# Patient Record
Sex: MALE | Race: WHITE | NOT HISPANIC OR LATINO | Employment: OTHER | URBAN - METROPOLITAN AREA
[De-identification: names, ages, dates, MRNs, and addresses within clinical notes are randomized per-mention and may not be internally consistent; named-entity substitution may affect disease eponyms.]

---

## 2022-02-11 ENCOUNTER — TRANSFERRED RECORDS (OUTPATIENT)
Dept: HEALTH INFORMATION MANAGEMENT | Facility: CLINIC | Age: 62
End: 2022-02-11

## 2022-03-16 ENCOUNTER — TRANSFERRED RECORDS (OUTPATIENT)
Dept: HEALTH INFORMATION MANAGEMENT | Facility: CLINIC | Age: 62
End: 2022-03-16

## 2022-04-25 ENCOUNTER — TRANSFERRED RECORDS (OUTPATIENT)
Dept: HEALTH INFORMATION MANAGEMENT | Facility: CLINIC | Age: 62
End: 2022-04-25

## 2022-05-09 ENCOUNTER — TRANSFERRED RECORDS (OUTPATIENT)
Dept: HEALTH INFORMATION MANAGEMENT | Facility: CLINIC | Age: 62
End: 2022-05-09

## 2022-05-09 LAB
EJECTION FRACTION: NORMAL %
INR (EXTERNAL): 1.2

## 2022-05-10 ENCOUNTER — TRANSFERRED RECORDS (OUTPATIENT)
Dept: HEALTH INFORMATION MANAGEMENT | Facility: CLINIC | Age: 62
End: 2022-05-10

## 2022-05-11 LAB
CREATININE (EXTERNAL): 1 MG/DL (ref 0.7–1.3)
GFR ESTIMATED (EXTERNAL): >=60 ML/MIN (ref 60–130)
GLUCOSE (EXTERNAL): 94 MG/DL (ref 70–99)
POTASSIUM (EXTERNAL): 3.9 MMOL/L (ref 3.5–5.1)

## 2022-05-16 ENCOUNTER — APPOINTMENT (OUTPATIENT)
Dept: CT IMAGING | Facility: CLINIC | Age: 62
DRG: 246 | End: 2022-05-16
Attending: PHYSICIAN ASSISTANT
Payer: COMMERCIAL

## 2022-05-16 ENCOUNTER — APPOINTMENT (OUTPATIENT)
Dept: GENERAL RADIOLOGY | Facility: CLINIC | Age: 62
DRG: 246 | End: 2022-05-16
Attending: PHYSICIAN ASSISTANT
Payer: COMMERCIAL

## 2022-05-16 ENCOUNTER — APPOINTMENT (OUTPATIENT)
Dept: ULTRASOUND IMAGING | Facility: CLINIC | Age: 62
DRG: 246 | End: 2022-05-16
Attending: PHYSICIAN ASSISTANT
Payer: COMMERCIAL

## 2022-05-16 ENCOUNTER — HOSPITAL ENCOUNTER (INPATIENT)
Facility: CLINIC | Age: 62
LOS: 10 days | Discharge: HOME | DRG: 246 | End: 2022-05-26
Attending: INTERNAL MEDICINE | Admitting: INTERNAL MEDICINE
Payer: COMMERCIAL

## 2022-05-16 DIAGNOSIS — D68.61 ANTIPHOSPHOLIPID ANTIBODY SYNDROME (H): ICD-10-CM

## 2022-05-16 DIAGNOSIS — F32.9 REACTIVE DEPRESSION: ICD-10-CM

## 2022-05-16 DIAGNOSIS — Z12.11 SPECIAL SCREENING FOR MALIGNANT NEOPLASMS, COLON: ICD-10-CM

## 2022-05-16 DIAGNOSIS — I50.9 DECOMPENSATED HEART FAILURE (H): Primary | ICD-10-CM

## 2022-05-16 DIAGNOSIS — I50.23 ACUTE ON CHRONIC SYSTOLIC HEART FAILURE (H): ICD-10-CM

## 2022-05-16 LAB
ABO/RH(D): NORMAL
ALBUMIN SERPL-MCNC: 3.5 G/DL (ref 3.4–5)
ALBUMIN SERPL-MCNC: 3.5 G/DL (ref 3.4–5)
ALBUMIN UR-MCNC: 20 MG/DL
ALP SERPL-CCNC: 90 U/L (ref 40–150)
ALP SERPL-CCNC: 90 U/L (ref 40–150)
ALT SERPL W P-5'-P-CCNC: 56 U/L (ref 0–70)
ALT SERPL W P-5'-P-CCNC: 56 U/L (ref 0–70)
ANION GAP SERPL CALCULATED.3IONS-SCNC: 9 MMOL/L (ref 3–14)
ANTIBODY SCREEN: NEGATIVE
APPEARANCE UR: CLEAR
APTT PPP: 53 SECONDS (ref 22–38)
AST SERPL W P-5'-P-CCNC: 44 U/L (ref 0–45)
AST SERPL W P-5'-P-CCNC: 44 U/L (ref 0–45)
BASOPHILS # BLD AUTO: 0 10E3/UL (ref 0–0.2)
BASOPHILS NFR BLD AUTO: 0 %
BILIRUB DIRECT SERPL-MCNC: 0.2 MG/DL (ref 0–0.2)
BILIRUB SERPL-MCNC: 0.4 MG/DL (ref 0.2–1.3)
BILIRUB SERPL-MCNC: 0.4 MG/DL (ref 0.2–1.3)
BILIRUB UR QL STRIP: NEGATIVE
BUN SERPL-MCNC: 26 MG/DL (ref 7–30)
CALCIUM SERPL-MCNC: 9.6 MG/DL (ref 8.5–10.1)
CHLORIDE BLD-SCNC: 103 MMOL/L (ref 94–109)
CHOLEST SERPL-MCNC: 98 MG/DL
CO2 SERPL-SCNC: 23 MMOL/L (ref 20–32)
COLOR UR AUTO: ABNORMAL
CREAT SERPL-MCNC: 0.96 MG/DL (ref 0.66–1.25)
EOSINOPHIL # BLD AUTO: 0 10E3/UL (ref 0–0.7)
EOSINOPHIL NFR BLD AUTO: 0 %
ERYTHROCYTE [DISTWIDTH] IN BLOOD BY AUTOMATED COUNT: 18.2 % (ref 10–15)
GFR SERPL CREATININE-BSD FRML MDRD: 90 ML/MIN/1.73M2
GLUCOSE BLD-MCNC: 131 MG/DL (ref 70–99)
GLUCOSE UR STRIP-MCNC: NEGATIVE MG/DL
HCT VFR BLD AUTO: 28.4 % (ref 40–53)
HDLC SERPL-MCNC: 34 MG/DL
HGB BLD-MCNC: 8.8 G/DL (ref 13.3–17.7)
HGB UR QL STRIP: NEGATIVE
IMM GRANULOCYTES # BLD: 0.1 10E3/UL
IMM GRANULOCYTES NFR BLD: 1 %
INR PPP: 1.23 (ref 0.85–1.15)
KETONES UR STRIP-MCNC: NEGATIVE MG/DL
LDLC SERPL CALC-MCNC: 45 MG/DL
LEUKOCYTE ESTERASE UR QL STRIP: NEGATIVE
LYMPHOCYTES # BLD AUTO: 0.9 10E3/UL (ref 0.8–5.3)
LYMPHOCYTES NFR BLD AUTO: 7 %
MAGNESIUM SERPL-MCNC: 2.2 MG/DL (ref 1.6–2.3)
MCH RBC QN AUTO: 28.6 PG (ref 26.5–33)
MCHC RBC AUTO-ENTMCNC: 31 G/DL (ref 31.5–36.5)
MCV RBC AUTO: 92 FL (ref 78–100)
MONOCYTES # BLD AUTO: 1 10E3/UL (ref 0–1.3)
MONOCYTES NFR BLD AUTO: 8 %
MUCOUS THREADS #/AREA URNS LPF: PRESENT /LPF
NEUTROPHILS # BLD AUTO: 10.7 10E3/UL (ref 1.6–8.3)
NEUTROPHILS NFR BLD AUTO: 84 %
NITRATE UR QL: NEGATIVE
NONHDLC SERPL-MCNC: 64 MG/DL
NRBC # BLD AUTO: 0 10E3/UL
NRBC BLD AUTO-RTO: 0 /100
NT-PROBNP SERPL-MCNC: 5955 PG/ML (ref 0–900)
PH UR STRIP: 5.5 [PH] (ref 5–7)
PLATELET # BLD AUTO: 344 10E3/UL (ref 150–450)
POTASSIUM BLD-SCNC: 4.7 MMOL/L (ref 3.4–5.3)
PROT SERPL-MCNC: 7.4 G/DL (ref 6.8–8.8)
PROT SERPL-MCNC: 7.4 G/DL (ref 6.8–8.8)
RBC # BLD AUTO: 3.08 10E6/UL (ref 4.4–5.9)
RBC URINE: 0 /HPF
SARS-COV-2 RNA RESP QL NAA+PROBE: NEGATIVE
SODIUM SERPL-SCNC: 135 MMOL/L (ref 133–144)
SP GR UR STRIP: 1.03 (ref 1–1.03)
TRIGL SERPL-MCNC: 96 MG/DL
TSH SERPL DL<=0.005 MIU/L-ACNC: 1.02 MU/L (ref 0.4–4)
UROBILINOGEN UR STRIP-MCNC: NORMAL MG/DL
WBC # BLD AUTO: 12.6 10E3/UL (ref 4–11)
WBC URINE: <1 /HPF

## 2022-05-16 PROCEDURE — 93880 EXTRACRANIAL BILAT STUDY: CPT

## 2022-05-16 PROCEDURE — 93970 EXTREMITY STUDY: CPT | Mod: 26 | Performed by: RADIOLOGY

## 2022-05-16 PROCEDURE — 85025 COMPLETE CBC W/AUTO DIFF WBC: CPT

## 2022-05-16 PROCEDURE — 86901 BLOOD TYPING SEROLOGIC RH(D): CPT | Performed by: PHYSICIAN ASSISTANT

## 2022-05-16 PROCEDURE — 36415 COLL VENOUS BLD VENIPUNCTURE: CPT

## 2022-05-16 PROCEDURE — 82248 BILIRUBIN DIRECT: CPT

## 2022-05-16 PROCEDURE — 71046 X-RAY EXAM CHEST 2 VIEWS: CPT

## 2022-05-16 PROCEDURE — 85610 PROTHROMBIN TIME: CPT

## 2022-05-16 PROCEDURE — 83735 ASSAY OF MAGNESIUM: CPT

## 2022-05-16 PROCEDURE — 83718 ASSAY OF LIPOPROTEIN: CPT

## 2022-05-16 PROCEDURE — 250N000012 HC RX MED GY IP 250 OP 636 PS 637

## 2022-05-16 PROCEDURE — 93970 EXTREMITY STUDY: CPT

## 2022-05-16 PROCEDURE — 93880 EXTRACRANIAL BILAT STUDY: CPT | Mod: 26 | Performed by: RADIOLOGY

## 2022-05-16 PROCEDURE — 84450 TRANSFERASE (AST) (SGOT): CPT

## 2022-05-16 PROCEDURE — 93005 ELECTROCARDIOGRAM TRACING: CPT

## 2022-05-16 PROCEDURE — 250N000011 HC RX IP 250 OP 636

## 2022-05-16 PROCEDURE — 214N000001 HC R&B CCU UMMC

## 2022-05-16 PROCEDURE — 81001 URINALYSIS AUTO W/SCOPE: CPT | Performed by: PHYSICIAN ASSISTANT

## 2022-05-16 PROCEDURE — 80321 ALCOHOLS BIOMARKERS 1OR 2: CPT

## 2022-05-16 PROCEDURE — 93010 ELECTROCARDIOGRAM REPORT: CPT | Performed by: INTERNAL MEDICINE

## 2022-05-16 PROCEDURE — 71250 CT THORAX DX C-: CPT | Mod: 26 | Performed by: RADIOLOGY

## 2022-05-16 PROCEDURE — 85730 THROMBOPLASTIN TIME PARTIAL: CPT | Performed by: PHYSICIAN ASSISTANT

## 2022-05-16 PROCEDURE — 71046 X-RAY EXAM CHEST 2 VIEWS: CPT | Mod: 26 | Performed by: RADIOLOGY

## 2022-05-16 PROCEDURE — U0005 INFEC AGEN DETEC AMPLI PROBE: HCPCS

## 2022-05-16 PROCEDURE — 83880 ASSAY OF NATRIURETIC PEPTIDE: CPT

## 2022-05-16 PROCEDURE — 99223 1ST HOSP IP/OBS HIGH 75: CPT | Mod: AI | Performed by: INTERNAL MEDICINE

## 2022-05-16 PROCEDURE — 84443 ASSAY THYROID STIM HORMONE: CPT

## 2022-05-16 PROCEDURE — 84156 ASSAY OF PROTEIN URINE: CPT | Performed by: STUDENT IN AN ORGANIZED HEALTH CARE EDUCATION/TRAINING PROGRAM

## 2022-05-16 PROCEDURE — 36415 COLL VENOUS BLD VENIPUNCTURE: CPT | Performed by: PHYSICIAN ASSISTANT

## 2022-05-16 PROCEDURE — 99222 1ST HOSP IP/OBS MODERATE 55: CPT | Performed by: PHYSICIAN ASSISTANT

## 2022-05-16 PROCEDURE — 86850 RBC ANTIBODY SCREEN: CPT | Performed by: PHYSICIAN ASSISTANT

## 2022-05-16 PROCEDURE — 84134 ASSAY OF PREALBUMIN: CPT

## 2022-05-16 PROCEDURE — 80323 ALKALOIDS NOS: CPT

## 2022-05-16 PROCEDURE — 250N000013 HC RX MED GY IP 250 OP 250 PS 637

## 2022-05-16 PROCEDURE — 71250 CT THORAX DX C-: CPT

## 2022-05-16 RX ORDER — HYDROXYCHLOROQUINE SULFATE 200 MG/1
200 TABLET, FILM COATED ORAL 2 TIMES DAILY
COMMUNITY
End: 2022-09-15

## 2022-05-16 RX ORDER — MYCOPHENOLATE MOFETIL 500 MG/1
1500 TABLET ORAL 2 TIMES DAILY
Status: ON HOLD | COMMUNITY
End: 2022-09-01

## 2022-05-16 RX ORDER — ZOLPIDEM TARTRATE 5 MG/1
5 TABLET ORAL
COMMUNITY
End: 2022-10-07

## 2022-05-16 RX ORDER — LORAZEPAM 0.5 MG/1
0.5 TABLET ORAL 2 TIMES DAILY PRN
Status: DISCONTINUED | OUTPATIENT
Start: 2022-05-16 | End: 2022-05-16

## 2022-05-16 RX ORDER — MYCOPHENOLATE MOFETIL 250 MG/1
1500 CAPSULE ORAL 2 TIMES DAILY
Status: DISCONTINUED | OUTPATIENT
Start: 2022-05-16 | End: 2022-05-17

## 2022-05-16 RX ORDER — POTASSIUM CHLORIDE 1500 MG/1
20 TABLET, EXTENDED RELEASE ORAL DAILY
Status: ON HOLD | COMMUNITY
End: 2022-05-25

## 2022-05-16 RX ORDER — HYDROXYZINE HYDROCHLORIDE 50 MG/1
50 TABLET, FILM COATED ORAL EVERY 6 HOURS PRN
Status: DISCONTINUED | OUTPATIENT
Start: 2022-05-16 | End: 2022-05-26 | Stop reason: HOSPADM

## 2022-05-16 RX ORDER — TAMSULOSIN HYDROCHLORIDE 0.4 MG/1
0.4 CAPSULE ORAL DAILY
COMMUNITY
End: 2022-09-04

## 2022-05-16 RX ORDER — LANOLIN ALCOHOL/MO/W.PET/CERES
3-6 CREAM (GRAM) TOPICAL
Status: DISCONTINUED | OUTPATIENT
Start: 2022-05-16 | End: 2022-05-17

## 2022-05-16 RX ORDER — TAMSULOSIN HYDROCHLORIDE 0.4 MG/1
0.4 CAPSULE ORAL DAILY
Status: DISCONTINUED | OUTPATIENT
Start: 2022-05-17 | End: 2022-05-26 | Stop reason: HOSPADM

## 2022-05-16 RX ORDER — ASPIRIN 81 MG/1
81 TABLET, CHEWABLE ORAL DAILY
Status: ON HOLD | COMMUNITY
End: 2022-05-25

## 2022-05-16 RX ORDER — FUROSEMIDE 40 MG
40 TABLET ORAL
Status: DISCONTINUED | OUTPATIENT
Start: 2022-05-16 | End: 2022-05-17

## 2022-05-16 RX ORDER — FUROSEMIDE 40 MG
40 TABLET ORAL 2 TIMES DAILY
Status: ON HOLD | COMMUNITY
End: 2022-05-30

## 2022-05-16 RX ORDER — LANOLIN ALCOHOL/MO/W.PET/CERES
6 CREAM (GRAM) TOPICAL
Status: ON HOLD | COMMUNITY
End: 2022-09-14

## 2022-05-16 RX ORDER — ENOXAPARIN SODIUM 100 MG/ML
1 INJECTION SUBCUTANEOUS EVERY 12 HOURS
Status: DISCONTINUED | OUTPATIENT
Start: 2022-05-16 | End: 2022-05-26 | Stop reason: HOSPADM

## 2022-05-16 RX ORDER — ACETAMINOPHEN 325 MG/1
650 TABLET ORAL EVERY 4 HOURS PRN
Status: DISCONTINUED | OUTPATIENT
Start: 2022-05-16 | End: 2022-05-26 | Stop reason: HOSPADM

## 2022-05-16 RX ORDER — ENOXAPARIN SODIUM 100 MG/ML
70 INJECTION SUBCUTANEOUS EVERY 12 HOURS
Status: ON HOLD | COMMUNITY
End: 2022-05-26

## 2022-05-16 RX ORDER — FERROUS SULFATE 325(65) MG
325 TABLET ORAL
Status: DISCONTINUED | OUTPATIENT
Start: 2022-05-17 | End: 2022-05-26 | Stop reason: HOSPADM

## 2022-05-16 RX ORDER — ASPIRIN 81 MG/1
81 TABLET ORAL DAILY
Status: DISCONTINUED | OUTPATIENT
Start: 2022-05-17 | End: 2022-05-24

## 2022-05-16 RX ORDER — LORAZEPAM 0.5 MG/1
0.5 TABLET ORAL DAILY PRN
Status: DISCONTINUED | OUTPATIENT
Start: 2022-05-16 | End: 2022-05-17

## 2022-05-16 RX ORDER — FAMOTIDINE 20 MG/1
20 TABLET, FILM COATED ORAL 2 TIMES DAILY
Status: DISCONTINUED | OUTPATIENT
Start: 2022-05-16 | End: 2022-05-26 | Stop reason: HOSPADM

## 2022-05-16 RX ORDER — HYDROXYCHLOROQUINE SULFATE 200 MG/1
200 TABLET, FILM COATED ORAL 2 TIMES DAILY
Status: DISCONTINUED | OUTPATIENT
Start: 2022-05-16 | End: 2022-05-26 | Stop reason: HOSPADM

## 2022-05-16 RX ORDER — ATORVASTATIN CALCIUM 40 MG/1
40 TABLET, FILM COATED ORAL DAILY
Status: DISCONTINUED | OUTPATIENT
Start: 2022-05-17 | End: 2022-05-26 | Stop reason: HOSPADM

## 2022-05-16 RX ORDER — HYDROXYZINE HYDROCHLORIDE 25 MG/1
25 TABLET, FILM COATED ORAL EVERY 6 HOURS PRN
Status: DISCONTINUED | OUTPATIENT
Start: 2022-05-16 | End: 2022-05-26 | Stop reason: HOSPADM

## 2022-05-16 RX ORDER — LORAZEPAM 0.5 MG/1
.5-1 TABLET ORAL EVERY 4 HOURS PRN
Status: ON HOLD | COMMUNITY
End: 2022-09-01

## 2022-05-16 RX ORDER — FAMOTIDINE 20 MG/1
20 TABLET, FILM COATED ORAL 2 TIMES DAILY
Status: ON HOLD | COMMUNITY
End: 2022-08-21

## 2022-05-16 RX ORDER — ATORVASTATIN CALCIUM 40 MG/1
40 TABLET, FILM COATED ORAL DAILY
Status: ON HOLD | COMMUNITY
End: 2022-09-01

## 2022-05-16 RX ORDER — FERROUS SULFATE 325(65) MG
325 TABLET, DELAYED RELEASE (ENTERIC COATED) ORAL
COMMUNITY
Start: 2022-09-04 | End: 2022-09-04

## 2022-05-16 RX ADMIN — ACETAMINOPHEN 650 MG: 325 TABLET ORAL at 18:25

## 2022-05-16 RX ADMIN — Medication 3 MG: at 23:05

## 2022-05-16 RX ADMIN — FAMOTIDINE 20 MG: 20 TABLET ORAL at 20:00

## 2022-05-16 RX ADMIN — HYDROXYCHLOROQUINE SULFATE 200 MG: 200 TABLET, FILM COATED ORAL at 20:00

## 2022-05-16 RX ADMIN — MYCOPHENOLATE MOFETIL 1500 MG: 250 CAPSULE ORAL at 20:00

## 2022-05-16 RX ADMIN — Medication 12.5 MG: at 20:00

## 2022-05-16 RX ADMIN — ENOXAPARIN SODIUM 70 MG: 80 INJECTION SUBCUTANEOUS at 20:01

## 2022-05-16 RX ADMIN — HYDROXYZINE HYDROCHLORIDE 25 MG: 25 TABLET, FILM COATED ORAL at 18:24

## 2022-05-16 RX ADMIN — LORAZEPAM 0.5 MG: 0.5 TABLET ORAL at 23:05

## 2022-05-16 ASSESSMENT — ACTIVITIES OF DAILY LIVING (ADL)
WEAR_GLASSES_OR_BLIND: NO
DIFFICULTY_EATING/SWALLOWING: NO
ADLS_ACUITY_SCORE: 17
DRESS: 0-->INDEPENDENT
ADLS_ACUITY_SCORE: 31
DRESSING/BATHING_DIFFICULTY: NO
ADLS_ACUITY_SCORE: 17
ADLS_ACUITY_SCORE: 17
DOING_ERRANDS_INDEPENDENTLY_DIFFICULTY: NO
BATHING: 0-->INDEPENDENT
WALKING_OR_CLIMBING_STAIRS_DIFFICULTY: NO
TOILETING_ISSUES: NO
ADLS_ACUITY_SCORE: 17
DRESS: 0-->ASSISTANCE NEEDED (DEVELOPMENTALLY APPROPRIATE)
ADLS_ACUITY_SCORE: 17
CONCENTRATING,_REMEMBERING_OR_MAKING_DECISIONS_DIFFICULTY: NO
FALL_HISTORY_WITHIN_LAST_SIX_MONTHS: NO
CHANGE_IN_FUNCTIONAL_STATUS_SINCE_ONSET_OF_CURRENT_ILLNESS/INJURY: YES

## 2022-05-16 NOTE — Clinical Note
Single balloon inflation. The first balloon was inserted into the left anterior descending and proximal left anterior descending.Max pressure = 6 bijan. Total duration = 12 seconds.     Shockwave.

## 2022-05-16 NOTE — PROGRESS NOTES
"ADMIT    Patient arrived with EMS via stretcher about 1205. On room air, appears comfortable. Able to walk from stretcher to bed. No complaints of pain, current SOB, dizziness, or anything else.     Chief complaint is increased shortness of breath with prolonged activity.     /71 (BP Location: Right arm)   Pulse 99   Ht 1.715 m (5' 7.5\")   Wt 66.8 kg (147 lb 4.3 oz)   SpO2 98%   BMI 22.72 kg/m      Admission/Transfer from: [place]  2 RN skin assessment completed. Yes  Significant findings include: blanchable redness to coccyx  [facility] Nurse Consult Ordered? No      "

## 2022-05-16 NOTE — Clinical Note
The first balloon was inserted into the left anterior descending and ostium left anterior descending.Max pressure = 6 bijan. Total duration = 30 seconds.     Shockwave .

## 2022-05-16 NOTE — CONSULTS
CARDIOTHORACIC SURGERY CONSULT NOTE  5/16/2022      Reason for Consult: CABG/MVR      ASSESSMENT/PLAN: Patient is a 61 year old male with a history of CAD s/p remote PCI to LAD in 2017, ICM LVEF 30% s/p CRT-D, severe MR, APL with hx of DVT/PE on chronic anticoagulation with coumadin, hx of COVID-19, HTN, and HLD who presented to [facility] as a transfer from [facility] in [place] for evaluation of multivessel coronary artery disease and moderate-severe MR. LHC at outside hospital showed severe ostial LAD disease with in-stent restenosis of mid LAD at diag bifurcation, severe proximal RCA disease, mild circ disease. Echo on 5/9 showed LVEF 30-35% with moderate to severe functional MR. Patient has since been diuresed and appears euvolemic. CVTS was consulted for consideration of CABG/possible CABG.    STS Risk Calculator (CABG alone):   Risk of Mortality: 1.483%  Renal Failure: 1.560%  Permanent Stroke: 0.738%  Prolonged Ventilation: 11.972%  DSW Infection: 0.230%  Reoperation: 3.051%  Morbidity or Mortality: 16.484%  Short Length of Stay: 40.912%  Long Length of Stay: 4.922%    STS Risk Calculator (MVR/CABG):  Risk of Mortality: 5.392%  Renal Failure: 4.906%  Permanent Stroke: 1.739%  Prolonged Ventilation: 36.221%  DSW Infection: 0.409%  Reoperation: 7.609%  Morbidity or Mortality: 43.622%  Short Length of Stay: 9.572%  Long Length of Stay: 19.310%      - Recommend repeat echocardiogram to reassess LVEF and MR now that he is euvolemic.   - Will need pre-operative cardiac MRI to assess viability  - Will obtain vein mapping, bilateral carotid duplex US, CT chest w/o contrast  - Has a history of lupus, on immunosuppression with mycophenolate 1500 mg BID, please consult rheumatology to assess whether he can hold or reduce doses of immunosuppression.   - Consult dentistry for pre-op clearance prior to possible valve replacement  - Other pre-op labs ordered  - Plan for CABG, possible MVR this admission, pending  the above work-up  - Other cares per primary team  - Thank you for the opportunity to participate in the care of this patient.    Patient and plan discussed with attending, Dr. Darius Zamora.      Grant Gomez PA-C  Cardiothoracic Surgery  May 16, 2022 3:02 PM   p: [phone]       ________________________________________________________________________________________________    HPI: Patient is a 61 year old male with a history of  CAD s/p remote PCI to LAD in 2017, ICM LVEF 30% s/p CRT-D, severe MR, APL with hx of DVT/PE on chronic anticoagulation with coumadin, hx of COVID-19, HTN, and HLD who presented to [facility] as a transfer from [facility] in [place] for evaluation of multivessel coronary artery disease and moderate-severe MR.     Patient has a history of STEMI in 2017 and underwent LAD stenting at that time. He had ischemic cardiomyopathy and had a CRT-D placed for LBBB and persistently low LVEF.     He was then recently admitted for respiratory distress and COVID-19 infection versus decompensated heart failure with progression of his MR. His medical therapy was discontinued due to hypotension. Once recovered, he was discharged for mitraclip evaluation.     He was then readmitted to [facility] in [place] for SOB and heart failure exacerbation. He underwent LHC at outside hospital showed severe ostial LAD disease with in-stent restenosis of mid LAD at diag bifurcation, severe proximal RCA disease, mild circ disease. Echo on 5/9 showed LVEF 30-35% with moderate to severe functional MR. Patient has since been diuresed and transferred to [facility] for consideration of CABG/possible MVR with ECMO/VAD back-up.    Patient reports his SOB is much improved after his diuresis. He does still have some CASEY. He denies any chest pain, lightheadedness, dizziness, palpitations, LE edema.     PMH:  No past medical history on file.    PSH:  No past surgical history on file.    FH:  No family history  on file.    SH:  Social History     Socioeconomic History     Marital status: Single       Home Meds:  Medications Prior to Admission   Medication Sig Dispense Refill Last Dose     aspirin (ASA) 81 MG chewable tablet Take 81 mg by mouth daily        Allergies:  No Known Allergies  ROS:    ROS: 10 point ROS neg other than the symptoms noted above in the HPI.    Physical Exam:  Temp:  [98.7  F (37.1  C)] 98.7  F (37.1  C)  Pulse:  [99] 99  BP: (100)/(71) 100/71  SpO2:  [98 %] 98 %  Gen: NAD, resting comfortably in bed, conversational  HEENT: normocephalic, atraumatic cranium, EOMI, sclerae anicteric. Oral mucosa pink and moist, no tonsillar edema or erythema, midline trachea, nonpalpable thyroid  Lungs: CTA in all fields, no wheezing or rhonchi  CV: RRR, S1S2 normal, +Systolic murmur. Radial pulses and DP pulses symmetric. No dependent edema.   Abd: Positive normal pitched bowel sounds, overall soft and non distended, nontender, no hepatosplenomegaly, no masses/guarding/rebound tenderness.     Labs:  ABG No lab results found in last 7 days.  CBC  Recent Labs   Lab 05/16/22  1442   WBC 12.6*   HGB 8.8*        BMPNo lab results found in last 7 days.  LFTNo lab results found in last 7 days.  PancreasNo lab results found in last 7 days.    Imaging:  No results found for this or any previous visit (from the past 24 hour(s)).

## 2022-05-16 NOTE — Clinical Note
Single balloon inflation. The first balloon was inserted into the left anterior descending and proximal left anterior descending.Max pressure = 12 bijan. Total duration = 2 seconds.

## 2022-05-16 NOTE — H&P
[facility]    Cardiology History and Physical - Cardiology 2         Date of Admission:  5/16/2022    Assessment & Plan: HVSL    Dandy Sands is a 61 year old male with history of CAD s/p remote PCI to LAD, ICM LVEF 30% s/p CRT-D, severe MR, APL with hx of DVT/PE on chronic anticoagulation with warfarin, hx of COVID-19 1/2022 (was hospitalized, not intubated), HTN, and HLD who was admitted to [facility] 5/16/2022 as a transfer from [facility] in [place] for evaluation of multivessel coronary artery disease and moderate-severe functional mitral regurgitation.    #ICM (5/9/2022 LVEF 30-35%)  #CAD s/p PCI to LAD (2005)  #Severe ostial LAD disease with in-stent restenosis of mid LAD at diag bifurcation, severe proximal RCA disease, mild circ disease  #Hx of MI (2007  #S/p CRT-D (Medtronic 2006, gen change 2012)  #Moderate to severe functional MR  Transferred from [facility] in [place] 5/16/2022 for evaluation of CABG + MVR at a center with ECMO/VAD as a back up. CVTS involved   - CVTS consulted, appreciate recs   - TTE to reassess LVEF and MR now that he is euvolemic   - Cardiac MRI to assess viability   - Vein mapping   - Bilateral carotid duplex US   - CT chest w/o contrast   - Rheumatology consult to assess whether he can hold or reduce doses of immunosuppression   - Dental consult for pre-op clearance prior to possible valve replacement   - Plan for CABG, possible MVR this admission pending workup  - ACEI/ARB/ARNI- no  - BB- continue pta metoprolol succinate 12.5mg qhs  - Aldosterone receptor antagonist- no  - SGLT2i- no  - Antiplatelet- continue pta aspirin 81mg daily, holding pta Plavix 75 mg daily  - Statin- continue pta atorvastatin 40mg daily  - Diuretic- continue pta furosemide 40mg bid  - OT consult  - Nutrition consult  - Strict I&Os  - Daily weights  - Telemetry    #SLE  #APL  #Hx of DVT and PE  PTA on warfarin (he took 6.5mg daily).  Transitioned to Lovenox at OSH in anticipation of surgery. He reports that his main symptom of lupus is diffuse joint pains that is tolerable with Tylenol.   - Rheumatology consulted, as above   - Lovenox 70mg q12h  - continue pta hydroxychloroquine 200mg bid   - continue pta mycophenolic acid 1500mg q12h   - Tylenol prn for pain    #Anxiety  Reports feeling anxious.   - continue pta lorazepam 0.5mg prn  - hydroxyzine prn for anxiety  - melatonin prn qhs  _________________  Chronic/stable:    #Anemia, chronic  - continue pta ferrous sulfate 325mg daily     #GERD  - continue pta famotidine 20mg bid    #BPH  - continue pta tamsulosin 0.4mg daily    #Hx of COVID-19  Had COVID ~1/2022, was hospitalized for ~6 days, was on BiPap for a little, was not intubated.       Diet: 2g Na diet, 2L fluid restriction   DVT Prophylaxis: Enoxaparin (Lovenox) SQ  Fitzgerald Catheter: Not present  Code Status: full code  Fluids: n/a  Lines: PIV       Disposition Plan   Expected discharge: 4 - 7 days, recommended to prior living arrangement once evaluation for surgical candidacy for CABG/MVR.    Entered: Austin Can MD 05/16/2022, 6:19 PM     Will be formally staffed in the AM.    Austin Can MD  [facility]    ______________________________________________________________________    Chief Complaint   Multivessel CAD and mitral regurgitation    History is obtained from the patient    History of Present Illness   Dandy Sands is a 61 year old male with history of CAD s/p remote PCI to LAD, ICM LVEF 30% s/p CRT-D, severe MR, APL with hx of DVT/PE on chronic anticoagulation with warfarin, hx of COVID-19 1/2022 (was hospitalized, not intubated), HTN, and HLD who presented to [facility] as a transfer from [facility] in [place] for evaluation of multivessel coronary artery disease and moderate-severe functional mitral regurgitation.    Regarding his cardiac history, underwent LAD  stenting first in 2005, STEMI in 2007.  This was complicated by ischemic cardiomyopathy and eventually had a CRT-D (Medtronic 2006, gen change 2012) placed for a left bundle branch block and persistently low LVEF despite medical therapy. He has had various degrees of mitral regurgitation which has been managed medically for quite some time. Recently he was admitted after having COVID-19 for respiratory distress. It was unclear whether this initially was  related to pneumonia or decompensated heart failure. After a prolonged hospitalization and extensive testing it was felt that this was related to decompensated heart failure with progression of his MR. Due to persistent hypotension he was taken off most of his GDMT. He was seen HF who felt that the MR was the primary  of his decompensation.  He was eventually discharged with referral for mitraclip eval.      Per report he had evaluation on 5/9/22.  LHC showed severe ostial LAD disease with in-stent restenosis of the mid LAD involving a diagonal bifurcation, mild LCx disease, and severe proximal RCA disease. LVEDP was 25.  TAVARES with anesthesia showed LVEF 30-35%, severely dilated LA, moderate to severe functional MR with multiple jets and apically tethered leaflets (he was hypotensive during the procedure SBP 70s). Due to his age, low LVEF, and multivessel CAD he was admitted that day for surgical evaluation.  CTS at [place] felt he would benefit from CABG + MVR but that he should be at a center with ECMO/VAD back up.      Otherwise at the OSH, he was diursed about 3kg and transitioned to to oral lasix.  He was taken off warfarin and started on Lovenox as a bridge, and also Plavix was held in anticipation of surgery.  Hospital course was otherwise complicated by epistaxis requiring cautery by ENT, and a headache ([facility] 5/14 negative per report, neuro felt to be migraines).    Meds at OSH  Aspirin 81mg daily  lipitor 40  lovenox 80mg q12h  pepcid 20mg bid  Iron ec  "325mg daily   Lasix 40mg bid  Plaquenil 200mg bid  toprol 12.5mg at bedtime  cellcept 1500mg q12h  [id number] every day  flomax 0.4mg daily  Tylenol [id number]    Talking with him today, he says that he was hospitalized in Jan 2022 for ~6 days with COVID, was on BiPAP, was not intubated. He has been hospitalized multiple times since then. Mitral regurgitation and multivessel CAD found eventually as noted above. He reports no fevers, chills, headaches, chest pain, difficulty breathing, abdominal pain, nausea, vomiting, dysuria. Has 1 loose stool per day. Has joint pains diffusely (main symptom of his lupus). Tylenol helps with his joint pains. No history of liver disease or stroke. No melena or hematochezia. In January he weighed about 185 lbs. 147 lbs today.    Social hx:   - Tobacco- He is a former smoker 2 ppd for 40 years quit on 09/09/09  - EtOH- quit 5 years ago  - Illicit drug use- no  - Medication compliance- takes his meds \"98% of the time\"  - Job- works as a  in [place], [place]  - Living- lives alone with his cat (Rubens) in [place], [place]. Lives in a house.  - Functional status- he does not have problems walking up a staircase. Is able to take care of himself.  - Daughter Asha Sands ([phone])- surrogate decision maker, Living Will Durable POA for Healthcare  - Son Amos Sands ([phone])  - full code. reports that he would not want to live in a nursing home    Review of Systems    The 10 point Review of Systems is negative other than noted in the HPI or here.    Past Medical History    I have reviewed this patient's medical history and updated it with pertinent information if needed.   No past medical history on file.    Past Surgical History   I have reviewed this patient's surgical history and updated it with pertinent information if needed.  - Had surgery for a hernia years ago.    Social History   I have reviewed this patient's social history and updated it with pertinent " information if needed.     Family History   I have reviewed this patient's family history and updated it with pertinent information if needed.   - Family history- mom and dad both had lung cancer (both smokers)    Prior to Admission Medications   Prior to Admission Medications   Prescriptions Last Dose Informant Patient Reported? Taking?   LORazepam (ATIVAN) 0.5 MG tablet 5/15/2022 at 2300  Yes Yes   Sig: Take 0.5-1 mg by mouth every 4 hours as needed for anxiety   aspirin (ASA) 81 MG chewable tablet 5/16/2022 at 0700  Yes Yes   Sig: Take 81 mg by mouth daily   ferrous sulfate (FE TABS) 325 (65 Fe) MG EC tablet 5/15/2022 at 1400  Yes Yes   Sig: Take 325 mg by mouth daily (with lunch)   mycophenolate (GENERIC EQUIVALENT) 500 MG tablet 5/16/2022 at 0700  Yes Yes   Sig: Take 1,500 mg by mouth 2 times daily   zolpidem (AMBIEN) 5 MG tablet 5/15/2022 at mn  Yes Yes   Sig: Take 5 mg by mouth nightly as needed for sleep      Facility-Administered Medications: None     Allergies   No Known Allergies    Physical Exam   Vital Signs: Temp: (!) 96.2  F (35.7  C) Temp src: Axillary BP: 93/71 Pulse: 101   Resp: 14 SpO2: 98 %      Weight: 147 lbs 4.28 oz  General: sitting up in bed, NAD, appears comfortable  HEENT: no scleral icterus or conjunctival injection, oropharynx clear  Resp: clear to auscultation bilaterally, no crackles or wheezes  Cardiac: regular rate and rhythm, systolic murmur at apex. JVD to mid neck when lying at 30 degrees.  Abdomen: soft, non-tender, non-distended. No rebound or guarding.  Extremities: warm, no lower extremity edema  MSK: some reduced muscle bulk  Neuro: alert, oriented x4. No focal neuro deficits.  Psych: appropriate mood and affect    Data   Data reviewed today: I reviewed all medications, new labs and imaging results over the last 24 hours. I personally reviewed Cardiology specific data review: the EKG tracing showing sinus rhythm, left axis deviation

## 2022-05-16 NOTE — Clinical Note
The first balloon was inserted into the left anterior descending and proximal left anterior descending.Max pressure = 14 bijan. Total duration = 4 seconds.     Max pressure = 14 bijan. Total duration = 5 seconds.   3.5 NC .  Balloon reinflated a second time: Max pressure = 14 bijan. Total duration = 5 seconds.

## 2022-05-16 NOTE — Clinical Note
Stent deployed in the proximal right coronary artery. Max pressure = 12 bijan. Total duration = 3 seconds.

## 2022-05-16 NOTE — Clinical Note
Single balloon inflation. The first balloon was inserted into the right coronary artery and proximal right coronary artery.Max pressure = 12 bijan. Total duration = 5 seconds.     Max pressure = 14 bijan. Total duration = 5 seconds.    Balloon reinflated a second time: Max pressure = 14 bijan. Total duration = 5 seconds.

## 2022-05-16 NOTE — PROGRESS NOTES
Admit: 5/16/2022 12:05 PM Decompensated heart failure     Neuro: AAOx4. Denies any pain    Vitals: Temp: (!) 96.2  F (35.7  C) Temp src: Axillary BP: 93/71 Pulse: 101   Resp: 14 SpO2: 98 %    Sinus tachycardia    GI/: 2G NA and low fat diet with 2L fluid restriction. Last bm today per patient. Voiding per urinal.     Running: SL    Mobility: Independent in room.     Changes: CVTS Consulted. CT, EKG, chest xray, UA, echo done. covid test negative.    Plan: Continue workup for possible CABG and valve replacement.

## 2022-05-16 NOTE — Clinical Note
Single balloon inflation. The first balloon was inserted into the left anterior descending and proximal left anterior descending.Max pressure = 6 bijan. Total duration = 30 seconds.

## 2022-05-16 NOTE — Clinical Note
The first balloon was inserted into the left anterior descending and proximal left anterior descending.Max pressure = 10 bijan. Total duration = 4 seconds.     Max pressure = 10 bijan. Total duration = 3 seconds.    Balloon reinflated a second time: Max pressure = 10 bijan. Total duration = 3 seconds.  Balloon reinflated a third time: Max pressure = 12 bijan. Total duration = 2 seconds.

## 2022-05-16 NOTE — Clinical Note
The first balloon was inserted into the left anterior descending and proximal left anterior descending.Max pressure = 12 bijan. Total duration = 2 seconds.     Max pressure = 12 bijan. Total duration = 2 seconds.    Balloon reinflated a second time: Max pressure = 12 bijan. Total duration = 2 seconds.

## 2022-05-16 NOTE — PHARMACY-ADMISSION MEDICATION HISTORY
Admission Medication History Completed by Pharmacy    See [facility] Admission Navigator for allergy information, preferred outpatient pharmacy, prior to admission medications and immunization status.     Medication History Sources:     MAR from previous hospital ([facility], [place]; pt admitted there 5/9/22 and transferred to [facility] 5/16/22)    Changes made to PTA medication list (reason):    Added:   o Zolpidem  o Lorazepam  o Mycophenolate  o Ferrous sulfate  o Tamsulosin  o Potassium chloride  o Furosemide  o Atorvastatin  o Enoxaparin  o Melatonin  o Famotidine  o Hydroxychloroquine  o Metoprolol succinate    Deleted: None    Changed: None    Additional Information:    Pt not involved in this med rec interview. This med list reflects what pt was receiving during inpatient stay prior to transfer to [facility]; this does not reflect pt's med regimen prior to presenting to OSH on 5/9/22.    Transfer documentation indicates pt was previously on warfarin and clopidogrel that were both stopped (warfarin replaced by enoxaparin). Unfortunately the MAR from OSH did not have historical info dating back to when these meds were stopped/last given, as it only stated the last dose of each medication.    OSH records indicated NKDA.    Prior to Admission medications    Medication Sig Last Dose Taking? Auth Provider   aspirin (ASA) 81 MG chewable tablet Take 81 mg by mouth daily 5/16/2022 at 0700 Yes Reported, Patient   atorvastatin (LIPITOR) 40 MG tablet Take 40 mg by mouth daily 5/16/2022 at 0700 Yes Unknown, Entered By History   enoxaparin ANTICOAGULANT (LOVENOX) 80 MG/0.8ML syringe Inject 70 mg Subcutaneous every 12 hours 5/16/2022 at 0700 Yes Unknown, Entered By History   famotidine (PEPCID) 20 MG tablet Take 20 mg by mouth 2 times daily 5/16/2022 at 0700 Yes Unknown, Entered By History   ferrous sulfate (FE TABS) 325 (65 Fe) MG EC tablet Take 325 mg by mouth daily (with lunch) 5/15/2022 at 1400 Yes  Unknown, Entered By History   furosemide (LASIX) 40 MG tablet Take 40 mg by mouth 2 times daily 5/15/2022 at 1400 Yes Unknown, Entered By History   hydroxychloroquine (PLAQUENIL) 200 MG tablet Take 200 mg by mouth 2 times daily 5/16/2022 at 0700 Yes Unknown, Entered By History   LORazepam (ATIVAN) 0.5 MG tablet Take 0.5-1 mg by mouth every 4 hours as needed for anxiety 5/15/2022 at 2300 Yes Unknown, Entered By History   melatonin 3 MG tablet Take 6 mg by mouth nightly as needed for sleep 5/15/2022 at 2200 Yes Unknown, Entered By History   METOPROLOL SUCCINATE ER PO Take 12.5 mg by mouth At Bedtime 5/15/2022 at 2000 Yes Unknown, Entered By History   mycophenolate (GENERIC EQUIVALENT) 500 MG tablet Take 1,500 mg by mouth 2 times daily 5/16/2022 at 0700 Yes Unknown, Entered By History   potassium chloride ER (K-TAB) 20 MEQ CR tablet Take 20 mEq by mouth daily 5/16/2022 at 0700 Yes Unknown, Entered By History   tamsulosin (FLOMAX) 0.4 MG capsule Take 0.4 mg by mouth daily 5/16/2022 at 0700 Yes Unknown, Entered By History   zolpidem (AMBIEN) 5 MG tablet Take 5 mg by mouth nightly as needed for sleep 5/15/2022 at mn Yes Unknown, Entered By History        Date completed: 05/16/22    Medication history completed by:  Nigel Stern, PharmD, BCPS

## 2022-05-16 NOTE — Clinical Note
The first balloon was inserted into the right coronary artery and proximal right coronary artery.Max pressure = 6 bijan. Total duration = 30 seconds.     Shockwave .

## 2022-05-16 NOTE — Clinical Note
Single balloon inflation. The first balloon was inserted into the left anterior descending and proximal left anterior descending.Max pressure = 12 bijan. Total duration = 4 seconds.

## 2022-05-16 NOTE — PROGRESS NOTES
"SPIRITUAL HEALTH SERVICES  [facility] 6C  ON-CALL VISIT     REFERRAL SOURCE: With admission     Pt was cheerful, stated \"I'm fine, they haven't done anything yet. Today, I want a prayer before they do something. And tomorrow, I'll want a prayer after my surgery that I'll be ok.\" Pt stated his daughter would be coming to visit him tonight and then tomorrow his son and wife would be visiting    [facility] spiritual health services and provided prayer.     PLAN: Unit  follow-up.     Rev. Genevieve Zamora MDiv, [facility]  Staff    Pager [phone]  * [facility] remains available 24/7 for emergent requests/referrals, either by having the switchboard page the on-call  or by entering an ASAP/STAT consult in Epic (this will also page the on-call ).*      "

## 2022-05-16 NOTE — Clinical Note
Ultrasound catheter inserted for high resolution imaging into right coronary artery and proximal right coronary artery.

## 2022-05-16 NOTE — Clinical Note
The first balloon was inserted into the left anterior descending and ostium left anterior descending.Max pressure = 20 bijan. Total duration = 2 seconds.     Max pressure = 20 bijan. Total duration = 8 seconds.    Balloon reinflated a second time: Max pressure = 20 bijan. Total duration = 8 seconds.

## 2022-05-16 NOTE — Clinical Note
Single balloon inflation. The first balloon was inserted into the left anterior descending.Max pressure = 12 bijan. Total duration = 3 seconds.

## 2022-05-16 NOTE — Clinical Note
Ultrasound catheter inserted for high resolution imaging into left anterior descending and ostium left anterior descending.

## 2022-05-16 NOTE — Clinical Note
Stent deployed in the proximal left anterior descending. Max pressure = 12 bijan. Total duration = 5 seconds.

## 2022-05-16 NOTE — Clinical Note
The first balloon was inserted into the left anterior descending and proximal left anterior descending.Max pressure = 13 bijan. Total duration = 3 seconds.

## 2022-05-16 NOTE — Clinical Note
Single balloon inflation. The first balloon was inserted into the left anterior descending and ostium left anterior descending.Max pressure = 12 bijan. Total duration = 10 seconds.

## 2022-05-16 NOTE — Clinical Note
Stent deployed in the proximal left anterior descending. Max pressure = 12 bijan. Total duration = 2 seconds.

## 2022-05-17 ENCOUNTER — APPOINTMENT (OUTPATIENT)
Dept: CARDIOLOGY | Facility: CLINIC | Age: 62
DRG: 246 | End: 2022-05-17
Attending: INTERNAL MEDICINE
Payer: COMMERCIAL

## 2022-05-17 ENCOUNTER — APPOINTMENT (OUTPATIENT)
Dept: OCCUPATIONAL THERAPY | Facility: CLINIC | Age: 62
DRG: 246 | End: 2022-05-17
Attending: INTERNAL MEDICINE
Payer: COMMERCIAL

## 2022-05-17 ENCOUNTER — TELEPHONE (OUTPATIENT)
Dept: CARDIOLOGY | Facility: CLINIC | Age: 62
End: 2022-05-17

## 2022-05-17 ENCOUNTER — APPOINTMENT (OUTPATIENT)
Dept: CT IMAGING | Facility: CLINIC | Age: 62
DRG: 246 | End: 2022-05-17
Attending: INTERNAL MEDICINE
Payer: COMMERCIAL

## 2022-05-17 ENCOUNTER — APPOINTMENT (OUTPATIENT)
Dept: GENERAL RADIOLOGY | Facility: CLINIC | Age: 62
DRG: 246 | End: 2022-05-17
Attending: INTERNAL MEDICINE
Payer: COMMERCIAL

## 2022-05-17 LAB
ALBUMIN SERPL-MCNC: 3.2 G/DL (ref 3.4–5)
ALBUMIN SERPL-MCNC: 3.4 G/DL (ref 3.4–5)
ALP SERPL-CCNC: 85 U/L (ref 40–150)
ALP SERPL-CCNC: 88 U/L (ref 40–150)
ALT SERPL W P-5'-P-CCNC: 137 U/L (ref 0–70)
ALT SERPL W P-5'-P-CCNC: 145 U/L (ref 0–70)
ANION GAP SERPL CALCULATED.3IONS-SCNC: 11 MMOL/L (ref 3–14)
ANION GAP SERPL CALCULATED.3IONS-SCNC: 11 MMOL/L (ref 3–14)
AST SERPL W P-5'-P-CCNC: 104 U/L (ref 0–45)
AST SERPL W P-5'-P-CCNC: 80 U/L (ref 0–45)
ATRIAL RATE - MUSE: 100 BPM
BASOPHILS # BLD AUTO: 0 10E3/UL (ref 0–0.2)
BASOPHILS NFR BLD AUTO: 0 %
BILIRUB SERPL-MCNC: 0.4 MG/DL (ref 0.2–1.3)
BILIRUB SERPL-MCNC: 0.4 MG/DL (ref 0.2–1.3)
BUN SERPL-MCNC: 28 MG/DL (ref 7–30)
BUN SERPL-MCNC: 29 MG/DL (ref 7–30)
CALCIUM SERPL-MCNC: 8.8 MG/DL (ref 8.5–10.1)
CALCIUM SERPL-MCNC: 9.3 MG/DL (ref 8.5–10.1)
CHLORIDE BLD-SCNC: 104 MMOL/L (ref 94–109)
CHLORIDE BLD-SCNC: 106 MMOL/L (ref 94–109)
CO2 SERPL-SCNC: 21 MMOL/L (ref 20–32)
CO2 SERPL-SCNC: 24 MMOL/L (ref 20–32)
CREAT SERPL-MCNC: 0.97 MG/DL (ref 0.66–1.25)
CREAT SERPL-MCNC: 1.07 MG/DL (ref 0.66–1.25)
DIASTOLIC BLOOD PRESSURE - MUSE: NORMAL MMHG
EOSINOPHIL # BLD AUTO: 0 10E3/UL (ref 0–0.7)
EOSINOPHIL NFR BLD AUTO: 0 %
ERYTHROCYTE [DISTWIDTH] IN BLOOD BY AUTOMATED COUNT: 18.2 % (ref 10–15)
GFR SERPL CREATININE-BSD FRML MDRD: 79 ML/MIN/1.73M2
GFR SERPL CREATININE-BSD FRML MDRD: 89 ML/MIN/1.73M2
GLUCOSE BLD-MCNC: 114 MG/DL (ref 70–99)
GLUCOSE BLD-MCNC: 120 MG/DL (ref 70–99)
HCT VFR BLD AUTO: 27.9 % (ref 40–53)
HGB BLD-MCNC: 8.5 G/DL (ref 13.3–17.7)
IMM GRANULOCYTES # BLD: 0.1 10E3/UL
IMM GRANULOCYTES NFR BLD: 1 %
INR PPP: 1.3 (ref 0.85–1.15)
INTERPRETATION ECG - MUSE: NORMAL
LVEF ECHO: NORMAL
LYMPHOCYTES # BLD AUTO: 1.7 10E3/UL (ref 0.8–5.3)
LYMPHOCYTES NFR BLD AUTO: 15 %
MAGNESIUM SERPL-MCNC: 2.3 MG/DL (ref 1.6–2.3)
MCH RBC QN AUTO: 28.1 PG (ref 26.5–33)
MCHC RBC AUTO-ENTMCNC: 30.5 G/DL (ref 31.5–36.5)
MCV RBC AUTO: 92 FL (ref 78–100)
MONOCYTES # BLD AUTO: 0.8 10E3/UL (ref 0–1.3)
MONOCYTES NFR BLD AUTO: 8 %
NEUTROPHILS # BLD AUTO: 8.3 10E3/UL (ref 1.6–8.3)
NEUTROPHILS NFR BLD AUTO: 76 %
NRBC # BLD AUTO: 0 10E3/UL
NRBC BLD AUTO-RTO: 0 /100
P AXIS - MUSE: 69 DEGREES
PLATELET # BLD AUTO: 364 10E3/UL (ref 150–450)
POTASSIUM BLD-SCNC: 3.4 MMOL/L (ref 3.4–5.3)
POTASSIUM BLD-SCNC: 3.6 MMOL/L (ref 3.4–5.3)
POTASSIUM BLD-SCNC: 3.9 MMOL/L (ref 3.4–5.3)
PR INTERVAL - MUSE: 208 MS
PREALB SERPL IA-MCNC: 22 MG/DL (ref 15–45)
PROT SERPL-MCNC: 6.9 G/DL (ref 6.8–8.8)
PROT SERPL-MCNC: 7 G/DL (ref 6.8–8.8)
QRS DURATION - MUSE: 148 MS
QT - MUSE: 368 MS
QTC - MUSE: 474 MS
R AXIS - MUSE: -41 DEGREES
RBC # BLD AUTO: 3.02 10E6/UL (ref 4.4–5.9)
SODIUM SERPL-SCNC: 138 MMOL/L (ref 133–144)
SODIUM SERPL-SCNC: 139 MMOL/L (ref 133–144)
SYSTOLIC BLOOD PRESSURE - MUSE: NORMAL MMHG
T AXIS - MUSE: 95 DEGREES
VENTRICULAR RATE- MUSE: 100 BPM
WBC # BLD AUTO: 10.9 10E3/UL (ref 4–11)

## 2022-05-17 PROCEDURE — 999N000208 ECHOCARDIOGRAM COMPLETE

## 2022-05-17 PROCEDURE — 85610 PROTHROMBIN TIME: CPT

## 2022-05-17 PROCEDURE — 36415 COLL VENOUS BLD VENIPUNCTURE: CPT

## 2022-05-17 PROCEDURE — 70486 CT MAXILLOFACIAL W/O DYE: CPT | Mod: 26 | Performed by: RADIOLOGY

## 2022-05-17 PROCEDURE — 255N000002 HC RX 255 OP 636: Performed by: INTERNAL MEDICINE

## 2022-05-17 PROCEDURE — 83735 ASSAY OF MAGNESIUM: CPT

## 2022-05-17 PROCEDURE — 70486 CT MAXILLOFACIAL W/O DYE: CPT

## 2022-05-17 PROCEDURE — 250N000013 HC RX MED GY IP 250 OP 250 PS 637: Performed by: STUDENT IN AN ORGANIZED HEALTH CARE EDUCATION/TRAINING PROGRAM

## 2022-05-17 PROCEDURE — 85025 COMPLETE CBC W/AUTO DIFF WBC: CPT

## 2022-05-17 PROCEDURE — 82040 ASSAY OF SERUM ALBUMIN: CPT | Performed by: STUDENT IN AN ORGANIZED HEALTH CARE EDUCATION/TRAINING PROGRAM

## 2022-05-17 PROCEDURE — 250N000013 HC RX MED GY IP 250 OP 250 PS 637

## 2022-05-17 PROCEDURE — 36415 COLL VENOUS BLD VENIPUNCTURE: CPT | Performed by: STUDENT IN AN ORGANIZED HEALTH CARE EDUCATION/TRAINING PROGRAM

## 2022-05-17 PROCEDURE — 250N000013 HC RX MED GY IP 250 OP 250 PS 637: Performed by: INTERNAL MEDICINE

## 2022-05-17 PROCEDURE — 93306 TTE W/DOPPLER COMPLETE: CPT | Mod: 26 | Performed by: INTERNAL MEDICINE

## 2022-05-17 PROCEDURE — 214N000001 HC R&B CCU UMMC

## 2022-05-17 PROCEDURE — 97530 THERAPEUTIC ACTIVITIES: CPT | Mod: GO

## 2022-05-17 PROCEDURE — 97535 SELF CARE MNGMENT TRAINING: CPT | Mod: GO

## 2022-05-17 PROCEDURE — 71045 X-RAY EXAM CHEST 1 VIEW: CPT

## 2022-05-17 PROCEDURE — 250N000011 HC RX IP 250 OP 636: Performed by: STUDENT IN AN ORGANIZED HEALTH CARE EDUCATION/TRAINING PROGRAM

## 2022-05-17 PROCEDURE — 99223 1ST HOSP IP/OBS HIGH 75: CPT | Mod: GC | Performed by: INTERNAL MEDICINE

## 2022-05-17 PROCEDURE — 250N000012 HC RX MED GY IP 250 OP 636 PS 637

## 2022-05-17 PROCEDURE — 84132 ASSAY OF SERUM POTASSIUM: CPT | Performed by: STUDENT IN AN ORGANIZED HEALTH CARE EDUCATION/TRAINING PROGRAM

## 2022-05-17 PROCEDURE — 71045 X-RAY EXAM CHEST 1 VIEW: CPT | Mod: 26 | Performed by: RADIOLOGY

## 2022-05-17 PROCEDURE — 99233 SBSQ HOSP IP/OBS HIGH 50: CPT | Mod: 25 | Performed by: INTERNAL MEDICINE

## 2022-05-17 PROCEDURE — 250N000011 HC RX IP 250 OP 636

## 2022-05-17 PROCEDURE — 97166 OT EVAL MOD COMPLEX 45 MIN: CPT | Mod: GO

## 2022-05-17 PROCEDURE — 80053 COMPREHEN METABOLIC PANEL: CPT

## 2022-05-17 RX ORDER — FUROSEMIDE 10 MG/ML
40 INJECTION INTRAMUSCULAR; INTRAVENOUS
Status: DISCONTINUED | OUTPATIENT
Start: 2022-05-18 | End: 2022-05-18

## 2022-05-17 RX ORDER — POTASSIUM CHLORIDE 750 MG/1
20 TABLET, EXTENDED RELEASE ORAL ONCE
Status: COMPLETED | OUTPATIENT
Start: 2022-05-17 | End: 2022-05-17

## 2022-05-17 RX ORDER — POTASSIUM CHLORIDE 750 MG/1
40 TABLET, EXTENDED RELEASE ORAL ONCE
Status: COMPLETED | OUTPATIENT
Start: 2022-05-17 | End: 2022-05-17

## 2022-05-17 RX ORDER — FUROSEMIDE 10 MG/ML
80 INJECTION INTRAMUSCULAR; INTRAVENOUS ONCE
Status: COMPLETED | OUTPATIENT
Start: 2022-05-17 | End: 2022-05-17

## 2022-05-17 RX ORDER — MULTIPLE VITAMINS W/ MINERALS TAB 9MG-400MCG
1 TAB ORAL
Status: DISCONTINUED | OUTPATIENT
Start: 2022-05-17 | End: 2022-05-26 | Stop reason: HOSPADM

## 2022-05-17 RX ORDER — LORAZEPAM 0.5 MG/1
.5-1 TABLET ORAL EVERY 6 HOURS PRN
Status: DISCONTINUED | OUTPATIENT
Start: 2022-05-17 | End: 2022-05-26 | Stop reason: HOSPADM

## 2022-05-17 RX ORDER — LORAZEPAM 1 MG/1
2 TABLET ORAL
Status: COMPLETED | OUTPATIENT
Start: 2022-05-17 | End: 2022-05-18

## 2022-05-17 RX ORDER — MAGNESIUM HYDROXIDE/ALUMINUM HYDROXICE/SIMETHICONE 120; 1200; 1200 MG/30ML; MG/30ML; MG/30ML
30 SUSPENSION ORAL EVERY 4 HOURS PRN
Status: DISCONTINUED | OUTPATIENT
Start: 2022-05-17 | End: 2022-05-26 | Stop reason: HOSPADM

## 2022-05-17 RX ORDER — ZOLPIDEM TARTRATE 5 MG/1
5 TABLET ORAL
Status: DISCONTINUED | OUTPATIENT
Start: 2022-05-17 | End: 2022-05-26 | Stop reason: HOSPADM

## 2022-05-17 RX ORDER — POTASSIUM CHLORIDE 750 MG/1
40 TABLET, EXTENDED RELEASE ORAL ONCE
Status: DISCONTINUED | OUTPATIENT
Start: 2022-05-17 | End: 2022-05-17

## 2022-05-17 RX ADMIN — HYDROXYCHLOROQUINE SULFATE 200 MG: 200 TABLET, FILM COATED ORAL at 07:52

## 2022-05-17 RX ADMIN — Medication 1 TABLET: at 14:36

## 2022-05-17 RX ADMIN — FAMOTIDINE 20 MG: 20 TABLET ORAL at 07:51

## 2022-05-17 RX ADMIN — FUROSEMIDE 80 MG: 10 INJECTION, SOLUTION INTRAVENOUS at 11:54

## 2022-05-17 RX ADMIN — FUROSEMIDE 80 MG: 10 INJECTION, SOLUTION INTRAVENOUS at 20:09

## 2022-05-17 RX ADMIN — ATORVASTATIN CALCIUM 40 MG: 40 TABLET, FILM COATED ORAL at 07:51

## 2022-05-17 RX ADMIN — FUROSEMIDE 40 MG: 40 TABLET ORAL at 07:52

## 2022-05-17 RX ADMIN — HYDROXYZINE HYDROCHLORIDE 50 MG: 50 TABLET ORAL at 02:46

## 2022-05-17 RX ADMIN — FERROUS SULFATE TAB 325 MG (65 MG ELEMENTAL FE) 325 MG: 325 (65 FE) TAB at 11:59

## 2022-05-17 RX ADMIN — HYDROXYZINE HYDROCHLORIDE 50 MG: 50 TABLET ORAL at 20:06

## 2022-05-17 RX ADMIN — HYDROXYCHLOROQUINE SULFATE 200 MG: 200 TABLET, FILM COATED ORAL at 20:06

## 2022-05-17 RX ADMIN — Medication 3 MG: at 01:22

## 2022-05-17 RX ADMIN — HYDROXYZINE HYDROCHLORIDE 50 MG: 50 TABLET ORAL at 07:47

## 2022-05-17 RX ADMIN — LORAZEPAM 0.5 MG: 0.5 TABLET ORAL at 23:07

## 2022-05-17 RX ADMIN — HUMAN ALBUMIN MICROSPHERES AND PERFLUTREN 9 ML: 10; .22 INJECTION, SOLUTION INTRAVENOUS at 11:02

## 2022-05-17 RX ADMIN — POTASSIUM CHLORIDE 40 MEQ: 750 TABLET, EXTENDED RELEASE ORAL at 20:05

## 2022-05-17 RX ADMIN — ENOXAPARIN SODIUM 70 MG: 80 INJECTION SUBCUTANEOUS at 20:16

## 2022-05-17 RX ADMIN — LORAZEPAM 0.5 MG: 0.5 TABLET ORAL at 20:06

## 2022-05-17 RX ADMIN — ASPIRIN 81 MG: 81 TABLET, COATED ORAL at 07:51

## 2022-05-17 RX ADMIN — Medication 12.5 MG: at 20:06

## 2022-05-17 RX ADMIN — MYCOPHENOLATE MOFETIL 1500 MG: 250 CAPSULE ORAL at 07:51

## 2022-05-17 RX ADMIN — ENOXAPARIN SODIUM 70 MG: 80 INJECTION SUBCUTANEOUS at 07:55

## 2022-05-17 RX ADMIN — FAMOTIDINE 20 MG: 20 TABLET ORAL at 20:06

## 2022-05-17 RX ADMIN — TAMSULOSIN HYDROCHLORIDE 0.4 MG: 0.4 CAPSULE ORAL at 07:52

## 2022-05-17 RX ADMIN — ACETAMINOPHEN 650 MG: 325 TABLET ORAL at 23:54

## 2022-05-17 RX ADMIN — LORAZEPAM 0.5 MG: 0.5 TABLET ORAL at 08:52

## 2022-05-17 RX ADMIN — Medication 5 MG: at 21:08

## 2022-05-17 RX ADMIN — ALUMINUM HYDROXIDE, MAGNESIUM HYDROXIDE, AND SIMETHICONE 30 ML: 200; 200; 20 SUSPENSION ORAL at 02:40

## 2022-05-17 RX ADMIN — LORAZEPAM 0.5 MG: 0.5 TABLET ORAL at 14:39

## 2022-05-17 RX ADMIN — POTASSIUM CHLORIDE 20 MEQ: 750 TABLET, EXTENDED RELEASE ORAL at 13:14

## 2022-05-17 RX ADMIN — HYDROXYZINE HYDROCHLORIDE 50 MG: 50 TABLET ORAL at 14:58

## 2022-05-17 RX ADMIN — ZOLPIDEM TARTRATE 5 MG: 5 TABLET, FILM COATED ORAL at 23:54

## 2022-05-17 ASSESSMENT — ACTIVITIES OF DAILY LIVING (ADL)
ADLS_ACUITY_SCORE: 19
ADLS_ACUITY_SCORE: 17
ADLS_ACUITY_SCORE: 19
ADLS_ACUITY_SCORE: 17
ADLS_ACUITY_SCORE: 19
ADLS_ACUITY_SCORE: 19
ADLS_ACUITY_SCORE: 17
PREVIOUS_RESPONSIBILITIES: MEAL PREP;HOUSEKEEPING;LAUNDRY;SHOPPING;YARDWORK;MEDICATION MANAGEMENT;FINANCES;DRIVING

## 2022-05-17 NOTE — PROGRESS NOTES
05/17/22 1413   Quick Adds   Type of Visit Initial Occupational Therapy Evaluation   Living Environment   People in Home alone   Current Living Arrangements house   Home Accessibility stairs to enter home   Number of Stairs, Main Entrance 3   Stair Railings, Main Entrance none   Transportation Anticipated car, drives self;family or friend will provide   Living Environment Comments Pt lives alone with a cat in single level home, 3 THANG - no hand rails. Pt has a tub shower, no grab bars/shower chair.   Self-Care   Usual Activity Tolerance fair   Current Activity Tolerance fair   Regular Exercise No   Equipment Currently Used at Home none   Fall history within last six months no   Activity/Exercise/Self-Care Comment Pt reports being IND w/ ADL completion PTA, no AD use   Instrumental Activities of Daily Living (IADL)   Previous Responsibilities meal prep;housekeeping;laundry;shopping;yardwork;medication management;finances;driving   IADL Comments Pt reports being IND w/ IADL completion at baseline, states family/friends live within 20 miles and are able to assist if needed   General Information   Onset of Illness/Injury or Date of Surgery 05/16/22   Referring Physician Keri Escobar MD   Patient/Family Therapy Goal Statement (OT) Return home when able and increase activity tolerance   Additional Occupational Profile Info/Pertinent History of Current Problem Dandy Sands is a 61 year old male with history of CAD s/p remote PCI to LAD, ICM LVEF 30% s/p CRT-D, severe MR, 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   Existing Precautions/Restrictions cardiac   General Observations and Info Activity: Up ad dianna   Cognitive Status  Examination   Orientation Status orientation to person, place and time   Cognitive Status Comments No acute deficits identified, appears WFL overall. Pt reports some anxiety related to current health concerns. Will continue to monitor   Visual Perception   Visual Impairment/Limitations corrective lenses for reading   Impact of Vision Impairment on Function (Vision) No acute deficits   Sensory   Sensory Quick Adds No deficits were identified   Pain Assessment   Patient Currently in Pain No   Integumentary/Edema   Integumentary/Edema Comments Mild generalized edema per pt report, will continue to monitor, currently improving w/ diuretics   Posture   Posture forward head position   Range of Motion Comprehensive   General Range of Motion bilateral upper extremity ROM WFL   Strength Comprehensive (MMT)   General Manual Muscle Testing (MMT) Assessment no strength deficits identified   Muscle Tone Assessment   Muscle Tone Quick Adds No deficits were identified   Coordination   Upper Extremity Coordination No deficits were identified   Transfers   Transfer Comments IND   Activities of Daily Living   BADL Assessment/Intervention   (IND w/ ADL completion currently, will continue to monitor)   Clinical Impression   Criteria for Skilled Therapeutic Interventions Met (OT) Yes, treatment indicated   OT Diagnosis Decreased activity tolerance impacting IND w/ ADLs/IADls   OT Problem List-Impairments impacting ADL problems related to;activity tolerance impaired;strength   Assessment of Occupational Performance 1-3 Performance Deficits   Identified Performance Deficits Functional mobility/transfers, home mgmt, heavy IADLs  (Will re-assess following surgery if performed during this admission)   Planned Therapy Interventions (OT) IADL retraining;strengthening;home program guidelines;progressive activity/exercise;risk factor education   Clinical Decision Making Complexity (OT) moderate complexity   Anticipated Equipment Needs Upon  Discharge (OT) shower chair;other (see comments)  (TBD)   Risk & Benefits of therapy have been explained evaluation/treatment results reviewed;care plan/treatment goals reviewed;risks/benefits reviewed;current/potential barriers reviewed;participants voiced agreement with care plan;participants included;patient   Clinical Impression Comments Pt will benefit from skilled OT services to progress ADL/IADL I and support safe discharge plan   OT Discharge Planning   OT Discharge Recommendation (DC Rec) home with assist;home with outpatient cardiac rehab   OT Rationale for DC Rec Pt currently below baseline, limited by deconditioning and low activity tolerance. Currently, pt would be safe to d/c home however pt undergoing work up for CABG/MVR, will re-assess status following surgery and update recs as indicated. Recommend OP CR   OT Brief overview of current status IND, please encourage ambulation   Total Evaluation Time (Minutes)   Total Evaluation Time (Minutes) 5   OT Goals   Therapy Frequency (OT) 3 times/wk   OT Predicted Duration/Target Date for Goal Attainment 05/31/22   OT Goals Cardiac Phase 1;Home Management   OT: Home Management Modified independent;with moderate demand household tasks   OT: Understanding of cardiac education to maximize quality of life, condition management, and health outcomes Patient   OT: Perform aerobic activity with stable cardiovascular response continuous;10 minutes;ambulation;NuStep   OT: Functional/aerobic ambulation tolerance with stable cardiovascular response in order to return to home and community environment Modified independent;Greater than 300 feet   OT: Navigation of stairs simulating home set up with stable cardiovascular response in order to return to home and community environment Modified independent;3 stairs

## 2022-05-17 NOTE — PLAN OF CARE
Neuro: A/Ox4. Anxious. Had Melatonin, Ativan and Hydroxyzine for sleep and still woke up frequently.   Cardiac: ST with . VSS. BP soft 90/70   Respiratory: O2 sats stable on RA 95%  GI/:  Voiding in urinal, had large incontinent BM after c/o stomach ache overnight. Stated he had one emesis and felt better after. Stated he thinks he felt bad from the pizza he ate yesterday.   Diet/appetite: 2 gm NA, 2000 FR  Activity:  UAL  Pain: Denies pain  Skin: Blanchable redness on coccyx  LDA's: PIV sl'd    Plan: Continue with POC. Notify primary team with changes.

## 2022-05-17 NOTE — CONSULTS
[facility] Inpatient Consult Rheumatology Note    Impression/Plan:  61 year old male with history of CAD s/p remote PCI to LAD, ICM LVEF 30% s/p CRT-D, severe MR, SLE and APL with hx of DVT/PE on chronic anticoagulation with warfarin, hx of COVID-19 1/2022 (was hospitalized, not intubated), HTN, and HLD who was admitted to [facility] 5/16/2022 as a transfer from [facility] in [place] for evaluation of multivessel coronary artery disease and moderate-severe functional mitral regurgitation. Rheumatology consulted for evaluation of his current immunosuppressive regimen in setting of anticipated CABG.     #. Previously diagnosed APL w/ prior PE and DVT on chronic AC   #. Previously diagnosed SLE (LASHANDA+, dsDNA+, anti-Smith+, anti-RNP ab+, anti-histone ab+)  Overall lupus symptoms are well controlled on current regimen of plaquenil and MMF. In general for patients with compensated lupus undergoing elective surgery we recommend holding MMF one week prior to surgery and re-starting 3-5 days after surgery in the absence of surgical site infection, poor wound healing or infection elsewhere. This is based on consensus recommendations for patients undergoing elective orthopedic VEL or TKA and the level of evidence is considered low ( et al in the Journal of Arthritis and Rheumatology, 2017). For this patient undergoing evaluation for CABG, we recommend stopping MMF now in the event his surgery is imminent. Postponement of a necessary surgery just for holding his MMF is not indicated in his case. However, if his surgery is going to be delayed for other causes, then ideally his MMF would be held for 1 week prior.   - Ok to continue hydroxychloroquine 200mg BID throughout franklin-operative period   - Recommend holding mycophenolate mofetil for now prior to surgery if surgery is imminent; can re-start 7 days after surgery if wound healing progressing as expected and no concern for infection. If  "surgery will not be for some time, then would recommend stopping 1 week prior to planned surgery.     - Deirdre-operative anticoagulation plan per primary team; patient on chronic AC in setting of APL.Recommend transition back to warfarin when able.     Thank you for the rheumatology consultation. Please do not hesitate to contact the team  for any additional questions or concerns. We will sign off at this time.      Patient seen and evaluated with attending physician, Dr. Sterling.     Stacy Muniz MD/MPH  Internal Medicine/Dermatology  PGY-5    I saw this patient with the medical resident. I agree with the stated findings and recommendations which reflect our joint impressions and plan. Systemic lupus with musculoskeletal, cutaneous, and remote nephritis features is well-controlled on mycophenolate and hydroxychloroquine. I recommend holding MMF for up to 7 days prior to planned open heart surgery, and for 7 days afterwards.    James Sterling M.D.  Staff Rheumatologist, [facility]  Pager [phone]        Date of Admission: May 10, 2022   Encounter Date: 05/17/2022    Reason for Consultation:   \"Has a history of lupus, on immunosuppression with mycophenolate 1500 mg BID, consulting to assess whether he can hold or reduce doses of immunosuppression as we consider CABG/MVR\"    History of Present Illness:  61 year old male with history of CAD s/p remote PCI to LAD, ICM LVEF 30% s/p CRT-D, severe MR, SLE and APL with hx of DVT/PE on chronic anticoagulation with warfarin, hx of COVID-19 1/2022 (was hospitalized, not intubated), HTN, and HLD who was admitted to [facility] 5/16/2022 as a transfer from [facility] in [place] for evaluation of multivessel coronary artery disease and moderate-severe functional mitral regurgitation. Rheumatology consulted for evaluation of his current immunosuppressive regimen in setting of anticipated CABG.     Rheumatology history:   Patient reports being diagnosed with lupus about " 15-20 years ago when he presented with numerous blood clots, rash and swelling all over his body. He reports a history of extreme sun-sensitivity as well. He gets occasional episodes of posterior scleritis. His main symptom currently is joint pain but it is relatively well controlled. He has no history of nephritis.     He has been on plaquenil for many years. He was previously on azathioprine until summer of 2020 when he had some increased arthritis, anemia and mild proteinuria. He was switched to cellcept at that time. He has also been on chronic anticoagulation with warfarin.     Primary rheumatologist is Dr. Stanley Garcia in [place], [place]. Dr. Garcia was contacted for collateral information on his rheumatology history this admission.     Lupus labs 7/2011 available in [facility]:   - dsDNA 1:160  - LASHANDA 1:320 nucleolar   - C3/C3 wnl     Otherwise other serologies that have been positive included: anti-Smith ab, anti-RNP ab, anti-histone ab. SSA/SSB have been normal in the past.     Past Medical History:   Patient Active Problem List   Diagnosis     Decompensated heart failure (H)     No past medical history on file.  No past surgical history on file.    Social History:  Former smoker.     Family History:  No family history of SLE or other autoimmune conditions.     Medications:  Current Facility-Administered Medications   Medication     acetaminophen (TYLENOL) tablet 650 mg     alum & mag hydroxide-simethicone (MAALOX) suspension 30 mL     aspirin EC tablet 81 mg     atorvastatin (LIPITOR) tablet 40 mg     enoxaparin ANTICOAGULANT (LOVENOX) injection 70 mg     famotidine (PEPCID) tablet 20 mg     ferrous sulfate (FEROSUL) tablet 325 mg     furosemide (LASIX) tablet 40 mg     HOLD nitroGLYcerin IF     hydroxychloroquine (PLAQUENIL) tablet 200 mg     hydrOXYzine (ATARAX) tablet 25 mg    Or     hydrOXYzine (ATARAX) tablet 50 mg     LORazepam (ATIVAN) tablet 0.5 mg     melatonin tablet 3-6 mg      "metoprolol succinate ER (TOPROL-XL) 24 hr half-tab 12.5 mg     mycophenolate (GENERIC EQUIVALENT) capsule 1,500 mg     Patient is already receiving anticoagulation with heparin, enoxaparin (LOVENOX), warfarin (COUMADIN)  or other anticoagulant medication     Reason ACE/ARB/ARNI order not selected     Reason beta blocker not prescribed     tamsulosin (FLOMAX) capsule 0.4 mg     No Known Allergies    Physical exam:  Vitals: BP 96/72   Pulse 92   Temp 97.9  F (36.6  C) (Axillary)   Resp 16   Ht 1.715 m (5' 7.5\")   Wt 71.5 kg (157 lb 11.2 oz)   SpO2 99%   BMI 24.33 kg/m    GEN: Resting comfortably in bed, NAD.    HEENT: NC/AT, EOMI, MMM. No oral ulcers.   Cardiac: RRR, systolic murmur at apex.    Resp: Breathing comfortably on RA, no use of accessory muscles. No crackles or wheezes.   Abd: Soft, non-tender and non-distended.   MSK: No synovitis of the bilateral hands. No tenderness to palpation of hand joints.   Skin: Warm and well perfused, no rashes. No distinctive nailfold capillary changes.   Neuro: A&Ox3, CNII-XII grossly intact.     Laboratory:  Results for orders placed or performed during the hospital encounter of 05/16/22 (from the past 24 hour(s))   EKG 12 lead   Result Value Ref Range    Systolic Blood Pressure  mmHg    Diastolic Blood Pressure  mmHg    Ventricular Rate 100 BPM    Atrial Rate 100 BPM    ME Interval 208 ms    QRS Duration 148 ms     ms    QTc 474 ms    P Axis 69 degrees    R AXIS -41 degrees    T Axis 95 degrees    Interpretation ECG       Sinus rhythm  Left axis deviation  Non-specific intra-ventricular conduction block  Abnormal ECG  No previous ECGs available     Comprehensive metabolic panel   Result Value Ref Range    Sodium 135 133 - 144 mmol/L    Potassium 4.7 3.4 - 5.3 mmol/L    Chloride 103 94 - 109 mmol/L    Carbon Dioxide (CO2) 23 20 - 32 mmol/L    Anion Gap 9 3 - 14 mmol/L    Urea Nitrogen 26 7 - 30 mg/dL    Creatinine 0.96 0.66 - 1.25 mg/dL    Calcium 9.6 8.5 - 10.1 " mg/dL    Glucose 131 (H) 70 - 99 mg/dL    Alkaline Phosphatase 90 40 - 150 U/L    AST 44 0 - 45 U/L    ALT 56 0 - 70 U/L    Protein Total 7.4 6.8 - 8.8 g/dL    Albumin 3.5 3.4 - 5.0 g/dL    Bilirubin Total 0.4 0.2 - 1.3 mg/dL    GFR Estimate 90 >60 mL/min/1.73m2   Magnesium   Result Value Ref Range    Magnesium 2.2 1.6 - 2.3 mg/dL   CBC with Differential    Narrative    The following orders were created for panel order CBC with Differential.  Procedure                               Abnormality         Status                     ---------                               -----------         ------                     CBC with platelets and d...[[id number]]  Abnormal            Final result                 Please view results for these tests on the individual orders.   N - Terminal Pro BNP Inpatient   Result Value Ref Range    N terminal Pro BNP Inpatient 5,955 (H) 0 - 900 pg/mL   INR   Result Value Ref Range    INR 1.23 (H) 0.85 - 1.15   Lipid panel   Result Value Ref Range    Cholesterol 98 <200 mg/dL    Triglycerides 96 <150 mg/dL    Direct Measure HDL 34 (L) >=40 mg/dL    LDL Cholesterol Calculated 45 <=100 mg/dL    Non HDL Cholesterol 64 <130 mg/dL    Narrative    Cholesterol  Desirable:  <200 mg/dL    Triglycerides  Normal:  Less than 150 mg/dL  Borderline High:  150-199 mg/dL  High:  200-499 mg/dL  Very High:  Greater than or equal to 500 mg/dL    Direct Measure HDL  Female:  Greater than or equal to 50 mg/dL   Male:  Greater than or equal to 40 mg/dL    LDL Cholesterol  Desirable:  <100mg/dL  Above Desirable:  100-129 mg/dL   Borderline High:  130-159 mg/dL   High:  160-189 mg/dL   Very High:  >= 190 mg/dL    Non HDL Cholesterol  Desirable:  130 mg/dL  Above Desirable:  130-159 mg/dL  Borderline High:  160-189 mg/dL  High:  190-219 mg/dL  Very High:  Greater than or equal to 220 mg/dL   TSH with free T4 reflex   Result Value Ref Range    TSH 1.02 0.40 - 4.00 mU/L   CBC with platelets and differential   Result  Value Ref Range    WBC Count 12.6 (H) 4.0 - 11.0 10e3/uL    RBC Count 3.08 (L) 4.40 - 5.90 10e6/uL    Hemoglobin 8.8 (L) 13.3 - 17.7 g/dL    Hematocrit 28.4 (L) 40.0 - 53.0 %    MCV 92 78 - 100 fL    MCH 28.6 26.5 - 33.0 pg    MCHC 31.0 (L) 31.5 - 36.5 g/dL    RDW 18.2 (H) 10.0 - 15.0 %    Platelet Count 344 150 - 450 10e3/uL    % Neutrophils 84 %    % Lymphocytes 7 %    % Monocytes 8 %    % Eosinophils 0 %    % Basophils 0 %    % Immature Granulocytes 1 %    NRBCs per 100 WBC 0 <1 /100    Absolute Neutrophils 10.7 (H) 1.6 - 8.3 10e3/uL    Absolute Lymphocytes 0.9 0.8 - 5.3 10e3/uL    Absolute Monocytes 1.0 0.0 - 1.3 10e3/uL    Absolute Eosinophils 0.0 0.0 - 0.7 10e3/uL    Absolute Basophils 0.0 0.0 - 0.2 10e3/uL    Absolute Immature Granulocytes 0.1 <=0.4 10e3/uL    Absolute NRBCs 0.0 10e3/uL   Hepatic function panel   Result Value Ref Range    Bilirubin Total 0.4 0.2 - 1.3 mg/dL    Bilirubin Direct 0.2 0.0 - 0.2 mg/dL    Protein Total 7.4 6.8 - 8.8 g/dL    Albumin 3.5 3.4 - 5.0 g/dL    Alkaline Phosphatase 90 40 - 150 U/L    AST 44 0 - 45 U/L    ALT 56 0 - 70 U/L   Partial thromboplastin time   Result Value Ref Range    aPTT 53 (H) 22 - 38 Seconds   ABO/Rh type and screen    Narrative    The following orders were created for panel order ABO/Rh type and screen.  Procedure                               Abnormality         Status                     ---------                               -----------         ------                     Adult Type and Screen[[id number]]                            Final result                 Please view results for these tests on the individual orders.   Adult Type and Screen   Result Value Ref Range    ABO/RH(D) O POS     Antibody Screen Negative    CT Chest w/o Contrast    Narrative    CT chest without contrast    INDICATION: Preoperative CABG    COMPARISON: None    FINDINGS: The included thyroid appears normal. Implantable cardiac  defibrillator from left subclavian transvenous  approach. Multivessel  coronary artery calcium. Calcifications in the left ventricle wall.  Left atrial enlargement. No pleural or pericardial effusion. IVC  filter. Abdominal aortic calcifications. No adenopathy in the chest.  Bullous disease in the lung apices with mild to moderate emphysematous  changes in the lungs. No dominant pulmonary nodule. Bony structures  appear grossly unremarkable. There are degenerative changes in the  thoracic spine and lower cervical spine.      Impression    IMPRESSION: Implantable cardiac defibrillator. Coronary artery  calcium. Aortic calcifications. Left atrial mild enlargement. Mild to  moderate emphysema. Calcification in the left ventricle wall.    CONSTANTINO OWEN MD         SYSTEM ID:  [id number]   UA reflex to Microscopic and Culture    Specimen: Urine, Clean Catch   Result Value Ref Range    Color Urine Light Yellow Colorless, Straw, Light Yellow, Yellow    Appearance Urine Clear Clear    Glucose Urine Negative Negative mg/dL    Bilirubin Urine Negative Negative    Ketones Urine Negative Negative mg/dL    Specific Gravity Urine 1.026 1.003 - 1.035    Blood Urine Negative Negative    pH Urine 5.5 5.0 - 7.0    Protein Albumin Urine 20  (A) Negative mg/dL    Urobilinogen Urine Normal Normal, 2.0 mg/dL    Nitrite Urine Negative Negative    Leukocyte Esterase Urine Negative Negative    Mucus Urine Present (A) None Seen /LPF    RBC Urine 0 <=2 /HPF    WBC Urine <1 <=5 /HPF    Narrative    Urine Culture not indicated   Asymptomatic COVID-19 Virus (Coronavirus) by PCR Nasopharyngeal    Specimen: Nasopharyngeal; Swab   Result Value Ref Range    SARS CoV2 PCR Negative Negative, Testing sent to reference lab. Results will be returned via unsolicited result    Narrative    Testing was performed using the Xpert Xpress SARS-CoV-2 Assay on the  Cepheid Gene-Xpert Instrument Systems. Additional information about  this Emergency Use Authorization (EUA) assay can be found via the  Lab  Guide. This test should be ordered for the detection of SARS-CoV-2 in  individuals who meet SARS-CoV-2 clinical and/or epidemiological  criteria. Test performance is unknown in asymptomatic patients. This  test is for in vitro diagnostic use under the FDA EUA for  laboratories certified under CLIA to perform high complexity testing.  This test has not been FDA cleared or approved. A negative result  does not rule out the presence of PCR inhibitors in the specimen or  target RNA in concentration below the limit of detection for the  assay. The possibility of a false negative should be considered if  the patient's recent exposure or clinical presentation suggests  COVID-19. This test was validated by the [facility]. This laboratory is certified under  the Clinical Laboratory Improvement Amendments of 1988 (CLIA-88) as  qualified to perform high complexity laboratory testing.     XR Chest 2 Views    Narrative    Exam: XR CHEST 2 VW, 5/16/2022 6:17 PM    Indication: pre-op cabg    Comparison: Same-day CT    Findings:   Left chest wall implantable cardiac defibrillator leads project over  the right ventricular apex, right atrial appendage, and coronary  sinus. Enlarged cardiac silhouette. Coronary artery stents. The  pulmonary vasculature is within normal limits. Bilateral peripheral  reticular opacities and emphysematous changes are better seen on  comparison CT. No pleural effusion or pneumothorax. Partially  visualized IVC filter.      Impression    Impression: No acute cardiopulmonary abnormality. Please refer to same  day chest CT for further details.    YESENIA KEITH DO         SYSTEM ID:  [id number]   CBC with Platelets & Differential    Narrative    The following orders were created for panel order CBC with Platelets & Differential.  Procedure                               Abnormality         Status                     ---------                                -----------         ------                     CBC with platelets and d...[[id number]]  Abnormal            Final result                 Please view results for these tests on the individual orders.   Magnesium   Result Value Ref Range    Magnesium 2.3 1.6 - 2.3 mg/dL   INR   Result Value Ref Range    INR 1.30 (H) 0.85 - 1.15   Comprehensive metabolic panel   Result Value Ref Range    Sodium 138 133 - 144 mmol/L    Potassium 3.9 3.4 - 5.3 mmol/L    Chloride 106 94 - 109 mmol/L    Carbon Dioxide (CO2) 21 20 - 32 mmol/L    Anion Gap 11 3 - 14 mmol/L    Urea Nitrogen 29 7 - 30 mg/dL    Creatinine 0.97 0.66 - 1.25 mg/dL    Calcium 9.3 8.5 - 10.1 mg/dL    Glucose 114 (H) 70 - 99 mg/dL    Alkaline Phosphatase 88 40 - 150 U/L     (H) 0 - 45 U/L     (H) 0 - 70 U/L    Protein Total 6.9 6.8 - 8.8 g/dL    Albumin 3.2 (L) 3.4 - 5.0 g/dL    Bilirubin Total 0.4 0.2 - 1.3 mg/dL    GFR Estimate 89 >60 mL/min/1.73m2   CBC with platelets and differential   Result Value Ref Range    WBC Count 10.9 4.0 - 11.0 10e3/uL    RBC Count 3.02 (L) 4.40 - 5.90 10e6/uL    Hemoglobin 8.5 (L) 13.3 - 17.7 g/dL    Hematocrit 27.9 (L) 40.0 - 53.0 %    MCV 92 78 - 100 fL    MCH 28.1 26.5 - 33.0 pg    MCHC 30.5 (L) 31.5 - 36.5 g/dL    RDW 18.2 (H) 10.0 - 15.0 %    Platelet Count 364 150 - 450 10e3/uL    % Neutrophils 76 %    % Lymphocytes 15 %    % Monocytes 8 %    % Eosinophils 0 %    % Basophils 0 %    % Immature Granulocytes 1 %    NRBCs per 100 WBC 0 <1 /100    Absolute Neutrophils 8.3 1.6 - 8.3 10e3/uL    Absolute Lymphocytes 1.7 0.8 - 5.3 10e3/uL    Absolute Monocytes 0.8 0.0 - 1.3 10e3/uL    Absolute Eosinophils 0.0 0.0 - 0.7 10e3/uL    Absolute Basophils 0.0 0.0 - 0.2 10e3/uL    Absolute Immature Granulocytes 0.1 <=0.4 10e3/uL    Absolute NRBCs 0.0 10e3/uL

## 2022-05-17 NOTE — PROGRESS NOTES
CLINICAL NUTRITION SERVICES - ASSESSMENT NOTE     Nutrition Prescription    RECOMMENDATIONS FOR MDs/PROVIDERS TO ORDER:  1. Liberalize diet order to a 3 g sodium diet if inadequate nutrition intake this admission.   2. Biotene prn.  3. Check phos. Monitor lytes (Phos, Mg++, and K+) for refeeding syndrome. If lytes trend low, aggressively replace.      Malnutrition Status:    Severe malnutrition in the context of chronic illness    Recommendations already ordered by Registered Dietitian (RD):  Oral supplements  Multivitamin with minerals    Future/Additional Recommendations:  1. Continue fluid restriction as per team.   2. Monitor BG. BG was 114 on 5/17.   3. Consider thiamine if warranted.   4. If TF becomes nutrition plan of care, would rec initiate Osmolite 1.5 (concentrated, maintenance TF formula) and order Prosource TF modular (1 pkt TID). Initiate TFs at 15 mL/hr, advancing rate by 10 mL Q 8 hrs (or per provider discretion, pending hemodynamic stability) to goal rate of 60 mL/hr. Osmolite 1.5 Virgilio @ goal of  60ml/hr  (1440ml/day) + 3 pkts Prosource will provide: 2280 kcals (32 kcals/kg), 123 g PRO (1.7 g protein/kg), 1097 ml free H20, 293 g CHO, and 0 g fiber daily.         If begin tube feeds:    - Flush FT with 30 mL water Q 4 hrs for patency. Rec provider adjust free water flushes as needed, pending fluid status.   - Ensure K+/Mg++/Phos labs are ordered daily until TFs advance to goal infusion to evaluate for sx of refeeding with nutrition received. If lytes trend low, aggressively replace lytes. Do not adv TF greater than 30 mL/hr unless K+ >3, Mg++ > 1.5, and Phos > 1.9.   - If not already ordered, order a multivitamin/mineral (certavite or liquid multivitamin/minerals 15 mL/day via FT) to help ensure micronutrient needs being met with suspected hypermetabolic demands and potential interruptions to TF infusions.   - Monitor TF and possible need to adjust nutrition support regimen if necessary, pending  "medical course and nutrition status.       - Monitor need to check a metabolic cart study.    - Send a nutrition consult for \"Registered Dietitian to Order TF per Medical Nutrition Therapy Guidelines\" if desire RD to order TFs.      REASON FOR ASSESSMENT  Dandy Sands is a/an 61 year old male assessed by the dietitian for Admission Nutrition Risk Screen for malnutrition score of two or greater and Provider Order - Nutrition Education - \"Congestive Heart Failure (CHF) - Dietitian to instruct patient on 2 gram sodium diet.\"    NUTRITION/ADDITIONAL HISTORY  Pt not known to this service PTA.   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 Lives alone with his cat (Rubens) in [place], [place]. Lives in a house.\" Pt was ordered to take, noting, pepcid, iron, lasix, ativan, and potassium PTA.   Per discussion with pt and daughter who was at bedside, he has traditionally had a good appetite and ate heartily. Likes protein options. In January, he developed a lack of appetite started when he contracted COVID-19 and had some taste changes (which improved some over time). He is eating less than 50% of his usual oral intake, especially over the last two and a half months. Eating less due to lack of appetite and changes he has made with following a low-sodium diet (has been following for ~2.5 months and is now eating differently). States limiting his sodium is easy but limiting his fluid is difficult. Has a dry mouth and likes to drink water. His daughter purchased chocolate oral supplements for him.      CURRENT NUTRITION ORDERS  Diet: 2 g Sodium and 2000 mL Fluid " "Restriction. Was previously on a cardiac diet order 5/16.  Intake/Tolerance: Tolerating diet. Per nursing flowsheets (% intake), pt consuming 75% on 5/16. Per RN 5/17, Stated he had one emesis and felt better after. Stated he thinks he felt bad from the pizza he ate yesterday. At bedside, he has a cup which holds ~600 mL fluid.     LABS  Labs reviewed    MEDICATIONS  Medications reviewed    ANTHROPOMETRICS  Height: 171.5 cm (5' 7.5\")  Most Recent Weight: 71.5 kg (157 lb 11.2 oz)    IBW: 68.6 kg. At 96% of IBW.   BMI: Normal BMI. Body mass index is 24.33 kg/m .   Weight History: 81.4 kg (3/17/2014) - Per discussion with pt today (5/17/2022), usual body wt of 185# (84.1 kg) until January 2022 when he became ill with COVID-19. Pt has lost 15% of body wt over the last four months.   Dosing Weight: 72 kg (based on lowest wt so far this admission of 71.5 kg on 5/17)    ASSESSED NUTRITION NEEDS (for inpatient hospital stay)   Estimated Energy Needs: 9573-4414 kcals/day (25 - 30 kcals/kg)  Justification: Maintenance needs  Estimated Protein Needs:  grams protein/day (1.1 - 1.4 grams of pro/kg)  Justification: Increased needs with cardiac status. If/when post-op, estimated needs increase to ~1.2-2 g protein/kg ( g protein/day)  Estimated Fluid Needs: 2000 mL/day   Justification: On a fluid restriction.     PHYSICAL FINDINGS/OTHER FINDINGS  See malnutrition section below.  GI: Last stool on 5/17. Having zero to one stool daily. Stools are loose and brown.   Skin: Red coccyx per chart (no WOC consult at this time)    MALNUTRITION  % Intake: </= 50% for >/= 5 days (severe)  % Weight Loss: > 10% in less than 6 months (severe)  Subcutaneous Fat Loss: Facial region: Severe; Arms: Mild  Muscle Loss: Temporal:  Moderate, Thoracic region (clavicle, acromium bone, deltoid, trapezius, pectoral):  mild and Posterior calf:  Moderate  Fluid Accumulation/Edema: None noted  Malnutrition Diagnosis: Severe malnutrition in the " context of chronic illness    NUTRITION DIAGNOSIS  Inadequate oral intake related to decreased appetite, taste changes, and low-sodium diet order as evidenced by pt consuming less than 50% of his usual oral intake for greater than two months.       INTERVENTIONS  Implementation  Nutrition Education: Offered 2 g sodium nutrition education. Pt declined need as he received nutrition education in the past. Discussed fluid restriction. Gave tips for coping with dry mouth. Gave handout, Managing Your Fluid Intake. Provided room service sodium menu.   Medical food supplement therapy: Discussed oral supplements. Ordered chocolate Ensure Enlive at 10:00 and HS (send chocolate Glucerna or chocolate Ensure MAX if out).  Multivitamin/mineral supplement therapy: Ordered a multivitamin with minerals to help meet micronutrient needs    Goals  Patient to consume % of nutritionally adequate meal trays TID, or the equivalent with supplements/snacks.     Monitoring/Evaluation  Progress toward goals will be monitored and evaluated per protocol.       Nutrition will continue to follow.      Bridget Wade, MS, RD, LD, [facility]   6C Pgr: [phone]

## 2022-05-17 NOTE — CONSULTS
Dental Service Consultation/Clearance Letter     Dandy Sands MRN# [id number]  YOB: 1960 Age: 61 year old  Date of Admission: 5/16/2022    Reason for consult: I was asked by Dr. Can to evaluate this patient for a dental evaluation prior to a possible valve replacement surgery.    Chief Complaint: Pt reports no pain or discomfort intraorally. Pt denies any signs of oral infection and fever.     Assessment and Plan:  Assessment:   Radiographic exam: Dental CT reveals partially edentulous adult dentition. Condyles seated in fossa bilaterally, maxillary sinuses clear bilaterally. Coronal radiolucencies consistent with decay on teeth #6. No Periapical radiolucencies present radiographically. No osseous pathology seen.      Extraoral exam: NC/AT, EOMI, PERRL, No submandibular lymphadenopathy, no induration or erythema, no abnormal skin lesions, inferior border of mandible is palpable bilaterally, ARPAN ~40mm. No TMJ discomfort bilaterally. FOM soft and non tender. Bilateral clicking of TMJ on opening and lateral movements.     Intraoral exam: Reveals decayed and poor dentition. Patient asymptomatic to palpation and percussion. Mallampati II. ARPAN ~40mm.      Plan:  - Pt is cleared dentally and does not require any dental extractions prior to possible valve replacement surgery.   - Pt was recommended to follow up with his dentist when he is discharged and get a dental exam/cleaning/fillings completed.   - Pt's nurse was given an update on exam.      Clinic information:   [facility]  [address] [place], [place] [address] [place], [place] [address] [phone]    History is obtained from the patient.     History of Present Illness:  61 year old male with history of CAD s/p remote PCI to LAD, ICM LVEF 30% s/p CRT-D, severe MR, SLE and APL with hx of DVT/PE on chronic anticoagulation with warfarin, hx of COVID-19 1/2022 (was hospitalized, not intubated), HTN, and HLD who was admitted to  [facility] 5/16/2022 as a transfer from [facility] in [place] for evaluation of multivessel coronary artery disease and moderate-severe functional mitral regurgitation. Rheumatology consulted for evaluation of his current immunosuppressive regimen in setting of anticipated CABG.        Past Medical History:  No past medical history on file.    Past Surgical History:  No past surgical history on file.    Social History:  Social History     Tobacco Use     Smoking status: Not on file     Smokeless tobacco: Not on file   Substance Use Topics     Alcohol use: Not on file       Family History:  No family history on file.    Immunizations:    There is no immunization history on file for this patient.    Allergies:  No Known Allergies    Medications:  Current Facility-Administered Medications Ordered in Epic   Medication Dose Route Frequency Last Rate Last Admin     acetaminophen (TYLENOL) tablet 650 mg  650 mg Oral Q4H PRN   650 mg at 05/16/22 1825     alum & mag hydroxide-simethicone (MAALOX) suspension 30 mL  30 mL Oral Q4H PRN   30 mL at 05/17/22 0240     aspirin EC tablet 81 mg  81 mg Oral Daily   81 mg at 05/17/22 0751     [Held by provider] atorvastatin (LIPITOR) tablet 40 mg  40 mg Oral Daily   40 mg at 05/17/22 0751     enoxaparin ANTICOAGULANT (LOVENOX) injection 70 mg  1 mg/kg Subcutaneous Q12H   70 mg at 05/17/22 0755     famotidine (PEPCID) tablet 20 mg  20 mg Oral BID   20 mg at 05/17/22 0751     ferrous sulfate (FEROSUL) tablet 325 mg  325 mg Oral Daily with lunch   325 mg at 05/17/22 1159     [START ON 5/18/2022] furosemide (LASIX) injection 40 mg  40 mg Intravenous BID         HOLD nitroGLYcerin IF   Does not apply HOLD         hydroxychloroquine (PLAQUENIL) tablet 200 mg  200 mg Oral BID   200 mg at 05/17/22 0752     hydrOXYzine (ATARAX) tablet 25 mg  25 mg Oral Q6H PRN   25 mg at 05/16/22 1824    Or     hydrOXYzine (ATARAX) tablet 50 mg  50 mg Oral Q6H PRN   50 mg at 05/17/22 1458     LORazepam  (ATIVAN) tablet 0.5-1 mg  0.5-1 mg Oral Q6H PRN   0.5 mg at 05/17/22 1439     LORazepam (ATIVAN) tablet 2 mg  2 mg Oral Once PRN         melatonin tablet 5 mg  5 mg Oral At Bedtime         metoprolol succinate ER (TOPROL-XL) 24 hr half-tab 12.5 mg  12.5 mg Oral QPM   12.5 mg at 05/16/22 2000     multivitamin w/minerals (THERA-VIT-M) tablet 1 tablet  1 tablet Oral Daily with lunch   1 tablet at 05/17/22 1436     mycophenolate (GENERIC EQUIVALENT) capsule 1,500 mg  1,500 mg Oral BID   1,500 mg at 05/17/22 0751     Patient is already receiving anticoagulation with heparin, enoxaparin (LOVENOX), warfarin (COUMADIN)  or other anticoagulant medication   Does not apply Continuous PRN         Reason ACE/ARB/ARNI order not selected   Other DOES NOT GO TO MAR         Reason beta blocker not prescribed   Does not apply DOES NOT GO TO MAR         tamsulosin (FLOMAX) capsule 0.4 mg  0.4 mg Oral Daily   0.4 mg at 05/17/22 0752     zolpidem (AMBIEN) tablet 5 mg  5 mg Oral At Bedtime PRN         No current [facility]-ordered outpatient medications on file.       Review of Systems:  The 10 point Review of Systems is negative other than noted in the HPI    Physical Exam:  Vitals were reviewed  Temp: 97.7  F (36.5  C) Temp src: Axillary BP: 99/81 Pulse: 87   Resp: 16 SpO2: 95 %          Head and neck exam:  HEENT: NC/AT, EOMI, PERRL, No submandibular lymphadenopathy, no induration or erythema, no abnormal skin lesions, inferior border of mandible is palpable bilaterally, ARPAN ~40mm. No TMJ discomfort bilaterally, although pt has bilateral clicking and popping upon opening on TMJ. FOM soft and non tender.     Data:  Radiographic interpretation: Dental CT taken on 5/17/22.   Osseous pathology: None noted   Pulpal Pathology: None noted   Periodontal Pathology: None noted   Caries: Noted on tooth #6 distal (upper right canine)   Odontogenic pathology: None noted       The patient was discussed with: Dr. Charles.     Quincy Lawrence, TOM    Dental PGY1  Pager: [phone]

## 2022-05-17 NOTE — CONSULTS
Health Psychology                                                                                                                          Shante Anne, Ph.D., L.P [phone]  Rocio Conner, Ph.D., L.P. [phone]  Faye Myers, Ph.D, L..P. [phone]  Chantal Monsalve, Ph.D., L.P. [phone]  Delbert Helton, Ph.D., A.B.P.P., L.P. [phone]         Symone Zeng, Ph.D., L.P. [phone]       Radha Torres, Ph.D., A.B.P.P., LP [phone]           [facility], 3rd Floor  [address] [place] [address]    Inpatient Health Psychology Consultation    Date of Service:  5/17/22    Health Psychology consult received for anxiety management. Chart reviewed, no mental health history documented. Will meet with patient tomorrow, 5/18.    Faye Myers, PhD,   Clinical Health Psychologist  Phone: [phone]  Pager: [phone]

## 2022-05-17 NOTE — TELEPHONE ENCOUNTER
----- Message from YAO Gar CNP sent at 5/17/2022  3:27 PM CDT -----  Regarding: RE: Inpatient Cardiac MRI  Reviewed and OK to proceed as long as device check is good prior.   ----- Message -----  From: Arely Connor  Sent: 5/17/2022   3:07 PM CDT  To: YAO Gar CNP, #  Subject: Inpatient Cardiac MRI                            Patient needs cardiac MRI. His device is non-conditional. Can we have this approved for a noncon protocol

## 2022-05-17 NOTE — TELEPHONE ENCOUNTER
Request received for patient to have a MRI. Patient has a MRI non-conditional [employer] Evera ICD with abandoned LV lead. MARIA ESTHER Mckay has approved the MRI utilizing the Magnetic resonance nonconditional cardiac Implantable electronic device (CIED) Protocol. MRI to be scheduled.

## 2022-05-17 NOTE — PLAN OF CARE
D/I/A: Pt here w/ 3v disease in need of CABG and MVR. Tests ongoing. CXR, dental CT and Echo completed. Pt continued to complain of anxiety. Tried atarax 50mg, this was ineffective. Next tried 0.5mg ativan. This provided relief and MD changed order to q6hr PRN. MRI checklist sent to MRI department along with copy of patient's ICD card. SR, VSS, RA, independent.  P: Continue to monitor. Cardiac MRI on Wednesday.

## 2022-05-17 NOTE — PROGRESS NOTES
[facility]    Cardiology Progress Note- Cards 2      Date of Admission:  5/16/2022     Assessment & Plan: HVSL   Dandy EDUARD Sands is a 61 year old male with history of CAD s/p remote PCI to LAD, ICM LVEF 30% s/p CRT-D, severe MR, APL with hx of DVT/PE on chronic anticoagulation with warfarin, hx of COVID-19 1/2022 (was hospitalized, not intubated), HTN, and HLD who was admitted to [facility] 5/16/2022 as a transfer from [facility] in [place] for evaluation of multivessel coronary artery disease and moderate-severe functional mitral regurgitation.    Today:   - Increase PRN ativan  - Increase diuresis, goal net negative 1 L  - Health psych consult  - Will need PRN for MRI tomorrow: ordered one time dose 2mg ativan     # ICM (5/9/2022 LVEF 30-35%)  # CAD s/p PCI to LAD (2005)  # Severe ostial LAD disease with in-stent restenosis of mid LAD at diag bifurcation, severe proximal RCA disease, mild circ disease  # Hx of MI (2007  # S/p CRT-D ([employer] 2006, gen change 2012)  # Severe MR  Transferred from [facility] in [place] 5/16/2022 for evaluation of CABG + MVR at a center with ECMO/VAD as a back up. CVTS involved.   - CVTS consulted, appreciate recs              - TTE to reassess LVEF and MR now that he is euvolemic: MR is severe per TTE parameters              - Cardiac MRI to assess viability (scheduled 5/18 12:30)              - Vein mapping              - Bilateral carotid duplex US              - CT chest w/o contrast: mild/mod emphesema              - Rheumatology consult to assess whether he can hold or reduce doses of immunosuppression              - Dental consult for pre-op clearance prior to possible valve replacement; CT with suspected detal carries, no periapical abscesses              - Plan for CABG, possible MVR this admission pending workup  - ACEI/ARB/ARNI- no  - BB- continue pta metoprolol succinate 12.5mg qhs  - Aldosterone receptor  antagonist- no  - SGLT2i- no  - Antiplatelet- continue pta aspirin 81mg daily, holding pta Plavix 75 mg daily  - Statin- continue pta atorvastatin 40mg daily  - Volume status: hypervolemic  - Diuretic- 80mg IV lasix once, target net negative 1L or more  - OT consult  - Nutrition consult  - Strict I&Os  - Daily weights  - Telemetry    # Liver injury, mixed hepatocellular & cholestatic, acute  Likely in setting of hepatic congestion. Diuresis as above.   - Follow CMP q12h     # SLE  # APL  # Hx of DVT and PE  PTA on warfarin (he took 6.5mg daily). Transitioned to Lovenox at OSH in anticipation of surgery. He reports that his main symptom of lupus is diffuse joint pains that is tolerable with Tylenol.   - Rheumatology consulted, as above   - Lovenox 70mg q12h  - Continue pta hydroxychloroquine 200mg bid   - Continue pta mycophenolic acid 1500mg q12h   - Tylenol prn for pain     # Anxiety  # Insomnia  Continues to report severe anxiety.  - PTA lorazepam 0.5-1 mg q6h PRN  - Hydroxyzine prn for anxiety  - Melatonin scheduled  - Zolpidem PRN  - Psych consult  _________________  Chronic/stable:     #Anemia, chronic  - Continue pta ferrous sulfate 325mg daily      #GERD  - Continue pta famotidine 20mg bid     #BPH  - Continue pta tamsulosin 0.4mg daily     #Hx of COVID-19  Had COVID ~1/2022, was hospitalized for ~6 days, was on BiPap for a little, was not intubated.       Diet:   2g Na diet  DVT Prophylaxis: Lovenox  Fitzgerald Catheter: Not present  Code Status: Full      Disposition Plan   Expected discharge: 4 - 7 days, recommended to TBD once euvolemic, MR/CAD managed or plan established.    Entered: Cindy Hall MD 05/17/2022, 7:13 AM       The patient's care was discussed with the Attending Physician, Dr. Lora.    Cindy Hall MD  [facility]    ______________________________________________________________________    Interval History   Patient had a rough  night. Reports severe anxiety, worsening dyspnea and orthopnea. Feels like he can't calm down. He can't eat or sleep. Reports getting sleeping pills at the other hospital that helped. His cat usually helps with his anxiety. He reports no change in his urine output. Has chills and chest discomfort when he takes deep breaths but denies abdominal pain, light-headedness. His anxiety has not acutely changed, has been this way over the past several weeks.     Data reviewed today: I reviewed all medications, new labs and imaging results over the last 24 hours.     Physical Exam   Vital Signs: Temp: 97.4  F (36.3  C) Temp src: Oral BP: 99/73 Pulse: 94   Resp: 16 SpO2: 98 %      Weight: 158 lbs 1.6 oz  General: sitting up in chair, leaning over his bed, in moderate distress  HEENT: no scleral icterus or conjunctival injection, oropharynx clear  Resp: slight basilar rales in left base; otherwise clear to auscultation  Cardiac: regular rate and rhythm, 3/6 systolic murmur at apex. JVD to mid neck when sitting at 90 degrees  Abdomen: soft, non-tender, non-distended. No rebound or guarding.  Extremities: warm, no lower extremity edema  MSK: some reduced muscle bulk  Neuro: alert, oriented x4. No focal neuro deficits.  Psych: appropriate mood and affect     Data   Recent Labs   Lab 05/17/22  1147 05/17/22  0556 05/16/22  1442   WBC  --  10.9 12.6*   HGB  --  8.5* 8.8*   MCV  --  92 92   PLT  --  364 344   INR  --  1.30* 1.23*   NA  --  138 135   POTASSIUM 3.6 3.9 4.7   CHLORIDE  --  106 103   CO2  --  21 23   BUN  --  29 26   CR  --  0.97 0.96   ANIONGAP  --  11 9   ELDA  --  9.3 9.6   GLC  --  114* 131*   ALBUMIN  --  3.2* 3.5  3.5   PROTTOTAL  --  6.9 7.4  7.4   BILITOTAL  --  0.4 0.4  0.4   ALKPHOS  --  88 90  90   ALT  --  137* 56  56   AST  --  104* 44  44     Recent Results (from the past 24 hour(s))   CT Chest w/o Contrast    Narrative    CT chest without contrast    INDICATION: Preoperative CABG    COMPARISON:  None    FINDINGS: The included thyroid appears normal. Implantable cardiac  defibrillator from left subclavian transvenous approach. Multivessel  coronary artery calcium. Calcifications in the left ventricle wall.  Left atrial enlargement. No pleural or pericardial effusion. IVC  filter. Abdominal aortic calcifications. No adenopathy in the chest.  Bullous disease in the lung apices with mild to moderate emphysematous  changes in the lungs. No dominant pulmonary nodule. Bony structures  appear grossly unremarkable. There are degenerative changes in the  thoracic spine and lower cervical spine.      Impression    IMPRESSION: Implantable cardiac defibrillator. Coronary artery  calcium. Aortic calcifications. Left atrial mild enlargement. Mild to  moderate emphysema. Calcification in the left ventricle wall.    CONSTANTINO OWEN MD         SYSTEM ID:  [id number]   XR Chest 2 Views    Narrative    Exam: XR CHEST 2 VW, 5/16/2022 6:17 PM    Indication: pre-op cabg    Comparison: Same-day CT    Findings:   Left chest wall implantable cardiac defibrillator leads project over  the right ventricular apex, right atrial appendage, and coronary  sinus. Enlarged cardiac silhouette. Coronary artery stents. The  pulmonary vasculature is within normal limits. Bilateral peripheral  reticular opacities and emphysematous changes are better seen on  comparison CT. No pleural effusion or pneumothorax. Partially  visualized IVC filter.      Impression    Impression: No acute cardiopulmonary abnormality. Please refer to same  day chest CT for further details.    YESENIA KEITH DO         SYSTEM ID:  [id number]   XR Chest Port 1 View    Narrative    EXAM: XR CHEST PORT 1 VIEW  5/17/2022 8:32 AM     HISTORY:  heart failure       COMPARISON:  CT chest and Chest radiograph 5/16/2022    FINDINGS:     Portable upright view of the chest. Left chest wall cardiac pacemaker  with leads in stable position. Stable cardiomegaly. Unchanged upper  lobe  predominant reticular opacities and bibasilar opacities. No acute  airspace disease.    No acute osseous abnormality. Visualized upper abdomen is  unremarkable.        Impression    IMPRESSION:   1. No acute airspace disease. Interstitial/reticular changes again  noted.  2. Cardiomegaly.    I have personally reviewed the examination and initial interpretation  and I agree with the findings.    GABRIEL COURTNEY MD         SYSTEM ID:  [id number]   Echo Complete   Result Value    LVEF  10-15% (severely reduced)    Narrative    [id number]  [id number]  [id number]  [id number]^PILY^MARINA     [facility],[place]  Echocardiography Laboratory  [address] [place] [id number]     Name: OLEG KAUR  MRN: [id number]  : 1960  Study Date: 2022 10:12 AM  Age: 61 yrs  Gender: Male  [place] [facility] [place]  Reason For Study: Mitral Regurgitation  Ordering Physician: MARINA NASCIMENTO  Performed By: Nacho Alcazar RDCS     BSA: 1.8 m2  Height: 67 in  Weight: 147 lb  BP: 96/72 mmHg  ______________________________________________________________________________  Procedure  Echocardiogram with two-dimensional, color and spectral Doppler performed.  Contrast Optison. Patient was given 9 ml mixture of 3 ml Optison and 6 ml  saline.  ______________________________________________________________________________  Interpretation Summary  Severe left ventricular dilation is present. Left ventricular function is  decreased. The ejection fraction is 10-15% (severely reduced). Severe diffuse  hypokinesis is present.  Mild right ventricular dilation is present. Global right ventricular function  is mildly reduced.  Severe functional mitral insufficiency is present.  Pulmonary hypertension is present. Estimated pulmonary artery systolic  pressure is 44 mmHg plus right atrial pressure.  Dilation of the inferior vena cava is present with abnormal respiratory  variation in diameter. Mean RA pressure estiamted at  15 mmHg.  No pericardial effusion is present.  ______________________________________________________________________________  Left Ventricle  Severe left ventricular dilation is present. Left ventricular function is  decreased. The ejection fraction is 10-15% (severely reduced). Left  ventricular diastolic function is abnormal. Severe diffuse hypokinesis is  present. There is no thrombus.     Right Ventricle  Mild right ventricular dilation is present. Global right ventricular function  is mildly reduced. A pacemaker lead is noted in the right ventricle.     Atria  The right atria appears normal. Severe left atrial enlargement is present.     Mitral Valve  Severe mitral insufficiency is present. The cause of the mitral insufficiency  appears to be altered left ventricular size and geometry. Several features of  severe MR present - E velocity 1.6 m/s, systolic flow reversal in the LLPV,  EROA = 0.57 cm2, MR volume 58 mL.     Aortic Valve  The aortic valve is tricuspid. Trace aortic insufficiency is present.     Tricuspid Valve  Mild tricuspid insufficiency is present. Estimated pulmonary artery systolic  pressure is 44 mmHg plus right atrial pressure. Pulmonary hypertension is  present.     Pulmonic Valve  Mild pulmonic insufficiency is present.     Vessels  The aorta root is normal. Dilation of the inferior vena cava is present with  abnormal respiratory variation in diameter. IVC diameter >2.1 cm collapsing  <50% with sniff suggests a high RA pressure estimated at 15 mmHg or greater.     Pericardium  No pericardial effusion is present.     Compared to Previous Study  There is no prior study for direct comparison.  ______________________________________________________________________________  MMode/2D Measurements & Calculations  IVSd: 0.72 cm  LVIDd: 6.1 cm  LVIDs: 5.4 cm  LVPWd: 1.0 cm  FS: 10.9 %  LV mass(C)d: 211.9 grams  LV mass(C)dI: 119.5 grams/m2  LA dimension: 5.4 cm  asc Aorta Diam: 3.1 cm  LVOT diam:  2.1 cm  LVOT area: 3.5 cm2  RWT: 0.34     Doppler Measurements & Calculations  MV E max abhilash: 149.7 cm/sec  MV dec slope: 1120 cm/sec2  MV dec time: 0.14 sec  Ao V2 max: 101.0 cm/sec  Ao max P.0 mmHg  Ao V2 mean: 76.0 cm/sec  Ao mean PG: 3.0 mmHg  Ao V2 VTI: 16.2 cm  OCTAVIA(I,D): 3.1 cm2  OCTAVIA(V,D): 2.4 cm2  LV V1 max P.0 mmHg  LV V1 max: 70.9 cm/sec  LV V1 VTI: 14.4 cm  SV(LVOT): 49.9 ml  SI(LVOT): 28.1 ml/m2     PA V2 max: 73.3 cm/sec  PA max P.1 mmHg  PI end-d abhilash: 149.0 cm/sec  TR max abhilash: 330.0 cm/sec  TR max P.6 mmHg  AV Abhilash Ratio (DI): 0.70  OCTAVIA Index (cm2/m2): 1.7  E/E' av.1  Lateral E/e': 16.9  Medial E/e': 45.2     ______________________________________________________________________________  Report approved by: Nakia Gaines 2022 11:46 AM         CT Dental wo Contrast    Narrative    CT DENTAL WO CONTRAST 2022 11:38 AM    History:  IP Consult for pre-LVAD dental evaluation    Comparison:  None      TECHNIQUE: 3-D reconstruction by the technologists, with curved  multiplanar reformat of thin section imaging through the mandible and  maxilla obtained without intravenous contrast.    FINDINGS:  No significant soft tissue swelling or mass. Normal facial bone  alignment. No bony erosion.     Visualized portions of the paranasal sinuses demonstrate polypoid  mucosal thickening of the left maxillary sinus.. Minimal to mild  mucosal thickening in the ethmoid air cells, frontal sinus, and right  maxillary sinus. Normal temporomandibular joints. Nasal septal  perforation.      Impression    IMPRESSION:   1. Suspect dental caries along the distal surface of the right  mandibular canine, although this could be artifactual. No periapical  abscess.  2. Paranasal sinus mucosal thickening without air-fluid levels to  suggest acute sinusitis.    I have personally reviewed the examination and initial interpretation  and I agree with the findings.    SHAUN OCHOA MD         SYSTEM ID:   [id number]

## 2022-05-18 ENCOUNTER — APPOINTMENT (OUTPATIENT)
Dept: MRI IMAGING | Facility: CLINIC | Age: 62
DRG: 246 | End: 2022-05-18
Attending: INTERNAL MEDICINE
Payer: COMMERCIAL

## 2022-05-18 ENCOUNTER — ANCILLARY PROCEDURE (OUTPATIENT)
Dept: CARDIOLOGY | Facility: CLINIC | Age: 62
DRG: 246 | End: 2022-05-18
Attending: INTERNAL MEDICINE
Payer: COMMERCIAL

## 2022-05-18 ENCOUNTER — APPOINTMENT (OUTPATIENT)
Dept: GENERAL RADIOLOGY | Facility: CLINIC | Age: 62
DRG: 246 | End: 2022-05-18
Attending: STUDENT IN AN ORGANIZED HEALTH CARE EDUCATION/TRAINING PROGRAM
Payer: COMMERCIAL

## 2022-05-18 DIAGNOSIS — Z95.810 ICD (IMPLANTABLE CARDIOVERTER-DEFIBRILLATOR) IN PLACE: ICD-10-CM

## 2022-05-18 DIAGNOSIS — I50.9 DECOMPENSATED HEART FAILURE (H): ICD-10-CM

## 2022-05-18 DIAGNOSIS — I50.9 DECOMPENSATED HEART FAILURE (H): Primary | ICD-10-CM

## 2022-05-18 LAB
ALBUMIN SERPL-MCNC: 3.3 G/DL (ref 3.4–5)
ALBUMIN SERPL-MCNC: 3.5 G/DL (ref 3.4–5)
ALP SERPL-CCNC: 78 U/L (ref 40–150)
ALP SERPL-CCNC: 86 U/L (ref 40–150)
ALT SERPL W P-5'-P-CCNC: 158 U/L (ref 0–70)
ALT SERPL W P-5'-P-CCNC: 185 U/L (ref 0–70)
ANION GAP SERPL CALCULATED.3IONS-SCNC: 12 MMOL/L (ref 3–14)
ANION GAP SERPL CALCULATED.3IONS-SCNC: 9 MMOL/L (ref 3–14)
AST SERPL W P-5'-P-CCNC: 74 U/L (ref 0–45)
AST SERPL W P-5'-P-CCNC: 85 U/L (ref 0–45)
BASE EXCESS BLDV CALC-SCNC: -1.4 MMOL/L (ref -7.7–1.9)
BASE EXCESS BLDV CALC-SCNC: 0.4 MMOL/L (ref -7.7–1.9)
BASOPHILS # BLD AUTO: 0 10E3/UL (ref 0–0.2)
BASOPHILS NFR BLD AUTO: 0 %
BILIRUB SERPL-MCNC: 0.6 MG/DL (ref 0.2–1.3)
BILIRUB SERPL-MCNC: 0.7 MG/DL (ref 0.2–1.3)
BUN SERPL-MCNC: 24 MG/DL (ref 7–30)
BUN SERPL-MCNC: 28 MG/DL (ref 7–30)
CALCIUM SERPL-MCNC: 8.8 MG/DL (ref 8.5–10.1)
CALCIUM SERPL-MCNC: 9.2 MG/DL (ref 8.5–10.1)
CHLORIDE BLD-SCNC: 104 MMOL/L (ref 94–109)
CHLORIDE BLD-SCNC: 104 MMOL/L (ref 94–109)
CO2 SERPL-SCNC: 22 MMOL/L (ref 20–32)
CO2 SERPL-SCNC: 23 MMOL/L (ref 20–32)
CREAT SERPL-MCNC: 1.06 MG/DL (ref 0.66–1.25)
CREAT SERPL-MCNC: 1.2 MG/DL (ref 0.66–1.25)
CREAT UR-MCNC: 139 MG/DL
EOSINOPHIL # BLD AUTO: 0 10E3/UL (ref 0–0.7)
EOSINOPHIL NFR BLD AUTO: 0 %
ERYTHROCYTE [DISTWIDTH] IN BLOOD BY AUTOMATED COUNT: 18.3 % (ref 10–15)
GFR SERPL CREATININE-BSD FRML MDRD: 69 ML/MIN/1.73M2
GFR SERPL CREATININE-BSD FRML MDRD: 80 ML/MIN/1.73M2
GLUCOSE BLD-MCNC: 124 MG/DL (ref 70–99)
GLUCOSE BLD-MCNC: 98 MG/DL (ref 70–99)
GLUCOSE BLDC GLUCOMTR-MCNC: 104 MG/DL (ref 70–99)
GLUCOSE BLDC GLUCOMTR-MCNC: 98 MG/DL (ref 70–99)
HCO3 BLDV-SCNC: 23 MMOL/L (ref 21–28)
HCO3 BLDV-SCNC: 25 MMOL/L (ref 21–28)
HCT VFR BLD AUTO: 28.2 % (ref 40–53)
HGB BLD-MCNC: 6.9 G/DL (ref 13.3–17.7)
HGB BLD-MCNC: 8.4 G/DL (ref 13.3–17.7)
HGB BLD-MCNC: 8.7 G/DL (ref 13.3–17.7)
HGB BLD-MCNC: 8.9 G/DL (ref 13.3–17.7)
HOLD SPECIMEN: NORMAL
IMM GRANULOCYTES # BLD: 0.1 10E3/UL
IMM GRANULOCYTES NFR BLD: 1 %
INR PPP: 1.36 (ref 0.85–1.15)
LYMPHOCYTES # BLD AUTO: 1.1 10E3/UL (ref 0.8–5.3)
LYMPHOCYTES NFR BLD AUTO: 11 %
MAGNESIUM SERPL-MCNC: 2.2 MG/DL (ref 1.6–2.3)
MCH RBC QN AUTO: 28.3 PG (ref 26.5–33)
MCHC RBC AUTO-ENTMCNC: 30.9 G/DL (ref 31.5–36.5)
MCV RBC AUTO: 92 FL (ref 78–100)
MONOCYTES # BLD AUTO: 0.8 10E3/UL (ref 0–1.3)
MONOCYTES NFR BLD AUTO: 8 %
NEUTROPHILS # BLD AUTO: 7.8 10E3/UL (ref 1.6–8.3)
NEUTROPHILS NFR BLD AUTO: 80 %
NRBC # BLD AUTO: 0 10E3/UL
NRBC BLD AUTO-RTO: 0 /100
O2/TOTAL GAS SETTING VFR VENT: 21 %
O2/TOTAL GAS SETTING VFR VENT: 3 %
OXYHGB MFR BLDV: 33 % (ref 92–100)
OXYHGB MFR BLDV: 40 % (ref 70–75)
OXYHGB MFR BLDV: 55 % (ref 70–75)
OXYHGB MFR BLDV: 55 % (ref 92–100)
OXYHGB MFR BLDV: 94 % (ref 92–100)
PCO2 BLDV: 34 MM HG (ref 40–50)
PCO2 BLDV: 37 MM HG (ref 40–50)
PH BLDV: 7.43 [PH] (ref 7.32–7.43)
PH BLDV: 7.43 [PH] (ref 7.32–7.43)
PHOSPHATE SERPL-MCNC: 3.4 MG/DL (ref 2.5–4.5)
PLATELET # BLD AUTO: 358 10E3/UL (ref 150–450)
PLPETH BLD-MCNC: <10 NG/ML
PO2 BLDV: 27 MM HG (ref 25–47)
PO2 BLDV: 34 MM HG (ref 25–47)
POPETH BLD-MCNC: <10 NG/ML
POTASSIUM BLD-SCNC: 3.4 MMOL/L (ref 3.4–5.3)
POTASSIUM BLD-SCNC: 3.6 MMOL/L (ref 3.4–5.3)
PROT SERPL-MCNC: 7 G/DL (ref 6.8–8.8)
PROT SERPL-MCNC: 7.5 G/DL (ref 6.8–8.8)
PROT UR-MCNC: 0.31 G/L
PROT/CREAT 24H UR: 0.22 G/G CR (ref 0–0.2)
RBC # BLD AUTO: 3.07 10E6/UL (ref 4.4–5.9)
SODIUM SERPL-SCNC: 136 MMOL/L (ref 133–144)
SODIUM SERPL-SCNC: 138 MMOL/L (ref 133–144)
WBC # BLD AUTO: 9.7 10E3/UL (ref 4–11)

## 2022-05-18 PROCEDURE — 200N000002 HC R&B ICU UMMC

## 2022-05-18 PROCEDURE — 82810 BLOOD GASES O2 SAT ONLY: CPT

## 2022-05-18 PROCEDURE — 250N000009 HC RX 250: Performed by: STUDENT IN AN ORGANIZED HEALTH CARE EDUCATION/TRAINING PROGRAM

## 2022-05-18 PROCEDURE — 255N000002 HC RX 255 OP 636: Performed by: INTERNAL MEDICINE

## 2022-05-18 PROCEDURE — 93287 PERI-PX DEVICE EVAL & PRGR: CPT

## 2022-05-18 PROCEDURE — 02HQ32Z INSERTION OF MONITORING DEVICE INTO RIGHT PULMONARY ARTERY, PERCUTANEOUS APPROACH: ICD-10-PCS | Performed by: INTERNAL MEDICINE

## 2022-05-18 PROCEDURE — 4A023N6 MEASUREMENT OF CARDIAC SAMPLING AND PRESSURE, RIGHT HEART, PERCUTANEOUS APPROACH: ICD-10-PCS | Performed by: INTERNAL MEDICINE

## 2022-05-18 PROCEDURE — 4A1239Z MONITORING OF CARDIAC OUTPUT, PERCUTANEOUS APPROACH: ICD-10-PCS | Performed by: INTERNAL MEDICINE

## 2022-05-18 PROCEDURE — 84450 TRANSFERASE (AST) (SGOT): CPT | Performed by: STUDENT IN AN ORGANIZED HEALTH CARE EDUCATION/TRAINING PROGRAM

## 2022-05-18 PROCEDURE — 93287 PERI-PX DEVICE EVAL & PRGR: CPT | Mod: 26 | Performed by: INTERNAL MEDICINE

## 2022-05-18 PROCEDURE — 93456 R HRT CORONARY ARTERY ANGIO: CPT | Performed by: INTERNAL MEDICINE

## 2022-05-18 PROCEDURE — 4A133B3 MONITORING OF ARTERIAL PRESSURE, PULMONARY, PERCUTANEOUS APPROACH: ICD-10-PCS | Performed by: INTERNAL MEDICINE

## 2022-05-18 PROCEDURE — C1894 INTRO/SHEATH, NON-LASER: HCPCS | Performed by: INTERNAL MEDICINE

## 2022-05-18 PROCEDURE — 93010 ELECTROCARDIOGRAM REPORT: CPT | Performed by: INTERNAL MEDICINE

## 2022-05-18 PROCEDURE — 83735 ASSAY OF MAGNESIUM: CPT

## 2022-05-18 PROCEDURE — 93451 RIGHT HEART CATH: CPT | Mod: 26 | Performed by: INTERNAL MEDICINE

## 2022-05-18 PROCEDURE — 250N000011 HC RX IP 250 OP 636

## 2022-05-18 PROCEDURE — 75561 CARDIAC MRI FOR MORPH W/DYE: CPT | Mod: 26 | Performed by: INTERNAL MEDICINE

## 2022-05-18 PROCEDURE — 250N000013 HC RX MED GY IP 250 OP 250 PS 637

## 2022-05-18 PROCEDURE — 82805 BLOOD GASES W/O2 SATURATION: CPT | Performed by: STUDENT IN AN ORGANIZED HEALTH CARE EDUCATION/TRAINING PROGRAM

## 2022-05-18 PROCEDURE — 80069 RENAL FUNCTION PANEL: CPT | Performed by: STUDENT IN AN ORGANIZED HEALTH CARE EDUCATION/TRAINING PROGRAM

## 2022-05-18 PROCEDURE — 250N000011 HC RX IP 250 OP 636: Performed by: STUDENT IN AN ORGANIZED HEALTH CARE EDUCATION/TRAINING PROGRAM

## 2022-05-18 PROCEDURE — 75561 CARDIAC MRI FOR MORPH W/DYE: CPT

## 2022-05-18 PROCEDURE — 85610 PROTHROMBIN TIME: CPT

## 2022-05-18 PROCEDURE — 93451 RIGHT HEART CATH: CPT | Performed by: INTERNAL MEDICINE

## 2022-05-18 PROCEDURE — 85018 HEMOGLOBIN: CPT

## 2022-05-18 PROCEDURE — 250N000013 HC RX MED GY IP 250 OP 250 PS 637: Performed by: STUDENT IN AN ORGANIZED HEALTH CARE EDUCATION/TRAINING PROGRAM

## 2022-05-18 PROCEDURE — 85025 COMPLETE CBC W/AUTO DIFF WBC: CPT

## 2022-05-18 PROCEDURE — 258N000003 HC RX IP 258 OP 636: Performed by: INTERNAL MEDICINE

## 2022-05-18 PROCEDURE — 93463 DRUG ADMIN & HEMODYNMIC MEAS: CPT | Performed by: INTERNAL MEDICINE

## 2022-05-18 PROCEDURE — 258N000003 HC RX IP 258 OP 636: Performed by: STUDENT IN AN ORGANIZED HEALTH CARE EDUCATION/TRAINING PROGRAM

## 2022-05-18 PROCEDURE — 84155 ASSAY OF PROTEIN SERUM: CPT | Performed by: STUDENT IN AN ORGANIZED HEALTH CARE EDUCATION/TRAINING PROGRAM

## 2022-05-18 PROCEDURE — 80053 COMPREHEN METABOLIC PANEL: CPT | Performed by: STUDENT IN AN ORGANIZED HEALTH CARE EDUCATION/TRAINING PROGRAM

## 2022-05-18 PROCEDURE — 999N000065 XR CHEST PORT 1 VIEW

## 2022-05-18 PROCEDURE — 90791 PSYCH DIAGNOSTIC EVALUATION: CPT | Performed by: PSYCHOLOGIST

## 2022-05-18 PROCEDURE — 71045 X-RAY EXAM CHEST 1 VIEW: CPT | Mod: 26 | Performed by: RADIOLOGY

## 2022-05-18 PROCEDURE — A9585 GADOBUTROL INJECTION: HCPCS | Performed by: INTERNAL MEDICINE

## 2022-05-18 PROCEDURE — 93005 ELECTROCARDIOGRAM TRACING: CPT

## 2022-05-18 PROCEDURE — 84100 ASSAY OF PHOSPHORUS: CPT

## 2022-05-18 PROCEDURE — 36415 COLL VENOUS BLD VENIPUNCTURE: CPT

## 2022-05-18 PROCEDURE — 250N000009 HC RX 250: Performed by: INTERNAL MEDICINE

## 2022-05-18 PROCEDURE — 99233 SBSQ HOSP IP/OBS HIGH 50: CPT | Mod: 25 | Performed by: INTERNAL MEDICINE

## 2022-05-18 PROCEDURE — 250N000011 HC RX IP 250 OP 636: Performed by: INTERNAL MEDICINE

## 2022-05-18 PROCEDURE — 272N000001 HC OR GENERAL SUPPLY STERILE: Performed by: INTERNAL MEDICINE

## 2022-05-18 RX ORDER — POTASSIUM CHLORIDE 750 MG/1
20 TABLET, EXTENDED RELEASE ORAL 2 TIMES DAILY
Status: DISCONTINUED | OUTPATIENT
Start: 2022-05-19 | End: 2022-05-26 | Stop reason: HOSPADM

## 2022-05-18 RX ORDER — POTASSIUM CHLORIDE 750 MG/1
20 TABLET, EXTENDED RELEASE ORAL ONCE
Status: DISCONTINUED | OUTPATIENT
Start: 2022-05-18 | End: 2022-05-18

## 2022-05-18 RX ORDER — FUROSEMIDE 10 MG/ML
80 INJECTION INTRAMUSCULAR; INTRAVENOUS 2 TIMES DAILY
Status: DISCONTINUED | OUTPATIENT
Start: 2022-05-19 | End: 2022-05-18

## 2022-05-18 RX ORDER — POTASSIUM CHLORIDE 750 MG/1
40 TABLET, EXTENDED RELEASE ORAL ONCE
Status: COMPLETED | OUTPATIENT
Start: 2022-05-18 | End: 2022-05-18

## 2022-05-18 RX ORDER — GADOBUTROL 604.72 MG/ML
10 INJECTION INTRAVENOUS ONCE
Status: COMPLETED | OUTPATIENT
Start: 2022-05-18 | End: 2022-05-18

## 2022-05-18 RX ORDER — LORAZEPAM 0.5 MG/1
0.25 TABLET ORAL ONCE
Status: COMPLETED | OUTPATIENT
Start: 2022-05-18 | End: 2022-05-18

## 2022-05-18 RX ORDER — FUROSEMIDE 10 MG/ML
80 INJECTION INTRAMUSCULAR; INTRAVENOUS 2 TIMES DAILY
Status: DISCONTINUED | OUTPATIENT
Start: 2022-05-19 | End: 2022-05-19

## 2022-05-18 RX ORDER — FUROSEMIDE 20 MG
40 TABLET ORAL
Status: DISCONTINUED | OUTPATIENT
Start: 2022-05-18 | End: 2022-05-19

## 2022-05-18 RX ORDER — POTASSIUM CHLORIDE 750 MG/1
20 TABLET, EXTENDED RELEASE ORAL ONCE
Status: COMPLETED | OUTPATIENT
Start: 2022-05-18 | End: 2022-05-18

## 2022-05-18 RX ORDER — POTASSIUM CHLORIDE 29.8 MG/ML
20 INJECTION INTRAVENOUS
Status: COMPLETED | OUTPATIENT
Start: 2022-05-18 | End: 2022-05-18

## 2022-05-18 RX ORDER — FUROSEMIDE 10 MG/ML
40 INJECTION INTRAMUSCULAR; INTRAVENOUS ONCE
Status: COMPLETED | OUTPATIENT
Start: 2022-05-18 | End: 2022-05-18

## 2022-05-18 RX ORDER — FUROSEMIDE 10 MG/ML
80 INJECTION INTRAMUSCULAR; INTRAVENOUS ONCE
Status: DISCONTINUED | OUTPATIENT
Start: 2022-05-18 | End: 2022-05-18

## 2022-05-18 RX ADMIN — GADOBUTROL 10 ML: 604.72 INJECTION INTRAVENOUS at 12:48

## 2022-05-18 RX ADMIN — Medication 3.12 MG: at 20:00

## 2022-05-18 RX ADMIN — ENOXAPARIN SODIUM 70 MG: 80 INJECTION SUBCUTANEOUS at 07:58

## 2022-05-18 RX ADMIN — LORAZEPAM 2 MG: 1 TABLET ORAL at 11:40

## 2022-05-18 RX ADMIN — HYDROXYZINE HYDROCHLORIDE 50 MG: 50 TABLET ORAL at 05:07

## 2022-05-18 RX ADMIN — HYDROXYZINE HYDROCHLORIDE 50 MG: 50 TABLET ORAL at 16:39

## 2022-05-18 RX ADMIN — HYDROXYCHLOROQUINE SULFATE 200 MG: 200 TABLET, FILM COATED ORAL at 19:45

## 2022-05-18 RX ADMIN — SODIUM NITROPRUSSIDE 0.25 MCG/KG/MIN: 25 INJECTION, SOLUTION, CONCENTRATE INTRAVENOUS at 18:18

## 2022-05-18 RX ADMIN — HYDROXYCHLOROQUINE SULFATE 200 MG: 200 TABLET, FILM COATED ORAL at 07:57

## 2022-05-18 RX ADMIN — FUROSEMIDE 40 MG: 10 INJECTION, SOLUTION INTRAVENOUS at 07:58

## 2022-05-18 RX ADMIN — FUROSEMIDE 40 MG: 20 TABLET ORAL at 17:29

## 2022-05-18 RX ADMIN — POTASSIUM CHLORIDE 40 MEQ: 750 TABLET, EXTENDED RELEASE ORAL at 07:57

## 2022-05-18 RX ADMIN — TAMSULOSIN HYDROCHLORIDE 0.4 MG: 0.4 CAPSULE ORAL at 08:11

## 2022-05-18 RX ADMIN — ENOXAPARIN SODIUM 70 MG: 80 INJECTION SUBCUTANEOUS at 21:59

## 2022-05-18 RX ADMIN — SODIUM NITROPRUSSIDE 0.5 MCG/KG/MIN: 25 INJECTION, SOLUTION, CONCENTRATE INTRAVENOUS at 15:16

## 2022-05-18 RX ADMIN — FUROSEMIDE 40 MG: 10 INJECTION, SOLUTION INTRAVENOUS at 09:55

## 2022-05-18 RX ADMIN — POTASSIUM CHLORIDE 20 MEQ: 29.8 INJECTION, SOLUTION INTRAVENOUS at 19:45

## 2022-05-18 RX ADMIN — LORAZEPAM 0.5 MG: 0.5 TABLET ORAL at 08:28

## 2022-05-18 RX ADMIN — FERROUS SULFATE TAB 325 MG (65 MG ELEMENTAL FE) 325 MG: 325 (65 FE) TAB at 13:29

## 2022-05-18 RX ADMIN — Medication 0.25 MG: at 09:46

## 2022-05-18 RX ADMIN — ASPIRIN 81 MG: 81 TABLET, COATED ORAL at 08:10

## 2022-05-18 RX ADMIN — FAMOTIDINE 20 MG: 20 TABLET ORAL at 19:45

## 2022-05-18 RX ADMIN — LORAZEPAM 1 MG: 0.5 TABLET ORAL at 16:40

## 2022-05-18 RX ADMIN — POTASSIUM CHLORIDE 20 MEQ: 29.8 INJECTION, SOLUTION INTRAVENOUS at 21:16

## 2022-05-18 RX ADMIN — ACETAMINOPHEN 650 MG: 325 TABLET ORAL at 19:45

## 2022-05-18 RX ADMIN — HYDROXYZINE HYDROCHLORIDE 50 MG: 50 TABLET ORAL at 11:40

## 2022-05-18 RX ADMIN — FAMOTIDINE 20 MG: 20 TABLET ORAL at 08:10

## 2022-05-18 RX ADMIN — Medication 5 MG: at 21:17

## 2022-05-18 RX ADMIN — POTASSIUM CHLORIDE 20 MEQ: 750 TABLET, EXTENDED RELEASE ORAL at 17:30

## 2022-05-18 RX ADMIN — Medication 1 TABLET: at 13:29

## 2022-05-18 RX ADMIN — ACETAMINOPHEN 650 MG: 325 TABLET ORAL at 03:38

## 2022-05-18 ASSESSMENT — ACTIVITIES OF DAILY LIVING (ADL)
ADLS_ACUITY_SCORE: 19
ADLS_ACUITY_SCORE: 21
ADLS_ACUITY_SCORE: 19
ADLS_ACUITY_SCORE: 19
ADLS_ACUITY_SCORE: 21
ADLS_ACUITY_SCORE: 21
ADLS_ACUITY_SCORE: 19
ADLS_ACUITY_SCORE: 19
ADLS_ACUITY_SCORE: 21

## 2022-05-18 NOTE — PLAN OF CARE
Admitted/transferred from: cath lab/6C  Reason for admission/transfer: cardiogenic shock  2 RN skin assessment: completed by Елена WESTON and Erik olmstead  Result of skin assessment and interventions/actions: Abrasion and blanchable redness to coccyx and buttocks.  Height, weight, drug calc weight: Done  Patient belongings (see Flowsheet)  MDRO education added to care planYes        Major Shift Events: Transferred from cath lab this evening post RHC and swan placement. Alert and oriented, forgetful, anxious. SR/ST, claudia monitoring q6hr, MAP goal 65-75, niprided started. Uses urinal at bedside. 2gram Na diet with 2L FR. K+3.4, need to be replaced.  Plan: continue to monitor hemodynamics and notify MD with changes.  For vital signs and complete assessments, please see documentation flowsheets.

## 2022-05-18 NOTE — PLAN OF CARE
D: Admitted to [facility] 5/16/2022 as a transfer from [facility] in [place] for evaluation of multivessel coronary artery disease and moderate-severe functional mitral regurgitation.  Hx: CAD s/p remote PCI to LAD, ICM LVEF 30% s/p CRT-D, severe MR, APL with hx of DVT/PE on chronic anticoagulation with warfarin, hx of COVID-19 1/2022 (was hospitalized, not intubated), HTN, and HLD     I: Monitored vitals and assessed pt status.   PRN: Ativan 0.5mg for anxiety/nausea  Atarax for anxiety    A: A0x4, forgetful and anxious. Speech is garbled. Pt reported not sleeping for several nights. VSS, on RA. SOB when anxious. SR/ST with PACs. Afebrile. LBM 5/17. Voiding adequately via urinal. NPO for RHC (previous 2g Na+ with 2L FR). PIV SL. Up ad dianna.    Cardiac MRI (completed) and RHC today.    P: Continue to monitor Pt status and report changes to Cards 2 team.

## 2022-05-18 NOTE — CONSULTS
Health Psychology                      Shante Anne, Ph.D., L.P [phone]  Rocio Conner, Ph.D., L.P. [phone]  Faye Myers, Ph.D. [phone]  Chantal Monsalve, Ph.D., L.P. [phone]  Delbert Helton, Ph.D., A.B.P.P., L.P. [phone]         Symone Zeng, Ph.D., L.P. [phone]     [facility], 3rd Floor  [address] [place] [address]    Confidential Summary of Inpatient Health Psychology Consultation*    Date of Service:  05/18/22    Referring Provider:  Medhat Hall MD    Reason for Consultation:  Evaluation and management of anxiety    Sources of Information:  Information was obtained from a clinical interview with the patient and review of medical record.      History of Present Illness:  Per H&P: Dandy Sands is a 61 year old male with history of CAD s/p remote PCI to LAD, ICM LVEF 30% s/p CRT-D, severe MR, APL with hx of DVT/PE on chronic anticoagulation with warfarin, hx of COVID-19 1/2022 (was hospitalized, not intubated), HTN, and HLD who presented to [facility] as a transfer from [facility] in [place] for evaluation of multivessel coronary artery disease and moderate-severe functional mitral regurgitation.    He states that he feels very anxious and has felt this way since his hospitalization.  He states that anxiety primarily manifest in physical sensations such as an internal feeling of restlessness and the need to fidget and move.  Briefly discussed with nursing staff about his presentation of anxiety, which they state they observed fidgeting and him often walking the hallways when he would like to be napping.  He reports no concerns with worry, but does acknowledge that there is stress related to if he can continue working.  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  He also states his daughter is helping him regarding his place of  residence as he feels ready to sell his home and move into an apartment for an easier place to live.  Has Atarax and Ativan that he takes which she reports helps somewhat with his anxiety management.  Otherwise there does not appear to be other helpful coping strategies outside of walking for him.  He describes his children as his support system, although he acknowledges they are adults with their own lives.  For example his daughter recently gave birth to a stillborn baby. Reports his anxiety is not severe outside of the hospital.  Started following his COVID-19 diagnosis in the winter.  States that his parents both  in hospital settings, associating hospitalization with death and likely contributing to fear in this treatment setting.  However, he states he is not afraid to die, citing his [employer] beliefs as a source of support with severe illness.  He describes uncertainty regarding how he feels about surgical management of his disease.       Medical History:  See below lists for past medical history, past surgical history, and current medications    No past medical history on file.    No past surgical history on file.    Current Facility-Administered Medications   Medication     acetaminophen (TYLENOL) tablet 650 mg     alum & mag hydroxide-simethicone (MAALOX) suspension 30 mL     aspirin EC tablet 81 mg     [Held by provider] atorvastatin (LIPITOR) tablet 40 mg     enoxaparin ANTICOAGULANT (LOVENOX) injection 70 mg     famotidine (PEPCID) tablet 20 mg     ferrous sulfate (FEROSUL) tablet 325 mg     furosemide (LASIX) injection 80 mg     [START ON 2022] furosemide (LASIX) injection 80 mg     HOLD nitroGLYcerin IF     hydroxychloroquine (PLAQUENIL) tablet 200 mg     hydrOXYzine (ATARAX) tablet 25 mg    Or     hydrOXYzine (ATARAX) tablet 50 mg     LORazepam (ATIVAN) tablet 0.5-1 mg     melatonin tablet 5 mg     metoprolol succinate ER (TOPROL-XL) 24 hr half-tab 12.5 mg     multivitamin w/minerals  "(THERA-VIT-M) tablet 1 tablet     Patient is already receiving anticoagulation with heparin, enoxaparin (LOVENOX), warfarin (COUMADIN)  or other anticoagulant medication     [START ON 5/19/2022] potassium chloride ER (KLOR-CON M) CR tablet 20 mEq     potassium chloride ER (KLOR-CON M) CR tablet 20 mEq     Reason ACE/ARB/ARNI order not selected     Reason beta blocker not prescribed     tamsulosin (FLOMAX) capsule 0.4 mg     zolpidem (AMBIEN) tablet 5 mg         Psychiatric History:  He reports no past history of anxiety disorder diagnosis or depressive disorders.  He currently reports mood is not depressed, primarily feeling anxious.  He reports no history of psychotherapy or regular psychotropic medication use.  No family history of psychiatric illness endorsed    Social History:  Is currently employed as a  on medical leave from work.  Uncertain about his ability to return to work although he acknowledges it would be okay if he were to retire financially.  Has 2 adult children who provide support from him lives at home alone with his cat, Rubens in [place].    Mental Status/Interview:  He was sitting partially reclined in his hospital bed and was transitioned to sitting upright after difficulty with coughing.  Was alert.  Orientation was not assessed fully, when asked his reason for hospitalization, he stated \"staying alive\".  I did not observe any psychomotor agitation at this session.  He appeared to respond honestly to questions about psychosocial functioning.  Cognition and memory was not formally assessed yet there did appear to be difficulties articulating plan of care and understanding of medical status.  However, his speech was garbled and difficult to understand at times and this may represent a speech issue rather than a cognition issue.  Mood was described to be very anxious and affect was mood congruent.  He reported no suicidal ideation, plan or intent.      Impression/Plan:  . " Shavon is a 61-year-old male currently hospitalized for evaluation of multivessel coronary artery disease and moderate-severe functional mitral regurgitation.  He endorses anxiety in the context of hospitalization, with a history of associating hospitals with death of both parents.  As a result, hospitals may be a stressful environment for him, resulting in an anxiety response that primarily manifests with physical sensations.  He does benefit from a psychotropic medications for management.  Additional recommendations are as follows:    1.  Provided supportive and brief cognitive behavioral interventions today.  Provided psychoeducation about physiologic manifestations of anxiety response with instruction related to diaphragmatic breathing to improve ability to cope with anxiety.  Demonstrated this strategy in front of patient, and upon his practicing he appeared to breathe using short, fast respirations akin to hyperventilation.  Instructed on diaphragmatic breathing, but staff encouraging this approach may benefit from reminding him to use this approach correctly.      Diagnosis:  Anxiety due to medical condition      Chantal Monsalve, PhD,   Clinical Health Psychologist      *In accordance with the Rules of the [place] [employer] of [employer], it is noted that psychological descriptions and scientific procedures underlying psychological evaluations have limitations.  Absolute predictions cannot be made based on information in this report.    This note was completed using Dragon voice recognition software.  Although reviewed after completion, some word and grammatical errors may occur.

## 2022-05-18 NOTE — PROCEDURES
PROCEDURE:   Ultrasound guided L Radial artery line placement attempt    INDICATION: Blood pressure monitoring, shock    PROCEDURE :   Keri Lamb    SUPERVISOR:  Keri Escobar    CONSENT: Obtained and in the paper chart    UNIVERSAL PROTOCOL: Patient Identification was verified, time out was performed, site marking was done. Imaging data reviewed. Full Barrier precaution done: Hands washed, mask, gloves, gown, cap and eye protection all used.     PROCEDURE SUMMARY:   The patient was prepped and draped in the usual sterile manner using chlorhexidine scrub. 1% lidocaine was used to numb the region. The L radial artery was palpated and visualized on ultrasound.  The artery was successfully cannulated on the third pass. Pulsatile, arterial blood was visualized and the artery was then attempt to thread the guide wire using the Seldinger technique. Resistance was met and flow ceased. 3 additional attempts were made. Ultrasound revealed arterial spasm. Discussed this with the patient. He declined trial of his right upper extremity, was amenable to trial of his left arm tomorrow.     Dr Escobar was present for the key portions of the procedure.    ESTIMATED BLOOD LOSS: 5mL    Cindy Hall MD, MPH   Internal Medicine, PGY-2   Pager: [phone]

## 2022-05-18 NOTE — PLAN OF CARE
Goal Outcome Evaluation:        D: Admitted from OSH for work-up for CABG and MVR.  I/A: Patient c/o of lack of sleep for two days, prior to the shift he received all night time meds, presumed to give patient ambien and tylenol.  Patient was able to sleep 0000 to 0200 without interruption, woke up at 0200, restless and ambulated in the hallway was given atarax po.  Patient is up ad dianna, voiding via bedside urinal.  Patient had episode of SVT/afib provider was notified and was instructed to monitor closely and to follow with 12-lead EKG with the next occurrence.  He is on 2G sodium and 2L FR  P: Will continue to monitor and notify MD of status changes.

## 2022-05-18 NOTE — CONSULTS
Patient seen in the [facility], on patient care unit 6C for psychosocial assessment. 60 minutes spent with patient. 100% of visit consisted of counseling related to issues surrounding LVAD and Heart Transplant.    Psychosocial Assessment   Name: Dandy Sands     MRN:  [id number]        Patient Name / Age / Race: Dandy Sands 61 year old    Source of Information: Patient   : JAMARCUS Richardson   Interview Date: May 18, 2022   Reason for Referral: Heart Transplant and Mechanical Circulatory Support     Current Living Situation   Location (City/State): [facility] [address]  [place] [address]   With Whom: Living arrangements - the patient lives alone.   Type of Home: single family one level home with 3 steps to enter   Working Phone? Yes    [employer] Prong[employer] Outlet? Yes    Distance from Hospital: 4.5 hours   Need to Relocate Post Surgery? Yes    Discussed? Yes      Vocational/Employment/Financial   Employment: Full time   Occupation:    Education: Dropped out in 11th grade, but got GED   [place]? No   Income: salary/wages   Insurance: Private Insurance   Name of Private Insurance: [employer]   Medication Coverage: Covered by [facility]. Highest copay for him has been $99    In current situation, pt. can afford medication and supply costs:  Yes    Other Financial Concerns/Issues: No financial struggles, but worried if unable to go back to work. Thinking about applying for SSDI, but this won't be immediate     Family/Social Support   Marital Status: -35 years ago   Partner Name: Not in relationship currently   Length of Time : N/A   Partner s Employment: N/A   Relationship: other: N/A   Children: 2 Adult Children; Asha-40 (just had a baby in July) works full-time, but can work remotely. SonThomas is 37 and owns a glass blowing business.   Parents:    Siblings: 1 Sister in [place]. Not that close. 2 other Sisters have passed away   Other Family or  Friends Close by: [place]   Support System: available, helpful   Primary Support Person: Would be his Dtr and Son   Issues: None identified right now, but have not talked with family about caregiving responsibilities     Activities/Functional Ability   Current Level: ambulatory and independent with ADL's   Daily Activities: Was working and able to manage all ADLs and in-home chores/maintenance. However, has been unable to do any of the outside work due to heart failure   Transportation: self      Medical Status   Patient s understanding of need for surgical intervention: Good   Advanced Directive? Still need to address with the patient    Details: His adult children would most likely be appropriate decision-makers   Adherence History: No reported issues with non-compliance. Reports he follows regularly with his Cardiologist, PCP, and Rheumatologist. Takes meds as prescribed    Ability to Adhere to Complex Medical Regime: Yes     Behavioral   Chemical Dependency Issues: No-reports occasional beer or whiskey. Reports Dad was alcoholic. In his late teens/early 20's, reports Mom asked him to cut back on alcohol intake and went to treatment when he was 19. States he will drink 1-2 drinks while playing pool. Denies all illicit drug use.   Smoker? No-quit in 2009   Psychiatric impairment: No-but reports feeling anxious the last 3 weeks during this hospitalization ([place] and now [facility])   Coping Style/Strategies: Reports biggest life stressor has been these last 3 weeks. Verbally reports feeling very anxious and asking for Ativan, but states he has only started taking it during this hospitalization   Recent Legal History: No-DWI at age [age]   Teaching Completed During Assessment Related To Transplant and Mechanical Circulatory Support: 1. Housing and relocation needs post surgery.  2. Caregiver needs post surgery.  3. Financial issues related to surgery.  4. Risks of alcohol use post surgery.  5. Common psychosocial  stressors pre/post surgery.  6. Support group availability.   Psychosocial Risks Reviewed Related To Transplant and Mechanical Circulatory Support: 1. Increased stress related to your emotional, family, social, employment, or financial situation.  2. Affect on work and/or disability benefits.  3. Affect on future health and life insurance.  4. Outcome expectations may not be met.  5. Mental Health risks: anxiety, depression, PTSD, guilt, grief and chronic fatigue.     Observed Behavior   Well informed? Yes  Angry? No   Process info well? Yes  Hostile? No   Evasive? No Oriented X3? Yes    Cautious/Suspicious? No Motivated? Yes    Appropriate Behavior? Yes  Depressed? No   Appropriate Affect? Yes  Anxious? No   Interview Observations: Very engaged in the conversation and seemed able to process information well. Good eye contact. Cooperative and friendly     Selection Criteria Met   Plan for Support We still need to talk about caregiver support with his family   Chemical Dependence Yes    Smoking Yes    Mental Health Yes    Adequate Finances Yes      Risk Issues:    None identified upon today's interview    Final Evaluation/Assessment:     Dandy was sitting in his chair in the room, but asked to get back into bed upon writer's arrival. He seemed friendly and forthcoming with information. He lives alone and reports having a good relationship with his Dtr-Asha and Son-Amos, who both live within 20 miles of him. We are just talking about VAD/TX for the first time, so probably needs a little bit more time to think about this situation and talk with his Children. Writer would also need to speak with family about caregiving duties.    Dandy does not seem to have a history of mental health issues. He admits to feeling anxiety currently and states he has been highly anxious for the last three weeks during his hospitalization in [place] and now here at the [facility]. Reports this as the most stressful situation he has gone  through. Although he reports anxiety, he is cooperative, affect appropriate, and able to ask good questions. Therefore, writer doesn't have concerns about his mental health stability at this time.    Dandy denies any problems with substance use. Reports in his late teens/early 20's drank heavily. He had a DWI when he was [age] and entered CD treatment at that time. Since then, Dandy reports only a drink or two when he plays pool and denies any problematic behavior since his late Teens. He denies use of cannabis or any other illegal substance.    Dandy currently has appropriate medical insurance to proceed with VAD or transplant, but would not be able to work as a  for several months, or even not return at all. Finances would get tight. He will need to apply for social security disability, which he reports Dtr has started the application process online for SSDI. He will also need reach out to his place of employment to find out if he has short and/or long-term disability.     Have not finished the eval completely, so continue to meet with Dandy over the next day or so to finish some of the important psychosocial conversations needed when facing potential VAD. Dandy wants writer to hold off on talking to his family about caregiving until the team has decided what he needs in terms of surgical options    Patient understands risks and benefits of Heart Transplant and Mechanical Circulatory Support: Not completely yet    Recommendation:    At this point, Dandy seems like a reasonable candidate for either VAD or transplant, but still need to explore a caregiver plan and have other psychosocial discussions about VAD or Transplant    Signature: Yas Ennis, JAMARCUS     Title: Licensed Independent Clinical                Interview Date: May 18, 2022

## 2022-05-18 NOTE — PLAN OF CARE
Shift:   VS: Temp: 98  F (36.7  C) Temp src: Axillary BP: 91/69 Pulse: 92   Resp: 16 SpO2: 93 %      Pain: Denies  Neuro: A&OX4, calls appropriately  Cardiac:   SR, denies chest pain/palpitations  Respiratory: RA, denies SOB  GI/Diet/Appetite: Good oral intake, denies N/V  :  Adequate UO, using urinal at bedside  LDA's: PIV SL  Skin: No new deficit noted  Activity: Up ad dianna  Tests/Procedures:   Pertinent Labs/Lab Collection: Potassium replaced     Plan: Continue with cares and update team with any changes.

## 2022-05-18 NOTE — PROGRESS NOTES
[facility]    Cardiology Progress Note- Cards 2      Date of Admission:  5/16/2022     Assessment & Plan: HVSL   Dandy EDUARD Sands is a 61 year old male with history of CAD s/p remote PCI to LAD, ICM LVEF 30% s/p CRT-D, severe MR, APL with hx of DVT/PE on chronic anticoagulation with warfarin, hx of COVID-19 1/2022 (was hospitalized, not intubated), HTN, and HLD who was admitted to [facility] 5/16/2022 as a transfer from [facility] in [place] for evaluation of multivessel coronary artery disease and moderate-severe functional mitral regurgitation.    Today 5/18:   - cardiac MRI today  - RHC case request placed  - scheduled furosemide 80mg IV bid  - scheduled potassium 20mEq bid  - potassium 40mEq once this AM for K of 3.6  - Health psych consult (will see him today)  - assessing for coverage of advanced heart failure workup     # ICM (5/17/2022 LVEF 10-15%)  # CAD s/p PCI to LAD (2005)  # Severe ostial LAD disease with in-stent restenosis of mid LAD at diag bifurcation, severe proximal RCA disease, mild circ disease  # Hx of MI (2007)  # S/p CRT-D (Medtronic 2006, gen change 2012)  # Severe MR  Transferred from [facility] in [place] 5/16/2022 for evaluation of CABG + MVR at a center with ECMO/VAD as a back up. CVTS involved. Hypervolemic so diuresing. Main symptom is orthopnea. His EF has been reduced for years. At the OSH, his EF was reported as 30-35%, however, his EF on TTE here at [facility] is 10-15%. Given this degree of LV dysfunction, cardiac surgery would be higher risk and will therefore proceed by assessing his eligibility for advanced heart failure therapies while he is here while also hearing back opinions from our surgery colleagues regarding his surgical candidacy.   - CVTS consulted, appreciate recs              - TTE to reassess LVEF and MR: severe LV dilation, LVEF 10-15%, severe diffuse hypokinesis, severe mitral insufficiency                - Cardiac MRI to assess viability (scheduled 5/18 12:30)              - Vein mapping: Bilateral great saphenous veins are patent              - Bilateral carotid duplex US: right <50% stenosis, left normal              - CT chest w/o contrast: mild/mod emphesema              - Rheumatology consult to assess whether he can hold or reduce doses of immunosuppression (holding MMF per Rheum recs)              - Dental consult for pre-op clearance prior to possible valve replacement; cleared dentally for surgery              - Plan for CABG, possible MVR this admission pending workup  - ACEI/ARB/ARNI- no  - BB- continue pta metoprolol succinate 12.5mg qhs  - Aldosterone receptor antagonist- no  - SGLT2i- no  - Antiplatelet- continue pta aspirin 81mg daily, holding pta Plavix 75 mg daily  - Statin- continue pta atorvastatin 40mg daily (holding in the setting of liver injury)  - Volume status: hypervolemic  - Diuretic- furosemide 80mg IV bid (pta on furosemide po 40mg bid)   - scheduled potassium 20mEq bid  - SCD- S/p CRT-D (Medtronic 2006, gen change 2012)  - OT consult  - Nutrition consult  - Strict I&Os  - Daily weights  - Telemetry  - RHC case request placed (NPO for possible RHC)  - assessing for coverage of advanced heart failure workup    # Liver injury, mixed hepatocellular & cholestatic, acute  Noted 5/17. Likely in setting of hepatic congestion. Diuresis as above.   - Follow CMP q12h  - holding statin as above     # SLE  # APL  # Hx of DVT and PE  PTA on warfarin (he took 6.5mg daily). Transitioned to Lovenox at OSH in anticipation of surgery. He reports that his main symptom of lupus is diffuse joint pains that is tolerable with Tylenol.   - Rheumatology consulted, as above   - Lovenox 70mg q12h  - Continue pta hydroxychloroquine 200mg bid   - Holding pta mycophenolic acid 1500mg q12h per Rheumatology recs in the event he has surgery (In general for patients with compensated lupus undergoing elective surgery we  recommend holding MMF one week prior to surgery and re-starting 3-5 days after surgery in the absence of surgical site infection, poor wound healing or infection elsewhere.)  - Tylenol prn for pain     # Anxiety  # Insomnia  Continues to report severe anxiety.  - PTA lorazepam 0.5-1 mg q6h PRN (pta q4h prn)  - PTA zolpidem 5mg at bedtime prn  - Hydroxyzine prn for anxiety  - Melatonin scheduled  - Psych consult  _________________  Chronic/stable:     #Anemia, chronic  - Continue pta ferrous sulfate 325mg daily      #GERD  - Continue pta famotidine 20mg bid     #BPH  - Continue pta tamsulosin 0.4mg daily     #Hx of COVID-19  Had COVID ~1/2022, was hospitalized for ~6 days, was on BiPap for a little, was not intubated.       Diet:   2g Na diet, 2L fluid restriction (NPO now for possible RHC)  DVT Prophylaxis: Lovenox  Fitzgerald Catheter: Not present  Code Status: Full      Disposition Plan   Expected discharge: 4 - 7 days, recommended to TBD once euvolemic, MR/CAD managed or plan established.    Entered: Austin Can MD 05/18/2022, 8:22 AM       The patient's care was discussed with the Attending Physician, Dr. Lora.    Austin Can MD  [facility]    ______________________________________________________________________    Interval History   Overnight had a brief 9 second run of what was called SVT vs. Afib. Had some difficulty sleeping. Has some breathing difficulty that is worse with lying down. No fevers, chills, headache, chest pain, abdominal pain, nausea, vomiting.     Data reviewed today: I reviewed all medications, new labs and imaging results over the last 24 hours.     Physical Exam   Vital Signs: Temp: 97.4  F (36.3  C) Temp src: Axillary BP: 98/60 Pulse: 91   Resp: 16 SpO2: 99 %      Weight: 157 lbs 11.2 oz  General: sitting at side of bed, NAD, appears anxious, has some more labored breathing when lying down in bed  HEENT: no scleral icterus or  conjunctival injection, oropharynx clear  Resp: CTAB, no wheezing  Cardiac: regular rate and rhythm, 3/6 systolic murmur at apex. JVD to mid neck when lying at 30 degrees  Abdomen: soft, non-tender, non-distended. No rebound or guarding.  Extremities: warm, no lower extremity edema  MSK: some reduced muscle bulk  Neuro: alert, oriented x4. No focal neuro deficits.  Psych: appropriate mood and affect     Data   Recent Labs   Lab 05/18/22  0636 05/17/22  1838 05/17/22  1147 05/17/22  0556 05/16/22  1442   WBC  --   --   --  10.9 12.6*   HGB  --   --   --  8.5* 8.8*   MCV  --   --   --  92 92   PLT  --   --   --  364 344   INR 1.36*  --   --  1.30* 1.23*    139  --  138 135   POTASSIUM 3.6 3.4 3.6 3.9 4.7   CHLORIDE 104 104  --  106 103   CO2 22 24  --  21 23   BUN 28 28  --  29 26   CR 1.20 1.07  --  0.97 0.96   ANIONGAP 12 11  --  11 9   ELDA 9.2 8.8  --  9.3 9.6   * 120*  --  114* 131*   ALBUMIN 3.5 3.4  --  3.2* 3.5  3.5   PROTTOTAL 7.5 7.0  --  6.9 7.4  7.4   BILITOTAL 0.7 0.4  --  0.4 0.4  0.4   ALKPHOS 86 85  --  88 90  90   * 145*  --  137* 56  56   AST 74* 80*  --  104* 44  44     Recent Results (from the past 24 hour(s))   XR Chest Port 1 View    Narrative    EXAM: XR CHEST PORT 1 VIEW  5/17/2022 8:32 AM     HISTORY:  heart failure       COMPARISON:  CT chest and Chest radiograph 5/16/2022    FINDINGS:     Portable upright view of the chest. Left chest wall cardiac pacemaker  with leads in stable position. Stable cardiomegaly. Unchanged upper  lobe predominant reticular opacities and bibasilar opacities. No acute  airspace disease.    No acute osseous abnormality. Visualized upper abdomen is  unremarkable.        Impression    IMPRESSION:   1. No acute airspace disease. Interstitial/reticular changes again  noted.  2. Cardiomegaly.    I have personally reviewed the examination and initial interpretation  and I agree with the findings.    GABRIEL COURTNEY MD         SYSTEM ID:  [id number]    Echo Complete   Result Value    LVEF  10-15% (severely reduced)    Narrative    [id number]  [id number]  [id number]  [id number]^PILY^MARINA     [facility]  Echocardiography Laboratory  [address] [place] [id number]     Name: OLEG KAUR  MRN: [id number]  : 1960  Study Date: 2022 10:12 AM  Age: 61 yrs  Gender: Male  [place] [facility]  Reason For Study: Mitral Regurgitation  Ordering Physician: MARINA NASCIMENTO  Performed By: Nacho Alcazar RDCS     BSA: 1.8 m2  Height: 67 in  Weight: 147 lb  BP: 96/72 mmHg  ______________________________________________________________________________  Procedure  Echocardiogram with two-dimensional, color and spectral Doppler performed.  Contrast Optison. Patient was given 9 ml mixture of 3 ml Optison and 6 ml  saline.  ______________________________________________________________________________  Interpretation Summary  Severe left ventricular dilation is present. Left ventricular function is  decreased. The ejection fraction is 10-15% (severely reduced). Severe diffuse  hypokinesis is present.  Mild right ventricular dilation is present. Global right ventricular function  is mildly reduced.  Severe functional mitral insufficiency is present.  Pulmonary hypertension is present. Estimated pulmonary artery systolic  pressure is 44 mmHg plus right atrial pressure.  Dilation of the inferior vena cava is present with abnormal respiratory  variation in diameter. Mean RA pressure estiamted at 15 mmHg.  No pericardial effusion is present.  ______________________________________________________________________________  Left Ventricle  Severe left ventricular dilation is present. Left ventricular function is  decreased. The ejection fraction is 10-15% (severely reduced). Left  ventricular diastolic function is abnormal. Severe diffuse hypokinesis is  present. There is no thrombus.     Right Ventricle  Mild right ventricular  dilation is present. Global right ventricular function  is mildly reduced. A pacemaker lead is noted in the right ventricle.     Atria  The right atria appears normal. Severe left atrial enlargement is present.     Mitral Valve  Severe mitral insufficiency is present. The cause of the mitral insufficiency  appears to be altered left ventricular size and geometry. Several features of  severe MR present - E velocity 1.6 m/s, systolic flow reversal in the LLPV,  EROA = 0.57 cm2, MR volume 58 mL.     Aortic Valve  The aortic valve is tricuspid. Trace aortic insufficiency is present.     Tricuspid Valve  Mild tricuspid insufficiency is present. Estimated pulmonary artery systolic  pressure is 44 mmHg plus right atrial pressure. Pulmonary hypertension is  present.     Pulmonic Valve  Mild pulmonic insufficiency is present.     Vessels  The aorta root is normal. Dilation of the inferior vena cava is present with  abnormal respiratory variation in diameter. IVC diameter >2.1 cm collapsing  <50% with sniff suggests a high RA pressure estimated at 15 mmHg or greater.     Pericardium  No pericardial effusion is present.     Compared to Previous Study  There is no prior study for direct comparison.  ______________________________________________________________________________  MMode/2D Measurements & Calculations  IVSd: 0.72 cm  LVIDd: 6.1 cm  LVIDs: 5.4 cm  LVPWd: 1.0 cm  FS: 10.9 %  LV mass(C)d: 211.9 grams  LV mass(C)dI: 119.5 grams/m2  LA dimension: 5.4 cm  asc Aorta Diam: 3.1 cm  LVOT diam: 2.1 cm  LVOT area: 3.5 cm2  RWT: 0.34     Doppler Measurements & Calculations  MV E max chacorta: 149.7 cm/sec  MV dec slope: 1120 cm/sec2  MV dec time: 0.14 sec  Ao V2 max: 101.0 cm/sec  Ao max P.0 mmHg  Ao V2 mean: 76.0 cm/sec  Ao mean PG: 3.0 mmHg  Ao V2 VTI: 16.2 cm  OCTAVIA(I,D): 3.1 cm2  OCTAVIA(V,D): 2.4 cm2  LV V1 max P.0 mmHg  LV V1 max: 70.9 cm/sec  LV V1 VTI: 14.4 cm  SV(LVOT): 49.9 ml  SI(LVOT): 28.1 ml/m2     PA V2 max: 73.3  cm/sec  PA max P.1 mmHg  PI end-d abhilash: 149.0 cm/sec  TR max abhilash: 330.0 cm/sec  TR max P.6 mmHg  AV Abhilash Ratio (DI): 0.70  OCTAVIA Index (cm2/m2): 1.7  E/E' av.1  Lateral E/e': 16.9  Medial E/e': 45.2     ______________________________________________________________________________  Report approved by: Nakia Gaines 2022 11:46 AM         CT Dental wo Contrast    Narrative    CT DENTAL WO CONTRAST 2022 11:38 AM    History:  IP Consult for pre-LVAD dental evaluation    Comparison:  None      TECHNIQUE: 3-D reconstruction by the technologists, with curved  multiplanar reformat of thin section imaging through the mandible and  maxilla obtained without intravenous contrast.    FINDINGS:  No significant soft tissue swelling or mass. Normal facial bone  alignment. No bony erosion.     Visualized portions of the paranasal sinuses demonstrate polypoid  mucosal thickening of the left maxillary sinus.. Minimal to mild  mucosal thickening in the ethmoid air cells, frontal sinus, and right  maxillary sinus. Normal temporomandibular joints. Nasal septal  perforation.      Impression    IMPRESSION:   1. Suspect dental caries along the distal surface of the right  mandibular canine, although this could be artifactual. No periapical  abscess.  2. Paranasal sinus mucosal thickening without air-fluid levels to  suggest acute sinusitis.    I have personally reviewed the examination and initial interpretation  and I agree with the findings.    SHAUN OCHOA MD         SYSTEM ID:  [id number]

## 2022-05-18 NOTE — PROGRESS NOTES
Vitals: VSS. Afebrile.   Neuro:  Pt is oriented to self and place. Pt has a tendency to be forgetful. Respiratory: No cough. Slight SOB with activity or when pt becomes anxious.   GI/:  Pt's last BM was 05/17/2022. No pain with BM. Pt has been urinating freely with no pain.  Diet/appetite: Fair appetite. Fluid restrictions currently.   Activity:  Pt is able to walk independently.   Pain:  Denied pain this shift.  Skin:  WNL.  LDA's: Left PIV x 1.- CDI and patent.   Plan:  Call light within reach and will continue to monitor.

## 2022-05-19 ENCOUNTER — APPOINTMENT (OUTPATIENT)
Dept: GENERAL RADIOLOGY | Facility: CLINIC | Age: 62
DRG: 246 | End: 2022-05-19
Attending: INTERNAL MEDICINE
Payer: COMMERCIAL

## 2022-05-19 ENCOUNTER — REFERRAL (OUTPATIENT)
Dept: TRANSPLANT | Facility: CLINIC | Age: 62
End: 2022-05-19

## 2022-05-19 ENCOUNTER — APPOINTMENT (OUTPATIENT)
Dept: CT IMAGING | Facility: CLINIC | Age: 62
DRG: 246 | End: 2022-05-19
Attending: INTERNAL MEDICINE
Payer: COMMERCIAL

## 2022-05-19 ENCOUNTER — TELEPHONE (OUTPATIENT)
Dept: NEUROPSYCHOLOGY | Facility: CLINIC | Age: 62
End: 2022-05-19

## 2022-05-19 ENCOUNTER — APPOINTMENT (OUTPATIENT)
Dept: CARDIOLOGY | Facility: CLINIC | Age: 62
DRG: 246 | End: 2022-05-19
Attending: INTERNAL MEDICINE
Payer: COMMERCIAL

## 2022-05-19 LAB
ABO/RH(D): NORMAL
ALBUMIN SERPL-MCNC: 3.2 G/DL (ref 3.4–5)
ALBUMIN SERPL-MCNC: 3.2 G/DL (ref 3.4–5)
ALBUMIN UR-MCNC: 10 MG/DL
ALP SERPL-CCNC: 74 U/L (ref 40–150)
ALP SERPL-CCNC: 74 U/L (ref 40–150)
ALT SERPL W P-5'-P-CCNC: 198 U/L (ref 0–70)
ALT SERPL W P-5'-P-CCNC: 213 U/L (ref 0–70)
ANION GAP SERPL CALCULATED.3IONS-SCNC: 10 MMOL/L (ref 3–14)
ANION GAP SERPL CALCULATED.3IONS-SCNC: 9 MMOL/L (ref 3–14)
ANTIBODY SCREEN: NEGATIVE
APPEARANCE UR: CLEAR
AST SERPL W P-5'-P-CCNC: 74 U/L (ref 0–45)
AST SERPL W P-5'-P-CCNC: 94 U/L (ref 0–45)
ATRIAL RATE - MUSE: 91 BPM
BASE EXCESS BLDV CALC-SCNC: -0.3 MMOL/L (ref -7.7–1.9)
BASE EXCESS BLDV CALC-SCNC: -1.1 MMOL/L (ref -7.7–1.9)
BASE EXCESS BLDV CALC-SCNC: -1.6 MMOL/L (ref -7.7–1.9)
BASE EXCESS BLDV CALC-SCNC: 0.4 MMOL/L (ref -7.7–1.9)
BASE EXCESS BLDV CALC-SCNC: 1.6 MMOL/L (ref -7.7–1.9)
BASOPHILS # BLD AUTO: 0 10E3/UL (ref 0–0.2)
BASOPHILS NFR BLD AUTO: 0 %
BILIRUB SERPL-MCNC: 0.6 MG/DL (ref 0.2–1.3)
BILIRUB SERPL-MCNC: 0.7 MG/DL (ref 0.2–1.3)
BILIRUB UR QL STRIP: NEGATIVE
BUN SERPL-MCNC: 19 MG/DL (ref 7–30)
BUN SERPL-MCNC: 19 MG/DL (ref 7–30)
CALCIUM SERPL-MCNC: 8.7 MG/DL (ref 8.5–10.1)
CALCIUM SERPL-MCNC: 8.7 MG/DL (ref 8.5–10.1)
CHLORIDE BLD-SCNC: 104 MMOL/L (ref 94–109)
CHLORIDE BLD-SCNC: 106 MMOL/L (ref 94–109)
CO2 SERPL-SCNC: 23 MMOL/L (ref 20–32)
CO2 SERPL-SCNC: 25 MMOL/L (ref 20–32)
COLOR UR AUTO: YELLOW
COTININE SERPL-MCNC: <2 NG/ML
CREAT SERPL-MCNC: 0.94 MG/DL (ref 0.66–1.25)
CREAT SERPL-MCNC: 0.96 MG/DL (ref 0.66–1.25)
DIASTOLIC BLOOD PRESSURE - MUSE: NORMAL MMHG
EOSINOPHIL # BLD AUTO: 0.1 10E3/UL (ref 0–0.7)
EOSINOPHIL NFR BLD AUTO: 1 %
ERYTHROCYTE [DISTWIDTH] IN BLOOD BY AUTOMATED COUNT: 18.4 % (ref 10–15)
GFR SERPL CREATININE-BSD FRML MDRD: 90 ML/MIN/1.73M2
GFR SERPL CREATININE-BSD FRML MDRD: >90 ML/MIN/1.73M2
GLUCOSE BLD-MCNC: 81 MG/DL (ref 70–99)
GLUCOSE BLD-MCNC: 95 MG/DL (ref 70–99)
GLUCOSE UR STRIP-MCNC: NEGATIVE MG/DL
HCO3 BLDV-SCNC: 22 MMOL/L (ref 21–28)
HCO3 BLDV-SCNC: 23 MMOL/L (ref 21–28)
HCO3 BLDV-SCNC: 24 MMOL/L (ref 21–28)
HCO3 BLDV-SCNC: 24 MMOL/L (ref 21–28)
HCO3 BLDV-SCNC: 25 MMOL/L (ref 21–28)
HCT VFR BLD AUTO: 27.3 % (ref 40–53)
HEMOCCULT STL QL: NEGATIVE
HGB BLD-MCNC: 8.5 G/DL (ref 13.3–17.7)
HGB UR QL STRIP: NEGATIVE
IMM GRANULOCYTES # BLD: 0 10E3/UL
IMM GRANULOCYTES NFR BLD: 1 %
INR PPP: 1.29 (ref 0.85–1.15)
INTERPRETATION ECG - MUSE: NORMAL
KETONES UR STRIP-MCNC: 10 MG/DL
LEUKOCYTE ESTERASE UR QL STRIP: NEGATIVE
LYMPHOCYTES # BLD AUTO: 0.8 10E3/UL (ref 0.8–5.3)
LYMPHOCYTES NFR BLD AUTO: 13 %
MAGNESIUM SERPL-MCNC: 2.1 MG/DL (ref 1.6–2.3)
MCH RBC QN AUTO: 28.6 PG (ref 26.5–33)
MCHC RBC AUTO-ENTMCNC: 31.1 G/DL (ref 31.5–36.5)
MCV RBC AUTO: 92 FL (ref 78–100)
MONOCYTES # BLD AUTO: 0.5 10E3/UL (ref 0–1.3)
MONOCYTES NFR BLD AUTO: 9 %
NEUTROPHILS # BLD AUTO: 4.3 10E3/UL (ref 1.6–8.3)
NEUTROPHILS NFR BLD AUTO: 76 %
NICOTINE SERPL-MCNC: <2 NG/ML
NITRATE UR QL: NEGATIVE
NRBC # BLD AUTO: 0 10E3/UL
NRBC BLD AUTO-RTO: 0 /100
O2/TOTAL GAS SETTING VFR VENT: 0 %
O2/TOTAL GAS SETTING VFR VENT: 21 %
OXYHGB MFR BLDV: 40 % (ref 70–75)
OXYHGB MFR BLDV: 42 % (ref 70–75)
OXYHGB MFR BLDV: 47 % (ref 70–75)
OXYHGB MFR BLDV: 55 % (ref 70–75)
OXYHGB MFR BLDV: 62 % (ref 70–75)
P AXIS - MUSE: 66 DEGREES
PCO2 BLDV: 33 MM HG (ref 40–50)
PCO2 BLDV: 34 MM HG (ref 40–50)
PCO2 BLDV: 35 MM HG (ref 40–50)
PCO2 BLDV: 35 MM HG (ref 40–50)
PCO2 BLDV: 36 MM HG (ref 40–50)
PH BLDV: 7.43 [PH] (ref 7.32–7.43)
PH BLDV: 7.44 [PH] (ref 7.32–7.43)
PH BLDV: 7.44 [PH] (ref 7.32–7.43)
PH BLDV: 7.45 [PH] (ref 7.32–7.43)
PH BLDV: 7.46 [PH] (ref 7.32–7.43)
PH UR STRIP: 6 [PH] (ref 5–7)
PLATELET # BLD AUTO: 274 10E3/UL (ref 150–450)
PO2 BLDV: 26 MM HG (ref 25–47)
PO2 BLDV: 27 MM HG (ref 25–47)
PO2 BLDV: 29 MM HG (ref 25–47)
PO2 BLDV: 32 MM HG (ref 25–47)
PO2 BLDV: 37 MM HG (ref 25–47)
POTASSIUM BLD-SCNC: 3.6 MMOL/L (ref 3.4–5.3)
POTASSIUM BLD-SCNC: 3.7 MMOL/L (ref 3.4–5.3)
POTASSIUM BLD-SCNC: 3.7 MMOL/L (ref 3.4–5.3)
POTASSIUM BLD-SCNC: 4.2 MMOL/L (ref 3.4–5.3)
PR INTERVAL - MUSE: 198 MS
PROT SERPL-MCNC: 6.6 G/DL (ref 6.8–8.8)
PROT SERPL-MCNC: 6.8 G/DL (ref 6.8–8.8)
QRS DURATION - MUSE: 156 MS
QT - MUSE: 420 MS
QTC - MUSE: 516 MS
R AXIS - MUSE: -19 DEGREES
RBC # BLD AUTO: 2.97 10E6/UL (ref 4.4–5.9)
RBC URINE: <1 /HPF
SODIUM SERPL-SCNC: 138 MMOL/L (ref 133–144)
SODIUM SERPL-SCNC: 139 MMOL/L (ref 133–144)
SP GR UR STRIP: 1.02 (ref 1–1.03)
SPECIMEN EXPIRATION DATE: NORMAL
SYSTOLIC BLOOD PRESSURE - MUSE: NORMAL MMHG
T AXIS - MUSE: 128 DEGREES
TRANS-3-OH-COTININE SERPL-MCNC: <2 NG/ML
UROBILINOGEN UR STRIP-MCNC: NORMAL MG/DL
VENTRICULAR RATE- MUSE: 91 BPM
WBC # BLD AUTO: 5.7 10E3/UL (ref 4–11)
WBC URINE: 0 /HPF

## 2022-05-19 PROCEDURE — 85610 PROTHROMBIN TIME: CPT

## 2022-05-19 PROCEDURE — 70450 CT HEAD/BRAIN W/O DYE: CPT | Mod: 26 | Performed by: RADIOLOGY

## 2022-05-19 PROCEDURE — 86850 RBC ANTIBODY SCREEN: CPT | Performed by: INTERNAL MEDICINE

## 2022-05-19 PROCEDURE — 250N000013 HC RX MED GY IP 250 OP 250 PS 637

## 2022-05-19 PROCEDURE — 250N000011 HC RX IP 250 OP 636: Performed by: INTERNAL MEDICINE

## 2022-05-19 PROCEDURE — 80307 DRUG TEST PRSMV CHEM ANLYZR: CPT | Performed by: STUDENT IN AN ORGANIZED HEALTH CARE EDUCATION/TRAINING PROGRAM

## 2022-05-19 PROCEDURE — 250N000013 HC RX MED GY IP 250 OP 250 PS 637: Performed by: STUDENT IN AN ORGANIZED HEALTH CARE EDUCATION/TRAINING PROGRAM

## 2022-05-19 PROCEDURE — 71045 X-RAY EXAM CHEST 1 VIEW: CPT | Mod: 26 | Performed by: RADIOLOGY

## 2022-05-19 PROCEDURE — 83735 ASSAY OF MAGNESIUM: CPT

## 2022-05-19 PROCEDURE — 71045 X-RAY EXAM CHEST 1 VIEW: CPT

## 2022-05-19 PROCEDURE — 70450 CT HEAD/BRAIN W/O DYE: CPT

## 2022-05-19 PROCEDURE — 82805 BLOOD GASES W/O2 SATURATION: CPT | Performed by: STUDENT IN AN ORGANIZED HEALTH CARE EDUCATION/TRAINING PROGRAM

## 2022-05-19 PROCEDURE — 84155 ASSAY OF PROTEIN SERUM: CPT | Performed by: STUDENT IN AN ORGANIZED HEALTH CARE EDUCATION/TRAINING PROGRAM

## 2022-05-19 PROCEDURE — 71046 X-RAY EXAM CHEST 2 VIEWS: CPT

## 2022-05-19 PROCEDURE — 87086 URINE CULTURE/COLONY COUNT: CPT | Performed by: STUDENT IN AN ORGANIZED HEALTH CARE EDUCATION/TRAINING PROGRAM

## 2022-05-19 PROCEDURE — 200N000002 HC R&B ICU UMMC

## 2022-05-19 PROCEDURE — 71250 CT THORAX DX C-: CPT

## 2022-05-19 PROCEDURE — 250N000011 HC RX IP 250 OP 636: Performed by: STUDENT IN AN ORGANIZED HEALTH CARE EDUCATION/TRAINING PROGRAM

## 2022-05-19 PROCEDURE — 80323 ALKALOIDS NOS: CPT | Performed by: STUDENT IN AN ORGANIZED HEALTH CARE EDUCATION/TRAINING PROGRAM

## 2022-05-19 PROCEDURE — 71250 CT THORAX DX C-: CPT | Mod: 26 | Performed by: RADIOLOGY

## 2022-05-19 PROCEDURE — 84132 ASSAY OF SERUM POTASSIUM: CPT | Performed by: STUDENT IN AN ORGANIZED HEALTH CARE EDUCATION/TRAINING PROGRAM

## 2022-05-19 PROCEDURE — 81001 URINALYSIS AUTO W/SCOPE: CPT | Performed by: STUDENT IN AN ORGANIZED HEALTH CARE EDUCATION/TRAINING PROGRAM

## 2022-05-19 PROCEDURE — 86225 DNA ANTIBODY NATIVE: CPT | Performed by: STUDENT IN AN ORGANIZED HEALTH CARE EDUCATION/TRAINING PROGRAM

## 2022-05-19 PROCEDURE — 86901 BLOOD TYPING SEROLOGIC RH(D): CPT | Performed by: INTERNAL MEDICINE

## 2022-05-19 PROCEDURE — 86160 COMPLEMENT ANTIGEN: CPT | Performed by: STUDENT IN AN ORGANIZED HEALTH CARE EDUCATION/TRAINING PROGRAM

## 2022-05-19 PROCEDURE — 250N000011 HC RX IP 250 OP 636

## 2022-05-19 PROCEDURE — 85025 COMPLETE CBC W/AUTO DIFF WBC: CPT

## 2022-05-19 PROCEDURE — 99291 CRITICAL CARE FIRST HOUR: CPT | Mod: GC | Performed by: INTERNAL MEDICINE

## 2022-05-19 PROCEDURE — 80053 COMPREHEN METABOLIC PANEL: CPT | Performed by: INTERNAL MEDICINE

## 2022-05-19 PROCEDURE — 74176 CT ABD & PELVIS W/O CONTRAST: CPT | Mod: 26 | Performed by: RADIOLOGY

## 2022-05-19 PROCEDURE — 250N000013 HC RX MED GY IP 250 OP 250 PS 637: Performed by: INTERNAL MEDICINE

## 2022-05-19 PROCEDURE — 71046 X-RAY EXAM CHEST 2 VIEWS: CPT | Mod: 26 | Performed by: RADIOLOGY

## 2022-05-19 PROCEDURE — 82272 OCCULT BLD FECES 1-3 TESTS: CPT | Performed by: STUDENT IN AN ORGANIZED HEALTH CARE EDUCATION/TRAINING PROGRAM

## 2022-05-19 RX ORDER — POTASSIUM CHLORIDE 29.8 MG/ML
20 INJECTION INTRAVENOUS ONCE
Status: COMPLETED | OUTPATIENT
Start: 2022-05-19 | End: 2022-05-19

## 2022-05-19 RX ORDER — POTASSIUM CHLORIDE 1.5 G/1.58G
20 POWDER, FOR SOLUTION ORAL ONCE
Status: COMPLETED | OUTPATIENT
Start: 2022-05-19 | End: 2022-05-19

## 2022-05-19 RX ORDER — FUROSEMIDE 10 MG/ML
40 INJECTION INTRAMUSCULAR; INTRAVENOUS ONCE
Status: COMPLETED | OUTPATIENT
Start: 2022-05-19 | End: 2022-05-19

## 2022-05-19 RX ADMIN — FAMOTIDINE 20 MG: 20 TABLET ORAL at 19:42

## 2022-05-19 RX ADMIN — FAMOTIDINE 20 MG: 20 TABLET ORAL at 07:33

## 2022-05-19 RX ADMIN — HYDROXYCHLOROQUINE SULFATE 200 MG: 200 TABLET, FILM COATED ORAL at 19:42

## 2022-05-19 RX ADMIN — FERROUS SULFATE TAB 325 MG (65 MG ELEMENTAL FE) 325 MG: 325 (65 FE) TAB at 11:38

## 2022-05-19 RX ADMIN — Medication 5 MG: at 21:14

## 2022-05-19 RX ADMIN — Medication 3.12 MG: at 03:34

## 2022-05-19 RX ADMIN — FUROSEMIDE 40 MG: 10 INJECTION, SOLUTION INTRAVENOUS at 09:39

## 2022-05-19 RX ADMIN — HYDROXYZINE HYDROCHLORIDE 50 MG: 50 TABLET ORAL at 03:03

## 2022-05-19 RX ADMIN — ENOXAPARIN SODIUM 70 MG: 80 INJECTION SUBCUTANEOUS at 19:42

## 2022-05-19 RX ADMIN — HYDROXYZINE HYDROCHLORIDE 50 MG: 50 TABLET ORAL at 19:42

## 2022-05-19 RX ADMIN — POTASSIUM CHLORIDE 20 MEQ: 1.5 POWDER, FOR SOLUTION ORAL at 15:57

## 2022-05-19 RX ADMIN — TAMSULOSIN HYDROCHLORIDE 0.4 MG: 0.4 CAPSULE ORAL at 07:33

## 2022-05-19 RX ADMIN — Medication 6.25 MG: at 19:42

## 2022-05-19 RX ADMIN — POTASSIUM CHLORIDE 20 MEQ: 750 TABLET, EXTENDED RELEASE ORAL at 19:43

## 2022-05-19 RX ADMIN — Medication 1 TABLET: at 11:38

## 2022-05-19 RX ADMIN — POTASSIUM CHLORIDE 20 MEQ: 29.8 INJECTION, SOLUTION INTRAVENOUS at 04:31

## 2022-05-19 RX ADMIN — HYDROXYZINE HYDROCHLORIDE 50 MG: 50 TABLET ORAL at 10:28

## 2022-05-19 RX ADMIN — POTASSIUM CHLORIDE 20 MEQ: 750 TABLET, EXTENDED RELEASE ORAL at 07:34

## 2022-05-19 RX ADMIN — LORAZEPAM 1 MG: 0.5 TABLET ORAL at 14:39

## 2022-05-19 RX ADMIN — LORAZEPAM 1 MG: 0.5 TABLET ORAL at 21:15

## 2022-05-19 RX ADMIN — HYDROXYCHLOROQUINE SULFATE 200 MG: 200 TABLET, FILM COATED ORAL at 07:34

## 2022-05-19 RX ADMIN — ENOXAPARIN SODIUM 70 MG: 80 INJECTION SUBCUTANEOUS at 07:35

## 2022-05-19 RX ADMIN — ZOLPIDEM TARTRATE 5 MG: 5 TABLET, FILM COATED ORAL at 04:31

## 2022-05-19 RX ADMIN — ASPIRIN 81 MG: 81 TABLET, COATED ORAL at 07:33

## 2022-05-19 RX ADMIN — Medication 6.25 MG: at 11:38

## 2022-05-19 RX ADMIN — LORAZEPAM 1 MG: 0.5 TABLET ORAL at 07:33

## 2022-05-19 ASSESSMENT — ACTIVITIES OF DAILY LIVING (ADL)
ADLS_ACUITY_SCORE: 21

## 2022-05-19 NOTE — PROGRESS NOTES
[facility]    Cardiology Progress Note- Cards 2      Date of Admission:  5/16/2022     Assessment & Plan: HVSL   Dandy EDURAD Sands is a 61 year old male with history of CAD s/p remote PCI to LAD, ICM LVEF 30% s/p CRT-D, severe MR, APL with hx of DVT/PE on chronic anticoagulation with warfarin, hx of COVID-19 1/2022 (was hospitalized, not intubated), HTN, and HLD who was admitted to [facility] 5/16/2022 as a transfer from [facility] in [place] for evaluation of multivessel coronary artery disease and moderate-severe functional mitral regurgitation. Found to have dilated LV with LVEF 10-20% making him high-risk surgical candidate. Currently undergoing LVAD/Transplant work up.    Today 5/18:   -Start transplant/LVAD evaluation work up  -Diuresis with Lasix 40 IV x 1  -Increase captopril to 6.25mg TID. Attempting to go up on afterload reduction with captopril in attempt to wean off nitroprusside     Neuro  # Anxiety  # Insomnia  Continues to report severe anxiety.  - PTA lorazepam 0.5-1 mg q6h PRN (pta q4h prn)  - PTA zolpidem 5mg at bedtime prn  - Hydroxyzine prn for anxiety  - Melatonin scheduled  - Psych consulted    Cardiac  # Longstanding HFrEF Secondary to ICM (LVEF 10-20%) NYHA III; ACC/AHA Stage D; SCAI Stage C  # CAD s/p PCI to LAD (2005)  # Hx of MI (2007)  # Severe ostial LAD disease with in-stent restenosis of mid LAD at diag bifurcation, severe proximal RCA disease, mild circ disease  # S/p CRT-D (Medtronic 2006, gen change 2012)  # Severe Functional MR  # Dilated LV (LVIDd 6.1 cm)  Transferred from [facility] in [place] 5/16/2022 for evaluation for CABG + MVR at a center with ECMO/VAD as a back up. However at the OSH, his EF was reported 30-35%, and EF here is 10-20% (on TTE and CMRI) with severely dilated LV. CMRI showing infarcted myocardium in LAD territory. Given the degree of LV dysfunction/dilation, moderate to severe functional MR and  lack of viable myocardium, cardiac surgery would be high risk. Will proceed with LVAD/Transplant work up. Hopefully will have a decision on treatment plan by sometime next week  - ACEI/ARB/ARNI- Captopril 6.25mg TID for afterload reduction  - BB- Hold pta metoprolol succinate 12.5mg at bedtime in setting of cardiogenic shock  - Aldosterone receptor antagonist- Hold off  - SGLT2i- Hold off  - Antiplatelet- continue pta aspirin 81mg daily, holding pta Plavix 75 mg daily  - Statin- continue pta atorvastatin 40mg daily (holding in the setting of liver injury)  - Volume status: hypervolemic but reaching euvolemia  - Diuretic- Lasix 40 IV x 1  - SCD- S/p CRT-D (Medtronic 2006, gen change 2012)  - OT consult  - Nutrition consult  - Strict I&Os  - Daily weights  - Telemetry    LVAD/Transplant Evaluation Checklist  [] labs (CBC, CMP, PT/INR, cystatin C, prealbumin, UA + micro)  [] Infectious (Hep A/B/C, HIV, treponema, HSV 1/2 IgG, CMV IgG, Toxo IgG, EBV IgG, varicella IgG, Quant gold, COVID vaccine/PCR)  [] Utox/nicotine and cotinine/PeTH   [] Immunocompatibility (last transfusion, ABO, HLA tissue typing, PRA)  [x] ECG, Echo  [] CPX  [] 6MWT  [x] RHC  [x] CVTS consult  [x] Social work consult  [] Palliative care consult  [x] Neuropsych consult  [] Nutrition consult  [x] CT Dental   [x] Dental consult  [] Abd US + doppler  [] Extremity US and ABIs  [x] Carotid US (if DM or ICM or >49yo)  [] PFTs  [] Dexa  [] CT head non-contrast  [] CT CAP non-contrast  [] colonoscopy (>51 yo)  [] PSA/HCG    Resp  #Mild-Moderate Emphysema  #Hx of COVID-19  Had COVID ~1/2022, was hospitalized for ~6 days, was on BiPap for a little, was not intubated.  Currently no issues. Former smoker 2 ppd for 40 years quit on 09/09/09.   -On minimal oxygen requirements (1-3 L nasal cannula)    GI  # Liver injury, mixed hepatocellular & cholestatic, acute  #GERD  Noted 5/17. Likely in setting of hepatic congestion. Diuresis as above.   - Follow CMP q12h  -  holding statin as above  - Obtaining US abdomen as part of transplant work up  - Continue pta famotidine 20mg bid     Renal  #BPH  - Continue pta tamsulosin 0.4mg daily    Rheum  # SLE  # APL  # Hx of DVT and PE  PTA on warfarin (he took 6.5mg daily). Transitioned to Lovenox at OSH in anticipation of surgery. He reports that his main symptom of lupus is diffuse joint pains that is tolerable with Tylenol.   - Rheumatology consulted, as above   - Lovenox 70mg q12h  - Continue pta hydroxychloroquine 200mg bid   - Holding pta mycophenolic acid 1500mg q12h per Rheumatology recs in the event he has surgery (In general for patients with compensated lupus undergoing elective surgery we recommend holding MMF one week prior to surgery and re-starting 3-5 days after surgery in the absence of surgical site infection, poor wound healing or infection elsewhere.)  - Tylenol prn for pain     Heme  #Anemia, chronic  - Continue pta ferrous sulfate 325mg daily     ID  No active signs of infection         Diet:   2g Na diet, 2L fluid restriction  DVT Prophylaxis: Lovenox  Fitzgerald Catheter: Not present  Code Status: Full      Disposition Plan       Entered: Chris Lawrence MD 05/19/2022, 4:07 PM       The patient's care was discussed with the Attending, Dr Stewart.    Chris Lawrence MD  Cardiology Fellow    ______________________________________________________________________    Interval History   Transferred to ICU yesterday after RHC with trip and on nipride. Doing well overnight and in good spirits in AM.    Data reviewed today: I reviewed all medications, new labs and imaging results over the last 24 hours.     Physical Exam   Vital Signs: Temp: 97.6  F (36.4  C) Temp src: Axillary BP: (!) 89/66 Pulse: 89   Resp: (!) 35 SpO2: 97 %   Oxygen Delivery: 1 LPM  Weight: 158 lbs 1.12 oz  General: sitting at side of bed, NAD  HEENT: no scleral icterus or conjunctival injection, oropharynx clear  Resp: CTAB, no wheezing  Cardiac: regular rate and  rhythm, 3/6 systolic murmur at apex. [place] in R IJ  Abdomen: soft, non-tender, non-distended. No rebound or guarding.  Extremities: warm, no lower extremity edema  MSK: some reduced muscle bulk  Neuro: alert, oriented x4. No focal neuro deficits.  Psych: appropriate mood and affect     Data   Recent Labs   Lab 05/19/22  1248 05/19/22  0342 05/19/22  0012 05/18/22  1749 05/18/22  1748 05/18/22  1626 05/18/22  1526 05/18/22  1503 05/18/22  1502 05/18/22  0804 05/18/22  0636 05/17/22  1147 05/17/22  0556   WBC  --  5.7  --   --   --   --   --   --   --  9.7  --   --  10.9   HGB  --  8.5*  --   --   --   --  8.4* 8.9*   < > 8.7*  --   --  8.5*   MCV  --  92  --   --   --   --   --   --   --  92  --   --  92   PLT  --  274  --   --   --   --   --   --   --  358  --   --  364   INR  --  1.29*  --   --   --   --   --   --   --   --  1.36*  --  1.30*   NA  --  139  --  136  --   --   --   --   --   --  138   < > 138   POTASSIUM 3.7 3.6 4.2 3.4  --   --   --   --   --   --  3.6   < > 3.9   CHLORIDE  --  106  --  104  --   --   --   --   --   --  104   < > 106   CO2  --  23  --  23  --   --   --   --   --   --  22   < > 21   BUN  --  19  --  24  --   --   --   --   --   --  28   < > 29   CR  --  0.96  --  1.06  --   --   --   --   --   --  1.20   < > 0.97   ANIONGAP  --  10  --  9  --   --   --   --   --   --  12   < > 11   ELDA  --  8.7  --  8.8  --   --   --   --   --   --  9.2   < > 9.3   GLC  --  81  --  98 98   < >  --   --   --   --  124*   < > 114*   ALBUMIN  --  3.2*  --  3.3*  --   --   --   --   --   --  3.5   < > 3.2*   PROTTOTAL  --  6.6*  --  7.0  --   --   --   --   --   --  7.5   < > 6.9   BILITOTAL  --  0.7  --  0.6  --   --   --   --   --   --  0.7   < > 0.4   ALKPHOS  --  74  --  78  --   --   --   --   --   --  86   < > 88   ALT  --  213*  --  185*  --   --   --   --   --   --  158*   < > 137*   AST  --  94*  --  85*  --   --   --   --   --   --  74*   < > 104*    < > = values in this interval not displayed.      Recent Results (from the past 24 hour(s))   XR Chest Port 1 View    Narrative    EXAM: XR CHEST PORT 1 VIEW  5/18/2022 4:24 PM     HISTORY:  [place] placement       COMPARISON:  CT chest and Chest radiograph 5/16/2022    FINDINGS:   Portable upright view of the chest. Interval placement of [place]-Janae  catheter with tip terminating over the right main pulmonary artery.  Otherwise, stable exam. Implantable cardiac defibrillator.      Impression    IMPRESSION: [place]-Janae catheter terminating over the right main  pulmonary artery.    I have personally reviewed the examination and initial interpretation  and I agree with the findings.    CONSTANTINO OWEN MD         SYSTEM ID:  [id number]   XR Chest Port 1 View    Narrative    EXAMINATION:  XR CHEST PORT 1 VIEW 5/19/2022 12:47 AM.    COMPARISON: 5/18/2022    HISTORY:  [place] Placement    FINDINGS: Frontal view. [place]-Janae catheter tip projects over the  central right pulmonary artery. Unchanged pacemaker. Cardiac  silhouette unchanged. No large pleural effusion or pneumothorax.  Decreased patchy bilateral pulmonary opacities.      Impression    IMPRESSION: [place]-Janae catheter tip projects over the central right  pulmonary artery.    I have personally reviewed the examination and initial interpretation  and I agree with the findings.    RAHEEM MEAD MD         SYSTEM ID:  [id number]

## 2022-05-19 NOTE — PLAN OF CARE
Shift Summary [id number]: No acute events this shift. Pt starting the workup for potential procedure, Xray, CT, and multiple labs completed. [place] still in place, AZUL q6h. VSS on RA. Nipride gtt running at 0.5 for MAP 65-75. Prns given for anxiety (see MAR). No other concerns.

## 2022-05-19 NOTE — PROGRESS NOTES
Dandy Shavon had a dental consultation along with imaging completed on 5/17/22, after which he was cleared dentally for any future medical procedures, including possible valve replacement, heart transplant, or LVAD evaluation.     Quincy Lawrence DMD   Dental, PGY-1   Pager: [phone]

## 2022-05-19 NOTE — PLAN OF CARE
Shift Summary [id number]    Major Shift Events:    No acute changes during shift. Pt remains A/Ox4 but anxious. RA tolerating well. NSR. Nipride on/off t/o night for BP management. AZUL #'s see flowsheets. Voids to urinal. No BM. No new skin issues present during shift    Potassium replaced per protocol x2    Plan: continue current    For vital signs and complete assessments, please see documentation flowsheets.

## 2022-05-20 ENCOUNTER — APPOINTMENT (OUTPATIENT)
Dept: GENERAL RADIOLOGY | Facility: CLINIC | Age: 62
DRG: 246 | End: 2022-05-20
Attending: INTERNAL MEDICINE
Payer: COMMERCIAL

## 2022-05-20 ENCOUNTER — APPOINTMENT (OUTPATIENT)
Dept: ULTRASOUND IMAGING | Facility: CLINIC | Age: 62
DRG: 246 | End: 2022-05-20
Attending: INTERNAL MEDICINE
Payer: COMMERCIAL

## 2022-05-20 LAB
ALBUMIN SERPL-MCNC: 3.1 G/DL (ref 3.4–5)
ALBUMIN SERPL-MCNC: 3.2 G/DL (ref 3.4–5)
ALP SERPL-CCNC: 71 U/L (ref 40–150)
ALP SERPL-CCNC: 71 U/L (ref 40–150)
ALT SERPL W P-5'-P-CCNC: 130 U/L (ref 0–70)
ALT SERPL W P-5'-P-CCNC: 151 U/L (ref 0–70)
ANION GAP SERPL CALCULATED.3IONS-SCNC: 9 MMOL/L (ref 3–14)
ANION GAP SERPL CALCULATED.3IONS-SCNC: 9 MMOL/L (ref 3–14)
AST SERPL W P-5'-P-CCNC: 26 U/L (ref 0–45)
AST SERPL W P-5'-P-CCNC: 40 U/L (ref 0–45)
BASE EXCESS BLDV CALC-SCNC: -0.4 MMOL/L (ref -7.7–1.9)
BASE EXCESS BLDV CALC-SCNC: -1.5 MMOL/L (ref -7.7–1.9)
BASE EXCESS BLDV CALC-SCNC: 0.2 MMOL/L (ref -7.7–1.9)
BASE EXCESS BLDV CALC-SCNC: 0.2 MMOL/L (ref -7.7–1.9)
BASOPHILS # BLD AUTO: 0 10E3/UL (ref 0–0.2)
BASOPHILS NFR BLD AUTO: 0 %
BILIRUB SERPL-MCNC: 0.5 MG/DL (ref 0.2–1.3)
BILIRUB SERPL-MCNC: 0.5 MG/DL (ref 0.2–1.3)
BUN SERPL-MCNC: 13 MG/DL (ref 7–30)
BUN SERPL-MCNC: 16 MG/DL (ref 7–30)
CALCIUM SERPL-MCNC: 8.4 MG/DL (ref 8.5–10.1)
CALCIUM SERPL-MCNC: 8.9 MG/DL (ref 8.5–10.1)
CHLORIDE BLD-SCNC: 105 MMOL/L (ref 94–109)
CHLORIDE BLD-SCNC: 105 MMOL/L (ref 94–109)
CHOLEST SERPL-MCNC: 74 MG/DL
CMV IGG SERPL IA-ACNC: >10 U/ML
CMV IGG SERPL IA-ACNC: ABNORMAL
CO2 SERPL-SCNC: 22 MMOL/L (ref 20–32)
CO2 SERPL-SCNC: 24 MMOL/L (ref 20–32)
CREAT SERPL-MCNC: 0.87 MG/DL (ref 0.66–1.25)
CREAT SERPL-MCNC: 0.92 MG/DL (ref 0.66–1.25)
DRVVT CONFIRM NORMALIZED RATIO: 1.45
DRVVT SCREEN RATIO: 1.52
DSDNA AB SER-ACNC: 22 IU/ML
EBV VCA IGG SER IA-ACNC: 62.2 U/ML
EBV VCA IGG SER IA-ACNC: POSITIVE
EOSINOPHIL # BLD AUTO: 0.1 10E3/UL (ref 0–0.7)
EOSINOPHIL NFR BLD AUTO: 1 %
ERYTHROCYTE [DISTWIDTH] IN BLOOD BY AUTOMATED COUNT: 18.8 % (ref 10–15)
FERRITIN SERPL-MCNC: 406 NG/ML (ref 26–388)
GFR SERPL CREATININE-BSD FRML MDRD: >90 ML/MIN/1.73M2
GFR SERPL CREATININE-BSD FRML MDRD: >90 ML/MIN/1.73M2
GLUCOSE BLD-MCNC: 87 MG/DL (ref 70–99)
GLUCOSE BLD-MCNC: 96 MG/DL (ref 70–99)
HAV IGG SER QL IA: NONREACTIVE
HBA1C MFR BLD: 5.5 % (ref 0–5.6)
HBV CORE AB SERPL QL IA: NONREACTIVE
HBV SURFACE AB SERPL IA-ACNC: 0 M[IU]/ML
HBV SURFACE AG SERPL QL IA: NONREACTIVE
HCO3 BLDV-SCNC: 22 MMOL/L (ref 21–28)
HCO3 BLDV-SCNC: 23 MMOL/L (ref 21–28)
HCO3 BLDV-SCNC: 24 MMOL/L (ref 21–28)
HCO3 BLDV-SCNC: 24 MMOL/L (ref 21–28)
HCT VFR BLD AUTO: 27.4 % (ref 40–53)
HCV AB SERPL QL IA: NONREACTIVE
HDLC SERPL-MCNC: 30 MG/DL
HGB BLD-MCNC: 8.6 G/DL (ref 13.3–17.7)
HGB FREE PLAS-MCNC: <30 MG/DL
HIV 1+2 AB+HIV1 P24 AG SERPL QL IA: NONREACTIVE
HSV1 IGG SERPL QL IA: 0.21 INDEX
HSV1 IGG SERPL QL IA: NORMAL
HSV2 IGG SERPL QL IA: 0.25 INDEX
HSV2 IGG SERPL QL IA: NORMAL
IMM GRANULOCYTES # BLD: 0.1 10E3/UL
IMM GRANULOCYTES NFR BLD: 1 %
INR PPP: 1.24 (ref 0.85–1.15)
IRON SATN MFR SERPL: 15 % (ref 15–46)
IRON SERPL-MCNC: 37 UG/DL (ref 35–180)
LA PPP-IMP: POSITIVE
LDLC SERPL CALC-MCNC: 27 MG/DL
LUPUS INTERPRETATION: ABNORMAL
LYMPHOCYTES # BLD AUTO: 0.7 10E3/UL (ref 0.8–5.3)
LYMPHOCYTES NFR BLD AUTO: 13 %
MAGNESIUM SERPL-MCNC: 2.1 MG/DL (ref 1.6–2.3)
MCH RBC QN AUTO: 28.8 PG (ref 26.5–33)
MCHC RBC AUTO-ENTMCNC: 31.4 G/DL (ref 31.5–36.5)
MCV RBC AUTO: 92 FL (ref 78–100)
MONOCYTES # BLD AUTO: 0.6 10E3/UL (ref 0–1.3)
MONOCYTES NFR BLD AUTO: 10 %
NEUTROPHILS # BLD AUTO: 4.1 10E3/UL (ref 1.6–8.3)
NEUTROPHILS NFR BLD AUTO: 75 %
NONHDLC SERPL-MCNC: 44 MG/DL
NRBC # BLD AUTO: 0 10E3/UL
NRBC BLD AUTO-RTO: 0 /100
NT-PROBNP SERPL-MCNC: 2482 PG/ML (ref 0–900)
O2/TOTAL GAS SETTING VFR VENT: 0 %
O2/TOTAL GAS SETTING VFR VENT: 21 %
OXYHGB MFR BLDV: 43 % (ref 70–75)
OXYHGB MFR BLDV: 45 % (ref 70–75)
OXYHGB MFR BLDV: 48 % (ref 70–75)
OXYHGB MFR BLDV: 53 % (ref 70–75)
PCO2 BLDV: 33 MM HG (ref 40–50)
PCO2 BLDV: 34 MM HG (ref 40–50)
PCO2 BLDV: 34 MM HG (ref 40–50)
PCO2 BLDV: 35 MM HG (ref 40–50)
PH BLDV: 7.44 [PH] (ref 7.32–7.43)
PH BLDV: 7.45 [PH] (ref 7.32–7.43)
PLATELET # BLD AUTO: 268 10E3/UL (ref 150–450)
PLATELET NEUTRALIZATION: -1 SECONDS
PO2 BLDV: 27 MM HG (ref 25–47)
PO2 BLDV: 28 MM HG (ref 25–47)
PO2 BLDV: 29 MM HG (ref 25–47)
PO2 BLDV: 31 MM HG (ref 25–47)
POTASSIUM BLD-SCNC: 4.2 MMOL/L (ref 3.4–5.3)
POTASSIUM BLD-SCNC: 4.3 MMOL/L (ref 3.4–5.3)
PROT SERPL-MCNC: 6 G/DL (ref 6.8–8.8)
PROT SERPL-MCNC: 6.6 G/DL (ref 6.8–8.8)
PSA SERPL-MCNC: 0.88 UG/L (ref 0–4)
PTT RATIO: 2.3
RBC # BLD AUTO: 2.99 10E6/UL (ref 4.4–5.9)
SODIUM SERPL-SCNC: 136 MMOL/L (ref 133–144)
SODIUM SERPL-SCNC: 138 MMOL/L (ref 133–144)
T GONDII IGG SER QL: <3 IU/ML (ref 0–7.1)
T PALLIDUM AB SER QL: NONREACTIVE
THROMBIN TIME: 18.3 SECONDS (ref 13–19)
TIBC SERPL-MCNC: 246 UG/DL (ref 240–430)
TRANSFERRIN SERPL-MCNC: 191 MG/DL (ref 210–360)
TRIGL SERPL-MCNC: 84 MG/DL
TSH SERPL DL<=0.005 MIU/L-ACNC: 1.65 MU/L (ref 0.4–4)
VZV IGG SER QL IA: >4000 INDEX
VZV IGG SER QL IA: POSITIVE
WBC # BLD AUTO: 5.5 10E3/UL (ref 4–11)

## 2022-05-20 PROCEDURE — 93970 EXTREMITY STUDY: CPT | Mod: 26 | Performed by: RADIOLOGY

## 2022-05-20 PROCEDURE — 250N000013 HC RX MED GY IP 250 OP 250 PS 637: Performed by: STUDENT IN AN ORGANIZED HEALTH CARE EDUCATION/TRAINING PROGRAM

## 2022-05-20 PROCEDURE — 999N000065 XR CHEST PORT 1 VIEW

## 2022-05-20 PROCEDURE — 83880 ASSAY OF NATRIURETIC PEPTIDE: CPT | Performed by: STUDENT IN AN ORGANIZED HEALTH CARE EDUCATION/TRAINING PROGRAM

## 2022-05-20 PROCEDURE — 93925 LOWER EXTREMITY STUDY: CPT | Mod: 26 | Performed by: RADIOLOGY

## 2022-05-20 PROCEDURE — 82805 BLOOD GASES W/O2 SATURATION: CPT | Performed by: STUDENT IN AN ORGANIZED HEALTH CARE EDUCATION/TRAINING PROGRAM

## 2022-05-20 PROCEDURE — 80061 LIPID PANEL: CPT | Performed by: STUDENT IN AN ORGANIZED HEALTH CARE EDUCATION/TRAINING PROGRAM

## 2022-05-20 PROCEDURE — 86481 TB AG RESPONSE T-CELL SUSP: CPT | Performed by: STUDENT IN AN ORGANIZED HEALTH CARE EDUCATION/TRAINING PROGRAM

## 2022-05-20 PROCEDURE — 93930 UPPER EXTREMITY STUDY: CPT | Mod: 26 | Performed by: RADIOLOGY

## 2022-05-20 PROCEDURE — 86777 TOXOPLASMA ANTIBODY: CPT | Performed by: STUDENT IN AN ORGANIZED HEALTH CARE EDUCATION/TRAINING PROGRAM

## 2022-05-20 PROCEDURE — 86704 HEP B CORE ANTIBODY TOTAL: CPT | Performed by: STUDENT IN AN ORGANIZED HEALTH CARE EDUCATION/TRAINING PROGRAM

## 2022-05-20 PROCEDURE — 85610 PROTHROMBIN TIME: CPT

## 2022-05-20 PROCEDURE — 93922 UPR/L XTREMITY ART 2 LEVELS: CPT | Mod: 26 | Performed by: RADIOLOGY

## 2022-05-20 PROCEDURE — 71045 X-RAY EXAM CHEST 1 VIEW: CPT | Mod: 26 | Performed by: RADIOLOGY

## 2022-05-20 PROCEDURE — 86803 HEPATITIS C AB TEST: CPT | Performed by: STUDENT IN AN ORGANIZED HEALTH CARE EDUCATION/TRAINING PROGRAM

## 2022-05-20 PROCEDURE — 999N000248 HC STATISTIC IV INSERT WITH US BY RN

## 2022-05-20 PROCEDURE — 93930 UPPER EXTREMITY STUDY: CPT

## 2022-05-20 PROCEDURE — 86825 HLA X-MATH NON-CYTOTOXIC: CPT | Performed by: STUDENT IN AN ORGANIZED HEALTH CARE EDUCATION/TRAINING PROGRAM

## 2022-05-20 PROCEDURE — 76700 US EXAM ABDOM COMPLETE: CPT

## 2022-05-20 PROCEDURE — 85025 COMPLETE CBC W/AUTO DIFF WBC: CPT

## 2022-05-20 PROCEDURE — 250N000013 HC RX MED GY IP 250 OP 250 PS 637

## 2022-05-20 PROCEDURE — 83036 HEMOGLOBIN GLYCOSYLATED A1C: CPT | Performed by: STUDENT IN AN ORGANIZED HEALTH CARE EDUCATION/TRAINING PROGRAM

## 2022-05-20 PROCEDURE — 76700 US EXAM ABDOM COMPLETE: CPT | Mod: 26 | Performed by: RADIOLOGY

## 2022-05-20 PROCEDURE — 71045 X-RAY EXAM CHEST 1 VIEW: CPT | Mod: 26 | Performed by: STUDENT IN AN ORGANIZED HEALTH CARE EDUCATION/TRAINING PROGRAM

## 2022-05-20 PROCEDURE — 82728 ASSAY OF FERRITIN: CPT | Performed by: STUDENT IN AN ORGANIZED HEALTH CARE EDUCATION/TRAINING PROGRAM

## 2022-05-20 PROCEDURE — 93925 LOWER EXTREMITY STUDY: CPT

## 2022-05-20 PROCEDURE — 80321 ALCOHOLS BIOMARKERS 1OR 2: CPT | Performed by: STUDENT IN AN ORGANIZED HEALTH CARE EDUCATION/TRAINING PROGRAM

## 2022-05-20 PROCEDURE — 84466 ASSAY OF TRANSFERRIN: CPT | Performed by: STUDENT IN AN ORGANIZED HEALTH CARE EDUCATION/TRAINING PROGRAM

## 2022-05-20 PROCEDURE — 250N000011 HC RX IP 250 OP 636

## 2022-05-20 PROCEDURE — 86696 HERPES SIMPLEX TYPE 2 TEST: CPT | Performed by: STUDENT IN AN ORGANIZED HEALTH CARE EDUCATION/TRAINING PROGRAM

## 2022-05-20 PROCEDURE — 80053 COMPREHEN METABOLIC PANEL: CPT | Performed by: STUDENT IN AN ORGANIZED HEALTH CARE EDUCATION/TRAINING PROGRAM

## 2022-05-20 PROCEDURE — G0103 PSA SCREENING: HCPCS | Performed by: STUDENT IN AN ORGANIZED HEALTH CARE EDUCATION/TRAINING PROGRAM

## 2022-05-20 PROCEDURE — 84443 ASSAY THYROID STIM HORMONE: CPT | Performed by: STUDENT IN AN ORGANIZED HEALTH CARE EDUCATION/TRAINING PROGRAM

## 2022-05-20 PROCEDURE — 258N000003 HC RX IP 258 OP 636: Performed by: STUDENT IN AN ORGANIZED HEALTH CARE EDUCATION/TRAINING PROGRAM

## 2022-05-20 PROCEDURE — 200N000002 HC R&B ICU UMMC

## 2022-05-20 PROCEDURE — 86780 TREPONEMA PALLIDUM: CPT | Performed by: STUDENT IN AN ORGANIZED HEALTH CARE EDUCATION/TRAINING PROGRAM

## 2022-05-20 PROCEDURE — 93922 UPR/L XTREMITY ART 2 LEVELS: CPT

## 2022-05-20 PROCEDURE — 250N000009 HC RX 250: Performed by: STUDENT IN AN ORGANIZED HEALTH CARE EDUCATION/TRAINING PROGRAM

## 2022-05-20 PROCEDURE — 86665 EPSTEIN-BARR CAPSID VCA: CPT | Performed by: STUDENT IN AN ORGANIZED HEALTH CARE EDUCATION/TRAINING PROGRAM

## 2022-05-20 PROCEDURE — 93970 EXTREMITY STUDY: CPT

## 2022-05-20 PROCEDURE — 87340 HEPATITIS B SURFACE AG IA: CPT | Performed by: STUDENT IN AN ORGANIZED HEALTH CARE EDUCATION/TRAINING PROGRAM

## 2022-05-20 PROCEDURE — 83051 HEMOGLOBIN PLASMA: CPT | Performed by: STUDENT IN AN ORGANIZED HEALTH CARE EDUCATION/TRAINING PROGRAM

## 2022-05-20 PROCEDURE — 83550 IRON BINDING TEST: CPT | Performed by: STUDENT IN AN ORGANIZED HEALTH CARE EDUCATION/TRAINING PROGRAM

## 2022-05-20 PROCEDURE — 86708 HEPATITIS A ANTIBODY: CPT | Performed by: STUDENT IN AN ORGANIZED HEALTH CARE EDUCATION/TRAINING PROGRAM

## 2022-05-20 PROCEDURE — 80307 DRUG TEST PRSMV CHEM ANLYZR: CPT | Performed by: STUDENT IN AN ORGANIZED HEALTH CARE EDUCATION/TRAINING PROGRAM

## 2022-05-20 PROCEDURE — 83735 ASSAY OF MAGNESIUM: CPT

## 2022-05-20 PROCEDURE — 999N000147 HC STATISTIC PT IP EVAL DEFER

## 2022-05-20 PROCEDURE — 86644 CMV ANTIBODY: CPT | Performed by: STUDENT IN AN ORGANIZED HEALTH CARE EDUCATION/TRAINING PROGRAM

## 2022-05-20 PROCEDURE — 81382 HLA II TYPING 1 LOC HR: CPT | Performed by: STUDENT IN AN ORGANIZED HEALTH CARE EDUCATION/TRAINING PROGRAM

## 2022-05-20 PROCEDURE — 86832 HLA CLASS I HIGH DEFIN QUAL: CPT | Performed by: STUDENT IN AN ORGANIZED HEALTH CARE EDUCATION/TRAINING PROGRAM

## 2022-05-20 PROCEDURE — 86706 HEP B SURFACE ANTIBODY: CPT | Performed by: STUDENT IN AN ORGANIZED HEALTH CARE EDUCATION/TRAINING PROGRAM

## 2022-05-20 PROCEDURE — 86787 VARICELLA-ZOSTER ANTIBODY: CPT | Performed by: STUDENT IN AN ORGANIZED HEALTH CARE EDUCATION/TRAINING PROGRAM

## 2022-05-20 PROCEDURE — 82610 CYSTATIN C: CPT | Performed by: STUDENT IN AN ORGANIZED HEALTH CARE EDUCATION/TRAINING PROGRAM

## 2022-05-20 PROCEDURE — 71045 X-RAY EXAM CHEST 1 VIEW: CPT

## 2022-05-20 PROCEDURE — 85390 FIBRINOLYSINS SCREEN I&R: CPT | Mod: 26 | Performed by: PATHOLOGY

## 2022-05-20 PROCEDURE — 99291 CRITICAL CARE FIRST HOUR: CPT | Mod: GC | Performed by: INTERNAL MEDICINE

## 2022-05-20 PROCEDURE — 85613 RUSSELL VIPER VENOM DILUTED: CPT | Performed by: STUDENT IN AN ORGANIZED HEALTH CARE EDUCATION/TRAINING PROGRAM

## 2022-05-20 PROCEDURE — 86833 HLA CLASS II HIGH DEFIN QUAL: CPT | Performed by: STUDENT IN AN ORGANIZED HEALTH CARE EDUCATION/TRAINING PROGRAM

## 2022-05-20 RX ORDER — CAPTOPRIL 12.5 MG/1
12.5 TABLET ORAL EVERY 8 HOURS
Status: DISCONTINUED | OUTPATIENT
Start: 2022-05-20 | End: 2022-05-22

## 2022-05-20 RX ADMIN — Medication 1 TABLET: at 11:40

## 2022-05-20 RX ADMIN — FERROUS SULFATE TAB 325 MG (65 MG ELEMENTAL FE) 325 MG: 325 (65 FE) TAB at 11:40

## 2022-05-20 RX ADMIN — ENOXAPARIN SODIUM 70 MG: 80 INJECTION SUBCUTANEOUS at 09:24

## 2022-05-20 RX ADMIN — Medication 5 MG: at 20:07

## 2022-05-20 RX ADMIN — Medication 12.5 MG: at 11:40

## 2022-05-20 RX ADMIN — HYDROXYCHLOROQUINE SULFATE 200 MG: 200 TABLET, FILM COATED ORAL at 20:10

## 2022-05-20 RX ADMIN — THIAMINE HCL TAB 100 MG 100 MG: 100 TAB at 11:40

## 2022-05-20 RX ADMIN — FAMOTIDINE 20 MG: 20 TABLET ORAL at 20:10

## 2022-05-20 RX ADMIN — ACETAMINOPHEN 650 MG: 325 TABLET ORAL at 11:40

## 2022-05-20 RX ADMIN — HYDROXYZINE HYDROCHLORIDE 50 MG: 50 TABLET ORAL at 09:23

## 2022-05-20 RX ADMIN — FAMOTIDINE 20 MG: 20 TABLET ORAL at 09:13

## 2022-05-20 RX ADMIN — HYDROXYCHLOROQUINE SULFATE 200 MG: 200 TABLET, FILM COATED ORAL at 09:13

## 2022-05-20 RX ADMIN — Medication 6.25 MG: at 04:34

## 2022-05-20 RX ADMIN — ASPIRIN 81 MG: 81 TABLET, COATED ORAL at 09:13

## 2022-05-20 RX ADMIN — SODIUM NITROPRUSSIDE 0.5 MCG/KG/MIN: 25 INJECTION, SOLUTION, CONCENTRATE INTRAVENOUS at 04:34

## 2022-05-20 RX ADMIN — Medication 12.5 MG: at 20:08

## 2022-05-20 RX ADMIN — ZOLPIDEM TARTRATE 5 MG: 5 TABLET, FILM COATED ORAL at 00:11

## 2022-05-20 RX ADMIN — LORAZEPAM 1 MG: 0.5 TABLET ORAL at 04:34

## 2022-05-20 RX ADMIN — POTASSIUM CHLORIDE 20 MEQ: 750 TABLET, EXTENDED RELEASE ORAL at 09:13

## 2022-05-20 RX ADMIN — TAMSULOSIN HYDROCHLORIDE 0.4 MG: 0.4 CAPSULE ORAL at 09:13

## 2022-05-20 RX ADMIN — HYDROXYZINE HYDROCHLORIDE 50 MG: 50 TABLET ORAL at 01:56

## 2022-05-20 RX ADMIN — HYDROXYZINE HYDROCHLORIDE 50 MG: 50 TABLET ORAL at 20:08

## 2022-05-20 RX ADMIN — ENOXAPARIN SODIUM 70 MG: 80 INJECTION SUBCUTANEOUS at 20:16

## 2022-05-20 RX ADMIN — LORAZEPAM 1 MG: 0.5 TABLET ORAL at 14:31

## 2022-05-20 RX ADMIN — POTASSIUM CHLORIDE 20 MEQ: 750 TABLET, EXTENDED RELEASE ORAL at 20:10

## 2022-05-20 ASSESSMENT — ACTIVITIES OF DAILY LIVING (ADL)
ADLS_ACUITY_SCORE: 25

## 2022-05-20 NOTE — PROGRESS NOTES
CLINICAL NUTRITION SERVICES - BRIEF NOTE   (See RD note on 5/17 for full assessment)     Reason for RD note: Provider order for pre-VAD/pre-heart transplant education    New Findings/Chart Review:  Per chart review, pt was eating well throughout the last few days, but he reports that he is not hungry today. Pt reports that Ensure is giving him diarrhea. He is willing to try Gelatein+. Spoke with RN and she reports that he was upset that it took over 1 hour for meal tray to get up to his room and this is why he wouldn't eat breakfast.     Interventions:  --Ordered 100 mg thiamine daily for EF <30%  --Ordered Gelatein+ TID and discontinued Ensure  --Provided pt with a brief post-VAD diet education - small/frequent meals, need for supplements, increased protein needs, etc.   --Briefly discussed post-transplant nutrition education.   --Discussed likely need for temporary feeding tube after VAD/transplant.     Future/Additional Recommendations:  If TF becomes nutrition POC post-VAD or post-transplant, recommend: Osmolite 1.5 Virgilio @ goal of  55ml/hr (1320ml/day) + 1 pkt Prosource TID will provide: 2100 kcals (29 kcal/kg), 116 g PRO (1.6 g/kg), 1005 ml free H20, 268 g CHO, and 0 g fiber daily.    Nutrition will continue to follow per protocol.    Kelsey Prado, MS, RD, LD  4E (CVICU) RD pager: [phone]  Ascom: [phone]  Weekend/Holiday RD pager: [phone]

## 2022-05-20 NOTE — PROGRESS NOTES
RT performed bedside PFT. Values recorded are:    FVC Pred 2.75     FVC Pre 1.38   50%  FEV1 Pred 2.06   FVC Pre 1.35   66%  PEF Pred 5.08     PEF Pre 4.62   91%    All other values not recorded due to limited measuring on bedside device.     Aniceto Michael, RT on 5/19/2022 at 10:58 PM

## 2022-05-20 NOTE — PLAN OF CARE
Problem: Plan of Care - These are the overarching goals to be used throughout the patient stay.    Goal: Plan of Care Review/Shift Note  Description: The Plan of Care Review/Shift note should be completed every shift.  The Outcome Evaluation is a brief statement about your assessment that the patient is improving, declining, or no change.  This information will be displayed automatically on your shift note.  Outcome: Ongoing, Progressing   Goal Outcome Evaluation:        Major Shift Events:  Alert and oriented x4. Follows commands. Pt gets very anxious at times. Room air. SR/ST. MAPS 65-75. Nipride. Urinal. Adequate urine output. BM x1. AZUL Q6.   Plan: Continue to monitor. Notify team of any changes or concerns.   For vital signs and complete assessments, please see documentation flowsheets.

## 2022-05-20 NOTE — PROGRESS NOTES
Cardiology Progress Note       Changes Today:   - Increase captopril to 12.5mg Q8H (next dose at noon)  - Turn off nipride at 1PM, recheck numbers   - Possibly remove numbers based on Jackson  - I/O goal: net even  - Resume statin  - If nipride is off and Jackson is out, can consider transfer to floor    Physical Exam   Temp: 97.4  F (36.3  C) Temp src: Axillary BP: 99/72 Pulse: 91   Resp: 26 SpO2: 96 % O2 Device: None (Room air)      Vital Signs with Ranges  Temp:  [97.4  F (36.3  C)-97.8  F (36.6  C)] 97.4  F (36.3  C)  Pulse:  [] 91  Resp:  [11-39] 26  BP: ()/(50-77) 99/72  SpO2:  [90 %-98 %] 96 %  MAP: 62-87    DATE MAP CVP PAP PCWP Marley CO Marley CI PVR SVR MVo2 Therapies   5/20 76 4 35/19/24 15 3.6 2.0 2.5 1600 43 Nipride 0.25      MVO2: 43     Intake/Output    Intake/Output Summary (Last 24 hours) at 5/20/2022 0728  Last data filed at 5/20/2022 0700  Gross per 24 hour   Intake 1618.06 ml   Output 1925 ml   Net -306.94 ml       Weight  Vitals:    05/16/22 1207 05/17/22 0237 05/17/22 0759 05/19/22 0445   Weight: 66.8 kg (147 lb 4.3 oz) 71.7 kg (158 lb 1.6 oz) 71.5 kg (157 lb 11.2 oz) 71.7 kg (158 lb 1.1 oz)    05/20/22 0400   Weight: 71.4 kg (157 lb 6.5 oz)       Resp: 26        nitroPRUsside 0.5 mcg/kg/min (05/20/22 0955)     - MEDICATION INSTRUCTIONS -       ACE/ARB/ARNI NOT PRESCRIBED       BETA BLOCKER NOT PRESCRIBED           aspirin  81 mg Oral Daily     [Held by provider] atorvastatin  40 mg Oral Daily     captopril  6.25 mg Oral Q8H     enoxaparin ANTICOAGULANT  1 mg/kg Subcutaneous Q12H     famotidine  20 mg Oral BID     ferrous sulfate  325 mg Oral Daily with lunch     hydroxychloroquine  200 mg Oral BID     melatonin  5 mg Oral At Bedtime     multivitamin w/minerals  1 tablet Oral Daily with lunch     potassium chloride  20 mEq Oral BID     tamsulosin  0.4 mg Oral Daily        , Blood pressure 99/72, pulse 91, temperature 97.4  F (36.3  C), temperature source Axillary, resp. rate 26, height 1.715  "m (5' 7.5\"), weight 71.4 kg (157 lb 6.5 oz), SpO2 96 %.  General: sitting at side of bed, NAD  HEENT: no scleral icterus or conjunctival injection, oropharynx clear  Resp: CTAB, no wheezing  Cardiac: regular rate and rhythm, 3/6 systolic murmur at apex. [place] in R IJ  Abdomen: soft, non-tender, non-distended. No rebound or guarding.  Extremities: warm, no lower extremity edema  MSK: some reduced muscle bulk  Neuro: alert, oriented x4. No focal neuro deficits.  Psych: appropriate mood and affect    Data   Recent Labs   Lab Test 05/20/22  0516 05/19/22  1548    138   POTASSIUM 4.2 3.7   CHLORIDE 105 104   CO2 24 25   ANIONGAP 9 9   GLC 87 95   BUN 16 19   CR 0.92 0.94   ELDA 8.4* 8.7       Lab Results   Component Value Date    WBC 5.5 05/20/2022     Lab Results   Component Value Date    RBC 2.99 05/20/2022     Lab Results   Component Value Date    HGB 8.6 05/20/2022     Lab Results   Component Value Date    HCT 27.4 05/20/2022     No components found for: MCT  Lab Results   Component Value Date    MCV 92 05/20/2022     Lab Results   Component Value Date    MCH 28.8 05/20/2022     Lab Results   Component Value Date    MCHC 31.4 05/20/2022     Lab Results   Component Value Date    RDW 18.8 05/20/2022     Lab Results   Component Value Date     05/20/2022        Liver Function Studies -   Recent Labs   Lab Test 05/20/22  0516   PROTTOTAL 6.0*   ALBUMIN 3.1*   BILITOTAL 0.5   ALKPHOS 71   AST 40   *       Venous Blood Gas  Recent Labs   Lab 05/20/22  0908 05/20/22  0209 05/19/22 2016 05/19/22  1548   PHV 7.44* 7.45* 7.45* 7.46*   PCO2V 35* 34* 35* 36*   PO2V 27 31 27 29   HCO3V 24 24 24 25   RINA 0.2 0.2 0.4 1.6   O2PER 21 0 0 21       Arterial Blood Gas  Recent Labs   Lab 05/20/22  0908 05/20/22  0209 05/19/22 2016 05/19/22  1548   O2PER 21 0 0 21          Recent Results (from the past 24 hour(s))   X-ray Chest 2 vws*    Narrative    Chest 2 views    INDICATIONS: Heart transplant candidate " evaluation    COMPARISON: Single portable view earlier today    FINDINGS: Heart size appears normal. Pacer/implantable cardiac  defibrillator from left subclavian transvenous approach. Right IJ  [place]-Janae catheter tip in the right main branch pulmonary artery.  Patchy opacities in the lungs may indicate mild atelectasis subtle  edema. Bony structures appear grossly unremarkable.      Impression    IMPRESSION: Pacer/implantable cardiac defibrillator. [place]-Janae  catheter tip in right main branch pulmonary artery. Mild atelectasis  or edema in the lungs.    CONSTANTINO OWEN MD         SYSTEM ID:  [id number]   CT Chest Abdomen Pelvis w/o Contrast    Narrative    EXAMINATION: CT CHEST ABDOMEN PELVIS W/O CONTRAST, 5/19/2022 6:27 PM    TECHNIQUE: Helical CT images from the thoracic inlet through the  symphysis pubis were obtained without intravenous contrast. Contrast  dose:    COMPARISON: Chest x-ray 5/19/2022, CT chest 5/16/2022    HISTORY: Heart Transplant Candidate Evaluation    FINDINGS:  Support devices: Left chest wall cardiac defibrillator leads in the  right ET tube, right ventricle, and coronary sinus. Right IJ central  [place]-Janae catheter with tip in the proximal right pulmonary artery.    Chest: The central tracheobronchial tree is patent. Similar biapical  paraseptal emphysematous changes. No suspicious pulmonary nodules. No  focal consolidation or pleural effusion. No pneumothorax. Main  pulmonary artery mildly enlarged at 3.8 cm. No discrete right heart  enlargement.    The partially visualized thyroid is unremarkable. No abnormally  enlarged axillary, mediastinal, or hilar lymph nodes by size criteria.  The esophagus is unremarkable.    Abdomen and pelvis: Unremarkable noncontrast appearance of the liver,  gallbladder, pancreas, spleen, adrenal glands, and kidneys. Small  calcified focus within the superior aspect of the spleen, presumed  granulomatous. The urinary bladder is incompletely distended.  The  prostate is enlarged.    No abnormally dilated loops of small and large bowel. The appendix is  surgically absent. No intra-abdominal free air or free fluid. No  lymphadenopathy by size criteria.    The patency of the intra-abdominal vasculature cannot be assessed on  this noncontrast exam. Infrarenal IVC filter noted.    Bones and soft tissues: No suspicious osseous lesions.      Impression    IMPRESSION:    No acute or suspicious findings in exam. Pulmonary artery enlargement  suggesting pulmonary artery hypertension. Chronic interstitial  subpleural fibrotic disease with paraseptal emphysematous change.    I have personally reviewed the examination and initial interpretation  and I agree with the findings.    CONSTANTINO OWEN MD         SYSTEM ID:  [id number]   CT Head w/o Contrast    Narrative    CT HEAD W/O CONTRAST 5/19/2022 6:27 PM    History: Heart Transplant Candidate Evaluation   ICD-10:    Comparison: None    Technique: Using multidetector thin collimation helical acquisition  technique, axial, coronal and sagittal CT images from the skull base  to the vertex were obtained without intravenous contrast.   (topogram) image(s) also obtained and reviewed.    Findings: Moderate generalized cerebral atrophy. There is no  intracranial hemorrhage, mass effect, or midline shift. Gray/white  matter differentiation in both cerebral hemispheres is preserved.  Ventricles are proportionate to the cerebral sulci. The basal cisterns  are clear.    The bony calvaria and the bones of the skull base are normal. Mucosal  thickening of the left maxillary sinus. The remainder of the  visualized portions of the paranasal sinuses and mastoid air cells are  mostly clear.      Impression    Impression:  No acute intracranial pathology. Moderate cerebral atrophy.    I have personally reviewed the examination and initial interpretation  and I agree with the findings.    PHAM ALVAREZ MD         SYSTEM ID:  [id number]   XR  Chest Port 1 View    Narrative    EXAMINATION:  XR CHEST PORT 1 VIEW 5/20/2022 1:10 AM.    COMPARISON: 5/19/2022    HISTORY:  [place]-Janae Catheter Positioning    FINDINGS: [place]-J[place]e catheter tip projects over the main pulmonary  artery. Pacemaker unchanged. Cardiac silhouette unchanged. No large  pleural effusion. No pneumothorax. No new focal pulmonary opacity.      Impression    IMPRESSION: [place]-Janae catheter tip projects over the main pulmonary  artery.    I have personally reviewed the examination and initial interpretation  and I agree with the findings.    CONSTANTINO OWEN MD         SYSTEM ID:  [id number]       Blood culture:  No results found for this or any previous visit.   Urine culture:  No results found for this or any previous visit.    Assessment & Plan      Dandy Sands is a 61 year old male with history of CAD s/p remote PCI to LAD, ICM LVEF 30% s/p CRT-D, severe MR, APL with hx of DVT/PE on chronic anticoagulation with warfarin, hx of COVID-19 1/2022 (was hospitalized, not intubated), HTN, and HLD who was admitted to [facility]16/20[facility] as a transfer from [facility] in [place] for evaluation of multivessel coronary artery disease and moderate-severe functional mitral regurgitation. Found to have dilated LV with LVEF 10-20% making him high-risk surgical candidate. Currently undergoing LVAD/Transplant work up.     Neuro  # Anxiety  # Insomnia  - PTA lorazepam 0.5-1 mg q6h PRN (pta q4h prn)  - PTA zolpidem 5mg at bedtime prn  - Hydroxyzine prn for anxiety  - Melatonin scheduled     Cardiac  # Longstanding HFrEF Secondary to ICM (LVEF 10-20%) NYHA III; ACC/AHA Stage D; SCAI Stage C  # CAD s/p PCI to LAD (2005)  # Hx of MI (2007)  # Severe ostial LAD disease with in-stent restenosis of mid LAD at diag bifurcation, severe proximal RCA disease, mild circ disease  # S/p CRT-D (Medtronic 2006, gen change 2012)  # Severe Functional MR  # Dilated LV (LVIDd 6.1 cm)  Transferred from [facility] [place] 5/16/2022 for evaluation for CABG + MVR at a center with ECMO/VAD as a back up. However at the OSH, his EF was reported 30-35%, and EF here is 10-20% (on TTE and CMRI) with severely dilated LV. CMRI showing infarcted myocardium in LAD territory. Given the degree of LV dysfunction/dilation, moderate to severe functional MR and lack of viable myocardium, cardiac surgery would be high risk. Will proceed with LVAD/Transplant work up. Hopefully will have a decision on treatment plan by sometime next week  - ACEI/ARB/ARNI- Captopril 12.5mg Q8H for afterload reduction  - BB- Hold pta metoprolol succinate 12.5mg at bedtime in setting of cardiogenic shock  - Aldosterone receptor antagonist- Hold off  - SGLT2i- Hold off  - Afterload reduction- Plan to hold nipride and recheck numbers in afternoon. If OK, will plan to discontinue Nipride and remove swan  - Antiplatelet- continue pta aspirin 81mg daily, holding pta Plavix 75 mg daily  - Statin- continue pta atorvastatin 40mg daily (holding in the setting of liver injury)  - Volume status: Seems to be euvolemic. Plan to keep I=O (may even need to be slightly net positive)  - Diuretic- None today  - SCD- S/p CRT-D (Medtronic 2006, gen change 2012)  - OT consult  - Nutrition consult  - Strict I&Os  - Daily weights  - Telemetry     LVAD/Transplant Evaluation Checklist  []? labs (CBC, CMP, PT/INR, cystatin C, prealbumin, UA + micro)  []? Infectious (Hep A/B/C, HIV, treponema, HSV 1/2 IgG, CMV IgG, Toxo IgG, EBV IgG, varicella IgG, Quant gold, COVID vaccine/PCR)  []? Utox/nicotine and cotinine/PeTH   []? Immunocompatibility (last transfusion, ABO, HLA tissue typing, PRA)  [x]? ECG, Echo  []? CPX  []? 6MWT  [x]? RHC  [x]? CVTS consult  [x]? Social work consult  []? Palliative care consult  [x]? Neuropsych consult  []? Nutrition consult  [x]? CT Dental   [x]? Dental consult  []? Abd US + doppler  []? Extremity US and ABIs  [x]? Carotid US (if DM or ICM or >51yo)  []?  PFTs  []? Dexa  []? CT head non-contrast  []? CT CAP non-contrast  []? colonoscopy (>49 yo)  []? PSA/HCG     Resp  #Mild-Moderate Emphysema  #Hx of COVID-19  Had COVID ~1/2022, was hospitalized for ~6 days, was on BiPap for a little, was not intubated.  Currently no issues. Former smoker 2 ppd for 40 years quit on 09/09/09.   -On minimal oxygen requirements (1-3 L nasal cannula)     GI  # Liver injury, mixed hepatocellular & cholestatic, acute  #GERD  Noted 5/17. Likely in setting of hepatic congestion. Diuresis as above.   - Follow CMP q12h  - holding statin as above  - Obtaining US abdomen as part of transplant work up  - Continue pta famotidine 20mg bid     Renal  #BPH  - Continue pta tamsulosin 0.4mg daily     Rheum  # SLE  # APL  # Hx of DVT and PE  PTA on warfarin (he took 6.5mg daily). Transitioned to Lovenox at OSH in anticipation of surgery. He reports that his main symptom of lupus is diffuse joint pains that is tolerable with Tylenol.   - Rheumatology consulted, as above   - Lovenox 70mg q12h  - Continue pta hydroxychloroquine 200mg bid   - Holding pta mycophenolic acid 1500mg q12h per Rheumatology recs in the event he has surgery (In general for patients with compensated lupus undergoing elective surgery we recommend holding MMF one week prior to surgery and re-starting 3-5 days after surgery in the absence of surgical site infection, poor wound healing or infection elsewhere.)  - Tylenol prn for pain     Heme  #Anemia, chronic  - Continue pta ferrous sulfate 325mg daily      ID  No active signs of infection           Diet:   2g Na diet, 2L fluid restriction  DVT Prophylaxis: Lovenox  Fitzgerald Catheter: Not present  Code Status: Full          This patient was seen and discussed with Dr. Mendez who agrees with the above assessment and plan.     Chris Lawrence MD  Cardiology Fellow

## 2022-05-20 NOTE — PLAN OF CARE
Major Shift Events: discontinue Nitroprusside, increase Captopril, pull [place], Continue Workup for Txp/LVAD. Encourage therapies and Eating. Notified MD pt treats anxiety at home with sleeping meds.    Plan: 6C Transfer Orders.     For vital signs and complete assessments, please see documentation flowsheets.

## 2022-05-21 LAB
ALBUMIN SERPL-MCNC: 3.2 G/DL (ref 3.4–5)
ALBUMIN SERPL-MCNC: 3.4 G/DL (ref 3.4–5)
ALP SERPL-CCNC: 71 U/L (ref 40–150)
ALP SERPL-CCNC: 78 U/L (ref 40–150)
ALT SERPL W P-5'-P-CCNC: 110 U/L (ref 0–70)
ALT SERPL W P-5'-P-CCNC: 99 U/L (ref 0–70)
ANION GAP SERPL CALCULATED.3IONS-SCNC: 10 MMOL/L (ref 3–14)
ANION GAP SERPL CALCULATED.3IONS-SCNC: 8 MMOL/L (ref 3–14)
AST SERPL W P-5'-P-CCNC: 21 U/L (ref 0–45)
AST SERPL W P-5'-P-CCNC: 22 U/L (ref 0–45)
BACTERIA UR CULT: NO GROWTH
BILIRUB SERPL-MCNC: 0.5 MG/DL (ref 0.2–1.3)
BILIRUB SERPL-MCNC: 0.6 MG/DL (ref 0.2–1.3)
BUN SERPL-MCNC: 15 MG/DL (ref 7–30)
BUN SERPL-MCNC: 18 MG/DL (ref 7–30)
CALCIUM SERPL-MCNC: 8.7 MG/DL (ref 8.5–10.1)
CALCIUM SERPL-MCNC: 9.2 MG/DL (ref 8.5–10.1)
CHLORIDE BLD-SCNC: 105 MMOL/L (ref 94–109)
CHLORIDE BLD-SCNC: 108 MMOL/L (ref 94–109)
CO2 SERPL-SCNC: 20 MMOL/L (ref 20–32)
CO2 SERPL-SCNC: 23 MMOL/L (ref 20–32)
CREAT SERPL-MCNC: 0.72 MG/DL (ref 0.66–1.25)
CREAT SERPL-MCNC: 0.87 MG/DL (ref 0.66–1.25)
CYSTATIN C SERPL-MCNC: 1.4 MG/L
GAMMA INTERFERON BACKGROUND BLD IA-ACNC: 0.09 IU/ML
GFR SERPL CREATININE-BSD FRML MDRD: >90 ML/MIN/1.73M2
GFR SERPL CREATININE-BSD FRML MDRD: >90 ML/MIN/1.73M2
GLUCOSE BLD-MCNC: 82 MG/DL (ref 70–99)
GLUCOSE BLD-MCNC: 93 MG/DL (ref 70–99)
INR PPP: 1.17 (ref 0.85–1.15)
M TB IFN-G BLD-IMP: NEGATIVE
M TB IFN-G CD4+ BCKGRND COR BLD-ACNC: 9.91 IU/ML
MAGNESIUM SERPL-MCNC: 2.4 MG/DL (ref 1.6–2.3)
MITOGEN IGNF BCKGRD COR BLD-ACNC: -0.02 IU/ML
MITOGEN IGNF BCKGRD COR BLD-ACNC: 0 IU/ML
POTASSIUM BLD-SCNC: 3.8 MMOL/L (ref 3.4–5.3)
POTASSIUM BLD-SCNC: 4.6 MMOL/L (ref 3.4–5.3)
PROT SERPL-MCNC: 6.7 G/DL (ref 6.8–8.8)
PROT SERPL-MCNC: 7.4 G/DL (ref 6.8–8.8)
QUANTIFERON MITOGEN: 10 IU/ML
QUANTIFERON NIL TUBE: 0.09 IU/ML
QUANTIFERON TB1 TUBE: 0.07 IU/ML
QUANTIFERON TB2 TUBE: 0.09
SODIUM SERPL-SCNC: 136 MMOL/L (ref 133–144)
SODIUM SERPL-SCNC: 138 MMOL/L (ref 133–144)

## 2022-05-21 PROCEDURE — 214N000001 HC R&B CCU UMMC

## 2022-05-21 PROCEDURE — 36415 COLL VENOUS BLD VENIPUNCTURE: CPT

## 2022-05-21 PROCEDURE — 36415 COLL VENOUS BLD VENIPUNCTURE: CPT | Performed by: STUDENT IN AN ORGANIZED HEALTH CARE EDUCATION/TRAINING PROGRAM

## 2022-05-21 PROCEDURE — 83735 ASSAY OF MAGNESIUM: CPT | Performed by: INTERNAL MEDICINE

## 2022-05-21 PROCEDURE — 250N000013 HC RX MED GY IP 250 OP 250 PS 637

## 2022-05-21 PROCEDURE — 250N000013 HC RX MED GY IP 250 OP 250 PS 637: Performed by: INTERNAL MEDICINE

## 2022-05-21 PROCEDURE — 84155 ASSAY OF PROTEIN SERUM: CPT | Performed by: STUDENT IN AN ORGANIZED HEALTH CARE EDUCATION/TRAINING PROGRAM

## 2022-05-21 PROCEDURE — 99232 SBSQ HOSP IP/OBS MODERATE 35: CPT | Mod: GC | Performed by: INTERNAL MEDICINE

## 2022-05-21 PROCEDURE — 250N000013 HC RX MED GY IP 250 OP 250 PS 637: Performed by: STUDENT IN AN ORGANIZED HEALTH CARE EDUCATION/TRAINING PROGRAM

## 2022-05-21 PROCEDURE — 80053 COMPREHEN METABOLIC PANEL: CPT | Performed by: STUDENT IN AN ORGANIZED HEALTH CARE EDUCATION/TRAINING PROGRAM

## 2022-05-21 PROCEDURE — 85610 PROTHROMBIN TIME: CPT

## 2022-05-21 PROCEDURE — 250N000011 HC RX IP 250 OP 636

## 2022-05-21 RX ORDER — POTASSIUM CHLORIDE 750 MG/1
20 TABLET, EXTENDED RELEASE ORAL ONCE
Status: COMPLETED | OUTPATIENT
Start: 2022-05-21 | End: 2022-05-21

## 2022-05-21 RX ORDER — FUROSEMIDE 40 MG
40 TABLET ORAL
Status: DISCONTINUED | OUTPATIENT
Start: 2022-05-21 | End: 2022-05-22

## 2022-05-21 RX ADMIN — ZOLPIDEM TARTRATE 5 MG: 5 TABLET, FILM COATED ORAL at 22:23

## 2022-05-21 RX ADMIN — Medication 12.5 MG: at 16:56

## 2022-05-21 RX ADMIN — POTASSIUM CHLORIDE 20 MEQ: 750 TABLET, EXTENDED RELEASE ORAL at 07:27

## 2022-05-21 RX ADMIN — FAMOTIDINE 20 MG: 20 TABLET ORAL at 07:27

## 2022-05-21 RX ADMIN — ASPIRIN 81 MG: 81 TABLET, COATED ORAL at 07:27

## 2022-05-21 RX ADMIN — POTASSIUM CHLORIDE 20 MEQ: 750 TABLET, EXTENDED RELEASE ORAL at 20:12

## 2022-05-21 RX ADMIN — HYDROXYZINE HYDROCHLORIDE 50 MG: 50 TABLET ORAL at 20:21

## 2022-05-21 RX ADMIN — HYDROXYCHLOROQUINE SULFATE 200 MG: 200 TABLET, FILM COATED ORAL at 20:12

## 2022-05-21 RX ADMIN — ZOLPIDEM TARTRATE 5 MG: 5 TABLET, FILM COATED ORAL at 01:15

## 2022-05-21 RX ADMIN — TAMSULOSIN HYDROCHLORIDE 0.4 MG: 0.4 CAPSULE ORAL at 07:27

## 2022-05-21 RX ADMIN — FERROUS SULFATE TAB 325 MG (65 MG ELEMENTAL FE) 325 MG: 325 (65 FE) TAB at 18:32

## 2022-05-21 RX ADMIN — LORAZEPAM 1 MG: 0.5 TABLET ORAL at 22:23

## 2022-05-21 RX ADMIN — FUROSEMIDE 40 MG: 40 TABLET ORAL at 10:31

## 2022-05-21 RX ADMIN — FAMOTIDINE 20 MG: 20 TABLET ORAL at 20:12

## 2022-05-21 RX ADMIN — HYDROXYCHLOROQUINE SULFATE 200 MG: 200 TABLET, FILM COATED ORAL at 07:27

## 2022-05-21 RX ADMIN — ENOXAPARIN SODIUM 70 MG: 80 INJECTION SUBCUTANEOUS at 20:14

## 2022-05-21 RX ADMIN — Medication 1 TABLET: at 18:32

## 2022-05-21 RX ADMIN — Medication 5 MG: at 20:13

## 2022-05-21 RX ADMIN — THIAMINE HCL TAB 100 MG 100 MG: 100 TAB at 07:26

## 2022-05-21 RX ADMIN — ENOXAPARIN SODIUM 70 MG: 80 INJECTION SUBCUTANEOUS at 07:26

## 2022-05-21 RX ADMIN — FUROSEMIDE 40 MG: 40 TABLET ORAL at 16:54

## 2022-05-21 RX ADMIN — ATORVASTATIN CALCIUM 40 MG: 40 TABLET, FILM COATED ORAL at 07:28

## 2022-05-21 RX ADMIN — Medication 12.5 MG: at 07:27

## 2022-05-21 ASSESSMENT — ACTIVITIES OF DAILY LIVING (ADL)
ADLS_ACUITY_SCORE: 25
ADLS_ACUITY_SCORE: 25
ADLS_ACUITY_SCORE: 23
ADLS_ACUITY_SCORE: 25
ADLS_ACUITY_SCORE: 23
ADLS_ACUITY_SCORE: 25
ADLS_ACUITY_SCORE: 25
ADLS_ACUITY_SCORE: 23

## 2022-05-21 NOTE — PLAN OF CARE
See flowsheets for vital signs and detailed assessment.    Major Shift Updates/Changes: restless when awake, often times walking around the room. Anxious, PRN atarax given. Ambien given @ 0100 for sleep. Plan to transfer to floor when bed avaible    Verna Wiley  Shift cared for: 3340 - 0730

## 2022-05-21 NOTE — PROGRESS NOTES
[facility]    Cardiology Progress Note- Cards 2        Date of Admission:  5/16/2022     Assessment & Plan: NICOLASJUAN Dorman EDUARD Sands is a 61 year old male with history of CAD s/p remote PCI to LAD, ICM LVEF 30% s/p CRT-D, severe MR, APL with hx of DVT/PE on chronic anticoagulation with warfarin, hx of COVID-19 1/2022 (was hospitalized, not intubated), HTN, and HLD who was admitted to [facility] 5/16/2022 as a transfer from [facility] in [place] for evaluation of multivessel coronary artery disease and moderate-severe functional mitral regurgitation. Found to have dilated LV with LVEF 10-20% making him high-risk surgical candidate. Currently undergoing LVAD/Transplant work up.    Changes Today:   - Transfer to floor  - Consider RCA/LAD PCI, will discuss with interventionalists  - Resume PTA lasix 40mg BID, PTA potassium supplementation  - Discuss colonoscopy with GI: plan for Monday  - Discuss with VAD coordinators for pre-VAD/txpt edu     # Longstanding HFrEF Secondary to ICM (LVEF 10-20%) NYHA III; ACC/AHA Stage D; SCAI Stage C  # CAD s/p PCI to LAD (2005)  # Hx of MI (2007)  # Severe ostial LAD disease with in-stent restenosis of mid LAD at diag bifurcation, severe proximal RCA disease, mild circ disease  # S/p CRT-D (Medtronic 2006, gen change 2012)  # Severe Functional MR  # Dilated LV (LVIDd 6.1 cm)  Transferred from [facility] in [place] 5/16/2022 for evaluation for CABG + MVR at a center with ECMO/VAD as a back up. However at the OSH, his EF was reported 30-35%, and EF here is 10-20% (on TTE and CMRI) with severely dilated LV. CMRI showing infarcted myocardium in LAD territory. Given the degree of LV dysfunction/dilation, moderate to severe functional MR and lack of viable myocardium, cardiac surgery would be high risk. Will proceed with LVAD/Transplant work up. Hopefully will have a decision on treatment plan by sometime next week  -  ACEI/ARB/ARNI- Captopril 12.5mg Q8H for afterload reduction  - BB- Hold pta metoprolol succinate 12.5mg at bedtime in setting of cardiogenic shock  - Aldosterone receptor antagonist- Hold off  - SGLT2i- Hold off   - Afterload reduction- ACEi as above  - Antiplatelet- continue pta aspirin 81mg daily, holding pta Plavix 75 mg daily  - Statin- continue pta atorvastatin 40mg daily   - Volume status: Seems to be euvolemic. Plan to keep I=O   - Diuretic- PTA lasix 40mg BID  - SCD- S/p CRT-D (Medtronic 2006, gen change 2012)  - OT consult  - Nutrition consult  - Strict I&Os  - Daily weights  - Telemetry  - Discuss possibility of RCA/LAD PCI with interventional cardiology      LVAD/Transplant Evaluation Checklist  [x] labs (CBC, CMP, PT/INR, cystatin C, prealbumin, UA + micro)  [x] Infectious (Hep A/B/C, HIV, treponema, HSV 1/2 IgG, CMV IgG, Toxo IgG, EBV IgG, varicella IgG, Quant gold, COVID vaccine/PCR)  [] Utox/nicotine and cotinine/PeTH   - utox ordered   [] Immunocompatibility (last transfusion, ABO, HLA tissue typing, PRA)  [x] ECG, Echo  [] CPX   [] 6MWT   - Ordered, to be completed Monday 5/23  [x] RHC  [x] CVTS consult  [x] Social work consult  []  Palliative care consult: order placed, consult pending  [] Neuropsych consult: order placed, consult pending  [x] Nutrition consult  [x] CT Dental   [x] Dental consult  [x] Abd US + doppler  - Abd US complete done, not with doppler; OK given unremarkable CT CAP  [x] Extremity US and ABIs  [x] Carotid US (if DM or ICM or >51yo)  [] PFTs:   [] Dexa: outpatient  [x] CT head non-contrast  [x] CT CAP non-contrast  [] Colonoscopy (>51 yo): planned for Monday  -- Low residue/full liquid diet today (5/21/22)  -- Clear liquid diet to start tomorrow (5/22/22) (no reds, blues, purples)  -- Start 4 L of GoLytely Prep at 14:00 tomorrow, 8-12 oz every 15 minutes until gone  -- GI will contact team regarding final lovenox recommendations tomorrow   -- Plan for colonoscopy with  anesthesia on Monday, 5/23/22  [x] PSA/HCG     # Anxiety  # Insomnia  - PTA lorazepam 0.5-1 mg q6h PRN (pta q4h prn)  - PTA zolpidem 5mg at bedtime prn  - Hydroxyzine prn for anxiety  - Melatonin scheduled   - Psych consult in the AM    # Mild-Moderate Emphysema  # Hx of COVID-19  Had COVID ~1/2022, was hospitalized for ~6 days, was on BiPap for a little, was not intubated. Currently no issues. Former smoker 2 ppd for 40 years quit on 09/09/09.      # Liver injury, mixed hepatocellular & cholestatic, acute  Noted 5/17. Likely in setting of hepatic congestion. Diuresis as above.   - Follow CMP q12h     # GERD  - Continue pta famotidine 20mg bid      # BPH  - Continue pta tamsulosin 0.4mg daily     # SLE  # APL  # Hx of DVT and PE  PTA on warfarin (he took 6.5mg daily). Transitioned to Lovenox at OSH in anticipation of surgery. He reports that his main symptom of lupus is diffuse joint pains that is tolerable with Tylenol.   - Rheumatology consulted, as above   - Lovenox 70mg q12h  - Continue pta hydroxychloroquine 200mg bid   - Holding pta mycophenolic acid 1500mg q12h per Rheumatology recs in the event he has surgery (In general for patients with compensated lupus undergoing elective surgery we recommend holding MMF one week prior to surgery and re-starting 3-5 days after surgery in the absence of surgical site infection, poor wound healing or infection elsewhere.)   - Discuss MMF with rheum Monday given no acute surgical plan  - Tylenol prn for pain     # Anemia, chronic  - Continue pta ferrous sulfate 325mg daily     # Severe malnutrition in the context of chronic illness  Supplements to help maintain nutrition.      Diet:  2g Na diet, 2L fluid restriction  DVT Prophylaxis: Enoxaparin (Lovenox) SQ  Fitzgerald Catheter: Not present  Code Status:   FULL      Disposition Plan   Expected discharge: 4 - 7 days, recommended to prior living arrangement once advanced heart failure work-up completed.    Entered: Cindy Rea  MD Rafael 05/21/2022, 7:17 AM       The patient's care was discussed with the Attending Physician, Dr. Mendez. .    Cindy Hall MD  [facility]    ______________________________________________________________________    Interval History   NAEO. Patient slept very well. Wondering why he 'just feels the same'. Still has fatigue, shortness of breath with exertion. Denies light-headedness, chest pain, headache, edema.     Today he was asking whether we could do surgery 'just to fix things 50% at least'. Discussed that surgery would not be an option for him based on his myocardial ischemia in the LAD territory. RCA stenting, however, may be something we could consider. He was surprised, thinking his surgical risk was 4%, but we relayed the thought that this was an underestimate. Additionally, outlined that if his heart function does not change, potential mortality is very high in the coming months (the patient was thinking he would 'probably be fine for the next 5 years or so').     Data reviewed today: I reviewed all medications, new labs and imaging results over the last 24 hours. I personally reviewed no imaging or EKG's to review for today.     Physical Exam   Vital Signs: Temp: 97.8  F (36.6  C) Temp src: Oral BP: (!) 86/58 Pulse: 83   Resp: 18 SpO2: 96 % O2 Device: None (Room air)    Weight: 157 lbs 13.59 oz  General Appearance: Sitting up in chair, NAD  Respiratory: Normal work of breathing, no wheezes/rales/rhonchi appreciated  Cardiovascular: RRR, s3 appreciated; JVP est 10cm; no BLE edema  GI: Abdomen soft, nontender; bowel sounds present  Skin: No rashes on visible skin  Other: Pleasant mood, full affect     Data   Recent Labs   Lab 05/21/22  0433 05/20/22  1715 05/20/22  0516 05/19/22  1248 05/19/22  0342 05/18/22  1626 05/18/22  1526 05/18/22  1502 05/18/22  0804   WBC  --   --  5.5  --  5.7  --   --   --  9.7   HGB  --   --  8.6*  --  8.5*  --  8.4*   <  > 8.7*   MCV  --   --  92  --  92  --   --   --  92   PLT  --   --  268  --  274  --   --   --  358   INR 1.17*  --  1.24*  --  1.29*  --   --   --   --     136 138   < > 139   < >  --   --   --    POTASSIUM 4.6 4.3 4.2   < > 3.6   < >  --   --   --    CHLORIDE 108 105 105   < > 106   < >  --   --   --    CO2 20 22 24   < > 23   < >  --   --   --    BUN 15 13 16   < > 19   < >  --   --   --    CR 0.72 0.87 0.92   < > 0.96   < >  --   --   --    ANIONGAP 8 9 9   < > 10   < >  --   --   --    ELDA 9.2 8.9 8.4*   < > 8.7   < >  --   --   --    GLC 82 96 87   < > 81   < >  --   --   --    ALBUMIN 3.2* 3.2* 3.1*   < > 3.2*   < >  --   --   --    PROTTOTAL 6.7* 6.6* 6.0*   < > 6.6*   < >  --   --   --    BILITOTAL 0.6 0.5 0.5   < > 0.7   < >  --   --   --    ALKPHOS 71 71 71   < > 74   < >  --   --   --    * 130* 151*   < > 213*   < >  --   --   --    AST 21 26 40   < > 94*   < >  --   --   --     < > = values in this interval not displayed.     Recent Results (from the past 24 hour(s))   XR Chest Port 1 View    Narrative    EXAMINATION: XR CHEST PORT 1 VIEW, 5/20/2022 5:20 PM    INDICATION: [place] placement due to repositioning after abnormal  waveform    COMPARISON: Chest x-ray 5/20/2022    FINDINGS: 2 AP radiographs of the chest. Interval signs of right IJ  [place]-Janae catheter with tip terminating over the distal left pulmonary  artery versus central left lower lobe are pulmonary artery. Left chest  generator with ICD and pacer wires projecting over the right atrium,  right ventricle, and coronary sinus.    Trachea is midline. The cardiac mediastinal silhouette is stable. No  pleural effusion. No pneumothorax. Unchanged mixed interstitial and  airspace opacities.      Impression    IMPRESSION:  Interval placement of [place]-Janae catheter with tip terminating of the  distal left pulmonary artery versus central left lower lobar pulmonary  artery. Remainder of the exam is stable.    I have personally reviewed the  examination and initial interpretation  and I agree with the findings.    YESENIA KEITH,          SYSTEM ID:  [id number]

## 2022-05-21 NOTE — PLAN OF CARE
Transferred to: Unit 6C at 10:40  Belongings:   Fitzgerald removed? NA  Central line removed? NA  Chart and medications sent with patient Yes  Family notified: No, Pt will notify.    Pt sent in wheelchair with RN and NST. Belongings sent with patient.

## 2022-05-21 NOTE — PLAN OF CARE
Patient here for advanced heart failure workup. Diuresing - PO lasix.   Hx of CAD, STEMI, ICD/PPM, DVT/PE on warfarin, Lupus.    Vitals: Remain stable on RA - runs of paroxysmal atrial tachycardia 130's - longers run 46 beats, cards cross cover notified and they will review tele hx.   Neuro: A/Ox4, severe anxiety noted in chart, patient pleasant and cooperative throughout this shift, PRN anxiety meds available.   Activity: Up independently on unit. Calls appropriately.  I/O: Voids in urinal, no BM this shift, tolerates PO intake and pills.   Skin: 2 person skin assessment completed on transfer to  from  with this writer and RN circulator KL. Minor erythema on chest from tele patches, ecchymotic area on left forearm from prior IV attempt (per pt). PIV x 2 with no drips running.    Plan: Continuing advanced therapies workup, possible PCI/stent TBD by interventional team prior to discharge. Full liquid diet tonight, clear liquid diet starting 5/22 for colonoscopy prep to start 1400 on 5/22. No red/purple foods.

## 2022-05-21 NOTE — PROGRESS NOTES
Brief GI Procedural Consult Note:    05/21/22    Our service was contacted regarding Mr. Sands and his need for a pre-cardiac transplant evaluation lower endoscopy given his age (>50) and lack of previous exam.     Recommendations:  -- Low residue/full liquid diet today (5/21/22)  -- Clear liquid diet to start tomorrow (5/22/22) (no reds, blues, purples)  -- start 4 L of GoLytely Prep at 14:00 tomorrow, 8-12 oz every 15 minutes until gone  -- we will contact to your team regarding final lovenox recommendations tomorrow  -- plan for colonoscopy with anesthesia on Monday, 5/23/22  -- please do not hesitate to reach out with any questions or concerns in the interim    Terrence Sharif MD, PGY4  Gastroenterology Fellow  [facility]  See [facility]/Anastacia for GI on-call information    Case discussed with GI staff Dr. Meneses.

## 2022-05-21 NOTE — PLAN OF CARE
Transferred from: 4E, report received from Shaun LONDON  Via: wheelchair with 4E RN  Belongings: accounted for and with patient (, iphone, ipad, glasses x2, and 2 green bags of clothing from home - put in closet).   Tele box placed on patient.  Rhythm: SR/ST  VS:Stable on RA.  Oriented to unit.  Teaching provided on call don't fall, call light system, meal times, visiting hours, unit orientation. I/O monitoring.  Importance of safety stressed.  Patient verbalized understanding of expectations.

## 2022-05-21 NOTE — PLAN OF CARE
Physical Therapy: Orders received. Chart reviewed and discussed with care team.? Physical Therapy not indicated due to PT order received for 6MWT, OT following pt for CR, pt does nto have needs for 2 therapy disciplines. PT will defer, OT will complete 6MWT as needed..? Defer discharge recommendations to OT.? Will complete orders.

## 2022-05-22 ENCOUNTER — ANESTHESIA EVENT (OUTPATIENT)
Dept: SURGERY | Facility: CLINIC | Age: 62
DRG: 246 | End: 2022-05-22
Payer: COMMERCIAL

## 2022-05-22 LAB
ALBUMIN SERPL-MCNC: 3.4 G/DL (ref 3.4–5)
ALBUMIN SERPL-MCNC: 3.8 G/DL (ref 3.4–5)
ALP SERPL-CCNC: 72 U/L (ref 40–150)
ALP SERPL-CCNC: 82 U/L (ref 40–150)
ALT SERPL W P-5'-P-CCNC: 86 U/L (ref 0–70)
ALT SERPL W P-5'-P-CCNC: 88 U/L (ref 0–70)
AMPHETAMINES SERPL QL SCN: NEGATIVE NG/ML
AMPHETAMINES UR QL SCN: NORMAL
ANION GAP SERPL CALCULATED.3IONS-SCNC: 11 MMOL/L (ref 3–14)
ANION GAP SERPL CALCULATED.3IONS-SCNC: 8 MMOL/L (ref 3–14)
ANNOTATION COMMENT IMP: NORMAL
AST SERPL W P-5'-P-CCNC: 21 U/L (ref 0–45)
AST SERPL W P-5'-P-CCNC: 26 U/L (ref 0–45)
BARBITURATES SERPL QL SCN: NEGATIVE NG/ML
BARBITURATES UR QL: NORMAL
BASOPHILS # BLD AUTO: 0 10E3/UL (ref 0–0.2)
BASOPHILS NFR BLD AUTO: 1 %
BENZODIAZ SERPL QL SCN: NEGATIVE NG/ML
BENZODIAZ UR QL: NORMAL
BILIRUB SERPL-MCNC: 0.5 MG/DL (ref 0.2–1.3)
BILIRUB SERPL-MCNC: 0.6 MG/DL (ref 0.2–1.3)
BUN SERPL-MCNC: 15 MG/DL (ref 7–30)
BUN SERPL-MCNC: 16 MG/DL (ref 7–30)
BUPRENORPHINE SERPL-MCNC: NEGATIVE NG/ML
CALCIUM SERPL-MCNC: 9.2 MG/DL (ref 8.5–10.1)
CALCIUM SERPL-MCNC: 9.5 MG/DL (ref 8.5–10.1)
CANNABINOIDS SERPL QL SCN: NEGATIVE NG/ML
CANNABINOIDS UR QL SCN: NORMAL
CHLORIDE BLD-SCNC: 101 MMOL/L (ref 94–109)
CHLORIDE BLD-SCNC: 105 MMOL/L (ref 94–109)
CO2 SERPL-SCNC: 19 MMOL/L (ref 20–32)
CO2 SERPL-SCNC: 25 MMOL/L (ref 20–32)
COCAINE SERPL QL SCN: NEGATIVE NG/ML
COCAINE UR QL: NORMAL
CREAT SERPL-MCNC: 0.94 MG/DL (ref 0.66–1.25)
CREAT SERPL-MCNC: 1.01 MG/DL (ref 0.66–1.25)
EOSINOPHIL # BLD AUTO: 0.1 10E3/UL (ref 0–0.7)
EOSINOPHIL NFR BLD AUTO: 2 %
ERYTHROCYTE [DISTWIDTH] IN BLOOD BY AUTOMATED COUNT: 18.7 % (ref 10–15)
GFR SERPL CREATININE-BSD FRML MDRD: 85 ML/MIN/1.73M2
GFR SERPL CREATININE-BSD FRML MDRD: >90 ML/MIN/1.73M2
GFR/BSA.PRED SERPLBLD CYS-BASED-ARV: 50 ML/MIN/BSA
GLUCOSE BLD-MCNC: 147 MG/DL (ref 70–99)
GLUCOSE BLD-MCNC: 82 MG/DL (ref 70–99)
HCT VFR BLD AUTO: 32.7 % (ref 40–53)
HGB BLD-MCNC: 9.8 G/DL (ref 13.3–17.7)
IMM GRANULOCYTES # BLD: 0.1 10E3/UL
IMM GRANULOCYTES NFR BLD: 1 %
INR PPP: 1.13 (ref 0.85–1.15)
LYMPHOCYTES # BLD AUTO: 0.7 10E3/UL (ref 0.8–5.3)
LYMPHOCYTES NFR BLD AUTO: 13 %
MAGNESIUM SERPL-MCNC: 2.3 MG/DL (ref 1.6–2.3)
MCH RBC QN AUTO: 28.2 PG (ref 26.5–33)
MCHC RBC AUTO-ENTMCNC: 30 G/DL (ref 31.5–36.5)
MCV RBC AUTO: 94 FL (ref 78–100)
METHADONE SERPL QL SCN: NEGATIVE NG/ML
METHAMPHET SERPL QL: NEGATIVE NG/ML
MONOCYTES # BLD AUTO: 0.8 10E3/UL (ref 0–1.3)
MONOCYTES NFR BLD AUTO: 15 %
NEUTROPHILS # BLD AUTO: 3.9 10E3/UL (ref 1.6–8.3)
NEUTROPHILS NFR BLD AUTO: 68 %
NRBC # BLD AUTO: 0 10E3/UL
NRBC BLD AUTO-RTO: 0 /100
OPIATES SERPL QL SCN: NEGATIVE NG/ML
OPIATES UR QL SCN: NORMAL
OXYCODONE SERPL QL: NEGATIVE NG/ML
PCP SERPL QL SCN: NEGATIVE NG/ML
PCP UR QL SCN: NORMAL
PLATELET # BLD AUTO: 274 10E3/UL (ref 150–450)
PLPETH BLD-MCNC: <10 NG/ML
POPETH BLD-MCNC: <10 NG/ML
POTASSIUM BLD-SCNC: 4 MMOL/L (ref 3.4–5.3)
POTASSIUM BLD-SCNC: 4.4 MMOL/L (ref 3.4–5.3)
PROT SERPL-MCNC: 7.3 G/DL (ref 6.8–8.8)
PROT SERPL-MCNC: 8 G/DL (ref 6.8–8.8)
RBC # BLD AUTO: 3.47 10E6/UL (ref 4.4–5.9)
SODIUM SERPL-SCNC: 134 MMOL/L (ref 133–144)
SODIUM SERPL-SCNC: 135 MMOL/L (ref 133–144)
WBC # BLD AUTO: 5.7 10E3/UL (ref 4–11)

## 2022-05-22 PROCEDURE — 250N000011 HC RX IP 250 OP 636

## 2022-05-22 PROCEDURE — 250N000013 HC RX MED GY IP 250 OP 250 PS 637

## 2022-05-22 PROCEDURE — 85025 COMPLETE CBC W/AUTO DIFF WBC: CPT

## 2022-05-22 PROCEDURE — 36415 COLL VENOUS BLD VENIPUNCTURE: CPT

## 2022-05-22 PROCEDURE — 83735 ASSAY OF MAGNESIUM: CPT

## 2022-05-22 PROCEDURE — 85610 PROTHROMBIN TIME: CPT

## 2022-05-22 PROCEDURE — 214N000001 HC R&B CCU UMMC

## 2022-05-22 PROCEDURE — 250N000013 HC RX MED GY IP 250 OP 250 PS 637: Performed by: INTERNAL MEDICINE

## 2022-05-22 PROCEDURE — 84155 ASSAY OF PROTEIN SERUM: CPT | Performed by: STUDENT IN AN ORGANIZED HEALTH CARE EDUCATION/TRAINING PROGRAM

## 2022-05-22 PROCEDURE — 250N000013 HC RX MED GY IP 250 OP 250 PS 637: Performed by: STUDENT IN AN ORGANIZED HEALTH CARE EDUCATION/TRAINING PROGRAM

## 2022-05-22 PROCEDURE — 99233 SBSQ HOSP IP/OBS HIGH 50: CPT

## 2022-05-22 PROCEDURE — 99232 SBSQ HOSP IP/OBS MODERATE 35: CPT | Mod: GC | Performed by: INTERNAL MEDICINE

## 2022-05-22 PROCEDURE — 80053 COMPREHEN METABOLIC PANEL: CPT | Performed by: STUDENT IN AN ORGANIZED HEALTH CARE EDUCATION/TRAINING PROGRAM

## 2022-05-22 PROCEDURE — 36415 COLL VENOUS BLD VENIPUNCTURE: CPT | Performed by: STUDENT IN AN ORGANIZED HEALTH CARE EDUCATION/TRAINING PROGRAM

## 2022-05-22 RX ORDER — DULOXETIN HYDROCHLORIDE 30 MG/1
30 CAPSULE, DELAYED RELEASE ORAL DAILY
Status: DISCONTINUED | OUTPATIENT
Start: 2022-05-23 | End: 2022-05-26 | Stop reason: HOSPADM

## 2022-05-22 RX ORDER — CAPTOPRIL 12.5 MG/1
12.5 TABLET ORAL 3 TIMES DAILY
Status: DISCONTINUED | OUTPATIENT
Start: 2022-05-22 | End: 2022-05-25

## 2022-05-22 RX ADMIN — FERROUS SULFATE TAB 325 MG (65 MG ELEMENTAL FE) 325 MG: 325 (65 FE) TAB at 14:31

## 2022-05-22 RX ADMIN — FAMOTIDINE 20 MG: 20 TABLET ORAL at 19:44

## 2022-05-22 RX ADMIN — CAPTOPRIL 12.5 MG: 12.5 TABLET ORAL at 14:31

## 2022-05-22 RX ADMIN — FAMOTIDINE 20 MG: 20 TABLET ORAL at 08:54

## 2022-05-22 RX ADMIN — POLYETHYLENE GLYCOL 3350, SODIUM SULFATE ANHYDROUS, SODIUM BICARBONATE, SODIUM CHLORIDE, POTASSIUM CHLORIDE 4000 ML: 236; 22.74; 6.74; 5.86; 2.97 POWDER, FOR SOLUTION ORAL at 14:26

## 2022-05-22 RX ADMIN — ASPIRIN 81 MG: 81 TABLET, COATED ORAL at 08:55

## 2022-05-22 RX ADMIN — ENOXAPARIN SODIUM 70 MG: 80 INJECTION SUBCUTANEOUS at 08:58

## 2022-05-22 RX ADMIN — FUROSEMIDE 40 MG: 40 TABLET ORAL at 10:14

## 2022-05-22 RX ADMIN — POTASSIUM CHLORIDE 20 MEQ: 750 TABLET, EXTENDED RELEASE ORAL at 10:13

## 2022-05-22 RX ADMIN — Medication 1 TABLET: at 14:32

## 2022-05-22 RX ADMIN — HYDROXYCHLOROQUINE SULFATE 200 MG: 200 TABLET, FILM COATED ORAL at 08:53

## 2022-05-22 RX ADMIN — CAPTOPRIL 12.5 MG: 12.5 TABLET ORAL at 19:44

## 2022-05-22 RX ADMIN — TAMSULOSIN HYDROCHLORIDE 0.4 MG: 0.4 CAPSULE ORAL at 08:54

## 2022-05-22 RX ADMIN — Medication 5 MG: at 21:59

## 2022-05-22 RX ADMIN — THIAMINE HCL TAB 100 MG 100 MG: 100 TAB at 08:54

## 2022-05-22 RX ADMIN — ATORVASTATIN CALCIUM 40 MG: 40 TABLET, FILM COATED ORAL at 08:55

## 2022-05-22 RX ADMIN — ZOLPIDEM TARTRATE 5 MG: 5 TABLET, FILM COATED ORAL at 21:59

## 2022-05-22 RX ADMIN — HYDROXYZINE HYDROCHLORIDE 50 MG: 50 TABLET ORAL at 21:59

## 2022-05-22 RX ADMIN — HYDROXYCHLOROQUINE SULFATE 200 MG: 200 TABLET, FILM COATED ORAL at 19:44

## 2022-05-22 ASSESSMENT — ACTIVITIES OF DAILY LIVING (ADL)
ADLS_ACUITY_SCORE: 23

## 2022-05-22 ASSESSMENT — NEW YORK HEART ASSOCIATION (NYHA) CLASSIFICATION: NYHA FUNCTIONAL CLASS: III

## 2022-05-22 ASSESSMENT — COPD QUESTIONNAIRES: COPD: 1

## 2022-05-22 ASSESSMENT — ENCOUNTER SYMPTOMS: DYSRHYTHMIAS: 1

## 2022-05-22 NOTE — PLAN OF CARE
Patient here for advanced heart failure workup. Diuresing - PO lasix.   Hx of CAD, STEMI, ICD/PPM, DVT/PE on warfarin, Lupus.    Updates: Patient has completed 75% of Golytely prep by end of this shift. Reports nearly clear/yellow liquid stools.    Vitals: Remain stable on RA - r18 seconds on A-Flutter in 150's reported by monitor tech, cards 2 notified and no new orders, will continue to monitor and report any additional occurences/changes.   Neuro: A/Ox4, severe anxiety noted in chart, patient pleasant and cooperative throughout this shift, PRN anxiety meds available.   Activity: Up independently on unit. Calls appropriately.  I/O: Voids in urinal, tolerates PO intake and pills.      Plan: Colonoscopy tomorrow 5/23 in OR. Continuing advanced therapies workup.

## 2022-05-22 NOTE — PROGRESS NOTES
[facility]    Cardiology Progress Note- Cards 2        Date of Admission:  5/16/2022     Assessment & Plan: NICOLASJUAN Dorman EDUARD Sands is a 61 year old male with history of CAD s/p remote PCI to LAD, ICM LVEF 30% s/p CRT-D, severe MR, APL with hx of DVT/PE on chronic anticoagulation with warfarin, hx of COVID-19 1/2022 (was hospitalized, not intubated), HTN, and HLD who was admitted to [facility] 5/16/2022 as a transfer from [facility] in [place] for evaluation of multivessel coronary artery disease and moderate-severe functional mitral regurgitation. Found to have dilated LV with LVEF 10-20% making him high-risk surgical candidate. Currently undergoing LVAD/Transplant work up.    Changes Today:   - Discuss RCA/LAD PCI with interventionalists tomorrow  - Hold lasic/KCl  - GoLytely prep today  - Discuss with VAD coordinators for pre-VAD/txpt edu tomorrow  - Psych consult    # Longstanding HFrEF Secondary to ICM (LVEF 10-20%) NYHA III; ACC/AHA Stage D; SCAI Stage C  # CAD s/p PCI to LAD (2005)  # Hx of MI (2007)  # Severe ostial LAD disease with in-stent restenosis of mid LAD at diag bifurcation, severe proximal RCA disease, mild circ disease  # S/p CRT-D (Medtronic 2006, gen change 2012)  # Severe Functional MR  # Dilated LV (LVIDd 6.1 cm)  Transferred from [facility] in [place] 5/16/2022 for evaluation for CABG + MVR at a center with ECMO/VAD as a back up. However at the OSH, his EF was reported 30-35%, and EF here is 10-20% (on TTE and CMRI) with severely dilated LV. CMRI showing infarcted myocardium in LAD territory. Given the degree of LV dysfunction/dilation, moderate to severe functional MR and lack of viable myocardium, cardiac surgery would be high risk. Will proceed with LVAD/Transplant work up. Hopefully will have a decision on treatment plan by sometime next week  - ACEI/ARB/ARNI- Captopril 12.5mg Q8H for afterload reduction  - BB- Hold pta  metoprolol succinate 12.5mg at bedtime in setting of cardiogenic shock  - Aldosterone receptor antagonist- Hold off  - SGLT2i- Hold off   - Afterload reduction- ACEi as above  - Antiplatelet- continue pta aspirin 81mg daily, holding pta Plavix 75 mg daily  - Statin- continue pta atorvastatin 40mg daily   - Volume status: Seems to be euvolemic. Plan to keep I=O   - Diuretic- PTA lasix 40mg today; holding PM dose for c-scope in AM  - SCD- S/p CRT-D (Medtronic 2006, gen change 2012)  - OT consult  - Nutrition consult  - Strict I&Os  - Daily weights  - Telemetry  - Discuss possibility of RCA/LAD PCI with interventional cardiology      LVAD/Transplant Evaluation Checklist  [x] labs (CBC, CMP, PT/INR, cystatin C, prealbumin, UA + micro)  [x] Infectious (Hep A/B/C, HIV, treponema, HSV 1/2 IgG, CMV IgG, Toxo IgG, EBV IgG, varicella IgG, Quant gold, COVID vaccine/PCR)  [] Utox/nicotine and cotinine/PeTH   - utox ordered   [] Immunocompatibility (last transfusion, ABO, HLA tissue typing, PRA)  [x] ECG, Echo  [] CPX   [] 6MWT   - Ordered, to be completed Monday 5/23  [x] RHC  [x] CVTS consult  [x] Social work consult  []  Palliative care consult: order placed, consult pending  [] Neuropsych consult: order placed, consult pending  [x] Nutrition consult  [x] CT Dental   [x] Dental consult  [x] Abd US + doppler  - Abd US complete done, not with doppler; OK given unremarkable CT CAP  [x] Extremity US and ABIs  [x] Carotid US (if DM or ICM or >51yo)  [] PFTs:   [] Dexa: outpatient  [x] CT head non-contrast  [x] CT CAP non-contrast  [] Colonoscopy (>51 yo): planned for Monday  -- Low residue/full liquid diet today (5/21/22)  -- Clear liquid diet to start tomorrow (5/22/22) (no reds, blues, purples)  -- Start 4 L of GoLytely Prep at 14:00, 8-12 oz every 15 minutes until gone   -- Plan for colonoscopy with anesthesia on Monday, 5/23/22  [x] PSA/HCG     # Anxiety  # Insomnia  - PTA lorazepam 0.5-1 mg q6h PRN (pta q4h prn)  - PTA  zolpidem 5mg at bedtime prn  - Hydroxyzine prn for anxiety  - Melatonin scheduled   - Psych consult    # Mild-Moderate Emphysema  # Hx of COVID-19  Had COVID ~1/2022, was hospitalized for ~6 days, was on BiPap for a little, was not intubated. Currently no issues. Former smoker 2 ppd for 40 years quit on 09/09/09.      # Liver injury, mixed hepatocellular & cholestatic, acute  Noted 5/17. Likely in setting of hepatic congestion. Diuresis as above.   - Follow CMP q12h     # GERD  - Continue pta famotidine 20mg bid      # BPH  - Continue pta tamsulosin 0.4mg daily     # SLE  # APL  # Hx of DVT and PE  PTA on warfarin (he took 6.5mg daily). Transitioned to Lovenox at OSH in anticipation of surgery. He reports that his main symptom of lupus is diffuse joint pains that is tolerable with Tylenol.   - Rheumatology consulted, as above   - Lovenox 70mg q12h (hold for c-scope)  - Continue pta hydroxychloroquine 200mg bid   - Holding pta mycophenolic acid 1500mg q12h per Rheumatology recs in the event he has surgery (In general for patients with compensated lupus undergoing elective surgery we recommend holding MMF one week prior to surgery and re-starting 3-5 days after surgery in the absence of surgical site infection, poor wound healing or infection elsewhere.)   - Discuss MMF with rheum Monday given no acute surgical plan  - Tylenol prn for pain     # SVT  # Nonsustained VT  SVT noted 5/21 with rates up to 165bpm self terminating after up to 40 secs, 3 episodes at 4pm, hemodynamically stable. ~8AM 5/22 noted to have 22bts nonsustained VT.  Asymptomatic.  - Telemetry  - Monitor lytes    # Anemia, chronic  - Continue pta ferrous sulfate 325mg daily     # Severe malnutrition in the context of chronic illness  Supplements to help maintain nutrition.      Diet:  2g Na diet, 2L fluid restriction  DVT Prophylaxis: Enoxaparin (Lovenox) subcutaneous   Fitzgerald Catheter: Not present  Code Status:   FULL      Disposition Plan   Expected  discharge: 4 - 7 days, recommended to prior living arrangement once advanced heart failure work-up completed.    Entered: Cindy Hall MD 05/22/2022, 6:58 AM       The patient's care was discussed with the Attending Physician, Dr. Mendez. .    Cindy Hall MD  [facility]    ______________________________________________________________________    Interval History   NAEO. Denies shortness of breath, light-headedness, chest pain, fevers, chills, abdominal pain. Quietly resigned to the 'torture' that is the colonoscopy prep.     Data reviewed today: I reviewed all medications, new labs and imaging results over the last 24 hours. I personally reviewed no imaging or EKG's to review for today.     Physical Exam   Vital Signs: Temp: 97.6  F (36.4  C) Temp src: Oral BP: 95/66 Pulse: 88   Resp: 16 SpO2: 98 % O2 Device: None (Room air)    Weight: 146 lbs 0 oz  General Appearance: Sitting up in chair, NAD  Respiratory: Normal work of breathing, no wheezes/rales/rhonchi appreciated  Cardiovascular: RRR, s3 appreciated; JVP est 8cm; no BLE edema  GI: Abdomen soft, nontender; bowel sounds present  Skin: No rashes on visible skin  Other: Pleasant mood, full affect     Data   Recent Labs   Lab 05/21/22  1833 05/21/22  0433 05/20/22  1715 05/20/22  0516 05/19/22  1248 05/19/22  0342 05/18/22  1626 05/18/22  1526 05/18/22  1502 05/18/22  0804   WBC  --   --   --  5.5  --  5.7  --   --   --  9.7   HGB  --   --   --  8.6*  --  8.5*  --  8.4*   < > 8.7*   MCV  --   --   --  92  --  92  --   --   --  92   PLT  --   --   --  268  --  274  --   --   --  358   INR  --  1.17*  --  1.24*  --  1.29*  --   --   --   --     136 136 138   < > 139   < >  --   --   --    POTASSIUM 3.8 4.6 4.3 4.2   < > 3.6   < >  --   --   --    CHLORIDE 105 108 105 105   < > 106   < >  --   --   --    CO2 23 20 22 24   < > 23   < >  --   --   --    BUN 18 15 13 16   < > 19   < >  --   --   --     CR 0.87 0.72 0.87 0.92   < > 0.96   < >  --   --   --    ANIONGAP 10 8 9 9   < > 10   < >  --   --   --    ELDA 8.7 9.2 8.9 8.4*   < > 8.7   < >  --   --   --    GLC 93 82 96 87   < > 81   < >  --   --   --    ALBUMIN 3.4 3.2* 3.2* 3.1*   < > 3.2*   < >  --   --   --    PROTTOTAL 7.4 6.7* 6.6* 6.0*   < > 6.6*   < >  --   --   --    BILITOTAL 0.5 0.6 0.5 0.5   < > 0.7   < >  --   --   --    ALKPHOS 78 71 71 71   < > 74   < >  --   --   --    ALT 99* 110* 130* 151*   < > 213*   < >  --   --   --    AST 22 21 26 40   < > 94*   < >  --   --   --     < > = values in this interval not displayed.     No results found for this or any previous visit (from the past 24 hour(s)).

## 2022-05-22 NOTE — PLAN OF CARE
D:  admitted to [facility] 5/16/2022 as a transfer from OSH for evaluation of multivessel CAD and moderate-severe functional mitral regurgitation. Found to have dilated LV with LVEF 10-20% making him high-risk surgical candidate. Currently undergoing LVAD/Transplant work up.  PMH of CAD s/p remote PCI to LAD, ICM LVEF 30% s/p CRT-D, severe MR, APL with hx of DVT/PE on chronic anticoagulation with warfarin, hx of COVID-19 1/2022 (was hospitalized, not intubated), HTN, and HLD     I: Monitored vitals and assessed pt status. MD notified regarding soft BP. Ordered parameters for captopril-MN dose held.  Changed: Clear liquid diet today for colonoscopy prep  Running: PIV SL'd  PRN: Atarax, Ativan, Ambien    A: A0x4. VSS except soft BP, on RA. Monitor SR  w/up to 10 PAC/min earlier, now 0-2 PAC/min, 0-1 PAC. Afebrile. Denies c/o pain. Good UO. Appeared to sleep well after ativan and ambien.    I/O this shift:  In: -   Out: 975 [Urine:975]    Temp:  [97.4  F (36.3  C)-98  F (36.7  C)] 97.6  F (36.4  C)  Pulse:  [83-97] 88  Resp:  [16-18] 16  BP: (79-96)/(51-71) 95/66  SpO2:  [96 %-99 %] 98 %      P: Continue to monitor Pt status and report changes to treatment team. Pt to start golytely prep this afternoon.

## 2022-05-22 NOTE — CONSULTS
"      Initial Psychiatric Consult   Consult date: May 22, 2022         Reason for Consult, requesting source:    Hx of anxiety treated PTA with PRN Benzos, pt has been seen by Health Psychology  for evaluation and management of anxiety     Requesting source: [facility]    Labs and imaging reviewed. Patient seen and evaluated by YAO Cuevas CNP          HPI:   Per H&P: Dandy Sands is a 61 year old male with history of CAD s/p remote PCI to LAD, ICM LVEF 30% s/p CRT-D, severe MR, APL with hx of DVT/PE on chronic anticoagulation with warfarin, hx of COVID-19 2022 (was hospitalized, not intubated), HTN, and HLD who presented to [facility] as a transfer from [facility] in [place] for evaluation of multivessel coronary artery disease and moderate-severe functional mitral regurgitation. Pt has had severe anxiety here in the hospital and has been getting PRN Atarax and Ativan. Received a total of Ativan 1mg and Atarax 50mg yesterday and has been working with health psychology.     Upon psychiatric assessment, pt was sitting at the end of the freitas with his daughter and her wife. Pt was pleasant and oriented. Denied any symptoms of depression. Endorses feeling \"extra anxious\" this hospitalization including feeling tense, keyed up, and worried as his parents  during hospitalization. Pt endorses having a heightened level of anxiety prior to hospitalization as well, however this hospitalization and being far from home has exacerbated his anxiety. Pt also states he does not sleep well, roommate here has been snoring, and at home he wakes up about every 3 hours. Daughter endorses heavy snoring, pt was planning on having a sleep study at home but it was postponed due to this hospitalization. Denies SI/HI/AVH.         Past Psychiatric History:   He reports no past history of anxiety disorder diagnosis or depressive disorders.  He currently reports mood is not depressed, primarily feeling anxious.  " He reports no history of psychotherapy or regular psychotropic medication use.          Substance Use and History:   Denies recent drug and alcohol use.     No history of chemical dependency treatments.         Past Medical History:   PAST MEDICAL HISTORY: No past medical history on file.    PAST SURGICAL HISTORY:   Past Surgical History:   Procedure Laterality Date     CV RIGHT HEART CATH MEASUREMENTS RECORDED N/A 5/18/2022    Procedure: Right Heart Catheterization;  Surgeon: Justo Stewart MD;  Location:  HEART CARDIAC CATH LAB     CV RIGHT HEART EXERCISE STRESS STUDY N/A 5/18/2022    Procedure: Stress Drug Study;  Surgeon: Justo Stewart MD;  Location:  HEART CARDIAC CATH LAB             Family History:   FAMILY HISTORY: No family history on file.    Family Psychiatric History: denies        Social History:   SOCIAL HISTORY:   Social History     Tobacco Use     Smoking status: Not on file     Smokeless tobacco: Not on file   Substance Use Topics     Alcohol use: Not on file       Is currently employed as a  on medical leave from work.  Uncertain about his ability to return to work although he acknowledges it would be okay if he were to retire financially.  Has 2 adult children who provide support from him lives at home alone with his catRubens in [place]         Physical ROS:   The 10 point Review of Systems is negative other than noted in the HPI or here.           Medications:       aspirin  81 mg Oral Daily     atorvastatin  40 mg Oral Daily     captopril  12.5 mg Oral TID     enoxaparin ANTICOAGULANT  1 mg/kg Subcutaneous Q12H     famotidine  20 mg Oral BID     ferrous sulfate  325 mg Oral Daily with lunch     hydroxychloroquine  200 mg Oral BID     melatonin  5 mg Oral QPM     multivitamin w/minerals  1 tablet Oral Daily with lunch     polyethylene glycol  4,000 mL Oral Once     [Held by provider] potassium chloride  20 mEq Oral BID     tamsulosin  0.4 mg Oral Daily     thiamine  100  mg Oral Daily              Allergies:   No Known Allergies       Labs:     Recent Results (from the past 48 hour(s))   Blood gas venous with oxyhemoglobin    Collection Time: 05/20/22  1:39 PM   Result Value Ref Range    pH Venous 7.44 (H) 7.32 - 7.43    pCO2 Venous 34 (L) 40 - 50 mm Hg    pO2 Venous 28 25 - 47 mm Hg    Bicarbonate Venous 23 21 - 28 mmol/L    FIO2 21     Oxyhemoglobin Venous 45 (L) 70 - 75 %    Base Excess/Deficit (+/-) -0.4 -7.7 - 1.9 mmol/L   Blood gas venous with oxyhemoglobin    Collection Time: 05/20/22  4:42 PM   Result Value Ref Range    pH Venous 7.44 (H) 7.32 - 7.43    pCO2 Venous 33 (L) 40 - 50 mm Hg    pO2 Venous 29 25 - 47 mm Hg    Bicarbonate Venous 22 21 - 28 mmol/L    FIO2 21     Oxyhemoglobin Venous 48 (L) 70 - 75 %    Base Excess/Deficit (+/-) -1.5 -7.7 - 1.9 mmol/L   Comprehensive metabolic panel    Collection Time: 05/20/22  5:15 PM   Result Value Ref Range    Sodium 136 133 - 144 mmol/L    Potassium 4.3 3.4 - 5.3 mmol/L    Chloride 105 94 - 109 mmol/L    Carbon Dioxide (CO2) 22 20 - 32 mmol/L    Anion Gap 9 3 - 14 mmol/L    Urea Nitrogen 13 7 - 30 mg/dL    Creatinine 0.87 0.66 - 1.25 mg/dL    Calcium 8.9 8.5 - 10.1 mg/dL    Glucose 96 70 - 99 mg/dL    Alkaline Phosphatase 71 40 - 150 U/L    AST 26 0 - 45 U/L     (H) 0 - 70 U/L    Protein Total 6.6 (L) 6.8 - 8.8 g/dL    Albumin 3.2 (L) 3.4 - 5.0 g/dL    Bilirubin Total 0.5 0.2 - 1.3 mg/dL    GFR Estimate >90 >60 mL/min/1.73m2   Magnesium    Collection Time: 05/21/22  4:33 AM   Result Value Ref Range    Magnesium 2.4 (H) 1.6 - 2.3 mg/dL   INR    Collection Time: 05/21/22  4:33 AM   Result Value Ref Range    INR 1.17 (H) 0.85 - 1.15   Comprehensive metabolic panel    Collection Time: 05/21/22  4:33 AM   Result Value Ref Range    Sodium 136 133 - 144 mmol/L    Potassium 4.6 3.4 - 5.3 mmol/L    Chloride 108 94 - 109 mmol/L    Carbon Dioxide (CO2) 20 20 - 32 mmol/L    Anion Gap 8 3 - 14 mmol/L    Urea Nitrogen 15 7 - 30 mg/dL     Creatinine 0.72 0.66 - 1.25 mg/dL    Calcium 9.2 8.5 - 10.1 mg/dL    Glucose 82 70 - 99 mg/dL    Alkaline Phosphatase 71 40 - 150 U/L    AST 21 0 - 45 U/L     (H) 0 - 70 U/L    Protein Total 6.7 (L) 6.8 - 8.8 g/dL    Albumin 3.2 (L) 3.4 - 5.0 g/dL    Bilirubin Total 0.6 0.2 - 1.3 mg/dL    GFR Estimate >90 >60 mL/min/1.73m2   Comprehensive metabolic panel    Collection Time: 05/21/22  6:33 PM   Result Value Ref Range    Sodium 138 133 - 144 mmol/L    Potassium 3.8 3.4 - 5.3 mmol/L    Chloride 105 94 - 109 mmol/L    Carbon Dioxide (CO2) 23 20 - 32 mmol/L    Anion Gap 10 3 - 14 mmol/L    Urea Nitrogen 18 7 - 30 mg/dL    Creatinine 0.87 0.66 - 1.25 mg/dL    Calcium 8.7 8.5 - 10.1 mg/dL    Glucose 93 70 - 99 mg/dL    Alkaline Phosphatase 78 40 - 150 U/L    AST 22 0 - 45 U/L    ALT 99 (H) 0 - 70 U/L    Protein Total 7.4 6.8 - 8.8 g/dL    Albumin 3.4 3.4 - 5.0 g/dL    Bilirubin Total 0.5 0.2 - 1.3 mg/dL    GFR Estimate >90 >60 mL/min/1.73m2   Magnesium    Collection Time: 05/22/22  7:12 AM   Result Value Ref Range    Magnesium 2.3 1.6 - 2.3 mg/dL   INR    Collection Time: 05/22/22  7:12 AM   Result Value Ref Range    INR 1.13 0.85 - 1.15   Comprehensive metabolic panel    Collection Time: 05/22/22  7:12 AM   Result Value Ref Range    Sodium 135 133 - 144 mmol/L    Potassium 4.4 3.4 - 5.3 mmol/L    Chloride 105 94 - 109 mmol/L    Carbon Dioxide (CO2) 19 (L) 20 - 32 mmol/L    Anion Gap 11 3 - 14 mmol/L    Urea Nitrogen 16 7 - 30 mg/dL    Creatinine 0.94 0.66 - 1.25 mg/dL    Calcium 9.5 8.5 - 10.1 mg/dL    Glucose 82 70 - 99 mg/dL    Alkaline Phosphatase 72 40 - 150 U/L    AST 26 0 - 45 U/L    ALT 86 (H) 0 - 70 U/L    Protein Total 7.3 6.8 - 8.8 g/dL    Albumin 3.4 3.4 - 5.0 g/dL    Bilirubin Total 0.5 0.2 - 1.3 mg/dL    GFR Estimate >90 >60 mL/min/1.73m2   CBC with platelets and differential    Collection Time: 05/22/22  7:12 AM   Result Value Ref Range    WBC Count 5.7 4.0 - 11.0 10e3/uL    RBC Count 3.47 (L) 4.40 -  "5.90 10e6/uL    Hemoglobin 9.8 (L) 13.3 - 17.7 g/dL    Hematocrit 32.7 (L) 40.0 - 53.0 %    MCV 94 78 - 100 fL    MCH 28.2 26.5 - 33.0 pg    MCHC 30.0 (L) 31.5 - 36.5 g/dL    RDW 18.7 (H) 10.0 - 15.0 %    Platelet Count 274 150 - 450 10e3/uL    % Neutrophils 68 %    % Lymphocytes 13 %    % Monocytes 15 %    % Eosinophils 2 %    % Basophils 1 %    % Immature Granulocytes 1 %    NRBCs per 100 WBC 0 <1 /100    Absolute Neutrophils 3.9 1.6 - 8.3 10e3/uL    Absolute Lymphocytes 0.7 (L) 0.8 - 5.3 10e3/uL    Absolute Monocytes 0.8 0.0 - 1.3 10e3/uL    Absolute Eosinophils 0.1 0.0 - 0.7 10e3/uL    Absolute Basophils 0.0 0.0 - 0.2 10e3/uL    Absolute Immature Granulocytes 0.1 <=0.4 10e3/uL    Absolute NRBCs 0.0 10e3/uL          Physical and Psychiatric Examination:     BP 96/72 (BP Location: Right arm)   Pulse 97   Temp 97.4  F (36.3  C) (Oral)   Resp 16   Ht 1.715 m (5' 7.5\")   Wt 66.2 kg (146 lb)   SpO2 99%   BMI 22.53 kg/m    Weight is 146 lbs 0 oz  Body mass index is 22.53 kg/m .    Physical Exam:  I have reviewed the physical exam as documented by by the medical team and agree with findings and assessment and have no additional findings to add at this time.    Mental Status Exam:    Appearance: awake, alert, adequately groomed and dressed in hospital scrubs  Attitude:  cooperative  Eye Contact:  good  Mood:  anxious  Affect:  appropriate and in normal range and mood congruent  Speech:  clear, coherent  Language: Fluent in english   Psychomotor Behavior:  no evidence of tardive dyskinesia, dystonia, or tics  Thought Process:  logical, linear and goal oriented  Associations:  no loose associations  Thought Content:  no evidence of suicidal ideation or homicidal ideation and no evidence of psychotic thought  Insight:  good  Judgement:  intact  Oriented to:  time, person, and place  Attention Span and Concentration:  intact  Recent and Remote Memory:  intact  Fund of Knowledge: Appropriate   Gait and Station: intact, " steady                DSM-5 Diagnosis:   1. Anxiety due to medical condition           Assessment:   Pt is a 61 year old male who presents from [place] for further cardiac work-up and has had significant anxiety in the context of his medical conditions and hospitalizations. Pt has not been on an antidepressant to treat anxiety, engaged in discussion regarding daily medication to treat anxiety and agrees to a trial of Cymbalta. QTc 516 on 5/18/22, research shows Cymbalta to be neutral on QTc. May continue anxiety prn's short term while in the hospital. Will not add any sleep medication at this time as it appears likely that pts insomnia is biologically based (possibly sleep apnea as daughter reports heavy snoring) and he recently missed his sleep study appointment for this hospitalization.           Summary of Recommendations:     1. Started Cymbalta 30mg once daily.     2. May continue Anxiety PRN's while here in the hospital.         Stephanie Garcia, PMERIKP-BC  Consult/Liaison Psychiatry   [facility]

## 2022-05-23 ENCOUNTER — ANESTHESIA (OUTPATIENT)
Dept: SURGERY | Facility: CLINIC | Age: 62
DRG: 246 | End: 2022-05-23
Payer: COMMERCIAL

## 2022-05-23 LAB
ABO/RH(D): NORMAL
ALBUMIN SERPL-MCNC: 3.1 G/DL (ref 3.4–5)
ALBUMIN SERPL-MCNC: 3.1 G/DL (ref 3.4–5)
ALP SERPL-CCNC: 67 U/L (ref 40–150)
ALP SERPL-CCNC: 69 U/L (ref 40–150)
ALT SERPL W P-5'-P-CCNC: 59 U/L (ref 0–70)
ALT SERPL W P-5'-P-CCNC: 63 U/L (ref 0–70)
ANION GAP SERPL CALCULATED.3IONS-SCNC: 8 MMOL/L (ref 3–14)
ANION GAP SERPL CALCULATED.3IONS-SCNC: 9 MMOL/L (ref 3–14)
ANTIBODY SCREEN: NEGATIVE
AST SERPL W P-5'-P-CCNC: 15 U/L (ref 0–45)
AST SERPL W P-5'-P-CCNC: 18 U/L (ref 0–45)
BASOPHILS # BLD AUTO: 0 10E3/UL (ref 0–0.2)
BASOPHILS NFR BLD AUTO: 1 %
BILIRUB SERPL-MCNC: 0.6 MG/DL (ref 0.2–1.3)
BILIRUB SERPL-MCNC: 0.6 MG/DL (ref 0.2–1.3)
BUN SERPL-MCNC: 11 MG/DL (ref 7–30)
BUN SERPL-MCNC: 11 MG/DL (ref 7–30)
C3 SERPL-MCNC: 95 MG/DL (ref 81–157)
C4 SERPL-MCNC: 21 MG/DL (ref 13–39)
CALCIUM SERPL-MCNC: 8.6 MG/DL (ref 8.5–10.1)
CALCIUM SERPL-MCNC: 8.8 MG/DL (ref 8.5–10.1)
CELL TYPE AUTO: NORMAL
CHANNELSHIFTAUTOB1: -33
CHANNELSHIFTAUTOT1: -20
CHLORIDE BLD-SCNC: 103 MMOL/L (ref 94–109)
CHLORIDE BLD-SCNC: 106 MMOL/L (ref 94–109)
CO2 SERPL-SCNC: 23 MMOL/L (ref 20–32)
CO2 SERPL-SCNC: 23 MMOL/L (ref 20–32)
COLONOSCOPY: NORMAL
COTININE SERPL-MCNC: <2 NG/ML
CREAT SERPL-MCNC: 0.95 MG/DL (ref 0.66–1.25)
CREAT SERPL-MCNC: 0.97 MG/DL (ref 0.66–1.25)
CROSSMATCHDATEAUTO: NORMAL
DONOR AUTO: NORMAL
DONORCELLDATE AUTO: NORMAL
EOSINOPHIL # BLD AUTO: 0.1 10E3/UL (ref 0–0.7)
EOSINOPHIL NFR BLD AUTO: 1 %
ERYTHROCYTE [DISTWIDTH] IN BLOOD BY AUTOMATED COUNT: 18 % (ref 10–15)
GFR SERPL CREATININE-BSD FRML MDRD: 89 ML/MIN/1.73M2
GFR SERPL CREATININE-BSD FRML MDRD: >90 ML/MIN/1.73M2
GLUCOSE BLD-MCNC: 85 MG/DL (ref 70–99)
GLUCOSE BLD-MCNC: 88 MG/DL (ref 70–99)
HCT VFR BLD AUTO: 27.7 % (ref 40–53)
HGB BLD-MCNC: 8.7 G/DL (ref 13.3–17.7)
IMM GRANULOCYTES # BLD: 0.1 10E3/UL
IMM GRANULOCYTES NFR BLD: 1 %
INR PPP: 1.19 (ref 0.85–1.15)
LYMPHOCYTES # BLD AUTO: 0.4 10E3/UL (ref 0.8–5.3)
LYMPHOCYTES NFR BLD AUTO: 11 %
MAGNESIUM SERPL-MCNC: 2 MG/DL (ref 1.6–2.3)
MCH RBC QN AUTO: 28.6 PG (ref 26.5–33)
MCHC RBC AUTO-ENTMCNC: 31.4 G/DL (ref 31.5–36.5)
MCV RBC AUTO: 91 FL (ref 78–100)
MONOCYTES # BLD AUTO: 0.5 10E3/UL (ref 0–1.3)
MONOCYTES NFR BLD AUTO: 13 %
NEUTROPHILS # BLD AUTO: 3 10E3/UL (ref 1.6–8.3)
NEUTROPHILS NFR BLD AUTO: 73 %
NICOTINE SERPL-MCNC: <2 NG/ML
NRBC # BLD AUTO: 0 10E3/UL
NRBC BLD AUTO-RTO: 0 /100
PLATELET # BLD AUTO: 251 10E3/UL (ref 150–450)
POS CUT OFF AUTO B: >93
POS CUT OFF AUTO T: >79
POTASSIUM BLD-SCNC: 3.4 MMOL/L (ref 3.4–5.3)
POTASSIUM BLD-SCNC: 4 MMOL/L (ref 3.4–5.3)
PROT SERPL-MCNC: 6.5 G/DL (ref 6.8–8.8)
PROT SERPL-MCNC: 6.6 G/DL (ref 6.8–8.8)
RBC # BLD AUTO: 3.04 10E6/UL (ref 4.4–5.9)
RESULT AUTO B1: NORMAL
RESULT AUTO T1: NORMAL
SERUM DATE AUTO B1: NORMAL
SERUM DATE AUTO T1: NORMAL
SODIUM SERPL-SCNC: 134 MMOL/L (ref 133–144)
SODIUM SERPL-SCNC: 138 MMOL/L (ref 133–144)
SPECIMEN EXPIRATION DATE: NORMAL
TESTMETHODAUTO: NORMAL
TRANS-3-OH-COTININE SERPL-MCNC: <2 NG/ML
TREATMENT AUTO B1: NORMAL
TREATMENT AUTO T1: NORMAL
WBC # BLD AUTO: 4.1 10E3/UL (ref 4–11)

## 2022-05-23 PROCEDURE — 250N000009 HC RX 250: Performed by: INTERNAL MEDICINE

## 2022-05-23 PROCEDURE — 86850 RBC ANTIBODY SCREEN: CPT | Performed by: PHYSICIAN ASSISTANT

## 2022-05-23 PROCEDURE — 999N000141 HC STATISTIC PRE-PROCEDURE NURSING ASSESSMENT: Performed by: INTERNAL MEDICINE

## 2022-05-23 PROCEDURE — 272N000001 HC OR GENERAL SUPPLY STERILE: Performed by: INTERNAL MEDICINE

## 2022-05-23 PROCEDURE — 85025 COMPLETE CBC W/AUTO DIFF WBC: CPT

## 2022-05-23 PROCEDURE — 250N000009 HC RX 250: Performed by: NURSE ANESTHETIST, CERTIFIED REGISTERED

## 2022-05-23 PROCEDURE — 370N000017 HC ANESTHESIA TECHNICAL FEE, PER MIN: Performed by: INTERNAL MEDICINE

## 2022-05-23 PROCEDURE — 99232 SBSQ HOSP IP/OBS MODERATE 35: CPT | Mod: GC | Performed by: INTERNAL MEDICINE

## 2022-05-23 PROCEDURE — 80053 COMPREHEN METABOLIC PANEL: CPT | Performed by: STUDENT IN AN ORGANIZED HEALTH CARE EDUCATION/TRAINING PROGRAM

## 2022-05-23 PROCEDURE — 250N000013 HC RX MED GY IP 250 OP 250 PS 637

## 2022-05-23 PROCEDURE — 250N000013 HC RX MED GY IP 250 OP 250 PS 637: Performed by: INTERNAL MEDICINE

## 2022-05-23 PROCEDURE — 84450 TRANSFERASE (AST) (SGOT): CPT | Performed by: STUDENT IN AN ORGANIZED HEALTH CARE EDUCATION/TRAINING PROGRAM

## 2022-05-23 PROCEDURE — 250N000011 HC RX IP 250 OP 636: Performed by: NURSE ANESTHETIST, CERTIFIED REGISTERED

## 2022-05-23 PROCEDURE — 85610 PROTHROMBIN TIME: CPT

## 2022-05-23 PROCEDURE — 250N000012 HC RX MED GY IP 250 OP 636 PS 637: Performed by: STUDENT IN AN ORGANIZED HEALTH CARE EDUCATION/TRAINING PROGRAM

## 2022-05-23 PROCEDURE — 250N000013 HC RX MED GY IP 250 OP 250 PS 637: Performed by: STUDENT IN AN ORGANIZED HEALTH CARE EDUCATION/TRAINING PROGRAM

## 2022-05-23 PROCEDURE — 258N000003 HC RX IP 258 OP 636: Performed by: NURSE ANESTHETIST, CERTIFIED REGISTERED

## 2022-05-23 PROCEDURE — 83735 ASSAY OF MAGNESIUM: CPT

## 2022-05-23 PROCEDURE — 36415 COLL VENOUS BLD VENIPUNCTURE: CPT | Performed by: STUDENT IN AN ORGANIZED HEALTH CARE EDUCATION/TRAINING PROGRAM

## 2022-05-23 PROCEDURE — 214N000001 HC R&B CCU UMMC

## 2022-05-23 PROCEDURE — 250N000011 HC RX IP 250 OP 636: Performed by: INTERNAL MEDICINE

## 2022-05-23 PROCEDURE — 84155 ASSAY OF PROTEIN SERUM: CPT | Performed by: STUDENT IN AN ORGANIZED HEALTH CARE EDUCATION/TRAINING PROGRAM

## 2022-05-23 PROCEDURE — 360N000076 HC SURGERY LEVEL 3, PER MIN: Performed by: INTERNAL MEDICINE

## 2022-05-23 PROCEDURE — 250N000011 HC RX IP 250 OP 636

## 2022-05-23 PROCEDURE — 0DJD8ZZ INSPECTION OF LOWER INTESTINAL TRACT, VIA NATURAL OR ARTIFICIAL OPENING ENDOSCOPIC: ICD-10-PCS | Performed by: INTERNAL MEDICINE

## 2022-05-23 PROCEDURE — 86901 BLOOD TYPING SEROLOGIC RH(D): CPT | Performed by: PHYSICIAN ASSISTANT

## 2022-05-23 RX ORDER — PHENYLEPHRINE HCL IN 0.9% NACL 50MG/250ML
.1-6 PLASTIC BAG, INJECTION (ML) INTRAVENOUS CONTINUOUS
Status: CANCELLED | OUTPATIENT
Start: 2022-05-23

## 2022-05-23 RX ORDER — NALOXONE HYDROCHLORIDE 0.4 MG/ML
0.4 INJECTION, SOLUTION INTRAMUSCULAR; INTRAVENOUS; SUBCUTANEOUS
Status: DISCONTINUED | OUTPATIENT
Start: 2022-05-23 | End: 2022-05-26 | Stop reason: HOSPADM

## 2022-05-23 RX ORDER — POTASSIUM CHLORIDE 7.45 MG/ML
10 INJECTION INTRAVENOUS
Status: CANCELLED | OUTPATIENT
Start: 2022-05-23 | End: 2022-05-23

## 2022-05-23 RX ORDER — PROPOFOL 10 MG/ML
INJECTION, EMULSION INTRAVENOUS CONTINUOUS PRN
Status: DISCONTINUED | OUTPATIENT
Start: 2022-05-23 | End: 2022-05-23

## 2022-05-23 RX ORDER — SODIUM CHLORIDE, SODIUM LACTATE, POTASSIUM CHLORIDE, CALCIUM CHLORIDE 600; 310; 30; 20 MG/100ML; MG/100ML; MG/100ML; MG/100ML
INJECTION, SOLUTION INTRAVENOUS CONTINUOUS
Status: CANCELLED | OUTPATIENT
Start: 2022-05-23

## 2022-05-23 RX ORDER — MEPERIDINE HYDROCHLORIDE 25 MG/ML
12.5 INJECTION INTRAMUSCULAR; INTRAVENOUS; SUBCUTANEOUS EVERY 5 MIN PRN
Status: CANCELLED | OUTPATIENT
Start: 2022-05-23

## 2022-05-23 RX ORDER — LIDOCAINE 40 MG/G
CREAM TOPICAL
Status: DISCONTINUED | OUTPATIENT
Start: 2022-05-23 | End: 2022-05-23 | Stop reason: HOSPADM

## 2022-05-23 RX ORDER — DIAZEPAM 10 MG/2ML
2.5 INJECTION, SOLUTION INTRAMUSCULAR; INTRAVENOUS
Status: CANCELLED | OUTPATIENT
Start: 2022-05-23

## 2022-05-23 RX ORDER — ONDANSETRON 2 MG/ML
4 INJECTION INTRAMUSCULAR; INTRAVENOUS EVERY 30 MIN PRN
Status: CANCELLED | OUTPATIENT
Start: 2022-05-23

## 2022-05-23 RX ORDER — VANCOMYCIN HYDROCHLORIDE 1 G/200ML
1000 INJECTION, SOLUTION INTRAVENOUS
Status: CANCELLED | OUTPATIENT
Start: 2022-05-23

## 2022-05-23 RX ORDER — LABETALOL HYDROCHLORIDE 5 MG/ML
10 INJECTION, SOLUTION INTRAVENOUS
Status: CANCELLED | OUTPATIENT
Start: 2022-05-23

## 2022-05-23 RX ORDER — SODIUM CHLORIDE, SODIUM GLUCONATE, SODIUM ACETATE, POTASSIUM CHLORIDE AND MAGNESIUM CHLORIDE 526; 502; 368; 37; 30 MG/100ML; MG/100ML; MG/100ML; MG/100ML; MG/100ML
INJECTION, SOLUTION INTRAVENOUS CONTINUOUS PRN
Status: DISCONTINUED | OUTPATIENT
Start: 2022-05-23 | End: 2022-05-23

## 2022-05-23 RX ORDER — ONDANSETRON 2 MG/ML
4 INJECTION INTRAMUSCULAR; INTRAVENOUS
Status: COMPLETED | OUTPATIENT
Start: 2022-05-23 | End: 2022-05-23

## 2022-05-23 RX ORDER — LIDOCAINE 40 MG/G
CREAM TOPICAL
Status: CANCELLED | OUTPATIENT
Start: 2022-05-23

## 2022-05-23 RX ORDER — ONDANSETRON 4 MG/1
4 TABLET, ORALLY DISINTEGRATING ORAL EVERY 30 MIN PRN
Status: CANCELLED | OUTPATIENT
Start: 2022-05-23

## 2022-05-23 RX ORDER — CEFAZOLIN SODIUM 2 G/100ML
2 INJECTION, SOLUTION INTRAVENOUS
Status: CANCELLED | OUTPATIENT
Start: 2022-05-23

## 2022-05-23 RX ORDER — NALOXONE HYDROCHLORIDE 0.4 MG/ML
0.2 INJECTION, SOLUTION INTRAMUSCULAR; INTRAVENOUS; SUBCUTANEOUS
Status: DISCONTINUED | OUTPATIENT
Start: 2022-05-23 | End: 2022-05-26 | Stop reason: HOSPADM

## 2022-05-23 RX ORDER — CHLORHEXIDINE GLUCONATE ORAL RINSE 1.2 MG/ML
10 SOLUTION DENTAL ONCE
Status: CANCELLED | OUTPATIENT
Start: 2022-05-23 | End: 2022-05-23

## 2022-05-23 RX ORDER — DIPHENHYDRAMINE HYDROCHLORIDE 50 MG/ML
25 INJECTION INTRAMUSCULAR; INTRAVENOUS EVERY 6 HOURS PRN
Status: CANCELLED | OUTPATIENT
Start: 2022-05-23

## 2022-05-23 RX ORDER — FENTANYL CITRATE 50 UG/ML
25 INJECTION, SOLUTION INTRAMUSCULAR; INTRAVENOUS EVERY 5 MIN PRN
Status: CANCELLED | OUTPATIENT
Start: 2022-05-23

## 2022-05-23 RX ORDER — ACETAMINOPHEN 325 MG/1
975 TABLET ORAL ONCE
Status: CANCELLED | OUTPATIENT
Start: 2022-05-23 | End: 2022-05-23

## 2022-05-23 RX ORDER — HYDRALAZINE HYDROCHLORIDE 20 MG/ML
2.5-5 INJECTION INTRAMUSCULAR; INTRAVENOUS EVERY 10 MIN PRN
Status: CANCELLED | OUTPATIENT
Start: 2022-05-23

## 2022-05-23 RX ORDER — MYCOPHENOLATE MOFETIL 500 MG/1
1500 TABLET ORAL 2 TIMES DAILY
Status: DISCONTINUED | OUTPATIENT
Start: 2022-05-23 | End: 2022-05-26 | Stop reason: HOSPADM

## 2022-05-23 RX ORDER — ASPIRIN 81 MG/1
81 TABLET, CHEWABLE ORAL
Status: CANCELLED | OUTPATIENT
Start: 2022-05-23

## 2022-05-23 RX ORDER — CEFAZOLIN SODIUM 2 G/100ML
2 INJECTION, SOLUTION INTRAVENOUS SEE ADMIN INSTRUCTIONS
Status: CANCELLED | OUTPATIENT
Start: 2022-05-23

## 2022-05-23 RX ORDER — ASPIRIN 81 MG/1
162 TABLET, CHEWABLE ORAL
Status: CANCELLED | OUTPATIENT
Start: 2022-05-23

## 2022-05-23 RX ORDER — PHENYLEPHRINE HCL IN 0.9% NACL 50MG/250ML
.1-1 PLASTIC BAG, INJECTION (ML) INTRAVENOUS CONTINUOUS
Status: DISCONTINUED | OUTPATIENT
Start: 2022-05-23 | End: 2022-05-23 | Stop reason: HOSPADM

## 2022-05-23 RX ORDER — DIPHENHYDRAMINE HCL 25 MG
25 CAPSULE ORAL EVERY 6 HOURS PRN
Status: CANCELLED | OUTPATIENT
Start: 2022-05-23

## 2022-05-23 RX ORDER — HYDROMORPHONE HCL IN WATER/PF 6 MG/30 ML
0.2 PATIENT CONTROLLED ANALGESIA SYRINGE INTRAVENOUS EVERY 5 MIN PRN
Status: CANCELLED | OUTPATIENT
Start: 2022-05-23

## 2022-05-23 RX ORDER — FUROSEMIDE 40 MG
40 TABLET ORAL
Status: DISCONTINUED | OUTPATIENT
Start: 2022-05-23 | End: 2022-05-25

## 2022-05-23 RX ORDER — FAMOTIDINE 20 MG/1
20 TABLET, FILM COATED ORAL
Status: CANCELLED | OUTPATIENT
Start: 2022-05-23

## 2022-05-23 RX ORDER — POTASSIUM CHLORIDE 1500 MG/1
40 TABLET, EXTENDED RELEASE ORAL ONCE
Status: COMPLETED | OUTPATIENT
Start: 2022-05-23 | End: 2022-05-23

## 2022-05-23 RX ORDER — HALOPERIDOL 5 MG/ML
1 INJECTION INTRAMUSCULAR
Status: CANCELLED | OUTPATIENT
Start: 2022-05-23

## 2022-05-23 RX ORDER — DEXMEDETOMIDINE HYDROCHLORIDE 4 UG/ML
.1-1.2 INJECTION, SOLUTION INTRAVENOUS CONTINUOUS
Status: CANCELLED | OUTPATIENT
Start: 2022-05-23

## 2022-05-23 RX ORDER — SODIUM CHLORIDE, SODIUM LACTATE, POTASSIUM CHLORIDE, CALCIUM CHLORIDE 600; 310; 30; 20 MG/100ML; MG/100ML; MG/100ML; MG/100ML
INJECTION, SOLUTION INTRAVENOUS CONTINUOUS
Status: DISCONTINUED | OUTPATIENT
Start: 2022-05-23 | End: 2022-05-23 | Stop reason: SINTOL

## 2022-05-23 RX ORDER — GABAPENTIN 300 MG/1
300 CAPSULE ORAL
Status: CANCELLED | OUTPATIENT
Start: 2022-05-23

## 2022-05-23 RX ORDER — OXYCODONE HYDROCHLORIDE 5 MG/1
5 TABLET ORAL EVERY 4 HOURS PRN
Status: DISCONTINUED | OUTPATIENT
Start: 2022-05-23 | End: 2022-05-24 | Stop reason: HOSPADM

## 2022-05-23 RX ORDER — LIDOCAINE HYDROCHLORIDE 20 MG/ML
INJECTION, SOLUTION INFILTRATION; PERINEURAL PRN
Status: DISCONTINUED | OUTPATIENT
Start: 2022-05-23 | End: 2022-05-23

## 2022-05-23 RX ADMIN — PROPOFOL 50 MCG/KG/MIN: 10 INJECTION, EMULSION INTRAVENOUS at 08:10

## 2022-05-23 RX ADMIN — LORAZEPAM 1 MG: 0.5 TABLET ORAL at 01:06

## 2022-05-23 RX ADMIN — HYDROXYZINE HYDROCHLORIDE 50 MG: 50 TABLET ORAL at 20:12

## 2022-05-23 RX ADMIN — POTASSIUM CHLORIDE 20 MEQ: 750 TABLET, EXTENDED RELEASE ORAL at 20:11

## 2022-05-23 RX ADMIN — Medication 5 MG: at 20:11

## 2022-05-23 RX ADMIN — PHENYLEPHRINE HYDROCHLORIDE 100 MCG: 10 INJECTION INTRAVENOUS at 08:05

## 2022-05-23 RX ADMIN — LIDOCAINE HYDROCHLORIDE 100 MG: 20 INJECTION, SOLUTION INFILTRATION; PERINEURAL at 08:06

## 2022-05-23 RX ADMIN — POTASSIUM CHLORIDE 40 MEQ: 1500 TABLET, EXTENDED RELEASE ORAL at 13:46

## 2022-05-23 RX ADMIN — ZOLPIDEM TARTRATE 5 MG: 5 TABLET, FILM COATED ORAL at 22:17

## 2022-05-23 RX ADMIN — FUROSEMIDE 40 MG: 40 TABLET ORAL at 16:24

## 2022-05-23 RX ADMIN — MYCOPHENOLATE MOFETIL 1500 MG: 500 TABLET, FILM COATED ORAL at 12:23

## 2022-05-23 RX ADMIN — Medication 1 TABLET: at 12:23

## 2022-05-23 RX ADMIN — Medication 0.5 MCG/KG/MIN: at 08:04

## 2022-05-23 RX ADMIN — SODIUM CHLORIDE, SODIUM GLUCONATE, SODIUM ACETATE, POTASSIUM CHLORIDE AND MAGNESIUM CHLORIDE: 526; 502; 368; 37; 30 INJECTION, SOLUTION INTRAVENOUS at 08:04

## 2022-05-23 RX ADMIN — ONDANSETRON 4 MG: 2 INJECTION INTRAMUSCULAR; INTRAVENOUS at 08:30

## 2022-05-23 RX ADMIN — CAPTOPRIL 12.5 MG: 12.5 TABLET ORAL at 13:46

## 2022-05-23 RX ADMIN — FERROUS SULFATE TAB 325 MG (65 MG ELEMENTAL FE) 325 MG: 325 (65 FE) TAB at 12:23

## 2022-05-23 RX ADMIN — MIDAZOLAM 2 MG: 1 INJECTION INTRAMUSCULAR; INTRAVENOUS at 08:05

## 2022-05-23 RX ADMIN — ENOXAPARIN SODIUM 70 MG: 80 INJECTION SUBCUTANEOUS at 12:26

## 2022-05-23 RX ADMIN — MYCOPHENOLATE MOFETIL 1500 MG: 500 TABLET, FILM COATED ORAL at 20:11

## 2022-05-23 RX ADMIN — CAPTOPRIL 12.5 MG: 12.5 TABLET ORAL at 20:11

## 2022-05-23 RX ADMIN — LORAZEPAM 1 MG: 0.5 TABLET ORAL at 22:16

## 2022-05-23 RX ADMIN — ENOXAPARIN SODIUM 70 MG: 80 INJECTION SUBCUTANEOUS at 22:17

## 2022-05-23 RX ADMIN — PHENYLEPHRINE HYDROCHLORIDE 200 MCG: 10 INJECTION INTRAVENOUS at 08:22

## 2022-05-23 RX ADMIN — FAMOTIDINE 20 MG: 20 TABLET ORAL at 20:11

## 2022-05-23 RX ADMIN — HYDROXYCHLOROQUINE SULFATE 200 MG: 200 TABLET, FILM COATED ORAL at 20:11

## 2022-05-23 ASSESSMENT — ACTIVITIES OF DAILY LIVING (ADL)
ADLS_ACUITY_SCORE: 23

## 2022-05-23 NOTE — PLAN OF CARE
"D: pt admitted on 5/16 from OSH for evaluation of multiple vessel CAD and moderate to severe functional mitral regurgitation. Found to have dilated left ventrical with LVEF 10-20% making him high risk surgical candidate. Now having LVAD/transplant workup.  PMH of CAD s/p remote PCI to LAD, ICM LVEF 30% s/p CRT-D, severe mitral regurgitation, APL with history of DVT/PE on chronic anticoagulation with warfarin, hx of COVID-19 (1/22) (was hospitalized, not intubated), HTN, HLD     I: Monitored vitals and assessed pt status.   Changes during this shift: Had colonoscopy, NPO at midnight for left heart cath tomorrow.    Running:   PRN Med:      Vitals: Blood pressure 91/61, pulse 84, temperature 97.8  F (36.6  C), temperature source Oral, resp. rate 16, height 1.715 m (5' 7.5\"), weight 67 kg (147 lb 11.2 oz), SpO2 95 %.    A:   Neuro: Ax4 Denies Headache, dizziness,  Lightheadedness, numbness and tingling. calls appropriately   Cardiac: SR, BBB HR 80s  Afebrile, VSS.  Denies chest pain. Had period of low maps and Bps in afternoon, was asymptomatic, team was notified and patient was closely monitored, came back up within an hour.  Respiratory: sating >95 ON RA. denies SOB. LS clear bilaterally.   Diet/appetite: 2gNa diet. NPO at midnight  GI/:  Last BM this morning. Denies abdominal pain. Good urine output.   Activity:  Moves independently.  Pain: Denies  Skin: RPIV SL.  Plan: Continue to monitor and follow plan of care. Notify Cards 2 team of any change in patient status.  "

## 2022-05-23 NOTE — PROGRESS NOTES
[facility]    Cardiology Progress Note- Cards 2        Date of Admission:  5/16/2022     Assessment & Plan: HVSL    Dandy EDUARD Sands is a 61 year old male with history of CAD s/p remote PCI to LAD, ICM LVEF 30% s/p CRT-D, severe MR, APL with hx of DVT/PE on chronic anticoagulation with warfarin, hx of COVID-19 1/2022 (was hospitalized, not intubated), HTN, and HLD who was admitted to [facility] 5/16/2022 as a transfer from [facility] in [place] for evaluation of multivessel coronary artery disease and moderate-severe functional mitral regurgitation. Found to have dilated LV with LVEF 10-20% making him high-risk surgical candidate. Currently undergoing LVAD/Transplant work up.    Changes Today:   - Colonoscopy today  - Resume lasix/K  - Touch base with coordinator re LVAD/txpt teaching  - Plan for PCI tomorrow, NPO at [facility]  - Resume MMF    # Longstanding HFrEF Secondary to ICM (LVEF 10-20%) NYHA III; ACC/AHA Stage D; SCAI Stage C  # CAD s/p PCI to LAD (2005)  # Hx of MI (2007)  # Severe ostial LAD disease with in-stent restenosis of mid LAD at diag bifurcation, severe proximal RCA disease, mild circ disease  # S/p CRT-D (Medtronic 2006, gen change 2012)  # Severe Functional MR  # Dilated LV (LVIDd 6.1 cm)  Transferred from [facility] in [place] 5/16/2022 for evaluation for CABG + MVR at a center with ECMO/VAD as a back up. However at the OSH, his EF was reported 30-35%, and EF here is 10-20% (on TTE and CMRI) with severely dilated LV. CMRI showing infarcted myocardium in LAD territory. Given the degree of LV dysfunction/dilation, moderate to severe functional MR and lack of viable myocardium, cardiac surgery would be high risk. Will proceed with LVAD/Transplant work up. Hopefully will have a decision on treatment plan by sometime next week  - ACEI/ARB/ARNI- Captopril 12.5mg Q8H for afterload reduction  - BB- Hold pta metoprolol succinate 12.5mg at bedtime  in setting of cardiogenic shock  - Aldosterone receptor antagonist- Hold off  - SGLT2i- Hold off   - Afterload reduction- ACEi as above  - Antiplatelet- continue pta aspirin 81mg daily, will need to be loaded with PGY12 for stenting  - Statin- continue pta atorvastatin 40mg daily   - Volume status: Euvolemic. Plan to keep I=O   - Diuretic- PTA lasix 40mg BID + KCl 20 BID  - SCD- S/p CRT-D (Medtronic 2006, gen change 2012)  - OT consult  - Nutrition consult  - Strict I&Os   - Daily weights  - Telemetry  - RCA/LAD PCI planned for 5/24  - VAD SW: will reach out to try and coordinate edu     LVAD/Transplant Evaluation Checklist  [x] labs (CBC, CMP, PT/INR, cystatin C, prealbumin, UA + micro)  [x] Infectious (Hep A/B/C, HIV, treponema, HSV 1/2 IgG, CMV IgG, Toxo IgG, EBV IgG, varicella IgG, Quant gold, COVID vaccine/PCR)  [x] Utox/nicotine and cotinine/PeTH   [] Immunocompatibility (last transfusion, ABO, HLA tissue typing, PRA): in process  [x] ECG, Echo  [] CPX   [] 6MWT   - Ordered, to be completed Monday 5/23  [x] RHC  [x] CVTS consult  [x] Social work consult  []  Palliative care consult: order placed, consult pending  [] Neuropsych consult: order placed, consult pending  [x] Nutrition consult  [x] CT Dental   [x] Dental consult  [x] Abd US + doppler  - Abd US complete done, not with doppler; OK given unremarkable CT CAP  [x] Extremity US and ABIs  [x] Carotid US (if DM or ICM or >49yo)  [] PFTs:   [] Dexa: outpatient  [x] CT head non-contrast  [x] CT CAP non-contrast  [x] Colonoscopy (>49 yo)  [x] PSA/HCG     # Anxiety due to medical condition  # Insomnia  - PTA lorazepam 0.5-1 mg q6h PRN (pta q4h prn)  - PTA zolpidem 5mg at bedtime prn  - Hydroxyzine prn for anxiety  - Melatonin scheduled   - Psych consulted: cymbalta 30mg; continue anxiety PRNs while in the hospital    # Mild-Moderate Emphysema  # Hx of COVID-19  Had COVID ~1/2022, was hospitalized for ~6 days, was on BiPap for a little, was not intubated.  Currently no issues. Former smoker 2 ppd for 40 years quit on 09/09/09.      # Liver injury, mixed hepatocellular & cholestatic, acute  Noted 5/17. Likely in setting of hepatic congestion. Diuresis as above.   - Follow CMP q12h     # GERD  - Continue pta famotidine 20mg bid      # BPH  - Continue pta tamsulosin 0.4mg daily     # SLE  # APL  # Hx of DVT and PE  PTA on warfarin (he took 6.5mg daily). Transitioned to Lovenox at OSH in anticipation of surgery. He reports that his main symptom of lupus is diffuse joint pains that is tolerable with Tylenol.   - Rheumatology consulted, as above   - Lovenox 70mg q12h (hold for c-scope)  - Continue pta hydroxychloroquine 200mg bid   - PTA mycophenolic acid 1500mg q12h  -- In future, would plan to hold MMF one week prior to surgery and re-starting 5-7 days after surgery in the absence of surgical site infection, poor wound healing or infection elsewhere.  - Tylenol prn for pain     # SVT  # Nonsustained VT  SVT noted 5/21 with rates up to 165bpm self terminating after up to 40 secs, 3 episodes at 4pm, hemodynamically stable. ~8AM 5/22 noted to have 22bts nonsustained VT.  Asymptomatic.  - Telemetry  - Monitor lytes    # Anemia, chronic  - Continue pta ferrous sulfate 325mg daily     # High risk for SUHAIL  Pt had plan for outpatient sleep study, missed due to this hospitalization  - Will need to reschedule outpatient    # Severe malnutrition in the context of chronic illness  Supplements to help maintain nutrition.      Diet:  2g Na diet, 2L fluid restriction  DVT Prophylaxis: Enoxaparin (Lovenox) subcutaneous   Fitzgerald Catheter: Not present  Code Status:   FULL      Disposition Plan   Expected discharge: 4 - 7 days, recommended to prior living arrangement once advanced heart failure work-up completed.    Entered: Cindy Hall MD 05/23/2022, 7:14 AM       The patient's care was discussed with the Attending Physician, Dr. Mendez. .    Cindy Hall MD  [facility]  [facility]    ______________________________________________________________________    Interval History   NAEO. Completed golytely prep. Feeling well. Denies light-headedness, chest pain, shortness of breath, fevers/chills.     Data reviewed today: I reviewed all medications, new labs and imaging results over the last 24 hours. I personally reviewed no imaging or EKG's to review for today.     Physical Exam   Vital Signs: Temp: 98.8  F (37.1  C) Temp src: Oral BP: 96/66 Pulse: 83   Resp: 16 SpO2: 99 % O2 Device: None (Room air)    Weight: 147 lbs 11.2 oz  General Appearance: Sitting up in chair, NAD  Respiratory: Normal work of breathing, no wheezes/rales/rhonchi appreciated  Cardiovascular: RRR, s3 appreciated; JVP est 8cm; no BLE edema  GI: Abdomen soft, nontender; bowel sounds present  Skin: No rashes on visible skin  Other: Pleasant mood, full affect     Data   Recent Labs   Lab 05/23/22  0614 05/22/22  1743 05/22/22  0712 05/21/22  1833 05/21/22  0433 05/20/22  1715 05/20/22  0516   WBC 4.1  --  5.7  --   --   --  5.5   HGB 8.7*  --  9.8*  --   --   --  8.6*   MCV 91  --  94  --   --   --  92     --  274  --   --   --  268   INR 1.19*  --  1.13  --  1.17*  --  1.24*    134 135   < > 136   < > 138   POTASSIUM 3.4 4.0 4.4   < > 4.6   < > 4.2   CHLORIDE 106 101 105   < > 108   < > 105   CO2 23 25 19*   < > 20   < > 24   BUN 11 15 16   < > 15   < > 16   CR 0.95 1.01 0.94   < > 0.72   < > 0.92   ANIONGAP 9 8 11   < > 8   < > 9   ELDA 8.8 9.2 9.5   < > 9.2   < > 8.4*   GLC 85 147* 82   < > 82   < > 87   ALBUMIN 3.1* 3.8 3.4   < > 3.2*   < > 3.1*   PROTTOTAL 6.5* 8.0 7.3   < > 6.7*   < > 6.0*   BILITOTAL 0.6 0.6 0.5   < > 0.6   < > 0.5   ALKPHOS 67 82 72   < > 71   < > 71   ALT 63 88* 86*   < > 110*   < > 151*   AST 15 21 26   < > 21   < > 40    < > = values in this interval not displayed.     No results found for this or any previous visit (from the past 24  hour(s)).

## 2022-05-23 NOTE — ANESTHESIA PREPROCEDURE EVALUATION
Anesthesia Pre-Procedure Evaluation    Patient: Dandy Sands   MRN: [id number] : 1960        Procedure : Procedure(s):  COLONOSCOPY          No past medical history on file.   Past Surgical History:   Procedure Laterality Date     CV RIGHT HEART CATH MEASUREMENTS RECORDED N/A 2022    Procedure: Right Heart Catheterization;  Surgeon: Justo Stewart MD;  Location:  HE[facility] C[facility] CATH LAB     CV RIGHT HEART EXERCISE STRESS STUDY N/A 2022    Procedure: Stress Drug Study;  Surgeon: Justo Stewart MD;  Location:  HE[facility] CATH LAB      No Known Allergies   Social History     Tobacco Use     Smoking status: Not on file     Smokeless tobacco: Not on file   Substance Use Topics     Alcohol use: Not on file      Wt Readings from Last 1 Encounters:   22 66.2 kg (146 lb)        Anesthesia Evaluation   Pt has had prior anesthetic. Type: General and MAC.    No history of anesthetic complications       ROS/MED HX  ENT/Pulmonary: Comment: COVID 2022    (+) COPD (mild-moderate Emphysema ),     Neurologic:  - neg neurologic ROS     Cardiovascular: Comment: LVAD/Transplant evaluation    Severe ostial LAD disease with in-stent restenosis of mid LAD at diag bifurcation, severe proximal RCA disease, mild circ disease    Dilated LV (LVIDd 6.1 cm)    (+) --CAD -past MI () -stent ()-CHF ( ACC/AHA Stage D; SCAI Stage C) etiology: ICM Last EF: 10-15% NYHA classification: III. ICD ( (Medtronic , gen change ))  type;CRT-D dysrhythmias (SVT/ VT), valvular problems/murmurs type: MR severe. Previous cardiac testing   Echo: Date: 22 Results:  Interpretation Summary  Severe left ventricular dilation is present. Left ventricular function is  decreased. The ejection fraction is 10-15% (severely reduced). Severe diffuse  hypokinesis is present.  Mild right ventricular dilation is present. Global right ventricular function  is mildly reduced.  Severe functional mitral insufficiency is  present.  Pulmonary hypertension is present. Estimated pulmonary artery systolic  pressure is 44 mmHg plus right atrial pressure.  Dilation of the inferior vena cava is present with abnormal respiratory  variation in diameter. Mean RA pressure estiamted at 15 mmHg.  No pericardial effusion is present.  Stress Test: Date: Results:    ECG Reviewed: Date: 5/18/22 Results:  Sinus rhythm with occasional PACs   Non-specific intra-ventricular conduction block   Cath:  Date: 5/18/22 Results:   Right sided filling pressures are normal.   Moderately elevated pulmonary artery hypertension.   Left sided filling pressures are moderately elevated.   Reduced cardiac output level.     Ambulatory cardiogenic shock. Elevated wedge pressure with high V waves in the setting of known MR.   Good response to nipride with improvement in cardiac index yet patient is with borderline symptomatic hypotension at the time   Pulmonary hypertension: 5/17/22.   METS/Exercise Tolerance:     Hematologic: Comments: H/o APL  SLE    (+) History of blood clots, anemia,     Musculoskeletal:       GI/Hepatic: Comment: Acute Liver injury, mixed hepatocellular & cholestatic, likely 2/2 hepatic congestion    (+) GERD,     Renal/Genitourinary:     (+) BPH,     Endo:  - neg endo ROS     Psychiatric/Substance Use:     (+) psychiatric history anxiety (insomnia)     Infectious Disease:  - neg infectious disease ROS     Malignancy:  - neg malignancy ROS     Other:            Physical Exam    Airway        Mallampati: II   TM distance: > 3 FB   Neck ROM: full   Mouth opening: > 3 cm    Respiratory Devices and Support         Dental         B=Bridge, C=Chipped, L=Loose, M=Missing    Cardiovascular   cardiovascular exam normal          Pulmonary   pulmonary exam normal                OUTSIDE LABS:  CBC:   Lab Results   Component Value Date    WBC 5.7 05/22/2022    WBC 5.5 05/20/2022    HGB 9.8 (L) 05/22/2022    HGB 8.6 (L) 05/20/2022    HCT 32.7 (L) 05/22/2022    HCT  27.4 (L) 05/20/2022     05/22/2022     05/20/2022     BMP:   Lab Results   Component Value Date     05/22/2022     05/22/2022    POTASSIUM 4.0 05/22/2022    POTASSIUM 4.4 05/22/2022    CHLORIDE 101 05/22/2022    CHLORIDE 105 05/22/2022    CO2 25 05/22/2022    CO2 19 (L) 05/22/2022    BUN 15 05/22/2022    BUN 16 05/22/2022    CR 1.01 05/22/2022    CR 0.94 05/22/2022     (H) 05/22/2022    GLC 82 05/22/2022     COAGS:   Lab Results   Component Value Date    PTT 53 (H) 05/16/2022    INR 1.13 05/22/2022     POC: No results found for: BGM, HCG, HCGS  HEPATIC:   Lab Results   Component Value Date    ALBUMIN 3.8 05/22/2022    PROTTOTAL 8.0 05/22/2022    ALT 88 (H) 05/22/2022    AST 21 05/22/2022    ALKPHOS 82 05/22/2022    BILITOTAL 0.6 05/22/2022     OTHER:   Lab Results   Component Value Date    A1C 5.5 05/20/2022    ELDA 9.2 05/22/2022    PHOS 3.4 05/18/2022    MAG 2.3 05/22/2022    TSH 1.65 05/20/2022       Anesthesia Plan    ASA Status:  4   NPO Status:  NPO Appropriate    Anesthesia Type: MAC.     - Reason for MAC: chronic cardiopulmonary disease, straight local not clinically adequate   Induction: Intravenous, Propofol.   Maintenance: TIVA.   Techniques and Equipment:     - Lines/Monitors: 2nd IV     - Drips/Meds: Phenylephrine     Consents    Anesthesia Plan(s) and associated risks, benefits, and realistic alternatives discussed. Questions answered and patient/representative(s) expressed understanding.    - Discussed:     - Discussed with:  Patient      - Extended Intubation/Ventilatory Support Discussed: No.      - Patient is DNR/DNI Status: No    Use of blood products discussed: No .     Postoperative Care       PONV prophylaxis: Ondansetron (or other 5HT-3)     Comments:                Myranda Sanabria MD

## 2022-05-23 NOTE — PLAN OF CARE
D:  admitted to [facility] 5/16/2022 as a transfer from OSH for evaluation of multivessel CAD and moderate-severe functional mitral regurgitation. Found to have dilated LV with LVEF 10-20% making him high-risk surgical candidate. Currently undergoing LVAD/Transplant work up.  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 MD notified regarding soft BP. Ordered parameters for captopril-MN dose held.  Changed: NPO for colonoscopy   Running: PIV SL'd  PRN: Atarax, Ativan, Ambien     A: A0x4. VSS , on RA. Monitor SR/ST  w/1AVB and BBB w/0-5 PAC, occ PVC . Denies c/o pain. Difficulty sleeping despite Melatonin,  Atarax and Ambien. Ativan given-appeared to sleep after.  Good UO. Stool clear yellow after completing golytely       I/O this shift:  In: -   Out: 475 [Urine:475]    Temp:  [97.4  F (36.3  C)-98.6  F (37  C)] 98.6  F (37  C)  Pulse:  [] 83  Resp:  [16] 16  BP: (87-98)/(60-72) 88/60  SpO2:  [96 %-100 %] 96 %      P: Continue to monitor Pt status and report changes to treatment team. Plan for colonoscopy today.

## 2022-05-23 NOTE — ANESTHESIA CARE TRANSFER NOTE
Patient: Dandy Sands    Procedure: Procedure(s):  COLONOSCOPY       Diagnosis: Decompensated heart failure (H) [I50.9]  Special screening for malignant neoplasms, colon [Z12.11]  Diagnosis Additional Information: No value filed.    Anesthesia Type:   MAC     Note:    Oropharynx: spontaneously breathing  Level of Consciousness: awake  Oxygen Supplementation: room air    Independent Airway: airway patency satisfactory and stable  Dentition: dentition unchanged  Vital Signs Stable: post-procedure vital signs reviewed and stable  Report to RN Given: handoff report given  Patient transferred to: PACU (driveby in pacu)  Comments: Report called to 6C.   Handoff Report: Identifed the Patient, Identified the Reponsible Provider, Reviewed the pertinent medical history, Discussed the surgical course, Reviewed Intra-OP anesthesia mangement and issues during anesthesia, Set expectations for post-procedure period and Allowed opportunity for questions and acknowledgement of understanding      Vitals:  Vitals Value Taken Time   BP 95/65    Temp     Pulse 87    Resp     SpO2 98        Electronically Signed By: YAO Miner CRNA  May 23, 2022  8:57 AM

## 2022-05-23 NOTE — PROGRESS NOTES
BRIEF PROGRESS NOTE    Called giovanni Barry to update her. She would prefer to be involved in any LVAD/transplant education tomorrow and can be reached via phone. Info below:    - Daughter Asha Sands ([phone])- surrogate decision maker, Living Will Durable POA for Healthcare  - Son Amos Sands ([phone]), who will be visiting the hospital tomorrow

## 2022-05-24 LAB
ACT BLD: 156 SECONDS (ref 74–150)
ACT BLD: 194 SECONDS (ref 74–150)
ACT BLD: 250 SECONDS (ref 74–150)
ACT BLD: 262 SECONDS (ref 74–150)
ACT BLD: 288 SECONDS (ref 74–150)
ACT BLD: 309 SECONDS (ref 74–150)
ACT BLD: 313 SECONDS (ref 74–150)
ALBUMIN SERPL-MCNC: 3.2 G/DL (ref 3.4–5)
ALBUMIN SERPL-MCNC: 3.5 G/DL (ref 3.4–5)
ALP SERPL-CCNC: 74 U/L (ref 40–150)
ALP SERPL-CCNC: 83 U/L (ref 40–150)
ALT SERPL W P-5'-P-CCNC: 51 U/L (ref 0–70)
ALT SERPL W P-5'-P-CCNC: 56 U/L (ref 0–70)
ANION GAP SERPL CALCULATED.3IONS-SCNC: 11 MMOL/L (ref 3–14)
ANION GAP SERPL CALCULATED.3IONS-SCNC: 8 MMOL/L (ref 3–14)
AST SERPL W P-5'-P-CCNC: 16 U/L (ref 0–45)
AST SERPL W P-5'-P-CCNC: 31 U/L (ref 0–45)
BASOPHILS # BLD AUTO: 0 10E3/UL (ref 0–0.2)
BASOPHILS NFR BLD AUTO: 1 %
BILIRUB SERPL-MCNC: 0.7 MG/DL (ref 0.2–1.3)
BILIRUB SERPL-MCNC: 1.8 MG/DL (ref 0.2–1.3)
BUN SERPL-MCNC: 11 MG/DL (ref 7–30)
BUN SERPL-MCNC: 13 MG/DL (ref 7–30)
CALCIUM SERPL-MCNC: 8.6 MG/DL (ref 8.5–10.1)
CALCIUM SERPL-MCNC: 8.9 MG/DL (ref 8.5–10.1)
CHLORIDE BLD-SCNC: 103 MMOL/L (ref 94–109)
CHLORIDE BLD-SCNC: 107 MMOL/L (ref 94–109)
CO2 SERPL-SCNC: 21 MMOL/L (ref 20–32)
CO2 SERPL-SCNC: 24 MMOL/L (ref 20–32)
CREAT SERPL-MCNC: 0.89 MG/DL (ref 0.66–1.25)
CREAT SERPL-MCNC: 0.91 MG/DL (ref 0.66–1.25)
EOSINOPHIL # BLD AUTO: 0.1 10E3/UL (ref 0–0.7)
EOSINOPHIL NFR BLD AUTO: 1 %
ERYTHROCYTE [DISTWIDTH] IN BLOOD BY AUTOMATED COUNT: 18.3 % (ref 10–15)
GFR SERPL CREATININE-BSD FRML MDRD: >90 ML/MIN/1.73M2
GFR SERPL CREATININE-BSD FRML MDRD: >90 ML/MIN/1.73M2
GLUCOSE BLD-MCNC: 83 MG/DL (ref 70–99)
GLUCOSE BLD-MCNC: 87 MG/DL (ref 70–99)
HCT VFR BLD AUTO: 28.6 % (ref 40–53)
HGB BLD-MCNC: 8.8 G/DL (ref 13.3–17.7)
HGB BLD-MCNC: 8.9 G/DL (ref 13.3–17.7)
HGB BLD-MCNC: 9.2 G/DL (ref 13.3–17.7)
IMM GRANULOCYTES # BLD: 0 10E3/UL
IMM GRANULOCYTES NFR BLD: 1 %
INR PPP: 1.23 (ref 0.85–1.15)
LYMPHOCYTES # BLD AUTO: 0.6 10E3/UL (ref 0.8–5.3)
LYMPHOCYTES NFR BLD AUTO: 15 %
MAGNESIUM SERPL-MCNC: 2.2 MG/DL (ref 1.6–2.3)
MCH RBC QN AUTO: 28.8 PG (ref 26.5–33)
MCHC RBC AUTO-ENTMCNC: 30.8 G/DL (ref 31.5–36.5)
MCV RBC AUTO: 94 FL (ref 78–100)
MONOCYTES # BLD AUTO: 0.6 10E3/UL (ref 0–1.3)
MONOCYTES NFR BLD AUTO: 14 %
NEUTROPHILS # BLD AUTO: 2.8 10E3/UL (ref 1.6–8.3)
NEUTROPHILS NFR BLD AUTO: 68 %
NRBC # BLD AUTO: 0 10E3/UL
NRBC BLD AUTO-RTO: 0 /100
OXYHGB MFR BLDV: 39 % (ref 92–100)
OXYHGB MFR BLDV: 87 % (ref 92–100)
PLATELET # BLD AUTO: 252 10E3/UL (ref 150–450)
POTASSIUM BLD-SCNC: 4.2 MMOL/L (ref 3.4–5.3)
POTASSIUM BLD-SCNC: 4.3 MMOL/L (ref 3.4–5.3)
PROT SERPL-MCNC: 6.7 G/DL (ref 6.8–8.8)
PROT SERPL-MCNC: 7.4 G/DL (ref 6.8–8.8)
RBC # BLD AUTO: 3.06 10E6/UL (ref 4.4–5.9)
SARS-COV-2 RNA RESP QL NAA+PROBE: NEGATIVE
SODIUM SERPL-SCNC: 135 MMOL/L (ref 133–144)
SODIUM SERPL-SCNC: 139 MMOL/L (ref 133–144)
WBC # BLD AUTO: 4.1 10E3/UL (ref 4–11)

## 2022-05-24 PROCEDURE — 250N000013 HC RX MED GY IP 250 OP 250 PS 637

## 2022-05-24 PROCEDURE — 93460 R&L HRT ART/VENTRICLE ANGIO: CPT | Mod: 26 | Performed by: INTERNAL MEDICINE

## 2022-05-24 PROCEDURE — 99152 MOD SED SAME PHYS/QHP 5/>YRS: CPT | Mod: GC | Performed by: INTERNAL MEDICINE

## 2022-05-24 PROCEDURE — 250N000013 HC RX MED GY IP 250 OP 250 PS 637: Performed by: STUDENT IN AN ORGANIZED HEALTH CARE EDUCATION/TRAINING PROGRAM

## 2022-05-24 PROCEDURE — 92978 ENDOLUMINL IVUS OCT C 1ST: CPT | Mod: 26 | Performed by: INTERNAL MEDICINE

## 2022-05-24 PROCEDURE — 93005 ELECTROCARDIOGRAM TRACING: CPT

## 2022-05-24 PROCEDURE — 250N000009 HC RX 250: Performed by: INTERNAL MEDICINE

## 2022-05-24 PROCEDURE — 250N000012 HC RX MED GY IP 250 OP 636 PS 637: Performed by: STUDENT IN AN ORGANIZED HEALTH CARE EDUCATION/TRAINING PROGRAM

## 2022-05-24 PROCEDURE — 93453 R&L HRT CATH W/VENTRICLGRPHY: CPT | Performed by: INTERNAL MEDICINE

## 2022-05-24 PROCEDURE — C9602 PERC D-E COR STENT ATHER S: HCPCS | Performed by: INTERNAL MEDICINE

## 2022-05-24 PROCEDURE — 92979 ENDOLUMINL IVUS OCT C EA: CPT | Mod: 26 | Performed by: INTERNAL MEDICINE

## 2022-05-24 PROCEDURE — 93460 R&L HRT ART/VENTRICLE ANGIO: CPT | Performed by: INTERNAL MEDICINE

## 2022-05-24 PROCEDURE — 02F13ZZ FRAGMENTATION IN CORONARY ARTERY, TWO ARTERIES, PERCUTANEOUS APPROACH: ICD-10-PCS | Performed by: INTERNAL MEDICINE

## 2022-05-24 PROCEDURE — C1887 CATHETER, GUIDING: HCPCS | Performed by: INTERNAL MEDICINE

## 2022-05-24 PROCEDURE — C1725 CATH, TRANSLUMIN NON-LASER: HCPCS | Performed by: INTERNAL MEDICINE

## 2022-05-24 PROCEDURE — 99152 MOD SED SAME PHYS/QHP 5/>YRS: CPT | Performed by: INTERNAL MEDICINE

## 2022-05-24 PROCEDURE — 82810 BLOOD GASES O2 SAT ONLY: CPT

## 2022-05-24 PROCEDURE — 84155 ASSAY OF PROTEIN SERUM: CPT | Performed by: STUDENT IN AN ORGANIZED HEALTH CARE EDUCATION/TRAINING PROGRAM

## 2022-05-24 PROCEDURE — 85025 COMPLETE CBC W/AUTO DIFF WBC: CPT

## 2022-05-24 PROCEDURE — 85018 HEMOGLOBIN: CPT

## 2022-05-24 PROCEDURE — 250N000011 HC RX IP 250 OP 636: Performed by: INTERNAL MEDICINE

## 2022-05-24 PROCEDURE — C1894 INTRO/SHEATH, NON-LASER: HCPCS | Performed by: INTERNAL MEDICINE

## 2022-05-24 PROCEDURE — B241ZZ3 ULTRASONOGRAPHY OF MULTIPLE CORONARY ARTERIES, INTRAVASCULAR: ICD-10-PCS | Performed by: INTERNAL MEDICINE

## 2022-05-24 PROCEDURE — B2111ZZ FLUOROSCOPY OF MULTIPLE CORONARY ARTERIES USING LOW OSMOLAR CONTRAST: ICD-10-PCS | Performed by: INTERNAL MEDICINE

## 2022-05-24 PROCEDURE — 83735 ASSAY OF MAGNESIUM: CPT

## 2022-05-24 PROCEDURE — 99153 MOD SED SAME PHYS/QHP EA: CPT | Performed by: INTERNAL MEDICINE

## 2022-05-24 PROCEDURE — 250N000013 HC RX MED GY IP 250 OP 250 PS 637: Performed by: INTERNAL MEDICINE

## 2022-05-24 PROCEDURE — 92978 ENDOLUMINL IVUS OCT C 1ST: CPT | Performed by: INTERNAL MEDICINE

## 2022-05-24 PROCEDURE — C1761 HC OR CATH, TRANS INTRA LITHO/CORONARY: HCPCS | Performed by: INTERNAL MEDICINE

## 2022-05-24 PROCEDURE — 214N000001 HC R&B CCU UMMC

## 2022-05-24 PROCEDURE — 92979 ENDOLUMINL IVUS OCT C EA: CPT | Performed by: INTERNAL MEDICINE

## 2022-05-24 PROCEDURE — 36415 COLL VENOUS BLD VENIPUNCTURE: CPT | Performed by: STUDENT IN AN ORGANIZED HEALTH CARE EDUCATION/TRAINING PROGRAM

## 2022-05-24 PROCEDURE — C1760 CLOSURE DEV, VASC: HCPCS | Performed by: INTERNAL MEDICINE

## 2022-05-24 PROCEDURE — 272N000001 HC OR GENERAL SUPPLY STERILE: Performed by: INTERNAL MEDICINE

## 2022-05-24 PROCEDURE — 4A023N8 MEASUREMENT OF CARDIAC SAMPLING AND PRESSURE, BILATERAL, PERCUTANEOUS APPROACH: ICD-10-PCS | Performed by: INTERNAL MEDICINE

## 2022-05-24 PROCEDURE — 250N000013 HC RX MED GY IP 250 OP 250 PS 637: Performed by: PHYSICIAN ASSISTANT

## 2022-05-24 PROCEDURE — 36415 COLL VENOUS BLD VENIPUNCTURE: CPT

## 2022-05-24 PROCEDURE — C1769 GUIDE WIRE: HCPCS | Performed by: INTERNAL MEDICINE

## 2022-05-24 PROCEDURE — C1874 STENT, COATED/COV W/DEL SYS: HCPCS | Performed by: INTERNAL MEDICINE

## 2022-05-24 PROCEDURE — 80053 COMPREHEN METABOLIC PANEL: CPT | Performed by: STUDENT IN AN ORGANIZED HEALTH CARE EDUCATION/TRAINING PROGRAM

## 2022-05-24 PROCEDURE — 85347 COAGULATION TIME ACTIVATED: CPT

## 2022-05-24 PROCEDURE — 027135Z DILATION OF CORONARY ARTERY, TWO ARTERIES WITH TWO DRUG-ELUTING INTRALUMINAL DEVICES, PERCUTANEOUS APPROACH: ICD-10-PCS | Performed by: INTERNAL MEDICINE

## 2022-05-24 PROCEDURE — 85610 PROTHROMBIN TIME: CPT

## 2022-05-24 PROCEDURE — 85347 COAGULATION TIME ACTIVATED: CPT | Performed by: INTERNAL MEDICINE

## 2022-05-24 PROCEDURE — C9600 PERC DRUG-EL COR STENT SING: HCPCS | Mod: RC | Performed by: INTERNAL MEDICINE

## 2022-05-24 PROCEDURE — C1753 CATH, INTRAVAS ULTRASOUND: HCPCS | Performed by: INTERNAL MEDICINE

## 2022-05-24 PROCEDURE — 92928 PRQ TCAT PLMT NTRAC ST 1 LES: CPT | Mod: 76 | Performed by: INTERNAL MEDICINE

## 2022-05-24 PROCEDURE — U0005 INFEC AGEN DETEC AMPLI PROBE: HCPCS | Performed by: STUDENT IN AN ORGANIZED HEALTH CARE EDUCATION/TRAINING PROGRAM

## 2022-05-24 PROCEDURE — 93010 ELECTROCARDIOGRAM REPORT: CPT | Mod: 76 | Performed by: INTERNAL MEDICINE

## 2022-05-24 DEVICE — CLOSURE ANGIOSEAL 6FR 610130: Type: IMPLANTABLE DEVICE | Status: FUNCTIONAL

## 2022-05-24 DEVICE — STENT CORONARY DES SYNERGY XD MR US 3.00X38MM H7493941838300: Type: IMPLANTABLE DEVICE | Status: FUNCTIONAL

## 2022-05-24 DEVICE — STENT CORONARY DES SYNERGY XD MR US 3.00X32MM H7493941832300: Type: IMPLANTABLE DEVICE | Status: FUNCTIONAL

## 2022-05-24 RX ORDER — FENTANYL CITRATE 50 UG/ML
25 INJECTION, SOLUTION INTRAMUSCULAR; INTRAVENOUS
Status: DISCONTINUED | OUTPATIENT
Start: 2022-05-24 | End: 2022-05-24

## 2022-05-24 RX ORDER — ASPIRIN 325 MG
TABLET ORAL
Status: DISCONTINUED | OUTPATIENT
Start: 2022-05-24 | End: 2022-05-24 | Stop reason: HOSPADM

## 2022-05-24 RX ORDER — METOPROLOL TARTRATE 1 MG/ML
5 INJECTION, SOLUTION INTRAVENOUS
Status: DISCONTINUED | OUTPATIENT
Start: 2022-05-24 | End: 2022-05-25

## 2022-05-24 RX ORDER — NITROGLYCERIN 0.4 MG/1
0.4 TABLET SUBLINGUAL EVERY 5 MIN PRN
Status: DISCONTINUED | OUTPATIENT
Start: 2022-05-24 | End: 2022-05-24

## 2022-05-24 RX ORDER — OXYCODONE HYDROCHLORIDE 10 MG/1
10 TABLET ORAL EVERY 4 HOURS PRN
Status: DISCONTINUED | OUTPATIENT
Start: 2022-05-24 | End: 2022-05-25

## 2022-05-24 RX ORDER — FENTANYL CITRATE 50 UG/ML
25 INJECTION, SOLUTION INTRAMUSCULAR; INTRAVENOUS
Status: DISCONTINUED | OUTPATIENT
Start: 2022-05-24 | End: 2022-05-25

## 2022-05-24 RX ORDER — HYDRALAZINE HYDROCHLORIDE 20 MG/ML
10 INJECTION INTRAMUSCULAR; INTRAVENOUS EVERY 4 HOURS PRN
Status: DISCONTINUED | OUTPATIENT
Start: 2022-05-24 | End: 2022-05-24

## 2022-05-24 RX ORDER — WARFARIN SODIUM 5 MG/1
5 TABLET ORAL
Status: DISCONTINUED | OUTPATIENT
Start: 2022-05-24 | End: 2022-05-24

## 2022-05-24 RX ORDER — HYDRALAZINE HYDROCHLORIDE 20 MG/ML
10 INJECTION INTRAMUSCULAR; INTRAVENOUS EVERY 4 HOURS PRN
Status: DISCONTINUED | OUTPATIENT
Start: 2022-05-24 | End: 2022-05-25

## 2022-05-24 RX ORDER — ASPIRIN 81 MG/1
81 TABLET ORAL DAILY
Status: DISCONTINUED | OUTPATIENT
Start: 2022-05-25 | End: 2022-05-24

## 2022-05-24 RX ORDER — NALOXONE HYDROCHLORIDE 0.4 MG/ML
0.4 INJECTION, SOLUTION INTRAMUSCULAR; INTRAVENOUS; SUBCUTANEOUS
Status: DISCONTINUED | OUTPATIENT
Start: 2022-05-24 | End: 2022-05-24

## 2022-05-24 RX ORDER — OXYCODONE HYDROCHLORIDE 5 MG/1
5 TABLET ORAL EVERY 4 HOURS PRN
Status: DISCONTINUED | OUTPATIENT
Start: 2022-05-24 | End: 2022-05-25

## 2022-05-24 RX ORDER — FENTANYL CITRATE 50 UG/ML
INJECTION, SOLUTION INTRAMUSCULAR; INTRAVENOUS
Status: DISCONTINUED | OUTPATIENT
Start: 2022-05-24 | End: 2022-05-24 | Stop reason: HOSPADM

## 2022-05-24 RX ORDER — SODIUM CHLORIDE 9 MG/ML
INJECTION, SOLUTION INTRAVENOUS CONTINUOUS
Status: DISCONTINUED | OUTPATIENT
Start: 2022-05-24 | End: 2022-05-24

## 2022-05-24 RX ORDER — ONDANSETRON 4 MG/1
4 TABLET, ORALLY DISINTEGRATING ORAL EVERY 6 HOURS PRN
Status: DISCONTINUED | OUTPATIENT
Start: 2022-05-24 | End: 2022-05-25

## 2022-05-24 RX ORDER — NALOXONE HYDROCHLORIDE 0.4 MG/ML
0.2 INJECTION, SOLUTION INTRAMUSCULAR; INTRAVENOUS; SUBCUTANEOUS
Status: DISCONTINUED | OUTPATIENT
Start: 2022-05-24 | End: 2022-05-24

## 2022-05-24 RX ORDER — METOPROLOL TARTRATE 1 MG/ML
5 INJECTION, SOLUTION INTRAVENOUS
Status: DISCONTINUED | OUTPATIENT
Start: 2022-05-24 | End: 2022-05-24

## 2022-05-24 RX ORDER — NOREPINEPHRINE BITARTRATE 0.06 MG/ML
INJECTION, SOLUTION INTRAVENOUS CONTINUOUS PRN
Status: COMPLETED | OUTPATIENT
Start: 2022-05-24 | End: 2022-05-24

## 2022-05-24 RX ORDER — HEPARIN SODIUM 1000 [USP'U]/ML
INJECTION, SOLUTION INTRAVENOUS; SUBCUTANEOUS
Status: DISCONTINUED | OUTPATIENT
Start: 2022-05-24 | End: 2022-05-24 | Stop reason: HOSPADM

## 2022-05-24 RX ORDER — ASPIRIN 81 MG/1
81 TABLET, CHEWABLE ORAL ONCE
Status: DISCONTINUED | OUTPATIENT
Start: 2022-05-24 | End: 2022-05-26 | Stop reason: HOSPADM

## 2022-05-24 RX ORDER — NITROGLYCERIN 0.4 MG/1
0.4 TABLET SUBLINGUAL EVERY 5 MIN PRN
Status: DISCONTINUED | OUTPATIENT
Start: 2022-05-24 | End: 2022-05-26 | Stop reason: HOSPADM

## 2022-05-24 RX ORDER — FLUMAZENIL 0.1 MG/ML
0.2 INJECTION, SOLUTION INTRAVENOUS
Status: DISCONTINUED | OUTPATIENT
Start: 2022-05-24 | End: 2022-05-24

## 2022-05-24 RX ORDER — SODIUM CHLORIDE 9 MG/ML
INJECTION, SOLUTION INTRAVENOUS CONTINUOUS
Status: ACTIVE | OUTPATIENT
Start: 2022-05-24 | End: 2022-05-24

## 2022-05-24 RX ORDER — ONDANSETRON 4 MG/1
4 TABLET, ORALLY DISINTEGRATING ORAL EVERY 6 HOURS PRN
Status: DISCONTINUED | OUTPATIENT
Start: 2022-05-24 | End: 2022-05-24

## 2022-05-24 RX ORDER — IOPAMIDOL 755 MG/ML
INJECTION, SOLUTION INTRAVASCULAR
Status: DISCONTINUED | OUTPATIENT
Start: 2022-05-24 | End: 2022-05-24 | Stop reason: HOSPADM

## 2022-05-24 RX ORDER — FENTANYL CITRATE-0.9 % NACL/PF 10 MCG/ML
PLASTIC BAG, INJECTION (ML) INTRAVENOUS
Status: DISCONTINUED | OUTPATIENT
Start: 2022-05-24 | End: 2022-05-24 | Stop reason: HOSPADM

## 2022-05-24 RX ORDER — ATROPINE SULFATE 0.1 MG/ML
0.5 INJECTION INTRAVENOUS
Status: DISCONTINUED | OUTPATIENT
Start: 2022-05-24 | End: 2022-05-24

## 2022-05-24 RX ORDER — ONDANSETRON 2 MG/ML
4 INJECTION INTRAMUSCULAR; INTRAVENOUS EVERY 6 HOURS PRN
Status: DISCONTINUED | OUTPATIENT
Start: 2022-05-24 | End: 2022-05-25

## 2022-05-24 RX ORDER — WARFARIN SODIUM 5 MG/1
5 TABLET ORAL ONCE
Status: COMPLETED | OUTPATIENT
Start: 2022-05-24 | End: 2022-05-24

## 2022-05-24 RX ORDER — FLUMAZENIL 0.1 MG/ML
0.2 INJECTION, SOLUTION INTRAVENOUS
Status: DISCONTINUED | OUTPATIENT
Start: 2022-05-24 | End: 2022-05-25

## 2022-05-24 RX ORDER — ATROPINE SULFATE 0.1 MG/ML
0.5 INJECTION INTRAVENOUS
Status: DISCONTINUED | OUTPATIENT
Start: 2022-05-24 | End: 2022-05-25

## 2022-05-24 RX ORDER — ONDANSETRON 2 MG/ML
4 INJECTION INTRAMUSCULAR; INTRAVENOUS EVERY 6 HOURS PRN
Status: DISCONTINUED | OUTPATIENT
Start: 2022-05-24 | End: 2022-05-24

## 2022-05-24 RX ORDER — FUROSEMIDE 10 MG/ML
INJECTION INTRAMUSCULAR; INTRAVENOUS
Status: DISCONTINUED | OUTPATIENT
Start: 2022-05-24 | End: 2022-05-24 | Stop reason: HOSPADM

## 2022-05-24 RX ADMIN — MYCOPHENOLATE MOFETIL 1500 MG: 500 TABLET, FILM COATED ORAL at 20:11

## 2022-05-24 RX ADMIN — ZOLPIDEM TARTRATE 5 MG: 5 TABLET, FILM COATED ORAL at 23:03

## 2022-05-24 RX ADMIN — TAMSULOSIN HYDROCHLORIDE 0.4 MG: 0.4 CAPSULE ORAL at 18:15

## 2022-05-24 RX ADMIN — Medication 1 TABLET: at 18:15

## 2022-05-24 RX ADMIN — Medication 5 MG: at 20:13

## 2022-05-24 RX ADMIN — THIAMINE HCL TAB 100 MG 100 MG: 100 TAB at 18:16

## 2022-05-24 RX ADMIN — CAPTOPRIL 12.5 MG: 12.5 TABLET ORAL at 20:12

## 2022-05-24 RX ADMIN — CAPTOPRIL 12.5 MG: 12.5 TABLET ORAL at 07:42

## 2022-05-24 RX ADMIN — WARFARIN SODIUM 5 MG: 5 TABLET ORAL at 20:11

## 2022-05-24 RX ADMIN — TICAGRELOR 90 MG: 90 TABLET ORAL at 20:13

## 2022-05-24 RX ADMIN — LORAZEPAM 1 MG: 0.5 TABLET ORAL at 23:03

## 2022-05-24 RX ADMIN — HYDROXYZINE HYDROCHLORIDE 50 MG: 50 TABLET ORAL at 20:17

## 2022-05-24 RX ADMIN — ASPIRIN 81 MG: 81 TABLET, COATED ORAL at 07:41

## 2022-05-24 RX ADMIN — DULOXETINE 30 MG: 30 CAPSULE, DELAYED RELEASE ORAL at 23:03

## 2022-05-24 RX ADMIN — HYDROXYCHLOROQUINE SULFATE 200 MG: 200 TABLET, FILM COATED ORAL at 07:43

## 2022-05-24 RX ADMIN — FAMOTIDINE 20 MG: 20 TABLET ORAL at 20:11

## 2022-05-24 RX ADMIN — MYCOPHENOLATE MOFETIL 1500 MG: 500 TABLET, FILM COATED ORAL at 07:41

## 2022-05-24 RX ADMIN — HYDROXYCHLOROQUINE SULFATE 200 MG: 200 TABLET, FILM COATED ORAL at 20:11

## 2022-05-24 RX ADMIN — POTASSIUM CHLORIDE 20 MEQ: 750 TABLET, EXTENDED RELEASE ORAL at 20:11

## 2022-05-24 RX ADMIN — ATORVASTATIN CALCIUM 40 MG: 40 TABLET, FILM COATED ORAL at 18:16

## 2022-05-24 ASSESSMENT — ACTIVITIES OF DAILY LIVING (ADL)
ADLS_ACUITY_SCORE: 23
ADLS_ACUITY_SCORE: 19
ADLS_ACUITY_SCORE: 23
ADLS_ACUITY_SCORE: 19
ADLS_ACUITY_SCORE: 23
ADLS_ACUITY_SCORE: 19

## 2022-05-24 NOTE — PROGRESS NOTES
[facility]   Cardiology II Service / Advanced Heart Failure  Daily Progress Note      Patient: Dandy Sands  MRN: [id number]  Admission Date: 5/16/2022  Hospital Day # 8    Assessment and Plan: Dandy Sands is a 61 year old male with history of CAD s/p remote PCI to LAD, ICM LVEF 30% s/p CRT-D, severe MR, APL with hx of DVT/PE on chronic anticoagulation with warfarin, hx of COVID-19 1/2022 (was hospitalized, not intubated), HTN, and HLD who was admitted to [facility] 5/16/2022 as a transfer from [facility] in [place] for evaluation of multivessel coronary artery disease and moderate-severe functional mitral regurgitation. Found to have dilated LV with LVEF 10-20% making him high-risk surgical candidate. Currently undergoing LVAD/Transplant work up.    Today's Plan:  - Coronary angiogram with planned PCI  - Hold lovenox prior to procedure, then will need to resume  - Start coumadin tonight  - Will stop captopril after tomorrow AM and start lisinopril 2.5 mg BID    # Acute on chronic systolic heart failure/HFrEF secondary to ICM (LVEF 10-20%)    Stage C. NYHA Class III.   # Severe Functional MR  # Dilated LV (LVIDd 6.1 cm)  Transferred from [facility] in [place] 5/16/2022 for evaluation for CABG + MVR at a center with ECMO/VAD as a back up. However at the OSH, his EF was reported 30-35%, and EF here is 10-20% (on TTE and CMRI) with severely dilated LV. CMRI showing infarcted myocardium in LAD territory. Given the degree of LV dysfunction/dilation, moderate to severe functional MR and lack of viable myocardium, cardiac surgery would be high risk. Have opted instread for RCA and LAD stenting and medical management, while also evaluating for advanced therapies in the event that he is not thriving on the above plan.    Fluid status: near euvolemic, continue lasix 40 mg PO BID  ACEi/ARB/ARNI/afterload reduction: Will stop captopril after tomorrow AM and start lisinopril 2.5 mg BID  BB:  Holding in the setting of cardiogenic shock  Aldosterone antagonist: deferred while other medical therapy is prioritized  SGLT2i: deferred while other medical therapy is prioritized  SCD prophylaxis: CRT-D    LVAD/Transplant Evaluation Checklist  [x]??? labs (CBC, CMP, PT/INR, cystatin C, prealbumin, UA + micro)  [x]??? Infectious (Hep A/B/C, HIV, treponema, HSV 1/2 IgG, CMV IgG, Toxo IgG, EBV IgG, varicella IgG, Quant gold, COVID vaccine/PCR)  [x]??? Utox/nicotine and cotinine/PeTH   []??? Immunocompatibility (last transfusion, ABO, HLA tissue typing, PRA): in process  [x]??? ECG, Echo  []??? CPX   []??? 6MWT   - Ordered, to be completed Monday 5/23  [x]??? RHC  [x]??? CVTS consult  [x]??? Social work consult  []???  Palliative care consult: order placed, consult pending  []??? Neuropsych consult: order placed, consult pending  [x]??? Nutrition consult  [x]??? CT Dental   [x]??? Dental consult  [x]??? Abd US + doppler  - Abd US complete done, not with doppler; OK given unremarkable CT CAP  [x]??? Extremity US and ABIs  [x]??? Carotid US (if DM or ICM or >51yo)  []??? PFTs:   []??? Dexa: outpatient  [x]??? CT head non-contrast  [x]??? CT CAP non-contrast  [x]??? Colonoscopy (>49 yo)  [x]??? PSA/HCG     # CAD s/p PCI to LAD (2005)  # Hx of MI (2007)  # Severe ostial LAD disease with in-stent restenosis of mid LAD at diag bifurcation, severe proximal RCA disease, mild circ disease  # S/p CRT-D (Medtronic 2006, gen change 2012)  - Plan for PCI of the RCA and ostial LAD today  - Antiplatelet plan is pending  - NPO for procedure    # SLE  # APL  # Hx of DVT and PE  PTA on warfarin (he took 6.5mg daily). Transitioned to Lovenox at OSH in anticipation of surgery. He reports that his main symptom of lupus is diffuse joint pains that is tolerable with Tylenol.   - Rheumatology consulted, as above   - Lovenox 70mg q12h (hold for cath then resume after)  - Continue pta hydroxychloroquine 200mg bid   - PTA mycophenolic acid  1500mg q12h  -- In future, would plan to hold MMF one week prior to surgery and re-starting 5-7 days after surgery in the absence of surgical site infection, poor wound healing or infection elsewhere.  - Tylenol prn for pain     # SVT  # Nonsustained VT  SVT noted 5/21 with rates up to 165bpm self terminating after up to 40 secs, 3 episodes at 4pm, hemodynamically stable. Has continued to have some NSVT since.  Asymptomatic.  - Telemetry  - Monitor lytes     # Anemia, chronic  - Continue pta ferrous sulfate 325mg daily      # High risk for SUHAIL  Pt had plan for outpatient sleep study, missed due to this hospitalization  - Will need to reschedule outpatient     # Severe malnutrition in the context of chronic illness  Supplements to help maintain nutrition.     # Anxiety due to medical condition  # Insomnia  - PTA lorazepam 0.5-1 mg q6h PRN (pta q4h prn)  - PTA zolpidem 5mg at bedtime prn  - Hydroxyzine prn for anxiety  - Melatonin scheduled   - Psych consulted: cymbalta 30mg; continue anxiety PRNs while in the hospital     # Mild-Moderate Emphysema  # Hx of COVID-19  Had COVID ~1/2022, was hospitalized for ~6 days, was on BiPap for a little, was not intubated. Currently no issues. Former smoker 2 ppd for 40 years quit on 09/09/09.      # Liver injury, mixed hepatocellular & cholestatic, acute  Noted 5/17. Likely in setting of hepatic congestion. Resolved.  - Diuresis as above     # GERD  - Continue pta famotidine 20mg bid      # BPH  - Continue pta tamsulosin 0.4mg daily       Diet:  2g Na diet, 2L fluid restriction  DVT Prophylaxis: Enoxaparin (Lovenox) subcutaneous   Fitzgerald Catheter: Not present  Code Status:   FULL        Laila España PA-C  Advanced Heart Failure/Cardiology II Service  Pager [phone] [facility] [phone]    ================================================================    Subjective/24-Hr Events:   Last 24 hr care team notes reviewed. Feeling well today. Has been out walking in the hallways. No  "chest pain. No fevers or chills. No abd pain. No nausea. Tolerating PO intake. No lightheadedness. No dizziness. No swellin in his legs or stomach.     ROS:  4 point ROS including respiratory, CV, GI and  (other than that noted in the HPI) is negative.     Medications: Reviewed in EPIC.     Physical Exam:   BP 92/62 (BP Location: Right arm)   Pulse 86   Temp 98.3  F (36.8  C) (Oral)   Resp 21   Ht 1.715 m (5' 7.5\")   Wt 67.2 kg (148 lb 3.2 oz)   SpO2 96%   BMI 22.87 kg/m      GENERAL: Appears comfortable, in no distress.  HEENT: Eye symmetrical, no discharge or icterus bilaterally. Mucous membranes moist and without lesions.  NECK: Supple, JVD ~8.   CV: RRR, +S1S2, systolic murmur, rub, or gallop.   RESPIRATORY: Respirations regular, even, and unlabored. Lungs CTA throughout.    GI: Soft and non distended with normoactive bowel sounds present in all quadrants. No tenderness, rebound, guarding.   EXTREMITIES: No peripheral edema. All extremities are warm and well perfused.  NEUROLOGIC: Alert and interacting approprietly. . No focal deficits.   MUSCULOSKELETAL: No joint swelling or tenderness.   SKIN: No jaundice. No rashes or lesions.     Labs:  CMP  Recent Labs   Lab 05/24/22  0810 05/23/22  1759 05/23/22  0614 05/22/22  1743 05/22/22  0712 05/21/22  1833 05/21/22  0433 05/18/22  1626 05/18/22  0636    134 138 134 135   < > 136   < > 138   POTASSIUM 4.2 4.0 3.4 4.0 4.4   < > 4.6   < > 3.6   CHLORIDE 107 103 106 101 105   < > 108   < > 104   CO2 24 23 23 25 19*   < > 20   < > 22   ANIONGAP 8 8 9 8 11   < > 8   < > 12   GLC 83 88 85 147* 82   < > 82   < > 124*   BUN 11 11 11 15 16   < > 15   < > 28   CR 0.91 0.97 0.95 1.01 0.94   < > 0.72   < > 1.20   GFRESTIMATED >90 89 >90 85 >90   < > >90   < > 69   ELDA 8.6 8.6 8.8 9.2 9.5   < > 9.2   < > 9.2   MAG 2.2  --  2.0  --  2.3  --  2.4*   < > 2.2   PHOS  --   --   --   --   --   --   --   --  3.4   PROTTOTAL 6.7* 6.6* 6.5* 8.0 7.3   < > 6.7*   < > 7.5 "   ALBUMIN 3.2* 3.1* 3.1* 3.8 3.4   < > 3.2*   < > 3.5   BILITOTAL 0.7 0.6 0.6 0.6 0.5   < > 0.6   < > 0.7   ALKPHOS 74 69 67 82 72   < > 71   < > 86   AST 16 18 15 21 26   < > 21   < > 74*   ALT 51 59 63 88* 86*   < > 110*   < > 158*    < > = values in this interval not displayed.       CBC  Recent Labs   Lab 05/24/22  0810 05/23/22  0614 05/22/22  0712 05/20/22  0516   WBC 4.1 4.1 5.7 5.5   RBC 3.06* 3.04* 3.47* 2.99*   HGB 8.8* 8.7* 9.8* 8.6*   HCT 28.6* 27.7* 32.7* 27.4*   MCV 94 91 94 92   MCH 28.8 28.6 28.2 28.8   MCHC 30.8* 31.4* 30.0* 31.4*   RDW 18.3* 18.0* 18.7* 18.8*    251 274 268       INR  Recent Labs   Lab 05/24/22  0810 05/23/22  0614 05/22/22  0712 05/21/22  0433   INR 1.23* 1.19* 1.13 1.17*       Time/Communication  I personally spent a total of 40 minutes. Of that >25 minutes was counseling/coordination of patient's care. Plan of care discussed with patient. See my note above for details.    Patient discussed with Dr. Coóln.

## 2022-05-24 NOTE — PROGRESS NOTES
"CLINICAL NUTRITION SERVICES - REASSESSMENT NOTE     Nutrition Prescription    RECOMMENDATIONS FOR MDs/PROVIDERS TO ORDER:  Recommend liberalize diet order to a 3 g sodium diet if inadequate nutrition intake this admission.     Malnutrition Status:    Severe malnutrition in the context of chronic illness.    Recommendations already ordered by Registered Dietitian (RD):  None at this time    Future/Additional Recommendations:  Monitor labs, intakes, and weight trends.  Supplement tolerance  If EN becomes POC:  --Pending AXR confirmation of feeding tube position  --GOAL: Osmolite 1.5 Virgilio @ goal of  60ml/hr  (1440ml/day)  will provide: 2160 kcals, 90 g PRO, 1097 ml free H20, 293 g CHO, and 0 g fiber daily.        If begin tube feeds:               - Flush FT with 30 mL water Q 4 hrs for patency. Rec provider adjust free water flushes as needed, pending fluid status.              - Ensure K+/Mg++/Phos labs are ordered daily until TFs advance to goal infusion to evaluate for sx of refeeding with nutrition received. If lytes trend low, aggressively replace lytes. Do not adv TF greater than 30 mL/hr unless K+ >3, Mg++ > 1.5, and Phos > 1.9.              - If not already ordered, order a multivitamin/mineral (certavite or liquid multivitamin/minerals 15 mL/day via FT) to help ensure micronutrient needs being met with suspected hypermetabolic demands and potential interruptions to TF infusions.              - Monitor TF and possible need to adjust nutrition support regimen if necessary, pending medical course and nutrition status.                  - Monitor need to check a metabolic cart study.               - Send a nutrition consult for \"Registered Dietitian to Order TF per Medical Nutrition Therapy Guidelines\" if desire RD to order TFs.      EVALUATION OF THE PROGRESS TOWARD GOALS   Diet: NPO for angiogram today, previously 2 g Sodium, 2000 mL Fluid Restriction   Snacks/supplements: Gelatein plus with meals  Intake: 0-75% " of documented meals - limited documentation     NEW FINDINGS/REVIEW OF SYSTEMS    PMH: CAD s/p remote PCI to LAD, ICM LVEF 30% s/p CRT-D, severe MR, APL with hx of DVT/PE on chronic anticoagulation with warfarin, hx of COVID-19 1/2022 (was hospitalized, not intubated), HTN, and HLD who was admitted to [facility] 5/16/2022 as a transfer from [facility] in [place] for evaluation of multivessel coronary artery disease and moderate-severe functional mitral regurgitation. Found to have dilated LV with LVEF 10-20% making him high-risk surgical candidate. Currently undergoing LVAD/Transplant work up.    Nutrition/GI: Patient out of room during attempts to visit. Colonoscopy yesterday, no abnormal findings.     Weights: Pt with 11 lb (7%) weight loss over 1 week during discharge, patient has been on lasix. Pt stated usual body weight of 185 lb per RD note 5/17. Pt with 20% weight loss over last 4-5 months.   05/24/22 0641 67.2 kg (148 lb 3.2 oz) Standing scale   05/23/22 0201 67 kg (147 lb 11.2 oz) Standing scale   05/22/22 0420 66.2 kg (146 lb) Standing scale   05/21/22 0000 71.6 kg (157 lb 13.6 oz) Bed scale   05/20/22 0400 71.4 kg (157 lb 6.5 oz) Bed scale   05/19/22 0445 71.7 kg (158 lb 1.1 oz) Bed scale   05/17/22 0759 71.5 kg (157 lb 11.2 oz) Standing scale   05/17/22 0237 71.7 kg (158 lb 1.6 oz) Standing scale   05/16/22 1207 66.8 kg (147 lb 4.3 oz) --      Labs reviewed     Medications: pepcid, iron, lasix, multivitamin, potassium chloride, thiamine    Updated dosing weight: 67 kg - lowest weight this admission    UPDATED ASSESSED NUTRITION NEEDS  Estimated Energy Needs: 6400-6308 kcals/day (25 - 30 kcals/kg)  Justification: Maintenance  Estimated Protein Needs: 74-94 grams protein/day (1.1 - 1.4 grams of pro/kg)  Justification: HF guidelines  Estimated Fluid Needs: 2000 mL/day   Justification: On a fluid restriction.       MALNUTRITION  % Intake: Unable to assess  % Weight Loss: > 7.5% in 3 months (severe)    Subcutaneous Fat Loss: Facial region: Severe; Arms: Mild - per previous RD note  Muscle Loss: Temporal:  Moderate, Thoracic region (clavicle, acromium bone, deltoid, trapezius, pectoral):  mild and Posterior calf:  Moderate - per previous RD note  Fluid Accumulation/Edema: None noted  Malnutrition Diagnosis: Severe malnutrition in the context of chronic illness.    Previous Goals   Patient to consume % of nutritionally adequate meal trays TID, or the equivalent with supplements/snacks.  Evaluation: Not met    Previous Nutrition Diagnosis  Inadequate oral intake related to decreased appetite, taste changes, and low-sodium diet order as evidenced by pt consuming less than 50% of his usual oral intake for greater than two months.  Evaluation: No change    CURRENT NUTRITION DIAGNOSIS  Inadequate oral intake related to decreased appetite, taste changes, and low-sodium diet order as evidenced by pt consuming less than 50% of his usual oral intake for greater than two months.     INTERVENTIONS  Implementation  None at this time    Goals  Patient to consume % of nutritionally adequate meal trays TID, or the equivalent with supplements/snacks.    Monitoring/Evaluation  Progress toward goals will be monitored and evaluated per protocol.    Ann Freeman MS, RDN, LDN  6C RD pager: [phone]  Weekend/Holiday RD pager: [phone]

## 2022-05-24 NOTE — PLAN OF CARE
D:  admitted to [facility] 5/16/2022 as a transfer from OSH for evaluation of multivessel CAD and moderate-severe functional mitral regurgitation. Found to have dilated LV with LVEF 10-20% making him high-risk surgical candidate. Currently undergoing LVAD/Transplant work up.  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 MD notified regarding soft BP. Ordered parameters for captopril-MN dose held.  Changed: NPO for angiogram  Running: PIV SL'd  PRN: Atarax, Ativan, Ambien     A: A0x4. VSS , on RA. Monitor SR/ST  w/0-11 PAC with cplts. Denies c/o pain. Appeared to sleep well between cares. Good UO. No issues or complaints overnight.    I/O this shift:  In: -   Out: 800 [Urine:800]    Temp:  [97.7  F (36.5  C)-98.8  F (37.1  C)] 98.2  F (36.8  C)  Pulse:  [78-96] 82  Resp:  [16] 16  BP: (73-96)/(51-66) 91/61  SpO2:  [95 %-99 %] 95 %      P: Continue to monitor Pt status and report changes to treatment team. Plan for angiogram today.

## 2022-05-24 NOTE — PLAN OF CARE
"D: pt admitted on 5/16 from OSH for evaluation of multiple vessel CAD and moderate to severe functional mitral regurgitation. Found to have dilated left ventrical with LVEF 10-20% making him high risk surgical candidate. Now having LVAD/transplant workup.  PMH of CAD s/p remote PCI to LAD, ICM LVEF 30% s/p CRT-D, severe mitral regurgitation, APL with history of DVT/PE on chronic anticoagulation with warfarin, hx of COVID-19 (1/22) (was hospitalized, not intubated), HTN, HLD     I: Monitored vitals and assessed pt status.   Changes during this shift: RHC with placement of 2 PCI.  Running:   PRN Med:      Vitals: Blood pressure 117/77, pulse 99, temperature 98.3  F (36.8  C), temperature source Oral, resp. rate 16, height 1.715 m (5' 7.5\"), weight 67.2 kg (148 lb 3.2 oz), SpO2 97 %.     A:   Neuro: Ax4 Denies Headache, dizziness,  Lightheadedness, numbness and tingling. calls appropriately   Cardiac: SR, BBB HR 80s-high 90s  Afebrile, VSS.  Denies chest pain.   Respiratory: sating >95 ON RA. denies SOB. LS clear bilaterally.   Diet/appetite: 2gNa diet.   GI/:  Last BM 5/24. Denies abdominal pain. Good urine output to bedside urinal.  Activity:  Moves independently.  Pain: Denies  Skin: RPIV SL.RIJ with sheath SL, to be removed when ACT <150. Next ACT draw at 1900. Right groin site WNL.  Plan: Continue to monitor and follow plan of care. Notify Cards 2 team of any change in patient status. Neuro consult tomorrow scheduled for 9am. LVAD teaching scheduled tomorrow at 1345.    "

## 2022-05-24 NOTE — PROGRESS NOTES
Brief rheumatology update:    Patient now back on MMF given no surgery is planned at this time, and patient now undergoing LVAD evaluation. Rheumatology will sign-off at this time. Please re-engage our team for any questions or concerns that may arise.    Ngoc Tinsley MD  Rheumatology Fellow  5/24/2022

## 2022-05-24 NOTE — PROGRESS NOTES
CV Surgery    Patient initally presented for evaluation for CABG/MVR. Repeat echo showed LVEF 10%. Decision was made to pursue LVAD/transplant work-up after under going PCI which is planned for today. Will continue to discuss patient at weekly LVAD/transplant conference. CVTS will sign off at this time, please do not hesitate to call with questions or concerns.     Grant Gomez PA-C  Cardiothoracic Surgery  p: [phone]

## 2022-05-24 NOTE — DISCHARGE INSTRUCTIONS
Call [facility] to establish care [phone]---They will call you with appointment day/time_____    We will call you about an appointment with an [facility] doctor who comes to [place] once a month    [facility] your [facility]---pt will call to arrange    Dandy, you are to continue going to your [facility] clinic for 1-2 months and then transfer to [place] [facility]: New primary Care MD      To transfer to [place] for Primary Care MD/Internal medicine  please call:  Ph; [phone]  Fax: [phone]  And they will help you find a new doctor/and to manage your warfarin and INR labs  This clinic is at [address]  Days: Monday-Friday  Your discharge orders and summary have been fax'd to them on 5/27/22      ______________________________________________________________________________________________

## 2022-05-24 NOTE — PHARMACY-ANTICOAGULATION SERVICE
Clinical Pharmacy - Warfarin Dosing Consult     Pharmacy has been consulted to manage this patient s warfarin therapy.  Indication: DVT/ PE Treatment  Therapy Goal: INR 2-3  Warfarin Prior to Admission: Yes  Warfarin PTA Regimen: 6.5 mg daily  Dose Comments: aspirin, ticagrelor, bridging with lovenox (all increase bleeding risk)    INR   Date Value Ref Range Status   05/24/2022 1.23 (H) 0.85 - 1.15 Final   05/23/2022 1.19 (H) 0.85 - 1.15 Final       Recommend warfarin 5 mg today.  This dose is smaller than his home dose but patient has been hospitalized for >2 weeks and nutrition status may not be the same as at home. Patient also admits to requiring dose decreases very consistently over the last month. Pharmacy will monitor Dandy Sands daily and order warfarin doses to achieve specified goal.      Please contact pharmacy as soon as possible if the warfarin needs to be held for a procedure or if the warfarin goals change.      Marcelina Rouse, PharmD

## 2022-05-24 NOTE — PROGRESS NOTES
Care Management Follow Up    Length of Stay (days): 8    Expected Discharge Date: 05/26/2022     Concerns to be Addressed: Discharge planning, cardiology follow up, cardiac rehab  Patient plan of care discussed at interdisciplinary rounds: Yes    Anticipated Discharge Disposition: Home     Anticipated Discharge Services: OP CR  Anticipated Discharge DME: N/A    Referrals Placed by CM/SW:  Cardiology Referral, OP CR  Private pay costs discussed: Not applicable    Additional Information:  This writer received an update from the NP that pt will likely be medically ready to discharge in the next 1-2 days and will need close cardiology follow up in [place], [place]. Will be seen by [facility] Cardiologist when they are next in [place] but will need to be seen locally in the meantime. Per NP, she spoke with pt and he prefers to be in the [facility] system but does not have a provider preference. NP placed referral and this writer faxed referral for cardiology follow (requesting 1-2 weeks) and OP CR to the [facility] Referral line (Fax: [phone]) and requested a call back.    CC will continue to monitor patient's medical condition and progress towards discharge.  Taylor Schmitz RN BSN  6C Unit Care Coordinator  Phone number: [phone]  Pager: [phone]    Addendum 5/25 at 1430: Received update from NP that Dr. Mendez [facility] and requested they call pt to schedule follow up.

## 2022-05-24 NOTE — CONSULTS
"61 year old male presenting with dyspnea. CORE consult requested.     Dandy was provided with a CHF book.  I reviewed the importance of daily weights, 2 gm sodium diet, 2L fluid restriction and compliance with medications upon hospital discharge.  CORE contact phone numbers provided and patient is encouraged to call with any questions or concerns, including any weight gain or loss of 2 or more pounds in 24 hours or 5 or more pounds in 1 week.      Dandy will follow up with [facility] heart team in [place] and Dr Stewart when he is in [place].  Thank you for the consult.    Peg Prado RN BSN  Cardiology Care Coordinator - Heart Failure/ [facility]  [facility]   Questions and [phone]   First press #1 for the [facility] and then press #4 for \"Send a message to your care team\"         "

## 2022-05-25 LAB
A*: NORMAL
A*LOCUS SEROLOGIC EQUIVALENT: 1
A*LOCUS: NORMAL
A*SEROLOGIC EQUIVALENT: 3
ABTEST METHOD: NORMAL
ALBUMIN SERPL-MCNC: 3.3 G/DL (ref 3.4–5)
ALBUMIN SERPL-MCNC: 3.5 G/DL (ref 3.4–5)
ALP SERPL-CCNC: 74 U/L (ref 40–150)
ALP SERPL-CCNC: 82 U/L (ref 40–150)
ALT SERPL W P-5'-P-CCNC: 46 U/L (ref 0–70)
ALT SERPL W P-5'-P-CCNC: 47 U/L (ref 0–70)
ANION GAP SERPL CALCULATED.3IONS-SCNC: 10 MMOL/L (ref 3–14)
ANION GAP SERPL CALCULATED.3IONS-SCNC: 8 MMOL/L (ref 3–14)
AST SERPL W P-5'-P-CCNC: 34 U/L (ref 0–45)
AST SERPL W P-5'-P-CCNC: 36 U/L (ref 0–45)
ATRIAL RATE - MUSE: 77 BPM
ATRIAL RATE - MUSE: 94 BPM
B*: NORMAL
B*LOCUS SEROLOGIC EQUIVALENT: 35
B*LOCUS: NORMAL
B*SEROLOGIC EQUIVALENT: 57
BASOPHILS # BLD AUTO: 0 10E3/UL (ref 0–0.2)
BASOPHILS NFR BLD AUTO: 0 %
BILIRUB SERPL-MCNC: 0.6 MG/DL (ref 0.2–1.3)
BILIRUB SERPL-MCNC: 0.7 MG/DL (ref 0.2–1.3)
BUN SERPL-MCNC: 14 MG/DL (ref 7–30)
BUN SERPL-MCNC: 15 MG/DL (ref 7–30)
BW-1: NORMAL
BW-2: NORMAL
C*: NORMAL
C*LOCUS SEROLOGIC EQUIVALENT: 4
C*LOCUS: NORMAL
C*SEROLOGIC EQUIVALENT: 6
CALCIUM SERPL-MCNC: 8.8 MG/DL (ref 8.5–10.1)
CALCIUM SERPL-MCNC: 9.1 MG/DL (ref 8.5–10.1)
CHLORIDE BLD-SCNC: 100 MMOL/L (ref 94–109)
CHLORIDE BLD-SCNC: 101 MMOL/L (ref 94–109)
CO2 SERPL-SCNC: 22 MMOL/L (ref 20–32)
CO2 SERPL-SCNC: 26 MMOL/L (ref 20–32)
CREAT SERPL-MCNC: 0.97 MG/DL (ref 0.66–1.25)
CREAT SERPL-MCNC: 1.09 MG/DL (ref 0.66–1.25)
DIASTOLIC BLOOD PRESSURE - MUSE: NORMAL MMHG
DIASTOLIC BLOOD PRESSURE - MUSE: NORMAL MMHG
DPA1*: NORMAL
DPB1*: NORMAL
DPB1*LOCUS NMDP: NORMAL
DPB1*LOCUS: NORMAL
DPB1*NMDP: NORMAL
DQA1*LOCUS: NORMAL
DQB1*LOCUS NMDP: NORMAL
DQB1*LOCUS SEROLOGIC EQUIVALENT: 7
DQB1*LOCUS: NORMAL
DRB1*: NORMAL
DRB1*LOCUS SEROLOGIC EQUIVALENT: 11
DRB1*LOCUS: NORMAL
DRB1*SEROLOGIC EQUIVALENT: 13
DRB3*: NORMAL
DRB3*LOCUS NMDP: NORMAL
DRB3*LOCUS SEROLOGIC EQUIVALENT: 52
DRB3*LOCUS: NORMAL
DRB3*NMDP: NORMAL
DRB3*SEROLOGIC EQUIVALENT: 52
DRSSO TEST METHOD: NORMAL
EOSINOPHIL # BLD AUTO: 0.1 10E3/UL (ref 0–0.7)
EOSINOPHIL NFR BLD AUTO: 1 %
ERYTHROCYTE [DISTWIDTH] IN BLOOD BY AUTOMATED COUNT: 17.8 % (ref 10–15)
GFR SERPL CREATININE-BSD FRML MDRD: 78 ML/MIN/1.73M2
GFR SERPL CREATININE-BSD FRML MDRD: 89 ML/MIN/1.73M2
GLUCOSE BLD-MCNC: 120 MG/DL (ref 70–99)
GLUCOSE BLD-MCNC: 84 MG/DL (ref 70–99)
HCT VFR BLD AUTO: 30 % (ref 40–53)
HGB BLD-MCNC: 9.5 G/DL (ref 13.3–17.7)
IMM GRANULOCYTES # BLD: 0 10E3/UL
IMM GRANULOCYTES NFR BLD: 1 %
INR PPP: 1.21 (ref 0.85–1.15)
INTERPRETATION ECG - MUSE: NORMAL
INTERPRETATION ECG - MUSE: NORMAL
LYMPHOCYTES # BLD AUTO: 0.7 10E3/UL (ref 0.8–5.3)
LYMPHOCYTES NFR BLD AUTO: 11 %
MAGNESIUM SERPL-MCNC: 2.2 MG/DL (ref 1.6–2.3)
MCH RBC QN AUTO: 29.1 PG (ref 26.5–33)
MCHC RBC AUTO-ENTMCNC: 31.7 G/DL (ref 31.5–36.5)
MCV RBC AUTO: 92 FL (ref 78–100)
MONOCYTES # BLD AUTO: 0.6 10E3/UL (ref 0–1.3)
MONOCYTES NFR BLD AUTO: 10 %
NEUTROPHILS # BLD AUTO: 4.6 10E3/UL (ref 1.6–8.3)
NEUTROPHILS NFR BLD AUTO: 77 %
NRBC # BLD AUTO: 0 10E3/UL
NRBC BLD AUTO-RTO: 0 /100
P AXIS - MUSE: 42 DEGREES
P AXIS - MUSE: 77 DEGREES
PLATELET # BLD AUTO: 250 10E3/UL (ref 150–450)
POTASSIUM BLD-SCNC: 3.8 MMOL/L (ref 3.4–5.3)
POTASSIUM BLD-SCNC: 4.2 MMOL/L (ref 3.4–5.3)
PR INTERVAL - MUSE: 220 MS
PR INTERVAL - MUSE: 224 MS
PROT SERPL-MCNC: 7 G/DL (ref 6.8–8.8)
PROT SERPL-MCNC: 7.4 G/DL (ref 6.8–8.8)
QRS DURATION - MUSE: 156 MS
QRS DURATION - MUSE: 156 MS
QT - MUSE: 416 MS
QT - MUSE: 468 MS
QTC - MUSE: 520 MS
QTC - MUSE: 529 MS
R AXIS - MUSE: -42 DEGREES
R AXIS - MUSE: -49 DEGREES
RBC # BLD AUTO: 3.27 10E6/UL (ref 4.4–5.9)
SODIUM SERPL-SCNC: 133 MMOL/L (ref 133–144)
SODIUM SERPL-SCNC: 134 MMOL/L (ref 133–144)
SYSTOLIC BLOOD PRESSURE - MUSE: NORMAL MMHG
SYSTOLIC BLOOD PRESSURE - MUSE: NORMAL MMHG
T AXIS - MUSE: 102 DEGREES
T AXIS - MUSE: 67 DEGREES
VENTRICULAR RATE- MUSE: 77 BPM
VENTRICULAR RATE- MUSE: 94 BPM
WBC # BLD AUTO: 5.9 10E3/UL (ref 4–11)

## 2022-05-25 PROCEDURE — 90791 PSYCH DIAGNOSTIC EVALUATION: CPT | Mod: 95 | Performed by: CLINICAL NEUROPSYCHOLOGIST

## 2022-05-25 PROCEDURE — 250N000012 HC RX MED GY IP 250 OP 636 PS 637: Performed by: STUDENT IN AN ORGANIZED HEALTH CARE EDUCATION/TRAINING PROGRAM

## 2022-05-25 PROCEDURE — 85610 PROTHROMBIN TIME: CPT

## 2022-05-25 PROCEDURE — 999N000157 HC STATISTIC RCP TIME EA 10 MIN

## 2022-05-25 PROCEDURE — 83735 ASSAY OF MAGNESIUM: CPT

## 2022-05-25 PROCEDURE — 250N000013 HC RX MED GY IP 250 OP 250 PS 637

## 2022-05-25 PROCEDURE — 250N000013 HC RX MED GY IP 250 OP 250 PS 637: Performed by: STUDENT IN AN ORGANIZED HEALTH CARE EDUCATION/TRAINING PROGRAM

## 2022-05-25 PROCEDURE — 250N000013 HC RX MED GY IP 250 OP 250 PS 637: Performed by: INTERNAL MEDICINE

## 2022-05-25 PROCEDURE — 36415 COLL VENOUS BLD VENIPUNCTURE: CPT | Performed by: STUDENT IN AN ORGANIZED HEALTH CARE EDUCATION/TRAINING PROGRAM

## 2022-05-25 PROCEDURE — 80053 COMPREHEN METABOLIC PANEL: CPT | Performed by: STUDENT IN AN ORGANIZED HEALTH CARE EDUCATION/TRAINING PROGRAM

## 2022-05-25 PROCEDURE — 214N000001 HC R&B CCU UMMC

## 2022-05-25 PROCEDURE — 99207 PR SC NO CHARGE VISIT: CPT | Performed by: NURSE PRACTITIONER

## 2022-05-25 PROCEDURE — 99232 SBSQ HOSP IP/OBS MODERATE 35: CPT | Mod: FS | Performed by: INTERNAL MEDICINE

## 2022-05-25 PROCEDURE — 99207 PR NO BILLABLE SERVICE THIS VISIT: CPT | Mod: FS | Performed by: INTERNAL MEDICINE

## 2022-05-25 PROCEDURE — 84450 TRANSFERASE (AST) (SGOT): CPT | Performed by: STUDENT IN AN ORGANIZED HEALTH CARE EDUCATION/TRAINING PROGRAM

## 2022-05-25 PROCEDURE — 99233 SBSQ HOSP IP/OBS HIGH 50: CPT | Performed by: NURSE PRACTITIONER

## 2022-05-25 PROCEDURE — 96132 NRPSYC TST EVAL PHYS/QHP 1ST: CPT | Mod: 95 | Performed by: CLINICAL NEUROPSYCHOLOGIST

## 2022-05-25 PROCEDURE — 96133 NRPSYC TST EVAL PHYS/QHP EA: CPT | Mod: 95 | Performed by: CLINICAL NEUROPSYCHOLOGIST

## 2022-05-25 PROCEDURE — 93005 ELECTROCARDIOGRAM TRACING: CPT

## 2022-05-25 PROCEDURE — 250N000011 HC RX IP 250 OP 636: Performed by: PHYSICIAN ASSISTANT

## 2022-05-25 PROCEDURE — 250N000013 HC RX MED GY IP 250 OP 250 PS 637: Performed by: PHYSICIAN ASSISTANT

## 2022-05-25 PROCEDURE — 85025 COMPLETE CBC W/AUTO DIFF WBC: CPT

## 2022-05-25 RX ORDER — POTASSIUM CHLORIDE 1500 MG/1
20 TABLET, EXTENDED RELEASE ORAL 2 TIMES DAILY
Qty: 60 TABLET | Refills: 1 | Status: ON HOLD | OUTPATIENT
Start: 2022-05-25 | End: 2022-08-21

## 2022-05-25 RX ORDER — POTASSIUM CHLORIDE 750 MG/1
20 TABLET, EXTENDED RELEASE ORAL ONCE
Status: COMPLETED | OUTPATIENT
Start: 2022-05-25 | End: 2022-05-25

## 2022-05-25 RX ORDER — LISINOPRIL 2.5 MG/1
2.5 TABLET ORAL 2 TIMES DAILY
Qty: 60 TABLET | Refills: 1 | Status: ON HOLD | OUTPATIENT
Start: 2022-05-25 | End: 2022-05-30

## 2022-05-25 RX ORDER — LISINOPRIL 2.5 MG/1
2.5 TABLET ORAL 2 TIMES DAILY
Status: DISCONTINUED | OUTPATIENT
Start: 2022-05-25 | End: 2022-05-26 | Stop reason: HOSPADM

## 2022-05-25 RX ORDER — WARFARIN SODIUM 5 MG/1
5 TABLET ORAL
Status: COMPLETED | OUTPATIENT
Start: 2022-05-25 | End: 2022-05-25

## 2022-05-25 RX ORDER — DULOXETIN HYDROCHLORIDE 30 MG/1
30 CAPSULE, DELAYED RELEASE ORAL DAILY
Qty: 30 CAPSULE | Refills: 0 | Status: SHIPPED | OUTPATIENT
Start: 2022-05-26 | End: 2022-08-21

## 2022-05-25 RX ORDER — LANOLIN ALCOHOL/MO/W.PET/CERES
100 CREAM (GRAM) TOPICAL DAILY
Qty: 30 TABLET | Refills: 0 | Status: SHIPPED | OUTPATIENT
Start: 2022-05-26 | End: 2022-09-15

## 2022-05-25 RX ORDER — FUROSEMIDE 40 MG
40 TABLET ORAL
Status: DISCONTINUED | OUTPATIENT
Start: 2022-05-25 | End: 2022-05-26 | Stop reason: HOSPADM

## 2022-05-25 RX ADMIN — DULOXETINE 30 MG: 30 CAPSULE, DELAYED RELEASE ORAL at 08:15

## 2022-05-25 RX ADMIN — FAMOTIDINE 20 MG: 20 TABLET ORAL at 08:17

## 2022-05-25 RX ADMIN — POTASSIUM CHLORIDE 20 MEQ: 750 TABLET, EXTENDED RELEASE ORAL at 12:10

## 2022-05-25 RX ADMIN — ZOLPIDEM TARTRATE 5 MG: 5 TABLET, FILM COATED ORAL at 23:22

## 2022-05-25 RX ADMIN — HYDROXYCHLOROQUINE SULFATE 200 MG: 200 TABLET, FILM COATED ORAL at 20:22

## 2022-05-25 RX ADMIN — POTASSIUM CHLORIDE 20 MEQ: 750 TABLET, EXTENDED RELEASE ORAL at 20:22

## 2022-05-25 RX ADMIN — ATORVASTATIN CALCIUM 40 MG: 40 TABLET, FILM COATED ORAL at 08:17

## 2022-05-25 RX ADMIN — POTASSIUM CHLORIDE 20 MEQ: 750 TABLET, EXTENDED RELEASE ORAL at 08:17

## 2022-05-25 RX ADMIN — LISINOPRIL 2.5 MG: 2.5 TABLET ORAL at 20:21

## 2022-05-25 RX ADMIN — MYCOPHENOLATE MOFETIL 1500 MG: 500 TABLET, FILM COATED ORAL at 08:17

## 2022-05-25 RX ADMIN — TICAGRELOR 90 MG: 90 TABLET ORAL at 20:22

## 2022-05-25 RX ADMIN — FERROUS SULFATE TAB 325 MG (65 MG ELEMENTAL FE) 325 MG: 325 (65 FE) TAB at 12:09

## 2022-05-25 RX ADMIN — FUROSEMIDE 40 MG: 40 TABLET ORAL at 12:09

## 2022-05-25 RX ADMIN — THIAMINE HCL TAB 100 MG 100 MG: 100 TAB at 08:17

## 2022-05-25 RX ADMIN — FAMOTIDINE 20 MG: 20 TABLET ORAL at 20:22

## 2022-05-25 RX ADMIN — Medication 1 TABLET: at 12:09

## 2022-05-25 RX ADMIN — ENOXAPARIN SODIUM 70 MG: 80 INJECTION SUBCUTANEOUS at 23:22

## 2022-05-25 RX ADMIN — ENOXAPARIN SODIUM 70 MG: 80 INJECTION SUBCUTANEOUS at 12:10

## 2022-05-25 RX ADMIN — TICAGRELOR 90 MG: 90 TABLET ORAL at 08:16

## 2022-05-25 RX ADMIN — HYDROXYCHLOROQUINE SULFATE 200 MG: 200 TABLET, FILM COATED ORAL at 08:17

## 2022-05-25 RX ADMIN — MYCOPHENOLATE MOFETIL 1500 MG: 500 TABLET, FILM COATED ORAL at 20:21

## 2022-05-25 RX ADMIN — WARFARIN SODIUM 5 MG: 5 TABLET ORAL at 18:11

## 2022-05-25 RX ADMIN — FUROSEMIDE 40 MG: 40 TABLET ORAL at 16:48

## 2022-05-25 RX ADMIN — HYDROXYZINE HYDROCHLORIDE 50 MG: 50 TABLET ORAL at 01:59

## 2022-05-25 RX ADMIN — TAMSULOSIN HYDROCHLORIDE 0.4 MG: 0.4 CAPSULE ORAL at 08:15

## 2022-05-25 RX ADMIN — HYDROXYZINE HYDROCHLORIDE 50 MG: 50 TABLET ORAL at 20:21

## 2022-05-25 RX ADMIN — Medication 5 MG: at 20:22

## 2022-05-25 RX ADMIN — LISINOPRIL 2.5 MG: 2.5 TABLET ORAL at 12:09

## 2022-05-25 ASSESSMENT — ACTIVITIES OF DAILY LIVING (ADL)
ADLS_ACUITY_SCORE: 21
ADLS_ACUITY_SCORE: 21
ADLS_ACUITY_SCORE: 19
ADLS_ACUITY_SCORE: 19
ADLS_ACUITY_SCORE: 21
ADLS_ACUITY_SCORE: 21
ADLS_ACUITY_SCORE: 19
ADLS_ACUITY_SCORE: 19
ADLS_ACUITY_SCORE: 21
ADLS_ACUITY_SCORE: 21
ADLS_ACUITY_SCORE: 19
ADLS_ACUITY_SCORE: 21

## 2022-05-25 NOTE — PROGRESS NOTES
Nutrition Services Brief Note    Consult received for general heart healthy diet    Patient has been educated on LVAD/transplant diet and 2 gram Na diet this admission.     Unit RD will continue to monitor per protocol  Pepper Howe, MS/RD/JENARO  6C RD pager: [phone]  Weekend/Holiday RD pager: [phone]

## 2022-05-25 NOTE — PROGRESS NOTES
[facility]   Cardiology II Service / Advanced Heart Failure  Daily Progress Note      Patient: Dandy Sands  MRN: [id number]  Admission Date: 5/16/2022  Hospital Day # 9    Assessment and Plan: Dandy Sands is a 61 year old male with history of CAD s/p remote PCI to LAD, ICM LVEF 30% s/p CRT-D, severe MR, APL with hx of DVT/PE on chronic anticoagulation with warfarin, hx of COVID-19 1/2022 (was hospitalized, not intubated), HTN, and HLD who was admitted to [facility] 5/16/2022 as a transfer from [facility] in [place] for evaluation of multivessel coronary artery disease and moderate-severe functional mitral regurgitation. Found to have dilated LV with LVEF 10-20% making him high-risk surgical candidate. Currently undergoing LVAD/Transplant work up.    Today's Plan:  - Neuropsych testing  - LVAD teaching  - Resume lovenox as bridge to theraputic INR, continue coumadin  - Brilinta 90 mg BID, no ASA given Coumadin/Lovenox  - Stopped captopril, started lisinopril 2.5 mg BID  - CVTS consult (LVAD/Transplant work-up)  - Palliative care consult after LVAD Patient educatin  - Arranging for outpatient follow-up with [facility] per patient preference)      # Acute on chronic systolic heart failure/HFrEF secondary to ICM (LVEF 10-20%)    Stage C. NYHA Class III.   # Severe Functional MR  # Dilated LV (LVIDd 6.1 cm)  Transferred from [facility] in [place] 5/16/2022 for evaluation for CABG + MVR at a center with ECMO/VAD as a back up. However at the OSH, his EF was reported 30-35%, and EF here is 10-20% (on TTE and CMRI) with severely dilated LV. CMRI showing infarcted myocardium in LAD territory. Given the degree of LV dysfunction/dilation, moderate to severe functional MR and lack of viable myocardium, cardiac surgery would be high risk. Have opted instread for RCA and LAD stenting and medical management, while also evaluating for advanced therapies in the event that he is not thriving  on the above plan.    Fluid status: near euvolemic, continue lasix 40 mg PO BID  ACEi/ARB/ARNI/afterload reduction: Started lisinopril 2.5 mg BID  BB: Holding in the setting of cardiogenic shock  Aldosterone antagonist: deferred while other medical therapy is prioritized  SGLT2i: deferred while other medical therapy is prioritized  SCD prophylaxis: CRT-D  Other: Will need repeat RHC in 2-3 months to assess elevated PVR    LVAD/Transplant Evaluation Checklist  [x]??? labs (CBC, CMP, PT/INR, cystatin C, prealbumin, UA + micro)  [x]??? Infectious (Hep A/B/C, HIV, treponema, HSV 1/2 IgG, CMV IgG, Toxo IgG, EBV IgG, varicella IgG, Quant gold, COVID vaccine/PCR)  [x]??? Utox/nicotine and cotinine/PeTH   []??? Immunocompatibility (last transfusion, ABO, HLA tissue typing, PRA): in process  [x]??? ECG, Echo  []??? CPX - can be oupatient  []??? 6MWT   - Ordered, to be completed Monday 5/23  [x]??? RHC, completed but needs repeat in 2-3 months to assess PVR  [x]??? CVTS consult  [x]??? Social work consult  []???  Palliative care consult: order placed, consult pending  []??? Neuropsych consult: order placed, consult pending  [x]??? Nutrition consult  [x]??? CT Dental   [x]??? Dental consult  [x]??? Abd US + doppler  - Abd US complete done, not with doppler; OK given unremarkable CT CAP  [x]??? Extremity US and ABIs  [x]??? Carotid US (if DM or ICM or >49yo)  []??? PFTs: outpatient  []??? Dexa: outpatient  [x]??? CT head non-contrast  [x]??? CT CAP non-contrast  [x]??? Colonoscopy (>51 yo)  [x]??? PSA/HCG     # CAD s/p PCI to LAD (2005)  # Hx of MI (2007)  # Severe ostial LAD disease with in-stent restenosis of mid LAD at diag bifurcation, severe proximal RCA disease, mild circ disease now s/p ostial LAD and proximal RCA lithotripsy and stenting on 5/24/22  # S/p CRT-D ([employer] 2006, gen change 2012)  - Brilinta and Coumadin (Lovenox bridge to therapeutic INR)  - Not on BB d/t low output     # SLE  # APL  # Hx of DVT and  PE  PTA on warfarin. Transitioned to Lovenox at OSH in anticipation of surgery. He reports that his main symptom of lupus is diffuse joint pains that is tolerable with Tylenol.   - Rheumatology consulted, as above   - Lovenox 70mg q12h (resumed this AM))  - Continue pta hydroxychloroquine 200mg bid   - PTA mycophenolic acid 1500mg q12h  -- In future, would plan to hold MMF one week prior to surgery and re-starting 5-7 days after surgery in the absence of surgical site infection, poor wound healing or infection elsewhere.  - Tylenol prn for pain  - PCP will continue to manage his coumadin     # SVT  # Nonsustained VT  SVT noted 5/21 with rates up to 165bpm self terminating after up to 40 secs, 3 episodes at 4pm, hemodynamically stable. Has continued to have some NSVT since.  Asymptomatic.  - Telemetry  - Monitor lytes     # Anemia, chronic  - Continue pta ferrous sulfate 325mg daily      # High risk for SUHAIL  Pt had plan for outpatient sleep study, missed due to this hospitalization  - Will need to reschedule outpatient     # Severe malnutrition in the context of chronic illness  Supplements to help maintain nutrition.     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 Currently no issues. Former smoker 2 ppd for 40 years quit on 09/09/09.      # Liver injury, mixed hepatocellular & cholestatic, acute  Noted 5/17. Likely in setting of hepatic congestion. Resolved.  - Diuresis as above     # GERD  - Continue pta famotidine 20mg bid      # BPH  - Continue pta tamsulosin 0.4mg daily       Diet:  2g Na diet, 2L fluid restriction  DVT Prophylaxis: Enoxaparin  (Lovenox) subcutaneous   Fitzgerald Catheter: Not present  Code Status:   FULL        Laila España PA-C  Advanced Heart Failure/Cardiology II Service  Pager [phone] [phone]      I have seen and examined the patient as part of a shared visit with RUI Oropeza.  I agree with the note above. I reviewed today's vital signs and medications. I have reviewed and discussed with the advanced practice provider their physical examination, assessment, and plan   Briefly, patent with severe ischemic cardiomyopathy with LAD and RCA disease but minimal viable in LAD distribution thus decision made to pursue PCI and evaluate for advanced therapies if heart failure does not improve after revascularization  My key history/exam findings are: hemodynamically stable. Grossly euvolemic on exam.  Reports feeling well  The key management decisions made by me: agree with lisinopril 2.5mg bid.  To undergo pre-LVAD education and neuropsych testing today.  Given patient's elevated PVR on RHC yesterday will need CPX and repeat RHC in 2-3 months to reassess advanced therapies candidacy but hopefully hemodynamics will improve given revascularization and ongoing heart failure medical therapy.  If remains stable overnight, plan for discharge tomorrow.    Rosaline Mendez MD  Section Head - Advanced Heart Failure, Transplantation and Mechanical Circulatory Support  Director - Adult Congenital and Cardiovascular Genetics Center  Associate Professor of Medicine, [facility]    I spent a total of 20 minutes today in chart review as well as personally reviewing recent cardiac testing and/or laboratory results, today's history and examination, and discussion and counseling with the patient/family and documentation    ================================================================    Subjective/24-Hr Events:   Last 24 hr care team notes reviewed. Feeling well today. A bit of lightheadedness with quick position changes. Has  "been out walking in the hallways. No chest pain. No fevers or chills. No abd pain. No nausea. Tolerating PO intake. No swelling in his legs or stomach.     ROS:  4 point ROS including respiratory, CV, GI and  (other than that noted in the HPI) is negative.     Medications: Reviewed in EPIC.     Physical Exam:   /74 (BP Location: Right arm)   Pulse 96   Temp 97.6  F (36.4  C)   Resp 18   Ht 1.715 m (5' 7.5\")   Wt 65.5 kg (144 lb 4.8 oz)   SpO2 98%   BMI 22.27 kg/m      GENERAL: Appears comfortable, in no distress.  HEENT: Eye symmetrical, no discharge or icterus bilaterally. Mucous membranes moist and without lesions.  NECK: Supple, JVD ~7.   CV: RRR, +S1S2, systolic murmur, rub, or gallop.   RESPIRATORY: Respirations regular, even, and unlabored. Lungs CTA throughout.    GI: Soft and non distended with normoactive bowel sounds present in all quadrants. No tenderness, rebound, guarding.   EXTREMITIES: No peripheral edema. All extremities are warm and well perfused.  NEUROLOGIC: Alert and interacting approprietly.  No focal deficits.   MUSCULOSKELETAL: No joint swelling or tenderness.   SKIN: No jaundice. No rashes or lesions.     Labs:  CMP  Recent Labs   Lab 05/25/22  0637 05/24/22  1724 05/24/22  0810 05/23/22  1759 05/23/22  0614 05/22/22  1743 05/22/22  0712    135 139 134 138   < > 135   POTASSIUM 3.8 4.3 4.2 4.0 3.4   < > 4.4   CHLORIDE 101 103 107 103 106   < > 105   CO2 22 21 24 23 23   < > 19*   ANIONGAP 10 11 8 8 9   < > 11   GLC 84 87 83 88 85   < > 82   BUN 15 13 11 11 11   < > 16   CR 0.97 0.89 0.91 0.97 0.95   < > 0.94   GFRESTIMATED 89 >90 >90 89 >90   < > >90   ELDA 8.8 8.9 8.6 8.6 8.8   < > 9.5   MAG 2.2  --  2.2  --  2.0  --  2.3   PROTTOTAL 7.0 7.4 6.7* 6.6* 6.5*   < > 7.3   ALBUMIN 3.3* 3.5 3.2* 3.1* 3.1*   < > 3.4   BILITOTAL 0.6 1.8* 0.7 0.6 0.6   < > 0.5   ALKPHOS 74 83 74 69 67   < > 72   AST 34 31 16 18 15   < > 26   ALT 47 56 51 59 63   < > 86*    < > = values in this " interval not displayed.       CBC  Recent Labs   Lab 05/25/22  0637 05/24/22  1415 05/24/22  1412 05/24/22  0810 05/23/22  0614 05/22/22  0712   WBC 5.9  --   --  4.1 4.1 5.7   RBC 3.27*  --   --  3.06* 3.04* 3.47*   HGB 9.5* 9.2* 8.9* 8.8* 8.7* 9.8*   HCT 30.0*  --   --  28.6* 27.7* 32.7*   MCV 92  --   --  94 91 94   MCH 29.1  --   --  28.8 28.6 28.2   MCHC 31.7  --   --  30.8* 31.4* 30.0*   RDW 17.8*  --   --  18.3* 18.0* 18.7*     --   --  252 251 274       INR  Recent Labs   Lab 05/25/22  0637 05/24/22  0810 05/23/22  0614 05/22/22  0712   INR 1.21* 1.23* 1.19* 1.13       Time/Communication  I personally spent a total of 30 minutes. Of that >25 minutes was counseling/coordination of patient's care. Plan of care discussed with patient. See my note above for details.    Patient discussed with Dr. Mendez.

## 2022-05-25 NOTE — PROGRESS NOTES
Pre-VAD/Tx Social Work Services Progress Note      Date of Initial Social Work Evaluation: 5/18/2022  Collaborated with: Chauncey Dorman and DtrNoris virtually    Data: Writer saw patient last week for psychosocial assessment as part of VAD/Tx workup. Was unable to talk with Son and Dtr last week. They came over the weekend and were able to talk with the Doctors about their Dad's condition and surgical options. He had a stent placed yesterday and is preparing to discharge home tomorrow. Chauncey is here in person and DtrNoris is at home, but available virtually. Neuropsych eval done this morning and VAD show and tell is currently being done right now.  Intervention: Was able to meet with patient, hCauncey, and Dtr on video right before VAD show and Tell. Introduced writer and SW role on the Team. Provided written information on local lodging and accommodations options and provided education on required caregiver support (local for 30 days 24/7) post-discharge. Also provided information on support groups.  Assessment: Patient verbalized understanding, as did his son and dtr. They report that they should be able to make a caregiver plan work between the two of them, but report their biggest worry is over their Dad's health insurance situation. Apparently, he does not have eligibility for STD or LTD and is only eligible for FMLA. After FMLA, will lose his job, as well as his health insurance unless they pay for COBRA. Family wondering about health insurance options.  Call placed to Cindy Ramos with transplant finance. Asked to provide the family with information on [employer] health plans and SHIP (application process to apply for state health insurance plans) Unsure if patient would qualify for State Medicaid (most likely wouldn't after approval for SSDI)  Education provided by SW: SW role on VAD/TX team, need for caregiver support plan post-discharge, accommodations options, availability of support groups, and  insurance options.  Plan:    Discharge Plans in Progress: Home tomorrow if medically stable    Barriers to d/c plan: None    Follow up Plan: SW will remain available to consult on VAD or Transplant issues/questions as part of workup.

## 2022-05-25 NOTE — DISCHARGE SUMMARY
Former[facility]   Cardiology II Service / Advanced Heart Failure  Discharge Summary     Dandy Sands MRN# [id number]   YOB: 1960 Age: 61 year old     DATE OF ADMISSION: 5/16/2022  DATE OF DISCHARGE: 5/25/2022  ADMITTING PROVIDER: Kelly Lora MD  DISCHARGE PROVIDER: Dr. Rosaline Mendez and KERA OropezaC   PRIMARY PROVIDER:  No Ref-Primary, Physician    ADMIT DIAGNOSES:   # Acute on chronic systolic heart failure/HFrEF secondary to ICM (LVEF 10-20%)  Stage C. NYHA Class III.   # CAD s/p PCI to LAD (2005) Severe ostial LAD disease with in-stent restenosis of mid LAD at diag bifurcation, severe proximal RCA disease  # Severe Functional MR  # Dilated LV (LVIDd 6.1 cm)  # Hx of MI (2007)  # S/p CRT-D (Medtronic 2006, gen change 2012)  # SVT  # Nonsustained VT  # SLE  # APL  # Hx of DVT and PE  # Anemia, chronic  # High risk for SUHAIL  # Severe malnutrition in the context of chronic illness  # Anxiety due to medical condition  # Insomnia  # Mild-Moderate Emphysema  # Hx of COVID-19  # Liver injury, mixed hepatocellular & cholestatic, acute  # GERD  # BPH    DISCHARGE DIAGNOSES:   # Acute on chronic systolic heart failure/HFrEF secondary to ICM (LVEF 10-20%)  Stage C. NYHA Class III.   # CAD s/p PCI to LAD (2005) Severe ostial LAD disease with in-stent restenosis of mid LAD at diag bifurcation, severe proximal RCA disease, mild circ disease now s/p ostial LAD and proximal RCA lithotripsy and stenting on 5/24/22  # Severe Functional MR  # Dilated LV (LVIDd 6.1 cm)  # Hx of MI (2007)  # S/p CRT-D (Medtronic 2006, gen change 2012)  # SVT  # Nonsustained VT  # SLE  # APL  # Hx of DVT and PE  # Anemia, chronic  # High risk for SUHAIL  # Severe malnutrition in the context of chronic illness  # Anxiety due to medical condition  # Insomnia  # Mild-Moderate Emphysema  # Hx of COVID-19  # Liver injury, mixed hepatocellular & cholestatic, acute  # GERD  # BPH    FOLLOW-UP:  [] INR tomorrow  [] PCP  in 1-2 weeks  [] [facility] in about 2 weeks (they will arrange)  [] Dr. Stewart on June 16th in [place]  [] PFTs as outpatient  [] Needs repeat RHC in 2-3 months to assess PVR (elevated at 5.4)    PENDING RESULTS:   [] Palliative care note- ordered as advanced therapy evaluation, no acute concerns.  [] Immunocompatibility (last transfusion, ABO, HLA tissue typing, PRA): in process    HPI: Please see the detailed H & P by Tony Lora and Dr. Can from 5/16/2022. Briefly, Dandy Sands is a 61 year old male with history of CAD s/p remote PCI to LAD, ICM LVEF 30% s/p CRT-D, severe MR, APL with hx of DVT/PE on chronic anticoagulation with warfarin, hx of COVID-19 1/2022 (was hospitalized, not intubated), HTN, and HLD who presented to [facility] as a transfer from [facility] in [place] for evaluation of multivessel coronary artery disease and moderate-severe functional mitral regurgitation.     Regarding his cardiac history, underwent LAD stenting first in 2005, STEMI in 2007.  This was complicated by ischemic cardiomyopathy and eventually had a CRT-D (Medtronic 2006, gen change 2012) placed for a left bundle branch block and persistently low LVEF despite medical therapy. He has had various degrees of mitral regurgitation which has been managed medically for quite some time. Recently he was admitted after having COVID-19 for respiratory distress. It was unclear whether this initially was  related to pneumonia or decompensated heart failure. After a prolonged hospitalization and extensive testing it was felt that this was related to decompensated heart failure with progression of his MR. Due to persistent hypotension he was taken off most of his GDMT. He was seen HF who felt that the MR was the primary  of his decompensation.  He was eventually discharged with referral for mitraclip eval.       Per report he had evaluation on 5/9/22.  C showed severe ostial LAD disease with in-stent  restenosis of the mid LAD involving a diagonal bifurcation, mild LCx disease, and severe proximal RCA disease. LVEDP was 25.  TAVARES with anesthesia showed LVEF 30-35%, severely dilated LA, moderate to severe functional MR with multiple jets and apically tethered leaflets (he was hypotensive during the procedure SBP 70s). Due to his age, low LVEF, and multivessel CAD he was admitted that day for surgical evaluation.  CTS at [facility] felt he would benefit from CABG + MVR but that he should be at a center with ECMO/VAD back up.       Otherwise at the OSH, he was diursed about 3kg and transitioned to to oral lasix.  He was taken off warfarin and started on Lovenox as a bridge, and also Plavix was held in anticipation of surgery.  Hospital course was otherwise complicated by epistaxis requiring cautery by ENT, and a headache (CTH 5/14 negative per report, neuro felt to be migraines).    HOSPITAL COURSE:      # Acute on chronic systolic heart failure/HFrEF secondary to ICM (LVEF 10-20%)    Stage D. NYHA Class III.   # Severe Functional MR  # Dilated LV (LVIDd 6.1 cm)  Transferred from [facility] [place] 5/16/2022 for evaluation for CABG + MVR at a center with ECMO/VAD as a back up. However at the OSH, his EF was reported 30-35%, and EF here is 10-20% (on TTE and CMRI) with severely dilated LV. CMRI showing infarcted myocardium in LAD territory. Given the degree of LV dysfunction/dilation, moderate to severe functional MR and lack of viable myocardium, cardiac surgery would be high risk. Opted instead for RCA and LAD stenting and medical management (see below). Medical therapy was optimized as outlined below prior to discharge and plan is to monitor any improvement after revascularization with hopes of increasing medical therapy as outpatient. However, given his advanced disease, there is a possibility that he will need  Advanced therapies. This evaluation was mostly completed while inpatient, but he has not yet been  presented, nor approved.    On discharge he had systolic blood pressures in the 90s with very rare values in the 80s with recovery to the 90s without intervention. CASEY after about 200 feet, overall stable/improving. No lightheadedness. Tolerating PO intake. Brief 10 seconds of nausea, otherwise no nausea or vomiting. Extensive education provided regarding low-flow concerns. He will establish with [facility] in the next 1-2 weeks for local monitoring (patient not willing to return to [place] at this time) and establish outpatient care with Dr. Stewart in June.     Fluid status: euvolemic, continue lasix 40 mg PO BID  ACEi/ARB/ARNI/afterload reduction:lisinopril 2.5 mg BID  BB: Holding in the setting of cardiogenic shock  Aldosterone antagonist: deferred while other medical therapy is prioritized  SGLT2i: deferred while other medical therapy is prioritized  SCD prophylaxis: CRT-D  Other: Will need repeat RHC in 2-3 months to assess elevated PVR     LVAD/Transplant Evaluation Checklist  [x]???? labs (CBC, CMP, PT/INR, cystatin C, prealbumin, UA + micro)  [x]???? Infectious (Hep A/B/C, HIV, treponema, HSV 1/2 IgG, CMV IgG, Toxo IgG, EBV IgG, varicella IgG, Quant gold, COVID vaccine/PCR)  [x]???? Utox/nicotine and cotinine/PeTH   []???? Immunocompatibility (last transfusion, ABO, HLA tissue typing, PRA): in process  [x]???? ECG, Echo  []???? CPX - can be oupatient  []???? 6MWT - can be outpatient  [x]???? RHC, completed but needs repeat in 2-3 months to assess PVR  [x]???? CVTS consult  [x]???? Social work consult  [x]????  Palliative care consult: final note pending  [x]???? Neuropsych consult: order placed: final note pending  [x]???? Nutrition consult  [x]???? CT Dental   [x]???? Dental consult  [x]???? Abd US + doppler  - Abd US complete done, not with doppler; OK given unremarkable CT CAP  [x]???? Extremity US and ABIs  [x]???? Carotid US (if DM or ICM or >51yo)  []???? PFTs: outpatient  []???? Dexa:  outpatient  [x]???? CT head non-contrast  [x]???? CT CAP non-contrast  [x]???? Colonoscopy (>51 yo)  [x]???? PSA/HCG     # CAD s/p PCI to LAD (2005)  # Hx of MI (2007)  # Severe ostial LAD disease with in-stent restenosis of mid LAD at diag bifurcation, severe proximal RCA disease, mild circ disease now s/p ostial LAD and proximal RCA lithotripsy and stenting on 5/24/22  # S/p CRT-D (Medtronic 2006, gen change 2012)  As above, patient was transferred for consideration of CABG. CMRI showing infarcted myocardium in LAD territory. Given the degree of LV dysfunction/dilation, moderate to severe functional MR and lack of viable myocardium, cardiac surgery would be high risk. Now s/p ostial LAD and proximal RCA lithotripsy and stenting on 5/24/22.   - Brilinta and Coumadin (Lovenox bridge to therapeutic INR). No ASA given his need for Coumadin.  - Not on BB d/t low output      # SLE  # APL  # Hx of DVT and PE  He reports that his main symptom of lupus is diffuse joint pains that is tolerable with Tylenol. Coumadin was held for procedures, he was bridged with lovenox. On discharge, he will continue his lovenox bridge until his INR is therapeutic. PCP will continue to manage his coumadin until   - Continue pta hydroxychloroquine 200mg bid   - PTA mycophenolic acid 1500mg q12h  -- Would plan to hold MMF one week prior to surgery and re-starting 5-7 days after surgery in the absence of surgical site infection, poor wound healing or infection elsewhere.     # SVT  # Nonsustained VT  SVT noted 5/21 with rates up to 165bpm self terminating after up to 40 secs, 3 episodes at 4pm, hemodynamically stable. Has continued to have very rare NSVT, but overall minimal.      # Anemia, chronic  - Continue pta ferrous sulfate 325mg daily      # High risk for SUHAIL  Pt had plan for outpatient sleep study, missed due to this hospitalization     # Severe malnutrition in the context of chronic illness  Supplements to help maintain nutrition.  "    # Anxiety due to medical condition  # Insomnia  - PTA lorazepam 0.5-1 mg q6h PRN (pta q4h prn)  - PTA zolpidem 5mg at bedtime prn  - Hydroxyzine prn for anxiety  - Melatonin scheduled   - Psych consulted: cymbalta 30mg; continue anxiety PRNs while in the hospital     # Mild-Moderate Emphysema  # Hx of COVID-19  Had COVID ~1/2022, was hospitalized for ~6 days, was on BiPap intermittently, was not intubated. Currently no issues. Former smoker 2 ppd for 40 years quit on 09/09/09.      # Liver injury, mixed hepatocellular & cholestatic, acute  Noted 5/17. Likely in setting of hepatic congestion. Resolved.  - Diuresis as above     # GERD  - Continue pta famotidine 20mg bid      # BPH  - Continue pta tamsulosin 0.4mg daily     Laila España Pa-C  Advanced Heart Failure/Cardiology 2 Nurse Practitioner  5/26/2022  Pager: [phone]    PHYSICAL EXAM:  Blood pressure 108/74, pulse 96, temperature 97.6  F (36.4  C), resp. rate 18, height 1.715 m (5' 7.5\"), weight 65.5 kg (144 lb 4.8 oz), SpO2 98 %.    GENERAL: Appears comfortable, in no distress. Good energy. Moving easily around the room  HEENT: Eye symmetrical and without discharge or icterus bilaterally. Mucous membranes moist and without lesions.  NECK: Supple, JVD just above the clavical, ~6-7.   CV: RRR, +S1S2, no murmur, rub, or gallop.   RESPIRATORY: Respirations regular, even, and unlabored. Lungs CTA throughout.   GI: Soft and non distended with normoactive bowel sounds present in all quadrants. No tenderness, rebound, guarding.   EXTREMITIES: No peripheral edema. All extremities are warm and well perfused  NEUROLOGIC: Alert and interacting appropriatly. No focal deficits.   MUSCULOSKELETAL: No joint swelling or tenderness.   SKIN: No jaundice. No rashes or lesions.     LABS:   Last CBC:   Recent Labs   Lab Test 05/25/22  0637   WBC 5.9   RBC 3.27*   HGB 9.5*   HCT 30.0*   MCV 92   MCH 29.1   MCHC 31.7   RDW 17.8*          Last CMP:  Recent Labs "   Lab Test 05/25/22  0637      POTASSIUM 3.8   CHLORIDE 101   ELDA 8.8   CO2 22   BUN 15   CR 0.97   GLC 84   AST 34   ALT 47   BILITOTAL 0.6   ALBUMIN 3.3*   PROTTOTAL 7.0   ALKPHOS 74       IMAGING:  Results for orders placed or performed during the hospital encounter of 05/16/22   XR Chest Port 1 View    Narrative    EXAM: XR CHEST PORT 1 VIEW  5/17/2022 8:32 AM     HISTORY:  heart failure       COMPARISON:  CT chest and Chest radiograph 5/16/2022    FINDINGS:     Portable upright view of the chest. Left chest wall cardiac pacemaker  with leads in stable position. Stable cardiomegaly. Unchanged upper  lobe predominant reticular opacities and bibasilar opacities. No acute  airspace disease.    No acute osseous abnormality. Visualized upper abdomen is  unremarkable.        Impression    IMPRESSION:   1. No acute airspace disease. Interstitial/reticular changes again  noted.  2. Cardiomegaly.    I have personally reviewed the examination and initial interpretation  and I agree with the findings.    GABRIEL COURTNEY MD         SYSTEM ID:  [id number]   US Carotid Bilateral    Narrative    Exam: Bilateral carotid duplex Doppler ultrasound dated 5/16/2022 7:15  PM    Clinical history: pre-op cabg    Comparison Study: None    Ordering provider: Grant Gomez    Technique: Grayscale (B-mode) and duplex and spectral Doppler  ultrasound of the extracranial internal carotid, external carotid,  vertebral artery origins, right brachiocephalic/subclavian and left  subclavian arteries. Velocity measurements obtained with angle  correction at or less than 60 degrees.    Findings:    Right side:     Plaque Morphology: Predominantly echogenic, Smooth       Proximal CCA: 67/20 cm/sec     Mid CCA: 56/15 cm/sec     Distal CCA: 55/18 cm/sec     Carotid bifurcation: 70/23 cm/sec     External CA: 68/13 cm/sec       Proximal ICA: 50/18 cm/sec     Mid ICA: 51/26 cm/sec     Distal ICA: 65/22 cm/sec       Vertebral artery: 38/12  cm/sec, antegrade       ICA/CCA ratio: 1.2    Left side:     Plaque Morphology: None       Proximal CCA: 64/18 cm/sec     Mid CCA: 83/27 cm/sec     Distal CCA: 63/21 cm/sec     Carotid bifurcation: 75/23 cm/sec     External CA: 87/16 cm/sec       Proximal ICA: 61/22 cm/sec     Mid ICA: 62/27 cm/sec     Distal ICA: 43/15 cm/sec       Vertebral artery: 43/22 cm/sec , antegrade       ICA/CCA ratio: 1.0      Impression    Impression:    1. Right side:        Degree of stenosis of the internal carotid artery: Less than 50 %.  .    2. Left side:         Degree of stenosis of the internal carotid artery: Normal. .    [employer] Accreditation Commission Updated Recommendations for  Carotid Stenosis Interpretation Criteria - October 2021.  https://[email or web address].org/wp-content/[email or web address]/10/IAC-Vascular-Test  [email or web address]-Ryyzzrmmfcbangy-for-Carotid-Stenosis-Interpre  ation-Criteria.[email or web address]         Normal            ICA PSV: < 180 cm/s            Plaque Estimate: None            ICA/CCA PSV Ratio: < 2.0            ICA EDV: < 40 cm/s         < 50%            ICA PSV: < 180 cm/s            Plaque Estimate: < 50%            ICA/CCA PSV Ratio: < 2.0            ICA EDV: < 40 cm/s         50 - 69%            ICA PSV: < 180 - 230 cm/s            Plaque Estimate: > 50%            ICA/CCA PSV Ratio: 2.0 - 4.0            ICA EDV: 40 - 100 cm/s         > 70% but less than near occlusion            ICA PSV: > 230 cm/s            Plaque Estimate: > 50%            ICA/CCA PSV Ratio: > 4.0            ICA EDV: > 100 cm/s         Total occlusion            ICA PSV: Undetectable            Plaque Estimate: Visible, no detectable lumen            ICA/CCA PSV Ratio: N/A            ICA EDV: N/A    I have personally reviewed the examination and initial interpretation  and I agree with the findings.    ARSEN ALEJO MD         SYSTEM ID:  [id number]   CT Chest w/o Contrast    Narrative    CT chest without contrast    INDICATION:  Preoperative CABG    COMPARISON: None    FINDINGS: The included thyroid appears normal. Implantable cardiac  defibrillator from left subclavian transvenous approach. Multivessel  coronary artery calcium. Calcifications in the left ventricle wall.  Left atrial enlargement. No pleural or pericardial effusion. IVC  filter. Abdominal aortic calcifications. No adenopathy in the chest.  Bullous disease in the lung apices with mild to moderate emphysematous  changes in the lungs. No dominant pulmonary nodule. Bony structures  appear grossly unremarkable. There are degenerative changes in the  thoracic spine and lower cervical spine.      Impression    IMPRESSION: Implantable cardiac defibrillator. Coronary artery  calcium. Aortic calcifications. Left atrial mild enlargement. Mild to  moderate emphysema. Calcification in the left ventricle wall.    CONSTANTINO OWEN MD         SYSTEM ID:  [id number]   XR Chest 2 Views    Narrative    Exam: XR CHEST 2 VW, 5/16/2022 6:17 PM    Indication: pre-op cabg    Comparison: Same-day CT    Findings:   Left chest wall implantable cardiac defibrillator leads project over  the right ventricular apex, right atrial appendage, and coronary  sinus. Enlarged cardiac silhouette. Coronary artery stents. The  pulmonary vasculature is within normal limits. Bilateral peripheral  reticular opacities and emphysematous changes are better seen on  comparison CT. No pleural effusion or pneumothorax. Partially  visualized IVC filter.      Impression    Impression: No acute cardiopulmonary abnormality. Please refer to same  day chest CT for further details.    YESENIA KEITH DO         SYSTEM ID:  [id number]    Lower Extremity Venous Mapping Bilateral    Narrative    Exam: Ultrasound Doppler vein mapping of bilateral lower extremities  dated  5/16/2022 7:18 PM    Comparison study: None    Clinical history: Preop CABG    Ordering clinician: Grant Gomez    Technique: Grayscale (B-mode) with duplex Doppler  ultrasound, along  with compression and augmentation, of the bilateral great saphenous  veins.    RIGHT LEG:    CFV: Thrombus: No    GSV:  Great saphenous and adjacent common femoral vein are patent with  antegrade phasic flow.  Proximal thigh: Thrombus: No, Wall thickness: No, 4.1 mm  Mid thigh: Thrombus: No, Wall thickness: No, 2.5 mm  Distal thigh: Thrombus: No, Wall thickness: No, 2.3 mm  Knee: Thrombus: No, Wall thickness: No, 2.6 mm  Superior calf: Thrombus: No, Wall thickness: No, 2.3 mm  Mid calf: Thrombus: No, Wall thickness: No, 2.2 mm  Distal calf: Thrombus: No, Wall thickness: No, 2.1 mm    LEFT LEG:    CFV: Thrombus: No    GSV:  Great saphenous and adjacent common femoral vein are patent with  antegrade phasic flow.  Proximal thigh: Thrombus: No, Wall thickness: No, 7.2 mm at  saphenofemoral junction, 4.8 mm at groin/proximal thigh  Mid thigh: Thrombus: No, Wall thickness: No, 3.0 mm  Distal thigh: Thrombus: No, Wall thickness: No, 2.4 mm  Knee: Thrombus: No, Wall thickness: No, 3.3 mm  Superior calf: Thrombus: No, Wall thickness: No, 2.4 mm  Mid calf: Thrombus: No, Wall thickness: No, 1.7 mm  Distal calf: Thrombus: No, Wall thickness: No, 1.7 mm        Impression    Impression:   Bilateral great saphenous veins are patent with measurements as above.    I have personally reviewed the examination and initial interpretation  and I agree with the findings.    ARSEN ALEJO MD         SYSTEM ID:  [id number]   CT Dental wo Contrast    Narrative    CT DENTAL WO CONTRAST 5/17/2022 11:38 AM    History:  IP Consult for pre-LVAD dental evaluation    Comparison:  None      TECHNIQUE: 3-D reconstruction by the technologists, with curved  multiplanar reformat of thin section imaging through the mandible and  maxilla obtained without intravenous contrast.    FINDINGS:  No significant soft tissue swelling or mass. Normal facial bone  alignment. No bony erosion.     Visualized portions of the paranasal sinuses  demonstrate polypoid  mucosal thickening of the left maxillary sinus.. Minimal to mild  mucosal thickening in the ethmoid air cells, frontal sinus, and right  maxillary sinus. Normal temporomandibular joints. Nasal septal  perforation.      Impression    IMPRESSION:   1. Suspect dental caries along the distal surface of the right  mandibular canine, although this could be artifactual. No periapical  abscess.  2. Paranasal sinus mucosal thickening without air-fluid levels to  suggest acute sinusitis.    I have personally reviewed the examination and initial interpretation  and I agree with the findings.    SHAUN OCHOA MD         SYSTEM ID:  [id number]   XR Chest Port 1 View    Narrative    EXAM: XR CHEST PORT 1 VIEW  5/18/2022 4:24 PM     HISTORY:  Rosemont placement       COMPARISON:  CT chest and Chest radiograph 5/16/2022    FINDINGS:   Portable upright view of the chest. Interval placement of Rosemont-Janae  catheter with tip terminating over the right main pulmonary artery.  Otherwise, stable exam. Implantable cardiac defibrillator.      Impression    IMPRESSION: Rosemont-Janae catheter terminating over the right main  pulmonary artery.    I have personally reviewed the examination and initial interpretation  and I agree with the findings.    CONSTANTINO OWEN MD         SYSTEM ID:  [id number]   XR Chest Port 1 View    Narrative    EXAMINATION:  XR CHEST PORT 1 VIEW 5/19/2022 12:47 AM.    COMPARISON: 5/18/2022    HISTORY:  Rosemont Placement    FINDINGS: Frontal view. Rosemont-Janae catheter tip projects over the  central right pulmonary artery. Unchanged pacemaker. Cardiac  silhouette unchanged. No large pleural effusion or pneumothorax.  Decreased patchy bilateral pulmonary opacities.      Impression    IMPRESSION: Rosemont-Janae catheter tip projects over the central right  pulmonary artery.    I have personally reviewed the examination and initial interpretation  and I agree with the findings.    RAHEEM MEAD MD         SYSTEM ID:   [id number]    Lower Extremity Arterial Duplex Bilateral    Narrative    Exam: Duplex ultrasound of bilateral lower extremity arteries dated  5/20/2022 9:03 AM     Clinical information: Heart Transplant Candidate Evaluation. Assess  vasculopathy     Comparison: None    Technique: Grayscale (B-mode), color Doppler, and duplex spectral  Doppler ultrasound of the lower extremity arteries. Velocity  measurements obtained with angle correction of 60 degrees or less.    Ordering provider: Chris Lawrence MD    Findings:     Right lower extremity:     Common femoral artery: Velocity: 111 cm/sec. Waveforms: Triphasic  Profunda femoral artery: Velocity: 110 cm/sec. Waveforms: Triphasic  Proximal SFA: Velocity: 103 cm/sec. Waveforms: Triphasic  Mid SFA:Velocity:  75 cm/sec. Waveforms: Triphasic  Distal SFA: Velocity: 48 cm/sec. Waveforms: Triphasic    Popliteal artery: Velocity: 47 cm/sec. Waveforms: Triphasic    PTA ankle: Velocity: 102 cm/sec. Waveforms: Triphasic  ASHLEY ankle: Velocity: 58 cm/sec. Waveforms: Triphasic    Left lower extremity:    Common femoral artery: Velocity: 86 cm/sec. Waveforms: Triphasic  Deep femoral artery: Velocity: 94 cm/sec. Waveforms: Triphasic  Proximal SFA: Velocity: 75 cm/sec. Waveforms: Triphasic  Mid SFA: Velocity: 89 cm/sec. Waveforms: Triphasic  Distal SFA: Velocity: 45 cm/sec. Waveforms: Triphasic    Popliteal artery: Velocity: 45 cm/sec. Waveforms: Triphasic    PTA ankle: Velocity: 77 cm/sec. Waveforms: Triphasic  ASHLEY ankle: Velocity: 147 cm/sec. Waveforms: Triphasic      Impression    Impression:     1. Right leg: Patent right lower extremity arterial vasculature  without focal hemodynamically significant stenosis.    2. Left leg: Patent left lower extremity arterial vasculature.   Potential stenosis in the left ASHLEY with elevated velocity ratio  greater than 3 (compared to the popliteal artery).    Guidelines:    [facility] duplex criteria for lower limb arterial  occlusive  disease    Percent stenosis:     Normal (1-19%): Peak systolic velocity (cm/s): <150, End-diastolic  velocity (cm/s): <40, Velocity ratio (Vr): <1.5, Distal arterial  waveform: Triphasic    20-49%: Peak systolic velocity (cm/s): 150-200, End-diastolic velocity  (cm/s): <40, Velocity ratio (Vr): 1.5-2.0, Distal arterial waveform:  Triphasic    50-75%: Peak systolic velocity (cm/s): 200-300, End-diastolic velocity  (cm/s): <90, Velocity ratio (Vr): 2.0-3.9, Distal arterial waveform:  Poststenotic turbulence distal to stenosis, monophasic distal waveform    >75%: Peak systolic velocity (cm/s): >300, End-diastolic velocity  (cm/s): <90, Velocity ratio (Vr): >4.0, Distal arterial waveform:  Dampened distal waveform and low PSV/EDV* in the stenosis    Occlusion: Absent flow by color Doppler/pulsed Doppler spectral  analysis; length of occlusion estimated from distance between exit and  reentry collateral arteries    *PSV = peak systolic velocity, EDV = end-diastolic velocity  http://link.galindo.com/chapter/10.1007/[email or web address]_23/fulltext  html    I have personally reviewed the examination and initial interpretation  and I agree with the findings.    ASAF NAZARIO MD         SYSTEM ID:  [id number]   X-ray Chest 2 vws*    Narrative    Chest 2 views    INDICATIONS: Heart transplant candidate evaluation    COMPARISON: Single portable view earlier today    FINDINGS: Heart size appears normal. Pacer/implantable cardiac  defibrillator from left subclavian transvenous approach. Right IJ  [place]-Janae catheter tip in the right main branch pulmonary artery.  Patchy opacities in the lungs may indicate mild atelectasis subtle  edema. Bony structures appear grossly unremarkable.      Impression    IMPRESSION: Pacer/implantable cardiac defibrillator. [place]-Janae  catheter tip in right main branch pulmonary artery. Mild atelectasis  or edema in the lungs.    CONSTANTINO OWEN MD         SYSTEM ID:  [id number]   US JOE Doppler No  Exercise    Narrative    Resting JOE 5/20/2022 12:48 PM    CLINICAL HISTORY: Heart transplant evaluation and/or Ventricular  Assist Device (VAD) planning.    COMPARISONS: Axial duplex 5/20/2022    REFERRING PROVIDER: Chris Lawrence    TECHNIQUE: Bilateral JOE and VPR obtained.    FINDINGS:  RIGHT:       Brachial: 91 mmHg       Ankle (PT): 119 mmHg       Ankle (DP): 126 mmHg         JOE: 1.27    LEFT:       Brachial: 99 mmHg       Ankle (PT): 124 mmHg       Ankle (DP): 125 mmHg         JOE: 1.26      Impression    IMPRESSION:  1. RIGHT:       A. Resting JOE is 1.27, normal.    2. LEFT:       A. Resting JOE is 1.26, normal.    Guidelines:    JOE Diagnostic Criteria (Based on criteria published in Circulation  2011; 124: 1921-7749):    > 1.4: Non compressible    1.00 - 1.40: Normal    0.91 - 0.99: Borderline    At or below 0.90: Abnormal    JOE Diagnostic Criteria (Based on ACC/AHA guideline 2008):    >/=1.3 - non compressible vessels    1.00  -1.29 - Normal    0.91 - 0.99 - Borderline    0.41 - 0.90 - Mild to moderate PAD    0.00 - 0.40 - Severe PAD    I have personally reviewed the examination and initial interpretation  and I agree with the findings.    ASAF NAZARIO MD         SYSTEM ID:  [id number]   CT Chest Abdomen Pelvis w/o Contrast    Narrative    EXAMINATION: CT CHEST ABDOMEN PELVIS W/O CONTRAST, 5/19/2022 6:27 PM    TECHNIQUE: Helical CT images from the thoracic inlet through the  symphysis pubis were obtained without intravenous contrast. Contrast  dose:    COMPARISON: Chest x-ray 5/19/2022, CT chest 5/16/2022    HISTORY: Heart Transplant Candidate Evaluation    FINDINGS:  Support devices: Left chest wall cardiac defibrillator leads in the  right ET tube, right ventricle, and coronary sinus. Right IJ central  Transfer-Janae catheter with tip in the proximal right pulmonary artery.    Chest: The central tracheobronchial tree is patent. Similar biapical  paraseptal emphysematous changes. No suspicious  pulmonary nodules. No  focal consolidation or pleural effusion. No pneumothorax. Main  pulmonary artery mildly enlarged at 3.8 cm. No discrete right heart  enlargement.    The partially visualized thyroid is unremarkable. No abnormally  enlarged axillary, mediastinal, or hilar lymph nodes by size criteria.  The esophagus is unremarkable.    Abdomen and pelvis: Unremarkable noncontrast appearance of the liver,  gallbladder, pancreas, spleen, adrenal glands, and kidneys. Small  calcified focus within the superior aspect of the spleen, presumed  granulomatous. The urinary bladder is incompletely distended. The  prostate is enlarged.    No abnormally dilated loops of small and large bowel. The appendix is  surgically absent. No intra-abdominal free air or free fluid. No  lymphadenopathy by size criteria.    The patency of the intra-abdominal vasculature cannot be assessed on  this noncontrast exam. Infrarenal IVC filter noted.    Bones and soft tissues: No suspicious osseous lesions.      Impression    IMPRESSION:    No acute or suspicious findings in exam. Pulmonary artery enlargement  suggesting pulmonary artery hypertension. Chronic interstitial  subpleural fibrotic disease with paraseptal emphysematous change.    I have personally reviewed the examination and initial interpretation  and I agree with the findings.    CONSTANTINO OWEN MD         SYSTEM ID:  [id number]   US Upper Ext Arterial Duplex Bilateral    Narrative    Duplex Doppler ultrasound assessment of the upper extremities arteries  bilaterally 5/20/2022 9:03 AM    Clinical information: Preop evaluation prior to possible heart  transplant.    Ordering provider: Chris Lawrence    Technique: B-mode (grayscale) and duplex Doppler ultrasound of the  upper extremity arteries. Velocity measurements obtained with angle  correction at or less than 60 degrees.    Findings:     Multiphasic waveforms throughout. Alternating peak systolic velocity  amplitudes  consistent with known history of heart failure.    Peak systolic velocities:    Right Upper Extremity Arteries:     Subclavian proximal: 77 cm/sec, diameter is 0.7 cm  Subclavian mid: 61 cm/sec, diameter is 0.7 cm    Axillary artery: 87 cm/sec, diameter is 0.6 cm    Left Upper Extremity Arteries:    Subclavian proximal: 110 cm/sec, diameter is 0.7 cm  Subclavian mid: 111 cm/sec, diameter is 0.7 cm    Axillary artery: 78 cm/sec, diameter is 0.7 cm      Impression    Impression:    Patent bilateral upper extremity central arteries.    CANDIDA MOON         SYSTEM ID:  [id number]    Upper Extremity Venous Duplex Bilat    Narrative    EXAMINATION: DOPPLER VENOUS ULTRASOUND OF BILATERAL UPPER EXTREMITIES,  5/20/2022 9:04 AM     COMPARISON: None    HISTORY: Heart transplant evaluation and/or Ventricular Assist Device  (VAD) planning. Assess vascular access.     TECHNIQUE:  Gray-scale evaluation with compression, spectral flow, and  color Doppler assessment of the deep venous system of both upper  extremities.    FINDINGS:  Normal blood flow and waveforms are demonstrated in the internal  jugular, innominate, subclavian, and axillary veins bilaterally.      Impression    IMPRESSION:  No evidence of deep venous thrombosis in either upper extremity  centrally.    I have personally reviewed the examination and initial interpretation  and I agree with the findings.    YESENIA KEITH DO         SYSTEM ID:  [id number]   CT Head w/o Contrast    Narrative    CT HEAD W/O CONTRAST 5/19/2022 6:27 PM    History: Heart Transplant Candidate Evaluation   ICD-10:    Comparison: None    Technique: Using multidetector thin collimation helical acquisition  technique, axial, coronal and sagittal CT images from the skull base  to the vertex were obtained without intravenous contrast.   (topogram) image(s) also obtained and reviewed.    Findings: Moderate generalized cerebral atrophy. There is no  intracranial hemorrhage, mass effect,  or midline shift. Gray/white  matter differentiation in both cerebral hemispheres is preserved.  Ventricles are proportionate to the cerebral sulci. The basal cisterns  are clear.    The bony calvaria and the bones of the skull base are normal. Mucosal  thickening of the left maxillary sinus. The remainder of the  visualized portions of the paranasal sinuses and mastoid air cells are  mostly clear.      Impression    Impression:  No acute intracranial pathology. Moderate cerebral atrophy.    I have personally reviewed the examination and initial interpretation  and I agree with the findings.    PHAM ALVAREZ MD         SYSTEM ID:  [id number]   XR Chest Port 1 View    Narrative    EXAMINATION:  XR CHEST PORT 1 VIEW 5/20/2022 1:10 AM.    COMPARISON: 5/19/2022    HISTORY:  [place]-Janae Catheter Positioning    FINDINGS: [place]-Janae catheter tip projects over the main pulmonary  artery. Pacemaker unchanged. Cardiac silhouette unchanged. No large  pleural effusion. No pneumothorax. No new focal pulmonary opacity.      Impression    IMPRESSION: [place]-Janae catheter tip projects over the main pulmonary  artery.    I have personally reviewed the examination and initial interpretation  and I agree with the findings.    CONSTANTINO OWEN MD         SYSTEM ID:  [id number]   US Abdomen Complete Portable    Narrative    EXAMINATION: US ABDOMEN COMPLETE,  5/20/2022 9:03 AM     COMPARISON: CT 5/19/2022    HISTORY: Heart transplant evaluation and/or Ventricular Assist Device  (VAD) planning     TECHNIQUE: The abdomen was scanned in standard fashion with  specialized ultrasound transducer(s) using both gray-scale and limited  color Doppler techniques.    FINDINGS:  Liver: The liver demonstrates normal homogeneous echotexture,  measuring 17.7 cm in craniocaudal dimension. No evidence of a focal  hepatic mass. The main portal vein is patent with antegrade flow,  measuring 1.1 cm in diameter.    Gallbladder:  There is no wall thickening,  pericholecystic fluid,  positive sonographic Prater's sign or evidence for cholelithiasis.    Bile Ducts: Both the intra- and extrahepatic biliary system are of  normal caliber.  The common bile duct measures 4 in diameter.    Pancreas: Visualized portions of the head and body of the pancreas are  unremarkable.     Kidneys: Both kidneys are of normal echotexture, without mass or  hydronephrosis. 7 mm anechoic focus in the lower pole of the right  kidney, compatible with a benign cyst. The craniocaudal dimensions  are: right- 10.5 cm, left- 12.5 cm.    Spleen: The spleen is mildly enlarged, measuring 13.9 in sagittal  dimension.    Aorta and IVC: The visualized portions of the aorta and IVC are  unremarkable. The mid aorta measures 1.9 cm and the distal aorta  measures 1.8 cm. The right common iliac artery measures 1 cm in the  left common iliac artery measures 0.9 cm. The proximal aorta measures  2.2 cm in diameter and the IVC measures 2.4 cm in diameter.    Fluid: Small right pleural effusion. No ascites.      Impression    IMPRESSION:   1. Mild hepatosplenomegaly.  2. Small right pleural effusion.    I have personally reviewed the examination and initial interpretation  and I agree with the findings.    YESEINA KEITH DO         SYSTEM ID:  [id number]   XR Chest Port 1 View    Narrative    EXAM:  XR CHEST PORT 1 VIEW    INDICATION: Inappropriate waveform on swan. Need to assess [place]  position    COMPARISON:  5/20/2022    FINDINGS:    Portable AP view the chest. [place]-Janae catheter terminating over the  right main pulmonary artery. Left upper chest ICD with stable lead  placement. Sharp angulation concerning for possible kinking involving  the [place]-Janae catheter    Stable cardiomediastinal silhouette. Bilateral perihilar opacities,  stable. No pneumothorax. No pleural effusion. Unremarkable upper  abdomen. No acute bony lesions.      Impression    IMPRESSION: No acute airspace opacity. [place]-Janae catheter  terminating  over the right main pulmonary artery. Sharp angulation concerning for  possible kinking involving the [place]-Janae catheter .    Finding discussed with Dr. Lawrence by Dr. Rios at 4:30 PM 2022     I have personally reviewed the examination and initial interpretation  and I agree with the findings.    JILL RIOS MD         SYSTEM ID:  [id number]   XR Chest Port 1 View    Narrative    EXAMINATION: XR CHEST PORT 1 VIEW, 2022 5:20 PM    INDICATION: [place] placement due to repositioning after abnormal  waveform    COMPARISON: Chest x-ray 2022    FINDINGS: 2 AP radiographs of the chest. Interval signs of right IJ  [place]-Janae catheter with tip terminating over the distal left pulmonary  artery versus central left lower lobe are pulmonary artery. Left chest  generator with ICD and pacer wires projecting over the right atrium,  right ventricle, and coronary sinus.    Trachea is midline. The cardiac mediastinal silhouette is stable. No  pleural effusion. No pneumothorax. Unchanged mixed interstitial and  airspace opacities.      Impression    IMPRESSION:  Interval placement of [place]-Janae catheter with tip terminating of the  distal left pulmonary artery versus central left lower lobar pulmonary  artery. Remainder of the exam is stable.    I have personally reviewed the examination and initial interpretation  and I agree with the findings.    YESENIA KEITH DO         SYSTEM ID:  [id number]   Echo Complete     Value    LVEF  10-15% (severely reduced)    Narrative    [id number]  [id number]  [id number]  [id number]^PILY^MARINA     [facility],[place]  Echocardiography Laboratory  [address] [place] [id number]     Name: OLEG KAUR  MRN: [id number]  : 1960  Study Date: 2022 10:12 AM  Age: 61 yrs  Gender: Male  Patient Location: [facility]  Reason For Study: Mitral Regurgitation  Ordering Physician: MARINA NASCIMENTO  Performed By: Nacho Alcazar RDCS     BSA: 1.8  m2  Height: 67 in  Weight: 147 lb  BP: 96/72 mmHg  ______________________________________________________________________________  Procedure  Echocardiogram with two-dimensional, color and spectral Doppler performed.  Contrast Optison. Patient was given 9 ml mixture of 3 ml Optison and 6 ml  saline.  ______________________________________________________________________________  Interpretation Summary  Severe left ventricular dilation is present. Left ventricular function is  decreased. The ejection fraction is 10-15% (severely reduced). Severe diffuse  hypokinesis is present.  Mild right ventricular dilation is present. Global right ventricular function  is mildly reduced.  Severe functional mitral insufficiency is present.  Pulmonary hypertension is present. Estimated pulmonary artery systolic  pressure is 44 mmHg plus right atrial pressure.  Dilation of the inferior vena cava is present with abnormal respiratory  variation in diameter. Mean RA pressure estiamted at 15 mmHg.  No pericardial effusion is present.  ______________________________________________________________________________  Left Ventricle  Severe left ventricular dilation is present. Left ventricular function is  decreased. The ejection fraction is 10-15% (severely reduced). Left  ventricular diastolic function is abnormal. Severe diffuse hypokinesis is  present. There is no thrombus.     Right Ventricle  Mild right ventricular dilation is present. Global right ventricular function  is mildly reduced. A pacemaker lead is noted in the right ventricle.     Atria  The right atria appears normal. Severe left atrial enlargement is present.     Mitral Valve  Severe mitral insufficiency is present. The cause of the mitral insufficiency  appears to be altered left ventricular size and geometry. Several features of  severe MR present - E velocity 1.6 m/s, systolic flow reversal in the LLPV,  EROA = 0.57 cm2, MR volume 58 mL.     Aortic Valve  The aortic  valve is tricuspid. Trace aortic insufficiency is present.     Tricuspid Valve  Mild tricuspid insufficiency is present. Estimated pulmonary artery systolic  pressure is 44 mmHg plus right atrial pressure. Pulmonary hypertension is  present.     Pulmonic Valve  Mild pulmonic insufficiency is present.     Vessels  The aorta root is normal. Dilation of the inferior vena cava is present with  abnormal respiratory variation in diameter. IVC diameter >2.1 cm collapsing  <50% with sniff suggests a high RA pressure estimated at 15 mmHg or greater.     Pericardium  No pericardial effusion is present.     Compared to Previous Study  There is no prior study for direct comparison.  ______________________________________________________________________________  MMode/2D Measurements & Calculations  IVSd: 0.72 cm  LVIDd: 6.1 cm  LVIDs: 5.4 cm  LVPWd: 1.0 cm  FS: 10.9 %  LV mass(C)d: 211.9 grams  LV mass(C)dI: 119.5 grams/m2  LA dimension: 5.4 cm  asc Aorta Diam: 3.1 cm  LVOT diam: 2.1 cm  LVOT area: 3.5 cm2  RWT: 0.34     Doppler Measurements & Calculations  MV E max abhilash: 149.7 cm/sec  MV dec slope: 1120 cm/sec2  MV dec time: 0.14 sec  Ao V2 max: 101.0 cm/sec  Ao max P.0 mmHg  Ao V2 mean: 76.0 cm/sec  Ao mean PG: 3.0 mmHg  Ao V2 VTI: 16.2 cm  OCTAVIA(I,D): 3.1 cm2  OCTAVIA(V,D): 2.4 cm2  LV V1 max P.0 mmHg  LV V1 max: 70.9 cm/sec  LV V1 VTI: 14.4 cm  SV(LVOT): 49.9 ml  SI(LVOT): 28.1 ml/m2     PA V2 max: 73.3 cm/sec  PA max P.1 mmHg  PI end-d abhilash: 149.0 cm/sec  TR max abhilash: 330.0 cm/sec  TR max P.6 mmHg  AV Abhilash Ratio (DI): 0.70  OCTAVIA Index (cm2/m2): 1.7  E/E' av.1  Lateral E/e': 16.9  Medial E/e': 45.2     ______________________________________________________________________________  Report approved by: Nakia aGines 2022 11:46 AM         Cardiac Catheterization    Narrative      Right sided filling pressures are normal.    Moderately elevated pulmonary artery hypertension.    Left sided filling pressures  are moderately elevated.    Reduced cardiac output level.     Ambulatory cardiogenic shock. Elevated wedge pressure with high V waves in   the setting of known MR.   Good response to nipride with improvement in cardiac index yet patient is   with borderline symptomatic hypotension at the time. Transfer to CCU with   leave in [place] for hemodynamic optimization.     Cardiac Catheterization    Narrative      Right sided filling pressures are mildly elevated.    Moderately elevated pulmonary artery hypertension.    Left sided filling pressures are moderately elevated.    Left ventricular filling pressures are severely elevated.    Normal cardiac output level.    Hemodynamic data has been modified in Epic per physician review.    Dist LAD lesion is 80% stenosed.    Prox LAD to Mid LAD lesion is 70% stenosed.    Ost LAD lesion is 90% stenosed.    Mid LM to Dist LM lesion is 40% stenosed.    Prox RCA lesion is 70% stenosed.     Two vessel coronary artery disease (90 ostial LAD, 80% in-stent restenosis   of pLAD to mLAD stent, 80% long, calcified stenosis of pRCA)  Successful PCI of ostial LAD to mLAD with a Synergy 3.00x32 mm TISHA.   Successful PCI of proximal RCA with a Synergy 3.00x38 mm TISHA.   Imaging with IVUS of LM, proximal LAD, proximal RCA.  Shockwave lithotripsy of pLAD (80 pulses) and pRCA (40 pulses).  Hemostasis of R femoral artery with Angioseal.       MRI Cardiac w/contrast    Narrative                                                     Critical access hospital                                                     CMR Report      MRN:                  [id number]                                  Name:             OLEG KAUR                                  :                  1960                                  Scan Date:   2022 12:02:46                                  Electronically signed by Madyson Galvin 2022-May-18 15:19:32    SUMMARY    ==========================================================================================================    Clinical history: 61 year old man with acute on chronic heart failure, known ischemic cardiomyopathy, non  conditional ICD with an abandoned LV lead.     Comparison CMR: none    Technically difficult study due to artifact from ICD and breathing    1. The left ventricle is severely dilated with apical wall thinning and akinesis. There is severe global  hypokinesis. The global systolic function is severely reduced. The LVEF is 20%.    2. The right ventricle is normal in cavity size. The global systolic function is moderately reduced. The  RVEF is 34%. ICD device seen in the right heart.     3. The right atrium is normal in size and the left atrium is severely enlarged.    4. There is atleast moderate functional mitral regurgitation. There is mild aortic, tricuspid and pulmonic  regurgitation.     5. There is transmural late gadolinium enhancement involving the mid anterior, mid anteroseptal, all apical  segments including the true apex consistent with prior myocardial infarction in the left anterior  descending artery territory.      6. There is no pericardial effusion.    7. There is no apical LV thrombus.     8. There are small bilateral pleural effusions.     CONCLUSIONS:   Ischemic cardiomyopathy with prior anteroseptal infarction.  Severely dilated left ventricle with severely reduced systolic function, LVEF 20%   Normal right ventricular size with moderately reduced systolic function, RVEF 34%.  Functional mitral regurgitation of atleast moderate severity.     CORE EXAM   ==========================================================================================================    MEASUREMENTS   ----------------------------------------------------------------------------------------      VOLUMETRIC ANALYSIS        ----------------------------------------------  .---------------------------------------------------------.                   LV    Reference   RV    Reference    +-----+-----------+------+------------+------+------------+   EDV  ml          313   (109-191)   181   (105-205)         ml/m^2      170   (60-95)      98   ()     ESV  ml          250   (27-72)     120   (20-80)           ml/m^2      136   (14-36)      65   (11-40)      CO   L/min      5.61              5.35                     L/min/m^2  3.05              2.91                SV   ml           64   ()     61   ()          ml/m^2       35   (40-64)      33   (37-69)      EF   %            20   (58-76)      34   (55-81)     '-----+-----------+------+------------+------+------------'            CARDIAC OUTPUT HR:  88 BPM      SCAN INFO   ==========================================================================================================    GENERAL   ----------------------------------------------------------------------------------------      CONTRAST AGENT       ----------------------------------------------          TYPE:  Gadavist          GD CONCENTRATION:  0.5 M          VOLUME ADMINISTERED:  10 ml          DOSAGE:  0.07 mmol/kg        VITALS       ----------------------------------------------          HEIGHT:  67.00 in          HEIGHT:  170.18 cm          WEIGHT:  159.99 lbs          WEIGHT:  72.57 kgs          BSA:  1.84 m^2        PULSE SEQUENCES       ----------------------------------------------          SSFP cine, 2D LGE segmented, 2D LGE single-shot, 3D LGE         SETUP       ----------------------------------------------          ATTENDING PHYSICIAN:  ADRIÁN CHAUDHARY   ----------------------------------------------------------------------------------------      CPT Codes      ICD10 Codes      Report generated by [employer], a product of  [employer]       PROCEDURES:  5/24/22 Coronary Angiogram  ostial LAD and proximal RCA lithotripsy and stenting on 5/24/22 5/24 [facility]  5/18 [facility]    CONSULTATIONS:   Palliative Care  Health Psych  Cardiothoracic surgery   Neuro Psychology  Social Work  Dental  Rheumatology    DISCHARGE MEDICATIONS:  Current Discharge Medication List      START taking these medications    Details   DULoxetine (CYMBALTA) 30 MG capsule Take 1 capsule (30 mg) by mouth daily  Qty: 30 capsule, Refills: 0    Associated Diagnoses: Reactive depression      lisinopril (ZESTRIL) 2.5 MG tablet Take 1 tablet (2.5 mg) by mouth 2 times daily  Qty: 60 tablet, Refills: 1    Associated Diagnoses: Acute on chronic systolic heart failure (H)      potassium chloride ER (KLOR-CON M) 20 MEQ CR tablet Take 1 tablet (20 mEq) by mouth 2 times daily  Qty: 60 tablet, Refills: 1    Associated Diagnoses: Acute on chronic systolic heart failure (H)      thiamine (B-1) 100 MG tablet Take 1 tablet (100 mg) by mouth daily  Qty: 30 tablet, Refills: 0    Associated Diagnoses: Acute on chronic systolic heart failure (H)      ticagrelor (BRILINTA) 90 MG tablet Take 1 tablet (90 mg) by mouth 2 times daily  Qty: 180 tablet, Refills: 3    Associated Diagnoses: Decompensated heart failure (H)         CONTINUE these medications which have NOT CHANGED    Details   atorvastatin (LIPITOR) 40 MG tablet Take 40 mg by mouth daily      enoxaparin ANTICOAGULANT (LOVENOX) 80 MG/0.8ML syringe Inject 70 mg Subcutaneous every 12 hours      famotidine (PEPCID) 20 MG tablet Take 20 mg by mouth 2 times daily      ferrous sulfate (FE TABS) 325 (65 Fe) MG EC tablet Take 325 mg by mouth daily (with lunch)      furosemide (LASIX) 40 MG tablet Take 40 mg by mouth 2 times daily      hydroxychloroquine (PLAQUENIL) 200 MG tablet Take 200 mg by mouth 2 times daily      LORazepam (ATIVAN) 0.5 MG tablet Take 0.5-1 mg by mouth every 4 hours as needed for anxiety      melatonin 3 MG tablet  Take 6 mg by mouth nightly as needed for sleep      mycophenolate (GENERIC EQUIVALENT) 500 MG tablet Take 1,500 mg by mouth 2 times daily      tamsulosin (FLOMAX) 0.4 MG capsule Take 0.4 mg by mouth daily      zolpidem (AMBIEN) 5 MG tablet Take 5 mg by mouth nightly as needed for sleep         STOP taking these medications       aspirin (ASA) 81 MG chewable tablet Comments:   Reason for Stopping:         METOPROLOL SUCCINATE ER PO Comments:   Reason for Stopping:         potassium chloride ER (K-TAB) 20 MEQ CR tablet Comments:   Reason for Stopping:               DISCHARGE DISPOSITION: Dandy Sands will discharge to home in stable condition.     DISCHARGE INSTRUCTIONS:  Discharge Procedure Orders   Adult Cardiology Eval  Referral   Standing Status: Future   Referral Priority: Routine: Next available opening Referral Type: CV Cardio consult   Requested Specialty: Cardiovascular Disease   Number of Visits Requested: 1     Cardiac Rehab Referral   Standing Status: Future   Referral Priority: Routine: Next available opening Referral Type: Rehab Therapy Cardiac Therapy   Number of Visits Requested: 1     Follow-Up with Cardiology LAURA   Standing Status: Future   Referral Priority: Routine: Next available opening Referral Type: Consultation   Requested Specialty: Cardiovascular Disease   Number of Visits Requested: 1     CARDIAC REHAB REFERRAL   Standing Status: Future   Referral Priority: Routine: Next available opening Referral Type: Rehab Therapy Cardiac Therapy   Number of Visits Requested: 1     CARDIAC REHAB REFERRAL   Standing Status: Future   Referral Priority: Routine: Next available opening Referral Type: Rehab Therapy Cardiac Therapy   Number of Visits Requested: 1     Brief Discharge Instructions   Order Comments: Do NOT stop your aspirin or platelet inhibitor unless directed by your Cardiologist.  These medications help to prevent platelets in your blood from sticking together and forming a clot.   Examples of these medications are:  Ticagrelor (Brilinta), Clopidigrel (Plavix), Prasugrel (Effient)     When to call - Contact the Heart Clinic   Order Comments: You may experience symptoms that require follow-up before your scheduled appointment. Contact the Heart Clinic if you develop: Fever over 100.4o Fahrenheit, that lasts more than one day; Redness, heat, or pus at the puncture site; Change in color or temperature in your groin or leg.     When to call - Reasons to Call 911   Order Comments: If your groin starts to bleed or begins to swell suddenly after leaving the hospital, lie flat and apply firm pressure just above the puncture site for 15 minutes.  If bleeding continues, call 9-1-1.     Precautions - Lifting   Order Comments: NO lifting of more than 10 pounds for at least 3 days.  If you usually lift 50 pounds or more daily, talk with your Cardiologist.     Precautions - Household Activities   Order Comments: Avoid any hard work or tiring activities.  NO physical activity such as mowing the lawn, raking, vacuuming, changing sheets on your bed, snow shoveling, or using a .     Precautions - Active Sports Activities   Order Comments: Avoid any tiring sports activities.  This includes, yard work, jogging, biking, bowling, swimming, tennis or golf, and sexual activity.     Precautions - Elective Dental Work   Order Comments: NO elective dental work for 6 weeks after receiving a stent.     Comfort and Pain Management - Pain after Surgery   Order Comments: Pain after surgery is normal and expected.  Your leg may be sore or stiff for a few days, and your pain will improve with time. You may take Tylenol or a pain medicine recommended by your Cardiologist.     Comfort and Pain Management - Bruising after Surgery   Order Comments: Bruising around the groin area is normal.  It may take 2-3 weeks for this to go away.  It is normal for the bruised area to turn green and/or yellow as it is healing.  A  small lump may also be present and may last 2-3 months.     Activity - Daily Walking   Order Comments: During the day get up and walk around every 2 hours.     Activity - Light Household Activities   Order Comments: Light household activities are ok.     Activity - Elevate Legs   Order Comments: Elevate legs in between all activities.     Activity - Cardiac Rehab   Order Comments: You are encouraged to enroll in an Outpatient Cardiac Rehab program after discharge from the hospital.  Our Cardiac Rehab staff may visit briefly with you while you're in the hospital.  If they miss you, someone will contact you after you are home.     Return to Driving   Order Comments: Driving is NOT permitted for 24 hours after surgery     Return to work   Order Comments: You may return to work after 72 hours if you are feeling well and your job does not involve heavy lifting.     Dressing Removal   Order Comments: You may take off the dressing on your groin the day after your procedure.     Incision Care   Order Comments: Keep the incision area dry and clean.  You do not need to use a bandage on your incision.     Shower / Bathing   Order Comments: It is ok to shower with regular soap. Pat dry, do not rub. No tub bath for 3 days. No swimming in a pool or hot tub immersion for 1 week     Reason aspirin not prescribed from this order set   Order Comments: admitted     Order Specific Question Answer Comments   Reason aspirin not prescribed from this order set Other (see comments)      Reason Lipid Lowering Medications not prescribed from this order set   Order Comments: Defer to inpatient team     Order Specific Question Answer Comments   Reason not prescribed Other (see comments)      Brief Discharge Instructions   Order Comments: Do NOT stop your aspirin or platelet inhibitor unless directed by your Cardiologist.  These medications help to prevent platelets in your blood from sticking together and forming a clot.  Examples of these  medications are:  Ticagrelor (Brilinta), Clopidigrel (Plavix), Prasugrel (Effient)     When to call - Contact the Heart Clinic   Order Comments: You may experience symptoms that require follow-up before your scheduled appointment. Contact the Heart Clinic if you develop: Fever over 100.4o Fahrenheit, that lasts more than one day; Redness, heat, or pus at the puncture site; Change in color or temperature in your groin or leg.     When to call - Reasons to Call 911   Order Comments: If your groin starts to bleed or begins to swell suddenly after leaving the hospital, lie flat and apply firm pressure just above the puncture site for 15 minutes.  If bleeding continues, call 9-1-1.     Precautions - Lifting   Order Comments: NO lifting of more than 10 pounds for at least 3 days.  If you usually lift 50 pounds or more daily, talk with your Cardiologist.     Precautions - Household Activities   Order Comments: Avoid any hard work or tiring activities.  NO physical activity such as mowing the lawn, raking, vacuuming, changing sheets on your bed, snow shoveling, or using a .     Precautions - Active Sports Activities   Order Comments: Avoid any tiring sports activities.  This includes, yard work, jogging, biking, bowling, swimming, tennis or golf, and sexual activity.     Precautions - Elective Dental Work   Order Comments: NO elective dental work for 6 weeks after receiving a stent.     Comfort and Pain Management - Pain after Surgery   Order Comments: Pain after surgery is normal and expected.  Your leg may be sore or stiff for a few days, and your pain will improve with time. You may take Tylenol or a pain medicine recommended by your Cardiologist.     Comfort and Pain Management - Bruising after Surgery   Order Comments: Bruising around the groin area is normal.  It may take 2-3 weeks for this to go away.  It is normal for the bruised area to turn green and/or yellow as it is healing.  A small lump may also be  present and may last 2-3 months.     Activity - Daily Walking   Order Comments: During the day get up and walk around every 2 hours.     Activity - Light Household Activities   Order Comments: Light household activities are ok.     Activity - Elevate Legs   Order Comments: Elevate legs in between all activities.     Activity - Cardiac Rehab   Order Comments: You are encouraged to enroll in an Outpatient Cardiac Rehab program after discharge from the hospital.  Our Cardiac Rehab staff may visit briefly with you while you're in the hospital.  If they miss you, someone will contact you after you are home.     Return to Driving   Order Comments: Driving is NOT permitted for 24 hours after surgery     Return to work   Order Comments: You may return to work after 72 hours if you are feeling well and your job does not involve heavy lifting.     Dressing Removal   Order Comments: You may take off the dressing on your groin the day after your procedure.     Incision Care   Order Comments: Keep the incision area dry and clean.  You do not need to use a bandage on your incision.     Shower / Bathing   Order Comments: It is ok to shower with regular soap. Pat dry, do not rub. No tub bath for 3 days. No swimming in a pool or hot tub immersion for 1 week     Reason Lipid Lowering Medications not prescribed from this order set     Order Specific Question Answer Comments   Reason not prescribed Other (see comments)        55 minutes spent in discharge, including >50% in counseling and coordination of care, medication review and plan of care recommended on follow up. Questions were answered, patient agrees to plan.        I have seen and examined the patient as part of a shared visit with RUI Oropeza  I agree with the note above. I reviewed today's vital signs and medications. I have reviewed and discussed with the advanced practice provider their physical examination, assessment, and plan   Briefly, admitted with acute on  chronic systolic heart failure  My key history/exam findings are: hemodynamically stable.  Grossly euvolemic on exam this afternoon.  The key management decisions made by me:stable for discharge     Roasline Mendez MD  Section Head - Advanced Heart Failure, Transplantation and Mechanical Circulatory Support  Director - Adult [facility]  Associate Professor of Medicine, [facility]    I spent 30 minutes in care of the patient today including obtaining his recent medical history, personally reviewing recent cardiac testing and/or lab results, today's examination, discussion of testing results and care recommendations with patient

## 2022-05-25 NOTE — PROGRESS NOTES
"Met with patient, Amos, son and Asha daughter to present the HM3 as a possible treatment option. Asha was included virtually.     Discussed patient and caregiver responsibilities before and after VAD implant. Clarified that, r/t COVID-19 visitor restrictions, only 2 caregivers may be trained. Clarified the need for a caregiver to be present for education and to assist patient for at least first 30 days after a patient returns home. Patient's designated caregiver is unknown at this time.     Discussed basic overview of VAD equipment, on going management, need for anticoagulation, regular dressing change, grounded three-pronged outlet and functioning telephone. Also discussed frequency of follow-up clinic appointments and the need to stay locally for at least 2-4 weeks.  Reviewed restrictions of having a VAD such as no swimming, bathing, boats, MRI's, or arc welding.     Provided and discussed the VAD educational brochure, information regarding the VAD and transplant support groups, and \"VAD What You Should Know\" with additional details. Patient and Amos signed \"VAD What You Should Know\" document (or agreed to have VAD Coordinator sign on their behalf). VAD coordinator contact information provided.  Encouraged patient, son, and daughter to call with questions. Learners verbalized understanding of the above information.      "

## 2022-05-25 NOTE — PROGRESS NOTES
internal jugular removal  D/ACT done and line removed at request of bedside RN.   I/flako blood back, dressing removed, cleansed with betadine, dapped dry, stitch removed, and placed supine and held breath during sheath removal, pressure held x10 minutes and the vein has bruising for about 1/3 inch near insertion site, site is flat and soft and no bleeding, dressing applied  P/bedside RN present and is updated.

## 2022-05-25 NOTE — CONSULTS
"    Inpatient Health Psychology Consultation    SUBJECTIVE:  Per H&P: Dandy Sands is a 61 year old male with history of CAD s/p remote PCI to LAD, ICM LVEF 30% s/p CRT-D, severe MR, APL with hx of DVT/PE on chronic anticoagulation with warfarin, hx of COVID-19 1/2022 (was hospitalized, not intubated), HTN, and HLD who presented to [facility] as a transfer from [facility] in [place] for evaluation of multivessel coronary artery disease and moderate-severe functional mitral regurgitation.    Patient seen today for follow-up regarding psychological interventions for anxiety management.  Today he reported that his anxiety was \"better\".  He described himself as feeling more calm and stated that the physiologic manifestation of anxiety was less than last week.  He attributed this to feeling more comfortable in the hospital environment, which has allowed his body to calm down.  He described no major worries at this time.  Briefly assessed his perspective regarding work-up for heart transplant, and he stated he would do \"what ever it takes\" to improve his health.  Has benefited from ongoing visits from his daughter.  Continues to take Ativan and Atarax as needed at nighttime, which he finds helpful.  Added Cymbalta per psychiatry.  Described getting a good night sleep last night, sleeping from approximately 1030 to 5 AM.  No current concerns noted, feeling as if medications and adjusting to the hospital environment have helped him adequately address anxiety.    OBJECTIVE:  He was sitting upright in his hospital bed upon my arrival.  Was alert.  Orientation not assessed.  Patient appeared much more coherent and conversive in conversation compared to last week.      ASSESSMENT:  Patient much more calm and coherent on interview as compared to last week.  Appears to be benefiting from psychotropic interventions for anxiety.      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  Health psychology will sign off as a result.  Per medical record, it appears that both neuropsychology and palliative care have been consulted to aid in preparation and work-up for consideration of advanced therapies including LVAD and heart transplant in the event he fails medical management. I will defer efforts related to this to those teams.  If patient is in need of further psychosocial care as he progresses towards surgical interventions, please feel free to reconsult our team.        Chantal Monsalve, PhD,   Clinical Health Psychologist      Start: 12:00 PM  Stop: 12:07 PM

## 2022-05-25 NOTE — CONSULTS
CARDIOTHORACIC SURGERY CONSULT NOTE  5/25/2022      Reason for Consult: LVAD and heart transplant evaluation      ASSESSMENT/PLAN:  Patient is a 61 year old male with a history of CAD s/p remote PCI to LAD in 2017, ICM LVEF 30% s/p CRT-D, severe MR, APL with hx of DVT/PE on chronic anticoagulation with coumadin, hx of COVID-19, HTN, and HLD who presented to [facility] as a transfer from [facility] in [place] for evaluation of multivessel coronary artery disease and moderate-severe MR. LHC at outside hospital showed severe ostial LAD disease with in-stent restenosis of mid LAD at diag bifurcation, severe proximal RCA disease, mild circ disease. Echo on 5/9 showed LVEF 30-35% with moderate to severe functional MR. Patient has since been diuresed and appears euvolemic. CVTS was initially consulted for consideration of CABG/possible MVR. The plan however has changed and patient will now undergo RCA and LAD stenting with medical management. His echocardiogram demonstrates dilated LV with LVEF 10-20%.  CVTS was re-consulted for consideration of advanced therapies including LVAD and heart transplant in the event he fails medical management.    - Agree with LVAD and transplant work-up, will discuss case at weekly heart failure conference  - Dental on board  - As prior: Has a history of lupus, on immunosuppression with mycophenolate 1500 mg BID, please consult rheumatology to assess whether he can hold or reduce doses of immunosuppression.  - Other cares per primary team  - Thank you for the opportunity to participate in the care of this patient.    Patient and plan discussed with attending, Dr. Zamora.    YAO Smalls, ACN-AG, CCRN  Nurse Practitioner  Cardiothoracic Surgery  Pager: [phone]      PMH:  No past medical history on file.    PSH:  Past Surgical History:   Procedure Laterality Date     COLONOSCOPY N/A 5/23/2022    Procedure: COLONOSCOPY;  Surgeon: Parth Meneses MD;  Location:  OR      CV RIGHT HEART CATH MEASUREMENTS RECORDED N/A 5/18/2022    Procedure: Right Heart Catheterization;  Surgeon: Justo Stewart MD;  Location:  HEART CARDIAC CATH LAB     CV RIGHT HEART EXERCISE STRESS STUDY N/A 5/18/2022    Procedure: Stress Drug Study;  Surgeon: Justo Stewart MD;  Location:  HEART CARDIAC CATH LAB       FH:  History reviewed. No pertinent family history.    SH:  Social History     Socioeconomic History     Marital status: Single     Spouse name: None     Number of children: None     Years of education: None     Highest education level: None       Home Meds:  Medications Prior to Admission   Medication Sig Dispense Refill Last Dose     aspirin (ASA) 81 MG chewable tablet Take 81 mg by mouth daily   5/16/2022 at 0700     atorvastatin (LIPITOR) 40 MG tablet Take 40 mg by mouth daily   5/16/2022 at 0700     enoxaparin ANTICOAGULANT (LOVENOX) 80 MG/0.8ML syringe Inject 70 mg Subcutaneous every 12 hours   5/16/2022 at 0700     famotidine (PEPCID) 20 MG tablet Take 20 mg by mouth 2 times daily   5/16/2022 at 0700     ferrous sulfate (FE TABS) 325 (65 Fe) MG EC tablet Take 325 mg by mouth daily (with lunch)   5/15/2022 at 1400     furosemide (LASIX) 40 MG tablet Take 40 mg by mouth 2 times daily   5/15/2022 at 1400     hydroxychloroquine (PLAQUENIL) 200 MG tablet Take 200 mg by mouth 2 times daily   5/16/2022 at 0700     LORazepam (ATIVAN) 0.5 MG tablet Take 0.5-1 mg by mouth every 4 hours as needed for anxiety   5/15/2022 at 2300     melatonin 3 MG tablet Take 6 mg by mouth nightly as needed for sleep   5/15/2022 at 2200     METOPROLOL SUCCINATE ER PO Take 12.5 mg by mouth At Bedtime   5/15/2022 at 2000     mycophenolate (GENERIC EQUIVALENT) 500 MG tablet Take 1,500 mg by mouth 2 times daily   5/16/2022 at 0700     potassium chloride ER (K-TAB) 20 MEQ CR tablet Take 20 mEq by mouth daily   5/16/2022 at 0700     tamsulosin (FLOMAX) 0.4 MG capsule Take 0.4 mg by mouth daily   5/16/2022 at 0700      zolpidem (AMBIEN) 5 MG tablet Take 5 mg by mouth nightly as needed for sleep   5/15/2022 at mn     Allergies:  No Known Allergies      Labs:  ABG No lab results found in last 7 days.  CBC  Recent Labs   Lab 05/25/22  0637 05/24/22  1415 05/24/22  1412 05/24/22  0810 05/23/22  0614 05/22/22  0712   WBC 5.9  --   --  4.1 4.1 5.7   HGB 9.5* 9.2* 8.9* 8.8* 8.7* 9.8*     --   --  252 251 274     BMP  Recent Labs   Lab 05/25/22  0637 05/24/22  1724 05/24/22  0810 05/23/22  1759    135 139 134   POTASSIUM 3.8 4.3 4.2 4.0   CHLORIDE 101 103 107 103   CO2 22 21 24 23   BUN 15 13 11 11   CR 0.97 0.89 0.91 0.97   GLC 84 87 83 88     LFT  Recent Labs   Lab 05/25/22  0637 05/24/22  1724 05/24/22  0810 05/23/22  1759 05/23/22  0614 05/22/22  1743 05/22/22  0712   AST 34 31 16 18 15   < > 26   ALT 47 56 51 59 63   < > 86*   ALKPHOS 74 83 74 69 67   < > 72   BILITOTAL 0.6 1.8* 0.7 0.6 0.6   < > 0.5   ALBUMIN 3.3* 3.5 3.2* 3.1* 3.1*   < > 3.4   INR 1.21*  --  1.23*  --  1.19*  --  1.13    < > = values in this interval not displayed.     PancreasNo lab results found in last 7 days.    Imaging:  Recent Results (from the past 24 hour(s))   Cardiac Catheterization    Narrative      Right sided filling pressures are mildly elevated.    Moderately elevated pulmonary artery hypertension.    Left sided filling pressures are moderately elevated.    Left ventricular filling pressures are severely elevated.    Normal cardiac output level.    Hemodynamic data has been modified in Epic per physician review.    Dist LAD lesion is 80% stenosed.    Prox LAD to Mid LAD lesion is 70% stenosed.    Ost LAD lesion is 90% stenosed.    Mid LM to Dist LM lesion is 40% stenosed.    Prox RCA lesion is 70% stenosed.     Two vessel coronary artery disease (90 ostial LAD, 80% in-stent restenosis   of pLAD to mLAD stent, 80% long, calcified stenosis of pRCA)  Successful PCI of ostial LAD to mLAD with a Synergy 3.00x32 mm TISHA.   Successful PCI of  proximal RCA with a Synergy 3.00x38 mm TISHA.   Imaging with IVUS of LM, proximal LAD, proximal RCA.  Shockwave lithotripsy of pLAD (80 pulses) and pRCA (40 pulses).  Hemostasis of R femoral artery with Angioseal.

## 2022-05-25 NOTE — PLAN OF CARE
"D: pt admitted on 5/16 from OSH for evaluation of multiple vessel CAD and moderate to severe functional mitral regurgitation. Found to have dilated left ventrical with LVEF 10-20% making him high risk surgical candidate. Now having LVAD/transplant workup.  PMH of CAD s/p remote PCI to LAD, ICM LVEF 30% s/p CRT-D, severe mitral regurgitation, APL with history of DVT/PE on chronic anticoagulation with warfarin, hx of COVID-19 (1/22) (was hospitalized, not intubated), HTN, HLD     I: Monitored vitals and assessed pt status.   Changes during this shift: Neuropsych eval, LVAD education. Writer paged palliative team to alert them that LVAD education had been given.  Running:   PRN Med:      Vitals: Blood pressure (!) 89/59, pulse 93, temperature 97.4  F (36.3  C), temperature source Oral, resp. rate 18, height 1.715 m (5' 7.5\"), weight 65.5 kg (144 lb 4.8 oz), SpO2 97 %.     A:   Neuro: Ax4 Denies Headache, dizziness,  Lightheadedness, numbness and tingling. calls appropriately.  Cardiac: SR, BBB HR 80s-high 90s  Afebrile, VSS.  Denies chest pain.   Respiratory: sating >95 ON RA. denies SOB. LS clear bilaterally.   Diet/appetite: 2gNa diet.   GI/:  Last BM 5/24. Denies abdominal pain. Good urine output to bedside urinal.  Activity:  Moves independently.  Pain: Slight tenderness at groin site.  Skin: RPIV SL.RIJ and R groin site WNL.  Plan: Continue to monitor and follow plan of care. Notify Cards 2 team of any change in patient status. Potential discharge to home tomorrow.  "

## 2022-05-25 NOTE — CONSULTS
Warfarin class not needed as patient has been on this at home. RN will check with patient to see if he has any question and/or concerns.    Etta Turk RN  [facility]  [phone]

## 2022-05-25 NOTE — PROGRESS NOTES
RT went to place pt on BIPAP. Upon talking with pt he stated that he does not wear one, however, he needs a study done to see if he does need one. Pt also stated he prefers to wait until study is done to wear one. Therefore, RT will discontinue order.    Oliver Gates, RRT.

## 2022-05-25 NOTE — PLAN OF CARE
D:  admitted to [facility] 5/16/2022 as a transfer from OSH for evaluation of multivessel CAD and moderate-severe functional mitral regurgitation. Found to have dilated LV with LVEF 10-20% making him high-risk surgical candidate. Currently undergoing LVAD/Transplant work up.  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 MD notified regarding soft BP. Ordered parameters for captopril-MN dose held.  Changed: R groin dressing  Running: PIV SL'd  PRN: Atarax, Ativan, Ambien     A: A0x4. VSS , on RA. Monitor SR/ST  w/0-5 PVC . (R)IJ sheath site flat, no bleeding or hematoma after sheath removal. (R)groin sheath site tender to touch. Small ooze noted on PMs, drsg changed and monitored. At 0200 small amt bloody drng noted on new drsg, at 0400 drsg noted to be saturated. Manual pressure applied to groin site x10 min- no ooze noted,  new dressing applied  Appeared to sleep well between cares. Good UO-has not been saving overnight.. No complaints voiced.    No intake/output data recorded. (not saved)    Temp:  [97.2  F (36.2  C)-98.3  F (36.8  C)] 97.2  F (36.2  C)  Pulse:  [83-99] 84  Resp:  [10-28] 16  BP: ()/(60-82) 93/65  SpO2:  [94 %-99 %] 97 %      P: Continue to monitor Pt status and report changes to treatment team. Monitor groin sheath site for bleeding /hematoma

## 2022-05-26 VITALS
OXYGEN SATURATION: 98 % | RESPIRATION RATE: 16 BRPM | HEIGHT: 68 IN | TEMPERATURE: 97.6 F | HEART RATE: 95 BPM | BODY MASS INDEX: 21.6 KG/M2 | DIASTOLIC BLOOD PRESSURE: 67 MMHG | WEIGHT: 142.5 LBS | SYSTOLIC BLOOD PRESSURE: 94 MMHG

## 2022-05-26 LAB
ALBUMIN SERPL-MCNC: 3.5 G/DL (ref 3.4–5)
ALP SERPL-CCNC: 79 U/L (ref 40–150)
ALT SERPL W P-5'-P-CCNC: 42 U/L (ref 0–70)
ANION GAP SERPL CALCULATED.3IONS-SCNC: 9 MMOL/L (ref 3–14)
AST SERPL W P-5'-P-CCNC: 29 U/L (ref 0–45)
BILIRUB SERPL-MCNC: 0.8 MG/DL (ref 0.2–1.3)
BUN SERPL-MCNC: 14 MG/DL (ref 7–30)
CALCIUM SERPL-MCNC: 9 MG/DL (ref 8.5–10.1)
CHLORIDE BLD-SCNC: 100 MMOL/L (ref 94–109)
CO2 SERPL-SCNC: 24 MMOL/L (ref 20–32)
CREAT SERPL-MCNC: 0.94 MG/DL (ref 0.66–1.25)
ERYTHROCYTE [DISTWIDTH] IN BLOOD BY AUTOMATED COUNT: 17.7 % (ref 10–15)
GFR SERPL CREATININE-BSD FRML MDRD: >90 ML/MIN/1.73M2
GLUCOSE BLD-MCNC: 83 MG/DL (ref 70–99)
HCT VFR BLD AUTO: 30.2 % (ref 40–53)
HGB BLD-MCNC: 9.8 G/DL (ref 13.3–17.7)
INR PPP: 1.24 (ref 0.85–1.15)
MAGNESIUM SERPL-MCNC: 2.2 MG/DL (ref 1.6–2.3)
MCH RBC QN AUTO: 28.7 PG (ref 26.5–33)
MCHC RBC AUTO-ENTMCNC: 32.5 G/DL (ref 31.5–36.5)
MCV RBC AUTO: 88 FL (ref 78–100)
PLATELET # BLD AUTO: 296 10E3/UL (ref 150–450)
POTASSIUM BLD-SCNC: 4 MMOL/L (ref 3.4–5.3)
PROT SERPL-MCNC: 7.5 G/DL (ref 6.8–8.8)
RBC # BLD AUTO: 3.42 10E6/UL (ref 4.4–5.9)
SODIUM SERPL-SCNC: 133 MMOL/L (ref 133–144)
WBC # BLD AUTO: 8.3 10E3/UL (ref 4–11)

## 2022-05-26 PROCEDURE — 250N000012 HC RX MED GY IP 250 OP 636 PS 637: Performed by: STUDENT IN AN ORGANIZED HEALTH CARE EDUCATION/TRAINING PROGRAM

## 2022-05-26 PROCEDURE — 85027 COMPLETE CBC AUTOMATED: CPT | Performed by: PHYSICIAN ASSISTANT

## 2022-05-26 PROCEDURE — 83735 ASSAY OF MAGNESIUM: CPT

## 2022-05-26 PROCEDURE — 250N000013 HC RX MED GY IP 250 OP 250 PS 637

## 2022-05-26 PROCEDURE — 85610 PROTHROMBIN TIME: CPT

## 2022-05-26 PROCEDURE — 250N000013 HC RX MED GY IP 250 OP 250 PS 637: Performed by: STUDENT IN AN ORGANIZED HEALTH CARE EDUCATION/TRAINING PROGRAM

## 2022-05-26 PROCEDURE — 250N000013 HC RX MED GY IP 250 OP 250 PS 637: Performed by: PHYSICIAN ASSISTANT

## 2022-05-26 PROCEDURE — 250N000011 HC RX IP 250 OP 636: Performed by: PHYSICIAN ASSISTANT

## 2022-05-26 PROCEDURE — 36415 COLL VENOUS BLD VENIPUNCTURE: CPT | Performed by: PHYSICIAN ASSISTANT

## 2022-05-26 PROCEDURE — 36415 COLL VENOUS BLD VENIPUNCTURE: CPT

## 2022-05-26 PROCEDURE — 99253 IP/OBS CNSLTJ NEW/EST LOW 45: CPT | Performed by: FAMILY MEDICINE

## 2022-05-26 PROCEDURE — 80053 COMPREHEN METABOLIC PANEL: CPT | Performed by: STUDENT IN AN ORGANIZED HEALTH CARE EDUCATION/TRAINING PROGRAM

## 2022-05-26 RX ORDER — ENOXAPARIN SODIUM 100 MG/ML
70 INJECTION SUBCUTANEOUS EVERY 12 HOURS
Qty: 12 ML | Refills: 0 | Status: ON HOLD | OUTPATIENT
Start: 2022-05-26 | End: 2022-05-31

## 2022-05-26 RX ORDER — WARFARIN SODIUM 5 MG/1
5 TABLET ORAL
Status: DISCONTINUED | OUTPATIENT
Start: 2022-05-26 | End: 2022-05-26 | Stop reason: HOSPADM

## 2022-05-26 RX ORDER — WARFARIN SODIUM 5 MG/1
5 TABLET ORAL DAILY
Refills: 0 | Status: ON HOLD | COMMUNITY
Start: 2022-05-26 | End: 2022-05-31

## 2022-05-26 RX ADMIN — Medication 1 TABLET: at 11:06

## 2022-05-26 RX ADMIN — FAMOTIDINE 20 MG: 20 TABLET ORAL at 08:48

## 2022-05-26 RX ADMIN — HYDROXYCHLOROQUINE SULFATE 200 MG: 200 TABLET, FILM COATED ORAL at 08:49

## 2022-05-26 RX ADMIN — LISINOPRIL 2.5 MG: 2.5 TABLET ORAL at 08:47

## 2022-05-26 RX ADMIN — DULOXETINE 30 MG: 30 CAPSULE, DELAYED RELEASE ORAL at 08:48

## 2022-05-26 RX ADMIN — POTASSIUM CHLORIDE 20 MEQ: 750 TABLET, EXTENDED RELEASE ORAL at 08:48

## 2022-05-26 RX ADMIN — ATORVASTATIN CALCIUM 40 MG: 40 TABLET, FILM COATED ORAL at 08:49

## 2022-05-26 RX ADMIN — ENOXAPARIN SODIUM 70 MG: 80 INJECTION SUBCUTANEOUS at 11:05

## 2022-05-26 RX ADMIN — FERROUS SULFATE TAB 325 MG (65 MG ELEMENTAL FE) 325 MG: 325 (65 FE) TAB at 11:06

## 2022-05-26 RX ADMIN — TICAGRELOR 90 MG: 90 TABLET ORAL at 08:48

## 2022-05-26 RX ADMIN — MYCOPHENOLATE MOFETIL 1500 MG: 500 TABLET, FILM COATED ORAL at 08:47

## 2022-05-26 RX ADMIN — THIAMINE HCL TAB 100 MG 100 MG: 100 TAB at 08:48

## 2022-05-26 RX ADMIN — FUROSEMIDE 40 MG: 40 TABLET ORAL at 08:47

## 2022-05-26 RX ADMIN — TAMSULOSIN HYDROCHLORIDE 0.4 MG: 0.4 CAPSULE ORAL at 08:47

## 2022-05-26 ASSESSMENT — ACTIVITIES OF DAILY LIVING (ADL)
ADLS_ACUITY_SCORE: 21

## 2022-05-26 NOTE — ANESTHESIA POSTPROCEDURE EVALUATION
Patient: Dandy Sands    Procedure: Procedure(s):  COLONOSCOPY       Anesthesia Type:  MAC    Note:  Disposition: Inpatient   Postop Pain Control: Uneventful            Sign Out: Well controlled pain   PONV: No   Neuro/Psych: Uneventful            Sign Out: Acceptable/Baseline neuro status   Airway/Respiratory: Uneventful            Sign Out: Acceptable/Baseline resp. status   CV/Hemodynamics: Uneventful            Sign Out: Acceptable CV status; No obvious hypovolemia; No obvious fluid overload   Other NRE: NONE   DID A NON-ROUTINE EVENT OCCUR? No           Last vitals:  Vitals:    05/25/22 1649 05/25/22 1909 05/25/22 2000   BP: (!) 89/59 97/67    Pulse: 93 90 84   Resp: 18 18 16   Temp: 36.3  C (97.4  F) 36.4  C (97.5  F)    SpO2: 97% 96%        Electronically Signed By: Claude Bowens MD  May 26, 2022  12:02 AM

## 2022-05-26 NOTE — PLAN OF CARE
Occupational Therapy Discharge Summary    Reason for therapy discharge:    Discharged to home with outpatient therapy.    Progress towards therapy goal(s). See goals on Care Plan in [facility] electronic health record for goal details.  Goals partially met.  Barriers to achieving goals:   discharge from facility.    Therapy recommendation(s):    Continued therapy is recommended.  Rationale/Recommendations:  out patient CR.

## 2022-05-26 NOTE — PROGRESS NOTES
Pt's recheck BP is 94/67. Cards 2 PA Valerie notified and aware. Pt is ok to discharge. Pt denies any chest pain/palpitations, dizziness, nausea, or chills. Informed and educated pt about symptoms when at home and to call appropriate number listed on discharge paperwork. Pt and family aware and understood instructions.

## 2022-05-26 NOTE — CONSULTS
[facility]  Palliative Care Consultation Note    Patient: Dandy Sands  Date of Admission:  5/16/2022    Requesting Clinician / Team:  CSI  Reason for consult: LVAD evaluation    Recommendations:      Patient's legally designated health care agent: daughter Asha is primary and son Nicholas is alternate      Patient's description of how they make decisions (by themselves, in consultation with certain close loved ones, based on advice from medical professionals, etc):  By himself      Patient's thoughts about what constitutes a 'good' quality of life/ what makes life worth living: being able to breathe comfortably; have energy to stay awake all day; do IADLs w/o getting tired.  Dandy is OK with a short term nursing home stay (3-4 months) as long as he is making progress toward discharge.      What are the activities/abilities that make your life worth living? Shooting pool 2x/week; knowing his kids and being able to converse with them; being able to go camping in his RV.      Patient's personal goals/hopes for receiving the LVAD and how they anticipate future with LVAD to be like: Dandy hopes the LVAD will be a bridge to transplant; he knows he'll need to be careful (no jumping into a river or pool) and keep the batteries charged up but he doesn't worry about it.      Patient's worries/concerns when considering receiving the LVAD: none really        Patient's thoughts about what health conditions would be unacceptable (situations the patient would want her/his doctors and loved ones to know that she/he would not want life prolonged)? If he didn't know his kids or was able to communicate with them; also if he was facing prolonged or permanent time in a nursing home      There are risks for kidney failure in patients with advancing heart disease who need LVAD support.  The places/locations that offer dialysis and accept patients with LVADs may be limited in your community.   What do you know about dialysis? Would you be open to doing dialysis and if so what quality of life concerns would you have? Dandy knows what dialysis is, he is open to doing it and figures he would relocate if no local dialysis centers could accommodate him. He doesn't see it as a burden.      An LVAD carries a risk of stroke which, for some people, may limit their ability to communicate their wishes to others.  After a stroke, what sort of outcomes would be unacceptable to you (ie needing to live in nursing facility, loss of independence, etc.) needing to live in a nursing facility long term would be difficult and unacceptable      The need to be on a ventilator/breathing machine through a tracheostomy (a tube in your neck) is a risk with LVAD. What are with circumstances you would want your medical providers and family to keep you alive with long term mechanical ventilation? Dandy thinks he would need to see how mechanical ventilation felt and what impact it had on his life before deciding this.  He would be OK with it for 3-4 months and then want the chance to reassess how it is going and what it feels like.      LVAD's are sometimes placed with the possibility of later pursuing heart transplant. Some of our patients are determined not to be heart transplant candidates, or choose to forego heart transplant surgery for personal reasons.  If you had an LVAD placed inside of you for the remainder of your life, what would be your concerns? That it would wear out at some point or that my body might 'be dying' and the LVAD would keep pumping.      What circumstances or events would lead you to decide or ask your health care agent to decide that you would want the LVAD turned off and be allowed to die a natural death? Dandy is open to almost any intervention for 3-4 months including trache, PEG, nursing home care, etc.  If any of those went on longer than that he would like a chance to reconsider his goals of care.  Also  if he was cognitively impaired and didn't know his kids or could communicate and had little promise of recovery he would not want to continue living like that.    These recommendations have been discussed with Laila from Cards 2 team.      Thank you for the opportunity to participate in the care of this patient and family. Our team: does not plan on following further, however do not hesitate to call or re-consult if we can be of further assistance to the patient/family.     During regular M-F work hours -- if you are not sure who specifically to contact -- please contact us by sending a text page to our team consult pager at [phone].    After regular work hours and on weekends/holidays, you can call our answering service at [phone]. Also, who's on call for us is available in [employer].      Anoop Martin MD MS FAAFP  [facility]  Office [phone]  Fax [phone]       Total time spent was 60 minutes spent in reviewing record, patient examination and family counseling with daughter, discussion with primary team and documentation.  >50% of time was spent counseling and/or coordination of care regarding goals of care as they pertain to LVAD placement.      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 Found to have dilated LV with LVEF 10-20% making him high-risk surgical candidate. Currently undergoing LVAD/Transplant work up.    Today, the patient was seen for: palliative care LVAD evaluation.    Prognosis, Goals, & Planning:       Functional Status just prior to hospitalization: 3 (Capable of only limited self-care; needs help with ADLs; in bed/chair >50% of waking hours)      Prognosis, Goals, and/or Advance Care Planning were addressed today: Yes   Dandy understands his prognosis is guarded without an LVAD/transplant          Patient has decision-making capacity today for complex decisions: Yes            I have concerns about the patient/family's health literacy today: No           Patient has a completed Health Care Directive: No.       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 Found to have dilated LV with LVEF 10-20% making him high-risk surgical candidate. Currently undergoing LVAD/Transplant work up.    ROS:  Besides above, a complete 10+ ROS was reviewed and is unremarkable       Past Medical History:  No past medical history on file.     Past Surgical History:  Past Surgical History:   Procedure Laterality Date     COLONOSCOPY N/A 5/23/2022    Procedure: COLONOSCOPY;  Surgeon: Parth Meneses MD;  Location: [facility] OR     CV RIGHT HEART CATH  MEASUREMENTS RECORDED N/A 5/18/2022    Procedure: Right Heart Catheterization;  Surgeon: Justo Stewart MD;  Location:  HEART CARDIAC CATH LAB     CV RIGHT HEART EXERCISE STRESS STUDY N/A 5/18/2022    Procedure: Stress Drug Study;  Surgeon: Justo Stewart MD;  Location:  HEART CARDIAC CATH LAB         Family History:  History reviewed. No pertinent family history.      Allergies:  No Known Allergies     Medications:  I have reviewed this patient's medication profile and medications from this hospitalization.     Physical Exam:  Vital Signs: Temp: 97.7  F (36.5  C) Temp src: Oral BP: 96/65 Pulse: 93   Resp: 16 SpO2: 97 % O2 Device: None (Room air)    Weight: 142 lbs 8 oz  Gen: awake, alert very interactive and conversant  Eyes: Conjunctiva clear. Sclera anicteric.  HENT: NCAT; mucous membranes moist  CV: RRR to palpation of right radial pulse and by telemetry monitoring  Resp: no increased work of breathing, regular respirations on RA  Msk: no gross deformity, modest sarcopenia  Skin:  no jaundice  Ext: warm, well perfused.    Neuro:  A&O x 3;  CN II-XII grossly intact; FINCH  Mental status/Psych:   speech- coherent , normal rate and volume; able to articulate logical thoughts, able to abstract reason, no tangential thoughts, no hallucinations or delusions, mentation appears normal, Mood is euthymic. Affect is appropriate for this mood state and bright. Thought content is free of suicidal ideation, hallucinations, and delusions.  Eye contact is good during conversation.       Data reviewed:  Reviewed recent labs and pertinent imaging    Cr 0.94; albumin 3.5; WBC 8.3; Hgb 9.8; INR 1.24

## 2022-05-26 NOTE — CONSULTS
Dandy Sands is a 61-year-old patient who is being evaluated via a billable video visit. Telemedicine services are necessary because of the COVID-19 pandemic.   Patient has given verbal consent for Video visit? Yes   Will anyone else be joining your video visit? No  Interview:   Start Time: 9:00 AM   End Time: 9:30 AM  Formal Testing:   Start Time: 9:33 AM   End Time: 11:43 AM  Originating Location (pt. Location): [address]  Distant Location (provider location): [facility] NEUROPSYCHOLOGY   Platform used for Video Visit: i2i [employer]    NAME: Dandy Sands  MRN: [id number]  : 1960  CASEY: 2022  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 He is being considered for advanced therapies for heart failure, including LVAD and transplant. Dr. Chris Lawrence ordered this neuropsychological evaluation of brain functioning as part of the standard pre-procedure protocol, to better understand cognitive and psychosocial factors that affect LVAD/transplant candidacy.    In today s interview, Mr. Sands provides a mixed understanding of his medical history and current concerns. For example, he knew he just underwent a surgery during this hospitalization, but he could not recall what the surgery was for (the nurse at bedside reminded him he just got a stent put in). He tells me that heart failure was first diagnosed when he had his  heart attack around age 46. If he goes through with advanced therapies, he hopes to be able to live longer and to be able to breathe without much effort. He has not had LVAD/transplant education sessions yet, and he says he does not really have any idea of what the presurgical work-up entails. He says he has not been told anything about potential risks with the treatments. I introduced some of the major risks and lifestyle limitations to him.    He is aware of the need to relocate to the [place] area for 30 days after his surgery. He says that his daughter, who lives in [place], [place], will be able to come here with him because she is able to work from home. He does not know what kind of work she does. He also has a son who lives in [place]. He currently lives in [place], [place], but he plans to sell his home and move into an apartment in [place].    He has received consultations with Health Psychology during this admission, to help him manage anxiety. He is also being treated with duloxetine, hydroxyzine, and lorazepam. He takes zolpidem and melatonin for sleep as well. He says that he gets restless easily and that anxiety has been significant while in the hospital. He denies any issues with anxiety prior to this hospitalization. He says he has never participated in psychotherapy before or taken medications for anxiety prior to this hospital admission (our H&P indicates lorazepam, hydroxyzine, and melatonin were existing prescriptions). He reports no history of psychiatric hospitalizations, hallucinatory experiences, or suicidal ideation.    There is a history of alcohol abuse in his late teens to early 20s. He says he went through a chemical dependency treatment program at age 19 or 20. Since then, he says he has had limited or minimal alcohol use, especially in recent years because the medications he takes have bad interactions with alcohol. He says he has not had any alcohol in the last 5 months. He  reports no history of illicit drug use. He used to smoke tobacco but quit in 2009. He does not use vaping products.    He reports a history of DWI in his late teens, but no legal trouble since then. He reports no problems with gambling or any other addictive/compulsive style behaviors.    Regarding early cognitive history, he reports that math was always a difficult subject for him in school, but he was never failed classes or was held back in any grades. He completed 10th grade and part of 11th, then he left school. This was in the time of problems with alcohol abuse. He later got a GED in the [employer]. He spent 4 years in the [employer] working as a . His primary career has been as a . He last worked about 2 months ago. He says he is not going back to work and now plans on retiring.    He feels like he has some bad days where he is more forgetful than usual, but he still remains independent in daily living. He has to write everything down in order to keep track of his finances and bills. He feels like he has a workable system for tracking his daily medications. He has not changed his driving habits in any way.    Regarding neurological history, he reports no instances of unilateral weakness or numbness aside from his first heart attack, which was followed by a return to normal functioning. He does not have tremors. He does not have balance or coordination troubles. There is no history of stroke, seizure, or TBI. He does have migraines. The last one was about 2 weeks ago. He manages them with Tylenol and darkening the room. His senses of smell and taste were affected when he had COVID in January 2022, but they have since returned to normal. His hearing is lower with age, but he says he does not need hearing aids. Visual acuity is also changed with age, and he now uses reading glasses.    Reported family history includes alcohol abuse problems for his father and daughter. His daughter has been sober for  a decade.    BEHAVIORAL OBSERVATIONS    Mr. Sands was polite and cooperative with the evaluation. His speech was slightly dysarthric. As noted, his understanding of treatments received during this admission was spotty, but he had a generally reasonable understanding of the main points of his cardiac history and current concerns. Thought processes were linear and goal-directed. Language comprehension was normal. He was alert and attentive, despite numerous interruptions from staff and his roommate. His effort and persistence were appropriate. The test results may provide reasonable reflections of his cognitive abilities. However, the conditions of the assessment are not standard as the visit was conducted by videoconference instead of in person, which may render inaccurate any comparisons to standard normative data.    MEASURES ADMINISTERED    The following measures were administered by a trained psychometrist, under my supervision:    Orientation: Time, Place, Basic Personal Information, Recent US Presidents; Wide Range Achievement Test 5: Word Reading; Wechsler Adult Intelligence Scales-IV: Vocabulary, Similarities, Matrix Reasoning, Digit Span; [place] Naming Test; Controlled Oral Word Association Test; Animal Naming Test; Complex Ideational Material; Brad-Osterrieth Complex Figure Test; Clock Drawing; Oral Trail-Making Test; Test of Sustained Attention & Tracking; Repeatable Battery for the Assessment of Neuropsychological Status: Immediate and Delayed Stories, Judgment of Line Orientation; Kent Verbal Learning Test-Revised; ILS Health and Safety Questionnaire; PHQ-9; MGIUEL-7; [place] Multiphasic Personality Inventory-3.    RESULTS AND INTERPRETATION    Orientation to time was mildly below normal expectations. Orientation was normal for place, basic personal information, and basic cultural information. Performance on a reading and pronunciation test that is validated for estimating premorbid intelligence was  low average. Abstract verbal analogical reasoning was low average. Demonstrating vocabulary knowledge was low average. Practical reasoning for a variety of health and safety scenarios was slightly low. Visual reasoning through pattern identification was below average. Clock drawing was abnormal. Visual perceptual matching and discrimination of variably oriented lines was average. Copying a complex geometric figure was exceptionally low. Immediate auditory attention and working memory were below average for repeating and rearranging digit strings. Performances were exceptionally low across a variety of brief verbal tasks requiring executive management of attention and concentration. Confrontation naming was below average. Letter-based verbal fluency was average. Category-based verbal fluency was exceptionally low. Oral comprehension and making inferences from sentences and brief paragraphs was exceptionally low. Immediate verbal memory for short stories was exceptionally low, and delayed story recall was exceptionally low. Learning a word list over repeated readings was exceptionally low. Delayed free recall of the list was exceptionally low, and delayed recognition of the list was exceptionally low.     On brief self-report inventories, he endorsed mildly elevated symptoms related to depression (PHQ-9 = 9) and anxiety (MIGUEL-7 = 6).     His response set to a longer questionnaire for objective assessment of personality functioning and emotional coping patterns (MMPI-3) was valid and interpretable, with no abnormal findings on the embedded response validity scales. There were no abnormal results on the core clinical scales, suggesting generally good adjustment and absence of personality pathology. Among the various supplemental scales, he endorsed severe physical malaise that is likely attributable to his current illness requiring hospitalization. He also endorsed cognitive dysfunction such as confusion, poor memory,  inattention, and disorganization. He reported periods of heightened activation and energy levels, potentially indicating a history of hypomanic or manic episodes, or perhaps hyperactivity/impulsiveness issues. Diagnostic considerations could include bipolar mood disorders, cognitive dysfunction associated with medical illness, and an ADHD-like syndrome. The MMPI-3 does not independently render psychiatric diagnoses. It raises clinical hypotheses for further exploration in the context of mental health treatment.    IMPRESSIONS AND RECOMMENDATIONS    In the context of nonstandard and limited assessment via video conference instead of in person (as necessitated by the current COVID-19 situation), the cognitive test results are abnormal. Mr. Sands demonstrates diffuse deficits in the cognitive domains assessed today, with only a few performances reaching the lower end of normal/average while most are below average. There are no observed fluctuations in alertness and cognizance to suggest delirium. I think his cognitive capacity is suppressed by encephalopathy from his current ill state. I cannot rule out lingering effects from sedation in his surgery yesterday afternoon or sleep aids/anxiolytics administered last night. The cognitive data are consistent with troubles seen in the interview, where he is a bit confused and slow to recall some information. His cognitive state will likely improve as his health stabilizes.     For now, I would advise having his family involved in all decision making of consequence.     Today s VAD/transplant education visits will likely need to be repeated, as his capacity to take the information in right now is very low.     Prior to discharge, it would be prudent to have Occupational Therapy assess for any care and oversight needed in ADLs    As a long-term plan, I agree that it is a good idea for him to move near to his children.     In the coming months when he returns for other  appointments, I would like to see Mr. Sands for a brief outpatient visit to assess his cognitive state when he is in more stable physical health.     There are no apparent issues with substance use or behavioral problems. He needs continued attention to mood and anxiety symptoms, but this is not a barrier to LVAD/transplant candidacy. He appears to have good support from family, but there seems to be a fair bit to work out on his postsurgical caregiving plan.       Miah Villafuerte, PhD, LP, ABPP-CN  Board Certified in Clinical Neuropsychology  Licensed Psychologist [id number]      Time spent: One unit psychiatric evaluation including records review, interview, and clinical assessment licensed and board-certified neuropsychologist (CPT 83286). 93 minutes neuropsychological testing evaluation by licensed and board-certified neuropsychologist, including integration of patient data, interpretation of standardized test results and clinical data, clinical decision-making, treatment planning, report, and interactive feedback to the patient (CPT 60525, 62754). 140 minutes of psychological and neuropsychological test administration and scoring by technician (CPT 02124, 00616). Diagnoses: F09, I50.22, Z01.818

## 2022-05-26 NOTE — PROGRESS NOTES
"Name  Dandy Sands  CASEY 22       MRN  [id number]  Provider ANDREA         [birth year]   [facility]       Age  61   [address] 6412-01       Sex  Male   Visit Type Video       Education  10   Platform Keyon       Handedness Right                        ORIENTATION     CLOCK DRAWING      Time  -4    Command  BORDERLINE     Personal Information    Copy  NORMAL     Place   /          Presidents     ORAL TRAILS              Raw z Errors   WRAT-4 READING     Trails A  6 0.5 0   Raw  55    Trails B  DC @ 33\" \"too hard.\" 0 0   SS  87           %ile  19    TSAT       Grade Equivalence 10.2      Raw z          Total Time  222 -3.88718    WAIS-IV      Total Errors  19 -6.68      Raw SS RDS         Digit Span  16 5 8  RBANS       Vocabulary  23 6     Raw z SS/%ile   Matrix Reasoning 6 5   Line Orientation 16 -0.21 26-50   Similarities  19 7   Story Immediate 10 -2.4 4   Est. VCI   81   Story Delay  4 -2.52 4                [place] NAMING TEST     HVLT       Raw 47       Raw T    SS 6 %ile 6-10   Trial 1  2     Total Stim. Correct 0    Trial 2  3     Total Phon. Correct 4    Trial 3  3           Learning  1     COWAT      Total Recall  8 <20    Form CFL     Delayed Recall 2 <20    Raw 30     Percent Retention 67 32    SS 9     True Positives 9     %ile 29-40 z 0   False Positives 4           Discrimination Index 5 <20    ANIMAL FLUENCY            Raw 4     ILS HEALTH & SAFETY QUESTIONNAIRE     SS 0 z 0   Total  32     T 12     Classification MODERATE                  COMPLEX IDEATIONAL MATERIAL    PHQ-9       Raw  5    Total  9     SS  2    Interpretation MILD     T  15                 MIGUEL-7       HAYLEE-O COMPLEX FIGURE    Total  6       Raw %ile   Interpretation MILD     Copy  20.5 <1          Time to Copy 192 >16   MMPI-3             See report for interpretation         "

## 2022-05-26 NOTE — PLAN OF CARE
Temp: 97.6  F (36.4  C) Temp src: Oral BP: (!) 89/61 Pulse: 88   Resp: 18 SpO2: 98 % O2 Device: None (Room air)       D: Transferred from OSH for CAD tx, not a candidate for CABG. Now s/p PCI 5/24. Undergoing advanced HF work-up. Hx CAD, ICM (LVEF 10-20%) s/p CRT-D, severe MR, APL with DVT/PE, COVID-19 1/2022, HTN, HLD    I/A: Dandy is A&O x4. Tele in place, SR. VSS on RA. PIV in place, SL. Up independently. Appeared to sleep between cares overnight    P: Continue to monitor and follow POC. Plan for discharge 5/26. Notify Cards 2 with changes      Goal Outcome Evaluation:    Plan of Care Reviewed With: patient   Overall Patient Progress: improving  Outcome Evaluation: advanced HF work-up nearly complete

## 2022-05-26 NOTE — PROGRESS NOTES
Pre-VAD/Tx Social Work Services Progress Note      Date of Initial Social Work Evaluation: 5/18/2022  Collaborated with: Maricel    Data: Concerns have been brought up by Dandy and his family about the patient's health insurance situation and options available to him. 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 He has about 4 weeks left of FMLA and after this expires, will need to go onto COBRA.  Intervention: Talked with Transplant finance and placed call to Asha to talk through some options. Provided her information on SHIP (State organization to help with identifying health insurance plans). Encouraged them to make an appointment with an  to discuss the State of [place]'s options for individual health insurance plans.  Assessment: Asha verbalized understanding and has been helping him explore options. She is also considering adding him to her insurance plan as a dependent, but needs to find out how to qualify him for this. She will explore talking with insurance agents and call [employer] for assistance with the patient's health insurance situation.  Education provided by FAVIOLA: Insurance options  Plan:    Discharge Plans in Progress: Home today    Barriers to d/c plan: None    Follow up Plan: FAVIOLA provided contact information for further discussion about health insurance options, community resources, and caregiver support as it pertains to proceeding with VAD or transplant.

## 2022-05-26 NOTE — CONSULTS
[facility]  Palliative Care Consultation Note    Patient: Dandy Sands  Date of Admission:  5/16/2022     This consult note was opened and while the chart was being reviewed the consult was canceled so no palliative care consult was performed.    Anoop Martin MD MS FAAFP  [facility] Palliative Care Service  Office [phone]  Fax [phone]

## 2022-05-26 NOTE — PROGRESS NOTES
Care Management Discharge Note    Discharge Date: 05/26/2022       Discharge Disposition: Home    Discharge Services:  Outpatient Cardiac rehab: [facility]  Ph: [phone]  Fax; [phone] I fax'd AVS/H&P/d/c summary and a/c dosing sheet.  I called Rakesh and explained pt's having a PCC @ [facility] for 1-2 more months, but follows with cardiology at [place]--Rakesh will call the pt to schedule.    Pt to see a [place] [facility] LAURA in 1-2 weeks and Dr Stewart in June--[facility]  is working on this.    Pt to follow with current [facility] PCP for 1-2 months/is on warfarin --to have INR tomorrow.  After 1-2 months(after seeing cardiology at [place])--pt is to change his PCP to the [place] system,  I called [place] Physician finder: Genevieve mario; [phone]/Fax; [phone] and pt will call this number when he is ready to transfer and they will assist him. I have fax'd [facility] [facility] his H&P/MD notes/MD discharge summary /anticoagulation dosing sheet.  [facility]  [address]   [facility]  Days: Mon-Fri 8am=5pm    INR RN @ [place] is: [phone]    Discharge DME:  none    Discharge Transportation: car, drives self, family or friend will provide    Private pay costs discussed: he may have copays for clinic services    PAS Confirmation Code:    Patient/family educated on Medicare website which has current facility and service quality ratings:  n/a    Education Provided on the Discharge Plan:  Yes with daughter Asha and pt  Persons Notified of Discharge Plans: Asha and pt    Patient/Family in Agreement with the Plan:      Handoff Referral Completed: Yes    Additional Information:  I fax'd to [facility] and [facility] [facility] system--see above.        Leann Falcon RN

## 2022-05-27 ENCOUNTER — HOSPITAL ENCOUNTER (INPATIENT)
Facility: CLINIC | Age: 62
LOS: 3 days | Discharge: HOME | DRG: 286 | End: 2022-05-31
Attending: INTERNAL MEDICINE | Admitting: INTERNAL MEDICINE
Payer: COMMERCIAL

## 2022-05-27 ENCOUNTER — MEDICAL CORRESPONDENCE (OUTPATIENT)
Dept: HEALTH INFORMATION MANAGEMENT | Facility: CLINIC | Age: 62
End: 2022-05-27

## 2022-05-27 ENCOUNTER — NURSE TRIAGE (OUTPATIENT)
Dept: CARDIOLOGY | Facility: CLINIC | Age: 62
End: 2022-05-27

## 2022-05-27 DIAGNOSIS — I50.9 DECOMPENSATED HEART FAILURE (H): ICD-10-CM

## 2022-05-27 DIAGNOSIS — F41.9 ANXIETY: ICD-10-CM

## 2022-05-27 DIAGNOSIS — R57.0 CARDIOGENIC SHOCK (H): Primary | ICD-10-CM

## 2022-05-27 DIAGNOSIS — R11.0 NAUSEA: ICD-10-CM

## 2022-05-27 DIAGNOSIS — M32.19 OTHER SYSTEMIC LUPUS ERYTHEMATOSUS WITH OTHER ORGAN INVOLVEMENT (H): ICD-10-CM

## 2022-05-27 LAB
ALBUMIN SERPL-MCNC: 3.2 G/DL (ref 3.4–5)
ALP SERPL-CCNC: 69 U/L (ref 40–150)
ALT SERPL W P-5'-P-CCNC: 33 U/L (ref 0–70)
ANION GAP SERPL CALCULATED.3IONS-SCNC: 8 MMOL/L (ref 3–14)
AST SERPL W P-5'-P-CCNC: 18 U/L (ref 0–45)
BILIRUB SERPL-MCNC: 0.6 MG/DL (ref 0.2–1.3)
BUN SERPL-MCNC: 12 MG/DL (ref 7–30)
CALCIUM SERPL-MCNC: 8.9 MG/DL (ref 8.5–10.1)
CHLORIDE BLD-SCNC: 102 MMOL/L (ref 94–109)
CO2 SERPL-SCNC: 24 MMOL/L (ref 20–32)
CREAT SERPL-MCNC: 0.85 MG/DL (ref 0.66–1.25)
ERYTHROCYTE [DISTWIDTH] IN BLOOD BY AUTOMATED COUNT: 17.5 % (ref 10–15)
GFR SERPL CREATININE-BSD FRML MDRD: >90 ML/MIN/1.73M2
GLUCOSE BLD-MCNC: 97 MG/DL (ref 70–99)
HCT VFR BLD AUTO: 28.1 % (ref 40–53)
HGB BLD-MCNC: 9 G/DL (ref 13.3–17.7)
LACTATE SERPL-SCNC: 0.7 MMOL/L (ref 0.7–2)
MAGNESIUM SERPL-MCNC: 2.1 MG/DL (ref 1.6–2.3)
MCH RBC QN AUTO: 28.5 PG (ref 26.5–33)
MCHC RBC AUTO-ENTMCNC: 32 G/DL (ref 31.5–36.5)
MCV RBC AUTO: 89 FL (ref 78–100)
NT-PROBNP SERPL-MCNC: 2006 PG/ML (ref 0–900)
PLATELET # BLD AUTO: 300 10E3/UL (ref 150–450)
POTASSIUM BLD-SCNC: 3.6 MMOL/L (ref 3.4–5.3)
PROT SERPL-MCNC: 6.9 G/DL (ref 6.8–8.8)
RBC # BLD AUTO: 3.16 10E6/UL (ref 4.4–5.9)
SA 1 CELL: NORMAL
SA 1 TEST METHOD: NORMAL
SA 2 CELL: NORMAL
SA 2 TEST METHOD: NORMAL
SA1 HI RISK ABY: NORMAL
SA1 MOD RISK ABY: NORMAL
SA2 HI RISK ABY: NORMAL
SA2 MOD RISK ABY: NORMAL
SODIUM SERPL-SCNC: 134 MMOL/L (ref 133–144)
UNACCEPTABLE ANTIGENS: NORMAL
UNOS CPRA: 69
WBC # BLD AUTO: 5.4 10E3/UL (ref 4–11)
ZZZSA 1  COMMENTS: NORMAL
ZZZSA 2 COMMENTS: NORMAL

## 2022-05-27 PROCEDURE — 83735 ASSAY OF MAGNESIUM: CPT | Performed by: STUDENT IN AN ORGANIZED HEALTH CARE EDUCATION/TRAINING PROGRAM

## 2022-05-27 PROCEDURE — 83880 ASSAY OF NATRIURETIC PEPTIDE: CPT | Performed by: STUDENT IN AN ORGANIZED HEALTH CARE EDUCATION/TRAINING PROGRAM

## 2022-05-27 PROCEDURE — 99223 1ST HOSP IP/OBS HIGH 75: CPT | Mod: AI | Performed by: INTERNAL MEDICINE

## 2022-05-27 PROCEDURE — 85027 COMPLETE CBC AUTOMATED: CPT | Performed by: STUDENT IN AN ORGANIZED HEALTH CARE EDUCATION/TRAINING PROGRAM

## 2022-05-27 PROCEDURE — 36415 COLL VENOUS BLD VENIPUNCTURE: CPT | Performed by: STUDENT IN AN ORGANIZED HEALTH CARE EDUCATION/TRAINING PROGRAM

## 2022-05-27 PROCEDURE — 80053 COMPREHEN METABOLIC PANEL: CPT | Performed by: STUDENT IN AN ORGANIZED HEALTH CARE EDUCATION/TRAINING PROGRAM

## 2022-05-27 PROCEDURE — 83605 ASSAY OF LACTIC ACID: CPT | Performed by: STUDENT IN AN ORGANIZED HEALTH CARE EDUCATION/TRAINING PROGRAM

## 2022-05-27 PROCEDURE — 85610 PROTHROMBIN TIME: CPT | Performed by: STUDENT IN AN ORGANIZED HEALTH CARE EDUCATION/TRAINING PROGRAM

## 2022-05-27 RX ORDER — LANOLIN ALCOHOL/MO/W.PET/CERES
6 CREAM (GRAM) TOPICAL
Status: DISCONTINUED | OUTPATIENT
Start: 2022-05-27 | End: 2022-05-31 | Stop reason: HOSPADM

## 2022-05-27 RX ORDER — ATORVASTATIN CALCIUM 40 MG/1
40 TABLET, FILM COATED ORAL DAILY
Status: DISCONTINUED | OUTPATIENT
Start: 2022-05-28 | End: 2022-05-30

## 2022-05-27 RX ORDER — TAMSULOSIN HYDROCHLORIDE 0.4 MG/1
0.4 CAPSULE ORAL DAILY
Status: DISCONTINUED | OUTPATIENT
Start: 2022-05-28 | End: 2022-05-30

## 2022-05-27 RX ORDER — ENOXAPARIN SODIUM 100 MG/ML
70 INJECTION SUBCUTANEOUS EVERY 12 HOURS
Status: DISCONTINUED | OUTPATIENT
Start: 2022-05-27 | End: 2022-05-31

## 2022-05-27 RX ORDER — HYDROXYCHLOROQUINE SULFATE 200 MG/1
200 TABLET, FILM COATED ORAL 2 TIMES DAILY
Status: DISCONTINUED | OUTPATIENT
Start: 2022-05-27 | End: 2022-05-31 | Stop reason: HOSPADM

## 2022-05-27 RX ORDER — FAMOTIDINE 20 MG/1
20 TABLET, FILM COATED ORAL 2 TIMES DAILY
Status: DISCONTINUED | OUTPATIENT
Start: 2022-05-27 | End: 2022-05-31 | Stop reason: HOSPADM

## 2022-05-27 RX ORDER — DULOXETIN HYDROCHLORIDE 30 MG/1
30 CAPSULE, DELAYED RELEASE ORAL DAILY
Status: DISCONTINUED | OUTPATIENT
Start: 2022-05-28 | End: 2022-05-30

## 2022-05-27 NOTE — PLAN OF CARE
DISCHARGE   Discharged to: Home  Via: Automobile  Accompanied by: Family  Discharge Instructions: diet, activity, medications, follow up appointments, when to call the MD, and what to watchout for (i.e. s/s of infection, increasing SOB, palpitations, chest pain)  Prescriptions: To be filled by discharge pharmacy per pt's request; medication list reviewed & sent with pt  Follow Up Appointments: arranged; information given  Belongings: All sent with pt  IV: out  Telemetry: off  Pt exhibits understanding of above discharge instructions; all questions answered.    Reshma Escobedo RN

## 2022-05-27 NOTE — TELEPHONE ENCOUNTER
Reviewed with Valerie FABIAN who cared for patient in hospital. She will touch base with [facility] regarding this patient.

## 2022-05-27 NOTE — TELEPHONE ENCOUNTER
"    Answer Assessment - Initial Assessment Questions  1. REASON FOR CALL or QUESTION: \"What is your reason for calling today?\" or \"How can I best help you?\" or \"What question do you have that I can help answer?\"      Patient's daughter Asha called from [place]. Her Dad was discharged from the [facility] yesterday and they drove him home (4 1/2 hr) . They got all his meds laid out for him to take this morning but he is too weak to take them or eat anything. He is on fluid restriction so he can't drink much.  He was awake all night. Just had an angiogram 5/24. He has severe CHF and they have been told if he isn't doing ok on current medications he needs to go back to the hospital. She said her brother will take him to [facility] ER because they are transferring his care there and he has an appointment with Dr. Stewart in June. I told her that is fine, if he can't eat and take his meds he needs to go to the hospital.   *Daughter and son are NOT listed as having permission to speak with us regarding her Dad per our Epic but Asha said everyone has been communicating with her for the past 3 weeks. Since she was not with her Dad I did talk with her to advise her on what to do for her Dad. *    Protocols used: NO PROTOCOL AVAILABLE - INFORMATION ONLY-A-OH      "

## 2022-05-28 ENCOUNTER — APPOINTMENT (OUTPATIENT)
Dept: OCCUPATIONAL THERAPY | Facility: CLINIC | Age: 62
DRG: 286 | End: 2022-05-28
Attending: INTERNAL MEDICINE
Payer: COMMERCIAL

## 2022-05-28 PROBLEM — R57.0 CARDIOGENIC SHOCK (H): Status: ACTIVE | Noted: 2022-05-28

## 2022-05-28 LAB
BASE EXCESS BLDV CALC-SCNC: -0.5 MMOL/L (ref -7.7–1.9)
BASE EXCESS BLDV CALC-SCNC: -1 MMOL/L (ref -7.7–1.9)
HCO3 BLDV-SCNC: 23 MMOL/L (ref 21–28)
HCO3 BLDV-SCNC: 24 MMOL/L (ref 21–28)
INR PPP: 1.24 (ref 0.85–1.15)
INR PPP: 1.25 (ref 0.85–1.15)
O2/TOTAL GAS SETTING VFR VENT: 99 %
O2/TOTAL GAS SETTING VFR VENT: 99 %
OXYHGB MFR BLDV: 51 % (ref 70–75)
OXYHGB MFR BLDV: 62 % (ref 70–75)
PCO2 BLDV: 35 MM HG (ref 40–50)
PCO2 BLDV: 37 MM HG (ref 40–50)
PH BLDV: 7.42 [PH] (ref 7.32–7.43)
PH BLDV: 7.43 [PH] (ref 7.32–7.43)
PO2 BLDV: 29 MM HG (ref 25–47)
PO2 BLDV: 32 MM HG (ref 25–47)
SARS-COV-2 RNA RESP QL NAA+PROBE: NEGATIVE
TROPONIN I SERPL HS-MCNC: 5272 NG/L
TROPONIN I SERPL HS-MCNC: 5633 NG/L

## 2022-05-28 PROCEDURE — 97165 OT EVAL LOW COMPLEX 30 MIN: CPT | Mod: GO

## 2022-05-28 PROCEDURE — 250N000012 HC RX MED GY IP 250 OP 636 PS 637: Performed by: STUDENT IN AN ORGANIZED HEALTH CARE EDUCATION/TRAINING PROGRAM

## 2022-05-28 PROCEDURE — 97530 THERAPEUTIC ACTIVITIES: CPT | Mod: GO

## 2022-05-28 PROCEDURE — 36415 COLL VENOUS BLD VENIPUNCTURE: CPT | Performed by: STUDENT IN AN ORGANIZED HEALTH CARE EDUCATION/TRAINING PROGRAM

## 2022-05-28 PROCEDURE — 272N000278 HC DEVICE 5FR SECURACATH

## 2022-05-28 PROCEDURE — 120N000003 HC R&B IMCU UMMC

## 2022-05-28 PROCEDURE — 272N000103 HC INTRODUCER MICRO SET

## 2022-05-28 PROCEDURE — 272N000201 ZZ HC ADHESIVE SKIN CLOSURE, DERMABOND

## 2022-05-28 PROCEDURE — 85610 PROTHROMBIN TIME: CPT | Performed by: STUDENT IN AN ORGANIZED HEALTH CARE EDUCATION/TRAINING PROGRAM

## 2022-05-28 PROCEDURE — 36569 INSJ PICC 5 YR+ W/O IMAGING: CPT

## 2022-05-28 PROCEDURE — 84484 ASSAY OF TROPONIN QUANT: CPT | Performed by: INTERNAL MEDICINE

## 2022-05-28 PROCEDURE — 272N000019 HC KIT OPEN ENDED DOUBLE LUMEN

## 2022-05-28 PROCEDURE — 272N000451 HC KIT SHRLOCK 5FR POWER PICC DOUBLE LUMEN

## 2022-05-28 PROCEDURE — 99233 SBSQ HOSP IP/OBS HIGH 50: CPT | Mod: GC | Performed by: INTERNAL MEDICINE

## 2022-05-28 PROCEDURE — 250N000013 HC RX MED GY IP 250 OP 250 PS 637

## 2022-05-28 PROCEDURE — 250N000013 HC RX MED GY IP 250 OP 250 PS 637: Performed by: STUDENT IN AN ORGANIZED HEALTH CARE EDUCATION/TRAINING PROGRAM

## 2022-05-28 PROCEDURE — 250N000011 HC RX IP 250 OP 636: Performed by: STUDENT IN AN ORGANIZED HEALTH CARE EDUCATION/TRAINING PROGRAM

## 2022-05-28 PROCEDURE — 36415 COLL VENOUS BLD VENIPUNCTURE: CPT | Performed by: INTERNAL MEDICINE

## 2022-05-28 PROCEDURE — 82805 BLOOD GASES W/O2 SATURATION: CPT | Performed by: INTERNAL MEDICINE

## 2022-05-28 PROCEDURE — 36592 COLLECT BLOOD FROM PICC: CPT | Performed by: STUDENT IN AN ORGANIZED HEALTH CARE EDUCATION/TRAINING PROGRAM

## 2022-05-28 PROCEDURE — 93010 ELECTROCARDIOGRAM REPORT: CPT | Mod: 76 | Performed by: INTERNAL MEDICINE

## 2022-05-28 PROCEDURE — U0005 INFEC AGEN DETEC AMPLI PROBE: HCPCS | Performed by: STUDENT IN AN ORGANIZED HEALTH CARE EDUCATION/TRAINING PROGRAM

## 2022-05-28 PROCEDURE — 93005 ELECTROCARDIOGRAM TRACING: CPT

## 2022-05-28 PROCEDURE — 36592 COLLECT BLOOD FROM PICC: CPT | Performed by: INTERNAL MEDICINE

## 2022-05-28 PROCEDURE — 250N000013 HC RX MED GY IP 250 OP 250 PS 637: Performed by: INTERNAL MEDICINE

## 2022-05-28 RX ORDER — LORAZEPAM 0.5 MG/1
.5-1 TABLET ORAL EVERY 4 HOURS PRN
Status: DISCONTINUED | OUTPATIENT
Start: 2022-05-28 | End: 2022-05-30

## 2022-05-28 RX ORDER — WARFARIN SODIUM 7.5 MG/1
7.5 TABLET ORAL
Status: COMPLETED | OUTPATIENT
Start: 2022-05-28 | End: 2022-05-28

## 2022-05-28 RX ORDER — WARFARIN SODIUM 7.5 MG/1
7.5 TABLET ORAL ONCE
Status: COMPLETED | OUTPATIENT
Start: 2022-05-28 | End: 2022-05-28

## 2022-05-28 RX ORDER — MYCOPHENOLATE MOFETIL 500 MG/1
1500 TABLET ORAL 2 TIMES DAILY
Status: DISCONTINUED | OUTPATIENT
Start: 2022-05-28 | End: 2022-05-31 | Stop reason: HOSPADM

## 2022-05-28 RX ORDER — ZOLPIDEM TARTRATE 5 MG/1
5 TABLET ORAL
Status: DISCONTINUED | OUTPATIENT
Start: 2022-05-28 | End: 2022-05-31 | Stop reason: HOSPADM

## 2022-05-28 RX ORDER — HYDROXYZINE HYDROCHLORIDE 25 MG/1
25 TABLET, FILM COATED ORAL EVERY 6 HOURS PRN
Status: DISCONTINUED | OUTPATIENT
Start: 2022-05-28 | End: 2022-05-30

## 2022-05-28 RX ORDER — LIDOCAINE 40 MG/G
CREAM TOPICAL
Status: DISCONTINUED | OUTPATIENT
Start: 2022-05-28 | End: 2022-05-30

## 2022-05-28 RX ADMIN — FAMOTIDINE 20 MG: 20 TABLET ORAL at 00:29

## 2022-05-28 RX ADMIN — HYDROXYCHLOROQUINE SULFATE 200 MG: 200 TABLET, FILM COATED ORAL at 19:53

## 2022-05-28 RX ADMIN — HYDROXYCHLOROQUINE SULFATE 200 MG: 200 TABLET, FILM COATED ORAL at 00:29

## 2022-05-28 RX ADMIN — HYDROXYCHLOROQUINE SULFATE 200 MG: 200 TABLET, FILM COATED ORAL at 09:39

## 2022-05-28 RX ADMIN — THIAMINE HCL TAB 100 MG 100 MG: 100 TAB at 09:39

## 2022-05-28 RX ADMIN — WARFARIN SODIUM 7.5 MG: 7.5 TABLET ORAL at 01:35

## 2022-05-28 RX ADMIN — TAMSULOSIN HYDROCHLORIDE 0.4 MG: 0.4 CAPSULE ORAL at 09:39

## 2022-05-28 RX ADMIN — TICAGRELOR 90 MG: 90 TABLET ORAL at 19:53

## 2022-05-28 RX ADMIN — Medication 5 MG: at 21:24

## 2022-05-28 RX ADMIN — FAMOTIDINE 20 MG: 20 TABLET ORAL at 19:53

## 2022-05-28 RX ADMIN — ENOXAPARIN SODIUM 70 MG: 80 INJECTION SUBCUTANEOUS at 00:29

## 2022-05-28 RX ADMIN — MYCOPHENOLATE MOFETIL 1500 MG: 500 TABLET, FILM COATED ORAL at 09:39

## 2022-05-28 RX ADMIN — ENOXAPARIN SODIUM 70 MG: 80 INJECTION SUBCUTANEOUS at 23:23

## 2022-05-28 RX ADMIN — ENOXAPARIN SODIUM 70 MG: 80 INJECTION SUBCUTANEOUS at 10:57

## 2022-05-28 RX ADMIN — FAMOTIDINE 20 MG: 20 TABLET ORAL at 09:39

## 2022-05-28 RX ADMIN — ATORVASTATIN CALCIUM 40 MG: 40 TABLET, FILM COATED ORAL at 09:39

## 2022-05-28 RX ADMIN — WARFARIN SODIUM 7.5 MG: 7.5 TABLET ORAL at 18:56

## 2022-05-28 RX ADMIN — TICAGRELOR 90 MG: 90 TABLET ORAL at 00:29

## 2022-05-28 RX ADMIN — MYCOPHENOLATE MOFETIL 1500 MG: 500 TABLET, FILM COATED ORAL at 19:53

## 2022-05-28 RX ADMIN — LORAZEPAM 0.5 MG: 0.5 TABLET ORAL at 23:35

## 2022-05-28 RX ADMIN — TICAGRELOR 90 MG: 90 TABLET ORAL at 09:39

## 2022-05-28 RX ADMIN — ZOLPIDEM TARTRATE 5 MG: 5 TABLET ORAL at 21:23

## 2022-05-28 RX ADMIN — DULOXETINE 30 MG: 30 CAPSULE, DELAYED RELEASE ORAL at 09:39

## 2022-05-28 ASSESSMENT — ACTIVITIES OF DAILY LIVING (ADL)
ADLS_ACUITY_SCORE: 35
WALKING_OR_CLIMBING_STAIRS_DIFFICULTY: NO
ADLS_ACUITY_SCORE: 35
ADLS_ACUITY_SCORE: 35
CHANGE_IN_FUNCTIONAL_STATUS_SINCE_ONSET_OF_CURRENT_ILLNESS/INJURY: NO
ADLS_ACUITY_SCORE: 35
DOING_ERRANDS_INDEPENDENTLY_DIFFICULTY: NO
TOILETING_ISSUES: NO
DIFFICULTY_COMMUNICATING: NO
HEARING_DIFFICULTY_OR_DEAF: NO
CONCENTRATING,_REMEMBERING_OR_MAKING_DECISIONS_DIFFICULTY: NO
ADLS_ACUITY_SCORE: 35
FALL_HISTORY_WITHIN_LAST_SIX_MONTHS: NO
DRESSING/BATHING_DIFFICULTY: NO
WEAR_GLASSES_OR_BLIND: NO
DIFFICULTY_EATING/SWALLOWING: NO
PREVIOUS_RESPONSIBILITIES: MEAL PREP;HOUSEKEEPING;LAUNDRY;SHOPPING;YARDWORK;MEDICATION MANAGEMENT;FINANCES;DRIVING

## 2022-05-28 NOTE — PROVIDER NOTIFICATION
Cards 2 notified of pt's BP's consistently in the 80's/50's with MAPs in the 60's. MD ok with this as long as MAPS remain in the 60's. RN to notify with any changes.

## 2022-05-28 NOTE — PROGRESS NOTES
Admitted/transferred from: direct admit   2 RN full   skin assessment completed by Markus Feliz, RN and Shania WINSTON RN from 6B.  Skin assessment finding: issues found R groin bruise from procedure and dressing in place, blanchable redness on his coccyx/buttocks, bruise on R ac from iv, scabbing on hi neck.    Interventions/actions: skin interventions encouraged to be turned in bed q 2 hrs.      Will continue to monitor.

## 2022-05-28 NOTE — H&P
[facility]    Cardiology History and Physical - Cardiology         Date of Admission:  5/27/2022    Assessment & Plan: NICOLASJUAN Dorman EDUARD Sands is a 61 year old male admitted on 5/27/2022. His PMH is significant for CAD s/p PCI to LAD, ICM, LVEF 30% s/p CRT-D, severe MR, APL with hx of DVT/PE on chronic anticoagulation, HTN, HLD, recent hospitalized [facility] (5/16-5/26) s/p RCA and LAD stenting who returned for inability to eat and was found to be hypotensive at OSH ED.     # CAD s/p PCI to LAD (2005), RCA and LAD (5/24/2022)  # S/p CRT-D (medtronic 2006, gen changed 2012)  # Chronic HFrEF 2/2 ICM with recent worsening EF ( LVEF 10-15%)  # Severe functional MR   # C/f FTT 2/2 advanced HF    # Hypotension   Patient was recently hospitalized [facility] (5/16-5/26) CABG + MVR evaluation, on 5/17 TTE noted with worsening EF (10-15%) with sever dilated LV, surgery was considered high risk and s/p RCA and LAD stenting and medical management recommended. Upon discharge patient was having N/V and decreased oral intake, per son this has gotten worse, pt was not able to eat due to adominal discomfort and vomiting. Abd Xray OSH with nonobstructive bowel gas. Concerning for end stage HF, given his advanced disease there is a possibility he will need advanced therapy. Was placed on ticagrelor and warfarin with Lovenox bridge. Current INR 1.25. Given his hypotension he received ~ 1L IVF at OSH ED c/f volume depletion. Pro BNP 2,000 (down from last), Cr. 0.85, LA 0.7. Euvolemic/hypovolemic on exam, warm to touch. SBP 60-80's OSH, improved s/p IVF. Currently SBP upper 80's, with MAP 64>, Sp02 in higher 90's on RA.    - Hold diuretics for now (Pta lasix 40mg po bid)  - Hold pta Lisinopril due to hypotension   - Continue pta ticagrelor and warfarin with Lovenox bridge (pharmacy to dose Warfarin)  - Continue to hold BB in a setting of low EF  - Consider further optimization of HF with  Aldosterone antagonist and SGLT2I    - Continue pta Thiamine   - Obtain EKG, CXR  - I/O, daily weights  - Monitory daily renal function and electrolytes   - Goal K >4, Mg >2    - NPO for now, consider Cardiac diet with 2g/day sodium restriction  - Consult case management, OT, Nutrition for CHF discharge planning     # Anxiety  #Insomnia  S/p multiple dose of Ativan on route was lethargic at first, easily aroused. Will hold of further sedative medication  -  Hold pta lorazepam, zolpidem  - Continue melatonin, duloxetine   - PRN hydroxyzine     Chronic issues:   # HX of SLE, APL and DVT/PE  - Continue AC as above  - Continue pta hydroxychloroquine 200mg BID  - Will hold MMF (per previous discharge plan), however likely this plan was if surgery were to happen, as CABG was not perform could consider resuming.     # Chronic Anemia  - Hgb on admission 9, (last 8-9) will continue to monitor   - Hold pta Ferrous sulfate for now,   - CBC in AM     # High risk for SUHAIL  # Hx of COVID-19  # Mild to moderate Emphysema  Noted with heavy snoring, was planned for OP sleep study. Former smoker 2ppd for 40 years. On RA currently  - Consider sleep study consult at discharge  - Oxygen supplement as needed    # GERD  - Continue pta famotidine 20mg bid      # BPH  - Continue pta tamsulosin 0.4mg daily    # HLD  - continue pta statin     Diet:     DVT Prophylaxis: Enoxaparin (Lovenox) SQ and Warfarin  Fitzgerald Catheter: Not present  Code Status:   FULL code  Fluids: None  Lines: PIV x1          Disposition Plan   Expected discharge: 2 - 3 days, recommended to transitional care unit once advanced heart failure work-up completed.    Entered: Christine Devlin MD 05/27/2022, 11:15 PM     Patient will be staffed in AM     Christine Devlin MD  [facility]    ______________________________________________________________________    Chief Complaint   Decreased oral intake     History is obtained from the  patient and son       History of Present Illness   Dandy Sands is a 61 year old male admitted on 5/27/2022. His PMH is significant for CAD s/p PCI to LAD, ICM, LVEF 30% s/p CRT-D, severe MR, APL with hx of DVT/PE on chronic anticoagulation, HTN, HLD, recent hospitalized [facility] (5/16-5/26) s/p RCA and LAD stenting who returned for inability to eat and was found to be hypotensive at OSH ED.   Per son, patient was having decreased tolerance eating with nausea and vomiting before discharge yesterday, then worsened today. He was only able to take his evening medication on the day of discharge and patient called son today to let him know he was vomiting. Per patient it was NBNB and reports he has decreased appetite. He reports no abdominal pain at this time, denies abd distention, diarrhea or constipation. He reports not able to sleep since his discharge and currently feeling tired after his benzo medication given during in-route from OSH ED to [facility] ED. Son reports that he has only urinated once today. Patient lives by himself and children 15mins away. He has chronic SOB with exertion but denies any recent worsening dyspnea, PND, orthopnea or peripheral edema. He denies any chest pain, fever or chills.     Review of Systems    The 10 point Review of Systems is negative other than noted in the HPI or here.     Past Medical History    I have reviewed this patient's medical history and updated it with pertinent information if needed.   No past medical history on file.    Past Surgical History   I have reviewed this patient's surgical history and updated it with pertinent information if needed.  Past Surgical History:   Procedure Laterality Date     COLONOSCOPY N/A 5/23/2022    Procedure: COLONOSCOPY;  Surgeon: Parth Meneses MD;  Location: [facility] OR     CV RIGHT HEART CATH MEASUREMENTS RECORDED N/A 5/18/2022    Procedure: Right Heart Catheterization;  Surgeon: Justo Stewart MD;  Location:  HEART CARDIAC CATH LAB      CV RIGHT HEART EXERCISE STRESS STUDY N/A 5/18/2022    Procedure: Stress Drug Study;  Surgeon: Justo Stewart MD;  Location:  HEART CARDIAC CATH LAB       Social History   I have reviewed this patient's social history and updated it with pertinent information if needed.     Family History   I have reviewed this patient's family history and updated it with pertinent information if needed.     No significant family history, including no history of:     Prior to Admission Medications   Prior to Admission Medications   Prescriptions Last Dose Informant Patient Reported? Taking?   DULoxetine (CYMBALTA) 30 MG capsule   No No   Sig: Take 1 capsule (30 mg) by mouth daily   LORazepam (ATIVAN) 0.5 MG tablet   Yes No   Sig: Take 0.5-1 mg by mouth every 4 hours as needed for anxiety   atorvastatin (LIPITOR) 40 MG tablet   Yes No   Sig: Take 40 mg by mouth daily   enoxaparin ANTICOAGULANT (LOVENOX) 80 MG/0.8ML syringe   No No   Sig: Inject 0.7 mLs (70 mg) Subcutaneous every 12 hours Use until your INR is therpautic and your coumadin clnic tells you to stop.   famotidine (PEPCID) 20 MG tablet   Yes No   Sig: Take 20 mg by mouth 2 times daily   ferrous sulfate (FE TABS) 325 (65 Fe) MG EC tablet   Yes No   Sig: Take 325 mg by mouth daily (with lunch)   furosemide (LASIX) 40 MG tablet   Yes No   Sig: Take 40 mg by mouth 2 times daily   hydroxychloroquine (PLAQUENIL) 200 MG tablet   Yes No   Sig: Take 200 mg by mouth 2 times daily   lisinopril (ZESTRIL) 2.5 MG tablet   No No   Sig: Take 1 tablet (2.5 mg) by mouth 2 times daily   melatonin 3 MG tablet   Yes No   Sig: Take 6 mg by mouth nightly as needed for sleep   mycophenolate (GENERIC EQUIVALENT) 500 MG tablet   Yes No   Sig: Take 1,500 mg by mouth 2 times daily   potassium chloride ER (KLOR-CON M) 20 MEQ CR tablet   No No   Sig: Take 1 tablet (20 mEq) by mouth 2 times daily   tamsulosin (FLOMAX) 0.4 MG capsule   Yes No   Sig: Take 0.4 mg by mouth daily   thiamine (B-1) 100 MG  tablet   No No   Sig: Take 1 tablet (100 mg) by mouth daily   ticagrelor (BRILINTA) 90 MG tablet   No No   Sig: Take 1 tablet (90 mg) by mouth 2 times daily   warfarin ANTICOAGULANT (COUMADIN) 5 MG tablet   Yes No   Sig: Take 1 tablet (5 mg) by mouth daily Get an INR on Friday 5/27 and then follow instructions by your coumadin clinic   zolpidem (AMBIEN) 5 MG tablet   Yes No   Sig: Take 5 mg by mouth nightly as needed for sleep      Facility-Administered Medications: None     Allergies   No Known Allergies    Physical Exam   Vital Signs: Temp: 98  F (36.7  C) Temp src: Oral BP: (!) 88/56 Pulse: 85   Resp: 16 SpO2: 98 % O2 Device: None (Room air)    Weight: 0 lbs 0 oz    General: sleepy but easily arousable, lying in bed, NAD  HEENT: Normocephalic. Pupils equal bilaterally. Dry mucous membrane   Lung: Clear to auscultation bilaterally.   CV:  Regular rate and rhythm, no murmurs or rubs, normal S1 and S2.    Abdomen: Bowel sounds present, non-tender to palpation, no distention  Extremities: Radial and DP pulses intact, no edema.   Skin: No jaundice, no rashes or lesions  Neurological: Drowsy but orientated x3 , has intermittent forgetfulness with detailed question, normal speech and mentation, No focal motor or sensory deficits.       Data   Data reviewed today: I reviewed all medications, new labs and imaging results over the last 24 hours. I personally reviewed Cardiology specific data review: no imaging or EKG's to review for today.     Recent Labs   Lab 05/27/22  2215 05/26/22  0840 05/26/22  0501 05/25/22  1733 05/25/22  0637   WBC 5.4 8.3  --   --  5.9   HGB 9.0* 9.8*  --   --  9.5*   MCV 89 88  --   --  92    296  --   --  250   INR 1.25*  --  1.24*  --  1.21*     --  133 134 133   POTASSIUM 3.6  --  4.0 4.2 3.8   CHLORIDE 102  --  100 100 101   CO2 24  --  24 26 22   BUN 12  --  14 14 15   CR 0.85  --  0.94 1.09 0.97   ANIONGAP 8  --  9 8 10   ELDA 8.9  --  9.0 9.1 8.8   GLC 97  --  83 120* 84    ALBUMIN 3.2*  --  3.5 3.5 3.3*   PROTTOTAL 6.9  --  7.5 7.4 7.0   BILITOTAL 0.6  --  0.8 0.7 0.6   ALKPHOS 69  --  79 82 74   ALT 33  --  42 46 47   AST 18  --  29 36 34

## 2022-05-28 NOTE — PLAN OF CARE
Neuro/Pain:  denied pain, A&Ox4.   Activity: SBA/independent  Cardiac/vs: SR w/ AVB and BBB; BP/MAP stable and afebrile.  CVP monitoring with VS.  Zero'd with setup, initial reading @ 5.  Respiratory: RA  GI/: active bowel sound. Last bm today this shift. Voiding spontaneously without difficult.   Diet: Denied nausea/vomiting; tolerated PO pills without issue, no emesis.   Skin: pale, ecchymotic to right groin site from previous LFA angiogram, abdominal bruising from Lovenox.  LDAs: New PICC: double lumen.  X1 setup for CVP monitoring, 2nd site SL.  Labs/Imaging: reviewed.   Change this shift: PICC placed to draw mixed venous Oxyhemoglobin to assess cardiac output - see results.  Awaiting MD plan.    Problem: Cardiac Output Decreased (Heart Failure)  Goal: Optimal Cardiac Output  Outcome: Ongoing, Progressing     Problem: Fluid Imbalance (Heart Failure)  Goal: Fluid Balance  Outcome: Ongoing, Progressing

## 2022-05-28 NOTE — PHARMACY-ANTICOAGULATION SERVICE
Clinical Pharmacy - Warfarin Dosing Consult     Pharmacy has been consulted to manage this patient s warfarin therapy.  Indication: Other - specify in comments (Hx of DVT, SLE, APL, CAD s/p PCI,)  Therapy Goal: INR 2-3  Warfarin Prior to Admission: Yes  Warfarin PTA Regimen: 5mg daily per med rec andc last refill history. 6.5mg daily per Rx anti-caog note from 5/24. Unable to assess with patient.  Dose Comments: Bridging with enoxaparin.    INR   Date Value Ref Range Status   05/27/2022 1.25 (H) 0.85 - 1.15 Final   05/26/2022 1.24 (H) 0.85 - 1.15 Final       Recommend warfarin 7.5 mg today (Late dose for 5/27).  Pharmacy will monitor Dandy Sands daily and order warfarin doses to achieve specified goal.      Please contact pharmacy as soon as possible if the warfarin needs to be held for a procedure or if the warfarin goals change.

## 2022-05-28 NOTE — PROGRESS NOTES
CLINICAL NUTRITION SERVICES    Reason for Assessment:  Low-sodium (2 g/day) nutrition education    Diet History:  Pt reports following a 2 gm Na+ diet at home as able. Patient is interested in an overview of low salt diet.     Nutrition Diagnosis:  Food- and nutrition-related knowledge deficit r/t no recent knowledge of low-sodium diet AEB pt report of no previous formal low-sodium nutrition education.    Nutrition Prescription/Recs:  Continue low-sodium diet.      Interventions:  Nutrition Education  1. Provided verbal instruction on low-sodium meal planning.  2. Provided the following handouts: How to Read Nutrition Labels, Low-Sodium Foods and Drinks, Tips for a Low-Sodium Diet, Seasoning Your Foods Without Adding Salt and low salt recipe booklet.     Goals:    Pt will verbalize at least five high sodium foods and the importance of avoiding added salt to foods for cooking or seasoning foods.     Follow-up:   Patient to ask any further nutrition-related questions before discharge. In addition, pt may request outpatient RD appointment.    Poli Eaton RD/JENARO  Pager [phone]

## 2022-05-28 NOTE — PROCEDURES
[facility]    Double Lumen PICC Placement    Date/Time: 5/28/2022 3:38 PM  Performed by: Cuba Cuenca RN  Authorized by: Justo Stewart MD   Indications: vascular access      UNIVERSAL PROTOCOL   Site Marked: Yes  Prior Images Obtained and Reviewed:  Yes  Required items: Required blood products, implants, devices and special equipment available    Patient identity confirmed:  Verbally with patient and arm band  NA - No sedation, light sedation, or local anesthesia  Confirmation Checklist:  Patient's identity using two indicators, relevant allergies, procedure was appropriate and matched the consent or emergent situation and correct equipment/implants were available  Time out: Immediately prior to the procedure a time out was called    Universal Protocol: the Joint Commission Universal Protocol was followed    Preparation: Patient was prepped and draped in usual sterile fashion       ANESTHESIA    Anesthesia: Local infiltration  Local Anesthetic:  Lidocaine 1% without epinephrine  Anesthetic Total (mL):  5      SEDATION    Patient Sedated: No        Preparation: skin prepped with ChloraPrep  Skin prep agent: skin prep agent completely dried prior to procedure  Sterile barriers: maximum sterile barriers were used: cap, mask, sterile gown, sterile gloves, and large sterile sheet  Hand hygiene: hand hygiene performed prior to central venous catheter insertion  Type of line used: PICC and Power PICC  Catheter type: double lumen  Lumen type: non-valved  Catheter size: 5 Fr  Brand: Bard  Lot number: [id number]  Placement method: venipuncture, MST, ultrasound and tip navigation system  Number of attempts: 1  Difficulty threading catheter: no  Successful placement: yes  Orientation: right    Location: basilic vein (vd 0.63 cm)  Arm circumference: adults 10 cm  Extremity circumference: 27  Visible catheter length: 3  Total catheter length: 40  Dressing and  securement: adhesive securement device, alcohol impregnated caps, blood cleaned with CHG, chlorhexidine disc applied, dressing applied, glue, line secured, securement device, site cleansed, sterile dressing applied and transparent dressing  Post procedure assessment: blood return through all ports, free fluid flow and placement verified by 3CG technology  PROCEDURE   Patient Tolerance:  Patient tolerated the procedure well with no immediate complicationsDescribe Procedure: PICC ok to use

## 2022-05-28 NOTE — PLAN OF CARE
BP 90/66 (BP Location: Right arm)   Pulse 91   Temp 98.2  F (36.8  C) (Oral)   Resp 14   Wt 65.8 kg (145 lb 1 oz)   SpO2 100%   BMI 22.38 kg/m      BP's soft with systolic's 80's-90's. MAPS 60's-70's. See previous MD notification regarding this. Heart rhythm SR with first degree AV block, BBB, and intermittently A, V, and AV paced. Afebrile. Room air. Alert and oriented x 4. Lethargic throughout night. Arouses from sleep easily. Denies pain. NPO for possible procedures today. Denies n/v. No BM overnight. Adequate UOP per urinal. Repositioning self in bed. Up with SBA. Left PIV SL'd. Continue to monitor.

## 2022-05-28 NOTE — PROGRESS NOTES
[facility]    Cardiology Progress Note- Cards 2        Date of Admission:  5/27/2022     Assessment & Plan: MARISOL Dorman EDUARD Sands is a 61 year old male with history of CAD s/p PCI to RCA, LAD 5/24/22, ICM LVEF 20% s/p CRT-D, severe MR, APL with hx of DVT/PE on chronic anticoagulation with warfarin, hx of COVID-19 1/2022 (was hospitalized, not intubated), HTN, and HLD, moderate-severe functional mitral regurgitation admitted 5/27 for hypotension, nausea and vomiting.     # Nausea/vomiting  # Hypotension   Some nausea on day of discharge which worsened after he left. Pt reports taking all AM meds together 5/27 which seemed to make things worse, new onset abdominal pain as well. Dry heaving between meals and nonbloody emesis after any PO intake. Hypotensive with MAPS 50s at OSH. S/p 1L IV fluids. Abd Xray OSH with nonobstructive bowel gas. Concerning for cardiogenic shock vs in-stent thrombosis vs iatrogenic hypotension vs other.   - PICC with VBG to look at cardiac index (last was 2.0)  - Hold lasix, lisinopril for now  - Symptomatic treatment PRN    # Acute on chronic systolic heart failure/HFrEF secondary to ICM (LVEF 10-20%)    Stage D. NYHA Class III.   # Severe Functional MR  # Dilated LV (LVIDd 6.1 cm)  Transferred from [facility] in [place] 5/16/2022 for evaluation for CABG + MVR at a center with ECMO/VAD as a back up. However at the OSH, his EF was reported 30-35%, and EF here is 10-20% (on TTE and CMRI) with severely dilated LV. CMRI showing infarcted myocardium in LAD territory. Given the degree of LV dysfunction/dilation, moderate to severe functional MR and lack of viable myocardium, cardiac surgery would be high risk. Opted instead for RCA and LAD stenting and medical management (see below). Medical therapy was optimized as outlined below prior to discharge and plan is to monitor any improvement after revascularization with hopes of increasing  medical therapy as outpatient. However, given his advanced disease, there is a possibility that he will need advanced therapies. This evaluation was mostly completed while inpatient last admission, but he has not yet been presented, nor approved.     On discharge he had systolic blood pressures in the 90s with very rare values in the 80s with recovery to the 90s without intervention. CASEY after about 200 feet, overall stable/improving. No lightheadedness. Tolerating PO intake. Did have some nausea, which worsened after discharged. Also with hypotension at the outside hospital.     - Fluid status: euvolemic, lasix TBD pending diet/intake  - ACEi/ARB/ARNI/afterload reduction:Hold PTA lisinopril 2.5 mg BID for hypotension  - BB: Holding in the setting of cardiogenic shock  - Aldosterone antagonist: deferred while other medical therapy is prioritized  - SGLT2i: deferred while other medical therapy is prioritized  - SCD prophylaxis: CRT-D  -- Appears that his device is NOT set to pace for the prior 2 years  - Plan to repeat RHC in 2-3 months to assess elevated PVR (last was 5.2 KNOWLES)  - Prior plan was to establish care with [facility] in next 1-2 weeks for local monitoring then establish outpatient care with Dr Stewart in June     LVAD/Transplant Evaluation Checklist  [x]????? labs (CBC, CMP, PT/INR, cystatin C, prealbumin, UA + micro)  [x]????? Infectious (Hep A/B/C, HIV, treponema, HSV 1/2 IgG, CMV IgG, Toxo IgG, EBV IgG, varicella IgG, Quant gold, COVID vaccine/PCR)  [x]????? Utox/nicotine and cotinine/PeTH   []????? Immunocompatibility (last transfusion, ABO, HLA tissue typing, PRA)  [x]????? ECG, Echo  []????? CPX - can be oupatient  []????? 6MWT - can be outpatient  [x]????? RHC, completed but needs repeat in 2-3 months to assess PVR  [x]????? CVTS consult  [x]????? Social work consult  [x]?????  Palliative care consult: final note pending  [x]????? Neuropsych consult: order placed: final note  pending  [x]????? Nutrition consult  [x]????? CT Dental   [x]????? Dental consult  [x]????? Abd US + doppler  - Abd US complete done, not with doppler; OK given unremarkable CT CAP  [x]????? Extremity US and ABIs  [x]????? Carotid US (if DM or ICM or >51yo)  []????? PFTs: outpatient  []????? Dexa: outpatient  [x]????? CT head non-contrast  [x]????? CT CAP non-contrast  [x]????? Colonoscopy (>51 yo)  [x]????? PSA/HCG    # Troponin elevation  # Myocardial injury  Troponin 5k on transfer, repeat downtrending. Likely myocardial injury in setting of hypotension, recent PCI, possible cardiogenic shock.  - Investigate as above     # CAD s/p PCI to LAD (2005)  # Hx of MI (2007)  # Severe ostial LAD disease with in-stent restenosis of mid LAD at diag bifurcation, severe proximal RCA disease, mild circ disease now s/p ostial LAD and proximal RCA lithotripsy and stenting on 5/24/22  # S/p CRT-D ([employer] 2006, gen change 2012)  As above, patient was transferred last admission for consideration of CABG. CMRI showing infarcted myocardium in LAD territory. Given the degree of LV dysfunction/dilation, moderate to severe functional MR and lack of viable myocardium, cardiac surgery would be high risk. Now s/p ostial LAD and proximal RCA lithotripsy and stenting on 5/24/22.   - Brilinta and Coumadin (Lovenox bridge to therapeutic INR)  - No ASA given his need for Coumadin  - Not on BB d/t low output      # SLE  # APL  # Hx of DVT and PE  He reports that his main symptom of lupus is diffuse joint pain,  tolerable with Tylenol. Coumadin was held for procedures last admission, currently bridging with lovenox. If anticipate surgery in future, would plan to hold MMF 1 week prior to surgery, re-starting 5-7 days after surgery in absence of surgical site infection, poor wound healing or infection elsewhere.   - Continue lovenox bridge to therapeutic INR with warfarin  - Continue pta hydroxychloroquine 200mg bid   - PTA mycophenolic acid  1500mg q12h     # SVT  # Nonsustained VT  SVT noted 5/21 with rates up to 165bpm self terminating after up to 40 secs, 3 episodes at 4pm, hemodynamically stable. Has continued to have very rare NSVT, but overall minimal.      # Anemia, chronic  - Continue pta ferrous sulfate 325mg daily      # High risk for SUHAIL  Pt had plan for outpatient sleep study, missed due to this hospitalization. Will consider as outpatient.      # Severe malnutrition in the context of chronic illness  Supplements to help maintain nutrition.      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 Will hold of further sedative medication  - PTA lorazepam 0.5-1 mg q4h PRN  - PTA zolpidem 5mg at bedtime prn  - Hydroxyzine prn for anxiety  - Melatonin scheduled   - Duloxetine 30mg     # Mild-Moderate Emphysema  # Hx of COVID-19  Had COVID ~1/2022, was hospitalized for ~6 days, was on BiPap intermittently, was not intubated. Currently no issues. Former smoker 2 ppd for 40 years quit on 09/09/09.       # GERD  - Continue pta famotidine 20mg bid      # BPH  - Continue pta tamsulosin 0.4mg daily     Diet:  NPO at this time  DVT Prophylaxis: lovenox bridging to warfarin  Fitzgerald Catheter: Not present  Code Status:   FULL      Disposition Plan   Expected discharge: 2 - 3 days, recommended to prior living arrangement once investigation for shock completed, volume status stable on PO regimen.    Entered: Cindy Hall MD 05/28/2022, 6:41 AM     The patient's care was discussed with the Attending Physician, Dr. Stewart.    Cindy Hall MD  [facility]    ______________________________________________________________________    Interval History   NAEO. Tolerated food well without nausea, vomiting last night. Feeling better today, but still with some nausea. Denies current abdominal pain, headache, fevers/chills, chest pain. No  dizziness currently but does still have light-headedness with position changes occasionally. 2 watery bowel movements yesterday, nonbloody.     Data reviewed today: I reviewed all medications, new labs and imaging results over the last 24 hours. I personally reviewed the EKG tracing showing first degree AV block, interventricular conduction delay, similar to prior; no new ST/T changes     Physical Exam   Vital Signs: Temp: 98.2  F (36.8  C) Temp src: Oral BP: 90/66 Pulse: 91   Resp: 14 SpO2: 100 % O2 Device: None (Room air)    Weight: 145 lbs 1 oz  General Appearance: Ill-appearing male, no acute distress  Respiratory: Mildly increased work of breathing;   Cardiovascular: Borderline tachycardic; 3/6 systolic murmur heard best at apex; JVP est 10. No lower extremity edema  GI: Abdomen soft, nontender; bowel sounds present  Skin: Generalized pallor; no rashes appreciated on exposed skin  Other: Alert, oriented, interactive     Data   Recent Labs   Lab 05/28/22  0540 05/27/22  2215 05/26/22  0840 05/26/22  0501 05/25/22  1733 05/25/22  0637   WBC  --  5.4 8.3  --   --  5.9   HGB  --  9.0* 9.8*  --   --  9.5*   MCV  --  89 88  --   --  92   PLT  --  300 296  --   --  250   INR 1.24* 1.25*  --  1.24*  --  1.21*   NA  --  134  --  133 134 133   POTASSIUM  --  3.6  --  4.0 4.2 3.8   CHLORIDE  --  102  --  100 100 101   CO2  --  24  --  24 26 22   BUN  --  12  --  14 14 15   CR  --  0.85  --  0.94 1.09 0.97   ANIONGAP  --  8  --  9 8 10   ELDA  --  8.9  --  9.0 9.1 8.8   GLC  --  97  --  83 120* 84   ALBUMIN  --  3.2*  --  3.5 3.5 3.3*   PROTTOTAL  --  6.9  --  7.5 7.4 7.0   BILITOTAL  --  0.6  --  0.8 0.7 0.6   ALKPHOS  --  69  --  79 82 74   ALT  --  33  --  42 46 47   AST  --  18  --  29 36 34     No results found for this or any previous visit (from the past 24 hour(s)).

## 2022-05-28 NOTE — PROGRESS NOTES
"   05/28/22 0934   Quick Adds   Type of Visit Initial Occupational Therapy Evaluation       Present no   Living Environment   People in Home alone   Current Living Arrangements house   Home Accessibility stairs to enter home   Number of Stairs, Main Entrance 3   Stair Railings, Main Entrance none   Transportation Anticipated car, drives self;family or friend will provide   Living Environment Comments Pt lives alone in single level home, ~3 THANG, tub/shower unit, no grab bars/shower chair, standard height toilet   Self-Care   Usual Activity Tolerance fair   Current Activity Tolerance fair   Regular Exercise Yes   Activity/Exercise Type biking   Exercise Amount/Frequency 2 times/wk;other (see comments)  (enjoys bike riding; 2/2 recent decline med status has not been able to bike.)   Equipment Currently Used at Home none   Fall history within last six months no   Activity/Exercise/Self-Care Comment At baseline, pt reports ADL IND, does not utilize AD for functional mobility; Pt expresses 2/2 recent decline in medical status, daily self-cares and functional mobility \"have felt more exerting.\"   Instrumental Activities of Daily Living (IADL)   Previous Responsibilities meal prep;housekeeping;laundry;shopping;yardwork;medication management;finances;driving   IADL Comments Pt reports being IND w/ IADL completion at baseline, states family/friends live within 20 miles and are able to assist if needed   General Information   Onset of Illness/Injury or Date of Surgery 05/27/22   Referring Physician Christine Devlin MD   Patient/Family Therapy Goal Statement (OT) Return home   Additional Occupational Profile Info/Pertinent History of Current Problem Per EMR, \" Dandy Sands is a 61 year old male admitted on 5/27/2022. His PMH is significant for CAD s/p PCI to LAD, ICM, LVEF 30% s/p CRT-D, severe MR, APL with hx of DVT/PE on chronic anticoagulation, HTN, HLD, recent hospitalized [facility] (5/16-5/26) s/p RCA " "and LAD stenting who returned for inability to eat and was found to be hypotensive at OSH ED.   Per son, patient was having decreased tolerance eating with nausea and vomiting before discharge yesterday, then worsened today. He was only able to take his evening medication on the day of discharge and patient called son today to let him know he was vomiting. Per patient it was NBNB and reports he has decreased appetite. He reports no abdominal pain at this time, denies abd distention, diarrhea or constipation. He reports not able to sleep since his discharge and currently feeling tired after his benzo medication given during in-route from OSH ED to [facility] ED. Son reports that he has only urinated once today. Patient lives by himself and children 15mins away. He has chronic SOB with exertion but denies any recent worsening dyspnea, PND, orthopnea or peripheral edema. He denies any chest pain, fever or chills.\"   Existing Precautions/Restrictions cardiac   Limitations/Impairments safety/cognitive   Left Upper Extremity (Weight-bearing Status) full weight-bearing (FWB)   Right Upper Extremity (Weight-bearing Status) full weight-bearing (FWB)   Left Lower Extremity (Weight-bearing Status) full weight-bearing (FWB)   Right Lower Extremity (Weight-bearing Status) full weight-bearing (FWB)   General Observations and Info OT Consult: Heart failure (acute or chronic)   Cognitive Status Examination   Orientation Status orientation to person, place and time   Cognitive Status Comments Pt presents Ox4, slightly forgetful and presents with bed alarm on; son at bedside to assist in answering medical questions when RN at bedside   Visual Perception   Visual Impairment/Limitations WFL;corrective lenses for reading   Impact of Vision Impairment on Function (Vision) Denies acute vision changes   Sensory   Sensory Quick Adds No deficits were identified   Pain Assessment   Patient Currently in Pain No   Integumentary/Edema "   Integumentary/Edema no deficits were identifed   Posture   Posture forward head position   Posture Comments while seated at EOB, able to self-correct with min verbal cues   Range of Motion Comprehensive   General Range of Motion bilateral upper extremity ROM WFL   Comment, General Range of Motion B UE ROM WFL during ADL tasks   Strength Comprehensive (MMT)   General Manual Muscle Testing (MMT) Assessment no strength deficits identified   Comment, General Manual Muscle Testing (MMT) Assessment B UE strength WFL during functional transfers, formal MMT not assessed   Muscle Tone Assessment   Muscle Tone Quick Adds No deficits were identified   Coordination   Upper Extremity Coordination No deficits were identified   Bed Mobility   Bed Mobility supine-sit   Supine-Sit [place] (Bed Mobility) supervision   Assistive Device (Bed Mobility) bed rails   Comment (Bed Mobility) supervision supine<>seated EOB, HOB slightly elevated   Transfers   Transfers sit-stand transfer;toilet transfer   Sit-Stand Transfer   Sit/Stand Transfer Comments SBA STS from EOB, no AD   Toilet Transfer   Type (Toilet Transfer) sit-stand   Toilet Transfer Comments SBA STS from toilet with unilateral UE support on grab bar   Balance   Balance Assessment standing balance: dynamic   Activities of Daily Living   BADL Assessment/Intervention lower body dressing;upper body dressing;grooming   Upper Body Dressing Assessment/Training   Position (Upper Body Dressing) edge of bed sitting   Comment, (Upper Body Dressing) SBA and set-up UB dressing while seated at EOB   [place] Level (Upper Body Dressing) set up   Lower Body Dressing Assessment/Training   Position (Lower Body Dressing) edge of bed sitting   Comment, (Lower Body Dressing) SBA and set-up to don bilateral shoes while seated at EOB   [place] Level (Lower Body Dressing) set up   Grooming Assessment/Training   [place] Level (Grooming) set up   Comment, (Grooming) SBA and set-up to  complete standing g/h tasks sink side   Clinical Impression   Criteria for Skilled Therapeutic Interventions Met (OT) Yes, treatment indicated   OT Diagnosis Decreased functional endurance and activity tolerance impacting ADL/IADL I/safety   OT Problem List-Impairments impacting ADL problems related to;activity tolerance impaired;cognition;mobility;range of motion (ROM);strength   Assessment of Occupational Performance 3-5 Performance Deficits   Identified Performance Deficits FB dressing, FB bathing, functional mobility, endurance, activity tolerance, home mgmt, IADL tasks, leisure/exercise   Planned Therapy Interventions (OT) ADL retraining;IADL retraining;cognition;ROM;strengthening;transfer training;home program guidelines;progressive activity/exercise   Clinical Decision Making Complexity (OT) low complexity   Anticipated Equipment Needs Upon Discharge (OT) shower chair;other (see comments)  (TBD)   Risk & Benefits of therapy have been explained evaluation/treatment results reviewed;care plan/treatment goals reviewed;participants included;patient;son   Clinical Impression Comments Pt mostly limited by decreased functional endurance and activity tolerance impacting ADL/IADL tasks; Pt will benefit from skilled OT services during IP stay to progress ADL/IADL I and support safe discharge plan   OT Discharge Planning   OT Discharge Recommendation (DC Rec) home with assist;home with outpatient cardiac rehab   OT Rationale for DC Rec Pt currently below baseline, limited by deconditioning and decreased functional endurance. Pt undergoing work up for for LVAD/transplant, timeline unknown. Currently, recommending home with assist from family prn and OP CR to progress cardiopulmonary function and ADL/IADL I/safety. Will continue to monitor and assess status/POC if surgical intervention is indicated. Recommend OP CR   OT Brief overview of current status A x 1, no AD   Total Evaluation Time (Minutes)   Total Evaluation Time  (Minutes) 4   OT Goals   Therapy Frequency (OT) 3 times/wk   OT Predicted Duration/Target Date for Goal Attainment 06/05/22   OT Goals Home Management;Cognition;Cardiac Phase 1;OT Goal 1   OT: Home Management Modified independent;with moderate demand household tasks;ambulatory level   OT: Cognitive Patient/caregiver will verbalize understanding of cognitive assessment results/recommendations as needed for safe discharge planning   OT: Understanding of cardiac education to maximize quality of life, condition management, and health outcomes Patient;Verbalize;Demonstrate   OT: Perform aerobic activity with stable cardiovascular response continuous;10 minutes;ambulation;NuStep   OT: Functional/aerobic ambulation tolerance with stable cardiovascular response in order to return to home and community environment Modified independent;Greater than 300 feet   OT: Navigation of stairs simulating home set up with stable cardiovascular response in order to return to home and community environment Modified independent;3 stairs   OT: Goal 1 Pt will be able to verbalize ~3 EC/WS strategies to utilize in the home environment prior to discharge.

## 2022-05-28 NOTE — PLAN OF CARE
Goal Outcome Evaluation:    BP (!) 88/56 (BP Location: Right arm, Patient Position: Supine, Cuff Size: Adult Regular)   Pulse 85   Temp 98  F (36.7  C) (Oral)   Resp 16   SpO2 98%     [id number]    Admitted at around 2000 to  room 31-2. Oriented to unit, room, call light, tv and light control, dietary hours and men. Insisted safety measures. Bed alarm turned on.    Direct admit from [place] by flight. Patient was discharged yesterday from . He is on LVAD/transplant work up. Lethargic, rambling sometimes for speech; he was given Ativan 3 mg total on his way. Patient's son at bedside reported that pt was having hallucination. Oriented x 4, slow to respond. Bed alarm turned on. PIV came with saline locked. 2g Na+ diet. Will be NPO after midnight.

## 2022-05-29 ENCOUNTER — HEALTH MAINTENANCE LETTER (OUTPATIENT)
Age: 62
End: 2022-05-29

## 2022-05-29 LAB
ALBUMIN SERPL-MCNC: 3 G/DL (ref 3.4–5)
ALP SERPL-CCNC: 71 U/L (ref 40–150)
ALT SERPL W P-5'-P-CCNC: 30 U/L (ref 0–70)
ANION GAP SERPL CALCULATED.3IONS-SCNC: 10 MMOL/L (ref 3–14)
AST SERPL W P-5'-P-CCNC: 16 U/L (ref 0–45)
BASE EXCESS BLDV CALC-SCNC: -1.4 MMOL/L (ref -7.7–1.9)
BASE EXCESS BLDV CALC-SCNC: 0 MMOL/L (ref -7.7–1.9)
BILIRUB SERPL-MCNC: 0.7 MG/DL (ref 0.2–1.3)
BUN SERPL-MCNC: 11 MG/DL (ref 7–30)
CALCIUM SERPL-MCNC: 9 MG/DL (ref 8.5–10.1)
CHLORIDE BLD-SCNC: 102 MMOL/L (ref 94–109)
CO2 SERPL-SCNC: 22 MMOL/L (ref 20–32)
CREAT SERPL-MCNC: 0.86 MG/DL (ref 0.66–1.25)
ERYTHROCYTE [DISTWIDTH] IN BLOOD BY AUTOMATED COUNT: 17.1 % (ref 10–15)
GFR SERPL CREATININE-BSD FRML MDRD: >90 ML/MIN/1.73M2
GLUCOSE BLD-MCNC: 103 MG/DL (ref 70–99)
HCO3 BLDV-SCNC: 23 MMOL/L (ref 21–28)
HCO3 BLDV-SCNC: 24 MMOL/L (ref 21–28)
HCT VFR BLD AUTO: 28.7 % (ref 40–53)
HGB BLD-MCNC: 8.9 G/DL (ref 13.3–17.7)
HGB BLD-MCNC: 9 G/DL (ref 13.3–17.7)
HGB BLD-MCNC: 9.2 G/DL (ref 13.3–17.7)
INR PPP: 1.59 (ref 0.85–1.15)
MCH RBC QN AUTO: 28.1 PG (ref 26.5–33)
MCHC RBC AUTO-ENTMCNC: 31.4 G/DL (ref 31.5–36.5)
MCV RBC AUTO: 90 FL (ref 78–100)
O2/TOTAL GAS SETTING VFR VENT: 0 %
O2/TOTAL GAS SETTING VFR VENT: 0 %
OXYHGB MFR BLDA: 99 % (ref 92–100)
OXYHGB MFR BLDV: 40 % (ref 70–75)
OXYHGB MFR BLDV: 41 % (ref 70–75)
OXYHGB MFR BLDV: 50 % (ref 92–100)
PCO2 BLDV: 35 MM HG (ref 40–50)
PCO2 BLDV: 37 MM HG (ref 40–50)
PH BLDV: 7.42 [PH] (ref 7.32–7.43)
PH BLDV: 7.42 [PH] (ref 7.32–7.43)
PLATELET # BLD AUTO: 323 10E3/UL (ref 150–450)
PO2 BLDV: 25 MM HG (ref 25–47)
PO2 BLDV: 26 MM HG (ref 25–47)
POTASSIUM BLD-SCNC: 3.5 MMOL/L (ref 3.4–5.3)
PROT SERPL-MCNC: 6.6 G/DL (ref 6.8–8.8)
RBC # BLD AUTO: 3.2 10E6/UL (ref 4.4–5.9)
SODIUM SERPL-SCNC: 134 MMOL/L (ref 133–144)
TROPONIN I SERPL HS-MCNC: 5170 NG/L
WBC # BLD AUTO: 4.7 10E3/UL (ref 4–11)

## 2022-05-29 PROCEDURE — 85610 PROTHROMBIN TIME: CPT | Performed by: STUDENT IN AN ORGANIZED HEALTH CARE EDUCATION/TRAINING PROGRAM

## 2022-05-29 PROCEDURE — 82810 BLOOD GASES O2 SAT ONLY: CPT

## 2022-05-29 PROCEDURE — 272N000001 HC OR GENERAL SUPPLY STERILE: Performed by: INTERNAL MEDICINE

## 2022-05-29 PROCEDURE — 93456 R HRT CORONARY ARTERY ANGIO: CPT | Mod: 26 | Performed by: INTERNAL MEDICINE

## 2022-05-29 PROCEDURE — 80053 COMPREHEN METABOLIC PANEL: CPT | Performed by: STUDENT IN AN ORGANIZED HEALTH CARE EDUCATION/TRAINING PROGRAM

## 2022-05-29 PROCEDURE — C1894 INTRO/SHEATH, NON-LASER: HCPCS | Performed by: INTERNAL MEDICINE

## 2022-05-29 PROCEDURE — 250N000009 HC RX 250: Performed by: INTERNAL MEDICINE

## 2022-05-29 PROCEDURE — 250N000011 HC RX IP 250 OP 636: Performed by: INTERNAL MEDICINE

## 2022-05-29 PROCEDURE — 82805 BLOOD GASES W/O2 SATURATION: CPT | Performed by: STUDENT IN AN ORGANIZED HEALTH CARE EDUCATION/TRAINING PROGRAM

## 2022-05-29 PROCEDURE — 250N000012 HC RX MED GY IP 250 OP 636 PS 637: Performed by: STUDENT IN AN ORGANIZED HEALTH CARE EDUCATION/TRAINING PROGRAM

## 2022-05-29 PROCEDURE — 85027 COMPLETE CBC AUTOMATED: CPT | Performed by: STUDENT IN AN ORGANIZED HEALTH CARE EDUCATION/TRAINING PROGRAM

## 2022-05-29 PROCEDURE — 4A023N6 MEASUREMENT OF CARDIAC SAMPLING AND PRESSURE, RIGHT HEART, PERCUTANEOUS APPROACH: ICD-10-PCS | Performed by: INTERNAL MEDICINE

## 2022-05-29 PROCEDURE — 250N000013 HC RX MED GY IP 250 OP 250 PS 637: Performed by: STUDENT IN AN ORGANIZED HEALTH CARE EDUCATION/TRAINING PROGRAM

## 2022-05-29 PROCEDURE — C1769 GUIDE WIRE: HCPCS | Performed by: INTERNAL MEDICINE

## 2022-05-29 PROCEDURE — 99233 SBSQ HOSP IP/OBS HIGH 50: CPT | Mod: 25 | Performed by: INTERNAL MEDICINE

## 2022-05-29 PROCEDURE — 36592 COLLECT BLOOD FROM PICC: CPT | Performed by: INTERNAL MEDICINE

## 2022-05-29 PROCEDURE — 250N000011 HC RX IP 250 OP 636: Performed by: STUDENT IN AN ORGANIZED HEALTH CARE EDUCATION/TRAINING PROGRAM

## 2022-05-29 PROCEDURE — 99153 MOD SED SAME PHYS/QHP EA: CPT | Performed by: INTERNAL MEDICINE

## 2022-05-29 PROCEDURE — 250N000013 HC RX MED GY IP 250 OP 250 PS 637: Performed by: INTERNAL MEDICINE

## 2022-05-29 PROCEDURE — 93456 R HRT CORONARY ARTERY ANGIO: CPT | Performed by: INTERNAL MEDICINE

## 2022-05-29 PROCEDURE — B2111ZZ FLUOROSCOPY OF MULTIPLE CORONARY ARTERIES USING LOW OSMOLAR CONTRAST: ICD-10-PCS | Performed by: INTERNAL MEDICINE

## 2022-05-29 PROCEDURE — 120N000003 HC R&B IMCU UMMC

## 2022-05-29 PROCEDURE — 36592 COLLECT BLOOD FROM PICC: CPT | Performed by: STUDENT IN AN ORGANIZED HEALTH CARE EDUCATION/TRAINING PROGRAM

## 2022-05-29 PROCEDURE — 93451 RIGHT HEART CATH: CPT | Mod: 26 | Performed by: INTERNAL MEDICINE

## 2022-05-29 PROCEDURE — 99152 MOD SED SAME PHYS/QHP 5/>YRS: CPT | Performed by: INTERNAL MEDICINE

## 2022-05-29 PROCEDURE — 84484 ASSAY OF TROPONIN QUANT: CPT | Performed by: INTERNAL MEDICINE

## 2022-05-29 PROCEDURE — 85018 HEMOGLOBIN: CPT

## 2022-05-29 PROCEDURE — 82805 BLOOD GASES W/O2 SATURATION: CPT | Performed by: INTERNAL MEDICINE

## 2022-05-29 RX ORDER — POTASSIUM CHLORIDE 750 MG/1
40 TABLET, EXTENDED RELEASE ORAL ONCE
Status: COMPLETED | OUTPATIENT
Start: 2022-05-29 | End: 2022-05-29

## 2022-05-29 RX ORDER — FENTANYL CITRATE 50 UG/ML
INJECTION, SOLUTION INTRAMUSCULAR; INTRAVENOUS
Status: DISCONTINUED | OUTPATIENT
Start: 2022-05-29 | End: 2022-05-29 | Stop reason: HOSPADM

## 2022-05-29 RX ORDER — CLOPIDOGREL BISULFATE 75 MG/1
300 TABLET ORAL ONCE
Status: COMPLETED | OUTPATIENT
Start: 2022-05-29 | End: 2022-05-29

## 2022-05-29 RX ORDER — WARFARIN SODIUM 7.5 MG/1
7.5 TABLET ORAL
Status: COMPLETED | OUTPATIENT
Start: 2022-05-29 | End: 2022-05-29

## 2022-05-29 RX ORDER — FUROSEMIDE 40 MG
40 TABLET ORAL DAILY
Status: DISCONTINUED | OUTPATIENT
Start: 2022-05-29 | End: 2022-05-31 | Stop reason: HOSPADM

## 2022-05-29 RX ORDER — HEPARIN SODIUM 1000 [USP'U]/ML
INJECTION, SOLUTION INTRAVENOUS; SUBCUTANEOUS
Status: DISCONTINUED | OUTPATIENT
Start: 2022-05-29 | End: 2022-05-29 | Stop reason: HOSPADM

## 2022-05-29 RX ORDER — IOPAMIDOL 755 MG/ML
INJECTION, SOLUTION INTRAVASCULAR
Status: DISCONTINUED | OUTPATIENT
Start: 2022-05-29 | End: 2022-05-29 | Stop reason: HOSPADM

## 2022-05-29 RX ORDER — NICARDIPINE HYDROCHLORIDE 2.5 MG/ML
INJECTION INTRAVENOUS
Status: DISCONTINUED | OUTPATIENT
Start: 2022-05-29 | End: 2022-05-29 | Stop reason: HOSPADM

## 2022-05-29 RX ORDER — CLOPIDOGREL BISULFATE 75 MG/1
75 TABLET ORAL DAILY
Status: DISCONTINUED | OUTPATIENT
Start: 2022-05-30 | End: 2022-05-31 | Stop reason: HOSPADM

## 2022-05-29 RX ORDER — NITROGLYCERIN 5 MG/ML
VIAL (ML) INTRAVENOUS
Status: DISCONTINUED | OUTPATIENT
Start: 2022-05-29 | End: 2022-05-29 | Stop reason: HOSPADM

## 2022-05-29 RX ADMIN — THIAMINE HCL TAB 100 MG 100 MG: 100 TAB at 09:53

## 2022-05-29 RX ADMIN — Medication 5 MG: at 22:14

## 2022-05-29 RX ADMIN — Medication 6 MG: at 22:13

## 2022-05-29 RX ADMIN — TAMSULOSIN HYDROCHLORIDE 0.4 MG: 0.4 CAPSULE ORAL at 09:54

## 2022-05-29 RX ADMIN — HYDROXYCHLOROQUINE SULFATE 200 MG: 200 TABLET, FILM COATED ORAL at 09:53

## 2022-05-29 RX ADMIN — CLOPIDOGREL BISULFATE 300 MG: 75 TABLET ORAL at 18:37

## 2022-05-29 RX ADMIN — DULOXETINE 30 MG: 30 CAPSULE, DELAYED RELEASE ORAL at 09:53

## 2022-05-29 RX ADMIN — HYDROXYCHLOROQUINE SULFATE 200 MG: 200 TABLET, FILM COATED ORAL at 20:48

## 2022-05-29 RX ADMIN — TICAGRELOR 90 MG: 90 TABLET ORAL at 09:53

## 2022-05-29 RX ADMIN — LORAZEPAM 0.5 MG: 0.5 TABLET ORAL at 23:20

## 2022-05-29 RX ADMIN — ATORVASTATIN CALCIUM 40 MG: 40 TABLET, FILM COATED ORAL at 09:53

## 2022-05-29 RX ADMIN — WARFARIN SODIUM 7.5 MG: 7.5 TABLET ORAL at 18:37

## 2022-05-29 RX ADMIN — POTASSIUM CHLORIDE 40 MEQ: 750 TABLET, EXTENDED RELEASE ORAL at 11:47

## 2022-05-29 RX ADMIN — ENOXAPARIN SODIUM 70 MG: 80 INJECTION SUBCUTANEOUS at 22:17

## 2022-05-29 RX ADMIN — HYDROXYZINE HYDROCHLORIDE 25 MG: 25 TABLET, FILM COATED ORAL at 23:20

## 2022-05-29 RX ADMIN — HYDROXYZINE HYDROCHLORIDE 25 MG: 25 TABLET, FILM COATED ORAL at 03:44

## 2022-05-29 RX ADMIN — MYCOPHENOLATE MOFETIL 1500 MG: 500 TABLET, FILM COATED ORAL at 09:51

## 2022-05-29 RX ADMIN — FAMOTIDINE 20 MG: 20 TABLET ORAL at 09:54

## 2022-05-29 RX ADMIN — FUROSEMIDE 40 MG: 40 TABLET ORAL at 18:48

## 2022-05-29 RX ADMIN — FAMOTIDINE 20 MG: 20 TABLET ORAL at 20:48

## 2022-05-29 RX ADMIN — MYCOPHENOLATE MOFETIL 1500 MG: 500 TABLET, FILM COATED ORAL at 20:47

## 2022-05-29 RX ADMIN — ENOXAPARIN SODIUM 70 MG: 80 INJECTION SUBCUTANEOUS at 11:47

## 2022-05-29 ASSESSMENT — ACTIVITIES OF DAILY LIVING (ADL)
ADLS_ACUITY_SCORE: 22
ADLS_ACUITY_SCORE: 26
ADLS_ACUITY_SCORE: 22
ADLS_ACUITY_SCORE: 26
ADLS_ACUITY_SCORE: 22
ADLS_ACUITY_SCORE: 26
ADLS_ACUITY_SCORE: 22
ADLS_ACUITY_SCORE: 26
ADLS_ACUITY_SCORE: 35
ADLS_ACUITY_SCORE: 22

## 2022-05-29 NOTE — PROVIDER NOTIFICATION
Time of notification: 10:33 AM  Provider notified: Dr. Cindy Hall  Reason for notification: Resulted Oxyhemoglobin 41. Family also at bedside with questions regarding plan & Oxyhemoglobin levels, ETA for rounds?  Temp:  [97.1  F (36.2  C)-98.1  F (36.7  C)] 97.1  F (36.2  C)  Pulse:  [24-92] (P) 24  Resp:  [16-18] 18  BP: (82-98)/(55-71) 92/63  SpO2:  [90 %-99 %] 90 %  Orders received: Orders placed for re-draw Oxyhemoglobin for 12pm, ETA unknown at this time.

## 2022-05-29 NOTE — PLAN OF CARE
Neuro: A&Ox4.   Cardiac: SR w/ 1st degree AV block & RBBB. VSS.   Respiratory: Sats stable on RA.  GI/: Adequate urine output. No BM this shift.   Diet/appetite: Tolerating low NA diet.   Activity:  SBA up in room.   Pain: Denies.   Skin: No new deficits noted.  LDA's: R PICC. L PIV.     Plan: Continue with POC. Notify primary team with changes.

## 2022-05-29 NOTE — PROGRESS NOTES
[facility]    Cardiology Progress Note- Cards 2        Date of Admission:  5/27/2022     Assessment & Plan: SL   Dandy EDUARD Sands is a 61 year old male with history of CAD s/p PCI to RCA, LAD 5/24/22, ICM LVEF 20% s/p CRT-D, severe MR, APL with hx of DVT/PE on chronic anticoagulation with warfarin, hx of COVID-19 1/2022 (was hospitalized, not intubated), HTN, and HLD, moderate-severe functional mitral regurgitation admitted 5/27 for hypotension, nausea and vomiting.     Today:  - 40mEq KCl  - RHC + coronary angiogram: no new obstructive disease, index of 2 (reassuring); plan to discharge tomorrow  - Continue to trend CVP  - Brilinta->plavix (nausea as possible side effect of former)  - Start 40mg lasix once daily     # Nausea/vomiting, multifactorial  # Hypotension   Some nausea on day of discharge which worsened after he left. Pt reports taking all AM meds together 5/27 which seemed to make things worse, new onset abdominal pain as well. Dry heaving between meals and nonbloody emesis after any PO intake. Hypotensive with MAPS 50s at OSH. S/p 1L IV fluids. Abd Xray OSH with nonobstructive bowel gas. Concerning for cardiogenic shock vs in-stent thrombosis vs iatrogenic hypotension vs other. PICC placed 5/28, initial cardiac index per PICC MvO2 was 2.3 5/28 but overnight declined to 1.7. Troponin elevated. Blood pressure off all therapies remains low. 5/29 RHC + coronary angiogram to better characterize cardiac state; RA of 6, PA 55/24/32, PCWP of 20. Index of 2 (technically still cardiogenic shock). Angiogram without evidence of significant obstructive disease.   - Treat shock as below  - Switch ticagrelor->clopidogrel (in case nausea was side effect)  - Continue to monitor  - Hold lisinopril    # Ambulatory cardiogenic shock  # Acute on chronic systolic heart failure/HFrEF secondary to ICM (LVEF 10-20%)    Stage D. NYHA Class III.   # Severe Functional MR  #  Dilated LV (LVIDd 6.1 cm)  Transferred from [facility] [place] 5/16/2022 for evaluation for CABG + MVR at a center with ECMO/VAD as a back up. However at the OSH, his EF was reported 30-35%, and EF here is 10-20% (on TTE and CMRI) with severely dilated LV. CMRI showing infarcted myocardium in LAD territory. Given the degree of LV dysfunction/dilation, moderate to severe functional MR and lack of viable myocardium, cardiac surgery would be high risk. Opted instead for RCA and LAD stenting and medical management (see below). Medical therapy was optimized as outlined below prior to discharge and plan is to monitor any improvement after revascularization with hopes of increasing medical therapy as outpatient. However, given his advanced disease, there is a possibility that he will need advanced therapies. This evaluation was mostly completed while inpatient last admission, but he has not yet been presented, nor approved.     On discharge he had systolic blood pressures in the 90s with very rare values in the 80s with recovery to the 90s without intervention. CASEY after about 200 feet, overall stable/improving. No lightheadedness. Tolerating PO intake. Did have some nausea, which worsened after discharged. Also with hypotension at the outside hospital. 5/29 RHC + coronary angiogram to better characterize cardiac state; RA of 6, PA 55/24/32, PCWP of 20. Index of 2 (technically still cardiogenic shock). Angiogram without evidence of significant obstructive disease.     - Fluid status: euvolemic, start lasix 40mg daily (PTA was on 40mg BID but given normal RA pressure + wedge of 20, believe he would benefit from diuresis)  - ACEi/ARB/ARNI/afterload reduction:Hold PTA lisinopril 2.5 mg BID for hypotension  - BB: Holding in the setting of cardiogenic shock  - Aldosterone antagonist: deferred while other medical therapy is prioritized  - SGLT2i: deferred while other medical therapy is prioritized  - SCD prophylaxis:  CRT-D  -- Appears that his device is NOT set to pace for the prior 2 years  - Plan to repeat RHC in 2-3 months to assess elevated PVR (last was 5.2 KNOWLES)  - Prior plan was to establish care with [facility] in next 1-2 weeks for local monitoring then establish outpatient care with Dr Stewart in June     LVAD/Transplant Evaluation Checklist  [x]????? labs (CBC, CMP, PT/INR, cystatin C, prealbumin, UA + micro)  [x]????? Infectious (Hep A/B/C, HIV, treponema, HSV 1/2 IgG, CMV IgG, Toxo IgG, EBV IgG, varicella IgG, Quant gold, COVID vaccine/PCR)  [x]????? Utox/nicotine and cotinine/PeTH   []????? Immunocompatibility (last transfusion, ABO, HLA tissue typing, PRA)  [x]????? ECG, Echo  []????? CPX - can be oupatient  []????? 6MWT - can be outpatient  [x]????? RHC, completed but needs repeat in 2-3 months to assess PVR  [x]????? CVTS consult  [x]????? Social work consult  [x]?????  Palliative care consult: final note pending  [x]????? Neuropsych consult: order placed: final note pending  [x]????? Nutrition consult  [x]????? CT Dental   [x]????? Dental consult  [x]????? Abd US + doppler  - Abd US complete done, not with doppler; OK given unremarkable CT CAP  [x]????? Extremity US and ABIs  [x]????? Carotid US (if DM or ICM or >49yo)  []????? PFTs: outpatient  []????? Dexa: outpatient  [x]????? CT head non-contrast  [x]????? CT CAP non-contrast  [x]????? Colonoscopy (>51 yo)  [x]????? PSA/HCG    # Troponin elevation  # Myocardial injury  Troponin 5k on transfer, repeat 2 hours following stable. Continues to be elevated >5k 20 hours after first draw. Ddx includes stent thrombosis (unable to keep meds including brillinta down for 24 hours), new obstructive lesion, myocardial injury in setting of hypotension, worsening cardiac function (see above). Given the patient's history of nausea with myocardial infarction as well as progressively worsening MvO2 on PICC, will proceed with the following:  - RHC + coronary angiogram  today: no significant obstructive disease     # CAD s/p PCI to LAD (2005)  # Hx of MI (2007)  # Severe ostial LAD disease with in-stent restenosis of mid LAD at diag bifurcation, severe proximal RCA disease, mild circ disease now s/p ostial LAD and proximal RCA lithotripsy and stenting on 5/24/22  # S/p CRT-D ([employer] 2006, gen change 2012)  As above, patient was transferred last admission for consideration of CABG. CMRI showing infarcted myocardium in LAD territory. Given the degree of LV dysfunction/dilation, moderate to severe functional MR and lack of viable myocardium, cardiac surgery would be high risk. Now s/p ostial LAD and proximal RCA lithotripsy and stenting on 5/24/22.   - Brilinta and Coumadin (Lovenox bridge to therapeutic INR)  - No ASA given his need for Coumadin  - Not on BB d/t low output      # SLE  # APL  # Hx of DVT and PE  He reports that his main symptom of lupus is diffuse joint pain,  tolerable with Tylenol. Coumadin was held for procedures last admission, currently bridging with lovenox. If anticipate surgery in future, would plan to hold MMF 1 week prior to surgery, re-starting 5-7 days after surgery in absence of surgical site infection, poor wound healing or infection elsewhere.   - Continue lovenox bridge to therapeutic INR with warfarin  - Continue pta hydroxychloroquine 200mg bid   - PTA mycophenolic acid 1500mg q12h     # SVT  # Nonsustained VT  SVT noted 5/21 with rates up to 165bpm self terminating after up to 40 secs, 3 episodes at 4pm, hemodynamically stable. Has continued to have very rare NSVT, but overall minimal.      # Anemia, chronic  - Continue pta ferrous sulfate 325mg daily      # High risk for SUHAIL  Pt had plan for outpatient sleep study, missed due to this hospitalization. Will consider as outpatient.      # Severe malnutrition in the context of chronic illness  Supplements to help maintain nutrition.      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 Will hold of further sedative medication  - PTA lorazepam 0.5-1 mg q4h PRN  - PTA zolpidem 5mg at bedtime prn  - Hydroxyzine prn for anxiety  - Melatonin scheduled   - Duloxetine 30mg     # Mild-Moderate Emphysema  # Hx of COVID-19  Had COVID ~1/2022, was hospitalized for ~6 days, was on BiPap intermittently, was not intubated. Currently no issues. Former smoker 2 ppd for 40 years quit on 09/09/09.       # GERD  - Continue pta famotidine 20mg bid      # BPH  - Continue pta tamsulosin 0.4mg daily     Diet:  2g Na diet, 2L fluid restriction  DVT Prophylaxis: lovenox bridging to warfarin  Fitzgerald Catheter: Not present  Code Status:   FULL      Disposition Plan   Expected discharge: 2 - 3 days, recommended to prior living arrangement once investigation for shock completed, volume status stable on PO regimen.    Entered: Cindy Hall MD 05/29/2022, 3:58 PM     The patient's care was discussed with the Attending Physician, Dr. Stewart.    Cindy Hall MD  [facility]    ______________________________________________________________________    Interval History   NAEO. Less nausea today. Denies fevers, chills, chest pain, shortness of breath, abdominal pain. Continues to have some light-headedness only with quick position changes.     Data reviewed today: I reviewed all medications, new labs and imaging results over the last 24 hours. I personally reviewed the EKG tracing showing first degree AV block, interventricular conduction delay, similar to prior; no new ST/T changes     Physical Exam   Vital Signs: Temp: 98.1  F (36.7  C) Temp src: Oral BP: 97/69 Pulse: 94   Resp: 20 SpO2: 98 % (Simultaneous filing. User may be unaware of other data.) O2 Device: None (Room air)    Weight: 143 lbs 4.78 oz  General Appearance: Ill-appearing male, no acute distress  Respiratory: Mildly increased work of breathing;    Cardiovascular: Borderline tachycardic; 3/6 systolic murmur heard best at apex; JVP est 9. No lower extremity edema  GI: Abdomen soft, nontender; bowel sounds present  Skin: Generalized pallor; no rashes appreciated on exposed skin  Other: Alert, oriented, interactive     Data   Recent Labs   Lab 05/29/22  0704 05/28/22  0540 05/27/22  2215 05/26/22  0840 05/26/22  0501   WBC 4.7  --  5.4 8.3  --    HGB 9.0*  --  9.0* 9.8*  --    MCV 90  --  89 88  --      --  300 296  --    INR 1.59* 1.24* 1.25*  --  1.24*     --  134  --  133   POTASSIUM 3.5  --  3.6  --  4.0   CHLORIDE 102  --  102  --  100   CO2 22  --  24  --  24   BUN 11  --  12  --  14   CR 0.86  --  0.85  --  0.94   ANIONGAP 10  --  8  --  9   ELDA 9.0  --  8.9  --  9.0   *  --  97  --  83   ALBUMIN 3.0*  --  3.2*  --  3.5   PROTTOTAL 6.6*  --  6.9  --  7.5   BILITOTAL 0.7  --  0.6  --  0.8   ALKPHOS 71  --  69  --  79   ALT 30  --  33  --  42   AST 16  --  18  --  29     No results found for this or any previous visit (from the past 24 hour(s)).

## 2022-05-30 LAB
ALBUMIN SERPL-MCNC: 3 G/DL (ref 3.4–5)
ALP SERPL-CCNC: 70 U/L (ref 40–150)
ALT SERPL W P-5'-P-CCNC: 28 U/L (ref 0–70)
ANION GAP SERPL CALCULATED.3IONS-SCNC: 9 MMOL/L (ref 3–14)
AST SERPL W P-5'-P-CCNC: 14 U/L (ref 0–45)
BASE EXCESS BLDV CALC-SCNC: -2.6 MMOL/L (ref -7.7–1.9)
BILIRUB SERPL-MCNC: 0.6 MG/DL (ref 0.2–1.3)
BUN SERPL-MCNC: 12 MG/DL (ref 7–30)
CALCIUM SERPL-MCNC: 8.7 MG/DL (ref 8.5–10.1)
CHLORIDE BLD-SCNC: 105 MMOL/L (ref 94–109)
CO2 SERPL-SCNC: 21 MMOL/L (ref 20–32)
CREAT SERPL-MCNC: 0.86 MG/DL (ref 0.66–1.25)
ERYTHROCYTE [DISTWIDTH] IN BLOOD BY AUTOMATED COUNT: 17.2 % (ref 10–15)
GFR SERPL CREATININE-BSD FRML MDRD: >90 ML/MIN/1.73M2
GLUCOSE BLD-MCNC: 93 MG/DL (ref 70–99)
HCO3 BLDV-SCNC: 22 MMOL/L (ref 21–28)
HCT VFR BLD AUTO: 27.1 % (ref 40–53)
HGB BLD-MCNC: 8.7 G/DL (ref 13.3–17.7)
INR PPP: 1.78 (ref 0.85–1.15)
MCH RBC QN AUTO: 28.7 PG (ref 26.5–33)
MCHC RBC AUTO-ENTMCNC: 32.1 G/DL (ref 31.5–36.5)
MCV RBC AUTO: 89 FL (ref 78–100)
O2/TOTAL GAS SETTING VFR VENT: 21 %
OXYHGB MFR BLDV: 45 % (ref 70–75)
PCO2 BLDV: 35 MM HG (ref 40–50)
PH BLDV: 7.4 [PH] (ref 7.32–7.43)
PLATELET # BLD AUTO: 284 10E3/UL (ref 150–450)
PO2 BLDV: 27 MM HG (ref 25–47)
POTASSIUM BLD-SCNC: 3.6 MMOL/L (ref 3.4–5.3)
PROT SERPL-MCNC: 6.6 G/DL (ref 6.8–8.8)
RBC # BLD AUTO: 3.03 10E6/UL (ref 4.4–5.9)
SODIUM SERPL-SCNC: 135 MMOL/L (ref 133–144)
WBC # BLD AUTO: 4.5 10E3/UL (ref 4–11)

## 2022-05-30 PROCEDURE — 250N000013 HC RX MED GY IP 250 OP 250 PS 637: Performed by: STUDENT IN AN ORGANIZED HEALTH CARE EDUCATION/TRAINING PROGRAM

## 2022-05-30 PROCEDURE — 85027 COMPLETE CBC AUTOMATED: CPT | Performed by: STUDENT IN AN ORGANIZED HEALTH CARE EDUCATION/TRAINING PROGRAM

## 2022-05-30 PROCEDURE — 99233 SBSQ HOSP IP/OBS HIGH 50: CPT | Mod: 25 | Performed by: INTERNAL MEDICINE

## 2022-05-30 PROCEDURE — 85610 PROTHROMBIN TIME: CPT | Performed by: STUDENT IN AN ORGANIZED HEALTH CARE EDUCATION/TRAINING PROGRAM

## 2022-05-30 PROCEDURE — 999N000007 HC SITE CHECK

## 2022-05-30 PROCEDURE — 80053 COMPREHEN METABOLIC PANEL: CPT | Performed by: STUDENT IN AN ORGANIZED HEALTH CARE EDUCATION/TRAINING PROGRAM

## 2022-05-30 PROCEDURE — 250N000011 HC RX IP 250 OP 636: Performed by: STUDENT IN AN ORGANIZED HEALTH CARE EDUCATION/TRAINING PROGRAM

## 2022-05-30 PROCEDURE — 250N000013 HC RX MED GY IP 250 OP 250 PS 637: Performed by: INTERNAL MEDICINE

## 2022-05-30 PROCEDURE — 250N000012 HC RX MED GY IP 250 OP 636 PS 637: Performed by: STUDENT IN AN ORGANIZED HEALTH CARE EDUCATION/TRAINING PROGRAM

## 2022-05-30 PROCEDURE — 120N000003 HC R&B IMCU UMMC

## 2022-05-30 PROCEDURE — 82805 BLOOD GASES W/O2 SATURATION: CPT | Performed by: STUDENT IN AN ORGANIZED HEALTH CARE EDUCATION/TRAINING PROGRAM

## 2022-05-30 PROCEDURE — 36592 COLLECT BLOOD FROM PICC: CPT | Performed by: STUDENT IN AN ORGANIZED HEALTH CARE EDUCATION/TRAINING PROGRAM

## 2022-05-30 RX ORDER — CLOPIDOGREL BISULFATE 75 MG/1
75 TABLET ORAL DAILY
Status: ON HOLD | COMMUNITY
End: 2022-05-30

## 2022-05-30 RX ORDER — ATORVASTATIN CALCIUM 40 MG/1
40 TABLET, FILM COATED ORAL AT BEDTIME
Status: DISCONTINUED | OUTPATIENT
Start: 2022-05-31 | End: 2022-05-31 | Stop reason: HOSPADM

## 2022-05-30 RX ORDER — PROMETHAZINE HYDROCHLORIDE 12.5 MG/1
12.5 TABLET ORAL EVERY 6 HOURS PRN
Status: DISCONTINUED | OUTPATIENT
Start: 2022-05-30 | End: 2022-05-31 | Stop reason: HOSPADM

## 2022-05-30 RX ORDER — FUROSEMIDE 40 MG
40 TABLET ORAL DAILY
Qty: 60 TABLET | Refills: 3 | Status: SHIPPED | OUTPATIENT
Start: 2022-05-31 | End: 2022-08-21

## 2022-05-30 RX ORDER — HYDROXYZINE HYDROCHLORIDE 25 MG/1
25 TABLET, FILM COATED ORAL EVERY 6 HOURS PRN
Status: DISCONTINUED | OUTPATIENT
Start: 2022-05-30 | End: 2022-05-31 | Stop reason: HOSPADM

## 2022-05-30 RX ORDER — CLOPIDOGREL BISULFATE 75 MG/1
75 TABLET ORAL DAILY
Qty: 30 TABLET | Refills: 3 | Status: SHIPPED | OUTPATIENT
Start: 2022-05-31 | End: 2022-08-21

## 2022-05-30 RX ORDER — TAMSULOSIN HYDROCHLORIDE 0.4 MG/1
0.4 CAPSULE ORAL EVERY EVENING
Status: DISCONTINUED | OUTPATIENT
Start: 2022-05-31 | End: 2022-05-31 | Stop reason: HOSPADM

## 2022-05-30 RX ORDER — PROMETHAZINE HYDROCHLORIDE 12.5 MG/1
12.5 TABLET ORAL EVERY 6 HOURS PRN
Qty: 30 TABLET | Refills: 1 | Status: SHIPPED | OUTPATIENT
Start: 2022-05-30 | End: 2022-09-04

## 2022-05-30 RX ORDER — WARFARIN SODIUM 7.5 MG/1
7.5 TABLET ORAL
Status: COMPLETED | OUTPATIENT
Start: 2022-05-30 | End: 2022-05-30

## 2022-05-30 RX ORDER — DULOXETIN HYDROCHLORIDE 30 MG/1
30 CAPSULE, DELAYED RELEASE ORAL AT BEDTIME
Status: DISCONTINUED | OUTPATIENT
Start: 2022-05-31 | End: 2022-05-31 | Stop reason: HOSPADM

## 2022-05-30 RX ORDER — HYDRALAZINE HYDROCHLORIDE 25 MG/1
25 TABLET, FILM COATED ORAL ONCE
Status: DISCONTINUED | OUTPATIENT
Start: 2022-05-30 | End: 2022-05-30

## 2022-05-30 RX ORDER — HYDROXYZINE HYDROCHLORIDE 25 MG/1
25 TABLET, FILM COATED ORAL EVERY 6 HOURS PRN
Qty: 60 TABLET | Refills: 3 | Status: SHIPPED | OUTPATIENT
Start: 2022-05-30 | End: 2022-09-04

## 2022-05-30 RX ADMIN — MYCOPHENOLATE MOFETIL 1500 MG: 500 TABLET, FILM COATED ORAL at 20:03

## 2022-05-30 RX ADMIN — HYDROXYCHLOROQUINE SULFATE 200 MG: 200 TABLET, FILM COATED ORAL at 07:59

## 2022-05-30 RX ADMIN — ZOLPIDEM TARTRATE 5 MG: 5 TABLET ORAL at 21:09

## 2022-05-30 RX ADMIN — ATORVASTATIN CALCIUM 40 MG: 40 TABLET, FILM COATED ORAL at 07:59

## 2022-05-30 RX ADMIN — MYCOPHENOLATE MOFETIL 1500 MG: 500 TABLET, FILM COATED ORAL at 08:00

## 2022-05-30 RX ADMIN — FAMOTIDINE 20 MG: 20 TABLET ORAL at 07:59

## 2022-05-30 RX ADMIN — ENOXAPARIN SODIUM 70 MG: 80 INJECTION SUBCUTANEOUS at 11:08

## 2022-05-30 RX ADMIN — THIAMINE HCL TAB 100 MG 100 MG: 100 TAB at 07:59

## 2022-05-30 RX ADMIN — HYDROXYCHLOROQUINE SULFATE 200 MG: 200 TABLET, FILM COATED ORAL at 20:03

## 2022-05-30 RX ADMIN — ENOXAPARIN SODIUM 70 MG: 80 INJECTION SUBCUTANEOUS at 23:10

## 2022-05-30 RX ADMIN — Medication 5 MG: at 21:05

## 2022-05-30 RX ADMIN — TAMSULOSIN HYDROCHLORIDE 0.4 MG: 0.4 CAPSULE ORAL at 07:59

## 2022-05-30 RX ADMIN — Medication 6 MG: at 21:05

## 2022-05-30 RX ADMIN — LORAZEPAM 0.5 MG: 0.5 TABLET ORAL at 10:41

## 2022-05-30 RX ADMIN — DULOXETINE 30 MG: 30 CAPSULE, DELAYED RELEASE ORAL at 07:59

## 2022-05-30 RX ADMIN — HYDROXYZINE HYDROCHLORIDE 25 MG: 25 TABLET, FILM COATED ORAL at 23:09

## 2022-05-30 RX ADMIN — FUROSEMIDE 40 MG: 40 TABLET ORAL at 07:59

## 2022-05-30 RX ADMIN — FAMOTIDINE 20 MG: 20 TABLET ORAL at 20:03

## 2022-05-30 RX ADMIN — CLOPIDOGREL BISULFATE 75 MG: 75 TABLET ORAL at 07:59

## 2022-05-30 RX ADMIN — WARFARIN SODIUM 7.5 MG: 7.5 TABLET ORAL at 17:20

## 2022-05-30 ASSESSMENT — ACTIVITIES OF DAILY LIVING (ADL)
ADLS_ACUITY_SCORE: 22
DEPENDENT_IADLS:: INDEPENDENT
ADLS_ACUITY_SCORE: 22
ADLS_ACUITY_SCORE: 26
ADLS_ACUITY_SCORE: 22
ADLS_ACUITY_SCORE: 26
ADLS_ACUITY_SCORE: 22
ADLS_ACUITY_SCORE: 20
ADLS_ACUITY_SCORE: 22
ADLS_ACUITY_SCORE: 22
ADLS_ACUITY_SCORE: 20

## 2022-05-30 NOTE — PROGRESS NOTES
Pt. Had some nausea today after taking his meds, PRN ativan given. Team put in discharge orders but pt.s family does not feel discharge is safe. Spoke with team and discharge was cancelled.  No other issues during the shift.

## 2022-05-30 NOTE — PHARMACY
Pharmacist consulted to review medications that can be crushed for easier administration.    The following medications can be crushed for administration:  Atorvastatin  Clopidogrel  Famotidine  Furosemide  Hydroxychloroquine (Plaquenel)  Hydroxyzine  Lorazepam  Melatonin  Mycophenolate (Ok to crush at home if usomg caution handling. Do not crush in hospital due to hazardous med. If unable to take PO tablet, could do oral soution)   Thiamine  Warfarin (better if not crushed to get full tablet)  Zolpidem    Do NOT crush, but can open capsules and put in apple sauce:  Tamsulosin (Flomax)  Duloxetine (Cymbalta)    Ok to adjust times on once daily medications to space out. Would recommend switching Atorvastatin, tamsulosin, and duloxetine to bedtime administration.      Caitlin Forrester, PharmD, BCPS

## 2022-05-30 NOTE — CONSULTS
Care Management Initial Consult     General Information  Assessment completed with: Patient, Children, Patient (Dandy), Daughter (Asha)  Type of CM/SW Visit: Initial Assessment     Primary Care Provider verified and updated as needed: Yes   Readmission within the last 30 days: previous discharge plan unsuccessful   Return Category: Exacerbation of disease  Reason for Consult: discharge planning  Advance Care Planning: Advance Care Planning Reviewed: no concerns identified           Communication Assessment  Patient's communication style: spoken language (English or Bilingual)    Hearing Difficulty or Deaf: no   Wear Glasses or Blind: no      Cognitive  Cognitive/Neuro/Behavioral: WDL except Speech: Quiet, Raspy     Living Environment:   People in home: alone     Current living Arrangements: house      Able to return to prior arrangements: yes        Family/Social Support:  Care provided by: self  Provides care for: no one  Marital Status: Single  Support System: Children - adult son and daughter live about 15 minutes away in [facility].     Description of Support System: Supportive, Involved    Support Assessment: Adequate family and caregiver support     Current Resources:   Patient receiving home care services: No     Community Resources:   Outpatient Cardiac Rehab  [facility] [phone]  (In process of scheduling first appt)                                             Anticoagulation Management   [facility]  [phone]  Warfarin indication: CAD  Warfarin goal range: Standard 2-3 (not indicated otherwise)                                                    Equipment currently used at home: none  Supplies currently used at home: None     Employment/Financial:  Employment Status: Retired  *Per SW assessment from 5/18/22, patient reported he was employed. Today, 12 days later, reports he is retired.    Previous employment:   Financial  "Concerns:   None reported      Functional Status:  Prior to admission patient needed assistance:   Dependent ADLs:: Ambulation-no assistive device, Toileting, Bathing, Dressing (SBA per OT)  Dependent IADLs:: Independent  Assesssment of Functional Status: Not at baseline with mobility (due to decreased activity tolerance - likely because of illness)     Mental Health Status:  Mental Health Status: No Current Concerns        Chemical Dependency Status:  Chemical Dependency Status: No Current Concerns              Values/Beliefs:  Spiritual, Cultural Beliefs, Spiritism Practices, Values that affect care: No                Additional Information:  No discharge needs anticipated at this time. Patient with advancing heart failure, readmitted with \"ambulatory cardiogenic shock.\" Undergoing work-up for LVAD/transplant begun during last hospital stay 5/16-5/25/2022. Lives in [place] and gets care through [facility]. F/u plan after last admission was to establish care with Cardiology at [facility] (appt set for 6/2/22 with Dr. Chris Morrison [phone]) - he was not interested in going back to [facility] for cardiology - AND with Dr. Stewart here (not yet scheduled). Patient reported today plan is for discharge tomorrow. Patient's daughter and son are staying in a hotel locally and will provide transportation home.      Patricia Gonsalez, RN, PHN  RN Care Coordinator   [address] [address]   [email or web address]   Casual Employee - Weekend Coverage only  To contact weekend RNCC, dial [phone] and enter pager number [phone] at prompt OR use [place] to text page.  This pager can not be contacted by outside line.     "

## 2022-05-30 NOTE — DISCHARGE INSTRUCTIONS
Please use this website to check your MAP ( mean arterial pressure). It is a better indicator of your blood pressure given your top number or systolic BP will be in the 80s-90s because of your weak heart. If you notice the MAP to be </= 65, please come back to the Emergency Room.     [email or web address]     If you gain > 3 lbs in 1 day or 5 lbs in 1 week please call the cardiologist or come back into the hospital.   For your nausea, this is likely secondary to multiple things: your weak heart and taking too many medications at once, as well as taking potassium pills.     I have prescribed you two new medications for your nausea: Promethazine ( phenergen) and Hydroxyzine ( Atarax). The Atarax will also help you with your anxiety.     The following medications can be crushed for administration:  Atorvastatin  Clopidogrel  Famotidine  Furosemide  Hydroxychloroquine (Plaquenel)  Hydroxyzine  Lorazepam  Melatonin  Mycophenolate (use caution handling)   Thiamine  Warfarin (better if not crushed to get full tablet)  Zolpidem     Do NOT crush, but can open capsules and put in apple sauce:  Tamsulosin (Flomax)  Duloxetine (Cymbalta)     Ok to adjust times on once daily medications to space out. Would recommend switching Atorvastatin, tamsulosin, and duloxetine to bedtime administration.     Lupus:  Continue Lovenox for 2 more days from 5/31- 6/2.    Things that need to be done outside of the hospital:    [] INR tomorrow 6/1 at anticoagulation/warfarin clinic  [] PCP in 1-2 weeks  [] [facility] in about 2 weeks (they will arrange)  [] Dr. Stewart on June 16th in [place]  [] PFTs as outpatient (ordered)  [] Needs repeat RHC in 2-3 months to assess PVR (elevated at 5.4). Will be scheduled for you

## 2022-05-30 NOTE — PLAN OF CARE
Neuro: A&Ox4. Pleasant.   Cardiac: SR, HR 80s-90s. VSS. CVPs 5-6. 1st degree AV block and R BBB.   Respiratory: Sating >95% on RA. No SOB.   GI/: Adequate urine output. No BM overnight.   Diet/appetite: Tolerating 2g Na diet. Eating well. Calorie counts.  Activity:  Assist of SBA, not out of bed overnight.  Pain: At acceptable level on current regimen. Pt denied overnight.  Skin: No new deficits noted. Ecchymotic R groin site, remained unchanged. Some bruising at L radial access site, remained unchanged throughout the night.   LDA's: R PICC, purple lumen transduced for CVPs.     Removal of TR band started at 8pm. At site there is ecchymosis that remained unchanged. CMS WDL. Radial pulse normal. Reverse barbeau normal.    Plan: Continue with POC. Notify primary team with changes.

## 2022-05-30 NOTE — DISCHARGE SUMMARY
[facility]   Cardiology II Service / Advanced Heart Failure  Discharge Summary     Dandy Sands MRN# [id number]   YOB: 1960 Age: 61 year old     DATE OF ADMISSION: 5/27/2022  DATE OF DISCHARGE: 5/30/2022  ADMITTING PROVIDER: Dr. Justo Stewart  DISCHARGE PROVIDER: Dr.Tamas Stewart   PRIMARY PROVIDER:  No Ref-Primary, Physician    DISCHARGE DIAGNOSES:   # Acute on chronic systolic heart failure/HFrEF secondary to ICM (LVEF 10-20%)  Stage C. NYHA Class III.   # CAD s/p PCI to LAD (2005) Severe ostial LAD disease with in-stent restenosis of mid LAD at diag bifurcation, severe proximal RCA disease, mild circ disease now s/p ostial LAD and proximal RCA lithotripsy and stenting on 5/24/22  # Severe Functional MR  # Dilated LV (LVIDd 6.1 cm)  # Hx of MI (2007)  # S/p CRT-D (Medtronic 2006, gen change 2012)  # SVT  # Nonsustained VT  # SLE  # APL  # Hx of DVT and PE  # Anemia, chronic  # High risk for SUHAIL  # Severe malnutrition in the context of chronic illness  # Anxiety due to medical condition  # Insomnia  # Mild-Moderate Emphysema  # Hx of COVID-19  # Liver injury, mixed hepatocellular & cholestatic, acute  # GERD  # BPH    FOLLOW-UP:  [] INR 6/1  [] PCP in 1-2 weeks  [] [facility] in about 2 weeks (they will arrange)  [] Dr. Stewart on June 8 @ 9 am [facility] [place]  [] Cardiology [facility] follow up on 6/8  [] Sleep study as outpatient ( ordered during last discharge)  [] Needs repeat RHC in 2-3 months to assess PVR (elevated at 5.4) ( ordered)    PENDING RESULTS:   [] Immunocompatibility (last transfusion, ABO, HLA tissue typing, PRA): in process    HPI: Please see the detailed H & P by Tony Lora and Dr. Can from 5/16/2022. Briefly, Dandy Sands is a 61 year old male with history of CAD s/p remote PCI to LAD, ICM LVEF 30% s/p CRT-D, severe MR, APL with hx of DVT/PE on chronic anticoagulation with warfarin, hx of COVID-19 1/2022 (was hospitalized, not intubated), HTN, and HLD who  presented to [facility] as a transfer from [facility] in [place] for evaluation of multivessel coronary artery disease and moderate-severe functional mitral regurgitation.     Regarding his cardiac history, underwent LAD stenting first in 2005, STEMI in 2007.  This was complicated by ischemic cardiomyopathy and eventually had a CRT-D (Medtronic 2006, gen change 2012) placed for a left bundle branch block and persistently low LVEF despite medical therapy. He has had various degrees of mitral regurgitation which has been managed medically for quite some time. Recently he was admitted after having COVID-19 for respiratory distress. It was unclear whether this initially was related to pneumonia or decompensated heart failure. After a prolonged hospitalization and extensive testing it was felt that this was related to decompensated heart failure with progression of his MR. Due to persistent hypotension he was taken off most of his GDMT. He was seen HF who felt that the MR was the primary  of his decompensation.  He was eventually discharged with referral for mitraclip eval.       Per report he had evaluation on 5/9/22.  LHC showed severe ostial LAD disease with in-stent restenosis of the mid LAD involving a diagonal bifurcation, mild LCx disease, and severe proximal RCA disease. LVEDP was 25.  TAVARES with anesthesia showed LVEF 30-35%, severely dilated LA, moderate to severe functional MR with multiple jets and apically tethered leaflets (he was hypotensive during the procedure SBP 70s). Due to his age, low LVEF, and multivessel CAD he was admitted that day for surgical evaluation.  CTS at [facility] felt he would benefit from CABG + MVR but that he should be at a center with ECMO/VAD back up.       Otherwise at the OSH, he was diursed about 3kg and transitioned to to oral lasix.  He was taken off warfarin and started on Lovenox as a bridge, and also Plavix was held in anticipation of surgery.  Hospital course was  otherwise complicated by epistaxis requiring cautery by ENT, and a headache (CTH 5/14 negative per report, neuro felt to be migraines). Discharged 5/25.    Readmitted 5/27 for nausea and vomiting. nability to eat and was found to be hypotensive at OSH ED. Per son, patient was having decreased tolerance eating with nausea and vomiting before discharge yesterday, then worsened today. He was only able to take his evening medication on the day of discharge and patient called son today to let him know he was vomiting. Per patient it was NBNB and reports he has decreased appetite. He reports no abdominal pain at this time, denies abd distention, diarrhea or constipation. He reports not able to sleep since his discharge and currently feeling tired after his benzo medication given during in-route from OSH ED to [facility] ED. Son reports that he has only urinated once today. Patient lives by himself and children 15mins away. He has chronic SOB with exertion but denies any recent worsening dyspnea, PND, orthopnea or peripheral edema. He denies any chest pain, fever or chills.       HOSPITAL COURSE:        # Nausea likely multifactorial  Noted to have nausea on day of discharge during last hospitalization which worsened after he left. Pt reports taking all AM meds together 5/27 and, 5/30 which seemed to make things worse, new onset abdominal pain as well. Dry heaving between meals and nonbloody emesis after taking medications. Also worst with potassium supplements. Per patient, this is only notable during eating followed by medications but not notable during only meals without medications.  Abd Xray OSH with nonobstructive bowel gas. Initially Concerning for cardiogenic shock vs in-stent thrombosis vs iatrogenic hypotension vs other. PICC placed 5/28, initial cardiac index per PICC MvO2 was 2.3 5/28 but overnight declined to 1.7. Troponin elevated. Blood pressure off all therapies remains low. 5/29 RHC + coronary angiogram to  better characterize cardiac state; RA of 6, PA 55/24/32, PCWP of 20. Index of 2 (technically still cardiogenic shock). Angiogram without evidence of significant obstructive disease.     - Discontinued Brillianta for c/f nausea as SE. Started plavix.  - Prescribed Atarax for nausea and anxiety.  - Prescribed Promethazine for nausea.    The following medications can be crushed for administration:  Atorvastatin  Clopidogrel  Famotidine  Furosemide  Hydroxychloroquine (Plaquenel)  Hydroxyzine  Lorazepam  Melatonin  Mycophenolate (use caution handling. Please wear gloves)   Thiamine  Warfarin (better if not crushed to get full tablet)  Zolpidem     Do NOT crush, but can open capsules and put in apple sauce:  Tamsulosin (Flomax)  Duloxetine (Cymbalta)     Ok to adjust times on once daily medications to space out. Would recommend switching Atorvastatin, tamsulosin, and duloxetine to bedtime administration.     # Acute on chronic systolic heart failure/HFrEF secondary to ICM (LVEF 10-20%)    Stage D. NYHA Class III.   # Severe Functional MR  # Dilated LV (LVIDd 6.1 cm)  Transferred from [facility] in [place] 5/16/2022 for evaluation for CABG + MVR at a center with ECMO/VAD as a back up. However at the OSH, his EF was reported 30-35%, and EF here is 10-20% (on TTE and CMRI) with severely dilated LV. CMRI showing infarcted myocardium in LAD territory. Given the degree of LV dysfunction/dilation, moderate to severe functional MR and lack of viable myocardium, cardiac surgery would be high risk. Opted instead for RCA and LAD stenting and medical management (see below). Medical therapy was optimized as outlined below prior to discharge and plan is to monitor any improvement after revascularization with hopes of increasing medical therapy as outpatient. However, given his advanced disease, there is a possibility that he will need advanced therapies. This evaluation was mostly completed while inpatient last admission, but he  has not yet been presented, nor approved.     On discharge he had systolic blood pressures in the 90s with very rare values in the 80s with recovery to the 90s without intervention. CASEY after about 200 feet, overall stable/improving. No lightheadedness. Tolerating PO intake. Did have some nausea, which worsened after discharged. Also with hypotension at the outside hospital. 5/29 RHC + coronary angiogram to better characterize cardiac state; RA of 6, PA 55/24/32, PCWP of 20. Index of 2 (technically still cardiogenic shock). Angiogram without evidence of significant obstructive disease.        - Fluid status: euvolemic, start lasix 40mg daily (PTA was on 40mg BID but given normal RA pressure + wedge of 20, believe he would benefit from diuresis)  - ACEi/ARB/ARNI/afterload reduction:Hold PTA lisinopril 2.5 mg BID for hypotension while inpatient  - BB: Holding given hypotension. Follow up with Cardiology in 1 week  - Aldosterone antagonist: deferred while other medical therapy is prioritized. Address OP   - SGLT2i: deferred while other medical therapy is prioritized  - SCD prophylaxis: CRT-D  -- Appears that his device is NOT set to pace for the prior 2 years  - Plan to repeat RHC in 2-3 months to assess elevated PVR (last was 5.2 KNOWLES)  - Prior plan was to establish care with [facility] in next 1-2 weeks for local monitoring  - Plan to follow up with Dr. Stewart in 1 week following discharge     LVAD/Transplant Evaluation Checklist  [x]???? labs (CBC, CMP, PT/INR, cystatin C, prealbumin, UA + micro)  [x]???? Infectious (Hep A/B/C, HIV, treponema, HSV 1/2 IgG, CMV IgG, Toxo IgG, EBV IgG, varicella IgG, Quant gold, COVID vaccine/PCR)  [x]???? Utox/nicotine and cotinine/PeTH   []???? Immunocompatibility (last transfusion, ABO, HLA tissue typing, PRA): in process  [x]???? ECG, Echo  []???? CPX - can be oupatient  []???? 6MWT - can be outpatient  [x]???? RHC, completed but needs repeat in 2-3 months to assess  PVR  [x]???? CVTS consult  [x]???? Social work consult  [x]????  Palliative care consult: final note pending  [x]???? Neuropsych consult: order placed: final note pending  [x]???? Nutrition consult  [x]???? CT Dental   [x]???? Dental consult  [x]???? Abd US + doppler  - Abd US complete done, not with doppler; OK given unremarkable CT CAP  [x]???? Extremity US and ABIs  [x]???? Carotid US (if DM or ICM or >49yo)  []???? PFTs: outpatient  []???? Dexa: outpatient  [x]???? CT head non-contrast  [x]???? CT CAP non-contrast  [x]???? Colonoscopy (>51 yo)  [x]???? PSA/HCG     # CAD s/p PCI to LAD (2005)  # Hx of MI (2007)  # Severe ostial LAD disease with in-stent restenosis of mid LAD at diag bifurcation, severe proximal RCA disease, mild circ disease now s/p ostial LAD and proximal RCA lithotripsy and stenting on 5/24/22  # S/p CRT-D (Medtronic 2006, gen change 2012)  As above, patient was transferred for consideration of CABG. CMRI showing infarcted myocardium in LAD territory. Given the degree of LV dysfunction/dilation, moderate to severe functional MR and lack of viable myocardium, cardiac surgery would be high risk. Now s/p ostial LAD and proximal RCA lithotripsy and stenting on 5/24/22.   - Plavix and Coumadin (Lovenox bridge to therapeutic INR until 6/2). INR on discharge is 2.22. No ASA given his need for Coumadin.  - Not on BB d/t low output      # SLE  # APL  # Hx of DVT and PE  He reports that his main symptom of lupus is diffuse joint pains that is tolerable with Tylenol. Coumadin was held for procedures, he was bridged with lovenox. On discharge, he will continue his lovenox bridge until his INR is therapeutic. PCP will continue to manage his coumadin.  - Continue pta hydroxychloroquine 200mg bid   - PTA mycophenolic acid 1500mg q12h  -- Would plan to hold MMF one week prior to surgery and re-starting 5-7 days after surgery in the absence of surgical site infection, poor wound healing or infection  elsewhere.     # SVT  # Nonsustained VT  SVT noted 5/21 with rates up to 165bpm self terminating after up to 40 secs, 3 episodes at 4pm, hemodynamically stable. Has continued to have very rare NSVT, but overall minimal.      # Anemia, chronic  - Continue pta ferrous sulfate 325mg daily      # High risk for SUHAIL  Pt had plan for outpatient sleep study, missed due to this hospitalization     # Severe malnutrition in the context of chronic illness  Supplements to help maintain nutrition.     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 Currently no issues. Former smoker 2 ppd for 40 years quit on 09/09/09.      # Liver injury, mixed hepatocellular & cholestatic, acute  Noted 5/17. Likely in setting of hepatic congestion. Resolved.  - Diuresis as above     # GERD  - Continue pta famotidine 20mg bid      # BPH  - Continue pta tamsulosin 0.4mg daily     Laila España Pa-C  Advanced Heart Failure/Cardiology 2 Nurse Practitioner  5/26/2022  Pager: [phone]    PHYSICAL EXAM:  BP 93/74 (BP Location: Left arm)   Pulse 97   Temp 97.1  F (36.2  C) (Axillary)   Resp 18   Wt 65 kg (143 lb 4.8 oz)   SpO2 100%   BMI 22.11 kg/m      GENERAL: Appears comfortable, in no distress. Good energy. Moving easily around the room  HEENT:EOMI, anicteric  NECK: Supple, No JVD  CV: RRR, No r/g, 3/6 systolic murmur at the apex  RESPIRATORY: Respirations regular, even, and unlabored. Lungs CTA throughout.   GI: Soft and non distended with normoactive bowel sounds present in all quadrants. No tenderness, rebound, guarding.   EXTREMITIES: No peripheral edema. All extremities are  warm and well perfused  NEUROLOGIC: Alert and interacting appropriatly. No focal deficits.   MUSCULOSKELETAL: No joint swelling or tenderness. No effusion  SKIN: No jaundice. No rashes or lesions.     LABS:   Last CBC:   CBC RESULTS: Recent Labs   Lab Test 05/31/22  0521   WBC 4.8   RBC 3.10*   HGB 8.7*   HCT 27.7*   MCV 89   MCH 28.1   MCHC 31.4*   RDW 17.1*            Last CMP:  Last Comprehensive Metabolic Panel:  Sodium   Date Value Ref Range Status   05/31/2022 135 133 - 144 mmol/L Final     Potassium   Date Value Ref Range Status   05/31/2022 3.5 3.4 - 5.3 mmol/L Final     Chloride   Date Value Ref Range Status   05/31/2022 104 94 - 109 mmol/L Final     Carbon Dioxide (CO2)   Date Value Ref Range Status   05/31/2022 21 20 - 32 mmol/L Final     Anion Gap   Date Value Ref Range Status   05/31/2022 10 3 - 14 mmol/L Final     Glucose   Date Value Ref Range Status   05/31/2022 95 70 - 99 mg/dL Final     Urea Nitrogen   Date Value Ref Range Status   05/31/2022 11 7 - 30 mg/dL Final     Creatinine   Date Value Ref Range Status   05/31/2022 0.87 0.66 - 1.25 mg/dL Final     GFR Estimate   Date Value Ref Range Status   05/31/2022 >90 >60 mL/min/1.73m2 Final     Comment:     Effective December 21, 2021 eGFRcr in adults is calculated using the 2021 CKD-EPI creatinine equation which includes age and gender (Mar et al., NEJ, DOI: 10.1056/CBKCdg1602331)     Calcium   Date Value Ref Range Status   05/31/2022 9.2 8.5 - 10.1 mg/dL Final     Bilirubin Total   Date Value Ref Range Status   05/31/2022 0.5 0.2 - 1.3 mg/dL Final     Alkaline Phosphatase   Date Value Ref Range Status   05/31/2022 67 40 - 150 U/L Final     ALT   Date Value Ref Range Status   05/31/2022 26 0 - 70 U/L Final     AST   Date Value Ref Range Status   05/31/2022 14 0 - 45 U/L Final               IMAGING:  Results for orders placed or performed during the hospital encounter of 05/16/22   XR Chest Port 1 View    Narrative    EXAM: XR CHEST PORT 1  VIEW  5/17/2022 8:32 AM     HISTORY:  heart failure       COMPARISON:  CT chest and Chest radiograph 5/16/2022    FINDINGS:     Portable upright view of the chest. Left chest wall cardiac pacemaker  with leads in stable position. Stable cardiomegaly. Unchanged upper  lobe predominant reticular opacities and bibasilar opacities. No acute  airspace disease.    No acute osseous abnormality. Visualized upper abdomen is  unremarkable.        Impression    IMPRESSION:   1. No acute airspace disease. Interstitial/reticular changes again  noted.  2. Cardiomegaly.    I have personally reviewed the examination and initial interpretation  and I agree with the findings.    GABRIEL COURTNEY MD         SYSTEM ID:  [id number]   US Carotid Bilateral    Narrative    Exam: Bilateral carotid duplex Doppler ultrasound dated 5/16/2022 7:15  PM    Clinical history: pre-op cabg    Comparison Study: None    Ordering provider: Grant Gomez    Technique: Grayscale (B-mode) and duplex and spectral Doppler  ultrasound of the extracranial internal carotid, external carotid,  vertebral artery origins, right brachiocephalic/subclavian and left  subclavian arteries. Velocity measurements obtained with angle  correction at or less than 60 degrees.    Findings:    Right side:     Plaque Morphology: Predominantly echogenic, Smooth       Proximal CCA: 67/20 cm/sec     Mid CCA: 56/15 cm/sec     Distal CCA: 55/18 cm/sec     Carotid bifurcation: 70/23 cm/sec     External CA: 68/13 cm/sec       Proximal ICA: 50/18 cm/sec     Mid ICA: 51/26 cm/sec     Distal ICA: 65/22 cm/sec       Vertebral artery: 38/12 cm/sec, antegrade       ICA/CCA ratio: 1.2    Left side:     Plaque Morphology: None       Proximal CCA: 64/18 cm/sec     Mid CCA: 83/27 cm/sec     Distal CCA: 63/21 cm/sec     Carotid bifurcation: 75/23 cm/sec     External CA: 87/16 cm/sec       Proximal ICA: 61/22 cm/sec     Mid ICA: 62/27 cm/sec     Distal ICA: 43/15 cm/sec       Vertebral artery:  43/22 cm/sec , antegrade       ICA/CCA ratio: 1.0      Impression    Impression:    1. Right side:        Degree of stenosis of the internal carotid artery: Less than 50 %.  .    2. Left side:         Degree of stenosis of the internal carotid artery: Normal. .    [facility] Accreditation Commission Updated Recommendations for  Carotid Stenosis Interpretation Criteria - October 2021.  [email or web address]://[email or web address]-content/[email or web address]-Vascular-Test  [email or web address]-Uvqycecjfjqwkuw-for-Carotid-Stenosis-Interpre  ation-Criteria.pdf         Normal            ICA PSV: < 180 cm/s            Plaque Estimate: None            ICA/CCA PSV Ratio: < 2.0            ICA EDV: < 40 cm/s         < 50%            ICA PSV: < 180 cm/s            Plaque Estimate: < 50%            ICA/CCA PSV Ratio: < 2.0            ICA EDV: < 40 cm/s         50 - 69%            ICA PSV: < 180 - 230 cm/s            Plaque Estimate: > 50%            ICA/CCA PSV Ratio: 2.0 - 4.0            ICA EDV: 40 - 100 cm/s         > 70% but less than near occlusion            ICA PSV: > 230 cm/s            Plaque Estimate: > 50%            ICA/CCA PSV Ratio: > 4.0            ICA EDV: > 100 cm/s         Total occlusion            ICA PSV: Undetectable            Plaque Estimate: Visible, no detectable lumen            ICA/CCA PSV Ratio: N/A            ICA EDV: N/A    I have personally reviewed the examination and initial interpretation  and I agree with the findings.    ARSEN ALEJO MD         SYSTEM ID:  [id number]   CT Chest w/o Contrast    Narrative    CT chest without contrast    INDICATION: Preoperative CABG    COMPARISON: None    FINDINGS: The included thyroid appears normal. Implantable cardiac  defibrillator from left subclavian transvenous approach. Multivessel  coronary artery calcium. Calcifications in the left ventricle wall.  Left atrial enlargement. No pleural or pericardial effusion. IVC  filter. Abdominal aortic calcifications. No  adenopathy in the chest.  Bullous disease in the lung apices with mild to moderate emphysematous  changes in the lungs. No dominant pulmonary nodule. Bony structures  appear grossly unremarkable. There are degenerative changes in the  thoracic spine and lower cervical spine.      Impression    IMPRESSION: Implantable cardiac defibrillator. Coronary artery  calcium. Aortic calcifications. Left atrial mild enlargement. Mild to  moderate emphysema. Calcification in the left ventricle wall.    CONSTANTINO OWEN MD         SYSTEM ID:  [id number]   XR Chest 2 Views    Narrative    Exam: XR CHEST 2 VW, 5/16/2022 6:17 PM    Indication: pre-op cabg    Comparison: Same-day CT    Findings:   Left chest wall implantable cardiac defibrillator leads project over  the right ventricular apex, right atrial appendage, and coronary  sinus. Enlarged cardiac silhouette. Coronary artery stents. The  pulmonary vasculature is within normal limits. Bilateral peripheral  reticular opacities and emphysematous changes are better seen on  comparison CT. No pleural effusion or pneumothorax. Partially  visualized IVC filter.      Impression    Impression: No acute cardiopulmonary abnormality. Please refer to same  day chest CT for further details.    YESENIA KEITH DO         SYSTEM ID:  [id number]   US Lower Extremity Venous Mapping Bilateral    Narrative    Exam: Ultrasound Doppler vein mapping of bilateral lower extremities  dated  5/16/2022 7:18 PM    Comparison study: None    Clinical history: Preop CABG    Ordering clinician: Grant Gomez    Technique: Grayscale (B-mode) with duplex Doppler ultrasound, along  with compression and augmentation, of the bilateral great saphenous  veins.    RIGHT LEG:    CFV: Thrombus: No    GSV:  Great saphenous and adjacent common femoral vein are patent with  antegrade phasic flow.  Proximal thigh: Thrombus: No, Wall thickness: No, 4.1 mm  Mid thigh: Thrombus: No, Wall thickness: No, 2.5 mm  Distal thigh:  Thrombus: No, Wall thickness: No, 2.3 mm  Knee: Thrombus: No, Wall thickness: No, 2.6 mm  Superior calf: Thrombus: No, Wall thickness: No, 2.3 mm  Mid calf: Thrombus: No, Wall thickness: No, 2.2 mm  Distal calf: Thrombus: No, Wall thickness: No, 2.1 mm    LEFT LEG:    CFV: Thrombus: No    GSV:  Great saphenous and adjacent common femoral vein are patent with  antegrade phasic flow.  Proximal thigh: Thrombus: No, Wall thickness: No, 7.2 mm at  saphenofemoral junction, 4.8 mm at groin/proximal thigh  Mid thigh: Thrombus: No, Wall thickness: No, 3.0 mm  Distal thigh: Thrombus: No, Wall thickness: No, 2.4 mm  Knee: Thrombus: No, Wall thickness: No, 3.3 mm  Superior calf: Thrombus: No, Wall thickness: No, 2.4 mm  Mid calf: Thrombus: No, Wall thickness: No, 1.7 mm  Distal calf: Thrombus: No, Wall thickness: No, 1.7 mm        Impression    Impression:   Bilateral great saphenous veins are patent with measurements as above.    I have personally reviewed the examination and initial interpretation  and I agree with the findings.    ARSEN ALEJO MD         SYSTEM ID:  [id number]   CT Dental wo Contrast    Narrative    CT DENTAL WO CONTRAST 5/17/2022 11:38 AM    History:  IP Consult for pre-LVAD dental evaluation    Comparison:  None      TECHNIQUE: 3-D reconstruction by the technologists, with curved  multiplanar reformat of thin section imaging through the mandible and  maxilla obtained without intravenous contrast.    FINDINGS:  No significant soft tissue swelling or mass. Normal facial bone  alignment. No bony erosion.     Visualized portions of the paranasal sinuses demonstrate polypoid  mucosal thickening of the left maxillary sinus.. Minimal to mild  mucosal thickening in the ethmoid air cells, frontal sinus, and right  maxillary sinus. Normal temporomandibular joints. Nasal septal  perforation.      Impression    IMPRESSION:   1. Suspect dental caries along the distal surface of the right  mandibular canine, although  this could be artifactual. No periapical  abscess.  2. Paranasal sinus mucosal thickening without air-fluid levels to  suggest acute sinusitis.    I have personally reviewed the examination and initial interpretation  and I agree with the findings.    SHAUN OCHOA MD         SYSTEM ID:  [id number]   XR Chest Port 1 View    Narrative    EXAM: XR CHEST PORT 1 VIEW  5/18/2022 4:24 PM     HISTORY:  [place] placement       COMPARISON:  CT chest and Chest radiograph 5/16/2022    FINDINGS:   Portable upright view of the chest. Interval placement of [place]-Janae  catheter with tip terminating over the right main pulmonary artery.  Otherwise, stable exam. Implantable cardiac defibrillator.      Impression    IMPRESSION: [place]-Ajnae catheter terminating over the right main  pulmonary artery.    I have personally reviewed the examination and initial interpretation  and I agree with the findings.    CONSTANTINO OWEN MD         SYSTEM ID:  [id number]   XR Chest Port 1 View    Narrative    EXAMINATION:  XR CHEST PORT 1 VIEW 5/19/2022 12:47 AM.    COMPARISON: 5/18/2022    HISTORY:  [place] Placement    FINDINGS: Frontal view. [place]-Janae catheter tip projects over the  central right pulmonary artery. Unchanged pacemaker. Cardiac  silhouette unchanged. No large pleural effusion or pneumothorax.  Decreased patchy bilateral pulmonary opacities.      Impression    IMPRESSION: [place]-Janae catheter tip projects over the central right  pulmonary artery.    I have personally reviewed the examination and initial interpretation  and I agree with the findings.    RAHEEM MEAD MD         SYSTEM ID:  [id number]   US Lower Extremity Arterial Duplex Bilateral    Narrative    Exam: Duplex ultrasound of bilateral lower extremity arteries dated  5/20/2022 9:03 AM     Clinical information: Heart Transplant Candidate Evaluation. Assess  vasculopathy     Comparison: None    Technique: Grayscale (B-mode), color Doppler, and duplex spectral  Doppler ultrasound of the  lower extremity arteries. Velocity  measurements obtained with angle correction of 60 degrees or less.    Ordering provider: Chris Lawrence MD    Findings:     Right lower extremity:     Common femoral artery: Velocity: 111 cm/sec. Waveforms: Triphasic  Profunda femoral artery: Velocity: 110 cm/sec. Waveforms: Triphasic  Proximal SFA: Velocity: 103 cm/sec. Waveforms: Triphasic  Mid SFA:Velocity:  75 cm/sec. Waveforms: Triphasic  Distal SFA: Velocity: 48 cm/sec. Waveforms: Triphasic    Popliteal artery: Velocity: 47 cm/sec. Waveforms: Triphasic    PTA ankle: Velocity: 102 cm/sec. Waveforms: Triphasic  ASHLEY ankle: Velocity: 58 cm/sec. Waveforms: Triphasic    Left lower extremity:    Common femoral artery: Velocity: 86 cm/sec. Waveforms: Triphasic  Deep femoral artery: Velocity: 94 cm/sec. Waveforms: Triphasic  Proximal SFA: Velocity: 75 cm/sec. Waveforms: Triphasic  Mid SFA: Velocity: 89 cm/sec. Waveforms: Triphasic  Distal SFA: Velocity: 45 cm/sec. Waveforms: Triphasic    Popliteal artery: Velocity: 45 cm/sec. Waveforms: Triphasic    PTA ankle: Velocity: 77 cm/sec. Waveforms: Triphasic  ASHLEY ankle: Velocity: 147 cm/sec. Waveforms: Triphasic      Impression    Impression:     1. Right leg: Patent right lower extremity arterial vasculature  without focal hemodynamically significant stenosis.    2. Left leg: Patent left lower extremity arterial vasculature.   Potential stenosis in the left ASHLEY with elevated velocity ratio  greater than 3 (compared to the popliteal artery).    Guidelines:    [facility] duplex criteria for lower limb arterial  occlusive disease    Percent stenosis:     Normal (1-19%): Peak systolic velocity (cm/s): <150, End-diastolic  velocity (cm/s): <40, Velocity ratio (Vr): <1.5, Distal arterial  waveform: Triphasic    20-49%: Peak systolic velocity (cm/s): 150-200, End-diastolic velocity  (cm/s): <40, Velocity ratio (Vr): 1.5-2.0, Distal arterial waveform:  Triphasic    50-75%: Peak  systolic velocity (cm/s): 200-300, End-diastolic velocity  (cm/s): <90, Velocity ratio (Vr): 2.0-3.9, Distal arterial waveform:  Poststenotic turbulence distal to stenosis, monophasic distal waveform    >75%: Peak systolic velocity (cm/s): >300, End-diastolic velocity  (cm/s): <90, Velocity ratio (Vr): >4.0, Distal arterial waveform:  Dampened distal waveform and low PSV/EDV* in the stenosis    Occlusion: Absent flow by color Doppler/pulsed Doppler spectral  analysis; length of occlusion estimated from distance between exit and  reentry collateral arteries    *PSV = peak systolic velocity, EDV = end-diastolic velocity  http://link.galindo.com/chapter/10.1007/[email or web address]_23/fulltext  html    I have personally reviewed the examination and initial interpretation  and I agree with the findings.    ASAF NAZARIO MD         SYSTEM ID:  [id number]   X-ray Chest 2 vws*    Narrative    Chest 2 views    INDICATIONS: Heart transplant candidate evaluation    COMPARISON: Single portable view earlier today    FINDINGS: Heart size appears normal. Pacer/implantable cardiac  defibrillator from left subclavian transvenous approach. Right IJ  [place]-Janae catheter tip in the right main branch pulmonary artery.  Patchy opacities in the lungs may indicate mild atelectasis subtle  edema. Bony structures appear grossly unremarkable.      Impression    IMPRESSION: Pacer/implantable cardiac defibrillator. [place]-Janae  catheter tip in right main branch pulmonary artery. Mild atelectasis  or edema in the lungs.    CONSTANTINO OWEN MD         SYSTEM ID:  [id number]   US JOE Doppler No Exercise    Narrative    Resting JOE 5/20/2022 12:48 PM    CLINICAL HISTORY: Heart transplant evaluation and/or Ventricular  Assist Device (VAD) planning.    COMPARISONS: Axial duplex 5/20/2022    REFERRING PROVIDER: Chris Lawrence    TECHNIQUE: Bilateral JOE and VPR obtained.    FINDINGS:  RIGHT:       Brachial: 91 mmHg       Ankle (PT): 119 mmHg        Ankle (DP): 126 mmHg         JOE: 1.27    LEFT:       Brachial: 99 mmHg       Ankle (PT): 124 mmHg       Ankle (DP): 125 mmHg         JOE: 1.26      Impression    IMPRESSION:  1. RIGHT:       A. Resting JOE is 1.27, normal.    2. LEFT:       A. Resting JOE is 1.26, normal.    Guidelines:    JOE Diagnostic Criteria (Based on criteria published in Circulation  2011; 124: 5646-3676):    > 1.4: Non compressible    1.00 - 1.40: Normal    0.91 - 0.99: Borderline    At or below 0.90: Abnormal    JOE Diagnostic Criteria (Based on ACC/AHA guideline 2008):    >/=1.3 - non compressible vessels    1.00  -1.29 - Normal    0.91 - 0.99 - Borderline    0.41 - 0.90 - Mild to moderate PAD    0.00 - 0.40 - Severe PAD    I have personally reviewed the examination and initial interpretation  and I agree with the findings.    ASAF NAZARIO MD         SYSTEM ID:  [id number]   CT Chest Abdomen Pelvis w/o Contrast    Narrative    EXAMINATION: CT CHEST ABDOMEN PELVIS W/O CONTRAST, 5/19/2022 6:27 PM    TECHNIQUE: Helical CT images from the thoracic inlet through the  symphysis pubis were obtained without intravenous contrast. Contrast  dose:    COMPARISON: Chest x-ray 5/19/2022, CT chest 5/16/2022    HISTORY: Heart Transplant Candidate Evaluation    FINDINGS:  Support devices: Left chest wall cardiac defibrillator leads in the  right ET tube, right ventricle, and coronary sinus. Right IJ central  [place]-Janae catheter with tip in the proximal right pulmonary artery.    Chest: The central tracheobronchial tree is patent. Similar biapical  paraseptal emphysematous changes. No suspicious pulmonary nodules. No  focal consolidation or pleural effusion. No pneumothorax. Main  pulmonary artery mildly enlarged at 3.8 cm. No discrete right heart  enlargement.    The partially visualized thyroid is unremarkable. No abnormally  enlarged axillary, mediastinal, or hilar lymph nodes by size criteria.  The esophagus is unremarkable.    Abdomen and pelvis:  Unremarkable noncontrast appearance of the liver,  gallbladder, pancreas, spleen, adrenal glands, and kidneys. Small  calcified focus within the superior aspect of the spleen, presumed  granulomatous. The urinary bladder is incompletely distended. The  prostate is enlarged.    No abnormally dilated loops of small and large bowel. The appendix is  surgically absent. No intra-abdominal free air or free fluid. No  lymphadenopathy by size criteria.    The patency of the intra-abdominal vasculature cannot be assessed on  this noncontrast exam. Infrarenal IVC filter noted.    Bones and soft tissues: No suspicious osseous lesions.      Impression    IMPRESSION:    No acute or suspicious findings in exam. Pulmonary artery enlargement  suggesting pulmonary artery hypertension. Chronic interstitial  subpleural fibrotic disease with paraseptal emphysematous change.    I have personally reviewed the examination and initial interpretation  and I agree with the findings.    CONSTANTINO OWEN MD         SYSTEM ID:  [id number]   US Upper Ext Arterial Duplex Bilateral    Narrative    Duplex Doppler ultrasound assessment of the upper extremities arteries  bilaterally 5/20/2022 9:03 AM    Clinical information: Preop evaluation prior to possible heart  transplant.    Ordering provider: Chris Lawrence    Technique: B-mode (grayscale) and duplex Doppler ultrasound of the  upper extremity arteries. Velocity measurements obtained with angle  correction at or less than 60 degrees.    Findings:     Multiphasic waveforms throughout. Alternating peak systolic velocity  amplitudes consistent with known history of heart failure.    Peak systolic velocities:    Right Upper Extremity Arteries:     Subclavian proximal: 77 cm/sec, diameter is 0.7 cm  Subclavian mid: 61 cm/sec, diameter is 0.7 cm    Axillary artery: 87 cm/sec, diameter is 0.6 cm    Left Upper Extremity Arteries:    Subclavian proximal: 110 cm/sec, diameter is 0.7 cm  Subclavian mid:  111 cm/sec, diameter is 0.7 cm    Axillary artery: 78 cm/sec, diameter is 0.7 cm      Impression    Impression:    Patent bilateral upper extremity central arteries.    CANDIDA MOON         SYSTEM ID:  [id number]    Upper Extremity Venous Duplex Bilat    Narrative    EXAMINATION: DOPPLER VENOUS ULTRASOUND OF BILATERAL UPPER EXTREMITIES,  5/20/2022 9:04 AM     COMPARISON: None    HISTORY: Heart transplant evaluation and/or Ventricular Assist Device  (VAD) planning. Assess vascular access.     TECHNIQUE:  Gray-scale evaluation with compression, spectral flow, and  color Doppler assessment of the deep venous system of both upper  extremities.    FINDINGS:  Normal blood flow and waveforms are demonstrated in the internal  jugular, innominate, subclavian, and axillary veins bilaterally.      Impression    IMPRESSION:  No evidence of deep venous thrombosis in either upper extremity  centrally.    I have personally reviewed the examination and initial interpretation  and I agree with the findings.    YESENIA KEITH DO         SYSTEM ID:  [id number]   CT Head w/o Contrast    Narrative    CT HEAD W/O CONTRAST 5/19/2022 6:27 PM    History: Heart Transplant Candidate Evaluation   ICD-10:    Comparison: None    Technique: Using multidetector thin collimation helical acquisition  technique, axial, coronal and sagittal CT images from the skull base  to the vertex were obtained without intravenous contrast.   (topogram) image(s) also obtained and reviewed.    Findings: Moderate generalized cerebral atrophy. There is no  intracranial hemorrhage, mass effect, or midline shift. Gray/white  matter differentiation in both cerebral hemispheres is preserved.  Ventricles are proportionate to the cerebral sulci. The basal cisterns  are clear.    The bony calvaria and the bones of the skull base are normal. Mucosal  thickening of the left maxillary sinus. The remainder of the  visualized portions of the paranasal sinuses and  mastoid air cells are  mostly clear.      Impression    Impression:  No acute intracranial pathology. Moderate cerebral atrophy.    I have personally reviewed the examination and initial interpretation  and I agree with the findings.    PHAM ALVAREZ MD         SYSTEM ID:  [id number]   XR Chest Port 1 View    Narrative    EXAMINATION:  XR CHEST PORT 1 VIEW 5/20/2022 1:10 AM.    COMPARISON: 5/19/2022    HISTORY:  [place]-Janae Catheter Positioning    FINDINGS: [place]-Janae catheter tip projects over the main pulmonary  artery. Pacemaker unchanged. Cardiac silhouette unchanged. No large  pleural effusion. No pneumothorax. No new focal pulmonary opacity.      Impression    IMPRESSION: [place]-Janae catheter tip projects over the main pulmonary  artery.    I have personally reviewed the examination and initial interpretation  and I agree with the findings.    CONSTANTINO OWEN MD         SYSTEM ID:  [id number]   US Abdomen Complete Portable    Narrative    EXAMINATION: US ABDOMEN COMPLETE,  5/20/2022 9:03 AM     COMPARISON: CT 5/19/2022    HISTORY: Heart transplant evaluation and/or Ventricular Assist Device  (VAD) planning     TECHNIQUE: The abdomen was scanned in standard fashion with  specialized ultrasound transducer(s) using both gray-scale and limited  color Doppler techniques.    FINDINGS:  Liver: The liver demonstrates normal homogeneous echotexture,  measuring 17.7 cm in craniocaudal dimension. No evidence of a focal  hepatic mass. The main portal vein is patent with antegrade flow,  measuring 1.1 cm in diameter.    Gallbladder:  There is no wall thickening, pericholecystic fluid,  positive sonographic Prater's sign or evidence for cholelithiasis.    Bile Ducts: Both the intra- and extrahepatic biliary system are of  normal caliber.  The common bile duct measures 4 in diameter.    Pancreas: Visualized portions of the head and body of the pancreas are  unremarkable.     Kidneys: Both kidneys are of normal echotexture,  without mass or  hydronephrosis. 7 mm anechoic focus in the lower pole of the right  kidney, compatible with a benign cyst. The craniocaudal dimensions  are: right- 10.5 cm, left- 12.5 cm.    Spleen: The spleen is mildly enlarged, measuring 13.9 in sagittal  dimension.    Aorta and IVC: The visualized portions of the aorta and IVC are  unremarkable. The mid aorta measures 1.9 cm and the distal aorta  measures 1.8 cm. The right common iliac artery measures 1 cm in the  left common iliac artery measures 0.9 cm. The proximal aorta measures  2.2 cm in diameter and the IVC measures 2.4 cm in diameter.    Fluid: Small right pleural effusion. No ascites.      Impression    IMPRESSION:   1. Mild hepatosplenomegaly.  2. Small right pleural effusion.    I have personally reviewed the examination and initial interpretation  and I agree with the findings.    YESENIA KEITH DO         SYSTEM ID:  [id number]   XR Chest Port 1 View    Narrative    EXAM:  XR CHEST PORT 1 VIEW    INDICATION: Inappropriate waveform on swan. Need to assess [place]  position    COMPARISON:  5/20/2022    FINDINGS:    Portable AP view the chest. [place]-Janae catheter terminating over the  right main pulmonary artery. Left upper chest ICD with stable lead  placement. Sharp angulation concerning for possible kinking involving  the [place]-Janae catheter    Stable cardiomediastinal silhouette. Bilateral perihilar opacities,  stable. No pneumothorax. No pleural effusion. Unremarkable upper  abdomen. No acute bony lesions.      Impression    IMPRESSION: No acute airspace opacity. [place]-Janae catheter terminating  over the right main pulmonary artery. Sharp angulation concerning for  possible kinking involving the [place]-Janae catheter .    Finding discussed with Dr. Lawrence by Dr. Carranza at 4:30 PM 5/20/2022     I have personally reviewed the examination and initial interpretation  and I agree with the findings.    JILL CARRANZA MD         SYSTEM ID:  [id number]   XR Chest  Port 1 View    Narrative    EXAMINATION: XR CHEST PORT 1 VIEW, 2022 5:20 PM    INDICATION: [place] placement due to repositioning after abnormal  waveform    COMPARISON: Chest x-ray 2022    FINDINGS: 2 AP radiographs of the chest. Interval signs of right IJ  [place]-Janae catheter with tip terminating over the distal left pulmonary  artery versus central left lower lobe are pulmonary artery. Left chest  generator with ICD and pacer wires projecting over the right atrium,  right ventricle, and coronary sinus.    Trachea is midline. The cardiac mediastinal silhouette is stable. No  pleural effusion. No pneumothorax. Unchanged mixed interstitial and  airspace opacities.      Impression    IMPRESSION:  Interval placement of [place]-Janae catheter with tip terminating of the  distal left pulmonary artery versus central left lower lobar pulmonary  artery. Remainder of the exam is stable.    I have personally reviewed the examination and initial interpretation  and I agree with the findings.    YESENIA KEITH DO         SYSTEM ID:  [id number]   Echo Complete     Value    LVEF  10-15% (severely reduced)    Narrative    [id number]  [id number]  [id number]  [id number]^PILY^MARINA     [facility]  Echocardiography Laboratory  [address] [place] [id number]     Name: OLEG KAUR  MRN: [id number]  : 1960  Study Date: 2022 10:12 AM  Age: 61 yrs  Gender: Male  [place] [facility] [place] Study: Mitral Regurgitation  Ordering Physician: MARINA NASCIMENTO  Performed By: Nacho Alcazar RDCS     BSA: 1.8 m2  Height: 67 in  Weight: 147 lb  BP: 96/72 mmHg  ______________________________________________________________________________  Procedure  Echocardiogram with two-dimensional, color and spectral Doppler performed.  Contrast Optison. Patient was given 9 ml mixture of 3 ml Optison and 6  ml  saline.  ______________________________________________________________________________  Interpretation Summary  Severe left ventricular dilation is present. Left ventricular function is  decreased. The ejection fraction is 10-15% (severely reduced). Severe diffuse  hypokinesis is present.  Mild right ventricular dilation is present. Global right ventricular function  is mildly reduced.  Severe functional mitral insufficiency is present.  Pulmonary hypertension is present. Estimated pulmonary artery systolic  pressure is 44 mmHg plus right atrial pressure.  Dilation of the inferior vena cava is present with abnormal respiratory  variation in diameter. Mean RA pressure estiamted at 15 mmHg.  No pericardial effusion is present.  ______________________________________________________________________________  Left Ventricle  Severe left ventricular dilation is present. Left ventricular function is  decreased. The ejection fraction is 10-15% (severely reduced). Left  ventricular diastolic function is abnormal. Severe diffuse hypokinesis is  present. There is no thrombus.     Right Ventricle  Mild right ventricular dilation is present. Global right ventricular function  is mildly reduced. A pacemaker lead is noted in the right ventricle.     Atria  The right atria appears normal. Severe left atrial enlargement is present.     Mitral Valve  Severe mitral insufficiency is present. The cause of the mitral insufficiency  appears to be altered left ventricular size and geometry. Several features of  severe MR present - E velocity 1.6 m/s, systolic flow reversal in the LLPV,  EROA = 0.57 cm2, MR volume 58 mL.     Aortic Valve  The aortic valve is tricuspid. Trace aortic insufficiency is present.     Tricuspid Valve  Mild tricuspid insufficiency is present. Estimated pulmonary artery systolic  pressure is 44 mmHg plus right atrial pressure. Pulmonary hypertension is  present.     Pulmonic Valve  Mild pulmonic insufficiency  is present.     Vessels  The aorta root is normal. Dilation of the inferior vena cava is present with  abnormal respiratory variation in diameter. IVC diameter >2.1 cm collapsing  <50% with sniff suggests a high RA pressure estimated at 15 mmHg or greater.     Pericardium  No pericardial effusion is present.     Compared to Previous Study  There is no prior study for direct comparison.  ______________________________________________________________________________  MMode/2D Measurements & Calculations  IVSd: 0.72 cm  LVIDd: 6.1 cm  LVIDs: 5.4 cm  LVPWd: 1.0 cm  FS: 10.9 %  LV mass(C)d: 211.9 grams  LV mass(C)dI: 119.5 grams/m2  LA dimension: 5.4 cm  asc Aorta Diam: 3.1 cm  LVOT diam: 2.1 cm  LVOT area: 3.5 cm2  RWT: 0.34     Doppler Measurements & Calculations  MV E max abhilash: 149.7 cm/sec  MV dec slope: 1120 cm/sec2  MV dec time: 0.14 sec  Ao V2 max: 101.0 cm/sec  Ao max P.0 mmHg  Ao V2 mean: 76.0 cm/sec  Ao mean PG: 3.0 mmHg  Ao V2 VTI: 16.2 cm  OCTAVIA(I,D): 3.1 cm2  OCTAVIA(V,D): 2.4 cm2  LV V1 max P.0 mmHg  LV V1 max: 70.9 cm/sec  LV V1 VTI: 14.4 cm  SV(LVOT): 49.9 ml  SI(LVOT): 28.1 ml/m2     PA V2 max: 73.3 cm/sec  PA max P.1 mmHg  PI end-d abhilash: 149.0 cm/sec  TR max abhilash: 330.0 cm/sec  TR max P.6 mmHg  AV Abhilash Ratio (DI): 0.70  OCTAVIA Index (cm2/m2): 1.7  E/E' av.1  Lateral E/e': 16.9  Medial E/e': 45.2     ______________________________________________________________________________  Report approved by: Nakia Gaines 2022 11:46 AM         Cardiac Catheterization    Narrative      Right sided filling pressures are normal.    Moderately elevated pulmonary artery hypertension.    Left sided filling pressures are moderately elevated.    Reduced cardiac output level.     Ambulatory cardiogenic shock. Elevated wedge pressure with high V waves in   the setting of known MR.   Good response to nipride with improvement in cardiac index yet patient is   with borderline symptomatic hypotension at the  time. Transfer to CCU with   leave in [place] for hemodynamic optimization.     Cardiac Catheterization    Narrative      Right sided filling pressures are mildly elevated.    Moderately elevated pulmonary artery hypertension.    Left sided filling pressures are moderately elevated.    Left ventricular filling pressures are severely elevated.    Normal cardiac output level.    Hemodynamic data has been modified in Epic per physician review.    Dist LAD lesion is 80% stenosed.    Prox LAD to Mid LAD lesion is 70% stenosed.    Ost LAD lesion is 90% stenosed.    Mid LM to Dist LM lesion is 40% stenosed.    Prox RCA lesion is 70% stenosed.     Two vessel coronary artery disease (90 ostial LAD, 80% in-stent restenosis   of pLAD to mLAD stent, 80% long, calcified stenosis of pRCA)  Successful PCI of ostial LAD to mLAD with a Synergy 3.00x32 mm TISHA.   Successful PCI of proximal RCA with a Synergy 3.00x38 mm TISHA.   Imaging with IVUS of LM, proximal LAD, proximal RCA.  Shockwave lithotripsy of pLAD (80 pulses) and pRCA (40 pulses).  Hemostasis of R femoral artery with Angioseal.       MRI Cardiac w/contrast    [facility]                                                     CMR Report      MRN:                  [id number]                                  Name:             OLEG KAUR                                  :                  1960                                  Scan Date:   2022 12:02:46                                  Electronically signed by Madyson Galvin 2022-May-18 15:19:32    SUMMARY   ==========================================================================================================    Clinical history: 61 year old man with acute on chronic heart failure, known ischemic cardiomyopathy, non  conditional ICD with an abandoned LV lead.     Comparison CMR: none    Technically difficult study due to artifact from ICD and  breathing    1. The left ventricle is severely dilated with apical wall thinning and akinesis. There is severe global  hypokinesis. The global systolic function is severely reduced. The LVEF is 20%.    2. The right ventricle is normal in cavity size. The global systolic function is moderately reduced. The  RVEF is 34%. ICD device seen in the right heart.     3. The right atrium is normal in size and the left atrium is severely enlarged.    4. There is atleast moderate functional mitral regurgitation. There is mild aortic, tricuspid and pulmonic  regurgitation.     5. There is transmural late gadolinium enhancement involving the mid anterior, mid anteroseptal, all apical  segments including the true apex consistent with prior myocardial infarction in the left anterior  descending artery territory.      6. There is no pericardial effusion.    7. There is no apical LV thrombus.     8. There are small bilateral pleural effusions.     CONCLUSIONS:   Ischemic cardiomyopathy with prior anteroseptal infarction.  Severely dilated left ventricle with severely reduced systolic function, LVEF 20%   Normal right ventricular size with moderately reduced systolic function, RVEF 34%.  Functional mitral regurgitation of atleast moderate severity.     CORE EXAM   ==========================================================================================================    MEASUREMENTS   ----------------------------------------------------------------------------------------      VOLUMETRIC ANALYSIS       ----------------------------------------------  .---------------------------------------------------------.                   LV    Reference   RV    Reference    +-----+-----------+------+------------+------+------------+   EDV  ml          313   (109-191)   181   (105-205)         ml/m^2      170   (60-95)      98   ()     ESV  ml          250   (27-72)     120   (20-80)           ml/m^2      136    (14-36)      65   (11-40)      CO   L/min      5.61              5.35                     L/min/m^2  3.05              2.91                SV   ml           64   ()     61   ()          ml/m^2       35   (40-64)      33   (37-69)      EF   %            20   (58-76)      34   (55-81)     '-----+-----------+------+------------+------+------------'            CARDIAC OUTPUT HR:  88 BPM      SCAN INFO   ==========================================================================================================    GENERAL   ----------------------------------------------------------------------------------------      CONTRAST AGENT       ----------------------------------------------          TYPE:  Gadavist          GD CONCENTRATION:  0.5 M          VOLUME ADMINISTERED:  10 ml          DOSAGE:  0.07 mmol/kg        VITALS       ----------------------------------------------          HEIGHT:  67.00 in          HEIGHT:  170.18 cm          WEIGHT:  159.99 lbs          WEIGHT:  72.57 kgs          BSA:  1.84 m^2        PULSE SEQUENCES       ----------------------------------------------          SSFP cine, 2D LGE segmented, 2D LGE single-shot, 3D LGE         SETUP       ----------------------------------------------          ATTENDING PHYSICIAN:  ADRIÁN CHAUDHARY   ----------------------------------------------------------------------------------------      CPT Codes      ICD10 Codes      Report generated by [employer], a product of [employer]       PROCEDURES:  5/24/22 Coronary Angiogram  ostial LAD and proximal RCA lithotripsy and stenting on 5/24/22 5/24 [facility]  5/18 [facility]      DISCHARGE MEDICATIONS:  Current Discharge Medication List      START taking these medications    Details   !! clopidogrel (PLAVIX) 75 MG tablet Take 1 tablet (75 mg) by mouth daily  Qty: 30 tablet, Refills: 3    Associated Diagnoses: Cardiogenic shock (H)       !! - Potential  duplicate medications found. Please discuss with provider.      CONTINUE these medications which have CHANGED    Details   furosemide (LASIX) 40 MG tablet Take 1 tablet (40 mg) by mouth daily  Qty: 60 tablet, Refills: 3    Associated Diagnoses: Decompensated heart failure (H)         CONTINUE these medications which have NOT CHANGED    Details   !! clopidogrel (PLAVIX) 75 MG tablet Take 75 mg by mouth daily      mycophenolate (GENERIC EQUIVALENT) 500 MG tablet Take 1,500 mg by mouth 2 times daily      atorvastatin (LIPITOR) 40 MG tablet Take 40 mg by mouth daily      DULoxetine (CYMBALTA) 30 MG capsule Take 1 capsule (30 mg) by mouth daily  Qty: 30 capsule, Refills: 0    Associated Diagnoses: Reactive depression      enoxaparin ANTICOAGULANT (LOVENOX) 80 MG/0.8ML syringe Inject 0.7 mLs (70 mg) Subcutaneous every 12 hours Use until your INR is therpautic and your coumadin clnic tells you to stop.  Qty: 12 mL, Refills: 0    Associated Diagnoses: Antiphospholipid antibody syndrome (H)      famotidine (PEPCID) 20 MG tablet Take 20 mg by mouth 2 times daily      ferrous sulfate (FE TABS) 325 (65 Fe) MG EC tablet Take 325 mg by mouth daily (with lunch)      hydroxychloroquine (PLAQUENIL) 200 MG tablet Take 200 mg by mouth 2 times daily      LORazepam (ATIVAN) 0.5 MG tablet Take 0.5-1 mg by mouth every 4 hours as needed for anxiety      melatonin 3 MG tablet Take 6 mg by mouth nightly as needed for sleep      potassium chloride ER (KLOR-CON M) 20 MEQ CR tablet Take 1 tablet (20 mEq) by mouth 2 times daily  Qty: 60 tablet, Refills: 1    Associated Diagnoses: Acute on chronic systolic heart failure (H)      tamsulosin (FLOMAX) 0.4 MG capsule Take 0.4 mg by mouth daily      thiamine (B-1) 100 MG tablet Take 1 tablet (100 mg) by mouth daily  Qty: 30 tablet, Refills: 0    Associated Diagnoses: Acute on chronic systolic heart failure (H)      warfarin ANTICOAGULANT (COUMADIN) 5 MG tablet Take 1 tablet (5 mg) by mouth daily Get  an INR on Friday 5/27 and then follow instructions by your coumadin clinic  Refills: 0      zolpidem (AMBIEN) 5 MG tablet Take 5 mg by mouth nightly as needed for sleep       !! - Potential duplicate medications found. Please discuss with provider.      STOP taking these medications       lisinopril (ZESTRIL) 2.5 MG tablet Comments:   Reason for Stopping:         ticagrelor (BRILINTA) 90 MG tablet Comments:   Reason for Stopping:               DISCHARGE DISPOSITION: Dandy Sands will discharge to home in stable condition.     DISCHARGE INSTRUCTIONS:  Discharge Procedure Orders   Reason for your hospital stay   Order Comments: You came in with heart failure and underwent an angiogram and a right heart catheter to evaluate your heart pressures. It shows a high left sided filling pressure and high systemic vascular resistance. Your blood pressure was low so we could not start you on medication to reduce the resistance but will have you follow up with Dr. Stewart in 1 week to address this in clinic.     Activity   Order Comments: Your activity upon discharge: activity as tolerated     Order Specific Question Answer Comments   Is discharge order? Yes      Adult [facility] Follow-up and recommended labs and tests   Order Comments: Follow up with primary care provider, Scar Jeffrey, within 7 days to evaluate medication change.  The following labs/tests are recommended: BMP, magnesium.      Appointments on [place] and/or [facility] (with [facility] or [facility] provider or service). Call [phone] if you haven't heard regarding these appointments within 7 days of discharge.    Follow up with Dr. Stewart in Cardiology clinic in 1 week.     Diet   Order Comments: Follow this diet upon discharge: Orders Placed This Encounter          Fluid restriction 2000 ML FLUID      2 Gram Sodium Diet     Order Specific Question Answer Comments   Is discharge order? Yes        55 minutes spent in discharge, including >50% in counseling and  coordination of care, medication review and plan of care recommended on follow up. Questions were answered, patient agrees to plan.

## 2022-05-30 NOTE — PROGRESS NOTES
[facility]    Cardiology Progress Note- Cards 2        Date of Admission:  5/27/2022     Assessment & Plan: HVSL   Dandy EDUARD Sands is a 61 year old male with history of CAD s/p PCI to RCA, LAD 5/24/22, ICM LVEF 20% s/p CRT-D, severe MR, APL with hx of DVT/PE on chronic anticoagulation with warfarin, hx of COVID-19 1/2022 (was hospitalized, not intubated), HTN, and HLD, moderate-severe functional mitral regurgitation admitted 5/27 for hypotension, nausea and vomiting.     Today:  - Discontinued Ativan  - Plan to discharge this afternoon w f/u with Dr. Stewart in 1 week.  - Keep overnight for monitoring given nausea     # Nausea/vomiting, multifactorial  # Hypotension   Some nausea on day of discharge which worsened after he left. Pt reports taking all AM meds together 5/27, 5/30 which seemed to make things worse, new onset abdominal pain as well. Dry heaving between meals and nonbloody emesis after taking medications. Also worst with potassium supplements.  Abd Xray OSH with nonobstructive bowel gas. Initially Concerning for cardiogenic shock vs in-stent thrombosis vs iatrogenic hypotension vs other. PICC placed 5/28, initial cardiac index per PICC MvO2 was 2.3 5/28 but overnight declined to 1.7. Troponin elevated. Blood pressure off all therapies remains low. 5/29 RHC + coronary angiogram to better characterize cardiac state; RA of 6, PA 55/24/32, PCWP of 20. Index of 2 (technically still cardiogenic shock). Angiogram without evidence of significant obstructive disease.   - Treat shock as below  - Switch ticagrelor->clopidogrel (in case nausea was side effect)  - Continue to monitor  - Hold lisinopril as below  - Discussed with pharmacy regarding which medications spacing and crush ability as patient would like to put medications into smoothies.  Will be attached to discharge summary      # Ambulatory cardiogenic shock  # Acute on chronic systolic heart failure/HFrEF  secondary to ICM (LVEF 10-20%)    Stage D. NYHA Class III.   # Severe Functional MR  # Dilated LV (LVIDd 6.1 cm)  Transferred from [facility] in [place] 5/16/2022 for evaluation for CABG + MVR at a center with ECMO/VAD as a back up. However at the OSH, his EF was reported 30-35%, and EF here is 10-20% (on TTE and CMRI) with severely dilated LV. CMRI showing infarcted myocardium in LAD territory. Given the degree of LV dysfunction/dilation, moderate to severe functional MR and lack of viable myocardium, cardiac surgery would be high risk. Opted instead for RCA and LAD stenting and medical management (see below). Medical therapy was optimized as outlined below prior to discharge and plan is to monitor any improvement after revascularization with hopes of increasing medical therapy as outpatient. However, given his advanced disease, there is a possibility that he will need advanced therapies. This evaluation was mostly completed while inpatient last admission, but he has not yet been presented, nor approved.     On discharge he had systolic blood pressures in the 90s with very rare values in the 80s with recovery to the 90s without intervention. CASEY after about 200 feet, overall stable/improving. No lightheadedness. Tolerating PO intake. Did have some nausea, which worsened after discharged. Also with hypotension at the outside hospital. 5/29 RHC + coronary angiogram to better characterize cardiac state; RA of 6, PA 55/24/32, PCWP of 20. Index of 2 (technically still cardiogenic shock). Angiogram without evidence of significant obstructive disease.     - Fluid status: euvolemic, start lasix 40mg daily (PTA was on 40mg BID but given normal RA pressure + wedge of 20, believe he would benefit from diuresis)  - ACEi/ARB/ARNI/afterload reduction:Hold PTA lisinopril 2.5 mg BID for hypotension while inpatient  - BB: Holding given hypotension. Follow up with Cardiology in 1 week  - Aldosterone antagonist: deferred while  other medical therapy is prioritized. Address OP   - SGLT2i: deferred while other medical therapy is prioritized  - SCD prophylaxis: CRT-D  -- Appears that his device is NOT set to pace for the prior 2 years  - Plan to repeat RHC in 2-3 months to assess elevated PVR (last was 5.2 KNOWLES)  - Prior plan was to establish care with [facility] in next 1-2 weeks for local monitoring  - Plan to follow up with Dr. Stewart in 1 week following discharge     LVAD/Transplant Evaluation Checklist  [x]????? labs (CBC, CMP, PT/INR, cystatin C, prealbumin, UA + micro)  [x]????? Infectious (Hep A/B/C, HIV, treponema, HSV 1/2 IgG, CMV IgG, Toxo IgG, EBV IgG, varicella IgG, Quant gold, COVID vaccine/PCR)  [x]????? Utox/nicotine and cotinine/PeTH   []????? Immunocompatibility (last transfusion, ABO, HLA tissue typing, PRA)  [x]????? ECG, Echo  []????? CPX - can be oupatient  []????? 6MWT - can be outpatient  [x]????? RHC, completed but needs repeat in 2-3 months to assess PVR  [x]????? CVTS consult  [x]????? Social work consult  [x]?????  Palliative care consult: final note pending  [x]????? Neuropsych consult: order placed: final note pending  [x]????? Nutrition consult  [x]????? CT Dental   [x]????? Dental consult  [x]????? Abd US + doppler  - Abd US complete done, not with doppler; OK given unremarkable CT CAP  [x]????? Extremity US and ABIs  [x]????? Carotid US (if DM or ICM or >49yo)  []????? PFTs: outpatient  []????? Dexa: outpatient  [x]????? CT head non-contrast  [x]????? CT CAP non-contrast  [x]????? Colonoscopy (>49 yo)  [x]????? PSA/HCG    # Troponin elevation  # Myocardial injury  Troponin 5k on transfer, repeat 2 hours following stable. Continues to be elevated >5k 20 hours after first draw. Ddx includes stent thrombosis (unable to keep meds including brillinta down for 24 hours), new obstructive lesion, myocardial injury in setting of hypotension, worsening cardiac function (see above). Given the patient's history of  nausea with myocardial infarction as well as progressively worsening MvO2 on PICC, will proceed with the following:  - RHC + coronary angiogram 5/29: no significant obstructive disease     # CAD s/p PCI to LAD (2005)  # Hx of MI (2007)  # Severe ostial LAD disease with in-stent restenosis of mid LAD at diag bifurcation, severe proximal RCA disease, mild circ disease now s/p ostial LAD and proximal RCA lithotripsy and stenting on 5/24/22  # S/p CRT-D ([employer] 2006, gen change 2012)  As above, patient was transferred last admission for consideration of CABG. CMRI showing infarcted myocardium in LAD territory. Given the degree of LV dysfunction/dilation, moderate to severe functional MR and lack of viable myocardium, cardiac surgery would be high risk. Now s/p ostial LAD and proximal RCA lithotripsy and stenting on 5/24/22.   - Brilinta and Coumadin (Lovenox bridge to therapeutic INR)  - No ASA given his need for Coumadin  - Not on BB d/t low output      # SLE  # APL  # Hx of DVT and PE  He reports that his main symptom of lupus is diffuse joint pain,  tolerable with Tylenol. Coumadin was held for procedures last admission, currently bridging with lovenox. If anticipate surgery in future, would plan to hold MMF 1 week prior to surgery, re-starting 5-7 days after surgery in absence of surgical site infection, poor wound healing or infection elsewhere.   - Continue lovenox bridge to therapeutic INR with warfarin  - Continue pta hydroxychloroquine 200mg bid   - PTA mycophenolic acid 1500mg q12h     # SVT  # Nonsustained VT  SVT noted 5/21 with rates up to 165bpm self terminating after up to 40 secs, 3 episodes at 4pm, hemodynamically stable. Has continued to have very rare NSVT, but overall minimal.      # Anemia, chronic  - Continue pta ferrous sulfate 325mg daily      # High risk for SUHAIL  Pt had plan for outpatient sleep study, missed due to this hospitalization. Will consider as outpatient.      # Severe malnutrition  in the context of chronic illness  Supplements to help maintain nutrition.      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 Will hold of further sedative medication  - PTA lorazepam 0.5-1 mg q4h PRN  - PTA zolpidem 5mg at bedtime prn  - Hydroxyzine prn for anxiety  - Melatonin scheduled   - Duloxetine 30mg     # Mild-Moderate Emphysema  # Hx of COVID-19  Had COVID ~1/2022, was hospitalized for ~6 days, was on BiPap intermittently, was not intubated. Currently no issues. Former smoker 2 ppd for 40 years quit on 09/09/09.       # GERD  - Continue pta famotidine 20mg bid      # BPH  - Continue pta tamsulosin 0.4mg daily     Diet:  2g Na diet, 2L fluid restriction  DVT Prophylaxis: lovenox bridging to warfarin  Fitzgerald Catheter: Not present  Code Status:   FULL      Disposition Plan   Expected discharge: 2 - 3 days, recommended to prior living arrangement once investigation for shock completed, volume status stable on PO regimen.    Entered: Duncan Luciano MD 05/30/2022, 9:06 AM     The patient's care was discussed with the Attending Physician, Dr. Stewart.    Duncan Luciano MD  [facility]    ______________________________________________________________________    Interval History   NAEON, patient denies SOB, CP, abdominal pain.     Data reviewed today: I reviewed all medications, new labs and imaging results over the last 24 hours.    Physical Exam   Vital Signs: Temp: 97.5  F (36.4  C) Temp src: Oral BP: 101/71 Pulse: 89   Resp: 18 SpO2: 100 % O2 Device: None (Room air)    Weight: 143 lbs 4.78 oz  General Appearance: Ill-appearing male, no acute distress  Respiratory: Mildly increased work of breathing;   Cardiovascular: 3/6 systolic murmur heard best at apex  GI: Abdomen soft, nontender; bowel sounds present  Skin: Generalized pallor; no rashes appreciated on exposed skin  Other: Alert,  oriented, interactive     Data   Recent Labs   Lab 05/30/22  0702 05/29/22  1712 05/29/22  1709 05/29/22  0704 05/28/22  0540 05/27/22  2215   WBC 4.5  --   --  4.7  --  5.4   HGB 8.7* 9.2* 8.9* 9.0*  --  9.0*   MCV 89  --   --  90  --  89     --   --  323  --  300   INR 1.78*  --   --  1.59* 1.24* 1.25*     --   --  134  --  134   POTASSIUM 3.6  --   --  3.5  --  3.6   CHLORIDE 105  --   --  102  --  102   CO2 21  --   --  22  --  24   BUN 12  --   --  11  --  12   CR 0.86  --   --  0.86  --  0.85   ANIONGAP 9  --   --  10  --  8   ELDA 8.7  --   --  9.0  --  8.9   GLC 93  --   --  103*  --  97   ALBUMIN 3.0*  --   --  3.0*  --  3.2*   PROTTOTAL 6.6*  --   --  6.6*  --  6.9   BILITOTAL 0.6  --   --  0.7  --  0.6   ALKPHOS 70  --   --  71  --  69   ALT 28  --   --  30  --  33   AST 14  --   --  16  --  18     Recent Results (from the past 24 hour(s))   Cardiac Catheterization    Narrative      Right sided filling pressures are normal.    Mild elevated pulmonary hypertension.    Left sided filling pressures are mildly elevated.    Left ventricular filling pressures are mildly elevated.    Reduced cardiac output level.    Hemodynamic data has been modified in Epic per physician review.    Mid LM to Dist LM lesion is 40% stenosed.    Dist LAD lesion is 80% stenosed.    Ost LAD to Mid LAD lesion is 20% stenosed.

## 2022-05-30 NOTE — PROGRESS NOTES
Care Management Initial Consult    General Information  Assessment completed with: Patient, Children, Patient (Dandy), Daughter (Asha)  Type of CM/SW Visit: Initial Assessment    Primary Care Provider verified and updated as needed: Yes   Readmission within the last 30 days: previous discharge plan unsuccessful   Return Category: Exacerbation of disease  Reason for Consult: discharge planning  Advance Care Planning: Advance Care Planning Reviewed: no concerns identified          Communication Assessment  Patient's communication style: spoken language (English or Bilingual)    Hearing Difficulty or Deaf: no   Wear Glasses or Blind: no     Cognitive  Cognitive/Neuro/Behavioral: WDL except Speech: Quiet, Raspy    Living Environment:   People in home: alone     Current living Arrangements: house      Able to return to prior arrangements: yes       Family/Social Support:  Care provided by: self  Provides care for: no one  Marital Status: Single  Support System: Children - adult son and daughter live about 15 minutes away in [facility].     Description of Support System: Supportive, Involved    Support Assessment: Adequate family and caregiver support    Current Resources:   Patient receiving home care services: No     Community Resources:   Outpatient Cardiac Rehab  [facility] [phone]  (In process of scheduling first appt)            Anticoagulation Management   [facility]  [phone]  Warfarin indication: CAD  Warfarin goal range: Standard 2-3 (not indicated otherwise)        Equipment currently used at home: none  Supplies currently used at home: None    Employment/Financial:  Employment Status: Retired  *Per SW assessment from 5/18/22, patient reported he was employed. Today, 12 days later, reports he is retired.    Previous employment:   Financial Concerns:   None reported     Functional Status:  Prior to admission patient needed  "assistance:   Dependent ADLs:: Ambulation-no assistive device, Toileting, Bathing, Dressing (SBA per OT)  Dependent IADLs:: Independent  Assesssment of Functional Status: Not at baseline with mobility (due to decreased activity tolerance - likely because of illness)    Mental Health Status:  Mental Health Status: No Current Concerns       Chemical Dependency Status:  Chemical Dependency Status: No Current Concerns             Values/Beliefs:  Spiritual, Cultural Beliefs, Religion Practices, Values that affect care: No               Additional Information:  No discharge needs anticipated at this time. Patient with advancing heart failure, readmitted with \"ambulatory cardiogenic shock.\" Undergoing work-up for LVAD/transplant begun during last hospital stay 5/16-5/25/2022. Lives in [place] and gets care through [facility] in [facility]. F/u plan after last admission was to establish care with Cardiology at [facility] (appt set for 6/2/22 with Dr. Chris Morrison [phone]) - he was not interested in going back to [facility] for cardiology - AND with Dr. Stewart here (not yet scheduled). Patient reported today plan is for discharge tomorrow. Patient's daughter and son are staying in a hotel locally and will provide transportation home.     Patricia Gonsalez, RN, PHN  RN Care Coordinator   [address] [address]   [email or web address]   Casual Employee - Weekend Coverage only  To contact weekend RNCC, dial [phone] and enter pager number [phone] at prompt OR use [employer] to text page.  This pager can not be contacted by outside line.        "

## 2022-05-31 ENCOUNTER — APPOINTMENT (OUTPATIENT)
Dept: OCCUPATIONAL THERAPY | Facility: CLINIC | Age: 62
DRG: 286 | End: 2022-05-31
Attending: INTERNAL MEDICINE
Payer: COMMERCIAL

## 2022-05-31 VITALS
OXYGEN SATURATION: 100 % | BODY MASS INDEX: 22.11 KG/M2 | RESPIRATION RATE: 18 BRPM | TEMPERATURE: 97.1 F | DIASTOLIC BLOOD PRESSURE: 74 MMHG | WEIGHT: 143.3 LBS | HEART RATE: 97 BPM | SYSTOLIC BLOOD PRESSURE: 93 MMHG

## 2022-05-31 LAB
ALBUMIN SERPL-MCNC: 3 G/DL (ref 3.4–5)
ALP SERPL-CCNC: 67 U/L (ref 40–150)
ALT SERPL W P-5'-P-CCNC: 26 U/L (ref 0–70)
ANION GAP SERPL CALCULATED.3IONS-SCNC: 10 MMOL/L (ref 3–14)
AST SERPL W P-5'-P-CCNC: 14 U/L (ref 0–45)
ATRIAL RATE - MUSE: 82 BPM
ATRIAL RATE - MUSE: 94 BPM
BASE EXCESS BLDV CALC-SCNC: -0.8 MMOL/L (ref -7.7–1.9)
BILIRUB SERPL-MCNC: 0.5 MG/DL (ref 0.2–1.3)
BUN SERPL-MCNC: 11 MG/DL (ref 7–30)
CALCIUM SERPL-MCNC: 9.2 MG/DL (ref 8.5–10.1)
CHLORIDE BLD-SCNC: 104 MMOL/L (ref 94–109)
CO2 SERPL-SCNC: 21 MMOL/L (ref 20–32)
CREAT SERPL-MCNC: 0.87 MG/DL (ref 0.66–1.25)
DIASTOLIC BLOOD PRESSURE - MUSE: NORMAL MMHG
DIASTOLIC BLOOD PRESSURE - MUSE: NORMAL MMHG
ERYTHROCYTE [DISTWIDTH] IN BLOOD BY AUTOMATED COUNT: 17.1 % (ref 10–15)
GFR SERPL CREATININE-BSD FRML MDRD: >90 ML/MIN/1.73M2
GLUCOSE BLD-MCNC: 95 MG/DL (ref 70–99)
HCO3 BLDV-SCNC: 23 MMOL/L (ref 21–28)
HCT VFR BLD AUTO: 27.7 % (ref 40–53)
HGB BLD-MCNC: 8.7 G/DL (ref 13.3–17.7)
INR PPP: 2.22 (ref 0.85–1.15)
INTERPRETATION ECG - MUSE: NORMAL
INTERPRETATION ECG - MUSE: NORMAL
MCH RBC QN AUTO: 28.1 PG (ref 26.5–33)
MCHC RBC AUTO-ENTMCNC: 31.4 G/DL (ref 31.5–36.5)
MCV RBC AUTO: 89 FL (ref 78–100)
MDC_IDC_EPISODE_DTM: NORMAL
MDC_IDC_EPISODE_DURATION: 1 S
MDC_IDC_EPISODE_DURATION: 16 S
MDC_IDC_EPISODE_DURATION: 2 S
MDC_IDC_EPISODE_DURATION: 2 S
MDC_IDC_EPISODE_DURATION: 20 S
MDC_IDC_EPISODE_DURATION: 22 S
MDC_IDC_EPISODE_ID: 17
MDC_IDC_EPISODE_ID: 18
MDC_IDC_EPISODE_ID: 19
MDC_IDC_EPISODE_ID: 20
MDC_IDC_EPISODE_ID: 21
MDC_IDC_EPISODE_ID: 22
MDC_IDC_EPISODE_ID: 23
MDC_IDC_EPISODE_ID: 24
MDC_IDC_EPISODE_TYPE: NORMAL
MDC_IDC_LEAD_IMPLANT_DT: NORMAL
MDC_IDC_LEAD_LOCATION: NORMAL
MDC_IDC_LEAD_LOCATION_DETAIL_1: NORMAL
MDC_IDC_LEAD_MFG: NORMAL
MDC_IDC_LEAD_MODEL: NORMAL
MDC_IDC_LEAD_POLARITY_TYPE: NORMAL
MDC_IDC_LEAD_SERIAL: NORMAL
MDC_IDC_MSMT_BATTERY_DTM: NORMAL
MDC_IDC_MSMT_BATTERY_DTM: NORMAL
MDC_IDC_MSMT_BATTERY_REMAINING_LONGEVITY: 72 MO
MDC_IDC_MSMT_BATTERY_REMAINING_LONGEVITY: 72 MO
MDC_IDC_MSMT_BATTERY_RRT_TRIGGER: 2.73
MDC_IDC_MSMT_BATTERY_RRT_TRIGGER: 2.73
MDC_IDC_MSMT_BATTERY_STATUS: NORMAL
MDC_IDC_MSMT_BATTERY_STATUS: NORMAL
MDC_IDC_MSMT_BATTERY_VOLTAGE: 2.96 V
MDC_IDC_MSMT_BATTERY_VOLTAGE: 2.98 V
MDC_IDC_MSMT_CAP_CHARGE_DTM: NORMAL
MDC_IDC_MSMT_CAP_CHARGE_DTM: NORMAL
MDC_IDC_MSMT_CAP_CHARGE_ENERGY: 18 J
MDC_IDC_MSMT_CAP_CHARGE_ENERGY: 18 J
MDC_IDC_MSMT_CAP_CHARGE_TIME: 3.89
MDC_IDC_MSMT_CAP_CHARGE_TIME: 3.89
MDC_IDC_MSMT_CAP_CHARGE_TYPE: NORMAL
MDC_IDC_MSMT_CAP_CHARGE_TYPE: NORMAL
MDC_IDC_MSMT_LEADCHNL_RA_IMPEDANCE_VALUE: 342 OHM
MDC_IDC_MSMT_LEADCHNL_RA_IMPEDANCE_VALUE: 342 OHM
MDC_IDC_MSMT_LEADCHNL_RA_PACING_THRESHOLD_AMPLITUDE: 0.62 V
MDC_IDC_MSMT_LEADCHNL_RA_PACING_THRESHOLD_AMPLITUDE: 0.62 V
MDC_IDC_MSMT_LEADCHNL_RA_PACING_THRESHOLD_AMPLITUDE: 0.75 V
MDC_IDC_MSMT_LEADCHNL_RA_PACING_THRESHOLD_AMPLITUDE: 0.75 V
MDC_IDC_MSMT_LEADCHNL_RA_PACING_THRESHOLD_PULSEWIDTH: 0.4 MS
MDC_IDC_MSMT_LEADCHNL_RA_SENSING_INTR_AMPL: 0.62 MV
MDC_IDC_MSMT_LEADCHNL_RA_SENSING_INTR_AMPL: 0.62 MV
MDC_IDC_MSMT_LEADCHNL_RA_SENSING_INTR_AMPL: 0.75 MV
MDC_IDC_MSMT_LEADCHNL_RA_SENSING_INTR_AMPL: 1 MV
MDC_IDC_MSMT_LEADCHNL_RV_IMPEDANCE_VALUE: 342 OHM
MDC_IDC_MSMT_LEADCHNL_RV_IMPEDANCE_VALUE: 342 OHM
MDC_IDC_MSMT_LEADCHNL_RV_IMPEDANCE_VALUE: 456 OHM
MDC_IDC_MSMT_LEADCHNL_RV_IMPEDANCE_VALUE: 456 OHM
MDC_IDC_MSMT_LEADCHNL_RV_PACING_THRESHOLD_AMPLITUDE: 1 V
MDC_IDC_MSMT_LEADCHNL_RV_PACING_THRESHOLD_PULSEWIDTH: 0.4 MS
MDC_IDC_MSMT_LEADCHNL_RV_SENSING_INTR_AMPL: 11.5 MV
MDC_IDC_MSMT_LEADCHNL_RV_SENSING_INTR_AMPL: 11.5 MV
MDC_IDC_MSMT_LEADCHNL_RV_SENSING_INTR_AMPL: 13 MV
MDC_IDC_MSMT_LEADCHNL_RV_SENSING_INTR_AMPL: 13.1 MV
MDC_IDC_PG_IMPLANT_DTM: NORMAL
MDC_IDC_PG_IMPLANT_DTM: NORMAL
MDC_IDC_PG_MFG: NORMAL
MDC_IDC_PG_MFG: NORMAL
MDC_IDC_PG_MODEL: NORMAL
MDC_IDC_PG_MODEL: NORMAL
MDC_IDC_PG_SERIAL: NORMAL
MDC_IDC_PG_SERIAL: NORMAL
MDC_IDC_PG_TYPE: NORMAL
MDC_IDC_PG_TYPE: NORMAL
MDC_IDC_SESS_CLINIC_NAME: NORMAL
MDC_IDC_SESS_CLINIC_NAME: NORMAL
MDC_IDC_SESS_DTM: NORMAL
MDC_IDC_SESS_DTM: NORMAL
MDC_IDC_SESS_TYPE: NORMAL
MDC_IDC_SESS_TYPE: NORMAL
MDC_IDC_SET_BRADY_AT_MODE_SWITCH_RATE: 171 {BEATS}/MIN
MDC_IDC_SET_BRADY_HYSTRATE: NORMAL
MDC_IDC_SET_BRADY_HYSTRATE: NORMAL
MDC_IDC_SET_BRADY_LOWRATE: 50 {BEATS}/MIN
MDC_IDC_SET_BRADY_MAX_SENSOR_RATE: 115 {BEATS}/MIN
MDC_IDC_SET_BRADY_MAX_TRACKING_RATE: 130 {BEATS}/MIN
MDC_IDC_SET_BRADY_MODE: NORMAL
MDC_IDC_SET_BRADY_MODE: NORMAL
MDC_IDC_SET_BRADY_PAV_DELAY_LOW: 350 MS
MDC_IDC_SET_BRADY_SAV_DELAY_LOW: 350 MS
MDC_IDC_SET_LEADCHNL_RA_PACING_AMPLITUDE: 1.5 V
MDC_IDC_SET_LEADCHNL_RA_PACING_ANODE_ELECTRODE_1: NORMAL
MDC_IDC_SET_LEADCHNL_RA_PACING_ANODE_ELECTRODE_1: NORMAL
MDC_IDC_SET_LEADCHNL_RA_PACING_ANODE_LOCATION_1: NORMAL
MDC_IDC_SET_LEADCHNL_RA_PACING_ANODE_LOCATION_1: NORMAL
MDC_IDC_SET_LEADCHNL_RA_PACING_CAPTURE_MODE: NORMAL
MDC_IDC_SET_LEADCHNL_RA_PACING_CATHODE_ELECTRODE_1: NORMAL
MDC_IDC_SET_LEADCHNL_RA_PACING_CATHODE_ELECTRODE_1: NORMAL
MDC_IDC_SET_LEADCHNL_RA_PACING_CATHODE_LOCATION_1: NORMAL
MDC_IDC_SET_LEADCHNL_RA_PACING_CATHODE_LOCATION_1: NORMAL
MDC_IDC_SET_LEADCHNL_RA_PACING_POLARITY: NORMAL
MDC_IDC_SET_LEADCHNL_RA_PACING_POLARITY: NORMAL
MDC_IDC_SET_LEADCHNL_RA_PACING_PULSEWIDTH: 0.4 MS
MDC_IDC_SET_LEADCHNL_RA_SENSING_ANODE_ELECTRODE_1: NORMAL
MDC_IDC_SET_LEADCHNL_RA_SENSING_ANODE_ELECTRODE_1: NORMAL
MDC_IDC_SET_LEADCHNL_RA_SENSING_ANODE_LOCATION_1: NORMAL
MDC_IDC_SET_LEADCHNL_RA_SENSING_ANODE_LOCATION_1: NORMAL
MDC_IDC_SET_LEADCHNL_RA_SENSING_CATHODE_ELECTRODE_1: NORMAL
MDC_IDC_SET_LEADCHNL_RA_SENSING_CATHODE_ELECTRODE_1: NORMAL
MDC_IDC_SET_LEADCHNL_RA_SENSING_CATHODE_LOCATION_1: NORMAL
MDC_IDC_SET_LEADCHNL_RA_SENSING_CATHODE_LOCATION_1: NORMAL
MDC_IDC_SET_LEADCHNL_RA_SENSING_POLARITY: NORMAL
MDC_IDC_SET_LEADCHNL_RA_SENSING_POLARITY: NORMAL
MDC_IDC_SET_LEADCHNL_RA_SENSING_SENSITIVITY: 0.3 MV
MDC_IDC_SET_LEADCHNL_RA_SENSING_SENSITIVITY: 0.3 MV
MDC_IDC_SET_LEADCHNL_RV_PACING_AMPLITUDE: 2.25 V
MDC_IDC_SET_LEADCHNL_RV_PACING_ANODE_ELECTRODE_1: NORMAL
MDC_IDC_SET_LEADCHNL_RV_PACING_ANODE_ELECTRODE_1: NORMAL
MDC_IDC_SET_LEADCHNL_RV_PACING_ANODE_LOCATION_1: NORMAL
MDC_IDC_SET_LEADCHNL_RV_PACING_ANODE_LOCATION_1: NORMAL
MDC_IDC_SET_LEADCHNL_RV_PACING_CAPTURE_MODE: NORMAL
MDC_IDC_SET_LEADCHNL_RV_PACING_CATHODE_ELECTRODE_1: NORMAL
MDC_IDC_SET_LEADCHNL_RV_PACING_CATHODE_ELECTRODE_1: NORMAL
MDC_IDC_SET_LEADCHNL_RV_PACING_CATHODE_LOCATION_1: NORMAL
MDC_IDC_SET_LEADCHNL_RV_PACING_CATHODE_LOCATION_1: NORMAL
MDC_IDC_SET_LEADCHNL_RV_PACING_POLARITY: NORMAL
MDC_IDC_SET_LEADCHNL_RV_PACING_POLARITY: NORMAL
MDC_IDC_SET_LEADCHNL_RV_PACING_PULSEWIDTH: 0.4 MS
MDC_IDC_SET_LEADCHNL_RV_SENSING_ANODE_ELECTRODE_1: NORMAL
MDC_IDC_SET_LEADCHNL_RV_SENSING_ANODE_ELECTRODE_1: NORMAL
MDC_IDC_SET_LEADCHNL_RV_SENSING_ANODE_LOCATION_1: NORMAL
MDC_IDC_SET_LEADCHNL_RV_SENSING_ANODE_LOCATION_1: NORMAL
MDC_IDC_SET_LEADCHNL_RV_SENSING_CATHODE_ELECTRODE_1: NORMAL
MDC_IDC_SET_LEADCHNL_RV_SENSING_CATHODE_ELECTRODE_1: NORMAL
MDC_IDC_SET_LEADCHNL_RV_SENSING_CATHODE_LOCATION_1: NORMAL
MDC_IDC_SET_LEADCHNL_RV_SENSING_CATHODE_LOCATION_1: NORMAL
MDC_IDC_SET_LEADCHNL_RV_SENSING_POLARITY: NORMAL
MDC_IDC_SET_LEADCHNL_RV_SENSING_POLARITY: NORMAL
MDC_IDC_SET_LEADCHNL_RV_SENSING_SENSITIVITY: 0.3 MV
MDC_IDC_SET_LEADCHNL_RV_SENSING_SENSITIVITY: 0.3 MV
MDC_IDC_SET_ZONE_DETECTION_BEATS_DENOMINATOR: 40 {BEATS}
MDC_IDC_SET_ZONE_DETECTION_BEATS_DENOMINATOR: 40 {BEATS}
MDC_IDC_SET_ZONE_DETECTION_BEATS_NUMERATOR: 30 {BEATS}
MDC_IDC_SET_ZONE_DETECTION_BEATS_NUMERATOR: 30 {BEATS}
MDC_IDC_SET_ZONE_DETECTION_INTERVAL: 280 MS
MDC_IDC_SET_ZONE_DETECTION_INTERVAL: 280 MS
MDC_IDC_SET_ZONE_DETECTION_INTERVAL: 320 MS
MDC_IDC_SET_ZONE_DETECTION_INTERVAL: 320 MS
MDC_IDC_SET_ZONE_DETECTION_INTERVAL: 350 MS
MDC_IDC_SET_ZONE_DETECTION_INTERVAL: 400 MS
MDC_IDC_SET_ZONE_DETECTION_INTERVAL: 400 MS
MDC_IDC_SET_ZONE_TYPE: NORMAL
MDC_IDC_STAT_AT_BURDEN_PERCENT: 0 %
MDC_IDC_STAT_AT_BURDEN_PERCENT: 0 %
MDC_IDC_STAT_AT_DTM_END: NORMAL
MDC_IDC_STAT_AT_DTM_END: NORMAL
MDC_IDC_STAT_AT_DTM_START: NORMAL
MDC_IDC_STAT_AT_DTM_START: NORMAL
MDC_IDC_STAT_BRADY_AP_VP_PERCENT: 0 %
MDC_IDC_STAT_BRADY_AP_VP_PERCENT: 0 %
MDC_IDC_STAT_BRADY_AP_VS_PERCENT: 0.05 %
MDC_IDC_STAT_BRADY_AP_VS_PERCENT: 0.05 %
MDC_IDC_STAT_BRADY_AS_VP_PERCENT: 0.08 %
MDC_IDC_STAT_BRADY_AS_VP_PERCENT: 0.08 %
MDC_IDC_STAT_BRADY_AS_VS_PERCENT: 99.86 %
MDC_IDC_STAT_BRADY_AS_VS_PERCENT: 99.86 %
MDC_IDC_STAT_BRADY_DTM_END: NORMAL
MDC_IDC_STAT_BRADY_DTM_END: NORMAL
MDC_IDC_STAT_BRADY_DTM_START: NORMAL
MDC_IDC_STAT_BRADY_DTM_START: NORMAL
MDC_IDC_STAT_BRADY_RA_PERCENT_PACED: 0.06 %
MDC_IDC_STAT_BRADY_RA_PERCENT_PACED: 0.06 %
MDC_IDC_STAT_BRADY_RV_PERCENT_PACED: 0.09 %
MDC_IDC_STAT_BRADY_RV_PERCENT_PACED: 0.09 %
MDC_IDC_STAT_EPISODE_RECENT_COUNT: 0
MDC_IDC_STAT_EPISODE_RECENT_COUNT: 5
MDC_IDC_STAT_EPISODE_RECENT_COUNT: 5
MDC_IDC_STAT_EPISODE_RECENT_COUNT_DTM_END: NORMAL
MDC_IDC_STAT_EPISODE_RECENT_COUNT_DTM_START: NORMAL
MDC_IDC_STAT_EPISODE_TOTAL_COUNT: 0
MDC_IDC_STAT_EPISODE_TOTAL_COUNT: 1
MDC_IDC_STAT_EPISODE_TOTAL_COUNT: 2
MDC_IDC_STAT_EPISODE_TOTAL_COUNT: 2
MDC_IDC_STAT_EPISODE_TOTAL_COUNT: 8
MDC_IDC_STAT_EPISODE_TOTAL_COUNT: 8
MDC_IDC_STAT_EPISODE_TOTAL_COUNT_DTM_END: NORMAL
MDC_IDC_STAT_EPISODE_TOTAL_COUNT_DTM_START: NORMAL
MDC_IDC_STAT_EPISODE_TYPE: NORMAL
MDC_IDC_STAT_TACHYTHERAPY_ATP_DELIVERED_RECENT: 0
MDC_IDC_STAT_TACHYTHERAPY_ATP_DELIVERED_RECENT: 0
MDC_IDC_STAT_TACHYTHERAPY_ATP_DELIVERED_TOTAL: 0
MDC_IDC_STAT_TACHYTHERAPY_ATP_DELIVERED_TOTAL: 0
MDC_IDC_STAT_TACHYTHERAPY_RECENT_DTM_END: NORMAL
MDC_IDC_STAT_TACHYTHERAPY_RECENT_DTM_END: NORMAL
MDC_IDC_STAT_TACHYTHERAPY_RECENT_DTM_START: NORMAL
MDC_IDC_STAT_TACHYTHERAPY_RECENT_DTM_START: NORMAL
MDC_IDC_STAT_TACHYTHERAPY_SHOCKS_ABORTED_RECENT: 0
MDC_IDC_STAT_TACHYTHERAPY_SHOCKS_ABORTED_RECENT: 0
MDC_IDC_STAT_TACHYTHERAPY_SHOCKS_ABORTED_TOTAL: 0
MDC_IDC_STAT_TACHYTHERAPY_SHOCKS_ABORTED_TOTAL: 0
MDC_IDC_STAT_TACHYTHERAPY_SHOCKS_DELIVERED_RECENT: 0
MDC_IDC_STAT_TACHYTHERAPY_SHOCKS_DELIVERED_RECENT: 0
MDC_IDC_STAT_TACHYTHERAPY_SHOCKS_DELIVERED_TOTAL: 1
MDC_IDC_STAT_TACHYTHERAPY_SHOCKS_DELIVERED_TOTAL: 1
MDC_IDC_STAT_TACHYTHERAPY_TOTAL_DTM_END: NORMAL
MDC_IDC_STAT_TACHYTHERAPY_TOTAL_DTM_END: NORMAL
MDC_IDC_STAT_TACHYTHERAPY_TOTAL_DTM_START: NORMAL
MDC_IDC_STAT_TACHYTHERAPY_TOTAL_DTM_START: NORMAL
O2/TOTAL GAS SETTING VFR VENT: 21 %
OXYHGB MFR BLDV: 43 % (ref 70–75)
P AXIS - MUSE: 45 DEGREES
P AXIS - MUSE: 60 DEGREES
PCO2 BLDV: 35 MM HG (ref 40–50)
PH BLDV: 7.43 [PH] (ref 7.32–7.43)
PLATELET # BLD AUTO: 293 10E3/UL (ref 150–450)
PO2 BLDV: 26 MM HG (ref 25–47)
POTASSIUM BLD-SCNC: 3.5 MMOL/L (ref 3.4–5.3)
PR INTERVAL - MUSE: 220 MS
PR INTERVAL - MUSE: 228 MS
PROT SERPL-MCNC: 6.6 G/DL (ref 6.8–8.8)
QRS DURATION - MUSE: 154 MS
QRS DURATION - MUSE: 162 MS
QT - MUSE: 364 MS
QT - MUSE: 438 MS
QTC - MUSE: 455 MS
QTC - MUSE: 511 MS
R AXIS - MUSE: -42 DEGREES
R AXIS - MUSE: -45 DEGREES
RBC # BLD AUTO: 3.1 10E6/UL (ref 4.4–5.9)
SODIUM SERPL-SCNC: 135 MMOL/L (ref 133–144)
SYSTOLIC BLOOD PRESSURE - MUSE: NORMAL MMHG
SYSTOLIC BLOOD PRESSURE - MUSE: NORMAL MMHG
T AXIS - MUSE: 104 DEGREES
T AXIS - MUSE: 92 DEGREES
VENTRICULAR RATE- MUSE: 82 BPM
VENTRICULAR RATE- MUSE: 94 BPM
WBC # BLD AUTO: 4.8 10E3/UL (ref 4–11)

## 2022-05-31 PROCEDURE — 250N000013 HC RX MED GY IP 250 OP 250 PS 637: Performed by: STUDENT IN AN ORGANIZED HEALTH CARE EDUCATION/TRAINING PROGRAM

## 2022-05-31 PROCEDURE — 80053 COMPREHEN METABOLIC PANEL: CPT | Performed by: STUDENT IN AN ORGANIZED HEALTH CARE EDUCATION/TRAINING PROGRAM

## 2022-05-31 PROCEDURE — 250N000012 HC RX MED GY IP 250 OP 636 PS 637: Performed by: STUDENT IN AN ORGANIZED HEALTH CARE EDUCATION/TRAINING PROGRAM

## 2022-05-31 PROCEDURE — 82805 BLOOD GASES W/O2 SATURATION: CPT | Performed by: STUDENT IN AN ORGANIZED HEALTH CARE EDUCATION/TRAINING PROGRAM

## 2022-05-31 PROCEDURE — 85027 COMPLETE CBC AUTOMATED: CPT | Performed by: STUDENT IN AN ORGANIZED HEALTH CARE EDUCATION/TRAINING PROGRAM

## 2022-05-31 PROCEDURE — 36592 COLLECT BLOOD FROM PICC: CPT | Performed by: STUDENT IN AN ORGANIZED HEALTH CARE EDUCATION/TRAINING PROGRAM

## 2022-05-31 PROCEDURE — 85610 PROTHROMBIN TIME: CPT | Performed by: STUDENT IN AN ORGANIZED HEALTH CARE EDUCATION/TRAINING PROGRAM

## 2022-05-31 PROCEDURE — 97535 SELF CARE MNGMENT TRAINING: CPT | Mod: GO | Performed by: OCCUPATIONAL THERAPIST

## 2022-05-31 PROCEDURE — 99239 HOSP IP/OBS DSCHRG MGMT >30: CPT | Mod: GC | Performed by: INTERNAL MEDICINE

## 2022-05-31 RX ORDER — ENOXAPARIN SODIUM 100 MG/ML
70 INJECTION SUBCUTANEOUS 2 TIMES DAILY
Qty: 3 ML | Refills: 0 | Status: ON HOLD | COMMUNITY
Start: 2022-05-31 | End: 2022-06-17

## 2022-05-31 RX ORDER — POTASSIUM CHLORIDE 20MEQ/15ML
40 LIQUID (ML) ORAL ONCE
Status: COMPLETED | OUTPATIENT
Start: 2022-05-31 | End: 2022-05-31

## 2022-05-31 RX ORDER — WARFARIN SODIUM 5 MG/1
5 TABLET ORAL
Status: DISCONTINUED | OUTPATIENT
Start: 2022-05-31 | End: 2022-05-31 | Stop reason: HOSPADM

## 2022-05-31 RX ORDER — WARFARIN SODIUM 5 MG/1
5 TABLET ORAL DAILY
Qty: 30 TABLET | Refills: 3 | Status: ON HOLD | OUTPATIENT
Start: 2022-05-31 | End: 2022-09-01

## 2022-05-31 RX ADMIN — THIAMINE HCL TAB 100 MG 100 MG: 100 TAB at 07:52

## 2022-05-31 RX ADMIN — CLOPIDOGREL BISULFATE 75 MG: 75 TABLET ORAL at 07:52

## 2022-05-31 RX ADMIN — FUROSEMIDE 40 MG: 40 TABLET ORAL at 07:52

## 2022-05-31 RX ADMIN — HYDROXYCHLOROQUINE SULFATE 200 MG: 200 TABLET, FILM COATED ORAL at 07:52

## 2022-05-31 RX ADMIN — MYCOPHENOLATE MOFETIL 1500 MG: 500 TABLET, FILM COATED ORAL at 07:52

## 2022-05-31 RX ADMIN — POTASSIUM CHLORIDE 40 MEQ: 40 SOLUTION ORAL at 12:32

## 2022-05-31 RX ADMIN — FAMOTIDINE 20 MG: 20 TABLET ORAL at 07:52

## 2022-05-31 ASSESSMENT — ACTIVITIES OF DAILY LIVING (ADL)
ADLS_ACUITY_SCORE: 20

## 2022-05-31 NOTE — PROGRESS NOTES
1900  Report received from Chandni LONDON  1930  Pt assessed, pt denies pain, SOB or distress.  Pt is A and O x 4, all VSS WNL, afebrile, watching TV.  2000  Meds given, pt would like his Melatonin at 2100  2100  Melatonin with ambien given for sleep  2200  Pt req additional med to help with sleep-atarax given  0000  Sleeping  0200  Sleeping  0300-call from tele monitor-20 sec run of idioventricular rhythm, no change in rate  0315  Cardiology paged  0331  Cardiology called back-no orders  0500  Sleeping  0700  No issues-no nausea-call from treatment team, pt to be dc'd today if no more nausea

## 2022-05-31 NOTE — DISCHARGE SUMMARY
Take your medicines every day, as directed       Monitor Your Weight and Symptoms    Contact us if you:      Gain 2 pounds in one day or 5 pounds in one week    Feel more short of breath    Notice more leg swelling    Feel lightheadeded   Change your lifestyle    Limit Salt or Sodium:    2000 mg  Limit Fluids:    2000 mL or approximately 64 ounces  Eat a Heart Healthy Diet    Low in saturated fats  Stay Active:    Aim to move at least 150 minutes every  week         To Contact us    During Business Hours:  [phone], option # 1      After hours, weekends or holidays:   [phone], Option #4  Ask to speak to the On-Call Cardiologist. Inform them you are a CORE/heart failure patient at the [facility].     Use [employer] allows you to communicate directly with your heart team through secure messaging.    [facility] [facility] can be accessed any time on your phone, computer, or tablet.    If you need assistance, we'd be happy to help!         Keep your Heart Appointments:    6/8/22-9:00 Am appointment with Dr. Stewart. Clinic appointment in [place], [place]

## 2022-05-31 NOTE — CARE PLAN
Occupational Therapy and Cardiac Rehab Discharge Summary    Reason for therapy discharge:    Discharged to home with outpatient therapy.    Progress towards therapy goal(s). See goals on Care Plan in [facility] electronic health record for goal details.  Goals partially met.  Barriers to achieving goals:   discharge from facility.    Therapy recommendation(s):    Continued therapy is recommended.  Rationale/Recommendations:  OP CR to promote cardiovascular health, strength, and endurance for improved ADL/IADL independence and safety..

## 2022-05-31 NOTE — PLAN OF CARE
DISCHARGE 5/31/2022  1:52 PM  ----------------------------------------------------------------------------  Discharged to: Home  Via: private transportation  Accompanied by: Family  Discharge Instructions: 2g Na diet, activity as tolerated, medications, follow up appointments, when to call the MD, aftercare instructions.  Prescriptions: To be filled by discharge pharmacy; medication list reviewed & sent with pt  Follow Up Appointments: arranged; information given  Belongings: All sent with pt  IV: d/c'd  Telemetry: d/c'd  Pt exhibits understanding of above discharge instructions; all questions answered.    Discharge Paperwork: Signed, copied, and sent home with patient.

## 2022-05-31 NOTE — PLAN OF CARE
"  Problem: Plan of Care - These are the overarching goals to be used throughout the patient stay.    Goal: Plan of Care Review/Shift Note  Description: The Plan of Care Review/Shift note should be completed every shift.  The Outcome Evaluation is a brief statement about your assessment that the patient is improving, declining, or no change.  This information will be displayed automatically on your shift note.  Outcome: Ongoing, Progressing  Goal: Patient-Specific Goal (Individualized)  Description: You can add care plan individualizations to a care plan. Examples of Individualization might be:  \"Parent requests to be called daily at 9am for status\", \"I have a hard time hearing out of my right ear\", or \"Do not touch me to wake me up as it startles me\".  Outcome: Ongoing, Progressing  Goal: Absence of Hospital-Acquired Illness or Injury  Outcome: Ongoing, Progressing  Intervention: Prevent Skin Injury  Recent Flowsheet Documentation  Taken 5/30/2022 2000 by Amaury Cool, RN  Body Position: position changed independently  Intervention: Prevent and Manage VTE (Venous Thromboembolism) Risk  Recent Flowsheet Documentation  Taken 5/30/2022 2000 by Amaury Cool, RN  Activity Management: activity adjusted per tolerance  Goal: Optimal Comfort and Wellbeing  Outcome: Ongoing, Progressing  Goal: Readiness for Transition of Care  Outcome: Ongoing, Progressing     Problem: Cardiac Output Decreased (Heart Failure)  Goal: Optimal Cardiac Output  Outcome: Ongoing, Progressing     Problem: Fluid Imbalance (Heart Failure)  Goal: Fluid Balance  Outcome: Ongoing, Progressing     Problem: Hypertension Comorbidity  Goal: Blood Pressure in Desired Range  Outcome: Ongoing, Progressing   Goal Outcome Evaluation:                      "

## 2022-05-31 NOTE — CONSULTS
61 year old male presenting with Cardiogenic Shock. CORE consult requested. Patient  is currently admitted with HF.    Patient was provided with a CHF book.  I reviewed the importance of daily weights, 2 gm sodium diet, 2L fluid restriction and compliance with medications upon hospital discharge.  CORE contact phone numbers provided and patient is encouraged to call with any questions or concerns, including any weight gain or loss of 2 or more pounds in 24 hours or 5 or more pounds in 1 week.      Appointment made with Dr. Stewart on 22. .  I will follow-up with the patient at that time, sooner if needed.  Thank you for the consult.    Woody Charles RN  Cardiology Care Coordinator - [facility]  Questions and [phone]

## 2022-05-31 NOTE — PROGRESS NOTES
Calorie Count  Intake recorded for: 5/30  Total Kcals: 471 Total Protein: 24g  Kcals from Hospital Food: 471   Protein: 24g  Kcals from Outside Food (average):0 Protein: 0g  # Meals Ordered from Kitchen: 2 meals  # Meals Recorded: 2 meals (First - 100% raisin bran, 4oz 1% milk, 4oz orange juice)      (Second - 100% pot roast w/ gravy, mashed potatoes w/ gravy)  # Supplements Recorded: 0

## 2022-06-06 ENCOUNTER — DOCUMENTATION ONLY (OUTPATIENT)
Dept: OTHER | Facility: CLINIC | Age: 62
End: 2022-06-06

## 2022-06-07 NOTE — PROGRESS NOTES
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    Regarding his cardiac history, he underwent LAD stenting first in 2005, STEMI in 2007.  This was complicated by ischemic cardiomyopathy and eventually had a CRT-D ([employer]tr[employer] 2006, gen change 2012) placed for a left bundle branch block and persistently low LVEF despite medical therapy. He has had various degrees of mitral regurgitation which has been managed medically for quite some time. Recently he was admitted after having COVID-19 for respiratory distress. It was unclear whether this initially was related to pneumonia or decompensated heart failure. After a prolonged hospitalization and extensive testing it was felt that this was related to decompensated heart failure with progression of his MR. Due to persistent hypotension he was taken off most of his GDMT. He was seen HF who felt that the MR was the primary  of his decompensation.  He was eventually discharged with referral for mitraclip eval.       He had coronary angiogram on 5/9/22 showing severe ostial LAD disease with in-stent restenosis of the mid LAD involving a diagonal bifurcation, mild LCx disease, and severe proximal RCA disease. LVEDP was 25.  TAVARES with anesthesia showed LVEF 30-35%, severely dilated LA, moderate to severe functional MR with multiple jets and apically tethered leaflets (he was hypotensive during the procedure SBP 70s). Due to his age, low LVEF, and multivessel CAD he was admitted that day for surgical evaluation.  CTS at [place] felt he would benefit from CABG + MVR but  that he should be at a center with ECMO/VAD back up.    Otherwise at the OSH, he was diursed about 3kg and transitioned to to oral lasix.  He was taken off warfarin and started on Lovenox as a bridge, and also Plavix was held in anticipation of surgery.  Hospital course was otherwise complicated by epistaxis requiring cautery by ENT, and a headache (CTH 5/14 negative per report, neuro felt to be migraines). Discharged 5/25.    Readmitted 5/27 for nausea and vomiting, inability to eat and was found to be hypotensive at OSH ED. He was only able to take his evening medication on the day of discharge and patient called son today to let him know he was vomiting. Per patient it was NBNB and reports he has decreased appetite. He reports no abdominal pain at this time, denies abd distention, diarrhea or constipation. He reports not able to sleep since his discharge and currently feeling tired after his benzo medication given during in-route from OSH ED to [facility] ED. Son reports that he has only urinated once today. Patient lives by himself and children 15mins away. He has chronic SOB with exertion but denies any recent worsening dyspnea, PND, orthopnea or peripheral edema. He denies any chest pain, fever or chills.  During this last admission he did undergo repeat coronary angiogram which showed multivessel coronary artery disease however understands no further interventions were made at that time.  He also had a right heart catheterization which showed minimally elevated filling pressures and borderline cardiac index however no clear evidence of cardiogenic shock.  During the admission medications were adjusted, from Brilinta we will switch back to Plavix and ultimately his nausea resolved and was able to tolerate food before discharge.  Today I am seeing him in hospital follow-up.  Notes that since his discharge he has been doing relatively well.  He has been gaining some weight which he thinks is probably fluid weight  because he got more short of breath does not have PND orthopnea a.  No episodes of chest pain or other complaints at this time.  He is appetite is better and he is able to eat more especially chicken however he does struggle a little bit with beef and other meat products.  Simple sugars are fine without any issues.  Takes her medications as prescribed without any side effects.  No other complaints at this time.    SOCIAL HISTORY:  Social History     Socioeconomic History     Marital status: Single     CURRENT MEDICATIONS:  Current Outpatient Medications   Medication     atorvastatin (LIPITOR) 40 MG tablet     clopidogrel (PLAVIX) 75 MG tablet     DULoxetine (CYMBALTA) 30 MG capsule     enoxaparin ANTICOAGULANT (LOVENOX) 80 MG/0.8ML syringe     famotidine (PEPCID) 20 MG tablet     ferrous sulfate (FE TABS) 325 (65 Fe) MG EC tablet     furosemide (LASIX) 40 MG tablet     hydroxychloroquine (PLAQUENIL) 200 MG tablet     hydrOXYzine (ATARAX) 25 MG tablet     LORazepam (ATIVAN) 0.5 MG tablet     melatonin 3 MG tablet     mycophenolate (GENERIC EQUIVALENT) 500 MG tablet     potassium chloride ER (KLOR-CON M) 20 MEQ CR tablet     promethazine (PHENERGAN) 12.5 MG tablet     tamsulosin (FLOMAX) 0.4 MG capsule     thiamine (B-1) 100 MG tablet     warfarin ANTICOAGULANT (COUMADIN) 5 MG tablet     zolpidem (AMBIEN) 5 MG tablet     No current facility-administered medications for this visit.     ROS:   Constitutional: No fever, chills, or sweats. Weight is 0 lbs 0 oz  ENT: No visual disturbance, ear ache, epistaxis, sore throat.   Allergies/Immunologic: Negative.   Respiratory: No cough, hemoptysis.   Cardiovascular: As per HPI.   GI: No nausea, vomiting, hematemesis, melena, or hematochezia.   : No urinary frequency, dysuria, or hematuria.   Integument: Negative.   Psychiatric: Pleasant, no major depression noted  Neuro: No focal neurological deficits noted  Endocrinology: Negative.   Musculoskeletal: As per HPI.       EXAM:  Blood pressure today is 90/62 mmHg, heart rate is 90 bpm weight is 150 pounds at home and 155 pounds here.  Oxygen saturation is 99% on room air and afebrile  General: appears comfortable, alert and oriented  Head: normocephalic, atraumatic  Eyes: anicteric sclera, EOMI , PERRL  Neck: no adenopathy  Orophyarynx: moist mucosa, no lesions noted  Heart: regular, S1/S2, no murmurs, rubs or gallop. Estimated JVP at 5 cmH2O  Lungs: CTAB, No wheezing.   Abdomen: soft, non-tender, bowel sounds present, no hepatosplenomegaly  Extremities: No LE edema today  Skin: no open lesions noted  Neuro: grossly non-focal     Labs:  Lab Results   Component Value Date    WBC 4.8 05/31/2022    HGB 8.7 (L) 05/31/2022    HCT 27.7 (L) 05/31/2022     05/31/2022     05/31/2022    POTASSIUM 3.5 05/31/2022    CHLORIDE 104 05/31/2022    CO2 21 05/31/2022    BUN 11 05/31/2022    CR 0.87 05/31/2022    GLC 95 05/31/2022    NTBNPI 2,006 (H) 05/27/2022    AST 14 05/31/2022    ALT 26 05/31/2022    ALKPHOS 67 05/31/2022    BILITOTAL 0.5 05/31/2022    INR 2.22 (H) 05/31/2022     TTE 5/17/22:  Severe left ventricular dilation is present. Left ventricular function is decreased. The ejection fraction is 10-15% (severely reduced). Severe diffuse hypokinesis is present.  Mild right ventricular dilation is present. Global right ventricular function is mildly reduced.  Severe functional mitral insufficiency is present.  Pulmonary hypertension is present. Estimated pulmonary artery systolic pressure is 44 mmHg plus right atrial pressure.   Dilation of the inferior vena cava is present with abnormal respiratory variation in diameter. Mean RA pressure estiamted at 15 mmHg. No pericardial effusion is present.    CMRI 5/18/22:  1. The left ventricle is severely dilated with apical wall thinning and akinesis. There is severe global hypokinesis. The global systolic function is severely reduced. The LVEF is 20%.  2. The right ventricle is normal  in cavity size. The global systolic function is moderately reduced. The  RVEF is 34%. ICD device seen in the right heart.   3. The right atrium is normal in size and the left atrium is severely enlarged.  4. There is atleast moderate functional mitral regurgitation. There is mild aortic, tricuspid and pulmonic regurgitation.   5. There is transmural late gadolinium enhancement involving the mid anterior, mid anteroseptal, all apical  segments including the true apex consistent with prior myocardial infarction in the left anterior descending artery territory.    6. There is no pericardial effusion.  7. There is no apical LV thrombus.   8. There are small bilateral pleural effusions.   CONCLUSIONS: Ischemic cardiomyopathy with prior anteroseptal infarction. Severely dilated left ventricle with severely reduced systolic function, LVEF 20%. Normal right ventricular size with moderately reduced systolic function, RVEF 34%. Functional mitral regurgitation of at least moderate severity.     [facility] 5/18/22:  MAP: 84 mmHg  RA: 7/10/6mmHg  RV: 55/7 mmHg  PA: 55/30/38 mmHg  W: 23/40/21 mmHg  PA sat: 33.2%  Marley CO/CI: 2.86/1.51  Thermo CO/CI: 2.9/1.6  Nipride 1.5  MAP: 72 mmHg  PA: 40/19/26 mmHg  Wedge: 16/20/16 mmHg  PA Sat: 55%  Marley CO/CI: 4.45/2.4    C/RHC 5/24/22:    Right sided filling pressures are mildly elevated.    Moderately elevated pulmonary artery hypertension.    Left sided filling pressures are moderately elevated.    Left ventricular filling pressures are severely elevated.    Normal cardiac output level.    Hemodynamic data has been modified in [facility] per physician review.    Dist LAD lesion is 80% stenosed.    Prox LAD to Mid LAD lesion is 70% stenosed.    Ost LAD lesion is 90% stenosed.    Mid LM to Dist LM lesion is 40% stenosed.    Prox RCA lesion is 70% stenosed.    C/RHC 5/29/22:  Patent stents, no interventions made  RA 6 mmHg  RV 45/6 mmHg  PA 54/24 mmHg (34)  PCWP 20 mmHg  MAP 81  PA Sat 50%  CI=2.00    MARIBEL CO=3.54  TDCO= 3.67   PVR=3.95 Woods  PBF=3887 dynes      LVAD/Transplant Evaluation Checklist  [x]????? labs (CBC, CMP, PT/INR, cystatin C, prealbumin, UA + micro)  [x]????? Infectious (Hep A/B/C, HIV, treponema, HSV 1/2 IgG, CMV IgG, Toxo IgG, EBV IgG, varicella IgG, Quant gold, COVID vaccine/PCR)  [x]????? Utox/nicotine and cotinine/PeTH   []????? Immunocompatibility (last transfusion, ABO, HLA tissue typing, PRA): in process  [x]????? ECG, Echo  []????? CPX - can be oupatient  []????? 6MWT - can be outpatient  [x]????? RHC, completed but needs repeat in 2-3 months to assess PVR  [x]????? CVTS consult  [x]????? Social work consult  [x]?????  Palliative care consult: final note pending  [x]????? Neuropsych consult: order placed: final note pending  [x]????? Nutrition consult  [x]????? CT Dental   [x]????? Dental consult  [x]????? Abd US + doppler  - Abd US complete done, not with doppler; OK given unremarkable CT CAP  [x]????? Extremity US and ABIs  [x]????? Carotid US (if DM or ICM or >49yo)  []????? PFTs: outpatient  []????? Dexa: outpatient  [x]????? CT head non-contrast  [x]????? CT CAP non-contrast  [x]????? Colonoscopy (>49 yo)  [x]????? PSA/HCG    ASSESSMENT AND PLAN:  In summary, patient is a 61 year old male with a past medical history including ischemic cardiomyopathy status post recent PCI at [facility] with return trip to the [place] for consult for cardiogenic shock.  Repeat right heart catheterization and invasive hemodynamic evaluation showed normal filling pressures and reduced cardiac output however relatively stable from prior with any output around 2 L/min.  As such the decision was made to try to optimize medical therapy at this time and defer advanced therapies although evaluation has been completed for transplantation primarily.  Today he notes that he is relatively well gained some weight which she thinks is food weight and not short of breath.  No further episodes of  chest pain.  Takes her medications and tolerates them well.  He actually looks significantly better than he did in the hospital.  At this time I feel that titrating medical therapy is difficult for him given his low cardiac output and marginal blood pressure which was 90/62 mmHg here today.  Although he was warm.  He was dry and as such he is going to continue the Lasix 40 mg once a day noting that if he feels more short of breath he gains more weight significantly in the next couple of weeks and he is going to update to 40 mg twice daily.  Again I do not know if I can increase medications at this point.  We discussed that SGLT2 inhibitors may be an option however he is heavily immunosuppressed at this point.  MMF 1500 mg twice daily and he is very worried about infection and as such he would like to defer.  This is understandable  No other medication changes today and we will set him up in a month month and a half at the [facility] for repeat right heart catheterization with special focus on PVR, and also we will try to perform PFTs 6-minute walk test and CPX at that time.  Again this we will plan to do at the [facility].     I appreciate the opportunity to participate in the care of Dandy Sands . Please do not hesitate to contact me with any further questions.    Sincerely,   Justo Stewart MD  [facility] Division of Cardiology

## 2022-06-08 ENCOUNTER — OFFICE VISIT (OUTPATIENT)
Dept: CARDIOLOGY | Facility: OTHER | Age: 62
End: 2022-06-08
Payer: COMMERCIAL

## 2022-06-08 DIAGNOSIS — I10 BENIGN ESSENTIAL HYPERTENSION: Primary | ICD-10-CM

## 2022-06-08 DIAGNOSIS — I50.20 HEART FAILURE WITH REDUCED EJECTION FRACTION, NYHA CLASS III (H): ICD-10-CM

## 2022-06-08 DIAGNOSIS — I48.0 PAROXYSMAL ATRIAL FIBRILLATION (H): ICD-10-CM

## 2022-06-08 DIAGNOSIS — I25.10 CORONARY ARTERY DISEASE INVOLVING NATIVE CORONARY ARTERY OF NATIVE HEART WITHOUT ANGINA PECTORIS: ICD-10-CM

## 2022-06-08 DIAGNOSIS — E78.2 MIXED HYPERLIPIDEMIA: ICD-10-CM

## 2022-06-08 DIAGNOSIS — I25.5 ISCHEMIC CARDIOMYOPATHY: ICD-10-CM

## 2022-06-08 PROCEDURE — 99215 OFFICE O/P EST HI 40 MIN: CPT | Performed by: INTERNAL MEDICINE

## 2022-06-08 NOTE — NURSING NOTE
Chief Complaint   Patient presents with     New Patient     Heart Failure      Vitals taken by Clifton LONDON.     Eddie Dumont, JAMES

## 2022-06-08 NOTE — PATIENT INSTRUCTIONS
You were seen today by [facility] Advanced Heart Failure Cardiologist, Dr. Stewart, in partnership with [facility] in [place], [place]       Medication or Plan of Care changes:        Follow up:          Questions:    For concerns or questions regarding your heart care please contact your [facility] Team at [phone]. If there are any questions specifically about this visit please call [employer] at  [phone].    Follow the American Heart Association Diet and Lifestyle recommendations:    Limit saturated fat, trans fat, sodium, red meat, sweets and sugar-sweetened beverages.   If you choose to eat red meat, compare labels and select the leanest cuts available.  Aim for at least 150 minutes of moderate physical activity or 75 minutes of vigorous physical activity - or an equal combination of both - each week.

## 2022-06-08 NOTE — LETTER
6/8/2022      RE: Dandy Sands  [address]  [place] [address]       Dear Colleague,    Thank you for the opportunity to participate in the care of your patient, Dandy Sands, at the [facility] HEART SERVICES [place] at [facility]. Please see a copy of my visit note below.    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    Regarding his cardiac history, he underwent LAD stenting first in 2005, STEMI in 2007.  This was complicated by ischemic cardiomyopathy and eventually had a CRT-D (Medtronic 2006, gen change 2012) placed for a left bundle branch block and persistently low LVEF despite medical therapy. He has had various degrees of mitral regurgitation which has been managed medically for quite some time. Recently he was admitted after having COVID-19 for respiratory distress. It was unclear whether this initially was related to pneumonia or decompensated heart failure. After a prolonged hospitalization and extensive testing it was felt that this was related to decompensated heart failure with progression of his MR. Due to persistent hypotension he was taken off most of his GDMT. He was seen HF who felt that the MR was the primary  of his decompensation.  He was eventually discharged with referral for mitraclip eval.       He had coronary angiogram on 5/9/22 showing severe ostial LAD disease with in-stent restenosis of the mid LAD involving a diagonal bifurcation, mild LCx disease, and severe proximal RCA disease. LVEDP was 25.  TAVARES with  anesthesia showed LVEF 30-35%, severely dilated LA, moderate to severe functional MR with multiple jets and apically tethered leaflets (he was hypotensive during the procedure SBP 70s). Due to his age, low LVEF, and multivessel CAD he was admitted that day for surgical evaluation.  CTS at [facility] felt he would benefit from CABG + MVR but that he should be at a center with ECMO/VAD back up.    Otherwise at the OSH, he was diursed about 3kg and transitioned to to oral lasix.  He was taken off warfarin and started on Lovenox as a bridge, and also Plavix was held in anticipation of surgery.  Hospital course was otherwise complicated by epistaxis requiring cautery by ENT, and a headache (CTH 5/14 negative per report, neuro felt to be migraines). Discharged 5/25.    Readmitted 5/27 for nausea and vomiting, inability to eat and was found to be hypotensive at OSH ED. He was only able to take his evening medication on the day of discharge and patient called son today to let him know he was vomiting. Per patient it was NBNB and reports he has decreased appetite. He reports no abdominal pain at this time, denies abd distention, diarrhea or constipation. He reports not able to sleep since his discharge and currently feeling tired after his benzo medication given during in-route from OSH ED to [facility] ED. Son reports that he has only urinated once today. Patient lives by himself and children 15mins away. He has chronic SOB with exertion but denies any recent worsening dyspnea, PND, orthopnea or peripheral edema. He denies any chest pain, fever or chills.  During this last admission he did undergo repeat coronary angiogram which showed multivessel coronary artery disease however understands no further interventions were made at that time.  He also had a right heart catheterization which showed minimally elevated filling pressures and borderline cardiac index however no clear evidence of cardiogenic shock.  During the admission  medications were adjusted, from Brilinta we will switch back to Plavix and ultimately his nausea resolved and was able to tolerate food before discharge.  Today I am seeing him in hospital follow-up.  Notes that since his discharge he has been doing relatively well.  He has been gaining some weight which he thinks is probably fluid weight because he got more short of breath does not have PND orthopnea a.  No episodes of chest pain or other complaints at this time.  He is appetite is better and he is able to eat more especially chicken however he does struggle a little bit with beef and other meat products.  Simple sugars are fine without any issues.  Takes her medications as prescribed without any side effects.  No other complaints at this time.    SOCIAL HISTORY:  Social History     Socioeconomic History     Marital status: Single     CURRENT MEDICATIONS:  Current Outpatient Medications   Medication     atorvastatin (LIPITOR) 40 MG tablet     clopidogrel (PLAVIX) 75 MG tablet     DULoxetine (CYMBALTA) 30 MG capsule     enoxaparin ANTICOAGULANT (LOVENOX) 80 MG/0.8ML syringe     famotidine (PEPCID) 20 MG tablet     ferrous sulfate (FE TABS) 325 (65 Fe) MG EC tablet     furosemide (LASIX) 40 MG tablet     hydroxychloroquine (PLAQUENIL) 200 MG tablet     hydrOXYzine (ATARAX) 25 MG tablet     LORazepam (ATIVAN) 0.5 MG tablet     melatonin 3 MG tablet     mycophenolate (GENERIC EQUIVALENT) 500 MG tablet     potassium chloride ER (KLOR-CON M) 20 MEQ CR tablet     promethazine (PHENERGAN) 12.5 MG tablet     tamsulosin (FLOMAX) 0.4 MG capsule     thiamine (B-1) 100 MG tablet     warfarin ANTICOAGULANT (COUMADIN) 5 MG tablet     zolpidem (AMBIEN) 5 MG tablet     No current facility-administered medications for this visit.     ROS:   Constitutional: No fever, chills, or sweats. Weight is 0 lbs 0 oz  ENT: No visual disturbance, ear ache, epistaxis, sore throat.   Allergies/Immunologic: Negative.   Respiratory: No cough,  hemoptysis.   Cardiovascular: As per HPI.   GI: No nausea, vomiting, hematemesis, melena, or hematochezia.   : No urinary frequency, dysuria, or hematuria.   Integument: Negative.   Psychiatric: Pleasant, no major depression noted  Neuro: No focal neurological deficits noted  Endocrinology: Negative.   Musculoskeletal: As per HPI.      EXAM:  Blood pressure today is 90/62 mmHg, heart rate is 90 bpm weight is 150 pounds at home and 155 pounds here.  Oxygen saturation is 99% on room air and afebrile  General: appears comfortable, alert and oriented  Head: normocephalic, atraumatic  Eyes: anicteric sclera, EOMI , PERRL  Neck: no adenopathy  Orophyarynx: moist mucosa, no lesions noted  Heart: regular, S1/S2, no murmurs, rubs or gallop. Estimated JVP at 5 cmH2O  Lungs: CTAB, No wheezing.   Abdomen: soft, non-tender, bowel sounds present, no hepatosplenomegaly  Extremities: No LE edema today  Skin: no open lesions noted  Neuro: grossly non-focal     Labs:  Lab Results   Component Value Date    WBC 4.8 05/31/2022    HGB 8.7 (L) 05/31/2022    HCT 27.7 (L) 05/31/2022     05/31/2022     05/31/2022    POTASSIUM 3.5 05/31/2022    CHLORIDE 104 05/31/2022    CO2 21 05/31/2022    BUN 11 05/31/2022    CR 0.87 05/31/2022    GLC 95 05/31/2022    NTBNPI 2,006 (H) 05/27/2022    AST 14 05/31/2022    ALT 26 05/31/2022    ALKPHOS 67 05/31/2022    BILITOTAL 0.5 05/31/2022    INR 2.22 (H) 05/31/2022     TTE 5/17/22:  Severe left ventricular dilation is present. Left ventricular function is decreased. The ejection fraction is 10-15% (severely reduced). Severe diffuse hypokinesis is present.  Mild right ventricular dilation is present. Global right ventricular function is mildly reduced.  Severe functional mitral insufficiency is present.  Pulmonary hypertension is present. Estimated pulmonary artery systolic pressure is 44 mmHg plus right atrial pressure.   Dilation of the inferior vena cava is present with abnormal  respiratory variation in diameter. Mean RA pressure estiamted at 15 mmHg. No pericardial effusion is present.    CMRI 5/18/22:  1. The left ventricle is severely dilated with apical wall thinning and akinesis. There is severe global hypokinesis. The global systolic function is severely reduced. The LVEF is 20%.  2. The right ventricle is normal in cavity size. The global systolic function is moderately reduced. The  RVEF is 34%. ICD device seen in the right heart.   3. The right atrium is normal in size and the left atrium is severely enlarged.  4. There is atleast moderate functional mitral regurgitation. There is mild aortic, tricuspid and pulmonic regurgitation.   5. There is transmural late gadolinium enhancement involving the mid anterior, mid anteroseptal, all apical  segments including the true apex consistent with prior myocardial infarction in the left anterior descending artery territory.    6. There is no pericardial effusion.  7. There is no apical LV thrombus.   8. There are small bilateral pleural effusions.   CONCLUSIONS: Ischemic cardiomyopathy with prior anteroseptal infarction. Severely dilated left ventricle with severely reduced systolic function, LVEF 20%. Normal right ventricular size with moderately reduced systolic function, RVEF 34%. Functional mitral regurgitation of at least moderate severity.     RHC 5/18/22:  MAP: 84 mmHg  RA: 7/10/6mmHg  RV: 55/7 mmHg  PA: 55/30/38 mmHg  W: 23/40/21 mmHg  PA sat: 33.2%  Marley CO/CI: 2.86/1.51  Thermo CO/CI: 2.9/1.6  Nipride 1.5  MAP: 72 mmHg  PA: 40/19/26 mmHg  Wedge: 16/20/16 mmHg  PA Sat: 55%  Marley CO/CI: 4.45/2.4    LHC/RHC 5/24/22:    Right sided filling pressures are mildly elevated.    Moderately elevated pulmonary artery hypertension.    Left sided filling pressures are moderately elevated.    Left ventricular filling pressures are severely elevated.    Normal cardiac output level.    Hemodynamic data has been modified in Epic per physician  review.    Dist LAD lesion is 80% stenosed.    Prox LAD to Mid LAD lesion is 70% stenosed.    Ost LAD lesion is 90% stenosed.    Mid LM to Dist LM lesion is 40% stenosed.    Prox RCA lesion is 70% stenosed.    LHC/RHC 5/29/22:  Patent stents, no interventions made  RA 6 mmHg  RV 45/6 mmHg  PA 54/24 mmHg (34)  PCWP 20 mmHg  MAP 81  PA Sat 50%  CI=2.00   FICKS CO=3.54  TDCO= 3.67   PVR=3.95 Woods  BDL=0495 dynes      LVAD/Transplant Evaluation Checklist  [x]????? labs (CBC, CMP, PT/INR, cystatin C, prealbumin, UA + micro)  [x]????? Infectious (Hep A/B/C, HIV, treponema, HSV 1/2 IgG, CMV IgG, Toxo IgG, EBV IgG, varicella IgG, Quant gold, COVID vaccine/PCR)  [x]????? Utox/nicotine and cotinine/PeTH   []????? Immunocompatibility (last transfusion, ABO, HLA tissue typing, PRA): in process  [x]????? ECG, Echo  []????? CPX - can be oupatient  []????? 6MWT - can be outpatient  [x]????? RHC, completed but needs repeat in 2-3 months to assess PVR  [x]????? CVTS consult  [x]????? Social work consult  [x]?????  Palliative care consult: final note pending  [x]????? Neuropsych consult: order placed: final note pending  [x]????? Nutrition consult  [x]????? CT Dental   [x]????? Dental consult  [x]????? Abd US + doppler  - Abd US complete done, not with doppler; OK given unremarkable CT CAP  [x]????? Extremity US and ABIs  [x]????? Carotid US (if DM or ICM or >49yo)  []????? PFTs: outpatient  []????? Dexa: outpatient  [x]????? CT head non-contrast  [x]????? CT CAP non-contrast  [x]????? Colonoscopy (>51 yo)  [x]????? PSA/HCG    ASSESSMENT AND PLAN:  In summary, patient is a 61 year old male with a past medical history including ischemic cardiomyopathy status post recent PCI at [facility] with return trip to the [place] for consult for cardiogenic shock.  Repeat right heart catheterization and invasive hemodynamic evaluation showed normal filling pressures and reduced cardiac output however relatively stable from prior  with any output around 2 L/min.  As such the decision was made to try to optimize medical therapy at this time and defer advanced therapies although evaluation has been completed for transplantation primarily.  Today he notes that he is relatively well gained some weight which she thinks is food weight and not short of breath.  No further episodes of chest pain.  Takes her medications and tolerates them well.  He actually looks significantly better than he did in the hospital.  At this time I feel that titrating medical therapy is difficult for him given his low cardiac output and marginal blood pressure which was 90/62 mmHg here today.  Although he was warm.  He was dry and as such he is going to continue the Lasix 40 mg once a day noting that if he feels more short of breath he gains more weight significantly in the next couple of weeks and he is going to update to 40 mg twice daily.  Again I do not know if I can increase medications at this point.  We discussed that SGLT2 inhibitors may be an option however he is heavily immunosuppressed at this point.  MMF 1500 mg twice daily and he is very worried about infection and as such he would like to defer.  This is understandable  No other medication changes today and we will set him up in a month month and a half at the [facility] for repeat right heart catheterization with special focus on PVR, and also we will try to perform PFTs 6-minute walk test and CPX at that time.  Again this we will plan to do at the [facility].     I appreciate the opportunity to participate in the care of Dandy Sands . Please do not hesitate to contact me with any further questions.    Sincerely,   Justo Stewart MD  [facility] Division of Cardiology

## 2022-06-14 ENCOUNTER — CARE COORDINATION (OUTPATIENT)
Dept: CARDIOLOGY | Facility: CLINIC | Age: 62
End: 2022-06-14

## 2022-06-14 DIAGNOSIS — I50.20 HEART FAILURE WITH REDUCED EJECTION FRACTION, NYHA CLASS III (H): Primary | ICD-10-CM

## 2022-06-14 DIAGNOSIS — I25.10 CORONARY ARTERY DISEASE INVOLVING NATIVE CORONARY ARTERY OF NATIVE HEART WITHOUT ANGINA PECTORIS: ICD-10-CM

## 2022-06-14 DIAGNOSIS — I51.89 OTHER ILL-DEFINED HEART DISEASES: ICD-10-CM

## 2022-06-14 DIAGNOSIS — I50.9 DECOMPENSATED HEART FAILURE (H): ICD-10-CM

## 2022-06-14 DIAGNOSIS — I25.5 ISCHEMIC CARDIOMYOPATHY: ICD-10-CM

## 2022-06-14 RX ORDER — POTASSIUM CHLORIDE 1500 MG/1
20 TABLET, EXTENDED RELEASE ORAL
Status: CANCELLED | OUTPATIENT
Start: 2022-06-14

## 2022-06-14 RX ORDER — POTASSIUM CHLORIDE 1500 MG/1
40 TABLET, EXTENDED RELEASE ORAL
Status: CANCELLED | OUTPATIENT
Start: 2022-06-14

## 2022-06-14 RX ORDER — ASPIRIN 325 MG
325 TABLET ORAL ONCE
Status: CANCELLED | OUTPATIENT
Start: 2022-06-14 | End: 2022-06-14

## 2022-06-14 RX ORDER — SODIUM CHLORIDE 9 MG/ML
INJECTION, SOLUTION INTRAVENOUS CONTINUOUS
Status: CANCELLED | OUTPATIENT
Start: 2022-06-14

## 2022-06-14 RX ORDER — LIDOCAINE 40 MG/G
CREAM TOPICAL
Status: CANCELLED | OUTPATIENT
Start: 2022-06-14

## 2022-06-14 RX ORDER — ASPIRIN 81 MG/1
243 TABLET, CHEWABLE ORAL ONCE
Status: CANCELLED | OUTPATIENT
Start: 2022-06-14

## 2022-06-14 NOTE — PROGRESS NOTES
Date: 6/14/2022    Time of Call: 7:34 AM     Diagnosis:  Heart Failure     [ VORB ] Ordering provider: Dr. Stewart  Order: Right heart catheterization, PFT's 6 minute walk, Cardiopulmonary stress test.     Order received by: Woody Charles RN     Follow-up/additional notes: Orders placed.

## 2022-06-17 ENCOUNTER — HOSPITAL ENCOUNTER (INPATIENT)
Facility: CLINIC | Age: 62
LOS: 65 days | Discharge: NURSING, REHAB OR LONG-TERM CARE | DRG: 001 | End: 2022-08-21
Attending: INTERNAL MEDICINE | Admitting: INTERNAL MEDICINE
Payer: COMMERCIAL

## 2022-06-17 ENCOUNTER — TEAM CONFERENCE (OUTPATIENT)
Dept: INTERNAL MEDICINE | Facility: CLINIC | Age: 62
End: 2022-06-17

## 2022-06-17 ENCOUNTER — APPOINTMENT (OUTPATIENT)
Dept: GENERAL RADIOLOGY | Facility: CLINIC | Age: 62
DRG: 001 | End: 2022-06-17
Attending: INTERNAL MEDICINE
Payer: COMMERCIAL

## 2022-06-17 DIAGNOSIS — I25.5 ISCHEMIC CARDIOMYOPATHY: ICD-10-CM

## 2022-06-17 DIAGNOSIS — I50.20 HEART FAILURE WITH REDUCED EJECTION FRACTION, NYHA CLASS III (H): Primary | ICD-10-CM

## 2022-06-17 DIAGNOSIS — F41.9 ANXIETY: ICD-10-CM

## 2022-06-17 DIAGNOSIS — I25.10 CORONARY ARTERY DISEASE INVOLVING NATIVE CORONARY ARTERY OF NATIVE HEART WITHOUT ANGINA PECTORIS: ICD-10-CM

## 2022-06-17 DIAGNOSIS — I50.9 DECOMPENSATED HEART FAILURE (H): ICD-10-CM

## 2022-06-17 DIAGNOSIS — R57.0 CARDIOGENIC SHOCK (H): ICD-10-CM

## 2022-06-17 DIAGNOSIS — I51.89 OTHER ILL-DEFINED HEART DISEASES: ICD-10-CM

## 2022-06-17 DIAGNOSIS — Z95.811 LVAD (LEFT VENTRICULAR ASSIST DEVICE) PRESENT (H): ICD-10-CM

## 2022-06-17 LAB
ABO/RH(D): NORMAL
ALBUMIN SERPL-MCNC: 3.2 G/DL (ref 3.4–5)
ALP SERPL-CCNC: 126 U/L (ref 40–150)
ALT SERPL W P-5'-P-CCNC: 1856 U/L (ref 0–70)
ANION GAP SERPL CALCULATED.3IONS-SCNC: 14 MMOL/L (ref 3–14)
ANTIBODY SCREEN: NEGATIVE
AST SERPL W P-5'-P-CCNC: 1511 U/L (ref 0–45)
BASE EXCESS BLDV CALC-SCNC: -0.6 MMOL/L (ref -7.7–1.9)
BASE EXCESS BLDV CALC-SCNC: -2.1 MMOL/L (ref -7.7–1.9)
BASE EXCESS BLDV CALC-SCNC: 0.3 MMOL/L (ref -7.7–1.9)
BASOPHILS # BLD AUTO: 0 10E3/UL (ref 0–0.2)
BASOPHILS NFR BLD AUTO: 0 %
BILIRUB SERPL-MCNC: 1.2 MG/DL (ref 0.2–1.3)
BLD PROD TYP BPU: NORMAL
BLD PROD TYP BPU: NORMAL
BLOOD COMPONENT TYPE: NORMAL
BLOOD COMPONENT TYPE: NORMAL
BUN SERPL-MCNC: 20 MG/DL (ref 7–30)
CALCIUM SERPL-MCNC: 8.6 MG/DL (ref 8.5–10.1)
CHLORIDE BLD-SCNC: 100 MMOL/L (ref 94–109)
CO2 SERPL-SCNC: 19 MMOL/L (ref 20–32)
CODING SYSTEM: NORMAL
CODING SYSTEM: NORMAL
CREAT SERPL-MCNC: 1.23 MG/DL (ref 0.66–1.25)
EOSINOPHIL # BLD AUTO: 0.1 10E3/UL (ref 0–0.7)
EOSINOPHIL NFR BLD AUTO: 1 %
ERYTHROCYTE [DISTWIDTH] IN BLOOD BY AUTOMATED COUNT: 19.2 % (ref 10–15)
ERYTHROCYTE [DISTWIDTH] IN BLOOD BY AUTOMATED COUNT: 19.4 % (ref 10–15)
GFR SERPL CREATININE-BSD FRML MDRD: 67 ML/MIN/1.73M2
GLUCOSE BLD-MCNC: 112 MG/DL (ref 70–99)
GLUCOSE BLDC GLUCOMTR-MCNC: 117 MG/DL (ref 70–99)
GLUCOSE BLDC GLUCOMTR-MCNC: 120 MG/DL (ref 70–99)
GLUCOSE BLDC GLUCOMTR-MCNC: 95 MG/DL (ref 70–99)
HCO3 BLDV-SCNC: 22 MMOL/L (ref 21–28)
HCO3 BLDV-SCNC: 24 MMOL/L (ref 21–28)
HCO3 BLDV-SCNC: 25 MMOL/L (ref 21–28)
HCT VFR BLD AUTO: 27.4 % (ref 40–53)
HCT VFR BLD AUTO: 30 % (ref 40–53)
HGB BLD-MCNC: 8.5 G/DL (ref 13.3–17.7)
HGB BLD-MCNC: 9.2 G/DL (ref 13.3–17.7)
IMM GRANULOCYTES # BLD: 0 10E3/UL
IMM GRANULOCYTES NFR BLD: 1 %
INR PPP: 3.7 (ref 0.85–1.15)
ISSUE DATE AND TIME: NORMAL
ISSUE DATE AND TIME: NORMAL
LACTATE SERPL-SCNC: 1.1 MMOL/L (ref 0.7–2)
LACTATE SERPL-SCNC: 2.3 MMOL/L (ref 0.7–2)
LACTATE SERPL-SCNC: 4.3 MMOL/L (ref 0.7–2)
LYMPHOCYTES # BLD AUTO: 1.2 10E3/UL (ref 0.8–5.3)
LYMPHOCYTES NFR BLD AUTO: 17 %
MAGNESIUM SERPL-MCNC: 2.2 MG/DL (ref 1.6–2.3)
MCH RBC QN AUTO: 29 PG (ref 26.5–33)
MCH RBC QN AUTO: 29.1 PG (ref 26.5–33)
MCHC RBC AUTO-ENTMCNC: 30.7 G/DL (ref 31.5–36.5)
MCHC RBC AUTO-ENTMCNC: 31 G/DL (ref 31.5–36.5)
MCV RBC AUTO: 94 FL (ref 78–100)
MCV RBC AUTO: 95 FL (ref 78–100)
MONOCYTES # BLD AUTO: 0.7 10E3/UL (ref 0–1.3)
MONOCYTES NFR BLD AUTO: 10 %
NEUTROPHILS # BLD AUTO: 4.9 10E3/UL (ref 1.6–8.3)
NEUTROPHILS NFR BLD AUTO: 71 %
NRBC # BLD AUTO: 0 10E3/UL
NRBC BLD AUTO-RTO: 0 /100
NT-PROBNP SERPL-MCNC: 4611 PG/ML (ref 0–900)
O2/TOTAL GAS SETTING VFR VENT: 2 %
O2/TOTAL GAS SETTING VFR VENT: 21 %
O2/TOTAL GAS SETTING VFR VENT: 21 %
OXYHGB MFR BLDV: 35 % (ref 70–75)
OXYHGB MFR BLDV: 35 % (ref 70–75)
OXYHGB MFR BLDV: 46 % (ref 70–75)
PCO2 BLDV: 33 MM HG (ref 40–50)
PCO2 BLDV: 38 MM HG (ref 40–50)
PCO2 BLDV: 38 MM HG (ref 40–50)
PH BLDV: 7.41 [PH] (ref 7.32–7.43)
PH BLDV: 7.42 [PH] (ref 7.32–7.43)
PH BLDV: 7.42 [PH] (ref 7.32–7.43)
PLATELET # BLD AUTO: 207 10E3/UL (ref 150–450)
PLATELET # BLD AUTO: 252 10E3/UL (ref 150–450)
PO2 BLDV: 25 MM HG (ref 25–47)
PO2 BLDV: 25 MM HG (ref 25–47)
PO2 BLDV: 29 MM HG (ref 25–47)
POTASSIUM BLD-SCNC: 3.9 MMOL/L (ref 3.4–5.3)
PROT SERPL-MCNC: 6.5 G/DL (ref 6.8–8.8)
RBC # BLD AUTO: 2.93 10E6/UL (ref 4.4–5.9)
RBC # BLD AUTO: 3.16 10E6/UL (ref 4.4–5.9)
SODIUM SERPL-SCNC: 133 MMOL/L (ref 133–144)
SPECIMEN EXPIRATION DATE: NORMAL
UNIT ABO/RH: NORMAL
UNIT ABO/RH: NORMAL
UNIT NUMBER: NORMAL
UNIT NUMBER: NORMAL
UNIT STATUS: NORMAL
UNIT STATUS: NORMAL
UNIT TYPE ISBT: 5100
UNIT TYPE ISBT: 600
URATE SERPL-MCNC: 10.4 MG/DL (ref 3.5–7.2)
WBC # BLD AUTO: 5.7 10E3/UL (ref 4–11)
WBC # BLD AUTO: 6.9 10E3/UL (ref 4–11)

## 2022-06-17 PROCEDURE — 99152 MOD SED SAME PHYS/QHP 5/>YRS: CPT | Performed by: INTERNAL MEDICINE

## 2022-06-17 PROCEDURE — 86901 BLOOD TYPING SEROLOGIC RH(D): CPT | Performed by: INTERNAL MEDICINE

## 2022-06-17 PROCEDURE — 82805 BLOOD GASES W/O2 SATURATION: CPT | Performed by: INTERNAL MEDICINE

## 2022-06-17 PROCEDURE — 4A1239Z MONITORING OF CARDIAC OUTPUT, PERCUTANEOUS APPROACH: ICD-10-PCS | Performed by: INTERNAL MEDICINE

## 2022-06-17 PROCEDURE — 272N000001 HC OR GENERAL SUPPLY STERILE: Performed by: INTERNAL MEDICINE

## 2022-06-17 PROCEDURE — 93005 ELECTROCARDIOGRAM TRACING: CPT

## 2022-06-17 PROCEDURE — 200N000002 HC R&B ICU UMMC

## 2022-06-17 PROCEDURE — 33967 INSERT I-AORT PERCUT DEVICE: CPT | Performed by: INTERNAL MEDICINE

## 2022-06-17 PROCEDURE — 272N000057 HC CATH BALLOON IABP

## 2022-06-17 PROCEDURE — 99152 MOD SED SAME PHYS/QHP 5/>YRS: CPT | Mod: GC | Performed by: INTERNAL MEDICINE

## 2022-06-17 PROCEDURE — 93503 INSERT/PLACE HEART CATHETER: CPT | Performed by: INTERNAL MEDICINE

## 2022-06-17 PROCEDURE — 83605 ASSAY OF LACTIC ACID: CPT

## 2022-06-17 PROCEDURE — 93010 ELECTROCARDIOGRAM REPORT: CPT | Mod: 76 | Performed by: INTERNAL MEDICINE

## 2022-06-17 PROCEDURE — 99291 CRITICAL CARE FIRST HOUR: CPT | Mod: 25 | Performed by: INTERNAL MEDICINE

## 2022-06-17 PROCEDURE — 83735 ASSAY OF MAGNESIUM: CPT | Performed by: INTERNAL MEDICINE

## 2022-06-17 PROCEDURE — 02HP32Z INSERTION OF MONITORING DEVICE INTO PULMONARY TRUNK, PERCUTANEOUS APPROACH: ICD-10-PCS | Performed by: INTERNAL MEDICINE

## 2022-06-17 PROCEDURE — 80053 COMPREHEN METABOLIC PANEL: CPT | Performed by: INTERNAL MEDICINE

## 2022-06-17 PROCEDURE — 999N000077 HC STATISTIC INSERT IABP

## 2022-06-17 PROCEDURE — 99292 CRITICAL CARE ADDL 30 MIN: CPT | Performed by: INTERNAL MEDICINE

## 2022-06-17 PROCEDURE — 85025 COMPLETE CBC W/AUTO DIFF WBC: CPT | Performed by: INTERNAL MEDICINE

## 2022-06-17 PROCEDURE — 4A133B3 MONITORING OF ARTERIAL PRESSURE, PULMONARY, PERCUTANEOUS APPROACH: ICD-10-PCS | Performed by: INTERNAL MEDICINE

## 2022-06-17 PROCEDURE — 999N000185 HC STATISTIC TRANSPORT TIME EA 15 MIN

## 2022-06-17 PROCEDURE — 250N000013 HC RX MED GY IP 250 OP 250 PS 637

## 2022-06-17 PROCEDURE — 250N000009 HC RX 250: Performed by: INTERNAL MEDICINE

## 2022-06-17 PROCEDURE — 71045 X-RAY EXAM CHEST 1 VIEW: CPT | Mod: 26 | Performed by: STUDENT IN AN ORGANIZED HEALTH CARE EDUCATION/TRAINING PROGRAM

## 2022-06-17 PROCEDURE — 36592 COLLECT BLOOD FROM PICC: CPT | Performed by: STUDENT IN AN ORGANIZED HEALTH CARE EDUCATION/TRAINING PROGRAM

## 2022-06-17 PROCEDURE — 86850 RBC ANTIBODY SCREEN: CPT | Performed by: INTERNAL MEDICINE

## 2022-06-17 PROCEDURE — 36592 COLLECT BLOOD FROM PICC: CPT

## 2022-06-17 PROCEDURE — 85027 COMPLETE CBC AUTOMATED: CPT | Performed by: STUDENT IN AN ORGANIZED HEALTH CARE EDUCATION/TRAINING PROGRAM

## 2022-06-17 PROCEDURE — 999N000075 HC STATISTIC IABP MONITORING

## 2022-06-17 PROCEDURE — 250N000011 HC RX IP 250 OP 636: Performed by: INTERNAL MEDICINE

## 2022-06-17 PROCEDURE — 258N000003 HC RX IP 258 OP 636

## 2022-06-17 PROCEDURE — 250N000011 HC RX IP 250 OP 636: Performed by: STUDENT IN AN ORGANIZED HEALTH CARE EDUCATION/TRAINING PROGRAM

## 2022-06-17 PROCEDURE — 71045 X-RAY EXAM CHEST 1 VIEW: CPT

## 2022-06-17 PROCEDURE — 33967 INSERT I-AORT PERCUT DEVICE: CPT | Mod: GC | Performed by: INTERNAL MEDICINE

## 2022-06-17 PROCEDURE — 84550 ASSAY OF BLOOD/URIC ACID: CPT | Performed by: INTERNAL MEDICINE

## 2022-06-17 PROCEDURE — 82805 BLOOD GASES W/O2 SATURATION: CPT

## 2022-06-17 PROCEDURE — 250N000011 HC RX IP 250 OP 636

## 2022-06-17 PROCEDURE — 86923 COMPATIBILITY TEST ELECTRIC: CPT | Performed by: INTERNAL MEDICINE

## 2022-06-17 PROCEDURE — 83605 ASSAY OF LACTIC ACID: CPT | Performed by: INTERNAL MEDICINE

## 2022-06-17 PROCEDURE — 85610 PROTHROMBIN TIME: CPT | Performed by: INTERNAL MEDICINE

## 2022-06-17 PROCEDURE — 4A023N6 MEASUREMENT OF CARDIAC SAMPLING AND PRESSURE, RIGHT HEART, PERCUTANEOUS APPROACH: ICD-10-PCS | Performed by: INTERNAL MEDICINE

## 2022-06-17 PROCEDURE — P9059 PLASMA, FRZ BETWEEN 8-24HOUR: HCPCS

## 2022-06-17 PROCEDURE — 83880 ASSAY OF NATRIURETIC PEPTIDE: CPT | Performed by: INTERNAL MEDICINE

## 2022-06-17 RX ORDER — TAMSULOSIN HYDROCHLORIDE 0.4 MG/1
0.4 CAPSULE ORAL DAILY
Status: DISCONTINUED | OUTPATIENT
Start: 2022-06-18 | End: 2022-07-08

## 2022-06-17 RX ORDER — ASPIRIN 81 MG/1
81 TABLET, CHEWABLE ORAL DAILY
Status: DISCONTINUED | OUTPATIENT
Start: 2022-06-18 | End: 2022-07-08

## 2022-06-17 RX ORDER — DOBUTAMINE HYDROCHLORIDE 200 MG/100ML
2.5 INJECTION INTRAVENOUS CONTINUOUS
Status: DISCONTINUED | OUTPATIENT
Start: 2022-06-17 | End: 2022-06-22

## 2022-06-17 RX ORDER — LIDOCAINE 40 MG/G
CREAM TOPICAL
Status: DISCONTINUED | OUTPATIENT
Start: 2022-06-17 | End: 2022-06-17 | Stop reason: HOSPADM

## 2022-06-17 RX ORDER — SODIUM CHLORIDE 9 MG/ML
INJECTION, SOLUTION INTRAVENOUS CONTINUOUS
Status: DISCONTINUED | OUTPATIENT
Start: 2022-06-17 | End: 2022-06-17 | Stop reason: HOSPADM

## 2022-06-17 RX ORDER — ASPIRIN 325 MG
325 TABLET ORAL ONCE
Status: DISCONTINUED | OUTPATIENT
Start: 2022-06-17 | End: 2022-06-17 | Stop reason: HOSPADM

## 2022-06-17 RX ORDER — POTASSIUM CHLORIDE 750 MG/1
20 TABLET, EXTENDED RELEASE ORAL
Status: DISCONTINUED | OUTPATIENT
Start: 2022-06-17 | End: 2022-06-17 | Stop reason: HOSPADM

## 2022-06-17 RX ORDER — ASPIRIN 81 MG/1
243 TABLET, CHEWABLE ORAL ONCE
Status: DISCONTINUED | OUTPATIENT
Start: 2022-06-17 | End: 2022-06-17 | Stop reason: HOSPADM

## 2022-06-17 RX ORDER — FENTANYL CITRATE 50 UG/ML
INJECTION, SOLUTION INTRAMUSCULAR; INTRAVENOUS
Status: DISCONTINUED | OUTPATIENT
Start: 2022-06-17 | End: 2022-06-17 | Stop reason: HOSPADM

## 2022-06-17 RX ORDER — DULOXETIN HYDROCHLORIDE 30 MG/1
30 CAPSULE, DELAYED RELEASE ORAL DAILY
Status: DISCONTINUED | OUTPATIENT
Start: 2022-06-18 | End: 2022-07-08

## 2022-06-17 RX ORDER — HEPARIN SODIUM 10000 [USP'U]/100ML
0-5000 INJECTION, SOLUTION INTRAVENOUS CONTINUOUS
Status: DISPENSED | OUTPATIENT
Start: 2022-06-17 | End: 2022-07-08

## 2022-06-17 RX ORDER — ONDANSETRON 2 MG/ML
INJECTION INTRAMUSCULAR; INTRAVENOUS
Status: DISCONTINUED | OUTPATIENT
Start: 2022-06-17 | End: 2022-06-17 | Stop reason: HOSPADM

## 2022-06-17 RX ORDER — FUROSEMIDE 10 MG/ML
80 INJECTION INTRAMUSCULAR; INTRAVENOUS ONCE
Status: COMPLETED | OUTPATIENT
Start: 2022-06-17 | End: 2022-06-17

## 2022-06-17 RX ORDER — POTASSIUM CHLORIDE 750 MG/1
40 TABLET, EXTENDED RELEASE ORAL
Status: DISCONTINUED | OUTPATIENT
Start: 2022-06-17 | End: 2022-06-17 | Stop reason: HOSPADM

## 2022-06-17 RX ORDER — HYDROXYCHLOROQUINE SULFATE 200 MG/1
200 TABLET, FILM COATED ORAL 2 TIMES DAILY
Status: DISCONTINUED | OUTPATIENT
Start: 2022-06-17 | End: 2022-07-08

## 2022-06-17 RX ADMIN — ALTEPLASE 1 MG: 2.2 INJECTION, POWDER, LYOPHILIZED, FOR SOLUTION INTRAVENOUS at 23:13

## 2022-06-17 RX ADMIN — PHYTONADIONE 5 MG: 10 INJECTION, EMULSION INTRAMUSCULAR; INTRAVENOUS; SUBCUTANEOUS at 18:42

## 2022-06-17 RX ADMIN — DOBUTAMINE HYDROCHLORIDE 7.5 MCG/KG/MIN: 200 INJECTION INTRAVENOUS at 18:34

## 2022-06-17 RX ADMIN — HYDROXYCHLOROQUINE SULFATE 200 MG: 200 TABLET, FILM COATED ORAL at 19:43

## 2022-06-17 RX ADMIN — HEPARIN SODIUM 800 UNITS/HR: 10000 INJECTION, SOLUTION INTRAVENOUS at 22:41

## 2022-06-17 RX ADMIN — FUROSEMIDE 80 MG: 10 INJECTION, SOLUTION INTRAVENOUS at 18:38

## 2022-06-17 ASSESSMENT — ACTIVITIES OF DAILY LIVING (ADL)
WEAR_GLASSES_OR_BLIND: YES
DIFFICULTY_EATING/SWALLOWING: YES
TRANSFERRING: 0-->INDEPENDENT
WALKING_OR_CLIMBING_STAIRS_DIFFICULTY: YES
FALL_HISTORY_WITHIN_LAST_SIX_MONTHS: YES
TRANSFERRING: 0-->INDEPENDENT
BATHING: 0-->INDEPENDENT
ADLS_ACUITY_SCORE: 26
SWALLOWING: 2-->DIFFICULTY SWALLOWING FOODS
VISION_MANAGEMENT: READING GLASSES
DRESS: 0-->INDEPENDENT
CONCENTRATING,_REMEMBERING_OR_MAKING_DECISIONS_DIFFICULTY: NO
ADLS_ACUITY_SCORE: 26
ADLS_ACUITY_SCORE: 26
EATING/SWALLOWING: OTHER (SEE COMMENTS)
TOILETING_ISSUES: NO
SWALLOWING: 2-->DIFFICULTY SWALLOWING FOODS
EATING: 0-->INDEPENDENT
DOING_ERRANDS_INDEPENDENTLY_DIFFICULTY: NO
EATING: 0-->INDEPENDENT
DRESSING/BATHING: DRESSING DIFFICULTY, ASSISTANCE 1 PERSON
DRESSING/BATHING_DIFFICULTY: NO
DRESS: 0-->INDEPENDENT
NUMBER_OF_TIMES_PATIENT_HAS_FALLEN_WITHIN_LAST_SIX_MONTHS: 2
CHANGE_IN_FUNCTIONAL_STATUS_SINCE_ONSET_OF_CURRENT_ILLNESS/INJURY: YES

## 2022-06-17 NOTE — Clinical Note
Report given to Alisa LEUNG. All gtts remain the same and pt on 2Lpm O2. All sheaths added to flowsheet IV page.

## 2022-06-17 NOTE — Clinical Note
dry, intact, no bleeding and no hematoma. 9Fr IABP in RFA. Sutured by interventional fellow. Stat locks in place.   7Fr SWAN in RIJ leave in sutured and at 40 locked.   Dressings over both sites.

## 2022-06-17 NOTE — LETTER
Transition Communication Hand-off for Care Transitions to Next Level of Care Provider    Name: Dandy Sands  : 1960  MRN #: [id number]  Primary Care Provider: Scar Jeffrey     Primary Clinic: [facility] [address] [facility] [place] [address]     Reason for Hospitalization:  Cardiogenic shock (H) [R57.0]  Admit Date/Time: 2022  5:35 PM  Discharge Date: -2022  Payor Source: Payor: [facility] NETWORK / Plan: [employer] TRANSPLANT / Product Type: Indemnity /       Plan of Care Goals/Milestone Events:   Patient Concerns: Wants to get back to [place]   Patient Goals:   Short-term-Discharge to inpatient rehab for strengthening so he can go to local apartment with family providing 24-hour caregiver support for 30 days   Long-term-Get back to [place]     Reason for Communication Hand-off Referral: Other Communicate Discharge Plan    Discharge Plan:  Going to [facility] [phone]       Concern for non-adherence with plan of care:   Y/N No  Discharge Needs Assessment:  Needs    Flowsheet Row Most Recent Value   Equipment Currently Used at Home none          Follow-up plan:    Future Appointments   Date Time Provider Department Center   2022  2:00 PM Lashonda Ca, RAYR [facility] O   2022  2:30 PM Terry Pelayo, PT [facility] O   2022  9:15 AM [facility] LAB [facility] [place] [facility]   2022  9:30 AM UUECHR2 [facility] O   2022 10:30 AM UUEKGM [facility] O   2022 12:30 PM [facility]   2022  1:00 PM UC CV DEVICE 1 UCCVCV [facility]   2022  1:30 PM Darius Zamora MD [facility]   2022  7:45 AM UC LAB UCLABR [facility]   2022  8:00 AM Kelly Lora MD [facility]   10/7/2022  1:30 PM UC LAB UCLABR [facility]   10/7/2022  2:00 PM UC PFL 6 MINUTE WALK 1 UCPFT [facility]   10/7/2022  2:30 PM  CV DEVICE 1 UCCVCV [facility]   10/7/2022  3:00 PM Kelly Lora MD [facility]    1/12/2023 12:00 PM UC LAB UCLABR [facility]   1/12/2023 12:30 PM UC PFL 6 MINUTE WALK 1 UCPFT [facility]   1/12/2023  1:00 PM UC CV DEVICE 1 UCCVCV [facility]   1/12/2023  1:30 PM UCECHCR1 UCCVCV [facility]   1/12/2023  2:30 PM Kelly Lora MD [facility]       Any outstanding tests or procedures:        Referrals     Future Labs/Procedures    CARDIAC REHAB REFERRAL     Process Instructions:    Advance to Wellness and Exercise for Life (WEL) Program or to another maintenance exercise program upon completion of phase 2 cardiac rehab.    Weight mgt program is only offered at [facility].    *This therapy referral will be filtered to a centralized scheduling office at [facility] and the patient will receive a call to schedule an appointment at a [place] location most convenient for them. *        Comments:    Please be aware that coverage of these services is subject to the terms and limitations of your health insurance plan.  Call member services at your health plan with any benefit or coverage questions.     Order is sent electronically to central rehab scheduling. Call [phone] if you haven't been contacted regarding these appointments within 2 business days of discharge.  If you have not heard from the scheduling office within 2 business days, please call [phone] for [facility], [phone] for [place] and [phone] for [place].            Key Recommendations:      JAMARCUS Richardson    AVS/Discharge Summary is the source of truth; this is a helpful guide for improved communication of patient story

## 2022-06-17 NOTE — Clinical Note
Potential access sites were evaluated for patency using ultrasound.   The right femoral artery was selected. Access was obtained under Sonosite and Fluoroscopic guidance using a standard 18 gauge needle with direct visualization of needle entry.

## 2022-06-17 NOTE — Clinical Note
Secured with Catheter remains in place at 40cm.    Secured in normal fashion with sutureTegedermby Interventional Fellow.

## 2022-06-17 NOTE — LETTER
Faxed to:  [facility]  Fax Number:  [phone]       [address], [address]  [place] [address]  [phone] Office  [phone] Fax Hospital Name     [facility]        Prescription Information      Patient Information   Dandy Sands                                                                         DT    See facesheet   Address                                          City, [facility]                                ZIP Code  See facesheet                                                    See facesheet   Patient Phone  Other/Cell Phone         Patient Allergies         Authorized Prescriber Information  [facility] Coordinator   Kelly Brasher, Macy Greer, Daphne Villar, Anthony Fonseca, Meredith Long, Verna Cho, Alia Littlejohn   Name    [address] [place] [address]  Name    [address] [place] [address]   [place] [address]  [address]      [place] [phone]                 [phone]  [place] [phone]            [phone]            Phone                                Fax                                               [phone]  Phone                                           Fax                                                                                              [email or web address]  [email or web address]     License #                         NPI#  Email                                                                                  Diagnosis Code  Items Prescribed - Per Month Product # Quantity (Please Select Size/Quantities)   Z95.811      Z48.01  I42.8        Z48.812  Gloves - Non-Sterile  Any Size 1 box    X Gloves -  Sterile Any Size 1 box   Implant Date  Sterile Gauze Sponges 2x2 and/ or 4x4 Box Any Size 6 boxes     7/12/22    Drain Sponges 2x2 and/ or 4x4 Box Any Size 6 boxes     Masks Any 2 boxes   Discharge Date  Betadine Swabs Any 3 swabs/day (30)   Pt has not discharged X Medipore Tape 2  and/ or 4  Rolls  10 rolls     X Barrier Film 3.0ML Each 3345 30 each   Length of need X Centurion anchor device [id number] 6 each    X  [place] daily Each [id number] 30 each      [employer] [place] weekly Kit   30 each   12 months X Adhesive Remover  Any  1 box     Aquaguard Any 3 packages (1 package contains 7)    X Doppler/Cuff/Gel   1                            As the referring provider, I certify that I am initiating the above prescribed order for LVAD accessories and/or stabilization supplies as a medically necessary part of my overall treatment plan for my patient who is identified by name on this form.  In my opinion, these products are reasonable and necessary in reference to the accepted standards of medical practice in the treatment of this patient's condition and are not prescribed as convenience items.  I also certify that I have discussed potential treatment options with my patient.  I have advised my patient that he/she has a right to choose the durable medical equipment (DME) provider that provides LVAD accessories and/or stabilization supplies pursuant to this order.  My patient has consented to be contacted by [facility] [employer] [facility].     PrescriDate __07/12/22________                                      Signature must be hand written. No stamps allowed - Medicare & Medicaid permit prescriber signatures.    Printed Prescriber Name____Kelly Lora_______________NPI # [id number]

## 2022-06-17 NOTE — H&P
Cardiology History and Physical  Dandy Sands MRN: [id number]  Age: 61 year old, : 1960  Primary care provider: Scar Jeffrey            Assessment and Plan:     In summary, 61-year-old man with history of ischemic cardiomyopathy LVEF 15%) NYHA class IV symptoms ACC stage D presenting after multiple admissions for heart failure over the past several months for evaluation of advanced therapies.    A) Cardiovascular    Ambulatory cardiogenic shock  Acute on chronic systolic heart failure  Advanced ischemic cardiomyopathy, Class IV, Stage D     Last TTE on 15: EF %; Normal RV function; LVIDd 6.8 cm.         Plan:  -- Inotropes: Dobutamine 5 mcg --> 7.5  -- MCS: likely will need iABP  -- Fluid status: Hypervolemic. Diuretic: 80mg IV lasix x1, redose as needed  -- Afterload reduction: Continue iABP; likely iso/hydral  -- BB: cardiogenic shock  -- Aldosterone antagonist: cardiogenic shock  -- Anticoagulation: hep gtt once iABP in place  -- Antiplatelet: Continue ASA-81mg, hold plavix  -- Statin: hold statin   -- Device: ICD   -- Hemodynamics QID along with Lactic acid  -- BMP BID, K>4 & Mg>2  -- Strict I/Os  -- Daily weight  -- Low salt diet <2 g/24 hours      B) Neuro  Intermittent somnolence        C) Pulm  No data recorded  No lab results found in last 7 days.        D) GI  Shock liver  - Diet: NPO  - Bowel regimen: NPO  - LFTs: shock liver  - GI prophylaxis: PPI     E) Renal  Creatinine 1.09    No intake or output data in the 24 hours ending 22 1639      F) Heme  DVT Prophylaxis: Hep gtt      G) ID  NA    H) Endo  NA    Fitzgerald Catheter: in place, indication: Strict 1-2 Hour I&O, Strict 1-2 Hour I&O  Code Status:     Lines/tubes: planned for SGC and iABP; right PICC in place       Disposition Plan   Expected discharge: once advanced heart failure work-up completed.       Patient discussed with staff attending, Dr. Lora.    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  He has previously undergone evaluation of multivessel coronary artery disease and moderate-severe functional mitral regurgitation.  However, cardiac MRI viability study showed nonviable myocardium in the LAD territory.  Subsequently underwent revascularization with PCI.  Subsequently has been evaluated for LVAD/transplant.  Much of work-up has been performed previously.    Discharged from [facility] on 6/2.  Follow-up with Dr. Stewart 6/8/2022. Notes that since his discharge he has been doing relatively well.  He has been gaining some weight which he thinks is probably fluid weight because he got more short of breath does not have PND orthopnea a.  No episodes of chest pain or other complaints at this time.  Appetite also noted to be improved.    He presents now as a transfer from [place] where he presented with signs and symptoms concerning for cardiogenic shock.  Per chart review, he presented on 6/13/2022 with nausea vomiting and inability to eat without vomiting.  Symptoms were not relieved by his Phenergan.  Also noted that his neck felt swollen and hurt when he lays down.  Additionally, noted abdominal bloating.  No fevers or chills.     Evaluation at the hospital notable for AST 1857, ALT > 1200, Creatinine 1.09, INR 3.43. Lactic acid 2.1 --> 1.3. VBG notable for SVO2 of 26%. Diuresed 1L during hospitalization. Right triple lumen PICC. Placed on Dobutamine 5 mcg.       LVAD/Transplant Evaluation Checklist  [x]????? labs (CBC, CMP, PT/INR, cystatin C, prealbumin,  UA + micro)  [x]????? Infectious (Hep A/B/C, HIV, treponema, HSV 1/2 IgG, CMV IgG, Toxo IgG, EBV IgG, varicella IgG, Quant gold, COVID vaccine/PCR)  [x]????? Utox/nicotine and cotinine/PeTH   []????? Immunocompatibility (last transfusion, ABO, HLA tissue typing, PRA): in process  [x]????? ECG, Echo  []????? CPX - can be oupatient  []????? 6MWT - can be outpatient  [x]????? RHC, completed but needs repeat in 2-3 months to assess PVR  [x]????? CVTS consult  [x]????? Social work consult  [x]?????  Palliative care consult: final note pending  [x]????? Neuropsych consult: order placed: final note pending  [x]????? Nutrition consult  [x]????? CT Dental   [x]????? Dental consult  [x]????? Abd US + doppler  - Abd US complete done, not with doppler; OK given unremarkable CT CAP  [x]????? Extremity US and ABIs  [x]????? Carotid US (if DM or ICM or >49yo)  []????? PFTs: outpatient  []????? Dexa: outpatient  [x]????? CT head non-contrast  [x]????? CT CAP non-contrast  [x]????? Colonoscopy (>51 yo)  [x]????? PSA/HCG     A 13-point ROS is negative except as mentioned above          Past Medical History:     No past medical history on file.           Past Surgical History:      Past Surgical History:   Procedure Laterality Date     COLONOSCOPY N/A 05/23/2022    Procedure: COLONOSCOPY;  Surgeon: Parth Meneses MD;  Location: [facility] OR     CV CORONARY ANGIOGRAM N/A 5/24/2022    Procedure: Coronary Angiogram;  Surgeon: Mike Pope MD;  Location: [facility] HEART CARDIAC CATH LAB     CV CORONARY ANGIOGRAM N/A 5/29/2022    Procedure: Coronary Angiogram;  Surgeon: Austin Bright MD;  Location:  HEART CARDIAC CATH LAB     CV CORONARY LITHOTRIPSY PCI Bilateral 5/24/2022    Procedure: Percutaneous Coronary Intervention - Lithotripsy;  Surgeon: Mike Pope MD;  [facility]: [facility] CARDIAC CATH LAB     CV INTRAVASULAR ULTRASOUND N/A 5/24/2022    Procedure: Intravascular Ultrasound;  Surgeon: Mike Pope MD;  [facility]     CV PCI ANGIOPLASTY N/A 5/29/2022    Procedure: Percutaneous Transluminal Angioplasty;  Surgeon: Austin Bright MD;  Location:  HEART CARDIAC CATH LAB     CV PCI STENT DRUG ELUTING N/A 5/24/2022    Procedure: Percutaneous Coronary Intervention Stent;  Surgeon: Mike Pope MD;  Location:  HEART CARDIAC CATH LAB     CV RIGHT HEART CATH MEASUREMENTS RECORDED N/A 05/18/2022    Procedure: Right Heart Catheterization;  Surgeon: Justo Stewart MD;  Location:  HEART CARDIAC CATH LAB     CV RIGHT HEART CATH MEASUREMENTS RECORDED N/A 5/24/2022    Procedure: Right Heart Catheterization;  Surgeon: Mike Pope MD;  Location:  HEART CARDIAC CATH LAB     CV RIGHT HEART CATH MEASUREMENTS RECORDED N/A 5/29/2022    Procedure: Right Heart Catheterization;  Surgeon: Austin Bright MD;  Location:  HEART CARDIAC CATH LAB     CV RIGHT HEART EXERCISE STRESS STUDY N/A 05/18/2022    Procedure: Stress Drug Study;  Surgeon: Justo Stewart MD;  Location: [facility] CARDIAC CATH LAB     PICC DOUBLE LUMEN PLACEMENT Right 05/28/2022    right basilic 5 fr dl picc 40 cm              Social History:     Social History     Socioeconomic History     Marital status: Single     Spouse name: Not on file     Number of children: Not on file     Years of education: Not on file     Highest education level: Not on file   Occupational History     Not on file   Tobacco Use     Smoking status: Not on file     Smokeless tobacco: Not on file   Substance and Sexual Activity     Alcohol use: Not on file     Drug use: Not on file     Sexual activity: Not on file   Other Topics Concern     Not on file   Social History Narrative     Not on file     Social Determinants of Health     Financial Resource Strain: Not on file   Food Insecurity: Not on file   Transportation Needs: Not on file   Physical Activity: Not on file   Stress: Not on file   Social Connections: Not on file   Intimate Partner Violence: Not on file    Housing Stability: Not on file              Family History:     No family history on file.  Family history reviewed and updated in EPIC          Allergies:     No Known Allergies           Medications:     No medications prior to admission.                    Physical Exam:     B/P: Data Unavailable, T: Data Unavailable, P: Data Unavailable, R: Data Unavailable    Wt Readings from Last 4 Encounters:   05/29/22 65 kg (143 lb 4.8 oz)   05/26/22 64.6 kg (142 lb 8 oz)       No intake or output data in the 24 hours ending 06/17/22 1639    Gen: No acute distress  HEENT: PERRL, EOM intact, OP without exudates  PULM/THORAX: Rales at bases bilaterally  CV: RRR, normal S1 and S2, no murmurs or rubs. No JVD  ABD: distended; +fluid wave shift  EXT:  No LE edema. Palpable pulses  NEURO: CN II-XII grossly intact. A&Ox3  PSYCH: normal affect and mentation  SKIN: no rashes or lesions    Lines            Data:     Labs Reviewed on Admission  Pertinent for:  Troponin No results found for: TROPI, TROPONIN, TROPR, TROPN  BMP  Recent Labs   Lab 06/17/22  1751 06/17/22  1746   NA  --  133   POTASSIUM  --  3.9   CHLORIDE  --  100   *  --      CBC  Recent Labs   Lab 06/17/22  1746   WBC 6.9   RBC 3.16*   HGB 9.2*   HCT 30.0*   MCV 95   MCH 29.1   MCHC 30.7*   RDW 19.2*        INR  Recent Labs   Lab 06/17/22  1746   INR 3.70*      Hepatic Panel   Lab Results   Component Value Date    AST 14 05/31/2022     Lab Results   Component Value Date    ALT 26 05/31/2022     No results found for: BILICONJ   Lab Results   Component Value Date    BILITOTAL 0.5 05/31/2022     Lab Results   Component Value Date    ALBUMIN 3.0 05/31/2022     Lab Results   Component Value Date    PROTTOTAL 6.6 05/31/2022      Lab Results   Component Value Date    ALKPHOS 67 05/31/2022              BNP 1,141 (H)          Most Recent Imaging:     Stress Test:      Echo:   06/14/2022 3:44 PM CDT       Left Ventricle: The left ventricle is dilated. Severely  reduced left   ventricular systolic function. The EF is visually estimated to be 10-15%.     Right Ventricle: The right ventricle is dilated. Systolic function is   reduced. The RVSP measures 46 mmHg. There is mild-to-moderate pulmonary   hypertension.     Left Atrium: The left atrium is dilated.     Mitral Valve: The mitral valve has normal structure and function. There   is severe regurgitation.     Tricuspid Valve: Tricuspid valve structure is normal. There is   moderate-to-severe regurgitation.     Aortic Valve: The aortic valve is tricuspid.     Aorta: The sinus of Valsalva is normal. The ascending aorta is normal.     Pericardium: There is no pericardial effusion.    Cath:                 CT Abdomen/Pelvis 6/13/22:     IMPRESSION:  1. Moderate volume of free fluid within the pelvis. No well-defined source appreciated. Small volume of perihepatic and perisplenic fluid noted.  2. Prominent left para-aortic ben tissue. Nonspecific. Could be reactive in nature although lymphoproliferative disorder is difficult to exclude. Nonspecific stranding in the central mesentery is also noted.  3. Diffuse thickened appearance of the gallbladder with question small volume of pericholecystic fluid. Findings are nonspecific in the setting of ascites. If further characterization is desired correlation with direct quadrant ultrasound or HIDA scan could be considered.  4. Appendix is poorly characterized on this exam. Densities in the right lower quadrant raises possibility of prior appendectomy.  5. Presumed sequela of bilateral avascular necrosis.  6. Prominence of the liver measuring up to 18.2 cm in length. No definite focal hepatic lesion.  7. Small bilateral pleural effusions, greater on the right.  8. Suggestion of calcifications outlining the left ventricular apical region.  9. Additional chronic/incidental findings as described..      Device Check 5/18/22:  Device: Medtronic [id number] Evermasood TIRADO DR  Normal Device Function.    Intrinsic Rhythm: SR @ 91 bpm  AP= <0.1%  = <0.1%  Arrhythmias/Episodes: 5 VT-NS episodes recorded, lasting 1-2 seconds, at 186-218 bpm. Most recent VT-NS episode recorded on 4/10/22. 3 SVT-ST episodes recorded, lasting 16-22 seconds, at 154-167 seconds. Most recent SVT episode recorded on 2/19/22.  Lead Trends Appear Stable: Yes  Short V-V Intervals Recorded: 0  Pacing Programmed From AAI<=>DDD  bpm to ODO.  ICD Tachy Therapies Programmed OFF  Estimated Battery Longevity to RRT= 6 years. Battery voltage = 2.98 V.  Pt to be monitored by Device RN during MRI scan.

## 2022-06-17 NOTE — PLAN OF CARE
Admitted/transferred from: [facility]  Reason for admission/transfer: Possible LVAD vs heart transplant workup  2 RN skin assessment: completed by Belia SCHAEFER, and Kenroy MENA RN  Result of skin assessment and interventions/actions: preventative mepilex placed on coccyx, skin WNL.   Height, weight, drug calc weight: Done  Patient belongings (see Flowsheet)      Major Shift Events:  Admitted from [place]. On RA, vital signs WNL. Pt slightly drowsy but oriented x4, received 1.5mg ativan en route (per daughter, ativan makes pt confused). Lactic 4.3, 80mg lasix given. 2 FFP ordered for elevated INR. Consented for right heart catheterization and IABP. Dobutamine gtt @ 7.5.  Plan: Right heart cath and IABP placement.  For vital signs and complete assessments, please see documentation flowsheets.     ?

## 2022-06-17 NOTE — Clinical Note
IABP inserted in the right femoral artery. IABP inserted with 40 cc balloon volume Lot number is: [id number]

## 2022-06-17 NOTE — LETTER
Faxed to:  [facility]  Fax Number:  [phone]                                                                                                                                                                                                   [email or web address]   Email Order Form to [email or web address]  Phone: [phone] Fax: [phone]      Patient Name: Dandy Sands Date: 07/11/22   Facility Name: [facility]  Fax Number: [phone]    VAD Coordinator/ :   Chantal Brasher RN Phone: [phone]    Email Address:         [email or web address]   Patient's Primary Insurance Upon Receiving the VAD unit:          Dressing Change Frequency: Daily X Every Other Day   Other (Specify)              Duration of Need:  DT (Lifetime) X BTT (until transplant)   Other (Specify)      NEW ORDERS ONLY: PLEASE SUBMIT PATIENT DEMOGRAPHICS, POST OP NOTES & NOTES FROM MOST RECENT CLINICAL VISIT WITH ORDER FORM.                  Fax: [phone]  Or Email Order Form to   [email or web address]     ck Product Size/ Description Quantity    Kit Options:     X Centurion Driveline Management Tray      Daily Up to 30    Centurion Driveline Management Tray      Weekly Up to 5   X Centurion Orondo [place] [id number] Up to 30    Centurion Fitzgerald [place] [id number] Up to 30    Aquaguard/Shower Shield 7 x 7       9  x 9      10  x 12  Up to 30    Blenderm Surgical Tape 2 inch     Sensitive Skin:     X Uni-Solve Adhesive Remover Wipes  1 box    FreeDerm Adhesive Remover 1 oz. Spray Bottle Up to 8    Betadine Swabs  3/pack Up to 30    Hibiclens 16 oz. Bottle Up to 3    MicroKlenz Wound Cleanser Bottle Up to 3   X Medipore H Tape 2  Roll Up to 5    Micropore Paper Tape 2  Roll Up to 5    CE 3M Blue Silicone Tape  Up to 5    MC Johan Secure  [place]  Up to 8    CathGrip [place] Med/2 Strap    Large/2 Strap Up to 10    Abdominal Binder Sm     Med     Lg       Up to 2    Simpurity Transparent Film 4 x 5 Up to 30    Individual Supplies:      Earloop Surgical Mask 100/box Up to 3    Alcohol Wipe  1 Box    Non Sterile Gloves  100/box        Size: S    M    L   XL Up to 3    Chlorascrub Swabsticks   Each     1  Up to 30   X Sterile Vinyl PF gloves Sizes   6   7   8   9 Up to 30    Sterile Gauze Sponge 4  x 4   (2/pkg) Up to 30    Sterile Drain Sponge 4  x 4   (2/pkg) Up to 30    Biopatch 1  Up to 30    3M No-sting Barrier Wipes 50/box Up to 3    Sorbaview Shield  Up to 30     My signature below certifies the medical necessity of the above approved products and I certify the patient has been trained in the use of all products. The patient is aware that [facility] will contact him/her regarding the shipment of ordered dressing supplies.     Provider Name: Kelly Lora NPI#: [id number]   Provider Signature:         Provider Number: [id number]     [facility] will confirm receipt for all initial orders by sending a fax to the provider listed above.

## 2022-06-17 NOTE — PHARMACY-ADMISSION MEDICATION HISTORY
Admission Medication History Completed by Pharmacy    See [facility] Admission Navigator for allergy information, preferred outpatient pharmacy, prior to admission medications and immunization status.     Medication History Sources:     MAR from [facility]- admitted 6/13/2022    Surescripts    Changes made to PTA medication list (reason):    Added: None    Deleted: treatment enoxaparin order- was used as bridging until INR therapeutic for warfarin (INR is at goal now)    Changed: updated warfarin regimen to most current as of 6/15/2022: 7.5 mg q Mon/Thu/Sat, 5 mg ROW.  Patient received @ OSH the following regimen: 5 mg po x 1 on 6/14, 6/15, and 6/16    Additional Information:    Patient was receiving lasix 40 mv iv q6h @ OSH (home regimen is 40 mg po bid) and Mycophenolate 1500 mg po bid (NEW)       Prior to Admission medications    Medication Sig Last Dose Taking? Auth Provider Long Term End Date   atorvastatin (LIPITOR) 40 MG tablet Take 40 mg by mouth daily Past Week at Unknown time Yes Unknown, Entered By History     clopidogrel (PLAVIX) 75 MG tablet Take 1 tablet (75 mg) by mouth daily 6/17/2022 at Unknown time Yes Duncan Luciano MD Yes    DULoxetine (CYMBALTA) 30 MG capsule Take 1 capsule (30 mg) by mouth daily 6/17/2022 at Unknown time Yes Laila España PA-C Yes    famotidine (PEPCID) 20 MG tablet Take 20 mg by mouth 2 times daily 6/17/2022 at Unknown time Yes Unknown, Entered By History     ferrous sulfate (FE TABS) 325 (65 Fe) MG EC tablet Take 325 mg by mouth daily (with lunch) Past Week at Unknown time Yes Unknown, Entered By History     furosemide (LASIX) 40 MG tablet Take 1 tablet (40 mg) by mouth daily Past Week at Unknown time Yes Duncan Luciano MD Yes    hydroxychloroquine (PLAQUENIL) 200 MG tablet Take 200 mg by mouth 2 times daily 6/17/2022 at 0800 Yes Unknown, Entered By History     hydrOXYzine (ATARAX) 25 MG tablet Take 1 tablet (25 mg) by mouth every 6 hours as needed for  anxiety (ANXIETY) 6/17/2022 at 1245 Yes Duncan Luciano MD     LORazepam (ATIVAN) 0.5 MG tablet Take 0.5-1 mg by mouth every 4 hours as needed for anxiety Past Week at Unknown time Yes Unknown, Entered By History     melatonin 3 MG tablet Take 6 mg by mouth nightly as needed for sleep 6/16/2022 at 2100 Yes Unknown, Entered By History     mycophenolate (GENERIC EQUIVALENT) 500 MG tablet Take 1,500 mg by mouth 2 times daily 6/17/2022 at 0800 Yes Unknown, Entered By History     potassium chloride ER (KLOR-CON M) 20 MEQ CR tablet Take 1 tablet (20 mEq) by mouth 2 times daily Past Week at Unknown time Yes Laila España PA-C     promethazine (PHENERGAN) 12.5 MG tablet Take 1 tablet (12.5 mg) by mouth every 6 hours as needed for nausea or vomiting 6/16/2022 at 1200 Yes Duncan Luciano MD     tamsulosin (FLOMAX) 0.4 MG capsule Take 0.4 mg by mouth daily 6/17/2022 at Unknown time Yes Unknown, Entered By History     thiamine (B-1) 100 MG tablet Take 1 tablet (100 mg) by mouth daily 6/17/2022 at Unknown time Yes Laila España PA-C     warfarin ANTICOAGULANT (COUMADIN) 5 MG tablet Take 1 tablet (5 mg) by mouth daily Get an INR on Friday 5/27 and then follow instructions by your coumadin clinic 6/16/2022 at 1800 Yes Duncan Luciano MD     zolpidem (AMBIEN) 5 MG tablet Take 5 mg by mouth nightly as needed for sleep Unknown at Unknown time Yes Unknown, Entered By History     lisinopril (ZESTRIL) 2.5 MG tablet Take 1 tablet (2.5 mg) by mouth 2 times daily   Laila España PA-C Yes 5/30/22   ticagrelor (BRILINTA) 90 MG tablet Take 1 tablet (90 mg) by mouth 2 times daily   Laila España PA-C Yes 5/30/22       Date completed: 06/17/22    Medication history completed by: Karin Lopez, PharmD

## 2022-06-17 NOTE — LETTER
Dandy Sands  [address]  [place] [address]              2022  Dandy Sands  : 1960  ICD10:  I50    Attn:  Dr. Jeffrey  Fax:  [phone]    Subject: Vaccination Update Request    Dandy Sands is a potential heart transplant recipient currently being followed by the [facility].  We would like to request your assistance in obtaining the following tests and/or procedures in your local community to assist in the care of our mutual patient:    COVID vaccine - required for transplant    Inactivated Influenza Vaccine (IIV)-yearly    Tetanus, diphtheria, Pertussis (Dtap) if none in 10 years and Tdap booster every 5-10 years    Hepatitis B vaccine series (if HBsAb negative, HBcAB and ABsAg negative)-see results below    Pneumococcal vaccine:  -if naive to any pneumonia vaccine:  PCV-13, PPSV-23 vaccine > or = 8 weeks later, subsequent PPSV-23 vaccine > or = 5 years from the prior PPSV-23    -if with prior PPSV-23 x1 in the past:  PCV-13 > or = 1 year from prior PPSV-23  PPSV -23 > or = 5 years from the prior PPSV-23 and > or = 8 weeks from the prior PCV-13    -if with prior PPSV-23 x2 in the past:  PCV-13 > or = 1 year from the last PPSV-23    Shinrix    Please fax results as soon as they are available to:   Thoracic Transplant Department  Fax Number:  [phone]    Thank you  for your continued support and the opportunity to collaborate in the care of this patient.  If you have any questions, please call Jessica Duff or Lo Ochoa, Transplant Coordinators,  at [phone] (option 2) or [phone].    .  Kelly Lora MD   of Medicine  [facility] Division of Cardiology      Jessica Duff RN, BSN  Heart Transplant Coordinator  [facility]    CC:

## 2022-06-18 ENCOUNTER — APPOINTMENT (OUTPATIENT)
Dept: GENERAL RADIOLOGY | Facility: CLINIC | Age: 62
DRG: 001 | End: 2022-06-18
Attending: INTERNAL MEDICINE
Payer: COMMERCIAL

## 2022-06-18 LAB
ALBUMIN SERPL-MCNC: 3 G/DL (ref 3.4–5)
ALBUMIN SERPL-MCNC: 3 G/DL (ref 3.4–5)
ALP SERPL-CCNC: 108 U/L (ref 40–150)
ALP SERPL-CCNC: 110 U/L (ref 40–150)
ALT SERPL W P-5'-P-CCNC: 1628 U/L (ref 0–70)
ALT SERPL W P-5'-P-CCNC: 1725 U/L (ref 0–70)
ANION GAP SERPL CALCULATED.3IONS-SCNC: 11 MMOL/L (ref 3–14)
ANION GAP SERPL CALCULATED.3IONS-SCNC: 6 MMOL/L (ref 3–14)
AST SERPL W P-5'-P-CCNC: 1282 U/L (ref 0–45)
AST SERPL W P-5'-P-CCNC: 888 U/L (ref 0–45)
BASE EXCESS BLDV CALC-SCNC: -1.7 MMOL/L (ref -7.7–1.9)
BASE EXCESS BLDV CALC-SCNC: 0.6 MMOL/L (ref -7.7–1.9)
BASE EXCESS BLDV CALC-SCNC: 1.6 MMOL/L (ref -7.7–1.9)
BASE EXCESS BLDV CALC-SCNC: 2.6 MMOL/L (ref -7.7–1.9)
BASE EXCESS BLDV CALC-SCNC: 2.6 MMOL/L (ref -7.7–1.9)
BILIRUB SERPL-MCNC: 1 MG/DL (ref 0.2–1.3)
BILIRUB SERPL-MCNC: 1.3 MG/DL (ref 0.2–1.3)
BUN SERPL-MCNC: 16 MG/DL (ref 7–30)
BUN SERPL-MCNC: 16 MG/DL (ref 7–30)
CALCIUM SERPL-MCNC: 8 MG/DL (ref 8.5–10.1)
CALCIUM SERPL-MCNC: 8 MG/DL (ref 8.5–10.1)
CHLORIDE BLD-SCNC: 100 MMOL/L (ref 94–109)
CHLORIDE BLD-SCNC: 104 MMOL/L (ref 94–109)
CHOLEST SERPL-MCNC: 79 MG/DL
CO2 SERPL-SCNC: 24 MMOL/L (ref 20–32)
CO2 SERPL-SCNC: 26 MMOL/L (ref 20–32)
CREAT SERPL-MCNC: 0.63 MG/DL (ref 0.66–1.25)
CREAT SERPL-MCNC: 0.87 MG/DL (ref 0.66–1.25)
ERYTHROCYTE [DISTWIDTH] IN BLOOD BY AUTOMATED COUNT: 19.3 % (ref 10–15)
GFR SERPL CREATININE-BSD FRML MDRD: >90 ML/MIN/1.73M2
GFR SERPL CREATININE-BSD FRML MDRD: >90 ML/MIN/1.73M2
GLUCOSE BLD-MCNC: 136 MG/DL (ref 70–99)
GLUCOSE BLD-MCNC: 96 MG/DL (ref 70–99)
GLUCOSE BLDC GLUCOMTR-MCNC: 147 MG/DL (ref 70–99)
GLUCOSE BLDC GLUCOMTR-MCNC: 79 MG/DL (ref 70–99)
HCO3 BLDV-SCNC: 23 MMOL/L (ref 21–28)
HCO3 BLDV-SCNC: 26 MMOL/L (ref 21–28)
HCO3 BLDV-SCNC: 27 MMOL/L (ref 21–28)
HCO3 BLDV-SCNC: 27 MMOL/L (ref 21–28)
HCO3 BLDV-SCNC: 28 MMOL/L (ref 21–28)
HCT VFR BLD AUTO: 26.7 % (ref 40–53)
HDLC SERPL-MCNC: 30 MG/DL
HGB BLD-MCNC: 8.3 G/DL (ref 13.3–17.7)
INR PPP: 1.97 (ref 0.85–1.15)
LACTATE SERPL-SCNC: 0.8 MMOL/L (ref 0.7–2)
LACTATE SERPL-SCNC: 0.8 MMOL/L (ref 0.7–2)
LACTATE SERPL-SCNC: 1 MMOL/L (ref 0.7–2)
LACTATE SERPL-SCNC: 1.2 MMOL/L (ref 0.7–2)
LDLC SERPL CALC-MCNC: 38 MG/DL
MAGNESIUM SERPL-MCNC: 2 MG/DL (ref 1.6–2.3)
MAGNESIUM SERPL-MCNC: 2.2 MG/DL (ref 1.6–2.3)
MCH RBC QN AUTO: 29.3 PG (ref 26.5–33)
MCHC RBC AUTO-ENTMCNC: 31.1 G/DL (ref 31.5–36.5)
MCV RBC AUTO: 94 FL (ref 78–100)
NONHDLC SERPL-MCNC: 49 MG/DL
O2/TOTAL GAS SETTING VFR VENT: 2 %
O2/TOTAL GAS SETTING VFR VENT: 21 %
OXYHGB MFR BLDV: 42 % (ref 70–75)
OXYHGB MFR BLDV: 46 % (ref 70–75)
OXYHGB MFR BLDV: 46 % (ref 70–75)
OXYHGB MFR BLDV: 49 % (ref 70–75)
OXYHGB MFR BLDV: 54 % (ref 70–75)
PCO2 BLDV: 37 MM HG (ref 40–50)
PCO2 BLDV: 42 MM HG (ref 40–50)
PCO2 BLDV: 43 MM HG (ref 40–50)
PCO2 BLDV: 44 MM HG (ref 40–50)
PCO2 BLDV: 44 MM HG (ref 40–50)
PH BLDV: 7.38 [PH] (ref 7.32–7.43)
PH BLDV: 7.4 [PH] (ref 7.32–7.43)
PH BLDV: 7.4 [PH] (ref 7.32–7.43)
PH BLDV: 7.41 [PH] (ref 7.32–7.43)
PH BLDV: 7.42 [PH] (ref 7.32–7.43)
PHOSPHATE SERPL-MCNC: 2.4 MG/DL (ref 2.5–4.5)
PHOSPHATE SERPL-MCNC: 3.4 MG/DL (ref 2.5–4.5)
PLATELET # BLD AUTO: 197 10E3/UL (ref 150–450)
PO2 BLDV: 28 MM HG (ref 25–47)
PO2 BLDV: 30 MM HG (ref 25–47)
PO2 BLDV: 33 MM HG (ref 25–47)
POTASSIUM BLD-SCNC: 2.7 MMOL/L (ref 3.4–5.3)
POTASSIUM BLD-SCNC: 3.2 MMOL/L (ref 3.4–5.3)
POTASSIUM BLD-SCNC: 4.2 MMOL/L (ref 3.4–5.3)
POTASSIUM BLD-SCNC: 6.4 MMOL/L (ref 3.4–5.3)
PROT SERPL-MCNC: 6.1 G/DL (ref 6.8–8.8)
PROT SERPL-MCNC: 6.2 G/DL (ref 6.8–8.8)
RBC # BLD AUTO: 2.83 10E6/UL (ref 4.4–5.9)
SARS-COV-2 RNA RESP QL NAA+PROBE: NEGATIVE
SODIUM SERPL-SCNC: 135 MMOL/L (ref 133–144)
SODIUM SERPL-SCNC: 136 MMOL/L (ref 133–144)
TRIGL SERPL-MCNC: 53 MG/DL
UFH PPP CHRO-ACNC: 0.32 IU/ML
UFH PPP CHRO-ACNC: 0.46 IU/ML
UFH PPP CHRO-ACNC: <0.1 IU/ML
WBC # BLD AUTO: 4.7 10E3/UL (ref 4–11)

## 2022-06-18 PROCEDURE — 85610 PROTHROMBIN TIME: CPT | Performed by: INTERNAL MEDICINE

## 2022-06-18 PROCEDURE — 99231 SBSQ HOSP IP/OBS SF/LOW 25: CPT | Mod: GC | Performed by: THORACIC SURGERY (CARDIOTHORACIC VASCULAR SURGERY)

## 2022-06-18 PROCEDURE — 85027 COMPLETE CBC AUTOMATED: CPT | Performed by: INTERNAL MEDICINE

## 2022-06-18 PROCEDURE — 84132 ASSAY OF SERUM POTASSIUM: CPT | Performed by: INTERNAL MEDICINE

## 2022-06-18 PROCEDURE — 83605 ASSAY OF LACTIC ACID: CPT

## 2022-06-18 PROCEDURE — 85520 HEPARIN ASSAY: CPT | Performed by: INTERNAL MEDICINE

## 2022-06-18 PROCEDURE — 250N000011 HC RX IP 250 OP 636: Performed by: INTERNAL MEDICINE

## 2022-06-18 PROCEDURE — 80061 LIPID PANEL: CPT | Performed by: INTERNAL MEDICINE

## 2022-06-18 PROCEDURE — 250N000011 HC RX IP 250 OP 636

## 2022-06-18 PROCEDURE — U0003 INFECTIOUS AGENT DETECTION BY NUCLEIC ACID (DNA OR RNA); SEVERE ACUTE RESPIRATORY SYNDROME CORONAVIRUS 2 (SARS-COV-2) (CORONAVIRUS DISEASE [COVID-19]), AMPLIFIED PROBE TECHNIQUE, MAKING USE OF HIGH THROUGHPUT TECHNOLOGIES AS DESCRIBED BY CMS-2020-01-R: HCPCS | Performed by: INTERNAL MEDICINE

## 2022-06-18 PROCEDURE — 80053 COMPREHEN METABOLIC PANEL: CPT

## 2022-06-18 PROCEDURE — 85520 HEPARIN ASSAY: CPT | Performed by: STUDENT IN AN ORGANIZED HEALTH CARE EDUCATION/TRAINING PROGRAM

## 2022-06-18 PROCEDURE — 93010 ELECTROCARDIOGRAM REPORT: CPT | Performed by: INTERNAL MEDICINE

## 2022-06-18 PROCEDURE — 200N000002 HC R&B ICU UMMC

## 2022-06-18 PROCEDURE — 3E03317 INTRODUCTION OF OTHER THROMBOLYTIC INTO PERIPHERAL VEIN, PERCUTANEOUS APPROACH: ICD-10-PCS | Performed by: INTERNAL MEDICINE

## 2022-06-18 PROCEDURE — 999N000248 HC STATISTIC IV INSERT WITH US BY RN

## 2022-06-18 PROCEDURE — 82805 BLOOD GASES W/O2 SATURATION: CPT

## 2022-06-18 PROCEDURE — 999N000075 HC STATISTIC IABP MONITORING

## 2022-06-18 PROCEDURE — 250N000011 HC RX IP 250 OP 636: Performed by: STUDENT IN AN ORGANIZED HEALTH CARE EDUCATION/TRAINING PROGRAM

## 2022-06-18 PROCEDURE — 99291 CRITICAL CARE FIRST HOUR: CPT | Mod: GC | Performed by: INTERNAL MEDICINE

## 2022-06-18 PROCEDURE — 71045 X-RAY EXAM CHEST 1 VIEW: CPT

## 2022-06-18 PROCEDURE — 250N000013 HC RX MED GY IP 250 OP 250 PS 637

## 2022-06-18 PROCEDURE — 83735 ASSAY OF MAGNESIUM: CPT | Performed by: INTERNAL MEDICINE

## 2022-06-18 PROCEDURE — 84155 ASSAY OF PROTEIN SERUM: CPT

## 2022-06-18 PROCEDURE — 258N000003 HC RX IP 258 OP 636: Performed by: INTERNAL MEDICINE

## 2022-06-18 PROCEDURE — 84132 ASSAY OF SERUM POTASSIUM: CPT

## 2022-06-18 PROCEDURE — 250N000009 HC RX 250: Performed by: INTERNAL MEDICINE

## 2022-06-18 PROCEDURE — 71045 X-RAY EXAM CHEST 1 VIEW: CPT | Mod: 26 | Performed by: RADIOLOGY

## 2022-06-18 PROCEDURE — 250N000013 HC RX MED GY IP 250 OP 250 PS 637: Performed by: STUDENT IN AN ORGANIZED HEALTH CARE EDUCATION/TRAINING PROGRAM

## 2022-06-18 PROCEDURE — 84100 ASSAY OF PHOSPHORUS: CPT | Performed by: INTERNAL MEDICINE

## 2022-06-18 PROCEDURE — 93005 ELECTROCARDIOGRAM TRACING: CPT

## 2022-06-18 RX ORDER — FUROSEMIDE 10 MG/ML
80 INJECTION INTRAMUSCULAR; INTRAVENOUS ONCE
Status: COMPLETED | OUTPATIENT
Start: 2022-06-18 | End: 2022-06-18

## 2022-06-18 RX ORDER — FUROSEMIDE 10 MG/ML
80 INJECTION INTRAMUSCULAR; INTRAVENOUS
Status: DISCONTINUED | OUTPATIENT
Start: 2022-06-18 | End: 2022-06-18

## 2022-06-18 RX ORDER — ACETAMINOPHEN 325 MG/1
325 TABLET ORAL EVERY 4 HOURS PRN
Status: DISCONTINUED | OUTPATIENT
Start: 2022-06-18 | End: 2022-06-28

## 2022-06-18 RX ORDER — HYDROXYZINE HYDROCHLORIDE 25 MG/1
25 TABLET, FILM COATED ORAL EVERY 6 HOURS PRN
Status: DISCONTINUED | OUTPATIENT
Start: 2022-06-18 | End: 2022-06-18

## 2022-06-18 RX ORDER — HYDRALAZINE HYDROCHLORIDE 25 MG/1
25 TABLET, FILM COATED ORAL EVERY 8 HOURS SCHEDULED
Status: DISCONTINUED | OUTPATIENT
Start: 2022-06-18 | End: 2022-06-19

## 2022-06-18 RX ORDER — POTASSIUM CHLORIDE 1.5 G/1.58G
20 POWDER, FOR SOLUTION ORAL
Status: DISCONTINUED | OUTPATIENT
Start: 2022-06-18 | End: 2022-06-26

## 2022-06-18 RX ORDER — POTASSIUM CHLORIDE 29.8 MG/ML
20 INJECTION INTRAVENOUS
Status: COMPLETED | OUTPATIENT
Start: 2022-06-18 | End: 2022-06-18

## 2022-06-18 RX ORDER — HYDROXYZINE HYDROCHLORIDE 25 MG/1
25 TABLET, FILM COATED ORAL EVERY 6 HOURS PRN
Status: DISCONTINUED | OUTPATIENT
Start: 2022-06-18 | End: 2022-07-08

## 2022-06-18 RX ORDER — POTASSIUM CHLORIDE 29.8 MG/ML
20 INJECTION INTRAVENOUS ONCE
Status: COMPLETED | OUTPATIENT
Start: 2022-06-18 | End: 2022-06-18

## 2022-06-18 RX ORDER — FUROSEMIDE 10 MG/ML
80 INJECTION INTRAMUSCULAR; INTRAVENOUS
Status: DISCONTINUED | OUTPATIENT
Start: 2022-06-19 | End: 2022-06-23

## 2022-06-18 RX ORDER — POTASSIUM CHLORIDE 1.5 G/1.58G
40 POWDER, FOR SOLUTION ORAL ONCE
Status: COMPLETED | OUTPATIENT
Start: 2022-06-18 | End: 2022-06-18

## 2022-06-18 RX ORDER — MAGNESIUM SULFATE HEPTAHYDRATE 40 MG/ML
2 INJECTION, SOLUTION INTRAVENOUS ONCE
Status: COMPLETED | OUTPATIENT
Start: 2022-06-18 | End: 2022-06-18

## 2022-06-18 RX ORDER — FUROSEMIDE 10 MG/ML
80 INJECTION INTRAMUSCULAR; INTRAVENOUS ONCE
Status: DISCONTINUED | OUTPATIENT
Start: 2022-06-18 | End: 2022-06-18

## 2022-06-18 RX ADMIN — SODIUM PHOSPHATE, MONOBASIC, MONOHYDRATE AND SODIUM PHOSPHATE, DIBASIC, ANHYDROUS 9 MMOL: 276; 142 INJECTION, SOLUTION INTRAVENOUS at 23:19

## 2022-06-18 RX ADMIN — DULOXETINE 30 MG: 30 CAPSULE, DELAYED RELEASE ORAL at 08:00

## 2022-06-18 RX ADMIN — FUROSEMIDE 80 MG: 10 INJECTION, SOLUTION INTRAVENOUS at 14:50

## 2022-06-18 RX ADMIN — HYDRALAZINE HYDROCHLORIDE 25 MG: 25 TABLET, FILM COATED ORAL at 14:23

## 2022-06-18 RX ADMIN — HYDRALAZINE HYDROCHLORIDE 25 MG: 25 TABLET, FILM COATED ORAL at 21:22

## 2022-06-18 RX ADMIN — MAGNESIUM SULFATE IN WATER 2 G: 40 INJECTION, SOLUTION INTRAVENOUS at 05:18

## 2022-06-18 RX ADMIN — HYDRALAZINE HYDROCHLORIDE 25 MG: 25 TABLET, FILM COATED ORAL at 08:01

## 2022-06-18 RX ADMIN — ACETAMINOPHEN 325 MG: 325 TABLET, FILM COATED ORAL at 02:29

## 2022-06-18 RX ADMIN — POTASSIUM CHLORIDE 20 MEQ: 29.8 INJECTION INTRAVENOUS at 06:41

## 2022-06-18 RX ADMIN — ACETAMINOPHEN 325 MG: 325 TABLET, FILM COATED ORAL at 12:01

## 2022-06-18 RX ADMIN — POTASSIUM CHLORIDE 20 MEQ: 29.8 INJECTION, SOLUTION INTRAVENOUS at 18:43

## 2022-06-18 RX ADMIN — HEPARIN SODIUM 1100 UNITS/HR: 10000 INJECTION, SOLUTION INTRAVENOUS at 17:29

## 2022-06-18 RX ADMIN — POTASSIUM CHLORIDE 20 MEQ: 29.8 INJECTION, SOLUTION INTRAVENOUS at 09:18

## 2022-06-18 RX ADMIN — POTASSIUM CHLORIDE 20 MEQ: 29.8 INJECTION INTRAVENOUS at 05:18

## 2022-06-18 RX ADMIN — ACETAMINOPHEN 325 MG: 325 TABLET, FILM COATED ORAL at 21:13

## 2022-06-18 RX ADMIN — HYDROXYZINE HYDROCHLORIDE 25 MG: 25 TABLET, FILM COATED ORAL at 21:13

## 2022-06-18 RX ADMIN — POTASSIUM CHLORIDE 20 MEQ: 29.8 INJECTION INTRAVENOUS at 07:50

## 2022-06-18 RX ADMIN — HYDROXYCHLOROQUINE SULFATE 200 MG: 200 TABLET, FILM COATED ORAL at 19:55

## 2022-06-18 RX ADMIN — FUROSEMIDE 80 MG: 10 INJECTION, SOLUTION INTRAVENOUS at 19:55

## 2022-06-18 RX ADMIN — ASPIRIN 81 MG CHEWABLE TABLET 81 MG: 81 TABLET CHEWABLE at 08:00

## 2022-06-18 RX ADMIN — DOBUTAMINE HYDROCHLORIDE 7.5 MCG/KG/MIN: 200 INJECTION INTRAVENOUS at 09:26

## 2022-06-18 RX ADMIN — HYDROXYZINE HYDROCHLORIDE 25 MG: 25 TABLET, FILM COATED ORAL at 14:52

## 2022-06-18 RX ADMIN — POTASSIUM CHLORIDE 20 MEQ: 29.8 INJECTION, SOLUTION INTRAVENOUS at 17:40

## 2022-06-18 RX ADMIN — TAMSULOSIN HYDROCHLORIDE 0.4 MG: 0.4 CAPSULE ORAL at 08:00

## 2022-06-18 RX ADMIN — POTASSIUM CHLORIDE 20 MEQ: 1.5 POWDER, FOR SOLUTION ORAL at 17:39

## 2022-06-18 RX ADMIN — POTASSIUM CHLORIDE 40 MEQ: 1.5 POWDER, FOR SOLUTION ORAL at 09:18

## 2022-06-18 RX ADMIN — HYDROXYCHLOROQUINE SULFATE 200 MG: 200 TABLET, FILM COATED ORAL at 08:02

## 2022-06-18 ASSESSMENT — ACTIVITIES OF DAILY LIVING (ADL)
ADLS_ACUITY_SCORE: 30

## 2022-06-18 NOTE — CONSULTS
Cardiac Surgery   Consult Note     Dandy Sands  Code Status: Full Code  Primary Care Physician: Scar Jeffrey   : 1960   Age: 61 year old     Date of Service: 2022     Subjective     Chief Complaint: Heart Failure    History of present illness:     60 y/o M who has been undergoing workup for LVAD vs transplant as outpatient who was admitted to OSH with increased shortness of breath and worsening heart failure symptoms. He was given an IABP having had already been on increased dose of dobutamine and we are being asked to see the patient for consideration of subclavian IABP placement.    He currently notes he feels better overall, shortness of breath is improved from admission. The rest of his histories are as below but notable for lupus and clotting disorder on coumadin for the same.     Subjective   Review of Systems:   General- denies any fevers, chills, or night sweats  HEENT- denies headache, change in vision or change in hearing, sore throat, or rhinorrhea  Cardiac- denies chest pain or palpitations  Pulmonary- had shortness of breath but no cough  GI- denies any abd pain, nausea, vomiting, constipation, or diarrhea, no hematochezia  - denies any dysuria, hematuria, or urinary changes  Skin- denies any skin rashes or skin changes  MSK- denies any muscle pains  Psych- denies any suicidal or homicidal ideation  Neuro- denies dizziness, syncope, or tremors  Endocrine- denies heat or cold intolerance     Patient Active Problem List   Diagnosis     Decompensated heart failure (H)     Cardiogenic shock (H)     No past medical history on file.  Past Surgical History:   Procedure Laterality Date     COLONOSCOPY N/A 2022    Procedure: COLONOSCOPY;  Surgeon: Parth Meneses MD;  Location: [facility] OR     CV CORONARY ANGIOGRAM N/A 2022    Procedure: Coronary Angiogram;  Surgeon: Mike Pope MD;  Location:  [facility]ARD[facility] CATH LAB     CV CORONARY ANGIOGRAM N/A 2022     Procedure: Coronary Angiogram;  Surgeon: Austin Bright MD;  Location:  [facility]     CV CORONARY LITHOTRIPSY PCI Bilateral 5/24/2022    Procedure: Percutaneous Coronary Intervention - Lithotripsy;  Surgeon: Mike Pope MD;  Location:  [facility]ARD[facility]ATH [facility]     CV INTRAVASULAR ULTRASOUND N/A 5/24/2022    Procedure: Intravascular Ultrasound;  Surgeon: Mike Pope MD;  Location:  [facility]ARD[facility] CATH LAB     CV PCI ANGIOPLASTY N/A 5/29/2022    Procedure: Percutaneous Transluminal Angioplasty;  Surgeon: Austin Bright MD;  [facility]: [facility]     CV PCI STENT DRUG ELUTING N/A 5/24/2022    Procedure: Percutaneous Coronary Intervention Stent;  Surgeon: Mike Pope MD;  Location:  HE[facility] CARDIAC CATH LAB     CV RIGHT HEART CATH MEASUREMENTS RECORDED N/A 05/18/2022    Procedure: Right Heart Catheterization;  Surgeon: Justo Stewart MD;  Location:  HEART CARDIAC CATH LAB     CV RIGHT HEART CATH MEASUREMENTS RECORDED N/A 5/24/2022    Procedure: Right Heart Catheterization;  Surgeon: Mike Pope MD;  Location:  HE[facility] CARDIAC CATH LAB     CV RIGHT HEART CATH MEASUREMENTS RECORDED N/A 5/29/2022    Procedure: Right Heart Catheterization;  Surgeon: Austin Bright MD;  Location:  HE[facility] CARDIAC CATH LAB     CV RIGHT HEART EXERCISE STRESS STUDY N/A 05/18/2022    Procedure: Stress Drug Study;  Surgeon: Justo Stewart MD;  Location:  HEART CARDIAC CATH LAB     PICC DOUBLE LUMEN PLACEMENT Right 05/28/2022    right basilic 5 fr dl picc 40 cm     Social History     Socioeconomic History     Marital status: Single     Spouse name: Not on file     Number of children: Not on file     Years of education: Not on file     Highest education level: Not on file   Occupational History     Not on file   Tobacco Use     Smoking status: Not on file     Smokeless tobacco: Not on file   Substance and Sexual Activity     Alcohol use: Not on file     Drug use: Not on file      Sexual activity: Not on file   Other Topics Concern     Not on file   Social History Narrative     Not on file     Social Determinants of Health     Financial Resource Strain: Not on file   Food Insecurity: Not on file   Transportation Needs: Not on file   Physical Activity: Not on file   Stress: Not on file   Social Connections: Not on file   Intimate Partner Violence: Not on file   Housing Stability: Not on file     No family history on file.  Medications Prior to Admission   Medication Sig Dispense Refill Last Dose     atorvastatin (LIPITOR) 40 MG tablet Take 40 mg by mouth daily   Past Week at Unknown time     clopidogrel (PLAVIX) 75 MG tablet Take 1 tablet (75 mg) by mouth daily 30 tablet 3 6/17/2022 at Unknown time     DULoxetine (CYMBALTA) 30 MG capsule Take 1 capsule (30 mg) by mouth daily 30 capsule 0 6/17/2022 at Unknown time     famotidine (PEPCID) 20 MG tablet Take 20 mg by mouth 2 times daily   6/17/2022 at Unknown time     ferrous sulfate (FE TABS) 325 (65 Fe) MG EC tablet Take 325 mg by mouth daily (with lunch)   Past Week at Unknown time     furosemide (LASIX) 40 MG tablet Take 1 tablet (40 mg) by mouth daily 60 tablet 3 Past Week at Unknown time     hydroxychloroquine (PLAQUENIL) 200 MG tablet Take 200 mg by mouth 2 times daily   6/17/2022 at 0800     hydrOXYzine (ATARAX) 25 MG tablet Take 1 tablet (25 mg) by mouth every 6 hours as needed for anxiety (ANXIETY) 60 tablet 3 6/17/2022 at 1245     LORazepam (ATIVAN) 0.5 MG tablet Take 0.5-1 mg by mouth every 4 hours as needed for anxiety   Past Week at Unknown time     melatonin 3 MG tablet Take 6 mg by mouth nightly as needed for sleep   6/16/2022 at 2100     mycophenolate (GENERIC EQUIVALENT) 500 MG tablet Take 1,500 mg by mouth 2 times daily   6/17/2022 at 0800     potassium chloride ER (KLOR-CON M) 20 MEQ CR tablet Take 1 tablet (20 mEq) by mouth 2 times daily 60 tablet 1 Past Week at Unknown time     promethazine (PHENERGAN) 12.5 MG tablet Take 1  "tablet (12.5 mg) by mouth every 6 hours as needed for nausea or vomiting 30 tablet 1 6/16/2022 at 1200     tamsulosin (FLOMAX) 0.4 MG capsule Take 0.4 mg by mouth daily   6/17/2022 at Unknown time     thiamine (B-1) 100 MG tablet Take 1 tablet (100 mg) by mouth daily 30 tablet 0 6/17/2022 at Unknown time     warfarin ANTICOAGULANT (COUMADIN) 5 MG tablet Take 1 tablet (5 mg) by mouth daily Get an INR on Friday 5/27 and then follow instructions by your coumadin clinic 30 tablet 3 6/16/2022 at 1800     zolpidem (AMBIEN) 5 MG tablet Take 5 mg by mouth nightly as needed for sleep   Unknown at Unknown time     No Known Allergies       Objective   Objective   /82   Pulse 72   Temp 97  F (36.1  C) (Tympanic)   Resp 23   Ht 1.702 m (5' 7\")   Wt 68.5 kg (151 lb 0.2 oz)   SpO2 100%   BMI 23.65 kg/m      Temp  Min: 96.3  F (35.7  C)  Max: 98  F (36.7  C)  BP  Min: 88/66  Max: 158/92     Intake/Output Summary (Last 24 hours) at 6/18/2022 1709  Last data filed at 6/18/2022 1700  Gross per 24 hour   Intake 2416.57 ml   Output 2815 ml   Net -398.43 ml     I/O last 3 completed shifts:  In: 2328.17 [P.O.:730; I.V.:948.17]  Out: 1790 [Urine:1790]     Physical Exam:   Gen- alert and oriented x3, NAD  HEENT- NC/AT, EOMI  Cardiac- RRR  Pulm- normal respiratory effort on NC  Abd- soft, NT/ND, no rebound or guarding  - deferred  Extremities- no peripheral edema  Neuro- gross motor intact  Skin- no obvious rashes, bruises, or cuts     Pertinent Labs: Reviewed.     Arterial Blood Gases   No lab results found in last 7 days.    Complete Blood Count   Recent Labs   Lab 06/18/22  0340 06/17/22  2147 06/17/22  1746   WBC 4.7 5.7 6.9   HGB 8.3* 8.5* 9.2*    207 252       Basic Metabolic Panel  Recent Labs   Lab 06/18/22  1617 06/18/22  1109 06/18/22  0334 06/17/22  1746     --  135 133   POTASSIUM 3.2* 4.2 2.7* 3.9   CHLORIDE 104  --  100 100   CO2  --   --  24 19*   BUN  --   --  16 20   CR  --   --  0.63* 1.23 "       Liver Function Tests  Recent Labs   Lab 06/18/22  0334 06/17/22  1746   AST 1,282* 1,511*   ALT 1,725* 1,856*   ALKPHOS 110 126   BILITOTAL 1.3 1.2   ALBUMIN 3.0* 3.2*   INR 1.97* 3.70*       Coagulation Profile  Recent Labs   Lab 06/18/22  0334 06/17/22  1746   INR 1.97* 3.70*          Pertinent Imaging:  Recent Results (from the past 24 hour(s))   XR Chest Port 1 View    Narrative    EXAM: XR CHEST PORT 1 VIEW  6/17/2022 6:41 PM      HISTORY: dyspnea    COMPARISON: 5/20/2022    FINDINGS: Single view of the chest. Left chest wall cardiac device in  leads in stable position. Right arm PICC tip in the high SVC. Trachea  is midline. Mild cardiomegaly. Mild perihilar and bibasilar  interstitial and airspace opacities bilaterally. No large effusion. No  appreciable pneumothorax.      Impression    IMPRESSION:  Perihilar and bibasilar mixed opacities favored to represent mild  pulmonary edema. Infection not excluded.    I have personally reviewed the examination and initial interpretation  and I agree with the findings.    JILL CARRANZA MD         SYSTEM ID:  [id number]   Cardiac Catheterization    Narrative      Successful [place] janae catheter placement through the right internal   jugular vein and 50 cc intra-aortic balloon pump insertion through the   right common femoral artery.      XR Chest Port 1 View    Narrative    EXAM: XR Chest 1 view 6/18/2022 1:16 AM      HISTORY: dyspnea; device placement monitoring.    COMPARISON: Previous day.     TECHNIQUE: Frontal view of the chest.    FINDINGS: Right IJ [place]-Janae catheter with tip in the main pulmonary  artery. Left chest wall ICD with leads in stable position.  Intra-aortic balloon pump with tip at level of T3-4, at the aortic  arch. Right-sided PICC with tip in the low SVC.  Trachea is midline. Cardiac and mediastinal silhouette is stable.  Decreased hazy pulmonary opacities. No pleural effusions. No  pneumothoraces.      Impression    IMPRESSION:   1. Decreased  hazy pulmonary opacities.  2. Right IJ [place]-Janae catheter with tip in the main pulmonary artery  3. Intra-aortic balloon pump with tip at the level of T3-4, at the  distalmost aortic arch.    I have personally reviewed the examination and initial interpretation  and I agree with the findings.    CONSTANTINO OWEN MD         SYSTEM ID:  [id number]          Current Medications     S  C  H  E  D   aspirin  81 mg Oral Daily     DULoxetine  30 mg Oral Daily     [START ON 6/19/2022] furosemide  80 mg Intravenous BID     hydrALAZINE  25 mg Oral Q8H CAMMY     hydroxychloroquine  200 mg Oral BID     potassium chloride  20 mEq Oral BID     tamsulosin  0.4 mg Oral Daily      G  T  T   DOBUTamine 7.5 mcg/kg/min (06/18/22 1600)     heparin 1,100 Units/hr (06/18/22 1600)      P  R  N acetaminophen, HOLD MEDICATION, hydrOXYzine         Assessment     61 year old male with worsening heart failure undergoing work-up for LVAD vs transplant and requiring femoral IABP for current support     Plan     - seems like a suitable candidate for subclavian IABP  - will discuss timing with staff and OR availability  - will discuss need to hold coumadin for the above vs continuing throughout the case       Signed:    Miguel Iglesias MD 6/18/2022 at 5:09 PM  Cardiothoracic Surgery Fellow  Pager: [phone]

## 2022-06-18 NOTE — PROGRESS NOTES
"Visited Pt Dandy to offer spiritual/emotioinal support as Pt requested upon admission.  Pt shared ways he is coping with the illness, stating \"I am Mand[employer], been in my [employer] for over 35 years\" though none of \"family attends [employer]\" Pt Dandy has been actively involved including through a support group gives Pt hope to trust.  Pt is surround by supportive family, Pt son and daughter will share time visiting with Pt, --- Pt expecting their visit this weekend.  I listened empathetically, offered calm presence and a sense of community. Together we explored hope and the meaning making coping with illness, and focusing on the journey of healing.   Pt shared that he feels the outside is really good today, expressing the beauty of the blue manjit and the cool nature.  I provided spiritual/emotional support, listening presence, affirmations, validations, and shared prayer as Pt had requested.    Pt Dandy will benefit from a follow up by unit  during this hospitalization  "

## 2022-06-18 NOTE — PROGRESS NOTES
[facility]    Cardiology Progress Note- Cardiology        Date of Admission:  6/17/2022     Assessment & Plan: NICOLASL   n summary, 61-year-old man with history of ischemic cardiomyopathy LVEF 15%) NYHA class IV symptoms ACC stage D presenting with multi-organ failure (JOSE; Acute liver injury/shock liver) after multiple admissions for heart failure over the past several months for evaluation of advanced therapies.    Today's Changes:  - PFT's  - Pulmonary consult (emphysematous changes on CT)  - Replete K/Mg, redose diuretics  - CVTS consult re: subclavian impella  - Upper extremity ultrasound (subclavian impella)     A) Cardiovascular     Ambulatory cardiogenic shock  Acute on chronic systolic heart failure  Advanced ischemic cardiomyopathy, Class IV, Stage D     Last TTE on 15: EF %; Normal RV function; LVIDd 6.8 cm         Plan:  -- Inotropes: Dobutamine 5 mcg --> 7.5  -- MCS: likely will need iABP  -- Fluid status: Hypervolemic. Diuretic: 80mg IV lasix x1, redose as needed  -- Afterload reduction: Continue iABP; start hydral 25 mg Q8H, plan to initiate isordil later  -- BB: cardiogenic shock  -- Aldosterone antagonist: cardiogenic shock  -- Anticoagulation: hep gtt once iABP in place  -- Antiplatelet: Continue ASA-81mg, hold plavix  -- Statin: hold statin   -- Device: ICD   -- Hemodynamics QID along with Lactic acid  -- BMP BID, K>4 & Mg>2  -- Strict I/Os  -- Daily weight  -- Low salt diet <2 g/24 hours        B) Neuro  Intermittent somnolence           C) Pulm  No data recorded  No lab results found in last 7 days.           D) GI  Shock liver secondary to low cardiac output  - Diet: Low salt diet <2 g/24 hours  - Bowel regimen: colace  - LFTs: shock liver  - GI prophylaxis: PPI      E) Renal  Acute kidney injury  - Creatinine 1.09      No intake or output data in the 24 hours ending 06/17/22 1639        F) Heme  DVT Prophylaxis: Hep gtt        G) ID  NA     H)  Endo  NA     Fitzgerald Catheter: in place, indication: Strict 1-2 Hour I&O, Strict 1-2 Hour I&O  Code Status: Full    Lines/tubes: SGC and iABP; right PICC in place        Disposition Plan   Expected discharge: once advanced heart failure work-up completed.        Patient discussed with staff attending, Dr. Lora.     Juancho Galan MD, MSc  Cardiovascular Disease Fellow  [facility]  ______________________________________________________________________    Interval History   Received SGC and iABP yesterday  PA-catheter waveform measurements and hemodynamic calculations not performed in cath lab due to urgency of procedure  CVP port found to be thrombosed upon arrival to CCU  TPA flushed CVP port, first set of accurate hemodynamics obtained at 4:00am (please disregard prior)      LVAD/Transplant Evaluation Checklist  [x]?????? labs (CBC, CMP, PT/INR, cystatin C, prealbumin, UA + micro)  [x]?????? Infectious (Hep A/B/C, HIV, treponema, HSV 1/2 IgG, CMV IgG, Toxo IgG, EBV IgG, varicella IgG, Quant gold, COVID vaccine/PCR)  [x]?????? Utox/nicotine and cotinine/PeTH   []?????? Immunocompatibility (last transfusion, ABO, HLA tissue typing, PRA): in process  [x]?????? ECG, Echo  []?????? CPX - can be oupatient  []?????? 6MWT - can be outpatient  [x]?????? RHC, completed but needs repeat in 2-3 months to assess PVR  [x]?????? CVTS consult  [x]?????? Social work consult  [x]??????  Palliative care consult: final note pending  [x]?????? Neuropsych consult: order placed: final note pending  [x]?????? Nutrition consult  [x]?????? CT Dental   [x]?????? Dental consult  [x]?????? Abd US + doppler  - Abd US complete done, not with doppler; OK given unremarkable CT CAP  [x]?????? Extremity US and ABIs  [x]?????? Carotid US (if DM or ICM or >51yo)  []?????? PFTs:   []?????? Dexa:   [x]?????? CT head non-contrast  [x]?????? CT CAP non-contrast  [x]?????? Colonoscopy (>49 yo)  [x]?????? PSA/HCG    Data  reviewed today: I reviewed all medications, new labs and imaging results over the last 24 hours. I personally reviewed the EKG tracing showing sinus tachycardia (tele); PA-C waveforms and calculated hemodynamics     Physical Exam   Vital Signs: Temp: 97.6  F (36.4  C) Temp src: Oral BP: 116/83 Pulse: 64   Resp: 21 SpO2: 100 % O2 Device: Nasal cannula Oxygen Delivery: 3 LPM  Weight: 151 lbs .24 oz    Gen: No acute distress  HEENT: PERRL, EOM intact, OP without exudates  PULM/THORAX: Rales at bases bilaterally  CV: RRR, normal S1 and S2, no murmurs or rubs. No JVD  ABD: distended; +fluid wave shift  EXT:  No LE edema. Palpable pulses  NEURO: CN II-XII grossly intact. A&Ox3  PSYCH: normal affect and mentation  SKIN: no rashes or lesions    Data   Recent Labs   Lab 06/18/22  0340 06/18/22  0338 06/18/22  0334 06/17/22  2215 06/17/22  2147 06/17/22  1751 06/17/22  1746   WBC 4.7  --   --   --  5.7  --  6.9   HGB 8.3*  --   --   --  8.5*  --  9.2*   MCV 94  --   --   --  94  --  95     --   --   --  207  --  252   INR  --   --  1.97*  --   --   --  3.70*   NA  --   --  135  --   --   --  133   POTASSIUM  --   --  2.7*  --   --   --  3.9   CHLORIDE  --   --  100  --   --   --  100   CO2  --   --  24  --   --   --  19*   BUN  --   --  16  --   --   --  20   CR  --   --  0.63*  --   --   --  1.23   ANIONGAP  --   --  11  --   --   --  14   ELDA  --   --  8.0*  --   --   --  8.6   GLC  --  147* 136* 95  --    < > 112*   ALBUMIN  --   --  3.0*  --   --   --  3.2*   PROTTOTAL  --   --  6.1*  --   --   --  6.5*   BILITOTAL  --   --  1.3  --   --   --  1.2   ALKPHOS  --   --  110  --   --   --  126   ALT  --   --  1,725*  --   --   --  1,856*   AST  --   --  1,282*  --   --   --  1,511*    < > = values in this interval not displayed.     Recent Results (from the past 24 hour(s))   XR Chest Port 1 View    Narrative    EXAM: XR CHEST PORT 1 VIEW  6/17/2022 6:41 PM      HISTORY: dyspnea    COMPARISON: 5/20/2022    FINDINGS:  Single view of the chest. Left chest wall cardiac device in  leads in stable position. Right arm PICC tip in the high SVC. Trachea  is midline. Mild cardiomegaly. Mild perihilar and bibasilar  interstitial and airspace opacities bilaterally. No large effusion. No  appreciable pneumothorax.      Impression    IMPRESSION:  Perihilar and bibasilar mixed opacities favored to represent mild  pulmonary edema. Infection not excluded.    I have personally reviewed the examination and initial interpretation  and I agree with the findings.    JILL CARRANZA MD         SYSTEM ID:  [id number]   Cardiac Catheterization    Narrative      Successful [place] janae catheter placement through the right internal   jugular vein and 50 cc intra-aortic balloon pump insertion through the   right common femoral artery.      XR Chest Port 1 View    Narrative    EXAM: XR Chest 1 view 6/18/2022 1:16 AM      HISTORY: dyspnea; device placement monitoring.    COMPARISON: Previous day.     TECHNIQUE: Frontal view of the chest.    FINDINGS: Right IJ [place]-Janae catheter with tip in the main pulmonary  artery. Left chest wall ICD with leads in stable position.  Intra-aortic balloon pump with tip at level of T3-4, at the aortic  arch. Right-sided PICC with tip in the low SVC.  Trachea is midline. Cardiac and mediastinal silhouette is stable.  Decreased hazy pulmonary opacities. No pleural effusions. No  pneumothoraces.      Impression    IMPRESSION:   1. Decreased hazy pulmonary opacities.  2. Right IJ [place]-Janae catheter with tip in the main pulmonary artery  3. Intra-aortic balloon pump with tip at the level of T3-4, at the  distalmost aortic arch.    I have personally reviewed the examination and initial interpretation  and I agree with the findings.    CONSTANTINO OWEN MD         SYSTEM ID:  [id number]     Medications     DOBUTamine 7.5 mcg/kg/min (06/18/22 0700)     heparin 1,100 Units/hr (06/18/22 0700)       aspirin  81 mg Oral Daily     DULoxetine   30 mg Oral Daily     hydrALAZINE  25 mg Oral Q8H CAMMY     hydroxychloroquine  200 mg Oral BID     potassium chloride  20 mEq Intravenous Q1H     tamsulosin  0.4 mg Oral Daily

## 2022-06-18 NOTE — PLAN OF CARE
Major Shift Events:      1) Neurologically intact. Pt slurs/mumbles words. Can be hard to understand. CVTS aware. lethargic throughout the night.   2) Right groin discomfort. Tylenol given with effect.   3) IABP & SWAN placed last night. SWAN numbers completed Q4h. See chart for information. Upon arrival to unit CVP clotted off with no blood return or wave form. Alteplase instilled. Waveform returned. Blood return noted. Line still sluggish.   4) Pt -150 &  MAPs in the 100's. for most of the night. CVTS updated. BP trending now -120 & MAP 70-80  5) 2-3L of O2 via nasal cannula over night. Pt snoring & breathing irregularly while asleep.   6) 2 units of FFP given for high INR prior to cath lab.   7)  potassium replaced per protocol. Mag replaced x1.     Plan: progress care as able  For vital signs and complete assessments, please see documentation flowsheets.

## 2022-06-18 NOTE — PLAN OF CARE
Neuro: intact, AOx4, PEARRLA, FINCH equal bilat  Cardio: SR, IABP 1:1, MAP >65, on dobutamine gtt, afebrile; K replaced, CVP 18-19, PAWP 20  Respiratory: on 2L NC, CLS  GI/: active bowel sounds, 2g Na diet with 2L FR; lasix given voiding adequately      Handoff given to following RN. For VS and complete assessments, please see documentation flowsheets.    Problem: Cardiac Output Decreased  Goal: Effective Cardiac Output  Intervention: Optimize Cardiac Output  Recent Flowsheet Documentation  Taken 6/18/2022 1600 by Layne Medina RN  VTE Prevention/Management: SCDs (sequential compression devices) on  Head of Bed (HOB) Positioning: HOB not elevated due to instrumentation present  Environmental Support: calm environment promoted  Taken 6/18/2022 1200 by Layne Medina RN  VTE Prevention/Management: SCDs (sequential compression devices) on  Head of Bed (HOB) Positioning: HOB not elevated due to instrumentation present  Environmental Support: calm environment promoted  Taken 6/18/2022 0800 by Layne Medina RN  VTE Prevention/Management: SCDs (sequential compression devices) on  Head of Bed (HOB) Positioning: HOB not elevated due to instrumentation present  Environmental Support: calm environment promoted   Goal Outcome Evaluation:

## 2022-06-18 NOTE — PROGRESS NOTES
[facility], Procedure Note          Intra-Aortic Balloon Pump Insertion:       Dandy Sands  MRN# [id number]   June 17, 2022 Indication: Cardiogenic shock           Procedure performed: June 17, 2022, 9:10 PM   Location: Cath Lab   Catheter size: Fiberoptic 50cc Balloon - 8 Fr   Inserted: 6 inch introducer sheath   Catheter placed: Right femoral artery   Complications:: None   Assist initiated: 1:1 ratio   Percent augmentation: 100%   Timing adjusted: Adjusted to achieve optimal assist   Verification of position: Fluoroscopy   Comments: None      IABP placed in CCL, transported back up to  without incident.     Recorded by Lisa Alvarez RT

## 2022-06-19 ENCOUNTER — APPOINTMENT (OUTPATIENT)
Dept: OCCUPATIONAL THERAPY | Facility: CLINIC | Age: 62
DRG: 001 | End: 2022-06-19
Attending: INTERNAL MEDICINE
Payer: COMMERCIAL

## 2022-06-19 ENCOUNTER — APPOINTMENT (OUTPATIENT)
Dept: GENERAL RADIOLOGY | Facility: CLINIC | Age: 62
DRG: 001 | End: 2022-06-19
Attending: INTERNAL MEDICINE
Payer: COMMERCIAL

## 2022-06-19 ENCOUNTER — ANESTHESIA EVENT (OUTPATIENT)
Dept: SURGERY | Facility: CLINIC | Age: 62
DRG: 001 | End: 2022-06-19
Payer: COMMERCIAL

## 2022-06-19 LAB
ALBUMIN SERPL-MCNC: 2.8 G/DL (ref 3.4–5)
ALBUMIN SERPL-MCNC: 2.8 G/DL (ref 3.4–5)
ALBUMIN SERPL-MCNC: 2.9 G/DL (ref 3.4–5)
ALBUMIN SERPL-MCNC: 2.9 G/DL (ref 3.4–5)
ALP SERPL-CCNC: 100 U/L (ref 40–150)
ALP SERPL-CCNC: 102 U/L (ref 40–150)
ALP SERPL-CCNC: 95 U/L (ref 40–150)
ALP SERPL-CCNC: 96 U/L (ref 40–150)
ALT SERPL W P-5'-P-CCNC: 1104 U/L (ref 0–70)
ALT SERPL W P-5'-P-CCNC: 1261 U/L (ref 0–70)
ALT SERPL W P-5'-P-CCNC: 1342 U/L (ref 0–70)
ALT SERPL W P-5'-P-CCNC: 974 U/L (ref 0–70)
ANION GAP SERPL CALCULATED.3IONS-SCNC: 5 MMOL/L (ref 3–14)
ANION GAP SERPL CALCULATED.3IONS-SCNC: 5 MMOL/L (ref 3–14)
ANION GAP SERPL CALCULATED.3IONS-SCNC: 6 MMOL/L (ref 3–14)
ANION GAP SERPL CALCULATED.3IONS-SCNC: 8 MMOL/L (ref 3–14)
AST SERPL W P-5'-P-CCNC: 293 U/L (ref 0–45)
AST SERPL W P-5'-P-CCNC: 368 U/L (ref 0–45)
AST SERPL W P-5'-P-CCNC: 469 U/L (ref 0–45)
AST SERPL W P-5'-P-CCNC: 586 U/L (ref 0–45)
BASE EXCESS BLDV CALC-SCNC: -0.1 MMOL/L (ref -7.7–1.9)
BASE EXCESS BLDV CALC-SCNC: -0.9 MMOL/L (ref -7.7–1.9)
BASE EXCESS BLDV CALC-SCNC: -1.3 MMOL/L (ref -7.7–1.9)
BASE EXCESS BLDV CALC-SCNC: 2.9 MMOL/L (ref -7.7–1.9)
BILIRUB SERPL-MCNC: 0.9 MG/DL (ref 0.2–1.3)
BILIRUB SERPL-MCNC: 1 MG/DL (ref 0.2–1.3)
BILIRUB SERPL-MCNC: 1.1 MG/DL (ref 0.2–1.3)
BILIRUB SERPL-MCNC: 1.4 MG/DL (ref 0.2–1.3)
BUN SERPL-MCNC: 10 MG/DL (ref 7–30)
BUN SERPL-MCNC: 10 MG/DL (ref 7–30)
BUN SERPL-MCNC: 11 MG/DL (ref 7–30)
BUN SERPL-MCNC: 12 MG/DL (ref 7–30)
CALCIUM SERPL-MCNC: 8 MG/DL (ref 8.5–10.1)
CALCIUM SERPL-MCNC: 8.1 MG/DL (ref 8.5–10.1)
CALCIUM SERPL-MCNC: 8.2 MG/DL (ref 8.5–10.1)
CALCIUM SERPL-MCNC: 8.4 MG/DL (ref 8.5–10.1)
CHLORIDE BLD-SCNC: 104 MMOL/L (ref 94–109)
CHLORIDE BLD-SCNC: 106 MMOL/L (ref 94–109)
CHLORIDE BLD-SCNC: 107 MMOL/L (ref 94–109)
CHLORIDE BLD-SCNC: 107 MMOL/L (ref 94–109)
CK SERPL-CCNC: 37 U/L (ref 30–300)
CO2 SERPL-SCNC: 25 MMOL/L (ref 20–32)
CO2 SERPL-SCNC: 26 MMOL/L (ref 20–32)
CO2 SERPL-SCNC: 26 MMOL/L (ref 20–32)
CO2 SERPL-SCNC: 27 MMOL/L (ref 20–32)
CREAT SERPL-MCNC: 0.58 MG/DL (ref 0.66–1.25)
CREAT SERPL-MCNC: 0.75 MG/DL (ref 0.66–1.25)
CREAT SERPL-MCNC: 0.78 MG/DL (ref 0.66–1.25)
CREAT SERPL-MCNC: 0.8 MG/DL (ref 0.66–1.25)
CRP SERPL-MCNC: 28 MG/L (ref 0–8)
ERYTHROCYTE [DISTWIDTH] IN BLOOD BY AUTOMATED COUNT: 19 % (ref 10–15)
ERYTHROCYTE [DISTWIDTH] IN BLOOD BY AUTOMATED COUNT: 19.1 % (ref 10–15)
ERYTHROCYTE [SEDIMENTATION RATE] IN BLOOD BY WESTERGREN METHOD: 39 MM/HR (ref 0–20)
GFR SERPL CREATININE-BSD FRML MDRD: >90 ML/MIN/1.73M2
GLUCOSE BLD-MCNC: 103 MG/DL (ref 70–99)
GLUCOSE BLD-MCNC: 156 MG/DL (ref 70–99)
GLUCOSE BLD-MCNC: 76 MG/DL (ref 70–99)
GLUCOSE BLD-MCNC: 85 MG/DL (ref 70–99)
GLUCOSE BLDC GLUCOMTR-MCNC: 153 MG/DL (ref 70–99)
HCO3 BLDV-SCNC: 23 MMOL/L (ref 21–28)
HCO3 BLDV-SCNC: 23 MMOL/L (ref 21–28)
HCO3 BLDV-SCNC: 24 MMOL/L (ref 21–28)
HCO3 BLDV-SCNC: 28 MMOL/L (ref 21–28)
HCT VFR BLD AUTO: 27.3 % (ref 40–53)
HCT VFR BLD AUTO: 27.6 % (ref 40–53)
HGB BLD-MCNC: 8.4 G/DL (ref 13.3–17.7)
HGB BLD-MCNC: 8.5 G/DL (ref 13.3–17.7)
INR PPP: 1.49 (ref 0.85–1.15)
LACTATE SERPL-SCNC: 0.5 MMOL/L (ref 0.7–2)
LACTATE SERPL-SCNC: 0.8 MMOL/L (ref 0.7–2)
LACTATE SERPL-SCNC: 1 MMOL/L (ref 0.7–2)
LACTATE SERPL-SCNC: 1.1 MMOL/L (ref 0.7–2)
MAGNESIUM SERPL-MCNC: 2.1 MG/DL (ref 1.6–2.3)
MCH RBC QN AUTO: 29.3 PG (ref 26.5–33)
MCH RBC QN AUTO: 29.4 PG (ref 26.5–33)
MCHC RBC AUTO-ENTMCNC: 30.8 G/DL (ref 31.5–36.5)
MCHC RBC AUTO-ENTMCNC: 30.8 G/DL (ref 31.5–36.5)
MCV RBC AUTO: 95 FL (ref 78–100)
MCV RBC AUTO: 96 FL (ref 78–100)
O2/TOTAL GAS SETTING VFR VENT: 21 %
OXYHGB MFR BLDV: 50 % (ref 70–75)
OXYHGB MFR BLDV: 51 % (ref 70–75)
OXYHGB MFR BLDV: 53 % (ref 70–75)
OXYHGB MFR BLDV: 59 % (ref 70–75)
PCO2 BLDV: 35 MM HG (ref 40–50)
PCO2 BLDV: 37 MM HG (ref 40–50)
PCO2 BLDV: 37 MM HG (ref 40–50)
PCO2 BLDV: 42 MM HG (ref 40–50)
PH BLDV: 7.41 [PH] (ref 7.32–7.43)
PH BLDV: 7.43 [PH] (ref 7.32–7.43)
PHOSPHATE SERPL-MCNC: 3.7 MG/DL (ref 2.5–4.5)
PLATELET # BLD AUTO: 192 10E3/UL (ref 150–450)
PLATELET # BLD AUTO: 216 10E3/UL (ref 150–450)
PO2 BLDV: 30 MM HG (ref 25–47)
PO2 BLDV: 30 MM HG (ref 25–47)
PO2 BLDV: 31 MM HG (ref 25–47)
PO2 BLDV: 34 MM HG (ref 25–47)
POTASSIUM BLD-SCNC: 3 MMOL/L (ref 3.4–5.3)
POTASSIUM BLD-SCNC: 3.7 MMOL/L (ref 3.4–5.3)
POTASSIUM BLD-SCNC: 3.8 MMOL/L (ref 3.4–5.3)
POTASSIUM BLD-SCNC: 3.8 MMOL/L (ref 3.4–5.3)
POTASSIUM BLD-SCNC: 4 MMOL/L (ref 3.4–5.3)
POTASSIUM BLD-SCNC: 4 MMOL/L (ref 3.4–5.3)
PROT SERPL-MCNC: 5.9 G/DL (ref 6.8–8.8)
PROT SERPL-MCNC: 6 G/DL (ref 6.8–8.8)
PROT SERPL-MCNC: 6.2 G/DL (ref 6.8–8.8)
PROT SERPL-MCNC: 6.2 G/DL (ref 6.8–8.8)
RBC # BLD AUTO: 2.87 10E6/UL (ref 4.4–5.9)
RBC # BLD AUTO: 2.89 10E6/UL (ref 4.4–5.9)
SODIUM SERPL-SCNC: 138 MMOL/L (ref 133–144)
UFH PPP CHRO-ACNC: 0.34 IU/ML
WBC # BLD AUTO: 5.2 10E3/UL (ref 4–11)
WBC # BLD AUTO: 5.2 10E3/UL (ref 4–11)

## 2022-06-19 PROCEDURE — 999N000155 HC STATISTIC RAPCV CVP MONITORING

## 2022-06-19 PROCEDURE — 86235 NUCLEAR ANTIGEN ANTIBODY: CPT | Performed by: INTERNAL MEDICINE

## 2022-06-19 PROCEDURE — 99222 1ST HOSP IP/OBS MODERATE 55: CPT | Mod: GC | Performed by: STUDENT IN AN ORGANIZED HEALTH CARE EDUCATION/TRAINING PROGRAM

## 2022-06-19 PROCEDURE — 71045 X-RAY EXAM CHEST 1 VIEW: CPT | Mod: 26 | Performed by: RADIOLOGY

## 2022-06-19 PROCEDURE — 999N000045 HC STATISTIC DAILY SWAN MONITORING

## 2022-06-19 PROCEDURE — 83605 ASSAY OF LACTIC ACID: CPT

## 2022-06-19 PROCEDURE — 82805 BLOOD GASES W/O2 SATURATION: CPT

## 2022-06-19 PROCEDURE — 71045 X-RAY EXAM CHEST 1 VIEW: CPT | Mod: 26 | Performed by: STUDENT IN AN ORGANIZED HEALTH CARE EDUCATION/TRAINING PROGRAM

## 2022-06-19 PROCEDURE — 82784 ASSAY IGA/IGD/IGG/IGM EACH: CPT | Performed by: INTERNAL MEDICINE

## 2022-06-19 PROCEDURE — 84100 ASSAY OF PHOSPHORUS: CPT | Performed by: INTERNAL MEDICINE

## 2022-06-19 PROCEDURE — 999N000065 XR CHEST PORT 1 VIEW

## 2022-06-19 PROCEDURE — 97165 OT EVAL LOW COMPLEX 30 MIN: CPT | Mod: GO

## 2022-06-19 PROCEDURE — 250N000011 HC RX IP 250 OP 636: Performed by: STUDENT IN AN ORGANIZED HEALTH CARE EDUCATION/TRAINING PROGRAM

## 2022-06-19 PROCEDURE — 86331 IMMUNODIFFUSION OUCHTERLONY: CPT | Performed by: INTERNAL MEDICINE

## 2022-06-19 PROCEDURE — 999N000015 HC STATISTIC ARTERIAL MONITORING DAILY

## 2022-06-19 PROCEDURE — 86036 ANCA SCREEN EACH ANTIBODY: CPT | Performed by: INTERNAL MEDICINE

## 2022-06-19 PROCEDURE — 250N000013 HC RX MED GY IP 250 OP 250 PS 637

## 2022-06-19 PROCEDURE — 86431 RHEUMATOID FACTOR QUANT: CPT | Performed by: INTERNAL MEDICINE

## 2022-06-19 PROCEDURE — 250N000013 HC RX MED GY IP 250 OP 250 PS 637: Performed by: STUDENT IN AN ORGANIZED HEALTH CARE EDUCATION/TRAINING PROGRAM

## 2022-06-19 PROCEDURE — 85027 COMPLETE CBC AUTOMATED: CPT | Performed by: STUDENT IN AN ORGANIZED HEALTH CARE EDUCATION/TRAINING PROGRAM

## 2022-06-19 PROCEDURE — 250N000013 HC RX MED GY IP 250 OP 250 PS 637: Performed by: INTERNAL MEDICINE

## 2022-06-19 PROCEDURE — 85610 PROTHROMBIN TIME: CPT | Performed by: INTERNAL MEDICINE

## 2022-06-19 PROCEDURE — 85027 COMPLETE CBC AUTOMATED: CPT | Performed by: INTERNAL MEDICINE

## 2022-06-19 PROCEDURE — 85520 HEPARIN ASSAY: CPT | Performed by: INTERNAL MEDICINE

## 2022-06-19 PROCEDURE — 85652 RBC SED RATE AUTOMATED: CPT | Performed by: INTERNAL MEDICINE

## 2022-06-19 PROCEDURE — 82550 ASSAY OF CK (CPK): CPT | Performed by: INTERNAL MEDICINE

## 2022-06-19 PROCEDURE — 250N000011 HC RX IP 250 OP 636

## 2022-06-19 PROCEDURE — 86606 ASPERGILLUS ANTIBODY: CPT | Performed by: INTERNAL MEDICINE

## 2022-06-19 PROCEDURE — 97110 THERAPEUTIC EXERCISES: CPT | Mod: GO

## 2022-06-19 PROCEDURE — 999N000157 HC STATISTIC RCP TIME EA 10 MIN

## 2022-06-19 PROCEDURE — 999N000075 HC STATISTIC IABP MONITORING

## 2022-06-19 PROCEDURE — 86200 CCP ANTIBODY: CPT | Performed by: INTERNAL MEDICINE

## 2022-06-19 PROCEDURE — 82104 ALPHA-1-ANTITRYPSIN PHENO: CPT | Performed by: INTERNAL MEDICINE

## 2022-06-19 PROCEDURE — 99291 CRITICAL CARE FIRST HOUR: CPT | Mod: GC | Performed by: INTERNAL MEDICINE

## 2022-06-19 PROCEDURE — 250N000011 HC RX IP 250 OP 636: Performed by: INTERNAL MEDICINE

## 2022-06-19 PROCEDURE — 84450 TRANSFERASE (AST) (SGOT): CPT | Performed by: STUDENT IN AN ORGANIZED HEALTH CARE EDUCATION/TRAINING PROGRAM

## 2022-06-19 PROCEDURE — 200N000002 HC R&B ICU UMMC

## 2022-06-19 PROCEDURE — 86038 ANTINUCLEAR ANTIBODIES: CPT | Performed by: INTERNAL MEDICINE

## 2022-06-19 PROCEDURE — 71045 X-RAY EXAM CHEST 1 VIEW: CPT

## 2022-06-19 PROCEDURE — 83735 ASSAY OF MAGNESIUM: CPT | Performed by: INTERNAL MEDICINE

## 2022-06-19 PROCEDURE — 82085 ASSAY OF ALDOLASE: CPT | Performed by: INTERNAL MEDICINE

## 2022-06-19 PROCEDURE — 86140 C-REACTIVE PROTEIN: CPT | Performed by: INTERNAL MEDICINE

## 2022-06-19 PROCEDURE — 80053 COMPREHEN METABOLIC PANEL: CPT

## 2022-06-19 RX ORDER — POTASSIUM CHLORIDE 750 MG/1
20 TABLET, EXTENDED RELEASE ORAL ONCE
Status: DISCONTINUED | OUTPATIENT
Start: 2022-06-19 | End: 2022-06-20

## 2022-06-19 RX ORDER — LIDOCAINE 4 G/G
2 PATCH TOPICAL
Status: DISCONTINUED | OUTPATIENT
Start: 2022-06-19 | End: 2022-06-29

## 2022-06-19 RX ORDER — ISOSORBIDE DINITRATE 10 MG/1
20 TABLET ORAL
Status: DISCONTINUED | OUTPATIENT
Start: 2022-06-19 | End: 2022-06-24

## 2022-06-19 RX ORDER — POTASSIUM CHLORIDE 750 MG/1
20 TABLET, EXTENDED RELEASE ORAL ONCE
Status: DISCONTINUED | OUTPATIENT
Start: 2022-06-20 | End: 2022-06-19

## 2022-06-19 RX ORDER — FERROUS SULFATE 325(65) MG
325 TABLET ORAL
Status: DISCONTINUED | OUTPATIENT
Start: 2022-06-19 | End: 2022-06-26

## 2022-06-19 RX ORDER — CEFAZOLIN SODIUM 2 G/100ML
2 INJECTION, SOLUTION INTRAVENOUS
Status: DISCONTINUED | OUTPATIENT
Start: 2022-06-19 | End: 2022-06-22

## 2022-06-19 RX ORDER — POTASSIUM CHLORIDE 29.8 MG/ML
20 INJECTION INTRAVENOUS
Status: COMPLETED | OUTPATIENT
Start: 2022-06-19 | End: 2022-06-19

## 2022-06-19 RX ORDER — ZOLPIDEM TARTRATE 5 MG/1
5 TABLET ORAL
Status: DISCONTINUED | OUTPATIENT
Start: 2022-06-19 | End: 2022-06-19

## 2022-06-19 RX ORDER — HYDRALAZINE HYDROCHLORIDE 50 MG/1
50 TABLET, FILM COATED ORAL EVERY 8 HOURS SCHEDULED
Status: DISCONTINUED | OUTPATIENT
Start: 2022-06-19 | End: 2022-06-24

## 2022-06-19 RX ORDER — POTASSIUM CHLORIDE 29.8 MG/ML
20 INJECTION INTRAVENOUS ONCE
Status: COMPLETED | OUTPATIENT
Start: 2022-06-19 | End: 2022-06-19

## 2022-06-19 RX ORDER — POTASSIUM CHLORIDE 1.5 G/1.58G
20 POWDER, FOR SOLUTION ORAL ONCE
Status: COMPLETED | OUTPATIENT
Start: 2022-06-20 | End: 2022-06-19

## 2022-06-19 RX ORDER — POTASSIUM CHLORIDE 1.5 G/1.58G
20 POWDER, FOR SOLUTION ORAL ONCE
Status: COMPLETED | OUTPATIENT
Start: 2022-06-19 | End: 2022-06-19

## 2022-06-19 RX ORDER — TRAZODONE HYDROCHLORIDE 50 MG/1
50 TABLET, FILM COATED ORAL AT BEDTIME
Status: DISCONTINUED | OUTPATIENT
Start: 2022-06-19 | End: 2022-06-20

## 2022-06-19 RX ADMIN — Medication 325 MG: at 11:36

## 2022-06-19 RX ADMIN — Medication 10 MG: at 21:49

## 2022-06-19 RX ADMIN — HYDROXYCHLOROQUINE SULFATE 200 MG: 200 TABLET, FILM COATED ORAL at 19:56

## 2022-06-19 RX ADMIN — POTASSIUM CHLORIDE 20 MEQ: 29.8 INJECTION, SOLUTION INTRAVENOUS at 01:32

## 2022-06-19 RX ADMIN — HEPARIN SODIUM 1100 UNITS/HR: 10000 INJECTION, SOLUTION INTRAVENOUS at 15:42

## 2022-06-19 RX ADMIN — POTASSIUM CHLORIDE 20 MEQ: 1.5 POWDER, FOR SOLUTION ORAL at 15:10

## 2022-06-19 RX ADMIN — HYDROXYCHLOROQUINE SULFATE 200 MG: 200 TABLET, FILM COATED ORAL at 08:08

## 2022-06-19 RX ADMIN — ISOSORBIDE DINITRATE 20 MG: 10 TABLET ORAL at 18:05

## 2022-06-19 RX ADMIN — TRAZODONE HYDROCHLORIDE 50 MG: 50 TABLET ORAL at 23:49

## 2022-06-19 RX ADMIN — FUROSEMIDE 80 MG: 10 INJECTION, SOLUTION INTRAVENOUS at 08:07

## 2022-06-19 RX ADMIN — DULOXETINE 30 MG: 30 CAPSULE, DELAYED RELEASE ORAL at 08:08

## 2022-06-19 RX ADMIN — HYDRALAZINE HYDROCHLORIDE 25 MG: 25 TABLET, FILM COATED ORAL at 06:06

## 2022-06-19 RX ADMIN — POTASSIUM CHLORIDE 20 MEQ: 1.5 POWDER, FOR SOLUTION ORAL at 11:35

## 2022-06-19 RX ADMIN — DOBUTAMINE HYDROCHLORIDE 7.5 MCG/KG/MIN: 200 INJECTION INTRAVENOUS at 18:16

## 2022-06-19 RX ADMIN — POTASSIUM CHLORIDE 20 MEQ: 29.8 INJECTION, SOLUTION INTRAVENOUS at 08:09

## 2022-06-19 RX ADMIN — POTASSIUM CHLORIDE 20 MEQ: 1.5 POWDER, FOR SOLUTION ORAL at 08:08

## 2022-06-19 RX ADMIN — ISOSORBIDE DINITRATE 20 MG: 10 TABLET ORAL at 08:08

## 2022-06-19 RX ADMIN — TAMSULOSIN HYDROCHLORIDE 0.4 MG: 0.4 CAPSULE ORAL at 08:08

## 2022-06-19 RX ADMIN — POTASSIUM CHLORIDE 20 MEQ: 29.8 INJECTION, SOLUTION INTRAVENOUS at 00:41

## 2022-06-19 RX ADMIN — POTASSIUM CHLORIDE 20 MEQ: 1.5 POWDER, FOR SOLUTION ORAL at 23:58

## 2022-06-19 RX ADMIN — ACETAMINOPHEN 325 MG: 325 TABLET, FILM COATED ORAL at 15:10

## 2022-06-19 RX ADMIN — ACETAMINOPHEN 325 MG: 325 TABLET, FILM COATED ORAL at 01:29

## 2022-06-19 RX ADMIN — DOBUTAMINE HYDROCHLORIDE 7.5 MCG/KG/MIN: 200 INJECTION INTRAVENOUS at 00:58

## 2022-06-19 RX ADMIN — POTASSIUM CHLORIDE 20 MEQ: 29.8 INJECTION, SOLUTION INTRAVENOUS at 02:17

## 2022-06-19 RX ADMIN — LIDOCAINE PATCH 4% 2 PATCH: 40 PATCH TOPICAL at 21:49

## 2022-06-19 RX ADMIN — HYDRALAZINE HYDROCHLORIDE 50 MG: 50 TABLET, FILM COATED ORAL at 15:10

## 2022-06-19 RX ADMIN — ISOSORBIDE DINITRATE 20 MG: 10 TABLET ORAL at 11:35

## 2022-06-19 RX ADMIN — ASPIRIN 81 MG CHEWABLE TABLET 81 MG: 81 TABLET CHEWABLE at 08:08

## 2022-06-19 RX ADMIN — HYDRALAZINE HYDROCHLORIDE 50 MG: 50 TABLET, FILM COATED ORAL at 21:49

## 2022-06-19 RX ADMIN — ACETAMINOPHEN 325 MG: 325 TABLET, FILM COATED ORAL at 19:56

## 2022-06-19 ASSESSMENT — ACTIVITIES OF DAILY LIVING (ADL)
ADLS_ACUITY_SCORE: 30
ADLS_ACUITY_SCORE: 29
ADLS_ACUITY_SCORE: 29
ADLS_ACUITY_SCORE: 30
ADLS_ACUITY_SCORE: 29
ADLS_ACUITY_SCORE: 30
ADLS_ACUITY_SCORE: 29
PREVIOUS_RESPONSIBILITIES: MEAL PREP;HOUSEKEEPING;LAUNDRY;SHOPPING;YARDWORK;MEDICATION MANAGEMENT;FINANCES;DRIVING
ADLS_ACUITY_SCORE: 30
ADLS_ACUITY_SCORE: 29
ADLS_ACUITY_SCORE: 29

## 2022-06-19 NOTE — CONSULTS
PULMONARY CONSULT  Date of service: 2022    Patient: Dandy Sands      : 1960      MRN: [id number]    We were consulted by Dr. Galan for evaluation of moderate emphysema on imaging studies in the context of candidacy for heart transplant.        Impressions/Recommendations:   61 year old male with PMHx most significant for tobacco dependence in remission, coronary artery disease, ischemic cardiomyopathy with heart failure reduced EF, cardiogenic shock, antiphospholipid syndrome with DVT on chronic anticoagulation, lupus, on Plaquenil and mycophenolate.  Pulmonary service was consulted to comment on his CT scan finding showing emphysema in the context of candidacy for heart transplant.  We reviewed the imaging studies from May 2022, no prior imaging studies from before in the PACS, the imaging studies showed mild to moderate emphysematous changes in the apices, interestingly the patient has some reticular and fibrotic changes in the subpleural fields.  Combined with emphysema this could be CPFE, given his history of for autoimmune disease with a lupus and antiphospholipid syndrome this could be also connective tissue disease related ILD.  No imaging studies to compare from before, but this could be also related to his recent COVID infection in 2022.  No prior full PFT, but his bedside spirometry is concerning with significant decrease of his FEV1 down to 34% predicted.      1.  paraseptal emphysema  2.  Chronic subpleural reticular and fibrotic changes   3. history of tobacco dependence  4.  Ischemic cardiomyopathy secondary to CAD  5.  Lupus  6.  Antiphospholipid syndrome on chronic anticoagulation for DVT.   -Would recommend full PFT with DLCO when able   -Would recommend alpha 1 antitrypsin level (ordered for you)   -We recommend ILD panel including autoimmune work-up (ordered for you)   -If the patient is symptomatic and his symptoms are felt to be more likely related to pulmonary findings,  might consider starting the patient on inhaler therapy with LABA/LAMA combination.   -He will need a pulmonary follow-up as an outpatient with serial PFT high-resolution CT scan with inspiration and expiration views.         Patient seen & discussed w/  Dr. Virginia M.D., who is in agreement.     Janak Le MD  Pulmonary & Critical Care Fellow  [phone]          History of Present Illness:   Dandy Sands is a 61 year old male who was transferred to [facility] from [facility] in [facility] on 6/17/2022 for advanced heart failure treatment evaluation.  Patient has a history of CAD SP remote PCI to the LAD, ischemic cardiomyopathy with EF 30% SP CRT-D, severe MR, antiphospholipid on chronic anticoagulation for history of DVT/PE, history of COVID-19 in January 2022 did not require intubation, hypertension and hyperlipidemia.  Patient was transferred from [place] to manage cardiogenic shock.  CT scan of the chest showed emphysematous changes, so pulmonary was consulted in the context of heart transplant evaluation.  Reviewing the chart and care everywhere, I do not see previous PFTs.    Patient reports that he used to smoke 1 to 2 packs a day for 25 years, he stopped 15 years ago after his heart attack, the patient denied significant shortness of breath before he stopped smoking, but he said that he noted significant improvement in his exercise tolerance after he stopped smoking 15 years ago.  Patient denied recurrent bouts of bronchitis or pneumonia in the past.  He denied known lung disease in the past except for his recent COVID infection in January 2022, at that time he was admitted to the hospital but did not require intubation and mechanical ventilation.  The patient is not on any inhaler therapy as an outpatient and he has never been on inhaler therapy.  He has never been told that he has COPD or emphysema.              Review of Symptoms:   10-point ROS reviewed, & found negative w/  exceptions noted in the HPI.          Past Medical History:     No past medical history on file.    Past Surgical History:   Procedure Laterality Date     COLONOSCOPY N/A 05/23/2022    Procedure: COLONOSCOPY;  Surgeon: Parth Meneses MD;  Location: [facility] OR     CV CORONARY ANGIOGRAM N/A 5/24/2022    Procedure: Coronary Angiogram;  Surgeon: Mike Pope MD;  Location: [facility] HEART CARDIAC CATH LAB     CV CORONARY ANGIOGRAM N/A 5/29/2022    Procedure: Coronary Angiogram;  Surgeon: Austin Bright MD;  Location: [facility] HE[facility]ARD[facility]     CV CORONARY LITHOTRIPSY PCI Bilateral 5/24/2022    Procedure: Percutaneous Coronary Intervention - Lithotripsy;  Surgeon: Mike Pope MD;  Location:  HEART CARDIAC CATH LAB     CV INTRAVASULAR ULTRASOUND N/A 5/24/2022    Procedure: Intravascular Ultrasound;  Surgeon: Mike Pope MD;  [facility]     CV PCI ANGIOPLASTY N/A 5/29/2022    Procedure: Percutaneous Transluminal Angioplasty;  Surgeon: Austin Bright MD;  [facility]     CV PCI STENT DRUG ELUTING N/A 5/24/2022    Procedure: Percutaneous Coronary Intervention Stent;  Surgeon: Mike Pope MD;  [facility]     CV RIGHT HEART CATH MEASUREMENTS RECORDED N/A 05/18/2022    Procedure: Right Heart Catheterization;  Surgeon: Justo Stewart MD;  [facility]     CV RIGHT HEART CATH MEASUREMENTS RECORDED N/A 5/24/2022    Procedure: Right Heart Catheterization;  Surgeon: Mike Pope MD;  Location:  HEART CARDIAC CATH LAB     CV RIGHT HEART CATH MEASUREMENTS RECORDED N/A 5/29/2022    Procedure: Right Heart Catheterization;  Surgeon: Austin Bright MD;  Location:  HEART CARDIAC CATH LAB     CV RIGHT HEART EXERCISE STRESS STUDY N/A 05/18/2022    Procedure: Stress Drug Study;  Surgeon: Justo Stewart MD;  Location:  HEART CARDIAC CATH LAB     PICC DOUBLE LUMEN PLACEMENT Right 05/28/2022    right  basilic 5 fr dl picc 40 cm            Allergies:     No Known Allergies          Outpatient Medications:     No current facility-administered medications on file prior to encounter.  atorvastatin (LIPITOR) 40 MG tablet, Take 40 mg by mouth daily  clopidogrel (PLAVIX) 75 MG tablet, Take 1 tablet (75 mg) by mouth daily  DULoxetine (CYMBALTA) 30 MG capsule, Take 1 capsule (30 mg) by mouth daily  famotidine (PEPCID) 20 MG tablet, Take 20 mg by mouth 2 times daily  ferrous sulfate (FE TABS) 325 (65 Fe) MG EC tablet, Take 325 mg by mouth daily (with lunch)  furosemide (LASIX) 40 MG tablet, Take 1 tablet (40 mg) by mouth daily  hydroxychloroquine (PLAQUENIL) 200 MG tablet, Take 200 mg by mouth 2 times daily  hydrOXYzine (ATARAX) 25 MG tablet, Take 1 tablet (25 mg) by mouth every 6 hours as needed for anxiety (ANXIETY)  LORazepam (ATIVAN) 0.5 MG tablet, Take 0.5-1 mg by mouth every 4 hours as needed for anxiety  melatonin 3 MG tablet, Take 6 mg by mouth nightly as needed for sleep  mycophenolate (GENERIC EQUIVALENT) 500 MG tablet, Take 1,500 mg by mouth 2 times daily  potassium chloride ER (KLOR-CON M) 20 MEQ CR tablet, Take 1 tablet (20 mEq) by mouth 2 times daily  promethazine (PHENERGAN) 12.5 MG tablet, Take 1 tablet (12.5 mg) by mouth every 6 hours as needed for nausea or vomiting  tamsulosin (FLOMAX) 0.4 MG capsule, Take 0.4 mg by mouth daily  thiamine (B-1) 100 MG tablet, Take 1 tablet (100 mg) by mouth daily  warfarin ANTICOAGULANT (COUMADIN) 5 MG tablet, Take 1 tablet (5 mg) by mouth daily Get an INR on Friday 5/27 and then follow instructions by your coumadin clinic  zolpidem (AMBIEN) 5 MG tablet, Take 5 mg by mouth nightly as needed for sleep  [DISCONTINUED] lisinopril (ZESTRIL) 2.5 MG tablet, Take 1 tablet (2.5 mg) by mouth 2 times daily  [DISCONTINUED] ticagrelor (BRILINTA) 90 MG tablet, Take 1 tablet (90 mg) by mouth 2 times daily              Family History:   No family history for emphysema or COPD           "Social History:   25-40-pack-year smoking history, quit 15 years ago.  Denied vaping.  No excessive alcohol consumption.  No drugs.  Denied occupational exposure.          Physical Exam:   /85   Pulse 72   Temp 97.3  F (36.3  C) (Tympanic)   Resp 19   Ht 1.702 m (5' 7\")   Wt 68.5 kg (151 lb 0.2 oz)   SpO2 94%   BMI 23.65 kg/m      General: NAD  HEENT: Anicteric sclera, EOMI, OP unobstructed, w/o erythema/discharge  CV: RRR, no m/r/g  Lungs: Clear to auscultation bilaterally, no rales, rhonchi or wheezing.  Abd: Soft, NT, ND  Ext: No LE edema  Skin: No rashes, cyanosis, or jaundice  Neuro: AAOx3, no focal deficits          Data:   Labs (all laboratory studies reviewed by me)    Imaging (all imaging studies reviewed by me):  CT CHEST ABDOMEN PELVIS W/O CONTRAST, 5/19/2022   IMPRESSION:    No acute or suspicious findings in exam. Pulmonary artery enlargement  suggesting pulmonary artery hypertension. Chronic interstitial  subpleural fibrotic disease with paraseptal emphysematous change.    Bedside spirometry  Vital capacity 1500  FEV1 1.41L, predicted 4 L (34% predicted)    Procedures:   None         "

## 2022-06-19 NOTE — PROGRESS NOTES
"Cardiology Progress Note       Changes Today:   PRA back with 69%, therefore will proceed with LVAD instead of transplantation.      Physical Exam   Temp: 97.3  F (36.3  C) Temp src: Tympanic BP: 128/85 Pulse: 72   Resp: 19 SpO2: 94 %   Oxygen Delivery: 2 LPM    Vital Signs with Ranges  Temp:  [96.3  F (35.7  C)-97.9  F (36.6  C)] 97.3  F (36.3  C)  Pulse:  [69-83] 72  Resp:  [11-32] 19  BP: (101-149)/(55-91) 128/85  SpO2:  [92 %-100 %] 94 %    Intake/Output    Intake/Output Summary (Last 24 hours) at 6/19/2022 0722  Last data filed at 6/19/2022 0700  Gross per 24 hour   Intake 2266.8 ml   Output 4475 ml   Net -2208.2 ml       Weight  Vitals:    06/17/22 1736 06/18/22 0400   Weight: 67.4 kg (148 lb 9.4 oz) 68.5 kg (151 lb 0.2 oz)       Resp: 19        DOBUTamine 7.5 mcg/kg/min (06/19/22 0800)     heparin 1,100 Units/hr (06/19/22 0800)     nitroPRUsside           aspirin  81 mg Oral Daily     DULoxetine  30 mg Oral Daily     ferrous sulfate  325 mg Oral Daily with lunch     furosemide  80 mg Intravenous BID     hydrALAZINE  50 mg Oral Q8H CAMMY     hydroxychloroquine  200 mg Oral BID     isosorbide dinitrate  20 mg Oral TID     potassium chloride  20 mEq Oral BID     potassium chloride  20 mEq Oral Once     tamsulosin  0.4 mg Oral Daily     ticagrelor  90 mg Oral BID        , Blood pressure 128/85, pulse 72, temperature 97.3  F (36.3  C), temperature source Tympanic, resp. rate 19, height 1.702 m (5' 7\"), weight 68.5 kg (151 lb 0.2 oz), SpO2 94 %.  GEN:  Alert, oriented x 3, appears comfortable, NAD.  CV:  Regular rate and rhythm   LUNGS:  Clear to auscultation bilaterally   ABD:  Active bowel sounds, soft, non-tender/non-distended.  No rebound/guarding/rigidity.  EXT:  No edema or cyanosis.      Data   Recent Labs   Lab Test 06/19/22  0607 06/19/22  0357 06/18/22  2354 06/18/22 2129 06/18/22 2125 06/18/22  1617   NA  --  138  --   --   --  136   POTASSIUM 3.8 4.0   < >  --    < > 3.2*   CHLORIDE  --  104  --   --   " --  104   CO2  --  26  --   --   --  26   ANIONGAP  --  8  --   --   --  6   GLC  --  156*  --  153*  --  96   BUN  --  11  --   --   --  16   CR  --  0.58*  --   --   --  0.87   ELDA  --  8.0*  --   --   --  8.0*    < > = values in this interval not displayed.       Lab Results   Component Value Date    WBC 5.2 06/19/2022     Lab Results   Component Value Date    RBC 2.87 06/19/2022     Lab Results   Component Value Date    HGB 8.4 06/19/2022     Lab Results   Component Value Date    HCT 27.3 06/19/2022     No components found for: MCT  Lab Results   Component Value Date    MCV 95 06/19/2022     Lab Results   Component Value Date    MCH 29.3 06/19/2022     Lab Results   Component Value Date    MCHC 30.8 06/19/2022     Lab Results   Component Value Date    RDW 19.1 06/19/2022     Lab Results   Component Value Date     06/19/2022        Liver Function Studies -   Recent Labs   Lab Test 06/19/22 0357   PROTTOTAL 6.2*   ALBUMIN 2.9*   BILITOTAL 1.4*   ALKPHOS 102   *   ALT 1,342*       Venous Blood Gas  Recent Labs   Lab 06/19/22  0822 06/19/22  0357 06/18/22 2125 06/18/22  1415   PHV 7.43 7.43 7.42 7.38   PCO2V 37* 42 42 44   PO2V 30 30 30 28   HCO3V 24 28 27 26   RINA -0.1 2.9* 2.6* 0.6   O2PER 21 21 21 2       Arterial Blood Gas  Recent Labs   Lab 06/19/22  0822 06/19/22  0357 06/18/22 2125 06/18/22  1415   O2PER 21 21 21 2          Recent Results (from the past 24 hour(s))   XR Chest Port 1 View    Narrative    EXAM: XR Chest 1 view 6/19/2022 12:56 AM      HISTORY: dyspnea; device placement monitoring.    COMPARISON: 6/18/2022.     TECHNIQUE: Frontal view of the chest.    FINDINGS: Right IJ [place]-Janae catheter with tip in the main pulmonary  artery. Left chest wall ICD with leads in stable position.  Intra-aortic balloon pump in similar position at the distalmost aortic  arch. Right-sided PICC with tip in the low SVC.  Trachea is midline. Cardiac and mediastinal silhouette is stable.  Similar hazy  pulmonary opacities. No pleural effusions. No  pneumothoraces.      Impression    IMPRESSION:   1. Similar hazy pulmonary opacities.  2. Right IJ [place]-Janae catheter with tip in the main pulmonary artery  3. Intra-aortic balloon pump in stable position at the distalmost  aortic arch.    I have personally reviewed the examination and initial interpretation  and I agree with the findings.    CONSTANTINO OWEN MD         SYSTEM ID:  [id number]       Blood culture:  No results found for this or any previous visit.   Urine culture:  Results for orders placed or performed during the hospital encounter of 05/16/22   Urine Culture Aerobic Bacterial    Specimen: Urine, Midstream   Result Value Ref Range    Culture No Growth        Assessment & Plan    61-year-old man with history of ischemic cardiomyopathy LVEF 15%) NYHA class IV symptoms ACC stage D presenting with multi-organ failure (JOSE; Acute liver injury/shock liver) after multiple admissions for heart failure over the past several months for evaluation of advanced therapies.     CV:   #Ambulatory cardiogenic shock SCAI D  #Acute on chronic systolic heart failure  #Advanced ischemic cardiomyopathy, Class IV, Stage D  # SVT  # Nonsustained VT  # CAD s/p PCI to LAD (2005)  # Hx of MI (2007)  # Severe ostial LAD disease with in-stent restenosis of mid LAD at diag bifurcation, severe proximal RCA disease, mild circ disease now s/p ostial LAD and proximal RCA lithotripsy and stenting on 5/24/22  # S/p CRT-D ([employer] 2006, gen change 2012)  CMRI showing infarcted myocardium in LAD territory. Given the degree of LV dysfunction/dilation, moderate to severe functional MR and lack of viable myocardium, cardiac surgery would be high risk. Now s/p ostial LAD and proximal RCA lithotripsy and stenting on 5/24/22.   - ASA and Coumadin (on hold) continue heparin gtt   - ASA 81 mg until 6/22 given stenting on 5/22      Plan:  --Inotropes: Dobutamine 7.5  --Hypervolemic.diurese using  trip as guide goal CVP 8-10  --Continue iABP  --hydral 50 mg Q8H  --isordil 20mg TID   --Anticoagulation: hep gtt once iABP in place  --Antiplatelet: Continue ASA-81mg  -- Statin: hold statin   -- BMP BID, K>4 & Mg>2  -- Strict I/Os  -- Daily weight    Pulmonary  # Mild-Moderate Emphysema  # Hx of COVID-19  Appreciate pulmonary consultation given emphysema  PFTs  Former smoker 2 ppd for 40 years quit on 09/09/09.     Renal:  -Strict I&O while diuresing   -Monitor Electrolytes while actively diuresing K->4 Mg->2    Gastroenterology:  # Severe malnutrition in the context of chronic illness  # Liver injury, in the setting of cardiogenic shock  # GERD  - Diuresis as above  -Supplements to help maintain nutrition.        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  # Insomnia  - PTA lorazepam 0.5-1 mg q6h PRN (pta q4h prn)  - PTA zolpidem 5mg at bedtime prn  - Hydroxyzine prn for anxiety  - Melatonin scheduled   - cymbalta 30mg; continue anxiety PRNs while in the hospital     Endocrine: TSH WNL HBA1C 5.5   -Continue to monitor FS while in ICU, Insulin GTT as needed    Hemathology:  # Hx of DVT and PE  #APL  # Anemia, chronic  - Continue pta ferrous sulfate 325mg daily        ID:  [id number]    Rheumatology  #Lupus  # APL  - Continue pta hydroxychloroquine 200mg bid   - PTA mycophenolic acid 1500mg q12h on hold in anticipation for LVAD  -- Would plan to hold MMF one week prior to surgery and re-starting 5-7 days after surgery in the absence of surgical site infection, poor wound healing or infection elsewhere.      - Continue pta tamsulosin 0.4mg daily    Diet: Low salt diet <2 g/24 hours  DVT Prophylaxis: Heparin GTT  Fitzgerald Catheter: In place  Code Status: Full   Lines: PICC Line    LVAD/Transplant Evaluation Checklist  [x]?????? labs (CBC, CMP, PT/INR, cystatin C, prealbumin, UA + micro)  [x]?????? Infectious (Hep A/B/C, HIV, treponema, HSV 1/2 IgG, CMV IgG, Toxo IgG, EBV IgG, varicella IgG, Quant gold, COVID  vaccine/PCR)  [x]?????? Utox/nicotine and cotinine/PeTH   [x]?????? Immunocompatibility (last transfusion, ABO, HLA tissue typing, PRA)  [x]?????? ECG, Echo  [x]?????? CPX - can be oupatient  [x]?????? 6MWT - can be outpatient  [x]?????? RHC, completed but needs repeat in 2-3 months to assess PVR  [x]?????? CVTS consult  [x]?????? Social work consult  [x]??????  Palliative care consult: final note pending  [x]?????? Neuropsych consult: order placed: final note pending  [x]?????? Nutrition consult  [x]?????? CT Dental   [x]?????? Dental consult  [x]?????? Abd US + doppler  - Abd US complete done, not with doppler; OK given unremarkable CT CAP  [x]?????? Extremity US and ABIs  [x]?????? Carotid US (if DM or ICM or >51yo)  [x]?????? PFTs: outpatient  [x]?????? Dexa: outpatient  [x]?????? CT head non-contrast  [x]?????? CT CAP non-contrast  [x]?????? Colonoscopy (>49 yo)  [x]?????? PSA/HCG      This patient was seen and discussed with Dr. Geno MD who agrees with the above assessment and plan.     Agapito Alexander MD  Cardiology Fellow  [phone]

## 2022-06-19 NOTE — PROGRESS NOTES
IABP audibly loud at start of shift, assessed by ECMO specialist, no observable leak with good waveform and pressure, continue to monitor    LITA Dow

## 2022-06-19 NOTE — PROGRESS NOTES
06/19/22 1400   Quick Adds   Type of Visit Initial Occupational Therapy Evaluation   Living Environment   People in Home alone   Current Living Arrangements house   Home Accessibility stairs to enter home   Number of Stairs, Main Entrance 3   Stair Railings, Main Entrance none   Transportation Anticipated family or friend will provide   Living Environment Comments Pt lives mar[place] single story home w/ 3 THANG. Pt has a tub shower.   Self-Care   Usual Activity Tolerance moderate   Current Activity Tolerance good   Regular Exercise Yes   Activity/Exercise Type biking   Fall history within last six months yes   Number of times patient has fallen within last six months 2  (per chart)   Activity/Exercise/Self-Care Comment Pt reports IND at baseline with ADls and mobility. Pt has one cat he cares for   Instrumental Activities of Daily Living (IADL)   Previous Responsibilities meal prep;housekeeping;laundry;shopping;yardwork;medication management;finances;driving   General Information   Onset of Illness/Injury or Date of Surgery 06/19/22   Referring Physician Genny Frye   Patient/Family Therapy Goal Statement (OT) To return to PLOF   Additional Occupational Profile Info/Pertinent History of Current Problem 61-year-old man with history of ischemic cardiomyopathy LVEF 15%) NYHA class IV symptoms ACC stage D presenting with multi-organ failure (JOSE; Acute liver injury/shock liver) after multiple admissions for heart failure over the past several months for evaluation of advanced therapies.   Existing Precautions/Restrictions cardiac   General Observations and Info Ax limited d/t R femoral IABP   Cognitive Status Examination   Orientation Status orientation to person, place and time   Cognitive Status Comments Cognition appears intact currently. Will monitor throughoutpra hospital stay   Visual Perception   Visual Impairment/Limitations WFL   Sensory   Sensory Quick Adds No deficits were identified   Pain Assessment    Patient Currently in Pain No   Integumentary/Edema   Integumentary/Edema no deficits were identifed   Range of Motion Comprehensive   General Range of Motion bilateral upper extremity ROM WNL   Strength Comprehensive (MMT)   Comment, General Manual Muscle Testing (MMT) Assessment BUE grossly +4/5   Coordination   Upper Extremity Coordination No deficits were identified   Bed Mobility   Supine-Sit [place] (Bed Mobility) unable to assess  (Femoral IABP)   Transfers   Transfer Comments Unable ot assess d/t femoral IABP   Activities of Daily Living   BADL Assessment/Intervention bathing;lower body dressing;upper body dressing;grooming;toileting   Bathing Assessment/Intervention   Comment, (Bathing) Anticipate dep currently d/t IABP   Upper Body Dressing Assessment/Training   Comment, (Upper Body Dressing) Anticipate dep currently d/t IABP   Lower Body Dressing Assessment/Training   Comment, (Lower Body Dressing) dep currently d/t IABP   Grooming Assessment/Training   [place] Level (Grooming) set up   Toileting   Comment, (Toileting) Anticipate dep currently d/t IABP   Clinical Impression   Criteria for Skilled Therapeutic Interventions Met (OT) Yes, treatment indicated   OT Diagnosis Decreased functional mobility and I/ADL IND   OT Problem List-Impairments impacting ADL problems related to;activity tolerance impaired;strength;other (see comments)  (bedbound d/t femoral IABP)   Assessment of Occupational Performance 3-5 Performance Deficits   Identified Performance Deficits dresisng, bathing, toileting, amb, transfers   Planned Therapy Interventions (OT) strengthening;progressive activity/exercise;home program guidelines   Clinical Decision Making Complexity (OT) low complexity   Anticipated Equipment Needs Upon Discharge (OT)   (tbd)   Risk & Benefits of therapy have been explained evaluation/treatment results reviewed;risks/benefits reviewed;care plan/treatment goals reviewed;current/potential barriers  reviewed;participants included;participants voiced agreement with care plan;patient;daughter   Clinical Impression Comments Pt would benefit from OT/CR to A pt w/ maintaining I/ADL IND jamila when in bed d/t femoral IABP.   OT Discharge Planning   OT Discharge Recommendation (DC Rec) home with assist;home with outpatient cardiac rehab   OT Rationale for DC Rec Currently anticipate home w/ A although will continue to assess and reeval pending LVAD vs heart txp   Total Evaluation Time (Minutes)   Total Evaluation Time (Minutes) 3   OT Goals   Therapy Frequency (OT) 3 times/wk   OT Predicted Duration/Target Date for Goal Attainment 06/30/22   OT Goals OT Goal 1;Aerobic Activity   OT: Goal 1 To will be able to IND perform BUE and hand HEP to maintain stregnth during IP stay

## 2022-06-19 NOTE — PLAN OF CARE
ICU End of Shift Summary. See flowsheets for vital signs and detailed assessment.    Changes this shift:     Cardiac: drips unchanged, on dobutamine at 7.5, heparin 1100. Potassium replaced x 2 doses, 20meq each. [place] repositioned x 3 followed by CXR each time. AZUL numbers trended, see flow sheets. Consent obtained for subclavian IABP placement tomorrow. IABP is  audibly loud. Monitored helium volume and assessed by ECMO specialist. No obvious leaks found and with good waveform, correlating pressure and without alarms so OK to monitor.     Respiratory: PFT done bedside by RT, pulm team consulted for new COPD/emphysema diagnosis.     : Lasix 80mg this AM, voiding via urinal. 1600 dose held for CVP of 6 (goal 8-10)    Plan:  AM huddle to address VAD vs subclavian IABP. NPO at midnight with heparin off at 0800    Goal Outcome Evaluation:    Plan of Care Reviewed With: patient, daughter

## 2022-06-19 NOTE — PROVIDER NOTIFICATION
06/19/22 1000   Quick Adds   Quick Adds NIF/VC;Incentive Spirometry;Peak Flow   Vital Signs   Resp 16   Pulse 70   /79   Oxygen Therapy   Device (Oxygen Therapy) none (room air)   Oxygen Therapy   SpO2 98 %   Vital Capacity (VC)   Vital Capacity (mL) (S)  1500  (FEV1: ACTUAL 1.41  PRE: 4.0; PEF: ACTUAL 2.44  PRE 9.1)   Patient Position (VC) HOB elevated   Effort (VC) poor   IABP   Assisted Systolic 79   Assisted Diastolic 53   Pump Mean 81   Augmented Diastolic 112   Ratio 1:1   Trigger EKG   IABP Heart Rate 71   Augmentation % 100 %   Helium Tubing no blood (brown flecks) present in tubing

## 2022-06-20 ENCOUNTER — PREP FOR PROCEDURE (OUTPATIENT)
Dept: CARDIOLOGY | Facility: CLINIC | Age: 62
End: 2022-06-20

## 2022-06-20 ENCOUNTER — COMMITTEE REVIEW (OUTPATIENT)
Dept: TRANSPLANT | Facility: CLINIC | Age: 62
End: 2022-06-20

## 2022-06-20 ENCOUNTER — ANESTHESIA (OUTPATIENT)
Dept: SURGERY | Facility: CLINIC | Age: 62
DRG: 001 | End: 2022-06-20
Payer: COMMERCIAL

## 2022-06-20 ENCOUNTER — CARE COORDINATION (OUTPATIENT)
Dept: CARDIOLOGY | Facility: CLINIC | Age: 62
End: 2022-06-20

## 2022-06-20 ENCOUNTER — APPOINTMENT (OUTPATIENT)
Dept: GENERAL RADIOLOGY | Facility: CLINIC | Age: 62
DRG: 001 | End: 2022-06-20
Attending: INTERNAL MEDICINE
Payer: COMMERCIAL

## 2022-06-20 DIAGNOSIS — R57.0 CARDIOGENIC SHOCK (H): Primary | ICD-10-CM

## 2022-06-20 LAB
ALBUMIN SERPL-MCNC: 2.8 G/DL (ref 3.4–5)
ALBUMIN SERPL-MCNC: 2.8 G/DL (ref 3.4–5)
ALBUMIN SERPL-MCNC: 3 G/DL (ref 3.4–5)
ALBUMIN SERPL-MCNC: 3 G/DL (ref 3.4–5)
ALP SERPL-CCNC: 104 U/L (ref 40–150)
ALP SERPL-CCNC: 112 U/L (ref 40–150)
ALP SERPL-CCNC: 98 U/L (ref 40–150)
ALP SERPL-CCNC: 98 U/L (ref 40–150)
ALT SERPL W P-5'-P-CCNC: 749 U/L (ref 0–70)
ALT SERPL W P-5'-P-CCNC: 864 U/L (ref 0–70)
ALT SERPL W P-5'-P-CCNC: 944 U/L (ref 0–70)
ALT SERPL W P-5'-P-CCNC: 978 U/L (ref 0–70)
ANA PAT SER IF-IMP: ABNORMAL
ANA SER QL IF: POSITIVE
ANA TITR SER IF: ABNORMAL {TITER}
ANCA AB PATTERN SER IF-IMP: NORMAL
ANION GAP SERPL CALCULATED.3IONS-SCNC: 4 MMOL/L (ref 3–14)
ANION GAP SERPL CALCULATED.3IONS-SCNC: 7 MMOL/L (ref 3–14)
AST SERPL W P-5'-P-CCNC: 138 U/L (ref 0–45)
AST SERPL W P-5'-P-CCNC: 168 U/L (ref 0–45)
AST SERPL W P-5'-P-CCNC: 218 U/L (ref 0–45)
AST SERPL W P-5'-P-CCNC: 238 U/L (ref 0–45)
ATRIAL RATE - MUSE: 106 BPM
ATRIAL RATE - MUSE: 75 BPM
ATRIAL RATE - MUSE: 76 BPM
BASE EXCESS BLDV CALC-SCNC: -0.2 MMOL/L (ref -7.7–1.9)
BASE EXCESS BLDV CALC-SCNC: -3.6 MMOL/L (ref -7.7–1.9)
BASE EXCESS BLDV CALC-SCNC: 0.5 MMOL/L (ref -7.7–1.9)
BASE EXCESS BLDV CALC-SCNC: 2 MMOL/L (ref -7.7–1.9)
BASE EXCESS BLDV CALC-SCNC: 2.6 MMOL/L (ref -7.7–1.9)
BILIRUB SERPL-MCNC: 1.2 MG/DL (ref 0.2–1.3)
BILIRUB SERPL-MCNC: 1.3 MG/DL (ref 0.2–1.3)
BILIRUB SERPL-MCNC: 1.8 MG/DL (ref 0.2–1.3)
BILIRUB SERPL-MCNC: 2.1 MG/DL (ref 0.2–1.3)
BLD PROD TYP BPU: NORMAL
BLD PROD TYP BPU: NORMAL
BLOOD COMPONENT TYPE: NORMAL
BLOOD COMPONENT TYPE: NORMAL
BUN SERPL-MCNC: 11 MG/DL (ref 7–30)
BUN SERPL-MCNC: 8 MG/DL (ref 7–30)
BUN SERPL-MCNC: 8 MG/DL (ref 7–30)
BUN SERPL-MCNC: 9 MG/DL (ref 7–30)
C-ANCA TITR SER IF: NORMAL {TITER}
CALCIUM SERPL-MCNC: 8.7 MG/DL (ref 8.5–10.1)
CALCIUM SERPL-MCNC: 8.8 MG/DL (ref 8.5–10.1)
CALCIUM SERPL-MCNC: 8.9 MG/DL (ref 8.5–10.1)
CALCIUM SERPL-MCNC: 9 MG/DL (ref 8.5–10.1)
CHLORIDE BLD-SCNC: 104 MMOL/L (ref 94–109)
CHLORIDE BLD-SCNC: 104 MMOL/L (ref 94–109)
CHLORIDE BLD-SCNC: 105 MMOL/L (ref 94–109)
CHLORIDE BLD-SCNC: 105 MMOL/L (ref 94–109)
CO2 SERPL-SCNC: 24 MMOL/L (ref 20–32)
CO2 SERPL-SCNC: 26 MMOL/L (ref 20–32)
CODING SYSTEM: NORMAL
CODING SYSTEM: NORMAL
CREAT SERPL-MCNC: 0.64 MG/DL (ref 0.66–1.25)
CREAT SERPL-MCNC: 0.65 MG/DL (ref 0.66–1.25)
CREAT SERPL-MCNC: 0.69 MG/DL (ref 0.66–1.25)
CREAT SERPL-MCNC: 0.75 MG/DL (ref 0.66–1.25)
CROSSMATCH: NORMAL
CROSSMATCH: NORMAL
DIASTOLIC BLOOD PRESSURE - MUSE: NORMAL MMHG
ERYTHROCYTE [DISTWIDTH] IN BLOOD BY AUTOMATED COUNT: 18.8 % (ref 10–15)
ERYTHROCYTE [DISTWIDTH] IN BLOOD BY AUTOMATED COUNT: 19.1 % (ref 10–15)
GFR SERPL CREATININE-BSD FRML MDRD: >90 ML/MIN/1.73M2
GLUCOSE BLD-MCNC: 110 MG/DL (ref 70–99)
GLUCOSE BLD-MCNC: 119 MG/DL (ref 70–99)
GLUCOSE BLD-MCNC: 129 MG/DL (ref 70–99)
GLUCOSE BLD-MCNC: 81 MG/DL (ref 70–99)
GLUCOSE BLDC GLUCOMTR-MCNC: 117 MG/DL (ref 70–99)
GLUCOSE BLDC GLUCOMTR-MCNC: 69 MG/DL (ref 70–99)
GLUCOSE BLDC GLUCOMTR-MCNC: 85 MG/DL (ref 70–99)
GLUCOSE BLDC GLUCOMTR-MCNC: 94 MG/DL (ref 70–99)
HCO3 BLDV-SCNC: 21 MMOL/L (ref 21–28)
HCO3 BLDV-SCNC: 24 MMOL/L (ref 21–28)
HCO3 BLDV-SCNC: 25 MMOL/L (ref 21–28)
HCO3 BLDV-SCNC: 26 MMOL/L (ref 21–28)
HCO3 BLDV-SCNC: 27 MMOL/L (ref 21–28)
HCT VFR BLD AUTO: 27.6 % (ref 40–53)
HCT VFR BLD AUTO: 29.7 % (ref 40–53)
HGB BLD-MCNC: 8.4 G/DL (ref 13.3–17.7)
HGB BLD-MCNC: 9 G/DL (ref 13.3–17.7)
IGG SERPL-MCNC: 961 MG/DL (ref 610–1616)
INR PPP: 1.36 (ref 0.85–1.15)
INTERPRETATION ECG - MUSE: NORMAL
LACTATE SERPL-SCNC: 0.6 MMOL/L (ref 0.7–2)
LACTATE SERPL-SCNC: 0.7 MMOL/L (ref 0.7–2)
LACTATE SERPL-SCNC: 0.9 MMOL/L (ref 0.7–2)
LACTATE SERPL-SCNC: 1 MMOL/L (ref 0.7–2)
MAGNESIUM SERPL-MCNC: 1.9 MG/DL (ref 1.6–2.3)
MCH RBC QN AUTO: 28.6 PG (ref 26.5–33)
MCH RBC QN AUTO: 29 PG (ref 26.5–33)
MCHC RBC AUTO-ENTMCNC: 30.3 G/DL (ref 31.5–36.5)
MCHC RBC AUTO-ENTMCNC: 30.4 G/DL (ref 31.5–36.5)
MCV RBC AUTO: 94 FL (ref 78–100)
MCV RBC AUTO: 95 FL (ref 78–100)
O2/TOTAL GAS SETTING VFR VENT: 21 %
OXYHGB MFR BLDV: 59 % (ref 70–75)
OXYHGB MFR BLDV: 61 % (ref 70–75)
OXYHGB MFR BLDV: 63 % (ref 70–75)
P AXIS - MUSE: 51 DEGREES
P AXIS - MUSE: 71 DEGREES
P AXIS - MUSE: NORMAL DEGREES
PCO2 BLDV: 32 MM HG (ref 40–50)
PCO2 BLDV: 36 MM HG (ref 40–50)
PCO2 BLDV: 36 MM HG (ref 40–50)
PCO2 BLDV: 37 MM HG (ref 40–50)
PCO2 BLDV: 40 MM HG (ref 40–50)
PH BLDV: 7.42 [PH] (ref 7.32–7.43)
PH BLDV: 7.43 [PH] (ref 7.32–7.43)
PH BLDV: 7.44 [PH] (ref 7.32–7.43)
PH BLDV: 7.44 [PH] (ref 7.32–7.43)
PH BLDV: 7.46 [PH] (ref 7.32–7.43)
PHOSPHATE SERPL-MCNC: 3.4 MG/DL (ref 2.5–4.5)
PLATELET # BLD AUTO: 208 10E3/UL (ref 150–450)
PLATELET # BLD AUTO: 224 10E3/UL (ref 150–450)
PO2 BLDV: 33 MM HG (ref 25–47)
PO2 BLDV: 34 MM HG (ref 25–47)
PO2 BLDV: 35 MM HG (ref 25–47)
POTASSIUM BLD-SCNC: 3.8 MMOL/L (ref 3.4–5.3)
POTASSIUM BLD-SCNC: 3.8 MMOL/L (ref 3.4–5.3)
POTASSIUM BLD-SCNC: 3.9 MMOL/L (ref 3.4–5.3)
POTASSIUM BLD-SCNC: 5.1 MMOL/L (ref 3.4–5.3)
POTASSIUM BLD-SCNC: 5.5 MMOL/L (ref 3.4–5.3)
PR INTERVAL - MUSE: 202 MS
PR INTERVAL - MUSE: 240 MS
PR INTERVAL - MUSE: 272 MS
PROT SERPL-MCNC: 6 G/DL (ref 6.8–8.8)
PROT SERPL-MCNC: 6 G/DL (ref 6.8–8.8)
PROT SERPL-MCNC: 6.3 G/DL (ref 6.8–8.8)
PROT SERPL-MCNC: 6.7 G/DL (ref 6.8–8.8)
QRS DURATION - MUSE: 144 MS
QRS DURATION - MUSE: 150 MS
QRS DURATION - MUSE: 164 MS
QT - MUSE: 354 MS
QT - MUSE: 466 MS
QT - MUSE: 518 MS
QTC - MUSE: 470 MS
QTC - MUSE: 524 MS
QTC - MUSE: 578 MS
R AXIS - MUSE: -43 DEGREES
R AXIS - MUSE: -46 DEGREES
R AXIS - MUSE: -46 DEGREES
RBC # BLD AUTO: 2.9 10E6/UL (ref 4.4–5.9)
RBC # BLD AUTO: 3.15 10E6/UL (ref 4.4–5.9)
RHEUMATOID FACT SER NEPH-ACNC: <6 IU/ML
SODIUM SERPL-SCNC: 135 MMOL/L (ref 133–144)
SODIUM SERPL-SCNC: 136 MMOL/L (ref 133–144)
SODIUM SERPL-SCNC: 137 MMOL/L (ref 133–144)
SODIUM SERPL-SCNC: 137 MMOL/L (ref 133–144)
SYSTOLIC BLOOD PRESSURE - MUSE: NORMAL MMHG
T AXIS - MUSE: 109 DEGREES
T AXIS - MUSE: 90 DEGREES
T AXIS - MUSE: 95 DEGREES
UFH PPP CHRO-ACNC: 0.31 IU/ML
UNIT ABO/RH: NORMAL
UNIT ABO/RH: NORMAL
UNIT NUMBER: NORMAL
UNIT NUMBER: NORMAL
UNIT STATUS: NORMAL
UNIT STATUS: NORMAL
UNIT TYPE ISBT: 5100
UNIT TYPE ISBT: 5100
VENTRICULAR RATE- MUSE: 106 BPM
VENTRICULAR RATE- MUSE: 75 BPM
VENTRICULAR RATE- MUSE: 76 BPM
WBC # BLD AUTO: 5.3 10E3/UL (ref 4–11)
WBC # BLD AUTO: 6.2 10E3/UL (ref 4–11)

## 2022-06-20 PROCEDURE — 85610 PROTHROMBIN TIME: CPT | Performed by: INTERNAL MEDICINE

## 2022-06-20 PROCEDURE — 71045 X-RAY EXAM CHEST 1 VIEW: CPT | Mod: 26 | Performed by: RADIOLOGY

## 2022-06-20 PROCEDURE — 250N000013 HC RX MED GY IP 250 OP 250 PS 637

## 2022-06-20 PROCEDURE — 250N000013 HC RX MED GY IP 250 OP 250 PS 637: Performed by: INTERNAL MEDICINE

## 2022-06-20 PROCEDURE — 200N000002 HC R&B ICU UMMC

## 2022-06-20 PROCEDURE — 99291 CRITICAL CARE FIRST HOUR: CPT | Mod: GC | Performed by: INTERNAL MEDICINE

## 2022-06-20 PROCEDURE — 83605 ASSAY OF LACTIC ACID: CPT

## 2022-06-20 PROCEDURE — 85027 COMPLETE CBC AUTOMATED: CPT | Performed by: STUDENT IN AN ORGANIZED HEALTH CARE EDUCATION/TRAINING PROGRAM

## 2022-06-20 PROCEDURE — 99233 SBSQ HOSP IP/OBS HIGH 50: CPT | Performed by: INTERNAL MEDICINE

## 2022-06-20 PROCEDURE — 999N000075 HC STATISTIC IABP MONITORING

## 2022-06-20 PROCEDURE — 250N000013 HC RX MED GY IP 250 OP 250 PS 637: Performed by: STUDENT IN AN ORGANIZED HEALTH CARE EDUCATION/TRAINING PROGRAM

## 2022-06-20 PROCEDURE — 84100 ASSAY OF PHOSPHORUS: CPT | Performed by: INTERNAL MEDICINE

## 2022-06-20 PROCEDURE — 84155 ASSAY OF PROTEIN SERUM: CPT | Performed by: STUDENT IN AN ORGANIZED HEALTH CARE EDUCATION/TRAINING PROGRAM

## 2022-06-20 PROCEDURE — 3E043XZ INTRODUCTION OF VASOPRESSOR INTO CENTRAL VEIN, PERCUTANEOUS APPROACH: ICD-10-PCS | Performed by: INTERNAL MEDICINE

## 2022-06-20 PROCEDURE — 83735 ASSAY OF MAGNESIUM: CPT | Performed by: INTERNAL MEDICINE

## 2022-06-20 PROCEDURE — 999N000015 HC STATISTIC ARTERIAL MONITORING DAILY

## 2022-06-20 PROCEDURE — 82805 BLOOD GASES W/O2 SATURATION: CPT

## 2022-06-20 PROCEDURE — 250N000011 HC RX IP 250 OP 636: Performed by: INTERNAL MEDICINE

## 2022-06-20 PROCEDURE — 250N000011 HC RX IP 250 OP 636

## 2022-06-20 PROCEDURE — 999N000155 HC STATISTIC RAPCV CVP MONITORING

## 2022-06-20 PROCEDURE — 85520 HEPARIN ASSAY: CPT | Performed by: INTERNAL MEDICINE

## 2022-06-20 PROCEDURE — 84132 ASSAY OF SERUM POTASSIUM: CPT | Performed by: STUDENT IN AN ORGANIZED HEALTH CARE EDUCATION/TRAINING PROGRAM

## 2022-06-20 PROCEDURE — 71045 X-RAY EXAM CHEST 1 VIEW: CPT

## 2022-06-20 PROCEDURE — 80053 COMPREHEN METABOLIC PANEL: CPT | Performed by: STUDENT IN AN ORGANIZED HEALTH CARE EDUCATION/TRAINING PROGRAM

## 2022-06-20 PROCEDURE — 999N000045 HC STATISTIC DAILY SWAN MONITORING

## 2022-06-20 PROCEDURE — 250N000011 HC RX IP 250 OP 636: Performed by: STUDENT IN AN ORGANIZED HEALTH CARE EDUCATION/TRAINING PROGRAM

## 2022-06-20 PROCEDURE — 999N000157 HC STATISTIC RCP TIME EA 10 MIN

## 2022-06-20 RX ORDER — NALOXONE HYDROCHLORIDE 0.4 MG/ML
0.4 INJECTION, SOLUTION INTRAMUSCULAR; INTRAVENOUS; SUBCUTANEOUS
Status: CANCELLED | OUTPATIENT
Start: 2022-06-20

## 2022-06-20 RX ORDER — MAGNESIUM SULFATE HEPTAHYDRATE 40 MG/ML
2 INJECTION, SOLUTION INTRAVENOUS ONCE
Status: DISCONTINUED | OUTPATIENT
Start: 2022-06-20 | End: 2022-06-25

## 2022-06-20 RX ORDER — POTASSIUM CHLORIDE 29.8 MG/ML
20 INJECTION INTRAVENOUS ONCE
Status: COMPLETED | OUTPATIENT
Start: 2022-06-20 | End: 2022-06-21

## 2022-06-20 RX ORDER — FLUMAZENIL 0.1 MG/ML
0.2 INJECTION, SOLUTION INTRAVENOUS
Status: CANCELLED | OUTPATIENT
Start: 2022-06-20

## 2022-06-20 RX ORDER — NALOXONE HYDROCHLORIDE 0.4 MG/ML
0.2 INJECTION, SOLUTION INTRAMUSCULAR; INTRAVENOUS; SUBCUTANEOUS
Status: CANCELLED | OUTPATIENT
Start: 2022-06-20

## 2022-06-20 RX ORDER — NOREPINEPHRINE BITARTRATE 0.06 MG/ML
.01-.6 INJECTION, SOLUTION INTRAVENOUS CONTINUOUS
Status: DISCONTINUED | OUTPATIENT
Start: 2022-06-20 | End: 2022-06-21

## 2022-06-20 RX ORDER — POTASSIUM CHLORIDE 29.8 MG/ML
20 INJECTION INTRAVENOUS ONCE
Status: DISCONTINUED | OUTPATIENT
Start: 2022-06-20 | End: 2022-06-20 | Stop reason: CLARIF

## 2022-06-20 RX ORDER — FENTANYL CITRATE 50 UG/ML
25-50 INJECTION, SOLUTION INTRAMUSCULAR; INTRAVENOUS
Status: CANCELLED | OUTPATIENT
Start: 2022-06-20

## 2022-06-20 RX ORDER — MAGNESIUM SULFATE HEPTAHYDRATE 40 MG/ML
2 INJECTION, SOLUTION INTRAVENOUS ONCE
Status: COMPLETED | OUTPATIENT
Start: 2022-06-20 | End: 2022-06-20

## 2022-06-20 RX ORDER — TRAZODONE HYDROCHLORIDE 50 MG/1
100 TABLET, FILM COATED ORAL AT BEDTIME
Status: DISCONTINUED | OUTPATIENT
Start: 2022-06-20 | End: 2022-07-08

## 2022-06-20 RX ORDER — POTASSIUM CHLORIDE 29.8 MG/ML
20 INJECTION INTRAVENOUS ONCE
Status: COMPLETED | OUTPATIENT
Start: 2022-06-20 | End: 2022-06-20

## 2022-06-20 RX ADMIN — HYDROXYCHLOROQUINE SULFATE 200 MG: 200 TABLET, FILM COATED ORAL at 08:10

## 2022-06-20 RX ADMIN — POTASSIUM CHLORIDE 20 MEQ: 29.8 INJECTION, SOLUTION INTRAVENOUS at 23:11

## 2022-06-20 RX ADMIN — HEPARIN SODIUM 1100 UNITS/HR: 10000 INJECTION, SOLUTION INTRAVENOUS at 14:01

## 2022-06-20 RX ADMIN — DULOXETINE 30 MG: 30 CAPSULE, DELAYED RELEASE ORAL at 08:10

## 2022-06-20 RX ADMIN — HYDROXYCHLOROQUINE SULFATE 200 MG: 200 TABLET, FILM COATED ORAL at 19:39

## 2022-06-20 RX ADMIN — ISOSORBIDE DINITRATE 20 MG: 10 TABLET ORAL at 08:10

## 2022-06-20 RX ADMIN — FLUTICASONE FUROATE AND VILANTEROL TRIFENATATE 1 PUFF: 100; 25 POWDER RESPIRATORY (INHALATION) at 18:08

## 2022-06-20 RX ADMIN — HYDRALAZINE HYDROCHLORIDE 50 MG: 50 TABLET, FILM COATED ORAL at 21:52

## 2022-06-20 RX ADMIN — ACETAMINOPHEN 325 MG: 325 TABLET, FILM COATED ORAL at 12:39

## 2022-06-20 RX ADMIN — ASPIRIN 81 MG CHEWABLE TABLET 81 MG: 81 TABLET CHEWABLE at 08:10

## 2022-06-20 RX ADMIN — HYDRALAZINE HYDROCHLORIDE 50 MG: 50 TABLET, FILM COATED ORAL at 05:45

## 2022-06-20 RX ADMIN — ISOSORBIDE DINITRATE 20 MG: 10 TABLET ORAL at 12:01

## 2022-06-20 RX ADMIN — POTASSIUM CHLORIDE 20 MEQ: 1.5 POWDER, FOR SOLUTION ORAL at 08:10

## 2022-06-20 RX ADMIN — Medication 325 MG: at 12:01

## 2022-06-20 RX ADMIN — FUROSEMIDE 80 MG: 10 INJECTION, SOLUTION INTRAVENOUS at 08:10

## 2022-06-20 RX ADMIN — LIDOCAINE PATCH 4% 2 PATCH: 40 PATCH TOPICAL at 19:39

## 2022-06-20 RX ADMIN — THIAMINE HCL TAB 100 MG 100 MG: 100 TAB at 12:01

## 2022-06-20 RX ADMIN — ISOSORBIDE DINITRATE 20 MG: 10 TABLET ORAL at 17:57

## 2022-06-20 RX ADMIN — DOBUTAMINE HYDROCHLORIDE 7.5 MCG/KG/MIN: 200 INJECTION INTRAVENOUS at 10:35

## 2022-06-20 RX ADMIN — MAGNESIUM SULFATE IN WATER 2 G: 40 INJECTION, SOLUTION INTRAVENOUS at 05:45

## 2022-06-20 RX ADMIN — ACETAMINOPHEN 325 MG: 325 TABLET, FILM COATED ORAL at 18:48

## 2022-06-20 RX ADMIN — TRAZODONE HYDROCHLORIDE 100 MG: 50 TABLET ORAL at 21:52

## 2022-06-20 RX ADMIN — TAMSULOSIN HYDROCHLORIDE 0.4 MG: 0.4 CAPSULE ORAL at 08:10

## 2022-06-20 RX ADMIN — HYDRALAZINE HYDROCHLORIDE 50 MG: 50 TABLET, FILM COATED ORAL at 14:01

## 2022-06-20 RX ADMIN — POTASSIUM CHLORIDE 20 MEQ: 29.8 INJECTION, SOLUTION INTRAVENOUS at 06:42

## 2022-06-20 ASSESSMENT — ACTIVITIES OF DAILY LIVING (ADL)
ADLS_ACUITY_SCORE: 31

## 2022-06-20 NOTE — PROGRESS NOTES
Status 2 Heart Listing 6/20/22    Dandy Sands was approved for activation on the heart transplant waitlist by the Heart Transplant Committee.      This patient qualifies for Status 2 Listing based on the following criteria:    IABP     Coordinator reviewed listing criteria with Dr. Lora prior to submission of Status 2 form in [facility].     Insurance Approval:  YES (verify prior to listing with PFR)  ABO verification complete:  YES  2nd ABO verified by:  Lo Lloyd    On-call transplant coordinators, transplant LPN, immunology lab and PFR notified of listing.  Patient's primary cardiologist and/or attending physician is in agreement with this plan.      Status 2 Extension due 7/4/2022

## 2022-06-20 NOTE — COMMITTEE REVIEW
Thoracic Committee Review Note     Evaluation Date: 5/18/2022  Committee Review Date: 6/20/2022    Organ being evaluated for: Heart    Transplant Phase: Evaluation  Transplant Status: Active    Transplant Coordinator: Jessica Gross MD: Kelly Lora       Referring Physician:     Primary Diagnosis: Dilated Myopathy: Ischemic  Secondary Diagnosis:     Committee Review Members:  Cardiovascular Disease Camelia Cho RN   Transplant Lo Jones RN, Rosaline Mendez MD, Yoli Askew MD, Hayden Veras MD, Justo Stewart MD, Kelly Lora MD       Transplant Eligibility: Disabling heart disease on maximal medical therapy    Committee Review Decision: Approved    Relative Contraindications: Other, see committee review note.    Absolute Contraindications: None    Committee Chair Rosaline Mendez MD verbally attested to the committee's decision.    Committee Discussion Details: Urgent Huddle.     61-year-old male with heart failure with reduced ejection fraction secondary to ischemic cardiomyopathy, presents as a transfer from [facility] in acute decompensated heart failure and cardiogenic shock.  Patient well-known to us from a prior admission where he presented with new ischemic lesions and mitral insufficiency, we were considering coronary bypass grafting as well as mitral valve replacement.  Ultimately decided that with his severely low ejection fraction and not having good viability, to proceed with medical therapy, PCI of his LAD and RCA territories, and evaluating for LVAD and transplant. He finished most of the evaluation during that admission and was to finish it as an outpatient.  Developed more shortness of breath and nausea consistent with low output heart failure, complicated by liver injury due to low cardiac output; he was hospitalized in [place] before a bed was available here.  He was started on dobutamine. He had a lactic acidosis, LFTs to 1000s,  technically acute kidney injury given prior baseline creatinine of 0.8 and currently 1.2, estimated mixed venous from his PICC line of 35%, which calculates an estimated Marley cardiac index of 1.6.  We have gone up on the dobutamine to 7.5 and he is already tachycardic to the 110s.  Given the endorgan dysfunction despite inotropes we elected proceed with temporary mechanical support and undergo balloon pump placement.  Too sick for CPX.  RA 7, wedge 16 from swan numbers today. EF 10-15%. Pt received IABP before swan. Discussed abnormals. PFTs don't accurately reflect since pt is on a balloon pump.  Pt is on room air. CT chest abnormal. Pulmonary saw pt. Prior smoker. Pt approved for transplant. Will plan for Subclavian IABP on Wed or Thurs. Pt qualifies for status 2. IABP was placed before swan, but pt's AST was >1000.  Should book an OR date in 2 weeks for a VAD if pt doesn't get transplanted. Will do high PRA serum send out. He received a blood transfusion recently at OSH. Still needs transplant teaching. Will coordinate.    Plan to list patient status 2 today.

## 2022-06-20 NOTE — PROGRESS NOTES
PULMONARY CONSULT  Date of service: 2022    Patient: Dandy Sands      : 1960      MRN: [id number]    We were consulted by Dr. Galan for evaluation of moderate emphysema on imaging studies in the context of candidacy for heart transplant.        Impressions/Recommendations:   61 year old male with PMHx most significant for tobacco dependence in remission, coronary artery disease, ischemic cardiomyopathy with heart failure reduced EF, cardiogenic shock, antiphospholipid syndrome with DVT on chronic anticoagulation, lupus, on Plaquenil and mycophenolate.  Pulmonary service was consulted to comment on his CT scan finding showing emphysema in the context of candidacy for heart transplant.    1.  paraseptal emphysema  2.  Chronic subpleural reticular and fibrotic changes   3. history of tobacco dependence  4.  Ischemic cardiomyopathy secondary to CAD  5.  Lupus  6.  Antiphospholipid syndrome on chronic anticoagulation for DVT.    Patient had his PFTs performed on .  He has significant reduced FVC and FEV1.  No significant obstruction was noted.  Patient has had poor functional status in last few months with repeated hospitalization.  And current PFT was obtained while he was on a balloon pump.    - It is hard to comment on the progression of lupus associated ILD.  But I compared his CT scan from  to the one from May.  And no major worsening is noted.  Patient has interstitial infiltrate as well as fluid along the fissure.  Interstitial thickening could be secondary to lupus associated pneumonitis but fluid along the fissure is most likely volume overload.  - He definitely has some reticular and fibrotic changes.  But that could be present there for some time.  As of now patient is on room air.    -It is difficult to say with collection how his disease will progress.  Generally lupus associated interstitial lung disease can be well controlled with the help of immunosuppressive.  Patient is  currently on room air which argues against significant lung disease.    Follow-up with the pulmonary as an outpatient repeat pulmonary function test when he is not in his heart failure exacerbation.    At this point based on the limited information available we should be able to manage his lung disease in future.    -Either for LVAD placement or for heart transplant he will have 7% risk of requiring post operative mechanical ventilation for more than 48 hours as per CLEMENT score.   -Overall post operative pulmonary complication risk which included, infection, atelectasis, aspiration and respiratory failure will be 42% as per ARISCAT score.     -I would definitely recommend for the patient to be started on LABA ICS therapy.  We will place an order.    -He will need a pulmonary follow-up as an outpatient with serial PFT high-resolution CT scan with inspiration and expiration views.  I will send a message to our coordinator to set up an outpatient follow-up visit.     Jerome Minor Md    Image:     5/2022 6/2022            History of Present Illness:   Dandy Sands is a 61 year old male who was transferred to [facility] from [facility] in [facility] on 6/17/2022 for advanced heart failure treatment evaluation.  Patient has a history of CAD SP remote PCI to the LAD, ischemic cardiomyopathy with EF 30% SP CRT-D, severe MR, antiphospholipid on chronic anticoagulation for history of DVT/PE, history of COVID-19 in January 2022 did not require intubation, hypertension and hyperlipidemia.  Patient was transferred from [place] to manage cardiogenic shock.  CT scan of the chest showed emphysematous changes, so pulmonary was consulted in the context of heart transplant evaluation.  Reviewing the chart and care everywhere, I do not see previous PFTs.    Patient reports that he used to smoke 1 to 2 packs a day for 25 years, he stopped 15 years ago after his heart attack, the patient denied significant shortness  of breath before he stopped smoking, but he said that he noted significant improvement in his exercise tolerance after he stopped smoking 15 years ago.  Patient denied recurrent bouts of bronchitis or pneumonia in the past.  He denied known lung disease in the past except for his recent COVID infection in January 2022, at that time he was admitted to the hospital but did not require intubation and mechanical ventilation.  The patient is not on any inhaler therapy as an outpatient and he has never been on inhaler therapy.  He has never been told that he has COPD or emphysema.              Review of Symptoms:   10-point ROS reviewed, & found negative w/ exceptions noted in the HPI.          Past Medical History:     No past medical history on file.    Past Surgical History:   Procedure Laterality Date     COLONOSCOPY N/A 05/23/2022    Procedure: COLONOSCOPY;  Surgeon: Parth Meneses MD;  Location: [facility] OR     CV CORONARY ANGIOGRAM N/A 5/24/2022    Procedure: Coronary Angiogram;  Surgeon: Mike Pope MD;  Location:  HEART CARDIAC CATH LAB     CV CORONARY ANGIOGRAM N/A 5/29/2022    Procedure: Coronary Angiogram;  Surgeon: Austin Bright MD;  Location:  HEART CARDIAC CATH LAB     CV CORONARY LITHOTRIPSY PCI Bilateral 5/24/2022    Procedure: Percutaneous Coronary Intervention - Lithotripsy;  Surgeon: Mike Pope MD;  Location:  HEART CARDIAC CATH LAB     CV INTRAVASULAR ULTRASOUND N/A 5/24/2022    Procedure: Intravascular Ultrasound;  Surgeon: Mike Pope MD;  Location:  HEART CARDIAC CATH LAB     CV PCI ANGIOPLASTY N/A 5/29/2022    Procedure: Percutaneous Transluminal Angioplasty;  Surgeon: Austin Bright MD;  Location:  HEART CARDIAC CATH LAB     CV PCI STENT DRUG ELUTING N/A 5/24/2022    Procedure: Percutaneous Coronary Intervention Stent;  Surgeon: Mike Pope MD;  Location: [facility] CARDIAC CATH LAB     CV RIGHT HEART CATH MEASUREMENTS RECORDED N/A 05/18/2022     Procedure: Right Heart Catheterization;  Surgeon: Justo Stewart MD;  Location:  HEART CARDIAC CATH LAB     CV RIGHT HEART CATH MEASUREMENTS RECORDED N/A 5/24/2022    Procedure: Right Heart Catheterization;  Surgeon: Mike Pope MD;  Location:  HEART CARDIAC CATH LAB     CV RIGHT HEART CATH MEASUREMENTS RECORDED N/A 5/29/2022    Procedure: Right Heart Catheterization;  Surgeon: Austin Bright MD;  Location:  HEART CARDIAC CATH LAB     CV RIGHT HEART EXERCISE STRESS STUDY N/A 05/18/2022    Procedure: Stress Drug Study;  Surgeon: Justo Stewart MD;  Location:  HEART CARDIAC CATH LAB     PICC DOUBLE LUMEN PLACEMENT Right 05/28/2022    right basilic 5 fr dl picc 40 cm            Allergies:     No Known Allergies          Outpatient Medications:     No current facility-administered medications on file prior to encounter.  atorvastatin (LIPITOR) 40 MG tablet, Take 40 mg by mouth daily  clopidogrel (PLAVIX) 75 MG tablet, Take 1 tablet (75 mg) by mouth daily  DULoxetine (CYMBALTA) 30 MG capsule, Take 1 capsule (30 mg) by mouth daily  famotidine (PEPCID) 20 MG tablet, Take 20 mg by mouth 2 times daily  ferrous sulfate (FE TABS) 325 (65 Fe) MG EC tablet, Take 325 mg by mouth daily (with lunch)  furosemide (LASIX) 40 MG tablet, Take 1 tablet (40 mg) by mouth daily  hydroxychloroquine (PLAQUENIL) 200 MG tablet, Take 200 mg by mouth 2 times daily  hydrOXYzine (ATARAX) 25 MG tablet, Take 1 tablet (25 mg) by mouth every 6 hours as needed for anxiety (ANXIETY)  LORazepam (ATIVAN) 0.5 MG tablet, Take 0.5-1 mg by mouth every 4 hours as needed for anxiety  melatonin 3 MG tablet, Take 6 mg by mouth nightly as needed for sleep  mycophenolate (GENERIC EQUIVALENT) 500 MG tablet, Take 1,500 mg by mouth 2 times daily  potassium chloride ER (KLOR-CON M) 20 MEQ CR tablet, Take 1 tablet (20 mEq) by mouth 2 times daily  promethazine (PHENERGAN) 12.5 MG tablet, Take 1 tablet (12.5 mg) by mouth every 6 hours as needed for nausea  "or vomiting  tamsulosin (FLOMAX) 0.4 MG capsule, Take 0.4 mg by mouth daily  thiamine (B-1) 100 MG tablet, Take 1 tablet (100 mg) by mouth daily  warfarin ANTICOAGULANT (COUMADIN) 5 MG tablet, Take 1 tablet (5 mg) by mouth daily Get an INR on Friday 5/27 and then follow instructions by your coumadin clinic  zolpidem (AMBIEN) 5 MG tablet, Take 5 mg by mouth nightly as needed for sleep  [DISCONTINUED] lisinopril (ZESTRIL) 2.5 MG tablet, Take 1 tablet (2.5 mg) by mouth 2 times daily  [DISCONTINUED] ticagrelor (BRILINTA) 90 MG tablet, Take 1 tablet (90 mg) by mouth 2 times daily              Family History:   No family history for emphysema or COPD          Social History:   25-40-pack-year smoking history, quit 15 years ago.  Denied vaping.  No excessive alcohol consumption.  No drugs.  Denied occupational exposure.          Physical Exam:   /77   Pulse 81   Temp 97.5  F (36.4  C) (Oral)   Resp 24   Ht 1.702 m (5' 7\")   Wt 67.9 kg (149 lb 11.1 oz)   SpO2 95%   BMI 23.45 kg/m      General: NAD  HEENT: Anicteric sclera, EOMI, OP unobstructed, w/o erythema/discharge  CV: RRR, no m/r/g  Lungs: Clear to auscultation bilaterally, no rales, rhonchi or wheezing.  Abd: Soft, NT, ND  Ext: No LE edema  Skin: No rashes, cyanosis, or jaundice  Neuro: AAOx3, no focal deficits          Data:   Labs (all laboratory studies reviewed by me)    Imaging (all imaging studies reviewed by me):  CT CHEST ABDOMEN PELVIS W/O CONTRAST, 5/19/2022   IMPRESSION:    No acute or suspicious findings in exam. Pulmonary artery enlargement  suggesting pulmonary artery hypertension. Chronic interstitial  subpleural fibrotic disease with paraseptal emphysematous change.    Bedside spirometry  Vital capacity 1500  FEV1 1.41L, predicted 4 L (34% predicted)    Procedures:   None         "

## 2022-06-20 NOTE — CONSULTS
Transplant/LVAD Admission Psychosocial Assessment    Patient Name: Dandy Sands  : 1960  Age: 61 year old  MRN: [id number]  Date of Initial Social Work Evaluation: 2022    Patient anticipated to need advanced therapies this admission. Pt has been undergoing advanced therapy evaluation prior to this admission.  Met with Pt and spoke to dtrAsha, via phone ([phone]) to update psychosocial assessment and provide ongoing education about SW role while inpatient, and to continue discussions of expectations/requirements, caregiver needs and follow up needs post-transplant or LVAD.     Presenting Information   Living Situation: Pt lives alone in a single family one level home w/ 3 steps to enter. Pt's adult children live close to pt.  If not local, plans for short term stay:  Discussed w/ pt and dtr local accommodation options-they have been placed on the waiting list for an [employer] [address]   Previous Functional Status: Independent w/ ADLs. Has been more challenging to complete IADLs due to progression of illness.   Cultural/Language/Spiritual Considerations: none     Support System  Primary Support Person Pt's dtrAsha, and son, Amos  Other support:  Nijanes   Plan for support in immediate post-LVAD/transplant period: Pt's dtr and son will provide shared caregiver support during temporary relocation     Health Care Directive  Decision Maker: Pt  Alternate Decision Maker: Pt's dtr and son  Health Care Directive: Copy in Chart    Mental Health/Coping:   History of Mental Health: None   History of Chemical Health: No-reports occasional beer or whiskey. Reports Dad was alcoholic. In his late teens/early 20's, reports Mom asked him to cut back on alcohol intake and went to treatment when he was 19. States he will drink 1-2 drinks while playing pool. Denies all illicit drug use.  Current status: No mental health, chemical dependency or smoking concerns.   Coping: Pt appropriately anxious about what  medical plan will be.   Services Needed/Recommended: None currently    Financial   Income: No income-company did not offer STD or LTD and has not been able to work due to illness   Impact of transplant/LVAD on income: Pt will need to draw from his longterm   Insurance and medication coverage: Yes-COBRA   Financial concerns: Not currently   Resources needed: Will need to access mike funds for temporary relocation.    Education provided by SW: Social Work role inpatient setting, transplant and LVAD teaching, temporary relocation, caregiver support     Assessment and recommendations and plan:      Pt had been undergoing LVAD and transplant evaluation. Team met today and plan will be to pursue transplant and after two if not transplanted will go for an LVAD.     There had been some concern in evaluation about pt's insurance. Pt's dtr informed writer that once pt's FMLA expires, which is within the next week or two, he will go on COBRA for insurance through his employer. Pt plans on accessing his longterm to pay for COBRA.     Caregiver plan will be tough but dtr reports pt will have what he needs. Caregiver support will be provided by pt's dtr and son.     From a psychosocial perspective, pt continues to be a good candidate for advanced therapies.

## 2022-06-20 NOTE — PLAN OF CARE
Major Shift Events:  Pt A&Ox4, able to make needs known. SR w/ R femoral IABP 1:1 w/ 100% augmentation. Dobutamine weaned to 5 this afternoon. AZUL Q6, see flowsheets for full numbers. CVP 2 @ 1600, afternoon lasix held and PO intake encouraged, no fluids given per Cards 2. K+ held d/t being 5.1 and lasix being held, currently 3.9. [place] at 46cm. PRN tylenol given x2 for headache.   Plan: Exchange femoral for subclavian IABP on Wednesday. Listed 6/20 as status 2.   For vital signs and complete assessments, please see documentation flowsheets.

## 2022-06-20 NOTE — PROGRESS NOTES
"CLINICAL NUTRITION SERVICES - ASSESSMENT NOTE     Nutrition Prescription    RECOMMENDATIONS FOR MDs/PROVIDERS TO ORDER:  -If EN needed post-VAD placement, please re-consult: \"Registered Dietitian to Assess and Order TF per Medical Nutrition Therapy Protocol\"  -If feeding tube needed post-VAD placement, please consult radiology.     Malnutrition Status:    Severe malnutrition in the context of chronic illness    Recommendations already ordered by Registered Dietitian (RD):  Ordered 100 mg thiamine daily for EF <30%    Future/Additional Recommendations:  If EN needed post-VAD placement, recommend:  Osmolite 1.5 Virgilio @ goal of 50ml/hr (1200ml/day) + 1 pkt Prosource TID will provide: 1920 kcals (29 kcal/kg), 108 g PRO (1.6 g/kg), 914 ml free H20, 244 g CHO, and 0 g fiber daily.  - Initiate @ 20 ml/hr and advance by 10 ml q8hr as tolerated  - Do not start or advance until lytes (Mg++,K+) WNL and phos>2.0  - Recommend 30 ml q4hr fluid flushes for tube patency. Additional fluids and/or adjustments per MD.    - Order multivitamin/mineral (15 ml/day via FT) to help ensure micronutrient needs being met with suspected hypermetabolic demands and potential interruptions to TF infusions.  - Elevated HOB with gastric feeds      REASON FOR ASSESSMENT  Dandy Sands is a/an 61 year old male assessed by the dietitian for Admission Nutrition Risk Screen for positive    NUTRITION HISTORY  RD consulted for 2g Na diet education, however pt received this education on 5/28 and reports that he follows a low sodium diet at home. Did not repeat low sodium education this admission.     Per most recent RD note on 5/17, \"per discussion with pt and daughter who was at bedside, he has traditionally had a good appetite and ate heartily. Likes protein options. In January, he developed a lack of appetite started when he contracted COVID-19 and had some taste changes (which improved some over time). He is eating less than 50% of his usual oral intake, " "especially over the last two and a half months. Eating less due to lack of appetite and changes he has made with following a low-sodium diet (has been following for ~2.5 months and is now eating differently). States limiting his sodium is easy but limiting his fluid is difficult.\"    CURRENT NUTRITION ORDERS  Diet: NPO for procedure/surgery   Intake/Tolerance: Pt eating 50% meals per RN documentation    LABS  Labs reviewed    MEDICATIONS   Medications reviewed    ANTHROPOMETRICS  Height: 170.2 cm (5' 7\")  Most Recent Weight: 67.9 kg (149 lb 11.1 oz)    IBW: 67.3 kg  BMI: Normal BMI  Weight History:   Wt Readings from Last 10 Encounters:   06/20/22 67.9 kg (149 lb 11.1 oz)   05/29/22 65 kg (143 lb 4.8 oz)   05/26/22 64.6 kg (142 lb 8 oz)     Dosing Weight: 67 kg (actual BW)    ASSESSED NUTRITION NEEDS  Estimated Energy Needs: 2916-1641 kcals/day (25 - 30 kcals/kg)  Justification: Maintenance  Estimated Protein Needs: 100-135 grams protein/day (1.5 - 2 grams of pro/kg)  Justification: post-LVAD needs  Estimated Fluid Needs: 2000 mL/day (1 mL/kcal)   Justification: Maintenance    PHYSICAL FINDINGS  See malnutrition section below.  No abnormal nutrition-related physical findings observed.     MALNUTRITION  % Intake: </= 50% for >/= 5 days (severe)  % Weight Loss: None noted  Subcutaneous Fat Loss: Global mild  Muscle Loss: Temporal:  Moderate, Thoracic region (clavicle, acromium bone, deltoid, trapezius, pectoral):  Mild, Upper leg (quadricep, hamstring):  Mild and Posterior calf:  Moderate  Fluid Accumulation/Edema: None noted  Malnutrition Diagnosis: Severe malnutrition in the context of chronic illness    NUTRITION DIAGNOSIS  Predicted inadequate nutrient intake related to intubation as evidenced by NPO status and possible need for EN post-VAD.       INTERVENTIONS  Implementation  Multi-trace element supplement therapy     Goals  Diet adv v nutrition support within 2-3 days OR Patient to consume % of " nutritionally adequate meal trays TID, or the equivalent with supplements/snacks.     Monitoring/Evaluation  Progress toward goals will be monitored and evaluated per protocol.    Kelsey Prado MS, RD, LD  4E ([facility]) RD pager: [phone]  Ascom: [phone]  Weekend/Holiday RD pager: [phone]

## 2022-06-21 ENCOUNTER — APPOINTMENT (OUTPATIENT)
Dept: OCCUPATIONAL THERAPY | Facility: CLINIC | Age: 62
DRG: 001 | End: 2022-06-21
Attending: INTERNAL MEDICINE
Payer: COMMERCIAL

## 2022-06-21 ENCOUNTER — DOCUMENTATION ONLY (OUTPATIENT)
Dept: TRANSPLANT | Facility: CLINIC | Age: 62
End: 2022-06-21

## 2022-06-21 ENCOUNTER — APPOINTMENT (OUTPATIENT)
Dept: GENERAL RADIOLOGY | Facility: CLINIC | Age: 62
DRG: 001 | End: 2022-06-21
Attending: INTERNAL MEDICINE
Payer: COMMERCIAL

## 2022-06-21 LAB
ABO/RH(D): NORMAL
ALBUMIN SERPL BCG-MCNC: 3.1 G/DL (ref 3.5–5.2)
ALBUMIN SERPL-MCNC: 2.8 G/DL (ref 3.4–5)
ALBUMIN SERPL-MCNC: 2.9 G/DL (ref 3.4–5)
ALDOLASE SERPL-CCNC: 19.6 U/L
ALP SERPL-CCNC: 96 U/L (ref 40–150)
ALP SERPL-CCNC: 98 U/L (ref 40–129)
ALP SERPL-CCNC: 98 U/L (ref 40–150)
ALT SERPL W P-5'-P-CCNC: 569 U/L (ref 10–50)
ALT SERPL W P-5'-P-CCNC: 652 U/L (ref 0–70)
ALT SERPL W P-5'-P-CCNC: 719 U/L (ref 0–70)
ANION GAP SERPL CALCULATED.3IONS-SCNC: 6 MMOL/L (ref 3–14)
ANION GAP SERPL CALCULATED.3IONS-SCNC: 6 MMOL/L (ref 3–14)
ANION GAP SERPL CALCULATED.3IONS-SCNC: 8 MMOL/L (ref 7–15)
ANTIBODY SCREEN: NEGATIVE
AST SERPL W P-5'-P-CCNC: 105 U/L (ref 0–45)
AST SERPL W P-5'-P-CCNC: 125 U/L (ref 0–45)
AST SERPL W P-5'-P-CCNC: 99 U/L (ref 10–50)
BASE EXCESS BLDV CALC-SCNC: 0.2 MMOL/L (ref -7.7–1.9)
BASE EXCESS BLDV CALC-SCNC: 1.7 MMOL/L (ref -7.7–1.9)
BASE EXCESS BLDV CALC-SCNC: 1.9 MMOL/L (ref -7.7–1.9)
BASE EXCESS BLDV CALC-SCNC: 2 MMOL/L (ref -7.7–1.9)
BILIRUB SERPL-MCNC: 0.6 MG/DL
BILIRUB SERPL-MCNC: 0.8 MG/DL (ref 0.2–1.3)
BILIRUB SERPL-MCNC: 1.3 MG/DL (ref 0.2–1.3)
BUN SERPL-MCNC: 11.5 MG/DL (ref 8–23)
BUN SERPL-MCNC: 9 MG/DL (ref 7–30)
BUN SERPL-MCNC: 9 MG/DL (ref 7–30)
CALCIUM SERPL-MCNC: 8.8 MG/DL (ref 8.5–10.1)
CALCIUM SERPL-MCNC: 9 MG/DL (ref 8.5–10.1)
CALCIUM SERPL-MCNC: 9 MG/DL (ref 8.8–10.2)
CCP AB SER IA-ACNC: 1.7 U/ML
CHLORIDE BLD-SCNC: 106 MMOL/L (ref 94–109)
CHLORIDE BLD-SCNC: 107 MMOL/L (ref 94–109)
CHLORIDE SERPL-SCNC: 104 MMOL/L (ref 98–107)
CO2 SERPL-SCNC: 25 MMOL/L (ref 20–32)
CO2 SERPL-SCNC: 25 MMOL/L (ref 20–32)
CREAT SERPL-MCNC: 0.61 MG/DL (ref 0.66–1.25)
CREAT SERPL-MCNC: 0.79 MG/DL (ref 0.66–1.25)
CREAT SERPL-MCNC: 0.81 MG/DL (ref 0.67–1.17)
DEPRECATED HCO3 PLAS-SCNC: 24 MMOL/L (ref 22–29)
ENA JO1 AB SER IA-ACNC: <0.5 U/ML
ENA JO1 IGG SER-ACNC: NEGATIVE
ENA SCL70 IGG SER IA-ACNC: <0.6 U/ML
ENA SCL70 IGG SER IA-ACNC: NEGATIVE
ENA SS-A AB SER IA-ACNC: <0.5 U/ML
ENA SS-A AB SER IA-ACNC: NEGATIVE
ENA SS-B IGG SER IA-ACNC: <0.6 U/ML
ENA SS-B IGG SER IA-ACNC: NEGATIVE
ERYTHROCYTE [DISTWIDTH] IN BLOOD BY AUTOMATED COUNT: 18.4 % (ref 10–15)
ERYTHROCYTE [DISTWIDTH] IN BLOOD BY AUTOMATED COUNT: 18.8 % (ref 10–15)
GFR SERPL CREATININE-BSD FRML MDRD: >90 ML/MIN/1.73M2
GLUCOSE BLD-MCNC: 135 MG/DL (ref 70–99)
GLUCOSE BLD-MCNC: 139 MG/DL (ref 70–99)
GLUCOSE BLDC GLUCOMTR-MCNC: 120 MG/DL (ref 70–99)
GLUCOSE BLDC GLUCOMTR-MCNC: 90 MG/DL (ref 70–99)
GLUCOSE SERPL-MCNC: 116 MG/DL (ref 70–99)
HCO3 BLDV-SCNC: 24 MMOL/L (ref 21–28)
HCO3 BLDV-SCNC: 26 MMOL/L (ref 21–28)
HCO3 BLDV-SCNC: 26 MMOL/L (ref 21–28)
HCO3 BLDV-SCNC: 27 MMOL/L (ref 21–28)
HCT VFR BLD AUTO: 28.5 % (ref 40–53)
HCT VFR BLD AUTO: 29 % (ref 40–53)
HGB BLD-MCNC: 8.7 G/DL (ref 13.3–17.7)
HGB BLD-MCNC: 8.8 G/DL (ref 13.3–17.7)
IGG SERPL-MCNC: 967 MG/DL (ref 610–1616)
IGG1 SER-MCNC: 599 MG/DL (ref 382–929)
IGG2 SER-MCNC: 197 MG/DL (ref 242–700)
IGG3 SER-MCNC: 45 MG/DL (ref 22–176)
IGG4 SER-MCNC: 21 MG/DL (ref 4–86)
INR PPP: 1.28 (ref 0.85–1.15)
LACTATE SERPL-SCNC: 0.6 MMOL/L (ref 0.7–2)
LACTATE SERPL-SCNC: 0.7 MMOL/L (ref 0.7–2)
LACTATE SERPL-SCNC: 1.1 MMOL/L (ref 0.7–2)
LACTATE SERPL-SCNC: 1.2 MMOL/L (ref 0.7–2)
MAGNESIUM SERPL-MCNC: 2.2 MG/DL (ref 1.6–2.3)
MCH RBC QN AUTO: 28.9 PG (ref 26.5–33)
MCH RBC QN AUTO: 29.3 PG (ref 26.5–33)
MCHC RBC AUTO-ENTMCNC: 30.3 G/DL (ref 31.5–36.5)
MCHC RBC AUTO-ENTMCNC: 30.5 G/DL (ref 31.5–36.5)
MCV RBC AUTO: 95 FL (ref 78–100)
MCV RBC AUTO: 96 FL (ref 78–100)
O2/TOTAL GAS SETTING VFR VENT: 0 %
O2/TOTAL GAS SETTING VFR VENT: 21 %
O2/TOTAL GAS SETTING VFR VENT: 21 %
O2/TOTAL GAS SETTING VFR VENT: 35 %
OXYHGB MFR BLDV: 54 % (ref 70–75)
OXYHGB MFR BLDV: 54 % (ref 70–75)
OXYHGB MFR BLDV: 63 % (ref 70–75)
OXYHGB MFR BLDV: 66 % (ref 70–75)
PCO2 BLDV: 36 MM HG (ref 40–50)
PCO2 BLDV: 39 MM HG (ref 40–50)
PCO2 BLDV: 40 MM HG (ref 40–50)
PCO2 BLDV: 40 MM HG (ref 40–50)
PH BLDV: 7.43 [PH] (ref 7.32–7.43)
PH BLDV: 7.43 [PH] (ref 7.32–7.43)
PH BLDV: 7.44 [PH] (ref 7.32–7.43)
PH BLDV: 7.44 [PH] (ref 7.32–7.43)
PHOSPHATE SERPL-MCNC: 4.3 MG/DL (ref 2.5–4.5)
PLATELET # BLD AUTO: 209 10E3/UL (ref 150–450)
PLATELET # BLD AUTO: 242 10E3/UL (ref 150–450)
PO2 BLDV: 32 MM HG (ref 25–47)
PO2 BLDV: 32 MM HG (ref 25–47)
PO2 BLDV: 36 MM HG (ref 25–47)
PO2 BLDV: 37 MM HG (ref 25–47)
POTASSIUM BLD-SCNC: 4.1 MMOL/L (ref 3.4–5.3)
POTASSIUM BLD-SCNC: 4.3 MMOL/L (ref 3.4–5.3)
POTASSIUM SERPL-SCNC: 4.4 MMOL/L (ref 3.4–4.5)
PROT SERPL-MCNC: 6 G/DL (ref 6.4–8.3)
PROT SERPL-MCNC: 6.2 G/DL (ref 6.8–8.8)
PROT SERPL-MCNC: 6.2 G/DL (ref 6.8–8.8)
RBC # BLD AUTO: 2.97 10E6/UL (ref 4.4–5.9)
RBC # BLD AUTO: 3.05 10E6/UL (ref 4.4–5.9)
SODIUM SERPL-SCNC: 136 MMOL/L (ref 136–145)
SODIUM SERPL-SCNC: 137 MMOL/L (ref 133–144)
SODIUM SERPL-SCNC: 138 MMOL/L (ref 133–144)
SPECIMEN EXPIRATION DATE: NORMAL
SUBCLASSES, PERCENT: 89 %
UFH PPP CHRO-ACNC: 0.35 IU/ML
WBC # BLD AUTO: 5 10E3/UL (ref 4–11)
WBC # BLD AUTO: 5.3 10E3/UL (ref 4–11)

## 2022-06-21 PROCEDURE — 86832 HLA CLASS I HIGH DEFIN QUAL: CPT | Performed by: INTERNAL MEDICINE

## 2022-06-21 PROCEDURE — 86833 HLA CLASS II HIGH DEFIN QUAL: CPT | Performed by: INTERNAL MEDICINE

## 2022-06-21 PROCEDURE — 71045 X-RAY EXAM CHEST 1 VIEW: CPT | Mod: 26 | Performed by: RADIOLOGY

## 2022-06-21 PROCEDURE — 83735 ASSAY OF MAGNESIUM: CPT | Performed by: INTERNAL MEDICINE

## 2022-06-21 PROCEDURE — 86850 RBC ANTIBODY SCREEN: CPT | Performed by: INTERNAL MEDICINE

## 2022-06-21 PROCEDURE — 97110 THERAPEUTIC EXERCISES: CPT | Mod: GO | Performed by: OCCUPATIONAL THERAPIST

## 2022-06-21 PROCEDURE — 250N000013 HC RX MED GY IP 250 OP 250 PS 637: Performed by: STUDENT IN AN ORGANIZED HEALTH CARE EDUCATION/TRAINING PROGRAM

## 2022-06-21 PROCEDURE — 84155 ASSAY OF PROTEIN SERUM: CPT | Performed by: STUDENT IN AN ORGANIZED HEALTH CARE EDUCATION/TRAINING PROGRAM

## 2022-06-21 PROCEDURE — 85027 COMPLETE CBC AUTOMATED: CPT | Performed by: STUDENT IN AN ORGANIZED HEALTH CARE EDUCATION/TRAINING PROGRAM

## 2022-06-21 PROCEDURE — 85610 PROTHROMBIN TIME: CPT | Performed by: INTERNAL MEDICINE

## 2022-06-21 PROCEDURE — 82805 BLOOD GASES W/O2 SATURATION: CPT

## 2022-06-21 PROCEDURE — 200N000002 HC R&B ICU UMMC

## 2022-06-21 PROCEDURE — 250N000013 HC RX MED GY IP 250 OP 250 PS 637

## 2022-06-21 PROCEDURE — 84100 ASSAY OF PHOSPHORUS: CPT | Performed by: INTERNAL MEDICINE

## 2022-06-21 PROCEDURE — 71045 X-RAY EXAM CHEST 1 VIEW: CPT

## 2022-06-21 PROCEDURE — 83605 ASSAY OF LACTIC ACID: CPT

## 2022-06-21 PROCEDURE — 250N000013 HC RX MED GY IP 250 OP 250 PS 637: Performed by: INTERNAL MEDICINE

## 2022-06-21 PROCEDURE — 99291 CRITICAL CARE FIRST HOUR: CPT | Mod: GC | Performed by: INTERNAL MEDICINE

## 2022-06-21 PROCEDURE — 999N000045 HC STATISTIC DAILY SWAN MONITORING

## 2022-06-21 PROCEDURE — 85520 HEPARIN ASSAY: CPT | Performed by: INTERNAL MEDICINE

## 2022-06-21 PROCEDURE — 999N000075 HC STATISTIC IABP MONITORING

## 2022-06-21 PROCEDURE — 999N000155 HC STATISTIC RAPCV CVP MONITORING

## 2022-06-21 PROCEDURE — 250N000011 HC RX IP 250 OP 636

## 2022-06-21 PROCEDURE — 250N000011 HC RX IP 250 OP 636: Performed by: STUDENT IN AN ORGANIZED HEALTH CARE EDUCATION/TRAINING PROGRAM

## 2022-06-21 PROCEDURE — 80053 COMPREHEN METABOLIC PANEL: CPT | Performed by: STUDENT IN AN ORGANIZED HEALTH CARE EDUCATION/TRAINING PROGRAM

## 2022-06-21 RX ORDER — AMOXICILLIN 250 MG
3 CAPSULE ORAL 2 TIMES DAILY
Status: DISCONTINUED | OUTPATIENT
Start: 2022-06-21 | End: 2022-06-29

## 2022-06-21 RX ORDER — POLYETHYLENE GLYCOL 3350 17 G/17G
17 POWDER, FOR SOLUTION ORAL 3 TIMES DAILY
Status: DISCONTINUED | OUTPATIENT
Start: 2022-06-21 | End: 2022-06-29

## 2022-06-21 RX ADMIN — HEPARIN SODIUM 1100 UNITS/HR: 10000 INJECTION, SOLUTION INTRAVENOUS at 03:48

## 2022-06-21 RX ADMIN — HYDROXYCHLOROQUINE SULFATE 200 MG: 200 TABLET, FILM COATED ORAL at 19:42

## 2022-06-21 RX ADMIN — THIAMINE HCL TAB 100 MG 100 MG: 100 TAB at 07:52

## 2022-06-21 RX ADMIN — ISOSORBIDE DINITRATE 20 MG: 10 TABLET ORAL at 07:52

## 2022-06-21 RX ADMIN — Medication 10 MG: at 22:04

## 2022-06-21 RX ADMIN — ISOSORBIDE DINITRATE 20 MG: 10 TABLET ORAL at 12:14

## 2022-06-21 RX ADMIN — Medication 325 MG: at 12:14

## 2022-06-21 RX ADMIN — ACETAMINOPHEN 325 MG: 325 TABLET, FILM COATED ORAL at 22:04

## 2022-06-21 RX ADMIN — SENNOSIDES AND DOCUSATE SODIUM 1 TABLET: 8.6; 5 TABLET ORAL at 19:42

## 2022-06-21 RX ADMIN — DOBUTAMINE HYDROCHLORIDE 5 MCG/KG/MIN: 200 INJECTION INTRAVENOUS at 03:47

## 2022-06-21 RX ADMIN — ISOSORBIDE DINITRATE 20 MG: 10 TABLET ORAL at 18:17

## 2022-06-21 RX ADMIN — ASPIRIN 81 MG CHEWABLE TABLET 81 MG: 81 TABLET CHEWABLE at 07:52

## 2022-06-21 RX ADMIN — HYDRALAZINE HYDROCHLORIDE 50 MG: 50 TABLET, FILM COATED ORAL at 16:03

## 2022-06-21 RX ADMIN — HYDRALAZINE HYDROCHLORIDE 50 MG: 50 TABLET, FILM COATED ORAL at 07:53

## 2022-06-21 RX ADMIN — ACETAMINOPHEN 325 MG: 325 TABLET, FILM COATED ORAL at 03:47

## 2022-06-21 RX ADMIN — TRAZODONE HYDROCHLORIDE 100 MG: 50 TABLET ORAL at 22:04

## 2022-06-21 RX ADMIN — FLUTICASONE FUROATE AND VILANTEROL TRIFENATATE 1 PUFF: 100; 25 POWDER RESPIRATORY (INHALATION) at 08:04

## 2022-06-21 RX ADMIN — HYDRALAZINE HYDROCHLORIDE 50 MG: 50 TABLET, FILM COATED ORAL at 22:46

## 2022-06-21 RX ADMIN — HYDROXYCHLOROQUINE SULFATE 200 MG: 200 TABLET, FILM COATED ORAL at 07:53

## 2022-06-21 RX ADMIN — LIDOCAINE PATCH 4% 2 PATCH: 40 PATCH TOPICAL at 19:43

## 2022-06-21 RX ADMIN — TAMSULOSIN HYDROCHLORIDE 0.4 MG: 0.4 CAPSULE ORAL at 07:52

## 2022-06-21 RX ADMIN — DULOXETINE 30 MG: 30 CAPSULE, DELAYED RELEASE ORAL at 07:52

## 2022-06-21 ASSESSMENT — ACTIVITIES OF DAILY LIVING (ADL)
ADLS_ACUITY_SCORE: 29

## 2022-06-21 NOTE — PROGRESS NOTES
D: Pt was presented to the committee today with an urgent huddle. Pt approved for heart transplant.  I/A: Called patient to check in. Pt was already aware of decision. Pt denied further questions at this time. Pt still needs to have transplant class.  Pt stated that his daughter is coming to hospital tomorrow and his daughter will be his caregiver. Reviewed transplant forms with patient over phone. Pt signed forms.Called daughter to coordinate class. Will plan to have transplant class tomorrow morning with daughter via telephone since she is coming later in the day. Transplant coordinator available for ongoing education for patient and family.   P: Will list status 2 today.

## 2022-06-21 NOTE — PLAN OF CARE
Major Shift Events: Pt A&Ox4. Flat effect. Acknowledges the plan of care. NSR with permanent pacemaker (rarely paced) Lungs clear in all lung fields. Appropriate Urine Output (350mL). Last BM 6/18/2022. BM Regimen placed. Pt refused BM regimen meds. Dobutamine titrated down to 2.5 from 5.0. Pushing oral fluids. Adequate appetite.   Plan: Planning to have subclavian IABP tomorrow. Encourage oral fluids and nutrition.

## 2022-06-21 NOTE — PROGRESS NOTES
Transplant Teaching 6/21/22    Writer met with patient and daughter Asha via telephone,  to complete heart transplant teaching. We reviewed the candidate selection process, insurance prior authorization, UNOS priority statuses, the waiting period, donor issues, and the pre-, franklin-, and post-op periods. We also reviewed the major risks of transplantation including rejection, infection and transplant vasculopathy. We  discussed patient and caregiver responsibilities before and after transplant including the need for patient to identify a caregiver to be present for education and to assist patient post discharge. The patient was informed of the weight and chemical cessation policy and agrees to these requirements.      Throughout the session, the patient and daughter were attentive, eager to learn, and asked appropriate questions.  Patient/ daughter was given a copy of the UNOS brochure on Multiple Waitlisting, the  Heart Transplant brochure and current program statistics.  Patient was also encouraged to visit the informational transplant education website for further information and video classes ([email or web address]).      Gave patient/daughter the transplant office number and encouraged to call with future questions or concerns.

## 2022-06-21 NOTE — PLAN OF CARE
Major Shift Events:  No major changes overnight. Pt requested more sleeping medication, trazodone increased. K replaced once overnight. No issues with IABP.  No contact with family overnight.   Plan: Continue current POC  For vital signs and complete assessments, please see documentation flowsheets.

## 2022-06-22 ENCOUNTER — APPOINTMENT (OUTPATIENT)
Dept: GENERAL RADIOLOGY | Facility: CLINIC | Age: 62
DRG: 001 | End: 2022-06-22
Attending: INTERNAL MEDICINE
Payer: COMMERCIAL

## 2022-06-22 ENCOUNTER — ANCILLARY PROCEDURE (OUTPATIENT)
Dept: CARDIOLOGY | Facility: CLINIC | Age: 62
DRG: 001 | End: 2022-06-22
Payer: COMMERCIAL

## 2022-06-22 ENCOUNTER — APPOINTMENT (OUTPATIENT)
Dept: ULTRASOUND IMAGING | Facility: CLINIC | Age: 62
DRG: 001 | End: 2022-06-22
Attending: INTERNAL MEDICINE
Payer: COMMERCIAL

## 2022-06-22 LAB
ABO/RH(D): NORMAL
ALBUMIN SERPL BCG-MCNC: 2.9 G/DL (ref 3.5–5.2)
ALBUMIN SERPL BCG-MCNC: 3.1 G/DL (ref 3.5–5.2)
ALBUMIN SERPL BCG-MCNC: 3.2 G/DL (ref 3.5–5.2)
ALP SERPL-CCNC: 101 U/L (ref 40–129)
ALP SERPL-CCNC: 111 U/L (ref 40–129)
ALP SERPL-CCNC: 93 U/L (ref 40–129)
ALT SERPL W P-5'-P-CCNC: 444 U/L (ref 10–50)
ALT SERPL W P-5'-P-CCNC: 460 U/L (ref 10–50)
ALT SERPL W P-5'-P-CCNC: 490 U/L (ref 10–50)
ANION GAP SERPL CALCULATED.3IONS-SCNC: 11 MMOL/L (ref 7–15)
ANION GAP SERPL CALCULATED.3IONS-SCNC: 6 MMOL/L (ref 7–15)
ANION GAP SERPL CALCULATED.3IONS-SCNC: 8 MMOL/L (ref 7–15)
ANTIBODY SCREEN: NEGATIVE
AST SERPL W P-5'-P-CCNC: 105 U/L (ref 10–50)
AST SERPL W P-5'-P-CCNC: 124 U/L (ref 10–50)
AST SERPL W P-5'-P-CCNC: 90 U/L (ref 10–50)
BASE EXCESS BLDV CALC-SCNC: -1.6 MMOL/L (ref -7.7–1.9)
BASE EXCESS BLDV CALC-SCNC: -2.2 MMOL/L (ref -7.7–1.9)
BASE EXCESS BLDV CALC-SCNC: -2.8 MMOL/L (ref -7.7–1.9)
BASE EXCESS BLDV CALC-SCNC: -3.5 MMOL/L (ref -7.7–1.9)
BILIRUB SERPL-MCNC: 0.5 MG/DL
BILIRUB SERPL-MCNC: 0.6 MG/DL
BILIRUB SERPL-MCNC: 0.6 MG/DL
BUN SERPL-MCNC: 10.2 MG/DL (ref 8–23)
BUN SERPL-MCNC: 12.6 MG/DL (ref 8–23)
BUN SERPL-MCNC: 12.7 MG/DL (ref 8–23)
CALCIUM SERPL-MCNC: 8.4 MG/DL (ref 8.8–10.2)
CALCIUM SERPL-MCNC: 8.7 MG/DL (ref 8.8–10.2)
CALCIUM SERPL-MCNC: 9.2 MG/DL (ref 8.8–10.2)
CHLORIDE SERPL-SCNC: 102 MMOL/L (ref 98–107)
CHLORIDE SERPL-SCNC: 105 MMOL/L (ref 98–107)
CHLORIDE SERPL-SCNC: 108 MMOL/L (ref 98–107)
CREAT SERPL-MCNC: 0.71 MG/DL (ref 0.67–1.17)
CREAT SERPL-MCNC: 0.72 MG/DL (ref 0.67–1.17)
CREAT SERPL-MCNC: 0.73 MG/DL (ref 0.67–1.17)
DEPRECATED HCO3 PLAS-SCNC: 21 MMOL/L (ref 22–29)
DEPRECATED HCO3 PLAS-SCNC: 22 MMOL/L (ref 22–29)
DEPRECATED HCO3 PLAS-SCNC: 23 MMOL/L (ref 22–29)
ERYTHROCYTE [DISTWIDTH] IN BLOOD BY AUTOMATED COUNT: 18.3 % (ref 10–15)
ERYTHROCYTE [DISTWIDTH] IN BLOOD BY AUTOMATED COUNT: 18.5 % (ref 10–15)
GFR SERPL CREATININE-BSD FRML MDRD: >90 ML/MIN/1.73M2
GLUCOSE SERPL-MCNC: 114 MG/DL (ref 70–99)
GLUCOSE SERPL-MCNC: 123 MG/DL (ref 70–99)
GLUCOSE SERPL-MCNC: 94 MG/DL (ref 70–99)
HCO3 BLDV-SCNC: 21 MMOL/L (ref 21–28)
HCO3 BLDV-SCNC: 22 MMOL/L (ref 21–28)
HCO3 BLDV-SCNC: 22 MMOL/L (ref 21–28)
HCO3 BLDV-SCNC: 23 MMOL/L (ref 21–28)
HCT VFR BLD AUTO: 29.5 % (ref 40–53)
HCT VFR BLD AUTO: 30.1 % (ref 40–53)
HGB BLD-MCNC: 8.9 G/DL (ref 13.3–17.7)
HGB BLD-MCNC: 9 G/DL (ref 13.3–17.7)
HOLD SPECIMEN: NORMAL
INR PPP: 1.2 (ref 0.85–1.15)
LACTATE SERPL-SCNC: 0.5 MMOL/L (ref 0.7–2)
LACTATE SERPL-SCNC: 0.6 MMOL/L (ref 0.7–2)
LACTATE SERPL-SCNC: 0.8 MMOL/L (ref 0.7–2)
LACTATE SERPL-SCNC: 1 MMOL/L (ref 0.7–2)
MAGNESIUM SERPL-MCNC: 1.9 MG/DL (ref 1.7–2.3)
MAGNESIUM SERPL-MCNC: 2 MG/DL (ref 1.7–2.3)
MCH RBC QN AUTO: 28.7 PG (ref 26.5–33)
MCH RBC QN AUTO: 29.3 PG (ref 26.5–33)
MCHC RBC AUTO-ENTMCNC: 29.9 G/DL (ref 31.5–36.5)
MCHC RBC AUTO-ENTMCNC: 30.2 G/DL (ref 31.5–36.5)
MCV RBC AUTO: 96 FL (ref 78–100)
MCV RBC AUTO: 97 FL (ref 78–100)
O2/TOTAL GAS SETTING VFR VENT: 21 %
OXYHGB MFR BLDV: 45 % (ref 70–75)
OXYHGB MFR BLDV: 49 % (ref 70–75)
OXYHGB MFR BLDV: 52 % (ref 70–75)
OXYHGB MFR BLDV: 62 % (ref 70–75)
PCO2 BLDV: 32 MM HG (ref 40–50)
PCO2 BLDV: 33 MM HG (ref 40–50)
PCO2 BLDV: 35 MM HG (ref 40–50)
PCO2 BLDV: 37 MM HG (ref 40–50)
PH BLDV: 7.39 [PH] (ref 7.32–7.43)
PH BLDV: 7.4 [PH] (ref 7.32–7.43)
PH BLDV: 7.41 [PH] (ref 7.32–7.43)
PH BLDV: 7.45 [PH] (ref 7.32–7.43)
PHOSPHATE SERPL-MCNC: 4.5 MG/DL (ref 2.5–4.5)
PLATELET # BLD AUTO: 219 10E3/UL (ref 150–450)
PLATELET # BLD AUTO: 234 10E3/UL (ref 150–450)
PO2 BLDV: 27 MM HG (ref 25–47)
PO2 BLDV: 30 MM HG (ref 25–47)
PO2 BLDV: 32 MM HG (ref 25–47)
PO2 BLDV: 36 MM HG (ref 25–47)
POTASSIUM SERPL-SCNC: 4 MMOL/L (ref 3.4–4.5)
POTASSIUM SERPL-SCNC: 4.3 MMOL/L (ref 3.4–4.5)
POTASSIUM SERPL-SCNC: 4.3 MMOL/L (ref 3.4–4.5)
PROT SERPL-MCNC: 5.4 G/DL (ref 6.4–8.3)
PROT SERPL-MCNC: 5.8 G/DL (ref 6.4–8.3)
PROT SERPL-MCNC: 6.1 G/DL (ref 6.4–8.3)
RBC # BLD AUTO: 3.04 10E6/UL (ref 4.4–5.9)
RBC # BLD AUTO: 3.14 10E6/UL (ref 4.4–5.9)
SA 1 CELL: NORMAL
SA 1 TEST METHOD: NORMAL
SA 2 CELL: NORMAL
SA 2 TEST METHOD: NORMAL
SA1 HI RISK ABY: NORMAL
SA1 MOD RISK ABY: NORMAL
SA2 HI RISK ABY: NORMAL
SA2 MOD RISK ABY: NORMAL
SODIUM SERPL-SCNC: 131 MMOL/L (ref 136–145)
SODIUM SERPL-SCNC: 137 MMOL/L (ref 136–145)
SODIUM SERPL-SCNC: 138 MMOL/L (ref 136–145)
SPECIMEN EXPIRATION DATE: NORMAL
UFH PPP CHRO-ACNC: 0.26 IU/ML
UNACCEPTABLE ANTIGENS: NORMAL
UNOS CPRA: 30
WBC # BLD AUTO: 5.3 10E3/UL (ref 4–11)
WBC # BLD AUTO: 6.3 10E3/UL (ref 4–11)
ZZZSA 1  COMMENTS: NORMAL
ZZZSA 2 COMMENTS: NORMAL

## 2022-06-22 PROCEDURE — 84155 ASSAY OF PROTEIN SERUM: CPT | Performed by: STUDENT IN AN ORGANIZED HEALTH CARE EDUCATION/TRAINING PROGRAM

## 2022-06-22 PROCEDURE — 272N000004 HC RX 272: Performed by: STUDENT IN AN ORGANIZED HEALTH CARE EDUCATION/TRAINING PROGRAM

## 2022-06-22 PROCEDURE — 999N000075 HC STATISTIC IABP MONITORING

## 2022-06-22 PROCEDURE — 86901 BLOOD TYPING SEROLOGIC RH(D): CPT | Performed by: THORACIC SURGERY (CARDIOTHORACIC VASCULAR SURGERY)

## 2022-06-22 PROCEDURE — 71045 X-RAY EXAM CHEST 1 VIEW: CPT | Mod: 26 | Performed by: RADIOLOGY

## 2022-06-22 PROCEDURE — 250N000013 HC RX MED GY IP 250 OP 250 PS 637: Performed by: STUDENT IN AN ORGANIZED HEALTH CARE EDUCATION/TRAINING PROGRAM

## 2022-06-22 PROCEDURE — 250N000013 HC RX MED GY IP 250 OP 250 PS 637: Performed by: INTERNAL MEDICINE

## 2022-06-22 PROCEDURE — 93930 UPPER EXTREMITY STUDY: CPT | Mod: 26 | Performed by: RADIOLOGY

## 2022-06-22 PROCEDURE — 85027 COMPLETE CBC AUTOMATED: CPT

## 2022-06-22 PROCEDURE — 83735 ASSAY OF MAGNESIUM: CPT | Performed by: INTERNAL MEDICINE

## 2022-06-22 PROCEDURE — 83605 ASSAY OF LACTIC ACID: CPT

## 2022-06-22 PROCEDURE — 82805 BLOOD GASES W/O2 SATURATION: CPT

## 2022-06-22 PROCEDURE — 258N000003 HC RX IP 258 OP 636: Performed by: INTERNAL MEDICINE

## 2022-06-22 PROCEDURE — 250N000011 HC RX IP 250 OP 636: Performed by: INTERNAL MEDICINE

## 2022-06-22 PROCEDURE — 250N000013 HC RX MED GY IP 250 OP 250 PS 637

## 2022-06-22 PROCEDURE — 85610 PROTHROMBIN TIME: CPT | Performed by: INTERNAL MEDICINE

## 2022-06-22 PROCEDURE — 86850 RBC ANTIBODY SCREEN: CPT | Performed by: THORACIC SURGERY (CARDIOTHORACIC VASCULAR SURGERY)

## 2022-06-22 PROCEDURE — 93283 PRGRMG EVAL IMPLANTABLE DFB: CPT | Mod: 26 | Performed by: INTERNAL MEDICINE

## 2022-06-22 PROCEDURE — 93930 UPPER EXTREMITY STUDY: CPT

## 2022-06-22 PROCEDURE — 80053 COMPREHEN METABOLIC PANEL: CPT | Performed by: STUDENT IN AN ORGANIZED HEALTH CARE EDUCATION/TRAINING PROGRAM

## 2022-06-22 PROCEDURE — 99291 CRITICAL CARE FIRST HOUR: CPT | Mod: 25 | Performed by: INTERNAL MEDICINE

## 2022-06-22 PROCEDURE — 84100 ASSAY OF PHOSPHORUS: CPT | Performed by: INTERNAL MEDICINE

## 2022-06-22 PROCEDURE — 250N000009 HC RX 250: Performed by: INTERNAL MEDICINE

## 2022-06-22 PROCEDURE — 200N000002 HC R&B ICU UMMC

## 2022-06-22 PROCEDURE — 85027 COMPLETE CBC AUTOMATED: CPT | Performed by: INTERNAL MEDICINE

## 2022-06-22 PROCEDURE — 85520 HEPARIN ASSAY: CPT | Performed by: INTERNAL MEDICINE

## 2022-06-22 PROCEDURE — 71045 X-RAY EXAM CHEST 1 VIEW: CPT

## 2022-06-22 PROCEDURE — 93283 PRGRMG EVAL IMPLANTABLE DFB: CPT

## 2022-06-22 RX ORDER — OXYMETAZOLINE HYDROCHLORIDE 0.05 G/100ML
2 SPRAY NASAL 2 TIMES DAILY
Status: DISCONTINUED | OUTPATIENT
Start: 2022-06-22 | End: 2022-06-25

## 2022-06-22 RX ORDER — MAGNESIUM SULFATE HEPTAHYDRATE 40 MG/ML
2 INJECTION, SOLUTION INTRAVENOUS ONCE
Status: COMPLETED | OUTPATIENT
Start: 2022-06-22 | End: 2022-06-22

## 2022-06-22 RX ORDER — ONDANSETRON 2 MG/ML
4 INJECTION INTRAMUSCULAR; INTRAVENOUS EVERY 6 HOURS PRN
Status: COMPLETED | OUTPATIENT
Start: 2022-06-22 | End: 2022-06-28

## 2022-06-22 RX ADMIN — SENNOSIDES AND DOCUSATE SODIUM 3 TABLET: 8.6; 5 TABLET ORAL at 08:39

## 2022-06-22 RX ADMIN — THROMBIN HUMAN 1 KIT: 2000 POWDER, FOR SOLUTION TOPICAL at 22:15

## 2022-06-22 RX ADMIN — ONDANSETRON 4 MG: 2 INJECTION INTRAMUSCULAR; INTRAVENOUS at 20:52

## 2022-06-22 RX ADMIN — HYDROXYCHLOROQUINE SULFATE 200 MG: 200 TABLET, FILM COATED ORAL at 20:04

## 2022-06-22 RX ADMIN — ISOSORBIDE DINITRATE 20 MG: 10 TABLET ORAL at 17:43

## 2022-06-22 RX ADMIN — TAMSULOSIN HYDROCHLORIDE 0.4 MG: 0.4 CAPSULE ORAL at 08:39

## 2022-06-22 RX ADMIN — HYDRALAZINE HYDROCHLORIDE 50 MG: 50 TABLET, FILM COATED ORAL at 23:40

## 2022-06-22 RX ADMIN — Medication 325 MG: at 12:18

## 2022-06-22 RX ADMIN — Medication 10 MG: at 23:42

## 2022-06-22 RX ADMIN — THIAMINE HCL TAB 100 MG 100 MG: 100 TAB at 08:39

## 2022-06-22 RX ADMIN — MAGNESIUM SULFATE IN WATER 2 G: 40 INJECTION, SOLUTION INTRAVENOUS at 18:28

## 2022-06-22 RX ADMIN — LIDOCAINE PATCH 4% 2 PATCH: 40 PATCH TOPICAL at 20:05

## 2022-06-22 RX ADMIN — ISOSORBIDE DINITRATE 20 MG: 10 TABLET ORAL at 08:39

## 2022-06-22 RX ADMIN — HYDROXYCHLOROQUINE SULFATE 200 MG: 200 TABLET, FILM COATED ORAL at 08:39

## 2022-06-22 RX ADMIN — ASPIRIN 81 MG CHEWABLE TABLET 81 MG: 81 TABLET CHEWABLE at 08:39

## 2022-06-22 RX ADMIN — SODIUM NITROPRUSSIDE 0.25 MCG/KG/MIN: 25 INJECTION, SOLUTION, CONCENTRATE INTRAVENOUS at 23:28

## 2022-06-22 RX ADMIN — DULOXETINE 30 MG: 30 CAPSULE, DELAYED RELEASE ORAL at 08:39

## 2022-06-22 RX ADMIN — FLUTICASONE FUROATE AND VILANTEROL TRIFENATATE 1 PUFF: 100; 25 POWDER RESPIRATORY (INHALATION) at 08:40

## 2022-06-22 RX ADMIN — PROCHLORPERAZINE EDISYLATE 5 MG: 5 INJECTION INTRAMUSCULAR; INTRAVENOUS at 22:31

## 2022-06-22 RX ADMIN — HYDRALAZINE HYDROCHLORIDE 50 MG: 50 TABLET, FILM COATED ORAL at 08:39

## 2022-06-22 RX ADMIN — HYDRALAZINE HYDROCHLORIDE 50 MG: 50 TABLET, FILM COATED ORAL at 15:39

## 2022-06-22 RX ADMIN — POLYETHYLENE GLYCOL 3350 17 G: 17 POWDER, FOR SOLUTION ORAL at 08:40

## 2022-06-22 RX ADMIN — OXYMETAZOLINE HYDROCHLORIDE 2 SPRAY: 0.05 SPRAY NASAL at 21:50

## 2022-06-22 RX ADMIN — ISOSORBIDE DINITRATE 20 MG: 10 TABLET ORAL at 12:18

## 2022-06-22 RX ADMIN — TRAZODONE HYDROCHLORIDE 100 MG: 50 TABLET ORAL at 23:40

## 2022-06-22 ASSESSMENT — ACTIVITIES OF DAILY LIVING (ADL)
ADLS_ACUITY_SCORE: 32
ADLS_ACUITY_SCORE: 32
ADLS_ACUITY_SCORE: 29
ADLS_ACUITY_SCORE: 32
ADLS_ACUITY_SCORE: 29
ADLS_ACUITY_SCORE: 32

## 2022-06-22 NOTE — PLAN OF CARE
Major Shift Events:  A&Ox4; makes needs known.  Denies pain.  SR with 1st degree AV block and BBB 70s-80s.  20 beat run of SVT around 0800; MD notified.  IABP 1:1 100% with no alarms.  MAPs 70s-80s.  Dobutamine weaned off at 1000; CI now 2.1-2.3, SVR 6038-1459, and SVO2 49-52 - MD notified; continue with dobutamine off.  CVP 5-6, PAP 36-40/20-21, and PAOP 16 when done by MD at 0800.  [place] now at 48 (previously 46) - MD notified; per MD no need for repositioning.  Bowel regimen given this AM for constipation, pt had 3 large watery/loose stools after.  Heparin therapeutic at 1100.    Plan: Subclavian IABP placement tomorrow AM at 0730.  NPO at midnight and stop heparin at 0300.    For vital signs and complete assessments, please see documentation flowsheets.

## 2022-06-22 NOTE — PROGRESS NOTES
"Cardiology Progress Note       24 Hour events:  - Listed for heart transplant yesterday as status 2  - diuresis held due to low CVP yesterday, remained net negative      Physical Exam   Temp: 97.5  F (36.4  C) Temp src: Axillary BP: 115/65 Pulse: 86   Resp: 18 SpO2: 96 % O2 Device: None (Room air)      Vital Signs with Ranges  Temp:  [97.2  F (36.2  C)-97.9  F (36.6  C)] 97.5  F (36.4  C)  Pulse:  [82-94] 86  Resp:  [10-24] 18  BP: ()/(55-83) 115/65  SpO2:  [91 %-97 %] 96 %    Intake/Output        Intake/Output Summary (Last 24 hours) at 6/21/2022 2145  Last data filed at 6/21/2022 2000  Gross per 24 hour   Intake 1455.89 ml   Output 1125 ml   Net 330.89 ml             Weight  Vitals:    06/17/22 1736 06/18/22 0400 06/20/22 0000 06/21/22 0400   Weight: 67.4 kg (148 lb 9.4 oz) 68.5 kg (151 lb 0.2 oz) 67.9 kg (149 lb 11.1 oz) 66.8 kg (147 lb 4.3 oz)       Resp: 18        DOBUTamine 2.5 mcg/kg/min (06/21/22 2000)     heparin 1,100 Units/hr (06/21/22 2000)         aspirin  81 mg Oral Daily     ceFAZolin  2 g Intravenous Pre-Op/Pre-procedure x 1 dose     DULoxetine  30 mg Oral Daily     ferrous sulfate  325 mg Oral Daily with lunch     fluticasone-vilanterol  1 puff Inhalation Daily     [Held by provider] furosemide  80 mg Intravenous BID     hydrALAZINE  50 mg Oral Q8H CAMMY     hydroxychloroquine  200 mg Oral BID     isosorbide dinitrate  20 mg Oral TID     lidocaine  2 patch Transdermal Q24h    And     lidocaine   Transdermal Q8H CAMMY     magnesium sulfate  2 g Intravenous Once     polyethylene glycol  17 g Oral TID     [Held by provider] potassium chloride  20 mEq Oral BID     senna-docusate  3 tablet Oral BID     tamsulosin  0.4 mg Oral Daily     thiamine  100 mg Oral Daily     traZODone  100 mg Oral At Bedtime        , Blood pressure 115/65, pulse 86, temperature 97.5  F (36.4  C), temperature source Axillary, resp. rate 18, height 1.702 m (5' 7\"), weight 66.8 kg (147 lb 4.3 oz), SpO2 96 %.  GEN:  Alert, " oriented x 3, appears comfortable, NAD.  CV:  Regular rate and rhythm   LUNGS:  Clear to auscultation bilaterally   ABD:  Active bowel sounds, soft, non-tender/non-distended.  No rebound/guarding/rigidity.  EXT:  No edema or cyanosis.      Data   Recent Labs   Lab 06/21/22  1624 06/21/22  1619 06/21/22  0952 06/21/22  0828 06/21/22  0349 06/20/22  2153 06/20/22  1551 06/20/22  0406 06/20/22  0355 06/19/22  0358 06/19/22  0357   WBC 5.0  --   --   --  5.3  --  5.3  --  6.2   < >  --    HGB 8.7*  --   --   --  8.8*  --  9.0*  --  8.4*   < >  --    MCV 96  --   --   --  95  --  94  --  95   < >  --      --   --   --  242  --  224  --  208   < >  --    INR  --   --   --   --  1.28*  --   --   --  1.36*  --  1.49*     --  138  --  137   < > 137   < > 136   < > 138   POTASSIUM 4.4  --  4.3  --  4.1   < > 3.9   < > 3.8   < > 4.0   CHLORIDE 104  --  107  --  106   < > 104   < > 105   < > 104   CO2 24  --  25  --  25   < > 26   < > 24   < > 26   BUN 11.5  --  9  --  9   < > 8   < > 9   < > 11   CR 0.81  --  0.79  --  0.61*   < > 0.64*   < > 0.75   < > 0.58*   ANIONGAP 8  --  6  --  6   < > 7   < > 7   < > 8   ELDA 9.0  --  9.0  --  8.8   < > 8.8   < > 8.7   < > 8.0*   * 120* 139*   < > 135*   < > 110*   < > 81   < > 156*   ALBUMIN 3.1*  --  2.8*  --  2.9*   < > 3.0*   < > 2.8*   < > 2.9*   PROTTOTAL 6.0*  --  6.2*  --  6.2*   < > 6.3*   < > 6.0*   < > 6.2*   BILITOTAL 0.6  --  0.8  --  1.3   < > 1.2   < > 1.3   < > 1.4*   ALKPHOS 98  --  96  --  98   < > 104   < > 98   < > 102   *  --  652*  --  719*   < > 864*   < > 944*   < > 1,342*   AST 99*  --  105*  --  125*   < > 168*   < > 238*   < > 586*    < > = values in this interval not displayed.     Recent Results (from the past 24 hour(s))   XR Chest Port 1 View    Narrative    Exam: XR CHEST PORT 1 VIEW, 6/21/2022 5:55 AM    Indication: swan-gilma catheter, femoral IABP    Comparison: 6/20/2022    Findings:   Left chest wall cardiac device with leads  intact and stable position.  Unchanged intraoperative balloon pump marker projecting over the  aortic arch. Right IJ pulmonary artery catheter tip entering the left  pulmonary artery. Coronary stent noted. Unchanged heart size. No  pneumothorax or effusions. Mildly indistinct pulmonary vasculature. No  focal consolidations.      Impression    Impression:   1. Unchanged IABP over the aortic arch and pulmonary artery catheter  tip entering the left pulmonary artery.  2. Mild interstitial edema.    I have personally reviewed the examination and initial interpretation  and I agree with the findings.    GABRIEL COURTNEY MD         SYSTEM ID:  [id number]     Medications     DOBUTamine 2.5 mcg/kg/min (06/21/22 2000)     heparin 1,100 Units/hr (06/21/22 2000)       aspirin  81 mg Oral Daily     ceFAZolin  2 g Intravenous Pre-Op/Pre-procedure x 1 dose     DULoxetine  30 mg Oral Daily     ferrous sulfate  325 mg Oral Daily with lunch     fluticasone-vilanterol  1 puff Inhalation Daily     [Held by provider] furosemide  80 mg Intravenous BID     hydrALAZINE  50 mg Oral Q8H CAMMY     hydroxychloroquine  200 mg Oral BID     isosorbide dinitrate  20 mg Oral TID     lidocaine  2 patch Transdermal Q24h    And     lidocaine   Transdermal Q8H CAMMY     magnesium sulfate  2 g Intravenous Once     polyethylene glycol  17 g Oral TID     [Held by provider] potassium chloride  20 mEq Oral BID     senna-docusate  3 tablet Oral BID     tamsulosin  0.4 mg Oral Daily     thiamine  100 mg Oral Daily     traZODone  100 mg Oral At Bedtime         Venous Blood Gas  Recent Labs   Lab 06/21/22  1624 06/21/22  0952 06/21/22  0349 06/20/22 1951   PHV 7.43 7.43 7.44* 7.46*   PCO2V 40 40 36* 37*   PO2V 32 36 37 34   HCO3V 27 26 24 26   RINA 2.0* 1.7 0.2 2.0*   O2PER 0 35 21 21       Arterial Blood Gas  Recent Labs   Lab 06/21/22  1624 06/21/22  0952 06/21/22  0349 06/20/22 1951   O2PER 0 35 21 21          Recent Results (from the past 24 hour(s))   XR Chest  Port 1 View    Narrative    Exam: XR CHEST PORT 1 VIEW, 6/21/2022 5:55 AM    Indication: swan-gilma catheter, femoral IABP    Comparison: 6/20/2022    Findings:   Left chest wall cardiac device with leads intact and stable position.  Unchanged intraoperative balloon pump marker projecting over the  aortic arch. Right IJ pulmonary artery catheter tip entering the left  pulmonary artery. Coronary stent noted. Unchanged heart size. No  pneumothorax or effusions. Mildly indistinct pulmonary vasculature. No  focal consolidations.      Impression    Impression:   1. Unchanged IABP over the aortic arch and pulmonary artery catheter  tip entering the left pulmonary artery.  2. Mild interstitial edema.    I have personally reviewed the examination and initial interpretation  and I agree with the findings.    GABRIEL COURTNEY MD         SYSTEM ID:  [id number]       Blood culture:  No results found for this or any previous visit.   Urine culture:  Results for orders placed or performed during the hospital encounter of 05/16/22   Urine Culture Aerobic Bacterial    Specimen: Urine, Midstream   Result Value Ref Range    Culture No Growth        Assessment & Plan    61-year-old man with history of ischemic cardiomyopathy LVEF 15%) NYHA class IV symptoms ACC stage D presenting with multi-organ failure (JOSE; Acute liver injury/shock liver) after multiple admissions for heart failure over the past several months for evaluation of advanced therapies.    Changes Today:  - continued to hold diuresis  - decreased dobutamine to 2.5  - plan for subclavian iABP on Thursday 6/23  - upper extremity ultrasounds for iABP sizing      CV:   #Ambulatory cardiogenic shock SCAI D  #Acute on chronic systolic heart failure  #Advanced ischemic cardiomyopathy, Class IV, Stage D  # SVT  # Nonsustained VT  # CAD s/p PCI to LAD (2005)  # Hx of MI (2007)  # Severe ostial LAD disease with in-stent restenosis of mid LAD at diag bifurcation, severe proximal RCA  disease, mild circ disease now s/p ostial LAD and proximal RCA lithotripsy and stenting on 5/24/22  # S/p CRT-D ([employer] 2006, gen change 2012)  CMRI showing infarcted myocardium in LAD territory. Given the degree of LV dysfunction/dilation, moderate to severe functional MR and lack of viable myocardium, cardiac surgery would be high risk. Now s/p ostial LAD and proximal RCA lithotripsy and stenting on 5/24/22.   - ASA and Coumadin (on hold) continue heparin gtt   - ASA 81 mg until 6/22 given stenting on 5/22      Plan:  --Inotropes: Dobutamine 7.5 --> decrease to 5.0 --> 2.5  --Goal CVP 8-10  --Continue iABP  --hydral 50 mg Q8H  --isordil 20mg TID   --Anticoagulation: hep gtt once iABP in place  --Antiplatelet: Continue ASA-81mg  -- Statin: hold statin   -- BMP BID, K>4 & Mg>2  -- Strict I/Os: Goal even to positive for low CVP  -- Daily weight    Pulmonary  # Mild-Moderate Emphysema  # Hx of COVID-19  Appreciate pulmonary consultation given emphysema  PFTs  Former smoker 2 ppd for 40 years quit on 09/09/09.     Renal:  -Strict I&O while diuresing   -Monitor Electrolytes while actively diuresing K->4 Mg->2    Gastroenterology:  # Severe malnutrition in the context of chronic illness  # Liver injury, in the setting of cardiogenic shock  # GERD  - Diuresis as above  -Supplements to help maintain nutrition.        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  # Insomnia  - PTA lorazepam 0.5-1 mg q6h PRN (pta q4h prn)  - PTA zolpidem 5mg at bedtime prn  - Hydroxyzine prn for anxiety  - Melatonin scheduled   - cymbalta 30mg; continue anxiety PRNs while in the hospital     Endocrine: TSH WNL HBA1C 5.5   -Continue to monitor FS while in ICU, Insulin GTT as needed    Hemathology:  # Hx of DVT and PE  #APL  # Anemia, chronic  - Continue pta ferrous sulfate 325mg daily        ID:  [id number]    Rheumatology  #Lupus  # APL  - Continue pta hydroxychloroquine 200mg bid   - PTA mycophenolic acid 1500mg q12h on hold in  anticipation for LVAD  -- Would plan to hold MMF one week prior to surgery and re-starting 5-7 days after surgery in the absence of surgical site infection, poor wound healing or infection elsewhere.      - Continue pta tamsulosin 0.4mg daily    Diet: Low salt diet <2 g/24 hours  DVT Prophylaxis: Heparin GTT  Fitzgerald Catheter: In place  Code Status: Full   Lines: PICC Line    LVAD/Transplant Evaluation Checklist  [x]?????? labs (CBC, CMP, PT/INR, cystatin C, prealbumin, UA + micro)  [x]?????? Infectious (Hep A/B/C, HIV, treponema, HSV 1/2 IgG, CMV IgG, Toxo IgG, EBV IgG, varicella IgG, Quant gold, COVID vaccine/PCR)  [x]?????? Utox/nicotine and cotinine/PeTH   [x]?????? Immunocompatibility (last transfusion, ABO, HLA tissue typing, PRA)  [x]?????? ECG, Echo  [x]?????? CPX - can be oupatient  [x]?????? 6MWT - can be outpatient  [x]?????? RHC, completed but needs repeat in 2-3 months to assess PVR  [x]?????? CVTS consult  [x]?????? Social work consult  [x]??????  Palliative care consult: final note pending  [x]?????? Neuropsych consult: order placed: final note pending  [x]?????? Nutrition consult  [x]?????? CT Dental   [x]?????? Dental consult  [x]?????? Abd US + doppler  - Abd US complete done, not with doppler; OK given unremarkable CT CAP  [x]?????? Extremity US and ABIs  [x]?????? Carotid US (if DM or ICM or >51yo)  [x]?????? PFTs: outpatient  [x]?????? Dexa: outpatient  [x]?????? CT head non-contrast  [x]?????? CT CAP non-contrast  [x]?????? Colonoscopy (>49 yo)  [x]?????? PSA/HCG      This patient was seen and discussed with Dr. Geno MD who agrees with the above assessment and plan.     Juancho Galan MD, MSc  Cardiovascular Disease Fellow  [facility]

## 2022-06-22 NOTE — PROGRESS NOTES
"Cardiology Progress Note       24 Hour events:  - Listed for heart transplant yesterday as status 2  - diuresis held due to low CVP yesterday, remained net negative  - run of NSVT this morning      Physical Exam   Temp: 97.7  F (36.5  C) Temp src: Axillary BP: (!) 120/90 Pulse: 89   Resp: 16 SpO2: 96 % O2 Device: None (Room air)      Vital Signs with Ranges  Temp:  [97.5  F (36.4  C)-98.2  F (36.8  C)] 97.7  F (36.5  C)  Pulse:  [83-98] 89  Resp:  [10-24] 16  BP: ()/(55-90) 120/90  SpO2:  [93 %-97 %] 96 %    Intake/Output      Intake/Output Summary (Last 24 hours) at 6/22/2022 1410  Last data filed at 6/22/2022 1300  Gross per 24 hour   Intake 1353.6 ml   Output 1175 ml   Net 178.6 ml         Weight  Vitals:    06/17/22 1736 06/18/22 0400 06/20/22 0000 06/21/22 0400   Weight: 67.4 kg (148 lb 9.4 oz) 68.5 kg (151 lb 0.2 oz) 67.9 kg (149 lb 11.1 oz) 66.8 kg (147 lb 4.3 oz)       Resp: 16        DOBUTamine 2.5 mcg/kg/min (06/22/22 0700)     heparin 1,100 Units/hr (06/22/22 0700)         aspirin  81 mg Oral Daily     ceFAZolin  2 g Intravenous Pre-Op/Pre-procedure x 1 dose     DULoxetine  30 mg Oral Daily     ferrous sulfate  325 mg Oral Daily with lunch     fluticasone-vilanterol  1 puff Inhalation Daily     [Held by provider] furosemide  80 mg Intravenous BID     hydrALAZINE  50 mg Oral Q8H CAMMY     hydroxychloroquine  200 mg Oral BID     isosorbide dinitrate  20 mg Oral TID     lidocaine  2 patch Transdermal Q24h    And     lidocaine   Transdermal Q8H CAMMY     magnesium sulfate  2 g Intravenous Once     polyethylene glycol  17 g Oral TID     [Held by provider] potassium chloride  20 mEq Oral BID     senna-docusate  3 tablet Oral BID     tamsulosin  0.4 mg Oral Daily     thiamine  100 mg Oral Daily     traZODone  100 mg Oral At Bedtime        , Blood pressure 113/67, pulse 84, temperature 97.6  F (36.4  C), temperature source Axillary, resp. rate 20, height 1.702 m (5' 7\"), weight 66.8 kg (147 lb 4.3 oz), SpO2 96 " %.  GEN:  Alert, oriented x 3, appears comfortable, NAD.  CV:  Regular rate and rhythm   LUNGS:  Clear to auscultation bilaterally   ABD:  Active bowel sounds, soft, non-tender/non-distended.  No rebound/guarding/rigidity.  EXT:  No edema or cyanosis.      Data   Recent Labs   Lab 06/22/22  0520 06/21/22  2205 06/21/22  1624 06/21/22  1619 06/21/22  0952 06/21/22  0828 06/21/22  0349 06/20/22  2153 06/20/22  1551 06/20/22  0406 06/20/22  0355   WBC  --   --  5.0  --   --   --  5.3  --  5.3  --  6.2   HGB  --   --  8.7*  --   --   --  8.8*  --  9.0*  --  8.4*   MCV  --   --  96  --   --   --  95  --  94  --  95   PLT  --   --  209  --   --   --  242  --  224  --  208   INR 1.20*  --   --   --   --   --  1.28*  --   --   --  1.36*   NA  --  131* 136  --  138  --  137   < > 137   < > 136   POTASSIUM  --  4.0 4.4  --  4.3  --  4.1   < > 3.9   < > 3.8   CHLORIDE  --  102 104  --  107  --  106   < > 104   < > 105   CO2  --  23 24  --  25  --  25   < > 26   < > 24   BUN  --  12.7 11.5  --  9  --  9   < > 8   < > 9   CR  --  0.72 0.81  --  0.79  --  0.61*   < > 0.64*   < > 0.75   ANIONGAP  --  6* 8  --  6  --  6   < > 7   < > 7   ELDA  --  8.4* 9.0  --  9.0  --  8.8   < > 8.8   < > 8.7   GLC  --  114* 116* 120* 139*   < > 135*   < > 110*   < > 81   ALBUMIN  --  2.9* 3.1*  --  2.8*  --  2.9*   < > 3.0*   < > 2.8*   PROTTOTAL  --  5.4* 6.0*  --  6.2*  --  6.2*   < > 6.3*   < > 6.0*   BILITOTAL  --  0.5 0.6  --  0.8  --  1.3   < > 1.2   < > 1.3   ALKPHOS  --  93 98  --  96  --  98   < > 104   < > 98   ALT  --  490* 569*  --  652*  --  719*   < > 864*   < > 944*   AST  --  90* 99*  --  105*  --  125*   < > 168*   < > 238*    < > = values in this interval not displayed.     No results found for this or any previous visit (from the past 24 hour(s)).  Medications     DOBUTamine 2.5 mcg/kg/min (06/22/22 0700)     heparin 1,100 Units/hr (06/22/22 0700)       aspirin  81 mg Oral Daily     ceFAZolin  2 g Intravenous  Pre-Op/Pre-procedure x 1 dose     DULoxetine  30 mg Oral Daily     ferrous sulfate  325 mg Oral Daily with lunch     fluticasone-vilanterol  1 puff Inhalation Daily     [Held by provider] furosemide  80 mg Intravenous BID     hydrALAZINE  50 mg Oral Q8H CAMMY     hydroxychloroquine  200 mg Oral BID     isosorbide dinitrate  20 mg Oral TID     lidocaine  2 patch Transdermal Q24h    And     lidocaine   Transdermal Q8H CAMMY     magnesium sulfate  2 g Intravenous Once     polyethylene glycol  17 g Oral TID     [Held by provider] potassium chloride  20 mEq Oral BID     senna-docusate  3 tablet Oral BID     tamsulosin  0.4 mg Oral Daily     thiamine  100 mg Oral Daily     traZODone  100 mg Oral At Bedtime         Venous Blood Gas  Recent Labs   Lab 06/21/22  2205 06/21/22  1624 06/21/22  0952 06/21/22  0349   PHV 7.44* 7.43 7.43 7.44*   PCO2V 39* 40 40 36*   PO2V 32 32 36 37   HCO3V 26 27 26 24   RINA 1.9 2.0* 1.7 0.2   O2PER 21 0 35 21       Arterial Blood Gas  Recent Labs   Lab 06/21/22  2205 06/21/22  1624 06/21/22  0952 06/21/22  0349   O2PER 21 0 35 21          No results found for this or any previous visit (from the past 24 hour(s)).    Blood culture:  No results found for this or any previous visit.   Urine culture:  Results for orders placed or performed during the hospital encounter of 05/16/22   Urine Culture Aerobic Bacterial    Specimen: Urine, Midstream   Result Value Ref Range    Culture No Growth        Assessment & Plan    61-year-old man with history of ischemic cardiomyopathy LVEF 15%) NYHA class IV symptoms ACC stage D presenting with multi-organ failure (JOSE; Acute liver injury/shock liver) after multiple admissions for heart failure over the past several months for evaluation of advanced therapies. Now listed as status 2 for OHT. Femoral iABP in place, will be transitioed to subclavian this Thursday (6/23).    Changes Today:  - continued to hold diuresis; keep euvolemic  - weaned dobutamine off  -  recheck hemodynamics off dobutamine  - plan for subclavian iABP on Thursday 6/23      CV:   #Ambulatory cardiogenic shock SCAI D  #Acute on chronic systolic heart failure  #Advanced ischemic cardiomyopathy, Class IV, Stage D  # SVT  # Nonsustained VT  # CAD s/p PCI to LAD (2005)  # Hx of MI (2007)  # Severe ostial LAD disease with in-stent restenosis of mid LAD at diag bifurcation, severe proximal RCA disease, mild circ disease now s/p ostial LAD and proximal RCA lithotripsy and stenting on 5/24/22  # S/p CRT-D ([employer] 2006, gen change 2012)  CMRI showing infarcted myocardium in LAD territory. Given the degree of LV dysfunction/dilation, moderate to severe functional MR and lack of viable myocardium, cardiac surgery would be high risk. Now s/p ostial LAD and proximal RCA lithotripsy and stenting on 5/24/22.   - ASA and Coumadin (on hold) continue heparin gtt   - ASA 81 mg until 6/22 given stenting on 5/22      Plan:  --Inotropes: Dobutamine 7.5 --> decrease to 5.0 --> 2.5 --> off (6/22/22)  --Goal CVP 8-10  --Continue iABP  --hydral 50 mg Q8H  --isordil 20mg TID   --Anticoagulation: hep gtt once iABP in place  --Antiplatelet: Continue ASA-81mg  -- Statin: hold statin   -- BMP BID, K>4 & Mg>2  -- Strict I/Os: Goal even to positive for low CVP  -- Daily weight    Pulmonary  # Mild-Moderate Emphysema  # Hx of COVID-19  Appreciate pulmonary consultation given emphysema  PFTs  Former smoker 2 ppd for 40 years quit on 09/09/09.     Renal:  -Strict I&O while diuresing   -Monitor Electrolytes while actively diuresing K->4 Mg->2    Gastroenterology:  # Severe malnutrition in the context of chronic illness  # Liver injury, in the setting of cardiogenic shock  # GERD  - Diuresis as above  -Supplements to help maintain nutrition.        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  # Insomnia  - PTA lorazepam 0.5-1 mg q6h PRN (pta q4h prn)  - PTA zolpidem 5mg at bedtime prn  - Hydroxyzine prn for anxiety  -  Melatonin scheduled   - cymbalta 30mg; continue anxiety PRNs while in the hospital     Endocrine: TSH WNL HBA1C 5.5   -Continue to monitor FS while in ICU, Insulin GTT as needed    Hemathology:  # Hx of DVT and PE  #APL  # Anemia, chronic  - Continue pta ferrous sulfate 325mg daily        ID:  [id number]    Rheumatology  #Lupus  # APL  - Continue pta hydroxychloroquine 200mg bid   - PTA mycophenolic acid 1500mg q12h on hold in anticipation for LVAD  -- Would plan to hold MMF one week prior to surgery and re-starting 5-7 days after surgery in the absence of surgical site infection, poor wound healing or infection elsewhere.      - Continue pta tamsulosin 0.4mg daily    Diet: Low salt diet <2 g/24 hours  DVT Prophylaxis: Heparin GTT  Fitzgerald Catheter: In place  Code Status: Full   Lines: PICC Line    LVAD/Transplant Evaluation Checklist  [x]?????? labs (CBC, CMP, PT/INR, cystatin C, prealbumin, UA + micro)  [x]?????? Infectious (Hep A/B/C, HIV, treponema, HSV 1/2 IgG, CMV IgG, Toxo IgG, EBV IgG, varicella IgG, Quant gold, COVID vaccine/PCR)  [x]?????? Utox/nicotine and cotinine/PeTH   [x]?????? Immunocompatibility (last transfusion, ABO, HLA tissue typing, PRA)  [x]?????? ECG, Echo  [x]?????? CPX - can be oupatient  [x]?????? 6MWT - can be outpatient  [x]?????? RHC, completed but needs repeat in 2-3 months to assess PVR  [x]?????? CVTS consult  [x]?????? Social work consult  [x]??????  Palliative care consult: final note pending  [x]?????? Neuropsych consult: order placed: final note pending  [x]?????? Nutrition consult  [x]?????? CT Dental   [x]?????? Dental consult  [x]?????? Abd US + doppler  - Abd US complete done, not with doppler; OK given unremarkable CT CAP  [x]?????? Extremity US and ABIs  [x]?????? Carotid US (if DM or ICM or >49yo)  [x]?????? PFTs: outpatient  [x]?????? Dexa: outpatient  [x]?????? CT head non-contrast  [x]?????? CT CAP non-contrast  [x]?????? Colonoscopy (>49 yo)  [x]?????? PSA/HCG      This  patient was seen and discussed with Dr. Geno MD who agrees with the above assessment and plan.     Juancho Galan MD, MSc  Cardiovascular Disease Fellow  [facility]

## 2022-06-22 NOTE — PROGRESS NOTES
Cardiac Surgery  ----------------------------------  Dandy Sands is a 61 year old year old male with ICM and end stage heart disease in cardiogenic shock. He is currently requiring invasive monitoring in the form of a pulmonary artery catheter. He also is requiring temporary mechanical support in the form of a intra-aortic balloon pump as well as intravenous inotropic agents. He was approved in multi-disciplinary heart failure committee for orthotopic heart transplant and is currently listed status 2. While he is awaiting a suitable donor we recommend moving his femoral IABP to the subclavian position via a chimney graft to allow for ongoing rehabilitation and ambulation. I discussed the steps of the procedure as well as the risks, benefits, and alternatives. Risks include vessel injury, nerve injury, IABP displacement requiring repositioning, graft infection. I also explained the intention is to upon removal leave the chimney graft in place due to the complexity and risk of injury required with removal. This may lead to a 10-20% risk of graft infection at which time we would explant the foreign material. he is in agreement with proceeding with surgery.    Hayden Veras MD  Cardiothoracic Surgery  [phone]

## 2022-06-23 ENCOUNTER — ANCILLARY PROCEDURE (OUTPATIENT)
Dept: CARDIOLOGY | Facility: CLINIC | Age: 62
DRG: 001 | End: 2022-06-23
Attending: THORACIC SURGERY (CARDIOTHORACIC VASCULAR SURGERY)
Payer: COMMERCIAL

## 2022-06-23 ENCOUNTER — ANCILLARY PROCEDURE (OUTPATIENT)
Dept: CARDIOLOGY | Facility: CLINIC | Age: 62
DRG: 001 | End: 2022-06-23
Attending: INTERNAL MEDICINE
Payer: COMMERCIAL

## 2022-06-23 ENCOUNTER — APPOINTMENT (OUTPATIENT)
Dept: GENERAL RADIOLOGY | Facility: CLINIC | Age: 62
DRG: 001 | End: 2022-06-23
Attending: INTERNAL MEDICINE
Payer: COMMERCIAL

## 2022-06-23 ENCOUNTER — APPOINTMENT (OUTPATIENT)
Dept: GENERAL RADIOLOGY | Facility: CLINIC | Age: 62
DRG: 001 | End: 2022-06-23
Attending: THORACIC SURGERY (CARDIOTHORACIC VASCULAR SURGERY)
Payer: COMMERCIAL

## 2022-06-23 DIAGNOSIS — I25.5 ISCHEMIC CARDIOMYOPATHY: Primary | ICD-10-CM

## 2022-06-23 DIAGNOSIS — I25.5 ISCHEMIC CARDIOMYOPATHY: ICD-10-CM

## 2022-06-23 LAB
ALBUMIN SERPL BCG-MCNC: 3.1 G/DL (ref 3.5–5.2)
ALBUMIN SERPL BCG-MCNC: 3.1 G/DL (ref 3.5–5.2)
ALBUMIN SERPL BCG-MCNC: 3.3 G/DL (ref 3.5–5.2)
ALBUMIN SERPL BCG-MCNC: 3.6 G/DL (ref 3.5–5.2)
ALP SERPL-CCNC: 102 U/L (ref 40–129)
ALP SERPL-CCNC: 102 U/L (ref 40–129)
ALP SERPL-CCNC: 104 U/L (ref 40–129)
ALP SERPL-CCNC: 138 U/L (ref 40–129)
ALT SERPL W P-5'-P-CCNC: 333 U/L (ref 10–50)
ALT SERPL W P-5'-P-CCNC: 334 U/L (ref 10–50)
ALT SERPL W P-5'-P-CCNC: 387 U/L (ref 10–50)
ALT SERPL W P-5'-P-CCNC: 402 U/L (ref 10–50)
ANION GAP SERPL CALCULATED.3IONS-SCNC: 11 MMOL/L (ref 7–15)
ANION GAP SERPL CALCULATED.3IONS-SCNC: 11 MMOL/L (ref 7–15)
ANION GAP SERPL CALCULATED.3IONS-SCNC: 12 MMOL/L (ref 7–15)
ANION GAP SERPL CALCULATED.3IONS-SCNC: 8 MMOL/L (ref 7–15)
AST SERPL W P-5'-P-CCNC: 112 U/L (ref 10–50)
AST SERPL W P-5'-P-CCNC: 116 U/L (ref 10–50)
AST SERPL W P-5'-P-CCNC: 120 U/L (ref 10–50)
AST SERPL W P-5'-P-CCNC: 96 U/L (ref 10–50)
BASE EXCESS BLDV CALC-SCNC: -0.1 MMOL/L (ref -7.7–1.9)
BASE EXCESS BLDV CALC-SCNC: -1.2 MMOL/L (ref -7.7–1.9)
BASE EXCESS BLDV CALC-SCNC: -2 MMOL/L (ref -7.7–1.9)
BASE EXCESS BLDV CALC-SCNC: -2.1 MMOL/L (ref -7.7–1.9)
BASE EXCESS BLDV CALC-SCNC: -2.2 MMOL/L (ref -7.7–1.9)
BILIRUB SERPL-MCNC: 0.4 MG/DL
BILIRUB SERPL-MCNC: 0.4 MG/DL
BILIRUB SERPL-MCNC: 0.5 MG/DL
BILIRUB SERPL-MCNC: 0.6 MG/DL
BUN SERPL-MCNC: 18.6 MG/DL (ref 8–23)
BUN SERPL-MCNC: 19.4 MG/DL (ref 8–23)
BUN SERPL-MCNC: 20.5 MG/DL (ref 8–23)
BUN SERPL-MCNC: 21.2 MG/DL (ref 8–23)
CA-I BLD-MCNC: 4.5 MG/DL (ref 4.4–5.2)
CALCIUM SERPL-MCNC: 8.7 MG/DL (ref 8.8–10.2)
CALCIUM SERPL-MCNC: 8.8 MG/DL (ref 8.8–10.2)
CALCIUM SERPL-MCNC: 9.1 MG/DL (ref 8.8–10.2)
CALCIUM SERPL-MCNC: 9.1 MG/DL (ref 8.8–10.2)
CHLORIDE SERPL-SCNC: 101 MMOL/L (ref 98–107)
CHLORIDE SERPL-SCNC: 102 MMOL/L (ref 98–107)
CHLORIDE SERPL-SCNC: 103 MMOL/L (ref 98–107)
CHLORIDE SERPL-SCNC: 104 MMOL/L (ref 98–107)
CREAT SERPL-MCNC: 0.69 MG/DL (ref 0.67–1.17)
CREAT SERPL-MCNC: 0.77 MG/DL (ref 0.67–1.17)
CREAT SERPL-MCNC: 0.86 MG/DL (ref 0.67–1.17)
CREAT SERPL-MCNC: 0.87 MG/DL (ref 0.67–1.17)
DEPRECATED HCO3 PLAS-SCNC: 19 MMOL/L (ref 22–29)
DEPRECATED HCO3 PLAS-SCNC: 22 MMOL/L (ref 22–29)
ERYTHROCYTE [DISTWIDTH] IN BLOOD BY AUTOMATED COUNT: 17.6 % (ref 10–15)
ERYTHROCYTE [DISTWIDTH] IN BLOOD BY AUTOMATED COUNT: 17.7 % (ref 10–15)
ERYTHROCYTE [DISTWIDTH] IN BLOOD BY AUTOMATED COUNT: 18 % (ref 10–15)
GFR SERPL CREATININE-BSD FRML MDRD: >90 ML/MIN/1.73M2
GLUCOSE BLDC GLUCOMTR-MCNC: 117 MG/DL (ref 70–99)
GLUCOSE BLDC GLUCOMTR-MCNC: 122 MG/DL (ref 70–99)
GLUCOSE SERPL-MCNC: 112 MG/DL (ref 70–99)
GLUCOSE SERPL-MCNC: 117 MG/DL (ref 70–99)
GLUCOSE SERPL-MCNC: 154 MG/DL (ref 70–99)
GLUCOSE SERPL-MCNC: 180 MG/DL (ref 70–99)
HCO3 BLDV-SCNC: 22 MMOL/L (ref 21–28)
HCO3 BLDV-SCNC: 22 MMOL/L (ref 21–28)
HCO3 BLDV-SCNC: 23 MMOL/L (ref 21–28)
HCO3 BLDV-SCNC: 23 MMOL/L (ref 21–28)
HCO3 BLDV-SCNC: 24 MMOL/L (ref 21–28)
HCT VFR BLD AUTO: 25.4 % (ref 40–53)
HCT VFR BLD AUTO: 26.3 % (ref 40–53)
HCT VFR BLD AUTO: 26.3 % (ref 40–53)
HGB BLD-MCNC: 7.6 G/DL (ref 13.3–17.7)
HGB BLD-MCNC: 7.9 G/DL (ref 13.3–17.7)
HGB BLD-MCNC: 7.9 G/DL (ref 13.3–17.7)
HGB BLD-MCNC: 8.1 G/DL (ref 13.3–17.7)
INR PPP: 1.24 (ref 0.85–1.15)
LACTATE SERPL-SCNC: 0.5 MMOL/L (ref 0.7–2)
LACTATE SERPL-SCNC: 0.8 MMOL/L (ref 0.7–2)
LACTATE SERPL-SCNC: 0.8 MMOL/L (ref 0.7–2)
LACTATE SERPL-SCNC: 0.9 MMOL/L (ref 0.7–2)
MAGNESIUM SERPL-MCNC: 1.9 MG/DL (ref 1.7–2.3)
MAGNESIUM SERPL-MCNC: 2.4 MG/DL (ref 1.7–2.3)
MCH RBC QN AUTO: 28.3 PG (ref 26.5–33)
MCH RBC QN AUTO: 28.4 PG (ref 26.5–33)
MCH RBC QN AUTO: 28.9 PG (ref 26.5–33)
MCHC RBC AUTO-ENTMCNC: 29.9 G/DL (ref 31.5–36.5)
MCHC RBC AUTO-ENTMCNC: 30 G/DL (ref 31.5–36.5)
MCHC RBC AUTO-ENTMCNC: 30 G/DL (ref 31.5–36.5)
MCV RBC AUTO: 94 FL (ref 78–100)
MCV RBC AUTO: 95 FL (ref 78–100)
MCV RBC AUTO: 96 FL (ref 78–100)
O2/TOTAL GAS SETTING VFR VENT: 1 %
O2/TOTAL GAS SETTING VFR VENT: 21 %
OXYHGB MFR BLDV: 40 % (ref 70–75)
OXYHGB MFR BLDV: 45 % (ref 70–75)
OXYHGB MFR BLDV: 54 % (ref 70–75)
OXYHGB MFR BLDV: 54 % (ref 70–75)
OXYHGB MFR BLDV: 56 % (ref 70–75)
PCO2 BLDV: 35 MM HG (ref 40–50)
PCO2 BLDV: 37 MM HG (ref 40–50)
PCO2 BLDV: 39 MM HG (ref 40–50)
PH BLDV: 7.38 [PH] (ref 7.32–7.43)
PH BLDV: 7.39 [PH] (ref 7.32–7.43)
PH BLDV: 7.41 [PH] (ref 7.32–7.43)
PH BLDV: 7.43 [PH] (ref 7.32–7.43)
PH BLDV: 7.44 [PH] (ref 7.32–7.43)
PHOSPHATE SERPL-MCNC: 3.7 MG/DL (ref 2.5–4.5)
PHOSPHATE SERPL-MCNC: 3.8 MG/DL (ref 2.5–4.5)
PLATELET # BLD AUTO: 189 10E3/UL (ref 150–450)
PLATELET # BLD AUTO: 201 10E3/UL (ref 150–450)
PLATELET # BLD AUTO: 207 10E3/UL (ref 150–450)
PO2 BLDV: 28 MM HG (ref 25–47)
PO2 BLDV: 28 MM HG (ref 25–47)
PO2 BLDV: 32 MM HG (ref 25–47)
PO2 BLDV: 32 MM HG (ref 25–47)
PO2 BLDV: 33 MM HG (ref 25–47)
POTASSIUM SERPL-SCNC: 3.8 MMOL/L (ref 3.4–5.3)
POTASSIUM SERPL-SCNC: 4.4 MMOL/L (ref 3.4–4.5)
POTASSIUM SERPL-SCNC: 4.4 MMOL/L (ref 3.4–5.3)
POTASSIUM SERPL-SCNC: 4.7 MMOL/L (ref 3.4–5.3)
PROT SERPL-MCNC: 5.8 G/DL (ref 6.4–8.3)
PROT SERPL-MCNC: 5.8 G/DL (ref 6.4–8.3)
PROT SERPL-MCNC: 6 G/DL (ref 6.4–8.3)
PROT SERPL-MCNC: 6.7 G/DL (ref 6.4–8.3)
RBC # BLD AUTO: 2.69 10E6/UL (ref 4.4–5.9)
RBC # BLD AUTO: 2.73 10E6/UL (ref 4.4–5.9)
RBC # BLD AUTO: 2.78 10E6/UL (ref 4.4–5.9)
SODIUM SERPL-SCNC: 133 MMOL/L (ref 136–145)
SODIUM SERPL-SCNC: 134 MMOL/L (ref 136–145)
SODIUM SERPL-SCNC: 134 MMOL/L (ref 136–145)
SODIUM SERPL-SCNC: 136 MMOL/L (ref 136–145)
UFH PPP CHRO-ACNC: <0.1 IU/ML
WBC # BLD AUTO: 6.3 10E3/UL (ref 4–11)
WBC # BLD AUTO: 8.7 10E3/UL (ref 4–11)
WBC # BLD AUTO: 8.7 10E3/UL (ref 4–11)

## 2022-06-23 PROCEDURE — 999N000179 XR SURGERY CARM FLUORO LESS THAN 5 MIN W STILLS: Mod: TC

## 2022-06-23 PROCEDURE — 250N000013 HC RX MED GY IP 250 OP 250 PS 637

## 2022-06-23 PROCEDURE — 84100 ASSAY OF PHOSPHORUS: CPT | Performed by: INTERNAL MEDICINE

## 2022-06-23 PROCEDURE — 82805 BLOOD GASES W/O2 SATURATION: CPT

## 2022-06-23 PROCEDURE — 250N000013 HC RX MED GY IP 250 OP 250 PS 637: Performed by: INTERNAL MEDICINE

## 2022-06-23 PROCEDURE — 33968 REMOVE AORTIC ASSIST DEVICE: CPT

## 2022-06-23 PROCEDURE — 82330 ASSAY OF CALCIUM: CPT | Performed by: INTERNAL MEDICINE

## 2022-06-23 PROCEDURE — 93287 PERI-PX DEVICE EVAL & PRGR: CPT

## 2022-06-23 PROCEDURE — 250N000011 HC RX IP 250 OP 636: Performed by: THORACIC SURGERY (CARDIOTHORACIC VASCULAR SURGERY)

## 2022-06-23 PROCEDURE — 250N000009 HC RX 250: Performed by: ANESTHESIOLOGY

## 2022-06-23 PROCEDURE — 250N000013 HC RX MED GY IP 250 OP 250 PS 637: Performed by: STUDENT IN AN ORGANIZED HEALTH CARE EDUCATION/TRAINING PROGRAM

## 2022-06-23 PROCEDURE — 999N000065 XR CHEST PORT 1 VIEW

## 2022-06-23 PROCEDURE — 250N000011 HC RX IP 250 OP 636

## 2022-06-23 PROCEDURE — 93287 PERI-PX DEVICE EVAL & PRGR: CPT | Mod: 26 | Performed by: INTERNAL MEDICINE

## 2022-06-23 PROCEDURE — 71045 X-RAY EXAM CHEST 1 VIEW: CPT | Mod: 26 | Performed by: RADIOLOGY

## 2022-06-23 PROCEDURE — 250N000011 HC RX IP 250 OP 636: Performed by: STUDENT IN AN ORGANIZED HEALTH CARE EDUCATION/TRAINING PROGRAM

## 2022-06-23 PROCEDURE — 71045 X-RAY EXAM CHEST 1 VIEW: CPT | Mod: 26 | Performed by: STUDENT IN AN ORGANIZED HEALTH CARE EDUCATION/TRAINING PROGRAM

## 2022-06-23 PROCEDURE — 71045 X-RAY EXAM CHEST 1 VIEW: CPT | Mod: 77

## 2022-06-23 PROCEDURE — 370N000017 HC ANESTHESIA TECHNICAL FEE, PER MIN: Performed by: THORACIC SURGERY (CARDIOTHORACIC VASCULAR SURGERY)

## 2022-06-23 PROCEDURE — 80053 COMPREHEN METABOLIC PANEL: CPT | Performed by: STUDENT IN AN ORGANIZED HEALTH CARE EDUCATION/TRAINING PROGRAM

## 2022-06-23 PROCEDURE — 84450 TRANSFERASE (AST) (SGOT): CPT | Performed by: STUDENT IN AN ORGANIZED HEALTH CARE EDUCATION/TRAINING PROGRAM

## 2022-06-23 PROCEDURE — 999N000045 HC STATISTIC DAILY SWAN MONITORING

## 2022-06-23 PROCEDURE — 250N000009 HC RX 250

## 2022-06-23 PROCEDURE — 200N000002 HC R&B ICU UMMC

## 2022-06-23 PROCEDURE — 999N000185 HC STATISTIC TRANSPORT TIME EA 15 MIN

## 2022-06-23 PROCEDURE — 999N000076 HC STATISTIC ICP MONITORING

## 2022-06-23 PROCEDURE — 03U30JZ SUPPLEMENT RIGHT SUBCLAVIAN ARTERY WITH SYNTHETIC SUBSTITUTE, OPEN APPROACH: ICD-10-PCS | Performed by: THORACIC SURGERY (CARDIOTHORACIC VASCULAR SURGERY)

## 2022-06-23 PROCEDURE — 360N000085 HC SURGERY LEVEL 5 W/ FLUORO, PER MIN: Performed by: THORACIC SURGERY (CARDIOTHORACIC VASCULAR SURGERY)

## 2022-06-23 PROCEDURE — 85520 HEPARIN ASSAY: CPT | Performed by: INTERNAL MEDICINE

## 2022-06-23 PROCEDURE — 999N000075 HC STATISTIC IABP MONITORING

## 2022-06-23 PROCEDURE — C1769 GUIDE WIRE: HCPCS | Performed by: THORACIC SURGERY (CARDIOTHORACIC VASCULAR SURGERY)

## 2022-06-23 PROCEDURE — 83605 ASSAY OF LACTIC ACID: CPT

## 2022-06-23 PROCEDURE — 999N000063 XR CHEST PORT 1 VIEW

## 2022-06-23 PROCEDURE — 258N000003 HC RX IP 258 OP 636: Performed by: INTERNAL MEDICINE

## 2022-06-23 PROCEDURE — 85610 PROTHROMBIN TIME: CPT | Performed by: INTERNAL MEDICINE

## 2022-06-23 PROCEDURE — 250N000009 HC RX 250: Performed by: THORACIC SURGERY (CARDIOTHORACIC VASCULAR SURGERY)

## 2022-06-23 PROCEDURE — 272N000057 HC CATH BALLOON IABP

## 2022-06-23 PROCEDURE — 71045 X-RAY EXAM CHEST 1 VIEW: CPT

## 2022-06-23 PROCEDURE — 83735 ASSAY OF MAGNESIUM: CPT | Performed by: INTERNAL MEDICINE

## 2022-06-23 PROCEDURE — 99291 CRITICAL CARE FIRST HOUR: CPT | Mod: 25 | Performed by: INTERNAL MEDICINE

## 2022-06-23 PROCEDURE — 999N000157 HC STATISTIC RCP TIME EA 10 MIN

## 2022-06-23 PROCEDURE — 278N000051 HC OR IMPLANT GENERAL: Performed by: THORACIC SURGERY (CARDIOTHORACIC VASCULAR SURGERY)

## 2022-06-23 PROCEDURE — 272N000001 HC OR GENERAL SUPPLY STERILE: Performed by: THORACIC SURGERY (CARDIOTHORACIC VASCULAR SURGERY)

## 2022-06-23 PROCEDURE — 250N000009 HC RX 250: Performed by: INTERNAL MEDICINE

## 2022-06-23 PROCEDURE — 999N000077 HC STATISTIC INSERT IABP

## 2022-06-23 PROCEDURE — 999N000155 HC STATISTIC RAPCV CVP MONITORING

## 2022-06-23 PROCEDURE — 85027 COMPLETE CBC AUTOMATED: CPT | Performed by: STUDENT IN AN ORGANIZED HEALTH CARE EDUCATION/TRAINING PROGRAM

## 2022-06-23 PROCEDURE — 5A02210 ASSISTANCE WITH CARDIAC OUTPUT USING BALLOON PUMP, CONTINUOUS: ICD-10-PCS | Performed by: THORACIC SURGERY (CARDIOTHORACIC VASCULAR SURGERY)

## 2022-06-23 PROCEDURE — 250N000011 HC RX IP 250 OP 636: Performed by: INTERNAL MEDICINE

## 2022-06-23 PROCEDURE — 85018 HEMOGLOBIN: CPT | Performed by: INTERNAL MEDICINE

## 2022-06-23 DEVICE — GELWEAVE GELATIN IMPREGNATED WOVEN VASCULAR PROSTHESIS STRAIGHT
Type: IMPLANTABLE DEVICE | Site: CHEST | Status: FUNCTIONAL
Brand: GELWEAVE™

## 2022-06-23 RX ORDER — ALBUMIN, HUMAN INJ 5% 5 %
250 SOLUTION INTRAVENOUS EVERY 10 MIN PRN
Status: DISCONTINUED | OUTPATIENT
Start: 2022-06-23 | End: 2022-06-25

## 2022-06-23 RX ORDER — SODIUM CHLORIDE, SODIUM GLUCONATE, SODIUM ACETATE, POTASSIUM CHLORIDE AND MAGNESIUM CHLORIDE 526; 502; 368; 37; 30 MG/100ML; MG/100ML; MG/100ML; MG/100ML; MG/100ML
1-3 INJECTION, SOLUTION INTRAVENOUS CONTINUOUS
Status: DISCONTINUED | OUTPATIENT
Start: 2022-06-23 | End: 2022-06-24

## 2022-06-23 RX ORDER — SODIUM CHLORIDE, SODIUM GLUCONATE, SODIUM ACETATE, POTASSIUM CHLORIDE AND MAGNESIUM CHLORIDE 526; 502; 368; 37; 30 MG/100ML; MG/100ML; MG/100ML; MG/100ML; MG/100ML
INJECTION, SOLUTION INTRAVENOUS CONTINUOUS PRN
Status: DISCONTINUED | OUTPATIENT
Start: 2022-06-23 | End: 2022-06-23

## 2022-06-23 RX ORDER — EPINEPHRINE IN SOD CHLOR,ISO 1 MG/10 ML
SYRINGE (ML) INTRAVENOUS
Status: COMPLETED
Start: 2022-06-23 | End: 2022-06-23

## 2022-06-23 RX ORDER — NALOXONE HYDROCHLORIDE 0.4 MG/ML
0.2 INJECTION, SOLUTION INTRAMUSCULAR; INTRAVENOUS; SUBCUTANEOUS
Status: DISCONTINUED | OUTPATIENT
Start: 2022-06-23 | End: 2022-07-08

## 2022-06-23 RX ORDER — NALOXONE HYDROCHLORIDE 0.4 MG/ML
0.4 INJECTION, SOLUTION INTRAMUSCULAR; INTRAVENOUS; SUBCUTANEOUS
Status: DISCONTINUED | OUTPATIENT
Start: 2022-06-23 | End: 2022-07-08

## 2022-06-23 RX ORDER — EPINEPHRINE IN SOD CHLOR,ISO 1 MG/10 ML
2 SYRINGE (ML) INTRAVENOUS ONCE
Status: COMPLETED | OUTPATIENT
Start: 2022-06-23 | End: 2022-06-23

## 2022-06-23 RX ORDER — OXYCODONE HYDROCHLORIDE 5 MG/1
5 TABLET ORAL EVERY 6 HOURS PRN
Status: DISCONTINUED | OUTPATIENT
Start: 2022-06-23 | End: 2022-07-02

## 2022-06-23 RX ORDER — CEFAZOLIN SODIUM/WATER 2 G/20 ML
2 SYRINGE (ML) INTRAVENOUS
Status: DISCONTINUED | OUTPATIENT
Start: 2022-06-23 | End: 2022-06-24

## 2022-06-23 RX ORDER — SODIUM CHLORIDE, SODIUM LACTATE, POTASSIUM CHLORIDE, CALCIUM CHLORIDE 600; 310; 30; 20 MG/100ML; MG/100ML; MG/100ML; MG/100ML
1-3 INJECTION, SOLUTION INTRAVENOUS CONTINUOUS
Status: DISCONTINUED | OUTPATIENT
Start: 2022-06-23 | End: 2022-06-24

## 2022-06-23 RX ORDER — KETAMINE HYDROCHLORIDE 10 MG/ML
INJECTION INTRAMUSCULAR; INTRAVENOUS PRN
Status: DISCONTINUED | OUTPATIENT
Start: 2022-06-23 | End: 2022-06-23

## 2022-06-23 RX ORDER — HEPARIN SODIUM 1000 [USP'U]/ML
INJECTION, SOLUTION INTRAVENOUS; SUBCUTANEOUS PRN
Status: DISCONTINUED | OUTPATIENT
Start: 2022-06-23 | End: 2022-06-23

## 2022-06-23 RX ORDER — POTASSIUM CHLORIDE 29.8 MG/ML
20 INJECTION INTRAVENOUS ONCE
Status: COMPLETED | OUTPATIENT
Start: 2022-06-23 | End: 2022-06-24

## 2022-06-23 RX ORDER — LIDOCAINE HYDROCHLORIDE AND EPINEPHRINE 10; 10 MG/ML; UG/ML
INJECTION, SOLUTION INFILTRATION; PERINEURAL PRN
Status: DISCONTINUED | OUTPATIENT
Start: 2022-06-23 | End: 2022-06-25

## 2022-06-23 RX ORDER — DOBUTAMINE HYDROCHLORIDE 200 MG/100ML
2.5 INJECTION INTRAVENOUS CONTINUOUS
Status: DISCONTINUED | OUTPATIENT
Start: 2022-06-23 | End: 2022-06-28

## 2022-06-23 RX ORDER — BUMETANIDE 0.25 MG/ML
2 INJECTION INTRAMUSCULAR; INTRAVENOUS ONCE
Status: COMPLETED | OUTPATIENT
Start: 2022-06-23 | End: 2022-06-23

## 2022-06-23 RX ORDER — ONDANSETRON 2 MG/ML
4 INJECTION INTRAMUSCULAR; INTRAVENOUS ONCE
Status: COMPLETED | OUTPATIENT
Start: 2022-06-23 | End: 2022-06-23

## 2022-06-23 RX ORDER — CEFAZOLIN SODIUM 2 G/100ML
2 INJECTION, SOLUTION INTRAVENOUS SEE ADMIN INSTRUCTIONS
Status: DISCONTINUED | OUTPATIENT
Start: 2022-06-23 | End: 2022-06-24

## 2022-06-23 RX ORDER — NOREPINEPHRINE BITARTRATE 0.06 MG/ML
.01-.1 INJECTION, SOLUTION INTRAVENOUS CONTINUOUS
Status: DISCONTINUED | OUTPATIENT
Start: 2022-06-23 | End: 2022-06-23

## 2022-06-23 RX ORDER — FENTANYL CITRATE 50 UG/ML
INJECTION, SOLUTION INTRAMUSCULAR; INTRAVENOUS PRN
Status: DISCONTINUED | OUTPATIENT
Start: 2022-06-23 | End: 2022-06-23

## 2022-06-23 RX ORDER — MAGNESIUM SULFATE HEPTAHYDRATE 40 MG/ML
2 INJECTION, SOLUTION INTRAVENOUS ONCE
Status: COMPLETED | OUTPATIENT
Start: 2022-06-23 | End: 2022-06-24

## 2022-06-23 RX ORDER — DEXMEDETOMIDINE HYDROCHLORIDE 4 UG/ML
INJECTION, SOLUTION INTRAVENOUS CONTINUOUS PRN
Status: DISCONTINUED | OUTPATIENT
Start: 2022-06-23 | End: 2022-06-23

## 2022-06-23 RX ORDER — SODIUM CHLORIDE, SODIUM GLUCONATE, SODIUM ACETATE, POTASSIUM CHLORIDE AND MAGNESIUM CHLORIDE 526; 502; 368; 37; 30 MG/100ML; MG/100ML; MG/100ML; MG/100ML; MG/100ML
250 INJECTION, SOLUTION INTRAVENOUS EVERY 10 MIN PRN
Status: DISCONTINUED | OUTPATIENT
Start: 2022-06-23 | End: 2022-06-25

## 2022-06-23 RX ORDER — CHLORHEXIDINE GLUCONATE ORAL RINSE 1.2 MG/ML
10 SOLUTION DENTAL ONCE
Status: DISCONTINUED | OUTPATIENT
Start: 2022-06-23 | End: 2022-06-25

## 2022-06-23 RX ADMIN — EPINEPHRINE 0.2 MG: 0.1 INJECTION INTRACARDIAC; INTRAVENOUS at 20:15

## 2022-06-23 RX ADMIN — OXYMETAZOLINE HYDROCHLORIDE 2 SPRAY: 0.05 SPRAY NASAL at 12:59

## 2022-06-23 RX ADMIN — Medication 2 G: at 07:45

## 2022-06-23 RX ADMIN — Medication 20 MG: at 08:58

## 2022-06-23 RX ADMIN — TRAZODONE HYDROCHLORIDE 100 MG: 50 TABLET ORAL at 22:11

## 2022-06-23 RX ADMIN — SALINE NASAL SPRAY 2 SPRAY: 1.5 SOLUTION NASAL at 22:45

## 2022-06-23 RX ADMIN — Medication 0.7 MCG/KG/HR: at 07:29

## 2022-06-23 RX ADMIN — FENTANYL CITRATE 25 MCG: 50 INJECTION, SOLUTION INTRAMUSCULAR; INTRAVENOUS at 08:45

## 2022-06-23 RX ADMIN — HYDROXYCHLOROQUINE SULFATE 200 MG: 200 TABLET, FILM COATED ORAL at 19:56

## 2022-06-23 RX ADMIN — ASPIRIN 81 MG CHEWABLE TABLET 81 MG: 81 TABLET CHEWABLE at 12:47

## 2022-06-23 RX ADMIN — Medication 20 MG: at 08:08

## 2022-06-23 RX ADMIN — ONDANSETRON 4 MG: 2 INJECTION INTRAMUSCULAR; INTRAVENOUS at 13:05

## 2022-06-23 RX ADMIN — Medication 10 MG: at 08:21

## 2022-06-23 RX ADMIN — POTASSIUM CHLORIDE 20 MEQ: 29.8 INJECTION, SOLUTION INTRAVENOUS at 23:29

## 2022-06-23 RX ADMIN — Medication 20 MG: at 09:20

## 2022-06-23 RX ADMIN — FENTANYL CITRATE 50 MCG: 50 INJECTION, SOLUTION INTRAMUSCULAR; INTRAVENOUS at 07:29

## 2022-06-23 RX ADMIN — ACETAMINOPHEN 325 MG: 325 TABLET, FILM COATED ORAL at 22:11

## 2022-06-23 RX ADMIN — HEPARIN SODIUM 1100 UNITS/HR: 10000 INJECTION, SOLUTION INTRAVENOUS at 23:35

## 2022-06-23 RX ADMIN — Medication 0.2 MG: at 20:15

## 2022-06-23 RX ADMIN — Medication 10 MG: at 22:11

## 2022-06-23 RX ADMIN — Medication 10 MG: at 09:27

## 2022-06-23 RX ADMIN — Medication 10 MG: at 08:13

## 2022-06-23 RX ADMIN — Medication 10 MG: at 08:30

## 2022-06-23 RX ADMIN — Medication 10 MG: at 08:45

## 2022-06-23 RX ADMIN — LIDOCAINE PATCH 4% 2 PATCH: 40 PATCH TOPICAL at 19:56

## 2022-06-23 RX ADMIN — Medication 20 MG: at 09:36

## 2022-06-23 RX ADMIN — Medication 20 MG: at 08:38

## 2022-06-23 RX ADMIN — SODIUM NITROPRUSSIDE 0.1 MCG/KG/MIN: 25 INJECTION, SOLUTION, CONCENTRATE INTRAVENOUS at 17:58

## 2022-06-23 RX ADMIN — FENTANYL CITRATE 25 MCG: 50 INJECTION, SOLUTION INTRAMUSCULAR; INTRAVENOUS at 08:03

## 2022-06-23 RX ADMIN — Medication 325 MG: at 13:05

## 2022-06-23 RX ADMIN — BUMETANIDE 2 MG: 0.25 INJECTION, SOLUTION INTRAMUSCULAR; INTRAVENOUS at 14:39

## 2022-06-23 RX ADMIN — FLUTICASONE FUROATE AND VILANTEROL TRIFENATATE 1 PUFF: 100; 25 POWDER RESPIRATORY (INHALATION) at 12:59

## 2022-06-23 RX ADMIN — DOBUTAMINE HYDROCHLORIDE 2.5 MCG/KG/MIN: 200 INJECTION INTRAVENOUS at 12:41

## 2022-06-23 RX ADMIN — Medication 20 MG: at 08:12

## 2022-06-23 RX ADMIN — ONDANSETRON 4 MG: 2 INJECTION INTRAMUSCULAR; INTRAVENOUS at 04:13

## 2022-06-23 RX ADMIN — DOBUTAMINE HYDROCHLORIDE 2.5 MCG/KG/MIN: 200 INJECTION INTRAVENOUS at 23:57

## 2022-06-23 RX ADMIN — SALINE NASAL SPRAY 2 SPRAY: 1.5 SOLUTION NASAL at 16:19

## 2022-06-23 RX ADMIN — HEPARIN SODIUM 5000 UNITS: 1000 INJECTION INTRAVENOUS; SUBCUTANEOUS at 08:39

## 2022-06-23 RX ADMIN — HYDROXYCHLOROQUINE SULFATE 200 MG: 200 TABLET, FILM COATED ORAL at 12:47

## 2022-06-23 RX ADMIN — MIDAZOLAM 1 MG: 1 INJECTION INTRAMUSCULAR; INTRAVENOUS at 08:12

## 2022-06-23 RX ADMIN — ACETAMINOPHEN 325 MG: 325 TABLET, FILM COATED ORAL at 13:05

## 2022-06-23 RX ADMIN — Medication 10 ML: at 07:26

## 2022-06-23 RX ADMIN — MIDAZOLAM 1 MG: 1 INJECTION INTRAMUSCULAR; INTRAVENOUS at 07:29

## 2022-06-23 RX ADMIN — TAMSULOSIN HYDROCHLORIDE 0.4 MG: 0.4 CAPSULE ORAL at 12:47

## 2022-06-23 RX ADMIN — Medication 50 MG: at 08:14

## 2022-06-23 RX ADMIN — SODIUM CHLORIDE, SODIUM GLUCONATE, SODIUM ACETATE, POTASSIUM CHLORIDE AND MAGNESIUM CHLORIDE: 526; 502; 368; 37; 30 INJECTION, SOLUTION INTRAVENOUS at 07:26

## 2022-06-23 RX ADMIN — DULOXETINE 30 MG: 30 CAPSULE, DELAYED RELEASE ORAL at 12:47

## 2022-06-23 RX ADMIN — THIAMINE HCL TAB 100 MG 100 MG: 100 TAB at 12:47

## 2022-06-23 RX ADMIN — SODIUM CHLORIDE, POTASSIUM CHLORIDE, SODIUM LACTATE AND CALCIUM CHLORIDE 250 ML: 600; 310; 30; 20 INJECTION, SOLUTION INTRAVENOUS at 21:24

## 2022-06-23 RX ADMIN — Medication 0.06 MCG/KG/MIN: at 10:52

## 2022-06-23 RX ADMIN — Medication 30 MG: at 09:11

## 2022-06-23 RX ADMIN — OXYCODONE HYDROCHLORIDE 5 MG: 5 TABLET ORAL at 23:28

## 2022-06-23 ASSESSMENT — ACTIVITIES OF DAILY LIVING (ADL)
ADLS_ACUITY_SCORE: 32
ADLS_ACUITY_SCORE: 32
ADLS_ACUITY_SCORE: 36
ADLS_ACUITY_SCORE: 32
ADLS_ACUITY_SCORE: 36

## 2022-06-23 NOTE — PROGRESS NOTES
Cardiology Progress Note 6/23/22    Assessment & Plan   61-year-old man with history of ischemic cardiomyopathy LVEF 15%) NYHA class IV symptoms ACC stage D, multiple admissions with heart failure over the past several months. Presented with cardiogenic shock c/b multi-organ failure (JOSE; Acute liver injury/shock liver) requiring mechanical hemodynamic support. Now listed as status 2 for OHT. Had subclavian IABP placed instead of femoral IABP today 6/23/22. Continue to optimize his hemodynamics.     Changes Today:  - s/p subclavian IABP 6/23/22, swan&IABP in position  - femoral sheath pulled in the unit ~ 12PM, bedrest until 5PM  - afternoon numbers:   CVP 13, PA 33/20(20), PCWP 20, CO/CI 3.6/2, SVR 1200  - Dobutamine restarted + Bumex 2 mg IV*1 given  - continue to hold of hydralazine, isordil  - consider nitroprusside prn for high SVR   - restart heparin at 6PM 6/23/22   - repeat PRA 6/24 as he received blood transfusion 6/17/22  - ENT consult placed overnight for nose bleed, hgb this AM stable at 7.9 -- see below     CV:   #Ambulatory cardiogenic shock SCAI D  #Acute on chronic systolic heart failure  #Advanced ischemic cardiomyopathy, Class IV, Stage D  # SVT  # Nonsustained VT  # CAD s/p PCI to LAD (2005)  # Hx of MI (2007)  # Severe ostial LAD disease with in-stent restenosis of mid LAD at diag bifurcation, severe proximal RCA disease, mild circ disease now s/p ostial LAD and proximal RCA lithotripsy and stenting on 5/24/22  # S/p CRT-D (Medtronic 2006, gen change 2012)  CMRI showing infarcted myocardium in LAD territory. Given the degree of LV dysfunction/dilation, moderate to severe functional MR and lack of viable myocardium, cardiac surgery would be high risk. Now s/p ostial LAD and proximal RCA lithotripsy and stenting on 5/24/22.   - ASA and Coumadin (on hold) continue heparin gtt   - ASA 81 mg until 6/22 given stenting on 5/22      Plan:   - hemodynamic support:   - Continue iABP 1:1   - Inotropes:                - Dobutamine 7.5 --> decrease to 5.0 --> 2.5 --> off (6/22/22)             - restart dobutamine 2.5 ~ 12 PM 6/23/22  - Afterload reduction:   - hold hydralazine 50 mg, isordil 20 mg    - was on nitroprusside prior to going to the procedure  - Fluid status: Goal CVP 8-10   - Bumex 2 mg IV *1   - Anticoagulation:    - heparin gtt held prior to subclavian IABP placement   - will resume 6/23/22 @ 6PM  - Antiplatelet: Continue ASA-81mg until 6/22/ - Statin: hold statin   - BMP BID, K>4 & Mg>2  - Strict I/Os: Goal even to positive for low CVP  - Daily weight     Pulmonary  # Mild-Moderate Emphysema  # Hx of COVID-19  Former smoker 2 ppd for 40 years quit on 09/09/09.  - Appreciate pulmonary consultation given emphysema   - ILD panel: AAT 1 level, autoimmune work- up   - PFTs with DLCO when feasible      - if symptoms are considered related to pulm findings: inhaler therapy with LABA/LAMA combination   - will need outpatient pulmonary follow up with seiral PFT hs- CT scan with inspiration and expiration views    Renal:  -Strict I&O while diuresing   -Monitor Electrolytes while actively diuresing K->4 Mg->2     Gastroenterology:  # Severe malnutrition in the context of chronic illness  # Liver injury, in the setting of cardiogenic shock  # GERD  - Diuresis as above  - Supplements to help maintain nutrition.     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 Also had one severe epistaxis  that occurred at [place] requiring cauterization  - ENT consult placed overnight  - Left nasal cavity packed with all absorbable packing - surgifoam + surgicel + surgiflo. No antibiotics are indicated due to absorbable packing.  - saline nasal pray q3h  - if begins to  in quantity of bleeding would recommend spraying the nasal cavity copiously with afrin and holding pressure over the soft part of the nose for 20 minutes continuously. Nasal clips are ineffective and should not be used in place of digital pressure. If bleeding continues despite these measures, repeat. If bleeding continues or is severe please contact ENT.      ID:  [id number]     Rheumatology  #Lupus  # APL  - Continue pta hydroxychloroquine 200mg bid   - PTA mycophenolic acid 1500mg q12h on hold in anticipation for LVAD  -- Would plan to hold MMF one week prior to surgery and re-starting 5-7 days after surgery in the absence of surgical site infection, poor wound healing or infection elsewhere.       - Continue pta tamsulosin 0.4mg daily     Diet: Low salt diet <2 g/24 hours  DVT Prophylaxis: Heparin GTT  Fitzgerald Catheter: In place  Code Status: Full   Lines: PICC Line     LVAD/Transplant Evaluation Checklist  [x]??????? labs (CBC, CMP, PT/INR, cystatin C, prealbumin, UA + micro)  [x]??????? Infectious (Hep A/B/C, HIV, treponema, HSV 1/2 IgG, CMV IgG, Toxo IgG, EBV IgG, varicella IgG, Quant gold, COVID vaccine/PCR)  [x]??????? Utox/nicotine and cotinine/PeTH   [x]??????? Immunocompatibility (last transfusion, ABO, HLA tissue typing, PRA)  [x]??????? ECG, Echo  [x]??????? CPX - can be oupatient  [x]??????? 6MWT - can be outpatient  [x]??????? RHC, completed but needs repeat in 2-3 months to assess PVR  [x]??????? CVTS consult  [x]??????? Social work consult  [x]???????  Palliative care consult: final note pending  [x]??????? Neuropsych consult: order placed: final note pending  [x]??????? Nutrition consult  [x]??????? CT Dental  "  [x]??????? Dental consult  [x]??????? Abd US + doppler  - Abd US complete done, not with doppler; OK given unremarkable CT CAP  [x]??????? Extremity US and ABIs  [x]??????? Carotid US (if DM or ICM or >51yo)  [x]??????? PFTs: outpatient  [x]??????? Dexa: outpatient  [x]??????? CT head non-contrast  [x]??????? CT CAP non-contrast  [x]??????? Colonoscopy (>51 yo)  [x]??????? PSA/HCG    Liborio Layton MD   PGY-2 Internal Medicine  ----------------------------------------------------------------------------------------------------------------    Interval History    Had a bleeding overnight from his nose +     Physical Exam   Temp: 97.6  F (36.4  C) Temp src: Oral BP: 102/49 Pulse: 90   Resp: 22 SpO2: 94 % O2 Device: None (Room air)    Vitals:    06/20/22 0000 06/21/22 0400 06/23/22 0600   Weight: 67.9 kg (149 lb 11.1 oz) 66.8 kg (147 lb 4.3 oz) 66.3 kg (146 lb 2.6 oz)     Vital Signs with Ranges  Temp:  [97.4  F (36.3  C)-97.6  F (36.4  C)] 97.6  F (36.4  C)  Pulse:  [] 90  Resp:  [12-30] 22  BP: ()/(45-86) 102/49  SpO2:  [91 %-99 %] 94 %  I/O last 3 completed shifts:  In: 1881.58 [P.O.:1160; I.V.:721.58]  Out: 1225 [Urine:825; Emesis/NG output:400]     , Blood pressure 102/49, pulse 90, temperature 97.6  F (36.4  C), temperature source Oral, resp. rate 22, height 1.702 m (5' 7\"), weight 66.3 kg (146 lb 2.6 oz), SpO2 94 %.  146 lbs 2.64 oz  GEN:  Alert, oriented x 3, appears comfortable, NAD.  CV:  Regular rate and rhythm, no murmur or JVD.  S1 + S2 noted, no S3 or S4.  LUNGS:  Clear to auscultation bilaterally   ABD:  Active bowel sounds, soft, non-tender/non-distended.  No rebound/guarding/rigidity.  EXT:  No edema or cyanosis.      Medications     BETA BLOCKER NOT PRESCRIBED       EPINEPHrine       heparin Stopped (06/22/22 2100)     lactated ringers       nitroPRUsside Stopped (06/23/22 0931)     norepinephrine       Plasma-Lyte A         aspirin  81 mg Oral Daily     ceFAZolin  2 g " Intravenous Pre-Op/Pre-procedure x 1 dose     ceFAZolin  2 g Intravenous See Admin Instructions     chlorhexidine  10 mL Swish & Spit Once     DULoxetine  30 mg Oral Daily     ferrous sulfate  325 mg Oral Daily with lunch     fluticasone-vilanterol  1 puff Inhalation Daily     [Held by provider] furosemide  80 mg Intravenous BID     [Held by provider] hydrALAZINE  50 mg Oral Q8H CAMMY     hydroxychloroquine  200 mg Oral BID     [Held by provider] isosorbide dinitrate  20 mg Oral TID     lidocaine  2 patch Transdermal Q24h    And     lidocaine   Transdermal Q8H CAMMY     magnesium sulfate  2 g Intravenous Once     oxidized cellulose   Topical Once     oxymetazoline  2 spray Both Nostrils BID     polyethylene glycol  17 g Oral TID     [Held by provider] potassium chloride  20 mEq Oral BID     senna-docusate  3 tablet Oral BID     tamsulosin  0.4 mg Oral Daily     thiamine  100 mg Oral Daily     traZODone  100 mg Oral At Bedtime       Data   Recent Labs   Lab 06/23/22  0340 06/23/22  0011 06/22/22  2132 06/22/22  1552 06/22/22  0834 06/22/22  0520 06/21/22  0828 06/21/22  0349   WBC 6.3  --  6.3  --  5.3  --    < > 5.3   HGB 7.9* 8.1* 8.9*  --  9.0*  --    < > 8.8*   MCV 95  --  97  --  96  --    < > 95     --  234  --  219  --    < > 242   INR 1.24*  --   --   --   --  1.20*  --  1.28*     --  137 138  --   --    < > 137   POTASSIUM 4.4  --  4.3 4.3  --   --    < > 4.1   CHLORIDE 103  --  105 108*  --   --    < > 106   CO2 22  --  21* 22  --   --    < > 25   BUN 18.6  --  12.6 10.2  --   --    < > 9   CR 0.69  --  0.73 0.71  --   --    < > 0.61*   ANIONGAP 11  --  11 8  --   --    < > 6   ELDA 8.8  --  9.2 8.7*  --   --    < > 8.8   *  --  123* 94  --   --    < > 135*   ALBUMIN 3.1*  --  3.2* 3.1*  --   --    < > 2.9*   PROTTOTAL 5.8*  --  6.1* 5.8*  --   --    < > 6.2*   BILITOTAL 0.6  --  0.6 0.6  --   --    < > 1.3   ALKPHOS 102  --  111 101  --   --    < > 98   *  --  460* 444*  --   --    <  > 719*   *  --  124* 105*  --   --    < > 125*    < > = values in this interval not displayed.       Recent Results (from the past 24 hour(s))   US Upper Ext Arterial Duplex Bilat Port    Narrative    ULTRASOUND UPPER EXTREMITY ARTERIAL DUPLEX BILATERAL  6/22/2022 11:09  AM    CLINICAL HISTORY: Subclavian balloon pump placement.     COMPARISONS: None available.    REFERRING PROVIDER: TALISHA DANIELLE    TECHNIQUE: Bilateral subclavian and axillary arteries evaluated with  grayscale, color Doppler, and spectral pulsed wave Doppler ultrasound.    FINDINGS: Abnormal waveforms throughout.  RIGHT:       Subclavian artery, proximal: obscured       Subclavian artery, mid: 78 cm/s, 7.0 mm         Axillary artery: 72 cm/s, 5.9 mm    LEFT:       Subclavian artery, proximal: 94 cm/s, 6.6 mm       Subclavian artery, mid: 82 cm/s, 7.1 mm         Axillary artery: 109 cm/s, 6.4 mm      Impression    IMPRESSION: Right subclavian artery proximal segment obscured.  Otherwise patent bilateral subclavian and axillary arteries with  measurements as in the report.    MARINA CROSS MD         SYSTEM ID:  [id number]   Cardiac Device Check - Inpatient   Result Value    Date Time Interrogation Session [id number]    Implantable Pulse Generator  Medtronic    Implantable Pulse Generator Model [id number] Eliseo TIARDO DR    Implantable Pulse Generator Serial Number [id number]    Type Interrogation Session In Clinic    Clinic Name [facility]    Implantable Pulse Generator Type Defibrillator    Implantable Pulse Generator Implant Date 20180412    Implantable Lead  Medtronic    Implantable Lead Model 5076 CapSureFix Novus    Implantable Lead Serial Number [id number]    Implantable Lead Implant Date 20060306    Implantable Lead Polarity Type Bipolar Lead    Implantable Lead Location Detail 1 UNKNOWN    Implantable Lead Location Right Atrium    Implantable Lead  Medtronic    Implantable Lead  Model 6949 Fidel Sal    Implantable Lead Serial Number [id number]    Implantable Lead Implant Date 20060306    Implantable Lead Polarity Type Bipolar Lead    Implantable Lead Location Detail 1 UNKNOWN    Implantable Lead Location Right Ventricle    Marcos Setting Mode (NBG Code) MVP_AAI_DDD    Marcos Setting Lower Rate Limit 50    Marcos Setting Maximum Tracking Rate 130    Marcos Setting Maximum Sensor Rate 115    Marcos Setting Hysterisis Rate DISABLED    Marcos Setting TRISTEN Delay Low 350    Marcos Setting PAV Delay Low 350    Marcos Setting AT Mode Switch Rate 171    Lead Channel Setting Sensing Polarity Bipolar    Lead Channel Setting Sensing Anode Location Right Atrium    Lead Channel Setting Sensing Anode Terminal Ring    Lead Channel Setting Sensing Cathode Location Right Atrium    Lead Channel Setting Sensing Cathode Terminal Tip    Lead Channel Setting Sensing Sensitivity 0.3    Lead Channel Setting Sensing Polarity Bipolar    Lead Channel Setting Sensing Anode Location Right Ventricle    Lead Channel Setting Sensing Anode Terminal Ring    Lead Channel Setting Sensing Cathode Location Right Ventricle    Lead Channel Setting Sensing Cathode Terminal Tip    Lead Channel Setting Sensing Sensitivity 0.3    Lead Channel Setting Pacing Polarity Bipolar    Lead Channel Setting Pacing Anode Location Right Atrium    Lead Channel Setting Pacing Anode Terminal Ring    Lead Channel Setting Sensing Cathode Location Right Atrium    Lead Channel Setting Sensing Cathode Terminal Tip    Lead Channel Setting Pacing Pulse Width 0.4    Lead Channel Setting Pacing Amplitude 1.5    Lead Channel Setting Pacing Capture Mode Adaptive    Lead Channel Setting Pacing Polarity Bipolar    Lead Channel Setting Pacing Anode Location Right Ventricle    Lead Channel Setting Pacing Anode Terminal Ring    Lead Channel Setting Sensing Cathode Location Right Ventricle    Lead Channel Setting Sensing Cathode Terminal Tip    Lead Channel Setting  Pacing Pulse Width 0.4    Lead Channel Setting Pacing Amplitude 2.25    Lead Channel Setting Pacing Capture Mode Adaptive    Zone Setting Type Category VF    Zone Setting Detection Interval 320    Zone Setting Detection Beats Numerator 30    Zone Setting Detection Beats Denominator 40    Zone Setting Type Category VT    Zone Setting Detection Interval 280    Zone Setting Type Category VT    Zone Setting Detection Interval 350    Zone Setting Type Category VT    Zone Setting Detection Interval 400    Zone Setting Type Category ATRIAL_FIBRILLATION    Zone Setting Type Category AT/AF    Zone Setting Detection Interval 350    Lead Channel Impedance Value 342    Lead Channel Sensing Intrinsic Amplitude 0.9    Lead Channel Pacing Threshold Amplitude 0.5    Lead Channel Pacing Threshold Pulse Width 0.4    Lead Channel Impedance Value 399    Lead Channel Impedance Value 304    Lead Channel Sensing Intrinsic Amplitude 11.3    Lead Channel Pacing Threshold Amplitude 1    Lead Channel Pacing Threshold Pulse Width 0.4    Battery Date Time of Measurements 20220622143823    Battery Status OK    Battery RRT Trigger 2.727    Battery Remaining Longevity 65    Battery Voltage 2.98    Capacitor Charge Type Reformation    Capacitor Last Charge Date Time 20220528111827    Capacitor Charge Time 3.973    Capacitor Charge Energy 18    Marcos Statistic Date Time Start 20220518120059    Marcos Statistic Date Time End 20220622143552    Marcos Statistic RA Percent Paced 0.03    Marcos Statistic RV Percent Paced 0.07    Marcos Statistic AP  Percent 0    Marcos Statistic AS  Percent 0.06    Marcos Statistic AP VS Percent 0.03    Marcos Statistic AS VS Percent 99.91    Atrial Tachy Statistic Date Time Start 20220518120059    Atrial Tachy Statistic Date Time End 20220622143552    Atrial Tachy Statistic AT/AF Wall Percent 0    Therapy Statistic Recent Shocks Delivered 0    Therapy Statistic Recent Shocks Aborted 0    Therapy Statistic Recent ATP  Delivered 0    Therapy Statistic Recent Date Time Start 69684877310392    Therapy Statistic Recent Date Time End 14782764285994    Therapy Statistic Total Shocks Delivered 1    Therapy Statistic Total Shocks Aborted 0    Therapy Statistic Total ATP Delivered 0    Therapy Statistic Total  Date Time Start 89674088562411    Therapy Statistic Total  Date Time End 2619619714    Episode Statistic Recent Count 0    Episode Statistic Type Category AT/AF    Episode Statistic Recent Count 0    Episode Statistic Type Category SVT    Episode Statistic Recent Count 1    Episode Statistic Type Category VT    Episode Statistic Recent Count 0    Episode Statistic Type Category VF    Episode Statistic Recent Count 0    Episode Statistic Type Category VT    Episode Statistic Recent Count 0    Episode Statistic Type Category VT    Episode Statistic Recent Count 0    Episode Statistic Type Category VT    Episode Statistic Recent Date Time Start 11388277791794    Episode Statistic Recent Date Time End 21593957320229    Episode Statistic Recent Date Time Start 27945943813298    Episode Statistic Recent Date Time End 38548577570440    Episode Statistic Recent Date Time Start 31217801174080    Episode Statistic Recent Date Time End 39575959614225    Episode Statistic Recent Date Time Start 91693868590871    Episode Statistic Recent Date Time End 83881633150035    Episode Statistic Recent Date Time Start 51556997214883    Episode Statistic Recent Date Time End 24311924537156    Episode Statistic Recent Date Time Start 25726864987464    Episode Statistic Recent Date Time End 65292773942795    Episode Statistic Recent Date Time Start 65388286914822    Episode Statistic Recent Date Time End 33174348246541    Episode Statistic Total Count 2    Episode Statistic Type Category AT/AF    Episode Statistic Total Count 0    Episode Statistic Type Category SVT    Episode Statistic Total Count 9    Episode Statistic Type Category VT    Episode  Statistic Total Count 1    Episode Statistic Type Category VF    Episode Statistic Total Count 0    Episode Statistic Type Category VT    Episode Statistic Total Count 0    Episode Statistic Type Category VT    Episode Statistic Total Count 1    Episode Statistic Type Category VT    Episode Statistic Total Date Time Start 20180412151624    Episode Statistic Total Date Time End 24627264597036    Episode Statistic Total Date Time Start 90747227544467    Episode Statistic Total Date Time End 15952949162879    Episode Statistic Total Date Time Start 92397717665476    Episode Statistic Total Date Time End 47664323853332    Episode Statistic Total Date Time Start 20180412151624    Episode Statistic Total Date Time End 44218672655589    Episode Statistic Total Date Time Start 10661758109606    Episode Statistic Total Date Time End 36764551704718    Episode Statistic Total Date Time Start 20180412151624    Episode Statistic Total Date Time End 20220622143552    Episode Statistic Total Date Time Start 27281833783455    Episode Statistic Total Date Time End 66473610263173    Episode Identifier 25    Episode Type Category VT    Episode Date Time 14711172626275    Episode Duration 1    Narrative    Device: [employer] [id number] Evera XT DR  Normal Device Function.  Mode: AAI<=>DDD  bpm  AP: <0.1%  : <0.1%  Intrinsic rhythm: SR @ 82 bpm  Short V-V intervals: 0  Thoracic Impedance: Below reference line, suggesting possible fluid accumulation.  Lead Trends Appear Stable: Yes  Estimated battery longevity to RRT = 5.4 years. Battery voltage = 2.98 V.  Atrial arrhythmia: Device records 3 AT/AF episodes lasting < 1 min each with a total time in AT/AF = 5 seconds. No EGMs for further review.  AF burden: <0.1%  Anticoagulant: Heparin gtt  Ventricular Arrhythmia: 1 VT-NS episode recorded, lasting 1 second, at 200 bpm. This episode occurred on 6/6/22. No recent episodes correlating with rhythm seen on telemetry today by primary RN.    Setting changes: None    Plan: Continue inpatient evaluation and treatment.  RICKIE Mcpherson RN    Dual lead ICD    I have reviewed and interpreted the device interrogation, settings, programming and nurse's summary. The device is functioning within normal device parameters. I agree with the current findings, assessment and plan.   POC US Guidance Needle Placement    Impression    Superficial cervical plexus, supraclavicular block, pectoralis block    Cardiac Device Check - Inpatient   Result Value    Date Time Interrogation Session [id number]    Implantable Pulse Generator  Medtronic    Implantable Pulse Generator Model [id number] Evera XT DR    Implantable Pulse Generator Serial Number [id number]    Type Interrogation Session In Clinic    Clinic Name [facility]    Implantable Pulse Generator Type Defibrillator    Implantable Pulse Generator Implant Date 20180412    Implantable Lead  Medtronic    Implantable Lead Model 5076 CapSureFix Novus    Implantable Lead Serial Number [id number]    Implantable Lead Implant Date 20060306    Implantable Lead Polarity Type Bipolar Lead    Implantable Lead Location Detail 1 UNKNOWN    Implantable Lead Location Right Atrium    Implantable Lead  Medtronic    Implantable Lead Model 6949 Sprint Convoy    Implantable Lead Serial Number [id number]    Implantable Lead Implant Date 20060306    Implantable Lead Polarity Type Bipolar Lead    Implantable Lead Location Detail 1 UNKNOWN    Implantable Lead Location Right Ventricle    Marcos Setting Mode (NBG Code) DDD    Marcos Setting Lower Rate Limit 80    Marcos Setting Maximum Tracking Rate 130    Marcos Setting Maximum Sensor Rate 115    Marcos Setting Hysterisis Rate DISABLED    Marcos Setting TRISTEN Delay Low 350    Marcos Setting PAV Delay Low 350    Marcos Setting AT Mode Switch Rate 171    Lead Channel Setting Sensing Polarity Bipolar    Lead Channel Setting Sensing Anode Location  Right Atrium    Lead Channel Setting Sensing Anode Terminal Ring    Lead Channel Setting Sensing Cathode Location Right Atrium    Lead Channel Setting Sensing Cathode Terminal Tip    Lead Channel Setting Sensing Sensitivity 0.3    Lead Channel Setting Sensing Polarity Bipolar    Lead Channel Setting Sensing Anode Location Right Ventricle    Lead Channel Setting Sensing Anode Terminal Ring    Lead Channel Setting Sensing Cathode Location Right Ventricle    Lead Channel Setting Sensing Cathode Terminal Tip    Lead Channel Setting Sensing Sensitivity 0.3    Lead Channel Setting Pacing Polarity Bipolar    Lead Channel Setting Pacing Anode Location Right Atrium    Lead Channel Setting Pacing Anode Terminal Ring    Lead Channel Setting Sensing Cathode Location Right Atrium    Lead Channel Setting Sensing Cathode Terminal Tip    Lead Channel Setting Pacing Pulse Width 0.4    Lead Channel Setting Pacing Amplitude 1.5    Lead Channel Setting Pacing Capture Mode Adaptive    Lead Channel Setting Pacing Polarity Bipolar    Lead Channel Setting Pacing Anode Location Right Ventricle    Lead Channel Setting Pacing Anode Terminal Ring    Lead Channel Setting Sensing Cathode Location Right Ventricle    Lead Channel Setting Sensing Cathode Terminal Tip    Lead Channel Setting Pacing Pulse Width 0.4    Lead Channel Setting Pacing Amplitude 2.25    Lead Channel Setting Pacing Capture Mode Adaptive    Zone Setting Type Category VF    Zone Setting Detection Interval 320    Zone Setting Detection Beats Numerator 30    Zone Setting Detection Beats Denominator 40    Zone Setting Type Category VT    Zone Setting Detection Interval 280    Zone Setting Type Category VT    Zone Setting Detection Interval 350    Zone Setting Type Category VT    Zone Setting Detection Interval 400    Zone Setting Type Category ATRIAL_FIBRILLATION    Zone Setting Type Category AT/AF    Zone Setting Detection Interval 350    Lead Channel Impedance Value 304    Lead  Channel Sensing Intrinsic Amplitude 1.4    Lead Channel Pacing Threshold Amplitude 0.5    Lead Channel Pacing Threshold Pulse Width 0.4    Lead Channel Impedance Value 399    Lead Channel Impedance Value 285    Lead Channel Sensing Intrinsic Amplitude 11.1    Lead Channel Pacing Threshold Amplitude 1.00    Lead Channel Pacing Threshold Pulse Width 0.4    Battery Date Time of Measurements 20220623073722    Battery Status OK    Battery RRT Trigger 2.727    Battery Remaining Longevity 61    Battery Voltage 2.97    Capacitor Charge Type Reformation    Capacitor Last Charge Date Time 20220528111827    Capacitor Charge Time 3.973    Capacitor Charge Energy 18    Marcos Statistic Date Time Start 20220622143626    Marcos Statistic Date Time End 20220623073627    Marcos Statistic RA Percent Paced 0.01    Marcos Statistic RV Percent Paced 0.07    Marcos Statistic AP  Percent 0    Marcos Statistic AS  Percent 0.07    Marcos Statistic AP VS Percent 0.01    Marcos Statistic AS VS Percent 99.92    Atrial Tachy Statistic Date Time Start 97735320943934    Atrial Tachy Statistic Date Time End 20220623073627    Atrial Tachy Statistic AT/AF [place] Percent 0    Therapy Statistic Recent Shocks Delivered 0    Therapy Statistic Recent Shocks Aborted 0    Therapy Statistic Recent ATP Delivered 0    Therapy Statistic Recent Date Time Start 65814871345795    Therapy Statistic Recent Date Time End 49098574824210    Therapy Statistic Total Shocks Delivered 1    Therapy Statistic Total Shocks Aborted 0    Therapy Statistic Total ATP Delivered 0    Therapy Statistic Total  Date Time Start 58844504935605    Therapy Statistic Total  Date Time End 36890049343071    Episode Statistic Recent Count 0    Episode Statistic Type Category AT/AF    Episode Statistic Recent Count 0    Episode Statistic Type Category SVT    Episode Statistic Recent Count 0    Episode Statistic Type Category VT    Episode Statistic Recent Count 0    Episode Statistic Type  Category VF    Episode Statistic Recent Count 0    Episode Statistic Type Category VT    Episode Statistic Recent Count 0    Episode Statistic Type Category VT    Episode Statistic Recent Count 0    Episode Statistic Type Category VT    Episode Statistic Recent Date Time Start 22648671925989    Episode Statistic Recent Date Time End 77226018221060    Episode Statistic Recent Date Time Start 77235191579730    Episode Statistic Recent Date Time End 23788983190572    Episode Statistic Recent Date Time Start 80042647629604    Episode Statistic Recent Date Time End 27910136153525    Episode Statistic Recent Date Time Start 64164585850437    Episode Statistic Recent Date Time End 93109173497570    Episode Statistic Recent Date Time Start 97059835311705    Episode Statistic Recent Date Time End 19579988984285    Episode Statistic Recent Date Time Start 93441828282658    Episode Statistic Recent Date Time End 54953082987852    Episode Statistic Recent Date Time Start 56033143187776    Episode Statistic Recent Date Time End 33293662294672    Episode Statistic Total Count 2    Episode Statistic Type Category AT/AF    Episode Statistic Total Count 0    Episode Statistic Type Category SVT    Episode Statistic Total Count 9    Episode Statistic Type Category VT    Episode Statistic Total Count 1    Episode Statistic Type Category VF    Episode Statistic Total Count 0    Episode Statistic Type Category VT    Episode Statistic Total Count 0    Episode Statistic Type Category VT    Episode Statistic Total Count 1    Episode Statistic Type Category VT    Episode Statistic Total Date Time Start 73819309146616    Episode Statistic Total Date Time End 55428737072754    Episode Statistic Total Date Time Start 24487653153262    Episode Statistic Total Date Time End 29548932013584    Episode Statistic Total Date Time Start 98295852189735    Episode Statistic Total Date Time End 22177217686688    Episode Statistic Total Date Time Start  33514381178177    Episode Statistic Total Date Time End 20220623073627    Episode Statistic Total Date Time Start 94798802921175    Episode Statistic Total Date Time End 20220623073627    Episode Statistic Total Date Time Start 20180412151624    Episode Statistic Total Date Time End 20220623073627    Episode Statistic Total Date Time Start 29561201989303    Episode Statistic Total Date Time End 20220623073627    Narrative    Patient seen in OR [facility] for evaluation and iterative programming of their ICD per MD orders pre-op balloon pump insertion per Anesthesia request.     Device: GoLive! Mobile [id number] Evera XT   Normal device function.  Mode: AAI<=>DDD  bpm  AP: <0.1%  : <0.1%  Intrinsic rhythm: SR @ 97 bpm  Thoracic Impedance: Below reference line, suggesting possible intrathoracic fluid accumulation.  Short V-V intervals: 0  Lead Trends Appear Stable: Yes  Estimated battery longevity to RRT = 5.1 years. Battery voltage = 2.97 V.   Atrial Arrhythmia: None  AF [place]: 0%  Anticoagulant: Heparin gtt  Ventricular Arrhythmia: None  Setting Changes: Mode changed to DDD  bpm per Anesthesia. Tachy therapies programmed OFF.    Plan: Page Device RN post-op for reprogramming.  RICKIE Mcpherson, LITA    Dual lead ICD    I have reviewed and interpreted the device interrogation, settings, programming and nurse's summary. The device is functioning within normal device parameters. I agree with the current findings, assessment and plan.   XR Surgery TODD Fluoro L/T 5 Min w Stills    Narrative    This exam was marked as non-reportable because it will not be read by a   radiologist or a [place] non-radiologist provider.

## 2022-06-23 NOTE — PLAN OF CARE
Goal Outcome Evaluation:  Outcome Evaluation: Pt started experiencing epistaxis and hemaposis totaling 400 mL. Cards 2 cards to bedside. Bleeding uncontrolled with manual pressure and tipping head foward. (torso flat d/t IABP). Cards 2 consulted ENT. ENT came to bedside and placed surgicel, surgifoam, and surgiflow. Patient's SVR increased to greater than 2000 after dobutamine turned off earlier today. Nipride gtt started and scheudled PO hydralazine given.   Neuro:A&Ox4, Pt follows commands. Pt denies pain.  Resp: Pt on RA. Clear LS.  CV: NS w/ 1st AVB. IABP 1:1 100% augmentation. Map goal 65-75. Nipride on 2.25 mcg/kg/min. CVP low. Team aware.   : Low UO only 200 mL out total with 200 ml+ in bladder. Team aware.   GI: Loose BM x1.   Plan: OR @ 0730 for subclavian IABP placement. Pacemaker nurse to switch pacemaker to VOO mode.     Time 2200 0100 0400   CVP 5 4 2*   PA 40/22 34/18 28/19   SVo2 45 54 56   CI 1.9* 2.6 3.1   SVR 2145.1* 1209.2 957.6   *Team notified    For vital signs and complete assessments, please see documentation flowsheets.

## 2022-06-23 NOTE — ANESTHESIA PROCEDURE NOTES
Brachial plexus Procedure Note    Pre-Procedure   Staff -        Anesthesiologist:  Monty Randolph MD       Resident/Fellow: Cristi Juarez MD       Performed By: resident       Location: OR       Procedure Start/Stop Times: 6/23/2022 7:28 AM and 6/23/2022 7:29 AM       Pre-Anesthestic Checklist: patient identified, IV checked, site marked, risks and benefits discussed, informed consent, monitors and equipment checked, pre-op evaluation, at physician/surgeon's request and post-op pain management  Timeout:       Correct Patient: Yes        Correct Procedure: Yes        Correct Site: Yes        Correct Position: Yes        Correct Laterality: Yes        Site Marked: Yes  Procedure Documentation  Procedure: Brachial plexus       Diagnosis: POST OPERATIVE PAIN       Laterality: right       Patient Position: supine       Patient Prep/Sterile Barriers: sterile gloves, mask       Skin prep: Chloraprep (supraclavicular approach).       Needle Type: short bevel       Needle Gauge: 21.        Needle Length (millimeters): 110        Ultrasound guided       1. Ultrasound was used to identify targeted nerve, plexus, vascular marker, or fascial plane and place a needle adjacent to it in real-time.       2. Ultrasound was used to visualize the spread of anesthetic in close proximity to the above referenced structure.       3. A permanent image is entered into the patient's record.    Assessment/Narrative         The placement was negative for: blood aspirated, painful injection and site bleeding       Paresthesias: No.       Bolus given via needle..        Secured via.        Insertion/Infusion Method: Single Shot       Complications: none       Injection made incrementally with aspirations every 5 mL.    Medication(s) Administered   Bupivacaine 0.5% w/ 1:400K Epi (Injection) - Injection   15 mL - 6/23/2022 7:28:00 AM  Medication Administration Time: 6/23/2022 7:28 AM

## 2022-06-23 NOTE — ANESTHESIA PROCEDURE NOTES
Pectoralis Procedure Note    Pre-Procedure   Staff -        Anesthesiologist:  Monty Randolph MD       Resident/Fellow: Cristi Juarez MD       Performed By: resident       Location: OR       Procedure Start/Stop Times: 6/23/2022 7:28 AM and 6/23/2022 7:31 AM       Pre-Anesthestic Checklist: patient identified, IV checked, site marked, risks and benefits discussed, informed consent, monitors and equipment checked, pre-op evaluation, at physician/surgeon's request and post-op pain management  Timeout:       Correct Patient: Yes        Correct Procedure: Yes        Correct Site: Yes        Correct Position: Yes        Correct Laterality: Yes        Site Marked: Yes  Procedure Documentation  Procedure: Pectoralis       Diagnosis: POST OPERATIVE PAIN       Laterality: right       Patient Position: supine       Patient Prep/Sterile Barriers: sterile gloves, mask       Skin prep: Chloraprep             Pectoralis I and Pectoralis II       Needle Type: short bevel       Needle Gauge: 21.        Needle Length (millimeters): 110        Ultrasound guided       1. Ultrasound was used to identify targeted nerve, plexus, vascular marker, or fascial plane and place a needle adjacent to it in real-time.       2. Ultrasound was used to visualize the spread of anesthetic in close proximity to the above referenced structure.       3. A permanent image is entered into the patient's record.    Assessment/Narrative         The placement was negative for: blood aspirated, painful injection and site bleeding       Paresthesias: No.       Bolus given via needle..        Secured via.        Insertion/Infusion Method: Single Shot       Complications: none       Injection made incrementally with aspirations every 5 mL.    Medication(s) Administered   Bupivacaine 0.5% w/ 1:400K Epi (Injection) - Injection   15 mL - 6/23/2022 7:28:00 AM  Medication Administration Time: 6/23/2022 7:28 AM     Comments:  10 ml pec 1 , 5 ml pec 2

## 2022-06-23 NOTE — ANESTHESIA PROCEDURE NOTES
Cervical Plexus Procedure Note    Pre-Procedure   Staff -        Anesthesiologist:  Monty Randolph MD       Resident/Fellow: Cristi Juarez MD       Performed By: resident       Location: OR       Procedure Start/Stop Times: 6/23/2022 7:26 AM and 6/23/2022 7:27 AM       Pre-Anesthestic Checklist: patient identified, IV checked, site marked, risks and benefits discussed, informed consent, monitors and equipment checked, pre-op evaluation, at physician/surgeon's request and post-op pain management  Timeout:       Correct Patient: Yes        Correct Procedure: Yes        Correct Site: Yes        Correct Position: Yes        Correct Laterality: Yes        Site Marked: Yes  Procedure Documentation  Procedure: Cervical Plexus       Diagnosis: POST OPERATIVE PAIN       Laterality: right       Patient Position: supine       Patient Prep/Sterile Barriers: sterile gloves, mask       Skin prep: Chloraprep       Needle Type: short bevel       Needle Gauge: 21.        Needle Length (millimeters): 110        Ultrasound guided       1. Ultrasound was used to identify targeted nerve, plexus, vascular marker, or fascial plane and place a needle adjacent to it in real-time.       2. Ultrasound was used to visualize the spread of anesthetic in close proximity to the above referenced structure.       3. A permanent image is entered into the patient's record.    Assessment/Narrative         The placement was negative for: blood aspirated, painful injection and site bleeding       Paresthesias: No.       Bolus given via needle..        Secured via.        Insertion/Infusion Method: Single Shot       Complications: none       Injection made incrementally with aspirations every 5 mL.    Medication(s) Administered   Bupivacaine 0.5% w/ 1:400K Epi (Injection) - Injection   10 mL - 6/23/2022 7:26:00 AM  Medication Administration Time: 6/23/2022 7:26 AM

## 2022-06-23 NOTE — OP NOTE
OPERATIVE DATE: 6/23/2022    PRE-OPERATIVE DIAGNOSIS:  1) Ischemic cardiomyopathy  2) End stage heart failure  3) Cardiogenic shock  4) Acute on chronic systolic heart failure  5) Ventricular tachycardia  6) Mild to moderate emphysema  7) Deep venous thrombosis  8) Pulmonary embolism  9) Lupus  10) Gastroesophageal reflux disease    POST-OPERATIVE DIAGNOSIS:  1) Ischemic cardiomyopathy  2) End stage heart failure  3) Cardiogenic shock  4) Acute on chronic systolic heart failure  5) Ventricular tachycardia  6) Mild to moderate emphysema  7) Deep venous thrombosis  8) Pulmonary embolism  9) Lupus  10) Gastroesophageal reflux disease    PROCEDURE:  1) Right subclavian chimney graft - 8mm Gelweave graft with 9F sheath  2) Placement of 50 ml intra-aortic balloon pump with fluoroscopy and interpretation    SURGEON: Hayden Veras MD    ASSISTANT: Ann Alonzo MD    ANESTHESIA: GETA    ESTIMATED BLOOD LOSS: 10 mL    OPERATIVE FINDINGS:  1) IABP placed at left main stem bronchus under fluoroscopy    INDICATIONS:  Mr. Dandy Sands is a 61 year old year old male admitted with cardiogenic shock and ischemic cardiomyopathy now with end stage heart failure. We were asked to evaluate for repositioning of the femoral IABP to the subclavian to increase mobility. Risks and benefits of the operation were explained to the patient and their family including, but not limited to, bleeding, infection, stroke and even death. They understood these risks and agreed to proceed electively.     OPERATIVE REPORT:  The patient was transferred to the operating room and positioned supine on the OR table. General anesthesia was initiated by the anesthesia team. Endotracheal intubation and central venous access was performed by anesthesia. The patients neck, chest, abdomen and bilateral lower extremities were clipped, prepped and draped in sterile fashion. A pre-procedure time-out was performed confirming the correct patient, correct site and  correct procedure.     A right infraclavicular incision was made. The axillary artery was exposed and 8000 units IV heparin was administered. The artery was clamped with vascular clamps. An arteriotomy was made. The 8mm Gelweave graft was anastomosed to the artery in end-to-side fashion using running 5-0 prolene. The anastomosis was tested by removing the clamps and hemostatic. Next a 9F Terumo non-reinforced sheath was secured in the end of the Gelweave graft.     A separate inferior and lateral percutaneous puncture was used to introduce the wire into the infraclavicular wound. The graft was tunneled to have a gentle lie below the pectoralis major. Fluoroscopy with interpretation was used to position the guidewire in the descending thoracic aorta. A 6 Fr JR-4 catheter was used to place an 0.025 J wire using Seldinger technique into the descending thoracic aorta and passed into the abdominal aorta. The femoral IABP was removed after it was turned off and the gas line opened to air. This was pulled directly through the 9 Fr sheath which was left in place. The catheter was removed and exchanged for the IABP over the wire. Balloon pump augmentation was initiated with good waveforms.     The axillary incision was closed in layers using vicryl stitches. The IABP was secured with sutures and adhesive dressings. A Biopatch and Tegaderm occlusive dressing were used to dress the IABP insertion site.     The patient was then transferred from the operating bed to an ICU bed and transferred to the ICU in critical, but stable, condition.     All needle, sponge and instrument counts were correct at the end of the case.    Hayden Veras MD  Cardiothoracic Surgery  [phone]

## 2022-06-23 NOTE — ANESTHESIA POSTPROCEDURE EVALUATION
Patient: Dandy Sands    Procedure: Procedure(s):  INSERTION, INTRA-AORTIC BALLOON PUMP RIGHT SUBCLAVIAN ARTERY.       Anesthesia Type:  MAC    Note:  Disposition: ICU            ICU Sign Out: Anesthesiologist/ICU physician sign out WAS performed   Postop Pain Control: Uneventful            Sign Out: Well controlled pain   PONV: No   Neuro/Psych: Uneventful            Sign Out: Acceptable/Baseline neuro status   Airway/Respiratory: Uneventful            Sign Out: Acceptable/Baseline resp. status   CV/Hemodynamics: Uneventful            Sign Out: Acceptable CV status; No obvious hypovolemia; No obvious fluid overload   Other NRE:    DID A NON-ROUTINE EVENT OCCUR?            Last vitals:  Vitals:    06/23/22 1130 06/23/22 1145 06/23/22 1200   BP:      Pulse: 90 89 89   Resp: 26 22 19   Temp:      SpO2: 100% 99% 96%       Electronically Signed By: Anastasiia Victoria MD  June 23, 2022  12:11 PM

## 2022-06-23 NOTE — ANESTHESIA PREPROCEDURE EVALUATION
Anesthesia Pre-Procedure Evaluation    Patient: Dandy Sands   MRN: [id number] : 1960        Procedure : Procedure(s):  INSERTION, INTRA-AORTIC BALLOON PUMP RIGHT SUBCLAVIAN ARTERY.          No past medical history on file.   Past Surgical History:   Procedure Laterality Date     COLONOSCOPY N/A 2022    Procedure: COLONOSCOPY;  Surgeon: Parth Meneses MD;  Location: [facility] OR     CV CORONARY ANGIOGRAM N/A 2022    Procedure: Coronary Angiogram;  Surgeon: Mike Pope MD;  Location: [facility]     CV CORONARY ANGIOGRAM N/A 2022    Procedure: Coronary Angiogram;  Surgeon: Austin Bright MD;  Location: [facility]     CV CORONARY LITHOTRIPSY PCI Bilateral 2022    Procedure: Percutaneous Coronary Intervention - Lithotripsy;  Surgeon: Mike Pope MD;  Location: [facility]     CV INTRAVASULAR ULTRASOUND N/A 2022    Procedure: Intravascular Ultrasound;  Surgeon: Mike Pope MD;  Location: [facility]     CV PCI ANGIOPLASTY N/A 2022    Procedure: Percutaneous Transluminal Angioplasty;  Surgeon: Austin Bright MD;  Location: [facility]     CV PCI STENT DRUG ELUTING N/A 2022    Procedure: Percutaneous Coronary Intervention Stent;  Surgeon: Mike Pope MD;  Location: [facility]     CV RIGHT HEART CATH MEASUREMENTS RECORDED N/A 2022    Procedure: Right Heart Catheterization;  Surgeon: Justo Stewart MD;  Location: [facility]     CV RIGHT HEART CATH MEASUREMENTS RECORDED N/A 2022    Procedure: Right Heart Catheterization;  Surgeon: Mike Pope MD;  Location: [facility]     CV RIGHT HEART CATH MEASUREMENTS RECORDED N/A 2022    Procedure: Right Heart Catheterization;  Surgeon: Austin Bright MD;  Location: [facility]     CV RIGHT HEART EXERCISE STRESS STUDY N/A 2022    Procedure: Stress  Drug Study;  Surgeon: Justo Stewart MD;  Location:  HEART CARDIAC CATH LAB     PICC DOUBLE LUMEN PLACEMENT Right 05/28/2022    right basilic 5 fr dl picc 40 cm      No Known Allergies   Social History     Tobacco Use     Smoking status: Not on file     Smokeless tobacco: Not on file   Substance Use Topics     Alcohol use: Not on file      Wt Readings from Last 1 Encounters:   06/23/22 66.3 kg (146 lb 2.6 oz)              OUTSIDE LABS:  CBC:   Lab Results   Component Value Date    WBC 8.7 06/23/2022    WBC 6.3 06/23/2022    HGB 7.9 (L) 06/23/2022    HGB 7.9 (L) 06/23/2022    HCT 26.3 (L) 06/23/2022    HCT 26.3 (L) 06/23/2022     06/23/2022     06/23/2022     BMP:   Lab Results   Component Value Date     (L) 06/23/2022     06/23/2022    POTASSIUM 4.7 06/23/2022    POTASSIUM 4.4 06/23/2022    CHLORIDE 104 06/23/2022    CHLORIDE 103 06/23/2022    CO2 22 06/23/2022    CO2 22 06/23/2022    BUN 21.2 06/23/2022    BUN 18.6 06/23/2022    CR 0.86 06/23/2022    CR 0.69 06/23/2022     (H) 06/23/2022     (H) 06/23/2022     COAGS:   Lab Results   Component Value Date    PTT 53 (H) 05/16/2022    INR 1.24 (H) 06/23/2022     POC: No results found for: BGM, HCG, HCGS  HEPATIC:   Lab Results   Component Value Date    ALBUMIN 3.1 (L) 06/23/2022    PROTTOTAL 5.8 (L) 06/23/2022     (H) 06/23/2022     (H) 06/23/2022    ALKPHOS 102 06/23/2022    BILITOTAL 0.4 06/23/2022     OTHER:   Lab Results   Component Value Date    LACT 0.8 06/23/2022    A1C 5.5 05/20/2022    ELDA 8.7 (L) 06/23/2022    PHOS 3.7 06/23/2022    MAG 2.4 (H) 06/23/2022    TSH 1.65 05/20/2022    CRP 28.0 (H) 06/19/2022    SED 39 (H) 06/19/2022       Anesthesia Plan    ASA Status:  4      Anesthesia Type: MAC.   Induction: Intravenous.   Maintenance: TIVA.        Consents            Postoperative Care    Pain management: IV analgesics.        Comments:                Anastasiia Victoria MD

## 2022-06-23 NOTE — PLAN OF CARE
Goal Outcome Evaluation:    Plan of Care Reviewed With: patient, daughter     Overall Patient Progress: improving    Major Shift Events: OR for moving femoral to subclavian IABP from 0730 - 1030. R femoral sheath removed by Dr. Alexander at 1200 and remained on bedrest until 1700. Lift to the chair after bedrest completed. Dobutamine 2.5 started given Svo2 of 40 which improved to 54. Plan for AZUL q6hour overnight. Re-started nipride gtt at 1800 for MAPs in 80-90s and augmenting 140-150, continues at 0.2 for MAP goal 60-70. Heparin restarted at 1100 units/hour at 1800.   Plan: Work with PT/OT awaiting donor heart. Continue with plan of care & update primary team with changes.   For vital signs and complete assessments, please see documentation flowsheets.

## 2022-06-23 NOTE — CONSULTS
Otolaryngology Consult Note  June 22, 2022  CC: epistaxis    HPI: Dandy Sands is a 61 year old male with a past medical history of CAD (s/p prior PCI), ischemic cardiomyopathy (LVEF 15%), recurrent DVT/PE, antiphospholipid syndrome (on chronic anticoagulation via warfarin), HTN, HLD who was transferred from [place] on 6/17 for management of cardiogenic shock. He has been on a heparin drip with plans of undergoing a subclavian IABP tomorrow. He began having profuse epistaxis from the left nare this evening as well as bloody emesis and now spitting up blood. Heparin was held upon onset of epistaxis.  There was approximately 400 ml of bloody emesis in the blue bag on arrival. Of note, prior to his transfer here, he had one severe epistaxis that occurred at [place] and required cauterization.     No past medical history on file.    Past Surgical History:   Procedure Laterality Date     COLONOSCOPY N/A 05/23/2022    Procedure: COLONOSCOPY;  Surgeon: Parth Meneses MD;  Location: [facility] OR     CV CORONARY ANGIOGRAM N/A 5/24/2022    Procedure: Coronary Angiogram;  Surgeon: Mike Pope MD;  Location:  HEART CARDIAC CATH LAB     CV CORONARY ANGIOGRAM N/A 5/29/2022    Procedure: Coronary Angiogram;  Surgeon: Austin Bright MD;  Location:  HEART CARDIAC CATH LAB     CV CORONARY LITHOTRIPSY PCI Bilateral 5/24/2022    Procedure: Percutaneous Coronary Intervention - Lithotripsy;  Surgeon: Mike Pope MD;  Location: [facility] CARDIAC CATH LAB     CV INTRAVASULAR ULTRASOUND N/A 5/24/2022    Procedure: Intravascular Ultrasound;  Surgeon: Mike Pope MD;  Location:  HEART CARDIAC CATH LAB     CV PCI ANGIOPLASTY N/A 5/29/2022    Procedure: Percutaneous Transluminal Angioplasty;  Surgeon: Austin Bright MD;  Location: [facility] CARDIAC CATH LAB     CV PCI STENT DRUG ELUTING N/A 5/24/2022    Procedure: Percutaneous Coronary Intervention Stent;  Surgeon: Mike Pope MD;  Location:  [facility] CARDI[facility] CATH [facility]     CV RIGHT HEART CATH MEASUREMENTS RECORDED N/A 05/18/2022    Procedure: Right Heart Catheterization;  Surgeon: Justo Stewart MD;  [facility]     CV RIGHT HEART CATH MEASUREMENTS RECORDED N/A 5/24/2022    Procedure: Right Heart Catheterization;  Surgeon: Mike Pope MD;  [facility]     CV RIGHT HEART CATH MEASUREMENTS RECORDED N/A 5/29/2022    Procedure: Right Heart Catheterization;  Surgeon: Austin Bright MD;  [facility]     CV RIGHT HEART EXERCISE STRESS STUDY N/A 05/18/2022    Procedure: Stress Drug Study;  Surgeon: Justo Stewart MD;  Location:  HEART CARDIAC CATH LAB     PICC DOUBLE LUMEN PLACEMENT Right 05/28/2022    right basilic 5 fr dl picc 40 cm       No current outpatient medications on file.        No Known Allergies    Social History     Socioeconomic History     Marital status: Single     Spouse name: Not on file     Number of children: Not on file     Years of education: Not on file     Highest education level: Not on file   Occupational History     Not on file   Tobacco Use     Smoking status: Not on file     Smokeless tobacco: Not on file   Substance and Sexual Activity     Alcohol use: Not on file     Drug use: Not on file     Sexual activity: Not on file   Other Topics Concern     Not on file   Social History Narrative     Not on file     Social Determinants of Health     Financial Resource Strain: Not on file   Food Insecurity: Not on file   Transportation Needs: Not on file   Physical Activity: Not on file   Stress: Not on file   Social Connections: Not on file   Intimate Partner Violence: Not on file   Housing Stability: Not on file       No family history on file.    ROS: 12 point review of systems is negative unless noted in HPI.    PHYSICAL EXAM:  /76   Pulse 96   Temp 97.6  F (36.4  C) (Oral)   Resp 22   Ht 1.702 m (5' 7\")   Wt 66.8 kg (147 lb 4.3 oz)   SpO2 98%   " BMI 23.07 kg/m    General: laying in bed with dried blood along lower face and chest  HEAD: normocephalic  Face: symmetrical  Eyes: EOMI   Ears: external ears symmetric  Nose: see below  Mouth: moist, tongue soft  Oropharynx: symmetric palatal elevation, active slow bleeding down posterior oropharynx visualized, some clots seen and suctioned out.  Neck: no LAD, trachea midline   Neuro: cranial nerves 2-12 grossly intact  Respiratory: breathing non-labored on RA, no stridor  Psych: pleasant affect  Cardio: extremities warm and well perfused     NASAL ENDOSCOPY:  Due to epistaxis, nasal endoscopy was indicated. After obtaining consent, the rigid zero degree scope was passed under endoscopic vision through the right nasal passage. The nasal mucosa was pink and moist, sensation intact throughout. No bleeding noted in the right nasal cavity. Approximately 1cm septal perforation visualized with some dried scab along superior aspect in left nasal passage. The scope was then passed through the left nasal passage, trickled bleeding noted from superior lateral nasal wall just superior to the inferior turbinate. Some slow oozing along nasal floor. No bleeding noted posteriorly. Blood and clots suctioned from nasopharynx. The left nasal cavity was then packed with surgifoam wrapped in surgicel, one large piece placed along the nasal floor and a second piece was placed superiorly to apply pressure along the lateral nasal wall. The nasal cavity was then coated with surgiflo. The patient was then observed for 15 minutes with no further epistaxis noted. He gargled ice water four times and no further blood was seen dripping down the posterior oropharynx.    ROUTINE IP LABS (Last four results)  BMP  Recent Labs   Lab 06/22/22  1552 06/21/22  2205 06/21/22  1624 06/21/22  1619 06/21/22  0952    131* 136  --  138   POTASSIUM 4.3 4.0 4.4  --  4.3   CHLORIDE 108* 102 104  --  107   ELDA 8.7* 8.4* 9.0  --  9.0   CO2 22 23 24  --  25    BUN 10.2 12.7 11.5  --  9   CR 0.71 0.72 0.81  --  0.79   GLC 94 114* 116* 120* 139*     CBC  Recent Labs   Lab 06/22/22  0834 06/21/22  1624 06/21/22  0349 06/20/22  1551   WBC 5.3 5.0 5.3 5.3   RBC 3.14* 2.97* 3.05* 3.15*   HGB 9.0* 8.7* 8.8* 9.0*   HCT 30.1* 28.5* 29.0* 29.7*   MCV 96 96 95 94   MCH 28.7 29.3 28.9 28.6   MCHC 29.9* 30.5* 30.3* 30.3*   RDW 18.5* 18.4* 18.8* 18.8*    209 242 224     INR  Recent Labs   Lab 06/22/22  0520 06/21/22  0349 06/20/22  0355 06/19/22  0357   INR 1.20* 1.28* 1.36* 1.49*       Imaging:  None pertinent    Assessment and Plan  Dandy Sands is a 61 year old male with a past medical history of CAD (s/p prior PCI), ischemic cardiomyopathy (LVEF 15%), recurrent DVT/PE, antiphospholipid syndrome (on chronic anticoagulation via warfarin), HTN, HLD admitted for cardiogenic shock now with epistaxis isolated to left nasal cavity, noted to come from lateral nasal sidewall just superior to inferior turbinate as well as nasal floor, there is a known septal perforation that did not appear to be bleeding.     - Given the amount of bloody emesis (filling two emesis basins ~ 400cc) would recommend repeat Hgb at midnight.  - Left nasal cavity packed with all absorbable packing - surgifoam + surgicel + surgiflo. No antibiotics are indicated due to absorbable packing.  - Begin with nasal saline q3h while awake starting TOMORROW to help keep the nasal mucosa moist  - Some slow oozing is to be expected, if begins to  in quantity of bleeding would recommend spraying the nasal cavity copiously with afrin and holding pressure over the soft part of the nose for 20 minutes continuously. Nasal clips are ineffective and should not be used in place of digital pressure. If bleeding continues despite these measures, repeat. If bleeding continues or is severe please contact ENT.  - Ok to resume heparin if needed from otolaryngology standpoint.  - Remainder of care per primary team, please do  not hesitate to contact ENT with questions/concerns    Patient and plan was discussed with staff Dr. Matson.    Placido Elias MD  Otolaryngology Head and Neck Surgery Resident, PGY-3  Please page on call Otolaryngology resident with questions.      Physician Attestation   This patient was discussed with me. Agree with management with dissolvable packing. If bleeding resumes persistently, please contact Otolaryngology.    Alma Rosa Matson MD

## 2022-06-23 NOTE — ANESTHESIA CARE TRANSFER NOTE
Patient: Dandy Sands    Procedure: Procedure(s):  INSERTION, INTRA-AORTIC BALLOON PUMP RIGHT SUBCLAVIAN ARTERY.       Diagnosis: Cardiogenic shock (H) [R57.0]  Diagnosis Additional Information: No value filed.    Anesthesia Type:   No value filed.     Note:    Oropharynx: oropharynx clear of all foreign objects and spontaneously breathing  Level of Consciousness: drowsy  Oxygen Supplementation: face mask  Level of Supplemental Oxygen (L/min / FiO2): 5  Independent Airway: airway patency satisfactory and stable  Dentition: dentition unchanged  Vital Signs Stable: post-procedure vital signs reviewed and stable  Report to RN Given: handoff report given  Patient transferred to: ICU    ICU Handoff: Call for PAUSE to initiate/utilize ICU HANDOFF, Identified Patient, Identified Responsible Provider, Reviewed the Pertinent Medical History, Discussed Surgical Course, Reviewed Intra-OP Anesthesia Management and Issues during Anesthesia, Set Expectations for Post Procedure Period and Allowed Opportunity for Questions and Acknowledgement of Understanding      Vitals:  Vitals Value Taken Time   BP 74/52    Temp 37.2    Pulse 87 06/23/22 1032   Resp 21 06/23/22 1032   SpO2 100 % 06/23/22 1032   Vitals shown include unvalidated device data.    Electronically Signed By: YAO Perez CRNA  June 23, 2022  10:34 AM

## 2022-06-24 ENCOUNTER — APPOINTMENT (OUTPATIENT)
Dept: GENERAL RADIOLOGY | Facility: CLINIC | Age: 62
DRG: 001 | End: 2022-06-24
Attending: INTERNAL MEDICINE
Payer: COMMERCIAL

## 2022-06-24 ENCOUNTER — COMMITTEE REVIEW (OUTPATIENT)
Dept: TRANSPLANT | Facility: CLINIC | Age: 62
End: 2022-06-24

## 2022-06-24 LAB
ALBUMIN SERPL BCG-MCNC: 3 G/DL (ref 3.5–5.2)
ALBUMIN SERPL BCG-MCNC: 3.2 G/DL (ref 3.5–5.2)
ALBUMIN SERPL BCG-MCNC: 3.7 G/DL (ref 3.5–5.2)
ALBUMIN SERPL BCG-MCNC: 3.9 G/DL (ref 3.5–5.2)
ALP SERPL-CCNC: 101 U/L (ref 40–129)
ALP SERPL-CCNC: 102 U/L (ref 40–129)
ALP SERPL-CCNC: 92 U/L (ref 40–129)
ALP SERPL-CCNC: 93 U/L (ref 40–129)
ALT SERPL W P-5'-P-CCNC: 242 U/L (ref 10–50)
ALT SERPL W P-5'-P-CCNC: 273 U/L (ref 10–50)
ALT SERPL W P-5'-P-CCNC: 274 U/L (ref 10–50)
ALT SERPL W P-5'-P-CCNC: 282 U/L (ref 10–50)
ANION GAP SERPL CALCULATED.3IONS-SCNC: 11 MMOL/L (ref 7–15)
ANION GAP SERPL CALCULATED.3IONS-SCNC: 14 MMOL/L (ref 7–15)
ANION GAP SERPL CALCULATED.3IONS-SCNC: 9 MMOL/L (ref 7–15)
ANION GAP SERPL CALCULATED.3IONS-SCNC: 9 MMOL/L (ref 7–15)
AST SERPL W P-5'-P-CCNC: 63 U/L (ref 10–50)
AST SERPL W P-5'-P-CCNC: 71 U/L (ref 10–50)
AST SERPL W P-5'-P-CCNC: 71 U/L (ref 10–50)
AST SERPL W P-5'-P-CCNC: 79 U/L (ref 10–50)
BASE EXCESS BLDV CALC-SCNC: -0.3 MMOL/L (ref -7.7–1.9)
BASE EXCESS BLDV CALC-SCNC: -1.2 MMOL/L (ref -7.7–1.9)
BASE EXCESS BLDV CALC-SCNC: -1.6 MMOL/L (ref -7.7–1.9)
BASE EXCESS BLDV CALC-SCNC: -2.3 MMOL/L (ref -7.7–1.9)
BASE EXCESS BLDV CALC-SCNC: 0.3 MMOL/L (ref -7.7–1.9)
BASE EXCESS BLDV CALC-SCNC: 0.9 MMOL/L (ref -7.7–1.9)
BILIRUB SERPL-MCNC: 0.5 MG/DL
BILIRUB SERPL-MCNC: 0.5 MG/DL
BILIRUB SERPL-MCNC: 0.6 MG/DL
BILIRUB SERPL-MCNC: 0.6 MG/DL
BUN SERPL-MCNC: 12.8 MG/DL (ref 8–23)
BUN SERPL-MCNC: 13 MG/DL (ref 8–23)
BUN SERPL-MCNC: 13.3 MG/DL (ref 8–23)
BUN SERPL-MCNC: 15.6 MG/DL (ref 8–23)
CALCIUM SERPL-MCNC: 8.5 MG/DL (ref 8.8–10.2)
CALCIUM SERPL-MCNC: 8.6 MG/DL (ref 8.8–10.2)
CALCIUM SERPL-MCNC: 8.6 MG/DL (ref 8.8–10.2)
CALCIUM SERPL-MCNC: 8.9 MG/DL (ref 8.8–10.2)
CHLORIDE SERPL-SCNC: 100 MMOL/L (ref 98–107)
CHLORIDE SERPL-SCNC: 98 MMOL/L (ref 98–107)
CHLORIDE SERPL-SCNC: 99 MMOL/L (ref 98–107)
CHLORIDE SERPL-SCNC: 99 MMOL/L (ref 98–107)
CREAT SERPL-MCNC: 0.57 MG/DL (ref 0.67–1.17)
CREAT SERPL-MCNC: 0.62 MG/DL (ref 0.67–1.17)
CREAT SERPL-MCNC: 0.64 MG/DL (ref 0.67–1.17)
CREAT SERPL-MCNC: 0.73 MG/DL (ref 0.67–1.17)
DEPRECATED HCO3 PLAS-SCNC: 20 MMOL/L (ref 22–29)
DEPRECATED HCO3 PLAS-SCNC: 22 MMOL/L (ref 22–29)
DEPRECATED HCO3 PLAS-SCNC: 22 MMOL/L (ref 22–29)
DEPRECATED HCO3 PLAS-SCNC: 23 MMOL/L (ref 22–29)
ERYTHROCYTE [DISTWIDTH] IN BLOOD BY AUTOMATED COUNT: 17.6 % (ref 10–15)
FERRITIN SERPL-MCNC: 365 NG/ML (ref 31–409)
GFR SERPL CREATININE-BSD FRML MDRD: >90 ML/MIN/1.73M2
GLUCOSE SERPL-MCNC: 133 MG/DL (ref 70–99)
GLUCOSE SERPL-MCNC: 134 MG/DL (ref 70–99)
GLUCOSE SERPL-MCNC: 136 MG/DL (ref 70–99)
GLUCOSE SERPL-MCNC: 94 MG/DL (ref 70–99)
HCO3 BLDV-SCNC: 19 MMOL/L (ref 21–28)
HCO3 BLDV-SCNC: 23 MMOL/L (ref 21–28)
HCO3 BLDV-SCNC: 23 MMOL/L (ref 21–28)
HCO3 BLDV-SCNC: 24 MMOL/L (ref 21–28)
HCO3 BLDV-SCNC: 25 MMOL/L (ref 21–28)
HCO3 BLDV-SCNC: 25 MMOL/L (ref 21–28)
HCT VFR BLD AUTO: 23.2 % (ref 40–53)
HGB BLD-MCNC: 7.1 G/DL (ref 13.3–17.7)
HGB BLD-MCNC: 7.5 G/DL (ref 13.3–17.7)
INR PPP: 1.42 (ref 0.85–1.15)
IRON BINDING CAPACITY (ROCHE): 260 UG/DL (ref 240–430)
IRON SATN MFR SERPL: 16 % (ref 15–46)
IRON SERPL-MCNC: 42 UG/DL (ref 61–157)
LACTATE SERPL-SCNC: 0.6 MMOL/L (ref 0.7–2)
LACTATE SERPL-SCNC: 0.8 MMOL/L (ref 0.7–2)
LACTATE SERPL-SCNC: 0.9 MMOL/L (ref 0.7–2)
MAGNESIUM SERPL-MCNC: 1.9 MG/DL (ref 1.7–2.3)
MAGNESIUM SERPL-MCNC: 4.6 MG/DL (ref 1.7–2.3)
MCH RBC QN AUTO: 29.3 PG (ref 26.5–33)
MCHC RBC AUTO-ENTMCNC: 30.6 G/DL (ref 31.5–36.5)
MCV RBC AUTO: 96 FL (ref 78–100)
MDC_IDC_LEAD_IMPLANT_DT: NORMAL
MDC_IDC_LEAD_LOCATION: NORMAL
MDC_IDC_LEAD_LOCATION_DETAIL_1: NORMAL
MDC_IDC_LEAD_MFG: NORMAL
MDC_IDC_LEAD_MODEL: NORMAL
MDC_IDC_LEAD_POLARITY_TYPE: NORMAL
MDC_IDC_LEAD_SERIAL: NORMAL
MDC_IDC_MSMT_BATTERY_DTM: NORMAL
MDC_IDC_MSMT_BATTERY_DTM: NORMAL
MDC_IDC_MSMT_BATTERY_REMAINING_LONGEVITY: 61 MO
MDC_IDC_MSMT_BATTERY_REMAINING_LONGEVITY: 61 MO
MDC_IDC_MSMT_BATTERY_RRT_TRIGGER: 2.73
MDC_IDC_MSMT_BATTERY_RRT_TRIGGER: 2.73
MDC_IDC_MSMT_BATTERY_STATUS: NORMAL
MDC_IDC_MSMT_BATTERY_STATUS: NORMAL
MDC_IDC_MSMT_BATTERY_VOLTAGE: 2.96 V
MDC_IDC_MSMT_BATTERY_VOLTAGE: 2.97 V
MDC_IDC_MSMT_CAP_CHARGE_DTM: NORMAL
MDC_IDC_MSMT_CAP_CHARGE_DTM: NORMAL
MDC_IDC_MSMT_CAP_CHARGE_ENERGY: 18 J
MDC_IDC_MSMT_CAP_CHARGE_ENERGY: 18 J
MDC_IDC_MSMT_CAP_CHARGE_TIME: 3.97
MDC_IDC_MSMT_CAP_CHARGE_TIME: 3.97
MDC_IDC_MSMT_CAP_CHARGE_TYPE: NORMAL
MDC_IDC_MSMT_CAP_CHARGE_TYPE: NORMAL
MDC_IDC_MSMT_LEADCHNL_RA_IMPEDANCE_VALUE: 304 OHM
MDC_IDC_MSMT_LEADCHNL_RA_IMPEDANCE_VALUE: 304 OHM
MDC_IDC_MSMT_LEADCHNL_RA_PACING_THRESHOLD_AMPLITUDE: 0.5 V
MDC_IDC_MSMT_LEADCHNL_RA_PACING_THRESHOLD_AMPLITUDE: 0.5 V
MDC_IDC_MSMT_LEADCHNL_RA_PACING_THRESHOLD_PULSEWIDTH: 0.4 MS
MDC_IDC_MSMT_LEADCHNL_RA_PACING_THRESHOLD_PULSEWIDTH: 0.4 MS
MDC_IDC_MSMT_LEADCHNL_RA_SENSING_INTR_AMPL: 1.38 MV
MDC_IDC_MSMT_LEADCHNL_RA_SENSING_INTR_AMPL: 1.4 MV
MDC_IDC_MSMT_LEADCHNL_RV_IMPEDANCE_VALUE: 285 OHM
MDC_IDC_MSMT_LEADCHNL_RV_IMPEDANCE_VALUE: 304 OHM
MDC_IDC_MSMT_LEADCHNL_RV_IMPEDANCE_VALUE: 399 OHM
MDC_IDC_MSMT_LEADCHNL_RV_IMPEDANCE_VALUE: 399 OHM
MDC_IDC_MSMT_LEADCHNL_RV_PACING_THRESHOLD_AMPLITUDE: 1 V
MDC_IDC_MSMT_LEADCHNL_RV_PACING_THRESHOLD_AMPLITUDE: 1 V
MDC_IDC_MSMT_LEADCHNL_RV_PACING_THRESHOLD_PULSEWIDTH: 0.4 MS
MDC_IDC_MSMT_LEADCHNL_RV_PACING_THRESHOLD_PULSEWIDTH: 0.4 MS
MDC_IDC_MSMT_LEADCHNL_RV_SENSING_INTR_AMPL: 11.1 MV
MDC_IDC_MSMT_LEADCHNL_RV_SENSING_INTR_AMPL: 11.1 MV
MDC_IDC_PG_IMPLANT_DTM: NORMAL
MDC_IDC_PG_IMPLANT_DTM: NORMAL
MDC_IDC_PG_MFG: NORMAL
MDC_IDC_PG_MFG: NORMAL
MDC_IDC_PG_MODEL: NORMAL
MDC_IDC_PG_MODEL: NORMAL
MDC_IDC_PG_SERIAL: NORMAL
MDC_IDC_PG_SERIAL: NORMAL
MDC_IDC_PG_TYPE: NORMAL
MDC_IDC_PG_TYPE: NORMAL
MDC_IDC_SESS_CLINIC_NAME: NORMAL
MDC_IDC_SESS_CLINIC_NAME: NORMAL
MDC_IDC_SESS_DTM: NORMAL
MDC_IDC_SESS_DTM: NORMAL
MDC_IDC_SESS_TYPE: NORMAL
MDC_IDC_SESS_TYPE: NORMAL
MDC_IDC_SET_BRADY_AT_MODE_SWITCH_RATE: 171 {BEATS}/MIN
MDC_IDC_SET_BRADY_AT_MODE_SWITCH_RATE: 171 {BEATS}/MIN
MDC_IDC_SET_BRADY_HYSTRATE: NORMAL
MDC_IDC_SET_BRADY_HYSTRATE: NORMAL
MDC_IDC_SET_BRADY_LOWRATE: 50 {BEATS}/MIN
MDC_IDC_SET_BRADY_LOWRATE: 80 {BEATS}/MIN
MDC_IDC_SET_BRADY_MAX_SENSOR_RATE: 115 {BEATS}/MIN
MDC_IDC_SET_BRADY_MAX_SENSOR_RATE: 115 {BEATS}/MIN
MDC_IDC_SET_BRADY_MAX_TRACKING_RATE: 130 {BEATS}/MIN
MDC_IDC_SET_BRADY_MAX_TRACKING_RATE: 130 {BEATS}/MIN
MDC_IDC_SET_BRADY_MODE: NORMAL
MDC_IDC_SET_BRADY_MODE: NORMAL
MDC_IDC_SET_BRADY_PAV_DELAY_LOW: 350 MS
MDC_IDC_SET_BRADY_PAV_DELAY_LOW: 350 MS
MDC_IDC_SET_BRADY_SAV_DELAY_LOW: 350 MS
MDC_IDC_SET_BRADY_SAV_DELAY_LOW: 350 MS
MDC_IDC_SET_LEADCHNL_RA_PACING_AMPLITUDE: 1.5 V
MDC_IDC_SET_LEADCHNL_RA_PACING_AMPLITUDE: 1.5 V
MDC_IDC_SET_LEADCHNL_RA_PACING_ANODE_ELECTRODE_1: NORMAL
MDC_IDC_SET_LEADCHNL_RA_PACING_ANODE_ELECTRODE_1: NORMAL
MDC_IDC_SET_LEADCHNL_RA_PACING_ANODE_LOCATION_1: NORMAL
MDC_IDC_SET_LEADCHNL_RA_PACING_ANODE_LOCATION_1: NORMAL
MDC_IDC_SET_LEADCHNL_RA_PACING_CAPTURE_MODE: NORMAL
MDC_IDC_SET_LEADCHNL_RA_PACING_CAPTURE_MODE: NORMAL
MDC_IDC_SET_LEADCHNL_RA_PACING_CATHODE_ELECTRODE_1: NORMAL
MDC_IDC_SET_LEADCHNL_RA_PACING_CATHODE_ELECTRODE_1: NORMAL
MDC_IDC_SET_LEADCHNL_RA_PACING_CATHODE_LOCATION_1: NORMAL
MDC_IDC_SET_LEADCHNL_RA_PACING_CATHODE_LOCATION_1: NORMAL
MDC_IDC_SET_LEADCHNL_RA_PACING_POLARITY: NORMAL
MDC_IDC_SET_LEADCHNL_RA_PACING_POLARITY: NORMAL
MDC_IDC_SET_LEADCHNL_RA_PACING_PULSEWIDTH: 0.4 MS
MDC_IDC_SET_LEADCHNL_RA_PACING_PULSEWIDTH: 0.4 MS
MDC_IDC_SET_LEADCHNL_RA_SENSING_ANODE_ELECTRODE_1: NORMAL
MDC_IDC_SET_LEADCHNL_RA_SENSING_ANODE_ELECTRODE_1: NORMAL
MDC_IDC_SET_LEADCHNL_RA_SENSING_ANODE_LOCATION_1: NORMAL
MDC_IDC_SET_LEADCHNL_RA_SENSING_ANODE_LOCATION_1: NORMAL
MDC_IDC_SET_LEADCHNL_RA_SENSING_CATHODE_ELECTRODE_1: NORMAL
MDC_IDC_SET_LEADCHNL_RA_SENSING_CATHODE_ELECTRODE_1: NORMAL
MDC_IDC_SET_LEADCHNL_RA_SENSING_CATHODE_LOCATION_1: NORMAL
MDC_IDC_SET_LEADCHNL_RA_SENSING_CATHODE_LOCATION_1: NORMAL
MDC_IDC_SET_LEADCHNL_RA_SENSING_POLARITY: NORMAL
MDC_IDC_SET_LEADCHNL_RA_SENSING_POLARITY: NORMAL
MDC_IDC_SET_LEADCHNL_RA_SENSING_SENSITIVITY: 0.3 MV
MDC_IDC_SET_LEADCHNL_RA_SENSING_SENSITIVITY: 0.3 MV
MDC_IDC_SET_LEADCHNL_RV_PACING_AMPLITUDE: 2.25 V
MDC_IDC_SET_LEADCHNL_RV_PACING_AMPLITUDE: 2.25 V
MDC_IDC_SET_LEADCHNL_RV_PACING_ANODE_ELECTRODE_1: NORMAL
MDC_IDC_SET_LEADCHNL_RV_PACING_ANODE_ELECTRODE_1: NORMAL
MDC_IDC_SET_LEADCHNL_RV_PACING_ANODE_LOCATION_1: NORMAL
MDC_IDC_SET_LEADCHNL_RV_PACING_ANODE_LOCATION_1: NORMAL
MDC_IDC_SET_LEADCHNL_RV_PACING_CAPTURE_MODE: NORMAL
MDC_IDC_SET_LEADCHNL_RV_PACING_CAPTURE_MODE: NORMAL
MDC_IDC_SET_LEADCHNL_RV_PACING_CATHODE_ELECTRODE_1: NORMAL
MDC_IDC_SET_LEADCHNL_RV_PACING_CATHODE_ELECTRODE_1: NORMAL
MDC_IDC_SET_LEADCHNL_RV_PACING_CATHODE_LOCATION_1: NORMAL
MDC_IDC_SET_LEADCHNL_RV_PACING_CATHODE_LOCATION_1: NORMAL
MDC_IDC_SET_LEADCHNL_RV_PACING_POLARITY: NORMAL
MDC_IDC_SET_LEADCHNL_RV_PACING_POLARITY: NORMAL
MDC_IDC_SET_LEADCHNL_RV_PACING_PULSEWIDTH: 0.4 MS
MDC_IDC_SET_LEADCHNL_RV_PACING_PULSEWIDTH: 0.4 MS
MDC_IDC_SET_LEADCHNL_RV_SENSING_ANODE_ELECTRODE_1: NORMAL
MDC_IDC_SET_LEADCHNL_RV_SENSING_ANODE_ELECTRODE_1: NORMAL
MDC_IDC_SET_LEADCHNL_RV_SENSING_ANODE_LOCATION_1: NORMAL
MDC_IDC_SET_LEADCHNL_RV_SENSING_ANODE_LOCATION_1: NORMAL
MDC_IDC_SET_LEADCHNL_RV_SENSING_CATHODE_ELECTRODE_1: NORMAL
MDC_IDC_SET_LEADCHNL_RV_SENSING_CATHODE_ELECTRODE_1: NORMAL
MDC_IDC_SET_LEADCHNL_RV_SENSING_CATHODE_LOCATION_1: NORMAL
MDC_IDC_SET_LEADCHNL_RV_SENSING_CATHODE_LOCATION_1: NORMAL
MDC_IDC_SET_LEADCHNL_RV_SENSING_POLARITY: NORMAL
MDC_IDC_SET_LEADCHNL_RV_SENSING_POLARITY: NORMAL
MDC_IDC_SET_LEADCHNL_RV_SENSING_SENSITIVITY: 0.3 MV
MDC_IDC_SET_LEADCHNL_RV_SENSING_SENSITIVITY: 0.3 MV
MDC_IDC_SET_ZONE_DETECTION_BEATS_DENOMINATOR: 40 {BEATS}
MDC_IDC_SET_ZONE_DETECTION_BEATS_DENOMINATOR: 40 {BEATS}
MDC_IDC_SET_ZONE_DETECTION_BEATS_NUMERATOR: 30 {BEATS}
MDC_IDC_SET_ZONE_DETECTION_BEATS_NUMERATOR: 30 {BEATS}
MDC_IDC_SET_ZONE_DETECTION_INTERVAL: 280 MS
MDC_IDC_SET_ZONE_DETECTION_INTERVAL: 280 MS
MDC_IDC_SET_ZONE_DETECTION_INTERVAL: 320 MS
MDC_IDC_SET_ZONE_DETECTION_INTERVAL: 320 MS
MDC_IDC_SET_ZONE_DETECTION_INTERVAL: 350 MS
MDC_IDC_SET_ZONE_DETECTION_INTERVAL: 400 MS
MDC_IDC_SET_ZONE_DETECTION_INTERVAL: 400 MS
MDC_IDC_SET_ZONE_TYPE: NORMAL
MDC_IDC_STAT_AT_BURDEN_PERCENT: 0 %
MDC_IDC_STAT_AT_BURDEN_PERCENT: 0 %
MDC_IDC_STAT_AT_DTM_END: NORMAL
MDC_IDC_STAT_AT_DTM_END: NORMAL
MDC_IDC_STAT_AT_DTM_START: NORMAL
MDC_IDC_STAT_AT_DTM_START: NORMAL
MDC_IDC_STAT_BRADY_AP_VP_PERCENT: 0 %
MDC_IDC_STAT_BRADY_AP_VP_PERCENT: 0 %
MDC_IDC_STAT_BRADY_AP_VS_PERCENT: 0.01 %
MDC_IDC_STAT_BRADY_AP_VS_PERCENT: 0.15 %
MDC_IDC_STAT_BRADY_AS_VP_PERCENT: 0.06 %
MDC_IDC_STAT_BRADY_AS_VP_PERCENT: 0.07 %
MDC_IDC_STAT_BRADY_AS_VS_PERCENT: 99.79 %
MDC_IDC_STAT_BRADY_AS_VS_PERCENT: 99.92 %
MDC_IDC_STAT_BRADY_DTM_END: NORMAL
MDC_IDC_STAT_BRADY_DTM_END: NORMAL
MDC_IDC_STAT_BRADY_DTM_START: NORMAL
MDC_IDC_STAT_BRADY_DTM_START: NORMAL
MDC_IDC_STAT_BRADY_RA_PERCENT_PACED: 0.01 %
MDC_IDC_STAT_BRADY_RA_PERCENT_PACED: 0.15 %
MDC_IDC_STAT_BRADY_RV_PERCENT_PACED: 0.06 %
MDC_IDC_STAT_BRADY_RV_PERCENT_PACED: 0.07 %
MDC_IDC_STAT_EPISODE_RECENT_COUNT: 0
MDC_IDC_STAT_EPISODE_RECENT_COUNT_DTM_END: NORMAL
MDC_IDC_STAT_EPISODE_RECENT_COUNT_DTM_START: NORMAL
MDC_IDC_STAT_EPISODE_TOTAL_COUNT: 0
MDC_IDC_STAT_EPISODE_TOTAL_COUNT: 1
MDC_IDC_STAT_EPISODE_TOTAL_COUNT: 2
MDC_IDC_STAT_EPISODE_TOTAL_COUNT: 2
MDC_IDC_STAT_EPISODE_TOTAL_COUNT: 9
MDC_IDC_STAT_EPISODE_TOTAL_COUNT: 9
MDC_IDC_STAT_EPISODE_TOTAL_COUNT_DTM_END: NORMAL
MDC_IDC_STAT_EPISODE_TOTAL_COUNT_DTM_START: NORMAL
MDC_IDC_STAT_EPISODE_TYPE: NORMAL
MDC_IDC_STAT_TACHYTHERAPY_ATP_DELIVERED_RECENT: 0
MDC_IDC_STAT_TACHYTHERAPY_ATP_DELIVERED_RECENT: 0
MDC_IDC_STAT_TACHYTHERAPY_ATP_DELIVERED_TOTAL: 0
MDC_IDC_STAT_TACHYTHERAPY_ATP_DELIVERED_TOTAL: 0
MDC_IDC_STAT_TACHYTHERAPY_RECENT_DTM_END: NORMAL
MDC_IDC_STAT_TACHYTHERAPY_RECENT_DTM_END: NORMAL
MDC_IDC_STAT_TACHYTHERAPY_RECENT_DTM_START: NORMAL
MDC_IDC_STAT_TACHYTHERAPY_RECENT_DTM_START: NORMAL
MDC_IDC_STAT_TACHYTHERAPY_SHOCKS_ABORTED_RECENT: 0
MDC_IDC_STAT_TACHYTHERAPY_SHOCKS_ABORTED_RECENT: 0
MDC_IDC_STAT_TACHYTHERAPY_SHOCKS_ABORTED_TOTAL: 0
MDC_IDC_STAT_TACHYTHERAPY_SHOCKS_ABORTED_TOTAL: 0
MDC_IDC_STAT_TACHYTHERAPY_SHOCKS_DELIVERED_RECENT: 0
MDC_IDC_STAT_TACHYTHERAPY_SHOCKS_DELIVERED_RECENT: 0
MDC_IDC_STAT_TACHYTHERAPY_SHOCKS_DELIVERED_TOTAL: 1
MDC_IDC_STAT_TACHYTHERAPY_SHOCKS_DELIVERED_TOTAL: 1
MDC_IDC_STAT_TACHYTHERAPY_TOTAL_DTM_END: NORMAL
MDC_IDC_STAT_TACHYTHERAPY_TOTAL_DTM_END: NORMAL
MDC_IDC_STAT_TACHYTHERAPY_TOTAL_DTM_START: NORMAL
MDC_IDC_STAT_TACHYTHERAPY_TOTAL_DTM_START: NORMAL
O2/TOTAL GAS SETTING VFR VENT: 21 %
O2/TOTAL GAS SETTING VFR VENT: 24 %
O2/TOTAL GAS SETTING VFR VENT: 24 %
OXYHGB MFR BLDV: 47 % (ref 70–75)
OXYHGB MFR BLDV: 53 % (ref 70–75)
OXYHGB MFR BLDV: 56 % (ref 70–75)
OXYHGB MFR BLDV: 56 % (ref 70–75)
OXYHGB MFR BLDV: 59 % (ref 70–75)
OXYHGB MFR BLDV: 99 % (ref 70–75)
PCO2 BLDV: 21 MM HG (ref 40–50)
PCO2 BLDV: 34 MM HG (ref 40–50)
PCO2 BLDV: 35 MM HG (ref 40–50)
PCO2 BLDV: 36 MM HG (ref 40–50)
PCO2 BLDV: 36 MM HG (ref 40–50)
PCO2 BLDV: 39 MM HG (ref 40–50)
PH BLDV: 7.41 [PH] (ref 7.32–7.43)
PH BLDV: 7.41 [PH] (ref 7.32–7.43)
PH BLDV: 7.43 [PH] (ref 7.32–7.43)
PH BLDV: 7.45 [PH] (ref 7.32–7.43)
PH BLDV: 7.45 [PH] (ref 7.32–7.43)
PH BLDV: 7.58 [PH] (ref 7.32–7.43)
PHOSPHATE SERPL-MCNC: 3.6 MG/DL (ref 2.5–4.5)
PHOSPHATE SERPL-MCNC: 3.7 MG/DL (ref 2.5–4.5)
PLATELET # BLD AUTO: 207 10E3/UL (ref 150–450)
PO2 BLDV: 143 MM HG (ref 25–47)
PO2 BLDV: 27 MM HG (ref 25–47)
PO2 BLDV: 30 MM HG (ref 25–47)
PO2 BLDV: 31 MM HG (ref 25–47)
PO2 BLDV: 32 MM HG (ref 25–47)
PO2 BLDV: 34 MM HG (ref 25–47)
POTASSIUM SERPL-SCNC: 3.6 MMOL/L (ref 3.4–5.3)
POTASSIUM SERPL-SCNC: 3.8 MMOL/L (ref 3.4–5.3)
POTASSIUM SERPL-SCNC: 4 MMOL/L (ref 3.4–5.3)
POTASSIUM SERPL-SCNC: 4 MMOL/L (ref 3.4–5.3)
PROT SERPL-MCNC: 5.7 G/DL (ref 6.4–8.3)
PROT SERPL-MCNC: 5.8 G/DL (ref 6.4–8.3)
PROT SERPL-MCNC: 6.1 G/DL (ref 6.4–8.3)
PROT SERPL-MCNC: 6.1 G/DL (ref 6.4–8.3)
RBC # BLD AUTO: 2.42 10E6/UL (ref 4.4–5.9)
SODIUM SERPL-SCNC: 130 MMOL/L (ref 136–145)
SODIUM SERPL-SCNC: 130 MMOL/L (ref 136–145)
SODIUM SERPL-SCNC: 132 MMOL/L (ref 136–145)
SODIUM SERPL-SCNC: 134 MMOL/L (ref 136–145)
UFH PPP CHRO-ACNC: 0.2 IU/ML
UFH PPP CHRO-ACNC: 0.43 IU/ML
UFH PPP CHRO-ACNC: 0.59 IU/ML
WBC # BLD AUTO: 9.8 10E3/UL (ref 4–11)

## 2022-06-24 PROCEDURE — 200N000002 HC R&B ICU UMMC

## 2022-06-24 PROCEDURE — 82805 BLOOD GASES W/O2 SATURATION: CPT

## 2022-06-24 PROCEDURE — 999N000075 HC STATISTIC IABP MONITORING

## 2022-06-24 PROCEDURE — 250N000013 HC RX MED GY IP 250 OP 250 PS 637: Performed by: INTERNAL MEDICINE

## 2022-06-24 PROCEDURE — 80053 COMPREHEN METABOLIC PANEL: CPT | Performed by: STUDENT IN AN ORGANIZED HEALTH CARE EDUCATION/TRAINING PROGRAM

## 2022-06-24 PROCEDURE — 84450 TRANSFERASE (AST) (SGOT): CPT | Performed by: STUDENT IN AN ORGANIZED HEALTH CARE EDUCATION/TRAINING PROGRAM

## 2022-06-24 PROCEDURE — 250N000011 HC RX IP 250 OP 636: Performed by: STUDENT IN AN ORGANIZED HEALTH CARE EDUCATION/TRAINING PROGRAM

## 2022-06-24 PROCEDURE — 85027 COMPLETE CBC AUTOMATED: CPT | Performed by: STUDENT IN AN ORGANIZED HEALTH CARE EDUCATION/TRAINING PROGRAM

## 2022-06-24 PROCEDURE — 83550 IRON BINDING TEST: CPT | Performed by: STUDENT IN AN ORGANIZED HEALTH CARE EDUCATION/TRAINING PROGRAM

## 2022-06-24 PROCEDURE — 999N000155 HC STATISTIC RAPCV CVP MONITORING

## 2022-06-24 PROCEDURE — 85520 HEPARIN ASSAY: CPT | Performed by: INTERNAL MEDICINE

## 2022-06-24 PROCEDURE — 999N000065 XR CHEST PORT 1 VIEW

## 2022-06-24 PROCEDURE — 250N000013 HC RX MED GY IP 250 OP 250 PS 637: Performed by: STUDENT IN AN ORGANIZED HEALTH CARE EDUCATION/TRAINING PROGRAM

## 2022-06-24 PROCEDURE — 84100 ASSAY OF PHOSPHORUS: CPT | Performed by: INTERNAL MEDICINE

## 2022-06-24 PROCEDURE — 71045 X-RAY EXAM CHEST 1 VIEW: CPT | Mod: 26 | Performed by: RADIOLOGY

## 2022-06-24 PROCEDURE — 250N000011 HC RX IP 250 OP 636: Performed by: INTERNAL MEDICINE

## 2022-06-24 PROCEDURE — 74018 RADEX ABDOMEN 1 VIEW: CPT

## 2022-06-24 PROCEDURE — 82805 BLOOD GASES W/O2 SATURATION: CPT | Performed by: STUDENT IN AN ORGANIZED HEALTH CARE EDUCATION/TRAINING PROGRAM

## 2022-06-24 PROCEDURE — 85018 HEMOGLOBIN: CPT | Performed by: STUDENT IN AN ORGANIZED HEALTH CARE EDUCATION/TRAINING PROGRAM

## 2022-06-24 PROCEDURE — 83605 ASSAY OF LACTIC ACID: CPT | Performed by: STUDENT IN AN ORGANIZED HEALTH CARE EDUCATION/TRAINING PROGRAM

## 2022-06-24 PROCEDURE — 84238 ASSAY NONENDOCRINE RECEPTOR: CPT | Performed by: STUDENT IN AN ORGANIZED HEALTH CARE EDUCATION/TRAINING PROGRAM

## 2022-06-24 PROCEDURE — 250N000013 HC RX MED GY IP 250 OP 250 PS 637

## 2022-06-24 PROCEDURE — 74018 RADEX ABDOMEN 1 VIEW: CPT | Mod: 26 | Performed by: STUDENT IN AN ORGANIZED HEALTH CARE EDUCATION/TRAINING PROGRAM

## 2022-06-24 PROCEDURE — 83735 ASSAY OF MAGNESIUM: CPT | Performed by: INTERNAL MEDICINE

## 2022-06-24 PROCEDURE — 999N000045 HC STATISTIC DAILY SWAN MONITORING

## 2022-06-24 PROCEDURE — 85610 PROTHROMBIN TIME: CPT | Performed by: INTERNAL MEDICINE

## 2022-06-24 PROCEDURE — 83605 ASSAY OF LACTIC ACID: CPT

## 2022-06-24 PROCEDURE — 85520 HEPARIN ASSAY: CPT | Performed by: STUDENT IN AN ORGANIZED HEALTH CARE EDUCATION/TRAINING PROGRAM

## 2022-06-24 PROCEDURE — 82728 ASSAY OF FERRITIN: CPT | Performed by: STUDENT IN AN ORGANIZED HEALTH CARE EDUCATION/TRAINING PROGRAM

## 2022-06-24 PROCEDURE — 84155 ASSAY OF PROTEIN SERUM: CPT | Performed by: INTERNAL MEDICINE

## 2022-06-24 PROCEDURE — 99291 CRITICAL CARE FIRST HOUR: CPT | Mod: GC | Performed by: INTERNAL MEDICINE

## 2022-06-24 RX ORDER — FENTANYL CITRATE 50 UG/ML
25 INJECTION, SOLUTION INTRAMUSCULAR; INTRAVENOUS ONCE
Status: COMPLETED | OUTPATIENT
Start: 2022-06-24 | End: 2022-06-24

## 2022-06-24 RX ORDER — BUMETANIDE 0.25 MG/ML
2 INJECTION INTRAMUSCULAR; INTRAVENOUS ONCE
Status: COMPLETED | OUTPATIENT
Start: 2022-06-24 | End: 2022-06-24

## 2022-06-24 RX ORDER — OXYCODONE HYDROCHLORIDE 5 MG/1
5 TABLET ORAL ONCE
Status: COMPLETED | OUTPATIENT
Start: 2022-06-24 | End: 2022-06-24

## 2022-06-24 RX ORDER — POTASSIUM CHLORIDE 29.8 MG/ML
20 INJECTION INTRAVENOUS ONCE
Status: COMPLETED | OUTPATIENT
Start: 2022-06-24 | End: 2022-06-24

## 2022-06-24 RX ORDER — MAGNESIUM SULFATE HEPTAHYDRATE 40 MG/ML
2 INJECTION, SOLUTION INTRAVENOUS ONCE
Status: COMPLETED | OUTPATIENT
Start: 2022-06-24 | End: 2022-06-25

## 2022-06-24 RX ADMIN — DULOXETINE 30 MG: 30 CAPSULE, DELAYED RELEASE ORAL at 08:20

## 2022-06-24 RX ADMIN — TRAZODONE HYDROCHLORIDE 100 MG: 50 TABLET ORAL at 21:13

## 2022-06-24 RX ADMIN — MAGNESIUM SULFATE IN WATER 2 G: 40 INJECTION, SOLUTION INTRAVENOUS at 23:38

## 2022-06-24 RX ADMIN — MAGNESIUM SULFATE IN WATER 2 G: 40 INJECTION, SOLUTION INTRAVENOUS at 00:48

## 2022-06-24 RX ADMIN — SALINE NASAL SPRAY 2 SPRAY: 1.5 SOLUTION NASAL at 02:18

## 2022-06-24 RX ADMIN — LIDOCAINE PATCH 4% 2 PATCH: 40 PATCH TOPICAL at 20:37

## 2022-06-24 RX ADMIN — ACETAMINOPHEN 325 MG: 325 TABLET, FILM COATED ORAL at 02:09

## 2022-06-24 RX ADMIN — ACETAMINOPHEN 325 MG: 325 TABLET, FILM COATED ORAL at 23:05

## 2022-06-24 RX ADMIN — ACETAMINOPHEN 325 MG: 325 TABLET, FILM COATED ORAL at 18:45

## 2022-06-24 RX ADMIN — HYDROXYCHLOROQUINE SULFATE 200 MG: 200 TABLET, FILM COATED ORAL at 20:41

## 2022-06-24 RX ADMIN — FLUTICASONE FUROATE AND VILANTEROL TRIFENATATE 1 PUFF: 100; 25 POWDER RESPIRATORY (INHALATION) at 08:21

## 2022-06-24 RX ADMIN — SALINE NASAL SPRAY 2 SPRAY: 1.5 SOLUTION NASAL at 20:41

## 2022-06-24 RX ADMIN — ACETAMINOPHEN 325 MG: 325 TABLET, FILM COATED ORAL at 06:18

## 2022-06-24 RX ADMIN — HYDROXYCHLOROQUINE SULFATE 200 MG: 200 TABLET, FILM COATED ORAL at 08:20

## 2022-06-24 RX ADMIN — TAMSULOSIN HYDROCHLORIDE 0.4 MG: 0.4 CAPSULE ORAL at 08:20

## 2022-06-24 RX ADMIN — BUMETANIDE 2 MG: 0.25 INJECTION, SOLUTION INTRAMUSCULAR; INTRAVENOUS at 12:47

## 2022-06-24 RX ADMIN — OXYCODONE HYDROCHLORIDE 5 MG: 5 TABLET ORAL at 23:05

## 2022-06-24 RX ADMIN — THIAMINE HCL TAB 100 MG 100 MG: 100 TAB at 08:20

## 2022-06-24 RX ADMIN — ASPIRIN 81 MG CHEWABLE TABLET 81 MG: 81 TABLET CHEWABLE at 08:20

## 2022-06-24 RX ADMIN — Medication 10 MG: at 21:13

## 2022-06-24 RX ADMIN — SALINE NASAL SPRAY 2 SPRAY: 1.5 SOLUTION NASAL at 14:22

## 2022-06-24 RX ADMIN — FENTANYL CITRATE 25 MCG: 50 INJECTION, SOLUTION INTRAMUSCULAR; INTRAVENOUS at 02:08

## 2022-06-24 RX ADMIN — POTASSIUM CHLORIDE 20 MEQ: 29.8 INJECTION, SOLUTION INTRAVENOUS at 22:17

## 2022-06-24 RX ADMIN — Medication 325 MG: at 11:26

## 2022-06-24 RX ADMIN — OXYCODONE HYDROCHLORIDE 5 MG: 5 TABLET ORAL at 01:21

## 2022-06-24 ASSESSMENT — ACTIVITIES OF DAILY LIVING (ADL)
ADLS_ACUITY_SCORE: 32

## 2022-06-24 NOTE — PLAN OF CARE
Patient's MAP reading 20s on balloon pump and arterial line (that we knew later that it was actually hooked up to the balloon pump itself erroneously).  Patient was only having some dizziness, epinephrine 0.2 and levophed was started. He received a total of 250 ml LR.  A cuff pressure was taken, and it was 120/80. Arterial line reading disconnected from screen, and levophed was weaned off based on cuff BP numbers.    CXR showed IABP in the right position.    Updated Dr. Lora of tonight's events    Nipride restarted later in the night for MAPs in the 95-97 range with a goal of MAP 70-80.    Addendum 4 AM: swan balloon not inflating anymore. Pulled swan gilma back. Will inform team that will likely need to be replaced.    Elvin Armendariz MD  Cardiovascular disease fellow  [facility]  [phone]  06/23/2022 10:13 PM

## 2022-06-24 NOTE — PLAN OF CARE
"Goal Outcome Evaluation:  Overall Patient Progress: improving    Outcome Evaluation: Around 2000 writer titrated Nipride from 0.4 to 0.5 mcg/kg/min to achieve map goal. Pt's IABP maps droppedfrom 80s to 20s and pt said he felt lightheaded for a short while, but was able to converse with staff. Pt given 250 mL of LR 0.2 mg of epi and Levo gtt started. NIBP maps low 100s when checked. Levo turned off after a short while. IABP rep called who recomended pulling back 3 mL of blood and hard flushing the line and flushing the line with the pressure bag. (Ecmo tech/Circ completed). The first time it seemed to improved the IABP pressure/ bring back the \"chair\" ballon pressure waveform. Subsequent attempts were not secessful to increase the IABP map pressure. More of night Maps for IABP in 20s-40s. Cards 2 aware. Nipride restarted. Cards 2 also repositioned Pt's swan several times tonight r/t poor CVP waveform.  Neuro: A&ox4. Pt able to move all extremities and follow commands. Pt complained of incisional pain around 0100. Given 975mg acetaminophen, 10mg oxy, and 25 mcg of fentanyl.   Resp: RA till sleeping, then placed on 1 LPM oxymask for destats. Course LS.   CV: NS w/ 1 st AVB. AAI<=>DDD /115. HR 80s to low 100s. Map goal 70-80. NIBP maps all above 65. 1:1 100% augmentation subclavian IABP. Dobutamine 2.5 mcg/kg/min. Nipride 0.1 mcg/kg/min.  : Pt voids spontaneously. Average UO based on 5 voids was 65 mL/hr.   GI: No BM overnight. Some abd discomfort while lying flat.   Skin/IV: [place] now @ 39.   Plan: Subclavian IABP plan. Team to consider replacing [place] and replacing cortis with swan on days d/t issues with flushing sideport.     Time 2200 0100 0400   CVP 6 17 (Bad waveform)  17 (Bad waveform)   PA 45/17 44/21 42/24   SVo2 45 56 59   CI 2.4 3.4 3.5   SVR 1677.3 605.4 638.7    Nipride restarted         For vital signs and complete assessments, please see documentation flowsheets.       "

## 2022-06-24 NOTE — PROGRESS NOTES
Cardiology Progress Note 6/23/22    Assessment & Plan   61-year-old man with history of ischemic cardiomyopathy LVEF 15%) NYHA class IV symptoms ACC stage D, multiple admissions with heart failure over the past several months. Presented with cardiogenic shock c/b multi-organ failure (JOSE; Acute liver injury/shock liver) requiring mechanical hemodynamic support. Now listed as status 2 for OHT. Course c/b large epistaxis 6/22 with 400 ml blood. Had subclavian IABP placed instead of femoral IABP 6/23/22. Hemoglobin gradually downtrending without e/o further e/o bleeding. Continue to optimize his hemodynamics while awaiting for heart transplant.     Changes Today:  - work up for iron deficiency - pending, monitor for bleeding, switched to pediatric tube + limit blood draw  - keep Hgb above 6-6.5; need to be discussed with our attending prior to getting transfusion  - s/p subclavian IABP 6/23/22, swan repositioned today. [place] & IABP in position upon repeat Xray.   - IABP MAP not accurate, currently base his blood pressure on his cuff pressure  - continue dobutamine 2.5, IABP 1:1  - wean off nipride  - continue hemodynamic-guided diuresis -- no diuretic given today  - repeated PRA 6/24 still inprocess(last received transfusion 6/17)    ===============  CARDIOVASCULAR  ===============  # Ambulatory cardiogenic shock SCAI D  # Advanced ischemic cardiomyopathy, Class IV, Stage D  # s/p CRT-D (Medtronic 2006, gen change 2012)  # SVT  # Nonsustained VT  # CAD s/p PCI to LAD (2005)  # Hx of MI (2007)  # Severe ostial LAD disease with in-stent restenosis of mid LAD at diag bifurcation, severe proximal RCA disease, mild circ disease now s/p ostial LAD and proximal RCA lithotripsy and stenting on 5/24/22  CMRI showing infarcted myocardium in LAD territory. Given the degree of LV dysfunction/dilation, moderate to severe functional MR and lack of viable myocardium, cardiac surgery would be high risk. Now s/p ostial LAD and proximal  RCA lithotripsy and stenting on 5/24/22.      DATE MAP CVP PAP PCWP Marley CO Marley CI SVR PVR Therapies   6/24/22  10 AM 73 8 36/18 18 5.5 3.1 1163 1.1 - cont IABP 1:1  - cont dobu 2.5  - off nipride  - hold off diuresis                                                                               Plan:  He will remain on 1:1 IABP with dobutamine gtt 2.5 to maintain his hemodynamics + hemodynamic-guided diuresis & afterload reduction. Monitor hemodynamic q6h.      - hemodynamic support:   - Continue iABP 1:1    - IABP MAP not accurate, will use cuff pressure for his blood pressure   - Inotropes:              - continue dobutamine 2.5; previously weaned off 6/22/22, restarted ~ 12 PM 6/23/22  - Afterload reduction:   - continue to hold hydralazine 50 mg, isordil 20 mg    - off nitriprusside given SVR 1160  - Fluid status: Goal CVP 8-10   - hold off diuresis today 6/23, continue with hemodynamic guided diuresis  - Anticoagulation:    - warfarin on hold   - low intensity heparin gtt continued, restarted 6PM 6/23 after subclavian IABP placement  - Antiplatelet: Continue ASA-81mg   - Statin: hold statin   - BMP BID, K>4 & Mg>2  - Strict I/Os: Goal even to positive for low CVP  - Daily weight    ===========   PULMONARY  ===========  # Mild-Moderate Emphysema  # Hx of COVID-19  Former smoker 2 ppd for 40 years quit on 09/09/09.  - Appreciate pulmonary consultation given emphysema   - ILD panel:     - AAT 1 level: in process    - autoimmune work- up:     - LASHANDA positive     - Anti Helen-1 IgG negative     - SSA RO, SSB LA Ab negative     - Scleroderma ab scl 70 negative     - RF negative     - anti CCP 1.7     - ANCA IgG < 1:10     - Aldolase: 19.6(elevated)   - no need for PFTs with DLCO     - if symptoms are considered related to pulm findings: inhaler therapy with LABA/LAMA combination   - will need outpatient pulmonary follow up with seiral PFT hs- CT scan with inspiration and expiration views    =====  Renal  =====  -Strict  I&O while diuresing   -Monitor Electrolytes while actively diuresing K->4 Mg->2    ===================   GASTROENTEROLOGY  ===================  # Severe malnutrition in the context of chronic illness  # Liver injury, in the setting of cardiogenic shock  # GERD  - monitor  - Diuresis as above  - Supplements to help maintain nutrition.     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 Started to downtrend since nose bleed 6/22. Gradually downtrending to 7.1 this AM 6/24. No further signs of bleeding -- slightly on subclavian IABP site.   - iron panel sent  - switched to pediatric tubes for labs, limited blood draw  - goal hemoglobin 6 - 6.5  - monitor for signs of bleeding  - continue pta ferrous sulfate 325mg daily     # profuse epistaxis from the left nare with bloody emesis  ~ 400 ml of bloody emesis in the blue bag on arrival. Also had one severe epistaxis that occurred at [place] requiring cauterization  - ENT consult placed overnight  - Left nasal cavity packed with all absorbable packing - surgifoam + surgicel + surgiflo. No antibiotics are indicated due to absorbable packing.  - saline nasal pray q3h  - if begins to  in quantity of bleeding would recommend spraying the nasal cavity copiously with afrin and holding pressure over the soft part of the nose for 20 minutes continuously. Nasal clips are ineffective and should not be used in place of digital pressure. If bleeding continues despite these measures, repeat. If bleeding continues or is severe please contact ENT.    ==   ID  ==  ROHIT      ===============  RHEUMATOLOGY  ===============  #Lupus  # APL  - Continue pta hydroxychloroquine 200mg bid   - PTA mycophenolic acid 1500mg q12h on hold in anticipation for LVAD  -- Would plan to hold MMF one week prior to surgery and re-starting 5-7 days after surgery in the absence of surgical site infection, poor wound healing or infection elsewhere.     ===    ===  - Continue pta tamsulosin 0.4mg daily     Diet: Low salt diet <2 g/24 hours  DVT Prophylaxis: Heparin GTT  Fitzgerald Catheter: In place  Code Status: Full   Lines: PICC Line     LVAD/Transplant Evaluation Checklist  [x] labs (CBC, CMP, PT/INR, cystatin C, prealbumin, UA + micro)  [x] Infectious (Hep A/B/C, HIV, treponema, HSV 1/2 IgG, CMV IgG, Toxo IgG, EBV IgG, varicella IgG, Quant gold, COVID vaccine/PCR)  [x] Utox/nicotine and cotinine/PeTH   [x] Immunocompatibility (last transfusion, ABO, HLA tissue typing, PRA)  [x] ECG, Echo  [x] CPX - can be oupatient  [x] 6MWT - can be outpatient  [x] RHC, completed but needs repeat in 2-3 months to assess PVR  [x] CVTS consult  [x] Social work consult  [x]  Palliative care consult: final note pending  [x] Neuropsych consult: order placed: final note pending  [x] Nutrition consult  [x] CT Dental   [x] Dental consult  [x] Abd US + doppler  - Abd US complete done, not with doppler; OK given unremarkable CT CAP  [x] Extremity US and ABIs  [x] Carotid US (if DM or ICM or >49yo)  [x] PFTs: outpatient  [x] Dexa: outpatient  [x] CT head non-contrast  [x] CT CAP non-contrast  [x] Colonoscopy (>49 yo)  [x] PSA/HCG    Staffed with Dr. Mendez.    Liborio Layton MD   PGY-2 Internal Medicine  ----------------------------------------------------------------------------------------------------------------    Interval History    Last night there was an issue with MAP monitoring on the IABP which underestimated to true MAP. [place] was repositioned in the AM.     No additional bleeding episodes. Reports still feel  "intermittently nauseous + mild abdominal pain. Had bowel movements yesterday. Eating okay. Denies chest pain, SOB. Feel slightly lightheaded when he sits up. Otherwise no other complaints.     Physical Exam   Temp: 97.5  F (36.4  C) Temp src: Oral BP: 116/67 Pulse: 92   Resp: 20 SpO2: 94 % O2 Device: Oxymask Oxygen Delivery: 1 LPM  Vitals:    06/21/22 0400 06/23/22 0600 06/24/22 0400   Weight: 66.8 kg (147 lb 4.3 oz) 66.3 kg (146 lb 2.6 oz) 67.6 kg (149 lb 0.5 oz)     Vital Signs with Ranges  Temp:  [97.5  F (36.4  C)-98  F (36.7  C)] 97.5  F (36.4  C)  Pulse:  [] 92  Resp:  [8-54] 20  BP: ()/(50-97) 116/67  MAP:  [24 mmHg-110 mmHg] 49 mmHg  Arterial Line BP: ()/(13-86) 63/34  SpO2:  [84 %-100 %] 94 %  I/O last 3 completed shifts:  In: 2313.63 [P.O.:1100; I.V.:963.63; IV Piggyback:250]  Out: 2910 [Urine:2900; Blood:10]     , Blood pressure 116/67, pulse 92, temperature 97.5  F (36.4  C), temperature source Oral, resp. rate 20, height 1.702 m (5' 7\"), weight 67.6 kg (149 lb 0.5 oz), SpO2 94 %.  149 lbs .5 oz  GEN:  Alert, oriented x 3, appears comfortable, NAD.  CV:  2+ bilateral radial pulses present. Good peripheral perfusion.   Chest wall: IABP on right upper chest wall without discharge/visible bleeding. Marked tenderness overlying IABP.   LUNGS:  Clear to auscultation bilaterally   ABD:  Active bowel sounds, soft, mildly distended, no significant tenderness/discomfort.  No rebound/guarding/rigidity.  Right groin area: prior IABP site without hematoma  EXT:  No edema or cyanosis.      Medications    BETA BLOCKER NOT PRESCRIBED      DOBUTamine 2.5 mcg/kg/min (06/24/22 0700)    heparin 1,250 Units/hr (06/24/22 0700)    nitroPRUsside 0.1 mcg/kg/min (06/24/22 0700)      aspirin  81 mg Oral Daily    ceFAZolin  2 g Intravenous See Admin Instructions    ceFAZolin  2 g Intravenous Pre-Op/Pre-procedure x 1 dose    chlorhexidine  10 mL Swish & Spit Once    DULoxetine  30 mg Oral Daily    ferrous sulfate  " 325 mg Oral Daily with lunch    fluticasone-vilanterol  1 puff Inhalation Daily    [Held by provider] hydrALAZINE  50 mg Oral Q8H CAMMY    hydroxychloroquine  200 mg Oral BID    [Held by provider] isosorbide dinitrate  20 mg Oral TID    lidocaine  2 patch Transdermal Q24h    And    lidocaine   Transdermal Q8H [facility]    magnesium sulfate  2 g Intravenous Once    oxidized cellulose   Topical Once    [Held by provider] oxymetazoline  2 spray Both Nostrils BID    polyethylene glycol  17 g Oral TID    [Held by provider] potassium chloride  20 mEq Oral BID    senna-docusate  3 tablet Oral BID    sodium chloride  2 spray Both Nostrils Q3H    tamsulosin  0.4 mg Oral Daily    thiamine  100 mg Oral Daily    traZODone  100 mg Oral At Bedtime       Data   Recent Labs   Lab 06/24/22  0350 06/23/22  2151 06/23/22  1616 06/23/22  1054 06/23/22  1049 06/23/22  0340 06/22/22  0834 06/22/22  0520   WBC 9.8 8.7  --   --  8.7 6.3   < >  --    HGB 7.1* 7.6*  --   --  7.9* 7.9*   < >  --    MCV 96 94  --   --  96 95   < >  --     189  --   --  201 207   < >  --    INR 1.42*  --   --   --   --  1.24*  --  1.20*   * 134* 133*  --  134* 136   < >  --    POTASSIUM 4.0 3.8 4.4  --  4.7 4.4   < >  --    CHLORIDE 99 101 102  --  104 103   < >  --    CO2 22 22 19*  --  22 22   < >  --    BUN 15.6 19.4 20.5  --  21.2 18.6   < >  --    CR 0.62* 0.77 0.87  --  0.86 0.69   < >  --    ANIONGAP 9 11 12  --  8 11   < >  --    ELDA 8.6* 9.1 9.1  --  8.7* 8.8   < >  --    * 154* 117*   < > 112* 180*   < >  --    ALBUMIN 3.0* 3.3* 3.6  --  3.1* 3.1*   < >  --    PROTTOTAL 5.7* 6.0* 6.7  --  5.8* 5.8*   < >  --    BILITOTAL 0.5 0.4 0.5  --  0.4 0.6   < >  --    ALKPHOS 92 104 138*  --  102 102   < >  --    * 333* 402*  --  387* 334*   < >  --    AST 79* 96* 120*  --  112* 116*   < >  --     < > = values in this interval not displayed.       Recent Results (from the past 24 hour(s))   POC US Guidance Needle Placement    Impression     Superficial cervical plexus, supraclavicular block, pectoralis block    Cardiac Device Check - Inpatient   Result Value    Date Time Interrogation Session [id number]    Implantable Pulse Generator  Medtronic    Implantable Pulse Generator Model [id number] Evera XT DR    Implantable Pulse Generator Serial Number [id number]    Type Interrogation Session In Clinic    Clinic Name [facility]    Implantable Pulse Generator Type Defibrillator    Implantable Pulse Generator Implant Date 20180412    Implantable Lead  Medtronic    Implantable Lead Model 5076 CapSureFix Novus    Implantable Lead Serial Number [id number]    Implantable Lead Implant Date 20060306    Implantable Lead Polarity Type Bipolar Lead    Implantable Lead Location Detail 1 UNKNOWN    Implantable Lead Location Right Atrium    Implantable Lead  Medtronic    Implantable Lead Model 6949 Sprint [place]    Implantable Lead Serial Number [id number]    Implantable Lead Implant Date 20060306    Implantable Lead Polarity Type Bipolar Lead    Implantable Lead Location Detail 1 UNKNOWN    Implantable Lead Location Right Ventricle    Marcos Setting Mode (NBG Code) DDD    Marcos Setting Lower Rate Limit 80    Marcos Setting Maximum Tracking Rate 130    Marcos Setting Maximum Sensor Rate 115    Marcos Setting Hysterisis Rate DISABLED    Marcos Setting TRISTEN Delay Low 350    Marcos Setting PAV Delay Low 350    Marcos Setting AT Mode Switch Rate 171    Lead Channel Setting Sensing Polarity Bipolar    Lead Channel Setting Sensing Anode Location Right Atrium    Lead Channel Setting Sensing Anode Terminal Ring    Lead Channel Setting Sensing Cathode Location Right Atrium    Lead Channel Setting Sensing Cathode Terminal Tip    Lead Channel Setting Sensing Sensitivity 0.3    Lead Channel Setting Sensing Polarity Bipolar    Lead Channel Setting Sensing Anode Location Right Ventricle    Lead Channel Setting Sensing Anode  Terminal Ring    Lead Channel Setting Sensing Cathode Location Right Ventricle    Lead Channel Setting Sensing Cathode Terminal Tip    Lead Channel Setting Sensing Sensitivity 0.3    Lead Channel Setting Pacing Polarity Bipolar    Lead Channel Setting Pacing Anode Location Right Atrium    Lead Channel Setting Pacing Anode Terminal Ring    Lead Channel Setting Sensing Cathode Location Right Atrium    Lead Channel Setting Sensing Cathode Terminal Tip    Lead Channel Setting Pacing Pulse Width 0.4    Lead Channel Setting Pacing Amplitude 1.5    Lead Channel Setting Pacing Capture Mode Adaptive    Lead Channel Setting Pacing Polarity Bipolar    Lead Channel Setting Pacing Anode Location Right Ventricle    Lead Channel Setting Pacing Anode Terminal Ring    Lead Channel Setting Sensing Cathode Location Right Ventricle    Lead Channel Setting Sensing Cathode Terminal Tip    Lead Channel Setting Pacing Pulse Width 0.4    Lead Channel Setting Pacing Amplitude 2.25    Lead Channel Setting Pacing Capture Mode Adaptive    Zone Setting Type Category VF    Zone Setting Detection Interval 320    Zone Setting Detection Beats Numerator 30    Zone Setting Detection Beats Denominator 40    Zone Setting Type Category VT    Zone Setting Detection Interval 280    Zone Setting Type Category VT    Zone Setting Detection Interval 350    Zone Setting Type Category VT    Zone Setting Detection Interval 400    Zone Setting Type Category ATRIAL_FIBRILLATION    Zone Setting Type Category AT/AF    Zone Setting Detection Interval 350    Lead Channel Impedance Value 304    Lead Channel Sensing Intrinsic Amplitude 1.4    Lead Channel Pacing Threshold Amplitude 0.5    Lead Channel Pacing Threshold Pulse Width 0.4    Lead Channel Impedance Value 399    Lead Channel Impedance Value 285    Lead Channel Sensing Intrinsic Amplitude 11.1    Lead Channel Pacing Threshold Amplitude 1.00    Lead Channel Pacing Threshold Pulse Width 0.4    Battery Date Time of  Measurements 18817292788254    Battery Status OK    Battery RRT Trigger 2.727    Battery Remaining Longevity 61    Battery Voltage 2.97    Capacitor Charge Type Reformation    Capacitor Last Charge Date Time 20220528111827    Capacitor Charge Time 3.973    Capacitor Charge Energy 18    Marcos Statistic Date Time Start 11965574425982    Marcos Statistic Date Time End 47463851585641    Marcos Statistic RA Percent Paced 0.01    Marcos Statistic RV Percent Paced 0.07    Marcos Statistic AP  Percent 0    Mracos Statistic AS  Percent 0.07    Marcos Statistic AP VS Percent 0.01    Marcos Statistic AS VS Percent 99.92    Atrial Tachy Statistic Date Time Start 02438468943539    Atrial Tachy Statistic Date Time End 82364402048139    Atrial Tachy Statistic AT/AF [place] Percent 0    Therapy Statistic Recent Shocks Delivered 0    Therapy Statistic Recent Shocks Aborted 0    Therapy Statistic Recent ATP Delivered 0    Therapy Statistic Recent Date Time Start 37245972524675    Therapy Statistic Recent Date Time End 40733842491052    Therapy Statistic Total Shocks Delivered 1    Therapy Statistic Total Shocks Aborted 0    Therapy Statistic Total ATP Delivered 0    Therapy Statistic Total  Date Time Start 27102427798989    Therapy Statistic Total  Date Time End 56133296259401    Episode Statistic Recent Count 0    Episode Statistic Type Category AT/AF    Episode Statistic Recent Count 0    Episode Statistic Type Category SVT    Episode Statistic Recent Count 0    Episode Statistic Type Category VT    Episode Statistic Recent Count 0    Episode Statistic Type Category VF    Episode Statistic Recent Count 0    Episode Statistic Type Category VT    Episode Statistic Recent Count 0    Episode Statistic Type Category VT    Episode Statistic Recent Count 0    Episode Statistic Type Category VT    Episode Statistic Recent Date Time Start 66084110855118    Episode Statistic Recent Date Time End 76129415924079    Episode Statistic Recent Date  Time Start 27811083071899    Episode Statistic Recent Date Time End 04123728375535    Episode Statistic Recent Date Time Start 33746755114875    Episode Statistic Recent Date Time End 46436127512028    Episode Statistic Recent Date Time Start 66696083295097    Episode Statistic Recent Date Time End 66243048187504    Episode Statistic Recent Date Time Start 81715164080001    Episode Statistic Recent Date Time End 86035164763701    Episode Statistic Recent Date Time Start 23123505517186    Episode Statistic Recent Date Time End 33082515067333    Episode Statistic Recent Date Time Start 20696560562348    Episode Statistic Recent Date Time End 98026221769820    Episode Statistic Total Count 2    Episode Statistic Type Category AT/AF    Episode Statistic Total Count 0    Episode Statistic Type Category SVT    Episode Statistic Total Count 9    Episode Statistic Type Category VT    Episode Statistic Total Count 1    Episode Statistic Type Category VF    Episode Statistic Total Count 0    Episode Statistic Type Category VT    Episode Statistic Total Count 0    Episode Statistic Type Category VT    Episode Statistic Total Count 1    Episode Statistic Type Category VT    Episode Statistic Total Date Time Start 81960114052432    Episode Statistic Total Date Time End 55721050727285    Episode Statistic Total Date Time Start 71166824626549    Episode Statistic Total Date Time End 65866148791928    Episode Statistic Total Date Time Start 61642249900910    Episode Statistic Total Date Time End 64493954980241    Episode Statistic Total Date Time Start 02411350869345    Episode Statistic Total Date Time End 87684427209448    Episode Statistic Total Date Time Start 65290301480775    Episode Statistic Total Date Time End 22974872982236    Episode Statistic Total Date Time Start 79163002486652    Episode Statistic Total Date Time End 73161830095971    Episode Statistic Total Date Time Start 97647178331402    Episode Statistic Total  Date Time End 22322891558853    Narrative    Patient seen in OR 24 [facility] for evaluation and iterative programming of their ICD per MD orders pre-op balloon pump insertion per Anesthesia request.     Device: Fluidigm [id number] Eliseo TIRADO DR  Normal device function.  Mode: AAI<=>DDD  bpm  AP: <0.1%  : <0.1%  Intrinsic rhythm: SR @ 97 bpm  Thoracic Impedance: Below reference line, suggesting possible intrathoracic fluid accumulation.  Short V-V intervals: 0  Lead Trends Appear Stable: Yes  Estimated battery longevity to RRT = 5.1 years. Battery voltage = 2.97 V.   Atrial Arrhythmia: None  AF [place]: 0%  Anticoagulant: Heparin gtt  Ventricular Arrhythmia: None  Setting Changes: Mode changed to DDD  bpm per Anesthesia. Tachy therapies programmed OFF.    Plan: Page Device RN post-op for reprogramming.  RICKIE Mcpherson RN    Dual lead ICD    I have reviewed and interpreted the device interrogation, settings, programming and nurse's summary. The device is functioning within normal device parameters. I agree with the current findings, assessment and plan.   XR Chest Port 1 View    Narrative    Exam: XR CHEST PORT 1 VIEW, 6/23/2022 10:15 AM    Comparison: Chest x-ray same day, 6/22/2022    History: INSERTION, INTRA-AORTIC BALLOON PUMP RIGHT SUBCLAVIAN ARTERY.    Findings:   Portable AP view of the chest. Right IJ Pritchett-Janae catheter with tip  projecting in the left pulmonary artery. Left chest wall cardiac  device with leads in stable position. Intra-aortic balloon pump marker  projects at the level of the [place]. Right subclavian graft. Trachea  is midline. Cardiomediastinal silhouette is stable. No change in the  mild interstitial prominence. There is no appreciable pneumothorax or  pleural effusion. The upper abdomen is unremarkable. No acute osseous  abnormality.       Impression    Impression:   1. Intra-aortic balloon pump marker projects at the level of the  paola. New right subclavian graft.  2. No significant  change in the mild interstitial edema.     I have personally reviewed the examination and initial interpretation  and I agree with the findings.    GABRIEL COURTNEY MD         SYSTEM ID:  [id number]   XR Surgery TODD Fluoro L/T 5 Min w Stills    Narrative    This exam was marked as non-reportable because it will not be read by a   radiologist or a [place] non-radiologist provider.         Cardiac Device Check - Inpatient   Result Value    Date Time Interrogation Session [id number]    Implantable Pulse Generator  Medtronic    Implantable Pulse Generator Model [id number] Evera XT DR    Implantable Pulse Generator Serial Number [id number]    Type Interrogation Session In Clinic    Clinic Name [facility]    Implantable Pulse Generator Type Defibrillator    Implantable Pulse Generator Implant Date 20180412    Implantable Lead  Medtronic    Implantable Lead Model 5076 CapSureFix Novus    Implantable Lead Serial Number [id number]    Implantable Lead Implant Date 20060306    Implantable Lead Polarity Type Bipolar Lead    Implantable Lead Location Detail 1 UNKNOWN    Implantable Lead Location Right Atrium    Implantable Lead  Medtronic    Implantable Lead Model 6949 Sprint [place]    Implantable Lead Serial Number [id number]    Implantable Lead Implant Date 20060306    Implantable Lead Polarity Type Bipolar Lead    Implantable Lead Location Detail 1 UNKNOWN    Implantable Lead Location Right Ventricle    Marcos Setting Mode (NBG Code) MVP_AAI_DDD    Marcos Setting Lower Rate Limit 50    Marcos Setting Maximum Tracking Rate 130    Marcos Setting Maximum Sensor Rate 115    Marcos Setting Hysterisis Rate DISABLED    Marcos Setting TRISTEN Delay Low 350    Marcos Setting PAV Delay Low 350    Marcos Setting AT Mode Switch Rate 171    Lead Channel Setting Sensing Polarity Bipolar    Lead Channel Setting Sensing Anode Location Right Atrium    Lead Channel Setting Sensing Anode Terminal  Ring    Lead Channel Setting Sensing Cathode Location Right Atrium    Lead Channel Setting Sensing Cathode Terminal Tip    Lead Channel Setting Sensing Sensitivity 0.3    Lead Channel Setting Sensing Polarity Bipolar    Lead Channel Setting Sensing Anode Location Right Ventricle    Lead Channel Setting Sensing Anode Terminal Ring    Lead Channel Setting Sensing Cathode Location Right Ventricle    Lead Channel Setting Sensing Cathode Terminal Tip    Lead Channel Setting Sensing Sensitivity 0.3    Lead Channel Setting Pacing Polarity Bipolar    Lead Channel Setting Pacing Anode Location Right Atrium    Lead Channel Setting Pacing Anode Terminal Ring    Lead Channel Setting Sensing Cathode Location Right Atrium    Lead Channel Setting Sensing Cathode Terminal Tip    Lead Channel Setting Pacing Pulse Width 0.4    Lead Channel Setting Pacing Amplitude 1.5    Lead Channel Setting Pacing Capture Mode Adaptive    Lead Channel Setting Pacing Polarity Bipolar    Lead Channel Setting Pacing Anode Location Right Ventricle    Lead Channel Setting Pacing Anode Terminal Ring    Lead Channel Setting Sensing Cathode Location Right Ventricle    Lead Channel Setting Sensing Cathode Terminal Tip    Lead Channel Setting Pacing Pulse Width 0.4    Lead Channel Setting Pacing Amplitude 2.25    Lead Channel Setting Pacing Capture Mode Adaptive    Zone Setting Type Category VF    Zone Setting Detection Interval 320    Zone Setting Detection Beats Numerator 30    Zone Setting Detection Beats Denominator 40    Zone Setting Type Category VT    Zone Setting Detection Interval 280    Zone Setting Type Category VT    Zone Setting Detection Interval 350    Zone Setting Type Category VT    Zone Setting Detection Interval 400    Zone Setting Type Category ATRIAL_FIBRILLATION    Zone Setting Type Category AT/AF    Zone Setting Detection Interval 350    Lead Channel Impedance Value 304    Lead Channel Sensing Intrinsic Amplitude 1.375    Lead Channel  Pacing Threshold Amplitude 0.5    Lead Channel Pacing Threshold Pulse Width 0.4    Lead Channel Impedance Value 399    Lead Channel Impedance Value 304    Lead Channel Sensing Intrinsic Amplitude 11.1    Lead Channel Pacing Threshold Amplitude 1    Lead Channel Pacing Threshold Pulse Width 0.4    Battery Date Time of Measurements 20220623111415    Battery Status Middle of Service    Battery RRT Trigger 2.727    Battery Remaining Longevity 61    Battery Voltage 2.96    Capacitor Charge Type Reformation    Capacitor Last Charge Date Time 20220528111827    Capacitor Charge Time 3.973    Capacitor Charge Energy 18    Marcos Statistic Date Time Start 20220622143626    Marcos Statistic Date Time End 20220623111430    Marcos Statistic RA Percent Paced 0.15    Marcos Statistic RV Percent Paced 0.06    Marcos Statistic AP  Percent 0    Marcos Statistic AS  Percent 0.06    Marcos Statistic AP VS Percent 0.15    Marcos Statistic AS VS Percent 99.79    Atrial Tachy Statistic Date Time Start 20220622143626    Atrial Tachy Statistic Date Time End 20220623111430    Atrial Tachy Statistic AT/AF [place] Percent 0    Therapy Statistic Recent Shocks Delivered 0    Therapy Statistic Recent Shocks Aborted 0    Therapy Statistic Recent ATP Delivered 0    Therapy Statistic Recent Date Time Start 20220622143626    Therapy Statistic Recent Date Time End 20220623111430    Therapy Statistic Total Shocks Delivered 1    Therapy Statistic Total Shocks Aborted 0    Therapy Statistic Total ATP Delivered 0    Therapy Statistic Total  Date Time Start 20180412151624    Therapy Statistic Total  Date Time End 20220623111430    Episode Statistic Recent Count 0    Episode Statistic Type Category AT/AF    Episode Statistic Recent Count 0    Episode Statistic Type Category SVT    Episode Statistic Recent Count 0    Episode Statistic Type Category VT    Episode Statistic Recent Count 0    Episode Statistic Type Category VF    Episode Statistic Recent Count 0     Episode Statistic Type Category VT    Episode Statistic Recent Count 0    Episode Statistic Type Category VT    Episode Statistic Recent Count 0    Episode Statistic Type Category VT    Episode Statistic Recent Date Time Start 50730041062762    Episode Statistic Recent Date Time End 19669189948209    Episode Statistic Recent Date Time Start 60737249995116    Episode Statistic Recent Date Time End 43885098495794    Episode Statistic Recent Date Time Start 60671504847267    Episode Statistic Recent Date Time End 40114964041027    Episode Statistic Recent Date Time Start 48950507696289    Episode Statistic Recent Date Time End 71071089867183    Episode Statistic Recent Date Time Start 61552894606963    Episode Statistic Recent Date Time End 79050932391278    Episode Statistic Recent Date Time Start 38511449975106    Episode Statistic Recent Date Time End 54528484726757    Episode Statistic Recent Date Time Start 65594688656199    Episode Statistic Recent Date Time End 69391310865958    Episode Statistic Total Count 2    Episode Statistic Type Category AT/AF    Episode Statistic Total Count 0    Episode Statistic Type Category SVT    Episode Statistic Total Count 9    Episode Statistic Type Category VT    Episode Statistic Total Count 1    Episode Statistic Type Category VF    Episode Statistic Total Count 0    Episode Statistic Type Category VT    Episode Statistic Total Count 0    Episode Statistic Type Category VT    Episode Statistic Total Count 1    Episode Statistic Type Category VT    Episode Statistic Total Date Time Start 17729555929580    Episode Statistic Total Date Time End 55118367404329    Episode Statistic Total Date Time Start 11100769910290    Episode Statistic Total Date Time End 01525530926749    Episode Statistic Total Date Time Start 11230115106641    Episode Statistic Total Date Time End 07368505983286    Episode Statistic Total Date Time Start 72743161303910    Episode Statistic Total Date Time  End 20220623111430    Episode Statistic Total Date Time Start 43682482845016    Episode Statistic Total Date Time End 20220623111430    Episode Statistic Total Date Time Start 20180412151624    Episode Statistic Total Date Time End 20220623111430    Episode Statistic Total Date Time Start 20180412151624    Episode Statistic Total Date Time End 20220623111430    Narrative    Patient seen on PCU [address] [place] [address] for evaluation and iterative programming of their ICD per MD orders, post op IABP.     Device: Medtronic [id number] Evera XT   Normal device function  Mode: AAI<>DDD /115 bpm  AP: 0.1%  : <0.1%  Intrinsic rhythm: AS-VS 98 bpm  Thoracic Impedance: Well below the reference line suggesting moderate intrathoracic fluid accumulation.  Short V-V intervals: 0  Lead Trends Appear Stable.  Estimated battery longevity to RRT = 3.8-6.3 years.  Battery voltage = 2.96 V.   Atrial Arrhythmia: None  AF [place]: 0%  Anticoagulant: None  Ventricular Arrhythmia: None  Setting Changes: VF, FVT and VT monitor zones were all turned back ON.  Pacing mode and LRL were changed back to AAI<>DDD 50bpm from DDD 80 bpm.     Plan: Resume routine follow up with home device clinic.  Brenda Degroot RN    Dual lead ICD    I have reviewed and interpreted the device interrogation, settings, programming and nurse's summary. The device is functioning within normal device parameters. I agree with the current findings, assessment and plan.   XR Chest Port 1 View    Narrative    Exam: XR CHEST PORT 1 VIEW, 6/23/2022 12:44 PM    Indication: PA placement    Comparison: 6/23/2022 chest x-ray at 11:18    Findings:     Portable, supine view the chest. Right IJ [place]-Janae has been advanced  with the tip now projecting over the distal right interlobar or right  lower lobe pulmonary artery. Remainder of exam and position of support  devices are stable in position.       Impression    Impression:     Right IJ [place]-Janae tip projects over distal interlobar or right  lower  lobe pulmonary artery, consider slight retraction. Remainder of  examination and support devices are stable.    I have personally reviewed the examination and initial interpretation  and I agree with the findings.    GABRIEL COURTNEY MD         SYSTEM ID:  [id number]   XR Chest Port 1 View    Narrative    Exam: XR CHEST PORT 1 VIEW, 6/23/2022 12:45 PM    Indication: post subclavian balloon pump placement    Comparison: 6/23/2022 chest x-ray at 10:06    Findings:   Portable, supine view the chest. Right IJ [place]-Janae catheter, left  chest implantable cardiac defibrillator/leads and IABP are stable in  position. Midline trachea. Stable cardiac silhouette. Low lung  volumes. No focal consolidation, significant pleural effusion or  pneumothorax. Right PICC tip is stable in position.      Impression    Impression: Stable exam and position of support devices.    I have personally reviewed the examination and initial interpretation  and I agree with the findings.    CONSTANTINO OWEN MD         SYSTEM ID:  [id number]   XR Chest Port 1 View    Narrative    EXAM: XR Chest 1 view 6/23/2022 8:50 PM      HISTORY: IABP placement.    COMPARISON: Same date radiograph of the chest at 1124.     TECHNIQUE: Frontal view of the chest.    FINDINGS: Right IJ [place]-Janae catheter with tip injecting over the  main/central left left pulmonary artery. Left chest wall ICD in stable  position. Intra-aortic balloon pump with tip at level of T5-6.  Trachea is midline. Cardiac and mediastinal silhouette is stable.  Decreased interstitial pulmonary opacities. No pleural effusions. No  pneumothoraces. Unchanged right PICC catheter      Impression    IMPRESSION:   1. Intra-aortic balloon pump with tip at the level of T5-6 about 2.4  cm below the superior margin of the ascending thoracic aorta.  2. Interval retraction of the [place]-Janae catheter with tip projecting  over the main/central left pulmonary artery artery   3. Decreased interstitial  pulmonary opacities.    I have personally reviewed the examination and initial interpretation  and I agree with the findings.    JILL CARRANZA MD         SYSTEM ID:  [id number]   XR Chest Port 1 View    Impression    RESIDENT PRELIMINARY INTERPRETATION  IMPRESSION:   1. Interval advancement of the right IJ [place]-Janae catheter with tip in  the distal left pulmonary artery.  2. Stable interstitial pulmonary opacities.    Finding was identified on 6/23/2022 10:44 PM.      was contacted by Dr. Martinez at 6/23/2022 10:47 PM and  verbalized understanding of the urgent finding.    XR Chest Port 1 View    Impression    RESIDENT PRELIMINARY INTERPRETATION  Impression:   1. [place]-Janae catheter tip terminates in the left main pulmonary  artery.  2. Unchanged interstitial pulmonary opacities.   XR Chest Port 1 View    Impression    RESIDENT PRELIMINARY INTERPRETATION  Impression:   1. [place]-Janae catheter tip terminates over the main pulmonary artery.  2. Unchanged interstitial pulmonary opacities.

## 2022-06-24 NOTE — PROGRESS NOTES
Shashank IABP representative evaluated pump and xray. Per report IABP and swan is in the appropriate places.  Pt up in the recliner most of this shift.  Bumex 8 mg X1 .  Bladder scanned pt and had 736 ml.  Orders to straight cath pt and got 1 liter out.  For vital signs and complete assessments see documentation flow sheet.   Will continue to monitor.

## 2022-06-24 NOTE — PROGRESS NOTES
"      SPIRITUAL HEALTH SERVICES Progress Note  [place]  4E    Saw pt [facility] per follow up request from on call .      Illness Narrative - Pt reported he is in the hospital \"for a bad ticker,\" and that he has been in and out of the hospital a lot in the last three months.  Pt described this illness as coming on fast, and that he was not prepared.        Distress - Pt said he worked as a  but was \"forced to retire\" and now isn't sure what he is going to do.   explored with pt how his life might change given his heart issues and the limitations he might face as he moves forward, and how he feels about having to adapt.      Coping - Pt said he has two children and a cat, and enjoys playing pool and bowling as recreational activities.  Pt said he has a positive outlook and knows that this health challenge \"can't be helped,\" and that he has to \"go with the flow.\"       Meaning-Making - Pt is Mosque and said he goes to Judaism about once a month, but \"believes in God 100%.\"  Pt usually says the Lords prayer before going to sleep at night, and has a strong connection to God and his art.   provided prayer, per patient's request, for a strong heart and continued health and healing.      Plan - [facility] will continue to follow up and provide support as needed/requested.     Yoli Rucker  Staff   [phone]    "

## 2022-06-25 ENCOUNTER — APPOINTMENT (OUTPATIENT)
Dept: GENERAL RADIOLOGY | Facility: CLINIC | Age: 62
DRG: 001 | End: 2022-06-25
Attending: INTERNAL MEDICINE
Payer: COMMERCIAL

## 2022-06-25 ENCOUNTER — APPOINTMENT (OUTPATIENT)
Dept: OCCUPATIONAL THERAPY | Facility: CLINIC | Age: 62
DRG: 001 | End: 2022-06-25
Attending: INTERNAL MEDICINE
Payer: COMMERCIAL

## 2022-06-25 LAB
A FLAVUS AB SER QL ID: NORMAL
A FUMIGATUS2 AB SER QL ID: NORMAL
A FUMIGATUS3 AB SER QL ID: NORMAL
ABO/RH(D): NORMAL
ALBUMIN SERPL BCG-MCNC: 3.5 G/DL (ref 3.5–5.2)
ALP SERPL-CCNC: 94 U/L (ref 40–129)
ALT SERPL W P-5'-P-CCNC: 216 U/L (ref 10–50)
ANION GAP SERPL CALCULATED.3IONS-SCNC: 12 MMOL/L (ref 7–15)
ANTIBODY SCREEN: NEGATIVE
AST SERPL W P-5'-P-CCNC: 56 U/L (ref 10–50)
BASE EXCESS BLDV CALC-SCNC: -0.1 MMOL/L (ref -7.7–1.9)
BASE EXCESS BLDV CALC-SCNC: 0.4 MMOL/L (ref -7.7–1.9)
BASE EXCESS BLDV CALC-SCNC: 2.4 MMOL/L (ref -7.7–1.9)
BILIRUB SERPL-MCNC: 0.6 MG/DL
BUN SERPL-MCNC: 11.6 MG/DL (ref 8–23)
BURR CELLS BLD QL SMEAR: SLIGHT
CALCIUM SERPL-MCNC: 8.7 MG/DL (ref 8.8–10.2)
CHLORIDE SERPL-SCNC: 99 MMOL/L (ref 98–107)
CREAT SERPL-MCNC: 0.51 MG/DL (ref 0.67–1.17)
DEPRECATED HCO3 PLAS-SCNC: 21 MMOL/L (ref 22–29)
ELLIPTOCYTES BLD QL SMEAR: SLIGHT
ERYTHROCYTE [DISTWIDTH] IN BLOOD BY AUTOMATED COUNT: 17.7 % (ref 10–15)
GFR SERPL CREATININE-BSD FRML MDRD: >90 ML/MIN/1.73M2
GLUCOSE BLDC GLUCOMTR-MCNC: 97 MG/DL (ref 70–99)
GLUCOSE SERPL-MCNC: 133 MG/DL (ref 70–99)
HCO3 BLDV-SCNC: 24 MMOL/L (ref 21–28)
HCO3 BLDV-SCNC: 25 MMOL/L (ref 21–28)
HCO3 BLDV-SCNC: 26 MMOL/L (ref 21–28)
HCT VFR BLD AUTO: 24.6 % (ref 40–53)
HGB BLD-MCNC: 7.5 G/DL (ref 13.3–17.7)
INR PPP: 1.36 (ref 0.85–1.15)
LACTATE SERPL-SCNC: 0.7 MMOL/L (ref 0.7–2)
MAGNESIUM SERPL-MCNC: 3.4 MG/DL (ref 1.7–2.3)
MCH RBC QN AUTO: 28.6 PG (ref 26.5–33)
MCHC RBC AUTO-ENTMCNC: 30.5 G/DL (ref 31.5–36.5)
MCV RBC AUTO: 94 FL (ref 78–100)
O2/TOTAL GAS SETTING VFR VENT: 21 %
OXYHGB MFR BLDV: 46 % (ref 70–75)
OXYHGB MFR BLDV: 48 % (ref 70–75)
OXYHGB MFR BLDV: 51 % (ref 70–75)
PCO2 BLDV: 37 MM HG (ref 40–50)
PCO2 BLDV: 38 MM HG (ref 40–50)
PCO2 BLDV: 38 MM HG (ref 40–50)
PH BLDV: 7.42 [PH] (ref 7.32–7.43)
PH BLDV: 7.42 [PH] (ref 7.32–7.43)
PH BLDV: 7.46 [PH] (ref 7.32–7.43)
PHOSPHATE SERPL-MCNC: 3.9 MG/DL (ref 2.5–4.5)
PLAT MORPH BLD: ABNORMAL
PLATELET # BLD AUTO: 137 10E3/UL (ref 150–450)
PO2 BLDV: 28 MM HG (ref 25–47)
PO2 BLDV: 28 MM HG (ref 25–47)
PO2 BLDV: 30 MM HG (ref 25–47)
POTASSIUM SERPL-SCNC: 4 MMOL/L (ref 3.4–5.3)
PROT SERPL-MCNC: 5.8 G/DL (ref 6.4–8.3)
RBC # BLD AUTO: 2.62 10E6/UL (ref 4.4–5.9)
RBC MORPH BLD: ABNORMAL
S VIRIDIS AB SER QL ID: NORMAL
SODIUM SERPL-SCNC: 132 MMOL/L (ref 136–145)
SPECIMEN EXPIRATION DATE: NORMAL
STFR SERPL-MCNC: 5.5 MG/L
T CANDIDUS AB SER QL: NORMAL
UFH PPP CHRO-ACNC: 0.14 IU/ML
UFH PPP CHRO-ACNC: 0.38 IU/ML
UFH PPP CHRO-ACNC: 0.4 IU/ML
WBC # BLD AUTO: 8.4 10E3/UL (ref 4–11)

## 2022-06-25 PROCEDURE — 82805 BLOOD GASES W/O2 SATURATION: CPT

## 2022-06-25 PROCEDURE — 74018 RADEX ABDOMEN 1 VIEW: CPT

## 2022-06-25 PROCEDURE — 82805 BLOOD GASES W/O2 SATURATION: CPT | Performed by: INTERNAL MEDICINE

## 2022-06-25 PROCEDURE — 83735 ASSAY OF MAGNESIUM: CPT | Performed by: INTERNAL MEDICINE

## 2022-06-25 PROCEDURE — 999N000045 HC STATISTIC DAILY SWAN MONITORING

## 2022-06-25 PROCEDURE — 85520 HEPARIN ASSAY: CPT | Performed by: INTERNAL MEDICINE

## 2022-06-25 PROCEDURE — 74018 RADEX ABDOMEN 1 VIEW: CPT | Mod: 26 | Performed by: RADIOLOGY

## 2022-06-25 PROCEDURE — 200N000002 HC R&B ICU UMMC

## 2022-06-25 PROCEDURE — 250N000013 HC RX MED GY IP 250 OP 250 PS 637: Performed by: STUDENT IN AN ORGANIZED HEALTH CARE EDUCATION/TRAINING PROGRAM

## 2022-06-25 PROCEDURE — 86850 RBC ANTIBODY SCREEN: CPT | Performed by: INTERNAL MEDICINE

## 2022-06-25 PROCEDURE — 82805 BLOOD GASES W/O2 SATURATION: CPT | Performed by: STUDENT IN AN ORGANIZED HEALTH CARE EDUCATION/TRAINING PROGRAM

## 2022-06-25 PROCEDURE — 86901 BLOOD TYPING SEROLOGIC RH(D): CPT | Performed by: INTERNAL MEDICINE

## 2022-06-25 PROCEDURE — 97530 THERAPEUTIC ACTIVITIES: CPT | Mod: GO | Performed by: OCCUPATIONAL THERAPIST

## 2022-06-25 PROCEDURE — 250N000011 HC RX IP 250 OP 636

## 2022-06-25 PROCEDURE — 83605 ASSAY OF LACTIC ACID: CPT | Performed by: STUDENT IN AN ORGANIZED HEALTH CARE EDUCATION/TRAINING PROGRAM

## 2022-06-25 PROCEDURE — 71045 X-RAY EXAM CHEST 1 VIEW: CPT | Mod: 26 | Performed by: RADIOLOGY

## 2022-06-25 PROCEDURE — 99291 CRITICAL CARE FIRST HOUR: CPT | Mod: GC | Performed by: INTERNAL MEDICINE

## 2022-06-25 PROCEDURE — 85027 COMPLETE CBC AUTOMATED: CPT | Performed by: STUDENT IN AN ORGANIZED HEALTH CARE EDUCATION/TRAINING PROGRAM

## 2022-06-25 PROCEDURE — 999N000155 HC STATISTIC RAPCV CVP MONITORING

## 2022-06-25 PROCEDURE — 80053 COMPREHEN METABOLIC PANEL: CPT | Performed by: STUDENT IN AN ORGANIZED HEALTH CARE EDUCATION/TRAINING PROGRAM

## 2022-06-25 PROCEDURE — 71045 X-RAY EXAM CHEST 1 VIEW: CPT

## 2022-06-25 PROCEDURE — 71045 X-RAY EXAM CHEST 1 VIEW: CPT | Mod: 77

## 2022-06-25 PROCEDURE — 250N000013 HC RX MED GY IP 250 OP 250 PS 637

## 2022-06-25 PROCEDURE — 84100 ASSAY OF PHOSPHORUS: CPT | Performed by: INTERNAL MEDICINE

## 2022-06-25 PROCEDURE — 250N000013 HC RX MED GY IP 250 OP 250 PS 637: Performed by: INTERNAL MEDICINE

## 2022-06-25 PROCEDURE — 85610 PROTHROMBIN TIME: CPT | Performed by: INTERNAL MEDICINE

## 2022-06-25 PROCEDURE — 999N000075 HC STATISTIC IABP MONITORING

## 2022-06-25 PROCEDURE — 250N000011 HC RX IP 250 OP 636: Performed by: STUDENT IN AN ORGANIZED HEALTH CARE EDUCATION/TRAINING PROGRAM

## 2022-06-25 RX ADMIN — SALINE NASAL SPRAY 2 SPRAY: 1.5 SOLUTION NASAL at 15:16

## 2022-06-25 RX ADMIN — FLUTICASONE FUROATE AND VILANTEROL TRIFENATATE 1 PUFF: 100; 25 POWDER RESPIRATORY (INHALATION) at 08:25

## 2022-06-25 RX ADMIN — OXYCODONE HYDROCHLORIDE 5 MG: 5 TABLET ORAL at 21:07

## 2022-06-25 RX ADMIN — HEPARIN SODIUM 1050 UNITS/HR: 10000 INJECTION, SOLUTION INTRAVENOUS at 00:09

## 2022-06-25 RX ADMIN — Medication 325 MG: at 12:35

## 2022-06-25 RX ADMIN — HYDROXYCHLOROQUINE SULFATE 200 MG: 200 TABLET, FILM COATED ORAL at 08:22

## 2022-06-25 RX ADMIN — Medication 10 MG: at 21:07

## 2022-06-25 RX ADMIN — HYDROXYCHLOROQUINE SULFATE 200 MG: 200 TABLET, FILM COATED ORAL at 19:52

## 2022-06-25 RX ADMIN — SALINE NASAL SPRAY 2 SPRAY: 1.5 SOLUTION NASAL at 23:23

## 2022-06-25 RX ADMIN — ASPIRIN 81 MG CHEWABLE TABLET 81 MG: 81 TABLET CHEWABLE at 08:22

## 2022-06-25 RX ADMIN — SALINE NASAL SPRAY 2 SPRAY: 1.5 SOLUTION NASAL at 20:04

## 2022-06-25 RX ADMIN — SENNOSIDES AND DOCUSATE SODIUM 1 TABLET: 8.6; 5 TABLET ORAL at 19:52

## 2022-06-25 RX ADMIN — LIDOCAINE PATCH 4% 2 PATCH: 40 PATCH TOPICAL at 20:03

## 2022-06-25 RX ADMIN — SENNOSIDES AND DOCUSATE SODIUM 1 TABLET: 8.6; 5 TABLET ORAL at 08:22

## 2022-06-25 RX ADMIN — DULOXETINE 30 MG: 30 CAPSULE, DELAYED RELEASE ORAL at 08:21

## 2022-06-25 RX ADMIN — THIAMINE HCL TAB 100 MG 100 MG: 100 TAB at 08:22

## 2022-06-25 RX ADMIN — SALINE NASAL SPRAY 2 SPRAY: 1.5 SOLUTION NASAL at 05:14

## 2022-06-25 RX ADMIN — ACETAMINOPHEN 325 MG: 325 TABLET, FILM COATED ORAL at 18:51

## 2022-06-25 RX ADMIN — SALINE NASAL SPRAY 2 SPRAY: 1.5 SOLUTION NASAL at 08:24

## 2022-06-25 RX ADMIN — SALINE NASAL SPRAY 2 SPRAY: 1.5 SOLUTION NASAL at 18:21

## 2022-06-25 RX ADMIN — TAMSULOSIN HYDROCHLORIDE 0.4 MG: 0.4 CAPSULE ORAL at 08:22

## 2022-06-25 RX ADMIN — HEPARIN SODIUM 1200 UNITS/HR: 10000 INJECTION, SOLUTION INTRAVENOUS at 18:23

## 2022-06-25 RX ADMIN — DOBUTAMINE HYDROCHLORIDE 2.5 MCG/KG/MIN: 200 INJECTION INTRAVENOUS at 19:44

## 2022-06-25 RX ADMIN — TRAZODONE HYDROCHLORIDE 100 MG: 50 TABLET ORAL at 21:07

## 2022-06-25 ASSESSMENT — ACTIVITIES OF DAILY LIVING (ADL)
ADLS_ACUITY_SCORE: 32
ADLS_ACUITY_SCORE: 29
ADLS_ACUITY_SCORE: 32

## 2022-06-25 NOTE — PROGRESS NOTES
Pt out bed to recliner most of this shift.  Ambulated in halls with OT walking 80 yards and again with RN walking 100 yards.  Pt ate 100 % of both meals this shift.  Family (daughter) at the bedside and updated on POC. Plan to minimize blood draws and using pediatric blood tubes. Hgb 7.5.  1.7 L urine out since midnight, no diuretics this shift.  For vital signs and complete assessments, see documentation flow sheet.  Will continue to monitor.

## 2022-06-25 NOTE — PLAN OF CARE
Goal Outcome Evaluation:  Plan of Care Reviewed With: patient   Overall Patient Progress: no change  Outcome Evaluation: After a hour or so of pt being brought to bed IABP Daina waveform became suboptimal. Ecmo specialist, RN circulator and writer tried to troubleshoot deviece.Repositioning pt seems to momentarily improve waveforms and pump's augmentation.  Neuro: A&Ox4. After pt was lying in bed pt's blurred vision improved. Acetaminophen and PO oxy given for incisional pain.   Resp: On Ra. Clear LS.   CV: NS w/ 1 st AVB. AAI<=>DDD /115. HR 80s to low 100s. Map goal 70-80. NIBP maps all above 65. 1:1 100% augmentation subclavian IABP. Dobutamine 2.5 mcg/kg/min. Writer noticed a trend when pt wakes up IABP daina waveform and augmentation becomes more optimal.   : Pt voids spontaneously. Average UO based on 5 voids was 69 mL/hr  GI: No BM overnight.  Skin/IV: [place] @ 47.  Plan: Status 2 for heart transplant.     Time 2200 0400   CVP 8 8   PA 44/18 46/18   SVo2 47 46   CI 2.5 2.5   SVR 1185.3 1169.8     For vital signs and complete assessments, please see documentation flowsheets.

## 2022-06-25 NOTE — PROGRESS NOTES
Cardiology Progress Note 6/23/22    Assessment & Plan   61-year-old man with history of ischemic cardiomyopathy LVEF 15%) NYHA class IV symptoms ACC stage D, multiple admissions with heart failure over the past several months. Presented with cardiogenic shock c/b multi-organ failure (JOSE; Acute liver injury/shock liver) requiring mechanical hemodynamic support. Now listed as status 2 for OHT. Course c/b large epistaxis 6/22 with 400 ml blood. Had subclavian IABP placed instead of femoral IABP 6/23/22. Hemoglobin gradually downtrending without e/o further e/o bleeding. Continue to optimize his hemodynamics while awaiting for heart transplant.     Changes Today:  - remain stable with dobutamine 2.5, IABP 1:1  - s/p subclavian IABP 6/23/22, swan repositioned today. [place] & IABP in position upon repeat Xray.   - IABP MAP not accurate, currently base his blood pressure on his cuff pressure  - promote ambulation  - continue hemodynamic-guided diuresis -- no diuretic given today  - start caloric count    ===============  CARDIOVASCULAR  ===============  # Ambulatory cardiogenic shock SCAI D  # Advanced ischemic cardiomyopathy, Class IV, Stage D  # s/p CRT-D ([employer] 2006, gen change 2012)  # SVT  # Nonsustained VT  # CAD s/p PCI to LAD (2005)  # Hx of MI (2007)  # Severe ostial LAD disease with in-stent restenosis of mid LAD at diag bifurcation, severe proximal RCA disease, mild circ disease now s/p ostial LAD and proximal RCA lithotripsy and stenting on 5/24/22  CMRI showing infarcted myocardium in LAD territory. Given the degree of LV dysfunction/dilation, moderate to severe functional MR and lack of viable myocardium, cardiac surgery would be high risk. Now s/p ostial LAD and proximal RCA lithotripsy and stenting on 5/24/22.      DATE MAP CVP PAP PCWP Marley CO Marley CI SVR PVR Therapies   6/24/22  10 AM 73 8 36/18 18 5.5 3.1 1163 1.1 - cont IABP 1:1  - cont dobu 2.5  - off nipride  - s/p bumex 2 mg IV*1     6/25/22 72 8  39/21 11 4.9 2.8 1050 3.2 - continue IABP  - continue dobu  - hold off diuresis                                                                  Plan:  He will remain on 1:1 IABP with dobutamine gtt 2.5 to maintain his hemodynamics + hemodynamic-guided diuresis & afterload reduction. Monitor hemodynamic q6h.      - hemodynamic support:   - Continue iABP 1:1    - IABP MAP not accurate, will use cuff pressure for his blood pressure   - Inotropes:              - continue dobutamine 2.5; previously weaned off 6/22/22, restarted ~ 12 PM 6/23/22  - Afterload reduction:   - continue to hold hydralazine 50 mg, isordil 20 mg    - odd nitroprusside since 6/24 in the AM  - Fluid status: Goal CVP 8-10   - hold off diuresis today 6/23, continue with hemodynamic guided diuresis  - Anticoagulation:    - warfarin on hold   - low intensity heparin gtt continued for h/o DVT/PE    - restarted 6PM 6/23 after subclavian IABP placement  - Antiplatelet: Continue ASA-81mg   - Statin: hold statin   - BMP BID, K>4 & Mg>2  - Strict I/Os: Goal even to positive for low CVP  - Daily weight    ===========   PULMONARY  ===========  # Mild-Moderate Emphysema  # Hx of COVID-19  Former smoker 2 ppd for 40 years quit on 09/09/09.  - Appreciate pulmonary consultation given emphysema   - ILD panel:     - AAT 1 level: in process    - autoimmune work- up:     - LASHANDA positive     - Anti Helen-1 IgG negative     - SSA RO, SSB LA Ab negative     - Scleroderma ab scl 70 negative     - RF negative     - anti CCP 1.7     - ANCA IgG < 1:10     - Aldolase: 19.6(elevated)   - no need for PFTs with DLCO     - if symptoms are considered related to pulm findings: inhaler therapy with LABA/LAMA combination   - will need outpatient pulmonary follow up with seiral PFT hs- CT scan with inspiration and expiration views    =====  Renal  =====  -Strict I&O while diuresing   -Monitor Electrolytes while actively diuresing K->4 Mg->2    ===================    GASTROENTEROLOGY  ===================  # Severe malnutrition in the context of chronic illness  # Liver injury, in the setting of cardiogenic shock  # GERD  - monitor  - Diuresis as above  - Supplements to help maintain nutrition.  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  - continue low intensity heparin gtt    # Anemia, acute on chronic  Baseline hgb 8-9. Started to downtrend since nose bleed 6/22. Gradually downtrending to 7.1 this AM 6/24. No further signs of bleeding -- slightly on subclavian IABP site.   - iron panel sent  - switched to pediatric tubes for labs, limited blood draw  - goal hemoglobin 6 - 6.5  - monitor for signs of bleeding  - continue pta ferrous sulfate 325mg daily     # Thrombocytopenia  - continue to monitor    # Profuse epistaxis from the left nare with bloody emesis  ~ 400 ml of bloody emesis in the blue bag on arrival. Also had one severe epistaxis that occurred at [place] requiring cauterization  - ENT consult placed overnight  - Left nasal cavity packed with all absorbable packing - surgifoam + surgicel + surgiflo. No antibiotics are indicated due to absorbable packing.  - saline nasal pray q3h  - if begins to  in quantity of bleeding would recommend spraying the nasal cavity copiously with afrin and holding pressure over the soft part of the nose for 20 minutes continuously. Nasal clips are ineffective and should not be used in place of digital pressure. If bleeding continues despite these measures, repeat. If bleeding continues or is severe please contact ENT.    ==   ID  ==  ROHIT      ===============  RHEUMATOLOGY  ===============  #Lupus  # APL  - Continue pta hydroxychloroquine 200mg bid   - PTA mycophenolic acid 1500mg q12h on hold in anticipation for LVAD  -- Would plan to hold MMF one week prior to surgery and re-starting 5-7 days after surgery in the absence of surgical site infection, poor wound healing or infection elsewhere.     ===    ===  - Continue pta tamsulosin 0.4mg daily     Diet: Low salt diet <2 g/24 hours  DVT Prophylaxis: Heparin GTT  Fitzgerald Catheter: In place  Code Status: Full   Lines: PICC Line     LVAD/Transplant Evaluation Checklist  [x] labs (CBC, CMP, PT/INR, cystatin C, prealbumin, UA + micro)  [x] Infectious (Hep A/B/C, HIV, treponema, HSV 1/2 IgG, CMV IgG, Toxo IgG, EBV IgG, varicella IgG, Quant gold, COVID vaccine/PCR)  [x] Utox/nicotine and cotinine/PeTH   [x] Immunocompatibility (last transfusion, ABO, HLA tissue typing, PRA)  [x] ECG, Echo  [x] CPX - can be oupatient  [x] 6MWT - can be outpatient  [x] RHC, completed but needs repeat in 2-3 months to assess PVR  [x] CVTS consult  [x] Social work consult  [x]  Palliative care consult: final note pending  [x] Neuropsych consult: order placed: final note pending  [x] Nutrition consult  [x] CT Dental   [x] Dental consult  [x] Abd US + doppler  - Abd US complete done, not with doppler; OK given unremarkable CT CAP  [x] Extremity US and ABIs  [x] Carotid US (if DM or ICM or >51yo)  [x] PFTs: outpatient  [x] Dexa: outpatient  [x] CT head non-contrast  [x] CT CAP non-contrast  [x] Colonoscopy (>51 yo)  [x] PSA/HCG    Staffed with Dr. Mendez.    Liborio Layton MD   PGY-2 Internal Medicine  ----------------------------------------------------------------------------------------------------------------    Interval History    NAEO. Still eat very little ~ 20%. Ambulate okay. Denies N/V, abdominal pain. Denies lightheadedness, shortness of breath, fever/chills. Slept well.      Physical Exam   Temp: 97.8  F  "(36.6  C) Temp src: Oral BP: 98/70 Pulse: 103   Resp: 16 SpO2: 97 % O2 Device: None (Room air)    Vitals:    06/23/22 0600 06/24/22 0400 06/25/22 0400   Weight: 66.3 kg (146 lb 2.6 oz) 67.6 kg (149 lb 0.5 oz) 64.8 kg (142 lb 13.7 oz)     Vital Signs with Ranges  Temp:  [97.3  F (36.3  C)-98.6  F (37  C)] 97.8  F (36.6  C)  Pulse:  [] 103  Resp:  [9-37] 16  BP: ()/(50-78) 98/70  MAP:  [31 mmHg-51 mmHg] 31 mmHg  Arterial Line BP: (61-67)/(23-33) 67/33  SpO2:  [91 %-100 %] 97 %  I/O last 3 completed shifts:  In: 1444.65 [P.O.:620; I.V.:824.65]  Out: 2325 [Urine:2325]     , Blood pressure 98/70, pulse 103, temperature 97.8  F (36.6  C), temperature source Oral, resp. rate 16, height 1.702 m (5' 7\"), weight 64.8 kg (142 lb 13.7 oz), SpO2 97 %.  142 lbs 13.73 oz  GEN:  Alert, oriented x 3, appears comfortable, NAD.  CV:  2+ bilateral radial pulses present. Good peripheral perfusion.   Chest wall: IABP on right upper chest wall without discharge/visible bleeding. Marked tenderness overlying IABP.   LUNGS:  Clear to auscultation bilaterally   ABD:  Active bowel sounds, soft, mildly distended, no significant tenderness/discomfort.  No rebound/guarding/rigidity.  Right groin area: prior IABP site without hematoma  EXT:  No edema or cyanosis.    Neuro: A&O *3, nonfocal    Medications     DOBUTamine 2.5 mcg/kg/min (06/25/22 1700)     heparin 1,200 Units/hr (06/25/22 1700)       aspirin  81 mg Oral Daily     DULoxetine  30 mg Oral Daily     ferrous sulfate  325 mg Oral Daily with lunch     fluticasone-vilanterol  1 puff Inhalation Daily     hydroxychloroquine  200 mg Oral BID     lidocaine  2 patch Transdermal Q24h    And     lidocaine   Transdermal Q8H CAMMY     polyethylene glycol  17 g Oral TID     [Held by provider] potassium chloride  20 mEq Oral BID     senna-docusate  3 tablet Oral BID     sodium chloride  2 spray Both Nostrils Q3H     tamsulosin  0.4 mg Oral Daily     thiamine  100 mg Oral Daily     traZODone  " 100 mg Oral At Bedtime       Data   Recent Labs   Lab 06/25/22  0355 06/24/22  2059 06/24/22  1711 06/24/22  1602 06/24/22  1005 06/24/22  0350 06/23/22  2151 06/23/22  1049 06/23/22  0340   WBC 8.4  --   --   --   --  9.8 8.7   < > 6.3   HGB 7.5*  --  7.5*  --   --  7.1* 7.6*   < > 7.9*   MCV 94  --   --   --   --  96 94   < > 95   *  --   --   --   --  207 189   < > 207   INR 1.36*  --   --   --   --  1.42*  --   --  1.24*   * 132*  --  134*   < > 130* 134*   < > 136   POTASSIUM 4.0 3.8  --  3.6   < > 4.0 3.8   < > 4.4   CHLORIDE 99 98  --  100   < > 99 101   < > 103   CO2 21* 23  --  20*   < > 22 22   < > 22   BUN 11.6 13.3  --  13.0   < > 15.6 19.4   < > 18.6   CR 0.51* 0.64*  --  0.73   < > 0.62* 0.77   < > 0.69   ANIONGAP 12 11  --  14   < > 9 11   < > 11   ELDA 8.7* 8.9  --  8.5*   < > 8.6* 9.1   < > 8.8   * 134*  --  94   < > 133* 154*   < > 180*   ALBUMIN 3.5 3.9  --  3.7   < > 3.0* 3.3*   < > 3.1*   PROTTOTAL 5.8* 6.1*  --  6.1*   < > 5.7* 6.0*   < > 5.8*   BILITOTAL 0.6 0.6  --  0.5   < > 0.5 0.4   < > 0.6   ALKPHOS 94 101  --  102   < > 92 104   < > 102   * 242*  --  274*   < > 282* 333*   < > 334*   AST 56* 63*  --  71*   < > 79* 96*   < > 116*    < > = values in this interval not displayed.       Recent Results (from the past 24 hour(s))   XR Chest Port 1 View    Narrative    Exam: XR CHEST PORT 1 VIEW, 6/25/2022 4:45 AM    Comparison: 6/24/2022    History: [place] and IABP placement    Findings:  Single portable semiupright view of the chest is obtained. Left chest  cardiac device is in place, leads appear intact. Right internal  jugular [place]-Janae catheter tip terminates over the left main pulmonary  artery. Right upper extremity PICC tip terminates in the midsuperior  vena cava. Aortic balloon pump marker overlying the aortic arch.    Trachea is midline. Mediastinum is within normal limits. Heart size  unchanged Reduced patchy opacities. There is no pneumothorax or  pleural  effusion. The upper abdomen is unremarkable.      Impression    Impression:   1. Reduced patchy opacities.  2. [place]-Gilma catheter tip now projects over the left main pulmonary  artery, IABP overlies the aortic arch. Support devices are otherwise  unchanged.    I have personally reviewed the examination and initial interpretation  and I agree with the findings.    KHADAR CAMPOS MD         SYSTEM ID:  [id number]   XR Abdomen Port 1 View    Narrative    Exam: XR ABDOMEN PORT 1 VIEWS, 6/25/2022 4:48 AM    Indication: IABP position    Comparison: X-ray abdomen 6/24/2022    Findings:   Single portable supine view the abdomen is obtained. Nonobstructive  bowel gas pattern. Inferior vena cava filter at the level of L2-3.  Intervertebral balloon pump near T12-L1.      Impression    Impression:   1. Nonobstructive bowel gas pattern.  2. Similar position of aortic balloon pump marker and inferior vena  cava filter.    I have personally reviewed the examination and initial interpretation  and I agree with the findings.    KHADAR CAMPOS MD         SYSTEM ID:  [id number]   XR Chest Port 1 View    Narrative    XR CHEST PORT 1 VIEW  6/25/2022 11:12 AM      HISTORY: Monroe gilma and balloon pump placement    COMPARISON: 6/25/2022, 6/24/2020    FINDINGS: Frontal abdominal radiograph. Stable implantable cardiac  device, epicardial pacer wires, Monroe-Gilma catheter, intra-arterial  balloon pump sheath, PICC line. Stable mediastinal silhouette. No  focal pulmonary opacity, pleural effusion, or pneumothorax. Bones and  upper abdomen are unremarkable.      Impression    IMPRESSION: Monroe-Gilma catheter tip projects over the pulmonary trunk,  IABP superior marker projects at the level of the [place]. Otherwise  stable exam.    I have personally reviewed the examination and initial interpretation  and I agree with the findings.    KHADAR CAMPOS MD         SYSTEM ID:  [id number]

## 2022-06-26 ENCOUNTER — APPOINTMENT (OUTPATIENT)
Dept: GENERAL RADIOLOGY | Facility: CLINIC | Age: 62
DRG: 001 | End: 2022-06-26
Attending: INTERNAL MEDICINE
Payer: COMMERCIAL

## 2022-06-26 LAB
ALBUMIN SERPL BCG-MCNC: 2.9 G/DL (ref 3.5–5.2)
ALP SERPL-CCNC: 118 U/L (ref 40–129)
ALT SERPL W P-5'-P-CCNC: 160 U/L (ref 10–50)
ANION GAP SERPL CALCULATED.3IONS-SCNC: 12 MMOL/L (ref 7–15)
AST SERPL W P-5'-P-CCNC: ABNORMAL U/L
BASE EXCESS BLDV CALC-SCNC: -0.8 MMOL/L (ref -7.7–1.9)
BASE EXCESS BLDV CALC-SCNC: 0.3 MMOL/L (ref -7.7–1.9)
BASE EXCESS BLDV CALC-SCNC: 1.4 MMOL/L (ref -7.7–1.9)
BILIRUB SERPL-MCNC: 0.4 MG/DL
BUN SERPL-MCNC: 13.2 MG/DL (ref 8–23)
CALCIUM SERPL-MCNC: 8.5 MG/DL (ref 8.8–10.2)
CHLORIDE SERPL-SCNC: 100 MMOL/L (ref 98–107)
CREAT SERPL-MCNC: 0.72 MG/DL (ref 0.67–1.17)
DEPRECATED HCO3 PLAS-SCNC: 19 MMOL/L (ref 22–29)
ERYTHROCYTE [DISTWIDTH] IN BLOOD BY AUTOMATED COUNT: 17.5 % (ref 10–15)
GFR SERPL CREATININE-BSD FRML MDRD: >90 ML/MIN/1.73M2
GLUCOSE SERPL-MCNC: 99 MG/DL (ref 70–99)
HCO3 BLDV-SCNC: 23 MMOL/L (ref 21–28)
HCO3 BLDV-SCNC: 25 MMOL/L (ref 21–28)
HCO3 BLDV-SCNC: 26 MMOL/L (ref 21–28)
HCT VFR BLD AUTO: 22.1 % (ref 40–53)
HGB BLD-MCNC: 6.7 G/DL (ref 13.3–17.7)
INR PPP: 1.22 (ref 0.85–1.15)
MAGNESIUM SERPL-MCNC: 1.8 MG/DL (ref 1.7–2.3)
MCH RBC QN AUTO: 28.6 PG (ref 26.5–33)
MCHC RBC AUTO-ENTMCNC: 30.3 G/DL (ref 31.5–36.5)
MCV RBC AUTO: 94 FL (ref 78–100)
O2/TOTAL GAS SETTING VFR VENT: 21 %
OXYHGB MFR BLDV: 47 % (ref 70–75)
OXYHGB MFR BLDV: 50 % (ref 70–75)
OXYHGB MFR BLDV: 57 % (ref 70–75)
PCO2 BLDV: 35 MM HG (ref 40–50)
PCO2 BLDV: 39 MM HG (ref 40–50)
PCO2 BLDV: 39 MM HG (ref 40–50)
PH BLDV: 7.42 [PH] (ref 7.32–7.43)
PH BLDV: 7.43 [PH] (ref 7.32–7.43)
PH BLDV: 7.44 [PH] (ref 7.32–7.43)
PHOSPHATE SERPL-MCNC: 3.5 MG/DL (ref 2.5–4.5)
PLATELET # BLD AUTO: 250 10E3/UL (ref 150–450)
PO2 BLDV: 28 MM HG (ref 25–47)
PO2 BLDV: 30 MM HG (ref 25–47)
PO2 BLDV: 33 MM HG (ref 25–47)
POTASSIUM SERPL-SCNC: 4.1 MMOL/L (ref 3.4–5.3)
PROT SERPL-MCNC: 5.8 G/DL (ref 6.4–8.3)
RBC # BLD AUTO: 2.34 10E6/UL (ref 4.4–5.9)
SODIUM SERPL-SCNC: 131 MMOL/L (ref 136–145)
UFH PPP CHRO-ACNC: 0.33 IU/ML
WBC # BLD AUTO: 7.8 10E3/UL (ref 4–11)

## 2022-06-26 PROCEDURE — 99291 CRITICAL CARE FIRST HOUR: CPT | Mod: GC | Performed by: INTERNAL MEDICINE

## 2022-06-26 PROCEDURE — 250N000011 HC RX IP 250 OP 636: Performed by: STUDENT IN AN ORGANIZED HEALTH CARE EDUCATION/TRAINING PROGRAM

## 2022-06-26 PROCEDURE — 85520 HEPARIN ASSAY: CPT | Performed by: INTERNAL MEDICINE

## 2022-06-26 PROCEDURE — 999N000075 HC STATISTIC IABP MONITORING

## 2022-06-26 PROCEDURE — 250N000013 HC RX MED GY IP 250 OP 250 PS 637: Performed by: INTERNAL MEDICINE

## 2022-06-26 PROCEDURE — 71045 X-RAY EXAM CHEST 1 VIEW: CPT

## 2022-06-26 PROCEDURE — 82805 BLOOD GASES W/O2 SATURATION: CPT

## 2022-06-26 PROCEDURE — 250N000013 HC RX MED GY IP 250 OP 250 PS 637: Performed by: STUDENT IN AN ORGANIZED HEALTH CARE EDUCATION/TRAINING PROGRAM

## 2022-06-26 PROCEDURE — 250N000013 HC RX MED GY IP 250 OP 250 PS 637

## 2022-06-26 PROCEDURE — 85027 COMPLETE CBC AUTOMATED: CPT | Performed by: STUDENT IN AN ORGANIZED HEALTH CARE EDUCATION/TRAINING PROGRAM

## 2022-06-26 PROCEDURE — 83735 ASSAY OF MAGNESIUM: CPT | Performed by: INTERNAL MEDICINE

## 2022-06-26 PROCEDURE — 84155 ASSAY OF PROTEIN SERUM: CPT | Performed by: STUDENT IN AN ORGANIZED HEALTH CARE EDUCATION/TRAINING PROGRAM

## 2022-06-26 PROCEDURE — 999N000155 HC STATISTIC RAPCV CVP MONITORING

## 2022-06-26 PROCEDURE — 80069 RENAL FUNCTION PANEL: CPT | Performed by: INTERNAL MEDICINE

## 2022-06-26 PROCEDURE — 200N000002 HC R&B ICU UMMC

## 2022-06-26 PROCEDURE — 85610 PROTHROMBIN TIME: CPT | Performed by: INTERNAL MEDICINE

## 2022-06-26 PROCEDURE — 71045 X-RAY EXAM CHEST 1 VIEW: CPT | Mod: 26 | Performed by: RADIOLOGY

## 2022-06-26 PROCEDURE — 82805 BLOOD GASES W/O2 SATURATION: CPT | Performed by: STUDENT IN AN ORGANIZED HEALTH CARE EDUCATION/TRAINING PROGRAM

## 2022-06-26 PROCEDURE — 258N000003 HC RX IP 258 OP 636: Performed by: STUDENT IN AN ORGANIZED HEALTH CARE EDUCATION/TRAINING PROGRAM

## 2022-06-26 PROCEDURE — 999N000045 HC STATISTIC DAILY SWAN MONITORING

## 2022-06-26 RX ORDER — DIPHENHYDRAMINE HYDROCHLORIDE 50 MG/ML
50 INJECTION INTRAMUSCULAR; INTRAVENOUS
Status: DISCONTINUED | OUTPATIENT
Start: 2022-06-26 | End: 2022-07-03

## 2022-06-26 RX ORDER — METHYLPREDNISOLONE SODIUM SUCCINATE 125 MG/2ML
125 INJECTION, POWDER, LYOPHILIZED, FOR SOLUTION INTRAMUSCULAR; INTRAVENOUS
Status: DISCONTINUED | OUTPATIENT
Start: 2022-06-26 | End: 2022-07-03

## 2022-06-26 RX ADMIN — FLUTICASONE FUROATE AND VILANTEROL TRIFENATATE 1 PUFF: 100; 25 POWDER RESPIRATORY (INHALATION) at 08:34

## 2022-06-26 RX ADMIN — HYDROXYCHLOROQUINE SULFATE 200 MG: 200 TABLET, FILM COATED ORAL at 08:33

## 2022-06-26 RX ADMIN — IRON SUCROSE 300 MG: 20 INJECTION, SOLUTION INTRAVENOUS at 12:01

## 2022-06-26 RX ADMIN — Medication 325 MG: at 12:01

## 2022-06-26 RX ADMIN — OXYCODONE HYDROCHLORIDE 5 MG: 5 TABLET ORAL at 19:46

## 2022-06-26 RX ADMIN — LIDOCAINE PATCH 4% 2 PATCH: 40 PATCH TOPICAL at 19:49

## 2022-06-26 RX ADMIN — ASPIRIN 81 MG CHEWABLE TABLET 81 MG: 81 TABLET CHEWABLE at 08:33

## 2022-06-26 RX ADMIN — TRAZODONE HYDROCHLORIDE 100 MG: 50 TABLET ORAL at 21:30

## 2022-06-26 RX ADMIN — SENNOSIDES AND DOCUSATE SODIUM 2 TABLET: 8.6; 5 TABLET ORAL at 08:33

## 2022-06-26 RX ADMIN — HYDROXYCHLOROQUINE SULFATE 200 MG: 200 TABLET, FILM COATED ORAL at 19:47

## 2022-06-26 RX ADMIN — SALINE NASAL SPRAY 2 SPRAY: 1.5 SOLUTION NASAL at 08:36

## 2022-06-26 RX ADMIN — HEPARIN SODIUM 1200 UNITS/HR: 10000 INJECTION, SOLUTION INTRAVENOUS at 16:03

## 2022-06-26 RX ADMIN — Medication 10 MG: at 19:46

## 2022-06-26 RX ADMIN — DULOXETINE 30 MG: 30 CAPSULE, DELAYED RELEASE ORAL at 08:32

## 2022-06-26 RX ADMIN — POLYETHYLENE GLYCOL 3350 17 G: 17 POWDER, FOR SOLUTION ORAL at 14:06

## 2022-06-26 RX ADMIN — SALINE NASAL SPRAY 2 SPRAY: 1.5 SOLUTION NASAL at 02:18

## 2022-06-26 RX ADMIN — SALINE NASAL SPRAY 2 SPRAY: 1.5 SOLUTION NASAL at 05:33

## 2022-06-26 RX ADMIN — TAMSULOSIN HYDROCHLORIDE 0.4 MG: 0.4 CAPSULE ORAL at 08:32

## 2022-06-26 RX ADMIN — THIAMINE HCL TAB 100 MG 100 MG: 100 TAB at 08:33

## 2022-06-26 ASSESSMENT — ACTIVITIES OF DAILY LIVING (ADL)
ADLS_ACUITY_SCORE: 29
ADLS_ACUITY_SCORE: 29
ADLS_ACUITY_SCORE: 28
ADLS_ACUITY_SCORE: 29
ADLS_ACUITY_SCORE: 28
ADLS_ACUITY_SCORE: 28
ADLS_ACUITY_SCORE: 29
ADLS_ACUITY_SCORE: 28
ADLS_ACUITY_SCORE: 29

## 2022-06-26 NOTE — PROGRESS NOTES
Pt continues to have better appetite eating  % of meals ordered.  Up in the recliner most of this shift and ambulated in the freitas approximately 90 yards.  Pt received bowl medications.  On commode once and passed gas only. Last BM 6/23.  For vitals signs and complete assessments, see documentation flow sheet.  Will continue to monitor.

## 2022-06-26 NOTE — PLAN OF CARE
Major Shift Events:  Shift [id number]    No acute events overnight, patient sleep well [id number]    Neuro: Patient AOx4, calls appropriately, makes needs known. PERRLA. Oxy 5mg given once with PM meds for incisional pain.     Cardiac: SR/Sinus Tach. CRT-D, AAA<=DDD , MAP goal>65, IABP MAPs not accurate, following blood pressure off cuff pressures per cardiology note, 1:1, 100% augmentation subclavian IABP. Augmentation more optimal while patient is awake. Dobutamine 2.5mcg/kg/min, CVP goal 8-10. PA [place] @ 49, Q6 AZUL numbers.   SvO2 48%, 57%  CVP 9, 9  PA 46/21, 42/16  CO 4.5, 6.2  CI 2.6, 3.5  SVR 1128, 910   See flowsheets for complete AZUL calculations.     Resp: oxygen saturation WNL on room air. Clear lung sounds.     GI/: No BM for shift, passing gas, active bowel sounds. Patient voids spontaneously using bedside urinal, shift total 1610.    Heparin gtt 1200units/hr - AM Xa 0.33 - therapeutic - recheck tomorrow morning  AM hgb 7.5>6.7 - Cards 2 called, no new orders  Still waiting for other morning labs to result.    Plan: Status 2 for heart transplant. Continue POC.     For vital signs and complete assessments, please see documentation flowsheets.        Goal Outcome Evaluation:    Plan of Care Reviewed With: patient     Overall Patient Progress: no change    Outcome Evaluation: Using cuff pressures due to IABP MAPs not accurate. VSS on room air. Awaiting transplant.    Problem: Adjustment to Illness (Heart Failure)  Goal: Optimal Coping  Intervention: Support Psychosocial Response  Recent Flowsheet Documentation  Taken 6/26/2022 0400 by Miracle Eldridge, RN  Supportive Measures:    active listening utilized    decision-making supported    goal-setting facilitated    positive reinforcement provided    relaxation techniques promoted    verbalization of feelings encouraged  Taken 6/26/2022 0000 by Miracle Eldridge, RN  Supportive Measures:    active listening utilized    decision-making supported     goal-setting facilitated    positive reinforcement provided    relaxation techniques promoted    verbalization of feelings encouraged  Taken 6/25/2022 2200 by Miracle Eldridge RN  Supportive Measures:    active listening utilized    decision-making supported    goal-setting facilitated    positive reinforcement provided    relaxation techniques promoted    verbalization of feelings encouraged

## 2022-06-27 ENCOUNTER — APPOINTMENT (OUTPATIENT)
Dept: GENERAL RADIOLOGY | Facility: CLINIC | Age: 62
DRG: 001 | End: 2022-06-27
Attending: INTERNAL MEDICINE
Payer: COMMERCIAL

## 2022-06-27 LAB
ALBUMIN SERPL BCG-MCNC: 3.2 G/DL (ref 3.5–5.2)
ALP SERPL-CCNC: 101 U/L (ref 40–129)
ALT SERPL W P-5'-P-CCNC: 114 U/L (ref 10–50)
ANION GAP SERPL CALCULATED.3IONS-SCNC: 10 MMOL/L (ref 7–15)
AST SERPL W P-5'-P-CCNC: 31 U/L (ref 10–50)
BASE EXCESS BLDV CALC-SCNC: 0.3 MMOL/L (ref -7.7–1.9)
BILIRUB SERPL-MCNC: 0.4 MG/DL
BUN SERPL-MCNC: 9.4 MG/DL (ref 8–23)
CALCIUM SERPL-MCNC: 8.8 MG/DL (ref 8.8–10.2)
CHLORIDE SERPL-SCNC: 101 MMOL/L (ref 98–107)
CREAT SERPL-MCNC: 0.56 MG/DL (ref 0.67–1.17)
DEPRECATED HCO3 PLAS-SCNC: 19 MMOL/L (ref 22–29)
ERYTHROCYTE [DISTWIDTH] IN BLOOD BY AUTOMATED COUNT: 17.2 % (ref 10–15)
GFR SERPL CREATININE-BSD FRML MDRD: >90 ML/MIN/1.73M2
GLUCOSE BLDC GLUCOMTR-MCNC: 111 MG/DL (ref 70–99)
GLUCOSE SERPL-MCNC: 106 MG/DL (ref 70–99)
HCO3 BLDV-SCNC: 25 MMOL/L (ref 21–28)
HCT VFR BLD AUTO: 22.1 % (ref 40–53)
HGB BLD-MCNC: 6.7 G/DL (ref 13.3–17.7)
HOLD SPECIMEN: NORMAL
INR PPP: 1.23 (ref 0.85–1.15)
MAGNESIUM SERPL-MCNC: 1.9 MG/DL (ref 1.7–2.3)
MCH RBC QN AUTO: 29 PG (ref 26.5–33)
MCHC RBC AUTO-ENTMCNC: 30.3 G/DL (ref 31.5–36.5)
MCV RBC AUTO: 96 FL (ref 78–100)
O2/TOTAL GAS SETTING VFR VENT: 21 %
OXYHGB MFR BLDV: 58 % (ref 70–75)
PCO2 BLDV: 39 MM HG (ref 40–50)
PH BLDV: 7.42 [PH] (ref 7.32–7.43)
PLATELET # BLD AUTO: 283 10E3/UL (ref 150–450)
PO2 BLDV: 34 MM HG (ref 25–47)
POTASSIUM SERPL-SCNC: 4 MMOL/L (ref 3.4–5.3)
PROT SERPL-MCNC: 5.6 G/DL (ref 6.4–8.3)
RBC # BLD AUTO: 2.31 10E6/UL (ref 4.4–5.9)
SODIUM SERPL-SCNC: 130 MMOL/L (ref 136–145)
UFH PPP CHRO-ACNC: 0.38 IU/ML
WBC # BLD AUTO: 6.2 10E3/UL (ref 4–11)

## 2022-06-27 PROCEDURE — 85520 HEPARIN ASSAY: CPT | Performed by: INTERNAL MEDICINE

## 2022-06-27 PROCEDURE — 999N000075 HC STATISTIC IABP MONITORING

## 2022-06-27 PROCEDURE — 71045 X-RAY EXAM CHEST 1 VIEW: CPT

## 2022-06-27 PROCEDURE — 999N000127 HC STATISTIC PERIPHERAL IV START W US GUIDANCE

## 2022-06-27 PROCEDURE — 250N000013 HC RX MED GY IP 250 OP 250 PS 637: Performed by: INTERNAL MEDICINE

## 2022-06-27 PROCEDURE — 85610 PROTHROMBIN TIME: CPT | Performed by: INTERNAL MEDICINE

## 2022-06-27 PROCEDURE — 80053 COMPREHEN METABOLIC PANEL: CPT | Performed by: STUDENT IN AN ORGANIZED HEALTH CARE EDUCATION/TRAINING PROGRAM

## 2022-06-27 PROCEDURE — 250N000013 HC RX MED GY IP 250 OP 250 PS 637: Performed by: STUDENT IN AN ORGANIZED HEALTH CARE EDUCATION/TRAINING PROGRAM

## 2022-06-27 PROCEDURE — 99291 CRITICAL CARE FIRST HOUR: CPT | Mod: GC | Performed by: INTERNAL MEDICINE

## 2022-06-27 PROCEDURE — 85027 COMPLETE CBC AUTOMATED: CPT | Performed by: INTERNAL MEDICINE

## 2022-06-27 PROCEDURE — 250N000013 HC RX MED GY IP 250 OP 250 PS 637

## 2022-06-27 PROCEDURE — 258N000003 HC RX IP 258 OP 636: Performed by: STUDENT IN AN ORGANIZED HEALTH CARE EDUCATION/TRAINING PROGRAM

## 2022-06-27 PROCEDURE — 250N000011 HC RX IP 250 OP 636

## 2022-06-27 PROCEDURE — 36415 COLL VENOUS BLD VENIPUNCTURE: CPT | Performed by: INTERNAL MEDICINE

## 2022-06-27 PROCEDURE — 250N000011 HC RX IP 250 OP 636: Performed by: STUDENT IN AN ORGANIZED HEALTH CARE EDUCATION/TRAINING PROGRAM

## 2022-06-27 PROCEDURE — 250N000011 HC RX IP 250 OP 636: Performed by: INTERNAL MEDICINE

## 2022-06-27 PROCEDURE — 71045 X-RAY EXAM CHEST 1 VIEW: CPT | Mod: 26 | Performed by: RADIOLOGY

## 2022-06-27 PROCEDURE — 200N000002 HC R&B ICU UMMC

## 2022-06-27 PROCEDURE — 83735 ASSAY OF MAGNESIUM: CPT | Performed by: INTERNAL MEDICINE

## 2022-06-27 PROCEDURE — 82805 BLOOD GASES W/O2 SATURATION: CPT | Performed by: STUDENT IN AN ORGANIZED HEALTH CARE EDUCATION/TRAINING PROGRAM

## 2022-06-27 RX ORDER — MAGNESIUM SULFATE HEPTAHYDRATE 40 MG/ML
2 INJECTION, SOLUTION INTRAVENOUS ONCE
Status: COMPLETED | OUTPATIENT
Start: 2022-06-27 | End: 2022-06-27

## 2022-06-27 RX ADMIN — DULOXETINE 30 MG: 30 CAPSULE, DELAYED RELEASE ORAL at 08:13

## 2022-06-27 RX ADMIN — ACETAMINOPHEN 325 MG: 325 TABLET, FILM COATED ORAL at 15:10

## 2022-06-27 RX ADMIN — HYDROXYCHLOROQUINE SULFATE 200 MG: 200 TABLET, FILM COATED ORAL at 08:14

## 2022-06-27 RX ADMIN — ASPIRIN 81 MG CHEWABLE TABLET 81 MG: 81 TABLET CHEWABLE at 08:14

## 2022-06-27 RX ADMIN — THIAMINE HCL TAB 100 MG 100 MG: 100 TAB at 08:14

## 2022-06-27 RX ADMIN — SALINE NASAL SPRAY 2 SPRAY: 1.5 SOLUTION NASAL at 17:14

## 2022-06-27 RX ADMIN — FLUTICASONE FUROATE AND VILANTEROL TRIFENATATE 1 PUFF: 100; 25 POWDER RESPIRATORY (INHALATION) at 08:14

## 2022-06-27 RX ADMIN — SALINE NASAL SPRAY 2 SPRAY: 1.5 SOLUTION NASAL at 07:18

## 2022-06-27 RX ADMIN — HEPARIN SODIUM 1200 UNITS/HR: 10000 INJECTION, SOLUTION INTRAVENOUS at 15:16

## 2022-06-27 RX ADMIN — SALINE NASAL SPRAY 2 SPRAY: 1.5 SOLUTION NASAL at 19:55

## 2022-06-27 RX ADMIN — HYDROXYCHLOROQUINE SULFATE 200 MG: 200 TABLET, FILM COATED ORAL at 19:39

## 2022-06-27 RX ADMIN — OXYCODONE HYDROCHLORIDE 5 MG: 5 TABLET ORAL at 20:47

## 2022-06-27 RX ADMIN — Medication 10 MG: at 20:47

## 2022-06-27 RX ADMIN — POLYETHYLENE GLYCOL 3350 17 G: 17 POWDER, FOR SOLUTION ORAL at 08:14

## 2022-06-27 RX ADMIN — SODIUM CHLORIDE 250 ML: 9 INJECTION, SOLUTION INTRAVENOUS at 00:12

## 2022-06-27 RX ADMIN — SALINE NASAL SPRAY 2 SPRAY: 1.5 SOLUTION NASAL at 11:18

## 2022-06-27 RX ADMIN — SALINE NASAL SPRAY 2 SPRAY: 1.5 SOLUTION NASAL at 14:14

## 2022-06-27 RX ADMIN — SENNOSIDES AND DOCUSATE SODIUM 3 TABLET: 8.6; 5 TABLET ORAL at 08:14

## 2022-06-27 RX ADMIN — DOBUTAMINE HYDROCHLORIDE 2.5 MCG/KG/MIN: 200 INJECTION INTRAVENOUS at 15:15

## 2022-06-27 RX ADMIN — TAMSULOSIN HYDROCHLORIDE 0.4 MG: 0.4 CAPSULE ORAL at 08:14

## 2022-06-27 RX ADMIN — MAGNESIUM SULFATE HEPTAHYDRATE 2 G: 40 INJECTION, SOLUTION INTRAVENOUS at 04:52

## 2022-06-27 RX ADMIN — TRAZODONE HYDROCHLORIDE 100 MG: 50 TABLET ORAL at 20:47

## 2022-06-27 RX ADMIN — POLYETHYLENE GLYCOL 3350 17 G: 17 POWDER, FOR SOLUTION ORAL at 14:14

## 2022-06-27 ASSESSMENT — ACTIVITIES OF DAILY LIVING (ADL)
ADLS_ACUITY_SCORE: 28
ADLS_ACUITY_SCORE: 28
ADLS_ACUITY_SCORE: 29
ADLS_ACUITY_SCORE: 28
ADLS_ACUITY_SCORE: 29
ADLS_ACUITY_SCORE: 28
ADLS_ACUITY_SCORE: 29
ADLS_ACUITY_SCORE: 28
ADLS_ACUITY_SCORE: 28
ADLS_ACUITY_SCORE: 29

## 2022-06-27 NOTE — PLAN OF CARE
Major Shift Events:   Neuro: Patient A/O x 4. Moves all extremities, follows commands. PEERLA 3+. Afebrile. C/O pain at IABP insertion site with movement, PRN tylenol given this afternoon.   Cardiac: ST 100s most of day, HR increased to 120s with activity. IABP 1:1 100%. CVP 12 this morning. Maps 70-80s. Per Cards 2, IABP MAP inaccurate, using cuff pressures instead.   Resp: On room air. Lung sounds clear, diminished in bases. IS performed independently.   GI/: Uses urinal independently. Last BM 6/23, bowel regimen given. Active bowel sounds, passing flatus, not distended. On 2g Na diet and 2L fluid restriction. Calorie count in effect.   Musc: Old right groin site from femoral IABP, WDL dressing CDI. Ambulated in freitas today, tolerated fair. Slightly short of breath after. Up in chair for ~10 hours today. Nasal packing remains in place, per ENT packing is absorbable and will not need to be removed.     Plan: Continue plan of care. Continue IABP 1:1 100%. Remains status 2 on transplant list.    For vital signs and complete assessments, please see documentation flowsheets.

## 2022-06-27 NOTE — PROGRESS NOTES
Cardiology Progress Note 6/23/22    Assessment & Plan   61-year-old man with history of ischemic cardiomyopathy LVEF 15%) NYHA class IV symptoms ACC stage D, multiple admissions with heart failure over the past several months. Presented with cardiogenic shock c/b multi-organ failure (JOSE; Acute liver injury/shock liver) requiring mechanical hemodynamic support. Now listed as status 2 for OHT. Course c/b large epistaxis 6/22 with 400 ml blood. Had subclavian IABP placed instead of femoral IABP 6/23/22. Hemoglobin gradually downtrending without e/o further e/o bleeding. Continue to optimize his hemodynamics while awaiting for heart transplant.      Changes Today:  - remain stable with dobutamine 2.5, IABP 1:1  - appears euvolemic today, defer fluid/diuretics  - s/p subclavian IABP 6/23/22   - IABP MAP not accurate, currently base his blood pressure on his cuff pressure  - Hgb remains >6.5 without signs of bleeding   - plan is to transfuse 2 units if hgb < 6.5,  - promote ambulation + continue caloric count  - remains status 2  - plan for in and out RHC 7/1/22, needs renewal of status 2 on 7/4/22    ===============  CARDIOVASCULAR  ===============  # Ambulatory cardiogenic shock SCAI D  # Advanced ischemic cardiomyopathy, Class IV, Stage D  # s/p CRT-D ([employer] 2006, gen change 2012)  # SVT  # Nonsustained VT  # CAD s/p PCI to LAD (2005)  # Hx of MI (2007)  # Severe ostial LAD disease with in-stent restenosis of mid LAD at diag bifurcation, severe proximal RCA disease, mild circ disease now s/p ostial LAD and proximal RCA lithotripsy and stenting on 5/24/22  CMRI showing infarcted myocardium in LAD territory. Given the degree of LV dysfunction/dilation, moderate to severe functional MR and lack of viable myocardium, cardiac surgery would be high risk. Now s/p ostial LAD and proximal RCA lithotripsy and stenting on 5/24/22.      DATE MAP CVP PAP PCWP Marley CO Marley CI SVR PVR Therapies   6/24/22  10 AM 73 8 36/18 18 5.5  3.1 1163 1.1 - cont IABP 1:1  - cont dobu 2.5  - off nipride  - s/p bumex 2 mg IV*1     6/25/22 72 8 39/21 11 4.9 2.8 1050 3.2 - continue IABP  - continue dobu  - hold off diuresis    6/26/22  (swan pullled) 72 4 40/21 12 4.9 2.8 1110 3 - continue IABP and dobu  - defer IVF despite low CVP   6/27/22 73 10 - - 6.7 3.8 - - - continue IABP and dobu                                         Plan:  He will remain on 1:1 IABP with dobutamine gtt 2.5 to maintain his hemodynamics + hemodynamic-guided diuresis & afterload reduction. Monitor hemodynamic q6h.    - hemodynamic support:   - Continue iABP 1:1    - IABP MAP not accurate, will use cuff pressure for his blood pressure   - Inotropes:              - continue dobutamine 2.5; previously weaned off 6/22/22, restarted ~ 12 PM 6/23/22  - Afterload reduction:   - afterload reduction effect from dobutamine   - continue to hold hydralazine 50 mg, isordil 20 mg    - off nitroprusside since 6/24 in the AM  - Fluid status:    - appears euvolemic today from exam findings: JVP ~ 7- 8 from physical exam   - defer IVF and diuretics   - Goal CVP 8-10  - Anticoagulation:    - warfarin on hold   - low intensity heparin gtt continued for h/o DVT/PE    - restarted 6PM 6/23 after subclavian IABP placement  - Antiplatelet: Continue ASA-81mg   - Statin: hold statin   - BMP BID, K>4 & Mg>2  - Strict I/Os: Goal even to positive for low CVP  - Daily weight    ===========   PULMONARY  ===========  # Mild-Moderate Emphysema  # Hx of COVID-19  Former smoker 2 ppd for 40 years quit on 09/09/09.  - Appreciate pulmonary consultation given emphysema   - ILD panel:     - AAT 1 level: in process    - autoimmune work- up:     - LASHANDA positive     - Anti Helen-1 IgG negative     - SSA RO, SSB LA Ab negative     - Scleroderma ab scl 70 negative     - RF negative     - anti CCP 1.7     - ANCA IgG < 1:10     - Aldolase: 19.6(elevated)   - no need for PFTs with DLCO     - if symptoms are considered related to pulm  findings: inhaler therapy with LABA/LAMA combination   - will need outpatient pulmonary follow up with seiral PFT hs- CT scan with inspiration and expiration views    =====  Renal  =====  -Strict I&O while diuresing   -Monitor Electrolytes while actively diuresing K->4 Mg->2    ===================   GASTROENTEROLOGY  ===================  # Severe malnutrition in the context of chronic illness  # Liver injury, in the setting of cardiogenic shock  # GERD  - monitor  - Diuresis as above  - Supplements to help maintain nutrition.  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  - continue low intensity heparin gtt    # Anemia, acute on chronic  # Iron deficiency  Baseline hgb 8-9. Started to downtrend since nose bleed 6/22. Gradually downtrending without signs of new or ongoing bleeding. Found to have iron deficiency. Hgb today is 6.7.  - defer transfusion at this time; goal Hgb 6.5   - transfuse 2 units if hgb < 6.5, nonirradiated  - continue iron IV *3 doses  - continue pediatric tubes for labs, limited blood draw  - monitor for signs of bleeding    # low platelets 2/2 platelet clumping from lab reports  - continue to monitor    # Profuse epistaxis from the left nare with bloody emesis  ~ 400 ml of bloody emesis in the blue bag on arrival. Also had one severe epistaxis that occurred at [place] requiring cauterization.  - ENT consult placed   - Left nasal cavity packed with all absorbable packing - surgifoam + surgicel + surgiflo. No antibiotics are indicated due to absorbable packing.  - saline nasal pray q3h  - if begins to  in quantity of bleeding would  recommend spraying the nasal cavity copiously with afrin and holding pressure over the soft part of the nose for 20 minutes continuously. Nasal clips are ineffective and should not be used in place of digital pressure. If bleeding continues despite these measures, repeat. If bleeding continues or is severe please contact ENT.    ==   ID  ==  ROHIT     ===============  RHEUMATOLOGY  ===============  #Lupus  # APL  - Continue pta hydroxychloroquine 200mg bid   - PTA mycophenolic acid 1500mg q12h on hold in anticipation for LVAD  -- Would plan to hold MMF one week prior to surgery and re-starting 5-7 days after LVAD placement in the absence of surgical site infection, poor wound healing or infection elsewhere.     ===    ===  - Continue pta tamsulosin 0.4mg daily     Diet: Low salt diet <2 g/24 hours  DVT Prophylaxis: Heparin GTT  Fitzgerald Catheter: In place  Code Status: Full   Lines: PICC Line     LVAD/Transplant Evaluation Checklist  [x] labs (CBC, CMP, PT/INR, cystatin C, prealbumin, UA + micro)  [x] Infectious (Hep A/B/C, HIV, treponema, HSV 1/2 IgG, CMV IgG, Toxo IgG, EBV IgG, varicella IgG, Quant gold, COVID vaccine/PCR)  [x] Utox/nicotine and cotinine/PeTH   [x] Immunocompatibility (last transfusion, ABO, HLA tissue typing, PRA)  [x] ECG, Echo  [x] CPX - can be oupatient  [x] 6MWT - can be outpatient  [x] RHC, completed but needs repeat in 2-3 months to assess PVR  [x] CVTS consult  [x] Social work consult  [x]  Palliative care consult: final note pending  [x] Neuropsych consult: order placed: final note pending  [x] Nutrition consult  [x] CT Dental   [x] Dental consult  [x] Abd US + doppler  - Abd US complete done, not with doppler; OK given unremarkable CT CAP  [x] Extremity US and ABIs  [x] Carotid US (if DM or ICM or >51yo)  [x] PFTs: outpatient  [x] Dexa: outpatient  [x] CT head non-contrast  [x] CT CAP non-contrast  [x] Colonoscopy (>51 yo)  [x] PSA/HCG    Staffed with Dr. Mendez.    Liborio  "MD Kinjal   PGY-2 Internal Medicine  ----------------------------------------------------------------------------------------------------------------    Interval History    NAEO. Ambulating well yesterday. Denies any lightheadedness during ambulation but reports having some shortness of breath without chest pain. Appetite has gotten better. Denies N/V. Denies fever/chills. Denies blurry vision. Denies any bleeding.    Physical Exam   Temp: 98  F (36.7  C) Temp src: Oral BP: 107/59 Pulse: 102   Resp: 20 SpO2: 98 % O2 Device: None (Room air)    Vitals:    06/26/22 1000 06/27/22 0000 06/27/22 0839   Weight: 65 kg (143 lb 6.4 oz) 64.8 kg (142 lb 12.8 oz) 66.1 kg (145 lb 11.2 oz)     Vital Signs with Ranges  Temp:  [97.7  F (36.5  C)-98.4  F (36.9  C)] 98  F (36.7  C)  Pulse:  [] 102  Resp:  [8-33] 20  BP: ()/(51-80) 107/59  SpO2:  [90 %-98 %] 98 %  I/O last 3 completed shifts:  In: 2523.4 [P.O.:1560; I.V.:713.4; IV Piggyback:250]  Out: 2690 [Urine:2690]      Blood pressure 107/59, pulse 102, temperature 98  F (36.7  C), temperature source Oral, resp. rate 20, height 1.702 m (5' 7\"), weight 66.1 kg (145 lb 11.2 oz), SpO2 98 %.  145 lbs 11.2 oz  GEN:  Alert, oriented x 3, appears comfortable, NAD.  ENT: injected conjunctivae, JVP 7-8 cm  CV:  2+ bilateral radial pulses present. Good peripheral perfusion.   Chest wall:  IABP on right upper chest wall without discharge/visible bleeding. Marked tenderness overlying IABP.   LUNGS:  Clear to auscultation bilaterally   ABD:  Active bowel sounds, soft, mildly distended, no significant tenderness/discomfort.  No rebound/guarding/rigidity.  Right groin area: prior IABP site without hematoma  EXT:  No edema or cyanosis.    Neuro: A&O *3, nonfocal    Medications    DOBUTamine 2.5 mcg/kg/min (06/27/22 1515)    heparin 1,200 Units/hr (06/27/22 1516)      aspirin  81 mg Oral Daily    DULoxetine  30 mg Oral Daily    fluticasone-vilanterol  1 puff Inhalation Daily "    hydroxychloroquine  200 mg Oral BID    iron sucrose (VENOFER) intermittent infusion  300 mg Intravenous Q72H    lidocaine  2 patch Transdermal Q24h    And    lidocaine   Transdermal Q8H CAMMY    melatonin  10 mg Oral At Bedtime    polyethylene glycol  17 g Oral TID    senna-docusate  3 tablet Oral BID    sodium chloride  2 spray Both Nostrils Q3H    tamsulosin  0.4 mg Oral Daily    thiamine  100 mg Oral Daily    traZODone  100 mg Oral At Bedtime       Data   Recent Labs   Lab 06/27/22  0446 06/27/22  0355 06/26/22  0409 06/25/22 2058 06/25/22  0355   WBC 6.2  --  7.8  --  8.4   HGB 6.7*  --  6.7*  --  7.5*   MCV 96  --  94  --  94     --  250  --  137*   INR  --  1.23* 1.22*  --  1.36*   NA  --  130* 131*  --  132*   POTASSIUM  --  4.0 4.1  --  4.0   CHLORIDE  --  101 100  --  99   CO2  --  19* 19*  --  21*   BUN  --  9.4 13.2  --  11.6   CR  --  0.56* 0.72  --  0.51*   ANIONGAP  --  10 12  --  12   ELDA  --  8.8 8.5*  --  8.7*   GLC  --  106* 99 97 133*   ALBUMIN  --  3.2* 2.9*  --  3.5   PROTTOTAL  --  5.6* 5.8*  --  5.8*   BILITOTAL  --  0.4 0.4  --  0.6   ALKPHOS  --  101 118  --  94   ALT  --  114* 160*  --  216*   AST  --  31  --   --  56*       Recent Results (from the past 24 hour(s))   XR Chest Port 1 View    Narrative    Portable chest    INDICATION: Intra-aortic balloon pump position    COMPARISON: 6/26/2022    FINDINGS: Intra-aortic balloon pump marker again is at the mid aortic  knob. Heart size normal. Implantable cardiac defibrillator. Possible  right IJ catheter in the right atrium. Left costophrenic angle  haziness.      Impression    IMPRESSION: Intra-aortic balloon pump marker at the distal aortic  arch. Small left pleural effusion. Implantable cardiac defibrillator.    CONSTANTINO OWEN MD         SYSTEM ID:  [id number]

## 2022-06-27 NOTE — PROGRESS NOTES
"CLINICAL NUTRITION SERVICES - REASSESSMENT NOTE     Nutrition Prescription    RECOMMENDATIONS FOR MDs/PROVIDERS TO ORDER:  -If EN needed post-transplant, please re-consult: \"Registered Dietitian to Assess and Order TF per Medical Nutrition Therapy Protocol\"    Malnutrition Status:    Moderate malnutrition in the context of chronic illness     Recommendations already ordered by Registered Dietitian (RD):  -Continue 100 mg Thiamine daily   -Continue Ensure 1-2x daily    Future/Additional Recommendations:  -Continue to monitor PO intake/adequacy     If EN needed post-transplant recommend:  Osmolite 1.5 Virgilio @ goal of 50ml/hr (1200ml/day) + 1 pkt Prosource TID will provide: 1920 kcals (29 kcal/kg), 108 g PRO (1.6 g/kg), 914 ml free H20, 244 g CHO, and 0 g fiber daily.     EVALUATION OF THE PROGRESS TOWARD GOALS   Diet: 2 g Sodium, 2L fluid restriction    Intake: Pt with good PO intake, eating % at meals per RN documentation. Ordering foods such as breakfast sandwiches, fish, salad, chili, omelet, pudding, juice. Receiving Ensure 1-2x per day. Per calorie counts, pt took in 0 kcal/protein yesterday; likely not accurate as pt ordering 3 meals/d through kitchen and nursing documentation shows %.      NEW FINDINGS   Pt listed as status 2 for heart transplant.     GI: LBM 6/26, on bowel regimen     Labs: Hyponatremia, BG WNL    Medications: Reviewed     Weights: Down 3.1 kg (4.5%) over the past week (however down 5.6L in fluid)  06/27/22 0000 64.8 kg (142 lb 12.8 oz)   06/26/22 1000 65 kg (143 lb 6.4 oz)   06/25/22 0400 64.8 kg (142 lb 13.7 oz)   06/24/22 0400 67.6 kg (149 lb 0.5 oz)   06/23/22 0600 66.3 kg (146 lb 2.6 oz)   06/21/22 0400 66.8 kg (147 lb 4.3 oz)   06/20/22 0000 67.9 kg (149 lb 11.1 oz)     MALNUTRITION  % Intake: No decreased intake noted  % Weight Loss: Weight loss does not meet criteria (likely fluid related losses)  Subcutaneous Fat Loss: Global mild  Muscle Loss: Temporal:  Moderate, Thoracic " region (clavicle, acromium bone, deltoid, trapezius, pectoral):  Mild, Upper leg (quadricep, hamstring):  Mild and Posterior calf:  Moderate  Fluid Accumulation/Edema: None noted  Malnutrition Diagnosis: Moderate malnutrition in the context of chronic illness     Previous Goals   Diet adv v nutrition support within 2-3 days OR Patient to consume % of nutritionally adequate meal trays TID, or the equivalent with supplements/snacks.  Evaluation: Met    Previous Nutrition Diagnosis  Predicted inadequate nutrient intake related to intubation as evidenced by NPO status and possible need for EN post-VAD.     Evaluation: No longer applicable, nutrition diagnosis changed below    CURRENT NUTRITION DIAGNOSIS  Predicted inadequate nutrient intake related to intubation as evidenced by NPO status and possible need for EN post-transplant.     INTERVENTIONS  Implementation  Multi-trace element supplement therapy    Goals  Patient to consume % of nutritionally adequate meal trays TID, or the equivalent with supplements/snacks.    Monitoring/Evaluation  Progress toward goals will be monitored and evaluated per protocol.    Kelsey Prado, MS, RD, LD  4E (CVICU) RD pager: [phone]  Ascom: [id number]  Weekend/Holiday RD pager: [phone]

## 2022-06-27 NOTE — PROGRESS NOTES
Calorie Count  Intake recorded for: 6/26  Total Kcals: 0 Total Protein: 0g  Kcals from Hospital Food: 0   Protein: 0g  Kcals from Outside Food (average):0 Protein: 0g  # Meals Ordered from Kitchen: 3 meals   # Meals Recorded: no intake recorded.   # Supplements Recorded: no intake recorded.

## 2022-06-27 NOTE — PLAN OF CARE
Major Shift Events:  Shift [id number]     No acute events overnight, patient slept well [id number]     Neuro: Patient AOx4, calls appropriately, makes needs known. PERRLA. Oxy 5mg given once with PM meds for incisional pain to right side chest. Scleral hemorrhage bilateral since 6/25, patient states this is common with his lupus.      Cardiac: SR/Sinus Tach. HR . CRT-D, AAA<=DDD , MAP goal>65, IABP MAPs not accurate, following blood pressure off cuff pressures per cardiology note, 1:1, 100% augmentation subclavian IABP. Augmentation more optimal while patient is awake. Dobutamine 2.5mcg/kg/min fixed rate, CVP goal 8-10.     Called Cards 2 Fellow at midnight due to MAPs trending in the lows 60s. MD ordered a 250mL NS bolus. CVP at midnight was 8. Pressure increased WNL, however CVP at 0400 was 16.      Resp: oxygen saturation WNL on room air. Clear lung sounds. Uses bedside IS frequently.      GI/: No BM for shift, passing gas, active bowel sounds. 2G sodium/2L fluid restriction diet ordered. Good appetite. Patient voids spontaneously using bedside urinal, shift total 1475.     Heparin gtt @ 1200units/hr - AM Xa 0.38 - therapeutic - recheck tomorrow morning.  AM magnesium 1.9 - replacement given  AM hgb 6.7 - Cards 2 Lm called - no new orders     Plan: Status 2 for heart transplant. Continue POC.     For vital signs and complete assessments, please see documentation flowsheets.      Goal Outcome Evaluation:    Plan of Care Reviewed With: patient, daughter     Overall Patient Progress: no change    Outcome Evaluation: [place] removed 6/26. Cuff pressures for BP. VSS. IABP 1:1. Status 2 transplant list.      Problem: Plan of Care - These are the overarching goals to be used throughout the patient stay.    Goal: Absence of Hospital-Acquired Illness or Injury  Intervention: Identify and Manage Fall Risk  Recent Flowsheet Documentation  Taken 6/27/2022 0400 by Miracle Eldridge, RN  Safety Promotion/Fall  Prevention:    activity supervised    clutter free environment maintained    fall prevention program maintained    nonskid shoes/slippers when out of bed    increased rounding and observation    increase visualization of patient    lighting adjusted    patient and family education    room near nurse's station    room organization consistent    safety round/check completed    treat reversible contributory factors    treat underlying cause  Taken 6/27/2022 0000 by Miracle Eldridge, RN  Safety Promotion/Fall Prevention:    activity supervised    clutter free environment maintained    fall prevention program maintained    nonskid shoes/slippers when out of bed    increased rounding and observation    increase visualization of patient    lighting adjusted    patient and family education    room near nurse's station    room organization consistent    safety round/check completed    treat reversible contributory factors    treat underlying cause  Taken 6/26/2022 2000 by Miracle Eldridge, RN  Safety Promotion/Fall Prevention:    activity supervised    clutter free environment maintained    fall prevention program maintained    nonskid shoes/slippers when out of bed    increased rounding and observation    increase visualization of patient    lighting adjusted    patient and family education    room near nurse's station    room organization consistent    safety round/check completed    treat reversible contributory factors    treat underlying cause     Problem: Plan of Care - These are the overarching goals to be used throughout the patient stay.    Goal: Absence of Hospital-Acquired Illness or Injury  Intervention: Prevent Infection  Recent Flowsheet Documentation  Taken 6/27/2022 0400 by Miracle Eldridge, RN  Infection Prevention:    cohorting utilized    environmental surveillance performed    equipment surfaces disinfected    hand hygiene promoted    personal protective equipment utilized    rest/sleep promoted    single  patient room provided  Taken 6/27/2022 0000 by Miracle Eldridge, RN  Infection Prevention:    cohorting utilized    environmental surveillance performed    equipment surfaces disinfected    hand hygiene promoted    personal protective equipment utilized    rest/sleep promoted    single patient room provided  Taken 6/26/2022 2000 by Miracle Eldridge, RN  Infection Prevention:    cohorting utilized    environmental surveillance performed    equipment surfaces disinfected    hand hygiene promoted    personal protective equipment utilized    rest/sleep promoted    single patient room provided

## 2022-06-27 NOTE — PLAN OF CARE
Goal Outcome Evaluation:    Plan of Care Reviewed With: patient     Overall Patient Progress: improving    Outcome Evaluation: PO intake improving    Problem: Oral Intake Inadequate (Heart Failure)  Goal: Optimal Nutrition Intake  Outcome: Ongoing, Progressing

## 2022-06-28 ENCOUNTER — APPOINTMENT (OUTPATIENT)
Dept: OCCUPATIONAL THERAPY | Facility: CLINIC | Age: 62
DRG: 001 | End: 2022-06-28
Attending: INTERNAL MEDICINE
Payer: COMMERCIAL

## 2022-06-28 ENCOUNTER — APPOINTMENT (OUTPATIENT)
Dept: GENERAL RADIOLOGY | Facility: CLINIC | Age: 62
DRG: 001 | End: 2022-06-28
Attending: INTERNAL MEDICINE
Payer: COMMERCIAL

## 2022-06-28 LAB
ABO/RH(D): NORMAL
ALBUMIN SERPL BCG-MCNC: 2.8 G/DL (ref 3.5–5.2)
ALP SERPL-CCNC: 118 U/L (ref 40–129)
ALT SERPL W P-5'-P-CCNC: 96 U/L (ref 10–50)
ANION GAP SERPL CALCULATED.3IONS-SCNC: 10 MMOL/L (ref 7–15)
ANTIBODY SCREEN: NEGATIVE
AST SERPL W P-5'-P-CCNC: 34 U/L (ref 10–50)
BASE EXCESS BLDV CALC-SCNC: 0 MMOL/L (ref -7.7–1.9)
BILIRUB SERPL-MCNC: 0.3 MG/DL
BLD PROD TYP BPU: NORMAL
BLD PROD TYP BPU: NORMAL
BLOOD COMPONENT TYPE: NORMAL
BLOOD COMPONENT TYPE: NORMAL
BUN SERPL-MCNC: 9.4 MG/DL (ref 8–23)
CALCIUM SERPL-MCNC: 9.1 MG/DL (ref 8.8–10.2)
CHLORIDE SERPL-SCNC: 101 MMOL/L (ref 98–107)
CODING SYSTEM: NORMAL
CODING SYSTEM: NORMAL
CREAT SERPL-MCNC: 0.47 MG/DL (ref 0.67–1.17)
CROSSMATCH: NORMAL
CROSSMATCH: NORMAL
DEPRECATED HCO3 PLAS-SCNC: 21 MMOL/L (ref 22–29)
ERYTHROCYTE [DISTWIDTH] IN BLOOD BY AUTOMATED COUNT: 17 % (ref 10–15)
GFR SERPL CREATININE-BSD FRML MDRD: >90 ML/MIN/1.73M2
GLUCOSE BLDC GLUCOMTR-MCNC: 110 MG/DL (ref 70–99)
GLUCOSE BLDC GLUCOMTR-MCNC: 124 MG/DL (ref 70–99)
GLUCOSE BLDC GLUCOMTR-MCNC: 138 MG/DL (ref 70–99)
GLUCOSE SERPL-MCNC: 123 MG/DL (ref 70–99)
HCO3 BLDV-SCNC: 25 MMOL/L (ref 21–28)
HCT VFR BLD AUTO: 21.8 % (ref 40–53)
HGB BLD-MCNC: 6.3 G/DL (ref 13.3–17.7)
INR PPP: 1.13 (ref 0.85–1.15)
ISSUE DATE AND TIME: NORMAL
ISSUE DATE AND TIME: NORMAL
MAGNESIUM SERPL-MCNC: 2 MG/DL (ref 1.7–2.3)
MCH RBC QN AUTO: 28.6 PG (ref 26.5–33)
MCHC RBC AUTO-ENTMCNC: 28.9 G/DL (ref 31.5–36.5)
MCV RBC AUTO: 99 FL (ref 78–100)
O2/TOTAL GAS SETTING VFR VENT: 21 %
OXYHGB MFR BLDV: 53 % (ref 70–75)
PCO2 BLDV: 39 MM HG (ref 40–50)
PH BLDV: 7.41 [PH] (ref 7.32–7.43)
PHOSPHATE SERPL-MCNC: 4.6 MG/DL (ref 2.5–4.5)
PLATELET # BLD AUTO: 277 10E3/UL (ref 150–450)
PO2 BLDV: 32 MM HG (ref 25–47)
POTASSIUM SERPL-SCNC: 4.1 MMOL/L (ref 3.4–5.3)
PROT SERPL-MCNC: 5.8 G/DL (ref 6.4–8.3)
RBC # BLD AUTO: 2.2 10E6/UL (ref 4.4–5.9)
SODIUM SERPL-SCNC: 132 MMOL/L (ref 136–145)
SPECIMEN EXPIRATION DATE: NORMAL
UFH PPP CHRO-ACNC: 0.26 IU/ML
UNIT ABO/RH: NORMAL
UNIT ABO/RH: NORMAL
UNIT NUMBER: NORMAL
UNIT NUMBER: NORMAL
UNIT STATUS: NORMAL
UNIT STATUS: NORMAL
UNIT TYPE ISBT: 5100
UNIT TYPE ISBT: 5100
WBC # BLD AUTO: 6 10E3/UL (ref 4–11)

## 2022-06-28 PROCEDURE — 86832 HLA CLASS I HIGH DEFIN QUAL: CPT

## 2022-06-28 PROCEDURE — 86833 HLA CLASS II HIGH DEFIN QUAL: CPT

## 2022-06-28 PROCEDURE — 99291 CRITICAL CARE FIRST HOUR: CPT | Mod: GC | Performed by: INTERNAL MEDICINE

## 2022-06-28 PROCEDURE — 250N000013 HC RX MED GY IP 250 OP 250 PS 637: Performed by: INTERNAL MEDICINE

## 2022-06-28 PROCEDURE — 250N000011 HC RX IP 250 OP 636: Performed by: INTERNAL MEDICINE

## 2022-06-28 PROCEDURE — 86850 RBC ANTIBODY SCREEN: CPT | Performed by: INTERNAL MEDICINE

## 2022-06-28 PROCEDURE — 86923 COMPATIBILITY TEST ELECTRIC: CPT | Performed by: STUDENT IN AN ORGANIZED HEALTH CARE EDUCATION/TRAINING PROGRAM

## 2022-06-28 PROCEDURE — 86825 HLA X-MATH NON-CYTOTOXIC: CPT | Performed by: INTERNAL MEDICINE

## 2022-06-28 PROCEDURE — 999N000157 HC STATISTIC RCP TIME EA 10 MIN

## 2022-06-28 PROCEDURE — 85027 COMPLETE CBC AUTOMATED: CPT | Performed by: STUDENT IN AN ORGANIZED HEALTH CARE EDUCATION/TRAINING PROGRAM

## 2022-06-28 PROCEDURE — 85610 PROTHROMBIN TIME: CPT | Performed by: INTERNAL MEDICINE

## 2022-06-28 PROCEDURE — 85520 HEPARIN ASSAY: CPT | Performed by: INTERNAL MEDICINE

## 2022-06-28 PROCEDURE — 999N000155 HC STATISTIC RAPCV CVP MONITORING

## 2022-06-28 PROCEDURE — 83735 ASSAY OF MAGNESIUM: CPT | Performed by: INTERNAL MEDICINE

## 2022-06-28 PROCEDURE — 80053 COMPREHEN METABOLIC PANEL: CPT | Performed by: STUDENT IN AN ORGANIZED HEALTH CARE EDUCATION/TRAINING PROGRAM

## 2022-06-28 PROCEDURE — 250N000013 HC RX MED GY IP 250 OP 250 PS 637

## 2022-06-28 PROCEDURE — 999N000075 HC STATISTIC IABP MONITORING

## 2022-06-28 PROCEDURE — 71045 X-RAY EXAM CHEST 1 VIEW: CPT

## 2022-06-28 PROCEDURE — 71045 X-RAY EXAM CHEST 1 VIEW: CPT | Mod: 26 | Performed by: RADIOLOGY

## 2022-06-28 PROCEDURE — 97530 THERAPEUTIC ACTIVITIES: CPT | Mod: GO

## 2022-06-28 PROCEDURE — 200N000002 HC R&B ICU UMMC

## 2022-06-28 PROCEDURE — 86901 BLOOD TYPING SEROLOGIC RH(D): CPT | Performed by: INTERNAL MEDICINE

## 2022-06-28 PROCEDURE — 250N000011 HC RX IP 250 OP 636: Performed by: STUDENT IN AN ORGANIZED HEALTH CARE EDUCATION/TRAINING PROGRAM

## 2022-06-28 PROCEDURE — 84100 ASSAY OF PHOSPHORUS: CPT | Performed by: INTERNAL MEDICINE

## 2022-06-28 PROCEDURE — 250N000013 HC RX MED GY IP 250 OP 250 PS 637: Performed by: STUDENT IN AN ORGANIZED HEALTH CARE EDUCATION/TRAINING PROGRAM

## 2022-06-28 PROCEDURE — P9016 RBC LEUKOCYTES REDUCED: HCPCS | Performed by: STUDENT IN AN ORGANIZED HEALTH CARE EDUCATION/TRAINING PROGRAM

## 2022-06-28 PROCEDURE — 82805 BLOOD GASES W/O2 SATURATION: CPT | Performed by: STUDENT IN AN ORGANIZED HEALTH CARE EDUCATION/TRAINING PROGRAM

## 2022-06-28 RX ORDER — ACETAMINOPHEN 325 MG/1
975 TABLET ORAL 3 TIMES DAILY PRN
Status: DISCONTINUED | OUTPATIENT
Start: 2022-06-28 | End: 2022-07-08

## 2022-06-28 RX ORDER — MAGNESIUM SULFATE HEPTAHYDRATE 40 MG/ML
2 INJECTION, SOLUTION INTRAVENOUS ONCE
Status: COMPLETED | OUTPATIENT
Start: 2022-06-28 | End: 2022-06-28

## 2022-06-28 RX ADMIN — Medication 10 MG: at 20:37

## 2022-06-28 RX ADMIN — SALINE NASAL SPRAY 2 SPRAY: 1.5 SOLUTION NASAL at 07:46

## 2022-06-28 RX ADMIN — POLYETHYLENE GLYCOL 3350 17 G: 17 POWDER, FOR SOLUTION ORAL at 07:46

## 2022-06-28 RX ADMIN — SALINE NASAL SPRAY 2 SPRAY: 1.5 SOLUTION NASAL at 20:40

## 2022-06-28 RX ADMIN — HEPARIN SODIUM 1200 UNITS/HR: 10000 INJECTION, SOLUTION INTRAVENOUS at 14:52

## 2022-06-28 RX ADMIN — SALINE NASAL SPRAY 2 SPRAY: 1.5 SOLUTION NASAL at 14:53

## 2022-06-28 RX ADMIN — SENNOSIDES AND DOCUSATE SODIUM 3 TABLET: 8.6; 5 TABLET ORAL at 07:46

## 2022-06-28 RX ADMIN — SENNOSIDES AND DOCUSATE SODIUM 1 TABLET: 8.6; 5 TABLET ORAL at 20:38

## 2022-06-28 RX ADMIN — ACETAMINOPHEN 975 MG: 325 TABLET, FILM COATED ORAL at 18:13

## 2022-06-28 RX ADMIN — TRAZODONE HYDROCHLORIDE 100 MG: 50 TABLET ORAL at 20:37

## 2022-06-28 RX ADMIN — HYDROXYCHLOROQUINE SULFATE 200 MG: 200 TABLET, FILM COATED ORAL at 07:45

## 2022-06-28 RX ADMIN — DULOXETINE 30 MG: 30 CAPSULE, DELAYED RELEASE ORAL at 07:42

## 2022-06-28 RX ADMIN — HYDROXYCHLOROQUINE SULFATE 200 MG: 200 TABLET, FILM COATED ORAL at 20:37

## 2022-06-28 RX ADMIN — OXYCODONE HYDROCHLORIDE 5 MG: 5 TABLET ORAL at 20:37

## 2022-06-28 RX ADMIN — TAMSULOSIN HYDROCHLORIDE 0.4 MG: 0.4 CAPSULE ORAL at 07:42

## 2022-06-28 RX ADMIN — ONDANSETRON 4 MG: 2 INJECTION INTRAMUSCULAR; INTRAVENOUS at 18:39

## 2022-06-28 RX ADMIN — SALINE NASAL SPRAY 2 SPRAY: 1.5 SOLUTION NASAL at 18:02

## 2022-06-28 RX ADMIN — MAGNESIUM SULFATE HEPTAHYDRATE 2 G: 40 INJECTION, SOLUTION INTRAVENOUS at 05:13

## 2022-06-28 RX ADMIN — ACETAMINOPHEN 975 MG: 325 TABLET, FILM COATED ORAL at 04:20

## 2022-06-28 RX ADMIN — ASPIRIN 81 MG CHEWABLE TABLET 81 MG: 81 TABLET CHEWABLE at 07:42

## 2022-06-28 RX ADMIN — THIAMINE HCL TAB 100 MG 100 MG: 100 TAB at 07:42

## 2022-06-28 ASSESSMENT — ACTIVITIES OF DAILY LIVING (ADL)
ADLS_ACUITY_SCORE: 28

## 2022-06-28 NOTE — PROGRESS NOTES
Cardiology Progress Note       Changes Today:  - remain stable with dobutamine 2.5, IABP 1:1  -Resending PRA from today prior to transfusion then will transfuse 2 units of pRBCs  - appears euvolemic today, defer fluid/diuretics  - s/p subclavian IABP 6/23/22              - IABP MAP not accurate, currently base his blood pressure on his cuff pressure  - promote ambulation + continue caloric count  - remains status 2  - plan for in and out [facility] 7/1/22, needs renewal of status 2 on 7/4/22    Physical Exam   Temp: 97.8  F (36.6  C) Temp src: Oral BP: 118/55 Pulse: 87   Resp: 23 SpO2: 98 % O2 Device: None (Room air)      Vital Signs with Ranges  Temp:  [97.8  F (36.6  C)-98.4  F (36.9  C)] 97.8  F (36.6  C)  Pulse:  [] 87  Resp:  [13-33] 23  BP: ()/(52-86) 118/55  SpO2:  [90 %-98 %] 98 %     DATE MAP CVP PAP PCWP Marley CO Marley CI SVR PVR Therapies   6/24/22  10 AM 73 8 36/18 18 5.5 3.1 1163 1.1 - cont IABP 1:1  - cont dobu 2.5  - off nipride  - s/p bumex 2 mg IV*1      6/25/22 72 8 39/21 11 4.9 2.8 1050 3.2 - continue IABP  - continue dobu  - hold off diuresis    6/26/22  (swan pullled) 72 4 40/21 12 4.9 2.8 1110 3 - continue IABP and dobu  - defer IVF despite low CVP   6/27/22 73 10 - - 6.7 3.8 - - - continue IABP and dobu                                                                            IABP: via R Subclavian   Triggered by EKG  Augmented MAPs      Intake/Output    Intake/Output Summary (Last 24 hours) at 6/28/2022 0739  +2748  -2265    +499  -1200    -701      Weight  Vitals:    06/24/22 0400 06/25/22 0400 06/26/22 1000 06/27/22 0000   Weight: 67.6 kg (149 lb 0.5 oz) 64.8 kg (142 lb 13.7 oz) 65 kg (143 lb 6.4 oz) 64.8 kg (142 lb 12.8 oz)    06/27/22 0839   Weight: 66.1 kg (145 lb 11.2 oz)       Resp: 23       DOBUTamine 2.5 mcg/kg/min (06/28/22 0700)    heparin 1,200 Units/hr (06/28/22 0700)        aspirin  81 mg Oral Daily    DULoxetine  30 mg Oral Daily    fluticasone-vilanterol  1 puff  "Inhalation Daily    hydroxychloroquine  200 mg Oral BID    iron sucrose (VENOFER) intermittent infusion  300 mg Intravenous Q72H    lidocaine  2 patch Transdermal Q24h    And    lidocaine   Transdermal Q8H CAMMY    melatonin  10 mg Oral At Bedtime    polyethylene glycol  17 g Oral TID    senna-docusate  3 tablet Oral BID    sodium chloride  2 spray Both Nostrils Q3H    tamsulosin  0.4 mg Oral Daily    thiamine  100 mg Oral Daily    traZODone  100 mg Oral At Bedtime        , Blood pressure 118/55, pulse 87, temperature 97.8  F (36.6  C), temperature source Oral, resp. rate 23, height 1.702 m (5' 7\"), weight 66.1 kg (145 lb 11.2 oz), SpO2 98 %.  GEN:  Alert, oriented x 3, appears comfortable, NAD.  CV:  Regular rate and rhythm  LUNGS:  Clear to auscultation bilaterally   ABD:  Active bowel sounds, soft, non-tender/non-distended.  No rebound/guarding/rigidity.  EXT:  No edema or cyanosis.      Data   Recent Labs   Lab Test 06/28/22 0407 06/28/22  0402 06/27/22  1604 06/27/22  0355   *  --   --  130*   POTASSIUM 4.1  --   --  4.0   CHLORIDE 101  --   --  101   CO2 21*  --   --  19*   ANIONGAP 10  --   --  10   * 124*   < > 106*   BUN 9.4  --   --  9.4   CR 0.47*  --   --  0.56*   ELDA 9.1  --   --  8.8    < > = values in this interval not displayed.       Lab Results   Component Value Date    WBC 6.0 06/28/2022     Lab Results   Component Value Date    RBC 2.20 06/28/2022     Lab Results   Component Value Date    HGB 6.3 06/28/2022     Lab Results   Component Value Date    HCT 21.8 06/28/2022     No components found for: MCT  Lab Results   Component Value Date    MCV 99 06/28/2022     Lab Results   Component Value Date    MCH 28.6 06/28/2022     Lab Results   Component Value Date    MCHC 28.9 06/28/2022     Lab Results   Component Value Date    RDW 17.0 06/28/2022     Lab Results   Component Value Date     06/28/2022        Liver Function Studies -   Recent Labs   Lab Test 06/28/22 0407   PROTTOTAL " 5.8*   ALBUMIN 2.8*   BILITOTAL 0.3   ALKPHOS 118   AST 34   ALT 96*       Venous Blood Gas  Recent Labs   Lab 06/28/22  0407 06/27/22  0355 06/26/22  1002 06/26/22  0822   PHV 7.41 7.42 7.42 7.44*   PCO2V 39* 39* 39* 35*   PO2V 32 34 30 28   HCO3V 25 25 25 23   RINA 0.0 0.3 0.3 -0.8   O2PER 21 21 21 21       Arterial Blood Gas  Recent Labs   Lab 06/28/22  0407 06/27/22  0355 06/26/22  1002 06/26/22  0822   O2PER 21 21 21 21          Recent Results (from the past 24 hour(s))   XR Chest Port 1 View    Narrative    Portable chest    INDICATION: Intra-aortic balloon pump position    COMPARISON: 6/26/2022    FINDINGS: Intra-aortic balloon pump marker again is at the mid aortic  knob. Heart size normal. Implantable cardiac defibrillator. Possible  right IJ catheter in the right atrium. Left costophrenic angle  haziness.      Impression    IMPRESSION: Intra-aortic balloon pump marker at the distal aortic  arch. Small left pleural effusion. Implantable cardiac defibrillator.    CONSTANTINO OWEN MD         SYSTEM ID:  [id number]       Blood culture:  No results found for this or any previous visit.   Urine culture:  Results for orders placed or performed during the hospital encounter of 05/16/22   Urine Culture Aerobic Bacterial    Specimen: Urine, Midstream   Result Value Ref Range    Culture No Growth        Assessment & Plan     61-year-old man with history of ischemic cardiomyopathy LVEF 15%) NYHA class IV symptoms ACC stage D, multiple admissions with heart failure over the past several months. Presented with cardiogenic shock c/b multi-organ failure (JOSE; Acute liver injury/shock liver) requiring mechanical hemodynamic support. Now listed as status 2 for OHT. Course c/b large epistaxis 6/22 with 400 ml blood. Had subclavian IABP placed instead of femoral IABP 6/23/22. Hemoglobin gradually downtrending without e/o further e/o bleeding. Continue to optimize his hemodynamics while awaiting for heart transplant.         ===============  CARDIOVASCULAR  ===============  # Ambulatory cardiogenic shock SCAI D  # Advanced ischemic cardiomyopathy, Class IV, Stage D  # s/p CRT-D ([employer] 2006, gen change 2012)  # SVT  # Nonsustained VT  # CAD s/p PCI to LAD (2005)  # Hx of MI (2007)  # Severe ostial LAD disease with in-stent restenosis of mid LAD at diag bifurcation, severe proximal RCA disease, mild circ disease now s/p ostial LAD and proximal RCA lithotripsy and stenting on 5/24/22  CMRI showing infarcted myocardium in LAD territory. Given the degree of LV dysfunction/dilation, moderate to severe functional MR and lack of viable myocardium, cardiac surgery would be high risk. Now s/p ostial LAD and proximal RCA lithotripsy and stenting on 5/24/22.      Plan:  He will remain on 1:1 IABP with dobutamine gtt 2.5 to maintain his hemodynamics + hemodynamic-guided diuresis & afterload reduction. Monitor hemodynamic q6h.    - hemodynamic support:              - Continue iABP 1:1                          - IABP MAP not accurate, will use cuff pressure for his blood pressure             - Inotropes:                            - continue dobutamine 2.5; previously weaned off 6/22/22, restarted ~ 12 PM 6/23/22  - Afterload reduction:             - afterload reduction effect from dobutamine             - continue to hold hydralazine 50 mg, isordil 20 mg              - off nitroprusside since 6/24 in the AM  - Fluid status:              - appears euvolemic today from exam findings: JVP ~ 7- 8 from physical exam             - defer IVF and diuretics             - Goal CVP 8-10  - Anticoagulation:              - warfarin on hold             - low intensity heparin gtt continued for h/o DVT/PE                         - restarted 6PM 6/23 after subclavian IABP placement  - Antiplatelet: Continue ASA-81mg   - Statin: hold statin   - BMP BID, K>4 & Mg>2  - Strict I/Os: Goal even to positive for low CVP  - Daily weight     ===========    PULMONARY  ===========  # Mild-Moderate Emphysema  # Hx of COVID-19  Former smoker 2 ppd for 40 years quit on 09/09/09.  - Appreciate pulmonary consultation given emphysema  - ILD panel:                           - AAT 1 level: in process                          - autoimmune work- up:                                      - LASHANDA positive                                      - Anti Helen-1 IgG negative                                      - SSA RO, SSB LA Ab negative                                      - Scleroderma ab scl 70 negative                                      - RF negative                                      - anti CCP 1.7                                      - ANCA IgG < 1:10                                      - Aldolase: 19.6(elevated)              - no need for PFTs with DLCO                                     - if symptoms are considered related to pulm findings: inhaler therapy with LABA/LAMA combination              - will need outpatient pulmonary follow up with seiral PFT hs- CT scan with inspiration and expiration views     =====  Renal  =====  -Strict I&O while diuresing   -Monitor Electrolytes while actively diuresing K->4 Mg->2     ===================   GASTROENTEROLOGY  ===================  # Severe malnutrition in the context of chronic illness  # Liver injury, in the setting of cardiogenic shock  # GERD  - monitor  - Diuresis as above  - Supplements to help maintain nutrition.  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  - continue low intensity heparin gtt     # Anemia, acute on chronic  # Iron deficiency  Baseline hgb 8-9. Started  to downtrend since nose bleed 6/22. Gradually downtrending without signs of new or ongoing bleeding. Found to have iron deficiency.   - defer transfusion at this time; goal Hgb 6.5              - transfuse 2 units if hgb < 6.5, nonirradiated  - continue iron IV *3 doses  - continue pediatric tubes for labs, limited blood draw  - monitor for signs of bleeding     # Profuse epistaxis from the left nare with bloody emesis  ~ 400 ml of bloody emesis in the blue bag on arrival. Also had one severe epistaxis that occurred at [place] requiring cauterization.  - Left nasal cavity packed with all absorbable packing - surgifoam + surgicel + surgiflo. No antibiotics are indicated due to absorbable packing.  - saline nasal pray q3h  - if begins to  in quantity of bleeding would recommend spraying the nasal cavity copiously with afrin and holding pressure over the soft part of the nose for 20 minutes continuously. Nasal clips are ineffective and should not be used in place of digital pressure. If bleeding continues despite these measures, repeat. If bleeding continues or is severe please contact ENT.     ==   ID  ==  ROHIT     ===============  RHEUMATOLOGY  ===============  #Lupus  # APL  - Continue pta hydroxychloroquine 200mg bid   - PTA mycophenolic acid 1500mg q12h on hold in anticipation for LVAD  -- Would plan to hold MMF one week prior to surgery and re-starting 5-7 days after LVAD placement in the absence of surgical site infection, poor wound healing or infection elsewhere.     ===    ===  - Continue pta tamsulosin 0.4mg daily     Diet: Low salt diet <2 g/24 hours  DVT Prophylaxis: Heparin GTT  Fitzgerald Catheter: In place  Code Status: Full   Lines: PICC Line      This patient was seen and discussed with Dr. Mendez who agrees with the above assessment and plan.     Agapito Alexander MD  Cardiology Fellow  [phone]

## 2022-06-28 NOTE — PLAN OF CARE
Major Shift Events: Neuro intact. Afebrile, SR-ST 80s-100s, MAPs maintained in goal, IABP 1:1 with 100% augmentation, CVP 10, Heparin at 1200. VSS on RA, good appetite with 2 gram Na/ 2L fluid restriction diet, adequate UOP, two large stools. 2 units PRBCs given for low Hgb- pt slightly disappointed due to the possibility of getting an LVAD instead. Ambulated in hallway twice and up to chair for most of the day. Pt requesting to try to go outside in the next few days if possible.     Plan: Status 2 on Heart Transplant list. Update primary team with changes in POC.    For vital signs and complete assessments, please see flowsheets.

## 2022-06-28 NOTE — PROGRESS NOTES
Care Management Follow Up    Length of Stay (days): 11    Expected Discharge Date:  TBD         Patient plan of care discussed at interdisciplinary rounds: Yes    Anticipated Discharge Disposition:  TBD     Anticipated Discharge Services:  TBD  Anticipated Discharge DME:  TBD    Additional Information:  Pt remain in ICU with subclavian IABP waiting for heart transplant as status 2.  [facility] will cont to follow plan of care.      Opal Wilhelm RN, PHN, BSN  4A and 4E/ ICU  Care Coordinator  Phone: [phone]  Pager: [phone]    To contact the weekend [facility]  [place] (0800 - 1630) Saturday and Sunday    Units: 4A, 4C, 4E, 5A and 5B- Pager 1: [phone]    Units: 6A, 6B, 6C, 6D- Pager 2: [phone]    Units: 7A, 7B, 7C, 7D, and 5C-Pager 3: [phone]

## 2022-06-28 NOTE — PLAN OF CARE
Pertinent PMH: Dandy is being followed by [facility] 2 who was admitted on 06/17 for OHT/advanced therapies w/o s/p femoral IABP, changed to subclavian IABP on 06/23.  Major Shift Events: A&OX4, neuro intact, follows commands, afebrile. Complains of incisional pain during dressing changes s/p 5mg oxycodone for pain. Able to maintain SpO2 > 94% on RA, LS dim in LL. R. Subclavian IABP 1:1, 100% augmentation, MAPs 50 - 60's, although using cuff pressure per cardiology orders. Cuff pressure MAPs 70 - 80's, SR/ST on tele. Dobutamine at 2.5 mcg/kg, heparin gtt at 1200 units/Hr, last 10A was therapeutic. CVP 13 - 16. HBG 6.3, per cardiology fellow, do not refused PRBC until HBG < 6.0, though written orders are for HBG < 6.5, cards fellow aware. Pt is able to void spontaneously in bedside urinal, adequate UOP. BM during shift prior. IABP dressing changed by writer, writer notified cards 2 fellow of Stat lock peeling off and need for it to be changed. Lytes replaced as needed.  Plan: OHT w/o possible need for swan on 07/01. Continue to monitor.    For complete vital signs, hemodynamics, medications/drips, and complete assessments please see documentation flowsheets.      Goal Outcome Evaluation:    Plan of Care Reviewed With: patient, daughter, caregiver     Overall Patient Progress: no change

## 2022-06-28 NOTE — PROGRESS NOTES
Calorie Count  Intake recorded for: 6/27  Total Kcals: 1435 Total Protein: 58g  Kcals from Hospital Food: 1435   Protein: 58g  Kcals from Outside Food (average):0 Protein: 0g  # Meals Ordered from Kitchen: 3 meals  # Meals Recorded: 3 meals (First - 50% egg garcia and cheese breakfast sandwich)      (Second - 100% chili, potato chips, damion mist)      (Third - 100% cheese quesadilla, potato chips)  # Supplements Recorded: 100% 1 ensure enlive

## 2022-06-29 ENCOUNTER — APPOINTMENT (OUTPATIENT)
Dept: GENERAL RADIOLOGY | Facility: CLINIC | Age: 62
DRG: 001 | End: 2022-06-29
Attending: INTERNAL MEDICINE
Payer: COMMERCIAL

## 2022-06-29 LAB
A FUMIGATUS1 AB SER QL ID: NORMAL
A FUMIGATUS6 AB SER QL ID: NORMAL
A PULLULANS AB SER QL ID: NORMAL
ALBUMIN SERPL BCG-MCNC: 3 G/DL (ref 3.5–5.2)
ALP SERPL-CCNC: 132 U/L (ref 40–129)
ALT SERPL W P-5'-P-CCNC: 87 U/L (ref 10–50)
ANION GAP SERPL CALCULATED.3IONS-SCNC: 9 MMOL/L (ref 7–15)
AST SERPL W P-5'-P-CCNC: 30 U/L (ref 10–50)
BASE EXCESS BLDV CALC-SCNC: -0.5 MMOL/L (ref -7.7–1.9)
BILIRUB SERPL-MCNC: 0.4 MG/DL
BUN SERPL-MCNC: 9.7 MG/DL (ref 8–23)
CALCIUM SERPL-MCNC: 9.2 MG/DL (ref 8.8–10.2)
CHLORIDE SERPL-SCNC: 100 MMOL/L (ref 98–107)
CREAT SERPL-MCNC: 0.63 MG/DL (ref 0.67–1.17)
DEPRECATED HCO3 PLAS-SCNC: 21 MMOL/L (ref 22–29)
ERYTHROCYTE [DISTWIDTH] IN BLOOD BY AUTOMATED COUNT: 18.1 % (ref 10–15)
GFR SERPL CREATININE-BSD FRML MDRD: >90 ML/MIN/1.73M2
GLUCOSE BLDC GLUCOMTR-MCNC: 88 MG/DL (ref 70–99)
GLUCOSE SERPL-MCNC: 89 MG/DL (ref 70–99)
HCO3 BLDV-SCNC: 25 MMOL/L (ref 21–28)
HCT VFR BLD AUTO: 27.4 % (ref 40–53)
HGB BLD-MCNC: 8.4 G/DL (ref 13.3–17.7)
INR PPP: 1.1 (ref 0.85–1.15)
MAGNESIUM SERPL-MCNC: 1.9 MG/DL (ref 1.7–2.3)
MAGNESIUM SERPL-MCNC: 1.9 MG/DL (ref 1.7–2.3)
MCH RBC QN AUTO: 28.5 PG (ref 26.5–33)
MCHC RBC AUTO-ENTMCNC: 30.7 G/DL (ref 31.5–36.5)
MCV RBC AUTO: 93 FL (ref 78–100)
MDC_IDC_EPISODE_DTM: NORMAL
MDC_IDC_EPISODE_DURATION: 1 S
MDC_IDC_EPISODE_ID: 25
MDC_IDC_EPISODE_TYPE: NORMAL
MDC_IDC_LEAD_IMPLANT_DT: NORMAL
MDC_IDC_LEAD_IMPLANT_DT: NORMAL
MDC_IDC_LEAD_LOCATION: NORMAL
MDC_IDC_LEAD_LOCATION: NORMAL
MDC_IDC_LEAD_LOCATION_DETAIL_1: NORMAL
MDC_IDC_LEAD_LOCATION_DETAIL_1: NORMAL
MDC_IDC_LEAD_MFG: NORMAL
MDC_IDC_LEAD_MFG: NORMAL
MDC_IDC_LEAD_MODEL: NORMAL
MDC_IDC_LEAD_MODEL: NORMAL
MDC_IDC_LEAD_POLARITY_TYPE: NORMAL
MDC_IDC_LEAD_POLARITY_TYPE: NORMAL
MDC_IDC_LEAD_SERIAL: NORMAL
MDC_IDC_LEAD_SERIAL: NORMAL
MDC_IDC_MSMT_BATTERY_DTM: NORMAL
MDC_IDC_MSMT_BATTERY_REMAINING_LONGEVITY: 65 MO
MDC_IDC_MSMT_BATTERY_RRT_TRIGGER: 2.73
MDC_IDC_MSMT_BATTERY_STATUS: NORMAL
MDC_IDC_MSMT_BATTERY_VOLTAGE: 2.98 V
MDC_IDC_MSMT_CAP_CHARGE_DTM: NORMAL
MDC_IDC_MSMT_CAP_CHARGE_ENERGY: 18 J
MDC_IDC_MSMT_CAP_CHARGE_TIME: 3.97
MDC_IDC_MSMT_CAP_CHARGE_TYPE: NORMAL
MDC_IDC_MSMT_LEADCHNL_RA_IMPEDANCE_VALUE: 342 OHM
MDC_IDC_MSMT_LEADCHNL_RA_PACING_THRESHOLD_AMPLITUDE: 0.5 V
MDC_IDC_MSMT_LEADCHNL_RA_PACING_THRESHOLD_PULSEWIDTH: 0.4 MS
MDC_IDC_MSMT_LEADCHNL_RA_SENSING_INTR_AMPL: 0.9 MV
MDC_IDC_MSMT_LEADCHNL_RV_IMPEDANCE_VALUE: 304 OHM
MDC_IDC_MSMT_LEADCHNL_RV_IMPEDANCE_VALUE: 399 OHM
MDC_IDC_MSMT_LEADCHNL_RV_PACING_THRESHOLD_AMPLITUDE: 1 V
MDC_IDC_MSMT_LEADCHNL_RV_PACING_THRESHOLD_PULSEWIDTH: 0.4 MS
MDC_IDC_MSMT_LEADCHNL_RV_SENSING_INTR_AMPL: 11.3 MV
MDC_IDC_PG_IMPLANT_DTM: NORMAL
MDC_IDC_PG_MFG: NORMAL
MDC_IDC_PG_MODEL: NORMAL
MDC_IDC_PG_SERIAL: NORMAL
MDC_IDC_PG_TYPE: NORMAL
MDC_IDC_SESS_CLINIC_NAME: NORMAL
MDC_IDC_SESS_DTM: NORMAL
MDC_IDC_SESS_TYPE: NORMAL
MDC_IDC_SET_BRADY_AT_MODE_SWITCH_RATE: 171 {BEATS}/MIN
MDC_IDC_SET_BRADY_HYSTRATE: NORMAL
MDC_IDC_SET_BRADY_LOWRATE: 50 {BEATS}/MIN
MDC_IDC_SET_BRADY_MAX_SENSOR_RATE: 115 {BEATS}/MIN
MDC_IDC_SET_BRADY_MAX_TRACKING_RATE: 130 {BEATS}/MIN
MDC_IDC_SET_BRADY_MODE: NORMAL
MDC_IDC_SET_BRADY_PAV_DELAY_LOW: 350 MS
MDC_IDC_SET_BRADY_SAV_DELAY_LOW: 350 MS
MDC_IDC_SET_LEADCHNL_RA_PACING_AMPLITUDE: 1.5 V
MDC_IDC_SET_LEADCHNL_RA_PACING_ANODE_ELECTRODE_1: NORMAL
MDC_IDC_SET_LEADCHNL_RA_PACING_ANODE_LOCATION_1: NORMAL
MDC_IDC_SET_LEADCHNL_RA_PACING_CAPTURE_MODE: NORMAL
MDC_IDC_SET_LEADCHNL_RA_PACING_CATHODE_ELECTRODE_1: NORMAL
MDC_IDC_SET_LEADCHNL_RA_PACING_CATHODE_LOCATION_1: NORMAL
MDC_IDC_SET_LEADCHNL_RA_PACING_POLARITY: NORMAL
MDC_IDC_SET_LEADCHNL_RA_PACING_PULSEWIDTH: 0.4 MS
MDC_IDC_SET_LEADCHNL_RA_SENSING_ANODE_ELECTRODE_1: NORMAL
MDC_IDC_SET_LEADCHNL_RA_SENSING_ANODE_LOCATION_1: NORMAL
MDC_IDC_SET_LEADCHNL_RA_SENSING_CATHODE_ELECTRODE_1: NORMAL
MDC_IDC_SET_LEADCHNL_RA_SENSING_CATHODE_LOCATION_1: NORMAL
MDC_IDC_SET_LEADCHNL_RA_SENSING_POLARITY: NORMAL
MDC_IDC_SET_LEADCHNL_RA_SENSING_SENSITIVITY: 0.3 MV
MDC_IDC_SET_LEADCHNL_RV_PACING_AMPLITUDE: 2.25 V
MDC_IDC_SET_LEADCHNL_RV_PACING_ANODE_ELECTRODE_1: NORMAL
MDC_IDC_SET_LEADCHNL_RV_PACING_ANODE_LOCATION_1: NORMAL
MDC_IDC_SET_LEADCHNL_RV_PACING_CAPTURE_MODE: NORMAL
MDC_IDC_SET_LEADCHNL_RV_PACING_CATHODE_ELECTRODE_1: NORMAL
MDC_IDC_SET_LEADCHNL_RV_PACING_CATHODE_LOCATION_1: NORMAL
MDC_IDC_SET_LEADCHNL_RV_PACING_POLARITY: NORMAL
MDC_IDC_SET_LEADCHNL_RV_PACING_PULSEWIDTH: 0.4 MS
MDC_IDC_SET_LEADCHNL_RV_SENSING_ANODE_ELECTRODE_1: NORMAL
MDC_IDC_SET_LEADCHNL_RV_SENSING_ANODE_LOCATION_1: NORMAL
MDC_IDC_SET_LEADCHNL_RV_SENSING_CATHODE_ELECTRODE_1: NORMAL
MDC_IDC_SET_LEADCHNL_RV_SENSING_CATHODE_LOCATION_1: NORMAL
MDC_IDC_SET_LEADCHNL_RV_SENSING_POLARITY: NORMAL
MDC_IDC_SET_LEADCHNL_RV_SENSING_SENSITIVITY: 0.3 MV
MDC_IDC_SET_ZONE_DETECTION_BEATS_DENOMINATOR: 40 {BEATS}
MDC_IDC_SET_ZONE_DETECTION_BEATS_NUMERATOR: 30 {BEATS}
MDC_IDC_SET_ZONE_DETECTION_INTERVAL: 280 MS
MDC_IDC_SET_ZONE_DETECTION_INTERVAL: 320 MS
MDC_IDC_SET_ZONE_DETECTION_INTERVAL: 350 MS
MDC_IDC_SET_ZONE_DETECTION_INTERVAL: 350 MS
MDC_IDC_SET_ZONE_DETECTION_INTERVAL: 400 MS
MDC_IDC_SET_ZONE_TYPE: NORMAL
MDC_IDC_STAT_AT_BURDEN_PERCENT: 0 %
MDC_IDC_STAT_AT_DTM_END: NORMAL
MDC_IDC_STAT_AT_DTM_START: NORMAL
MDC_IDC_STAT_BRADY_AP_VP_PERCENT: 0 %
MDC_IDC_STAT_BRADY_AP_VS_PERCENT: 0.03 %
MDC_IDC_STAT_BRADY_AS_VP_PERCENT: 0.06 %
MDC_IDC_STAT_BRADY_AS_VS_PERCENT: 99.91 %
MDC_IDC_STAT_BRADY_DTM_END: NORMAL
MDC_IDC_STAT_BRADY_DTM_START: NORMAL
MDC_IDC_STAT_BRADY_RA_PERCENT_PACED: 0.03 %
MDC_IDC_STAT_BRADY_RV_PERCENT_PACED: 0.07 %
MDC_IDC_STAT_EPISODE_RECENT_COUNT: 0
MDC_IDC_STAT_EPISODE_RECENT_COUNT: 1
MDC_IDC_STAT_EPISODE_RECENT_COUNT_DTM_END: NORMAL
MDC_IDC_STAT_EPISODE_RECENT_COUNT_DTM_START: NORMAL
MDC_IDC_STAT_EPISODE_TOTAL_COUNT: 0
MDC_IDC_STAT_EPISODE_TOTAL_COUNT: 1
MDC_IDC_STAT_EPISODE_TOTAL_COUNT: 1
MDC_IDC_STAT_EPISODE_TOTAL_COUNT: 2
MDC_IDC_STAT_EPISODE_TOTAL_COUNT: 9
MDC_IDC_STAT_EPISODE_TOTAL_COUNT_DTM_END: NORMAL
MDC_IDC_STAT_EPISODE_TOTAL_COUNT_DTM_START: NORMAL
MDC_IDC_STAT_EPISODE_TYPE: NORMAL
MDC_IDC_STAT_TACHYTHERAPY_ATP_DELIVERED_RECENT: 0
MDC_IDC_STAT_TACHYTHERAPY_ATP_DELIVERED_TOTAL: 0
MDC_IDC_STAT_TACHYTHERAPY_RECENT_DTM_END: NORMAL
MDC_IDC_STAT_TACHYTHERAPY_RECENT_DTM_START: NORMAL
MDC_IDC_STAT_TACHYTHERAPY_SHOCKS_ABORTED_RECENT: 0
MDC_IDC_STAT_TACHYTHERAPY_SHOCKS_ABORTED_TOTAL: 0
MDC_IDC_STAT_TACHYTHERAPY_SHOCKS_DELIVERED_RECENT: 0
MDC_IDC_STAT_TACHYTHERAPY_SHOCKS_DELIVERED_TOTAL: 1
MDC_IDC_STAT_TACHYTHERAPY_TOTAL_DTM_END: NORMAL
MDC_IDC_STAT_TACHYTHERAPY_TOTAL_DTM_START: NORMAL
O2/TOTAL GAS SETTING VFR VENT: 21 %
OXYHGB MFR BLDV: 51 % (ref 70–75)
PCO2 BLDV: 42 MM HG (ref 40–50)
PH BLDV: 7.38 [PH] (ref 7.32–7.43)
PHOSPHATE SERPL-MCNC: 5.4 MG/DL (ref 2.5–4.5)
PIGEON SERUM AB QL ID: NORMAL
PLATELET # BLD AUTO: 305 10E3/UL (ref 150–450)
PO2 BLDV: 31 MM HG (ref 25–47)
POTASSIUM SERPL-SCNC: 4 MMOL/L (ref 3.4–5.3)
POTASSIUM SERPL-SCNC: 4.3 MMOL/L (ref 3.4–5.3)
PROT SERPL-MCNC: 6.1 G/DL (ref 6.4–8.3)
RBC # BLD AUTO: 2.95 10E6/UL (ref 4.4–5.9)
S RECTIVIRGULA AB SER QL ID: NORMAL
SODIUM SERPL-SCNC: 130 MMOL/L (ref 136–145)
T VULGARIS1 AB SER QL ID: NORMAL
UFH PPP CHRO-ACNC: 0.29 IU/ML
WBC # BLD AUTO: 7.1 10E3/UL (ref 4–11)

## 2022-06-29 PROCEDURE — 250N000011 HC RX IP 250 OP 636: Performed by: INTERNAL MEDICINE

## 2022-06-29 PROCEDURE — 250N000013 HC RX MED GY IP 250 OP 250 PS 637

## 2022-06-29 PROCEDURE — 250N000011 HC RX IP 250 OP 636: Performed by: STUDENT IN AN ORGANIZED HEALTH CARE EDUCATION/TRAINING PROGRAM

## 2022-06-29 PROCEDURE — 83735 ASSAY OF MAGNESIUM: CPT | Performed by: INTERNAL MEDICINE

## 2022-06-29 PROCEDURE — 71045 X-RAY EXAM CHEST 1 VIEW: CPT | Mod: 76

## 2022-06-29 PROCEDURE — 84100 ASSAY OF PHOSPHORUS: CPT | Performed by: INTERNAL MEDICINE

## 2022-06-29 PROCEDURE — 250N000013 HC RX MED GY IP 250 OP 250 PS 637: Performed by: INTERNAL MEDICINE

## 2022-06-29 PROCEDURE — 71045 X-RAY EXAM CHEST 1 VIEW: CPT | Mod: 26 | Performed by: RADIOLOGY

## 2022-06-29 PROCEDURE — 85610 PROTHROMBIN TIME: CPT | Performed by: INTERNAL MEDICINE

## 2022-06-29 PROCEDURE — 85520 HEPARIN ASSAY: CPT | Performed by: INTERNAL MEDICINE

## 2022-06-29 PROCEDURE — 84132 ASSAY OF SERUM POTASSIUM: CPT | Performed by: INTERNAL MEDICINE

## 2022-06-29 PROCEDURE — 82805 BLOOD GASES W/O2 SATURATION: CPT | Performed by: STUDENT IN AN ORGANIZED HEALTH CARE EDUCATION/TRAINING PROGRAM

## 2022-06-29 PROCEDURE — 999N000075 HC STATISTIC IABP MONITORING

## 2022-06-29 PROCEDURE — 250N000013 HC RX MED GY IP 250 OP 250 PS 637: Performed by: STUDENT IN AN ORGANIZED HEALTH CARE EDUCATION/TRAINING PROGRAM

## 2022-06-29 PROCEDURE — 85027 COMPLETE CBC AUTOMATED: CPT | Performed by: STUDENT IN AN ORGANIZED HEALTH CARE EDUCATION/TRAINING PROGRAM

## 2022-06-29 PROCEDURE — 80053 COMPREHEN METABOLIC PANEL: CPT | Performed by: STUDENT IN AN ORGANIZED HEALTH CARE EDUCATION/TRAINING PROGRAM

## 2022-06-29 PROCEDURE — 250N000011 HC RX IP 250 OP 636

## 2022-06-29 PROCEDURE — 71045 X-RAY EXAM CHEST 1 VIEW: CPT

## 2022-06-29 PROCEDURE — 258N000003 HC RX IP 258 OP 636: Performed by: STUDENT IN AN ORGANIZED HEALTH CARE EDUCATION/TRAINING PROGRAM

## 2022-06-29 PROCEDURE — 99291 CRITICAL CARE FIRST HOUR: CPT | Mod: GC | Performed by: INTERNAL MEDICINE

## 2022-06-29 PROCEDURE — 200N000002 HC R&B ICU UMMC

## 2022-06-29 PROCEDURE — 86825 HLA X-MATH NON-CYTOTOXIC: CPT | Performed by: INTERNAL MEDICINE

## 2022-06-29 RX ORDER — LIDOCAINE 4 G/G
2 PATCH TOPICAL
Status: DISCONTINUED | OUTPATIENT
Start: 2022-06-29 | End: 2022-07-08

## 2022-06-29 RX ORDER — AMOXICILLIN 250 MG
2 CAPSULE ORAL 2 TIMES DAILY
Status: DISCONTINUED | OUTPATIENT
Start: 2022-06-29 | End: 2022-07-08

## 2022-06-29 RX ORDER — BUMETANIDE 0.25 MG/ML
2 INJECTION INTRAMUSCULAR; INTRAVENOUS ONCE
Status: COMPLETED | OUTPATIENT
Start: 2022-06-29 | End: 2022-06-29

## 2022-06-29 RX ORDER — POLYETHYLENE GLYCOL 3350 17 G/17G
17 POWDER, FOR SOLUTION ORAL DAILY
Status: DISCONTINUED | OUTPATIENT
Start: 2022-06-30 | End: 2022-07-08

## 2022-06-29 RX ORDER — MAGNESIUM SULFATE HEPTAHYDRATE 40 MG/ML
2 INJECTION, SOLUTION INTRAVENOUS ONCE
Status: COMPLETED | OUTPATIENT
Start: 2022-06-29 | End: 2022-06-29

## 2022-06-29 RX ADMIN — SALINE NASAL SPRAY 2 SPRAY: 1.5 SOLUTION NASAL at 16:08

## 2022-06-29 RX ADMIN — SALINE NASAL SPRAY 2 SPRAY: 1.5 SOLUTION NASAL at 12:49

## 2022-06-29 RX ADMIN — Medication 10 MG: at 20:48

## 2022-06-29 RX ADMIN — ASPIRIN 81 MG CHEWABLE TABLET 81 MG: 81 TABLET CHEWABLE at 08:22

## 2022-06-29 RX ADMIN — THIAMINE HCL TAB 100 MG 100 MG: 100 TAB at 08:22

## 2022-06-29 RX ADMIN — MAGNESIUM SULFATE IN WATER 2 G: 40 INJECTION, SOLUTION INTRAVENOUS at 05:49

## 2022-06-29 RX ADMIN — TRAZODONE HYDROCHLORIDE 100 MG: 50 TABLET ORAL at 20:48

## 2022-06-29 RX ADMIN — IRON SUCROSE 300 MG: 20 INJECTION, SOLUTION INTRAVENOUS at 12:49

## 2022-06-29 RX ADMIN — ACETAMINOPHEN 975 MG: 325 TABLET, FILM COATED ORAL at 14:13

## 2022-06-29 RX ADMIN — HYDROXYCHLOROQUINE SULFATE 200 MG: 200 TABLET, FILM COATED ORAL at 20:47

## 2022-06-29 RX ADMIN — BUMETANIDE 2 MG: 0.25 INJECTION INTRAMUSCULAR; INTRAVENOUS at 09:23

## 2022-06-29 RX ADMIN — FLUTICASONE FUROATE AND VILANTEROL TRIFENATATE 1 PUFF: 100; 25 POWDER RESPIRATORY (INHALATION) at 08:23

## 2022-06-29 RX ADMIN — OXYCODONE HYDROCHLORIDE 5 MG: 5 TABLET ORAL at 20:47

## 2022-06-29 RX ADMIN — SALINE NASAL SPRAY 2 SPRAY: 1.5 SOLUTION NASAL at 08:23

## 2022-06-29 RX ADMIN — DULOXETINE 30 MG: 30 CAPSULE, DELAYED RELEASE ORAL at 08:22

## 2022-06-29 RX ADMIN — MAGNESIUM SULFATE IN WATER 2 G: 40 INJECTION, SOLUTION INTRAVENOUS at 22:55

## 2022-06-29 RX ADMIN — TAMSULOSIN HYDROCHLORIDE 0.4 MG: 0.4 CAPSULE ORAL at 08:22

## 2022-06-29 RX ADMIN — HEPARIN SODIUM 1200 UNITS/HR: 10000 INJECTION, SOLUTION INTRAVENOUS at 11:11

## 2022-06-29 RX ADMIN — HYDROXYCHLOROQUINE SULFATE 200 MG: 200 TABLET, FILM COATED ORAL at 08:22

## 2022-06-29 ASSESSMENT — ACTIVITIES OF DAILY LIVING (ADL)
ADLS_ACUITY_SCORE: 28

## 2022-06-29 NOTE — PLAN OF CARE
Neuro: intact, FINCH equal bilaterally, PEARRLA; SBA ambulated halls  Cardio: SR with 1st degree AV block, HR 80-90s, MAP >65, afebrile; IABP 1:1 100% augmented  Respiratory: CLS, no SOB, using IS  GI/: received bumex, voiding adequately, LBM 6/28, 2g Na diet with 2L FR  Skin: R subclavian IABP dressing saturated, MD aware CXR confirmed placement of balloon, R groin site intact      Handoff given to following RN. For VS and complete assessments, please see documentation flowsheets.

## 2022-06-29 NOTE — PLAN OF CARE
Pertinent PMH: Dandy is being followed by [facility] 2 who was admitted on 06/17 for OHT/advanced therapies w/o s/p femoral IABP, changed to subclavian IABP on 06/23.  Major Shift Events: A&OX4, neuro intact, follows commands, afebrile. Complains of incisional pain during dressing changes s/p 5mg oxycodone for pain. Able to maintain SpO2 > 94% on RA, LS dim in LL. R. Subclavian IABP 1:1, 100% augmentation, MAPs 30 - 60's, although using cuff pressure per cardiology orders. Pulses dopplerable. Cuff pressure MAPs 70 - 90's, SR/ST on tele. Heparin gtt at 1200 units/Hr, last 10A was therapeutic. CVP 12 - 15. HBG stable, do not infuse PRBC until HBG < 6.5 per orders. Pt is able to void spontaneously in bedside urinal, adequate UOP. BM during shift prior. IABP stat lock peeling off, needs to be changed by CVTS. Lytes replaced as needed.  Plan: CVTS to change stat lock on IABP. OHT w/o possible need for swan on 07/01. Re-evaluate OHT status 07/04. Continue to monitor.

## 2022-06-29 NOTE — PROGRESS NOTES
Cardiology Progress Note 6/29/22       Changes Today:  - continue IABP 1:1,  - Resent PRA 6/28, prior to transfusion   - appears a little hypervolemic today, bumex 2 mg IV*1 given  - s/p subclavian IABP 6/23/22              - IABP MAP not accurate, currently base his blood pressure on his cuff pressure  - promote ambulation + continue caloric count  - remains status 2  - plan for in and out RHC 7/1/22, needs renewal of status 2 on 7/4/22    Physical Exam   Temp: 98  F (36.7  C) Temp src: Oral BP: 106/55 Pulse: 86   Resp: 19 SpO2: 96 % O2 Device: None (Room air)      Vital Signs with Ranges  Temp:  [97.4  F (36.3  C)-98.2  F (36.8  C)] 98  F (36.7  C)  Pulse:  [] 86  Resp:  [18-30] 19  BP: ()/(55-79) 106/55  SpO2:  [93 %-99 %] 96 %     DATE MAP CVP PAP PCWP Marley CO Marley CI SVR PVR Therapies   6/24/22  10 AM 73 8 36/18 18 5.5 3.1 1163 1.1 - cont IABP 1:1  - cont dobu 2.5  - off nipride  - s/p bumex 2 mg IV*1      6/25/22 72 8 39/21 11 4.9 2.8 1050 3.2 - continue IABP  - continue dobu  - hold off diuresis    6/26/22  (swan pullled) 72 4 40/21 12 4.9 2.8 1110 3 - continue IABP and dobu  - defer IVF despite low CVP   6/27/22 73 10 - - 6.7 3.8 - - - continue IABP and dobu   6/29/22  74  8-9     4.4   2.5  1180(calculated using JVP)                                                     IABP: via R Subclavian 1:1  Triggered by EKG  Augmented MAPs      Intake/Output    Intake/Output Summary (Last 24 hours) at 6/28/2022 0739  +2789  -2450    +1370  -3675    Weight  Vitals:    06/25/22 0400 06/26/22 1000 06/27/22 0000 06/27/22 0839   Weight: 64.8 kg (142 lb 13.7 oz) 65 kg (143 lb 6.4 oz) 64.8 kg (142 lb 12.8 oz) 66.1 kg (145 lb 11.2 oz)    06/28/22 0900   Weight: 66.5 kg (146 lb 9.7 oz)       Resp: 19     heparin 1,200 Units/hr (06/29/22 0800)        aspirin  81 mg Oral Daily    DULoxetine  30 mg Oral Daily    fluticasone-vilanterol  1 puff Inhalation Daily    hydroxychloroquine  200 mg Oral BID    iron sucrose  "(VENOFER) intermittent infusion  300 mg Intravenous Q72H    lidocaine  2 patch Transdermal Q24h    And    lidocaine   Transdermal Q8H CAMMY    melatonin  10 mg Oral At Bedtime    polyethylene glycol  17 g Oral TID    senna-docusate  3 tablet Oral BID    sodium chloride  2 spray Both Nostrils Q3H    tamsulosin  0.4 mg Oral Daily    thiamine  100 mg Oral Daily    traZODone  100 mg Oral At Bedtime        , Blood pressure 106/55, pulse 86, temperature 98  F (36.7  C), temperature source Oral, resp. rate 19, height 1.702 m (5' 7\"), weight 66.5 kg (146 lb 9.7 oz), SpO2 96 %.  GEN: Alert, oriented x 3, appears comfortable, NAD.  CV: Regular rate and rhythm, JVP 8-9, left radial pulses present  LUNGS: Clear to auscultation bilaterally   ABD: Active bowel sounds, soft, non-tender/non-distended.  No rebound/guarding/rigidity.  EXT: No edema or cyanosis.      Data   Recent Labs   Lab Test 06/28/22 0407 06/28/22  0402 06/27/22  1604 06/27/22  0355   *  --   --  130*   POTASSIUM 4.1  --   --  4.0   CHLORIDE 101  --   --  101   CO2 21*  --   --  19*   ANIONGAP 10  --   --  10   * 124*   < > 106*   BUN 9.4  --   --  9.4   CR 0.47*  --   --  0.56*   ELDA 9.1  --   --  8.8    < > = values in this interval not displayed.       Lab Results   Component Value Date    WBC 6.0 06/28/2022     Lab Results   Component Value Date    RBC 2.20 06/28/2022     Lab Results   Component Value Date    HGB 6.3 06/28/2022     Lab Results   Component Value Date    HCT 21.8 06/28/2022     No components found for: MCT  Lab Results   Component Value Date    MCV 99 06/28/2022     Lab Results   Component Value Date    MCH 28.6 06/28/2022     Lab Results   Component Value Date    MCHC 28.9 06/28/2022     Lab Results   Component Value Date    RDW 17.0 06/28/2022     Lab Results   Component Value Date     06/28/2022        Liver Function Studies -   Recent Labs   Lab Test 06/28/22 0407   PROTTOTAL 5.8*   ALBUMIN 2.8*   BILITOTAL 0.3   ALKPHOS " 118   AST 34   ALT 96*       Venous Blood Gas  Recent Labs   Lab 06/29/22  0353 06/28/22  0407 06/27/22  0355 06/26/22  1002   PHV 7.38 7.41 7.42 7.42   PCO2V 42 39* 39* 39*   PO2V 31 32 34 30   HCO3V 25 25 25 25   RINA -0.5 0.0 0.3 0.3   O2PER 21 21 21 21       Arterial Blood Gas  Recent Labs   Lab 06/29/22  0353 06/28/22  0407 06/27/22  0355 06/26/22  1002   O2PER 21 21 21 21          Recent Results (from the past 24 hour(s))   XR Chest Port 1 View    Narrative    XR CHEST PORT 1 VIEW  6/29/2022 4:04 AM      HISTORY: IABP position    COMPARISON: 6/28/2022    FINDINGS:   Single AP view of the chest. Left chest wall ICD with leads in similar  position. Right arm PICC tip overlying the mid SVC. Superior IABP  marker overlying the proximal descending thoracic aorta versus distal  aortic arch. Stable mediastinum. Coronary artery stent. No  pneumothorax or significant left pleural effusion. Probable trace  right pleural effusion. Increased diffuse interstitial and airspace  opacities.      Impression    IMPRESSION:   1. Superior IABP marker overlying the proximal descending thoracic  aorta versus distal aortic arch, 2.5 cm above the paola. Stable  remaining devices.  2. Cardiomegaly with increased probable pulmonary edema.  3. Trace right pleural effusion.    I have personally reviewed the examination and initial interpretation  and I agree with the findings.    YESENIA KEITH DO         SYSTEM ID:  [id number]       Blood culture:  No results found for this or any previous visit.   Urine culture:  Results for orders placed or performed during the hospital encounter of 05/16/22   Urine Culture Aerobic Bacterial    Specimen: Urine, Midstream   Result Value Ref Range    Culture No Growth        Assessment & Plan     61-year-old man with history of ischemic cardiomyopathy LVEF 15%) NYHA class IV symptoms ACC stage D, multiple admissions with heart failure over the past several months. Presented with cardiogenic shock c/b  multi-organ failure (JOSE; Acute liver injury/shock liver) requiring mechanical hemodynamic support. Now listed as status 2 for OHT. Course c/b large epistaxis 6/22 with 400 ml blood. Had subclavian IABP placed instead of femoral IABP 6/23/22. Hemoglobin gradually downtrending without e/o further e/o bleeding. Continue to optimize his hemodynamics while awaiting for heart transplant.     ===============  CARDIOVASCULAR  ===============  # Ambulatory cardiogenic shock SCAI D  # Advanced ischemic cardiomyopathy, Class IV, Stage D  # s/p CRT-D ([employer] 2006, gen change 2012)  # SVT  # Nonsustained VT  # CAD s/p PCI to LAD (2005)  # Hx of MI (2007)  # Severe ostial LAD disease with in-stent restenosis of mid LAD at diag bifurcation, severe proximal RCA disease, mild circ disease now s/p ostial LAD and proximal RCA lithotripsy and stenting on 5/24/22  CMRI showing infarcted myocardium in LAD territory. Given the degree of LV dysfunction/dilation, moderate to severe functional MR and lack of viable myocardium, cardiac surgery would be high risk. Now s/p ostial LAD and proximal RCA lithotripsy and stenting on 5/24/22.      Plan:  He will remain on 1:1 IABP with dobutamine gtt 2.5 to maintain his hemodynamics + hemodynamic-guided diuresis & afterload reduction. Monitor hemodynamic q6h.    - hemodynamic support:              - Continue iABP 1:1                          - IABP MAP not accurate, will use cuff pressure for his blood pressure             - Inotropes:                            - continue dobutamine 2.5; previously weaned off 6/22/22, restarted ~ 12 PM 6/23/22  - Afterload reduction:             - afterload reduction effect from dobutamine             - continue to hold hydralazine 50 mg, isordil 20 mg              - off nitroprusside since 6/24 in the AM  - Fluid status:              - appears slightly volume up from exam findings: JVP ~8-9 from physical exam, post transfusion             - bumex 2 mg IV *1              - Goal CVP 8-10  - Anticoagulation:              - warfarin on hold             - low intensity heparin gtt continued for h/o DVT/PE                         - restarted 6PM 6/23 after subclavian IABP placement  - Antiplatelet: Continue ASA-81mg   - Statin: hold statin   - BMP BID, K>4 & Mg>2  - Strict I/Os: Goal even to positive for low CVP  - Daily weight     ===========   PULMONARY  ===========  # Mild-Moderate Emphysema  # Hx of COVID-19  Former smoker 2 ppd for 40 years quit on 09/09/09.  - Appreciate pulmonary consultation given emphysema  - ILD panel:                           - AAT 1 level: in process                          - autoimmune work- up:                                      - LASHANDA positive                                      - Anti Helen-1 IgG negative                                      - SSA RO, SSB LA Ab negative                                      - Scleroderma ab scl 70 negative                                      - RF negative                                      - anti CCP 1.7                                      - ANCA IgG < 1:10                                      - Aldolase: 19.6(elevated)              - no need for PFTs with DLCO                                     - if symptoms are considered related to pulm findings: inhaler therapy with LABA/LAMA combination              - will need outpatient pulmonary follow up with seiral PFT hs- CT scan with inspiration and expiration views     =====  Renal  =====  -Strict I&O while diuresing   -Monitor Electrolytes while actively diuresing K->4 Mg->2     ===================   GASTROENTEROLOGY  ===================  # Severe malnutrition in the context of chronic illness  # Liver injury, in the setting of cardiogenic shock  # GERD  - monitor  - Diuresis as above  - Supplements to help maintain nutrition.  - Caloric count      ====  CNS  ====  # Depression  # Anxiety due to medical condition  # Insomnia  - Hydroxyzine prn for anxiety  -  Melatonin 10 mg prn     # Pain  - oxycodone 5 mg PO prn q6h     ========  Endocrine  ========  TSH WNL HBA1C 5.5   -Continue to monitor FS while in ICU, Insulin GTT as needed     ============   HEMATOLOGY  ============  # Hx of DVT and PE  # APL  Was on chronic warfarin.  - continue low intensity heparin gtt     # Anemia, acute on chronic  # Iron deficiency  Baseline hgb 8-9. Started to downtrend since nose bleed 6/22. Gradually downtrending without signs of new or ongoing bleeding. Found to have iron deficiency.   - defer transfusion at this time; goal Hgb 6.5              - transfuse 2 units if hgb < 6.5, nonirradiated  - continue iron IV *3 doses  - continue pediatric tubes for labs, limited blood draw  - monitor for signs of bleeding     # Profuse epistaxis from the left nare with bloody emesis  ~ 400 ml of bloody emesis in the blue bag on arrival. Also had one severe epistaxis that occurred at [place] requiring cauterization.  - Left nasal cavity packed with all absorbable packing - surgifoam + surgicel + surgiflo. No antibiotics are indicated due to absorbable packing.  - saline nasal pray q3h  - if begins to  in quantity of bleeding would recommend spraying the nasal cavity copiously with afrin and holding pressure over the soft part of the nose for 20 minutes continuously. Nasal clips are ineffective and should not be used in place of digital pressure. If bleeding continues despite these measures, repeat. If bleeding continues or is severe please contact ENT.     ==   ID  ==  ROHIT     ===============  RHEUMATOLOGY  ===============  #Lupus  # APL  - Continue pta hydroxychloroquine 200mg bid   - PTA mycophenolic acid 1500mg q12h on hold in anticipation for LVAD  -- Would plan to hold MMF one week prior to surgery and re-starting 5-7 days after LVAD placement in the absence of surgical site infection, poor wound healing or infection elsewhere.     ===    ===  - Continue pta tamsulosin 0.4mg daily      Diet: Low salt diet <2 g/24 hours  DVT Prophylaxis: Heparin GTT  Fitzgerald Catheter: In place  Code Status: Full   Lines: PICC Line    This patient was seen and discussed with Dr. Mendez who agrees with the above assessment and plan.     Liborio Layton MD  PGY-2 Internal Medicine

## 2022-06-29 NOTE — PROGRESS NOTES
Calorie Count  Intake recorded for: 6/28  Total Kcals: 367 Total Protein: 7g  Kcals from Hospital Food: 367   Protein: 7g  Kcals from Outside Food (average):0 Protein: 0g  # Meals Ordered from Kitchen: 3 meals  # Meals Recorded: 1 meal (100% damion mist, baked lays, three bean chili)  # Supplements Recorded: 0

## 2022-06-30 ENCOUNTER — TELEPHONE (OUTPATIENT)
Dept: TRANSPLANT | Facility: CLINIC | Age: 62
End: 2022-06-30

## 2022-06-30 ENCOUNTER — APPOINTMENT (OUTPATIENT)
Dept: GENERAL RADIOLOGY | Facility: CLINIC | Age: 62
DRG: 001 | End: 2022-06-30
Attending: INTERNAL MEDICINE
Payer: COMMERCIAL

## 2022-06-30 VITALS — WEIGHT: 145 LBS | BODY MASS INDEX: 22.71 KG/M2

## 2022-06-30 LAB
ALBUMIN SERPL BCG-MCNC: 2.8 G/DL (ref 3.5–5.2)
ALBUMIN SERPL BCG-MCNC: 2.9 G/DL (ref 3.5–5.2)
ALP SERPL-CCNC: 110 U/L (ref 40–129)
ALP SERPL-CCNC: 122 U/L (ref 40–129)
ALT SERPL W P-5'-P-CCNC: 57 U/L (ref 10–50)
ALT SERPL W P-5'-P-CCNC: 69 U/L (ref 10–50)
ANION GAP SERPL CALCULATED.3IONS-SCNC: 11 MMOL/L (ref 7–15)
ANION GAP SERPL CALCULATED.3IONS-SCNC: 11 MMOL/L (ref 7–15)
AST SERPL W P-5'-P-CCNC: 26 U/L (ref 10–50)
AST SERPL W P-5'-P-CCNC: 26 U/L (ref 10–50)
BASE EXCESS BLDV CALC-SCNC: 0.2 MMOL/L (ref -7.7–1.9)
BILIRUB SERPL-MCNC: 0.3 MG/DL
BILIRUB SERPL-MCNC: 0.3 MG/DL
BUN SERPL-MCNC: 10.3 MG/DL (ref 8–23)
BUN SERPL-MCNC: 13.3 MG/DL (ref 8–23)
CALCIUM SERPL-MCNC: 8.2 MG/DL (ref 8.8–10.2)
CALCIUM SERPL-MCNC: 8.5 MG/DL (ref 8.8–10.2)
CHLORIDE SERPL-SCNC: 101 MMOL/L (ref 98–107)
CHLORIDE SERPL-SCNC: 102 MMOL/L (ref 98–107)
CREAT SERPL-MCNC: 0.65 MG/DL (ref 0.67–1.17)
CREAT SERPL-MCNC: 0.66 MG/DL (ref 0.67–1.17)
DEPRECATED HCO3 PLAS-SCNC: 18 MMOL/L (ref 22–29)
DEPRECATED HCO3 PLAS-SCNC: 22 MMOL/L (ref 22–29)
ERYTHROCYTE [DISTWIDTH] IN BLOOD BY AUTOMATED COUNT: 17.4 % (ref 10–15)
ERYTHROCYTE [DISTWIDTH] IN BLOOD BY AUTOMATED COUNT: 17.8 % (ref 10–15)
GFR SERPL CREATININE-BSD FRML MDRD: >90 ML/MIN/1.73M2
GFR SERPL CREATININE-BSD FRML MDRD: >90 ML/MIN/1.73M2
GLUCOSE SERPL-MCNC: 106 MG/DL (ref 70–99)
GLUCOSE SERPL-MCNC: 98 MG/DL (ref 70–99)
HCO3 BLDV-SCNC: 25 MMOL/L (ref 21–28)
HCT VFR BLD AUTO: 26.5 % (ref 40–53)
HCT VFR BLD AUTO: 26.7 % (ref 40–53)
HGB BLD-MCNC: 8.2 G/DL (ref 13.3–17.7)
HGB BLD-MCNC: 8.3 G/DL (ref 13.3–17.7)
INR PPP: 1.13 (ref 0.85–1.15)
MAGNESIUM SERPL-MCNC: 1.6 MG/DL (ref 1.7–2.3)
MAGNESIUM SERPL-MCNC: 4 MG/DL (ref 1.7–2.3)
MCH RBC QN AUTO: 28.9 PG (ref 26.5–33)
MCH RBC QN AUTO: 29.1 PG (ref 26.5–33)
MCHC RBC AUTO-ENTMCNC: 30.7 G/DL (ref 31.5–36.5)
MCHC RBC AUTO-ENTMCNC: 31.3 G/DL (ref 31.5–36.5)
MCV RBC AUTO: 93 FL (ref 78–100)
MCV RBC AUTO: 94 FL (ref 78–100)
O2/TOTAL GAS SETTING VFR VENT: 21 %
OXYHGB MFR BLDV: 56 % (ref 70–75)
PCO2 BLDV: 40 MM HG (ref 40–50)
PH BLDV: 7.4 [PH] (ref 7.32–7.43)
PHOSPHATE SERPL-MCNC: 4.6 MG/DL (ref 2.5–4.5)
PLATELET # BLD AUTO: 329 10E3/UL (ref 150–450)
PLATELET # BLD AUTO: 340 10E3/UL (ref 150–450)
PO2 BLDV: 33 MM HG (ref 25–47)
POTASSIUM SERPL-SCNC: 3.9 MMOL/L (ref 3.4–5.3)
POTASSIUM SERPL-SCNC: 4 MMOL/L (ref 3.4–5.3)
PROT SERPL-MCNC: 5.7 G/DL (ref 6.4–8.3)
PROT SERPL-MCNC: 5.9 G/DL (ref 6.4–8.3)
RBC # BLD AUTO: 2.84 10E6/UL (ref 4.4–5.9)
RBC # BLD AUTO: 2.85 10E6/UL (ref 4.4–5.9)
SODIUM SERPL-SCNC: 130 MMOL/L (ref 136–145)
SODIUM SERPL-SCNC: 135 MMOL/L (ref 136–145)
UFH PPP CHRO-ACNC: 0.34 IU/ML
WBC # BLD AUTO: 7 10E3/UL (ref 4–11)
WBC # BLD AUTO: 8.4 10E3/UL (ref 4–11)

## 2022-06-30 PROCEDURE — 82805 BLOOD GASES W/O2 SATURATION: CPT | Performed by: STUDENT IN AN ORGANIZED HEALTH CARE EDUCATION/TRAINING PROGRAM

## 2022-06-30 PROCEDURE — 86832 HLA CLASS I HIGH DEFIN QUAL: CPT | Performed by: INTERNAL MEDICINE

## 2022-06-30 PROCEDURE — 86833 HLA CLASS II HIGH DEFIN QUAL: CPT | Performed by: INTERNAL MEDICINE

## 2022-06-30 PROCEDURE — 80053 COMPREHEN METABOLIC PANEL: CPT | Performed by: STUDENT IN AN ORGANIZED HEALTH CARE EDUCATION/TRAINING PROGRAM

## 2022-06-30 PROCEDURE — 93005 ELECTROCARDIOGRAM TRACING: CPT

## 2022-06-30 PROCEDURE — 85027 COMPLETE CBC AUTOMATED: CPT | Performed by: STUDENT IN AN ORGANIZED HEALTH CARE EDUCATION/TRAINING PROGRAM

## 2022-06-30 PROCEDURE — 250N000013 HC RX MED GY IP 250 OP 250 PS 637: Performed by: STUDENT IN AN ORGANIZED HEALTH CARE EDUCATION/TRAINING PROGRAM

## 2022-06-30 PROCEDURE — 84155 ASSAY OF PROTEIN SERUM: CPT | Performed by: STUDENT IN AN ORGANIZED HEALTH CARE EDUCATION/TRAINING PROGRAM

## 2022-06-30 PROCEDURE — 99291 CRITICAL CARE FIRST HOUR: CPT | Mod: GC | Performed by: INTERNAL MEDICINE

## 2022-06-30 PROCEDURE — 250N000009 HC RX 250: Performed by: INTERNAL MEDICINE

## 2022-06-30 PROCEDURE — 85520 HEPARIN ASSAY: CPT | Performed by: INTERNAL MEDICINE

## 2022-06-30 PROCEDURE — 83735 ASSAY OF MAGNESIUM: CPT | Performed by: INTERNAL MEDICINE

## 2022-06-30 PROCEDURE — 999N000075 HC STATISTIC IABP MONITORING

## 2022-06-30 PROCEDURE — 272N000004 HC RX 272: Performed by: INTERNAL MEDICINE

## 2022-06-30 PROCEDURE — 250N000011 HC RX IP 250 OP 636

## 2022-06-30 PROCEDURE — 999N000128 HC STATISTIC PERIPHERAL IV START W/O US GUIDANCE

## 2022-06-30 PROCEDURE — 84100 ASSAY OF PHOSPHORUS: CPT | Performed by: INTERNAL MEDICINE

## 2022-06-30 PROCEDURE — 250N000013 HC RX MED GY IP 250 OP 250 PS 637: Performed by: INTERNAL MEDICINE

## 2022-06-30 PROCEDURE — 71045 X-RAY EXAM CHEST 1 VIEW: CPT

## 2022-06-30 PROCEDURE — 250N000013 HC RX MED GY IP 250 OP 250 PS 637

## 2022-06-30 PROCEDURE — 71045 X-RAY EXAM CHEST 1 VIEW: CPT | Mod: 26 | Performed by: RADIOLOGY

## 2022-06-30 PROCEDURE — 200N000002 HC R&B ICU UMMC

## 2022-06-30 PROCEDURE — 250N000011 HC RX IP 250 OP 636: Performed by: STUDENT IN AN ORGANIZED HEALTH CARE EDUCATION/TRAINING PROGRAM

## 2022-06-30 PROCEDURE — 85610 PROTHROMBIN TIME: CPT | Performed by: INTERNAL MEDICINE

## 2022-06-30 PROCEDURE — 93010 ELECTROCARDIOGRAM REPORT: CPT | Performed by: INTERNAL MEDICINE

## 2022-06-30 RX ORDER — BUMETANIDE 0.25 MG/ML
2 INJECTION INTRAMUSCULAR; INTRAVENOUS ONCE
Status: COMPLETED | OUTPATIENT
Start: 2022-06-30 | End: 2022-06-30

## 2022-06-30 RX ADMIN — SILVER NITRATE: 38.21; 12.74 STICK TOPICAL at 22:03

## 2022-06-30 RX ADMIN — DULOXETINE 30 MG: 30 CAPSULE, DELAYED RELEASE ORAL at 08:11

## 2022-06-30 RX ADMIN — FLUTICASONE FUROATE AND VILANTEROL TRIFENATATE 1 PUFF: 100; 25 POWDER RESPIRATORY (INHALATION) at 08:14

## 2022-06-30 RX ADMIN — HEPARIN SODIUM 1200 UNITS/HR: 10000 INJECTION, SOLUTION INTRAVENOUS at 06:19

## 2022-06-30 RX ADMIN — BUMETANIDE 2 MG: 0.25 INJECTION INTRAMUSCULAR; INTRAVENOUS at 09:39

## 2022-06-30 RX ADMIN — THROMBIN HUMAN 1 KIT: 2000 POWDER, FOR SOLUTION TOPICAL at 22:00

## 2022-06-30 RX ADMIN — Medication 10 MG: at 20:01

## 2022-06-30 RX ADMIN — TRAZODONE HYDROCHLORIDE 100 MG: 50 TABLET ORAL at 22:05

## 2022-06-30 RX ADMIN — SALINE NASAL SPRAY 2 SPRAY: 1.5 SOLUTION NASAL at 08:14

## 2022-06-30 RX ADMIN — SENNOSIDES AND DOCUSATE SODIUM 2 TABLET: 8.6; 5 TABLET ORAL at 08:11

## 2022-06-30 RX ADMIN — POLYETHYLENE GLYCOL 3350 17 G: 17 POWDER, FOR SOLUTION ORAL at 08:11

## 2022-06-30 RX ADMIN — Medication 1 EACH: at 22:02

## 2022-06-30 RX ADMIN — ASPIRIN 81 MG CHEWABLE TABLET 81 MG: 81 TABLET CHEWABLE at 08:10

## 2022-06-30 RX ADMIN — SALINE NASAL SPRAY 2 SPRAY: 1.5 SOLUTION NASAL at 12:29

## 2022-06-30 RX ADMIN — OXYCODONE HYDROCHLORIDE 5 MG: 5 TABLET ORAL at 19:59

## 2022-06-30 RX ADMIN — SENNOSIDES AND DOCUSATE SODIUM 2 TABLET: 8.6; 5 TABLET ORAL at 20:00

## 2022-06-30 RX ADMIN — HYDROXYCHLOROQUINE SULFATE 200 MG: 200 TABLET, FILM COATED ORAL at 08:11

## 2022-06-30 RX ADMIN — Medication: at 22:04

## 2022-06-30 RX ADMIN — TAMSULOSIN HYDROCHLORIDE 0.4 MG: 0.4 CAPSULE ORAL at 08:10

## 2022-06-30 RX ADMIN — HYDROXYCHLOROQUINE SULFATE 200 MG: 200 TABLET, FILM COATED ORAL at 20:00

## 2022-06-30 RX ADMIN — SALINE NASAL SPRAY 2 SPRAY: 1.5 SOLUTION NASAL at 00:04

## 2022-06-30 RX ADMIN — THIAMINE HCL TAB 100 MG 100 MG: 100 TAB at 08:11

## 2022-06-30 ASSESSMENT — ACTIVITIES OF DAILY LIVING (ADL)
ADLS_ACUITY_SCORE: 28
ADLS_ACUITY_SCORE: 29
ADLS_ACUITY_SCORE: 28
ADLS_ACUITY_SCORE: 29
ADLS_ACUITY_SCORE: 28
ADLS_ACUITY_SCORE: 29

## 2022-06-30 NOTE — PROGRESS NOTES
Cardiology Progress Note       Changes Today:   - continue IABP 1:1  - s/p subclavian IABP 6/23/22              - IABP MAP not accurate, currently base his blood pressure on his cuff pressure  - Hgb remains >6.5 without signs of bleeding              - transfuse 2 units if hgb < 6.5  - promote ambulation + continue caloric count  - remains status 2  - plan for in and out [facility]HC 7/1/22, needs renewal of status 2 on 7/4/22    Physical Exam   Temp: 98.3  F (36.8  C) Temp src: Oral BP: 104/57 Pulse: 90   Resp: 24 SpO2: 94 % O2 Device: None (Room air)      Vital Signs with Ranges  Temp:  [97.7  F (36.5  C)-98.3  F (36.8  C)] 98.3  F (36.8  C)  Pulse:  [84-98] 90  Resp:  [14-28] 24  BP: ()/(55-77) 104/57  SpO2:  [88 %-99 %] 94 %    DATE MAP CVP PAP PCWP Marley CO Marley CI SVR PVR Therapies   6/24/22  10 AM 73 8 36/18 18 5.5 3.1 1163 1.1 - cont IABP 1:1  - cont dobu 2.5  - off nipride  - s/p bumex 2 mg IV*1      6/25/22 72 8 39/21 11 4.9 2.8 1050 3.2 - continue IABP  - continue dobu  - hold off diuresis    6/26/22  (swan pullled) 72 4 40/21 12 4.9 2.8 1110 3 - continue IABP and dobu  - defer IVF despite low CVP   6/27/22 73 10 - - 6.7 3.8 - - - continue IABP and dobu   6/29/22  74  8-9     4.4   2.5  1180(calculated using JVP)        6/30/2022 68  11  -   - 4.8   2.4     -Balloon pump -Dobutamine 5                             IABP: via R Subclavian 1:1  Triggered by EKG  Augmented MAPs       Intake/Output  +2228  -3950    -1722    +529  -1200    -671       Weight  Vitals:    06/27/22 0000 06/27/22 0839 06/28/22 0900 06/29/22 0900   Weight: 64.8 kg (142 lb 12.8 oz) 66.1 kg (145 lb 11.2 oz) 66.5 kg (146 lb 9.7 oz) 66 kg (145 lb 8 oz)    06/30/22 0800   Weight: 66.1 kg (145 lb 11.2 oz)       Resp: 24        heparin 1,200 Units/hr (06/30/22 0800)         aspirin  81 mg Oral Daily     DULoxetine  30 mg Oral Daily     fluticasone-vilanterol  1 puff Inhalation Daily     hydroxychloroquine  200 mg Oral BID     iron sucrose  "(VENOFER) intermittent infusion  300 mg Intravenous Q72H     melatonin  10 mg Oral At Bedtime     polyethylene glycol  17 g Oral Daily     senna-docusate  2 tablet Oral BID     sodium chloride  2 spray Both Nostrils Q3H     tamsulosin  0.4 mg Oral Daily     thiamine  100 mg Oral Daily     traZODone  100 mg Oral At Bedtime        , Blood pressure 104/57, pulse 90, temperature 98.3  F (36.8  C), temperature source Oral, resp. rate 24, height 1.702 m (5' 7\"), weight 66.1 kg (145 lb 11.2 oz), SpO2 94 %.  GEN:  Alert, oriented x 3, appears comfortable, NAD.  CV:  Regular rate and rhythm, no murmur or JVD.  S1 + S2 noted, no S3 or S4.  LUNGS:  Clear to auscultation bilaterally   ABD:  Active bowel sounds, soft, non-tender/non-distended.  No rebound/guarding/rigidity.  EXT:  No edema or cyanosis.      Data   Recent Labs   Lab Test 06/30/22 0401 06/29/22 2006 06/29/22  0358 06/29/22  0353   *  --   --  130*   POTASSIUM 4.0 4.0  --  4.3   CHLORIDE 102  --   --  100   CO2 22  --   --  21*   ANIONGAP 11  --   --  9   GLC 98  --  88 89   BUN 10.3  --   --  9.7   CR 0.66*  --   --  0.63*   ELDA 8.5*  --   --  9.2       Lab Results   Component Value Date    WBC 7.0 06/30/2022     Lab Results   Component Value Date    RBC 2.84 06/30/2022     Lab Results   Component Value Date    HGB 8.2 06/30/2022     Lab Results   Component Value Date    HCT 26.7 06/30/2022     No components found for: MCT  Lab Results   Component Value Date    MCV 94 06/30/2022     Lab Results   Component Value Date    MCH 28.9 06/30/2022     Lab Results   Component Value Date    MCHC 30.7 06/30/2022     Lab Results   Component Value Date    RDW 17.8 06/30/2022     Lab Results   Component Value Date     06/30/2022        Liver Function Studies -   Recent Labs   Lab Test 06/30/22  0401   PROTTOTAL 5.9*   ALBUMIN 2.9*   BILITOTAL 0.3   ALKPHOS 122   AST 26   ALT 69*       Venous Blood Gas  Recent Labs   Lab 06/30/22  0401 06/29/22  0353 " 06/28/22  0407 06/27/22  0355   PHV 7.40 7.38 7.41 7.42   PCO2V 40 42 39* 39*   PO2V 33 31 32 34   HCO3V 25 25 25 25   RINA 0.2 -0.5 0.0 0.3   O2PER 21 21 21 21       Arterial Blood Gas  Recent Labs   Lab 06/30/22  0401 06/29/22  0353 06/28/22  0407 06/27/22  0355   O2PER 21 21 21 21          Recent Results (from the past 24 hour(s))   XR Chest Port 1 View    Narrative    EXAM: XR CHEST PORT 1 VIEW  6/29/2022 6:37 PM     HISTORY:  confirm position of the balloon pump       COMPARISON:  Previous earlier same day, 6/28/2022.    TECHNIQUE: AP view the chest in 90 degrees    FINDINGS:   Devices, lines, tubes: Intra-aortic balloon pump marker projecting  over the proximal descending thoracic aorta (left lateral to the T5  vertebral body). Left chest wall implantable cardiac defibrillator and  right upper extremity PICC are in similar positioning. Surgical clips  project over the right axilla. Coronary artery stenting.    The trachea is midline. The cardiomediastinal silhouette is stably  enlarged. No substantial change in diffuse interstitial opacities.  No  appreciable pneumothorax, pleural effusion, or focal consolidative  opacity. No acute osseous abnormality.        Impression    IMPRESSION:   1. Superior intra-aortic balloon pump marker projecting over the aorta  just above the level of the paola.  Remaining support devices are in  stable positioning.  2. Cardiomegaly with similar diffuse interstitial opacities, likely  pulmonary interstitial edema.    I have personally reviewed the examination and initial interpretation  and I agree with the findings.    SHAQ PRABHAKAR MD         SYSTEM ID:  [id number]       Blood culture:  No results found for this or any previous visit.   Urine culture:  Results for orders placed or performed during the hospital encounter of 05/16/22   Urine Culture Aerobic Bacterial    Specimen: Urine, Midstream   Result Value Ref Range    Culture No Growth        Assessment & Plan     61-year-old man with history of ischemic cardiomyopathy LVEF 15%) NYHA class IV symptoms ACC stage D, multiple admissions with heart failure over the past several months. Presented with cardiogenic shock c/b multi-organ failure (JOSE; Acute liver injury/shock liver) requiring mechanical hemodynamic support. Now listed as status 2 for OHT. Course c/b large epistaxis 6/22 with 400 ml blood. Had subclavian IABP placed instead of femoral IABP 6/23/22. Hemoglobin gradually downtrending without e/o further e/o bleeding. Continue to optimize his hemodynamics while awaiting for heart transplant.        ===============  CARDIOVASCULAR  ===============  # Ambulatory cardiogenic shock SCAI D  # Advanced ischemic cardiomyopathy, Class IV, Stage D  # s/p CRT-D ([employer] 2006, gen change 2012)  # SVT  # Nonsustained VT  # CAD s/p PCI to LAD (2005)  # Hx of MI (2007)  # Severe ostial LAD disease with in-stent restenosis of mid LAD at diag bifurcation, severe proximal RCA disease, mild circ disease now s/p ostial LAD and proximal RCA lithotripsy and stenting on 5/24/22  CMRI showing infarcted myocardium in LAD territory. Given the degree of LV dysfunction/dilation, moderate to severe functional MR and lack of viable myocardium, cardiac surgery would be high risk. Now s/p ostial LAD and proximal RCA lithotripsy and stenting on 5/24/22.      ===========   PULMONARY  ===========  # Mild-Moderate Emphysema  # Hx of COVID-19  Former smoker 2 ppd for 40 years quit on 09/09/09.  - Appreciate pulmonary consultation given emphysema  - ILD panel:                           - AAT 1 level: in process                          - autoimmune work- up:                                      - LASHANDA positive                                      - Anti Helen-1 IgG negative                                      - SSA RO, SSB LA Ab negative                                      - Scleroderma ab scl 70 negative                                      - RF  negative                                      - anti CCP 1.7                                      - ANCA IgG < 1:10                                      - Aldolase: 19.6(elevated)              - no need for PFTs with DLCO                                     - if symptoms are considered related to pulm findings: inhaler therapy with LABA/LAMA combination              - will need outpatient pulmonary follow up with seiral PFT hs- CT scan with inspiration and expiration views     =====  Renal  =====  -Strict I&O while diuresing   -Monitor Electrolytes while actively diuresing K->4 Mg->2     ===================   GASTROENTEROLOGY  ===================  # Severe malnutrition in the context of chronic illness  # Liver injury, in the setting of cardiogenic shock  # GERD  - monitor  - Diuresis as above  - Supplements to help maintain nutrition.  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  - continue low intensity heparin gtt     # Anemia, acute on chronic  # Iron deficiency  Baseline hgb 8-9. Started to downtrend since nose bleed 6/22. Gradually downtrending without signs of new or ongoing bleeding. Found to have iron deficiency.   - defer transfusion at this time; goal Hgb 6.5              - transfuse 2 units if hgb < 6.5, nonirradiated  - continue iron IV *3 doses  - continue pediatric tubes for labs, limited blood draw  - monitor for signs of bleeding     # Profuse epistaxis from the left nare with bloody emesis  ~ 400 ml of bloody emesis in the blue bag on arrival. Also had one severe epistaxis that occurred at [place] requiring cauterization.  - Left nasal  cavity packed with all absorbable packing - surgifoam + surgicel + surgiflo. No antibiotics are indicated due to absorbable packing.  - saline nasal pray q3h  - if begins to  in quantity of bleeding would recommend spraying the nasal cavity copiously with afrin and holding pressure over the soft part of the nose for 20 minutes continuously. Nasal clips are ineffective and should not be used in place of digital pressure. If bleeding continues despite these measures, repeat. If bleeding continues or is severe please contact ENT.     ==   ID  ==  ROHIT     ===============  RHEUMATOLOGY  ===============  #Lupus  # APL  - Continue pta hydroxychloroquine 200mg bid   - PTA mycophenolic acid 1500mg q12h on hold in anticipation for LVAD  -- Would plan to hold MMF one week prior to surgery and re-starting 5-7 days after LVAD placement in the absence of surgical site infection, poor wound healing or infection elsewhere.     ===    ===  - Continue pta tamsulosin 0.4mg daily     Diet: Low salt diet <2 g/24 hours  DVT Prophylaxis: Heparin GTT  Fitzgerald Catheter: In place  Code Status: Full   Lines: PICC Line      This patient was seen and discussed with Dr. Mendez who agrees with the above assessment and plan.     Agapito Alexander MD  Cardiology Fellow  [phone]

## 2022-06-30 NOTE — PLAN OF CARE
Goal Outcome Evaluation:  Outcome Evaluation: No major shift events. IABP dressing changed. Dressing still leaking a bit of serosanguinous drainage overnight.   Neuro: A&Ox4.   Resp: RA. Clean/Dim LS.  CV: NS w/ 1 st AVB. AAI<=>DDD /115. HR 80s to 90. NIBPs in 90s. IABP maps 60s to 80s. IABP 1:1 100% augmentation. Optimal waveforms tonight. CVP 12->14.  : PT voids spontaneous  Over 4 voids pt put out 106 mL/hr.   GI: No BM. Pt does not want BM meds.   Plan: Status 2 for heart transplant/Lvad. Immune tests pending.   For vital signs and complete assessments, please see documentation flowsheets.

## 2022-06-30 NOTE — PROGRESS NOTES
SPIRITUAL HEALTH SERVICES  SPIRITUAL ASSESSMENT Progress Note  [facility] ([place]) 4E     REFERRAL SOURCE: Follow up    Pt was receptive to  visit.  He shared his hopes for getting a new heart, and wanted to be able to be discharged from the hospital.  He asked for prayer, which was provided.   explored how pt is coping and what is sustaining him as he continues his hospitalization.  Pt finds deep meaning in his art, and spends time in prayer.      PLAN: [facility] will continue to visit and provide support     Yoli Benedict  Pager: [phone]

## 2022-07-01 ENCOUNTER — APPOINTMENT (OUTPATIENT)
Dept: GENERAL RADIOLOGY | Facility: CLINIC | Age: 62
DRG: 001 | End: 2022-07-01
Attending: INTERNAL MEDICINE
Payer: COMMERCIAL

## 2022-07-01 ENCOUNTER — APPOINTMENT (OUTPATIENT)
Dept: OCCUPATIONAL THERAPY | Facility: CLINIC | Age: 62
DRG: 001 | End: 2022-07-01
Attending: INTERNAL MEDICINE
Payer: COMMERCIAL

## 2022-07-01 ENCOUNTER — TEAM CONFERENCE (OUTPATIENT)
Dept: CARDIOLOGY | Facility: CLINIC | Age: 62
End: 2022-07-01

## 2022-07-01 LAB
A1AT PHENOTYP SERPL-IMP: ABNORMAL
A1AT SERPL-MCNC: 222 MG/DL
ATRIAL RATE - MUSE: 133 BPM
BASE EXCESS BLDV CALC-SCNC: 0.3 MMOL/L (ref -7.7–1.9)
DIASTOLIC BLOOD PRESSURE - MUSE: NORMAL MMHG
HCO3 BLDV-SCNC: 25 MMOL/L (ref 21–28)
HGB BLD-MCNC: 9.1 G/DL (ref 13.3–17.7)
HGB BLD-MCNC: 9.5 G/DL (ref 13.3–17.7)
INR PPP: 1.12 (ref 0.85–1.15)
INTERPRETATION ECG - MUSE: NORMAL
O2/TOTAL GAS SETTING VFR VENT: 21 %
OXYHGB MFR BLDV: 39 % (ref 92–100)
OXYHGB MFR BLDV: 51 % (ref 70–75)
OXYHGB MFR BLDV: 91 % (ref 92–100)
P AXIS - MUSE: NORMAL DEGREES
PCO2 BLDV: 40 MM HG (ref 40–50)
PH BLDV: 7.41 [PH] (ref 7.32–7.43)
PO2 BLDV: 31 MM HG (ref 25–47)
PR INTERVAL - MUSE: NORMAL MS
QRS DURATION - MUSE: 144 MS
QT - MUSE: 332 MS
QTC - MUSE: 495 MS
R AXIS - MUSE: -43 DEGREES
SA 1 CELL: NORMAL
SA 1 TEST METHOD: NORMAL
SA 2 CELL: NORMAL
SA 2 TEST METHOD: NORMAL
SA1 HI RISK ABY: NORMAL
SA1 MOD RISK ABY: NORMAL
SA2 HI RISK ABY: NORMAL
SA2 MOD RISK ABY: NORMAL
SYSTOLIC BLOOD PRESSURE - MUSE: NORMAL MMHG
T AXIS - MUSE: 97 DEGREES
UFH PPP CHRO-ACNC: 0.34 IU/ML
UNACCEPTABLE ANTIGENS: NORMAL
UNOS CPRA: 97
VENTRICULAR RATE- MUSE: 134 BPM
ZZZSA 1  COMMENTS: NORMAL
ZZZSA 2 COMMENTS: NORMAL

## 2022-07-01 PROCEDURE — 93451 RIGHT HEART CATH: CPT | Mod: 26 | Performed by: INTERNAL MEDICINE

## 2022-07-01 PROCEDURE — 250N000013 HC RX MED GY IP 250 OP 250 PS 637

## 2022-07-01 PROCEDURE — 85610 PROTHROMBIN TIME: CPT | Performed by: INTERNAL MEDICINE

## 2022-07-01 PROCEDURE — 82810 BLOOD GASES O2 SAT ONLY: CPT

## 2022-07-01 PROCEDURE — 93451 RIGHT HEART CATH: CPT | Performed by: INTERNAL MEDICINE

## 2022-07-01 PROCEDURE — 85520 HEPARIN ASSAY: CPT | Performed by: INTERNAL MEDICINE

## 2022-07-01 PROCEDURE — 4A023N6 MEASUREMENT OF CARDIAC SAMPLING AND PRESSURE, RIGHT HEART, PERCUTANEOUS APPROACH: ICD-10-PCS | Performed by: INTERNAL MEDICINE

## 2022-07-01 PROCEDURE — 250N000009 HC RX 250: Performed by: INTERNAL MEDICINE

## 2022-07-01 PROCEDURE — 200N000002 HC R&B ICU UMMC

## 2022-07-01 PROCEDURE — 250N000013 HC RX MED GY IP 250 OP 250 PS 637: Performed by: INTERNAL MEDICINE

## 2022-07-01 PROCEDURE — 71045 X-RAY EXAM CHEST 1 VIEW: CPT | Mod: 26 | Performed by: RADIOLOGY

## 2022-07-01 PROCEDURE — 71045 X-RAY EXAM CHEST 1 VIEW: CPT

## 2022-07-01 PROCEDURE — 999N000248 HC STATISTIC IV INSERT WITH US BY RN

## 2022-07-01 PROCEDURE — 272N000001 HC OR GENERAL SUPPLY STERILE: Performed by: INTERNAL MEDICINE

## 2022-07-01 PROCEDURE — 999N000075 HC STATISTIC IABP MONITORING

## 2022-07-01 PROCEDURE — 82805 BLOOD GASES W/O2 SATURATION: CPT | Performed by: STUDENT IN AN ORGANIZED HEALTH CARE EDUCATION/TRAINING PROGRAM

## 2022-07-01 PROCEDURE — 85018 HEMOGLOBIN: CPT

## 2022-07-01 PROCEDURE — 97110 THERAPEUTIC EXERCISES: CPT | Mod: GO | Performed by: OCCUPATIONAL THERAPIST

## 2022-07-01 PROCEDURE — 250N000013 HC RX MED GY IP 250 OP 250 PS 637: Performed by: STUDENT IN AN ORGANIZED HEALTH CARE EDUCATION/TRAINING PROGRAM

## 2022-07-01 PROCEDURE — 250N000011 HC RX IP 250 OP 636: Performed by: STUDENT IN AN ORGANIZED HEALTH CARE EDUCATION/TRAINING PROGRAM

## 2022-07-01 PROCEDURE — 250N000011 HC RX IP 250 OP 636: Performed by: INTERNAL MEDICINE

## 2022-07-01 PROCEDURE — 99291 CRITICAL CARE FIRST HOUR: CPT | Mod: 25 | Performed by: INTERNAL MEDICINE

## 2022-07-01 RX ORDER — MAGNESIUM SULFATE HEPTAHYDRATE 40 MG/ML
2 INJECTION, SOLUTION INTRAVENOUS ONCE
Status: DISCONTINUED | OUTPATIENT
Start: 2022-07-01 | End: 2022-07-01

## 2022-07-01 RX ORDER — MAGNESIUM SULFATE HEPTAHYDRATE 40 MG/ML
2 INJECTION, SOLUTION INTRAVENOUS ONCE
Status: COMPLETED | OUTPATIENT
Start: 2022-07-01 | End: 2022-07-01

## 2022-07-01 RX ADMIN — HEPARIN SODIUM 1200 UNITS/HR: 10000 INJECTION, SOLUTION INTRAVENOUS at 02:19

## 2022-07-01 RX ADMIN — MAGNESIUM SULFATE HEPTAHYDRATE 2 G: 40 INJECTION, SOLUTION INTRAVENOUS at 01:11

## 2022-07-01 RX ADMIN — TRAZODONE HYDROCHLORIDE 100 MG: 50 TABLET ORAL at 20:00

## 2022-07-01 RX ADMIN — OXYCODONE HYDROCHLORIDE 5 MG: 5 TABLET ORAL at 21:13

## 2022-07-01 RX ADMIN — OXYCODONE HYDROCHLORIDE 5 MG: 5 TABLET ORAL at 03:21

## 2022-07-01 RX ADMIN — SALINE NASAL SPRAY 2 SPRAY: 1.5 SOLUTION NASAL at 18:41

## 2022-07-01 RX ADMIN — THIAMINE HCL TAB 100 MG 100 MG: 100 TAB at 08:15

## 2022-07-01 RX ADMIN — SALINE NASAL SPRAY 2 SPRAY: 1.5 SOLUTION NASAL at 13:27

## 2022-07-01 RX ADMIN — HYDROXYZINE HYDROCHLORIDE 25 MG: 25 TABLET, FILM COATED ORAL at 03:21

## 2022-07-01 RX ADMIN — Medication 10 MG: at 20:00

## 2022-07-01 RX ADMIN — SENNOSIDES AND DOCUSATE SODIUM 2 TABLET: 8.6; 5 TABLET ORAL at 19:58

## 2022-07-01 RX ADMIN — SALINE NASAL SPRAY 2 SPRAY: 1.5 SOLUTION NASAL at 05:42

## 2022-07-01 RX ADMIN — TAMSULOSIN HYDROCHLORIDE 0.4 MG: 0.4 CAPSULE ORAL at 08:15

## 2022-07-01 RX ADMIN — DULOXETINE 30 MG: 30 CAPSULE, DELAYED RELEASE ORAL at 08:15

## 2022-07-01 RX ADMIN — SALINE NASAL SPRAY 2 SPRAY: 1.5 SOLUTION NASAL at 15:39

## 2022-07-01 RX ADMIN — ASPIRIN 81 MG CHEWABLE TABLET 81 MG: 81 TABLET CHEWABLE at 08:15

## 2022-07-01 RX ADMIN — SALINE NASAL SPRAY 2 SPRAY: 1.5 SOLUTION NASAL at 08:16

## 2022-07-01 RX ADMIN — HYDROXYCHLOROQUINE SULFATE 200 MG: 200 TABLET, FILM COATED ORAL at 19:57

## 2022-07-01 RX ADMIN — ACETAMINOPHEN 975 MG: 325 TABLET, FILM COATED ORAL at 23:56

## 2022-07-01 RX ADMIN — HYDROXYCHLOROQUINE SULFATE 200 MG: 200 TABLET, FILM COATED ORAL at 08:15

## 2022-07-01 RX ADMIN — SALINE NASAL SPRAY 2 SPRAY: 1.5 SOLUTION NASAL at 20:01

## 2022-07-01 RX ADMIN — FLUTICASONE FUROATE AND VILANTEROL TRIFENATATE 1 PUFF: 100; 25 POWDER RESPIRATORY (INHALATION) at 08:16

## 2022-07-01 ASSESSMENT — ACTIVITIES OF DAILY LIVING (ADL)
ADLS_ACUITY_SCORE: 29
ADLS_ACUITY_SCORE: 28
ADLS_ACUITY_SCORE: 29
ADLS_ACUITY_SCORE: 28
ADLS_ACUITY_SCORE: 29
ADLS_ACUITY_SCORE: 29

## 2022-07-01 ASSESSMENT — NEW YORK HEART ASSOCIATION (NYHA) CLASSIFICATION: NYHA FUNCTIONAL CLASS: IV

## 2022-07-01 ASSESSMENT — PULMONARY FUNCTION TESTS: FEV1 (%PREDICTED): 2

## 2022-07-01 NOTE — TELEPHONE ENCOUNTER
Patient discussed on an urgent basis 6/15/2022 for transplant listing- work up was complete for both LVAD and transplant, and eligibility for LVAD confirmed on the 6/17/22 full team meeting.        VAD Multidisciplinary Review  Multidisciplinary review date: 6/17/22  Inclusion Criteria  Discussed VAD with patient and/or decision makers. Reviewed anticipated survival benefit, anticipated functional improvement, risks, and benefits. Patient and/or decision maker verbalize understanding and agree to VAD implant?: Yes  VAD is elective procedure?: Yes  NYHA class: IV  INTERMACS score: 2  Patient has: acute onset critical illness with precipitous decline. Significant risk posed to patient by delaying VAD implant.  Additional detail: recent MI and recovery  Cardiopulmonary stress testing completed?: None related to acute illness  LVEF is: < 25%  Exclusion Criteria  Has condition, other than heart failure, which would limit survival to < 2 years?: No  Cardiomyopathy with restrictive physiology, unless severe LV systolic failure and LV dilation present?: No  Technical obstacles that pose and inordinately high surgical risk in the judgment of the MCS surgeon?: No  Active systemic infection? (unless ALL of following criteria met: negative blood cultures x 2 days, antibiotic treatment x 7 days and Infectious Disease clearance pre-operatively): No  Presence of active malignancy AND life expectancy < 2 years?: No  Stroke within the last six weeks, unless cleared by neurology team: No  Diagnosed with severe, irreversible pulmonary disease (supplemental O2 usage, FEV1 < 50% predicted): No  Pulmonary hypertension as a primary diagnosis: No  Chronic dialysis: No  Evidence of significant peripheral vascular disease accompanied by resting leg pain or ulceration unless treatment plan is in place: No  Carotid artery disease (>80% extracranial stenosis on Doppler) that could result in an adverse neurological event if left untreated:  No  Evidence of an untreated abdominal aortic aneurysm >5 cm as measured by abdominal ultrasounds within the last 180 days unless treatment plan in place: No  Severe or irreversible hepatic disease, evidence of shock liver, and/o biopsy-proven cirrhosis: No  Active contraindication to anticoagulation, INR > 2.5 in absence of concurrent anticoagulation therapy, platelet < 50,000, history of intolerance to anticoagulation, or other coagulopathy unless Hematology consulted and plan is in place: No  Acute valvular infective endocarditis with bacteremia: No  Neuromuscular disease, psychiatric diagnosis, dementia or other process that severely compromises ability to use and care for external system components or to ambulate/exercise: No  Inability to understand the procedure, risk involved, or to comply with follow-up evaluation by VAD team: No  For menstruating females: Current pregnancy or unable/unwilling to follow contraception plan: Not applicable  Relative Contraindications  Alcohol and/or recreational drug abuse within past six months: No  Significant history of depression or other mental illness, refractory to therapy or thought to be a risk to successful outcome with MCS, as per assessment of trained professional: No  Demonstrated on-going non-compliance with demonstrated inability to comply with medical recommendations on multiple occasions: No  Unsafe living environment or lack of adequate support system: No  Lack of adequate insurance coverage or waiver by hospital administration: No  Evidence of other ongoing physical, financial, or psychosocial issues that will preclude the intended goal of safety and improvements in quality and duration of life, unless a plan for resolution and support is in process: No  Multidisciplinary Decision  Decision: Approved Comment: does have a small LV cavity size and a history of autoimmune disease - which is why we are pursuing transplant first- if he has deterioration will  plan to move to LVAD   Implant as: Bridge to Transplant  Listing status: 2 (Adult)

## 2022-07-01 NOTE — PLAN OF CARE
Major Shift Events:  Shift [id number]     Neuro: Patient AOx4, calls appropriately, makes needs known. TG FINCH. Oxy 5mg given with PM meds for incisional pain to right side chest/shoulder. Scleral hemorrhage bilateral since 6/25(improving), patient states this is common with his lupus.     Patient had nose bleed at the beginning of the shift. Per ENT note, Afrin was applied twice in attempt to stop bleed in bilateral nostrils. ENT paged at 2000, bedside by 2200 and repacked left nostril. 0000 Hgb 8.3     Cardiac: SR/Sinus Tach. HR . CRT-D, AAA<=>DDD , MAP goal>65, IABP MAPs not accurate, following blood pressure off cuff pressures per cardiology note, 1:1, 100% augmentation R subclavian IABP. Augmentation more optimal while patient is awake. CVP goal 8-10. Blood pressures WNL. Afebrile.     2330 - Patient became tachycardic, PVCs/PACs, HR in the 130s, CVP 16. Cards 2 Lm called, Stat EKG ordered. Wide QRS. Patient back to normal sinus by 0000. Magnesium 1.6 - replacement given. MD came to bedside at 0015 to interrogate ICD but patient was back to SR. No other orders, continue to monitor.    Resp: oxygen saturation WNL on room air. Clear lung sounds. Uses bedside IS frequently.      GI/: No BM for shift, passing gas, active bowel sounds. 2G sodium/2L fluid restriction diet ordered. Good appetite. Patient voids spontaneously using bedside urinal, shift total 1900.    NPO at midnight for procedure in the morning.      Heparin gtt @ 1200units/hr - AM Xa 0.34 - therapeutic - recheck tomorrow morning.     Plan: RHC in the AM. Status 2 for heart transplant. Continue POC.     For vital signs and complete assessments, please see documentation flowsheets.      Goal Outcome Evaluation:    Plan of Care Reviewed With: patient     Overall Patient Progress: no change    Outcome Evaluation: awaiting heart transplant, status 2. VSS on room air. ENT repacked left nostril due to epistaxis. IABP 1:1      Problem:  Adjustment to Illness (Heart Failure)  Goal: Optimal Coping  Intervention: Support Psychosocial Response  Recent Flowsheet Documentation  Taken 7/1/2022 0000 by Miracle Eldridge RN  Supportive Measures:    active listening utilized    decision-making supported    goal-setting facilitated    positive reinforcement provided    relaxation techniques promoted    verbalization of feelings encouraged  Taken 6/30/2022 2000 by Miracle Eldridge RN  Supportive Measures:    active listening utilized    decision-making supported    goal-setting facilitated    positive reinforcement provided    relaxation techniques promoted    verbalization of feelings encouraged  Family/Support System Care:    involvement promoted    self-care encouraged    support provided  Taken 6/30/2022 1700 by Miracle Eldridge RN  Supportive Measures:    active listening utilized    decision-making supported    goal-setting facilitated    positive reinforcement provided    relaxation techniques promoted    verbalization of feelings encouraged  Family/Support System Care:    involvement promoted    self-care encouraged    support provided     Problem: Plan of Care - These are the overarching goals to be used throughout the patient stay.    Goal: Optimal Comfort and Wellbeing  Intervention: Monitor Pain and Promote Comfort  Recent Flowsheet Documentation  Taken 7/1/2022 0000 by Miracle Eldridge RN  Pain Management Interventions:    medication (see MAR)    care clustered    distraction    emotional support    environmental changes    pillow support provided    quiet environment facilitated    relaxation techniques promoted    repositioned    rest  Taken 6/30/2022 2000 by Miracle Eldridge RN  Pain Management Interventions:    medication (see MAR)    care clustered    distraction    emotional support    environmental changes    pillow support provided    quiet environment facilitated    relaxation techniques promoted    repositioned    rest  Taken  6/30/2022 1700 by Miracle Eldridge, RN  Pain Management Interventions:    medication (see MAR)    care clustered    distraction    emotional support    environmental changes    pillow support provided    quiet environment facilitated    relaxation techniques promoted    repositioned    rest

## 2022-07-01 NOTE — PLAN OF CARE
Major Shift Events:  pt had a R heart cath today, numbers per cath lab report. Awaiting antibody test. No issues with IABP. Ok to go outside per MD, will try for tomorrow. Hep held for r heart cath, restarted, check 10a tonight at 2300.     Plan: continue to be listed for heart transplant? Or LVAD next week.  For vital signs and complete assessments, please see documentation flowsheets.

## 2022-07-01 NOTE — PROGRESS NOTES
Pre-Transplant/VAD Social Work Services Progress Note      Date of Initial Social Work Evaluation: 5/18/2022 with admission assessment completed 6/20/2022  Collaborated with: Maricel several times this past week via phone and the patient in-person today in his room. Also consulted with Cards II team    Data: Dandy remains in ICU on 4E with balloon pump in place listed status 2 for heart transplant. According to the medical team, there has been a change with his antibodies and he requires further testing. The team has talked to him about the possibility of getting an LVAD instead of transplant if unable to get an extention. Patient verbalizes agreement with that if that becomes his plan. Dtr called writer yesterday to talk about that as well as lodging options and where they are on the list for an apartment. Explained that writer would be out of the office next week and and coworker would be following.  Intervention: Met with patient in his room and offered emotional support. Talked about the possibility of getting an LVAD instead of transplant in order to explore the patients thoughts and feelings about that and questions/concerns. Confirmed caregiver plan with patient and Maricel. Confirmed plans for lodging while waiting for [address].  Assessment: Patient talkative and reports that he is okay with getting the LVAD if unable to get the transplant. Coping appears appropriate. Dtr verbalized that she and her wife are staying in [place] at friend's house through Sunday, the 10th of July. Son plans to be here by the 8th and has a hotel set up in [place] while they wait for an apartment. He is currently #4 on the list for an apartment. Chantale reports she is 4-5 weeks away from giving birth to her first child, but despite this, she plans on being part of her Dad's caregiver team and will return to [facility] with her wife and the baby to help out.  Education provided by FAVIOLA: lodging options/caregiver  needs  Plan:    Discharge Plans in Progress: TBD    Barriers to d/c plan: Medical condition    Follow up Plan: SW will continue to follow for ongoing psychosocial support pre and post either transplant or LVAD. Will continue to assist with temporary relocation and monitor his progress on the list for an apartment.

## 2022-07-01 NOTE — PROGRESS NOTES
Brief ENT Progress Note  06/30/22    Subj/Intvl: Called to evaluate the patient for epistaxis. Patient reportedly receiving afrin for 2 hours with no improvement. Recently packed (1 week ago) with dissolvable packing by ENT. He said he may have blown his nose since then, and that the packing is gone now.    Obj:  HEENT: small amount of blood dripping from the left nasal cavity.  Respiratory: breathing comfortably on room air.    NASAL ENDOSCOPY:  Due to epistaxis, nasal endoscopy was indicated. After obtaining consent, the rigid zero degree scope was passed under endoscopic vision through the right nasal passage. The nasal mucosa was pink and moist. No bleeding noted in the right nasal cavity. Approximately 1cm septal perforation visualized with some dried scab along superior aspect in left nasal passage. The scope was then passed through the left nasal passage, bleeding noted at the posterior border of the septal perforation    PROCEDURE:  Blood and clots suctioned from nasopharynx. An afrin soaked piece of gauze was applied to the bleeding area. The area was then cauterized lightly with silver nitrate. The left nasal cavity was then packed with surgifoam wrapped in surgicel. The left nasal cavity was then coated with surgiflo. The patient was then observed for 15 minutes with no further epistaxis noted. He gargled water and no further blood was seen dripping down the posterior oropharynx.     A&P:  61-year-old male with a history of CAD, ischemic CM,DVT/PE, antiphospholipid syndrome, on a heparin drip, seen previously in consultation by ENT 1 week ago and absorbable packing was placed, but says he was blowing his nose and may have blown out what was left. Now s/p re-placement of surgifoam, surgicel and surgiflo.    - Left nasal cavity packed with all absorbable packing - surgifoam + surgicel + surgiflo. No antibiotics are indicated due to absorbable packing.  - Begin with nasal saline q3h while awake starting on  8/3  - Some slow oozing is to be expected, if begins to  in quantity of bleeding would recommend spraying the nasal cavity copiously with afrin and holding pressure over the soft part of the nose for 20 minutes continuously.   - If bleeding continues despite these measures, or is severe, please contact ENT.    Patient discussed with chief resident, John Loza.    Sunitha Carpenter MD   Otolaryngology-Head & Neck Surgery PGY2  Please contact ENT on call with questions

## 2022-07-01 NOTE — PROGRESS NOTES
[facility]    ICU Progress Note       Date of Admission:  6/17/2022    Assessment: Critical Care   61-year-old man with history of ischemic cardiomyopathy LVEF 15%) NYHA class IV symptoms ACC stage D, multiple admissions with heart failure over the past several months. Presented with cardiogenic shock c/b multi-organ failure (JOSE; Acute liver injury/shock liver) requiring mechanical hemodynamic support. Now listed as status 2 for OHT. Course c/b large epistaxis 6/22 with 400 ml blood. Had subclavian IABP placed instead of femoral IABP 6/23/22. Hemoglobin gradually downtrending without e/o further e/o bleeding. Continue to optimize his hemodynamics while awaiting for heart transplant.      Updates:  -  RHC today, no medical therapy changes      Plan: Critical Care      ===============  CARDIOVASCULAR  ===============  # Ambulatory cardiogenic shock SCAI D  # Advanced ischemic cardiomyopathy, Class IV, Stage D  # s/p CRT-D (Medtronic 2006, gen change 2012)  # SVT  # Nonsustained VT  # CAD s/p PCI to LAD (2005)  # Hx of MI (2007)  # Severe ostial LAD disease with in-stent restenosis of mid LAD at diag bifurcation, severe proximal RCA disease, mild circ disease now s/p ostial LAD and proximal RCA lithotripsy and stenting on 5/24/22  CMRI showing infarcted myocardium in LAD territory. Given the degree of LV dysfunction/dilation, moderate to severe functional MR and lack of viable myocardium, cardiac surgery would be high risk. Now s/p ostial LAD and proximal RCA lithotripsy and stenting on 5/24/22.   - RHC 7/1 to reassess hemodynamics  - Mechanical support: IABP 1:1, continuing (placed 6/23/22)  - Anticoagulation: heparin gtt, holding prior to cath lab for RHC  - Volume status: CVP mildly elevated around 14, no diuresis plan for today with improving markers of end organ function and no clear peripheral edema   - BB: contraindicated  - ACEi/ARB/ARNI: none  - MRA: none  -  SGLT2i: none  - SCD prophylaxis: Medtronic CRT-D     LVAD/transplant workup started, discussion with patient:  - Echocardiogram done  - Cardiopulmonary stress testing ordered  - Infectious labs: Quant Gold (-), VZV IgG (+), Treponema Abs w/reflex RPR/titer (-), Toxoplasma IgG (-), Hepatitis serology (all negative, HepB nonimmune), HSV1/2 IgG (-), HIV combo (-), EBV/CMV IgG (both positive)  - Other labs: blood type (O positive), iron studies, prealbumin 22, UA done, TSH 1.65, PRA single antigen IgG antibody pending  - Substance abuse screen: PEth (-), UDS (-)  - RHC done, current hemodynamic monitoring  - Imaging: CT Chest A/P with Chronic interstitial subpleural fibrotic disease with paraseptal emphysematous change, US abdomen complete w/doppler , US JOE Doppler, US LE arterial duplex b/l all unremarkable  - PFTs, 6 Minute Walk Test not done  - Consults: Dental, CVTS, Palliative Care, Nutrition, Neuropsych, Social Work Dental consult  - CVTS consulted    ===========   PULMONARY  ===========  # Mild-Moderate Emphysema  # Hx of COVID-19  Former smoker 2 ppd for 40 years quit on 09/09/09.  - Appreciate pulmonary consultation given emphysema  - ILD panel:                           - AAT 1 level: in process                          - autoimmune work- up:                                      - LASHANDA positive                                      - Anti Helen-1 IgG negative                                      - SSA RO, SSB LA Ab negative                                      - Scleroderma ab scl 70 negative                                      - RF negative                                      - anti CCP 1.7                                      - ANCA IgG < 1:10                                      - Aldolase: 19.6(elevated)              - no need for PFTs with DLCO                                     - if symptoms are considered related to pulm findings: inhaler therapy with LABA/LAMA combination              - will need  outpatient pulmonary follow up with seiral PFT hs- CT scan with inspiration and expiration views     =====  Renal  =====  -Strict I&O while diuresing   -Monitor Electrolytes while actively diuresing K->4 Mg->2     ===================   GASTROENTEROLOGY  ===================  # Severe malnutrition in the context of chronic illness  # Liver injury, in the setting of cardiogenic shock  # GERD  - monitor  - Diuresis as above  - Supplements to help maintain nutrition.  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  - continue low intensity heparin gtt     # Anemia, acute on chronic  # Iron deficiency  Baseline hgb 8-9. Started to downtrend since nose bleed 6/22. Gradually downtrending without signs of new or ongoing bleeding. Found to have iron deficiency.   - defer transfusion at this time; goal Hgb 6.5              - transfuse 2 units if hgb < 6.5, nonirradiated  - continue iron IV *3 doses [id number]  - continue pediatric tubes for labs, limited blood draw  - monitor for signs of bleeding     # Profuse epistaxis from the left nare with bloody emesis  ~ 400 ml of bloody emesis in the blue bag on arrival. Also had one severe epistaxis that occurred at [place] requiring cauterization.  - Left nasal cavity packed with all absorbable packing - surgifoam + surgicel + surgiflo. No antibiotics are indicated due to absorbable packing.  - saline nasal pray q3h  - if begins to  in quantity of bleeding would recommend spraying the nasal cavity copiously with afrin and holding pressure over the soft part of the nose for 20 minutes continuously. Nasal clips are  ineffective and should not be used in place of digital pressure. If bleeding continues despite these measures, repeat. If bleeding continues or is severe please contact ENT.     ==   ID  ==  ROHIT     ===============  RHEUMATOLOGY  ===============  #Lupus  # APL  - Continue pta hydroxychloroquine 200mg bid   - PTA mycophenolic acid 1500mg q12h on hold in anticipation for LVAD  -- Would plan to hold MMF one week prior to surgery and re-starting 5-7 days after LVAD placement in the absence of surgical site infection, poor wound healing or infection elsewhere.     ===    ===  - Continue pta tamsulosin 0.4mg daily      Lines/ tubes/ drains:  Fitzgerald Catheter: Not present  Central Lines: PRESENT  PICC Triple Lumen 06/17/22 Right Brachial vein medial-Site Assessment: WDL    Prophylaxis:  - DVT Prophylaxis: heparin gtt (held prior to RHC)  - PUD Prophylaxis: pantoprazole    Code Status: Full Code      Disposition:  - ICU    Staffed with Dr. Terry Luciano MD  [facility]  Securely message with the Vocera Web Console (learn more here)  Text page via [employer] Paging/Directory         Clinically Significant Risk Factors Present on Admission                       ______________________________________________________________________    Interval History   Overnight episode of epistaxis requiring ENT packing gauze with bleeding eventually stopping, hemoglobin 8.3 and stable this morning. Feeling well, no new lightheadedness or dizziness today.     Physical Exam   Vital Signs: Temp: 98.1  F (36.7  C) Temp src: Axillary BP: 104/63 Pulse: 97   Resp: 21 SpO2: 96 % O2 Device: None (Room air)    Weight: 145 lbs 6.4 oz  General Appearance: In no distress, comfortable appearing  Respiratory: clear to auscultation bilaterally  Cardiovascular: RRR, extremities warm, no peripheral edema  GI: soft  Skin: no rashes    Data   I reviewed all medications, new labs and imaging results over the  last 24 hours.  Arterial Blood Gases   No lab results found in last 7 days.    Complete Blood Count   Recent Labs   Lab 06/30/22 2258 06/30/22  0503 06/29/22 0353 06/28/22 0407   WBC 8.4 7.0 7.1 6.0   HGB 8.3* 8.2* 8.4* 6.3*    340 305 277       Basic Metabolic Panel  Recent Labs   Lab 06/30/22 2258 06/30/22 0401 06/29/22 2006 06/29/22 0358 06/29/22 0353 06/28/22 2015 06/28/22 0407   * 135*  --   --  130*  --  132*   POTASSIUM 3.9 4.0 4.0  --  4.3  --  4.1   CHLORIDE 101 102  --   --  100  --  101   CO2 18* 22  --   --  21*  --  21*   BUN 13.3 10.3  --   --  9.7  --  9.4   CR 0.65* 0.66*  --   --  0.63*  --  0.47*   * 98  --  88 89   < > 123*    < > = values in this interval not displayed.       Liver Function Tests  Recent Labs   Lab 07/01/22 0405 06/30/22 2258 06/30/22 0401 06/29/22 0353 06/28/22 0407   AST  --  26 26 30 34   ALT  --  57* 69* 87* 96*   ALKPHOS  --  110 122 132* 118   BILITOTAL  --  0.3 0.3 0.4 0.3   ALBUMIN  --  2.8* 2.9* 3.0* 2.8*   INR 1.12  --  1.13 1.10 1.13       Pancreatic Enzymes  No lab results found in last 7 days.    Coagulation Profile  Recent Labs   Lab 07/01/22 0405 06/30/22 0401 06/29/22 0353 06/28/22 0407   INR 1.12 1.13 1.10 1.13       IMAGING:  Recent Results (from the past 24 hour(s))   XR Chest Port 1 View    Narrative    XR CHEST PORT 1 VIEW  7/1/2022 6:13 AM      HISTORY: IABP position    COMPARISON: 6/30/2022    FINDINGS: AP view of the chest. Intra-aortic balloon pump marker is  just above the level of [place]. Stable left chest wall ICD and right  arm PICC. Cardiac silhouette is stable. No pneumothorax. Stable  chronic interstitial opacities, right greater than left. No acute  airspace opacities. Stable trace pleural effusions.      Impression    IMPRESSION: Intra-aortic balloon pump marker is just above the level  of [place]. No acute airspace opacities.    I have personally reviewed the examination and initial interpretation  and I  agree with the findings.    GABRIEL COURTNEY MD         SYSTEM ID:  [id number]

## 2022-07-02 ENCOUNTER — APPOINTMENT (OUTPATIENT)
Dept: GENERAL RADIOLOGY | Facility: CLINIC | Age: 62
DRG: 001 | End: 2022-07-02
Attending: INTERNAL MEDICINE
Payer: COMMERCIAL

## 2022-07-02 LAB
ALBUMIN SERPL BCG-MCNC: 3.2 G/DL (ref 3.5–5.2)
ALP SERPL-CCNC: 150 U/L (ref 40–129)
ALT SERPL W P-5'-P-CCNC: 72 U/L (ref 10–50)
ANION GAP SERPL CALCULATED.3IONS-SCNC: 10 MMOL/L (ref 7–15)
ANION GAP SERPL CALCULATED.3IONS-SCNC: 14 MMOL/L (ref 7–15)
AST SERPL W P-5'-P-CCNC: 42 U/L (ref 10–50)
BASE EXCESS BLDV CALC-SCNC: -0.9 MMOL/L (ref -7.7–1.9)
BILIRUB SERPL-MCNC: 0.3 MG/DL
BUN SERPL-MCNC: 13.9 MG/DL (ref 8–23)
BUN SERPL-MCNC: 14 MG/DL (ref 8–23)
CALCIUM SERPL-MCNC: 8.7 MG/DL (ref 8.8–10.2)
CALCIUM SERPL-MCNC: 9.1 MG/DL (ref 8.8–10.2)
CHLORIDE SERPL-SCNC: 100 MMOL/L (ref 98–107)
CHLORIDE SERPL-SCNC: 99 MMOL/L (ref 98–107)
CREAT SERPL-MCNC: 0.66 MG/DL (ref 0.67–1.17)
CREAT SERPL-MCNC: 0.77 MG/DL (ref 0.67–1.17)
DEPRECATED HCO3 PLAS-SCNC: 17 MMOL/L (ref 22–29)
DEPRECATED HCO3 PLAS-SCNC: 22 MMOL/L (ref 22–29)
ERYTHROCYTE [DISTWIDTH] IN BLOOD BY AUTOMATED COUNT: 17.1 % (ref 10–15)
GFR SERPL CREATININE-BSD FRML MDRD: >90 ML/MIN/1.73M2
GFR SERPL CREATININE-BSD FRML MDRD: >90 ML/MIN/1.73M2
GLUCOSE SERPL-MCNC: 118 MG/DL (ref 70–99)
GLUCOSE SERPL-MCNC: 96 MG/DL (ref 70–99)
HCO3 BLDV-SCNC: 24 MMOL/L (ref 21–28)
HCT VFR BLD AUTO: 29.2 % (ref 40–53)
HGB BLD-MCNC: 8.9 G/DL (ref 13.3–17.7)
INR PPP: 1.19 (ref 0.85–1.15)
LACTATE SERPL-SCNC: 0.8 MMOL/L (ref 0.7–2)
MAGNESIUM SERPL-MCNC: 2 MG/DL (ref 1.7–2.3)
MCH RBC QN AUTO: 28.5 PG (ref 26.5–33)
MCHC RBC AUTO-ENTMCNC: 30.5 G/DL (ref 31.5–36.5)
MCV RBC AUTO: 94 FL (ref 78–100)
O2/TOTAL GAS SETTING VFR VENT: 21 %
OXYHGB MFR BLDV: 45 % (ref 70–75)
PCO2 BLDV: 40 MM HG (ref 40–50)
PH BLDV: 7.39 [PH] (ref 7.32–7.43)
PLATELET # BLD AUTO: 369 10E3/UL (ref 150–450)
PO2 BLDV: 29 MM HG (ref 25–47)
POTASSIUM SERPL-SCNC: 4.2 MMOL/L (ref 3.4–5.3)
POTASSIUM SERPL-SCNC: 4.2 MMOL/L (ref 3.4–5.3)
PROT SERPL-MCNC: 6.6 G/DL (ref 6.4–8.3)
RADIOLOGIST FLAGS: ABNORMAL
RBC # BLD AUTO: 3.12 10E6/UL (ref 4.4–5.9)
SODIUM SERPL-SCNC: 130 MMOL/L (ref 136–145)
SODIUM SERPL-SCNC: 132 MMOL/L (ref 136–145)
UFH PPP CHRO-ACNC: 0.2 IU/ML
UFH PPP CHRO-ACNC: 0.33 IU/ML
UFH PPP CHRO-ACNC: 0.39 IU/ML
WBC # BLD AUTO: 7.3 10E3/UL (ref 4–11)

## 2022-07-02 PROCEDURE — 250N000011 HC RX IP 250 OP 636: Performed by: STUDENT IN AN ORGANIZED HEALTH CARE EDUCATION/TRAINING PROGRAM

## 2022-07-02 PROCEDURE — 80053 COMPREHEN METABOLIC PANEL: CPT | Performed by: STUDENT IN AN ORGANIZED HEALTH CARE EDUCATION/TRAINING PROGRAM

## 2022-07-02 PROCEDURE — 99291 CRITICAL CARE FIRST HOUR: CPT | Mod: GC | Performed by: INTERNAL MEDICINE

## 2022-07-02 PROCEDURE — 250N000013 HC RX MED GY IP 250 OP 250 PS 637: Performed by: INTERNAL MEDICINE

## 2022-07-02 PROCEDURE — 83605 ASSAY OF LACTIC ACID: CPT | Performed by: INTERNAL MEDICINE

## 2022-07-02 PROCEDURE — 999N000075 HC STATISTIC IABP MONITORING

## 2022-07-02 PROCEDURE — 200N000002 HC R&B ICU UMMC

## 2022-07-02 PROCEDURE — 250N000013 HC RX MED GY IP 250 OP 250 PS 637

## 2022-07-02 PROCEDURE — 250N000011 HC RX IP 250 OP 636: Performed by: INTERNAL MEDICINE

## 2022-07-02 PROCEDURE — 99207 PR NO CHARGE LOS: CPT | Performed by: INTERNAL MEDICINE

## 2022-07-02 PROCEDURE — 82805 BLOOD GASES W/O2 SATURATION: CPT | Performed by: STUDENT IN AN ORGANIZED HEALTH CARE EDUCATION/TRAINING PROGRAM

## 2022-07-02 PROCEDURE — 71045 X-RAY EXAM CHEST 1 VIEW: CPT | Mod: 26 | Performed by: RADIOLOGY

## 2022-07-02 PROCEDURE — 999N000155 HC STATISTIC RAPCV CVP MONITORING

## 2022-07-02 PROCEDURE — 85520 HEPARIN ASSAY: CPT | Performed by: INTERNAL MEDICINE

## 2022-07-02 PROCEDURE — 85027 COMPLETE CBC AUTOMATED: CPT | Performed by: STUDENT IN AN ORGANIZED HEALTH CARE EDUCATION/TRAINING PROGRAM

## 2022-07-02 PROCEDURE — 71045 X-RAY EXAM CHEST 1 VIEW: CPT

## 2022-07-02 PROCEDURE — 85610 PROTHROMBIN TIME: CPT | Performed by: INTERNAL MEDICINE

## 2022-07-02 PROCEDURE — 83735 ASSAY OF MAGNESIUM: CPT | Performed by: INTERNAL MEDICINE

## 2022-07-02 PROCEDURE — 250N000013 HC RX MED GY IP 250 OP 250 PS 637: Performed by: STUDENT IN AN ORGANIZED HEALTH CARE EDUCATION/TRAINING PROGRAM

## 2022-07-02 PROCEDURE — 258N000003 HC RX IP 258 OP 636: Performed by: STUDENT IN AN ORGANIZED HEALTH CARE EDUCATION/TRAINING PROGRAM

## 2022-07-02 RX ORDER — MAGNESIUM SULFATE HEPTAHYDRATE 40 MG/ML
2 INJECTION, SOLUTION INTRAVENOUS ONCE
Status: COMPLETED | OUTPATIENT
Start: 2022-07-02 | End: 2022-07-02

## 2022-07-02 RX ORDER — OXYCODONE HYDROCHLORIDE 5 MG/1
5 TABLET ORAL EVERY 4 HOURS PRN
Status: DISCONTINUED | OUTPATIENT
Start: 2022-07-02 | End: 2022-07-08

## 2022-07-02 RX ADMIN — TAMSULOSIN HYDROCHLORIDE 0.4 MG: 0.4 CAPSULE ORAL at 08:19

## 2022-07-02 RX ADMIN — HYDROXYCHLOROQUINE SULFATE 200 MG: 200 TABLET, FILM COATED ORAL at 08:19

## 2022-07-02 RX ADMIN — OXYCODONE HYDROCHLORIDE 5 MG: 5 TABLET ORAL at 01:54

## 2022-07-02 RX ADMIN — DULOXETINE 30 MG: 30 CAPSULE, DELAYED RELEASE ORAL at 08:19

## 2022-07-02 RX ADMIN — OXYCODONE HYDROCHLORIDE 5 MG: 5 TABLET ORAL at 19:53

## 2022-07-02 RX ADMIN — SALINE NASAL SPRAY 2 SPRAY: 1.5 SOLUTION NASAL at 20:12

## 2022-07-02 RX ADMIN — HYDROXYCHLOROQUINE SULFATE 200 MG: 200 TABLET, FILM COATED ORAL at 19:50

## 2022-07-02 RX ADMIN — HEPARIN SODIUM 1350 UNITS/HR: 10000 INJECTION, SOLUTION INTRAVENOUS at 06:18

## 2022-07-02 RX ADMIN — SALINE NASAL SPRAY 2 SPRAY: 1.5 SOLUTION NASAL at 11:52

## 2022-07-02 RX ADMIN — IRON SUCROSE 300 MG: 20 INJECTION, SOLUTION INTRAVENOUS at 11:51

## 2022-07-02 RX ADMIN — TRAZODONE HYDROCHLORIDE 100 MG: 50 TABLET ORAL at 20:12

## 2022-07-02 RX ADMIN — SENNOSIDES AND DOCUSATE SODIUM 2 TABLET: 8.6; 5 TABLET ORAL at 08:19

## 2022-07-02 RX ADMIN — SALINE NASAL SPRAY 2 SPRAY: 1.5 SOLUTION NASAL at 08:20

## 2022-07-02 RX ADMIN — SALINE NASAL SPRAY 2 SPRAY: 1.5 SOLUTION NASAL at 16:16

## 2022-07-02 RX ADMIN — SALINE NASAL SPRAY 2 SPRAY: 1.5 SOLUTION NASAL at 18:17

## 2022-07-02 RX ADMIN — FLUTICASONE FUROATE AND VILANTEROL TRIFENATATE 1 PUFF: 100; 25 POWDER RESPIRATORY (INHALATION) at 08:20

## 2022-07-02 RX ADMIN — THIAMINE HCL TAB 100 MG 100 MG: 100 TAB at 08:19

## 2022-07-02 RX ADMIN — POLYETHYLENE GLYCOL 3350 17 G: 17 POWDER, FOR SOLUTION ORAL at 08:19

## 2022-07-02 RX ADMIN — ASPIRIN 81 MG CHEWABLE TABLET 81 MG: 81 TABLET CHEWABLE at 08:19

## 2022-07-02 RX ADMIN — HEPARIN SODIUM 1350 UNITS/HR: 10000 INJECTION, SOLUTION INTRAVENOUS at 19:55

## 2022-07-02 RX ADMIN — Medication 10 MG: at 20:12

## 2022-07-02 RX ADMIN — MAGNESIUM SULFATE HEPTAHYDRATE 2 G: 40 INJECTION, SOLUTION INTRAVENOUS at 08:46

## 2022-07-02 RX ADMIN — SENNOSIDES AND DOCUSATE SODIUM 2 TABLET: 8.6; 5 TABLET ORAL at 19:50

## 2022-07-02 ASSESSMENT — ACTIVITIES OF DAILY LIVING (ADL)
ADLS_ACUITY_SCORE: 28

## 2022-07-02 NOTE — PLAN OF CARE
Major Shift Events:  heart transplant vs LVAD. Will plan for LVAD mid to end of next week if a heart doesn't become available. Pt up in chair most of the day.    Plan: LVAD next week if no heart transplant before then.    For vital signs and complete assessments, please see documentation flowsheets.

## 2022-07-02 NOTE — PROGRESS NOTES
[facility]    ICU Progress Note       Date of Admission:  6/17/2022    Assessment: Critical Care   61-year-old man with history of ischemic cardiomyopathy LVEF 15%) NYHA class IV symptoms ACC stage D, multiple admissions with heart failure over the past several months. Presented with cardiogenic shock c/b multi-organ failure (JOSE; Acute liver injury/shock liver) requiring mechanical hemodynamic support. Now listed as status 2 for OHT. Course c/b large epistaxis 6/22 with 400 ml blood. Had subclavian IABP placed instead of femoral IABP 6/23/22. Hemoglobin gradually downtrending without e/o further e/o bleeding. Continue to optimize his hemodynamics while awaiting for heart transplant.      Updates:  - PRA returned elevated at 97% (very high likelihood of acute rejection)  - Consideration for LVAD next week, further discussions to be had    Plan: Critical Care      ===============  CARDIOVASCULAR  ===============  # Ambulatory cardiogenic shock SCAI D  # Advanced ischemic cardiomyopathy, Class IV, Stage D  # s/p CRT-D (Medtronic 2006, gen change 2012)  # SVT  # Nonsustained VT  # CAD s/p PCI to LAD (2005)  # Hx of MI (2007)  # Severe ostial LAD disease with in-stent restenosis of mid LAD at diag bifurcation, severe proximal RCA disease, mild circ disease now s/p ostial LAD and proximal RCA lithotripsy and stenting on 5/24/22  CMRI showing infarcted myocardium in LAD territory. Given the degree of LV dysfunction/dilation, moderate to severe functional MR and lack of viable myocardium, cardiac surgery would be high risk. Now s/p ostial LAD and proximal RCA lithotripsy and stenting on 5/24/22. RHC 7/1 to reassess hemodynamics, results as below:    RHC 7/1:  RA 6  RV 52/7  PA 53/26 (35)  PCWP 22  Marley CO 3.63, CI 2.06,   TD CO 3.87, CI 2.19  PVR by TD CO 3.3    - Mechanical support: IABP 1:1, continuing (placed 6/23/22)  - Anticoagulation: heparin gtt DVT/PE in setting of  APL  - Volume status: euvolemic, no diuresis today   - BB: contraindicated  - ACEi/ARB/ARNI: none  - MRA: none  - SGLT2i: none  - SCD prophylaxis: Medtr[employer] CRT-D     LVAD/transplant workup started, discussion with patient:  - Echocardiogram done  - Cardiopulmonary stress testing ordered  - Infectious labs: Quant Gold (-), VZV IgG (+), Treponema Abs w/reflex RPR/titer (-), Toxoplasma IgG (-), Hepatitis serology (all negative, HepB nonimmune), HSV1/2 IgG (-), HIV combo (-), EBV/CMV IgG (both positive)  - Other labs: blood type (O positive), iron studies, prealbumin 22, UA done, TSH 1.65, PRA single antigen IgG antibody (reaction with 97% of antibodies in panel, high risk of acute rejection)  - Substance abuse screen: PEth (-), UDS (-)  - RHC done, current hemodynamic monitoring  - Imaging: CT Chest A/P with Chronic interstitial subpleural fibrotic disease with paraseptal emphysematous change, US abdomen complete w/doppler , US JOE Doppler, US LE arterial duplex b/l all unremarkable  - PFTs, 6 Minute Walk Test not done  - Consults: Dental, CVTS, Palliative Care, Nutrition, Neuropsych, Social Work Dental consult  - CVTS consulted    ===========   PULMONARY  ===========  # Mild-Moderate Emphysema  # Hx of COVID-19  Former smoker 2 ppd for 40 years quit on 09/09/09.  - Appreciate pulmonary consultation given emphysema  - ILD panel:                           - AAT 1 level: in process                          - autoimmune work- up:                                      - LASHANDA positive                                      - Anti Helen-1 IgG negative                                      - SSA RO, SSB LA Ab negative                                      - Scleroderma ab scl 70 negative                                      - RF negative                                      - anti CCP 1.7                                      - ANCA IgG < 1:10                                      - Aldolase: 19.6(elevated)              - no need for  PFTs with DLCO                                     - if symptoms are considered related to pulm findings: inhaler therapy with LABA/LAMA combination              - will need outpatient pulmonary follow up with serial PFT hs- CT scan with inspiration and expiration views     =====  Renal  =====  -Strict I&O while diuresing   -Monitor Electrolytes while actively diuresing K->4 Mg->2     ===================   GASTROENTEROLOGY  ===================  # Severe malnutrition in the context of chronic illness  # Liver injury, in the setting of cardiogenic shock  # GERD  - monitor  - Diuresis as above  - Supplements to help maintain nutrition.  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  - continue low intensity heparin gtt     # Anemia, acute on chronic  # Iron deficiency  Baseline hgb 8-9. Started to downtrend since nose bleed 6/22. Gradually downtrending without signs of new or ongoing bleeding. Found to have iron deficiency.   - defer transfusion at this time; goal Hgb 6.5              - transfuse 2 units if hgb < 6.5, nonirradiated  - continue iron IV *3 doses [id number]  - continue pediatric tubes for labs, limited blood draw  - monitor for signs of bleeding     # Profuse epistaxis from the left nare with bloody emesis  ~ 400 ml of bloody emesis in the blue bag on arrival. Also had one severe epistaxis that occurred at [place] requiring cauterization.  - Left nasal cavity packed with all absorbable packing - surgifoam + surgicel + surgiflo. No antibiotics are indicated due to absorbable packing.  - saline nasal pray q3h  - if begins to  in quantity of  bleeding would recommend spraying the nasal cavity copiously with afrin and holding pressure over the soft part of the nose for 20 minutes continuously. Nasal clips are ineffective and should not be used in place of digital pressure. If bleeding continues despite these measures, repeat. If bleeding continues or is severe please contact ENT.     ==   ID  ==  ROHIT     ===============  RHEUMATOLOGY  ===============  #Lupus  # APL  - Continue pta hydroxychloroquine 200mg bid   - PTA mycophenolic acid 1500mg q12h on hold in anticipation for LVAD  -- Would plan to hold MMF one week prior to surgery and re-starting 5-7 days after LVAD placement in the absence of surgical site infection, poor wound healing or infection elsewhere.     ===    ===  - Continue pta tamsulosin 0.4mg daily      Lines/ tubes/ drains:  Fitzgerald Catheter: Not present  Central Lines: PRESENT  PICC Triple Lumen 06/17/22 Right Brachial vein medial-Site Assessment: WDL    Prophylaxis:  - DVT Prophylaxis: heparin gtt (on for DVT/PE in setting of APL)  - PUD Prophylaxis: pantoprazole    Code Status: Full Code      Disposition:  - ICU    Staffed with Dr. Terry Luciano MD  [facility]  Securely message with the Vocera Web Console (learn more here)  Text page via [employer] Paging/Directory         Clinically Significant Risk Factors Present on Admission                       ______________________________________________________________________    Interval History   Overnight had some R shoulder pain around where the IABP was, xray done to verify positioning stated misalignment though upon verification appears was properly positioned, no intervention needed. Given oxycodone 5 mg with improvement.    Physical Exam   Vital Signs: Temp: 98.1  F (36.7  C) Temp src: Oral BP: 102/75 Pulse: 97   Resp: 20 SpO2: 96 % O2 Device: None (Room air)    Weight: 145 lbs 6.4 oz  General Appearance: In no distress,  comfortable appearing, sitting up in chair  Respiratory: clear to auscultation bilaterally  Cardiovascular: RRR, extremities warm, no peripheral edema  GI: soft  Skin: no rashes. Mild pain to palpation around R shoulder insertion site of IABP with some mild firmness, no hematoma.     Data   I reviewed all medications, new labs and imaging results over the last 24 hours.  Arterial Blood Gases   No lab results found in last 7 days.    Complete Blood Count   Recent Labs   Lab 07/02/22 0437 07/01/22  1640 07/01/22  1636 06/30/22 2258 06/30/22  0503 06/29/22  0353   WBC 7.3  --   --  8.4 7.0 7.1   HGB 8.9* 9.1* 9.5* 8.3* 8.2* 8.4*     --   --  329 340 305       Basic Metabolic Panel  Recent Labs   Lab 07/02/22 0437 06/30/22 2258 06/30/22 0401 06/29/22 2006 06/29/22  0358 06/29/22  0353   * 130* 135*  --   --  130*   POTASSIUM 4.2 3.9 4.0 4.0  --  4.3   CHLORIDE 99 101 102  --   --  100   CO2 17* 18* 22  --   --  21*   BUN 14.0 13.3 10.3  --   --  9.7   CR 0.77 0.65* 0.66*  --   --  0.63*   GLC 96 106* 98  --  88 89       Liver Function Tests  Recent Labs   Lab 07/02/22 0437 07/01/22 0405 06/30/22 2258 06/30/22  0401 06/29/22  0353   AST 42  --  26 26 30   ALT 72*  --  57* 69* 87*   ALKPHOS 150*  --  110 122 132*   BILITOTAL 0.3  --  0.3 0.3 0.4   ALBUMIN 3.2*  --  2.8* 2.9* 3.0*   INR 1.19* 1.12  --  1.13 1.10       Pancreatic Enzymes  No lab results found in last 7 days.    Coagulation Profile  Recent Labs   Lab 07/02/22 0437 07/01/22 0405 06/30/22 0401 06/29/22  0353   INR 1.19* 1.12 1.13 1.10       IMAGING:  Recent Results (from the past 24 hour(s))   Cardiac Catheterization    Narrative      Right sided filling pressures are normal.    Moderately elevated pulmonary artery hypertension.    Left sided filling pressures are moderately elevated.    Reduced cardiac output level.    Hemodynamic data has been modified in Epic per physician review.     1.  Moderate pulmonary hypertension  2.  Reduced  cardiac output index 2.2 by TD CO and 2.06 by Marley while on   IABP support  3.  Moderately elevated left-sided filling pressures, normal right-sided   filling pressures     XR Chest Port 1 View   Result Value    Radiologist flags Mildly retracted IABP (Urgent)    Narrative    Chest radiograph  7/2/2022 1:58 AM      HISTORY: IABP position    COMPARISON: 7/1/2022, 6/30/2022    FINDINGS:   Single AP view of the chest. IABP marker near the level of the paola,  more medial than on prior exams, now projecting over the aortic arch.  Right axillary vascular access cannula with adjacent clips. Left chest  wall ICD with leads in similar position. Right arm PICC tip in the low  SVC. Stable mediastinum. Coronary artery stent. No pneumothorax or  significant pleural effusion. Similar diffuse interstitial and  airspace opacities.      Impression    IMPRESSION:   1. IABP marker near the level of the paola, more medial than on prior  exams and now projecting over the aortic arch, suspicious for  retraction given subclavian approach. Stable remaining devices.  2. Stable pulmonary opacities.    [Urgent Result: Mildly retracted IABP]    Finding was identified on 7/2/2022 1:58 AM.     Dr. Kline was contacted by Dr. Hirsch at 7/2/2022 2:12 AM and  verbalized understanding of the urgent finding.     I have personally reviewed the examination and initial interpretation  and I agree with the findings.    YESENIA KEITH DO         SYSTEM ID:  [id number]

## 2022-07-02 NOTE — PLAN OF CARE
Major Shift Events:  Pt vitally stable on room air. Neuros intact. Good uop, no BM. Slept okay overnight. Tylenol and Oxy given for shoulder and back pain.     Plan: Waiting for transplant vs. LVAD    For vital signs and complete assessments, please see documentation flowsheets.

## 2022-07-03 ENCOUNTER — APPOINTMENT (OUTPATIENT)
Dept: GENERAL RADIOLOGY | Facility: CLINIC | Age: 62
DRG: 001 | End: 2022-07-03
Attending: HOSPITALIST
Payer: COMMERCIAL

## 2022-07-03 ENCOUNTER — APPOINTMENT (OUTPATIENT)
Dept: CT IMAGING | Facility: CLINIC | Age: 62
DRG: 001 | End: 2022-07-03
Attending: STUDENT IN AN ORGANIZED HEALTH CARE EDUCATION/TRAINING PROGRAM
Payer: COMMERCIAL

## 2022-07-03 ENCOUNTER — APPOINTMENT (OUTPATIENT)
Dept: GENERAL RADIOLOGY | Facility: CLINIC | Age: 62
DRG: 001 | End: 2022-07-03
Attending: STUDENT IN AN ORGANIZED HEALTH CARE EDUCATION/TRAINING PROGRAM
Payer: COMMERCIAL

## 2022-07-03 ENCOUNTER — APPOINTMENT (OUTPATIENT)
Dept: GENERAL RADIOLOGY | Facility: CLINIC | Age: 62
DRG: 001 | End: 2022-07-03
Attending: INTERNAL MEDICINE
Payer: COMMERCIAL

## 2022-07-03 LAB
ALBUMIN SERPL BCG-MCNC: 3.2 G/DL (ref 3.5–5.2)
ALP SERPL-CCNC: 115 U/L (ref 40–129)
ALT SERPL W P-5'-P-CCNC: 55 U/L (ref 10–50)
ANION GAP SERPL CALCULATED.3IONS-SCNC: 10 MMOL/L (ref 7–15)
AST SERPL W P-5'-P-CCNC: 31 U/L (ref 10–50)
BASE EXCESS BLDV CALC-SCNC: 0.2 MMOL/L (ref -7.7–1.9)
BILIRUB SERPL-MCNC: 0.3 MG/DL
BUN SERPL-MCNC: 13.3 MG/DL (ref 8–23)
CALCIUM SERPL-MCNC: 8.9 MG/DL (ref 8.8–10.2)
CHLORIDE SERPL-SCNC: 100 MMOL/L (ref 98–107)
CREAT SERPL-MCNC: 0.67 MG/DL (ref 0.67–1.17)
DEPRECATED HCO3 PLAS-SCNC: 22 MMOL/L (ref 22–29)
ERYTHROCYTE [DISTWIDTH] IN BLOOD BY AUTOMATED COUNT: 16.7 % (ref 10–15)
GFR SERPL CREATININE-BSD FRML MDRD: >90 ML/MIN/1.73M2
GLUCOSE SERPL-MCNC: 96 MG/DL (ref 70–99)
HCO3 BLDV-SCNC: 25 MMOL/L (ref 21–28)
HCT VFR BLD AUTO: 27.2 % (ref 40–53)
HGB BLD-MCNC: 8.4 G/DL (ref 13.3–17.7)
INR PPP: 1.16 (ref 0.85–1.15)
MAGNESIUM SERPL-MCNC: 2 MG/DL (ref 1.7–2.3)
MCH RBC QN AUTO: 28.5 PG (ref 26.5–33)
MCHC RBC AUTO-ENTMCNC: 30.9 G/DL (ref 31.5–36.5)
MCV RBC AUTO: 92 FL (ref 78–100)
O2/TOTAL GAS SETTING VFR VENT: 21 %
OXYHGB MFR BLDV: 57 % (ref 70–75)
PCO2 BLDV: 39 MM HG (ref 40–50)
PH BLDV: 7.41 [PH] (ref 7.32–7.43)
PLATELET # BLD AUTO: 381 10E3/UL (ref 150–450)
PO2 BLDV: 34 MM HG (ref 25–47)
POTASSIUM SERPL-SCNC: 4.1 MMOL/L (ref 3.4–5.3)
PROT SERPL-MCNC: 6.3 G/DL (ref 6.4–8.3)
RBC # BLD AUTO: 2.95 10E6/UL (ref 4.4–5.9)
SODIUM SERPL-SCNC: 132 MMOL/L (ref 136–145)
UFH PPP CHRO-ACNC: 0.44 IU/ML
WBC # BLD AUTO: 5.8 10E3/UL (ref 4–11)

## 2022-07-03 PROCEDURE — 999N000155 HC STATISTIC RAPCV CVP MONITORING

## 2022-07-03 PROCEDURE — 99152 MOD SED SAME PHYS/QHP 5/>YRS: CPT | Performed by: INTERNAL MEDICINE

## 2022-07-03 PROCEDURE — 74176 CT ABD & PELVIS W/O CONTRAST: CPT | Mod: 26 | Performed by: RADIOLOGY

## 2022-07-03 PROCEDURE — 71045 X-RAY EXAM CHEST 1 VIEW: CPT | Mod: 26 | Performed by: RADIOLOGY

## 2022-07-03 PROCEDURE — 33999 UNLISTED PX CARDIAC SURGERY: CPT | Performed by: INTERNAL MEDICINE

## 2022-07-03 PROCEDURE — 250N000013 HC RX MED GY IP 250 OP 250 PS 637: Performed by: STUDENT IN AN ORGANIZED HEALTH CARE EDUCATION/TRAINING PROGRAM

## 2022-07-03 PROCEDURE — 93451 RIGHT HEART CATH: CPT | Performed by: INTERNAL MEDICINE

## 2022-07-03 PROCEDURE — 999N000075 HC STATISTIC IABP MONITORING

## 2022-07-03 PROCEDURE — 999N000185 HC STATISTIC TRANSPORT TIME EA 15 MIN

## 2022-07-03 PROCEDURE — 80053 COMPREHEN METABOLIC PANEL: CPT | Performed by: STUDENT IN AN ORGANIZED HEALTH CARE EDUCATION/TRAINING PROGRAM

## 2022-07-03 PROCEDURE — 99291 CRITICAL CARE FIRST HOUR: CPT | Mod: 25 | Performed by: INTERNAL MEDICINE

## 2022-07-03 PROCEDURE — 85027 COMPLETE CBC AUTOMATED: CPT | Performed by: STUDENT IN AN ORGANIZED HEALTH CARE EDUCATION/TRAINING PROGRAM

## 2022-07-03 PROCEDURE — 71250 CT THORAX DX C-: CPT | Mod: 26 | Performed by: RADIOLOGY

## 2022-07-03 PROCEDURE — C1769 GUIDE WIRE: HCPCS | Performed by: INTERNAL MEDICINE

## 2022-07-03 PROCEDURE — 250N000011 HC RX IP 250 OP 636: Performed by: STUDENT IN AN ORGANIZED HEALTH CARE EDUCATION/TRAINING PROGRAM

## 2022-07-03 PROCEDURE — 200N000002 HC R&B ICU UMMC

## 2022-07-03 PROCEDURE — 71250 CT THORAX DX C-: CPT

## 2022-07-03 PROCEDURE — 272N000001 HC OR GENERAL SUPPLY STERILE: Performed by: INTERNAL MEDICINE

## 2022-07-03 PROCEDURE — 85610 PROTHROMBIN TIME: CPT | Performed by: INTERNAL MEDICINE

## 2022-07-03 PROCEDURE — 86160 COMPLEMENT ANTIGEN: CPT | Performed by: STUDENT IN AN ORGANIZED HEALTH CARE EDUCATION/TRAINING PROGRAM

## 2022-07-03 PROCEDURE — 71045 X-RAY EXAM CHEST 1 VIEW: CPT

## 2022-07-03 PROCEDURE — 5A02210 ASSISTANCE WITH CARDIAC OUTPUT USING BALLOON PUMP, CONTINUOUS: ICD-10-PCS | Performed by: INTERNAL MEDICINE

## 2022-07-03 PROCEDURE — 250N000013 HC RX MED GY IP 250 OP 250 PS 637: Performed by: INTERNAL MEDICINE

## 2022-07-03 PROCEDURE — 85520 HEPARIN ASSAY: CPT | Performed by: INTERNAL MEDICINE

## 2022-07-03 PROCEDURE — 83735 ASSAY OF MAGNESIUM: CPT | Performed by: INTERNAL MEDICINE

## 2022-07-03 PROCEDURE — 71045 X-RAY EXAM CHEST 1 VIEW: CPT | Mod: 77

## 2022-07-03 PROCEDURE — 999N000065 XR CHEST PORT 1 VIEW

## 2022-07-03 PROCEDURE — 33993 REPOSG PERQ R/L HRT VAD: CPT | Mod: GC | Performed by: INTERNAL MEDICINE

## 2022-07-03 PROCEDURE — 250N000013 HC RX MED GY IP 250 OP 250 PS 637

## 2022-07-03 PROCEDURE — 82805 BLOOD GASES W/O2 SATURATION: CPT | Performed by: STUDENT IN AN ORGANIZED HEALTH CARE EDUCATION/TRAINING PROGRAM

## 2022-07-03 PROCEDURE — 36592 COLLECT BLOOD FROM PICC: CPT | Performed by: STUDENT IN AN ORGANIZED HEALTH CARE EDUCATION/TRAINING PROGRAM

## 2022-07-03 PROCEDURE — 250N000011 HC RX IP 250 OP 636: Performed by: INTERNAL MEDICINE

## 2022-07-03 RX ORDER — FERROUS SULFATE 325(65) MG
325 TABLET ORAL DAILY
Status: DISCONTINUED | OUTPATIENT
Start: 2022-07-04 | End: 2022-07-08

## 2022-07-03 RX ORDER — MAGNESIUM OXIDE 400 MG/1
400 TABLET ORAL EVERY 4 HOURS
Status: COMPLETED | OUTPATIENT
Start: 2022-07-03 | End: 2022-07-03

## 2022-07-03 RX ORDER — FENTANYL CITRATE 50 UG/ML
INJECTION, SOLUTION INTRAMUSCULAR; INTRAVENOUS
Status: DISCONTINUED | OUTPATIENT
Start: 2022-07-03 | End: 2022-07-03 | Stop reason: HOSPADM

## 2022-07-03 RX ADMIN — HEPARIN SODIUM 1350 UNITS/HR: 10000 INJECTION, SOLUTION INTRAVENOUS at 18:16

## 2022-07-03 RX ADMIN — SALINE NASAL SPRAY 2 SPRAY: 1.5 SOLUTION NASAL at 20:08

## 2022-07-03 RX ADMIN — Medication 400 MG: at 08:22

## 2022-07-03 RX ADMIN — Medication 10 MG: at 20:07

## 2022-07-03 RX ADMIN — OXYCODONE HYDROCHLORIDE 5 MG: 5 TABLET ORAL at 23:45

## 2022-07-03 RX ADMIN — Medication 400 MG: at 12:27

## 2022-07-03 RX ADMIN — FLUTICASONE FUROATE AND VILANTEROL TRIFENATATE 1 PUFF: 100; 25 POWDER RESPIRATORY (INHALATION) at 08:22

## 2022-07-03 RX ADMIN — TAMSULOSIN HYDROCHLORIDE 0.4 MG: 0.4 CAPSULE ORAL at 08:22

## 2022-07-03 RX ADMIN — DULOXETINE 30 MG: 30 CAPSULE, DELAYED RELEASE ORAL at 08:22

## 2022-07-03 RX ADMIN — SENNOSIDES AND DOCUSATE SODIUM 2 TABLET: 8.6; 5 TABLET ORAL at 20:08

## 2022-07-03 RX ADMIN — TRAZODONE HYDROCHLORIDE 100 MG: 50 TABLET ORAL at 20:06

## 2022-07-03 RX ADMIN — SENNOSIDES AND DOCUSATE SODIUM 2 TABLET: 8.6; 5 TABLET ORAL at 08:22

## 2022-07-03 RX ADMIN — SALINE NASAL SPRAY 2 SPRAY: 1.5 SOLUTION NASAL at 12:27

## 2022-07-03 RX ADMIN — HYDROXYCHLOROQUINE SULFATE 200 MG: 200 TABLET, FILM COATED ORAL at 08:22

## 2022-07-03 RX ADMIN — HYDROXYCHLOROQUINE SULFATE 200 MG: 200 TABLET, FILM COATED ORAL at 20:07

## 2022-07-03 RX ADMIN — ASPIRIN 81 MG CHEWABLE TABLET 81 MG: 81 TABLET CHEWABLE at 08:22

## 2022-07-03 RX ADMIN — POLYETHYLENE GLYCOL 3350 17 G: 17 POWDER, FOR SOLUTION ORAL at 08:22

## 2022-07-03 RX ADMIN — SALINE NASAL SPRAY 2 SPRAY: 1.5 SOLUTION NASAL at 15:30

## 2022-07-03 RX ADMIN — HYDROXYZINE HYDROCHLORIDE 25 MG: 25 TABLET, FILM COATED ORAL at 01:55

## 2022-07-03 RX ADMIN — THIAMINE HCL TAB 100 MG 100 MG: 100 TAB at 08:22

## 2022-07-03 RX ADMIN — SALINE NASAL SPRAY 2 SPRAY: 1.5 SOLUTION NASAL at 08:22

## 2022-07-03 ASSESSMENT — ACTIVITIES OF DAILY LIVING (ADL)
ADLS_ACUITY_SCORE: 28

## 2022-07-03 NOTE — PLAN OF CARE
Major Shift Events:  pt went to cath lab to reposition IABP. Full body CT done. Pt up in the chair most of the day.    Plan: LVAD Friday if no heart becomes available before then.  For vital signs and complete assessments, please see documentation flowsheets.

## 2022-07-03 NOTE — PROGRESS NOTES
[facility]    ICU Progress Note       Date of Admission:  6/17/2022    Assessment: Critical Care   61-year-old man with history of ischemic cardiomyopathy LVEF 15%) NYHA class IV symptoms ACC stage D, multiple admissions with heart failure over the past several months. Presented with cardiogenic shock c/b multi-organ failure (JOSE; Acute liver injury/shock liver) requiring mechanical hemodynamic support. Now listed as status 2 for OHT. Course c/b large epistaxis 6/22 with 400 ml blood. Had subclavian IABP placed instead of femoral IABP 6/23/22, adjusted 7/3/22 due to coiling in aortic arch that was asymptomatic and without pulse changes in extremities. Hemoglobin gradually downtrending though recently stable without e/o further e/o bleeding. Continue to optimize his hemodynamics while awaiting LVAD/transplant.     Updates:  - PRA returned elevated at 97% (very high likelihood of acute rejection)  - Ongoing discussion with immunology regarding any potential reasons for particularly high PRA, suggested oncology consult for evaluation of possible malignancy causing polyclonal activation   - Oncology consulted with above question  - CT Chest/Abdomen/Pelvis as part of initial malignancy evaluation without any clear malignant changes  - Tentatively LVAD later this week   - IABP coiled in aortic arch, upper extremity pulses palpated and no mental status changes. Adjusted in the cath lab today.    Plan: Critical Care      ===============  CARDIOVASCULAR  ===============  # Ambulatory cardiogenic shock SCAI D  # Advanced ischemic cardiomyopathy, Class IV, Stage D  # s/p CRT-D (Medtronic 2006, gen change 2012)  # SVT  # Nonsustained VT  # CAD s/p PCI to LAD (2005)  # Hx of MI (2007)  # Severe ostial LAD disease with in-stent restenosis of mid LAD at diag bifurcation, severe proximal RCA disease, mild circ disease now s/p ostial LAD and proximal RCA lithotripsy and stenting on  5/24/22  CMRI showing infarcted myocardium in LAD territory. Given the degree of LV dysfunction/dilation, moderate to severe functional MR and lack of viable myocardium, cardiac surgery would be high risk. Now s/p ostial LAD and proximal RCA lithotripsy and stenting on 5/24/22. RHC 7/1 to reassess hemodynamics, results as below. Being evaluated for transplant/LVAD, PRA returned with reaction with 97% of antibodies in panel, high risk of acute rejection. Ongoing discussion with immunology regarding any potential reasons for particularly high PRA, suggested oncology consult for evaluation of possible malignancy causing polyclonal activation. CT Chest/Abdomen/Pelvis without any evidence of new malignancy. Oncology consulted.     RHC 7/1:  RA 6  RV 52/7  PA 53/26 (35)  PCWP 22  [place]ley CO 3.63, CI 2.06,   TD CO 3.87, CI 2.19  PVR by TD CO 3.3    - Mechanical support: IABP 1:1, continuing (placed 6/23/22)   > IABP coiled in aortic arch 7/3, upper extremity pulses palpated and no mental status changes. Adjusted in the cath lab today.  - Anticoagulation: heparin gtt DVT/PE in setting of APL  - Volume status: euvolemic, no diuresis today   - BB: contraindicated  - ACEi/ARB/ARNI: none  - MRA: none  - SGLT2i: none  - SCD prophylaxis: [employer]tr[employer] CRT-D     LVAD/transplant workup started, discussion with patient:  - Echocardiogram done  - Cardiopulmonary stress testing ordered  - Infectious labs: Quant Gold (-), VZV IgG (+), Treponema Abs w/reflex RPR/titer (-), Toxoplasma IgG (-), Hepatitis serology (all negative, HepB nonimmune), HSV1/2 IgG (-), HIV combo (-), EBV/CMV IgG (both positive)  - Other labs: blood type (O positive), iron studies, prealbumin 22, UA done, TSH 1.65, PRA single antigen IgG antibody (97%)  - Substance abuse screen: PEth (-), UDS (-)  - RHC done, current hemodynamic monitoring  - Imaging: CT Chest A/P with Chronic interstitial subpleural fibrotic disease with paraseptal emphysematous change, US abdomen  complete w/doppler , US JOE Doppler, US LE arterial duplex b/l all unremarkable  - PFTs, 6 Minute Walk Test not done  - Consults: Dental, CVTS, Palliative Care, Nutrition, Neuropsych, Social Work Dental consult  - CVTS consulted    ===========   PULMONARY  ===========  # Mild-Moderate Emphysema  # Hx of COVID-19  Former smoker 2 ppd for 40 years quit on 09/09/09.  - Appreciate pulmonary consultation given emphysema  - ILD panel:                           - AAT 1 level: in process                          - autoimmune work- up:                                      - LASHANDA positive                                      - Anti Helen-1 IgG negative                                      - SSA RO, SSB LA Ab negative                                      - Scleroderma ab scl 70 negative                                      - RF negative                                      - anti CCP 1.7                                      - ANCA IgG < 1:10                                      - Aldolase: 19.6(elevated)              - no need for PFTs with DLCO                                     - if symptoms are considered related to pulm findings: inhaler therapy with LABA/LAMA combination              - will need outpatient pulmonary follow up with serial PFT hs- CT scan with inspiration and expiration views     =====  Renal  =====  -Strict I&O while diuresing   -Monitor Electrolytes while actively diuresing K->4 Mg->2     ===================   GASTROENTEROLOGY  ===================  # Severe malnutrition in the context of chronic illness  # Liver injury, in the setting of cardiogenic shock  # GERD  - monitor  - Diuresis as above  - Supplements to help maintain nutrition.  - Caloric count      ====  CNS  ====  # Depression  # Anxiety due to medical condition  # Insomnia  - Hydroxyzine prn for anxiety  - Melatonin 10 mg prn     # Pain  - oxycodone 5 mg PO prn q6h     ========  Endocrine  ========  TSH WNL HBA1C 5.5   -Continue to monitor FS  while in ICU, Insulin GTT as needed     ============   HEMATOLOGY  ============  # Hx of DVT and PE  # APL  Was on chronic warfarin.  - continue low intensity heparin gtt     # Anemia, acute on chronic  # Iron deficiency  Baseline hgb 8-9. Started to downtrend since nose bleed 6/22. Gradually downtrending without signs of new or ongoing bleeding. Found to have iron deficiency.   - defer transfusion at this time; goal Hgb 6.5              - transfuse 2 units if hgb < 6.5, nonirradiated  - S/p iron IV *3 doses [id number]  - Restart ferrous sulfate 325 mg daily  - continue pediatric tubes for labs, limited blood draw  - monitor for signs of bleeding     # Profuse epistaxis from the left nare with bloody emesis  ~ 400 ml of bloody emesis in the blue bag on arrival. Also had one severe epistaxis that occurred at [place] requiring cauterization.  - Left nasal cavity packed with all absorbable packing - surgifoam + surgicel + surgiflo. No antibiotics are indicated due to absorbable packing.  - saline nasal pray q3h  - if begins to  in quantity of bleeding would recommend spraying the nasal cavity copiously with afrin and holding pressure over the soft part of the nose for 20 minutes continuously. Nasal clips are ineffective and should not be used in place of digital pressure. If bleeding continues despite these measures, repeat. If bleeding continues or is severe please contact ENT.     ==   ID  ==  ROHIT     ===============  RHEUMATOLOGY  ===============  #Lupus  # APL  - Continue pta hydroxychloroquine 200mg bid   - PTA mycophenolic acid 1500mg q12h on hold in anticipation for LVAD  -- Holding MMF one week prior to surgery and re-starting 5-7 days after LVAD placement in the absence of surgical site infection, poor wound healing or infection elsewhere.     ===    ===  - Continue pta tamsulosin 0.4mg daily      Lines/ tubes/ drains:  Fitzgerald Catheter: Not present  Central Lines: PRESENT  PICC Triple Lumen 06/17/22  Right Brachial vein medial-Site Assessment: WDL    Prophylaxis:  - DVT Prophylaxis: heparin gtt (on for DVT/PE in setting of APL)  - PUD Prophylaxis: pantoprazole    Code Status: Full Code      Disposition:  - ICU    Staffed with Dr. Terry Luciano MD  [facility] [place]  Securely message with the Vocera Web Console (learn more here)  Text page via [employer] Paging/Directory         Clinically Significant Risk Factors Present on Admission                       ______________________________________________________________________    Interval History   No symptomatic changes, resting well this morning without new lightheadedness, headache, arm pain or numbness.    Physical Exam   Vital Signs: Temp: 97.7  F (36.5  C) Temp src: Axillary BP: 92/69 Pulse: 91   Resp: 23 SpO2: 95 % O2 Device: None (Room air)    Weight: 146 lbs 3.2 oz  General Appearance: In no distress, comfortable appearing, sitting up in chair  Respiratory: clear to auscultation bilaterally  Cardiovascular: RRR, extremities warm, no peripheral edema  GI: soft  Skin: no rashes. Mild pain to palpation around R shoulder insertion site of IABP, dried blood underneath dressing     Data   I reviewed all medications, new labs and imaging results over the last 24 hours.  Arterial Blood Gases   No lab results found in last 7 days.    Complete Blood Count   Recent Labs   Lab 07/03/22  0307 07/02/22  0437 07/01/22  1640 07/01/22  1636 06/30/22  2258 06/30/22  0503   WBC 5.8 7.3  --   --  8.4 7.0   HGB 8.4* 8.9* 9.1* 9.5* 8.3* 8.2*    369  --   --  329 340       Basic Metabolic Panel  Recent Labs   Lab 07/03/22  0307 07/02/22  2121 07/02/22  0437 06/30/22  2258   * 132* 130* 130*   POTASSIUM 4.1 4.2 4.2 3.9   CHLORIDE 100 100 99 101   CO2 22 22 17* 18*   BUN 13.3 13.9 14.0 13.3   CR 0.67 0.66* 0.77 0.65*   GLC 96 118* 96 106*       Liver Function Tests  Recent Labs   Lab 07/03/22  0307 07/02/22  0434  07/01/22  0405 06/30/22  2258 06/30/22  0401   AST 31 42  --  26 26   ALT 55* 72*  --  57* 69*   ALKPHOS 115 150*  --  110 122   BILITOTAL 0.3 0.3  --  0.3 0.3   ALBUMIN 3.2* 3.2*  --  2.8* 2.9*   INR 1.16* 1.19* 1.12  --  1.13       Pancreatic Enzymes  No lab results found in last 7 days.    Coagulation Profile  Recent Labs   Lab 07/03/22  0307 07/02/22  0437 07/01/22  0405 06/30/22  0401   INR 1.16* 1.19* 1.12 1.13       IMAGING:  Recent Results (from the past 24 hour(s))   XR Chest Port 1 View    Narrative    XR CHEST PORT 1 VIEW  7/3/2022 1:35 AM      HISTORY: IABP position    COMPARISON: 7/2/2022    FINDINGS:   Single AP view of the chest. Stable left chest wall ICD leads. Right  arm PICC tip at the mid SVC. Superior IABP marker approximately 1.4 cm  above the level of the paola. Stable mediastinum. Coronary artery  stents. No pneumothorax or significant pleural effusion. Similar  diffuse interstitial opacities with decreased bibasilar streaky  atelectasis.      Impression    IMPRESSION:   1. Superior IABP marker approximately 1.4 cm above the level of the  paola but likely located within the proximal to mid aortic arch.   2. Mildly retracted right arm PICC tip at the mid SVC.   3. Stable probable mild pulmonary edema with decreased basilar  atelectasis.    I have personally reviewed the examination and initial interpretation  and I agree with the findings.    YESENIA KEITH DO         SYSTEM ID:  [id number]   XR Chest Port 1 View    Narrative    Exam: XR CHEST PORT 1 VIEW, 7/3/2022 11:58 AM    Indication: balloon pump position    Comparison: Same-day    Findings:   Intra-aortic balloon pump superior marker projecting slightly more  lateral than on the prior study, located 1.3 cm above the paola. The  inferior marker projects at the level of T12, slightly lower than  before. Stable left chest wall implantable cardiac defibrillator.  Right upper extremity PICC tip at the mid to low SVC. Right  axillary  vascular access cannula with adjacent surgical clips. Coronary artery  stents. Stable cardiomediastinal silhouette. The pulmonary vasculature  is indistinct. Stable bilateral mixed interstitial and patchy airspace  opacities. Probable trace bilateral pleural effusions. No  pneumothorax.      Impression    Impression: Intra-aortic balloon pump superior marker projects  slightly more lateral than on the prior study and the inferior marker  projects slightly lower than before, may be projectional (with the  patient rotated slightly more to the left) or related to slight  repositioning. The superior marker is located 1.3 cm above the level  of the paola and could be in the aortic arch or proximal descending  thoracic aorta.    YESENIA KETIH DO         SYSTEM ID:  [id number]   CT Chest Abdomen Pelvis w/o Contrast    Narrative    EXAMINATION: CT CHEST ABDOMEN PELVIS W/O CONTRAST, 7/3/2022 12:27 PM    INDICATION: Elevated panel of reactive antibodies, ?malignancy. Prefer  no contrast, prior to heart transplant/VAD    COMPARISON STUDY: CT 5/19/2022. Radiograph earlier same day.    TECHNIQUE: CT scan of the chest, abdomen, and pelvis was performed on  multidetector CT scanner using volumetric acquisition technique and  images were reconstructed in multiple planes with variable thickness  and reviewed on dedicated workstations. Noncontrast exam.    CT scan radiation dose is optimized to minimum requisite dose using  automated dose modulation techniques.    FINDINGS:    Lines, tubes, devices: Left chest wall implantable cardiac  defibrillator and leads in stable positioning. Right upper extremity  PICC tip at the mid to low SVC. Intra-aortic balloon pump superior  marker within the proximal to mid aortic arch. IVC filter in place.  CHEST:      Lungs: Small right and trace left pleural effusions. Diffuse smooth  interlobular septal thickening with regional groundglass opacities. No  suspicious focal pulmonary nodule.  Moderate apically predominant  paraseptal and centrilobular emphysematous changes. Chronic  interstitial and subpleural fibrotic disease. The central  tracheobronchial tree is patent.      Mediastinum: The visualized thyroid is unremarkable. Cardiomegaly.  Left ventricular subendocardial fat deposition and calcification  related to prior myocardial infarction. No pericardial effusion.  Coronary artery stenting. Normal caliber ascending aorta. Dilated main  pulmonary artery, measuring up to 3.4 cm. Standard branching pattern  of the great vessels. No abnormal thoracic lymph nodes.      ABDOMEN/PELVIS:    Liver: No mass. No intrahepatic biliary ductal dilation.  No focal  suspicious hepatic lesion.    Biliary System: Layering density within the bladder, favoring biliary  sludge. No extrahepatic biliary ductal dilation.    Pancreas: No mass or pancreatic ductal dilation.    Adrenal glands: No mass or nodules    Spleen: Calcified granuloma in the superior aspect of the spleen.    Kidneys: No suspicious mass, obstructing calculus or hydronephrosis.    Gastrointestinal tract : Normal caliber small bowel.  Colonic  diverticulosis with no diverticulitis.    Mesentery/peritoneum/retroperitoneum: Trace simple fluid about the  spleen.    Lymph nodes: No significant lymphadenopathy.    Vasculature: Patent major abdominal vasculature.    Pelvis: The urinary bladder is moderately distended and otherwise  unremarkable.  Prostate and seminal vesicles are within normal limits.    Osseous structures: Lucencies of the bilateral femoral heads without  evidence of collapse.  Mild degenerative changes of the spine.      Soft tissues: Associated with the right subclavian artery approach  intra-aortic balloon pump is an ill-defined 3.7 x 2.8 cm complex  collection containing foci of gas and fluid within the right  pectoralis musculature and inflammatory changes extending  superficially.      Impression    IMPRESSION:  1. No evidence of  malignancy in the chest, abdomen, or pelvis.  2. Right subclavian artery approach intra-aortic balloon pump superior  marker within the proximal to mid aortic arch.  3. Right moderate and left trace pleural effusions.  4. No significant change in chronic interstitial and subpleural  fibrotic changes.  5. Diffuse interlobular septal thickening and diffuse groundglass  opacities favor pulmonary interstitial edema although infection cannot  be entirely excluded.  6. Dilated main pulmonary artery, suggesting pulmonary hypertension.  7. Cardiomegaly with chronic sequela of prior myocardial infarction.  8. 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    I have personally reviewed the examination and initial interpretation  and I agree with the findings.    YESENIA KEITH DO         SYSTEM ID:  [id number]   Cardiac Catheterization    Narrative      Subclavian IABP advanced distal to aortic arch under fluoroscopy.

## 2022-07-04 ENCOUNTER — APPOINTMENT (OUTPATIENT)
Dept: GENERAL RADIOLOGY | Facility: CLINIC | Age: 62
DRG: 001 | End: 2022-07-04
Attending: INTERNAL MEDICINE
Payer: COMMERCIAL

## 2022-07-04 ENCOUNTER — DOCUMENTATION ONLY (OUTPATIENT)
Dept: TRANSPLANT | Facility: CLINIC | Age: 62
End: 2022-07-04

## 2022-07-04 LAB
ALBUMIN SERPL BCG-MCNC: 3.2 G/DL (ref 3.5–5.2)
ALBUMIN SERPL BCG-MCNC: NORMAL G/DL
ALP SERPL-CCNC: 111 U/L (ref 40–129)
ALP SERPL-CCNC: NORMAL U/L
ALT SERPL W P-5'-P-CCNC: 42 U/L (ref 10–50)
ALT SERPL W P-5'-P-CCNC: NORMAL U/L
ANION GAP SERPL CALCULATED.3IONS-SCNC: 11 MMOL/L (ref 7–15)
ANION GAP SERPL CALCULATED.3IONS-SCNC: 4 MMOL/L (ref 3–14)
ANION GAP SERPL CALCULATED.3IONS-SCNC: NORMAL MMOL/L
AST SERPL W P-5'-P-CCNC: 24 U/L (ref 10–50)
AST SERPL W P-5'-P-CCNC: NORMAL U/L
BASE EXCESS BLDV CALC-SCNC: -0.8 MMOL/L (ref -7.7–1.9)
BASE EXCESS BLDV CALC-SCNC: -1 MMOL/L (ref -7.7–1.9)
BILIRUB SERPL-MCNC: 0.3 MG/DL
BILIRUB SERPL-MCNC: NORMAL MG/DL
BUN SERPL-MCNC: 11.8 MG/DL (ref 8–23)
BUN SERPL-MCNC: 13 MG/DL (ref 7–30)
BUN SERPL-MCNC: NORMAL MG/DL
CALCIUM SERPL-MCNC: 8.9 MG/DL (ref 8.8–10.2)
CALCIUM SERPL-MCNC: 9.4 MG/DL (ref 8.5–10.1)
CALCIUM SERPL-MCNC: NORMAL MG/DL
CHLORIDE BLD-SCNC: 106 MMOL/L (ref 94–109)
CHLORIDE SERPL-SCNC: 94 MMOL/L (ref 98–107)
CHLORIDE SERPL-SCNC: NORMAL MMOL/L
CO2 SERPL-SCNC: 24 MMOL/L (ref 20–32)
CREAT SERPL-MCNC: 0.61 MG/DL (ref 0.66–1.25)
CREAT SERPL-MCNC: 0.62 MG/DL (ref 0.67–1.17)
CREAT SERPL-MCNC: NORMAL MG/DL
DEPRECATED HCO3 PLAS-SCNC: 20 MMOL/L (ref 22–29)
DEPRECATED HCO3 PLAS-SCNC: NORMAL MMOL/L
ERYTHROCYTE [DISTWIDTH] IN BLOOD BY AUTOMATED COUNT: 16.3 % (ref 10–15)
ERYTHROCYTE [DISTWIDTH] IN BLOOD BY AUTOMATED COUNT: 16.3 % (ref 10–15)
GFR SERPL CREATININE-BSD FRML MDRD: >90 ML/MIN/1.73M2
GFR SERPL CREATININE-BSD FRML MDRD: >90 ML/MIN/1.73M2
GFR SERPL CREATININE-BSD FRML MDRD: NORMAL ML/MIN/{1.73_M2}
GLUCOSE BLD-MCNC: 116 MG/DL (ref 70–99)
GLUCOSE SERPL-MCNC: 86 MG/DL (ref 70–99)
GLUCOSE SERPL-MCNC: NORMAL MG/DL
HCO3 BLDV-SCNC: 24 MMOL/L (ref 21–28)
HCO3 BLDV-SCNC: 24 MMOL/L (ref 21–28)
HCT VFR BLD AUTO: 27.6 % (ref 40–53)
HCT VFR BLD AUTO: 33.2 % (ref 40–53)
HGB BLD-MCNC: 10.4 G/DL (ref 13.3–17.7)
HGB BLD-MCNC: 8.4 G/DL (ref 13.3–17.7)
INR PPP: 1.26 (ref 0.85–1.15)
MAGNESIUM SERPL-MCNC: 1.3 MG/DL (ref 1.7–2.3)
MAGNESIUM SERPL-MCNC: 3 MG/DL (ref 1.6–2.3)
MCH RBC QN AUTO: 28.4 PG (ref 26.5–33)
MCH RBC QN AUTO: 28.9 PG (ref 26.5–33)
MCHC RBC AUTO-ENTMCNC: 30.4 G/DL (ref 31.5–36.5)
MCHC RBC AUTO-ENTMCNC: 31.3 G/DL (ref 31.5–36.5)
MCV RBC AUTO: 92 FL (ref 78–100)
MCV RBC AUTO: 93 FL (ref 78–100)
O2/TOTAL GAS SETTING VFR VENT: 21 %
O2/TOTAL GAS SETTING VFR VENT: 21 %
OXYHGB MFR BLDV: 62 % (ref 70–75)
OXYHGB MFR BLDV: 83 % (ref 70–75)
PCO2 BLDV: 40 MM HG (ref 40–50)
PCO2 BLDV: 42 MM HG (ref 40–50)
PH BLDV: 7.38 [PH] (ref 7.32–7.43)
PH BLDV: 7.39 [PH] (ref 7.32–7.43)
PLATELET # BLD AUTO: 283 10E3/UL (ref 150–450)
PLATELET # BLD AUTO: 320 10E3/UL (ref 150–450)
PO2 BLDV: 37 MM HG (ref 25–47)
PO2 BLDV: 54 MM HG (ref 25–47)
POTASSIUM BLD-SCNC: 5.7 MMOL/L (ref 3.4–5.3)
POTASSIUM SERPL-SCNC: 4.6 MMOL/L (ref 3.4–5.3)
POTASSIUM SERPL-SCNC: NORMAL MMOL/L
PROT SERPL-MCNC: 6.2 G/DL (ref 6.4–8.3)
PROT SERPL-MCNC: NORMAL G/DL
RBC # BLD AUTO: 2.96 10E6/UL (ref 4.4–5.9)
RBC # BLD AUTO: 3.6 10E6/UL (ref 4.4–5.9)
SODIUM SERPL-SCNC: 125 MMOL/L (ref 136–145)
SODIUM SERPL-SCNC: 134 MMOL/L (ref 133–144)
SODIUM SERPL-SCNC: NORMAL MMOL/L
UFH PPP CHRO-ACNC: 0.25 IU/ML
WBC # BLD AUTO: 4.3 10E3/UL (ref 4–11)
WBC # BLD AUTO: 5.8 10E3/UL (ref 4–11)

## 2022-07-04 PROCEDURE — 71045 X-RAY EXAM CHEST 1 VIEW: CPT | Mod: 26 | Performed by: RADIOLOGY

## 2022-07-04 PROCEDURE — 82805 BLOOD GASES W/O2 SATURATION: CPT | Performed by: STUDENT IN AN ORGANIZED HEALTH CARE EDUCATION/TRAINING PROGRAM

## 2022-07-04 PROCEDURE — 250N000011 HC RX IP 250 OP 636: Performed by: HOSPITALIST

## 2022-07-04 PROCEDURE — 83735 ASSAY OF MAGNESIUM: CPT | Performed by: INTERNAL MEDICINE

## 2022-07-04 PROCEDURE — 999N000075 HC STATISTIC IABP MONITORING

## 2022-07-04 PROCEDURE — 80053 COMPREHEN METABOLIC PANEL: CPT | Performed by: HOSPITALIST

## 2022-07-04 PROCEDURE — 250N000011 HC RX IP 250 OP 636: Performed by: STUDENT IN AN ORGANIZED HEALTH CARE EDUCATION/TRAINING PROGRAM

## 2022-07-04 PROCEDURE — 250N000013 HC RX MED GY IP 250 OP 250 PS 637: Performed by: HOSPITALIST

## 2022-07-04 PROCEDURE — 99207 PR NO CHARGE LOS: CPT | Performed by: INTERNAL MEDICINE

## 2022-07-04 PROCEDURE — 250N000013 HC RX MED GY IP 250 OP 250 PS 637: Performed by: STUDENT IN AN ORGANIZED HEALTH CARE EDUCATION/TRAINING PROGRAM

## 2022-07-04 PROCEDURE — 71045 X-RAY EXAM CHEST 1 VIEW: CPT

## 2022-07-04 PROCEDURE — 200N000002 HC R&B ICU UMMC

## 2022-07-04 PROCEDURE — 250N000013 HC RX MED GY IP 250 OP 250 PS 637

## 2022-07-04 PROCEDURE — 85027 COMPLETE CBC AUTOMATED: CPT | Performed by: HOSPITALIST

## 2022-07-04 PROCEDURE — 85520 HEPARIN ASSAY: CPT | Performed by: INTERNAL MEDICINE

## 2022-07-04 PROCEDURE — 99291 CRITICAL CARE FIRST HOUR: CPT | Mod: GC | Performed by: INTERNAL MEDICINE

## 2022-07-04 PROCEDURE — 83051 HEMOGLOBIN PLASMA: CPT | Performed by: PHYSICIAN ASSISTANT

## 2022-07-04 PROCEDURE — 99207 PR NO BILLABLE SERVICE THIS VISIT: CPT | Performed by: HOSPITALIST

## 2022-07-04 PROCEDURE — 250N000013 HC RX MED GY IP 250 OP 250 PS 637: Performed by: INTERNAL MEDICINE

## 2022-07-04 PROCEDURE — 85027 COMPLETE CBC AUTOMATED: CPT | Performed by: STUDENT IN AN ORGANIZED HEALTH CARE EDUCATION/TRAINING PROGRAM

## 2022-07-04 PROCEDURE — 85610 PROTHROMBIN TIME: CPT | Performed by: INTERNAL MEDICINE

## 2022-07-04 RX ORDER — MAGNESIUM SULFATE HEPTAHYDRATE 40 MG/ML
4 INJECTION, SOLUTION INTRAVENOUS ONCE
Status: COMPLETED | OUTPATIENT
Start: 2022-07-04 | End: 2022-07-04

## 2022-07-04 RX ORDER — POTASSIUM CHLORIDE 20MEQ/15ML
60 LIQUID (ML) ORAL ONCE
Status: COMPLETED | OUTPATIENT
Start: 2022-07-04 | End: 2022-07-04

## 2022-07-04 RX ADMIN — HYDROXYCHLOROQUINE SULFATE 200 MG: 200 TABLET, FILM COATED ORAL at 09:36

## 2022-07-04 RX ADMIN — SALINE NASAL SPRAY 2 SPRAY: 1.5 SOLUTION NASAL at 12:12

## 2022-07-04 RX ADMIN — SALINE NASAL SPRAY 2 SPRAY: 1.5 SOLUTION NASAL at 23:03

## 2022-07-04 RX ADMIN — ASPIRIN 81 MG CHEWABLE TABLET 81 MG: 81 TABLET CHEWABLE at 09:36

## 2022-07-04 RX ADMIN — SALINE NASAL SPRAY 2 SPRAY: 1.5 SOLUTION NASAL at 15:08

## 2022-07-04 RX ADMIN — SALINE NASAL SPRAY 2 SPRAY: 1.5 SOLUTION NASAL at 18:41

## 2022-07-04 RX ADMIN — MAGNESIUM SULFATE IN WATER 4 G: 40 INJECTION, SOLUTION INTRAVENOUS at 05:53

## 2022-07-04 RX ADMIN — FLUTICASONE FUROATE AND VILANTEROL TRIFENATATE 1 PUFF: 100; 25 POWDER RESPIRATORY (INHALATION) at 09:38

## 2022-07-04 RX ADMIN — SALINE NASAL SPRAY 2 SPRAY: 1.5 SOLUTION NASAL at 04:21

## 2022-07-04 RX ADMIN — TAMSULOSIN HYDROCHLORIDE 0.4 MG: 0.4 CAPSULE ORAL at 09:36

## 2022-07-04 RX ADMIN — POTASSIUM CHLORIDE 60 MEQ: 40 SOLUTION ORAL at 05:58

## 2022-07-04 RX ADMIN — THIAMINE HCL TAB 100 MG 100 MG: 100 TAB at 09:37

## 2022-07-04 RX ADMIN — SALINE NASAL SPRAY 2 SPRAY: 1.5 SOLUTION NASAL at 02:39

## 2022-07-04 RX ADMIN — HYDROXYZINE HYDROCHLORIDE 25 MG: 25 TABLET, FILM COATED ORAL at 23:04

## 2022-07-04 RX ADMIN — HEPARIN SODIUM 1350 UNITS/HR: 10000 INJECTION, SOLUTION INTRAVENOUS at 15:07

## 2022-07-04 RX ADMIN — HYDROXYCHLOROQUINE SULFATE 200 MG: 200 TABLET, FILM COATED ORAL at 20:38

## 2022-07-04 RX ADMIN — OXYCODONE HYDROCHLORIDE 5 MG: 5 TABLET ORAL at 21:54

## 2022-07-04 RX ADMIN — SENNOSIDES AND DOCUSATE SODIUM 2 TABLET: 8.6; 5 TABLET ORAL at 09:36

## 2022-07-04 RX ADMIN — FERROUS SULFATE TAB 325 MG (65 MG ELEMENTAL FE) 325 MG: 325 (65 FE) TAB at 09:37

## 2022-07-04 RX ADMIN — TRAZODONE HYDROCHLORIDE 100 MG: 50 TABLET ORAL at 20:37

## 2022-07-04 RX ADMIN — ACETAMINOPHEN 975 MG: 325 TABLET, FILM COATED ORAL at 02:40

## 2022-07-04 RX ADMIN — POLYETHYLENE GLYCOL 3350 17 G: 17 POWDER, FOR SOLUTION ORAL at 09:36

## 2022-07-04 RX ADMIN — DULOXETINE 30 MG: 30 CAPSULE, DELAYED RELEASE ORAL at 09:36

## 2022-07-04 RX ADMIN — Medication 10 MG: at 20:38

## 2022-07-04 RX ADMIN — SALINE NASAL SPRAY 2 SPRAY: 1.5 SOLUTION NASAL at 09:37

## 2022-07-04 RX ADMIN — SENNOSIDES AND DOCUSATE SODIUM 2 TABLET: 8.6; 5 TABLET ORAL at 20:38

## 2022-07-04 RX ADMIN — LIDOCAINE PATCH 4% 2 PATCH: 40 PATCH TOPICAL at 23:04

## 2022-07-04 ASSESSMENT — ACTIVITIES OF DAILY LIVING (ADL)
ADLS_ACUITY_SCORE: 28

## 2022-07-04 NOTE — PROGRESS NOTES
Status 2 Heart Extension Complete  7/4/22  Listing Criteria:  Exception    Dr. Kelly Lora approved the following statement prior to the submission of Status 2 Exception form in [facility]:    We are requesting a status 2 extension for our patient.      Candidate is a 61 year old male, blood type O, 5'7'' 66 kg, with severely reduced ejection fraction heart failure secondary to ischemic cardiomyopathy, who is status post intra aortic balloon pump placed 6/17/22. He presented to an outside hospital in cardiogenic shock with LFTs over 1000, requiring dobutamine 7.5 mcg/kg/min and subsequently the IABP that prompted the initial listing.  He has been listed status 2 since 6/20/22.  Despite the IABP, his last hemodynamics 7/1/22 still exhibited a PCWP of 22 mmHg, PA sat 39%, with cardiac index 2.1 L/min/m2, demonstrating persistent low output and cardiogenic shock.    His other pertinent medical history is notable for systemic lupus erythematosus with severe arthritic and cutaneous symptoms, on plaquenil and cellcept 1500mg BID.  We are requesting an extension of his status 2 due to infection risks associated with LVAD placement, specifically driveline infection, in a patient who requires high dose immunosuppression for a systemic disease.      Status 2 Extension due 7/18/22       No complaints

## 2022-07-04 NOTE — PLAN OF CARE
Major Shift Events:  uneventful day, no issues with IABP. Cards 2 MD ok with family bringing in pt dinner from outside tomorrow evening. Pt and daughter also want to talk with LVAD coordinator before surgery. Will pass on to follow up tomorrow.    Plan: follow up with LVAD coordinator to see pt and daughter before surgery.    For vital signs and complete assessments, please see documentation flowsheets.

## 2022-07-04 NOTE — SUMMARY OF CARE
Cards Fellow  came to bedside to assess IABP, he and RN took down dressing to try and get IABP to function properly, found kink in wire inside sheath , tried to straighten and redress but IABP continues to be positional with on and off very dampened waveforms, patient denies symptoms when AUG pressure 40s or 70s, patients systemic b/p has most always has MAP >70.this author is giving x 4 Grams Mag and Potassium 60meq PO.Continue to monitor closely and continue plan of care, MD turning IABP issues to day shift. Patient admits frustration with problems since was originally inserted.

## 2022-07-04 NOTE — PROGRESS NOTES
X Cover    Notified of electrolyte derangement (Mg 1.3; K 3.0); gave 4 g of IV magnesium and 60 mEq of potassium    Pipo Hadley MD PhD

## 2022-07-04 NOTE — PROGRESS NOTES
X Cover    Received several calls from nursing staff that patient's balloon pump was alarming due to not reaching the augmentation index of at least 60 mmHg. Along with nursing staff, examination of the balloon pump appears there is a small kink in the shaft of the balloon that runs along patients sternum. Along with nursing staff, I attempted to reposition it in order to obtain better pump performance with no significant change in augmentation index. Will defer to the day team to obtain a more durable solution.    Pipo Hadley MD PhD

## 2022-07-04 NOTE — PROGRESS NOTES
Patient was moved from chair to bed , able to stand with assist, back in bed patients IABP alarming  Not inflating fully,  Checked all of EKG leads to checking all connections from patient to IABP, Cards Fellow on ordered a xry for placement which was in place per HO read, moving patients HOB up and down , when at 30 degrees at HOB augments, with head lower alarms and does not augment effectively, Cards fellow aware . HOB approx 25 degress so patient can sleep but augments on and off at this time, will monitor ,patient denies any symptoms at this time.Awaiting MD orders at this time.

## 2022-07-04 NOTE — PROGRESS NOTES
[facility]    Cardiology Progress Note        Date of Admission:  6/17/2022     Assessment & Plan: HVSL   61-year-old man with history of ischemic cardiomyopathy LVEF 15%) NYHA class IV symptoms ACC stage D, multiple admissions with heart failure over the past several months. Presented with cardiogenic shock c/b multi-organ failure (JOSE; Acute liver injury/shock liver) requiring mechanical hemodynamic support. Now listed as status 2 for OHT. Course c/b large epistaxis 6/22 with 400 ml blood. Had subclavian IABP placed instead of femoral IABP 6/23/22, adjusted 7/3/22 due to coiling in aortic arch that was asymptomatic and without pulse changes in extremities. Hemoglobin gradually downtrending though recently stable without e/o further e/o bleeding. Continue to optimize his hemodynamics while awaiting LVAD/transplant.     Updates:  - continue IABP at 1:1  - repeat labs  - Tentatively plan for LVAD later this week      Plan: Critical Care      ===============  CARDIOVASCULAR  ===============  # Ambulatory cardiogenic shock SCAI D  # Advanced ischemic cardiomyopathy, Class IV, Stage D  # s/p CRT-D (Medtronic 2006, gen change 2012)  # SVT  # Nonsustained VT  # CAD s/p PCI to LAD (2005)  # Hx of MI (2007)  # Severe ostial LAD disease with in-stent restenosis of mid LAD at diag bifurcation, severe proximal RCA disease, mild circ disease now s/p ostial LAD and proximal RCA lithotripsy and stenting on 5/24/22  CMRI showing infarcted myocardium in LAD territory. Given the degree of LV dysfunction/dilation, moderate to severe functional MR and lack of viable myocardium, cardiac surgery would be high risk. Now s/p ostial LAD and proximal RCA lithotripsy and stenting on 5/24/22. RHC 7/1 to reassess hemodynamics, results as below. Being evaluated for transplant/LVAD, PRA returned with reaction with 97% of antibodies in panel, high risk of acute rejection. Ongoing discussion  with immunology regarding any potential reasons for particularly high PRA, suggested oncology consult for evaluation of possible malignancy causing polyclonal activation. CT Chest/Abdomen/Pelvis without any evidence of new malignancy. Oncology consulted.      RHC 7/1:  RA 6  RV 52/7  PA 53/26 (35)  PCWP 22  [place]ley CO 3.63, CI 2.06,   TD CO 3.87, CI 2.19  PVR by TD CO 3.3     - Mechanical support: IABP 1:1, continuing (placed 6/23/22)               > frequent IABP alarms overnight; appears positional in nature  - Anticoagulation: heparin gtt DVT/PE in setting of APL  - Volume status: euvolemic, no diuresis today   - BB: contraindicated  - ACEi/ARB/ARNI: none  - MRA: none  - SGLT2i: none  - SCD prophylaxis: Medtronic CRT-D      LVAD/transplant workup started, discussion with patient:  - Echocardiogram done  - Cardiopulmonary stress testing ordered  - Infectious labs: Quant Gold (-), VZV IgG (+), Treponema Abs w/reflex RPR/titer (-), Toxoplasma IgG (-), Hepatitis serology (all negative, HepB nonimmune), HSV1/2 IgG (-), HIV combo (-), EBV/CMV IgG (both positive)  - Other labs: blood type (O positive), iron studies, prealbumin 22, UA done, TSH 1.65, PRA single antigen IgG antibody (97%)  - Substance abuse screen: PEth (-), UDS (-)  - RHC done, current hemodynamic monitoring  - Imaging: CT Chest A/P with Chronic interstitial subpleural fibrotic disease with paraseptal emphysematous change, US abdomen complete w/doppler , US JOE Doppler, US LE arterial duplex b/l all unremarkable  - PFTs, 6 Minute Walk Test not done  - Consults: Dental, CVTS, Palliative Care, Nutrition, Neuropsych, Social Work Dental consult  - CVTS consulted     ===========   PULMONARY  ===========  # Mild-Moderate Emphysema  # Hx of COVID-19  Former smoker 2 ppd for 40 years quit on 09/09/09.  - Appreciate pulmonary consultation given emphysema  - ILD panel:                           - AAT 1 level: in process                          - autoimmune work-  up:                                      - LASHANDA positive                                      - Anti Helen-1 IgG negative                                      - SSA RO, SSB LA Ab negative                                      - Scleroderma ab scl 70 negative                                      - RF negative                                      - anti CCP 1.7                                      - ANCA IgG < 1:10                                      - Aldolase: 19.6(elevated)              - no need for PFTs with DLCO                                     - if symptoms are considered related to pulm findings: inhaler therapy with LABA/LAMA combination              - will need outpatient pulmonary follow up with serial PFT hs- CT scan with inspiration and expiration views     =====  Renal  =====  -Strict I&O while diuresing   -Monitor Electrolytes while actively diuresing K->4 Mg->2     ===================   GASTROENTEROLOGY  ===================  # Severe malnutrition in the context of chronic illness  # Liver injury, in the setting of cardiogenic shock  # GERD  - monitor  - Diuresis as above  - Supplements to help maintain nutrition.  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  - continue low intensity heparin gtt     # Anemia, acute on chronic  # Iron deficiency  Baseline hgb 8-9. Started to downtrend since nose bleed 6/22. Gradually downtrending without signs of new or ongoing bleeding. Found to have iron deficiency.   - defer transfusion at this time; goal Hgb 6.5              - transfuse 2 units if hgb < 6.5, nonirradiated  - S/p iron IV *3 doses [id number]  -  Restart ferrous sulfate 325 mg daily  - continue pediatric tubes for labs, limited blood draw  - monitor for signs of bleeding     # Profuse epistaxis from the left nare with bloody emesis  ~ 400 ml of bloody emesis in the blue bag on arrival. Also had one severe epistaxis that occurred at [place] requiring cauterization.  - Left nasal cavity packed with all absorbable packing - surgifoam + surgicel + surgiflo. No antibiotics are indicated due to absorbable packing.  - saline nasal pray q3h  - if begins to  in quantity of bleeding would recommend spraying the nasal cavity copiously with afrin and holding pressure over the soft part of the nose for 20 minutes continuously. Nasal clips are ineffective and should not be used in place of digital pressure. If bleeding continues despite these measures, repeat. If bleeding continues or is severe please contact ENT.     ==   ID  ==  ROHIT     ===============  RHEUMATOLOGY  ===============  #Lupus  # APL  - Continue pta hydroxychloroquine 200mg bid   - PTA mycophenolic acid 1500mg q12h on hold in anticipation for LVAD  -- Holding MMF one week prior to surgery and re-starting 5-7 days after LVAD placement in the absence of surgical site infection, poor wound healing or infection elsewhere.     ===    ===  - Continue pta tamsulosin 0.4mg daily       Lines/ tubes/ drains:  Fitzgerald Catheter: Not present  Central Lines: PRESENT  PICC Triple Lumen 06/17/22 Right Brachial vein medial-Site Assessment: WDL     Prophylaxis:  - DVT Prophylaxis: heparin gtt (on for DVT/PE in setting of APL)  - PUD Prophylaxis: pantoprazole     Code Status: Full Code       Disposition:  - ICU    The patient's care was discussed with the Attending Physician, Dr. Stewart, Bedside Nurse and Patient.    Emelyn Meneses MD  [facility]    ______________________________________________________________________    Interval History   Nursing notes reviewed.  Persistent IABP alarms overnight. Appears somewhat positional per nursing staff. No complaints this AM aside from feeling tired.     Data reviewed today: I reviewed all medications, new labs and imaging results over the last 24 hours. I personally reviewed the chest x-ray image(s) showing stable IABP     Physical Exam   Vital Signs: Temp: 98  F (36.7  C) Temp src: Oral BP: 99/72 Pulse: 74   Resp: 27 SpO2: 92 % O2 Device: None (Room air)    Weight: 145 lbs 11.58 oz  General Appearance: Male in NAD resting in bed, polite and conversational  Respiratory: ctab on RA, nonlabored  Cardiovascular: rrr, s1, s2, no murmur  GI: soft, nt/nd  Skin: warm, well perfused, IABP site c/d/i without swelling/hematoma  Neuro: alert, interactive, speech fluent, moving all 4     Data   Recent Labs   Lab 07/04/22  1036 07/04/22  0629 07/04/22  0423 07/03/22  0307 07/02/22  2121 07/02/22  0437   WBC  --  5.8 4.3 5.8  --  7.3   HGB  --  8.4* 10.4* 8.4*  --  8.9*   MCV  --  93 92 92  --  94   PLT  --  320 283 381  --  369   INR  --   --  1.26* 1.16*  --  1.19*    125*  --  132*   < > 130*   POTASSIUM 5.7* 4.6  --  4.1   < > 4.2   CHLORIDE 106 94*  --  100   < > 99   CO2 24 20*  --  22   < > 17*   BUN 13 11.8  --  13.3   < > 14.0   CR 0.61* 0.62*  --  0.67   < > 0.77   ANIONGAP 4 11  --  10   < > 14   ELDA 9.4 8.9  --  8.9   < > 9.1   * 86  --  96   < > 96   ALBUMIN  --  3.2*  --  3.2*  --  3.2*   PROTTOTAL  --  6.2*  --  6.3*  --  6.6   BILITOTAL  --  0.3  --  0.3  --  0.3   ALKPHOS  --  111  --  115  --  150*   ALT  --  42  --  55*  --  72*   AST  --  24  --  31  --  42    < > = values in this interval not displayed.     Recent Results (from the past 24 hour(s))   XR Chest Port 1 View    Narrative    EXAM: XR CHEST PORT 1 VIEW  7/3/2022 9:26 PM     HISTORY:  IABP not functioning properly       COMPARISON:  7/3/2022    FINDINGS  Technique: Semiupright AP view of the chest.     Devices: Intra-aortic balloon pump superior marker  projects over the  high descending thoracic aorta. Left chest 3-lead cardiac pacer/ICD  without apparent change in position. Right PICC terminates at the  superior cavoatrial junction.    No new focal airspace opacity.  No discernible pneumothorax.  No  pleural effusion.     Cardiomediastinal silhouette is  stable in size. Surgical clips  projected over the right axilla.      Impression    IMPRESSION:   Intra-aortic balloon pump superior marker projects over the high  descending thoracic aorta, similar position to most recent prior exam.    I have personally reviewed the examination and initial interpretation  and I agree with the findings.    CONSTANTINO OWEN MD         SYSTEM ID:  [id number]   XR Chest Port 1 View    Narrative    EXAM: XR CHEST PORT 1 VIEW  7/4/2022 9:53 AM     HISTORY:  swan       COMPARISON:  Chest radiograph, 7/3/2022    FINDINGS  Technique: Semiupright AP view of the chest.     Devices: Intra-aortic balloon pump superior marker projects over the  high descending thoracic aorta. Left chest 3-lead cardiac pacer/ICD  without apparent change in position. Right PICC terminates at the  superior cavoatrial junction.    No new focal airspace opacity.  No discernible pneumothorax.  No  pleural effusion.     Cardiomediastinal silhouette is  stable in size. Surgical clips  projected over the right axilla.      Impression    IMPRESSION:     1. No [place] catheter visualized on this exam.  2. Stable supporting devices.  3. No focal consolidation, pneumothorax or pleural effusion.    I have personally reviewed the examination and initial interpretation  and I agree with the findings.    CONSTANTINO OWEN MD         SYSTEM ID:  [id number]     Medications     heparin 1,350 Units/hr (07/04/22 1507)       aspirin  81 mg Oral Daily     DULoxetine  30 mg Oral Daily     ferrous sulfate  325 mg Oral Daily     fluticasone-vilanterol  1 puff Inhalation Daily     hydroxychloroquine  200 mg Oral BID     melatonin   10 mg Oral At Bedtime     polyethylene glycol  17 g Oral Daily     senna-docusate  2 tablet Oral BID     sodium chloride  2 spray Both Nostrils Q3H     tamsulosin  0.4 mg Oral Daily     thiamine  100 mg Oral Daily     traZODone  100 mg Oral At Bedtime

## 2022-07-05 ENCOUNTER — APPOINTMENT (OUTPATIENT)
Dept: GENERAL RADIOLOGY | Facility: CLINIC | Age: 62
DRG: 001 | End: 2022-07-05
Attending: INTERNAL MEDICINE
Payer: COMMERCIAL

## 2022-07-05 ENCOUNTER — PREP FOR PROCEDURE (OUTPATIENT)
Dept: CARDIOLOGY | Facility: CLINIC | Age: 62
End: 2022-07-05

## 2022-07-05 ENCOUNTER — APPOINTMENT (OUTPATIENT)
Dept: OCCUPATIONAL THERAPY | Facility: CLINIC | Age: 62
DRG: 001 | End: 2022-07-05
Attending: INTERNAL MEDICINE
Payer: COMMERCIAL

## 2022-07-05 DIAGNOSIS — R57.0 CARDIOGENIC SHOCK (H): Primary | ICD-10-CM

## 2022-07-05 LAB
ALBUMIN SERPL BCG-MCNC: 3.2 G/DL (ref 3.5–5.2)
ALP SERPL-CCNC: 120 U/L (ref 40–129)
ALT SERPL W P-5'-P-CCNC: 41 U/L (ref 10–50)
ANION GAP SERPL CALCULATED.3IONS-SCNC: 12 MMOL/L (ref 7–15)
AST SERPL W P-5'-P-CCNC: 24 U/L (ref 10–50)
BASE EXCESS BLDV CALC-SCNC: -0.5 MMOL/L (ref -7.7–1.9)
BILIRUB SERPL-MCNC: 0.3 MG/DL
BUN SERPL-MCNC: 14.7 MG/DL (ref 8–23)
CALCIUM SERPL-MCNC: 9.1 MG/DL (ref 8.8–10.2)
CHLORIDE SERPL-SCNC: 97 MMOL/L (ref 98–107)
CREAT SERPL-MCNC: 0.71 MG/DL (ref 0.67–1.17)
DEPRECATED HCO3 PLAS-SCNC: 21 MMOL/L (ref 22–29)
ERYTHROCYTE [DISTWIDTH] IN BLOOD BY AUTOMATED COUNT: 16.1 % (ref 10–15)
GFR SERPL CREATININE-BSD FRML MDRD: >90 ML/MIN/1.73M2
GLUCOSE SERPL-MCNC: 204 MG/DL (ref 70–99)
HCO3 BLDV-SCNC: 25 MMOL/L (ref 21–28)
HCT VFR BLD AUTO: 28.4 % (ref 40–53)
HGB BLD-MCNC: 8.8 G/DL (ref 13.3–17.7)
INR PPP: 1.28 (ref 0.85–1.15)
MAGNESIUM SERPL-MCNC: 2 MG/DL (ref 1.7–2.3)
MCH RBC QN AUTO: 28.3 PG (ref 26.5–33)
MCHC RBC AUTO-ENTMCNC: 31 G/DL (ref 31.5–36.5)
MCV RBC AUTO: 91 FL (ref 78–100)
O2/TOTAL GAS SETTING VFR VENT: 21 %
OXYHGB MFR BLDV: 84 % (ref 70–75)
PCO2 BLDV: 45 MM HG (ref 40–50)
PH BLDV: 7.36 [PH] (ref 7.32–7.43)
PLATELET # BLD AUTO: 362 10E3/UL (ref 150–450)
PO2 BLDV: 58 MM HG (ref 25–47)
POTASSIUM SERPL-SCNC: 4.5 MMOL/L (ref 3.4–5.3)
PROT SERPL-MCNC: 6.5 G/DL (ref 6.4–8.3)
RBC # BLD AUTO: 3.11 10E6/UL (ref 4.4–5.9)
SA 1 CELL: NORMAL
SA 1 CELL: NORMAL
SA 1 TEST METHOD: NORMAL
SA 1 TEST METHOD: NORMAL
SA 2 CELL: NORMAL
SA 2 CELL: NORMAL
SA 2 TEST METHOD: NORMAL
SA 2 TEST METHOD: NORMAL
SA1 HI RISK ABY: NORMAL
SA1 HI RISK ABY: NORMAL
SA1 MOD RISK ABY: NORMAL
SA1 MOD RISK ABY: NORMAL
SA2 HI RISK ABY: NORMAL
SA2 HI RISK ABY: NORMAL
SA2 MOD RISK ABY: NORMAL
SA2 MOD RISK ABY: NORMAL
SODIUM SERPL-SCNC: 130 MMOL/L (ref 136–145)
UNACCEPTABLE ANTIGENS: NORMAL
UNOS CPRA: 97
WBC # BLD AUTO: 5.9 10E3/UL (ref 4–11)
ZZZSA 1  COMMENTS: NORMAL
ZZZSA 1  COMMENTS: NORMAL
ZZZSA 2 COMMENTS: NORMAL
ZZZSA 2 COMMENTS: NORMAL

## 2022-07-05 PROCEDURE — 83735 ASSAY OF MAGNESIUM: CPT | Performed by: INTERNAL MEDICINE

## 2022-07-05 PROCEDURE — 97530 THERAPEUTIC ACTIVITIES: CPT | Mod: GO

## 2022-07-05 PROCEDURE — 250N000013 HC RX MED GY IP 250 OP 250 PS 637: Performed by: INTERNAL MEDICINE

## 2022-07-05 PROCEDURE — 85610 PROTHROMBIN TIME: CPT | Performed by: INTERNAL MEDICINE

## 2022-07-05 PROCEDURE — 85027 COMPLETE CBC AUTOMATED: CPT | Performed by: STUDENT IN AN ORGANIZED HEALTH CARE EDUCATION/TRAINING PROGRAM

## 2022-07-05 PROCEDURE — 71045 X-RAY EXAM CHEST 1 VIEW: CPT | Mod: 26 | Performed by: RADIOLOGY

## 2022-07-05 PROCEDURE — 999N000127 HC STATISTIC PERIPHERAL IV START W US GUIDANCE

## 2022-07-05 PROCEDURE — 71045 X-RAY EXAM CHEST 1 VIEW: CPT

## 2022-07-05 PROCEDURE — 999N000075 HC STATISTIC IABP MONITORING

## 2022-07-05 PROCEDURE — 82805 BLOOD GASES W/O2 SATURATION: CPT | Performed by: STUDENT IN AN ORGANIZED HEALTH CARE EDUCATION/TRAINING PROGRAM

## 2022-07-05 PROCEDURE — 80053 COMPREHEN METABOLIC PANEL: CPT | Performed by: STUDENT IN AN ORGANIZED HEALTH CARE EDUCATION/TRAINING PROGRAM

## 2022-07-05 PROCEDURE — 99291 CRITICAL CARE FIRST HOUR: CPT | Mod: GC | Performed by: INTERNAL MEDICINE

## 2022-07-05 PROCEDURE — 99207 PR NO CHARGE LOS: CPT | Performed by: INTERNAL MEDICINE

## 2022-07-05 PROCEDURE — 200N000002 HC R&B ICU UMMC

## 2022-07-05 PROCEDURE — 99221 1ST HOSP IP/OBS SF/LOW 40: CPT | Mod: GC | Performed by: STUDENT IN AN ORGANIZED HEALTH CARE EDUCATION/TRAINING PROGRAM

## 2022-07-05 PROCEDURE — 250N000013 HC RX MED GY IP 250 OP 250 PS 637: Performed by: STUDENT IN AN ORGANIZED HEALTH CARE EDUCATION/TRAINING PROGRAM

## 2022-07-05 PROCEDURE — 250N000011 HC RX IP 250 OP 636: Performed by: STUDENT IN AN ORGANIZED HEALTH CARE EDUCATION/TRAINING PROGRAM

## 2022-07-05 PROCEDURE — 250N000013 HC RX MED GY IP 250 OP 250 PS 637

## 2022-07-05 RX ADMIN — POLYETHYLENE GLYCOL 3350 17 G: 17 POWDER, FOR SOLUTION ORAL at 08:30

## 2022-07-05 RX ADMIN — SALINE NASAL SPRAY 2 SPRAY: 1.5 SOLUTION NASAL at 04:02

## 2022-07-05 RX ADMIN — HEPARIN SODIUM 1350 UNITS/HR: 10000 INJECTION, SOLUTION INTRAVENOUS at 08:31

## 2022-07-05 RX ADMIN — SALINE NASAL SPRAY 2 SPRAY: 1.5 SOLUTION NASAL at 08:32

## 2022-07-05 RX ADMIN — ASPIRIN 81 MG CHEWABLE TABLET 81 MG: 81 TABLET CHEWABLE at 08:30

## 2022-07-05 RX ADMIN — HYDROXYCHLOROQUINE SULFATE 200 MG: 200 TABLET, FILM COATED ORAL at 20:08

## 2022-07-05 RX ADMIN — FLUTICASONE FUROATE AND VILANTEROL TRIFENATATE 1 PUFF: 100; 25 POWDER RESPIRATORY (INHALATION) at 08:31

## 2022-07-05 RX ADMIN — SENNOSIDES AND DOCUSATE SODIUM 2 TABLET: 8.6; 5 TABLET ORAL at 20:09

## 2022-07-05 RX ADMIN — OXYCODONE HYDROCHLORIDE 5 MG: 5 TABLET ORAL at 20:31

## 2022-07-05 RX ADMIN — SALINE NASAL SPRAY 2 SPRAY: 1.5 SOLUTION NASAL at 15:00

## 2022-07-05 RX ADMIN — TAMSULOSIN HYDROCHLORIDE 0.4 MG: 0.4 CAPSULE ORAL at 08:30

## 2022-07-05 RX ADMIN — Medication 10 MG: at 20:08

## 2022-07-05 RX ADMIN — SALINE NASAL SPRAY 2 SPRAY: 1.5 SOLUTION NASAL at 18:46

## 2022-07-05 RX ADMIN — HYDROXYCHLOROQUINE SULFATE 200 MG: 200 TABLET, FILM COATED ORAL at 08:30

## 2022-07-05 RX ADMIN — DULOXETINE 30 MG: 30 CAPSULE, DELAYED RELEASE ORAL at 08:30

## 2022-07-05 RX ADMIN — SENNOSIDES AND DOCUSATE SODIUM 2 TABLET: 8.6; 5 TABLET ORAL at 08:30

## 2022-07-05 RX ADMIN — FERROUS SULFATE TAB 325 MG (65 MG ELEMENTAL FE) 325 MG: 325 (65 FE) TAB at 08:30

## 2022-07-05 RX ADMIN — OXYCODONE HYDROCHLORIDE 5 MG: 5 TABLET ORAL at 08:30

## 2022-07-05 RX ADMIN — TRAZODONE HYDROCHLORIDE 100 MG: 50 TABLET ORAL at 20:08

## 2022-07-05 RX ADMIN — SALINE NASAL SPRAY 2 SPRAY: 1.5 SOLUTION NASAL at 00:00

## 2022-07-05 RX ADMIN — SALINE NASAL SPRAY 2 SPRAY: 1.5 SOLUTION NASAL at 20:17

## 2022-07-05 RX ADMIN — THIAMINE HCL TAB 100 MG 100 MG: 100 TAB at 08:30

## 2022-07-05 ASSESSMENT — ACTIVITIES OF DAILY LIVING (ADL)
ADLS_ACUITY_SCORE: 28
ADLS_ACUITY_SCORE: 32
ADLS_ACUITY_SCORE: 28
ADLS_ACUITY_SCORE: 32
ADLS_ACUITY_SCORE: 28

## 2022-07-05 NOTE — SUMMARY OF CARE
Patient slept for most of night c/o R lower hip pain, assessed and found no apparent issues, patient thought he hurt because he didn't get in the chair in the day time yesterday, he said he would get into chair q DAY NOW, DICUSSED THE NEED TO MOVE AROUND AND NOT STAY in bed to keep as fit as possible.. Continue plan of care, monitor all parameters, encourage activity and independence.

## 2022-07-05 NOTE — PLAN OF CARE
Goal Outcome Evaluation:    Plan of Care Reviewed With: patient     Overall Patient Progress: improving    Outcome Evaluation: PO intake %

## 2022-07-05 NOTE — PROGRESS NOTES
"CLINICAL NUTRITION SERVICES - REASSESSMENT NOTE     Nutrition Prescription    RECOMMENDATIONS FOR MDs/PROVIDERS TO ORDER:  -If EN needed post-transplant/LVAD, please re-consult: \"Registered Dietitian to Assess and Order TF per Medical Nutrition Therapy Protocol\"    Malnutrition Status:    Moderate malnutrition in the context of chronic illness    Recommendations already ordered by Registered Dietitian (RD):  -Continue 100 mg thiamine daily   -Continue Ensure daily     Future/Additional Recommendations:  -Continue to monitor PO intake/adequacy      If EN needed post-transplant/LVAD recommend:  Osmolite 1.5 Virgilio @ goal of 50ml/hr (1200ml/day) + 1 pkt Prosource TID will provide: 1920 kcals (29 kcal/kg), 108 g PRO (1.6 g/kg), 914 ml free H20, 244 g CHO, and 0 g fiber daily.     EVALUATION OF THE PROGRESS TOWARD GOALS   Diet: 2 g Sodium, 2L fluid restriction     Intake: % PO intake per RN documentation. Pt has been ordering pot roast, sandwiches, corn, ice cream, grapes, quesadilla, yogurt, muffins.    Calorie counts 6/26-28:  6/28       Total Kcals: 367       Total Protein: 7g   6/27       Total Kcals: 1435     Total Protein: 58g   6/26       Total Kcals: 0           Total Protein: 0g     NEW FINDINGS   Tentative LVAD placement this week.     GI: LBM 7/3    Labs: Reviewed - Hyponatremia    Medications: Reviewed    Weights: Weight has remained relatively stable over the past week.   07/05/22 0400 65.3 kg (143 lb 15.4 oz)   07/04/22 0500 66.1 kg (145 lb 11.6 oz)   07/03/22 0000 66.3 kg (146 lb 3.2 oz)   07/01/22 0000 66 kg (145 lb 6.4 oz)   06/30/22 0800 66.1 kg (145 lb 11.2 oz)   06/29/22 0900 66 kg (145 lb 8 oz)   06/28/22 0900 66.5 kg (146 lb 9.7 oz)     MALNUTRITION  % Intake: No decreased intake noted  % Weight Loss: None noted  Subcutaneous Fat Loss: Global mild  Muscle Loss: Temporal:  Moderate, Thoracic region (clavicle, acromium bone, deltoid, trapezius, pectoral):  Mild, Upper leg (quadricep, " hamstring):  Mild and Posterior calf:  Moderate  Fluid Accumulation/Edema: None noted  Malnutrition Diagnosis: Moderate malnutrition in the context of chronic illness    Previous Goals   Patient to consume % of nutritionally adequate meal trays TID, or the equivalent with supplements/snacks.  Evaluation: Met    Previous Nutrition Diagnosis  Predicted inadequate nutrient intake related to intubation as evidenced by NPO status and possible need for EN post-transplant.   Evaluation: No change    CURRENT NUTRITION DIAGNOSIS  Predicted inadequate nutrient intake related to intubation as evidenced by NPO status and possible need for EN post-transplant/LVAD.       INTERVENTIONS  Implementation  Multi-trace element supplement therapy - thiamine daily     Goals  Patient to consume % of nutritionally adequate meal trays TID, or the equivalent with supplements/snacks.    Monitoring/Evaluation  Progress toward goals will be monitored and evaluated per protocol.    Kelsey Prado, MS, RD, LD  4E (CVICU) RD pager: [phone]  Ascom: [phone]  Weekend/Holiday RD pager: [phone]

## 2022-07-05 NOTE — PROGRESS NOTES
[facility]    Cardiology Progress Note        Date of Admission:  6/17/2022     Assessment & Plan: HVSL   61-year-old man with history of ischemic cardiomyopathy LVEF 15%) NYHA class IV symptoms ACC stage D, multiple admissions with heart failure over the past several months. Presented with cardiogenic shock c/b multi-organ failure (JOSE; Acute liver injury/shock liver) requiring mechanical hemodynamic support. Now listed as status 2 for OHT. Course c/b large epistaxis 6/22 with 400 ml blood. Had subclavian IABP placed instead of femoral IABP 6/23/22, adjusted 7/3/22 due to coiling in aortic arch that was asymptomatic and without pulse changes in extremities. Hemoglobin gradually downtrending though recently stable without e/o further e/o bleeding. Continue to optimize his hemodynamics while awaiting LVAD/transplant.     Updates:  - continue IABP at 1:1  - will discuss elevated PRA with oncology team  - Tentatively plan for LVAD later this week      Plan: Critical Care      ===============  CARDIOVASCULAR  ===============  # Ambulatory cardiogenic shock SCAI D  # Advanced ischemic cardiomyopathy, Class IV, Stage D  # s/p CRT-D (Medtronic 2006, gen change 2012)  # SVT  # Nonsustained VT  # CAD s/p PCI to LAD (2005)  # Hx of MI (2007)  # Severe ostial LAD disease with in-stent restenosis of mid LAD at diag bifurcation, severe proximal RCA disease, mild circ disease now s/p ostial LAD and proximal RCA lithotripsy and stenting on 5/24/22  CMRI showing infarcted myocardium in LAD territory. Given the degree of LV dysfunction/dilation, moderate to severe functional MR and lack of viable myocardium, cardiac surgery would be high risk. Now s/p ostial LAD and proximal RCA lithotripsy and stenting on 5/24/22. RHC 7/1 to reassess hemodynamics, results as below. Being evaluated for transplant/LVAD, PRA returned with reaction with 97% of antibodies in panel, high risk of acute  rejection. Ongoing discussion with immunology regarding any potential reasons for particularly high PRA, suggested oncology consult for evaluation of possible malignancy causing polyclonal activation. CT Chest/Abdomen/Pelvis without any evidence of new malignancy. Oncology consulted.      RHC 7/1:  RA 6  RV 52/7  PA 53/26 (35)  PCWP 22  [place] CO 3.63, CI 2.06,   TD CO 3.87, CI 2.19  PVR by TD CO 3.3     - Mechanical support: IABP 1:1, continuing (placed 6/23/22)  - Anticoagulation: heparin gtt DVT/PE in setting of APL  - Volume status: euvolemic and making adequate urine; no diuretics today   - BB: contraindicated  - ACEi/ARB/ARNI: none  - MRA: none  - SGLT2i: none  - SCD prophylaxis: Medtronic CRT-D      LVAD/transplant workup started, discussion with patient:  - Echocardiogram done  - Cardiopulmonary stress testing ordered  - Infectious labs: Quant Gold (-), VZV IgG (+), Treponema Abs w/reflex RPR/titer (-), Toxoplasma IgG (-), Hepatitis serology (all negative, HepB nonimmune), HSV1/2 IgG (-), HIV combo (-), EBV/CMV IgG (both positive)  - Other labs: blood type (O positive), iron studies, prealbumin 22, UA done, TSH 1.65, PRA single antigen IgG antibody (97%)  - Substance abuse screen: PEth (-), UDS (-)  - RHC done, current hemodynamic monitoring  - Imaging: CT Chest A/P with Chronic interstitial subpleural fibrotic disease with paraseptal emphysematous change, US abdomen complete w/doppler , US JOE Doppler, US LE arterial duplex b/l all unremarkable  - PFTs, 6 Minute Walk Test not done  - Consults: Dental, CVTS, Palliative Care, Nutrition, Neuropsych, Social Work Dental consult  - CVTS consulted  - elevated PRA to 97; discussing with immunology and oncology possible etiologies for this     ===========   PULMONARY  ===========  # Mild-Moderate Emphysema  # Hx of COVID-19  Former smoker 2 ppd for 40 years quit on 09/09/09.  - Appreciate pulmonary consultation given emphysema  - ILD panel:                           -  autoimmune work- up:                                      - LASHANDA positive                                      - Anti Helen-1 IgG negative                                      - SSA RO, SSB LA Ab negative                                      - Scleroderma ab scl 70 negative                                      - RF negative                                      - anti CCP 1.7                                      - ANCA IgG < 1:10                                      - Aldolase: 19.6(elevated)              - no need for PFTs with DLCO                                     - if symptoms are considered related to pulm findings: inhaler therapy with LABA/LAMA combination     =====  Renal  =====  -Strict I&O while diuresing   -Monitor Electrolytes while actively diuresing K->4 Mg->2     ===================   GASTROENTEROLOGY  ===================  # Severe malnutrition in the context of chronic illness  # Liver injury, in the setting of cardiogenic shock  # GERD  - monitor  - Diuresis as above  - Supplements to help maintain nutrition.  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  - continue low intensity heparin gtt     # Anemia, acute on chronic  # Iron deficiency  Baseline hgb 8-9. Started to downtrend since nose bleed 6/22. Gradually downtrending without signs of new or ongoing bleeding. Found to have iron deficiency.   - defer transfusion at this time; goal Hgb 6.5              - transfuse 2 units if hgb < 6.5, nonirradiated  - S/p iron IV *3 doses [id number]  - Restart ferrous sulfate 325 mg daily  - continue pediatric tubes for labs, limited blood draw  - monitor for  signs of bleeding     # Profuse epistaxis from the left nare with bloody emesis  ~ 400 ml of bloody emesis in the blue bag on arrival. Also had one severe epistaxis that occurred at [place] requiring cauterization.  - Left nasal cavity packed with all absorbable packing - surgifoam + surgicel + surgiflo. No antibiotics are indicated due to absorbable packing.  - saline nasal pray q3h  - if begins to  in quantity of bleeding would recommend spraying the nasal cavity copiously with afrin and holding pressure over the soft part of the nose for 20 minutes continuously. Nasal clips are ineffective and should not be used in place of digital pressure. If bleeding continues despite these measures, repeat. If bleeding continues or is severe please contact ENT.     ==   ID  ==  ROHIT     ===============  RHEUMATOLOGY  ===============  #Lupus  # APL  - Continue pta hydroxychloroquine 200mg bid   - PTA mycophenolic acid 1500mg q12h on hold in anticipation for LVAD  -- Holding MMF one week prior to surgery and re-starting 5-7 days after LVAD placement in the absence of surgical site infection, poor wound healing or infection elsewhere.     ===    ===  - Continue pta tamsulosin 0.4mg daily       Lines/ tubes/ drains:  Fitzgerald Catheter: Not present  Central Lines: PRESENT  PICC Triple Lumen 06/17/22 Right Brachial vein medial-Site Assessment: WDL     Prophylaxis:  - DVT Prophylaxis: heparin gtt (on for DVT/PE in setting of APL)  - PUD Prophylaxis: pantoprazole     Code Status: Full Code       Disposition:  - ICU    The patient's care was discussed with the Attending Physician, Dr. Stewart, Bedside Nurse and Patient.    Emelyn Meneses MD  [facility]    ______________________________________________________________________    Interval History   Nursing notes reviewed. BRAXTON. Feeling well today and without complaints. States he got more rest overnight. No chest pain or SOB.      Data reviewed today: I reviewed all medications, new labs and imaging results over the last 24 hours. I personally reviewed the chest x-ray image(s) showing stable IABP     Physical Exam   Vital Signs: Temp: 97.4  F (36.3  C) Temp src: Oral BP: 110/73 Pulse: 81   Resp: 18 SpO2: 94 % O2 Device: None (Room air)    Weight: 143 lbs 15.37 oz  General Appearance: Male in NAD resting in bed, polite and conversational  Respiratory: ctab on RA, nonlabored  Cardiovascular: rrr, s1, s2, no murmur  GI: soft, nt/nd  Skin: warm, well perfused, IABP site c/d/i without swelling/hematoma  Neuro: alert, interactive, speech fluent, moving all 4     Data   Recent Labs   Lab 07/05/22  0404 07/04/22  1036 07/04/22  0629 07/04/22  0423 07/03/22  0307   WBC 5.9  --  5.8 4.3 5.8   HGB 8.8*  --  8.4* 10.4* 8.4*   MCV 91  --  93 92 92     --  320 283 381   INR 1.28*  --   --  1.26* 1.16*   * 134 125*  --  132*   POTASSIUM 4.5 5.7* 4.6  --  4.1   CHLORIDE 97* 106 94*  --  100   CO2 21* 24 20*  --  22   BUN 14.7 13 11.8  --  13.3   CR 0.71 0.61* 0.62*  --  0.67   ANIONGAP 12 4 11  --  10   ELDA 9.1 9.4 8.9  --  8.9   * 116* 86  --  96   ALBUMIN 3.2*  --  3.2*  --  3.2*   PROTTOTAL 6.5  --  6.2*  --  6.3*   BILITOTAL 0.3  --  0.3  --  0.3   ALKPHOS 120  --  111  --  115   ALT 41  --  42  --  55*   AST 24  --  24  --  31     Recent Results (from the past 24 hour(s))   XR Chest Port 1 View    Narrative    EXAM: XR CHEST PORT 1 VIEW  7/4/2022 9:53 AM     HISTORY:  swan       COMPARISON:  Chest radiograph, 7/3/2022    FINDINGS  Technique: Semiupright AP view of the chest.     Devices: Intra-aortic balloon pump superior marker projects over the  high descending thoracic aorta. Left chest 3-lead cardiac pacer/ICD  without apparent change in position. Right PICC terminates at the  superior cavoatrial junction.    No new focal airspace opacity.  No discernible pneumothorax.  No  pleural effusion.     Cardiomediastinal silhouette  is  stable in size. Surgical clips  projected over the right axilla.      Impression    IMPRESSION:     1. No [place]-Janae catheter visualized on this exam.  2. Stable supporting devices.  3. No focal consolidation, pneumothorax or pleural effusion.    I have personally reviewed the examination and initial interpretation  and I agree with the findings.    CONSTANTINO OWEN MD         SYSTEM ID:  [id number]     Medications     heparin 1,350 Units/hr (07/05/22 0600)       aspirin  81 mg Oral Daily     DULoxetine  30 mg Oral Daily     ferrous sulfate  325 mg Oral Daily     fluticasone-vilanterol  1 puff Inhalation Daily     hydroxychloroquine  200 mg Oral BID     melatonin  10 mg Oral At Bedtime     polyethylene glycol  17 g Oral Daily     senna-docusate  2 tablet Oral BID     sodium chloride  2 spray Both Nostrils Q3H     tamsulosin  0.4 mg Oral Daily     thiamine  100 mg Oral Daily     traZODone  100 mg Oral At Bedtime

## 2022-07-05 NOTE — PLAN OF CARE
Neurology:  AO x4, Pulses doppler, no fever, minimal back pain.    Cardiovascular: IABP R subclavian. 1:1 % No alarms today. XRAY taken and position confirmed. Stable BP. PPM ICD AAI-%    Pulmonary: RA    Gastrointestinal, Nutrition: 2L fluid and low NA diet        Renal, Electrolytes: last K 4.5. Void with urinal. Net negative today.       Infectious Disease: ID following as well as ONC. Unknown Etiology of antibody markers present.       Hematology: stable heme and platelets       Endocrinology: No insulin. Reg diet.       MSK/Skin: Lower back pain. Mobile in hallway today with minimal assistance. Skin CDI. Old R fem site. Old PPM site. Scattered bruising.     Lines/Drains: R Brach PICC TL. R subclavian IABP. Stat lock and sutured x2      Plan: Status 2A Heart transplant. LVAD Friday AM 7/8. Promote independence. LVAD coordinator will meet with patient and family tomorrow at 1400.            All questions from family and patient have been addressed  All safety checks complete  MD aware of all patient status changes and updated promptly  See chart for all other details

## 2022-07-06 ENCOUNTER — APPOINTMENT (OUTPATIENT)
Dept: GENERAL RADIOLOGY | Facility: CLINIC | Age: 62
DRG: 001 | End: 2022-07-06
Attending: STUDENT IN AN ORGANIZED HEALTH CARE EDUCATION/TRAINING PROGRAM
Payer: COMMERCIAL

## 2022-07-06 ENCOUNTER — APPOINTMENT (OUTPATIENT)
Dept: OCCUPATIONAL THERAPY | Facility: CLINIC | Age: 62
DRG: 001 | End: 2022-07-06
Attending: INTERNAL MEDICINE
Payer: COMMERCIAL

## 2022-07-06 LAB
ALBUMIN SERPL BCG-MCNC: 3.4 G/DL (ref 3.5–5.2)
ALP SERPL-CCNC: 125 U/L (ref 40–129)
ALT SERPL W P-5'-P-CCNC: 39 U/L (ref 10–50)
ANION GAP SERPL CALCULATED.3IONS-SCNC: 11 MMOL/L (ref 7–15)
AST SERPL W P-5'-P-CCNC: 30 U/L (ref 10–50)
BASE EXCESS BLDV CALC-SCNC: -0.4 MMOL/L (ref -7.7–1.9)
BILIRUB SERPL-MCNC: 0.3 MG/DL
BUN SERPL-MCNC: 14.7 MG/DL (ref 8–23)
C3 SERPL-MCNC: 166 MG/DL (ref 81–157)
C4 SERPL-MCNC: 39 MG/DL (ref 13–39)
CALCIUM SERPL-MCNC: 9.5 MG/DL (ref 8.8–10.2)
CELL TYPE ALLO: ABNORMAL
CELL TYPE AUTO: NORMAL
CHANNELSHIFTALLOB1: 513
CHANNELSHIFTALLOB2: 136
CHANNELSHIFTALLOB3: 142
CHANNELSHIFTALLOT1: 458
CHANNELSHIFTALLOT2: 71
CHANNELSHIFTALLOT3: 87
CHANNELSHIFTAUTOB1: -62
CHANNELSHIFTAUTOB2: -54
CHANNELSHIFTAUTOB3: -64
CHANNELSHIFTAUTOT1: -34
CHANNELSHIFTAUTOT2: -30
CHANNELSHIFTAUTOT3: -33
CHLORIDE SERPL-SCNC: 101 MMOL/L (ref 98–107)
CREAT SERPL-MCNC: 0.74 MG/DL (ref 0.67–1.17)
CROSSMATCHDATEALLO: ABNORMAL
CROSSMATCHDATEAUTO: NORMAL
DEPRECATED HCO3 PLAS-SCNC: 22 MMOL/L (ref 22–29)
DONOR ALLO: ABNORMAL
DONOR AUTO: NORMAL
DONORCELLDATE ALLO: ABNORMAL
DONORCELLDATE AUTO: NORMAL
ERYTHROCYTE [DISTWIDTH] IN BLOOD BY AUTOMATED COUNT: 15.7 % (ref 10–15)
GFR SERPL CREATININE-BSD FRML MDRD: >90 ML/MIN/1.73M2
GLUCOSE SERPL-MCNC: 87 MG/DL (ref 70–99)
HCO3 BLDV-SCNC: 25 MMOL/L (ref 21–28)
HCT VFR BLD AUTO: 30.5 % (ref 40–53)
HGB BLD-MCNC: 9.2 G/DL (ref 13.3–17.7)
INR PPP: 1.12 (ref 0.85–1.15)
Lab: ABNORMAL
MAGNESIUM SERPL-MCNC: 2 MG/DL (ref 1.7–2.3)
MCH RBC QN AUTO: 27.9 PG (ref 26.5–33)
MCHC RBC AUTO-ENTMCNC: 30.2 G/DL (ref 31.5–36.5)
MCV RBC AUTO: 92 FL (ref 78–100)
O2/TOTAL GAS SETTING VFR VENT: 21 %
OXYHGB MFR BLDV: 49 % (ref 70–75)
PCO2 BLDV: 42 MM HG (ref 40–50)
PH BLDV: 7.38 [PH] (ref 7.32–7.43)
PLATELET # BLD AUTO: 429 10E3/UL (ref 150–450)
PO2 BLDV: 31 MM HG (ref 25–47)
POS CUT OFF ALLO B: >93
POS CUT OFF ALLO T: >79
POS CUT OFF AUTO B: >93
POS CUT OFF AUTO T: >79
POTASSIUM SERPL-SCNC: 4.5 MMOL/L (ref 3.4–5.3)
PROT SERPL-MCNC: 7 G/DL (ref 6.4–8.3)
RBC # BLD AUTO: 3.3 10E6/UL (ref 4.4–5.9)
RESULT ALLO B1: ABNORMAL
RESULT ALLO B2: ABNORMAL
RESULT ALLO B3: ABNORMAL
RESULT ALLO T1: ABNORMAL
RESULT ALLO T2: ABNORMAL
RESULT ALLO T3: ABNORMAL
RESULT AUTO B1: NORMAL
RESULT AUTO B2: NORMAL
RESULT AUTO B3: NORMAL
RESULT AUTO T1: NORMAL
RESULT AUTO T2: NORMAL
RESULT AUTO T3: NORMAL
SERUM DATE ALLO B1: ABNORMAL
SERUM DATE ALLO B2: ABNORMAL
SERUM DATE ALLO B3: ABNORMAL
SERUM DATE ALLO T1: ABNORMAL
SERUM DATE ALLO T2: ABNORMAL
SERUM DATE ALLO T3: ABNORMAL
SERUM DATE AUTO B1: NORMAL
SERUM DATE AUTO B2: NORMAL
SERUM DATE AUTO B3: NORMAL
SERUM DATE AUTO T1: NORMAL
SERUM DATE AUTO T2: NORMAL
SERUM DATE AUTO T3: NORMAL
SODIUM SERPL-SCNC: 134 MMOL/L (ref 136–145)
TESTMETHODALLO: ABNORMAL
TESTMETHODAUTO: NORMAL
TREATMENT ALLO B1: ABNORMAL
TREATMENT ALLO B2: ABNORMAL
TREATMENT ALLO B3: ABNORMAL
TREATMENT ALLO T1: ABNORMAL
TREATMENT ALLO T2: ABNORMAL
TREATMENT ALLO T3: ABNORMAL
TREATMENT AUTO B1: NORMAL
TREATMENT AUTO B2: NORMAL
TREATMENT AUTO B3: NORMAL
TREATMENT AUTO T1: NORMAL
TREATMENT AUTO T2: NORMAL
TREATMENT AUTO T3: NORMAL
UFH PPP CHRO-ACNC: 0.15 IU/ML
UFH PPP CHRO-ACNC: 0.38 IU/ML
UFH PPP CHRO-ACNC: 0.53 IU/ML
UNACCEPTABLE ANTIGENS: NORMAL
UNOS CPRA: 97
WBC # BLD AUTO: 6.6 10E3/UL (ref 4–11)

## 2022-07-06 PROCEDURE — 250N000013 HC RX MED GY IP 250 OP 250 PS 637: Performed by: STUDENT IN AN ORGANIZED HEALTH CARE EDUCATION/TRAINING PROGRAM

## 2022-07-06 PROCEDURE — 85520 HEPARIN ASSAY: CPT | Performed by: THORACIC SURGERY (CARDIOTHORACIC VASCULAR SURGERY)

## 2022-07-06 PROCEDURE — 200N000002 HC R&B ICU UMMC

## 2022-07-06 PROCEDURE — 80053 COMPREHEN METABOLIC PANEL: CPT | Performed by: STUDENT IN AN ORGANIZED HEALTH CARE EDUCATION/TRAINING PROGRAM

## 2022-07-06 PROCEDURE — 85027 COMPLETE CBC AUTOMATED: CPT | Performed by: STUDENT IN AN ORGANIZED HEALTH CARE EDUCATION/TRAINING PROGRAM

## 2022-07-06 PROCEDURE — 250N000013 HC RX MED GY IP 250 OP 250 PS 637: Performed by: INTERNAL MEDICINE

## 2022-07-06 PROCEDURE — 250N000013 HC RX MED GY IP 250 OP 250 PS 637

## 2022-07-06 PROCEDURE — 97110 THERAPEUTIC EXERCISES: CPT | Mod: GO

## 2022-07-06 PROCEDURE — 250N000011 HC RX IP 250 OP 636: Performed by: STUDENT IN AN ORGANIZED HEALTH CARE EDUCATION/TRAINING PROGRAM

## 2022-07-06 PROCEDURE — 999N000075 HC STATISTIC IABP MONITORING

## 2022-07-06 PROCEDURE — 99207 PR NO CHARGE LOS: CPT | Performed by: INTERNAL MEDICINE

## 2022-07-06 PROCEDURE — 85520 HEPARIN ASSAY: CPT | Performed by: INTERNAL MEDICINE

## 2022-07-06 PROCEDURE — 71045 X-RAY EXAM CHEST 1 VIEW: CPT

## 2022-07-06 PROCEDURE — 250N000013 HC RX MED GY IP 250 OP 250 PS 637: Performed by: THORACIC SURGERY (CARDIOTHORACIC VASCULAR SURGERY)

## 2022-07-06 PROCEDURE — 82805 BLOOD GASES W/O2 SATURATION: CPT | Performed by: STUDENT IN AN ORGANIZED HEALTH CARE EDUCATION/TRAINING PROGRAM

## 2022-07-06 PROCEDURE — 99291 CRITICAL CARE FIRST HOUR: CPT | Mod: GC | Performed by: INTERNAL MEDICINE

## 2022-07-06 PROCEDURE — 85610 PROTHROMBIN TIME: CPT | Performed by: INTERNAL MEDICINE

## 2022-07-06 PROCEDURE — 83735 ASSAY OF MAGNESIUM: CPT | Performed by: INTERNAL MEDICINE

## 2022-07-06 PROCEDURE — 71045 X-RAY EXAM CHEST 1 VIEW: CPT | Mod: 26 | Performed by: RADIOLOGY

## 2022-07-06 RX ORDER — OXYCODONE HYDROCHLORIDE 5 MG/1
5 TABLET ORAL
Status: COMPLETED | OUTPATIENT
Start: 2022-07-06 | End: 2022-07-06

## 2022-07-06 RX ORDER — MAGNESIUM OXIDE 400 MG/1
400 TABLET ORAL EVERY 4 HOURS
Status: COMPLETED | OUTPATIENT
Start: 2022-07-06 | End: 2022-07-06

## 2022-07-06 RX ADMIN — Medication 400 MG: at 06:22

## 2022-07-06 RX ADMIN — OXYCODONE HYDROCHLORIDE 5 MG: 5 TABLET ORAL at 08:02

## 2022-07-06 RX ADMIN — OXYCODONE HYDROCHLORIDE 5 MG: 5 TABLET ORAL at 01:35

## 2022-07-06 RX ADMIN — SENNOSIDES AND DOCUSATE SODIUM 2 TABLET: 8.6; 5 TABLET ORAL at 20:01

## 2022-07-06 RX ADMIN — LIDOCAINE PATCH 4% 2 PATCH: 40 PATCH TOPICAL at 08:01

## 2022-07-06 RX ADMIN — SALINE NASAL SPRAY 2 SPRAY: 1.5 SOLUTION NASAL at 03:45

## 2022-07-06 RX ADMIN — TAMSULOSIN HYDROCHLORIDE 0.4 MG: 0.4 CAPSULE ORAL at 08:01

## 2022-07-06 RX ADMIN — POLYETHYLENE GLYCOL 3350 17 G: 17 POWDER, FOR SOLUTION ORAL at 08:01

## 2022-07-06 RX ADMIN — OXYCODONE HYDROCHLORIDE 5 MG: 5 TABLET ORAL at 04:07

## 2022-07-06 RX ADMIN — SALINE NASAL SPRAY 2 SPRAY: 1.5 SOLUTION NASAL at 08:06

## 2022-07-06 RX ADMIN — SALINE NASAL SPRAY 2 SPRAY: 1.5 SOLUTION NASAL at 21:03

## 2022-07-06 RX ADMIN — DULOXETINE 30 MG: 30 CAPSULE, DELAYED RELEASE ORAL at 08:02

## 2022-07-06 RX ADMIN — THIAMINE HCL TAB 100 MG 100 MG: 100 TAB at 08:01

## 2022-07-06 RX ADMIN — SALINE NASAL SPRAY 2 SPRAY: 1.5 SOLUTION NASAL at 06:23

## 2022-07-06 RX ADMIN — HEPARIN SODIUM 1350 UNITS/HR: 10000 INJECTION, SOLUTION INTRAVENOUS at 01:36

## 2022-07-06 RX ADMIN — SALINE NASAL SPRAY 2 SPRAY: 1.5 SOLUTION NASAL at 15:00

## 2022-07-06 RX ADMIN — HYDROXYZINE HYDROCHLORIDE 25 MG: 25 TABLET, FILM COATED ORAL at 04:08

## 2022-07-06 RX ADMIN — OXYCODONE HYDROCHLORIDE 5 MG: 5 TABLET ORAL at 21:03

## 2022-07-06 RX ADMIN — HYDROXYCHLOROQUINE SULFATE 200 MG: 200 TABLET, FILM COATED ORAL at 08:01

## 2022-07-06 RX ADMIN — SALINE NASAL SPRAY 2 SPRAY: 1.5 SOLUTION NASAL at 18:22

## 2022-07-06 RX ADMIN — OXYCODONE HYDROCHLORIDE 5 MG: 5 TABLET ORAL at 04:08

## 2022-07-06 RX ADMIN — HYDROXYZINE HYDROCHLORIDE 25 MG: 25 TABLET, FILM COATED ORAL at 20:02

## 2022-07-06 RX ADMIN — ASPIRIN 81 MG CHEWABLE TABLET 81 MG: 81 TABLET CHEWABLE at 08:01

## 2022-07-06 RX ADMIN — HYDROXYCHLOROQUINE SULFATE 200 MG: 200 TABLET, FILM COATED ORAL at 20:02

## 2022-07-06 RX ADMIN — Medication 400 MG: at 09:55

## 2022-07-06 RX ADMIN — Medication 10 MG: at 21:02

## 2022-07-06 RX ADMIN — SENNOSIDES AND DOCUSATE SODIUM 2 TABLET: 8.6; 5 TABLET ORAL at 08:02

## 2022-07-06 RX ADMIN — FERROUS SULFATE TAB 325 MG (65 MG ELEMENTAL FE) 325 MG: 325 (65 FE) TAB at 08:09

## 2022-07-06 RX ADMIN — FLUTICASONE FUROATE AND VILANTEROL TRIFENATATE 1 PUFF: 100; 25 POWDER RESPIRATORY (INHALATION) at 08:04

## 2022-07-06 RX ADMIN — TRAZODONE HYDROCHLORIDE 100 MG: 50 TABLET ORAL at 21:03

## 2022-07-06 RX ADMIN — ACETAMINOPHEN 975 MG: 325 TABLET, FILM COATED ORAL at 04:07

## 2022-07-06 ASSESSMENT — ACTIVITIES OF DAILY LIVING (ADL)
ADLS_ACUITY_SCORE: 32
ADLS_ACUITY_SCORE: 29

## 2022-07-06 NOTE — PROGRESS NOTES
[facility]    Cardiology Progress Note        Date of Admission:  6/17/2022     Assessment & Plan: HVSL   61-year-old man with history of ischemic cardiomyopathy LVEF 15%) NYHA class IV symptoms ACC stage D, multiple admissions with heart failure over the past several months. Presented with cardiogenic shock c/b multi-organ failure (JOSE; Acute liver injury/shock liver) requiring mechanical hemodynamic support. Now listed as status 2 for OHT. Course c/b large epistaxis 6/22 with 400 ml blood. Had subclavian IABP placed instead of femoral IABP 6/23/22, adjusted 7/3/22 due to coiling in aortic arch that was asymptomatic and without pulse changes in extremities. Hemoglobin gradually downtrending though recently stable without e/o further e/o bleeding. Continue to optimize his hemodynamics while awaiting LVAD/transplant.     Updates:  - continue IABP at 1:1  - Tentatively plan for LVAD later this week      Plan: Critical Care      ===============  CARDIOVASCULAR  ===============  # Ambulatory cardiogenic shock SCAI D  # Advanced ischemic cardiomyopathy, Class IV, Stage D  # s/p CRT-D (Medtronic 2006, gen change 2012)  # SVT  # Nonsustained VT  # CAD s/p PCI to LAD (2005)  # Hx of MI (2007)  # Severe ostial LAD disease with in-stent restenosis of mid LAD at diag bifurcation, severe proximal RCA disease, mild circ disease now s/p ostial LAD and proximal RCA lithotripsy and stenting on 5/24/22  CMRI showing infarcted myocardium in LAD territory. Given the degree of LV dysfunction/dilation, moderate to severe functional MR and lack of viable myocardium, cardiac surgery would be high risk. Now s/p ostial LAD and proximal RCA lithotripsy and stenting on 5/24/22. RHC 7/1 to reassess hemodynamics, results as below. Being evaluated for transplant/LVAD, PRA returned with reaction with 97% of antibodies in panel, high risk of acute rejection. Ongoing discussion with immunology  regarding any potential reasons for particularly high PRA, suggested oncology consult for evaluation of possible malignancy causing polyclonal activation. CT Chest/Abdomen/Pelvis without any evidence of new malignancy. Oncology consulted.      RHC 7/1:  RA 6  RV 52/7  PA 53/26 (35)  PCWP 22  [place] CO 3.63, CI 2.06,   TD CO 3.87, CI 2.19  PVR by TD CO 3.3     - Mechanical support: IABP 1:1, continuing (placed 6/23/22)  - Anticoagulation: heparin gtt DVT/PE in setting of APL  - Volume status: euvolemic and making adequate urine; no diuretics today   - BB: contraindicated  - ACEi/ARB/ARNI: none  - MRA: none  - SGLT2i: none  - SCD prophylaxis: Medtronic CRT-D      LVAD/transplant workup started, discussion with patient:  - Echocardiogram done  - Cardiopulmonary stress testing ordered  - Infectious labs: Quant Gold (-), VZV IgG (+), Treponema Abs w/reflex RPR/titer (-), Toxoplasma IgG (-), Hepatitis serology (all negative, HepB nonimmune), HSV1/2 IgG (-), HIV combo (-), EBV/CMV IgG (both positive)  - Other labs: blood type (O positive), iron studies, prealbumin 22, UA done, TSH 1.65, PRA single antigen IgG antibody (97%)  - Substance abuse screen: PEth (-), UDS (-)  - RHC done, current hemodynamic monitoring  - Imaging: CT Chest A/P with Chronic interstitial subpleural fibrotic disease with paraseptal emphysematous change, US abdomen complete w/doppler , US JOE Doppler, US LE arterial duplex b/l all unremarkable  - PFTs, 6 Minute Walk Test not done  - Consults: Dental, CVTS, Palliative Care, Nutrition, Neuropsych, Social Work Dental consult  - CVTS consulted  - elevated PRA to 97; discussing with immunology and oncology possible etiologies for this; overall unclear why such significant elevation vs prior     ===========   PULMONARY  ===========  # Mild-Moderate Emphysema  # Hx of COVID-19  Former smoker 2 ppd for 40 years quit on 09/09/09.  - Appreciate pulmonary consultation given emphysema  - ILD panel:                            - autoimmune work- up:                                      - LASHANDA positive                                      - Anti Helen-1 IgG negative                                      - SSA RO, SSB LA Ab negative                                      - Scleroderma ab scl 70 negative                                      - RF negative                                      - anti CCP 1.7                                      - ANCA IgG < 1:10                                      - Aldolase: 19.6(elevated)              - no need for PFTs with DLCO                                     - if symptoms are considered related to pulm findings: inhaler therapy with LABA/LAMA combination     =====  Renal  =====  -Strict I&O while diuresing   -Monitor Electrolytes while actively diuresing K->4 Mg->2     ===================   GASTROENTEROLOGY  ===================  # Severe malnutrition in the context of chronic illness  # Liver injury, in the setting of cardiogenic shock  # GERD  - monitor  - Diuresis as above  - Supplements to help maintain nutrition.  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  - continue low intensity heparin gtt     # Anemia, acute on chronic  # Iron deficiency  Baseline hgb 8-9. Started to downtrend since nose bleed 6/22. Gradually downtrending without signs of new or ongoing bleeding. Found to have iron deficiency.   - defer transfusion at this time; goal Hgb 6.5              - transfuse 2 units if hgb < 6.5, nonirradiated  - S/p iron IV *3 doses [id number]  - Restart ferrous sulfate 325 mg daily  - continue pediatric tubes for labs, limited  blood draw  - monitor for signs of bleeding     # Profuse epistaxis from the left nare with bloody emesis  ~ 400 ml of bloody emesis in the blue bag on arrival. Also had one severe epistaxis that occurred at [place] requiring cauterization.  - Left nasal cavity packed with all absorbable packing - surgifoam + surgicel + surgiflo. No antibiotics are indicated due to absorbable packing.  - saline nasal pray q3h  - if begins to  in quantity of bleeding would recommend spraying the nasal cavity copiously with afrin and holding pressure over the soft part of the nose for 20 minutes continuously. Nasal clips are ineffective and should not be used in place of digital pressure. If bleeding continues despite these measures, repeat. If bleeding continues or is severe please contact ENT.     ==   ID  ==  ROHIT     ===============  RHEUMATOLOGY  ===============  #Lupus  # APL  - Continue pta hydroxychloroquine 200mg bid   - PTA mycophenolic acid 1500mg q12h on hold in anticipation for LVAD  -- Holding MMF one week prior to surgery and re-starting 5-7 days after LVAD placement in the absence of surgical site infection, poor wound healing or infection elsewhere.     ===    ===  - Continue pta tamsulosin 0.4mg daily       Lines/ tubes/ drains:  Fitzgerald Catheter: Not present  Central Lines: PRESENT  PICC Triple Lumen 06/17/22 Right Brachial vein medial-Site Assessment: WDL     Prophylaxis:  - DVT Prophylaxis: heparin gtt (on for DVT/PE in setting of APL)  - PUD Prophylaxis: pantoprazole     Code Status: Full Code       Disposition:  - ICU    The patient's care was discussed with the Attending Physician, Dr. Stewart, Bedside Nurse and Patient.    Emelyn Meneses MD  [facility]    ______________________________________________________________________    Interval History   Nursing notes reviewed. BRAXTON. Got some better sleep overnight. States that he has no concerns. Is looking  forward to chatting with VAD coordinator later today. Looking forward to taking a walk.     Data reviewed today: I reviewed all medications, new labs and imaging results over the last 24 hours. I personally reviewed the chest x-ray image(s) showing stable IABP     Physical Exam   Vital Signs: Temp: 98.8  F (37.1  C) Temp src: Axillary BP: 93/52 Pulse: 74   Resp: 15 SpO2: 96 % O2 Device: None (Room air)    Weight: 133 lbs 6.05 oz  General Appearance: Male in NAD resting in bed, polite and conversational  Respiratory: ctab on RA, nonlabored  Cardiovascular: rrr, s1, s2, no murmur  GI: soft, nt/nd  Skin: warm, well perfused, IABP site c/d/i without swelling/hematoma  Neuro: alert, interactive, speech fluent, moving all 4     Data   Recent Labs   Lab 07/06/22  0346 07/05/22  0404 07/04/22  1036 07/04/22  0629 07/04/22  0423   WBC 6.6 5.9  --  5.8 4.3   HGB 9.2* 8.8*  --  8.4* 10.4*   MCV 92 91  --  93 92    362  --  320 283   INR 1.12 1.28*  --   --  1.26*   * 130* 134 125*  --    POTASSIUM 4.5 4.5 5.7* 4.6  --    CHLORIDE 101 97* 106 94*  --    CO2 22 21* 24 20*  --    BUN 14.7 14.7 13 11.8  --    CR 0.74 0.71 0.61* 0.62*  --    ANIONGAP 11 12 4 11  --    ELDA 9.5 9.1 9.4 8.9  --    GLC 87 204* 116* 86  --    ALBUMIN 3.4* 3.2*  --  3.2*  --    PROTTOTAL 7.0 6.5  --  6.2*  --    BILITOTAL 0.3 0.3  --  0.3  --    ALKPHOS 125 120  --  111  --    ALT 39 41  --  42  --    AST 30 24  --  24  --      Recent Results (from the past 24 hour(s))   XR Chest Port 1 View    Narrative    Chest one view portable 50 degrees upright    HISTORY: [place] position    COMPARISON STUDY: 7/4/2022    FINDINGS: Cardiac silhouette is nonenlarged. Coronary stent. Left  transvenous 3-lead implantable cardiac defibrillator. Right PICC with  tip in the mid SVC. Subclavian intra-aortic balloon pump in the high  descending aorta    Interstitial opacities bilaterally.      Impression    IMPRESSION: No evidence of [place]-Janae catheter. Mild  interstitial  opacities bilaterally, chronic.    GABRIEL COURTNEY MD         SYSTEM ID:  [id number]   XR Chest Port 1 View    Narrative    EXAM: XR CHEST PORT 1 VIEW  7/6/2022 12:31 AM     HISTORY:  iabp positioning       COMPARISON:  Chest radiograph, 7/3/2022    FINDINGS  Technique: Semiupright AP view of the chest.     Devices: Intra-aortic balloon pump superior marker projects over the  high descending thoracic aorta, unchanged. Left chest 3-lead cardiac  pacer/ICD, unchanged. Right PICC terminates over the low SVC. Left  sided coronary stent.    No new focal airspace opacity.  No discernible pneumothorax.  No  pleural effusion.     Cardiomediastinal silhouette is  stable in size. Surgical clips  projected over the right axilla.      Impression    IMPRESSION:   1. Stable support devices.  2. No focal consolidation, pneumothorax, or pleural effusion.    I have personally reviewed the examination and initial interpretation  and I agree with the findings.    LAI HERNADEZ MD         SYSTEM ID:  [id number]     Medications     heparin 1,150 Units/hr (07/06/22 0600)       aspirin  81 mg Oral Daily     DULoxetine  30 mg Oral Daily     ferrous sulfate  325 mg Oral Daily     fluticasone-vilanterol  1 puff Inhalation Daily     hydroxychloroquine  200 mg Oral BID     magnesium oxide  400 mg Oral Q4H     melatonin  10 mg Oral At Bedtime     polyethylene glycol  17 g Oral Daily     senna-docusate  2 tablet Oral BID     sodium chloride  2 spray Both Nostrils Q3H     tamsulosin  0.4 mg Oral Daily     thiamine  100 mg Oral Daily     traZODone  100 mg Oral At Bedtime

## 2022-07-06 NOTE — PROGRESS NOTES
LVAD/Transplant Social Work Services Progress Note      Date of Initial Social Work Evaluation: 5/18/2022 with admission assessment completed 6/20/2022  Collaborated with: Pt and his dtr, Asha, via phone ([phone])    Data: Pt currently listed status 2 for heart transplant. If there is no heart offer by Friday, pt is scheduled for LVAD on Friday. Pt is very accepting of his situation and was eager to review LVAD education this afternoon. Pt also looking forward to one of our LVAD patient's, whom is also a [employer] Volunteer, to come and visit with him tomorrow.     Intervention: Supportive Visit, Arranged for visit with another LVAD pt tomorrow  Assessment: Pt appears to be coping well with situation and plan for LVAD on Friday, if he does not get a heart offer. Pt is excited to meet w/ another LVAD pt tomorrow.   Pt's dtr planning to leave tomorrow as she is nearing her due date for her baby. Pt's son will be here starting tomorrow. Dtr would still like to be the primary contact.   Education provided by SW: Ongoing Social Work support   Plan:    Discharge Plans in Progress: will assess after surgery    Barriers to d/c plan: Medical Readiness     Follow up Plan: SW to continue to follow.

## 2022-07-06 NOTE — PLAN OF CARE
Neurology:  AO x4, Pulses doppler, no fever, minimal back pain.     Cardiovascular: IABP R subclavian. 1:1 % No alarms today. XRAY taken and position confirmed. Stable BP. PPM ICD AAI-% Sinus with 1 degree AV block and BBB.      Pulmonary: RA     Gastrointestinal, Nutrition: 2L fluid and low NA diet        Renal, Electrolytes: Replaced Mag this AM. Re-check with morning labs. Void with urinal. Net negative today.       Infectious Disease: ID following as well as ONC. Unknown Etiology of antibody markers present.       Hematology: stable heme and platelets       Endocrinology: No insulin. Reg diet.       MSK/Skin: Lower back pain. Mobile in hallway today with minimal assistance. Skin CDI. Old R fem site. Old PPM site. Scattered bruising.      Lines/Drains: R Brach PICC TL. R subclavian IABP. Stat lock and sutured x2 L PIV x1        Plan: Status 2A Heart transplant. LVAD Friday AM 7/8. Promote independence. LVAD coordinator will meet with patient and family tomorrow.               All questions from family and patient have been addressed  All safety checks complete  MD aware of all patient status changes and updated promptly  See chart for all other details

## 2022-07-06 NOTE — PLAN OF CARE
"      Alert and oriented x 4. PERRL.Slept good between oxycodone doses. C/O back pain throughout the night but it got worse around 0330. Patient stated that he had sharp pain with each inspiration and exhalation \"inside the muscles in his lower back\". He was moaning and groaning and appeared quite uncomfortable. Vital signs were unchanged during the episode. Writer offered repositioning or getting him up to the chair but he declined getting up. He tried laying on his side but it didn't help. MD was at bedside and offered to try heating pad and Voltaren gel. Extra dose of oxycodone was given along with Tylenol and patient felt much better afterwards.He declined heating pad when it arrived since he was feeling much better. Sinus rhythm with no ectopy noted. No pacing noted. HR 70s-80s. MAPs mostly in the 70s. CVP=8 Afebrile. No IABP alarms. Good urine output. BS are present. Replacing magnesium this am.                " Preoperative History and Physical    SURGEON:  Dr. Jono Urbina    PROCEDURE:  L3 4 laminectomy    DATE OF PROCEDURE:  Diana 3,2019    FACILITY:   [facility]    HISTORY OF PRESENT ILLNESS:  The patient is a 49 year old male who is being evaluated pre-operatively at the request of Dr. Jono Urbina. Is had chronic left leg pain for which she's had a number of tests done, which may possibly be due to a radiculopathy for which she is to have the above procedure done. He has failed conservative treatment.  . He is had a minor surgical procedure done in the past. He denies any known bleeding or clotting abnormalities. Anesthetic complications or family history of such. He is well at this time. He denies any cold or flu symptoms, headaches, fevers, chills, chest pain, shortness of breath, PND, orthopnea or ankle edema    ALLERGIES    ALLERGIES:  No Known Allergies    CURRENT MEDICATIONS    Current Outpatient Medications   Medication Sig Dispense Refill   • aspirin 81 MG tablet Take 81 mg by mouth daily.     • traMADol (ULTRAM) 50 MG tablet Take 50 mg by mouth as needed for Pain.     • levothyroxine (SYNTHROID, LEVOTHROID) 88 MCG tablet Take 1 tablet by mouth daily. 90 tablet 1   • losartan (COZAAR) 100 MG tablet Take 1 tablet by mouth daily. 90 tablet 1   • hydrochlorothiazide (HYDRODIURIL) 25 MG tablet Take 1 tablet by mouth daily. SCHEDULE APPT FOR REFILLS 90 tablet 1   • NON FORMULARY 6 each.     • gabapentin (NEURONTIN) 100 MG capsule Take 1 capsule by mouth at bedtime. (Patient taking differently: Take 300 mg by mouth 3 times daily. ) 30 capsule 1   • Omega 3 1000 MG capsule Take 2,000 mg by mouth daily.     • Cholecalciferol (D3-1000) 1000 units capsule Take 5 capsules by mouth daily.     • atorvastatin (LIPITOR) 40 MG tablet Take 1 tablet by mouth daily. SCHEDULE APPT FOR REFILLS 90 tablet 1     No current facility-administered medications for this visit.        MEDICAL HISTORY    History reviewed. No pertinent  past medical history.    SURGICAL HISTORY    Past Surgical History:   Procedure Laterality Date   • Colonoscopy  November 25, 2014     Normal except for anal fissure       FAMILY HISTORY    History reviewed. No pertinent family history.    SOCIAL HISTORY    Social History     Tobacco Use   • Smoking status: Never Smoker   • Smokeless tobacco: Never Used   Substance Use Topics   • Alcohol use: Yes     Alcohol/week: 0.6 oz     Types: 1 Standard drinks or equivalent per week   • Drug use: No       REVIEW OF SYSTEMS:  Constitutional: Denies fevers. Denies chills. Denies tiredness or malaise.   Eyes: Denies change in visual acuity. Denies eye pain. Denies eye burning. Denies eye itching.   Immunologic: Denies hives or seasonal allergies.   HENT: Denies sinus problems. Denies ear infections. Denies nasal congestion. Denies nose bleeding. Denies gingival bleeding. Denies sore throat.   Respiratory: Denies cough or shortness of breath. Denies history of problems with intubation or anesthesia.  Cardiovascular: Denies chest pain. No edema. No syncopal episodes.   Gastrointestinal: Denies abdominal pain, nausea, vomiting, bloody or dark stools or diarrhea.  Genitourinary: Denies urine retention, painful urination, urinary frequency, blood in urine or nocturia.   Musculoskeletal: Left leg pain as above   Integument: Denies rash or itching.   Neurologic: Denies headache, focal weakness or sensory changes.   Endocrine: Denies polyuria, polydipsia or temperature intolerance.   Lymphatic: Denies swollen glands or weight loss. All other systems reviewed and negative.    PHYSICAL EXAM    VITAL SIGNS:   Vitals:    05/07/19 1444   BP: 122/82   Pulse: 72   Temp: 98.2 °F (36.8 °C)   TempSrc: Oral   Weight: (!) 138.3 kg   Height: 5' 9\" (1.753 m)     Constitutional: A+O (alert and oriented) x3. In NAD (no acute distress).  HENT: Normocephalic. Atraumatic. Bilateral external ears normal. Oropharynx moist. No oral exudates. No tonsillar or  uvular edema. Nose normal.   Neck: Normal range of motion. No tenderness. Supple. No stridor.    Eyes: PERRL (pupils equal, round, and reactive to light), EOMI (extraocular movements intact). Conjunctivae normal. No discharge.    Cardiovascular: Normal rate. Normal rhythm. No murmurs, gallops, or rubs.  No peripheral edema.  Respiratory: No respiratory distress. Normal breath sounds. No rales. No wheezing.    Gastrointestinal: Bowel sounds normal. Soft. No tenderness. No masses. No pulsatile masses.    Integument: Warm. Dry. No erythema. No rash.    Musculoskeletal: No cyanosis. No clubbing.   Neurologic: Alert and oriented x3. Normal motor function. Normal sensory function. No focal deficits noted.      Electrocardiogram:  See attached.    Labs:  Pending.    Assessment    49 year old male with planned surgery as above.        Patient is currently stable, medically optimized, and acceptable medical risk for planned surgery.      plan    Orders Placed This Encounter   • aspirin 81 MG tablet     1. Preoperative workup as follows CBC, BMP, UA, ECG, chest x-ray  2. Change in medication regimen before surgery:  He is asked to avoid aspirin and stress for one week prior to this procedure      Please carbon copy to Dr. Jono Urbina    Instructions provided as documented in the after visit summary.

## 2022-07-07 ENCOUNTER — DOCUMENTATION ONLY (OUTPATIENT)
Dept: TRANSPLANT | Facility: CLINIC | Age: 62
End: 2022-07-07

## 2022-07-07 ENCOUNTER — ANESTHESIA EVENT (OUTPATIENT)
Dept: SURGERY | Facility: CLINIC | Age: 62
DRG: 001 | End: 2022-07-07
Payer: COMMERCIAL

## 2022-07-07 ENCOUNTER — APPOINTMENT (OUTPATIENT)
Dept: GENERAL RADIOLOGY | Facility: CLINIC | Age: 62
DRG: 001 | End: 2022-07-07
Attending: STUDENT IN AN ORGANIZED HEALTH CARE EDUCATION/TRAINING PROGRAM
Payer: COMMERCIAL

## 2022-07-07 DIAGNOSIS — Z76.82 AWAITING ORGAN TRANSPLANT: Primary | ICD-10-CM

## 2022-07-07 LAB
ABO/RH(D): NORMAL
ALBUMIN SERPL BCG-MCNC: 3.5 G/DL (ref 3.5–5.2)
ALP SERPL-CCNC: 125 U/L (ref 40–129)
ALT SERPL W P-5'-P-CCNC: 41 U/L (ref 10–50)
ANION GAP SERPL CALCULATED.3IONS-SCNC: 14 MMOL/L (ref 7–15)
ANTIBODY SCREEN: NEGATIVE
AST SERPL W P-5'-P-CCNC: 32 U/L (ref 10–50)
BASE EXCESS BLDV CALC-SCNC: 0.5 MMOL/L (ref -7.7–1.9)
BILIRUB SERPL-MCNC: 0.3 MG/DL
BUN SERPL-MCNC: 13.2 MG/DL (ref 8–23)
CALCIUM SERPL-MCNC: 9.3 MG/DL (ref 8.8–10.2)
CHLORIDE SERPL-SCNC: 98 MMOL/L (ref 98–107)
CREAT SERPL-MCNC: 0.68 MG/DL (ref 0.67–1.17)
DEPRECATED HCO3 PLAS-SCNC: 23 MMOL/L (ref 22–29)
ERYTHROCYTE [DISTWIDTH] IN BLOOD BY AUTOMATED COUNT: 15.5 % (ref 10–15)
GFR SERPL CREATININE-BSD FRML MDRD: >90 ML/MIN/1.73M2
GLUCOSE SERPL-MCNC: 95 MG/DL (ref 70–99)
HCO3 BLDV-SCNC: 26 MMOL/L (ref 21–28)
HCT VFR BLD AUTO: 31 % (ref 40–53)
HGB BLD-MCNC: 9.6 G/DL (ref 13.3–17.7)
HGB FREE PLAS-MCNC: 110 MG/DL
INR PPP: 1.13 (ref 0.85–1.15)
LDH SERPL L TO P-CCNC: 259 U/L (ref 0–250)
MAGNESIUM SERPL-MCNC: 1.9 MG/DL (ref 1.7–2.3)
MCH RBC QN AUTO: 28.4 PG (ref 26.5–33)
MCHC RBC AUTO-ENTMCNC: 31 G/DL (ref 31.5–36.5)
MCV RBC AUTO: 92 FL (ref 78–100)
O2/TOTAL GAS SETTING VFR VENT: 21 %
OXYHGB MFR BLDV: 59 % (ref 70–75)
PCO2 BLDV: 41 MM HG (ref 40–50)
PH BLDV: 7.4 [PH] (ref 7.32–7.43)
PLATELET # BLD AUTO: 385 10E3/UL (ref 150–450)
PO2 BLDV: 35 MM HG (ref 25–47)
POTASSIUM SERPL-SCNC: 4.4 MMOL/L (ref 3.4–5.3)
PROT SERPL-MCNC: 7 G/DL (ref 6.4–8.3)
RBC # BLD AUTO: 3.38 10E6/UL (ref 4.4–5.9)
SARS-COV-2 RNA RESP QL NAA+PROBE: NEGATIVE
SODIUM SERPL-SCNC: 135 MMOL/L (ref 136–145)
SPECIMEN EXPIRATION DATE: NORMAL
UFH PPP CHRO-ACNC: 0.41 IU/ML
UFH PPP CHRO-ACNC: 0.43 IU/ML
WBC # BLD AUTO: 6.2 10E3/UL (ref 4–11)

## 2022-07-07 PROCEDURE — 250N000011 HC RX IP 250 OP 636: Performed by: INTERNAL MEDICINE

## 2022-07-07 PROCEDURE — 99291 CRITICAL CARE FIRST HOUR: CPT | Mod: GC | Performed by: INTERNAL MEDICINE

## 2022-07-07 PROCEDURE — 85027 COMPLETE CBC AUTOMATED: CPT | Performed by: STUDENT IN AN ORGANIZED HEALTH CARE EDUCATION/TRAINING PROGRAM

## 2022-07-07 PROCEDURE — 80053 COMPREHEN METABOLIC PANEL: CPT | Performed by: STUDENT IN AN ORGANIZED HEALTH CARE EDUCATION/TRAINING PROGRAM

## 2022-07-07 PROCEDURE — 250N000013 HC RX MED GY IP 250 OP 250 PS 637: Performed by: STUDENT IN AN ORGANIZED HEALTH CARE EDUCATION/TRAINING PROGRAM

## 2022-07-07 PROCEDURE — 86832 HLA CLASS I HIGH DEFIN QUAL: CPT | Performed by: INTERNAL MEDICINE

## 2022-07-07 PROCEDURE — 86833 HLA CLASS II HIGH DEFIN QUAL: CPT | Performed by: INTERNAL MEDICINE

## 2022-07-07 PROCEDURE — 200N000002 HC R&B ICU UMMC

## 2022-07-07 PROCEDURE — 86923 COMPATIBILITY TEST ELECTRIC: CPT | Performed by: STUDENT IN AN ORGANIZED HEALTH CARE EDUCATION/TRAINING PROGRAM

## 2022-07-07 PROCEDURE — 71045 X-RAY EXAM CHEST 1 VIEW: CPT | Mod: 26 | Performed by: RADIOLOGY

## 2022-07-07 PROCEDURE — 82805 BLOOD GASES W/O2 SATURATION: CPT | Performed by: STUDENT IN AN ORGANIZED HEALTH CARE EDUCATION/TRAINING PROGRAM

## 2022-07-07 PROCEDURE — 250N000011 HC RX IP 250 OP 636: Performed by: THORACIC SURGERY (CARDIOTHORACIC VASCULAR SURGERY)

## 2022-07-07 PROCEDURE — 71045 X-RAY EXAM CHEST 1 VIEW: CPT

## 2022-07-07 PROCEDURE — 85520 HEPARIN ASSAY: CPT | Performed by: THORACIC SURGERY (CARDIOTHORACIC VASCULAR SURGERY)

## 2022-07-07 PROCEDURE — 99207 PR NO CHARGE LOS: CPT | Performed by: INTERNAL MEDICINE

## 2022-07-07 PROCEDURE — 36592 COLLECT BLOOD FROM PICC: CPT | Performed by: STUDENT IN AN ORGANIZED HEALTH CARE EDUCATION/TRAINING PROGRAM

## 2022-07-07 PROCEDURE — 83615 LACTATE (LD) (LDH) ENZYME: CPT | Performed by: PHYSICIAN ASSISTANT

## 2022-07-07 PROCEDURE — 85610 PROTHROMBIN TIME: CPT | Performed by: INTERNAL MEDICINE

## 2022-07-07 PROCEDURE — 83735 ASSAY OF MAGNESIUM: CPT | Performed by: INTERNAL MEDICINE

## 2022-07-07 PROCEDURE — 250N000009 HC RX 250: Performed by: PHYSICIAN ASSISTANT

## 2022-07-07 PROCEDURE — 86850 RBC ANTIBODY SCREEN: CPT | Performed by: PHYSICIAN ASSISTANT

## 2022-07-07 PROCEDURE — 86825 HLA X-MATH NON-CYTOTOXIC: CPT | Performed by: INTERNAL MEDICINE

## 2022-07-07 PROCEDURE — 250N000013 HC RX MED GY IP 250 OP 250 PS 637: Performed by: INTERNAL MEDICINE

## 2022-07-07 PROCEDURE — 86923 COMPATIBILITY TEST ELECTRIC: CPT | Performed by: INTERNAL MEDICINE

## 2022-07-07 PROCEDURE — 86901 BLOOD TYPING SEROLOGIC RH(D): CPT | Performed by: PHYSICIAN ASSISTANT

## 2022-07-07 PROCEDURE — 999N000075 HC STATISTIC IABP MONITORING

## 2022-07-07 PROCEDURE — 250N000013 HC RX MED GY IP 250 OP 250 PS 637

## 2022-07-07 PROCEDURE — U0005 INFEC AGEN DETEC AMPLI PROBE: HCPCS | Performed by: PHYSICIAN ASSISTANT

## 2022-07-07 RX ORDER — LEVOFLOXACIN 5 MG/ML
500 INJECTION, SOLUTION INTRAVENOUS SEE ADMIN INSTRUCTIONS
Status: DISCONTINUED | OUTPATIENT
Start: 2022-07-08 | End: 2022-07-08 | Stop reason: HOSPADM

## 2022-07-07 RX ORDER — VANCOMYCIN HYDROCHLORIDE 1 G/200ML
1000 INJECTION, SOLUTION INTRAVENOUS SEE ADMIN INSTRUCTIONS
Status: DISCONTINUED | OUTPATIENT
Start: 2022-07-08 | End: 2022-07-08 | Stop reason: HOSPADM

## 2022-07-07 RX ORDER — VANCOMYCIN HYDROCHLORIDE 1 G/200ML
1000 INJECTION, SOLUTION INTRAVENOUS
Status: COMPLETED | OUTPATIENT
Start: 2022-07-08 | End: 2022-07-08

## 2022-07-07 RX ORDER — MUPIROCIN 20 MG/G
OINTMENT TOPICAL 2 TIMES DAILY
Status: DISCONTINUED | OUTPATIENT
Start: 2022-07-07 | End: 2022-07-08 | Stop reason: HOSPADM

## 2022-07-07 RX ORDER — FLUCONAZOLE 2 MG/ML
200 INJECTION, SOLUTION INTRAVENOUS
Status: COMPLETED | OUTPATIENT
Start: 2022-07-08 | End: 2022-07-08

## 2022-07-07 RX ORDER — LEVOFLOXACIN 5 MG/ML
500 INJECTION, SOLUTION INTRAVENOUS
Status: COMPLETED | OUTPATIENT
Start: 2022-07-08 | End: 2022-07-08

## 2022-07-07 RX ORDER — MAGNESIUM SULFATE HEPTAHYDRATE 40 MG/ML
2 INJECTION, SOLUTION INTRAVENOUS ONCE
Status: COMPLETED | OUTPATIENT
Start: 2022-07-07 | End: 2022-07-07

## 2022-07-07 RX ADMIN — SENNOSIDES AND DOCUSATE SODIUM 2 TABLET: 8.6; 5 TABLET ORAL at 20:06

## 2022-07-07 RX ADMIN — SALINE NASAL SPRAY 2 SPRAY: 1.5 SOLUTION NASAL at 15:56

## 2022-07-07 RX ADMIN — HYDROXYCHLOROQUINE SULFATE 200 MG: 200 TABLET, FILM COATED ORAL at 08:58

## 2022-07-07 RX ADMIN — TAMSULOSIN HYDROCHLORIDE 0.4 MG: 0.4 CAPSULE ORAL at 08:58

## 2022-07-07 RX ADMIN — MAGNESIUM SULFATE IN WATER 2 G: 40 INJECTION, SOLUTION INTRAVENOUS at 05:28

## 2022-07-07 RX ADMIN — SALINE NASAL SPRAY 2 SPRAY: 1.5 SOLUTION NASAL at 05:28

## 2022-07-07 RX ADMIN — FLUTICASONE FUROATE AND VILANTEROL TRIFENATATE 1 PUFF: 100; 25 POWDER RESPIRATORY (INHALATION) at 08:59

## 2022-07-07 RX ADMIN — Medication 10 MG: at 21:00

## 2022-07-07 RX ADMIN — MUPIROCIN: 20 OINTMENT TOPICAL at 20:06

## 2022-07-07 RX ADMIN — ASPIRIN 81 MG CHEWABLE TABLET 81 MG: 81 TABLET CHEWABLE at 08:58

## 2022-07-07 RX ADMIN — ACETAMINOPHEN 975 MG: 325 TABLET, FILM COATED ORAL at 19:17

## 2022-07-07 RX ADMIN — HEPARIN SODIUM 1300 UNITS/HR: 10000 INJECTION, SOLUTION INTRAVENOUS at 20:54

## 2022-07-07 RX ADMIN — SALINE NASAL SPRAY 2 SPRAY: 1.5 SOLUTION NASAL at 20:06

## 2022-07-07 RX ADMIN — POLYETHYLENE GLYCOL 3350 17 G: 17 POWDER, FOR SOLUTION ORAL at 08:58

## 2022-07-07 RX ADMIN — SALINE NASAL SPRAY 2 SPRAY: 1.5 SOLUTION NASAL at 08:58

## 2022-07-07 RX ADMIN — HYDROXYCHLOROQUINE SULFATE 200 MG: 200 TABLET, FILM COATED ORAL at 20:06

## 2022-07-07 RX ADMIN — SALINE NASAL SPRAY 2 SPRAY: 1.5 SOLUTION NASAL at 17:57

## 2022-07-07 RX ADMIN — SENNOSIDES AND DOCUSATE SODIUM 2 TABLET: 8.6; 5 TABLET ORAL at 08:58

## 2022-07-07 RX ADMIN — TRAZODONE HYDROCHLORIDE 100 MG: 50 TABLET ORAL at 20:06

## 2022-07-07 RX ADMIN — DULOXETINE 30 MG: 30 CAPSULE, DELAYED RELEASE ORAL at 08:58

## 2022-07-07 RX ADMIN — THIAMINE HCL TAB 100 MG 100 MG: 100 TAB at 08:58

## 2022-07-07 RX ADMIN — FERROUS SULFATE TAB 325 MG (65 MG ELEMENTAL FE) 325 MG: 325 (65 FE) TAB at 08:58

## 2022-07-07 ASSESSMENT — ACTIVITIES OF DAILY LIVING (ADL)
ADLS_ACUITY_SCORE: 32

## 2022-07-07 ASSESSMENT — ENCOUNTER SYMPTOMS
DYSRHYTHMIAS: 1
ORTHOPNEA: 1

## 2022-07-07 ASSESSMENT — NEW YORK HEART ASSOCIATION (NYHA) CLASSIFICATION: NYHA FUNCTIONAL CLASS: IV

## 2022-07-07 ASSESSMENT — COPD QUESTIONNAIRES: COPD: 1

## 2022-07-07 NOTE — PLAN OF CARE
Patient has denied any pain throughout the day. IABP with no alarms. Refer to flowsheet for specific numbers. Ambulating with stand-by-assist and will continue to encourage activity. Tolerating oral diet with no problems. Plan for LVAD tomorrow.

## 2022-07-07 NOTE — ANESTHESIA PREPROCEDURE EVALUATION
Anesthesia Pre-Procedure Evaluation    Patient: Dandy Sands   MRN: [id number] : 1960        Procedure : Procedure(s):  MINIMALLY INVASIVE INSERTION, LEFT VENTRICULAR ASSIST DEVICE (HEARTMATE III) AND ASSOCIATED PROCEDURES          No past medical history on file.   Past Surgical History:   Procedure Laterality Date     COLONOSCOPY N/A 2022    Procedure: COLONOSCOPY;  Surgeon: Parth Meneses MD;  Location: [facility] OR     CV CORONARY ANGIOGRAM N/A 2022    Procedure: Coronary Angiogram;  Surgeon: Mike Pope MD;  Location: [facility]     CV CORONARY ANGIOGRAM N/A 2022    Procedure: Coronary Angiogram;  Surgeon: Austin Bright MD;  Location: [facility]     CV CORONARY LITHOTRIPSY PCI Bilateral 2022    Procedure: Percutaneous Coronary Intervention - Lithotripsy;  Surgeon: Mike Pope MD;  Location: [facility]     CV INTRA AORTIC BALLOON N/A 2022    Procedure: Intraprocedure Aortic Balloon Pump Insertion;  Surgeon: Sherman Cerda MD;  Location: [facility]     CV INTRA AORTIC BALLOON Right 7/3/2022    Procedure: Reposition of Subclavian Intraprocedure Aortic Balloon Pump;  Surgeon: Austin Bright MD;  Location:  HEART CARDIAC CATH LAB     CV INTRAVASULAR ULTRASOUND N/A 2022    Procedure: Intravascular Ultrasound;  Surgeon: Mike Pope MD;  [facility]     CV PCI ANGIOPLASTY N/A 2022    Procedure: Percutaneous Transluminal Angioplasty;  Surgeon: Austin Bright MD;  [facility]     CV PCI STENT DRUG ELUTING N/A 2022    Procedure: Percutaneous Coronary Intervention Stent;  Surgeon: Mike Pope MD;  Location:  HEART CARDIAC CATH LAB     CV RIGHT HEART CATH MEASUREMENTS RECORDED N/A 2022    Procedure: Right Heart Catheterization;  Surgeon: Justo Stewart MD;  Location:  HEART CARDIAC CATH LAB     CV RIGHT HEART  CATH MEASUREMENTS RECORDED N/A 5/24/2022    Procedure: Right Heart Catheterization;  Surgeon: Mike Pope MD;  [facility]     CV RIGHT HEART CATH MEASUREMENTS RECORDED N/A 5/29/2022    Procedure: Right Heart Catheterization;  Surgeon: Austin Bright MD;  [facility]     CV RIGHT HEART CATH MEASUREMENTS RECORDED N/A 7/1/2022    Procedure: Right Heart Catheterization;  Surgeon: Mike Pope MD;  [facility]     CV RIGHT HEART EXERCISE STRESS STUDY N/A 05/18/2022    Procedure: Stress Drug Study;  Surgeon: Justo Stewart MD;  Location:  HEART CARDIAC CATH LAB     CV SWAN CHOCO PROCEDURE N/A 6/17/2022    Procedure: [place] Choco Procedure;  Surgeon: Sherman Cerda MD;  Location:  HEART CARDIAC CATH LAB     INSERT INTRAAORTIC BALLOON PUMP Right 6/23/2022    Procedure: INSERTION, INTRA-AORTIC BALLOON PUMP RIGHT SUBCLAVIAN ARTERY.;  Surgeon: Hayden Versa MD;  Location: [facility] OR     PICC DOUBLE LUMEN PLACEMENT Right 05/28/2022    right basilic 5 fr dl picc 40 cm      No Known Allergies   Social History     Tobacco Use     Smoking status: Not on file     Smokeless tobacco: Not on file   Substance Use Topics     Alcohol use: Not on file      Wt Readings from Last 1 Encounters:   07/07/22 63 kg (138 lb 14.2 oz)        Anesthesia Evaluation   Pt has had prior anesthetic. Type: General and MAC.    No history of anesthetic complications       ROS/MED HX  ENT/Pulmonary: Comment: COVID 1/2022    (+) tobacco use, Past use, COPD (mild-moderate Emphysema ),     Neurologic:  - neg neurologic ROS     Cardiovascular: Comment: Ischemic cardiomyopathy with EF of 10-15%. Admitted for decompensated heart failure with multiorgan shock on 6/17/22. IABP 6/23/22. Plan for LVAD.    Severe ostial LAD disease with in-stent restenosis of mid LAD at diag bifurcation, severe proximal RCA disease, mild circ disease.    Dilated LV (LVIDd 6.1 cm)    Device  Check 6/23/22    Device: Medtronic [id number] Evera XT   Normal device function  Mode: AAI<>DDD /115 bpm  AP: 0.1%  : <0.1%  Intrinsic rhythm: AS-VS 98 bpm  Thoracic Impedance: Well below the reference line suggesting moderate intrathoracic fluid accumulation.  Short V-V intervals: 0  Lead Trends Appear Stable.  Estimated battery longevity to RRT = 3.8-6.3 years.  Battery voltage = 2.96 V.   Atrial Arrhythmia: None  AF Ophiem: 0%  Anticoagulant: None  Ventricular Arrhythmia: None    (+) Dyslipidemia hypertension-Peripheral Vascular Disease-CAD -past MI (2007) -stent (2005)-CHF ( ACC/AHA Stage D; SCAI Stage C) etiology: ICM Last EF: 10-15% NYHA classification: IV. CASEY. orthopnea/PND, ICD ( ) Reason placed:Primary prevention .  type;Medtronic CRT-D Settings AAI<-> DDD /115 bpm dysrhythmias (SVT/ VT, paroxysmal atrial fibrillation now in NSR), a-fib, valvular problems/murmurs type: MR severe. pulmonary hypertension, Previous cardiac testing   Echo: Date: 5/17/22 Results:  Severe left ventricular dilation is present. Left ventricular function is decreased. The ejection fraction is 10-15% (severely reduced). Severe diffuse hypokinesis is present. Mild right ventricular dilation is present. Global right ventricular function is mildly reduced. Severe functional mitral insufficiency is present. Pulmonary hypertension is present. Estimated pulmonary artery systolic pressure is 44 mmHg plus right atrial pressure. Dilation of the inferior vena cava is present with abnormal respiratory variation in diameter. Mean RA pressure estiamted at 15 mmHg. No pericardial effusion is present.    Stress Test: Date: Results:    ECG Reviewed: Date: 5/18/22 Results:  Sinus rhythm with occasional PACs   Non-specific intra-ventricular conduction block   Cath: Date: Results:  RHC from 7/1/22:    Right sided filling pressures are normal.    Moderately elevated pulmonary artery hypertension.    Left sided filling pressures are  moderately elevated.    Reduced cardiac output level.    RA 6  RV 52/7  PA 53/26 (35)  PCWP 22    Marley 3.63, index 2.06, TD CO 3.87, index 2.19  PVR by TD CO 3.3 Right sided filling pressures are normal. Left sided filling pressures are moderately elevated. Moderately elevated pulmonary artery hypertension. Reduced cardiac output level.      1.  Moderate pulmonary hypertension  2.  Reduced cardiac output index 2.2 by TD CO and 2.06 by Marley while on IABP support  3.  Moderately elevated left-sided filling pressures, normal right-sided filling pressures    LHC from 5/27/22:    Two vessel coronary artery disease including prior PCI of the RCA and LAD. There are no new hemodynamically significant lesions.         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        Other:            Physical Exam    Airway        Mallampati: II   TM distance: > 3 FB   Neck ROM: full   Mouth opening: > 3 cm    Respiratory Devices and Support         Dental  no notable dental history     (+) caps      Cardiovascular          Rhythm and rate: normal   (+) murmur       Pulmonary   pulmonary exam normal        breath sounds clear to auscultation           OUTSIDE LABS:  CBC:   Lab Results   Component Value Date    WBC 6.2 07/07/2022    WBC 6.6 07/06/2022    HGB 9.6 (L) 07/07/2022    HGB 9.2 (L)  07/06/2022    HCT 31.0 (L) 07/07/2022    HCT 30.5 (L) 07/06/2022     07/07/2022     07/06/2022     BMP:   Lab Results   Component Value Date     (L) 07/07/2022     (L) 07/06/2022    POTASSIUM 4.4 07/07/2022    POTASSIUM 4.5 07/06/2022    CHLORIDE 98 07/07/2022    CHLORIDE 101 07/06/2022    CO2 23 07/07/2022    CO2 22 07/06/2022    BUN 13.2 07/07/2022    BUN 14.7 07/06/2022    CR 0.68 07/07/2022    CR 0.74 07/06/2022    GLC 95 07/07/2022    GLC 87 07/06/2022     COAGS:   Lab Results   Component Value Date    PTT 53 (H) 05/16/2022    INR 1.13 07/07/2022     POC: No results found for: BGM, HCG, HCGS  HEPATIC:   Lab Results   Component Value Date    ALBUMIN 3.5 07/07/2022    PROTTOTAL 7.0 07/07/2022    ALT 41 07/07/2022    AST 32 07/07/2022    ALKPHOS 125 07/07/2022    BILITOTAL 0.3 07/07/2022     OTHER:   Lab Results   Component Value Date    LACT 0.8 07/02/2022    A1C 5.5 05/20/2022    ELDA 9.3 07/07/2022    PHOS 4.6 (H) 06/30/2022    MAG 1.9 07/07/2022    TSH 1.65 05/20/2022    CRP 28.0 (H) 06/19/2022    SED 39 (H) 06/19/2022       Anesthesia Plan    ASA Status:  4   NPO Status:  NPO Appropriate    Anesthesia Type: General.     - Airway: ETT   Induction: Intravenous.   Maintenance: Balanced.   Techniques and Equipment:     - Airway: Double lumen ETT     - Lines/Monitors: Arterial Line, Central Line, 2nd IV, PAC, CVP, TAVARES            TAVARES Absolute Contra-indication: NONE            TAVARES Relative Contra-indication: NONE     - Blood: Cell Saver, T&S     - Drips/Meds: Epinephrine, Vasopressin, Norepi, Nitric Oxide     Consents    Anesthesia Plan(s) and associated risks, benefits, and realistic alternatives discussed. Questions answered and patient/representative(s) expressed understanding.    - Discussed:     - Discussed with:  Patient      - Extended Intubation/Ventilatory Support Discussed: Yes.      - Patient is DNR/DNI Status: No    Use of blood products discussed: Yes.     - Discussed with:  Patient.     - Consented: consented to blood products            Reason for refusal: other.     Postoperative Care    Pain management: Multi-modal analgesia.   PONV prophylaxis: Ondansetron (or other 5HT-3), Dexamethasone or Solumedrol     Comments:    Other Comments: 61 year old male with a history of CAD, ischemic cardiomyopathy (EF 15%), antiphospholipid syndrome (on warfarin), who was initially transferred (6/17) for cardiogenic shock. He was admitted to the cardiac ICU , where he had a subclavian IABP placed. He course was complicated by bloody emesis and epistaxis requiring cauterization. He is being evaluated for cardiac transplant, however he had a recent panel reactive antibody of 97%, concerning for high liklihood of transplant rejection. He presents to the OR today for HeartMate III.     Plan for GETA w/ arterial line, TAVARES, CVC and PAC.            Joseph Fulton

## 2022-07-07 NOTE — PLAN OF CARE
Alert and oriented x 4. Slept very well. No c/o of back pain after prn oxycodone x 1. On RA. Sinus rhythm with 1st degree AV block and left BBB. No ectopy. Maps are in mid 60s-mid 70s most of the night. Afebrile. No IABP alarms. Good urine output. BS are present. Replaced magnesium this am.

## 2022-07-07 NOTE — PROGRESS NOTES
[facility]    Cardiology Progress Note        Date of Admission:  6/17/2022     Assessment & Plan: HVSL   61-year-old man with history of ischemic cardiomyopathy LVEF 15%) NYHA class IV symptoms ACC stage D, multiple admissions with heart failure over the past several months. Presented with cardiogenic shock c/b multi-organ failure (JOSE; Acute liver injury/shock liver) requiring mechanical hemodynamic support. Now listed as status 2 for OHT. Course c/b large epistaxis 6/22 with 400 ml blood. Had subclavian IABP placed instead of femoral IABP 6/23/22, adjusted 7/3/22 due to coiling in aortic arch that was asymptomatic and without pulse changes in extremities. Hemoglobin gradually downtrending though recently stable without e/o further e/o bleeding. Continue to optimize his hemodynamics while awaiting LVAD/transplant.     Updates:  - continue IABP at 1:1  - Tentatively plan for LVAD this week pending OR schedule     Plan: Critical Care      ===============  CARDIOVASCULAR  ===============  # Ambulatory cardiogenic shock SCAI D  # Advanced ischemic cardiomyopathy, Class IV, Stage D  # s/p CRT-D (Medtronic 2006, gen change 2012)  # SVT  # Nonsustained VT  # CAD s/p PCI to LAD (2005)  # Hx of MI (2007)  # Severe ostial LAD disease with in-stent restenosis of mid LAD at diag bifurcation, severe proximal RCA disease, mild circ disease now s/p ostial LAD and proximal RCA lithotripsy and stenting on 5/24/22  CMRI showing infarcted myocardium in LAD territory. Given the degree of LV dysfunction/dilation, moderate to severe functional MR and lack of viable myocardium, cardiac surgery would be high risk. Now s/p ostial LAD and proximal RCA lithotripsy and stenting on 5/24/22. RHC 7/1 to reassess hemodynamics, results as below. Being evaluated for transplant/LVAD, PRA returned with reaction with 97% of antibodies in panel, high risk of acute rejection. Ongoing discussion with  immunology regarding any potential reasons for particularly high PRA, suggested oncology consult for evaluation of possible malignancy causing polyclonal activation. CT Chest/Abdomen/Pelvis without any evidence of new malignancy. Oncology consulted.      RHC 7/1:  RA 6  RV 52/7  PA 53/26 (35)  PCWP 22  [place]ley CO 3.63, CI 2.06,   TD CO 3.87, CI 2.19  PVR by TD CO 3.3     - Mechanical support: IABP 1:1, continuing (placed 6/23/22)  - Anticoagulation: heparin gtt DVT/PE in setting of APL  - Volume status: euvolemic and making adequate urine; no diuretics today   - BB: contraindicated with MCS  - ACEi/ARB/ARNI: none  - MRA: none  - SGLT2i: none  - SCD prophylaxis: Medtronic CRT-D      LVAD/transplant workup started, discussion with patient:  - Echocardiogram done  - Cardiopulmonary stress testing ordered  - Infectious labs: Quant Gold (-), VZV IgG (+), Treponema Abs w/reflex RPR/titer (-), Toxoplasma IgG (-), Hepatitis serology (all negative, HepB nonimmune), HSV1/2 IgG (-), HIV combo (-), EBV/CMV IgG (both positive)  - Other labs: blood type (O positive), iron studies, prealbumin 22, UA done, TSH 1.65, PRA single antigen IgG antibody (97%)  - Substance abuse screen: PEth (-), UDS (-)  - RHC done, current hemodynamic monitoring  - Imaging: CT Chest A/P with Chronic interstitial subpleural fibrotic disease with paraseptal emphysematous change, US abdomen complete w/doppler , US JOE Doppler, US LE arterial duplex b/l all unremarkable  - PFTs, 6 Minute Walk Test not done  - Consults: Dental, CVTS, Palliative Care, Nutrition, Neuropsych, Social Work Dental consult  - CVTS consulted  - elevated PRA to 97; discussing with immunology and oncology possible etiologies for this; overall unclear why such significant elevation vs prior     ===========   PULMONARY  ===========  # Mild-Moderate Emphysema  # Hx of COVID-19  Former smoker 2 ppd for 40 years quit on 09/09/09.  - Appreciate pulmonary consultation given emphysema  - ILD  panel:                           - autoimmune work- up:                                      - LASHANDA positive                                      - Anti Helen-1 IgG negative                                      - SSA RO, SSB LA Ab negative                                      - Scleroderma ab scl 70 negative                                      - RF negative                                      - anti CCP 1.7                                      - ANCA IgG < 1:10                                      - Aldolase: 19.6(elevated)              - no need for PFTs with DLCO                                     - if symptoms are considered related to pulm findings: inhaler therapy with LABA/LAMA combination     =====  Renal  =====  -Strict I&O while diuresing   -Monitor Electrolytes while actively diuresing K->4 Mg->2  #Hypokalemia  #Hypomagnesemia  #Hyponatremia  In setting of HF. On replacement protocols     ===================   GASTROENTEROLOGY  ===================  # Severe malnutrition in the context of chronic illness  # Liver injury, in the setting of cardiogenic shock  # GERD  - monitor  - Diuresis as above  - Supplements to help maintain nutrition.  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  - continue low intensity heparin gtt     # Anemia, acute on chronic  # Iron deficiency  Baseline hgb 8-9. Started to downtrend since nose bleed 6/22. Gradually downtrending without signs of new or ongoing bleeding. Found to have iron deficiency.   - S/p iron IV *3 doses [id number]  - Restart ferrous sulfate 325 mg daily  - continue pediatric tubes for labs, limited blood  draw  - monitor for signs of bleeding     # Profuse epistaxis from the left nare with bloody emesis  ~ 400 ml of bloody emesis in the blue bag on arrival. Also had one severe epistaxis that occurred at [place] requiring cauterization.  - Left nasal cavity packed with all absorbable packing - surgifoam + surgicel + surgiflo. No antibiotics are indicated due to absorbable packing.  - saline nasal pray q3h  - if begins to  in quantity of bleeding would recommend spraying the nasal cavity copiously with afrin and holding pressure over the soft part of the nose for 20 minutes continuously. Nasal clips are ineffective and should not be used in place of digital pressure. If bleeding continues despite these measures, repeat. If bleeding continues or is severe please contact ENT.     ==   ID  ==  ROHIT     ===============  RHEUMATOLOGY  ===============  #Lupus  #APL  - Continue pta hydroxychloroquine 200mg bid   - PTA mycophenolic acid 1500mg q12h on hold in anticipation for LVAD  -- Holding MMF one week prior to surgery and re-starting 5-7 days after LVAD placement in the absence of surgical site infection, poor wound healing or infection elsewhere.     ===    ===  - Continue pta tamsulosin 0.4mg daily       Lines/ tubes/ drains:  Fitzgerald Catheter: Not present  Central Lines: PRESENT  PICC Triple Lumen 06/17/22 Right Brachial vein medial-Site Assessment: WDL     Prophylaxis:  - DVT Prophylaxis: heparin gtt (on for DVT/PE in setting of APL)  - PUD Prophylaxis: pantoprazole     Code Status: Full Code       Disposition:  - ICU    The patient's care was discussed with the Attending Physician, Dr. Stewart, Bedside Nurse and Patient.    Emelyn Meneses MD  [facility]    ______________________________________________________________________    Interval History   Nursing notes reviewed. BRAXTON. Feeling well this AM. No concerns or questions. No chest pain, SOB, abdominal pain,  flank pain, hematuria.     Data reviewed today: I reviewed all medications, new labs and imaging results over the last 24 hours. I personally reviewed the chest x-ray image(s) showing stable IABP     Physical Exam   Vital Signs: Temp: 97.3  F (36.3  C) Temp src: Axillary BP: 97/72 Pulse: 86   Resp: 19 SpO2: 96 % O2 Device: None (Room air)    Weight: 138 lbs 14.24 oz  General Appearance: Male in NAD resting in bed, polite and conversational  Respiratory: ctab on RA, nonlabored  Cardiovascular: rrr, s1, s2, no murmur  GI: soft, nt/nd  Skin: warm, well perfused, IABP site c/d/i without swelling/hematoma  Neuro: alert, interactive, speech fluent, moving all 4     Data   Recent Labs   Lab 07/07/22  0356 07/06/22  0346 07/05/22  0404   WBC 6.2 6.6 5.9   HGB 9.6* 9.2* 8.8*   MCV 92 92 91    429 362   INR 1.13 1.12 1.28*   * 134* 130*   POTASSIUM 4.4 4.5 4.5   CHLORIDE 98 101 97*   CO2 23 22 21*   BUN 13.2 14.7 14.7   CR 0.68 0.74 0.71   ANIONGAP 14 11 12   ELDA 9.3 9.5 9.1   GLC 95 87 204*   ALBUMIN 3.5 3.4* 3.2*   PROTTOTAL 7.0 7.0 6.5   BILITOTAL 0.3 0.3 0.3   ALKPHOS 125 125 120   ALT 41 39 41   AST 32 30 24     Recent Results (from the past 24 hour(s))   XR Chest Port 1 View    Narrative    EXAM: XR CHEST PORT 1 VIEW  7/7/2022 1:05 AM     HISTORY:  iabp positioning       COMPARISON:  7/6/2022    FINDINGS  Technique: Semiupright AP view of the chest.     Devices: Right subclavian approach Intra-aortic balloon pump superior  marker projects over the high descending thoracic aorta, unchanged.  Left chest cardiac conduction device with leads projecting over the  right atrium, right ventricle, and a left ventricular vein. Right PICC  terminates over the mid SVC. Left-sided coronary stent.    No new focal airspace opacity.  No discernible pneumothorax.  No  pleural effusion.     Cardiomediastinal silhouette is  stable in size.      Impression    IMPRESSION:     1. Unchanged position of intra-aortic balloon  pump.  2. No focal new airspace opacity.     Medications     heparin 1,300 Units/hr (07/07/22 0700)       aspirin  81 mg Oral Daily     DULoxetine  30 mg Oral Daily     ferrous sulfate  325 mg Oral Daily     fluticasone-vilanterol  1 puff Inhalation Daily     hydroxychloroquine  200 mg Oral BID     melatonin  10 mg Oral At Bedtime     polyethylene glycol  17 g Oral Daily     senna-docusate  2 tablet Oral BID     sodium chloride  2 spray Both Nostrils Q3H     tamsulosin  0.4 mg Oral Daily     thiamine  100 mg Oral Daily     traZODone  100 mg Oral At Bedtime

## 2022-07-08 ENCOUNTER — APPOINTMENT (OUTPATIENT)
Dept: GENERAL RADIOLOGY | Facility: CLINIC | Age: 62
DRG: 001 | End: 2022-07-08
Attending: THORACIC SURGERY (CARDIOTHORACIC VASCULAR SURGERY)
Payer: COMMERCIAL

## 2022-07-08 ENCOUNTER — APPOINTMENT (OUTPATIENT)
Dept: GENERAL RADIOLOGY | Facility: CLINIC | Age: 62
DRG: 001 | End: 2022-07-08
Attending: STUDENT IN AN ORGANIZED HEALTH CARE EDUCATION/TRAINING PROGRAM
Payer: COMMERCIAL

## 2022-07-08 ENCOUNTER — ANCILLARY PROCEDURE (OUTPATIENT)
Dept: CARDIOLOGY | Facility: CLINIC | Age: 62
DRG: 001 | End: 2022-07-08
Attending: PHYSICIAN ASSISTANT
Payer: COMMERCIAL

## 2022-07-08 ENCOUNTER — TELEPHONE (OUTPATIENT)
Dept: TRANSPLANT | Facility: CLINIC | Age: 62
End: 2022-07-08

## 2022-07-08 ENCOUNTER — PREP FOR PROCEDURE (OUTPATIENT)
Dept: CARDIOLOGY | Facility: CLINIC | Age: 62
End: 2022-07-08

## 2022-07-08 ENCOUNTER — ANESTHESIA (OUTPATIENT)
Dept: SURGERY | Facility: CLINIC | Age: 62
DRG: 001 | End: 2022-07-08
Payer: COMMERCIAL

## 2022-07-08 ENCOUNTER — ANCILLARY PROCEDURE (OUTPATIENT)
Dept: CARDIOLOGY | Facility: CLINIC | Age: 62
DRG: 001 | End: 2022-07-08
Attending: INTERNAL MEDICINE
Payer: COMMERCIAL

## 2022-07-08 VITALS — BODY MASS INDEX: 21.61 KG/M2 | WEIGHT: 138 LBS

## 2022-07-08 DIAGNOSIS — I25.5 ISCHEMIC CARDIOMYOPATHY: ICD-10-CM

## 2022-07-08 DIAGNOSIS — Z98.890 STATUS POST CARDIAC SURGERY: Primary | ICD-10-CM

## 2022-07-08 DIAGNOSIS — I25.5 ISCHEMIC CARDIOMYOPATHY: Primary | ICD-10-CM

## 2022-07-08 LAB
ACT BLD: 173 SECONDS (ref 74–150)
ACT BLD: 177 SECONDS (ref 74–150)
ALBUMIN SERPL BCG-MCNC: 2.3 G/DL (ref 3.5–5.2)
ALBUMIN SERPL BCG-MCNC: 2.5 G/DL (ref 3.5–5.2)
ALBUMIN SERPL BCG-MCNC: 3.5 G/DL (ref 3.5–5.2)
ALP SERPL-CCNC: 127 U/L (ref 40–129)
ALP SERPL-CCNC: 51 U/L (ref 40–129)
ALP SERPL-CCNC: 59 U/L (ref 40–129)
ALT SERPL W P-5'-P-CCNC: 25 U/L (ref 10–50)
ALT SERPL W P-5'-P-CCNC: 29 U/L (ref 10–50)
ALT SERPL W P-5'-P-CCNC: 39 U/L (ref 10–50)
ANION GAP SERPL CALCULATED.3IONS-SCNC: 11 MMOL/L (ref 7–15)
ANION GAP SERPL CALCULATED.3IONS-SCNC: 14 MMOL/L (ref 7–15)
ANION GAP SERPL CALCULATED.3IONS-SCNC: 9 MMOL/L (ref 7–15)
APTT PPP: 105 SECONDS (ref 22–38)
APTT PPP: 45 SECONDS (ref 22–38)
APTT PPP: 52 SECONDS (ref 22–38)
APTT PPP: 89 SECONDS (ref 22–38)
AST SERPL W P-5'-P-CCNC: 134 U/L (ref 10–50)
AST SERPL W P-5'-P-CCNC: 159 U/L (ref 10–50)
AST SERPL W P-5'-P-CCNC: 32 U/L (ref 10–50)
BASE EXCESS BLDA CALC-SCNC: -1 MMOL/L (ref -9–1.8)
BASE EXCESS BLDA CALC-SCNC: -1.3 MMOL/L (ref -9–1.8)
BASE EXCESS BLDA CALC-SCNC: -1.5 MMOL/L (ref -9–1.8)
BASE EXCESS BLDA CALC-SCNC: -1.6 MMOL/L (ref -9–1.8)
BASE EXCESS BLDA CALC-SCNC: -3.1 MMOL/L (ref -9.6–2)
BASE EXCESS BLDA CALC-SCNC: -3.4 MMOL/L (ref -9.6–2)
BASE EXCESS BLDA CALC-SCNC: -3.8 MMOL/L (ref -9.6–2)
BASE EXCESS BLDA CALC-SCNC: -3.8 MMOL/L (ref -9.6–2)
BASE EXCESS BLDA CALC-SCNC: -4 MMOL/L (ref -9.6–2)
BASE EXCESS BLDA CALC-SCNC: -4.2 MMOL/L (ref -9.6–2)
BASE EXCESS BLDA CALC-SCNC: -5.9 MMOL/L (ref -9.6–2)
BASE EXCESS BLDV CALC-SCNC: -0.3 MMOL/L (ref -7.7–1.9)
BASE EXCESS BLDV CALC-SCNC: -0.8 MMOL/L (ref -7.7–1.9)
BASE EXCESS BLDV CALC-SCNC: -2.8 MMOL/L (ref -8.1–1.9)
BASE EXCESS BLDV CALC-SCNC: 1.2 MMOL/L (ref -7.7–1.9)
BILIRUB SERPL-MCNC: 0.3 MG/DL
BILIRUB SERPL-MCNC: 1 MG/DL
BILIRUB SERPL-MCNC: 1 MG/DL
BLD PROD TYP BPU: NORMAL
BLOOD COMPONENT TYPE: NORMAL
BUN SERPL-MCNC: 14 MG/DL (ref 8–23)
BUN SERPL-MCNC: 15.3 MG/DL (ref 8–23)
BUN SERPL-MCNC: 16.7 MG/DL (ref 8–23)
CA-I BLD-MCNC: 4 MG/DL (ref 4.4–5.2)
CA-I BLD-MCNC: 4 MG/DL (ref 4.4–5.2)
CA-I BLD-MCNC: 4.2 MG/DL (ref 4.4–5.2)
CA-I BLD-MCNC: 4.2 MG/DL (ref 4.4–5.2)
CA-I BLD-MCNC: 4.6 MG/DL (ref 4.4–5.2)
CA-I BLD-MCNC: 4.7 MG/DL (ref 4.4–5.2)
CA-I BLD-MCNC: 4.9 MG/DL (ref 4.4–5.2)
CA-I BLD-MCNC: 4.9 MG/DL (ref 4.4–5.2)
CA-I BLD-MCNC: 5.1 MG/DL (ref 4.4–5.2)
CA-I BLD-MCNC: 5.2 MG/DL (ref 4.4–5.2)
CALCIUM SERPL-MCNC: 8.2 MG/DL (ref 8.8–10.2)
CALCIUM SERPL-MCNC: 8.3 MG/DL (ref 8.8–10.2)
CALCIUM SERPL-MCNC: 9.8 MG/DL (ref 8.8–10.2)
CELL TYPE ALLO: ABNORMAL
CF REDUC 30M P MA P HEP LENFR BLD TEG: 0.2 % (ref 0–8)
CF REDUC 60M P MA LENFR BLD TEG: 1.6 % (ref 0–15)
CF REDUC 60M P MA LENFR BLD TEG: 2.6 % (ref 0–15)
CF REDUC 60M P MA P HEPASE LENFR BLD TEG: 2.1 % (ref 0–15)
CFT BLD TEG: 0.8 MINUTE (ref 1–3)
CFT BLD TEG: 2.8 MINUTE (ref 1–3)
CFT P HPASE BLD TEG: 0.8 MINUTE (ref 1–3)
CHANNELSHIFTALLOB1: 173
CHANNELSHIFTALLOB2: 50
CHANNELSHIFTALLOB3: -25
CHANNELSHIFTALLOT1: 142
CHANNELSHIFTALLOT2: 16
CHANNELSHIFTALLOT3: -6
CHLORIDE SERPL-SCNC: 104 MMOL/L (ref 98–107)
CHLORIDE SERPL-SCNC: 106 MMOL/L (ref 98–107)
CHLORIDE SERPL-SCNC: 107 MMOL/L (ref 98–107)
CI (COAGULATION INDEX)(Z) NON NATIVE: -4.5 (ref -3–3)
CI (COAGULATION INDEX)(Z) NON NATIVE: 3.6 (ref -3–3)
CI (COAGULATION INDEX)(Z): 2.3 (ref -3–3)
CLOT ANGLE BLD TEG: 53 DEGREES (ref 53–72)
CLOT ANGLE BLD TEG: 78.3 DEGREES (ref 53–72)
CLOT ANGLE P HPASE BLD TEG: 64.2 DEGREES (ref 53–72)
CLOT INIT BLD TEG: 12.8 MINUTE (ref 5–10)
CLOT INIT BLD TEG: 4.2 MINUTE (ref 5–10)
CLOT INIT P HPASE BLD TEG: 4.7 MINUTE (ref 5–10)
CLOT LYSIS 30M P MA LENFR BLD TEG: 0.1 % (ref 0–8)
CLOT LYSIS 30M P MA LENFR BLD TEG: 0.5 % (ref 0–8)
CLOT STRENGTH BLD TEG: 11.7 KD/SC (ref 4.5–11)
CLOT STRENGTH BLD TEG: 12 KD/SC (ref 4.5–11)
CLOT STRENGTH P HPASE BLD TEG: 11.8 KD/SC (ref 4.5–11)
CODING SYSTEM: NORMAL
CREAT SERPL-MCNC: 0.66 MG/DL (ref 0.67–1.17)
CREAT SERPL-MCNC: 0.73 MG/DL (ref 0.67–1.17)
CREAT SERPL-MCNC: 0.78 MG/DL (ref 0.67–1.17)
CROSSMATCH: NORMAL
CROSSMATCHDATEALLO: ABNORMAL
DEPRECATED HCO3 PLAS-SCNC: 20 MMOL/L (ref 22–29)
DEPRECATED HCO3 PLAS-SCNC: 22 MMOL/L (ref 22–29)
DEPRECATED HCO3 PLAS-SCNC: 23 MMOL/L (ref 22–29)
DONOR ALLO: ABNORMAL
DONORCELLDATE ALLO: ABNORMAL
ERYTHROCYTE [DISTWIDTH] IN BLOOD BY AUTOMATED COUNT: 14.6 % (ref 10–15)
ERYTHROCYTE [DISTWIDTH] IN BLOOD BY AUTOMATED COUNT: 14.9 % (ref 10–15)
ERYTHROCYTE [DISTWIDTH] IN BLOOD BY AUTOMATED COUNT: 15.1 % (ref 10–15)
ERYTHROCYTE [DISTWIDTH] IN BLOOD BY AUTOMATED COUNT: 15.1 % (ref 10–15)
ERYTHROCYTE [DISTWIDTH] IN BLOOD BY AUTOMATED COUNT: 15.4 % (ref 10–15)
FIBRINOGEN PPP-MCNC: 260 MG/DL (ref 170–490)
FIBRINOGEN PPP-MCNC: 266 MG/DL (ref 170–490)
FIBRINOGEN PPP-MCNC: 296 MG/DL (ref 170–490)
GFR SERPL CREATININE-BSD FRML MDRD: >90 ML/MIN/1.73M2
GLUCOSE BLD-MCNC: 163 MG/DL (ref 70–99)
GLUCOSE BLD-MCNC: 166 MG/DL (ref 70–99)
GLUCOSE BLD-MCNC: 195 MG/DL (ref 70–99)
GLUCOSE BLD-MCNC: 224 MG/DL (ref 70–99)
GLUCOSE BLD-MCNC: 281 MG/DL (ref 70–99)
GLUCOSE BLD-MCNC: 281 MG/DL (ref 70–99)
GLUCOSE BLD-MCNC: 306 MG/DL (ref 70–99)
GLUCOSE BLD-MCNC: 310 MG/DL (ref 70–99)
GLUCOSE BLDC GLUCOMTR-MCNC: 101 MG/DL (ref 70–99)
GLUCOSE BLDC GLUCOMTR-MCNC: 113 MG/DL (ref 70–99)
GLUCOSE BLDC GLUCOMTR-MCNC: 188 MG/DL (ref 70–99)
GLUCOSE BLDC GLUCOMTR-MCNC: 218 MG/DL (ref 70–99)
GLUCOSE BLDC GLUCOMTR-MCNC: 92 MG/DL (ref 70–99)
GLUCOSE SERPL-MCNC: 101 MG/DL (ref 70–99)
GLUCOSE SERPL-MCNC: 108 MG/DL (ref 70–99)
GLUCOSE SERPL-MCNC: 96 MG/DL (ref 70–99)
HCO3 BLD-SCNC: 21 MMOL/L (ref 21–28)
HCO3 BLD-SCNC: 21 MMOL/L (ref 21–28)
HCO3 BLD-SCNC: 23 MMOL/L (ref 21–28)
HCO3 BLD-SCNC: 23 MMOL/L (ref 21–28)
HCO3 BLD-SCNC: 24 MMOL/L (ref 21–28)
HCO3 BLDA-SCNC: 19 MMOL/L (ref 21–28)
HCO3 BLDA-SCNC: 19 MMOL/L (ref 21–28)
HCO3 BLDA-SCNC: 21 MMOL/L (ref 21–28)
HCO3 BLDA-SCNC: 22 MMOL/L (ref 21–28)
HCO3 BLDA-SCNC: 23 MMOL/L (ref 21–28)
HCO3 BLDA-SCNC: 23 MMOL/L (ref 21–28)
HCO3 BLDV-SCNC: 24 MMOL/L (ref 21–28)
HCO3 BLDV-SCNC: 24 MMOL/L (ref 21–28)
HCO3 BLDV-SCNC: 26 MMOL/L (ref 21–28)
HCO3 BLDV-SCNC: 26 MMOL/L (ref 21–28)
HCT VFR BLD AUTO: 19.9 % (ref 40–53)
HCT VFR BLD AUTO: 21.9 % (ref 40–53)
HCT VFR BLD AUTO: 25.8 % (ref 40–53)
HCT VFR BLD AUTO: 25.9 % (ref 40–53)
HCT VFR BLD AUTO: 29.4 % (ref 40–53)
HGB BLD-MCNC: 10.4 G/DL (ref 13.3–17.7)
HGB BLD-MCNC: 6.4 G/DL (ref 13.3–17.7)
HGB BLD-MCNC: 6.5 G/DL (ref 13.3–17.7)
HGB BLD-MCNC: 6.8 G/DL (ref 13.3–17.7)
HGB BLD-MCNC: 6.9 G/DL (ref 13.3–17.7)
HGB BLD-MCNC: 7.1 G/DL (ref 13.3–17.7)
HGB BLD-MCNC: 7.2 G/DL (ref 13.3–17.7)
HGB BLD-MCNC: 7.2 G/DL (ref 13.3–17.7)
HGB BLD-MCNC: 7.7 G/DL (ref 13.3–17.7)
HGB BLD-MCNC: 8 G/DL (ref 13.3–17.7)
HGB BLD-MCNC: 8.7 G/DL (ref 13.3–17.7)
HGB BLD-MCNC: 8.7 G/DL (ref 13.3–17.7)
HGB BLD-MCNC: 9 G/DL (ref 13.3–17.7)
INR PPP: 1.21 (ref 0.85–1.15)
INR PPP: 1.45 (ref 0.85–1.15)
INR PPP: 1.68 (ref 0.85–1.15)
INR PPP: 1.74 (ref 0.85–1.15)
INR PPP: 1.87 (ref 0.85–1.15)
ISSUE DATE AND TIME: NORMAL
LACTATE BLD-SCNC: 1.8 MMOL/L
LACTATE BLD-SCNC: 2.7 MMOL/L
LACTATE BLD-SCNC: 3.1 MMOL/L
LACTATE BLD-SCNC: 4.2 MMOL/L
LACTATE BLD-SCNC: 4.4 MMOL/L
LACTATE BLD-SCNC: 4.9 MMOL/L
LACTATE BLD-SCNC: 5.7 MMOL/L
LACTATE BLD-SCNC: 6 MMOL/L
LACTATE SERPL-SCNC: 2 MMOL/L (ref 0.7–2)
LACTATE SERPL-SCNC: 2.7 MMOL/L (ref 0.7–2)
LACTATE SERPL-SCNC: 4.9 MMOL/L (ref 0.7–2)
Lab: ABNORMAL
MAGNESIUM SERPL-MCNC: 2.2 MG/DL (ref 1.7–2.3)
MAGNESIUM SERPL-MCNC: 2.3 MG/DL (ref 1.7–2.3)
MAGNESIUM SERPL-MCNC: 2.5 MG/DL (ref 1.7–2.3)
MCF BLD TEG: 70.1 MM (ref 50–70)
MCF BLD TEG: 70.5 MM (ref 50–70)
MCF P HPASE BLD TEG: 70.3 MM (ref 50–70)
MCH RBC QN AUTO: 28 PG (ref 26.5–33)
MCH RBC QN AUTO: 28.8 PG (ref 26.5–33)
MCH RBC QN AUTO: 29.1 PG (ref 26.5–33)
MCH RBC QN AUTO: 29.8 PG (ref 26.5–33)
MCH RBC QN AUTO: 29.9 PG (ref 26.5–33)
MCHC RBC AUTO-ENTMCNC: 30.6 G/DL (ref 31.5–36.5)
MCHC RBC AUTO-ENTMCNC: 32.2 G/DL (ref 31.5–36.5)
MCHC RBC AUTO-ENTMCNC: 32.9 G/DL (ref 31.5–36.5)
MCHC RBC AUTO-ENTMCNC: 33.6 G/DL (ref 31.5–36.5)
MCHC RBC AUTO-ENTMCNC: 33.7 G/DL (ref 31.5–36.5)
MCV RBC AUTO: 89 FL (ref 78–100)
MCV RBC AUTO: 90 FL (ref 78–100)
MCV RBC AUTO: 92 FL (ref 78–100)
MDC_IDC_LEAD_IMPLANT_DT: NORMAL
MDC_IDC_LEAD_IMPLANT_DT: NORMAL
MDC_IDC_LEAD_LOCATION: NORMAL
MDC_IDC_LEAD_LOCATION: NORMAL
MDC_IDC_LEAD_LOCATION_DETAIL_1: NORMAL
MDC_IDC_LEAD_LOCATION_DETAIL_1: NORMAL
MDC_IDC_LEAD_MFG: NORMAL
MDC_IDC_LEAD_MFG: NORMAL
MDC_IDC_LEAD_MODEL: NORMAL
MDC_IDC_LEAD_MODEL: NORMAL
MDC_IDC_LEAD_POLARITY_TYPE: NORMAL
MDC_IDC_LEAD_POLARITY_TYPE: NORMAL
MDC_IDC_LEAD_SERIAL: NORMAL
MDC_IDC_LEAD_SERIAL: NORMAL
MDC_IDC_MSMT_BATTERY_DTM: NORMAL
MDC_IDC_MSMT_BATTERY_REMAINING_LONGEVITY: 63 MO
MDC_IDC_MSMT_BATTERY_RRT_TRIGGER: 2.73
MDC_IDC_MSMT_BATTERY_STATUS: NORMAL
MDC_IDC_MSMT_BATTERY_VOLTAGE: 2.98 V
MDC_IDC_MSMT_CAP_CHARGE_DTM: NORMAL
MDC_IDC_MSMT_CAP_CHARGE_ENERGY: 18 J
MDC_IDC_MSMT_CAP_CHARGE_TIME: 3.97
MDC_IDC_MSMT_CAP_CHARGE_TYPE: NORMAL
MDC_IDC_MSMT_LEADCHNL_RA_IMPEDANCE_VALUE: 342 OHM
MDC_IDC_MSMT_LEADCHNL_RA_PACING_THRESHOLD_AMPLITUDE: 0.5 V
MDC_IDC_MSMT_LEADCHNL_RA_PACING_THRESHOLD_PULSEWIDTH: 0.4 MS
MDC_IDC_MSMT_LEADCHNL_RA_SENSING_INTR_AMPL: 1.3 MV
MDC_IDC_MSMT_LEADCHNL_RV_IMPEDANCE_VALUE: 304 OHM
MDC_IDC_MSMT_LEADCHNL_RV_IMPEDANCE_VALUE: 418 OHM
MDC_IDC_MSMT_LEADCHNL_RV_PACING_THRESHOLD_AMPLITUDE: 1 V
MDC_IDC_MSMT_LEADCHNL_RV_PACING_THRESHOLD_PULSEWIDTH: 0.4 MS
MDC_IDC_MSMT_LEADCHNL_RV_SENSING_INTR_AMPL: 13 MV
MDC_IDC_PG_IMPLANT_DTM: NORMAL
MDC_IDC_PG_MFG: NORMAL
MDC_IDC_PG_MODEL: NORMAL
MDC_IDC_PG_SERIAL: NORMAL
MDC_IDC_PG_TYPE: NORMAL
MDC_IDC_SESS_CLINIC_NAME: NORMAL
MDC_IDC_SESS_DTM: NORMAL
MDC_IDC_SESS_TYPE: NORMAL
MDC_IDC_SET_BRADY_AT_MODE_SWITCH_RATE: 171 {BEATS}/MIN
MDC_IDC_SET_BRADY_HYSTRATE: NORMAL
MDC_IDC_SET_BRADY_LOWRATE: 50 {BEATS}/MIN
MDC_IDC_SET_BRADY_MAX_SENSOR_RATE: 115 {BEATS}/MIN
MDC_IDC_SET_BRADY_MAX_TRACKING_RATE: 130 {BEATS}/MIN
MDC_IDC_SET_BRADY_MODE: NORMAL
MDC_IDC_SET_BRADY_PAV_DELAY_LOW: 350 MS
MDC_IDC_SET_BRADY_SAV_DELAY_LOW: 350 MS
MDC_IDC_SET_LEADCHNL_RA_PACING_AMPLITUDE: 1.5 V
MDC_IDC_SET_LEADCHNL_RA_PACING_ANODE_ELECTRODE_1: NORMAL
MDC_IDC_SET_LEADCHNL_RA_PACING_ANODE_LOCATION_1: NORMAL
MDC_IDC_SET_LEADCHNL_RA_PACING_CAPTURE_MODE: NORMAL
MDC_IDC_SET_LEADCHNL_RA_PACING_CATHODE_ELECTRODE_1: NORMAL
MDC_IDC_SET_LEADCHNL_RA_PACING_CATHODE_LOCATION_1: NORMAL
MDC_IDC_SET_LEADCHNL_RA_PACING_POLARITY: NORMAL
MDC_IDC_SET_LEADCHNL_RA_PACING_PULSEWIDTH: 0.4 MS
MDC_IDC_SET_LEADCHNL_RA_SENSING_ANODE_ELECTRODE_1: NORMAL
MDC_IDC_SET_LEADCHNL_RA_SENSING_ANODE_LOCATION_1: NORMAL
MDC_IDC_SET_LEADCHNL_RA_SENSING_CATHODE_ELECTRODE_1: NORMAL
MDC_IDC_SET_LEADCHNL_RA_SENSING_CATHODE_LOCATION_1: NORMAL
MDC_IDC_SET_LEADCHNL_RA_SENSING_POLARITY: NORMAL
MDC_IDC_SET_LEADCHNL_RA_SENSING_SENSITIVITY: 0.3 MV
MDC_IDC_SET_LEADCHNL_RV_PACING_AMPLITUDE: 2 V
MDC_IDC_SET_LEADCHNL_RV_PACING_ANODE_ELECTRODE_1: NORMAL
MDC_IDC_SET_LEADCHNL_RV_PACING_ANODE_LOCATION_1: NORMAL
MDC_IDC_SET_LEADCHNL_RV_PACING_CAPTURE_MODE: NORMAL
MDC_IDC_SET_LEADCHNL_RV_PACING_CATHODE_ELECTRODE_1: NORMAL
MDC_IDC_SET_LEADCHNL_RV_PACING_CATHODE_LOCATION_1: NORMAL
MDC_IDC_SET_LEADCHNL_RV_PACING_POLARITY: NORMAL
MDC_IDC_SET_LEADCHNL_RV_PACING_PULSEWIDTH: 0.4 MS
MDC_IDC_SET_LEADCHNL_RV_SENSING_ANODE_ELECTRODE_1: NORMAL
MDC_IDC_SET_LEADCHNL_RV_SENSING_ANODE_LOCATION_1: NORMAL
MDC_IDC_SET_LEADCHNL_RV_SENSING_CATHODE_ELECTRODE_1: NORMAL
MDC_IDC_SET_LEADCHNL_RV_SENSING_CATHODE_LOCATION_1: NORMAL
MDC_IDC_SET_LEADCHNL_RV_SENSING_POLARITY: NORMAL
MDC_IDC_SET_LEADCHNL_RV_SENSING_SENSITIVITY: 0.3 MV
MDC_IDC_SET_ZONE_DETECTION_BEATS_DENOMINATOR: 40 {BEATS}
MDC_IDC_SET_ZONE_DETECTION_BEATS_NUMERATOR: 30 {BEATS}
MDC_IDC_SET_ZONE_DETECTION_INTERVAL: 280 MS
MDC_IDC_SET_ZONE_DETECTION_INTERVAL: 320 MS
MDC_IDC_SET_ZONE_DETECTION_INTERVAL: 350 MS
MDC_IDC_SET_ZONE_DETECTION_INTERVAL: 350 MS
MDC_IDC_SET_ZONE_DETECTION_INTERVAL: 400 MS
MDC_IDC_SET_ZONE_TYPE: NORMAL
MDC_IDC_STAT_AT_BURDEN_PERCENT: 0 %
MDC_IDC_STAT_AT_DTM_END: NORMAL
MDC_IDC_STAT_AT_DTM_START: NORMAL
MDC_IDC_STAT_BRADY_AP_VP_PERCENT: 0 %
MDC_IDC_STAT_BRADY_AP_VS_PERCENT: 0 %
MDC_IDC_STAT_BRADY_AS_VP_PERCENT: 0.03 %
MDC_IDC_STAT_BRADY_AS_VS_PERCENT: 99.96 %
MDC_IDC_STAT_BRADY_DTM_END: NORMAL
MDC_IDC_STAT_BRADY_DTM_START: NORMAL
MDC_IDC_STAT_BRADY_RA_PERCENT_PACED: 0.01 %
MDC_IDC_STAT_BRADY_RV_PERCENT_PACED: 0.04 %
MDC_IDC_STAT_EPISODE_RECENT_COUNT: 0
MDC_IDC_STAT_EPISODE_RECENT_COUNT_DTM_END: NORMAL
MDC_IDC_STAT_EPISODE_RECENT_COUNT_DTM_START: NORMAL
MDC_IDC_STAT_EPISODE_TOTAL_COUNT: 0
MDC_IDC_STAT_EPISODE_TOTAL_COUNT: 1
MDC_IDC_STAT_EPISODE_TOTAL_COUNT: 1
MDC_IDC_STAT_EPISODE_TOTAL_COUNT: 2
MDC_IDC_STAT_EPISODE_TOTAL_COUNT: 9
MDC_IDC_STAT_EPISODE_TOTAL_COUNT_DTM_END: NORMAL
MDC_IDC_STAT_EPISODE_TOTAL_COUNT_DTM_START: NORMAL
MDC_IDC_STAT_EPISODE_TYPE: NORMAL
MDC_IDC_STAT_TACHYTHERAPY_ATP_DELIVERED_RECENT: 0
MDC_IDC_STAT_TACHYTHERAPY_ATP_DELIVERED_TOTAL: 0
MDC_IDC_STAT_TACHYTHERAPY_RECENT_DTM_END: NORMAL
MDC_IDC_STAT_TACHYTHERAPY_RECENT_DTM_START: NORMAL
MDC_IDC_STAT_TACHYTHERAPY_SHOCKS_ABORTED_RECENT: 0
MDC_IDC_STAT_TACHYTHERAPY_SHOCKS_ABORTED_TOTAL: 0
MDC_IDC_STAT_TACHYTHERAPY_SHOCKS_DELIVERED_RECENT: 0
MDC_IDC_STAT_TACHYTHERAPY_SHOCKS_DELIVERED_TOTAL: 1
MDC_IDC_STAT_TACHYTHERAPY_TOTAL_DTM_END: NORMAL
MDC_IDC_STAT_TACHYTHERAPY_TOTAL_DTM_START: NORMAL
O2/TOTAL GAS SETTING VFR VENT: 100 %
O2/TOTAL GAS SETTING VFR VENT: 21 %
O2/TOTAL GAS SETTING VFR VENT: 40 %
O2/TOTAL GAS SETTING VFR VENT: 60 %
O2/TOTAL GAS SETTING VFR VENT: 75 %
O2/TOTAL GAS SETTING VFR VENT: 80 %
OXYHGB MFR BLD: 97 % (ref 92–100)
OXYHGB MFR BLD: 99 % (ref 92–100)
OXYHGB MFR BLDA: 98 % (ref 75–100)
OXYHGB MFR BLDA: 99 % (ref 92–100)
OXYHGB MFR BLDV: 52 % (ref 70–75)
OXYHGB MFR BLDV: 56 %
OXYHGB MFR BLDV: 62 %
PCO2 BLD: 26 MM HG (ref 35–45)
PCO2 BLD: 29 MM HG (ref 35–45)
PCO2 BLD: 37 MM HG (ref 35–45)
PCO2 BLD: 37 MM HG (ref 35–45)
PCO2 BLD: 40 MM HG (ref 35–45)
PCO2 BLDA: 23 MM HG (ref 35–45)
PCO2 BLDA: 26 MM HG (ref 35–45)
PCO2 BLDA: 35 MM HG (ref 35–45)
PCO2 BLDA: 36 MM HG (ref 35–45)
PCO2 BLDA: 37 MM HG (ref 35–45)
PCO2 BLDA: 40 MM HG (ref 35–45)
PCO2 BLDA: 44 MM HG (ref 35–45)
PCO2 BLDA: 46 MM HG (ref 35–45)
PCO2 BLDV: 37 MM HG (ref 40–50)
PCO2 BLDV: 42 MM HG (ref 40–50)
PCO2 BLDV: 48 MM HG (ref 40–50)
PCO2 BLDV: 48 MM HG (ref 40–50)
PH BLD: 7.39 [PH] (ref 7.35–7.45)
PH BLD: 7.41 [PH] (ref 7.35–7.45)
PH BLD: 7.41 [PH] (ref 7.35–7.45)
PH BLD: 7.48 [PH] (ref 7.35–7.45)
PH BLD: 7.52 [PH] (ref 7.35–7.45)
PH BLDA: 7.28 [PH] (ref 7.35–7.45)
PH BLDA: 7.3 [PH] (ref 7.35–7.45)
PH BLDA: 7.34 [PH] (ref 7.35–7.45)
PH BLDA: 7.37 [PH] (ref 7.35–7.45)
PH BLDA: 7.38 [PH] (ref 7.35–7.45)
PH BLDA: 7.42 [PH] (ref 7.35–7.45)
PH BLDA: 7.47 [PH] (ref 7.35–7.45)
PH BLDA: 7.53 [PH] (ref 7.35–7.45)
PH BLDV: 7.3 [PH] (ref 7.32–7.43)
PH BLDV: 7.34 [PH] (ref 7.32–7.43)
PH BLDV: 7.41 [PH] (ref 7.32–7.43)
PH BLDV: 7.42 [PH] (ref 7.32–7.43)
PHOSPHATE SERPL-MCNC: 1.4 MG/DL (ref 2.5–4.5)
PHOSPHATE SERPL-MCNC: 3.5 MG/DL (ref 2.5–4.5)
PHOSPHATE SERPL-MCNC: 5.2 MG/DL (ref 2.5–4.5)
PLATELET # BLD AUTO: 114 10E3/UL (ref 150–450)
PLATELET # BLD AUTO: 163 10E3/UL (ref 150–450)
PLATELET # BLD AUTO: 188 10E3/UL (ref 150–450)
PLATELET # BLD AUTO: 192 10E3/UL (ref 150–450)
PLATELET # BLD AUTO: 386 10E3/UL (ref 150–450)
PO2 BLD: 189 MM HG (ref 80–105)
PO2 BLD: 192 MM HG (ref 80–105)
PO2 BLD: 194 MM HG (ref 80–105)
PO2 BLD: 194 MM HG (ref 80–105)
PO2 BLD: 231 MM HG (ref 80–105)
PO2 BLDA: 180 MM HG (ref 80–105)
PO2 BLDA: 293 MM HG (ref 80–105)
PO2 BLDA: 306 MM HG (ref 80–105)
PO2 BLDA: 351 MM HG (ref 80–105)
PO2 BLDA: 362 MM HG (ref 80–105)
PO2 BLDA: 412 MM HG (ref 80–105)
PO2 BLDA: 457 MM HG (ref 80–105)
PO2 BLDA: 477 MM HG (ref 80–105)
PO2 BLDV: 30 MM HG (ref 25–47)
PO2 BLDV: 32 MM HG (ref 25–47)
PO2 BLDV: 34 MM HG (ref 25–47)
PO2 BLDV: 42 MM HG (ref 25–47)
POS CUT OFF ALLO B: >93
POS CUT OFF ALLO T: >79
POTASSIUM BLD-SCNC: 3.2 MMOL/L (ref 3.5–5)
POTASSIUM BLD-SCNC: 3.4 MMOL/L (ref 3.5–5)
POTASSIUM BLD-SCNC: 3.5 MMOL/L (ref 3.5–5)
POTASSIUM BLD-SCNC: 3.5 MMOL/L (ref 3.5–5)
POTASSIUM BLD-SCNC: 3.8 MMOL/L (ref 3.5–5)
POTASSIUM BLD-SCNC: 4.1 MMOL/L (ref 3.5–5)
POTASSIUM BLD-SCNC: 4.3 MMOL/L (ref 3.5–5)
POTASSIUM BLD-SCNC: 4.4 MMOL/L (ref 3.5–5)
POTASSIUM SERPL-SCNC: 3.9 MMOL/L (ref 3.4–5.3)
POTASSIUM SERPL-SCNC: 4.4 MMOL/L (ref 3.4–5.3)
POTASSIUM SERPL-SCNC: 4.4 MMOL/L (ref 3.4–5.3)
PROT SERPL-MCNC: 3.8 G/DL (ref 6.4–8.3)
PROT SERPL-MCNC: 4.4 G/DL (ref 6.4–8.3)
PROT SERPL-MCNC: 7.2 G/DL (ref 6.4–8.3)
RBC # BLD AUTO: 2.22 10E6/UL (ref 4.4–5.9)
RBC # BLD AUTO: 2.47 10E6/UL (ref 4.4–5.9)
RBC # BLD AUTO: 2.91 10E6/UL (ref 4.4–5.9)
RBC # BLD AUTO: 2.92 10E6/UL (ref 4.4–5.9)
RBC # BLD AUTO: 3.21 10E6/UL (ref 4.4–5.9)
RESULT ALLO B1: ABNORMAL
RESULT ALLO B2: ABNORMAL
RESULT ALLO B3: ABNORMAL
RESULT ALLO T1: ABNORMAL
RESULT ALLO T2: ABNORMAL
RESULT ALLO T3: ABNORMAL
SA 1 CELL: NORMAL
SA 1 TEST METHOD: NORMAL
SA 2 CELL: NORMAL
SA 2 TEST METHOD: NORMAL
SA1 HI RISK ABY: NORMAL
SA1 MOD RISK ABY: NORMAL
SA2 HI RISK ABY: NORMAL
SA2 MOD RISK ABY: NORMAL
SERUM DATE ALLO B1: ABNORMAL
SERUM DATE ALLO B2: ABNORMAL
SERUM DATE ALLO B3: ABNORMAL
SERUM DATE ALLO T1: ABNORMAL
SERUM DATE ALLO T2: ABNORMAL
SERUM DATE ALLO T3: ABNORMAL
SODIUM BLD-SCNC: 136 MMOL/L (ref 133–144)
SODIUM BLD-SCNC: 136 MMOL/L (ref 133–144)
SODIUM BLD-SCNC: 138 MMOL/L (ref 133–144)
SODIUM BLD-SCNC: 139 MMOL/L (ref 133–144)
SODIUM BLD-SCNC: 140 MMOL/L (ref 133–144)
SODIUM BLD-SCNC: 140 MMOL/L (ref 133–144)
SODIUM SERPL-SCNC: 137 MMOL/L (ref 136–145)
SODIUM SERPL-SCNC: 138 MMOL/L (ref 136–145)
SODIUM SERPL-SCNC: 141 MMOL/L (ref 136–145)
TESTMETHODALLO: ABNORMAL
TREATMENT ALLO B1: ABNORMAL
TREATMENT ALLO B2: ABNORMAL
TREATMENT ALLO B3: ABNORMAL
TREATMENT ALLO T1: ABNORMAL
TREATMENT ALLO T2: ABNORMAL
TREATMENT ALLO T3: ABNORMAL
UFH PPP CHRO-ACNC: 0.56 IU/ML
UFH PPP CHRO-ACNC: <0.1 IU/ML
UNACCEPTABLE ANTIGENS: NORMAL
UNIT ABO/RH: NORMAL
UNIT NUMBER: NORMAL
UNIT STATUS: NORMAL
UNIT TYPE ISBT: 5100
UNIT TYPE ISBT: 6200
UNIT TYPE ISBT: 6200
UNIT TYPE ISBT: 7300
UNOS CPRA: 99
WBC # BLD AUTO: 14.1 10E3/UL (ref 4–11)
WBC # BLD AUTO: 15.3 10E3/UL (ref 4–11)
WBC # BLD AUTO: 18.4 10E3/UL (ref 4–11)
WBC # BLD AUTO: 6.2 10E3/UL (ref 4–11)
WBC # BLD AUTO: 7.8 10E3/UL (ref 4–11)
ZZZSA 1  COMMENTS: NORMAL
ZZZSA 2 COMMENTS: NORMAL

## 2022-07-08 PROCEDURE — 82330 ASSAY OF CALCIUM: CPT | Performed by: STUDENT IN AN ORGANIZED HEALTH CARE EDUCATION/TRAINING PROGRAM

## 2022-07-08 PROCEDURE — 94002 VENT MGMT INPAT INIT DAY: CPT

## 2022-07-08 PROCEDURE — 250N000009 HC RX 250

## 2022-07-08 PROCEDURE — P9041 ALBUMIN (HUMAN),5%, 50ML: HCPCS | Performed by: STUDENT IN AN ORGANIZED HEALTH CARE EDUCATION/TRAINING PROGRAM

## 2022-07-08 PROCEDURE — 272N000204 HC AVALON BI-CAVAL DUAL CANNULA

## 2022-07-08 PROCEDURE — 85027 COMPLETE CBC AUTOMATED: CPT | Performed by: STUDENT IN AN ORGANIZED HEALTH CARE EDUCATION/TRAINING PROGRAM

## 2022-07-08 PROCEDURE — 84155 ASSAY OF PROTEIN SERUM: CPT | Performed by: STUDENT IN AN ORGANIZED HEALTH CARE EDUCATION/TRAINING PROGRAM

## 2022-07-08 PROCEDURE — XW043H4 INTRODUCTION OF SYNTHETIC HUMAN ANGIOTENSIN II INTO CENTRAL VEIN, PERCUTANEOUS APPROACH, NEW TECHNOLOGY GROUP 4: ICD-10-PCS | Performed by: INTERNAL MEDICINE

## 2022-07-08 PROCEDURE — 250N000025 HC SEVOFLURANE, PER MIN: Performed by: THORACIC SURGERY (CARDIOTHORACIC VASCULAR SURGERY)

## 2022-07-08 PROCEDURE — 258N000003 HC RX IP 258 OP 636: Performed by: PHYSICIAN ASSISTANT

## 2022-07-08 PROCEDURE — C1769 GUIDE WIRE: HCPCS | Performed by: THORACIC SURGERY (CARDIOTHORACIC VASCULAR SURGERY)

## 2022-07-08 PROCEDURE — 250N000011 HC RX IP 250 OP 636: Performed by: THORACIC SURGERY (CARDIOTHORACIC VASCULAR SURGERY)

## 2022-07-08 PROCEDURE — 71045 X-RAY EXAM CHEST 1 VIEW: CPT | Mod: 26 | Performed by: RADIOLOGY

## 2022-07-08 PROCEDURE — 84132 ASSAY OF SERUM POTASSIUM: CPT | Performed by: STUDENT IN AN ORGANIZED HEALTH CARE EDUCATION/TRAINING PROGRAM

## 2022-07-08 PROCEDURE — 84100 ASSAY OF PHOSPHORUS: CPT | Performed by: STUDENT IN AN ORGANIZED HEALTH CARE EDUCATION/TRAINING PROGRAM

## 2022-07-08 PROCEDURE — 410N000003 HC PER-PERFUSION 1ST 30 MIN: Performed by: THORACIC SURGERY (CARDIOTHORACIC VASCULAR SURGERY)

## 2022-07-08 PROCEDURE — 250N000011 HC RX IP 250 OP 636: Performed by: STUDENT IN AN ORGANIZED HEALTH CARE EDUCATION/TRAINING PROGRAM

## 2022-07-08 PROCEDURE — 250N000011 HC RX IP 250 OP 636

## 2022-07-08 PROCEDURE — 93005 ELECTROCARDIOGRAM TRACING: CPT

## 2022-07-08 PROCEDURE — 82803 BLOOD GASES ANY COMBINATION: CPT | Performed by: STUDENT IN AN ORGANIZED HEALTH CARE EDUCATION/TRAINING PROGRAM

## 2022-07-08 PROCEDURE — P9016 RBC LEUKOCYTES REDUCED: HCPCS | Performed by: STUDENT IN AN ORGANIZED HEALTH CARE EDUCATION/TRAINING PROGRAM

## 2022-07-08 PROCEDURE — 999N000075 HC STATISTIC IABP MONITORING

## 2022-07-08 PROCEDURE — 02HA0QZ INSERTION OF IMPLANTABLE HEART ASSIST SYSTEM INTO HEART, OPEN APPROACH: ICD-10-PCS | Performed by: THORACIC SURGERY (CARDIOTHORACIC VASCULAR SURGERY)

## 2022-07-08 PROCEDURE — 33979 INSERT INTRACORPOREAL DEVICE: CPT | Mod: GC | Performed by: THORACIC SURGERY (CARDIOTHORACIC VASCULAR SURGERY)

## 2022-07-08 PROCEDURE — 274N000005 HC DEVICE HM POCKET BATTERY HOLSTER, INITIAL ONLY

## 2022-07-08 PROCEDURE — 250N000011 HC RX IP 250 OP 636: Performed by: INTERNAL MEDICINE

## 2022-07-08 PROCEDURE — 274N000003 HC DEVICE HM 14-V LITHIUM BATTERY, INITIAL ONLY, EACH

## 2022-07-08 PROCEDURE — 02HA0RZ INSERTION OF SHORT-TERM EXTERNAL HEART ASSIST SYSTEM INTO HEART, OPEN APPROACH: ICD-10-PCS | Performed by: THORACIC SURGERY (CARDIOTHORACIC VASCULAR SURGERY)

## 2022-07-08 PROCEDURE — 02HA3RZ INSERTION OF SHORT-TERM EXTERNAL HEART ASSIST SYSTEM INTO HEART, PERCUTANEOUS APPROACH: ICD-10-PCS | Performed by: THORACIC SURGERY (CARDIOTHORACIC VASCULAR SURGERY)

## 2022-07-08 PROCEDURE — 272N000437 HC KIT ECMO CIRCUIT ONLY, ADULT

## 2022-07-08 PROCEDURE — 82803 BLOOD GASES ANY COMBINATION: CPT | Performed by: THORACIC SURGERY (CARDIOTHORACIC VASCULAR SURGERY)

## 2022-07-08 PROCEDURE — 258N000003 HC RX IP 258 OP 636: Performed by: INTERNAL MEDICINE

## 2022-07-08 PROCEDURE — 83605 ASSAY OF LACTIC ACID: CPT | Performed by: STUDENT IN AN ORGANIZED HEALTH CARE EDUCATION/TRAINING PROGRAM

## 2022-07-08 PROCEDURE — 82810 BLOOD GASES O2 SAT ONLY: CPT

## 2022-07-08 PROCEDURE — 5A1955Z RESPIRATORY VENTILATION, GREATER THAN 96 CONSECUTIVE HOURS: ICD-10-PCS | Performed by: THORACIC SURGERY (CARDIOTHORACIC VASCULAR SURGERY)

## 2022-07-08 PROCEDURE — P9016 RBC LEUKOCYTES REDUCED: HCPCS | Performed by: INTERNAL MEDICINE

## 2022-07-08 PROCEDURE — 272N000206 HC AVALON INSERTION KIT

## 2022-07-08 PROCEDURE — 85396 CLOTTING ASSAY WHOLE BLOOD: CPT | Performed by: INTERNAL MEDICINE

## 2022-07-08 PROCEDURE — 250N000009 HC RX 250: Performed by: STUDENT IN AN ORGANIZED HEALTH CARE EDUCATION/TRAINING PROGRAM

## 2022-07-08 PROCEDURE — P9037 PLATE PHERES LEUKOREDU IRRAD: HCPCS | Performed by: STUDENT IN AN ORGANIZED HEALTH CARE EDUCATION/TRAINING PROGRAM

## 2022-07-08 PROCEDURE — 999N000157 HC STATISTIC RCP TIME EA 10 MIN

## 2022-07-08 PROCEDURE — C1898 LEAD, PMKR, OTHER THAN TRANS: HCPCS | Performed by: THORACIC SURGERY (CARDIOTHORACIC VASCULAR SURGERY)

## 2022-07-08 PROCEDURE — 85610 PROTHROMBIN TIME: CPT | Performed by: STUDENT IN AN ORGANIZED HEALTH CARE EDUCATION/TRAINING PROGRAM

## 2022-07-08 PROCEDURE — 272N000001 HC OR GENERAL SUPPLY STERILE: Performed by: THORACIC SURGERY (CARDIOTHORACIC VASCULAR SURGERY)

## 2022-07-08 PROCEDURE — 272N000100 HC PUMP CENTRIMAG ADULT

## 2022-07-08 PROCEDURE — 258N000003 HC RX IP 258 OP 636

## 2022-07-08 PROCEDURE — 85730 THROMBOPLASTIN TIME PARTIAL: CPT | Performed by: STUDENT IN AN ORGANIZED HEALTH CARE EDUCATION/TRAINING PROGRAM

## 2022-07-08 PROCEDURE — C1713 ANCHOR/SCREW BN/BN,TIS/BN: HCPCS | Performed by: THORACIC SURGERY (CARDIOTHORACIC VASCULAR SURGERY)

## 2022-07-08 PROCEDURE — 71045 X-RAY EXAM CHEST 1 VIEW: CPT

## 2022-07-08 PROCEDURE — 84100 ASSAY OF PHOSPHORUS: CPT | Performed by: INTERNAL MEDICINE

## 2022-07-08 PROCEDURE — 85384 FIBRINOGEN ACTIVITY: CPT | Performed by: STUDENT IN AN ORGANIZED HEALTH CARE EDUCATION/TRAINING PROGRAM

## 2022-07-08 PROCEDURE — 999N000182 XR SURGERY CARM FLUORO GREATER THAN 5 MIN: Mod: TC

## 2022-07-08 PROCEDURE — 99233 SBSQ HOSP IP/OBS HIGH 50: CPT | Mod: GC | Performed by: INTERNAL MEDICINE

## 2022-07-08 PROCEDURE — 250N000009 HC RX 250: Performed by: PHYSICIAN ASSISTANT

## 2022-07-08 PROCEDURE — 85520 HEPARIN ASSAY: CPT | Performed by: INTERNAL MEDICINE

## 2022-07-08 PROCEDURE — 5A1522F EXTRACORPOREAL OXYGENATION, MEMBRANE, CENTRAL: ICD-10-PCS | Performed by: THORACIC SURGERY (CARDIOTHORACIC VASCULAR SURGERY)

## 2022-07-08 PROCEDURE — 360N000079 HC SURGERY LEVEL 6, PER MIN: Performed by: THORACIC SURGERY (CARDIOTHORACIC VASCULAR SURGERY)

## 2022-07-08 PROCEDURE — 85610 PROTHROMBIN TIME: CPT | Performed by: INTERNAL MEDICINE

## 2022-07-08 PROCEDURE — 274N000010 HC DEVICE HM III CONTROLLER, INITIAL ONLY, EACH

## 2022-07-08 PROCEDURE — 85396 CLOTTING ASSAY WHOLE BLOOD: CPT

## 2022-07-08 PROCEDURE — 410N000004: Performed by: THORACIC SURGERY (CARDIOTHORACIC VASCULAR SURGERY)

## 2022-07-08 PROCEDURE — 85347 COAGULATION TIME ACTIVATED: CPT

## 2022-07-08 PROCEDURE — 250N000011 HC RX IP 250 OP 636: Performed by: PHYSICIAN ASSISTANT

## 2022-07-08 PROCEDURE — 82805 BLOOD GASES W/O2 SATURATION: CPT | Performed by: STUDENT IN AN ORGANIZED HEALTH CARE EDUCATION/TRAINING PROGRAM

## 2022-07-08 PROCEDURE — 85730 THROMBOPLASTIN TIME PARTIAL: CPT

## 2022-07-08 PROCEDURE — 250N000024 HC ISOFLURANE, PER MIN: Performed by: THORACIC SURGERY (CARDIOTHORACIC VASCULAR SURGERY)

## 2022-07-08 PROCEDURE — 272N000002 HC OR SUPPLY OTHER OPNP: Performed by: THORACIC SURGERY (CARDIOTHORACIC VASCULAR SURGERY)

## 2022-07-08 PROCEDURE — 80053 COMPREHEN METABOLIC PANEL: CPT | Performed by: STUDENT IN AN ORGANIZED HEALTH CARE EDUCATION/TRAINING PROGRAM

## 2022-07-08 PROCEDURE — 85027 COMPLETE CBC AUTOMATED: CPT | Performed by: THORACIC SURGERY (CARDIOTHORACIC VASCULAR SURGERY)

## 2022-07-08 PROCEDURE — 85384 FIBRINOGEN ACTIVITY: CPT

## 2022-07-08 PROCEDURE — 272N000085 HC PACK CELL SAVER CSP: Performed by: THORACIC SURGERY (CARDIOTHORACIC VASCULAR SURGERY)

## 2022-07-08 PROCEDURE — 258N000003 HC RX IP 258 OP 636: Performed by: STUDENT IN AN ORGANIZED HEALTH CARE EDUCATION/TRAINING PROGRAM

## 2022-07-08 PROCEDURE — 274N000002 HC DEVICE HM 14-V LITH BATTERY CLIP, INITIAL ONLY

## 2022-07-08 PROCEDURE — 88307 TISSUE EXAM BY PATHOLOGIST: CPT | Mod: TC | Performed by: THORACIC SURGERY (CARDIOTHORACIC VASCULAR SURGERY)

## 2022-07-08 PROCEDURE — 274N000011 HC DEVICE HM III IMPLANT KIT

## 2022-07-08 PROCEDURE — 83735 ASSAY OF MAGNESIUM: CPT | Performed by: STUDENT IN AN ORGANIZED HEALTH CARE EDUCATION/TRAINING PROGRAM

## 2022-07-08 PROCEDURE — 370N000017 HC ANESTHESIA TECHNICAL FEE, PER MIN: Performed by: THORACIC SURGERY (CARDIOTHORACIC VASCULAR SURGERY)

## 2022-07-08 PROCEDURE — 272N000306 HC DEVICE OXYGENATOR QUADROX ECMO, ADULT

## 2022-07-08 PROCEDURE — 85610 PROTHROMBIN TIME: CPT

## 2022-07-08 PROCEDURE — P9059 PLASMA, FRZ BETWEEN 8-24HOUR: HCPCS | Performed by: STUDENT IN AN ORGANIZED HEALTH CARE EDUCATION/TRAINING PROGRAM

## 2022-07-08 PROCEDURE — 33946 ECMO/ECLS INITIATION VENOUS: CPT

## 2022-07-08 PROCEDURE — 93287 PERI-PX DEVICE EVAL & PRGR: CPT | Mod: 26 | Performed by: INTERNAL MEDICINE

## 2022-07-08 PROCEDURE — 93287 PERI-PX DEVICE EVAL & PRGR: CPT

## 2022-07-08 PROCEDURE — 999N000065 XR ABDOMEN PORT 1 VIEWS

## 2022-07-08 PROCEDURE — 200N000002 HC R&B ICU UMMC

## 2022-07-08 PROCEDURE — 82803 BLOOD GASES ANY COMBINATION: CPT

## 2022-07-08 PROCEDURE — 84132 ASSAY OF SERUM POTASSIUM: CPT

## 2022-07-08 PROCEDURE — 99291 CRITICAL CARE FIRST HOUR: CPT | Mod: 24 | Performed by: STUDENT IN AN ORGANIZED HEALTH CARE EDUCATION/TRAINING PROGRAM

## 2022-07-08 PROCEDURE — 88307 TISSUE EXAM BY PATHOLOGIST: CPT | Mod: 26 | Performed by: PATHOLOGY

## 2022-07-08 PROCEDURE — C1894 INTRO/SHEATH, NON-LASER: HCPCS | Performed by: THORACIC SURGERY (CARDIOTHORACIC VASCULAR SURGERY)

## 2022-07-08 PROCEDURE — 999N000065 XR CHEST PORT 1 VIEW

## 2022-07-08 PROCEDURE — 999N000185 HC STATISTIC TRANSPORT TIME EA 15 MIN

## 2022-07-08 PROCEDURE — 74018 RADEX ABDOMEN 1 VIEW: CPT | Mod: 26 | Performed by: RADIOLOGY

## 2022-07-08 PROCEDURE — 5A1221Z PERFORMANCE OF CARDIAC OUTPUT, CONTINUOUS: ICD-10-PCS | Performed by: THORACIC SURGERY (CARDIOTHORACIC VASCULAR SURGERY)

## 2022-07-08 PROCEDURE — P9041 ALBUMIN (HUMAN),5%, 50ML: HCPCS

## 2022-07-08 PROCEDURE — 93010 ELECTROCARDIOGRAM REPORT: CPT | Performed by: INTERNAL MEDICINE

## 2022-07-08 PROCEDURE — 85027 COMPLETE CBC AUTOMATED: CPT

## 2022-07-08 PROCEDURE — 250N000013 HC RX MED GY IP 250 OP 250 PS 637: Performed by: STUDENT IN AN ORGANIZED HEALTH CARE EDUCATION/TRAINING PROGRAM

## 2022-07-08 PROCEDURE — 272N000088 HC PUMP APP ADULT PERFUSION: Performed by: THORACIC SURGERY (CARDIOTHORACIC VASCULAR SURGERY)

## 2022-07-08 PROCEDURE — 94799 UNLISTED PULMONARY SVC/PX: CPT

## 2022-07-08 PROCEDURE — 83735 ASSAY OF MAGNESIUM: CPT | Performed by: INTERNAL MEDICINE

## 2022-07-08 PROCEDURE — 250N000009 HC RX 250: Performed by: THORACIC SURGERY (CARDIOTHORACIC VASCULAR SURGERY)

## 2022-07-08 PROCEDURE — 250N000015 HC RX FACTOR IP MED 636 OP 636: Performed by: STUDENT IN AN ORGANIZED HEALTH CARE EDUCATION/TRAINING PROGRAM

## 2022-07-08 DEVICE — COR-KNOT MINI® COMBO KIT
Type: IMPLANTABLE DEVICE | Site: HEART | Status: FUNCTIONAL
Brand: COR-KNOT MINI®

## 2022-07-08 DEVICE — COR-KNOT® QUICK LOAD® 6-POUCH
Type: IMPLANTABLE DEVICE | Site: HEART | Status: FUNCTIONAL
Brand: COR-KNOT® QUICK LOAD®

## 2022-07-08 DEVICE — IMPLANTABLE DEVICE: Type: IMPLANTABLE DEVICE | Site: HEART | Status: FUNCTIONAL

## 2022-07-08 DEVICE — COR-KNOT® QUICK LOAD® SINGLES
Type: IMPLANTABLE DEVICE | Site: HEART | Status: FUNCTIONAL
Brand: COR-KNOT® QUICK LOAD®

## 2022-07-08 RX ORDER — HYDRALAZINE HYDROCHLORIDE 20 MG/ML
10 INJECTION INTRAMUSCULAR; INTRAVENOUS EVERY 30 MIN PRN
Status: DISCONTINUED | OUTPATIENT
Start: 2022-07-08 | End: 2022-07-14

## 2022-07-08 RX ORDER — PROPOFOL 10 MG/ML
5-75 INJECTION, EMULSION INTRAVENOUS CONTINUOUS
Status: DISCONTINUED | OUTPATIENT
Start: 2022-07-08 | End: 2022-07-16

## 2022-07-08 RX ORDER — NOREPINEPHRINE BITARTRATE 0.06 MG/ML
.01-.6 INJECTION, SOLUTION INTRAVENOUS CONTINUOUS
Status: DISCONTINUED | OUTPATIENT
Start: 2022-07-08 | End: 2022-07-08 | Stop reason: HOSPADM

## 2022-07-08 RX ORDER — MAGNESIUM SULFATE 1 G/100ML
INJECTION INTRAVENOUS PRN
Status: DISCONTINUED | OUTPATIENT
Start: 2022-07-08 | End: 2022-07-08

## 2022-07-08 RX ORDER — POLYETHYLENE GLYCOL 3350 17 G/17G
17 POWDER, FOR SOLUTION ORAL DAILY
Status: DISCONTINUED | OUTPATIENT
Start: 2022-07-09 | End: 2022-07-20

## 2022-07-08 RX ORDER — PROTAMINE SULFATE 10 MG/ML
25 INJECTION, SOLUTION INTRAVENOUS ONCE
Status: COMPLETED | OUTPATIENT
Start: 2022-07-08 | End: 2022-07-08

## 2022-07-08 RX ORDER — DEXMEDETOMIDINE HYDROCHLORIDE 4 UG/ML
INJECTION, SOLUTION INTRAVENOUS CONTINUOUS PRN
Status: DISCONTINUED | OUTPATIENT
Start: 2022-07-08 | End: 2022-07-08

## 2022-07-08 RX ORDER — PROCHLORPERAZINE MALEATE 5 MG
10 TABLET ORAL EVERY 6 HOURS PRN
Status: DISCONTINUED | OUTPATIENT
Start: 2022-07-08 | End: 2022-08-21 | Stop reason: HOSPADM

## 2022-07-08 RX ORDER — ALBUMIN, HUMAN INJ 5% 5 %
SOLUTION INTRAVENOUS CONTINUOUS PRN
Status: DISCONTINUED | OUTPATIENT
Start: 2022-07-08 | End: 2022-07-08

## 2022-07-08 RX ORDER — CALCIUM GLUCONATE 20 MG/ML
1 INJECTION, SOLUTION INTRAVENOUS
Status: ACTIVE | OUTPATIENT
Start: 2022-07-08 | End: 2022-07-11

## 2022-07-08 RX ORDER — ACETAMINOPHEN 325 MG/1
975 TABLET ORAL ONCE
Status: DISCONTINUED | OUTPATIENT
Start: 2022-07-08 | End: 2022-07-08

## 2022-07-08 RX ORDER — BISACODYL 10 MG
10 SUPPOSITORY, RECTAL RECTAL DAILY PRN
Status: DISCONTINUED | OUTPATIENT
Start: 2022-07-08 | End: 2022-08-21 | Stop reason: HOSPADM

## 2022-07-08 RX ORDER — ALBUMIN, HUMAN INJ 5% 5 %
250 SOLUTION INTRAVENOUS
Status: DISCONTINUED | OUTPATIENT
Start: 2022-07-08 | End: 2022-08-09

## 2022-07-08 RX ORDER — NALOXONE HYDROCHLORIDE 0.4 MG/ML
0.4 INJECTION, SOLUTION INTRAMUSCULAR; INTRAVENOUS; SUBCUTANEOUS
Status: DISCONTINUED | OUTPATIENT
Start: 2022-07-08 | End: 2022-08-21 | Stop reason: HOSPADM

## 2022-07-08 RX ORDER — ACETAMINOPHEN 325 MG/1
650 TABLET ORAL EVERY 4 HOURS PRN
Status: DISCONTINUED | OUTPATIENT
Start: 2022-07-11 | End: 2022-08-21 | Stop reason: HOSPADM

## 2022-07-08 RX ORDER — ONDANSETRON 2 MG/ML
4 INJECTION INTRAMUSCULAR; INTRAVENOUS EVERY 6 HOURS PRN
Status: DISCONTINUED | OUTPATIENT
Start: 2022-07-08 | End: 2022-08-21 | Stop reason: HOSPADM

## 2022-07-08 RX ORDER — HYDROMORPHONE HCL IN WATER/PF 6 MG/30 ML
0.4 PATIENT CONTROLLED ANALGESIA SYRINGE INTRAVENOUS
Status: DISCONTINUED | OUTPATIENT
Start: 2022-07-08 | End: 2022-08-02

## 2022-07-08 RX ORDER — ONDANSETRON 4 MG/1
4 TABLET, ORALLY DISINTEGRATING ORAL EVERY 6 HOURS PRN
Status: DISCONTINUED | OUTPATIENT
Start: 2022-07-08 | End: 2022-08-21 | Stop reason: HOSPADM

## 2022-07-08 RX ORDER — DOBUTAMINE HYDROCHLORIDE 200 MG/100ML
2.5 INJECTION INTRAVENOUS CONTINUOUS
Status: DISCONTINUED | OUTPATIENT
Start: 2022-07-08 | End: 2022-07-10

## 2022-07-08 RX ORDER — DEXTROSE MONOHYDRATE 25 G/50ML
25-50 INJECTION, SOLUTION INTRAVENOUS
Status: DISCONTINUED | OUTPATIENT
Start: 2022-07-08 | End: 2022-07-13

## 2022-07-08 RX ORDER — VANCOMYCIN HYDROCHLORIDE 1 G/200ML
1000 INJECTION, SOLUTION INTRAVENOUS EVERY 12 HOURS
Status: COMPLETED | OUTPATIENT
Start: 2022-07-08 | End: 2022-07-10

## 2022-07-08 RX ORDER — NALOXONE HYDROCHLORIDE 0.4 MG/ML
0.2 INJECTION, SOLUTION INTRAMUSCULAR; INTRAVENOUS; SUBCUTANEOUS
Status: DISCONTINUED | OUTPATIENT
Start: 2022-07-08 | End: 2022-08-21 | Stop reason: HOSPADM

## 2022-07-08 RX ORDER — GABAPENTIN 100 MG/1
100 CAPSULE ORAL 3 TIMES DAILY
Status: DISCONTINUED | OUTPATIENT
Start: 2022-07-08 | End: 2022-07-09

## 2022-07-08 RX ORDER — MUPIROCIN 20 MG/G
1 OINTMENT TOPICAL 2 TIMES DAILY
Status: DISPENSED | OUTPATIENT
Start: 2022-07-08 | End: 2022-07-12

## 2022-07-08 RX ORDER — DEXMEDETOMIDINE HYDROCHLORIDE 4 UG/ML
.2-.7 INJECTION, SOLUTION INTRAVENOUS CONTINUOUS
Status: DISCONTINUED | OUTPATIENT
Start: 2022-07-08 | End: 2022-07-08

## 2022-07-08 RX ORDER — SODIUM CHLORIDE, SODIUM LACTATE, POTASSIUM CHLORIDE, CALCIUM CHLORIDE 600; 310; 30; 20 MG/100ML; MG/100ML; MG/100ML; MG/100ML
INJECTION, SOLUTION INTRAVENOUS CONTINUOUS
Status: DISCONTINUED | OUTPATIENT
Start: 2022-07-08 | End: 2022-07-08

## 2022-07-08 RX ORDER — HEPARIN SODIUM 1000 [USP'U]/ML
INJECTION, SOLUTION INTRAVENOUS; SUBCUTANEOUS PRN
Status: DISCONTINUED | OUTPATIENT
Start: 2022-07-08 | End: 2022-07-08

## 2022-07-08 RX ORDER — NOREPINEPHRINE BITARTRATE 0.06 MG/ML
.01-.4 INJECTION, SOLUTION INTRAVENOUS CONTINUOUS PRN
Status: DISCONTINUED | OUTPATIENT
Start: 2022-07-08 | End: 2022-07-08

## 2022-07-08 RX ORDER — OXYCODONE HYDROCHLORIDE 5 MG/1
5 TABLET ORAL EVERY 4 HOURS PRN
Status: DISCONTINUED | OUTPATIENT
Start: 2022-07-08 | End: 2022-08-21 | Stop reason: HOSPADM

## 2022-07-08 RX ORDER — HEPARIN SODIUM 10000 [USP'U]/100ML
0-5000 INJECTION, SOLUTION INTRAVENOUS CONTINUOUS
Status: DISCONTINUED | OUTPATIENT
Start: 2022-07-08 | End: 2022-07-08

## 2022-07-08 RX ORDER — HYDROMORPHONE HCL IN WATER/PF 6 MG/30 ML
0.2 PATIENT CONTROLLED ANALGESIA SYRINGE INTRAVENOUS
Status: DISCONTINUED | OUTPATIENT
Start: 2022-07-08 | End: 2022-08-02

## 2022-07-08 RX ORDER — AMOXICILLIN 250 MG
1 CAPSULE ORAL 2 TIMES DAILY
Status: DISCONTINUED | OUTPATIENT
Start: 2022-07-08 | End: 2022-07-20

## 2022-07-08 RX ORDER — LIDOCAINE 40 MG/G
CREAM TOPICAL
Status: DISCONTINUED | OUTPATIENT
Start: 2022-07-08 | End: 2022-08-21 | Stop reason: HOSPADM

## 2022-07-08 RX ORDER — PROPOFOL 10 MG/ML
INJECTION, EMULSION INTRAVENOUS PRN
Status: DISCONTINUED | OUTPATIENT
Start: 2022-07-08 | End: 2022-07-08

## 2022-07-08 RX ORDER — LEVOFLOXACIN 5 MG/ML
500 INJECTION, SOLUTION INTRAVENOUS EVERY 24 HOURS
Status: DISCONTINUED | OUTPATIENT
Start: 2022-07-09 | End: 2022-07-08

## 2022-07-08 RX ORDER — PANTOPRAZOLE SODIUM 40 MG/1
40 TABLET, DELAYED RELEASE ORAL DAILY
Status: DISCONTINUED | OUTPATIENT
Start: 2022-07-09 | End: 2022-07-12

## 2022-07-08 RX ORDER — FENTANYL CITRATE 50 UG/ML
INJECTION, SOLUTION INTRAMUSCULAR; INTRAVENOUS PRN
Status: DISCONTINUED | OUTPATIENT
Start: 2022-07-08 | End: 2022-07-08

## 2022-07-08 RX ORDER — PROPOFOL 10 MG/ML
INJECTION, EMULSION INTRAVENOUS
Status: COMPLETED
Start: 2022-07-08 | End: 2022-07-08

## 2022-07-08 RX ORDER — FIBRINOGEN (HUMAN) 700-1300MG
2200 KIT INTRAVENOUS ONCE
Status: DISCONTINUED | OUTPATIENT
Start: 2022-07-08 | End: 2022-07-08

## 2022-07-08 RX ORDER — ACETAMINOPHEN 325 MG/1
975 TABLET ORAL EVERY 8 HOURS
Status: DISPENSED | OUTPATIENT
Start: 2022-07-08 | End: 2022-07-11

## 2022-07-08 RX ORDER — NICOTINE POLACRILEX 4 MG
15-30 LOZENGE BUCCAL
Status: DISCONTINUED | OUTPATIENT
Start: 2022-07-08 | End: 2022-07-13

## 2022-07-08 RX ORDER — NOREPINEPHRINE BITARTRATE 0.06 MG/ML
.01-.6 INJECTION, SOLUTION INTRAVENOUS CONTINUOUS
Status: DISCONTINUED | OUTPATIENT
Start: 2022-07-08 | End: 2022-07-10

## 2022-07-08 RX ORDER — MILRINONE LACTATE 0.2 MG/ML
0.12 INJECTION, SOLUTION INTRAVENOUS CONTINUOUS
Status: DISCONTINUED | OUTPATIENT
Start: 2022-07-08 | End: 2022-07-08 | Stop reason: HOSPADM

## 2022-07-08 RX ORDER — LIDOCAINE HYDROCHLORIDE 20 MG/ML
INJECTION, SOLUTION INFILTRATION; PERINEURAL PRN
Status: DISCONTINUED | OUTPATIENT
Start: 2022-07-08 | End: 2022-07-08

## 2022-07-08 RX ORDER — HEPARIN SODIUM 5000 [USP'U]/.5ML
5000 INJECTION, SOLUTION INTRAVENOUS; SUBCUTANEOUS EVERY 8 HOURS
Status: DISCONTINUED | OUTPATIENT
Start: 2022-07-09 | End: 2022-07-10

## 2022-07-08 RX ORDER — OXYCODONE HYDROCHLORIDE 10 MG/1
10 TABLET ORAL EVERY 4 HOURS PRN
Status: DISCONTINUED | OUTPATIENT
Start: 2022-07-08 | End: 2022-07-27

## 2022-07-08 RX ORDER — PROTAMINE SULFATE 10 MG/ML
INJECTION, SOLUTION INTRAVENOUS PRN
Status: DISCONTINUED | OUTPATIENT
Start: 2022-07-08 | End: 2022-07-08

## 2022-07-08 RX ORDER — SODIUM CHLORIDE, SODIUM GLUCONATE, SODIUM ACETATE, POTASSIUM CHLORIDE AND MAGNESIUM CHLORIDE 526; 502; 368; 37; 30 MG/100ML; MG/100ML; MG/100ML; MG/100ML; MG/100ML
INJECTION, SOLUTION INTRAVENOUS CONTINUOUS PRN
Status: DISCONTINUED | OUTPATIENT
Start: 2022-07-08 | End: 2022-07-08

## 2022-07-08 RX ORDER — FLUCONAZOLE 2 MG/ML
200 INJECTION, SOLUTION INTRAVENOUS EVERY 24 HOURS
Status: COMPLETED | OUTPATIENT
Start: 2022-07-09 | End: 2022-07-10

## 2022-07-08 RX ORDER — LIDOCAINE 40 MG/G
CREAM TOPICAL
Status: DISCONTINUED | OUTPATIENT
Start: 2022-07-08 | End: 2022-07-08

## 2022-07-08 RX ORDER — CALCIUM GLUCONATE 20 MG/ML
2 INJECTION, SOLUTION INTRAVENOUS
Status: ACTIVE | OUTPATIENT
Start: 2022-07-08 | End: 2022-07-11

## 2022-07-08 RX ORDER — PROTAMINE SULFATE 10 MG/ML
50 INJECTION, SOLUTION INTRAVENOUS ONCE
Status: COMPLETED | OUTPATIENT
Start: 2022-07-08 | End: 2022-07-08

## 2022-07-08 RX ORDER — VASOPRESSIN 20 U/ML
INJECTION PARENTERAL PRN
Status: DISCONTINUED | OUTPATIENT
Start: 2022-07-08 | End: 2022-07-08

## 2022-07-08 RX ADMIN — Medication 100 MG: at 09:39

## 2022-07-08 RX ADMIN — FLUCONAZOLE, SODIUM CHLORIDE 200 MG: 2 INJECTION INTRAVENOUS at 10:05

## 2022-07-08 RX ADMIN — ACETAMINOPHEN 975 MG: 325 TABLET, FILM COATED ORAL at 20:07

## 2022-07-08 RX ADMIN — LIDOCAINE HYDROCHLORIDE 100 MG: 20 INJECTION, SOLUTION INFILTRATION; PERINEURAL at 11:40

## 2022-07-08 RX ADMIN — VASOPRESSIN 2 UNITS: 20 INJECTION INTRAVENOUS at 11:10

## 2022-07-08 RX ADMIN — MUPIROCIN 1 G: 20 OINTMENT TOPICAL at 20:06

## 2022-07-08 RX ADMIN — NOREPINEPHRINE BITARTRATE 12.8 MCG: 1 INJECTION, SOLUTION, CONCENTRATE INTRAVENOUS at 13:34

## 2022-07-08 RX ADMIN — FENTANYL CITRATE 100 MCG: 50 INJECTION, SOLUTION INTRAMUSCULAR; INTRAVENOUS at 16:35

## 2022-07-08 RX ADMIN — EPINEPHRINE 10 MCG: 1 INJECTION INTRAMUSCULAR; INTRAVENOUS; SUBCUTANEOUS at 14:51

## 2022-07-08 RX ADMIN — PROTHROMBIN, COAGULATION FACTOR VII HUMAN, COAGULATION FACTOR IX HUMAN, COAGULATION FACTOR X HUMAN, PROTEIN C, PROTEIN S HUMAN, AND WATER 1093 UNITS: KIT at 23:53

## 2022-07-08 RX ADMIN — EPINEPHRINE 100 MCG: 1 INJECTION INTRAMUSCULAR; INTRAVENOUS; SUBCUTANEOUS at 11:10

## 2022-07-08 RX ADMIN — ANGIOTENSIN II 20 NG/KG/MIN: 2.5 INJECTION INTRAVENOUS at 11:53

## 2022-07-08 RX ADMIN — SUGAMMADEX 200 MG: 100 INJECTION, SOLUTION INTRAVENOUS at 16:35

## 2022-07-08 RX ADMIN — HUMAN INSULIN 2 UNITS/HR: 100 INJECTION, SOLUTION SUBCUTANEOUS at 18:48

## 2022-07-08 RX ADMIN — SODIUM CHLORIDE 7.5 G: 900 INJECTION, SOLUTION INTRAVENOUS at 10:05

## 2022-07-08 RX ADMIN — FENTANYL CITRATE 250 MCG: 50 INJECTION, SOLUTION INTRAMUSCULAR; INTRAVENOUS at 11:04

## 2022-07-08 RX ADMIN — SODIUM CHLORIDE 1.25 G/HR: 900 INJECTION, SOLUTION INTRAVENOUS at 11:05

## 2022-07-08 RX ADMIN — ALBUMIN HUMAN 250 ML: 0.05 INJECTION, SOLUTION INTRAVENOUS at 22:38

## 2022-07-08 RX ADMIN — EPINEPHRINE 10 MCG: 1 INJECTION INTRAMUSCULAR; INTRAVENOUS; SUBCUTANEOUS at 14:46

## 2022-07-08 RX ADMIN — VASOPRESSIN 4 UNITS/HR: 20 INJECTION INTRAVENOUS at 11:40

## 2022-07-08 RX ADMIN — FENTANYL CITRATE 250 MCG: 50 INJECTION, SOLUTION INTRAMUSCULAR; INTRAVENOUS at 09:39

## 2022-07-08 RX ADMIN — FENTANYL CITRATE 250 MCG: 50 INJECTION, SOLUTION INTRAMUSCULAR; INTRAVENOUS at 10:10

## 2022-07-08 RX ADMIN — VANCOMYCIN HYDROCHLORIDE 1000 MG: 1 INJECTION, SOLUTION INTRAVENOUS at 22:38

## 2022-07-08 RX ADMIN — DOBUTAMINE HYDROCHLORIDE 2.5 MCG/KG/MIN: 200 INJECTION INTRAVENOUS at 22:39

## 2022-07-08 RX ADMIN — SENNOSIDES AND DOCUSATE SODIUM 1 TABLET: 8.6; 5 TABLET ORAL at 20:06

## 2022-07-08 RX ADMIN — SALINE NASAL SPRAY 2 SPRAY: 1.5 SOLUTION NASAL at 05:52

## 2022-07-08 RX ADMIN — PROPOFOL 50 MG: 10 INJECTION, EMULSION INTRAVENOUS at 09:39

## 2022-07-08 RX ADMIN — Medication 0.03 MCG/KG/MIN: at 10:05

## 2022-07-08 RX ADMIN — NOREPINEPHRINE BITARTRATE 12.8 MCG: 1 INJECTION, SOLUTION, CONCENTRATE INTRAVENOUS at 13:36

## 2022-07-08 RX ADMIN — PROPOFOL 10 MCG/KG/MIN: 10 INJECTION, EMULSION INTRAVENOUS at 17:08

## 2022-07-08 RX ADMIN — GABAPENTIN 100 MG: 100 CAPSULE ORAL at 20:06

## 2022-07-08 RX ADMIN — Medication 0.07 UNITS/KG/HR: at 11:47

## 2022-07-08 RX ADMIN — EPINEPHRINE 0.03 MCG/KG/MIN: 1 INJECTION INTRAMUSCULAR; INTRAVENOUS; SUBCUTANEOUS at 10:00

## 2022-07-08 RX ADMIN — PROTAMINE SULFATE 50 MG: 10 INJECTION, SOLUTION INTRAVENOUS at 23:34

## 2022-07-08 RX ADMIN — NOREPINEPHRINE BITARTRATE 6.4 MCG: 1 INJECTION, SOLUTION, CONCENTRATE INTRAVENOUS at 09:47

## 2022-07-08 RX ADMIN — SODIUM CHLORIDE, SODIUM GLUCONATE, SODIUM ACETATE, POTASSIUM CHLORIDE AND MAGNESIUM CHLORIDE: 526; 502; 368; 37; 30 INJECTION, SOLUTION INTRAVENOUS at 09:30

## 2022-07-08 RX ADMIN — LIDOCAINE HYDROCHLORIDE 100 MG: 20 INJECTION, SOLUTION INFILTRATION; PERINEURAL at 13:08

## 2022-07-08 RX ADMIN — MIDAZOLAM 2 MG: 1 INJECTION INTRAMUSCULAR; INTRAVENOUS at 09:39

## 2022-07-08 RX ADMIN — CEFEPIME HYDROCHLORIDE 2 G: 2 INJECTION, POWDER, FOR SOLUTION INTRAVENOUS at 18:51

## 2022-07-08 RX ADMIN — HEPARIN SODIUM 25000 UNITS: 1000 INJECTION INTRAVENOUS; SUBCUTANEOUS at 11:10

## 2022-07-08 RX ADMIN — LEVOFLOXACIN 500 MG: 5 INJECTION, SOLUTION INTRAVENOUS at 10:05

## 2022-07-08 RX ADMIN — PROPOFOL 40 MCG/KG/MIN: 10 INJECTION, EMULSION INTRAVENOUS at 21:59

## 2022-07-08 RX ADMIN — SODIUM CHLORIDE, POTASSIUM CHLORIDE, SODIUM LACTATE AND CALCIUM CHLORIDE 250 ML: 600; 310; 30; 20 INJECTION, SOLUTION INTRAVENOUS at 18:24

## 2022-07-08 RX ADMIN — PROTAMINE SULFATE 150 MG: 10 INJECTION, SOLUTION INTRAVENOUS at 13:42

## 2022-07-08 RX ADMIN — EPINEPHRINE 20 MCG: 1 INJECTION INTRAMUSCULAR; INTRAVENOUS; SUBCUTANEOUS at 13:53

## 2022-07-08 RX ADMIN — PROTAMINE SULFATE 25 MG: 10 INJECTION, SOLUTION INTRAVENOUS at 20:05

## 2022-07-08 RX ADMIN — VASOPRESSIN 1 UNITS/HR: 20 INJECTION INTRAVENOUS at 17:12

## 2022-07-08 RX ADMIN — Medication 50 MCG/HR: at 18:09

## 2022-07-08 RX ADMIN — Medication 50 MG: at 12:20

## 2022-07-08 RX ADMIN — MAGNESIUM SULFATE HEPTAHYDRATE 1 MG: 1 INJECTION, SOLUTION INTRAVENOUS at 13:08

## 2022-07-08 RX ADMIN — LIDOCAINE HYDROCHLORIDE 100 MG: 20 INJECTION, SOLUTION INFILTRATION; PERINEURAL at 09:39

## 2022-07-08 RX ADMIN — NOREPINEPHRINE BITARTRATE 12.8 MCG: 1 INJECTION, SOLUTION, CONCENTRATE INTRAVENOUS at 11:10

## 2022-07-08 RX ADMIN — VANCOMYCIN HYDROCHLORIDE 1000 MG: 1 INJECTION, SOLUTION INTRAVENOUS at 10:05

## 2022-07-08 RX ADMIN — EPINEPHRINE 20 MCG: 1 INJECTION INTRAMUSCULAR; INTRAVENOUS; SUBCUTANEOUS at 13:37

## 2022-07-08 RX ADMIN — VASOPRESSIN 1 UNITS/HR: 20 INJECTION INTRAVENOUS at 11:04

## 2022-07-08 RX ADMIN — VASOPRESSIN 2 UNITS: 20 INJECTION INTRAVENOUS at 13:25

## 2022-07-08 RX ADMIN — Medication 1 MCG/KG/HR: at 15:21

## 2022-07-08 RX ADMIN — NOREPINEPHRINE BITARTRATE 12.8 MCG: 1 INJECTION, SOLUTION, CONCENTRATE INTRAVENOUS at 11:09

## 2022-07-08 RX ADMIN — ALBUMIN (HUMAN): 12.5 SOLUTION INTRAVENOUS at 14:25

## 2022-07-08 RX ADMIN — Medication 0.02 MCG/KG/MIN: at 17:13

## 2022-07-08 ASSESSMENT — ACTIVITIES OF DAILY LIVING (ADL)
ADLS_ACUITY_SCORE: 30
ADLS_ACUITY_SCORE: 28
ADLS_ACUITY_SCORE: 32
ADLS_ACUITY_SCORE: 28
ADLS_ACUITY_SCORE: 28
ADLS_ACUITY_SCORE: 34
ADLS_ACUITY_SCORE: 28
ADLS_ACUITY_SCORE: 32
ADLS_ACUITY_SCORE: 32
ADLS_ACUITY_SCORE: 28
ADLS_ACUITY_SCORE: 32
ADLS_ACUITY_SCORE: 28

## 2022-07-08 ASSESSMENT — LIFESTYLE VARIABLES: TOBACCO_USE: 1

## 2022-07-08 NOTE — OP NOTE
OPERATIVE DATE: July 8, 2022    PRE-OPERATIVE DIAGNOSIS:  1) Ischemic cardiomyopathy and heart failure  2) Lupus  3) Hypertension  4) Hyperlipidemia  Patient Active Problem List   Diagnosis     Decompensated heart failure (H)     Cardiogenic shock (H)     POST-OPERATIVE DIAGNOSIS:  1)  Ischemic cardiomyopathy and heart failure  2) Lupus  3) Hypertension  4) Hyperlipidemia  Patient Active Problem List   Diagnosis     Decompensated heart failure (H)     Cardiogenic shock (H)     PROCEDURE:  1) Median sternotomy  2) Implant of HeartMate 3 left ventricular assist device (intracorporeal left ventricular assist device)  3) Temporary right ventricular assist device - 29F Protek duo cannula via right internal jugular vein to Centrimag  4) Transesophageal echocardiography  5) Temporary chest closure    SURGEON: Darius Zamora MD    ASSISTANT: Shaun Arrieta MD; Miguel Iglesias MD    ANESTHESIA: GETA    ESTIMATED BLOOD LOSS: 1000cc    OPERATIVE FINDINGS:  1) Right ventricular function depressed  2) No tricuspid regurgitation  3) Mild aortic insufficiency  4) No patent foramen ovale    INDICATIONS:  Mr. Dandy Sands is a 61 year old male admitted with ischemic cardiomyopathy and decompensated heart failure.  We were asked to evaluate for LVAD implant.  Risks and benefits of the operation were explained to the patient and their family including, but not limited to, bleeding, infection, stroke and even death.  They understood these risks and agreed to proceed electively.    OPERATIVE REPORT:  The patient was transferred to the operating room and positioned supine on the OR table.  General anesthesia was initiated by the anesthesia team.  Endotracheal intubation and central venous access was performed by anesthesia.  The patients neck, chest, abdomen and bilateral lower extremities were clipped, prepped and draped in sterile fashion.  A pre-procedure time-out was performed confirming the correct patient, correct site and correct  procedure.    A median sternotomy was performed.  He had a precipitous decompensation of his hemodynamics and we had to crash onto cardiopulmonary bypass.  The patient was given 400 mg/kg IV heparin.  Pledgeted 2-0 ethibond pursestring was made in the ascending aorta.  A 18F EOPA arterial cannula was placed here and connected to the bypass circuit.  A 2-0 ethibond pursestring was made in the right atrial appendage.  A 28F three stage venous cannula was placed here and connected to the cardiopulmonary bypass circuit.      Cardiopulmonary bypass was initiated with good flows.      The heart was elevated and the left ventricular apex was exposed using lap sponges.  The HM3 sewing cuff was sutured to the left ventricular apex using a modified sew then cut technique.    Pledgeted 2-0 tevdek stitches were placed circumferentially.  The stitches were tied to attach the sewing cuff.  An apical  core was made using the coring knife.  Next the HM3 left ventricular assist device pump was placed.  The driveline was tunneled out the right upper quadrant.     Next, the outflow graft was cut to length.  A partially occluding aortic clamp was applied.  The outflow graft was anastomosed to the ascending aorta in end-to-side fashion using running 4-0 prolene.  The outflow graft was deaired.     The patient was profoundly vasoplegic on bypass.  I decided to place a temporary RVAD.  The right internal jugular access was rewired.  A 6F Roche catheter was used with fluoroscopy and interpretation to gain access to the right pulmonary artery.  The catheter was exchanged for a 0.035inch Lunderquist wire.  The tract was dilated and a 29F Protek cannula was placed.  The Protek was connected to a Centrimag ECMO circuit.    The patient was weaned from cardiopulmonary bypass to LVAD and temporary RVAD support.  Heparin was reversed with protamine.  The patient was coagulopathic.  I decided to pack the chest open.     The sternal retractor was  removed.  Two straight argyle mediastinal chest tubes were placed and bilateral pleural chest tubes were placed.   A 24F pump pocket drain was placed.  These were delivered through the anterior abdominal wall and secured to the skin with 0-ethibond stitches.      The wound was made hemostatic with cautery.  The chest was packed with lap sponges, esmarch and ioban.    The patient was then transferred from the operating bed to an ICU bed and transferred to the ICU in critical, but stable, condition.    All needle, sponge and instrument counts were correct at the end of the case.    Darius Zamora  Cardiothoracic Surgery  Pager: [phone]

## 2022-07-08 NOTE — ANESTHESIA PROCEDURE NOTES
Perioperative TAVARES Procedure Note    Staff -        Anesthesiologist:  Justin Springer MD       Performed By: anesthesiologist  Preanesthesia Checklist:  Patient identified, IV assessed, risks and benefits discussed, monitors and equipment assessed, procedure being performed at surgeon's request and anesthesia consent obtained.    TAVARES Probe Insertion    Probe Status PRE Insertion: NO obvious damage  Probe type:  Adult 3D  Bite block used:   Oral Airway  Insertion Technique: Easy, no oropharyngeal manipulation  Insertion complications: None obvious  Billing Report:TAVARES report by Anesthesiologist (See Separate Report note)  Probe Status POST Removal: NO obvious damage    TAVARES Report  General Procedure Information  Images for this study have been archived.  Modalities: 2D, 3D, CW Doppler, PW Doppler and Color flow mapping  Diagnostic indications for TAVARES:   Non-ischemic cardiomyopathy  Echocardiographic and Doppler Measurements  Right Ventricle:  Cavity size dilated.    Hypertrophy present.   Thrombus not present.    Global function moderately impaired.     Left Ventricle:  Cavity size dilated.    Hypertrophy not present.   Thrombus not present.   Global Function severely impaired.   Ejection Fraction 10%.      Ventricular Regional Function:  1- Basal Anteroseptal:  akinetic  2- Basal Anterior:  akinetic  3- Basal Anterolateral:  akinetic  4- Basal Inferolateral:  akinetic  5- Basal Inferior:  akinetic  6- Basal Inferoseptal:  akinetic  7- Mid Anteroseptal:  akinetic  8- Mid Anterior:  akinetic  9- Mid Anterolateral:  akinetic  10- Mid Inferolateral:  akinetic  11- Mid Inferior:  akinetic  12- Mid Inferoseptal:  akinetic  13- Apical Anterior:  akinetic  14- Apical Lateral:  akinetic  15- Apical Inferior:  akinetic  16- Apical Septal:  akinetic  17- [place]:  akinetic    Valves  Aortic Valve: Annulus normal.  Stenosis not present.  Regurgitation +1.  Leaflets normal.  Leaflet motions normal.    Mitral Valve: Annulus normal.   Stenosis not present.  Regurgitation +4.  Leaflets normal.  Leaflet motions restricted.  Specific leaflet segments with abnormal motions:  P2 and A2  Tricuspid Valve: Annulus normal.  Stenosis not present.  Regurgitation +3.  Leaflets normal.  Leaflet motions normal.    Pulmonic Valve: Annulus normal.  Stenosis not present.  Regurgitation +1.      Aorta: Ascending Aorta: Size normal.  Dissection not present.  Plaque thickness less than 3 mm.  Mobile plaque not present.    Aortic Arch: Size normal.    Dissection not present.   Plaque thickness less than 3 mm.   Mobile plaque not present.    Descending Aorta: Size normal.    Dissection not present.   Plaque thickness less than 3 mm.   Mobile plaque not present.    Other Aortic Findings:  IABP visualized in the descending aorta distal to the aortic arch.  Right Atrium:  Size dilated.   Spontaneous echo contrast not present.   Thrombus not present.   Tumor not present.   Device present.   Left Atrium: Size dilated.  Spontaneous echo contrast not present.  Thrombus not present.  Tumor not present.  Device not present.    Left atrial appendage normal.     Atrial Septum: Intra-atrial septal morphology lipomatous hypertrophy.       Ventricular Septum: Intra-ventricular septum morphology normal.      Diastolic Function Measurements:  Diastolic Dysfunction Grade= indeterminate.  E=  ms.  A=  ms.  E/A Ratio= .  DT=  ms.  S/D= .  IVRT= ms.  Other Findings:   Pericardium:  normal. Pleural Effusion:  none. Pulmonary Arteries:  normal. Pulmonary Venous Flow:  blunted (decreased) systolic flow. Cornoary sinus catheter present.    Post Intervention Findings  Procedure(s) performed:  LVAD Placement and RVAD Placement. Global function:  Unchanged. Regional wall motion: Unchanged. Surgeon(s) notified of all postintervention findings: Yes.        RVAD Placement:  RV decompressed. LVAD Placement:  LV decompressed. PFO not present. Aortic valve opening.    Post Intervention comments:    PRE-CPB:  Severely reduced LV systolic function with LVEF 10%. Severely dilated LV. Moderately reduced RV systolic function. Diastology indeterminant 2/2 IABP. Severe functional MR. Mild-moderate TR. Mild AI. BRETT velocity < 40 cm/s but no thrombus visualized. No intracardiac shunts or thrombus. No pericardial effusion. IABP visualized traversing the aortic arch and terminating in the descending aorta. No aortic dissection. All findings communicated with surgical team.    POST-CPB:  S/p HeartMate III and RVAD. Global LV function unchanged. Global RV function improved with full RVAD support. MR now mild-moderate. LVAD inflow positioned in the apex, slightly toward the anterolateral wall. Good laminar flow and normal inflow velocities. LVAD outflow cannula visualized in the ascending aorta with good laminar flow and normal velocities. AV opening intermittently with unchanged mild AI. No PFO by CFD. RVAD proximal orifice visualized in the RA with proper position. RVAD distal orifice visualized in the main PA with proper position. IABP no longer present in descending aorta. No pericardial effusion. No aortic dissection. All findings communicated with surgical team..    Echocardiogram Comments

## 2022-07-08 NOTE — ANESTHESIA PROCEDURE NOTES
Central Line/PA Catheter Placement    Pre-Procedure   Staff -        Anesthesiologist:  Justin Springer MD       Resident/Fellow: Joseph Fulton       Performed By: resident       Location: OR       Pre-Anesthestic Checklist: patient identified, IV checked, site marked, risks and benefits discussed, informed consent, monitors and equipment checked, pre-op evaluation and at physician/surgeon's request  Timeout:       Correct Patient: Yes        Correct Procedure: Yes        Correct Site: Yes        Correct Position: Yes        Correct Laterality: Yes   Line Placement:   This line was placed Post Induction    Procedure   Procedure: central line       Laterality: right       Insertion Site: internal jugular.  Sterile Prep        All elements of maximal sterile barrier technique followed       Patient Prep/Sterile Barriers: draped, hand hygiene, gloves , hat , mask , draped, gown, sterile gel and probe cover       Skin prep: Chloraprep  Insertion/Injection        Technique: ultrasound guided        1. Ultrasound was used to evaluate the access site.       2. Vein evaluated via ultrasound for patency/adequacy.       3. Using real-time ultrasound the needle/catheter was observed entering the artery/vein.       Introducer Type: 9 Fr, 2-lumen MAC        Type: PA/CVC with Introducer       Catheter Size: 8.5 Fr       Catheter Length: 15       Number of Lumens: double lumen       PA Catheter Type: CCO         Appropriate RV, RA and PA waveforms noted:  Yes            Distance to Wedge Position cm: 52            Withdrawn and Locked at cm: 52  Narrative         Secured by: suture       Tegaderm dressing used.       Complications: None apparent,        blood aspirated from all lumens,        All lumens flushed: Yes       Verification method: Ultrasound and TAVARES       Tip termination: right atrium

## 2022-07-08 NOTE — ANESTHESIA POSTPROCEDURE EVALUATION
Patient: Dandy Sands    Procedure: Procedure(s):  MEDIAN STERNOTOMY.  CARDIOPULMONARY BYPASS.  INTRAOPERATIVE TRANSESOPHAGEAL ECHOCARDIOGRAM.  IMPLANT LEFT VENTRICULAR ASSIST DEVICE HEARTMATE III.  PLACEMENT OF PERCUTANEOUS RIGHT VENTRICULAR ASSIST DEVICE.  TEMPORARY CHEST CLOSURE       Anesthesia Type:  General    Note:  Disposition: ICU            ICU Sign Out: Anesthesiologist/ICU physician sign out WAS performed   Postop Pain Control: Uneventful            Sign Out: Well controlled pain   PONV: No   Neuro/Psych: Uneventful            Sign Out: PLANNED postop sedation   Airway/Respiratory: Uneventful            Sign Out: AIRWAY IN SITU/Resp. Support               Airway in situ/Resp. Support: ETT                 Reason: Planned Pre-op   CV/Hemodynamics: Uneventful            Sign Out: Acceptable CV status; No obvious hypovolemia; No obvious fluid overload   Other NRE: NONE   DID A NON-ROUTINE EVENT OCCUR? No    Event details/Postop Comments:  Patient transported from OR to ICU w/ O2, ambu, Ashli, infusions, emergency medications, emergency airway equipment, RVAD, LVAD nurse, perfusionist and respiratory therapist. VSS throughout transport and handoff.           Last vitals:  Vitals:    07/08/22 0600 07/08/22 0700 07/08/22 0800   BP: 107/74 105/76 107/73   Pulse: 81 76 79   Resp: 20 23 17   Temp:      SpO2: 97% 96% 96%       Electronically Signed By: Justin Springer MD  July 8, 2022  4:53 PM   paty all pertinent systems negative

## 2022-07-08 NOTE — ANESTHESIA PROCEDURE NOTES
Airway       Patient location during procedure: OR       Procedure Start/Stop Times: 7/8/2022 9:40 AM  Staff -        Anesthesiologist:  Justin Springer MD       Resident/Fellow: Joseph Fulton       Performed By: residentIndications and Patient Condition       Indications for airway management: franklin-procedural       Induction type:intravenous       Mask difficulty assessment: 1 - vent by mask    Final Airway Details       Final airway type: endotracheal airway       Successful airway: ETT - single  Endotracheal Airway Details        ETT size (mm): 8.0       Cuffed: yes       Successful intubation technique: direct laryngoscopy       DL Blade Type: MAC 4       Grade View of Cords: 1       Adjucts: stylet       Position: Right       Measured from: gums/teeth       Secured at (cm): 23       Bite block used: None    Post intubation assessment        Placement verified by: capnometry, equal breath sounds and chest rise        Number of attempts at approach: 1       Secured with: commercial tube partida       Ease of procedure: easy    Medication(s) Administered   Medication Administration Time: 7/8/2022 9:40 AM

## 2022-07-08 NOTE — PLAN OF CARE
Major Shift Events:  A/Ox4, denies pain, no IABP alarms overnight, SR w/1st degree AV block, voiding in urinal, NPO at midnight, heparin gtt titrated down according to order set  and paused @ 0630 per Cards 2    Plan: Plan for LVAD placement today 7/8    For vital signs and complete assessments, please see documentation flowsheets.

## 2022-07-08 NOTE — PROGRESS NOTES
ECMO Shift Summary: 5036-4325      ECMO Equipment:  Console serial number: Primary [id number], Secondary [id number]  Circuit Lot number: [id number]  Oxygenator Lot number: [id number]      Pump head lot number:  [id number]      Patient remains on VV ECMO, all equipment is functioning and alarms are appropriately set. RPM's 4000 with flow range 3.41 L/min. Sweep gas is at 2 LPM and FiO2 60%. Circuit remains free of air, clot and fibrin. Cannulas are secure with no bleeding from site. Extremities are cool and pale. One unit of PRBC's given.      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      Vent settings:  Vent Mode: CMV/AC  (Continuous Mandatory Ventilation/ Assist Control)  FiO2 (%): 40 %  Resp Rate (Set): 16 breaths/min  Tidal Volume (Set, mL): 450 mL  PEEP (cm H2O): 5 cmH2O  Resp: 17  .    No heparin in use.  ACT was 173 at 1700..    Urine output is 2100 mls since midnight, blood loss was (see RN flowsheet.       Intake/Output Summary (Last 24 hours) at 7/8/2022 1732  Last data filed at 7/8/2022 1700  Gross per 24 hour   Intake 7551.15 ml   Output 2465 ml   Net 5086.15 ml       ECHO:  No results found for this or any previous visit.  No results found for this or any previous visit.      CXR:  Recent Results (from the past 24 hour(s))   XR Chest Port 1 View    Narrative    EXAM: XR CHEST PORT 1 VIEW  7/8/2022 5:45 AM     HISTORY:  IABP positioning       COMPARISON:  7/7/2022    FINDINGS  Technique: Semiupright AP view of the chest.     Devices: Right subclavian approach intra-aortic balloon pump superior  marker projects over the aortic arch, unchanged. Left chest cardiac  conduction device unchanged. Right PICC terminates over the mid SVC.  Left-sided coronary stent.    No new focal airspace opacity.  No discernible pneumothorax.  No  pleural effusion.     Cardiomediastinal silhouette is  stable in size.      Impression    IMPRESSION:     1.  Unchanged position of intra-aortic balloon pump marker.  2. No new focal airspace opacity.    I have personally reviewed the examination and initial interpretation  and I agree with the findings.    LAI HERNADEZ MD         SYSTEM ID:  [id number]   Cardiac Device Check - Inpatient   Result Value    Date Time Interrogation Session [id number]    Implantable Pulse Generator  Medtronic    Implantable Pulse Generator Model [id number] Evera XT DR    Implantable Pulse Generator Serial Number [id number]    Type Interrogation Session In Clinic    Clinic Name [facility]    Implantable Pulse Generator Type Defibrillator    Implantable Pulse Generator Implant Date 20180412    Implantable Lead  Medtronic    Implantable Lead Model 5076 CapSureFix Novus    Implantable Lead Serial Number [id number]    Implantable Lead Implant Date 20060306    Implantable Lead Polarity Type Bipolar Lead    Implantable Lead Location Detail 1 UNKNOWN    Implantable Lead Location Right Atrium    Implantable Lead  Medtronic    Implantable Lead Model 6949 Sprint Philpot    Implantable Lead Serial Number [id number]    Implantable Lead Implant Date 20060306    Implantable Lead Polarity Type Bipolar Lead    Implantable Lead Location Detail 1 UNKNOWN    Implantable Lead Location Right Ventricle    Marcos Setting Mode (NBG Code) MVP_AAI_DDD    Marcos Setting Lower Rate Limit 50    Marcos Setting Maximum Tracking Rate 130    Marcos Setting Maximum Sensor Rate 115    Marcos Setting Hysterisis Rate DISABLED    Marcos Setting TRISTEN Delay Low 350    Marcos Setting PAV Delay Low 350    Marcos Setting AT Mode Switch Rate 171    Lead Channel Setting Sensing Polarity Bipolar    Lead Channel Setting Sensing Anode Location Right Atrium    Lead Channel Setting Sensing Anode Terminal Ring    Lead Channel Setting Sensing Cathode Location Right Atrium    Lead Channel Setting Sensing Cathode Terminal Tip    Lead Channel  Setting Sensing Sensitivity 0.3    Lead Channel Setting Sensing Polarity Bipolar    Lead Channel Setting Sensing Anode Location Right Ventricle    Lead Channel Setting Sensing Anode Terminal Ring    Lead Channel Setting Sensing Cathode Location Right Ventricle    Lead Channel Setting Sensing Cathode Terminal Tip    Lead Channel Setting Sensing Sensitivity 0.3    Lead Channel Setting Pacing Polarity Bipolar    Lead Channel Setting Pacing Anode Location Right Atrium    Lead Channel Setting Pacing Anode Terminal Ring    Lead Channel Setting Sensing Cathode Location Right Atrium    Lead Channel Setting Sensing Cathode Terminal Tip    Lead Channel Setting Pacing Pulse Width 0.4    Lead Channel Setting Pacing Amplitude 1.5    Lead Channel Setting Pacing Capture Mode Adaptive    Lead Channel Setting Pacing Polarity Bipolar    Lead Channel Setting Pacing Anode Location Right Ventricle    Lead Channel Setting Pacing Anode Terminal Ring    Lead Channel Setting Sensing Cathode Location Right Ventricle    Lead Channel Setting Sensing Cathode Terminal Tip    Lead Channel Setting Pacing Pulse Width 0.4    Lead Channel Setting Pacing Amplitude 2    Lead Channel Setting Pacing Capture Mode Adaptive    Zone Setting Type Category VF    Zone Setting Detection Interval 320    Zone Setting Detection Beats Numerator 30    Zone Setting Detection Beats Denominator 40    Zone Setting Type Category VT    Zone Setting Detection Interval 280    Zone Setting Type Category VT    Zone Setting Detection Interval 350    Zone Setting Type Category VT    Zone Setting Detection Interval 400    Zone Setting Type Category ATRIAL_FIBRILLATION    Zone Setting Type Category AT/AF    Zone Setting Detection Interval 350    Lead Channel Impedance Value 342    Lead Channel Sensing Intrinsic Amplitude 1.3    Lead Channel Pacing Threshold Amplitude 0.5    Lead Channel Pacing Threshold Pulse Width 0.4    Lead Channel Impedance Value 418    Lead Channel Impedance  Value 304    Lead Channel Sensing Intrinsic Amplitude 13    Lead Channel Pacing Threshold Amplitude 1.00    Lead Channel Pacing Threshold Pulse Width 0.4    Battery Date Time of Measurements 20220708073822    Battery Status OK    Battery RRT Trigger 2.727    Battery Remaining Longevity 63    Battery Voltage 2.98    Capacitor Charge Type Reformation    Capacitor Last Charge Date Time [id number]    Capacitor Charge Time 3.973    Capacitor Charge Energy 18    Marcos Statistic Date Time Start 20220623111502    Marcos Statistic Date Time End 20220708073636    Marcos Statistic RA Percent Paced 0.01    Marcos Statistic RV Percent Paced 0.04    Marcos Statistic AP  Percent 0    Marcos Statistic AS  Percent 0.03    Marcos Statistic AP VS Percent 0    Marcos Statistic AS VS Percent 99.96    Atrial Tachy Statistic Date Time Start 20220623111502    Atrial Tachy Statistic Date Time End 20220708073636    Atrial Tachy Statistic AT/AF [place] Percent 0    Therapy Statistic Recent Shocks Delivered 0    Therapy Statistic Recent Shocks Aborted 0    Therapy Statistic Recent ATP Delivered 0    Therapy Statistic Recent Date Time Start 67362813971271    Therapy Statistic Recent Date Time End 30322291116529    Therapy Statistic Total Shocks Delivered 1    Therapy Statistic Total Shocks Aborted 0    Therapy Statistic Total ATP Delivered 0    Therapy Statistic Total  Date Time Start 74486739999685    Therapy Statistic Total  Date Time End 88863036682755    Episode Statistic Recent Count 0    Episode Statistic Type Category AT/AF    Episode Statistic Recent Count 0    Episode Statistic Type Category SVT    Episode Statistic Recent Count 0    Episode Statistic Type Category VT    Episode Statistic Recent Count 0    Episode Statistic Type Category VF    Episode Statistic Recent Count 0    Episode Statistic Type Category VT    Episode Statistic Recent Count 0    Episode Statistic Type Category VT    Episode Statistic Recent Count 0    Episode  Statistic Type Category VT    Episode Statistic Recent Date Time Start 86637288170167    Episode Statistic Recent Date Time End 49506028257267    Episode Statistic Recent Date Time Start 87272170325466    Episode Statistic Recent Date Time End 29246429951843    Episode Statistic Recent Date Time Start 98716459281103    Episode Statistic Recent Date Time End 39968048193550    Episode Statistic Recent Date Time Start 80638459191032    Episode Statistic Recent Date Time End 31828377359770    Episode Statistic Recent Date Time Start 37461779771381    Episode Statistic Recent Date Time End 92337407567487    Episode Statistic Recent Date Time Start 03770133330370    Episode Statistic Recent Date Time End 86033613963767    Episode Statistic Recent Date Time Start 63857251696295    Episode Statistic Recent Date Time End 83849463106752    Episode Statistic Total Count 2    Episode Statistic Type Category AT/AF    Episode Statistic Total Count 0    Episode Statistic Type Category SVT    Episode Statistic Total Count 9    Episode Statistic Type Category VT    Episode Statistic Total Count 1    Episode Statistic Type Category VF    Episode Statistic Total Count 0    Episode Statistic Type Category VT    Episode Statistic Total Count 0    Episode Statistic Type Category VT    Episode Statistic Total Count 1    Episode Statistic Type Category VT    Episode Statistic Total Date Time Start 35881913746970    Episode Statistic Total Date Time End 98134896378677    Episode Statistic Total Date Time Start 17590452517196    Episode Statistic Total Date Time End 10308350853030    Episode Statistic Total Date Time Start 62163113493324    Episode Statistic Total Date Time End 04332160456474    Episode Statistic Total Date Time Start 43560080250543    Episode Statistic Total Date Time End 41014847131259    Episode Statistic Total Date Time Start 70532064649257    Episode Statistic Total Date Time End 92625978099262    Episode Statistic  Total Date Time Start 41505865847706    Episode Statistic Total Date Time End 85204840403862    Episode Statistic Total Date Time Start 71181610136727    Episode Statistic Total Date Time End 33572848146324    Narrative    Patient seen in [address] [place] [address] for evaluation and iterative programming of their   ICD per MD orders PRE-OP LVAD implant.    Device: Medtronic [id number] Evera XT   Normal Device Function.  Mode: AAI<=>DDD  bpm  AP: <0.1%  : <0.1%  Intrinsic rhythm: SR @ 80 bpm  Short V-V intervals: 0  Thoracic Impedance: Near reference line.   Lead Trends Appear Stable: Yes  Estimated battery longevity to RRT = 5.2 years. Battery voltage = 2.98 V.  Atrial arrhythmia: Device records 4 AT/AF episodes each lasting < 1 min in   duration for a total time in AT/AF = 14 seconds. No EGMs for further   review.  AF burden: <0.1%  Anticoagulant: Heparin gtt  Ventricular Arrhythmia: None  Setting changes: Tachy therapies disabled for procedure.    Plan: Please page Device RN post-op for reprogramming.  RICKIE Mcpherson RN    Dual lead ICD    I have reviewed and interpreted the device interrogation, settings,   programming and nurse's summary. The device is functioning within normal   device parameters. I agree with the current findings, assessment and plan.   XR Surgery TODD G/T 5 Min Fluoro    Narrative    This exam was marked as non-reportable because it will not be read by a   radiologist or a [place] non-radiologist provider.         Cardiac Device Check - Inpatient   Result Value    Date Time Interrogation Session [id number]    Implantable Pulse Generator  Medtronic    Implantable Pulse Generator Model [id number] Eliseo TIRADO DR    Implantable Pulse Generator Serial Number [id number]    Type Interrogation Session In Clinic    Clinic Name [facility]    Implantable Pulse Generator Type Defibrillator    Implantable Pulse Generator Implant Date 20180412    Implantable Lead   Medtronic    Implantable Lead Model 5076 CapSureFix Novus    Implantable Lead Serial Number [id number]    Implantable Lead Implant Date 20060306    Implantable Lead Polarity Type Bipolar Lead    Implantable Lead Location Detail 1 UNKNOWN    Implantable Lead Location Right Atrium    Implantable Lead  Medtronic    Implantable Lead Model 6949 Fidel Doron    Implantable Lead Serial Number [id number]    Implantable Lead Implant Date 20060306    Implantable Lead Polarity Type Bipolar Lead    Implantable Lead Location Detail 1 UNKNOWN    Implantable Lead Location Right Ventricle    Marcos Setting Mode (NBG Code) MVP_AAI_DDD    Marcos Setting Lower Rate Limit 50    Marcos Setting Maximum Tracking Rate 130    Marcos Setting Maximum Sensor Rate 115    Marcos Setting Hysterisis Rate DISABLED    Marcos Setting TRISTEN Delay Low 350    Marcos Setting PAV Delay Low 350    Macros Setting AT Mode Switch Rate 171    Lead Channel Setting Sensing Polarity Bipolar    Lead Channel Setting Sensing Anode Location Right Atrium    Lead Channel Setting Sensing Anode Terminal Ring    Lead Channel Setting Sensing Cathode Location Right Atrium    Lead Channel Setting Sensing Cathode Terminal Tip    Lead Channel Setting Sensing Sensitivity 0.3    Lead Channel Setting Sensing Polarity Bipolar    Lead Channel Setting Sensing Anode Location Right Ventricle    Lead Channel Setting Sensing Anode Terminal Ring    Lead Channel Setting Sensing Cathode Location Right Ventricle    Lead Channel Setting Sensing Cathode Terminal Tip    Lead Channel Setting Sensing Sensitivity 0.3    Lead Channel Setting Pacing Polarity Bipolar    Lead Channel Setting Pacing Anode Location Right Atrium    Lead Channel Setting Pacing Anode Terminal Ring    Lead Channel Setting Sensing Cathode Location Right Atrium    Lead Channel Setting Sensing Cathode Terminal Tip    Lead Channel Setting Pacing Pulse Width 0.4    Lead Channel Setting Pacing Amplitude 1.5    Lead Channel  Setting Pacing Capture Mode Adaptive    Lead Channel Setting Pacing Polarity Bipolar    Lead Channel Setting Pacing Anode Location Right Ventricle    Lead Channel Setting Pacing Anode Terminal Ring    Lead Channel Setting Sensing Cathode Location Right Ventricle    Lead Channel Setting Sensing Cathode Terminal Tip    Lead Channel Setting Pacing Pulse Width 0.4    Lead Channel Setting Pacing Amplitude 4    Lead Channel Setting Pacing Capture Mode Adaptive    Zone Setting Type Category VF    Zone Setting Detection Interval 320    Zone Setting Detection Beats Numerator 30    Zone Setting Detection Beats Denominator 40    Zone Setting Type Category VT    Zone Setting Detection Interval 280    Zone Setting Type Category VT    Zone Setting Detection Interval 350    Zone Setting Type Category VT    Zone Setting Detection Interval 400    Zone Setting Type Category ATRIAL_FIBRILLATION    Zone Setting Type Category AT/AF    Zone Setting Detection Interval 350    Lead Channel Impedance Value 285    Lead Channel Sensing Intrinsic Amplitude 0.5    Lead Channel Pacing Threshold Amplitude 0.75    Lead Channel Pacing Threshold Pulse Width 0.4    Lead Channel Impedance Value 760    Lead Channel Impedance Value 551    Lead Channel Sensing Intrinsic Amplitude 1.6    Lead Channel Pacing Threshold Amplitude 2.75    Lead Channel Pacing Threshold Pulse Width 0.4    Battery Date Time of Measurements 65398339690533    Battery Status OK    Battery RRT Trigger 2.727    Battery Remaining Longevity 63    Battery Voltage 2.96    Capacitor Charge Type Reformation    Capacitor Last Charge Date Time 49377077274684    Capacitor Charge Time 3.973    Capacitor Charge Energy 18    Marcos Statistic Date Time Start 21092038717471    Marcos Statistic Date Time End 20220708170205    Marcos Statistic RA Percent Paced 0.69    Marcos Statistic RV Percent Paced 7.74    Marcos Statistic AP  Percent 0.52    Marcos Statistic AS  Percent 7.6    Marcos Statistic AP VS  Percent 0.21    Marcos Statistic AS VS Percent 91.67    Atrial Tachy Statistic Date Time Start 20220708073708    Atrial Tachy Statistic Date Time End 20220708170205    Atrial Tachy Statistic AT/AF [place] Percent 1.1    Therapy Statistic Recent Shocks Delivered 0    Therapy Statistic Recent Shocks Aborted 0    Therapy Statistic Recent ATP Delivered 0    Therapy Statistic Recent Date Time Start 20220708073708    Therapy Statistic Recent Date Time End 20220708170205    Therapy Statistic Total Shocks Delivered 1    Therapy Statistic Total Shocks Aborted 0    Therapy Statistic Total ATP Delivered 0    Therapy Statistic Total  Date Time Start 67474645111697    Therapy Statistic Total  Date Time End 20220708170205    Episode Statistic Recent Count 3    Episode Statistic Type Category AT/AF    Episode Statistic Recent Count 0    Episode Statistic Type Category SVT    Episode Statistic Recent Count 0    Episode Statistic Type Category VT    Episode Statistic Recent Count 0    Episode Statistic Type Category VF    Episode Statistic Recent Count 0    Episode Statistic Type Category VT    Episode Statistic Recent Count 0    Episode Statistic Type Category VT    Episode Statistic Recent Count 0    Episode Statistic Type Category VT    Episode Statistic Recent Date Time Start 37128086861628    Episode Statistic Recent Date Time End 20220708170205    Episode Statistic Recent Date Time Start 18977899503533    Episode Statistic Recent Date Time End 20220708170205    Episode Statistic Recent Date Time Start 93783119129549    Episode Statistic Recent Date Time End 20220708170205    Episode Statistic Recent Date Time Start 85482739687732    Episode Statistic Recent Date Time End 20220708170205    Episode Statistic Recent Date Time Start 00679056825811    Episode Statistic Recent Date Time End 20220708170205    Episode Statistic Recent Date Time Start 07737366925480    Episode Statistic Recent Date Time End 20220708170205    Episode  Statistic Recent Date Time Start 65055851966501    Episode Statistic Recent Date Time End 20220708170205    Episode Statistic Total Count 5    Episode Statistic Type Category AT/AF    Episode Statistic Total Count 0    Episode Statistic Type Category SVT    Episode Statistic Total Count 9    Episode Statistic Type Category VT    Episode Statistic Total Count 1    Episode Statistic Type Category VF    Episode Statistic Total Count 0    Episode Statistic Type Category VT    Episode Statistic Total Count 0    Episode Statistic Type Category VT    Episode Statistic Total Count 1    Episode Statistic Type Category VT    Episode Statistic Total Date Time Start 92462872518405    Episode Statistic Total Date Time End 20220708170205    Episode Statistic Total Date Time Start 52185738213991    Episode Statistic Total Date Time End 66804243986598    Episode Statistic Total Date Time Start 49289590527404    Episode Statistic Total Date Time End 18708161616377    Episode Statistic Total Date Time Start 52742854204975    Episode Statistic Total Date Time End 83092321122821    Episode Statistic Total Date Time Start 14503315292419    Episode Statistic Total Date Time End 89755945300482    Episode Statistic Total Date Time Start 21876398706611    Episode Statistic Total Date Time End 91380796800805    Episode Statistic Total Date Time Start 16714325588810    Episode Statistic Total Date Time End 57964734306378    Episode Identifier 28    Episode Type Category Monitor    Episode Date Time 72350740690612    Episode Duration 176    Episode Identifier 27    Episode Type Category Monitor    Episode Date Time 93188698672598    Episode Duration 8    Episode Identifier 26    Episode Type Category Monitor    Episode Date Time [id number]    Episode Duration 79    Narrative    Patient seen in [address] [place] [address] for evaluation and iterative programming of their ICD per MD orders POST LVAD implant.    Device: Medtronic [id number] Evera XT DR  Normal  Device Function.  Mode: AAI<=>DDD /130 bpm  AP: 0.7%  : 7.7%  Intrinsic rhythm: SR @ 85 bpm  Short V-V intervals: 35, likely due to cautery  Lead Trends Appear Stable: RV threshold increased post-op, measuring at 2.75 V @ 0.4 ms. Patient paces minimally in RV at baseline. Programmed RV Amplitude increased and will continue to monitor.  Estimated battery longevity to RRT = 5.2 years. Battery voltage = 2.97 V.  Atrial arrhythmia: 3 AT/AF episodes recorded during procedure today. EGMs reveal ROHIT and FFRW oversensing.  AF burden: 1.1% (erroneous due to ROHIT intra-op)  Anticoagulant: Heparin gtt  Ventricular Arrhythmia: None  Setting changes: Tachy therapies turned back on (VF zone, FVT zone, and VT Monitor). Increased RV amplitude to 4.0 V @ 0.4 ms for additional safety margin.    Plan: Continue inpatient evaluation and treatment.  RICKIE Mcpherson RN    Dual lead ICD    I have reviewed and interpreted the device interrogation, settings, programming and nurse's summary. The device is functioning within normal device parameters. I agree with the current findings, assessment and plan.       Labs:  Recent Labs   Lab 07/08/22  1646 07/08/22  1503 07/08/22  1428 07/08/22  1359   PH 7.52* 7.38 7.47* 7.53*   PCO2 26* 35 26* 23*   PO2 231* 362* 477* 457*   HCO3 21 21 19* 19*   O2PER 40 100.0 100.0 100.0       Lab Results   Component Value Date    HGB 7.2 (L) 07/08/2022    PHGB 110 (H) 07/04/2022     07/08/2022    FIBR 296 07/08/2022    INR 1.87 (H) 07/08/2022    PTT 89 (H) 07/08/2022         Plan is to wean sweep, and splice out oxygenator.  Patient has an open chest.  Dr. Zamora is planning a wash out with possible closure Monday.        Parth Whalen, RT  ECMO Specialist  7/8/2022 5:32 PM

## 2022-07-08 NOTE — PROGRESS NOTES
CV ICU PROGRESS NOTE  7/9/2022       CO-MORBIDITIES  1. Cardiogenic shock (H)  (primary encounter diagnosis)  2. Ischemic cardiomyopathy  3. Heart failure with reduced ejection fraction, NYHA class III (H)  4. Coronary artery disease involving native coronary artery of native heart without angina pectoris      ASSESSMENT Dandy Sands is a 61 year old male with a PMH of CAD s/p PCI to LAD, MI, ICM, acute on chronic heart failure, s/p CRT-D, severe MR, antiphospholipid antibody syndrome on chronic warfarin, SLE, HTN, HLD, recent hospitalization 5/16-5/26 s/p RCA, LAD stenting who underwent RVAD (29F Protek duo RIJ) LVAD placement (HeartMate 3) on 7/8/22 by Dr. Zamora.      PLAN:  Neuro/ pain/ sedation:  #Acute Postoperative pain  #Depression  #Anxiety due to a medical condition  #Insomnia  - Monitor neurological status. Notify the MD for any acute changes in exam.  - Pain: fentanyl gtt. Scheduled tylenol. PRN tylenol, oxycodone, dilaudid.  - Sedation: propofol gtt  - Holding PTA meds: duloxetine 30mg, hydroxyzine 25mg PRN, zolpidem 5mg, melatonin 3mg, lorazepam 0.5mg    Pulmonary:   #Acute hypoxic respiratory failure on iMV  #Mild-moderate emphysema  #History of COVID-19  - Patient with open chest requiring ongoing iMV    Cardiovascular:    #S/p LVAD placement (HeartMate 3) on 7/8/22   #S/p RVAD (29F Protek duo RIJ) on 7/8/22  #Cardiogenic shock  #Advanced ischemic cardiomyopathy, class IV, stage D  #CAD s/p PCI to LAD (2005)  #S/p CRT-D (2006)  #History of MI (2007)  #Severe MR  #Antiphospholipid antibody syndrome on chronic warfarin  #HTN  #HLD  #Recent hospitalization 5/16-5/26 s/p RCA, LAD stenting  - Monitor hemodynamic status  - Goal MAP > 65  - Wean off pressors, inotropes, and nitric oxide as tolerated  - PTA meds: atorvastatin 40mg, clopidogrel 75mg, lasix 40mg, warfarin 5mg  - LVAD management per Advanced HF service      GI / Nutrition:   #GERD  #Congestive hepatopathy in the setting of cardiac  insuffiency  - NPO   - PPI  - Bowel regimen  - No indication for parenteral nutrition.  - Nutrition consulted.  - Trend LFT's    Renal/ Fluid Balance:    - Strict I/O, daily weights  - Avoid/limit nephrotoxins as able  - Replete lytes PRN per protocol  - PTA meds: tamsulosin 0.4mg (held)      Endocrine:    #Stress induced hyperglycemia  Preop A1c 5.5%  - Insulin gtt  - Goal BG <180 for optimal healing      ID/ Antibiotics:  #Periopearive antibiosis  - Cefepime, vancomycin, rifampin, fluconazole    Heme:     #Acute blood loss anemia  #Acute blood loss thrombocytopenia  #History of DVT and PE   #Antiphospholipid antibody syndrome  #History of iron deficiency anemia  - Monitor for s/sx of bleeding  - Trend CBC and coags.  - Hgb goal > 7  - Plt goal > 20    Rheumatology:  #SLE  - Chronic, symptoms include arthritis, photosensitivity, fatigue, and skin manifestations  - PTA meds: hydroxychloroquine 200mg, held     Prophylaxis:    - DVT prophylaxis: SCD, SQH  - PPI  - Bowel regimen  - PT/OT      Disposition:  - CV ICU.        ====================================================    Patient seen, findings and plan discussed with CV ICU staff, Dr. Vance.      Ada Echols MD  Anesthesiology Resident, PGY-4    ====================================================        SUBJECTIVE  Pt  significant bleeding from CT's post op and overnight. 2.2L ttl since CT placement. Pt given 5 units PRBC's, 2FFP, 2PLT 75mg protamine and 1093 units KCentra to meet HGB goals and reverse coagulopathy. Pt RVAD circuit chugged intermittently throughout night requiring fluid (TTL 1L LR and 1L LR).     OBJECTIVE    1. VITAL SIGNS    Temp:  [98.2  F (36.8  C)-98.8  F (37.1  C)] 98.8  F (37.1  C)  Pulse:  [76-96] 91  Resp:  [9-23] 9  BP: (82)/(60) 82/60  MAP:  [72 mmHg-96 mmHg] 78 mmHg  Arterial Line BP: ()/(58-79) 84/63  FiO2 (%):  [40 %-50 %] 40 %  SpO2:  [85 %-100 %] 100 %  Vent Mode: CMV/AC  (Continuous Mandatory Ventilation/ Assist  Control)  FiO2 (%): 40 %  Resp Rate (Set): 10 breaths/min  Tidal Volume (Set, mL): 450 mL  PEEP (cm H2O): 5 cmH2O  Resp: 9        2. INTAKE/ OUTPUT    I/O last 3 completed shifts:  In: 18594.03 [I.V.:7551.03; Other:420; NG/GT:80]  Out: 3555 [Urine:1315; Chest Tube:2240]      3. PHYSICAL EXAMINATION    General: sedated  Neuro: RAAS -2   Resp: intubated  CV: Sinus rhythm; PPM in place  Abdomen: Soft, Non-distended  Skin/Incisions: open chest, right internal jugular cannulation, left femoral CVC/arterial line.  Extremities: warm and well perfused. LUE ecchymosis, unchanged      4. INVESTIGATIONS    Arterial Blood Gases   Recent Labs   Lab 07/09/22  0952 07/09/22  0809 07/09/22  0546 07/09/22  0406   PH 7.34* 7.37 7.39 7.39  7.39   PCO2 46* 42 40 41  41   PO2 175* 178* 107* 170*  170*   HCO3 25 24 24 24  24     Complete Blood Count   Recent Labs   Lab 07/09/22  0952 07/09/22  0408 07/08/22 2213 07/08/22  1830   WBC 5.3 6.5 7.8 15.3*   HGB 9.6* 9.3* 8.7* 8.7*   PLT 87* 117* 114* 188     Basic Metabolic Panel  Recent Labs   Lab 07/09/22  1127 07/09/22  0956 07/09/22  0952 07/09/22  0808 07/09/22  0557 07/09/22  0546 07/09/22  0419 07/09/22  0408 07/08/22  2225 07/08/22  2213 07/08/22  1644 07/08/22  1643   NA  --   --  136  --   --   --   --  136  --  137  --  141   POTASSIUM  --   --  4.8  --   --  5.2  --  5.6*  --  4.4  --  3.9   CHLORIDE  --   --  105  --   --   --   --  105  --  106  --  107   CO2  --   --  23  --   --   --   --  22  --  22  --  20*   BUN  --   --  15.4  --   --   --   --  17.1  --  16.7  --  14.0   CR  --   --  0.71  --   --   --   --  0.77  --  0.78  --  0.73   * 161* 156* 130*   < >  --    < > 123*   < > 101*   < > 108*    < > = values in this interval not displayed.     Liver Function Tests  Recent Labs   Lab 07/09/22  0952 07/09/22  0408 07/08/22  2213 07/08/22  1843 07/08/22  1643 07/08/22  1400 07/08/22  0332 07/07/22  0356   AST  --   --  159*  --  134*  --  32 32   ALT  --  29 29   --  25  --  39 41   ALKPHOS  --   --  59  --  51  --  127 125   BILITOTAL  --   --  1.0  --  1.0  --  0.3 0.3   ALBUMIN  --  2.9* 2.5*  --  2.3*  --  3.5 3.5   INR 1.27* 1.22* 1.45* 1.68* 1.87*   < > 1.21* 1.13    < > = values in this interval not displayed.     Pancreatic Enzymes  No lab results found in last 7 days.  Coagulation Profile  Recent Labs   Lab 07/09/22  0952 07/09/22  0408 07/08/22  2213 07/08/22  1843   INR 1.27* 1.22* 1.45* 1.68*   PTT 45* 43* 52* 105*         5. RADIOLOGY    Recent Results (from the past 24 hour(s))   XR Surgery TODD G/T 5 Min Fluoro    Narrative    This exam was marked as non-reportable because it will not be read by a   radiologist or a [place] non-radiologist provider.         Cardiac Device Check - Inpatient   Result Value    Date Time Interrogation Session [id number]    Implantable Pulse Generator  Medtronic    Implantable Pulse Generator Model [id number] Evera XT DR    Implantable Pulse Generator Serial Number [id number]    Type Interrogation Session In Clinic    Clinic Name [facility]    Implantable Pulse Generator Type Defibrillator    Implantable Pulse Generator Implant Date 20180412    Implantable Lead  Medtronic    Implantable Lead Model 5076 CapSureFix Novus    Implantable Lead Serial Number [id number]    Implantable Lead Implant Date 20060306    Implantable Lead Polarity Type Bipolar Lead    Implantable Lead Location Detail 1 UNKNOWN    Implantable Lead Location Right Atrium    Implantable Lead  Medtronic    Implantable Lead Model 6949 Sprint [place]    Implantable Lead Serial Number [id number]    Implantable Lead Implant Date 20060306    Implantable Lead Polarity Type Bipolar Lead    Implantable Lead Location Detail 1 UNKNOWN    Implantable Lead Location Right Ventricle    Marcos Setting Mode (NBG Code) MVP_AAI_DDD    Marcos Setting Lower Rate Limit 50    Marcos Setting Maximum Tracking Rate 130    Marcos  Setting Maximum Sensor Rate 115    Marcos Setting Hysterisis Rate DISABLED    Amrcos Setting TRISTEN Delay Low 350    Marcos Setting PAV Delay Low 350    Marcos Setting AT Mode Switch Rate 171    Lead Channel Setting Sensing Polarity Bipolar    Lead Channel Setting Sensing Anode Location Right Atrium    Lead Channel Setting Sensing Anode Terminal Ring    Lead Channel Setting Sensing Cathode Location Right Atrium    Lead Channel Setting Sensing Cathode Terminal Tip    Lead Channel Setting Sensing Sensitivity 0.3    Lead Channel Setting Sensing Polarity Bipolar    Lead Channel Setting Sensing Anode Location Right Ventricle    Lead Channel Setting Sensing Anode Terminal Ring    Lead Channel Setting Sensing Cathode Location Right Ventricle    Lead Channel Setting Sensing Cathode Terminal Tip    Lead Channel Setting Sensing Sensitivity 0.3    Lead Channel Setting Pacing Polarity Bipolar    Lead Channel Setting Pacing Anode Location Right Atrium    Lead Channel Setting Pacing Anode Terminal Ring    Lead Channel Setting Sensing Cathode Location Right Atrium    Lead Channel Setting Sensing Cathode Terminal Tip    Lead Channel Setting Pacing Pulse Width 0.4    Lead Channel Setting Pacing Amplitude 1.5    Lead Channel Setting Pacing Capture Mode Adaptive    Lead Channel Setting Pacing Polarity Bipolar    Lead Channel Setting Pacing Anode Location Right Ventricle    Lead Channel Setting Pacing Anode Terminal Ring    Lead Channel Setting Sensing Cathode Location Right Ventricle    Lead Channel Setting Sensing Cathode Terminal Tip    Lead Channel Setting Pacing Pulse Width 0.4    Lead Channel Setting Pacing Amplitude 4    Lead Channel Setting Pacing Capture Mode Adaptive    Zone Setting Type Category VF    Zone Setting Detection Interval 320    Zone Setting Detection Beats Numerator 30    Zone Setting Detection Beats Denominator 40    Zone Setting Type Category VT    Zone Setting Detection Interval 280    Zone Setting Type Category VT     Zone Setting Detection Interval 350    Zone Setting Type Category VT    Zone Setting Detection Interval 400    Zone Setting Type Category ATRIAL_FIBRILLATION    Zone Setting Type Category AT/AF    Zone Setting Detection Interval 350    Lead Channel Impedance Value 285    Lead Channel Sensing Intrinsic Amplitude 0.5    Lead Channel Pacing Threshold Amplitude 0.75    Lead Channel Pacing Threshold Pulse Width 0.4    Lead Channel Impedance Value 760    Lead Channel Impedance Value 551    Lead Channel Sensing Intrinsic Amplitude 1.6    Lead Channel Pacing Threshold Amplitude 2.75    Lead Channel Pacing Threshold Pulse Width 0.4    Battery Date Time of Measurements 20220708170338    Battery Status OK    Battery RRT Trigger 2.727    Battery Remaining Longevity 63    Battery Voltage 2.96    Capacitor Charge Type Reformation    Capacitor Last Charge Date Time [id number]    Capacitor Charge Time 3.973    Capacitor Charge Energy 18    Marcos Statistic Date Time Start 20220708073708    Marcos Statistic Date Time End 20220708170205    Marcos Statistic RA Percent Paced 0.69    Marcos Statistic RV Percent Paced 7.74    Marcos Statistic AP  Percent 0.52    Marcos Statistic AS  Percent 7.6    Marcos Statistic AP VS Percent 0.21    Marcos Statistic AS VS Percent 91.67    Atrial Tachy Statistic Date Time Start 20220708073708    Atrial Tachy Statistic Date Time End 20220708170205    Atrial Tachy Statistic AT/AF [place] Percent 1.1    Therapy Statistic Recent Shocks Delivered 0    Therapy Statistic Recent Shocks Aborted 0    Therapy Statistic Recent ATP Delivered 0    Therapy Statistic Recent Date Time Start 20220708073708    Therapy Statistic Recent Date Time End 20220708170205    Therapy Statistic Total Shocks Delivered 1    Therapy Statistic Total Shocks Aborted 0    Therapy Statistic Total ATP Delivered 0    Therapy Statistic Total  Date Time Start 93684239646363    Therapy Statistic Total  Date Time End 20220708170205    Episode  Statistic Recent Count 3    Episode Statistic Type Category AT/AF    Episode Statistic Recent Count 0    Episode Statistic Type Category SVT    Episode Statistic Recent Count 0    Episode Statistic Type Category VT    Episode Statistic Recent Count 0    Episode Statistic Type Category VF    Episode Statistic Recent Count 0    Episode Statistic Type Category VT    Episode Statistic Recent Count 0    Episode Statistic Type Category VT    Episode Statistic Recent Count 0    Episode Statistic Type Category VT    Episode Statistic Recent Date Time Start 53973473990602    Episode Statistic Recent Date Time End 20220708170205    Episode Statistic Recent Date Time Start 98377213578272    Episode Statistic Recent Date Time End 20220708170205    Episode Statistic Recent Date Time Start 85826601094239    Episode Statistic Recent Date Time End 20220708170205    Episode Statistic Recent Date Time Start 06997100398836    Episode Statistic Recent Date Time End 20220708170205    Episode Statistic Recent Date Time Start 99956157357068    Episode Statistic Recent Date Time End 20220708170205    Episode Statistic Recent Date Time Start 78543317477513    Episode Statistic Recent Date Time End 20220708170205    Episode Statistic Recent Date Time Start 95749282448706    Episode Statistic Recent Date Time End 20220708170205    Episode Statistic Total Count 5    Episode Statistic Type Category AT/AF    Episode Statistic Total Count 0    Episode Statistic Type Category SVT    Episode Statistic Total Count 9    Episode Statistic Type Category VT    Episode Statistic Total Count 1    Episode Statistic Type Category VF    Episode Statistic Total Count 0    Episode Statistic Type Category VT    Episode Statistic Total Count 0    Episode Statistic Type Category VT    Episode Statistic Total Count 1    Episode Statistic Type Category VT    Episode Statistic Total Date Time Start 42548492570239    Episode Statistic Total Date Time End  20220708170205    Episode Statistic Total Date Time Start 20180412151624    Episode Statistic Total Date Time End 20220708170205    Episode Statistic Total Date Time Start 20180412151624    Episode Statistic Total Date Time End 20220708170205    Episode Statistic Total Date Time Start 20180412151624    Episode Statistic Total Date Time End 20220708170205    Episode Statistic Total Date Time Start 20180412151624    Episode Statistic Total Date Time End 20220708170205    Episode Statistic Total Date Time Start 20180412151624    Episode Statistic Total Date Time End 20220708170205    Episode Statistic Total Date Time Start 20180412151624    Episode Statistic Total Date Time End 20220708170205    Episode Identifier 28    Episode Type Category Monitor    Episode Date Time 20220708123149    Episode Duration 176    Episode Identifier 27    Episode Type Category Monitor    Episode Date Time 20220708114306    Episode Duration 8    Episode Identifier 26    Episode Type Category Monitor    Episode Date Time [id number]    Episode Duration 79    Narrative    Patient seen in [address] [place] [address] for evaluation and iterative programming of their ICD per MD orders POST LVAD implant.    Device: Sgnam [id number] Evera XT   Normal Device Function.  Mode: AAI<=>DDD /130 bpm  AP: 0.7%  : 7.7%  Intrinsic rhythm: SR @ 85 bpm  Short V-V intervals: 35, likely due to cautery  Lead Trends Appear Stable: RV threshold increased post-op, measuring at 2.75 V @ 0.4 ms. Patient paces minimally in RV at baseline. Programmed RV Amplitude increased and will continue to monitor.  Estimated battery longevity to RRT = 5.2 years. Battery voltage = 2.97 V.  Atrial arrhythmia: 3 AT/AF episodes recorded during procedure today. EGMs reveal ROHIT and FFRW oversensing.  AF burden: 1.1% (erroneous due to ROHIT intra-op)  Anticoagulant: Heparin gtt  Ventricular Arrhythmia: None  Setting changes: Tachy therapies turned back on (VF zone, FVT zone, and VT Monitor).  Increased RV amplitude to 4.0 V @ 0.4 ms for additional safety margin.    Plan: Continue inpatient evaluation and treatment.  RICKIE Mcpherson RN    Dual lead ICD    I have reviewed and interpreted the device interrogation, settings, programming and nurse's summary. The device is functioning within normal device parameters. I agree with the current findings, assessment and plan.   XR Chest Port 1 View    Narrative    EXAM: XR CHEST PORT 1 VIEW  7/8/2022 7:36 PM     HISTORY:  Post Op CVTS Surgery       COMPARISON:  Radiograph 7/8/2022.    TECHNIQUE: AP supine view of the chest    FINDINGS:   Devices, lines, tubes: Right internal jugular ECMO cannula with tip  projecting over the main pulmonary artery. Left basilar chest tube in  place. LVAD in place. Mediastinal/pericardial drains in place. Packing  material projecting over the mediastinum. Endotracheal tube projecting  over the midthoracic trachea. Left chest wall implantable cardiac  defibrillator and leads in stable positioning. Right upper extremity  PICC appears to have a coil projecting over the right subclavian vein  with tip obscured by overlying catheters and packing material.  Surgical clips projecting over the right axilla.    The trachea is midline. The cardiomediastinal silhouette is stable. No  appreciable pneumothorax, pleural effusion, or focal consolidative  opacity. No acute osseous abnormality.        Impression    IMPRESSION:   1. Endotracheal tube tip projecting over the midthoracic trachea.  2. Right internal jugular ECMO cannula with tip projecting over the  main pulmonary artery.  3. Interval placement of LVAD device with packing material projecting  over the mediastinum.  4. Right upper extremity PICC appears to coil within it projecting  over the right subclavian vein with tip obscured by overlying  catheters and packing material.  5. No focal airspace opacity.    I have personally reviewed the examination and initial interpretation  and I  agree with the findings.    KHADAR CAMPOS MD         SYSTEM ID:  [id number]   XR Abdomen Port 1 View    Narrative    EXAMINATION:  XR ABDOMEN PORT 1 VIEWS 7/8/2022 7:38 PM     COMPARISON: none.    HISTORY: CT 7/3/2022..    TECHNIQUE: Frontal view of the abdomen.    FINDINGS: Enteric tube side port projecting over the stomach and tip  projecting over the pylorus. Interval placement of LVAD. ECMO cannula  tip projecting over the main pulmonary artery. Pericardial/mediastinal  drains in place. Left basilar chest tube. Packing material projecting  over the mediastinum. No abnormally dilated loops of bowel. No  pneumatosis or portal venous gas.       Impression    IMPRESSION:   Enteric tube side-port projecting over the stomach and tip projecting  over the pylorus..    I have personally reviewed the examination and initial interpretation  and I agree with the findings.    KHADAR CAMPOS MD         SYSTEM ID:  [id number]   XR Chest Port 1 View   Result Value    Radiologist flags right picc malposition (Urgent)    Narrative    EXAM: XR CHEST PORT 1 VIEW  7/9/2022 3:03 AM     HISTORY:  Post Op CVTS Surgery       COMPARISON:  7/8/2022    FINDINGS  Technique: Supine AP view of the chest.     Devices: Endotracheal tube terminates over the midthoracic trachea  approximately 6 cm above the paola. Right subclavian approach  intra-aortic balloon pump superior marker projects over the aortic  arch, unchanged. Left chest 3-lead cardiac conduction device. LVAD  device projects over the lower left hemithorax. Mediastinal drains  project over the mediastinum. Right internal jugular ECMO cannula  terminates over the main pulmonary artery. Packing material markers  project over the superior mediastinum. External pacer leads project  over the left chest. Right PICC terminates over the mid SVC.  Left-sided coronary stent.    No new focal airspace opacity.  No discernible pneumothorax.  No  pleural effusion.     Cardiomediastinal  silhouette is  stable in size.      Impression    IMPRESSION:     1. Right internal jugular approach ECMO cannula terminates over the  main pulmonary artery.  2. Right upper extremity PICC remains coiled in the expected location  of the right subclavian vein with tip projecting over the central  right brachiocephalic vein.  3. Endotracheal tube terminates approximately 6 cm above the paola.  4. Otherwise stable post operative chest with multiple surgical  sponges projecting over the mediastinum      [Urgent Result: right picc malposition]    Finding was identified on 7/9/2022 4:29 AM.     LITA Rivera was contacted by Dr. Courtney at 7/9/2022 9:58 AM and  verbalized understanding of the urgent finding.     I have personally reviewed the examination and initial interpretation  and I agree with the findings.    GABRIEL COURTNEY MD         SYSTEM ID:  [id number]   XR Chest Port 1 View    Narrative    EXAM: XR CHEST PORT 1 VIEW, 7/9/2022 4:49 AM    INDICATION: bleeding    COMPARISON:  7/9/2022    FINDINGS  Technique: Supine AP view of the chest.     Devices: Endotracheal tube terminates over the midthoracic trachea  approximately 6 cm above the paola. Right subclavian approach  intra-aortic balloon pump superior marker projects over the aortic  arch, unchanged. Left chest 3-lead cardiac conduction device. LVAD  device projects over the lower left hemithorax. Mediastinal drains  project over the mediastinum. Right internal jugular ECMO cannula  terminates over the main pulmonary artery. Packing material markers  project over the superior mediastinum. External pacer leads project  over the left chest. Right PICC terminates over the mid SVC.  Left-sided coronary stent.    No new focal airspace opacity.  No discernible pneumothorax.  No  pleural effusion.  Unchanged cardiomegaly.      Impression    IMPRESSION:   No interval change.    I have personally reviewed the examination and initial interpretation  and I agree with the  findings.    GABRIEL COURTNEY MD         SYSTEM ID:  [id number]         6. LINES/DRAINS/AIRWAY    Peripheral IV 07/05/22 Left;Anterior Upper forearm (Active)   Site Assessment WD 07/09/22 1200   Line Status Saline locked 07/09/22 1200   Dressing Intervention New dressing  07/07/22 0400   Phlebitis Scale 0-->no symptoms 07/09/22 1200   Infiltration Scale 0 07/09/22 1200   If infiltrated, was a vesicant infusing? No 07/09/22 1200   Number of days: 4       PICC Triple Lumen 06/17/22 Right Brachial vein medial (Active)   Site Assessment WD 07/09/22 1200   Dressing Intervention Chlorhexidine patch;Transparent 07/09/22 1200   Dressing Change Due 07/10/22 07/09/22 1200   PICC Comment from OSH 06/17/22 1700   [place] - Status saline locked 07/09/22 1200   [place] - Cap Change Due 07/12/22 07/09/22 1200   Red - Status infusing 07/09/22 1200   Red - Cap Change Due 07/12/22 07/09/22 1200   White - Status infusing 07/09/22 1200   White - Cap Change Due 07/12/22 07/09/22 1200   Extravasation? No 07/09/22 1200   Line Necessity Yes, meets criteria 07/09/22 1200   Number of days: 22       Introducer 07/08/22 Femoral Left (Active)   Specific Qualities Capped 07/09/22 1200   (Retired) Dressing Status Clean, dry, intact 07/09/22 1200   Dressing Change Due 07/10/22 07/09/22 1200   Number of days: 1       Arterial Line 07/08/22 Femoral (Active)   Site Assessment WD 07/09/22 1200   Line Status Pulsatile blood flow 07/09/22 1200   Arterine Line Cap Change Due 07/12/22 07/09/22 1200   Art Line Waveform Appropriate 07/09/22 1200   Art Line Interventions Zeroed and calibrated;Leveled;Connections checked and tightened;Flushed per protocol 07/09/22 1200   Color/Movement/Sensation Capillary refill less than 3 sec 07/09/22 1200   Line Necessity Yes, meets criteria 07/09/22 1200   Dressing Type Transparent 07/09/22 1200   Dressing Status Clean, dry, intact 07/09/22 1200   Dressing Change Due 07/10/22 07/09/22 1200   Number of days: 1       Arterial Line  07/08/22 Radial (Active)   Site Assessment WDL 07/09/22 1200   Line Status Pulsatile blood flow 07/09/22 1200   Arterine Line Cap Change Due 07/12/22 07/09/22 1200   Art Line Waveform Appropriate 07/09/22 1200   Art Line Interventions Zeroed and calibrated;Leveled;Connections checked and tightened 07/09/22 1200   Color/Movement/Sensation Capillary refill less than 3 sec 07/09/22 1200   Line Necessity Yes, meets criteria 07/09/22 1200   Dressing Type Transparent 07/09/22 1200   Dressing Status Clean, dry, intact 07/09/22 1200   Dressing Change Due 07/10/22 07/09/22 1200   Number of days: 1       Venous Sheath 07/08/22 Other (comment) Right (Active)   Specific Qualities Sutured;Left in Place 07/09/22 1200   Site Assessment UTV 07/09/22 1200   Dressing Type Transparent 07/09/22 1200   Dressing Intervention Dressing changed/new dressing 07/08/22 1700   Venous Sheath Comment Protek Duo 07/09/22 1200   Number of days: 1       CVC Double Lumen Left Femoral (Active)   Site Assessment WDL 07/09/22 1200   Dressing Type Chlorhexidine disk;Transparent 07/09/22 1200   Dressing Status clean;dry;intact 07/09/22 1200   Dressing Change Due 07/10/22 07/09/22 1200   Line Necessity yes, meets criteria 07/09/22 1200   Brown - Status infusing 07/09/22 1200   Brown - Cap Change Due 07/12/22 07/09/22 1200   White - Status infusing 07/09/22 1200   White - Cap Change Due 07/12/22 07/09/22 1200   Phlebitis Scale 0-->no symptoms 07/09/22 1200   Infiltration? no 07/09/22 1200   Infiltration Scale 0 07/09/22 1200   CVC Comment MAC 07/09/22 0800   Number of days: 1       ETT Cuffed Single 8 mm (Active)   Secured at (cm) 23 cm 07/09/22 1200   Measured from Teeth/Gums 07/09/22 1200   Position Center 07/09/22 1200   Secured by Commercial tube partida 07/09/22 1200   Bite Block None Present 07/09/22 1200   Site Appearance Clean;Dry 07/09/22 1200   Tube Care Site care done 07/09/22 1200   Cuff Assessment Minimal leak technique 07/09/22 1200   Safety  Measures Manual resuscitator at bedside 07/09/22 1200   Number of days: 1       Chest Tube 1 Anterior Mediastinal 9 Sinhal[place] (Active)   Site Assessment WDL X;Bleeding 07/09/22 0800   Suction -20 cm H2O 07/09/22 0800   Chest Tube Airleak No 07/09/22 1100   Drainage Description Sanguinous 07/09/22 1100   Dressing Status Dressing Changed 07/09/22 0400   Dressing Change Due 07/10/22 07/09/22 0800   Dressing Intervention Gauze 07/09/22 0800   Patency Intervention Tip/Tilt 07/09/22 0800   Chest Tube Clamps at Bedside present 07/09/22 0800   Container Amount 1380 07/09/22 1100   Output (ml) 10 ml 07/09/22 1100   Number of days: 1       Chest Tube 2 Anterior Mediastinal 9 Sinhala (Active)   Site Assessment WDL X;Bleeding 07/09/22 0800   Suction -20 cm H2O 07/09/22 0800   Chest Tube Airleak No 07/09/22 1100   Drainage Description Sanguinous 07/09/22 1100   Dressing Status Dressing Changed 07/09/22 0400   Dressing Change Due 07/10/22 07/09/22 0800   Dressing Intervention Gauze 07/09/22 0800   Patency Intervention Stripped 07/09/22 0900   Chest Tube Clamps at Bedside present 07/09/22 0800   Number of days: 1       Chest Tube 3 Pleural 32 Sinhala (Active)   Site Assessment WDL X;Bleeding 07/09/22 0800   Suction -20 cm H2O 07/09/22 0800   Chest Tube Airleak No 07/09/22 1100   Drainage Description Sanguinous 07/09/22 1100   Dressing Status Dressing Changed 07/09/22 0400   Dressing Change Due 07/10/22 07/09/22 0800   Dressing Intervention Gauze 07/09/22 0800   Patency Intervention Stripped 07/09/22 0900   Chest Tube Clamps at Bedside present 07/09/22 0800   Container Amount 1350 07/09/22 1100   Output (ml) 50 ml 07/09/22 1100   Number of days: 1       Chest Tube 4 Posterior Pericardial 24 Sinhala (Active)   Site Assessment WDL X;Bleeding 07/09/22 0800   Suction -20 cm H2O 07/09/22 0800   Chest Tube Airleak No 07/09/22 1100   Drainage Description Sanguinous 07/09/22 1100   Dressing Status Dressing Changed 07/09/22 0400   Dressing Change  Due 07/10/22 07/09/22 0800   Dressing Intervention Gauze 07/09/22 0800   Patency Intervention Stripped 07/09/22 0900   Chest Tube Clamps at Bedside present 07/09/22 0800   Number of days: 1       Chest Tube 5 Anterior Mediastinal 28 [place] (Active)   Site Assessment WDL X;Bleeding 07/09/22 0800   Suction -20 cm H2O 07/09/22 0800   Chest Tube Airleak No 07/09/22 1100   Drainage Description Sanguinous 07/09/22 1100   Dressing Status Dressing Changed 07/09/22 0400   Dressing Change Due 07/10/22 07/09/22 0800   Dressing Intervention Gauze 07/09/22 0800   Patency Intervention Stripped 07/09/22 0900   Chest Tube Clamps at Bedside present 07/09/22 0800   Number of days: 1       NG/OG/NJ Tube Orogastric (Active)   Site Description WDL 07/09/22 1200   Status Suction-low intermittent 07/09/22 1200   Placement Confirmation X-ray 07/09/22 1200   [place] (cm marking) at nare/mouth 80 cm 07/09/22 1200   Intake (ml) 100 ml 07/09/22 0800   Flush/Free Water (mL) 30 mL 07/09/22 1200   Number of days: 1       Urethral Catheter 07/08/22 Coude;Latex;Temperature probe;Triple-lumen 16 fr (Active)   Tube Description Positional 07/09/22 1200   Catheter Care Done;Catheter wipes 07/09/22 0800   Collection Container Standard 07/09/22 1200   Securement Method Securing device (Describe) 07/09/22 1200   Rationale for Continued Use Strict 1-2 Hour I&O;Deep Sedation/Paralysis 07/09/22 1200   Urine Output 25 mL 07/09/22 1100   Number of days: 1       Incision/Surgical Site 06/23/22 Right Chest (Active)   Incision Assessment UTV 07/09/22 1200   Deirdre-Incision Assessment UTV 07/09/22 0800   Closure FABRICE 07/09/22 1200   Incision Drainage Amount UTV 07/09/22 1200   Drainage Description UTV 07/09/22 1200   Incision Care Wound cleanser 07/03/22 0400   Dressing Intervention Clean, dry, intact 07/09/22 1200   Number of days: 16       Incision/Surgical Site 07/08/22 Chest (Active)   Incision Assessment UTV 07/09/22 1200   Deirdre-Incision Assessment UTV 07/09/22  0800   Closure Other (Comment) 07/09/22 1200   Incision Drainage Amount Scant 07/09/22 1200   Drainage Description Sanguinous 07/09/22 0400   Incision Care Other (Comment) 07/09/22 0400   Dressing Intervention Clean, dry, intact 07/09/22 1200   Number of days: 1          ====================================================

## 2022-07-08 NOTE — H&P
CV ICU H&P  7/8/2022      CO-MORBIDITIES:   Patient Active Problem List   Diagnosis     Decompensated heart failure (H)     Cardiogenic shock (H)       ASSESSMENT: Dandy Sands is a 61 year old male with PMH of CAD s/p PCI to LAD, MI, ICM, acute on chronic heart failure, s/p CRT-D, severe MR, antiphospholipid antibody syndrome on chronic warfarin, SLE, HTN, HLD, recent hospitalization 5/16-5/26 s/p RCA, LAD stenting who underwent RVAD (29F Protek duo RIJ) LVAD placement (HeartMate 3) on 7/8/22 by Dr. Zamora.    Intraoperative: Suspected balloon pump dysfunction led to CPB, fluid resuscitation, and high-dose multi-agent (epi/NE/angiotensin) pressor requirement. Ultimately decision was made for RVAD and LVAD placement. Incomplete chest closure today, with plan to return on 7/11.     - EBL 1L / Crystalloid: 3-4L / Albumin 250 mL / Cell Saver: ~500 mL /  pRBC 3U /  mL / CPB time: 2 h 38 min    PLAN:  Neuro/ pain/ sedation:  Acute Postoperative pain  #Depression  #Anxiety due to a medical condition  #Insomnia  - Monitor neurological status. Notify the MD for any acute changes in exam.  - Neuro check on arrival: moving extremities x4, following commands  - Pain: fentanyl gtt. Scheduled tylenol. PRN tylenol, oxycodone, dilaudid.  - Sedation: propofol gtt   - PTA meds: duloxetine 30mg, hydroxyzine 25mg PRN, zolpidem 5mg, melatonin 3mg, lorazepam 0.5mg    Pulmonary care:   Postoperative ventilation management  #Mild-moderate emphysema  #History of COVID-19  - Intubated, ventilated, wean as able   - MV: RR 10 /  / PEEP 5 / FiO2 50%   - Titrate supplemental oxygen to maintain saturation above 92%.    Cardiovascular:    S/p RVAD (29F Protek duo RIJ) LVAD placement (HeartMate 3)  #CAD s/p PCI to LAD (2005)  #S/p CRT-D (2006)  #History of MI (2007)  #Advanced ischemic cardiomyopathy, class IV, stage D  #Severe MR  #Antiphospholipid antibody syndrome on chronic warfarin  #HTN  #HLD  #Recent hospitalization  5/16-5/26 s/p RCA, LAD stenting  Recent echo on 5/16/2022 with LVEF of 10-15%.  - Monitor hemodynamic status  - Goal MAP 65-85  - Pressors:    -Epi: 0.05,    -Norepi: 0.03   -Vaso 1   -NO 20  - Immunosuppression per surgery  - PTA meds: atorvastatin 40mg, clopidogrel 75mg, lasix 40mg, warfarin 5mg      GI care/ Nutrition:   #GERD   #Liver injury in the setting of cardiogenic shock  - NPO   - PPI  - Continue bowel regimen: miralax, senna  - PTA meds: famotidine 20mg    Renal/ Fluid Balance/ Electrolytes:   BL creat appears to be ~ 0.7  - Strict I/O, daily weights  - Avoid/limit nephrotoxins as able  - Replete lytes PRN per protocol  - PTA meds: tamsulosin 0.4mg     Endocrine:    Stress induced hyperglycemia  Preop A1c 5.5%  - Insulin gtt  - Goal BG <180 for optimal healing    #SLE:  Chronic, symptoms include arthritis, photosensitivity, fatigue, and skin manifestations  - PTA meds: hydroxychloroquine 200mg, held     ID/ Antibiotics:  Stress induced leukocytosis  - To complete perioperative regimen  - Continue to monitor fever curve, WBC and inflammatory markers as appropriate    Heme:     Stress induced leukocytosis  Acute blood loss anemia  Acute blood loss thrombocytopenia  #History of DVT and PE  #Antiphospholipid antibody syndrome  #Iron deficiency anemia  #Profuse epistaxis from L nare with bloody emesis, resolved  No s/sx active bleeding. Hgb 8 (1500)  - Continue to monitor  - CBC   - 2U pRBC ordered, pending repeat hgb   - giving one now    MSK/ Skin:  Sternotomy  Surgical Incision  - Sternal precautions  - Postoperative incision management per protocol  - PT/OT/CR    Prophylaxis:    - Mechanical prophylaxis for DVT  - Chemical DVT prophylaxis - start subcutaneous heparin tomorrow  - PPI     Lines/ tubes/ drains:  - Arterial Line, ETT, CTs, PA catheter, RIJ, marcelo, OG    Disposition:  - CVICU    Patient seen, findings and plan discussed with CVICU staff, Dr. Vance.      Stephanie Chadwick, MS4  [facility] CVICU    Patient was  seen and evaluated independently by me.     Rashard Stephenson MD  Anesthesiology, PGY3 / CA2  [facility] CVICU  7/8/2022    ====================================    HPI:     Per Cardiology note 7/8:    Dandy Sands is a 61 year old male with PMH of CAD s/p PCI to LAD, MI, ICM, acute on chronic heart failure, s/p CRT-D, severe MR, antiphospholipid antibody syndrome on chronic warfarin, SLE, HTN, HLD, recent hospitalization 5/16-5/26 s/p RCA, LAD stenting who underwent RVAD (29F Protek duo RIJ) LVAD placement (HeartMate 3) on 7/8/22 by Dr. Zamora.    He has had multiple admissions with heart failure over the past several months. Presented on 5/27 with cardiogenic shock complicated by multi-organ failure (JOSE; acute liver injury/shock liver) requiring mechanical hemodynamic support. Now listed as status 2 for OHT. Course complicated by large epistaxis 6/22 with 400 ml blood. Had subclavian IABP placed instead of femoral IABP 6/23/22, adjusted 7/3/22 due to coiling in aortic arch that was asymptomatic and without pulse changes in extremities. Optimized with plan for LVAD placement 7/8/22.       PAST MEDICAL HISTORY:   - CAD s/p PCI to LAD  - ICM  - S/p CRT-D  - Severe MR  - End stage heart failure, cardiogenic shock  - Antiphospholipid antibody syndrome on chronic warfarin  - HTN  - HLD  - Recent hospitalization 5/16-5/26 s/p RCA, LAD stenting      PAST SURGICAL HISTORY:   Past Surgical History:   Procedure Laterality Date     COLONOSCOPY N/A 05/23/2022    Procedure: COLONOSCOPY;  Surgeon: Parth Meneses MD;  Location: [facility]     CV CORONARY ANGIOGRAM N/A 5/24/2022    Procedure: Coronary Angiogram;  Surgeon: Mike Pope MD;  Location: [facility] HE[facility]     CV CORONARY ANGIOGRAM N/A 5/29/2022    Procedure: Coronary Angiogram;  Surgeon: Austin Bright MD;  [facility]     CV CORONARY LITHOTRIPSY PCI Bilateral 5/24/2022    Procedure: Percutaneous Coronary Intervention -  Lithotripsy;  Surgeon: Mike Pope MD;  [facility]     CV INTRA AORTIC BALLOON N/A 6/17/2022    Procedure: Intraprocedure Aortic Balloon Pump Insertion;  Surgeon: Sherman Cerda MD;  [facility]     CV INTRA AORTIC BALLOON Right 7/3/2022    Procedure: Reposition of Subclavian Intraprocedure Aortic Balloon Pump;  Surgeon: Austin Bright MD;  Location:  HEART CARDIAC CATH LAB     CV INTRAVASULAR ULTRASOUND N/A 5/24/2022    Procedure: Intravascular Ultrasound;  Surgeon: Mike Pope MD;  [facility]     CV PCI ANGIOPLASTY N/A 5/29/2022    Procedure: Percutaneous Transluminal Angioplasty;  Surgeon: Austin Bright MD;  Location:  HEART CARDIAC CATH LAB     CV PCI STENT DRUG ELUTING N/A 5/24/2022    Procedure: Percutaneous Coronary Intervention Stent;  Surgeon: Mike Pope MD;  [facility]     CV RIGHT HEART CATH MEASUREMENTS RECORDED N/A 05/18/2022    Procedure: Right Heart Catheterization;  Surgeon: Justo Stewart MD;  [facility]     CV RIGHT HEART CATH MEASUREMENTS RECORDED N/A 5/24/2022    Procedure: Right Heart Catheterization;  Surgeon: Mike Pope MD;  Location:  HEART CARDIAC CATH LAB     CV RIGHT HEART CATH MEASUREMENTS RECORDED N/A 5/29/2022    Procedure: Right Heart Catheterization;  Surgeon: Austin Bright MD;  [facility]     CV RIGHT HEART CATH MEASUREMENTS RECORDED N/A 7/1/2022    Procedure: Right Heart Catheterization;  Surgeon: Mike Pope MD;  Location:  HEART CARDIAC CATH LAB     CV RIGHT HEART EXERCISE STRESS STUDY N/A 05/18/2022    Procedure: Stress Drug Study;  Surgeon: Justo Stewart MD;  [facility]     CV SWAN CHOCO PROCEDURE N/A 6/17/2022    Procedure: Tippo Choco Procedure;  Surgeon: Sherman Cerda MD;  [facility]     INSERT INTRAAORTIC  "BALLOON PUMP Right 6/23/2022    Procedure: INSERTION, INTRA-AORTIC BALLOON PUMP RIGHT SUBCLAVIAN ARTERY.;  Surgeon: Hayden Veras MD;  Location: [facility] OR     PICC DOUBLE LUMEN PLACEMENT Right 05/28/2022    right basilic 5 fr dl picc 40 cm       FAMILY HISTORY: No family history on file.    SOCIAL HISTORY:   Social History     Tobacco Use     Smoking status: Not on file     Smokeless tobacco: Not on file   Substance Use Topics     Alcohol use: Not on file         OBJECTIVE:   1. VITAL SIGNS:      /73   Pulse 79   Temp 97.5  F (36.4  C) (Axillary)   Resp 17   Ht 1.702 m (5' 7\")   Wt 63 kg (138 lb 14.2 oz)   SpO2 96%   BMI 21.75 kg/m      2. INTAKE/ OUTPUT:      I/O last 3 completed shifts:  In: 1325.04 [P.O.:840; I.V.:485.04]  Out: 2025 [Urine:2025]    3. PHYSICAL EXAMINATION:     General: sedated  Neuro: sedated, post-operatively moving extremities x4, following commands   Resp: intubated, ventilated  CV: S1, S2, RRR, no m/r/g   Abdomen: Soft, non-distended  Incisions: c/d/i  Extremities: warm and well perfused  CT: To suction, serosang output, no airleak, crepitus    4. INVESTIGATIONS:   Arterial Blood Gases   Recent Labs   Lab 07/08/22  1030   PH 7.34*   PCO2 40   PO2 351*   HCO3 22     Complete Blood Count   Recent Labs   Lab 07/08/22  1030 07/08/22  0332 07/07/22  0356 07/06/22  0346 07/05/22  0404   WBC  --  6.2 6.2 6.6 5.9   HGB 10.4* 9.0* 9.6* 9.2* 8.8*   PLT  --  386 385 429 362     Basic Metabolic Panel  Recent Labs   Lab 07/08/22  1030 07/08/22  0340 07/08/22  0332 07/07/22  0356 07/06/22  0346 07/05/22  0404     --  138 135* 134* 130*   POTASSIUM 4.4  --  4.4 4.4 4.5 4.5   CHLORIDE  --   --  104 98 101 97*   CO2  --   --  23 23 22 21*   BUN  --   --  15.3 13.2 14.7 14.7   CR  --   --  0.66* 0.68 0.74 0.71   * 92 96 95 87 204*     Liver Function Tests  Recent Labs   Lab 07/08/22  0332 07/07/22  0356 07/06/22  0346 07/05/22  0404   AST 32 32 30 24   ALT 39 41 39 41   ALKPHOS 127 125 " 125 120   BILITOTAL 0.3 0.3 0.3 0.3   ALBUMIN 3.5 3.5 3.4* 3.2*   INR 1.21* 1.13 1.12 1.28*     Pancreatic Enzymes  No lab results found in last 7 days.  Coagulation Profile  Recent Labs   Lab 07/08/22  0332 07/07/22  0356 07/06/22  0346 07/05/22  0404   INR 1.21* 1.13 1.12 1.28*         5. RADIOLOGY:   Recent Results (from the past 24 hour(s))   XR Chest Port 1 View    Narrative    EXAM: XR CHEST PORT 1 VIEW  7/8/2022 5:45 AM     HISTORY:  IABP positioning       COMPARISON:  7/7/2022    FINDINGS  Technique: Semiupright AP view of the chest.     Devices: Right subclavian approach intra-aortic balloon pump superior  marker projects over the aortic arch, unchanged. Left chest cardiac  conduction device unchanged. Right PICC terminates over the mid SVC.  Left-sided coronary stent.    No new focal airspace opacity.  No discernible pneumothorax.  No  pleural effusion.     Cardiomediastinal silhouette is  stable in size.      Impression    IMPRESSION:     1. Unchanged position of intra-aortic balloon pump marker.  2. No new focal airspace opacity.    I have personally reviewed the examination and initial interpretation  and I agree with the findings.    LAI HERNADEZ MD         SYSTEM ID:  [id number]   Cardiac Device Check - Inpatient   Result Value    Date Time Interrogation Session [id number]    Implantable Pulse Generator  Medtronic    Implantable Pulse Generator Model [id number] Eliseo XT     Implantable Pulse Generator Serial Number [id number]    Type Interrogation Session In Clinic    Clinic Name [facility]    Implantable Pulse Generator Type Defibrillator    Implantable Pulse Generator Implant Date 20180412    Implantable Lead  Medtronic    Implantable Lead Model 5076 CapSureFix Novus    Implantable Lead Serial Number [id number]    Implantable Lead Implant Date 20060306    Implantable Lead Polarity Type Bipolar Lead    Implantable Lead Location Detail 1 UNKNOWN     Implantable Lead Location Right Atrium    Implantable Lead  Medtronic    Implantable Lead Model 6949 Fidel Sal    Implantable Lead Serial Number [id number]    Implantable Lead Implant Date 20060306    Implantable Lead Polarity Type Bipolar Lead    Implantable Lead Location Detail 1 UNKNOWN    Implantable Lead Location Right Ventricle    Marcos Setting Mode (NBG Code) MVP_AAI_DDD    Marcos Setting Lower Rate Limit 50    Marcos Setting Maximum Tracking Rate 130    Marcos Setting Maximum Sensor Rate 115    Marcos Setting Hysterisis Rate DISABLED    Marcos Setting TRISTEN Delay Low 350    Marcos Setting PAV Delay Low 350    Marcos Setting AT Mode Switch Rate 171    Lead Channel Setting Sensing Polarity Bipolar    Lead Channel Setting Sensing Anode Location Right Atrium    Lead Channel Setting Sensing Anode Terminal Ring    Lead Channel Setting Sensing Cathode Location Right Atrium    Lead Channel Setting Sensing Cathode Terminal Tip    Lead Channel Setting Sensing Sensitivity 0.3    Lead Channel Setting Sensing Polarity Bipolar    Lead Channel Setting Sensing Anode Location Right Ventricle    Lead Channel Setting Sensing Anode Terminal Ring    Lead Channel Setting Sensing Cathode Location Right Ventricle    Lead Channel Setting Sensing Cathode Terminal Tip    Lead Channel Setting Sensing Sensitivity 0.3    Lead Channel Setting Pacing Polarity Bipolar    Lead Channel Setting Pacing Anode Location Right Atrium    Lead Channel Setting Pacing Anode Terminal Ring    Lead Channel Setting Sensing Cathode Location Right Atrium    Lead Channel Setting Sensing Cathode Terminal Tip    Lead Channel Setting Pacing Pulse Width 0.4    Lead Channel Setting Pacing Amplitude 1.5    Lead Channel Setting Pacing Capture Mode Adaptive    Lead Channel Setting Pacing Polarity Bipolar    Lead Channel Setting Pacing Anode Location Right Ventricle    Lead Channel Setting Pacing Anode Terminal Ring    Lead Channel Setting Sensing Cathode  Location Right Ventricle    Lead Channel Setting Sensing Cathode Terminal Tip    Lead Channel Setting Pacing Pulse Width 0.4    Lead Channel Setting Pacing Amplitude 2    Lead Channel Setting Pacing Capture Mode Adaptive    Zone Setting Type Category VF    Zone Setting Detection Interval 320    Zone Setting Detection Beats Numerator 30    Zone Setting Detection Beats Denominator 40    Zone Setting Type Category VT    Zone Setting Detection Interval 280    Zone Setting Type Category VT    Zone Setting Detection Interval 350    Zone Setting Type Category VT    Zone Setting Detection Interval 400    Zone Setting Type Category ATRIAL_FIBRILLATION    Zone Setting Type Category AT/AF    Zone Setting Detection Interval 350    Lead Channel Impedance Value 342    Lead Channel Sensing Intrinsic Amplitude 1.3    Lead Channel Pacing Threshold Amplitude 0.5    Lead Channel Pacing Threshold Pulse Width 0.4    Lead Channel Impedance Value 418    Lead Channel Impedance Value 304    Lead Channel Sensing Intrinsic Amplitude 13    Lead Channel Pacing Threshold Amplitude 1.00    Lead Channel Pacing Threshold Pulse Width 0.4    Battery Date Time of Measurements 20220708073822    Battery Status OK    Battery RRT Trigger 2.727    Battery Remaining Longevity 63    Battery Voltage 2.98    Capacitor Charge Type Reformation    Capacitor Last Charge Date Time 32445875419675    Capacitor Charge Time 3.973    Capacitor Charge Energy 18    Marcos Statistic Date Time Start 20220623111502    Marcos Statistic Date Time End 20220708073636    Marcos Statistic RA Percent Paced 0.01    Marcos Statistic RV Percent Paced 0.04    Marcos Statistic AP  Percent 0    Marcos Statistic AS  Percent 0.03    Marcos Statistic AP VS Percent 0    Marcos Statistic AS VS Percent 99.96    Atrial Tachy Statistic Date Time Start 20220623111502    Atrial Tachy Statistic Date Time End 20220708073636    Atrial Tachy Statistic AT/AF Leisenring Percent 0    Therapy Statistic Recent  Shocks Delivered 0    Therapy Statistic Recent Shocks Aborted 0    Therapy Statistic Recent ATP Delivered 0    Therapy Statistic Recent Date Time Start 36788192698333    Therapy Statistic Recent Date Time End 56408822921004    Therapy Statistic Total Shocks Delivered 1    Therapy Statistic Total Shocks Aborted 0    Therapy Statistic Total ATP Delivered 0    Therapy Statistic Total  Date Time Start 46362257887258    Therapy Statistic Total  Date Time End 53225164861832    Episode Statistic Recent Count 0    Episode Statistic Type Category AT/AF    Episode Statistic Recent Count 0    Episode Statistic Type Category SVT    Episode Statistic Recent Count 0    Episode Statistic Type Category VT    Episode Statistic Recent Count 0    Episode Statistic Type Category VF    Episode Statistic Recent Count 0    Episode Statistic Type Category VT    Episode Statistic Recent Count 0    Episode Statistic Type Category VT    Episode Statistic Recent Count 0    Episode Statistic Type Category VT    Episode Statistic Recent Date Time Start 38026729159815    Episode Statistic Recent Date Time End 67815722155108    Episode Statistic Recent Date Time Start 85671817832627    Episode Statistic Recent Date Time End 48000336063116    Episode Statistic Recent Date Time Start 73945410040157    Episode Statistic Recent Date Time End 91438163283105    Episode Statistic Recent Date Time Start 59990951519272    Episode Statistic Recent Date Time End 72429256765894    Episode Statistic Recent Date Time Start 22551634845077    Episode Statistic Recent Date Time End 30721224275182    Episode Statistic Recent Date Time Start 42720256680163    Episode Statistic Recent Date Time End 24262996811314    Episode Statistic Recent Date Time Start 80592851270223    Episode Statistic Recent Date Time End 64562698644373    Episode Statistic Total Count 2    Episode Statistic Type Category AT/AF    Episode Statistic Total Count 0    Episode Statistic Type  Category SVT    Episode Statistic Total Count 9    Episode Statistic Type Category VT    Episode Statistic Total Count 1    Episode Statistic Type Category VF    Episode Statistic Total Count 0    Episode Statistic Type Category VT    Episode Statistic Total Count 0    Episode Statistic Type Category VT    Episode Statistic Total Count 1    Episode Statistic Type Category VT    Episode Statistic Total Date Time Start 04523582721248    Episode Statistic Total Date Time End 27280087053930    Episode Statistic Total Date Time Start 83128784767835    Episode Statistic Total Date Time End 20220708073636    Episode Statistic Total Date Time Start 20180412151624    Episode Statistic Total Date Time End 20220708073636    Episode Statistic Total Date Time Start 52279119059427    Episode Statistic Total Date Time End 20220708073636    Episode Statistic Total Date Time Start 20180412151624    Episode Statistic Total Date Time End 20220708073636    Episode Statistic Total Date Time Start 20180412151624    Episode Statistic Total Date Time End 20220708073636    Episode Statistic Total Date Time Start 65430152884114    Episode Statistic Total Date Time End 20220708073636    Narrative    Patient seen in [address] [place] [address] for evaluation and iterative programming of their ICD per MD orders PRE-OP LVAD implant.    Device: Medtronic [id number] Evera XT DR  Normal Device Function.  Mode: AAI<=>DDD  bpm  AP: <0.1%  : <0.1%  Intrinsic rhythm: SR @ 80 bpm  Short V-V intervals: 0  Thoracic Impedance: Near reference line.   Lead Trends Appear Stable: Yes  Estimated battery longevity to RRT = 5.2 years. Battery voltage = 2.98 V.  Atrial arrhythmia: Device records 4 AT/AF episodes each lasting < 1 min in duration for a total time in AT/AF = 14 seconds. No EGMs for further review.  AF burden: <0.1%  Anticoagulant: Heparin gtt  Ventricular Arrhythmia: None  Setting changes: Tachy therapies disabled for procedure.    Plan: Please page Device RN  post-op for reprogramming.  RICKIE Mcpherson RN    Dual lead ICD    I have reviewed and interpreted the device interrogation, settings, programming and nurse's summary. The device is functioning within normal device parameters. I agree with the current findings, assessment and plan.       =========================================

## 2022-07-08 NOTE — PROGRESS NOTES
Patient in OR for HM3 implant. Pump prepped by Meredith Long, VAD Coordinator and started by Daphne Villar, VAD Coordinator. VAD speed titrated up in collaboration with surgeon, anesthesia, and perfusion. Report was given to 4E RN upon arrival to the unit.       Parameters when leaving OR:  o Speed: 5200 rpm  o Flow: 208 lpm  o PI: 5.6  o Power: 3.6 persaud    Flow range at set speed: 3.2.8-3.8 lpm                                               PI range at set speed: 1.7-5.6    Factors noted to cause a significant variation in VAD parameters: hypotension, hypertension    Other significant events: changes in pump parameters (decreased PI, decreased flow) communicated to anesthesia and surgeon. Adjustments to fluids and drips made accordingly.     Please page the VAD Coordinator on-call with any VAD related questions (* * * [phone], job code [id number] from an internal line).     Chantal Brasher RN

## 2022-07-08 NOTE — ANESTHESIA PROCEDURE NOTES
Arterial Line Procedure Note    Pre-Procedure   Staff -        Anesthesiologist:  Justin Springer MD       Resident/Fellow: Joseph Fulton       Performed By: anesthesiologist and with residents       Procedure performed by resident/fellow/CRNA in presence of a teaching physician.         Location: OR       Pre-Anesthestic Checklist: patient identified, IV checked, risks and benefits discussed, informed consent, monitors and equipment checked, pre-op evaluation and at physician/surgeon's request  Timeout:       Correct Patient: Yes        Correct Procedure: Yes        Correct Site: Yes        Correct Position: Yes   Line Placement:   This line was placed Pre Induction starting at 7/8/2022 9:20 AM and ending at 7/8/2022 9:30 AM  Procedure   Procedure: arterial line       Laterality: left       Insertion Site: radial.  Sterile Prep        Standard elements of sterile barrier followed       Skin prep: Chloraprep  Insertion/Injection        Technique: ultrasound guided        1. Ultrasound was used to evaluate the access site.       2. Artery evaluated via ultrasound for patency/adequacy.       3. Using real-time ultrasound the needle/catheter was observed entering the artery/vein.       Catheter Type/Size: 20 G, 12 cm  Narrative        Tegaderm dressing used.       Complications: None apparent,        Arterial waveform: Yes        IBP within 10% of NIBP: Yes

## 2022-07-08 NOTE — ANESTHESIA CARE TRANSFER NOTE
Patient: Dandy Sands    Procedure: Procedure(s):  MEDIAN STERNOTOMY.  CARDIOPULMONARY BYPASS.  INTRAOPERATIVE TRANSESOPHAGEAL ECHOCARDIOGRAM.  IMPLANT LEFT VENTRICULAR ASSIST DEVICE HEARTMATE III.  PLACEMENT OF PERCUTANEOUS RIGHT VENTRICULAR ASSIST DEVICE.  TEMPORARY CHEST CLOSURE       Diagnosis: Cardiogenic shock (H) [R57.0]  Diagnosis Additional Information: No value filed.    Anesthesia Type:   General     Note:    Oropharynx: endotracheal tube in place and ventilatory support  Level of Consciousness: iatrogenic sedation  Patient oxygen source: ambu bag managed by RT with nitric oxide @ 20ppm.  Level of Supplemental Oxygen (L/min / FiO2): 12  Independent Airway: airway patency not satisfactory and stable  Dentition: dentition unchanged  Vital Signs Stable: post-procedure vital signs reviewed and stable  Report to RN Given: handoff report given  Patient transferred to: ICU  Comments: Patient transported with ECMO team, RT, resident anesthesiologist, and attending anesthesiologist. Patient was stable on arrival to the ICU. Given 100 mcg of fentanyl for pain control and was reversed with 200 mcg sugammadex. Signout was given to the ICU attending, CT surgery staff, resident physicians, and  nursing staff. All questions answered.    ICU Handoff: Call for PAUSE to initiate/utilize ICU HANDOFF, Identified Patient, Identified Responsible Provider, Reviewed the Pertinent Medical History, Discussed Surgical Course, Reviewed Intra-OP Anesthesia Management and Issues during Anesthesia, Set Expectations for Post Procedure Period and Allowed Opportunity for Questions and Acknowledgement of Understanding      Vitals:  Vitals Value Taken Time   BP 77/68 07/08/22 1657   Temp     Pulse 80 07/08/22 1657   Resp 16 07/08/22 1657   SpO2 100 % 07/08/22 1657   Vitals shown include unvalidated device data.    Electronically Signed By: Joseph Fulton  July 8, 2022  4:58 PM

## 2022-07-09 ENCOUNTER — APPOINTMENT (OUTPATIENT)
Dept: GENERAL RADIOLOGY | Facility: CLINIC | Age: 62
DRG: 001 | End: 2022-07-09
Attending: STUDENT IN AN ORGANIZED HEALTH CARE EDUCATION/TRAINING PROGRAM
Payer: COMMERCIAL

## 2022-07-09 ENCOUNTER — ANESTHESIA EVENT (OUTPATIENT)
Dept: SURGERY | Facility: CLINIC | Age: 62
DRG: 001 | End: 2022-07-09
Payer: COMMERCIAL

## 2022-07-09 LAB
ACT BLD: 126 SECONDS (ref 74–150)
ACT BLD: 126 SECONDS (ref 74–150)
ACT BLD: 130 SECONDS (ref 74–150)
ACT BLD: 131 SECONDS (ref 74–150)
ACT BLD: 135 SECONDS (ref 74–150)
ACT BLD: 135 SECONDS (ref 74–150)
ACT BLD: 143 SECONDS (ref 74–150)
ACT BLD: 143 SECONDS (ref 74–150)
ALBUMIN SERPL BCG-MCNC: 2.9 G/DL (ref 3.5–5.2)
ALT SERPL W P-5'-P-CCNC: 29 U/L (ref 10–50)
ANION GAP SERPL CALCULATED.3IONS-SCNC: 10 MMOL/L (ref 7–15)
ANION GAP SERPL CALCULATED.3IONS-SCNC: 8 MMOL/L (ref 7–15)
ANION GAP SERPL CALCULATED.3IONS-SCNC: 9 MMOL/L (ref 7–15)
ANION GAP SERPL CALCULATED.3IONS-SCNC: 9 MMOL/L (ref 7–15)
APTT PPP: 43 SECONDS (ref 22–38)
APTT PPP: 44 SECONDS (ref 22–38)
APTT PPP: 45 SECONDS (ref 22–38)
APTT PPP: 45 SECONDS (ref 22–38)
AT III ACT/NOR PPP CHRO: 72 % (ref 85–135)
BASE EXCESS BLDA CALC-SCNC: -0.5 MMOL/L (ref -9–1.8)
BASE EXCESS BLDA CALC-SCNC: -0.8 MMOL/L (ref -9–1.8)
BASE EXCESS BLDA CALC-SCNC: -0.8 MMOL/L (ref -9–1.8)
BASE EXCESS BLDA CALC-SCNC: -0.9 MMOL/L (ref -9–1.8)
BASE EXCESS BLDA CALC-SCNC: -1 MMOL/L (ref -9–1.8)
BASE EXCESS BLDA CALC-SCNC: -1 MMOL/L (ref -9–1.8)
BASE EXCESS BLDA CALC-SCNC: -1.2 MMOL/L (ref -9–1.8)
BASE EXCESS BLDA CALC-SCNC: 0.2 MMOL/L (ref -9–1.8)
BASE EXCESS BLDA CALC-SCNC: 0.7 MMOL/L (ref -9–1.8)
BASE EXCESS BLDA CALC-SCNC: 0.7 MMOL/L (ref -9–1.8)
BASE EXCESS BLDA CALC-SCNC: 0.8 MMOL/L (ref -9–1.8)
BASE EXCESS BLDA CALC-SCNC: 1.1 MMOL/L (ref -9–1.8)
BASE EXCESS BLDA CALC-SCNC: 1.3 MMOL/L (ref -9–1.8)
BASE EXCESS BLDV CALC-SCNC: 0.2 MMOL/L (ref -7.7–1.9)
BASOPHILS # BLD AUTO: 0 10E3/UL (ref 0–0.2)
BASOPHILS # BLD AUTO: 0 10E3/UL (ref 0–0.2)
BASOPHILS # BLD AUTO: 0.1 10E3/UL (ref 0–0.2)
BASOPHILS # BLD AUTO: 0.1 10E3/UL (ref 0–0.2)
BASOPHILS NFR BLD AUTO: 0 %
BASOPHILS NFR BLD AUTO: 1 %
BLD PROD TYP BPU: NORMAL
BLOOD COMPONENT TYPE: NORMAL
BUN SERPL-MCNC: 14.5 MG/DL (ref 8–23)
BUN SERPL-MCNC: 14.6 MG/DL (ref 8–23)
BUN SERPL-MCNC: 15.4 MG/DL (ref 8–23)
BUN SERPL-MCNC: 17.1 MG/DL (ref 8–23)
CA-I BLD-MCNC: 4.5 MG/DL (ref 4.4–5.2)
CA-I BLD-MCNC: 4.6 MG/DL (ref 4.4–5.2)
CA-I BLD-MCNC: 4.7 MG/DL (ref 4.4–5.2)
CA-I BLD-MCNC: 4.7 MG/DL (ref 4.4–5.2)
CALCIUM SERPL-MCNC: 8.4 MG/DL (ref 8.8–10.2)
CALCIUM SERPL-MCNC: 8.5 MG/DL (ref 8.8–10.2)
CHLORIDE SERPL-SCNC: 105 MMOL/L (ref 98–107)
CODING SYSTEM: NORMAL
CREAT SERPL-MCNC: 0.63 MG/DL (ref 0.67–1.17)
CREAT SERPL-MCNC: 0.64 MG/DL (ref 0.67–1.17)
CREAT SERPL-MCNC: 0.71 MG/DL (ref 0.67–1.17)
CREAT SERPL-MCNC: 0.77 MG/DL (ref 0.67–1.17)
CROSSMATCH: NORMAL
D DIMER PPP FEU-MCNC: 2.76 UG/ML FEU (ref 0–0.5)
D DIMER PPP FEU-MCNC: 3.13 UG/ML FEU (ref 0–0.5)
DEPRECATED HCO3 PLAS-SCNC: 22 MMOL/L (ref 22–29)
DEPRECATED HCO3 PLAS-SCNC: 22 MMOL/L (ref 22–29)
DEPRECATED HCO3 PLAS-SCNC: 23 MMOL/L (ref 22–29)
DEPRECATED HCO3 PLAS-SCNC: 23 MMOL/L (ref 22–29)
EOSINOPHIL # BLD AUTO: 0 10E3/UL (ref 0–0.7)
EOSINOPHIL # BLD AUTO: 0.1 10E3/UL (ref 0–0.7)
EOSINOPHIL # BLD AUTO: 0.2 10E3/UL (ref 0–0.7)
EOSINOPHIL # BLD AUTO: 0.3 10E3/UL (ref 0–0.7)
EOSINOPHIL NFR BLD AUTO: 0 %
EOSINOPHIL NFR BLD AUTO: 2 %
EOSINOPHIL NFR BLD AUTO: 3 %
EOSINOPHIL NFR BLD AUTO: 4 %
ERYTHROCYTE [DISTWIDTH] IN BLOOD BY AUTOMATED COUNT: 16.1 % (ref 10–15)
ERYTHROCYTE [DISTWIDTH] IN BLOOD BY AUTOMATED COUNT: 16.4 % (ref 10–15)
ERYTHROCYTE [DISTWIDTH] IN BLOOD BY AUTOMATED COUNT: 16.7 % (ref 10–15)
ERYTHROCYTE [DISTWIDTH] IN BLOOD BY AUTOMATED COUNT: 17.2 % (ref 10–15)
FIBRINOGEN PPP-MCNC: 269 MG/DL (ref 170–490)
FIBRINOGEN PPP-MCNC: 286 MG/DL (ref 170–490)
GFR SERPL CREATININE-BSD FRML MDRD: >90 ML/MIN/1.73M2
GLUCOSE BLDC GLUCOMTR-MCNC: 103 MG/DL (ref 70–99)
GLUCOSE BLDC GLUCOMTR-MCNC: 109 MG/DL (ref 70–99)
GLUCOSE BLDC GLUCOMTR-MCNC: 111 MG/DL (ref 70–99)
GLUCOSE BLDC GLUCOMTR-MCNC: 113 MG/DL (ref 70–99)
GLUCOSE BLDC GLUCOMTR-MCNC: 114 MG/DL (ref 70–99)
GLUCOSE BLDC GLUCOMTR-MCNC: 118 MG/DL (ref 70–99)
GLUCOSE BLDC GLUCOMTR-MCNC: 118 MG/DL (ref 70–99)
GLUCOSE BLDC GLUCOMTR-MCNC: 119 MG/DL (ref 70–99)
GLUCOSE BLDC GLUCOMTR-MCNC: 119 MG/DL (ref 70–99)
GLUCOSE BLDC GLUCOMTR-MCNC: 127 MG/DL (ref 70–99)
GLUCOSE BLDC GLUCOMTR-MCNC: 130 MG/DL (ref 70–99)
GLUCOSE BLDC GLUCOMTR-MCNC: 161 MG/DL (ref 70–99)
GLUCOSE BLDC GLUCOMTR-MCNC: 88 MG/DL (ref 70–99)
GLUCOSE SERPL-MCNC: 113 MG/DL (ref 70–99)
GLUCOSE SERPL-MCNC: 114 MG/DL (ref 70–99)
GLUCOSE SERPL-MCNC: 123 MG/DL (ref 70–99)
GLUCOSE SERPL-MCNC: 156 MG/DL (ref 70–99)
HCO3 BLD-SCNC: 24 MMOL/L (ref 21–28)
HCO3 BLD-SCNC: 25 MMOL/L (ref 21–28)
HCO3 BLD-SCNC: 26 MMOL/L (ref 21–28)
HCO3 BLDA-SCNC: 25 MMOL/L (ref 21–28)
HCO3 BLDV-SCNC: 26 MMOL/L (ref 21–28)
HCT VFR BLD AUTO: 27 % (ref 40–53)
HCT VFR BLD AUTO: 27.4 % (ref 40–53)
HCT VFR BLD AUTO: 27.7 % (ref 40–53)
HCT VFR BLD AUTO: 28.1 % (ref 40–53)
HGB BLD-MCNC: 9.2 G/DL (ref 13.3–17.7)
HGB BLD-MCNC: 9.3 G/DL (ref 13.3–17.7)
HGB BLD-MCNC: 9.5 G/DL (ref 13.3–17.7)
HGB BLD-MCNC: 9.6 G/DL (ref 13.3–17.7)
HGB FREE PLAS-MCNC: 40 MG/DL
IMM GRANULOCYTES # BLD: 0 10E3/UL
IMM GRANULOCYTES NFR BLD: 1 %
INR PPP: 1.22 (ref 0.85–1.15)
INR PPP: 1.25 (ref 0.85–1.15)
INR PPP: 1.27 (ref 0.85–1.15)
INR PPP: 1.32 (ref 0.85–1.15)
ISSUE DATE AND TIME: NORMAL
LACTATE SERPL-SCNC: 0.9 MMOL/L (ref 0.7–2)
LACTATE SERPL-SCNC: 1 MMOL/L (ref 0.7–2)
LACTATE SERPL-SCNC: 1.1 MMOL/L (ref 0.7–2)
LACTATE SERPL-SCNC: 1.1 MMOL/L (ref 0.7–2)
LACTATE SERPL-SCNC: 1.2 MMOL/L (ref 0.7–2)
LACTATE SERPL-SCNC: 1.3 MMOL/L (ref 0.7–2)
LACTATE SERPL-SCNC: 1.6 MMOL/L (ref 0.7–2)
LYMPHOCYTES # BLD AUTO: 0.3 10E3/UL (ref 0.8–5.3)
LYMPHOCYTES # BLD AUTO: 0.6 10E3/UL (ref 0.8–5.3)
LYMPHOCYTES NFR BLD AUTO: 4 %
LYMPHOCYTES NFR BLD AUTO: 5 %
LYMPHOCYTES NFR BLD AUTO: 6 %
LYMPHOCYTES NFR BLD AUTO: 9 %
MAGNESIUM SERPL-MCNC: 2 MG/DL (ref 1.7–2.3)
MCH RBC QN AUTO: 29 PG (ref 26.5–33)
MCH RBC QN AUTO: 29 PG (ref 26.5–33)
MCH RBC QN AUTO: 29.7 PG (ref 26.5–33)
MCH RBC QN AUTO: 29.7 PG (ref 26.5–33)
MCHC RBC AUTO-ENTMCNC: 33.9 G/DL (ref 31.5–36.5)
MCHC RBC AUTO-ENTMCNC: 34.1 G/DL (ref 31.5–36.5)
MCHC RBC AUTO-ENTMCNC: 34.2 G/DL (ref 31.5–36.5)
MCHC RBC AUTO-ENTMCNC: 34.3 G/DL (ref 31.5–36.5)
MCV RBC AUTO: 85 FL (ref 78–100)
MCV RBC AUTO: 85 FL (ref 78–100)
MCV RBC AUTO: 87 FL (ref 78–100)
MCV RBC AUTO: 87 FL (ref 78–100)
MONOCYTES # BLD AUTO: 0.3 10E3/UL (ref 0–1.3)
MONOCYTES # BLD AUTO: 0.4 10E3/UL (ref 0–1.3)
MONOCYTES # BLD AUTO: 0.6 10E3/UL (ref 0–1.3)
MONOCYTES # BLD AUTO: 0.6 10E3/UL (ref 0–1.3)
MONOCYTES NFR BLD AUTO: 6 %
MONOCYTES NFR BLD AUTO: 7 %
MONOCYTES NFR BLD AUTO: 8 %
MONOCYTES NFR BLD AUTO: 9 %
NEUTROPHILS # BLD AUTO: 4.5 10E3/UL (ref 1.6–8.3)
NEUTROPHILS # BLD AUTO: 5.1 10E3/UL (ref 1.6–8.3)
NEUTROPHILS # BLD AUTO: 5.3 10E3/UL (ref 1.6–8.3)
NEUTROPHILS # BLD AUTO: 6.2 10E3/UL (ref 1.6–8.3)
NEUTROPHILS NFR BLD AUTO: 81 %
NEUTROPHILS NFR BLD AUTO: 81 %
NEUTROPHILS NFR BLD AUTO: 84 %
NEUTROPHILS NFR BLD AUTO: 84 %
NRBC # BLD AUTO: 0 10E3/UL
NRBC BLD AUTO-RTO: 0 /100
O2/TOTAL GAS SETTING VFR VENT: 40 %
O2/TOTAL GAS SETTING VFR VENT: 60 %
OXYHGB MFR BLD: 97 % (ref 92–100)
OXYHGB MFR BLD: 97 % (ref 92–100)
OXYHGB MFR BLD: 98 % (ref 92–100)
OXYHGB MFR BLDA: 97 % (ref 75–100)
OXYHGB MFR BLDV: 59 %
PCO2 BLD: 34 MM HG (ref 35–45)
PCO2 BLD: 34 MM HG (ref 35–45)
PCO2 BLD: 35 MM HG (ref 35–45)
PCO2 BLD: 37 MM HG (ref 35–45)
PCO2 BLD: 38 MM HG (ref 35–45)
PCO2 BLD: 40 MM HG (ref 35–45)
PCO2 BLD: 41 MM HG (ref 35–45)
PCO2 BLD: 42 MM HG (ref 35–45)
PCO2 BLD: 46 MM HG (ref 35–45)
PCO2 BLD: 46 MM HG (ref 35–45)
PCO2 BLDA: 47 MM HG (ref 35–45)
PCO2 BLDV: 48 MM HG (ref 40–50)
PH BLD: 7.34 [PH] (ref 7.35–7.45)
PH BLD: 7.34 [PH] (ref 7.35–7.45)
PH BLD: 7.37 [PH] (ref 7.35–7.45)
PH BLD: 7.39 [PH] (ref 7.35–7.45)
PH BLD: 7.4 [PH] (ref 7.35–7.45)
PH BLD: 7.43 [PH] (ref 7.35–7.45)
PH BLD: 7.44 [PH] (ref 7.35–7.45)
PH BLD: 7.45 [PH] (ref 7.35–7.45)
PH BLD: 7.46 [PH] (ref 7.35–7.45)
PH BLD: 7.47 [PH] (ref 7.35–7.45)
PH BLDA: 7.34 [PH] (ref 7.35–7.45)
PH BLDV: 7.35 [PH] (ref 7.32–7.43)
PHOSPHATE SERPL-MCNC: 4.9 MG/DL (ref 2.5–4.5)
PLATELET # BLD AUTO: 117 10E3/UL (ref 150–450)
PLATELET # BLD AUTO: 85 10E3/UL (ref 150–450)
PLATELET # BLD AUTO: 87 10E3/UL (ref 150–450)
PLATELET # BLD AUTO: 96 10E3/UL (ref 150–450)
PO2 BLD: 107 MM HG (ref 80–105)
PO2 BLD: 154 MM HG (ref 80–105)
PO2 BLD: 161 MM HG (ref 80–105)
PO2 BLD: 170 MM HG (ref 80–105)
PO2 BLD: 170 MM HG (ref 80–105)
PO2 BLD: 174 MM HG (ref 80–105)
PO2 BLD: 175 MM HG (ref 80–105)
PO2 BLD: 177 MM HG (ref 80–105)
PO2 BLD: 177 MM HG (ref 80–105)
PO2 BLD: 178 MM HG (ref 80–105)
PO2 BLD: 182 MM HG (ref 80–105)
PO2 BLD: 185 MM HG (ref 80–105)
PO2 BLDA: 168 MM HG (ref 80–105)
PO2 BLDV: 31 MM HG (ref 25–47)
POTASSIUM BLD-SCNC: 5.2 MMOL/L (ref 3.4–5.3)
POTASSIUM SERPL-SCNC: 4.1 MMOL/L (ref 3.4–5.3)
POTASSIUM SERPL-SCNC: 4.2 MMOL/L (ref 3.4–5.3)
POTASSIUM SERPL-SCNC: 4.8 MMOL/L (ref 3.4–5.3)
POTASSIUM SERPL-SCNC: 5.6 MMOL/L (ref 3.4–5.3)
RADIOLOGIST FLAGS: ABNORMAL
RBC # BLD AUTO: 3.17 10E6/UL (ref 4.4–5.9)
RBC # BLD AUTO: 3.2 10E6/UL (ref 4.4–5.9)
RBC # BLD AUTO: 3.21 10E6/UL (ref 4.4–5.9)
RBC # BLD AUTO: 3.23 10E6/UL (ref 4.4–5.9)
SODIUM SERPL-SCNC: 136 MMOL/L (ref 136–145)
SODIUM SERPL-SCNC: 136 MMOL/L (ref 136–145)
SODIUM SERPL-SCNC: 137 MMOL/L (ref 136–145)
SODIUM SERPL-SCNC: 137 MMOL/L (ref 136–145)
UFH PPP CHRO-ACNC: <0.1 IU/ML
UNIT ABO/RH: NORMAL
UNIT NUMBER: NORMAL
UNIT STATUS: NORMAL
UNIT TYPE ISBT: 5100
UNIT TYPE ISBT: 6200
WBC # BLD AUTO: 5.3 10E3/UL (ref 4–11)
WBC # BLD AUTO: 6.1 10E3/UL (ref 4–11)
WBC # BLD AUTO: 6.5 10E3/UL (ref 4–11)
WBC # BLD AUTO: 7.4 10E3/UL (ref 4–11)

## 2022-07-09 PROCEDURE — 94799 UNLISTED PULMONARY SVC/PX: CPT

## 2022-07-09 PROCEDURE — 87040 BLOOD CULTURE FOR BACTERIA: CPT | Performed by: STUDENT IN AN ORGANIZED HEALTH CARE EDUCATION/TRAINING PROGRAM

## 2022-07-09 PROCEDURE — 85730 THROMBOPLASTIN TIME PARTIAL: CPT | Performed by: STUDENT IN AN ORGANIZED HEALTH CARE EDUCATION/TRAINING PROGRAM

## 2022-07-09 PROCEDURE — 200N000002 HC R&B ICU UMMC

## 2022-07-09 PROCEDURE — 71045 X-RAY EXAM CHEST 1 VIEW: CPT | Mod: 77

## 2022-07-09 PROCEDURE — 85347 COAGULATION TIME ACTIVATED: CPT

## 2022-07-09 PROCEDURE — 85300 ANTITHROMBIN III ACTIVITY: CPT | Performed by: STUDENT IN AN ORGANIZED HEALTH CARE EDUCATION/TRAINING PROGRAM

## 2022-07-09 PROCEDURE — 85610 PROTHROMBIN TIME: CPT | Performed by: STUDENT IN AN ORGANIZED HEALTH CARE EDUCATION/TRAINING PROGRAM

## 2022-07-09 PROCEDURE — 85520 HEPARIN ASSAY: CPT | Performed by: STUDENT IN AN ORGANIZED HEALTH CARE EDUCATION/TRAINING PROGRAM

## 2022-07-09 PROCEDURE — 250N000011 HC RX IP 250 OP 636: Performed by: STUDENT IN AN ORGANIZED HEALTH CARE EDUCATION/TRAINING PROGRAM

## 2022-07-09 PROCEDURE — 84132 ASSAY OF SERUM POTASSIUM: CPT | Performed by: STUDENT IN AN ORGANIZED HEALTH CARE EDUCATION/TRAINING PROGRAM

## 2022-07-09 PROCEDURE — 36415 COLL VENOUS BLD VENIPUNCTURE: CPT | Performed by: STUDENT IN AN ORGANIZED HEALTH CARE EDUCATION/TRAINING PROGRAM

## 2022-07-09 PROCEDURE — 85379 FIBRIN DEGRADATION QUANT: CPT | Performed by: STUDENT IN AN ORGANIZED HEALTH CARE EDUCATION/TRAINING PROGRAM

## 2022-07-09 PROCEDURE — 250N000009 HC RX 250: Performed by: STUDENT IN AN ORGANIZED HEALTH CARE EDUCATION/TRAINING PROGRAM

## 2022-07-09 PROCEDURE — 80069 RENAL FUNCTION PANEL: CPT | Performed by: STUDENT IN AN ORGANIZED HEALTH CARE EDUCATION/TRAINING PROGRAM

## 2022-07-09 PROCEDURE — 258N000003 HC RX IP 258 OP 636: Performed by: STUDENT IN AN ORGANIZED HEALTH CARE EDUCATION/TRAINING PROGRAM

## 2022-07-09 PROCEDURE — 93010 ELECTROCARDIOGRAM REPORT: CPT | Mod: 59 | Performed by: INTERNAL MEDICINE

## 2022-07-09 PROCEDURE — 94003 VENT MGMT INPAT SUBQ DAY: CPT

## 2022-07-09 PROCEDURE — 82803 BLOOD GASES ANY COMBINATION: CPT | Performed by: STUDENT IN AN ORGANIZED HEALTH CARE EDUCATION/TRAINING PROGRAM

## 2022-07-09 PROCEDURE — 84460 ALANINE AMINO (ALT) (SGPT): CPT | Performed by: STUDENT IN AN ORGANIZED HEALTH CARE EDUCATION/TRAINING PROGRAM

## 2022-07-09 PROCEDURE — 82805 BLOOD GASES W/O2 SATURATION: CPT | Performed by: STUDENT IN AN ORGANIZED HEALTH CARE EDUCATION/TRAINING PROGRAM

## 2022-07-09 PROCEDURE — 33948 ECMO/ECLS DAILY MGMT-VENOUS: CPT

## 2022-07-09 PROCEDURE — 999N000157 HC STATISTIC RCP TIME EA 10 MIN

## 2022-07-09 PROCEDURE — P9059 PLASMA, FRZ BETWEEN 8-24HOUR: HCPCS | Performed by: STUDENT IN AN ORGANIZED HEALTH CARE EDUCATION/TRAINING PROGRAM

## 2022-07-09 PROCEDURE — 71045 X-RAY EXAM CHEST 1 VIEW: CPT | Mod: 26 | Performed by: RADIOLOGY

## 2022-07-09 PROCEDURE — 999N000015 HC STATISTIC ARTERIAL MONITORING DAILY

## 2022-07-09 PROCEDURE — 82330 ASSAY OF CALCIUM: CPT | Performed by: STUDENT IN AN ORGANIZED HEALTH CARE EDUCATION/TRAINING PROGRAM

## 2022-07-09 PROCEDURE — 99291 CRITICAL CARE FIRST HOUR: CPT | Mod: 24 | Performed by: STUDENT IN AN ORGANIZED HEALTH CARE EDUCATION/TRAINING PROGRAM

## 2022-07-09 PROCEDURE — 83051 HEMOGLOBIN PLASMA: CPT | Performed by: STUDENT IN AN ORGANIZED HEALTH CARE EDUCATION/TRAINING PROGRAM

## 2022-07-09 PROCEDURE — P9041 ALBUMIN (HUMAN),5%, 50ML: HCPCS | Performed by: STUDENT IN AN ORGANIZED HEALTH CARE EDUCATION/TRAINING PROGRAM

## 2022-07-09 PROCEDURE — 30283B1 TRANSFUSION OF NONAUTOLOGOUS 4-FACTOR PROTHROMBIN COMPLEX CONCENTRATE INTO VEIN, PERCUTANEOUS APPROACH: ICD-10-PCS | Performed by: STUDENT IN AN ORGANIZED HEALTH CARE EDUCATION/TRAINING PROGRAM

## 2022-07-09 PROCEDURE — 93005 ELECTROCARDIOGRAM TRACING: CPT

## 2022-07-09 PROCEDURE — 85384 FIBRINOGEN ACTIVITY: CPT | Performed by: STUDENT IN AN ORGANIZED HEALTH CARE EDUCATION/TRAINING PROGRAM

## 2022-07-09 PROCEDURE — 250N000013 HC RX MED GY IP 250 OP 250 PS 637: Performed by: STUDENT IN AN ORGANIZED HEALTH CARE EDUCATION/TRAINING PROGRAM

## 2022-07-09 PROCEDURE — 71045 X-RAY EXAM CHEST 1 VIEW: CPT

## 2022-07-09 PROCEDURE — 83605 ASSAY OF LACTIC ACID: CPT | Performed by: STUDENT IN AN ORGANIZED HEALTH CARE EDUCATION/TRAINING PROGRAM

## 2022-07-09 PROCEDURE — 82310 ASSAY OF CALCIUM: CPT | Performed by: STUDENT IN AN ORGANIZED HEALTH CARE EDUCATION/TRAINING PROGRAM

## 2022-07-09 PROCEDURE — 85025 COMPLETE CBC W/AUTO DIFF WBC: CPT | Performed by: STUDENT IN AN ORGANIZED HEALTH CARE EDUCATION/TRAINING PROGRAM

## 2022-07-09 PROCEDURE — P9016 RBC LEUKOCYTES REDUCED: HCPCS | Performed by: STUDENT IN AN ORGANIZED HEALTH CARE EDUCATION/TRAINING PROGRAM

## 2022-07-09 PROCEDURE — 99233 SBSQ HOSP IP/OBS HIGH 50: CPT | Mod: GC | Performed by: INTERNAL MEDICINE

## 2022-07-09 PROCEDURE — 83735 ASSAY OF MAGNESIUM: CPT | Performed by: STUDENT IN AN ORGANIZED HEALTH CARE EDUCATION/TRAINING PROGRAM

## 2022-07-09 RX ADMIN — ALBUMIN HUMAN 250 ML: 0.05 INJECTION, SOLUTION INTRAVENOUS at 20:45

## 2022-07-09 RX ADMIN — MUPIROCIN 1 G: 20 OINTMENT TOPICAL at 20:08

## 2022-07-09 RX ADMIN — MUPIROCIN 1 G: 20 OINTMENT TOPICAL at 08:14

## 2022-07-09 RX ADMIN — Medication 40 MG: at 08:14

## 2022-07-09 RX ADMIN — VANCOMYCIN HYDROCHLORIDE 1000 MG: 1 INJECTION, SOLUTION INTRAVENOUS at 09:18

## 2022-07-09 RX ADMIN — ACETAMINOPHEN 975 MG: 325 TABLET, FILM COATED ORAL at 23:01

## 2022-07-09 RX ADMIN — CEFEPIME HYDROCHLORIDE 2 G: 2 INJECTION, POWDER, FOR SOLUTION INTRAVENOUS at 09:17

## 2022-07-09 RX ADMIN — ACETAMINOPHEN 975 MG: 325 TABLET, FILM COATED ORAL at 08:13

## 2022-07-09 RX ADMIN — POLYETHYLENE GLYCOL 3350 17 G: 17 POWDER, FOR SOLUTION ORAL at 08:13

## 2022-07-09 RX ADMIN — HEPARIN SODIUM 5000 UNITS: 5000 INJECTION, SOLUTION INTRAVENOUS; SUBCUTANEOUS at 11:53

## 2022-07-09 RX ADMIN — FLUCONAZOLE IN SODIUM CHLORIDE 200 MG: 2 INJECTION, SOLUTION INTRAVENOUS at 09:18

## 2022-07-09 RX ADMIN — CEFEPIME HYDROCHLORIDE 2 G: 2 INJECTION, POWDER, FOR SOLUTION INTRAVENOUS at 02:46

## 2022-07-09 RX ADMIN — PROPOFOL 50 MCG/KG/MIN: 10 INJECTION, EMULSION INTRAVENOUS at 06:27

## 2022-07-09 RX ADMIN — PROPOFOL 50 MCG/KG/MIN: 10 INJECTION, EMULSION INTRAVENOUS at 10:10

## 2022-07-09 RX ADMIN — PROPOFOL 50 MCG/KG/MIN: 10 INJECTION, EMULSION INTRAVENOUS at 02:46

## 2022-07-09 RX ADMIN — HEPARIN SODIUM 5000 UNITS: 5000 INJECTION, SOLUTION INTRAVENOUS; SUBCUTANEOUS at 20:07

## 2022-07-09 RX ADMIN — PROPOFOL 50 MCG/KG/MIN: 10 INJECTION, EMULSION INTRAVENOUS at 14:22

## 2022-07-09 RX ADMIN — RIFAMPIN 600 MG: 600 INJECTION, POWDER, LYOPHILIZED, FOR SOLUTION INTRAVENOUS at 09:17

## 2022-07-09 RX ADMIN — ALBUMIN HUMAN 250 ML: 0.05 INJECTION, SOLUTION INTRAVENOUS at 06:32

## 2022-07-09 RX ADMIN — VANCOMYCIN HYDROCHLORIDE 1000 MG: 1 INJECTION, SOLUTION INTRAVENOUS at 22:53

## 2022-07-09 RX ADMIN — PROPOFOL 50 MCG/KG/MIN: 10 INJECTION, EMULSION INTRAVENOUS at 20:09

## 2022-07-09 RX ADMIN — PROPOFOL 55 MCG/KG/MIN: 10 INJECTION, EMULSION INTRAVENOUS at 22:58

## 2022-07-09 RX ADMIN — CEFEPIME HYDROCHLORIDE 2 G: 2 INJECTION, POWDER, FOR SOLUTION INTRAVENOUS at 17:49

## 2022-07-09 RX ADMIN — SENNOSIDES AND DOCUSATE SODIUM 1 TABLET: 8.6; 5 TABLET ORAL at 08:13

## 2022-07-09 RX ADMIN — GABAPENTIN 100 MG: 100 CAPSULE ORAL at 08:13

## 2022-07-09 RX ADMIN — ALBUMIN HUMAN 250 ML: 0.05 INJECTION, SOLUTION INTRAVENOUS at 05:25

## 2022-07-09 RX ADMIN — ACETAMINOPHEN 975 MG: 325 TABLET, FILM COATED ORAL at 15:35

## 2022-07-09 RX ADMIN — SENNOSIDES AND DOCUSATE SODIUM 1 TABLET: 8.6; 5 TABLET ORAL at 20:07

## 2022-07-09 RX ADMIN — HUMAN INSULIN 0.2 UNITS/HR: 100 INJECTION, SOLUTION SUBCUTANEOUS at 17:53

## 2022-07-09 ASSESSMENT — ACTIVITIES OF DAILY LIVING (ADL)
ADLS_ACUITY_SCORE: 34

## 2022-07-09 NOTE — PROGRESS NOTES
CLINICAL NUTRITION SERVICES - BRIEF NOTE    Received provider consult for nutrition education with comments post op cardiovascular surgery (automatic consult on post-op order set). S/p  LVAD on 7/8. Nutrition education to be completed as able/appropriate (as pt s/p CABG and/or initial VAD).   -Patient remains NPO at this time     RD will follow     Faye Schaefer RD, LD  Weekend pager: [phone]

## 2022-07-09 NOTE — PLAN OF CARE
Goal Outcome Evaluation:    Plan of Care Reviewed With: patient     Overall Patient Progress: no change    Major Shift Events:  Pt  significant bleeding from CT's post op and overnight. 2.2L ttl since CT placement. Pt given 5 units PRBC's, 2FFP, 2PLT 75mg protamine and 1093 units KCentra to meet HGB goals and reverse coagulopathy. Pt RVAD circuit chugged intermittently throughout night requiring fluid (TTL 1L LR and 1L LR). CVTS moonlighter and CARDS fellow to bedside numerous times throughout night.    ACT in the 130's. Team aware. Heparin held d/t bleeding.     Prolonged episode of chugging at approximately 0400 with low flow alarms on LVAD. Both CVTS and Cards fellow at bedside. Dr. Arrieta, Dr. Askew updated by [employer].     Plan to continue volume and product replacement as needed to prevent or stop chugging and maintain HGB goal.     For vital signs and complete assessments, please see documentation flowsheets.

## 2022-07-09 NOTE — PROGRESS NOTES
[facility]    Cardiology Progress Note- Cards 2        Date of Admission:  6/17/2022     Assessment & Plan: HVSL   Dandy EDUARD Sands is a 61 year old male with PMH of lupus, antiphospholipid syndrome, HTN, HLD, HFrEF 2/2 ICM who presented with cardiogenic shock c/b multiorgan failure (JOSE, shock liver) requiring mechanical support with IABP, now s/p HM3 LVAD 7/8/22 by Dr. Zamora with intraoperative course c/b RV failure s/p 29F Protek duo via RIJ    #HFrEF 2/2 ICM s/p HM3 LVAD in setting of cardiogenic shock (SCAI D)  #RV failure s/p Protek duo temporary RVAD  Patient with ICM s/p HM3 LVAD 7/8/22 by Dr. Zamora in setting of presentation for cardiogenic shock requiring MCS with IABP. Intraoperative course c/b RV failure resulting in cardiogenic shock requiring high dose pressors necessitating RVAD support. Overnight pressors able to be weaned and overall end organ function appears intact at present time. Did have ongoing issues with coagulopathy overnight which were corrected via transfusion with improvement in his rate of chest tube output. Chest currently remains open. At present, goal to provide ongoing end organ perfusion and stability via transfusions for any possible coagulopathy. Case discussed with surgical service and will plan to continue to monitor chest tube output and assess need for possible washout. Will plan to continue his LVAD at current speed given his stable hemodynamics at present time and evidence of stable end organ perfusion. Will plan to work towards chest closure, extubation, and RVAD wean in coming days pending his clinical course  -continue transfusion support as needed to correct coagulopathy; will continue to monitor chest tube washout and discuss timing of closure vs need for surgical washout with surgical teams pending his course  -continue dobutamine  -continue LVAD at current speed  -continue broad spectrum abx for open  chest      The patient's care was discussed with the Attending Physician, Dr. Askew, Bedside Nurse and Patient.    Emelyn Meneses MD  [facility]    ______________________________________________________________________    Interval History   Nursing notes reviewed. Noted for ongoing chest tube output overnight requiring significant transfusion support in setting of coagulopathy. Now improving. Weaned off pressors with currently stable hemodynamics. Opened eyes and moved all 4 yesterday with neuro exam with weaned sedation.     Data reviewed today: I reviewed all medications, new labs and imaging results over the last 24 hours. I personally reviewed the chest x-ray image(s) showing stable lung appearance     Physical Exam   Vital Signs: Temp: 98.6  F (37  C) Temp src: Bladder BP: (!) 82/60 Pulse: 91   Resp: 10 SpO2: 100 % O2 Device: Mechanical Ventilator    Weight: 138 lbs 14.24 oz  General Appearance: Intubated, sedated male in ICU bed  Respiratory: ventilated lung sounds, clear anterior breath sounds  Cardiovascular: vad hum, driveline site c/d/i  GI: soft, bs hypoactive  Skin: warm, well perfused  Neuro: sedated, intubated, pupils equal     Data   Recent Labs   Lab 07/09/22  1552 07/09/22  1546 07/09/22  1405 07/09/22  0956 07/09/22  0952 07/09/22  0557 07/09/22  0546 07/09/22  0419 07/09/22  0408 07/08/22  2225 07/08/22  2213 07/08/22  1644 07/08/22  1643   WBC  --  6.1  --   --  5.3  --   --   --  6.5  --  7.8   < > 14.1*   HGB  --  9.5*  --   --  9.6*  --   --   --  9.3*  --  8.7*   < > 7.2*   MCV  --  87  --   --  87  --   --   --  85  --  89   < > 89   PLT  --  96*  --   --  87*  --   --   --  117*  --  114*   < > 163   INR  --  1.25*  --   --  1.27*  --   --   --  1.22*  --  1.45*   < > 1.87*   NA  --  137  --   --  136  --   --   --  136  --  137  --  141   POTASSIUM  --  4.1  --   --  4.8  --  5.2  --  5.6*  --  4.4  --  3.9   CHLORIDE  --  105  --   --   105  --   --   --  105  --  106  --  107   CO2  --  23  --   --  23  --   --   --  22  --  22  --  20*   BUN  --  14.6  --   --  15.4  --   --   --  17.1  --  16.7  --  14.0   CR  --  0.64*  --   --  0.71  --   --   --  0.77  --  0.78  --  0.73   ANIONGAP  --  9  --   --  8  --   --   --  9  --  9  --  14   ELDA  --  8.5*  --   --  8.4*  --   --   --  8.4*  --  8.3*  --  8.2*   * 114* 109*   < > 156*   < >  --    < > 123*   < > 101*   < > 108*   ALBUMIN  --   --   --   --   --   --   --   --  2.9*  --  2.5*  --  2.3*   PROTTOTAL  --   --   --   --   --   --   --   --   --   --  4.4*  --  3.8*   BILITOTAL  --   --   --   --   --   --   --   --   --   --  1.0  --  1.0   ALKPHOS  --   --   --   --   --   --   --   --   --   --  59  --  51   ALT  --   --   --   --   --   --   --   --  29  --  29  --  25   AST  --   --   --   --   --   --   --   --   --   --  159*  --  134*    < > = values in this interval not displayed.     Recent Results (from the past 24 hour(s))   Cardiac Device Check - Inpatient   Result Value    Date Time Interrogation Session [id number]    Implantable Pulse Generator  Medtronic    Implantable Pulse Generator Model [id number] Eliseo TIRADO DR    Implantable Pulse Generator Serial Number [id number]    Type Interrogation Session In Clinic    Clinic Name [facility]    Implantable Pulse Generator Type Defibrillator    Implantable Pulse Generator Implant Date 20180412    Implantable Lead  Medtronic    Implantable Lead Model 5076 CapSureFix Novus    Implantable Lead Serial Number [id number]    Implantable Lead Implant Date 20060306    Implantable Lead Polarity Type Bipolar Lead    Implantable Lead Location Detail 1 UNKNOWN    Implantable Lead Location Right Atrium    Implantable Lead  Medtronic    Implantable Lead Model [facility]    Implantable Lead Serial Number [id number]    Implantable Lead Implant Date 20060306    Implantable  Lead Polarity Type Bipolar Lead    Implantable Lead Location Detail 1 UNKNOWN    Implantable Lead Location Right Ventricle    Marcos Setting Mode (NBG Code) MVP_AAI_DDD    Marcos Setting Lower Rate Limit 50    Marcos Setting Maximum Tracking Rate 130    Marcos Setting Maximum Sensor Rate 115    Marcos Setting Hysterisis Rate DISABLED    Marcos Setting TRISTEN Delay Low 350    Marcos Setting PAV Delay Low 350    Marcos Setting AT Mode Switch Rate 171    Lead Channel Setting Sensing Polarity Bipolar    Lead Channel Setting Sensing Anode Location Right Atrium    Lead Channel Setting Sensing Anode Terminal Ring    Lead Channel Setting Sensing Cathode Location Right Atrium    Lead Channel Setting Sensing Cathode Terminal Tip    Lead Channel Setting Sensing Sensitivity 0.3    Lead Channel Setting Sensing Polarity Bipolar    Lead Channel Setting Sensing Anode Location Right Ventricle    Lead Channel Setting Sensing Anode Terminal Ring    Lead Channel Setting Sensing Cathode Location Right Ventricle    Lead Channel Setting Sensing Cathode Terminal Tip    Lead Channel Setting Sensing Sensitivity 0.3    Lead Channel Setting Pacing Polarity Bipolar    Lead Channel Setting Pacing Anode Location Right Atrium    Lead Channel Setting Pacing Anode Terminal Ring    Lead Channel Setting Sensing Cathode Location Right Atrium    Lead Channel Setting Sensing Cathode Terminal Tip    Lead Channel Setting Pacing Pulse Width 0.4    Lead Channel Setting Pacing Amplitude 1.5    Lead Channel Setting Pacing Capture Mode Adaptive    Lead Channel Setting Pacing Polarity Bipolar    Lead Channel Setting Pacing Anode Location Right Ventricle    Lead Channel Setting Pacing Anode Terminal Ring    Lead Channel Setting Sensing Cathode Location Right Ventricle    Lead Channel Setting Sensing Cathode Terminal Tip    Lead Channel Setting Pacing Pulse Width 0.4    Lead Channel Setting Pacing Amplitude 4    Lead Channel Setting Pacing Capture Mode Adaptive    Zone  Setting Type Category VF    Zone Setting Detection Interval 320    Zone Setting Detection Beats Numerator 30    Zone Setting Detection Beats Denominator 40    Zone Setting Type Category VT    Zone Setting Detection Interval 280    Zone Setting Type Category VT    Zone Setting Detection Interval 350    Zone Setting Type Category VT    Zone Setting Detection Interval 400    Zone Setting Type Category ATRIAL_FIBRILLATION    Zone Setting Type Category AT/AF    Zone Setting Detection Interval 350    Lead Channel Impedance Value 285    Lead Channel Sensing Intrinsic Amplitude 0.5    Lead Channel Pacing Threshold Amplitude 0.75    Lead Channel Pacing Threshold Pulse Width 0.4    Lead Channel Impedance Value 760    Lead Channel Impedance Value 551    Lead Channel Sensing Intrinsic Amplitude 1.6    Lead Channel Pacing Threshold Amplitude 2.75    Lead Channel Pacing Threshold Pulse Width 0.4    Battery Date Time of Measurements 20220708170338    Battery Status OK    Battery RRT Trigger 2.727    Battery Remaining Longevity 63    Battery Voltage 2.96    Capacitor Charge Type Reformation    Capacitor Last Charge Date Time [id number]    Capacitor Charge Time 3.973    Capacitor Charge Energy 18    Marcos Statistic Date Time Start 20220708073708    Marcos Statistic Date Time End 20220708170205    Marcos Statistic RA Percent Paced 0.69    Marcos Statistic RV Percent Paced 7.74    Marcos Statistic AP  Percent 0.52    Marcos Statistic AS  Percent 7.6    Marcos Statistic AP VS Percent 0.21    Marcos Statistic AS VS Percent 91.67    Atrial Tachy Statistic Date Time Start 20220708073708    Atrial Tachy Statistic Date Time End 20220708170205    Atrial Tachy Statistic AT/AF [place] Percent 1.1    Therapy Statistic Recent Shocks Delivered 0    Therapy Statistic Recent Shocks Aborted 0    Therapy Statistic Recent ATP Delivered 0    Therapy Statistic Recent Date Time Start 20220708073708    Therapy Statistic Recent Date Time End 20220708170205     Therapy Statistic Total Shocks Delivered 1    Therapy Statistic Total Shocks Aborted 0    Therapy Statistic Total ATP Delivered 0    Therapy Statistic Total  Date Time Start 53172858430796    Therapy Statistic Total  Date Time End 61459406800147    Episode Statistic Recent Count 3    Episode Statistic Type Category AT/AF    Episode Statistic Recent Count 0    Episode Statistic Type Category SVT    Episode Statistic Recent Count 0    Episode Statistic Type Category VT    Episode Statistic Recent Count 0    Episode Statistic Type Category VF    Episode Statistic Recent Count 0    Episode Statistic Type Category VT    Episode Statistic Recent Count 0    Episode Statistic Type Category VT    Episode Statistic Recent Count 0    Episode Statistic Type Category VT    Episode Statistic Recent Date Time Start 73958163517031    Episode Statistic Recent Date Time End 93515914332100    Episode Statistic Recent Date Time Start 72127764094935    Episode Statistic Recent Date Time End 20220708170205    Episode Statistic Recent Date Time Start 34725748542126    Episode Statistic Recent Date Time End 20220708170205    Episode Statistic Recent Date Time Start 19372124020577    Episode Statistic Recent Date Time End 34131123594575    Episode Statistic Recent Date Time Start 94592079677588    Episode Statistic Recent Date Time End 09244067582166    Episode Statistic Recent Date Time Start 92520712456390    Episode Statistic Recent Date Time End 21405931501492    Episode Statistic Recent Date Time Start 52610970047092    Episode Statistic Recent Date Time End 46922065427686    Episode Statistic Total Count 5    Episode Statistic Type Category AT/AF    Episode Statistic Total Count 0    Episode Statistic Type Category SVT    Episode Statistic Total Count 9    Episode Statistic Type Category VT    Episode Statistic Total Count 1    Episode Statistic Type Category VF    Episode Statistic Total Count 0    Episode Statistic Type Category  VT    Episode Statistic Total Count 0    Episode Statistic Type Category VT    Episode Statistic Total Count 1    Episode Statistic Type Category VT    Episode Statistic Total Date Time Start 20180412151624    Episode Statistic Total Date Time End 20220708170205    Episode Statistic Total Date Time Start 20180412151624    Episode Statistic Total Date Time End 20220708170205    Episode Statistic Total Date Time Start 20180412151624    Episode Statistic Total Date Time End 20220708170205    Episode Statistic Total Date Time Start 20180412151624    Episode Statistic Total Date Time End 20220708170205    Episode Statistic Total Date Time Start 20180412151624    Episode Statistic Total Date Time End 20220708170205    Episode Statistic Total Date Time Start 20180412151624    Episode Statistic Total Date Time End 20220708170205    Episode Statistic Total Date Time Start 20180412151624    Episode Statistic Total Date Time End 20220708170205    Episode Identifier 28    Episode Type Category Monitor    Episode Date Time 20220708123149    Episode Duration 176    Episode Identifier 27    Episode Type Category Monitor    Episode Date Time 20220708114306    Episode Duration 8    Episode Identifier 26    Episode Type Category Monitor    Episode Date Time [id number]    Episode Duration 79    Narrative    Patient seen in [address] [place] [address] for evaluation and iterative programming of their ICD per MD orders POST LVAD implant.    Device: Medtronic [id number] Evera XT DR  Normal Device Function.  Mode: AAI<=>DDD /130 bpm  AP: 0.7%  : 7.7%  Intrinsic rhythm: SR @ 85 bpm  Short V-V intervals: 35, likely due to cautery  Lead Trends Appear Stable: RV threshold increased post-op, measuring at 2.75 V @ 0.4 ms. Patient paces minimally in RV at baseline. Programmed RV Amplitude increased and will continue to monitor.  Estimated battery longevity to RRT = 5.2 years. Battery voltage = 2.97 V.  Atrial arrhythmia: 3 AT/AF episodes recorded during  procedure today. EGMs reveal ROHIT and FFRW oversensing.  AF burden: 1.1% (erroneous due to ROHIT intra-op)  Anticoagulant: Heparin gtt  Ventricular Arrhythmia: None  Setting changes: Tachy therapies turned back on (VF zone, FVT zone, and VT Monitor). Increased RV amplitude to 4.0 V @ 0.4 ms for additional safety margin.    Plan: Continue inpatient evaluation and treatment.  RICKIE Mcpherson RN    Dual lead ICD    I have reviewed and interpreted the device interrogation, settings, programming and nurse's summary. The device is functioning within normal device parameters. I agree with the current findings, assessment and plan.   XR Chest Port 1 View    Narrative    EXAM: XR CHEST PORT 1 VIEW  7/8/2022 7:36 PM     HISTORY:  Post Op CVTS Surgery       COMPARISON:  Radiograph 7/8/2022.    TECHNIQUE: AP supine view of the chest    FINDINGS:   Devices, lines, tubes: Right internal jugular ECMO cannula with tip  projecting over the main pulmonary artery. Left basilar chest tube in  place. LVAD in place. Mediastinal/pericardial drains in place. Packing  material projecting over the mediastinum. Endotracheal tube projecting  over the midthoracic trachea. Left chest wall implantable cardiac  defibrillator and leads in stable positioning. Right upper extremity  PICC appears to have a coil projecting over the right subclavian vein  with tip obscured by overlying catheters and packing material.  Surgical clips projecting over the right axilla.    The trachea is midline. The cardiomediastinal silhouette is stable. No  appreciable pneumothorax, pleural effusion, or focal consolidative  opacity. No acute osseous abnormality.        Impression    IMPRESSION:   1. Endotracheal tube tip projecting over the midthoracic trachea.  2. Right internal jugular ECMO cannula with tip projecting over the  main pulmonary artery.  3. Interval placement of LVAD device with packing material projecting  over the mediastinum.  4. Right upper extremity PICC  appears to coil within it projecting  over the right subclavian vein with tip obscured by overlying  catheters and packing material.  5. No focal airspace opacity.    I have personally reviewed the examination and initial interpretation  and I agree with the findings.    KHADAR CAMPOS MD         SYSTEM ID:  [id number]   XR Abdomen Port 1 View    Narrative    EXAMINATION:  XR ABDOMEN PORT 1 VIEWS 7/8/2022 7:38 PM     COMPARISON: none.    HISTORY: CT 7/3/2022..    TECHNIQUE: Frontal view of the abdomen.    FINDINGS: Enteric tube side port projecting over the stomach and tip  projecting over the pylorus. Interval placement of LVAD. ECMO cannula  tip projecting over the main pulmonary artery. Pericardial/mediastinal  drains in place. Left basilar chest tube. Packing material projecting  over the mediastinum. No abnormally dilated loops of bowel. No  pneumatosis or portal venous gas.       Impression    IMPRESSION:   Enteric tube side-port projecting over the stomach and tip projecting  over the pylorus..    I have personally reviewed the examination and initial interpretation  and I agree with the findings.    KHADAR CAMPOS MD         SYSTEM ID:  [id number]   XR Chest Port 1 View   Result Value    Radiologist flags right picc malposition (Urgent)    Narrative    EXAM: XR CHEST PORT 1 VIEW  7/9/2022 3:03 AM     HISTORY:  Post Op CVTS Surgery       COMPARISON:  7/8/2022    FINDINGS  Technique: Supine AP view of the chest.     Devices: Endotracheal tube terminates over the midthoracic trachea  approximately 6 cm above the paola. Right subclavian approach  intra-aortic balloon pump superior marker projects over the aortic  arch, unchanged. Left chest 3-lead cardiac conduction device. LVAD  device projects over the lower left hemithorax. Mediastinal drains  project over the mediastinum. Right internal jugular ECMO cannula  terminates over the main pulmonary artery. Packing material markers  project over the superior  mediastinum. External pacer leads project  over the left chest. Right PICC terminates over the mid SVC.  Left-sided coronary stent.    No new focal airspace opacity.  No discernible pneumothorax.  No  pleural effusion.     Cardiomediastinal silhouette is  stable in size.      Impression    IMPRESSION:     1. Right internal jugular approach ECMO cannula terminates over the  main pulmonary artery.  2. Right upper extremity PICC remains coiled in the expected location  of the right subclavian vein with tip projecting over the central  right brachiocephalic vein.  3. Endotracheal tube terminates approximately 6 cm above the paola.  4. Otherwise stable post operative chest with multiple surgical  sponges projecting over the mediastinum      [Urgent Result: right picc malposition]    Finding was identified on 7/9/2022 4:29 AM.     LITA Rivera was contacted by Dr. Courtney at 7/9/2022 9:58 AM and  verbalized understanding of the urgent finding.     I have personally reviewed the examination and initial interpretation  and I agree with the findings.    GABRIEL COURTNEY MD         SYSTEM ID:  [id number]   XR Chest Port 1 View    Narrative    EXAM: XR CHEST PORT 1 VIEW, 7/9/2022 4:49 AM    INDICATION: bleeding    COMPARISON:  7/9/2022    FINDINGS  Technique: Supine AP view of the chest.     Devices: Endotracheal tube terminates over the midthoracic trachea  approximately 6 cm above the paola. Right subclavian approach  intra-aortic balloon pump superior marker projects over the aortic  arch, unchanged. Left chest 3-lead cardiac conduction device. LVAD  device projects over the lower left hemithorax. Mediastinal drains  project over the mediastinum. Right internal jugular ECMO cannula  terminates over the main pulmonary artery. Packing material markers  project over the superior mediastinum. External pacer leads project  over the left chest. Right PICC terminates over the mid SVC.  Left-sided coronary stent.    No new focal  airspace opacity.  No discernible pneumothorax.  No  pleural effusion.  Unchanged cardiomegaly.      Impression    IMPRESSION:   No interval change.    I have personally reviewed the examination and initial interpretation  and I agree with the findings.    GABRIEL COURTNEY MD         SYSTEM ID:  [id number]     Medications     BETA BLOCKER NOT PRESCRIBED       DOBUTamine 2.5 mcg/kg/min (07/09/22 1700)     EPINEPHrine Stopped (07/08/22 2100)     fentaNYL 100 mcg/hr (07/09/22 1700)     insulin regular 0.2 Units/hr (07/09/22 1700)     norepinephrine Stopped (07/08/22 2030)     Patient RECEIVING antibiotic to treat a different condition and it provides ADEQUATE COVERAGE for this surgical procedure.       propofol (DIPRIVAN) infusion 50 mcg/kg/min (07/09/22 1700)     vasopressin Stopped (07/08/22 2015)       acetaminophen  975 mg Oral or Feeding Tube Q8H     ceFEPIme (MAXIPIME) IV  2 g Intravenous Q8H     fluconazole  200 mg Intravenous Q24H     heparin ANTICOAGULANT  5,000 Units Subcutaneous Q8H     mupirocin  1 g Both Nostrils BID     pantoprazole  40 mg Oral or NG Tube Daily    Or     pantoprazole  40 mg Oral Daily     polyethylene glycol  17 g Oral or Feeding Tube Daily     rifampin  600 mg Intravenous Q24H     senna-docusate  1 tablet Oral or Feeding Tube BID     sodium chloride (PF)  3 mL Intracatheter Q8H     sodium chloride (PF)  3 mL Intracatheter Q8H     vancomycin  1,000 mg Intravenous Q12H

## 2022-07-09 NOTE — PROGRESS NOTES
ECMO Shift Summary: 3391-3875      ECMO Equipment:  Console serial number: Primary [id number], secondary [id number]  Circuit Lot number: [id number]  Oxygenator Lot number: [id number]  Pump head lot number: [id number]      Patient remains on RVAD without sweep flow, all equipment is functioning and alarms are appropriately set. RPM's 6097-1986 with flow range 3.28-3.4 L/min. Sweep gas is at 0 LPM. Circuit remains free of visible air.  Clot and fibrin post oxygenator and connectors. Cannulas are secure with no bleeding from site. Extremities are cool but perfused.     Significant Shift Events: Patient had a few brief losses of flow, but didn't require volume.  Adjusted RVAD rpm.  Patient has been stable since.      Vent settings:  Vent Mode: CMV/AC  (Continuous Mandatory Ventilation/ Assist Control)  FiO2 (%): 40 %  Resp Rate (Set): 14 breaths/min  Tidal Volume (Set, mL): 450 mL  PEEP (cm H2O): 5 cmH2O  Resp: 14  .    ACT range 126-143.    Urine output is 680 since midnight, blood loss was minimal. Product given included one unit of PRBC's 2/2 hgb, and one unit of plasma for volume replacement.       Intake/Output Summary (Last 24 hours) at 7/9/2022 1657  Last data filed at 7/9/2022 1600  Gross per 24 hour   Intake 8455.63 ml   Output 3880 ml   Net 4575.63 ml       ECHO:  No results found for this or any previous visit.  No results found for this or any previous visit.      CXR:  Recent Results (from the past 24 hour(s))   Cardiac Device Check - Inpatient   Result Value    Date Time Interrogation Session [id number]    Implantable Pulse Generator  Medtronic    Implantable Pulse Generator Model [id number] Eliseo TIRADO DR    Implantable Pulse Generator Serial Number [id number]    Type Interrogation Session In Clinic    Clinic Name [facility]    Implantable Pulse Generator Type Defibrillator    Implantable Pulse Generator Implant Date 20180412    Implantable Lead  Medtronic     Implantable Lead Model 5076 CapSureFix Novus    Implantable Lead Serial Number [id number]    Implantable Lead Implant Date 20060306    Implantable Lead Polarity Type Bipolar Lead    Implantable Lead Location Detail 1 UNKNOWN    Implantable Lead Location Right Atrium    Implantable Lead  Medtronic    Implantable Lead Model [address] [place]    Implantable Lead Serial Number [id number]    Implantable Lead Implant Date 20060306    Implantable Lead Polarity Type Bipolar Lead    Implantable Lead Location Detail 1 UNKNOWN    Implantable Lead Location Right Ventricle    Marcos Setting Mode (NBG Code) MVP_AAI_DDD    Marcos Setting Lower Rate Limit 50    Marcos Setting Maximum Tracking Rate 130    Marcos Setting Maximum Sensor Rate 115    Marcos Setting Hysterisis Rate DISABLED    Marcos Setting TRISTEN Delay Low 350    Marcos Setting PAV Delay Low 350    Marcos Setting AT Mode Switch Rate 171    Lead Channel Setting Sensing Polarity Bipolar    Lead Channel Setting Sensing Anode Location Right Atrium    Lead Channel Setting Sensing Anode Terminal Ring    Lead Channel Setting Sensing Cathode Location Right Atrium    Lead Channel Setting Sensing Cathode Terminal Tip    Lead Channel Setting Sensing Sensitivity 0.3    Lead Channel Setting Sensing Polarity Bipolar    Lead Channel Setting Sensing Anode Location Right Ventricle    Lead Channel Setting Sensing Anode Terminal Ring    Lead Channel Setting Sensing Cathode Location Right Ventricle    Lead Channel Setting Sensing Cathode Terminal Tip    Lead Channel Setting Sensing Sensitivity 0.3    Lead Channel Setting Pacing Polarity Bipolar    Lead Channel Setting Pacing Anode Location Right Atrium    Lead Channel Setting Pacing Anode Terminal Ring    Lead Channel Setting Sensing Cathode Location Right Atrium    Lead Channel Setting Sensing Cathode Terminal Tip    Lead Channel Setting Pacing Pulse Width 0.4    Lead Channel Setting Pacing Amplitude 1.5    Lead Channel Setting  Pacing Capture Mode Adaptive    Lead Channel Setting Pacing Polarity Bipolar    Lead Channel Setting Pacing Anode Location Right Ventricle    Lead Channel Setting Pacing Anode Terminal Ring    Lead Channel Setting Sensing Cathode Location Right Ventricle    Lead Channel Setting Sensing Cathode Terminal Tip    Lead Channel Setting Pacing Pulse Width 0.4    Lead Channel Setting Pacing Amplitude 4    Lead Channel Setting Pacing Capture Mode Adaptive    Zone Setting Type Category VF    Zone Setting Detection Interval 320    Zone Setting Detection Beats Numerator 30    Zone Setting Detection Beats Denominator 40    Zone Setting Type Category VT    Zone Setting Detection Interval 280    Zone Setting Type Category VT    Zone Setting Detection Interval 350    Zone Setting Type Category VT    Zone Setting Detection Interval 400    Zone Setting Type Category ATRIAL_FIBRILLATION    Zone Setting Type Category AT/AF    Zone Setting Detection Interval 350    Lead Channel Impedance Value 285    Lead Channel Sensing Intrinsic Amplitude 0.5    Lead Channel Pacing Threshold Amplitude 0.75    Lead Channel Pacing Threshold Pulse Width 0.4    Lead Channel Impedance Value 760    Lead Channel Impedance Value 551    Lead Channel Sensing Intrinsic Amplitude 1.6    Lead Channel Pacing Threshold Amplitude 2.75    Lead Channel Pacing Threshold Pulse Width 0.4    Battery Date Time of Measurements [id number]    Battery Status OK    Battery RRT Trigger 2.727    Battery Remaining Longevity 63    Battery Voltage 2.96    Capacitor Charge Type Reformation    Capacitor Last Charge Date Time 18734030747890    Capacitor Charge Time 3.973    Capacitor Charge Energy 18    Marcos Statistic Date Time Start 26398806314633    Marcos Statistic Date Time End 55390123569079    Marcos Statistic RA Percent Paced 0.69    Marcos Statistic RV Percent Paced 7.74    Marcos Statistic AP  Percent 0.52    Marcos Statistic AS  Percent 7.6    Marcos Statistic AP VS Percent  0.21    Marcos Statistic AS VS Percent 91.67    Atrial Tachy Statistic Date Time Start 20220708073708    Atrial Tachy Statistic Date Time End 20220708170205    Atrial Tachy Statistic AT/AF Nora Percent 1.1    Therapy Statistic Recent Shocks Delivered 0    Therapy Statistic Recent Shocks Aborted 0    Therapy Statistic Recent ATP Delivered 0    Therapy Statistic Recent Date Time Start 20220708073708    Therapy Statistic Recent Date Time End 20220708170205    Therapy Statistic Total Shocks Delivered 1    Therapy Statistic Total Shocks Aborted 0    Therapy Statistic Total ATP Delivered 0    Therapy Statistic Total  Date Time Start 34133919886145    Therapy Statistic Total  Date Time End 20220708170205    Episode Statistic Recent Count 3    Episode Statistic Type Category AT/AF    Episode Statistic Recent Count 0    Episode Statistic Type Category SVT    Episode Statistic Recent Count 0    Episode Statistic Type Category VT    Episode Statistic Recent Count 0    Episode Statistic Type Category VF    Episode Statistic Recent Count 0    Episode Statistic Type Category VT    Episode Statistic Recent Count 0    Episode Statistic Type Category VT    Episode Statistic Recent Count 0    Episode Statistic Type Category VT    Episode Statistic Recent Date Time Start 45733901698082    Episode Statistic Recent Date Time End 20220708170205    Episode Statistic Recent Date Time Start 45536795479584    Episode Statistic Recent Date Time End 20220708170205    Episode Statistic Recent Date Time Start 10797283758792    Episode Statistic Recent Date Time End 20220708170205    Episode Statistic Recent Date Time Start 21133090773745    Episode Statistic Recent Date Time End 20220708170205    Episode Statistic Recent Date Time Start 81337510411314    Episode Statistic Recent Date Time End 20220708170205    Episode Statistic Recent Date Time Start 87069364510208    Episode Statistic Recent Date Time End 20220708170205    Episode Statistic  Recent Date Time Start 42209625129796    Episode Statistic Recent Date Time End 20220708170205    Episode Statistic Total Count 5    Episode Statistic Type Category AT/AF    Episode Statistic Total Count 0    Episode Statistic Type Category SVT    Episode Statistic Total Count 9    Episode Statistic Type Category VT    Episode Statistic Total Count 1    Episode Statistic Type Category VF    Episode Statistic Total Count 0    Episode Statistic Type Category VT    Episode Statistic Total Count 0    Episode Statistic Type Category VT    Episode Statistic Total Count 1    Episode Statistic Type Category VT    Episode Statistic Total Date Time Start 49852501908828    Episode Statistic Total Date Time End 20220708170205    Episode Statistic Total Date Time Start 85162876941287    Episode Statistic Total Date Time End 47127621058612    Episode Statistic Total Date Time Start 42838149396174    Episode Statistic Total Date Time End 08517569428943    Episode Statistic Total Date Time Start 32474330437068    Episode Statistic Total Date Time End 09448394692846    Episode Statistic Total Date Time Start 32528330438526    Episode Statistic Total Date Time End 24033118209506    Episode Statistic Total Date Time Start 45276421236737    Episode Statistic Total Date Time End 29969632009417    Episode Statistic Total Date Time Start 08786983875372    Episode Statistic Total Date Time End 62719946271555    Episode Identifier 28    Episode Type Category Monitor    Episode Date Time [id number]    Episode Duration 176    Episode Identifier 27    Episode Type Category Monitor    Episode Date Time [id number]    Episode Duration 8    Episode Identifier 26    Episode Type Category Monitor    Episode Date Time [id number]    Episode Duration 79    Narrative    Patient seen in [address] [place] [address] for evaluation and iterative programming of their ICD per MD orders POST LVAD implant.    Device: Medtronic [id number] Evera CANDIDA GUTIERREZ  Normal Device  Function.  Mode: AAI<=>DDD /130 bpm  AP: 0.7%  : 7.7%  Intrinsic rhythm: SR @ 85 bpm  Short V-V intervals: 35, likely due to cautery  Lead Trends Appear Stable: RV threshold increased post-op, measuring at 2.75 V @ 0.4 ms. Patient paces minimally in RV at baseline. Programmed RV Amplitude increased and will continue to monitor.  Estimated battery longevity to RRT = 5.2 years. Battery voltage = 2.97 V.  Atrial arrhythmia: 3 AT/AF episodes recorded during procedure today. EGMs reveal ROHIT and FFRW oversensing.  AF burden: 1.1% (erroneous due to ROHIT intra-op)  Anticoagulant: Heparin gtt  Ventricular Arrhythmia: None  Setting changes: Tachy therapies turned back on (VF zone, FVT zone, and VT Monitor). Increased RV amplitude to 4.0 V @ 0.4 ms for additional safety margin.    Plan: Continue inpatient evaluation and treatment.  RICKIE Mcpherson RN    Dual lead ICD    I have reviewed and interpreted the device interrogation, settings, programming and nurse's summary. The device is functioning within normal device parameters. I agree with the current findings, assessment and plan.   XR Chest Port 1 View    Narrative    EXAM: XR CHEST PORT 1 VIEW  7/8/2022 7:36 PM     HISTORY:  Post Op CVTS Surgery       COMPARISON:  Radiograph 7/8/2022.    TECHNIQUE: AP supine view of the chest    FINDINGS:   Devices, lines, tubes: Right internal jugular ECMO cannula with tip  projecting over the main pulmonary artery. Left basilar chest tube in  place. LVAD in place. Mediastinal/pericardial drains in place. Packing  material projecting over the mediastinum. Endotracheal tube projecting  over the midthoracic trachea. Left chest wall implantable cardiac  defibrillator and leads in stable positioning. Right upper extremity  PICC appears to have a coil projecting over the right subclavian vein  with tip obscured by overlying catheters and packing material.  Surgical clips projecting over the right axilla.    The trachea is midline. The  cardiomediastinal silhouette is stable. No  appreciable pneumothorax, pleural effusion, or focal consolidative  opacity. No acute osseous abnormality.        Impression    IMPRESSION:   1. Endotracheal tube tip projecting over the midthoracic trachea.  2. Right internal jugular ECMO cannula with tip projecting over the  main pulmonary artery.  3. Interval placement of LVAD device with packing material projecting  over the mediastinum.  4. Right upper extremity PICC appears to coil within it projecting  over the right subclavian vein with tip obscured by overlying  catheters and packing material.  5. No focal airspace opacity.    I have personally reviewed the examination and initial interpretation  and I agree with the findings.    KHADAR CAMPOS MD         SYSTEM ID:  [id number]   XR Abdomen Port 1 View    Narrative    EXAMINATION:  XR ABDOMEN PORT 1 VIEWS 7/8/2022 7:38 PM     COMPARISON: none.    HISTORY: CT 7/3/2022..    TECHNIQUE: Frontal view of the abdomen.    FINDINGS: Enteric tube side port projecting over the stomach and tip  projecting over the pylorus. Interval placement of LVAD. ECMO cannula  tip projecting over the main pulmonary artery. Pericardial/mediastinal  drains in place. Left basilar chest tube. Packing material projecting  over the mediastinum. No abnormally dilated loops of bowel. No  pneumatosis or portal venous gas.       Impression    IMPRESSION:   Enteric tube side-port projecting over the stomach and tip projecting  over the pylorus..    I have personally reviewed the examination and initial interpretation  and I agree with the findings.    KHADAR CAMPOS MD         SYSTEM ID:  [id number]   XR Chest Port 1 View   Result Value    Radiologist flags right picc malposition (Urgent)    Narrative    EXAM: XR CHEST PORT 1 VIEW  7/9/2022 3:03 AM     HISTORY:  Post Op CVTS Surgery       COMPARISON:  7/8/2022    FINDINGS  Technique: Supine AP view of the chest.     Devices: Endotracheal tube  terminates over the midthoracic trachea  approximately 6 cm above the paola. Right subclavian approach  intra-aortic balloon pump superior marker projects over the aortic  arch, unchanged. Left chest 3-lead cardiac conduction device. LVAD  device projects over the lower left hemithorax. Mediastinal drains  project over the mediastinum. Right internal jugular ECMO cannula  terminates over the main pulmonary artery. Packing material markers  project over the superior mediastinum. External pacer leads project  over the left chest. Right PICC terminates over the mid SVC.  Left-sided coronary stent.    No new focal airspace opacity.  No discernible pneumothorax.  No  pleural effusion.     Cardiomediastinal silhouette is  stable in size.      Impression    IMPRESSION:     1. Right internal jugular approach ECMO cannula terminates over the  main pulmonary artery.  2. Right upper extremity PICC remains coiled in the expected location  of the right subclavian vein with tip projecting over the central  right brachiocephalic vein.  3. Endotracheal tube terminates approximately 6 cm above the paola.  4. Otherwise stable post operative chest with multiple surgical  sponges projecting over the mediastinum      [Urgent Result: right picc malposition]    Finding was identified on 7/9/2022 4:29 AM.     LITA Rivera was contacted by Dr. Courtney at 7/9/2022 9:58 AM and  verbalized understanding of the urgent finding.     I have personally reviewed the examination and initial interpretation  and I agree with the findings.    GABRIEL COURTNEY MD         SYSTEM ID:  [id number]   XR Chest Port 1 View    Narrative    EXAM: XR CHEST PORT 1 VIEW, 7/9/2022 4:49 AM    INDICATION: bleeding    COMPARISON:  7/9/2022    FINDINGS  Technique: Supine AP view of the chest.     Devices: Endotracheal tube terminates over the midthoracic trachea  approximately 6 cm above the paola. Right subclavian approach  intra-aortic balloon pump superior marker  projects over the aortic  arch, unchanged. Left chest 3-lead cardiac conduction device. LVAD  device projects over the lower left hemithorax. Mediastinal drains  project over the mediastinum. Right internal jugular ECMO cannula  terminates over the main pulmonary artery. Packing material markers  project over the superior mediastinum. External pacer leads project  over the left chest. Right PICC terminates over the mid SVC.  Left-sided coronary stent.    No new focal airspace opacity.  No discernible pneumothorax.  No  pleural effusion.  Unchanged cardiomegaly.      Impression    IMPRESSION:   No interval change.    I have personally reviewed the examination and initial interpretation  and I agree with the findings.    GABRIEL COURTNEY MD         SYSTEM ID:  [id number]       Labs:  Recent Labs   Lab 07/09/22  1546 07/09/22  1358 07/09/22  1200 07/09/22  0952   PH 7.43 7.44 7.34* 7.34*   PCO2 38 37 46* 46*   PO2 154* 177* 182* 175*   HCO3 25 25 25 25   O2PER 40 40 40 40       Lab Results   Component Value Date    HGB 9.5 (L) 07/09/2022    PHGB 40 (H) 07/09/2022    PLT 96 (L) 07/09/2022    FIBR 269 07/09/2022    INR 1.25 (H) 07/09/2022    PTT 44 (H) 07/09/2022    DD 3.13 (H) 07/09/2022    ANTCH 72 (L) 07/09/2022         Plan is to continue support.  May cut oxygenator away tomorrow.  Planning for wash out and closure Monday if not tomorrow.        Parth Whalen, RT  ECMO Specialist  7/9/2022 4:57 PM

## 2022-07-09 NOTE — PROGRESS NOTES
ICU End of Shift Summary. See flowsheets for vital signs and detailed assessment.    Changes this shift:     Neuro: remains sedated on propofol 50 and fentanyl 100. Grimaces to pain, oral cares. Moves all extremities during sedation holiday.     Cardiac: Remains NSR without ectopy, off pressors. LVAD PI 2.2-2.8 but pressures tolerating. LVAD without alarms. RVAD with frequent low flow alarms after rpm increase, alarms ceased with rpm adjustment and repositioning. Platelets down to 87, per CVICU, hold platelet transfusion in the setting of a VAD without active bleeding.     Respiratory: respiratory alkalosis on serial ABG, RR increased from 10 to 14. F/u ABG corrected.    : carolynn/orange UOP via marcelo, averaging 15-60cc per hour.     GI: xray feeding tube placement order placed. Radiology paged and scheduled for Monday. Low flow alarms on RVAD with reverse trendelenburg position, therefore elected to hold tube feeds for now. Insulin infusion restarted per algorithm 1.    LDA: notified by radiology of coiled RUE PICC line. CV ICU and PICC RN notified. Elected to hold power flushing due to medications inline. Will continue to monitor and OK to use per MD.     Plan: Xray feeding tube placement Monday. Chest washout and closure Monday.

## 2022-07-09 NOTE — PLAN OF CARE
Admitted/transferred from: OR at 1625  Reason for admission/transfer: ICU admission  Patient status upon admission/transfer: stable on LVAD with Protek, NO at 20ppm, epi, norepi and vaso.   Interventions: 1u PRBCs given for pallor and labs trended. CVICU/surgery notified for chest tube output, cardiology also aware. Second unit PRBCs given, protamine and FFP also ordered.  given.   Plan: Continue to monitor hemodynamics, trend labs and replace product as necessary. NOK bedside and updated.   2 RN skin assessment: completed by Gregorio Cleaning  Result of skin assessment and interventions/actions: open chest, LUE ecchymosis, unchanged, right internal jugular cannulation, left femoral CVC/arterial line.   Height, weight, drug calc weight: done.  Patient belongings (see Flowsheet - Adult Profile for details): accounted for.  MDRO education (if applicable):  n/a    Goal Outcome Evaluation:

## 2022-07-09 NOTE — PROGRESS NOTES
ECMO Shift Summary: (1845 to 0715)    ECMO Equipment:  Console serial number: Primary [id number], Secondary [id number]  Circuit Lot number: [id number]  Oxygenator Lot number: [id number]                                                 Pump head lot number:  [id number]     Patient remains on RVAD + Oxygenator Support, all equipment is functioning and alarms are appropriately set. RPMs 4000 with flow range 3.33 - 3.4 L/min. Sweep gas was turned off ~0500. Fibrin rings developing at connectors. Oxygenator has new clot/fibrin deposits + turbulent flow developing on the pre/venous side of the oxygenator. Cannula is secure with no bleeding from site. Extremities are cool..     Significant Shift Events:   Pt had frequent episodes of chugging that were slow to resolve with volume administration, often required >250 mL of volume prior to resolution. Less frequent chugging episodes with slow, continuous administration of colloids. Steady output from CTs despite correction of coagulopathies and replacement of blood products; output becoming progressively more clotty throughout the shift. Some bleeding from LVAD + CT sites, dressing reinforced.     Pt had a protracted episode of chugging ~0400 that required nearly 1,000 mL of volume (500 mL - 5%  Albumin to the patient + 500 mL of LR directly to the circuit) before both RVAD + LVAD flows stabilized and alarms for low flows resolved. Both Cards + CVTS at the bedside during this episode. Repeat CXR obtained, read still pending. No turns or activity preceded chugging episode.     Pt received the following totals over the shift:    PRBC - x5  FFP - x2  PLT x2  5% Albumin - 1,250 mL  LR - 1,250 mL  Protamine - 75 mg total  KCentra - 1,093 U (1/2 dose)    Bleeding/Anticoagulation:  Heparin remains off, ACT in the 130s - 140s for most of the shift. Discussed w/ Cards + CVTS re: heparin to circuit given development of clot/fibrin in oxygenator, plan to continue to hold heparin given  persistent CT output despite product replacement.    Volume Status:  UOP ~30 mL/hr. K+ slowly increasing, up to 5.6 at 0400 labs.      Intake/Output Summary (Last 24 hours) at 7/9/2022 0527  Last data filed at 7/9/2022 0500  Gross per 24 hour   Intake 93353.27 ml   Output 3455 ml   Net 9575.27 ml       Labs:    Recent Labs   Lab 07/09/22  0406 07/09/22  0208 07/09/22  0017 07/08/22  2215   PH 7.39  7.39 7.40  7.40 7.40 7.39   PCO2 41  41 40  40 41 40   PO2 170*  170* 182*  182* 185* 192*   HCO3 24  24 25  25 26 24   O2PER 40  60  40  40 40  40 40 40  40       Lab Results   Component Value Date    HGB 9.3 (L) 07/09/2022    PHGB 110 (H) 07/04/2022     (L) 07/09/2022    FIBR 286 07/09/2022    INR 1.22 (H) 07/09/2022    PTT 43 (H) 07/09/2022    DD 2.76 (H) 07/09/2022       Vent settings:  Vent Mode: CMV/AC  (Continuous Mandatory Ventilation/ Assist Control)  FiO2 (%): 40 %  Resp Rate (Set): 10 breaths/min  Tidal Volume (Set, mL): 450 mL  PEEP (cm H2O): 5 cmH2O  Resp: 10  .        Plan is to continue RVAD support, titrate sweep/flows as indicated..      Lisa Alvarez, RRT-ACCS / ECMO Specialist  7/9/2022 5:27 AM

## 2022-07-09 NOTE — ANESTHESIA PREPROCEDURE EVALUATION
Anesthesia Pre-Procedure Evaluation    Patient: Dandy Sands   MRN: [id number] : 1960        Procedure : Procedure(s):  INCISION AND DRAINAGE, WOUND, CHEST, WITH IRRIGATION WITH CHEST CLOSURE AND ANY ASSOCIATED PROCEDURES (N/A Chest)          No past medical history on file.   Past Surgical History:   Procedure Laterality Date     COLONOSCOPY N/A 2022    Procedure: COLONOSCOPY;  Surgeon: Parth Meneses MD;  Location: [facility]     CV CORONARY ANGIOGRAM N/A 2022    Procedure: Coronary Angiogram;  Surgeon: Mike Pope MD;  Location: [facility]     CV CORONARY ANGIOGRAM N/A 2022    Procedure: Coronary Angiogram;  Surgeon: Austin Bright MD;  Location: [facility]     CV CORONARY LITHOTRIPSY PCI Bilateral 2022    Procedure: Percutaneous Coronary Intervention - Lithotripsy;  Surgeon: Mike Pope MD;  Location: [facility]     CV INTRA AORTIC BALLOON N/A 2022    Procedure: Intraprocedure Aortic Balloon Pump Insertion;  Surgeon: Sherman Cerda MD;  Location: [facility]     CV INTRA AORTIC BALLOON Right 7/3/2022    Procedure: Reposition of Subclavian Intraprocedure Aortic Balloon Pump;  Surgeon: Austin Bright MD;  Location:  HEART CARDIAC CATH LAB     CV INTRAVASULAR ULTRASOUND N/A 2022    Procedure: Intravascular Ultrasound;  Surgeon: Mike Pope MD;  [facility]     CV PCI ANGIOPLASTY N/A 2022    Procedure: Percutaneous Transluminal Angioplasty;  Surgeon: Austin Bright MD;  [facility]     CV PCI STENT DRUG ELUTING N/A 2022    Procedure: Percutaneous Coronary Intervention Stent;  Surgeon: Mike Pope MD;  Location:  HEART CARDIAC CATH LAB     CV RIGHT HEART CATH MEASUREMENTS RECORDED N/A 2022    Procedure: Right Heart Catheterization;  Surgeon: Justo Stewart MD;  [facility]     CV  RIGHT HEART CATH MEASUREMENTS RECORDED N/A 5/24/2022    Procedure: Right Heart Catheterization;  Surgeon: Mike Pope MD;  Location:  HEART CARDIAC CATH LAB     CV RIGHT HEART CATH MEASUREMENTS RECORDED N/A 5/29/2022    Procedure: Right Heart Catheterization;  Surgeon: Austin Bright MD;  Location:  HEART CARDIAC CATH LAB     CV RIGHT HEART CATH MEASUREMENTS RECORDED N/A 7/1/2022    Procedure: Right Heart Catheterization;  Surgeon: Mike Pope MD;  Location:  HEART CARDIAC CATH LAB     CV RIGHT HEART EXERCISE STRESS STUDY N/A 05/18/2022    Procedure: Stress Drug Study;  Surgeon: Justo Stewart MD;  Location:  HEART CARDIAC CATH LAB     CV SWAN CHOCO PROCEDURE N/A 6/17/2022    Procedure: [place] Procedure;  Surgeon: Sherman Cerda MD;  Location:  HEART CARDIAC CATH LAB     INSERT INTRAAORTIC BALLOON PUMP Right 6/23/2022    Procedure: INSERTION, INTRA-AORTIC BALLOON PUMP RIGHT SUBCLAVIAN ARTERY.;  Surgeon: Hayden Veras MD;  Location: [facility]     PICC DOUBLE LUMEN PLACEMENT Right 05/28/2022    right basilic 5 fr dl picc 40 cm      No Known Allergies   Social History     Tobacco Use     Smoking status: Not on file     Smokeless tobacco: Not on file   Substance Use Topics     Alcohol use: Not on file      Wt Readings from Last 1 Encounters:   07/08/22 63 kg (138 lb 14.2 oz)        Anesthesia Evaluation   Pt has had prior anesthetic. Type: General and MAC.    No history of anesthetic complications       ROS/MED HX  ENT/Pulmonary: Comment: COVID 1/2022    (+) tobacco use, Past use, COPD (mild-moderate Emphysema ),     Neurologic:  - neg neurologic ROS     Cardiovascular: Comment: Ischemic cardiomyopathy with EF of 10-15%. Admitted for decompensated heart failure with multiorgan shock on 6/17/22. IABP 6/23/22. S/p LVAD placement (HeartMate 3) on 7/8/22   #S/p RVAD (29F Protek duo RIJ) on 7/8/22    Severe ostial LAD disease with in-stent restenosis of mid LAD at diag bifurcation, severe  proximal RCA disease, mild circ disease.    Dilated LV (LVIDd 6.1 cm)    Device Check 6/23/22    Device: Medtronic [id number] Evera XT DR  Normal device function  Mode: AAI<>DDD /115 bpm  AP: 0.1%  : <0.1%  Intrinsic rhythm: AS-VS 98 bpm  Thoracic Impedance: Well below the reference line suggesting moderate intrathoracic fluid accumulation.  Short V-V intervals: 0  Lead Trends Appear Stable.  Estimated battery longevity to RRT = 3.8-6.3 years.  Battery voltage = 2.96 V.   Atrial Arrhythmia: None  AF [place]: 0%  Anticoagulant: None  Ventricular Arrhythmia: None    (+) Dyslipidemia hypertension-Peripheral Vascular Disease-CAD -past MI (2007) -stent (2005)-CHF ( ACC/AHA Stage D; SCAI Stage C) etiology: ICM Last EF: 10-15% NYHA classification: IV. CASEY. orthopnea/PND, ICD ( ) Reason placed:Primary prevention .  type;Medtronic CRT-D Settings AAI<-> DDD /115 bpm dysrhythmias (SVT/ VT, paroxysmal atrial fibrillation now in NSR), a-fib, valvular problems/murmurs type: MR severe. pulmonary hypertension, Previous cardiac testing   Echo: Date: 5/17/22 Results:  Severe left ventricular dilation is present. Left ventricular function is decreased. The ejection fraction is 10-15% (severely reduced). Severe diffuse hypokinesis is present. Mild right ventricular dilation is present. Global right ventricular function is mildly reduced. Severe functional mitral insufficiency is present. Pulmonary hypertension is present. Estimated pulmonary artery systolic pressure is 44 mmHg plus right atrial pressure. Dilation of the inferior vena cava is present with abnormal respiratory variation in diameter. Mean RA pressure estiamted at 15 mmHg. No pericardial effusion is present.    Stress Test: Date: Results:    ECG Reviewed: Date: 5/18/22 Results:  Sinus rhythm with occasional PACs   Non-specific intra-ventricular conduction block   Cath: Date: Results:  RHC from 7/1/22:    Right sided filling pressures are normal.    Moderately  elevated pulmonary artery hypertension.    Left sided filling pressures are moderately elevated.    Reduced cardiac output level.    RA 6  RV 52/7  PA 53/26 (35)  PCWP 22    Marley 3.63, index 2.06, TD CO 3.87, index 2.19  PVR by TD CO 3.3 Right sided filling pressures are normal. Left sided filling pressures are moderately elevated. Moderately elevated pulmonary artery hypertension. Reduced cardiac output level.      1.  Moderate pulmonary hypertension  2.  Reduced cardiac output index 2.2 by TD CO and 2.06 by Marley while on IABP support  3.  Moderately elevated left-sided filling pressures, normal right-sided filling pressures    LHC from 5/27/22:    Two vessel coronary artery disease including prior PCI of the RCA and LAD. There are no new hemodynamically significant lesions.         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        Other:            Physical Exam    Airway        Mallampati: II   TM distance: > 3 FB   Neck ROM: full   Mouth opening: > 3 cm    Respiratory Devices and Support         Dental  no notable dental history     (+) caps      Cardiovascular          Rhythm and rate: normal   (+) murmur       Pulmonary   pulmonary exam normal        breath sounds clear to auscultation           OUTSIDE LABS:  CBC:   Lab Results   Component Value Date    WBC 5.3  07/09/2022    WBC 6.5 07/09/2022    HGB 9.6 (L) 07/09/2022    HGB 9.3 (L) 07/09/2022    HCT 28.1 (L) 07/09/2022    HCT 27.4 (L) 07/09/2022    PLT 87 (L) 07/09/2022     (L) 07/09/2022     BMP:   Lab Results   Component Value Date     07/09/2022     07/09/2022    POTASSIUM 4.8 07/09/2022    POTASSIUM 5.2 07/09/2022    CHLORIDE 105 07/09/2022    CHLORIDE 105 07/09/2022    CO2 23 07/09/2022    CO2 22 07/09/2022    BUN 15.4 07/09/2022    BUN 17.1 07/09/2022    CR 0.71 07/09/2022    CR 0.77 07/09/2022     (H) 07/09/2022     (H) 07/09/2022     COAGS:   Lab Results   Component Value Date    PTT 45 (H) 07/09/2022    INR 1.27 (H) 07/09/2022    FIBR 286 07/09/2022     POC: No results found for: BGM, HCG, HCGS  HEPATIC:   Lab Results   Component Value Date    ALBUMIN 2.9 (L) 07/09/2022    PROTTOTAL 4.4 (L) 07/08/2022    ALT 29 07/09/2022     (H) 07/08/2022    ALKPHOS 59 07/08/2022    BILITOTAL 1.0 07/08/2022     OTHER:   Lab Results   Component Value Date    PH 7.44 07/09/2022    LACT 1.3 07/09/2022    A1C 5.5 05/20/2022    ELDA 8.4 (L) 07/09/2022    PHOS 4.9 (H) 07/09/2022    MAG 2.0 07/09/2022    TSH 1.65 05/20/2022    CRP 28.0 (H) 06/19/2022    SED 39 (H) 06/19/2022       Anesthesia Plan    ASA Status:  4   NPO Status:  NPO Appropriate    Anesthesia Type: General.     - Airway: ETT   Induction: Intravenous.   Maintenance: Balanced.   Techniques and Equipment:     - Airway: Double lumen ETT     - Lines/Monitors: Arterial Line, Central Line, 2nd IV, PAC, CVP, TAVARES            TAVARES Absolute Contra-indication: NONE            TAVARES Relative Contra-indication: NONE     - Blood: Cell Saver, T&S     - Drips/Meds: Epinephrine, Vasopressin, Norepi, Nitric Oxide     Consents    Anesthesia Plan(s) and associated risks, benefits, and realistic alternatives discussed. Questions answered and patient/representative(s) expressed understanding.    - Discussed:     - Discussed with:  Patient      - Extended  Intubation/Ventilatory Support Discussed: Yes.      - Patient is DNR/DNI Status: No    Use of blood products discussed: Yes.     - Discussed with: Patient.     - Consented: consented to blood products            Reason for refusal: other.     Postoperative Care    Pain management: Multi-modal analgesia.   PONV prophylaxis: Ondansetron (or other 5HT-3), Dexamethasone or Solumedrol     Comments:    Other Comments:             Eliud Sotelo Junior, MD                                  Anesthesia Evaluation   Pt has had prior anesthetic.         ROS/MED HX  ENT/Pulmonary:       Neurologic:       Cardiovascular:       METS/Exercise Tolerance:     Hematologic:       Musculoskeletal:       GI/Hepatic:       Renal/Genitourinary:       Endo:       Psychiatric/Substance Use:       Infectious Disease:       Malignancy:       Other:            Physical Exam    Airway             Respiratory Devices and Support    ETT:      Dental           Cardiovascular          Rhythm and rate: regular and normal     Pulmonary                         Anesthesia Plan    ASA Status:  4   NPO Status:  NPO Appropriate    Anesthesia Type: General.              Consents    Anesthesia Plan(s) and associated risks, benefits, and realistic alternatives discussed. Questions answered and patient/representative(s) expressed understanding.    - Discussed:     - Discussed with:  Other (See Comment)         Postoperative Care       PONV prophylaxis: Ondansetron (or other 5HT-3)     Comments:    Other Comments: Patient with open chest s/p LVAD and protek duo placement. On inhaled NO. Plan today is to close the chest and remove the oxygenator on the protek duo. Discussed anesthesia plan with patients daughter.She voiced understanding and agreed to proceed as planned. Questions were sought and answered

## 2022-07-09 NOTE — PROVIDER NOTIFICATION
Notified MD  By [facility] phone at 1215AM regarding lab results - low ACT.      Spoke with: Dr. Freeman - CVTS re: ACT out of goal (135, with recheck of 143), ACT range ordered for 160 - 180.    Comments: VORB, will hold heparin at this time d/t concerns for bleeding, will readdress if signs of fibrin/clotting appear in ECMO circuit or components.

## 2022-07-09 NOTE — PROGRESS NOTES
X Cover    Notified by nursing staff that patient was having chugging on RVAD. Patient is still having significant output from chest tubes but still hemodynamically OK. Chugging goes away with volume. Discussed with surgery team that this is likely due to hypovolemia and recommended medical interventions to control bleeding--in the meantime will give blood products. Surgery team will give blood / platelets / plasma.    Pipo Hadley MD PhD

## 2022-07-09 NOTE — PHARMACY-VANCOMYCIN DOSING SERVICE
Pharmacy Vancomycin Initial Note  Date of Service 2022  Patient's  1960  61 year old, male    Indication: Surgical Prophylaxis and open chest prophylaxis, now closed     Current estimated CrCl = Estimated Creatinine Clearance: 98.6 mL/min (based on SCr of 0.71 mg/dL).    Creatinine for last 3 days  2022:  3:56 AM Creatinine 0.68 mg/dL  2022:  3:32 AM Creatinine 0.66 mg/dL;  4:43 PM Creatinine 0.73 mg/dL; 10:13 PM Creatinine 0.78 mg/dL  2022:  4:08 AM Creatinine 0.77 mg/dL;  9:52 AM Creatinine 0.71 mg/dL    Recent Vancomycin Level(s) for last 3 days  No results found for requested labs within last 72 hours.      Vancomycin IV Administrations (past 72 hours)                   vancomycin (VANCOCIN) 1000 mg in dextrose 5% 200 mL PREMIX (mg) 1,000 mg New Bag 22 0918     1,000 mg New Bag 22 2238    vancomycin (VANCOCIN) 1000 mg in dextrose 5% 200 mL PREMIX (mg) 1,000 mg Given 22 1005                Nephrotoxins and other renal medications (From now, onward)    Start     Dose/Rate Route Frequency Ordered Stop    22 1000  rifampin (RIFADIN) 600 mg in sodium chloride 0.9 % 100 mL intermittent infusion         600 mg Intravenous EVERY 24 HOURS 22 1642 22 0959    22 2200  vancomycin (VANCOCIN) 1000 mg in dextrose 5% 200 mL PREMIX         1,000 mg  200 mL/hr over 1 Hours Intravenous EVERY 12 HOURS 22 1642 07/10/22 2159    22 1700  vasopressin 1 unit/mL MAX Conc (PITRESSIN) infusion         0.5-4 Units/hr  0.5-4 mL/hr  Intravenous CONTINUOUS 22 1642      22 1700  norepinephrine (LEVOPHED) 16 mg in  mL infusion MAX CONC CENTRAL LINE         0.01-0.6 mcg/kg/min × 67.4 kg (Dosing Weight)  0.6-37.9 mL/hr  Intravenous CONTINUOUS 22 1648            Contrast Orders - past 72 hours (72h ago, onward)    None          InsightRX Prediction of Planned Initial Vancomycin Regimen  N/A - will evaluate in insights if continued         Plan:  1. Vancomycin was started postop, 1000 mg IV q12h, this was ordered as routine post-op prophylaxis, continuing with open chest.   2. Vancomycin monitoring method: AUC  3. Vancomycin therapeutic monitoring goal: 400-600 mg*h/L  4. Pharmacy will check vancomycin levels as appropriate in 1-3 Days.    5. Serum creatinine levels will be ordered daily for the first week of therapy and at least twice weekly for subsequent weeks.      Inna Rowland, SERGEYH

## 2022-07-09 NOTE — PROGRESS NOTES
CV ICU notified for platelet drop to 87. Order to hold on platelet transfusion for now in the setting of a VAD without active signs of bleeding.     KATHLEEN Davis RN

## 2022-07-10 ENCOUNTER — APPOINTMENT (OUTPATIENT)
Dept: GENERAL RADIOLOGY | Facility: CLINIC | Age: 62
DRG: 001 | End: 2022-07-10
Attending: STUDENT IN AN ORGANIZED HEALTH CARE EDUCATION/TRAINING PROGRAM
Payer: COMMERCIAL

## 2022-07-10 ENCOUNTER — APPOINTMENT (OUTPATIENT)
Dept: GENERAL RADIOLOGY | Facility: CLINIC | Age: 62
DRG: 001 | End: 2022-07-10
Attending: THORACIC SURGERY (CARDIOTHORACIC VASCULAR SURGERY)
Payer: COMMERCIAL

## 2022-07-10 LAB
ABO/RH(D): NORMAL
ACT BLD: 135 SECONDS (ref 74–150)
ACT BLD: 139 SECONDS (ref 74–150)
ACT BLD: 147 SECONDS (ref 74–150)
ACT BLD: 152 SECONDS (ref 74–150)
ACT BLD: 156 SECONDS (ref 74–150)
ACT BLD: 160 SECONDS (ref 74–150)
ACT BLD: 160 SECONDS (ref 74–150)
ACT BLD: 165 SECONDS (ref 74–150)
ACT BLD: 177 SECONDS (ref 74–150)
ALBUMIN SERPL BCG-MCNC: 3.1 G/DL (ref 3.5–5.2)
ALT SERPL W P-5'-P-CCNC: 21 U/L (ref 10–50)
ANION GAP SERPL CALCULATED.3IONS-SCNC: 10 MMOL/L (ref 7–15)
ANION GAP SERPL CALCULATED.3IONS-SCNC: 10 MMOL/L (ref 7–15)
ANION GAP SERPL CALCULATED.3IONS-SCNC: 8 MMOL/L (ref 7–15)
ANION GAP SERPL CALCULATED.3IONS-SCNC: 8 MMOL/L (ref 7–15)
ANTIBODY SCREEN: NEGATIVE
APTT PPP: 52 SECONDS (ref 22–38)
APTT PPP: 59 SECONDS (ref 22–38)
APTT PPP: 60 SECONDS (ref 22–38)
APTT PPP: 84 SECONDS (ref 22–38)
AT III ACT/NOR PPP CHRO: 67 % (ref 85–135)
BASE EXCESS BLDA CALC-SCNC: -0.3 MMOL/L (ref -9–1.8)
BASE EXCESS BLDA CALC-SCNC: -0.4 MMOL/L (ref -9–1.8)
BASE EXCESS BLDA CALC-SCNC: -0.5 MMOL/L (ref -9–1.8)
BASE EXCESS BLDA CALC-SCNC: -0.6 MMOL/L (ref -9–1.8)
BASE EXCESS BLDA CALC-SCNC: -0.7 MMOL/L (ref -9–1.8)
BASE EXCESS BLDA CALC-SCNC: -0.8 MMOL/L (ref -9–1.8)
BASE EXCESS BLDA CALC-SCNC: 0.1 MMOL/L (ref -9–1.8)
BASE EXCESS BLDV CALC-SCNC: 0.6 MMOL/L (ref -7.7–1.9)
BASOPHILS # BLD AUTO: 0 10E3/UL (ref 0–0.2)
BASOPHILS # BLD AUTO: 0.1 10E3/UL (ref 0–0.2)
BASOPHILS NFR BLD AUTO: 1 %
BUN SERPL-MCNC: 13.8 MG/DL (ref 8–23)
BUN SERPL-MCNC: 14.4 MG/DL (ref 8–23)
BUN SERPL-MCNC: 14.5 MG/DL (ref 8–23)
BUN SERPL-MCNC: 15 MG/DL (ref 8–23)
CA-I BLD-MCNC: 4.6 MG/DL (ref 4.4–5.2)
CA-I BLD-MCNC: 4.7 MG/DL (ref 4.4–5.2)
CALCIUM SERPL-MCNC: 8.1 MG/DL (ref 8.8–10.2)
CALCIUM SERPL-MCNC: 8.2 MG/DL (ref 8.8–10.2)
CALCIUM SERPL-MCNC: 8.3 MG/DL (ref 8.8–10.2)
CALCIUM SERPL-MCNC: 8.3 MG/DL (ref 8.8–10.2)
CHLORIDE SERPL-SCNC: 102 MMOL/L (ref 98–107)
CHLORIDE SERPL-SCNC: 103 MMOL/L (ref 98–107)
CREAT SERPL-MCNC: 0.53 MG/DL (ref 0.67–1.17)
CREAT SERPL-MCNC: 0.53 MG/DL (ref 0.67–1.17)
CREAT SERPL-MCNC: 0.59 MG/DL (ref 0.67–1.17)
CREAT SERPL-MCNC: 0.62 MG/DL (ref 0.67–1.17)
D DIMER PPP FEU-MCNC: 1.63 UG/ML FEU (ref 0–0.5)
D DIMER PPP FEU-MCNC: 1.99 UG/ML FEU (ref 0–0.5)
DEPRECATED HCO3 PLAS-SCNC: 20 MMOL/L (ref 22–29)
DEPRECATED HCO3 PLAS-SCNC: 20 MMOL/L (ref 22–29)
DEPRECATED HCO3 PLAS-SCNC: 21 MMOL/L (ref 22–29)
DEPRECATED HCO3 PLAS-SCNC: 21 MMOL/L (ref 22–29)
EOSINOPHIL # BLD AUTO: 0.3 10E3/UL (ref 0–0.7)
EOSINOPHIL # BLD AUTO: 0.4 10E3/UL (ref 0–0.7)
EOSINOPHIL NFR BLD AUTO: 4 %
EOSINOPHIL NFR BLD AUTO: 4 %
EOSINOPHIL NFR BLD AUTO: 5 %
EOSINOPHIL NFR BLD AUTO: 5 %
ERYTHROCYTE [DISTWIDTH] IN BLOOD BY AUTOMATED COUNT: 18.2 % (ref 10–15)
ERYTHROCYTE [DISTWIDTH] IN BLOOD BY AUTOMATED COUNT: 18.3 % (ref 10–15)
ERYTHROCYTE [DISTWIDTH] IN BLOOD BY AUTOMATED COUNT: 18.4 % (ref 10–15)
ERYTHROCYTE [DISTWIDTH] IN BLOOD BY AUTOMATED COUNT: 18.5 % (ref 10–15)
FIBRINOGEN PPP-MCNC: 331 MG/DL (ref 170–490)
FIBRINOGEN PPP-MCNC: 365 MG/DL (ref 170–490)
GFR SERPL CREATININE-BSD FRML MDRD: >90 ML/MIN/1.73M2
GLUCOSE BLDC GLUCOMTR-MCNC: 101 MG/DL (ref 70–99)
GLUCOSE BLDC GLUCOMTR-MCNC: 101 MG/DL (ref 70–99)
GLUCOSE BLDC GLUCOMTR-MCNC: 102 MG/DL (ref 70–99)
GLUCOSE BLDC GLUCOMTR-MCNC: 104 MG/DL (ref 70–99)
GLUCOSE BLDC GLUCOMTR-MCNC: 104 MG/DL (ref 70–99)
GLUCOSE BLDC GLUCOMTR-MCNC: 105 MG/DL (ref 70–99)
GLUCOSE BLDC GLUCOMTR-MCNC: 107 MG/DL (ref 70–99)
GLUCOSE BLDC GLUCOMTR-MCNC: 108 MG/DL (ref 70–99)
GLUCOSE BLDC GLUCOMTR-MCNC: 110 MG/DL (ref 70–99)
GLUCOSE BLDC GLUCOMTR-MCNC: 112 MG/DL (ref 70–99)
GLUCOSE BLDC GLUCOMTR-MCNC: 133 MG/DL (ref 70–99)
GLUCOSE SERPL-MCNC: 105 MG/DL (ref 70–99)
GLUCOSE SERPL-MCNC: 105 MG/DL (ref 70–99)
GLUCOSE SERPL-MCNC: 106 MG/DL (ref 70–99)
GLUCOSE SERPL-MCNC: 98 MG/DL (ref 70–99)
HCO3 BLD-SCNC: 23 MMOL/L (ref 21–28)
HCO3 BLD-SCNC: 24 MMOL/L (ref 21–28)
HCO3 BLDV-SCNC: 26 MMOL/L (ref 21–28)
HCT VFR BLD AUTO: 27.6 % (ref 40–53)
HCT VFR BLD AUTO: 27.9 % (ref 40–53)
HCT VFR BLD AUTO: 28.1 % (ref 40–53)
HCT VFR BLD AUTO: 28.9 % (ref 40–53)
HGB BLD-MCNC: 9.4 G/DL (ref 13.3–17.7)
HGB BLD-MCNC: 9.4 G/DL (ref 13.3–17.7)
HGB BLD-MCNC: 9.6 G/DL (ref 13.3–17.7)
HGB BLD-MCNC: 9.9 G/DL (ref 13.3–17.7)
HGB FREE PLAS-MCNC: 100 MG/DL
IMM GRANULOCYTES # BLD: 0 10E3/UL
IMM GRANULOCYTES # BLD: 0.1 10E3/UL
IMM GRANULOCYTES NFR BLD: 0 %
IMM GRANULOCYTES NFR BLD: 1 %
INR PPP: 1.24 (ref 0.85–1.15)
INR PPP: 1.27 (ref 0.85–1.15)
INR PPP: 1.28 (ref 0.85–1.15)
INR PPP: 1.28 (ref 0.85–1.15)
LACTATE SERPL-SCNC: 0.7 MMOL/L (ref 0.7–2)
LACTATE SERPL-SCNC: 0.7 MMOL/L (ref 0.7–2)
LACTATE SERPL-SCNC: 0.8 MMOL/L (ref 0.7–2)
LACTATE SERPL-SCNC: 0.8 MMOL/L (ref 0.7–2)
LACTATE SERPL-SCNC: 0.9 MMOL/L (ref 0.7–2)
LACTATE SERPL-SCNC: 0.9 MMOL/L (ref 0.7–2)
LACTATE SERPL-SCNC: 1.1 MMOL/L (ref 0.7–2)
LYMPHOCYTES # BLD AUTO: 0.4 10E3/UL (ref 0.8–5.3)
LYMPHOCYTES # BLD AUTO: 0.6 10E3/UL (ref 0.8–5.3)
LYMPHOCYTES NFR BLD AUTO: 4 %
LYMPHOCYTES NFR BLD AUTO: 6 %
LYMPHOCYTES NFR BLD AUTO: 6 %
LYMPHOCYTES NFR BLD AUTO: 7 %
MAGNESIUM SERPL-MCNC: 2 MG/DL (ref 1.7–2.3)
MCH RBC QN AUTO: 28.7 PG (ref 26.5–33)
MCH RBC QN AUTO: 29 PG (ref 26.5–33)
MCH RBC QN AUTO: 29 PG (ref 26.5–33)
MCH RBC QN AUTO: 29.3 PG (ref 26.5–33)
MCHC RBC AUTO-ENTMCNC: 33.7 G/DL (ref 31.5–36.5)
MCHC RBC AUTO-ENTMCNC: 34.1 G/DL (ref 31.5–36.5)
MCHC RBC AUTO-ENTMCNC: 34.2 G/DL (ref 31.5–36.5)
MCHC RBC AUTO-ENTMCNC: 34.3 G/DL (ref 31.5–36.5)
MCV RBC AUTO: 85 FL (ref 78–100)
MCV RBC AUTO: 86 FL (ref 78–100)
MONOCYTES # BLD AUTO: 0.6 10E3/UL (ref 0–1.3)
MONOCYTES # BLD AUTO: 0.6 10E3/UL (ref 0–1.3)
MONOCYTES # BLD AUTO: 0.7 10E3/UL (ref 0–1.3)
MONOCYTES # BLD AUTO: 0.7 10E3/UL (ref 0–1.3)
MONOCYTES NFR BLD AUTO: 7 %
MONOCYTES NFR BLD AUTO: 8 %
NEUTROPHILS # BLD AUTO: 6.5 10E3/UL (ref 1.6–8.3)
NEUTROPHILS # BLD AUTO: 7.3 10E3/UL (ref 1.6–8.3)
NEUTROPHILS # BLD AUTO: 7.4 10E3/UL (ref 1.6–8.3)
NEUTROPHILS # BLD AUTO: 7.8 10E3/UL (ref 1.6–8.3)
NEUTROPHILS NFR BLD AUTO: 78 %
NEUTROPHILS NFR BLD AUTO: 80 %
NEUTROPHILS NFR BLD AUTO: 81 %
NEUTROPHILS NFR BLD AUTO: 82 %
NRBC # BLD AUTO: 0 10E3/UL
NRBC BLD AUTO-RTO: 0 /100
O2/TOTAL GAS SETTING VFR VENT: 40 %
O2/TOTAL GAS SETTING VFR VENT: 50 %
O2/TOTAL GAS SETTING VFR VENT: 50 %
OXYHGB MFR BLD: 97 % (ref 92–100)
OXYHGB MFR BLD: 97 % (ref 92–100)
OXYHGB MFR BLD: 98 % (ref 92–100)
OXYHGB MFR BLD: 99 % (ref 92–100)
OXYHGB MFR BLDV: 63 %
PCO2 BLD: 32 MM HG (ref 35–45)
PCO2 BLD: 34 MM HG (ref 35–45)
PCO2 BLD: 35 MM HG (ref 35–45)
PCO2 BLD: 36 MM HG (ref 35–45)
PCO2 BLDV: 42 MM HG (ref 40–50)
PH BLD: 7.42 [PH] (ref 7.35–7.45)
PH BLD: 7.43 [PH] (ref 7.35–7.45)
PH BLD: 7.44 [PH] (ref 7.35–7.45)
PH BLD: 7.45 [PH] (ref 7.35–7.45)
PH BLD: 7.46 [PH] (ref 7.35–7.45)
PH BLDV: 7.39 [PH] (ref 7.32–7.43)
PHOSPHATE SERPL-MCNC: 4.1 MG/DL (ref 2.5–4.5)
PLATELET # BLD AUTO: 68 10E3/UL (ref 150–450)
PLATELET # BLD AUTO: 74 10E3/UL (ref 150–450)
PLATELET # BLD AUTO: 78 10E3/UL (ref 150–450)
PLATELET # BLD AUTO: 82 10E3/UL (ref 150–450)
PO2 BLD: 174 MM HG (ref 80–105)
PO2 BLD: 174 MM HG (ref 80–105)
PO2 BLD: 180 MM HG (ref 80–105)
PO2 BLD: 182 MM HG (ref 80–105)
PO2 BLD: 182 MM HG (ref 80–105)
PO2 BLD: 184 MM HG (ref 80–105)
PO2 BLD: 201 MM HG (ref 80–105)
PO2 BLDV: 34 MM HG (ref 25–47)
POTASSIUM SERPL-SCNC: 3.7 MMOL/L (ref 3.4–5.3)
POTASSIUM SERPL-SCNC: 3.9 MMOL/L (ref 3.4–5.3)
POTASSIUM SERPL-SCNC: 3.9 MMOL/L (ref 3.4–5.3)
POTASSIUM SERPL-SCNC: 4.1 MMOL/L (ref 3.4–5.3)
RBC # BLD AUTO: 3.21 10E6/UL (ref 4.4–5.9)
RBC # BLD AUTO: 3.28 10E6/UL (ref 4.4–5.9)
RBC # BLD AUTO: 3.31 10E6/UL (ref 4.4–5.9)
RBC # BLD AUTO: 3.41 10E6/UL (ref 4.4–5.9)
SODIUM SERPL-SCNC: 131 MMOL/L (ref 136–145)
SODIUM SERPL-SCNC: 131 MMOL/L (ref 136–145)
SODIUM SERPL-SCNC: 132 MMOL/L (ref 136–145)
SODIUM SERPL-SCNC: 133 MMOL/L (ref 136–145)
SPECIMEN EXPIRATION DATE: NORMAL
UFH PPP CHRO-ACNC: <0.1 IU/ML
VANCOMYCIN SERPL-MCNC: 12.8 UG/ML
WBC # BLD AUTO: 8.2 10E3/UL (ref 4–11)
WBC # BLD AUTO: 9 10E3/UL (ref 4–11)
WBC # BLD AUTO: 9.1 10E3/UL (ref 4–11)
WBC # BLD AUTO: 9.4 10E3/UL (ref 4–11)

## 2022-07-10 PROCEDURE — 71045 X-RAY EXAM CHEST 1 VIEW: CPT | Mod: 26 | Performed by: RADIOLOGY

## 2022-07-10 PROCEDURE — 84100 ASSAY OF PHOSPHORUS: CPT | Performed by: STUDENT IN AN ORGANIZED HEALTH CARE EDUCATION/TRAINING PROGRAM

## 2022-07-10 PROCEDURE — 36415 COLL VENOUS BLD VENIPUNCTURE: CPT | Performed by: STUDENT IN AN ORGANIZED HEALTH CARE EDUCATION/TRAINING PROGRAM

## 2022-07-10 PROCEDURE — 85347 COAGULATION TIME ACTIVATED: CPT

## 2022-07-10 PROCEDURE — 85384 FIBRINOGEN ACTIVITY: CPT | Performed by: STUDENT IN AN ORGANIZED HEALTH CARE EDUCATION/TRAINING PROGRAM

## 2022-07-10 PROCEDURE — 200N000002 HC R&B ICU UMMC

## 2022-07-10 PROCEDURE — 83605 ASSAY OF LACTIC ACID: CPT | Performed by: STUDENT IN AN ORGANIZED HEALTH CARE EDUCATION/TRAINING PROGRAM

## 2022-07-10 PROCEDURE — 999N000203 HC STATISTICAL VASC ACCESS NURSE TIME, 16-31 MINUTES

## 2022-07-10 PROCEDURE — 250N000011 HC RX IP 250 OP 636: Performed by: STUDENT IN AN ORGANIZED HEALTH CARE EDUCATION/TRAINING PROGRAM

## 2022-07-10 PROCEDURE — 82805 BLOOD GASES W/O2 SATURATION: CPT | Performed by: STUDENT IN AN ORGANIZED HEALTH CARE EDUCATION/TRAINING PROGRAM

## 2022-07-10 PROCEDURE — 85730 THROMBOPLASTIN TIME PARTIAL: CPT | Performed by: STUDENT IN AN ORGANIZED HEALTH CARE EDUCATION/TRAINING PROGRAM

## 2022-07-10 PROCEDURE — 82040 ASSAY OF SERUM ALBUMIN: CPT | Performed by: STUDENT IN AN ORGANIZED HEALTH CARE EDUCATION/TRAINING PROGRAM

## 2022-07-10 PROCEDURE — 258N000003 HC RX IP 258 OP 636: Performed by: STUDENT IN AN ORGANIZED HEALTH CARE EDUCATION/TRAINING PROGRAM

## 2022-07-10 PROCEDURE — 93005 ELECTROCARDIOGRAM TRACING: CPT

## 2022-07-10 PROCEDURE — 86901 BLOOD TYPING SEROLOGIC RH(D): CPT | Performed by: THORACIC SURGERY (CARDIOTHORACIC VASCULAR SURGERY)

## 2022-07-10 PROCEDURE — 82330 ASSAY OF CALCIUM: CPT | Performed by: STUDENT IN AN ORGANIZED HEALTH CARE EDUCATION/TRAINING PROGRAM

## 2022-07-10 PROCEDURE — 999N000015 HC STATISTIC ARTERIAL MONITORING DAILY

## 2022-07-10 PROCEDURE — 82310 ASSAY OF CALCIUM: CPT | Performed by: STUDENT IN AN ORGANIZED HEALTH CARE EDUCATION/TRAINING PROGRAM

## 2022-07-10 PROCEDURE — 258N000003 HC RX IP 258 OP 636: Performed by: THORACIC SURGERY (CARDIOTHORACIC VASCULAR SURGERY)

## 2022-07-10 PROCEDURE — 86923 COMPATIBILITY TEST ELECTRIC: CPT | Performed by: INTERNAL MEDICINE

## 2022-07-10 PROCEDURE — 99233 SBSQ HOSP IP/OBS HIGH 50: CPT | Mod: GC | Performed by: INTERNAL MEDICINE

## 2022-07-10 PROCEDURE — 83735 ASSAY OF MAGNESIUM: CPT | Performed by: STUDENT IN AN ORGANIZED HEALTH CARE EDUCATION/TRAINING PROGRAM

## 2022-07-10 PROCEDURE — 83051 HEMOGLOBIN PLASMA: CPT | Performed by: STUDENT IN AN ORGANIZED HEALTH CARE EDUCATION/TRAINING PROGRAM

## 2022-07-10 PROCEDURE — 87040 BLOOD CULTURE FOR BACTERIA: CPT | Performed by: STUDENT IN AN ORGANIZED HEALTH CARE EDUCATION/TRAINING PROGRAM

## 2022-07-10 PROCEDURE — 85520 HEPARIN ASSAY: CPT | Performed by: STUDENT IN AN ORGANIZED HEALTH CARE EDUCATION/TRAINING PROGRAM

## 2022-07-10 PROCEDURE — 80202 ASSAY OF VANCOMYCIN: CPT | Performed by: THORACIC SURGERY (CARDIOTHORACIC VASCULAR SURGERY)

## 2022-07-10 PROCEDURE — 250N000013 HC RX MED GY IP 250 OP 250 PS 637: Performed by: STUDENT IN AN ORGANIZED HEALTH CARE EDUCATION/TRAINING PROGRAM

## 2022-07-10 PROCEDURE — 94003 VENT MGMT INPAT SUBQ DAY: CPT

## 2022-07-10 PROCEDURE — 85610 PROTHROMBIN TIME: CPT | Performed by: STUDENT IN AN ORGANIZED HEALTH CARE EDUCATION/TRAINING PROGRAM

## 2022-07-10 PROCEDURE — 93010 ELECTROCARDIOGRAM REPORT: CPT | Performed by: INTERNAL MEDICINE

## 2022-07-10 PROCEDURE — 999N000065 XR CHEST PORT 1 VIEW

## 2022-07-10 PROCEDURE — 86923 COMPATIBILITY TEST ELECTRIC: CPT | Performed by: SURGERY

## 2022-07-10 PROCEDURE — 84460 ALANINE AMINO (ALT) (SGPT): CPT | Performed by: STUDENT IN AN ORGANIZED HEALTH CARE EDUCATION/TRAINING PROGRAM

## 2022-07-10 PROCEDURE — 86923 COMPATIBILITY TEST ELECTRIC: CPT | Performed by: STUDENT IN AN ORGANIZED HEALTH CARE EDUCATION/TRAINING PROGRAM

## 2022-07-10 PROCEDURE — 86850 RBC ANTIBODY SCREEN: CPT | Performed by: THORACIC SURGERY (CARDIOTHORACIC VASCULAR SURGERY)

## 2022-07-10 PROCEDURE — 99291 CRITICAL CARE FIRST HOUR: CPT | Mod: 24 | Performed by: STUDENT IN AN ORGANIZED HEALTH CARE EDUCATION/TRAINING PROGRAM

## 2022-07-10 PROCEDURE — 85379 FIBRIN DEGRADATION QUANT: CPT | Performed by: STUDENT IN AN ORGANIZED HEALTH CARE EDUCATION/TRAINING PROGRAM

## 2022-07-10 PROCEDURE — 71045 X-RAY EXAM CHEST 1 VIEW: CPT

## 2022-07-10 PROCEDURE — 250N000011 HC RX IP 250 OP 636: Performed by: THORACIC SURGERY (CARDIOTHORACIC VASCULAR SURGERY)

## 2022-07-10 PROCEDURE — 94799 UNLISTED PULMONARY SVC/PX: CPT

## 2022-07-10 PROCEDURE — 33948 ECMO/ECLS DAILY MGMT-VENOUS: CPT

## 2022-07-10 PROCEDURE — 85025 COMPLETE CBC W/AUTO DIFF WBC: CPT | Performed by: THORACIC SURGERY (CARDIOTHORACIC VASCULAR SURGERY)

## 2022-07-10 PROCEDURE — 85300 ANTITHROMBIN III ACTIVITY: CPT | Performed by: STUDENT IN AN ORGANIZED HEALTH CARE EDUCATION/TRAINING PROGRAM

## 2022-07-10 PROCEDURE — 999N000157 HC STATISTIC RCP TIME EA 10 MIN

## 2022-07-10 PROCEDURE — 82803 BLOOD GASES ANY COMBINATION: CPT | Performed by: THORACIC SURGERY (CARDIOTHORACIC VASCULAR SURGERY)

## 2022-07-10 RX ORDER — HYDRALAZINE HYDROCHLORIDE 10 MG/1
20 TABLET, FILM COATED ORAL EVERY 8 HOURS SCHEDULED
Status: DISCONTINUED | OUTPATIENT
Start: 2022-07-10 | End: 2022-07-12

## 2022-07-10 RX ORDER — DULOXETIN HYDROCHLORIDE 30 MG/1
30 CAPSULE, DELAYED RELEASE ORAL DAILY
Status: DISCONTINUED | OUTPATIENT
Start: 2022-07-10 | End: 2022-08-02

## 2022-07-10 RX ORDER — DEXTROSE MONOHYDRATE 100 MG/ML
INJECTION, SOLUTION INTRAVENOUS CONTINUOUS PRN
Status: DISCONTINUED | OUTPATIENT
Start: 2022-07-10 | End: 2022-08-21 | Stop reason: HOSPADM

## 2022-07-10 RX ORDER — FLUTICASONE PROPIONATE AND SALMETEROL XINAFOATE 115; 21 UG/1; UG/1
2 AEROSOL, METERED RESPIRATORY (INHALATION) 2 TIMES DAILY
Status: DISPENSED | OUTPATIENT
Start: 2022-07-10 | End: 2022-08-02

## 2022-07-10 RX ORDER — HEPARIN SODIUM 10000 [USP'U]/100ML
300 INJECTION, SOLUTION INTRAVENOUS CONTINUOUS
Status: DISCONTINUED | OUTPATIENT
Start: 2022-07-10 | End: 2022-07-13

## 2022-07-10 RX ORDER — GUAIFENESIN 600 MG/1
15 TABLET, EXTENDED RELEASE ORAL DAILY
Status: DISCONTINUED | OUTPATIENT
Start: 2022-07-10 | End: 2022-08-08

## 2022-07-10 RX ADMIN — VANCOMYCIN HYDROCHLORIDE 1000 MG: 1 INJECTION, SOLUTION INTRAVENOUS at 10:39

## 2022-07-10 RX ADMIN — HEPARIN SODIUM AND DEXTROSE 400 UNITS/HR: 10000; 5 INJECTION INTRAVENOUS at 11:37

## 2022-07-10 RX ADMIN — PROPOFOL 55 MCG/KG/MIN: 10 INJECTION, EMULSION INTRAVENOUS at 03:03

## 2022-07-10 RX ADMIN — SENNOSIDES AND DOCUSATE SODIUM 1 TABLET: 8.6; 5 TABLET ORAL at 07:35

## 2022-07-10 RX ADMIN — FLUTICASONE PROPIONATE AND SALMETEROL XINAFOATE 2 PUFF: 115; 21 AEROSOL, METERED RESPIRATORY (INHALATION) at 21:42

## 2022-07-10 RX ADMIN — VANCOMYCIN HYDROCHLORIDE 1250 MG: 10 INJECTION, POWDER, LYOPHILIZED, FOR SOLUTION INTRAVENOUS at 22:04

## 2022-07-10 RX ADMIN — MULTIVITAMIN 15 ML: LIQUID ORAL at 16:01

## 2022-07-10 RX ADMIN — ACETAMINOPHEN 975 MG: 325 TABLET, FILM COATED ORAL at 23:48

## 2022-07-10 RX ADMIN — CEFEPIME HYDROCHLORIDE 2 G: 2 INJECTION, POWDER, FOR SOLUTION INTRAVENOUS at 18:19

## 2022-07-10 RX ADMIN — ACETAMINOPHEN 975 MG: 325 TABLET, FILM COATED ORAL at 15:00

## 2022-07-10 RX ADMIN — PROPOFOL 55 MCG/KG/MIN: 10 INJECTION, EMULSION INTRAVENOUS at 07:51

## 2022-07-10 RX ADMIN — HEPARIN SODIUM 5000 UNITS: 5000 INJECTION, SOLUTION INTRAVENOUS; SUBCUTANEOUS at 04:08

## 2022-07-10 RX ADMIN — CEFEPIME HYDROCHLORIDE 2 G: 2 INJECTION, POWDER, FOR SOLUTION INTRAVENOUS at 09:23

## 2022-07-10 RX ADMIN — DULOXETINE 30 MG: 30 CAPSULE, DELAYED RELEASE ORAL at 15:00

## 2022-07-10 RX ADMIN — Medication 40 MG: at 07:35

## 2022-07-10 RX ADMIN — ACETAMINOPHEN 975 MG: 325 TABLET, FILM COATED ORAL at 07:35

## 2022-07-10 RX ADMIN — POLYETHYLENE GLYCOL 3350 17 G: 17 POWDER, FOR SOLUTION ORAL at 07:35

## 2022-07-10 RX ADMIN — HYDRALAZINE HYDROCHLORIDE 20 MG: 10 TABLET, FILM COATED ORAL at 22:04

## 2022-07-10 RX ADMIN — FLUCONAZOLE IN SODIUM CHLORIDE 200 MG: 2 INJECTION, SOLUTION INTRAVENOUS at 09:37

## 2022-07-10 RX ADMIN — Medication 150 MCG/HR: at 12:47

## 2022-07-10 RX ADMIN — CEFEPIME HYDROCHLORIDE 2 G: 2 INJECTION, POWDER, FOR SOLUTION INTRAVENOUS at 02:56

## 2022-07-10 RX ADMIN — PROPOFOL 60 MCG/KG/MIN: 10 INJECTION, EMULSION INTRAVENOUS at 16:01

## 2022-07-10 RX ADMIN — PROPOFOL 60 MCG/KG/MIN: 10 INJECTION, EMULSION INTRAVENOUS at 11:52

## 2022-07-10 RX ADMIN — SENNOSIDES AND DOCUSATE SODIUM 1 TABLET: 8.6; 5 TABLET ORAL at 19:50

## 2022-07-10 RX ADMIN — RIFAMPIN 600 MG: 600 INJECTION, POWDER, LYOPHILIZED, FOR SOLUTION INTRAVENOUS at 09:37

## 2022-07-10 RX ADMIN — HYDRALAZINE HYDROCHLORIDE 20 MG: 10 TABLET, FILM COATED ORAL at 15:00

## 2022-07-10 RX ADMIN — MUPIROCIN 1 G: 20 OINTMENT TOPICAL at 07:36

## 2022-07-10 ASSESSMENT — ACTIVITIES OF DAILY LIVING (ADL)
ADLS_ACUITY_SCORE: 34
ADLS_ACUITY_SCORE: 34
ADLS_ACUITY_SCORE: 38
ADLS_ACUITY_SCORE: 34
ADLS_ACUITY_SCORE: 38
ADLS_ACUITY_SCORE: 34

## 2022-07-10 NOTE — PLAN OF CARE
Goal Outcome Evaluation:    Plan of Care Reviewed With: patient     Overall Patient Progress: improving    Major Shift Events:  Pt had some episodes of chugging at beginning of night. 250 mL albumin, 250 mL LR and 1 unit PRBC's. Chugging resolved and did not repeat the rest of the shift. VAD PI's increased to 7's with flows lower in the 2's. Sedation increased, PI's lowered and flows increased to mid 3's.  Pt was in and out of rate controlled  AFib with 2 episodes of  RVR. Pt self converted. MAPS and flows remained stable throughout. Team notified about rhythm changes. Pt CT outputs  ranging from 30-60mL/hr TTL. UOP between 20-35mL/hr. Team aware.  Plan: Continue Plan of Care. Remove oxygenator from circuit. Wean sedation as able.   For vital signs and complete assessments, please see documentation flowsheets.

## 2022-07-10 NOTE — PROGRESS NOTES
Nutrition Brief - MD consult: Registered Dietitian to order TF per Medical Nutrition Therapy     Chart reviewed: patient with  Chronic CHF and new LVAD placement on ( 7/8), open chest     Nutrition:  Diet: NPO/ intubated/ sedated  EN: access OG tube. Plan for trickle feeds today until patient is able to get post pyloric feed tomorrow.   - Per MD note, Given we are unable to get an NJT per cardiac surgery we can not begin full tube feeds but we will start trophic feeds at this time.   GI: on bowel regimen       Interventions:  1. Enteral Nutrition support recommendation:   - Access: OG tube   - Dosing wt: 67 kg actual wt     - TF: Ordered trickle tube feeds with Osmolite 1.5 @ 15 ml/hr trickle feeds.      - Free water flushes: 30 ml Q4 hrs for tube patency.    - Multivitamin/mineral: Certavite 15 ml/day via FT.   - Gastric enteral access: HOB > 30 degrees      Poli Eaton RD/JENARO  4E (CVICU) RD pager: [phone]  Ascom: [id number]  Weekend/Holiday RD pager: [phone]

## 2022-07-10 NOTE — PROGRESS NOTES
[facility]    Cardiology Progress Note- Cards 2        Date of Admission:  6/17/2022     Assessment & Plan: HVSL   Dandy EDUARD Sands is a 61 year old male with PMH of lupus, antiphospholipid syndrome, HTN, HLD, HFrEF 2/2 ICM who presented with cardiogenic shock c/b multiorgan failure (JOSE, shock liver) requiring mechanical support with IABP, now s/p HM3 LVAD 7/8/22 by Dr. Zamora with intraoperative course c/b RV failure s/p 29F Protek duo via RIJ    #HFrEF 2/2 ICM s/p HM3 LVAD in setting of cardiogenic shock (SCAI D)  #RV failure s/p Protek duo temporary RVAD  Patient with ICM s/p HM3 LVAD 7/8/22 by Dr. Zamora in setting of presentation for cardiogenic shock requiring MCS with IABP. Intraoperative course c/b RV failure resulting in cardiogenic shock requiring high dose pressors necessitating RVAD support. Pressors able to be weaned and overall end organ function appears intact at present time. Coagulopathy now corrected with transfusion support. Overall current plan of care to continue to target euvolemia with plan for washout and closure in OR tomorrow. Then will plan to proceed towards weaning his oxygen requirements and working towards exubation. He currently appears to have rather minimal systemic volume which we will use diuretics as needed to maintain this. RVAD/LVAD appear to have balanced flows given appearance chest XR and no alarms for either circuit. Given this, would favor discontinuation of dobutamine as unsure if providing much benefit given his MCS. Will continue to monitor his hemodynamic status closely with discontinuation to ensure RVAD/LVAD flows   -continue transfusion support as needed to correct coagulopathy; tentative plan per surgical team for washout and closure tomorrow  -stop dobutamine  -continue LVAD at current speed  -continue broad spectrum abx for open chest  -will start hydralazine 20mg Q8H for elevated MAPs to assist with flows  (hold parameters for MAP<80 placed)      The patient's care was discussed with the Attending Physician, Dr. Askew, Bedside Nurse and Patient.    Emelyn Meneses MD  [facility]    ______________________________________________________________________    Interval History   Nursing notes reviewed. Some chugging early in shift with resolution after IVF and 1U PRBC. Chest tube output overall stable. A few runs of afib with occasional RVR noted as well. No other noted concerns    Data reviewed today: I reviewed all medications, new labs and imaging results over the last 24 hours. I personally reviewed the chest x-ray image(s) showing stable lung appearance     Physical Exam   Vital Signs: Temp: 98.6  F (37  C) Temp src: Bladder  Pulse: 83   Resp: 14 SpO2: 99 % O2 Device: Mechanical Ventilator    Weight: 171 lbs 8.29 oz  General Appearance: Intubated, sedated male in ICU bed  Respiratory: ventilated lung sounds, clear anterior breath sounds  Cardiovascular: vad hum, driveline site c/d/i  GI: soft, bs hypoactive  Skin: warm, well perfused, minimal peripheral edema  Neuro: sedated, intubated, pupils equal     Data   Recent Labs   Lab 07/10/22  0603 07/10/22  0410 07/10/22  0352 07/10/22  0349 07/09/22  2206 07/09/22  2146 07/09/22  1552 07/09/22  1546 07/09/22  0419 07/09/22  0408 07/08/22  2225 07/08/22  2213 07/08/22  1644 07/08/22  1643   WBC  --   --  9.0  --   --  7.4  --  6.1   < > 6.5  --  7.8   < > 14.1*   HGB  --   --  9.9*  --   --  9.2*  --  9.5*   < > 9.3*  --  8.7*   < > 7.2*   MCV  --   --  85  --   --  85  --  87   < > 85  --  89   < > 89   PLT  --   --  82*  --   --  85*  --  96*   < > 117*  --  114*   < > 163   INR  --   --   --  1.24*  --  1.32*  --  1.25*   < > 1.22*  --  1.45*   < > 1.87*   NA  --   --   --  133*  --  137  --  137   < > 136  --  137  --  141   POTASSIUM  --   --   --  4.1  --  4.2  --  4.1   < > 5.6*  --  4.4  --  3.9   CHLORIDE  --    --   --  103  --  105  --  105   < > 105  --  106  --  107   CO2  --   --   --  20*  --  22  --  23   < > 22  --  22  --  20*   BUN  --   --   --  15.0  --  14.5  --  14.6   < > 17.1  --  16.7  --  14.0   CR  --   --   --  0.59*  --  0.63*  --  0.64*   < > 0.77  --  0.78  --  0.73   ANIONGAP  --   --   --  10  --  10  --  9   < > 9  --  9  --  14   ELDA  --   --   --  8.3*  --  8.4*  --  8.5*   < > 8.4*  --  8.3*  --  8.2*   * 104*  --  105*   < > 113*   < > 114*   < > 123*   < > 101*   < > 108*   ALBUMIN  --   --   --  3.1*  --   --   --   --   --  2.9*  --  2.5*  --  2.3*   PROTTOTAL  --   --   --   --   --   --   --   --   --   --   --  4.4*  --  3.8*   BILITOTAL  --   --   --   --   --   --   --   --   --   --   --  1.0  --  1.0   ALKPHOS  --   --   --   --   --   --   --   --   --   --   --  59  --  51   ALT  --   --   --  21  --   --   --   --   --  29  --  29  --  25   AST  --   --   --   --   --   --   --   --   --   --   --  159*  --  134*    < > = values in this interval not displayed.     Recent Results (from the past 24 hour(s))   XR Chest Port 1 View    Impression    RESIDENT PRELIMINARY INTERPRETATION  IMPRESSION:   Stable postoperative chest. No significant change in RVAD, LVAD, and  ETT position.     Medications     BETA BLOCKER NOT PRESCRIBED       DOBUTamine 2.5 mcg/kg/min (07/10/22 0700)     EPINEPHrine Stopped (07/08/22 2100)     fentaNYL 150 mcg/hr (07/10/22 0700)     insulin regular 0.2 Units/hr (07/10/22 0700)     norepinephrine Stopped (07/08/22 2030)     Patient RECEIVING antibiotic to treat a different condition and it provides ADEQUATE COVERAGE for this surgical procedure.       propofol (DIPRIVAN) infusion 55 mcg/kg/min (07/10/22 0700)     vasopressin Stopped (07/08/22 2015)       acetaminophen  975 mg Oral or Feeding Tube Q8H     ceFEPIme (MAXIPIME) IV  2 g Intravenous Q8H     fluconazole  200 mg Intravenous Q24H     heparin ANTICOAGULANT  5,000 Units Subcutaneous Q8H     mupirocin   1 g Both Nostrils BID     pantoprazole  40 mg Oral or NG Tube Daily    Or     pantoprazole  40 mg Oral Daily     polyethylene glycol  17 g Oral or Feeding Tube Daily     rifampin  600 mg Intravenous Q24H     senna-docusate  1 tablet Oral or Feeding Tube BID     sodium chloride (PF)  3 mL Intracatheter Q8H     sodium chloride (PF)  3 mL Intracatheter Q8H     vancomycin  1,000 mg Intravenous Q12H

## 2022-07-10 NOTE — PROGRESS NOTES
CV ICU PROGRESS NOTE  7/9/2022       CO-MORBIDITIES  1. Cardiogenic shock (H)  (primary encounter diagnosis)  2. Ischemic cardiomyopathy  3. Heart failure with reduced ejection fraction, NYHA class III (H)  4. Coronary artery disease involving native coronary artery of native heart without angina pectoris      ASSESSMENT Dandy Sands is a 61 year old male with a PMH of CAD s/p PCI to LAD, MI, ICM, acute on chronic heart failure, s/p CRT-D, severe MR, antiphospholipid antibody syndrome on chronic warfarin, SLE, HTN, HLD, recent hospitalization 5/16-5/26 s/p RCA, LAD stenting who underwent RVAD (29F Protek duo RIJ) LVAD placement (HeartMate 3) on 7/8/22 by Dr. Zamora.    Changes 7/10:  - Begin straight rate heparin 400 cc/hr  - Fluid status reassessment in afternoon  - Begin OG trickle feed (10ml/hr, then 20ml/hr after 4h if tolerated), NPO 5AM 7/11 for OR  - OR tomorrow 4PM for chest closure    PLAN:  Neuro/ pain/ sedation:  #Acute Postoperative pain  #Depression  #Anxiety due to a medical condition  #Insomnia  - Monitor neurological status. Notify the MD for any acute changes in exam.  - Pain: fentanyl gtt. Scheduled tylenol. PRN tylenol, oxycodone, dilaudid.  - Sedation: propofol gtt  - Holding PTA meds: duloxetine 30mg, hydroxyzine 25mg PRN, zolpidem 5mg, melatonin 3mg, lorazepam 0.5mg    Pulmonary:   #Acute hypoxic respiratory failure on iMV  #Mild-moderate emphysema  #History of COVID-19  - Patient with open chest requiring ongoing iMV    Cardiovascular:    #S/p LVAD placement (HeartMate 3) on 7/8/22   #S/p RVAD (29F Protek duo RIJ) on 7/8/22  #Cardiogenic shock  #Advanced ischemic cardiomyopathy, class IV, stage D  #CAD s/p PCI to LAD (2005)  #S/p CRT-D (2006)  #History of MI (2007)  #Severe MR  #Antiphospholipid antibody syndrome on chronic warfarin  #HTN  #HLD  #Recent hospitalization 5/16-5/26 s/p RCA, LAD stenting  #Atrial fibrillation   - Monitor hemodynamic status  - Goal MAP > 65  - Wean off  pressors, inotropes, and nitric oxide as tolerated  - PTA meds: atorvastatin 40mg, clopidogrel 75mg, lasix 40mg, warfarin 5mg  - LVAD management per Advanced HF service  - Begin straight rate heparin 400 cc/hr      GI / Nutrition:   #GERD  #Congestive hepatopathy in the setting of cardiac insuffiency  - NPO; begin trickle feeds via OG today   - PPI  - Bowel regimen  - No indication for parenteral nutrition.  - Nutrition consulted.  - Trend LFT's    Renal/ Fluid Balance:    - Strict I/O, daily weights  - Avoid/limit nephrotoxins as able  - Replete lytes PRN per protocol  - PTA meds: tamsulosin 0.4mg (held)  - Fluid goal net even to mild negative       Endocrine:    #Stress induced hyperglycemia  Preop A1c 5.5%  - Insulin gtt  - Goal BG <180 for optimal healing      ID/ Antibiotics:  #Periopearive antibiosis  - Cefepime, vancomycin, rifampin, fluconazole  - Trend fever curve     Heme:     #Acute blood loss anemia  #Acute blood loss thrombocytopenia  #History of DVT and PE   #Antiphospholipid antibody syndrome  #History of iron deficiency anemia  - Monitor for s/sx of bleeding  - Trend CBC and coags.  - Hgb goal > 7  - Plt goal > 20    Rheumatology:  #SLE  - Chronic, symptoms include arthritis, photosensitivity, fatigue, and skin manifestations  - PTA meds: hydroxychloroquine 200mg, held     Prophylaxis:    - DVT prophylaxis: SCD, SQH  - PPI  - Bowel regimen  - PT/OT      Disposition:  - CV ICU.        ====================================================    Patient seen, findings and plan discussed with CV ICU staff, Dr. Vance.      Rashard Stephenson MD  Anesthesiology, PGY3    ====================================================        SUBJECTIVE  - Afib overnight to 130s intermittently, no pharmacotherapy, stable BPs   - ECMO chugging, given 250 LR, 250 albumin, 1u pRBC  - Low UOP    OBJECTIVE    1. VITAL SIGNS    Temp:  [97.7  F (36.5  C)-99  F (37.2  C)] 98.6  F (37  C)  Pulse:  [] 79  Resp:  [9-25] 14  MAP:  [72  mmHg-106 mmHg] 85 mmHg  Arterial Line BP: ()/(54-80) 91/64  FiO2 (%):  [40 %-50 %] 50 %  SpO2:  [74 %-100 %] 100 %  Vent Mode: CMV/AC  (Continuous Mandatory Ventilation/ Assist Control)  FiO2 (%): 50 %  Resp Rate (Set): 14 breaths/min  Tidal Volume (Set, mL): 450 mL  PEEP (cm H2O): 5 cmH2O  Resp: 14        2. INTAKE/ OUTPUT    I/O last 3 completed shifts:  In: 3729.21 [I.V.:1965.21; NG/GT:350]  Out: 2180 [Urine:770; Emesis/NG output:200; Chest Tube:1210]      3. PHYSICAL EXAMINATION    General: sedated  Neuro: RAAS -2   Resp: intubated  CV: afib; PPM in place  Abdomen: Soft, Non-distended  Skin/Incisions: open chest, right internal jugular cannulation, left femoral CVC/arterial line.  Extremities: warm and well perfused. LUE ecchymosis, unchanged      4. INVESTIGATIONS    Arterial Blood Gases   Recent Labs   Lab 07/10/22  1157 07/10/22  1000 07/10/22  0756 07/10/22  0548   PH 7.43 7.43 7.44 7.44   PCO2 35 36 34* 34*   PO2 180* 174* 180* 180*   HCO3 24 24 23 23     Complete Blood Count   Recent Labs   Lab 07/10/22  0958 07/10/22  0352 07/09/22 2146 07/09/22  1546   WBC 9.4 9.0 7.4 6.1   HGB 9.4* 9.9* 9.2* 9.5*   PLT 78* 82* 85* 96*     Basic Metabolic Panel  Recent Labs   Lab 07/10/22  1202 07/10/22  1008 07/10/22  0958 07/10/22  0829 07/10/22  0410 07/10/22  0349 07/09/22  2206 07/09/22  2146 07/09/22  1552 07/09/22  1546   NA  --   --  131*  --   --  133*  --  137  --  137   POTASSIUM  --   --  3.9  --   --  4.1  --  4.2  --  4.1   CHLORIDE  --   --  102  --   --  103  --  105  --  105   CO2  --   --  21*  --   --  20*  --  22  --  23   BUN  --   --  14.5  --   --  15.0  --  14.5  --  14.6   CR  --   --  0.62*  --   --  0.59*  --  0.63*  --  0.64*   * 112* 106* 101*   < > 105*   < > 113*   < > 114*    < > = values in this interval not displayed.     Liver Function Tests  Recent Labs   Lab 07/10/22  0958 07/10/22  0349 07/09/22  2146 07/09/22  1546 07/09/22  0952 07/09/22  0408 07/08/22  2212  07/08/22  1843 07/08/22  1643 07/08/22  1400 07/08/22  0332 07/07/22  0356   AST  --   --   --   --   --   --  159*  --  134*  --  32 32   ALT  --  21  --   --   --  29 29  --  25  --  39 41   ALKPHOS  --   --   --   --   --   --  59  --  51  --  127 125   BILITOTAL  --   --   --   --   --   --  1.0  --  1.0  --  0.3 0.3   ALBUMIN  --  3.1*  --   --   --  2.9* 2.5*  --  2.3*  --  3.5 3.5   INR 1.28* 1.24* 1.32* 1.25*   < > 1.22* 1.45*   < > 1.87*   < > 1.21* 1.13    < > = values in this interval not displayed.     Pancreatic Enzymes  No lab results found in last 7 days.  Coagulation Profile  Recent Labs   Lab 07/10/22  0958 07/10/22  0349 07/09/22  2146 07/09/22  1546   INR 1.28* 1.24* 1.32* 1.25*   PTT 52* 84* 45* 44*         5. RADIOLOGY    Recent Results (from the past 24 hour(s))   XR Chest Port 1 View    Narrative    EXAM: XR CHEST PORT 1 VIEW, 7/10/2022 2:34 AM    INDICATION: RVAD/LVAD/ETT    COMPARISON:  7/9/2022    FINDINGS  Technique: Supine AP view of the chest.     Devices: Endotracheal tube terminates over the midthoracic trachea  approximately 5 cm above the paola. Right subclavian approach  intra-aortic balloon pump superior marker projects over the aortic  arch, unchanged. Left chest 3-lead cardiac conduction device. LVAD  device projects over the lower left hemithorax. Mediastinal drains  project over the mediastinum. Right internal jugular ECMO cannula  terminates over the main pulmonary artery. Packing material markers  project over the superior mediastinum. External pacer leads project  over the left chest. Right PICC is coiled over the right subclavian  vein and terminates over the right innominate vein. Left-sided  coronary stent.    No new focal airspace opacity.  No discernible pneumothorax.  No  pleural effusion.  Unchanged cardiomegaly.      Impression    IMPRESSION:   1. No significant change in RVAD, LVAD, and ETT position.  2. Right PICC remains coiled in the subclavian vein.   3. No new  focal airspace opacity.     Results discussed with Farhat Ahuja RN on 7/10/2022 at 0749 hours.     I have personally reviewed the examination and initial interpretation  and I agree with the findings.    GABRIEL COURTNEY MD         SYSTEM ID:  [id number]   XR Chest Port 1 View    Narrative    XR CHEST PORT 1 VIEW  7/10/2022 10:03 AM      HISTORY: assess PICC line coil after power flush    COMPARISON: 7/10/2022    FINDINGS:   Single AP view of the chest. Postoperative changes of LVAD. Stable  left chest wall ICD leads. Stable right IJ RVAD. Endotracheal tip over  the mid thoracic trachea. Enteric tube courses beyond the  field-of-view. Left basilar chest tube and mediastinal drains in  similar position. Similar surgical sponges over the mediastinum. Right  arm PICC tip over the low SVC with resolved loop. Stable mediastinum.  No pneumothorax or significant right pleural effusion. Small left  pleural effusion. Similar retrocardiac opacities.      Impression    IMPRESSION:   1. Resolved right arm PICC looping with tip in the low SVC. Stable  remaining devices.  2. Small left pleural effusion with unchanged retrocardiac  atelectasis/consolidation.    I have personally reviewed the examination and initial interpretation  and I agree with the findings.    GABRIEL COURTNEY MD         SYSTEM ID:  [id number]         6. LINES/DRAINS/AIRWAY    Peripheral IV 07/05/22 Left;Anterior Upper forearm (Active)   Site Assessment WDL 07/10/22 1200   Line Status Saline locked 07/10/22 1200   Dressing Intervention New dressing  07/07/22 0400   Phlebitis Scale 0-->no symptoms 07/10/22 1200   Infiltration Scale 0 07/10/22 1200   If infiltrated, was a vesicant infusing? No 07/10/22 1200   Number of days: 5       PICC Triple Lumen 06/17/22 Right Brachial vein medial (Active)   Site Assessment WDL 07/10/22 1200   Dressing Intervention Chlorhexidine patch;Transparent 07/10/22 1200   Dressing Change Due 07/17/22 07/10/22 1200   PICC Comment from OSH  06/17/22 1700   Osborne - Status saline locked 07/10/22 1200   Osborne - Cap Change Due 07/12/22 07/10/22 1200   Red - Status infusing 07/10/22 1200   Red - Cap Change Due 07/12/22 07/10/22 1200   White - Status infusing 07/10/22 1200   White - Cap Change Due 07/12/22 07/10/22 1200   Extravasation? No 07/10/22 1200   Line Necessity Yes, meets criteria 07/10/22 1200   Number of days: 23       Introducer 07/08/22 Femoral Left (Active)   Specific Qualities Capped 07/10/22 1200   (Retired) Dressing Status Clean, dry, intact 07/10/22 1200   Dressing Intervention Dressing changed 07/10/22 0000   Dressing Change Due 07/17/22 07/10/22 1200   Number of days: 2       Arterial Line 07/08/22 Femoral (Active)   Site Assessment WDL 07/10/22 1200   Line Status Pulsatile blood flow 07/10/22 1200   Arterine Line Cap Change Due 07/12/22 07/10/22 1200   Art Line Waveform Appropriate 07/10/22 1200   Art Line Interventions Zeroed and calibrated;Leveled;Connections checked and tightened;Flushed per protocol 07/10/22 1200   Color/Movement/Sensation Capillary refill less than 3 sec 07/10/22 1200   Line Necessity Yes, meets criteria 07/10/22 1200   Dressing Type Transparent 07/10/22 1200   Dressing Status Clean, dry, intact 07/10/22 1200   Dressing Intervention Dressing changed/new dressing 07/10/22 0000   Dressing Change Due 07/10/22 07/10/22 1200   Number of days: 2       Arterial Line 07/08/22 Radial (Active)   Site Assessment WDL 07/10/22 1200   Line Status Pulsatile blood flow 07/10/22 1200   Arterine Line Cap Change Due 07/12/22 07/10/22 1200   Art Line Waveform Appropriate 07/10/22 1200   Art Line Interventions Zeroed and calibrated;Leveled;Connections checked and tightened 07/10/22 1200   Color/Movement/Sensation Capillary refill less than 3 sec 07/10/22 1200   Line Necessity Yes, meets criteria 07/10/22 1200   Dressing Type Transparent 07/10/22 1200   Dressing Status Clean, dry, intact 07/10/22 1200   Dressing Intervention Dressing  changed/new dressing 07/10/22 0000   Dressing Change Due 07/10/22 07/10/22 1200   Number of days: 2       Venous Sheath 07/08/22 Other (comment) Right (Active)   Specific Qualities Sutured;Left in Place 07/10/22 1200   Site Assessment UTV 07/10/22 1200   Dressing Type Transparent 07/10/22 0400   Dressing Intervention Dressing changed/new dressing 07/08/22 1700   Venous Sheath Comment Protek Duo 07/10/22 1200   Number of days: 2       CVC Double Lumen Left Femoral (Active)   Site Assessment WDL 07/10/22 1200   Dressing Type Chlorhexidine disk;Transparent 07/10/22 1200   Dressing Status clean;dry;intact 07/10/22 1200   Dressing Intervention dressing changed 07/10/22 0000   Dressing Change Due 07/17/22 07/10/22 1200   Line Necessity yes, meets criteria 07/10/22 1200   Brown - Status infusing 07/10/22 1200   Brown - Cap Change Due 07/12/22 07/10/22 1200   White - Status infusing 07/10/22 1200   White - Cap Change Due 07/12/22 07/10/22 1200   Phlebitis Scale 0-->no symptoms 07/10/22 1200   Infiltration? no 07/10/22 1200   Infiltration Scale 0 07/10/22 1200   CVC Comment MAC 07/10/22 1200   Number of days: 2       ETT Cuffed Single 8 mm (Active)   Secured at (cm) 23 cm 07/10/22 1200   Measured from Teeth/Gums 07/10/22 1200   Position Left 07/10/22 1200   Secured by Commercial tube partida 07/10/22 1200   Bite Block None Present 07/10/22 1200   Site Appearance Clean;Dry 07/10/22 1200   Tube Care Site care done 07/10/22 1200   Cuff Assessment Minimal leak technique 07/10/22 1200   Safety Measures Manual resuscitator at bedside 07/10/22 1200   Number of days: 2       Chest Tube 1 Anterior Mediastinal 9 [place] (Active)   Site Assessment UTV 07/10/22 1200   Suction -20 cm H2O 07/10/22 1200   Chest Tube Airleak No 07/10/22 1200   Drainage Description Serosanguinous 07/10/22 1200   Dressing Status Normal: Clean, Dry & Intact 07/10/22 1200   Dressing Change Due 07/11/22 07/10/22 1200   Dressing Intervention Gauze 07/10/22 1200    Patency Intervention Stripped 07/10/22 1200   Chest Tube Clamps at Bedside present 07/10/22 1200   Container Amount 1850 07/10/22 1200   Output (ml) 40 ml 07/10/22 1200   Number of days: 2       Chest Tube 2 Anterior Mediastinal 9 [place] (Active)   Site Assessment UTV 07/10/22 1200   Suction -20 cm H2O 07/10/22 1200   Chest Tube Airleak No 07/10/22 1200   Drainage Description Serosanguinous 07/10/22 1200   Dressing Status Normal: Clean, Dry & Intact 07/10/22 1200   Dressing Change Due 07/11/22 07/10/22 1200   Dressing Intervention Gauze 07/10/22 1200   Patency Intervention Stripped 07/10/22 1200   Chest Tube Clamps at Bedside present 07/10/22 1200   Number of days: 2       Chest Tube 3 Pleural 32 [place] (Active)   Site Assessment UTV 07/10/22 1200   Suction -20 cm H2O 07/10/22 1200   Chest Tube Airleak No 07/10/22 1200   Drainage Description Serosanguinous 07/10/22 1200   Dressing Status Normal: Clean, Dry & Intact 07/10/22 1200   Dressing Change Due 07/11/22 07/10/22 1200   Dressing Intervention Gauze 07/10/22 1200   Patency Intervention Stripped 07/10/22 1200   Chest Tube Clamps at Bedside present 07/10/22 1200   Container Amount 1700 07/10/22 1200   Output (ml) 0 ml 07/10/22 1200   Number of days: 2       Chest Tube 4 Posterior Pericardial 24 [place] (Active)   Site Assessment UTV 07/10/22 1200   Suction -20 cm H2O 07/10/22 1200   Chest Tube Airleak No 07/10/22 1200   Drainage Description Serosanguinous 07/10/22 1200   Dressing Status Normal: Clean, Dry & Intact 07/10/22 1200   Dressing Change Due 07/11/22 07/10/22 1200   Dressing Intervention Gauze 07/10/22 1200   Patency Intervention Stripped 07/10/22 1200   Chest Tube Clamps at Bedside present 07/10/22 1200   Number of days: 2       Chest Tube 5 Anterior Mediastinal 28 [place] (Active)   Site Assessment UTV 07/10/22 1200   Suction -20 cm H2O 07/10/22 1200   Chest Tube Airleak No 07/10/22 1200   Drainage Description Serosanguinous 07/10/22 1200   Dressing  Status Normal: Clean, Dry & Intact 07/10/22 1200   Dressing Change Due 07/11/22 07/10/22 1200   Dressing Intervention Gauze 07/10/22 1200   Patency Intervention Stripped 07/10/22 1200   Chest Tube Clamps at Bedside present 07/10/22 1200   Number of days: 2       NG/OG/NJ Tube Orogastric (Active)   Site Description WDL 07/10/22 1200   Status Clamped 07/10/22 1200   Drainage Appearance Bile 07/10/22 1200   Placement Confirmation X-ray 07/10/22 1200   Sunrise (cm marking) at nare/mouth 80 cm 07/10/22 1200   Intake (ml) 100 ml 07/10/22 0800   Flush/Free Water (mL) 30 mL 07/10/22 1200   Container Amount 250 mL 07/10/22 1200   Output (ml) 50 ml 07/10/22 1200   Number of days: 2       Urethral Catheter 07/08/22 Coude;Latex;Temperature probe;Triple-lumen 16 fr (Active)   Tube Description Positional 07/10/22 1200   Catheter Care Done;Catheter wipes 07/10/22 0800   Collection Container Standard 07/10/22 1200   Securement Method Securing device (Describe) 07/10/22 1200   Rationale for Continued Use Strict 1-2 Hour I&O;Deep Sedation/Paralysis 07/10/22 1200   Urine Output 100 mL 07/10/22 1200   Number of days: 2       Incision/Surgical Site 06/23/22 Right Chest (Active)   Incision Assessment UTV 07/10/22 1200   Deirdre-Incision Assessment UTV 07/10/22 0400   Closure FABRICE 07/10/22 1200   Incision Drainage Amount UTV 07/10/22 1200   Drainage Description UTV 07/10/22 1200   Incision Care Wound cleanser 07/10/22 0000   Dressing Intervention Clean, dry, intact 07/10/22 1200   Number of days: 17       Incision/Surgical Site 07/08/22 Chest (Active)   Incision Assessment UTV 07/10/22 1200   Deirdre-Incision Assessment UTV 07/10/22 0400   Closure Other (Comment) 07/10/22 1200   Incision Drainage Amount Scant 07/10/22 1200   Drainage Description Sanguinous 07/10/22 0400   Incision Care Other (Comment) 07/10/22 0400   Dressing Intervention Clean, dry, intact 07/10/22 1200   Number of days: 2           ====================================================

## 2022-07-10 NOTE — PHARMACY-VANCOMYCIN DOSING SERVICE
Pharmacy Vancomycin Note  Date of Service July 10, 2022  Patient's  1960   61 year old, male    Indication: Surgical Prophylaxis and open chest  Day of Therapy: 2  Current vancomycin regimen:  1000 mg IV q12h  Current vancomycin monitoring method: AUC  Current vancomycin therapeutic monitoring goal: 400-600 mg*h/L    Insight[employer] Prediction of Current Vancomycin Regimen  Loading dose: N/A  Regimen: 1000 mg IV every 12 hours.  Start time: 15:25 on 07/10/2022  Exposure target: AUC24 (range)400-600 mg/L.hr   AUC24,ss: 429 mg/L.hr  Probability of AUC24 > 400: 65 %  Ctrough,ss: 12.8 mg/L  Probability of Ctrough,ss > 20: 4 %  Probability of nephrotoxicity (Lodise TAD ): 8 %      Current estimated CrCl = Estimated Creatinine Clearance: 139.4 mL/min (A) (based on SCr of 0.62 mg/dL (L)).    Creatinine for last 3 days  2022:  3:32 AM Creatinine 0.66 mg/dL;  4:43 PM Creatinine 0.73 mg/dL; 10:13 PM Creatinine 0.78 mg/dL  2022:  4:08 AM Creatinine 0.77 mg/dL;  9:52 AM Creatinine 0.71 mg/dL;  3:46 PM Creatinine 0.64 mg/dL;  9:46 PM Creatinine 0.63 mg/dL  7/10/2022:  3:49 AM Creatinine 0.59 mg/dL;  9:58 AM Creatinine 0.62 mg/dL    Recent Vancomycin Levels (past 3 days)  7/10/2022: 10:23 AM Vancomycin 12.8 ug/mL    Vancomycin IV Administrations (past 72 hours)                   vancomycin (VANCOCIN) 1000 mg in dextrose 5% 200 mL PREMIX (mg) 1,000 mg New Bag 07/10/22 1039     1,000 mg New Bag 22 2253     1,000 mg New Bag  918     1,000 mg New Bag 22 2238    vancomycin (VANCOCIN) 1000 mg in dextrose 5% 200 mL PREMIX (mg) 1,000 mg Given 22 1005                Nephrotoxins and other renal medications (From now, onward)    Start     Dose/Rate Route Frequency Ordered Stop    07/10/22 2200  vancomycin 1250 mg in 0.9% NaCl 250 mL intermittent infusion 1,250 mg         1,250 mg  over 90 Minutes Intravenous EVERY 12 HOURS 07/10/22 1427               Contrast Orders - past 72 hours (72h ago, onward)     None          Interpretation of levels and current regimen:  Vancomycin level is reflective of -600, however barely within range and low probability of of AUC >400    Has serum creatinine changed greater than 50% in last 72 hours: No    Urine output:  good urine output    Renal Function: Stable    InsightRX Prediction of Planned New Vancomycin Regimen  Loading dose: N/A  Regimen: 1250 mg IV every 12 hours.  Start time: 15:25 on 07/10/2022  Exposure target: AUC24 (range)400-600 mg/L.hr   AUC24,ss: 534 mg/L.hr  Probability of AUC24 > 400: 94 %  Ctrough,ss: 16.1 mg/L  Probability of Ctrough,ss > 20: 19 %  Probability of nephrotoxicity (Lodise TAD 2009): 11 %      Plan:  1. Increase Dose to 1250 mg IV q12 hours. Increasing dose slightly to give better chance of AUC goal attainment   2. Vancomycin monitoring method: AUC  3. Vancomycin therapeutic monitoring goal: 400-600 mg*h/L  4. Pharmacy will check vancomycin levels as appropriate in 1-3 Days.  5. Serum creatinine levels will be ordered daily for the first week of therapy and at least twice weekly for subsequent weeks.    Popeye Case, [facility]

## 2022-07-10 NOTE — PROGRESS NOTES
ECMO Shift Summary: (1845 to 0715)    ECMO Equipment:  Console serial number: Primary [id number], secondary [id number]  Circuit Lot number: [id number]  Oxygenator Lot number: [id number]  Pump head lot number: [id number]     Patient remains on RVAD support, all equipment is functioning and alarms are appropriately set. RPMs 4050 with flow range 3.34 - 3.38 L/min. Sweep gas remains off - has been off for ~24 hrs. Fibrin rings/deposits present and developing at circuit connections. Oxygenator has clot and fibrin deposits on both sides. Cannulas are secure with no bleeding from site. Extremities are cool.     Significant Shift Events:   Pt had chugging/loss of RVAD flows at the beginning of shift, slow response/improvement with administration of 250 mL LR + 250 mL of 5% Albumin; at the time, Hgb was ~8.8 on circuit, dropped slightly with volume administration. 1x PRBC given with no further chugging episodes; pt seems to do better when Hgb is >9. Hgb was 9 - 10 on the circuit for the rest of the shift, no further episodes of chugging/low flow alarms on RVAD.     Pt has been in and out of A-fib several times this shift, had two spontaneous, self-limiting episodes of RVR, rates in the 120 - 140s, no effect on flows/MAPs.     Bleeding/Anticoagulation:  Heparin remains off, ACT range 126 - 135.    CT output less compared to last night, down to 30 - 70 mL/hr.    Volume Status:  Urine output is ~30 mL/hr.      Intake/Output Summary (Last 24 hours) at 7/10/2022 0601  Last data filed at 7/10/2022 0600  Gross per 24 hour   Intake 4048.71 ml   Output 2180 ml   Net 1868.71 ml       Labs:      Recent Labs   Lab 07/10/22  0356 07/10/22  0349 07/10/22  0152 07/10/22  0007 07/09/22  2148   PH  --  7.42 7.45 7.43 7.45   PCO2  --  36 34* 36 34*   PO2  --  184* 182* 174* 174*   HCO3  --  24 24 24 24   O2PER 40 40 40 40 40       Lab Results   Component Value Date    HGB 9.9 (L) 07/10/2022    PHGB 40 (H) 07/09/2022    PLT 82 (L) 07/10/2022     FIBR 331 07/10/2022    INR 1.24 (H) 07/10/2022    PTT 84 (H) 07/10/2022    DD 1.99 (H) 07/10/2022    ANTCH 72 (L) 07/09/2022       Vent settings:  Vent Mode: CMV/AC  (Continuous Mandatory Ventilation/ Assist Control)  FiO2 (%): 40 %  Resp Rate (Set): 14 breaths/min  Tidal Volume (Set, mL): 450 mL  PEEP (cm H2O): 5 cmH2O  Resp: 14  .        Plan is for possible removal of oxygenator from circuit today.      Lisa Alvarez, RRT-ACCS / ECMO Specialist  7/10/2022 6:01 AM

## 2022-07-10 NOTE — PROGRESS NOTES
ECMO Shift Summary: [id number]       ECMO Equipment:  Console serial number: Primary [id number], secondary [id number]  Circuit Lot number: [id number]  Oxygenator Lot number: [id number]  Pump head lot number: [id number]       Patient remains on VV ECMO (RVAD with oxygenator), all equipment is functioning and alarms are appropriately set. RPM's 4050 with flow range 3.32-3.4 L/min. Sweep gas is at 0 LPM and FiO2. Circuit remains free of visible air.  Clot and fibrin pre and post oxygenator. Cannulas are secure with no bleeding from site. Extremities are perfused.     Significant Shift Events: None    Vent settings:  Vent Mode: CMV/AC  (Continuous Mandatory Ventilation/ Assist Control)  FiO2 (%): 50 %  Resp Rate (Set): 14 breaths/min  Tidal Volume (Set, mL): 450 mL  PEEP (cm H2O): 5 cmH2O  Resp: 14  .    Heparin is running at 400 u/hr, ACT range 147-165.    Urine output is 590 since midnight, blood loss was minimal. Product given included none.       Intake/Output Summary (Last 24 hours) at 7/10/2022 1719  Last data filed at 7/10/2022 1700  Gross per 24 hour   Intake 2944.25 ml   Output 1680 ml   Net 1264.25 ml       ECHO:  No results found for this or any previous visit.  No results found for this or any previous visit.      CXR:  Recent Results (from the past 24 hour(s))   XR Chest Port 1 View    Narrative    EXAM: XR CHEST PORT 1 VIEW, 7/10/2022 2:34 AM    INDICATION: RVAD/LVAD/ETT    COMPARISON:  7/9/2022    FINDINGS  Technique: Supine AP view of the chest.     Devices: Endotracheal tube terminates over the midthoracic trachea  approximately 5 cm above the paola. Right subclavian approach  intra-aortic balloon pump superior marker projects over the aortic  arch, unchanged. Left chest 3-lead cardiac conduction device. LVAD  device projects over the lower left hemithorax. Mediastinal drains  project over the mediastinum. Right internal jugular ECMO cannula  terminates over the main pulmonary artery. Packing  material markers  project over the superior mediastinum. External pacer leads project  over the left chest. Right PICC is coiled over the right subclavian  vein and terminates over the right innominate vein. Left-sided  coronary stent.    No new focal airspace opacity.  No discernible pneumothorax.  No  pleural effusion.  Unchanged cardiomegaly.      Impression    IMPRESSION:   1. No significant change in RVAD, LVAD, and ETT position.  2. Right PICC remains coiled in the subclavian vein.   3. No new focal airspace opacity.     Results discussed with Farhat Ahuja RN on 7/10/2022 at 0749 hours.     I have personally reviewed the examination and initial interpretation  and I agree with the findings.    GABRIEL COURTNEY MD         SYSTEM ID:  [id number]   XR Chest Port 1 View    Narrative    XR CHEST PORT 1 VIEW  7/10/2022 10:03 AM      HISTORY: assess PICC line coil after power flush    COMPARISON: 7/10/2022    FINDINGS:   Single AP view of the chest. Postoperative changes of LVAD. Stable  left chest wall ICD leads. Stable right IJ RVAD. Endotracheal tip over  the mid thoracic trachea. Enteric tube courses beyond the  field-of-view. Left basilar chest tube and mediastinal drains in  similar position. Similar surgical sponges over the mediastinum. Right  arm PICC tip over the low SVC with resolved loop. Stable mediastinum.  No pneumothorax or significant right pleural effusion. Small left  pleural effusion. Similar retrocardiac opacities.      Impression    IMPRESSION:   1. Resolved right arm PICC looping with tip in the low SVC. Stable  remaining devices.  2. Small left pleural effusion with unchanged retrocardiac  atelectasis/consolidation.    I have personally reviewed the examination and initial interpretation  and I agree with the findings.    GABRIEL COURTNEY MD         SYSTEM ID:  [id number]       Labs:  Recent Labs   Lab 07/10/22  1548 07/10/22  1157 07/10/22  1000 07/10/22  0756   PH 7.44 7.43 7.43 7.44   PCO2 34*  35 36 34*   PO2 182* 180* 174* 180*   HCO3 23 24 24 23   O2PER 50 50 40 40       Lab Results   Component Value Date    HGB 9.6 (L) 07/10/2022    PHGB 100 (H) 07/10/2022    PLT 68 (L) 07/10/2022    FIBR 365 07/10/2022    INR 1.28 (H) 07/10/2022    PTT 59 (H) 07/10/2022    DD 1.63 (H) 07/10/2022    ANTCH 67 (L) 07/10/2022         Plan is for a wash out and possible closure tomorrow.  Plan to splice out oxygenator in the OR.        Parth Whalen, RT  ECMO Specialist  7/10/2022 5:19 PM

## 2022-07-10 NOTE — PROGRESS NOTES
PICC TLALYSA, was seen coiled per Chest Xray 7/9, via subclavian. Writer power flushed catheter with 10 ml of saline , pulled back 2 cm of catheter, per verbal order of MD  Chest xray showed PICC tip was in low SVC. RN notified PICC was ready to use.     30 minutes spent with patient with procedure.

## 2022-07-10 NOTE — PLAN OF CARE
"ICU End of Shift Summary. See flowsheets for vital signs and detailed assessment.    Changes this shift:     Neuro: spontaneously moves all extremities when sedation lifted. Does not follow commands.     Cardiac: ICU and cards teams notified for LVAD flow < 3.5 and PI < 3. Hydralazine 20mg added scheduled every 8 hours for afterload reduction. Afib alternating with NSR, Dobutamine discontinued and afib resolved.     GI: enteral feeds initiated via OG.     : UOP 15-60/hr. MD notified of fluid status throughout shift    LDA: PICC line loop resolved by PICC RN.    Plan:  NPO at 0500 for chest washout and closure tomorrow. Advance tube feeds at 2200 to 20cc/hr if tolerating. Diuresis if UOP remains low and trending net positive.     Problem: Plan of Care - These are the overarching goals to be used throughout the patient stay.    Goal: Patient-Specific Goal (Individualized)  Description: You can add care plan individualizations to a care plan. Examples of Individualization might be:  \"Parent requests to be called daily at 9am for status\", \"I have a hard time hearing out of my right ear\", or \"Do not touch me to wake me up as it startles me\".  Outcome: Ongoing, Progressing  Flowsheets (Taken 7/10/2022 1838)  Patient-Specific Goals (Include Timeframe): successful chest closure tomorrow     Problem: Plan of Care - These are the overarching goals to be used throughout the patient stay.    Goal: Plan of Care Review/Shift Note  Description: The Plan of Care Review/Shift note should be completed every shift.  The Outcome Evaluation is a brief statement about your assessment that the patient is improving, declining, or no change.  This information will be displayed automatically on your shift note.  Outcome: Ongoing, Progressing  Flowsheets (Taken 7/10/2022 1838)  Plan of Care Reviewed With: daughter  Overall Patient Progress: improving   Goal Outcome Evaluation:    Plan of Care Reviewed With: daughter     Overall Patient " Progress: improving

## 2022-07-11 ENCOUNTER — ANESTHESIA (OUTPATIENT)
Dept: SURGERY | Facility: CLINIC | Age: 62
DRG: 001 | End: 2022-07-11
Payer: COMMERCIAL

## 2022-07-11 ENCOUNTER — APPOINTMENT (OUTPATIENT)
Dept: GENERAL RADIOLOGY | Facility: CLINIC | Age: 62
DRG: 001 | End: 2022-07-11
Attending: THORACIC SURGERY (CARDIOTHORACIC VASCULAR SURGERY)
Payer: COMMERCIAL

## 2022-07-11 ENCOUNTER — APPOINTMENT (OUTPATIENT)
Dept: GENERAL RADIOLOGY | Facility: CLINIC | Age: 62
DRG: 001 | End: 2022-07-11
Attending: STUDENT IN AN ORGANIZED HEALTH CARE EDUCATION/TRAINING PROGRAM
Payer: COMMERCIAL

## 2022-07-11 ENCOUNTER — ANCILLARY PROCEDURE (OUTPATIENT)
Dept: CARDIOLOGY | Facility: CLINIC | Age: 62
DRG: 001 | End: 2022-07-11
Attending: INTERNAL MEDICINE
Payer: COMMERCIAL

## 2022-07-11 ENCOUNTER — APPOINTMENT (OUTPATIENT)
Dept: GENERAL RADIOLOGY | Facility: CLINIC | Age: 62
DRG: 001 | End: 2022-07-11
Attending: INTERNAL MEDICINE
Payer: COMMERCIAL

## 2022-07-11 DIAGNOSIS — I50.9 DECOMPENSATED HEART FAILURE (H): ICD-10-CM

## 2022-07-11 DIAGNOSIS — I50.9 DECOMPENSATED HEART FAILURE (H): Primary | ICD-10-CM

## 2022-07-11 PROBLEM — Z95.811 LVAD (LEFT VENTRICULAR ASSIST DEVICE) PRESENT (H): Status: ACTIVE | Noted: 2022-07-11

## 2022-07-11 LAB
ACT BLD: 165 SECONDS (ref 74–150)
ACT BLD: 165 SECONDS (ref 74–150)
ACT BLD: 169 SECONDS (ref 74–150)
ACT BLD: 173 SECONDS (ref 74–150)
ALBUMIN SERPL BCG-MCNC: 0.6 G/DL (ref 3.5–5.2)
ALT SERPL W P-5'-P-CCNC: 19 U/L (ref 10–50)
ANION GAP SERPL CALCULATED.3IONS-SCNC: 10 MMOL/L (ref 7–15)
ANION GAP SERPL CALCULATED.3IONS-SCNC: 10 MMOL/L (ref 7–15)
ANION GAP SERPL CALCULATED.3IONS-SCNC: 8 MMOL/L (ref 7–15)
APTT PPP: 51 SECONDS (ref 22–38)
APTT PPP: 60 SECONDS (ref 22–38)
APTT PPP: 61 SECONDS (ref 22–38)
APTT PPP: NORMAL S
AT III ACT/NOR PPP CHRO: 63 % (ref 85–135)
ATRIAL RATE - MUSE: 37 BPM
ATRIAL RATE - MUSE: 43 BPM
ATRIAL RATE - MUSE: 75 BPM
BASE EXCESS BLDA CALC-SCNC: -1.1 MMOL/L (ref -9–1.8)
BASE EXCESS BLDA CALC-SCNC: -2 MMOL/L (ref -9–1.8)
BASE EXCESS BLDA CALC-SCNC: -2.1 MMOL/L (ref -9–1.8)
BASE EXCESS BLDA CALC-SCNC: -2.5 MMOL/L (ref -9–1.8)
BASE EXCESS BLDA CALC-SCNC: -2.5 MMOL/L (ref -9–1.8)
BASE EXCESS BLDA CALC-SCNC: -2.6 MMOL/L (ref -9–1.8)
BASE EXCESS BLDV CALC-SCNC: -0.2 MMOL/L (ref -7.7–1.9)
BASOPHILS # BLD AUTO: 0.1 10E3/UL (ref 0–0.2)
BASOPHILS NFR BLD AUTO: 1 %
BUN SERPL-MCNC: 14.8 MG/DL (ref 8–23)
BUN SERPL-MCNC: 16.6 MG/DL (ref 8–23)
BUN SERPL-MCNC: 16.9 MG/DL (ref 8–23)
CA-I BLD-MCNC: 4.5 MG/DL (ref 4.4–5.2)
CA-I BLD-MCNC: 4.5 MG/DL (ref 4.4–5.2)
CA-I BLD-MCNC: 4.6 MG/DL (ref 4.4–5.2)
CALCIUM SERPL-MCNC: 8 MG/DL (ref 8.8–10.2)
CALCIUM SERPL-MCNC: 8.1 MG/DL (ref 8.8–10.2)
CALCIUM SERPL-MCNC: 8.1 MG/DL (ref 8.8–10.2)
CHLORIDE SERPL-SCNC: 105 MMOL/L (ref 98–107)
CREAT SERPL-MCNC: 0.59 MG/DL (ref 0.67–1.17)
CREAT SERPL-MCNC: 0.65 MG/DL (ref 0.67–1.17)
CREAT SERPL-MCNC: 0.66 MG/DL (ref 0.67–1.17)
D DIMER PPP FEU-MCNC: 2.01 UG/ML FEU (ref 0–0.5)
D DIMER PPP FEU-MCNC: 2.35 UG/ML FEU (ref 0–0.5)
D DIMER PPP FEU-MCNC: NORMAL
DEPRECATED HCO3 PLAS-SCNC: 20 MMOL/L (ref 22–29)
DEPRECATED HCO3 PLAS-SCNC: 20 MMOL/L (ref 22–29)
DEPRECATED HCO3 PLAS-SCNC: 21 MMOL/L (ref 22–29)
DIASTOLIC BLOOD PRESSURE - MUSE: NORMAL MMHG
EOSINOPHIL # BLD AUTO: 0.3 10E3/UL (ref 0–0.7)
EOSINOPHIL # BLD AUTO: 0.5 10E3/UL (ref 0–0.7)
EOSINOPHIL # BLD AUTO: 0.5 10E3/UL (ref 0–0.7)
EOSINOPHIL NFR BLD AUTO: 2 %
EOSINOPHIL NFR BLD AUTO: 5 %
EOSINOPHIL NFR BLD AUTO: 5 %
ERYTHROCYTE [DISTWIDTH] IN BLOOD BY AUTOMATED COUNT: 18.6 % (ref 10–15)
ERYTHROCYTE [DISTWIDTH] IN BLOOD BY AUTOMATED COUNT: 18.6 % (ref 10–15)
ERYTHROCYTE [DISTWIDTH] IN BLOOD BY AUTOMATED COUNT: 18.7 % (ref 10–15)
FIBRINOGEN PPP-MCNC: 393 MG/DL (ref 170–490)
FIBRINOGEN PPP-MCNC: 406 MG/DL (ref 170–490)
FIBRINOGEN PPP-MCNC: NORMAL MG/DL
GFR SERPL CREATININE-BSD FRML MDRD: >90 ML/MIN/1.73M2
GLUCOSE BLDC GLUCOMTR-MCNC: 101 MG/DL (ref 70–99)
GLUCOSE BLDC GLUCOMTR-MCNC: 101 MG/DL (ref 70–99)
GLUCOSE BLDC GLUCOMTR-MCNC: 110 MG/DL (ref 70–99)
GLUCOSE BLDC GLUCOMTR-MCNC: 111 MG/DL (ref 70–99)
GLUCOSE BLDC GLUCOMTR-MCNC: 113 MG/DL (ref 70–99)
GLUCOSE BLDC GLUCOMTR-MCNC: 122 MG/DL (ref 70–99)
GLUCOSE BLDC GLUCOMTR-MCNC: 125 MG/DL (ref 70–99)
GLUCOSE BLDC GLUCOMTR-MCNC: 146 MG/DL (ref 70–99)
GLUCOSE SERPL-MCNC: 108 MG/DL (ref 70–99)
GLUCOSE SERPL-MCNC: 123 MG/DL (ref 70–99)
GLUCOSE SERPL-MCNC: 99 MG/DL (ref 70–99)
HCO3 BLD-SCNC: 21 MMOL/L (ref 21–28)
HCO3 BLD-SCNC: 21 MMOL/L (ref 21–28)
HCO3 BLD-SCNC: 22 MMOL/L (ref 21–28)
HCO3 BLD-SCNC: 23 MMOL/L (ref 21–28)
HCO3 BLDV-SCNC: 25 MMOL/L (ref 21–28)
HCT VFR BLD AUTO: 27.1 % (ref 40–53)
HCT VFR BLD AUTO: 27.8 % (ref 40–53)
HCT VFR BLD AUTO: 28.3 % (ref 40–53)
HGB BLD-MCNC: 9 G/DL (ref 13.3–17.7)
HGB BLD-MCNC: 9.3 G/DL (ref 13.3–17.7)
HGB BLD-MCNC: 9.5 G/DL (ref 13.3–17.7)
HGB FREE PLAS-MCNC: 30 MG/DL
IMM GRANULOCYTES # BLD: 0.1 10E3/UL
IMM GRANULOCYTES # BLD: 0.1 10E3/UL
IMM GRANULOCYTES # BLD: 0.2 10E3/UL
IMM GRANULOCYTES NFR BLD: 1 %
IMM GRANULOCYTES NFR BLD: 1 %
IMM GRANULOCYTES NFR BLD: 2 %
INR PPP: 1.23 (ref 0.85–1.15)
INR PPP: 1.29 (ref 0.85–1.15)
INR PPP: 1.3 (ref 0.85–1.15)
INR PPP: NORMAL
INTERPRETATION ECG - MUSE: NORMAL
LACTATE SERPL-SCNC: 0.7 MMOL/L (ref 0.7–2)
LACTATE SERPL-SCNC: 1.5 MMOL/L (ref 0.7–2)
LACTATE SERPL-SCNC: 1.6 MMOL/L (ref 0.7–2)
LYMPHOCYTES # BLD AUTO: 0.5 10E3/UL (ref 0.8–5.3)
LYMPHOCYTES # BLD AUTO: 0.6 10E3/UL (ref 0.8–5.3)
LYMPHOCYTES # BLD AUTO: 0.7 10E3/UL (ref 0.8–5.3)
LYMPHOCYTES NFR BLD AUTO: 4 %
LYMPHOCYTES NFR BLD AUTO: 6 %
LYMPHOCYTES NFR BLD AUTO: 7 %
MAGNESIUM SERPL-MCNC: 1.8 MG/DL (ref 1.7–2.3)
MCH RBC QN AUTO: 28.9 PG (ref 26.5–33)
MCH RBC QN AUTO: 29 PG (ref 26.5–33)
MCH RBC QN AUTO: 29.2 PG (ref 26.5–33)
MCHC RBC AUTO-ENTMCNC: 33.2 G/DL (ref 31.5–36.5)
MCHC RBC AUTO-ENTMCNC: 33.5 G/DL (ref 31.5–36.5)
MCHC RBC AUTO-ENTMCNC: 33.6 G/DL (ref 31.5–36.5)
MCV RBC AUTO: 86 FL (ref 78–100)
MCV RBC AUTO: 86 FL (ref 78–100)
MCV RBC AUTO: 88 FL (ref 78–100)
MDC_IDC_EPISODE_DTM: NORMAL
MDC_IDC_EPISODE_DURATION: 176 S
MDC_IDC_EPISODE_DURATION: 79 S
MDC_IDC_EPISODE_DURATION: 8 S
MDC_IDC_EPISODE_ID: 26
MDC_IDC_EPISODE_ID: 27
MDC_IDC_EPISODE_ID: 28
MDC_IDC_EPISODE_TYPE: NORMAL
MDC_IDC_LEAD_IMPLANT_DT: NORMAL
MDC_IDC_LEAD_IMPLANT_DT: NORMAL
MDC_IDC_LEAD_LOCATION: NORMAL
MDC_IDC_LEAD_LOCATION: NORMAL
MDC_IDC_LEAD_LOCATION_DETAIL_1: NORMAL
MDC_IDC_LEAD_LOCATION_DETAIL_1: NORMAL
MDC_IDC_LEAD_MFG: NORMAL
MDC_IDC_LEAD_MFG: NORMAL
MDC_IDC_LEAD_MODEL: NORMAL
MDC_IDC_LEAD_MODEL: NORMAL
MDC_IDC_LEAD_POLARITY_TYPE: NORMAL
MDC_IDC_LEAD_POLARITY_TYPE: NORMAL
MDC_IDC_LEAD_SERIAL: NORMAL
MDC_IDC_LEAD_SERIAL: NORMAL
MDC_IDC_MSMT_BATTERY_DTM: NORMAL
MDC_IDC_MSMT_BATTERY_REMAINING_LONGEVITY: 63 MO
MDC_IDC_MSMT_BATTERY_RRT_TRIGGER: 2.73
MDC_IDC_MSMT_BATTERY_STATUS: NORMAL
MDC_IDC_MSMT_BATTERY_VOLTAGE: 2.96 V
MDC_IDC_MSMT_CAP_CHARGE_DTM: NORMAL
MDC_IDC_MSMT_CAP_CHARGE_ENERGY: 18 J
MDC_IDC_MSMT_CAP_CHARGE_TIME: 3.97
MDC_IDC_MSMT_CAP_CHARGE_TYPE: NORMAL
MDC_IDC_MSMT_LEADCHNL_RA_IMPEDANCE_VALUE: 285 OHM
MDC_IDC_MSMT_LEADCHNL_RA_PACING_THRESHOLD_AMPLITUDE: 0.75 V
MDC_IDC_MSMT_LEADCHNL_RA_PACING_THRESHOLD_PULSEWIDTH: 0.4 MS
MDC_IDC_MSMT_LEADCHNL_RA_SENSING_INTR_AMPL: 0.5 MV
MDC_IDC_MSMT_LEADCHNL_RV_IMPEDANCE_VALUE: 551 OHM
MDC_IDC_MSMT_LEADCHNL_RV_IMPEDANCE_VALUE: 760 OHM
MDC_IDC_MSMT_LEADCHNL_RV_PACING_THRESHOLD_AMPLITUDE: 2.75 V
MDC_IDC_MSMT_LEADCHNL_RV_PACING_THRESHOLD_PULSEWIDTH: 0.4 MS
MDC_IDC_MSMT_LEADCHNL_RV_SENSING_INTR_AMPL: 1.6 MV
MDC_IDC_PG_IMPLANT_DTM: NORMAL
MDC_IDC_PG_MFG: NORMAL
MDC_IDC_PG_MODEL: NORMAL
MDC_IDC_PG_SERIAL: NORMAL
MDC_IDC_PG_TYPE: NORMAL
MDC_IDC_SESS_CLINIC_NAME: NORMAL
MDC_IDC_SESS_DTM: NORMAL
MDC_IDC_SESS_TYPE: NORMAL
MDC_IDC_SET_BRADY_AT_MODE_SWITCH_RATE: 171 {BEATS}/MIN
MDC_IDC_SET_BRADY_HYSTRATE: NORMAL
MDC_IDC_SET_BRADY_LOWRATE: 50 {BEATS}/MIN
MDC_IDC_SET_BRADY_MAX_SENSOR_RATE: 115 {BEATS}/MIN
MDC_IDC_SET_BRADY_MAX_TRACKING_RATE: 130 {BEATS}/MIN
MDC_IDC_SET_BRADY_MODE: NORMAL
MDC_IDC_SET_BRADY_PAV_DELAY_LOW: 350 MS
MDC_IDC_SET_BRADY_SAV_DELAY_LOW: 350 MS
MDC_IDC_SET_LEADCHNL_RA_PACING_AMPLITUDE: 1.5 V
MDC_IDC_SET_LEADCHNL_RA_PACING_ANODE_ELECTRODE_1: NORMAL
MDC_IDC_SET_LEADCHNL_RA_PACING_ANODE_LOCATION_1: NORMAL
MDC_IDC_SET_LEADCHNL_RA_PACING_CAPTURE_MODE: NORMAL
MDC_IDC_SET_LEADCHNL_RA_PACING_CATHODE_ELECTRODE_1: NORMAL
MDC_IDC_SET_LEADCHNL_RA_PACING_CATHODE_LOCATION_1: NORMAL
MDC_IDC_SET_LEADCHNL_RA_PACING_POLARITY: NORMAL
MDC_IDC_SET_LEADCHNL_RA_PACING_PULSEWIDTH: 0.4 MS
MDC_IDC_SET_LEADCHNL_RA_SENSING_ANODE_ELECTRODE_1: NORMAL
MDC_IDC_SET_LEADCHNL_RA_SENSING_ANODE_LOCATION_1: NORMAL
MDC_IDC_SET_LEADCHNL_RA_SENSING_CATHODE_ELECTRODE_1: NORMAL
MDC_IDC_SET_LEADCHNL_RA_SENSING_CATHODE_LOCATION_1: NORMAL
MDC_IDC_SET_LEADCHNL_RA_SENSING_POLARITY: NORMAL
MDC_IDC_SET_LEADCHNL_RA_SENSING_SENSITIVITY: 0.3 MV
MDC_IDC_SET_LEADCHNL_RV_PACING_AMPLITUDE: 4 V
MDC_IDC_SET_LEADCHNL_RV_PACING_ANODE_ELECTRODE_1: NORMAL
MDC_IDC_SET_LEADCHNL_RV_PACING_ANODE_LOCATION_1: NORMAL
MDC_IDC_SET_LEADCHNL_RV_PACING_CAPTURE_MODE: NORMAL
MDC_IDC_SET_LEADCHNL_RV_PACING_CATHODE_ELECTRODE_1: NORMAL
MDC_IDC_SET_LEADCHNL_RV_PACING_CATHODE_LOCATION_1: NORMAL
MDC_IDC_SET_LEADCHNL_RV_PACING_POLARITY: NORMAL
MDC_IDC_SET_LEADCHNL_RV_PACING_PULSEWIDTH: 0.4 MS
MDC_IDC_SET_LEADCHNL_RV_SENSING_ANODE_ELECTRODE_1: NORMAL
MDC_IDC_SET_LEADCHNL_RV_SENSING_ANODE_LOCATION_1: NORMAL
MDC_IDC_SET_LEADCHNL_RV_SENSING_CATHODE_ELECTRODE_1: NORMAL
MDC_IDC_SET_LEADCHNL_RV_SENSING_CATHODE_LOCATION_1: NORMAL
MDC_IDC_SET_LEADCHNL_RV_SENSING_POLARITY: NORMAL
MDC_IDC_SET_LEADCHNL_RV_SENSING_SENSITIVITY: 0.3 MV
MDC_IDC_SET_ZONE_DETECTION_BEATS_DENOMINATOR: 40 {BEATS}
MDC_IDC_SET_ZONE_DETECTION_BEATS_NUMERATOR: 30 {BEATS}
MDC_IDC_SET_ZONE_DETECTION_INTERVAL: 280 MS
MDC_IDC_SET_ZONE_DETECTION_INTERVAL: 320 MS
MDC_IDC_SET_ZONE_DETECTION_INTERVAL: 350 MS
MDC_IDC_SET_ZONE_DETECTION_INTERVAL: 350 MS
MDC_IDC_SET_ZONE_DETECTION_INTERVAL: 400 MS
MDC_IDC_SET_ZONE_TYPE: NORMAL
MDC_IDC_STAT_AT_BURDEN_PERCENT: 1.1 %
MDC_IDC_STAT_AT_DTM_END: NORMAL
MDC_IDC_STAT_AT_DTM_START: NORMAL
MDC_IDC_STAT_BRADY_AP_VP_PERCENT: 0.52 %
MDC_IDC_STAT_BRADY_AP_VS_PERCENT: 0.21 %
MDC_IDC_STAT_BRADY_AS_VP_PERCENT: 7.6 %
MDC_IDC_STAT_BRADY_AS_VS_PERCENT: 91.67 %
MDC_IDC_STAT_BRADY_DTM_END: NORMAL
MDC_IDC_STAT_BRADY_DTM_START: NORMAL
MDC_IDC_STAT_BRADY_RA_PERCENT_PACED: 0.69 %
MDC_IDC_STAT_BRADY_RV_PERCENT_PACED: 7.74 %
MDC_IDC_STAT_EPISODE_RECENT_COUNT: 0
MDC_IDC_STAT_EPISODE_RECENT_COUNT: 3
MDC_IDC_STAT_EPISODE_RECENT_COUNT_DTM_END: NORMAL
MDC_IDC_STAT_EPISODE_RECENT_COUNT_DTM_START: NORMAL
MDC_IDC_STAT_EPISODE_TOTAL_COUNT: 0
MDC_IDC_STAT_EPISODE_TOTAL_COUNT: 1
MDC_IDC_STAT_EPISODE_TOTAL_COUNT: 1
MDC_IDC_STAT_EPISODE_TOTAL_COUNT: 5
MDC_IDC_STAT_EPISODE_TOTAL_COUNT: 9
MDC_IDC_STAT_EPISODE_TOTAL_COUNT_DTM_END: NORMAL
MDC_IDC_STAT_EPISODE_TOTAL_COUNT_DTM_START: NORMAL
MDC_IDC_STAT_EPISODE_TYPE: NORMAL
MDC_IDC_STAT_TACHYTHERAPY_ATP_DELIVERED_RECENT: 0
MDC_IDC_STAT_TACHYTHERAPY_ATP_DELIVERED_TOTAL: 0
MDC_IDC_STAT_TACHYTHERAPY_RECENT_DTM_END: NORMAL
MDC_IDC_STAT_TACHYTHERAPY_RECENT_DTM_START: NORMAL
MDC_IDC_STAT_TACHYTHERAPY_SHOCKS_ABORTED_RECENT: 0
MDC_IDC_STAT_TACHYTHERAPY_SHOCKS_ABORTED_TOTAL: 0
MDC_IDC_STAT_TACHYTHERAPY_SHOCKS_DELIVERED_RECENT: 0
MDC_IDC_STAT_TACHYTHERAPY_SHOCKS_DELIVERED_TOTAL: 1
MDC_IDC_STAT_TACHYTHERAPY_TOTAL_DTM_END: NORMAL
MDC_IDC_STAT_TACHYTHERAPY_TOTAL_DTM_START: NORMAL
MONOCYTES # BLD AUTO: 0.7 10E3/UL (ref 0–1.3)
MONOCYTES # BLD AUTO: 0.8 10E3/UL (ref 0–1.3)
MONOCYTES # BLD AUTO: 0.9 10E3/UL (ref 0–1.3)
MONOCYTES NFR BLD AUTO: 7 %
MONOCYTES NFR BLD AUTO: 8 %
MONOCYTES NFR BLD AUTO: 8 %
NEUTROPHILS # BLD AUTO: 7.6 10E3/UL (ref 1.6–8.3)
NEUTROPHILS # BLD AUTO: 7.8 10E3/UL (ref 1.6–8.3)
NEUTROPHILS # BLD AUTO: 9.5 10E3/UL (ref 1.6–8.3)
NEUTROPHILS NFR BLD AUTO: 79 %
NEUTROPHILS NFR BLD AUTO: 79 %
NEUTROPHILS NFR BLD AUTO: 83 %
NRBC # BLD AUTO: 0 10E3/UL
NRBC BLD AUTO-RTO: 0 /100
O2/TOTAL GAS SETTING VFR VENT: 40 %
O2/TOTAL GAS SETTING VFR VENT: 50 %
OXYHGB MFR BLD: 97 % (ref 92–100)
OXYHGB MFR BLD: 97 % (ref 92–100)
OXYHGB MFR BLD: 98 % (ref 92–100)
OXYHGB MFR BLDV: 64 %
P AXIS - MUSE: 8 DEGREES
P AXIS - MUSE: NORMAL DEGREES
P AXIS - MUSE: NORMAL DEGREES
PCO2 BLD: 30 MM HG (ref 35–45)
PCO2 BLD: 32 MM HG (ref 35–45)
PCO2 BLD: 33 MM HG (ref 35–45)
PCO2 BLD: 35 MM HG (ref 35–45)
PCO2 BLD: 36 MM HG (ref 35–45)
PCO2 BLD: 36 MM HG (ref 35–45)
PCO2 BLDV: 40 MM HG (ref 40–50)
PH BLD: 7.4 [PH] (ref 7.35–7.45)
PH BLD: 7.4 [PH] (ref 7.35–7.45)
PH BLD: 7.41 [PH] (ref 7.35–7.45)
PH BLD: 7.43 [PH] (ref 7.35–7.45)
PH BLD: 7.44 [PH] (ref 7.35–7.45)
PH BLD: 7.46 [PH] (ref 7.35–7.45)
PH BLDV: 7.4 [PH] (ref 7.32–7.43)
PHOSPHATE SERPL-MCNC: 3.7 MG/DL (ref 2.5–4.5)
PLATELET # BLD AUTO: 73 10E3/UL (ref 150–450)
PLATELET # BLD AUTO: 76 10E3/UL (ref 150–450)
PLATELET # BLD AUTO: 77 10E3/UL (ref 150–450)
PO2 BLD: 155 MM HG (ref 80–105)
PO2 BLD: 161 MM HG (ref 80–105)
PO2 BLD: 170 MM HG (ref 80–105)
PO2 BLD: 170 MM HG (ref 80–105)
PO2 BLD: 186 MM HG (ref 80–105)
PO2 BLD: 189 MM HG (ref 80–105)
PO2 BLDV: 35 MM HG (ref 25–47)
POTASSIUM SERPL-SCNC: 3.5 MMOL/L (ref 3.4–5.3)
POTASSIUM SERPL-SCNC: 3.8 MMOL/L (ref 3.4–5.3)
POTASSIUM SERPL-SCNC: 4.2 MMOL/L (ref 3.4–5.3)
POTASSIUM SERPL-SCNC: 4.9 MMOL/L (ref 3.4–5.3)
PR INTERVAL - MUSE: NORMAL MS
QRS DURATION - MUSE: 158 MS
QRS DURATION - MUSE: 180 MS
QRS DURATION - MUSE: 180 MS
QT - MUSE: 432 MS
QT - MUSE: 484 MS
QT - MUSE: 544 MS
QTC - MUSE: 510 MS
QTC - MUSE: 605 MS
QTC - MUSE: 607 MS
R AXIS - MUSE: -43 DEGREES
R AXIS - MUSE: 143 DEGREES
R AXIS - MUSE: 253 DEGREES
RBC # BLD AUTO: 3.08 10E6/UL (ref 4.4–5.9)
RBC # BLD AUTO: 3.22 10E6/UL (ref 4.4–5.9)
RBC # BLD AUTO: 3.28 10E6/UL (ref 4.4–5.9)
SODIUM SERPL-SCNC: 134 MMOL/L (ref 136–145)
SODIUM SERPL-SCNC: 135 MMOL/L (ref 136–145)
SODIUM SERPL-SCNC: 135 MMOL/L (ref 136–145)
SYSTOLIC BLOOD PRESSURE - MUSE: NORMAL MMHG
T AXIS - MUSE: -33 DEGREES
T AXIS - MUSE: 123 DEGREES
T AXIS - MUSE: 169 DEGREES
TRIGL SERPL-MCNC: 74 MG/DL
UFH PPP CHRO-ACNC: <0.1 IU/ML
UFH PPP CHRO-ACNC: NORMAL
VENTRICULAR RATE- MUSE: 75 BPM
VENTRICULAR RATE- MUSE: 84 BPM
VENTRICULAR RATE- MUSE: 94 BPM
WBC # BLD AUTO: 11.5 10E3/UL (ref 4–11)
WBC # BLD AUTO: 9.5 10E3/UL (ref 4–11)
WBC # BLD AUTO: 9.8 10E3/UL (ref 4–11)

## 2022-07-11 PROCEDURE — 84478 ASSAY OF TRIGLYCERIDES: CPT | Performed by: THORACIC SURGERY (CARDIOTHORACIC VASCULAR SURGERY)

## 2022-07-11 PROCEDURE — 94003 VENT MGMT INPAT SUBQ DAY: CPT

## 2022-07-11 PROCEDURE — 85025 COMPLETE CBC W/AUTO DIFF WBC: CPT | Performed by: STUDENT IN AN ORGANIZED HEALTH CARE EDUCATION/TRAINING PROGRAM

## 2022-07-11 PROCEDURE — 84132 ASSAY OF SERUM POTASSIUM: CPT | Performed by: STUDENT IN AN ORGANIZED HEALTH CARE EDUCATION/TRAINING PROGRAM

## 2022-07-11 PROCEDURE — 370N000017 HC ANESTHESIA TECHNICAL FEE, PER MIN: Performed by: SURGERY

## 2022-07-11 PROCEDURE — 82330 ASSAY OF CALCIUM: CPT | Performed by: SURGERY

## 2022-07-11 PROCEDURE — 85520 HEPARIN ASSAY: CPT | Performed by: SURGERY

## 2022-07-11 PROCEDURE — 82805 BLOOD GASES W/O2 SATURATION: CPT | Performed by: STUDENT IN AN ORGANIZED HEALTH CARE EDUCATION/TRAINING PROGRAM

## 2022-07-11 PROCEDURE — 250N000013 HC RX MED GY IP 250 OP 250 PS 637: Performed by: ANESTHESIOLOGY

## 2022-07-11 PROCEDURE — 87040 BLOOD CULTURE FOR BACTERIA: CPT | Performed by: STUDENT IN AN ORGANIZED HEALTH CARE EDUCATION/TRAINING PROGRAM

## 2022-07-11 PROCEDURE — 0DH90UZ INSERTION OF FEEDING DEVICE INTO DUODENUM, OPEN APPROACH: ICD-10-PCS | Performed by: STUDENT IN AN ORGANIZED HEALTH CARE EDUCATION/TRAINING PROGRAM

## 2022-07-11 PROCEDURE — 44500 INTRO GASTROINTESTINAL TUBE: CPT

## 2022-07-11 PROCEDURE — 21750 REPAIR OF STERNUM SEPARATION: CPT | Mod: GC | Performed by: SURGERY

## 2022-07-11 PROCEDURE — 250N000011 HC RX IP 250 OP 636: Performed by: STUDENT IN AN ORGANIZED HEALTH CARE EDUCATION/TRAINING PROGRAM

## 2022-07-11 PROCEDURE — 250N000009 HC RX 250: Performed by: NURSE ANESTHETIST, CERTIFIED REGISTERED

## 2022-07-11 PROCEDURE — 85520 HEPARIN ASSAY: CPT | Performed by: THORACIC SURGERY (CARDIOTHORACIC VASCULAR SURGERY)

## 2022-07-11 PROCEDURE — 999N000015 HC STATISTIC ARTERIAL MONITORING DAILY

## 2022-07-11 PROCEDURE — 94640 AIRWAY INHALATION TREATMENT: CPT | Mod: 76

## 2022-07-11 PROCEDURE — 82330 ASSAY OF CALCIUM: CPT | Performed by: STUDENT IN AN ORGANIZED HEALTH CARE EDUCATION/TRAINING PROGRAM

## 2022-07-11 PROCEDURE — 250N000013 HC RX MED GY IP 250 OP 250 PS 637: Performed by: STUDENT IN AN ORGANIZED HEALTH CARE EDUCATION/TRAINING PROGRAM

## 2022-07-11 PROCEDURE — 85025 COMPLETE CBC W/AUTO DIFF WBC: CPT | Performed by: SURGERY

## 2022-07-11 PROCEDURE — 82803 BLOOD GASES ANY COMBINATION: CPT | Performed by: SURGERY

## 2022-07-11 PROCEDURE — 82310 ASSAY OF CALCIUM: CPT | Performed by: STUDENT IN AN ORGANIZED HEALTH CARE EDUCATION/TRAINING PROGRAM

## 2022-07-11 PROCEDURE — 272N000001 HC OR GENERAL SUPPLY STERILE: Performed by: SURGERY

## 2022-07-11 PROCEDURE — 85025 COMPLETE CBC W/AUTO DIFF WBC: CPT | Performed by: THORACIC SURGERY (CARDIOTHORACIC VASCULAR SURGERY)

## 2022-07-11 PROCEDURE — 82805 BLOOD GASES W/O2 SATURATION: CPT | Performed by: SURGERY

## 2022-07-11 PROCEDURE — 44500 INTRO GASTROINTESTINAL TUBE: CPT | Mod: GC | Performed by: STUDENT IN AN ORGANIZED HEALTH CARE EDUCATION/TRAINING PROGRAM

## 2022-07-11 PROCEDURE — 85379 FIBRIN DEGRADATION QUANT: CPT | Performed by: SURGERY

## 2022-07-11 PROCEDURE — 82310 ASSAY OF CALCIUM: CPT | Performed by: SURGERY

## 2022-07-11 PROCEDURE — 250N000011 HC RX IP 250 OP 636: Performed by: NURSE ANESTHETIST, CERTIFIED REGISTERED

## 2022-07-11 PROCEDURE — 999N000185 HC STATISTIC TRANSPORT TIME EA 15 MIN

## 2022-07-11 PROCEDURE — 94640 AIRWAY INHALATION TREATMENT: CPT

## 2022-07-11 PROCEDURE — 83605 ASSAY OF LACTIC ACID: CPT | Performed by: STUDENT IN AN ORGANIZED HEALTH CARE EDUCATION/TRAINING PROGRAM

## 2022-07-11 PROCEDURE — 250N000024 HC ISOFLURANE, PER MIN: Performed by: SURGERY

## 2022-07-11 PROCEDURE — 250N000009 HC RX 250: Performed by: STUDENT IN AN ORGANIZED HEALTH CARE EDUCATION/TRAINING PROGRAM

## 2022-07-11 PROCEDURE — 85347 COAGULATION TIME ACTIVATED: CPT

## 2022-07-11 PROCEDURE — 94799 UNLISTED PULMONARY SVC/PX: CPT

## 2022-07-11 PROCEDURE — 250N000013 HC RX MED GY IP 250 OP 250 PS 637: Performed by: THORACIC SURGERY (CARDIOTHORACIC VASCULAR SURGERY)

## 2022-07-11 PROCEDURE — 250N000011 HC RX IP 250 OP 636: Performed by: SURGERY

## 2022-07-11 PROCEDURE — 71045 X-RAY EXAM CHEST 1 VIEW: CPT | Mod: 26 | Performed by: RADIOLOGY

## 2022-07-11 PROCEDURE — 258N000003 HC RX IP 258 OP 636: Performed by: SURGERY

## 2022-07-11 PROCEDURE — 85730 THROMBOPLASTIN TIME PARTIAL: CPT | Performed by: THORACIC SURGERY (CARDIOTHORACIC VASCULAR SURGERY)

## 2022-07-11 PROCEDURE — 250N000013 HC RX MED GY IP 250 OP 250 PS 637: Performed by: SURGERY

## 2022-07-11 PROCEDURE — 33948 ECMO/ECLS DAILY MGMT-VENOUS: CPT

## 2022-07-11 PROCEDURE — 85520 HEPARIN ASSAY: CPT | Performed by: STUDENT IN AN ORGANIZED HEALTH CARE EDUCATION/TRAINING PROGRAM

## 2022-07-11 PROCEDURE — 258N000003 HC RX IP 258 OP 636: Performed by: NURSE ANESTHETIST, CERTIFIED REGISTERED

## 2022-07-11 PROCEDURE — 84460 ALANINE AMINO (ALT) (SGPT): CPT | Performed by: STUDENT IN AN ORGANIZED HEALTH CARE EDUCATION/TRAINING PROGRAM

## 2022-07-11 PROCEDURE — 82803 BLOOD GASES ANY COMBINATION: CPT | Performed by: THORACIC SURGERY (CARDIOTHORACIC VASCULAR SURGERY)

## 2022-07-11 PROCEDURE — 85730 THROMBOPLASTIN TIME PARTIAL: CPT | Performed by: SURGERY

## 2022-07-11 PROCEDURE — 85610 PROTHROMBIN TIME: CPT | Performed by: THORACIC SURGERY (CARDIOTHORACIC VASCULAR SURGERY)

## 2022-07-11 PROCEDURE — 85379 FIBRIN DEGRADATION QUANT: CPT | Performed by: THORACIC SURGERY (CARDIOTHORACIC VASCULAR SURGERY)

## 2022-07-11 PROCEDURE — 258N000003 HC RX IP 258 OP 636: Performed by: THORACIC SURGERY (CARDIOTHORACIC VASCULAR SURGERY)

## 2022-07-11 PROCEDURE — 36415 COLL VENOUS BLD VENIPUNCTURE: CPT | Performed by: STUDENT IN AN ORGANIZED HEALTH CARE EDUCATION/TRAINING PROGRAM

## 2022-07-11 PROCEDURE — 250N000011 HC RX IP 250 OP 636: Performed by: THORACIC SURGERY (CARDIOTHORACIC VASCULAR SURGERY)

## 2022-07-11 PROCEDURE — 999N000065 XR ABDOMEN PORT 1 VIEWS

## 2022-07-11 PROCEDURE — 83051 HEMOGLOBIN PLASMA: CPT | Performed by: STUDENT IN AN ORGANIZED HEALTH CARE EDUCATION/TRAINING PROGRAM

## 2022-07-11 PROCEDURE — 99291 CRITICAL CARE FIRST HOUR: CPT | Mod: 24 | Performed by: ANESTHESIOLOGY

## 2022-07-11 PROCEDURE — 85384 FIBRINOGEN ACTIVITY: CPT | Performed by: SURGERY

## 2022-07-11 PROCEDURE — 85300 ANTITHROMBIN III ACTIVITY: CPT | Performed by: THORACIC SURGERY (CARDIOTHORACIC VASCULAR SURGERY)

## 2022-07-11 PROCEDURE — 84132 ASSAY OF SERUM POTASSIUM: CPT | Performed by: SURGERY

## 2022-07-11 PROCEDURE — 83735 ASSAY OF MAGNESIUM: CPT | Performed by: STUDENT IN AN ORGANIZED HEALTH CARE EDUCATION/TRAINING PROGRAM

## 2022-07-11 PROCEDURE — 999N000063 XR CHEST PORT 1 VIEW

## 2022-07-11 PROCEDURE — 999N000157 HC STATISTIC RCP TIME EA 10 MIN

## 2022-07-11 PROCEDURE — 360N000076 HC SURGERY LEVEL 3, PER MIN: Performed by: SURGERY

## 2022-07-11 PROCEDURE — 93287 PERI-PX DEVICE EVAL & PRGR: CPT | Mod: 26 | Performed by: INTERNAL MEDICINE

## 2022-07-11 PROCEDURE — 99233 SBSQ HOSP IP/OBS HIGH 50: CPT | Mod: GC | Performed by: INTERNAL MEDICINE

## 2022-07-11 PROCEDURE — 85730 THROMBOPLASTIN TIME PARTIAL: CPT | Performed by: STUDENT IN AN ORGANIZED HEALTH CARE EDUCATION/TRAINING PROGRAM

## 2022-07-11 PROCEDURE — 200N000002 HC R&B ICU UMMC

## 2022-07-11 PROCEDURE — 85384 FIBRINOGEN ACTIVITY: CPT | Performed by: STUDENT IN AN ORGANIZED HEALTH CARE EDUCATION/TRAINING PROGRAM

## 2022-07-11 PROCEDURE — 93287 PERI-PX DEVICE EVAL & PRGR: CPT

## 2022-07-11 PROCEDURE — 85384 FIBRINOGEN ACTIVITY: CPT | Performed by: THORACIC SURGERY (CARDIOTHORACIC VASCULAR SURGERY)

## 2022-07-11 PROCEDURE — 82805 BLOOD GASES W/O2 SATURATION: CPT | Performed by: ANESTHESIOLOGY

## 2022-07-11 PROCEDURE — 84100 ASSAY OF PHOSPHORUS: CPT | Performed by: STUDENT IN AN ORGANIZED HEALTH CARE EDUCATION/TRAINING PROGRAM

## 2022-07-11 PROCEDURE — 74018 RADEX ABDOMEN 1 VIEW: CPT | Mod: 26 | Performed by: RADIOLOGY

## 2022-07-11 PROCEDURE — 85379 FIBRIN DEGRADATION QUANT: CPT | Performed by: STUDENT IN AN ORGANIZED HEALTH CARE EDUCATION/TRAINING PROGRAM

## 2022-07-11 PROCEDURE — 85610 PROTHROMBIN TIME: CPT | Performed by: STUDENT IN AN ORGANIZED HEALTH CARE EDUCATION/TRAINING PROGRAM

## 2022-07-11 PROCEDURE — 71045 X-RAY EXAM CHEST 1 VIEW: CPT

## 2022-07-11 PROCEDURE — 80069 RENAL FUNCTION PANEL: CPT | Performed by: STUDENT IN AN ORGANIZED HEALTH CARE EDUCATION/TRAINING PROGRAM

## 2022-07-11 PROCEDURE — 74340 X-RAY GUIDE FOR GI TUBE: CPT | Mod: 26 | Performed by: STUDENT IN AN ORGANIZED HEALTH CARE EDUCATION/TRAINING PROGRAM

## 2022-07-11 PROCEDURE — 85610 PROTHROMBIN TIME: CPT | Performed by: SURGERY

## 2022-07-11 PROCEDURE — 250N000009 HC RX 250: Performed by: SURGERY

## 2022-07-11 PROCEDURE — 83605 ASSAY OF LACTIC ACID: CPT | Performed by: SURGERY

## 2022-07-11 RX ORDER — POTASSIUM CHLORIDE 1.5 G/1.58G
20 POWDER, FOR SOLUTION ORAL ONCE
Status: COMPLETED | OUTPATIENT
Start: 2022-07-11 | End: 2022-07-11

## 2022-07-11 RX ORDER — SODIUM CHLORIDE, SODIUM GLUCONATE, SODIUM ACETATE, POTASSIUM CHLORIDE AND MAGNESIUM CHLORIDE 526; 502; 368; 37; 30 MG/100ML; MG/100ML; MG/100ML; MG/100ML; MG/100ML
INJECTION, SOLUTION INTRAVENOUS CONTINUOUS PRN
Status: DISCONTINUED | OUTPATIENT
Start: 2022-07-11 | End: 2022-07-11

## 2022-07-11 RX ORDER — FENTANYL CITRATE 50 UG/ML
INJECTION, SOLUTION INTRAMUSCULAR; INTRAVENOUS PRN
Status: DISCONTINUED | OUTPATIENT
Start: 2022-07-11 | End: 2022-07-11

## 2022-07-11 RX ORDER — FUROSEMIDE 10 MG/ML
40 INJECTION INTRAMUSCULAR; INTRAVENOUS ONCE
Status: COMPLETED | OUTPATIENT
Start: 2022-07-11 | End: 2022-07-11

## 2022-07-11 RX ORDER — PROPOFOL 10 MG/ML
INJECTION, EMULSION INTRAVENOUS PRN
Status: DISCONTINUED | OUTPATIENT
Start: 2022-07-11 | End: 2022-07-11

## 2022-07-11 RX ORDER — ACETAMINOPHEN 325 MG/1
975 TABLET ORAL EVERY 8 HOURS SCHEDULED
Status: DISCONTINUED | OUTPATIENT
Start: 2022-07-11 | End: 2022-08-21 | Stop reason: HOSPADM

## 2022-07-11 RX ORDER — AMINO AC/PROTEIN HYDR/WHEY PRO 10G-100/30
1 LIQUID (ML) ORAL 3 TIMES DAILY
Status: DISCONTINUED | OUTPATIENT
Start: 2022-07-11 | End: 2022-08-09

## 2022-07-11 RX ORDER — LIDOCAINE HYDROCHLORIDE 20 MG/ML
5 SOLUTION OROPHARYNGEAL ONCE
Status: COMPLETED | OUTPATIENT
Start: 2022-07-11 | End: 2022-07-11

## 2022-07-11 RX ADMIN — VANCOMYCIN HYDROCHLORIDE 1250 MG: 10 INJECTION, POWDER, LYOPHILIZED, FOR SOLUTION INTRAVENOUS at 22:43

## 2022-07-11 RX ADMIN — FLUTICASONE PROPIONATE AND SALMETEROL XINAFOATE 2 PUFF: 115; 21 AEROSOL, METERED RESPIRATORY (INHALATION) at 07:25

## 2022-07-11 RX ADMIN — VANCOMYCIN HYDROCHLORIDE 1250 MG: 10 INJECTION, POWDER, LYOPHILIZED, FOR SOLUTION INTRAVENOUS at 09:32

## 2022-07-11 RX ADMIN — ACETAMINOPHEN 975 MG: 325 TABLET, FILM COATED ORAL at 22:07

## 2022-07-11 RX ADMIN — MIDAZOLAM 2 MG: 1 INJECTION INTRAMUSCULAR; INTRAVENOUS at 15:53

## 2022-07-11 RX ADMIN — POTASSIUM CHLORIDE 20 MEQ: 1.5 POWDER, FOR SOLUTION ORAL at 05:36

## 2022-07-11 RX ADMIN — CEFEPIME HYDROCHLORIDE 2 G: 2 INJECTION, POWDER, FOR SOLUTION INTRAVENOUS at 01:48

## 2022-07-11 RX ADMIN — LIDOCAINE HYDROCHLORIDE 7 ML: 20 SOLUTION ORAL; TOPICAL at 13:39

## 2022-07-11 RX ADMIN — FENTANYL CITRATE 50 MCG: 50 INJECTION, SOLUTION INTRAMUSCULAR; INTRAVENOUS at 17:25

## 2022-07-11 RX ADMIN — HYDRALAZINE HYDROCHLORIDE 20 MG: 10 TABLET, FILM COATED ORAL at 22:07

## 2022-07-11 RX ADMIN — PROPOFOL 60 MCG/KG/MIN: 10 INJECTION, EMULSION INTRAVENOUS at 03:03

## 2022-07-11 RX ADMIN — SUGAMMADEX 200 MG: 100 INJECTION, SOLUTION INTRAVENOUS at 18:26

## 2022-07-11 RX ADMIN — POTASSIUM CHLORIDE 20 MEQ: 1.5 POWDER, FOR SOLUTION ORAL at 00:55

## 2022-07-11 RX ADMIN — ACETAMINOPHEN 975 MG: 325 TABLET, FILM COATED ORAL at 07:23

## 2022-07-11 RX ADMIN — MUPIROCIN 1 G: 20 OINTMENT TOPICAL at 07:24

## 2022-07-11 RX ADMIN — CEFEPIME HYDROCHLORIDE 2 G: 2 INJECTION, POWDER, FOR SOLUTION INTRAVENOUS at 09:32

## 2022-07-11 RX ADMIN — DULOXETINE 30 MG: 30 CAPSULE, DELAYED RELEASE ORAL at 07:23

## 2022-07-11 RX ADMIN — FUROSEMIDE 40 MG: 10 INJECTION, SOLUTION INTRAVENOUS at 00:55

## 2022-07-11 RX ADMIN — NOREPINEPHRINE BITARTRATE 6.4 MCG: 1 INJECTION, SOLUTION, CONCENTRATE INTRAVENOUS at 16:45

## 2022-07-11 RX ADMIN — PROPOFOL 60 MCG/KG/MIN: 10 INJECTION, EMULSION INTRAVENOUS at 15:09

## 2022-07-11 RX ADMIN — POTASSIUM CHLORIDE 20 MEQ: 1.5 POWDER, FOR SOLUTION ORAL at 10:07

## 2022-07-11 RX ADMIN — MUPIROCIN 1 G: 20 OINTMENT TOPICAL at 21:46

## 2022-07-11 RX ADMIN — PROPOFOL 60 MCG/KG/MIN: 10 INJECTION, EMULSION INTRAVENOUS at 00:42

## 2022-07-11 RX ADMIN — Medication 50 MG: at 15:53

## 2022-07-11 RX ADMIN — Medication 1 PACKET: at 22:07

## 2022-07-11 RX ADMIN — PROPOFOL 30 MG: 10 INJECTION, EMULSION INTRAVENOUS at 16:56

## 2022-07-11 RX ADMIN — MULTIVITAMIN 15 ML: LIQUID ORAL at 07:23

## 2022-07-11 RX ADMIN — FENTANYL CITRATE 100 MCG: 50 INJECTION, SOLUTION INTRAMUSCULAR; INTRAVENOUS at 16:50

## 2022-07-11 RX ADMIN — PROPOFOL 60 MCG/KG/MIN: 10 INJECTION, EMULSION INTRAVENOUS at 11:06

## 2022-07-11 RX ADMIN — Medication 50 MG: at 16:40

## 2022-07-11 RX ADMIN — SENNOSIDES AND DOCUSATE SODIUM 1 TABLET: 8.6; 5 TABLET ORAL at 07:23

## 2022-07-11 RX ADMIN — SODIUM CHLORIDE, SODIUM GLUCONATE, SODIUM ACETATE, POTASSIUM CHLORIDE AND MAGNESIUM CHLORIDE: 526; 502; 368; 37; 30 INJECTION, SOLUTION INTRAVENOUS at 15:53

## 2022-07-11 RX ADMIN — Medication 150 MCG/HR: at 21:15

## 2022-07-11 RX ADMIN — POLYETHYLENE GLYCOL 3350 17 G: 17 POWDER, FOR SOLUTION ORAL at 07:23

## 2022-07-11 RX ADMIN — HYDRALAZINE HYDROCHLORIDE 20 MG: 10 TABLET, FILM COATED ORAL at 05:36

## 2022-07-11 RX ADMIN — Medication 150 MCG/HR: at 05:03

## 2022-07-11 RX ADMIN — PROPOFOL 60 MCG/KG/MIN: 10 INJECTION, EMULSION INTRAVENOUS at 21:06

## 2022-07-11 RX ADMIN — Medication 40 MG: at 07:23

## 2022-07-11 RX ADMIN — FLUTICASONE PROPIONATE AND SALMETEROL XINAFOATE 2 PUFF: 115; 21 AEROSOL, METERED RESPIRATORY (INHALATION) at 20:18

## 2022-07-11 RX ADMIN — PROPOFOL 60 MCG/KG/MIN: 10 INJECTION, EMULSION INTRAVENOUS at 06:50

## 2022-07-11 ASSESSMENT — ACTIVITIES OF DAILY LIVING (ADL)
ADLS_ACUITY_SCORE: 34
ADLS_ACUITY_SCORE: 36
ADLS_ACUITY_SCORE: 34
ADLS_ACUITY_SCORE: 34
ADLS_ACUITY_SCORE: 36
ADLS_ACUITY_SCORE: 34

## 2022-07-11 NOTE — BRIEF OP NOTE
[facility]    Brief Operative Note    Pre-operative diagnosis: Status post cardiac surgery [Z98.890]  Post-operative diagnosis Same as pre-operative diagnosis    Procedure: Procedure(s):  Chest washout and closure  Surgeon: Surgeon(s) and Role:     * Darnell Morgan MD - Primary     * Ann Alonzo MD - Fellow - Assisting     * Miguel Iglesias MD - Fellow - Assisting  Anesthesia: General   Estimated Blood Loss: Less than 100 ml    Drains: Unchanged  Specimens: * No specimens in log *  Findings:   Spliced out the oxygenator on RVAD, chest washed out and closed.  Complications: None.  Implants: * No implants in log *     Signed:    Miguel Iglesias MD 7/11/2022 at 5:53 PM  Cardiothoracic Surgery Fellow  Pager: [phone]

## 2022-07-11 NOTE — PLAN OF CARE
ICU End of Shift Summary. See flowsheets for vital signs and detailed assessment.    Changes this shift: Remains sedated on propofol and fentanyl. OR today for washout and closure, successful. Oxygenator removed from RVAD. NJ placed today by radiology. ABD xray for placement confirmation s/p OR pending. K 20meq replaced x 1. BM x 2. Oliguric, team aware.     Plan:  restart tube feeds once NJ placement verified. Cardiology paged to reset ICD. Nitric wean to start tomorrow pending progress.    Goal Outcome Evaluation:

## 2022-07-11 NOTE — PROGRESS NOTES
ECMO Shift Summary: (1845 to 0715)    ECMO Equipment:  Console serial number: Primary [id number], secondary [id number]  Circuit Lot number: [id number]  Oxygenator Lot number: [id number]  Pump head lot number: [id number]      Patient remains on [facility] ECMO, all equipment is functioning and alarms are appropriately set. RPMs 4050 with flow range 3.38 - 3.41 L/min. Sweep gas has been off for >48 hrs.  Fibrin rings/deposits present and developing at circuit connection; large amount of fibrin present at the inflow corner of the oxygenator. Oxygenator has clot and fibrin deposits on both sides. Cannulas are secure with no bleeding from site. Extremities are cool.     Significant Shift Events:   No major events this shift. UOP dropped off, but improved w/ administration of lasix. No issues with chugging this shift.    Bleeding/Anticoagulation:  Heparin is running at 400 u/hr, ACT range 165 - 173.    Blood loss was minimal, see flow sheets for CT output.    Volume Status:  Urine output was 15 - 30 mL prior to lasix administration, ~1.5 L/ out after diuretic.    Intake/Output Summary (Last 24 hours) at 7/11/2022 0532  Last data filed at 7/11/2022 0500  Gross per 24 hour   Intake 2435.35 ml   Output 2624 ml   Net -188.65 ml       Labs:    Recent Labs   Lab 07/11/22  0353 07/11/22  0339 07/10/22  2338 07/10/22  1955 07/10/22  1548   PH  --  7.44 7.46* 7.46* 7.44   PCO2  --  33* 30* 32* 34*   PO2  --  186* 189* 201* 182*   HCO3  --  23 21 23 23   O2PER 50 50 50 40 50       Lab Results   Component Value Date    HGB 9.5 (L) 07/11/2022    PHGB 100 (H) 07/10/2022    PLT 76 (L) 07/11/2022    FIBR 106 (L) 07/11/2022    INR 3.47 (H) 07/11/2022     (HH) 07/11/2022    DD 0.51 (H) 07/11/2022    ANTCH 67 (L) 07/10/2022       Vent settings:  Vent Mode: CMV/AC  (Continuous Mandatory Ventilation/ Assist Control)  FiO2 (%): 50 %  Resp Rate (Set): 14 breaths/min  Tidal Volume (Set, mL): 450 mL  PEEP (cm H2O): 5 cmH2O  Resp:  14  .        Plan is to travel to OR for washout, ?closure, removal of oxygenator from circuit.      Lisa Alvarez, RRT-ACCS / ECMO Specialist  7/11/2022 5:32 AM

## 2022-07-11 NOTE — ANESTHESIA CARE TRANSFER NOTE
Patient: Dandy Sands    Procedure: Procedure(s):  Chest washout and closure       Diagnosis: Status post cardiac surgery [Z98.890]  Diagnosis Additional Information: No value filed.    Anesthesia Type:   No value filed.     Note:    Oropharynx: endotracheal tube in place  Level of Consciousness: iatrogenic sedation      Independent Airway: airway patency not satisfactory and stable  Dentition: dentition unchanged  Vital Signs Stable: post-procedure vital signs reviewed and stable  Report to RN Given: handoff report given  Patient transferred to: ICU  Comments: Pt transferred to ICU monitored, sedated, intubated. Stable throughout transport. Upon arrival placed on vent with NO, report to bedside cardiac team, no further questions.   ICU Handoff: Call for PAUSE to initiate/utilize ICU HANDOFF, Identified Patient, Identified Responsible Provider, Reviewed the Pertinent Medical History, Discussed Surgical Course, Reviewed Intra-OP Anesthesia Management and Issues during Anesthesia, Set Expectations for Post Procedure Period and Allowed Opportunity for Questions and Acknowledgement of Understanding      Vitals:  Vitals Value Taken Time   BP     Temp     Pulse 90 07/11/22 1835   Resp 12 07/11/22 1835   SpO2     Vitals shown include unvalidated device data.    Electronically Signed By: YAO Torres CRNA  July 11, 2022  6:37 PM

## 2022-07-11 NOTE — PLAN OF CARE
ICU End of Shift Summary. See flowsheets for vital signs and detailed assessment.    Changes this shift: Pt sedated. Able to move extremities x4 after brief sedation holiday. NSR in 80's. No albumin or blood products needed this shift. CT output between 0-57 qh.  Potassium replaced. Diuresed with effect. Tolerating TF at 20. Has been NPO since 0500.     Plan:  To OR for chest washout and closure.

## 2022-07-11 NOTE — PROVIDER NOTIFICATION
Notified MD at 2300 PM regarding lab results/potassium and low urine output 3.7.      Spoke with: Justin Fields MD    Orders were obtained.    Comments: Potassium High Replacement Protocol and administer 40mg IV Lasix one time.

## 2022-07-11 NOTE — PROGRESS NOTES
[facility] ICU PROGRESS NOTE  7/9/2022       CO-MORBIDITIES  1. Cardiogenic shock (H)  (primary encounter diagnosis)  2. Ischemic cardiomyopathy  3. Heart failure with reduced ejection fraction, NYHA class III (H)  4. Coronary artery disease involving native coronary artery of native heart without angina pectoris      ASSESSMENT Dandy Sands is a 61 year old male with a PMH of CAD s/p PCI to LAD, MI, ICM, acute on chronic heart failure, s/p CRT-D, severe MR, antiphospholipid antibody syndrome on chronic warfarin, SLE, HTN, HLD, recent hospitalization 5/16-5/26 s/p RCA, LAD stenting who underwent RVAD (29F Protek duo RIJ) LVAD placement (HeartMate 3) on 7/8/22 by Dr. Zamora.      Changes 7/11:  - Plan for wean of nitric oxide post-operatively   - NJT 1300  - CXR post-operatively   - Follow ACT to drive anticoagulation goal    - Follow-up post-operative heparin dosing recommendation per surgery team on arrival from OR     PLAN:  Neuro/ pain/ sedation:  #Acute Postoperative pain  #Depression  #Anxiety due to a medical condition  #Insomnia  - Monitor neurological status. Notify the MD for any acute changes in exam.  - Pain: fentanyl gtt. Scheduled tylenol. PRN tylenol, oxycodone, dilaudid.  - Sedation: propofol gtt  - Duloxetine 30mg  - Holding PTA meds: hydroxyzine 25mg PRN, zolpidem 5mg, melatonin 3mg, lorazepam 0.5mg    Pulmonary:   #Acute hypoxic respiratory failure on iMV  #Mild-moderate emphysema  #History of COVID-19  - Patient with open chest  - Mechanical ventilation   - On NO 20 PPM, plan to wean post-operatively    Cardiovascular:    #S/p LVAD placement (HeartMate 3) on 7/8/22   #S/p RVAD (29F Protek duo RIJ) on 7/8/22  #Cardiogenic shock  #Advanced ischemic cardiomyopathy, class IV, stage D  #CAD s/p PCI to LAD (2005)  #S/p CRT-D (2006)  #History of MI (2007)  #Severe MR  #Antiphospholipid antibody syndrome on chronic warfarin  #HTN  #HLD  #Recent hospitalization 5/16-5/26 s/p RCA, LAD stenting  #Atrial  fibrillation   - Monitor hemodynamic status  - Goal MAP > 65   - Wean off pressors, inotropes, and nitric oxide as tolerated  - PTA meds: atorvastatin 40mg, clopidogrel 75mg, lasix 40mg, warfarin 5mg  - LVAD management per Advanced HF service  - Continue straight rate heparin 400 cc/hr until on call to OR, as therapeutic ACT  - Clarify post-operatively about anticoagulation plan with surgical team   - Plan for oxygenator splicing per CVTS     GI / Nutrition:   #GERD  #Congestive hepatopathy in the setting of cardiac insuffiency  - NPO; begin trickle feeds via OG today   - PPI  - Bowel regimen  - No indication for parenteral nutrition.  - Nutrition consulted.  - Trend LFT's  - NJT 1 PM, do before OR  - xr post op  - start TF today post OR    Renal/ Fluid Balance:    - Strict I/O, daily weights  - Avoid/limit nephrotoxins as able  - Replete lytes PRN per protocol  - PTA meds: tamsulosin 0.4mg (held)  - Fluid goal net even to mild negative       Endocrine:    #Stress induced hyperglycemia  Preop A1c 5.5%  - Insulin gtt  - Goal BG <180 for optimal healing      ID/ Antibiotics:  #Periopearive antibiosis  - Cefepime, vancomycin, rifampin, fluconazole  - Trend fever curve     Heme:     #Acute blood loss anemia  #Acute blood loss thrombocytopenia  #History of DVT and PE   #Antiphospholipid antibody syndrome  #History of iron deficiency anemia  - Monitor for s/sx of bleeding  - Trend CBC and coags.  - Hgb goal > 7  - Plt goal > 20    Rheumatology:  #SLE  - Chronic, symptoms include arthritis, photosensitivity, fatigue, and skin manifestations  - PTA meds: hydroxychloroquine 200mg, held     Prophylaxis:    - DVT prophylaxis: SCD, SQH  - PPI  - Bowel regimen  - PT/OT      Disposition:  - CV ICU.        ====================================================    Patient seen, findings and plan discussed with CV ICU staff, Dr. Samano.      Rashard Stephenson MD  Anesthesiology,  PGY3    ====================================================        SUBJECTIVE  - NAEON  - Off dobutamine  - Responding appropriately to Lasix x1     OBJECTIVE    1. VITAL SIGNS    Temp:  [97.7  F (36.5  C)-98.6  F (37  C)] 98.4  F (36.9  C)  Pulse:  [] 85  Resp:  [14-29] 14  MAP:  [72 mmHg-118 mmHg] 80 mmHg  Arterial Line BP: ()/(51-86) 85/59  FiO2 (%):  [50 %] 50 %  SpO2:  [90 %-100 %] 100 %  Vent Mode: CMV/AC  (Continuous Mandatory Ventilation/ Assist Control)  FiO2 (%): 50 %  Resp Rate (Set): 14 breaths/min  Tidal Volume (Set, mL): 450 mL  PEEP (cm H2O): 5 cmH2O  Resp: 14        2. INTAKE/ OUTPUT    I/O last 3 completed shifts:  In: 2820.75 [I.V.:1980.75; NG/GT:450]  Out: 1510 [Urine:760; Emesis/NG output:150; Chest Tube:600]      3. PHYSICAL EXAMINATION    General: sedated  Neuro: RAAS -2   Resp: intubated  CV: SR  Abdomen: Soft, Non-distended  Skin/Incisions: open chest, right internal jugular cannulation, left femoral CVC/arterial line.  Extremities: warm and well perfused. LUE ecchymosis, unchanged      4. INVESTIGATIONS    Arterial Blood Gases   Recent Labs   Lab 07/11/22  0339 07/10/22  2338 07/10/22  1955 07/10/22  1548   PH 7.44 7.46* 7.46* 7.44   PCO2 33* 30* 32* 34*   PO2 186* 189* 201* 182*   HCO3 23 21 23 23     Complete Blood Count   Recent Labs   Lab 07/11/22  0448 07/10/22  2146 07/10/22  1547 07/10/22  0958   WBC 9.5 9.1 8.2 9.4   HGB 9.5* 9.4* 9.6* 9.4*   PLT 76* 74* 68* 78*     Basic Metabolic Panel  Recent Labs   Lab 07/11/22  0526 07/11/22  0452 07/11/22  0400 07/11/22  0016 07/10/22  2157 07/10/22  2146 07/10/22  1552 07/10/22  1547 07/10/22  1008 07/10/22  0958   NA  --  135*  --   --   --  131*  --  132*  --  131*   POTASSIUM  --  3.5  --   --   --  3.7  --  3.9  --  3.9   CHLORIDE  --  105  --   --   --  102  --  102  --  102   CO2  --  20*  --   --   --  21*  --  20*  --  21*   BUN  --  14.8  --   --   --  14.4  --  13.8  --  14.5   CR  --  0.59*  --   --   --  0.53*  --   0.53*  --  0.62*   * 123* 146* 110*   < > 105*   < > 98   < > 106*    < > = values in this interval not displayed.     Liver Function Tests  Recent Labs   Lab 07/11/22  0518 07/11/22  0452 07/11/22  0337 07/10/22  2146 07/10/22  1547 07/10/22  0958 07/10/22  0349 07/09/22  0952 07/09/22  0408 07/08/22  2213 07/08/22  1843 07/08/22  1643 07/08/22  1400 07/08/22  0332 07/07/22  0356   AST  --   --   --   --   --   --   --   --   --  159*  --  134*  --  32 32   ALT  --  19  --   --   --   --  21  --  29 29  --  25  --  39 41   ALKPHOS  --   --   --   --   --   --   --   --   --  59  --  51  --  127 125   BILITOTAL  --   --   --   --   --   --   --   --   --  1.0  --  1.0  --  0.3 0.3   ALBUMIN  --   --  0.6*  --   --   --  3.1*  --  2.9* 2.5*  --  2.3*  --  3.5 3.5   INR 1.30*  --  3.47* 1.27* 1.28*   < > 1.24*   < > 1.22* 1.45*   < > 1.87*   < > 1.21* 1.13    < > = values in this interval not displayed.     Pancreatic Enzymes  No lab results found in last 7 days.  Coagulation Profile  Recent Labs   Lab 07/11/22  0518 07/11/22  0337 07/10/22  2146 07/10/22  1547   INR 1.30* 3.47* 1.27* 1.28*   PTT 51* 134* 60* 59*         5. RADIOLOGY    Recent Results (from the past 24 hour(s))   XR Chest Port 1 View    Narrative    XR CHEST PORT 1 VIEW  7/10/2022 10:03 AM      HISTORY: assess PICC line coil after power flush    COMPARISON: 7/10/2022    FINDINGS:   Single AP view of the chest. Postoperative changes of LVAD. Stable  left chest wall ICD leads. Stable right IJ RVAD. Endotracheal tip over  the mid thoracic trachea. Enteric tube courses beyond the  field-of-view. Left basilar chest tube and mediastinal drains in  similar position. Similar surgical sponges over the mediastinum. Right  arm PICC tip over the low SVC with resolved loop. Stable mediastinum.  No pneumothorax or significant right pleural effusion. Small left  pleural effusion. Similar retrocardiac opacities.      Impression    IMPRESSION:   1. Resolved  right arm PICC looping with tip in the low SVC. Stable  remaining devices.  2. Small left pleural effusion with unchanged retrocardiac  atelectasis/consolidation.    I have personally reviewed the examination and initial interpretation  and I agree with the findings.    GABRIEL COURTNEY MD         SYSTEM ID:  [id number]   XR Chest Port 1 View    Narrative    EXAM: XR CHEST PORT 1 VIEW  7/11/2022 12:58 AM     HISTORY:  RVAD/LVAD/ETT       COMPARISON:  7/10/2022     FINDINGS: Single view of the chest. The endotracheal tube tip projects  over the midthoracic trachea. Tip and sidehole of the enteric tube  projects off the field of view but at least to the fundus of the  stomach. Stable LVAD and left chest wall cardiac device with atrial  and ventricular leads. Right upper extremity PICC tip is obscured in  the region of the high SVC. Stable positioning of right IJ RVAD with  its tip in the pulmonary artery. Mediastinal drains and left basilar  chest tube. Chest is open with surgical sponges project over the  mediastinum.     Stable enlarged cardiac silhouette. Small bilateral pleural effusions.  No appreciable pneumothorax. Continued bibasilar opacities.      Impression    IMPRESSION:   1. Stable support devices.  2. No significant change in small pleural effusions with overlying  bibasilar opacities.    I have personally reviewed the examination and initial interpretation  and I agree with the findings.    LAI HERNADEZ MD         SYSTEM ID:  [id number]         6. LINES/DRAINS/AIRWAY    Peripheral IV 07/05/22 Left;Anterior Upper forearm (Active)   Site Assessment WDL 07/11/22 0400   Line Status Saline locked 07/11/22 0400   Dressing Intervention New dressing  07/07/22 0400   Phlebitis Scale 0-->no symptoms 07/11/22 0400   Infiltration Scale 0 07/11/22 0400   If infiltrated, was a vesicant infusing? No 07/11/22 0400   Number of days: 6       PICC Triple Lumen 06/17/22 Right Brachial vein medial (Active)   Site Assessment WDL  07/11/22 0400   Dressing Intervention Chlorhexidine patch;Transparent 07/11/22 0400   Dressing Change Due 07/17/22 07/11/22 0400   PICC Comment from OSH 06/17/22 1700   Osborne - Status saline locked 07/11/22 0400   Osborne - Cap Change Due 07/12/22 07/11/22 0400   Red - Status infusing 07/11/22 0400   Red - Cap Change Due 07/12/22 07/11/22 0400   White - Status infusing 07/11/22 0400   White - Cap Change Due 07/12/22 07/11/22 0400   Extravasation? No 07/11/22 0400   Line Necessity Yes, meets criteria 07/11/22 0400   Number of days: 24       Introducer 07/08/22 Femoral Left (Active)   Specific Qualities Capped 07/11/22 0400   (Retired) Dressing Status Clean, dry, intact 07/11/22 0400   Dressing Intervention Dressing changed 07/10/22 0000   Dressing Change Due 07/17/22 07/11/22 0400   Number of days: 3       Arterial Line 07/08/22 Femoral (Active)   Site Assessment WDL 07/11/22 0400   Line Status Pulsatile blood flow 07/11/22 0400   Arterine Line Cap Change Due 07/12/22 07/11/22 0400   Art Line Waveform Appropriate 07/11/22 0400   Art Line Interventions Leveled;Connections checked and tightened 07/11/22 0400   Color/Movement/Sensation Capillary refill less than 3 sec 07/11/22 0400   Line Necessity Yes, meets criteria 07/11/22 0400   Dressing Type Transparent 07/11/22 0400   Dressing Status Clean, dry, intact 07/11/22 0400   Dressing Intervention Dressing changed/new dressing 07/10/22 0000   Dressing Change Due 07/17/22 07/11/22 0400   Number of days: 3       Arterial Line 07/08/22 Radial (Active)   Site Assessment WDL 07/11/22 0400   Line Status Pulsatile blood flow 07/11/22 0400   Arterine Line Cap Change Due 07/12/22 07/11/22 0400   Art Line Waveform Appropriate 07/11/22 0400   Art Line Interventions Leveled;Connections checked and tightened 07/11/22 0400   Color/Movement/Sensation Capillary refill less than 3 sec 07/11/22 0400   Line Necessity Yes, meets criteria 07/11/22 0400   Dressing Type Transparent 07/11/22 0400    Dressing Status Clean, dry, intact 07/11/22 0400   Dressing Intervention Dressing changed/new dressing 07/10/22 0000   Dressing Change Due 07/17/22 07/11/22 0400   Number of days: 3       Venous Sheath 07/08/22 Other (comment) Right (Active)   Specific Qualities Sutured;Left in Place 07/11/22 0400   Site Assessment UTV 07/11/22 0400   Dressing Type Transparent 07/10/22 0400   Dressing Intervention Dressing changed/new dressing 07/08/22 1700   Venous Sheath Comment Protek duo 07/11/22 0400   Number of days: 3       CVC Double Lumen Left Femoral (Active)   Site Assessment WDL 07/11/22 0400   Dressing Type Chlorhexidine disk;Transparent 07/11/22 0400   Dressing Status clean;dry;intact 07/11/22 0400   Dressing Intervention dressing changed 07/10/22 0000   Dressing Change Due 07/17/22 07/11/22 0400   Line Necessity yes, meets criteria 07/11/22 0400   Brown - Status infusing 07/11/22 0400   Brown - Cap Change Due 07/12/22 07/11/22 0400   White - Status infusing 07/11/22 0400   White - Cap Change Due 07/12/22 07/11/22 0400   Phlebitis Scale 0-->no symptoms 07/11/22 0400   Infiltration? no 07/11/22 0400   Infiltration Scale 0 07/11/22 0400   CVC Comment MAC 07/11/22 0400   Number of days: 3       ETT Cuffed Single 8 mm (Active)   Secured at (cm) 22 cm 07/11/22 0515   Measured from Teeth/Gums 07/11/22 0515   Position Right 07/11/22 0515   Secured by Commercial tube partida 07/11/22 0515   Bite Block None Present 07/11/22 0515   Site Appearance Clean;Dry 07/11/22 0515   Tube Care Site care done 07/11/22 0400   Cuff Assessment Minimal occluding volume 07/11/22 0515   Safety Measures Manual resuscitator/mask/valve in room 07/11/22 0515   Number of days: 3       Chest Tube 1 Anterior Mediastinal 9 Syriac (Active)   Site Assessment UTV 07/11/22 0400   Suction -20 cm H2O 07/11/22 0400   Chest Tube Airleak No 07/11/22 0400   Drainage Description Serosanguinous 07/11/22 0400   Dressing Status Normal: Clean, Dry & Intact 07/11/22  0400   Dressing Change Due 07/12/22 07/11/22 0400   Dressing Intervention Gauze 07/11/22 0400   Patency Intervention Tip/Tilt 07/11/22 0400   Chest Tube Clamps at Bedside present 07/11/22 0400   Container Amount 180 07/11/22 0600   Output (ml) 30 ml 07/11/22 0600   Number of days: 3       Chest Tube 2 Anterior Mediastinal 9 [place] (Active)   Site Assessment UTV 07/11/22 0400   Suction -20 cm H2O 07/11/22 0400   Chest Tube Airleak No 07/11/22 0400   Drainage Description Serosanguinous 07/11/22 0400   Dressing Status Normal: Clean, Dry & Intact 07/11/22 0400   Dressing Change Due 07/12/22 07/11/22 0400   Dressing Intervention Gauze 07/11/22 0400   Patency Intervention Tip/Tilt 07/11/22 0000   Chest Tube Clamps at Bedside present 07/11/22 0000   Number of days: 3       Chest Tube 3 Pleural 32 [place] (Active)   Site Assessment UTV 07/11/22 0400   Suction -20 cm H2O 07/11/22 0400   Chest Tube Airleak No 07/11/22 0400   Drainage Description Serosanguinous 07/11/22 0400   Dressing Status Normal: Clean, Dry & Intact 07/11/22 0400   Dressing Change Due 07/12/22 07/11/22 0400   Dressing Intervention Gauze 07/11/22 0400   Patency Intervention Tip/Tilt 07/11/22 0400   Chest Tube Clamps at Bedside present 07/11/22 0400   Container Amount 70 07/11/22 0600   Output (ml) 6 ml 07/11/22 0600   Number of days: 3       Chest Tube 4 Posterior Pericardial 24 [place] (Active)   Site Assessment WDL 07/11/22 0400   Suction -20 cm H2O 07/11/22 0400   Chest Tube Airleak No 07/11/22 0400   Drainage Description Serosanguinous 07/11/22 0400   Dressing Status Normal: Clean, Dry & Intact 07/11/22 0400   Dressing Change Due 07/12/22 07/11/22 0400   Dressing Intervention Gauze 07/11/22 0400   Patency Intervention Tip/Tilt 07/11/22 0400   Chest Tube Clamps at Bedside present 07/11/22 0400   Number of days: 3       Chest Tube 5 Anterior Mediastinal 28 [place] (Active)   Site Assessment UTV 07/11/22 0400   Suction -20 cm H2O 07/11/22 0400   Chest Tube  Airleak No 07/11/22 0400   Drainage Description Serosanguinous 07/11/22 0400   Dressing Status Normal: Clean, Dry & Intact 07/11/22 0400   Dressing Change Due 07/12/22 07/11/22 0400   Dressing Intervention Gauze 07/11/22 0400   Patency Intervention Tip/Tilt 07/11/22 0400   Chest Tube Clamps at Bedside present 07/11/22 0400   Number of days: 3       NG/OG/NJ Tube Orogastric (Active)   Site Description WDL 07/11/22 0400   Status Enteral Feedings 07/11/22 0400   Drainage Appearance Bile 07/10/22 2000   Placement Confirmation X-ray 07/11/22 0400   Warrior (cm marking) at nare/mouth 79 cm 07/11/22 0400   Intake (ml) 20 ml 07/11/22 0600   Flush/Free Water (mL) 20 mL 07/11/22 0600   Container Amount 250 mL 07/10/22 1800   Output (ml) 0 ml 07/10/22 1800   Number of days: 3       Urethral Catheter 07/08/22 [place]de;Latex;Temperature probe;Triple-lumen 16 fr (Active)   Tube Description Positional 07/11/22 0400   Catheter Care Done;Catheter wipes 07/11/22 0000   Collection Container Standard;Patent 07/11/22 0400   Securement Method Securing device (Describe) 07/11/22 0400   Rationale for Continued Use Strict 1-2 Hour I&O;Deep Sedation/Paralysis 07/11/22 0400   Urine Output 80 mL 07/11/22 0600   Number of days: 3       Incision/Surgical Site 06/23/22 Right Chest (Active)   Incision Assessment UTV 07/11/22 0400   Deirdre-Incision Assessment UTV 07/10/22 2000   Closure FABRICE 07/11/22 0400   Incision Drainage Amount UTV 07/11/22 0400   Drainage Description UTV 07/11/22 0400   Incision Care Wound cleanser 07/11/22 0400   Dressing Intervention Clean, dry, intact 07/11/22 0400   Number of days: 18       Incision/Surgical Site 07/08/22 Chest (Active)   Incision Assessment UTV 07/11/22 0400   Deirdre-Incision Assessment UTV 07/10/22 2000   Closure Other (Comment) 07/11/22 0400   Incision Drainage Amount Scant 07/11/22 0400   Drainage Description Sanguinous 07/11/22 0400   Incision Care Other (Comment) 07/10/22 0400   Dressing Intervention Clean,  dry, intact 07/11/22 0400   Number of days: 3          ====================================================

## 2022-07-11 NOTE — PROGRESS NOTES
[facility]    Cardiology Progress Note- Cards 2        Date of Admission:  6/17/2022     Assessment & Plan: HVSL   Dandy EDUARD Sands is a 61 year old male with PMH of lupus, antiphospholipid syndrome, HTN, HLD, HFrEF 2/2 ICM who presented with cardiogenic shock c/b multiorgan failure (JOSE, shock liver) requiring mechanical support with IABP, now s/p HM3 LVAD 7/8/22 by Dr. Zamora with intraoperative course c/b RV failure s/p 29F Protek duo via RIJ    #HFrEF 2/2 ICM s/p HM3 LVAD in setting of cardiogenic shock (SCAI D)  #RV failure s/p Protek duo temporary RVAD  Patient with ICM s/p HM3 LVAD 7/8/22 by Dr. Zamora in setting of presentation for cardiogenic shock requiring MCS with IABP. Intraoperative course c/b RV failure resulting in cardiogenic shock requiring high dose pressors necessitating RVAD support. Pressors able to be weaned and overall end organ function appears intact at present time. Coagulopathy now corrected with transfusion support. Overall current plan of care to continue to target euvolemia with plan for washout and closure in OR today. Then will plan to proceed towards weaning his oxygen requirements and working towards extubation with wean of nitric prior. He currently appears to have rather minimal systemic volume which we will use diuretics as needed to maintain this. RVAD/LVAD appear to have balanced flows given appearance chest XR and no alarms for either circuit. Will continue to monitor his hemodynamic status closely to ensure RVAD/LVAD flows, particularly after chest closure to ensure no hemodynamic compromise  -continue transfusion support as needed to correct coagulopathy; tentative plan per surgical team for washout and closure this afternoon  -continue LVAD at current speed  -continue broad spectrum abx for open chest  -continue hydralazine 20mg Q8H for elevated MAPs to assist with flows (hold parameters for MAP<80 placed)  -AC per  primary team      The patient's care was discussed with the Attending Physician, Dr. Askew, Bedside Nurse and Patient.    Emelyn Meneses MD  [facility]    ______________________________________________________________________    Interval History   Nursing notes reviewed. BRAXTON. With wean of sedation, moves all 4. No new VAD alarms.     Data reviewed today: I reviewed all medications, new labs and imaging results over the last 24 hours. I personally reviewed the chest x-ray image(s) showing stable lung appearance     Physical Exam   Vital Signs: Temp: 98.4  F (36.9  C) Temp src: Bladder  Pulse: 81   Resp: 14 SpO2: 100 % O2 Device: Mechanical Ventilator    Weight: 171 lbs 8.29 oz  General Appearance: Intubated, sedated male in ICU bed  Respiratory: ventilated lung sounds, clear anterior breath sounds  Cardiovascular: vad hum, driveline site c/d/i  GI: soft, bs active  Skin: warm, well perfused, minimal peripheral edema  Neuro: sedated, intubated, pupils equal, moves all 4 with sedation wean    Data   Recent Labs   Lab 07/11/22  0526 07/11/22  0518 07/11/22  0452 07/11/22  0448 07/11/22  0400 07/11/22  0337 07/10/22  2157 07/10/22  2146 07/10/22  1552 07/10/22  1547 07/10/22  0352 07/10/22  0349 07/08/22  2225 07/08/22  2213 07/08/22  1644 07/08/22  1643   WBC  --   --   --  9.5  --   --   --  9.1  --  8.2   < >  --    < > 7.8   < > 14.1*   HGB  --   --   --  9.5*  --   --   --  9.4*  --  9.6*   < >  --    < > 8.7*   < > 7.2*   MCV  --   --   --  86  --   --   --  86  --  85   < >  --    < > 89   < > 89   PLT  --   --   --  76*  --   --   --  74*  --  68*   < >  --    < > 114*   < > 163   INR  --  1.30*  --   --   --   --   --  1.27*  --  1.28*   < > 1.24*   < > 1.45*   < > 1.87*   NA  --   --  135*  --   --   --   --  131*  --  132*   < > 133*   < > 137  --  141   POTASSIUM  --   --  3.5  --   --   --   --  3.7  --  3.9   < > 4.1   < > 4.4  --  3.9   CHLORIDE  --    --  105  --   --   --   --  102  --  102   < > 103   < > 106  --  107   CO2  --   --  20*  --   --   --   --  21*  --  20*   < > 20*   < > 22  --  20*   BUN  --   --  14.8  --   --   --   --  14.4  --  13.8   < > 15.0   < > 16.7  --  14.0   CR  --   --  0.59*  --   --   --   --  0.53*  --  0.53*   < > 0.59*   < > 0.78  --  0.73   ANIONGAP  --   --  10  --   --   --   --  8  --  10   < > 10   < > 9  --  14   ELDA  --   --  8.0*  --   --   --   --  8.3*  --  8.2*   < > 8.3*   < > 8.3*  --  8.2*   *  --  123*  --  146*  --    < > 105*   < > 98   < > 105*   < > 101*   < > 108*   ALBUMIN  --   --   --   --   --  0.6*  --   --   --   --   --  3.1*   < > 2.5*  --  2.3*   PROTTOTAL  --   --   --   --   --   --   --   --   --   --   --   --   --  4.4*  --  3.8*   BILITOTAL  --   --   --   --   --   --   --   --   --   --   --   --   --  1.0  --  1.0   ALKPHOS  --   --   --   --   --   --   --   --   --   --   --   --   --  59  --  51   ALT  --   --  19  --   --   --   --   --   --   --   --  21   < > 29  --  25   AST  --   --   --   --   --   --   --   --   --   --   --   --   --  159*  --  134*    < > = values in this interval not displayed.     Recent Results (from the past 24 hour(s))   XR Chest Port 1 View    Narrative    XR CHEST PORT 1 VIEW  7/10/2022 10:03 AM      HISTORY: assess PICC line coil after power flush    COMPARISON: 7/10/2022    FINDINGS:   Single AP view of the chest. Postoperative changes of LVAD. Stable  left chest wall ICD leads. Stable right IJ RVAD. Endotracheal tip over  the mid thoracic trachea. Enteric tube courses beyond the  field-of-view. Left basilar chest tube and mediastinal drains in  similar position. Similar surgical sponges over the mediastinum. Right  arm PICC tip over the low SVC with resolved loop. Stable mediastinum.  No pneumothorax or significant right pleural effusion. Small left  pleural effusion. Similar retrocardiac opacities.      Impression    IMPRESSION:   1. Resolved  right arm PICC looping with tip in the low SVC. Stable  remaining devices.  2. Small left pleural effusion with unchanged retrocardiac  atelectasis/consolidation.    I have personally reviewed the examination and initial interpretation  and I agree with the findings.    GABRIEL COURTNEY MD         SYSTEM ID:  [id number]   XR Chest Port 1 View    Narrative    EXAM: XR CHEST PORT 1 VIEW  7/11/2022 12:58 AM     HISTORY:  RVAD/LVAD/ETT       COMPARISON:  7/10/2022     FINDINGS: Single view of the chest. The endotracheal tube tip projects  over the midthoracic trachea. Tip and sidehole of the enteric tube  projects off the field of view but at least to the fundus of the  stomach. Stable LVAD and left chest wall cardiac device with atrial  and ventricular leads. Right upper extremity PICC tip is obscured in  the region of the high SVC. Stable positioning of right IJ RVAD with  its tip in the pulmonary artery. Mediastinal drains and left basilar  chest tube. Chest is open with surgical sponges project over the  mediastinum.     Stable enlarged cardiac silhouette. Small bilateral pleural effusions.  No appreciable pneumothorax. Continued bibasilar opacities.      Impression    IMPRESSION:   1. Stable support devices.  2. No significant change in small pleural effusions with overlying  bibasilar opacities.    I have personally reviewed the examination and initial interpretation  and I agree with the findings.    LAI HERNADEZ MD         SYSTEM ID:  [id number]     Medications     BETA BLOCKER NOT PRESCRIBED       dextrose       fentaNYL 150 mcg/hr (07/11/22 0700)     heparin 400 Units/hr (07/11/22 0400)     insulin regular Stopped (07/10/22 1200)     Patient RECEIVING antibiotic to treat a different condition and it provides ADEQUATE COVERAGE for this surgical procedure.       propofol (DIPRIVAN) infusion 60 mcg/kg/min (07/11/22 0700)       acetaminophen  975 mg Oral or Feeding Tube Q8H     ceFEPIme (MAXIPIME) IV  2 g Intravenous  Q8H     DULoxetine  30 mg Oral Daily     fluticasone-salmeterol  2 puff Inhalation BID     hydrALAZINE  20 mg Oral Q8H CAMMY     multivitamins w/minerals  15 mL Per Feeding Tube Daily     mupirocin  1 g Both Nostrils BID     pantoprazole  40 mg Oral or NG Tube Daily    Or     pantoprazole  40 mg Oral Daily     polyethylene glycol  17 g Oral or Feeding Tube Daily     senna-docusate  1 tablet Oral or Feeding Tube BID     sodium chloride (PF)  3 mL Intracatheter Q8H     sodium chloride (PF)  3 mL Intracatheter Q8H     vancomycin  1,250 mg Intravenous Q12H

## 2022-07-11 NOTE — ANESTHESIA POSTPROCEDURE EVALUATION
Patient: Dandy Sands    Procedure: Procedure(s):  Chest washout and closure       Anesthesia Type:  No value filed.    Note:  Disposition: ICU            ICU Sign Out: Anesthesiologist/ICU physician sign out WAS performed   Postop Pain Control:    PONV:    Neuro/Psych:    Airway/Respiratory:             Sign Out: AIRWAY IN SITU/Resp. Support                 Reason: Planned Pre-op   CV/Hemodynamics:    Other NRE:    DID A NON-ROUTINE EVENT OCCUR?     Event details/Postop Comments:  Patient transported to ICU in critical condition on LVAD and protek duo,inhaled NO, ambu ventilation, monitors. Patient remained stable during transport. Care of patient handed to ICU staff after detailed report           Last vitals:  Vitals:    07/11/22 1530 07/11/22 1830 07/11/22 1845   BP:      Pulse: 85 91 89   Resp:  12 12   Temp:      SpO2: 96%  100%       Electronically Signed By: Abdi Davidson MD  July 11, 2022  6:49 PM

## 2022-07-11 NOTE — PROGRESS NOTES
ECLS Discontinuation Note:    ECLS was discontinued, the oxygenator was removed in the OR at 1650 7/11/2022 and converted to a RVAD. RVAD flow is 3.26 at 4050 LPM, LVAD flow 3.3 LPM. Heparin to remain off to the pt until the morning.    Woo Singletary, RT  ECMO Specialist  7/11/2022 6:36 PM

## 2022-07-12 ENCOUNTER — ANESTHESIA (OUTPATIENT)
Dept: SURGERY | Facility: CLINIC | Age: 62
DRG: 001 | End: 2022-07-12
Payer: COMMERCIAL

## 2022-07-12 ENCOUNTER — ANESTHESIA EVENT (OUTPATIENT)
Dept: SURGERY | Facility: CLINIC | Age: 62
DRG: 001 | End: 2022-07-12
Payer: COMMERCIAL

## 2022-07-12 ENCOUNTER — APPOINTMENT (OUTPATIENT)
Dept: GENERAL RADIOLOGY | Facility: CLINIC | Age: 62
DRG: 001 | End: 2022-07-12
Attending: SURGERY
Payer: COMMERCIAL

## 2022-07-12 ENCOUNTER — APPOINTMENT (OUTPATIENT)
Dept: CT IMAGING | Facility: CLINIC | Age: 62
DRG: 001 | End: 2022-07-12
Attending: STUDENT IN AN ORGANIZED HEALTH CARE EDUCATION/TRAINING PROGRAM
Payer: COMMERCIAL

## 2022-07-12 ENCOUNTER — ANCILLARY PROCEDURE (OUTPATIENT)
Dept: CARDIOLOGY | Facility: CLINIC | Age: 62
DRG: 001 | End: 2022-07-12
Attending: INTERNAL MEDICINE
Payer: COMMERCIAL

## 2022-07-12 ENCOUNTER — APPOINTMENT (OUTPATIENT)
Dept: GENERAL RADIOLOGY | Facility: CLINIC | Age: 62
DRG: 001 | End: 2022-07-12
Attending: ANESTHESIOLOGY
Payer: COMMERCIAL

## 2022-07-12 DIAGNOSIS — I25.5 ISCHEMIC CARDIOMYOPATHY: Primary | ICD-10-CM

## 2022-07-12 DIAGNOSIS — I25.5 ISCHEMIC CARDIOMYOPATHY: ICD-10-CM

## 2022-07-12 LAB
ACT BLD: 165 SECONDS (ref 74–150)
ACT BLD: 177 SECONDS (ref 74–150)
ACT BLD: 182 SECONDS (ref 74–150)
ALBUMIN SERPL BCG-MCNC: 2.3 G/DL (ref 3.5–5.2)
ALBUMIN UR-MCNC: 70 MG/DL
ALT SERPL W P-5'-P-CCNC: 21 U/L (ref 10–50)
ANION GAP SERPL CALCULATED.3IONS-SCNC: 12 MMOL/L (ref 7–15)
ANION GAP SERPL CALCULATED.3IONS-SCNC: 13 MMOL/L (ref 7–15)
ANION GAP SERPL CALCULATED.3IONS-SCNC: 8 MMOL/L (ref 7–15)
ANION GAP SERPL CALCULATED.3IONS-SCNC: 8 MMOL/L (ref 7–15)
APPEARANCE UR: ABNORMAL
APTT PPP: 43 SECONDS (ref 22–38)
APTT PPP: 51 SECONDS (ref 22–38)
APTT PPP: 52 SECONDS (ref 22–38)
APTT PPP: 52 SECONDS (ref 22–38)
APTT PPP: 55 SECONDS (ref 22–38)
AT III ACT/NOR PPP CHRO: 74 % (ref 85–135)
BASE EXCESS BLDA CALC-SCNC: -3.6 MMOL/L (ref -9–1.8)
BASE EXCESS BLDA CALC-SCNC: -3.8 MMOL/L (ref -9–1.8)
BASE EXCESS BLDA CALC-SCNC: -4.4 MMOL/L (ref -9–1.8)
BASE EXCESS BLDA CALC-SCNC: -6.7 MMOL/L (ref -9–1.8)
BASE EXCESS BLDA CALC-SCNC: -7.5 MMOL/L (ref -9–1.8)
BASE EXCESS BLDA CALC-SCNC: -9.2 MMOL/L (ref -9–1.8)
BASOPHILS # BLD AUTO: 0.1 10E3/UL (ref 0–0.2)
BASOPHILS # BLD MANUAL: 0 10E3/UL (ref 0–0.2)
BASOPHILS # BLD MANUAL: 0 10E3/UL (ref 0–0.2)
BASOPHILS NFR BLD AUTO: 1 %
BASOPHILS NFR BLD MANUAL: 0 %
BASOPHILS NFR BLD MANUAL: 0 %
BILIRUB UR QL STRIP: NEGATIVE
BLD PROD TYP BPU: NORMAL
BLOOD COMPONENT TYPE: NORMAL
BUN SERPL-MCNC: 18.2 MG/DL (ref 8–23)
BUN SERPL-MCNC: 19.8 MG/DL (ref 8–23)
BUN SERPL-MCNC: 24.3 MG/DL (ref 8–23)
BUN SERPL-MCNC: 27.6 MG/DL (ref 8–23)
BURR CELLS BLD QL SMEAR: SLIGHT
CA-I BLD-MCNC: 4.5 MG/DL (ref 4.4–5.2)
CA-I BLD-MCNC: 4.6 MG/DL (ref 4.4–5.2)
CALCIUM SERPL-MCNC: 7.8 MG/DL (ref 8.8–10.2)
CALCIUM SERPL-MCNC: 7.8 MG/DL (ref 8.8–10.2)
CALCIUM SERPL-MCNC: 7.9 MG/DL (ref 8.8–10.2)
CALCIUM SERPL-MCNC: 7.9 MG/DL (ref 8.8–10.2)
CHLORIDE SERPL-SCNC: 101 MMOL/L (ref 98–107)
CHLORIDE SERPL-SCNC: 104 MMOL/L (ref 98–107)
CHLORIDE SERPL-SCNC: 105 MMOL/L (ref 98–107)
CHLORIDE SERPL-SCNC: 106 MMOL/L (ref 98–107)
CODING SYSTEM: NORMAL
COLOR UR AUTO: YELLOW
CREAT SERPL-MCNC: 0.66 MG/DL (ref 0.67–1.17)
CREAT SERPL-MCNC: 0.68 MG/DL (ref 0.67–1.17)
CREAT SERPL-MCNC: 0.86 MG/DL (ref 0.67–1.17)
CREAT SERPL-MCNC: 1.09 MG/DL (ref 0.67–1.17)
CROSSMATCH: NORMAL
D DIMER PPP FEU-MCNC: 1.79 UG/ML FEU (ref 0–0.5)
D DIMER PPP FEU-MCNC: 2.21 UG/ML FEU (ref 0–0.5)
D DIMER PPP FEU-MCNC: 3.53 UG/ML FEU (ref 0–0.5)
DEPRECATED HCO3 PLAS-SCNC: 15 MMOL/L (ref 22–29)
DEPRECATED HCO3 PLAS-SCNC: 17 MMOL/L (ref 22–29)
DEPRECATED HCO3 PLAS-SCNC: 20 MMOL/L (ref 22–29)
DEPRECATED HCO3 PLAS-SCNC: 20 MMOL/L (ref 22–29)
EOSINOPHIL # BLD AUTO: 0.2 10E3/UL (ref 0–0.7)
EOSINOPHIL # BLD AUTO: 0.2 10E3/UL (ref 0–0.7)
EOSINOPHIL # BLD AUTO: 0.3 10E3/UL (ref 0–0.7)
EOSINOPHIL # BLD MANUAL: 0 10E3/UL (ref 0–0.7)
EOSINOPHIL # BLD MANUAL: 0 10E3/UL (ref 0–0.7)
EOSINOPHIL NFR BLD AUTO: 1 %
EOSINOPHIL NFR BLD AUTO: 2 %
EOSINOPHIL NFR BLD AUTO: 2 %
EOSINOPHIL NFR BLD MANUAL: 0 %
EOSINOPHIL NFR BLD MANUAL: 0 %
ERYTHROCYTE [DISTWIDTH] IN BLOOD BY AUTOMATED COUNT: 17 % (ref 10–15)
ERYTHROCYTE [DISTWIDTH] IN BLOOD BY AUTOMATED COUNT: 18.2 % (ref 10–15)
ERYTHROCYTE [DISTWIDTH] IN BLOOD BY AUTOMATED COUNT: 18.8 % (ref 10–15)
ERYTHROCYTE [DISTWIDTH] IN BLOOD BY AUTOMATED COUNT: 19 % (ref 10–15)
ERYTHROCYTE [DISTWIDTH] IN BLOOD BY AUTOMATED COUNT: 19 % (ref 10–15)
FIBRINOGEN PPP-MCNC: 330 MG/DL (ref 170–490)
FIBRINOGEN PPP-MCNC: 359 MG/DL (ref 170–490)
FIBRINOGEN PPP-MCNC: 370 MG/DL (ref 170–490)
GFR SERPL CREATININE-BSD FRML MDRD: 78 ML/MIN/1.73M2
GFR SERPL CREATININE-BSD FRML MDRD: >90 ML/MIN/1.73M2
GLUCOSE BLDC GLUCOMTR-MCNC: 103 MG/DL (ref 70–99)
GLUCOSE BLDC GLUCOMTR-MCNC: 122 MG/DL (ref 70–99)
GLUCOSE BLDC GLUCOMTR-MCNC: 164 MG/DL (ref 70–99)
GLUCOSE BLDC GLUCOMTR-MCNC: 164 MG/DL (ref 70–99)
GLUCOSE BLDC GLUCOMTR-MCNC: 185 MG/DL (ref 70–99)
GLUCOSE BLDC GLUCOMTR-MCNC: 192 MG/DL (ref 70–99)
GLUCOSE BLDC GLUCOMTR-MCNC: 225 MG/DL (ref 70–99)
GLUCOSE SERPL-MCNC: 102 MG/DL (ref 70–99)
GLUCOSE SERPL-MCNC: 153 MG/DL (ref 70–99)
GLUCOSE SERPL-MCNC: 192 MG/DL (ref 70–99)
GLUCOSE SERPL-MCNC: 222 MG/DL (ref 70–99)
GLUCOSE UR STRIP-MCNC: NEGATIVE MG/DL
HCO3 BLD-SCNC: 16 MMOL/L (ref 21–28)
HCO3 BLD-SCNC: 18 MMOL/L (ref 21–28)
HCO3 BLD-SCNC: 18 MMOL/L (ref 21–28)
HCO3 BLD-SCNC: 21 MMOL/L (ref 21–28)
HCO3 BLD-SCNC: 21 MMOL/L (ref 21–28)
HCO3 BLD-SCNC: 22 MMOL/L (ref 21–28)
HCT VFR BLD AUTO: 23.9 % (ref 40–53)
HCT VFR BLD AUTO: 24.9 % (ref 40–53)
HCT VFR BLD AUTO: 25.1 % (ref 40–53)
HCT VFR BLD AUTO: 25.7 % (ref 40–53)
HCT VFR BLD AUTO: 28.7 % (ref 40–53)
HGB BLD-MCNC: 7.6 G/DL (ref 13.3–17.7)
HGB BLD-MCNC: 7.7 G/DL (ref 13.3–17.7)
HGB BLD-MCNC: 8.1 G/DL (ref 13.3–17.7)
HGB BLD-MCNC: 8.1 G/DL (ref 13.3–17.7)
HGB BLD-MCNC: 8.2 G/DL (ref 13.3–17.7)
HGB BLD-MCNC: 9.3 G/DL (ref 13.3–17.7)
HGB FREE PLAS-MCNC: 80 MG/DL
HGB UR QL STRIP: NEGATIVE
HYALINE CASTS: 3 /LPF
IMM GRANULOCYTES # BLD: 0.3 10E3/UL
IMM GRANULOCYTES # BLD: 0.3 10E3/UL
IMM GRANULOCYTES # BLD: 0.6 10E3/UL
IMM GRANULOCYTES NFR BLD: 2 %
IMM GRANULOCYTES NFR BLD: 2 %
IMM GRANULOCYTES NFR BLD: 3 %
INR PPP: 1.23 (ref 0.85–1.15)
INR PPP: 1.26 (ref 0.85–1.15)
INR PPP: 1.27 (ref 0.85–1.15)
INR PPP: 1.28 (ref 0.85–1.15)
INR PPP: 1.35 (ref 0.85–1.15)
ISSUE DATE AND TIME: NORMAL
KETONES UR STRIP-MCNC: NEGATIVE MG/DL
LACTATE SERPL-SCNC: 0.8 MMOL/L (ref 0.7–2)
LACTATE SERPL-SCNC: 0.9 MMOL/L (ref 0.7–2)
LACTATE SERPL-SCNC: 2.3 MMOL/L (ref 0.7–2)
LACTATE SERPL-SCNC: 4.1 MMOL/L (ref 0.7–2)
LACTATE SERPL-SCNC: 4.2 MMOL/L (ref 0.7–2)
LEUKOCYTE ESTERASE UR QL STRIP: NEGATIVE
LYMPHOCYTES # BLD AUTO: 0.7 10E3/UL (ref 0.8–5.3)
LYMPHOCYTES # BLD AUTO: 0.8 10E3/UL (ref 0.8–5.3)
LYMPHOCYTES # BLD AUTO: 1.3 10E3/UL (ref 0.8–5.3)
LYMPHOCYTES # BLD MANUAL: 1.3 10E3/UL (ref 0.8–5.3)
LYMPHOCYTES # BLD MANUAL: 1.4 10E3/UL (ref 0.8–5.3)
LYMPHOCYTES NFR BLD AUTO: 6 %
LYMPHOCYTES NFR BLD AUTO: 7 %
LYMPHOCYTES NFR BLD AUTO: 7 %
LYMPHOCYTES NFR BLD MANUAL: 6 %
LYMPHOCYTES NFR BLD MANUAL: 9 %
MAGNESIUM SERPL-MCNC: 1.9 MG/DL (ref 1.7–2.3)
MCH RBC QN AUTO: 28.5 PG (ref 26.5–33)
MCH RBC QN AUTO: 28.7 PG (ref 26.5–33)
MCH RBC QN AUTO: 28.8 PG (ref 26.5–33)
MCH RBC QN AUTO: 28.9 PG (ref 26.5–33)
MCH RBC QN AUTO: 29 PG (ref 26.5–33)
MCHC RBC AUTO-ENTMCNC: 31.9 G/DL (ref 31.5–36.5)
MCHC RBC AUTO-ENTMCNC: 32.2 G/DL (ref 31.5–36.5)
MCHC RBC AUTO-ENTMCNC: 32.3 G/DL (ref 31.5–36.5)
MCHC RBC AUTO-ENTMCNC: 32.4 G/DL (ref 31.5–36.5)
MCHC RBC AUTO-ENTMCNC: 32.5 G/DL (ref 31.5–36.5)
MCV RBC AUTO: 89 FL (ref 78–100)
MCV RBC AUTO: 90 FL (ref 78–100)
MDC_IDC_EPISODE_DTM: NORMAL
MDC_IDC_EPISODE_DURATION: 102 S
MDC_IDC_EPISODE_DURATION: 102 S
MDC_IDC_EPISODE_DURATION: 144 S
MDC_IDC_EPISODE_DURATION: 152 S
MDC_IDC_EPISODE_DURATION: 180 S
MDC_IDC_EPISODE_DURATION: 182 S
MDC_IDC_EPISODE_DURATION: 186 S
MDC_IDC_EPISODE_DURATION: 203 S
MDC_IDC_EPISODE_DURATION: 214 S
MDC_IDC_EPISODE_DURATION: 228 S
MDC_IDC_EPISODE_DURATION: 235 S
MDC_IDC_EPISODE_DURATION: 278 S
MDC_IDC_EPISODE_DURATION: 296 S
MDC_IDC_EPISODE_DURATION: 297 S
MDC_IDC_EPISODE_DURATION: 309 S
MDC_IDC_EPISODE_DURATION: 316 S
MDC_IDC_EPISODE_DURATION: 322 S
MDC_IDC_EPISODE_DURATION: 328 S
MDC_IDC_EPISODE_DURATION: 350 S
MDC_IDC_EPISODE_DURATION: 351 S
MDC_IDC_EPISODE_DURATION: 353 S
MDC_IDC_EPISODE_DURATION: 361 S
MDC_IDC_EPISODE_DURATION: 378 S
MDC_IDC_EPISODE_DURATION: 597 S
MDC_IDC_EPISODE_DURATION: 61 S
MDC_IDC_EPISODE_DURATION: 93 S
MDC_IDC_EPISODE_DURATION: 970 S
MDC_IDC_EPISODE_DURATION: 99 S
MDC_IDC_EPISODE_ID: 29
MDC_IDC_EPISODE_ID: 30
MDC_IDC_EPISODE_ID: 31
MDC_IDC_EPISODE_ID: 32
MDC_IDC_EPISODE_ID: 33
MDC_IDC_EPISODE_ID: 34
MDC_IDC_EPISODE_ID: 35
MDC_IDC_EPISODE_ID: 36
MDC_IDC_EPISODE_ID: 37
MDC_IDC_EPISODE_ID: 38
MDC_IDC_EPISODE_ID: 39
MDC_IDC_EPISODE_ID: 40
MDC_IDC_EPISODE_ID: 41
MDC_IDC_EPISODE_ID: 42
MDC_IDC_EPISODE_ID: 43
MDC_IDC_EPISODE_ID: 44
MDC_IDC_EPISODE_ID: 45
MDC_IDC_EPISODE_ID: 46
MDC_IDC_EPISODE_ID: 47
MDC_IDC_EPISODE_ID: 48
MDC_IDC_EPISODE_ID: 49
MDC_IDC_EPISODE_ID: 50
MDC_IDC_EPISODE_ID: 51
MDC_IDC_EPISODE_ID: 52
MDC_IDC_EPISODE_ID: 53
MDC_IDC_EPISODE_ID: 54
MDC_IDC_EPISODE_ID: 55
MDC_IDC_EPISODE_ID: 56
MDC_IDC_EPISODE_TYPE: NORMAL
MDC_IDC_LEAD_IMPLANT_DT: NORMAL
MDC_IDC_LEAD_IMPLANT_DT: NORMAL
MDC_IDC_LEAD_LOCATION: NORMAL
MDC_IDC_LEAD_LOCATION: NORMAL
MDC_IDC_LEAD_LOCATION_DETAIL_1: NORMAL
MDC_IDC_LEAD_LOCATION_DETAIL_1: NORMAL
MDC_IDC_LEAD_MFG: NORMAL
MDC_IDC_LEAD_MFG: NORMAL
MDC_IDC_LEAD_MODEL: NORMAL
MDC_IDC_LEAD_MODEL: NORMAL
MDC_IDC_LEAD_POLARITY_TYPE: NORMAL
MDC_IDC_LEAD_POLARITY_TYPE: NORMAL
MDC_IDC_LEAD_SERIAL: NORMAL
MDC_IDC_LEAD_SERIAL: NORMAL
MDC_IDC_MSMT_BATTERY_DTM: NORMAL
MDC_IDC_MSMT_BATTERY_REMAINING_LONGEVITY: 63 MO
MDC_IDC_MSMT_BATTERY_RRT_TRIGGER: 2.73
MDC_IDC_MSMT_BATTERY_STATUS: NORMAL
MDC_IDC_MSMT_BATTERY_VOLTAGE: 2.96 V
MDC_IDC_MSMT_CAP_CHARGE_DTM: NORMAL
MDC_IDC_MSMT_CAP_CHARGE_ENERGY: 18 J
MDC_IDC_MSMT_CAP_CHARGE_TIME: 3.97
MDC_IDC_MSMT_CAP_CHARGE_TYPE: NORMAL
MDC_IDC_MSMT_LEADCHNL_RA_IMPEDANCE_VALUE: 304 OHM
MDC_IDC_MSMT_LEADCHNL_RA_PACING_THRESHOLD_AMPLITUDE: 0.75 V
MDC_IDC_MSMT_LEADCHNL_RA_PACING_THRESHOLD_PULSEWIDTH: 0.4 MS
MDC_IDC_MSMT_LEADCHNL_RA_SENSING_INTR_AMPL: 0.88 MV
MDC_IDC_MSMT_LEADCHNL_RA_SENSING_INTR_AMPL: 1.5 MV
MDC_IDC_MSMT_LEADCHNL_RV_IMPEDANCE_VALUE: 608 OHM
MDC_IDC_MSMT_LEADCHNL_RV_IMPEDANCE_VALUE: 703 OHM
MDC_IDC_MSMT_LEADCHNL_RV_PACING_THRESHOLD_AMPLITUDE: 2.5 V
MDC_IDC_MSMT_LEADCHNL_RV_PACING_THRESHOLD_PULSEWIDTH: 0.4 MS
MDC_IDC_MSMT_LEADCHNL_RV_SENSING_INTR_AMPL: 0.88 MV
MDC_IDC_MSMT_LEADCHNL_RV_SENSING_INTR_AMPL: 1.12 MV
MDC_IDC_PG_IMPLANT_DTM: NORMAL
MDC_IDC_PG_MFG: NORMAL
MDC_IDC_PG_MODEL: NORMAL
MDC_IDC_PG_SERIAL: NORMAL
MDC_IDC_PG_TYPE: NORMAL
MDC_IDC_SESS_CLINIC_NAME: NORMAL
MDC_IDC_SESS_DTM: NORMAL
MDC_IDC_SESS_TYPE: NORMAL
MDC_IDC_SET_BRADY_AT_MODE_SWITCH_RATE: 171 {BEATS}/MIN
MDC_IDC_SET_BRADY_HYSTRATE: NORMAL
MDC_IDC_SET_BRADY_LOWRATE: 50 {BEATS}/MIN
MDC_IDC_SET_BRADY_MAX_SENSOR_RATE: 115 {BEATS}/MIN
MDC_IDC_SET_BRADY_MAX_TRACKING_RATE: 130 {BEATS}/MIN
MDC_IDC_SET_BRADY_MODE: NORMAL
MDC_IDC_SET_BRADY_PAV_DELAY_LOW: 350 MS
MDC_IDC_SET_BRADY_SAV_DELAY_LOW: 350 MS
MDC_IDC_SET_LEADCHNL_RA_PACING_AMPLITUDE: 1.5 V
MDC_IDC_SET_LEADCHNL_RA_PACING_ANODE_ELECTRODE_1: NORMAL
MDC_IDC_SET_LEADCHNL_RA_PACING_ANODE_LOCATION_1: NORMAL
MDC_IDC_SET_LEADCHNL_RA_PACING_CAPTURE_MODE: NORMAL
MDC_IDC_SET_LEADCHNL_RA_PACING_CATHODE_ELECTRODE_1: NORMAL
MDC_IDC_SET_LEADCHNL_RA_PACING_CATHODE_LOCATION_1: NORMAL
MDC_IDC_SET_LEADCHNL_RA_PACING_POLARITY: NORMAL
MDC_IDC_SET_LEADCHNL_RA_PACING_PULSEWIDTH: 0.4 MS
MDC_IDC_SET_LEADCHNL_RA_SENSING_ANODE_ELECTRODE_1: NORMAL
MDC_IDC_SET_LEADCHNL_RA_SENSING_ANODE_LOCATION_1: NORMAL
MDC_IDC_SET_LEADCHNL_RA_SENSING_CATHODE_ELECTRODE_1: NORMAL
MDC_IDC_SET_LEADCHNL_RA_SENSING_CATHODE_LOCATION_1: NORMAL
MDC_IDC_SET_LEADCHNL_RA_SENSING_POLARITY: NORMAL
MDC_IDC_SET_LEADCHNL_RA_SENSING_SENSITIVITY: 0.3 MV
MDC_IDC_SET_LEADCHNL_RV_PACING_AMPLITUDE: 5 V
MDC_IDC_SET_LEADCHNL_RV_PACING_ANODE_ELECTRODE_1: NORMAL
MDC_IDC_SET_LEADCHNL_RV_PACING_ANODE_LOCATION_1: NORMAL
MDC_IDC_SET_LEADCHNL_RV_PACING_CAPTURE_MODE: NORMAL
MDC_IDC_SET_LEADCHNL_RV_PACING_CATHODE_ELECTRODE_1: NORMAL
MDC_IDC_SET_LEADCHNL_RV_PACING_CATHODE_LOCATION_1: NORMAL
MDC_IDC_SET_LEADCHNL_RV_PACING_POLARITY: NORMAL
MDC_IDC_SET_LEADCHNL_RV_PACING_PULSEWIDTH: 1 MS
MDC_IDC_SET_LEADCHNL_RV_SENSING_ANODE_ELECTRODE_1: NORMAL
MDC_IDC_SET_LEADCHNL_RV_SENSING_ANODE_LOCATION_1: NORMAL
MDC_IDC_SET_LEADCHNL_RV_SENSING_CATHODE_ELECTRODE_1: NORMAL
MDC_IDC_SET_LEADCHNL_RV_SENSING_CATHODE_LOCATION_1: NORMAL
MDC_IDC_SET_LEADCHNL_RV_SENSING_POLARITY: NORMAL
MDC_IDC_SET_LEADCHNL_RV_SENSING_SENSITIVITY: 0.3 MV
MDC_IDC_SET_ZONE_DETECTION_BEATS_DENOMINATOR: 40 {BEATS}
MDC_IDC_SET_ZONE_DETECTION_BEATS_NUMERATOR: 30 {BEATS}
MDC_IDC_SET_ZONE_DETECTION_INTERVAL: 280 MS
MDC_IDC_SET_ZONE_DETECTION_INTERVAL: 320 MS
MDC_IDC_SET_ZONE_DETECTION_INTERVAL: 350 MS
MDC_IDC_SET_ZONE_DETECTION_INTERVAL: 350 MS
MDC_IDC_SET_ZONE_DETECTION_INTERVAL: 400 MS
MDC_IDC_SET_ZONE_TYPE: NORMAL
MDC_IDC_STAT_AT_BURDEN_PERCENT: 3.1 %
MDC_IDC_STAT_AT_DTM_END: NORMAL
MDC_IDC_STAT_AT_DTM_START: NORMAL
MDC_IDC_STAT_BRADY_AP_VP_PERCENT: 0.02 %
MDC_IDC_STAT_BRADY_AP_VS_PERCENT: 0.08 %
MDC_IDC_STAT_BRADY_AS_VP_PERCENT: 0.08 %
MDC_IDC_STAT_BRADY_AS_VS_PERCENT: 99.82 %
MDC_IDC_STAT_BRADY_DTM_END: NORMAL
MDC_IDC_STAT_BRADY_DTM_START: NORMAL
MDC_IDC_STAT_BRADY_RA_PERCENT_PACED: 0.1 %
MDC_IDC_STAT_BRADY_RV_PERCENT_PACED: 0.1 %
MDC_IDC_STAT_EPISODE_RECENT_COUNT: 0
MDC_IDC_STAT_EPISODE_RECENT_COUNT: 28
MDC_IDC_STAT_EPISODE_RECENT_COUNT_DTM_END: NORMAL
MDC_IDC_STAT_EPISODE_RECENT_COUNT_DTM_START: NORMAL
MDC_IDC_STAT_EPISODE_TOTAL_COUNT: 0
MDC_IDC_STAT_EPISODE_TOTAL_COUNT: 1
MDC_IDC_STAT_EPISODE_TOTAL_COUNT: 1
MDC_IDC_STAT_EPISODE_TOTAL_COUNT: 33
MDC_IDC_STAT_EPISODE_TOTAL_COUNT: 9
MDC_IDC_STAT_EPISODE_TOTAL_COUNT_DTM_END: NORMAL
MDC_IDC_STAT_EPISODE_TOTAL_COUNT_DTM_START: NORMAL
MDC_IDC_STAT_EPISODE_TYPE: NORMAL
MDC_IDC_STAT_TACHYTHERAPY_ATP_DELIVERED_RECENT: 0
MDC_IDC_STAT_TACHYTHERAPY_ATP_DELIVERED_TOTAL: 0
MDC_IDC_STAT_TACHYTHERAPY_RECENT_DTM_END: NORMAL
MDC_IDC_STAT_TACHYTHERAPY_RECENT_DTM_START: NORMAL
MDC_IDC_STAT_TACHYTHERAPY_SHOCKS_ABORTED_RECENT: 0
MDC_IDC_STAT_TACHYTHERAPY_SHOCKS_ABORTED_TOTAL: 0
MDC_IDC_STAT_TACHYTHERAPY_SHOCKS_DELIVERED_RECENT: 0
MDC_IDC_STAT_TACHYTHERAPY_SHOCKS_DELIVERED_TOTAL: 1
MDC_IDC_STAT_TACHYTHERAPY_TOTAL_DTM_END: NORMAL
MDC_IDC_STAT_TACHYTHERAPY_TOTAL_DTM_START: NORMAL
METAMYELOCYTES # BLD MANUAL: 0.1 10E3/UL
METAMYELOCYTES NFR BLD MANUAL: 1 %
MONOCYTES # BLD AUTO: 1 10E3/UL (ref 0–1.3)
MONOCYTES # BLD AUTO: 1.1 10E3/UL (ref 0–1.3)
MONOCYTES # BLD AUTO: 1.8 10E3/UL (ref 0–1.3)
MONOCYTES # BLD MANUAL: 0.9 10E3/UL (ref 0–1.3)
MONOCYTES # BLD MANUAL: 2.5 10E3/UL (ref 0–1.3)
MONOCYTES NFR BLD AUTO: 10 %
MONOCYTES NFR BLD AUTO: 9 %
MONOCYTES NFR BLD AUTO: 9 %
MONOCYTES NFR BLD MANUAL: 11 %
MONOCYTES NFR BLD MANUAL: 6 %
MUCOUS THREADS #/AREA URNS LPF: PRESENT /LPF
NEUTROPHILS # BLD AUTO: 13.9 10E3/UL (ref 1.6–8.3)
NEUTROPHILS # BLD AUTO: 8.9 10E3/UL (ref 1.6–8.3)
NEUTROPHILS # BLD AUTO: 9.5 10E3/UL (ref 1.6–8.3)
NEUTROPHILS # BLD MANUAL: 12.1 10E3/UL (ref 1.6–8.3)
NEUTROPHILS # BLD MANUAL: 19.2 10E3/UL (ref 1.6–8.3)
NEUTROPHILS NFR BLD AUTO: 78 %
NEUTROPHILS NFR BLD AUTO: 79 %
NEUTROPHILS NFR BLD AUTO: 80 %
NEUTROPHILS NFR BLD MANUAL: 83 %
NEUTROPHILS NFR BLD MANUAL: 84 %
NITRATE UR QL: NEGATIVE
NRBC # BLD AUTO: 0 10E3/UL
NRBC BLD AUTO-RTO: 0 /100
O2/TOTAL GAS SETTING VFR VENT: 30 %
OXYHGB MFR BLD: 95 % (ref 92–100)
OXYHGB MFR BLD: 96 % (ref 92–100)
OXYHGB MFR BLD: 96 % (ref 92–100)
OXYHGB MFR BLD: 97 % (ref 92–100)
OXYHGB MFR BLD: 98 % (ref 92–100)
OXYHGB MFR BLD: 98 % (ref 92–100)
PATH REV: ABNORMAL
PCO2 BLD: 32 MM HG (ref 35–45)
PCO2 BLD: 34 MM HG (ref 35–45)
PCO2 BLD: 35 MM HG (ref 35–45)
PCO2 BLD: 37 MM HG (ref 35–45)
PCO2 BLD: 37 MM HG (ref 35–45)
PCO2 BLD: 39 MM HG (ref 35–45)
PH BLD: 7.31 [PH] (ref 7.35–7.45)
PH BLD: 7.33 [PH] (ref 7.35–7.45)
PH BLD: 7.33 [PH] (ref 7.35–7.45)
PH BLD: 7.35 [PH] (ref 7.35–7.45)
PH BLD: 7.36 [PH] (ref 7.35–7.45)
PH BLD: 7.37 [PH] (ref 7.35–7.45)
PH UR STRIP: 5.5 [PH] (ref 5–7)
PHOSPHATE SERPL-MCNC: 4.8 MG/DL (ref 2.5–4.5)
PLAT MORPH BLD: ABNORMAL
PLAT MORPH BLD: ABNORMAL
PLATELET # BLD AUTO: 119 10E3/UL (ref 150–450)
PLATELET # BLD AUTO: 137 10E3/UL (ref 150–450)
PLATELET # BLD AUTO: 82 10E3/UL (ref 150–450)
PLATELET # BLD AUTO: 83 10E3/UL (ref 150–450)
PLATELET # BLD AUTO: 94 10E3/UL (ref 150–450)
PO2 BLD: 106 MM HG (ref 80–105)
PO2 BLD: 115 MM HG (ref 80–105)
PO2 BLD: 119 MM HG (ref 80–105)
PO2 BLD: 123 MM HG (ref 80–105)
PO2 BLD: 90 MM HG (ref 80–105)
PO2 BLD: 95 MM HG (ref 80–105)
POLYCHROMASIA BLD QL SMEAR: SLIGHT
POTASSIUM SERPL-SCNC: 4.3 MMOL/L (ref 3.4–5.3)
POTASSIUM SERPL-SCNC: 4.5 MMOL/L (ref 3.4–5.3)
POTASSIUM SERPL-SCNC: 4.8 MMOL/L (ref 3.4–5.3)
POTASSIUM SERPL-SCNC: 4.8 MMOL/L (ref 3.4–5.3)
RBC # BLD AUTO: 2.66 10E6/UL (ref 4.4–5.9)
RBC # BLD AUTO: 2.79 10E6/UL (ref 4.4–5.9)
RBC # BLD AUTO: 2.81 10E6/UL (ref 4.4–5.9)
RBC # BLD AUTO: 2.88 10E6/UL (ref 4.4–5.9)
RBC # BLD AUTO: 3.24 10E6/UL (ref 4.4–5.9)
RBC MORPH BLD: ABNORMAL
RBC MORPH BLD: ABNORMAL
RBC URINE: 10 /HPF
SODIUM SERPL-SCNC: 129 MMOL/L (ref 136–145)
SODIUM SERPL-SCNC: 133 MMOL/L (ref 136–145)
SODIUM SERPL-SCNC: 133 MMOL/L (ref 136–145)
SODIUM SERPL-SCNC: 134 MMOL/L (ref 136–145)
SP GR UR STRIP: 1.04 (ref 1–1.03)
SPERM #/AREA URNS HPF: PRESENT /HPF
UFH PPP CHRO-ACNC: <0.1 IU/ML
UNIT ABO/RH: NORMAL
UNIT NUMBER: NORMAL
UNIT STATUS: NORMAL
UNIT TYPE ISBT: 5100
UNIT TYPE ISBT: 6200
UNIT TYPE ISBT: 6200
UROBILINOGEN UR STRIP-MCNC: NORMAL MG/DL
WBC # BLD AUTO: 11.2 10E3/UL (ref 4–11)
WBC # BLD AUTO: 12.1 10E3/UL (ref 4–11)
WBC # BLD AUTO: 14.4 10E3/UL (ref 4–11)
WBC # BLD AUTO: 17.8 10E3/UL (ref 4–11)
WBC # BLD AUTO: 23.1 10E3/UL (ref 4–11)
WBC URINE: 7 /HPF

## 2022-07-12 PROCEDURE — 250N000011 HC RX IP 250 OP 636: Performed by: STUDENT IN AN ORGANIZED HEALTH CARE EDUCATION/TRAINING PROGRAM

## 2022-07-12 PROCEDURE — 85379 FIBRIN DEGRADATION QUANT: CPT | Performed by: SURGERY

## 2022-07-12 PROCEDURE — 87086 URINE CULTURE/COLONY COUNT: CPT | Performed by: STUDENT IN AN ORGANIZED HEALTH CARE EDUCATION/TRAINING PROGRAM

## 2022-07-12 PROCEDURE — 99233 SBSQ HOSP IP/OBS HIGH 50: CPT | Mod: 25 | Performed by: INTERNAL MEDICINE

## 2022-07-12 PROCEDURE — 74174 CTA ABD&PLVS W/CONTRAST: CPT | Mod: 26 | Performed by: RADIOLOGY

## 2022-07-12 PROCEDURE — 93325 DOPPLER ECHO COLOR FLOW MAPG: CPT | Mod: 26 | Performed by: INTERNAL MEDICINE

## 2022-07-12 PROCEDURE — 250N000009 HC RX 250: Performed by: STUDENT IN AN ORGANIZED HEALTH CARE EDUCATION/TRAINING PROGRAM

## 2022-07-12 PROCEDURE — 93287 PERI-PX DEVICE EVAL & PRGR: CPT

## 2022-07-12 PROCEDURE — 83735 ASSAY OF MAGNESIUM: CPT | Performed by: SURGERY

## 2022-07-12 PROCEDURE — 360N000076 HC SURGERY LEVEL 3, PER MIN: Performed by: THORACIC SURGERY (CARDIOTHORACIC VASCULAR SURGERY)

## 2022-07-12 PROCEDURE — 36415 COLL VENOUS BLD VENIPUNCTURE: CPT | Performed by: SURGERY

## 2022-07-12 PROCEDURE — 71045 X-RAY EXAM CHEST 1 VIEW: CPT

## 2022-07-12 PROCEDURE — 74174 CTA ABD&PLVS W/CONTRAST: CPT

## 2022-07-12 PROCEDURE — P9037 PLATE PHERES LEUKOREDU IRRAD: HCPCS | Performed by: INTERNAL MEDICINE

## 2022-07-12 PROCEDURE — 85730 THROMBOPLASTIN TIME PARTIAL: CPT | Performed by: SURGERY

## 2022-07-12 PROCEDURE — 85007 BL SMEAR W/DIFF WBC COUNT: CPT | Performed by: SURGERY

## 2022-07-12 PROCEDURE — 85384 FIBRINOGEN ACTIVITY: CPT | Performed by: SURGERY

## 2022-07-12 PROCEDURE — 250N000011 HC RX IP 250 OP 636: Performed by: THORACIC SURGERY (CARDIOTHORACIC VASCULAR SURGERY)

## 2022-07-12 PROCEDURE — 370N000017 HC ANESTHESIA TECHNICAL FEE, PER MIN: Performed by: THORACIC SURGERY (CARDIOTHORACIC VASCULAR SURGERY)

## 2022-07-12 PROCEDURE — 85396 CLOTTING ASSAY WHOLE BLOOD: CPT | Performed by: STUDENT IN AN ORGANIZED HEALTH CARE EDUCATION/TRAINING PROGRAM

## 2022-07-12 PROCEDURE — C9113 INJ PANTOPRAZOLE SODIUM, VIA: HCPCS | Performed by: STUDENT IN AN ORGANIZED HEALTH CARE EDUCATION/TRAINING PROGRAM

## 2022-07-12 PROCEDURE — 200N000002 HC R&B ICU UMMC

## 2022-07-12 PROCEDURE — P9016 RBC LEUKOCYTES REDUCED: HCPCS | Performed by: SURGERY

## 2022-07-12 PROCEDURE — 93321 DOPPLER ECHO F-UP/LMTD STD: CPT

## 2022-07-12 PROCEDURE — 83051 HEMOGLOBIN PLASMA: CPT | Performed by: SURGERY

## 2022-07-12 PROCEDURE — 93287 PERI-PX DEVICE EVAL & PRGR: CPT | Mod: 26 | Performed by: INTERNAL MEDICINE

## 2022-07-12 PROCEDURE — 94640 AIRWAY INHALATION TREATMENT: CPT | Mod: 76

## 2022-07-12 PROCEDURE — 272N000001 HC OR GENERAL SUPPLY STERILE: Performed by: THORACIC SURGERY (CARDIOTHORACIC VASCULAR SURGERY)

## 2022-07-12 PROCEDURE — 250N000011 HC RX IP 250 OP 636: Performed by: SURGERY

## 2022-07-12 PROCEDURE — 93321 DOPPLER ECHO F-UP/LMTD STD: CPT | Mod: 26 | Performed by: INTERNAL MEDICINE

## 2022-07-12 PROCEDURE — 82310 ASSAY OF CALCIUM: CPT | Performed by: SURGERY

## 2022-07-12 PROCEDURE — P9016 RBC LEUKOCYTES REDUCED: HCPCS | Performed by: STUDENT IN AN ORGANIZED HEALTH CARE EDUCATION/TRAINING PROGRAM

## 2022-07-12 PROCEDURE — 85018 HEMOGLOBIN: CPT | Performed by: SURGERY

## 2022-07-12 PROCEDURE — 82805 BLOOD GASES W/O2 SATURATION: CPT | Performed by: SURGERY

## 2022-07-12 PROCEDURE — 82330 ASSAY OF CALCIUM: CPT | Performed by: SURGERY

## 2022-07-12 PROCEDURE — 83605 ASSAY OF LACTIC ACID: CPT | Performed by: SURGERY

## 2022-07-12 PROCEDURE — 85027 COMPLETE CBC AUTOMATED: CPT | Performed by: STUDENT IN AN ORGANIZED HEALTH CARE EDUCATION/TRAINING PROGRAM

## 2022-07-12 PROCEDURE — 94640 AIRWAY INHALATION TREATMENT: CPT

## 2022-07-12 PROCEDURE — 85300 ANTITHROMBIN III ACTIVITY: CPT | Performed by: SURGERY

## 2022-07-12 PROCEDURE — 35820 EXPLORE CHEST VESSELS: CPT | Mod: 78 | Performed by: THORACIC SURGERY (CARDIOTHORACIC VASCULAR SURGERY)

## 2022-07-12 PROCEDURE — 93325 DOPPLER ECHO COLOR FLOW MAPG: CPT

## 2022-07-12 PROCEDURE — 93308 TTE F-UP OR LMTD: CPT | Mod: 26 | Performed by: INTERNAL MEDICINE

## 2022-07-12 PROCEDURE — 94799 UNLISTED PULMONARY SVC/PX: CPT

## 2022-07-12 PROCEDURE — 3E1Y38Z IRRIGATION OF PERICARDIAL CAVITY USING IRRIGATING SUBSTANCE, PERCUTANEOUS APPROACH: ICD-10-PCS | Performed by: THORACIC SURGERY (CARDIOTHORACIC VASCULAR SURGERY)

## 2022-07-12 PROCEDURE — 99291 CRITICAL CARE FIRST HOUR: CPT | Mod: 24 | Performed by: ANESTHESIOLOGY

## 2022-07-12 PROCEDURE — 93005 ELECTROCARDIOGRAM TRACING: CPT

## 2022-07-12 PROCEDURE — 85610 PROTHROMBIN TIME: CPT | Performed by: SURGERY

## 2022-07-12 PROCEDURE — 250N000024 HC ISOFLURANE, PER MIN: Performed by: THORACIC SURGERY (CARDIOTHORACIC VASCULAR SURGERY)

## 2022-07-12 PROCEDURE — 999N000015 HC STATISTIC ARTERIAL MONITORING DAILY

## 2022-07-12 PROCEDURE — 80048 BASIC METABOLIC PNL TOTAL CA: CPT | Performed by: SURGERY

## 2022-07-12 PROCEDURE — 85730 THROMBOPLASTIN TIME PARTIAL: CPT | Performed by: STUDENT IN AN ORGANIZED HEALTH CARE EDUCATION/TRAINING PROGRAM

## 2022-07-12 PROCEDURE — 36415 COLL VENOUS BLD VENIPUNCTURE: CPT | Performed by: INTERNAL MEDICINE

## 2022-07-12 PROCEDURE — 250N000013 HC RX MED GY IP 250 OP 250 PS 637: Performed by: SURGERY

## 2022-07-12 PROCEDURE — 999N000185 HC STATISTIC TRANSPORT TIME EA 15 MIN

## 2022-07-12 PROCEDURE — 0W3D0ZZ CONTROL BLEEDING IN PERICARDIAL CAVITY, OPEN APPROACH: ICD-10-PCS | Performed by: THORACIC SURGERY (CARDIOTHORACIC VASCULAR SURGERY)

## 2022-07-12 PROCEDURE — 94003 VENT MGMT INPAT SUBQ DAY: CPT

## 2022-07-12 PROCEDURE — 85347 COAGULATION TIME ACTIVATED: CPT

## 2022-07-12 PROCEDURE — 85027 COMPLETE CBC AUTOMATED: CPT | Performed by: SURGERY

## 2022-07-12 PROCEDURE — 250N000012 HC RX MED GY IP 250 OP 636 PS 637: Performed by: STUDENT IN AN ORGANIZED HEALTH CARE EDUCATION/TRAINING PROGRAM

## 2022-07-12 PROCEDURE — 87040 BLOOD CULTURE FOR BACTERIA: CPT | Performed by: SURGERY

## 2022-07-12 PROCEDURE — 71275 CT ANGIOGRAPHY CHEST: CPT | Mod: 26 | Performed by: RADIOLOGY

## 2022-07-12 PROCEDURE — 85520 HEPARIN ASSAY: CPT | Performed by: SURGERY

## 2022-07-12 PROCEDURE — 250N000013 HC RX MED GY IP 250 OP 250 PS 637: Performed by: STUDENT IN AN ORGANIZED HEALTH CARE EDUCATION/TRAINING PROGRAM

## 2022-07-12 PROCEDURE — 84460 ALANINE AMINO (ALT) (SGPT): CPT | Performed by: SURGERY

## 2022-07-12 PROCEDURE — 258N000003 HC RX IP 258 OP 636: Performed by: STUDENT IN AN ORGANIZED HEALTH CARE EDUCATION/TRAINING PROGRAM

## 2022-07-12 PROCEDURE — 93010 ELECTROCARDIOGRAM REPORT: CPT | Performed by: INTERNAL MEDICINE

## 2022-07-12 PROCEDURE — 74018 RADEX ABDOMEN 1 VIEW: CPT | Mod: 26 | Performed by: RADIOLOGY

## 2022-07-12 PROCEDURE — 82803 BLOOD GASES ANY COMBINATION: CPT | Performed by: SURGERY

## 2022-07-12 PROCEDURE — 999N000157 HC STATISTIC RCP TIME EA 10 MIN

## 2022-07-12 PROCEDURE — 81001 URINALYSIS AUTO W/SCOPE: CPT | Performed by: STUDENT IN AN ORGANIZED HEALTH CARE EDUCATION/TRAINING PROGRAM

## 2022-07-12 PROCEDURE — 84100 ASSAY OF PHOSPHORUS: CPT | Performed by: SURGERY

## 2022-07-12 PROCEDURE — P9016 RBC LEUKOCYTES REDUCED: HCPCS | Performed by: INTERNAL MEDICINE

## 2022-07-12 PROCEDURE — 87077 CULTURE AEROBIC IDENTIFY: CPT | Performed by: STUDENT IN AN ORGANIZED HEALTH CARE EDUCATION/TRAINING PROGRAM

## 2022-07-12 PROCEDURE — 999N000253 HC STATISTIC WEANING TRIALS

## 2022-07-12 PROCEDURE — 71045 X-RAY EXAM CHEST 1 VIEW: CPT | Mod: 26 | Performed by: RADIOLOGY

## 2022-07-12 PROCEDURE — 999N000065 XR ABDOMEN PORT 1 VIEWS

## 2022-07-12 PROCEDURE — 87040 BLOOD CULTURE FOR BACTERIA: CPT | Performed by: INTERNAL MEDICINE

## 2022-07-12 PROCEDURE — 87205 SMEAR GRAM STAIN: CPT | Performed by: STUDENT IN AN ORGANIZED HEALTH CARE EDUCATION/TRAINING PROGRAM

## 2022-07-12 RX ORDER — HYDRALAZINE HYDROCHLORIDE 10 MG/1
30 TABLET, FILM COATED ORAL EVERY 8 HOURS SCHEDULED
Status: DISCONTINUED | OUTPATIENT
Start: 2022-07-12 | End: 2022-07-12

## 2022-07-12 RX ORDER — HYDROXYCHLOROQUINE SULFATE 200 MG/1
200 TABLET, FILM COATED ORAL 2 TIMES DAILY
Status: DISCONTINUED | OUTPATIENT
Start: 2022-07-12 | End: 2022-07-13

## 2022-07-12 RX ORDER — FIBRINOGEN (HUMAN) 700-1300MG
1.1 KIT INTRAVENOUS
Status: DISCONTINUED | OUTPATIENT
Start: 2022-07-12 | End: 2022-07-13

## 2022-07-12 RX ORDER — ASPIRIN 81 MG/1
81 TABLET, CHEWABLE ORAL DAILY
Status: DISCONTINUED | OUTPATIENT
Start: 2022-07-12 | End: 2022-08-21 | Stop reason: HOSPADM

## 2022-07-12 RX ORDER — NOREPINEPHRINE BITARTRATE 0.06 MG/ML
.01-.6 INJECTION, SOLUTION INTRAVENOUS CONTINUOUS
Status: DISCONTINUED | OUTPATIENT
Start: 2022-07-12 | End: 2022-07-12 | Stop reason: HOSPADM

## 2022-07-12 RX ORDER — HYDRALAZINE HYDROCHLORIDE 10 MG/1
10 TABLET, FILM COATED ORAL ONCE
Status: DISCONTINUED | OUTPATIENT
Start: 2022-07-12 | End: 2022-07-12

## 2022-07-12 RX ORDER — PROPOFOL 10 MG/ML
INJECTION, EMULSION INTRAVENOUS CONTINUOUS PRN
Status: DISCONTINUED | OUTPATIENT
Start: 2022-07-12 | End: 2022-07-12

## 2022-07-12 RX ORDER — SODIUM CHLORIDE, SODIUM GLUCONATE, SODIUM ACETATE, POTASSIUM CHLORIDE AND MAGNESIUM CHLORIDE 526; 502; 368; 37; 30 MG/100ML; MG/100ML; MG/100ML; MG/100ML; MG/100ML
INJECTION, SOLUTION INTRAVENOUS CONTINUOUS PRN
Status: DISCONTINUED | OUTPATIENT
Start: 2022-07-12 | End: 2022-07-12

## 2022-07-12 RX ORDER — IOPAMIDOL 755 MG/ML
90 INJECTION, SOLUTION INTRAVASCULAR ONCE
Status: COMPLETED | OUTPATIENT
Start: 2022-07-12 | End: 2022-07-12

## 2022-07-12 RX ORDER — DEXMEDETOMIDINE HYDROCHLORIDE 4 UG/ML
.1-1.2 INJECTION, SOLUTION INTRAVENOUS CONTINUOUS
Status: DISCONTINUED | OUTPATIENT
Start: 2022-07-12 | End: 2022-07-13

## 2022-07-12 RX ADMIN — INSULIN ASPART 4 UNITS: 100 INJECTION, SOLUTION INTRAVENOUS; SUBCUTANEOUS at 11:43

## 2022-07-12 RX ADMIN — INSULIN ASPART 3 UNITS: 100 INJECTION, SOLUTION INTRAVENOUS; SUBCUTANEOUS at 16:14

## 2022-07-12 RX ADMIN — PROPOFOL 25 MCG/KG/MIN: 10 INJECTION, EMULSION INTRAVENOUS at 22:00

## 2022-07-12 RX ADMIN — VANCOMYCIN HYDROCHLORIDE 1250 MG: 10 INJECTION, POWDER, LYOPHILIZED, FOR SOLUTION INTRAVENOUS at 10:30

## 2022-07-12 RX ADMIN — PROPOFOL 20 MCG/KG/MIN: 10 INJECTION, EMULSION INTRAVENOUS at 12:04

## 2022-07-12 RX ADMIN — CEFEPIME HYDROCHLORIDE 2 G: 2 INJECTION, POWDER, FOR SOLUTION INTRAVENOUS at 18:52

## 2022-07-12 RX ADMIN — PANTOPRAZOLE SODIUM 40 MG: 40 INJECTION, POWDER, FOR SOLUTION INTRAVENOUS at 11:48

## 2022-07-12 RX ADMIN — CEFEPIME HYDROCHLORIDE 2 G: 2 INJECTION, POWDER, FOR SOLUTION INTRAVENOUS at 09:25

## 2022-07-12 RX ADMIN — FLUTICASONE PROPIONATE AND SALMETEROL XINAFOATE 2 PUFF: 115; 21 AEROSOL, METERED RESPIRATORY (INHALATION) at 19:54

## 2022-07-12 RX ADMIN — ACETAMINOPHEN 975 MG: 325 TABLET, FILM COATED ORAL at 14:56

## 2022-07-12 RX ADMIN — SODIUM CHLORIDE, SODIUM GLUCONATE, SODIUM ACETATE, POTASSIUM CHLORIDE AND MAGNESIUM CHLORIDE: 526; 502; 368; 37; 30 INJECTION, SOLUTION INTRAVENOUS at 22:00

## 2022-07-12 RX ADMIN — FLUTICASONE PROPIONATE AND SALMETEROL XINAFOATE 2 PUFF: 115; 21 AEROSOL, METERED RESPIRATORY (INHALATION) at 08:08

## 2022-07-12 RX ADMIN — Medication 40 MG: at 08:18

## 2022-07-12 RX ADMIN — HYDRALAZINE HYDROCHLORIDE 20 MG: 10 TABLET, FILM COATED ORAL at 06:14

## 2022-07-12 RX ADMIN — Medication 1 PACKET: at 08:19

## 2022-07-12 RX ADMIN — MUPIROCIN 1 G: 20 OINTMENT TOPICAL at 08:20

## 2022-07-12 RX ADMIN — DULOXETINE 30 MG: 30 CAPSULE, DELAYED RELEASE ORAL at 10:29

## 2022-07-12 RX ADMIN — Medication 1 PACKET: at 14:56

## 2022-07-12 RX ADMIN — PROPOFOL 60 MCG/KG/MIN: 10 INJECTION, EMULSION INTRAVENOUS at 00:13

## 2022-07-12 RX ADMIN — Medication 150 MCG/HR: at 13:31

## 2022-07-12 RX ADMIN — CEFEPIME HYDROCHLORIDE 2 G: 2 INJECTION, POWDER, FOR SOLUTION INTRAVENOUS at 02:45

## 2022-07-12 RX ADMIN — ACETAMINOPHEN 975 MG: 325 TABLET, FILM COATED ORAL at 06:14

## 2022-07-12 RX ADMIN — DEXMEDETOMIDINE HYDROCHLORIDE 1.2 MCG/KG/HR: 400 INJECTION INTRAVENOUS at 20:24

## 2022-07-12 RX ADMIN — HYDROXYCHLOROQUINE SULFATE 200 MG: 200 TABLET, FILM COATED ORAL at 20:41

## 2022-07-12 RX ADMIN — DEXMEDETOMIDINE HYDROCHLORIDE 0.2 MCG/KG/HR: 400 INJECTION INTRAVENOUS at 15:22

## 2022-07-12 RX ADMIN — VANCOMYCIN HYDROCHLORIDE 1250 MG: 10 INJECTION, POWDER, LYOPHILIZED, FOR SOLUTION INTRAVENOUS at 21:25

## 2022-07-12 RX ADMIN — PROPOFOL 50 MCG/KG/MIN: 10 INJECTION, EMULSION INTRAVENOUS at 04:55

## 2022-07-12 RX ADMIN — PANTOPRAZOLE SODIUM 40 MG: 40 INJECTION, POWDER, FOR SOLUTION INTRAVENOUS at 20:25

## 2022-07-12 RX ADMIN — INSULIN ASPART 2 UNITS: 100 INJECTION, SOLUTION INTRAVENOUS; SUBCUTANEOUS at 20:02

## 2022-07-12 RX ADMIN — HYDROXYCHLOROQUINE SULFATE 200 MG: 200 TABLET, FILM COATED ORAL at 10:29

## 2022-07-12 RX ADMIN — Medication 1 PACKET: at 20:41

## 2022-07-12 RX ADMIN — HEPARIN SODIUM AND DEXTROSE 400 UNITS/HR: 10000; 5 INJECTION INTRAVENOUS at 10:35

## 2022-07-12 RX ADMIN — MULTIVITAMIN 15 ML: LIQUID ORAL at 08:18

## 2022-07-12 RX ADMIN — ASPIRIN 81 MG CHEWABLE TABLET 81 MG: 81 TABLET CHEWABLE at 10:29

## 2022-07-12 RX ADMIN — IOPAMIDOL 90 ML: 755 INJECTION, SOLUTION INTRAVENOUS at 18:06

## 2022-07-12 ASSESSMENT — ACTIVITIES OF DAILY LIVING (ADL)
ADLS_ACUITY_SCORE: 36

## 2022-07-12 NOTE — PLAN OF CARE
Neuro: Pt sedated on Propofol and Fentanyl. Pupils equal and reactive. Pt withdraws to pain in all extremities. When sedation is decreased, pt moves all extremities but not to command. Low temperature of 96.6.    Cardiac: SR/ST. Hr 80s-100s. MAPs>65.  LVAD and RVAD without alarms.     Respiratory: On CMV (RR 12, , Peep 5) at 30%. Lung sounds clear/coarse and diminished. Small amount of red/brown, thick secretions present orally. Small amount of thick, cloudy secretions present in ETT.    GI/: NJ placement confirmed; tube feeding started and titrated per order. LBM 7/11. Urine output 15-20ml/hr.     Incisons: Chest incision dressing CDI. Mediastinal chest tube x2 with 20-30ml/hr of sanguinous output present. Pleural & pericardial chest tubes with 20-30m/hr of sanguinous output present. Mediastinal chest tube x1 with 0-40ml/hr of sanguinous output present.     Lines: PIVx1. R PICCx3. L groin CVC x2. L groin arterial line. R radial arterial line - monitoring BP from this line.     Intervention: At ~0030, Nitric Oxide weaned from 20 to 10 - RVAD flow remained unchanged. At ~0400, Nitric Oxide weaned from 10 to 5 - RVAD flows decreased slightly from 3.6 to 3.45.     Plan: Continue to monitor and update MD as needed. Continue plan of care.     Daughter and son at bedside until ~2000. Daughter called RN for update at ~0130.     Goal Outcome Evaluation:  Plan of Care Reviewed With: patient   Overall Patient Progress: improving

## 2022-07-12 NOTE — PROGRESS NOTES
VAD Coordinator in OR until 1730 of I&D surgery.  (VAD coordinator needed in second case)  VAD parameters were monitored in collaboration with anesthesia. Report given to anesthesia RN upon leaving the OR.       VAD parameters at START of surgery:  o Flow: 3.2 lpm  o Speed: 5200 rpm  o PI (or flow waveform peak/trough for HW): 3.4  o Power: 3.4 persaud    VAD parameters at 1730 (OR ending):  o Flow: 3.0 lpm  o Speed: 5200 rpm  o PI (or flow waveform peak/trough for HW): 7.9  o Power: 3.7 persaud    Speed adjustments made during OR: None    Flow range: 1.8 - 3.2 lpm    PI (or flow waveform peak/trough for HW) range: 3.4 - 7.9    Factors noted to cause a significant variation in VAD parameters: MAP's and anesthesia    Other significant events: none    Please page the VAD Coordinator on-call with any VAD related questions (* [phone], job code [phone] from an internal line).     Denise Villar RN

## 2022-07-12 NOTE — PLAN OF CARE
Goal Outcome Evaluation:    Plan of Care Reviewed With: patient     Overall Patient Progress: improving    Outcome Evaluation: TF increasing to goal

## 2022-07-12 NOTE — PROGRESS NOTES
RVAD PROGRESS NOTE:    29 Fr ProtekDuo remains present in RIJ. On 4050 RPMs flow ~3.4 LPM. (HM3 LVAD 5200 RPMs, ~3.2 LPM) Fibrin noted at some connectors in the circuit. ACT goal 140-160, was 177 so Hepain gtt that was just initiated was held.    Noted to have wounds on forehead and right side of pts forehead from tight koban dressing that was keeping the cannula in place. Added mepilex, optifoam and a new loosened koban dressing. WOC consult requested.     Given one unit of PRBCs for drifting Hgb, CTs put out ~50 mls/hr.    Will cont to follow.

## 2022-07-12 NOTE — PROGRESS NOTES
CV ICU PROGRESS NOTE  07/12/2022        CO-MORBIDITIES  1. Cardiogenic shock (H)  (primary encounter diagnosis)  2. Ischemic cardiomyopathy  3. Heart failure with reduced ejection fraction, NYHA class III (H)  4. Coronary artery disease involving native coronary artery of native heart without angina pectoris      ASSESSMENT Dandy Sands is a 61 year old male with a PMH of CAD s/p PCI to LAD, MI, ICM, acute on chronic heart failure, s/p CRT-D, severe MR, antiphospholipid antibody syndrome on chronic warfarin, SLE, HTN, HLD, recent hospitalization 5/16-5/26 s/p RCA, LAD stenting who underwent RVAD (29F Protek duo RIJ) LVAD placement (HeartMate 3) on 7/8/22 by Dr. Zamora. Intraoperative course complicated by IABP dysfunction and RV compromise, requiring crash onto bypass, vasopressor, NO, and protek placement. Now s/p chest closure 7/11.         Changes 7/12:   - Wean NO 1st, vent 2nd  - Discontinue Insulin GTT  - Start HD sliding scale insulin   - AC Plan: Straight rate heparin 400U/hr, titrated to goal -160  - Discontinue all abx 24h after chest closure (MN tonight)  - ASA 81mg  - Remove: femoral arterial line   - Tachycardia - pan culture (Bcx x2, UA/UC, endotracheal sputum culture)    PLAN:   Neuro/ pain/ sedation:  #Acute Postoperative pain  #Depression  #Anxiety due to a medical condition  #Insomnia  - Monitor neurological status. Notify the MD for any acute changes in exam.  - Pain: fentanyl gtt. Scheduled tylenol. PRN tylenol, oxycodone, dilaudid.  - Sedation: propofol gtt  - Duloxetine 30mg  - Holding PTA meds: hydroxyzine 25mg PRN, zolpidem 5mg, melatonin 3mg, lorazepam 0.5mg    Pulmonary:   #Acute hypoxic respiratory failure on iMV  #Mild-moderate emphysema  #History of COVID-19  - On NO 5 PPM, plan to wean off today   - Mechanical ventilation, wean after off NO     Cardiovascular:    #S/p LVAD placement (HeartMate 3) on 7/8/22   #S/p RVAD (29F Protek duo RIJ) on 7/8/22  #S/p chest closure  Airway  Urgency: elective    Airway not difficult    General Information and Staff    Patient location during procedure: OR  Anesthesiologist: IMAN TREVINO  CRNA: TALIB COLEMAN    Indications and Patient Condition  Indications for airway management: airway protection    Preoxygenated: yes  Mask difficulty assessment: 0 - not attempted    Final Airway Details  Final airway type: supraglottic airway      Successful airway: classic  Size 4    Number of attempts at approach: 1    Additional Comments  LMA inserted without difficulty.  Lips and teeth intact as preop condition.  No signs or symptoms of gastric regurgitation.  Seal with minimal pressure and secure.             7/11  #Cardiogenic shock  #Advanced ischemic cardiomyopathy, class IV, stage D  #CAD s/p PCI to LAD (2005)  #S/p CRT-D (2006)  #History of MI (2007)  #Severe MR  #Antiphospholipid antibody syndrome on chronic warfarin  #HTN, HLD  #Recent hospitalization 5/16-5/26 s/p RCA, LAD stenting  #Atrial fibrillation   - Monitor hemodynamic status. Chest closure and oxygenator splicing 7/11.   - Goal MAP > 65   - Wean off nitric oxide as tolerated  - PTA meds: atorvastatin 40mg, clopidogrel 75mg, lasix 40mg, warfarin 5mg  - LVAD management per Advanced HF service  - AC Plan: Straight rate heparin 400U/hr, titrated to goal -160     GI / Nutrition:   #GERD  #Congestive hepatopathy in the setting of cardiac insuffiency  - NPO 2/2 intubation   - Titrate NJT to goal  - PPI  - Bowel regimen (miralax / senna)  - Nutrition consulted.  - Trend LFT's  - Hematemesis x1 (oral, around ETT/ETT clear). Further evaluation w/ hgb, pantoprazole IV BID     Renal/ Fluid Balance:    - Strict I/O, daily weights  - Avoid/limit nephrotoxins as able  - Replete lytes PRN per protocol  - PTA meds: tamsulosin 0.4mg (held)  - Fluid goal net even      Endocrine:    #Stress induced hyperglycemia  Preop A1c 5.5%  - Insulin gtt  - Goal BG <180 for optimal healing      ID/ Antibiotics:  #Periopearive antibiosis  - Cefepime, vancomycin, rifampin, fluconazole   - Discontinue all abx at midnight   - Trend fever curve   - Tachycardia - pan culture (Bcx x2, UA/UC, endotracheal sputum culture)    Heme:     #Acute blood loss anemia  #Acute blood loss thrombocytopenia  #History of DVT and PE   #Antiphospholipid antibody syndrome  #History of iron deficiency anemia  - Monitor for s/sx of bleeding  - Trend CBC and coags.  - Hgb goal > 8  - Plt goal > 20    Rheumatology:  #SLE  - Chronic, symptoms include arthritis, photosensitivity, fatigue, and skin manifestations  - PTA meds: hydroxychloroquine 200mg, restated     Prophylaxis:    - DVT prophylaxis: SCD, heparin  -160 goal   - PPI  - Bowel regimen  - PT/OT      Disposition:  - CV ICU.        ====================================================    Patient seen, findings and plan discussed with CV ICU staff, Dr. Samano.      Rashard Stephenson MD  Anesthesiology, PGY3    ====================================================        SUBJECTIVE  - NAEON, s/p NJT placement; chest closure and oxygenator splicing.  - Intubated and sedated.      OBJECTIVE    1. VITAL SIGNS    Temp:  [96.4  F (35.8  C)-100  F (37.8  C)] 100  F (37.8  C)  Pulse:  [] 127  Resp:  [12-16] 14  MAP:  [74 mmHg-104 mmHg] 83 mmHg  Arterial Line BP: ()/(54-91) 90/68  FiO2 (%):  [30 %-40 %] 30 %  SpO2:  [79 %-100 %] 98 %  Vent Mode: CMV/AC  (Continuous Mandatory Ventilation/ Assist Control)  FiO2 (%): 30 %  Resp Rate (Set): 12 breaths/min  Tidal Volume (Set, mL): 450 mL  PEEP (cm H2O): 5 cmH2O  Resp: 14        2. INTAKE/ OUTPUT    I/O last 3 completed shifts:  In: 2484.66 [I.V.:1879.66; NG/GT:465]  Out: 2171 [Urine:527; Blood:100; Chest Tube:1544]      3. PHYSICAL EXAMINATION    General: sedated  Neuro: RAAS -2   Resp: intubated  CV: SR  Abdomen: Soft, Non-distended  Skin/Incisions: chest closed, right internal jugular cannulation, left femoral CVC/arterial line.  Extremities: warm and well perfused. LUE ecchymosis, unchanged      4. INVESTIGATIONS    Arterial Blood Gases   Recent Labs   Lab 07/12/22  0801 07/12/22  0350 07/11/22  2343 07/11/22  1934   PH 7.36 7.35 7.37 7.40   PCO2 37 39 37 35   PO2 115* 95 119* 170*   HCO3 21 22 21 22     Complete Blood Count   Recent Labs   Lab 07/12/22  1007 07/12/22  0349 07/11/22 2343 07/11/22 1932   WBC 17.8* 12.1* 11.2* 11.5*   HGB 7.7* 8.2* 8.1* 9.0*   * 94* 83* 73*     Basic Metabolic Panel  Recent Labs   Lab 07/12/22  1007 07/12/22  0801 07/12/22  0559 07/12/22  0355 07/12/22 0349 07/11/22 2347 07/11/22 2343 07/11/22 1936 07/11/22 1932   *  --   --   --  134*  --  133*  --  135*    POTASSIUM 4.8  --   --   --  4.5  --  4.3  --  4.2   CHLORIDE 104  --   --   --  106  --  105  --  105   CO2 17*  --   --   --  20*  --  20*  --  20*   BUN 24.3*  --   --   --  19.8  --  18.2  --  16.9   CR 0.86  --   --   --  0.66*  --  0.68  --  0.66*   * 164* 122* 164* 153*   < > 102*   < > 99    < > = values in this interval not displayed.     Liver Function Tests  Recent Labs   Lab 07/12/22  1007 07/12/22  0349 07/11/22  2343 07/11/22  1932 07/11/22  0518 07/11/22  0452 07/11/22  0337 07/10/22  0958 07/10/22  0349 07/09/22  0952 07/09/22  0408 07/08/22  2213 07/08/22  1843 07/08/22  1643 07/08/22  1400 07/08/22  0332 07/07/22  0356   AST  --   --   --   --   --   --   --   --   --   --   --  159*  --  134*  --  32 32   ALT  --  21  --   --   --  19  --   --  21  --  29 29  --  25  --  39 41   ALKPHOS  --   --   --   --   --   --   --   --   --   --   --  59  --  51  --  127 125   BILITOTAL  --   --   --   --   --   --   --   --   --   --   --  1.0  --  1.0  --  0.3 0.3   ALBUMIN  --  2.3*  --   --   --   --  0.6*  --  3.1*  --  2.9* 2.5*  --  2.3*  --  3.5 3.5   INR 1.28* 1.26* 1.27* 1.23*   < >  --   --    < > 1.24*   < > 1.22* 1.45*   < > 1.87*   < > 1.21* 1.13    < > = values in this interval not displayed.     Pancreatic Enzymes  No lab results found in last 7 days.  Coagulation Profile  Recent Labs   Lab 07/12/22  1007 07/12/22  0349 07/11/22  2343 07/11/22  1932   INR 1.28* 1.26* 1.27* 1.23*   PTT 51* 52* 55* 60*         5. RADIOLOGY    Recent Results (from the past 24 hour(s))   XR Feeding Tube Placement    Narrative    FEEDING TUBE PLACEMENT 7/11/2022 2:00 PM    INDICATION: Nutritional needs, open chest     COMPARISON: None.    FLUOROSCOPY TIME: 2.05 minutes    FINDINGS: The feeding tube was advanced under fluoroscopic guidance  with final position of tip in the second portion of the duodenum. A  small amount of barium was injected to demonstrate placement within  the small bowel. The tube was  flushed with sterile water and secured  via nasal bridle. There were no complications of the procedure.   Partially visualized chest tubes/drains and IVC filter.      Impression    IMPRESSION: Uncomplicated feeding tube placement with tip in the  second portion of the duodenum.    I, JOSE M OSHEA MD, attest that I was present for all critical  portions of the procedure and was immediately available to provide  guidance and assistance during the remainder of the procedure.    I have personally reviewed the examination and initial interpretation  and I agree with the findings.    JOSE M OSHEA MD         SYSTEM ID:  [id number]   Cardiac Device Check - Inpatient   Result Value    Date Time Interrogation Session [id number]    Implantable Pulse Generator  Medtronic    Implantable Pulse Generator Model [id number] Evera XT DR    Implantable Pulse Generator Serial Number [id number]    Type Interrogation Session In Clinic    Clinic Name [facility]    Implantable Pulse Generator Type Defibrillator    Implantable Pulse Generator Implant Date 20180412    Implantable Lead  Medtronic    Implantable Lead Model 5076 CapSureFix Novus    Implantable Lead Serial Number [id number]    Implantable Lead Implant Date 20060306    Implantable Lead Polarity Type Bipolar Lead    Implantable Lead Location Detail 1 UNKNOWN    Implantable Lead Location Right Atrium    Implantable Lead  Medtronic    Implantable Lead Model 6949 Sprint [place]    Implantable Lead Serial Number [id number]    Implantable Lead Implant Date 20060306    Implantable Lead Polarity Type Bipolar Lead    Implantable Lead Location Detail 1 UNKNOWN    Implantable Lead Location Right Ventricle    Marcos Setting Mode (NBG Code) MVP_AAI_DDD    Marcos Setting Lower Rate Limit 50    Marcos Setting Maximum Tracking Rate 130    Marcos Setting Maximum Sensor Rate 115    Marcos Setting Hysterisis Rate DISABLED    Marcos Setting TRISTEN  Delay Low 350    Marcos Setting PAV Delay Low 350    Marcos Setting AT Mode Switch Rate 171    Lead Channel Setting Sensing Polarity Bipolar    Lead Channel Setting Sensing Anode Location Right Atrium    Lead Channel Setting Sensing Anode Terminal Ring    Lead Channel Setting Sensing Cathode Location Right Atrium    Lead Channel Setting Sensing Cathode Terminal Tip    Lead Channel Setting Sensing Sensitivity 0.3    Lead Channel Setting Sensing Polarity Bipolar    Lead Channel Setting Sensing Anode Location Right Ventricle    Lead Channel Setting Sensing Anode Terminal Ring    Lead Channel Setting Sensing Cathode Location Right Ventricle    Lead Channel Setting Sensing Cathode Terminal Tip    Lead Channel Setting Sensing Sensitivity 0.3    Lead Channel Setting Pacing Polarity Bipolar    Lead Channel Setting Pacing Anode Location Right Atrium    Lead Channel Setting Pacing Anode Terminal Ring    Lead Channel Setting Sensing Cathode Location Right Atrium    Lead Channel Setting Sensing Cathode Terminal Tip    Lead Channel Setting Pacing Pulse Width 0.4    Lead Channel Setting Pacing Amplitude 1.5    Lead Channel Setting Pacing Capture Mode Adaptive    Lead Channel Setting Pacing Polarity Bipolar    Lead Channel Setting Pacing Anode Location Right Ventricle    Lead Channel Setting Pacing Anode Terminal Ring    Lead Channel Setting Sensing Cathode Location Right Ventricle    Lead Channel Setting Sensing Cathode Terminal Tip    Lead Channel Setting Pacing Pulse Width 1    Lead Channel Setting Pacing Amplitude 5    Lead Channel Setting Pacing Capture Mode Adaptive    Zone Setting Type Category VF    Zone Setting Detection Interval 320    Zone Setting Detection Beats Numerator 30    Zone Setting Detection Beats Denominator 40    Zone Setting Type Category VT    Zone Setting Detection Interval 280    Zone Setting Type Category VT    Zone Setting Detection Interval 350    Zone Setting Type Category VT    Zone Setting Detection  Interval 400    Zone Setting Type Category ATRIAL_FIBRILLATION    Zone Setting Type Category AT/AF    Zone Setting Detection Interval 350    Lead Channel Impedance Value 304    Lead Channel Sensing Intrinsic Amplitude 0.875    Lead Channel Sensing Intrinsic Amplitude 1.5    Lead Channel Pacing Threshold Amplitude 0.75    Lead Channel Pacing Threshold Pulse Width 0.4    Lead Channel Impedance Value 703    Lead Channel Impedance Value 608    Lead Channel Sensing Intrinsic Amplitude 0.875    Lead Channel Sensing Intrinsic Amplitude 1.125    Lead Channel Pacing Threshold Amplitude 2.5    Lead Channel Pacing Threshold Pulse Width 0.4    Battery Date Time of Measurements 55943361072949    Battery Status OK    Battery RRT Trigger 2.727    Battery Remaining Longevity 63    Battery Voltage 2.96    Capacitor Charge Type Reformation    Capacitor Last Charge Date Time 20220528111827    Capacitor Charge Time 3.973    Capacitor Charge Energy 18    Marcos Statistic Date Time Start 20220708170240    Marcos Statistic Date Time End 20220711160616    Marcos Statistic RA Percent Paced 0.1    Marcos Statistic RV Percent Paced 0.1    Marcos Statistic AP  Percent 0.02    Marcos Statistic AS  Percent 0.08    Marcos Statistic AP VS Percent 0.08    Marcos Statistic AS VS Percent 99.82    Atrial Tachy Statistic Date Time Start 20220708170240    Atrial Tachy Statistic Date Time End 20220711160616    Atrial Tachy Statistic AT/AF [place] Percent 3.1    Therapy Statistic Recent Shocks Delivered 0    Therapy Statistic Recent Shocks Aborted 0    Therapy Statistic Recent ATP Delivered 0    Therapy Statistic Recent Date Time Start 20220708170240    Therapy Statistic Recent Date Time End 20220711160616    Therapy Statistic Total Shocks Delivered 1    Therapy Statistic Total Shocks Aborted 0    Therapy Statistic Total ATP Delivered 0    Therapy Statistic Total  Date Time Start 30192218964944    Therapy Statistic Total  Date Time End 20220711160616     Episode Statistic Recent Count 28    Episode Statistic Type Category AT/AF    Episode Statistic Recent Count 0    Episode Statistic Type Category SVT    Episode Statistic Recent Count 0    Episode Statistic Type Category VT    Episode Statistic Recent Count 0    Episode Statistic Type Category VF    Episode Statistic Recent Count 0    Episode Statistic Type Category VT    Episode Statistic Recent Count 0    Episode Statistic Type Category VT    Episode Statistic Recent Count 0    Episode Statistic Type Category VT    Episode Statistic Recent Date Time Start 19504999223951    Episode Statistic Recent Date Time End 47650063608377    Episode Statistic Recent Date Time Start 99940646062103    Episode Statistic Recent Date Time End 15027958943561    Episode Statistic Recent Date Time Start 38361259466605    Episode Statistic Recent Date Time End 87867046064511    Episode Statistic Recent Date Time Start 99344900904783    Episode Statistic Recent Date Time End 78977694677686    Episode Statistic Recent Date Time Start 80404322164975    Episode Statistic Recent Date Time End 89154764429034    Episode Statistic Recent Date Time Start 75750774565455    Episode Statistic Recent Date Time End 80829582443174    Episode Statistic Recent Date Time Start 31669634956451    Episode Statistic Recent Date Time End 29025840875216    Episode Statistic Total Count 33    Episode Statistic Type Category AT/AF    Episode Statistic Total Count 0    Episode Statistic Type Category SVT    Episode Statistic Total Count 9    Episode Statistic Type Category VT    Episode Statistic Total Count 1    Episode Statistic Type Category VF    Episode Statistic Total Count 0    Episode Statistic Type Category VT    Episode Statistic Total Count 0    Episode Statistic Type Category VT    Episode Statistic Total Count 1    Episode Statistic Type Category VT    Episode Statistic Total Date Time Start 35477741158542    Episode Statistic Total Date Time End  93265223823388    Episode Statistic Total Date Time Start 03610204550332    Episode Statistic Total Date Time End 29702705872258    Episode Statistic Total Date Time Start 95822532105091    Episode Statistic Total Date Time End 67932680780566    Episode Statistic Total Date Time Start 84242408519647    Episode Statistic Total Date Time End 01954167766396    Episode Statistic Total Date Time Start 13057700268109    Episode Statistic Total Date Time End 33880120252777    Episode Statistic Total Date Time Start 88901352355115    Episode Statistic Total Date Time End 29521180684488    Episode Statistic Total Date Time Start 78228076080880    Episode Statistic Total Date Time End 60736859943379    Episode Identifier 56    Episode Type Category Monitor    Episode Date Time 41635401022525    Episode Duration 61    Episode Identifier 55    Episode Type Category Monitor    Episode Date Time 14235679523376    Episode Duration 144    Episode Identifier 54    Episode Type Category Monitor    Episode Date Time [id number]    Episode Duration 93    Episode Identifier 53    Episode Type Category Monitor    Episode Date Time 31258532225249    Episode Duration 214    Episode Identifier 52    Episode Type Category Monitor    Episode Date Time 73217922521242    Episode Duration 309    Episode Identifier 51    Episode Type Category Monitor    Episode Date Time 12329558787523    Episode Duration 316    Episode Identifier 50    Episode Type Category Monitor    Episode Date Time 74574362864623    Episode Duration 180    Episode Identifier 49    Episode Type Category Monitor    Episode Date Time 05417377651425    Episode Duration 597    Episode Identifier 48    Episode Type Category Monitor    Episode Date Time 34191814011065    Episode Duration 102    Episode Identifier 47    Episode Type Category Monitor    Episode Date Time [id number]    Episode Duration 970    Episode Identifier 46    Episode Type Category Monitor    Episode  Date Time 34596164867868    Episode Duration 350    Episode Identifier 45    Episode Type Category Monitor    Episode Date Time 10331470386842    Episode Duration 351    Episode Identifier 44    Episode Type Category Monitor    Episode Date Time [id number]    Episode Duration 102    Episode Identifier 43    Episode Type Category Monitor    Episode Date Time 85493892138533    Episode Duration 353    Episode Identifier 42    Episode Type Category Monitor    Episode Date Time [id number]    Episode Duration 328    Episode Identifier 41    Episode Type Category Monitor    Episode Date Time [id number]    Episode Duration 152    Episode Identifier 40    Episode Type Category Monitor    Episode Date Time [id number]    Episode Duration 235    Episode Identifier 39    Episode Type Category Monitor    Episode Date Time 93336975954826    Episode Duration 296    Episode Identifier 38    Episode Type Category Monitor    Episode Date Time [id number]    Episode Duration 203    Episode Identifier 37    Episode Type Category Monitor    Episode Date Time [id number]    Episode Duration 182    Episode Identifier 36    Episode Type Category Monitor    Episode Date Time [id number]    Episode Duration 361    Episode Identifier 35    Episode Type Category Monitor    Episode Date Time [id number]    Episode Duration 297    Episode Identifier 34    Episode Type Category Monitor    Episode Date Time 29238509906833    Episode Duration 99    Episode Identifier 33    Episode Type Category Monitor    Episode Date Time 92892406731733    Episode Duration 228    Episode Identifier 32    Episode Type Category Monitor    Episode Date Time [id number]    Episode Duration 278    Episode Identifier 31    Episode Type Category Monitor    Episode Date Time [id number]    Episode Duration 186    Episode Identifier 30    Episode Type Category Monitor    Episode Date Time [id number]    Episode Duration 378    Episode  Identifier 29    Episode Type Category Monitor    Episode Date Time [id number]    Episode Duration 322    Narrative    Patient seen in OR #27 pre-operatively for evaluation and iterative programming of their ICD per MD orders.     Device: Medtronic [id number] Evera XT   Normal device function  Mode: AAI>DDD  bpm  AP: <0.1%  : <0.1%  Intrinsic rhythm:  bpm  Thoracic Impedance: Below the reference line suggesting possible intrathoracic fluid accumulation.  Short V-V intervals: 0  Lead Trends Appear Stable: yes  Estimated battery longevity to RRT = 5.2 years  Atrial Arrhythmia: 28 AT/AF episodes recorded - 1 min 1 sec - 16 min 10 sec.  AF [place]: 3.1%  Anticoagulant:   Ventricular Arrhythmia: 0  Setting Changes: VY detection, FVT detection and VT monitor zones programmed off.     TRINA Amador RN    Dual lead ICD    I have reviewed and interpreted the device interrogation, settings, programming and nurse's summary. The device is functioning within normal device parameters. I agree with the current findings, assessment and plan.   XR Chest Port 1 View    Narrative    EXAM: TEMPORARY  7/11/2022 5:37 PM     HISTORY:  Chest closure, intraoperative       COMPARISON:  7/11/2022    FINDINGS  Technique: Semiupright AP view of the chest.     Devices: Single view of the chest. The endotracheal tube tip projects  over the midthoracic trachea. Gastric tube passes below the left  hemidiaphragm. Stable LVAD and left chest wall cardiac device with  atrial and ventricular leads. Right upper extremity PICC tip is  obscured in the region of the high SVC. Stable positioning of right IJ  RVAD with tip in the pulmonary artery. Mediastinal drains and left  basilar chest tube. Chest is open with surgical sponges projected over  the mediastinum.    Unchanged cardiomegaly. Small bilateral pleural effusions. No  discernible pneumothorax.       Impression    IMPRESSION:   No unexpected retained foreign body. Remaining support  devices stable  in position.    Results reported to LITA Camp OR chelly 7/11/2022 1749 hours    I have personally reviewed the examination and initial interpretation  and I agree with the findings.    KASI EAST MD         SYSTEM ID:  [id number]   XR Abdomen Port 1 View    Narrative    XR ABDOMEN PORT 1 VIEWS  7/11/2022 6:56 PM      HISTORY: post chest closure and verification of lines    COMPARISON: 7/11/2022, 7/3/2022    FINDINGS:   Single AP view of the abdomen. Stable thoracic findings. Stable IVC  filter. Feeding tube tip overlying the proximal jejunum with residual  enteric contrast over the stomach. Relative paucity of bowel gas. No  definite pneumatosis or portal venous gas.      Impression    IMPRESSION:   Feeding tube tip overlying the proximal jejunum.    I have personally reviewed the examination and initial interpretation  and I agree with the findings.    KASI EAST MD         SYSTEM ID:  [id number]   XR Chest Port 1 View    Narrative    EXAM: XR CHEST PORT 1 VIEW  7/12/2022 1:15 AM     HISTORY:  RVAD/LVAD/ETT       COMPARISON:  7/11/2022    FINDINGS: Single view of the chest. Postsurgical changes of the chest  with median sternotomy wires. Endotracheal tube tip projects over the  mid thoracic trachea. Feeding tube courses below the diaphragm and  beyond the field-of-view. The tip is at least to the body of the  stomach. Right upper extremity PICC tip is obscured in the region of  mid SVC. Right IJ RVAD with tip over the distal main pulmonary trunk.  Stable left chest wall cardiac device and LVAD. Stable mediastinal  drains and left basilar chest tube.     Stable enlarged cardiac silhouette. Small bilateral pleural effusions.  No appreciable pneumothorax. Similar perihilar and bibasilar  opacities.      Impression    IMPRESSION:   1. Stable support devices.  2. Stable small pleural effusions and bilateral pulmonary opacities.    I have personally reviewed the examination and initial  interpretation  and I agree with the findings.    LAI HERNADEZ MD         SYSTEM ID:  [id number]   Cardiac Device Check - Inpatient   Result Value    Date Time Interrogation Session [id number]    Implantable Pulse Generator  Medtronic    Implantable Pulse Generator Model [id number] Evera XT DR    Implantable Pulse Generator Serial Number [id number]    Type Interrogation Session In Clinic    Clinic Name [facility]    Implantable Pulse Generator Type Defibrillator    Implantable Pulse Generator Implant Date 20180412    Implantable Lead  Medtronic    Implantable Lead Model 5076 CapSureFix Novus    Implantable Lead Serial Number [id number]    Implantable Lead Implant Date 20060306    Implantable Lead Polarity Type Bipolar Lead    Implantable Lead Location Detail 1 UNKNOWN    Implantable Lead Location Right Atrium    Implantable Lead  Medtronic    Implantable Lead Model 6949 Sprint Doron    Implantable Lead Serial Number [id number]    Implantable Lead Implant Date 20060306    Implantable Lead Polarity Type Bipolar Lead    Implantable Lead Location Detail 1 UNKNOWN    Implantable Lead Location Right Ventricle    Marcos Setting Mode (NBG Code) MVP_AAI_DDD    Marcos Setting Lower Rate Limit 50    Marcos Setting Maximum Tracking Rate 130    Marcos Setting Maximum Sensor Rate 115    Marcos Setting Hysterisis Rate DISABLED    Marcos Setting TRISTEN Delay Low 350    Marcos Setting PAV Delay Low 350    Marcos Setting AT Mode Switch Rate 171    Lead Channel Setting Sensing Polarity Bipolar    Lead Channel Setting Sensing Anode Location Right Atrium    Lead Channel Setting Sensing Anode Terminal Ring    Lead Channel Setting Sensing Cathode Location Right Atrium    Lead Channel Setting Sensing Cathode Terminal Tip    Lead Channel Setting Sensing Sensitivity 0.3    Lead Channel Setting Sensing Polarity Bipolar    Lead Channel Setting Sensing Anode Location Right Ventricle    Lead  Channel Setting Sensing Anode Terminal Ring    Lead Channel Setting Sensing Cathode Location Right Ventricle    Lead Channel Setting Sensing Cathode Terminal Tip    Lead Channel Setting Sensing Sensitivity 0.3    Lead Channel Setting Pacing Polarity Bipolar    Lead Channel Setting Pacing Anode Location Right Atrium    Lead Channel Setting Pacing Anode Terminal Ring    Lead Channel Setting Sensing Cathode Location Right Atrium    Lead Channel Setting Sensing Cathode Terminal Tip    Lead Channel Setting Pacing Pulse Width 0.4    Lead Channel Setting Pacing Amplitude 1.5    Lead Channel Setting Pacing Capture Mode Adaptive    Lead Channel Setting Pacing Polarity Bipolar    Lead Channel Setting Pacing Anode Location Right Ventricle    Lead Channel Setting Pacing Anode Terminal Ring    Lead Channel Setting Sensing Cathode Location Right Ventricle    Lead Channel Setting Sensing Cathode Terminal Tip    Lead Channel Setting Pacing Pulse Width 1    Lead Channel Setting Pacing Amplitude 5    Lead Channel Setting Pacing Capture Mode Adaptive    Zone Setting Type Category VF    Zone Setting Detection Interval 320    Zone Setting Detection Beats Numerator 30    Zone Setting Detection Beats Denominator 40    Zone Setting Type Category VT    Zone Setting Detection Interval 280    Zone Setting Type Category VT    Zone Setting Detection Interval 350    Zone Setting Type Category VT    Zone Setting Detection Interval 400    Zone Setting Type Category ATRIAL_FIBRILLATION    Zone Setting Type Category AT/AF    Zone Setting Detection Interval 350    Lead Channel Impedance Value 285    Lead Channel Sensing Intrinsic Amplitude 0.625    Lead Channel Pacing Threshold Amplitude 0.75    Lead Channel Pacing Threshold Pulse Width 0.4    Lead Channel Impedance Value 665    Lead Channel Impedance Value 551    Lead Channel Sensing Intrinsic Amplitude 0.75    Lead Channel Pacing Threshold Amplitude 2.75    Lead Channel Pacing Threshold Pulse Width  1    Battery Date Time of Measurements [id number]    Battery Status OK    Battery RRT Trigger 2.727    Battery Remaining Longevity 63    Battery Voltage 2.94    Capacitor Charge Type Reformation    Capacitor Last Charge Date Time 10889995726079    Capacitor Charge Time 3.973    Capacitor Charge Energy 18    Marcos Statistic Date Time Start 09348901000333    Marcos Statistic Date Time End 74723925404545    Marcos Statistic RA Percent Paced 0    Marcos Statistic RV Percent Paced 0    Marcos Statistic AP  Percent 0    Marcos Statistic AS  Percent 0    Marcos Statistic AP VS Percent 0    Marcos Statistic AS VS Percent 100    Atrial Tachy Statistic Date Time Start 61718328449746    Atrial Tachy Statistic Date Time End 09597743603244    Atrial Tachy Statistic AT/AF Scio Percent 0    Therapy Statistic Recent Shocks Delivered 0    Therapy Statistic Recent Shocks Aborted 0    Therapy Statistic Recent ATP Delivered 0    Therapy Statistic Recent Date Time Start 75628648293753    Therapy Statistic Recent Date Time End 09774273225327    Therapy Statistic Total Shocks Delivered 1    Therapy Statistic Total Shocks Aborted 0    Therapy Statistic Total ATP Delivered 0    Therapy Statistic Total  Date Time Start 51314653395037    Therapy Statistic Total  Date Time End 66067298660928    Episode Statistic Recent Count 0    Episode Statistic Type Category AT/AF    Episode Statistic Recent Count 0    Episode Statistic Type Category SVT    Episode Statistic Recent Count 0    Episode Statistic Type Category VT    Episode Statistic Recent Count 0    Episode Statistic Type Category VF    Episode Statistic Recent Count 0    Episode Statistic Type Category VT    Episode Statistic Recent Count 0    Episode Statistic Type Category VT    Episode Statistic Recent Count 0    Episode Statistic Type Category VT    Episode Statistic Recent Date Time Start 34169886090773    Episode Statistic Recent Date Time End 77686111390627    Episode Statistic  Recent Date Time Start 00606805124027    Episode Statistic Recent Date Time End 70450757288579    Episode Statistic Recent Date Time Start 01089231170395    Episode Statistic Recent Date Time End 53700726750846    Episode Statistic Recent Date Time Start 21118037315203    Episode Statistic Recent Date Time End 23113426543034    Episode Statistic Recent Date Time Start 66728051039837    Episode Statistic Recent Date Time End 64993703095418    Episode Statistic Recent Date Time Start 42109609118305    Episode Statistic Recent Date Time End 31261583453939    Episode Statistic Recent Date Time Start 25203251526253    Episode Statistic Recent Date Time End 19991677565399    Episode Statistic Total Count 33    Episode Statistic Type Category AT/AF    Episode Statistic Total Count 0    Episode Statistic Type Category SVT    Episode Statistic Total Count 9    Episode Statistic Type Category VT    Episode Statistic Total Count 1    Episode Statistic Type Category VF    Episode Statistic Total Count 0    Episode Statistic Type Category VT    Episode Statistic Total Count 0    Episode Statistic Type Category VT    Episode Statistic Total Count 1    Episode Statistic Type Category VT    Episode Statistic Total Date Time Start 99870322879646    Episode Statistic Total Date Time End 08553614082637    Episode Statistic Total Date Time Start 62828360665607    Episode Statistic Total Date Time End 58236017936604    Episode Statistic Total Date Time Start 04708203441398    Episode Statistic Total Date Time End 60894277293932    Episode Statistic Total Date Time Start 91571450194259    Episode Statistic Total Date Time End 26933577939303    Episode Statistic Total Date Time Start 34032206489289    Episode Statistic Total Date Time End 27158751069784    Episode Statistic Total Date Time Start 48236654797031    Episode Statistic Total Date Time End 51924333412030    Episode Statistic Total Date Time Start 77167628414349    Episode  Statistic Total Date Time End 42599833677675    Narrative    Patient seen on [facility] 4E for evaluation and iterative programming of their ICD per MD orders, post-op chest washout and closure.     Device: [employer] [id number] Evera CANDIDA GUTIERREZ  Normal device function. R Waves have drastically changed, today measuring 0.8 mV. RV threshold has also risen to 2.75 V @ 1 ms. RV impedance shows an increase in unipolar and unipolar.  Mode: AAI-DDD  bpm  AP: 0%  : 0%  Intrinsic Rhythm:  bpm  Short V-V intervals: 0  Lead Trends Appear Stable.   Estimated battery longevity to RRT = 5.2 years. Battery voltage = 2.95 V.   No Arrhythmias Recorded as Detection was ooff for surgery.  Setting Changes: ICD Tachy Detection and Therapies Turned ON.    Plan: Follow pt PRN while hospitalized  JESU Cueva RN    Dual lead ICD    I have reviewed and interpreted the device interrogation, settings, programming and nurse's summary. The device is functioning within normal device parameters. I agree with the current findings, assessment and plan.         6. LINES/DRAINS/AIRWAY    Peripheral IV 07/05/22 Left;Anterior Upper forearm (Active)   Site Assessment WDL 07/12/22 0800   Line Status Saline locked 07/12/22 0800   Dressing Intervention New dressing  07/12/22 0000   Phlebitis Scale 0-->no symptoms 07/12/22 0800   Infiltration Scale 0 07/12/22 0800   If infiltrated, was a vesicant infusing? No 07/11/22 1800   Number of days: 7       PICC Triple Lumen 06/17/22 Right Brachial vein medial (Active)   Site Assessment WDL 07/12/22 0800   Dressing Intervention Chlorhexidine patch;Transparent 07/12/22 0800   Dressing Change Due 07/17/22 07/12/22 0800   PICC Comment from OSH 06/17/22 1700   Osborne - Status infusing 07/12/22 0800   Osborne - Cap Change Due 07/15/22 07/12/22 0800   Red - Status infusing 07/12/22 0800   Red - Cap Change Due 07/15/22 07/12/22 0800   White - Status saline locked 07/12/22 0800   White - Cap Change Due 07/15/22 07/12/22 0800    Extravasation? No 07/12/22 0800   Line Necessity Yes, meets criteria 07/12/22 0800   Number of days: 25       Introducer 07/08/22 Femoral Left (Active)   Specific Qualities Capped 07/12/22 0800   (Retired) Dressing Status Clean, dry, intact 07/12/22 0800   Dressing Intervention Dressing changed 07/10/22 0000   Dressing Change Due 07/17/22 07/12/22 0800   Number of days: 4       Arterial Line 07/08/22 Femoral (Active)   Site Assessment WDL 07/12/22 0800   Line Status Pulsatile blood flow 07/12/22 0800   Arterine Line Cap Change Due 07/15/22 07/12/22 0800   Art Line Waveform Appropriate 07/12/22 0800   Art Line Interventions Leveled;Tubing changed;Connections checked and tightened 07/12/22 0800   Color/Movement/Sensation Capillary refill less than 3 sec 07/12/22 0800   Line Necessity Yes, meets criteria 07/12/22 0800   Dressing Type Transparent 07/12/22 0800   Dressing Status Clean, dry, intact 07/12/22 0800   Dressing Intervention Dressing changed/new dressing 07/10/22 0000   Dressing Change Due 07/17/22 07/12/22 0800   Number of days: 4       Arterial Line 07/08/22 Radial (Active)   Site Assessment WDL 07/12/22 0800   Line Status Pulsatile blood flow 07/12/22 0800   Arterine Line Cap Change Due 07/15/22 07/12/22 0800   Art Line Waveform Appropriate 07/12/22 0800   Art Line Interventions Leveled;Connections checked and tightened;Flushed per protocol 07/12/22 0800   Color/Movement/Sensation Capillary refill less than 3 sec 07/12/22 0800   Line Necessity Yes, meets criteria 07/12/22 0800   Dressing Type Transparent 07/12/22 0800   Dressing Status Clean, dry, intact 07/12/22 0800   Dressing Intervention Dressing changed/new dressing 07/12/22 0000   Dressing Change Due 07/17/22 07/12/22 0800   Number of days: 4       Venous Sheath 07/08/22 Other (comment) Right (Active)   Specific Qualities Sutured;Left in Place 07/12/22 0800   Site Assessment UTV 07/12/22 0800   Dressing Type Other (Comment) 07/12/22 0800   Dressing  Intervention Dressing changed/new dressing 07/08/22 1700   Venous Sheath Comment Protek Duo 07/12/22 0800   Number of days: 4       CVC Double Lumen Left Femoral (Active)   Site Assessment WDL 07/12/22 0800   Dressing Type Chlorhexidine disk;Transparent 07/12/22 0800   Dressing Status clean;dry;intact 07/12/22 0800   Dressing Intervention dressing changed 07/10/22 0000   Dressing Change Due 07/17/22 07/12/22 0800   Line Necessity yes, meets criteria 07/12/22 0800   Brown - Status saline locked 07/12/22 0800   Brown - Cap Change Due 07/15/22 07/12/22 0800   White - Status saline locked 07/12/22 0800   White - Cap Change Due 07/15/22 07/12/22 0800   Phlebitis Scale 0-->no symptoms 07/12/22 0800   Infiltration? no 07/12/22 0800   Infiltration Scale 0 07/12/22 0800   CVC Comment MAC 07/12/22 0800   Number of days: 4       ETT Cuffed Single 8 mm (Active)   Secured at (cm) 23 cm 07/12/22 0808   Measured from Teeth/Gums 07/12/22 0808   Position Center 07/12/22 0800   Secured by Commercial tube partida 07/12/22 0808   Bite Block None Present 07/12/22 0808   Site Appearance Clean;Dry 07/12/22 0808   Tube Care Site care done 07/11/22 1200   Cuff Assessment Minimal leak technique 07/12/22 0808   Safety Measures Manual resuscitator at bedside 07/12/22 0808   Number of days: 4       Chest Tube 1 Anterior Mediastinal 9 Pashto (Active)   Site Assessment UTV 07/12/22 0800   Suction -20 cm H2O 07/12/22 0800   Chest Tube Airleak No 07/12/22 0800   Drainage Description Sanguinous 07/12/22 0800   Dressing Status Normal: Clean, Dry & Intact 07/12/22 0800   Dressing Change Due 07/13/22 07/12/22 0800   Dressing Intervention Gauze 07/12/22 0800   Patency Intervention Milked;Tip/Tilt 07/12/22 0800   Chest Tube Clamps at Bedside present 07/12/22 0800   Container Amount 1100 07/12/22 1100   Output (ml) 20 ml 07/12/22 1100   Number of days: 4       Chest Tube 2 Anterior Mediastinal 9 Pashto (Active)   Site Assessment UTV 07/12/22 0800   Suction  -20 cm H2O 07/12/22 0800   Chest Tube Airleak No 07/12/22 0800   Drainage Description Sanguinous 07/12/22 0800   Dressing Status Normal: Clean, Dry & Intact 07/12/22 0800   Dressing Change Due 07/13/22 07/12/22 0800   Dressing Intervention Gauze 07/12/22 0800   Patency Intervention Milked;Tip/Tilt 07/12/22 0800   Chest Tube Clamps at Bedside present 07/12/22 0800   Number of days: 4       Chest Tube 3 Pleural 32 [place] (Active)   Site Assessment UTV 07/12/22 0800   Suction -20 cm H2O 07/12/22 0800   Chest Tube Airleak No 07/12/22 0800   Drainage Description Sanguinous 07/12/22 0800   Dressing Status Normal: Clean, Dry & Intact 07/12/22 0800   Dressing Change Due 07/13/22 07/12/22 0800   Dressing Intervention Gauze 07/12/22 0800   Patency Intervention Milked;Tip/Tilt 07/12/22 0800   Chest Tube Clamps at Bedside present 07/12/22 0800   Container Amount 580 07/12/22 1100   Output (ml) 30 ml 07/12/22 1100   Number of days: 4       Chest Tube 4 Posterior Pericardial 24 [place] (Active)   Site Assessment UTV 07/12/22 0800   Suction -20 cm H2O 07/12/22 0800   Chest Tube Airleak No 07/12/22 0800   Drainage Description Sanguinous 07/12/22 0800   Dressing Status Normal: Clean, Dry & Intact 07/12/22 0800   Dressing Change Due 07/13/22 07/12/22 0800   Dressing Intervention Gauze 07/12/22 0800   Patency Intervention Milked;Tip/Tilt 07/12/22 0800   Chest Tube Clamps at Bedside present 07/12/22 0800   Number of days: 4       Chest Tube 3 Anterior Mediastinal 28 [place] (Active)   Site Assessment UTV 07/12/22 0800   Suction -20 cm H2O 07/12/22 0800   Chest Tube Airleak No 07/12/22 0800   Drainage Description Sanguinous 07/12/22 0800   Dressing Status Normal: Clean, Dry & Intact 07/12/22 0800   Dressing Change Due 07/13/22 07/12/22 0800   Dressing Intervention Gauze 07/12/22 0800   Patency Intervention Milked;Tip/Tilt 07/12/22 0800   Chest Tube Clamps at Bedside present 07/12/22 0800   Container Amount 320 07/12/22 1100   Output (ml)  0 ml 07/12/22 1100   Number of days: 1       NG/OG/NJ Tube Nasojejunal Right nostril (Active)   Site Description WDL 07/12/22 0800   Status Enteral Feedings 07/12/22 0800   Placement Confirmation [place] unchanged 07/12/22 0800   [place] (cm marking) at nare/mouth 113 cm 07/12/22 0800   Intake (ml) 120 ml 07/12/22 0800   Flush/Free Water (mL) 30 mL 07/12/22 0800   Number of days: 1       Urethral Catheter 07/08/22 Coude;Latex;Temperature probe;Triple-lumen 16 fr (Active)   Tube Description Positional 07/12/22 0400   Catheter Care Done;Catheter wipes 07/12/22 0800   Collection Container Standard 07/12/22 0800   Securement Method Securing device (Describe) 07/12/22 0800   Rationale for Continued Use Strict 1-2 Hour I&O 07/12/22 0800   Urine Output 10 mL 07/12/22 1100   Number of days: 4       Incision/Surgical Site 06/23/22 Right Chest (Active)   Incision Assessment UTV 07/12/22 0800   Deirdre-Incision Assessment UTV 07/12/22 0800   Closure Liquid bandage;Approximated 07/12/22 0400   Incision Drainage Amount UTV 07/11/22 1200   Drainage Description UTV 07/11/22 1200   Incision Care Wound cleanser 07/11/22 0400   Dressing Intervention Clean, dry, intact 07/12/22 0800   Number of days: 19       Incision/Surgical Site 07/08/22 Midline Chest (Active)   Incision Assessment UTV 07/12/22 0800   Deirdre-Incision Assessment UTV 07/12/22 0800   Closure FABRICE 07/12/22 0400   Incision Drainage Amount Scant 07/11/22 1200   Drainage Description Sanguinous 07/11/22 0400   Incision Care Other (Comment) 07/10/22 0400   Dressing Intervention Clean, dry, intact 07/12/22 0800   Number of days: 4          ====================================================

## 2022-07-12 NOTE — PROGRESS NOTES
[facility]    Cardiology Progress Note- Cards 2        Date of Admission:  6/17/2022     Assessment & Plan: HVSL   Dandy EDUARD Sands is a 61 year old male with PMH of lupus, antiphospholipid syndrome, HTN, HLD, HFrEF 2/2 ICM who presented with cardiogenic shock c/b multiorgan failure (JOSE, shock liver) requiring mechanical support with IABP, now s/p HM3 LVAD 7/8/22 by Dr. Zamora with intraoperative course c/b RV failure s/p 29F Protek duo via RIJ    #HFrEF 2/2 ICM s/p HM3 LVAD in setting of cardiogenic shock (SCAI D)  #RV failure s/p Protek duo temporary RVAD  Patient with ICM s/p HM3 LVAD 7/8/22 by Dr. Zamora in setting of presentation for cardiogenic shock requiring MCS with IABP. Intraoperative course c/b RV failure resulting in cardiogenic shock requiring high dose pressors necessitating RVAD support. Pressors able to be weaned and overall end organ function appears intact at present time. Coagulopathy now corrected with transfusion support. Overall current plan of care to continue to target euvolemia with plan for washout and closure in OR today. Then will plan to proceed towards weaning his oxygen requirements and working towards extubation with wean of nitric prior. He currently appears to have rather minimal systemic volume which we will use diuretics as needed to maintain this. RVAD/LVAD appear to have balanced flows given appearance chest XR and no alarms for either circuit. Now s/p chest closure. Will work towards wean of Ashli today with plan for hopeful extubation within the next 24 hours. Will discuss with surgical team the start of heparin in addition to ASA today as well as the patient's antimicrobial regimen in setting of his now closed chest. Will continue to monitor his hemodynamic status closely to ensure RVAD/LVAD flows, particularly after chest closure to ensure no hemodynamic compromise  -wean Ashli today with assessment for extubation later  today pending his course  -continue LVAD at current speed  -will discuss AC, abx with surgical team  -increase hydralazine to 30mg Q8H for elevated MAPs to assist with flows (hold parameters for MAP<80 placed)      The patient's care was discussed with the Attending Physician, Dr. Askew, Bedside Nurse and Patient.    Emelyn Meneses MD  [facility]    ______________________________________________________________________    Interval History   Nursing notes reviewed. Chest washout and closure with RVAD oxygenator spliced out of circuit overnight. BRAXTON. Weaned Ashli slightly overnight. No issues with flows, RVAD/LVAD alarms. Sedation holiday this AM with movement of all 4 from patient.     Data reviewed today: I reviewed all medications, new labs and imaging results over the last 24 hours. I personally reviewed the chest x-ray image(s) showing stable lung appearance     Physical Exam   Vital Signs: Temp: 98.8  F (37.1  C) Temp src: Bladder  Pulse: 106   Resp: 12 SpO2: 97 % O2 Device: Mechanical Ventilator    Weight: 167 lbs 12.32 oz  General Appearance: Intubated, sedated male in ICU bed  Respiratory: ventilated lung sounds, clear anterior breath sounds  Cardiovascular: vad hum, driveline site c/d/i  GI: soft, bs active  Skin: warm, well perfused, minimal peripheral edema  Neuro: sedated, intubated, pupils equal, moves all 4 with sedation wean    Data   Recent Labs   Lab 07/12/22  0559 07/12/22  0355 07/12/22  0349 07/11/22  2347 07/11/22  2343 07/11/22  1936 07/11/22  1932 07/11/22  0518 07/11/22  0452 07/11/22  0400 07/11/22  0337 07/08/22  2225 07/08/22  2213 07/08/22  1644 07/08/22  1643   WBC  --   --  12.1*  --  11.2*  --  11.5*   < >  --    < >  --    < > 7.8   < > 14.1*   HGB  --   --  8.2*  --  8.1*  --  9.0*   < >  --    < >  --    < > 8.7*   < > 7.2*   MCV  --   --  89  --  89  --  88   < >  --    < >  --    < > 89   < > 89   PLT  --   --  94*  --  83*  --   73*   < >  --    < >  --    < > 114*   < > 163   INR  --   --  1.26*  --  1.27*  --  1.23*   < >  --   --   --    < > 1.45*   < > 1.87*   NA  --   --  134*  --  133*  --  135*   < > 135*  --   --    < > 137  --  141   POTASSIUM  --   --  4.5  --  4.3  --  4.2   < > 3.5  --   --    < > 4.4  --  3.9   CHLORIDE  --   --  106  --  105  --  105   < > 105  --   --    < > 106  --  107   CO2  --   --  20*  --  20*  --  20*   < > 20*  --   --    < > 22  --  20*   BUN  --   --  19.8  --  18.2  --  16.9   < > 14.8  --   --    < > 16.7  --  14.0   CR  --   --  0.66*  --  0.68  --  0.66*   < > 0.59*  --   --    < > 0.78  --  0.73   ANIONGAP  --   --  8  --  8  --  10   < > 10  --   --    < > 9  --  14   ELDA  --   --  7.8*  --  7.8*  --  8.1*   < > 8.0*  --   --    < > 8.3*  --  8.2*   * 164* 153*   < > 102*   < > 99   < > 123*   < >  --    < > 101*   < > 108*   ALBUMIN  --   --  2.3*  --   --   --   --   --   --   --  0.6*   < > 2.5*  --  2.3*   PROTTOTAL  --   --   --   --   --   --   --   --   --   --   --   --  4.4*  --  3.8*   BILITOTAL  --   --   --   --   --   --   --   --   --   --   --   --  1.0  --  1.0   ALKPHOS  --   --   --   --   --   --   --   --   --   --   --   --  59  --  51   ALT  --   --  21  --   --   --   --   --  19  --   --    < > 29  --  25   AST  --   --   --   --   --   --   --   --   --   --   --   --  159*  --  134*    < > = values in this interval not displayed.       Medications     BETA BLOCKER NOT PRESCRIBED       dextrose       fentaNYL 150 mcg/hr (07/12/22 0700)     heparin Stopped (07/11/22 1634)     insulin regular Stopped (07/10/22 1200)     Patient RECEIVING antibiotic to treat a different condition and it provides ADEQUATE COVERAGE for this surgical procedure.       propofol (DIPRIVAN) infusion 50 mcg/kg/min (07/12/22 0700)       acetaminophen  975 mg Oral or Feeding Tube Q8H CAMMY     ceFEPIme (MAXIPIME) IV  2 g Intravenous Q8H     DULoxetine  30 mg Oral Daily      fluticasone-salmeterol  2 puff Inhalation BID     hydrALAZINE  20 mg Oral Q8H CAMMY     multivitamins w/minerals  15 mL Per Feeding Tube Daily     mupirocin  1 g Both Nostrils BID     pantoprazole  40 mg Oral or NG Tube Daily    Or     pantoprazole  40 mg Oral Daily     polyethylene glycol  17 g Oral or Feeding Tube Daily     protein modular  1 packet Per Feeding Tube TID     senna-docusate  1 tablet Oral or Feeding Tube BID     sodium chloride (PF)  3 mL Intracatheter Q8H     sodium chloride (PF)  3 mL Intracatheter Q8H     vancomycin  1,250 mg Intravenous Q12H

## 2022-07-12 NOTE — PLAN OF CARE
Major Shift Events:  Pt remains intubated with LVAD and centrimag RVAD. Weaned Ashli off. LVAD flow 3-3.3, RVAD flow ~3.4. UOP 10-20cc/hr. Chest tube output ~50-70/hr between all of them. Pleural putting out the most. 1u PRBC given earlier for drifting hgb. Pt having dark red/brown emesis x2. Teams notified. OG placed, drained 100cc of brown output. Pt's lactic marina to 4.2 this evening, teams aware. Giving 2 more units of PRBCs and getting a CT of chest/pelvis/abd. Pt pressure supported for a couple hours today and tolerated it well. Precedex started this afternoon in hopes of being able to come off propofol. Fent running for pain management. Pt's son bedside this afternoon and was updated by the team.     Plan: Continue to monitor and update the team as needed. Follow lactics and hgb to monitor for bleeding    For vital signs and complete assessments, please see documentation flowsheets.

## 2022-07-12 NOTE — PROGRESS NOTES
RVAD note 3-7p:     Pt transported to CT for scan of chest, abdomen and pelvis with contrast. Pt tolerated well. 2nd unit of prbc's being given this shift with a 3rd unit of prbc's to follow.

## 2022-07-12 NOTE — PROGRESS NOTES
Status Change-OS 07/12/22    Changed patient's listing status in UNOS from status 2 to status 7 for the following reasons:  VAD placement, temporarily too ill.     Patient aware of change, primary cardiology team aware.

## 2022-07-13 ENCOUNTER — ANESTHESIA EVENT (OUTPATIENT)
Dept: SURGERY | Facility: CLINIC | Age: 62
DRG: 001 | End: 2022-07-13
Payer: COMMERCIAL

## 2022-07-13 ENCOUNTER — APPOINTMENT (OUTPATIENT)
Dept: GENERAL RADIOLOGY | Facility: CLINIC | Age: 62
DRG: 001 | End: 2022-07-13
Attending: SURGERY
Payer: COMMERCIAL

## 2022-07-13 ENCOUNTER — APPOINTMENT (OUTPATIENT)
Dept: GENERAL RADIOLOGY | Facility: CLINIC | Age: 62
DRG: 001 | End: 2022-07-13
Attending: THORACIC SURGERY (CARDIOTHORACIC VASCULAR SURGERY)
Payer: COMMERCIAL

## 2022-07-13 ENCOUNTER — ANESTHESIA (OUTPATIENT)
Dept: SURGERY | Facility: CLINIC | Age: 62
DRG: 001 | End: 2022-07-13
Payer: COMMERCIAL

## 2022-07-13 ENCOUNTER — ANCILLARY PROCEDURE (OUTPATIENT)
Dept: CARDIOLOGY | Facility: CLINIC | Age: 62
DRG: 001 | End: 2022-07-13
Attending: SURGERY
Payer: COMMERCIAL

## 2022-07-13 LAB
ACT BLD: 160 SECONDS (ref 74–150)
ACT BLD: 173 SECONDS (ref 74–150)
ACT BLD: 182 SECONDS (ref 74–150)
ALBUMIN SERPL BCG-MCNC: 2.9 G/DL (ref 3.5–5.2)
ALT SERPL W P-5'-P-CCNC: 25 U/L (ref 10–50)
ANION GAP SERPL CALCULATED.3IONS-SCNC: 10 MMOL/L (ref 7–15)
ANION GAP SERPL CALCULATED.3IONS-SCNC: 11 MMOL/L (ref 7–15)
ANION GAP SERPL CALCULATED.3IONS-SCNC: 12 MMOL/L (ref 7–15)
ANION GAP SERPL CALCULATED.3IONS-SCNC: 12 MMOL/L (ref 7–15)
ANION GAP SERPL CALCULATED.3IONS-SCNC: 9 MMOL/L (ref 7–15)
APTT PPP: 42 SECONDS (ref 22–38)
APTT PPP: 45 SECONDS (ref 22–38)
APTT PPP: 47 SECONDS (ref 22–38)
APTT PPP: 55 SECONDS (ref 22–38)
APTT PPP: 58 SECONDS (ref 22–38)
AT III ACT/NOR PPP CHRO: 92 % (ref 85–135)
ATRIAL RATE - MUSE: 32 BPM
BACTERIA UR CULT: NO GROWTH
BASE EXCESS BLDA CALC-SCNC: -3.7 MMOL/L (ref -9–1.8)
BASE EXCESS BLDA CALC-SCNC: -3.9 MMOL/L (ref -9–1.8)
BASE EXCESS BLDA CALC-SCNC: -4.1 MMOL/L (ref -9–1.8)
BASE EXCESS BLDA CALC-SCNC: -4.9 MMOL/L (ref -9–1.8)
BASE EXCESS BLDA CALC-SCNC: -5.1 MMOL/L (ref -9–1.8)
BASE EXCESS BLDA CALC-SCNC: -5.5 MMOL/L (ref -9.6–2)
BASE EXCESS BLDA CALC-SCNC: -5.6 MMOL/L (ref -9–1.8)
BASE EXCESS BLDA CALC-SCNC: -5.7 MMOL/L (ref -9–1.8)
BASE EXCESS BLDA CALC-SCNC: -6.7 MMOL/L (ref -9.6–2)
BASOPHILS # BLD MANUAL: 0 10E3/UL (ref 0–0.2)
BASOPHILS # BLD MANUAL: 0.1 10E3/UL (ref 0–0.2)
BASOPHILS # BLD MANUAL: 0.2 10E3/UL (ref 0–0.2)
BASOPHILS # BLD MANUAL: 0.2 10E3/UL (ref 0–0.2)
BASOPHILS # BLD MANUAL: 0.3 10E3/UL (ref 0–0.2)
BASOPHILS NFR BLD MANUAL: 0 %
BASOPHILS NFR BLD MANUAL: 1 %
BASOPHILS NFR BLD MANUAL: 2 %
BLD PROD TYP BPU: NORMAL
BLOOD COMPONENT TYPE: NORMAL
BUN SERPL-MCNC: 28.5 MG/DL (ref 8–23)
BUN SERPL-MCNC: 29.4 MG/DL (ref 8–23)
BUN SERPL-MCNC: 30.7 MG/DL (ref 8–23)
BUN SERPL-MCNC: 31.2 MG/DL (ref 8–23)
BUN SERPL-MCNC: 32.3 MG/DL (ref 8–23)
BURR CELLS BLD QL SMEAR: SLIGHT
BURR CELLS BLD QL SMEAR: SLIGHT
CA-I BLD-MCNC: 4.4 MG/DL (ref 4.4–5.2)
CA-I BLD-MCNC: 4.4 MG/DL (ref 4.4–5.2)
CA-I BLD-MCNC: 4.5 MG/DL (ref 4.4–5.2)
CA-I BLD-MCNC: 4.6 MG/DL (ref 4.4–5.2)
CALCIUM SERPL-MCNC: 7.8 MG/DL (ref 8.8–10.2)
CALCIUM SERPL-MCNC: 7.9 MG/DL (ref 8.8–10.2)
CALCIUM SERPL-MCNC: 7.9 MG/DL (ref 8.8–10.2)
CALCIUM SERPL-MCNC: 8 MG/DL (ref 8.8–10.2)
CALCIUM SERPL-MCNC: 8.4 MG/DL (ref 8.8–10.2)
CF REDUC 30M P MA P HEP LENFR BLD TEG: 0 % (ref 0–8)
CF REDUC 60M P MA P HEPASE LENFR BLD TEG: 0 % (ref 0–15)
CFT P HPASE BLD TEG: 1.8 MINUTE (ref 1–3)
CHLORIDE SERPL-SCNC: 104 MMOL/L (ref 98–107)
CHLORIDE SERPL-SCNC: 104 MMOL/L (ref 98–107)
CHLORIDE SERPL-SCNC: 106 MMOL/L (ref 98–107)
CHLORIDE SERPL-SCNC: 107 MMOL/L (ref 98–107)
CHLORIDE SERPL-SCNC: 107 MMOL/L (ref 98–107)
CI (COAGULATION INDEX)(Z): -1.9 (ref -3–3)
CLOT ANGLE P HPASE BLD TEG: 64.2 DEGREES (ref 53–72)
CLOT INIT P HPASE BLD TEG: 8.8 MINUTE (ref 5–10)
CLOT STRENGTH P HPASE BLD TEG: 7.8 KD/SC (ref 4.5–11)
CODING SYSTEM: NORMAL
CREAT SERPL-MCNC: 0.7 MG/DL (ref 0.67–1.17)
CREAT SERPL-MCNC: 0.75 MG/DL (ref 0.67–1.17)
CREAT SERPL-MCNC: 0.89 MG/DL (ref 0.67–1.17)
CROSSMATCH: NORMAL
D DIMER PPP FEU-MCNC: 2.22 UG/ML FEU (ref 0–0.5)
DEPRECATED HCO3 PLAS-SCNC: 18 MMOL/L (ref 22–29)
DEPRECATED HCO3 PLAS-SCNC: 19 MMOL/L (ref 22–29)
DEPRECATED HCO3 PLAS-SCNC: 20 MMOL/L (ref 22–29)
DIASTOLIC BLOOD PRESSURE - MUSE: NORMAL MMHG
ELLIPTOCYTES BLD QL SMEAR: SLIGHT
EOSINOPHIL # BLD MANUAL: 0 10E3/UL (ref 0–0.7)
EOSINOPHIL # BLD MANUAL: 0.1 10E3/UL (ref 0–0.7)
EOSINOPHIL # BLD MANUAL: 0.4 10E3/UL (ref 0–0.7)
EOSINOPHIL # BLD MANUAL: 0.5 10E3/UL (ref 0–0.7)
EOSINOPHIL # BLD MANUAL: 0.5 10E3/UL (ref 0–0.7)
EOSINOPHIL NFR BLD MANUAL: 0 %
EOSINOPHIL NFR BLD MANUAL: 1 %
EOSINOPHIL NFR BLD MANUAL: 2 %
EOSINOPHIL NFR BLD MANUAL: 3 %
EOSINOPHIL NFR BLD MANUAL: 4 %
ERYTHROCYTE [DISTWIDTH] IN BLOOD BY AUTOMATED COUNT: 17 % (ref 10–15)
ERYTHROCYTE [DISTWIDTH] IN BLOOD BY AUTOMATED COUNT: 17.2 % (ref 10–15)
ERYTHROCYTE [DISTWIDTH] IN BLOOD BY AUTOMATED COUNT: 17.8 % (ref 10–15)
ERYTHROCYTE [DISTWIDTH] IN BLOOD BY AUTOMATED COUNT: 18 % (ref 10–15)
ERYTHROCYTE [DISTWIDTH] IN BLOOD BY AUTOMATED COUNT: 18.2 % (ref 10–15)
FIBRINOGEN PPP-MCNC: 399 MG/DL (ref 170–490)
GFR SERPL CREATININE-BSD FRML MDRD: >90 ML/MIN/1.73M2
GLUCOSE BLD-MCNC: 68 MG/DL (ref 70–99)
GLUCOSE BLD-MCNC: 78 MG/DL (ref 70–99)
GLUCOSE BLDC GLUCOMTR-MCNC: 121 MG/DL (ref 70–99)
GLUCOSE BLDC GLUCOMTR-MCNC: 137 MG/DL (ref 70–99)
GLUCOSE BLDC GLUCOMTR-MCNC: 137 MG/DL (ref 70–99)
GLUCOSE BLDC GLUCOMTR-MCNC: 139 MG/DL (ref 70–99)
GLUCOSE BLDC GLUCOMTR-MCNC: 139 MG/DL (ref 70–99)
GLUCOSE BLDC GLUCOMTR-MCNC: 150 MG/DL (ref 70–99)
GLUCOSE BLDC GLUCOMTR-MCNC: 164 MG/DL (ref 70–99)
GLUCOSE BLDC GLUCOMTR-MCNC: 170 MG/DL (ref 70–99)
GLUCOSE BLDC GLUCOMTR-MCNC: 175 MG/DL (ref 70–99)
GLUCOSE BLDC GLUCOMTR-MCNC: 178 MG/DL (ref 70–99)
GLUCOSE BLDC GLUCOMTR-MCNC: 184 MG/DL (ref 70–99)
GLUCOSE BLDC GLUCOMTR-MCNC: 187 MG/DL (ref 70–99)
GLUCOSE BLDC GLUCOMTR-MCNC: 191 MG/DL (ref 70–99)
GLUCOSE BLDC GLUCOMTR-MCNC: 88 MG/DL (ref 70–99)
GLUCOSE SERPL-MCNC: 112 MG/DL (ref 70–99)
GLUCOSE SERPL-MCNC: 171 MG/DL (ref 70–99)
GLUCOSE SERPL-MCNC: 185 MG/DL (ref 70–99)
GLUCOSE SERPL-MCNC: 197 MG/DL (ref 70–99)
GLUCOSE SERPL-MCNC: 94 MG/DL (ref 70–99)
HCO3 BLD-SCNC: 20 MMOL/L (ref 21–28)
HCO3 BLD-SCNC: 21 MMOL/L (ref 21–28)
HCO3 BLDA-SCNC: 19 MMOL/L (ref 21–28)
HCO3 BLDA-SCNC: 20 MMOL/L (ref 21–28)
HCT VFR BLD AUTO: 27.4 % (ref 40–53)
HCT VFR BLD AUTO: 28.4 % (ref 40–53)
HCT VFR BLD AUTO: 28.8 % (ref 40–53)
HCT VFR BLD AUTO: 29.3 % (ref 40–53)
HCT VFR BLD AUTO: 30.8 % (ref 40–53)
HGB BLD-MCNC: 10.1 G/DL (ref 13.3–17.7)
HGB BLD-MCNC: 9.1 G/DL (ref 13.3–17.7)
HGB BLD-MCNC: 9.4 G/DL (ref 13.3–17.7)
HGB BLD-MCNC: 9.5 G/DL (ref 13.3–17.7)
HGB BLD-MCNC: 9.8 G/DL (ref 13.3–17.7)
HGB BLD-MCNC: 9.8 G/DL (ref 13.3–17.7)
HGB BLD-MCNC: 9.9 G/DL (ref 13.3–17.7)
HGB FREE PLAS-MCNC: 90 MG/DL
INR PPP: 1.13 (ref 0.85–1.15)
INR PPP: 1.18 (ref 0.85–1.15)
INR PPP: 1.19 (ref 0.85–1.15)
INR PPP: 1.22 (ref 0.85–1.15)
INTERPRETATION ECG - MUSE: NORMAL
ISSUE DATE AND TIME: NORMAL
LACTATE BLD-SCNC: 0.7 MMOL/L
LACTATE BLD-SCNC: 1 MMOL/L
LACTATE SERPL-SCNC: 1.5 MMOL/L (ref 0.7–2)
LACTATE SERPL-SCNC: 1.6 MMOL/L (ref 0.7–2)
LACTATE SERPL-SCNC: 1.7 MMOL/L (ref 0.7–2)
LACTATE SERPL-SCNC: 1.8 MMOL/L (ref 0.7–2)
LYMPHOCYTES # BLD MANUAL: 0.5 10E3/UL (ref 0.8–5.3)
LYMPHOCYTES # BLD MANUAL: 0.6 10E3/UL (ref 0.8–5.3)
LYMPHOCYTES # BLD MANUAL: 0.6 10E3/UL (ref 0.8–5.3)
LYMPHOCYTES # BLD MANUAL: 0.8 10E3/UL (ref 0.8–5.3)
LYMPHOCYTES # BLD MANUAL: 1.5 10E3/UL (ref 0.8–5.3)
LYMPHOCYTES NFR BLD MANUAL: 3 %
LYMPHOCYTES NFR BLD MANUAL: 4 %
LYMPHOCYTES NFR BLD MANUAL: 4 %
LYMPHOCYTES NFR BLD MANUAL: 8 %
LYMPHOCYTES NFR BLD MANUAL: 9 %
MAGNESIUM SERPL-MCNC: 2.2 MG/DL (ref 1.7–2.3)
MAGNESIUM SERPL-MCNC: 2.2 MG/DL (ref 1.7–2.3)
MCF P HPASE BLD TEG: 60.9 MM (ref 50–70)
MCH RBC QN AUTO: 28.7 PG (ref 26.5–33)
MCH RBC QN AUTO: 28.8 PG (ref 26.5–33)
MCH RBC QN AUTO: 28.9 PG (ref 26.5–33)
MCHC RBC AUTO-ENTMCNC: 32.8 G/DL (ref 31.5–36.5)
MCHC RBC AUTO-ENTMCNC: 33 G/DL (ref 31.5–36.5)
MCHC RBC AUTO-ENTMCNC: 33.1 G/DL (ref 31.5–36.5)
MCHC RBC AUTO-ENTMCNC: 33.2 G/DL (ref 31.5–36.5)
MCHC RBC AUTO-ENTMCNC: 33.4 G/DL (ref 31.5–36.5)
MCV RBC AUTO: 86 FL (ref 78–100)
MCV RBC AUTO: 86 FL (ref 78–100)
MCV RBC AUTO: 87 FL (ref 78–100)
MCV RBC AUTO: 88 FL (ref 78–100)
MCV RBC AUTO: 88 FL (ref 78–100)
MDC_IDC_LEAD_IMPLANT_DT: NORMAL
MDC_IDC_LEAD_IMPLANT_DT: NORMAL
MDC_IDC_LEAD_LOCATION: NORMAL
MDC_IDC_LEAD_LOCATION: NORMAL
MDC_IDC_LEAD_LOCATION_DETAIL_1: NORMAL
MDC_IDC_LEAD_LOCATION_DETAIL_1: NORMAL
MDC_IDC_LEAD_MFG: NORMAL
MDC_IDC_LEAD_MFG: NORMAL
MDC_IDC_LEAD_MODEL: NORMAL
MDC_IDC_LEAD_MODEL: NORMAL
MDC_IDC_LEAD_POLARITY_TYPE: NORMAL
MDC_IDC_LEAD_POLARITY_TYPE: NORMAL
MDC_IDC_LEAD_SERIAL: NORMAL
MDC_IDC_LEAD_SERIAL: NORMAL
MDC_IDC_MSMT_BATTERY_DTM: NORMAL
MDC_IDC_MSMT_BATTERY_REMAINING_LONGEVITY: 63 MO
MDC_IDC_MSMT_BATTERY_RRT_TRIGGER: 2.73
MDC_IDC_MSMT_BATTERY_STATUS: NORMAL
MDC_IDC_MSMT_BATTERY_VOLTAGE: 2.91 V
MDC_IDC_MSMT_CAP_CHARGE_DTM: NORMAL
MDC_IDC_MSMT_CAP_CHARGE_ENERGY: 18 J
MDC_IDC_MSMT_CAP_CHARGE_TIME: 3.97
MDC_IDC_MSMT_CAP_CHARGE_TYPE: NORMAL
MDC_IDC_MSMT_LEADCHNL_RA_IMPEDANCE_VALUE: 285 OHM
MDC_IDC_MSMT_LEADCHNL_RA_PACING_THRESHOLD_AMPLITUDE: 0.75 V
MDC_IDC_MSMT_LEADCHNL_RA_PACING_THRESHOLD_PULSEWIDTH: 0.4 MS
MDC_IDC_MSMT_LEADCHNL_RA_SENSING_INTR_AMPL: 0.4 MV
MDC_IDC_MSMT_LEADCHNL_RV_IMPEDANCE_VALUE: 646 OHM
MDC_IDC_MSMT_LEADCHNL_RV_IMPEDANCE_VALUE: 722 OHM
MDC_IDC_MSMT_LEADCHNL_RV_PACING_THRESHOLD_AMPLITUDE: 3 V
MDC_IDC_MSMT_LEADCHNL_RV_PACING_THRESHOLD_PULSEWIDTH: 1 MS
MDC_IDC_MSMT_LEADCHNL_RV_SENSING_INTR_AMPL: 1 MV
MDC_IDC_PG_IMPLANT_DTM: NORMAL
MDC_IDC_PG_MFG: NORMAL
MDC_IDC_PG_MODEL: NORMAL
MDC_IDC_PG_SERIAL: NORMAL
MDC_IDC_PG_TYPE: NORMAL
MDC_IDC_SESS_CLINIC_NAME: NORMAL
MDC_IDC_SESS_DTM: NORMAL
MDC_IDC_SESS_TYPE: NORMAL
MDC_IDC_SET_BRADY_AT_MODE_SWITCH_RATE: 171 {BEATS}/MIN
MDC_IDC_SET_BRADY_HYSTRATE: NORMAL
MDC_IDC_SET_BRADY_LOWRATE: 50 {BEATS}/MIN
MDC_IDC_SET_BRADY_MAX_SENSOR_RATE: 115 {BEATS}/MIN
MDC_IDC_SET_BRADY_MAX_TRACKING_RATE: 130 {BEATS}/MIN
MDC_IDC_SET_BRADY_MODE: NORMAL
MDC_IDC_SET_BRADY_PAV_DELAY_LOW: 350 MS
MDC_IDC_SET_BRADY_SAV_DELAY_LOW: 350 MS
MDC_IDC_SET_LEADCHNL_RA_PACING_AMPLITUDE: 2 V
MDC_IDC_SET_LEADCHNL_RA_PACING_ANODE_ELECTRODE_1: NORMAL
MDC_IDC_SET_LEADCHNL_RA_PACING_ANODE_LOCATION_1: NORMAL
MDC_IDC_SET_LEADCHNL_RA_PACING_CAPTURE_MODE: NORMAL
MDC_IDC_SET_LEADCHNL_RA_PACING_CATHODE_ELECTRODE_1: NORMAL
MDC_IDC_SET_LEADCHNL_RA_PACING_CATHODE_LOCATION_1: NORMAL
MDC_IDC_SET_LEADCHNL_RA_PACING_POLARITY: NORMAL
MDC_IDC_SET_LEADCHNL_RA_PACING_PULSEWIDTH: 0.4 MS
MDC_IDC_SET_LEADCHNL_RA_SENSING_ANODE_ELECTRODE_1: NORMAL
MDC_IDC_SET_LEADCHNL_RA_SENSING_ANODE_LOCATION_1: NORMAL
MDC_IDC_SET_LEADCHNL_RA_SENSING_CATHODE_ELECTRODE_1: NORMAL
MDC_IDC_SET_LEADCHNL_RA_SENSING_CATHODE_LOCATION_1: NORMAL
MDC_IDC_SET_LEADCHNL_RA_SENSING_POLARITY: NORMAL
MDC_IDC_SET_LEADCHNL_RA_SENSING_SENSITIVITY: 0.3 MV
MDC_IDC_SET_LEADCHNL_RV_PACING_AMPLITUDE: 5 V
MDC_IDC_SET_LEADCHNL_RV_PACING_ANODE_ELECTRODE_1: NORMAL
MDC_IDC_SET_LEADCHNL_RV_PACING_ANODE_LOCATION_1: NORMAL
MDC_IDC_SET_LEADCHNL_RV_PACING_CAPTURE_MODE: NORMAL
MDC_IDC_SET_LEADCHNL_RV_PACING_CATHODE_ELECTRODE_1: NORMAL
MDC_IDC_SET_LEADCHNL_RV_PACING_CATHODE_LOCATION_1: NORMAL
MDC_IDC_SET_LEADCHNL_RV_PACING_POLARITY: NORMAL
MDC_IDC_SET_LEADCHNL_RV_PACING_PULSEWIDTH: 1 MS
MDC_IDC_SET_LEADCHNL_RV_SENSING_ANODE_ELECTRODE_1: NORMAL
MDC_IDC_SET_LEADCHNL_RV_SENSING_ANODE_LOCATION_1: NORMAL
MDC_IDC_SET_LEADCHNL_RV_SENSING_CATHODE_ELECTRODE_1: NORMAL
MDC_IDC_SET_LEADCHNL_RV_SENSING_CATHODE_LOCATION_1: NORMAL
MDC_IDC_SET_LEADCHNL_RV_SENSING_POLARITY: NORMAL
MDC_IDC_SET_LEADCHNL_RV_SENSING_SENSITIVITY: 0.3 MV
MDC_IDC_SET_ZONE_DETECTION_BEATS_DENOMINATOR: 40 {BEATS}
MDC_IDC_SET_ZONE_DETECTION_BEATS_NUMERATOR: 30 {BEATS}
MDC_IDC_SET_ZONE_DETECTION_INTERVAL: 280 MS
MDC_IDC_SET_ZONE_DETECTION_INTERVAL: 320 MS
MDC_IDC_SET_ZONE_DETECTION_INTERVAL: 350 MS
MDC_IDC_SET_ZONE_DETECTION_INTERVAL: 350 MS
MDC_IDC_SET_ZONE_DETECTION_INTERVAL: 400 MS
MDC_IDC_SET_ZONE_TYPE: NORMAL
MDC_IDC_STAT_AT_BURDEN_PERCENT: 0 %
MDC_IDC_STAT_AT_DTM_END: NORMAL
MDC_IDC_STAT_AT_DTM_START: NORMAL
MDC_IDC_STAT_BRADY_AP_VP_PERCENT: 18.49 %
MDC_IDC_STAT_BRADY_AP_VS_PERCENT: 0.05 %
MDC_IDC_STAT_BRADY_AS_VP_PERCENT: 0.19 %
MDC_IDC_STAT_BRADY_AS_VS_PERCENT: 81.27 %
MDC_IDC_STAT_BRADY_DTM_END: NORMAL
MDC_IDC_STAT_BRADY_DTM_START: NORMAL
MDC_IDC_STAT_BRADY_RA_PERCENT_PACED: 18.52 %
MDC_IDC_STAT_BRADY_RV_PERCENT_PACED: 18.6 %
MDC_IDC_STAT_EPISODE_RECENT_COUNT: 0
MDC_IDC_STAT_EPISODE_RECENT_COUNT_DTM_END: NORMAL
MDC_IDC_STAT_EPISODE_RECENT_COUNT_DTM_START: NORMAL
MDC_IDC_STAT_EPISODE_TOTAL_COUNT: 0
MDC_IDC_STAT_EPISODE_TOTAL_COUNT: 1
MDC_IDC_STAT_EPISODE_TOTAL_COUNT: 1
MDC_IDC_STAT_EPISODE_TOTAL_COUNT: 33
MDC_IDC_STAT_EPISODE_TOTAL_COUNT: 9
MDC_IDC_STAT_EPISODE_TOTAL_COUNT_DTM_END: NORMAL
MDC_IDC_STAT_EPISODE_TOTAL_COUNT_DTM_START: NORMAL
MDC_IDC_STAT_EPISODE_TYPE: NORMAL
MDC_IDC_STAT_TACHYTHERAPY_ATP_DELIVERED_RECENT: 0
MDC_IDC_STAT_TACHYTHERAPY_ATP_DELIVERED_TOTAL: 0
MDC_IDC_STAT_TACHYTHERAPY_RECENT_DTM_END: NORMAL
MDC_IDC_STAT_TACHYTHERAPY_RECENT_DTM_START: NORMAL
MDC_IDC_STAT_TACHYTHERAPY_SHOCKS_ABORTED_RECENT: 0
MDC_IDC_STAT_TACHYTHERAPY_SHOCKS_ABORTED_TOTAL: 0
MDC_IDC_STAT_TACHYTHERAPY_SHOCKS_DELIVERED_RECENT: 0
MDC_IDC_STAT_TACHYTHERAPY_SHOCKS_DELIVERED_TOTAL: 1
MDC_IDC_STAT_TACHYTHERAPY_TOTAL_DTM_END: NORMAL
MDC_IDC_STAT_TACHYTHERAPY_TOTAL_DTM_START: NORMAL
METAMYELOCYTES # BLD MANUAL: 0.1 10E3/UL
METAMYELOCYTES # BLD MANUAL: 0.2 10E3/UL
METAMYELOCYTES NFR BLD MANUAL: 1 %
METAMYELOCYTES NFR BLD MANUAL: 1 %
MONOCYTES # BLD MANUAL: 0.3 10E3/UL (ref 0–1.3)
MONOCYTES # BLD MANUAL: 0.5 10E3/UL (ref 0–1.3)
MONOCYTES # BLD MANUAL: 0.6 10E3/UL (ref 0–1.3)
MONOCYTES # BLD MANUAL: 1.1 10E3/UL (ref 0–1.3)
MONOCYTES # BLD MANUAL: 3.5 10E3/UL (ref 0–1.3)
MONOCYTES NFR BLD MANUAL: 18 %
MONOCYTES NFR BLD MANUAL: 3 %
MONOCYTES NFR BLD MANUAL: 4 %
MONOCYTES NFR BLD MANUAL: 5 %
MONOCYTES NFR BLD MANUAL: 7 %
MYELOCYTES # BLD MANUAL: 0.2 10E3/UL
MYELOCYTES # BLD MANUAL: 0.3 10E3/UL
MYELOCYTES # BLD MANUAL: 0.6 10E3/UL
MYELOCYTES NFR BLD MANUAL: 2 %
MYELOCYTES NFR BLD MANUAL: 2 %
MYELOCYTES NFR BLD MANUAL: 3 %
NEUTROPHILS # BLD MANUAL: 10 10E3/UL (ref 1.6–8.3)
NEUTROPHILS # BLD MANUAL: 12.4 10E3/UL (ref 1.6–8.3)
NEUTROPHILS # BLD MANUAL: 13 10E3/UL (ref 1.6–8.3)
NEUTROPHILS # BLD MANUAL: 14 10E3/UL (ref 1.6–8.3)
NEUTROPHILS # BLD MANUAL: 8.4 10E3/UL (ref 1.6–8.3)
NEUTROPHILS NFR BLD MANUAL: 72 %
NEUTROPHILS NFR BLD MANUAL: 80 %
NEUTROPHILS NFR BLD MANUAL: 85 %
NEUTROPHILS NFR BLD MANUAL: 87 %
NEUTROPHILS NFR BLD MANUAL: 87 %
NRBC # BLD AUTO: 0.1 10E3/UL
NRBC # BLD AUTO: 0.2 10E3/UL
NRBC BLD MANUAL-RTO: 1 %
NRBC BLD MANUAL-RTO: 1 %
O2/TOTAL GAS SETTING VFR VENT: 30 %
O2/TOTAL GAS SETTING VFR VENT: 60 %
O2/TOTAL GAS SETTING VFR VENT: 80 %
OXYHGB MFR BLD: 97 % (ref 92–100)
OXYHGB MFR BLD: 97 % (ref 92–100)
OXYHGB MFR BLD: 98 % (ref 92–100)
OXYHGB MFR BLD: 98 % (ref 92–100)
P AXIS - MUSE: NORMAL DEGREES
PCO2 BLD: 35 MM HG (ref 35–45)
PCO2 BLD: 36 MM HG (ref 35–45)
PCO2 BLD: 36 MM HG (ref 35–45)
PCO2 BLD: 39 MM HG (ref 35–45)
PCO2 BLD: 39 MM HG (ref 35–45)
PCO2 BLD: 40 MM HG (ref 35–45)
PCO2 BLD: 41 MM HG (ref 35–45)
PCO2 BLDA: 39 MM HG (ref 35–45)
PCO2 BLDA: 40 MM HG (ref 35–45)
PH BLD: 7.3 [PH] (ref 7.35–7.45)
PH BLD: 7.32 [PH] (ref 7.35–7.45)
PH BLD: 7.33 [PH] (ref 7.35–7.45)
PH BLD: 7.35 [PH] (ref 7.35–7.45)
PH BLD: 7.36 [PH] (ref 7.35–7.45)
PH BLD: 7.37 [PH] (ref 7.35–7.45)
PH BLD: 7.37 [PH] (ref 7.35–7.45)
PH BLDA: 7.3 [PH] (ref 7.35–7.45)
PH BLDA: 7.32 [PH] (ref 7.35–7.45)
PHOSPHATE SERPL-MCNC: 3.3 MG/DL (ref 2.5–4.5)
PHOSPHATE SERPL-MCNC: 3.9 MG/DL (ref 2.5–4.5)
PLAT MORPH BLD: ABNORMAL
PLATELET # BLD AUTO: 116 10E3/UL (ref 150–450)
PLATELET # BLD AUTO: 67 10E3/UL (ref 150–450)
PLATELET # BLD AUTO: 81 10E3/UL (ref 150–450)
PLATELET # BLD AUTO: 91 10E3/UL (ref 150–450)
PLATELET # BLD AUTO: 91 10E3/UL (ref 150–450)
PO2 BLD: 108 MM HG (ref 80–105)
PO2 BLD: 109 MM HG (ref 80–105)
PO2 BLD: 113 MM HG (ref 80–105)
PO2 BLD: 116 MM HG (ref 80–105)
PO2 BLD: 121 MM HG (ref 80–105)
PO2 BLD: 123 MM HG (ref 80–105)
PO2 BLD: 143 MM HG (ref 80–105)
PO2 BLDA: 208 MM HG (ref 80–105)
PO2 BLDA: 359 MM HG (ref 80–105)
POLYCHROMASIA BLD QL SMEAR: SLIGHT
POLYCHROMASIA BLD QL SMEAR: SLIGHT
POTASSIUM BLD-SCNC: 3.9 MMOL/L (ref 3.5–5)
POTASSIUM BLD-SCNC: 4 MMOL/L (ref 3.5–5)
POTASSIUM SERPL-SCNC: 3.9 MMOL/L (ref 3.4–5.3)
POTASSIUM SERPL-SCNC: 4.2 MMOL/L (ref 3.4–5.3)
POTASSIUM SERPL-SCNC: 4.2 MMOL/L (ref 3.4–5.3)
POTASSIUM SERPL-SCNC: 4.4 MMOL/L (ref 3.4–5.3)
POTASSIUM SERPL-SCNC: 4.5 MMOL/L (ref 3.4–5.3)
PR INTERVAL - MUSE: NORMAL MS
QRS DURATION - MUSE: 92 MS
QT - MUSE: 374 MS
QTC - MUSE: 547 MS
R AXIS - MUSE: -71 DEGREES
RADIOLOGIST FLAGS: ABNORMAL
RBC # BLD AUTO: 3.17 10E6/UL (ref 4.4–5.9)
RBC # BLD AUTO: 3.26 10E6/UL (ref 4.4–5.9)
RBC # BLD AUTO: 3.29 10E6/UL (ref 4.4–5.9)
RBC # BLD AUTO: 3.4 10E6/UL (ref 4.4–5.9)
RBC # BLD AUTO: 3.51 10E6/UL (ref 4.4–5.9)
RBC MORPH BLD: ABNORMAL
SODIUM BLD-SCNC: 137 MMOL/L (ref 133–144)
SODIUM BLD-SCNC: 138 MMOL/L (ref 133–144)
SODIUM SERPL-SCNC: 132 MMOL/L (ref 136–145)
SODIUM SERPL-SCNC: 134 MMOL/L (ref 136–145)
SODIUM SERPL-SCNC: 137 MMOL/L (ref 136–145)
SYSTOLIC BLOOD PRESSURE - MUSE: NORMAL MMHG
T AXIS - MUSE: 91 DEGREES
UFH PPP CHRO-ACNC: <0.1 IU/ML
UNIT ABO/RH: NORMAL
UNIT NUMBER: NORMAL
UNIT STATUS: NORMAL
UNIT TYPE ISBT: 5100
UNIT TYPE ISBT: 7300
VENTRICULAR RATE- MUSE: 129 BPM
WBC # BLD AUTO: 11.5 10E3/UL (ref 4–11)
WBC # BLD AUTO: 14.2 10E3/UL (ref 4–11)
WBC # BLD AUTO: 16.3 10E3/UL (ref 4–11)
WBC # BLD AUTO: 19.5 10E3/UL (ref 4–11)
WBC # BLD AUTO: 9.9 10E3/UL (ref 4–11)

## 2022-07-13 PROCEDURE — 999N000015 HC STATISTIC ARTERIAL MONITORING DAILY

## 2022-07-13 PROCEDURE — 99233 SBSQ HOSP IP/OBS HIGH 50: CPT | Mod: 25 | Performed by: INTERNAL MEDICINE

## 2022-07-13 PROCEDURE — 36415 COLL VENOUS BLD VENIPUNCTURE: CPT | Performed by: THORACIC SURGERY (CARDIOTHORACIC VASCULAR SURGERY)

## 2022-07-13 PROCEDURE — 258N000003 HC RX IP 258 OP 636: Performed by: NURSE ANESTHETIST, CERTIFIED REGISTERED

## 2022-07-13 PROCEDURE — 250N000011 HC RX IP 250 OP 636: Performed by: SURGERY

## 2022-07-13 PROCEDURE — 83605 ASSAY OF LACTIC ACID: CPT | Performed by: SURGERY

## 2022-07-13 PROCEDURE — 85730 THROMBOPLASTIN TIME PARTIAL: CPT | Performed by: SURGERY

## 2022-07-13 PROCEDURE — 250N000009 HC RX 250: Performed by: STUDENT IN AN ORGANIZED HEALTH CARE EDUCATION/TRAINING PROGRAM

## 2022-07-13 PROCEDURE — 82310 ASSAY OF CALCIUM: CPT | Performed by: SURGERY

## 2022-07-13 PROCEDURE — 82805 BLOOD GASES W/O2 SATURATION: CPT | Performed by: SURGERY

## 2022-07-13 PROCEDURE — 87040 BLOOD CULTURE FOR BACTERIA: CPT | Performed by: THORACIC SURGERY (CARDIOTHORACIC VASCULAR SURGERY)

## 2022-07-13 PROCEDURE — 999N000185 HC STATISTIC TRANSPORT TIME EA 15 MIN

## 2022-07-13 PROCEDURE — 84460 ALANINE AMINO (ALT) (SGPT): CPT | Performed by: SURGERY

## 2022-07-13 PROCEDURE — 84100 ASSAY OF PHOSPHORUS: CPT | Performed by: SURGERY

## 2022-07-13 PROCEDURE — C9113 INJ PANTOPRAZOLE SODIUM, VIA: HCPCS | Performed by: SURGERY

## 2022-07-13 PROCEDURE — 82803 BLOOD GASES ANY COMBINATION: CPT | Performed by: SURGERY

## 2022-07-13 PROCEDURE — 410N000003 HC PER-PERFUSION 1ST 30 MIN: Performed by: THORACIC SURGERY (CARDIOTHORACIC VASCULAR SURGERY)

## 2022-07-13 PROCEDURE — 93283 PRGRMG EVAL IMPLANTABLE DFB: CPT

## 2022-07-13 PROCEDURE — P9016 RBC LEUKOCYTES REDUCED: HCPCS | Performed by: SURGERY

## 2022-07-13 PROCEDURE — 250N000024 HC ISOFLURANE, PER MIN: Performed by: THORACIC SURGERY (CARDIOTHORACIC VASCULAR SURGERY)

## 2022-07-13 PROCEDURE — 94640 AIRWAY INHALATION TREATMENT: CPT

## 2022-07-13 PROCEDURE — 83051 HEMOGLOBIN PLASMA: CPT | Performed by: SURGERY

## 2022-07-13 PROCEDURE — 200N000002 HC R&B ICU UMMC

## 2022-07-13 PROCEDURE — 250N000009 HC RX 250: Performed by: NURSE ANESTHETIST, CERTIFIED REGISTERED

## 2022-07-13 PROCEDURE — 85610 PROTHROMBIN TIME: CPT | Performed by: SURGERY

## 2022-07-13 PROCEDURE — 94640 AIRWAY INHALATION TREATMENT: CPT | Mod: 76

## 2022-07-13 PROCEDURE — 84132 ASSAY OF SERUM POTASSIUM: CPT

## 2022-07-13 PROCEDURE — 71045 X-RAY EXAM CHEST 1 VIEW: CPT | Mod: 26 | Performed by: RADIOLOGY

## 2022-07-13 PROCEDURE — 250N000011 HC RX IP 250 OP 636: Performed by: NURSE ANESTHETIST, CERTIFIED REGISTERED

## 2022-07-13 PROCEDURE — 93283 PRGRMG EVAL IMPLANTABLE DFB: CPT | Mod: 26 | Performed by: INTERNAL MEDICINE

## 2022-07-13 PROCEDURE — 87040 BLOOD CULTURE FOR BACTERIA: CPT | Performed by: SURGERY

## 2022-07-13 PROCEDURE — 85520 HEPARIN ASSAY: CPT | Performed by: SURGERY

## 2022-07-13 PROCEDURE — 94003 VENT MGMT INPAT SUBQ DAY: CPT

## 2022-07-13 PROCEDURE — 250N000013 HC RX MED GY IP 250 OP 250 PS 637: Performed by: SURGERY

## 2022-07-13 PROCEDURE — 82330 ASSAY OF CALCIUM: CPT | Performed by: SURGERY

## 2022-07-13 PROCEDURE — 82803 BLOOD GASES ANY COMBINATION: CPT

## 2022-07-13 PROCEDURE — 360N000076 HC SURGERY LEVEL 3, PER MIN: Performed by: THORACIC SURGERY (CARDIOTHORACIC VASCULAR SURGERY)

## 2022-07-13 PROCEDURE — 272N000001 HC OR GENERAL SUPPLY STERILE: Performed by: THORACIC SURGERY (CARDIOTHORACIC VASCULAR SURGERY)

## 2022-07-13 PROCEDURE — 80069 RENAL FUNCTION PANEL: CPT | Performed by: SURGERY

## 2022-07-13 PROCEDURE — 999N000157 HC STATISTIC RCP TIME EA 10 MIN

## 2022-07-13 PROCEDURE — G0463 HOSPITAL OUTPT CLINIC VISIT: HCPCS

## 2022-07-13 PROCEDURE — 370N000017 HC ANESTHESIA TECHNICAL FEE, PER MIN: Performed by: THORACIC SURGERY (CARDIOTHORACIC VASCULAR SURGERY)

## 2022-07-13 PROCEDURE — 85384 FIBRINOGEN ACTIVITY: CPT | Performed by: SURGERY

## 2022-07-13 PROCEDURE — 85347 COAGULATION TIME ACTIVATED: CPT

## 2022-07-13 PROCEDURE — 84100 ASSAY OF PHOSPHORUS: CPT | Performed by: THORACIC SURGERY (CARDIOTHORACIC VASCULAR SURGERY)

## 2022-07-13 PROCEDURE — 84132 ASSAY OF SERUM POTASSIUM: CPT | Performed by: SURGERY

## 2022-07-13 PROCEDURE — 83735 ASSAY OF MAGNESIUM: CPT | Performed by: SURGERY

## 2022-07-13 PROCEDURE — 85027 COMPLETE CBC AUTOMATED: CPT | Performed by: SURGERY

## 2022-07-13 PROCEDURE — 410N000004: Performed by: THORACIC SURGERY (CARDIOTHORACIC VASCULAR SURGERY)

## 2022-07-13 PROCEDURE — 85379 FIBRIN DEGRADATION QUANT: CPT | Performed by: SURGERY

## 2022-07-13 PROCEDURE — 250N000011 HC RX IP 250 OP 636: Performed by: THORACIC SURGERY (CARDIOTHORACIC VASCULAR SURGERY)

## 2022-07-13 PROCEDURE — 85300 ANTITHROMBIN III ACTIVITY: CPT | Performed by: SURGERY

## 2022-07-13 PROCEDURE — 99291 CRITICAL CARE FIRST HOUR: CPT | Mod: 24 | Performed by: ANESTHESIOLOGY

## 2022-07-13 PROCEDURE — 71045 X-RAY EXAM CHEST 1 VIEW: CPT

## 2022-07-13 PROCEDURE — 999N000063 XR CHEST PORT 1 VIEW

## 2022-07-13 PROCEDURE — 36415 COLL VENOUS BLD VENIPUNCTURE: CPT

## 2022-07-13 PROCEDURE — P9037 PLATE PHERES LEUKOREDU IRRAD: HCPCS | Performed by: SURGERY

## 2022-07-13 PROCEDURE — 83735 ASSAY OF MAGNESIUM: CPT | Performed by: THORACIC SURGERY (CARDIOTHORACIC VASCULAR SURGERY)

## 2022-07-13 PROCEDURE — 85007 BL SMEAR W/DIFF WBC COUNT: CPT | Performed by: SURGERY

## 2022-07-13 PROCEDURE — 36415 COLL VENOUS BLD VENIPUNCTURE: CPT | Performed by: SURGERY

## 2022-07-13 PROCEDURE — 0PQ00ZZ REPAIR STERNUM, OPEN APPROACH: ICD-10-PCS | Performed by: THORACIC SURGERY (CARDIOTHORACIC VASCULAR SURGERY)

## 2022-07-13 PROCEDURE — 258N000003 HC RX IP 258 OP 636: Performed by: THORACIC SURGERY (CARDIOTHORACIC VASCULAR SURGERY)

## 2022-07-13 RX ORDER — NICOTINE POLACRILEX 4 MG
15-30 LOZENGE BUCCAL
Status: DISCONTINUED | OUTPATIENT
Start: 2022-07-13 | End: 2022-08-21 | Stop reason: HOSPADM

## 2022-07-13 RX ORDER — DEXTROSE MONOHYDRATE 100 MG/ML
INJECTION, SOLUTION INTRAVENOUS CONTINUOUS PRN
Status: DISCONTINUED | OUTPATIENT
Start: 2022-07-13 | End: 2022-07-16

## 2022-07-13 RX ORDER — SODIUM CHLORIDE, SODIUM LACTATE, POTASSIUM CHLORIDE, CALCIUM CHLORIDE 600; 310; 30; 20 MG/100ML; MG/100ML; MG/100ML; MG/100ML
INJECTION, SOLUTION INTRAVENOUS CONTINUOUS PRN
Status: DISCONTINUED | OUTPATIENT
Start: 2022-07-13 | End: 2022-07-13

## 2022-07-13 RX ORDER — DEXTROSE MONOHYDRATE 25 G/50ML
25-50 INJECTION, SOLUTION INTRAVENOUS
Status: DISCONTINUED | OUTPATIENT
Start: 2022-07-13 | End: 2022-08-21 | Stop reason: HOSPADM

## 2022-07-13 RX ORDER — SODIUM CHLORIDE, SODIUM GLUCONATE, SODIUM ACETATE, POTASSIUM CHLORIDE AND MAGNESIUM CHLORIDE 526; 502; 368; 37; 30 MG/100ML; MG/100ML; MG/100ML; MG/100ML; MG/100ML
INJECTION, SOLUTION INTRAVENOUS CONTINUOUS PRN
Status: DISCONTINUED | OUTPATIENT
Start: 2022-07-13 | End: 2022-07-13

## 2022-07-13 RX ORDER — FENTANYL CITRATE 50 UG/ML
INJECTION, SOLUTION INTRAMUSCULAR; INTRAVENOUS PRN
Status: DISCONTINUED | OUTPATIENT
Start: 2022-07-13 | End: 2022-07-13

## 2022-07-13 RX ORDER — NOREPINEPHRINE BITARTRATE 0.06 MG/ML
.01-.6 INJECTION, SOLUTION INTRAVENOUS CONTINUOUS
Status: DISCONTINUED | OUTPATIENT
Start: 2022-07-13 | End: 2022-07-13 | Stop reason: HOSPADM

## 2022-07-13 RX ADMIN — PANTOPRAZOLE SODIUM 40 MG: 40 INJECTION, POWDER, FOR SOLUTION INTRAVENOUS at 20:23

## 2022-07-13 RX ADMIN — Medication 1 PACKET: at 20:23

## 2022-07-13 RX ADMIN — SODIUM BICARBONATE 50 MEQ: 84 INJECTION INTRAVENOUS at 20:19

## 2022-07-13 RX ADMIN — ACETAMINOPHEN 975 MG: 325 TABLET, FILM COATED ORAL at 21:29

## 2022-07-13 RX ADMIN — DULOXETINE 30 MG: 30 CAPSULE, DELAYED RELEASE ORAL at 08:32

## 2022-07-13 RX ADMIN — INSULIN ASPART 3 UNITS: 100 INJECTION, SOLUTION INTRAVENOUS; SUBCUTANEOUS at 04:00

## 2022-07-13 RX ADMIN — PROPOFOL 45 MCG/KG/MIN: 10 INJECTION, EMULSION INTRAVENOUS at 15:15

## 2022-07-13 RX ADMIN — SENNOSIDES AND DOCUSATE SODIUM 1 TABLET: 8.6; 5 TABLET ORAL at 00:14

## 2022-07-13 RX ADMIN — VANCOMYCIN HYDROCHLORIDE 1250 MG: 10 INJECTION, POWDER, LYOPHILIZED, FOR SOLUTION INTRAVENOUS at 14:47

## 2022-07-13 RX ADMIN — Medication 150 MCG/HR: at 22:14

## 2022-07-13 RX ADMIN — FLUTICASONE PROPIONATE AND SALMETEROL XINAFOATE 2 PUFF: 115; 21 AEROSOL, METERED RESPIRATORY (INHALATION) at 08:20

## 2022-07-13 RX ADMIN — CEFEPIME HYDROCHLORIDE 2 G: 2 INJECTION, POWDER, FOR SOLUTION INTRAVENOUS at 09:41

## 2022-07-13 RX ADMIN — PROPOFOL 45 MCG/KG/MIN: 10 INJECTION, EMULSION INTRAVENOUS at 21:25

## 2022-07-13 RX ADMIN — SODIUM CHLORIDE, SODIUM GLUCONATE, SODIUM ACETATE, POTASSIUM CHLORIDE AND MAGNESIUM CHLORIDE: 526; 502; 368; 37; 30 INJECTION, SOLUTION INTRAVENOUS at 17:15

## 2022-07-13 RX ADMIN — Medication 1 PACKET: at 14:48

## 2022-07-13 RX ADMIN — ASPIRIN 81 MG CHEWABLE TABLET 81 MG: 81 TABLET CHEWABLE at 08:32

## 2022-07-13 RX ADMIN — PROPOFOL 40 MCG/KG/MIN: 10 INJECTION, EMULSION INTRAVENOUS at 11:15

## 2022-07-13 RX ADMIN — Medication 50 MG: at 17:17

## 2022-07-13 RX ADMIN — ACETAMINOPHEN 975 MG: 325 TABLET, FILM COATED ORAL at 14:47

## 2022-07-13 RX ADMIN — HYDROXYCHLOROQUINE SULFATE 200 MG: 200 TABLET, FILM COATED ORAL at 08:32

## 2022-07-13 RX ADMIN — PANTOPRAZOLE SODIUM 40 MG: 40 INJECTION, POWDER, FOR SOLUTION INTRAVENOUS at 08:35

## 2022-07-13 RX ADMIN — Medication 150 MCG/HR: at 05:36

## 2022-07-13 RX ADMIN — SENNOSIDES AND DOCUSATE SODIUM 1 TABLET: 8.6; 5 TABLET ORAL at 20:23

## 2022-07-13 RX ADMIN — MULTIVITAMIN 15 ML: LIQUID ORAL at 08:32

## 2022-07-13 RX ADMIN — FENTANYL CITRATE 100 MCG: 50 INJECTION, SOLUTION INTRAMUSCULAR; INTRAVENOUS at 17:41

## 2022-07-13 RX ADMIN — FLUTICASONE PROPIONATE AND SALMETEROL XINAFOATE 2 PUFF: 115; 21 AEROSOL, METERED RESPIRATORY (INHALATION) at 19:33

## 2022-07-13 RX ADMIN — SODIUM CHLORIDE, POTASSIUM CHLORIDE, SODIUM LACTATE AND CALCIUM CHLORIDE: 600; 310; 30; 20 INJECTION, SOLUTION INTRAVENOUS at 17:11

## 2022-07-13 RX ADMIN — PROPOFOL 40 MCG/KG/MIN: 10 INJECTION, EMULSION INTRAVENOUS at 05:39

## 2022-07-13 RX ADMIN — HUMAN INSULIN 1 UNITS/HR: 100 INJECTION, SOLUTION SUBCUTANEOUS at 10:05

## 2022-07-13 RX ADMIN — CEFEPIME HYDROCHLORIDE 2 G: 2 INJECTION, POWDER, FOR SOLUTION INTRAVENOUS at 23:22

## 2022-07-13 RX ADMIN — ACETAMINOPHEN 975 MG: 325 TABLET, FILM COATED ORAL at 00:14

## 2022-07-13 RX ADMIN — Medication 1 PACKET: at 08:35

## 2022-07-13 RX ADMIN — ACETAMINOPHEN 975 MG: 325 TABLET, FILM COATED ORAL at 08:32

## 2022-07-13 RX ADMIN — SUGAMMADEX 200 MG: 100 INJECTION, SOLUTION INTRAVENOUS at 18:43

## 2022-07-13 ASSESSMENT — ACTIVITIES OF DAILY LIVING (ADL)
ADLS_ACUITY_SCORE: 38
ADLS_ACUITY_SCORE: 38
ADLS_ACUITY_SCORE: 40
ADLS_ACUITY_SCORE: 38
ADLS_ACUITY_SCORE: 40
ADLS_ACUITY_SCORE: 38
ADLS_ACUITY_SCORE: 36
ADLS_ACUITY_SCORE: 40
ADLS_ACUITY_SCORE: 38
ADLS_ACUITY_SCORE: 40
ADLS_ACUITY_SCORE: 38
ADLS_ACUITY_SCORE: 40

## 2022-07-13 NOTE — CONSULTS
Reviewed RN consult for  forehead  As of 7/1/22 all WOC consults must be entered by provider.    Provider order required for WOC service.   WOC RN requested bedside RN obtain provider order/consult.   WOC consult completed by WOC- RN, await provider consult if services desired.

## 2022-07-13 NOTE — PLAN OF CARE
Major Shift Events: Pt went to OR at 1700 for washout and chest closure. He returned to unit at 1850.     T max 99.7F. Sedated on fentanyl and propofol. Withdraws all extremities. Vent settings unchanged. PPM/ICD settings changed to previous parameters. LVAD/RVAD numbers wnl, no alarms. Bloody oral secretions w/ clots - team aware. Started on insulin gtt, BG 130s-170s. Fitzgerald in place, continues to have low urine output, 15-30ml/hr. R temporal pressure sore assessed by WOC.     Plan: Remove left femoral art line. Continue to check ACT q8h.     For vital signs and complete assessments, please see documentation flowsheets.

## 2022-07-13 NOTE — PROGRESS NOTES
VAD Coordinator in OR throughout duration of INCISION AND DRAINAGE, WOUND, CHEST, WITH IRRIGATION surgery. VAD parameters were monitored in collaboration with anesthesia. Report given to Gina LONDON upon leaving the OR.       VAD parameters at START of surgery:  o Flow: 3.4 lpm  o Speed: 5200 rpm  o PI (or flow waveform peak/trough for HW): 3.1  o Power: 3.6 persaud    VAD parameters at END of surgery:  o Flow: 3.5 lpm  o Speed: 5200 rpm  o PI (or flow waveform peak/trough for HW): 1.8  o Power: 3.5 persaud    Speed adjustments made during OR: none     Flow range: 3.2-3.5 lpm    PI (or flow waveform peak/trough for HW) range: 1.7-5.0    Factors noted to cause a significant variation in VAD parameters: anesthesia     Other significant events: none to note     Please page the VAD Coordinator on-call with any VAD related questions (* * * [phone], job code [phone] from an internal line).     Meredith Long RN

## 2022-07-13 NOTE — PROGRESS NOTES
Deactivated ICD therapies on 7/12/2022 at 9:50 PM.    Reactivated ICD therapies on 7/13/2022 at 5:00 AM      Elvin Armendariz MD  Cardiovascular disease fellow  [facility]  [phone]  07/12/2022 9:57 PM

## 2022-07-13 NOTE — PROGRESS NOTES
VAD Shift Summary:      VAD Equipment:  Console serial number: Primary [id number], secondary [id number]  Circuit Lot number: [id number]  Pump head lot number: [id number]       Patient remains on RVAD, all equipment is functioning and alarms are appropriately set. RPM's 4050 with flow range 3.2-3.3 L/min. Circuit remains free of air, clot and fibrin. Cannulas are secure with no bleeding from site. Extremities are warm to touch.     Significant Shift Events: We went to the OR and performed a washout and closure of the chest with no significant events    Vent settings:  Vent Mode: CMV/AC  (Continuous Mandatory Ventilation/ Assist Control)  FiO2 (%): 30 %  Resp Rate (Set): 12 breaths/min  Tidal Volume (Set, mL): 450 mL  PEEP (cm H2O): 5 cmH2O  Pressure Support (cm H2O): 7 cmH2O  Resp: 14  .    Anticoagulation is off.    Urine output is as charted, blood loss was none. No product given.       Intake/Output Summary (Last 24 hours) at 2022 1855  Last data filed at 2022 1824  Gross per 24 hour   Intake 5386.95 ml   Output 2015 ml   Net 3371.95 ml       ECHO:  No results found for this or any previous visit.  No results found for this or any previous visit.      CXR:  Recent Results (from the past 24 hour(s))   Echo Limited    Narrative    [id number]  [id number]  [id number]  [id number]^LOLIS^TALISHA     [facility]  Echocardiography [facility]  [address] [place] [id number]     Name: OLEG KAUR  MRN: [id number]  : 1960  Study Date: 2022 08:35 PM  Age: 61 yrs  Gender: Male  Patient Location: [facility]  Reason For Study: Tamponade  Ordering Physician: TALISHA DANIELLE  Performed By: Fam Davies RDCS     BSA: 1.8 m2  Height: 67 in  Weight: 157 lb  ______________________________________________________________________________  Procedure  Limited Portable Echo Adult. (Emergent exam, abbreviated study performed).  Poor quality two-dimensional was performed and  interpreted. The final echo  results were communicated to Elvin Armendariz at the bedside..  ______________________________________________________________________________  Interpretation Summary  s/p HM3 LVAD 7/8/22, s/p 29F Protek duo.  Global Left ventricular function cannot be assessed.  Global right ventricular function is moderately reduced.  No pericardial effusion is present.  ______________________________________________________________________________  Left Ventricle  Global Left ventricular function cannot be assessed.     Right Ventricle  Global right ventricular function is moderately reduced.     Pericardium  No pericardial effusion is present.     ______________________________________________________________________________  Report approved by: Tashia FLORES 07/12/2022 09:21 PM     ______________________________________________________________________________      XR Chest Port 1 View    Narrative    EXAM: XR CHEST PORT 1 VIEW  7/13/2022 1:05 AM     HISTORY:  RVAD/LVAD/ETT       COMPARISON:  7/12/2022    FINDINGS: Single view of the chest. Endotracheal tube tip projects  over the midthoracic trachea. Enteric tube courses below the diaphragm  and beyond the field-of-view. Esophageal temperature probe projects  over the proximal esophagus. Right IJ RVAD with tip over the distal  main pulmonary trunk. Right upper extremity PICC tip is obscured in  the region of the mid SVC. Left chest wall cardiac. Partially imaged a  VAD. Mediastinal drains and left basilar chest tube. Chest remains  open with surgical sponges over the mediastinum.     Stable enlarged cardiac silhouette. Small bilateral pleural effusions.  No appreciable pneumothorax. Similar perihilar and bibasilar pulmonary  opacities.      Impression    IMPRESSION: Chest remains open with surgical gauze at the midline.   1. Stable support devices.  2. No significant change in small pleural effusions with perihilar and  bibasilar pulmonary  opacities.    I have personally reviewed the examination and initial interpretation  and I agree with the findings.    LAI HERNADEZ MD         SYSTEM ID:  [id number]   Cardiac Device Check - Inpatient   Result Value    Date Time Interrogation Session [id number]    Implantable Pulse Generator  Medtronic    Implantable Pulse Generator Model [id number] Evera XT DR    Implantable Pulse Generator Serial Number [id number]    Type Interrogation Session In Clinic    Clinic Name [facility]    Implantable Pulse Generator Type Defibrillator    Implantable Pulse Generator Implant Date 20180412    Implantable Lead  Medtronic    Implantable Lead Model 5076 CapSureFix Novus    Implantable Lead Serial Number [id number]    Implantable Lead Implant Date 20060306    Implantable Lead Polarity Type Bipolar Lead    Implantable Lead Location Detail 1 UNKNOWN    Implantable Lead Location Right Atrium    Implantable Lead  Medtronic    Implantable Lead Model 6949 [employer] [place]    Implantable Lead Serial Number [id number]    Implantable Lead Implant Date 20060306    Implantable Lead Polarity Type Bipolar Lead    Implantable Lead Location Detail 1 UNKNOWN    Implantable Lead Location Right Ventricle    Marcos Setting Mode (NBG Code) MVP_AAI_DDD    Marcos Setting Lower Rate Limit 50    Marcos Setting Maximum Tracking Rate 130    Marcos Setting Maximum Sensor Rate 115    Marcos Setting Hysterisis Rate DISABLED    Marcos Setting TRISTEN Delay Low 350    Marcos Setting PAV Delay Low 350    Marcos Setting AT Mode Switch Rate 171    Lead Channel Setting Sensing Polarity Bipolar    Lead Channel Setting Sensing Anode Location Right Atrium    Lead Channel Setting Sensing Anode Terminal Ring    Lead Channel Setting Sensing Cathode Location Right Atrium    Lead Channel Setting Sensing Cathode Terminal Tip    Lead Channel Setting Sensing Sensitivity 0.3    Lead Channel Setting Sensing Polarity Bipolar     Lead Channel Setting Sensing Anode Location Right Ventricle    Lead Channel Setting Sensing Anode Terminal Ring    Lead Channel Setting Sensing Cathode Location Right Ventricle    Lead Channel Setting Sensing Cathode Terminal Tip    Lead Channel Setting Sensing Sensitivity 0.3    Lead Channel Setting Pacing Polarity Bipolar    Lead Channel Setting Pacing Anode Location Right Atrium    Lead Channel Setting Pacing Anode Terminal Ring    Lead Channel Setting Sensing Cathode Location Right Atrium    Lead Channel Setting Sensing Cathode Terminal Tip    Lead Channel Setting Pacing Pulse Width 0.4    Lead Channel Setting Pacing Amplitude 2    Lead Channel Setting Pacing Capture Mode Adaptive    Lead Channel Setting Pacing Polarity Bipolar    Lead Channel Setting Pacing Anode Location Right Ventricle    Lead Channel Setting Pacing Anode Terminal Ring    Lead Channel Setting Sensing Cathode Location Right Ventricle    Lead Channel Setting Sensing Cathode Terminal Tip    Lead Channel Setting Pacing Pulse Width 1    Lead Channel Setting Pacing Amplitude 5    Lead Channel Setting Pacing Capture Mode Adaptive    Zone Setting Type Category VF    Zone Setting Detection Interval 320    Zone Setting Detection Beats Numerator 30    Zone Setting Detection Beats Denominator 40    Zone Setting Type Category VT    Zone Setting Detection Interval 280    Zone Setting Type Category VT    Zone Setting Detection Interval 350    Zone Setting Type Category VT    Zone Setting Detection Interval 400    Zone Setting Type Category ATRIAL_FIBRILLATION    Zone Setting Type Category AT/AF    Zone Setting Detection Interval 350    Lead Channel Impedance Value 285    Lead Channel Sensing Intrinsic Amplitude 0.4    Lead Channel Pacing Threshold Amplitude 0.75    Lead Channel Pacing Threshold Pulse Width 0.4    Lead Channel Impedance Value 722    Lead Channel Impedance Value 646    Lead Channel Sensing Intrinsic Amplitude 1    Lead Channel Pacing  Threshold Amplitude 3.00    Lead Channel Pacing Threshold Pulse Width 1.0    Battery Date Time of Measurements [id number]    Battery Status OK    Battery RRT Trigger 2.727    Battery Remaining Longevity 63    Battery Voltage 2.91    Capacitor Charge Type Reformation    Capacitor Last Charge Date Time 35149940325928    Capacitor Charge Time 3.973    Capacitor Charge Energy 18    Marcos Statistic Date Time Start 35132479612486    Marcos Statistic Date Time End 77897833545584    Marcos Statistic RA Percent Paced 18.52    Marcos Statistic RV Percent Paced 18.6    Marcos Statistic AP  Percent 18.49    Marcos Statistic AS  Percent 0.19    Marcos Statistic AP VS Percent 0.05    Marcos Statistic AS VS Percent 81.27    Atrial Tachy Statistic Date Time Start 16352009510172    Atrial Tachy Statistic Date Time End 29243063846259    Atrial Tachy Statistic AT/AF [place] Percent 0    Therapy Statistic Recent Shocks Delivered 0    Therapy Statistic Recent Shocks Aborted 0    Therapy Statistic Recent ATP Delivered 0    Therapy Statistic Recent Date Time Start 38116461724754    Therapy Statistic Recent Date Time End 96082928248466    Therapy Statistic Total Shocks Delivered 1    Therapy Statistic Total Shocks Aborted 0    Therapy Statistic Total ATP Delivered 0    Therapy Statistic Total  Date Time Start 69694612373274    Therapy Statistic Total  Date Time End 53771443203152    Episode Statistic Recent Count 0    Episode Statistic Type Category AT/AF    Episode Statistic Recent Count 0    Episode Statistic Type Category SVT    Episode Statistic Recent Count 0    Episode Statistic Type Category VT    Episode Statistic Recent Count 0    Episode Statistic Type Category VF    Episode Statistic Recent Count 0    Episode Statistic Type Category VT    Episode Statistic Recent Count 0    Episode Statistic Type Category VT    Episode Statistic Recent Count 0    Episode Statistic Type Category VT    Episode Statistic Recent Date Time Start  87848305204242    Episode Statistic Recent Date Time End 37574205507505    Episode Statistic Recent Date Time Start 10144981804800    Episode Statistic Recent Date Time End 84805756613389    Episode Statistic Recent Date Time Start 50839460719967    Episode Statistic Recent Date Time End 47669365706661    Episode Statistic Recent Date Time Start 33112571998668    Episode Statistic Recent Date Time End 50674877606937    Episode Statistic Recent Date Time Start 37156848944501    Episode Statistic Recent Date Time End 26001054657744    Episode Statistic Recent Date Time Start 41481523324331    Episode Statistic Recent Date Time End 76791639259032    Episode Statistic Recent Date Time Start 12307772882259    Episode Statistic Recent Date Time End 04716946833170    Episode Statistic Total Count 33    Episode Statistic Type Category AT/AF    Episode Statistic Total Count 0    Episode Statistic Type Category SVT    Episode Statistic Total Count 9    Episode Statistic Type Category VT    Episode Statistic Total Count 1    Episode Statistic Type Category VF    Episode Statistic Total Count 0    Episode Statistic Type Category VT    Episode Statistic Total Count 0    Episode Statistic Type Category VT    Episode Statistic Total Count 1    Episode Statistic Type Category VT    Episode Statistic Total Date Time Start 63616633041730    Episode Statistic Total Date Time End 59215842868272    Episode Statistic Total Date Time Start 77040327010699    Episode Statistic Total Date Time End 48631669317287    Episode Statistic Total Date Time Start 14065919724870    Episode Statistic Total Date Time End 12198631832463    Episode Statistic Total Date Time Start 89742812572131    Episode Statistic Total Date Time End 63836620754964    Episode Statistic Total Date Time Start 00908062405580    Episode Statistic Total Date Time End 14745021501892    Episode Statistic Total Date Time Start 83094838610975    Episode Statistic Total Date Time  End 79054205661085    Episode Statistic Total Date Time Start 93136669177766    Episode Statistic Total Date Time End 22689732810292    Narrative    Patient seen in [address] [place] [address] for evaluation and iterative programming of their   ICD per MD orders to verify ICD settings s/p chest wash out last evening.    Device: Medtronic [id number] Evera XT DR  Normal Device Function.  Mode: DDDR /130 --> AAI<=>DDD /130 bpm  AP: 18.5%  : 18.6%  Intrinsic rhythm: ST @ 105 bpm  Short V-V intervals: 0  Thoracic Impedance: Below reference line, suggesting possible fluid   accumulation.  Lead Trends Appear Stable: RV impedance (both bipolar and tip to coil)   continues to trend up. RV capture threshold remains elevated, today   measures 3.0 V @ 1.0 ms. R-wave amplitude remains small, measuring at 1.0   mV today. CVTS aware.  Estimated battery longevity to RRT = 5.2 years. Battery voltage = 2.91 V.  Atrial arrhythmia: Device records 1 AT/AF episode < 1 min in duration for   total time in AT/AF = 3 seconds. No EGM available for further review.  AF burden: <0.1%  Anticoagulant: Heparin gtt currently held per provider orders  Ventricular Arrhythmia: N/R due to VT Monitor turned off.  Setting changes: Mode changed from DDDR to AAI<=>DDD. VT detection zone   (171-188 bpm) turned OFF. VT Monitor zone (150 bpm) turned ON. These   changes were made to restore ICD settings to baseline programming after   post-op programming yesterday evening by Cardiology Fellow. VF detection   zone (>188 bpm) and FVT detection zone (via VF) remain programmed ON, per   previous settings.    Plan: Continue inpatient evaluation and treatment.  RICKIE Mcpherson RN    Dual lead ICD    I have reviewed and interpreted the device interrogation, settings,   programming and nurse's summary. The device is functioning within normal   device parameters. I agree with the current findings, assessment and plan.       Labs:  Recent Labs   Lab 07/13/22  1813 07/13/22  0634  07/13/22  1225 07/13/22  0817   PH 7.30* 7.32* 7.37 7.37   PCO2 39 40 36 36   PO2 208* 359* 123* 121*   HCO3 19* 20* 21 21   O2PER 60.0 80.0 30 30       Lab Results   Component Value Date    HGB 9.9 (L) 07/13/2022    PHGB 90 (H) 07/13/2022    PLT 91 (L) 07/13/2022    FIBR 330 07/12/2022    INR 1.13 07/13/2022    PTT 42 (H) 07/13/2022    DD 2.21 (H) 07/12/2022    ANTCH 92 07/13/2022         Plan is to remain on RVAD support at this time.      Daniel Hager, RT  ECMO Specialist  7/13/2022 6:55 PM

## 2022-07-13 NOTE — ANESTHESIA POSTPROCEDURE EVALUATION
Patient: Dandy Sands    Procedure: Procedure(s):  IRRIGATION AND DEBRIDEMENT, CHEST       Anesthesia Type:  General    Note:  Disposition: ICU            ICU Sign Out: Anesthesiologist/ICU physician sign out WAS performed   Postop Pain Control: Uneventful            Sign Out: Well controlled pain   PONV: No   Neuro/Psych: Uneventful            Sign Out: PLANNED postop sedation   Airway/Respiratory: Uneventful            Sign Out: AIRWAY IN SITU/Resp. Support               Airway in situ/Resp. Support: ETT                 Reason: Planned Pre-op   CV/Hemodynamics: Uneventful            Sign Out: Acceptable CV status; No obvious hypovolemia; No obvious fluid overload   Other NRE:    DID A NON-ROUTINE EVENT OCCUR?            Last vitals:  Vitals:    07/13/22 1400 07/13/22 1500 07/13/22 1600   BP:      Pulse: 108 109 107   Resp: 8 12 14   Temp: 37.4  C (99.3  F) 37.6  C (99.7  F) 37.3  C (99.1  F)   SpO2: 100% 97% 94%       Electronically Signed By: Rigoberto Navarro MD  July 13, 2022  6:49 PM

## 2022-07-13 NOTE — PROGRESS NOTES
VAD Coordinator in OR throughout duration of Washout and chest closure surgery. VAD parameters were monitored in collaboration with anesthesia. Report given to 4E RN upon leaving the OR.       VAD parameters at START of surgery:  o Flow: 3.4 lpm  o Speed: 5200 rpm  o PI (or flow waveform peak/trough for HW): 3.6  o Power: 3.5 persaud    VAD parameters at END of surgery:  o Flow: 3.2 lpm  o Speed: 5200 rpm  o PI (or flow waveform peak/trough for HW): 7.2  o Power: 3.7 persaud    Speed adjustments made during OR: none    Flow range: 2.6-3.4 lpm    PI (or flow waveform peak/trough for HW) range: 3.6-10    Factors noted to cause a significant variation in VAD parameters: Pt was hypertensive.  PIs elevated and flows decreased.    Other significant events: none    Please page the VAD Coordinator on-call with any VAD related questions (* * * [phone], job code [phone] from an internal line).     ALLY SHAH RN

## 2022-07-13 NOTE — ANESTHESIA CARE TRANSFER NOTE
Patient: Dandy Sands    Procedure: Procedure(s):  INCISION AND DRAINAGE, WOUND, CHEST, WITH IRRIGATION       Diagnosis: Bleeding [R58]  Diagnosis Additional Information: No value filed.    Anesthesia Type:   No value filed.     Note:    Oropharynx: endotracheal tube in place, nasal gatric tube in place and ventilatory support  Level of Consciousness: iatrogenic sedation      Independent Airway: airway patency not satisfactory and stable  Dentition: dentition unchanged      Patient transferred to: ICU    ICU Handoff: Call for PAUSE to initiate/utilize ICU HANDOFF, Identified Patient, Identified Responsible Provider, Reviewed the Pertinent Medical History, Discussed Surgical Course, Reviewed Intra-OP Anesthesia Management and Issues during Anesthesia, Set Expectations for Post Procedure Period and Allowed Opportunity for Questions and Acknowledgement of Understanding      Vitals:  Vitals Value Taken Time   BP     Temp 34.5  C (94.1  F) 07/12/22 2343   Pulse 64 07/12/22 2343   Resp 21 07/12/22 2343   SpO2 97 % 07/12/22 2338   Vitals shown include unvalidated device data.    Electronically Signed By: Daljit Buckley MD  July 12, 2022  11:45 PM

## 2022-07-13 NOTE — PHARMACY-VANCOMYCIN DOSING SERVICE
Pharmacy Vancomycin Initial Note  Date of Service 2022  Patient's  1960  61 year old, male    Indication: Surgical Prophylaxis (Open Chest)    Current estimated CrCl = Estimated Creatinine Clearance: 89.3 mL/min (based on SCr of 0.89 mg/dL).    Creatinine for last 3 days  7/10/2022:  9:58 AM Creatinine 0.62 mg/dL;  3:47 PM Creatinine 0.53 mg/dL;  9:46 PM Creatinine 0.53 mg/dL  2022:  4:52 AM Creatinine 0.59 mg/dL; 10:11 AM Creatinine 0.65 mg/dL;  7:32 PM Creatinine 0.66 mg/dL; 11:43 PM Creatinine 0.68 mg/dL  2022:  3:49 AM Creatinine 0.66 mg/dL; 10:07 AM Creatinine 0.86 mg/dL;  3:58 PM Creatinine 1.09 mg/dL; 11:48 PM Creatinine 0.89 mg/dL  2022:  3:54 AM Creatinine 0.89 mg/dL    Recent Vancomycin Level(s) for last 3 days  7/10/2022: 10:23 AM Vancomycin 12.8 ug/mL      Vancomycin IV Administrations (past 72 hours)                   vancomycin 1250 mg in 0.9% NaCl 250 mL intermittent infusion 1,250 mg (mg) 1,250 mg New Bag 22 2125     1,250 mg New Bag  1030     1,250 mg New Bag 22 2243     1,250 mg New Bag  0932     1,250 mg New Bag 07/10/22 2204    vancomycin (VANCOCIN) 1000 mg in dextrose 5% 200 mL PREMIX (mg) 1,000 mg New Bag 07/10/22 1039                Nephrotoxins and other renal medications (From now, onward)    Start     Dose/Rate Route Frequency Ordered Stop    22 1500  vancomycin 1250 mg in 0.9% NaCl 250 mL intermittent infusion 1,250 mg         1,250 mg  over 90 Minutes Intravenous EVERY 18 HOURS 22 0833            Contrast Orders - past 72 hours (72h ago, onward)    Start     Dose/Rate Route Frequency Stop    22 1800  iopamidol (ISOVUE-370) solution 90 mL         90 mL Intravenous ONCE 22 1806    22 1300  barium sulfate (EZ PAQUE) oral suspension 96%          Oral ONCE 22 1340              Plan:  1. Start vancomycin  1250 mg IV q18h.   2. Vancomycin monitoring method: Trough (Method 2 = manual dose calculation)  3. Vancomycin  therapeutic monitoring goal: 10-15 mg/L  4. Pharmacy will check vancomycin levels as appropriate in 1-3 Days.    5. Serum creatinine levels will be ordered daily for the first week of therapy and at least twice weekly for subsequent weeks.      Marguerite Prado RPH

## 2022-07-13 NOTE — PROGRESS NOTES
VAD Shift Summary: (1845 to 0715)    VAD Equipment:  Console serial number: Primary [id number], secondary [id number]  Circuit Lot number: [id number]  Pump head lot number: [id number]       Patient remains on RVAD support, all equipment is functioning and alarms are appropriately set. RPMs 4050 with flow range 3.33 - 3.44 L/min. Circuit remains free of visible air and clot; fibrin present at some connectors. Cannulas are secure with no bleeding from site. Extremities are cool.     Significant Shift Events:   Pt transported to OR for washout, returned to ICU without incident. Received x2 Plt + x1 PRBC in OR, received an additional x2 PLT after returning to the ICU d/t Plts of 64. No issues with chugging/low flow alarms on RVAD.    Bleeding/Anticoagulation:  Heparin remains off, ACT range 165 - 177.      Volume Status:  Urine output is 20 - 30 mL/hr      Intake/Output Summary (Last 24 hours) at 7/13/2022 0627  Last data filed at 7/13/2022 0600  Gross per 24 hour   Intake 5277.47 ml   Output 2065 ml   Net 3212.47 ml       Labs:    Recent Labs   Lab 07/13/22  0355 07/12/22  2350 07/12/22  1958 07/12/22  1557   PH 7.33* 7.32* 7.33* 7.31*   PCO2 40 39 35 32*   PO2 116* 143* 123* 90   HCO3 21 20* 18* 16*   O2PER 30 30 30 30       Lab Results   Component Value Date    HGB 9.1 (L) 07/13/2022    PHGB 90 (H) 07/13/2022    PLT 81 (L) 07/13/2022    FIBR 330 07/12/2022    INR 1.18 (H) 07/13/2022    PTT 47 (H) 07/13/2022    DD 2.21 (H) 07/12/2022    ANTCH 74 (L) 07/12/2022       Vent settings:  Vent Mode: CMV/AC  (Continuous Mandatory Ventilation/ Assist Control)  FiO2 (%): 30 %  Resp Rate (Set): 12 breaths/min  Tidal Volume (Set, mL): 450 mL  PEEP (cm H2O): 5 cmH2O  Pressure Support (cm H2O): 7 cmH2O  Resp: 12  .        Plan is to continue RVAD support, titrate flows as indicated.      Lisa Alvarez, RRT-ACCS / ECMO Specialist  7/13/2022 6:27 AM

## 2022-07-13 NOTE — PROGRESS NOTES
CV ICU PROGRESS NOTE  07/13/2022        CO-MORBIDITIES  1. Cardiogenic shock (H)  (primary encounter diagnosis)  2. Ischemic cardiomyopathy  3. Heart failure with reduced ejection fraction, NYHA class III (H)  4. Coronary artery disease involving native coronary artery of native heart without angina pectoris      ASSESSMENT Dandy Sands is a 61 year old male with a PMH of CAD s/p PCI to LAD, MI, ICM, acute on chronic heart failure, s/p CRT-D, severe MR, antiphospholipid antibody syndrome on chronic warfarin, SLE, HTN, HLD, recent hospitalization 5/16-5/26 s/p RCA, LAD stenting who underwent RVAD (29F Protek duo RIJ) LVAD placement (HeartMate 3) on 7/8/22 by Dr. Zamora. Intraoperative course complicated by IABP dysfunction and RV failure, requiring crash onto bypass / vasopressor support, NO, and protek placement. Now s/p chest closure and NO wean 7/11. Return to OR for hemopericardium overnight and chest felt open. Closure planned 7/13.       Changes 7/13:  - no AC today d/t bleeding yesterday (likely tomorrow's goal with heparin gtt 160-180, will reevaluate in AM)  - RTOR today for chest closure  - continue Cefepime (for open chest)  - continue Vancomycin (for open chest)  - continue PPI BID x 72 hours (7/15 change after 12:00 noon)  - restart insulin gtt (stop sliding scale insulin)  - continue to hold Plaquenil   - RASS goal -4 to -5  - discontinue Heparin gtt (order was being held)  - Suture radial a-line     PLAN:   Neuro/ pain/ sedation:  #Acute Postoperative pain  #Depression   #Anxiety due to a medical condition  #Insomnia  - Monitor neurological status. Notify the MD for any acute changes in exam.  - Pain: fentanyl gtt. Scheduled tylenol. PRN tylenol, oxycodone, dilaudid.  - Sedation: propofol gtt, PASS -4 to -5  - Duloxetine 30mg  - Holding PTA meds: hydroxyzine 25mg PRN, zolpidem 5mg, melatonin 3mg, lorazepam 0.5mg    Pulmonary:   #Acute hypoxic respiratory failure on iMV  #Mild-moderate  emphysema  #History of COVID-19  - Off nitric 7/12  - Mechanical ventilation, wean after off chest closure     Cardiovascular:    #S/p LVAD placement (HeartMate 3) on 7/8/22   #S/p RVAD (29F Protek duo RIJ) on 7/8/22  #S/p chest closure 7/11  #Cardiogenic shock  #Advanced ischemic cardiomyopathy, class IV, stage D  #CAD s/p PCI to LAD (2005)  #S/p CRT-D (2006)  #History of MI (2007)  #Severe MR  #Antiphospholipid antibody syndrome on chronic warfarin  #HTN, HLD  #Recent hospitalization 5/16-5/26 s/p RCA, LAD stenting  #Atrial fibrillation   - Monitor hemodynamic status. Chest closure and oxygenator splicing 7/11. Reopened 7/12 for I&D and left open, plan for closure 7/13.  - Goal MAP > 65   - PTA meds: atorvastatin 40mg, clopidogrel 75mg, lasix 40mg, warfarin 5mg  - LVAD management per Advanced HF service  - AC Plan: hold today d/t bleeding yesterday (likely tomorrow's goal with heparin gtt 160-180, will reevalaute)     GI / Nutrition:   #GERD  #Congestive hepatopathy in the setting of cardiac insuffiency  - NPO 2/2 intubation   - Titrate NJT to goal  - Bowel regimen (miralax / senna)  - Nutrition consulted.  - Trend LFT's    Hematemesis / oral mucosal bleeding   7/12 x2, 7/13 x1 (oral, around ETT/ETT clear).   - continue PPI BID x 72 hours (7/15 change after 12:00 noon)   - hgb stable   - OG without bloody output     Renal/ Fluid Balance:    - Strict I/O, daily weights   - Avoid/limit nephrotoxins as able  - Replete lytes PRN per protocol  - PTA meds: tamsulosin 0.4mg (held)  - Fluid goal net even     Endocrine:    #Stress induced hyperglycemia  Preop A1c 5.5%  - Switch SC insulin -> insulin gtt perioperatively   - Goal BG <180 for optimal healing       ID/ Antibiotics:  #Periopearive antibiosis (s/p course of Cefepime, vancomycin, rifampin, fluconazole)  - Start Cefepime and Vancomycin (7/13) for open chest, discharge 24h after chest closed   - Trend fever curve   - Pan culture (7/12) (Bcx x2, UA/UC, endotracheal  sputum culture) negative     Heme:     #Acute blood loss anemia  #Acute blood loss thrombocytopenia  #History of DVT and PE   #Antiphospholipid antibody syndrome  #History of iron deficiency anemia  - Monitor for s/sx of bleeding  - s/p 3u pRBC, 2u Plt 7/12 perioperative needs  - Trend CBC and coags.  - Hgb goal > 8  - Plt goal > 20    Rheumatology:  #SLE  - Chronic, symptoms include arthritis, photosensitivity, fatigue, and skin manifestations  - PTA meds: hydroxychloroquine 200mg, hold 1 week until post repeat  chest closure 7/13    Prophylaxis:    - DVT prophylaxis: SCD only   - PPI  - Bowel regimen  - PT/OT      Disposition:  - CV ICU.        ====================================================    Patient seen, findings and plan discussed with CV ICU staff, Dr. Samano.      Rashard Stephenson MD  Anesthesiology, PGY3    ====================================================        SUBJECTIVE  - Return to OR for hemopericardium onernight, received pRBC x3, plt x2, and was left open.  - Patient intubated and sedated     OBJECTIVE    1. VITAL SIGNS    Temp:  [93.6  F (34.2  C)-100  F (37.8  C)] 98.2  F (36.8  C)  Pulse:  [] 99  Resp:  [10-22] 12  BP: (87)/(60) 87/60  MAP:  [66 mmHg-104 mmHg] 84 mmHg  Arterial Line BP: ()/(54-88) 92/66  FiO2 (%):  [30 %] 30 %  SpO2:  [90 %-100 %] 99 %  Vent Mode: CMV/AC  (Continuous Mandatory Ventilation/ Assist Control)  FiO2 (%): 30 %  Resp Rate (Set): 12 breaths/min  Tidal Volume (Set, mL): 450 mL  PEEP (cm H2O): 5 cmH2O  Pressure Support (cm H2O): 7 cmH2O  Resp: 12        2. INTAKE/ OUTPUT    I/O last 3 completed shifts:  In: 4286.73 [I.V.:1536.73; NG/GT:555]  Out: 1826 [Urine:524; Emesis/NG output:102; Chest Tube:1200]      3. PHYSICAL EXAMINATION    General: sedated  Neuro: RAAS -2   Resp: intubated  CV: SR  Abdomen: Soft, Non-distended  Skin/Incisions: chest closed, right internal jugular cannulation, left femoral CVC/arterial line.  Extremities: warm and well perfused.  LUE ecchymosis, unchanged      4. INVESTIGATIONS    Arterial Blood Gases   Recent Labs   Lab 07/13/22 0355 07/12/22 2350 07/12/22 1958 07/12/22  1557   PH 7.33* 7.32* 7.33* 7.31*   PCO2 40 39 35 32*   PO2 116* 143* 123* 90   HCO3 21 20* 18* 16*     Complete Blood Count   Recent Labs   Lab 07/13/22 0354 07/12/22 2348 07/12/22 2236 07/12/22  1558   WBC 9.9 14.2* 14.4* 23.1*   HGB 9.1* 9.4* 9.3* 8.1*   PLT 81* 67* 82* 137*     Basic Metabolic Panel  Recent Labs   Lab 07/13/22 0358 07/13/22 0354 07/12/22 2350 07/12/22 2348 07/12/22 1957 07/12/22  1558 07/12/22  1129 07/12/22  1007   NA  --  137  --  132*  --  129*  --  133*   POTASSIUM  --  4.2  --  4.4  --  4.8  --  4.8   CHLORIDE  --  106  --  104  --  101  --  104   CO2  --  19*  --  19*  --  15*  --  17*   BUN  --  29.4*  --  28.5*  --  27.6*  --  24.3*   CR  --  0.89  --  0.89  --  1.09  --  0.86   * 185* 103* 112*   < > 192*   < > 222*    < > = values in this interval not displayed.     Liver Function Tests  Recent Labs   Lab 07/13/22 0354 07/12/22 2236 07/12/22  1558 07/12/22  1007 07/12/22  0349 07/11/22  0518 07/11/22  0452 07/11/22  0337 07/10/22  0958 07/10/22  0349 07/09/22  0408 07/08/22  2213 07/08/22  1843 07/08/22  1643 07/08/22  1400 07/08/22  0332 07/07/22  0356   AST  --   --   --   --   --   --   --   --   --   --   --  159*  --  134*  --  32 32   ALT 25  --   --   --  21  --  19  --   --  21   < > 29  --  25  --  39 41   ALKPHOS  --   --   --   --   --   --   --   --   --   --   --  59  --  51  --  127 125   BILITOTAL  --   --   --   --   --   --   --   --   --   --   --  1.0  --  1.0  --  0.3 0.3   ALBUMIN 2.9*  --   --   --  2.3*  --   --  0.6*  --  3.1*   < > 2.5*  --  2.3*  --  3.5 3.5   INR 1.18* 1.23* 1.35* 1.28* 1.26*   < >  --   --    < > 1.24*   < > 1.45*   < > 1.87*   < > 1.21* 1.13    < > = values in this interval not displayed.     Pancreatic Enzymes  No lab results found in last 7 days.  Coagulation Profile  Recent  Labs   Lab 07/13/22  0354 07/12/22  2348 07/12/22  2236 07/12/22  1558 07/12/22  1007   INR 1.18*  --  1.23* 1.35* 1.28*   PTT 47* 55* 43* 52* 51*         5. RADIOLOGY    Recent Results (from the past 24 hour(s))   XR Feeding Tube Placement    Narrative    FEEDING TUBE PLACEMENT 7/11/2022 2:00 PM    INDICATION: Nutritional needs, open chest     COMPARISON: None.    FLUOROSCOPY TIME: 2.05 minutes    FINDINGS: The feeding tube was advanced under fluoroscopic guidance  with final position of tip in the second portion of the duodenum. A  small amount of barium was injected to demonstrate placement within  the small bowel. The tube was flushed with sterile water and secured  via nasal bridle. There were no complications of the procedure.   Partially visualized chest tubes/drains and IVC filter.      Impression    IMPRESSION: Uncomplicated feeding tube placement with tip in the  second portion of the duodenum.    I, JOSE M OSHEA MD, attest that I was present for all critical  portions of the procedure and was immediately available to provide  guidance and assistance during the remainder of the procedure.    I have personally reviewed the examination and initial interpretation  and I agree with the findings.    JOSE M OSHEA MD         SYSTEM ID:  [id number]   Cardiac Device Check - Inpatient   Result Value    Date Time Interrogation Session [id number]    Implantable Pulse Generator  Medtronic    Implantable Pulse Generator Model [id number] Evera XT     Implantable Pulse Generator Serial Number [id number]    Type Interrogation Session In Clinic    Clinic Name [facility]    Implantable Pulse Generator Type Defibrillator    Implantable Pulse Generator Implant Date 20180412    Implantable Lead  Medtronic    Implantable Lead Model 5076 CapSureFix Novus    Implantable Lead Serial Number [id number]    Implantable Lead Implant Date 20060306    Implantable Lead Polarity Type  Bipolar Lead    Implantable Lead Location Detail 1 UNKNOWN    Implantable Lead Location Right Atrium    Implantable Lead  Medtronic    Implantable Lead Model 6949 Fidel Sal    Implantable Lead Serial Number [id number]    Implantable Lead Implant Date 20060306    Implantable Lead Polarity Type Bipolar Lead    Implantable Lead Location Detail 1 UNKNOWN    Implantable Lead Location Right Ventricle    Marcos Setting Mode (NBG Code) MVP_AAI_DDD    Marcos Setting Lower Rate Limit 50    Marcos Setting Maximum Tracking Rate 130    Marcos Setting Maximum Sensor Rate 115    Marcos Setting Hysterisis Rate DISABLED    Marcos Setting TRISTEN Delay Low 350    Marcos Setting PAV Delay Low 350    Marcos Setting AT Mode Switch Rate 171    Lead Channel Setting Sensing Polarity Bipolar    Lead Channel Setting Sensing Anode Location Right Atrium    Lead Channel Setting Sensing Anode Terminal Ring    Lead Channel Setting Sensing Cathode Location Right Atrium    Lead Channel Setting Sensing Cathode Terminal Tip    Lead Channel Setting Sensing Sensitivity 0.3    Lead Channel Setting Sensing Polarity Bipolar    Lead Channel Setting Sensing Anode Location Right Ventricle    Lead Channel Setting Sensing Anode Terminal Ring    Lead Channel Setting Sensing Cathode Location Right Ventricle    Lead Channel Setting Sensing Cathode Terminal Tip    Lead Channel Setting Sensing Sensitivity 0.3    Lead Channel Setting Pacing Polarity Bipolar    Lead Channel Setting Pacing Anode Location Right Atrium    Lead Channel Setting Pacing Anode Terminal Ring    Lead Channel Setting Sensing Cathode Location Right Atrium    Lead Channel Setting Sensing Cathode Terminal Tip    Lead Channel Setting Pacing Pulse Width 0.4    Lead Channel Setting Pacing Amplitude 1.5    Lead Channel Setting Pacing Capture Mode Adaptive    Lead Channel Setting Pacing Polarity Bipolar    Lead Channel Setting Pacing Anode Location Right Ventricle    Lead Channel Setting Pacing  Anode Terminal Ring    Lead Channel Setting Sensing Cathode Location Right Ventricle    Lead Channel Setting Sensing Cathode Terminal Tip    Lead Channel Setting Pacing Pulse Width 1    Lead Channel Setting Pacing Amplitude 5    Lead Channel Setting Pacing Capture Mode Adaptive    Zone Setting Type Category VF    Zone Setting Detection Interval 320    Zone Setting Detection Beats Numerator 30    Zone Setting Detection Beats Denominator 40    Zone Setting Type Category VT    Zone Setting Detection Interval 280    Zone Setting Type Category VT    Zone Setting Detection Interval 350    Zone Setting Type Category VT    Zone Setting Detection Interval 400    Zone Setting Type Category ATRIAL_FIBRILLATION    Zone Setting Type Category AT/AF    Zone Setting Detection Interval 350    Lead Channel Impedance Value 304    Lead Channel Sensing Intrinsic Amplitude 0.875    Lead Channel Sensing Intrinsic Amplitude 1.5    Lead Channel Pacing Threshold Amplitude 0.75    Lead Channel Pacing Threshold Pulse Width 0.4    Lead Channel Impedance Value 703    Lead Channel Impedance Value 608    Lead Channel Sensing Intrinsic Amplitude 0.875    Lead Channel Sensing Intrinsic Amplitude 1.125    Lead Channel Pacing Threshold Amplitude 2.5    Lead Channel Pacing Threshold Pulse Width 0.4    Battery Date Time of Measurements 52225561483197    Battery Status OK    Battery RRT Trigger 2.727    Battery Remaining Longevity 63    Battery Voltage 2.96    Capacitor Charge Type Reformation    Capacitor Last Charge Date Time 61164452167879    Capacitor Charge Time 3.973    Capacitor Charge Energy 18    Marcos Statistic Date Time Start 50937587879580    Marcos Statistic Date Time End 90816805781266    Marcos Statistic RA Percent Paced 0.1    Marcos Statistic RV Percent Paced 0.1    Marcos Statistic AP  Percent 0.02    Marcos Statistic AS  Percent 0.08    Marcos Statistic AP VS Percent 0.08    Marcos Statistic AS VS Percent 99.82    Atrial Tachy Statistic  Date Time Start 18723269580357    Atrial Tachy Statistic Date Time End 65070690599538    Atrial Tachy Statistic AT/AF [place] Percent 3.1    Therapy Statistic Recent Shocks Delivered 0    Therapy Statistic Recent Shocks Aborted 0    Therapy Statistic Recent ATP Delivered 0    Therapy Statistic Recent Date Time Start 90295247426705    Therapy Statistic Recent Date Time End 26042479604830    Therapy Statistic Total Shocks Delivered 1    Therapy Statistic Total Shocks Aborted 0    Therapy Statistic Total ATP Delivered 0    Therapy Statistic Total  Date Time Start 57649595226455    Therapy Statistic Total  Date Time End 21379741038534    Episode Statistic Recent Count 28    Episode Statistic Type Category AT/AF    Episode Statistic Recent Count 0    Episode Statistic Type Category SVT    Episode Statistic Recent Count 0    Episode Statistic Type Category VT    Episode Statistic Recent Count 0    Episode Statistic Type Category VF    Episode Statistic Recent Count 0    Episode Statistic Type Category VT    Episode Statistic Recent Count 0    Episode Statistic Type Category VT    Episode Statistic Recent Count 0    Episode Statistic Type Category VT    Episode Statistic Recent Date Time Start 96727934506222    Episode Statistic Recent Date Time End 85928863287155    Episode Statistic Recent Date Time Start 31102573564581    Episode Statistic Recent Date Time End 69212288742333    Episode Statistic Recent Date Time Start 99129132271862    Episode Statistic Recent Date Time End 42647015760582    Episode Statistic Recent Date Time Start 92936738236725    Episode Statistic Recent Date Time End 18342305857775    Episode Statistic Recent Date Time Start 15138140791025    Episode Statistic Recent Date Time End 26043032545521    Episode Statistic Recent Date Time Start 25163135738296    Episode Statistic Recent Date Time End 33482080001341    Episode Statistic Recent Date Time Start 06700464416795    Episode Statistic Recent Date  Time End 16012873219918    Episode Statistic Total Count 33    Episode Statistic Type Category AT/AF    Episode Statistic Total Count 0    Episode Statistic Type Category SVT    Episode Statistic Total Count 9    Episode Statistic Type Category VT    Episode Statistic Total Count 1    Episode Statistic Type Category VF    Episode Statistic Total Count 0    Episode Statistic Type Category VT    Episode Statistic Total Count 0    Episode Statistic Type Category VT    Episode Statistic Total Count 1    Episode Statistic Type Category VT    Episode Statistic Total Date Time Start 79633460897699    Episode Statistic Total Date Time End 71055478573854    Episode Statistic Total Date Time Start 71284288454508    Episode Statistic Total Date Time End 55542923819429    Episode Statistic Total Date Time Start 52357853492009    Episode Statistic Total Date Time End 37797833731820    Episode Statistic Total Date Time Start 34540440177195    Episode Statistic Total Date Time End 59401464526886    Episode Statistic Total Date Time Start 99620439016778    Episode Statistic Total Date Time End 22325819325193    Episode Statistic Total Date Time Start 98814077320505    Episode Statistic Total Date Time End 95432731116441    Episode Statistic Total Date Time Start 12585957144107    Episode Statistic Total Date Time End 07202998505248    Episode Identifier 56    Episode Type Category Monitor    Episode Date Time [id number]    Episode Duration 61    Episode Identifier 55    Episode Type Category Monitor    Episode Date Time 72815062335904    Episode Duration 144    Episode Identifier 54    Episode Type Category Monitor    Episode Date Time [id number]    Episode Duration 93    Episode Identifier 53    Episode Type Category Monitor    Episode Date Time 23357212067438    Episode Duration 214    Episode Identifier 52    Episode Type Category Monitor    Episode Date Time [id number]    Episode Duration 309    Episode Identifier  51    Episode Type Category Monitor    Episode Date Time 97791547203595    Episode Duration 316    Episode Identifier 50    Episode Type Category Monitor    Episode Date Time 18888114012022    Episode Duration 180    Episode Identifier 49    Episode Type Category Monitor    Episode Date Time 44795494427023    Episode Duration 597    Episode Identifier 48    Episode Type Category Monitor    Episode Date Time 43229182479796    Episode Duration 102    Episode Identifier 47    Episode Type Category Monitor    Episode Date Time [id number]    Episode Duration 970    Episode Identifier 46    Episode Type Category Monitor    Episode Date Time [id number]    Episode Duration 350    Episode Identifier 45    Episode Type Category Monitor    Episode Date Time [id number]    Episode Duration 351    Episode Identifier 44    Episode Type Category Monitor    Episode Date Time 80439207826245    Episode Duration 102    Episode Identifier 43    Episode Type Category Monitor    Episode Date Time 16272417083500    Episode Duration 353    Episode Identifier 42    Episode Type Category Monitor    Episode Date Time [id number]    Episode Duration 328    Episode Identifier 41    Episode Type Category Monitor    Episode Date Time 35276129599193    Episode Duration 152    Episode Identifier 40    Episode Type Category Monitor    Episode Date Time [id number]    Episode Duration 235    Episode Identifier 39    Episode Type Category Monitor    Episode Date Time [id number]    Episode Duration 296    Episode Identifier 38    Episode Type Category Monitor    Episode Date Time [id number]    Episode Duration 203    Episode Identifier 37    Episode Type Category Monitor    Episode Date Time [id number]    Episode Duration 182    Episode Identifier 36    Episode Type Category Monitor    Episode Date Time [id number]    Episode Duration 361    Episode Identifier 35    Episode Type Category Monitor    Episode Date Time  [id number]    Episode Duration 297    Episode Identifier 34    Episode Type Category Monitor    Episode Date Time 62000502645818    Episode Duration 99    Episode Identifier 33    Episode Type Category Monitor    Episode Date Time [id number]    Episode Duration 228    Episode Identifier 32    Episode Type Category Monitor    Episode Date Time [id number]    Episode Duration 278    Episode Identifier 31    Episode Type Category Monitor    Episode Date Time 66011789457580    Episode Duration 186    Episode Identifier 30    Episode Type Category Monitor    Episode Date Time [id number]    Episode Duration 378    Episode Identifier 29    Episode Type Category Monitor    Episode Date Time [id number]    Episode Duration 322    Narrative    Patient seen in OR #27 pre-operatively for evaluation and iterative programming of their ICD per MD orders.     Device: MedaCon [id number] Evera XT DR  Normal device function  Mode: AAI>DDD  bpm  AP: <0.1%  : <0.1%  Intrinsic rhythm:  bpm  Thoracic Impedance: Below the reference line suggesting possible intrathoracic fluid accumulation.  Short V-V intervals: 0  Lead Trends Appear Stable: yes  Estimated battery longevity to RRT = 5.2 years  Atrial Arrhythmia: 28 AT/AF episodes recorded - 1 min 1 sec - 16 min 10 sec.  AF [place]: 3.1%  Anticoagulant:   Ventricular Arrhythmia: 0  Setting Changes: VY detection, FVT detection and VT monitor zones programmed off.     TRINA Amador RN    Dual lead ICD    I have reviewed and interpreted the device interrogation, settings, programming and nurse's summary. The device is functioning within normal device parameters. I agree with the current findings, assessment and plan.   XR Chest Port 1 View    Narrative    EXAM: TEMPORARY  7/11/2022 5:37 PM     HISTORY:  Chest closure, intraoperative       COMPARISON:  7/11/2022    FINDINGS  Technique: Semiupright AP view of the chest.     Devices: Single view of the chest. The  endotracheal tube tip projects  over the midthoracic trachea. Gastric tube passes below the left  hemidiaphragm. Stable LVAD and left chest wall cardiac device with  atrial and ventricular leads. Right upper extremity PICC tip is  obscured in the region of the high SVC. Stable positioning of right IJ  RVAD with tip in the pulmonary artery. Mediastinal drains and left  basilar chest tube. Chest is open with surgical sponges projected over  the mediastinum.    Unchanged cardiomegaly. Small bilateral pleural effusions. No  discernible pneumothorax.       Impression    IMPRESSION:   No unexpected retained foreign body. Remaining support devices stable  in position.    Results reported to LITA Camp, OR circulator 7/11/2022 1749 hours    I have personally reviewed the examination and initial interpretation  and I agree with the findings.    KASI EAST MD         SYSTEM ID:  [id number]   XR Abdomen Port 1 View    Narrative    XR ABDOMEN PORT 1 VIEWS  7/11/2022 6:56 PM      HISTORY: post chest closure and verification of lines    COMPARISON: 7/11/2022, 7/3/2022    FINDINGS:   Single AP view of the abdomen. Stable thoracic findings. Stable IVC  filter. Feeding tube tip overlying the proximal jejunum with residual  enteric contrast over the stomach. Relative paucity of bowel gas. No  definite pneumatosis or portal venous gas.      Impression    IMPRESSION:   Feeding tube tip overlying the proximal jejunum.    I have personally reviewed the examination and initial interpretation  and I agree with the findings.    KASI EAST MD         SYSTEM ID:  [id number]   XR Chest Port 1 View    Narrative    EXAM: XR CHEST PORT 1 VIEW  7/12/2022 1:15 AM     HISTORY:  RVAD/LVAD/ETT       COMPARISON:  7/11/2022    FINDINGS: Single view of the chest. Postsurgical changes of the chest  with median sternotomy wires. Endotracheal tube tip projects over the  mid thoracic trachea. Feeding tube courses below the diaphragm and  beyond the  field-of-view. The tip is at least to the body of the  stomach. Right upper extremity PICC tip is obscured in the region of  mid SVC. Right IJ RVAD with tip over the distal main pulmonary trunk.  Stable left chest wall cardiac device and LVAD. Stable mediastinal  drains and left basilar chest tube.     Stable enlarged cardiac silhouette. Small bilateral pleural effusions.  No appreciable pneumothorax. Similar perihilar and bibasilar  opacities.      Impression    IMPRESSION:   1. Stable support devices.  2. Stable small pleural effusions and bilateral pulmonary opacities.    I have personally reviewed the examination and initial interpretation  and I agree with the findings.    LAI HERNADEZ MD         SYSTEM ID:  [id number]   Cardiac Device Check - Inpatient   Result Value    Date Time Interrogation Session [id number]    Implantable Pulse Generator  Medtronic    Implantable Pulse Generator Model [id number] Eliseo TIRADO DR    Implantable Pulse Generator Serial Number [id number]    Type Interrogation Session In Clinic    Clinic Name [facility]    Implantable Pulse Generator Type Defibrillator    Implantable Pulse Generator Implant Date 20180412    Implantable Lead  Medtronic    Implantable Lead Model 5076 CapSureFix Novus    Implantable Lead Serial Number [id number]    Implantable Lead Implant Date 20060306    Implantable Lead Polarity Type Bipolar Lead    Implantable Lead Location Detail 1 UNKNOWN    Implantable Lead Location Right Atrium    Implantable Lead  Medtronic    Implantable Lead Model 6949 Sprint Doron    Implantable Lead Serial Number [id number]    Implantable Lead Implant Date 20060306    Implantable Lead Polarity Type Bipolar Lead    Implantable Lead Location Detail 1 UNKNOWN    Implantable Lead Location Right Ventricle    Marcos Setting Mode (NBG Code) MVP_AAI_DDD    Marcos Setting Lower Rate Limit 50    Marcos Setting Maximum Tracking Rate 130     Marcos Setting Maximum Sensor Rate 115    Marcos Setting Hysterisis Rate DISABLED    Marcos Setting TRISTEN Delay Low 350    Marcos Setting PAV Delay Low 350    Marcos Setting AT Mode Switch Rate 171    Lead Channel Setting Sensing Polarity Bipolar    Lead Channel Setting Sensing Anode Location Right Atrium    Lead Channel Setting Sensing Anode Terminal Ring    Lead Channel Setting Sensing Cathode Location Right Atrium    Lead Channel Setting Sensing Cathode Terminal Tip    Lead Channel Setting Sensing Sensitivity 0.3    Lead Channel Setting Sensing Polarity Bipolar    Lead Channel Setting Sensing Anode Location Right Ventricle    Lead Channel Setting Sensing Anode Terminal Ring    Lead Channel Setting Sensing Cathode Location Right Ventricle    Lead Channel Setting Sensing Cathode Terminal Tip    Lead Channel Setting Sensing Sensitivity 0.3    Lead Channel Setting Pacing Polarity Bipolar    Lead Channel Setting Pacing Anode Location Right Atrium    Lead Channel Setting Pacing Anode Terminal Ring    Lead Channel Setting Sensing Cathode Location Right Atrium    Lead Channel Setting Sensing Cathode Terminal Tip    Lead Channel Setting Pacing Pulse Width 0.4    Lead Channel Setting Pacing Amplitude 1.5    Lead Channel Setting Pacing Capture Mode Adaptive    Lead Channel Setting Pacing Polarity Bipolar    Lead Channel Setting Pacing Anode Location Right Ventricle    Lead Channel Setting Pacing Anode Terminal Ring    Lead Channel Setting Sensing Cathode Location Right Ventricle    Lead Channel Setting Sensing Cathode Terminal Tip    Lead Channel Setting Pacing Pulse Width 1    Lead Channel Setting Pacing Amplitude 5    Lead Channel Setting Pacing Capture Mode Adaptive    Zone Setting Type Category VF    Zone Setting Detection Interval 320    Zone Setting Detection Beats Numerator 30    Zone Setting Detection Beats Denominator 40    Zone Setting Type Category VT    Zone Setting Detection Interval 280    Zone Setting Type Category  VT    Zone Setting Detection Interval 350    Zone Setting Type Category VT    Zone Setting Detection Interval 400    Zone Setting Type Category ATRIAL_FIBRILLATION    Zone Setting Type Category AT/AF    Zone Setting Detection Interval 350    Lead Channel Impedance Value 285    Lead Channel Sensing Intrinsic Amplitude 0.625    Lead Channel Pacing Threshold Amplitude 0.75    Lead Channel Pacing Threshold Pulse Width 0.4    Lead Channel Impedance Value 665    Lead Channel Impedance Value 551    Lead Channel Sensing Intrinsic Amplitude 0.75    Lead Channel Pacing Threshold Amplitude 2.75    Lead Channel Pacing Threshold Pulse Width 1    Battery Date Time of Measurements 20220712093725    Battery Status OK    Battery RRT Trigger 2.727    Battery Remaining Longevity 63    Battery Voltage 2.94    Capacitor Charge Type Reformation    Capacitor Last Charge Date Time 75593479718360    Capacitor Charge Time 3.973    Capacitor Charge Energy 18    Marcos Statistic Date Time Start 93680882062813    Marcos Statistic Date Time End 20220712093538    Marcos Statistic RA Percent Paced 0    Marcos Statistic RV Percent Paced 0    Marcos Statistic AP  Percent 0    Marcos Statistic AS  Percent 0    Marcos Statistic AP VS Percent 0    Marcos Statistic AS VS Percent 100    Atrial Tachy Statistic Date Time Start 32600143482724    Atrial Tachy Statistic Date Time End 39100676629218    Atrial Tachy Statistic AT/AF [place] Percent 0    Therapy Statistic Recent Shocks Delivered 0    Therapy Statistic Recent Shocks Aborted 0    Therapy Statistic Recent ATP Delivered 0    Therapy Statistic Recent Date Time Start 34463181122001    Therapy Statistic Recent Date Time End 56740422647892    Therapy Statistic Total Shocks Delivered 1    Therapy Statistic Total Shocks Aborted 0    Therapy Statistic Total ATP Delivered 0    Therapy Statistic Total  Date Time Start 33663095059959    Therapy Statistic Total  Date Time End 59386569765203    Episode Statistic  Recent Count 0    Episode Statistic Type Category AT/AF    Episode Statistic Recent Count 0    Episode Statistic Type Category SVT    Episode Statistic Recent Count 0    Episode Statistic Type Category VT    Episode Statistic Recent Count 0    Episode Statistic Type Category VF    Episode Statistic Recent Count 0    Episode Statistic Type Category VT    Episode Statistic Recent Count 0    Episode Statistic Type Category VT    Episode Statistic Recent Count 0    Episode Statistic Type Category VT    Episode Statistic Recent Date Time Start 71276187632801    Episode Statistic Recent Date Time End 71755779207725    Episode Statistic Recent Date Time Start 20739834354149    Episode Statistic Recent Date Time End 14659142883527    Episode Statistic Recent Date Time Start 09711748778539    Episode Statistic Recent Date Time End 84632153002853    Episode Statistic Recent Date Time Start 25659528912427    Episode Statistic Recent Date Time End 39312507238651    Episode Statistic Recent Date Time Start 64318749508572    Episode Statistic Recent Date Time End 62824014502705    Episode Statistic Recent Date Time Start 03642451735046    Episode Statistic Recent Date Time End 60641401708715    Episode Statistic Recent Date Time Start 53688323946507    Episode Statistic Recent Date Time End 84350540450014    Episode Statistic Total Count 33    Episode Statistic Type Category AT/AF    Episode Statistic Total Count 0    Episode Statistic Type Category SVT    Episode Statistic Total Count 9    Episode Statistic Type Category VT    Episode Statistic Total Count 1    Episode Statistic Type Category VF    Episode Statistic Total Count 0    Episode Statistic Type Category VT    Episode Statistic Total Count 0    Episode Statistic Type Category VT    Episode Statistic Total Count 1    Episode Statistic Type Category VT    Episode Statistic Total Date Time Start 15197165472414    Episode Statistic Total Date Time End 43602916415331     Episode Statistic Total Date Time Start 30572134978068    Episode Statistic Total Date Time End 20220712093538    Episode Statistic Total Date Time Start 20180412151624    Episode Statistic Total Date Time End 20220712093538    Episode Statistic Total Date Time Start 20180412151624    Episode Statistic Total Date Time End 20220712093538    Episode Statistic Total Date Time Start 13580920971473    Episode Statistic Total Date Time End 20220712093538    Episode Statistic Total Date Time Start 20180412151624    Episode Statistic Total Date Time End 20220712093538    Episode Statistic Total Date Time Start 20180412151624    Episode Statistic Total Date Time End 20220712093538    Narrative    Patient seen on [facility] 4E for evaluation and iterative programming of their ICD per MD orders, post-op chest washout and closure.     Device: [employer] [id number] Evera XT DR  Normal device function. R Waves have drastically changed, today measuring 0.8 mV. RV threshold has also risen to 2.75 V @ 1 ms. RV impedance shows an increase in unipolar and unipolar.  Mode: AAI-DDD  bpm  AP: 0%  : 0%  Intrinsic Rhythm:  bpm  Short V-V intervals: 0  Lead Trends Appear Stable.   Estimated battery longevity to RRT = 5.2 years. Battery voltage = 2.95 V.   No Arrhythmias Recorded as Detection was ooff for surgery.  Setting Changes: ICD Tachy Detection and Therapies Turned ON.    Plan: Follow pt PRN while hospitalized  JESU Cueva RN    Dual lead ICD    I have reviewed and interpreted the device interrogation, settings, programming and nurse's summary. The device is functioning within normal device parameters. I agree with the current findings, assessment and plan.         6. LINES/DRAINS/AIRWAY    Peripheral IV 07/05/22 Left;Anterior Upper forearm (Active)   Site Assessment WDL 07/13/22 0400   Line Status Saline locked;Checked every 1-2 hour 07/13/22 0400   Dressing Intervention New dressing  07/12/22 0000   Phlebitis Scale 0-->no  symptoms 07/13/22 0400   Infiltration Scale 0 07/13/22 0400   If infiltrated, was a vesicant infusing? No 07/11/22 1800   Number of days: 8       PICC Triple Lumen 06/17/22 Right Brachial vein medial (Active)   Site Assessment WDL 07/13/22 0400   Dressing Intervention Chlorhexidine patch;Transparent 07/12/22 1600   Dressing Change Due 07/17/22 07/13/22 0400   PICC Comment from OSH 06/17/22 1700   Osborne - Status infusing 07/13/22 0400   Osborne - Cap Change Due 07/15/22 07/13/22 0400   Red - Status infusing 07/13/22 0400   Red - Cap Change Due 07/15/22 07/13/22 0400   White - Status saline locked 07/13/22 0400   White - Cap Change Due 07/15/22 07/13/22 0400   Extravasation? No 07/13/22 0400   Line Necessity Yes, meets criteria 07/13/22 0400   Number of days: 26       Introducer 07/08/22 Femoral Left (Active)   Specific Qualities Capped 07/13/22 0400   (Retired) Dressing Status Clean, dry, intact 07/13/22 0400   Dressing Intervention Dressing changed 07/10/22 0000   Dressing Change Due 07/17/22 07/13/22 0400   Number of days: 5       Arterial Line 07/08/22 Femoral (Active)   Site Assessment WDL 07/13/22 0400   Line Status Pulsatile blood flow 07/13/22 0400   Arterine Line Cap Change Due 07/17/22 07/13/22 0400   Art Line Waveform Appropriate 07/13/22 0400   Art Line Interventions Leveled;Connections checked and tightened;Flushed per protocol 07/13/22 0400   Color/Movement/Sensation Capillary refill less than 3 sec 07/13/22 0400   Line Necessity Yes, meets criteria 07/13/22 0400   Dressing Type Transparent 07/13/22 0400   Dressing Status Clean, dry, intact 07/13/22 0400   Dressing Intervention Dressing changed/new dressing 07/10/22 0000   Dressing Change Due 07/17/22 07/13/22 0400   Number of days: 5       Arterial Line 07/08/22 Radial (Active)   Site Assessment UTV;Bleeding 07/13/22 0400   Line Status Pulsatile blood flow 07/13/22 0400   Arterine Line Cap Change Due 07/15/22 07/13/22 0400   Art Line Waveform Appropriate  07/13/22 0400   Art Line Interventions Leveled;Connections checked and tightened;Flushed per protocol 07/13/22 0400   Color/Movement/Sensation Capillary refill less than 3 sec 07/13/22 0400   Line Necessity Yes, meets criteria 07/13/22 0400   Dressing Type Gauze;Transparent 07/13/22 0400   Dressing Status Moist drainage 07/13/22 0400   Dressing Intervention Dressing changed/new dressing 07/13/22 0000   Dressing Change Due 07/17/22 07/13/22 0400   Number of days: 5       Venous Sheath 07/08/22 Other (comment) Right (Active)   Specific Qualities Sutured;Left in Place 07/13/22 0400   Site Assessment UTV 07/13/22 0400   Dressing Type Other (Comment) 07/13/22 0400   Dressing Intervention Dressing reinforced 07/13/22 0400   Venous Sheath Comment Protek Duo 07/13/22 0400   Number of days: 5       CVC Double Lumen Left Femoral (Active)   Site Assessment WDL 07/13/22 0400   Dressing Type Chlorhexidine disk;Transparent 07/13/22 0400   Dressing Status clean;dry;intact 07/13/22 0400   Dressing Intervention dressing changed 07/10/22 0000   Dressing Change Due 07/17/22 07/13/22 0400   Line Necessity yes, meets criteria 07/13/22 0400   Brown - Status saline locked 07/13/22 0400   Brown - Cap Change Due 07/15/22 07/13/22 0400   White - Status saline locked 07/13/22 0400   White - Cap Change Due 07/15/22 07/13/22 0400   Phlebitis Scale 0-->no symptoms 07/13/22 0400   Infiltration? no 07/13/22 0400   Infiltration Scale 0 07/13/22 0400   CVC Comment MAC 07/13/22 0400   Number of days: 5       ETT Cuffed Single 8 mm (Active)   Secured at (cm) 24 cm 07/13/22 0400   Measured from Lips 07/13/22 0400   Position Left 07/13/22 0600   Secured by Commercial tube partida 07/13/22 0400   Bite Block None Present 07/13/22 0600   Site Appearance Clean;Dry 07/13/22 0400   Tube Care Site care done 07/13/22 0326   Cuff Assessment Minimal leak technique 07/13/22 0326   Safety Measures Manual resuscitator at bedside 07/13/22 0400   Number of days: 5        Chest Tube 1 Anterior Mediastinal 9 [place] (Active)   Site Assessment UTV 07/13/22 0400   Suction -20 cm H2O 07/13/22 0400   Chest Tube Airleak No 07/13/22 0400   Drainage Description Sanguinous 07/13/22 0400   Dressing Status Normal: Clean, Dry & Intact 07/13/22 0400   Dressing Change Due 07/14/22 07/13/22 0400   Dressing Intervention Gauze 07/13/22 0400   Patency Intervention Tip/Tilt;Stripped 07/13/22 0400   Chest Tube Clamps at Bedside present 07/13/22 0400   Container Amount 1400 07/13/22 0600   Output (ml) 10 ml 07/13/22 0600   Number of days: 5       Chest Tube 2 Anterior Mediastinal 9 [place] (Active)   Site Assessment UTV 07/13/22 0400   Suction -20 cm H2O 07/13/22 0400   Chest Tube Airleak No 07/13/22 0400   Drainage Description Sanguinous 07/13/22 0400   Dressing Status Normal: Clean, Dry & Intact 07/13/22 0400   Dressing Change Due 07/14/22 07/13/22 0400   Dressing Intervention Gauze 07/13/22 0400   Patency Intervention Tip/Tilt;Stripped 07/13/22 0400   Chest Tube Clamps at Bedside present 07/13/22 0400   Number of days: 5       Chest Tube 3 Pleural 32 [place] (Active)   Site Assessment UTV 07/13/22 0400   Suction -20 cm H2O 07/13/22 0400   Chest Tube Airleak No 07/13/22 0400   Drainage Description Sanguinous 07/13/22 0400   Dressing Status Normal: Clean, Dry & Intact 07/13/22 0400   Dressing Change Due 07/14/22 07/13/22 0400   Dressing Intervention Gauze 07/13/22 0400   Patency Intervention Tip/Tilt;Stripped 07/13/22 0400   Chest Tube Clamps at Bedside present 07/13/22 0400   Container Amount 1040 07/13/22 0600   Output (ml) 10 ml 07/13/22 0600   Number of days: 5       Chest Tube 4 Posterior Pericardial 24 [place] (Active)   Site Assessment UTV 07/13/22 0400   Suction -20 cm H2O 07/13/22 0400   Chest Tube Airleak No 07/13/22 0400   Drainage Description Sanguinous 07/13/22 0400   Dressing Status Normal: Clean, Dry & Intact 07/13/22 0400   Dressing Change Due 07/14/22 07/13/22 0400   Dressing Intervention  Gauze 07/13/22 0400   Patency Intervention Stripped;Tip/Tilt 07/13/22 0400   Chest Tube Clamps at Bedside present 07/13/22 0400   Number of days: 5       Chest Tube 3 Anterior Mediastinal 28 [employer] (Active)   Site Assessment UTV 07/13/22 0400   Suction -20 cm H2O 07/13/22 0400   Chest Tube Airleak No 07/13/22 0400   Drainage Description Sanguinous 07/13/22 0400   Dressing Status Normal: Clean, Dry & Intact 07/13/22 0400   Dressing Change Due 07/14/22 07/13/22 0400   Dressing Intervention Gauze 07/13/22 0400   Patency Intervention Stripped;Tip/Tilt 07/13/22 0400   Chest Tube Clamps at Bedside present 07/13/22 0400   Container Amount 530 07/13/22 0600   Output (ml) 10 ml 07/13/22 0600   Number of days: 2       NG/OG/NJ Tube Nasojejunal Right nostril (Active)   Site Description WDL 07/13/22 0400   Status Enteral Feedings 07/13/22 0400   Placement Confirmation Aragon unchanged 07/13/22 0400   Aragon (cm marking) at nare/mouth 113 cm 07/13/22 0400   Intake (ml) 35 ml 07/13/22 0000   Flush/Free Water (mL) 30 mL 07/13/22 0400   Number of days: 2       NG/OG/NJ Tube Orogastric Right mouth (Active)   Site Description WDL 07/13/22 0400   Status Suction-low intermittent 07/13/22 0400   Drainage Appearance Brown 07/12/22 2000   Placement Confirmation Aragon unchanged 07/13/22 0400   Aragon (cm marking) at nare/mouth 65 cm 07/13/22 0400   Flush/Free Water (mL) 30 mL 07/13/22 0000   Container Amount 300 mL 07/13/22 0400   Output (ml) 0 ml 07/13/22 0400   Number of days: 1       Rectal Tube With balloon (Active)   Number of days: 0       Urethral Catheter 07/08/22 Coude;Latex;Temperature probe;Triple-lumen 16 fr (Active)   Tube Description Positional 07/13/22 0400   Catheter Care Done;Catheter wipes 07/13/22 0000   Collection Container Standard 07/13/22 0400   Securement Method Securing device (Describe) 07/13/22 0400   Rationale for Continued Use Strict 1-2 Hour I&O 07/13/22 0400   Urine Output 42 mL 07/13/22 0600    Number of days: 5       Wound Face Pressure injury hospital acquired Stage 1 (Active)   Wound Bed Erythema, non-blanchable, skin intact 07/13/22 0400   Drainage Amount None 07/13/22 0400   Dressing Foam 07/13/22 0400   Number of days: 0       Incision/Surgical Site 06/23/22 Right Chest (Active)   Incision Assessment WDL 07/13/22 0400   Deirdre-Incision Assessment UTV 07/12/22 0800   Closure Liquid bandage;Approximated 07/13/22 0400   Incision Drainage Amount UTV 07/11/22 1200   Drainage Description UTV 07/11/22 1200   Incision Care Wound cleanser 07/13/22 0400   Dressing Intervention Clean, dry, intact 07/13/22 0400   Number of days: 20       Incision/Surgical Site 07/08/22 Midline Chest (Active)   Incision Assessment UTV 07/13/22 0400   Deirdre-Incision Assessment UTV 07/12/22 0800   Closure Other (Comment) 07/13/22 0400   Incision Drainage Amount Scant 07/11/22 1200   Drainage Description Sanguinous 07/11/22 0400   Incision Care Other (Comment) 07/10/22 0400   Dressing Intervention Clean, dry, intact 07/13/22 0400   Number of days: 5          ====================================================

## 2022-07-13 NOTE — PLAN OF CARE
Neuro: Pt sedated on Propofol and Fentanyl. Pupils equal and reactive. Pt opens eyes to painful stimuli. Pt withdraws to pain in all extremities. Initially, pt on less sedation moving all extremities but not to command. Low temperature of 93.9 - warm blankets applied, room temperature increased, and jose hugger applied.     Cardiac: SR. Hr 80s-90s. MAPs>65.  LVAD and RVAD without alarms.       Respiratory: On CMV (RR 12, , Peep 5) at 30%. Lung sounds clear/diminished. Small amount of red/brown, thick secretions present orally. Small amount of thick, cloudy secretions present in ETT.     GI/: NJ infusing with tube feeding. OG to LIS. Loose stool x2 - rectal tube placed. Urine output 15-25ml/hr.      Incisons: Chest dressing CDI. Mediastinal chest tube x2 with 10-30ml/hr of sanguinous output present. Pleural & pericardial chest tubes with 0-10m/hr of sanguinous output present. Mediastinal chest tube x1 with 0-10ml/hr of sanguinous output present.      Lines: PIVx1. R PICCx3. L groin CVC x2. L groin arterial line. R radial arterial line - monitoring BP from this line.      Intervention: Pt to OR at ~2200 and returned to 4E at ~2330. At 2200, pt paced at 60 - MD notified, Precedex gtt stopped; pt paced at 60 until ~0200 when pt returned to SR. At 2350, platelets = 67; MD notified, 2 units platelets given.      Plan: Continue to monitor and update MD as needed. Continue plan of care.      Daughter called RN for update at ~2000 and ~0630.       Goal Outcome Evaluation:  Plan of Care Reviewed With: patient   Overall Patient Progress: declining

## 2022-07-13 NOTE — ANESTHESIA PREPROCEDURE EVALUATION
Anesthesia Pre-Procedure Evaluation    Patient: Dandy Sands   MRN: [id number] : 1960        Procedure : Procedure(s):  IRRIGATION AND DEBRIDEMENT, CHEST          61 year old male with a PMH of CAD s/p PCI to LAD, MI, ICM, acute on chronic heart failure, s/p CRT-D, severe MR, antiphospholipid antibody syndrome on chronic warfarin, SLE, HTN, HLD, recent hospitalization - s/p RCA, LAD stenting who underwent RVAD (29F Protek duo RIJ) LVAD placement (HeartMate 3) on 22 by Dr. Zamora. Intraoperative course complicated by IABP dysfunction and RV failure, requiring crash onto bypass / vasopressor support, NO, and protek placement. Now s/p chest closure and NO wean . Return to OR for hemopericardium overnight and chest felt open. Closure planned .       No past medical history on file.   Past Surgical History:   Procedure Laterality Date     COLONOSCOPY N/A 2022    Procedure: COLONOSCOPY;  Surgeon: Parth Meneses MD;  Location: [facility] OR     CV CORONARY ANGIOGRAM N/A 2022    Procedure: Coronary Angiogram;  Surgeon: Mike Pope MD;  Location:  [facility] CARD[facility] CATH LAB     CV CORONARY ANGIOGRAM N/A 2022    Procedure: Coronary Angiogram;  Surgeon: Austin Bright MD;  Location:  [facility]ATH [facility]     CV CORONARY LITHOTRIPSY PCI Bilateral 2022    Procedure: Percutaneous Coronary Intervention - Lithotripsy;  Surgeon: Mike Pope MD;  Location:  [facility] CARD[facility] CATH LAB     CV INTRA AORTIC BALLOON N/A 2022    Procedure: Intraprocedure Aortic Balloon Pump Insertion;  Surgeon: Sherman Cerda MD;  Location:  HEART CARDIAC CATH LAB     CV INTRA AORTIC BALLOON Right 7/3/2022    Procedure: Reposition of Subclavian Intraprocedure Aortic Balloon Pump;  Surgeon: Austin Bright MD;  Location: [facility] CARDIAC CATH LAB     CV INTRAVASULAR ULTRASOUND N/A 2022    Procedure: Intravascular Ultrasound;  Surgeon: Mike Pope MD;   Location:  HEART CARDIAC CATH LAB     CV PCI ANGIOPLASTY N/A 5/29/2022    Procedure: Percutaneous Transluminal Angioplasty;  Surgeon: Austin Bright MD;  Location:  HEART CARDIAC CATH LAB     CV PCI STENT DRUG ELUTING N/A 5/24/2022    Procedure: Percutaneous Coronary Intervention Stent;  Surgeon: Mike Pope MD;  Location:  HEART CARDIAC CATH LAB     CV RIGHT HEART CATH MEASUREMENTS RECORDED N/A 05/18/2022    Procedure: Right Heart Catheterization;  Surgeon: Justo Stewart MD;  Location:  HEART CARDIAC CATH LAB     CV RIGHT HEART CATH MEASUREMENTS RECORDED N/A 5/24/2022    Procedure: Right Heart Catheterization;  Surgeon: Mike Pope MD;  Location:  HEART CARDIAC CATH LAB     CV RIGHT HEART CATH MEASUREMENTS RECORDED N/A 5/29/2022    Procedure: Right Heart Catheterization;  Surgeon: Austin Bright MD;  Location:  HEART CARDIAC CATH LAB     CV RIGHT HEART CATH MEASUREMENTS RECORDED N/A 7/1/2022    Procedure: Right Heart Catheterization;  Surgeon: Mike Pope MD;  Location:  HEART CARDIAC CATH LAB     CV RIGHT HEART EXERCISE STRESS STUDY N/A 05/18/2022    Procedure: Stress Drug Study;  Surgeon: Justo Stewart MD;  Location:  HEART CARDIAC CATH LAB     CV SWAN CHOCO PROCEDURE N/A 6/17/2022    Procedure: [place] Procedure;  Surgeon: Sherman Cerda MD;  Location:  HEART CARDIAC CATH LAB     INCISION AND DRAINAGE CHEST WASHOUT, COMBINED N/A 7/11/2022    Procedure: Chest washout and closure;  Surgeon: Darnell Morgan MD;  [facility]     INSERT INTRAAORTIC BALLOON PUMP Right 6/23/2022    Procedure: INSERTION, INTRA-AORTIC BALLOON PUMP RIGHT SUBCLAVIAN ARTERY.;  Surgeon: Hayden Veras MD;  Location: [facility] OR     INSERT VENTRICULAR ASSIST DEVICE LEFT (HEARTMATE II/III) MINIMALLY INVASIVE N/A 7/8/2022    Procedure: MEDIAN STERNOTOMY.  CARDIOPULMONARY BYPASS.  INTRAOPERATIVE TRANSESOPHAGEAL ECHOCARDIOGRAM.  IMPLANT LEFT VENTRICULAR ASSIST DEVICE HEARTMATE III.  PLACEMENT OF  PERCUTANEOUS RIGHT VENTRICULAR ASSIST DEVICE.  TEMPORARY CHEST CLOSURE;  Surgeon: Darius Zamora MD;  Location: [facility] OR     PICC DOUBLE LUMEN PLACEMENT Right 05/28/2022    right basilic 5 fr dl picc 40 cm      No Known Allergies   Social History     Tobacco Use     Smoking status: Not on file     Smokeless tobacco: Not on file   Substance Use Topics     Alcohol use: Not on file      Wt Readings from Last 1 Encounters:   07/13/22 79.7 kg (175 lb 11.3 oz)        Anesthesia Evaluation            ROS/MED HX  ENT/Pulmonary: Comment: Hypoxic respiratory failure      Neurologic: Comment: Sedated      Cardiovascular:     (+) Dyslipidemia hypertension--CAD -past MI -stent-Drug Eluting Stent. Taking blood thinners     METS/Exercise Tolerance:     Hematologic:     (+) History of blood clots, anemia, history of blood transfusion, no previous transfusion reaction,     Musculoskeletal:       GI/Hepatic:       Renal/Genitourinary:     (+) renal disease, type: CRI, Pt does not require dialysis,     Endo:       Psychiatric/Substance Use:       Infectious Disease:       Malignancy:       Other: Comment: Antiphospholipid syndrome           Physical Exam    Airway             Respiratory Devices and Support    ETT:      Dental           Cardiovascular          Rhythm and rate: regular     Pulmonary           (+) rhonchi, decreased breath sounds and rales           OUTSIDE LABS:  CBC:   Lab Results   Component Value Date    WBC 11.5 (H) 07/13/2022    WBC 9.9 07/13/2022    HGB 9.8 (L) 07/13/2022    HGB 9.1 (L) 07/13/2022    HCT 29.3 (L) 07/13/2022    HCT 27.4 (L) 07/13/2022    PLT 91 (L) 07/13/2022    PLT 81 (L) 07/13/2022     BMP:   Lab Results   Component Value Date     (L) 07/13/2022     07/13/2022    POTASSIUM 3.9 07/13/2022    POTASSIUM 4.2 07/13/2022    CHLORIDE 104 07/13/2022    CHLORIDE 106 07/13/2022    CO2 18 (L) 07/13/2022    CO2 19 (L) 07/13/2022    BUN 30.7 (H) 07/13/2022    BUN 29.4 (H) 07/13/2022    CR  0.89 07/13/2022    CR 0.89 07/13/2022     (H) 07/13/2022     (H) 07/13/2022     COAGS:   Lab Results   Component Value Date    PTT 42 (H) 07/13/2022    INR 1.13 07/13/2022    FIBR 330 07/12/2022     POC: No results found for: BGM, HCG, HCGS  HEPATIC:   Lab Results   Component Value Date    ALBUMIN 2.9 (L) 07/13/2022    PROTTOTAL 4.4 (L) 07/08/2022    ALT 25 07/13/2022     (H) 07/08/2022    ALKPHOS 59 07/08/2022    BILITOTAL 1.0 07/08/2022     OTHER:   Lab Results   Component Value Date    PH 7.37 07/13/2022    LACT 1.5 07/13/2022    A1C 5.5 05/20/2022    ELDA 8.4 (L) 07/13/2022    PHOS 3.9 07/13/2022    MAG 2.2 07/13/2022    TSH 1.65 05/20/2022    CRP 28.0 (H) 06/19/2022    SED 39 (H) 06/19/2022       Anesthesia Plan    ASA Status:  4, emergent       Anesthesia Type: General.     - Airway: ETT   Induction: Intravenous.   Maintenance: Balanced.   Techniques and Equipment:     - Lines/Monitors: 2nd IV, Arterial Line, Central Line, PAC, NIRS, BIS     Consents    Anesthesia Plan(s) and associated risks, benefits, and realistic alternatives discussed. Questions answered and patient/representative(s) expressed understanding.     - Discussed: Risks, Benefits and Alternatives for BOTH SEDATION and the PROCEDURE were discussed     - Discussed with:  Implied consent/emergency         Postoperative Care    Pain management: Multi-modal analgesia.   PONV prophylaxis: Ondansetron (or other 5HT-3)     Comments:                Rigoberto Navarro MD

## 2022-07-13 NOTE — CONSULTS
[facility] Nurse Inpatient Assessment     Consulted for: R) temporal wound    Patient History (according to provider note(s):      Dandy Sands is a 61 year old male with a PMH of CAD s/p PCI to LAD, MI, ICM, acute on chronic heart failure, s/p CRT-D, severe MR, antiphospholipid antibody syndrome on chronic warfarin, SLE, HTN, HLD, recent hospitalization 5/16-5/26 s/p RCA, LAD stenting who underwent RVAD (29F Protek duo RIJ) LVAD placement (HeartMate 3) on 7/8/22 by Dr. Zamora.    Areas Assessed:      Areas visualized during today's visit: head    Pressure Injury Location: R) temporal area      7/13    Last photo: 7/13  Wound type: Pressure Injury     Pressure Injury Stage: Deep Tissue Pressure Injury (DTPI), hospital acquired      This is a Medical Device Related Pressure Injury (MDRPI) due to RVAD Cannula was pressing against the area and wrapped with coban. Currently has optifoam in place and tube is pulled away from the area avoiding direct pressure.  Wound history/plan of care:   Per RN tube was tube was directly laying on the area,    Wound base: 100 % non-blanchable, maroon, purple and epidermis,      Palpation of the wound bed: normal      Drainage: none     Description of drainage: none     Measurements (length x width x depth, in cm) 1.5  x 2.2  x  0.0 cm      Tunneling N/A     Undermining N/A  Periwound skin: Intact      Color: normal and consistent with surrounding tissue      Temperature: normal   Odor: none  Pain: unable to assess due to  sedation , none  Pain intervention prior to dressing change: N/A  Treatment goal: Heal  and Protection  STATUS: initial assessment and evolving  Supplies ordered: supplies stored on unit    Treatment Plan:     R) temporal area wound(s): Daily    Continue to use optifoam around the cannula to avoid direct pressure and ensure the area is free of pressure. If area opens use Mepilex foam dressing to cover the area.       Orders: Written    RECOMMEND PRIMARY TEAM ORDER: None, at this time  Education provided: importance of repositioning, plan of care, wound progress and Off-loading pressure  Discussed plan of care with: Nurse  WOC nurse follow-up plan: weekly  Notify WOC if wound(s) deteriorate.  Nursing to notify the Provider(s) and re-consult the WOC Nurse if new skin concern.    DATA:     Current support surface: Standard  Low air loss mattress  Containment of urine/stool: Indwelling catheter  BMI: Body mass index is 27.52 kg/m .   Active diet order: Orders Placed This Encounter      NPO per Anesthesia Guidelines for Procedure/Surgery Except for: Meds     Output: I/O last 3 completed shifts:  In: 5277.47 [I.V.:1407.47; NG/GT:565]  Out: 2065 [Urine:723; Emesis/NG output:102; Chest Tube:1240]     Labs: Recent Labs   Lab 07/13/22  0948 07/13/22  0354   ALBUMIN  --  2.9*   HGB 9.8* 9.1*   INR 1.13 1.18*   WBC 11.5* 9.9     Pressure injury risk assessment:   Sensory Perception: 2-->very limited  Moisture: 3-->occasionally moist  Activity: 1-->bedfast  Mobility: 2-->very limited  Nutrition: 3-->adequate  Friction and Shear: 2-->potential problem  Case Score: 13    Caren Clemente RN   Dept. Pager: [phone]  Dept. Office Number: [phone]

## 2022-07-13 NOTE — BRIEF OP NOTE
[facility]    Brief Operative Note    Pre-operative diagnosis: Heart failure (H) [I50.9]  Post-operative diagnosis Same as pre-operative diagnosis    Procedure: Procedure(s):  IRRIGATION AND DEBRIDEMENT, CHEST  Surgeon: Surgeon(s) and Role:     * Darius Zamora MD - Primary  Anesthesia: General   Estimated Blood Loss: Minimal    Drains: Mediastinal x2, bilateral pleural, pump pocket  Specimens: * No specimens in log *  Findings:   hemostasis.  Complications: None.  Implants: * No implants in log *

## 2022-07-13 NOTE — PROGRESS NOTES
D: Stopped by to see patient. Met pt's son and his fiance. Discussed timing of education, likely not for another 1-2 weeks. Pt is currently with open chest, intubated, temporary RVAD in place. They verbalized understanding. No VAD related questions or concerns at this time. Provided caregiver packet and will send education videos to watch via email. Also spoke with pt's daughter over the phone.   I: Discussed POC and provided support and listened to patient and caregiver's thoughts and concerns.  P: Continue to follow patient and address any questions or concerns patient and or caregiver may have.

## 2022-07-13 NOTE — ANESTHESIA POSTPROCEDURE EVALUATION
Patient: Dandy Sands    Procedure: Procedure(s):  INCISION AND DRAINAGE, WOUND, CHEST, WITH IRRIGATION       Anesthesia Type:  No value filed.    Note:  Disposition: ICU            ICU Sign Out: Anesthesiologist/ICU physician sign out WAS performed   Postop Pain Control: Uneventful            Sign Out: Well controlled pain   PONV: No   Neuro/Psych: Uneventful            Sign Out: PLANNED postop sedation   Airway/Respiratory: Uneventful            Sign Out: AIRWAY IN SITU/Resp. Support               Airway in situ/Resp. Support: ETT                 Reason: Planned Pre-op   CV/Hemodynamics:             Sign Out: Detailed CV status (LVAD HM3, RVAD protek)               Blood Pressure: Normal               Rate/Rhythm: Normal HR               Perfusion:  Adequate perfusion indices   Other NRE: NONE   DID A NON-ROUTINE EVENT OCCUR? No           Last vitals:  Vitals:    07/12/22 2115 07/12/22 2130 07/12/22 2145   BP:      Pulse: 89 72 (!) 42   Resp: 16     Temp: 36.4  C (97.5  F) 36.2  C (97.2  F) 36.1  C (97  F)   SpO2: 99% 98% 98%       Electronically Signed By: Daljit Buckley MD  July 12, 2022  11:44 PM

## 2022-07-13 NOTE — PROGRESS NOTES
[facility]    Cardiology Progress Note- Cards 2        Date of Admission:  6/17/2022     Assessment & Plan: HVSL   Dandy EDUARD Sands is a 61 year old male with PMH of lupus, antiphospholipid syndrome, HTN, HLD, HFrEF 2/2 ICM who presented with cardiogenic shock c/b multiorgan failure (JOSE, shock liver) requiring mechanical support with IABP, now s/p HM3 LVAD 7/8/22 by Dr. Zamora with intraoperative course c/b RV failure s/p 29F Protek duo via RIJ. Post op course c/b hemopericardium s/p repeat washout with chest left open    #HFrEF 2/2 ICM s/p HM3 LVAD in setting of cardiogenic shock (SCAI D)  #RV failure s/p Protek duo temporary RVAD  #Post chest closure hemopericardium s/p repeat washout  Patient with ICM s/p HM3 LVAD 7/8/22 by Dr. Zamora in setting of presentation for cardiogenic shock requiring MCS with IABP. Intraoperative course c/b RV failure resulting in cardiogenic shock requiring high dose pressors necessitating RVAD support. Chest closed 7/11 and Ashli weaned. However developed tachycardia, lactic acidosis and decreased hemoglobin with CT scan noting hemopericardium. Returned to OR where underwent washout with large clot burden noted over the right atrium and around LVAD outflow graft. Chest left open and returned to ICU where pressors were able to be weaned. Noted to have stable RVAD/LVAD flows throughout and this AM. Currently hemodynamically stable with end-organ function and hemoglobin. Currently holding AC per surgical team with plan for repeat closure pending clinical course.   -continue to closely monitor hemodynamics, end organ function parameters  -AC, antiplatelets per surgical team  -continue LVAD/RVAD at current speed, flow  -continue abx for open chest per surgical team      The patient's care was discussed with the Attending Physician, Dr. Askew, Bedside Nurse and Patient.    Emelyn Meneses MD  [facility]  [facility]    ______________________________________________________________________    Interval History   Nursing notes reviewed. Noted for hemopericardium overnight with return to OR for washout. Chest left open. Noted to stabilize hemodynamics and normalize lactate overnight. This AM, noted for stable LVAD/RVAD flows.     Data reviewed today: I reviewed all medications, new labs and imaging results over the last 24 hours. I personally reviewed the chest x-ray image(s) showing stable lung appearance     Physical Exam   Vital Signs: Temp: 98.2  F (36.8  C) Temp src: Esophageal BP: (!) 87/60 Pulse: 99   Resp: 12 SpO2: 98 % O2 Device: Mechanical Ventilator    Weight: 175 lbs 11.31 oz  General Appearance: Intubated, sedated male in ICU bed  Respiratory: ventilated lung sounds, clear anterior breath sounds  Cardiovascular: vad hum, driveline site c/d/i, protek in place, chest open  GI: soft, bs active  Skin: warm, well perfused, minimal peripheral edema  Neuro: sedated, intubated, pupils equal    Data   Recent Labs   Lab 07/13/22  0358 07/13/22  0354 07/12/22  2350 07/12/22  2348 07/12/22  2236 07/12/22  1957 07/12/22  1558 07/12/22  0355 07/12/22  0349 07/08/22  2225 07/08/22  2213 07/08/22  1644 07/08/22  1643   WBC  --  9.9  --  14.2* 14.4*  --  23.1*   < > 12.1*   < > 7.8   < > 14.1*   HGB  --  9.1*  --  9.4* 9.3*  --  8.1*   < > 8.2*   < > 8.7*   < > 7.2*   MCV  --  86  --  87 89  --  89   < > 89   < > 89   < > 89   PLT  --  81*  --  67* 82*  --  137*   < > 94*   < > 114*   < > 163   INR  --  1.18*  --   --  1.23*  --  1.35*   < > 1.26*   < > 1.45*   < > 1.87*   NA  --  137  --  132*  --   --  129*   < > 134*   < > 137  --  141   POTASSIUM  --  4.2  --  4.4  --   --  4.8   < > 4.5   < > 4.4  --  3.9   CHLORIDE  --  106  --  104  --   --  101   < > 106   < > 106  --  107   CO2  --  19*  --  19*  --   --  15*   < > 20*   < > 22  --  20*   BUN  --  29.4*  --  28.5*  --   --  27.6*   < > 19.8   < > 16.7  --  14.0    CR  --  0.89  --  0.89  --   --  1.09   < > 0.66*   < > 0.78  --  0.73   ANIONGAP  --  12  --  9  --   --  13   < > 8   < > 9  --  14   ELDA  --  8.0*  --  7.9*  --   --  7.9*   < > 7.8*   < > 8.3*  --  8.2*   * 185* 103* 112*  --    < > 192*   < > 153*   < > 101*   < > 108*   ALBUMIN  --  2.9*  --   --   --   --   --   --  2.3*   < > 2.5*  --  2.3*   PROTTOTAL  --   --   --   --   --   --   --   --   --   --  4.4*  --  3.8*   BILITOTAL  --   --   --   --   --   --   --   --   --   --  1.0  --  1.0   ALKPHOS  --   --   --   --   --   --   --   --   --   --  59  --  51   ALT  --  25  --   --   --   --   --   --  21   < > 29  --  25   AST  --   --   --   --   --   --   --   --   --   --  159*  --  134*    < > = values in this interval not displayed.       Medications     BETA BLOCKER NOT PRESCRIBED       dexmedetomidine Stopped (07/13/22 0000)     dextrose       fentaNYL 150 mcg/hr (07/13/22 0700)     [Held by provider] heparin Stopped (07/12/22 1319)     Patient RECEIVING antibiotic to treat a different condition and it provides ADEQUATE COVERAGE for this surgical procedure.       propofol (DIPRIVAN) infusion 40 mcg/kg/min (07/13/22 0700)       acetaminophen  975 mg Oral or Feeding Tube Q8H CAMMY     aspirin  81 mg Oral or Feeding Tube Daily     DULoxetine  30 mg Oral Daily     fluticasone-salmeterol  2 puff Inhalation BID     hydroxychloroquine  200 mg Oral or Feeding Tube BID     insulin aspart  1-12 Units Subcutaneous Q4H     multivitamins w/minerals  15 mL Per Feeding Tube Daily     pantoprazole (PROTONIX) IV  40 mg Intravenous BID     polyethylene glycol  17 g Oral or Feeding Tube Daily     protein modular  1 packet Per Feeding Tube TID     senna-docusate  1 tablet Oral or Feeding Tube BID     sodium chloride (PF)  3 mL Intracatheter Q8H     sodium chloride (PF)  3 mL Intracatheter Q8H

## 2022-07-13 NOTE — ANESTHESIA PREPROCEDURE EVALUATION
Anesthesia Pre-Procedure Evaluation    Patient: Dandy Sands   MRN: [id number] : 1960        Procedure : Procedure(s):  INCISION AND DRAINAGE, WOUND, CHEST, WITH IRRIGATION          No past medical history on file.   Past Surgical History:   Procedure Laterality Date     COLONOSCOPY N/A 2022    Procedure: COLONOSCOPY;  Surgeon: Parth Meneses MD;  Location: [facility]     CV CORONARY ANGIOGRAM N/A 2022    Procedure: Coronary Angiogram;  Surgeon: Mike Pope MD;  Location: [facility]     CV CORONARY ANGIOGRAM N/A 2022    Procedure: Coronary Angiogram;  Surgeon: Austin Bright MD;  Location: [facility]     CV CORONARY LITHOTRIPSY PCI Bilateral 2022    Procedure: Percutaneous Coronary Intervention - Lithotripsy;  Surgeon: Mike Pope MD;  Location: [facility]     CV INTRA AORTIC BALLOON N/A 2022    Procedure: Intraprocedure Aortic Balloon Pump Insertion;  Surgeon: Sherman Cerda MD;  Location: [facility]     CV INTRA AORTIC BALLOON Right 7/3/2022    Procedure: Reposition of Subclavian Intraprocedure Aortic Balloon Pump;  Surgeon: Austin Bright MD;  Location: [facility]     CV INTRAVASULAR ULTRASOUND N/A 2022    Procedure: Intravascular Ultrasound;  Surgeon: Mike Pope MD;  [facility]     CV PCI ANGIOPLASTY N/A 2022    Procedure: Percutaneous Transluminal Angioplasty;  Surgeon: Austin Bright MD;  Location: [facility]     CV PCI STENT DRUG ELUTING N/A 2022    Procedure: Percutaneous Coronary Intervention Stent;  Surgeon: Mike Pope MD;  [facility]     CV RIGHT HEART CATH MEASUREMENTS RECORDED N/A 2022    Procedure: Right Heart Catheterization;  Surgeon: Justo Stewart MD;  Location:  [facility]     CV RIGHT HEART CATH MEASUREMENTS RECORDED N/A 2022     Procedure: Right Heart Catheterization;  Surgeon: Mike Pope MD;  Location:  [facility]     CV RIGHT HEART CATH MEASUREMENTS RECORDED N/A 5/29/2022    Procedure: Right Heart Catheterization;  Surgeon: Austin Bright MD;  Location:  [facility]     CV RIGHT HEART CATH MEASUREMENTS RECORDED N/A 7/1/2022    Procedure: Right Heart Catheterization;  Surgeon: Mike Pope MD;  Location:  [facility]     CV RIGHT HEART EXERCISE STRESS STUDY N/A 05/18/2022    Procedure: Stress Drug Study;  Surgeon: Justo Stewart MD;  Location:  [facility]ATH [facility]     CV SWAN CHOCO PROCEDURE N/A 6/17/2022    Procedure: [place] Procedure;  Surgeon: Sherman Cerda MD;  Location:  [facility]ARD[facility]ATH [facility]     INSERT INTRAAORTIC BALLOON PUMP Right 6/23/2022    Procedure: INSERTION, INTRA-AORTIC BALLOON PUMP RIGHT SUBCLAVIAN ARTERY.;  Surgeon: Hayden Veras MD;  Location:  OR     INSERT VENTRICULAR ASSIST DEVICE LEFT (HEARTMATE II/III) MINIMALLY INVASIVE N/A 7/8/2022    Procedure: MEDIAN STERNOTOMY.  CARDIOPULMONARY BYPASS.  INTRAOPERATIVE TRANSESOPHAGEAL ECHOCARDIOGRAM.  IMPLANT LEFT VENTRICULAR ASSIST DEVICE HEARTMATE III.  PLACEMENT OF PERCUTANEOUS RIGHT VENTRICULAR ASSIST DEVICE.  TEMPORARY CHEST CLOSURE;  Surgeon: Darius Zamora MD;  Location: [facility]     PICC DOUBLE LUMEN PLACEMENT Right 05/28/2022    right basilic 5 fr dl picc 40 cm      No Known Allergies   Social History     Tobacco Use     Smoking status: Not on file     Smokeless tobacco: Not on file   Substance Use Topics     Alcohol use: Not on file      Wt Readings from Last 1 Encounters:   07/12/22 76.1 kg (167 lb 12.3 oz)        Anesthesia Evaluation   Pt has had prior anesthetic.         ROS/MED HX  ENT/Pulmonary:       Neurologic:       Cardiovascular: Comment: HM3 and protek duo 7/8, not on pressors      METS/Exercise Tolerance:     Hematologic:       Musculoskeletal:       GI/Hepatic:        Renal/Genitourinary:       Endo:       Psychiatric/Substance Use:       Infectious Disease:       Malignancy:       Other:            Physical Exam    Airway   unable to assess          Respiratory Devices and Support    ETT:      Dental    unable to assess        Cardiovascular          Rhythm and rate: regular     Pulmonary                   OUTSIDE LABS:  CBC:   Lab Results   Component Value Date    WBC 14.4 (H) 07/12/2022    WBC 23.1 (H) 07/12/2022    HGB 9.3 (L) 07/12/2022    HGB 8.1 (L) 07/12/2022    HCT 28.7 (L) 07/12/2022    HCT 24.9 (L) 07/12/2022    PLT 82 (L) 07/12/2022     (L) 07/12/2022     BMP:   Lab Results   Component Value Date     (L) 07/12/2022     (L) 07/12/2022    POTASSIUM 4.8 07/12/2022    POTASSIUM 4.8 07/12/2022    CHLORIDE 101 07/12/2022    CHLORIDE 104 07/12/2022    CO2 15 (L) 07/12/2022    CO2 17 (L) 07/12/2022    BUN 27.6 (H) 07/12/2022    BUN 24.3 (H) 07/12/2022    CR 1.09 07/12/2022    CR 0.86 07/12/2022     (H) 07/12/2022     (H) 07/12/2022     COAGS:   Lab Results   Component Value Date    PTT 43 (H) 07/12/2022    INR 1.23 (H) 07/12/2022    FIBR 330 07/12/2022     POC: No results found for: BGM, HCG, HCGS  HEPATIC:   Lab Results   Component Value Date    ALBUMIN 2.3 (L) 07/12/2022    PROTTOTAL 4.4 (L) 07/08/2022    ALT 21 07/12/2022     (H) 07/08/2022    ALKPHOS 59 07/08/2022    BILITOTAL 1.0 07/08/2022     OTHER:   Lab Results   Component Value Date    PH 7.33 (L) 07/12/2022    LACT 4.2 (HH) 07/12/2022    A1C 5.5 05/20/2022    ELDA 7.9 (L) 07/12/2022    PHOS 4.8 (H) 07/12/2022    MAG 1.9 07/12/2022    TSH 1.65 05/20/2022    CRP 28.0 (H) 06/19/2022    SED 39 (H) 06/19/2022       Anesthesia Plan    ASA Status:  5, emergent    NPO Status:  NPO Appropriate    Anesthesia Type: General.     - Airway: ETT         Techniques and Equipment:     - Lines/Monitors: 2nd IV, Central Line, Arterial Line, NIRS     - Blood: Blood in Room      Consents    Anesthesia Plan(s) and associated risks, benefits, and realistic alternatives discussed. Questions answered and patient/representative(s) expressed understanding.    - Discussed:     - Discussed with:  Implied consent/emergency         Postoperative Care    Pain management: IV analgesics, Oral pain medications.   PONV prophylaxis: Ondansetron (or other 5HT-3), Dexamethasone or Solumedrol     Comments:    Other Comments: Emergent chest exploration for perciardial effusion on CT in context of rising lactate. Not on pressors. Plan for washout and leaving chest open.             Daljit Buckley MD

## 2022-07-14 ENCOUNTER — APPOINTMENT (OUTPATIENT)
Dept: GENERAL RADIOLOGY | Facility: CLINIC | Age: 62
DRG: 001 | End: 2022-07-14
Attending: INTERNAL MEDICINE
Payer: COMMERCIAL

## 2022-07-14 ENCOUNTER — APPOINTMENT (OUTPATIENT)
Dept: GENERAL RADIOLOGY | Facility: CLINIC | Age: 62
DRG: 001 | End: 2022-07-14
Attending: SURGERY
Payer: COMMERCIAL

## 2022-07-14 LAB
ABO/RH(D): NORMAL
ACT BLD: 139 SECONDS (ref 74–150)
ACT BLD: 152 SECONDS (ref 74–150)
ALBUMIN SERPL BCG-MCNC: 2.3 G/DL (ref 3.5–5.2)
ALT SERPL W P-5'-P-CCNC: 23 U/L (ref 10–50)
ANION GAP SERPL CALCULATED.3IONS-SCNC: 10 MMOL/L (ref 7–15)
ANTIBODY SCREEN: NEGATIVE
APTT PPP: 43 SECONDS (ref 22–38)
APTT PPP: 43 SECONDS (ref 22–38)
APTT PPP: 47 SECONDS (ref 22–38)
AT III ACT/NOR PPP CHRO: 93 % (ref 85–135)
BACTERIA BLD CULT: NO GROWTH
BASE EXCESS BLDA CALC-SCNC: -1.8 MMOL/L (ref -9–1.8)
BASE EXCESS BLDA CALC-SCNC: -1.8 MMOL/L (ref -9–1.8)
BASE EXCESS BLDA CALC-SCNC: -2 MMOL/L (ref -9–1.8)
BASE EXCESS BLDA CALC-SCNC: -2.4 MMOL/L (ref -9–1.8)
BASE EXCESS BLDA CALC-SCNC: -2.5 MMOL/L (ref -9–1.8)
BASE EXCESS BLDA CALC-SCNC: -2.9 MMOL/L (ref -9–1.8)
BASOPHILS # BLD AUTO: 0.2 10E3/UL (ref 0–0.2)
BASOPHILS # BLD MANUAL: 0 10E3/UL (ref 0–0.2)
BASOPHILS # BLD MANUAL: 0 10E3/UL (ref 0–0.2)
BASOPHILS # BLD MANUAL: 0.4 10E3/UL (ref 0–0.2)
BASOPHILS NFR BLD AUTO: 1 %
BASOPHILS NFR BLD MANUAL: 0 %
BASOPHILS NFR BLD MANUAL: 0 %
BASOPHILS NFR BLD MANUAL: 2 %
BUN SERPL-MCNC: 28.7 MG/DL (ref 8–23)
BUN SERPL-MCNC: 29.2 MG/DL (ref 8–23)
BUN SERPL-MCNC: 31.6 MG/DL (ref 8–23)
BURR CELLS BLD QL SMEAR: SLIGHT
CA-I BLD-MCNC: 4.3 MG/DL (ref 4.4–5.2)
CA-I BLD-MCNC: 4.4 MG/DL (ref 4.4–5.2)
CA-I BLD-MCNC: 4.4 MG/DL (ref 4.4–5.2)
CA-I BLD-MCNC: 4.7 MG/DL (ref 4.4–5.2)
CALCIUM SERPL-MCNC: 7.6 MG/DL (ref 8.8–10.2)
CALCIUM SERPL-MCNC: 7.7 MG/DL (ref 8.8–10.2)
CALCIUM SERPL-MCNC: 8.2 MG/DL (ref 8.8–10.2)
CHLORIDE SERPL-SCNC: 106 MMOL/L (ref 98–107)
CHLORIDE SERPL-SCNC: 106 MMOL/L (ref 98–107)
CHLORIDE SERPL-SCNC: 108 MMOL/L (ref 98–107)
CREAT SERPL-MCNC: 0.59 MG/DL (ref 0.67–1.17)
CREAT SERPL-MCNC: 0.6 MG/DL (ref 0.67–1.17)
CREAT SERPL-MCNC: 0.62 MG/DL (ref 0.67–1.17)
D DIMER PPP FEU-MCNC: 2.18 UG/ML FEU (ref 0–0.5)
D DIMER PPP FEU-MCNC: 2.21 UG/ML FEU (ref 0–0.5)
DEPRECATED HCO3 PLAS-SCNC: 19 MMOL/L (ref 22–29)
DEPRECATED HCO3 PLAS-SCNC: 19 MMOL/L (ref 22–29)
DEPRECATED HCO3 PLAS-SCNC: 20 MMOL/L (ref 22–29)
ELLIPTOCYTES BLD QL SMEAR: SLIGHT
ELLIPTOCYTES BLD QL SMEAR: SLIGHT
EOSINOPHIL # BLD AUTO: 0.3 10E3/UL (ref 0–0.7)
EOSINOPHIL # BLD MANUAL: 0 10E3/UL (ref 0–0.7)
EOSINOPHIL # BLD MANUAL: 0 10E3/UL (ref 0–0.7)
EOSINOPHIL # BLD MANUAL: 0.4 10E3/UL (ref 0–0.7)
EOSINOPHIL NFR BLD AUTO: 2 %
EOSINOPHIL NFR BLD MANUAL: 0 %
EOSINOPHIL NFR BLD MANUAL: 0 %
EOSINOPHIL NFR BLD MANUAL: 3 %
ERYTHROCYTE [DISTWIDTH] IN BLOOD BY AUTOMATED COUNT: 18 % (ref 10–15)
ERYTHROCYTE [DISTWIDTH] IN BLOOD BY AUTOMATED COUNT: 18 % (ref 10–15)
ERYTHROCYTE [DISTWIDTH] IN BLOOD BY AUTOMATED COUNT: 18.2 % (ref 10–15)
ERYTHROCYTE [DISTWIDTH] IN BLOOD BY AUTOMATED COUNT: 18.3 % (ref 10–15)
FIBRINOGEN PPP-MCNC: 435 MG/DL (ref 170–490)
FIBRINOGEN PPP-MCNC: 482 MG/DL (ref 170–490)
GFR SERPL CREATININE-BSD FRML MDRD: >90 ML/MIN/1.73M2
GLUCOSE BLDC GLUCOMTR-MCNC: 105 MG/DL (ref 70–99)
GLUCOSE BLDC GLUCOMTR-MCNC: 112 MG/DL (ref 70–99)
GLUCOSE BLDC GLUCOMTR-MCNC: 120 MG/DL (ref 70–99)
GLUCOSE BLDC GLUCOMTR-MCNC: 122 MG/DL (ref 70–99)
GLUCOSE BLDC GLUCOMTR-MCNC: 128 MG/DL (ref 70–99)
GLUCOSE BLDC GLUCOMTR-MCNC: 134 MG/DL (ref 70–99)
GLUCOSE BLDC GLUCOMTR-MCNC: 139 MG/DL (ref 70–99)
GLUCOSE BLDC GLUCOMTR-MCNC: 141 MG/DL (ref 70–99)
GLUCOSE BLDC GLUCOMTR-MCNC: 147 MG/DL (ref 70–99)
GLUCOSE BLDC GLUCOMTR-MCNC: 150 MG/DL (ref 70–99)
GLUCOSE BLDC GLUCOMTR-MCNC: 150 MG/DL (ref 70–99)
GLUCOSE BLDC GLUCOMTR-MCNC: 154 MG/DL (ref 70–99)
GLUCOSE BLDC GLUCOMTR-MCNC: 154 MG/DL (ref 70–99)
GLUCOSE BLDC GLUCOMTR-MCNC: 155 MG/DL (ref 70–99)
GLUCOSE BLDC GLUCOMTR-MCNC: 155 MG/DL (ref 70–99)
GLUCOSE BLDC GLUCOMTR-MCNC: 157 MG/DL (ref 70–99)
GLUCOSE BLDC GLUCOMTR-MCNC: 160 MG/DL (ref 70–99)
GLUCOSE BLDC GLUCOMTR-MCNC: 162 MG/DL (ref 70–99)
GLUCOSE BLDC GLUCOMTR-MCNC: 173 MG/DL (ref 70–99)
GLUCOSE BLDC GLUCOMTR-MCNC: 179 MG/DL (ref 70–99)
GLUCOSE SERPL-MCNC: 152 MG/DL (ref 70–99)
GLUCOSE SERPL-MCNC: 153 MG/DL (ref 70–99)
GLUCOSE SERPL-MCNC: 184 MG/DL (ref 70–99)
HCO3 BLD-SCNC: 20 MMOL/L (ref 21–28)
HCO3 BLD-SCNC: 21 MMOL/L (ref 21–28)
HCO3 BLD-SCNC: 21 MMOL/L (ref 21–28)
HCO3 BLD-SCNC: 22 MMOL/L (ref 21–28)
HCO3 BLD-SCNC: 23 MMOL/L (ref 21–28)
HCO3 BLD-SCNC: 23 MMOL/L (ref 21–28)
HCT VFR BLD AUTO: 22.2 % (ref 40–53)
HCT VFR BLD AUTO: 22.2 % (ref 40–53)
HCT VFR BLD AUTO: 23.3 % (ref 40–53)
HCT VFR BLD AUTO: 24.9 % (ref 40–53)
HCT VFR BLD AUTO: 24.9 % (ref 40–53)
HCT VFR BLD AUTO: 28 % (ref 40–53)
HGB BLD-MCNC: 7.3 G/DL (ref 13.3–17.7)
HGB BLD-MCNC: 7.3 G/DL (ref 13.3–17.7)
HGB BLD-MCNC: 7.6 G/DL (ref 13.3–17.7)
HGB BLD-MCNC: 8.2 G/DL (ref 13.3–17.7)
HGB BLD-MCNC: 8.2 G/DL (ref 13.3–17.7)
HGB BLD-MCNC: 9.2 G/DL (ref 13.3–17.7)
HGB FREE PLAS-MCNC: 80 MG/DL
INR PPP: 1.18 (ref 0.85–1.15)
INR PPP: 1.2 (ref 0.85–1.15)
INR PPP: 1.21 (ref 0.85–1.15)
INR PPP: 1.24 (ref 0.85–1.15)
LACTATE SERPL-SCNC: 1.3 MMOL/L (ref 0.7–2)
LACTATE SERPL-SCNC: 1.6 MMOL/L (ref 0.7–2)
LACTATE SERPL-SCNC: 1.7 MMOL/L (ref 0.7–2)
LACTATE SERPL-SCNC: 1.7 MMOL/L (ref 0.7–2)
LACTATE SERPL-SCNC: 2.1 MMOL/L (ref 0.7–2)
LYMPHOCYTES # BLD AUTO: 1.2 10E3/UL (ref 0.8–5.3)
LYMPHOCYTES # BLD MANUAL: 0.4 10E3/UL (ref 0.8–5.3)
LYMPHOCYTES # BLD MANUAL: 1 10E3/UL (ref 0.8–5.3)
LYMPHOCYTES # BLD MANUAL: 1.9 10E3/UL (ref 0.8–5.3)
LYMPHOCYTES NFR BLD AUTO: 7 %
LYMPHOCYTES NFR BLD MANUAL: 11 %
LYMPHOCYTES NFR BLD MANUAL: 3 %
LYMPHOCYTES NFR BLD MANUAL: 5 %
MAGNESIUM SERPL-MCNC: 2.3 MG/DL (ref 1.7–2.3)
MAGNESIUM SERPL-MCNC: 2.4 MG/DL (ref 1.7–2.3)
MCH RBC QN AUTO: 28.4 PG (ref 26.5–33)
MCH RBC QN AUTO: 28.8 PG (ref 26.5–33)
MCH RBC QN AUTO: 28.8 PG (ref 26.5–33)
MCH RBC QN AUTO: 28.9 PG (ref 26.5–33)
MCH RBC QN AUTO: 29.1 PG (ref 26.5–33)
MCH RBC QN AUTO: 29.1 PG (ref 26.5–33)
MCHC RBC AUTO-ENTMCNC: 32.6 G/DL (ref 31.5–36.5)
MCHC RBC AUTO-ENTMCNC: 32.9 G/DL (ref 31.5–36.5)
MCV RBC AUTO: 87 FL (ref 78–100)
MCV RBC AUTO: 88 FL (ref 78–100)
MCV RBC AUTO: 88 FL (ref 78–100)
MCV RBC AUTO: 89 FL (ref 78–100)
METAMYELOCYTES # BLD MANUAL: 0.3 10E3/UL
METAMYELOCYTES # BLD MANUAL: 0.5 10E3/UL
METAMYELOCYTES NFR BLD MANUAL: 2 %
METAMYELOCYTES NFR BLD MANUAL: 3 %
MONOCYTES # BLD AUTO: 1.5 10E3/UL (ref 0–1.3)
MONOCYTES # BLD MANUAL: 0.5 10E3/UL (ref 0–1.3)
MONOCYTES # BLD MANUAL: 0.6 10E3/UL (ref 0–1.3)
MONOCYTES # BLD MANUAL: 1.4 10E3/UL (ref 0–1.3)
MONOCYTES NFR BLD AUTO: 9 %
MONOCYTES NFR BLD MANUAL: 10 %
MONOCYTES NFR BLD MANUAL: 3 %
MONOCYTES NFR BLD MANUAL: 3 %
MYELOCYTES # BLD MANUAL: 0.4 10E3/UL
MYELOCYTES # BLD MANUAL: 0.7 10E3/UL
MYELOCYTES # BLD MANUAL: 0.8 10E3/UL
MYELOCYTES NFR BLD MANUAL: 3 %
MYELOCYTES NFR BLD MANUAL: 4 %
MYELOCYTES NFR BLD MANUAL: 4 %
NEUTROPHILS # BLD AUTO: 12.4 10E3/UL (ref 1.6–8.3)
NEUTROPHILS # BLD MANUAL: 10.6 10E3/UL (ref 1.6–8.3)
NEUTROPHILS # BLD MANUAL: 13.9 10E3/UL (ref 1.6–8.3)
NEUTROPHILS # BLD MANUAL: 16.5 10E3/UL (ref 1.6–8.3)
NEUTROPHILS NFR BLD AUTO: 73 %
NEUTROPHILS NFR BLD MANUAL: 77 %
NEUTROPHILS NFR BLD MANUAL: 79 %
NEUTROPHILS NFR BLD MANUAL: 84 %
NRBC # BLD AUTO: 0.4 10E3/UL
NRBC # BLD AUTO: 0.4 10E3/UL
NRBC BLD MANUAL-RTO: 2 %
NRBC BLD MANUAL-RTO: 2 %
O2/TOTAL GAS SETTING VFR VENT: 30 %
OXYHGB MFR BLD: 97 % (ref 92–100)
OXYHGB MFR BLD: 97 % (ref 92–100)
OXYHGB MFR BLD: 98 % (ref 92–100)
PCO2 BLD: 27 MM HG (ref 35–45)
PCO2 BLD: 28 MM HG (ref 35–45)
PCO2 BLD: 30 MM HG (ref 35–45)
PCO2 BLD: 35 MM HG (ref 35–45)
PCO2 BLD: 35 MM HG (ref 35–45)
PCO2 BLD: 36 MM HG (ref 35–45)
PH BLD: 7.4 [PH] (ref 7.35–7.45)
PH BLD: 7.41 [PH] (ref 7.35–7.45)
PH BLD: 7.41 [PH] (ref 7.35–7.45)
PH BLD: 7.46 [PH] (ref 7.35–7.45)
PH BLD: 7.47 [PH] (ref 7.35–7.45)
PH BLD: 7.49 [PH] (ref 7.35–7.45)
PHOSPHATE SERPL-MCNC: 2 MG/DL (ref 2.5–4.5)
PHOSPHATE SERPL-MCNC: 2.3 MG/DL (ref 2.5–4.5)
PLAT MORPH BLD: ABNORMAL
PLAT MORPH BLD: NORMAL
PLATELET # BLD AUTO: 110 10E3/UL (ref 150–450)
PLATELET # BLD AUTO: 119 10E3/UL (ref 150–450)
PLATELET # BLD AUTO: 130 10E3/UL (ref 150–450)
PLATELET # BLD AUTO: 93 10E3/UL (ref 150–450)
PO2 BLD: 100 MM HG (ref 80–105)
PO2 BLD: 113 MM HG (ref 80–105)
PO2 BLD: 116 MM HG (ref 80–105)
PO2 BLD: 121 MM HG (ref 80–105)
PO2 BLD: 126 MM HG (ref 80–105)
PO2 BLD: 91 MM HG (ref 80–105)
POTASSIUM SERPL-SCNC: 4.3 MMOL/L (ref 3.4–5.3)
POTASSIUM SERPL-SCNC: 4.3 MMOL/L (ref 3.4–5.3)
POTASSIUM SERPL-SCNC: 4.4 MMOL/L (ref 3.4–5.3)
PROMYELOCYTES # BLD MANUAL: 0.3 10E3/UL
PROMYELOCYTES # BLD MANUAL: 0.4 10E3/UL
PROMYELOCYTES NFR BLD MANUAL: 2 %
PROMYELOCYTES NFR BLD MANUAL: 2 %
RBC # BLD AUTO: 2.51 10E6/UL (ref 4.4–5.9)
RBC # BLD AUTO: 2.53 10E6/UL (ref 4.4–5.9)
RBC # BLD AUTO: 2.68 10E6/UL (ref 4.4–5.9)
RBC # BLD AUTO: 2.85 10E6/UL (ref 4.4–5.9)
RBC # BLD AUTO: 2.85 10E6/UL (ref 4.4–5.9)
RBC # BLD AUTO: 3.16 10E6/UL (ref 4.4–5.9)
RBC MORPH BLD: ABNORMAL
RBC MORPH BLD: NORMAL
SODIUM SERPL-SCNC: 135 MMOL/L (ref 136–145)
SODIUM SERPL-SCNC: 136 MMOL/L (ref 136–145)
SODIUM SERPL-SCNC: 137 MMOL/L (ref 136–145)
SPECIMEN EXPIRATION DATE: NORMAL
UFH PPP CHRO-ACNC: <0.1 IU/ML
WBC # BLD AUTO: 13.8 10E3/UL (ref 4–11)
WBC # BLD AUTO: 16.6 10E3/UL (ref 4–11)
WBC # BLD AUTO: 17 10E3/UL (ref 4–11)
WBC # BLD AUTO: 17 10E3/UL (ref 4–11)
WBC # BLD AUTO: 17.6 10E3/UL (ref 4–11)
WBC # BLD AUTO: 19.6 10E3/UL (ref 4–11)

## 2022-07-14 PROCEDURE — 83051 HEMOGLOBIN PLASMA: CPT | Performed by: SURGERY

## 2022-07-14 PROCEDURE — 82805 BLOOD GASES W/O2 SATURATION: CPT | Performed by: THORACIC SURGERY (CARDIOTHORACIC VASCULAR SURGERY)

## 2022-07-14 PROCEDURE — 80069 RENAL FUNCTION PANEL: CPT | Performed by: SURGERY

## 2022-07-14 PROCEDURE — 250N000009 HC RX 250: Performed by: STUDENT IN AN ORGANIZED HEALTH CARE EDUCATION/TRAINING PROGRAM

## 2022-07-14 PROCEDURE — 80048 BASIC METABOLIC PNL TOTAL CA: CPT | Performed by: STUDENT IN AN ORGANIZED HEALTH CARE EDUCATION/TRAINING PROGRAM

## 2022-07-14 PROCEDURE — 85384 FIBRINOGEN ACTIVITY: CPT | Performed by: SURGERY

## 2022-07-14 PROCEDURE — 82330 ASSAY OF CALCIUM: CPT | Performed by: SURGERY

## 2022-07-14 PROCEDURE — 71045 X-RAY EXAM CHEST 1 VIEW: CPT | Mod: 26 | Performed by: RADIOLOGY

## 2022-07-14 PROCEDURE — 94640 AIRWAY INHALATION TREATMENT: CPT

## 2022-07-14 PROCEDURE — 83605 ASSAY OF LACTIC ACID: CPT | Performed by: SURGERY

## 2022-07-14 PROCEDURE — 250N000011 HC RX IP 250 OP 636: Performed by: STUDENT IN AN ORGANIZED HEALTH CARE EDUCATION/TRAINING PROGRAM

## 2022-07-14 PROCEDURE — 999N000157 HC STATISTIC RCP TIME EA 10 MIN

## 2022-07-14 PROCEDURE — 250N000013 HC RX MED GY IP 250 OP 250 PS 637: Performed by: SURGERY

## 2022-07-14 PROCEDURE — 85300 ANTITHROMBIN III ACTIVITY: CPT | Performed by: SURGERY

## 2022-07-14 PROCEDURE — 71045 X-RAY EXAM CHEST 1 VIEW: CPT

## 2022-07-14 PROCEDURE — 85520 HEPARIN ASSAY: CPT | Performed by: SURGERY

## 2022-07-14 PROCEDURE — 85027 COMPLETE CBC AUTOMATED: CPT | Performed by: STUDENT IN AN ORGANIZED HEALTH CARE EDUCATION/TRAINING PROGRAM

## 2022-07-14 PROCEDURE — 99233 SBSQ HOSP IP/OBS HIGH 50: CPT | Mod: GC | Performed by: INTERNAL MEDICINE

## 2022-07-14 PROCEDURE — 84460 ALANINE AMINO (ALT) (SGPT): CPT | Performed by: SURGERY

## 2022-07-14 PROCEDURE — 85730 THROMBOPLASTIN TIME PARTIAL: CPT | Performed by: SURGERY

## 2022-07-14 PROCEDURE — 250N000011 HC RX IP 250 OP 636: Performed by: SURGERY

## 2022-07-14 PROCEDURE — 999N000015 HC STATISTIC ARTERIAL MONITORING DAILY

## 2022-07-14 PROCEDURE — 250N000009 HC RX 250: Performed by: SURGERY

## 2022-07-14 PROCEDURE — C9113 INJ PANTOPRAZOLE SODIUM, VIA: HCPCS | Performed by: SURGERY

## 2022-07-14 PROCEDURE — 85027 COMPLETE CBC AUTOMATED: CPT | Performed by: SURGERY

## 2022-07-14 PROCEDURE — 85014 HEMATOCRIT: CPT | Performed by: THORACIC SURGERY (CARDIOTHORACIC VASCULAR SURGERY)

## 2022-07-14 PROCEDURE — 93010 ELECTROCARDIOGRAM REPORT: CPT | Performed by: INTERNAL MEDICINE

## 2022-07-14 PROCEDURE — 83735 ASSAY OF MAGNESIUM: CPT | Performed by: THORACIC SURGERY (CARDIOTHORACIC VASCULAR SURGERY)

## 2022-07-14 PROCEDURE — 71045 X-RAY EXAM CHEST 1 VIEW: CPT | Mod: 77

## 2022-07-14 PROCEDURE — 82040 ASSAY OF SERUM ALBUMIN: CPT | Performed by: SURGERY

## 2022-07-14 PROCEDURE — 85347 COAGULATION TIME ACTIVATED: CPT

## 2022-07-14 PROCEDURE — 250N000013 HC RX MED GY IP 250 OP 250 PS 637: Performed by: STUDENT IN AN ORGANIZED HEALTH CARE EDUCATION/TRAINING PROGRAM

## 2022-07-14 PROCEDURE — 83735 ASSAY OF MAGNESIUM: CPT | Performed by: SURGERY

## 2022-07-14 PROCEDURE — 85610 PROTHROMBIN TIME: CPT | Performed by: SURGERY

## 2022-07-14 PROCEDURE — 200N000002 HC R&B ICU UMMC

## 2022-07-14 PROCEDURE — 94003 VENT MGMT INPAT SUBQ DAY: CPT

## 2022-07-14 PROCEDURE — 258N000003 HC RX IP 258 OP 636: Performed by: STUDENT IN AN ORGANIZED HEALTH CARE EDUCATION/TRAINING PROGRAM

## 2022-07-14 PROCEDURE — 82805 BLOOD GASES W/O2 SATURATION: CPT | Performed by: SURGERY

## 2022-07-14 PROCEDURE — 85007 BL SMEAR W/DIFF WBC COUNT: CPT | Performed by: SURGERY

## 2022-07-14 PROCEDURE — 85379 FIBRIN DEGRADATION QUANT: CPT | Performed by: SURGERY

## 2022-07-14 PROCEDURE — 86850 RBC ANTIBODY SCREEN: CPT | Performed by: THORACIC SURGERY (CARDIOTHORACIC VASCULAR SURGERY)

## 2022-07-14 PROCEDURE — 94640 AIRWAY INHALATION TREATMENT: CPT | Mod: 76

## 2022-07-14 PROCEDURE — 258N000003 HC RX IP 258 OP 636: Performed by: THORACIC SURGERY (CARDIOTHORACIC VASCULAR SURGERY)

## 2022-07-14 PROCEDURE — 82310 ASSAY OF CALCIUM: CPT | Performed by: SURGERY

## 2022-07-14 PROCEDURE — 86923 COMPATIBILITY TEST ELECTRIC: CPT | Performed by: SURGERY

## 2022-07-14 PROCEDURE — 85007 BL SMEAR W/DIFF WBC COUNT: CPT | Performed by: THORACIC SURGERY (CARDIOTHORACIC VASCULAR SURGERY)

## 2022-07-14 PROCEDURE — 99291 CRITICAL CARE FIRST HOUR: CPT | Mod: 24 | Performed by: ANESTHESIOLOGY

## 2022-07-14 PROCEDURE — 93005 ELECTROCARDIOGRAM TRACING: CPT

## 2022-07-14 PROCEDURE — 86923 COMPATIBILITY TEST ELECTRIC: CPT | Performed by: STUDENT IN AN ORGANIZED HEALTH CARE EDUCATION/TRAINING PROGRAM

## 2022-07-14 PROCEDURE — 250N000009 HC RX 250: Performed by: THORACIC SURGERY (CARDIOTHORACIC VASCULAR SURGERY)

## 2022-07-14 PROCEDURE — 84100 ASSAY OF PHOSPHORUS: CPT | Performed by: THORACIC SURGERY (CARDIOTHORACIC VASCULAR SURGERY)

## 2022-07-14 RX ORDER — HYDRALAZINE HYDROCHLORIDE 10 MG/1
10 TABLET, FILM COATED ORAL EVERY 8 HOURS SCHEDULED
Status: DISCONTINUED | OUTPATIENT
Start: 2022-07-14 | End: 2022-07-17

## 2022-07-14 RX ORDER — HEPARIN SODIUM 10000 [USP'U]/100ML
400 INJECTION, SOLUTION INTRAVENOUS CONTINUOUS
Status: DISCONTINUED | OUTPATIENT
Start: 2022-07-14 | End: 2022-07-15

## 2022-07-14 RX ORDER — CALCIUM GLUCONATE 20 MG/ML
1 INJECTION, SOLUTION INTRAVENOUS ONCE
Status: COMPLETED | OUTPATIENT
Start: 2022-07-14 | End: 2022-07-14

## 2022-07-14 RX ORDER — HYDRALAZINE HYDROCHLORIDE 20 MG/ML
10-20 INJECTION INTRAMUSCULAR; INTRAVENOUS EVERY 4 HOURS PRN
Status: DISCONTINUED | OUTPATIENT
Start: 2022-07-14 | End: 2022-08-21 | Stop reason: HOSPADM

## 2022-07-14 RX ORDER — DEXMEDETOMIDINE HYDROCHLORIDE 4 UG/ML
.1-1.2 INJECTION, SOLUTION INTRAVENOUS CONTINUOUS
Status: DISCONTINUED | OUTPATIENT
Start: 2022-07-14 | End: 2022-07-20

## 2022-07-14 RX ADMIN — Medication 1 PACKET: at 20:04

## 2022-07-14 RX ADMIN — PROPOFOL 40 MCG/KG/MIN: 10 INJECTION, EMULSION INTRAVENOUS at 19:25

## 2022-07-14 RX ADMIN — PROPOFOL 45 MCG/KG/MIN: 10 INJECTION, EMULSION INTRAVENOUS at 02:00

## 2022-07-14 RX ADMIN — AMIODARONE HYDROCHLORIDE 150 MG: 1.5 INJECTION, SOLUTION INTRAVENOUS at 17:34

## 2022-07-14 RX ADMIN — Medication 1 PACKET: at 08:22

## 2022-07-14 RX ADMIN — Medication: at 01:25

## 2022-07-14 RX ADMIN — ACETAMINOPHEN 975 MG: 325 TABLET, FILM COATED ORAL at 14:33

## 2022-07-14 RX ADMIN — ASPIRIN 81 MG CHEWABLE TABLET 81 MG: 81 TABLET CHEWABLE at 08:21

## 2022-07-14 RX ADMIN — MULTIVITAMIN 15 ML: LIQUID ORAL at 08:22

## 2022-07-14 RX ADMIN — SENNOSIDES AND DOCUSATE SODIUM 1 TABLET: 8.6; 5 TABLET ORAL at 08:29

## 2022-07-14 RX ADMIN — HYDRALAZINE HYDROCHLORIDE 10 MG: 20 INJECTION INTRAMUSCULAR; INTRAVENOUS at 09:47

## 2022-07-14 RX ADMIN — POTASSIUM PHOSPHATE, MONOBASIC AND POTASSIUM PHOSPHATE, DIBASIC 9 MMOL: 224; 236 INJECTION, SOLUTION, CONCENTRATE INTRAVENOUS at 05:59

## 2022-07-14 RX ADMIN — PROPOFOL 45 MCG/KG/MIN: 10 INJECTION, EMULSION INTRAVENOUS at 07:11

## 2022-07-14 RX ADMIN — VANCOMYCIN HYDROCHLORIDE 1250 MG: 10 INJECTION, POWDER, LYOPHILIZED, FOR SOLUTION INTRAVENOUS at 09:17

## 2022-07-14 RX ADMIN — Medication 100 MCG/HR: at 21:05

## 2022-07-14 RX ADMIN — PANTOPRAZOLE SODIUM 40 MG: 40 INJECTION, POWDER, FOR SOLUTION INTRAVENOUS at 08:22

## 2022-07-14 RX ADMIN — PANTOPRAZOLE SODIUM 40 MG: 40 INJECTION, POWDER, FOR SOLUTION INTRAVENOUS at 20:05

## 2022-07-14 RX ADMIN — PROPOFOL 20 MCG/KG/MIN: 10 INJECTION, EMULSION INTRAVENOUS at 13:09

## 2022-07-14 RX ADMIN — HUMAN INSULIN 4 UNITS/HR: 100 INJECTION, SOLUTION SUBCUTANEOUS at 10:43

## 2022-07-14 RX ADMIN — HEPARIN SODIUM AND DEXTROSE 400 UNITS/HR: 10000; 5 INJECTION INTRAVENOUS at 10:05

## 2022-07-14 RX ADMIN — HYDRALAZINE HYDROCHLORIDE 10 MG: 10 TABLET, FILM COATED ORAL at 14:34

## 2022-07-14 RX ADMIN — POTASSIUM PHOSPHATE, MONOBASIC AND POTASSIUM PHOSPHATE, DIBASIC 9 MMOL: 224; 236 INJECTION, SOLUTION, CONCENTRATE INTRAVENOUS at 17:30

## 2022-07-14 RX ADMIN — CEFEPIME HYDROCHLORIDE 2 G: 2 INJECTION, POWDER, FOR SOLUTION INTRAVENOUS at 12:40

## 2022-07-14 RX ADMIN — Medication 1 PACKET: at 14:34

## 2022-07-14 RX ADMIN — ACETAMINOPHEN 975 MG: 325 TABLET, FILM COATED ORAL at 22:31

## 2022-07-14 RX ADMIN — AMIODARONE HYDROCHLORIDE 1 MG/MIN: 50 INJECTION, SOLUTION INTRAVENOUS at 17:58

## 2022-07-14 RX ADMIN — CALCIUM GLUCONATE 1 G: 20 INJECTION, SOLUTION INTRAVENOUS at 14:34

## 2022-07-14 RX ADMIN — HYDRALAZINE HYDROCHLORIDE 10 MG: 10 TABLET, FILM COATED ORAL at 09:17

## 2022-07-14 RX ADMIN — ACETAMINOPHEN 975 MG: 325 TABLET, FILM COATED ORAL at 05:32

## 2022-07-14 RX ADMIN — DULOXETINE 30 MG: 30 CAPSULE, DELAYED RELEASE ORAL at 08:23

## 2022-07-14 RX ADMIN — DEXMEDETOMIDINE HYDROCHLORIDE 0.5 MCG/KG/HR: 4 INJECTION, SOLUTION INTRAVENOUS at 09:17

## 2022-07-14 RX ADMIN — HYDRALAZINE HYDROCHLORIDE 10 MG: 10 TABLET, FILM COATED ORAL at 22:31

## 2022-07-14 RX ADMIN — FLUTICASONE PROPIONATE AND SALMETEROL XINAFOATE 2 PUFF: 115; 21 AEROSOL, METERED RESPIRATORY (INHALATION) at 19:53

## 2022-07-14 RX ADMIN — FLUTICASONE PROPIONATE AND SALMETEROL XINAFOATE 2 PUFF: 115; 21 AEROSOL, METERED RESPIRATORY (INHALATION) at 07:51

## 2022-07-14 ASSESSMENT — ACTIVITIES OF DAILY LIVING (ADL)
ADLS_ACUITY_SCORE: 38

## 2022-07-14 NOTE — OP NOTE
OPERATIVE DATE: 7/13/2022    PRE-OPERATIVE DIAGNOSIS:  1) PRE-OPERATIVE DIAGNOSIS:  1) Ischemic cardiomyopathy and heart failure  2) Lupus  3) Hypertension  4) Hyperlipidemia      Patient Active Problem List   Diagnosis     Decompensated heart failure (H)     Cardiogenic shock (H)      POST-OPERATIVE DIAGNOSIS:  1)  Ischemic cardiomyopathy and heart failure  2) Lupus  3) Hypertension  4) Hyperlipidemia      Patient Active Problem List   Diagnosis     Decompensated heart failure (H)     Cardiogenic shock (H)     PROCEDURE:  1) Chest washout  2) Chest closure    SURGEON: Darius Zamora MD    ASSISTANT: Ann Alonzo MD    ANESTHESIA: GETA    ESTIMATED BLOOD LOSS: 10cc    INDICATIONS:  Mr. OLEG KAUR is a 61 year old male admitted with decompensated heart failure.  The patient had LVAD, followed by chest exploration for bleeding.  He has stabilized over the last 24 hours.  I recommended to the family take back and chest washout.  Risks and benefits of the operation were explained to the patient and their family including, but not limited to, bleeding, infection, stroke and even death.  They understood these risks and agreed to proceed electively.    OPERATIVE REPORT:  The patient was transferred to the operating room and positioned supine on the OR table.  General anesthesia was initiated by the anesthesia team.  Endotracheal intubation and IV access was already in place. The patients neck, chest, abdomen and bilateral lower extremities were clipped, prepped and draped in sterile fashion.  A pre-procedure time-out was performed confirming the correct patient, correct site and correct procedure.    The previous dressing was removed.  There was no active bleeding.  The sternum was approximated with sternal wires.  The wound was closed in layers using vicryl stitches.    The patient was then transferred from the operating bed to an ICU bed and transferred to the ICU in critical, but stable, condition.    All  needle, sponge and instrument counts were correct at the end of the case.    Darius Zamora  Cardiothoracic Surgery  Pager: [phone]

## 2022-07-14 NOTE — PLAN OF CARE
Major Shift Events: T max 99.5F. Continues on propofol and fentanyl for sedation. Sedation holiday done. Pt pressure supported for about 2 hours today - switched back to previous vent settings d/t tachypnea RR>35 and increasing agitation. Around 1300, pt had a small run of VT - team notified. Pt went into Af[facility] this afternoon, [id number] - EKG done, labs drawn, and started on amio gtt 1mg/min. LVAD/RVAD wnl, no alarms. New R radial art line placed today and left one removed. Started on straight rate heparin. Continues on insulin gtt. Loose watery BMsx5. Rectal tube in place w/ small leaking. Phos replaced. Last hemoglobin 7.6.     Plan: Trend hemoglobin.     For vital signs and complete assessments, please see documentation flowsheets.

## 2022-07-14 NOTE — OP NOTE
OPERATIVE DATE: 7/12/2022    PRE-OPERATIVE DIAGNOSIS:  1) Ischemic cardiomyopathy and heart failure  2) Lupus  3) Hypertension  4) Hyperlipidemia  5) Pericardial tamponade  Patient Active Problem List   Diagnosis     Decompensated heart failure (H)     Cardiogenic shock (H)      POST-OPERATIVE DIAGNOSIS:  1)  Ischemic cardiomyopathy and heart failure  2) Lupus  3) Hypertension  4) Hyperlipidemia  5) Pericardial tamponade      Patient Active Problem List   Diagnosis     Decompensated heart failure (H)     Cardiogenic shock (H)     PROCEDURE:  1) Chest washout  2) Temporary chest closure    SURGEON: Darius Zamora MD    ASSISTANT: Miguel Iglesias MD    ANESTHESIA: GETA    ESTIMATED BLOOD LOSS: 1000cc    INDICATIONS:  Mr. OLEG KAUR is a 61 year old male admitted with decompensated heart failure.  The patient had LVAD on 7/8, and chest closure on 7/11.  Over the last 24 hours he has had decrease in Hgb and elevated lactate.  Chest CT demonstrated large intrapericardial collection of blood.  I recommended to the family take back and chest washout.  Risks and benefits of the operation were explained to the patient and their family including, but not limited to, bleeding, infection, stroke and even death.  They understood these risks and agreed to proceed electively.    OPERATIVE REPORT:  The patient was transferred to the operating room and positioned supine on the OR table.  General anesthesia was initiated by the anesthesia team.  Endotracheal intubation and IV access was already in place. The patients neck, chest, abdomen and bilateral lower extremities were clipped, prepped and draped in sterile fashion.  A pre-procedure time-out was performed confirming the correct patient, correct site and correct procedure.    The previous skin incision was opened.  The sternal wires were removed.  There was a large amount of blood and clot in the pericardium.  There was active bleeding from the outflow graft anastomosis.   This was repaired with pledgeted 5-0 prolene.      I decided to pack the chest open.  The chest was packed with 2 lap sponges.  An esmarch dressing was sutured in place.  An ioban dressing was applied.    The patient was then transferred from the operating bed to an ICU bed and transferred to the ICU in critical, but stable, condition.    All needle, sponge and instrument counts were correct at the end of the case.    Darius Zamora  Cardiothoracic Surgery  Pager: [phone]

## 2022-07-14 NOTE — PROGRESS NOTES
CV ICU PROGRESS NOTE  07/14/2022        CO-MORBIDITIES  1. Cardiogenic shock (H)  (primary encounter diagnosis)  2. Ischemic cardiomyopathy  3. Heart failure with reduced ejection fraction, NYHA class III (H)  4. Coronary artery disease involving native coronary artery of native heart without angina pectoris      ASSESSMENT Dandy Sands is a 61 year old male with a PMH of CAD s/p PCI to LAD, MI, ICM, acute on chronic heart failure, s/p CRT-D, severe MR, antiphospholipid antibody syndrome on chronic warfarin, SLE, HTN, HLD, recent hospitalization 5/16-5/26 s/p RCA, LAD stenting who underwent RVAD (29F Protek duo RIJ) LVAD placement (HeartMate 3) on 7/8/22 by Dr. Zamora. Intraoperative course complicated by IABP dysfunction and RV failure, requiring crash onto bypass / vasopressor support, NO, and protek placement. Now s/p chest closure and NO wean 7/11. Return to OR for hemopericardium (7/12) and chest re-closure 7/13.    Changes 7/14:  - Minimal vent settings, PST  - Rass -1 to -2  - AC plan: straight rate heparin 400U/hr  - Stop cefepime / Vancomycin this evening since chest closed   - Hydralazine 10gm q8h  - Hydralazine prn MAP >90  - Begin empiric Zosyn for 7/12 sputum positive (4+ candida, 1+ ecoli, 3+ enterobacter )      PLAN:   Neuro/ pain/ sedation:  #Acute Postoperative pain  #Depression   #Anxiety due to a medical condition  #Insomnia  - Monitor neurological status. Notify the MD for any acute changes in exam.  - Pain: fentanyl gtt. Scheduled tylenol. PRN tylenol, oxycodone, dilaudid.  - Sedation: propofol gtt, RASS -1 to -2  - Duloxetine 30mg  - Holding PTA meds: hydroxyzine 25mg PRN, zolpidem 5mg, melatonin 3mg, lorazepam 0.5mg    Pulmonary:   #Acute hypoxic respiratory failure on iMV  #Mild-moderate emphysema  #History of COVID-19  - Off nitric 7/12  - Mechanical ventilation, wean after off chest closure   - PST, possible extubation this afternoon    Cardiovascular:    #S/p LVAD placement (HeartMate  3) on 22   #S/p RVAD (29F Protek duo RIJ) on 22  #S/p chest closure   #Cardiogenic shock  #Advanced ischemic cardiomyopathy, class IV, stage D  #CAD s/p PCI to LAD ()  #S/p CRT-D ()  #History of MI ()  #Severe MR  #Antiphospholipid antibody syndrome on chronic warfarin  #HTN, HLD  #Recent hospitalization - s/p RCA, LAD stenting  #Atrial fibrillation   - Monitor hemodynamic status. Chest closure and oxygenator splicing . Reopened  for I&D and left open, plan for closure .  - Goal MAP > 65   - PTA meds: atorvastatin 40mg, clopidogrel 75mg, lasix 40mg, warfarin 5mg  - LVAD management per [facility] service   - flows stable, circuit functioning w/o clots/fibrin  - AC Plan: straight rate 400U/hr   - Hydralazine 10gm q8h  - Hydralazine prn MAP >90     GI / Nutrition:   #GERD  #Congestive hepatopathy in the setting of cardiac insuffiency  - NPO 2/2 intubation   - Titrate NJT to goal  - Bowel regimen (miralax / senna)  - Nutrition consulted.  - Trend LFT's    Hematemesis / oral mucosal bleedin/12 x2, 7/13 x1 (oral, around ETT/ETT clear).   - continue PPI BID x 72 hours (7/15 change after 12:00 noon)   - hgb stable   - OG without patience bloody output     Renal/ Fluid Balance:    - Strict I/O, daily weights   - Avoid/limit nephrotoxins as able  - Replete lytes PRN per protocol  - PTA meds: tamsulosin 0.4mg (held)  - Fluid goal net even      Endocrine:    #Stress induced hyperglycemia  Preop A1c 5.5%  - Continue Insulin gtt perioperatively   - Goal BG <180 for optimal healing       ID/ Antibiotics:  #Periopearive antibiosis (s/p course of Cefepime, vancomycin, rifampin, fluconazole)  - Discontinue Cefepime and Vancomycin (-), chest now closed   - Trend fever curve   - Pan culture (): Bcx x2, UA/UC -> negative     - Endotracheal sputum culture (4+ candida albicans, 1+ E.Coli, 3+ enterococcus faecalis)  - Stat Zosyn, pending sensitivities     Heme:     #Acute  blood loss anemia  #Acute blood loss thrombocytopenia  #History of DVT and PE   #Antiphospholipid antibody syndrome  #History of iron deficiency anemia  - Monitor for s/sx of bleeding  - s/p 3u pRBC, 2u Plt 7/12 perioperative needs  - Trend CBC and coags.  - Hgb goal > 8  - Plt goal > 20    Rheumatology:  #SLE  - Chronic, symptoms include arthritis, photosensitivity, fatigue, and skin manifestations  - PTA meds: hydroxychloroquine 200mg, hold 1 week until post repeat  chest closure 7/13    Prophylaxis:    - DVT prophylaxis: SCD only   - PPI  - Bowel regimen  - PT/OT    Lines / Tubes / Drains:  - R brachial triple lumen PICC  - L upper arm PIV  - Left radial A-line       Disposition:  - CV ICU.        ====================================================    Patient seen, findings and plan discussed with CV ICU staff, Dr. Samano.      Rashard Stephenson MD  Anesthesiology, PGY3    ====================================================        SUBJECTIVE  - Chest closed, stable post operatively   - Patient intubated and sedated     OBJECTIVE    1. VITAL SIGNS    Temp:  [97  F (36.1  C)-99.7  F (37.6  C)] 99.3  F (37.4  C)  Pulse:  [] 107  Resp:  [8-16] 13  MAP:  [81 mmHg-109 mmHg] 81 mmHg  Arterial Line BP: ()/(47-90) 87/65  FiO2 (%):  [30 %] 30 %  SpO2:  [80 %-100 %] 95 %  Vent Mode: CMV/AC  (Continuous Mandatory Ventilation/ Assist Control)  FiO2 (%): 30 %  Resp Rate (Set): 12 breaths/min  Tidal Volume (Set, mL): 450 mL  PEEP (cm H2O): 5 cmH2O  Pressure Support (cm H2O): 7 cmH2O  Resp: 13        2. INTAKE/ OUTPUT    I/O last 3 completed shifts:  In: 3833.04 [I.V.:1335.04; NG/GT:598]  Out: 2418 [Urine:908; Emesis/NG output:250; Stool:100; Blood:20; Chest Tube:1140]      3. PHYSICAL EXAMINATION    General: sedated  Neuro: RAAS -2   Resp: intubated  CV: SR  Abdomen: Soft, Non-distended  Skin/Incisions: chest closed, right internal jugular cannulation, left femoral CVC/arterial line.  Extremities: warm and well  perfused. LUE ecchymosis, unchanged      4. INVESTIGATIONS    Arterial Blood Gases   Recent Labs   Lab 07/14/22 0337 07/14/22  0113 07/13/22 2121 07/13/22 1932   PH 7.41 7.40 7.35 7.36   PCO2 36 35 39 35   PO2 126* 116* 108* 109*   HCO3 23 22 21 20*     Complete Blood Count   Recent Labs   Lab 07/14/22  0337 07/13/22 2102 07/13/22 1853 07/13/22 1813 07/13/22  1741 07/13/22  0948   WBC 13.8* 16.3* 19.5*  --   --  11.5*   HGB 9.2* 9.5* 10.1* 9.9*   < > 9.8*   PLT 93* 91* 116*  --   --  91*    < > = values in this interval not displayed.     Basic Metabolic Panel  Recent Labs   Lab 07/14/22  0558 07/14/22  0505 07/14/22 0351 07/14/22 0337 07/13/22 2136 07/13/22 2102 07/13/22 1938 07/13/22 1853 07/13/22 1850 07/13/22 1813 07/13/22  0951 07/13/22  0948   NA  --   --   --  136  --  137  --  137  --  137   < > 134*   POTASSIUM  --   --   --  4.3  --  4.2  --  4.5  --  4.0   < > 3.9   CHLORIDE  --   --   --  106  --  107  --  107  --   --   --  104   CO2  --   --   --  20*  --  20*  --  19*  --   --   --  18*   BUN  --   --   --  31.6*  --  32.3*  --  31.2*  --   --   --  30.7*   CR  --   --   --  0.62*  --  0.70  --  0.75  --   --   --  0.89   * 122* 154* 152*   < > 197*   < > 94   < > 78   < > 171*    < > = values in this interval not displayed.     Liver Function Tests  Recent Labs   Lab 07/14/22 0337 07/13/22 2102 07/13/22 1853 07/13/22  0948 07/13/22  0354 07/12/22  1007 07/12/22  0349 07/11/22  0518 07/11/22  0452 07/11/22  0337 07/09/22  0408 07/08/22  2213 07/08/22  1843 07/08/22  1643 07/08/22  1400 07/08/22  0332   AST  --   --   --   --   --   --   --   --   --   --   --  159*  --  134*  --  32   ALT 23  --   --   --  25  --  21  --  19  --    < > 29  --  25  --  39   ALKPHOS  --   --   --   --   --   --   --   --   --   --   --  59  --  51  --  127   BILITOTAL  --   --   --   --   --   --   --   --   --   --   --  1.0  --  1.0  --  0.3   ALBUMIN 2.3*  --   --   --  2.9*  --  2.3*  --   --   0.6*   < > 2.5*  --  2.3*  --  3.5   INR 1.21* 1.19* 1.22* 1.13 1.18*   < > 1.26*   < >  --   --    < > 1.45*   < > 1.87*   < > 1.21*    < > = values in this interval not displayed.     Pancreatic Enzymes  No lab results found in last 7 days.  Coagulation Profile  Recent Labs   Lab 07/14/22  0337 07/13/22  2102 07/13/22  1853 07/13/22  0948   INR 1.21* 1.19* 1.22* 1.13   PTT 43* 45* 58* 42*         5. RADIOLOGY    Recent Results (from the past 24 hour(s))   XR Feeding Tube Placement    Narrative    FEEDING TUBE PLACEMENT 7/11/2022 2:00 PM    INDICATION: Nutritional needs, open chest     COMPARISON: None.    FLUOROSCOPY TIME: 2.05 minutes    FINDINGS: The feeding tube was advanced under fluoroscopic guidance  with final position of tip in the second portion of the duodenum. A  small amount of barium was injected to demonstrate placement within  the small bowel. The tube was flushed with sterile water and secured  via nasal bridle. There were no complications of the procedure.   Partially visualized chest tubes/drains and IVC filter.      Impression    IMPRESSION: Uncomplicated feeding tube placement with tip in the  second portion of the duodenum.    I, JOSE M OSHEA MD, attest that I was present for all critical  portions of the procedure and was immediately available to provide  guidance and assistance during the remainder of the procedure.    I have personally reviewed the examination and initial interpretation  and I agree with the findings.    JOSE M OSHEA MD         SYSTEM ID:  [id number]   Cardiac Device Check - Inpatient   Result Value    Date Time Interrogation Session [id number]    Implantable Pulse Generator  Medtronic    Implantable Pulse Generator Model [id number] Evera XT     Implantable Pulse Generator Serial Number [id number]    Type Interrogation Session In Clinic    Clinic Name [facility]    Implantable Pulse Generator Type Defibrillator    Implantable Pulse  Generator Implant Date 20180412    Implantable Lead  Medtronic    Implantable Lead Model 5076 CapSureFix Novus    Implantable Lead Serial Number [id number]    Implantable Lead Implant Date 20060306    Implantable Lead Polarity Type Bipolar Lead    Implantable Lead Location Detail 1 UNKNOWN    Implantable Lead Location Right Atrium    Implantable Lead  Medtronic    Implantable Lead Model 6949 Sprint Gambier    Implantable Lead Serial Number [id number]    Implantable Lead Implant Date 20060306    Implantable Lead Polarity Type Bipolar Lead    Implantable Lead Location Detail 1 UNKNOWN    Implantable Lead Location Right Ventricle    Marcos Setting Mode (NBG Code) MVP_AAI_DDD    Marcos Setting Lower Rate Limit 50    Marcos Setting Maximum Tracking Rate 130    Marcos Setting Maximum Sensor Rate 115    Marcos Setting Hysterisis Rate DISABLED    Marcos Setting TRISTEN Delay Low 350    Marcos Setting PAV Delay Low 350    Marcos Setting AT Mode Switch Rate 171    Lead Channel Setting Sensing Polarity Bipolar    Lead Channel Setting Sensing Anode Location Right Atrium    Lead Channel Setting Sensing Anode Terminal Ring    Lead Channel Setting Sensing Cathode Location Right Atrium    Lead Channel Setting Sensing Cathode Terminal Tip    Lead Channel Setting Sensing Sensitivity 0.3    Lead Channel Setting Sensing Polarity Bipolar    Lead Channel Setting Sensing Anode Location Right Ventricle    Lead Channel Setting Sensing Anode Terminal Ring    Lead Channel Setting Sensing Cathode Location Right Ventricle    Lead Channel Setting Sensing Cathode Terminal Tip    Lead Channel Setting Sensing Sensitivity 0.3    Lead Channel Setting Pacing Polarity Bipolar    Lead Channel Setting Pacing Anode Location Right Atrium    Lead Channel Setting Pacing Anode Terminal Ring    Lead Channel Setting Sensing Cathode Location Right Atrium    Lead Channel Setting Sensing Cathode Terminal Tip    Lead Channel Setting Pacing Pulse Width  0.4    Lead Channel Setting Pacing Amplitude 1.5    Lead Channel Setting Pacing Capture Mode Adaptive    Lead Channel Setting Pacing Polarity Bipolar    Lead Channel Setting Pacing Anode Location Right Ventricle    Lead Channel Setting Pacing Anode Terminal Ring    Lead Channel Setting Sensing Cathode Location Right Ventricle    Lead Channel Setting Sensing Cathode Terminal Tip    Lead Channel Setting Pacing Pulse Width 1    Lead Channel Setting Pacing Amplitude 5    Lead Channel Setting Pacing Capture Mode Adaptive    Zone Setting Type Category VF    Zone Setting Detection Interval 320    Zone Setting Detection Beats Numerator 30    Zone Setting Detection Beats Denominator 40    Zone Setting Type Category VT    Zone Setting Detection Interval 280    Zone Setting Type Category VT    Zone Setting Detection Interval 350    Zone Setting Type Category VT    Zone Setting Detection Interval 400    Zone Setting Type Category ATRIAL_FIBRILLATION    Zone Setting Type Category AT/AF    Zone Setting Detection Interval 350    Lead Channel Impedance Value 304    Lead Channel Sensing Intrinsic Amplitude 0.875    Lead Channel Sensing Intrinsic Amplitude 1.5    Lead Channel Pacing Threshold Amplitude 0.75    Lead Channel Pacing Threshold Pulse Width 0.4    Lead Channel Impedance Value 703    Lead Channel Impedance Value 608    Lead Channel Sensing Intrinsic Amplitude 0.875    Lead Channel Sensing Intrinsic Amplitude 1.125    Lead Channel Pacing Threshold Amplitude 2.5    Lead Channel Pacing Threshold Pulse Width 0.4    Battery Date Time of Measurements 98424041378284    Battery Status OK    Battery RRT Trigger 2.727    Battery Remaining Longevity 63    Battery Voltage 2.96    Capacitor Charge Type Reformation    Capacitor Last Charge Date Time 79064844845908    Capacitor Charge Time 3.973    Capacitor Charge Energy 18    Marcos Statistic Date Time Start 50344345459848    Marcos Statistic Date Time End 93633633406453    Marcos  Statistic RA Percent Paced 0.1    Marcos Statistic RV Percent Paced 0.1    Marcos Statistic AP  Percent 0.02    Marcos Statistic AS  Percent 0.08    Marcos Statistic AP VS Percent 0.08    Marcos Statistic AS VS Percent 99.82    Atrial Tachy Statistic Date Time Start 20220708170240    Atrial Tachy Statistic Date Time End 99124725710619    Atrial Tachy Statistic AT/AF [place] Percent 3.1    Therapy Statistic Recent Shocks Delivered 0    Therapy Statistic Recent Shocks Aborted 0    Therapy Statistic Recent ATP Delivered 0    Therapy Statistic Recent Date Time Start 20220708170240    Therapy Statistic Recent Date Time End 26148570268001    Therapy Statistic Total Shocks Delivered 1    Therapy Statistic Total Shocks Aborted 0    Therapy Statistic Total ATP Delivered 0    Therapy Statistic Total  Date Time Start 37727895385249    Therapy Statistic Total  Date Time End 31472654246885    Episode Statistic Recent Count 28    Episode Statistic Type Category AT/AF    Episode Statistic Recent Count 0    Episode Statistic Type Category SVT    Episode Statistic Recent Count 0    Episode Statistic Type Category VT    Episode Statistic Recent Count 0    Episode Statistic Type Category VF    Episode Statistic Recent Count 0    Episode Statistic Type Category VT    Episode Statistic Recent Count 0    Episode Statistic Type Category VT    Episode Statistic Recent Count 0    Episode Statistic Type Category VT    Episode Statistic Recent Date Time Start 65474936415022    Episode Statistic Recent Date Time End 79327356210893    Episode Statistic Recent Date Time Start 98221122484034    Episode Statistic Recent Date Time End 57043268693393    Episode Statistic Recent Date Time Start 67222553387556    Episode Statistic Recent Date Time End 45583439928891    Episode Statistic Recent Date Time Start 59284918060128    Episode Statistic Recent Date Time End 17631482998642    Episode Statistic Recent Date Time Start 11407367016406    Episode  Statistic Recent Date Time End 96297423834470    Episode Statistic Recent Date Time Start 92495668683107    Episode Statistic Recent Date Time End 08060568316605    Episode Statistic Recent Date Time Start 70304784524533    Episode Statistic Recent Date Time End 94700897856213    Episode Statistic Total Count 33    Episode Statistic Type Category AT/AF    Episode Statistic Total Count 0    Episode Statistic Type Category SVT    Episode Statistic Total Count 9    Episode Statistic Type Category VT    Episode Statistic Total Count 1    Episode Statistic Type Category VF    Episode Statistic Total Count 0    Episode Statistic Type Category VT    Episode Statistic Total Count 0    Episode Statistic Type Category VT    Episode Statistic Total Count 1    Episode Statistic Type Category VT    Episode Statistic Total Date Time Start 47714531692725    Episode Statistic Total Date Time End 18182729906036    Episode Statistic Total Date Time Start 80216453723022    Episode Statistic Total Date Time End 45846162416969    Episode Statistic Total Date Time Start 62385146635581    Episode Statistic Total Date Time End 68615544792681    Episode Statistic Total Date Time Start 31207183581924    Episode Statistic Total Date Time End 32646539113407    Episode Statistic Total Date Time Start 08497876295398    Episode Statistic Total Date Time End 66158439861929    Episode Statistic Total Date Time Start 91315632169536    Episode Statistic Total Date Time End 87790312995153    Episode Statistic Total Date Time Start 99577251199289    Episode Statistic Total Date Time End 91951226361309    Episode Identifier 56    Episode Type Category Monitor    Episode Date Time 88443124616433    Episode Duration 61    Episode Identifier 55    Episode Type Category Monitor    Episode Date Time [id number]    Episode Duration 144    Episode Identifier 54    Episode Type Category Monitor    Episode Date Time [id number]    Episode Duration 93     Episode Identifier 53    Episode Type Category Monitor    Episode Date Time [id number]    Episode Duration 214    Episode Identifier 52    Episode Type Category Monitor    Episode Date Time [id number]    Episode Duration 309    Episode Identifier 51    Episode Type Category Monitor    Episode Date Time 74114054507729    Episode Duration 316    Episode Identifier 50    Episode Type Category Monitor    Episode Date Time [id number]    Episode Duration 180    Episode Identifier 49    Episode Type Category Monitor    Episode Date Time [id number]    Episode Duration 597    Episode Identifier 48    Episode Type Category Monitor    Episode Date Time [id number]    Episode Duration 102    Episode Identifier 47    Episode Type Category Monitor    Episode Date Time [id number]    Episode Duration 970    Episode Identifier 46    Episode Type Category Monitor    Episode Date Time [id number]    Episode Duration 350    Episode Identifier 45    Episode Type Category Monitor    Episode Date Time [id number]    Episode Duration 351    Episode Identifier 44    Episode Type Category Monitor    Episode Date Time [id number]    Episode Duration 102    Episode Identifier 43    Episode Type Category Monitor    Episode Date Time [id number]    Episode Duration 353    Episode Identifier 42    Episode Type Category Monitor    Episode Date Time [id number]    Episode Duration 328    Episode Identifier 41    Episode Type Category Monitor    Episode Date Time [id number]    Episode Duration 152    Episode Identifier 40    Episode Type Category Monitor    Episode Date Time [id number]    Episode Duration 235    Episode Identifier 39    Episode Type Category Monitor    Episode Date Time [id number]    Episode Duration 296    Episode Identifier 38    Episode Type Category Monitor    Episode Date Time [id number]    Episode Duration 203    Episode Identifier 37    Episode Type Category Monitor     Episode Date Time [id number]    Episode Duration 182    Episode Identifier 36    Episode Type Category Monitor    Episode Date Time [id number]    Episode Duration 361    Episode Identifier 35    Episode Type Category Monitor    Episode Date Time 19017431264701    Episode Duration 297    Episode Identifier 34    Episode Type Category Monitor    Episode Date Time [id number]    Episode Duration 99    Episode Identifier 33    Episode Type Category Monitor    Episode Date Time [id number]    Episode Duration 228    Episode Identifier 32    Episode Type Category Monitor    Episode Date Time [id number]    Episode Duration 278    Episode Identifier 31    Episode Type Category Monitor    Episode Date Time [id number]    Episode Duration 186    Episode Identifier 30    Episode Type Category Monitor    Episode Date Time [id number]    Episode Duration 378    Episode Identifier 29    Episode Type Category Monitor    Episode Date Time [id number]    Episode Duration 322    Narrative    Patient seen in OR #27 pre-operatively for evaluation and iterative programming of their ICD per MD orders.     Device: Stealz [id number] Evera XT   Normal device function  Mode: AAI>DDD  bpm  AP: <0.1%  : <0.1%  Intrinsic rhythm:  bpm  Thoracic Impedance: Below the reference line suggesting possible intrathoracic fluid accumulation.  Short V-V intervals: 0  Lead Trends Appear Stable: yes  Estimated battery longevity to RRT = 5.2 years  Atrial Arrhythmia: 28 AT/AF episodes recorded - 1 min 1 sec - 16 min 10 sec.  AF [place]: 3.1%  Anticoagulant:   Ventricular Arrhythmia: 0  Setting Changes: VY detection, FVT detection and VT monitor zones programmed off.     TRINA Amador RN    Dual lead ICD    I have reviewed and interpreted the device interrogation, settings, programming and nurse's summary. The device is functioning within normal device parameters. I agree with the current findings, assessment and plan.    XR Chest Port 1 View    Narrative    EXAM: TEMPORARY  7/11/2022 5:37 PM     HISTORY:  Chest closure, intraoperative       COMPARISON:  7/11/2022    FINDINGS  Technique: Semiupright AP view of the chest.     Devices: Single view of the chest. The endotracheal tube tip projects  over the midthoracic trachea. Gastric tube passes below the left  hemidiaphragm. Stable LVAD and left chest wall cardiac device with  atrial and ventricular leads. Right upper extremity PICC tip is  obscured in the region of the high SVC. Stable positioning of right IJ  RVAD with tip in the pulmonary artery. Mediastinal drains and left  basilar chest tube. Chest is open with surgical sponges projected over  the mediastinum.    Unchanged cardiomegaly. Small bilateral pleural effusions. No  discernible pneumothorax.       Impression    IMPRESSION:   No unexpected retained foreign body. Remaining support devices stable  in position.    Results reported to LITA Camp, OR circulator 7/11/2022 1749 hours    I have personally reviewed the examination and initial interpretation  and I agree with the findings.    KASI EAST MD         SYSTEM ID:  [id number]   XR Abdomen Port 1 View    Narrative    XR ABDOMEN PORT 1 VIEWS  7/11/2022 6:56 PM      HISTORY: post chest closure and verification of lines    COMPARISON: 7/11/2022, 7/3/2022    FINDINGS:   Single AP view of the abdomen. Stable thoracic findings. Stable IVC  filter. Feeding tube tip overlying the proximal jejunum with residual  enteric contrast over the stomach. Relative paucity of bowel gas. No  definite pneumatosis or portal venous gas.      Impression    IMPRESSION:   Feeding tube tip overlying the proximal jejunum.    I have personally reviewed the examination and initial interpretation  and I agree with the findings.    KASI EAST MD         SYSTEM ID:  [id number]   XR Chest Port 1 View    Narrative    EXAM: XR CHEST PORT 1 VIEW  7/12/2022 1:15 AM     HISTORY:  RVAD/LVAD/ETT        COMPARISON:  7/11/2022    FINDINGS: Single view of the chest. Postsurgical changes of the chest  with median sternotomy wires. Endotracheal tube tip projects over the  mid thoracic trachea. Feeding tube courses below the diaphragm and  beyond the field-of-view. The tip is at least to the body of the  stomach. Right upper extremity PICC tip is obscured in the region of  mid SVC. Right IJ RVAD with tip over the distal main pulmonary trunk.  Stable left chest wall cardiac device and LVAD. Stable mediastinal  drains and left basilar chest tube.     Stable enlarged cardiac silhouette. Small bilateral pleural effusions.  No appreciable pneumothorax. Similar perihilar and bibasilar  opacities.      Impression    IMPRESSION:   1. Stable support devices.  2. Stable small pleural effusions and bilateral pulmonary opacities.    I have personally reviewed the examination and initial interpretation  and I agree with the findings.    LAI HERNADEZ MD         SYSTEM ID:  [id number]   Cardiac Device Check - Inpatient   Result Value    Date Time Interrogation Session [id number]    Implantable Pulse Generator  Medtronic    Implantable Pulse Generator Model [id number] Eliseo TIRADO DR    Implantable Pulse Generator Serial Number [id number]    Type Interrogation Session In Clinic    Clinic Name [facility]    Implantable Pulse Generator Type Defibrillator    Implantable Pulse Generator Implant Date 20180412    Implantable Lead  Medtronic    Implantable Lead Model 5076 CapSureFix Novus    Implantable Lead Serial Number [id number]    Implantable Lead Implant Date 20060306    Implantable Lead Polarity Type Bipolar Lead    Implantable Lead Location Detail 1 UNKNOWN    Implantable Lead Location Right Atrium    Implantable Lead  Medtronic    Implantable Lead Model 6949 [employer] [place]    Implantable Lead Serial Number [id number]    Implantable Lead Implant Date 20060306    Implantable  Lead Polarity Type Bipolar Lead    Implantable Lead Location Detail 1 UNKNOWN    Implantable Lead Location Right Ventricle    Marcos Setting Mode (NBG Code) MVP_AAI_DDD    Marcos Setting Lower Rate Limit 50    Marcos Setting Maximum Tracking Rate 130    Marcos Setting Maximum Sensor Rate 115    Marcos Setting Hysterisis Rate DISABLED    Marcos Setting TRISTEN Delay Low 350    Marcos Setting PAV Delay Low 350    Marcos Setting AT Mode Switch Rate 171    Lead Channel Setting Sensing Polarity Bipolar    Lead Channel Setting Sensing Anode Location Right Atrium    Lead Channel Setting Sensing Anode Terminal Ring    Lead Channel Setting Sensing Cathode Location Right Atrium    Lead Channel Setting Sensing Cathode Terminal Tip    Lead Channel Setting Sensing Sensitivity 0.3    Lead Channel Setting Sensing Polarity Bipolar    Lead Channel Setting Sensing Anode Location Right Ventricle    Lead Channel Setting Sensing Anode Terminal Ring    Lead Channel Setting Sensing Cathode Location Right Ventricle    Lead Channel Setting Sensing Cathode Terminal Tip    Lead Channel Setting Sensing Sensitivity 0.3    Lead Channel Setting Pacing Polarity Bipolar    Lead Channel Setting Pacing Anode Location Right Atrium    Lead Channel Setting Pacing Anode Terminal Ring    Lead Channel Setting Sensing Cathode Location Right Atrium    Lead Channel Setting Sensing Cathode Terminal Tip    Lead Channel Setting Pacing Pulse Width 0.4    Lead Channel Setting Pacing Amplitude 1.5    Lead Channel Setting Pacing Capture Mode Adaptive    Lead Channel Setting Pacing Polarity Bipolar    Lead Channel Setting Pacing Anode Location Right Ventricle    Lead Channel Setting Pacing Anode Terminal Ring    Lead Channel Setting Sensing Cathode Location Right Ventricle    Lead Channel Setting Sensing Cathode Terminal Tip    Lead Channel Setting Pacing Pulse Width 1    Lead Channel Setting Pacing Amplitude 5    Lead Channel Setting Pacing Capture Mode Adaptive    Zone Setting  Type Category VF    Zone Setting Detection Interval 320    Zone Setting Detection Beats Numerator 30    Zone Setting Detection Beats Denominator 40    Zone Setting Type Category VT    Zone Setting Detection Interval 280    Zone Setting Type Category VT    Zone Setting Detection Interval 350    Zone Setting Type Category VT    Zone Setting Detection Interval 400    Zone Setting Type Category ATRIAL_FIBRILLATION    Zone Setting Type Category AT/AF    Zone Setting Detection Interval 350    Lead Channel Impedance Value 285    Lead Channel Sensing Intrinsic Amplitude 0.625    Lead Channel Pacing Threshold Amplitude 0.75    Lead Channel Pacing Threshold Pulse Width 0.4    Lead Channel Impedance Value 665    Lead Channel Impedance Value 551    Lead Channel Sensing Intrinsic Amplitude 0.75    Lead Channel Pacing Threshold Amplitude 2.75    Lead Channel Pacing Threshold Pulse Width 1    Battery Date Time of Measurements 20220712093725    Battery Status OK    Battery RRT Trigger 2.727    Battery Remaining Longevity 63    Battery Voltage 2.94    Capacitor Charge Type Reformation    Capacitor Last Charge Date Time [id number]    Capacitor Charge Time 3.973    Capacitor Charge Energy 18    Marcos Statistic Date Time Start 05893472942081    Marcos Statistic Date Time End 76932221330598    Marcos Statistic RA Percent Paced 0    Marcos Statistic RV Percent Paced 0    Marcos Statistic AP  Percent 0    Marcos Statistic AS  Percent 0    Marcos Statistic AP VS Percent 0    Marcos Statistic AS VS Percent 100    Atrial Tachy Statistic Date Time Start 65956263651215    Atrial Tachy Statistic Date Time End 20232954527158    Atrial Tachy Statistic AT/AF [place] Percent 0    Therapy Statistic Recent Shocks Delivered 0    Therapy Statistic Recent Shocks Aborted 0    Therapy Statistic Recent ATP Delivered 0    Therapy Statistic Recent Date Time Start 07017833714562    Therapy Statistic Recent Date Time End 39301266045140    Therapy Statistic  Total Shocks Delivered 1    Therapy Statistic Total Shocks Aborted 0    Therapy Statistic Total ATP Delivered 0    Therapy Statistic Total  Date Time Start 35277556776833    Therapy Statistic Total  Date Time End 61409478650193    Episode Statistic Recent Count 0    Episode Statistic Type Category AT/AF    Episode Statistic Recent Count 0    Episode Statistic Type Category SVT    Episode Statistic Recent Count 0    Episode Statistic Type Category VT    Episode Statistic Recent Count 0    Episode Statistic Type Category VF    Episode Statistic Recent Count 0    Episode Statistic Type Category VT    Episode Statistic Recent Count 0    Episode Statistic Type Category VT    Episode Statistic Recent Count 0    Episode Statistic Type Category VT    Episode Statistic Recent Date Time Start 96833471861767    Episode Statistic Recent Date Time End 45021053811826    Episode Statistic Recent Date Time Start 35657716152241    Episode Statistic Recent Date Time End 07085587103281    Episode Statistic Recent Date Time Start 57145617926748    Episode Statistic Recent Date Time End 86924944730763    Episode Statistic Recent Date Time Start 89645871972354    Episode Statistic Recent Date Time End 56077488519128    Episode Statistic Recent Date Time Start 40587932396726    Episode Statistic Recent Date Time End 12815557548749    Episode Statistic Recent Date Time Start 21552569012758    Episode Statistic Recent Date Time End 92642647100282    Episode Statistic Recent Date Time Start 34102388188549    Episode Statistic Recent Date Time End 40814454777053    Episode Statistic Total Count 33    Episode Statistic Type Category AT/AF    Episode Statistic Total Count 0    Episode Statistic Type Category SVT    Episode Statistic Total Count 9    Episode Statistic Type Category VT    Episode Statistic Total Count 1    Episode Statistic Type Category VF    Episode Statistic Total Count 0    Episode Statistic Type Category VT    Episode  Statistic Total Count 0    Episode Statistic Type Category VT    Episode Statistic Total Count 1    Episode Statistic Type Category VT    Episode Statistic Total Date Time Start 20180412151624    Episode Statistic Total Date Time End 20220712093538    Episode Statistic Total Date Time Start 20180412151624    Episode Statistic Total Date Time End 20220712093538    Episode Statistic Total Date Time Start 20180412151624    Episode Statistic Total Date Time End 20220712093538    Episode Statistic Total Date Time Start 20180412151624    Episode Statistic Total Date Time End 20220712093538    Episode Statistic Total Date Time Start 20180412151624    Episode Statistic Total Date Time End 20220712093538    Episode Statistic Total Date Time Start 20180412151624    Episode Statistic Total Date Time End 20220712093538    Episode Statistic Total Date Time Start 20180412151624    Episode Statistic Total Date Time End 20220712093538    Narrative    Patient seen on [facility] 4E for evaluation and iterative programming of their ICD per MD orders, post-op chest washout and closure.     Device: Crispy Gamer [id number] Evera XT   Normal device function. R Waves have drastically changed, today measuring 0.8 mV. RV threshold has also risen to 2.75 V @ 1 ms. RV impedance shows an increase in unipolar and unipolar.  Mode: AAI-DDD  bpm  AP: 0%  : 0%  Intrinsic Rhythm:  bpm  Short V-V intervals: 0  Lead Trends Appear Stable.   Estimated battery longevity to RRT = 5.2 years. Battery voltage = 2.95 V.   No Arrhythmias Recorded as Detection was ooff for surgery.  Setting Changes: ICD Tachy Detection and Therapies Turned ON.    Plan: Follow pt PRN while hospitalized  JESU Cueva RN    Dual lead ICD    I have reviewed and interpreted the device interrogation, settings, programming and nurse's summary. The device is functioning within normal device parameters. I agree with the current findings, assessment and plan.         6.  LINES/DRAINS/AIRWAY    Peripheral IV 07/05/22 Left;Anterior Upper forearm (Active)   Site Assessment WDL 07/14/22 0400   Line Status Saline locked;Checked every 1-2 hour 07/14/22 0400   Dressing Intervention New dressing  07/12/22 0000   Phlebitis Scale 0-->no symptoms 07/14/22 0400   Infiltration Scale 0 07/14/22 0400   If infiltrated, was a vesicant infusing? No 07/11/22 1800   Number of days: 9       PICC Triple Lumen 06/17/22 Right Brachial vein medial (Active)   Site Assessment WDL 07/14/22 0400   Dressing Intervention Chlorhexidine patch;Transparent 07/14/22 0400   Dressing Change Due 07/17/22 07/14/22 0400   PICC Comment from OSH 06/17/22 1700   Osborne - Status infusing 07/14/22 0400   Osborne - Cap Change Due 07/15/22 07/14/22 0400   Red - Status infusing 07/14/22 0400   Red - Cap Change Due 07/15/22 07/14/22 0400   White - Status saline locked 07/14/22 0400   White - Cap Change Due 07/15/22 07/14/22 0400   Extravasation? No 07/13/22 1200   Line Necessity Yes, meets criteria 07/14/22 0400   Number of days: 27       Introducer 07/08/22 Femoral Left (Active)   Specific Qualities Capped 07/14/22 0400   (Retired) Dressing Status Clean, dry, intact 07/14/22 0400   Dressing Intervention Dressing changed 07/10/22 0000   Dressing Change Due 07/17/22 07/14/22 0400   Number of days: 6       Arterial Line 07/08/22 Radial (Active)   Site Assessment WDL 07/14/22 0400   Line Status Pulsatile blood flow 07/14/22 0400   Arterine Line Cap Change Due 07/15/22 07/14/22 0400   Art Line Waveform Appropriate 07/14/22 0400   Art Line Interventions Leveled;Connections checked and tightened 07/14/22 0400   Color/Movement/Sensation Capillary refill less than 3 sec 07/14/22 0400   Line Necessity Yes, meets criteria 07/14/22 0400   Dressing Type Transparent 07/14/22 0400   Dressing Status Clean, dry, intact 07/14/22 0400   Dressing Intervention Dressing changed/new dressing 07/13/22 1600   Dressing Change Due 07/17/22 07/14/22 0400   Number  of days: 6       Venous Sheath 07/08/22 Other (comment) Right (Active)   Specific Qualities Sutured;Left in Place 07/13/22 1600   Site Assessment UTV 07/13/22 1600   Dressing Type Other (Comment) 07/13/22 1600   Dressing Intervention Dressing reinforced 07/13/22 0400   Venous Sheath Comment Protek Duo 07/13/22 1600   Number of days: 6       CVC Double Lumen Left Femoral (Active)   Site Assessment WDL 07/14/22 0400   Dressing Type Chlorhexidine disk;Transparent 07/14/22 0400   Dressing Status clean;dry;intact 07/14/22 0400   Dressing Intervention dressing changed 07/10/22 0000   Dressing Change Due 07/17/22 07/14/22 0400   Line Necessity yes, meets criteria 07/14/22 0400   Brown - Status saline locked 07/14/22 0400   Brown - Cap Change Due 07/15/22 07/14/22 0400   White - Status saline locked 07/14/22 0400   White - Cap Change Due 07/15/22 07/14/22 0400   Phlebitis Scale 0-->no symptoms 07/14/22 0400   Infiltration? no 07/14/22 0400   Infiltration Scale 0 07/14/22 0400   CVC Comment MAC 07/14/22 0400   Number of days: 6       ETT Cuffed Single 8 mm (Active)   Secured at (cm) 24 cm 07/14/22 0400   Measured from Lips 07/14/22 0400   Position Right 07/14/22 0400   Secured by Commercial tube partida 07/14/22 0400   Bite Block None Present 07/14/22 0400   Site Appearance Clean;Dry 07/14/22 0400   Tube Care Site care done 07/14/22 0400   Cuff Assessment Minimal leak technique 07/14/22 0400   Safety Measures Manual resuscitator/mask/valve in room;Manual resuscitator/PEEP valve in room 07/14/22 0400   Number of days: 6       Chest Tube 1 Anterior Mediastinal 9 English (Active)   Site Assessment UTV 07/14/22 0400   Suction -20 cm H2O 07/14/22 0400   Chest Tube Airleak No 07/14/22 0400   Drainage Description Dark red 07/14/22 0400   Dressing Status Normal: Clean, Dry & Intact 07/14/22 0400   Dressing Change Due 07/15/22 07/14/22 0400   Dressing Intervention Gauze 07/14/22 0400   Patency Intervention Stripped;Tip/Tilt 07/14/22  0400   Chest Tube Clamps at Bedside present 07/14/22 0400   Container Amount 23 07/14/22 0600   Output (ml) 23 ml 07/14/22 0600   Number of days: 6       Chest Tube 2 Anterior Mediastinal 9 [place] (Active)   Site Assessment UTV 07/14/22 0400   Suction -20 cm H2O 07/14/22 0400   Chest Tube Airleak No 07/14/22 0400   Drainage Description Dark red 07/14/22 0400   Dressing Status Normal: Clean, Dry & Intact 07/14/22 0400   Dressing Change Due 07/15/22 07/14/22 0400   Dressing Intervention Gauze 07/14/22 0400   Patency Intervention Stripped;Tip/Tilt 07/14/22 0400   Chest Tube Clamps at Bedside present 07/14/22 0400   Number of days: 6       Chest Tube 3 Pleural 32 [place] (Active)   Site Assessment UTV 07/14/22 0400   Suction -20 cm H2O 07/14/22 0400   Chest Tube Airleak No 07/14/22 0400   Drainage Description Serosanguinous 07/14/22 0400   Dressing Status Normal: Clean, Dry & Intact 07/14/22 0400   Dressing Change Due 07/15/22 07/14/22 0400   Dressing Intervention Gauze 07/14/22 0400   Patency Intervention Stripped;Tip/Tilt 07/14/22 0400   Chest Tube Clamps at Bedside present 07/14/22 0400   Container Amount 1300 07/14/22 0600   Output (ml) 0 ml 07/14/22 0600   Number of days: 6       Chest Tube 4 Posterior Pericardial 24 [place] (Active)   Site Assessment UTV 07/14/22 0400   Suction -20 cm H2O 07/14/22 0400   Chest Tube Airleak No 07/14/22 0400   Drainage Description Serosanguinous 07/14/22 0400   Dressing Status Normal: Clean, Dry & Intact 07/14/22 0400   Dressing Change Due 07/15/22 07/14/22 0400   Dressing Intervention Gauze 07/14/22 0400   Patency Intervention Stripped;Tip/Tilt 07/14/22 0400   Chest Tube Clamps at Bedside present 07/14/22 0400   Number of days: 6       NG/OG/NJ Tube Nasojejunal Right nostril (Active)   Site Description WDL 07/14/22 0400   Status Enteral Feedings 07/14/22 0400   Placement Confirmation [place] unchanged 07/14/22 0400   [place] (cm marking) at nare/mouth 113 cm 07/14/22 0400   Intake  (ml) 20 ml 07/13/22 2100   Flush/Free Water (mL) 30 mL 07/14/22 0400   Number of days: 3       NG/OG/NJ Tube Orogastric Right mouth (Active)   Site Description WDL 07/14/22 0000   Status Suction-low intermittent 07/14/22 0400   Drainage Appearance Green 07/14/22 0400   Placement Confirmation [place] unchanged 07/14/22 0400   [place] (cm marking) at nare/mouth 64 cm 07/14/22 0000   Flush/Free Water (mL) 30 mL 07/13/22 1600   Container Amount 120 mL 07/14/22 0600   Output (ml) 0 ml 07/14/22 0600   Number of days: 2       Rectal Tube With balloon (Active)   Balloon fill volume  45 cc 07/14/22 0400   Stool Leakage None 07/14/22 0400   Rectal Tube Container Volume 100 ml 07/14/22 0600   Rectal Tube Output 0 ml 07/14/22 0600   Number of days: 1       Urethral Catheter 07/08/22 Coude;Latex;Temperature probe;Triple-lumen 16 fr (Active)   Tube Description Positional 07/14/22 0400   Catheter Care Done;Catheter wipes 07/13/22 2000   Collection Container Standard 07/14/22 0400   Securement Method Securing device (Describe) 07/14/22 0400   Rationale for Continued Use Strict 1-2 Hour I&O 07/14/22 0400   Urine Output 25 mL 07/14/22 0600   Number of days: 6       Wound Face Pressure injury hospital acquired Stage 1 (Active)   Wound Bed Erythema, non-blanchable, skin intact 07/14/22 0400   Estimated Circumference ( if not measured quarter sized 07/14/22 0400   Drainage Amount None 07/14/22 0400   Dressing Foam 07/14/22 0400   Dressing Status Clean, dry, intact 07/14/22 0400   Number of days: 1       Incision/Surgical Site 06/23/22 Right Chest (Active)   Incision Assessment WDL 07/13/22 0400   Deirdre-Incision Assessment UTV 07/12/22 0800   Closure Liquid bandage;Approximated 07/13/22 0400   Incision Drainage Amount UTV 07/11/22 1200   Drainage Description UTV 07/11/22 1200   Incision Care Wound cleanser 07/13/22 0400   Dressing Intervention Clean, dry, intact 07/13/22 0400   Number of days: 21       Incision/Surgical Site 07/08/22  Midline Chest (Active)   Incision Assessment UTV 07/13/22 1600   Deirdre-Incision Assessment UTV 07/13/22 1200   Closure Other (Comment) 07/13/22 1600   Incision Drainage Amount Scant 07/11/22 1200   Drainage Description Sanguinous 07/11/22 0400   Incision Care Other (Comment) 07/10/22 0400   Dressing Intervention Clean, dry, intact 07/13/22 1600   Number of days: 6       Incision/Surgical Site 07/13/22 Chest (Active)   Number of days: 1       Incision/Surgical Site 07/13/22 Bilateral Chest (Active)   Closure Sutures 07/13/22 1805   Number of days: 1       Incision/Surgical Site 07/13/22 Medial Sternum (Active)   Incision Assessment WDL 07/14/22 0400   Deirdre-Incision Assessment Pale 07/14/22 0400   Closure Other (Comment) 07/14/22 0400   Incision Drainage Amount None 07/14/22 0400   Incision Care Therapy - negative pressure 07/14/22 0400   Number of days: 1          ====================================================

## 2022-07-14 NOTE — PROCEDURES
[facility]    Arterial line placement    Date/Time: 7/14/2022 5:29 PM  Performed by: Medhat Rosenthal MD  Authorized by: Medhat Rosenthal MD       UNIVERSAL PROTOCOL   Site Marked: NA  Prior Images Obtained and Reviewed:  NA  Required items: Required blood products, implants, devices and special equipment available    Patient identity confirmed:  Arm band  NA - No sedation, light sedation, or local anesthesia  Confirmation Checklist:  Patient's identity using two indicators and relevant allergies  Universal Protocol: the Joint Commission Universal Protocol was followed    Preparation: Patient was prepped and draped in usual sterile fashion    ESBL (mL):  1  Indication: multiple ABGs hemodynamic monitoring  Location: right radial      SEDATION  Patient Sedated: Yes    Sedation Type:  Moderate (conscious) sedation  Sedation:  Fentanyl and propofol  Vital signs: Vital signs monitored during sedation      PROCEDURE DETAILS  Silvestre's Test Normal?: Silvestre's test not abnormal  Needle Gauge:  18  Seldinger technique: Seldinger technique used    Number of Attempts:  2  Post-procedure:  Dressing applied and line sutured  CMS: normal    PROCEDURE    Length of time physician/provider present for 1:1 monitoring during sedation: 10

## 2022-07-14 NOTE — PROGRESS NOTES
VAD Shift Summary:        VAD Equipment:  Console serial number: Primary [id number], secondary [id number]  Circuit Lot number: [id number]  Pump head lot number: [id number]         Patient remains on RVAD, all equipment is functioning and alarms are appropriately set. RPM's 4050 with flow range 3.3 L/min. Circuit remains free of air, clot and fibrin. Cannulas are secure with no bleeding from site. Extremities are warm to touch.      Significant Shift Events: None.    Vent settings:  Vent Mode: CMV/AC  (Continuous Mandatory Ventilation/ Assist Control)  FiO2 (%): 30 %  Resp Rate (Set): 12 breaths/min  Tidal Volume (Set, mL): 450 mL  PEEP (cm H2O): 5 cmH2O  Pressure Support (cm H2O): 7 cmH2O  Resp: 13  .    Anticoagulation is off.    No product given included.       Intake/Output Summary (Last 24 hours) at 7/14/2022 0617  Last data filed at 7/14/2022 0600  Gross per 24 hour   Intake 2999.01 ml   Output 2277 ml   Net 722.01 ml         CXR:  Recent Results (from the past 24 hour(s))   Cardiac Device Check - Inpatient   Result Value    Date Time Interrogation Session [id number]    Implantable Pulse Generator  Medtronic    Implantable Pulse Generator Model [id number] Eliseo TIRADO DR    Implantable Pulse Generator Serial Number [id number]    Type Interrogation Session In Clinic    Clinic Name [facility]    Implantable Pulse Generator Type Defibrillator    Implantable Pulse Generator Implant Date 20180412    Implantable Lead  Medtronic    Implantable Lead Model 5076 CapSureFix Novus    Implantable Lead Serial Number [id number]    Implantable Lead Implant Date 20060306    Implantable Lead Polarity Type Bipolar Lead    Implantable Lead Location Detail 1 UNKNOWN    Implantable Lead Location Right Atrium    Implantable Lead  Medtronic    Implantable Lead Model 6949 [employer] [place]    Implantable Lead Serial Number [id number]    Implantable Lead Implant Date 20060306     Implantable Lead Polarity Type Bipolar Lead    Implantable Lead Location Detail 1 UNKNOWN    Implantable Lead Location Right Ventricle    Marcos Setting Mode (NBG Code) MVP_AAI_DDD    Marcos Setting Lower Rate Limit 50    Marcos Setting Maximum Tracking Rate 130    Marcos Setting Maximum Sensor Rate 115    Marcos Setting Hysterisis Rate DISABLED    Marcos Setting TRISTEN Delay Low 350    Marcos Setting PAV Delay Low 350    Marcos Setting AT Mode Switch Rate 171    Lead Channel Setting Sensing Polarity Bipolar    Lead Channel Setting Sensing Anode Location Right Atrium    Lead Channel Setting Sensing Anode Terminal Ring    Lead Channel Setting Sensing Cathode Location Right Atrium    Lead Channel Setting Sensing Cathode Terminal Tip    Lead Channel Setting Sensing Sensitivity 0.3    Lead Channel Setting Sensing Polarity Bipolar    Lead Channel Setting Sensing Anode Location Right Ventricle    Lead Channel Setting Sensing Anode Terminal Ring    Lead Channel Setting Sensing Cathode Location Right Ventricle    Lead Channel Setting Sensing Cathode Terminal Tip    Lead Channel Setting Sensing Sensitivity 0.3    Lead Channel Setting Pacing Polarity Bipolar    Lead Channel Setting Pacing Anode Location Right Atrium    Lead Channel Setting Pacing Anode Terminal Ring    Lead Channel Setting Sensing Cathode Location Right Atrium    Lead Channel Setting Sensing Cathode Terminal Tip    Lead Channel Setting Pacing Pulse Width 0.4    Lead Channel Setting Pacing Amplitude 2    Lead Channel Setting Pacing Capture Mode Adaptive    Lead Channel Setting Pacing Polarity Bipolar    Lead Channel Setting Pacing Anode Location Right Ventricle    Lead Channel Setting Pacing Anode Terminal Ring    Lead Channel Setting Sensing Cathode Location Right Ventricle    Lead Channel Setting Sensing Cathode Terminal Tip    Lead Channel Setting Pacing Pulse Width 1    Lead Channel Setting Pacing Amplitude 5    Lead Channel Setting Pacing Capture Mode Adaptive     Zone Setting Type Category VF    Zone Setting Detection Interval 320    Zone Setting Detection Beats Numerator 30    Zone Setting Detection Beats Denominator 40    Zone Setting Type Category VT    Zone Setting Detection Interval 280    Zone Setting Type Category VT    Zone Setting Detection Interval 350    Zone Setting Type Category VT    Zone Setting Detection Interval 400    Zone Setting Type Category ATRIAL_FIBRILLATION    Zone Setting Type Category AT/AF    Zone Setting Detection Interval 350    Lead Channel Impedance Value 285    Lead Channel Sensing Intrinsic Amplitude 0.4    Lead Channel Pacing Threshold Amplitude 0.75    Lead Channel Pacing Threshold Pulse Width 0.4    Lead Channel Impedance Value 722    Lead Channel Impedance Value 646    Lead Channel Sensing Intrinsic Amplitude 1    Lead Channel Pacing Threshold Amplitude 3.00    Lead Channel Pacing Threshold Pulse Width 1.0    Battery Date Time of Measurements 81126183325558    Battery Status OK    Battery RRT Trigger 2.727    Battery Remaining Longevity 63    Battery Voltage 2.91    Capacitor Charge Type Reformation    Capacitor Last Charge Date Time 86003732009185    Capacitor Charge Time 3.973    Capacitor Charge Energy 18    Marcos Statistic Date Time Start 38213392867548    Marcos Statistic Date Time End 03264132659037    Marcos Statistic RA Percent Paced 18.52    Marcos Statistic RV Percent Paced 18.6    Marcos Statistic AP  Percent 18.49    Marcos Statistic AS  Percent 0.19    Marcos Statistic AP VS Percent 0.05    Marcos Statistic AS VS Percent 81.27    Atrial Tachy Statistic Date Time Start 94884816670422    Atrial Tachy Statistic Date Time End 92291536924295    Atrial Tachy Statistic AT/AF Battery Park Percent 0    Therapy Statistic Recent Shocks Delivered 0    Therapy Statistic Recent Shocks Aborted 0    Therapy Statistic Recent ATP Delivered 0    Therapy Statistic Recent Date Time Start 57337464834133    Therapy Statistic Recent Date Time End  56610282002024    Therapy Statistic Total Shocks Delivered 1    Therapy Statistic Total Shocks Aborted 0    Therapy Statistic Total ATP Delivered 0    Therapy Statistic Total  Date Time Start 46806958142055    Therapy Statistic Total  Date Time End [id number]    Episode Statistic Recent Count 0    Episode Statistic Type Category AT/AF    Episode Statistic Recent Count 0    Episode Statistic Type Category SVT    Episode Statistic Recent Count 0    Episode Statistic Type Category VT    Episode Statistic Recent Count 0    Episode Statistic Type Category VF    Episode Statistic Recent Count 0    Episode Statistic Type Category VT    Episode Statistic Recent Count 0    Episode Statistic Type Category VT    Episode Statistic Recent Count 0    Episode Statistic Type Category VT    Episode Statistic Recent Date Time Start 65940130383260    Episode Statistic Recent Date Time End 81064561727769    Episode Statistic Recent Date Time Start 77841811409392    Episode Statistic Recent Date Time End 27574032010250    Episode Statistic Recent Date Time Start 98824347073100    Episode Statistic Recent Date Time End 01837045999747    Episode Statistic Recent Date Time Start 41853542327066    Episode Statistic Recent Date Time End 69538931535947    Episode Statistic Recent Date Time Start 72457340722733    Episode Statistic Recent Date Time End 97819509679985    Episode Statistic Recent Date Time Start 50096811947020    Episode Statistic Recent Date Time End 87590819199172    Episode Statistic Recent Date Time Start 54766980364238    Episode Statistic Recent Date Time End 47296399796688    Episode Statistic Total Count 33    Episode Statistic Type Category AT/AF    Episode Statistic Total Count 0    Episode Statistic Type Category SVT    Episode Statistic Total Count 9    Episode Statistic Type Category VT    Episode Statistic Total Count 1    Episode Statistic Type Category VF    Episode Statistic Total Count 0    Episode  Statistic Type Category VT    Episode Statistic Total Count 0    Episode Statistic Type Category VT    Episode Statistic Total Count 1    Episode Statistic Type Category VT    Episode Statistic Total Date Time Start 20180412151624    Episode Statistic Total Date Time End 70292695143478    Episode Statistic Total Date Time Start 20180412151624    Episode Statistic Total Date Time End 90273100954693    Episode Statistic Total Date Time Start 81578124158549    Episode Statistic Total Date Time End 55546621128712    Episode Statistic Total Date Time Start 20180412151624    Episode Statistic Total Date Time End 20220713074616    Episode Statistic Total Date Time Start 20180412151624    Episode Statistic Total Date Time End 31833803718186    Episode Statistic Total Date Time Start 20180412151624    Episode Statistic Total Date Time End 20220713074616    Episode Statistic Total Date Time Start 20180412151624    Episode Statistic Total Date Time End 20220713074616    Narrative    Patient seen in [address] [place] [address] for evaluation and iterative programming of their   ICD per MD orders to verify ICD settings s/p chest wash out last evening.    Device: [employer] [id number] Evera XT   Normal Device Function.  Mode: DDDR /130 --> AAI<=>DDD /130 bpm  AP: 18.5%  : 18.6%  Intrinsic rhythm: ST @ 105 bpm  Short V-V intervals: 0  Thoracic Impedance: Below reference line, suggesting possible fluid   accumulation.  Lead Trends Appear Stable: RV impedance (both bipolar and tip to coil)   continues to trend up. RV capture threshold remains elevated, today   measures 3.0 V @ 1.0 ms. R-wave amplitude remains small, measuring at 1.0   mV today. CVTS aware.  Estimated battery longevity to RRT = 5.2 years. Battery voltage = 2.91 V.  Atrial arrhythmia: Device records 1 AT/AF episode < 1 min in duration for   total time in AT/AF = 3 seconds. No EGM available for further review.  AF burden: <0.1%  Anticoagulant: Heparin gtt currently held per  provider orders  Ventricular Arrhythmia: N/R due to VT Monitor turned off.  Setting changes: Mode changed from DDDR to AAI<=>DDD. VT detection zone   (171-188 bpm) turned OFF. VT Monitor zone (150 bpm) turned ON. These   changes were made to restore ICD settings to baseline programming after   post-op programming yesterday evening by Cardiology Fellow. VF detection   zone (>188 bpm) and FVT detection zone (via VF) remain programmed ON, per   previous settings.    Plan: Continue inpatient evaluation and treatment.  RICKIE Mcpherson RN    Dual lead ICD    I have reviewed and interpreted the device interrogation, settings,   programming and nurse's summary. The device is functioning within normal   device parameters. I agree with the current findings, assessment and plan.   XR Chest Port 1 View    Narrative    Chest radiograph  7/13/2022 6:20 PM      HISTORY: Open chest closure    COMPARISON: 7/13/2022    FINDINGS:   Single AP view of the chest. Postoperative changes of the LVAD.  Endotracheal tip over the mid thoracic trachea. Enteric and feeding  tubes course beyond the field-of-view. Stable left chest wall ICD  leads. Stable right IJ RVAD. Stable right arm PICC tip. Left basilar  chest tube and mediastinal drains. Vascular closure device over the  right axilla. Esophageal temperature probe overlying the low left  neck. Interval removal of mediastinal surgical sponge. No definite  radiopaque retained objects. Stable mediastinum. No pneumothorax.  Trace pleural effusions. Similar perihilar and decreased left greater  than right interstitial and airspace opacities.      Impression    IMPRESSION:   No definite radiopaque retained objects following chest closure.    The above findings were discussed with Shraddha Campos from the OR by  Dr. Hirsch on 7/13/2022 at 6:26 PM.    I have personally reviewed the examination and initial interpretation  and I agree with the findings.    SHAQ PRABHAKAR MD         SYSTEM ID:   [id number]       Labs:  Recent Labs   Lab 07/14/22  0337 07/14/22  0113 07/13/22 2121 07/13/22  1932   PH 7.41 7.40 7.35 7.36   PCO2 36 35 39 35   PO2 126* 116* 108* 109*   HCO3 23 22 21 20*   O2PER 30 30 30 30       Lab Results   Component Value Date    HGB 9.2 (L) 07/14/2022    PHGB 80 (H) 07/14/2022    PLT 93 (L) 07/14/2022    FIBR 435 07/14/2022    INR 1.21 (H) 07/14/2022    PTT 43 (H) 07/14/2022    DD 2.18 (H) 07/14/2022    ANTCH 92 07/13/2022         Plan is to remain on RVAD support and to possibly start Heparin today.      Joseph Howard, RT  ECMO Specialist  7/14/2022 6:17 AM

## 2022-07-14 NOTE — PROGRESS NOTES
VAD Shift Summary: 07:00-15:00      VAD Equipment:  Console serial number: Primary [id number], secondary [id number]  Circuit Lot number: [id number]  Pump head lot number: [id number]       Patient remains on RVAD, all equipment is functioning and alarms are appropriately set. RPM's 4050 with flow range 3.25-3.3 L/min. Circuit remains free of air, clot and fibrin. Cannulas are secure with no bleeding from site. Extremities are warm to touch.     Significant Shift Events: none    Vent settings:  Vent Mode: CMV/AC  (Continuous Mandatory Ventilation/ Assist Control)  FiO2 (%): 30 %  Resp Rate (Set): 12 breaths/min  Tidal Volume (Set, mL): 450 mL  PEEP (cm H2O): 5 cmH2O  Pressure Support (cm H2O): 7 cmH2O  Resp: 26  .    Anticoagulation is heparin running at straight rate 400 u/hr    Urine output is as charted, blood loss was minimal. No product given.       Intake/Output Summary (Last 24 hours) at 7/14/2022 1448  Last data filed at 7/14/2022 1400  Gross per 24 hour   Intake 3350.37 ml   Output 2473 ml   Net 877.37 ml       ECHO:  No results found for this or any previous visit.  No results found for this or any previous visit.      CXR:  Recent Results (from the past 24 hour(s))   XR Chest Port 1 View    Narrative    Chest radiograph  7/13/2022 6:20 PM      HISTORY: Open chest closure    COMPARISON: 7/13/2022    FINDINGS:   Single AP view of the chest. Postoperative changes of the LVAD.  Endotracheal tip over the mid thoracic trachea. Enteric and feeding  tubes course beyond the field-of-view. Stable left chest wall ICD  leads. Stable right IJ RVAD. Stable right arm PICC tip. Left basilar  chest tube and mediastinal drains. Vascular closure device over the  right axilla. Esophageal temperature probe overlying the low left  neck. Interval removal of mediastinal surgical sponge. No definite  radiopaque retained objects. Stable mediastinum. No pneumothorax.  Trace pleural effusions. Similar perihilar and decreased left  greater  than right interstitial and airspace opacities.      Impression    IMPRESSION:   No definite radiopaque retained objects following chest closure.    The above findings were discussed with Shraddha Campos from the OR by  Dr. Hirsch on 7/13/2022 at 6:26 PM.    I have personally reviewed the examination and initial interpretation  and I agree with the findings.    SHAQ PRABHAKAR MD         SYSTEM ID:  [id number]   XR Chest Port 1 View    Narrative    EXAM: XR CHEST PORT 1 VIEW  7/14/2022 1:24 AM     HISTORY:  RVAD/LVAD/ETT       COMPARISON:  7/13/2022    FINDINGS: Single view of the chest. Endotracheal tube tip projects  over the midthoracic trachea. Esophageal temperature probe projects  over the proximal esophagus. Enteric tubes course below the diaphragm  and out of the field of view. Right IJ RVAD tip projects over the main  pulmonary trunk. Stable left chest wall cardiac device and LVAD.  Stable mediastinal drains and left basilar chest tube.    Stable enlarged cardiac silhouette. Small bilateral pleural effusions.  No appreciable pneumothorax. Similar perihilar and bibasilar pulmonary  opacities.      Impression    IMPRESSION:   1. Stable support devices.  2. No significant change in small pleural effusions and bilateral  pulmonary opacities.    I have personally reviewed the examination and initial interpretation  and I agree with the findings.    LAI HERNADEZ MD         SYSTEM ID:  [id number]       Labs:  Recent Labs   Lab 07/14/22  1145 07/14/22  0804 07/14/22  0337 07/14/22  0113   PH 7.49* 7.41 7.41 7.40   PCO2 27* 35 36 35   PO2 91 121* 126* 116*   HCO3 20* 23 23 22   O2PER 30 30 30 30       Lab Results   Component Value Date    HGB 8.2 (L) 07/14/2022    HGB 8.2 (L) 07/14/2022    PHGB 80 (H) 07/14/2022     (L) 07/14/2022     (L) 07/14/2022    FIBR 435 07/14/2022    INR 1.18 (H) 07/14/2022    PTT 43 (H) 07/14/2022    DD 2.18 (H) 07/14/2022    ANTCH 93 07/14/2022         Plan is to  remain on RVAD support at this time.      Daniel Hager, RT  ECMO Specialist  7/14/2022 2:48 PM

## 2022-07-14 NOTE — PLAN OF CARE
.Major Shift Events:    Neuro: Pt sedated, does withdraw all extremities. At 0000 propofol paused, pt was able to follow commands and move al extremities spontaneously. Pupils 2 mm round, brisk, equal.    CV: HR 90-108s, afebrile. RVAD numbers WNL  Resp:CMV 30%, 12, 450, 5. Minimal ETT secretions. Oral secretions red. LS diminished   GI: OG @ LIS, TF 50ml/hr running through NJ. Rectal tube in place, no output.   : Fitzgerald in place 15-60 ml/hr   Skin: Pressure injury on R temporal optifoam in place.   Small scabs on R/L chest.   IV/Drips:  R femoral art line removed   L fenoral double CVC  R triple lumen PICC  L PIV   Propofol 45 mcg/kg/min  Fent 150 mcg/hr  Insulin 3 unit/hr  Plan: Monitor hemodynamics and RVAD numbers.    For vital signs and complete assessments, please see documentation flowsheets.

## 2022-07-15 ENCOUNTER — APPOINTMENT (OUTPATIENT)
Dept: GENERAL RADIOLOGY | Facility: CLINIC | Age: 62
DRG: 001 | End: 2022-07-15
Attending: STUDENT IN AN ORGANIZED HEALTH CARE EDUCATION/TRAINING PROGRAM
Payer: COMMERCIAL

## 2022-07-15 ENCOUNTER — ANCILLARY PROCEDURE (OUTPATIENT)
Dept: CARDIOLOGY | Facility: CLINIC | Age: 62
DRG: 001 | End: 2022-07-15
Attending: STUDENT IN AN ORGANIZED HEALTH CARE EDUCATION/TRAINING PROGRAM
Payer: COMMERCIAL

## 2022-07-15 ENCOUNTER — APPOINTMENT (OUTPATIENT)
Dept: GENERAL RADIOLOGY | Facility: CLINIC | Age: 62
DRG: 001 | End: 2022-07-15
Attending: SURGERY
Payer: COMMERCIAL

## 2022-07-15 LAB
ALBUMIN SERPL BCG-MCNC: 2.3 G/DL (ref 3.5–5.2)
ALT SERPL W P-5'-P-CCNC: 20 U/L (ref 10–50)
ANION GAP SERPL CALCULATED.3IONS-SCNC: 10 MMOL/L (ref 7–15)
ANION GAP SERPL CALCULATED.3IONS-SCNC: 10 MMOL/L (ref 7–15)
ANION GAP SERPL CALCULATED.3IONS-SCNC: 12 MMOL/L (ref 7–15)
ANION GAP SERPL CALCULATED.3IONS-SCNC: 9 MMOL/L (ref 7–15)
ANION GAP SERPL CALCULATED.3IONS-SCNC: 9 MMOL/L (ref 7–15)
APTT PPP: 141 SECONDS (ref 22–38)
APTT PPP: 53 SECONDS (ref 22–38)
APTT PPP: 58 SECONDS (ref 22–38)
APTT PPP: 61 SECONDS (ref 22–38)
AT III ACT/NOR PPP CHRO: 91 % (ref 85–135)
ATRIAL RATE - MUSE: 138 BPM
ATRIAL RATE - MUSE: 208 BPM
BACTERIA BLD CULT: NO GROWTH
BASE EXCESS BLDA CALC-SCNC: -0.8 MMOL/L (ref -9–1.8)
BASE EXCESS BLDA CALC-SCNC: -1.7 MMOL/L (ref -9–1.8)
BASE EXCESS BLDA CALC-SCNC: -1.8 MMOL/L (ref -9–1.8)
BASE EXCESS BLDA CALC-SCNC: -1.9 MMOL/L (ref -9–1.8)
BASE EXCESS BLDA CALC-SCNC: -2.1 MMOL/L (ref -9–1.8)
BASE EXCESS BLDA CALC-SCNC: -2.3 MMOL/L (ref -9–1.8)
BASOPHILS # BLD MANUAL: 0 10E3/UL (ref 0–0.2)
BASOPHILS # BLD MANUAL: 0.2 10E3/UL (ref 0–0.2)
BASOPHILS NFR BLD MANUAL: 0 %
BASOPHILS NFR BLD MANUAL: 1 %
BLD PROD TYP BPU: NORMAL
BLD PROD TYP BPU: NORMAL
BLOOD COMPONENT TYPE: NORMAL
BLOOD COMPONENT TYPE: NORMAL
BUN SERPL-MCNC: 24.7 MG/DL (ref 8–23)
BUN SERPL-MCNC: 26.3 MG/DL (ref 8–23)
BUN SERPL-MCNC: 27.2 MG/DL (ref 8–23)
BUN SERPL-MCNC: 27.6 MG/DL (ref 8–23)
BUN SERPL-MCNC: 28.6 MG/DL (ref 8–23)
CA-I BLD-MCNC: 2.2 MG/DL (ref 4.4–5.2)
CA-I BLD-MCNC: 4.3 MG/DL (ref 4.4–5.2)
CA-I BLD-MCNC: 4.6 MG/DL (ref 4.4–5.2)
CA-I BLD-MCNC: 4.8 MG/DL (ref 4.4–5.2)
CALCIUM SERPL-MCNC: 7.3 MG/DL (ref 8.8–10.2)
CALCIUM SERPL-MCNC: 7.8 MG/DL (ref 8.8–10.2)
CALCIUM SERPL-MCNC: 7.9 MG/DL (ref 8.8–10.2)
CALCIUM SERPL-MCNC: 7.9 MG/DL (ref 8.8–10.2)
CALCIUM SERPL-MCNC: 8 MG/DL (ref 8.8–10.2)
CHLORIDE SERPL-SCNC: 104 MMOL/L (ref 98–107)
CHLORIDE SERPL-SCNC: 104 MMOL/L (ref 98–107)
CHLORIDE SERPL-SCNC: 106 MMOL/L (ref 98–107)
CHLORIDE SERPL-SCNC: 107 MMOL/L (ref 98–107)
CHLORIDE SERPL-SCNC: 110 MMOL/L (ref 98–107)
CODING SYSTEM: NORMAL
CODING SYSTEM: NORMAL
CREAT SERPL-MCNC: 0.51 MG/DL (ref 0.67–1.17)
CREAT SERPL-MCNC: 0.57 MG/DL (ref 0.67–1.17)
CREAT SERPL-MCNC: 0.58 MG/DL (ref 0.67–1.17)
CREAT SERPL-MCNC: 0.58 MG/DL (ref 0.67–1.17)
CREAT SERPL-MCNC: 0.62 MG/DL (ref 0.67–1.17)
CROSSMATCH: NORMAL
CROSSMATCH: NORMAL
D DIMER PPP FEU-MCNC: 2.34 UG/ML FEU (ref 0–0.5)
D DIMER PPP FEU-MCNC: 2.58 UG/ML FEU (ref 0–0.5)
DEPRECATED HCO3 PLAS-SCNC: 17 MMOL/L (ref 22–29)
DEPRECATED HCO3 PLAS-SCNC: 18 MMOL/L (ref 22–29)
DEPRECATED HCO3 PLAS-SCNC: 19 MMOL/L (ref 22–29)
DEPRECATED HCO3 PLAS-SCNC: 19 MMOL/L (ref 22–29)
DEPRECATED HCO3 PLAS-SCNC: 21 MMOL/L (ref 22–29)
DIASTOLIC BLOOD PRESSURE - MUSE: NORMAL MMHG
DIASTOLIC BLOOD PRESSURE - MUSE: NORMAL MMHG
ELLIPTOCYTES BLD QL SMEAR: SLIGHT
EOSINOPHIL # BLD MANUAL: 0 10E3/UL (ref 0–0.7)
EOSINOPHIL # BLD MANUAL: 0 10E3/UL (ref 0–0.7)
EOSINOPHIL # BLD MANUAL: 0.2 10E3/UL (ref 0–0.7)
EOSINOPHIL # BLD MANUAL: 0.2 10E3/UL (ref 0–0.7)
EOSINOPHIL # BLD MANUAL: 0.4 10E3/UL (ref 0–0.7)
EOSINOPHIL NFR BLD MANUAL: 0 %
EOSINOPHIL NFR BLD MANUAL: 0 %
EOSINOPHIL NFR BLD MANUAL: 1 %
EOSINOPHIL NFR BLD MANUAL: 1 %
EOSINOPHIL NFR BLD MANUAL: 2 %
ERYTHROCYTE [DISTWIDTH] IN BLOOD BY AUTOMATED COUNT: 17.2 % (ref 10–15)
ERYTHROCYTE [DISTWIDTH] IN BLOOD BY AUTOMATED COUNT: 17.4 % (ref 10–15)
ERYTHROCYTE [DISTWIDTH] IN BLOOD BY AUTOMATED COUNT: 17.5 % (ref 10–15)
ERYTHROCYTE [DISTWIDTH] IN BLOOD BY AUTOMATED COUNT: 17.6 % (ref 10–15)
ERYTHROCYTE [DISTWIDTH] IN BLOOD BY AUTOMATED COUNT: 17.8 % (ref 10–15)
FIBRINOGEN PPP-MCNC: 510 MG/DL (ref 170–490)
FIBRINOGEN PPP-MCNC: 528 MG/DL (ref 170–490)
GFR SERPL CREATININE-BSD FRML MDRD: >90 ML/MIN/1.73M2
GLUCOSE BLDC GLUCOMTR-MCNC: 122 MG/DL (ref 70–99)
GLUCOSE BLDC GLUCOMTR-MCNC: 127 MG/DL (ref 70–99)
GLUCOSE BLDC GLUCOMTR-MCNC: 128 MG/DL (ref 70–99)
GLUCOSE BLDC GLUCOMTR-MCNC: 130 MG/DL (ref 70–99)
GLUCOSE BLDC GLUCOMTR-MCNC: 131 MG/DL (ref 70–99)
GLUCOSE BLDC GLUCOMTR-MCNC: 140 MG/DL (ref 70–99)
GLUCOSE BLDC GLUCOMTR-MCNC: 158 MG/DL (ref 70–99)
GLUCOSE BLDC GLUCOMTR-MCNC: 192 MG/DL (ref 70–99)
GLUCOSE SERPL-MCNC: 119 MG/DL (ref 70–99)
GLUCOSE SERPL-MCNC: 140 MG/DL (ref 70–99)
GLUCOSE SERPL-MCNC: 145 MG/DL (ref 70–99)
GLUCOSE SERPL-MCNC: 150 MG/DL (ref 70–99)
GLUCOSE SERPL-MCNC: 208 MG/DL (ref 70–99)
HCO3 BLD-SCNC: 21 MMOL/L (ref 21–28)
HCO3 BLD-SCNC: 21 MMOL/L (ref 21–28)
HCO3 BLD-SCNC: 22 MMOL/L (ref 21–28)
HCO3 BLD-SCNC: 23 MMOL/L (ref 21–28)
HCT VFR BLD AUTO: 20.7 % (ref 40–53)
HCT VFR BLD AUTO: 22.2 % (ref 40–53)
HCT VFR BLD AUTO: 22.5 % (ref 40–53)
HCT VFR BLD AUTO: 23.7 % (ref 40–53)
HCT VFR BLD AUTO: 24.3 % (ref 40–53)
HGB BLD-MCNC: 6.7 G/DL (ref 13.3–17.7)
HGB BLD-MCNC: 7.2 G/DL (ref 13.3–17.7)
HGB BLD-MCNC: 7.4 G/DL (ref 13.3–17.7)
HGB BLD-MCNC: 7.7 G/DL (ref 13.3–17.7)
HGB BLD-MCNC: 7.9 G/DL (ref 13.3–17.7)
HGB FREE PLAS-MCNC: 70 MG/DL
HOLD SPECIMEN: NORMAL
HOLD SPECIMEN: NORMAL
INR PPP: 1.16 (ref 0.85–1.15)
INR PPP: 1.23 (ref 0.85–1.15)
INR PPP: 1.23 (ref 0.85–1.15)
INTERPRETATION ECG - MUSE: NORMAL
INTERPRETATION ECG - MUSE: NORMAL
ISSUE DATE AND TIME: NORMAL
ISSUE DATE AND TIME: NORMAL
LACTATE SERPL-SCNC: 1.3 MMOL/L (ref 0.7–2)
LACTATE SERPL-SCNC: 1.4 MMOL/L (ref 0.7–2)
LACTATE SERPL-SCNC: 1.4 MMOL/L (ref 0.7–2)
LACTATE SERPL-SCNC: 2.4 MMOL/L (ref 0.7–2)
LYMPHOCYTES # BLD MANUAL: 0.2 10E3/UL (ref 0.8–5.3)
LYMPHOCYTES # BLD MANUAL: 0.5 10E3/UL (ref 0.8–5.3)
LYMPHOCYTES # BLD MANUAL: 0.7 10E3/UL (ref 0.8–5.3)
LYMPHOCYTES # BLD MANUAL: 1.3 10E3/UL (ref 0.8–5.3)
LYMPHOCYTES # BLD MANUAL: 1.7 10E3/UL (ref 0.8–5.3)
LYMPHOCYTES NFR BLD MANUAL: 1 %
LYMPHOCYTES NFR BLD MANUAL: 2 %
LYMPHOCYTES NFR BLD MANUAL: 4 %
LYMPHOCYTES NFR BLD MANUAL: 8 %
LYMPHOCYTES NFR BLD MANUAL: 8 %
MAGNESIUM SERPL-MCNC: 2.2 MG/DL (ref 1.7–2.3)
MAGNESIUM SERPL-MCNC: 2.3 MG/DL (ref 1.7–2.3)
MCH RBC QN AUTO: 28.7 PG (ref 26.5–33)
MCH RBC QN AUTO: 28.7 PG (ref 26.5–33)
MCH RBC QN AUTO: 28.9 PG (ref 26.5–33)
MCH RBC QN AUTO: 29.1 PG (ref 26.5–33)
MCH RBC QN AUTO: 29.1 PG (ref 26.5–33)
MCHC RBC AUTO-ENTMCNC: 32.4 G/DL (ref 31.5–36.5)
MCHC RBC AUTO-ENTMCNC: 32.4 G/DL (ref 31.5–36.5)
MCHC RBC AUTO-ENTMCNC: 32.5 G/DL (ref 31.5–36.5)
MCHC RBC AUTO-ENTMCNC: 32.5 G/DL (ref 31.5–36.5)
MCHC RBC AUTO-ENTMCNC: 32.9 G/DL (ref 31.5–36.5)
MCV RBC AUTO: 88 FL (ref 78–100)
MCV RBC AUTO: 89 FL (ref 78–100)
MCV RBC AUTO: 90 FL (ref 78–100)
METAMYELOCYTES # BLD MANUAL: 0.2 10E3/UL
METAMYELOCYTES # BLD MANUAL: 0.4 10E3/UL
METAMYELOCYTES # BLD MANUAL: 0.6 10E3/UL
METAMYELOCYTES # BLD MANUAL: 0.7 10E3/UL
METAMYELOCYTES NFR BLD MANUAL: 1 %
METAMYELOCYTES NFR BLD MANUAL: 2 %
METAMYELOCYTES NFR BLD MANUAL: 3 %
METAMYELOCYTES NFR BLD MANUAL: 4 %
MONOCYTES # BLD MANUAL: 0.9 10E3/UL (ref 0–1.3)
MONOCYTES # BLD MANUAL: 1 10E3/UL (ref 0–1.3)
MONOCYTES # BLD MANUAL: 1.5 10E3/UL (ref 0–1.3)
MONOCYTES # BLD MANUAL: 1.9 10E3/UL (ref 0–1.3)
MONOCYTES # BLD MANUAL: 2.7 10E3/UL (ref 0–1.3)
MONOCYTES NFR BLD MANUAL: 15 %
MONOCYTES NFR BLD MANUAL: 4 %
MONOCYTES NFR BLD MANUAL: 6 %
MONOCYTES NFR BLD MANUAL: 7 %
MONOCYTES NFR BLD MANUAL: 9 %
MYELOCYTES # BLD MANUAL: 0.2 10E3/UL
MYELOCYTES # BLD MANUAL: 0.2 10E3/UL
MYELOCYTES # BLD MANUAL: 0.4 10E3/UL
MYELOCYTES # BLD MANUAL: 1.2 10E3/UL
MYELOCYTES NFR BLD MANUAL: 1 %
MYELOCYTES NFR BLD MANUAL: 1 %
MYELOCYTES NFR BLD MANUAL: 2 %
MYELOCYTES NFR BLD MANUAL: 5 %
NEUTROPHILS # BLD MANUAL: 13.4 10E3/UL (ref 1.6–8.3)
NEUTROPHILS # BLD MANUAL: 13.6 10E3/UL (ref 1.6–8.3)
NEUTROPHILS # BLD MANUAL: 16.8 10E3/UL (ref 1.6–8.3)
NEUTROPHILS # BLD MANUAL: 19 10E3/UL (ref 1.6–8.3)
NEUTROPHILS # BLD MANUAL: 20.3 10E3/UL (ref 1.6–8.3)
NEUTROPHILS NFR BLD MANUAL: 75 %
NEUTROPHILS NFR BLD MANUAL: 78 %
NEUTROPHILS NFR BLD MANUAL: 81 %
NEUTROPHILS NFR BLD MANUAL: 86 %
NEUTROPHILS NFR BLD MANUAL: 89 %
NRBC # BLD AUTO: 0.1 10E3/UL
NRBC # BLD AUTO: 0.2 10E3/UL
NRBC # BLD AUTO: 0.5 10E3/UL
NRBC BLD AUTO-RTO: 1 /100
NRBC BLD MANUAL-RTO: 1 %
NRBC BLD MANUAL-RTO: 2 %
O2/TOTAL GAS SETTING VFR VENT: 100 %
O2/TOTAL GAS SETTING VFR VENT: 30 %
O2/TOTAL GAS SETTING VFR VENT: 30 %
O2/TOTAL GAS SETTING VFR VENT: 35 %
OXYHGB MFR BLD: 95 % (ref 92–100)
OXYHGB MFR BLD: 97 % (ref 92–100)
OXYHGB MFR BLD: 97 % (ref 92–100)
OXYHGB MFR BLD: 98 % (ref 92–100)
OXYHGB MFR BLD: 98 % (ref 92–100)
P AXIS - MUSE: NORMAL DEGREES
P AXIS - MUSE: NORMAL DEGREES
PATH REPORT.COMMENTS IMP SPEC: NORMAL
PATH REPORT.COMMENTS IMP SPEC: NORMAL
PATH REPORT.FINAL DX SPEC: NORMAL
PATH REPORT.GROSS SPEC: NORMAL
PATH REPORT.MICROSCOPIC SPEC OTHER STN: NORMAL
PATH REPORT.RELEVANT HX SPEC: NORMAL
PCO2 BLD: 30 MM HG (ref 35–45)
PCO2 BLD: 31 MM HG (ref 35–45)
PCO2 BLD: 33 MM HG (ref 35–45)
PCO2 BLD: 33 MM HG (ref 35–45)
PCO2 BLD: 34 MM HG (ref 35–45)
PCO2 BLD: 35 MM HG (ref 35–45)
PH BLD: 7.42 [PH] (ref 7.35–7.45)
PH BLD: 7.43 [PH] (ref 7.35–7.45)
PH BLD: 7.43 [PH] (ref 7.35–7.45)
PH BLD: 7.44 [PH] (ref 7.35–7.45)
PH BLD: 7.45 [PH] (ref 7.35–7.45)
PH BLD: 7.46 [PH] (ref 7.35–7.45)
PHOSPHATE SERPL-MCNC: 2.7 MG/DL (ref 2.5–4.5)
PHOTO IMAGE: NORMAL
PLAT MORPH BLD: ABNORMAL
PLATELET # BLD AUTO: 119 10E3/UL (ref 150–450)
PLATELET # BLD AUTO: 120 10E3/UL (ref 150–450)
PLATELET # BLD AUTO: 142 10E3/UL (ref 150–450)
PLATELET # BLD AUTO: 144 10E3/UL (ref 150–450)
PLATELET # BLD AUTO: 150 10E3/UL (ref 150–450)
PO2 BLD: 120 MM HG (ref 80–105)
PO2 BLD: 126 MM HG (ref 80–105)
PO2 BLD: 127 MM HG (ref 80–105)
PO2 BLD: 313 MM HG (ref 80–105)
PO2 BLD: 92 MM HG (ref 80–105)
PO2 BLD: 99 MM HG (ref 80–105)
POLYCHROMASIA BLD QL SMEAR: SLIGHT
POLYCHROMASIA BLD QL SMEAR: SLIGHT
POTASSIUM SERPL-SCNC: 3.9 MMOL/L (ref 3.4–5.3)
POTASSIUM SERPL-SCNC: 4 MMOL/L (ref 3.4–5.3)
POTASSIUM SERPL-SCNC: 4.1 MMOL/L (ref 3.4–5.3)
POTASSIUM SERPL-SCNC: 4.2 MMOL/L (ref 3.4–5.3)
POTASSIUM SERPL-SCNC: 4.4 MMOL/L (ref 3.4–5.3)
PR INTERVAL - MUSE: NORMAL MS
PR INTERVAL - MUSE: NORMAL MS
PROMYELOCYTES # BLD MANUAL: 0.2 10E3/UL
PROMYELOCYTES NFR BLD MANUAL: 1 %
QRS DURATION - MUSE: 120 MS
QRS DURATION - MUSE: 180 MS
QT - MUSE: 412 MS
QT - MUSE: 464 MS
QTC - MUSE: 624 MS
QTC - MUSE: 696 MS
R AXIS - MUSE: -63 DEGREES
R AXIS - MUSE: 179 DEGREES
RBC # BLD AUTO: 2.3 10E6/UL (ref 4.4–5.9)
RBC # BLD AUTO: 2.51 10E6/UL (ref 4.4–5.9)
RBC # BLD AUTO: 2.56 10E6/UL (ref 4.4–5.9)
RBC # BLD AUTO: 2.65 10E6/UL (ref 4.4–5.9)
RBC # BLD AUTO: 2.75 10E6/UL (ref 4.4–5.9)
RBC MORPH BLD: ABNORMAL
SODIUM SERPL-SCNC: 133 MMOL/L (ref 136–145)
SODIUM SERPL-SCNC: 135 MMOL/L (ref 136–145)
SODIUM SERPL-SCNC: 137 MMOL/L (ref 136–145)
SYSTOLIC BLOOD PRESSURE - MUSE: NORMAL MMHG
SYSTOLIC BLOOD PRESSURE - MUSE: NORMAL MMHG
T AXIS - MUSE: 176 DEGREES
T AXIS - MUSE: 60 DEGREES
UFH PPP CHRO-ACNC: 0.25 IU/ML
UFH PPP CHRO-ACNC: 0.31 IU/ML
UFH PPP CHRO-ACNC: <0.1 IU/ML
UFH PPP CHRO-ACNC: <0.1 IU/ML
UNIT ABO/RH: NORMAL
UNIT ABO/RH: NORMAL
UNIT NUMBER: NORMAL
UNIT NUMBER: NORMAL
UNIT STATUS: NORMAL
UNIT STATUS: NORMAL
UNIT TYPE ISBT: 5100
UNIT TYPE ISBT: 5100
VENTRICULAR RATE- MUSE: 135 BPM
VENTRICULAR RATE- MUSE: 138 BPM
WBC # BLD AUTO: 16.8 10E3/UL (ref 4–11)
WBC # BLD AUTO: 17.8 10E3/UL (ref 4–11)
WBC # BLD AUTO: 21.3 10E3/UL (ref 4–11)
WBC # BLD AUTO: 21.5 10E3/UL (ref 4–11)
WBC # BLD AUTO: 23.6 10E3/UL (ref 4–11)

## 2022-07-15 PROCEDURE — 87040 BLOOD CULTURE FOR BACTERIA: CPT | Performed by: SURGERY

## 2022-07-15 PROCEDURE — 250N000013 HC RX MED GY IP 250 OP 250 PS 637: Performed by: SURGERY

## 2022-07-15 PROCEDURE — 250N000013 HC RX MED GY IP 250 OP 250 PS 637: Performed by: STUDENT IN AN ORGANIZED HEALTH CARE EDUCATION/TRAINING PROGRAM

## 2022-07-15 PROCEDURE — 999N000015 HC STATISTIC ARTERIAL MONITORING DAILY

## 2022-07-15 PROCEDURE — 84460 ALANINE AMINO (ALT) (SGPT): CPT | Performed by: SURGERY

## 2022-07-15 PROCEDURE — 71045 X-RAY EXAM CHEST 1 VIEW: CPT | Mod: 26 | Performed by: RADIOLOGY

## 2022-07-15 PROCEDURE — 83735 ASSAY OF MAGNESIUM: CPT | Performed by: THORACIC SURGERY (CARDIOTHORACIC VASCULAR SURGERY)

## 2022-07-15 PROCEDURE — 85520 HEPARIN ASSAY: CPT | Performed by: SURGERY

## 2022-07-15 PROCEDURE — 82803 BLOOD GASES ANY COMBINATION: CPT | Performed by: STUDENT IN AN ORGANIZED HEALTH CARE EDUCATION/TRAINING PROGRAM

## 2022-07-15 PROCEDURE — 250N000011 HC RX IP 250 OP 636: Performed by: STUDENT IN AN ORGANIZED HEALTH CARE EDUCATION/TRAINING PROGRAM

## 2022-07-15 PROCEDURE — 80069 RENAL FUNCTION PANEL: CPT | Performed by: SURGERY

## 2022-07-15 PROCEDURE — 250N000011 HC RX IP 250 OP 636: Performed by: SURGERY

## 2022-07-15 PROCEDURE — P9016 RBC LEUKOCYTES REDUCED: HCPCS | Performed by: STUDENT IN AN ORGANIZED HEALTH CARE EDUCATION/TRAINING PROGRAM

## 2022-07-15 PROCEDURE — 85610 PROTHROMBIN TIME: CPT | Performed by: SURGERY

## 2022-07-15 PROCEDURE — 85300 ANTITHROMBIN III ACTIVITY: CPT | Performed by: SURGERY

## 2022-07-15 PROCEDURE — 71045 X-RAY EXAM CHEST 1 VIEW: CPT

## 2022-07-15 PROCEDURE — 85384 FIBRINOGEN ACTIVITY: CPT | Performed by: SURGERY

## 2022-07-15 PROCEDURE — 36415 COLL VENOUS BLD VENIPUNCTURE: CPT | Performed by: SURGERY

## 2022-07-15 PROCEDURE — 83051 HEMOGLOBIN PLASMA: CPT | Performed by: SURGERY

## 2022-07-15 PROCEDURE — 71045 X-RAY EXAM CHEST 1 VIEW: CPT | Mod: 77

## 2022-07-15 PROCEDURE — 85520 HEPARIN ASSAY: CPT | Performed by: THORACIC SURGERY (CARDIOTHORACIC VASCULAR SURGERY)

## 2022-07-15 PROCEDURE — 83605 ASSAY OF LACTIC ACID: CPT | Performed by: SURGERY

## 2022-07-15 PROCEDURE — 99291 CRITICAL CARE FIRST HOUR: CPT | Mod: 24 | Performed by: ANESTHESIOLOGY

## 2022-07-15 PROCEDURE — 85730 THROMBOPLASTIN TIME PARTIAL: CPT | Performed by: SURGERY

## 2022-07-15 PROCEDURE — 82805 BLOOD GASES W/O2 SATURATION: CPT | Performed by: SURGERY

## 2022-07-15 PROCEDURE — 85379 FIBRIN DEGRADATION QUANT: CPT | Performed by: SURGERY

## 2022-07-15 PROCEDURE — 85027 COMPLETE CBC AUTOMATED: CPT | Performed by: SURGERY

## 2022-07-15 PROCEDURE — 93283 PRGRMG EVAL IMPLANTABLE DFB: CPT | Mod: 26 | Performed by: INTERNAL MEDICINE

## 2022-07-15 PROCEDURE — 250N000009 HC RX 250: Performed by: STUDENT IN AN ORGANIZED HEALTH CARE EDUCATION/TRAINING PROGRAM

## 2022-07-15 PROCEDURE — 93283 PRGRMG EVAL IMPLANTABLE DFB: CPT

## 2022-07-15 PROCEDURE — 258N000003 HC RX IP 258 OP 636: Performed by: STUDENT IN AN ORGANIZED HEALTH CARE EDUCATION/TRAINING PROGRAM

## 2022-07-15 PROCEDURE — 200N000002 HC R&B ICU UMMC

## 2022-07-15 PROCEDURE — 250N000011 HC RX IP 250 OP 636: Performed by: THORACIC SURGERY (CARDIOTHORACIC VASCULAR SURGERY)

## 2022-07-15 PROCEDURE — 85007 BL SMEAR W/DIFF WBC COUNT: CPT | Performed by: SURGERY

## 2022-07-15 PROCEDURE — 82330 ASSAY OF CALCIUM: CPT | Performed by: SURGERY

## 2022-07-15 PROCEDURE — 83735 ASSAY OF MAGNESIUM: CPT | Performed by: SURGERY

## 2022-07-15 PROCEDURE — 999N000157 HC STATISTIC RCP TIME EA 10 MIN

## 2022-07-15 PROCEDURE — 99233 SBSQ HOSP IP/OBS HIGH 50: CPT | Mod: 25 | Performed by: INTERNAL MEDICINE

## 2022-07-15 PROCEDURE — 94003 VENT MGMT INPAT SUBQ DAY: CPT

## 2022-07-15 PROCEDURE — P9016 RBC LEUKOCYTES REDUCED: HCPCS | Performed by: SURGERY

## 2022-07-15 PROCEDURE — C9113 INJ PANTOPRAZOLE SODIUM, VIA: HCPCS | Performed by: SURGERY

## 2022-07-15 PROCEDURE — 250N000011 HC RX IP 250 OP 636

## 2022-07-15 PROCEDURE — 250N000012 HC RX MED GY IP 250 OP 636 PS 637: Performed by: STUDENT IN AN ORGANIZED HEALTH CARE EDUCATION/TRAINING PROGRAM

## 2022-07-15 PROCEDURE — 82310 ASSAY OF CALCIUM: CPT | Performed by: SURGERY

## 2022-07-15 RX ORDER — CALCIUM GLUCONATE 20 MG/ML
2 INJECTION, SOLUTION INTRAVENOUS
Status: ACTIVE | OUTPATIENT
Start: 2022-07-15 | End: 2022-07-18

## 2022-07-15 RX ORDER — HEPARIN SODIUM 10000 [USP'U]/100ML
0-5000 INJECTION, SOLUTION INTRAVENOUS CONTINUOUS
Status: DISCONTINUED | OUTPATIENT
Start: 2022-07-15 | End: 2022-07-18

## 2022-07-15 RX ORDER — FUROSEMIDE 10 MG/ML
60 INJECTION INTRAMUSCULAR; INTRAVENOUS ONCE
Status: COMPLETED | OUTPATIENT
Start: 2022-07-15 | End: 2022-07-15

## 2022-07-15 RX ORDER — QUETIAPINE FUMARATE 25 MG/1
25 TABLET, FILM COATED ORAL 2 TIMES DAILY
Status: DISCONTINUED | OUTPATIENT
Start: 2022-07-15 | End: 2022-07-17

## 2022-07-15 RX ORDER — QUETIAPINE FUMARATE 50 MG/1
50 TABLET, FILM COATED ORAL AT BEDTIME
Status: DISCONTINUED | OUTPATIENT
Start: 2022-07-15 | End: 2022-07-17

## 2022-07-15 RX ORDER — PIPERACILLIN SODIUM, TAZOBACTAM SODIUM 4; .5 G/20ML; G/20ML
4.5 INJECTION, POWDER, LYOPHILIZED, FOR SOLUTION INTRAVENOUS EVERY 6 HOURS
Status: COMPLETED | OUTPATIENT
Start: 2022-07-15 | End: 2022-07-22

## 2022-07-15 RX ORDER — MUPIROCIN 20 MG/G
1 OINTMENT TOPICAL 2 TIMES DAILY
Status: CANCELLED | OUTPATIENT
Start: 2022-07-15 | End: 2022-07-19

## 2022-07-15 RX ORDER — CALCIUM GLUCONATE 20 MG/ML
1 INJECTION, SOLUTION INTRAVENOUS
Status: DISPENSED | OUTPATIENT
Start: 2022-07-15 | End: 2022-07-18

## 2022-07-15 RX ADMIN — INSULIN GLARGINE 35 UNITS: 100 INJECTION, SOLUTION SUBCUTANEOUS at 11:27

## 2022-07-15 RX ADMIN — HUMAN INSULIN 2 UNITS/HR: 100 INJECTION, SOLUTION SUBCUTANEOUS at 00:25

## 2022-07-15 RX ADMIN — PANTOPRAZOLE SODIUM 40 MG: 40 INJECTION, POWDER, FOR SOLUTION INTRAVENOUS at 08:31

## 2022-07-15 RX ADMIN — DEXMEDETOMIDINE HYDROCHLORIDE 1.2 MCG/KG/HR: 4 INJECTION, SOLUTION INTRAVENOUS at 22:17

## 2022-07-15 RX ADMIN — HYDRALAZINE HYDROCHLORIDE 10 MG: 10 TABLET, FILM COATED ORAL at 15:44

## 2022-07-15 RX ADMIN — CALCIUM GLUCONATE 3 G: 98 INJECTION, SOLUTION INTRAVENOUS at 19:29

## 2022-07-15 RX ADMIN — PROPOFOL 30 MCG/KG/MIN: 10 INJECTION, EMULSION INTRAVENOUS at 06:54

## 2022-07-15 RX ADMIN — SENNOSIDES AND DOCUSATE SODIUM 1 TABLET: 8.6; 5 TABLET ORAL at 08:31

## 2022-07-15 RX ADMIN — DEXMEDETOMIDINE HYDROCHLORIDE 1.2 MCG/KG/HR: 4 INJECTION, SOLUTION INTRAVENOUS at 16:53

## 2022-07-15 RX ADMIN — HYDRALAZINE HYDROCHLORIDE 10 MG: 20 INJECTION INTRAMUSCULAR; INTRAVENOUS at 06:24

## 2022-07-15 RX ADMIN — HEPARIN SODIUM AND DEXTROSE 1250 UNITS/HR: 10000; 5 INJECTION INTRAVENOUS at 23:09

## 2022-07-15 RX ADMIN — Medication 1 PACKET: at 20:22

## 2022-07-15 RX ADMIN — ACETAMINOPHEN 975 MG: 325 TABLET, FILM COATED ORAL at 21:12

## 2022-07-15 RX ADMIN — HYDRALAZINE HYDROCHLORIDE 10 MG: 10 TABLET, FILM COATED ORAL at 05:17

## 2022-07-15 RX ADMIN — PIPERACILLIN AND TAZOBACTAM 4.5 G: 4; .5 INJECTION, POWDER, LYOPHILIZED, FOR SOLUTION INTRAVENOUS at 23:12

## 2022-07-15 RX ADMIN — ASPIRIN 81 MG CHEWABLE TABLET 81 MG: 81 TABLET CHEWABLE at 08:31

## 2022-07-15 RX ADMIN — HYDRALAZINE HYDROCHLORIDE 10 MG: 10 TABLET, FILM COATED ORAL at 21:13

## 2022-07-15 RX ADMIN — QUETIAPINE FUMARATE 50 MG: 50 TABLET ORAL at 21:12

## 2022-07-15 RX ADMIN — ACETAMINOPHEN 975 MG: 325 TABLET, FILM COATED ORAL at 05:17

## 2022-07-15 RX ADMIN — PROPOFOL 45 MCG/KG/MIN: 10 INJECTION, EMULSION INTRAVENOUS at 00:37

## 2022-07-15 RX ADMIN — Medication 1 PACKET: at 08:31

## 2022-07-15 RX ADMIN — Medication 100 MCG/HR: at 21:06

## 2022-07-15 RX ADMIN — QUETIAPINE FUMARATE 25 MG: 25 TABLET ORAL at 20:21

## 2022-07-15 RX ADMIN — DULOXETINE 30 MG: 30 CAPSULE, DELAYED RELEASE ORAL at 08:31

## 2022-07-15 RX ADMIN — FUROSEMIDE 60 MG: 10 INJECTION, SOLUTION INTRAVENOUS at 11:39

## 2022-07-15 RX ADMIN — PIPERACILLIN AND TAZOBACTAM 4.5 G: 4; .5 INJECTION, POWDER, LYOPHILIZED, FOR SOLUTION INTRAVENOUS at 11:39

## 2022-07-15 RX ADMIN — DEXMEDETOMIDINE HYDROCHLORIDE 0.4 MCG/KG/HR: 4 INJECTION, SOLUTION INTRAVENOUS at 12:11

## 2022-07-15 RX ADMIN — PIPERACILLIN AND TAZOBACTAM 4.5 G: 4; .5 INJECTION, POWDER, LYOPHILIZED, FOR SOLUTION INTRAVENOUS at 17:38

## 2022-07-15 RX ADMIN — HYDRALAZINE HYDROCHLORIDE 20 MG: 20 INJECTION INTRAMUSCULAR; INTRAVENOUS at 12:07

## 2022-07-15 RX ADMIN — Medication 1 PACKET: at 15:45

## 2022-07-15 RX ADMIN — INSULIN ASPART 3 UNITS: 100 INJECTION, SOLUTION INTRAVENOUS; SUBCUTANEOUS at 17:13

## 2022-07-15 RX ADMIN — ACETAMINOPHEN 975 MG: 325 TABLET, FILM COATED ORAL at 15:44

## 2022-07-15 RX ADMIN — MULTIVITAMIN 15 ML: LIQUID ORAL at 08:31

## 2022-07-15 ASSESSMENT — ACTIVITIES OF DAILY LIVING (ADL)
ADLS_ACUITY_SCORE: 38

## 2022-07-15 NOTE — PROGRESS NOTES
[facility]    Cardiology Progress Note- Cards 2        Date of Admission:  6/17/2022     Assessment & Plan: HVSL   Dandy EDUARD Sands is a 61 year old male with PMH of lupus, antiphospholipid syndrome, HTN, HLD, HFrEF 2/2 ICM who presented with cardiogenic shock c/b multiorgan failure (JOSE, shock liver) requiring mechanical support with IABP, now s/p HM3 LVAD 7/8/22 by Dr. Zamora with intraoperative course c/b RV failure s/p 29F Protek duo via RIJ. Post op course c/b hemopericardium s/p repeat washout with chest left open. Chest now closed 7/13/22     #HFrEF 2/2 ICM s/p HM3 LVAD in setting of cardiogenic shock (SCAI D)  #RV failure s/p Protek duo temporary RVAD  #Post chest closure hemopericardium s/p repeat washout  Patient with ICM s/p HM3 LVAD 7/8/22 by Dr. Zamora in setting of presentation for cardiogenic shock requiring MCS with IABP as prior to HM (initially evaluated for heart transplant, however noted to have substantially elevated DSAs during course of care, so decision made to proceed with LVAD given patient already on temporary MCS). Intraoperative course c/b RV failure resulting in cardiogenic shock requiring high dose pressors necessitating RVAD support. Chest closed 7/11 and Ashli weaned. However developed tachycardia, lactic acidosis and decreased hemoglobin with CT scan noting hemopericardium. Returned to OR where underwent washout with large clot burden noted over the right atrium and around LVAD outflow graft. Chest left open and returned to ICU where pressors were able to be weaned. Noted to have stable RVAD/LVAD flows throughout and post procedurally. Ultimately returned to OR for repeat closure. Currently hemodynamically stable with end-organ function and hemoglobin. Attempting to wean sedation with goal for pressure support and possible extubation.   -LVAD: continues to have good flows with no alarms and stable end organ function off pressors;  will continue at current speed  -RVAD: no evidence of increasing hemolysis, flows appear balanced, oxygenating well; recommend continuing at current flow; if able to be extubated today, will assess for wean  -AC, antiplatelets: per surgical service; has had some post-operative bleeding and prior hemopericardium; will need to monitor coags closely in setting of heparin use  -Volume status: appears more hypervolemic today; will dose lasix x1 with goal net even to slightly net negative  -rhythm: device interrogation done and noted to be sinus tachycardia with no arrhythmias reported. Bal top amiodarone  -agree with plan for vent wean with PST with hopeful extubation when able; once extubated will then assess for RVAD wean  -continue oral hydralazine for MAP control with hold parameters for MAP<80      The patient's care was discussed with the Attending Physician, Dr. Askew, Bedside Nurse and Patient.    Emelyn Meneses MD  [facility]    ______________________________________________________________________    Interval History   Nursing notes reviewed. BRAXTON. Getting 1U PRBC this AM. No issues with RVAD/LVAD flows.     Data reviewed today: I reviewed all medications, new labs and imaging results over the last 24 hours. I personally reviewed the chest x-ray image(s) showing stable lung appearance     Physical Exam   Vital Signs: Temp: 99.5  F (37.5  C) Temp src: Esophageal  Pulse: 112   Resp: 18 SpO2: 92 % O2 Device: Mechanical Ventilator    Weight: 176 lbs 2.36 oz  General Appearance: Intubated, sedated male in ICU bed  Respiratory: ventilated lung sounds, clear anterior breath sounds  Cardiovascular: vad hum, driveline site c/d/i, protek in place, chest incisions c/d/i  GI: soft, bs active  Skin: warm, well perfused, 1+ peripheral edema  Neuro: sedated, intubated, pupils equal, moves all 4 with sedation wean    Data   Recent Labs   Lab 07/15/22  0559 07/15/22  0352  07/15/22  0345 07/14/22  2154 07/14/22  2149 07/14/22 2000 07/14/22  1950 07/14/22  1530 07/14/22  1528 07/14/22  0351 07/14/22  0337 07/08/22 2225 07/08/22  2213 07/08/22  1644 07/08/22  1643   WBC  --   --  17.8*  --  17.6*  --  16.6*  --  19.6*   < > 13.8*   < > 7.8   < > 14.1*   HGB  --   --  7.7*  --  7.3*  --  7.3*  --  7.6*   < > 9.2*   < > 8.7*   < > 7.2*   MCV  --   --  89  --  88  --  88  --  87   < > 89   < > 89   < > 89   PLT  --   --  120*  --  130*  --  110*  --  119*   < > 93*   < > 114*   < > 163   INR  --   --  1.23*  --  1.24*  --   --   --  1.20*   < > 1.21*   < > 1.45*   < > 1.87*   NA  --   --  135*  --  133*  --  135*  --  137   < > 136   < > 137  --  141   POTASSIUM  --   --  4.0  --  4.1  --  4.2  --  4.3   < > 4.3   < > 4.4  --  3.9   CHLORIDE  --   --  107  --  104  --  106  --  108*   < > 106   < > 106  --  107   CO2  --   --  19*  --  17*  --  19*  --  19*   < > 20*   < > 22  --  20*   BUN  --   --  26.3*  --  27.6*  --  28.6*  --  29.2*   < > 31.6*   < > 16.7  --  14.0   CR  --   --  0.58*  --  0.57*  --  0.58*  --  0.59*   < > 0.62*   < > 0.78  --  0.73   ANIONGAP  --   --  9  --  12  --  10  --  10   < > 10   < > 9  --  14   ELDA  --   --  7.9*  --  7.8*  --  8.0*  --  8.2*   < > 7.7*   < > 8.3*  --  8.2*   * 130* 119*   < > 150*   < > 145*   < > 153*   < > 152*   < > 101*   < > 108*   ALBUMIN  --   --  2.3*  --   --   --   --   --   --   --  2.3*   < > 2.5*  --  2.3*   PROTTOTAL  --   --   --   --   --   --   --   --   --   --   --   --  4.4*  --  3.8*   BILITOTAL  --   --   --   --   --   --   --   --   --   --   --   --  1.0  --  1.0   ALKPHOS  --   --   --   --   --   --   --   --   --   --   --   --  59  --  51   ALT  --   --  20  --   --   --   --   --   --   --  23   < > 29  --  25   AST  --   --   --   --   --   --   --   --   --   --   --   --  159*  --  134*    < > = values in this interval not displayed.       Medications     amiodarone 0.5 mg/min (07/15/22 0700)      dexmedetomidine Stopped (07/14/22 1300)     dextrose       dextrose       fentaNYL 100 mcg/hr (07/15/22 0600)     heparin 400 Units/hr (07/15/22 0600)     insulin regular 3 Units/hr (07/15/22 0600)     propofol (DIPRIVAN) infusion 30 mcg/kg/min (07/15/22 0600)       acetaminophen  975 mg Oral or Feeding Tube Q8H CAMMY     aspirin  81 mg Oral or Feeding Tube Daily     DULoxetine  30 mg Oral Daily     fluticasone-salmeterol  2 puff Inhalation BID     hydrALAZINE  10 mg Oral or Feeding Tube Q8H [facility]     multivitamins w/minerals  15 mL Per Feeding Tube Daily     pantoprazole (PROTONIX) IV  40 mg Intravenous BID     polyethylene glycol  17 g Oral or Feeding Tube Daily     protein modular  1 packet Per Feeding Tube TID     senna-docusate  1 tablet Oral or Feeding Tube BID     sodium chloride (PF)  3 mL Intracatheter Q8H     sodium chloride (PF)  3 mL Intracatheter Q8H

## 2022-07-15 NOTE — CONSULTS
Dental Service Consultation      Dandy Sands MRN# [id number]  YOB: 1960 Age: 61 year old  Date of Admission: 6/17/2022    Reason for consult: I was asked to evaluate this patient for an oral exam for a fractured tooth.    Chief Complaint: Crown with post on front tooth broken (#9)     Assessment and Plan:   Assessment: Crown with post from tooth #9 decimented. Possible cavities on tooth #8.      Extraoral exam: WNR  Intraoral exam:  limited exam as patient is intubated. Possible caries on tooth #8. Crown with post decimented on tooth #9.      Plan:  - Monitor as patient intubated and oral exam is limited.  - Schedule CT Scan if patient is extubated and oral exam possible.    - Patient will be seen at the [facility] once the patient is stable enough to transfer. If the patient is not stable enough to transfer coordination with our scheduling team may be necessary to see patient in the operating room for tooth extractions.   - Physician of the patient has been notified of the procedure on how to make an appointment for the patient (CALL : [phone] and make the arrangements for moving the patient to the clinic or to coordinate operating room scheduling).     Clinic information:   [facility]  [address] [place] [id number]  [phone]      Unable to obtain a history from the patient due to critical condition    Past Medical History:  No past medical history on file.    Past Surgical History:  Past Surgical History:   Procedure Laterality Date     COLONOSCOPY N/A 05/23/2022    Procedure: COLONOSCOPY;  Surgeon: Parth Meneses MD;  Location: [facility] OR     CV CORONARY ANGIOGRAM N/A 5/24/2022    Procedure: Coronary Angiogram;  Surgeon: Mike Pope MD;  Location:  HEART CARDIAC CATH LAB     CV CORONARY ANGIOGRAM N/A 5/29/2022    Procedure: Coronary Angiogram;  Surgeon: Austin Bright MD;  Location:  HEART CARDIAC CATH LAB     CV  CORONARY LITHOTRIPSY PCI Bilateral 5/24/2022    Procedure: Percutaneous Coronary Intervention - Lithotripsy;  Surgeon: Mike Pope MD;  Location: [facility]     CV INTRA AORTIC BALLOON N/A 6/17/2022    Procedure: Intraprocedure Aortic Balloon Pump Insertion;  Surgeon: Sherman Cerda MD;  [facility]     CV INTRA AORTIC BALLOON Right 7/3/2022    Procedure: Reposition of Subclavian Intraprocedure Aortic Balloon Pump;  Surgeon: Austin Bright MD;  [facility]     CV INTRAVASULAR ULTRASOUND N/A 5/24/2022    Procedure: Intravascular Ultrasound;  Surgeon: Mike Pope MD;  [facility]     CV PCI ANGIOPLASTY N/A 5/29/2022    Procedure: Percutaneous Transluminal Angioplasty;  Surgeon: Austin Bright MD;  [facility]: [facility]     CV PCI STENT DRUG ELUTING N/A 5/24/2022    Procedure: Percutaneous Coronary Intervention Stent;  Surgeon: Mike Pope MD;  Location: [facility]     CV RIGHT HEART CATH MEASUREMENTS RECORDED N/A 05/18/2022    Procedure: Right Heart Catheterization;  Surgeon: Justo Stewart MD;  [facility]     CV RIGHT HEART CATH MEASUREMENTS RECORDED N/A 5/24/2022    Procedure: Right Heart Catheterization;  Surgeon: Mike Pope MD;  [facility]     CV RIGHT HEART CATH MEASUREMENTS RECORDED N/A 5/29/2022    Procedure: Right Heart Catheterization;  Surgeon: Austin Bright MD;  [facility]     CV RIGHT HEART CATH MEASUREMENTS RECORDED N/A 7/1/2022    Procedure: Right Heart Catheterization;  Surgeon: Mike Pope MD;  [facility]     CV RIGHT HEART EXERCISE STRESS STUDY N/A 05/18/2022    Procedure: Stress Drug Study;  Surgeon: Justo Stewart MD;  [facility]     CV SWAN CHOCO PROCEDURE N/A 6/17/2022    Procedure: [place] Procedure;   Surgeon: Sherman Cerda MD;  [facility]     INCISION AND DRAINAGE CHEST WASHOUT, COMBINED N/A 7/11/2022    Procedure: Chest washout and closure;  Surgeon: Darnell Morgan MD;  [facility]     INCISION AND DRAINAGE CHEST WASHOUT, COMBINED N/A 7/12/2022    Procedure: INCISION AND DRAINAGE, WOUND, CHEST, WITH IRRIGATION;  Surgeon: Darius Zamora MD;  [facility]: [facility]     INSERT INTRAAORTIC BALLOON PUMP Right 6/23/2022    Procedure: INSERTION, INTRA-AORTIC BALLOON PUMP RIGHT SUBCLAVIAN ARTERY.;  Surgeon: Hayden Veras MD;  [facility]     INSERT VENTRICULAR ASSIST DEVICE LEFT (HEARTMATE II/III) MINIMALLY INVASIVE N/A 7/8/2022    Procedure: MEDIAN STERNOTOMY.  CARDIOPULMONARY BYPASS.  INTRAOPERATIVE TRANSESOPHAGEAL ECHOCARDIOGRAM.  IMPLANT LEFT VENTRICULAR ASSIST DEVICE HEARTMATE III.  PLACEMENT OF PERCUTANEOUS RIGHT VENTRICULAR ASSIST DEVICE.  TEMPORARY CHEST CLOSURE;  Surgeon: Darius Zamora MD;  Location: [facility]     IRRIGATION AND DEBRIDEMENT CHEST WASHOUT, COMBINED N/A 7/13/2022    Procedure: IRRIGATION AND DEBRIDEMENT, CHEST;  Surgeon: Darius Zamora MD;  Location: [facility]     PICC DOUBLE LUMEN PLACEMENT Right 05/28/2022    right basilic 5 fr dl picc 40 cm       Social History:  Social History     Tobacco Use     Smoking status: Not on file     Smokeless tobacco: Not on file   Substance Use Topics     Alcohol use: Not on file       Family History:  No family history on file.    Immunizations:  Immunization History   Administered Date(s) Administered     COVID-19,PF,Moderna 03/13/2021, 04/10/2021, 08/24/2021     Influenza Vaccine IM > 6 months Valent IIV4 (Alfuria,Fluzone) 12/17/2021     Pneumo Conj 13-V (2010&after) 04/09/2018     Tdap (Adult) Unspecified Formulation 03/14/2016     Zoster vaccine recombinant adjuvanted (SHINGRIX) 08/16/2019, 10/18/2019       Allergies:  No Known Allergies    Medications:  Current Facility-Administered Medications  Ordered in Epic   Medication Dose Route Frequency Last Rate Last Admin     acetaminophen (TYLENOL) tablet 650 mg  650 mg Oral Q4H PRN         acetaminophen (TYLENOL) tablet 975 mg  975 mg Oral or Feeding Tube Q8H CAMMY   975 mg at 07/15/22 1544     albumin human 5 % injection 250 mL  250 mL Intravenous Q1H PRN   250 mL at 07/09/22 2045     artificial tears ophthalmic ointment   Both Eyes Q4H PRN   Given at 07/14/22 0125     aspirin (ASA) chewable tablet 81 mg  81 mg Oral or Feeding Tube Daily   81 mg at 07/15/22 0831     bisacodyl (DULCOLAX) suppository 10 mg  10 mg Rectal Daily PRN         dexmedetomidine (PRECEDEX) 400 mcg in 0.9% sodium chloride 100 mL  0.1-1.2 mcg/kg/hr (Dosing Weight) Intravenous Continuous 16.9 mL/hr at 07/15/22 1217 1 mcg/kg/hr at 07/15/22 1217     dextrose 10% infusion   Intravenous Continuous PRN         dextrose 10% infusion   Intravenous Continuous PRN         glucose gel 15-30 g  15-30 g Oral Q15 Min PRN        Or     dextrose 50 % injection 25-50 mL  25-50 mL Intravenous Q15 Min PRN        Or     glucagon injection 1 mg  1 mg Subcutaneous Q15 Min PRN         DULoxetine (CYMBALTA) DR capsule 30 mg  30 mg Oral Daily   30 mg at 07/15/22 0831     fentaNYL (SUBLIMAZE) infusion   mcg/hr Intravenous Continuous 2 mL/hr at 07/15/22 1217 100 mcg/hr at 07/15/22 1217     fluticasone-salmeterol (ADVAIR HFA) 115-21 MCG/ACT Inhaler 2 puff  2 puff Inhalation BID   2 puff at 07/14/22 1953     heparin bolus from infusion pump   Intravenous Q6H PRN         heparin infusion 25,000 units in D5W 250 mL ANTICOAGULANT  0-5,000 Units/hr Intravenous Continuous 12.5 mL/hr at 07/15/22 1148 1,250 Units/hr at 07/15/22 1148     HOLD MEDICATION   Does not apply HOLD         hydrALAZINE (APRESOLINE) injection 10-20 mg  10-20 mg Intravenous Q4H PRN   20 mg at 07/15/22 1207     hydrALAZINE (APRESOLINE) tablet 10 mg  10 mg Oral or Feeding Tube Q8H CAMMY   10 mg at 07/15/22 1544     HYDROmorphone (DILAUDID) injection 0.2  mg  0.2 mg Intravenous Q2H PRN        Or     HYDROmorphone (DILAUDID) injection 0.4 mg  0.4 mg Intravenous Q2H PRN         insulin aspart (NovoLOG) injection (RAPID ACTING)  1-12 Units Subcutaneous Q4H         insulin glargine (LANTUS PEN) injection 35 Units  35 Units Subcutaneous QAM AC   35 Units at 07/15/22 1127     lactated ringers BOLUS 250 mL  250 mL Intravenous Q10 Min PRN         lidocaine (LMX4) cream   Topical Q1H PRN         lidocaine 1 % 0.1-1 mL  0.1-1 mL Other Q1H PRN         magnesium hydroxide (MILK OF MAGNESIA) suspension 30 mL  30 mL Oral Daily PRN         multivitamins w/minerals liquid 15 mL  15 mL Per Feeding Tube Daily   15 mL at 07/15/22 0831     naloxone (NARCAN) injection 0.2 mg  0.2 mg Intravenous Q2 Min PRN        Or     naloxone (NARCAN) injection 0.4 mg  0.4 mg Intravenous Q2 Min PRN        Or     naloxone (NARCAN) injection 0.2 mg  0.2 mg Intramuscular Q2 Min PRN        Or     naloxone (NARCAN) injection 0.4 mg  0.4 mg Intramuscular Q2 Min PRN         ondansetron (ZOFRAN ODT) ODT tab 4 mg  4 mg Oral Q6H PRN        Or     ondansetron (ZOFRAN) injection 4 mg  4 mg Intravenous Q6H PRN         oxyCODONE (ROXICODONE) tablet 5 mg  5 mg Oral Q4H PRN        Or     oxyCODONE IR (ROXICODONE) tablet 10 mg  10 mg Oral Q4H PRN         [START ON 7/16/2022] pantoprazole (PROTONIX) 2 mg/mL suspension 40 mg  40 mg Per Feeding Tube QAM AC         piperacillin-tazobactam (ZOSYN) 4.5 g vial to attach to  mL bag  4.5 g Intravenous Q6H   4.5 g at 07/15/22 1139     polyethylene glycol (MIRALAX) Packet 17 g  17 g Oral or Feeding Tube Daily   17 g at 07/11/22 0723     prochlorperazine (COMPAZINE) injection 10 mg  10 mg Intravenous Q6H PRN        Or     prochlorperazine (COMPAZINE) tablet 10 mg  10 mg Oral Q6H PRN         propofol (DIPRIVAN) infusion  5-75 mcg/kg/min (Dosing Weight) Intravenous Continuous   Stopped at 07/15/22 1000     protein modular (PROSOURCE TF) 1 packet  1 packet Per Feeding Tube TID    1 packet at 07/15/22 1545     QUEtiapine (SEROquel) tablet 25 mg  25 mg Oral or Feeding Tube BID         QUEtiapine (SEROquel) tablet 50 mg  50 mg Oral or Feeding Tube At Bedtime         senna-docusate (SENOKOT-S/PERICOLACE) 8.6-50 MG per tablet 1 tablet  1 tablet Oral or Feeding Tube BID   1 tablet at 07/15/22 0831     sodium chloride (PF) 0.9% PF flush 3 mL  3 mL Intracatheter Q8H   3 mL at 07/15/22 1131     sodium chloride (PF) 0.9% PF flush 3 mL  3 mL Intracatheter Q8H   3 mL at 07/15/22 1130     sodium chloride (PF) 0.9% PF flush 3 mL  3 mL Intracatheter q1 min prn         No current [facility]-ordered outpatient medications on file.       Review of Systems:  The 10 point Review of Systems is negative other than noted in the HPI    Physical Exam:  Vitals were reviewed  Temp: 98.8  F (37.1  C) Temp src: Esophageal BP: 123/89 Pulse: (!) 123   Resp: 30 SpO2: 99 % O2 Device: Mechanical Ventilator      Data:  Caries: Noted on tooth #8  Other: Tooth fracture (#9)    PGY1  Pager: [phone]

## 2022-07-15 NOTE — PROGRESS NOTES
Pt remains sedated and intubated on 1.2 dex and 100 mcgs fentanyl.  Heparin gtt following Xa.  Pt pressure supported this morning.  Sedation holiday.  Pt does not follow any commands and restless.  With draws to pain.  Bowl movement X1 this shift.  60 lasix given with good diurisis.  Family at the bedside and updated on POC.  For vital signs and complete assessments, please see documentation flow sheet.  Will continue to monitor.

## 2022-07-15 NOTE — PROGRESS NOTES
CV ICU PROGRESS NOTE  07/15/2022        CO-MORBIDITIES  1. Cardiogenic shock (H)  (primary encounter diagnosis)  2. Ischemic cardiomyopathy  3. Heart failure with reduced ejection fraction, NYHA class III (H)  4. Coronary artery disease involving native coronary artery of native heart without angina pectoris      ASSESSMENT Dandy Sands is a 61 year old male with a PMH of CAD s/p PCI to LAD, MI, ICM, acute on chronic heart failure, s/p CRT-D, severe MR, antiphospholipid antibody syndrome on chronic warfarin, SLE, HTN, HLD, recent hospitalization 5/16-5/26 s/p RCA, LAD stenting who underwent RVAD (29F Protek duo RIJ) LVAD placement (HeartMate 3) on 7/8/22 by Dr. Zamora. Intraoperative course complicated by IABP dysfunction and RV failure, requiring crash onto bypass / vasopressor support, NO, and protek placement. Now s/p chest closure and NO wean 7/11. Return to OR for hemopericardium (7/12) and chest re-closure 7/13.    Changes 7/14:  - Minimal vent settings, PST  - Rass -1 to -2  - AC plan: straight rate heparin 400U/hr  - Stop cefepime / Vancomycin this evening since chest closed   - Hydralazine 10gm q8h  - Hydralazine prn MAP >90  - Begin empiric Zosyn for 7/12 sputum positive (4+ candida, 1+ ecoli, 3+ enterobacter )  - Dental consult for implant dislodgement (on bedside table)  - Hgb 2PM  - Very Low Intensity Heparin   - Discontinue Amiodarone, rhythm now sinus     PLAN:   Neuro/ pain/ sedation:  #Acute Postoperative pain  #Depression   #Anxiety due to a medical condition  #Insomnia  - Monitor neurological status. Notify the MD for any acute changes in exam.  - Pain: fentanyl gtt. Scheduled tylenol. PRN tylenol, oxycodone, dilaudid.  - Sedation: propofol gtt, RASS -1 to -2  - Duloxetine 30mg  - Holding PTA meds: hydroxyzine 25mg PRN, zolpidem 5mg, melatonin 3mg, lorazepam 0.5mg    Pulmonary:   #Acute hypoxic respiratory failure on iMV  #Mild-moderate emphysema  #History of COVID-19  - Off nitric 7/12  -  Mechanical ventilation, wean after off chest closure   - PST, possible extubation this afternoon    Cardiovascular:    #S/p LVAD placement (HeartMate 3) on 22   #S/p RVAD (29F Protek duo RIJ) on 22  #S/p chest closure   #Cardiogenic shock  #Advanced ischemic cardiomyopathy, class IV, stage D  #CAD s/p PCI to LAD ()  #S/p CRT-D ()  #History of MI ()  #Severe MR  #Antiphospholipid antibody syndrome on chronic warfarin  #HTN, HLD  #Recent hospitalization - s/p RCA, LAD stenting  #Atrial fibrillation   - Monitor hemodynamic status. Chest closure and oxygenator splicing . Reopened  for I&D and left open, plan for closure .  - Goal MAP > 65   - PTA meds: atorvastatin 40mg, clopidogrel 75mg, lasix 40mg, warfarin 5mg  - LVAD management per Advanced [facility] service   - flows stable, circuit functioning w/o clots/fibrin  - AC Plan: straight rate 400U/hr   - Hydralazine 10gm q8h  - Hydralazine prn MAP >90     GI / Nutrition:   #GERD  #Congestive hepatopathy in the setting of cardiac insuffiency  - NPO 2/2 intubation   - Titrate NJT to goal  - Bowel regimen (miralax / senna)  - Nutrition consulted.  - Trend LFT's    Hematemesis / oral mucosal bleedin/12 x2, 7/13 x1 (oral, around ETT/ETT clear).   - continue PPI BID x 72 hours (7/15 change after 12:00 noon)   - hgb stable   - OG without patience bloody output     Renal/ Fluid Balance:    - Strict I/O, daily weights   - Avoid/limit nephrotoxins as able  - Replete lytes PRN per protocol  - PTA meds: tamsulosin 0.4mg (held)  - Fluid goal net even      Endocrine:    #Stress induced hyperglycemia  Preop A1c 5.5%  - Continue Insulin gtt perioperatively   - Goal BG <180 for optimal healing       ID/ Antibiotics:  #Periopearive antibiosis (s/p course of Cefepime, vancomycin, rifampin, fluconazole)  - Discontinue Cefepime and Vancomycin (-), chest now closed   - Trend fever curve   - Pan culture (): Bcx x2, UA/UC -> negative     -  Endotracheal sputum culture (4+ candida albicans, 1+ E.Coli, 3+ enterococcus faecalis)  - Stat Zosyn, pending sensitivities     Heme:     #Acute blood loss anemia  #Acute blood loss thrombocytopenia  #History of DVT and PE   #Antiphospholipid antibody syndrome  #History of iron deficiency anemia  - Monitor for s/sx of bleeding  - s/p 3u pRBC, 2u Plt 7/12 perioperative needs  - Trend CBC and coags.  - Hgb goal > 8  - Plt goal > 20    Rheumatology:  #SLE  - Chronic, symptoms include arthritis, photosensitivity, fatigue, and skin manifestations  - PTA meds: hydroxychloroquine 200mg, hold 1 week until post repeat  chest closure 7/13    Prophylaxis:    - DVT prophylaxis: SCD only   - PPI  - Bowel regimen  - PT/OT    Lines / Tubes / Drains:  - R brachial triple lumen PICC  - L upper arm PIV  - Left radial A-line       Disposition:  - CV ICU.        ====================================================    Patient seen, findings and plan discussed with CV ICU staff, Dr. Samano.      Rashard Stephenson MD  Anesthesiology, PGY3    ====================================================        SUBJECTIVE  - Chest closed, stable post operatively   - Patient intubated and sedated     OBJECTIVE    1. VITAL SIGNS    Temp:  [98.6  F (37  C)-100.2  F (37.9  C)] 98.8  F (37.1  C)  Pulse:  [] 123  Resp:  [5-44] 30  BP: (123)/(89) 123/89  MAP:  [63 mmHg-104 mmHg] 77 mmHg  Arterial Line BP: ()/() 83/71  FiO2 (%):  [30 %-35 %] 35 %  SpO2:  [83 %-100 %] 99 %  Vent Mode: CMV/AC  (Continuous Mandatory Ventilation/ Assist Control)  FiO2 (%): 35 %  Resp Rate (Set): 12 breaths/min  Tidal Volume (Set, mL): 450 mL  PEEP (cm H2O): 8 cmH2O  Pressure Support (cm H2O): 7 cmH2O  Resp: 30        2. INTAKE/ OUTPUT    I/O last 3 completed shifts:  In: 3773.24 [I.V.:1688.24; NG/GT:585]  Out: 2061 [Urine:974; Emesis/NG output:280; Chest Tube:807]      3. PHYSICAL EXAMINATION    General: sedated  Neuro: RAAS -2   Resp: intubated  CV: SR  Abdomen:  Soft, Non-distended  Skin/Incisions: chest closed, right internal jugular cannulation, left femoral CVC/arterial line.  Extremities: warm and well perfused. LUE ecchymosis, unchanged      4. INVESTIGATIONS    Arterial Blood Gases   Recent Labs   Lab 07/15/22  0633 07/15/22  0346 07/14/22 2357 07/14/22  1950   PH 7.45 7.42 7.43 7.46*   PCO2 31* 34* 33* 30*   PO2 313* 127* 120* 113*   HCO3 21 22 22 21     Complete Blood Count   Recent Labs   Lab 07/15/22  1027 07/15/22  0345 07/14/22 2149 07/14/22  1950   WBC 21.5* 17.8* 17.6* 16.6*   HGB 7.2* 7.7* 7.3* 7.3*   * 120* 130* 110*     Basic Metabolic Panel  Recent Labs   Lab 07/15/22  1050 07/15/22  1027 07/15/22  0850 07/15/22  0637 07/15/22  0352 07/15/22  0345 07/14/22  2154 07/14/22  2149 07/14/22  2000 07/14/22  1950   NA  --  137  --   --   --  135*  --  133*  --  135*   POTASSIUM  --  3.9  --   --   --  4.0  --  4.1  --  4.2   CHLORIDE  --  110*  --   --   --  107  --  104  --  106   CO2  --  18*  --   --   --  19*  --  17*  --  19*   BUN  --  24.7*  --   --   --  26.3*  --  27.6*  --  28.6*   CR  --  0.51*  --   --   --  0.58*  --  0.57*  --  0.58*   * 140* 158* 127*   < > 119*   < > 150*   < > 145*    < > = values in this interval not displayed.     Liver Function Tests  Recent Labs   Lab 07/15/22  1027 07/15/22  0345 07/14/22 2149 07/14/22  1528 07/14/22  0953 07/14/22  0337 07/13/22  0948 07/13/22  0354 07/12/22  1007 07/12/22  0349 07/09/22  0408 07/08/22  2213 07/08/22  1843 07/08/22  1643   AST  --   --   --   --   --   --   --   --   --   --   --  159*  --  134*   ALT  --  20  --   --   --  23  --  25  --  21   < > 29  --  25   ALKPHOS  --   --   --   --   --   --   --   --   --   --   --  59  --  51   BILITOTAL  --   --   --   --   --   --   --   --   --   --   --  1.0  --  1.0   ALBUMIN  --  2.3*  --   --   --  2.3*  --  2.9*  --  2.3*   < > 2.5*  --  2.3*   INR 1.16* 1.23* 1.24* 1.20*   < > 1.21*   < > 1.18*   < > 1.26*   < > 1.45*   < >  1.87*    < > = values in this interval not displayed.     Pancreatic Enzymes  No lab results found in last 7 days.  Coagulation Profile  Recent Labs   Lab 07/15/22  1027 07/15/22  0345 07/15/22  0020 07/14/22  2149 07/14/22  1528   INR 1.16* 1.23*  --  1.24* 1.20*   PTT 58* 53* 61*  --  47*         5. RADIOLOGY    Recent Results (from the past 24 hour(s))   XR Feeding Tube Placement    Narrative    FEEDING TUBE PLACEMENT 7/11/2022 2:00 PM    INDICATION: Nutritional needs, open chest     COMPARISON: None.    FLUOROSCOPY TIME: 2.05 minutes    FINDINGS: The feeding tube was advanced under fluoroscopic guidance  with final position of tip in the second portion of the duodenum. A  small amount of barium was injected to demonstrate placement within  the small bowel. The tube was flushed with sterile water and secured  via nasal bridle. There were no complications of the procedure.   Partially visualized chest tubes/drains and IVC filter.      Impression    IMPRESSION: Uncomplicated feeding tube placement with tip in the  second portion of the duodenum.    I, JOSE M OSHEA MD, attest that I was present for all critical  portions of the procedure and was immediately available to provide  guidance and assistance during the remainder of the procedure.    I have personally reviewed the examination and initial interpretation  and I agree with the findings.    JOSE M OSHEA MD         SYSTEM ID:  [id number]   Cardiac Device Check - Inpatient   Result Value    Date Time Interrogation Session [id number]    Implantable Pulse Generator  Medtronic    Implantable Pulse Generator Model [id number] Eliseo TIRADO DR    Implantable Pulse Generator Serial Number [id number]    Type Interrogation Session In Clinic    Clinic Name [facility] [place]    Implantable Pulse Generator Type Defibrillator    Implantable Pulse Generator Implant Date 20180412    Implantable Lead  Medtronic    Implantable Lead Model 5076  CapSureFix Novus    Implantable Lead Serial Number [id number]    Implantable Lead Implant Date 20060306    Implantable Lead Polarity Type Bipolar Lead    Implantable Lead Location Detail 1 UNKNOWN    Implantable Lead Location Right Atrium    Implantable Lead  Medtronic    Implantable Lead Model 6949 Fidel Proctors    Implantable Lead Serial Number [id number]    Implantable Lead Implant Date 20060306    Implantable Lead Polarity Type Bipolar Lead    Implantable Lead Location Detail 1 UNKNOWN    Implantable Lead Location Right Ventricle    Marcos Setting Mode (NBG Code) MVP_AAI_DDD    Marcos Setting Lower Rate Limit 50    Marcos Setting Maximum Tracking Rate 130    Marcos Setting Maximum Sensor Rate 115    Marcos Setting Hysterisis Rate DISABLED    Marcos Setting TRISTEN Delay Low 350    Marcos Setting PAV Delay Low 350    Marcos Setting AT Mode Switch Rate 171    Lead Channel Setting Sensing Polarity Bipolar    Lead Channel Setting Sensing Anode Location Right Atrium    Lead Channel Setting Sensing Anode Terminal Ring    Lead Channel Setting Sensing Cathode Location Right Atrium    Lead Channel Setting Sensing Cathode Terminal Tip    Lead Channel Setting Sensing Sensitivity 0.3    Lead Channel Setting Sensing Polarity Bipolar    Lead Channel Setting Sensing Anode Location Right Ventricle    Lead Channel Setting Sensing Anode Terminal Ring    Lead Channel Setting Sensing Cathode Location Right Ventricle    Lead Channel Setting Sensing Cathode Terminal Tip    Lead Channel Setting Sensing Sensitivity 0.3    Lead Channel Setting Pacing Polarity Bipolar    Lead Channel Setting Pacing Anode Location Right Atrium    Lead Channel Setting Pacing Anode Terminal Ring    Lead Channel Setting Sensing Cathode Location Right Atrium    Lead Channel Setting Sensing Cathode Terminal Tip    Lead Channel Setting Pacing Pulse Width 0.4    Lead Channel Setting Pacing Amplitude 1.5    Lead Channel Setting Pacing Capture Mode Adaptive     Lead Channel Setting Pacing Polarity Bipolar    Lead Channel Setting Pacing Anode Location Right Ventricle    Lead Channel Setting Pacing Anode Terminal Ring    Lead Channel Setting Sensing Cathode Location Right Ventricle    Lead Channel Setting Sensing Cathode Terminal Tip    Lead Channel Setting Pacing Pulse Width 1    Lead Channel Setting Pacing Amplitude 5    Lead Channel Setting Pacing Capture Mode Adaptive    Zone Setting Type Category VF    Zone Setting Detection Interval 320    Zone Setting Detection Beats Numerator 30    Zone Setting Detection Beats Denominator 40    Zone Setting Type Category VT    Zone Setting Detection Interval 280    Zone Setting Type Category VT    Zone Setting Detection Interval 350    Zone Setting Type Category VT    Zone Setting Detection Interval 400    Zone Setting Type Category ATRIAL_FIBRILLATION    Zone Setting Type Category AT/AF    Zone Setting Detection Interval 350    Lead Channel Impedance Value 304    Lead Channel Sensing Intrinsic Amplitude 0.875    Lead Channel Sensing Intrinsic Amplitude 1.5    Lead Channel Pacing Threshold Amplitude 0.75    Lead Channel Pacing Threshold Pulse Width 0.4    Lead Channel Impedance Value 703    Lead Channel Impedance Value 608    Lead Channel Sensing Intrinsic Amplitude 0.875    Lead Channel Sensing Intrinsic Amplitude 1.125    Lead Channel Pacing Threshold Amplitude 2.5    Lead Channel Pacing Threshold Pulse Width 0.4    Battery Date Time of Measurements 79827619838482    Battery Status OK    Battery RRT Trigger 2.727    Battery Remaining Longevity 63    Battery Voltage 2.96    Capacitor Charge Type Reformation    Capacitor Last Charge Date Time 31742129289332    Capacitor Charge Time 3.973    Capacitor Charge Energy 18    Macros Statistic Date Time Start 73055594011504    Marcos Statistic Date Time End 26407556444208    Marcos Statistic RA Percent Paced 0.1    Marcos Statistic RV Percent Paced 0.1    Marcos Statistic AP  Percent 0.02     Marcos Statistic AS  Percent 0.08    Marcos Statistic AP VS Percent 0.08    Marcos Statistic AS VS Percent 99.82    Atrial Tachy Statistic Date Time Start 59102348228980    Atrial Tachy Statistic Date Time End 14650145250273    Atrial Tachy Statistic AT/AF [place] Percent 3.1    Therapy Statistic Recent Shocks Delivered 0    Therapy Statistic Recent Shocks Aborted 0    Therapy Statistic Recent ATP Delivered 0    Therapy Statistic Recent Date Time Start 72397978733322    Therapy Statistic Recent Date Time End 14331670440217    Therapy Statistic Total Shocks Delivered 1    Therapy Statistic Total Shocks Aborted 0    Therapy Statistic Total ATP Delivered 0    Therapy Statistic Total  Date Time Start 51136857927069    Therapy Statistic Total  Date Time End 33985182967513    Episode Statistic Recent Count 28    Episode Statistic Type Category AT/AF    Episode Statistic Recent Count 0    Episode Statistic Type Category SVT    Episode Statistic Recent Count 0    Episode Statistic Type Category VT    Episode Statistic Recent Count 0    Episode Statistic Type Category VF    Episode Statistic Recent Count 0    Episode Statistic Type Category VT    Episode Statistic Recent Count 0    Episode Statistic Type Category VT    Episode Statistic Recent Count 0    Episode Statistic Type Category VT    Episode Statistic Recent Date Time Start 37492508781502    Episode Statistic Recent Date Time End 41936853177055    Episode Statistic Recent Date Time Start 60585666416523    Episode Statistic Recent Date Time End 37187386813879    Episode Statistic Recent Date Time Start 41388623197246    Episode Statistic Recent Date Time End 11841582900737    Episode Statistic Recent Date Time Start 10831186926493    Episode Statistic Recent Date Time End 99769329215719    Episode Statistic Recent Date Time Start 76054608045597    Episode Statistic Recent Date Time End 84158644948995    Episode Statistic Recent Date Time Start 89417338243467    Episode  Statistic Recent Date Time End 30190850291363    Episode Statistic Recent Date Time Start 51741904658125    Episode Statistic Recent Date Time End 11647007783134    Episode Statistic Total Count 33    Episode Statistic Type Category AT/AF    Episode Statistic Total Count 0    Episode Statistic Type Category SVT    Episode Statistic Total Count 9    Episode Statistic Type Category VT    Episode Statistic Total Count 1    Episode Statistic Type Category VF    Episode Statistic Total Count 0    Episode Statistic Type Category VT    Episode Statistic Total Count 0    Episode Statistic Type Category VT    Episode Statistic Total Count 1    Episode Statistic Type Category VT    Episode Statistic Total Date Time Start 75846615893995    Episode Statistic Total Date Time End 65142450164775    Episode Statistic Total Date Time Start 04993184054981    Episode Statistic Total Date Time End 70854281457142    Episode Statistic Total Date Time Start 47890780751457    Episode Statistic Total Date Time End 23193596651442    Episode Statistic Total Date Time Start 33031606888945    Episode Statistic Total Date Time End 42514529292911    Episode Statistic Total Date Time Start 39070981379741    Episode Statistic Total Date Time End 14291005989191    Episode Statistic Total Date Time Start 79701601455576    Episode Statistic Total Date Time End 30645974219419    Episode Statistic Total Date Time Start 64072924752355    Episode Statistic Total Date Time End 11035362568389    Episode Identifier 56    Episode Type Category Monitor    Episode Date Time [id number]    Episode Duration 61    Episode Identifier 55    Episode Type Category Monitor    Episode Date Time [id number]    Episode Duration 144    Episode Identifier 54    Episode Type Category Monitor    Episode Date Time [id number]    Episode Duration 93    Episode Identifier 53    Episode Type Category Monitor    Episode Date Time 40540791110278    Episode Duration 214     Episode Identifier 52    Episode Type Category Monitor    Episode Date Time [id number]    Episode Duration 309    Episode Identifier 51    Episode Type Category Monitor    Episode Date Time [id number]    Episode Duration 316    Episode Identifier 50    Episode Type Category Monitor    Episode Date Time [id number]    Episode Duration 180    Episode Identifier 49    Episode Type Category Monitor    Episode Date Time [id number]    Episode Duration 597    Episode Identifier 48    Episode Type Category Monitor    Episode Date Time [id number]    Episode Duration 102    Episode Identifier 47    Episode Type Category Monitor    Episode Date Time [id number]    Episode Duration 970    Episode Identifier 46    Episode Type Category Monitor    Episode Date Time [id number]    Episode Duration 350    Episode Identifier 45    Episode Type Category Monitor    Episode Date Time [id number]    Episode Duration 351    Episode Identifier 44    Episode Type Category Monitor    Episode Date Time [id number]    Episode Duration 102    Episode Identifier 43    Episode Type Category Monitor    Episode Date Time [id number]    Episode Duration 353    Episode Identifier 42    Episode Type Category Monitor    Episode Date Time [id number]    Episode Duration 328    Episode Identifier 41    Episode Type Category Monitor    Episode Date Time [id number]    Episode Duration 152    Episode Identifier 40    Episode Type Category Monitor    Episode Date Time [id number]    Episode Duration 235    Episode Identifier 39    Episode Type Category Monitor    Episode Date Time [id number]    Episode Duration 296    Episode Identifier 38    Episode Type Category Monitor    Episode Date Time [id number]    Episode Duration 203    Episode Identifier 37    Episode Type Category Monitor    Episode Date Time [id number]    Episode Duration 182    Episode Identifier 36    Episode Type Category Monitor     Episode Date Time [id number]    Episode Duration 361    Episode Identifier 35    Episode Type Category Monitor    Episode Date Time [id number]    Episode Duration 297    Episode Identifier 34    Episode Type Category Monitor    Episode Date Time [id number]    Episode Duration 99    Episode Identifier 33    Episode Type Category Monitor    Episode Date Time [id number]    Episode Duration 228    Episode Identifier 32    Episode Type Category Monitor    Episode Date Time [id number]    Episode Duration 278    Episode Identifier 31    Episode Type Category Monitor    Episode Date Time [id number]    Episode Duration 186    Episode Identifier 30    Episode Type Category Monitor    Episode Date Time [id number]    Episode Duration 378    Episode Identifier 29    Episode Type Category Monitor    Episode Date Time [id number]    Episode Duration 322    Narrative    Patient seen in OR #27 pre-operatively for evaluation and iterative programming of their ICD per MD orders.     Device: Photomedex [id number] Evera XT   Normal device function  Mode: AAI>DDD  bpm  AP: <0.1%  : <0.1%  Intrinsic rhythm:  bpm  Thoracic Impedance: Below the reference line suggesting possible intrathoracic fluid accumulation.  Short V-V intervals: 0  Lead Trends Appear Stable: yes  Estimated battery longevity to RRT = 5.2 years  Atrial Arrhythmia: 28 AT/AF episodes recorded - 1 min 1 sec - 16 min 10 sec.  AF [place]: 3.1%  Anticoagulant:   Ventricular Arrhythmia: 0  Setting Changes: VY detection, FVT detection and VT monitor zones programmed off.     TRINA Amador RN    Dual lead ICD    I have reviewed and interpreted the device interrogation, settings, programming and nurse's summary. The device is functioning within normal device parameters. I agree with the current findings, assessment and plan.   XR Chest Port 1 View    Narrative    EXAM: TEMPORARY  7/11/2022 5:37 PM     HISTORY:  Chest closure, intraoperative        COMPARISON:  7/11/2022    FINDINGS  Technique: Semiupright AP view of the chest.     Devices: Single view of the chest. The endotracheal tube tip projects  over the midthoracic trachea. Gastric tube passes below the left  hemidiaphragm. Stable LVAD and left chest wall cardiac device with  atrial and ventricular leads. Right upper extremity PICC tip is  obscured in the region of the high SVC. Stable positioning of right IJ  RVAD with tip in the pulmonary artery. Mediastinal drains and left  basilar chest tube. Chest is open with surgical sponges projected over  the mediastinum.    Unchanged cardiomegaly. Small bilateral pleural effusions. No  discernible pneumothorax.       Impression    IMPRESSION:   No unexpected retained foreign body. Remaining support devices stable  in position.    Results reported to LITA Camp, OR circulator 7/11/2022 1749 hours    I have personally reviewed the examination and initial interpretation  and I agree with the findings.    KASI EAST MD         SYSTEM ID:  [id number]   XR Abdomen Port 1 View    Narrative    XR ABDOMEN PORT 1 VIEWS  7/11/2022 6:56 PM      HISTORY: post chest closure and verification of lines    COMPARISON: 7/11/2022, 7/3/2022    FINDINGS:   Single AP view of the abdomen. Stable thoracic findings. Stable IVC  filter. Feeding tube tip overlying the proximal jejunum with residual  enteric contrast over the stomach. Relative paucity of bowel gas. No  definite pneumatosis or portal venous gas.      Impression    IMPRESSION:   Feeding tube tip overlying the proximal jejunum.    I have personally reviewed the examination and initial interpretation  and I agree with the findings.    KASI EAST MD         SYSTEM ID:  [id number]   XR Chest Port 1 View    Narrative    EXAM: XR CHEST PORT 1 VIEW  7/12/2022 1:15 AM     HISTORY:  RVAD/LVAD/ETT       COMPARISON:  7/11/2022    FINDINGS: Single view of the chest. Postsurgical changes of the chest  with median sternotomy  wires. Endotracheal tube tip projects over the  mid thoracic trachea. Feeding tube courses below the diaphragm and  beyond the field-of-view. The tip is at least to the body of the  stomach. Right upper extremity PICC tip is obscured in the region of  mid SVC. Right IJ RVAD with tip over the distal main pulmonary trunk.  Stable left chest wall cardiac device and LVAD. Stable mediastinal  drains and left basilar chest tube.     Stable enlarged cardiac silhouette. Small bilateral pleural effusions.  No appreciable pneumothorax. Similar perihilar and bibasilar  opacities.      Impression    IMPRESSION:   1. Stable support devices.  2. Stable small pleural effusions and bilateral pulmonary opacities.    I have personally reviewed the examination and initial interpretation  and I agree with the findings.    LAI HERNADEZ MD         SYSTEM ID:  [id number]   Cardiac Device Check - Inpatient   Result Value    Date Time Interrogation Session [id number]    Implantable Pulse Generator  Medtronic    Implantable Pulse Generator Model [id number] Evera XT     Implantable Pulse Generator Serial Number [id number]    Type Interrogation Session In Clinic    Clinic Name [facility]    Implantable Pulse Generator Type Defibrillator    Implantable Pulse Generator Implant Date 20180412    Implantable Lead  Medtronic    Implantable Lead Model 5076 CapSureFix Novus    Implantable Lead Serial Number [id number]    Implantable Lead Implant Date 20060306    Implantable Lead Polarity Type Bipolar Lead    Implantable Lead Location Detail 1 UNKNOWN    Implantable Lead Location Right Atrium    Implantable Lead  Medtronic    Implantable Lead Model 6949 [employer] [place]    Implantable Lead Serial Number [id number]    Implantable Lead Implant Date 20060306    Implantable Lead Polarity Type Bipolar Lead    Implantable Lead Location Detail 1 UNKNOWN    Implantable Lead Location Right Ventricle     Marcos Setting Mode (NBG Code) MVP_AAI_DDD    Marcos Setting Lower Rate Limit 50    Marcos Setting Maximum Tracking Rate 130    Marcos Setting Maximum Sensor Rate 115    Marcos Setting Hysterisis Rate DISABLED    Marcos Setting TRISTEN Delay Low 350    Marcos Setting PAV Delay Low 350    Marcos Setting AT Mode Switch Rate 171    Lead Channel Setting Sensing Polarity Bipolar    Lead Channel Setting Sensing Anode Location Right Atrium    Lead Channel Setting Sensing Anode Terminal Ring    Lead Channel Setting Sensing Cathode Location Right Atrium    Lead Channel Setting Sensing Cathode Terminal Tip    Lead Channel Setting Sensing Sensitivity 0.3    Lead Channel Setting Sensing Polarity Bipolar    Lead Channel Setting Sensing Anode Location Right Ventricle    Lead Channel Setting Sensing Anode Terminal Ring    Lead Channel Setting Sensing Cathode Location Right Ventricle    Lead Channel Setting Sensing Cathode Terminal Tip    Lead Channel Setting Sensing Sensitivity 0.3    Lead Channel Setting Pacing Polarity Bipolar    Lead Channel Setting Pacing Anode Location Right Atrium    Lead Channel Setting Pacing Anode Terminal Ring    Lead Channel Setting Sensing Cathode Location Right Atrium    Lead Channel Setting Sensing Cathode Terminal Tip    Lead Channel Setting Pacing Pulse Width 0.4    Lead Channel Setting Pacing Amplitude 1.5    Lead Channel Setting Pacing Capture Mode Adaptive    Lead Channel Setting Pacing Polarity Bipolar    Lead Channel Setting Pacing Anode Location Right Ventricle    Lead Channel Setting Pacing Anode Terminal Ring    Lead Channel Setting Sensing Cathode Location Right Ventricle    Lead Channel Setting Sensing Cathode Terminal Tip    Lead Channel Setting Pacing Pulse Width 1    Lead Channel Setting Pacing Amplitude 5    Lead Channel Setting Pacing Capture Mode Adaptive    Zone Setting Type Category VF    Zone Setting Detection Interval 320    Zone Setting Detection Beats Numerator 30    Zone Setting  Detection Beats Denominator 40    Zone Setting Type Category VT    Zone Setting Detection Interval 280    Zone Setting Type Category VT    Zone Setting Detection Interval 350    Zone Setting Type Category VT    Zone Setting Detection Interval 400    Zone Setting Type Category ATRIAL_FIBRILLATION    Zone Setting Type Category AT/AF    Zone Setting Detection Interval 350    Lead Channel Impedance Value 285    Lead Channel Sensing Intrinsic Amplitude 0.625    Lead Channel Pacing Threshold Amplitude 0.75    Lead Channel Pacing Threshold Pulse Width 0.4    Lead Channel Impedance Value 665    Lead Channel Impedance Value 551    Lead Channel Sensing Intrinsic Amplitude 0.75    Lead Channel Pacing Threshold Amplitude 2.75    Lead Channel Pacing Threshold Pulse Width 1    Battery Date Time of Measurements 20220712093725    Battery Status OK    Battery RRT Trigger 2.727    Battery Remaining Longevity 63    Battery Voltage 2.94    Capacitor Charge Type Reformation    Capacitor Last Charge Date Time [id number]    Capacitor Charge Time 3.973    Capacitor Charge Energy 18    Marcos Statistic Date Time Start 30929629850249    Marcos Statistic Date Time End 20220712093538    Marcos Statistic RA Percent Paced 0    Marcos Statistic RV Percent Paced 0    Marcos Statistic AP  Percent 0    Marcos Statistic AS  Percent 0    Marcos Statistic AP VS Percent 0    Marcos Statistic AS VS Percent 100    Atrial Tachy Statistic Date Time Start [id number]    Atrial Tachy Statistic Date Time End 08892421885429    Atrial Tachy Statistic AT/AF [place] Percent 0    Therapy Statistic Recent Shocks Delivered 0    Therapy Statistic Recent Shocks Aborted 0    Therapy Statistic Recent ATP Delivered 0    Therapy Statistic Recent Date Time Start 90999246480739    Therapy Statistic Recent Date Time End 08020961635558    Therapy Statistic Total Shocks Delivered 1    Therapy Statistic Total Shocks Aborted 0    Therapy Statistic Total ATP Delivered 0     Therapy Statistic Total  Date Time Start 25518299943231    Therapy Statistic Total  Date Time End 11806172512658    Episode Statistic Recent Count 0    Episode Statistic Type Category AT/AF    Episode Statistic Recent Count 0    Episode Statistic Type Category SVT    Episode Statistic Recent Count 0    Episode Statistic Type Category VT    Episode Statistic Recent Count 0    Episode Statistic Type Category VF    Episode Statistic Recent Count 0    Episode Statistic Type Category VT    Episode Statistic Recent Count 0    Episode Statistic Type Category VT    Episode Statistic Recent Count 0    Episode Statistic Type Category VT    Episode Statistic Recent Date Time Start 24285154469232    Episode Statistic Recent Date Time End 20290999641944    Episode Statistic Recent Date Time Start 02456596519776    Episode Statistic Recent Date Time End 34934092983320    Episode Statistic Recent Date Time Start 54456382669094    Episode Statistic Recent Date Time End 03615198973954    Episode Statistic Recent Date Time Start 26767691251409    Episode Statistic Recent Date Time End 21126724213372    Episode Statistic Recent Date Time Start 34750357010870    Episode Statistic Recent Date Time End 61002452120016    Episode Statistic Recent Date Time Start 62908008280306    Episode Statistic Recent Date Time End 87768308769286    Episode Statistic Recent Date Time Start 58139194877978    Episode Statistic Recent Date Time End 83809283945223    Episode Statistic Total Count 33    Episode Statistic Type Category AT/AF    Episode Statistic Total Count 0    Episode Statistic Type Category SVT    Episode Statistic Total Count 9    Episode Statistic Type Category VT    Episode Statistic Total Count 1    Episode Statistic Type Category VF    Episode Statistic Total Count 0    Episode Statistic Type Category VT    Episode Statistic Total Count 0    Episode Statistic Type Category VT    Episode Statistic Total Count 1    Episode Statistic  Type Category VT    Episode Statistic Total Date Time Start 20180412151624    Episode Statistic Total Date Time End 20220712093538    Episode Statistic Total Date Time Start 20180412151624    Episode Statistic Total Date Time End 20220712093538    Episode Statistic Total Date Time Start 20180412151624    Episode Statistic Total Date Time End 20220712093538    Episode Statistic Total Date Time Start 20180412151624    Episode Statistic Total Date Time End 20220712093538    Episode Statistic Total Date Time Start 20180412151624    Episode Statistic Total Date Time End 20220712093538    Episode Statistic Total Date Time Start 20180412151624    Episode Statistic Total Date Time End 20220712093538    Episode Statistic Total Date Time Start 20180412151624    Episode Statistic Total Date Time End 20220712093538    Narrative    Patient seen on [facility] 4E for evaluation and iterative programming of their ICD per MD orders, post-op chest washout and closure.     Device: [employer] [id number] Evera XT DR  Normal device function. R Waves have drastically changed, today measuring 0.8 mV. RV threshold has also risen to 2.75 V @ 1 ms. RV impedance shows an increase in unipolar and unipolar.  Mode: AAI-DDD  bpm  AP: 0%  : 0%  Intrinsic Rhythm:  bpm  Short V-V intervals: 0  Lead Trends Appear Stable.   Estimated battery longevity to RRT = 5.2 years. Battery voltage = 2.95 V.   No Arrhythmias Recorded as Detection was ooff for surgery.  Setting Changes: ICD Tachy Detection and Therapies Turned ON.    Plan: Follow pt PRN while hospitalized  JESU Cueva RN    Dual lead ICD    I have reviewed and interpreted the device interrogation, settings, programming and nurse's summary. The device is functioning within normal device parameters. I agree with the current findings, assessment and plan.         6. LINES/DRAINS/AIRWAY    Peripheral IV 07/05/22 Left;Anterior Upper forearm (Active)   Site Assessment WDL 07/15/22 0800   Line Status  Saline locked 07/15/22 0800   Dressing Intervention New dressing  07/12/22 0000   Phlebitis Scale 0-->no symptoms 07/15/22 0800   Infiltration Scale 0 07/15/22 0800   If infiltrated, was a vesicant infusing? No 07/11/22 1800   Number of days: 10       PICC Triple Lumen 06/17/22 Right Brachial vein medial (Active)   Site Assessment WDL 07/15/22 0800   Dressing Intervention Chlorhexidine patch;Transparent 07/15/22 0800   Dressing Change Due 07/17/22 07/15/22 0400   PICC Comment from OSH 06/17/22 1700   [place]borne - Status infusing 07/15/22 0800   Osborne - Cap Change Due 07/19/22 07/15/22 0400   Red - Status infusing 07/15/22 0800   Red - Cap Change Due 07/19/22 07/15/22 0400   White - Status infusing 07/15/22 0800   White - Cap Change Due 07/19/22 07/15/22 0400   Extravasation? No 07/14/22 2000   Line Necessity Yes, meets criteria 07/15/22 0800   Number of days: 28       Introducer 07/08/22 Femoral Left (Active)   Specific Qualities Capped 07/15/22 0800   (Retired) Dressing Status Clean, dry, intact 07/15/22 0800   Dressing Intervention Dressing changed 07/10/22 0000   Dressing Change Due 07/17/22 07/15/22 0400   Number of days: 7       Arterial Line 07/14/22 Radial (Active)   Site Assessment WDL 07/15/22 0800   Line Status Pulsatile blood flow 07/15/22 0800   Arterine Line Cap Change Due 07/19/22 07/15/22 0400   Art Line Waveform Appropriate;Square wave test performed 07/15/22 0800   Art Line Interventions Leveled;Connections checked and tightened 07/15/22 0800   Color/Movement/Sensation Capillary refill less than 3 sec 07/15/22 0800   Line Necessity Yes, meets criteria 07/15/22 0800   Dressing Type Transparent 07/15/22 0800   Dressing Status Clean, dry, intact 07/15/22 0800   Dressing Change Due 07/17/22 07/15/22 0400   Number of days: 1       Venous Sheath 07/08/22 Other (comment) Right (Active)   Specific Qualities Sutured;Left in Place 07/15/22 0800   Site Assessment UTV 07/15/22 0800   Dressing Type Transparent 07/15/22  0800   Dressing Intervention Dressing reinforced 07/13/22 0400   Venous Sheath Comment Protek Duo 07/15/22 0800   Number of days: 7       CVC Double Lumen Left Femoral (Active)   Site Assessment WDL 07/15/22 0800   Dressing Type Chlorhexidine disk;Transparent 07/15/22 0800   Dressing Status clean;dry;intact 07/15/22 0800   Dressing Intervention dressing changed 07/10/22 0000   Dressing Change Due 07/17/22 07/15/22 0400   Line Necessity yes, meets criteria 07/15/22 0800   Brown - Status saline locked 07/15/22 0800   Brown - Cap Change Due 07/19/22 07/15/22 0400   White - Status saline locked 07/15/22 0800   White - Cap Change Due 07/19/22 07/15/22 0400   Phlebitis Scale 0-->no symptoms 07/15/22 0800   Infiltration? no 07/15/22 0800   Infiltration Scale 0 07/14/22 1600   CVC Comment MAC 07/15/22 0800   Number of days: 7       ETT Cuffed Single 8 mm (Active)   Secured at (cm) 24 cm 07/15/22 0908   Measured from Teeth/Gums 07/15/22 0908   Position Left 07/15/22 0600   Secured by Commercial tube partida 07/15/22 0908   Bite Block None Present 07/15/22 0016   Site Appearance Clean;Dry 07/15/22 0406   Tube Care Site care done 07/15/22 0600   Cuff Assessment Minimal leak technique 07/14/22 1038   Safety Measures Manual resuscitator at bedside;Manual resuscitator/mask/valve in room;Manual resuscitator/PEEP valve in room 07/15/22 0908   Number of days: 7       Chest Tube 1 Anterior Mediastinal 9 [place] (Active)   Site Assessment UTV 07/15/22 1200   Suction -20 cm H2O 07/15/22 1200   Chest Tube Airleak No 07/15/22 1200   Drainage Description Serosanguinous 07/15/22 1200   Dressing Status Normal: Clean, Dry & Intact 07/15/22 1200   Dressing Change Due 07/16/22 07/15/22 0400   Dressing Intervention Gauze 07/15/22 0000   Patency Intervention Tip/Tilt 07/15/22 1200   Chest Tube Clamps at Bedside present 07/15/22 1200   Container Amount 500 07/15/22 1200   Output (ml) 20 ml 07/15/22 1200   Number of days: 7       Chest Tube 2  Anterior Mediastinal 9 [place] citizen (Active)   Site Assessment UTV 07/15/22 1200   Suction -20 cm H2O 07/15/22 1200   Chest Tube Airleak No 07/15/22 1200   Drainage Description Serosanguinous 07/15/22 1200   Dressing Status Normal: Clean, Dry & Intact 07/15/22 1200   Dressing Change Due 07/16/22 07/15/22 0400   Dressing Intervention Transparent 07/15/22 1200   Patency Intervention Tip/Tilt 07/15/22 1200   Chest Tube Clamps at Bedside present 07/15/22 1200   Number of days: 7       Chest Tube 3 Pleural 32 [place] citizen (Active)   Site Assessment UTV 07/15/22 1200   Suction -20 cm H2O 07/15/22 1200   Chest Tube Airleak No 07/15/22 1200   Drainage Description Serosanguinous 07/15/22 1200   Dressing Status Normal: Clean, Dry & Intact 07/15/22 1200   Dressing Change Due 07/16/22 07/15/22 0400   Dressing Intervention Gauze 07/15/22 0000   Patency Intervention Tip/Tilt 07/15/22 1200   Chest Tube Clamps at Bedside present 07/15/22 1200   Container Amount 1740 07/15/22 1200   Output (ml) 40 ml 07/15/22 1200   Number of days: 7       Chest Tube 4 Posterior Pericardial 24 [place] citizen (Active)   Site Assessment UTV 07/15/22 1200   Suction -20 cm H2O 07/15/22 1200   Chest Tube Airleak No 07/15/22 1200   Drainage Description Serosanguinous 07/15/22 1200   Dressing Status Normal: Clean, Dry & Intact 07/15/22 1200   Dressing Change Due 07/16/22 07/15/22 0400   Dressing Intervention Gauze 07/15/22 0000   Patency Intervention Tip/Tilt 07/15/22 1200   Chest Tube Clamps at Bedside present 07/15/22 1200   Number of days: 7       Negative Pressure Wound Therapy Sternum Upper (Active)   Wound Type Surgical 07/15/22 1200   Unit Type Vac Ulta 07/15/22 1200   Target Pressure (mmHg) 125 07/15/22 1200   [place] changed? No 07/15/22 1200   Output (ml) 0 ml 07/15/22 1200   Number of days: 2       NG/OG/NJ Tube Nasojejunal Right nostril (Active)   Site Description WDL 07/15/22 1200   Status Enteral Feedings 07/15/22 1200   Placement Confirmation [place]  unchanged 07/15/22 1200   [place] (cm marking) at nare/mouth 113 cm 07/15/22 1200   Intake (ml) 60 ml 07/15/22 0800   Flush/Free Water (mL) 30 mL 07/15/22 1200   Number of days: 4       NG/OG/NJ Tube Orogastric Right mouth (Active)   Site Description WDL 07/15/22 1200   Status Suction-low intermittent 07/15/22 1200   Drainage Appearance Bile 07/15/22 1200   Placement Confirmation [place] unchanged 07/15/22 1200   [place] (cm marking) at nare/mouth 64 cm 07/15/22 1200   Flush/Free Water (mL) 30 mL 07/15/22 0800   Container Amount 450 mL 07/15/22 1200   Output (ml) 50 ml 07/15/22 1200   Number of days: 3       Rectal Tube (Active)   Balloon fill volume  40 cc 07/15/22 1200   Stool Leakage Small 07/15/22 1200   Rectal Tube Container Volume 0 ml 07/15/22 0800   Rectal Tube Output 0 ml 07/15/22 0800   Number of days: 1       Urethral Catheter 07/08/22 [place];Latex;Temperature probe;Triple-lumen 16 fr (Active)   Tube Description Positional 07/14/22 1600   Catheter Care Done;Catheter wipes 07/15/22 1200   Collection Container Standard 07/15/22 1200   Securement Method Securing device (Describe) 07/15/22 1200   Rationale for Continued Use Strict 1-2 Hour I&O 07/15/22 1200   Urine Output 150 mL 07/15/22 1300   Number of days: 7       Wound Face Pressure injury hospital acquired Stage 1 (Active)   Wound Bed Erythema, non-blanchable, skin intact 07/15/22 0000   Estimated Circumference ( if not measured quarter sized 07/14/22 1200   Drainage Amount None 07/15/22 0000   Dressing Foam 07/15/22 0000   Dressing Status Clean, dry, intact 07/15/22 0000   Number of days: 2       Incision/Surgical Site 06/23/22 Right Chest (Active)   Incision Assessment WDL 07/15/22 0000   Deirdre-Incision Assessment UTV 07/15/22 0000   Closure Liquid bandage;Approximated 07/13/22 0400   Incision Drainage Amount UTV 07/15/22 0000   Drainage Description UTV 07/15/22 0000   Incision Care Wound cleanser 07/13/22 0400   Dressing Intervention Clean, dry,  intact 07/13/22 0400   Number of days: 22       Incision/Surgical Site 07/08/22 Midline Chest (Active)   Incision Assessment UTV 07/15/22 0000   Deirdre-Incision Assessment UTV 07/15/22 0000   Closure Other (Comment) 07/13/22 1600   Incision Drainage Amount Scant 07/11/22 1200   Drainage Description Sanguinous 07/11/22 0400   Incision Care Therapy - negative pressure 07/15/22 0000   Dressing Intervention Clean, dry, intact 07/15/22 0000   Number of days: 7       Incision/Surgical Site 07/13/22 Chest (Active)   Number of days: 2       Incision/Surgical Site 07/13/22 Bilateral Chest (Active)   Closure Sutures 07/13/22 1805   Number of days: 2       Incision/Surgical Site 07/13/22 Medial Sternum (Active)   Incision Assessment UTV 07/15/22 0000   Deirdre-Incision Assessment Pale 07/14/22 0400   Closure Other (Comment) 07/14/22 0400   Incision Drainage Amount None 07/14/22 1600   Incision Care Therapy - negative pressure 07/15/22 0000   Dressing Intervention Clean, dry, intact 07/15/22 0000   Number of days: 2          ====================================================          CV ICU PROGRESS NOTE  07/15/2022        CO-MORBIDITIES  5. Cardiogenic shock (H)  (primary encounter diagnosis)  6. Ischemic cardiomyopathy  7. Heart failure with reduced ejection fraction, NYHA class III (H)  8. Coronary artery disease involving native coronary artery of native heart without angina pectoris      ASSESSMENT Dandy Sands is a 61 year old male with a PMH of CAD s/p PCI to LAD, MI, ICM, acute on chronic heart failure, s/p CRT-D, severe MR, antiphospholipid antibody syndrome on chronic warfarin, SLE, HTN, HLD, recent hospitalization 5/16-5/26 s/p RCA, LAD stenting who underwent RVAD (29F Protek duo RIJ) LVAD placement (HeartMate 3) on 7/8/22 by Dr. Zamora. Intraoperative course complicated by IABP dysfunction and RV failure, requiring crash onto bypass / vasopressor support, NO, and protek placement. Now s/p chest closure and NO wean  7/11. Return to OR for hemopericardium (7/12) and chest re-closure 7/13.    Changes 7/15:  - Minimal vent settings, PST w/ precedex for agitation and tachypnea  - Rass -1 to -2  - AC plan: straight rate heparin 400U/hr  - Begin empiric Zosyn for 7/12 sputum positive (4+ candida, 1+ ecoli, 3+ enterobacter )  - Consider diuresis if concern for volume up mid day   -       PLAN:   Neuro/ pain/ sedation:  #Acute Postoperative pain  #Depression   #Anxiety due to a medical condition  #Insomnia  - Monitor neurological status. Notify the MD for any acute changes in exam.  - Pain: fentanyl gtt. Scheduled tylenol. PRN tylenol, oxycodone, dilaudid.  - Sedation: propofol gtt, RASS -1 to -2  - Duloxetine 30mg  - Holding PTA meds: hydroxyzine 25mg PRN, zolpidem 5mg, melatonin 3mg, lorazepam 0.5mg  - Dental implant dislodged, consult     Pulmonary:   #Acute hypoxic respiratory failure on iMV  #Mild-moderate emphysema  #History of COVID-19  - Off nitric 7/12  - Mechanical ventilation, wean  - Repeat PST w/ Precedex     Cardiovascular:    #S/p LVAD placement (HeartMate 3) on 7/8/22   #S/p RVAD (29F Protek duo RIJ) on 7/8/22  #S/p chest closure 7/11  #Cardiogenic shock  #Advanced ischemic cardiomyopathy, class IV, stage D  #CAD s/p PCI to LAD (2005)  #S/p CRT-D (2006)  #History of MI (2007)  #Severe MR  #Antiphospholipid antibody syndrome on chronic warfarin  #HTN, HLD  #Recent hospitalization 5/16-5/26 s/p RCA, LAD stenting  #Atrial fibrillation   - Monitor hemodynamic status. Chest closure and oxygenator splicing 7/11. Reopened 7/12 for I&D and left open, plan for closure 7/13.  - Goal MAP > 65   - PTA meds: atorvastatin 40mg, clopidogrel 75mg, lasix 40mg, warfarin 5mg  - LVAD management per [facility] service   - flows stable, circuit functioning w/o clots/fibrin  - AC Plan: straight rate 400U/hr   - Hydralazine 10gm q8h  - Hydralazine prn MAP >90  - Discontinued amiodarone no longer in afib      GI / Nutrition:    #GERD  #Congestive hepatopathy in the setting of cardiac insuffiency  - NPO 2/2 intubation   - Titrate NJT to goal  - Bowel regimen (miralax / senna)  - Nutrition consulted.  - Trend LFT's    Hematemesis / oral mucosal bleedin/12 x2, 7/13 x1 (oral, around ETT/ETT clear).   - continue PPI BID x 72 hours (7/15 change after 12:00 noon)   - hgb stable   - OG without patience bloody output     Renal/ Fluid Balance:    - Strict I/O, daily weights   - Avoid/limit nephrotoxins as able  - Replete lytes PRN per protocol  - PTA meds: tamsulosin 0.4mg (held)  - Fluid goal net even      Endocrine:    #Stress induced hyperglycemia  Preop A1c 5.5%  - Continue Insulin gtt perioperatively   - Goal BG <180 for optimal healing     ID/ Antibiotics:  #Periopearive antibiosis (s/p course of Cefepime, vancomycin, rifampin, fluconazole)  - S/P Cefepime and Vancomycin (-), perioperative antibiotics    - Trend fever curve   - Pan culture (): Bcx x2, UA/UC -> negative    - Positive  Endotracheal sputum culture (4+ candida albicans, 1+ E.Coli, 3+ enterococcus faecalis)  - Zosyn, 7 day course (7/15 - ), pending sensitivities     Heme:     #Acute blood loss anemia  #Acute blood loss thrombocytopenia  #History of DVT and PE   #Antiphospholipid antibody syndrome  #History of iron deficiency anemia  - Monitor for s/sx of bleeding  - Trend CBC and coags.  - Hgb goal > 8  - Plt goal > 20  - repeat hgb 2pm    Rheumatology:  #SLE  - Chronic, symptoms include arthritis, photosensitivity, fatigue, and skin manifestations  - PTA meds: hydroxychloroquine 200mg, hold 1 week until post repeat chest closure     Prophylaxis:    - DVT prophylaxis: SCD only   - PPI  - Bowel regimen  - PT/OT    Lines / Tubes / Drains:  - R brachial triple lumen PICC  - L upper arm PIV  - Left radial A-line       Disposition:  - CV ICU.        ====================================================    Patient seen, findings and plan discussed with CV ICU  staff, Dr. Samano.      Rashard Stephenson MD  Anesthesiology, PGY3    ====================================================        SUBJECTIVE  - No acute events overnight  - Patient intubated and sedated  - PST unsuccessful yesterday 2/2 tachypnea and agitation   - 1u pRBC  - Afib since evening 7/14 -> SR in AM     OBJECTIVE    1. VITAL SIGNS    Temp:  [98.6  F (37  C)-100.2  F (37.9  C)] 98.8  F (37.1  C)  Pulse:  [] 123  Resp:  [5-44] 30  BP: (123)/(89) 123/89  MAP:  [63 mmHg-104 mmHg] 77 mmHg  Arterial Line BP: ()/() 83/71  FiO2 (%):  [30 %-35 %] 35 %  SpO2:  [83 %-100 %] 99 %  Vent Mode: CMV/AC  (Continuous Mandatory Ventilation/ Assist Control)  FiO2 (%): 35 %  Resp Rate (Set): 12 breaths/min  Tidal Volume (Set, mL): 450 mL  PEEP (cm H2O): 8 cmH2O  Pressure Support (cm H2O): 7 cmH2O  Resp: 30        2. INTAKE/ OUTPUT    I/O last 3 completed shifts:  In: 3773.24 [I.V.:1688.24; NG/GT:585]  Out: 2061 [Urine:974; Emesis/NG output:280; Chest Tube:807]      3. PHYSICAL EXAMINATION    General: sedated  Neuro: RAAS -2   Resp: intubated  CV: SR ~100s  Abdomen: Soft, Non-distended  Skin/Incisions: chest closed, right internal jugular cannulation, left femoral CVC/arterial line.  Extremities: warm and well perfused. LUE ecchymosis, unchanged      4. INVESTIGATIONS    Arterial Blood Gases   Recent Labs   Lab 07/15/22  0633 07/15/22  0346 07/14/22  2357 07/14/22  1950   PH 7.45 7.42 7.43 7.46*   PCO2 31* 34* 33* 30*   PO2 313* 127* 120* 113*   HCO3 21 22 22 21     Complete Blood Count   Recent Labs   Lab 07/15/22  1027 07/15/22  0345 07/14/22 2149 07/14/22  1950   WBC 21.5* 17.8* 17.6* 16.6*   HGB 7.2* 7.7* 7.3* 7.3*   * 120* 130* 110*     Basic Metabolic Panel  Recent Labs   Lab 07/15/22  1050 07/15/22  1027 07/15/22  0850 07/15/22  0637 07/15/22  0352 07/15/22  0345 07/14/22 2154 07/14/22 2149 07/14/22 2000 07/14/22  1950   NA  --  137  --   --   --  135*  --  133*  --  135*   POTASSIUM  --  3.9   --   --   --  4.0  --  4.1  --  4.2   CHLORIDE  --  110*  --   --   --  107  --  104  --  106   CO2  --  18*  --   --   --  19*  --  17*  --  19*   BUN  --  24.7*  --   --   --  26.3*  --  27.6*  --  28.6*   CR  --  0.51*  --   --   --  0.58*  --  0.57*  --  0.58*   * 140* 158* 127*   < > 119*   < > 150*   < > 145*    < > = values in this interval not displayed.     Liver Function Tests  Recent Labs   Lab 07/15/22  1027 07/15/22  0345 07/14/22  2149 07/14/22  1528 07/14/22  0953 07/14/22  0337 07/13/22  0948 07/13/22  0354 07/12/22  1007 07/12/22  0349 07/09/22  0408 07/08/22  2213 07/08/22  1843 07/08/22  1643   AST  --   --   --   --   --   --   --   --   --   --   --  159*  --  134*   ALT  --  20  --   --   --  23  --  25  --  21   < > 29  --  25   ALKPHOS  --   --   --   --   --   --   --   --   --   --   --  59  --  51   BILITOTAL  --   --   --   --   --   --   --   --   --   --   --  1.0  --  1.0   ALBUMIN  --  2.3*  --   --   --  2.3*  --  2.9*  --  2.3*   < > 2.5*  --  2.3*   INR 1.16* 1.23* 1.24* 1.20*   < > 1.21*   < > 1.18*   < > 1.26*   < > 1.45*   < > 1.87*    < > = values in this interval not displayed.     Pancreatic Enzymes  No lab results found in last 7 days.  Coagulation Profile  Recent Labs   Lab 07/15/22  1027 07/15/22  0345 07/15/22  0020 07/14/22 2149 07/14/22  1528   INR 1.16* 1.23*  --  1.24* 1.20*   PTT 58* 53* 61*  --  47*         5. RADIOLOGY    Recent Results (from the past 24 hour(s))   XR Feeding Tube Placement    Narrative    FEEDING TUBE PLACEMENT 7/11/2022 2:00 PM    INDICATION: Nutritional needs, open chest     COMPARISON: None.    FLUOROSCOPY TIME: 2.05 minutes    FINDINGS: The feeding tube was advanced under fluoroscopic guidance  with final position of tip in the second portion of the duodenum. A  small amount of barium was injected to demonstrate placement within  the small bowel. The tube was flushed with sterile water and secured  via nasal bridle. There were no  complications of the procedure.   Partially visualized chest tubes/drains and IVC filter.      Impression    IMPRESSION: Uncomplicated feeding tube placement with tip in the  second portion of the duodenum.    I, JOSE M OSHEA MD, attest that I was present for all critical  portions of the procedure and was immediately available to provide  guidance and assistance during the remainder of the procedure.    I have personally reviewed the examination and initial interpretation  and I agree with the findings.    JOSE M OSHEA MD         SYSTEM ID:  [id number]   Cardiac Device Check - Inpatient   Result Value    Date Time Interrogation Session [id number]    Implantable Pulse Generator  Medtronic    Implantable Pulse Generator Model [id number] Evera XT DR    Implantable Pulse Generator Serial Number [id number]    Type Interrogation Session In Clinic    Clinic Name [facility]    Implantable Pulse Generator Type Defibrillator    Implantable Pulse Generator Implant Date 20180412    Implantable Lead  Medtronic    Implantable Lead Model 5076 CapSureFix Novus    Implantable Lead Serial Number [id number]    Implantable Lead Implant Date 20060306    Implantable Lead Polarity Type Bipolar Lead    Implantable Lead Location Detail 1 UNKNOWN    Implantable Lead Location Right Atrium    Implantable Lead  Medtronic    Implantable Lead Model 6949 Sprint [place]    Implantable Lead Serial Number [id number]    Implantable Lead Implant Date 20060306    Implantable Lead Polarity Type Bipolar Lead    Implantable Lead Location Detail 1 UNKNOWN    Implantable Lead Location Right Ventricle    Marcos Setting Mode (NBG Code) MVP_AAI_DDD    Marcos Setting Lower Rate Limit 50    Marcos Setting Maximum Tracking Rate 130    Marcos Setting Maximum Sensor Rate 115    Marcos Setting Hysterisis Rate DISABLED    Marcos Setting TRISTEN Delay Low 350    Marcos Setting PAV Delay Low 350    Marcos Setting AT Mode  Switch Rate 171    Lead Channel Setting Sensing Polarity Bipolar    Lead Channel Setting Sensing Anode Location Right Atrium    Lead Channel Setting Sensing Anode Terminal Ring    Lead Channel Setting Sensing Cathode Location Right Atrium    Lead Channel Setting Sensing Cathode Terminal Tip    Lead Channel Setting Sensing Sensitivity 0.3    Lead Channel Setting Sensing Polarity Bipolar    Lead Channel Setting Sensing Anode Location Right Ventricle    Lead Channel Setting Sensing Anode Terminal Ring    Lead Channel Setting Sensing Cathode Location Right Ventricle    Lead Channel Setting Sensing Cathode Terminal Tip    Lead Channel Setting Sensing Sensitivity 0.3    Lead Channel Setting Pacing Polarity Bipolar    Lead Channel Setting Pacing Anode Location Right Atrium    Lead Channel Setting Pacing Anode Terminal Ring    Lead Channel Setting Sensing Cathode Location Right Atrium    Lead Channel Setting Sensing Cathode Terminal Tip    Lead Channel Setting Pacing Pulse Width 0.4    Lead Channel Setting Pacing Amplitude 1.5    Lead Channel Setting Pacing Capture Mode Adaptive    Lead Channel Setting Pacing Polarity Bipolar    Lead Channel Setting Pacing Anode Location Right Ventricle    Lead Channel Setting Pacing Anode Terminal Ring    Lead Channel Setting Sensing Cathode Location Right Ventricle    Lead Channel Setting Sensing Cathode Terminal Tip    Lead Channel Setting Pacing Pulse Width 1    Lead Channel Setting Pacing Amplitude 5    Lead Channel Setting Pacing Capture Mode Adaptive    Zone Setting Type Category VF    Zone Setting Detection Interval 320    Zone Setting Detection Beats Numerator 30    Zone Setting Detection Beats Denominator 40    Zone Setting Type Category VT    Zone Setting Detection Interval 280    Zone Setting Type Category VT    Zone Setting Detection Interval 350    Zone Setting Type Category VT    Zone Setting Detection Interval 400    Zone Setting Type Category ATRIAL_FIBRILLATION    Zone  Setting Type Category AT/AF    Zone Setting Detection Interval 350    Lead Channel Impedance Value 304    Lead Channel Sensing Intrinsic Amplitude 0.875    Lead Channel Sensing Intrinsic Amplitude 1.5    Lead Channel Pacing Threshold Amplitude 0.75    Lead Channel Pacing Threshold Pulse Width 0.4    Lead Channel Impedance Value 703    Lead Channel Impedance Value 608    Lead Channel Sensing Intrinsic Amplitude 0.875    Lead Channel Sensing Intrinsic Amplitude 1.125    Lead Channel Pacing Threshold Amplitude 2.5    Lead Channel Pacing Threshold Pulse Width 0.4    Battery Date Time of Measurements 20220711160745    Battery Status OK    Battery RRT Trigger 2.727    Battery Remaining Longevity 63    Battery Voltage 2.96    Capacitor Charge Type Reformation    Capacitor Last Charge Date Time 20220528111827    Capacitor Charge Time 3.973    Capacitor Charge Energy 18    Marcos Statistic Date Time Start 20220708170240    Marcos Statistic Date Time End 20220711160616    Marcos Statistic RA Percent Paced 0.1    Marcos Statistic RV Percent Paced 0.1    Marcos Statistic AP  Percent 0.02    Marcos Statistic AS  Percent 0.08    Marcos Statistic AP VS Percent 0.08    Marcos Statistic AS VS Percent 99.82    Atrial Tachy Statistic Date Time Start 20220708170240    Atrial Tachy Statistic Date Time End 20220711160616    Atrial Tachy Statistic AT/AF [place] Percent 3.1    Therapy Statistic Recent Shocks Delivered 0    Therapy Statistic Recent Shocks Aborted 0    Therapy Statistic Recent ATP Delivered 0    Therapy Statistic Recent Date Time Start 20220708170240    Therapy Statistic Recent Date Time End 20220711160616    Therapy Statistic Total Shocks Delivered 1    Therapy Statistic Total Shocks Aborted 0    Therapy Statistic Total ATP Delivered 0    Therapy Statistic Total  Date Time Start 62843575091921    Therapy Statistic Total  Date Time End 20220711160616    Episode Statistic Recent Count 28    Episode Statistic Type Category AT/AF     Episode Statistic Recent Count 0    Episode Statistic Type Category SVT    Episode Statistic Recent Count 0    Episode Statistic Type Category VT    Episode Statistic Recent Count 0    Episode Statistic Type Category VF    Episode Statistic Recent Count 0    Episode Statistic Type Category VT    Episode Statistic Recent Count 0    Episode Statistic Type Category VT    Episode Statistic Recent Count 0    Episode Statistic Type Category VT    Episode Statistic Recent Date Time Start 22406617167432    Episode Statistic Recent Date Time End 09155432527934    Episode Statistic Recent Date Time Start 59715829709611    Episode Statistic Recent Date Time End 81624024688508    Episode Statistic Recent Date Time Start 44343802051618    Episode Statistic Recent Date Time End 88826865776684    Episode Statistic Recent Date Time Start 69717642117991    Episode Statistic Recent Date Time End 69309965743530    Episode Statistic Recent Date Time Start 94331167784119    Episode Statistic Recent Date Time End 75474726761858    Episode Statistic Recent Date Time Start 40097345199775    Episode Statistic Recent Date Time End 82830918423773    Episode Statistic Recent Date Time Start 47104068780349    Episode Statistic Recent Date Time End 69193138052333    Episode Statistic Total Count 33    Episode Statistic Type Category AT/AF    Episode Statistic Total Count 0    Episode Statistic Type Category SVT    Episode Statistic Total Count 9    Episode Statistic Type Category VT    Episode Statistic Total Count 1    Episode Statistic Type Category VF    Episode Statistic Total Count 0    Episode Statistic Type Category VT    Episode Statistic Total Count 0    Episode Statistic Type Category VT    Episode Statistic Total Count 1    Episode Statistic Type Category VT    Episode Statistic Total Date Time Start 78053537386736    Episode Statistic Total Date Time End 80080737967961    Episode Statistic Total Date Time Start 83205206168444     Episode Statistic Total Date Time End 32891392775260    Episode Statistic Total Date Time Start 62636586042782    Episode Statistic Total Date Time End 74054348594631    Episode Statistic Total Date Time Start 01726370221205    Episode Statistic Total Date Time End 93173269954372    Episode Statistic Total Date Time Start 64367943382920    Episode Statistic Total Date Time End 32705963518727    Episode Statistic Total Date Time Start 93292165826968    Episode Statistic Total Date Time End 60131843745479    Episode Statistic Total Date Time Start 11343585448438    Episode Statistic Total Date Time End 32746543904423    Episode Identifier 56    Episode Type Category Monitor    Episode Date Time [id number]    Episode Duration 61    Episode Identifier 55    Episode Type Category Monitor    Episode Date Time [id number]    Episode Duration 144    Episode Identifier 54    Episode Type Category Monitor    Episode Date Time [id number]    Episode Duration 93    Episode Identifier 53    Episode Type Category Monitor    Episode Date Time 03048079178019    Episode Duration 214    Episode Identifier 52    Episode Type Category Monitor    Episode Date Time [id number]    Episode Duration 309    Episode Identifier 51    Episode Type Category Monitor    Episode Date Time [id number]    Episode Duration 316    Episode Identifier 50    Episode Type Category Monitor    Episode Date Time [id number]    Episode Duration 180    Episode Identifier 49    Episode Type Category Monitor    Episode Date Time [id number]    Episode Duration 597    Episode Identifier 48    Episode Type Category Monitor    Episode Date Time 80316863417640    Episode Duration 102    Episode Identifier 47    Episode Type Category Monitor    Episode Date Time [id number]    Episode Duration 970    Episode Identifier 46    Episode Type Category Monitor    Episode Date Time [id number]    Episode Duration 350    Episode Identifier 45     Episode Type Category Monitor    Episode Date Time 16751541391212    Episode Duration 351    Episode Identifier 44    Episode Type Category Monitor    Episode Date Time [id number]    Episode Duration 102    Episode Identifier 43    Episode Type Category Monitor    Episode Date Time [id number]    Episode Duration 353    Episode Identifier 42    Episode Type Category Monitor    Episode Date Time [id number]    Episode Duration 328    Episode Identifier 41    Episode Type Category Monitor    Episode Date Time [id number]    Episode Duration 152    Episode Identifier 40    Episode Type Category Monitor    Episode Date Time [id number]    Episode Duration 235    Episode Identifier 39    Episode Type Category Monitor    Episode Date Time [id number]    Episode Duration 296    Episode Identifier 38    Episode Type Category Monitor    Episode Date Time [id number]    Episode Duration 203    Episode Identifier 37    Episode Type Category Monitor    Episode Date Time [id number]    Episode Duration 182    Episode Identifier 36    Episode Type Category Monitor    Episode Date Time [id number]    Episode Duration 361    Episode Identifier 35    Episode Type Category Monitor    Episode Date Time [id number]    Episode Duration 297    Episode Identifier 34    Episode Type Category Monitor    Episode Date Time [id number]    Episode Duration 99    Episode Identifier 33    Episode Type Category Monitor    Episode Date Time [id number]    Episode Duration 228    Episode Identifier 32    Episode Type Category Monitor    Episode Date Time [id number]    Episode Duration 278    Episode Identifier 31    Episode Type Category Monitor    Episode Date Time [id number]    Episode Duration 186    Episode Identifier 30    Episode Type Category Monitor    Episode Date Time [id number]    Episode Duration 378    Episode Identifier 29    Episode Type Category Monitor    Episode Date Time  [id number]    Episode Duration 322    Narrative    Patient seen in OR #27 pre-operatively for evaluation and iterative programming of their ICD per MD orders.     Device: Medtronic [id number] Evera XT   Normal device function  Mode: AAI>DDD  bpm  AP: <0.1%  : <0.1%  Intrinsic rhythm:  bpm  Thoracic Impedance: Below the reference line suggesting possible intrathoracic fluid accumulation.  Short V-V intervals: 0  Lead Trends Appear Stable: yes  Estimated battery longevity to RRT = 5.2 years  Atrial Arrhythmia: 28 AT/AF episodes recorded - 1 min 1 sec - 16 min 10 sec.  AF [place]: 3.1%  Anticoagulant:   Ventricular Arrhythmia: 0  Setting Changes: VY detection, FVT detection and VT monitor zones programmed off.     TRINA Amador RN    Dual lead ICD    I have reviewed and interpreted the device interrogation, settings, programming and nurse's summary. The device is functioning within normal device parameters. I agree with the current findings, assessment and plan.   XR Chest Port 1 View    Narrative    EXAM: TEMPORARY  7/11/2022 5:37 PM     HISTORY:  Chest closure, intraoperative       COMPARISON:  7/11/2022    FINDINGS  Technique: Semiupright AP view of the chest.     Devices: Single view of the chest. The endotracheal tube tip projects  over the midthoracic trachea. Gastric tube passes below the left  hemidiaphragm. Stable LVAD and left chest wall cardiac device with  atrial and ventricular leads. Right upper extremity PICC tip is  obscured in the region of the high SVC. Stable positioning of right IJ  RVAD with tip in the pulmonary artery. Mediastinal drains and left  basilar chest tube. Chest is open with surgical sponges projected over  the mediastinum.    Unchanged cardiomegaly. Small bilateral pleural effusions. No  discernible pneumothorax.       Impression    IMPRESSION:   No unexpected retained foreign body. Remaining support devices stable  in position.    Results reported to LITA Camp, OR  circulator 7/11/2022 1749 hours    I have personally reviewed the examination and initial interpretation  and I agree with the findings.    KASI EAST MD         SYSTEM ID:  [id number]   XR Abdomen Port 1 View    Narrative    XR ABDOMEN PORT 1 VIEWS  7/11/2022 6:56 PM      HISTORY: post chest closure and verification of lines    COMPARISON: 7/11/2022, 7/3/2022    FINDINGS:   Single AP view of the abdomen. Stable thoracic findings. Stable IVC  filter. Feeding tube tip overlying the proximal jejunum with residual  enteric contrast over the stomach. Relative paucity of bowel gas. No  definite pneumatosis or portal venous gas.      Impression    IMPRESSION:   Feeding tube tip overlying the proximal jejunum.    I have personally reviewed the examination and initial interpretation  and I agree with the findings.    KASI EAST MD         SYSTEM ID:  [id number]   XR Chest Port 1 View    Narrative    EXAM: XR CHEST PORT 1 VIEW  7/12/2022 1:15 AM     HISTORY:  RVAD/LVAD/ETT       COMPARISON:  7/11/2022    FINDINGS: Single view of the chest. Postsurgical changes of the chest  with median sternotomy wires. Endotracheal tube tip projects over the  mid thoracic trachea. Feeding tube courses below the diaphragm and  beyond the field-of-view. The tip is at least to the body of the  stomach. Right upper extremity PICC tip is obscured in the region of  mid SVC. Right IJ RVAD with tip over the distal main pulmonary trunk.  Stable left chest wall cardiac device and LVAD. Stable mediastinal  drains and left basilar chest tube.     Stable enlarged cardiac silhouette. Small bilateral pleural effusions.  No appreciable pneumothorax. Similar perihilar and bibasilar  opacities.      Impression    IMPRESSION:   1. Stable support devices.  2. Stable small pleural effusions and bilateral pulmonary opacities.    I have personally reviewed the examination and initial interpretation  and I agree with the findings.    LAI HERNADEZ MD          SYSTEM ID:  [id number]   Cardiac Device Check - Inpatient   Result Value    Date Time Interrogation Session [id number]    Implantable Pulse Generator  Medtronic    Implantable Pulse Generator Model [id number] Evera XT DR    Implantable Pulse Generator Serial Number [id number]    Type Interrogation Session In Clinic    Clinic Name [facility]    Implantable Pulse Generator Type Defibrillator    Implantable Pulse Generator Implant Date 20180412    Implantable Lead  Medtronic    Implantable Lead Model 5076 CapSureFix Novus    Implantable Lead Serial Number [id number]    Implantable Lead Implant Date 20060306    Implantable Lead Polarity Type Bipolar Lead    Implantable Lead Location Detail 1 UNKNOWN    Implantable Lead Location Right Atrium    Implantable Lead  Medtronic    Implantable Lead Model 6949 Sprint [place]    Implantable Lead Serial Number [id number]    Implantable Lead Implant Date 20060306    Implantable Lead Polarity Type Bipolar Lead    Implantable Lead Location Detail 1 UNKNOWN    Implantable Lead Location Right Ventricle    Marcos Setting Mode (NBG Code) MVP_AAI_DDD    Marcos Setting Lower Rate Limit 50    Marcos Setting Maximum Tracking Rate 130    Marcos Setting Maximum Sensor Rate 115    Marcos Setting Hysterisis Rate DISABLED    Marcos Setting TRISTEN Delay Low 350    Marcos Setting PAV Delay Low 350    Marcos Setting AT Mode Switch Rate 171    Lead Channel Setting Sensing Polarity Bipolar    Lead Channel Setting Sensing Anode Location Right Atrium    Lead Channel Setting Sensing Anode Terminal Ring    Lead Channel Setting Sensing Cathode Location Right Atrium    Lead Channel Setting Sensing Cathode Terminal Tip    Lead Channel Setting Sensing Sensitivity 0.3    Lead Channel Setting Sensing Polarity Bipolar    Lead Channel Setting Sensing Anode Location Right Ventricle    Lead Channel Setting Sensing Anode Terminal Ring    Lead Channel Setting Sensing  Cathode Location Right Ventricle    Lead Channel Setting Sensing Cathode Terminal Tip    Lead Channel Setting Sensing Sensitivity 0.3    Lead Channel Setting Pacing Polarity Bipolar    Lead Channel Setting Pacing Anode Location Right Atrium    Lead Channel Setting Pacing Anode Terminal Ring    Lead Channel Setting Sensing Cathode Location Right Atrium    Lead Channel Setting Sensing Cathode Terminal Tip    Lead Channel Setting Pacing Pulse Width 0.4    Lead Channel Setting Pacing Amplitude 1.5    Lead Channel Setting Pacing Capture Mode Adaptive    Lead Channel Setting Pacing Polarity Bipolar    Lead Channel Setting Pacing Anode Location Right Ventricle    Lead Channel Setting Pacing Anode Terminal Ring    Lead Channel Setting Sensing Cathode Location Right Ventricle    Lead Channel Setting Sensing Cathode Terminal Tip    Lead Channel Setting Pacing Pulse Width 1    Lead Channel Setting Pacing Amplitude 5    Lead Channel Setting Pacing Capture Mode Adaptive    Zone Setting Type Category VF    Zone Setting Detection Interval 320    Zone Setting Detection Beats Numerator 30    Zone Setting Detection Beats Denominator 40    Zone Setting Type Category VT    Zone Setting Detection Interval 280    Zone Setting Type Category VT    Zone Setting Detection Interval 350    Zone Setting Type Category VT    Zone Setting Detection Interval 400    Zone Setting Type Category ATRIAL_FIBRILLATION    Zone Setting Type Category AT/AF    Zone Setting Detection Interval 350    Lead Channel Impedance Value 285    Lead Channel Sensing Intrinsic Amplitude 0.625    Lead Channel Pacing Threshold Amplitude 0.75    Lead Channel Pacing Threshold Pulse Width 0.4    Lead Channel Impedance Value 665    Lead Channel Impedance Value 551    Lead Channel Sensing Intrinsic Amplitude 0.75    Lead Channel Pacing Threshold Amplitude 2.75    Lead Channel Pacing Threshold Pulse Width 1    Battery Date Time of Measurements 80280264951935    Battery Status OK     Battery RRT Trigger 2.727    Battery Remaining Longevity 63    Battery Voltage 2.94    Capacitor Charge Type Reformation    Capacitor Last Charge Date Time 74953696523660    Capacitor Charge Time 3.973    Capacitor Charge Energy 18    Marcos Statistic Date Time Start 14694738326869    Marcos Statistic Date Time End 35340809865218    Marcos Statistic RA Percent Paced 0    Marcos Statistic RV Percent Paced 0    Marcos Statistic AP  Percent 0    Marcos Statistic AS  Percent 0    Marcos Statistic AP VS Percent 0    Marcos Statistic AS VS Percent 100    Atrial Tachy Statistic Date Time Start 92942943191393    Atrial Tachy Statistic Date Time End 52844893321174    Atrial Tachy Statistic AT/AF [place] Percent 0    Therapy Statistic Recent Shocks Delivered 0    Therapy Statistic Recent Shocks Aborted 0    Therapy Statistic Recent ATP Delivered 0    Therapy Statistic Recent Date Time Start 42867422886821    Therapy Statistic Recent Date Time End 58263962092617    Therapy Statistic Total Shocks Delivered 1    Therapy Statistic Total Shocks Aborted 0    Therapy Statistic Total ATP Delivered 0    Therapy Statistic Total  Date Time Start 25881664304944    Therapy Statistic Total  Date Time End 47823717836693    Episode Statistic Recent Count 0    Episode Statistic Type Category AT/AF    Episode Statistic Recent Count 0    Episode Statistic Type Category SVT    Episode Statistic Recent Count 0    Episode Statistic Type Category VT    Episode Statistic Recent Count 0    Episode Statistic Type Category VF    Episode Statistic Recent Count 0    Episode Statistic Type Category VT    Episode Statistic Recent Count 0    Episode Statistic Type Category VT    Episode Statistic Recent Count 0    Episode Statistic Type Category VT    Episode Statistic Recent Date Time Start 13977098310327    Episode Statistic Recent Date Time End 89283564064988    Episode Statistic Recent Date Time Start 35881676583333    Episode Statistic Recent Date Time  End 73681249437593    Episode Statistic Recent Date Time Start [id number]    Episode Statistic Recent Date Time End [id number]    Episode Statistic Recent Date Time Start [id number]    Episode Statistic Recent Date Time End [id number]    Episode Statistic Recent Date Time Start [id number]    Episode Statistic Recent Date Time End [id number]    Episode Statistic Recent Date Time Start [id number]    Episode Statistic Recent Date Time End [id number]    Episode Statistic Recent Date Time Start [id number]    Episode Statistic Recent Date Time End [id number]    Episode Statistic Total Count 33    Episode Statistic Type Category AT/AF    Episode Statistic Total Count 0    Episode Statistic Type Category SVT    Episode Statistic Total Count 9    Episode Statistic Type Category VT    Episode Statistic Total Count 1    Episode Statistic Type Category VF    Episode Statistic Total Count 0    Episode Statistic Type Category VT    Episode Statistic Total Count 0    Episode Statistic Type Category VT    Episode Statistic Total Count 1    Episode Statistic Type Category VT    Episode Statistic Total Date Time Start [id number]    Episode Statistic Total Date Time End [id number]    Episode Statistic Total Date Time Start [id number]    Episode Statistic Total Date Time End [id number]    Episode Statistic Total Date Time Start 03916171296056    Episode Statistic Total Date Time End [id number]    Episode Statistic Total Date Time Start 42917458602099    Episode Statistic Total Date Time End [id number]    Episode Statistic Total Date Time Start 84428235933002    Episode Statistic Total Date Time End 15665769151854    Episode Statistic Total Date Time Start 23710994447366    Episode Statistic Total Date Time End [id number]    Episode Statistic Total Date Time Start 12254709973413    Episode Statistic Total Date Time End [id number]    Narrative    Patient seen on [facility]  4E for evaluation and iterative programming of their ICD per MD orders, post-op chest washout and closure.     Device: DivvyHQ [id number] Evera XT   Normal device function. R Waves have drastically changed, today measuring 0.8 mV. RV threshold has also risen to 2.75 V @ 1 ms. RV impedance shows an increase in unipolar and unipolar.  Mode: AAI-DDD  bpm  AP: 0%  : 0%  Intrinsic Rhythm:  bpm  Short V-V intervals: 0  Lead Trends Appear Stable.   Estimated battery longevity to RRT = 5.2 years. Battery voltage = 2.95 V.   No Arrhythmias Recorded as Detection was ooff for surgery.  Setting Changes: ICD Tachy Detection and Therapies Turned ON.    Plan: Follow pt PRN while hospitalized  JESU Cueva RN    Dual lead ICD    I have reviewed and interpreted the device interrogation, settings, programming and nurse's summary. The device is functioning within normal device parameters. I agree with the current findings, assessment and plan.         6. LINES/DRAINS/AIRWAY    Peripheral IV 07/05/22 Left;Anterior Upper forearm (Active)   Site Assessment WDL 07/15/22 0800   Line Status Saline locked 07/15/22 0800   Dressing Intervention New dressing  07/12/22 0000   Phlebitis Scale 0-->no symptoms 07/15/22 0800   Infiltration Scale 0 07/15/22 0800   If infiltrated, was a vesicant infusing? No 07/11/22 1800   Number of days: 10       PICC Triple Lumen 06/17/22 Right Brachial vein medial (Active)   Site Assessment WDL 07/15/22 0800   Dressing Intervention Chlorhexidine patch;Transparent 07/15/22 0800   Dressing Change Due 07/17/22 07/15/22 0400   PICC Comment from OSH 06/17/22 1700   Osborne - Status infusing 07/15/22 0800   Osborne - Cap Change Due 07/19/22 07/15/22 0400   Red - Status infusing 07/15/22 0800   Red - Cap Change Due 07/19/22 07/15/22 0400   White - Status infusing 07/15/22 0800   White - Cap Change Due 07/19/22 07/15/22 0400   Extravasation? No 07/14/22 2000   Line Necessity Yes, meets criteria 07/15/22 0800    Number of days: 28       Introducer 07/08/22 Femoral Left (Active)   Specific Qualities Capped 07/15/22 0800   (Retired) Dressing Status Clean, dry, intact 07/15/22 0800   Dressing Intervention Dressing changed 07/10/22 0000   Dressing Change Due 07/17/22 07/15/22 0400   Number of days: 7       Arterial Line 07/14/22 Radial (Active)   Site Assessment WDL 07/15/22 0800   Line Status Pulsatile blood flow 07/15/22 0800   Arterine Line Cap Change Due 07/19/22 07/15/22 0400   Art Line Waveform Appropriate;Square wave test performed 07/15/22 0800   Art Line Interventions Leveled;Connections checked and tightened 07/15/22 0800   Color/Movement/Sensation Capillary refill less than 3 sec 07/15/22 0800   Line Necessity Yes, meets criteria 07/15/22 0800   Dressing Type Transparent 07/15/22 0800   Dressing Status Clean, dry, intact 07/15/22 0800   Dressing Change Due 07/17/22 07/15/22 0400   Number of days: 1       Venous Sheath 07/08/22 Other (comment) Right (Active)   Specific Qualities Sutured;Left in Place 07/15/22 0800   Site Assessment UTV 07/15/22 0800   Dressing Type Transparent 07/15/22 0800   Dressing Intervention Dressing reinforced 07/13/22 0400   Venous Sheath Comment Protek Duo 07/15/22 0800   Number of days: 7       CVC Double Lumen Left Femoral (Active)   Site Assessment WDL 07/15/22 0800   Dressing Type Chlorhexidine disk;Transparent 07/15/22 0800   Dressing Status clean;dry;intact 07/15/22 0800   Dressing Intervention dressing changed 07/10/22 0000   Dressing Change Due 07/17/22 07/15/22 0400   Line Necessity yes, meets criteria 07/15/22 0800   Brown - Status saline locked 07/15/22 0800   Brown - Cap Change Due 07/19/22 07/15/22 0400   White - Status saline locked 07/15/22 0800   White - Cap Change Due 07/19/22 07/15/22 0400   Phlebitis Scale 0-->no symptoms 07/15/22 0800   Infiltration? no 07/15/22 0800   Infiltration Scale 0 07/14/22 1600   CVC Comment MAC 07/15/22 0800   Number of days: 7       ETT Cuffed  Single 8 mm (Active)   Secured at (cm) 24 cm 07/15/22 0908   Measured from Teeth/Gums 07/15/22 0908   Position Left 07/15/22 0600   Secured by Commercial tube partida 07/15/22 0908   Bite Block None Present 07/15/22 0016   Site Appearance Clean;Dry 07/15/22 0406   Tube Care Site care done 07/15/22 0600   Cuff Assessment Minimal leak technique 07/14/22 1038   Safety Measures Manual resuscitator at bedside;Manual resuscitator/mask/valve in room;Manual resuscitator/PEEP valve in room 07/15/22 0908   Number of days: 7       Chest Tube 1 Anterior Mediastinal 9 [place] Island[place] (Active)   Site Assessment UTV 07/15/22 1200   Suction -20 cm H2O 07/15/22 1200   Chest Tube Airleak No 07/15/22 1200   Drainage Description Serosanguinous 07/15/22 1200   Dressing Status Normal: Clean, Dry & Intact 07/15/22 1200   Dressing Change Due 07/16/22 07/15/22 0400   Dressing Intervention Gauze 07/15/22 0000   Patency Intervention Tip/Tilt 07/15/22 1200   Chest Tube Clamps at Bedside present 07/15/22 1200   Container Amount 500 07/15/22 1200   Output (ml) 20 ml 07/15/22 1200   Number of days: 7       Chest Tube 2 Anterior Mediastinal 9 [place] (Active)   Site Assessment UTV 07/15/22 1200   Suction -20 cm H2O 07/15/22 1200   Chest Tube Airleak No 07/15/22 1200   Drainage Description Serosanguinous 07/15/22 1200   Dressing Status Normal: Clean, Dry & Intact 07/15/22 1200   Dressing Change Due 07/16/22 07/15/22 0400   Dressing Intervention Transparent 07/15/22 1200   Patency Intervention Tip/Tilt 07/15/22 1200   Chest Tube Clamps at Bedside present 07/15/22 1200   Number of days: 7       Chest Tube 3 Pleural 32 [place]er (Active)   Site Assessment UTV 07/15/22 1200   Suction -20 cm H2O 07/15/22 1200   Chest Tube Airleak No 07/15/22 1200   Drainage Description Serosanguinous 07/15/22 1200   Dressing Status Normal: Clean, Dry & Intact 07/15/22 1200   Dressing Change Due 07/16/22 07/15/22 0400   Dressing Intervention Gauze 07/15/22 0000   Patency Intervention  Tip/Tilt 07/15/22 1200   Chest Tube Clamps at Bedside present 07/15/22 1200   Container Amount 1740 07/15/22 1200   Output (ml) 40 ml 07/15/22 1200   Number of days: 7       Chest Tube 4 Posterior Pericardial 24 [place] (Active)   Site Assessment UTV 07/15/22 1200   Suction -20 cm H2O 07/15/22 1200   Chest Tube Airleak No 07/15/22 1200   Drainage Description Serosanguinous 07/15/22 1200   Dressing Status Normal: Clean, Dry & Intact 07/15/22 1200   Dressing Change Due 07/16/22 07/15/22 0400   Dressing Intervention Gauze 07/15/22 0000   Patency Intervention Tip/Tilt 07/15/22 1200   Chest Tube Clamps at Bedside present 07/15/22 1200   Number of days: 7       Negative Pressure Wound Therapy Sternum Upper (Active)   Wound Type Surgical 07/15/22 1200   Unit Type Vac Ulta 07/15/22 1200   Target Pressure (mmHg) 125 07/15/22 1200   Cannister changed? No 07/15/22 1200   Output (ml) 0 ml 07/15/22 1200   Number of days: 2       NG/OG/NJ Tube Nasojejunal Right nostril (Active)   Site Description WDL 07/15/22 1200   Status Enteral Feedings 07/15/22 1200   Placement Confirmation Anderson unchanged 07/15/22 1200   Anderson (cm marking) at nare/mouth 113 cm 07/15/22 1200   Intake (ml) 60 ml 07/15/22 0800   Flush/Free Water (mL) 30 mL 07/15/22 1200   Number of days: 4       NG/OG/NJ Tube Orogastric Right mouth (Active)   Site Description WDL 07/15/22 1200   Status Suction-low intermittent 07/15/22 1200   Drainage Appearance Bile 07/15/22 1200   Placement Confirmation Anderson unchanged 07/15/22 1200   Anderson (cm marking) at nare/mouth 64 cm 07/15/22 1200   Flush/Free Water (mL) 30 mL 07/15/22 0800   Container Amount 450 mL 07/15/22 1200   Output (ml) 50 ml 07/15/22 1200   Number of days: 3       Rectal Tube (Active)   Balloon fill volume  40 cc 07/15/22 1200   Stool Leakage Small 07/15/22 1200   Rectal Tube Container Volume 0 ml 07/15/22 0800   Rectal Tube Output 0 ml 07/15/22 0800   Number of days: 1       Urethral Catheter  07/08/22 Coude;Latex;Temperature probe;Triple-lumen 16 fr (Active)   Tube Description Positional 07/14/22 1600   Catheter Care Done;Catheter wipes 07/15/22 1200   Collection Container Standard 07/15/22 1200   Securement Method Securing device (Describe) 07/15/22 1200   Rationale for Continued Use Strict 1-2 Hour I&O 07/15/22 1200   Urine Output 150 mL 07/15/22 1300   Number of days: 7       Wound Face Pressure injury hospital acquired Stage 1 (Active)   Wound Bed Erythema, non-blanchable, skin intact 07/15/22 0000   Estimated Circumference ( if not measured quarter sized 07/14/22 1200   Drainage Amount None 07/15/22 0000   Dressing Foam 07/15/22 0000   Dressing Status Clean, dry, intact 07/15/22 0000   Number of days: 2       Incision/Surgical Site 06/23/22 Right Chest (Active)   Incision Assessment WDL 07/15/22 0000   Deirdre-Incision Assessment UTV 07/15/22 0000   Closure Liquid bandage;Approximated 07/13/22 0400   Incision Drainage Amount UTV 07/15/22 0000   Drainage Description UTV 07/15/22 0000   Incision Care Wound cleanser 07/13/22 0400   Dressing Intervention Clean, dry, intact 07/13/22 0400   Number of days: 22       Incision/Surgical Site 07/08/22 Midline Chest (Active)   Incision Assessment UTV 07/15/22 0000   Deirdre-Incision Assessment UTV 07/15/22 0000   Closure Other (Comment) 07/13/22 1600   Incision Drainage Amount Scant 07/11/22 1200   Drainage Description Sanguinous 07/11/22 0400   Incision Care Therapy - negative pressure 07/15/22 0000   Dressing Intervention Clean, dry, intact 07/15/22 0000   Number of days: 7       Incision/Surgical Site 07/13/22 Chest (Active)   Number of days: 2       Incision/Surgical Site 07/13/22 Bilateral Chest (Active)   Closure Sutures 07/13/22 1805   Number of days: 2       Incision/Surgical Site 07/13/22 Medial Sternum (Active)   Incision Assessment UTV 07/15/22 0000   Deirdre-Incision Assessment Pale 07/14/22 0400   Closure Other (Comment) 07/14/22 0400   Incision Drainage  Amount None 07/14/22 1600   Incision Care Therapy - negative pressure 07/15/22 0000   Dressing Intervention Clean, dry, intact 07/15/22 0000   Number of days: 2          ====================================================

## 2022-07-15 NOTE — PROGRESS NOTES
VAD Shift Summary: 1900-0700      VAD Equipment:  Console serial number: Primary [id number], secondary [id number]  Circuit Lot number: [id number]  Pump head lot number: [id number]       Patient remains on RVAD, all equipment is functioning and alarms are appropriately set. RPM's 4050 with flow range 3.4-3.42 L/min. . Circuit remains free of air and clot. Some fibrin noted at connectors. Cannulas are secure with no bleeding from site. Extremities are warm.     Significant Shift Events: none    Vent settings:  Vent Mode: CMV/AC  (Continuous Mandatory Ventilation/ Assist Control)  FiO2 (%): 30 %  Resp Rate (Set): 12 breaths/min  Tidal Volume (Set, mL): 450 mL  PEEP (cm H2O): (S) 8 cmH2O (desatting)  Pressure Support (cm H2O): 7 cmH2O  Resp: 23  .    Heparin is running 400 unit(s)/hr    Urine output is as charted per RN, blood loss was minimal. No blood products given.    Intake/Output Summary (Last 24 hours) at 7/15/2022 0648  Last data filed at 7/15/2022 0600  Gross per 24 hour   Intake 3773.24 ml   Output 2061 ml   Net 1712.24 ml       ECHO:  No results found for this or any previous visit.  No results found for this or any previous visit.      CXR:  Recent Results (from the past 24 hour(s))   XR Chest Port 1 View    Narrative    Chest one view portable spine    HISTORY: Endotracheal tube placement check position    COMPARISON STUDY: Same day at 0022    Findings: LVAD, left transvenous implantable cardiac defibrillator.  Right IJ ECMO cannula. Based on drain. Endotracheal tube tip in the  mid trachea. Feeding tube and enteric tube collimated off the film in  the abdomen. Right axillary arterial sheath. Right PICC with tip in  the mid SVC. Interstitial opacities bilaterally. Left basilar  atelectasis.      Impression    IMPRESSION: Interstitial opacities bilaterally compatible with  interstitial edema    GABRIEL COURTNEY MD         SYSTEM ID:  [id number]       Labs:  Recent Labs   Lab 07/15/22  0346 07/14/22  8466  07/14/22  1950 07/14/22  1611   PH 7.42 7.43 7.46* 7.47*   PCO2 34* 33* 30* 28*   PO2 127* 120* 113* 100   HCO3 22 22 21 21   O2PER 30 30 30 30       Lab Results   Component Value Date    HGB 7.7 (L) 07/15/2022    PHGB 70 (H) 07/15/2022     (L) 07/15/2022    FIBR 510 (H) 07/15/2022    INR 1.23 (H) 07/15/2022    PTT 53 (H) 07/15/2022    DD 2.34 (H) 07/15/2022    ANTCH 93 07/14/2022         Plan is to continue RVAD support.      Inna Marroquin, RT  ECMO Specialist  7/15/2022 6:48 AM

## 2022-07-15 NOTE — PLAN OF CARE
Major Shift Events:      Neuro/Pain: prop & fent infusing per MAR. Pt moved extremities equally. Pupils reactive. Left pupil slightly bigger than right. Team aware. Does not follow commands during sedation wean.   Cardiac: a.fib 's. No ectopy. MAPS 63-90. RVAD & LVAD numbers charted hourly. See flow sheets for data. No alarms overnight.   Respiratory: Vent settings charted in flow sheets. Biteblock needed to be replaced. During replacement RT noticed Front tooth almost falling out. RT did not feel comfortable placing new bite block in case of injury. CVTS came to bedside to assess & tooth was completely disconnected. Found to be an implant. Tooth saved in specimen cup & placed in room safe. New bite block in place. Pt vigorously chews on ETT when awake.   GI: rectal tube in place.leaked 1 time around tube Bowel regimen held.   : marcelo in place draining carolynn urine. Output 15-60 per hour.   Endocrine/Diet: insulin gtt infusing per MAR      approximately 0630 pt desatturated to the mid 80's. No response to FIO2 increase to 100%. Minimal secretions suctioned. CVTS & RT called to bedside. Peep increased to 8. Chest xray taken with little changes. ABG sent down. Pt now sating 100%. FIO2 weaning down. Currently at 50%       Plan: pressure support as able today.   For vital signs and complete assessments, please see documentation flowsheets.

## 2022-07-15 NOTE — PROGRESS NOTES
ECMO Shift Summary: [id number]      ECMO Equipment:  Console serial number: Primary [id number], secondary [id number]  Circuit Lot number: [id number]  Pump head lot number: [id number]       Patient remains on RVAD support, all equipment is functioning and alarms are appropriately set. RPM's 4050 with flow range 3.3-3.36 L/min. Circuit remains free of air and clot. Fibrin noted at connectors and in return tubing where SaO2/Hgb probe had been left in one spot for too long. Cannulas are secure with no bleeding from site. Extremities are warm and well perfused.  Significant Shift Events: None    Vent settings:  Vent Mode: CMV/AC  (Continuous Mandatory Ventilation/ Assist Control)  FiO2 (%): 35 %  Resp Rate (Set): 12 breaths/min  Tidal Volume (Set, mL): 450 mL  PEEP (cm H2O): 8 cmH2O  Pressure Support (cm H2O): 7 cmH2O  Resp: 25  .    Heparin is running at 1250 u/kg/hr    Urine output is as charted in I/O. No product given       Intake/Output Summary (Last 24 hours) at 7/15/2022 1722  Last data filed at 7/15/2022 1300  Gross per 24 hour   Intake 2686.52 ml   Output 1815 ml   Net 871.52 ml       ECHO:  No results found for this or any previous visit.  No results found for this or any previous visit.      CXR:  Recent Results (from the past 24 hour(s))   XR Chest Port 1 View    Narrative    Chest one view portable spine    HISTORY: Endotracheal tube placement check position    COMPARISON STUDY: Same day at 0022    Findings: LVAD, left transvenous implantable cardiac defibrillator.  Right IJ ECMO cannula. Based on drain. Endotracheal tube tip in the  mid trachea. Feeding tube and enteric tube collimated off the film in  the abdomen. Right axillary arterial sheath. Right PICC with tip in  the mid SVC. Interstitial opacities bilaterally. Left basilar  atelectasis.      Impression    IMPRESSION: Interstitial opacities bilaterally compatible with  interstitial edema    GABRIEL COURTNEY MD         SYSTEM ID:  [id number]   XR  Chest Port 1 View    Narrative    EXAM: XR CHEST PORT 1 VIEW  7/15/2022 1:44 AM     HISTORY:  RVAD/LVAD/ETT       COMPARISON:  7/14/2022    FINDINGS: Single view of the chest. Postsurgical changes of the chest  with intact median sternotomy wires. Endotracheal tube tip projects  over the low thoracic trachea. Esophageal temperature probe projects  over the proximal esophagus. Enteric tubes course below the diaphragm  and beyond the field-of-view. Right IJ RVAD tip projects over the main  pulmonary trunk. Right upper extremity PICC tip projects over the mid  SVC. Stable left chest wall cardiac device LVAD. Stable mediastinal  drains and left basilar chest tube.    Stable cardiomediastinal silhouette. Small pleural effusions. No  appreciable pneumothorax. Mildly improved diffuse bilateral hazy  opacities. Persistent bibasilar atelectasis.      Impression    IMPRESSION:   1. Mildly improved bilateral diffuse hazy opacities likely  representing pulmonary edema.  2. Stable support devices.    I have personally reviewed the examination and initial interpretation  and I agree with the findings.    LAI HERNADEZ MD         SYSTEM ID:  [id number]   XR Chest Port 1 View    Narrative    EXAM: XR CHEST PORT 1 VIEW  7/15/2022 6:29 AM     HISTORY:  Desaturation       COMPARISON:  Earlier same day chest radiograph    FINDINGS: Single view of the chest. Surgical changes of the chest with  intact median sternotomy wires. Endotracheal tube tip projects over  the low thoracic trachea. Enteric tubes course below the diaphragm and  beyond the field-of-view. The esophageal temperature probe projects at  the proximal esophagus. Right IJ RVAD tip projects over the main  pulmonary trunk. Right upper extremity PICC tip projects over the mid  SVC. Stable left chest wall cardiac device and LVAD. Stable  mediastinal drains and left chest tube.    Stable enlarged cardiac silhouette. Blunting of the costophrenic  angles. No appreciable  pneumothorax. Low lung volumes with similar  bilateral diffuse mixed interstitial and airspace opacities.      Impression    IMPRESSION:   1. No significant change in small pleural effusions with bilateral  diffuse pulmonary opacities.  2. Stable support devices.    I have personally reviewed the examination and initial interpretation  and I agree with the findings.    LAI HERNADEZ MD         SYSTEM ID:  [id number]   Cardiac Device Check - Inpatient   Result Value    Date Time Interrogation Session [id number]    Implantable Pulse Generator  Medtronic    Implantable Pulse Generator Model [id number] Evera XT DR    Implantable Pulse Generator Serial Number [id number]    Type Interrogation Session In Clinic    Clinic Name [facility]    Implantable Pulse Generator Type Defibrillator    Implantable Pulse Generator Implant Date 20180412    Implantable Lead  Medtronic    Implantable Lead Model 5076 CapSureFix Novus    Implantable Lead Serial Number [id number]    Implantable Lead Implant Date 20060306    Implantable Lead Polarity Type Bipolar Lead    Implantable Lead Location Detail 1 UNKNOWN    Implantable Lead Location Right Atrium    Implantable Lead  Medtronic    Implantable Lead Model 6949 Sprint Doron    Implantable Lead Serial Number [id number]    Implantable Lead Implant Date 20060306    Implantable Lead Polarity Type Bipolar Lead    Implantable Lead Location Detail 1 UNKNOWN    Implantable Lead Location Right Ventricle    Marcos Setting Mode (NBG Code) MVP_AAI_DDD    Marcos Setting Lower Rate Limit 50    Marcos Setting Maximum Tracking Rate 130    Marcos Setting Maximum Sensor Rate 115    Marcos Setting Hysterisis Rate DISABLED    Marcos Setting TRISTEN Delay Low 350    Marcos Setting PAV Delay Low 350    Marcos Setting AT Mode Switch Rate 171    Lead Channel Setting Sensing Polarity Bipolar    Lead Channel Setting Sensing Anode Location Right Atrium    Lead Channel  Setting Sensing Anode Terminal Ring    Lead Channel Setting Sensing Cathode Location Right Atrium    Lead Channel Setting Sensing Cathode Terminal Tip    Lead Channel Setting Sensing Sensitivity 0.3    Lead Channel Setting Sensing Polarity Bipolar    Lead Channel Setting Sensing Anode Location Right Ventricle    Lead Channel Setting Sensing Anode Terminal Ring    Lead Channel Setting Sensing Cathode Location Right Ventricle    Lead Channel Setting Sensing Cathode Terminal Tip    Lead Channel Setting Sensing Sensitivity 0.3    Lead Channel Setting Pacing Polarity Bipolar    Lead Channel Setting Pacing Anode Location Right Atrium    Lead Channel Setting Pacing Anode Terminal Ring    Lead Channel Setting Sensing Cathode Location Right Atrium    Lead Channel Setting Sensing Cathode Terminal Tip    Lead Channel Setting Pacing Pulse Width 0.4    Lead Channel Setting Pacing Amplitude 2    Lead Channel Setting Pacing Capture Mode Adaptive    Lead Channel Setting Pacing Polarity Bipolar    Lead Channel Setting Pacing Anode Location Right Ventricle    Lead Channel Setting Pacing Anode Terminal Ring    Lead Channel Setting Sensing Cathode Location Right Ventricle    Lead Channel Setting Sensing Cathode Terminal Tip    Lead Channel Setting Pacing Pulse Width 1    Lead Channel Setting Pacing Amplitude 5    Lead Channel Setting Pacing Capture Mode Adaptive    Zone Setting Type Category VF    Zone Setting Detection Interval 320    Zone Setting Detection Beats Numerator 30    Zone Setting Detection Beats Denominator 40    Zone Setting Type Category VT    Zone Setting Detection Interval 280    Zone Setting Type Category VT    Zone Setting Detection Interval 350    Zone Setting Type Category VT    Zone Setting Detection Interval 400    Zone Setting Type Category ATRIAL_FIBRILLATION    Zone Setting Type Category AT/AF    Zone Setting Detection Interval 350    Lead Channel Impedance Value 304    Lead Channel Sensing Intrinsic Amplitude  0.6    Lead Channel Pacing Threshold Amplitude 0.5    Lead Channel Pacing Threshold Pulse Width 0.4    Lead Channel Impedance Value 475    Lead Channel Impedance Value 418    Lead Channel Sensing Intrinsic Amplitude 0.6    Lead Channel Pacing Threshold Amplitude 4    Lead Channel Pacing Threshold Pulse Width 1    Battery Date Time of Measurements 20220715101154    Battery Status OK    Battery RRT Trigger 2.727    Battery Remaining Longevity 59    Battery Voltage 2.93    Capacitor Charge Type Reformation    Capacitor Last Charge Date Time 72450794967745    Capacitor Charge Time 3.973    Capacitor Charge Energy 18    Marcos Statistic Date Time Start 20220713074648    Marcos Statistic Date Time End 20220715100938    Marcos Statistic RA Percent Paced 0    Marcos Statistic RV Percent Paced 0    Marcos Statistic AP  Percent 0    Marcos Statistic AS  Percent 0    Marcos Statistic AP VS Percent 0    Marcos Statistic AS VS Percent 100    Atrial Tachy Statistic Date Time Start 37825165318159    Atrial Tachy Statistic Date Time End 20220715100938    Atrial Tachy Statistic AT/AF [place] Percent 0    Therapy Statistic Recent Shocks Delivered 0    Therapy Statistic Recent Shocks Aborted 0    Therapy Statistic Recent ATP Delivered 0    Therapy Statistic Recent Date Time Start 21703744721181    Therapy Statistic Recent Date Time End 20220715100938    Therapy Statistic Total Shocks Delivered 1    Therapy Statistic Total Shocks Aborted 0    Therapy Statistic Total ATP Delivered 0    Therapy Statistic Total  Date Time Start 10204706389461    Therapy Statistic Total  Date Time End 20220715100938    Episode Statistic Recent Count 0    Episode Statistic Type Category AT/AF    Episode Statistic Recent Count 0    Episode Statistic Type Category SVT    Episode Statistic Recent Count 0    Episode Statistic Type Category VT    Episode Statistic Recent Count 0    Episode Statistic Type Category VF    Episode Statistic Recent Count 0    Episode  Statistic Type Category VT    Episode Statistic Recent Count 0    Episode Statistic Type Category VT    Episode Statistic Recent Count 0    Episode Statistic Type Category VT    Episode Statistic Recent Date Time Start 54437789215141    Episode Statistic Recent Date Time End 83644102235792    Episode Statistic Recent Date Time Start 14138259505731    Episode Statistic Recent Date Time End 32784820744403    Episode Statistic Recent Date Time Start 63979105831889    Episode Statistic Recent Date Time End 13641165214031    Episode Statistic Recent Date Time Start 02957844696926    Episode Statistic Recent Date Time End 74880684566592    Episode Statistic Recent Date Time Start 82332833905774    Episode Statistic Recent Date Time End 09396685830477    Episode Statistic Recent Date Time Start 43738637354007    Episode Statistic Recent Date Time End 36413985331233    Episode Statistic Recent Date Time Start 73241278356916    Episode Statistic Recent Date Time End 51244117416311    Episode Statistic Total Count 33    Episode Statistic Type Category AT/AF    Episode Statistic Total Count 0    Episode Statistic Type Category SVT    Episode Statistic Total Count 9    Episode Statistic Type Category VT    Episode Statistic Total Count 1    Episode Statistic Type Category VF    Episode Statistic Total Count 0    Episode Statistic Type Category VT    Episode Statistic Total Count 0    Episode Statistic Type Category VT    Episode Statistic Total Count 1    Episode Statistic Type Category VT    Episode Statistic Total Date Time Start 28924640376108    Episode Statistic Total Date Time End 51686621914280    Episode Statistic Total Date Time Start 86508127996914    Episode Statistic Total Date Time End 90291806878178    Episode Statistic Total Date Time Start 03688149518236    Episode Statistic Total Date Time End 67255734668798    Episode Statistic Total Date Time Start 31307003376551    Episode Statistic Total Date Time End  20220715100938    Episode Statistic Total Date Time Start 20180412151624    Episode Statistic Total Date Time End 20220715100938    Episode Statistic Total Date Time Start 20180412151624    Episode Statistic Total Date Time End 20220715100938    Episode Statistic Total Date Time Start 20180412151624    Episode Statistic Total Date Time End 20220715100938    Narrative    Patient seen in [address] [place] [address] for evaluation and iterative programming of their ICD per MD orders for arrhythmia assessment.    Device: Medtronic [id number] Evera XT   Normal Device Function.  Mode: AAI<=>DDD /130 bpm  AP: <0.1%  : 0%  Intrinsic rhythm: ST @ 109 bpm  Short V-V intervals: 0  Thoracic Impedance: Below reference line, suggesting possible fluid accumulation.  Lead Trends Appear Stable: RV lead impedance stable. R-wave amplitude remains small, measuring at 0.6 mV today. RV capture threshold continues to climb, measuring at 4.00 V @ 1.0 ms today. Cardiology team at bedside during interrogation and notified of these findings.  Estimated battery longevity to RRT = 4.9 years. Battery voltage = 2.94 V.  Atrial arrhythmia: None  AF burden: 0%  Anticoagulant: Heparin gtt  Ventricular Arrhythmia: None  Setting changes: None    Plan: Continue inpatient evaluation and treatment.  RICKIE Mcpherson RN    Dual lead ICD    I have reviewed and interpreted the device interrogation, settings, programming and nurse's summary. The device is functioning within normal device parameters. I agree with the current findings, assessment and plan.       Labs:  Recent Labs   Lab 07/15/22  1549 07/15/22  0633 07/15/22  0346 07/14/22  2357   PH 7.46* 7.45 7.42 7.43   PCO2 30* 31* 34* 33*   PO2 126* 313* 127* 120*   HCO3 21 21 22 22   O2PER 35 100 30 30       Lab Results   Component Value Date    HGB 7.9 (L) 07/15/2022    PHGB 70 (H) 07/15/2022     07/15/2022    FIBR 528 (H) 07/15/2022    INR 1.23 (H) 07/15/2022     (HH) 07/15/2022    DD 2.58 (H) 07/15/2022     ELIDACH 91 07/15/2022       Plan is to remain on RVAD for support.      Julianna Leos, RRT-NPS  ECMO Specialist  7/15/2022 5:22 PM

## 2022-07-15 NOTE — PROGRESS NOTES
VAD Social Work Services Progress Note      Date of Initial Social Work Evaluation: 5/18/2022 with admission assessment completed on 6/20/2022  Collaborated with: Maricel    Data: Mr. Sands was listed status 2 for a little while, but ended up emergently receiving an LVAD instead. He remains in ICU, intubated with both LVAD and RVAD. Dtr has returned to [place] and Son here staying at a hotel in [place]. Dtr about to have her Baby this weekend/Monday and hoping to be back down here at the end of next week. She was asking about the apartment and where they are on the list. Patient currently #2.  Intervention: Talked with Asha over the phone. Informed her of movement within the [place] [facility] list and that he has move up on the list and that there may be one opening within the next week or so.   Provided emotional support as she expressed feeling worried about her Dad.  Assessment: Son and Dtr continue [place]. He is currently intubated with both LVAD and RVAD and considered critical. Dtr reports having a hard time not being here.  Education provided by SW: Ongoing SW availability  Plan:    Discharge Plans in Progress: TBD    Barriers to d/c plan: Medical condition    Follow up Plan: SW will continue to follow for ongoing psychosocial support post-VAD.

## 2022-07-16 ENCOUNTER — APPOINTMENT (OUTPATIENT)
Dept: GENERAL RADIOLOGY | Facility: CLINIC | Age: 62
DRG: 001 | End: 2022-07-16
Attending: SURGERY
Payer: COMMERCIAL

## 2022-07-16 LAB
ALBUMIN SERPL BCG-MCNC: 2.4 G/DL (ref 3.5–5.2)
ALT SERPL W P-5'-P-CCNC: 17 U/L (ref 10–50)
ANION GAP SERPL CALCULATED.3IONS-SCNC: 10 MMOL/L (ref 7–15)
ANION GAP SERPL CALCULATED.3IONS-SCNC: 10 MMOL/L (ref 7–15)
ANION GAP SERPL CALCULATED.3IONS-SCNC: 11 MMOL/L (ref 7–15)
ANION GAP SERPL CALCULATED.3IONS-SCNC: 8 MMOL/L (ref 7–15)
ANION GAP SERPL CALCULATED.3IONS-SCNC: 9 MMOL/L (ref 7–15)
APTT PPP: 120 SECONDS (ref 22–38)
APTT PPP: 127 SECONDS (ref 22–38)
APTT PPP: 132 SECONDS (ref 22–38)
APTT PPP: 143 SECONDS (ref 22–38)
APTT PPP: 164 SECONDS (ref 22–38)
AT III ACT/NOR PPP CHRO: 93 % (ref 85–135)
BACTERIA BLD CULT: NO GROWTH
BACTERIA SPT CULT: ABNORMAL
BASE EXCESS BLDA CALC-SCNC: -0.9 MMOL/L (ref -9–1.8)
BASE EXCESS BLDA CALC-SCNC: -1 MMOL/L (ref -9–1.8)
BASE EXCESS BLDA CALC-SCNC: -1 MMOL/L (ref -9–1.8)
BASE EXCESS BLDA CALC-SCNC: -1.1 MMOL/L (ref -9–1.8)
BASE EXCESS BLDA CALC-SCNC: -1.2 MMOL/L (ref -9–1.8)
BASE EXCESS BLDA CALC-SCNC: -1.4 MMOL/L (ref -9–1.8)
BASOPHILS # BLD MANUAL: 0 10E3/UL (ref 0–0.2)
BASOPHILS # BLD MANUAL: 0.2 10E3/UL (ref 0–0.2)
BASOPHILS NFR BLD MANUAL: 0 %
BASOPHILS NFR BLD MANUAL: 1 %
BUN SERPL-MCNC: 28.9 MG/DL (ref 8–23)
BUN SERPL-MCNC: 29.1 MG/DL (ref 8–23)
BUN SERPL-MCNC: 30.1 MG/DL (ref 8–23)
BUN SERPL-MCNC: 31.3 MG/DL (ref 8–23)
BUN SERPL-MCNC: 32 MG/DL (ref 8–23)
BURR CELLS BLD QL SMEAR: SLIGHT
CA-I BLD-MCNC: 4.6 MG/DL (ref 4.4–5.2)
CA-I BLD-MCNC: 4.7 MG/DL (ref 4.4–5.2)
CALCIUM SERPL-MCNC: 8.1 MG/DL (ref 8.8–10.2)
CALCIUM SERPL-MCNC: 8.2 MG/DL (ref 8.8–10.2)
CALCIUM SERPL-MCNC: 8.3 MG/DL (ref 8.8–10.2)
CALCIUM SERPL-MCNC: 8.3 MG/DL (ref 8.8–10.2)
CALCIUM SERPL-MCNC: 8.4 MG/DL (ref 8.8–10.2)
CHLORIDE SERPL-SCNC: 106 MMOL/L (ref 98–107)
CHLORIDE SERPL-SCNC: 107 MMOL/L (ref 98–107)
CREAT SERPL-MCNC: 0.65 MG/DL (ref 0.67–1.17)
CREAT SERPL-MCNC: 0.67 MG/DL (ref 0.67–1.17)
CREAT SERPL-MCNC: 0.68 MG/DL (ref 0.67–1.17)
CREAT SERPL-MCNC: 0.7 MG/DL (ref 0.67–1.17)
CREAT SERPL-MCNC: 0.71 MG/DL (ref 0.67–1.17)
D DIMER PPP FEU-MCNC: 2.51 UG/ML FEU (ref 0–0.5)
D DIMER PPP FEU-MCNC: 2.93 UG/ML FEU (ref 0–0.5)
DEPRECATED HCO3 PLAS-SCNC: 20 MMOL/L (ref 22–29)
DEPRECATED HCO3 PLAS-SCNC: 21 MMOL/L (ref 22–29)
DEPRECATED HCO3 PLAS-SCNC: 22 MMOL/L (ref 22–29)
EOSINOPHIL # BLD MANUAL: 0 10E3/UL (ref 0–0.7)
EOSINOPHIL # BLD MANUAL: 0 10E3/UL (ref 0–0.7)
EOSINOPHIL # BLD MANUAL: 0.2 10E3/UL (ref 0–0.7)
EOSINOPHIL # BLD MANUAL: 0.6 10E3/UL (ref 0–0.7)
EOSINOPHIL NFR BLD MANUAL: 0 %
EOSINOPHIL NFR BLD MANUAL: 0 %
EOSINOPHIL NFR BLD MANUAL: 1 %
EOSINOPHIL NFR BLD MANUAL: 4 %
ERYTHROCYTE [DISTWIDTH] IN BLOOD BY AUTOMATED COUNT: 17.4 % (ref 10–15)
ERYTHROCYTE [DISTWIDTH] IN BLOOD BY AUTOMATED COUNT: 17.8 % (ref 10–15)
ERYTHROCYTE [DISTWIDTH] IN BLOOD BY AUTOMATED COUNT: 17.8 % (ref 10–15)
ERYTHROCYTE [DISTWIDTH] IN BLOOD BY AUTOMATED COUNT: 18.1 % (ref 10–15)
FIBRINOGEN PPP-MCNC: 507 MG/DL (ref 170–490)
FIBRINOGEN PPP-MCNC: 561 MG/DL (ref 170–490)
GFR SERPL CREATININE-BSD FRML MDRD: >90 ML/MIN/1.73M2
GLUCOSE BLDC GLUCOMTR-MCNC: 139 MG/DL (ref 70–99)
GLUCOSE BLDC GLUCOMTR-MCNC: 146 MG/DL (ref 70–99)
GLUCOSE BLDC GLUCOMTR-MCNC: 160 MG/DL (ref 70–99)
GLUCOSE SERPL-MCNC: 127 MG/DL (ref 70–99)
GLUCOSE SERPL-MCNC: 136 MG/DL (ref 70–99)
GLUCOSE SERPL-MCNC: 138 MG/DL (ref 70–99)
GLUCOSE SERPL-MCNC: 157 MG/DL (ref 70–99)
GLUCOSE SERPL-MCNC: 169 MG/DL (ref 70–99)
GRAM STAIN RESULT: ABNORMAL
GRAM STAIN RESULT: ABNORMAL
HCO3 BLD-SCNC: 23 MMOL/L (ref 21–28)
HCT VFR BLD AUTO: 23.5 % (ref 40–53)
HCT VFR BLD AUTO: 24.2 % (ref 40–53)
HCT VFR BLD AUTO: 24.5 % (ref 40–53)
HCT VFR BLD AUTO: 25 % (ref 40–53)
HGB BLD-MCNC: 7.6 G/DL (ref 13.3–17.7)
HGB BLD-MCNC: 7.9 G/DL (ref 13.3–17.7)
HGB BLD-MCNC: 7.9 G/DL (ref 13.3–17.7)
HGB BLD-MCNC: 8.4 G/DL (ref 13.3–17.7)
HGB FREE PLAS-MCNC: 30 MG/DL
INR PPP: 1.09 (ref 0.85–1.15)
INR PPP: 1.12 (ref 0.85–1.15)
INR PPP: 1.14 (ref 0.85–1.15)
INR PPP: 1.15 (ref 0.85–1.15)
INR PPP: 1.22 (ref 0.85–1.15)
LACTATE SERPL-SCNC: 1.1 MMOL/L (ref 0.7–2)
LACTATE SERPL-SCNC: 1.2 MMOL/L (ref 0.7–2)
LACTATE SERPL-SCNC: 1.2 MMOL/L (ref 0.7–2)
LACTATE SERPL-SCNC: 1.4 MMOL/L (ref 0.7–2)
LYMPHOCYTES # BLD MANUAL: 0.8 10E3/UL (ref 0.8–5.3)
LYMPHOCYTES # BLD MANUAL: 0.9 10E3/UL (ref 0.8–5.3)
LYMPHOCYTES # BLD MANUAL: 1 10E3/UL (ref 0.8–5.3)
LYMPHOCYTES # BLD MANUAL: 1.5 10E3/UL (ref 0.8–5.3)
LYMPHOCYTES NFR BLD MANUAL: 4 %
LYMPHOCYTES NFR BLD MANUAL: 6 %
LYMPHOCYTES NFR BLD MANUAL: 6 %
LYMPHOCYTES NFR BLD MANUAL: 8 %
MAGNESIUM SERPL-MCNC: 2.1 MG/DL (ref 1.7–2.3)
MCH RBC QN AUTO: 29 PG (ref 26.5–33)
MCH RBC QN AUTO: 29.6 PG (ref 26.5–33)
MCH RBC QN AUTO: 29.7 PG (ref 26.5–33)
MCH RBC QN AUTO: 30 PG (ref 26.5–33)
MCHC RBC AUTO-ENTMCNC: 32.2 G/DL (ref 31.5–36.5)
MCHC RBC AUTO-ENTMCNC: 32.3 G/DL (ref 31.5–36.5)
MCHC RBC AUTO-ENTMCNC: 32.6 G/DL (ref 31.5–36.5)
MCHC RBC AUTO-ENTMCNC: 33.6 G/DL (ref 31.5–36.5)
MCV RBC AUTO: 89 FL (ref 78–100)
MCV RBC AUTO: 90 FL (ref 78–100)
MCV RBC AUTO: 91 FL (ref 78–100)
MCV RBC AUTO: 92 FL (ref 78–100)
METAMYELOCYTES # BLD MANUAL: 0.3 10E3/UL
METAMYELOCYTES # BLD MANUAL: 0.6 10E3/UL
METAMYELOCYTES NFR BLD MANUAL: 2 %
METAMYELOCYTES NFR BLD MANUAL: 3 %
MONOCYTES # BLD MANUAL: 0.6 10E3/UL (ref 0–1.3)
MONOCYTES # BLD MANUAL: 0.7 10E3/UL (ref 0–1.3)
MONOCYTES # BLD MANUAL: 0.9 10E3/UL (ref 0–1.3)
MONOCYTES # BLD MANUAL: 1.8 10E3/UL (ref 0–1.3)
MONOCYTES NFR BLD MANUAL: 3 %
MONOCYTES NFR BLD MANUAL: 4 %
MONOCYTES NFR BLD MANUAL: 6 %
MONOCYTES NFR BLD MANUAL: 9 %
MYELOCYTES # BLD MANUAL: 0.2 10E3/UL
MYELOCYTES # BLD MANUAL: 0.2 10E3/UL
MYELOCYTES # BLD MANUAL: 1 10E3/UL
MYELOCYTES # BLD MANUAL: 1.1 10E3/UL
MYELOCYTES NFR BLD MANUAL: 1 %
MYELOCYTES NFR BLD MANUAL: 1 %
MYELOCYTES NFR BLD MANUAL: 5 %
MYELOCYTES NFR BLD MANUAL: 7 %
NEUTROPHILS # BLD MANUAL: 11.4 10E3/UL (ref 1.6–8.3)
NEUTROPHILS # BLD MANUAL: 15.2 10E3/UL (ref 1.6–8.3)
NEUTROPHILS # BLD MANUAL: 15.8 10E3/UL (ref 1.6–8.3)
NEUTROPHILS # BLD MANUAL: 16.3 10E3/UL (ref 1.6–8.3)
NEUTROPHILS NFR BLD MANUAL: 74 %
NEUTROPHILS NFR BLD MANUAL: 82 %
NEUTROPHILS NFR BLD MANUAL: 83 %
NEUTROPHILS NFR BLD MANUAL: 88 %
O2/TOTAL GAS SETTING VFR VENT: 35 %
OXYHGB MFR BLD: 97 % (ref 92–100)
OXYHGB MFR BLD: 98 % (ref 92–100)
PCO2 BLD: 32 MM HG (ref 35–45)
PCO2 BLD: 34 MM HG (ref 35–45)
PCO2 BLD: 34 MM HG (ref 35–45)
PCO2 BLD: 35 MM HG (ref 35–45)
PCO2 BLD: 36 MM HG (ref 35–45)
PCO2 BLD: 37 MM HG (ref 35–45)
PH BLD: 7.41 [PH] (ref 7.35–7.45)
PH BLD: 7.42 [PH] (ref 7.35–7.45)
PH BLD: 7.42 [PH] (ref 7.35–7.45)
PH BLD: 7.44 [PH] (ref 7.35–7.45)
PH BLD: 7.44 [PH] (ref 7.35–7.45)
PH BLD: 7.46 [PH] (ref 7.35–7.45)
PHOSPHATE SERPL-MCNC: 3.1 MG/DL (ref 2.5–4.5)
PLAT MORPH BLD: ABNORMAL
PLATELET # BLD AUTO: 123 10E3/UL (ref 150–450)
PLATELET # BLD AUTO: 131 10E3/UL (ref 150–450)
PLATELET # BLD AUTO: 138 10E3/UL (ref 150–450)
PLATELET # BLD AUTO: 147 10E3/UL (ref 150–450)
PO2 BLD: 122 MM HG (ref 80–105)
PO2 BLD: 123 MM HG (ref 80–105)
PO2 BLD: 126 MM HG (ref 80–105)
PO2 BLD: 129 MM HG (ref 80–105)
PO2 BLD: 135 MM HG (ref 80–105)
PO2 BLD: 138 MM HG (ref 80–105)
POLYCHROMASIA BLD QL SMEAR: SLIGHT
POTASSIUM SERPL-SCNC: 3.9 MMOL/L (ref 3.4–5.3)
POTASSIUM SERPL-SCNC: 3.9 MMOL/L (ref 3.4–5.3)
POTASSIUM SERPL-SCNC: 4.1 MMOL/L (ref 3.4–5.3)
POTASSIUM SERPL-SCNC: 4.3 MMOL/L (ref 3.4–5.3)
POTASSIUM SERPL-SCNC: 4.5 MMOL/L (ref 3.4–5.3)
RBC # BLD AUTO: 2.62 10E6/UL (ref 4.4–5.9)
RBC # BLD AUTO: 2.66 10E6/UL (ref 4.4–5.9)
RBC # BLD AUTO: 2.67 10E6/UL (ref 4.4–5.9)
RBC # BLD AUTO: 2.8 10E6/UL (ref 4.4–5.9)
RBC MORPH BLD: ABNORMAL
SODIUM SERPL-SCNC: 135 MMOL/L (ref 136–145)
SODIUM SERPL-SCNC: 137 MMOL/L (ref 136–145)
SODIUM SERPL-SCNC: 138 MMOL/L (ref 136–145)
SODIUM SERPL-SCNC: 138 MMOL/L (ref 136–145)
SODIUM SERPL-SCNC: 139 MMOL/L (ref 136–145)
TARGETS BLD QL SMEAR: SLIGHT
UFH PPP CHRO-ACNC: 0.25 IU/ML
UFH PPP CHRO-ACNC: 0.26 IU/ML
UFH PPP CHRO-ACNC: 0.27 IU/ML
UFH PPP CHRO-ACNC: 0.27 IU/ML
UFH PPP CHRO-ACNC: 0.32 IU/ML
UFH PPP CHRO-ACNC: 0.38 IU/ML
WBC # BLD AUTO: 15.4 10E3/UL (ref 4–11)
WBC # BLD AUTO: 17.3 10E3/UL (ref 4–11)
WBC # BLD AUTO: 19.3 10E3/UL (ref 4–11)
WBC # BLD AUTO: 19.6 10E3/UL (ref 4–11)

## 2022-07-16 PROCEDURE — 80069 RENAL FUNCTION PANEL: CPT | Performed by: SURGERY

## 2022-07-16 PROCEDURE — 82330 ASSAY OF CALCIUM: CPT | Performed by: SURGERY

## 2022-07-16 PROCEDURE — 80048 BASIC METABOLIC PNL TOTAL CA: CPT | Performed by: SURGERY

## 2022-07-16 PROCEDURE — 85610 PROTHROMBIN TIME: CPT | Performed by: SURGERY

## 2022-07-16 PROCEDURE — 94003 VENT MGMT INPAT SUBQ DAY: CPT

## 2022-07-16 PROCEDURE — 85014 HEMATOCRIT: CPT | Performed by: SURGERY

## 2022-07-16 PROCEDURE — 200N000002 HC R&B ICU UMMC

## 2022-07-16 PROCEDURE — 85007 BL SMEAR W/DIFF WBC COUNT: CPT | Performed by: SURGERY

## 2022-07-16 PROCEDURE — 250N000013 HC RX MED GY IP 250 OP 250 PS 637: Performed by: SURGERY

## 2022-07-16 PROCEDURE — 85384 FIBRINOGEN ACTIVITY: CPT | Performed by: SURGERY

## 2022-07-16 PROCEDURE — 999N000157 HC STATISTIC RCP TIME EA 10 MIN

## 2022-07-16 PROCEDURE — 85300 ANTITHROMBIN III ACTIVITY: CPT | Performed by: SURGERY

## 2022-07-16 PROCEDURE — 250N000011 HC RX IP 250 OP 636: Performed by: STUDENT IN AN ORGANIZED HEALTH CARE EDUCATION/TRAINING PROGRAM

## 2022-07-16 PROCEDURE — 85520 HEPARIN ASSAY: CPT | Performed by: SURGERY

## 2022-07-16 PROCEDURE — 250N000011 HC RX IP 250 OP 636: Performed by: SURGERY

## 2022-07-16 PROCEDURE — 250N000011 HC RX IP 250 OP 636: Performed by: THORACIC SURGERY (CARDIOTHORACIC VASCULAR SURGERY)

## 2022-07-16 PROCEDURE — 83735 ASSAY OF MAGNESIUM: CPT | Performed by: SURGERY

## 2022-07-16 PROCEDURE — 36415 COLL VENOUS BLD VENIPUNCTURE: CPT | Performed by: SURGERY

## 2022-07-16 PROCEDURE — 82805 BLOOD GASES W/O2 SATURATION: CPT | Performed by: SURGERY

## 2022-07-16 PROCEDURE — 85730 THROMBOPLASTIN TIME PARTIAL: CPT | Performed by: SURGERY

## 2022-07-16 PROCEDURE — 85379 FIBRIN DEGRADATION QUANT: CPT | Performed by: SURGERY

## 2022-07-16 PROCEDURE — 85520 HEPARIN ASSAY: CPT | Performed by: THORACIC SURGERY (CARDIOTHORACIC VASCULAR SURGERY)

## 2022-07-16 PROCEDURE — 250N000013 HC RX MED GY IP 250 OP 250 PS 637: Performed by: INTERNAL MEDICINE

## 2022-07-16 PROCEDURE — 85027 COMPLETE CBC AUTOMATED: CPT | Performed by: SURGERY

## 2022-07-16 PROCEDURE — 84460 ALANINE AMINO (ALT) (SGPT): CPT | Performed by: SURGERY

## 2022-07-16 PROCEDURE — 250N000013 HC RX MED GY IP 250 OP 250 PS 637: Performed by: STUDENT IN AN ORGANIZED HEALTH CARE EDUCATION/TRAINING PROGRAM

## 2022-07-16 PROCEDURE — 82040 ASSAY OF SERUM ALBUMIN: CPT | Performed by: SURGERY

## 2022-07-16 PROCEDURE — 71045 X-RAY EXAM CHEST 1 VIEW: CPT

## 2022-07-16 PROCEDURE — 999N000015 HC STATISTIC ARTERIAL MONITORING DAILY

## 2022-07-16 PROCEDURE — 83605 ASSAY OF LACTIC ACID: CPT | Performed by: SURGERY

## 2022-07-16 PROCEDURE — 250N000009 HC RX 250: Performed by: STUDENT IN AN ORGANIZED HEALTH CARE EDUCATION/TRAINING PROGRAM

## 2022-07-16 PROCEDURE — 94640 AIRWAY INHALATION TREATMENT: CPT

## 2022-07-16 PROCEDURE — 71045 X-RAY EXAM CHEST 1 VIEW: CPT | Mod: 26 | Performed by: RADIOLOGY

## 2022-07-16 PROCEDURE — 99233 SBSQ HOSP IP/OBS HIGH 50: CPT | Mod: GC | Performed by: INTERNAL MEDICINE

## 2022-07-16 PROCEDURE — 99291 CRITICAL CARE FIRST HOUR: CPT | Mod: 24 | Performed by: ANESTHESIOLOGY

## 2022-07-16 PROCEDURE — 87040 BLOOD CULTURE FOR BACTERIA: CPT | Performed by: SURGERY

## 2022-07-16 PROCEDURE — 83051 HEMOGLOBIN PLASMA: CPT | Performed by: SURGERY

## 2022-07-16 RX ORDER — HYDROXYCHLOROQUINE SULFATE 200 MG/1
200 TABLET, FILM COATED ORAL 2 TIMES DAILY
Status: DISCONTINUED | OUTPATIENT
Start: 2022-07-16 | End: 2022-08-21 | Stop reason: HOSPADM

## 2022-07-16 RX ADMIN — Medication 150 MCG/HR: at 16:27

## 2022-07-16 RX ADMIN — PIPERACILLIN AND TAZOBACTAM 4.5 G: 4; .5 INJECTION, POWDER, LYOPHILIZED, FOR SOLUTION INTRAVENOUS at 11:04

## 2022-07-16 RX ADMIN — HYDRALAZINE HYDROCHLORIDE 10 MG: 10 TABLET, FILM COATED ORAL at 14:03

## 2022-07-16 RX ADMIN — ACETAMINOPHEN 975 MG: 325 TABLET, FILM COATED ORAL at 05:09

## 2022-07-16 RX ADMIN — Medication 1 PACKET: at 14:03

## 2022-07-16 RX ADMIN — DEXMEDETOMIDINE HYDROCHLORIDE 1.2 MCG/KG/HR: 4 INJECTION, SOLUTION INTRAVENOUS at 14:00

## 2022-07-16 RX ADMIN — INSULIN ASPART 1 UNITS: 100 INJECTION, SOLUTION INTRAVENOUS; SUBCUTANEOUS at 11:21

## 2022-07-16 RX ADMIN — DEXMEDETOMIDINE HYDROCHLORIDE 1.2 MCG/KG/HR: 4 INJECTION, SOLUTION INTRAVENOUS at 08:52

## 2022-07-16 RX ADMIN — QUETIAPINE FUMARATE 25 MG: 25 TABLET ORAL at 08:04

## 2022-07-16 RX ADMIN — DEXMEDETOMIDINE HYDROCHLORIDE 1.2 MCG/KG/HR: 4 INJECTION, SOLUTION INTRAVENOUS at 18:42

## 2022-07-16 RX ADMIN — HYDROXYCHLOROQUINE SULFATE 200 MG: 200 TABLET, FILM COATED ORAL at 10:54

## 2022-07-16 RX ADMIN — Medication 1 PACKET: at 08:06

## 2022-07-16 RX ADMIN — Medication 40 MG: at 08:12

## 2022-07-16 RX ADMIN — ACETAMINOPHEN 975 MG: 325 TABLET, FILM COATED ORAL at 21:10

## 2022-07-16 RX ADMIN — MULTIVITAMIN 15 ML: LIQUID ORAL at 08:06

## 2022-07-16 RX ADMIN — Medication 5 MG: at 21:10

## 2022-07-16 RX ADMIN — DULOXETINE 30 MG: 30 CAPSULE, DELAYED RELEASE ORAL at 08:04

## 2022-07-16 RX ADMIN — PIPERACILLIN AND TAZOBACTAM 4.5 G: 4; .5 INJECTION, POWDER, LYOPHILIZED, FOR SOLUTION INTRAVENOUS at 05:09

## 2022-07-16 RX ADMIN — QUETIAPINE FUMARATE 25 MG: 25 TABLET ORAL at 16:29

## 2022-07-16 RX ADMIN — PIPERACILLIN AND TAZOBACTAM 4.5 G: 4; .5 INJECTION, POWDER, LYOPHILIZED, FOR SOLUTION INTRAVENOUS at 17:32

## 2022-07-16 RX ADMIN — Medication 1 PACKET: at 21:11

## 2022-07-16 RX ADMIN — FLUTICASONE PROPIONATE AND SALMETEROL XINAFOATE 2 PUFF: 115; 21 AEROSOL, METERED RESPIRATORY (INHALATION) at 09:02

## 2022-07-16 RX ADMIN — INSULIN GLARGINE 35 UNITS: 100 INJECTION, SOLUTION SUBCUTANEOUS at 08:12

## 2022-07-16 RX ADMIN — PIPERACILLIN AND TAZOBACTAM 4.5 G: 4; .5 INJECTION, POWDER, LYOPHILIZED, FOR SOLUTION INTRAVENOUS at 23:52

## 2022-07-16 RX ADMIN — QUETIAPINE FUMARATE 50 MG: 50 TABLET ORAL at 21:10

## 2022-07-16 RX ADMIN — HYDRALAZINE HYDROCHLORIDE 10 MG: 10 TABLET, FILM COATED ORAL at 05:09

## 2022-07-16 RX ADMIN — ASPIRIN 81 MG CHEWABLE TABLET 81 MG: 81 TABLET CHEWABLE at 08:06

## 2022-07-16 RX ADMIN — HEPARIN SODIUM AND DEXTROSE 1100 UNITS/HR: 10000; 5 INJECTION INTRAVENOUS at 18:48

## 2022-07-16 RX ADMIN — HYDROXYCHLOROQUINE SULFATE 200 MG: 200 TABLET, FILM COATED ORAL at 21:10

## 2022-07-16 RX ADMIN — INSULIN ASPART 1 UNITS: 100 INJECTION, SOLUTION INTRAVENOUS; SUBCUTANEOUS at 08:25

## 2022-07-16 RX ADMIN — DEXMEDETOMIDINE HYDROCHLORIDE 1.2 MCG/KG/HR: 4 INJECTION, SOLUTION INTRAVENOUS at 21:31

## 2022-07-16 RX ADMIN — HYDRALAZINE HYDROCHLORIDE 10 MG: 10 TABLET, FILM COATED ORAL at 21:11

## 2022-07-16 RX ADMIN — ACETAMINOPHEN 975 MG: 325 TABLET, FILM COATED ORAL at 14:03

## 2022-07-16 ASSESSMENT — ACTIVITIES OF DAILY LIVING (ADL)
ADLS_ACUITY_SCORE: 38

## 2022-07-16 NOTE — PROGRESS NOTES
"  VAD Shift Summary:      VAD Equipment:  Primary console serial number: [id number]  Backup console serial number: [id number]  Circuit lot number: [id number]  Pump head lot number: [id number]    Patient remains on Protek Duo RVAD with Centrimag, all equipment is functioning and alarms are appropriately set. RPM's 3600 with flow range 3.0 L/min. Circuit remains free of air. Clot and fibrin noted at connectors and in \"arterial\" limb of circuit- stable this shift. Cannulas are secure with no bleeding from site. Extremities are pale, perfused.    Significant Shift Events: RVAD flow weaned to 3 L per team.    Vent settings:  Vent Mode: CMV/AC  (Continuous Mandatory Ventilation/ Assist Control)  FiO2 (%): 35 %  Resp Rate (Set): 12 breaths/min  Tidal Volume (Set, mL): 450 mL  PEEP (cm H2O): 5 cmH2O  Pressure Support (cm H2O): 7 cmH2O  Resp: 24      Heparin is running at 1100 u/hr, XA in goal at 0.27.      Intake/Output Summary (Last 24 hours) at 7/16/2022 1749  Last data filed at 7/16/2022 1700  Gross per 24 hour   Intake 3191.9 ml   Output 1710 ml   Net 1481.9 ml       CXR:  Recent Results (from the past 24 hour(s))   XR Chest Port 1 View    Narrative    EXAM: XR CHEST PORT 1 VIEW  7/16/2022 3:29 AM     HISTORY:  RVAD/LVAD/ETT       COMPARISON:  7/15/2022    FINDINGS: Single view of the chest. Postsurgical changes of the chest  with intact median sternotomy wires. Endotracheal tube tip projects  over the low thoracic trachea. Enteric tubes course below the  diaphragm and beyond the field-of-view. Right IJ RVAD tip projects  over the main portal trunk. Right upper extremity PICC tip in the  region of the mid SVC. Stable left chest wall cardiac device in the  LVAD. Stable mediastinal drains and left basilar chest tube.    Stable cardiac mediastinal silhouette. Similar small pleural  effusions. No appreciable pneumothorax. Low lung volumes with  continued bilateral diffuse mixed interstitial and airspace opacities.      " Impression    IMPRESSION:   1. Stable support devices.  2. No significant change in small pleural effusions and bilateral  diffuse pulmonary opacities.    I have personally reviewed the examination and initial interpretation  and I agree with the findings.    MILES KENNY MD         SYSTEM ID:  [id number]       Labs:  Recent Labs   Lab 07/16/22  1542 07/16/22  1211 07/16/22  0814 07/16/22  0453   PH 7.42 7.44 7.44 7.46*   PCO2 36 34* 34* 32*   PO2 129* 138* 126* 122*   HCO3 23 23 23 23   O2PER 35 35 35 35       Lab Results   Component Value Date    HGB 7.9 (L) 07/16/2022    PHGB 30 (H) 07/16/2022     (L) 07/16/2022    FIBR 507 (H) 07/16/2022    INR 1.14 07/16/2022     (HH) 07/16/2022    DD 2.93 (H) 07/16/2022    ANTCH 93 07/16/2022         Plan is to continue support.      Alana Hinojosa, RT  ECMO Specialist  7/16/2022 5:49 PM

## 2022-07-16 NOTE — PLAN OF CARE
Nights:  PERLA  Pt opens eyes, tracks about 50% of time.  Not following commands.  FINCH.  Afebrile with clear lungs.  , MAP 65.  CMV 35%/12/450/5.  LVAD/RVAD numbers appropriate with no alarms.  Fitzgerald 30cc/hr.  CT 10cc/hr each container.  Hgb drifted down to 6.7 at 2300 last evening, 1 unit PRBC given recheck 8.4.  Lytes ok.  Seroquel started for anxiety along with Precedex.  Pt still restless at times.    P  Increase activity.  Pressure support, reduce sedation as tolerated.

## 2022-07-16 NOTE — PROGRESS NOTES
CV ICU PROGRESS NOTE  07/16/2022        CO-MORBIDITIES  1. Cardiogenic shock (H)  (primary encounter diagnosis)  2. Ischemic cardiomyopathy  3. Heart failure with reduced ejection fraction, NYHA class III (H)  4. Coronary artery disease involving native coronary artery of native heart without angina pectoris      ASSESSMENT Dandy Sands is a 61 year old male with a PMH of CAD s/p PCI to LAD, MI, ICM, acute on chronic heart failure, s/p CRT-D, severe MR, antiphospholipid antibody syndrome on chronic warfarin, SLE, HTN, HLD, recent hospitalization 5/16-5/26 s/p RCA, LAD stenting who underwent RVAD (29F Protek duo RIJ) LVAD placement (HeartMate 3) on 7/8/22 by Dr. Zamora. Intraoperative course complicated by IABP dysfunction and RV failure, requiring crash onto bypass / vasopressor support, NO, and protek placement. Now s/p chest closure and NO wean 7/11. Return to OR for hemopericardium (7/12) and chest re-closure 7/13.    Today's Progress:  - Bedside ECHO  - Add melatonin 10 mg HS  - Resume PTA Hydroxychlorquine  - PS trial     PLAN:   Neuro/ pain/ sedation:  #Acute Postoperative pain  #Depression   #Anxiety due to a medical condition  #Insomnia  - Monitor neurological status. Notify the MD for any acute changes in exam.  - Pain: fentanyl gtt. Scheduled tylenol. PRN tylenol, oxycodone, dilaudid.  - Sedation: precedex ggt, RASS 0 to -1  - Duloxetine 30mg  - Add Melatonin 10 mg HS  - Seroquel 25 / 25 / 50  - Holding PTA meds: hydroxyzine 25mg PRN, zolpidem 5mg, melatonin 3mg, lorazepam 0.5mg    Pulmonary:   #Acute hypoxic respiratory failure on iMV  #Mild-moderate emphysema  #History of COVID-19  - Off nitric 7/12  - Mechanical ventilation, wean after off chest closure   - PST, possible extubation this afternoon    Cardiovascular:    #S/p LVAD placement (HeartMate 3) on 7/8/22   #S/p RVAD (29F Protek duo RIJ) on 7/8/22  #S/p chest closure 7/11  #Cardiogenic shock  #Advanced ischemic cardiomyopathy, class IV, stage  D  #CAD s/p PCI to LAD ()  #S/p CRT-D ()  #History of MI ()  #Severe MR  #Antiphospholipid antibody syndrome on chronic warfarin  #HTN, HLD  #Recent hospitalization - s/p RCA, LAD stenting  #Atrial fibrillation   - Monitor hemodynamic status. Chest closure and oxygenator splicing . Reopened  for I&D and left open, plan for closure .  - Goal MAP > 65   - PTA meds: atorvastatin 40mg, clopidogrel 75mg, lasix 40mg, warfarin 5mg  - LVAD management per [facility] service   - flows stable, circuit functioning w/o clots/fibrin  - AC Plan: straight rate 400U/hr   - Hydralazine 10gm q8h  - Hydralazine prn MAP >90  - Bedside ECHO to assess RV function with possible turn down     GI / Nutrition:   #GERD  #Congestive hepatopathy in the setting of cardiac insuffiency  - NPO 2/2 intubation   - Titrate NJT to goal  - Bowel regimen (miralax / senna)  - Nutrition consulted.  - Trend LFT's    Hematemesis / oral mucosal bleedin/12 x2, 7/13 x1 (oral, around ETT/ETT clear).   - continue PPI BID x 72 hours (7/15 change after 12:00 noon)   - hgb stable   - OG without patience bloody output     Renal/ Fluid Balance:    - Strict I/O, daily weights   - Avoid/limit nephrotoxins as able  - Replete lytes PRN per protocol  - PTA meds: tamsulosin 0.4mg (held)  - Fluid goal net even      Endocrine:    #Stress induced hyperglycemia  Preop A1c 5.5%  - Continue Insulin gtt perioperatively   - Goal BG <180 for optimal healing       ID/ Antibiotics:  #Periopearive antibiosis (s/p course of Cefepime, vancomycin, rifampin, fluconazole)  #Enterococcus faecalis PNA  - Discontinue Cefepime and Vancomycin (-), chest now closed   - Trend fever curve   - Pan culture (): Bcx x2, UA/UC -> negative     - Endotracheal sputum culture (4+ candida albicans, 1+ E.Coli, 3+ enterococcus faecalis)  - Stat Zosyn, pending sensitivities     Heme:     #Acute blood loss anemia  #Acute blood loss thrombocytopenia  #History of  DVT and PE   #Antiphospholipid antibody syndrome  #History of iron deficiency anemia  - Monitor for s/sx of bleeding  - s/p 3u pRBC, 2u Plt 7/12 perioperative needs  - transfused 2 units 7/15  - Trend CBC and coags.  - Hgb goal > 8  - Plt goal > 20    Rheumatology:  #SLE  - Chronic, symptoms include arthritis, photosensitivity, fatigue, and skin manifestations  - PTA meds: hydroxychloroquine 200mg, resumed 7/16    Prophylaxis:    - DVT prophylaxis: SCD + low intensity heparin ggt   - PPI  - Bowel regimen  - PT/OT    Lines / Tubes / Drains:  - R brachial triple lumen PICC  - L upper arm PIV  - Left radial A-line       Disposition:  - CV ICU.    Patient seen, findings and plan discussed with CV ICU staff, Dr. Samano.      Joseph Almaguer MD  Anesthesiology, PGY4    ====================================================        SUBJECTIVE  - 1u pRBC overnight    OBJECTIVE    1. VITAL SIGNS    Temp:  [97.3  F (36.3  C)-99.1  F (37.3  C)] 97.9  F (36.6  C)  Pulse:  [] 105  Resp:  [12-30] 25  BP: (110)/(91) 110/91  MAP:  [62 mmHg-107 mmHg] 95 mmHg  Arterial Line BP: ()/(52-96) 103/84  FiO2 (%):  [35 %] 35 %  SpO2:  [96 %-100 %] 100 %  Vent Mode: (S) CMV/AC  (Continuous Mandatory Ventilation/ Assist Control)  FiO2 (%): 35 %  Resp Rate (Set): 12 breaths/min  Tidal Volume (Set, mL): 450 mL  PEEP (cm H2O): 5 cmH2O  Pressure Support (cm H2O): 7 cmH2O  Resp: 25        2. INTAKE/ OUTPUT    I/O last 3 completed shifts:  In: 3518.89 [I.V.:1658.89; NG/GT:360]  Out: 2530 [Urine:1690; Emesis/NG output:50; Stool:400; Chest Tube:390]      3. PHYSICAL EXAMINATION    General: sedated, critically ill  Neuro: Moving all extremities spontaneously, not following commands  Resp: intubated  CV: Tachycardic  Abdomen: Soft, Non-distended  Skin/Incisions: chest closed, right internal jugular cannulation, left femoral CVC/arterial line.  Extremities: warm and well perfused. LUE ecchymosis, unchanged      4.  INVESTIGATIONS    Arterial Blood Gases   Recent Labs   Lab 07/16/22  1211 07/16/22  0814 07/16/22  0453 07/16/22  0141   PH 7.44 7.44 7.46* 7.42   PCO2 34* 34* 32* 35   PO2 138* 126* 122* 135*   HCO3 23 23 23 23     Complete Blood Count   Recent Labs   Lab 07/16/22  0944 07/16/22  0452 07/15/22  2200 07/15/22  1808   WBC 17.3* 19.6* 16.8* 21.3*   HGB 7.6* 8.4* 6.7* 7.4*   * 131* 119* 142*     Basic Metabolic Panel  Recent Labs   Lab 07/16/22  1114 07/16/22  0944 07/16/22  0821 07/16/22  0452 07/15/22  2206 07/15/22  1556 07/15/22  1547   NA  --  138  --  137 135*  --  135*   POTASSIUM  --  3.9  --  4.5 4.3  --  4.4   CHLORIDE  --  107  --  107 106  --  104   CO2  --  22  --  20* 21*  --  21*   BUN  --  31.3*  --  29.1* 28.9*  --  27.2*   CR  --  0.68  --  0.67 0.70  --  0.62*   * 157* 160* 169* 138*   < > 208*    < > = values in this interval not displayed.     Liver Function Tests  Recent Labs   Lab 07/16/22  0944 07/16/22  0452 07/15/22  2200 07/15/22  1547 07/15/22  1027 07/15/22  0345 07/14/22  0953 07/14/22  0337 07/13/22  0948 07/13/22  0354   ALT  --  17  --   --   --  20  --  23  --  25   ALBUMIN  --  2.4*  --   --   --  2.3*  --  2.3*  --  2.9*   INR 1.15 1.12 1.22* 1.23*   < > 1.23*   < > 1.21*   < > 1.18*    < > = values in this interval not displayed.     Pancreatic Enzymes  No lab results found in last 7 days.  Coagulation Profile  Recent Labs   Lab 07/16/22  0944 07/16/22  0452 07/15/22  2200 07/15/22  1547   INR 1.15 1.12 1.22* 1.23*   * 143* 164* 141*         5. RADIOLOGY    Recent Results (from the past 24 hour(s))   XR Feeding Tube Placement    Narrative    FEEDING TUBE PLACEMENT 7/11/2022 2:00 PM    INDICATION: Nutritional needs, open chest     COMPARISON: None.    FLUOROSCOPY TIME: 2.05 minutes    FINDINGS: The feeding tube was advanced under fluoroscopic guidance  with final position of tip in the second portion of the duodenum. A  small amount of barium was injected to  demonstrate placement within  the small bowel. The tube was flushed with sterile water and secured  via nasal bridle. There were no complications of the procedure.   Partially visualized chest tubes/drains and IVC filter.      Impression    IMPRESSION: Uncomplicated feeding tube placement with tip in the  second portion of the duodenum.    I, JOSE M OSHEA MD, attest that I was present for all critical  portions of the procedure and was immediately available to provide  guidance and assistance during the remainder of the procedure.    I have personally reviewed the examination and initial interpretation  and I agree with the findings.    JOSE M OSHEA MD         SYSTEM ID:  [id number]   Cardiac Device Check - Inpatient   Result Value    Date Time Interrogation Session [id number]    Implantable Pulse Generator  Medtronic    Implantable Pulse Generator Model [id number] Eliseo TIRADO DR    Implantable Pulse Generator Serial Number [id number]    Type Interrogation Session In Clinic    Clinic Name [facility]    Implantable Pulse Generator Type Defibrillator    Implantable Pulse Generator Implant Date 20180412    Implantable Lead  Medtronic    Implantable Lead Model 5076 CapSureFix Novus    Implantable Lead Serial Number [id number]    Implantable Lead Implant Date 20060306    Implantable Lead Polarity Type Bipolar Lead    Implantable Lead Location Detail 1 UNKNOWN    Implantable Lead Location Right Atrium    Implantable Lead  Medtronic    Implantable Lead Model 6949 Sprint Hartline    Implantable Lead Serial Number [id number]    Implantable Lead Implant Date 20060306    Implantable Lead Polarity Type Bipolar Lead    Implantable Lead Location Detail 1 UNKNOWN    Implantable Lead Location Right Ventricle    Marcos Setting Mode (NBG Code) MVP_AAI_DDD    Marcos Setting Lower Rate Limit 50    Marcos Setting Maximum Tracking Rate 130    Marcos Setting Maximum Sensor Rate 115     Marcos Setting Hysterisis Rate DISABLED    Marcos Setting TRISTEN Delay Low 350    Marcos Setting PAV Delay Low 350    Marcos Setting AT Mode Switch Rate 171    Lead Channel Setting Sensing Polarity Bipolar    Lead Channel Setting Sensing Anode Location Right Atrium    Lead Channel Setting Sensing Anode Terminal Ring    Lead Channel Setting Sensing Cathode Location Right Atrium    Lead Channel Setting Sensing Cathode Terminal Tip    Lead Channel Setting Sensing Sensitivity 0.3    Lead Channel Setting Sensing Polarity Bipolar    Lead Channel Setting Sensing Anode Location Right Ventricle    Lead Channel Setting Sensing Anode Terminal Ring    Lead Channel Setting Sensing Cathode Location Right Ventricle    Lead Channel Setting Sensing Cathode Terminal Tip    Lead Channel Setting Sensing Sensitivity 0.3    Lead Channel Setting Pacing Polarity Bipolar    Lead Channel Setting Pacing Anode Location Right Atrium    Lead Channel Setting Pacing Anode Terminal Ring    Lead Channel Setting Sensing Cathode Location Right Atrium    Lead Channel Setting Sensing Cathode Terminal Tip    Lead Channel Setting Pacing Pulse Width 0.4    Lead Channel Setting Pacing Amplitude 1.5    Lead Channel Setting Pacing Capture Mode Adaptive    Lead Channel Setting Pacing Polarity Bipolar    Lead Channel Setting Pacing Anode Location Right Ventricle    Lead Channel Setting Pacing Anode Terminal Ring    Lead Channel Setting Sensing Cathode Location Right Ventricle    Lead Channel Setting Sensing Cathode Terminal Tip    Lead Channel Setting Pacing Pulse Width 1    Lead Channel Setting Pacing Amplitude 5    Lead Channel Setting Pacing Capture Mode Adaptive    Zone Setting Type Category VF    Zone Setting Detection Interval 320    Zone Setting Detection Beats Numerator 30    Zone Setting Detection Beats Denominator 40    Zone Setting Type Category VT    Zone Setting Detection Interval 280    Zone Setting Type Category VT    Zone Setting Detection Interval 350     Zone Setting Type Category VT    Zone Setting Detection Interval 400    Zone Setting Type Category ATRIAL_FIBRILLATION    Zone Setting Type Category AT/AF    Zone Setting Detection Interval 350    Lead Channel Impedance Value 304    Lead Channel Sensing Intrinsic Amplitude 0.875    Lead Channel Sensing Intrinsic Amplitude 1.5    Lead Channel Pacing Threshold Amplitude 0.75    Lead Channel Pacing Threshold Pulse Width 0.4    Lead Channel Impedance Value 703    Lead Channel Impedance Value 608    Lead Channel Sensing Intrinsic Amplitude 0.875    Lead Channel Sensing Intrinsic Amplitude 1.125    Lead Channel Pacing Threshold Amplitude 2.5    Lead Channel Pacing Threshold Pulse Width 0.4    Battery Date Time of Measurements 90514001673421    Battery Status OK    Battery RRT Trigger 2.727    Battery Remaining Longevity 63    Battery Voltage 2.96    Capacitor Charge Type Reformation    Capacitor Last Charge Date Time 20220528111827    Capacitor Charge Time 3.973    Capacitor Charge Energy 18    Marcos Statistic Date Time Start 20220708170240    Marcos Statistic Date Time End 20220711160616    Marcos Statistic RA Percent Paced 0.1    Marcos Statistic RV Percent Paced 0.1    Marcos Statistic AP  Percent 0.02    Marcos Statistic AS  Percent 0.08    Marcos Statistic AP VS Percent 0.08    Marcos Statistic AS VS Percent 99.82    Atrial Tachy Statistic Date Time Start 20220708170240    Atrial Tachy Statistic Date Time End 74559795080604    Atrial Tachy Statistic AT/AF [place] Percent 3.1    Therapy Statistic Recent Shocks Delivered 0    Therapy Statistic Recent Shocks Aborted 0    Therapy Statistic Recent ATP Delivered 0    Therapy Statistic Recent Date Time Start 20220708170240    Therapy Statistic Recent Date Time End 63791912319389    Therapy Statistic Total Shocks Delivered 1    Therapy Statistic Total Shocks Aborted 0    Therapy Statistic Total ATP Delivered 0    Therapy Statistic Total  Date Time Start 60605129634899     Therapy Statistic Total  Date Time End 39333457935549    Episode Statistic Recent Count 28    Episode Statistic Type Category AT/AF    Episode Statistic Recent Count 0    Episode Statistic Type Category SVT    Episode Statistic Recent Count 0    Episode Statistic Type Category VT    Episode Statistic Recent Count 0    Episode Statistic Type Category VF    Episode Statistic Recent Count 0    Episode Statistic Type Category VT    Episode Statistic Recent Count 0    Episode Statistic Type Category VT    Episode Statistic Recent Count 0    Episode Statistic Type Category VT    Episode Statistic Recent Date Time Start 76524170630330    Episode Statistic Recent Date Time End 05442722069885    Episode Statistic Recent Date Time Start 32031904171910    Episode Statistic Recent Date Time End 65556813808048    Episode Statistic Recent Date Time Start 15618760346618    Episode Statistic Recent Date Time End 80850408827918    Episode Statistic Recent Date Time Start 34013048834881    Episode Statistic Recent Date Time End 09961848652081    Episode Statistic Recent Date Time Start 30720371647811    Episode Statistic Recent Date Time End 02025374834167    Episode Statistic Recent Date Time Start 17217358599756    Episode Statistic Recent Date Time End 04479734913626    Episode Statistic Recent Date Time Start 28636537679436    Episode Statistic Recent Date Time End 32879066470236    Episode Statistic Total Count 33    Episode Statistic Type Category AT/AF    Episode Statistic Total Count 0    Episode Statistic Type Category SVT    Episode Statistic Total Count 9    Episode Statistic Type Category VT    Episode Statistic Total Count 1    Episode Statistic Type Category VF    Episode Statistic Total Count 0    Episode Statistic Type Category VT    Episode Statistic Total Count 0    Episode Statistic Type Category VT    Episode Statistic Total Count 1    Episode Statistic Type Category VT    Episode Statistic Total Date Time Start  12424652926708    Episode Statistic Total Date Time End 11957548038946    Episode Statistic Total Date Time Start 07007307821394    Episode Statistic Total Date Time End 89382155942083    Episode Statistic Total Date Time Start 36988818774444    Episode Statistic Total Date Time End 57439997612460    Episode Statistic Total Date Time Start 95833599817180    Episode Statistic Total Date Time End 00928605991897    Episode Statistic Total Date Time Start 61292481348297    Episode Statistic Total Date Time End 95337576065989    Episode Statistic Total Date Time Start 33339143789108    Episode Statistic Total Date Time End 44749295186640    Episode Statistic Total Date Time Start 56989637524316    Episode Statistic Total Date Time End 06887603669918    Episode Identifier 56    Episode Type Category Monitor    Episode Date Time 81480082991795    Episode Duration 61    Episode Identifier 55    Episode Type Category Monitor    Episode Date Time [id number]    Episode Duration 144    Episode Identifier 54    Episode Type Category Monitor    Episode Date Time 27702833740621    Episode Duration 93    Episode Identifier 53    Episode Type Category Monitor    Episode Date Time 35212470925456    Episode Duration 214    Episode Identifier 52    Episode Type Category Monitor    Episode Date Time 67539467071824    Episode Duration 309    Episode Identifier 51    Episode Type Category Monitor    Episode Date Time 56110162836695    Episode Duration 316    Episode Identifier 50    Episode Type Category Monitor    Episode Date Time 64995956172953    Episode Duration 180    Episode Identifier 49    Episode Type Category Monitor    Episode Date Time [id number]    Episode Duration 597    Episode Identifier 48    Episode Type Category Monitor    Episode Date Time 59012422335558    Episode Duration 102    Episode Identifier 47    Episode Type Category Monitor    Episode Date Time [id number]    Episode Duration 970    Episode  Identifier 46    Episode Type Category Monitor    Episode Date Time 80921156279515    Episode Duration 350    Episode Identifier 45    Episode Type Category Monitor    Episode Date Time [id number]    Episode Duration 351    Episode Identifier 44    Episode Type Category Monitor    Episode Date Time [id number]    Episode Duration 102    Episode Identifier 43    Episode Type Category Monitor    Episode Date Time [id number]    Episode Duration 353    Episode Identifier 42    Episode Type Category Monitor    Episode Date Time [id number]    Episode Duration 328    Episode Identifier 41    Episode Type Category Monitor    Episode Date Time [id number]    Episode Duration 152    Episode Identifier 40    Episode Type Category Monitor    Episode Date Time [id number]    Episode Duration 235    Episode Identifier 39    Episode Type Category Monitor    Episode Date Time 59068634372781    Episode Duration 296    Episode Identifier 38    Episode Type Category Monitor    Episode Date Time [id number]    Episode Duration 203    Episode Identifier 37    Episode Type Category Monitor    Episode Date Time [id number]    Episode Duration 182    Episode Identifier 36    Episode Type Category Monitor    Episode Date Time [id number]    Episode Duration 361    Episode Identifier 35    Episode Type Category Monitor    Episode Date Time [id number]    Episode Duration 297    Episode Identifier 34    Episode Type Category Monitor    Episode Date Time [id number]    Episode Duration 99    Episode Identifier 33    Episode Type Category Monitor    Episode Date Time [id number]    Episode Duration 228    Episode Identifier 32    Episode Type Category Monitor    Episode Date Time [id number]    Episode Duration 278    Episode Identifier 31    Episode Type Category Monitor    Episode Date Time [id number]    Episode Duration 186    Episode Identifier 30    Episode Type Category Monitor    Episode Date  Time [id number]    Episode Duration 378    Episode Identifier 29    Episode Type Category Monitor    Episode Date Time [id number]    Episode Duration 322    Narrative    Patient seen in OR #27 pre-operatively for evaluation and iterative programming of their ICD per MD orders.     Device: Medtronic [id number] Evera XT   Normal device function  Mode: AAI>DDD  bpm  AP: <0.1%  : <0.1%  Intrinsic rhythm:  bpm  Thoracic Impedance: Below the reference line suggesting possible intrathoracic fluid accumulation.  Short V-V intervals: 0  Lead Trends Appear Stable: yes  Estimated battery longevity to RRT = 5.2 years  Atrial Arrhythmia: 28 AT/AF episodes recorded - 1 min 1 sec - 16 min 10 sec.  AF [place]: 3.1%  Anticoagulant:   Ventricular Arrhythmia: 0  Setting Changes: VY detection, FVT detection and VT monitor zones programmed off.     TRINA Amador RN    Dual lead ICD    I have reviewed and interpreted the device interrogation, settings, programming and nurse's summary. The device is functioning within normal device parameters. I agree with the current findings, assessment and plan.   XR Chest Port 1 View    Narrative    EXAM: TEMPORARY  7/11/2022 5:37 PM     HISTORY:  Chest closure, intraoperative       COMPARISON:  7/11/2022    FINDINGS  Technique: Semiupright AP view of the chest.     Devices: Single view of the chest. The endotracheal tube tip projects  over the midthoracic trachea. Gastric tube passes below the left  hemidiaphragm. Stable LVAD and left chest wall cardiac device with  atrial and ventricular leads. Right upper extremity PICC tip is  obscured in the region of the high SVC. Stable positioning of right IJ  RVAD with tip in the pulmonary artery. Mediastinal drains and left  basilar chest tube. Chest is open with surgical sponges projected over  the mediastinum.    Unchanged cardiomegaly. Small bilateral pleural effusions. No  discernible pneumothorax.       Impression    IMPRESSION:   No  unexpected retained foreign body. Remaining support devices stable  in position.    Results reported to LITA Camp OR circallie 7/11/2022 1749 hours    I have personally reviewed the examination and initial interpretation  and I agree with the findings.    KASI EAST MD         SYSTEM ID:  [id number]   XR Abdomen Port 1 View    Narrative    XR ABDOMEN PORT 1 VIEWS  7/11/2022 6:56 PM      HISTORY: post chest closure and verification of lines    COMPARISON: 7/11/2022, 7/3/2022    FINDINGS:   Single AP view of the abdomen. Stable thoracic findings. Stable IVC  filter. Feeding tube tip overlying the proximal jejunum with residual  enteric contrast over the stomach. Relative paucity of bowel gas. No  definite pneumatosis or portal venous gas.      Impression    IMPRESSION:   Feeding tube tip overlying the proximal jejunum.    I have personally reviewed the examination and initial interpretation  and I agree with the findings.    KASI EAST MD         SYSTEM ID:  [id number]   XR Chest Port 1 View    Narrative    EXAM: XR CHEST PORT 1 VIEW  7/12/2022 1:15 AM     HISTORY:  RVAD/LVAD/ETT       COMPARISON:  7/11/2022    FINDINGS: Single view of the chest. Postsurgical changes of the chest  with median sternotomy wires. Endotracheal tube tip projects over the  mid thoracic trachea. Feeding tube courses below the diaphragm and  beyond the field-of-view. The tip is at least to the body of the  stomach. Right upper extremity PICC tip is obscured in the region of  mid SVC. Right IJ RVAD with tip over the distal main pulmonary trunk.  Stable left chest wall cardiac device and LVAD. Stable mediastinal  drains and left basilar chest tube.     Stable enlarged cardiac silhouette. Small bilateral pleural effusions.  No appreciable pneumothorax. Similar perihilar and bibasilar  opacities.      Impression    IMPRESSION:   1. Stable support devices.  2. Stable small pleural effusions and bilateral pulmonary opacities.    I have  personally reviewed the examination and initial interpretation  and I agree with the findings.    LAI HERNADEZ MD         SYSTEM ID:  [id number]   Cardiac Device Check - Inpatient   Result Value    Date Time Interrogation Session [id number]    Implantable Pulse Generator  Medtronic    Implantable Pulse Generator Model [id number] Evera XT DR    Implantable Pulse Generator Serial Number [id number]    Type Interrogation Session In Clinic    Clinic Name [facility]    Implantable Pulse Generator Type Defibrillator    Implantable Pulse Generator Implant Date 20180412    Implantable Lead  Medtronic    Implantable Lead Model [id number] [employer]    Implantable Lead Serial Number [id number]    Implantable Lead Implant Date 20060306    Implantable Lead Polarity Type Bipolar Lead    Implantable Lead Location Detail 1 UNKNOWN    Implantable Lead Location Right Atrium    Implantable Lead  [employer]onic    Implantable Lead Model 6949 Sprint Doron    Implantable Lead Serial Number [id number]    Implantable Lead Implant Date 20060306    Implantable Lead Polarity Type Bipolar Lead    Implantable Lead Location Detail 1 UNKNOWN    Implantable Lead Location Right Ventricle    Marcos Setting Mode (NBG Code) MVP_AAI_DDD    Marcos Setting Lower Rate Limit 50    Marcos Setting Maximum Tracking Rate 130    Marcos Setting Maximum Sensor Rate 115    Marcos Setting Hysterisis Rate DISABLED    Marcos Setting TRISTEN Delay Low 350    Marcos Setting PAV Delay Low 350    Marcos Setting AT Mode Switch Rate 171    Lead Channel Setting Sensing Polarity Bipolar    Lead Channel Setting Sensing Anode Location Right Atrium    Lead Channel Setting Sensing Anode Terminal Ring    Lead Channel Setting Sensing Cathode Location Right Atrium    Lead Channel Setting Sensing Cathode Terminal Tip    Lead Channel Setting Sensing Sensitivity 0.3    Lead Channel Setting Sensing Polarity Bipolar    Lead Channel Setting  Sensing Anode Location Right Ventricle    Lead Channel Setting Sensing Anode Terminal Ring    Lead Channel Setting Sensing Cathode Location Right Ventricle    Lead Channel Setting Sensing Cathode Terminal Tip    Lead Channel Setting Sensing Sensitivity 0.3    Lead Channel Setting Pacing Polarity Bipolar    Lead Channel Setting Pacing Anode Location Right Atrium    Lead Channel Setting Pacing Anode Terminal Ring    Lead Channel Setting Sensing Cathode Location Right Atrium    Lead Channel Setting Sensing Cathode Terminal Tip    Lead Channel Setting Pacing Pulse Width 0.4    Lead Channel Setting Pacing Amplitude 1.5    Lead Channel Setting Pacing Capture Mode Adaptive    Lead Channel Setting Pacing Polarity Bipolar    Lead Channel Setting Pacing Anode Location Right Ventricle    Lead Channel Setting Pacing Anode Terminal Ring    Lead Channel Setting Sensing Cathode Location Right Ventricle    Lead Channel Setting Sensing Cathode Terminal Tip    Lead Channel Setting Pacing Pulse Width 1    Lead Channel Setting Pacing Amplitude 5    Lead Channel Setting Pacing Capture Mode Adaptive    Zone Setting Type Category VF    Zone Setting Detection Interval 320    Zone Setting Detection Beats Numerator 30    Zone Setting Detection Beats Denominator 40    Zone Setting Type Category VT    Zone Setting Detection Interval 280    Zone Setting Type Category VT    Zone Setting Detection Interval 350    Zone Setting Type Category VT    Zone Setting Detection Interval 400    Zone Setting Type Category ATRIAL_FIBRILLATION    Zone Setting Type Category AT/AF    Zone Setting Detection Interval 350    Lead Channel Impedance Value 285    Lead Channel Sensing Intrinsic Amplitude 0.625    Lead Channel Pacing Threshold Amplitude 0.75    Lead Channel Pacing Threshold Pulse Width 0.4    Lead Channel Impedance Value 665    Lead Channel Impedance Value 551    Lead Channel Sensing Intrinsic Amplitude 0.75    Lead Channel Pacing Threshold Amplitude  2.75    Lead Channel Pacing Threshold Pulse Width 1    Battery Date Time of Measurements [id number]    Battery Status OK    Battery RRT Trigger 2.727    Battery Remaining Longevity 63    Battery Voltage 2.94    Capacitor Charge Type Reformation    Capacitor Last Charge Date Time 29411220754306    Capacitor Charge Time 3.973    Capacitor Charge Energy 18    Marcos Statistic Date Time Start 70571482309615    Marcos Statistic Date Time End 38400849974088    Marcos Statistic RA Percent Paced 0    Marcos Statistic RV Percent Paced 0    Marcos Statistic AP  Percent 0    Marcos Statistic AS  Percent 0    Marcos Statistic AP VS Percent 0    Marcos Statistic AS VS Percent 100    Atrial Tachy Statistic Date Time Start 88544238962138    Atrial Tachy Statistic Date Time End 42135864676886    Atrial Tachy Statistic AT/AF [place] Percent 0    Therapy Statistic Recent Shocks Delivered 0    Therapy Statistic Recent Shocks Aborted 0    Therapy Statistic Recent ATP Delivered 0    Therapy Statistic Recent Date Time Start 01168105657344    Therapy Statistic Recent Date Time End 02818591392141    Therapy Statistic Total Shocks Delivered 1    Therapy Statistic Total Shocks Aborted 0    Therapy Statistic Total ATP Delivered 0    Therapy Statistic Total  Date Time Start 65718572237095    Therapy Statistic Total  Date Time End 30712993619073    Episode Statistic Recent Count 0    Episode Statistic Type Category AT/AF    Episode Statistic Recent Count 0    Episode Statistic Type Category SVT    Episode Statistic Recent Count 0    Episode Statistic Type Category VT    Episode Statistic Recent Count 0    Episode Statistic Type Category VF    Episode Statistic Recent Count 0    Episode Statistic Type Category VT    Episode Statistic Recent Count 0    Episode Statistic Type Category VT    Episode Statistic Recent Count 0    Episode Statistic Type Category VT    Episode Statistic Recent Date Time Start 54334182781337    Episode Statistic Recent  Date Time End 57460959185871    Episode Statistic Recent Date Time Start 48420054372331    Episode Statistic Recent Date Time End 87105425181942    Episode Statistic Recent Date Time Start 68125075137740    Episode Statistic Recent Date Time End 68586413474427    Episode Statistic Recent Date Time Start 91760236035124    Episode Statistic Recent Date Time End 36595376779330    Episode Statistic Recent Date Time Start 88549845359850    Episode Statistic Recent Date Time End 14933348823856    Episode Statistic Recent Date Time Start 80974688927088    Episode Statistic Recent Date Time End 01497177242879    Episode Statistic Recent Date Time Start 72019291370086    Episode Statistic Recent Date Time End 36520188288931    Episode Statistic Total Count 33    Episode Statistic Type Category AT/AF    Episode Statistic Total Count 0    Episode Statistic Type Category SVT    Episode Statistic Total Count 9    Episode Statistic Type Category VT    Episode Statistic Total Count 1    Episode Statistic Type Category VF    Episode Statistic Total Count 0    Episode Statistic Type Category VT    Episode Statistic Total Count 0    Episode Statistic Type Category VT    Episode Statistic Total Count 1    Episode Statistic Type Category VT    Episode Statistic Total Date Time Start 83076263376660    Episode Statistic Total Date Time End 56083485343302    Episode Statistic Total Date Time Start 44837381026606    Episode Statistic Total Date Time End 16173712797223    Episode Statistic Total Date Time Start 36087054089993    Episode Statistic Total Date Time End 38212524119995    Episode Statistic Total Date Time Start 58723317119222    Episode Statistic Total Date Time End 60649560715684    Episode Statistic Total Date Time Start 75274059030722    Episode Statistic Total Date Time End 35728202951417    Episode Statistic Total Date Time Start 36560588174387    Episode Statistic Total Date Time End 09651639357647    Episode Statistic  Total Date Time Start 25241076918844    Episode Statistic Total Date Time End 45036449885376    Narrative    Patient seen on [facility] 4E for evaluation and iterative programming of their ICD per MD orders, post-op chest washout and closure.     Device: iCopyright [id number] Evera XT   Normal device function. R Waves have drastically changed, today measuring 0.8 mV. RV threshold has also risen to 2.75 V @ 1 ms. RV impedance shows an increase in unipolar and unipolar.  Mode: AAI-DDD  bpm  AP: 0%  : 0%  Intrinsic Rhythm:  bpm  Short V-V intervals: 0  Lead Trends Appear Stable.   Estimated battery longevity to RRT = 5.2 years. Battery voltage = 2.95 V.   No Arrhythmias Recorded as Detection was ooff for surgery.  Setting Changes: ICD Tachy Detection and Therapies Turned ON.    Plan: Follow pt PRN while hospitalized  JESU Cueva RN    Dual lead ICD    I have reviewed and interpreted the device interrogation, settings, programming and nurse's summary. The device is functioning within normal device parameters. I agree with the current findings, assessment and plan.         6. LINES/DRAINS/AIRWAY    Peripheral IV 07/05/22 Left;Anterior Upper forearm (Active)   Site Assessment WDL 07/16/22 0800   Line Status Saline locked 07/16/22 0800   Dressing Intervention New dressing  07/12/22 0000   Phlebitis Scale 0-->no symptoms 07/16/22 0800   Infiltration Scale 0 07/16/22 0800   If infiltrated, was a vesicant infusing? No 07/11/22 1800   Number of days: 11       PICC Triple Lumen 06/17/22 Right Brachial vein medial (Active)   Site Assessment WDL 07/16/22 0800   Dressing Intervention Chlorhexidine patch;Transparent 07/16/22 0800   Dressing Change Due 07/17/22 07/16/22 0400   PICC Comment from OSH 06/17/22 1700   [place] - Status infusing 07/16/22 0800   [place]borne - Cap Change Due 07/19/22 07/16/22 0400   Red - Status infusing 07/16/22 0800   Red - Cap Change Due 07/19/22 07/16/22 0400   White - Status infusing 07/16/22 0800   White  - Cap Change Due 07/19/22 07/16/22 0400   Extravasation? No 07/14/22 2000   Line Necessity Yes, meets criteria 07/16/22 0800   Number of days: 29       Introducer 07/08/22 Femoral Left (Active)   Specific Qualities Capped 07/16/22 0800   (Retired) Dressing Status Clean, dry, intact 07/16/22 0800   Dressing Intervention Dressing changed 07/10/22 0000   Dressing Change Due 07/17/22 07/16/22 0400   Number of days: 8       Arterial Line 07/14/22 Radial (Active)   Site Assessment WDL 07/16/22 0800   Line Status Pulsatile blood flow 07/16/22 0800   Arterine Line Cap Change Due 07/19/22 07/15/22 0400   Art Line Waveform Appropriate;Square wave test performed 07/16/22 0800   Art Line Interventions Leveled;Connections checked and tightened 07/16/22 0800   Color/Movement/Sensation Capillary refill less than 3 sec 07/16/22 0800   Line Necessity Yes, meets criteria 07/16/22 0800   Dressing Type Transparent 07/16/22 0800   Dressing Status Clean, dry, intact 07/16/22 0800   Dressing Change Due 07/17/22 07/16/22 0400   Number of days: 2       Venous Sheath 07/08/22 Other (comment) Right (Active)   Specific Qualities Sutured 07/16/22 0800   Site Assessment UTV 07/16/22 0800   Dressing Type Transparent 07/16/22 0800   Dressing Intervention Dressing reinforced 07/13/22 0400   Venous Sheath Comment Protec Duo 07/16/22 0800   Number of days: 8       CVC Double Lumen Left Femoral (Active)   Site Assessment WDL 07/16/22 0800   Dressing Type Chlorhexidine disk;Transparent 07/16/22 0800   Dressing Status clean;dry;intact 07/16/22 0800   Dressing Intervention dressing changed 07/10/22 0000   Dressing Change Due 07/17/22 07/16/22 0400   Line Necessity yes, meets criteria 07/16/22 0800   Brown - Status saline locked 07/16/22 0800   Brown - Cap Change Due 07/19/22 07/16/22 0400   White - Status saline locked 07/16/22 0800   White - Cap Change Due 07/19/22 07/16/22 0400   Phlebitis Scale 0-->no symptoms 07/16/22 0800   Infiltration? no 07/16/22  0800   Infiltration Scale 0 07/14/22 1600   CVC Comment MAC 07/16/22 0800   Number of days: 8       ETT Cuffed Single 8 mm (Active)   Secured at (cm) 25 cm 07/16/22 1224   Measured from Lips 07/16/22 1224   Position Center 07/16/22 1224   Secured by Commercial tube partida;Other (Comment) 07/16/22 1224   Bite Block None Present 07/15/22 0016   Site Appearance Clean;Dry 07/16/22 0400   Tube Care Site care done 07/15/22 1600   Cuff Assessment Minimal occluding volume 07/16/22 1224   Safety Measures Manual resuscitator/mask/valve in room 07/16/22 1224   Number of days: 8       Chest Tube 1 Anterior Mediastinal 9 Armenian (Active)   Site Assessment UTV 07/16/22 1200   Suction -20 cm H2O 07/16/22 1200   Chest Tube Airleak No 07/16/22 1200   Drainage Description Serosanguinous 07/16/22 1200   Dressing Status Normal: Clean, Dry & Intact 07/16/22 1200   Dressing Change Due 07/17/22 07/16/22 0400   Dressing Intervention Gauze 07/16/22 0400   Patency Intervention Tip/Tilt 07/16/22 1200   Chest Tube Clamps at Bedside present 07/16/22 1200   Container Amount 680 07/16/22 1200   Output (ml) 10 ml 07/16/22 1200   Number of days: 8       Chest Tube 2 Anterior Mediastinal 9 Armenian (Active)   Site Assessment UTV 07/16/22 1200   Suction -20 cm H2O 07/16/22 1200   Chest Tube Airleak No 07/16/22 1200   Drainage Description Serosanguinous 07/16/22 1200   Dressing Status Normal: Clean, Dry & Intact 07/16/22 1200   Dressing Change Due 07/17/22 07/16/22 0400   Dressing Intervention Transparent 07/16/22 1200   Patency Intervention Tip/Tilt 07/16/22 1200   Chest Tube Clamps at Bedside present 07/16/22 1200   Number of days: 8       Chest Tube 3 Pleural 32 Armenian (Active)   Site Assessment UTV 07/16/22 1200   Suction -20 cm H2O 07/16/22 1200   Chest Tube Airleak No 07/16/22 1200   Drainage Description Serosanguinous 07/16/22 1200   Dressing Status Normal: Clean, Dry & Intact 07/16/22 1200   Dressing Change Due 07/17/22 07/16/22 0400   Dressing  Intervention Gauze 07/16/22 0400   Patency Intervention Tip/Tilt 07/16/22 1200   Chest Tube Clamps at Bedside present 07/16/22 1200   Container Amount 1950 07/16/22 1200   Output (ml) 40 ml 07/16/22 1200   Number of days: 8       Chest Tube 4 Posterior Pericardial 24 [place] (Active)   Site Assessment UTV 07/16/22 1200   Suction -20 cm H2O 07/16/22 1200   Chest Tube Airleak No 07/16/22 1200   Drainage Description Serosanguinous 07/16/22 1200   Dressing Status Normal: Clean, Dry & Intact 07/16/22 1200   Dressing Change Due 07/17/22 07/16/22 0400   Dressing Intervention Gauze 07/15/22 0000   Patency Intervention Tip/Tilt 07/16/22 1200   Chest Tube Clamps at Bedside present 07/16/22 1200   Number of days: 8       Negative Pressure Wound Therapy Sternum Upper (Active)   Wound Type Surgical 07/16/22 1200   Unit Type Vac Ulta 07/16/22 1200   Target Pressure (mmHg) 125 07/16/22 1200   [place] changed? No 07/16/22 1200   Output (ml) 0 ml 07/16/22 1200   Number of days: 3       NG/OG/NJ Tube Nasojejunal Right nostril (Active)   Site Description [facility] 07/16/22 1200   Status Enteral Feedings 07/16/22 1200   Placement Confirmation [place] unchanged 07/16/22 1200   [place] (cm marking) at nare/mouth 113 cm 07/16/22 1200   Intake (ml) 40 ml 07/16/22 0800   Flush/Free Water (mL) 30 mL 07/16/22 1200   Number of days: 5       NG/OG/NJ Tube Orogastric Right mouth (Active)   Site Description [facility] 07/16/22 1200   Status Suction-low intermittent 07/16/22 1200   Drainage Appearance Bile;Brown 07/16/22 1200   Placement Confirmation [place] unchanged 07/16/22 1200   [place] (cm marking) at nare/mouth 64 cm 07/16/22 1200   Flush/Free Water (mL) 30 mL 07/16/22 0800   Container Amount 600 mL 07/16/22 1200   Output (ml) 0 ml 07/16/22 1200   Number of days: 4       Rectal Tube (Active)   Balloon fill volume  40 cc 07/16/22 1200   Stool Leakage Small 07/16/22 1200   Rectal Tube Container Volume 200 ml 07/16/22 1200   Rectal Tube Output 150 ml  07/16/22 1200   Number of days: 2       Urethral Catheter 07/08/22 Coude;Latex;Temperature probe;Triple-lumen 16 fr (Active)   Tube Description Positional 07/16/22 1200   Catheter Care Done;Catheter wipes 07/16/22 1200   Collection Container Standard;Patent 07/16/22 1200   Securement Method Securing device (Describe) 07/16/22 1200   Rationale for Continued Use Strict 1-2 Hour I&O 07/16/22 1200   Urine Output 55 mL 07/16/22 1300   Number of days: 8       Wound Face Pressure injury hospital acquired Stage 1 (Active)   Wound Bed Erythema, non-blanchable, skin intact 07/15/22 2000   Estimated Circumference ( if not measured quarter sized 07/14/22 1200   Drainage Amount None 07/15/22 2000   Dressing Foam 07/15/22 2000   Dressing Status Clean, dry, intact 07/15/22 2000   Number of days: 3       Incision/Surgical Site 06/23/22 Right Chest (Active)   Incision Assessment WDL 07/16/22 0400   Deirdre-Incision Assessment UTV 07/15/22 0800   Closure Liquid bandage;Approximated 07/13/22 0400   Incision Drainage Amount UTV 07/16/22 0400   Drainage Description UTV 07/16/22 0400   Incision Care Wound cleanser 07/13/22 0400   Dressing Intervention Clean, dry, intact 07/16/22 0400   Number of days: 23       Incision/Surgical Site 07/08/22 Midline Chest (Active)   Incision Assessment UTV 07/16/22 0400   Deirdre-Incision Assessment UTV 07/15/22 0800   Closure Other (Comment) 07/13/22 1600   Incision Drainage Amount Scant 07/11/22 1200   Drainage Description Sanguinous 07/11/22 0400   Incision Care Therapy - negative pressure 07/16/22 0400   Dressing Intervention Clean, dry, intact 07/16/22 0400   Number of days: 8       Incision/Surgical Site 07/13/22 Chest (Active)   Number of days: 3       Incision/Surgical Site 07/13/22 Bilateral Chest (Active)   Closure Sutures 07/13/22 1805   Number of days: 3       Incision/Surgical Site 07/13/22 Medial Sternum (Active)   Incision Assessment UTV 07/16/22 0400   Deirdre-Incision Assessment Pale 07/14/22  0400   Closure Other (Comment) 07/14/22 0400   Incision Drainage Amount None 07/14/22 1600   Incision Care Therapy - negative pressure 07/16/22 0400   Dressing Intervention Clean, dry, intact 07/16/22 0400   Number of days: 3          ====================================================          CV ICU PROGRESS NOTE  07/16/2022        CO-MORBIDITIES  5. Cardiogenic shock (H)  (primary encounter diagnosis)  6. Ischemic cardiomyopathy  7. Heart failure with reduced ejection fraction, NYHA class III (H)  8. Coronary artery disease involving native coronary artery of native heart without angina pectoris      ASSESSMENT Dandy Sands is a 61 year old male with a PMH of CAD s/p PCI to LAD, MI, ICM, acute on chronic heart failure, s/p CRT-D, severe MR, antiphospholipid antibody syndrome on chronic warfarin, SLE, HTN, HLD, recent hospitalization 5/16-5/26 s/p RCA, LAD stenting who underwent RVAD (29F Protek duo RIJ) LVAD placement (HeartMate 3) on 7/8/22 by Dr. Zamora. Intraoperative course complicated by IABP dysfunction and RV failure, requiring crash onto bypass / vasopressor support, NO, and protek placement. Now s/p chest closure and NO wean 7/11. Return to OR for hemopericardium (7/12) and chest re-closure 7/13.    Changes 7/15:  - Minimal vent settings, PST w/ precedex for agitation and tachypnea  - Rass -1 to -2  - AC plan: straight rate heparin 400U/hr  - Begin empiric Zosyn for 7/12 sputum positive (4+ candida, 1+ ecoli, 3+ enterobacter )  - Consider diuresis if concern for volume up mid day   -       PLAN:   Neuro/ pain/ sedation:  #Acute Postoperative pain  #Depression   #Anxiety due to a medical condition  #Insomnia  - Monitor neurological status. Notify the MD for any acute changes in exam.  - Pain: fentanyl gtt. Scheduled tylenol. PRN tylenol, oxycodone, dilaudid.  - Sedation: propofol gtt, RASS -1 to -2  - Duloxetine 30mg  - Holding PTA meds: hydroxyzine 25mg PRN, zolpidem 5mg, melatonin 3mg, lorazepam  0.5mg  - Dental implant dislodged, consult     Pulmonary:   #Acute hypoxic respiratory failure on iMV  #Mild-moderate emphysema  #History of COVID-19  - Off nitric   - Mechanical ventilation, wean  - Repeat PST w/ Precedex     Cardiovascular:    #S/p LVAD placement (HeartMate 3) on 22   #S/p RVAD (29F Protek duo RIJ) on 22  #S/p chest closure   #Cardiogenic shock  #Advanced ischemic cardiomyopathy, class IV, stage D  #CAD s/p PCI to LAD ()  #S/p CRT-D ()  #History of MI ()  #Severe MR  #Antiphospholipid antibody syndrome on chronic warfarin  #HTN, HLD  #Recent hospitalization - s/p RCA, LAD stenting  #Atrial fibrillation   - Monitor hemodynamic status. Chest closure and oxygenator splicing . Reopened  for I&D and left open, plan for closure .  - Goal MAP > 65   - PTA meds: atorvastatin 40mg, clopidogrel 75mg, lasix 40mg, warfarin 5mg  - LVAD management per [facility] service   - flows stable, circuit functioning w/o clots/fibrin  - AC Plan: straight rate 400U/hr   - Hydralazine 10gm q8h  - Hydralazine prn MAP >90  - Discontinued amiodarone no longer in afib      GI / Nutrition:   #GERD  #Congestive hepatopathy in the setting of cardiac insuffiency  - NPO 2/2 intubation   - Titrate NJT to goal  - Bowel regimen (miralax / senna)  - Nutrition consulted.  - Trend LFT's    Hematemesis / oral mucosal bleedin/12 x2, 7/13 x1 (oral, around ETT/ETT clear).   - continue PPI BID x 72 hours (7/15 change after 12:00 noon)   - hgb stable   - OG without patience bloody output     Renal/ Fluid Balance:    - Strict I/O, daily weights   - Avoid/limit nephrotoxins as able  - Replete lytes PRN per protocol  - PTA meds: tamsulosin 0.4mg (held)  - Fluid goal net even      Endocrine:    #Stress induced hyperglycemia  Preop A1c 5.5%  - Continue Insulin gtt perioperatively   - Goal BG <180 for optimal healing     ID/ Antibiotics:  #Periopearive antibiosis (s/p course of Cefepime,  vancomycin, rifampin, fluconazole)  - S/P Cefepime and Vancomycin (7/13-7/14), perioperative antibiotics    - Trend fever curve   - Pan culture (7/12): Bcx x2, UA/UC -> negative    - Positive 7/14 Endotracheal sputum culture (4+ candida albicans, 1+ E.Coli, 3+ enterococcus faecalis)  - Zosyn, 7 day course (7/15 - 7/22), pending sensitivities     Heme:     #Acute blood loss anemia  #Acute blood loss thrombocytopenia  #History of DVT and PE   #Antiphospholipid antibody syndrome  #History of iron deficiency anemia  - Monitor for s/sx of bleeding  - Trend CBC and coags.  - Hgb goal > 8  - Plt goal > 20  - repeat hgb 2pm    Rheumatology:  #SLE  - Chronic, symptoms include arthritis, photosensitivity, fatigue, and skin manifestations  - PTA meds: hydroxychloroquine 200mg, hold 1 week until post repeat chest closure 7/13    Prophylaxis:    - DVT prophylaxis: SCD only   - PPI  - Bowel regimen  - PT/OT    Lines / Tubes / Drains:  - R brachial triple lumen PICC  - L upper arm PIV  - Left radial A-line       Disposition:  - CV ICU.        ====================================================    Patient seen, findings and plan discussed with CV ICU staff, Dr. Samano.      Rashadr Stephenson MD  Anesthesiology, PGY3    ====================================================        SUBJECTIVE  - No acute events overnight  - Patient intubated and sedated  - PST unsuccessful yesterday 2/2 tachypnea and agitation   - 1u pRBC  - Afib since evening 7/14 -> SR in AM     OBJECTIVE    1. VITAL SIGNS    Temp:  [97.3  F (36.3  C)-99.1  F (37.3  C)] 97.9  F (36.6  C)  Pulse:  [] 105  Resp:  [12-30] 25  BP: (110)/(91) 110/91  MAP:  [62 mmHg-107 mmHg] 95 mmHg  Arterial Line BP: ()/(52-96) 103/84  FiO2 (%):  [35 %] 35 %  SpO2:  [96 %-100 %] 100 %  Vent Mode: (S) CMV/AC  (Continuous Mandatory Ventilation/ Assist Control)  FiO2 (%): 35 %  Resp Rate (Set): 12 breaths/min  Tidal Volume (Set, mL): 450 mL  PEEP (cm H2O): 5 cmH2O  Pressure Support  (cm H2O): 7 cmH2O  Resp: 25        2. INTAKE/ OUTPUT    I/O last 3 completed shifts:  In: 3518.89 [I.V.:1658.89; NG/GT:360]  Out: 2530 [Urine:1690; Emesis/NG output:50; Stool:400; Chest Tube:390]      3. PHYSICAL EXAMINATION    General: sedated  Neuro: RAAS -2   Resp: intubated  CV: SR ~100s  Abdomen: Soft, Non-distended  Skin/Incisions: chest closed, right internal jugular cannulation, left femoral CVC/arterial line.  Extremities: warm and well perfused. LUE ecchymosis, unchanged      4. INVESTIGATIONS    Arterial Blood Gases   Recent Labs   Lab 07/16/22  1211 07/16/22  0814 07/16/22  0453 07/16/22  0141   PH 7.44 7.44 7.46* 7.42   PCO2 34* 34* 32* 35   PO2 138* 126* 122* 135*   HCO3 23 23 23 23     Complete Blood Count   Recent Labs   Lab 07/16/22  0944 07/16/22  0452 07/15/22  2200 07/15/22  1808   WBC 17.3* 19.6* 16.8* 21.3*   HGB 7.6* 8.4* 6.7* 7.4*   * 131* 119* 142*     Basic Metabolic Panel  Recent Labs   Lab 07/16/22  1114 07/16/22  0944 07/16/22  0821 07/16/22  0452 07/15/22  2206 07/15/22  1556 07/15/22  1547   NA  --  138  --  137 135*  --  135*   POTASSIUM  --  3.9  --  4.5 4.3  --  4.4   CHLORIDE  --  107  --  107 106  --  104   CO2  --  22  --  20* 21*  --  21*   BUN  --  31.3*  --  29.1* 28.9*  --  27.2*   CR  --  0.68  --  0.67 0.70  --  0.62*   * 157* 160* 169* 138*   < > 208*    < > = values in this interval not displayed.     Liver Function Tests  Recent Labs   Lab 07/16/22  0944 07/16/22  0452 07/15/22  2200 07/15/22  1547 07/15/22  1027 07/15/22  0345 07/14/22  0953 07/14/22  0337 07/13/22  0948 07/13/22  0354   ALT  --  17  --   --   --  20  --  23  --  25   ALBUMIN  --  2.4*  --   --   --  2.3*  --  2.3*  --  2.9*   INR 1.15 1.12 1.22* 1.23*   < > 1.23*   < > 1.21*   < > 1.18*    < > = values in this interval not displayed.     Pancreatic Enzymes  No lab results found in last 7 days.  Coagulation Profile  Recent Labs   Lab 07/16/22  0944 07/16/22  0452 07/15/22  6370  07/15/22  1547   INR 1.15 1.12 1.22* 1.23*   * 143* 164* 141*         5. RADIOLOGY    Recent Results (from the past 24 hour(s))   XR Feeding Tube Placement    Narrative    FEEDING TUBE PLACEMENT 7/11/2022 2:00 PM    INDICATION: Nutritional needs, open chest     COMPARISON: None.    FLUOROSCOPY TIME: 2.05 minutes    FINDINGS: The feeding tube was advanced under fluoroscopic guidance  with final position of tip in the second portion of the duodenum. A  small amount of barium was injected to demonstrate placement within  the small bowel. The tube was flushed with sterile water and secured  via nasal bridle. There were no complications of the procedure.   Partially visualized chest tubes/drains and IVC filter.      Impression    IMPRESSION: Uncomplicated feeding tube placement with tip in the  second portion of the duodenum.    I, JSOE M OSHEA MD, attest that I was present for all critical  portions of the procedure and was immediately available to provide  guidance and assistance during the remainder of the procedure.    I have personally reviewed the examination and initial interpretation  and I agree with the findings.    JOSE M OSHEA MD         SYSTEM ID:  [id number]   Cardiac Device Check - Inpatient   Result Value    Date Time Interrogation Session [id number]    Implantable Pulse Generator  Medtronic    Implantable Pulse Generator Model [id number] Eliseo TIRADO DR    Implantable Pulse Generator Serial Number [id number]    Type Interrogation Session In Clinic    Clinic Name [facility]    Implantable Pulse Generator Type Defibrillator    Implantable Pulse Generator Implant Date 20180412    Implantable Lead  Medtronic    Implantable Lead Model 5076 CapSureFix Novus    Implantable Lead Serial Number [id number]    Implantable Lead Implant Date 20060306    Implantable Lead Polarity Type Bipolar Lead    Implantable Lead Location Detail 1 UNKNOWN    Implantable Lead  Location Right Atrium    Implantable Lead  Medtronic    Implantable Lead Model 6949 Fidel Sal    Implantable Lead Serial Number [id number]    Implantable Lead Implant Date 20060306    Implantable Lead Polarity Type Bipolar Lead    Implantable Lead Location Detail 1 UNKNOWN    Implantable Lead Location Right Ventricle    Marcos Setting Mode (NBG Code) MVP_AAI_DDD    Marcos Setting Lower Rate Limit 50    Marcos Setting Maximum Tracking Rate 130    Marcos Setting Maximum Sensor Rate 115    Marcos Setting Hysterisis Rate DISABLED    Marcos Setting TRISTEN Delay Low 350    Marcos Setting PAV Delay Low 350    Marcos Setting AT Mode Switch Rate 171    Lead Channel Setting Sensing Polarity Bipolar    Lead Channel Setting Sensing Anode Location Right Atrium    Lead Channel Setting Sensing Anode Terminal Ring    Lead Channel Setting Sensing Cathode Location Right Atrium    Lead Channel Setting Sensing Cathode Terminal Tip    Lead Channel Setting Sensing Sensitivity 0.3    Lead Channel Setting Sensing Polarity Bipolar    Lead Channel Setting Sensing Anode Location Right Ventricle    Lead Channel Setting Sensing Anode Terminal Ring    Lead Channel Setting Sensing Cathode Location Right Ventricle    Lead Channel Setting Sensing Cathode Terminal Tip    Lead Channel Setting Sensing Sensitivity 0.3    Lead Channel Setting Pacing Polarity Bipolar    Lead Channel Setting Pacing Anode Location Right Atrium    Lead Channel Setting Pacing Anode Terminal Ring    Lead Channel Setting Sensing Cathode Location Right Atrium    Lead Channel Setting Sensing Cathode Terminal Tip    Lead Channel Setting Pacing Pulse Width 0.4    Lead Channel Setting Pacing Amplitude 1.5    Lead Channel Setting Pacing Capture Mode Adaptive    Lead Channel Setting Pacing Polarity Bipolar    Lead Channel Setting Pacing Anode Location Right Ventricle    Lead Channel Setting Pacing Anode Terminal Ring    Lead Channel Setting Sensing Cathode Location Right  Ventricle    Lead Channel Setting Sensing Cathode Terminal Tip    Lead Channel Setting Pacing Pulse Width 1    Lead Channel Setting Pacing Amplitude 5    Lead Channel Setting Pacing Capture Mode Adaptive    Zone Setting Type Category VF    Zone Setting Detection Interval 320    Zone Setting Detection Beats Numerator 30    Zone Setting Detection Beats Denominator 40    Zone Setting Type Category VT    Zone Setting Detection Interval 280    Zone Setting Type Category VT    Zone Setting Detection Interval 350    Zone Setting Type Category VT    Zone Setting Detection Interval 400    Zone Setting Type Category ATRIAL_FIBRILLATION    Zone Setting Type Category AT/AF    Zone Setting Detection Interval 350    Lead Channel Impedance Value 304    Lead Channel Sensing Intrinsic Amplitude 0.875    Lead Channel Sensing Intrinsic Amplitude 1.5    Lead Channel Pacing Threshold Amplitude 0.75    Lead Channel Pacing Threshold Pulse Width 0.4    Lead Channel Impedance Value 703    Lead Channel Impedance Value 608    Lead Channel Sensing Intrinsic Amplitude 0.875    Lead Channel Sensing Intrinsic Amplitude 1.125    Lead Channel Pacing Threshold Amplitude 2.5    Lead Channel Pacing Threshold Pulse Width 0.4    Battery Date Time of Measurements [id number]    Battery Status OK    Battery RRT Trigger 2.727    Battery Remaining Longevity 63    Battery Voltage 2.96    Capacitor Charge Type Reformation    Capacitor Last Charge Date Time [id number]    Capacitor Charge Time 3.973    Capacitor Charge Energy 18    Marcos Statistic Date Time Start 08216153383992    Marcos Statistic Date Time End 02770865678449    Marcos Statistic RA Percent Paced 0.1    Marcos Statistic RV Percent Paced 0.1    Marcos Statistic AP  Percent 0.02    Marcos Statistic AS  Percent 0.08    Marcos Statistic AP VS Percent 0.08    Marcos Statistic AS VS Percent 99.82    Atrial Tachy Statistic Date Time Start 40756880573930    Atrial Tachy Statistic Date Time End  92421217674196    Atrial Tachy Statistic AT/AF [place] Percent 3.1    Therapy Statistic Recent Shocks Delivered 0    Therapy Statistic Recent Shocks Aborted 0    Therapy Statistic Recent ATP Delivered 0    Therapy Statistic Recent Date Time Start 93632947329784    Therapy Statistic Recent Date Time End 36575275766600    Therapy Statistic Total Shocks Delivered 1    Therapy Statistic Total Shocks Aborted 0    Therapy Statistic Total ATP Delivered 0    Therapy Statistic Total  Date Time Start 24749754236752    Therapy Statistic Total  Date Time End 24262495763240    Episode Statistic Recent Count 28    Episode Statistic Type Category AT/AF    Episode Statistic Recent Count 0    Episode Statistic Type Category SVT    Episode Statistic Recent Count 0    Episode Statistic Type Category VT    Episode Statistic Recent Count 0    Episode Statistic Type Category VF    Episode Statistic Recent Count 0    Episode Statistic Type Category VT    Episode Statistic Recent Count 0    Episode Statistic Type Category VT    Episode Statistic Recent Count 0    Episode Statistic Type Category VT    Episode Statistic Recent Date Time Start 16243832713099    Episode Statistic Recent Date Time End 53942972517314    Episode Statistic Recent Date Time Start 13202353690553    Episode Statistic Recent Date Time End 02121148062542    Episode Statistic Recent Date Time Start 51872517854642    Episode Statistic Recent Date Time End 72057874735295    Episode Statistic Recent Date Time Start 97026275412362    Episode Statistic Recent Date Time End 33697980263614    Episode Statistic Recent Date Time Start 67999918391471    Episode Statistic Recent Date Time End 45970849947094    Episode Statistic Recent Date Time Start 15447290708740    Episode Statistic Recent Date Time End 77500991902348    Episode Statistic Recent Date Time Start 17267659399274    Episode Statistic Recent Date Time End 21966062693878    Episode Statistic Total Count 33    Episode  Statistic Type Category AT/AF    Episode Statistic Total Count 0    Episode Statistic Type Category SVT    Episode Statistic Total Count 9    Episode Statistic Type Category VT    Episode Statistic Total Count 1    Episode Statistic Type Category VF    Episode Statistic Total Count 0    Episode Statistic Type Category VT    Episode Statistic Total Count 0    Episode Statistic Type Category VT    Episode Statistic Total Count 1    Episode Statistic Type Category VT    Episode Statistic Total Date Time Start 92619243672337    Episode Statistic Total Date Time End 72958630621072    Episode Statistic Total Date Time Start 02101280779395    Episode Statistic Total Date Time End 92983122847236    Episode Statistic Total Date Time Start 87292830009199    Episode Statistic Total Date Time End 54174238893717    Episode Statistic Total Date Time Start 13481102708038    Episode Statistic Total Date Time End 16891530853998    Episode Statistic Total Date Time Start 88175818038643    Episode Statistic Total Date Time End 46046881618321    Episode Statistic Total Date Time Start 85430309656374    Episode Statistic Total Date Time End 07210968524101    Episode Statistic Total Date Time Start 01048010833135    Episode Statistic Total Date Time End 16433194457795    Episode Identifier 56    Episode Type Category Monitor    Episode Date Time [id number]    Episode Duration 61    Episode Identifier 55    Episode Type Category Monitor    Episode Date Time 11295187582848    Episode Duration 144    Episode Identifier 54    Episode Type Category Monitor    Episode Date Time 85558010085047    Episode Duration 93    Episode Identifier 53    Episode Type Category Monitor    Episode Date Time 47080253883044    Episode Duration 214    Episode Identifier 52    Episode Type Category Monitor    Episode Date Time 21080608517332    Episode Duration 309    Episode Identifier 51    Episode Type Category Monitor    Episode Date Time 49333387744907     Episode Duration 316    Episode Identifier 50    Episode Type Category Monitor    Episode Date Time [id number]    Episode Duration 180    Episode Identifier 49    Episode Type Category Monitor    Episode Date Time 00130443249551    Episode Duration 597    Episode Identifier 48    Episode Type Category Monitor    Episode Date Time [id number]    Episode Duration 102    Episode Identifier 47    Episode Type Category Monitor    Episode Date Time 40258219042830    Episode Duration 970    Episode Identifier 46    Episode Type Category Monitor    Episode Date Time 26236381308130    Episode Duration 350    Episode Identifier 45    Episode Type Category Monitor    Episode Date Time 38108500980962    Episode Duration 351    Episode Identifier 44    Episode Type Category Monitor    Episode Date Time 17288498181899    Episode Duration 102    Episode Identifier 43    Episode Type Category Monitor    Episode Date Time [id number]    Episode Duration 353    Episode Identifier 42    Episode Type Category Monitor    Episode Date Time [id number]    Episode Duration 328    Episode Identifier 41    Episode Type Category Monitor    Episode Date Time [id number]    Episode Duration 152    Episode Identifier 40    Episode Type Category Monitor    Episode Date Time [id number]    Episode Duration 235    Episode Identifier 39    Episode Type Category Monitor    Episode Date Time 80614260384561    Episode Duration 296    Episode Identifier 38    Episode Type Category Monitor    Episode Date Time 42163739441743    Episode Duration 203    Episode Identifier 37    Episode Type Category Monitor    Episode Date Time [id number]    Episode Duration 182    Episode Identifier 36    Episode Type Category Monitor    Episode Date Time [id number]    Episode Duration 361    Episode Identifier 35    Episode Type Category Monitor    Episode Date Time [id number]    Episode Duration 297    Episode Identifier 34    Episode  Type Category Monitor    Episode Date Time 81484433925141    Episode Duration 99    Episode Identifier 33    Episode Type Category Monitor    Episode Date Time 13976252740271    Episode Duration 228    Episode Identifier 32    Episode Type Category Monitor    Episode Date Time [id number]    Episode Duration 278    Episode Identifier 31    Episode Type Category Monitor    Episode Date Time 10685036874879    Episode Duration 186    Episode Identifier 30    Episode Type Category Monitor    Episode Date Time [id number]    Episode Duration 378    Episode Identifier 29    Episode Type Category Monitor    Episode Date Time 20359913366846    Episode Duration 322    Narrative    Patient seen in OR #27 pre-operatively for evaluation and iterative programming of their ICD per MD orders.     Device: [employer] [place] [id number] Evera XT DR  Normal device function  Mode: AAI>DDD  bpm  AP: <0.1%  : <0.1%  Intrinsic rhythm:  bpm  Thoracic Impedance: Below the reference line suggesting possible intrathoracic fluid accumulation.  Short V-V intervals: 0  Lead Trends Appear Stable: yes  Estimated battery longevity to RRT = 5.2 years  Atrial Arrhythmia: 28 AT/AF episodes recorded - 1 min 1 sec - 16 min 10 sec.  AF [place]: 3.1%  Anticoagulant:   Ventricular Arrhythmia: 0  Setting Changes: VY detection, FVT detection and VT monitor zones programmed off.     TRINA Amador RN    Dual lead ICD    I have reviewed and interpreted the device interrogation, settings, programming and nurse's summary. The device is functioning within normal device parameters. I agree with the current findings, assessment and plan.   XR Chest Port 1 View    Narrative    EXAM: TEMPORARY  7/11/2022 5:37 PM     HISTORY:  Chest closure, intraoperative       COMPARISON:  7/11/2022    FINDINGS  Technique: Semiupright AP view of the chest.     Devices: Single view of the chest. The endotracheal tube tip projects  over the midthoracic trachea. Gastric tube passes  below the left  hemidiaphragm. Stable LVAD and left chest wall cardiac device with  atrial and ventricular leads. Right upper extremity PICC tip is  obscured in the region of the high SVC. Stable positioning of right IJ  RVAD with tip in the pulmonary artery. Mediastinal drains and left  basilar chest tube. Chest is open with surgical sponges projected over  the mediastinum.    Unchanged cardiomegaly. Small bilateral pleural effusions. No  discernible pneumothorax.       Impression    IMPRESSION:   No unexpected retained foreign body. Remaining support devices stable  in position.    Results reported to [facility] OR circalile 7/11/2022 1749 hours    I have personally reviewed the examination and initial interpretation  and I agree with the findings.    KASI EAST MD         SYSTEM ID:  [id number]   XR Abdomen Port 1 View    Narrative    XR ABDOMEN PORT 1 VIEWS  7/11/2022 6:56 PM      HISTORY: post chest closure and verification of lines    COMPARISON: 7/11/2022, 7/3/2022    FINDINGS:   Single AP view of the abdomen. Stable thoracic findings. Stable IVC  filter. Feeding tube tip overlying the proximal jejunum with residual  enteric contrast over the stomach. Relative paucity of bowel gas. No  definite pneumatosis or portal venous gas.      Impression    IMPRESSION:   Feeding tube tip overlying the proximal jejunum.    I have personally reviewed the examination and initial interpretation  and I agree with the findings.    KASI EAST MD         SYSTEM ID:  [id number]   XR Chest Port 1 View    Narrative    EXAM: XR CHEST PORT 1 VIEW  7/12/2022 1:15 AM     HISTORY:  RVAD/LVAD/ETT       COMPARISON:  7/11/2022    FINDINGS: Single view of the chest. Postsurgical changes of the chest  with median sternotomy wires. Endotracheal tube tip projects over the  mid thoracic trachea. Feeding tube courses below the diaphragm and  beyond the field-of-view. The tip is at least to the body of the  stomach. Right upper extremity  PICC tip is obscured in the region of  mid SVC. Right IJ RVAD with tip over the distal main pulmonary trunk.  Stable left chest wall cardiac device and LVAD. Stable mediastinal  drains and left basilar chest tube.     Stable enlarged cardiac silhouette. Small bilateral pleural effusions.  No appreciable pneumothorax. Similar perihilar and bibasilar  opacities.      Impression    IMPRESSION:   1. Stable support devices.  2. Stable small pleural effusions and bilateral pulmonary opacities.    I have personally reviewed the examination and initial interpretation  and I agree with the findings.    LAI HERNADEZ MD         SYSTEM ID:  [id number]   Cardiac Device Check - Inpatient   Result Value    Date Time Interrogation Session [id number]    Implantable Pulse Generator  Medtronic    Implantable Pulse Generator Model [id number] Evera XT     Implantable Pulse Generator Serial Number [id number]    Type Interrogation Session In Clinic    Clinic Name [facility]    Implantable Pulse Generator Type Defibrillator    Implantable Pulse Generator Implant Date 20180412    Implantable Lead  Medtronic    Implantable Lead Model 5076 CapSureFix Novus    Implantable Lead Serial Number [id number]    Implantable Lead Implant Date 20060306    Implantable Lead Polarity Type Bipolar Lead    Implantable Lead Location Detail 1 UNKNOWN    Implantable Lead Location Right Atrium    Implantable Lead  Medtronic    Implantable Lead Model 6949 [employer] Doron    Implantable Lead Serial Number [id number]    Implantable Lead Implant Date 20060306    Implantable Lead Polarity Type Bipolar Lead    Implantable Lead Location Detail 1 UNKNOWN    Implantable Lead Location Right Ventricle    Marcos Setting Mode (NBG Code) MVP_AAI_DDD    Marcos Setting Lower Rate Limit 50    Marcos Setting Maximum Tracking Rate 130    Marcos Setting Maximum Sensor Rate 115    Marcos Setting Hysterisis Rate DISABLED     Marcos Setting TRISTEN Delay Low 350    Marcos Setting PAV Delay Low 350    Marcos Setting AT Mode Switch Rate 171    Lead Channel Setting Sensing Polarity Bipolar    Lead Channel Setting Sensing Anode Location Right Atrium    Lead Channel Setting Sensing Anode Terminal Ring    Lead Channel Setting Sensing Cathode Location Right Atrium    Lead Channel Setting Sensing Cathode Terminal Tip    Lead Channel Setting Sensing Sensitivity 0.3    Lead Channel Setting Sensing Polarity Bipolar    Lead Channel Setting Sensing Anode Location Right Ventricle    Lead Channel Setting Sensing Anode Terminal Ring    Lead Channel Setting Sensing Cathode Location Right Ventricle    Lead Channel Setting Sensing Cathode Terminal Tip    Lead Channel Setting Sensing Sensitivity 0.3    Lead Channel Setting Pacing Polarity Bipolar    Lead Channel Setting Pacing Anode Location Right Atrium    Lead Channel Setting Pacing Anode Terminal Ring    Lead Channel Setting Sensing Cathode Location Right Atrium    Lead Channel Setting Sensing Cathode Terminal Tip    Lead Channel Setting Pacing Pulse Width 0.4    Lead Channel Setting Pacing Amplitude 1.5    Lead Channel Setting Pacing Capture Mode Adaptive    Lead Channel Setting Pacing Polarity Bipolar    Lead Channel Setting Pacing Anode Location Right Ventricle    Lead Channel Setting Pacing Anode Terminal Ring    Lead Channel Setting Sensing Cathode Location Right Ventricle    Lead Channel Setting Sensing Cathode Terminal Tip    Lead Channel Setting Pacing Pulse Width 1    Lead Channel Setting Pacing Amplitude 5    Lead Channel Setting Pacing Capture Mode Adaptive    Zone Setting Type Category VF    Zone Setting Detection Interval 320    Zone Setting Detection Beats Numerator 30    Zone Setting Detection Beats Denominator 40    Zone Setting Type Category VT    Zone Setting Detection Interval 280    Zone Setting Type Category VT    Zone Setting Detection Interval 350    Zone Setting Type Category VT    Zone  Setting Detection Interval 400    Zone Setting Type Category ATRIAL_FIBRILLATION    Zone Setting Type Category AT/AF    Zone Setting Detection Interval 350    Lead Channel Impedance Value 285    Lead Channel Sensing Intrinsic Amplitude 0.625    Lead Channel Pacing Threshold Amplitude 0.75    Lead Channel Pacing Threshold Pulse Width 0.4    Lead Channel Impedance Value 665    Lead Channel Impedance Value 551    Lead Channel Sensing Intrinsic Amplitude 0.75    Lead Channel Pacing Threshold Amplitude 2.75    Lead Channel Pacing Threshold Pulse Width 1    Battery Date Time of Measurements 20220712093725    Battery Status OK    Battery RRT Trigger 2.727    Battery Remaining Longevity 63    Battery Voltage 2.94    Capacitor Charge Type Reformation    Capacitor Last Charge Date Time 20220528111827    Capacitor Charge Time 3.973    Capacitor Charge Energy 18    Marcos Statistic Date Time Start 64597427818192    Marcos Statistic Date Time End 24370525520556    Marcos Statistic RA Percent Paced 0    Marcos Statistic RV Percent Paced 0    Marcos Statistic AP  Percent 0    Marcos Statistic AS  Percent 0    Marcos Statistic AP VS Percent 0    Marcso Statistic AS VS Percent 100    Atrial Tachy Statistic Date Time Start 26366215617746    Atrial Tachy Statistic Date Time End 02565450420294    Atrial Tachy Statistic AT/AF [place] Percent 0    Therapy Statistic Recent Shocks Delivered 0    Therapy Statistic Recent Shocks Aborted 0    Therapy Statistic Recent ATP Delivered 0    Therapy Statistic Recent Date Time Start 08244290650906    Therapy Statistic Recent Date Time End 92351551806142    Therapy Statistic Total Shocks Delivered 1    Therapy Statistic Total Shocks Aborted 0    Therapy Statistic Total ATP Delivered 0    Therapy Statistic Total  Date Time Start 40428969361124    Therapy Statistic Total  Date Time End 16415578104212    Episode Statistic Recent Count 0    Episode Statistic Type Category AT/AF    Episode Statistic Recent  Count 0    Episode Statistic Type Category SVT    Episode Statistic Recent Count 0    Episode Statistic Type Category VT    Episode Statistic Recent Count 0    Episode Statistic Type Category VF    Episode Statistic Recent Count 0    Episode Statistic Type Category VT    Episode Statistic Recent Count 0    Episode Statistic Type Category VT    Episode Statistic Recent Count 0    Episode Statistic Type Category VT    Episode Statistic Recent Date Time Start 00654483675024    Episode Statistic Recent Date Time End 63555813112216    Episode Statistic Recent Date Time Start 88362948115677    Episode Statistic Recent Date Time End 91531235352446    Episode Statistic Recent Date Time Start 81000959049008    Episode Statistic Recent Date Time End 35357292511878    Episode Statistic Recent Date Time Start 22068791828909    Episode Statistic Recent Date Time End 63572259083168    Episode Statistic Recent Date Time Start 24031868090977    Episode Statistic Recent Date Time End 87529851698974    Episode Statistic Recent Date Time Start 83008979311410    Episode Statistic Recent Date Time End 79039606620389    Episode Statistic Recent Date Time Start 08313702757518    Episode Statistic Recent Date Time End 88451663328569    Episode Statistic Total Count 33    Episode Statistic Type Category AT/AF    Episode Statistic Total Count 0    Episode Statistic Type Category SVT    Episode Statistic Total Count 9    Episode Statistic Type Category VT    Episode Statistic Total Count 1    Episode Statistic Type Category VF    Episode Statistic Total Count 0    Episode Statistic Type Category VT    Episode Statistic Total Count 0    Episode Statistic Type Category VT    Episode Statistic Total Count 1    Episode Statistic Type Category VT    Episode Statistic Total Date Time Start 10728639314255    Episode Statistic Total Date Time End 28359681126123    Episode Statistic Total Date Time Start [id number]    Episode Statistic Total Date  Time End [id number]    Episode Statistic Total Date Time Start [id number]    Episode Statistic Total Date Time End [id number]    Episode Statistic Total Date Time Start [id number]    Episode Statistic Total Date Time End [id number]    Episode Statistic Total Date Time Start [id number]    Episode Statistic Total Date Time End [id number]    Episode Statistic Total Date Time Start [id number]    Episode Statistic Total Date Time End [id number]    Episode Statistic Total Date Time Start 20180412151624    Episode Statistic Total Date Time End [id number]    Narrative    Patient seen on [facility] 4E for evaluation and iterative programming of their ICD per MD orders, post-op chest washout and closure.     Device: Plovgh [id number] Evera XT DR  Normal device function. R Waves have drastically changed, today measuring 0.8 mV. RV threshold has also risen to 2.75 V @ 1 ms. RV impedance shows an increase in unipolar and unipolar.  Mode: AAI-DDD  bpm  AP: 0%  : 0%  Intrinsic Rhythm:  bpm  Short V-V intervals: 0  Lead Trends Appear Stable.   Estimated battery longevity to RRT = 5.2 years. Battery voltage = 2.95 V.   No Arrhythmias Recorded as Detection was ooff for surgery.  Setting Changes: ICD Tachy Detection and Therapies Turned ON.    Plan: Follow pt PRN while hospitalized  JESU Cueva RN    Dual lead ICD    I have reviewed and interpreted the device interrogation, settings, programming and nurse's summary. The device is functioning within normal device parameters. I agree with the current findings, assessment and plan.         6. LINES/DRAINS/AIRWAY    Peripheral IV 07/05/22 Left;Anterior Upper forearm (Active)   Site Assessment WDL 07/16/22 0800   Line Status Saline locked 07/16/22 0800   Dressing Intervention New dressing  07/12/22 0000   Phlebitis Scale 0-->no symptoms 07/16/22 0800   Infiltration Scale 0 07/16/22 0800   If infiltrated, was a vesicant infusing? No 07/11/22  1800   Number of days: 11       PICC Triple Lumen 06/17/22 Right Brachial vein medial (Active)   Site Assessment WDL 07/16/22 0800   Dressing Intervention Chlorhexidine patch;Transparent 07/16/22 0800   Dressing Change Due 07/17/22 07/16/22 0400   PICC Comment from OSH 06/17/22 1700   Osborne - Status infusing 07/16/22 0800   Osborne - Cap Change Due 07/19/22 07/16/22 0400   Red - Status infusing 07/16/22 0800   Red - Cap Change Due 07/19/22 07/16/22 0400   White - Status infusing 07/16/22 0800   White - Cap Change Due 07/19/22 07/16/22 0400   Extravasation? No 07/14/22 2000   Line Necessity Yes, meets criteria 07/16/22 0800   Number of days: 29       Introducer 07/08/22 Femoral Left (Active)   Specific Qualities Capped 07/16/22 0800   (Retired) Dressing Status Clean, dry, intact 07/16/22 0800   Dressing Intervention Dressing changed 07/10/22 0000   Dressing Change Due 07/17/22 07/16/22 0400   Number of days: 8       Arterial Line 07/14/22 Radial (Active)   Site Assessment WDL 07/16/22 0800   Line Status Pulsatile blood flow 07/16/22 0800   Arterine Line Cap Change Due 07/19/22 07/15/22 0400   Art Line Waveform Appropriate;Square wave test performed 07/16/22 0800   Art Line Interventions Leveled;Connections checked and tightened 07/16/22 0800   Color/Movement/Sensation Capillary refill less than 3 sec 07/16/22 0800   Line Necessity Yes, meets criteria 07/16/22 0800   Dressing Type Transparent 07/16/22 0800   Dressing Status Clean, dry, intact 07/16/22 0800   Dressing Change Due 07/17/22 07/16/22 0400   Number of days: 2       Venous Sheath 07/08/22 Other (comment) Right (Active)   Specific Qualities Sutured 07/16/22 0800   Site Assessment UTV 07/16/22 0800   Dressing Type Transparent 07/16/22 0800   Dressing Intervention Dressing reinforced 07/13/22 0400   Venous Sheath Comment Protec Duo 07/16/22 0800   Number of days: 8       CVC Double Lumen Left Femoral (Active)   Site Assessment WDL 07/16/22 0800   Dressing Type  Chlorhexidine disk;Transparent 07/16/22 0800   Dressing Status clean;dry;intact 07/16/22 0800   Dressing Intervention dressing changed 07/10/22 0000   Dressing Change Due 07/17/22 07/16/22 0400   Line Necessity yes, meets criteria 07/16/22 0800   Brown - Status saline locked 07/16/22 0800   Brown - Cap Change Due 07/19/22 07/16/22 0400   White - Status saline locked 07/16/22 0800   White - Cap Change Due 07/19/22 07/16/22 0400   Phlebitis Scale 0-->no symptoms 07/16/22 0800   Infiltration? no 07/16/22 0800   Infiltration Scale 0 07/14/22 1600   CVC Comment MAC 07/16/22 0800   Number of days: 8       ETT Cuffed Single 8 mm (Active)   Secured at (cm) 25 cm 07/16/22 1224   Measured from Lips 07/16/22 1224   Position Center 07/16/22 1224   Secured by Commercial tube partida;Other (Comment) 07/16/22 1224   Bite Block None Present 07/15/22 0016   Site Appearance Clean;Dry 07/16/22 0400   Tube Care Site care done 07/15/22 1600   Cuff Assessment Minimal occluding volume 07/16/22 1224   Safety Measures Manual resuscitator/mask/valve in room 07/16/22 1224   Number of days: 8       Chest Tube 1 Anterior Mediastinal 9 Danish (Active)   Site Assessment UTV 07/16/22 1200   Suction -20 cm H2O 07/16/22 1200   Chest Tube Airleak No 07/16/22 1200   Drainage Description Serosanguinous 07/16/22 1200   Dressing Status Normal: Clean, Dry & Intact 07/16/22 1200   Dressing Change Due 07/17/22 07/16/22 0400   Dressing Intervention Gauze 07/16/22 0400   Patency Intervention Tip/Tilt 07/16/22 1200   Chest Tube Clamps at Bedside present 07/16/22 1200   Container Amount 680 07/16/22 1200   Output (ml) 10 ml 07/16/22 1200   Number of days: 8       Chest Tube 2 Anterior Mediastinal 9 Danish (Active)   Site Assessment UTV 07/16/22 1200   Suction -20 cm H2O 07/16/22 1200   Chest Tube Airleak No 07/16/22 1200   Drainage Description Serosanguinous 07/16/22 1200   Dressing Status Normal: Clean, Dry & Intact 07/16/22 1200   Dressing Change Due 07/17/22  07/16/22 0400   Dressing Intervention Transparent 07/16/22 1200   Patency Intervention Tip/Tilt 07/16/22 1200   Chest Tube Clamps at Bedside present 07/16/22 1200   Number of days: 8       Chest Tube 3 Pleural 32 Salvadorean (Active)   Site Assessment UTV 07/16/22 1200   Suction -20 cm H2O 07/16/22 1200   Chest Tube Airleak No 07/16/22 1200   Drainage Description Serosanguinous 07/16/22 1200   Dressing Status Normal: Clean, Dry & Intact 07/16/22 1200   Dressing Change Due 07/17/22 07/16/22 0400   Dressing Intervention Gauze 07/16/22 0400   Patency Intervention Tip/Tilt 07/16/22 1200   Chest Tube Clamps at Bedside present 07/16/22 1200   Container Amount 1950 07/16/22 1200   Output (ml) 40 ml 07/16/22 1200   Number of days: 8       Chest Tube 4 Posterior Pericardial 24 Salvadorean (Active)   Site Assessment UTV 07/16/22 1200   Suction -20 cm H2O 07/16/22 1200   Chest Tube Airleak No 07/16/22 1200   Drainage Description Serosanguinous 07/16/22 1200   Dressing Status Normal: Clean, Dry & Intact 07/16/22 1200   Dressing Change Due 07/17/22 07/16/22 0400   Dressing Intervention Gauze 07/15/22 0000   Patency Intervention Tip/Tilt 07/16/22 1200   Chest Tube Clamps at Bedside present 07/16/22 1200   Number of days: 8       Negative Pressure Wound Therapy Sternum Upper (Active)   Wound Type Surgical 07/16/22 1200   Unit Type Vac Ulta 07/16/22 1200   Target Pressure (mmHg) 125 07/16/22 1200   [place]annister changed? No 07/16/22 1200   Output (ml) 0 ml 07/16/22 1200   Number of days: 3       NG/OG/NJ Tube Nasojejunal Right nostril (Active)   Site Description WDL 07/16/22 1200   Status Enteral Feedings 07/16/22 1200   Placement Confirmation [place] unchanged 07/16/22 1200   [place] (cm marking) at nare/mouth 113 cm 07/16/22 1200   Intake (ml) 40 ml 07/16/22 0800   Flush/Free Water (mL) 30 mL 07/16/22 1200   Number of days: 5       NG/OG/NJ Tube Orogastric Right mouth (Active)   Site Description WDL 07/16/22 1200   Status Suction-low  intermittent 07/16/22 1200   Drainage Appearance Bile;Brown 07/16/22 1200   Placement Confirmation [place] unchanged 07/16/22 1200   [place] (cm marking) at nare/mouth 64 cm 07/16/22 1200   Flush/Free Water (mL) 30 mL 07/16/22 0800   Container Amount 600 mL 07/16/22 1200   Output (ml) 0 ml 07/16/22 1200   Number of days: 4       Rectal Tube (Active)   Balloon fill volume  40 cc 07/16/22 1200   Stool Leakage Small 07/16/22 1200   Rectal Tube Container Volume 200 ml 07/16/22 1200   Rectal Tube Output 150 ml 07/16/22 1200   Number of days: 2       Urethral Catheter 07/08/22 Coude;Latex;Temperature probe;Triple-lumen 16 fr (Active)   Tube Description Positional 07/16/22 1200   Catheter Care Done;Catheter wipes 07/16/22 1200   Collection Container Standard;Patent 07/16/22 1200   Securement Method Securing device (Describe) 07/16/22 1200   Rationale for Continued Use Strict 1-2 Hour I&O 07/16/22 1200   Urine Output 55 mL 07/16/22 1300   Number of days: 8       Wound Face Pressure injury hospital acquired Stage 1 (Active)   Wound Bed Erythema, non-blanchable, skin intact 07/15/22 2000   Estimated Circumference ( if not measured quarter sized 07/14/22 1200   Drainage Amount None 07/15/22 2000   Dressing Foam 07/15/22 2000   Dressing Status Clean, dry, intact 07/15/22 2000   Number of days: 3       Incision/Surgical Site 06/23/22 Right Chest (Active)   Incision Assessment WDL 07/16/22 0400   Deirdre-Incision Assessment UTV 07/15/22 0800   Closure Liquid bandage;Approximated 07/13/22 0400   Incision Drainage Amount UTV 07/16/22 0400   Drainage Description UTV 07/16/22 0400   Incision Care Wound cleanser 07/13/22 0400   Dressing Intervention Clean, dry, intact 07/16/22 0400   Number of days: 23       Incision/Surgical Site 07/08/22 Midline Chest (Active)   Incision Assessment UTV 07/16/22 0400   Deirdre-Incision Assessment UTV 07/15/22 0800   Closure Other (Comment) 07/13/22 1600   Incision Drainage Amount Scant 07/11/22 1200    Drainage Description Sanguinous 07/11/22 0400   Incision Care Therapy - negative pressure 07/16/22 0400   Dressing Intervention Clean, dry, intact 07/16/22 0400   Number of days: 8       Incision/Surgical Site 07/13/22 Chest (Active)   Number of days: 3       Incision/Surgical Site 07/13/22 Bilateral Chest (Active)   Closure Sutures 07/13/22 1805   Number of days: 3       Incision/Surgical Site 07/13/22 Medial Sternum (Active)   Incision Assessment UTV 07/16/22 0400   Deirdre-Incision Assessment Pale 07/14/22 0400   Closure Other (Comment) 07/14/22 0400   Incision Drainage Amount None 07/14/22 1600   Incision Care Therapy - negative pressure 07/16/22 0400   Dressing Intervention Clean, dry, intact 07/16/22 0400   Number of days: 3          ====================================================

## 2022-07-16 NOTE — PROGRESS NOTES
Pt pressure supported X 3 hs this shift and tolerated it well. Sedation vacation done and pt squeezed hands and wiggled toes.  Nodded yes when asked if he was in pain (gave bolus from pump). Daughter at the bedside at that time.  Pt up in the recliner towards the end of this shift.  Lowered RVAD to 3 LPM per team.  For vital signs and complete assessment see documentation flow sheet.  Will continue to monitor.

## 2022-07-16 NOTE — PROGRESS NOTES
VAD Shift Summary: [id number]    VAD Equipment:  Primary console serial number: [id number]  Backup console serial number: [id number]  Circuit lot number: [id number]  Pump head lot number: [id number]     Patient remains on RVAD via Centrimag, all equipment is functioning and alarms are appropriately set. RPM's 4050 with flow range 3.42-3.43 L/min (HeartMate LVAD flow has been ~4 LPM). Circuit remains free of air. Scattered fibrin buildup noted at circuit connectors and a few areas of heavier fibrin/clot buildup in tubing and at return cannula connector. RIJ Protek Duo cannnula is secure with no bleeding from site. Extremities are warm/perfused.   Backup console on, emergency change out supplies at bedside (including spare pump head, sterile scissors, 2 connectors).     Significant Shift Events: 1 unit of RBCs given for hgb of 6.7.    Vent settings:  Vent Mode: CMV/AC  (Continuous Mandatory Ventilation/ Assist Control)  FiO2 (%): 35 %  Resp Rate (Set): 12 breaths/min  Tidal Volume (Set, mL): 450 mL  PEEP (cm H2O): 5 cmH2O  Resp: 22    Anticoagulation and Volume Status:  Anticoagulation is heparin and currently is running at 1250 unit(s)/hr with Anti-Xa goal of 0.15-0.35. Last check was 0.38. Titration by bedside RN.     Urine output is low, blood loss was low. Product given included 1 unit RBCs.    Intake/Output Summary (Last 24 hours) at 7/16/2022 0558  Last data filed at 7/16/2022 0500  Gross per 24 hour   Intake 3291.29 ml   Output 1910 ml   Net 1381.29 ml       Recent Imaging:  Recent Results (from the past 24 hour(s))   XR Chest Port 1 View    Narrative    EXAM: XR CHEST PORT 1 VIEW  7/15/2022 6:29 AM     HISTORY:  Desaturation       COMPARISON:  Earlier same day chest radiograph    FINDINGS: Single view of the chest. Surgical changes of the chest with  intact median sternotomy wires. Endotracheal tube tip projects over  the low thoracic trachea. Enteric tubes course below the diaphragm and  beyond the  field-of-view. The esophageal temperature probe projects at  the proximal esophagus. Right IJ RVAD tip projects over the main  pulmonary trunk. Right upper extremity PICC tip projects over the mid  SVC. Stable left chest wall cardiac device and LVAD. Stable  mediastinal drains and left chest tube.    Stable enlarged cardiac silhouette. Blunting of the costophrenic  angles. No appreciable pneumothorax. Low lung volumes with similar  bilateral diffuse mixed interstitial and airspace opacities.      Impression    IMPRESSION:   1. No significant change in small pleural effusions with bilateral  diffuse pulmonary opacities.  2. Stable support devices.    I have personally reviewed the examination and initial interpretation  and I agree with the findings.    LAI HERNADEZ MD         SYSTEM ID:  [id number]   Cardiac Device Check - Inpatient   Result Value    Date Time Interrogation Session [id number]    Implantable Pulse Generator  Medtronic    Implantable Pulse Generator Model [id number] Evera XT DR    Implantable Pulse Generator Serial Number [id number]    Type Interrogation Session In Clinic    Clinic Name [facility]    Implantable Pulse Generator Type Defibrillator    Implantable Pulse Generator Implant Date 20180412    Implantable Lead  Medtronic    Implantable Lead Model 5076 CapSureFix Novus    Implantable Lead Serial Number [id number]    Implantable Lead Implant Date 20060306    Implantable Lead Polarity Type Bipolar Lead    Implantable Lead Location Detail 1 UNKNOWN    Implantable Lead Location Right Atrium    Implantable Lead  Medtronic    Implantable Lead Model 6949 Sprint Doron    Implantable Lead Serial Number [id number]    Implantable Lead Implant Date 20060306    Implantable Lead Polarity Type Bipolar Lead    Implantable Lead Location Detail 1 UNKNOWN    Implantable Lead Location Right Ventricle    Marcos Setting Mode (NBG Code) MVP_AAI_DDD     Marcos Setting Lower Rate Limit 50    Marcos Setting Maximum Tracking Rate 130    Marcos Setting Maximum Sensor Rate 115    Marcos Setting Hysterisis Rate DISABLED    Marcos Setting TRISTEN Delay Low 350    Marcos Setting PAV Delay Low 350    Marcos Setting AT Mode Switch Rate 171    Lead Channel Setting Sensing Polarity Bipolar    Lead Channel Setting Sensing Anode Location Right Atrium    Lead Channel Setting Sensing Anode Terminal Ring    Lead Channel Setting Sensing Cathode Location Right Atrium    Lead Channel Setting Sensing Cathode Terminal Tip    Lead Channel Setting Sensing Sensitivity 0.3    Lead Channel Setting Sensing Polarity Bipolar    Lead Channel Setting Sensing Anode Location Right Ventricle    Lead Channel Setting Sensing Anode Terminal Ring    Lead Channel Setting Sensing Cathode Location Right Ventricle    Lead Channel Setting Sensing Cathode Terminal Tip    Lead Channel Setting Sensing Sensitivity 0.3    Lead Channel Setting Pacing Polarity Bipolar    Lead Channel Setting Pacing Anode Location Right Atrium    Lead Channel Setting Pacing Anode Terminal Ring    Lead Channel Setting Sensing Cathode Location Right Atrium    Lead Channel Setting Sensing Cathode Terminal Tip    Lead Channel Setting Pacing Pulse Width 0.4    Lead Channel Setting Pacing Amplitude 2    Lead Channel Setting Pacing Capture Mode Adaptive    Lead Channel Setting Pacing Polarity Bipolar    Lead Channel Setting Pacing Anode Location Right Ventricle    Lead Channel Setting Pacing Anode Terminal Ring    Lead Channel Setting Sensing Cathode Location Right Ventricle    Lead Channel Setting Sensing Cathode Terminal Tip    Lead Channel Setting Pacing Pulse Width 1    Lead Channel Setting Pacing Amplitude 5    Lead Channel Setting Pacing Capture Mode Adaptive    Zone Setting Type Category VF    Zone Setting Detection Interval 320    Zone Setting Detection Beats Numerator 30    Zone Setting Detection Beats Denominator 40    Zone Setting Type  Category VT    Zone Setting Detection Interval 280    Zone Setting Type Category VT    Zone Setting Detection Interval 350    Zone Setting Type Category VT    Zone Setting Detection Interval 400    Zone Setting Type Category ATRIAL_FIBRILLATION    Zone Setting Type Category AT/AF    Zone Setting Detection Interval 350    Lead Channel Impedance Value 304    Lead Channel Sensing Intrinsic Amplitude 0.6    Lead Channel Pacing Threshold Amplitude 0.5    Lead Channel Pacing Threshold Pulse Width 0.4    Lead Channel Impedance Value 475    Lead Channel Impedance Value 418    Lead Channel Sensing Intrinsic Amplitude 0.6    Lead Channel Pacing Threshold Amplitude 4    Lead Channel Pacing Threshold Pulse Width 1    Battery Date Time of Measurements 20220715101154    Battery Status OK    Battery RRT Trigger 2.727    Battery Remaining Longevity 59    Battery Voltage 2.93    Capacitor Charge Type Reformation    Capacitor Last Charge Date Time [id number]    Capacitor Charge Time 3.973    Capacitor Charge Energy 18    Marcos Statistic Date Time Start 72405494280901    Marcos Statistic Date Time End 20220715100938    Marcos Statistic RA Percent Paced 0    Marcos Statistic RV Percent Paced 0    Marcos Statistic AP  Percent 0    Marcos Statistic AS  Percent 0    Marcos Statistic AP VS Percent 0    Marcos Statistic AS VS Percent 100    Atrial Tachy Statistic Date Time Start 46401677725626    Atrial Tachy Statistic Date Time End 20220715100938    Atrial Tachy Statistic AT/AF [address] Percent 0    Therapy Statistic Recent Shocks Delivered 0    Therapy Statistic Recent Shocks Aborted 0    Therapy Statistic Recent ATP Delivered 0    Therapy Statistic Recent Date Time Start 85321213673952    Therapy Statistic Recent Date Time End 20220715100938    Therapy Statistic Total Shocks Delivered 1    Therapy Statistic Total Shocks Aborted 0    Therapy Statistic Total ATP Delivered 0    Therapy Statistic Total  Date Time Start 47146909090557     Therapy Statistic Total  Date Time End 85729972522254    Episode Statistic Recent Count 0    Episode Statistic Type Category AT/AF    Episode Statistic Recent Count 0    Episode Statistic Type Category SVT    Episode Statistic Recent Count 0    Episode Statistic Type Category VT    Episode Statistic Recent Count 0    Episode Statistic Type Category VF    Episode Statistic Recent Count 0    Episode Statistic Type Category VT    Episode Statistic Recent Count 0    Episode Statistic Type Category VT    Episode Statistic Recent Count 0    Episode Statistic Type Category VT    Episode Statistic Recent Date Time Start 17923420645797    Episode Statistic Recent Date Time End 95810818124172    Episode Statistic Recent Date Time Start 23464980989297    Episode Statistic Recent Date Time End 79637318917989    Episode Statistic Recent Date Time Start 31233173530949    Episode Statistic Recent Date Time End 86071170323109    Episode Statistic Recent Date Time Start 67285759823378    Episode Statistic Recent Date Time End 47397577531404    Episode Statistic Recent Date Time Start 90927143753189    Episode Statistic Recent Date Time End 47008509656357    Episode Statistic Recent Date Time Start 09901263651226    Episode Statistic Recent Date Time End 71383694517166    Episode Statistic Recent Date Time Start 00740248698259    Episode Statistic Recent Date Time End 54927502890694    Episode Statistic Total Count 33    Episode Statistic Type Category AT/AF    Episode Statistic Total Count 0    Episode Statistic Type Category SVT    Episode Statistic Total Count 9    Episode Statistic Type Category VT    Episode Statistic Total Count 1    Episode Statistic Type Category VF    Episode Statistic Total Count 0    Episode Statistic Type Category VT    Episode Statistic Total Count 0    Episode Statistic Type Category VT    Episode Statistic Total Count 1    Episode Statistic Type Category VT    Episode Statistic Total Date Time Start  00381728577794    Episode Statistic Total Date Time End 20220715100938    Episode Statistic Total Date Time Start 20180412151624    Episode Statistic Total Date Time End 20220715100938    Episode Statistic Total Date Time Start 20180412151624    Episode Statistic Total Date Time End 20220715100938    Episode Statistic Total Date Time Start 20180412151624    Episode Statistic Total Date Time End 20220715100938    Episode Statistic Total Date Time Start 20180412151624    Episode Statistic Total Date Time End 20220715100938    Episode Statistic Total Date Time Start 20180412151624    Episode Statistic Total Date Time End 20220715100938    Episode Statistic Total Date Time Start 20180412151624    Episode Statistic Total Date Time End 20220715100938    Narrative    Patient seen in [address] [place] [address] for evaluation and iterative programming of their ICD per MD orders for arrhythmia assessment.    Device: [employer] [id number] Evera XT   Normal Device Function.  Mode: AAI<=>DDD /130 bpm  AP: <0.1%  : 0%  Intrinsic rhythm: ST @ 109 bpm  Short V-V intervals: 0  Thoracic Impedance: Below reference line, suggesting possible fluid accumulation.  Lead Trends Appear Stable: RV lead impedance stable. R-wave amplitude remains small, measuring at 0.6 mV today. RV capture threshold continues to climb, measuring at 4.00 V @ 1.0 ms today. Cardiology team at bedside during interrogation and notified of these findings.  Estimated battery longevity to RRT = 4.9 years. Battery voltage = 2.94 V.  Atrial arrhythmia: None  AF burden: 0%  Anticoagulant: Heparin gtt  Ventricular Arrhythmia: None  Setting changes: None    Plan: Continue inpatient evaluation and treatment.  RICKIE Mcpherson RN    Dual lead ICD    I have reviewed and interpreted the device interrogation, settings, programming and nurse's summary. The device is functioning within normal device parameters. I agree with the current findings, assessment and plan.       Labs:  Recent Labs    Lab 07/16/22  0453 07/16/22  0141 07/15/22  2202 07/15/22  1808   PH 7.46* 7.42 7.43 7.44   PCO2 32* 35 35 33*   PO2 122* 135* 92 99   HCO3 23 23 23 22   O2PER 35 35 35 35       Lab Results   Component Value Date    HGB 8.4 (L) 07/16/2022    PHGB 70 (H) 07/15/2022     (L) 07/16/2022    FIBR 561 (H) 07/16/2022    INR 1.12 07/16/2022     (HH) 07/16/2022    DD 2.51 (H) 07/16/2022    ANTCH 91 07/15/2022       Plan:   Plan is to continue RVAD support per MD-directed goals.      Cherelle Agustin, RT  ECMO Specialist  7/16/2022   5:58 AM

## 2022-07-17 ENCOUNTER — APPOINTMENT (OUTPATIENT)
Dept: GENERAL RADIOLOGY | Facility: CLINIC | Age: 62
DRG: 001 | End: 2022-07-17
Attending: INTERNAL MEDICINE
Payer: COMMERCIAL

## 2022-07-17 ENCOUNTER — APPOINTMENT (OUTPATIENT)
Dept: CARDIOLOGY | Facility: CLINIC | Age: 62
DRG: 001 | End: 2022-07-17
Attending: STUDENT IN AN ORGANIZED HEALTH CARE EDUCATION/TRAINING PROGRAM
Payer: COMMERCIAL

## 2022-07-17 LAB
ALBUMIN SERPL BCG-MCNC: 2.6 G/DL (ref 3.5–5.2)
ALT SERPL W P-5'-P-CCNC: 13 U/L (ref 10–50)
ANION GAP SERPL CALCULATED.3IONS-SCNC: 10 MMOL/L (ref 7–15)
ANION GAP SERPL CALCULATED.3IONS-SCNC: 11 MMOL/L (ref 7–15)
ANION GAP SERPL CALCULATED.3IONS-SCNC: 8 MMOL/L (ref 7–15)
ANION GAP SERPL CALCULATED.3IONS-SCNC: 8 MMOL/L (ref 7–15)
APTT PPP: 104 SECONDS (ref 22–38)
APTT PPP: 111 SECONDS (ref 22–38)
APTT PPP: 111 SECONDS (ref 22–38)
APTT PPP: 118 SECONDS (ref 22–38)
AT III ACT/NOR PPP CHRO: 93 % (ref 85–135)
BACTERIA BLD CULT: NO GROWTH
BACTERIA BLD CULT: NO GROWTH
BASE EXCESS BLDA CALC-SCNC: -0.6 MMOL/L (ref -9–1.8)
BASE EXCESS BLDA CALC-SCNC: -1.1 MMOL/L (ref -9–1.8)
BASE EXCESS BLDA CALC-SCNC: -1.7 MMOL/L (ref -9–1.8)
BASE EXCESS BLDA CALC-SCNC: 0.3 MMOL/L (ref -9–1.8)
BASE EXCESS BLDA CALC-SCNC: 1.3 MMOL/L (ref -9–1.8)
BASOPHILS # BLD MANUAL: 0 10E3/UL (ref 0–0.2)
BASOPHILS # BLD MANUAL: 0.1 10E3/UL (ref 0–0.2)
BASOPHILS NFR BLD MANUAL: 0 %
BASOPHILS NFR BLD MANUAL: 1 %
BUN SERPL-MCNC: 27.9 MG/DL (ref 8–23)
BUN SERPL-MCNC: 29 MG/DL (ref 8–23)
BUN SERPL-MCNC: 29.4 MG/DL (ref 8–23)
BUN SERPL-MCNC: 30.8 MG/DL (ref 8–23)
CA-I BLD-MCNC: 4.5 MG/DL (ref 4.4–5.2)
CA-I BLD-MCNC: 4.5 MG/DL (ref 4.4–5.2)
CA-I BLD-MCNC: 4.6 MG/DL (ref 4.4–5.2)
CA-I BLD-MCNC: 4.7 MG/DL (ref 4.4–5.2)
CALCIUM SERPL-MCNC: 7.8 MG/DL (ref 8.8–10.2)
CALCIUM SERPL-MCNC: 7.8 MG/DL (ref 8.8–10.2)
CALCIUM SERPL-MCNC: 8.1 MG/DL (ref 8.8–10.2)
CALCIUM SERPL-MCNC: 8.1 MG/DL (ref 8.8–10.2)
CHLORIDE SERPL-SCNC: 103 MMOL/L (ref 98–107)
CHLORIDE SERPL-SCNC: 105 MMOL/L (ref 98–107)
CREAT SERPL-MCNC: 0.58 MG/DL (ref 0.67–1.17)
CREAT SERPL-MCNC: 0.67 MG/DL (ref 0.67–1.17)
CREAT SERPL-MCNC: 0.69 MG/DL (ref 0.67–1.17)
CREAT SERPL-MCNC: 0.69 MG/DL (ref 0.67–1.17)
D DIMER PPP FEU-MCNC: 3.63 UG/ML FEU (ref 0–0.5)
D DIMER PPP FEU-MCNC: 3.76 UG/ML FEU (ref 0–0.5)
DEPRECATED HCO3 PLAS-SCNC: 22 MMOL/L (ref 22–29)
DEPRECATED HCO3 PLAS-SCNC: 24 MMOL/L (ref 22–29)
ELLIPTOCYTES BLD QL SMEAR: SLIGHT
EOSINOPHIL # BLD MANUAL: 0 10E3/UL (ref 0–0.7)
EOSINOPHIL # BLD MANUAL: 0.2 10E3/UL (ref 0–0.7)
EOSINOPHIL # BLD MANUAL: 0.4 10E3/UL (ref 0–0.7)
EOSINOPHIL # BLD MANUAL: 0.5 10E3/UL (ref 0–0.7)
EOSINOPHIL NFR BLD MANUAL: 0 %
EOSINOPHIL NFR BLD MANUAL: 1 %
EOSINOPHIL NFR BLD MANUAL: 3 %
EOSINOPHIL NFR BLD MANUAL: 3 %
ERYTHROCYTE [DISTWIDTH] IN BLOOD BY AUTOMATED COUNT: 18.1 % (ref 10–15)
ERYTHROCYTE [DISTWIDTH] IN BLOOD BY AUTOMATED COUNT: 18.2 % (ref 10–15)
ERYTHROCYTE [DISTWIDTH] IN BLOOD BY AUTOMATED COUNT: 18.3 % (ref 10–15)
ERYTHROCYTE [DISTWIDTH] IN BLOOD BY AUTOMATED COUNT: 18.3 % (ref 10–15)
FIBRINOGEN PPP-MCNC: 501 MG/DL (ref 170–490)
FIBRINOGEN PPP-MCNC: 523 MG/DL (ref 170–490)
GFR SERPL CREATININE-BSD FRML MDRD: >90 ML/MIN/1.73M2
GLUCOSE BLDC GLUCOMTR-MCNC: 132 MG/DL (ref 70–99)
GLUCOSE BLDC GLUCOMTR-MCNC: 138 MG/DL (ref 70–99)
GLUCOSE BLDC GLUCOMTR-MCNC: 141 MG/DL (ref 70–99)
GLUCOSE SERPL-MCNC: 120 MG/DL (ref 70–99)
GLUCOSE SERPL-MCNC: 128 MG/DL (ref 70–99)
GLUCOSE SERPL-MCNC: 129 MG/DL (ref 70–99)
GLUCOSE SERPL-MCNC: 139 MG/DL (ref 70–99)
HCO3 BLD-SCNC: 23 MMOL/L (ref 21–28)
HCO3 BLD-SCNC: 24 MMOL/L (ref 21–28)
HCO3 BLD-SCNC: 24 MMOL/L (ref 21–28)
HCO3 BLD-SCNC: 25 MMOL/L (ref 21–28)
HCO3 BLD-SCNC: 26 MMOL/L (ref 21–28)
HCT VFR BLD AUTO: 22.6 % (ref 40–53)
HCT VFR BLD AUTO: 22.9 % (ref 40–53)
HCT VFR BLD AUTO: 23 % (ref 40–53)
HCT VFR BLD AUTO: 24.8 % (ref 40–53)
HGB BLD-MCNC: 7.1 G/DL (ref 13.3–17.7)
HGB BLD-MCNC: 7.2 G/DL (ref 13.3–17.7)
HGB BLD-MCNC: 7.3 G/DL (ref 13.3–17.7)
HGB BLD-MCNC: 8 G/DL (ref 13.3–17.7)
HGB FREE PLAS-MCNC: <30 MG/DL
INR PPP: 1.11 (ref 0.85–1.15)
INR PPP: 1.15 (ref 0.85–1.15)
INR PPP: 1.24 (ref 0.85–1.15)
INR PPP: 1.25 (ref 0.85–1.15)
LACTATE SERPL-SCNC: 1.1 MMOL/L (ref 0.7–2)
LACTATE SERPL-SCNC: 1.2 MMOL/L (ref 0.7–2)
LACTATE SERPL-SCNC: 1.2 MMOL/L (ref 0.7–2)
LACTATE SERPL-SCNC: 1.3 MMOL/L (ref 0.7–2)
LVEF ECHO: NORMAL
LYMPHOCYTES # BLD MANUAL: 0.5 10E3/UL (ref 0.8–5.3)
LYMPHOCYTES # BLD MANUAL: 1 10E3/UL (ref 0.8–5.3)
LYMPHOCYTES # BLD MANUAL: 1.4 10E3/UL (ref 0.8–5.3)
LYMPHOCYTES # BLD MANUAL: 1.5 10E3/UL (ref 0.8–5.3)
LYMPHOCYTES NFR BLD MANUAL: 10 %
LYMPHOCYTES NFR BLD MANUAL: 4 %
LYMPHOCYTES NFR BLD MANUAL: 7 %
LYMPHOCYTES NFR BLD MANUAL: 9 %
MAGNESIUM SERPL-MCNC: 1.8 MG/DL (ref 1.7–2.3)
MAGNESIUM SERPL-MCNC: 1.9 MG/DL (ref 1.7–2.3)
MCH RBC QN AUTO: 28.9 PG (ref 26.5–33)
MCH RBC QN AUTO: 29.6 PG (ref 26.5–33)
MCH RBC QN AUTO: 29.6 PG (ref 26.5–33)
MCH RBC QN AUTO: 29.7 PG (ref 26.5–33)
MCHC RBC AUTO-ENTMCNC: 31 G/DL (ref 31.5–36.5)
MCHC RBC AUTO-ENTMCNC: 31.7 G/DL (ref 31.5–36.5)
MCHC RBC AUTO-ENTMCNC: 31.9 G/DL (ref 31.5–36.5)
MCHC RBC AUTO-ENTMCNC: 32.3 G/DL (ref 31.5–36.5)
MCV RBC AUTO: 92 FL (ref 78–100)
MCV RBC AUTO: 93 FL (ref 78–100)
METAMYELOCYTES # BLD MANUAL: 0.2 10E3/UL
METAMYELOCYTES # BLD MANUAL: 0.5 10E3/UL
METAMYELOCYTES NFR BLD MANUAL: 1 %
METAMYELOCYTES NFR BLD MANUAL: 4 %
MONOCYTES # BLD MANUAL: 0.5 10E3/UL (ref 0–1.3)
MONOCYTES # BLD MANUAL: 0.6 10E3/UL (ref 0–1.3)
MONOCYTES # BLD MANUAL: 0.9 10E3/UL (ref 0–1.3)
MONOCYTES # BLD MANUAL: 1.4 10E3/UL (ref 0–1.3)
MONOCYTES NFR BLD MANUAL: 3 %
MONOCYTES NFR BLD MANUAL: 4 %
MONOCYTES NFR BLD MANUAL: 7 %
MONOCYTES NFR BLD MANUAL: 9 %
MYELOCYTES # BLD MANUAL: 0.5 10E3/UL
MYELOCYTES # BLD MANUAL: 0.5 10E3/UL
MYELOCYTES NFR BLD MANUAL: 3 %
MYELOCYTES NFR BLD MANUAL: 4 %
NEUTROPHILS # BLD MANUAL: 10.2 10E3/UL (ref 1.6–8.3)
NEUTROPHILS # BLD MANUAL: 11.8 10E3/UL (ref 1.6–8.3)
NEUTROPHILS # BLD MANUAL: 12.8 10E3/UL (ref 1.6–8.3)
NEUTROPHILS # BLD MANUAL: 13.2 10E3/UL (ref 1.6–8.3)
NEUTROPHILS NFR BLD MANUAL: 77 %
NEUTROPHILS NFR BLD MANUAL: 78 %
NEUTROPHILS NFR BLD MANUAL: 83 %
NEUTROPHILS NFR BLD MANUAL: 89 %
NRBC # BLD AUTO: 0.1 10E3/UL
NRBC # BLD AUTO: 0.3 10E3/UL
NRBC BLD MANUAL-RTO: 1 %
NRBC BLD MANUAL-RTO: 2 %
O2/TOTAL GAS SETTING VFR VENT: 35 %
OXYHGB MFR BLD: 97 % (ref 92–100)
OXYHGB MFR BLD: 98 % (ref 92–100)
OXYHGB MFR BLD: 98 % (ref 92–100)
PCO2 BLD: 37 MM HG (ref 35–45)
PCO2 BLD: 38 MM HG (ref 35–45)
PCO2 BLD: 41 MM HG (ref 35–45)
PH BLD: 7.39 [PH] (ref 7.35–7.45)
PH BLD: 7.39 [PH] (ref 7.35–7.45)
PH BLD: 7.4 [PH] (ref 7.35–7.45)
PH BLD: 7.43 [PH] (ref 7.35–7.45)
PH BLD: 7.44 [PH] (ref 7.35–7.45)
PHOSPHATE SERPL-MCNC: 3.7 MG/DL (ref 2.5–4.5)
PHOSPHATE SERPL-MCNC: 3.9 MG/DL (ref 2.5–4.5)
PLAT MORPH BLD: ABNORMAL
PLATELET # BLD AUTO: 143 10E3/UL (ref 150–450)
PLATELET # BLD AUTO: 144 10E3/UL (ref 150–450)
PLATELET # BLD AUTO: 146 10E3/UL (ref 150–450)
PLATELET # BLD AUTO: 150 10E3/UL (ref 150–450)
PO2 BLD: 110 MM HG (ref 80–105)
PO2 BLD: 119 MM HG (ref 80–105)
PO2 BLD: 120 MM HG (ref 80–105)
PO2 BLD: 130 MM HG (ref 80–105)
PO2 BLD: 130 MM HG (ref 80–105)
POTASSIUM SERPL-SCNC: 3.5 MMOL/L (ref 3.4–5.3)
POTASSIUM SERPL-SCNC: 3.7 MMOL/L (ref 3.4–5.3)
POTASSIUM SERPL-SCNC: 3.9 MMOL/L (ref 3.4–5.3)
POTASSIUM SERPL-SCNC: 3.9 MMOL/L (ref 3.4–5.3)
RBC # BLD AUTO: 2.43 10E6/UL (ref 4.4–5.9)
RBC # BLD AUTO: 2.46 10E6/UL (ref 4.4–5.9)
RBC # BLD AUTO: 2.47 10E6/UL (ref 4.4–5.9)
RBC # BLD AUTO: 2.69 10E6/UL (ref 4.4–5.9)
RBC MORPH BLD: ABNORMAL
SODIUM SERPL-SCNC: 133 MMOL/L (ref 136–145)
SODIUM SERPL-SCNC: 135 MMOL/L (ref 136–145)
SODIUM SERPL-SCNC: 138 MMOL/L (ref 136–145)
SODIUM SERPL-SCNC: 139 MMOL/L (ref 136–145)
UFH PPP CHRO-ACNC: 0.19 IU/ML
UFH PPP CHRO-ACNC: 0.23 IU/ML
UFH PPP CHRO-ACNC: 0.24 IU/ML
UFH PPP CHRO-ACNC: 0.24 IU/ML
WBC # BLD AUTO: 13.3 10E3/UL (ref 4–11)
WBC # BLD AUTO: 14.4 10E3/UL (ref 4–11)
WBC # BLD AUTO: 15.1 10E3/UL (ref 4–11)
WBC # BLD AUTO: 15.9 10E3/UL (ref 4–11)

## 2022-07-17 PROCEDURE — 250N000011 HC RX IP 250 OP 636

## 2022-07-17 PROCEDURE — 85610 PROTHROMBIN TIME: CPT | Performed by: SURGERY

## 2022-07-17 PROCEDURE — 84100 ASSAY OF PHOSPHORUS: CPT | Performed by: THORACIC SURGERY (CARDIOTHORACIC VASCULAR SURGERY)

## 2022-07-17 PROCEDURE — 250N000013 HC RX MED GY IP 250 OP 250 PS 637: Performed by: STUDENT IN AN ORGANIZED HEALTH CARE EDUCATION/TRAINING PROGRAM

## 2022-07-17 PROCEDURE — 80048 BASIC METABOLIC PNL TOTAL CA: CPT | Performed by: SURGERY

## 2022-07-17 PROCEDURE — 82805 BLOOD GASES W/O2 SATURATION: CPT | Performed by: SURGERY

## 2022-07-17 PROCEDURE — 82330 ASSAY OF CALCIUM: CPT | Performed by: SURGERY

## 2022-07-17 PROCEDURE — 250N000011 HC RX IP 250 OP 636: Performed by: STUDENT IN AN ORGANIZED HEALTH CARE EDUCATION/TRAINING PROGRAM

## 2022-07-17 PROCEDURE — 71045 X-RAY EXAM CHEST 1 VIEW: CPT | Mod: 26 | Performed by: RADIOLOGY

## 2022-07-17 PROCEDURE — 93306 TTE W/DOPPLER COMPLETE: CPT | Mod: 26 | Performed by: INTERNAL MEDICINE

## 2022-07-17 PROCEDURE — 83051 HEMOGLOBIN PLASMA: CPT | Performed by: SURGERY

## 2022-07-17 PROCEDURE — 99291 CRITICAL CARE FIRST HOUR: CPT | Mod: 24 | Performed by: ANESTHESIOLOGY

## 2022-07-17 PROCEDURE — 85520 HEPARIN ASSAY: CPT | Performed by: SURGERY

## 2022-07-17 PROCEDURE — 250N000011 HC RX IP 250 OP 636: Performed by: SURGERY

## 2022-07-17 PROCEDURE — 87040 BLOOD CULTURE FOR BACTERIA: CPT | Performed by: SURGERY

## 2022-07-17 PROCEDURE — 250N000013 HC RX MED GY IP 250 OP 250 PS 637: Performed by: SURGERY

## 2022-07-17 PROCEDURE — 85379 FIBRIN DEGRADATION QUANT: CPT | Performed by: SURGERY

## 2022-07-17 PROCEDURE — 85730 THROMBOPLASTIN TIME PARTIAL: CPT | Performed by: SURGERY

## 2022-07-17 PROCEDURE — 84460 ALANINE AMINO (ALT) (SGPT): CPT | Performed by: SURGERY

## 2022-07-17 PROCEDURE — 36415 COLL VENOUS BLD VENIPUNCTURE: CPT | Performed by: SURGERY

## 2022-07-17 PROCEDURE — 85027 COMPLETE CBC AUTOMATED: CPT | Performed by: SURGERY

## 2022-07-17 PROCEDURE — 250N000011 HC RX IP 250 OP 636: Performed by: INTERNAL MEDICINE

## 2022-07-17 PROCEDURE — 250N000013 HC RX MED GY IP 250 OP 250 PS 637: Performed by: INTERNAL MEDICINE

## 2022-07-17 PROCEDURE — 93306 TTE W/DOPPLER COMPLETE: CPT

## 2022-07-17 PROCEDURE — 85300 ANTITHROMBIN III ACTIVITY: CPT | Performed by: SURGERY

## 2022-07-17 PROCEDURE — 94640 AIRWAY INHALATION TREATMENT: CPT | Mod: 76

## 2022-07-17 PROCEDURE — 83735 ASSAY OF MAGNESIUM: CPT | Performed by: SURGERY

## 2022-07-17 PROCEDURE — 83605 ASSAY OF LACTIC ACID: CPT | Performed by: SURGERY

## 2022-07-17 PROCEDURE — 250N000013 HC RX MED GY IP 250 OP 250 PS 637: Performed by: THORACIC SURGERY (CARDIOTHORACIC VASCULAR SURGERY)

## 2022-07-17 PROCEDURE — 250N000009 HC RX 250: Performed by: STUDENT IN AN ORGANIZED HEALTH CARE EDUCATION/TRAINING PROGRAM

## 2022-07-17 PROCEDURE — 71045 X-RAY EXAM CHEST 1 VIEW: CPT

## 2022-07-17 PROCEDURE — 85007 BL SMEAR W/DIFF WBC COUNT: CPT | Performed by: SURGERY

## 2022-07-17 PROCEDURE — 200N000002 HC R&B ICU UMMC

## 2022-07-17 PROCEDURE — 85384 FIBRINOGEN ACTIVITY: CPT | Performed by: SURGERY

## 2022-07-17 PROCEDURE — 82040 ASSAY OF SERUM ALBUMIN: CPT | Performed by: SURGERY

## 2022-07-17 PROCEDURE — 999N000015 HC STATISTIC ARTERIAL MONITORING DAILY

## 2022-07-17 PROCEDURE — 94003 VENT MGMT INPAT SUBQ DAY: CPT

## 2022-07-17 PROCEDURE — 250N000011 HC RX IP 250 OP 636: Performed by: THORACIC SURGERY (CARDIOTHORACIC VASCULAR SURGERY)

## 2022-07-17 PROCEDURE — 83735 ASSAY OF MAGNESIUM: CPT | Performed by: THORACIC SURGERY (CARDIOTHORACIC VASCULAR SURGERY)

## 2022-07-17 PROCEDURE — 85014 HEMATOCRIT: CPT | Performed by: SURGERY

## 2022-07-17 PROCEDURE — 80069 RENAL FUNCTION PANEL: CPT | Performed by: SURGERY

## 2022-07-17 PROCEDURE — 999N000157 HC STATISTIC RCP TIME EA 10 MIN

## 2022-07-17 PROCEDURE — 94640 AIRWAY INHALATION TREATMENT: CPT

## 2022-07-17 PROCEDURE — 99233 SBSQ HOSP IP/OBS HIGH 50: CPT | Mod: 25 | Performed by: INTERNAL MEDICINE

## 2022-07-17 RX ORDER — PROPOFOL 10 MG/ML
5-75 INJECTION, EMULSION INTRAVENOUS CONTINUOUS
Status: DISCONTINUED | OUTPATIENT
Start: 2022-07-17 | End: 2022-07-20

## 2022-07-17 RX ORDER — FUROSEMIDE 10 MG/ML
60 INJECTION INTRAMUSCULAR; INTRAVENOUS ONCE
Status: COMPLETED | OUTPATIENT
Start: 2022-07-17 | End: 2022-07-17

## 2022-07-17 RX ORDER — QUETIAPINE FUMARATE 50 MG/1
50 TABLET, FILM COATED ORAL 2 TIMES DAILY
Status: DISCONTINUED | OUTPATIENT
Start: 2022-07-17 | End: 2022-07-19

## 2022-07-17 RX ORDER — QUETIAPINE FUMARATE 100 MG/1
100 TABLET, FILM COATED ORAL AT BEDTIME
Status: DISCONTINUED | OUTPATIENT
Start: 2022-07-17 | End: 2022-07-26

## 2022-07-17 RX ORDER — OXYCODONE HYDROCHLORIDE 10 MG/1
10 TABLET ORAL EVERY 4 HOURS
Status: DISCONTINUED | OUTPATIENT
Start: 2022-07-17 | End: 2022-07-23

## 2022-07-17 RX ORDER — POTASSIUM CHLORIDE 29.8 MG/ML
20 INJECTION INTRAVENOUS ONCE
Status: COMPLETED | OUTPATIENT
Start: 2022-07-17 | End: 2022-07-17

## 2022-07-17 RX ORDER — HYDRALAZINE HYDROCHLORIDE 25 MG/1
25 TABLET, FILM COATED ORAL EVERY 8 HOURS SCHEDULED
Status: DISCONTINUED | OUTPATIENT
Start: 2022-07-17 | End: 2022-07-23

## 2022-07-17 RX ORDER — MAGNESIUM SULFATE HEPTAHYDRATE 40 MG/ML
2 INJECTION, SOLUTION INTRAVENOUS ONCE
Status: COMPLETED | OUTPATIENT
Start: 2022-07-17 | End: 2022-07-17

## 2022-07-17 RX ORDER — MAGNESIUM SULFATE HEPTAHYDRATE 40 MG/ML
2 INJECTION, SOLUTION INTRAVENOUS ONCE
Status: COMPLETED | OUTPATIENT
Start: 2022-07-17 | End: 2022-07-18

## 2022-07-17 RX ORDER — PROPOFOL 10 MG/ML
INJECTION, EMULSION INTRAVENOUS
Status: COMPLETED
Start: 2022-07-17 | End: 2022-07-17

## 2022-07-17 RX ORDER — POTASSIUM CHLORIDE 1.5 G/1.58G
20 POWDER, FOR SOLUTION ORAL ONCE
Status: COMPLETED | OUTPATIENT
Start: 2022-07-17 | End: 2022-07-17

## 2022-07-17 RX ADMIN — ACETAMINOPHEN 975 MG: 325 TABLET, FILM COATED ORAL at 05:29

## 2022-07-17 RX ADMIN — PROPOFOL 10 MCG/KG/MIN: 10 INJECTION, EMULSION INTRAVENOUS at 06:15

## 2022-07-17 RX ADMIN — MAGNESIUM SULFATE IN WATER 2 G: 40 INJECTION, SOLUTION INTRAVENOUS at 06:19

## 2022-07-17 RX ADMIN — OXYCODONE HYDROCHLORIDE 10 MG: 10 TABLET ORAL at 17:34

## 2022-07-17 RX ADMIN — POTASSIUM CHLORIDE 20 MEQ: 1.5 POWDER, FOR SOLUTION ORAL at 23:18

## 2022-07-17 RX ADMIN — HYDRALAZINE HYDROCHLORIDE 25 MG: 25 TABLET, FILM COATED ORAL at 14:07

## 2022-07-17 RX ADMIN — FUROSEMIDE 60 MG: 10 INJECTION, SOLUTION INTRAMUSCULAR; INTRAVENOUS at 18:25

## 2022-07-17 RX ADMIN — MULTIVITAMIN 15 ML: LIQUID ORAL at 07:48

## 2022-07-17 RX ADMIN — HYDRALAZINE HYDROCHLORIDE 25 MG: 25 TABLET, FILM COATED ORAL at 21:25

## 2022-07-17 RX ADMIN — HYDROXYCHLOROQUINE SULFATE 200 MG: 200 TABLET, FILM COATED ORAL at 21:25

## 2022-07-17 RX ADMIN — FUROSEMIDE 60 MG: 10 INJECTION, SOLUTION INTRAMUSCULAR; INTRAVENOUS at 10:20

## 2022-07-17 RX ADMIN — QUETIAPINE FUMARATE 100 MG: 100 TABLET ORAL at 21:26

## 2022-07-17 RX ADMIN — OXYCODONE HYDROCHLORIDE 10 MG: 10 TABLET ORAL at 14:07

## 2022-07-17 RX ADMIN — POTASSIUM CHLORIDE 20 MEQ: 29.8 INJECTION, SOLUTION INTRAVENOUS at 19:50

## 2022-07-17 RX ADMIN — ONDANSETRON 4 MG: 4 TABLET, ORALLY DISINTEGRATING ORAL at 21:24

## 2022-07-17 RX ADMIN — QUETIAPINE FUMARATE 50 MG: 50 TABLET ORAL at 16:08

## 2022-07-17 RX ADMIN — QUETIAPINE FUMARATE 25 MG: 25 TABLET ORAL at 07:49

## 2022-07-17 RX ADMIN — PIPERACILLIN AND TAZOBACTAM 4.5 G: 4; .5 INJECTION, POWDER, LYOPHILIZED, FOR SOLUTION INTRAVENOUS at 17:21

## 2022-07-17 RX ADMIN — ASPIRIN 81 MG CHEWABLE TABLET 81 MG: 81 TABLET CHEWABLE at 07:48

## 2022-07-17 RX ADMIN — DEXMEDETOMIDINE HYDROCHLORIDE 1.2 MCG/KG/HR: 4 INJECTION, SOLUTION INTRAVENOUS at 14:08

## 2022-07-17 RX ADMIN — PROPOFOL 50 MCG/KG/MIN: 10 INJECTION, EMULSION INTRAVENOUS at 23:18

## 2022-07-17 RX ADMIN — Medication 1 PACKET: at 21:26

## 2022-07-17 RX ADMIN — INSULIN GLARGINE 35 UNITS: 100 INJECTION, SOLUTION SUBCUTANEOUS at 07:56

## 2022-07-17 RX ADMIN — PIPERACILLIN AND TAZOBACTAM 4.5 G: 4; .5 INJECTION, POWDER, LYOPHILIZED, FOR SOLUTION INTRAVENOUS at 23:16

## 2022-07-17 RX ADMIN — Medication 1 PACKET: at 14:11

## 2022-07-17 RX ADMIN — OXYCODONE HYDROCHLORIDE 10 MG: 10 TABLET ORAL at 21:24

## 2022-07-17 RX ADMIN — Medication 40 MG: at 07:49

## 2022-07-17 RX ADMIN — Medication 1 PACKET: at 07:52

## 2022-07-17 RX ADMIN — INSULIN ASPART 1 UNITS: 100 INJECTION, SOLUTION INTRAVENOUS; SUBCUTANEOUS at 12:04

## 2022-07-17 RX ADMIN — FLUTICASONE PROPIONATE AND SALMETEROL XINAFOATE 2 PUFF: 115; 21 AEROSOL, METERED RESPIRATORY (INHALATION) at 08:44

## 2022-07-17 RX ADMIN — DEXMEDETOMIDINE HYDROCHLORIDE 1.2 MCG/KG/HR: 4 INJECTION, SOLUTION INTRAVENOUS at 05:29

## 2022-07-17 RX ADMIN — OXYCODONE HYDROCHLORIDE 10 MG: 10 TABLET ORAL at 10:20

## 2022-07-17 RX ADMIN — Medication 200 MCG/HR: at 07:39

## 2022-07-17 RX ADMIN — ACETAMINOPHEN 975 MG: 325 TABLET, FILM COATED ORAL at 21:24

## 2022-07-17 RX ADMIN — HEPARIN SODIUM AND DEXTROSE 1100 UNITS/HR: 10000; 5 INJECTION INTRAVENOUS at 14:09

## 2022-07-17 RX ADMIN — HYDRALAZINE HYDROCHLORIDE 10 MG: 10 TABLET, FILM COATED ORAL at 05:30

## 2022-07-17 RX ADMIN — PIPERACILLIN AND TAZOBACTAM 4.5 G: 4; .5 INJECTION, POWDER, LYOPHILIZED, FOR SOLUTION INTRAVENOUS at 05:25

## 2022-07-17 RX ADMIN — PIPERACILLIN AND TAZOBACTAM 4.5 G: 4; .5 INJECTION, POWDER, LYOPHILIZED, FOR SOLUTION INTRAVENOUS at 11:23

## 2022-07-17 RX ADMIN — FLUTICASONE PROPIONATE AND SALMETEROL XINAFOATE 2 PUFF: 115; 21 AEROSOL, METERED RESPIRATORY (INHALATION) at 20:28

## 2022-07-17 RX ADMIN — HYDROXYCHLOROQUINE SULFATE 200 MG: 200 TABLET, FILM COATED ORAL at 07:48

## 2022-07-17 RX ADMIN — Medication 5 MG: at 21:23

## 2022-07-17 RX ADMIN — PROPOFOL 30 MCG/KG/MIN: 10 INJECTION, EMULSION INTRAVENOUS at 14:10

## 2022-07-17 RX ADMIN — MAGNESIUM SULFATE IN WATER 2 G: 40 INJECTION, SOLUTION INTRAVENOUS at 23:18

## 2022-07-17 RX ADMIN — ACETAMINOPHEN 975 MG: 325 TABLET, FILM COATED ORAL at 14:07

## 2022-07-17 RX ADMIN — HYDROMORPHONE HYDROCHLORIDE 0.4 MG: 0.2 INJECTION, SOLUTION INTRAMUSCULAR; INTRAVENOUS; SUBCUTANEOUS at 05:33

## 2022-07-17 RX ADMIN — DULOXETINE 30 MG: 30 CAPSULE, DELAYED RELEASE ORAL at 07:48

## 2022-07-17 ASSESSMENT — ACTIVITIES OF DAILY LIVING (ADL)
ADLS_ACUITY_SCORE: 38

## 2022-07-17 NOTE — PROGRESS NOTES
VAD Shift Summary: [id number]    VAD Equipment:  Primary console serial number: [id number]  Backup console serial number: [id number]  Circuit lot number: [id number]  Pump head lot number: [id number]      Patient remains on RVAD via Centrimag, all equipment is functioning and alarms are appropriately set. RPM's 3600 with flow range 2.93-3.02 L/min (HeartMate LVAD flow has been ~3.2-3.3 LPM). Circuit remains free of air. Stable scattered fibrin buildup noted at circuit connectors and a few areas of heavier fibrin/clot buildup in tubing and at return cannula connector. RIJ Protek Duo cannnula is secure with no bleeding from site. Extremities are pale, but perfused.   Backup console on, emergency change out supplies at bedside (including spare pump head, sterile scissors, 2 connectors).     Significant Shift Events:   None. Patient very agitated and restless overnight. Ultimately, propofol was started around 0600 to keep patient from inadvertently dislodging Protek cannula or ETT.    Vent settings:  Vent Mode: CMV/AC  (Continuous Mandatory Ventilation/ Assist Control)  FiO2 (%): 35 %  Resp Rate (Set): 12 breaths/min  Tidal Volume (Set, mL): 450 mL  PEEP (cm H2O): 5 cmH2O  Pressure Support (cm H2O): 7 cmH2O  Resp: 13    Anticoagulation and Volume Status:  Anticoagulation is heparin and currently is running at 1100 unit(s)/hr with Anti-Xa goal of 0.15-0.35. Last check was 0.24. Titration by bedside RN.     Urine output is low, blood loss was low. No product given.      Recent Imaging:  No results found for this or any previous visit (from the past 24 hour(s)).    Labs:  Recent Labs   Lab 07/17/22  0408 07/16/22  2135 07/16/22  1542 07/16/22  1211   PH 7.40 7.41 7.42 7.44   PCO2 38 37 36 34*   PO2 130* 123* 129* 138*   HCO3 24 23 23 23   O2PER 35 35 35 35       Lab Results   Component Value Date    HGB 8.0 (L) 07/17/2022    PHGB <30 07/17/2022     (L) 07/17/2022    FIBR 523 (H) 07/17/2022    INR 1.11 07/17/2022      (H) 07/17/2022    DD 3.76 (H) 07/17/2022    ANTCH 93 07/16/2022       Plan:   Plan is to continue RVAD support per MD-directed goals.      Cherelle Agustin, RT  ECMO Specialist  7/17/2022   6:08 AM       Dosing Month 1 (Required For Cumulative Dosing): 40mg Daily What Is The Patient's Gender: Male Counseling Text: I reviewed the side effect in detail. Patient should get monthly blood tests, not donate blood, not drive at night if vision affected, and not share medication. Hypertriglyceridemia Monitoring: I explained this is common when taking isotretinoin. We will monitor closely. Female Completion Statement: After discussing her treatment course we decided to discontinue isotretinoin therapy at this time. I explained that she would need to continue her birth control methods for at least one month after the last dosage. She should also get a pregnancy test one month after the last dose. She shouldn't donate blood for one month after the last dose. She should call with any new symptoms of depression. Xerosis Normal Treatment: I recommended application of Cetaphil or CeraVe numerous times a day going to bed to all dry areas. Use Enhanced Counseling Feature (Automatic): No Retinoid Dermatitis Normal Treatment: I recommended more frequent application of Cetaphil or CeraVe to the areas of dermatitis. Kilograms Preamble Statement (Weight Entered In Details Tab): Reported Weight in kilograms: Display Individual Monthly Dosage In The Note (If Yes Will Display All Dosages Which Are Not N/A): yes Detail Level: Zone Pounds Preamble Statement (Weight Entered In Details Tab): Reported Weight in pounds: Nosebleeds Normal Treatment: I explained this is common when taking isotretinoin. I recommended saline mist in each nostril multiple times a day. If this worsens they will contact us. Cheilitis Aggressive Treatment: I recommended application of Vaseline or Aquaphor numerous times a day (as often as every hour) and before going to bed. I also prescribed a topical steroid for twice daily use. Female Pregnancy Counseling Text: Female patients should also be on two forms of birth control while taking this medication and for one month after their last dose. Myalgia Monitoring: I explained this is common when taking isotretinoin. If this worsens they will contact us. Headache Monitoring: I recommended monitoring the headaches for now. There is no evidence of increased intracranial pressure. They were instructed to call if the headaches are worsening. Weight Units: pounds Dosing Month 3 (Required For Cumulative Dosing): 30mg BID Myalgia Treatment: I explained this is common when taking isotretinoin. If this worsens they will contact us. They may try OTC ibuprofen. Male Completion Statement: After discussing his treatment course we decided to discontinue isotretinoin therapy at this time. He shouldn't donate blood for one month after the last dose. He should call with any new symptoms of depression. Months Of Therapy Completed: 4 Xerosis Aggressive Treatment: I recommended application of Cetaphil or CeraVe numerous times a day and before going to bed to all dry areas. I also prescribed a topical steroid for twice daily use. Xerosis Normal Treatment: I recommended application of Cetaphil or CeraVe numerous times a day and before going to bed to all dry areas. Xerosis Aggressive Treatment: I recommended application of Cetaphil or CeraVe numerous times a day going to bed to all dry areas. I also prescribed a topical steroid for twice daily use. Patient Weight (Optional But Required For Cumulative Dose-Numbers And Decimals Only): 130 Retinoid Dermatitis Aggressive Treatment: I recommended more frequent application of Cetaphil or CeraVe to the areas of dermatitis. I also prescribed a topical steroid for twice daily use until the dermatitis resolves. Cheilitis Normal Treatment: I recommended application of Vaseline or Aquaphor numerous times a day (as often as every hour) and before going to bed. Comments: Will do 20mg morning and night due to joint pain Lower Range (In Mg/Kg): 120 Target Cumulative Dosage (In Mg/Kg): 135 Upper Range (In Mg/Kg): 150

## 2022-07-17 NOTE — PROGRESS NOTES
Pt remains on the ventilator.  Pressure supported for 3 hrs this shift.  Sedation vacation done and pt did not follow any commands; moves all extremities.  Family at the bedside and updated on POC.  Remains sedated, see MAR.  60 mgs lasix X 2 this shift (beginning and end of shift).  Pt up in the recliner.  For vital signs and complete assessments, see documentation flow sheet.  Will continue to monitor.

## 2022-07-17 NOTE — PLAN OF CARE
Nights:  PERLA  Pt opens eyes, tracks about 50% of time.  Follows commands <25% of time.  FINCH.  Afebrile with clear lungs.  , MAP 80's  CMV 35%/12/450/5.  LVAD/RVAD numbers appropriate with no alarms.  Fitzgerald 30cc/hr.  CT 10-20cc/hr each container.  Mg+ replaced.  Very restless at 0600 pulling at cords and throwing legs over edge of bed. Vitals elevated.  Propofol started with immediate relief.   P  Increase activity.  Pressure support, reduce sedation as tolerated.

## 2022-07-17 NOTE — PROGRESS NOTES
[facility]    Cardiology Progress Note- Cards 2        Date of Admission:  6/17/2022     Assessment & Plan: HVSL   Dandy EDUARD Sands is a 61 year old male with PMH of lupus, antiphospholipid syndrome, HTN, HLD, HFrEF 2/2 ICM who presented with cardiogenic shock c/b multiorgan failure (JOSE, shock liver) requiring mechanical support with IABP, now s/p HM3 LVAD 7/8/22 by Dr. Zamora with intraoperative course c/b RV failure s/p 29F Protek duo via RIJ. Post op course c/b hemopericardium s/p repeat washout with chest left open. Chest now closed 7/13/22     #HFrEF 2/2 ICM s/p HM3 LVAD in setting of cardiogenic shock (SCAI D)  #RV failure s/p Protek duo temporary RVAD  #Post chest closure hemopericardium s/p repeat washout  Patient with ICM s/p HM3 LVAD 7/8/22 by Dr. Zamora in setting of presentation for cardiogenic shock requiring MCS with IABP as prior to HM (initially evaluated for heart transplant, however noted to have substantially elevated DSAs during course of care, so decision made to proceed with LVAD given patient already on temporary MCS). Intraoperative course c/b RV failure resulting in cardiogenic shock requiring high dose pressors necessitating RVAD support. Chest closed 7/11 and Ashli weaned. However developed tachycardia, lactic acidosis and decreased hemoglobin with CT scan noting hemopericardium. Returned to OR where underwent washout with large clot burden noted over the right atrium and around LVAD outflow graft. Chest left open and returned to ICU where pressors were able to be weaned. Noted to have stable RVAD/LVAD flows throughout and post procedurally. Ultimately returned to OR for repeat closure. Currently hemodynamically stable with end-organ function and hemoglobin. Attempting to wean sedation with goal for pressure support and possible extubation.   -LVAD: continues to have good flows with no alarms and stable end organ function off pressors;  will continue at current speed  -RVAD: no evidence of increasing hemolysis, flows appear balanced, oxygenating well; since bedside echo showed only mildly reduced RV function, reduced RV flow from 3.5 to 3 L  -AC, antiplatelets: per surgical service; has had some post-operative bleeding and prior hemopericardium; will need to monitor coags closely in setting of heparin and ASA use  -Volume status: appears euvolemic  -rhythm: device interrogation done on 7/15 and noted to be sinus  -continue oral hydralazine for MAP control with hold parameters for MAP<80      The patient's care was discussed with the Attending Physician, Dr. Mendez and Bedside Nurse.    Keri Hadley MD  [facility]    ______________________________________________________________________    Interval History   Nursing notes reviewed. Received 1 unit pRBCs overnight.     Data reviewed today: I reviewed all medications, new labs and imaging results over the last 24 hours    Physical Exam   Vital Signs: Temp: 97.3  F (36.3  C) Temp src: Esophageal BP: (!) 110/91 Pulse: 76   Resp: 13 SpO2: 99 % O2 Device: Mechanical Ventilator    Weight: 178 lbs 2.11 oz  General Appearance: Intubated, sedated male in ICU bed  Respiratory: ventilated lung sounds, clear anterior breath sounds  Cardiovascular: vad hum, driveline site c/d/i, protek in place, chest incisions c/d/i  GI: soft, bs active   Skin: warm, well perfused, no lower extremity edema  Neuro: sedated, intubated, occasionally spontaneously opens eyes    Data   Recent Labs   Lab 07/16/22  1547 07/16/22  1541 07/16/22  1114 07/16/22  0944 07/16/22  0821 07/16/22  0452 07/15/22  0352 07/15/22  0345   WBC  --  19.3*  --  17.3*  --  19.6*   < > 17.8*   HGB  --  7.9*  --  7.6*  --  8.4*   < > 7.7*   MCV  --  91  --  90  --  89   < > 89   PLT  --  138*  --  123*  --  131*   < > 120*   INR  --  1.14  --  1.15  --  1.12   < > 1.23*   NA  --  138  --  138  --  137   <  > 135*   POTASSIUM  --  4.1  --  3.9  --  4.5   < > 4.0   CHLORIDE  --  107  --  107  --  107   < > 107   CO2  --  21*  --  22  --  20*   < > 19*   BUN  --  32.0*  --  31.3*  --  29.1*   < > 26.3*   CR  --  0.71  --  0.68  --  0.67   < > 0.58*   ANIONGAP  --  10  --  9  --  10   < > 9   ELDA  --  8.2*  --  8.1*  --  8.4*   < > 7.9*   * 136* 146* 157*   < > 169*   < > 119*   ALBUMIN  --   --   --   --   --  2.4*  --  2.3*   ALT  --   --   --   --   --  17  --  20    < > = values in this interval not displayed.       Medications     dexmedetomidine 1.2 mcg/kg/hr (07/16/22 1900)     dextrose       fentaNYL 150 mcg/hr (07/16/22 1900)     HEParin 1,100 Units/hr (07/16/22 1900)       acetaminophen  975 mg Oral or Feeding Tube Q8H CAMMY     aspirin  81 mg Oral or Feeding Tube Daily     DULoxetine  30 mg Oral Daily     fluticasone-salmeterol  2 puff Inhalation BID     hydrALAZINE  10 mg Oral or Feeding Tube Q8H [facility]     hydroxychloroquine  200 mg Oral BID     insulin aspart  1-12 Units Subcutaneous Q4H     insulin glargine  35 Units Subcutaneous QAM AC     melatonin  5 mg Oral QPM     multivitamins w/minerals  15 mL Per Feeding Tube Daily     pantoprazole  40 mg Per Feeding Tube QAM AC     piperacillin-tazobactam  4.5 g Intravenous Q6H     polyethylene glycol  17 g Oral or Feeding Tube Daily     protein modular  1 packet Per Feeding Tube TID     QUEtiapine  25 mg Oral or Feeding Tube BID     QUEtiapine  50 mg Oral or Feeding Tube At Bedtime     senna-docusate  1 tablet Oral or Feeding Tube BID     sodium chloride (PF)  3 mL Intracatheter Q8H     sodium chloride (PF)  3 mL Intracatheter Q8H

## 2022-07-17 NOTE — PROGRESS NOTES
ECMO Shift Summary:      ECMO Equipment:  Primary console serial number: [id number]  Backup console serial number: [id number]  Circuit lot number: [id number]  Pump head lot number: [id number]    Patient remains on RVAD with Centrimag, all equipment is functioning and alarms are appropriately set. RPM's 3100 with flow range 2.4-2.6 L/min. Circuit remains free of air. Clot and fibrin at connectors remains stable. Cannulas are secure with no bleeding from site. Extremities are pale, dry.    Significant Shift Events: RVAD flow weaned to 2.5 L/min.    Vent settings:  Vent Mode: CMV/AC  (Continuous Mandatory Ventilation/ Assist Control)  FiO2 (%): 35 %  Resp Rate (Set): 12 breaths/min  Tidal Volume (Set, mL): 450 mL  PEEP (cm H2O): 5 cmH2O  Pressure Support (cm H2O): 7 cmH2O  Resp: (!) 33    Heparin is running at 1100 u/hr, XA within range at 0.24.    Intake/Output Summary (Last 24 hours) at 7/17/2022 1646  Last data filed at 7/17/2022 1600  Gross per 24 hour   Intake 2594.25 ml   Output 2535 ml   Net 59.25 ml       CXR:  Recent Results (from the past 24 hour(s))   XR Chest Port 1 View    Narrative    Exam: XR CHEST PORT 1 VIEW, 7/17/2022 10:06 AM    Indication: RVAD/LVAD    Comparison: Chest radiograph 7/16/2022    Findings:   Portable AP chest radiograph. Postsurgical changes of the chest with  intact median sternotomy wires. Endotracheal tube tip projects over  the low thoracic trachea. Enteric tubes course below the diaphragm and  beyond the field-of-view. Right IJ RVAD tip projects over the main  portal trunk. Right upper extremity PICC tip in the region of the mid  SVC. Stable left chest wall cardiac device and the LVAD. Stable  mediastinal drains and left basilar chest tube.    Stable cardiac mediastinal silhouette. Similar small pleural  effusions. No appreciable pneumothorax. Low lung volumes with  continued bilateral diffuse mixed interstitial and airspace opacities.      Impression    Impression:   1.   Stable support devices.  2.  No significant change in small pleural effusions and diffuse  pulmonary opacities.    MILES KENNY MD         SYSTEM ID:  [id number]   Echo Complete   Result Value    LVEF  10-20% (severely reduced)    [facility]    [id number]  [id number]  [id number]  [id number]^DARRELL^MARCELLA^PAOLA     [facility],[place]  Echocardiography Laboratory  [address] [place] [address]     Name: OLEG KAUR  MRN: [id number]  : 1960  Study Date: 2022 10:46 AM  Age: 61 yrs  Gender: Male  Patient Location: [facility]  Reason For Study: Heart Failure  Ordering Physician: MARCELLA RAMÍREZ  Performed By: Saba Prado     BSA: 1.9 m2  Height: 67 in  Weight: 179 lb  HR: 114  BP: 110/91 mmHg  ______________________________________________________________________________  Procedure  Complete Portable Echo Adult.  ______________________________________________________________________________  Interpretation Summary  HM3 LVAD at 5200 RPM and Protek Duo RVAD at 4.3L/min.  Normal LV size with severely reduced global LV function, LVEF<20%. LVIDd= 4.6  cm.  Normal RV size and mildly reduced RV function, RVFAC 31%. The ventricular  septum is midline.  The aortic valve opens minimally with each beat. There is no AI.  LVAD inflow cannula is visualized in the LV apex. Normal Doppler interrogation  of the LVAD inflow cannula.  LVAD outflow graft is not well assessed.  Organized material is present in the pericardial space, most prominently  anterior to the RV free wall. There is no evidence of pericardial tamponade.  This was also present on the 22 study, but is better imaged on today's  study.     This study was compared with the study from 22: Image quality is  significantly improved. There has otherwise been no significant change,  however the prior study was extremely limited.  ______________________________________________________________________________  Left  Ventricle  Left ventricular size is normal. Diastolic function not assessed due to  presence of LVAD. Left ventricular function is decreased. The ejection  fraction is 10-20% (severely reduced). Severe diffuse hypokinesis is present.  LVAD cannula was seen in the expected anatomic position in the LV apex.     Right Ventricle  The right ventricle is normal size. Global right ventricular function is  mildly reduced. RVFAC 31%.     Atria  The atria cannot be assessed.     Mitral Valve  The mitral valve is normal.     Aortic Valve  The aortic valve opens minimally with each beat. There is no AI.     Tricuspid Valve  Moderate tricuspid insufficiency is present. PA pressure cannot be accurately  assessed due to significant TR.     Pulmonic Valve  The valve leaflets are not well visualized. Trace pulmonic insufficiency is  present.     Vessels  The aorta root is normal. Dilation of the inferior vena cava is present with  normal respiratory variation in diameter. Unable to assess mean RA pressure  given the patient is on a ventilator.     Pericardium  Organized material is present in the pericardial space, most prominently  anterior to the RV free wall. There is no evidence of pericardial tamponade.  This was also present on the 22 study, but is better imaged on today's  study.     Compared to Previous Study  This study was compared with the study from 22 . Image quality is  significantly improved. There has otherwise been no significant change,  however the prior study was extremely limited.  ______________________________________________________________________________  Doppler Measurements & Calculations  TR max chacorta: 150.9 cm/sec  TR max P.1 mmHg     ______________________________________________________________________________  Report approved by: Nakia Prakash 2022 12:02 PM             Labs:  Recent Labs   Lab 22  1555 22  1158 22  0758 22  0408   PH 7.43 7.39 7.39  7.40   PCO2 37 41 38 38   PO2 119* 130* 120* 130*   HCO3 25 24 23 24   O2PER 35 35 35 35       Lab Results   Component Value Date    HGB 7.2 (L) 07/17/2022    PHGB <30 07/17/2022     (L) 07/17/2022    FIBR 501 (H) 07/17/2022    INR 1.25 (H) 07/17/2022     (H) 07/17/2022    DD 3.63 (H) 07/17/2022    ANTCH 93 07/17/2022         Plan is to continue support.      Alana Hinojosa, RT  ECMO Specialist  7/17/2022 4:46 PM

## 2022-07-17 NOTE — PROGRESS NOTES
CV ICU PROGRESS NOTE  07/17/2022        CO-MORBIDITIES  1. Cardiogenic shock (H)  (primary encounter diagnosis)  2. Ischemic cardiomyopathy  3. Heart failure with reduced ejection fraction, NYHA class III (H)  4. Coronary artery disease involving native coronary artery of native heart without angina pectoris      ASSESSMENT Dandy Sands is a 61 year old male with a PMH of CAD s/p PCI to LAD, MI, ICM, acute on chronic heart failure, s/p CRT-D, severe MR, antiphospholipid antibody syndrome on chronic warfarin, SLE, HTN, HLD, recent hospitalization 5/16-5/26 s/p RCA, LAD stenting who underwent RVAD (29F Protek duo RIJ) LVAD placement (HeartMate 3) on 7/8/22 by Dr. Zamora. Intraoperative course complicated by IABP dysfunction and RV failure, requiring crash onto bypass / vasopressor support, NO, and protek placement. Now s/p chest closure and NO wean 7/11. Return to OR for hemopericardium (7/12) and chest re-closure 7/13.    Today's Progress:  - CXR  - Turn RVAD down 0.5L  - Formal TTE  - 60mg lasix x1 goal net -0.5L-1L  - Increase hydralazine to 25mg Q8h  - Increase seroquel 50-  - Oxycodone 10mg Q4H and wean IV fentanyl drip  - Wean off propofol gtt that was added overnight for increased agitation    PLAN:   Neuro/ pain/ sedation:  #Acute Postoperative pain  #Depression   #Anxiety due to a medical condition  #Insomnia  - Monitor neurological status. Notify the MD for any acute changes in exam.  - Pain: fentanyl gtt. Scheduled tylenol, scheduling oxycodone 10mg q4h. PRN tylenol, oxycodone, dilaudid.  - Sedation: propofol gtt to be weaned off, RASS 0 to -1  - Duloxetine 30mg  - Add Melatonin 10 mg HS  - Seroquel 50 / 50 / 100  - Holding PTA meds: hydroxyzine 25mg PRN, zolpidem 5mg, melatonin 3mg, lorazepam 0.5mg    Pulmonary:   #Acute hypoxic respiratory failure on iMV  #Mild-moderate emphysema  #History of COVID-19  - Off nitric 7/12  - Extubated 7/16   2L NC    Cardiovascular:    #S/p LVAD placement  (HeartMate 3) on 22   #S/p RVAD (29F Protek duo RIJ) on 22  #S/p chest closure   #Cardiogenic shock  #Advanced ischemic cardiomyopathy, class IV, stage D  #CAD s/p PCI to LAD ()  #S/p CRT-D ()  #History of MI ()  #Severe MR  #Antiphospholipid antibody syndrome on chronic warfarin  #HTN, HLD  #Recent hospitalization - s/p RCA, LAD stenting  #Atrial fibrillation   - Monitor hemodynamic status. Chest closure and oxygenator splicing . Reopened  for I&D and left open, plan for closure .  - Goal MAP > 65   - PTA meds: atorvastatin 40mg, clopidogrel 75mg, lasix 40mg, warfarin 5mg  - LVAD management per Advanced HF service   - flows stable, circuit functioning w/o clots/fibrin  - AC Plan: straight rate 800U/hr   - Hydralazine 25mg q8h  - Hydralazine prn MAP >85  - Formal TTE      GI / Nutrition:   #GERD  #Congestive hepatopathy in the setting of cardiac insuffiency  - NPO 2/2 intubation   - Titrate NJT to goal  - Bowel regimen (miralax / senna)  - Nutrition consulted  - Trend LFT's    Hematemesis / oral mucosal bleedin/12 x2, 7/13 x1 (oral, around ETT/ETT clear).   - continue PPI BID x 72 hours (7/15 change after 12:00 noon)   - hgb stable   - OG without patience bloody output     Renal/ Fluid Balance:    - Strict I/O, daily weights   - Avoid/limit nephrotoxins as able  - Replete lytes PRN per protocol  - PTA meds: tamsulosin 0.4mg (held)  - Fluid goal net even      Endocrine:    #Stress induced hyperglycemia  Preop A1c 5.5%  - Continue Insulin gtt perioperatively   - Goal BG <180 for optimal healing       ID/ Antibiotics:  #Periopearive antibiosis (s/p course of Cefepime, vancomycin, rifampin, fluconazole)  #Enterococcus faecalis PNA  - Discontinue Cefepime and Vancomycin (-), chest now closed   - Trend fever curve   - Pan culture (): Bcx x2, UA/UC -> negative     - Endotracheal sputum culture (4+ candida albicans, 1+ E.Coli, 3+ enterococcus faecalis)  -  Zosyn for HAP    Heme:     #Acute blood loss anemia  #Acute blood loss thrombocytopenia  #History of DVT and PE   #Antiphospholipid antibody syndrome  #History of iron deficiency anemia  - Monitor for s/sx of bleeding  - s/p 3u pRBC, 2u Plt 7/12 perioperative needs  - transfused 2 units 7/15  - Trend CBC and coags.  - Hgb goal > 8  - Plt goal > 20    Rheumatology:  #SLE  - Chronic, symptoms include arthritis, photosensitivity, fatigue, and skin manifestations  - PTA meds: hydroxychloroquine 200mg, resumed 7/16   Will need cellcept restarted when appropriate     Prophylaxis:    - DVT prophylaxis: SCD + low intensity heparin gtt   - PPI  - Bowel regimen  - PT/OT    Lines / Tubes / Drains:  - R brachial triple lumen PICC  - L upper arm PIV  - Left radial A-line       Disposition:  - CVICU.    Patient seen, findings and plan discussed with CV ICU staff, Dr. Samano.    Medhat Rosenthal MD  Surgery PGY-3    ----------------------------------------------------------------      Subjective:  No acute events.     OBJECTIVE:   1. VITAL SIGNS:   Temp:  [97.3  F (36.3  C)-99.7  F (37.6  C)] 98.8  F (37.1  C)  Pulse:  [] 112  Resp:  [9-32] 14  BP: (110)/(91) 110/91  MAP:  [64 mmHg-114 mmHg] 99 mmHg  Arterial Line BP: ()/() 105/90  FiO2 (%):  [35 %] 35 %  SpO2:  [77 %-100 %] 98 %  Vent Mode: (S) CPAP/PS  (Continuous positive airway pressure with Pressure Support)  FiO2 (%): 35 %  Resp Rate (Set): 12 breaths/min  Tidal Volume (Set, mL): 450 mL  PEEP (cm H2O): 5 cmH2O  Pressure Support (cm H2O): 7 cmH2O  Resp: 14      2. INTAKE/ OUTPUT:   I/O last 3 completed shifts:  In: 2649.4 [I.V.:1199.4; NG/GT:250]  Out: 1630 [Urine:700; Emesis/NG output:150; Stool:400; Chest Tube:380]    3. PHYSICAL EXAMINATION:   General: critically ill  Neuro: sedated lightly but hoping to wean today if agitation remains stable  Resp: pressure support this AM  CV: Sinus tachy  Abdomen: Soft, Non-distended, Non-tender  Incisions:  c/d/i  Extremities: warm and well perfused    4. INVESTIGATIONS:   Arterial Blood Gases   Recent Labs   Lab 07/17/22  0758 07/17/22  0408 07/16/22 2135 07/16/22  1542   PH 7.39 7.40 7.41 7.42   PCO2 38 38 37 36   PO2 120* 130* 123* 129*   HCO3 23 24 23 23     Complete Blood Count   Recent Labs   Lab 07/17/22  1006 07/17/22  0408 07/16/22 2134 07/16/22  1541   WBC 15.9* 15.1* 15.4* 19.3*   HGB 7.1* 8.0* 7.9* 7.9*   * 146* 147* 138*     Basic Metabolic Panel  Recent Labs   Lab 07/17/22  1006 07/17/22  0813 07/17/22 0408 07/16/22 2134 07/16/22  1547 07/16/22  1541   *  --  133* 139  --  138   POTASSIUM 3.9  --  3.9 3.9  --  4.1   CHLORIDE 105  --  103 107  --  107   CO2 22  --  22 21*  --  21*   BUN 30.8*  --  29.4* 30.1*  --  32.0*   CR 0.58*  --  0.67 0.65*  --  0.71   * 138* 129* 127*   < > 136*    < > = values in this interval not displayed.     Liver Function Tests  Recent Labs   Lab 07/17/22  1006 07/17/22  0408 07/16/22 2134 07/16/22  1541 07/16/22  0944 07/16/22  0452 07/15/22  1027 07/15/22  0345 07/14/22  0953 07/14/22  0337   ALT  --  13  --   --   --  17  --  20  --  23   ALBUMIN  --  2.6*  --   --   --  2.4*  --  2.3*  --  2.3*   INR 1.15 1.11 1.09 1.14   < > 1.12   < > 1.23*   < > 1.21*    < > = values in this interval not displayed.     Pancreatic Enzymes  No lab results found in last 7 days.  Coagulation Profile  Recent Labs   Lab 07/17/22  1006 07/17/22 0408 07/16/22  2134 07/16/22  1541   INR 1.15 1.11 1.09 1.14   * 118* 120* 132*         5. RADIOLOGY:   No results found for this or any previous visit (from the past 24 hour(s)).    =========================================

## 2022-07-17 NOTE — PROGRESS NOTES
[facility]    Cardiology Progress Note- Cards 2        Date of Admission:  6/17/2022     Assessment & Plan: HVSL   Dandy EDUARD Sands is a 61 year old male with PMH of lupus, antiphospholipid syndrome, HTN, HLD, HFrEF 2/2 ICM who presented with cardiogenic shock c/b multiorgan failure (JOSE, shock liver) requiring mechanical support with IABP, now s/p HM3 LVAD 7/8/22 by Dr. Zamora with intraoperative course c/b RV failure s/p 29F Protek duo via RIJ. Post op course c/b hemopericardium s/p repeat washout with chest left open. Chest now closed 7/13/22     #HFrEF 2/2 ICM s/p HM3 LVAD in setting of cardiogenic shock (SCAI D)  #RV failure s/p Protek duo temporary RVAD  #Post chest closure hemopericardium s/p repeat washout  Patient with ICM s/p HM3 LVAD 7/8/22 by Dr. Zamora in setting of presentation for cardiogenic shock requiring MCS with IABP as prior to HM (initially evaluated for heart transplant, however noted to have substantially elevated DSAs during course of care, so decision made to proceed with LVAD given patient already on temporary MCS). Intraoperative course c/b RV failure resulting in cardiogenic shock requiring high dose pressors necessitating RVAD support. Chest closed 7/11 and Ashli weaned. However developed tachycardia, lactic acidosis and decreased hemoglobin with CT scan noting hemopericardium. Returned to OR where underwent washout with large clot burden noted over the right atrium and around LVAD outflow graft. Chest left open and returned to ICU where pressors were able to be weaned. Noted to have stable RVAD/LVAD flows throughout and post procedurally. Ultimately returned to OR for repeat closure. Currently hemodynamically stable with stable end-organ function and hemoglobin. Attempting to wean sedation with goal for pressure support and possible extubation.   -LVAD: continues to have good flows with few alarms with ild speed drops to 5050 from 5200  and stable end organ function off pressors; will continue at current speed  -RVAD: no evidence of increasing hemolysis, flows appear balanced, oxygenating well --> Decrease RVAD flow by 0.5 L to 2.5 L. When RVAD at 2 L, will consider adding milrinone. Echo showed RV function mildly reduced  -AC, antiplatelets: per surgical service; has had some post-operative bleeding and prior hemopericardium; will need to monitor coags closely in setting of heparin and ASA use  -Volume status: appears hypervolemic - gave lasix 60 mg IV, goal net out 0.5-1 L  -rhythm: device interrogation done on 7/15 and noted to be sinus  -hypertension: increased hydralazine to 25 mg q8h    Of note, when appropriate, consider resuming patient's Cellcept.      The patient's care was discussed with the Attending Physician, Dr. Mendez and Bedside Nurse.    Keri Hadley MD  [facility]    ______________________________________________________________________    Interval History   Nursing notes reviewed. Started on propofol overnight for agitation.    Data reviewed today: I reviewed all medications, new labs and imaging results over the last 24 hours    Physical Exam   Vital Signs: Temp: 98.4  F (36.9  C) Temp src: Esophageal BP: (!) 80/68 Pulse: 103   Resp: (!) 33 SpO2: 91 % O2 Device: Mechanical Ventilator    Weight: 179 lbs 10.8 oz  General Appearance: Intubated, sedated male in ICU bed  Respiratory: ventilated lung sounds, clear anterior breath sounds  Cardiovascular: vad hum, driveline site c/d/i, protek in place, chest incisions c/d/i, JVD ~15 cm  GI: soft, bs active   Skin: warm, well perfused, 2+ pitting edema to the mid-tibia  Neuro: sedated, intubated    Data   Recent Labs   Lab 07/17/22  1603 07/17/22  1554 07/17/22  1201 07/17/22  1006 07/17/22  0813 07/17/22  0408 07/16/22  2134 07/16/22  0821 07/16/22  0452   WBC  --  14.4*  --  15.9*  --  15.1* 15.4*   < > 19.6*   HGB  --  7.2*  --  7.1*   --  8.0* 7.9*   < > 8.4*   MCV  --  93  --  93  --  92 92   < > 89   PLT  --  144*  --  143*  --  146* 147*   < > 131*   INR  --  1.25*  --  1.15  --  1.11 1.09   < > 1.12   NA  --   --   --  135*  --  133* 139   < > 137   POTASSIUM  --   --   --  3.9  --  3.9 3.9   < > 4.5   CHLORIDE  --   --   --  105  --  103 107   < > 107   CO2  --   --   --  22  --  22 21*   < > 20*   BUN  --   --   --  30.8*  --  29.4* 30.1*   < > 29.1*   CR  --   --   --  0.58*  --  0.67 0.65*   < > 0.67   ANIONGAP  --   --   --  8  --  8 11   < > 10   ELDA  --   --   --  7.8*  --  8.1* 8.3*   < > 8.4*   *  --  141* 120*   < > 129* 127*   < > 169*   ALBUMIN  --   --   --   --   --  2.6*  --   --  2.4*   ALT  --   --   --   --   --  13  --   --  17    < > = values in this interval not displayed.       Medications     dexmedetomidine 1.2 mcg/kg/hr (07/17/22 1408)     dextrose       fentaNYL 25 mcg/hr (07/17/22 1300)     HEParin 1,100 Units/hr (07/17/22 1600)     propofol (DIPRIVAN) infusion 30 mcg/kg/min (07/17/22 1410)       acetaminophen  975 mg Oral or Feeding Tube Q8H [facility]     aspirin  81 mg Oral or Feeding Tube Daily     DULoxetine  30 mg Oral Daily     fluticasone-salmeterol  2 puff Inhalation BID     hydrALAZINE  25 mg Oral or Feeding Tube Q8H [facility]     hydroxychloroquine  200 mg Oral BID     insulin aspart  1-12 Units Subcutaneous Q4H     insulin glargine  35 Units Subcutaneous QAM AC     melatonin  5 mg Oral QPM     multivitamins w/minerals  15 mL Per Feeding Tube Daily     oxyCODONE  10 mg Oral or Feeding Tube Q4H     pantoprazole  40 mg Per Feeding Tube QAM AC     piperacillin-tazobactam  4.5 g Intravenous Q6H     polyethylene glycol  17 g Oral or Feeding Tube Daily     protein modular  1 packet Per Feeding Tube TID     QUEtiapine  100 mg Oral or Feeding Tube At Bedtime     QUEtiapine  50 mg Oral or Feeding Tube BID     senna-docusate  1 tablet Oral or Feeding Tube BID     sodium chloride (PF)  3 mL Intracatheter Q8H     sodium  chloride (PF)  3 mL Intracatheter Q8H       TTE 7/17/2022:  HM3 LVAD at 5200 RPM and Protek Duo RVAD at 4.3L/min.  Normal LV size with severely reduced global LV function, LVEF<20%. LVIDd= 4.6  cm.  Normal RV size and mildly reduced RV function, RVFAC 31%. The ventricular  septum is midline.  The aortic valve opens minimally with each beat. There is no AI.  LVAD inflow cannula is visualized in the LV apex. Normal Doppler interrogation  of the LVAD inflow cannula.  LVAD outflow graft is not well assessed.  Organized material is present in the pericardial space, most prominently  anterior to the RV free wall. There is no evidence of pericardial tamponade.  This was also present on the 7/12/22 study, but is better imaged on today's  study.     This study was compared with the study from 7/12/22: Image quality is  significantly improved. There has otherwise been no significant change,  however the prior study was extremely limited.

## 2022-07-18 ENCOUNTER — APPOINTMENT (OUTPATIENT)
Dept: GENERAL RADIOLOGY | Facility: CLINIC | Age: 62
DRG: 001 | End: 2022-07-18
Attending: STUDENT IN AN ORGANIZED HEALTH CARE EDUCATION/TRAINING PROGRAM
Payer: COMMERCIAL

## 2022-07-18 ENCOUNTER — APPOINTMENT (OUTPATIENT)
Dept: ULTRASOUND IMAGING | Facility: CLINIC | Age: 62
DRG: 001 | End: 2022-07-18
Attending: NURSE PRACTITIONER
Payer: COMMERCIAL

## 2022-07-18 LAB
ABO/RH(D): NORMAL
ALBUMIN SERPL BCG-MCNC: 2.3 G/DL (ref 3.5–5.2)
ALBUMIN SERPL BCG-MCNC: 2.6 G/DL (ref 3.5–5.2)
ALP SERPL-CCNC: 187 U/L (ref 40–129)
ALT SERPL W P-5'-P-CCNC: 12 U/L (ref 10–50)
ALT SERPL W P-5'-P-CCNC: 13 U/L (ref 10–50)
ANION GAP SERPL CALCULATED.3IONS-SCNC: 10 MMOL/L (ref 7–15)
ANION GAP SERPL CALCULATED.3IONS-SCNC: 11 MMOL/L (ref 7–15)
ANTIBODY SCREEN: NEGATIVE
APTT PPP: 102 SECONDS (ref 22–38)
APTT PPP: 114 SECONDS (ref 22–38)
APTT PPP: 158 SECONDS (ref 22–38)
APTT PPP: 98 SECONDS (ref 22–38)
AST SERPL W P-5'-P-CCNC: 23 U/L (ref 10–50)
AT III ACT/NOR PPP CHRO: 97 % (ref 85–135)
BACTERIA BLD CULT: NO GROWTH
BASE EXCESS BLDA CALC-SCNC: 0.8 MMOL/L (ref -9–1.8)
BASE EXCESS BLDA CALC-SCNC: 1.2 MMOL/L (ref -9–1.8)
BASE EXCESS BLDA CALC-SCNC: 2.4 MMOL/L (ref -9–1.8)
BASE EXCESS BLDA CALC-SCNC: 3 MMOL/L (ref -9–1.8)
BASE EXCESS BLDA CALC-SCNC: 3.5 MMOL/L (ref -9–1.8)
BASOPHILS # BLD MANUAL: 0 10E3/UL (ref 0–0.2)
BASOPHILS NFR BLD MANUAL: 0 %
BILIRUB DIRECT SERPL-MCNC: 0.4 MG/DL (ref 0–0.3)
BILIRUB SERPL-MCNC: 0.7 MG/DL
BLD PROD TYP BPU: NORMAL
BLOOD COMPONENT TYPE: NORMAL
BUN SERPL-MCNC: 24.5 MG/DL (ref 8–23)
BUN SERPL-MCNC: 25.7 MG/DL (ref 8–23)
BUN SERPL-MCNC: 25.9 MG/DL (ref 8–23)
BUN SERPL-MCNC: 27.9 MG/DL (ref 8–23)
BURR CELLS BLD QL SMEAR: SLIGHT
C DIFF TOX B STL QL: NEGATIVE
CA-I BLD-MCNC: 4.1 MG/DL (ref 4.4–5.2)
CA-I BLD-MCNC: 4.3 MG/DL (ref 4.4–5.2)
CA-I BLD-MCNC: 4.4 MG/DL (ref 4.4–5.2)
CA-I BLD-MCNC: 4.5 MG/DL (ref 4.4–5.2)
CALCIUM SERPL-MCNC: 7.7 MG/DL (ref 8.8–10.2)
CALCIUM SERPL-MCNC: 7.8 MG/DL (ref 8.8–10.2)
CALCIUM SERPL-MCNC: 7.9 MG/DL (ref 8.8–10.2)
CALCIUM SERPL-MCNC: 8 MG/DL (ref 8.8–10.2)
CHLORIDE SERPL-SCNC: 106 MMOL/L (ref 98–107)
CHLORIDE SERPL-SCNC: 106 MMOL/L (ref 98–107)
CHLORIDE SERPL-SCNC: 107 MMOL/L (ref 98–107)
CHLORIDE SERPL-SCNC: 108 MMOL/L (ref 98–107)
CODING SYSTEM: NORMAL
CREAT SERPL-MCNC: 0.61 MG/DL (ref 0.67–1.17)
CREAT SERPL-MCNC: 0.62 MG/DL (ref 0.67–1.17)
CREAT SERPL-MCNC: 0.64 MG/DL (ref 0.67–1.17)
CREAT SERPL-MCNC: 0.66 MG/DL (ref 0.67–1.17)
CROSSMATCH: NORMAL
D DIMER PPP FEU-MCNC: 5.49 UG/ML FEU (ref 0–0.5)
D DIMER PPP FEU-MCNC: 5.96 UG/ML FEU (ref 0–0.5)
DACRYOCYTES BLD QL SMEAR: SLIGHT
DEPRECATED HCO3 PLAS-SCNC: 23 MMOL/L (ref 22–29)
DEPRECATED HCO3 PLAS-SCNC: 23 MMOL/L (ref 22–29)
DEPRECATED HCO3 PLAS-SCNC: 25 MMOL/L (ref 22–29)
DEPRECATED HCO3 PLAS-SCNC: 25 MMOL/L (ref 22–29)
EOSINOPHIL # BLD MANUAL: 0.2 10E3/UL (ref 0–0.7)
EOSINOPHIL # BLD MANUAL: 0.2 10E3/UL (ref 0–0.7)
EOSINOPHIL # BLD MANUAL: 0.4 10E3/UL (ref 0–0.7)
EOSINOPHIL # BLD MANUAL: 0.5 10E3/UL (ref 0–0.7)
EOSINOPHIL NFR BLD MANUAL: 1 %
EOSINOPHIL NFR BLD MANUAL: 1 %
EOSINOPHIL NFR BLD MANUAL: 2 %
EOSINOPHIL NFR BLD MANUAL: 3 %
ERYTHROCYTE [DISTWIDTH] IN BLOOD BY AUTOMATED COUNT: 18.5 % (ref 10–15)
ERYTHROCYTE [DISTWIDTH] IN BLOOD BY AUTOMATED COUNT: 18.6 % (ref 10–15)
ERYTHROCYTE [DISTWIDTH] IN BLOOD BY AUTOMATED COUNT: 18.6 % (ref 10–15)
ERYTHROCYTE [DISTWIDTH] IN BLOOD BY AUTOMATED COUNT: 18.8 % (ref 10–15)
FIBRINOGEN PPP-MCNC: 499 MG/DL (ref 170–490)
FIBRINOGEN PPP-MCNC: 528 MG/DL (ref 170–490)
GFR SERPL CREATININE-BSD FRML MDRD: >90 ML/MIN/1.73M2
GLUCOSE BLDC GLUCOMTR-MCNC: 121 MG/DL (ref 70–99)
GLUCOSE BLDC GLUCOMTR-MCNC: 125 MG/DL (ref 70–99)
GLUCOSE BLDC GLUCOMTR-MCNC: 133 MG/DL (ref 70–99)
GLUCOSE BLDC GLUCOMTR-MCNC: 139 MG/DL (ref 70–99)
GLUCOSE SERPL-MCNC: 120 MG/DL (ref 70–99)
GLUCOSE SERPL-MCNC: 127 MG/DL (ref 70–99)
GLUCOSE SERPL-MCNC: 127 MG/DL (ref 70–99)
GLUCOSE SERPL-MCNC: 167 MG/DL (ref 70–99)
HCO3 BLD-SCNC: 25 MMOL/L (ref 21–28)
HCO3 BLD-SCNC: 26 MMOL/L (ref 21–28)
HCO3 BLD-SCNC: 27 MMOL/L (ref 21–28)
HCT VFR BLD AUTO: 23.4 % (ref 40–53)
HCT VFR BLD AUTO: 23.7 % (ref 40–53)
HCT VFR BLD AUTO: 24.1 % (ref 40–53)
HCT VFR BLD AUTO: 24.4 % (ref 40–53)
HGB BLD-MCNC: 7.5 G/DL (ref 13.3–17.7)
HGB BLD-MCNC: 7.6 G/DL (ref 13.3–17.7)
HGB BLD-MCNC: 7.7 G/DL (ref 13.3–17.7)
HGB BLD-MCNC: 7.8 G/DL (ref 13.3–17.7)
HGB FREE PLAS-MCNC: 50 MG/DL
INR PPP: 1.22 (ref 0.85–1.15)
INR PPP: 1.23 (ref 0.85–1.15)
INR PPP: 1.31 (ref 0.85–1.15)
INR PPP: 1.33 (ref 0.85–1.15)
ISSUE DATE AND TIME: NORMAL
LACTATE SERPL-SCNC: 1.1 MMOL/L (ref 0.7–2)
LACTATE SERPL-SCNC: 1.3 MMOL/L (ref 0.7–2)
LACTATE SERPL-SCNC: 1.8 MMOL/L (ref 0.7–2)
LACTATE SERPL-SCNC: 2 MMOL/L (ref 0.7–2)
LYMPHOCYTES # BLD MANUAL: 0.4 10E3/UL (ref 0.8–5.3)
LYMPHOCYTES # BLD MANUAL: 0.7 10E3/UL (ref 0.8–5.3)
LYMPHOCYTES # BLD MANUAL: 1.5 10E3/UL (ref 0.8–5.3)
LYMPHOCYTES # BLD MANUAL: 1.5 10E3/UL (ref 0.8–5.3)
LYMPHOCYTES NFR BLD MANUAL: 10 %
LYMPHOCYTES NFR BLD MANUAL: 2 %
LYMPHOCYTES NFR BLD MANUAL: 4 %
LYMPHOCYTES NFR BLD MANUAL: 9 %
MAGNESIUM SERPL-MCNC: 1.8 MG/DL (ref 1.7–2.3)
MAGNESIUM SERPL-MCNC: 2.5 MG/DL (ref 1.7–2.3)
MCH RBC QN AUTO: 29.4 PG (ref 26.5–33)
MCH RBC QN AUTO: 29.6 PG (ref 26.5–33)
MCH RBC QN AUTO: 29.6 PG (ref 26.5–33)
MCH RBC QN AUTO: 29.7 PG (ref 26.5–33)
MCHC RBC AUTO-ENTMCNC: 31.6 G/DL (ref 31.5–36.5)
MCHC RBC AUTO-ENTMCNC: 32 G/DL (ref 31.5–36.5)
MCHC RBC AUTO-ENTMCNC: 32 G/DL (ref 31.5–36.5)
MCHC RBC AUTO-ENTMCNC: 32.5 G/DL (ref 31.5–36.5)
MCV RBC AUTO: 91 FL (ref 78–100)
MCV RBC AUTO: 92 FL (ref 78–100)
MCV RBC AUTO: 93 FL (ref 78–100)
MCV RBC AUTO: 94 FL (ref 78–100)
METAMYELOCYTES # BLD MANUAL: 0.2 10E3/UL
METAMYELOCYTES # BLD MANUAL: 0.2 10E3/UL
METAMYELOCYTES # BLD MANUAL: 0.5 10E3/UL
METAMYELOCYTES # BLD MANUAL: 0.7 10E3/UL
METAMYELOCYTES NFR BLD MANUAL: 1 %
METAMYELOCYTES NFR BLD MANUAL: 1 %
METAMYELOCYTES NFR BLD MANUAL: 3 %
METAMYELOCYTES NFR BLD MANUAL: 4 %
MONOCYTES # BLD MANUAL: 0.4 10E3/UL (ref 0–1.3)
MONOCYTES # BLD MANUAL: 0.5 10E3/UL (ref 0–1.3)
MONOCYTES # BLD MANUAL: 0.5 10E3/UL (ref 0–1.3)
MONOCYTES # BLD MANUAL: 1.4 10E3/UL (ref 0–1.3)
MONOCYTES NFR BLD MANUAL: 2 %
MONOCYTES NFR BLD MANUAL: 3 %
MONOCYTES NFR BLD MANUAL: 3 %
MONOCYTES NFR BLD MANUAL: 9 %
MYELOCYTES # BLD MANUAL: 0.2 10E3/UL
MYELOCYTES # BLD MANUAL: 0.3 10E3/UL
MYELOCYTES NFR BLD MANUAL: 1 %
MYELOCYTES NFR BLD MANUAL: 2 %
NEUTROPHILS # BLD MANUAL: 11.3 10E3/UL (ref 1.6–8.3)
NEUTROPHILS # BLD MANUAL: 13.2 10E3/UL (ref 1.6–8.3)
NEUTROPHILS # BLD MANUAL: 14.7 10E3/UL (ref 1.6–8.3)
NEUTROPHILS # BLD MANUAL: 17.5 10E3/UL (ref 1.6–8.3)
NEUTROPHILS NFR BLD MANUAL: 75 %
NEUTROPHILS NFR BLD MANUAL: 81 %
NEUTROPHILS NFR BLD MANUAL: 88 %
NEUTROPHILS NFR BLD MANUAL: 92 %
NRBC # BLD AUTO: 0.2 10E3/UL
NRBC # BLD AUTO: 0.3 10E3/UL
NRBC BLD MANUAL-RTO: 1 %
NRBC BLD MANUAL-RTO: 2 %
O2/TOTAL GAS SETTING VFR VENT: 35 %
OXYHGB MFR BLD: 96 % (ref 92–100)
OXYHGB MFR BLD: 97 % (ref 92–100)
OXYHGB MFR BLD: 98 % (ref 92–100)
PCO2 BLD: 34 MM HG (ref 35–45)
PCO2 BLD: 34 MM HG (ref 35–45)
PCO2 BLD: 35 MM HG (ref 35–45)
PCO2 BLD: 37 MM HG (ref 35–45)
PCO2 BLD: 39 MM HG (ref 35–45)
PH BLD: 7.43 [PH] (ref 7.35–7.45)
PH BLD: 7.44 [PH] (ref 7.35–7.45)
PH BLD: 7.48 [PH] (ref 7.35–7.45)
PH BLD: 7.49 [PH] (ref 7.35–7.45)
PH BLD: 7.51 [PH] (ref 7.35–7.45)
PHOSPHATE SERPL-MCNC: 3.6 MG/DL (ref 2.5–4.5)
PHOSPHATE SERPL-MCNC: 4.1 MG/DL (ref 2.5–4.5)
PLAT MORPH BLD: ABNORMAL
PLATELET # BLD AUTO: 159 10E3/UL (ref 150–450)
PLATELET # BLD AUTO: 171 10E3/UL (ref 150–450)
PLATELET # BLD AUTO: 177 10E3/UL (ref 150–450)
PLATELET # BLD AUTO: 179 10E3/UL (ref 150–450)
PO2 BLD: 100 MM HG (ref 80–105)
PO2 BLD: 112 MM HG (ref 80–105)
PO2 BLD: 121 MM HG (ref 80–105)
PO2 BLD: 129 MM HG (ref 80–105)
PO2 BLD: 131 MM HG (ref 80–105)
POLYCHROMASIA BLD QL SMEAR: SLIGHT
POTASSIUM SERPL-SCNC: 3.7 MMOL/L (ref 3.4–5.3)
POTASSIUM SERPL-SCNC: 3.8 MMOL/L (ref 3.4–5.3)
POTASSIUM SERPL-SCNC: 3.9 MMOL/L (ref 3.4–5.3)
POTASSIUM SERPL-SCNC: 4.1 MMOL/L (ref 3.4–5.3)
PROMYELOCYTES # BLD MANUAL: 0.2 10E3/UL
PROMYELOCYTES NFR BLD MANUAL: 1 %
PROT SERPL-MCNC: 5.2 G/DL (ref 6.4–8.3)
RBC # BLD AUTO: 2.53 10E6/UL (ref 4.4–5.9)
RBC # BLD AUTO: 2.57 10E6/UL (ref 4.4–5.9)
RBC # BLD AUTO: 2.62 10E6/UL (ref 4.4–5.9)
RBC # BLD AUTO: 2.63 10E6/UL (ref 4.4–5.9)
RBC MORPH BLD: ABNORMAL
SODIUM SERPL-SCNC: 140 MMOL/L (ref 136–145)
SODIUM SERPL-SCNC: 141 MMOL/L (ref 136–145)
SODIUM SERPL-SCNC: 141 MMOL/L (ref 136–145)
SODIUM SERPL-SCNC: 142 MMOL/L (ref 136–145)
SPECIMEN EXPIRATION DATE: NORMAL
UFH PPP CHRO-ACNC: 0.23 IU/ML
UFH PPP CHRO-ACNC: 0.25 IU/ML
UFH PPP CHRO-ACNC: 0.25 IU/ML
UFH PPP CHRO-ACNC: 0.26 IU/ML
UFH PPP CHRO-ACNC: 0.34 IU/ML
UNIT ABO/RH: NORMAL
UNIT NUMBER: NORMAL
UNIT STATUS: NORMAL
UNIT TYPE ISBT: 5100
WBC # BLD AUTO: 15.1 10E3/UL (ref 4–11)
WBC # BLD AUTO: 16.3 10E3/UL (ref 4–11)
WBC # BLD AUTO: 16.7 10E3/UL (ref 4–11)
WBC # BLD AUTO: 19 10E3/UL (ref 4–11)

## 2022-07-18 PROCEDURE — 82805 BLOOD GASES W/O2 SATURATION: CPT | Performed by: SURGERY

## 2022-07-18 PROCEDURE — 250N000011 HC RX IP 250 OP 636: Performed by: STUDENT IN AN ORGANIZED HEALTH CARE EDUCATION/TRAINING PROGRAM

## 2022-07-18 PROCEDURE — 93010 ELECTROCARDIOGRAM REPORT: CPT | Performed by: INTERNAL MEDICINE

## 2022-07-18 PROCEDURE — 85379 FIBRIN DEGRADATION QUANT: CPT | Performed by: SURGERY

## 2022-07-18 PROCEDURE — 94640 AIRWAY INHALATION TREATMENT: CPT

## 2022-07-18 PROCEDURE — 83605 ASSAY OF LACTIC ACID: CPT | Performed by: SURGERY

## 2022-07-18 PROCEDURE — 85027 COMPLETE CBC AUTOMATED: CPT | Performed by: SURGERY

## 2022-07-18 PROCEDURE — 85520 HEPARIN ASSAY: CPT | Performed by: ANESTHESIOLOGY

## 2022-07-18 PROCEDURE — P9016 RBC LEUKOCYTES REDUCED: HCPCS | Performed by: SURGERY

## 2022-07-18 PROCEDURE — 80069 RENAL FUNCTION PANEL: CPT | Performed by: SURGERY

## 2022-07-18 PROCEDURE — 85730 THROMBOPLASTIN TIME PARTIAL: CPT | Performed by: SURGERY

## 2022-07-18 PROCEDURE — 250N000011 HC RX IP 250 OP 636: Performed by: SURGERY

## 2022-07-18 PROCEDURE — 999N000157 HC STATISTIC RCP TIME EA 10 MIN

## 2022-07-18 PROCEDURE — 250N000013 HC RX MED GY IP 250 OP 250 PS 637: Performed by: INTERNAL MEDICINE

## 2022-07-18 PROCEDURE — 85384 FIBRINOGEN ACTIVITY: CPT | Performed by: SURGERY

## 2022-07-18 PROCEDURE — 80053 COMPREHEN METABOLIC PANEL: CPT | Performed by: SURGERY

## 2022-07-18 PROCEDURE — P9041 ALBUMIN (HUMAN),5%, 50ML: HCPCS

## 2022-07-18 PROCEDURE — 85520 HEPARIN ASSAY: CPT | Performed by: SURGERY

## 2022-07-18 PROCEDURE — 250N000011 HC RX IP 250 OP 636

## 2022-07-18 PROCEDURE — 71045 X-RAY EXAM CHEST 1 VIEW: CPT

## 2022-07-18 PROCEDURE — 71045 X-RAY EXAM CHEST 1 VIEW: CPT | Mod: 26 | Performed by: RADIOLOGY

## 2022-07-18 PROCEDURE — 36620 INSERTION CATHETER ARTERY: CPT

## 2022-07-18 PROCEDURE — 83735 ASSAY OF MAGNESIUM: CPT | Performed by: THORACIC SURGERY (CARDIOTHORACIC VASCULAR SURGERY)

## 2022-07-18 PROCEDURE — 250N000013 HC RX MED GY IP 250 OP 250 PS 637: Performed by: ANESTHESIOLOGY

## 2022-07-18 PROCEDURE — 84100 ASSAY OF PHOSPHORUS: CPT | Performed by: SURGERY

## 2022-07-18 PROCEDURE — 82330 ASSAY OF CALCIUM: CPT | Performed by: SURGERY

## 2022-07-18 PROCEDURE — 250N000011 HC RX IP 250 OP 636: Performed by: NURSE PRACTITIONER

## 2022-07-18 PROCEDURE — 82310 ASSAY OF CALCIUM: CPT | Performed by: SURGERY

## 2022-07-18 PROCEDURE — 83051 HEMOGLOBIN PLASMA: CPT | Performed by: SURGERY

## 2022-07-18 PROCEDURE — 85610 PROTHROMBIN TIME: CPT | Performed by: SURGERY

## 2022-07-18 PROCEDURE — 200N000002 HC R&B ICU UMMC

## 2022-07-18 PROCEDURE — 94640 AIRWAY INHALATION TREATMENT: CPT | Mod: 76

## 2022-07-18 PROCEDURE — 250N000013 HC RX MED GY IP 250 OP 250 PS 637: Performed by: STUDENT IN AN ORGANIZED HEALTH CARE EDUCATION/TRAINING PROGRAM

## 2022-07-18 PROCEDURE — 93971 EXTREMITY STUDY: CPT | Mod: LT

## 2022-07-18 PROCEDURE — 250N000011 HC RX IP 250 OP 636: Performed by: THORACIC SURGERY (CARDIOTHORACIC VASCULAR SURGERY)

## 2022-07-18 PROCEDURE — 86850 RBC ANTIBODY SCREEN: CPT | Performed by: THORACIC SURGERY (CARDIOTHORACIC VASCULAR SURGERY)

## 2022-07-18 PROCEDURE — 83735 ASSAY OF MAGNESIUM: CPT | Performed by: SURGERY

## 2022-07-18 PROCEDURE — 87493 C DIFF AMPLIFIED PROBE: CPT | Performed by: NURSE PRACTITIONER

## 2022-07-18 PROCEDURE — 94003 VENT MGMT INPAT SUBQ DAY: CPT

## 2022-07-18 PROCEDURE — 99291 CRITICAL CARE FIRST HOUR: CPT | Mod: 25 | Performed by: INTERNAL MEDICINE

## 2022-07-18 PROCEDURE — 93750 INTERROGATION VAD IN PERSON: CPT | Performed by: INTERNAL MEDICINE

## 2022-07-18 PROCEDURE — 36415 COLL VENOUS BLD VENIPUNCTURE: CPT | Performed by: SURGERY

## 2022-07-18 PROCEDURE — 85300 ANTITHROMBIN III ACTIVITY: CPT | Performed by: SURGERY

## 2022-07-18 PROCEDURE — 99223 1ST HOSP IP/OBS HIGH 75: CPT | Mod: GC | Performed by: INTERNAL MEDICINE

## 2022-07-18 PROCEDURE — 87040 BLOOD CULTURE FOR BACTERIA: CPT | Performed by: SURGERY

## 2022-07-18 PROCEDURE — 250N000013 HC RX MED GY IP 250 OP 250 PS 637: Performed by: SURGERY

## 2022-07-18 PROCEDURE — 999N000015 HC STATISTIC ARTERIAL MONITORING DAILY

## 2022-07-18 PROCEDURE — 93005 ELECTROCARDIOGRAM TRACING: CPT

## 2022-07-18 PROCEDURE — 84100 ASSAY OF PHOSPHORUS: CPT | Performed by: THORACIC SURGERY (CARDIOTHORACIC VASCULAR SURGERY)

## 2022-07-18 PROCEDURE — 250N000009 HC RX 250: Performed by: STUDENT IN AN ORGANIZED HEALTH CARE EDUCATION/TRAINING PROGRAM

## 2022-07-18 PROCEDURE — 93971 EXTREMITY STUDY: CPT | Mod: 26 | Performed by: STUDENT IN AN ORGANIZED HEALTH CARE EDUCATION/TRAINING PROGRAM

## 2022-07-18 PROCEDURE — 86901 BLOOD TYPING SEROLOGIC RH(D): CPT | Performed by: THORACIC SURGERY (CARDIOTHORACIC VASCULAR SURGERY)

## 2022-07-18 PROCEDURE — 84460 ALANINE AMINO (ALT) (SGPT): CPT | Performed by: SURGERY

## 2022-07-18 PROCEDURE — 82248 BILIRUBIN DIRECT: CPT | Performed by: ANESTHESIOLOGY

## 2022-07-18 PROCEDURE — 85007 BL SMEAR W/DIFF WBC COUNT: CPT | Performed by: SURGERY

## 2022-07-18 PROCEDURE — 86923 COMPATIBILITY TEST ELECTRIC: CPT | Performed by: SURGERY

## 2022-07-18 RX ORDER — ALBUMIN, HUMAN INJ 5% 5 %
SOLUTION INTRAVENOUS
Status: COMPLETED
Start: 2022-07-18 | End: 2022-07-18

## 2022-07-18 RX ORDER — HEPARIN SODIUM 10000 [USP'U]/100ML
0-5000 INJECTION, SOLUTION INTRAVENOUS CONTINUOUS
Status: DISCONTINUED | OUTPATIENT
Start: 2022-07-18 | End: 2022-07-21

## 2022-07-18 RX ORDER — FUROSEMIDE 10 MG/ML
60 INJECTION INTRAMUSCULAR; INTRAVENOUS ONCE
Status: COMPLETED | OUTPATIENT
Start: 2022-07-18 | End: 2022-07-18

## 2022-07-18 RX ORDER — MAGNESIUM SULFATE HEPTAHYDRATE 40 MG/ML
2 INJECTION, SOLUTION INTRAVENOUS ONCE
Status: COMPLETED | OUTPATIENT
Start: 2022-07-18 | End: 2022-07-19

## 2022-07-18 RX ORDER — GUAR GUM
1 PACKET (EA) ORAL 2 TIMES DAILY
Status: DISCONTINUED | OUTPATIENT
Start: 2022-07-18 | End: 2022-07-20

## 2022-07-18 RX ORDER — POTASSIUM CHLORIDE 29.8 MG/ML
20 INJECTION INTRAVENOUS ONCE
Status: COMPLETED | OUTPATIENT
Start: 2022-07-18 | End: 2022-07-18

## 2022-07-18 RX ORDER — CALCIUM GLUCONATE 20 MG/ML
1 INJECTION, SOLUTION INTRAVENOUS
Status: DISPENSED | OUTPATIENT
Start: 2022-07-18 | End: 2022-07-21

## 2022-07-18 RX ADMIN — Medication 1 PACKET: at 15:10

## 2022-07-18 RX ADMIN — POTASSIUM CHLORIDE 20 MEQ: 29.8 INJECTION, SOLUTION INTRAVENOUS at 17:21

## 2022-07-18 RX ADMIN — PROPOFOL 50 MCG/KG/MIN: 10 INJECTION, EMULSION INTRAVENOUS at 06:30

## 2022-07-18 RX ADMIN — QUETIAPINE FUMARATE 50 MG: 50 TABLET ORAL at 15:07

## 2022-07-18 RX ADMIN — FLUTICASONE PROPIONATE AND SALMETEROL XINAFOATE 2 PUFF: 115; 21 AEROSOL, METERED RESPIRATORY (INHALATION) at 19:41

## 2022-07-18 RX ADMIN — DEXMEDETOMIDINE HYDROCHLORIDE 0.5 MCG/KG/HR: 4 INJECTION, SOLUTION INTRAVENOUS at 10:29

## 2022-07-18 RX ADMIN — OXYCODONE HYDROCHLORIDE 10 MG: 10 TABLET ORAL at 15:07

## 2022-07-18 RX ADMIN — MAGNESIUM SULFATE IN WATER 2 G: 40 INJECTION, SOLUTION INTRAVENOUS at 23:44

## 2022-07-18 RX ADMIN — OXYCODONE HYDROCHLORIDE 10 MG: 10 TABLET ORAL at 18:37

## 2022-07-18 RX ADMIN — QUETIAPINE FUMARATE 100 MG: 100 TABLET ORAL at 21:11

## 2022-07-18 RX ADMIN — Medication 10 MG: at 20:19

## 2022-07-18 RX ADMIN — INSULIN GLARGINE 35 UNITS: 100 INJECTION, SOLUTION SUBCUTANEOUS at 09:14

## 2022-07-18 RX ADMIN — Medication 1 PACKET: at 09:35

## 2022-07-18 RX ADMIN — ACETAMINOPHEN 975 MG: 325 TABLET, FILM COATED ORAL at 15:15

## 2022-07-18 RX ADMIN — ASPIRIN 81 MG CHEWABLE TABLET 81 MG: 81 TABLET CHEWABLE at 09:04

## 2022-07-18 RX ADMIN — QUETIAPINE FUMARATE 50 MG: 50 TABLET ORAL at 09:04

## 2022-07-18 RX ADMIN — PIPERACILLIN AND TAZOBACTAM 4.5 G: 4; .5 INJECTION, POWDER, LYOPHILIZED, FOR SOLUTION INTRAVENOUS at 23:04

## 2022-07-18 RX ADMIN — DEXMEDETOMIDINE HYDROCHLORIDE 1.2 MCG/KG/HR: 4 INJECTION, SOLUTION INTRAVENOUS at 20:42

## 2022-07-18 RX ADMIN — CALCIUM GLUCONATE 1 G: 20 INJECTION, SOLUTION INTRAVENOUS at 16:36

## 2022-07-18 RX ADMIN — OXYCODONE HYDROCHLORIDE 10 MG: 10 TABLET ORAL at 21:11

## 2022-07-18 RX ADMIN — OXYCODONE HYDROCHLORIDE 10 MG: 10 TABLET ORAL at 09:28

## 2022-07-18 RX ADMIN — HYDROXYCHLOROQUINE SULFATE 200 MG: 200 TABLET, FILM COATED ORAL at 09:04

## 2022-07-18 RX ADMIN — Medication 40 MG: at 09:29

## 2022-07-18 RX ADMIN — PIPERACILLIN AND TAZOBACTAM 4.5 G: 4; .5 INJECTION, POWDER, LYOPHILIZED, FOR SOLUTION INTRAVENOUS at 11:38

## 2022-07-18 RX ADMIN — ACETAMINOPHEN 975 MG: 325 TABLET, FILM COATED ORAL at 21:11

## 2022-07-18 RX ADMIN — PIPERACILLIN AND TAZOBACTAM 4.5 G: 4; .5 INJECTION, POWDER, LYOPHILIZED, FOR SOLUTION INTRAVENOUS at 17:41

## 2022-07-18 RX ADMIN — FUROSEMIDE 60 MG: 10 INJECTION, SOLUTION INTRAVENOUS at 10:35

## 2022-07-18 RX ADMIN — HYDROXYCHLOROQUINE SULFATE 200 MG: 200 TABLET, FILM COATED ORAL at 20:19

## 2022-07-18 RX ADMIN — FUROSEMIDE 60 MG: 10 INJECTION, SOLUTION INTRAMUSCULAR; INTRAVENOUS at 18:59

## 2022-07-18 RX ADMIN — PROPOFOL 20 MCG/KG/MIN: 10 INJECTION, EMULSION INTRAVENOUS at 20:42

## 2022-07-18 RX ADMIN — FLUTICASONE PROPIONATE AND SALMETEROL XINAFOATE 2 PUFF: 115; 21 AEROSOL, METERED RESPIRATORY (INHALATION) at 08:14

## 2022-07-18 RX ADMIN — ALBUMIN HUMAN 12.5 G: 0.05 INJECTION, SOLUTION INTRAVENOUS at 21:42

## 2022-07-18 RX ADMIN — HYDRALAZINE HYDROCHLORIDE 25 MG: 25 TABLET, FILM COATED ORAL at 15:07

## 2022-07-18 RX ADMIN — MULTIVITAMIN 15 ML: LIQUID ORAL at 09:28

## 2022-07-18 RX ADMIN — DULOXETINE 30 MG: 30 CAPSULE, DELAYED RELEASE ORAL at 09:04

## 2022-07-18 RX ADMIN — PROPOFOL 50 MCG/KG/MIN: 10 INJECTION, EMULSION INTRAVENOUS at 06:22

## 2022-07-18 RX ADMIN — HYDRALAZINE HYDROCHLORIDE 25 MG: 25 TABLET, FILM COATED ORAL at 21:11

## 2022-07-18 RX ADMIN — Medication 1 PACKET: at 20:19

## 2022-07-18 RX ADMIN — PIPERACILLIN AND TAZOBACTAM 4.5 G: 4; .5 INJECTION, POWDER, LYOPHILIZED, FOR SOLUTION INTRAVENOUS at 06:30

## 2022-07-18 ASSESSMENT — ACTIVITIES OF DAILY LIVING (ADL)
ADLS_ACUITY_SCORE: 38

## 2022-07-18 NOTE — PROGRESS NOTES
VAD Shift Summary: (1845 to 0715)    VAD Equipment:  Primary console serial number: [id number]  Backup console serial number: [id number]  Circuit lot number: [id number]  Pump head lot number: [id number]    Patient remains on RVAD support, all equipment is functioning and alarms are appropriately set. RPMs 3100 with flow range 2.4 - 2.5 L/min. Circuit remains free of visible air. Fibrin/clot present at sensor sites, circuit connectors, and cannula connections. Occasional alarms from [employer] monitor sensors for microclot detection. Cannulas are secure with no bleeding from site. Extremities are dry, cool.     Significant Shift Events:   No major events this shift. Pt moved from recliner back to bed without incident. Hgb in the 7.1 at 0400 draw; 1 PRBC ordered to meet Hgb goals of >8. No issues with RVAD flows/chugging.    Bleeding/Anticoagulation:  Heparin is running at 1,100 u/hr, following HepXa, 0.23 at both checks this shift.    Volume Status:  Urine output is 50 - 100 mL/hr. CT output minimal.      Intake/Output Summary (Last 24 hours) at 7/18/2022 0540  Last data filed at 7/18/2022 0400  Gross per 24 hour   Intake 2638.08 ml   Output 2990 ml   Net -351.92 ml       Labs:    Recent Labs   Lab 07/18/22  0423 07/17/22  2155 07/17/22  1555 07/17/22  1158   PH 7.43 7.44 7.43 7.39   PCO2 39 38 37 41   PO2 100 110* 119* 130*   HCO3 26 26 25 24   O2PER 35 35 35 35       Lab Results   Component Value Date    HGB 7.5 (L) 07/18/2022    PHGB 50 (H) 07/18/2022     07/18/2022    FIBR 528 (H) 07/18/2022    INR 1.23 (H) 07/18/2022    PTT 98 (H) 07/18/2022    DD 5.49 (H) 07/18/2022    ANTCH 93 07/17/2022       Vent settings:  Vent Mode: CMV/AC  (Continuous Mandatory Ventilation/ Assist Control)  FiO2 (%): 35 %  Resp Rate (Set): 12 breaths/min  Tidal Volume (Set, mL): 450 mL  PEEP (cm H2O): 5 cmH2O  Pressure Support (cm H2O): 7 cmH2O  Resp: 15  .        Plan is to continue RVAD support, wean flows as indicated.      Lisa  Kim RRT-ACCS / ECMO Specialist  7/18/2022 5:40 AM

## 2022-07-18 NOTE — CONSULTS
Rheumatology Consult Note-Fellow    Dandy Sands MRN# [id number]   Age: 61 year old YOB: 1960     Date of Admission: 6/17/2022    Reason for consult: History of lupus on Plaquenil and CellCept.  Consult for lupus management while inpatient.    Requesting Physician: Dr. Zamora      Assessment & Plan:     ASSESSMENT:  61 year old male with history of CAD s/p remote PCI to LAD, ICM LVEF 30% s/p CRT-D, severe MR, SLE and APL with hx of DVT/PE on chronic anticoagulation with warfarin, hx of COVID-19 1/2022 (was hospitalized, not intubated), HTN, and HLD who was admitted to [facility] 5/16/2022 as a transfer from [facility] in [place] for evaluation of multivessel coronary artery disease and moderate-severe functional mitral regurgitation. Rheumatology consulted for evaluation of his current immunosuppressive regimen in setting of anticipated CABG.      #. Previously diagnosed APL w/ prior PE and DVT on chronic AC   #. Previously diagnosed SLE (LASHANDA+, dsDNA+, anti-Smith+, anti-RNP ab+, anti-histone ab+)  -He has been on plaquenil for many years. He was previously on azathioprine until summer of 2020 when he had some increased arthritis, anemia and mild proteinuria. He was switched to cellcept at that time. He has also been on chronic anticoagulation with warfarin.   -Currently patient is on Plaquenil. CellCept and currently on hold.  -With an active ongoing infection, will continue to hold CellCept.  -Patient's lupus might be well controlled on Plaquenil.  Will add dsDNA, complement levels for further evaluation.      Everett Solis,  Rheumatology Fellow,  Pager: [phone].      I communicated the assessment and plan to the consulting team.    Case seen and discussed with Dr. Maldonado who agrees with above assessment and plan.     Thank you Darius Zamora, for for this interesting consult. Please contact us if there are any questions.     Everett Solis,  Rheumatology  "Fellow.    \"This note was generated using dragon software and reviewed by myself.  Please excuse any grammatical or spelling errors above\".       Attending Note: I saw and evaluated the patient with Dr. Solis. I agree with the assessment and plan.    Reshma Maldonado MD  HPI     Dandy Sands is a 61 year old with a hx of 61 year old male with PMH of CAD s/p PCI to LAD, MI, ICM, acute on chronic heart failure, s/p CRT-D, severe MR, antiphospholipid antibody syndrome on chronic warfarin, SLE, HTN, HLD, recent hospitalization 5/16-5/26 s/p RCA, LAD stenting who underwent RVAD (29F Protek duo RIJ) LVAD placement (HeartMate 3) on 7/8/22 by Dr. Zamora.  Rheumatology consulted for evaluation of his current immunosuppressive regimen in setting of anticipated CABG.      Rheumatology history:   Patient reports being diagnosed with lupus about 15-20 years ago when he presented with numerous blood clots, rash and swelling all over his body. He reports a history of extreme sun-sensitivity as well. He gets occasional episodes of posterior scleritis. His main symptom currently is joint pain but it is relatively well controlled. He has no history of nephritis.      He has been on plaquenil for many years. He was previously on azathioprine until summer of 2020 when he had some increased arthritis, anemia and mild proteinuria. He was switched to cellcept at that time. He has also been on chronic anticoagulation with warfarin.      Primary rheumatologist is Dr. Stanley Garcia in [place], [place]. Dr. Garcia was contacted for collateral information on his rheumatology history this admission.      Lupus labs 7/2011 available in [facility]:   - dsDNA 1:160  - LASHANDA 1:320 nucleolar   - C3/C3 wnl      Otherwise other serologies that have been positive included: anti-Smith ab, anti-RNP ab, anti-histone ab. SSA/SSB have been normal in the past.     Hospitalization history:  Intraoperative course complicated by IABP dysfunction " and RV failure requiring crash onto BiPAP/vasopressor support, and  Product placement.  No s/p chest closure on 7/11.  Hemopericardium and chest reclosure on 7/13.  Critically ill with heart failure.  Febrile, tachycardia and hypotensive since yesterday and is currently being treated for E.faecalis pneumonia with Zosyn.  Currently being treated for C. difficile.    Serology  Positive: - dsDNA 1:160  - LASHANDA 1:320 nucleolar   - C3/C3 wnl      PAST MEDICAL HISTORY:   - CAD s/p PCI to LAD  - ICM  - S/p CRT-D  - Severe MR  - End stage heart failure, cardiogenic shock  - Antiphospholipid antibody syndrome on chronic warfarin  - HTN  - HLD  - Recent hospitalization 5/16-5/26 s/p RCA, LAD stenting    HISTORY REVIEW:    Past Surgical History:   Procedure Laterality Date     COLONOSCOPY N/A 05/23/2022    Procedure: COLONOSCOPY;  Surgeon: Parth Meneses MD;  Location: [facility] OR     CV CORONARY ANGIOGRAM N/A 5/24/2022    Procedure: Coronary Angiogram;  Surgeon: Mike Pope MD;  Location:  HEART CARDIAC CATH LAB     CV CORONARY ANGIOGRAM N/A 5/29/2022    Procedure: Coronary Angiogram;  Surgeon: Austin Bright MD;  Location:  HEART CARDIAC CATH LAB     CV CORONARY LITHOTRIPSY PCI Bilateral 5/24/2022    Procedure: Percutaneous Coronary Intervention - Lithotripsy;  Surgeon: Mike Pope MD;  Location:  HEART CARDIAC CATH LAB     CV INTRA AORTIC BALLOON N/A 6/17/2022    Procedure: Intraprocedure Aortic Balloon Pump Insertion;  Surgeon: Sherman Cerda MD;  Location:  HEART CARDIAC CATH LAB     CV INTRA AORTIC BALLOON Right 7/3/2022    Procedure: Reposition of Subclavian Intraprocedure Aortic Balloon Pump;  Surgeon: Austin Bright MD;  Location: [facility] CARDIAC CATH LAB     CV INTRAVASULAR ULTRASOUND N/A 5/24/2022    Procedure: Intravascular Ultrasound;  Surgeon: Mike Pope MD;  Location: [facility] CARDIAC CATH LAB     CV PCI ANGIOPLASTY N/A 5/29/2022    Procedure: Percutaneous  Transluminal Angioplasty;  Surgeon: Austin Bright MD;  Location:  HEART CARDIAC CATH LAB     CV PCI STENT DRUG ELUTING N/A 5/24/2022    Procedure: Percutaneous Coronary Intervention Stent;  Surgeon: Mike Pope MD;  Location:  HEART CARDIAC CATH LAB     CV RIGHT HEART CATH MEASUREMENTS RECORDED N/A 05/18/2022    Procedure: Right Heart Catheterization;  Surgeon: Justo Stewart MD;  Location:  HEART CARDIAC CATH LAB     CV RIGHT HEART CATH MEASUREMENTS RECORDED N/A 5/24/2022    Procedure: Right Heart Catheterization;  Surgeon: Mike Pope MD;  Location:  HEART CARDIAC CATH LAB     CV RIGHT HEART CATH MEASUREMENTS RECORDED N/A 5/29/2022    Procedure: Right Heart Catheterization;  Surgeon: Austin Bright MD;  Location:  HEART CARDIAC CATH LAB     CV RIGHT HEART CATH MEASUREMENTS RECORDED N/A 7/1/2022    Procedure: Right Heart Catheterization;  Surgeon: Mike Pope MD;  Location:  HEART CARDIAC CATH LAB     CV RIGHT HEART EXERCISE STRESS STUDY N/A 05/18/2022    Procedure: Stress Drug Study;  Surgeon: Justo Stewart MD;  Location:  HEART CARDIAC CATH LAB     CV SWAN CHOCO PROCEDURE N/A 6/17/2022    Procedure: [place] Choco Procedure;  Surgeon: Sherman Cerda MD;  Location:  HEART CARDIAC CATH LAB     INCISION AND DRAINAGE CHEST WASHOUT, COMBINED N/A 7/11/2022    Procedure: Chest washout and closure;  Surgeon: Darnell Morgan MD;  Location: [facility] OR     INCISION AND DRAINAGE CHEST WASHOUT, COMBINED N/A 7/12/2022    Procedure: INCISION AND DRAINAGE, WOUND, CHEST, WITH IRRIGATION;  Surgeon: Darius Zamora MD;  Location: [facility] OR     INSERT ARTERIAL LINE  7/18/2022          INSERT INTRAAORTIC BALLOON PUMP Right 6/23/2022    Procedure: INSERTION, INTRA-AORTIC BALLOON PUMP RIGHT SUBCLAVIAN ARTERY.;  Surgeon: Hayden Veras MD;  Location: [facility] OR     INSERT VENTRICULAR ASSIST DEVICE LEFT (HEARTMATE II/III) MINIMALLY INVASIVE N/A 7/8/2022    Procedure: MEDIAN STERNOTOMY.   "CARDIOPULMONARY BYPASS.  INTRAOPERATIVE TRANSESOPHAGEAL ECHOCARDIOGRAM.  IMPLANT LEFT VENTRICULAR ASSIST DEVICE HEARTMATE III.  PLACEMENT OF PERCUTANEOUS RIGHT VENTRICULAR ASSIST DEVICE.  TEMPORARY CHEST CLOSURE;  Surgeon: Darius Zamora MD;  Location: [facility] OR     IRRIGATION AND DEBRIDEMENT CHEST WASHOUT, COMBINED N/A 7/13/2022    Procedure: IRRIGATION AND DEBRIDEMENT, CHEST;  Surgeon: Darius Zamora MD;  Location: [facility] OR     PICC DOUBLE LUMEN PLACEMENT Right 05/28/2022    right basilic 5 fr dl picc 40 cm     FHx: Father and sister with lung cancer    SHx: 25-40-pack-year smoking history, quit 15 years ago.  Denied vaping.  No excessive alcohol consumption.  No drugs.  Denied occupational exposure.    Patient Active Problem List   Diagnosis     Decompensated heart failure (H)     Cardiogenic shock (H)     LVAD (left ventricular assist device) present (H)     No Known Allergies      ROS     Unable to obtain, patient is sedated.    Objective     PHYSICAL EXAM  BP (!) 78/63   Pulse (!) 125   Temp 100  F (37.8  C)   Resp 23   Ht 1.702 m (5' 7\")   Wt 82.5 kg (181 lb 14.1 oz)   SpO2 98%   BMI 28.49 kg/m    Wt Readings from Last 4 Encounters:   07/18/22 82.5 kg (181 lb 14.1 oz)   07/08/22 62.6 kg (138 lb)   06/30/22 65.8 kg (145 lb)   05/29/22 65 kg (143 lb 4.8 oz)     Constitutional: Intubated and sedated  Neck: no mass or thyroid enlargement  Resp: lungs clear to auscultation  CV: RRR, no murmurs, rubs or gallops, no edema  MS: no active synovitis   Skin: no rash  Neuro: Intubated and full neurological examination could not be done extensive  Psych: Sedated.    DATA:  Outside Records: YES  Outside Xrays: YES  CBC:  Recent Labs   Lab Test 07/18/22  1526 07/18/22  0954 07/18/22  0422   WBC 16.7* 15.1* 16.3*   RBC 2.62* 2.63* 2.53*   HGB 7.7* 7.8* 7.5*   HCT 24.1* 24.4* 23.7*   MCV 92 93 94   MCH 29.4 29.7 29.6   MCHC 32.0 32.0 31.6   RDW 18.5* 18.6* 18.6*    159 177       BMP:  Recent Labs "   Lab Test 07/18/22  1526 07/18/22  1519 07/18/22  1147 07/18/22  0954 07/18/22  0848 07/18/22  0422     --   --  140  --  142   POTASSIUM 3.7  --   --  3.8  --  4.1   CHLORIDE 106  --   --  107  --  108*   CO2 25  --   --  23  --  23   ANIONGAP 10  --   --  10  --  11   * 125* 139* 167*   < > 127*   BUN 25.7*  --   --  25.9*  --  27.9*   CR 0.64*  --   --  0.61*  --  0.62*   GFRESTIMATED >90  --   --  >90  --  >90   ELDA 7.7*  --   --  7.8*  --  8.0*    < > = values in this interval not displayed.       LFT:  Recent Labs   Lab Test 07/18/22  1526 07/18/22  0422 07/17/22  0408 07/09/22  0408 07/08/22  2213 07/08/22  1643   PROTTOTAL 5.2*  --   --   --  4.4* 3.8*   ALBUMIN 2.3* 2.6* 2.6*   < > 2.5* 2.3*   BILITOTAL 0.7  --   --   --  1.0 1.0   ALKPHOS 187*  --   --   --  59 51   AST 23  --   --   --  159* 134*   ALT 12 13 13   < > 29 25    < > = values in this interval not displayed.       No results found for: CKTOTAL  TSH   Date Value Ref Range Status   05/20/2022 1.65 0.40 - 4.00 mU/L Final     Lab Results   Component Value Date    URIC 10.4 06/17/2022       Inflammatory markers  Lab Results   Component Value Date    CRP 28.0 06/19/2022     Lab Results   Component Value Date    SED 39 06/19/2022     Ferritin   Date Value Ref Range Status   06/24/2022 365 31 - 409 ng/mL Final   05/20/2022 406 (H) 26 - 388 ng/mL Final       UA RESULTS:  Recent Labs   Lab Test 07/12/22  1045 05/19/22  1810 05/16/22  1706   COLOR Yellow Yellow Light Yellow   APPEARANCE Slightly Cloudy* Clear Clear   URINEGLC Negative Negative Negative   URINEBILI Negative Negative Negative   URINEKETONE Negative 10 * Negative   SG 1.039* 1.016 1.026   UBLD Negative Negative Negative   URINEPH 5.5 6.0 5.5   PROTEIN 70 * 10 * 20 *   NITRITE Negative Negative Negative   LEUKEST Negative Negative Negative   RBCU 10* <1 0   WBCU 7* 0 <1         Autoimmunity labs:     Lab Results   Component Value Date    RHF <6 06/19/2022     Lab Results    Component Value Date    CCPIGG 1.7 06/19/2022     No results found for: ANCA  Lab Results   Component Value Date    L8ZUVET 166 (H) 07/03/2022    N6UTOQS 95 05/19/2022     Lab Results   Component Value Date    F6BPZME 39 07/03/2022    U0MMSMD 21 05/19/2022     No results found for: JORGE  Lab Results   Component Value Date    DNA 22.0 (H) 05/19/2022     Lab Results   Component Value Date    SSAIGG Negative 06/19/2022    SSBIGG Negative 06/19/2022    SCLIGG Negative 06/19/2022       IMAGING:

## 2022-07-18 NOTE — PROGRESS NOTES
[facility]    Cardiology Progress Note- Cards 2        Date of Admission:  6/17/2022     Assessment & Plan: HVSL   Dandy EDUARD Sands is a 61 year old male with PMH of lupus, antiphospholipid syndrome, HTN, HLD, HFrEF 2/2 ICM who presented with cardiogenic shock c/b multiorgan failure (JOSE, shock liver) requiring mechanical support with IABP, now s/p HM3 LVAD 7/8/22 by Dr. Zamora with intraoperative course c/b RV failure s/p 29F Protek duo via RIJ. Post op course c/b hemopericardium s/p repeat washout with chest left open. Chest now closed 7/13/22     #HFrEF 2/2 ICM s/p HM3 LVAD in setting of cardiogenic shock (SCAI D)  #RV failure s/p Protek duo temporary RVAD  #Post chest closure hemopericardium s/p repeat washout  Patient with ICM s/p HM3 LVAD 7/8/22 by Dr. Zamora in setting of presentation for cardiogenic shock requiring MCS with IABP as prior to HM (initially evaluated for heart transplant, however noted to have substantially elevated DSAs during course of care, so decision made to proceed with LVAD given patient already on temporary MCS). Intraoperative course c/b RV failure resulting in cardiogenic shock requiring high dose pressors necessitating RVAD support. Chest closed 7/11 and Ashli weaned. However developed tachycardia, lactic acidosis and decreased hemoglobin with CT scan noting hemopericardium. Returned to OR where underwent washout with large clot burden noted over the right atrium and around LVAD outflow graft. Chest left open and returned to ICU where pressors were able to be weaned. Noted to have stable RVAD/LVAD flows throughout and post procedurally. Ultimately returned to OR for repeat closure. Currently hemodynamically stable with stable end-organ function and hemoglobin. Currently attempting PSTs but with difficulty with mental status. Weaning RVAD support   -LVAD: continues to have good flows with few alarms with rare PI event alarms; will  continue at current speed  -RVAD: no evidence of increasing hemolysis, flows appear balanced, oxygenating well --> Decrease RVAD flow by 0.5 L to 2.0 L. Will monitor response/hemodynamics  -AC, antiplatelets: per surgical service; has had some post-operative bleeding and prior hemopericardium; will need to monitor coags closely in setting of heparin and ASA use  -Volume status: appears hypervolemic - agree with repeat dose lasix with goal net negative 1L  -rhythm: device interrogation done on 7/15 and noted to be sinus  -hypertension: continue hydralazine 25 mg q8h    Of note, when appropriate, consider resuming patient's Cellcept.      The patient's care was discussed with the Attending Physician, Dr. Lawrence and Bedside Nurse.    Emelyn Meneses MD  [facility]    ______________________________________________________________________    Interval History   Nursing notes reviewed. BRAXTON. Working on wean of sedation this AM for PST    Data reviewed today: I reviewed all medications, new labs and imaging results over the last 24 hours    Physical Exam   Vital Signs: Temp: 99.1  F (37.3  C) Temp src: Esophageal BP: (!) 80/68 Pulse: (!) 123   Resp: 17 SpO2: 98 % O2 Device: Mechanical Ventilator    Weight: 181 lbs 14.07 oz  General Appearance: Intubated, sedated male in ICU bed  Respiratory: ventilated lung sounds, clear anterior breath sounds  Cardiovascular: vad hum, driveline site c/d/i, protek in place, chest incisions c/d/i, JVD ~14 cm  GI: soft, bs active   Skin: warm, well perfused, 2+ pitting edema to the mid-tibia  Neuro: sedated, intubated    Data   Recent Labs   Lab 07/18/22  0422 07/17/22  2203 07/17/22  2155 07/17/22  1603 07/17/22  1554 07/17/22  0813 07/17/22  0408   WBC 16.3*  --  13.3*  --  14.4*   < > 15.1*   HGB 7.5*  --  7.3*  --  7.2*   < > 8.0*   MCV 94  --  93  --  93   < > 92     --  150  --  144*   < > 146*   INR 1.23*  --  1.24*  --  1.25*   <  > 1.11    139  --   --  138   < > 133*   POTASSIUM 4.1 3.5  --   --  3.7   < > 3.9   CHLORIDE 108* 105  --   --  105   < > 103   CO2 23 24  --   --  22   < > 22   BUN 27.9* 27.9*  --   --  29.0*   < > 29.4*   CR 0.62* 0.69  --   --  0.69   < > 0.67   ANIONGAP 11 10  --   --  11   < > 8   ELDA 8.0* 8.1*  --   --  7.8*   < > 8.1*   * 128*  --  132* 139*   < > 129*   ALBUMIN 2.6*  --   --   --   --   --  2.6*   ALT 13  --   --   --   --   --  13    < > = values in this interval not displayed.       Medications     dexmedetomidine Stopped (07/17/22 2100)     dextrose       fentaNYL 100 mcg/hr (07/18/22 0600)     HEParin 1,100 Units/hr (07/18/22 0600)     propofol (DIPRIVAN) infusion 50 mcg/kg/min (07/18/22 0630)       acetaminophen  975 mg Oral or Feeding Tube Q8H CAMMY     aspirin  81 mg Oral or Feeding Tube Daily     DULoxetine  30 mg Oral Daily     fluticasone-salmeterol  2 puff Inhalation BID     hydrALAZINE  25 mg Oral or Feeding Tube Q8H CAMMY     hydroxychloroquine  200 mg Oral BID     insulin aspart  1-12 Units Subcutaneous Q4H     insulin glargine  35 Units Subcutaneous QAM AC     melatonin  10 mg Oral QPM     multivitamins w/minerals  15 mL Per Feeding Tube Daily     oxyCODONE  10 mg Oral or Feeding Tube Q4H     pantoprazole  40 mg Per Feeding Tube QAM AC     piperacillin-tazobactam  4.5 g Intravenous Q6H     polyethylene glycol  17 g Oral or Feeding Tube Daily     protein modular  1 packet Per Feeding Tube TID     QUEtiapine  100 mg Oral or Feeding Tube At Bedtime     QUEtiapine  50 mg Oral or Feeding Tube BID     senna-docusate  1 tablet Oral or Feeding Tube BID     sodium chloride (PF)  3 mL Intracatheter Q8H     sodium chloride (PF)  3 mL Intracatheter Q8H       TTE 7/17/2022:  HM3 LVAD at 5200 RPM and Protek Duo RVAD at 4.3L/min.  Normal LV size with severely reduced global LV function, LVEF<20%. LVIDd= 4.6  cm.  Normal RV size and mildly reduced RV function, RVFAC 31%. The ventricular  septum is  midline.  The aortic valve opens minimally with each beat. There is no AI.  LVAD inflow cannula is visualized in the LV apex. Normal Doppler interrogation  of the LVAD inflow cannula.  LVAD outflow graft is not well assessed.  Organized material is present in the pericardial space, most prominently  anterior to the RV free wall. There is no evidence of pericardial tamponade.  This was also present on the 7/12/22 study, but is better imaged on today's  study.     This study was compared with the study from 7/12/22: Image quality is  significantly improved. There has otherwise been no significant change,  however the prior study was extremely limited.

## 2022-07-18 NOTE — PROGRESS NOTES
CV ICU PROGRESS NOTE  07/18/2022        CO-MORBIDITIES  1. Cardiogenic shock (H)  (primary encounter diagnosis)  2. Ischemic cardiomyopathy  3. Heart failure with reduced ejection fraction, NYHA class III (H)  4. Coronary artery disease involving native coronary artery of native heart without angina pectoris      ASSESSMENT Dandy Sands is a 61 year old male with a PMH of CAD s/p PCI to LAD, MI, ICM, acute on chronic heart failure, s/p CRT-D, severe MR, antiphospholipid antibody syndrome on chronic warfarin, SLE, HTN, HLD, recent hospitalization 5/16-5/26 s/p RCA, LAD stenting who underwent RVAD (29F Protek duo RIJ) LVAD placement (HeartMate 3) on 7/8/22 by Dr. Zamora. Intraoperative course complicated by IABP dysfunction and RV failure, requiring crash onto bypass / vasopressor support, NO, and protek placement. Now s/p chest closure and NO wean 7/11. Return to OR for hemopericardium (7/12) and chest re-closure 7/13.    Today's Progress:  - 60mg lasix x1 goal net -0 to -500 mL  - Wean off propofol gtt that was added overnight for increased agitation  - Left upper extremity venous doppler- no DVT      PLAN:   Neuro/ pain/ sedation:  #Acute Postoperative pain  #Depression   #Anxiety due to a medical condition  #Insomnia  - Monitor neurological status. Notify the MD for any acute changes in exam.  - Pain: fentanyl gtt. Scheduled tylenol, scheduling oxycodone 10mg q4h. PRN tylenol, oxycodone, dilaudid. Wean fentanyl  - Sedation: precedex, propofol gtt, RASS 0 to -1. Wean propofol.  - Duloxetine 30mg  - Melatonin 10 mg HS  - Seroquel 50 / 50 / 100  - Holding PTA meds: hydroxyzine 25mg PRN, zolpidem 5mg, melatonin 3mg, lorazepam 0.5mg    Pulmonary:   #Acute hypoxic respiratory failure on iMV  #Mild-moderate emphysema  #History of COVID-19  - Off nitric 7/12  - Ventilated: RR- 12, Tv- 450, PEEP- 5, FiO2- 35%  - Daily PST and attempt to wean    Cardiovascular:    #S/p LVAD placement (HeartMate 3) on 7/8/22   #S/p RVAD  (29F Protek duo RIJ) on 22  #S/p chest closure   #Cardiogenic shock  #Advanced ischemic cardiomyopathy, class IV, stage D  #CAD s/p PCI to LAD ()  #S/p CRT-D ()  #History of MI ()  #Severe MR  #Antiphospholipid antibody syndrome on chronic warfarin  #HTN, HLD  #Recent hospitalization - s/p RCA, LAD stenting  #Atrial fibrillation   - Monitor hemodynamic status. Chest closure and oxygenator splicing . Reopened  for I&D and left open, plan for closure .  - Goal MAP > 65   - PTA meds: atorvastatin 40mg, clopidogrel 75mg, lasix 40mg, warfarin 5mg  - LVAD management per [facility] service   - flows stable, circuit functioning w/o clots/fibrin  - AC Plan: high intensity heparin gtt  - Aspirin 81 mg  - Hydralazine uriel and prn MAP >85  - Formal TTE - HM3 LVAD at 5200 RPM and Protek Duo RVAD at 4.3L/min.  Normal LV size with severely reduced global LV function, LVEF<20%. LVIDd= 4.6  Cm. Normal RV size and mildly reduced RV function, RVFAC 31%. The ventricular  septum is midline. The aortic valve opens minimally with each beat. There is no AI.  LVAD inflow cannula is visualized in the LV apex. Normal Doppler interrogation  of the LVAD inflow cannula. LVAD outflow graft is not well assessed.  Organized material is present in the pericardial space, most prominently  anterior to the RV free wall. There is no evidence of pericardial tamponade.  This was also present on the 22 study, but is better imaged on today's  study.          GI / Nutrition:   #GERD  #Congestive hepatopathy in the setting of cardiac insuffiency  - NPO 2/2 intubation   - Titrate NJT to goal  - Bowel regimen (miralax / senna)  - Nutrition consulted  - Trend LFT's every alternate day    Hematemesis / oral mucosal bleedin/12 x2, 7/13 x1 (oral, around ETT/ETT clear).   - continue PPI once daily  - hgb stable   - OG without patience bloody output     Renal/ Fluid Balance:    - Strict I/O, daily weights   -  Avoid/limit nephrotoxins as able  - Replete lytes PRN per protocol  - PTA meds: tamsulosin 0.4mg (held)  - Fluid goal 0 to -500 ml    Endocrine:    #Stress induced hyperglycemia  Preop A1c 5.5%  - Insulin lantus and sliding scale  - Goal BG <180 for optimal healing       ID/ Antibiotics:  #Periopearive antibiosis (s/p course of Cefepime, vancomycin, rifampin, fluconazole)  #Enterococcus faecalis PNA  - Discontinue Cefepime and Vancomycin (7/13-7/14), chest now closed   - Trend fever curve   - Pan culture (7/12): Bcx x2, UA/UC -> negative     -7/14 Endotracheal sputum culture (4+ candida albicans, 1+ E.Coli, 3+ enterococcus faecalis)  - Zosyn for HAP(7/15-7/22)    Heme:     #Acute blood loss anemia  #Acute blood loss thrombocytopenia  #History of DVT and PE   #Antiphospholipid antibody syndrome  #History of iron deficiency anemia  - Monitor for s/sx of bleeding  - s/p 3u pRBC, 2u Plt 7/12 perioperative needs  - transfused 2 units 7/15, 1 unit 7/18  - Trend CBC and coags.  - Hgb goal >8  - Plt goal > 20    Rheumatology:  #SLE  - Chronic, symptoms include arthritis, photosensitivity, fatigue, and skin manifestations  - PTA meds: hydroxychloroquine 200mg, resumed 7/16   Will need cellcept restarted when appropriate     Prophylaxis:    - DVT prophylaxis: SCD + high intensity heparin gtt   - Aspirin 81 mg  - PPI  - Bowel regimen  - PT/OT  - Left upper extremity US(7/18)- no evidence of DVT    Lines / Tubes / Drains:  - CVC Double Lumen Left Femoral(7/8-)  - R brachial triple lumen PICC(6/17-)  - Left Axillary A-line(7/18-)  - ETT(7/8-)  - Fitzgerald(7/8-)  - NJT(7/11-)  - OGT(7/12-)  - Chest Tubes(7/8-)      Disposition:  - CVICU.    Patient seen, findings and plan discussed with CV ICU staff, Dr. Davidson.    Usha Celestin MD  Surgery PGY-4    ----------------------------------------------------------------      Subjective:  No acute events.     OBJECTIVE:   1. VITAL SIGNS:   Temp:  [97.7  F (36.5  C)-99.3  F (37.4  C)] 99.1  F  (37.3  C)  Pulse:  [] 123  Resp:  [8-33] 17  BP: (80)/(68) 80/68  MAP:  [64 mmHg-111 mmHg] 93 mmHg  Arterial Line BP: ()/() 100/83  FiO2 (%):  [35 %] 35 %  SpO2:  [89 %-100 %] 98 %  Vent Mode: CMV/AC  (Continuous Mandatory Ventilation/ Assist Control)  FiO2 (%): 35 %  Resp Rate (Set): 12 breaths/min  Tidal Volume (Set, mL): 450 mL  PEEP (cm H2O): 5 cmH2O  Pressure Support (cm H2O): 7 cmH2O  Resp: 17      2. INTAKE/ OUTPUT:   I/O last 3 completed shifts:  In: 2757.28 [I.V.:1277.28; NG/GT:280]  Out: 3170 [Urine:2140; Emesis/NG output:300; Stool:400; Chest Tube:330]    3. PHYSICAL EXAMINATION:   General: critically ill  Neuro: sedated lightly but hoping to wean today if agitation remains stable  Resp: pressure support this AM  CV: Sinus tachy  Abdomen: Soft, Non-distended, Non-tender  Incisions: c/d/i  Extremities: warm and well perfused    4. INVESTIGATIONS:   Arterial Blood Gases   Recent Labs   Lab 07/18/22 0423 07/17/22 2155 07/17/22  1555 07/17/22  1158   PH 7.43 7.44 7.43 7.39   PCO2 39 38 37 41   PO2 100 110* 119* 130*   HCO3 26 26 25 24     Complete Blood Count   Recent Labs   Lab 07/18/22 0422 07/17/22  2155 07/17/22  1554 07/17/22  1006   WBC 16.3* 13.3* 14.4* 15.9*   HGB 7.5* 7.3* 7.2* 7.1*    150 144* 143*     Basic Metabolic Panel  Recent Labs   Lab 07/18/22 0422 07/17/22 2203 07/17/22  1603 07/17/22  1554 07/17/22  1201 07/17/22  1006    139  --  138  --  135*   POTASSIUM 4.1 3.5  --  3.7  --  3.9   CHLORIDE 108* 105  --  105  --  105   CO2 23 24  --  22  --  22   BUN 27.9* 27.9*  --  29.0*  --  30.8*   CR 0.62* 0.69  --  0.69  --  0.58*   * 128* 132* 139*   < > 120*    < > = values in this interval not displayed.     Liver Function Tests  Recent Labs   Lab 07/18/22  0422 07/17/22  2155 07/17/22  1554 07/17/22  1006 07/17/22  0408 07/16/22  0944 07/16/22  0452 07/15/22  1027 07/15/22  0345   ALT 13  --   --   --  13  --  17  --  20   ALBUMIN 2.6*  --   --   --   2.6*  --  2.4*  --  2.3*   INR 1.23* 1.24* 1.25* 1.15 1.11   < > 1.12   < > 1.23*    < > = values in this interval not displayed.     Pancreatic Enzymes  No lab results found in last 7 days.  Coagulation Profile  Recent Labs   Lab 22  0422 22  2155 22  1554 22  1006   INR 1.23* 1.24* 1.25* 1.15   PTT 98* 111* 111* 104*         5. RADIOLOGY:   Recent Results (from the past 24 hour(s))   XR Chest Port 1 View    Narrative    Exam: XR CHEST PORT 1 VIEW, 2022 10:06 AM    Indication: RVAD/LVAD    Comparison: Chest radiograph 2022    Findings:   Portable AP chest radiograph. Postsurgical changes of the chest with  intact median sternotomy wires. Endotracheal tube tip projects over  the low thoracic trachea. Enteric tubes course below the diaphragm and  beyond the field-of-view. Right IJ RVAD tip projects over the main  portal trunk. Right upper extremity PICC tip in the region of the mid  SVC. Stable left chest wall cardiac device and the LVAD. Stable  mediastinal drains and left basilar chest tube.    Stable cardiac mediastinal silhouette. Similar small pleural  effusions. No appreciable pneumothorax. Low lung volumes with  continued bilateral diffuse mixed interstitial and airspace opacities.      Impression    Impression:   1.  Stable support devices.  2.  No significant change in small pleural effusions and diffuse  pulmonary opacities.    MILES KENNY MD         SYSTEM ID:  [id number]   Echo Complete   Result Value    LVEF  10-20% (severely reduced)    Narrative    [id number]  [id number]  [id number]  [id number]^DARRELL^MARCELLA^PAOLA     [facility],[place]  Echocardiography Laboratory  [address] [place] [id number]     Name: OLEG KAUR  MRN: [id number]  : 1960  Study Date: 2022 10:46 AM  Age: 61 yrs  Gender: Male  Patient Location: [facility]  Reason For Study: Heart Failure  Ordering Physician: MARCELLA RAMÍREZ  Performed By: Saba  Johan     BSA: 1.9 m2  Height: 67 in  Weight: 179 lb  HR: 114  BP: 110/91 mmHg  ______________________________________________________________________________  Procedure  Complete Portable Echo Adult.  ______________________________________________________________________________  Interpretation Summary  HM3 LVAD at 5200 RPM and Protek Duo RVAD at 4.3L/min.  Normal LV size with severely reduced global LV function, LVEF<20%. LVIDd= 4.6  cm.  Normal RV size and mildly reduced RV function, RVFAC 31%. The ventricular  septum is midline.  The aortic valve opens minimally with each beat. There is no AI.  LVAD inflow cannula is visualized in the LV apex. Normal Doppler interrogation  of the LVAD inflow cannula.  LVAD outflow graft is not well assessed.  Organized material is present in the pericardial space, most prominently  anterior to the RV free wall. There is no evidence of pericardial tamponade.  This was also present on the 7/12/22 study, but is better imaged on today's  study.     This study was compared with the study from 7/12/22: Image quality is  significantly improved. There has otherwise been no significant change,  however the prior study was extremely limited.  ______________________________________________________________________________  Left Ventricle  Left ventricular size is normal. Diastolic function not assessed due to  presence of LVAD. Left ventricular function is decreased. The ejection  fraction is 10-20% (severely reduced). Severe diffuse hypokinesis is present.  LVAD cannula was seen in the expected anatomic position in the LV apex.     Right Ventricle  The right ventricle is normal size. Global right ventricular function is  mildly reduced. RVFAC 31%.     Atria  The atria cannot be assessed.     Mitral Valve  The mitral valve is normal.     Aortic Valve  The aortic valve opens minimally with each beat. There is no AI.     Tricuspid Valve  Moderate tricuspid insufficiency is present. PA  pressure cannot be accurately  assessed due to significant TR.     Pulmonic Valve  The valve leaflets are not well visualized. Trace pulmonic insufficiency is  present.     Vessels  The aorta root is normal. Dilation of the inferior vena cava is present with  normal respiratory variation in diameter. Unable to assess mean RA pressure  given the patient is on a ventilator.     Pericardium  Organized material is present in the pericardial space, most prominently  anterior to the RV free wall. There is no evidence of pericardial tamponade.  This was also present on the 22 study, but is better imaged on today's  study.     Compared to Previous Study  This study was compared with the study from 22 . Image quality is  significantly improved. There has otherwise been no significant change,  however the prior study was extremely limited.  ______________________________________________________________________________  Doppler Measurements & Calculations  TR max chacorta: 150.9 cm/sec  TR max P.1 mmHg     ______________________________________________________________________________  Report approved by: Nakia Prakash 2022 12:02 PM         XR Chest Port 1 View    Narrative    EXAM: XR CHEST PORT 1 VIEW  2022 1:15 AM     HISTORY:  intubated, RVAD and LVAD       COMPARISON:  Radiographs 2022, 2022.    TECHNIQUE: AP supine view of the chest    FINDINGS:   Devices, lines, tubes: Right internal jugular ECMO cannula,  endotracheal tube, left chest wall implantable cardiac defibrillator  and leads, LVAD, left basilar chest tube, and mediastinal/pericardial  drains are in similar position. Feeding tube courses inferior to the  left hemidiaphragm with tip outside the field-of-view. Tip is at least  to the body of the stomach. Median sternotomy wires are intact.    The trachea is midline. The cardiomediastinal silhouette is stable.  The pulmonary vasculature is indistinct. Persistent low lung  volumes  with continued bilateral mixed interstitial and airspace opacities.  Similar bilateral small pleural effusions. No appreciable  pneumothorax. No acute osseous abnormality.       Impression    IMPRESSION:   1. Support devices are in similar positioning.  2. Persistent low lung volumes with similar small bilateral pleural  effusions and diffuse mixed pulmonary opacities.    I have personally reviewed the examination and initial interpretation  and I agree with the findings.    LAI HERNADEZ MD         SYSTEM ID:  [id number]       =========================================

## 2022-07-18 NOTE — PROGRESS NOTES
VAD Shift Summary: [id number]     VAD Equipment:  Primary console serial number: [id number]  Backup console serial number: [id number]  Circuit lot number: [id number]  Pump head lot number: [id number]    Patient remains on RVAD, all equipment is functioning and alarms are appropriately set. RPM's [id number] with flow range 2.08-2.45 L/min. Stable fibrin throughout circuit and cannula. Cannulas are secure with no bleeding from site.     Significant Shift Events: weaned flows from 2.5 LPM to 2 LPM    Vent settings:  Vent Mode: CMV/AC  (Continuous Mandatory Ventilation/ Assist Control)  FiO2 (%): 35 %  Resp Rate (Set): 12 breaths/min  Tidal Volume (Set, mL): 450 mL  PEEP (cm H2O): 5 cmH2O  Pressure Support (cm H2O): 7 cmH2O  Resp: 14  .    Heparin is running at 1500 unit(s)/hr, increased Xa goals to 0.3-0.7    Urine output is as charted per RN, blood loss was minimal. Product given included 1 unit of PRBC's.       Intake/Output Summary (Last 24 hours) at 7/18/2022 1802  Last data filed at 7/18/2022 1700  Gross per 24 hour   Intake 2916.46 ml   Output 3280 ml   Net -363.54 ml       ECHO:  No results found for this or any previous visit.  No results found for this or any previous visit.      CXR:  Recent Results (from the past 24 hour(s))   XR Chest Port 1 View    Narrative    EXAM: XR CHEST PORT 1 VIEW  7/18/2022 1:15 AM     HISTORY:  intubated, RVAD and LVAD       COMPARISON:  Radiographs 7/17/2022, 7/16/2022.    TECHNIQUE: AP supine view of the chest    FINDINGS:   Devices, lines, tubes: Right internal jugular ECMO cannula,  endotracheal tube, left chest wall implantable cardiac defibrillator  and leads, LVAD, left basilar chest tube, and mediastinal/pericardial  drains are in similar position. Feeding tube courses inferior to the  left hemidiaphragm with tip outside the field-of-view. Tip is at least  to the body of the stomach. Median sternotomy wires are intact.    The trachea is midline. The cardiomediastinal  silhouette is stable.  The pulmonary vasculature is indistinct. Persistent low lung volumes  with continued bilateral mixed interstitial and airspace opacities.  Similar bilateral small pleural effusions. No appreciable  pneumothorax. No acute osseous abnormality.       Impression    IMPRESSION:   1. Support devices are in similar positioning.  2. Persistent low lung volumes with similar small bilateral pleural  effusions and diffuse mixed pulmonary opacities.    I have personally reviewed the examination and initial interpretation  and I agree with the findings.    LAI HERNADEZ MD         SYSTEM ID:  [id number]   US Upper Extremity Venous Duplex Left Portable    Narrative    EXAMINATION: DOPPLER VENOUS ULTRASOUND OF THE LEFT UPPER EXTREMITY,  7/18/2022 11:04 AM     COMPARISON: 5/20/2022    HISTORY: Edema.  Recent LVAD and RVAD placement 7/8/2022 given history  of cardiogenic shock, advanced ischemic cardiomyopathy.      TECHNIQUE:  Gray-scale evaluation with compression, spectral flow and  color Doppler assessment of the deep venous system of the left upper  extremity.    FINDINGS:  Left: Normal blood flow and waveforms are demonstrated in the internal  jugular, innominate, subclavian, and axillary veins. There is normal  compressibility of the visualized brachial and basilic mid arm.  Left  upper extremity bandage mid upper arm obscures brachial and basilic  veins at this point.  Patent left cephalic vein; incidentally noted  left cephalic vein with thick walls.  Pulsatile venous waveforms  noted.    Nonspecific mild subcutaneous edema in the left upper extremity.      Impression    IMPRESSION:  No evidence of left upper extremity deep venous  thrombosis.    I have personally reviewed the examination and initial interpretation  and I agree with the findings.    JOSE M OSHEA MD         SYSTEM ID:  [id number]       Labs:  Recent Labs   Lab 07/18/22  1528 07/18/22  1148 07/18/22  0817 07/18/22  0423   PH 7.49* 7.48*  7.44 7.43   PCO2 34* 35 37 39   PO2 112* 129* 121* 100   HCO3 26 26 25 26   O2PER 35 35 35 35       Lab Results   Component Value Date    HGB 7.7 (L) 07/18/2022    PHGB 50 (H) 07/18/2022     07/18/2022    FIBR 499 (H) 07/18/2022    INR 1.31 (H) 07/18/2022     (H) 07/18/2022    DD 5.96 (H) 07/18/2022    ANTCH 97 07/18/2022         Plan is to continue RVAD support.      Inna Marroquin, RT  ECMO Specialist  7/18/2022 6:02 PM

## 2022-07-18 NOTE — PROCEDURES
Arterial Line Insertion Note:    Arterial Location: CVICU  Line Placement:   This line was placed 9:30 AM and ending at 9:45 AM  Procedure   Procedure: arterial line       Laterality: right       Insertion Site: axillary  Sterile Prep        Standard elements of sterile barrier followed       Skin prep: Chloraprep  Insertion/Injection        Technique: ultrasound guided        1. Ultrasound was used to evaluate the access site.       2. Artery evaluated via ultrasound for patency/adequacy.       3. Using real-time ultrasound the needle/catheter was observed entering the artery/vein.       Catheter Type/Size: 20 G, 12 cm  Narrative        Tegaderm dressing used.       Complications: None apparent,        Arterial waveform: N/A       IBP within 10% of NIBP: Yes      Usha Celestin MD  PGY-4  Anesthesia

## 2022-07-18 NOTE — PLAN OF CARE
Nights:  PERLA  Pt opens eyes, tracks about 50% of time. Does not follow commands.  Restless without Propofol infusion.  FINCH.  Afebrile. CMV 25%/12/450/5.  , MAP 80's  CMV 35%/12/450/5.  LVAD/RVAD numbers appropriate with no alarms.  Fitzgerald 30-60cc/hr.  CT 10-20cc/hr each container.  Mg+  and K+ replaced.  Precedex is off and propofol on overnight. Pt well sedated overnight.   P  Increase activity. Up to recliner.  Pressure support, reduce sedation as tolerated. Blood infusion ordered for hgb 7.5.  goal is hgb >8.

## 2022-07-18 NOTE — PLAN OF CARE
Goal Outcome Evaluation:    Plan of Care Reviewed With: patient, son, daughter     Overall Patient Progress: no change     RVAD weaned again today, pt tolerating well. Titrated off propofol by midday, pt with dex infusing (see MAR). Restless, occasional movements in uncoordinated manner. Unable to follow commands, incl opening eyes. Groin CVC discontinued today. MAP at goal. 60mg lasix this morning with good response. Up in chair in late afternoon.  PST x2.5 hours today, fair tolerance. Currently back on vent. 1u PRBC given this AM. Son and daughter at bedside, updated on plan of care.

## 2022-07-18 NOTE — PROGRESS NOTES
D: Stopped by to see patient. No VAD related questions or concerns at this time.   I: Discussed POC with the nurse. RVAD speed/flow is being weaned down. No family is present.  P: Continue to follow patient and address any questions or concerns patient and or caregiver may have.

## 2022-07-19 ENCOUNTER — APPOINTMENT (OUTPATIENT)
Dept: GENERAL RADIOLOGY | Facility: CLINIC | Age: 62
DRG: 001 | End: 2022-07-19
Attending: STUDENT IN AN ORGANIZED HEALTH CARE EDUCATION/TRAINING PROGRAM
Payer: COMMERCIAL

## 2022-07-19 LAB
ACT BLD: 190 SECONDS (ref 74–150)
ALBUMIN SERPL BCG-MCNC: 2.5 G/DL (ref 3.5–5.2)
ALP SERPL-CCNC: 146 U/L (ref 40–129)
ALT SERPL W P-5'-P-CCNC: 11 U/L (ref 10–50)
ANION GAP SERPL CALCULATED.3IONS-SCNC: 10 MMOL/L (ref 7–15)
ANION GAP SERPL CALCULATED.3IONS-SCNC: 11 MMOL/L (ref 7–15)
ANION GAP SERPL CALCULATED.3IONS-SCNC: 8 MMOL/L (ref 7–15)
APTT PPP: 150 SECONDS (ref 22–38)
APTT PPP: 194 SECONDS (ref 22–38)
APTT PPP: 197 SECONDS (ref 22–38)
APTT PPP: 55 SECONDS (ref 22–38)
AST SERPL W P-5'-P-CCNC: 24 U/L (ref 10–50)
AT III ACT/NOR PPP CHRO: 84 % (ref 85–135)
ATRIAL RATE - MUSE: 111 BPM
ATRIAL RATE - MUSE: 39 BPM
BACTERIA BLD CULT: NO GROWTH
BACTERIA BLD CULT: NO GROWTH
BASE EXCESS BLDA CALC-SCNC: 2.4 MMOL/L (ref -9–1.8)
BASE EXCESS BLDA CALC-SCNC: 2.5 MMOL/L (ref -9–1.8)
BASE EXCESS BLDA CALC-SCNC: 2.7 MMOL/L (ref -9–1.8)
BASE EXCESS BLDA CALC-SCNC: 3.5 MMOL/L (ref -9–1.8)
BASE EXCESS BLDA CALC-SCNC: 4.4 MMOL/L (ref -9–1.8)
BASE EXCESS BLDA CALC-SCNC: 4.4 MMOL/L (ref -9–1.8)
BASE EXCESS BLDV CALC-SCNC: 0.9 MMOL/L (ref -7.7–1.9)
BASOPHILS # BLD AUTO: 0.1 10E3/UL (ref 0–0.2)
BASOPHILS # BLD MANUAL: 0 10E3/UL (ref 0–0.2)
BASOPHILS # BLD MANUAL: 0.2 10E3/UL (ref 0–0.2)
BASOPHILS # BLD MANUAL: 0.2 10E3/UL (ref 0–0.2)
BASOPHILS NFR BLD AUTO: 0 %
BASOPHILS NFR BLD MANUAL: 0 %
BASOPHILS NFR BLD MANUAL: 1 %
BASOPHILS NFR BLD MANUAL: 1 %
BILIRUB DIRECT SERPL-MCNC: 0.32 MG/DL (ref 0–0.3)
BILIRUB SERPL-MCNC: 0.6 MG/DL
BLD PROD TYP BPU: NORMAL
BLOOD COMPONENT TYPE: NORMAL
BUN SERPL-MCNC: 24.2 MG/DL (ref 8–23)
BUN SERPL-MCNC: 25.4 MG/DL (ref 8–23)
BUN SERPL-MCNC: 25.7 MG/DL (ref 8–23)
BURR CELLS BLD QL SMEAR: SLIGHT
CA-I BLD-MCNC: 4.3 MG/DL (ref 4.4–5.2)
CA-I BLD-MCNC: 4.4 MG/DL (ref 4.4–5.2)
CA-I BLD-MCNC: 4.5 MG/DL (ref 4.4–5.2)
CA-I BLD-MCNC: 4.6 MG/DL (ref 4.4–5.2)
CALCIUM SERPL-MCNC: 8.1 MG/DL (ref 8.8–10.2)
CALCIUM SERPL-MCNC: 8.1 MG/DL (ref 8.8–10.2)
CALCIUM SERPL-MCNC: 8.3 MG/DL (ref 8.8–10.2)
CHLORIDE SERPL-SCNC: 106 MMOL/L (ref 98–107)
CODING SYSTEM: NORMAL
CREAT SERPL-MCNC: 0.63 MG/DL (ref 0.67–1.17)
CREAT SERPL-MCNC: 0.65 MG/DL (ref 0.67–1.17)
CREAT SERPL-MCNC: 0.68 MG/DL (ref 0.67–1.17)
CROSSMATCH: NORMAL
D DIMER PPP FEU-MCNC: 4.28 UG/ML FEU (ref 0–0.5)
D DIMER PPP FEU-MCNC: 6.9 UG/ML FEU (ref 0–0.5)
DEPRECATED HCO3 PLAS-SCNC: 24 MMOL/L (ref 22–29)
DEPRECATED HCO3 PLAS-SCNC: 24 MMOL/L (ref 22–29)
DEPRECATED HCO3 PLAS-SCNC: 27 MMOL/L (ref 22–29)
DIASTOLIC BLOOD PRESSURE - MUSE: NORMAL MMHG
DIASTOLIC BLOOD PRESSURE - MUSE: NORMAL MMHG
EOSINOPHIL # BLD AUTO: 0.2 10E3/UL (ref 0–0.7)
EOSINOPHIL # BLD MANUAL: 0.2 10E3/UL (ref 0–0.7)
EOSINOPHIL # BLD MANUAL: 0.3 10E3/UL (ref 0–0.7)
EOSINOPHIL # BLD MANUAL: 0.4 10E3/UL (ref 0–0.7)
EOSINOPHIL NFR BLD AUTO: 1 %
EOSINOPHIL NFR BLD MANUAL: 1 %
EOSINOPHIL NFR BLD MANUAL: 2 %
EOSINOPHIL NFR BLD MANUAL: 2 %
ERYTHROCYTE [DISTWIDTH] IN BLOOD BY AUTOMATED COUNT: 18.6 % (ref 10–15)
ERYTHROCYTE [DISTWIDTH] IN BLOOD BY AUTOMATED COUNT: 20.2 % (ref 10–15)
ERYTHROCYTE [DISTWIDTH] IN BLOOD BY AUTOMATED COUNT: 20.6 % (ref 10–15)
ERYTHROCYTE [DISTWIDTH] IN BLOOD BY AUTOMATED COUNT: 20.7 % (ref 10–15)
FIBRINOGEN PPP-MCNC: 493 MG/DL (ref 170–490)
FIBRINOGEN PPP-MCNC: 530 MG/DL (ref 170–490)
GFR SERPL CREATININE-BSD FRML MDRD: >90 ML/MIN/1.73M2
GLUCOSE BLDC GLUCOMTR-MCNC: 100 MG/DL (ref 70–99)
GLUCOSE BLDC GLUCOMTR-MCNC: 118 MG/DL (ref 70–99)
GLUCOSE BLDC GLUCOMTR-MCNC: 125 MG/DL (ref 70–99)
GLUCOSE BLDC GLUCOMTR-MCNC: 126 MG/DL (ref 70–99)
GLUCOSE BLDC GLUCOMTR-MCNC: 135 MG/DL (ref 70–99)
GLUCOSE BLDC GLUCOMTR-MCNC: 151 MG/DL (ref 70–99)
GLUCOSE SERPL-MCNC: 114 MG/DL (ref 70–99)
GLUCOSE SERPL-MCNC: 134 MG/DL (ref 70–99)
GLUCOSE SERPL-MCNC: 153 MG/DL (ref 70–99)
HCO3 BLD-SCNC: 26 MMOL/L (ref 21–28)
HCO3 BLD-SCNC: 27 MMOL/L (ref 21–28)
HCO3 BLD-SCNC: 28 MMOL/L (ref 21–28)
HCO3 BLD-SCNC: 28 MMOL/L (ref 21–28)
HCO3 BLDV-SCNC: 26 MMOL/L (ref 21–28)
HCT VFR BLD AUTO: 21.9 % (ref 40–53)
HCT VFR BLD AUTO: 25.5 % (ref 40–53)
HCT VFR BLD AUTO: 25.8 % (ref 40–53)
HCT VFR BLD AUTO: 26 % (ref 40–53)
HGB BLD-MCNC: 7 G/DL (ref 13.3–17.7)
HGB BLD-MCNC: 8.2 G/DL (ref 13.3–17.7)
HGB BLD-MCNC: 8.2 G/DL (ref 13.3–17.7)
HGB BLD-MCNC: 8.3 G/DL (ref 13.3–17.7)
HGB FREE PLAS-MCNC: 30 MG/DL
IMM GRANULOCYTES # BLD: 0.8 10E3/UL
IMM GRANULOCYTES NFR BLD: 4 %
INR PPP: 1.3 (ref 0.85–1.15)
INR PPP: 1.31 (ref 0.85–1.15)
INR PPP: 1.35 (ref 0.85–1.15)
INR PPP: 1.35 (ref 0.85–1.15)
INTERPRETATION ECG - MUSE: NORMAL
INTERPRETATION ECG - MUSE: NORMAL
ISSUE DATE AND TIME: NORMAL
LACTATE SERPL-SCNC: 1.4 MMOL/L (ref 0.7–2)
LACTATE SERPL-SCNC: 1.4 MMOL/L (ref 0.7–2)
LACTATE SERPL-SCNC: 1.5 MMOL/L (ref 0.7–2)
LACTATE SERPL-SCNC: 3 MMOL/L (ref 0.7–2)
LYMPHOCYTES # BLD AUTO: 1.1 10E3/UL (ref 0.8–5.3)
LYMPHOCYTES # BLD MANUAL: 0.7 10E3/UL (ref 0.8–5.3)
LYMPHOCYTES # BLD MANUAL: 1.3 10E3/UL (ref 0.8–5.3)
LYMPHOCYTES # BLD MANUAL: 1.4 10E3/UL (ref 0.8–5.3)
LYMPHOCYTES NFR BLD AUTO: 5 %
LYMPHOCYTES NFR BLD MANUAL: 4 %
LYMPHOCYTES NFR BLD MANUAL: 6 %
LYMPHOCYTES NFR BLD MANUAL: 9 %
MAGNESIUM SERPL-MCNC: 2.4 MG/DL (ref 1.7–2.3)
MCH RBC QN AUTO: 28.4 PG (ref 26.5–33)
MCH RBC QN AUTO: 28.4 PG (ref 26.5–33)
MCH RBC QN AUTO: 28.9 PG (ref 26.5–33)
MCH RBC QN AUTO: 29.3 PG (ref 26.5–33)
MCHC RBC AUTO-ENTMCNC: 31.5 G/DL (ref 31.5–36.5)
MCHC RBC AUTO-ENTMCNC: 31.8 G/DL (ref 31.5–36.5)
MCHC RBC AUTO-ENTMCNC: 32 G/DL (ref 31.5–36.5)
MCHC RBC AUTO-ENTMCNC: 32.5 G/DL (ref 31.5–36.5)
MCV RBC AUTO: 89 FL (ref 78–100)
MCV RBC AUTO: 89 FL (ref 78–100)
MCV RBC AUTO: 90 FL (ref 78–100)
MCV RBC AUTO: 92 FL (ref 78–100)
METAMYELOCYTES # BLD MANUAL: 0.3 10E3/UL
METAMYELOCYTES NFR BLD MANUAL: 2 %
MONOCYTES # BLD AUTO: 1.1 10E3/UL (ref 0–1.3)
MONOCYTES # BLD MANUAL: 0.4 10E3/UL (ref 0–1.3)
MONOCYTES # BLD MANUAL: 0.5 10E3/UL (ref 0–1.3)
MONOCYTES # BLD MANUAL: 0.7 10E3/UL (ref 0–1.3)
MONOCYTES NFR BLD AUTO: 6 %
MONOCYTES NFR BLD MANUAL: 2 %
MONOCYTES NFR BLD MANUAL: 3 %
MONOCYTES NFR BLD MANUAL: 4 %
MYELOCYTES # BLD MANUAL: 0.4 10E3/UL
MYELOCYTES # BLD MANUAL: 0.5 10E3/UL
MYELOCYTES # BLD MANUAL: 0.6 10E3/UL
MYELOCYTES NFR BLD MANUAL: 2 %
MYELOCYTES NFR BLD MANUAL: 3 %
MYELOCYTES NFR BLD MANUAL: 3 %
NEUTROPHILS # BLD AUTO: 16.7 10E3/UL (ref 1.6–8.3)
NEUTROPHILS # BLD MANUAL: 12.2 10E3/UL (ref 1.6–8.3)
NEUTROPHILS # BLD MANUAL: 16.3 10E3/UL (ref 1.6–8.3)
NEUTROPHILS # BLD MANUAL: 19.5 10E3/UL (ref 1.6–8.3)
NEUTROPHILS NFR BLD AUTO: 84 %
NEUTROPHILS NFR BLD MANUAL: 79 %
NEUTROPHILS NFR BLD MANUAL: 87 %
NEUTROPHILS NFR BLD MANUAL: 88 %
NRBC # BLD AUTO: 0 10E3/UL
NRBC # BLD AUTO: 0.2 10E3/UL
NRBC BLD AUTO-RTO: 0 /100
NRBC BLD MANUAL-RTO: 1 %
O2/TOTAL GAS SETTING VFR VENT: 35 %
O2/TOTAL GAS SETTING VFR VENT: 40 %
O2/TOTAL GAS SETTING VFR VENT: 65 %
OXYHGB MFR BLD: 97 % (ref 92–100)
OXYHGB MFR BLD: 98 % (ref 92–100)
OXYHGB MFR BLD: 98 % (ref 92–100)
OXYHGB MFR BLD: 99 % (ref 92–100)
OXYHGB MFR BLDV: 35 % (ref 70–75)
P AXIS - MUSE: NORMAL DEGREES
P AXIS - MUSE: NORMAL DEGREES
PCO2 BLD: 34 MM HG (ref 35–45)
PCO2 BLD: 35 MM HG (ref 35–45)
PCO2 BLD: 37 MM HG (ref 35–45)
PCO2 BLDV: 44 MM HG (ref 40–50)
PH BLD: 7.49 [PH] (ref 7.35–7.45)
PH BLD: 7.5 [PH] (ref 7.35–7.45)
PH BLD: 7.51 [PH] (ref 7.35–7.45)
PH BLDV: 7.38 [PH] (ref 7.32–7.43)
PHOSPHATE SERPL-MCNC: 3.8 MG/DL (ref 2.5–4.5)
PLAT MORPH BLD: ABNORMAL
PLATELET # BLD AUTO: 155 10E3/UL (ref 150–450)
PLATELET # BLD AUTO: 164 10E3/UL (ref 150–450)
PLATELET # BLD AUTO: 174 10E3/UL (ref 150–450)
PLATELET # BLD AUTO: 200 10E3/UL (ref 150–450)
PO2 BLD: 103 MM HG (ref 80–105)
PO2 BLD: 107 MM HG (ref 80–105)
PO2 BLD: 133 MM HG (ref 80–105)
PO2 BLD: 150 MM HG (ref 80–105)
PO2 BLD: 153 MM HG (ref 80–105)
PO2 BLD: 202 MM HG (ref 80–105)
PO2 BLDV: 24 MM HG (ref 25–47)
POLYCHROMASIA BLD QL SMEAR: ABNORMAL
POLYCHROMASIA BLD QL SMEAR: SLIGHT
POTASSIUM SERPL-SCNC: 3.6 MMOL/L (ref 3.4–5.3)
POTASSIUM SERPL-SCNC: 4.1 MMOL/L (ref 3.4–5.3)
POTASSIUM SERPL-SCNC: 4.1 MMOL/L (ref 3.4–5.3)
PR INTERVAL - MUSE: NORMAL MS
PR INTERVAL - MUSE: NORMAL MS
PROMYELOCYTES # BLD MANUAL: 0.2 10E3/UL
PROMYELOCYTES NFR BLD MANUAL: 1 %
PROT SERPL-MCNC: 5 G/DL (ref 6.4–8.3)
QRS DURATION - MUSE: 150 MS
QRS DURATION - MUSE: 180 MS
QT - MUSE: 418 MS
QT - MUSE: 484 MS
QTC - MUSE: 568 MS
QTC - MUSE: 684 MS
R AXIS - MUSE: -67 DEGREES
R AXIS - MUSE: 5 DEGREES
RBC # BLD AUTO: 2.39 10E6/UL (ref 4.4–5.9)
RBC # BLD AUTO: 2.87 10E6/UL (ref 4.4–5.9)
RBC # BLD AUTO: 2.89 10E6/UL (ref 4.4–5.9)
RBC # BLD AUTO: 2.89 10E6/UL (ref 4.4–5.9)
RBC MORPH BLD: ABNORMAL
SICKLE CELLS BLD QL SMEAR: ABNORMAL
SODIUM SERPL-SCNC: 140 MMOL/L (ref 136–145)
SODIUM SERPL-SCNC: 141 MMOL/L (ref 136–145)
SODIUM SERPL-SCNC: 141 MMOL/L (ref 136–145)
SYSTOLIC BLOOD PRESSURE - MUSE: NORMAL MMHG
SYSTOLIC BLOOD PRESSURE - MUSE: NORMAL MMHG
T AXIS - MUSE: 103 DEGREES
T AXIS - MUSE: 49 DEGREES
UFH PPP CHRO-ACNC: 0.32 IU/ML
UFH PPP CHRO-ACNC: 0.4 IU/ML
UFH PPP CHRO-ACNC: 0.42 IU/ML
UFH PPP CHRO-ACNC: <0.1 IU/ML
UNIT ABO/RH: NORMAL
UNIT NUMBER: NORMAL
UNIT STATUS: NORMAL
UNIT TYPE ISBT: 5100
VENTRICULAR RATE- MUSE: 111 BPM
VENTRICULAR RATE- MUSE: 120 BPM
WBC # BLD AUTO: 15.5 10E3/UL (ref 4–11)
WBC # BLD AUTO: 18.5 10E3/UL (ref 4–11)
WBC # BLD AUTO: 20.1 10E3/UL (ref 4–11)
WBC # BLD AUTO: 22.4 10E3/UL (ref 4–11)

## 2022-07-19 PROCEDURE — 86160 COMPLEMENT ANTIGEN: CPT | Performed by: INTERNAL MEDICINE

## 2022-07-19 PROCEDURE — 85520 HEPARIN ASSAY: CPT | Performed by: SURGERY

## 2022-07-19 PROCEDURE — 999N000253 HC STATISTIC WEANING TRIALS

## 2022-07-19 PROCEDURE — 85007 BL SMEAR W/DIFF WBC COUNT: CPT | Performed by: SURGERY

## 2022-07-19 PROCEDURE — 93005 ELECTROCARDIOGRAM TRACING: CPT

## 2022-07-19 PROCEDURE — 85384 FIBRINOGEN ACTIVITY: CPT | Performed by: SURGERY

## 2022-07-19 PROCEDURE — 85379 FIBRIN DEGRADATION QUANT: CPT | Performed by: SURGERY

## 2022-07-19 PROCEDURE — 82310 ASSAY OF CALCIUM: CPT | Performed by: SURGERY

## 2022-07-19 PROCEDURE — 94003 VENT MGMT INPAT SUBQ DAY: CPT

## 2022-07-19 PROCEDURE — 250N000013 HC RX MED GY IP 250 OP 250 PS 637: Performed by: SURGERY

## 2022-07-19 PROCEDURE — 83605 ASSAY OF LACTIC ACID: CPT | Performed by: SURGERY

## 2022-07-19 PROCEDURE — 999N000248 HC STATISTIC IV INSERT WITH US BY RN

## 2022-07-19 PROCEDURE — 250N000013 HC RX MED GY IP 250 OP 250 PS 637: Performed by: INTERNAL MEDICINE

## 2022-07-19 PROCEDURE — 71045 X-RAY EXAM CHEST 1 VIEW: CPT

## 2022-07-19 PROCEDURE — 94640 AIRWAY INHALATION TREATMENT: CPT | Mod: 76

## 2022-07-19 PROCEDURE — 250N000009 HC RX 250: Performed by: STUDENT IN AN ORGANIZED HEALTH CARE EDUCATION/TRAINING PROGRAM

## 2022-07-19 PROCEDURE — 93750 INTERROGATION VAD IN PERSON: CPT | Performed by: INTERNAL MEDICINE

## 2022-07-19 PROCEDURE — 82330 ASSAY OF CALCIUM: CPT | Performed by: SURGERY

## 2022-07-19 PROCEDURE — 82248 BILIRUBIN DIRECT: CPT | Performed by: NURSE PRACTITIONER

## 2022-07-19 PROCEDURE — 86225 DNA ANTIBODY NATIVE: CPT | Performed by: INTERNAL MEDICINE

## 2022-07-19 PROCEDURE — 250N000013 HC RX MED GY IP 250 OP 250 PS 637: Performed by: NURSE PRACTITIONER

## 2022-07-19 PROCEDURE — P9016 RBC LEUKOCYTES REDUCED: HCPCS | Performed by: SURGERY

## 2022-07-19 PROCEDURE — 85027 COMPLETE CBC AUTOMATED: CPT | Performed by: SURGERY

## 2022-07-19 PROCEDURE — 250N000013 HC RX MED GY IP 250 OP 250 PS 637: Performed by: STUDENT IN AN ORGANIZED HEALTH CARE EDUCATION/TRAINING PROGRAM

## 2022-07-19 PROCEDURE — 99291 CRITICAL CARE FIRST HOUR: CPT | Mod: 25 | Performed by: INTERNAL MEDICINE

## 2022-07-19 PROCEDURE — 84100 ASSAY OF PHOSPHORUS: CPT | Performed by: SURGERY

## 2022-07-19 PROCEDURE — 36415 COLL VENOUS BLD VENIPUNCTURE: CPT | Performed by: SURGERY

## 2022-07-19 PROCEDURE — 80053 COMPREHEN METABOLIC PANEL: CPT | Performed by: SURGERY

## 2022-07-19 PROCEDURE — 83735 ASSAY OF MAGNESIUM: CPT | Performed by: SURGERY

## 2022-07-19 PROCEDURE — 999N000015 HC STATISTIC ARTERIAL MONITORING DAILY

## 2022-07-19 PROCEDURE — 250N000013 HC RX MED GY IP 250 OP 250 PS 637: Performed by: ANESTHESIOLOGY

## 2022-07-19 PROCEDURE — P9041 ALBUMIN (HUMAN),5%, 50ML: HCPCS | Performed by: STUDENT IN AN ORGANIZED HEALTH CARE EDUCATION/TRAINING PROGRAM

## 2022-07-19 PROCEDURE — 93010 ELECTROCARDIOGRAM REPORT: CPT | Performed by: INTERNAL MEDICINE

## 2022-07-19 PROCEDURE — 250N000011 HC RX IP 250 OP 636: Performed by: ANESTHESIOLOGY

## 2022-07-19 PROCEDURE — 85610 PROTHROMBIN TIME: CPT | Performed by: SURGERY

## 2022-07-19 PROCEDURE — 200N000002 HC R&B ICU UMMC

## 2022-07-19 PROCEDURE — 250N000011 HC RX IP 250 OP 636: Performed by: STUDENT IN AN ORGANIZED HEALTH CARE EDUCATION/TRAINING PROGRAM

## 2022-07-19 PROCEDURE — 94640 AIRWAY INHALATION TREATMENT: CPT

## 2022-07-19 PROCEDURE — 82805 BLOOD GASES W/O2 SATURATION: CPT | Performed by: SURGERY

## 2022-07-19 PROCEDURE — 85347 COAGULATION TIME ACTIVATED: CPT

## 2022-07-19 PROCEDURE — 82805 BLOOD GASES W/O2 SATURATION: CPT | Performed by: THORACIC SURGERY (CARDIOTHORACIC VASCULAR SURGERY)

## 2022-07-19 PROCEDURE — 999N000157 HC STATISTIC RCP TIME EA 10 MIN

## 2022-07-19 PROCEDURE — 82374 ASSAY BLOOD CARBON DIOXIDE: CPT | Performed by: SURGERY

## 2022-07-19 PROCEDURE — 85300 ANTITHROMBIN III ACTIVITY: CPT | Performed by: SURGERY

## 2022-07-19 PROCEDURE — 83051 HEMOGLOBIN PLASMA: CPT | Performed by: SURGERY

## 2022-07-19 PROCEDURE — 258N000003 HC RX IP 258 OP 636: Performed by: STUDENT IN AN ORGANIZED HEALTH CARE EDUCATION/TRAINING PROGRAM

## 2022-07-19 PROCEDURE — 85730 THROMBOPLASTIN TIME PARTIAL: CPT | Performed by: SURGERY

## 2022-07-19 PROCEDURE — 87040 BLOOD CULTURE FOR BACTERIA: CPT | Performed by: SURGERY

## 2022-07-19 PROCEDURE — 71045 X-RAY EXAM CHEST 1 VIEW: CPT | Mod: 26 | Performed by: RADIOLOGY

## 2022-07-19 PROCEDURE — 250N000011 HC RX IP 250 OP 636: Performed by: THORACIC SURGERY (CARDIOTHORACIC VASCULAR SURGERY)

## 2022-07-19 RX ORDER — ALBUMIN, HUMAN INJ 5% 5 %
25 SOLUTION INTRAVENOUS ONCE
Status: DISCONTINUED | OUTPATIENT
Start: 2022-07-19 | End: 2022-07-19

## 2022-07-19 RX ORDER — POTASSIUM CHLORIDE 29.8 MG/ML
20 INJECTION INTRAVENOUS ONCE
Status: COMPLETED | OUTPATIENT
Start: 2022-07-19 | End: 2022-07-19

## 2022-07-19 RX ORDER — ALBUMIN, HUMAN INJ 5% 5 %
25 SOLUTION INTRAVENOUS ONCE
Status: COMPLETED | OUTPATIENT
Start: 2022-07-19 | End: 2022-07-19

## 2022-07-19 RX ADMIN — OXYCODONE HYDROCHLORIDE 10 MG: 10 TABLET ORAL at 01:13

## 2022-07-19 RX ADMIN — OXYCODONE HYDROCHLORIDE 10 MG: 10 TABLET ORAL at 23:26

## 2022-07-19 RX ADMIN — ACETAMINOPHEN 975 MG: 325 TABLET, FILM COATED ORAL at 14:00

## 2022-07-19 RX ADMIN — HEPARIN SODIUM 1500 UNITS/HR: 10000 INJECTION, SOLUTION INTRAVENOUS at 19:51

## 2022-07-19 RX ADMIN — AMIODARONE HYDROCHLORIDE 1 MG/MIN: 50 INJECTION, SOLUTION INTRAVENOUS at 02:08

## 2022-07-19 RX ADMIN — PIPERACILLIN AND TAZOBACTAM 4.5 G: 4; .5 INJECTION, POWDER, LYOPHILIZED, FOR SOLUTION INTRAVENOUS at 23:25

## 2022-07-19 RX ADMIN — INSULIN ASPART 1 UNITS: 100 INJECTION, SOLUTION INTRAVENOUS; SUBCUTANEOUS at 12:23

## 2022-07-19 RX ADMIN — FLUTICASONE PROPIONATE AND SALMETEROL XINAFOATE 2 PUFF: 115; 21 AEROSOL, METERED RESPIRATORY (INHALATION) at 19:59

## 2022-07-19 RX ADMIN — MULTIVITAMIN 15 ML: LIQUID ORAL at 08:05

## 2022-07-19 RX ADMIN — DULOXETINE 30 MG: 30 CAPSULE, DELAYED RELEASE ORAL at 08:05

## 2022-07-19 RX ADMIN — Medication 1 PACKET: at 14:01

## 2022-07-19 RX ADMIN — HYDRALAZINE HYDROCHLORIDE 25 MG: 25 TABLET, FILM COATED ORAL at 14:00

## 2022-07-19 RX ADMIN — ACETAMINOPHEN 975 MG: 325 TABLET, FILM COATED ORAL at 23:25

## 2022-07-19 RX ADMIN — OXYCODONE HYDROCHLORIDE 10 MG: 10 TABLET ORAL at 09:42

## 2022-07-19 RX ADMIN — Medication 1 PACKET: at 08:05

## 2022-07-19 RX ADMIN — QUETIAPINE FUMARATE 50 MG: 50 TABLET ORAL at 08:05

## 2022-07-19 RX ADMIN — AMIODARONE HYDROCHLORIDE 150 MG: 1.5 INJECTION, SOLUTION INTRAVENOUS at 01:37

## 2022-07-19 RX ADMIN — ALBUMIN HUMAN 25 G: 0.05 INJECTION, SOLUTION INTRAVENOUS at 23:19

## 2022-07-19 RX ADMIN — Medication 1 PACKET: at 19:51

## 2022-07-19 RX ADMIN — CALCIUM GLUCONATE 1 G: 20 INJECTION, SOLUTION INTRAVENOUS at 00:46

## 2022-07-19 RX ADMIN — CALCIUM GLUCONATE 1 G: 20 INJECTION, SOLUTION INTRAVENOUS at 10:45

## 2022-07-19 RX ADMIN — OXYCODONE HYDROCHLORIDE 10 MG: 10 TABLET ORAL at 06:01

## 2022-07-19 RX ADMIN — HYDRALAZINE HYDROCHLORIDE 25 MG: 25 TABLET, FILM COATED ORAL at 06:01

## 2022-07-19 RX ADMIN — PIPERACILLIN AND TAZOBACTAM 4.5 G: 4; .5 INJECTION, POWDER, LYOPHILIZED, FOR SOLUTION INTRAVENOUS at 17:41

## 2022-07-19 RX ADMIN — Medication 40 MG: at 08:05

## 2022-07-19 RX ADMIN — OXYCODONE HYDROCHLORIDE 10 MG: 10 TABLET ORAL at 17:42

## 2022-07-19 RX ADMIN — PIPERACILLIN AND TAZOBACTAM 4.5 G: 4; .5 INJECTION, POWDER, LYOPHILIZED, FOR SOLUTION INTRAVENOUS at 12:34

## 2022-07-19 RX ADMIN — PIPERACILLIN AND TAZOBACTAM 4.5 G: 4; .5 INJECTION, POWDER, LYOPHILIZED, FOR SOLUTION INTRAVENOUS at 06:31

## 2022-07-19 RX ADMIN — FLUTICASONE PROPIONATE AND SALMETEROL XINAFOATE 2 PUFF: 115; 21 AEROSOL, METERED RESPIRATORY (INHALATION) at 08:09

## 2022-07-19 RX ADMIN — HYDROXYCHLOROQUINE SULFATE 200 MG: 200 TABLET, FILM COATED ORAL at 19:52

## 2022-07-19 RX ADMIN — POTASSIUM CHLORIDE 20 MEQ: 29.8 INJECTION, SOLUTION INTRAVENOUS at 07:30

## 2022-07-19 RX ADMIN — DEXMEDETOMIDINE HYDROCHLORIDE 1.2 MCG/KG/HR: 4 INJECTION, SOLUTION INTRAVENOUS at 19:52

## 2022-07-19 RX ADMIN — HYDROXYCHLOROQUINE SULFATE 200 MG: 200 TABLET, FILM COATED ORAL at 08:05

## 2022-07-19 RX ADMIN — ASPIRIN 81 MG CHEWABLE TABLET 81 MG: 81 TABLET CHEWABLE at 08:05

## 2022-07-19 RX ADMIN — INSULIN GLARGINE 20 UNITS: 100 INJECTION, SOLUTION SUBCUTANEOUS at 08:27

## 2022-07-19 RX ADMIN — OXYCODONE HYDROCHLORIDE 10 MG: 10 TABLET ORAL at 14:00

## 2022-07-19 RX ADMIN — Medication 10 MG: at 19:52

## 2022-07-19 RX ADMIN — QUETIAPINE FUMARATE 100 MG: 100 TABLET ORAL at 23:26

## 2022-07-19 RX ADMIN — DEXMEDETOMIDINE HYDROCHLORIDE 1.2 MCG/KG/HR: 4 INJECTION, SOLUTION INTRAVENOUS at 01:12

## 2022-07-19 RX ADMIN — HEPARIN SODIUM 1500 UNITS/HR: 10000 INJECTION, SOLUTION INTRAVENOUS at 05:32

## 2022-07-19 RX ADMIN — ACETAMINOPHEN 975 MG: 325 TABLET, FILM COATED ORAL at 06:01

## 2022-07-19 RX ADMIN — DEXMEDETOMIDINE HYDROCHLORIDE 1 MCG/KG/HR: 4 INJECTION, SOLUTION INTRAVENOUS at 05:32

## 2022-07-19 ASSESSMENT — ACTIVITIES OF DAILY LIVING (ADL)
ADLS_ACUITY_SCORE: 38

## 2022-07-19 NOTE — PROGRESS NOTES
CV ICU PROGRESS NOTE  07/19/2022        CO-MORBIDITIES  1. Cardiogenic shock (H)  (primary encounter diagnosis)  2. Ischemic cardiomyopathy  3. Heart failure with reduced ejection fraction, NYHA class III (H)  4. Coronary artery disease involving native coronary artery of native heart without angina pectoris      ASSESSMENT Dandy Sands is a 61 year old male with a PMH of CAD s/p PCI to LAD, MI, ICM, acute on chronic heart failure, s/p CRT-D, severe MR, antiphospholipid antibody syndrome on chronic warfarin, SLE, HTN, HLD, recent hospitalization 5/16-5/26 s/p RCA, LAD stenting who underwent RVAD (29F Protek duo RIJ) LVAD placement (HeartMate 3) on 7/8/22 by Dr. Zamora. Intraoperative course complicated by IABP dysfunction and RV failure, requiring crash onto bypass / vasopressor support, NO, and protek placement. Now s/p chest closure and NO wean 7/11. Return to OR for hemopericardium (7/12) and chest re-closure 7/13.    Today's Progress:  - I/O goal: net even  - Wean off propofol gtt   - Seroquel restricted only to bedtime    Overnight Events:  - Patient did not tolerate diuresis and needed albumin bolus along with PRBCs transfusion.  - Patient had A.fib- received amiodarone bolus followed by infusion        PLAN:   Neuro/ pain/ sedation:  #Acute Postoperative pain  #Depression   #Anxiety due to a medical condition  #Insomnia  - Monitor neurological status. Notify the MD for any acute changes in exam.  - Pain: fentanyl gtt. Scheduled tylenol, scheduling oxycodone 10mg q4h. PRN tylenol, oxycodone, dilaudid. Wean fentanyl  - Sedation: precedex, propofol gtt, RASS 0 to -1. Wean propofol.  - Duloxetine 30mg  - Melatonin 10 mg HS  - Seroquel 100 mg HS  - Holding PTA meds: hydroxyzine 25mg PRN, zolpidem 5mg, melatonin 3mg, lorazepam 0.5mg  - QTc(7/18)- 683 msec, hence no haldol    Pulmonary:   #Acute hypoxic respiratory failure on iMV  #Mild-moderate emphysema  #History of COVID-19  - Off nitric 7/12  - Ventilated:  RR- 12, Tv- 450, PEEP- 5, FiO2- 35%  - Daily PST and attempt to wean    Cardiovascular:    #S/p LVAD placement (HeartMate 3) on 22   #S/p RVAD (29F Protek duo RIJ) on 22  #S/p chest closure   #Cardiogenic shock  #Advanced ischemic cardiomyopathy, class IV, stage D  #CAD s/p PCI to LAD ()  #S/p CRT-D ()  #History of MI ()  #Severe MR  #Antiphospholipid antibody syndrome on chronic warfarin  #HTN, HLD  #Recent hospitalization - s/p RCA, LAD stenting  #Atrial fibrillation   - Monitor hemodynamic status. Chest closure and oxygenator splicing . Reopened  for I&D and left open, plan for closure .  - Goal MAP > 65   - PTA meds(held): atorvastatin 40mg, clopidogrel 75mg, lasix 40mg, warfarin 5mg  - LVAD management per Advanced HF service   - flows stable, circuit functioning w/o clots/fibrin  - AC Plan: high intensity heparin gtt  - Aspirin 81 mg  - Hydralazine uriel and prn MAP >85  - Amiodarone gtt  - Formal TTE - HM3 LVAD at 5200 RPM and Protek Duo RVAD at 4.3L/min.  Normal LV size with severely reduced global LV function, LVEF<20%. LVIDd= 4.6  Cm. Normal RV size and mildly reduced RV function, RVFAC 31%. The ventricular  septum is midline. The aortic valve opens minimally with each beat. There is no AI.  LVAD inflow cannula is visualized in the LV apex. Normal Doppler interrogation  of the LVAD inflow cannula. LVAD outflow graft is not well assessed.  Organized material is present in the pericardial space, most prominently  anterior to the RV free wall. There is no evidence of pericardial tamponade.  This was also present on the 22 study, but is better imaged on today's  study.          GI / Nutrition:   #GERD  #Congestive hepatopathy in the setting of cardiac insuffiency  - NPO 2/2 intubation   - Titrate NJT to goal  - Bowel regimen (miralax / senna)  - Nutrition consulted  - Trend LFT's every alternate day    Hematemesis / oral mucosal bleedin/12 x2, 7/13 x1  (oral, around ETT/ETT clear).   - continue PPI once daily  - hgb stable   - OG without patience bloody output     Renal/ Fluid Balance:    - Strict I/O, daily weights   - Avoid/limit nephrotoxins as able  - Replete lytes PRN per protocol  - PTA meds: tamsulosin 0.4mg (held)  - Fluid goal : net even    Endocrine:    #Stress induced hyperglycemia  Preop A1c 5.5%  - Insulin lantus and sliding scale  - Goal BG <180 for optimal healing       ID/ Antibiotics:  #Periopearive antibiosis (s/p course of Cefepime, vancomycin, rifampin, fluconazole)  #Enterococcus faecalis PNA  - Discontinue Cefepime and Vancomycin (7/13-7/14), chest now closed   - Trend fever curve   - Pan culture (7/12): Bcx x2, UA/UC -> negative     -7/14 Endotracheal sputum culture (4+ candida albicans, 1+ E.Coli, 3+ enterococcus faecalis)  - 7/18 C.diff- negative  - Zosyn for HAP(7/15-7/22)    Heme:     #Acute blood loss anemia  #Acute blood loss thrombocytopenia  #History of DVT and PE   #Antiphospholipid antibody syndrome  #History of iron deficiency anemia  - Monitor for s/sx of bleeding  - s/p 3u pRBC, 2u Plt 7/12 perioperative needs  - transfused 2 units 7/15, 1 unit 7/18  - Trend CBC and coags.  - Hgb goal >8  - Plt goal > 20    Rheumatology:  #SLE  - Chronic, symptoms include arthritis, photosensitivity, fatigue, and skin manifestations  - PTA meds: hydroxychloroquine 200mg, resumed 7/16  - Rheumatology consulted and following. Follow dsDNA and complement levels. Will need cellcept restarted when appropriate.    Prophylaxis:    - DVT prophylaxis: SCD + high intensity heparin gtt   - Aspirin 81 mg  - PPI  - Bowel regimen  - PT/OT  - Left upper extremity US(7/18)- no evidence of DVT    Lines / Tubes / Drains:  - R brachial triple lumen PICC(6/17-)  - Left Axillary A-line(7/18-)  - ETT(7/8-)  - Fitzgerald(7/8-)  - NJT(7/11-)  - OGT(7/12-)  - Chest Tubes(7/8-)      Disposition:  - CVICU.    Patient seen, findings and plan discussed with CV ICU staff,   Perry Celestin MD  Surgery PGY-4    ----------------------------------------------------------------      Subjective:  Acute mentioned mentioned as above.    OBJECTIVE:   1. VITAL SIGNS:   Temp:  [88.5  F (31.4  C)-100.9  F (38.3  C)] 99.7  F (37.6  C)  Pulse:  [] 127  Resp:  [11-53] 25  BP: (78)/(63) 78/63  MAP:  [53 mmHg-106 mmHg] 106 mmHg  Arterial Line BP: ()/(46-93) 116/93  FiO2 (%):  [35 %-100 %] 80 %  SpO2:  [94 %-100 %] 94 %  Vent Mode: CMV/AC  (Continuous Mandatory Ventilation/ Assist Control)  FiO2 (%): 80 %  Resp Rate (Set): 12 breaths/min  Tidal Volume (Set, mL): 450 mL  PEEP (cm H2O): 5 cmH2O  Pressure Support (cm H2O): 7 cmH2O  Resp: 25      2. INTAKE/ OUTPUT:   I/O last 3 completed shifts:  In: 3523.8 [I.V.:1368.8; NG/GT:425]  Out: 4205 [Urine:3505; Emesis/NG output:250; Stool:100; Chest Tube:350]    3. PHYSICAL EXAMINATION:   General: critically ill  Neuro: sedated lightly, moves extremities but doesn't follow commands  Resp: ventilated  CV: rate controlled with amiodarone  Abdomen: Soft, Non-distended, Non-tender  Incisions: c/d/i  Extremities: warm and well perfused    4. INVESTIGATIONS:   Arterial Blood Gases   Recent Labs   Lab 07/19/22  0323 07/19/22  0042 07/18/22 1953 07/18/22  1528   PH 7.51* 7.49* 7.51* 7.49*   PCO2 35 37 34* 34*   PO2 107* 133* 131* 112*   HCO3 28 28 27 26     Complete Blood Count   Recent Labs   Lab 07/19/22  0322 07/18/22  2118 07/18/22  1526 07/18/22  0954   WBC 15.5* 19.0* 16.7* 15.1*   HGB 7.0* 7.6* 7.7* 7.8*    179 171 159     Basic Metabolic Panel  Recent Labs   Lab 07/19/22  0331 07/19/22  0322 07/19/22  0025 07/18/22  2118 07/18/22 2003 07/18/22  1526 07/18/22  1147 07/18/22  0954   NA  --  141  --  141  --  141  --  140   POTASSIUM  --  3.6  --  3.9  --  3.7  --  3.8   CHLORIDE  --  106  --  106  --  106  --  107   CO2  --  27  --  25  --  25  --  23   BUN  --  24.2*  --  24.5*  --  25.7*  --  25.9*   CR  --  0.65*  --  0.66*  --  0.64*   --  0.61*   * 114* 100* 127*   < > 120*   < > 167*    < > = values in this interval not displayed.     Liver Function Tests  Recent Labs   Lab 07/19/22 0322 07/18/22 2118 07/18/22  1526 07/18/22  0954 07/18/22  0422 07/17/22  1006 07/17/22  0408   AST 24  --  23  --   --   --   --    ALT 11  --  12  --  13  --  13   ALKPHOS 146*  --  187*  --   --   --   --    BILITOTAL 0.6  --  0.7  --   --   --   --    ALBUMIN 2.5*  --  2.3*  --  2.6*  --  2.6*   INR 1.35* 1.33* 1.31* 1.22* 1.23*   < > 1.11    < > = values in this interval not displayed.     Pancreatic Enzymes  No lab results found in last 7 days.  Coagulation Profile  Recent Labs   Lab 07/19/22 0322 07/18/22 2118 07/18/22  1526 07/18/22  0954   INR 1.35* 1.33* 1.31* 1.22*   * 158* 102* 114*         5. RADIOLOGY:   Recent Results (from the past 24 hour(s))   US Upper Extremity Venous Duplex Left Portable    Narrative    EXAMINATION: DOPPLER VENOUS ULTRASOUND OF THE LEFT UPPER EXTREMITY,  7/18/2022 11:04 AM     COMPARISON: 5/20/2022    HISTORY: Edema.  Recent LVAD and RVAD placement 7/8/2022 given history  of cardiogenic shock, advanced ischemic cardiomyopathy.      TECHNIQUE:  Gray-scale evaluation with compression, spectral flow and  color Doppler assessment of the deep venous system of the left upper  extremity.    FINDINGS:  Left: Normal blood flow and waveforms are demonstrated in the internal  jugular, innominate, subclavian, and axillary veins. There is normal  compressibility of the visualized brachial and basilic mid arm.  Left  upper extremity bandage mid upper arm obscures brachial and basilic  veins at this point.  Patent left cephalic vein; incidentally noted  left cephalic vein with thick walls.  Pulsatile venous waveforms  noted.    Nonspecific mild subcutaneous edema in the left upper extremity.      Impression    IMPRESSION:  No evidence of left upper extremity deep venous  thrombosis.    I have personally reviewed the examination  and initial interpretation  and I agree with the findings.    JOSE M OSHEA MD         SYSTEM ID:  [id number]   XR Chest Port 1 View    Impression    RESIDENT PRELIMINARY INTERPRETATION  IMPRESSION:   1. Supportive devices in similar positioning.  2. Improved lung volumes with similar diffuse mixed pulmonary  opacities.       =========================================

## 2022-07-19 NOTE — PLAN OF CARE
Goal Outcome Evaluation:    Plan of Care Reviewed With: patient     Overall Patient Progress: improving    Outcome Evaluation: TFs meeting 100% of nutrition needs

## 2022-07-19 NOTE — CONFIDENTIAL NOTE
PATHOLOGY HLA CROSSMATCH CONSULTATION: DONOR/RECIPIENT VIRTUAL CROSSMATCH - Heart  Consultation Date: 2020  Consultation Requested by: Dr. Lora  Regarding: Compatibility of  donor organ UNOS #[id number]  from OPO: MNOP with patient Dandy Sands       Findings: Regarding a virtual crossmatch between Dandy Sands and  donor listed above (match ID [id number]):  The most recent and 3 additional patient sera were analyzed.  The patient has donor-specific antibodies  (DSA) as listed in table below.    Recent DSA (2022):   Moderate A2  Moderate C12    Recommend running a prospective FXM.     Record Review Indicates: I personally reviewed the most recent serum and the peak sera. The patient has antibodies against the donor organ.     Disclaimer: Clinical judgement must take into account other factors, such as non-HLA antibodies not detected in the assay.   The VXM gives probabilities only.  The probability does not account for the potential for auto-antibodies that may be present in the patient's serum.  These autoantibodies may render the physical crossmatch falsely positive, and would be detected by an autologous crossmatch.  When possible, confirm findings with a prospective allogeneic and autologous flow crossmatches before going to transplant.    This virtual crossmatch took 30 minutes for data collection, analysis and charting time      Carlos Mendoza, PhD, DABCC, A([facility])  Immunology/Histocompatibility Laboratory  Pager: [phone]

## 2022-07-19 NOTE — PROGRESS NOTES
[facility]    Cardiology Progress Note- Cards 2        Date of Admission:  6/17/2022     Assessment & Plan: HVSL   Dandy EDUARD Sands is a 61 year old male with PMH of lupus, antiphospholipid syndrome, HTN, HLD, HFrEF 2/2 ICM who presented with cardiogenic shock c/b multiorgan failure (JOSE, shock liver) requiring mechanical support with IABP, now s/p HM3 LVAD 7/8/22 by Dr. Zamora with intraoperative course c/b RV failure s/p 29F Protek duo via RIJ. Post op course c/b hemopericardium s/p repeat washout with chest left open. Chest now closed 7/13/22     #HFrEF 2/2 ICM s/p HM3 LVAD in setting of cardiogenic shock (SCAI D)  #RV failure s/p Protek duo temporary RVAD  #Post chest closure hemopericardium s/p repeat washout  Patient with ICM s/p HM3 LVAD 7/8/22 by Dr. Zamora in setting of presentation for cardiogenic shock requiring MCS with IABP as prior to HM (initially evaluated for heart transplant, however noted to have substantially elevated DSAs during course of care, so decision made to proceed with LVAD given patient already on temporary MCS). Intraoperative course c/b RV failure resulting in cardiogenic shock requiring high dose pressors necessitating RVAD support. Chest closed 7/11 and Ashli weaned. However developed tachycardia, lactic acidosis and decreased hemoglobin with CT scan noting hemopericardium. Returned to OR where underwent washout with large clot burden noted over the right atrium and around LVAD outflow graft. Chest left open and returned to ICU where pressors were able to be weaned. Noted to have stable RVAD/LVAD flows throughout and post procedurally. Ultimately returned to OR for repeat closure. Currently hemodynamically stable with stable end-organ function and hemoglobin. Currently attempting PSTs but with difficulty with mental status. Weaning RVAD support   -LVAD: continues to have good flows with few alarms with rare PI event alarms; will  continue at current speed and monitor for alarms  -RVAD: no evidence of increasing hemolysis, flows appear balanced, oxygenating well --> Decrease RVAD to 1.8L flow   -AC, antiplatelets: per surgical service; has had some post-operative bleeding and prior hemopericardium; will need to monitor coags closely in setting of heparin and ASA use  -Volume status: appears euvolemic; will monitor closely to ensure not add'l volume accumulation  -rhythm: noted for atrial fibrillation overnight and started on amiodarone;    -of note, patient with climbing capture threshold of RV lead; will need to be evaluated in future by EP team  -hypertension: continue hydralazine 25 mg q8h      The patient's care was discussed with the Attending Physician, Dr. Lora, Bedside Nurse and Primary team.    Emelyn Meneses MD  [facility]    ______________________________________________________________________    Interval History   Nursing notes reviewed. Noted for afib overnight. This AM, appears to be intermittently following commands. Otherwise no acute concerns    Data reviewed today: I reviewed all medications, new labs and imaging results over the last 24 hours    Physical Exam   Vital Signs: Temp: 99.7  F (37.6  C) Temp src: Esophageal BP: (!) 78/63 Pulse: (!) 127   Resp: 25 SpO2: 94 % O2 Device: Mechanical Ventilator    Weight: 179 lbs 3.74 oz  General Appearance: Intubated, sedated male in ICU bed  Respiratory: ventilated lung sounds, clear anterior breath sounds  Cardiovascular: vad hum, driveline site c/d/i, protek in place, chest incisions c/d/i, JVD ~12 cm  GI: soft, bs active   Skin: warm, well perfused, 1+ pitting edema to the mid-tibia  Neuro: sedated, intubated    Data   Recent Labs   Lab 07/19/22  0331 07/19/22  0322 07/19/22  0025 07/18/22  2118 07/18/22  2003 07/18/22  1526   WBC  --  15.5*  --  19.0*  --  16.7*   HGB  --  7.0*  --  7.6*  --  7.7*   MCV  --  92  --  91  --   92   PLT  --  155  --  179  --  171   INR  --  1.35*  --  1.33*  --  1.31*   NA  --  141  --  141  --  141   POTASSIUM  --  3.6  --  3.9  --  3.7   CHLORIDE  --  106  --  106  --  106   CO2  --  27  --  25  --  25   BUN  --  24.2*  --  24.5*  --  25.7*   CR  --  0.65*  --  0.66*  --  0.64*   ANIONGAP  --  8  --  10  --  10   ELDA  --  8.1*  --  7.9*  --  7.7*   * 114* 100* 127*   < > 120*   ALBUMIN  --  2.5*  --   --   --  2.3*   PROTTOTAL  --  5.0*  --   --   --  5.2*   BILITOTAL  --  0.6  --   --   --  0.7   ALKPHOS  --  146*  --   --   --  187*   ALT  --  11  --   --   --  12   AST  --  24  --   --   --  23    < > = values in this interval not displayed.       Medications     amiodarone 1 mg/min (07/19/22 0500)     amiodarone       dexmedetomidine 1.2 mcg/kg/hr (07/19/22 0606)     dextrose       fentaNYL 25 mcg/hr (07/19/22 0600)     heparin 1,500 Units/hr (07/19/22 0600)     propofol (DIPRIVAN) infusion 10 mcg/kg/min (07/19/22 0622)       acetaminophen  975 mg Oral or Feeding Tube Q8H [facility]     aspirin  81 mg Oral or Feeding Tube Daily     DULoxetine  30 mg Oral Daily     fiber modular (NUTRISOURCE FIBER)  1 packet Per Feeding Tube BID     fluticasone-salmeterol  2 puff Inhalation BID     hydrALAZINE  25 mg Oral or Feeding Tube Q8H [facility]     hydroxychloroquine  200 mg Oral BID     insulin aspart  1-12 Units Subcutaneous Q4H     insulin glargine  20 Units Subcutaneous QAM AC     melatonin  10 mg Oral QPM     multivitamins w/minerals  15 mL Per Feeding Tube Daily     oxyCODONE  10 mg Oral or Feeding Tube Q4H     pantoprazole  40 mg Per Feeding Tube QAM AC     piperacillin-tazobactam  4.5 g Intravenous Q6H     polyethylene glycol  17 g Oral or Feeding Tube Daily     potassium chloride  20 mEq Intravenous Once     protein modular  1 packet Per Feeding Tube TID     QUEtiapine  100 mg Oral or Feeding Tube At Bedtime     QUEtiapine  50 mg Oral or Feeding Tube BID     senna-docusate  1 tablet Oral or Feeding Tube BID      sodium chloride (PF)  3 mL Intracatheter Q8H     sodium chloride (PF)  3 mL Intracatheter Q8H       TTE 7/17/2022:  HM3 LVAD at 5200 RPM and Protek Duo RVAD at 4.3L/min.  Normal LV size with severely reduced global LV function, LVEF<20%. LVIDd= 4.6  cm.  Normal RV size and mildly reduced RV function, RVFAC 31%. The ventricular  septum is midline.  The aortic valve opens minimally with each beat. There is no AI.  LVAD inflow cannula is visualized in the LV apex. Normal Doppler interrogation  of the LVAD inflow cannula.  LVAD outflow graft is not well assessed.  Organized material is present in the pericardial space, most prominently  anterior to the RV free wall. There is no evidence of pericardial tamponade.  This was also present on the 7/12/22 study, but is better imaged on today's  study.     This study was compared with the study from 7/12/22: Image quality is  significantly improved. There has otherwise been no significant change,  however the prior study was extremely limited.

## 2022-07-19 NOTE — PROGRESS NOTES
ECMO Shift Summary: VAD shift summary      ECMO Equipment:  [id number]    Primary console serial number: [id number]  Backup console serial number: [id number]  Circuit lot number: [id number]  Pump head lot number: [id number]      Patient remains on RVAD via Centrimag, all equipment is functioning and alarms are appropriately set. RPM's 2800 with flow range 2.0-2.2 L/min. Circuit remains free of air. Fibrin collecting at connectors Cannulas are secure with no bleeding from sites. Extremities are pale and cool. Backup console on, emergency change out supplies at bedside (including spare pump head, sterile scissors, 2 connectors).        Significant Shift Events: None    Vent settings:  Vent Mode: CMV/AC  (Continuous Mandatory Ventilation/ Assist Control)  FiO2 (%): 35 %  Resp Rate (Set): 12 breaths/min  Tidal Volume (Set, mL): 450 mL  PEEP (cm H2O): 5 cmH2O  Pressure Support (cm H2O): 7 cmH2O  Resp: 21  .        Urine output is low, blood loss was none. Product given included PRBC.       Intake/Output Summary (Last 24 hours) at 7/19/2022 0449  Last data filed at 7/19/2022 0400  Gross per 24 hour   Intake 3833.02 ml   Output 4645 ml   Net -811.98 ml       ECHO:  No results found for this or any previous visit.  No results found for this or any previous visit.      CXR:  Recent Results (from the past 24 hour(s))   US Upper Extremity Venous Duplex Left Portable    Narrative    EXAMINATION: DOPPLER VENOUS ULTRASOUND OF THE LEFT UPPER EXTREMITY,  7/18/2022 11:04 AM     COMPARISON: 5/20/2022    HISTORY: Edema.  Recent LVAD and RVAD placement 7/8/2022 given history  of cardiogenic shock, advanced ischemic cardiomyopathy.      TECHNIQUE:  Gray-scale evaluation with compression, spectral flow and  color Doppler assessment of the deep venous system of the left upper  extremity.    FINDINGS:  Left: Normal blood flow and waveforms are demonstrated in the internal  jugular, innominate, subclavian, and axillary veins. There is  normal  compressibility of the visualized brachial and basilic mid arm.  Left  upper extremity bandage mid upper arm obscures brachial and basilic  veins at this point.  Patent left cephalic vein; incidentally noted  left cephalic vein with thick walls.  Pulsatile venous waveforms  noted.    Nonspecific mild subcutaneous edema in the left upper extremity.      Impression    IMPRESSION:  No evidence of left upper extremity deep venous  thrombosis.    I have personally reviewed the examination and initial interpretation  and I agree with the findings.    JOSE M OSHEA MD         SYSTEM ID:  [id number]   XR Chest Port 1 View    Impression    RESIDENT PRELIMINARY INTERPRETATION  IMPRESSION:   1. Supportive devices in similar positioning.  2. Improved lung volumes with similar diffuse mixed pulmonary  opacities.       Labs:  Recent Labs   Lab 07/19/22  0323 07/19/22  0042 07/18/22  1953 07/18/22  1528   PH 7.51* 7.49* 7.51* 7.49*   PCO2 35 37 34* 34*   PO2 107* 133* 131* 112*   HCO3 28 28 27 26   O2PER 35 35 35 35       Lab Results   Component Value Date    HGB 7.0 (L) 07/19/2022    PHGB 50 (H) 07/18/2022     07/19/2022    FIBR 493 (H) 07/19/2022    INR 1.35 (H) 07/19/2022     (HH) 07/19/2022    DD 4.28 (H) 07/19/2022    ANTCH 97 07/18/2022         Plan is Continue withRVAD support.      Anne Gross, RT  ECMO Specialist  7/19/2022 4:49 AM

## 2022-07-19 NOTE — PROGRESS NOTES
Major Shift Events:    Unable to follow commands. Pupils reactive and extremities move equal bilaterally. Afebrile. Pt on/off propofol for behavioral /HD Impact. Converted to Afib last night just after midnight. CVTS started amio plus maintenance. SVO2 dropped to 20's-low 30's on Protec. Fio2 increased to 100% and RBCs given as well with Positive effect. MAP goal >65. Faint but Dopplerable pulses (Monitor for changes). Vent settings remain same except for FIo2 for SVO2. GI tubes remain the same. TF settings remain the same. Fitzgerald remains in place. 250 of albumin given last night as well.      Plan:     Continue POC.    For vital signs and complete assessments, please see documentation flowsheets.

## 2022-07-20 ENCOUNTER — APPOINTMENT (OUTPATIENT)
Dept: GENERAL RADIOLOGY | Facility: CLINIC | Age: 62
DRG: 001 | End: 2022-07-20
Attending: INTERNAL MEDICINE
Payer: COMMERCIAL

## 2022-07-20 ENCOUNTER — APPOINTMENT (OUTPATIENT)
Dept: GENERAL RADIOLOGY | Facility: CLINIC | Age: 62
DRG: 001 | End: 2022-07-20
Attending: STUDENT IN AN ORGANIZED HEALTH CARE EDUCATION/TRAINING PROGRAM
Payer: COMMERCIAL

## 2022-07-20 LAB
ALBUMIN SERPL BCG-MCNC: 2.7 G/DL (ref 3.5–5.2)
ANION GAP SERPL CALCULATED.3IONS-SCNC: 11 MMOL/L (ref 7–15)
ANION GAP SERPL CALCULATED.3IONS-SCNC: 12 MMOL/L (ref 7–15)
ANION GAP SERPL CALCULATED.3IONS-SCNC: 8 MMOL/L (ref 7–15)
APTT PPP: 164 SECONDS (ref 22–38)
APTT PPP: 166 SECONDS (ref 22–38)
APTT PPP: 168 SECONDS (ref 22–38)
APTT PPP: 214 SECONDS (ref 22–38)
AT III ACT/NOR PPP CHRO: 83 % (ref 85–135)
BACTERIA BLD CULT: NO GROWTH
BASE EXCESS BLDA CALC-SCNC: -0.1 MMOL/L (ref -9–1.8)
BASE EXCESS BLDA CALC-SCNC: -0.9 MMOL/L (ref -9–1.8)
BASE EXCESS BLDA CALC-SCNC: 0 MMOL/L (ref -9–1.8)
BASE EXCESS BLDA CALC-SCNC: 0.2 MMOL/L (ref -9–1.8)
BASE EXCESS BLDA CALC-SCNC: 0.9 MMOL/L (ref -9–1.8)
BASE EXCESS BLDA CALC-SCNC: 1.2 MMOL/L (ref -9–1.8)
BASE EXCESS BLDA CALC-SCNC: 1.6 MMOL/L (ref -9–1.8)
BASE EXCESS BLDA CALC-SCNC: 2.1 MMOL/L (ref -9–1.8)
BASE EXCESS BLDA CALC-SCNC: 2.4 MMOL/L (ref -9–1.8)
BASE EXCESS BLDA CALC-SCNC: 2.5 MMOL/L (ref -9–1.8)
BASE EXCESS BLDV CALC-SCNC: 1.4 MMOL/L (ref -7.7–1.9)
BASOPHILS # BLD AUTO: 0.1 10E3/UL (ref 0–0.2)
BASOPHILS # BLD MANUAL: 0 10E3/UL (ref 0–0.2)
BASOPHILS # BLD MANUAL: 0.2 10E3/UL (ref 0–0.2)
BASOPHILS NFR BLD AUTO: 0 %
BASOPHILS NFR BLD MANUAL: 0 %
BASOPHILS NFR BLD MANUAL: 1 %
BLD PROD TYP BPU: NORMAL
BLOOD COMPONENT TYPE: NORMAL
BUN SERPL-MCNC: 25.6 MG/DL (ref 8–23)
BUN SERPL-MCNC: 27 MG/DL (ref 8–23)
BUN SERPL-MCNC: 27 MG/DL (ref 8–23)
BUN SERPL-MCNC: 27.9 MG/DL (ref 8–23)
BUN SERPL-MCNC: 30.4 MG/DL (ref 8–23)
BURR CELLS BLD QL SMEAR: SLIGHT
C3 SERPL-MCNC: 113 MG/DL (ref 81–157)
C4 SERPL-MCNC: 28 MG/DL (ref 13–39)
CA-I BLD-MCNC: 4.3 MG/DL (ref 4.4–5.2)
CA-I BLD-MCNC: 4.4 MG/DL (ref 4.4–5.2)
CA-I BLD-MCNC: 4.5 MG/DL (ref 4.4–5.2)
CALCIUM SERPL-MCNC: 8 MG/DL (ref 8.8–10.2)
CALCIUM SERPL-MCNC: 8.1 MG/DL (ref 8.8–10.2)
CALCIUM SERPL-MCNC: 8.1 MG/DL (ref 8.8–10.2)
CALCIUM SERPL-MCNC: 8.3 MG/DL (ref 8.8–10.2)
CALCIUM SERPL-MCNC: 8.4 MG/DL (ref 8.8–10.2)
CHLORIDE SERPL-SCNC: 105 MMOL/L (ref 98–107)
CHLORIDE SERPL-SCNC: 106 MMOL/L (ref 98–107)
CHLORIDE SERPL-SCNC: 107 MMOL/L (ref 98–107)
CODING SYSTEM: NORMAL
CREAT SERPL-MCNC: 0.66 MG/DL (ref 0.67–1.17)
CREAT SERPL-MCNC: 0.67 MG/DL (ref 0.67–1.17)
CREAT SERPL-MCNC: 0.69 MG/DL (ref 0.67–1.17)
CREAT SERPL-MCNC: 0.76 MG/DL (ref 0.67–1.17)
CREAT SERPL-MCNC: 0.76 MG/DL (ref 0.67–1.17)
CROSSMATCH: NORMAL
D DIMER PPP FEU-MCNC: 4.64 UG/ML FEU (ref 0–0.5)
D DIMER PPP FEU-MCNC: 7.29 UG/ML FEU (ref 0–0.5)
DEPRECATED HCO3 PLAS-SCNC: 21 MMOL/L (ref 22–29)
DEPRECATED HCO3 PLAS-SCNC: 22 MMOL/L (ref 22–29)
DEPRECATED HCO3 PLAS-SCNC: 22 MMOL/L (ref 22–29)
DEPRECATED HCO3 PLAS-SCNC: 24 MMOL/L (ref 22–29)
DEPRECATED HCO3 PLAS-SCNC: 25 MMOL/L (ref 22–29)
DSDNA AB SER-ACNC: 13 IU/ML
ELLIPTOCYTES BLD QL SMEAR: SLIGHT
EOSINOPHIL # BLD AUTO: 0.3 10E3/UL (ref 0–0.7)
EOSINOPHIL # BLD MANUAL: 0.1 10E3/UL (ref 0–0.7)
EOSINOPHIL # BLD MANUAL: 0.5 10E3/UL (ref 0–0.7)
EOSINOPHIL NFR BLD AUTO: 1 %
EOSINOPHIL NFR BLD MANUAL: 1 %
EOSINOPHIL NFR BLD MANUAL: 3 %
ERYTHROCYTE [DISTWIDTH] IN BLOOD BY AUTOMATED COUNT: 19.5 % (ref 10–15)
ERYTHROCYTE [DISTWIDTH] IN BLOOD BY AUTOMATED COUNT: 19.7 % (ref 10–15)
ERYTHROCYTE [DISTWIDTH] IN BLOOD BY AUTOMATED COUNT: 19.8 % (ref 10–15)
FIBRINOGEN PPP-MCNC: 476 MG/DL (ref 170–490)
FIBRINOGEN PPP-MCNC: 498 MG/DL (ref 170–490)
GFR SERPL CREATININE-BSD FRML MDRD: >90 ML/MIN/1.73M2
GLUCOSE BLDC GLUCOMTR-MCNC: 100 MG/DL (ref 70–99)
GLUCOSE BLDC GLUCOMTR-MCNC: 111 MG/DL (ref 70–99)
GLUCOSE BLDC GLUCOMTR-MCNC: 123 MG/DL (ref 70–99)
GLUCOSE BLDC GLUCOMTR-MCNC: 126 MG/DL (ref 70–99)
GLUCOSE BLDC GLUCOMTR-MCNC: 127 MG/DL (ref 70–99)
GLUCOSE BLDC GLUCOMTR-MCNC: 130 MG/DL (ref 70–99)
GLUCOSE BLDC GLUCOMTR-MCNC: 132 MG/DL (ref 70–99)
GLUCOSE SERPL-MCNC: 125 MG/DL (ref 70–99)
GLUCOSE SERPL-MCNC: 133 MG/DL (ref 70–99)
GLUCOSE SERPL-MCNC: 139 MG/DL (ref 70–99)
GLUCOSE SERPL-MCNC: 151 MG/DL (ref 70–99)
GLUCOSE SERPL-MCNC: 155 MG/DL (ref 70–99)
HCO3 BLD-SCNC: 23 MMOL/L (ref 21–28)
HCO3 BLD-SCNC: 23 MMOL/L (ref 21–28)
HCO3 BLD-SCNC: 24 MMOL/L (ref 21–28)
HCO3 BLD-SCNC: 25 MMOL/L (ref 21–28)
HCO3 BLD-SCNC: 26 MMOL/L (ref 21–28)
HCO3 BLDV-SCNC: 26 MMOL/L (ref 21–28)
HCT VFR BLD AUTO: 25.4 % (ref 40–53)
HCT VFR BLD AUTO: 27 % (ref 40–53)
HCT VFR BLD AUTO: 27.4 % (ref 40–53)
HGB BLD-MCNC: 8.1 G/DL (ref 13.3–17.7)
HGB BLD-MCNC: 8.5 G/DL (ref 13.3–17.7)
HGB BLD-MCNC: 8.7 G/DL (ref 13.3–17.7)
HGB BLD-MCNC: 8.7 G/DL (ref 13.3–17.7)
HGB FREE PLAS-MCNC: <30 MG/DL
IMM GRANULOCYTES # BLD: 0.9 10E3/UL
IMM GRANULOCYTES NFR BLD: 4 %
INR PPP: 1.29 (ref 0.85–1.15)
INR PPP: 1.34 (ref 0.85–1.15)
INR PPP: 1.35 (ref 0.85–1.15)
INR PPP: 1.35 (ref 0.85–1.15)
ISSUE DATE AND TIME: NORMAL
LACTATE SERPL-SCNC: 1.3 MMOL/L (ref 0.7–2)
LACTATE SERPL-SCNC: 1.4 MMOL/L (ref 0.7–2)
LACTATE SERPL-SCNC: 1.7 MMOL/L (ref 0.7–2)
LACTATE SERPL-SCNC: 3.2 MMOL/L (ref 0.7–2)
LYMPHOCYTES # BLD AUTO: 1 10E3/UL (ref 0.8–5.3)
LYMPHOCYTES # BLD MANUAL: 0.2 10E3/UL (ref 0.8–5.3)
LYMPHOCYTES # BLD MANUAL: 1 10E3/UL (ref 0.8–5.3)
LYMPHOCYTES NFR BLD AUTO: 5 %
LYMPHOCYTES NFR BLD MANUAL: 1 %
LYMPHOCYTES NFR BLD MANUAL: 7 %
MAGNESIUM SERPL-MCNC: 2.1 MG/DL (ref 1.7–2.3)
MCH RBC QN AUTO: 28.4 PG (ref 26.5–33)
MCH RBC QN AUTO: 28.6 PG (ref 26.5–33)
MCH RBC QN AUTO: 28.7 PG (ref 26.5–33)
MCHC RBC AUTO-ENTMCNC: 31.5 G/DL (ref 31.5–36.5)
MCHC RBC AUTO-ENTMCNC: 31.8 G/DL (ref 31.5–36.5)
MCHC RBC AUTO-ENTMCNC: 31.9 G/DL (ref 31.5–36.5)
MCV RBC AUTO: 90 FL (ref 78–100)
METAMYELOCYTES # BLD MANUAL: 0.1 10E3/UL
METAMYELOCYTES NFR BLD MANUAL: 1 %
MONOCYTES # BLD AUTO: 1.3 10E3/UL (ref 0–1.3)
MONOCYTES # BLD MANUAL: 0.4 10E3/UL (ref 0–1.3)
MONOCYTES # BLD MANUAL: 0.5 10E3/UL (ref 0–1.3)
MONOCYTES NFR BLD AUTO: 6 %
MONOCYTES NFR BLD MANUAL: 3 %
MONOCYTES NFR BLD MANUAL: 3 %
MYELOCYTES # BLD MANUAL: 0.4 10E3/UL
MYELOCYTES NFR BLD MANUAL: 3 %
NEUTROPHILS # BLD AUTO: 19.4 10E3/UL (ref 1.6–8.3)
NEUTROPHILS # BLD MANUAL: 11.8 10E3/UL (ref 1.6–8.3)
NEUTROPHILS # BLD MANUAL: 14.6 10E3/UL (ref 1.6–8.3)
NEUTROPHILS NFR BLD AUTO: 84 %
NEUTROPHILS NFR BLD MANUAL: 85 %
NEUTROPHILS NFR BLD MANUAL: 92 %
NRBC # BLD AUTO: 0.1 10E3/UL
NRBC # BLD AUTO: 0.1 10E3/UL
NRBC BLD AUTO-RTO: 0 /100
NRBC BLD MANUAL-RTO: 1 %
O2/TOTAL GAS SETTING VFR VENT: 100 %
O2/TOTAL GAS SETTING VFR VENT: 35 %
O2/TOTAL GAS SETTING VFR VENT: 50 %
OXYHGB MFR BLD: 97 % (ref 92–100)
OXYHGB MFR BLD: 98 % (ref 92–100)
OXYHGB MFR BLDV: 56 % (ref 70–75)
PCO2 BLD: 32 MM HG (ref 35–45)
PCO2 BLD: 33 MM HG (ref 35–45)
PCO2 BLD: 33 MM HG (ref 35–45)
PCO2 BLD: 34 MM HG (ref 35–45)
PCO2 BLD: 34 MM HG (ref 35–45)
PCO2 BLD: 35 MM HG (ref 35–45)
PCO2 BLD: 35 MM HG (ref 35–45)
PCO2 BLD: 36 MM HG (ref 35–45)
PCO2 BLD: 36 MM HG (ref 35–45)
PCO2 BLD: 37 MM HG (ref 35–45)
PCO2 BLDV: 43 MM HG (ref 40–50)
PH BLD: 7.44 [PH] (ref 7.35–7.45)
PH BLD: 7.45 [PH] (ref 7.35–7.45)
PH BLD: 7.46 [PH] (ref 7.35–7.45)
PH BLD: 7.46 [PH] (ref 7.35–7.45)
PH BLD: 7.47 [PH] (ref 7.35–7.45)
PH BLD: 7.47 [PH] (ref 7.35–7.45)
PH BLD: 7.48 [PH] (ref 7.35–7.45)
PH BLD: 7.49 [PH] (ref 7.35–7.45)
PH BLDV: 7.4 [PH] (ref 7.32–7.43)
PHOSPHATE SERPL-MCNC: 3.9 MG/DL (ref 2.5–4.5)
PLAT MORPH BLD: ABNORMAL
PLAT MORPH BLD: ABNORMAL
PLATELET # BLD AUTO: 150 10E3/UL (ref 150–450)
PLATELET # BLD AUTO: 157 10E3/UL (ref 150–450)
PLATELET # BLD AUTO: 201 10E3/UL (ref 150–450)
PO2 BLD: 113 MM HG (ref 80–105)
PO2 BLD: 116 MM HG (ref 80–105)
PO2 BLD: 122 MM HG (ref 80–105)
PO2 BLD: 123 MM HG (ref 80–105)
PO2 BLD: 136 MM HG (ref 80–105)
PO2 BLD: 146 MM HG (ref 80–105)
PO2 BLD: 156 MM HG (ref 80–105)
PO2 BLD: 178 MM HG (ref 80–105)
PO2 BLD: 254 MM HG (ref 80–105)
PO2 BLD: 97 MM HG (ref 80–105)
PO2 BLDV: 32 MM HG (ref 25–47)
POLYCHROMASIA BLD QL SMEAR: SLIGHT
POTASSIUM SERPL-SCNC: 3.8 MMOL/L (ref 3.4–5.3)
POTASSIUM SERPL-SCNC: 3.9 MMOL/L (ref 3.4–5.3)
POTASSIUM SERPL-SCNC: 4.2 MMOL/L (ref 3.4–5.3)
POTASSIUM SERPL-SCNC: 4.5 MMOL/L (ref 3.4–5.3)
POTASSIUM SERPL-SCNC: 4.8 MMOL/L (ref 3.4–5.3)
RBC # BLD AUTO: 2.83 10E6/UL (ref 4.4–5.9)
RBC # BLD AUTO: 2.99 10E6/UL (ref 4.4–5.9)
RBC # BLD AUTO: 3.03 10E6/UL (ref 4.4–5.9)
RBC MORPH BLD: ABNORMAL
RBC MORPH BLD: ABNORMAL
SODIUM SERPL-SCNC: 139 MMOL/L (ref 136–145)
SODIUM SERPL-SCNC: 140 MMOL/L (ref 136–145)
UFH PPP CHRO-ACNC: 0.37 IU/ML
UFH PPP CHRO-ACNC: 0.38 IU/ML
UFH PPP CHRO-ACNC: 0.41 IU/ML
UFH PPP CHRO-ACNC: 0.41 IU/ML
UNIT ABO/RH: NORMAL
UNIT NUMBER: NORMAL
UNIT STATUS: NORMAL
UNIT TYPE ISBT: 5100
WBC # BLD AUTO: 13.9 10E3/UL (ref 4–11)
WBC # BLD AUTO: 15.9 10E3/UL (ref 4–11)
WBC # BLD AUTO: 23 10E3/UL (ref 4–11)

## 2022-07-20 PROCEDURE — 82310 ASSAY OF CALCIUM: CPT | Performed by: SURGERY

## 2022-07-20 PROCEDURE — 250N000013 HC RX MED GY IP 250 OP 250 PS 637: Performed by: INTERNAL MEDICINE

## 2022-07-20 PROCEDURE — 85730 THROMBOPLASTIN TIME PARTIAL: CPT | Performed by: SURGERY

## 2022-07-20 PROCEDURE — 71045 X-RAY EXAM CHEST 1 VIEW: CPT

## 2022-07-20 PROCEDURE — 250N000013 HC RX MED GY IP 250 OP 250 PS 637: Performed by: SURGERY

## 2022-07-20 PROCEDURE — 250N000013 HC RX MED GY IP 250 OP 250 PS 637: Performed by: STUDENT IN AN ORGANIZED HEALTH CARE EDUCATION/TRAINING PROGRAM

## 2022-07-20 PROCEDURE — 36415 COLL VENOUS BLD VENIPUNCTURE: CPT | Performed by: SURGERY

## 2022-07-20 PROCEDURE — 250N000011 HC RX IP 250 OP 636: Performed by: THORACIC SURGERY (CARDIOTHORACIC VASCULAR SURGERY)

## 2022-07-20 PROCEDURE — P9016 RBC LEUKOCYTES REDUCED: HCPCS | Performed by: SURGERY

## 2022-07-20 PROCEDURE — 258N000003 HC RX IP 258 OP 636: Performed by: STUDENT IN AN ORGANIZED HEALTH CARE EDUCATION/TRAINING PROGRAM

## 2022-07-20 PROCEDURE — 85379 FIBRIN DEGRADATION QUANT: CPT | Performed by: SURGERY

## 2022-07-20 PROCEDURE — 83735 ASSAY OF MAGNESIUM: CPT | Performed by: SURGERY

## 2022-07-20 PROCEDURE — 85610 PROTHROMBIN TIME: CPT | Performed by: SURGERY

## 2022-07-20 PROCEDURE — 85520 HEPARIN ASSAY: CPT | Performed by: SURGERY

## 2022-07-20 PROCEDURE — 82805 BLOOD GASES W/O2 SATURATION: CPT | Performed by: SURGERY

## 2022-07-20 PROCEDURE — 85300 ANTITHROMBIN III ACTIVITY: CPT | Performed by: SURGERY

## 2022-07-20 PROCEDURE — 85384 FIBRINOGEN ACTIVITY: CPT | Performed by: SURGERY

## 2022-07-20 PROCEDURE — 85027 COMPLETE CBC AUTOMATED: CPT | Performed by: SURGERY

## 2022-07-20 PROCEDURE — 87040 BLOOD CULTURE FOR BACTERIA: CPT | Performed by: SURGERY

## 2022-07-20 PROCEDURE — 250N000009 HC RX 250: Performed by: STUDENT IN AN ORGANIZED HEALTH CARE EDUCATION/TRAINING PROGRAM

## 2022-07-20 PROCEDURE — 93750 INTERROGATION VAD IN PERSON: CPT | Performed by: INTERNAL MEDICINE

## 2022-07-20 PROCEDURE — 85018 HEMOGLOBIN: CPT | Performed by: SURGERY

## 2022-07-20 PROCEDURE — 250N000011 HC RX IP 250 OP 636: Performed by: SURGERY

## 2022-07-20 PROCEDURE — 250N000013 HC RX MED GY IP 250 OP 250 PS 637: Performed by: NURSE PRACTITIONER

## 2022-07-20 PROCEDURE — 83051 HEMOGLOBIN PLASMA: CPT | Performed by: SURGERY

## 2022-07-20 PROCEDURE — 200N000002 HC R&B ICU UMMC

## 2022-07-20 PROCEDURE — 85007 BL SMEAR W/DIFF WBC COUNT: CPT | Performed by: SURGERY

## 2022-07-20 PROCEDURE — 71045 X-RAY EXAM CHEST 1 VIEW: CPT | Mod: 26 | Performed by: RADIOLOGY

## 2022-07-20 PROCEDURE — 83605 ASSAY OF LACTIC ACID: CPT | Performed by: THORACIC SURGERY (CARDIOTHORACIC VASCULAR SURGERY)

## 2022-07-20 PROCEDURE — 82330 ASSAY OF CALCIUM: CPT | Performed by: SURGERY

## 2022-07-20 PROCEDURE — 74018 RADEX ABDOMEN 1 VIEW: CPT | Mod: 26 | Performed by: RADIOLOGY

## 2022-07-20 PROCEDURE — 250N000011 HC RX IP 250 OP 636: Performed by: STUDENT IN AN ORGANIZED HEALTH CARE EDUCATION/TRAINING PROGRAM

## 2022-07-20 PROCEDURE — 250N000011 HC RX IP 250 OP 636: Performed by: ANESTHESIOLOGY

## 2022-07-20 PROCEDURE — 83605 ASSAY OF LACTIC ACID: CPT | Performed by: STUDENT IN AN ORGANIZED HEALTH CARE EDUCATION/TRAINING PROGRAM

## 2022-07-20 PROCEDURE — 999N000157 HC STATISTIC RCP TIME EA 10 MIN

## 2022-07-20 PROCEDURE — 999N000065 XR ABDOMEN PORT 1 VIEW

## 2022-07-20 PROCEDURE — 999N000015 HC STATISTIC ARTERIAL MONITORING DAILY

## 2022-07-20 PROCEDURE — 83605 ASSAY OF LACTIC ACID: CPT | Performed by: SURGERY

## 2022-07-20 PROCEDURE — 82805 BLOOD GASES W/O2 SATURATION: CPT | Performed by: ANESTHESIOLOGY

## 2022-07-20 PROCEDURE — G0463 HOSPITAL OUTPT CLINIC VISIT: HCPCS

## 2022-07-20 PROCEDURE — 258N000003 HC RX IP 258 OP 636: Performed by: THORACIC SURGERY (CARDIOTHORACIC VASCULAR SURGERY)

## 2022-07-20 PROCEDURE — 82803 BLOOD GASES ANY COMBINATION: CPT | Performed by: SURGERY

## 2022-07-20 PROCEDURE — 82803 BLOOD GASES ANY COMBINATION: CPT | Performed by: ANESTHESIOLOGY

## 2022-07-20 PROCEDURE — 99232 SBSQ HOSP IP/OBS MODERATE 35: CPT | Mod: GC | Performed by: INTERNAL MEDICINE

## 2022-07-20 PROCEDURE — 74018 RADEX ABDOMEN 1 VIEW: CPT

## 2022-07-20 PROCEDURE — 82040 ASSAY OF SERUM ALBUMIN: CPT | Performed by: SURGERY

## 2022-07-20 PROCEDURE — 82805 BLOOD GASES W/O2 SATURATION: CPT | Performed by: THORACIC SURGERY (CARDIOTHORACIC VASCULAR SURGERY)

## 2022-07-20 PROCEDURE — 94003 VENT MGMT INPAT SUBQ DAY: CPT

## 2022-07-20 PROCEDURE — 82947 ASSAY GLUCOSE BLOOD QUANT: CPT | Performed by: SURGERY

## 2022-07-20 PROCEDURE — 99291 CRITICAL CARE FIRST HOUR: CPT | Mod: 25 | Performed by: INTERNAL MEDICINE

## 2022-07-20 PROCEDURE — 250N000013 HC RX MED GY IP 250 OP 250 PS 637: Performed by: ANESTHESIOLOGY

## 2022-07-20 RX ORDER — POLYETHYLENE GLYCOL 3350 17 G/17G
17 POWDER, FOR SOLUTION ORAL DAILY PRN
Status: DISCONTINUED | OUTPATIENT
Start: 2022-07-20 | End: 2022-08-21 | Stop reason: HOSPADM

## 2022-07-20 RX ORDER — HYDROXYZINE HYDROCHLORIDE 25 MG/1
25 TABLET, FILM COATED ORAL EVERY 6 HOURS PRN
Status: DISCONTINUED | OUTPATIENT
Start: 2022-07-20 | End: 2022-08-02

## 2022-07-20 RX ORDER — GUAR GUM
1 PACKET (EA) ORAL 3 TIMES DAILY
Status: DISCONTINUED | OUTPATIENT
Start: 2022-07-20 | End: 2022-08-08

## 2022-07-20 RX ORDER — HYDROXYZINE HYDROCHLORIDE 25 MG/1
50 TABLET, FILM COATED ORAL EVERY 6 HOURS PRN
Status: DISCONTINUED | OUTPATIENT
Start: 2022-07-20 | End: 2022-08-02

## 2022-07-20 RX ORDER — FUROSEMIDE 10 MG/ML
40 INJECTION INTRAMUSCULAR; INTRAVENOUS ONCE
Status: COMPLETED | OUTPATIENT
Start: 2022-07-20 | End: 2022-07-20

## 2022-07-20 RX ORDER — AMOXICILLIN 250 MG
1 CAPSULE ORAL 2 TIMES DAILY PRN
Status: DISCONTINUED | OUTPATIENT
Start: 2022-07-20 | End: 2022-08-21 | Stop reason: HOSPADM

## 2022-07-20 RX ORDER — POTASSIUM CHLORIDE 29.8 MG/ML
20 INJECTION INTRAVENOUS ONCE
Status: COMPLETED | OUTPATIENT
Start: 2022-07-20 | End: 2022-07-20

## 2022-07-20 RX ORDER — GUAR GUM
1 PACKET (EA) ORAL 3 TIMES DAILY
Status: DISCONTINUED | OUTPATIENT
Start: 2022-07-20 | End: 2022-07-20

## 2022-07-20 RX ADMIN — HYDROXYCHLOROQUINE SULFATE 200 MG: 200 TABLET, FILM COATED ORAL at 08:04

## 2022-07-20 RX ADMIN — ACETAMINOPHEN 975 MG: 325 TABLET, FILM COATED ORAL at 21:24

## 2022-07-20 RX ADMIN — VALPROIC ACID 250 MG: 250 SOLUTION ORAL at 17:05

## 2022-07-20 RX ADMIN — OXYCODONE HYDROCHLORIDE 10 MG: 10 TABLET ORAL at 21:24

## 2022-07-20 RX ADMIN — PIPERACILLIN AND TAZOBACTAM 4.5 G: 4; .5 INJECTION, POWDER, LYOPHILIZED, FOR SOLUTION INTRAVENOUS at 16:56

## 2022-07-20 RX ADMIN — PROPOFOL 10 MCG/KG/MIN: 10 INJECTION, EMULSION INTRAVENOUS at 05:09

## 2022-07-20 RX ADMIN — DEXMEDETOMIDINE HYDROCHLORIDE 1.2 MCG/KG/HR: 4 INJECTION, SOLUTION INTRAVENOUS at 00:27

## 2022-07-20 RX ADMIN — PIPERACILLIN AND TAZOBACTAM 4.5 G: 4; .5 INJECTION, POWDER, LYOPHILIZED, FOR SOLUTION INTRAVENOUS at 10:51

## 2022-07-20 RX ADMIN — PIPERACILLIN AND TAZOBACTAM 4.5 G: 4; .5 INJECTION, POWDER, LYOPHILIZED, FOR SOLUTION INTRAVENOUS at 23:25

## 2022-07-20 RX ADMIN — HYDROXYZINE HYDROCHLORIDE 50 MG: 25 TABLET ORAL at 19:53

## 2022-07-20 RX ADMIN — Medication 40 MG: at 08:04

## 2022-07-20 RX ADMIN — DULOXETINE 30 MG: 30 CAPSULE, DELAYED RELEASE ORAL at 08:04

## 2022-07-20 RX ADMIN — HYDROXYCHLOROQUINE SULFATE 200 MG: 200 TABLET, FILM COATED ORAL at 19:32

## 2022-07-20 RX ADMIN — ONDANSETRON 4 MG: 2 INJECTION INTRAMUSCULAR; INTRAVENOUS at 13:49

## 2022-07-20 RX ADMIN — INSULIN GLARGINE 20 UNITS: 100 INJECTION, SOLUTION SUBCUTANEOUS at 08:04

## 2022-07-20 RX ADMIN — HYDRALAZINE HYDROCHLORIDE 25 MG: 25 TABLET, FILM COATED ORAL at 13:40

## 2022-07-20 RX ADMIN — ACETAMINOPHEN 975 MG: 325 TABLET, FILM COATED ORAL at 05:04

## 2022-07-20 RX ADMIN — Medication 1 PACKET: at 13:41

## 2022-07-20 RX ADMIN — FLUTICASONE PROPIONATE AND SALMETEROL XINAFOATE 2 PUFF: 115; 21 AEROSOL, METERED RESPIRATORY (INHALATION) at 19:35

## 2022-07-20 RX ADMIN — HYDRALAZINE HYDROCHLORIDE 25 MG: 25 TABLET, FILM COATED ORAL at 21:26

## 2022-07-20 RX ADMIN — VALPROIC ACID 250 MG: 250 SOLUTION ORAL at 13:41

## 2022-07-20 RX ADMIN — PIPERACILLIN AND TAZOBACTAM 4.5 G: 4; .5 INJECTION, POWDER, LYOPHILIZED, FOR SOLUTION INTRAVENOUS at 05:04

## 2022-07-20 RX ADMIN — OXYCODONE HYDROCHLORIDE 10 MG: 10 TABLET ORAL at 09:37

## 2022-07-20 RX ADMIN — CALCIUM GLUCONATE 1 G: 20 INJECTION, SOLUTION INTRAVENOUS at 21:34

## 2022-07-20 RX ADMIN — Medication 10 MG: at 19:32

## 2022-07-20 RX ADMIN — OXYCODONE HYDROCHLORIDE 10 MG: 10 TABLET ORAL at 17:05

## 2022-07-20 RX ADMIN — DEXMEDETOMIDINE HYDROCHLORIDE 1.2 MCG/KG/HR: 4 INJECTION, SOLUTION INTRAVENOUS at 04:58

## 2022-07-20 RX ADMIN — MULTIVITAMIN 15 ML: LIQUID ORAL at 08:04

## 2022-07-20 RX ADMIN — AMIODARONE HYDROCHLORIDE 0.5 MG/MIN: 50 INJECTION, SOLUTION INTRAVENOUS at 02:35

## 2022-07-20 RX ADMIN — FUROSEMIDE 40 MG: 10 INJECTION, SOLUTION INTRAVENOUS at 16:04

## 2022-07-20 RX ADMIN — FLUTICASONE PROPIONATE AND SALMETEROL XINAFOATE 2 PUFF: 115; 21 AEROSOL, METERED RESPIRATORY (INHALATION) at 08:05

## 2022-07-20 RX ADMIN — HYDROMORPHONE HYDROCHLORIDE 0.2 MG: 0.2 INJECTION, SOLUTION INTRAMUSCULAR; INTRAVENOUS; SUBCUTANEOUS at 18:30

## 2022-07-20 RX ADMIN — Medication 1 PACKET: at 08:04

## 2022-07-20 RX ADMIN — ASPIRIN 81 MG CHEWABLE TABLET 81 MG: 81 TABLET CHEWABLE at 08:04

## 2022-07-20 RX ADMIN — POTASSIUM CHLORIDE 20 MEQ: 29.8 INJECTION, SOLUTION INTRAVENOUS at 08:26

## 2022-07-20 RX ADMIN — Medication 1 PACKET: at 19:32

## 2022-07-20 RX ADMIN — OXYCODONE HYDROCHLORIDE 10 MG: 10 TABLET ORAL at 05:05

## 2022-07-20 RX ADMIN — QUETIAPINE FUMARATE 100 MG: 100 TABLET ORAL at 21:23

## 2022-07-20 RX ADMIN — DEXMEDETOMIDINE HYDROCHLORIDE 1.2 MCG/KG/HR: 4 INJECTION, SOLUTION INTRAVENOUS at 09:37

## 2022-07-20 RX ADMIN — ACETAMINOPHEN 975 MG: 325 TABLET, FILM COATED ORAL at 13:41

## 2022-07-20 RX ADMIN — OXYCODONE HYDROCHLORIDE 10 MG: 10 TABLET ORAL at 03:17

## 2022-07-20 RX ADMIN — AMIODARONE HYDROCHLORIDE 0.5 MG/MIN: 50 INJECTION, SOLUTION INTRAVENOUS at 17:44

## 2022-07-20 RX ADMIN — HEPARIN SODIUM 1500 UNITS/HR: 10000 INJECTION, SOLUTION INTRAVENOUS at 13:38

## 2022-07-20 ASSESSMENT — ACTIVITIES OF DAILY LIVING (ADL)
ADLS_ACUITY_SCORE: 38

## 2022-07-20 NOTE — PROVIDER NOTIFICATION
0830: CVICU team rounded and asked to have RVAD turned down to 1.5L. Team made aware of significant fibrin and clot in the cannulas and want to proceed anyway. ECMO specialist came and turned down RVAD to 2700 RPM to get 1.5L. Pt tolerated well.     1140: RVAD alarming low flows with flow down to 1.2L. ECMO specialist at bedside and turned RPM up to 3150 to get back to 1.5L of flow. CVICU and Cards 2 teams made aware and came to see pt at bedside. No other hemodynamic changes.

## 2022-07-20 NOTE — ANESTHESIA CARE TRANSFER NOTE
Patient: Dandy Sands    Procedure: Procedure(s):  IRRIGATION AND DEBRIDEMENT, CHEST       Diagnosis: Heart failure (H) [I50.9]  Diagnosis Additional Information: No value filed.    Anesthesia Type:   General     Note:      Level of Consciousness: iatrogenic sedation      Independent Airway: airway patency not satisfactory and stable  Dentition: dentition unchanged  Vital Signs Stable: post-procedure vital signs reviewed and stable  Report to RN Given: handoff report given  Patient transferred to: ICU    ICU Handoff: Call for PAUSE to initiate/utilize ICU HANDOFF, Identified Patient, Identified Responsible Provider, Reviewed the Pertinent Medical History, Discussed Surgical Course, Reviewed Intra-OP Anesthesia Management and Issues during Anesthesia, Set Expectations for Post Procedure Period and Allowed Opportunity for Questions and Acknowledgement of Understanding      Vitals:  Vitals Value Taken Time   BP 79/62 07/19/22 1642   Temp 37.2  C (98.96  F) 07/20/22 1221   Pulse 118 07/20/22 1445   Resp 24 07/20/22 1245   SpO2 100 % 07/20/22 1441   Vitals shown include unvalidated device data.    Electronically Signed By: Rigoberto Navarro MD  July 20, 2022  2:47 PM

## 2022-07-20 NOTE — PROGRESS NOTES
[facility] Nurse Inpatient Assessment     Consulted for: R) temporal wound    Patient History (according to provider note(s):      Dandy Sands is a 61 year old male with a PMH of CAD s/p PCI to LAD, MI, ICM, acute on chronic heart failure, s/p CRT-D, severe MR, antiphospholipid antibody syndrome on chronic warfarin, SLE, HTN, HLD, recent hospitalization 5/16-5/26 s/p RCA, LAD stenting who underwent RVAD (29F Protek duo RIJ) LVAD placement (HeartMate 3) on 7/8/22 by Dr. Zamora.    Areas Assessed:      Areas visualized during today's visit: head    Pressure Injury Location: R) temporal area    7/13 7/20    Last photo: 7/13  Wound type: Pressure Injury     Pressure Injury Stage: Deep Tissue Pressure Injury (DTPI), hospital acquired      This is a Medical Device Related Pressure Injury (MDRPI) due to RVAD Cannula was pressing against the area and wrapped with coban. Currently has optifoam in place and tube is pulled away from the area avoiding direct pressure.  Wound history/plan of care:   Per RN tube was directly laying on the area,    Wound base: 100 % non-blanchable, maroon, purple and epidermis,      Palpation of the wound bed: normal      Drainage: none     Description of drainage: none     Measurements (length x width x depth, in cm) 1.5  x 3.2  x  0.0 cm;      Tunneling N/A     Undermining N/A  Periwound skin: Intact      Color: normal and consistent with surrounding tissue      Temperature: normal   Odor: none  Pain: unable to assess due to  sedation , none  Pain intervention prior to dressing change: N/A  Treatment goal: Heal  and Protection  STATUS: evolving  Supplies ordered: supplies stored on unit    Treatment Plan:     R) temporal area wound(s): Daily    Continue to use optifoam around the cannula to avoid direct pressure and ensure the area is free of pressure. If area opens use Mepilex foam dressing to cover the area.      Orders:  Reviewed    RECOMMEND PRIMARY TEAM ORDER: None, at this time  Education provided: importance of repositioning, plan of care, wound progress and Off-loading pressure  Discussed plan of care with: Nurse  WO nurse follow-up plan: weekly  Notify WOC if wound(s) deteriorate.  Nursing to notify the Provider(s) and re-consult the WOC Nurse if new skin concern.    DATA:     Current support surface: Standard  Low air loss mattress  Containment of urine/stool: Indwelling catheter  BMI: Body mass index is 28.66 kg/m .   Active diet order: Orders Placed This Encounter      NPO per Anesthesia Guidelines for Procedure/Surgery Except for: Meds         Labs: Recent Labs     Recent Labs   Lab Test 07/20/22  0949 07/20/22  0355 06/19/22  2157 06/19/22  1522 05/20/22  1715 05/20/22  0516   ALBUMIN  --  2.7*   < > 2.8*   < > 3.1*   HGB 8.5* 8.1*   < > 8.5*   < > 8.6*   INR 1.29* 1.35*   < >  --    < > 1.24*   WBC 15.9* 13.9*   < > 5.2   < > 5.5   A1C  --   --   --   --   --  5.5   CRP  --   --   --  28.0*  --   --     < > = values in this interval not displayed.       Pressure injury risk assessment:   Sensory Perception: 2-->very limited  Moisture: 4-->rarely moist  Activity: 2-->chairfast  Mobility: 2-->very limited  Nutrition: 3-->adequate  Friction and Shear: 1-->problem  Case Score: 14    Genevieve Mattson RN [facility]   Pager: [phone]  Ph: [phone]

## 2022-07-20 NOTE — PROGRESS NOTES
Rheumatology Follow Up Note     Dandy Sands MRN# [id number]   YOB: 1960 Age: 61 year old     Date of Service: July 20, 2022  Primary care provider: Scar Jeffrey  Referring Provider: Darius Zamora, *    Assessment :     61 year old male with history of CAD s/p remote PCI to LAD, ICM LVEF 30% s/p CRT-D, severe MR, SLE and APL with hx of DVT/PE on chronic anticoagulation with warfarin, hx of COVID-19 1/2022 (was hospitalized, not intubated), HTN, and HLD who was admitted to [facility] 5/16/2022 as a transfer from [facility] in [place] for evaluation of multivessel coronary artery disease and moderate-severe functional mitral regurgitation. Rheumatology consulted for evaluation of his current immunosuppressive regimen in setting of surgery.     Extubated today with plans to wean RVAD and potential removal later today or tomorrow per cardiology. Patient encephalopathic after extubation but denies pain or SOB at this time. Continues on Zosyn planned through 7/22 for HAP/sepsis.     dsDNA returned slightly elevated to 13, improved from 22 last time checked. C3/C4 both within normal limits. Given previously well-controlled SLE on Plquenil, do not see compelling reason to resume CellCept particularly in the setting of infection.      Recommendation:   #. Previously diagnosed APL w/ prior PE and DVT on chronic AC   #. Previously diagnosed SLE (LASHANDA+, dsDNA+, anti-Smith+, anti-RNP ab+, anti-histone ab+)  -He has been on plaquenil for many years. He was previously on azathioprine until summer of 2020 when he had some increased arthritis, anemia and mild proteinuria. He was switched to cellcept at that time. He has also been on chronic anticoagulation with warfarin.   -Currently patient is on Plaquenil. CellCept currently on hold.  -With an active ongoing infection, will continue to hold CellCept, particularly given current good SLE control on plaquenil (C3/C4 wnl, dsDNA minimally elevated  but improved from prior)    Rheumatology will continue to follow peripherally. Please let us know when patient is nearing readiness for discharge or if other concerns arise and we will evaluate for timing of CellCept resumption at that time.     Discussed with attending, Dr. Sterling who agrees with assessment and plan.    Ann Hernandez MD  IM PGY-3  Rheumatology Service     I saw this patient with the medical resident Dr. Hernandez. I agree with the stated findings and recommendations which reflect our joint impressions and plan. No sign of SLE activiity, but patient cardiovascular status is tenuous. Plan continue hydroxychloroquine treatment alone; resume MMF only when patient more stable from ID and CV perspectives.    James Sterling M.D.  Staff Rheumatologist,  Health  Pager [phone]    -----------------------------------------------------------------------------------------------------------------    Interval History:   Extubated today and remains on precedex gtt, remains confused post-extubation.  Started on valproate by primary team for agitation. Plan per cards for RVAD removal.     Patient seen at bedside. He denies pain or difficulty breathing at this time primarily with shaking head, minimal verbal response to questions.     ROS: 10 points reviewed and all negative except that mentioned in HPI.    Patient Active Problem List   Diagnosis    Decompensated heart failure (H)    Cardiogenic shock (H)    LVAD (left ventricular assist device) present (H)      No past medical history on file.  Past Surgical History:   Procedure Laterality Date    COLONOSCOPY N/A 05/23/2022    Procedure: COLONOSCOPY;  Surgeon: Parth Meneses MD;  [facility]: [facility]    CV CORONARY ANGIOGRAM N/A 5/24/2022    Procedure: Coronary Angiogram;  Surgeon: Mike Pope MD;  Location:  [facility] CARDIAC CATH LAB    CV CORONARY ANGIOGRAM N/A 5/29/2022    Procedure: Coronary Angiogram;  Surgeon: Austin Bright MD;  Location:   HEART CARDIAC CATH LAB    CV CORONARY LITHOTRIPSY PCI Bilateral 5/24/2022    Procedure: Percutaneous Coronary Intervention - Lithotripsy;  Surgeon: Mike Pope MD;  [facility]    CV INTRA AORTIC BALLOON N/A 6/17/2022    Procedure: Intraprocedure Aortic Balloon Pump Insertion;  Surgeon: Sherman Cerda MD;  Location:  HEART CARDIAC CATH LAB    CV INTRA AORTIC BALLOON Right 7/3/2022    Procedure: Reposition of Subclavian Intraprocedure Aortic Balloon Pump;  Surgeon: Austin Bright MD;  Location:  HEART CARDIAC CATH LAB    CV INTRAVASULAR ULTRASOUND N/A 5/24/2022    Procedure: Intravascular Ultrasound;  Surgeon: Mike Pope MD;  Location:  HEART CARDIAC CATH LAB    CV PCI ANGIOPLASTY N/A 5/29/2022    Procedure: Percutaneous Transluminal Angioplasty;  Surgeon: Austin Bright MD;  Location:  HEART CARDIAC CATH LAB    CV PCI STENT DRUG ELUTING N/A 5/24/2022    Procedure: Percutaneous Coronary Intervention Stent;  Surgeon: Mike Pope MD;  Location:  HEART CARDIAC CATH LAB    CV RIGHT HEART CATH MEASUREMENTS RECORDED N/A 05/18/2022    Procedure: Right Heart Catheterization;  Surgeon: Justo Stewart MD;  Location:  HEART CARDIAC CATH LAB    CV RIGHT HEART CATH MEASUREMENTS RECORDED N/A 5/24/2022    Procedure: Right Heart Catheterization;  Surgeon: Mike Pope MD;  [facility]    CV RIGHT HEART CATH MEASUREMENTS RECORDED N/A 5/29/2022    Procedure: Right Heart Catheterization;  Surgeon: Austin Bright MD;  Location:  HEART CARDIAC CATH LAB    CV RIGHT HEART CATH MEASUREMENTS RECORDED N/A 7/1/2022    Procedure: Right Heart Catheterization;  Surgeon: Mike Pope MD;  Location:  HEART CARDIAC CATH LAB    CV RIGHT HEART EXERCISE STRESS STUDY N/A 05/18/2022    Procedure: Stress Drug Study;  Surgeon: Justo Stewart MD;  Location:  HEART CARDIAC CATH LAB    CV SWAN CHOCO PROCEDURE N/A 6/17/2022    Procedure: Alder  Janae Procedure;  Surgeon: Sherman Cerda MD;  [facility]    INCISION AND DRAINAGE CHEST WASHOUT, COMBINED N/A 7/11/2022    Procedure: Chest washout and closure;  Surgeon: Darnell Morgan MD;  [facility]: [facility]    INCISION AND DRAINAGE CHEST WASHOUT, COMBINED N/A 7/12/2022    Procedure: INCISION AND DRAINAGE, WOUND, CHEST, WITH IRRIGATION;  Surgeon: Darius Zamora MD;  Location: [facility]    INSERT ARTERIAL LINE  7/18/2022         INSERT INTRAAORTIC BALLOON PUMP Right 6/23/2022    Procedure: INSERTION, INTRA-AORTIC BALLOON PUMP RIGHT SUBCLAVIAN ARTERY.;  Surgeon: Hayden Veras MD;  Location: [facility]    INSERT VENTRICULAR ASSIST DEVICE LEFT (HEARTMATE II/III) MINIMALLY INVASIVE N/A 7/8/2022    Procedure: MEDIAN STERNOTOMY.  CARDIOPULMONARY BYPASS.  INTRAOPERATIVE TRANSESOPHAGEAL ECHOCARDIOGRAM.  IMPLANT LEFT VENTRICULAR ASSIST DEVICE HEARTMATE III.  PLACEMENT OF PERCUTANEOUS RIGHT VENTRICULAR ASSIST DEVICE.  TEMPORARY CHEST CLOSURE;  Surgeon: Darius Zamora MD;  Location: [facility]    IRRIGATION AND DEBRIDEMENT CHEST WASHOUT, COMBINED N/A 7/13/2022    Procedure: IRRIGATION AND DEBRIDEMENT, CHEST;  Surgeon: Darius Zamora MD;  Location: [facility]    PICC DOUBLE LUMEN PLACEMENT Right 05/28/2022    right basilic 5 fr dl picc 40 cm     Social History     Socioeconomic History    Marital status: Single     Spouse name: Not on file    Number of children: Not on file    Years of education: Not on file    Highest education level: Not on file   Occupational History    Not on file   Tobacco Use    Smoking status: Not on file    Smokeless tobacco: Not on file   Substance and Sexual Activity    Alcohol use: Not on file    Drug use: Not on file    Sexual activity: Not on file   Other Topics Concern    Not on file   Social History Narrative    Not on file     Social Determinants of Health     Financial Resource Strain: Not on file   Food Insecurity: Not on file   Transportation Needs: Not  on file   Physical Activity: Not on file   Stress: Not on file   Social Connections: Not on file   Intimate Partner Violence: Not on file   Housing Stability: Not on file     No family history on file.  No current outpatient medications on file.       PE: Temp:  [94.6  F (34.8  C)-99.7  F (37.6  C)] 98.6  F (37  C)  Pulse:  [0-120] 112  Resp:  [16-36] 28  BP: (79)/(62) 79/62  MAP:  [57 mmHg-283 mmHg] 94 mmHg  Arterial Line BP: ()/() 103/82  FiO2 (%):  [35 %-100 %] 35 %  SpO2:  [99 %-100 %] 100 %  Constitutional: chronically-ill appearing, RVAD device in place  Skin: no nail pitting, alopecia, rash, nodules or lesions  Neck: no mass, non-tender thyroid  Resp: non-labored on HFNC  CV: RRR, no murmurs, rubs or gallops, no edema  GI: soft, non-tender, non-distended  Neuro: disoriented and somewhat lethargic but following commands and shaking head yes/no to questions   MS: no active synovitis    MEDS/LABS/IMAGING: reviewed, of note:    Lab Results   Component Value Date    WBC 23.0 07/20/2022     Lab Results   Component Value Date    RBC 3.03 07/20/2022     Lab Results   Component Value Date    HGB 8.7 07/20/2022     Lab Results   Component Value Date    HCT 27.4 07/20/2022     Lab Results   Component Value Date    MCV 90 07/20/2022     Lab Results   Component Value Date    MCH 28.7 07/20/2022     Lab Results   Component Value Date    MCHC 31.8 07/20/2022     Lab Results   Component Value Date    RDW 19.7 07/20/2022     Lab Results   Component Value Date     07/20/2022        Last Basic Metabolic Panel:  Lab Results   Component Value Date     07/20/2022      Lab Results   Component Value Date    POTASSIUM 4.2 07/20/2022    POTASSIUM 4.0 07/13/2022    POTASSIUM 5.2 07/09/2022     Lab Results   Component Value Date    CHLORIDE 106 07/20/2022    CHLORIDE 106 07/04/2022     Lab Results   Component Value Date    ELDA 8.1 07/20/2022     Lab Results   Component Value Date    CO2 22 07/20/2022    CO2 24  07/04/2022     Lab Results   Component Value Date    BUN 30.4 07/20/2022    BUN 13 07/04/2022     Lab Results   Component Value Date    CR 0.76 07/20/2022     Lab Results   Component Value Date     07/20/2022     07/20/2022     07/04/2022      C4 28  C3 113  dsDNA 13 (22 x2 mos ago)

## 2022-07-20 NOTE — PROGRESS NOTES
CV ICU PROGRESS NOTE  07/20/2022        CO-MORBIDITIES  1. Cardiogenic shock (H)  (primary encounter diagnosis)  2. Ischemic cardiomyopathy  3. Heart failure with reduced ejection fraction, NYHA class III (H)  4. Coronary artery disease involving native coronary artery of native heart without angina pectoris      ASSESSMENT Dandy Sands is a 61 year old male with a PMH of CAD s/p PCI to LAD, MI, ICM, acute on chronic heart failure, s/p CRT-D, severe MR, antiphospholipid antibody syndrome on chronic warfarin, SLE, HTN, HLD, recent hospitalization 5/16-5/26 s/p RCA, LAD stenting who underwent RVAD (29F Protek duo RIJ) LVAD placement (HeartMate 3) on 7/8/22 by Dr. Zamora. Intraoperative course complicated by IABP dysfunction and RV failure, requiring crash onto bypass / vasopressor support, NO, and protek placement. Now s/p chest closure and NO wean 7/11. Return to OR for hemopericardium (7/12) and chest re-closure 7/13.    Today's Progress:  - I/O goal: net even  - Extubated today  - RVAD flows reduced.  - Cardiology will plan to wean RVAD either today or tomorrow. Will continue to trend SvO2(goal >40)  - Added Valproate po 250 mg TID for agitation        Overnight Events:  - Patient continued to be agitated at night      PLAN:   Neuro/ pain/ sedation:  #Acute Postoperative pain  #Depression   #Anxiety due to a medical condition  #Insomnia  - Monitor neurological status. Notify the MD for any acute changes in exam.  - Pain: fentanyl gtt. Scheduled tylenol, scheduling oxycodone 10mg q4h. PRN tylenol, oxycodone, dilaudid. Wean fentanyl  - Sedation: precedex, propofol gtt, RASS 0 to -1. Wean propofol.  - Duloxetine 30mg, Melatonin 10 mg HS, Seroquel 100 mg HS, Valproate po 250 mg TID  - Holding PTA meds: hydroxyzine 25mg PRN, zolpidem 5mg, melatonin 3mg, lorazepam 0.5mg  - QTc(7/18)- 683 msec, hence no haldol    Pulmonary:   #Acute hypoxic respiratory failure on iMV  #Mild-moderate emphysema  #History of COVID-19  -  Off nitric 7/12  - Ventilated: RR- 12, Tv- 450, PEEP- 5, FiO2- 35%  - Daily PST and attempt to wean, extubate today.    Cardiovascular:    #S/p LVAD placement (HeartMate 3) on 7/8/22   #S/p RVAD (29F Protek duo RIJ) on 7/8/22  #S/p chest closure 7/11  #Cardiogenic shock  #Advanced ischemic cardiomyopathy, class IV, stage D  #CAD s/p PCI to LAD (2005)  #S/p CRT-D (2006)  #History of MI (2007)  #Severe MR  #Antiphospholipid antibody syndrome on chronic warfarin  #HTN, HLD  #Recent hospitalization 5/16-5/26 s/p RCA, LAD stenting  #Atrial fibrillation   - Monitor hemodynamic status. Chest closure and oxygenator splicing 7/11. Reopened 7/12 for I&D and left open, plan for closure 7/13.  - Goal MAP > 65   - PTA meds(held): atorvastatin 40mg, clopidogrel 75mg, lasix 40mg, warfarin 5mg  - LVAD management per Advanced HF service   - flows stable, circuit functioning w/o clots/fibrin  - AC Plan: high intensity heparin gtt  - Aspirin 81 mg  - Hydralazine uriel and prn MAP >85  - Amiodarone gtt  - Formal TTE 7/17- HM3 LVAD at 5200 RPM and Protek Duo RVAD at 4.3L/min.  Normal LV size with severely reduced global LV function, LVEF<20%. LVIDd= 4.6  Cm. Normal RV size and mildly reduced RV function, RVFAC 31%. The ventricular  septum is midline. The aortic valve opens minimally with each beat. There is no AI.  LVAD inflow cannula is visualized in the LV apex. Normal Doppler interrogation  of the LVAD inflow cannula. LVAD outflow graft is not well assessed.  Organized material is present in the pericardial space, most prominently  anterior to the RV free wall. There is no evidence of pericardial tamponade.  This was also present on the 7/12/22 study, but is better imaged on today's  Study.  - Cardiology will plan to wean RVAD either today or tomorrow. Will continue to trend SvO2(goal >40)          GI / Nutrition:   #GERD  #Congestive hepatopathy in the setting of cardiac insuffiency- resolved  - NPO 2/2 intubation   - Titrate NJT to  goal  - Bowel regimen (miralax / senna)  - Nutrition consulted  - Trend LFT's every alternate day    Hematemesis / oral mucosal bleedin/12 x2, 7/13 x1 (oral, around ETT/ETT clear).   - continue PPI once daily  - hgb stable   - OG without patience bloody output     Renal/ Fluid Balance:    - Strict I/O, daily weights   - Avoid/limit nephrotoxins as able  - Replete lytes PRN per protocol  - PTA meds: tamsulosin 0.4mg (held)  - Fluid goal : net even    Endocrine:    #Stress induced hyperglycemia  Preop A1c 5.5%  - Insulin lantus and sliding scale  - Goal BG <180 for optimal healing       ID/ Antibiotics:  #Periopearive antibiosis (s/p course of Cefepime, vancomycin, rifampin, fluconazole)  #Enterococcus faecalis PNA  - Discontinue Cefepime and Vancomycin (-), chest now closed   - Trend fever curve   - Pan culture (): Bcx x2, UA/UC -> negative     - Endotracheal sputum culture (4+ candida albicans, 1+ E.Coli, 3+ enterococcus faecalis)  -  C.diff- negative  - Zosyn for HAP(7/15-)    Heme:     #Acute blood loss anemia  #Acute blood loss thrombocytopenia  #History of DVT and PE   #Antiphospholipid antibody syndrome  #History of iron deficiency anemia  - Monitor for s/sx of bleeding  - s/p 3u pRBC, 2u Plt  perioperative needs  - transfused 2 units 7/15, 1 unit   - Trend CBC and coags.  - Hgb goal >8  - Plt goal > 20    Rheumatology:  #SLE  - Chronic, symptoms include arthritis, photosensitivity, fatigue, and skin manifestations  - PTA meds: hydroxychloroquine 200mg, resumed   - Rheumatology consulted and following. Follow dsDNA and complement levels(). Will need cellcept restarted when appropriate.    Prophylaxis:    - DVT prophylaxis: SCD + high intensity heparin gtt   - Aspirin 81 mg  - PPI  - PT/OT  - Left upper extremity US()- no evidence of DVT    Lines / Tubes / Drains:  - R triple lumen PICC(-)  - Left Axillary A-line(-)  - Fitzgerald(-)  - NJT(-)  - Chest  Tubes(7/8-)  - Rectal tube      Disposition:  - CVICU. Patient did not want any family member to be updated.    Patient seen, findings and plan discussed with CV ICU staff, Dr. Davidson and CVICU fellow, Dr. Agus Grover.    Usha Celestin MD  Surgery PGY-4    ----------------------------------------------------------------      Subjective:  Mentioned as above.    OBJECTIVE:   1. VITAL SIGNS:   Temp:  [94.6  F (34.8  C)-99.7  F (37.6  C)] 98.2  F (36.8  C)  Pulse:  [0-130] 75  Resp:  [16-24] 17  BP: ()/(62-96) 79/62  MAP:  [57 mmHg-283 mmHg] 60 mmHg  Arterial Line BP: ()/() 63/55  FiO2 (%):  [35 %-65 %] 35 %  SpO2:  [100 %] 100 %  Vent Mode: CMV/AC  (Continuous Mandatory Ventilation/ Assist Control)  FiO2 (%): 35 %  Resp Rate (Set): 12 breaths/min  Tidal Volume (Set, mL): 450 mL  PEEP (cm H2O): 5 cmH2O  Pressure Support (cm H2O): 7 cmH2O  Resp: 17      2. INTAKE/ OUTPUT:   I/O last 3 completed shifts:  In: 4248.31 [I.V.:2168.31; NG/GT:580]  Out: 2150 [Urine:870; Emesis/NG output:350; Stool:700; Chest Tube:230]    3. PHYSICAL EXAMINATION:   General: critically ill  Neuro: follow commands, off sedation  Resp: on HFNC  CV: rate controlled with amiodarone  Abdomen: Soft, Non-distended, Non-tender  Incisions: c/d/i  Extremities: warm and well perfused    4. INVESTIGATIONS:   Arterial Blood Gases   Recent Labs   Lab 07/20/22  0356 07/20/22  0013 07/19/22  1933 07/19/22  1601   PH 7.46* 7.47* 7.49* 7.49*   PCO2 37 36 34* 34*   PO2 136* 116* 103 153*   HCO3 26 26 26 26     Complete Blood Count   Recent Labs   Lab 07/20/22  0355 07/19/22 2049 07/19/22  1601 07/19/22  0946   WBC 13.9* 22.4* 20.1* 18.5*   HGB 8.1* 8.2* 8.2* 8.3*    200 174 164     Basic Metabolic Panel  Recent Labs   Lab 07/20/22  0404 07/20/22  0023 07/19/22 2049 07/19/22  1941 07/19/22  1601 07/19/22  1218 07/19/22  0946 07/19/22  0331 07/19/22  0322   NA  --   --  140  --  141  --  140  --  141   POTASSIUM  --   --  4.8  --  4.1  --   4.1  --  3.6   CHLORIDE  --   --  107  --  106  --  106  --  106   CO2  --   --  21*  --  24  --  24  --  27   BUN  --   --  27.0*  --  25.7*  --  25.4*  --  24.2*   CR  --   --  0.69  --  0.68  --  0.63*  --  0.65*   * 111* 151* 125* 134*   < > 153*   < > 114*    < > = values in this interval not displayed.     Liver Function Tests  Recent Labs   Lab 07/20/22 0355 07/19/22 2049 07/19/22  1601 07/19/22  0946 07/19/22  0322 07/18/22 2118 07/18/22  1526 07/18/22  0954 07/18/22  0422 07/17/22  1006 07/17/22  0408   AST  --   --   --   --  24  --  23  --   --   --   --    ALT  --   --   --   --  11  --  12  --  13  --  13   ALKPHOS  --   --   --   --  146*  --  187*  --   --   --   --    BILITOTAL  --   --   --   --  0.6  --  0.7  --   --   --   --    ALBUMIN  --   --   --   --  2.5*  --  2.3*  --  2.6*  --  2.6*   INR 1.35* 1.31* 1.30* 1.35* 1.35*   < > 1.31*   < > 1.23*   < > 1.11    < > = values in this interval not displayed.     Pancreatic Enzymes  No lab results found in last 7 days.  Coagulation Profile  Recent Labs   Lab 07/20/22 0355 07/19/22 2049 07/19/22  1601 07/19/22  0946   INR 1.35* 1.31* 1.30* 1.35*   * 150* 55* 194*         5. RADIOLOGY:   Recent Results (from the past 24 hour(s))   XR Chest Port 1 View    Impression    RESIDENT PRELIMINARY INTERPRETATION  IMPRESSION:   1. Supportive devices in similar positioning.  2. Slightly worsening bilateral mixed pulmonary opacities.       =========================================

## 2022-07-20 NOTE — PROGRESS NOTES
Major Shift Events:   Pt neuro remains the same. Unable to follow commands. Sedation utilized to help decrease demands. MAP >65. Faint but dopplerable pulses. RVAD RPM had to be increased to 3400 in order to maintain Flow of 1.9. 500 albumin and 1 RBC given. LVAD settings remain similar. SR. 35% fio2 CMV on vent. TF at goal 50 ml/hr with standard FWF. Minimal UO.     Plan:  discontinue RVAD this week  Continue POC    For vital signs and complete assessments, please see documentation flowsheets.

## 2022-07-20 NOTE — PLAN OF CARE
Major Shift Events: Pt able to wake up when off sedation. Now alert and oriented to self. Follows commands and moves all extremities. NSR-ST on telemetry. LVAD with no alarms (see flowsheets for numbers). RVAD turned down today, large amount of clot and fibrin in cannulas, multiple alarms, see provider notification note for details on RVAD today. Pt extubated to HFNC at 1230, now at 35%/60L. Tolerating well. Good UOP via marcelo, lasix given x1. Large amount of stool via rectal tube. NJ with TF at goal. Ct with moderate output today.     Plan: Decannulate from RVAD today or tomorrow per Surgeon. Work with therapy. Monitor respiratory and cardiac status.     For vital signs and complete assessments, please see documentation flowsheets.

## 2022-07-20 NOTE — PROGRESS NOTES
VAD Shift Summary:      VAD Equipment:  Primary console serial number: [id number]  Backup console serial number: [id number]  Circuit lot number: [id number]  Pump head lot number: [id number]      Patient remains on RVAD, all equipment is functioning and alarms are appropriately set. RPM's 3150 with flow range 1.54-1.63 L/min. Circuit remains free of air with clot and  signinficant fibrin in the circuit Dr. Zamora and team aware. Cannulas are secure with no bleeding from site.     Significant Shift Events: RVAD flows lowered to 1.5 LPM per Dr. Zamora this am, RPM's increased from 2700 to 3150 to attain the same amount of flow through the RVAD. Pt extubated to high flow nasal cannula.     Vent settings:  Vent Mode: (S) CPAP/PS  (Continuous positive airway pressure with Pressure Support)  FiO2 (%): 35 %  Resp Rate (Set): 12 breaths/min  Tidal Volume (Set, mL): 450 mL  PEEP (cm H2O): 5 cmH2O  Pressure Support (cm H2O): 7 cmH2O  Resp: (!) 32  .    Anticoagulation is heparin and is running at 1500 u/hr      Intake/Output Summary (Last 24 hours) at 7/20/2022 1545  Last data filed at 7/20/2022 1500  Gross per 24 hour   Intake 4698.26 ml   Output 2499 ml   Net 2199.26 ml         Plan is to remain on RVAD and possible decannulation soon.      Brenda De La Cruz, RT  ECMO Specialist  7/20/2022 3:45 PM

## 2022-07-20 NOTE — PROGRESS NOTES
[facility]    Cardiology Progress Note- Cards 2        Date of Admission:  6/17/2022     Assessment & Plan: HVSL   Dandy EDUARD Sands is a 61 year old male with PMH of lupus, antiphospholipid syndrome, HTN, HLD, HFrEF 2/2 ICM who presented with cardiogenic shock c/b multiorgan failure (JOSE, shock liver) requiring mechanical support with IABP, now s/p HM3 LVAD 7/8/22 by Dr. Zamora with intraoperative course c/b RV failure s/p 29F Protek duo via RIJ. Post op course c/b hemopericardium s/p repeat washout with chest left open. Chest now closed 7/13/22     #HFrEF 2/2 ICM s/p HM3 LVAD in setting of cardiogenic shock (SCAI D)  #RV failure s/p Protek duo temporary RVAD  #Post chest closure hemopericardium s/p repeat washout  Patient with ICM s/p HM3 LVAD 7/8/22 by Dr. Zamora in setting of presentation for cardiogenic shock requiring MCS with IABP as prior to HM (initially evaluated for heart transplant, however noted to have substantially elevated DSAs during course of care, so decision made to proceed with LVAD given patient already on temporary MCS). Intraoperative course c/b RV failure resulting in cardiogenic shock requiring high dose pressors necessitating RVAD support. Chest closed 7/11 and Ashli weaned. However developed tachycardia, lactic acidosis and decreased hemoglobin with CT scan noting hemopericardium. Returned to OR where underwent washout with large clot burden noted over the right atrium and around LVAD outflow graft. Chest left open and returned to ICU where pressors were able to be weaned. Noted to have stable RVAD/LVAD flows throughout and post procedurally. Ultimately returned to OR for repeat closure. Currently hemodynamically stable with stable end-organ function and hemoglobin. Currently attempting PSTs but with difficulty with mental status. Weaning RVAD support   -LVAD: continues to have good flows with few alarms with rare PI event alarms; will  continue at current speed and monitor for alarms  -RVAD: no evidence of increasing hemolysis, flows appear balanced, oxygenating well --> will discuss threshold for decannulation with surgical team  -AC, antiplatelets: per surgical service; has had some post-operative bleeding and prior hemopericardium; will need to monitor coags closely in setting of heparin and ASA use  -Volume status: appears euvolemic; will monitor closely to ensure not add'l volume accumulation  -rhythm: noted for atrial fibrillation; continues on amiodarone   -of note, patient with climbing capture threshold of RV lead; will need to be evaluated in future by EP team  -hypertension: continue hydralazine 25 mg q8h      The patient's care was discussed with the Attending Physician, Dr. Lora, Bedside Nurse and Primary team.    Emelyn Meneses MD  [facility]    ______________________________________________________________________    Interval History   Nursing notes reviewed. BRAXTON. This AM, following commands intermittently    Data reviewed today: I reviewed all medications, new labs and imaging results over the last 24 hours    Physical Exam   Vital Signs: Temp: 98.2  F (36.8  C) Temp src: Esophageal BP: (!) 79/62 Pulse: 75   Resp: 17 SpO2: 100 % O2 Device: Mechanical Ventilator    Weight: 182 lbs 15.71 oz  General Appearance: Older male in NAD, ett in place  Respiratory: ventilated lung sounds, clear anterior breath sounds  Cardiovascular: vad hum, driveline site c/d/i, protek in place, chest incisions c/d/i, JVD ~10 cm  GI: soft, bs active   Skin: warm, well perfused, 1+ pitting edema to the mid-tibia  Neuro: sedated, intubated    Data   Recent Labs   Lab 07/20/22  0404 07/20/22  0355 07/20/22  0023 07/19/22  2049 07/19/22  1941 07/19/22  1601 07/19/22  0331 07/19/22  0322 07/18/22  2003 07/18/22  1526   WBC  --  13.9*  --  22.4*  --  20.1*   < > 15.5*   < > 16.7*   HGB  --  8.1*  --  8.2*  --   8.2*   < > 7.0*   < > 7.7*   MCV  --  90  --  90  --  89   < > 92   < > 92   PLT  --  157  --  200  --  174   < > 155   < > 171   INR  --  1.35*  --  1.31*  --  1.30*   < > 1.35*   < > 1.31*   NA  --  140  --  140  --  141   < > 141   < > 141   POTASSIUM  --  3.8  --  4.8  --  4.1   < > 3.6   < > 3.7   CHLORIDE  --  105  --  107  --  106   < > 106   < > 106   CO2  --  24  --  21*  --  24   < > 27   < > 25   BUN  --  25.6*  --  27.0*  --  25.7*   < > 24.2*   < > 25.7*   CR  --  0.67  --  0.69  --  0.68   < > 0.65*   < > 0.64*   ANIONGAP  --  11  --  12  --  11   < > 8   < > 10   ELDA  --  8.0*  --  8.4*  --  8.3*   < > 8.1*   < > 7.7*   * 125* 111* 151*   < > 134*   < > 114*   < > 120*   ALBUMIN  --  2.7*  --   --   --   --   --  2.5*  --  2.3*   PROTTOTAL  --   --   --   --   --   --   --  5.0*  --  5.2*   BILITOTAL  --   --   --   --   --   --   --  0.6  --  0.7   ALKPHOS  --   --   --   --   --   --   --  146*  --  187*   ALT  --   --   --   --   --   --   --  11  --  12   AST  --   --   --   --   --   --   --  24  --  23    < > = values in this interval not displayed.       Medications     amiodarone 0.5 mg/min (07/20/22 0600)     dexmedetomidine 1.2 mcg/kg/hr (07/20/22 0600)     dextrose       fentaNYL 25 mcg/hr (07/20/22 0600)     heparin 1,500 Units/hr (07/20/22 0600)     propofol (DIPRIVAN) infusion 10 mcg/kg/min (07/20/22 0618)       acetaminophen  975 mg Oral or Feeding Tube Q8H CAMMY     aspirin  81 mg Oral or Feeding Tube Daily     DULoxetine  30 mg Oral Daily     fiber modular (NUTRISOURCE FIBER)  1 packet Per Feeding Tube BID     fluticasone-salmeterol  2 puff Inhalation BID     hydrALAZINE  25 mg Oral or Feeding Tube Q8H CAMMY     hydroxychloroquine  200 mg Oral BID     insulin aspart  1-12 Units Subcutaneous Q4H     insulin glargine  20 Units Subcutaneous QAM AC     melatonin  10 mg Oral QPM     multivitamins w/minerals  15 mL Per Feeding Tube Daily     oxyCODONE  10 mg Oral or Feeding Tube Q4H      pantoprazole  40 mg Per Feeding Tube QAM AC     piperacillin-tazobactam  4.5 g Intravenous Q6H     polyethylene glycol  17 g Oral or Feeding Tube Daily     protein modular  1 packet Per Feeding Tube TID     QUEtiapine  100 mg Oral or Feeding Tube At Bedtime     senna-docusate  1 tablet Oral or Feeding Tube BID     sodium chloride (PF)  3 mL Intracatheter Q8H     sodium chloride (PF)  3 mL Intracatheter Q8H       TTE 7/17/2022:  HM3 LVAD at 5200 RPM and Protek Duo RVAD at 4.3L/min.  Normal LV size with severely reduced global LV function, LVEF<20%. LVIDd= 4.6  cm.  Normal RV size and mildly reduced RV function, RVFAC 31%. The ventricular  septum is midline.  The aortic valve opens minimally with each beat. There is no AI.  LVAD inflow cannula is visualized in the LV apex. Normal Doppler interrogation  of the LVAD inflow cannula.  LVAD outflow graft is not well assessed.  Organized material is present in the pericardial space, most prominently  anterior to the RV free wall. There is no evidence of pericardial tamponade.  This was also present on the 7/12/22 study, but is better imaged on today's  study.     This study was compared with the study from 7/12/22: Image quality is  significantly improved. There has otherwise been no significant change,  however the prior study was extremely limited.

## 2022-07-20 NOTE — PROGRESS NOTES
VAD Shift Summary: [id number]      VAD Equipment:  Primary console serial number: [id number]  Backup console serial number: [id number]  Circuit lot number: [id number]  Pump head lot number: [id number]      Patient remains on RVAD all equipment is functioning and alarms are appropriately set. RPM's 3550 with flow range 1.98-2.0 L/min.. Circuit remains free of air, clot and fibrin throughout the circuit. . Cannulas are secure with no bleeding from site. Extremities are cool. Backup console on, emergency change out supplies at bedside (including spare pump head, sterile scissors, 2 connectors).     Significant Shift Events: RVAD flows dropped to 1.2. RPMS increased to 3550 to maintain ordered flows. Circuit has visible clot and fibrin in spots.     Vent settings:  Vent Mode: CMV/AC  (Continuous Mandatory Ventilation/ Assist Control)  FiO2 (%): 35 %  Resp Rate (Set): 12 breaths/min  Tidal Volume (Set, mL): 450 mL  PEEP (cm H2O): 5 cmH2O  Pressure Support (cm H2O): 7 cmH2O  Resp: 16  .     ACT range 190.    Urine output is as charted but dropping, blood loss was none. Product given included PRBC.       Intake/Output Summary (Last 24 hours) at 7/20/2022 0448  Last data filed at 7/20/2022 0400  Gross per 24 hour   Intake 5035.85 ml   Output 1865 ml   Net 3170.85 ml       ECHO:  No results found for this or any previous visit.  No results found for this or any previous visit.      CXR:  Recent Results (from the past 24 hour(s))   XR Chest Port 1 View    Impression    RESIDENT PRELIMINARY INTERPRETATION  IMPRESSION:   1. Supportive devices in similar positioning.  2. Slightly worsening bilateral mixed pulmonary opacities.       Labs:  Recent Labs   Lab 07/20/22  0356 07/20/22  0014 07/20/22  0013 07/19/22  2049 07/19/22  1933 07/19/22  1601   PH 7.46*  --  7.47*  --  7.49* 7.49*   PCO2 37  --  36  --  34* 34*   PO2 136*  --  116*  --  103 153*   HCO3 26  --  26  --  26 26   O2PER 50 50 50 35 35 35       Lab Results    Component Value Date    HGB 8.1 (L) 07/20/2022    PHGB 30 (H) 07/19/2022     07/20/2022    FIBR 530 (H) 07/19/2022    INR 1.31 (H) 07/19/2022     (HH) 07/19/2022    DD 6.90 (H) 07/19/2022    ANTCH 84 (L) 07/19/2022         Plan is continue with RVAD support.      Anne Gross, RT  ECMO Specialist  7/20/2022 4:48 AM

## 2022-07-20 NOTE — PROGRESS NOTES
Respiratory Care:    Patient extubated successfully after 4 hrs CPAP/PS 7/5 without complications to HFNC 60L/100%. Fair loose cough post extubation with stable VS. Continue monitor with current POC.    Eugene Canela, RT

## 2022-07-21 ENCOUNTER — APPOINTMENT (OUTPATIENT)
Dept: SPEECH THERAPY | Facility: CLINIC | Age: 62
DRG: 001 | End: 2022-07-21
Attending: INTERNAL MEDICINE
Payer: COMMERCIAL

## 2022-07-21 ENCOUNTER — APPOINTMENT (OUTPATIENT)
Dept: GENERAL RADIOLOGY | Facility: CLINIC | Age: 62
DRG: 001 | End: 2022-07-21
Attending: STUDENT IN AN ORGANIZED HEALTH CARE EDUCATION/TRAINING PROGRAM
Payer: COMMERCIAL

## 2022-07-21 ENCOUNTER — APPOINTMENT (OUTPATIENT)
Dept: OCCUPATIONAL THERAPY | Facility: CLINIC | Age: 62
DRG: 001 | End: 2022-07-21
Attending: INTERNAL MEDICINE
Payer: COMMERCIAL

## 2022-07-21 LAB
ABO/RH(D): NORMAL
ALBUMIN SERPL BCG-MCNC: 2.7 G/DL (ref 3.5–5.2)
ALBUMIN SERPL BCG-MCNC: 2.7 G/DL (ref 3.5–5.2)
ALBUMIN UR-MCNC: 50 MG/DL
ALP SERPL-CCNC: 145 U/L (ref 40–129)
ALP SERPL-CCNC: 155 U/L (ref 40–129)
ALT SERPL W P-5'-P-CCNC: 11 U/L (ref 10–50)
ALT SERPL W P-5'-P-CCNC: 12 U/L (ref 10–50)
ANION GAP SERPL CALCULATED.3IONS-SCNC: 10 MMOL/L (ref 7–15)
ANION GAP SERPL CALCULATED.3IONS-SCNC: 10 MMOL/L (ref 7–15)
ANION GAP SERPL CALCULATED.3IONS-SCNC: 11 MMOL/L (ref 7–15)
ANION GAP SERPL CALCULATED.3IONS-SCNC: 12 MMOL/L (ref 7–15)
ANION GAP SERPL CALCULATED.3IONS-SCNC: 13 MMOL/L (ref 7–15)
ANTIBODY SCREEN: NEGATIVE
APPEARANCE UR: CLEAR
APTT PPP: 207 SECONDS (ref 22–38)
APTT PPP: 59 SECONDS (ref 22–38)
APTT PPP: 61 SECONDS (ref 22–38)
APTT PPP: 65 SECONDS (ref 22–38)
AST SERPL W P-5'-P-CCNC: 27 U/L (ref 10–50)
AST SERPL W P-5'-P-CCNC: 32 U/L (ref 10–50)
AT III ACT/NOR PPP CHRO: 83 % (ref 85–135)
BACTERIA BLD CULT: NO GROWTH
BASE EXCESS BLDA CALC-SCNC: 0.6 MMOL/L (ref -9–1.8)
BASE EXCESS BLDA CALC-SCNC: 1.1 MMOL/L (ref -9–1.8)
BASE EXCESS BLDA CALC-SCNC: 1.2 MMOL/L (ref -9–1.8)
BASE EXCESS BLDA CALC-SCNC: 1.4 MMOL/L (ref -9–1.8)
BASE EXCESS BLDA CALC-SCNC: 2.1 MMOL/L (ref -9–1.8)
BASE EXCESS BLDA CALC-SCNC: 2.5 MMOL/L (ref -9–1.8)
BASE EXCESS BLDA CALC-SCNC: 2.6 MMOL/L (ref -9–1.8)
BASE EXCESS BLDA CALC-SCNC: 3.4 MMOL/L (ref -9–1.8)
BASOPHILS # BLD AUTO: 0.1 10E3/UL (ref 0–0.2)
BASOPHILS NFR BLD AUTO: 0 %
BASOPHILS NFR BLD AUTO: 1 %
BILIRUB DIRECT SERPL-MCNC: 0.4 MG/DL (ref 0–0.3)
BILIRUB SERPL-MCNC: 0.7 MG/DL
BILIRUB SERPL-MCNC: 0.8 MG/DL
BILIRUB UR QL STRIP: NEGATIVE
BUN SERPL-MCNC: 24.5 MG/DL (ref 8–23)
BUN SERPL-MCNC: 25 MG/DL (ref 8–23)
BUN SERPL-MCNC: 25 MG/DL (ref 8–23)
BUN SERPL-MCNC: 25.9 MG/DL (ref 8–23)
BUN SERPL-MCNC: 27.1 MG/DL (ref 8–23)
CA-I BLD-MCNC: 4.4 MG/DL (ref 4.4–5.2)
CA-I BLD-MCNC: 4.4 MG/DL (ref 4.4–5.2)
CA-I BLD-MCNC: 4.5 MG/DL (ref 4.4–5.2)
CA-I BLD-MCNC: 4.6 MG/DL (ref 4.4–5.2)
CALCIUM SERPL-MCNC: 8.1 MG/DL (ref 8.8–10.2)
CALCIUM SERPL-MCNC: 8.1 MG/DL (ref 8.8–10.2)
CALCIUM SERPL-MCNC: 8.2 MG/DL (ref 8.8–10.2)
CALCIUM SERPL-MCNC: 8.2 MG/DL (ref 8.8–10.2)
CALCIUM SERPL-MCNC: 8.5 MG/DL (ref 8.8–10.2)
CHLORIDE SERPL-SCNC: 102 MMOL/L (ref 98–107)
CHLORIDE SERPL-SCNC: 102 MMOL/L (ref 98–107)
CHLORIDE SERPL-SCNC: 104 MMOL/L (ref 98–107)
CHLORIDE SERPL-SCNC: 105 MMOL/L (ref 98–107)
CHLORIDE SERPL-SCNC: 106 MMOL/L (ref 98–107)
COLOR UR AUTO: YELLOW
CREAT SERPL-MCNC: 0.7 MG/DL (ref 0.67–1.17)
CREAT SERPL-MCNC: 0.7 MG/DL (ref 0.67–1.17)
CREAT SERPL-MCNC: 0.72 MG/DL (ref 0.67–1.17)
CREAT SERPL-MCNC: 0.72 MG/DL (ref 0.67–1.17)
CREAT SERPL-MCNC: 0.74 MG/DL (ref 0.67–1.17)
D DIMER PPP FEU-MCNC: 5.71 UG/ML FEU (ref 0–0.5)
D DIMER PPP FEU-MCNC: 6.85 UG/ML FEU (ref 0–0.5)
DEPRECATED HCO3 PLAS-SCNC: 23 MMOL/L (ref 22–29)
DEPRECATED HCO3 PLAS-SCNC: 23 MMOL/L (ref 22–29)
DEPRECATED HCO3 PLAS-SCNC: 24 MMOL/L (ref 22–29)
DEPRECATED HCO3 PLAS-SCNC: 26 MMOL/L (ref 22–29)
DEPRECATED HCO3 PLAS-SCNC: 26 MMOL/L (ref 22–29)
EOSINOPHIL # BLD AUTO: 0.1 10E3/UL (ref 0–0.7)
EOSINOPHIL # BLD AUTO: 0.2 10E3/UL (ref 0–0.7)
EOSINOPHIL NFR BLD AUTO: 1 %
ERYTHROCYTE [DISTWIDTH] IN BLOOD BY AUTOMATED COUNT: 19.6 % (ref 10–15)
ERYTHROCYTE [DISTWIDTH] IN BLOOD BY AUTOMATED COUNT: 19.6 % (ref 10–15)
ERYTHROCYTE [DISTWIDTH] IN BLOOD BY AUTOMATED COUNT: 19.7 % (ref 10–15)
ERYTHROCYTE [DISTWIDTH] IN BLOOD BY AUTOMATED COUNT: 19.7 % (ref 10–15)
ERYTHROCYTE [DISTWIDTH] IN BLOOD BY AUTOMATED COUNT: 19.8 % (ref 10–15)
FIBRINOGEN PPP-MCNC: 513 MG/DL (ref 170–490)
FIBRINOGEN PPP-MCNC: 514 MG/DL (ref 170–490)
FIBRINOGEN PPP-MCNC: 539 MG/DL (ref 170–490)
GFR SERPL CREATININE-BSD FRML MDRD: >90 ML/MIN/1.73M2
GLUCOSE BLDC GLUCOMTR-MCNC: 120 MG/DL (ref 70–99)
GLUCOSE BLDC GLUCOMTR-MCNC: 123 MG/DL (ref 70–99)
GLUCOSE BLDC GLUCOMTR-MCNC: 140 MG/DL (ref 70–99)
GLUCOSE BLDC GLUCOMTR-MCNC: 144 MG/DL (ref 70–99)
GLUCOSE BLDC GLUCOMTR-MCNC: 150 MG/DL (ref 70–99)
GLUCOSE SERPL-MCNC: 119 MG/DL (ref 70–99)
GLUCOSE SERPL-MCNC: 126 MG/DL (ref 70–99)
GLUCOSE SERPL-MCNC: 126 MG/DL (ref 70–99)
GLUCOSE SERPL-MCNC: 157 MG/DL (ref 70–99)
GLUCOSE SERPL-MCNC: 168 MG/DL (ref 70–99)
GLUCOSE UR STRIP-MCNC: NEGATIVE MG/DL
HCO3 BLD-SCNC: 25 MMOL/L (ref 21–28)
HCO3 BLD-SCNC: 26 MMOL/L (ref 21–28)
HCO3 BLD-SCNC: 26 MMOL/L (ref 21–28)
HCO3 BLD-SCNC: 27 MMOL/L (ref 21–28)
HCO3 BLD-SCNC: 27 MMOL/L (ref 21–28)
HCT VFR BLD AUTO: 24 % (ref 40–53)
HCT VFR BLD AUTO: 25.6 % (ref 40–53)
HCT VFR BLD AUTO: 26.4 % (ref 40–53)
HCT VFR BLD AUTO: 26.5 % (ref 40–53)
HCT VFR BLD AUTO: 26.5 % (ref 40–53)
HGB BLD-MCNC: 7.7 G/DL (ref 13.3–17.7)
HGB BLD-MCNC: 8.2 G/DL (ref 13.3–17.7)
HGB BLD-MCNC: 8.2 G/DL (ref 13.3–17.7)
HGB BLD-MCNC: 8.4 G/DL (ref 13.3–17.7)
HGB BLD-MCNC: 8.4 G/DL (ref 13.3–17.7)
HGB FREE PLAS-MCNC: <30 MG/DL
HGB UR QL STRIP: NEGATIVE
HYALINE CASTS: 1 /LPF
IMM GRANULOCYTES # BLD: 0.4 10E3/UL
IMM GRANULOCYTES # BLD: 0.5 10E3/UL
IMM GRANULOCYTES # BLD: 0.6 10E3/UL
IMM GRANULOCYTES # BLD: 0.7 10E3/UL
IMM GRANULOCYTES # BLD: 0.7 10E3/UL
IMM GRANULOCYTES NFR BLD: 3 %
IMM GRANULOCYTES NFR BLD: 3 %
IMM GRANULOCYTES NFR BLD: 4 %
INR PPP: 1.26 (ref 0.85–1.15)
INR PPP: 1.29 (ref 0.85–1.15)
INR PPP: 1.31 (ref 0.85–1.15)
INR PPP: 1.34 (ref 0.85–1.15)
KETONES UR STRIP-MCNC: ABNORMAL MG/DL
LACTATE SERPL-SCNC: 2 MMOL/L (ref 0.7–2)
LACTATE SERPL-SCNC: 2 MMOL/L (ref 0.7–2)
LACTATE SERPL-SCNC: 2.3 MMOL/L (ref 0.7–2)
LACTATE SERPL-SCNC: 2.5 MMOL/L (ref 0.7–2)
LACTATE SERPL-SCNC: 2.5 MMOL/L (ref 0.7–2)
LEUKOCYTE ESTERASE UR QL STRIP: NEGATIVE
LYMPHOCYTES # BLD AUTO: 1.2 10E3/UL (ref 0.8–5.3)
LYMPHOCYTES # BLD AUTO: 1.4 10E3/UL (ref 0.8–5.3)
LYMPHOCYTES # BLD AUTO: 1.6 10E3/UL (ref 0.8–5.3)
LYMPHOCYTES NFR BLD AUTO: 10 %
LYMPHOCYTES NFR BLD AUTO: 10 %
LYMPHOCYTES NFR BLD AUTO: 7 %
LYMPHOCYTES NFR BLD AUTO: 8 %
LYMPHOCYTES NFR BLD AUTO: 9 %
MAGNESIUM SERPL-MCNC: 2.1 MG/DL (ref 1.7–2.3)
MCH RBC QN AUTO: 28.3 PG (ref 26.5–33)
MCH RBC QN AUTO: 28.4 PG (ref 26.5–33)
MCH RBC QN AUTO: 28.7 PG (ref 26.5–33)
MCH RBC QN AUTO: 28.9 PG (ref 26.5–33)
MCH RBC QN AUTO: 29.1 PG (ref 26.5–33)
MCHC RBC AUTO-ENTMCNC: 30.9 G/DL (ref 31.5–36.5)
MCHC RBC AUTO-ENTMCNC: 31.7 G/DL (ref 31.5–36.5)
MCHC RBC AUTO-ENTMCNC: 31.8 G/DL (ref 31.5–36.5)
MCHC RBC AUTO-ENTMCNC: 32 G/DL (ref 31.5–36.5)
MCHC RBC AUTO-ENTMCNC: 32.1 G/DL (ref 31.5–36.5)
MCV RBC AUTO: 89 FL (ref 78–100)
MCV RBC AUTO: 90 FL (ref 78–100)
MCV RBC AUTO: 91 FL (ref 78–100)
MONOCYTES # BLD AUTO: 1.1 10E3/UL (ref 0–1.3)
MONOCYTES # BLD AUTO: 1.1 10E3/UL (ref 0–1.3)
MONOCYTES # BLD AUTO: 1.2 10E3/UL (ref 0–1.3)
MONOCYTES # BLD AUTO: 1.4 10E3/UL (ref 0–1.3)
MONOCYTES # BLD AUTO: 1.4 10E3/UL (ref 0–1.3)
MONOCYTES NFR BLD AUTO: 7 %
MONOCYTES NFR BLD AUTO: 8 %
MONOCYTES NFR BLD AUTO: 9 %
NEUTROPHILS # BLD AUTO: 11.4 10E3/UL (ref 1.6–8.3)
NEUTROPHILS # BLD AUTO: 12.2 10E3/UL (ref 1.6–8.3)
NEUTROPHILS # BLD AUTO: 12.7 10E3/UL (ref 1.6–8.3)
NEUTROPHILS # BLD AUTO: 13.6 10E3/UL (ref 1.6–8.3)
NEUTROPHILS # BLD AUTO: 14.3 10E3/UL (ref 1.6–8.3)
NEUTROPHILS NFR BLD AUTO: 76 %
NEUTROPHILS NFR BLD AUTO: 78 %
NEUTROPHILS NFR BLD AUTO: 79 %
NEUTROPHILS NFR BLD AUTO: 79 %
NEUTROPHILS NFR BLD AUTO: 82 %
NITRATE UR QL: NEGATIVE
NRBC # BLD AUTO: 0 10E3/UL
NRBC # BLD AUTO: 0.1 10E3/UL
NRBC # BLD AUTO: 0.1 10E3/UL
NRBC BLD AUTO-RTO: 0 /100
O2/TOTAL GAS SETTING VFR VENT: 100 %
O2/TOTAL GAS SETTING VFR VENT: 35 %
OXYHGB MFR BLD: 96 % (ref 92–100)
OXYHGB MFR BLD: 97 % (ref 92–100)
OXYHGB MFR BLD: 98 % (ref 92–100)
PCO2 BLD: 35 MM HG (ref 35–45)
PCO2 BLD: 36 MM HG (ref 35–45)
PCO2 BLD: 37 MM HG (ref 35–45)
PCO2 BLD: 38 MM HG (ref 35–45)
PH BLD: 7.44 [PH] (ref 7.35–7.45)
PH BLD: 7.45 [PH] (ref 7.35–7.45)
PH BLD: 7.47 [PH] (ref 7.35–7.45)
PH BLD: 7.49 [PH] (ref 7.35–7.45)
PH BLD: 7.49 [PH] (ref 7.35–7.45)
PH UR STRIP: 5.5 [PH] (ref 5–7)
PHOSPHATE SERPL-MCNC: 3.7 MG/DL (ref 2.5–4.5)
PLATELET # BLD AUTO: 185 10E3/UL (ref 150–450)
PLATELET # BLD AUTO: 191 10E3/UL (ref 150–450)
PLATELET # BLD AUTO: 235 10E3/UL (ref 150–450)
PLATELET # BLD AUTO: 238 10E3/UL (ref 150–450)
PLATELET # BLD AUTO: 253 10E3/UL (ref 150–450)
PO2 BLD: 107 MM HG (ref 80–105)
PO2 BLD: 109 MM HG (ref 80–105)
PO2 BLD: 117 MM HG (ref 80–105)
PO2 BLD: 128 MM HG (ref 80–105)
PO2 BLD: 187 MM HG (ref 80–105)
PO2 BLD: 89 MM HG (ref 80–105)
PO2 BLD: 90 MM HG (ref 80–105)
PO2 BLD: 97 MM HG (ref 80–105)
POTASSIUM SERPL-SCNC: 3.9 MMOL/L (ref 3.4–5.3)
POTASSIUM SERPL-SCNC: 4.1 MMOL/L (ref 3.4–5.3)
POTASSIUM SERPL-SCNC: 4.1 MMOL/L (ref 3.4–5.3)
PROT SERPL-MCNC: 5.5 G/DL (ref 6.4–8.3)
PROT SERPL-MCNC: 5.6 G/DL (ref 6.4–8.3)
RBC # BLD AUTO: 2.65 10E6/UL (ref 4.4–5.9)
RBC # BLD AUTO: 2.89 10E6/UL (ref 4.4–5.9)
RBC # BLD AUTO: 2.9 10E6/UL (ref 4.4–5.9)
RBC # BLD AUTO: 2.91 10E6/UL (ref 4.4–5.9)
RBC # BLD AUTO: 2.93 10E6/UL (ref 4.4–5.9)
RBC URINE: 4 /HPF
SODIUM SERPL-SCNC: 138 MMOL/L (ref 136–145)
SODIUM SERPL-SCNC: 138 MMOL/L (ref 136–145)
SODIUM SERPL-SCNC: 139 MMOL/L (ref 136–145)
SODIUM SERPL-SCNC: 141 MMOL/L (ref 136–145)
SODIUM SERPL-SCNC: 141 MMOL/L (ref 136–145)
SP GR UR STRIP: 1.03 (ref 1–1.03)
SPECIMEN EXPIRATION DATE: NORMAL
TRIGL SERPL-MCNC: 110 MG/DL
UFH PPP CHRO-ACNC: 0.48 IU/ML
UROBILINOGEN UR STRIP-MCNC: NORMAL MG/DL
WBC # BLD AUTO: 15.1 10E3/UL (ref 4–11)
WBC # BLD AUTO: 15.8 10E3/UL (ref 4–11)
WBC # BLD AUTO: 16.4 10E3/UL (ref 4–11)
WBC # BLD AUTO: 17.2 10E3/UL (ref 4–11)
WBC # BLD AUTO: 17.3 10E3/UL (ref 4–11)
WBC URINE: 1 /HPF

## 2022-07-21 PROCEDURE — 83605 ASSAY OF LACTIC ACID: CPT | Performed by: SURGERY

## 2022-07-21 PROCEDURE — 250N000011 HC RX IP 250 OP 636: Performed by: INTERNAL MEDICINE

## 2022-07-21 PROCEDURE — 36415 COLL VENOUS BLD VENIPUNCTURE: CPT | Performed by: SURGERY

## 2022-07-21 PROCEDURE — 82805 BLOOD GASES W/O2 SATURATION: CPT | Performed by: SURGERY

## 2022-07-21 PROCEDURE — 85300 ANTITHROMBIN III ACTIVITY: CPT | Performed by: SURGERY

## 2022-07-21 PROCEDURE — 92526 ORAL FUNCTION THERAPY: CPT | Mod: GN

## 2022-07-21 PROCEDURE — 85520 HEPARIN ASSAY: CPT | Performed by: SURGERY

## 2022-07-21 PROCEDURE — 85610 PROTHROMBIN TIME: CPT | Performed by: STUDENT IN AN ORGANIZED HEALTH CARE EDUCATION/TRAINING PROGRAM

## 2022-07-21 PROCEDURE — 87040 BLOOD CULTURE FOR BACTERIA: CPT | Performed by: STUDENT IN AN ORGANIZED HEALTH CARE EDUCATION/TRAINING PROGRAM

## 2022-07-21 PROCEDURE — 93010 ELECTROCARDIOGRAM REPORT: CPT | Performed by: INTERNAL MEDICINE

## 2022-07-21 PROCEDURE — 250N000011 HC RX IP 250 OP 636: Performed by: SURGERY

## 2022-07-21 PROCEDURE — 250N000013 HC RX MED GY IP 250 OP 250 PS 637: Performed by: NURSE PRACTITIONER

## 2022-07-21 PROCEDURE — 85730 THROMBOPLASTIN TIME PARTIAL: CPT | Performed by: SURGERY

## 2022-07-21 PROCEDURE — 250N000013 HC RX MED GY IP 250 OP 250 PS 637: Performed by: STUDENT IN AN ORGANIZED HEALTH CARE EDUCATION/TRAINING PROGRAM

## 2022-07-21 PROCEDURE — 92610 EVALUATE SWALLOWING FUNCTION: CPT | Mod: GN

## 2022-07-21 PROCEDURE — 250N000011 HC RX IP 250 OP 636: Performed by: THORACIC SURGERY (CARDIOTHORACIC VASCULAR SURGERY)

## 2022-07-21 PROCEDURE — 250N000009 HC RX 250

## 2022-07-21 PROCEDURE — 71045 X-RAY EXAM CHEST 1 VIEW: CPT

## 2022-07-21 PROCEDURE — 82330 ASSAY OF CALCIUM: CPT | Performed by: SURGERY

## 2022-07-21 PROCEDURE — 86850 RBC ANTIBODY SCREEN: CPT | Performed by: STUDENT IN AN ORGANIZED HEALTH CARE EDUCATION/TRAINING PROGRAM

## 2022-07-21 PROCEDURE — 97530 THERAPEUTIC ACTIVITIES: CPT | Mod: GO

## 2022-07-21 PROCEDURE — 999N000157 HC STATISTIC RCP TIME EA 10 MIN

## 2022-07-21 PROCEDURE — P9041 ALBUMIN (HUMAN),5%, 50ML: HCPCS | Performed by: SURGERY

## 2022-07-21 PROCEDURE — 93005 ELECTROCARDIOGRAM TRACING: CPT

## 2022-07-21 PROCEDURE — 250N000013 HC RX MED GY IP 250 OP 250 PS 637: Performed by: ANESTHESIOLOGY

## 2022-07-21 PROCEDURE — 82374 ASSAY BLOOD CARBON DIOXIDE: CPT | Performed by: SURGERY

## 2022-07-21 PROCEDURE — P9041 ALBUMIN (HUMAN),5%, 50ML: HCPCS | Performed by: STUDENT IN AN ORGANIZED HEALTH CARE EDUCATION/TRAINING PROGRAM

## 2022-07-21 PROCEDURE — 85610 PROTHROMBIN TIME: CPT | Performed by: SURGERY

## 2022-07-21 PROCEDURE — 85384 FIBRINOGEN ACTIVITY: CPT | Performed by: STUDENT IN AN ORGANIZED HEALTH CARE EDUCATION/TRAINING PROGRAM

## 2022-07-21 PROCEDURE — 250N000009 HC RX 250: Performed by: STUDENT IN AN ORGANIZED HEALTH CARE EDUCATION/TRAINING PROGRAM

## 2022-07-21 PROCEDURE — 999N000043 HC STATISTIC CTO2 CONT OXYGEN TECH TIME EA 90 MIN

## 2022-07-21 PROCEDURE — 85014 HEMATOCRIT: CPT | Performed by: SURGERY

## 2022-07-21 PROCEDURE — 85379 FIBRIN DEGRADATION QUANT: CPT | Performed by: SURGERY

## 2022-07-21 PROCEDURE — 99291 CRITICAL CARE FIRST HOUR: CPT | Mod: 25 | Performed by: INTERNAL MEDICINE

## 2022-07-21 PROCEDURE — 71045 X-RAY EXAM CHEST 1 VIEW: CPT | Mod: 26 | Performed by: RADIOLOGY

## 2022-07-21 PROCEDURE — 250N000011 HC RX IP 250 OP 636: Performed by: STUDENT IN AN ORGANIZED HEALTH CARE EDUCATION/TRAINING PROGRAM

## 2022-07-21 PROCEDURE — 250N000013 HC RX MED GY IP 250 OP 250 PS 637: Performed by: SURGERY

## 2022-07-21 PROCEDURE — 85025 COMPLETE CBC W/AUTO DIFF WBC: CPT | Performed by: SURGERY

## 2022-07-21 PROCEDURE — 87040 BLOOD CULTURE FOR BACTERIA: CPT | Performed by: SURGERY

## 2022-07-21 PROCEDURE — 94640 AIRWAY INHALATION TREATMENT: CPT | Mod: 76

## 2022-07-21 PROCEDURE — 82805 BLOOD GASES W/O2 SATURATION: CPT | Performed by: STUDENT IN AN ORGANIZED HEALTH CARE EDUCATION/TRAINING PROGRAM

## 2022-07-21 PROCEDURE — 86901 BLOOD TYPING SEROLOGIC RH(D): CPT | Performed by: STUDENT IN AN ORGANIZED HEALTH CARE EDUCATION/TRAINING PROGRAM

## 2022-07-21 PROCEDURE — 999N000215 HC STATISTIC HFNC ADULT NON-CPAP

## 2022-07-21 PROCEDURE — 85384 FIBRINOGEN ACTIVITY: CPT | Performed by: SURGERY

## 2022-07-21 PROCEDURE — 82805 BLOOD GASES W/O2 SATURATION: CPT | Performed by: ANESTHESIOLOGY

## 2022-07-21 PROCEDURE — 83735 ASSAY OF MAGNESIUM: CPT | Performed by: SURGERY

## 2022-07-21 PROCEDURE — 83051 HEMOGLOBIN PLASMA: CPT | Performed by: SURGERY

## 2022-07-21 PROCEDURE — 250N000013 HC RX MED GY IP 250 OP 250 PS 637: Performed by: INTERNAL MEDICINE

## 2022-07-21 PROCEDURE — 250N000011 HC RX IP 250 OP 636: Performed by: ANESTHESIOLOGY

## 2022-07-21 PROCEDURE — 84155 ASSAY OF PROTEIN SERUM: CPT | Performed by: STUDENT IN AN ORGANIZED HEALTH CARE EDUCATION/TRAINING PROGRAM

## 2022-07-21 PROCEDURE — 81001 URINALYSIS AUTO W/SCOPE: CPT | Performed by: STUDENT IN AN ORGANIZED HEALTH CARE EDUCATION/TRAINING PROGRAM

## 2022-07-21 PROCEDURE — 97168 OT RE-EVAL EST PLAN CARE: CPT | Mod: GO

## 2022-07-21 PROCEDURE — 250N000011 HC RX IP 250 OP 636

## 2022-07-21 PROCEDURE — 82248 BILIRUBIN DIRECT: CPT | Performed by: NURSE PRACTITIONER

## 2022-07-21 PROCEDURE — 71045 X-RAY EXAM CHEST 1 VIEW: CPT | Mod: 77

## 2022-07-21 PROCEDURE — 82310 ASSAY OF CALCIUM: CPT | Performed by: STUDENT IN AN ORGANIZED HEALTH CARE EDUCATION/TRAINING PROGRAM

## 2022-07-21 PROCEDURE — 84478 ASSAY OF TRIGLYCERIDES: CPT | Performed by: THORACIC SURGERY (CARDIOTHORACIC VASCULAR SURGERY)

## 2022-07-21 PROCEDURE — 84100 ASSAY OF PHOSPHORUS: CPT | Performed by: SURGERY

## 2022-07-21 PROCEDURE — 200N000002 HC R&B ICU UMMC

## 2022-07-21 PROCEDURE — 93750 INTERROGATION VAD IN PERSON: CPT | Performed by: INTERNAL MEDICINE

## 2022-07-21 RX ORDER — LIDOCAINE HYDROCHLORIDE 10 MG/ML
INJECTION, SOLUTION EPIDURAL; INFILTRATION; INTRACAUDAL; PERINEURAL
Status: COMPLETED
Start: 2022-07-21 | End: 2022-07-21

## 2022-07-21 RX ORDER — HEPARIN SODIUM 10000 [USP'U]/100ML
500 INJECTION, SOLUTION INTRAVENOUS CONTINUOUS
Status: DISCONTINUED | OUTPATIENT
Start: 2022-07-21 | End: 2022-07-21

## 2022-07-21 RX ORDER — ALBUMIN, HUMAN INJ 5% 5 %
12.5 SOLUTION INTRAVENOUS ONCE
Status: COMPLETED | OUTPATIENT
Start: 2022-07-21 | End: 2022-07-21

## 2022-07-21 RX ORDER — FUROSEMIDE 10 MG/ML
40 INJECTION INTRAMUSCULAR; INTRAVENOUS ONCE
Status: COMPLETED | OUTPATIENT
Start: 2022-07-21 | End: 2022-07-21

## 2022-07-21 RX ORDER — PROTAMINE SULFATE 10 MG/ML
25 INJECTION, SOLUTION INTRAVENOUS ONCE
Status: COMPLETED | OUTPATIENT
Start: 2022-07-21 | End: 2022-07-21

## 2022-07-21 RX ORDER — PROTAMINE SULFATE 10 MG/ML
INJECTION, SOLUTION INTRAVENOUS
Status: COMPLETED
Start: 2022-07-21 | End: 2022-07-21

## 2022-07-21 RX ORDER — NOREPINEPHRINE BITARTRATE 0.06 MG/ML
.01-.6 INJECTION, SOLUTION INTRAVENOUS CONTINUOUS
Status: DISCONTINUED | OUTPATIENT
Start: 2022-07-21 | End: 2022-07-23

## 2022-07-21 RX ORDER — HEPARIN SODIUM 10000 [USP'U]/100ML
500 INJECTION, SOLUTION INTRAVENOUS CONTINUOUS
Status: DISCONTINUED | OUTPATIENT
Start: 2022-07-21 | End: 2022-07-22

## 2022-07-21 RX ORDER — NOREPINEPHRINE BITARTRATE 0.06 MG/ML
INJECTION, SOLUTION INTRAVENOUS
Status: DISCONTINUED
Start: 2022-07-21 | End: 2022-07-22 | Stop reason: HOSPADM

## 2022-07-21 RX ORDER — LIDOCAINE HYDROCHLORIDE 10 MG/ML
5 INJECTION, SOLUTION EPIDURAL; INFILTRATION; INTRACAUDAL; PERINEURAL ONCE
Status: COMPLETED | OUTPATIENT
Start: 2022-07-21 | End: 2022-07-21

## 2022-07-21 RX ADMIN — DULOXETINE 30 MG: 30 CAPSULE, DELAYED RELEASE ORAL at 07:46

## 2022-07-21 RX ADMIN — HEPARIN SODIUM AND DEXTROSE 500 UNITS/HR: 10000; 5 INJECTION INTRAVENOUS at 13:04

## 2022-07-21 RX ADMIN — HYDROMORPHONE HYDROCHLORIDE 0.4 MG: 0.2 INJECTION, SOLUTION INTRAMUSCULAR; INTRAVENOUS; SUBCUTANEOUS at 04:05

## 2022-07-21 RX ADMIN — Medication 6.25 MG: at 21:11

## 2022-07-21 RX ADMIN — FUROSEMIDE 40 MG: 10 INJECTION, SOLUTION INTRAVENOUS at 17:29

## 2022-07-21 RX ADMIN — ALBUMIN HUMAN 250 ML: 0.05 INJECTION, SOLUTION INTRAVENOUS at 22:57

## 2022-07-21 RX ADMIN — FLUTICASONE PROPIONATE AND SALMETEROL XINAFOATE 2 PUFF: 115; 21 AEROSOL, METERED RESPIRATORY (INHALATION) at 20:38

## 2022-07-21 RX ADMIN — VALPROIC ACID 250 MG: 250 SOLUTION ORAL at 09:29

## 2022-07-21 RX ADMIN — OXYCODONE HYDROCHLORIDE 10 MG: 10 TABLET ORAL at 02:00

## 2022-07-21 RX ADMIN — LIDOCAINE HYDROCHLORIDE 5 ML: 10 INJECTION, SOLUTION EPIDURAL; INFILTRATION; INTRACAUDAL; PERINEURAL at 09:28

## 2022-07-21 RX ADMIN — OXYCODONE HYDROCHLORIDE 10 MG: 10 TABLET ORAL at 09:29

## 2022-07-21 RX ADMIN — HYDROXYCHLOROQUINE SULFATE 200 MG: 200 TABLET, FILM COATED ORAL at 21:13

## 2022-07-21 RX ADMIN — INSULIN GLARGINE 20 UNITS: 100 INJECTION, SOLUTION SUBCUTANEOUS at 07:45

## 2022-07-21 RX ADMIN — HYDRALAZINE HYDROCHLORIDE 25 MG: 25 TABLET, FILM COATED ORAL at 21:12

## 2022-07-21 RX ADMIN — Medication 1 PACKET: at 15:11

## 2022-07-21 RX ADMIN — OXYCODONE HYDROCHLORIDE 10 MG: 10 TABLET ORAL at 21:12

## 2022-07-21 RX ADMIN — PROTAMINE SULFATE 25 MG: 10 INJECTION, SOLUTION INTRAVENOUS at 08:56

## 2022-07-21 RX ADMIN — ALBUMIN HUMAN 12.5 G: 0.05 INJECTION, SOLUTION INTRAVENOUS at 21:59

## 2022-07-21 RX ADMIN — INSULIN ASPART 1 UNITS: 100 INJECTION, SOLUTION INTRAVENOUS; SUBCUTANEOUS at 11:56

## 2022-07-21 RX ADMIN — ONDANSETRON 4 MG: 2 INJECTION INTRAMUSCULAR; INTRAVENOUS at 09:24

## 2022-07-21 RX ADMIN — Medication 6.25 MG: at 15:10

## 2022-07-21 RX ADMIN — INSULIN ASPART 1 UNITS: 100 INJECTION, SOLUTION INTRAVENOUS; SUBCUTANEOUS at 07:46

## 2022-07-21 RX ADMIN — ACETAMINOPHEN 975 MG: 325 TABLET, FILM COATED ORAL at 05:43

## 2022-07-21 RX ADMIN — Medication 1 PACKET: at 21:14

## 2022-07-21 RX ADMIN — OXYCODONE HYDROCHLORIDE 10 MG: 10 TABLET ORAL at 15:08

## 2022-07-21 RX ADMIN — Medication 1 PACKET: at 07:45

## 2022-07-21 RX ADMIN — ACETAMINOPHEN 975 MG: 325 TABLET, FILM COATED ORAL at 21:12

## 2022-07-21 RX ADMIN — INSULIN ASPART 1 UNITS: 100 INJECTION, SOLUTION INTRAVENOUS; SUBCUTANEOUS at 15:56

## 2022-07-21 RX ADMIN — FLUTICASONE PROPIONATE AND SALMETEROL XINAFOATE 2 PUFF: 115; 21 AEROSOL, METERED RESPIRATORY (INHALATION) at 07:46

## 2022-07-21 RX ADMIN — ACETAMINOPHEN 975 MG: 325 TABLET, FILM COATED ORAL at 15:08

## 2022-07-21 RX ADMIN — CALCIUM GLUCONATE 1 G: 20 INJECTION, SOLUTION INTRAVENOUS at 10:40

## 2022-07-21 RX ADMIN — Medication 40 MG: at 07:45

## 2022-07-21 RX ADMIN — HEPARIN SODIUM 1500 UNITS/HR: 10000 INJECTION, SOLUTION INTRAVENOUS at 03:57

## 2022-07-21 RX ADMIN — HYDROXYZINE HYDROCHLORIDE 50 MG: 25 TABLET ORAL at 07:45

## 2022-07-21 RX ADMIN — OXYCODONE HYDROCHLORIDE 10 MG: 10 TABLET ORAL at 18:37

## 2022-07-21 RX ADMIN — Medication 6.25 MG: at 09:57

## 2022-07-21 RX ADMIN — HYDROXYZINE HYDROCHLORIDE 50 MG: 25 TABLET ORAL at 02:01

## 2022-07-21 RX ADMIN — VALPROIC ACID 250 MG: 250 SOLUTION ORAL at 21:15

## 2022-07-21 RX ADMIN — QUETIAPINE FUMARATE 100 MG: 100 TABLET ORAL at 21:12

## 2022-07-21 RX ADMIN — ASPIRIN 81 MG CHEWABLE TABLET 81 MG: 81 TABLET CHEWABLE at 07:45

## 2022-07-21 RX ADMIN — PIPERACILLIN AND TAZOBACTAM 4.5 G: 4; .5 INJECTION, POWDER, LYOPHILIZED, FOR SOLUTION INTRAVENOUS at 05:03

## 2022-07-21 RX ADMIN — HYDRALAZINE HYDROCHLORIDE 25 MG: 25 TABLET, FILM COATED ORAL at 15:08

## 2022-07-21 RX ADMIN — VALPROIC ACID 250 MG: 250 SOLUTION ORAL at 02:01

## 2022-07-21 RX ADMIN — VALPROIC ACID 500 MG: 250 SOLUTION ORAL at 23:26

## 2022-07-21 RX ADMIN — OXYCODONE HYDROCHLORIDE 10 MG: 10 TABLET ORAL at 05:44

## 2022-07-21 RX ADMIN — Medication 0.09 MCG/KG/MIN: at 22:47

## 2022-07-21 RX ADMIN — FUROSEMIDE 40 MG: 10 INJECTION, SOLUTION INTRAVENOUS at 08:09

## 2022-07-21 RX ADMIN — PIPERACILLIN AND TAZOBACTAM 4.5 G: 4; .5 INJECTION, POWDER, LYOPHILIZED, FOR SOLUTION INTRAVENOUS at 11:30

## 2022-07-21 RX ADMIN — HYDROXYCHLOROQUINE SULFATE 200 MG: 200 TABLET, FILM COATED ORAL at 07:46

## 2022-07-21 RX ADMIN — Medication 10 MG: at 21:12

## 2022-07-21 RX ADMIN — MULTIVITAMIN 15 ML: LIQUID ORAL at 07:45

## 2022-07-21 RX ADMIN — PIPERACILLIN AND TAZOBACTAM 4.5 G: 4; .5 INJECTION, POWDER, LYOPHILIZED, FOR SOLUTION INTRAVENOUS at 18:38

## 2022-07-21 RX ADMIN — HYDRALAZINE HYDROCHLORIDE 25 MG: 25 TABLET, FILM COATED ORAL at 05:43

## 2022-07-21 ASSESSMENT — ACTIVITIES OF DAILY LIVING (ADL)
ADLS_ACUITY_SCORE: 36
ADLS_ACUITY_SCORE: 38
ADLS_ACUITY_SCORE: 38
ADLS_ACUITY_SCORE: 36
ADLS_ACUITY_SCORE: 38
ADLS_ACUITY_SCORE: 36
ADLS_ACUITY_SCORE: 38
ADLS_ACUITY_SCORE: 38
ADLS_ACUITY_SCORE: 36

## 2022-07-21 NOTE — PROGRESS NOTES
Clinical Swallow Evaluation    07/21/22 1414   General Information   Onset of Illness/Injury or Date of Surgery 06/17/22   Referring Physician Agus Grover MD   Patient/Family Therapy Goal Statement (SLP) Wants to drink water   Pertinent History of Current Problem Dandy Sands is a 61 year old male with a PMH of CAD s/p PCI to LAD, MI, ICM, acute on chronic heart failure, s/p CRT-D, severe MR, antiphospholipid antibody syndrome on chronic warfarin, SLE, HTN, HLD, recent hospitalization 5/16-5/26 s/p RCA, LAD stenting who underwent RVAD (29F Protek duo RIJ) LVAD placement (HeartMate 3) on 7/8/22 by Dr. Zamora. Intraoperative course complicated by IABP dysfunction and RV failure, requiring crash onto bypass / vasopressor support, NO, and protek placement. Now s/p chest closure and NO wean 7/11. Return to OR for hemopericardium (7/12) and chest re-closure 7/13. Protek RVAD removed 7/21.   General Observations Pt is alert and agreeable to evaluation. No family present   Past History of Dysphagia None noted upon review of EMR, pt denies any baseline dysphagia. RN reported coughing with liquids earlier today.   Type of Evaluation   Type of Evaluation Swallow Evaluation   Oral Motor   Oral Musculature generally intact   Structural Abnormalities none present   Mucosal Quality adequate   Dentition (Oral Motor)   Dentition (Oral Motor) adequate dentition   Facial Symmetry (Oral Motor)   Facial Symmetry (Oral Motor) WNL   Lip Function (Oral Motor)   Lip Range of Motion (Oral Motor) WNL   Tongue Function (Oral Motor)   Tongue ROM (Oral Motor) WNL   Vocal Quality/Secretion Management (Oral Motor)   Vocal Quality (Oral Motor) hoarse  (mildly hoarse)   Secretion Management (Oral Motor) WNL   General Swallowing Observations   Respiratory Support (General Swallowing Observations) nasal cannula  (HFNC, 35% FiO2 35 lpm)   Current Diet/Method of Nutritional Intake (General Swallowing Observations, NIS) full liquid diet;thin  liquids (level 0)   Swallowing Evaluation Clinical swallow evaluation   Clinical Swallow Evaluation   Feeding Assistance frequent cues/help required   Clinical Swallow Evaluation Textures Trialed thin liquids;pureed;solid foods   Clinical Swallow Eval: Thin Liquid Texture Trial   Mode of Presentation, Thin Liquids straw;self-fed;fed by clinician   Volume of Liquid or Food Presented 12 oz   Oral Phase of Swallow WFL   Pharyngeal Phase of Swallow intact   Diagnostic Statement No overt s/sx of aspiration, no changes in breath sounds or vocal quality   Clinical Swallow Evaluation: Puree Solid Texture Trial   Mode of Presentation, Puree spoon;fed by clinician   Volume of Puree Presented 3 oz   Oral Phase, Puree WFL   Pharyngeal Phase, Puree intact   Diagnostic Statement Adequate oral manipulation and clearance, no overt s/sx of aspiration. Pt denies any difficulty   Clinical Swallow Evaluation: Solid Food Texture Trial   Diagnostic Statement Pt declined any advanced solid trials - not interested in food only thirsty   Esophageal Phase of Swallow   Patient reports or presents with symptoms of esophageal dysphagia No   Swallowing Recommendations   Diet Consistency Recommendations full liquid diet;thin liquids (level 0)   Supervision Level for Intake close supervision needed   Mode of Delivery Recommendations bolus size, small;slow rate of intake   Swallowing Maneuver Recommendations alternate food and liquid intake;extra swallow   Monitoring/Assistance Required (Eating/Swallowing) stop eating activities when fatigue is present;monitor for cough or change in vocal quality with intake   Recommended Feeding/Eating Techniques (Swallow Eval) maintain upright sitting position for eating;maintain upright posture during/after eating for 30 minutes;minimize distractions during oral intake   Medication Administration Recommendations, Swallowing (SLP) As per pt preference   Instrumental Assessment Recommendations instrumental  evaluation not recommended at this time   General Therapy Interventions   Planned Therapy Interventions Dysphagia Treatment   Dysphagia treatment Modified diet education;Instruction of safe swallow strategies   Clinical Impression   Criteria for Skilled Therapeutic Interventions Met (SLP Aly) Yes, treatment indicated   SLP Diagnosis Oropharyngeal dysphagia   Risks & Benefits of therapy have been explained evaluation/treatment results reviewed;care plan/treatment goals reviewed;current/potential barriers reviewed;participants voiced agreement with care plan;participants included   Clinical Impression Comments Pt seen for clinical swallow evaluation. He consumed thin liquids and puree without overt s/sx of aspiration, no changes in breath sounds or vocal quality. This appears an improvement as nursing noted earlier pt to be coughing with liquids and he consumed 12 oz without difficulty during evaluation. Swallow appeared timely, single swallows per bolus, slight increase in RR with intake but overall good tolerance. Pt not interested in trying solids during evaluation today. Oropharyngeal dysphagia secondary to deconditioned state and recent intubation. Recommend full liquid diet with thin liquids - complete upright position, slow rate, small bites/sips. Initiate SLP services for dysphagia.   SLP Discharge Planning   SLP Discharge Recommendation Transitional Care Facility;Acute Rehab Center-Motivated patient will benefit from intensive, interdisciplinary therapy.  Anticipate will be able to tolerate 3 hours of therapy per day   SLP Rationale for DC Rec Dysphagia, below baseline   SLP Brief overview of current status  Oropharyngeal dysphagia secondary to deconditioned state and recent intubation. Recommend full liquid diet with thin liquids - complete upright position, slow rate, small bites/sips. Initiate SLP services for dysphagia.    Total Evaluation Time   Total Evaluation Time (Minutes) 10   SLP Goals   Therapy  Frequency (SLP Eval) 5 times/wk   SLP Predicted Duration/Target Date for Goal Attainment 08/11/22   SLP Goals Swallow   SLP: Safely tolerate diet without signs/symptoms of aspiration Regular diet;Thin liquids

## 2022-07-21 NOTE — PLAN OF CARE
Cared for pt [id number]    Major Shift Events:  Afebrile. Alert. Forgetful. At 1600, oriented to self and year, knew he was in a hospital w/ choices. Scheduled pain meds given w/ relief. Continues on high flow nasal canula at 35% FiO2 and 35L. LVAD wnl, no alarms. Old RVAD site wdl. Speech assessed pt this afternoon and now full liquid diet. Started heparin gtt at 500 units/hr, straight rate. Pt up in chair for about two hours this afternoon.      Plan: Continue to work with therapies.     For vital signs and complete assessments, please see documentation flowsheets.

## 2022-07-21 NOTE — PROGRESS NOTES
[facility]    Cardiology Progress Note- Cards 2        Date of Admission:  6/17/2022     Assessment & Plan: HVSL   Dandy EDUARD Sands is a 61 year old male with PMH of lupus, antiphospholipid syndrome, HTN, HLD, HFrEF 2/2 ICM who presented with cardiogenic shock c/b multiorgan failure (JOSE, shock liver) requiring mechanical support with IABP, now s/p HM3 LVAD 7/8/22 by Dr. Zamora with intraoperative course c/b RV failure s/p 29F Protek duo via RIJ. Post op course c/b hemopericardium s/p repeat washout with chest left open. Chest now closed 7/13/22     #HFrEF 2/2 ICM s/p HM3 LVAD in setting of cardiogenic shock (SCAI D)  #RV failure s/p Protek duo temporary RVAD  #Post chest closure hemopericardium s/p repeat washout  Patient with ICM s/p HM3 LVAD 7/8/22 by Dr. Zamora in setting of presentation for cardiogenic shock requiring MCS with IABP as prior to HM (initially evaluated for heart transplant, however noted to have substantially elevated DSAs during course of care, so decision made to proceed with LVAD given patient already on temporary MCS). Intraoperative course c/b RV failure resulting in cardiogenic shock requiring high dose pressors necessitating RVAD support. Chest closed 7/11 and Ashli weaned. However developed tachycardia, lactic acidosis and decreased hemoglobin with CT scan noting hemopericardium. Returned to OR where underwent washout with large clot burden noted over the right atrium and around LVAD outflow graft. Chest left open and returned to ICU where pressors were able to be weaned. Noted to have stable RVAD/LVAD flows throughout and post procedurally. Ultimately returned to OR for repeat closure. Currently hemodynamically stable with stable end-organ function and hemoglobin. Currently attempting PSTs but with difficulty with mental status. Weaning RVAD support   -LVAD: continues to have good flows with few alarms with rare PI event alarms; will  continue at current speed and monitor for alarms  -RVAD: currently tolerating RVAD wean well without issues with hemodynamics or LVAD alarms; will plan for decannulation today  -AC, antiplatelets: per surgical service; has had some post-operative bleeding and prior hemopericardium; will need to monitor coags closely in setting of heparin and ASA use  -Volume status: appears euvolemic to slightly hypervolemic; lasix x1 this AM; goal net negative 500cc-1L  -rhythm: noted for atrial fibrillation prior, likely provoked in setting of biventricular support; will stop amio gtt. If recurs, could consider low dose oral amio as needed   -of note, patient with climbing capture threshold of RV lead; will need to be evaluated in future by EP team  -hypertension: continue hydralazine 25 mg q8h; will start low dose captopril      The patient's care was discussed with the Attending Physician, Dr. Lora, Bedside Nurse and Primary team.    Emelyn Meneses MD  [facility]    ______________________________________________________________________    Interval History   Nursing notes reviewed. BRAXTON. This AM, following commands intermittently but still with delirium. Gives thumbs up to provider today when discussing possible removal of RVAD    Data reviewed today: I reviewed all medications, new labs and imaging results over the last 24 hours    Physical Exam   Vital Signs: Temp: 97.6  F (36.4  C) Temp src: Axillary  Pulse: 107   Resp: 23 SpO2: 100 % O2 Device: High Flow Nasal Cannula (HFNC) Oxygen Delivery: 60 LPM  Weight: 182 lbs 15.71 oz  General Appearance: Older male in NAD breathing on HFNC  Respiratory: caorse lung sounds, slightly increased WOB  Cardiovascular: vad hum, driveline site c/d/i, protek in place, chest incisions c/d/i, JVD ~12 cm   GI: soft, bs active   Skin: warm, well perfused, 1+ pitting edema to the mid-tibia  Neuro: older man in NAD, moving all 4, intermittently  following commands, answering some short questions at times    Data   Recent Labs   Lab 07/21/22  0347 07/21/22  0332 07/21/22  0331 07/20/22  2346 07/20/22  2107 07/20/22 2102 07/20/22  1947 07/20/22  1523 07/20/22  0404 07/20/22  0355 07/19/22  0331 07/19/22  0322   WBC  --  15.8* 16.4*  --   --   --   --  23.0*   < > 13.9*   < > 15.5*   HGB  --  8.2* 8.2*  --  8.7*  --   --  8.7*   < > 8.1*   < > 7.0*   MCV  --  89 91  --   --   --   --  90   < > 90   < > 92   PLT  --  185 191  --   --   --   --  201   < > 157   < > 155   INR  --   --  1.34*  --   --  1.34*  --  1.35*   < > 1.35*   < > 1.35*   NA  --   --  141  --   --  140  --  140   < > 140   < > 141   POTASSIUM  --   --  4.1  --   --  4.5  --  4.2   < > 3.8   < > 3.6   CHLORIDE  --   --  106  --   --  106  --  106   < > 105   < > 106   CO2  --   --  23  --   --  22  --  22   < > 24   < > 27   BUN  --   --  27.1*  --   --  27.9*  --  30.4*   < > 25.6*   < > 24.2*   CR  --   --  0.70  --   --  0.76  --  0.76   < > 0.67   < > 0.65*   ANIONGAP  --   --  12  --   --  12  --  12   < > 11   < > 8   ELDA  --   --  8.2*  --   --  8.3*  --  8.1*   < > 8.0*   < > 8.1*   *  --  119* 126*  --  133*   < > 139*   < > 125*   < > 114*   ALBUMIN  --   --  2.7*  --   --   --   --   --   --  2.7*  --  2.5*   PROTTOTAL  --   --  5.6*  --   --   --   --   --   --   --   --  5.0*   BILITOTAL  --   --  0.8  --   --   --   --   --   --   --   --  0.6   ALKPHOS  --   --  145*  --   --   --   --   --   --   --   --  146*   ALT  --   --  12  --   --   --   --   --   --   --   --  11   AST  --   --  32  --   --   --   --   --   --   --   --  24    < > = values in this interval not displayed.       Medications     amiodarone 0.5 mg/min (07/21/22 0600)     dextrose       heparin 1,500 Units/hr (07/21/22 0600)       acetaminophen  975 mg Oral or Feeding Tube Q8H CAMMY     aspirin  81 mg Oral or Feeding Tube Daily     DULoxetine  30 mg Oral Daily     fiber modular (NUTRISOURCE FIBER)  1  packet Per Feeding Tube TID     fluticasone-salmeterol  2 puff Inhalation BID     hydrALAZINE  25 mg Oral or Feeding Tube Q8H CAMMY     hydroxychloroquine  200 mg Oral BID     insulin aspart  1-12 Units Subcutaneous Q4H     insulin glargine  20 Units Subcutaneous QAM AC     melatonin  10 mg Oral QPM     multivitamins w/minerals  15 mL Per Feeding Tube Daily     oxyCODONE  10 mg Oral or Feeding Tube Q4H     pantoprazole  40 mg Per Feeding Tube QAM AC     piperacillin-tazobactam  4.5 g Intravenous Q6H     protein modular  1 packet Per Feeding Tube TID     QUEtiapine  100 mg Oral or Feeding Tube At Bedtime     sodium chloride (PF)  3 mL Intracatheter Q8H     sodium chloride (PF)  3 mL Intracatheter Q8H     valproic acid  250 mg Oral Q8H       TTE 7/17/2022:  HM3 LVAD at 5200 RPM and Protek Duo RVAD at 4.3L/min.  Normal LV size with severely reduced global LV function, LVEF<20%. LVIDd= 4.6  cm.  Normal RV size and mildly reduced RV function, RVFAC 31%. The ventricular  septum is midline.  The aortic valve opens minimally with each beat. There is no AI.  LVAD inflow cannula is visualized in the LV apex. Normal Doppler interrogation  of the LVAD inflow cannula.  LVAD outflow graft is not well assessed.  Organized material is present in the pericardial space, most prominently  anterior to the RV free wall. There is no evidence of pericardial tamponade.  This was also present on the 7/12/22 study, but is better imaged on today's  study.     This study was compared with the study from 7/12/22: Image quality is  significantly improved. There has otherwise been no significant change,  however the prior study was extremely limited.

## 2022-07-21 NOTE — PLAN OF CARE
Shift Summary [id number]    Major Shift Events:   No acute changes during shift. Pt remains delirious and anxious at times, prn atarax added, PERRLA, afebrile. ST, remains off pressors, LVAD w/no alarms see flowsheets for further details. Team made aware about growing thrombis in Centrimag Protek Duo circuit, no new orders received, plan to remove during day shift, alarms include low SVO2, no flow issues overnight. Pulses remain doppler. Pt remains on High Flow Nasal cannula at 35%/60L. Unable to get SPO2 reading w/monitor, frequent ABGs drawn to assess oxygenation, team made aware. Fitzgerald w/adequate output, Rectal tube remains w/adequate output, TF remain at goal. No new skin issues present during shift.    Calcium replaced per prn x1    Plan: Remove Centrimag today   For vital signs and complete assessments, please see documentation flowsheets.                 Goal Outcome Evaluation:    Plan of Care Reviewed With: patient     Overall Patient Progress: improving    Outcome Evaluation: pt remains w/RVAD and LVAD, plan to removed RVAD during day shift, remains off pressors, no flow alarms, pt is delerious

## 2022-07-21 NOTE — PROGRESS NOTES
Decannulation note    Patient decannulated by Dr. Zamora and Resident doctor at bedside in ICU. Site was sutured and pressure held until bleeding was stopped, protamine given. Cannula limbs clamped and circuit pulled from patient at 0855. Doing well off RVAD support.    Julianna Leos RRT-NPS

## 2022-07-21 NOTE — PROGRESS NOTES
CV ICU PROGRESS NOTE  07/21/2022        CO-MORBIDITIES  1. Cardiogenic shock (H)  (primary encounter diagnosis)  2. Ischemic cardiomyopathy  3. Heart failure with reduced ejection fraction, NYHA class III (H)  4. Coronary artery disease involving native coronary artery of native heart without angina pectoris      ASSESSMENT Dandy Sands is a 61 year old male with a PMH of CAD s/p PCI to LAD, MI, ICM, acute on chronic heart failure, s/p CRT-D, severe MR, antiphospholipid antibody syndrome on chronic warfarin, SLE, HTN, HLD, recent hospitalization 5/16-5/26 s/p RCA, LAD stenting who underwent RVAD (29F Protek duo RIJ) LVAD placement (HeartMate 3) on 7/8/22 by Dr. Zamora. Intraoperative course complicated by IABP dysfunction and RV failure, requiring crash onto bypass / vasopressor support, NO, and protek placement. Now s/p chest closure and NO wean 7/11. Return to OR for hemopericardium (7/12) and chest re-closure 7/13. Protek RVAD removed 7/21.     Today's Progress:  - RVAD removed 7/21 AM   - I/O goal: net even to negative 500  - Agitation: Valproate 250 mg BID and 500mg evening   - Wean HFNC  - Transition to captopril per cards   - Discontinue amiodarone gtt  - AC: heparin straight 500u/hr 4 hrs after pulling protek  - SLP consult for cough/clear orals  - Lasix 40mg x1      Overnight Events:  - Ongoing delirium and anxiety   - Continues off pressors  - Stable respiratory status       PLAN:   Neuro/ pain/ sedation:  #Acute Postoperative pain  #Depression   #Anxiety due to a medical condition  #Insomnia  - Monitor neurological status. Notify the MD for any acute changes in exam.  - Pain:    -Scheduled tylenol, scheduling oxycodone 10mg q4h.    -PRN dilaudid  - Depression: Duloxetine 30mg  - Sleep: Melatonin 10 mg HS, Seroquel 100 mg HS,   - Anxiety: Valproate po 250 mg BID and 500mg Bedtime   - Hydroxyzine PRN  - PTA meds: hydroxyzine 25mg PRN, zolpidem 5mg, melatonin 3mg, lorazepam 0.5mg  - QTc(7/18)- 683 msec,  hence no haldol     Pulmonary:   #Acute hypoxic respiratory failure on iMV  #Mild-moderate emphysema  #History of COVID-19  Off nitric 7/12, extubated 7/20 to HFNC.  - Wean supplemental O2 as able  - Maintain SpO2> 92%  - Incentive spirometry     Cardiovascular:    #S/p LVAD placement (HeartMate 3) on 7/8/22   #S/p RVAD (29F Protek duo RIJ) on 7/8/22  #S/p chest closure 7/11  #Cardiogenic shock  #Advanced ischemic cardiomyopathy, class IV, stage D  #CAD s/p PCI to LAD (2005)  #S/p CRT-D (2006)  #History of MI (2007)  #Severe MR  #Antiphospholipid antibody syndrome on chronic warfarin  #HTN, HLD  #Recent hospitalization 5/16-5/26 s/p RCA, LAD stenting  #Atrial fibrillation   - Monitor hemodynamic status. Chest closure and oxygenator splicing 7/11. Reopened 7/12 for I&D and left open, closed 7/13.  - Goal MAP > 65   - PTA meds(held): atorvastatin 40mg, clopidogrel 75mg, lasix 40mg, warfarin 5mg  - LVAD management per Advanced [facility] service   - flows stable, circuit functioning w/o clots/fibrin  - AC Plan: start heparin 500U/hr 4 hours after pulling RVAD.  - Aspirin 81 mg  - Hydralazine uriel and prn MAP >85  - Discontinued Amiodarone gtt, started captopril per cards.   - RVAD removed 7/21. Will continue to trend SvO2(goal >40)           GI / Nutrition:   #GERD  #Congestive hepatopathy in the setting of cardiac insuffiencey -resolved  # Hematemesis / oral mucosal bleeding -resolved    - NPO, pending SLP evaluation (cough w/ bedside liquids trial)  - Continue NJT at goal  - Bowel regimen prn (miralax / senna)  - Nutrition consulted  - Trend LFT's every alternate day    Renal/ Fluid Balance:    - Strict I/O, daily weights   - Avoid/limit nephrotoxins as able  - Replete lytes PRN per protocol  - PTA meds: tamsulosin 0.4mg (held)  - Fluid goal: net even to net negative 500cc  - Lasix 40 mg x1    Endocrine:    #Stress induced hyperglycemia  Preop A1c 5.5%  - Insulin glargine 20U and HD sliding scale  - Goal BG <180 for optimal  healing     ID/ Antibiotics:  #Periopearive antibiosis (s/p course of Cefepime, vancomycin, rifampin, fluconazole)  #HAP - Enterococcus faecalis PNA  - Discontinue Cefepime and Vancomycin (7/13-7/14), chest now closed   - Trend fever curve   - Pan culture (7/12): Bcx x2, UA/UC -> negative     -7/14 Endotracheal sputum culture (4+ candida albicans, 1+ E.Coli, 3+ enterococcus faecalis)  - 7/18 C.diff- negative  - Zosyn for HAP(7/15-7/22)    Heme:     #Acute blood loss anemia  #Acute blood loss thrombocytopenia  #History of DVT and PE   #Antiphospholipid antibody syndrome  #History of iron deficiency anemia  - Monitor for s/sx of bleeding  - Trend CBC and coags.  - Hgb goal >8  - Plt goal > 20    Rheumatology:  #SLE  - Chronic, symptoms include arthritis, photosensitivity, fatigue, and skin manifestations  - PTA meds: hydroxychloroquine 200mg, resumed 7/16  - Rheumatology consulted and following. Follow dsDNA and complement levels(7/18).    -Will need cellcept restarted when appropriate.    Prophylaxis:    - DVT prophylaxis: SCD + straight rate heparin after pulling RVAD   - Aspirin 81 mg  - PPI  - PT/OT    Lines / Tubes / Drains:  - R triple lumen PICC(6/17-)  - Left Axillary A-line(7/18-)  - Fitzgerald(7/8-)  - NJT(7/11-)  - Chest Tubes(7/8-)  - Rectal tube      Disposition:  - CVICU.    Patient seen, findings and plan discussed with CV ICU staff, Dr. Davidson and CVICU fellow, Dr. Agus Grover.    Rashard Stephenson MD  Anesthesia, PGY3    ----------------------------------------------------------------      Subjective:  Mentioned as above.    OBJECTIVE:   1. VITAL SIGNS:   Temp:  [96.9  F (36.1  C)-99.3  F (37.4  C)] 97.6  F (36.4  C)  Pulse:  [] 104  Resp:  [10-49] 18  MAP:  [60 mmHg-111 mmHg] 80 mmHg  Arterial Line BP: ()/(53-97) 87/69  FiO2 (%):  [35 %-100 %] 35 %  SpO2:  [92 %-100 %] 100 %  Vent Mode: (S) CPAP/PS  (Continuous positive airway pressure with Pressure Support)  FiO2 (%): 35 %  Resp Rate (Set): 12  breaths/min  Tidal Volume (Set, mL): 450 mL  PEEP (cm H2O): 5 cmH2O  Pressure Support (cm H2O): 7 cmH2O  Resp: 18      2. INTAKE/ OUTPUT:   I/O last 3 completed shifts:  In: 2928.78 [I.V.:1298.78; NG/GT:480]  Out: 3365 [Urine:1825; Emesis/NG output:50; Stool:850; Chest Tube:640]    3. PHYSICAL EXAMINATION:   General: critically ill  Neuro: follow commands, off sedation, agitation/delerium  Resp: on HFNC  CV: tachycardic  Abdomen: Soft, Non-distended, Non-tender  Incisions: c/d/i  Extremities: warm and well perfused    4. INVESTIGATIONS:   Arterial Blood Gases   Recent Labs   Lab 07/21/22 0332 07/20/22 2322 07/20/22 2102 07/20/22 1948   PH 7.45 7.45 7.45 7.45   PCO2 37 36 33* 34*   PO2 117* 109* 122* 113*   HCO3 25 25 23 24     Complete Blood Count   Recent Labs   Lab 07/21/22 0332 07/21/22 0331 07/20/22 2107 07/20/22  1523 07/20/22  0949   WBC 15.8* 16.4*  --  23.0* 15.9*   HGB 8.2* 8.2* 8.7* 8.7* 8.5*    191  --  201 150     Basic Metabolic Panel  Recent Labs   Lab 07/21/22  0736 07/21/22 0347 07/21/22 0331 07/20/22 2346 07/20/22 2102 07/20/22 1947 07/20/22  1523 07/20/22  1117 07/20/22  0949   NA  --   --  141  --  140  --  140  --  139   POTASSIUM  --   --  4.1  --  4.5  --  4.2  --  3.9   CHLORIDE  --   --  106  --  106  --  106  --  106   CO2  --   --  23  --  22  --  22  --  25   BUN  --   --  27.1*  --  27.9*  --  30.4*  --  27.0*   CR  --   --  0.70  --  0.76  --  0.76  --  0.66*   * 120* 119* 126* 133*   < > 139*   < > 155*    < > = values in this interval not displayed.     Liver Function Tests  Recent Labs   Lab 07/21/22  0331 07/20/22 2102 07/20/22  1523 07/20/22  0949 07/20/22  0355 07/19/22  0946 07/19/22  0322 07/18/22 2118 07/18/22  1526 07/18/22  0954 07/18/22  0422   AST 32  --   --   --   --   --  24  --  23  --   --    ALT 12  --   --   --   --   --  11  --  12  --  13   ALKPHOS 145*  --   --   --   --   --  146*  --  187*  --   --    BILITOTAL 0.8  --   --   --   --    --  0.6  --  0.7  --   --    ALBUMIN 2.7*  --   --   --  2.7*  --  2.5*  --  2.3*  --  2.6*   INR 1.34* 1.34* 1.35* 1.29* 1.35*   < > 1.35*   < > 1.31*   < > 1.23*    < > = values in this interval not displayed.     Pancreatic Enzymes  No lab results found in last 7 days.  Coagulation Profile  Recent Labs   Lab 07/21/22  0331 07/20/22  2102 07/20/22  1523 07/20/22  0949   INR 1.34* 1.34* 1.35* 1.29*   * 164* 166* 168*         5. RADIOLOGY:   Recent Results (from the past 24 hour(s))   XR Abdomen Port 1 View    Narrative    EXAM: XR ABDOMEN PORT 1 VIEW  7/20/2022 1:29 PM     HISTORY:  s/p ET removal       TECHNIQUE: Single frontal radiograph of the abdomen    COMPARISON:  CT abdomen 7/12/2022    FINDINGS:   Single AP portable supine view of the abdomen. Enteric tube and stylet  projects over the expected location of the proximal jejunum.  Mediastinal, pericardial, and pleural tube traverse above the  field-of-view. Partially visualized LVAD and drive line. IVC filter  projects over the expected location of IVC.    Nonobstructive bowel gas pattern. No pneumatosis, portal venous gas.      Impression    IMPRESSION:   1. Nonobstructive bowel gas pattern.   2. Enteric tube with tip and stylette project over the expected  location of the proximal jejunum.    I have personally reviewed the examination and initial interpretation  and I agree with the findings.    MILES KENNY MD         SYSTEM ID:  [id number]   XR Chest Port 1 View    Narrative    EXAM: XR CHEST PORT 1 VIEW  7/21/2022 6:00 AM     HISTORY:  intubated, RVAD and LVAD       COMPARISON:  Radiographs of 7/20/2022, 7/19/2022.    TECHNIQUE: AP view the chest at 20 degrees    FINDINGS:   Devices, lines, tubes: Interval removal of endotracheal tube,  esophageal temperature probe, an enteric tube. Partially visualized  LVAD. Right internal jugular ECMO cannula tip projecting over the main  pulmonary artery. Right upper extremity PICC tip is obscured  but  likely projecting over the mid SVC. Left basilar chest tube and  mediastinal/pericardial drains are stable. Feeding tube coursing  inferior to the left hemidiaphragm with tip outside the field-of-view.  Left chest wall implantable cardiac defibrillator leads in stable  positioning. Median sternotomy wires are intact. Coronary artery  stenting.    The left costophrenic angle is collimated outside the field of view.  The trachea is midline. The cardiomediastinal silhouette is stably  enlarged. Increasing perihilar hazy opacities. Blunting of the right  costophrenic angle.  No appreciable pneumothorax. No acute osseous  abnormality.       Impression    IMPRESSION:   1. Remaining supportive devices are in stable positioning.  2. Increasing pulmonary interstitial edema and right pleural effusion.  3. The left costophrenic angle is collimated outside the field of  view.       =========================================

## 2022-07-21 NOTE — PROGRESS NOTES
07/21/22 1400   Quick Adds   Type of Visit Occupational Therapy Re-evaluation   Living Environment   Living Environment Comments Refer to initial eval.   Self-Care   Activity/Exercise/Self-Care Comment Refer to initial eval.   Instrumental Activities of Daily Living (IADL)   IADL Comments Refer to inital eval   General Information   Referring Physician Genny Frye   Patient/Family Therapy Goal Statement (OT) To get stronger.   Additional Occupational Profile Info/Pertinent History of Current Problem Problem Dandy Sands is a 61 year old male with a PMH of CAD s/p PCI to LAD, MI, ICM, acute on chronic heart failure, s/p CRT-D, severe MR, antiphospholipid antibody syndrome on chronic warfarin, SLE, HTN, HLD, recent hospitalization 5/16-5/26 s/p RCA, LAD stenting who underwent RVAD (29F Protek duo RIJ) LVAD placement (HeartMate 3) on 7/8/22 by Dr. Zamora. Intraoperative course complicated by IABP dysfunction and RV failure, requiring crash onto bypass / vasopressor support, NO, and protek placement. Now s/p chest closure and NO wean 7/11. Return to OR for hemopericardium (7/12) and chest re-closure 7/13. Protek RVAD removed 7/21.   Existing Precautions/Restrictions cardiac;sternal   Limitations/Impairments safety/cognitive   Left Upper Extremity (Weight-bearing Status) partial weight-bearing (PWB)  (10 lbs sternal)   Right Upper Extremity (Weight-bearing Status) partial weight-bearing (PWB)  (10 lbs sternal)   Left Lower Extremity (Weight-bearing Status) full weight-bearing (FWB)   Right Lower Extremity (Weight-bearing Status) full weight-bearing (FWB)   Cognitive Status Examination   Orientation Status person   Affect/Mental Status (Cognitive) confused   Follows Commands follows one-step commands;50-74% accuracy;verbal cues/prompting required;repetition of directions required;physical/tactile prompts required;initiation impaired   Safety Deficit severe deficit;ability to follow commands;awareness of need  for assistance;impulsivity;judgment;insight into deficits/self-awareness;problem-solving;safety precautions awareness;safety precautions follow-through/compliance   Executive Function Deficit abstract thinking;impulse control;information processing;initiation;insight/awareness of deficits;judgment;organization/sequencing;planning/decision-making;problem-solving/reasoning;self-monitoring/self-correction   Cognitive Status Comments Pt presenting with decreased cognition this date compared to initial eval. Pt only oriented to person this date. Pt with decreased safety judgement and required significant Vcs to attend and sequence. Pt attempting to get OOB multiple times even after OT asked pt to stay in bed. Plan to continue to assess cognition.   Visual Perception   Impact of Vision Impairment on Function (Vision) Plan to continue to assess.   Sensory   Sensory Quick Adds No deficits were identified   Pain Assessment   Patient Currently in Pain No   Integumentary/Edema   Integumentary/Edema Comments 1+ pitting edema noted, but plan to continue to assess.   Posture   Posture forward head position   Strength Comprehensive (MMT)   General Manual Muscle Testing (MMT) Assessment upper extremity strength deficits identified   Comment, General Manual Muscle Testing (MMT) Assessment Pt presenting with significant decrease in BUE strength, pt required ModA to completed shoulder flexion. Pt presenting with a 3+/5 overall strength to BUE.   Upper Extremity (Manual Muscle Testing)   Upper Extremity: Manual Muscle Testing (MMT) left shoulder strength deficit;right shoulder strength deficit;left scapular strength deficit;right scapular strength deficit;left elbow/forearm strength deficit;right elbow/forearm strength deficit;left wrist strength deficit;right wrist strength deficit   Muscle Tone Assessment   Muscle Tone Quick Adds No deficits were identified   Coordination   Coordination Comments Pt presenting with slow moving  coordination, likely due to decreased strength to BUE.   Bed Mobility   Bed Mobility supine-sit   Comment (Bed Mobility) Pt completed supine to sit with MaxA x2. Pt unable to follow sternal precautions with bed mobility.   Sit-Stand Transfer   Sit/Stand Transfer Comments CL.   Sit-Stand [place] (Transfers) dependent (less than 25% patient effort)   Toilet Transfer   Type (Toilet Transfer) sit-stand   [place] Level (Toilet Transfer) dependent (less than 25% patient effort)   Balance   Balance Assessment sitting balance: static;sitting balance: dynamic;sit to stand balance;standing balance: static;standing balance: dynamic;system impairment contributing to balance disturbance;identified impairments contributing to balance disturbance   Balance Comments Pt with decreased balance throughout session. Pt unable to complete sitting in an upright position.   Activities of Daily Living   BADL Assessment/Intervention bathing;lower body dressing;upper body dressing;grooming;toileting   Bathing Assessment/Intervention   [place] Level (Bathing) dependent (less than 25% patient effort)   Upper Body Dressing Assessment/Training   [place] Level (Upper Body Dressing) dependent (less than 25% patient effort)   Lower Body Dressing Assessment/Training   D[place] Level (Lower Body Dressing) dependent (less than 25% patient effort)   Grooming Assessment/Training   D[place] Level (Grooming) dependent (less than 25% patient effort)   Toileting   [place] Level (Toileting) dependent (less than 25% patient effort)   Clinical Impression   Criteria for Skilled Therapeutic Interventions Met (OT) Yes, treatment indicated   OT Diagnosis Pt presenting with significant decrease in functional related to strength, conditioning, balance, cognition which results in a significant increase in assistance for ADLs and IADLs.   OT Problem List-Impairments impacting ADL problems related to;activity tolerance  impaired;balance;cognition;mobility;strength;pain;post-surgical precautions;postural control   Assessment of Occupational Performance 3-5 Performance Deficits   Identified Performance Deficits dresisng, bathing, toileting, amb, transfers   Planned Therapy Interventions (OT) ADL retraining;IADL retraining;balance training;strengthening;transfer training;home program guidelines;progressive activity/exercise;risk factor education   Clinical Decision Making Complexity (OT) low complexity   Risk & Benefits of therapy have been explained evaluation/treatment results reviewed;risks/benefits reviewed;care plan/treatment goals reviewed;current/potential barriers reviewed;participants included;participants voiced agreement with care plan;patient   Clinical Impression Comments Pt would benefit from continued skilled occupational therapy as pt is well below baseline, requires increased assistance for simple tasks and ADLs/IADLs.   OT Discharge Planning   OT Discharge Recommendation (DC Rec) Transitional Care Facility;Acute Rehab Center-Motivated patient will benefit from intensive, interdisciplinary therapy.  Anticipate will be able to tolerate 3 hours of therapy per day   OT Rationale for DC Rec Pt at this point in time would require increased therapy needs as pt is well below baseline, requiring significant assistance for ADLs/IADLs and mobility. Anticipate pt may progress with time to tolerating 3 hours of therapy a day throughout hospitalization   OT Brief overview of current status CL   Total Evaluation Time (Minutes)   Total Evaluation Time (Minutes) 8   OT Goals   Therapy Frequency (OT) 5 times/wk   OT Predicted Duration/Target Date for Goal Attainment 08/11/22   OT Goals Hygiene/Grooming;Upper Body Dressing;Lower Body Dressing;Toilet Transfer/Toileting;OT Goal 1;OT Goal 2;Cardiac Phase 1;Cognition   OT: Hygiene/Grooming modified independent   OT: Upper Body Dressing Modified independent   OT: Lower Body Dressing  Modified independent   OT: Toilet Transfer/Toileting Modified independent   OT: Home Management Modified independent;with light demand household tasks;within precautions;ambulatory level   OT: Cognitive Patient/caregiver will verbalize understanding of cognitive assessment results/recommendations as needed for safe discharge planning   OT: Understanding of cardiac education to maximize quality of life, condition management, and health outcomes Patient;Verbalize;Demonstrate   OT: Perform aerobic activity with stable cardiovascular response continuous;10 minutes;ambulation;NuStep   OT: Functional/aerobic ambulation tolerance with stable cardiovascular response in order to return to home and community environment Modified independent;Greater than 300 feet   OT: Goal 1 Pt will complete medication management task with 100% accuracy and SBA.   OT: Goal 2 Pt will complete tubshower TRANs with Dewey and AE PRN.

## 2022-07-21 NOTE — PROGRESS NOTES
VAD Shift Summary:    VAD Equipment:  Primary console serial number: [id number]  Backup console serial number: [id number]  Circuit lot number: [id number]  Pump head lot number: [id number]      Patient remains on RVAD, all equipment is functioning and alarms are appropriately set. RPM's 3150 with flow range 1.7 L/min.  Circuit remains free of air, clot and fibrin throughout the circuit.. Cannulas are secure with no bleeding from site. Extremities are warm.    Significant Shift Events: None    Vent settings:  Pt is on HFNC at 35%  Vent Mode: (S) CPAP/PS  (Continuous positive airway pressure with Pressure Support)  FiO2 (%): 35 %  Resp Rate (Set): 12 breaths/min  Tidal Volume (Set, mL): 450 mL  PEEP (cm H2O): 5 cmH2O  Pressure Support (cm H2O): 7 cmH2O  Resp: 24  .    Heparin via IV is running at 1500 unit(s)/hr      Intake/Output Summary (Last 24 hours) at 7/21/2022 0515  Last data filed at 7/21/2022 0400  Gross per 24 hour   Intake 2881.94 ml   Output 3275 ml   Net -393.06 ml       ECHO:  No results found for this or any previous visit.  No results found for this or any previous visit.      CXR:  Recent Results (from the past 24 hour(s))   XR Abdomen Port 1 View    Narrative    EXAM: XR ABDOMEN PORT 1 VIEW  7/20/2022 1:29 PM     HISTORY:  s/p ET removal       TECHNIQUE: Single frontal radiograph of the abdomen    COMPARISON:  CT abdomen 7/12/2022    FINDINGS:   Single AP portable supine view of the abdomen. Enteric tube and stylet  projects over the expected location of the proximal jejunum.  Mediastinal, pericardial, and pleural tube traverse above the  field-of-view. Partially visualized LVAD and drive line. IVC filter  projects over the expected location of IVC.    Nonobstructive bowel gas pattern. No pneumatosis, portal venous gas.      Impression    IMPRESSION:   1. Nonobstructive bowel gas pattern.   2. Enteric tube with tip and stylette project over the expected  location of the proximal jejunum.    I  have personally reviewed the examination and initial interpretation  and I agree with the findings.    MILES KENNY MD         SYSTEM ID:  [id number]       Labs:  Recent Labs   Lab 07/21/22  0332 07/20/22 2322 07/20/22 2102 07/20/22 1948   PH 7.45 7.45 7.45 7.45   PCO2 37 36 33* 34*   PO2 117* 109* 122* 113*   HCO3 25 25 23 24   O2PER 35 35 35 35       Lab Results   Component Value Date    HGB 8.2 (L) 07/21/2022    PHGB <30 07/20/2022     07/21/2022    FIBR 498 (H) 07/20/2022    INR 1.34 (H) 07/20/2022     (HH) 07/20/2022    DD 7.29 (H) 07/20/2022    ANTCH 83 (L) 07/20/2022         Plan is to remove RVAD today.      Anne Gross, RT  ECMO Specialist  7/21/2022 5:15 AM

## 2022-07-22 ENCOUNTER — APPOINTMENT (OUTPATIENT)
Dept: OCCUPATIONAL THERAPY | Facility: CLINIC | Age: 62
DRG: 001 | End: 2022-07-22
Attending: INTERNAL MEDICINE
Payer: COMMERCIAL

## 2022-07-22 ENCOUNTER — APPOINTMENT (OUTPATIENT)
Dept: CARDIOLOGY | Facility: CLINIC | Age: 62
DRG: 001 | End: 2022-07-22
Attending: STUDENT IN AN ORGANIZED HEALTH CARE EDUCATION/TRAINING PROGRAM
Payer: COMMERCIAL

## 2022-07-22 ENCOUNTER — APPOINTMENT (OUTPATIENT)
Dept: PHYSICAL THERAPY | Facility: CLINIC | Age: 62
DRG: 001 | End: 2022-07-22
Attending: INTERNAL MEDICINE
Payer: COMMERCIAL

## 2022-07-22 ENCOUNTER — APPOINTMENT (OUTPATIENT)
Dept: SPEECH THERAPY | Facility: CLINIC | Age: 62
DRG: 001 | End: 2022-07-22
Attending: INTERNAL MEDICINE
Payer: COMMERCIAL

## 2022-07-22 DIAGNOSIS — Z95.811 LVAD (LEFT VENTRICULAR ASSIST DEVICE) PRESENT (H): ICD-10-CM

## 2022-07-22 DIAGNOSIS — I50.22 CHRONIC SYSTOLIC CONGESTIVE HEART FAILURE (H): Primary | ICD-10-CM

## 2022-07-22 LAB
ALBUMIN SERPL BCG-MCNC: 2.9 G/DL (ref 3.5–5.2)
ANION GAP SERPL CALCULATED.3IONS-SCNC: 11 MMOL/L (ref 7–15)
ANION GAP SERPL CALCULATED.3IONS-SCNC: 12 MMOL/L (ref 7–15)
ANION GAP SERPL CALCULATED.3IONS-SCNC: 9 MMOL/L (ref 7–15)
ANION GAP SERPL CALCULATED.3IONS-SCNC: 9 MMOL/L (ref 7–15)
APTT PPP: 118 SECONDS (ref 22–38)
APTT PPP: 66 SECONDS (ref 22–38)
APTT PPP: >240 SECONDS (ref 22–38)
AT III ACT/NOR PPP CHRO: 89 % (ref 85–135)
BACTERIA BLD CULT: NO GROWTH
BASE EXCESS BLDA CALC-SCNC: 2 MMOL/L (ref -9–1.8)
BASE EXCESS BLDA CALC-SCNC: 2.4 MMOL/L (ref -9–1.8)
BASE EXCESS BLDA CALC-SCNC: 2.8 MMOL/L (ref -9–1.8)
BASE EXCESS BLDA CALC-SCNC: 3.7 MMOL/L (ref -9–1.8)
BASOPHILS # BLD AUTO: 0.1 10E3/UL (ref 0–0.2)
BASOPHILS NFR BLD AUTO: 0 %
BASOPHILS NFR BLD AUTO: 1 %
BUN SERPL-MCNC: 22.5 MG/DL (ref 8–23)
BUN SERPL-MCNC: 23.5 MG/DL (ref 8–23)
BUN SERPL-MCNC: 23.9 MG/DL (ref 8–23)
BUN SERPL-MCNC: 24 MG/DL (ref 8–23)
CA-I BLD-MCNC: 4.3 MG/DL (ref 4.4–5.2)
CA-I BLD-MCNC: 4.4 MG/DL (ref 4.4–5.2)
CA-I BLD-MCNC: 4.5 MG/DL (ref 4.4–5.2)
CA-I BLD-MCNC: 4.5 MG/DL (ref 4.4–5.2)
CALCIUM SERPL-MCNC: 8 MG/DL (ref 8.8–10.2)
CALCIUM SERPL-MCNC: 8.2 MG/DL (ref 8.8–10.2)
CALCIUM SERPL-MCNC: 8.3 MG/DL (ref 8.8–10.2)
CALCIUM SERPL-MCNC: 8.3 MG/DL (ref 8.8–10.2)
CHLORIDE SERPL-SCNC: 100 MMOL/L (ref 98–107)
CHLORIDE SERPL-SCNC: 101 MMOL/L (ref 98–107)
CHLORIDE SERPL-SCNC: 101 MMOL/L (ref 98–107)
CHLORIDE SERPL-SCNC: 103 MMOL/L (ref 98–107)
CREAT SERPL-MCNC: 0.66 MG/DL (ref 0.67–1.17)
CREAT SERPL-MCNC: 0.67 MG/DL (ref 0.67–1.17)
CREAT SERPL-MCNC: 0.68 MG/DL (ref 0.67–1.17)
CREAT SERPL-MCNC: 0.71 MG/DL (ref 0.67–1.17)
D DIMER PPP FEU-MCNC: 5.65 UG/ML FEU (ref 0–0.5)
D DIMER PPP FEU-MCNC: 8.19 UG/ML FEU (ref 0–0.5)
DEPRECATED HCO3 PLAS-SCNC: 24 MMOL/L (ref 22–29)
DEPRECATED HCO3 PLAS-SCNC: 24 MMOL/L (ref 22–29)
DEPRECATED HCO3 PLAS-SCNC: 26 MMOL/L (ref 22–29)
DEPRECATED HCO3 PLAS-SCNC: 28 MMOL/L (ref 22–29)
EOSINOPHIL # BLD AUTO: 0.2 10E3/UL (ref 0–0.7)
EOSINOPHIL # BLD AUTO: 0.2 10E3/UL (ref 0–0.7)
EOSINOPHIL # BLD AUTO: 0.3 10E3/UL (ref 0–0.7)
EOSINOPHIL # BLD AUTO: 0.3 10E3/UL (ref 0–0.7)
EOSINOPHIL NFR BLD AUTO: 1 %
EOSINOPHIL NFR BLD AUTO: 2 %
ERYTHROCYTE [DISTWIDTH] IN BLOOD BY AUTOMATED COUNT: 19.5 % (ref 10–15)
ERYTHROCYTE [DISTWIDTH] IN BLOOD BY AUTOMATED COUNT: 19.6 % (ref 10–15)
ERYTHROCYTE [DISTWIDTH] IN BLOOD BY AUTOMATED COUNT: 19.9 % (ref 10–15)
ERYTHROCYTE [DISTWIDTH] IN BLOOD BY AUTOMATED COUNT: 20 % (ref 10–15)
FIBRINOGEN PPP-MCNC: 479 MG/DL (ref 170–490)
FIBRINOGEN PPP-MCNC: 507 MG/DL (ref 170–490)
GFR SERPL CREATININE-BSD FRML MDRD: >90 ML/MIN/1.73M2
GLUCOSE BLDC GLUCOMTR-MCNC: 125 MG/DL (ref 70–99)
GLUCOSE BLDC GLUCOMTR-MCNC: 127 MG/DL (ref 70–99)
GLUCOSE BLDC GLUCOMTR-MCNC: 133 MG/DL (ref 70–99)
GLUCOSE BLDC GLUCOMTR-MCNC: 133 MG/DL (ref 70–99)
GLUCOSE BLDC GLUCOMTR-MCNC: 134 MG/DL (ref 70–99)
GLUCOSE BLDC GLUCOMTR-MCNC: 142 MG/DL (ref 70–99)
GLUCOSE BLDC GLUCOMTR-MCNC: 154 MG/DL (ref 70–99)
GLUCOSE SERPL-MCNC: 128 MG/DL (ref 70–99)
GLUCOSE SERPL-MCNC: 138 MG/DL (ref 70–99)
GLUCOSE SERPL-MCNC: 150 MG/DL (ref 70–99)
GLUCOSE SERPL-MCNC: 150 MG/DL (ref 70–99)
HCO3 BLD-SCNC: 26 MMOL/L (ref 21–28)
HCO3 BLD-SCNC: 26 MMOL/L (ref 21–28)
HCO3 BLD-SCNC: 27 MMOL/L (ref 21–28)
HCO3 BLD-SCNC: 27 MMOL/L (ref 21–28)
HCT VFR BLD AUTO: 24.3 % (ref 40–53)
HCT VFR BLD AUTO: 24.8 % (ref 40–53)
HCT VFR BLD AUTO: 25.1 % (ref 40–53)
HCT VFR BLD AUTO: 25.4 % (ref 40–53)
HGB BLD-MCNC: 7.6 G/DL (ref 13.3–17.7)
HGB BLD-MCNC: 7.7 G/DL (ref 13.3–17.7)
HGB BLD-MCNC: 7.9 G/DL (ref 13.3–17.7)
HGB BLD-MCNC: 7.9 G/DL (ref 13.3–17.7)
HGB FREE PLAS-MCNC: <30 MG/DL
IMM GRANULOCYTES # BLD: 0.4 10E3/UL
IMM GRANULOCYTES # BLD: 0.4 10E3/UL
IMM GRANULOCYTES # BLD: 0.5 10E3/UL
IMM GRANULOCYTES # BLD: 0.6 10E3/UL
IMM GRANULOCYTES NFR BLD: 3 %
IMM GRANULOCYTES NFR BLD: 4 %
INR PPP: 1.25 (ref 0.85–1.15)
INR PPP: 1.3 (ref 0.85–1.15)
INR PPP: 1.34 (ref 0.85–1.15)
INR PPP: 1.35 (ref 0.85–1.15)
LACTATE SERPL-SCNC: 1.8 MMOL/L (ref 0.7–2)
LACTATE SERPL-SCNC: 2.3 MMOL/L (ref 0.7–2)
LACTATE SERPL-SCNC: 2.5 MMOL/L (ref 0.7–2)
LACTATE SERPL-SCNC: 2.5 MMOL/L (ref 0.7–2)
LVEF ECHO: NORMAL
LYMPHOCYTES # BLD AUTO: 1.3 10E3/UL (ref 0.8–5.3)
LYMPHOCYTES # BLD AUTO: 1.5 10E3/UL (ref 0.8–5.3)
LYMPHOCYTES # BLD AUTO: 1.8 10E3/UL (ref 0.8–5.3)
LYMPHOCYTES # BLD AUTO: 2 10E3/UL (ref 0.8–5.3)
LYMPHOCYTES NFR BLD AUTO: 11 %
LYMPHOCYTES NFR BLD AUTO: 11 %
LYMPHOCYTES NFR BLD AUTO: 13 %
LYMPHOCYTES NFR BLD AUTO: 7 %
MAGNESIUM SERPL-MCNC: 1.9 MG/DL (ref 1.7–2.3)
MAGNESIUM SERPL-MCNC: 2.7 MG/DL (ref 1.7–2.3)
MCH RBC QN AUTO: 28.4 PG (ref 26.5–33)
MCH RBC QN AUTO: 28.5 PG (ref 26.5–33)
MCH RBC QN AUTO: 28.6 PG (ref 26.5–33)
MCH RBC QN AUTO: 28.8 PG (ref 26.5–33)
MCHC RBC AUTO-ENTMCNC: 31 G/DL (ref 31.5–36.5)
MCHC RBC AUTO-ENTMCNC: 31.1 G/DL (ref 31.5–36.5)
MCHC RBC AUTO-ENTMCNC: 31.3 G/DL (ref 31.5–36.5)
MCHC RBC AUTO-ENTMCNC: 31.5 G/DL (ref 31.5–36.5)
MCV RBC AUTO: 91 FL (ref 78–100)
MCV RBC AUTO: 93 FL (ref 78–100)
MONOCYTES # BLD AUTO: 1.4 10E3/UL (ref 0–1.3)
MONOCYTES # BLD AUTO: 1.5 10E3/UL (ref 0–1.3)
MONOCYTES # BLD AUTO: 1.5 10E3/UL (ref 0–1.3)
MONOCYTES # BLD AUTO: 1.6 10E3/UL (ref 0–1.3)
MONOCYTES NFR BLD AUTO: 10 %
MONOCYTES NFR BLD AUTO: 10 %
MONOCYTES NFR BLD AUTO: 8 %
MONOCYTES NFR BLD AUTO: 9 %
NEUTROPHILS # BLD AUTO: 10.9 10E3/UL (ref 1.6–8.3)
NEUTROPHILS # BLD AUTO: 11.4 10E3/UL (ref 1.6–8.3)
NEUTROPHILS # BLD AUTO: 12.8 10E3/UL (ref 1.6–8.3)
NEUTROPHILS # BLD AUTO: 14.3 10E3/UL (ref 1.6–8.3)
NEUTROPHILS NFR BLD AUTO: 72 %
NEUTROPHILS NFR BLD AUTO: 73 %
NEUTROPHILS NFR BLD AUTO: 75 %
NEUTROPHILS NFR BLD AUTO: 80 %
NRBC # BLD AUTO: 0 10E3/UL
NRBC # BLD AUTO: 0.1 10E3/UL
NRBC BLD AUTO-RTO: 0 /100
NRBC BLD AUTO-RTO: 1 /100
O2/TOTAL GAS SETTING VFR VENT: 35 %
O2/TOTAL GAS SETTING VFR VENT: 35 %
O2/TOTAL GAS SETTING VFR VENT: 40 %
O2/TOTAL GAS SETTING VFR VENT: 40 %
OXYHGB MFR BLD: 96 % (ref 92–100)
OXYHGB MFR BLD: 98 % (ref 92–100)
PCO2 BLD: 36 MM HG (ref 35–45)
PCO2 BLD: 36 MM HG (ref 35–45)
PCO2 BLD: 37 MM HG (ref 35–45)
PCO2 BLD: 37 MM HG (ref 35–45)
PH BLD: 7.46 [PH] (ref 7.35–7.45)
PH BLD: 7.46 [PH] (ref 7.35–7.45)
PH BLD: 7.48 [PH] (ref 7.35–7.45)
PH BLD: 7.49 [PH] (ref 7.35–7.45)
PHOSPHATE SERPL-MCNC: 3.1 MG/DL (ref 2.5–4.5)
PHOSPHATE SERPL-MCNC: 3.5 MG/DL (ref 2.5–4.5)
PLATELET # BLD AUTO: 278 10E3/UL (ref 150–450)
PLATELET # BLD AUTO: 281 10E3/UL (ref 150–450)
PLATELET # BLD AUTO: 283 10E3/UL (ref 150–450)
PLATELET # BLD AUTO: 342 10E3/UL (ref 150–450)
PO2 BLD: 127 MM HG (ref 80–105)
PO2 BLD: 88 MM HG (ref 80–105)
PO2 BLD: 93 MM HG (ref 80–105)
PO2 BLD: 95 MM HG (ref 80–105)
POTASSIUM SERPL-SCNC: 4 MMOL/L (ref 3.4–5.3)
POTASSIUM SERPL-SCNC: 4.2 MMOL/L (ref 3.4–5.3)
POTASSIUM SERPL-SCNC: 4.3 MMOL/L (ref 3.4–5.3)
POTASSIUM SERPL-SCNC: 4.3 MMOL/L (ref 3.4–5.3)
RBC # BLD AUTO: 2.66 10E6/UL (ref 4.4–5.9)
RBC # BLD AUTO: 2.67 10E6/UL (ref 4.4–5.9)
RBC # BLD AUTO: 2.77 10E6/UL (ref 4.4–5.9)
RBC # BLD AUTO: 2.78 10E6/UL (ref 4.4–5.9)
SODIUM SERPL-SCNC: 136 MMOL/L (ref 136–145)
SODIUM SERPL-SCNC: 136 MMOL/L (ref 136–145)
SODIUM SERPL-SCNC: 137 MMOL/L (ref 136–145)
SODIUM SERPL-SCNC: 139 MMOL/L (ref 136–145)
UFH PPP CHRO-ACNC: 0.25 IU/ML
UFH PPP CHRO-ACNC: 1.08 IU/ML
UFH PPP CHRO-ACNC: <0.1 IU/ML
WBC # BLD AUTO: 14.7 10E3/UL (ref 4–11)
WBC # BLD AUTO: 15.9 10E3/UL (ref 4–11)
WBC # BLD AUTO: 16.7 10E3/UL (ref 4–11)
WBC # BLD AUTO: 17.8 10E3/UL (ref 4–11)

## 2022-07-22 PROCEDURE — 82310 ASSAY OF CALCIUM: CPT | Performed by: SURGERY

## 2022-07-22 PROCEDURE — 82330 ASSAY OF CALCIUM: CPT | Performed by: SURGERY

## 2022-07-22 PROCEDURE — 250N000013 HC RX MED GY IP 250 OP 250 PS 637: Performed by: STUDENT IN AN ORGANIZED HEALTH CARE EDUCATION/TRAINING PROGRAM

## 2022-07-22 PROCEDURE — 85520 HEPARIN ASSAY: CPT | Performed by: THORACIC SURGERY (CARDIOTHORACIC VASCULAR SURGERY)

## 2022-07-22 PROCEDURE — 999N000015 HC STATISTIC ARTERIAL MONITORING DAILY

## 2022-07-22 PROCEDURE — 36415 COLL VENOUS BLD VENIPUNCTURE: CPT | Performed by: SURGERY

## 2022-07-22 PROCEDURE — 250N000011 HC RX IP 250 OP 636: Performed by: SURGERY

## 2022-07-22 PROCEDURE — 93325 DOPPLER ECHO COLOR FLOW MAPG: CPT

## 2022-07-22 PROCEDURE — 250N000011 HC RX IP 250 OP 636: Performed by: ANESTHESIOLOGY

## 2022-07-22 PROCEDURE — 250N000013 HC RX MED GY IP 250 OP 250 PS 637: Performed by: SURGERY

## 2022-07-22 PROCEDURE — 80069 RENAL FUNCTION PANEL: CPT | Performed by: SURGERY

## 2022-07-22 PROCEDURE — 250N000011 HC RX IP 250 OP 636: Performed by: STUDENT IN AN ORGANIZED HEALTH CARE EDUCATION/TRAINING PROGRAM

## 2022-07-22 PROCEDURE — 200N000002 HC R&B ICU UMMC

## 2022-07-22 PROCEDURE — 999N000157 HC STATISTIC RCP TIME EA 10 MIN

## 2022-07-22 PROCEDURE — 85014 HEMATOCRIT: CPT | Performed by: SURGERY

## 2022-07-22 PROCEDURE — 97530 THERAPEUTIC ACTIVITIES: CPT | Mod: GP

## 2022-07-22 PROCEDURE — 85300 ANTITHROMBIN III ACTIVITY: CPT | Performed by: SURGERY

## 2022-07-22 PROCEDURE — 93325 DOPPLER ECHO COLOR FLOW MAPG: CPT | Mod: 26 | Performed by: INTERNAL MEDICINE

## 2022-07-22 PROCEDURE — 85384 FIBRINOGEN ACTIVITY: CPT | Performed by: SURGERY

## 2022-07-22 PROCEDURE — 999N000215 HC STATISTIC HFNC ADULT NON-CPAP

## 2022-07-22 PROCEDURE — 97162 PT EVAL MOD COMPLEX 30 MIN: CPT | Mod: GP

## 2022-07-22 PROCEDURE — 999N000043 HC STATISTIC CTO2 CONT OXYGEN TECH TIME EA 90 MIN

## 2022-07-22 PROCEDURE — 82805 BLOOD GASES W/O2 SATURATION: CPT | Performed by: SURGERY

## 2022-07-22 PROCEDURE — 250N000013 HC RX MED GY IP 250 OP 250 PS 637: Performed by: ANESTHESIOLOGY

## 2022-07-22 PROCEDURE — 85610 PROTHROMBIN TIME: CPT | Performed by: SURGERY

## 2022-07-22 PROCEDURE — 85379 FIBRIN DEGRADATION QUANT: CPT | Performed by: SURGERY

## 2022-07-22 PROCEDURE — 84100 ASSAY OF PHOSPHORUS: CPT | Performed by: SURGERY

## 2022-07-22 PROCEDURE — 83605 ASSAY OF LACTIC ACID: CPT | Performed by: SURGERY

## 2022-07-22 PROCEDURE — 97530 THERAPEUTIC ACTIVITIES: CPT | Mod: GO

## 2022-07-22 PROCEDURE — 83051 HEMOGLOBIN PLASMA: CPT | Performed by: SURGERY

## 2022-07-22 PROCEDURE — 85730 THROMBOPLASTIN TIME PARTIAL: CPT | Performed by: SURGERY

## 2022-07-22 PROCEDURE — 93308 TTE F-UP OR LMTD: CPT | Mod: 26 | Performed by: INTERNAL MEDICINE

## 2022-07-22 PROCEDURE — 87040 BLOOD CULTURE FOR BACTERIA: CPT | Performed by: SURGERY

## 2022-07-22 PROCEDURE — 92526 ORAL FUNCTION THERAPY: CPT | Mod: GN

## 2022-07-22 PROCEDURE — 93321 DOPPLER ECHO F-UP/LMTD STD: CPT | Mod: 26 | Performed by: INTERNAL MEDICINE

## 2022-07-22 PROCEDURE — 83735 ASSAY OF MAGNESIUM: CPT | Performed by: SURGERY

## 2022-07-22 PROCEDURE — 97535 SELF CARE MNGMENT TRAINING: CPT | Mod: GO

## 2022-07-22 PROCEDURE — 250N000013 HC RX MED GY IP 250 OP 250 PS 637: Performed by: INTERNAL MEDICINE

## 2022-07-22 PROCEDURE — 99291 CRITICAL CARE FIRST HOUR: CPT | Mod: 25 | Performed by: INTERNAL MEDICINE

## 2022-07-22 PROCEDURE — 93750 INTERROGATION VAD IN PERSON: CPT | Performed by: INTERNAL MEDICINE

## 2022-07-22 PROCEDURE — 85520 HEPARIN ASSAY: CPT | Performed by: ANESTHESIOLOGY

## 2022-07-22 PROCEDURE — 93308 TTE F-UP OR LMTD: CPT

## 2022-07-22 PROCEDURE — 250N000011 HC RX IP 250 OP 636: Performed by: THORACIC SURGERY (CARDIOTHORACIC VASCULAR SURGERY)

## 2022-07-22 RX ORDER — FUROSEMIDE 10 MG/ML
40 INJECTION INTRAMUSCULAR; INTRAVENOUS ONCE
Status: COMPLETED | OUTPATIENT
Start: 2022-07-22 | End: 2022-07-22

## 2022-07-22 RX ORDER — ATORVASTATIN CALCIUM 40 MG/1
40 TABLET, FILM COATED ORAL EVERY EVENING
Status: DISCONTINUED | OUTPATIENT
Start: 2022-07-22 | End: 2022-08-21 | Stop reason: HOSPADM

## 2022-07-22 RX ORDER — MAGNESIUM SULFATE HEPTAHYDRATE 40 MG/ML
2 INJECTION, SOLUTION INTRAVENOUS ONCE
Status: COMPLETED | OUTPATIENT
Start: 2022-07-23 | End: 2022-07-23

## 2022-07-22 RX ORDER — HEPARIN SODIUM 10000 [USP'U]/100ML
0-5000 INJECTION, SOLUTION INTRAVENOUS CONTINUOUS
Status: DISCONTINUED | OUTPATIENT
Start: 2022-07-22 | End: 2022-07-24

## 2022-07-22 RX ADMIN — HYDROXYCHLOROQUINE SULFATE 200 MG: 200 TABLET, FILM COATED ORAL at 08:45

## 2022-07-22 RX ADMIN — ASPIRIN 81 MG CHEWABLE TABLET 81 MG: 81 TABLET CHEWABLE at 08:45

## 2022-07-22 RX ADMIN — PIPERACILLIN AND TAZOBACTAM 4.5 G: 4; .5 INJECTION, POWDER, LYOPHILIZED, FOR SOLUTION INTRAVENOUS at 06:05

## 2022-07-22 RX ADMIN — ATORVASTATIN CALCIUM 40 MG: 40 TABLET, FILM COATED ORAL at 20:09

## 2022-07-22 RX ADMIN — VALPROIC ACID 500 MG: 250 SOLUTION ORAL at 20:10

## 2022-07-22 RX ADMIN — Medication 1 PACKET: at 08:46

## 2022-07-22 RX ADMIN — INSULIN ASPART 1 UNITS: 100 INJECTION, SOLUTION INTRAVENOUS; SUBCUTANEOUS at 12:52

## 2022-07-22 RX ADMIN — HEPARIN SODIUM AND DEXTROSE 1500 UNITS/HR: 10000; 5 INJECTION INTRAVENOUS at 12:47

## 2022-07-22 RX ADMIN — FUROSEMIDE 40 MG: 10 INJECTION, SOLUTION INTRAVENOUS at 23:28

## 2022-07-22 RX ADMIN — Medication 6.25 MG: at 20:09

## 2022-07-22 RX ADMIN — DULOXETINE 30 MG: 30 CAPSULE, DELAYED RELEASE ORAL at 08:44

## 2022-07-22 RX ADMIN — OXYCODONE HYDROCHLORIDE 10 MG: 10 TABLET ORAL at 06:04

## 2022-07-22 RX ADMIN — Medication 40 MG: at 12:41

## 2022-07-22 RX ADMIN — HYDRALAZINE HYDROCHLORIDE 10 MG: 20 INJECTION INTRAMUSCULAR; INTRAVENOUS at 04:43

## 2022-07-22 RX ADMIN — Medication 1 PACKET: at 08:45

## 2022-07-22 RX ADMIN — ACETAMINOPHEN 975 MG: 325 TABLET, FILM COATED ORAL at 22:06

## 2022-07-22 RX ADMIN — OXYCODONE HYDROCHLORIDE 10 MG: 10 TABLET ORAL at 01:17

## 2022-07-22 RX ADMIN — OXYCODONE HYDROCHLORIDE 10 MG: 10 TABLET ORAL at 15:04

## 2022-07-22 RX ADMIN — HYDROMORPHONE HYDROCHLORIDE 0.4 MG: 0.2 INJECTION, SOLUTION INTRAMUSCULAR; INTRAVENOUS; SUBCUTANEOUS at 06:43

## 2022-07-22 RX ADMIN — ACETAMINOPHEN 975 MG: 325 TABLET, FILM COATED ORAL at 15:03

## 2022-07-22 RX ADMIN — HYDROXYZINE HYDROCHLORIDE 50 MG: 25 TABLET ORAL at 10:46

## 2022-07-22 RX ADMIN — OXYCODONE HYDROCHLORIDE 10 MG: 10 TABLET ORAL at 22:06

## 2022-07-22 RX ADMIN — Medication 6.25 MG: at 08:45

## 2022-07-22 RX ADMIN — FUROSEMIDE 40 MG: 10 INJECTION, SOLUTION INTRAVENOUS at 10:13

## 2022-07-22 RX ADMIN — HYDROXYZINE HYDROCHLORIDE 50 MG: 25 TABLET ORAL at 16:21

## 2022-07-22 RX ADMIN — Medication 1 PACKET: at 20:12

## 2022-07-22 RX ADMIN — MULTIVITAMIN 15 ML: LIQUID ORAL at 08:45

## 2022-07-22 RX ADMIN — VALPROIC ACID 250 MG: 250 SOLUTION ORAL at 12:47

## 2022-07-22 RX ADMIN — HYDROMORPHONE HYDROCHLORIDE 0.4 MG: 0.2 INJECTION, SOLUTION INTRAMUSCULAR; INTRAVENOUS; SUBCUTANEOUS at 02:57

## 2022-07-22 RX ADMIN — Medication 10 MG: at 20:09

## 2022-07-22 RX ADMIN — FLUTICASONE PROPIONATE AND SALMETEROL XINAFOATE 2 PUFF: 115; 21 AEROSOL, METERED RESPIRATORY (INHALATION) at 20:10

## 2022-07-22 RX ADMIN — FUROSEMIDE 40 MG: 10 INJECTION, SOLUTION INTRAVENOUS at 16:21

## 2022-07-22 RX ADMIN — Medication 6.25 MG: at 16:22

## 2022-07-22 RX ADMIN — OXYCODONE HYDROCHLORIDE 10 MG: 10 TABLET ORAL at 18:21

## 2022-07-22 RX ADMIN — PIPERACILLIN AND TAZOBACTAM 4.5 G: 4; .5 INJECTION, POWDER, LYOPHILIZED, FOR SOLUTION INTRAVENOUS at 00:36

## 2022-07-22 RX ADMIN — OXYCODONE HYDROCHLORIDE 5 MG: 5 TABLET ORAL at 20:15

## 2022-07-22 RX ADMIN — HYDROXYCHLOROQUINE SULFATE 200 MG: 200 TABLET, FILM COATED ORAL at 20:12

## 2022-07-22 RX ADMIN — VALPROIC ACID 250 MG: 250 SOLUTION ORAL at 20:10

## 2022-07-22 RX ADMIN — QUETIAPINE FUMARATE 100 MG: 100 TABLET ORAL at 22:06

## 2022-07-22 RX ADMIN — INSULIN GLARGINE 20 UNITS: 100 INJECTION, SOLUTION SUBCUTANEOUS at 08:47

## 2022-07-22 RX ADMIN — ACETAMINOPHEN 975 MG: 325 TABLET, FILM COATED ORAL at 06:05

## 2022-07-22 RX ADMIN — INSULIN ASPART 1 UNITS: 100 INJECTION, SOLUTION INTRAVENOUS; SUBCUTANEOUS at 08:46

## 2022-07-22 RX ADMIN — Medication 1 PACKET: at 15:04

## 2022-07-22 RX ADMIN — OXYCODONE HYDROCHLORIDE 10 MG: 10 TABLET ORAL at 10:12

## 2022-07-22 RX ADMIN — Medication 1 PACKET: at 16:22

## 2022-07-22 ASSESSMENT — ACTIVITIES OF DAILY LIVING (ADL)
ADLS_ACUITY_SCORE: 36

## 2022-07-22 NOTE — PROGRESS NOTES
[facility]    Cardiology Progress Note- Cards 2        Date of Admission:  6/17/2022     Assessment & Plan: HVSL   Dandy EDUARD Sands is a 61 year old male with PMH of lupus, antiphospholipid syndrome, HTN, HLD, HFrEF 2/2 ICM who presented with cardiogenic shock c/b multiorgan failure (JOSE, shock liver) requiring mechanical support with IABP, now s/p HM3 LVAD 7/8/22 by Dr. Zamora with intraoperative course c/b RV failure s/p 29F Protek duo via RIJ. Post op course c/b hemopericardium s/p repeat washout with chest left open. Chest now closed 7/13/22 and decannulated from RVAD 7/21    #HFrEF 2/2 ICM s/p HM3 LVAD in setting of cardiogenic shock (SCAI D)  #RV failure s/p Protek duo temporary RVAD s/p decannulation 7/21  #RV thrombus  #Post chest closure hemopericardium s/p repeat washout (7/12)  Patient with ICM s/p HM3 LVAD 7/8/22 by Dr. Zamora in setting of presentation for cardiogenic shock requiring MCS with IABP as prior to HM (initially evaluated for heart transplant, however noted to have substantially elevated DSAs during course of care, so decision made to proceed with LVAD given patient already on temporary MCS). Intraoperative course c/b RV failure resulting in cardiogenic shock requiring high dose pressors necessitating RVAD support. Chest closed 7/11 and Ashli weaned. However developed tachycardia, lactic acidosis and decreased hemoglobin with CT scan noting hemopericardium. Returned to OR where underwent washout with large clot burden noted over the right atrium and around LVAD outflow graft. Chest left open and returned to ICU where pressors were able to be weaned. Noted to have stable RVAD/LVAD flows throughout and post procedurally. Ultimately returned to OR for repeat closure. Currently hemodynamically stable with stable end-organ function and hemoglobin. Extubated and decannulated from RVAD on 7/21 with good tolerance from hemodynamic profile. Will  continue to monitor closely to optimize LVAD flows and end organ function  -LVAD: continues to have good flows with few alarms with rare PI event alarms; will continue at current speed and monitor for alarms  -RVAD: tolerated RVAD decannulation well; echo today to assess RV fxn--> RV with good function, however also revealing RV thrombus likely 2/2 prior RVAD. Will transition to high intensity heparin  -AC, antiplatelets: continue ASA; heparin as above  -Volume status: appears euvolemic to slightly hypervolemic; lasix x1 this AM; goal net negative 500cc-1L  -rhythm: sinus   -of note, patient with climbing capture threshold of RV lead; will need to be evaluated in future by EP team  -hypertension: stop hydralazine; will continue low dose captopril and continue hydralazine PRN      The patient's care was discussed with the Attending Physician, Dr. Lora, Bedside Nurse and Primary team.    Emelyn Meneses MD  [facility]    ______________________________________________________________________    Interval History   Nursing notes reviewed. Hypotensive episode overnight consistent with vagal type event. This AM, delirious but oriented to self, place, and his children's names. Reports feeling well.     Data reviewed today: I reviewed all medications, new labs and imaging results over the last 24 hours    Physical Exam   Vital Signs: Temp: 98.7  F (37.1  C) Temp src: Axillary  Pulse: 118   Resp: 24 SpO2: 98 % O2 Device: High Flow Nasal Cannula (HFNC) Oxygen Delivery: 40 LPM  Weight: 182 lbs 15.71 oz  General Appearance: Older male in NAD breathing on HFNC  Respiratory: coarse lung sounds, nonlabored  Cardiovascular: vad hum, driveline site c/d/i, chest incisions c/d/i, JVD ~10 cm   GI: soft, bs active   Skin: warm, well perfused, 1+ pitting edema to the mid-tibia  Neuro: awake, alert, interactive, speech fluent    Data   Recent Labs   Lab 07/22/22  0503 07/22/22  9456  07/22/22  0029 07/21/22  2149 07/21/22  1555 07/21/22  1554 07/21/22  0332 07/21/22  0331   WBC  --  16.7*  --  15.1*  --  17.3*   < > 16.4*   HGB  --  7.9*  --  7.7*  --  8.4*   < > 8.2*   MCV  --  91  --  91  --  91   < > 91   PLT  --  278  --  238  --  253   < > 191   INR  --  1.25*  --  1.29*  --  1.26*   < > 1.34*   NA  --  139  --  138  138  --  139   < > 141   POTASSIUM  --  4.3  --  3.9  3.9  --  4.1   < > 4.1   CHLORIDE  --  103  --  102  102  --  104   < > 106   CO2  --  24  --  26  26  --  24   < > 23   BUN  --  22.5  --  25.0*  25.0*  --  25.9*   < > 27.1*   CR  --  0.68  --  0.72  0.72  --  0.74   < > 0.70   ANIONGAP  --  12  --  10  10  --  11   < > 12   ELDA  --  8.3*  --  8.1*  8.1*  --  8.5*   < > 8.2*   * 128* 133* 126*  126*   < > 157*   < > 119*   ALBUMIN  --   --   --  2.7*  --   --   --  2.7*   PROTTOTAL  --   --   --  5.5*  --   --   --  5.6*   BILITOTAL  --   --   --  0.7  --   --   --  0.8   ALKPHOS  --   --   --  155*  --   --   --  145*   ALT  --   --   --  11  --   --   --  12   AST  --   --   --  27  --   --   --  32    < > = values in this interval not displayed.       Medications     dextrose       EPINEPHrine       heparin 500 Units/hr (07/22/22 0600)     norepinephrine Stopped (07/21/22 2319)       acetaminophen  975 mg Oral or Feeding Tube Q8H CAMMY     aspirin  81 mg Oral or Feeding Tube Daily     captopril  6.25 mg Oral TID     DULoxetine  30 mg Oral Daily     fiber modular (NUTRISOURCE FIBER)  1 packet Per Feeding Tube TID     fluticasone-salmeterol  2 puff Inhalation BID     hydrALAZINE  25 mg Oral or Feeding Tube Q8H CAMMY     hydroxychloroquine  200 mg Oral BID     insulin aspart  1-12 Units Subcutaneous Q4H     insulin glargine  20 Units Subcutaneous QAM AC     melatonin  10 mg Oral QPM     multivitamins w/minerals  15 mL Per Feeding Tube Daily     oxyCODONE  10 mg Oral or Feeding Tube Q4H     pantoprazole  40 mg Per Feeding Tube QAM AC     piperacillin-tazobactam   4.5 g Intravenous Q6H     protein modular  1 packet Per Feeding Tube TID     QUEtiapine  100 mg Oral or Feeding Tube At Bedtime     sodium chloride (PF)  3 mL Intracatheter Q8H     sodium chloride (PF)  3 mL Intracatheter Q8H     valproic acid  250 mg Oral BID     valproic acid  500 mg Oral QPM       TTE 7/17/2022:  HM3 LVAD at 5200 RPM and Protek Duo RVAD at 4.3L/min.  Normal LV size with severely reduced global LV function, LVEF<20%. LVIDd= 4.6  cm.  Normal RV size and mildly reduced RV function, RVFAC 31%. The ventricular  septum is midline.  The aortic valve opens minimally with each beat. There is no AI.  LVAD inflow cannula is visualized in the LV apex. Normal Doppler interrogation  of the LVAD inflow cannula.  LVAD outflow graft is not well assessed.  Organized material is present in the pericardial space, most prominently  anterior to the RV free wall. There is no evidence of pericardial tamponade.  This was also present on the 7/12/22 study, but is better imaged on today's  study.     This study was compared with the study from 7/12/22: Image quality is  significantly improved. There has otherwise been no significant change,  however the prior study was extremely limited.

## 2022-07-22 NOTE — PROGRESS NOTES
VAD Social Work Services Progress Note      Date of Initial Social Work Evaluation: 5/18/2022 with admission assessment completed on 6/20/2022  Collaborated with: Patient and Son-Nicholas    Data: Dandy remains in ICU status post-LVAD and RVAD. The RVAD was removed yesterday and Dandy was extubated. Son and his Sig Other were visiting in the hospital today when writer on the floor. Family able to finally get into long-term [address] today. Patient's Dtr-Asha at home in [place].  Intervention: Met with Dandy and son in his room. Provided emotional support and inquired into questions/concerns. Talked about the process of getting into the apartment.  Assessment: Dandy has been extubated and is awake. No current psychosocial concerns. Coping appears to be appropriate.  Education provided by SW: Ongoing SW availability  Plan:    Discharge Plans in Progress: [facility] TCU/[facility]U    Barriers to d/c plan: medical condition    Follow up Plan: SW will continue to follow for ongoing psychosocial support post-VAD.

## 2022-07-22 NOTE — PLAN OF CARE
"Major Shift Events:  Patient was bearing down hard in an attempt to have a bm when his blood pressure dropped significantly. CVTS was notified, There was also a drop in the LVAD P.I's. Patient was extremely difficult to arouse, pale, and warm to the touch. Labs, blood cultures, and urine cultures were sent. Norepi was used to support for about one hour.  Mentation waxes and wanes. Currently only oriented to self, but speech is clear and logical. Complains of pain in his shoulder. Maps as high as 100. CVTS notified. One does prn Hydralazine given, Ordered to hold hydralazine as he was on pressors earlier  Plan: Continue to monitor blood pressure, Increase activity  For vital signs and complete assessments, please see documentation flowsheets.    Problem: Plan of Care - These are the overarching goals to be used throughout the patient stay.    Goal: Plan of Care Review/Shift Note  Description: The Plan of Care Review/Shift note should be completed every shift.  The Outcome Evaluation is a brief statement about your assessment that the patient is improving, declining, or no change.  This information will be displayed automatically on your shift note.  Outcome: Ongoing, Progressing  Goal: Patient-Specific Goal (Individualized)  Description: You can add care plan individualizations to a care plan. Examples of Individualization might be:  \"Parent requests to be called daily at 9am for status\", \"I have a hard time hearing out of my right ear\", or \"Do not touch me to wake me up as it startles me\".  Outcome: Ongoing, Progressing  Goal: Absence of Hospital-Acquired Illness or Injury  Outcome: Ongoing, Not Progressing  Intervention: Identify and Manage Fall Risk  Recent Flowsheet Documentation  Taken 7/22/2022 0400 by Verna Harris RN  Safety Promotion/Fall Prevention:   activity supervised   clutter free environment maintained   fall prevention program maintained   nonskid shoes/slippers when out of bed   room " organization consistent   safety round/check completed  Taken 7/22/2022 0000 by Verna Harris RN  Safety Promotion/Fall Prevention:   activity supervised   clutter free environment maintained   fall prevention program maintained   nonskid shoes/slippers when out of bed   room organization consistent   safety round/check completed  Taken 7/21/2022 2000 by Verna Harris RN  Safety Promotion/Fall Prevention:   activity supervised   clutter free environment maintained   fall prevention program maintained   nonskid shoes/slippers when out of bed   room organization consistent   safety round/check completed  Intervention: Prevent Skin Injury  Recent Flowsheet Documentation  Taken 7/22/2022 0600 by Verna Harris RN  Body Position:   left   turned   head repositioned, left   upper extremity elevated  Taken 7/22/2022 0400 by Verna Harris RN  Body Position:   left   turned   lower extremity elevated   upper extremity elevated  Taken 7/22/2022 0200 by Verna Harris RN  Body Position:   right   turned   lower extremity elevated   upper extremity elevated  Taken 7/22/2022 0000 by Verna Harris RN  Body Position:   left   turned   lower extremity elevated   upper extremity elevated  Taken 7/21/2022 2200 by Verna Harris RN  Body Position:   right   turned   lower extremity elevated   upper extremity elevated  Taken 7/21/2022 2000 by Verna Harris RN  Body Position:   left   turned   lower extremity elevated   upper extremity elevated  Intervention: Prevent and Manage VTE (Venous Thromboembolism) Risk  Recent Flowsheet Documentation  Taken 7/22/2022 0400 by Verna Harris RN  VTE Prevention/Management: SCDs (sequential compression devices) off  Activity Management: bedrest  Taken 7/22/2022 0000 by Verna Harris RN  VTE Prevention/Management: SCDs (sequential compression devices) off  Activity Management: bedrest  Taken 7/21/2022 2000 by Verna Harris RN  VTE Prevention/Management: SCDs  (sequential compression devices) off  Activity Management: bedrest  Intervention: Prevent Infection  Recent Flowsheet Documentation  Taken 7/22/2022 0400 by Verna Harris RN  Infection Prevention:   environmental surveillance performed   equipment surfaces disinfected   hand hygiene promoted   rest/sleep promoted   single patient room provided  Taken 7/22/2022 0000 by Verna Harris RN  Infection Prevention:   environmental surveillance performed   equipment surfaces disinfected   hand hygiene promoted   rest/sleep promoted   single patient room provided  Taken 7/21/2022 2000 by Verna Harris RN  Infection Prevention:   environmental surveillance performed   equipment surfaces disinfected   hand hygiene promoted   rest/sleep promoted   single patient room provided  Goal: Optimal Comfort and Wellbeing  Outcome: Ongoing, Not Progressing  Goal: Readiness for Transition of Care  Outcome: Ongoing, Not Progressing   Goal Outcome Evaluation:

## 2022-07-22 NOTE — PROGRESS NOTES
07/22/22 1100   Quick Adds   Type of Visit Initial PT Evaluation   Living Environment   People in Home alone   Current Living Arrangements house   Home Accessibility stairs to enter home   Number of Stairs, Main Entrance 3   Stair Railings, Main Entrance none   Transportation Anticipated family or friend will provide   Living Environment Comments Patient lives alone with cat in single level home, 3 THANG.   Self-Care   Usual Activity Tolerance moderate   Current Activity Tolerance poor   Regular Exercise Yes   Activity/Exercise Type biking   Equipment Currently Used at Home none   Activity/Exercise/Self-Care Comment Patient is independent with all mobility and self cares at baseline.   General Information   Onset of Illness/Injury or Date of Surgery 07/08/22   Referring Physician Miguel Iglesias MD   Pertinent History of Current Problem (include personal factors and/or comorbidities that impact the POC) Dandy Sands is a 61 year old male with a PMH of CAD s/p PCI to LAD, MI, ICM, acute on chronic heart failure, s/p CRT-D, severe MR, antiphospholipid antibody syndrome on chronic warfarin, SLE, HTN, HLD, recent hospitalization 5/16-5/26 s/p RCA, LAD stenting who underwent RVAD (29F Protek duo RIJ) LVAD placement (HeartMate 3) on 7/8/22 by Dr. Zamora. Intraoperative course complicated by IABP dysfunction and RV failure, requiring crash onto bypass / vasopressor support, NO, and protek placement. Now s/p chest closure and NO wean 7/11. Return to OR for hemopericardium (7/12) and chest re-closure 7/13. Protek RVAD removed 7/21.   Existing Precautions/Restrictions sternal;fall;cardiac   Cognition   Affect/Mental Status (Cognition) confused;low arousal/lethargic   Orientation Status (Cognition) disoriented to;place;time;situation   Follows Commands (Cognition) follows one-step commands;75-90% accuracy;delayed response/completion;increased processing time needed;physical/tactile prompts required;repetition of directions  required;verbal cues/prompting required   Safety Deficit (Cognition) ability to follow commands;awareness of need for assistance;insight into deficits/self-awareness   Pain Assessment   Patient Currently in Pain Yes, see Vital Sign flowsheet   Posture    Posture Protracted shoulders   Range of Motion (ROM)   ROM Comment BLE WFL   Strength Comprehensive (MMT)   Comment, General Manual Muscle Testing (MMT) Assessment generalized weakness and deconditioning   Bed Mobility   Comment, (Bed Mobility) supine>sit mod Ax2   Transfers   Comment, (Transfers) sit<>stand mod Ax2   Gait/Stairs (Locomotion)   Comment, (Gait/Stairs) Unable to take steps in room, deferred gait   Balance   Balance Comments fair/poor sitting balance, poor standing balance   Sensory Examination   Sensory Perception patient reports no sensory changes   Clinical Impression   Criteria for Skilled Therapeutic Intervention Yes, treatment indicated   PT Diagnosis (PT) impaired functional mobility   Influenced by the following impairments strength, balance, activity tolerance, sternal precautions   Functional limitations due to impairments bed mobility, transfers, gait, stairs, functional endurance   Clinical Presentation (PT Evaluation Complexity) Evolving/Changing   Clinical Presentation Rationale PMH/comorbidities, clinical judgemnet   Clinical Decision Making (Complexity) moderate complexity   Planned Therapy Interventions (PT) balance training;bed mobility training;gait training;patient/family education;stair training;strengthening;transfer training;progressive activity/exercise;risk factor education   Risk & Benefits of therapy have been explained evaluation/treatment results reviewed;care plan/treatment goals reviewed;risks/benefits reviewed;participants voiced agreement with care plan;participants included;patient   PT Discharge Planning   PT Discharge Recommendation (DC Rec) Transitional Care Facility;Acute Rehab Center-Motivated patient will benefit  from intensive, interdisciplinary therapy.  Anticipate will be able to tolerate 3 hours of therapy per day   PT Rationale for DC Rec Patient well below independent baseline, will require continued therapy to maximize functional status. Pending progress may be good ARU candidate.   Plan of Care Review   Plan of Care Reviewed With patient   Total Evaluation Time   Total Evaluation Time (Minutes) 10   Physical Therapy Goals   PT Frequency 6x/week   PT Predicted Duration/Target Date for Goal Attainment 08/12/22   PT Goals Bed Mobility;Transfers;Gait;Stairs   PT: Bed Mobility Modified independent;Supine to/from sit;Within precautions   PT: Transfers Modified independent;Sit to/from stand;Within precautions   PT: Gait Modified independent;Greater than 200 feet   PT: Stairs Modified independent;3 stairs

## 2022-07-22 NOTE — OP NOTE
Procedure Date: 2022    PREOPERATIVE DIAGNOSIS:  Dilated cardiomyopathy, status post HeartMate 3 LVAD, RVAD with open chest.    POSTOPERATIVE DIAGNOSIS:  Dilated cardiomyopathy, status post HeartMate 3 LVAD, RVAD with open chest.    PROCEDURE:  Chest closure.    SURGEON:  Darnell Morgan MD    ASSISTANTS:  Miguel Siegel, Ann Wharton.    OPERATIVE INDICATIONS:  The patient is a 61-year-old gentleman who underwent HeartMate 3 LVAD placement along with RVAD placement.  Decision was made to proceed with chest closure.    DESCRIPTION OF PROCEDURE:  The patient was brought to the operating room in stable condition with of general anesthesia, the patient's chest, abdomen, lower extremities were prepped and draped in usual manner.  The Esmarch dressing was removed, the Kerlix was removed.  There was no evidence of any active bleeding.  Chest was irrigated with warm antibiotic saline solution.  We also took the oxygenator out of the RVAD.  Sternum was approximated with surgical steel wires.  The patient tolerated this hemodynamically well.  Fascia, subcutaneous tissue, of skin of the wound was closed in layers.  The patient transferred to ICU in stable critical condition.    Darnell Morgan MD        D: 2022   T: 2022   MT: [email or web address]    Name:     OLEG KAUR  MRN:      [id number]        Account:        [id number]   :      1960           Procedure Date: 2022     Document: [id number]

## 2022-07-22 NOTE — PROGRESS NOTES
Major Shift Events:    Neuro: a bit confused this AM cleared more this afternoon, FINCH, weak, Pupils 4-5s  CV: SR-ST, BP stable, afebrile, LVAD WNl, flows 3.5-4.1, index 2.7-5.7 & unger 3.2-3.9 RPM [id number]  Respi: LS dim/ throughout on HFNC 35% & 40L  Gi/Gu:  TF at goal, standard FWF, rectal tube in place, good UOP, 2x lasix given today,     Plan: Work with therapies, transfer to floor?    For vital signs and complete assessments, please see documentation flowsheets.

## 2022-07-22 NOTE — PROGRESS NOTES
D: Stopped by to see patient and introduce self. Pt awake but lethargic. Slightly increased work of breathing. No VAD related questions or concerns at this time. No family at bedside.   I: Discussed POC - will wait to begin VAD education until pt is stronger and more alert.   P: Continue to follow patient and address any questions or concerns patient and or caregiver may have.

## 2022-07-22 NOTE — PROGRESS NOTES
CV ICU PROGRESS NOTE  07/22/2022        CO-MORBIDITIES  1. Cardiogenic shock (H)  (primary encounter diagnosis)  2. Ischemic cardiomyopathy  3. Heart failure with reduced ejection fraction, NYHA class III (H)  4. Coronary artery disease involving native coronary artery of native heart without angina pectoris      ASSESSMENT Dandy Sands is a 61 year old male with a PMH of CAD s/p PCI to LAD, MI, ICM, acute on chronic heart failure, s/p CRT-D, severe MR, antiphospholipid antibody syndrome on chronic warfarin, SLE, HTN, HLD, recent hospitalization 5/16-5/26 s/p RCA, LAD stenting who underwent RVAD (29F Protek duo RIJ) LVAD placement (HeartMate 3) on 7/8/22 by Dr. Zamora. Intraoperative course complicated by IABP dysfunction and RV failure, requiring crash onto bypass / vasopressor support, NO, and protek placement. Now s/p chest closure and NO wean 7/11. Return to OR for hemopericardium (7/12) and chest re-closure 7/13. Protek RVAD removed 7/21.     Today's Progress:  - RV mobile clot found - high intensity heparin started   - Lasix 40 mg x2 for net negative goal of 1L today  - Start statin       Overnight Events:  - Hypotensive overnight, briefly on NE, fluid resuscitation, ECHO, cultured, and CXR  - AM ECHO notable for mobile RV thrombus   - Ongoing delirium         PLAN:   Neuro/ pain/ sedation:  #Acute Postoperative pain  #Depression   #Anxiety due to a medical condition  #Insomnia  Ongoing delirium, intermittently responding appropriately.   - Monitor neurological status. Notify the MD for any acute changes in exam.  - Pain:    -Scheduled tylenol, scheduling oxycodone 10mg q4h.    -PRN dilaudid  - Depression: Duloxetine 30mg  - Sleep: Melatonin 10 mg HS, Seroquel 100 mg HS,   - Anxiety: Valproate po 250 mg BID and 500mg Bedtime   - Hydroxyzine PRN  - PTA meds: hydroxyzine 25mg PRN, zolpidem 5mg, melatonin 3mg, lorazepam 0.5mg  - QTc(7/18)- 683 msec, hence no haldol     Pulmonary:   #Acute hypoxic respiratory  failure on iMV  #Mild-moderate emphysema  #History of COVID-19  Off nitric 7/12, extubated 7/20 to HFNC.  - Wean supplemental O2 as able  - Maintain SpO2> 92%  - Incentive spirometry     Cardiovascular:    #S/p LVAD placement (HeartMate 3) on 7/8/22   #S/p RVAD (29F Protek duo RIJ) on 7/8/22  #S/p chest closure 7/11  #Cardiogenic shock  #Advanced ischemic cardiomyopathy, class IV, stage D  #CAD s/p PCI to LAD (2005)  #S/p CRT-D (2006)  #History of MI (2007)  #Severe MR  #Antiphospholipid antibody syndrome on chronic warfarin  #HTN, HLD  #Recent hospitalization 5/16-5/26 s/p RCA, LAD stenting  #Atrial fibrillation   # RV mobile clot   - Monitor hemodynamic status. Chest closure and oxygenator splicing 7/11. Reopened 7/12 for I&D and left open, closed 7/13. RVAD removed 7/21.  - Goal MAP > 65   - PTA meds(held): atorvastatin 40mg, clopidogrel 75mg, lasix 40mg, warfarin 5mg  - LVAD management per Advanced HF service  - AC Plan: start High intensity Heparin for RV mobile thrombus   - Aspirin 81 mg  - Start Atorvastatin 40mg  - Hydralazine uriel and prn MAP >85 discontinued  - Captopril dose titration per cardiology      GI / Nutrition:   #GERD  #Congestive hepatopathy in the setting of cardiac insuffiencey -resolved  # Hematemesis / oral mucosal bleeding -resolved    - Clear liquid diet, SLP following  - Continue NJT at goal  - Bowel regimen prn (miralax / senna)  - Nutrition consulted  - Trend LFT's every alternate day    Renal/ Fluid Balance:    - Strict I/O, daily weights   - Avoid/limit nephrotoxins as able  - Replete lytes PRN per protocol  - PTA meds: tamsulosin 0.4mg (held)  - Fluid goal: net even to net negative 500cc  - Lasix 40 mg x2 today, goal net negative 1L    Endocrine:    #Stress induced hyperglycemia  Preop A1c 5.5%  - Insulin glargine 20U and HD sliding scale  - Goal BG <180 for optimal healing     ID/ Antibiotics:  #Periopearive antibiosis (s/p course of Cefepime, vancomycin, rifampin,  fluconazole)  #HAP - Enterococcus faecalis PNA  - Discontinue Cefepime and Vancomycin (7/13-7/14), chest now closed   - Trend fever curve   - Pan culture (7/12): Bcx x2, UA/UC -> negative     -7/14 Endotracheal sputum culture (4+ candida albicans, 1+ E.Coli, 3+ enterococcus faecalis)  - 7/18 C.diff- negative  - Zosyn for HAP(7/15-7/22)    Heme:     #Acute blood loss anemia  #Acute blood loss thrombocytopenia  #History of DVT and PE   #Antiphospholipid antibody syndrome  #History of iron deficiency anemia  - Monitor for s/sx of bleeding  - Trend CBC and coags.  - Hgb goal >8  - Plt goal > 20    Rheumatology:  #SLE  - Chronic, symptoms include arthritis, photosensitivity, fatigue, and skin manifestations  - PTA meds: hydroxychloroquine 200mg, resumed 7/16  - Rheumatology consulted and following. Follow dsDNA and complement levels(7/18).    -Will need cellcept restarted when appropriate.    Prophylaxis:    - DVT prophylaxis: SCD + high intensity heparin  - Aspirin 81 mg  - PPI  - PT/OT    Lines / Tubes / Drains:  - R triple lumen PICC(6/17-)  - Left Axillary A-line(7/18-)  - Fitzgerald(7/8-)  - NJT(7/11-)  - Chest Tubes(7/8-)  - Rectal tube      Disposition:  - CVICU.    Patient seen, findings and plan discussed with CV ICU staff, Dr. Davidson and CVICU fellow, Dr. Agus Grover.    Rashard Stephenson MD  Anesthesia, PGY3    ----------------------------------------------------------------      Subjective:  Mentioned as above.    OBJECTIVE:   1. VITAL SIGNS:   Temp:  [97.4  F (36.3  C)-98.7  F (37.1  C)] 98.7  F (37.1  C)  Pulse:  [102-121] 121  Resp:  [16-43] 24  MAP:  [56 mmHg-100 mmHg] 91 mmHg  Arterial Line BP: ()/(49-94) 104/80  FiO2 (%):  [35 %-40 %] 35 %  SpO2:  [94 %-100 %] 98 %  FiO2 (%): 35 %  Resp: 24      2. INTAKE/ OUTPUT:   I/O last 3 completed shifts:  In: 2969.87 [P.O.:120; I.V.:899.87; NG/GT:500]  Out: 3105 [Urine:2165; Stool:300; Chest Tube:640]    3. PHYSICAL EXAMINATION:   General: critically ill,  improving  Neuro: follow commands, off sedation, ongoing intermittent agitation/delerium  Resp: on HFNC  CV: tachycardic  Abdomen: Soft, mildly distended, Non-tender  Incisions: c/d/i  Extremities: warm and well perfused    4. INVESTIGATIONS:   Arterial Blood Gases   Recent Labs   Lab 07/22/22  0500 07/22/22  0025 07/21/22 2218 07/21/22 2022   PH 7.48* 7.46* 7.45 7.49*   PCO2 36 37 38 35   PO2 93 127* 187* 97   HCO3 27 26 27 27     Complete Blood Count   Recent Labs   Lab 07/22/22  0457 07/21/22 2149 07/21/22  1554 07/21/22  1018   WBC 16.7* 15.1* 17.3* 17.2*   HGB 7.9* 7.7* 8.4* 8.4*    238 253 235     Basic Metabolic Panel  Recent Labs   Lab 07/22/22  0503 07/22/22  0457 07/22/22  0029 07/21/22 2149 07/21/22  1555 07/21/22  1554 07/21/22  1154 07/21/22  1018   NA  --  139  --  138  138  --  139  --  141   POTASSIUM  --  4.3  --  3.9  3.9  --  4.1  --  3.9   CHLORIDE  --  103  --  102  102  --  104  --  105   CO2  --  24  --  26  26  --  24  --  23   BUN  --  22.5  --  25.0*  25.0*  --  25.9*  --  24.5*   CR  --  0.68  --  0.72  0.72  --  0.74  --  0.70   * 128* 133* 126*  126*   < > 157*   < > 168*    < > = values in this interval not displayed.     Liver Function Tests  Recent Labs   Lab 07/22/22  0457 07/21/22 2149 07/21/22  1554 07/21/22  1018 07/21/22  0331 07/20/22  0949 07/20/22  0355 07/19/22  0946 07/19/22  0322 07/18/22 2118 07/18/22  1526   AST  --  27  --   --  32  --   --   --  24  --  23   ALT  --  11  --   --  12  --   --   --  11  --  12   ALKPHOS  --  155*  --   --  145*  --   --   --  146*  --  187*   BILITOTAL  --  0.7  --   --  0.8  --   --   --  0.6  --  0.7   ALBUMIN  --  2.7*  --   --  2.7*  --  2.7*  --  2.5*  --  2.3*   INR 1.25* 1.29* 1.26* 1.31* 1.34*   < > 1.35*   < > 1.35*   < > 1.31*    < > = values in this interval not displayed.     Pancreatic Enzymes  No lab results found in last 7 days.  Coagulation Profile  Recent Labs   Lab 07/22/22  0457 07/21/22  2968  07/21/22  1554 07/21/22  1018   INR 1.25* 1.29* 1.26* 1.31*   PTT 66* 65* 59* 61*         5. RADIOLOGY:   Recent Results (from the past 24 hour(s))   XR Chest Port 1 View    Narrative    EXAM: XR CHEST PORT 1 VIEW  7/21/2022 10:08 PM     HISTORY:  resp distress       COMPARISON:  Earlier same day chest radiograph    FINDINGS: Single view of the chest. Postsurgical changes of the chest  with intact median sternotomy wires. Enteric tube courses below the  diaphragm and beyond the field-of-view. Right upper extremity PICC tip  projects over the high SVC. Left midline catheter tip projects over  the axilla. Stable left chest wall cardiac device and LVAD. Stable  mediastinal drains and left basilar chest tube.     Stable enlarged cardiac silhouette. Small bilateral pleural effusions.  No appreciable pneumothorax. Similar bilateral diffuse mixed  interstitial and airspace opacities.      Impression    IMPRESSION:   1. No significant change in small pleural effusions and bilateral  diffuse pulmonary opacities.  2. Right IJ ECMO cannula has been removed. Additional support devices  are stable.    I have personally reviewed the examination and initial interpretation  and I agree with the findings.    LAI HERNADEZ MD         SYSTEM ID:  [id number]       =========================================

## 2022-07-23 ENCOUNTER — APPOINTMENT (OUTPATIENT)
Dept: GENERAL RADIOLOGY | Facility: CLINIC | Age: 62
DRG: 001 | End: 2022-07-23
Attending: STUDENT IN AN ORGANIZED HEALTH CARE EDUCATION/TRAINING PROGRAM
Payer: COMMERCIAL

## 2022-07-23 ENCOUNTER — APPOINTMENT (OUTPATIENT)
Dept: ULTRASOUND IMAGING | Facility: CLINIC | Age: 62
DRG: 001 | End: 2022-07-23
Attending: STUDENT IN AN ORGANIZED HEALTH CARE EDUCATION/TRAINING PROGRAM
Payer: COMMERCIAL

## 2022-07-23 ENCOUNTER — APPOINTMENT (OUTPATIENT)
Dept: CARDIOLOGY | Facility: CLINIC | Age: 62
DRG: 001 | End: 2022-07-23
Attending: STUDENT IN AN ORGANIZED HEALTH CARE EDUCATION/TRAINING PROGRAM
Payer: COMMERCIAL

## 2022-07-23 ENCOUNTER — APPOINTMENT (OUTPATIENT)
Dept: GENERAL RADIOLOGY | Facility: CLINIC | Age: 62
DRG: 001 | End: 2022-07-23
Attending: THORACIC SURGERY (CARDIOTHORACIC VASCULAR SURGERY)
Payer: COMMERCIAL

## 2022-07-23 ENCOUNTER — APPOINTMENT (OUTPATIENT)
Dept: SPEECH THERAPY | Facility: CLINIC | Age: 62
DRG: 001 | End: 2022-07-23
Attending: INTERNAL MEDICINE
Payer: COMMERCIAL

## 2022-07-23 LAB
ALBUMIN SERPL BCG-MCNC: 3 G/DL (ref 3.5–5.2)
ALP SERPL-CCNC: 199 U/L (ref 40–129)
ALT SERPL W P-5'-P-CCNC: 12 U/L (ref 10–50)
ANION GAP SERPL CALCULATED.3IONS-SCNC: 10 MMOL/L (ref 7–15)
ANION GAP SERPL CALCULATED.3IONS-SCNC: 10 MMOL/L (ref 7–15)
ANION GAP SERPL CALCULATED.3IONS-SCNC: 9 MMOL/L (ref 7–15)
ANION GAP SERPL CALCULATED.3IONS-SCNC: 9 MMOL/L (ref 7–15)
APTT PPP: 105 SECONDS (ref 22–38)
APTT PPP: 179 SECONDS (ref 22–38)
APTT PPP: 227 SECONDS (ref 22–38)
APTT PPP: 74 SECONDS (ref 22–38)
APTT PPP: >240 SECONDS (ref 22–38)
AST SERPL W P-5'-P-CCNC: 28 U/L (ref 10–50)
AT III ACT/NOR PPP CHRO: 91 % (ref 85–135)
ATRIAL RATE - MUSE: 43 BPM
BACTERIA BLD CULT: NO GROWTH
BASE EXCESS BLDA CALC-SCNC: 1.4 MMOL/L (ref -9–1.8)
BASE EXCESS BLDA CALC-SCNC: 4.8 MMOL/L (ref -9–1.8)
BASE EXCESS BLDA CALC-SCNC: 4.9 MMOL/L (ref -9–1.8)
BASE EXCESS BLDV CALC-SCNC: 4.1 MMOL/L (ref -7.7–1.9)
BASOPHILS # BLD AUTO: 0 10E3/UL (ref 0–0.2)
BASOPHILS # BLD AUTO: 0.1 10E3/UL (ref 0–0.2)
BASOPHILS NFR BLD AUTO: 0 %
BASOPHILS NFR BLD AUTO: 1 %
BILIRUB DIRECT SERPL-MCNC: 0.28 MG/DL (ref 0–0.3)
BILIRUB SERPL-MCNC: 0.6 MG/DL
BUN SERPL-MCNC: 21.8 MG/DL (ref 8–23)
BUN SERPL-MCNC: 22.7 MG/DL (ref 8–23)
BUN SERPL-MCNC: 23.2 MG/DL (ref 8–23)
BUN SERPL-MCNC: 25.4 MG/DL (ref 8–23)
CA-I BLD-MCNC: 4.2 MG/DL (ref 4.4–5.2)
CA-I BLD-MCNC: 4.2 MG/DL (ref 4.4–5.2)
CA-I BLD-MCNC: 4.3 MG/DL (ref 4.4–5.2)
CA-I BLD-MCNC: 4.4 MG/DL (ref 4.4–5.2)
CALCIUM SERPL-MCNC: 8 MG/DL (ref 8.8–10.2)
CALCIUM SERPL-MCNC: 8.1 MG/DL (ref 8.8–10.2)
CALCIUM SERPL-MCNC: 8.1 MG/DL (ref 8.8–10.2)
CALCIUM SERPL-MCNC: 8.2 MG/DL (ref 8.8–10.2)
CHLORIDE SERPL-SCNC: 100 MMOL/L (ref 98–107)
CHLORIDE SERPL-SCNC: 101 MMOL/L (ref 98–107)
CHLORIDE SERPL-SCNC: 97 MMOL/L (ref 98–107)
CHLORIDE SERPL-SCNC: 97 MMOL/L (ref 98–107)
CREAT SERPL-MCNC: 0.57 MG/DL (ref 0.67–1.17)
CREAT SERPL-MCNC: 0.57 MG/DL (ref 0.67–1.17)
CREAT SERPL-MCNC: 0.58 MG/DL (ref 0.67–1.17)
CREAT SERPL-MCNC: 0.67 MG/DL (ref 0.67–1.17)
D DIMER PPP FEU-MCNC: 5.9 UG/ML FEU (ref 0–0.5)
D DIMER PPP FEU-MCNC: 6.6 UG/ML FEU (ref 0–0.5)
DEPRECATED HCO3 PLAS-SCNC: 27 MMOL/L (ref 22–29)
DEPRECATED HCO3 PLAS-SCNC: 27 MMOL/L (ref 22–29)
DEPRECATED HCO3 PLAS-SCNC: 30 MMOL/L (ref 22–29)
DEPRECATED HCO3 PLAS-SCNC: 30 MMOL/L (ref 22–29)
DIASTOLIC BLOOD PRESSURE - MUSE: NORMAL MMHG
EOSINOPHIL # BLD AUTO: 0.3 10E3/UL (ref 0–0.7)
EOSINOPHIL # BLD AUTO: 0.4 10E3/UL (ref 0–0.7)
EOSINOPHIL NFR BLD AUTO: 2 %
EOSINOPHIL NFR BLD AUTO: 2 %
EOSINOPHIL NFR BLD AUTO: 3 %
EOSINOPHIL NFR BLD AUTO: 3 %
ERYTHROCYTE [DISTWIDTH] IN BLOOD BY AUTOMATED COUNT: 19.9 % (ref 10–15)
ERYTHROCYTE [DISTWIDTH] IN BLOOD BY AUTOMATED COUNT: 20 % (ref 10–15)
FIBRINOGEN PPP-MCNC: 478 MG/DL (ref 170–490)
FIBRINOGEN PPP-MCNC: 508 MG/DL (ref 170–490)
GFR SERPL CREATININE-BSD FRML MDRD: >90 ML/MIN/1.73M2
GLUCOSE BLDC GLUCOMTR-MCNC: 143 MG/DL (ref 70–99)
GLUCOSE BLDC GLUCOMTR-MCNC: 143 MG/DL (ref 70–99)
GLUCOSE BLDC GLUCOMTR-MCNC: 144 MG/DL (ref 70–99)
GLUCOSE BLDC GLUCOMTR-MCNC: 144 MG/DL (ref 70–99)
GLUCOSE BLDC GLUCOMTR-MCNC: 151 MG/DL (ref 70–99)
GLUCOSE BLDC GLUCOMTR-MCNC: 186 MG/DL (ref 70–99)
GLUCOSE SERPL-MCNC: 117 MG/DL (ref 70–99)
GLUCOSE SERPL-MCNC: 141 MG/DL (ref 70–99)
GLUCOSE SERPL-MCNC: 146 MG/DL (ref 70–99)
GLUCOSE SERPL-MCNC: 151 MG/DL (ref 70–99)
HCO3 BLD-SCNC: 27 MMOL/L (ref 21–28)
HCO3 BLD-SCNC: 29 MMOL/L (ref 21–28)
HCO3 BLD-SCNC: 29 MMOL/L (ref 21–28)
HCO3 BLDV-SCNC: 30 MMOL/L (ref 21–28)
HCT VFR BLD AUTO: 24.9 % (ref 40–53)
HCT VFR BLD AUTO: 25.2 % (ref 40–53)
HCT VFR BLD AUTO: 25.2 % (ref 40–53)
HCT VFR BLD AUTO: 25.8 % (ref 40–53)
HGB BLD-MCNC: 7.8 G/DL (ref 13.3–17.7)
HGB BLD-MCNC: 7.9 G/DL (ref 13.3–17.7)
HGB BLD-MCNC: 7.9 G/DL (ref 13.3–17.7)
HGB BLD-MCNC: 8.1 G/DL (ref 13.3–17.7)
HGB FREE PLAS-MCNC: 50 MG/DL
IMM GRANULOCYTES # BLD: 0.3 10E3/UL
IMM GRANULOCYTES # BLD: 0.4 10E3/UL
IMM GRANULOCYTES NFR BLD: 2 %
IMM GRANULOCYTES NFR BLD: 3 %
INR PPP: 1.19 (ref 0.85–1.15)
INR PPP: 1.23 (ref 0.85–1.15)
INR PPP: 1.27 (ref 0.85–1.15)
INR PPP: 1.28 (ref 0.85–1.15)
INTERPRETATION ECG - MUSE: NORMAL
LACTATE SERPL-SCNC: 1.4 MMOL/L (ref 0.7–2)
LACTATE SERPL-SCNC: 1.7 MMOL/L (ref 0.7–2)
LACTATE SERPL-SCNC: 1.7 MMOL/L (ref 0.7–2)
LACTATE SERPL-SCNC: 1.9 MMOL/L (ref 0.7–2)
LACTATE SERPL-SCNC: 2.2 MMOL/L (ref 0.7–2)
LACTATE SERPL-SCNC: 4.2 MMOL/L (ref 0.7–2)
LYMPHOCYTES # BLD AUTO: 1.3 10E3/UL (ref 0.8–5.3)
LYMPHOCYTES # BLD AUTO: 1.4 10E3/UL (ref 0.8–5.3)
LYMPHOCYTES # BLD AUTO: 1.6 10E3/UL (ref 0.8–5.3)
LYMPHOCYTES # BLD AUTO: 1.7 10E3/UL (ref 0.8–5.3)
LYMPHOCYTES NFR BLD AUTO: 11 %
LYMPHOCYTES NFR BLD AUTO: 11 %
LYMPHOCYTES NFR BLD AUTO: 12 %
LYMPHOCYTES NFR BLD AUTO: 9 %
MAGNESIUM SERPL-MCNC: 2.4 MG/DL (ref 1.7–2.3)
MCH RBC QN AUTO: 28.4 PG (ref 26.5–33)
MCH RBC QN AUTO: 28.6 PG (ref 26.5–33)
MCH RBC QN AUTO: 28.7 PG (ref 26.5–33)
MCH RBC QN AUTO: 28.8 PG (ref 26.5–33)
MCHC RBC AUTO-ENTMCNC: 31.3 G/DL (ref 31.5–36.5)
MCHC RBC AUTO-ENTMCNC: 31.4 G/DL (ref 31.5–36.5)
MCV RBC AUTO: 91 FL (ref 78–100)
MCV RBC AUTO: 91 FL (ref 78–100)
MCV RBC AUTO: 92 FL (ref 78–100)
MCV RBC AUTO: 92 FL (ref 78–100)
MONOCYTES # BLD AUTO: 1.1 10E3/UL (ref 0–1.3)
MONOCYTES # BLD AUTO: 1.2 10E3/UL (ref 0–1.3)
MONOCYTES # BLD AUTO: 1.3 10E3/UL (ref 0–1.3)
MONOCYTES # BLD AUTO: 1.3 10E3/UL (ref 0–1.3)
MONOCYTES NFR BLD AUTO: 8 %
MONOCYTES NFR BLD AUTO: 9 %
NEUTROPHILS # BLD AUTO: 10.6 10E3/UL (ref 1.6–8.3)
NEUTROPHILS # BLD AUTO: 11.1 10E3/UL (ref 1.6–8.3)
NEUTROPHILS # BLD AUTO: 11.9 10E3/UL (ref 1.6–8.3)
NEUTROPHILS # BLD AUTO: 9.6 10E3/UL (ref 1.6–8.3)
NEUTROPHILS NFR BLD AUTO: 73 %
NEUTROPHILS NFR BLD AUTO: 74 %
NEUTROPHILS NFR BLD AUTO: 76 %
NEUTROPHILS NFR BLD AUTO: 77 %
NRBC # BLD AUTO: 0 10E3/UL
NRBC # BLD AUTO: 0.1 10E3/UL
NRBC BLD AUTO-RTO: 0 /100
O2/TOTAL GAS SETTING VFR VENT: 100 %
O2/TOTAL GAS SETTING VFR VENT: 100 %
O2/TOTAL GAS SETTING VFR VENT: 30 %
O2/TOTAL GAS SETTING VFR VENT: 60 %
OXYHGB MFR BLD: 97 % (ref 92–100)
OXYHGB MFR BLD: 98 % (ref 92–100)
OXYHGB MFR BLD: 98 % (ref 92–100)
OXYHGB MFR BLDV: 62 % (ref 70–75)
P AXIS - MUSE: NORMAL DEGREES
PCO2 BLD: 38 MM HG (ref 35–45)
PCO2 BLD: 39 MM HG (ref 35–45)
PCO2 BLD: 43 MM HG (ref 35–45)
PCO2 BLDV: 51 MM HG (ref 40–50)
PH BLD: 7.4 [PH] (ref 7.35–7.45)
PH BLD: 7.47 [PH] (ref 7.35–7.45)
PH BLD: 7.49 [PH] (ref 7.35–7.45)
PH BLDV: 7.38 [PH] (ref 7.32–7.43)
PHOSPHATE SERPL-MCNC: 3.1 MG/DL (ref 2.5–4.5)
PLATELET # BLD AUTO: 282 10E3/UL (ref 150–450)
PLATELET # BLD AUTO: 344 10E3/UL (ref 150–450)
PLATELET # BLD AUTO: 350 10E3/UL (ref 150–450)
PLATELET # BLD AUTO: 368 10E3/UL (ref 150–450)
PO2 BLD: 113 MM HG (ref 80–105)
PO2 BLD: 137 MM HG (ref 80–105)
PO2 BLD: 178 MM HG (ref 80–105)
PO2 BLDV: 37 MM HG (ref 25–47)
POTASSIUM SERPL-SCNC: 3.6 MMOL/L (ref 3.4–5.3)
POTASSIUM SERPL-SCNC: 3.7 MMOL/L (ref 3.4–5.3)
POTASSIUM SERPL-SCNC: 3.9 MMOL/L (ref 3.4–5.3)
POTASSIUM SERPL-SCNC: 4 MMOL/L (ref 3.4–5.3)
PR INTERVAL - MUSE: NORMAL MS
PROT SERPL-MCNC: 6 G/DL (ref 6.4–8.3)
QRS DURATION - MUSE: 162 MS
QT - MUSE: 482 MS
QTC - MUSE: 661 MS
R AXIS - MUSE: -62 DEGREES
RBC # BLD AUTO: 2.71 10E6/UL (ref 4.4–5.9)
RBC # BLD AUTO: 2.75 10E6/UL (ref 4.4–5.9)
RBC # BLD AUTO: 2.78 10E6/UL (ref 4.4–5.9)
RBC # BLD AUTO: 2.83 10E6/UL (ref 4.4–5.9)
SODIUM SERPL-SCNC: 136 MMOL/L (ref 136–145)
SODIUM SERPL-SCNC: 136 MMOL/L (ref 136–145)
SODIUM SERPL-SCNC: 137 MMOL/L (ref 136–145)
SODIUM SERPL-SCNC: 138 MMOL/L (ref 136–145)
SYSTOLIC BLOOD PRESSURE - MUSE: NORMAL MMHG
T AXIS - MUSE: 95 DEGREES
UFH PPP CHRO-ACNC: 0.15 IU/ML
UFH PPP CHRO-ACNC: 0.28 IU/ML
UFH PPP CHRO-ACNC: 0.34 IU/ML
VENTRICULAR RATE- MUSE: 113 BPM
WBC # BLD AUTO: 12.9 10E3/UL (ref 4–11)
WBC # BLD AUTO: 14.1 10E3/UL (ref 4–11)
WBC # BLD AUTO: 14.9 10E3/UL (ref 4–11)
WBC # BLD AUTO: 15.3 10E3/UL (ref 4–11)

## 2022-07-23 PROCEDURE — 93325 DOPPLER ECHO COLOR FLOW MAPG: CPT

## 2022-07-23 PROCEDURE — 85300 ANTITHROMBIN III ACTIVITY: CPT | Performed by: SURGERY

## 2022-07-23 PROCEDURE — 85730 THROMBOPLASTIN TIME PARTIAL: CPT | Performed by: SURGERY

## 2022-07-23 PROCEDURE — 85379 FIBRIN DEGRADATION QUANT: CPT | Performed by: SURGERY

## 2022-07-23 PROCEDURE — 250N000013 HC RX MED GY IP 250 OP 250 PS 637: Performed by: SURGERY

## 2022-07-23 PROCEDURE — 82330 ASSAY OF CALCIUM: CPT | Performed by: SURGERY

## 2022-07-23 PROCEDURE — 85610 PROTHROMBIN TIME: CPT | Performed by: SURGERY

## 2022-07-23 PROCEDURE — 93750 INTERROGATION VAD IN PERSON: CPT | Performed by: INTERNAL MEDICINE

## 2022-07-23 PROCEDURE — 250N000013 HC RX MED GY IP 250 OP 250 PS 637: Performed by: THORACIC SURGERY (CARDIOTHORACIC VASCULAR SURGERY)

## 2022-07-23 PROCEDURE — 999N000155 HC STATISTIC RAPCV CVP MONITORING

## 2022-07-23 PROCEDURE — 83605 ASSAY OF LACTIC ACID: CPT | Performed by: STUDENT IN AN ORGANIZED HEALTH CARE EDUCATION/TRAINING PROGRAM

## 2022-07-23 PROCEDURE — 83735 ASSAY OF MAGNESIUM: CPT | Performed by: THORACIC SURGERY (CARDIOTHORACIC VASCULAR SURGERY)

## 2022-07-23 PROCEDURE — 93325 DOPPLER ECHO COLOR FLOW MAPG: CPT | Mod: 26 | Performed by: INTERNAL MEDICINE

## 2022-07-23 PROCEDURE — 85384 FIBRINOGEN ACTIVITY: CPT | Performed by: SURGERY

## 2022-07-23 PROCEDURE — 250N000011 HC RX IP 250 OP 636: Performed by: STUDENT IN AN ORGANIZED HEALTH CARE EDUCATION/TRAINING PROGRAM

## 2022-07-23 PROCEDURE — 250N000011 HC RX IP 250 OP 636: Performed by: THORACIC SURGERY (CARDIOTHORACIC VASCULAR SURGERY)

## 2022-07-23 PROCEDURE — 71045 X-RAY EXAM CHEST 1 VIEW: CPT | Mod: 26 | Performed by: RADIOLOGY

## 2022-07-23 PROCEDURE — 93308 TTE F-UP OR LMTD: CPT

## 2022-07-23 PROCEDURE — 250N000013 HC RX MED GY IP 250 OP 250 PS 637: Performed by: STUDENT IN AN ORGANIZED HEALTH CARE EDUCATION/TRAINING PROGRAM

## 2022-07-23 PROCEDURE — 85014 HEMATOCRIT: CPT | Performed by: SURGERY

## 2022-07-23 PROCEDURE — 250N000011 HC RX IP 250 OP 636: Performed by: ANESTHESIOLOGY

## 2022-07-23 PROCEDURE — 250N000013 HC RX MED GY IP 250 OP 250 PS 637: Performed by: INTERNAL MEDICINE

## 2022-07-23 PROCEDURE — 250N000011 HC RX IP 250 OP 636: Performed by: SURGERY

## 2022-07-23 PROCEDURE — 87040 BLOOD CULTURE FOR BACTERIA: CPT | Performed by: SURGERY

## 2022-07-23 PROCEDURE — 250N000013 HC RX MED GY IP 250 OP 250 PS 637: Performed by: ANESTHESIOLOGY

## 2022-07-23 PROCEDURE — 71045 X-RAY EXAM CHEST 1 VIEW: CPT | Mod: 77

## 2022-07-23 PROCEDURE — 83051 HEMOGLOBIN PLASMA: CPT | Performed by: SURGERY

## 2022-07-23 PROCEDURE — 84100 ASSAY OF PHOSPHORUS: CPT | Performed by: THORACIC SURGERY (CARDIOTHORACIC VASCULAR SURGERY)

## 2022-07-23 PROCEDURE — 71045 X-RAY EXAM CHEST 1 VIEW: CPT

## 2022-07-23 PROCEDURE — 250N000009 HC RX 250: Performed by: STUDENT IN AN ORGANIZED HEALTH CARE EDUCATION/TRAINING PROGRAM

## 2022-07-23 PROCEDURE — 93321 DOPPLER ECHO F-UP/LMTD STD: CPT | Mod: 26 | Performed by: INTERNAL MEDICINE

## 2022-07-23 PROCEDURE — 200N000002 HC R&B ICU UMMC

## 2022-07-23 PROCEDURE — 999N000248 HC STATISTIC IV INSERT WITH US BY RN

## 2022-07-23 PROCEDURE — 83605 ASSAY OF LACTIC ACID: CPT | Performed by: SURGERY

## 2022-07-23 PROCEDURE — 255N000002 HC RX 255 OP 636: Performed by: INTERNAL MEDICINE

## 2022-07-23 PROCEDURE — 93308 TTE F-UP OR LMTD: CPT | Mod: 26 | Performed by: INTERNAL MEDICINE

## 2022-07-23 PROCEDURE — 82805 BLOOD GASES W/O2 SATURATION: CPT | Performed by: ANESTHESIOLOGY

## 2022-07-23 PROCEDURE — 258N000003 HC RX IP 258 OP 636: Performed by: STUDENT IN AN ORGANIZED HEALTH CARE EDUCATION/TRAINING PROGRAM

## 2022-07-23 PROCEDURE — 92526 ORAL FUNCTION THERAPY: CPT | Mod: GN

## 2022-07-23 PROCEDURE — 82310 ASSAY OF CALCIUM: CPT | Performed by: SURGERY

## 2022-07-23 PROCEDURE — 999N000157 HC STATISTIC RCP TIME EA 10 MIN

## 2022-07-23 PROCEDURE — 93971 EXTREMITY STUDY: CPT | Mod: LT

## 2022-07-23 PROCEDURE — 85520 HEPARIN ASSAY: CPT | Performed by: THORACIC SURGERY (CARDIOTHORACIC VASCULAR SURGERY)

## 2022-07-23 PROCEDURE — 99291 CRITICAL CARE FIRST HOUR: CPT | Mod: 25 | Performed by: INTERNAL MEDICINE

## 2022-07-23 PROCEDURE — 93971 EXTREMITY STUDY: CPT | Mod: 26 | Performed by: STUDENT IN AN ORGANIZED HEALTH CARE EDUCATION/TRAINING PROGRAM

## 2022-07-23 PROCEDURE — 82805 BLOOD GASES W/O2 SATURATION: CPT | Performed by: STUDENT IN AN ORGANIZED HEALTH CARE EDUCATION/TRAINING PROGRAM

## 2022-07-23 PROCEDURE — 36415 COLL VENOUS BLD VENIPUNCTURE: CPT | Performed by: SURGERY

## 2022-07-23 PROCEDURE — 82248 BILIRUBIN DIRECT: CPT | Performed by: NURSE PRACTITIONER

## 2022-07-23 RX ORDER — BUMETANIDE 0.25 MG/ML
4 INJECTION INTRAMUSCULAR; INTRAVENOUS ONCE
Status: COMPLETED | OUTPATIENT
Start: 2022-07-23 | End: 2022-07-23

## 2022-07-23 RX ORDER — CALCIUM GLUCONATE 20 MG/ML
1 INJECTION, SOLUTION INTRAVENOUS ONCE
Status: COMPLETED | OUTPATIENT
Start: 2022-07-23 | End: 2022-07-24

## 2022-07-23 RX ORDER — POTASSIUM CHLORIDE 29.8 MG/ML
20 INJECTION INTRAVENOUS ONCE
Status: COMPLETED | OUTPATIENT
Start: 2022-07-23 | End: 2022-07-23

## 2022-07-23 RX ORDER — FUROSEMIDE 10 MG/ML
40 INJECTION INTRAMUSCULAR; INTRAVENOUS ONCE
Status: COMPLETED | OUTPATIENT
Start: 2022-07-23 | End: 2022-07-23

## 2022-07-23 RX ORDER — OXYCODONE HYDROCHLORIDE 10 MG/1
10 TABLET ORAL EVERY 6 HOURS
Status: DISCONTINUED | OUTPATIENT
Start: 2022-07-23 | End: 2022-07-26

## 2022-07-23 RX ORDER — CAPTOPRIL 12.5 MG/1
12.5 TABLET ORAL 3 TIMES DAILY
Status: DISCONTINUED | OUTPATIENT
Start: 2022-07-23 | End: 2022-07-26

## 2022-07-23 RX ADMIN — INSULIN ASPART 1 UNITS: 100 INJECTION, SOLUTION INTRAVENOUS; SUBCUTANEOUS at 11:51

## 2022-07-23 RX ADMIN — ASPIRIN 81 MG CHEWABLE TABLET 81 MG: 81 TABLET CHEWABLE at 09:15

## 2022-07-23 RX ADMIN — Medication 40 MG: at 09:15

## 2022-07-23 RX ADMIN — INSULIN GLARGINE 20 UNITS: 100 INJECTION, SOLUTION SUBCUTANEOUS at 09:06

## 2022-07-23 RX ADMIN — Medication 1 PACKET: at 09:08

## 2022-07-23 RX ADMIN — HYDROXYZINE HYDROCHLORIDE 50 MG: 25 TABLET ORAL at 05:50

## 2022-07-23 RX ADMIN — Medication 1 PACKET: at 14:03

## 2022-07-23 RX ADMIN — SODIUM CHLORIDE, POTASSIUM CHLORIDE, SODIUM LACTATE AND CALCIUM CHLORIDE 500 ML: 600; 310; 30; 20 INJECTION, SOLUTION INTRAVENOUS at 21:30

## 2022-07-23 RX ADMIN — HYDROXYCHLOROQUINE SULFATE 200 MG: 200 TABLET, FILM COATED ORAL at 09:15

## 2022-07-23 RX ADMIN — QUETIAPINE FUMARATE 100 MG: 100 TABLET ORAL at 21:20

## 2022-07-23 RX ADMIN — HEPARIN SODIUM AND DEXTROSE 1600 UNITS/HR: 10000; 5 INJECTION INTRAVENOUS at 19:00

## 2022-07-23 RX ADMIN — MAGNESIUM SULFATE IN WATER 2 G: 40 INJECTION, SOLUTION INTRAVENOUS at 00:37

## 2022-07-23 RX ADMIN — Medication 10 MG: at 19:48

## 2022-07-23 RX ADMIN — ACETAMINOPHEN 975 MG: 325 TABLET, FILM COATED ORAL at 14:02

## 2022-07-23 RX ADMIN — Medication 1 PACKET: at 19:48

## 2022-07-23 RX ADMIN — CALCIUM GLUCONATE 1 G: 20 INJECTION, SOLUTION INTRAVENOUS at 23:01

## 2022-07-23 RX ADMIN — INSULIN ASPART 1 UNITS: 100 INJECTION, SOLUTION INTRAVENOUS; SUBCUTANEOUS at 09:06

## 2022-07-23 RX ADMIN — INSULIN ASPART 2 UNITS: 100 INJECTION, SOLUTION INTRAVENOUS; SUBCUTANEOUS at 20:04

## 2022-07-23 RX ADMIN — OXYCODONE HYDROCHLORIDE 5 MG: 5 TABLET ORAL at 21:20

## 2022-07-23 RX ADMIN — DULOXETINE 30 MG: 30 CAPSULE, DELAYED RELEASE ORAL at 09:15

## 2022-07-23 RX ADMIN — POTASSIUM CHLORIDE 20 MEQ: 29.8 INJECTION, SOLUTION INTRAVENOUS at 22:11

## 2022-07-23 RX ADMIN — HYDROXYCHLOROQUINE SULFATE 200 MG: 200 TABLET, FILM COATED ORAL at 19:49

## 2022-07-23 RX ADMIN — BUMETANIDE 4 MG: 0.25 INJECTION INTRAMUSCULAR; INTRAVENOUS at 12:37

## 2022-07-23 RX ADMIN — VALPROIC ACID 250 MG: 250 SOLUTION ORAL at 14:02

## 2022-07-23 RX ADMIN — HYDROXYZINE HYDROCHLORIDE 50 MG: 25 TABLET ORAL at 17:44

## 2022-07-23 RX ADMIN — INSULIN ASPART 1 UNITS: 100 INJECTION, SOLUTION INTRAVENOUS; SUBCUTANEOUS at 04:07

## 2022-07-23 RX ADMIN — ACETAMINOPHEN 975 MG: 325 TABLET, FILM COATED ORAL at 05:46

## 2022-07-23 RX ADMIN — Medication 1 PACKET: at 14:04

## 2022-07-23 RX ADMIN — OXYCODONE HYDROCHLORIDE 5 MG: 5 TABLET ORAL at 19:49

## 2022-07-23 RX ADMIN — OXYCODONE HYDROCHLORIDE 10 MG: 10 TABLET ORAL at 02:11

## 2022-07-23 RX ADMIN — OXYCODONE HYDROCHLORIDE 10 MG: 10 TABLET ORAL at 10:46

## 2022-07-23 RX ADMIN — MULTIVITAMIN 15 ML: LIQUID ORAL at 09:15

## 2022-07-23 RX ADMIN — ACETAMINOPHEN 975 MG: 325 TABLET, FILM COATED ORAL at 21:20

## 2022-07-23 RX ADMIN — FUROSEMIDE 40 MG: 10 INJECTION, SOLUTION INTRAVENOUS at 09:15

## 2022-07-23 RX ADMIN — INSULIN ASPART 1 UNITS: 100 INJECTION, SOLUTION INTRAVENOUS; SUBCUTANEOUS at 16:24

## 2022-07-23 RX ADMIN — BUMETANIDE 2 MG/HR: 0.25 INJECTION INTRAMUSCULAR; INTRAVENOUS at 14:56

## 2022-07-23 RX ADMIN — POTASSIUM CHLORIDE 20 MEQ: 29.8 INJECTION, SOLUTION INTRAVENOUS at 11:43

## 2022-07-23 RX ADMIN — VALPROIC ACID 250 MG: 250 SOLUTION ORAL at 09:11

## 2022-07-23 RX ADMIN — OXYCODONE HYDROCHLORIDE 10 MG: 10 TABLET ORAL at 05:46

## 2022-07-23 RX ADMIN — Medication 12.5 MG: at 14:02

## 2022-07-23 RX ADMIN — FLUTICASONE PROPIONATE AND SALMETEROL XINAFOATE 2 PUFF: 115; 21 AEROSOL, METERED RESPIRATORY (INHALATION) at 09:08

## 2022-07-23 RX ADMIN — OXYCODONE HYDROCHLORIDE 10 MG: 5 TABLET ORAL at 16:12

## 2022-07-23 RX ADMIN — Medication 1 PACKET: at 19:49

## 2022-07-23 RX ADMIN — Medication 12.5 MG: at 19:49

## 2022-07-23 RX ADMIN — VALPROIC ACID 500 MG: 250 SOLUTION ORAL at 19:49

## 2022-07-23 RX ADMIN — HEPARIN SODIUM AND DEXTROSE 1450 UNITS/HR: 10000; 5 INJECTION INTRAVENOUS at 05:00

## 2022-07-23 RX ADMIN — Medication 6.25 MG: at 09:08

## 2022-07-23 RX ADMIN — ATORVASTATIN CALCIUM 40 MG: 40 TABLET, FILM COATED ORAL at 19:49

## 2022-07-23 ASSESSMENT — ACTIVITIES OF DAILY LIVING (ADL)
ADLS_ACUITY_SCORE: 36

## 2022-07-23 NOTE — PROGRESS NOTES
This RT contacted about pt having increased WOB    Pt noted to have increased  RR, labored breathing/accessory muscle use    Placed on BiPAP 15/5 12/ 60%, Pt breathing improved, will continue to monitor and wean FiO2

## 2022-07-23 NOTE — PLAN OF CARE
Major Shift Events: Pt Oriented to self, Sleeping intermittently between cares. Afebrile. No LVAD Alarms, WNL. Tube Feedings at Goal. 1 Dose Lasix NOC with okay UA. Small output from Rectal tube.     Plan: Work with Therapies. Notify Primary Team with Changes.    For vital signs and complete assessments, please see documentation flowsheets.

## 2022-07-23 NOTE — PROGRESS NOTES
CV ICU PROGRESS NOTE  07/23/2022        CO-MORBIDITIES  1. Cardiogenic shock (H)  (primary encounter diagnosis)  2. Ischemic cardiomyopathy  3. Heart failure with reduced ejection fraction, NYHA class III (H)  4. Coronary artery disease involving native coronary artery of native heart without angina pectoris      ASSESSMENT Dandy Sands is a 61 year old male with a PMH of CAD s/p PCI to LAD, MI, ICM, acute on chronic heart failure, s/p CRT-D, severe MR, antiphospholipid antibody syndrome on chronic warfarin, SLE, HTN, HLD, recent hospitalization 5/16-5/26 s/p RCA, LAD stenting who underwent RVAD (29F Protek duo RIJ) LVAD placement (HeartMate 3) on 7/8/22 by Dr. Zamora. Intraoperative course complicated by IABP dysfunction and RV failure, requiring crash onto bypass / vasopressor support, NO, and protek placement. Now s/p chest closure and NO wean 7/11. Return to OR for hemopericardium (7/12) and chest re-closure 7/13. Protek RVAD removed 7/21.     Today's Progress:  - Acute-onset increased work of breathing   - CXR, TTE, Diuresis Bumex 4, CVP elevated ~30/ CI ~3   - BiPaP   - Cardiology / CVTS following   - lactate and ABG re-evaluation 1400  - Wean oxycodone to 10mg q6h  - LUE Swelling - DVT US ordered    Overnight Events:  - NAEON  - Heparin gtt briefly held for high Xa, since resumed   - Sleeping between cares, some ongoing delirium       PLAN:   Neuro/ pain/ sedation:  #Acute Postoperative pain  #Depression   #Anxiety due to a medical condition  #Insomnia  #Delerium   Ongoing delirium, intermittently responding appropriately.   - Monitor neurological status. Notify the MD for any acute changes in exam.  - Pain:    -Scheduled tylenol, scheduling oxycodone 10mg q4h.    -PRN dilaudid  - Depression: Duloxetine 30mg  - Sleep: Melatonin 10 mg HS, Seroquel 100 mg HS,   - Anxiety: Valproate po 250 mg BID and 500mg Bedtime   - Hydroxyzine PRN  - PTA meds: hydroxyzine 25mg PRN, zolpidem 5mg, melatonin 3mg, lorazepam  0.5mg  - QTc(7/18)- 683 msec, hence no haldol     Pulmonary:   #Acute hypoxic respiratory failure on iMV  #Mild-moderate emphysema  #History of COVID-19  Off nitric 7/12, extubated 7/20 to HFNC.  - Wean supplemental O2 as able  - Maintain SpO2> 92%  - Incentive spirometry     Cardiovascular:    #S/p LVAD placement (HeartMate 3) on 7/8/22   #S/p RVAD (29F Protek duo RIJ) on 7/8/22  #S/p chest closure 7/11  #Cardiogenic shock  #Advanced ischemic cardiomyopathy, class IV, stage D  #CAD s/p PCI to LAD (2005)  #S/p CRT-D (2006)  #History of MI (2007)  #Severe MR  #Antiphospholipid antibody syndrome on chronic warfarin  #HTN, HLD  #Recent hospitalization 5/16-5/26 s/p RCA, LAD stenting  #Atrial fibrillation   # RV mobile clot   - Monitor hemodynamic status. Chest closure and oxygenator splicing 7/11. Reopened 7/12 for I&D and left open, closed 7/13. RVAD removed 7/21.  - Goal MAP > 65   - PTA meds(held): atorvastatin 40mg, clopidogrel 75mg, lasix 40mg, warfarin 5mg  - LVAD management per Advanced HF service  - AC Plan: High intensity Heparin for RV mobile thrombus   - Aspirin 81 mg  - Atorvastatin 40mg  - Hydralazine uriel and prn MAP >85 discontinued  - Captopril 6.25mg TID dose titration per cardiology      GI / Nutrition:   #GERD  #Congestive hepatopathy in the setting of cardiac insuffiencey -resolved  # Hematemesis / oral mucosal bleeding -resolved    - Full liquid diet, SLP following  - Continue NJT at goal  - Bowel regimen prn (miralax / senna)  - Nutrition consulted  - Trend LFT's every alternate day    Renal/ Fluid Balance:    - Strict I/O, daily weights   - Avoid/limit nephrotoxins as able  - Replete lytes PRN per protocol  - PTA meds: tamsulosin 0.4mg (held)  - Goal net negative ~ 1L    Endocrine:    #Stress induced hyperglycemia  Preop A1c 5.5%  - Insulin glargine 20U and HD sliding scale  - Goal BG <180 for optimal healing     ID/ Antibiotics:  #Periopearive antibiosis (s/p course of Cefepime, vancomycin,  rifampin, fluconazole)  #HAP - Enterococcus faecalis PNA  - Discontinue Cefepime and Vancomycin (7/13-7/14), chest now closed   - Trend fever curve   - Pan culture (7/12): Bcx x2, UA/UC -> negative     -7/14 Endotracheal sputum culture (4+ candida albicans, 1+ E.Coli, 3+ enterococcus faecalis)  - 7/18 C.diff- negative  - Zosyn for HAP(7/15-7/22)    Heme:     #Acute blood loss anemia  #Acute blood loss thrombocytopenia  #History of DVT and PE   #Antiphospholipid antibody syndrome  #History of iron deficiency anemia  - Monitor for s/sx of bleeding  - Trend CBC and coags.  - Hgb goal >8  - Plt goal > 20  - LUE Swelling - DVT US ordered    Rheumatology:  #SLE  - Chronic, symptoms include arthritis, photosensitivity, fatigue, and skin manifestations  - PTA meds: hydroxychloroquine 200mg, resumed 7/16  - Rheumatology consulted and following. Follow dsDNA and complement levels(7/18).    -Will need cellcept restarted when appropriate.    Prophylaxis:    - DVT prophylaxis: SCD + high intensity heparin  - Aspirin 81 mg  - PPI  - PT/OT    Lines / Tubes / Drains:  - R triple lumen PICC(6/17-)  - Left Axillary A-line(7/18-)  - Fitzgerald(7/8-)  - NJT(7/11-)  - Chest Tubes(7/8-)  - Rectal tube      Disposition:  - CVICU.  - ICU daily rounding checklist conducted.  - Family update to be completed by ICU team.     Patient seen, findings and plan discussed with CV ICU staff, Dr. Davidson.    Rashard Stephenson MD  Anesthesia, PGY3    ----------------------------------------------------------------      Subjective:  Mentioned as above.    OBJECTIVE:   1. VITAL SIGNS:   Temp:  [96.9  F (36.1  C)-98.6  F (37  C)] 98.6  F (37  C)  Pulse:  [100-119] 104  Resp:  [14-40] 24  MAP:  [66 mmHg-100 mmHg] 85 mmHg  Arterial Line BP: ()/(58-90) 91/76  FiO2 (%):  [30 %-35 %] 30 %  SpO2:  [93 %-100 %] 97 %  FiO2 (%): 30 %  Resp: 24      2. INTAKE/ OUTPUT:   I/O last 3 completed shifts:  In: 2411.63 [P.O.:120; I.V.:661.63; NG/GT:480]  Out: 3025  [Urine:2225; Stool:400; Chest Tube:400]    3. PHYSICAL EXAMINATION:   General: critically ill, improving  Neuro: follow commands, off sedation, ongoing intermittent agitation/delerium  Resp: on HFNC -> BiPAP  CV: tachycardic  Abdomen: Soft, mildly distended, Non-tender  Incisions: c/d/i  Extremities: warm and well perfused    4. INVESTIGATIONS:   Arterial Blood Gases   Recent Labs   Lab 07/22/22  1309 07/22/22  0836 07/22/22  0500 07/22/22  0025   PH 7.49* 7.46* 7.48* 7.46*   PCO2 36 37 36 37   PO2 95 88 93 127*   HCO3 27 26 27 26     Complete Blood Count   Recent Labs   Lab 07/23/22  0344 07/22/22 2223 07/22/22  1623 07/22/22  0837   WBC 12.9* 15.9* 14.7* 17.8*   HGB 7.9* 7.7* 7.6* 7.9*    342 281 283     Basic Metabolic Panel  Recent Labs   Lab 07/23/22  0344 07/23/22  0342 07/22/22  2333 07/22/22  2223 07/22/22  1631 07/22/22  1623 07/22/22  1251 07/22/22  1020     --   --  137  --  136  --  136   POTASSIUM 4.0  --   --  4.0  --  4.2  --  4.3   CHLORIDE 101  --   --  100  --  101  --  101   CO2 27  --   --  28  --  26  --  24   BUN 25.4*  --   --  23.5*  --  23.9*  --  24.0*   CR 0.67  --   --  0.67  --  0.66*  --  0.71   * 143* 125* 150*   < > 138*   < > 150*    < > = values in this interval not displayed.     Liver Function Tests  Recent Labs   Lab 07/23/22  0603 07/23/22  0344 07/22/22  2223 07/22/22  1623 07/22/22  1020 07/22/22  0457 07/21/22  2149 07/21/22  1018 07/21/22  0331 07/19/22  0946 07/19/22  0322   AST 28  --   --   --   --   --  27  --  32  --  24   ALT 12  --   --   --   --   --  11  --  12  --  11   ALKPHOS 199*  --   --   --   --   --  155*  --  145*  --  146*   BILITOTAL 0.6  --   --   --   --   --  0.7  --  0.8  --  0.6   ALBUMIN 3.0*  --  2.9*  --   --   --  2.7*  --  2.7*   < > 2.5*   INR  --  1.28* 1.34* 1.35* 1.30*   < > 1.29*   < > 1.34*   < > 1.35*    < > = values in this interval not displayed.     Pancreatic Enzymes  No lab results found in last 7  days.  Coagulation Profile  Recent Labs   Lab 22  0344 22  2223 22  1623 22  1020   INR 1.28* 1.34* 1.35* 1.30*   * >240* >240* 118*         5. RADIOLOGY:   Recent Results (from the past 24 hour(s))   Echo Limited   Result Value    LVEF  <30% (severely reduced)    [facility]    [id number]  [id number]  [id number]  [id number]^JORGE^EUGENIA^PERRY     [facility],[place]  Echocardiography Laboratory  [address] [place] [id number]     Name: OLEG KAUR  MRN: [id number]  : 1960  Study Date: 2022 07:09 AM  Age: 61 yrs  Gender: Male  Patient Location: [facility]  Reason For Study: Heart Failure; S/P RVAD decannulation  Ordering Physician: EUGENIA PATEL  Performed By: Saba Prado     BSA: 1.9 m2  Height: 67 in  Weight: 182 lb  HR: 117  ______________________________________________________________________________  Procedure  Limited Portable Echo Adult.  ______________________________________________________________________________  Interpretation Summary  HM3 LVAD at 5200 RPM     s/p Protek Duo RVAD decanulation .     Mobile echogenicity in the RV cavity suspicious for thrombus.  Left ventricular function is decreased. The ejection fraction is <30%  (severely reduced).  Global right ventricular function is borderline reduced.  2.5 cm echogenic dense structure anterior to the RV.  No pericardial effusion is present.  Moderate tricuspid insufficiency is present.     This study was compared with the study from 2022 .Mobile echogenicty in  RV cavity is new in this study. Otherwise no significant change.     Team informed at 8:10 am.  ______________________________________________________________________________  Left Ventricle  Left ventricular function is decreased. The ejection fraction is <30%  (severely reduced). Severe diffuse hypokinesis is present.     Right Ventricle  The right ventricle is normal size. Global right ventricular function  is  borderline reduced.     Tricuspid Valve  Moderate tricuspid insufficiency is present.     Vessels  Dilation of the inferior vena cava is present with abnormal respiratory  variation in diameter.     Pericardium  No pericardial effusion is present.     Compared to Previous Study  This study was compared with the study from 2022 .  ______________________________________________________________________________  Doppler Measurements & Calculations  TR max chacorta: 152.5 cm/sec  TR max P.3 mmHg     ______________________________________________________________________________  Report approved by: Tashia FLORES 2022 08:11 AM         XR Chest Port 1 View    Impression    RESIDENT PRELIMINARY INTERPRETATION  IMPRESSION:   1. Supportive devices in similar position.  2. No significant change in bilateral mixed pulmonary opacities or  small pleural effusions.       =========================================

## 2022-07-23 NOTE — PROGRESS NOTES
[facility]    Cardiology Progress Note- Cards 2        Date of Admission:  6/17/2022     Assessment & Plan: HVSL   Dandy EDUARD Sands is a 61 year old male with PMH of lupus, antiphospholipid syndrome, HTN, HLD, HFrEF 2/2 ICM who presented with cardiogenic shock c/b multiorgan failure (JOSE, shock liver) requiring mechanical support with IABP, now s/p HM3 LVAD 7/8/22 by Dr. Zamora with intraoperative course c/b RV failure s/p 29F Protek duo via RIJ. Post op course c/b hemopericardium s/p repeat washout with chest left open. Chest now closed 7/13/22 and decannulated from RVAD 7/21    #HFrEF 2/2 ICM s/p HM3 LVAD in setting of cardiogenic shock (SCAI D)  #RV failure s/p Protek duo temporary RVAD s/p decannulation 7/21  #RV thrombus  #Post chest closure hemopericardium s/p repeat washout (7/12)  Patient with ICM s/p HM3 LVAD 7/8/22 by Dr. Zamora in setting of presentation for cardiogenic shock requiring MCS with IABP as prior to HM (initially evaluated for heart transplant, however noted to have substantially elevated DSAs during course of care, so decision made to proceed with LVAD given patient already on temporary MCS). Intraoperative course c/b RV failure resulting in cardiogenic shock requiring high dose pressors necessitating RVAD support. Chest closed 7/11 and Ashli weaned. However developed tachycardia, lactic acidosis and decreased hemoglobin with CT scan noting hemopericardium. Returned to OR where underwent washout with large clot burden noted over the right atrium and around LVAD outflow graft. Chest left open and returned to ICU where pressors were able to be weaned. Noted to have stable RVAD/LVAD flows throughout and post procedurally. Ultimately returned to OR for repeat closure. Currently hemodynamically stable with stable end-organ function and hemoglobin. Extubated and decannulated from RVAD on 7/21 with good tolerance from hemodynamic profile. Will  continue to monitor closely to optimize LVAD flows and end organ function  -LVAD: continues to have good flows, no alarms and stable end organ perfusion; will continue at current speed and monitor for alarms  -AC, antiplatelets: continue ASA; heparin high intensity for RV thrombus  -Volume status: appears euvolemic to slightly hypervolemic; lasix x1 this AM; goal net negative 500cc-1L  -rhythm: sinus   -of note, patient with climbing capture threshold of RV lead; will need to be evaluated in future by EP team  -hypertension: increase low dose captopril and continue hydralazine PRN    Dispo: Ok with transfer to floor from [facility] 2 team persepctive      The patient's care was discussed with the Attending Physician, Dr. Lora, Bedside Nurse and Primary team.    Emelyn Meneses MD  [facility]    ______________________________________________________________________    Interval History   Nursing notes reviewed. BRAXTON. Ongoing delirium this AM. Breathing appears slightly more comfortable    Data reviewed today: I reviewed all medications, new labs and imaging results over the last 24 hours    Physical Exam   Vital Signs: Temp: 98.6  F (37  C) Temp src: Oral  Pulse: 117   Resp: (!) 37 SpO2: 99 % O2 Device: High Flow Nasal Cannula (HFNC) Oxygen Delivery: 40 LPM  Weight: 173 lbs 15.09 oz  General Appearance: Older male in NAD breathing on HFNC  Respiratory: coarse lung sounds, nonlabored  Cardiovascular: vad hum, driveline site c/d/i, chest incisions c/d/i, JVD ~10 cm   GI: soft, bs active   Skin: warm, well perfused, 1+ pitting edema to the mid-tibia  Neuro: awake, alert, interactive, speech fluent    Data   Recent Labs   Lab 07/23/22  0603 07/23/22  0344 07/23/22  0342 07/22/22  2333 07/22/22  2223 07/22/22  1631 07/22/22  1623 07/22/22  0029 07/21/22  2149   WBC  --  12.9*  --   --  15.9*  --  14.7*   < > 15.1*   HGB  --  7.9*  --   --  7.7*  --  7.6*   < > 7.7*   MCV  --   92  --   --  93  --  91   < > 91   PLT  --  282  --   --  342  --  281   < > 238   INR  --  1.28*  --   --  1.34*  --  1.35*   < > 1.29*   NA  --  138  --   --  137  --  136   < > 138  138   POTASSIUM  --  4.0  --   --  4.0  --  4.2   < > 3.9  3.9   CHLORIDE  --  101  --   --  100  --  101   < > 102  102   CO2  --  27  --   --  28  --  26   < > 26  26   BUN  --  25.4*  --   --  23.5*  --  23.9*   < > 25.0*  25.0*   CR  --  0.67  --   --  0.67  --  0.66*   < > 0.72  0.72   ANIONGAP  --  10  --   --  9  --  9   < > 10  10   ELDA  --  8.2*  --   --  8.0*  --  8.3*   < > 8.1*  8.1*   GLC  --  141* 143* 125* 150*   < > 138*   < > 126*  126*   ALBUMIN 3.0*  --   --   --  2.9*  --   --   --  2.7*   PROTTOTAL 6.0*  --   --   --   --   --   --   --  5.5*   BILITOTAL 0.6  --   --   --   --   --   --   --  0.7   ALKPHOS 199*  --   --   --   --   --   --   --  155*   ALT 12  --   --   --   --   --   --   --  11   AST 28  --   --   --   --   --   --   --  27    < > = values in this interval not displayed.       Medications     dextrose       heparin 1,450 Units/hr (07/23/22 0700)     norepinephrine Stopped (07/21/22 2319)       acetaminophen  975 mg Oral or Feeding Tube Q8H CAMMY     aspirin  81 mg Oral or Feeding Tube Daily     atorvastatin  40 mg Oral QPM     captopril  6.25 mg Oral TID     DULoxetine  30 mg Oral Daily     fiber modular (NUTRISOURCE FIBER)  1 packet Per Feeding Tube TID     fluticasone-salmeterol  2 puff Inhalation BID     [Held by provider] hydrALAZINE  25 mg Oral or Feeding Tube Q8H CAMMY     hydroxychloroquine  200 mg Oral BID     insulin aspart  1-12 Units Subcutaneous Q4H     insulin glargine  20 Units Subcutaneous QAM AC     melatonin  10 mg Oral QPM     multivitamins w/minerals  15 mL Per Feeding Tube Daily     oxyCODONE  10 mg Oral or Feeding Tube Q4H     pantoprazole  40 mg Per Feeding Tube QAM AC     protein modular  1 packet Per Feeding Tube TID     QUEtiapine  100 mg Oral or Feeding Tube At  Bedtime     sodium chloride (PF)  3 mL Intracatheter Q8H     sodium chloride (PF)  3 mL Intracatheter Q8H     valproic acid  250 mg Oral BID     valproic acid  500 mg Oral QPM

## 2022-07-23 NOTE — PROGRESS NOTES
Major Shift Events:    Neuro: a bit confused this AM cleared more late this afternoon, FINCH, weak, Pupils 4-5s  CV: SR-ST, BP stable, afebrile, LVAD WNl, flows 3.5-4.1, index 2.7-7.8 & unger 3.2-3.9 RPM [id number]  Respi: LS dim/ throughout on HFNC 35% & 40L-- brief respi distress this afternoon requring Bipap 12/5 % for a few hours, recovered and is now back to HFNC settings  Gi/Gu:  TF at goal, standard FWF, rectal tube in place, good UOP, 1x lasix given, 1x bumex given then bumex gtt started     Plan: Work with therapies, wean O2 needs       For vital signs and complete assessments, please see documentation flowsheets.

## 2022-07-24 ENCOUNTER — APPOINTMENT (OUTPATIENT)
Dept: PHYSICAL THERAPY | Facility: CLINIC | Age: 62
DRG: 001 | End: 2022-07-24
Attending: INTERNAL MEDICINE
Payer: COMMERCIAL

## 2022-07-24 ENCOUNTER — APPOINTMENT (OUTPATIENT)
Dept: GENERAL RADIOLOGY | Facility: CLINIC | Age: 62
DRG: 001 | End: 2022-07-24
Attending: THORACIC SURGERY (CARDIOTHORACIC VASCULAR SURGERY)
Payer: COMMERCIAL

## 2022-07-24 LAB
ALBUMIN SERPL BCG-MCNC: 2.9 G/DL (ref 3.5–5.2)
ANION GAP SERPL CALCULATED.3IONS-SCNC: 10 MMOL/L (ref 7–15)
ANION GAP SERPL CALCULATED.3IONS-SCNC: 8 MMOL/L (ref 7–15)
ANION GAP SERPL CALCULATED.3IONS-SCNC: 9 MMOL/L (ref 7–15)
ANION GAP SERPL CALCULATED.3IONS-SCNC: 9 MMOL/L (ref 7–15)
APTT PPP: 100 SECONDS (ref 22–38)
APTT PPP: >240 SECONDS (ref 22–38)
AT III ACT/NOR PPP CHRO: 86 % (ref 85–135)
BACTERIA BLD CULT: NO GROWTH
BASE EXCESS BLDV CALC-SCNC: 10.4 MMOL/L (ref -7.7–1.9)
BASE EXCESS BLDV CALC-SCNC: 12.3 MMOL/L (ref -7.7–1.9)
BASOPHILS # BLD AUTO: 0 10E3/UL (ref 0–0.2)
BASOPHILS # BLD AUTO: 0 10E3/UL (ref 0–0.2)
BASOPHILS # BLD AUTO: 0.1 10E3/UL (ref 0–0.2)
BASOPHILS # BLD AUTO: 0.1 10E3/UL (ref 0–0.2)
BASOPHILS NFR BLD AUTO: 0 %
BLD PROD TYP BPU: NORMAL
BLOOD COMPONENT TYPE: NORMAL
BUN SERPL-MCNC: 18.1 MG/DL (ref 8–23)
BUN SERPL-MCNC: 19 MG/DL (ref 8–23)
BUN SERPL-MCNC: 19.6 MG/DL (ref 8–23)
BUN SERPL-MCNC: 20.8 MG/DL (ref 8–23)
CA-I BLD-MCNC: 4.1 MG/DL (ref 4.4–5.2)
CA-I BLD-MCNC: 4.3 MG/DL (ref 4.4–5.2)
CA-I BLD-MCNC: 4.4 MG/DL (ref 4.4–5.2)
CA-I BLD-MCNC: 4.5 MG/DL (ref 4.4–5.2)
CALCIUM SERPL-MCNC: 8.1 MG/DL (ref 8.8–10.2)
CALCIUM SERPL-MCNC: 8.1 MG/DL (ref 8.8–10.2)
CALCIUM SERPL-MCNC: 8.3 MG/DL (ref 8.8–10.2)
CALCIUM SERPL-MCNC: 8.4 MG/DL (ref 8.8–10.2)
CHLORIDE SERPL-SCNC: 92 MMOL/L (ref 98–107)
CHLORIDE SERPL-SCNC: 93 MMOL/L (ref 98–107)
CHLORIDE SERPL-SCNC: 97 MMOL/L (ref 98–107)
CHLORIDE SERPL-SCNC: 99 MMOL/L (ref 98–107)
CODING SYSTEM: NORMAL
CREAT SERPL-MCNC: 0.58 MG/DL (ref 0.67–1.17)
CREAT SERPL-MCNC: 0.58 MG/DL (ref 0.67–1.17)
CREAT SERPL-MCNC: 0.61 MG/DL (ref 0.67–1.17)
CREAT SERPL-MCNC: 0.62 MG/DL (ref 0.67–1.17)
CROSSMATCH: NORMAL
D DIMER PPP FEU-MCNC: 6.95 UG/ML FEU (ref 0–0.5)
D DIMER PPP FEU-MCNC: 7.08 UG/ML FEU (ref 0–0.5)
DEPRECATED HCO3 PLAS-SCNC: 29 MMOL/L (ref 22–29)
DEPRECATED HCO3 PLAS-SCNC: 29 MMOL/L (ref 22–29)
DEPRECATED HCO3 PLAS-SCNC: 34 MMOL/L (ref 22–29)
DEPRECATED HCO3 PLAS-SCNC: 34 MMOL/L (ref 22–29)
EOSINOPHIL # BLD AUTO: 0.2 10E3/UL (ref 0–0.7)
EOSINOPHIL # BLD AUTO: 0.3 10E3/UL (ref 0–0.7)
EOSINOPHIL # BLD AUTO: 0.3 10E3/UL (ref 0–0.7)
EOSINOPHIL # BLD AUTO: 0.4 10E3/UL (ref 0–0.7)
EOSINOPHIL NFR BLD AUTO: 2 %
EOSINOPHIL NFR BLD AUTO: 3 %
ERYTHROCYTE [DISTWIDTH] IN BLOOD BY AUTOMATED COUNT: 19.9 % (ref 10–15)
ERYTHROCYTE [DISTWIDTH] IN BLOOD BY AUTOMATED COUNT: 20 % (ref 10–15)
ERYTHROCYTE [DISTWIDTH] IN BLOOD BY AUTOMATED COUNT: 20.2 % (ref 10–15)
FIBRINOGEN PPP-MCNC: 448 MG/DL (ref 170–490)
FIBRINOGEN PPP-MCNC: 480 MG/DL (ref 170–490)
GFR SERPL CREATININE-BSD FRML MDRD: >90 ML/MIN/1.73M2
GLUCOSE BLDC GLUCOMTR-MCNC: 101 MG/DL (ref 70–99)
GLUCOSE BLDC GLUCOMTR-MCNC: 123 MG/DL (ref 70–99)
GLUCOSE BLDC GLUCOMTR-MCNC: 124 MG/DL (ref 70–99)
GLUCOSE BLDC GLUCOMTR-MCNC: 125 MG/DL (ref 70–99)
GLUCOSE BLDC GLUCOMTR-MCNC: 132 MG/DL (ref 70–99)
GLUCOSE BLDC GLUCOMTR-MCNC: 153 MG/DL (ref 70–99)
GLUCOSE BLDC GLUCOMTR-MCNC: 90 MG/DL (ref 70–99)
GLUCOSE SERPL-MCNC: 114 MG/DL (ref 70–99)
GLUCOSE SERPL-MCNC: 120 MG/DL (ref 70–99)
GLUCOSE SERPL-MCNC: 125 MG/DL (ref 70–99)
GLUCOSE SERPL-MCNC: 129 MG/DL (ref 70–99)
HCO3 BLDV-SCNC: 35 MMOL/L (ref 21–28)
HCO3 BLDV-SCNC: 37 MMOL/L (ref 21–28)
HCT VFR BLD AUTO: 23 % (ref 40–53)
HCT VFR BLD AUTO: 23.7 % (ref 40–53)
HCT VFR BLD AUTO: 24.3 % (ref 40–53)
HCT VFR BLD AUTO: 26 % (ref 40–53)
HCT VFR BLD AUTO: 26.2 % (ref 40–53)
HGB BLD-MCNC: 7.2 G/DL (ref 13.3–17.7)
HGB BLD-MCNC: 7.4 G/DL (ref 13.3–17.7)
HGB BLD-MCNC: 7.8 G/DL (ref 13.3–17.7)
HGB BLD-MCNC: 8.1 G/DL (ref 13.3–17.7)
HGB BLD-MCNC: 8.3 G/DL (ref 13.3–17.7)
HGB FREE PLAS-MCNC: <30 MG/DL
IMM GRANULOCYTES # BLD: 0.3 10E3/UL
IMM GRANULOCYTES NFR BLD: 2 %
IMM GRANULOCYTES NFR BLD: 3 %
INR PPP: 1.16 (ref 0.85–1.15)
INR PPP: 1.19 (ref 0.85–1.15)
INR PPP: 1.22 (ref 0.85–1.15)
INR PPP: 1.26 (ref 0.85–1.15)
ISSUE DATE AND TIME: NORMAL
LACTATE SERPL-SCNC: 1.3 MMOL/L (ref 0.7–2)
LACTATE SERPL-SCNC: 1.4 MMOL/L (ref 0.7–2)
LACTATE SERPL-SCNC: 1.6 MMOL/L (ref 0.7–2)
LACTATE SERPL-SCNC: 1.9 MMOL/L (ref 0.7–2)
LYMPHOCYTES # BLD AUTO: 1.2 10E3/UL (ref 0.8–5.3)
LYMPHOCYTES # BLD AUTO: 1.4 10E3/UL (ref 0.8–5.3)
LYMPHOCYTES # BLD AUTO: 1.4 10E3/UL (ref 0.8–5.3)
LYMPHOCYTES # BLD AUTO: 1.5 10E3/UL (ref 0.8–5.3)
LYMPHOCYTES NFR BLD AUTO: 10 %
LYMPHOCYTES NFR BLD AUTO: 11 %
LYMPHOCYTES NFR BLD AUTO: 11 %
LYMPHOCYTES NFR BLD AUTO: 12 %
MAGNESIUM SERPL-MCNC: 1.8 MG/DL (ref 1.7–2.3)
MAGNESIUM SERPL-MCNC: 2.1 MG/DL (ref 1.7–2.3)
MCH RBC QN AUTO: 28.5 PG (ref 26.5–33)
MCH RBC QN AUTO: 28.5 PG (ref 26.5–33)
MCH RBC QN AUTO: 28.6 PG (ref 26.5–33)
MCH RBC QN AUTO: 28.7 PG (ref 26.5–33)
MCH RBC QN AUTO: 29.1 PG (ref 26.5–33)
MCHC RBC AUTO-ENTMCNC: 31.2 G/DL (ref 31.5–36.5)
MCHC RBC AUTO-ENTMCNC: 31.2 G/DL (ref 31.5–36.5)
MCHC RBC AUTO-ENTMCNC: 31.3 G/DL (ref 31.5–36.5)
MCHC RBC AUTO-ENTMCNC: 31.7 G/DL (ref 31.5–36.5)
MCHC RBC AUTO-ENTMCNC: 32.1 G/DL (ref 31.5–36.5)
MCV RBC AUTO: 91 FL (ref 78–100)
MCV RBC AUTO: 92 FL (ref 78–100)
MCV RBC AUTO: 92 FL (ref 78–100)
MONOCYTES # BLD AUTO: 1 10E3/UL (ref 0–1.3)
MONOCYTES # BLD AUTO: 1 10E3/UL (ref 0–1.3)
MONOCYTES # BLD AUTO: 1.1 10E3/UL (ref 0–1.3)
MONOCYTES # BLD AUTO: 1.2 10E3/UL (ref 0–1.3)
MONOCYTES NFR BLD AUTO: 10 %
MONOCYTES NFR BLD AUTO: 8 %
MONOCYTES NFR BLD AUTO: 8 %
MONOCYTES NFR BLD AUTO: 9 %
NEUTROPHILS # BLD AUTO: 10.4 10E3/UL (ref 1.6–8.3)
NEUTROPHILS # BLD AUTO: 10.6 10E3/UL (ref 1.6–8.3)
NEUTROPHILS # BLD AUTO: 7.5 10E3/UL (ref 1.6–8.3)
NEUTROPHILS # BLD AUTO: 8.5 10E3/UL (ref 1.6–8.3)
NEUTROPHILS NFR BLD AUTO: 73 %
NEUTROPHILS NFR BLD AUTO: 74 %
NEUTROPHILS NFR BLD AUTO: 77 %
NEUTROPHILS NFR BLD AUTO: 77 %
NRBC # BLD AUTO: 0 10E3/UL
NRBC BLD AUTO-RTO: 0 /100
O2/TOTAL GAS SETTING VFR VENT: 30 %
O2/TOTAL GAS SETTING VFR VENT: 30 %
OXYHGB MFR BLDV: 47 % (ref 70–75)
OXYHGB MFR BLDV: 50 % (ref 70–75)
PCO2 BLDV: 49 MM HG (ref 40–50)
PCO2 BLDV: 51 MM HG (ref 40–50)
PH BLDV: 7.47 [PH] (ref 7.32–7.43)
PH BLDV: 7.48 [PH] (ref 7.32–7.43)
PHOSPHATE SERPL-MCNC: 2.8 MG/DL (ref 2.5–4.5)
PHOSPHATE SERPL-MCNC: 3.1 MG/DL (ref 2.5–4.5)
PLATELET # BLD AUTO: 352 10E3/UL (ref 150–450)
PLATELET # BLD AUTO: 372 10E3/UL (ref 150–450)
PLATELET # BLD AUTO: 381 10E3/UL (ref 150–450)
PLATELET # BLD AUTO: 412 10E3/UL (ref 150–450)
PLATELET # BLD AUTO: 440 10E3/UL (ref 150–450)
PO2 BLDV: 28 MM HG (ref 25–47)
PO2 BLDV: 29 MM HG (ref 25–47)
POTASSIUM SERPL-SCNC: 3.4 MMOL/L (ref 3.4–5.3)
POTASSIUM SERPL-SCNC: 3.6 MMOL/L (ref 3.4–5.3)
POTASSIUM SERPL-SCNC: 3.7 MMOL/L (ref 3.4–5.3)
POTASSIUM SERPL-SCNC: 3.9 MMOL/L (ref 3.4–5.3)
RBC # BLD AUTO: 2.53 10E6/UL (ref 4.4–5.9)
RBC # BLD AUTO: 2.59 10E6/UL (ref 4.4–5.9)
RBC # BLD AUTO: 2.68 10E6/UL (ref 4.4–5.9)
RBC # BLD AUTO: 2.84 10E6/UL (ref 4.4–5.9)
RBC # BLD AUTO: 2.89 10E6/UL (ref 4.4–5.9)
SODIUM SERPL-SCNC: 135 MMOL/L (ref 136–145)
SODIUM SERPL-SCNC: 138 MMOL/L (ref 136–145)
UFH PPP CHRO-ACNC: 0.22 IU/ML
UFH PPP CHRO-ACNC: 0.7 IU/ML
UNIT ABO/RH: NORMAL
UNIT NUMBER: NORMAL
UNIT STATUS: NORMAL
UNIT TYPE ISBT: 5100
WBC # BLD AUTO: 10.2 10E3/UL (ref 4–11)
WBC # BLD AUTO: 11.7 10E3/UL (ref 4–11)
WBC # BLD AUTO: 13.6 10E3/UL (ref 4–11)
WBC # BLD AUTO: 13.8 10E3/UL (ref 4–11)
WBC # BLD AUTO: 15.3 10E3/UL (ref 4–11)

## 2022-07-24 PROCEDURE — 250N000013 HC RX MED GY IP 250 OP 250 PS 637: Performed by: STUDENT IN AN ORGANIZED HEALTH CARE EDUCATION/TRAINING PROGRAM

## 2022-07-24 PROCEDURE — 82330 ASSAY OF CALCIUM: CPT | Performed by: SURGERY

## 2022-07-24 PROCEDURE — 250N000011 HC RX IP 250 OP 636: Performed by: ANESTHESIOLOGY

## 2022-07-24 PROCEDURE — 82805 BLOOD GASES W/O2 SATURATION: CPT | Performed by: STUDENT IN AN ORGANIZED HEALTH CARE EDUCATION/TRAINING PROGRAM

## 2022-07-24 PROCEDURE — 86923 COMPATIBILITY TEST ELECTRIC: CPT | Performed by: STUDENT IN AN ORGANIZED HEALTH CARE EDUCATION/TRAINING PROGRAM

## 2022-07-24 PROCEDURE — 250N000013 HC RX MED GY IP 250 OP 250 PS 637: Performed by: SURGERY

## 2022-07-24 PROCEDURE — 999N000215 HC STATISTIC HFNC ADULT NON-CPAP

## 2022-07-24 PROCEDURE — 83735 ASSAY OF MAGNESIUM: CPT | Performed by: SURGERY

## 2022-07-24 PROCEDURE — 99291 CRITICAL CARE FIRST HOUR: CPT | Mod: 25 | Performed by: INTERNAL MEDICINE

## 2022-07-24 PROCEDURE — 86901 BLOOD TYPING SEROLOGIC RH(D): CPT | Performed by: THORACIC SURGERY (CARDIOTHORACIC VASCULAR SURGERY)

## 2022-07-24 PROCEDURE — 97110 THERAPEUTIC EXERCISES: CPT | Mod: GP | Performed by: PHYSICAL THERAPIST

## 2022-07-24 PROCEDURE — 97530 THERAPEUTIC ACTIVITIES: CPT | Mod: GP | Performed by: PHYSICAL THERAPIST

## 2022-07-24 PROCEDURE — 250N000011 HC RX IP 250 OP 636: Performed by: STUDENT IN AN ORGANIZED HEALTH CARE EDUCATION/TRAINING PROGRAM

## 2022-07-24 PROCEDURE — 258N000003 HC RX IP 258 OP 636: Performed by: STUDENT IN AN ORGANIZED HEALTH CARE EDUCATION/TRAINING PROGRAM

## 2022-07-24 PROCEDURE — 85520 HEPARIN ASSAY: CPT | Performed by: THORACIC SURGERY (CARDIOTHORACIC VASCULAR SURGERY)

## 2022-07-24 PROCEDURE — 85027 COMPLETE CBC AUTOMATED: CPT | Performed by: SURGERY

## 2022-07-24 PROCEDURE — 87040 BLOOD CULTURE FOR BACTERIA: CPT | Performed by: SURGERY

## 2022-07-24 PROCEDURE — 85730 THROMBOPLASTIN TIME PARTIAL: CPT | Performed by: SURGERY

## 2022-07-24 PROCEDURE — 85379 FIBRIN DEGRADATION QUANT: CPT | Performed by: SURGERY

## 2022-07-24 PROCEDURE — 200N000002 HC R&B ICU UMMC

## 2022-07-24 PROCEDURE — 250N000013 HC RX MED GY IP 250 OP 250 PS 637: Performed by: INTERNAL MEDICINE

## 2022-07-24 PROCEDURE — 85014 HEMATOCRIT: CPT | Performed by: STUDENT IN AN ORGANIZED HEALTH CARE EDUCATION/TRAINING PROGRAM

## 2022-07-24 PROCEDURE — 80069 RENAL FUNCTION PANEL: CPT | Performed by: SURGERY

## 2022-07-24 PROCEDURE — 85384 FIBRINOGEN ACTIVITY: CPT | Performed by: SURGERY

## 2022-07-24 PROCEDURE — 250N000013 HC RX MED GY IP 250 OP 250 PS 637: Performed by: THORACIC SURGERY (CARDIOTHORACIC VASCULAR SURGERY)

## 2022-07-24 PROCEDURE — 86850 RBC ANTIBODY SCREEN: CPT | Performed by: THORACIC SURGERY (CARDIOTHORACIC VASCULAR SURGERY)

## 2022-07-24 PROCEDURE — 93750 INTERROGATION VAD IN PERSON: CPT | Performed by: INTERNAL MEDICINE

## 2022-07-24 PROCEDURE — 85610 PROTHROMBIN TIME: CPT | Performed by: SURGERY

## 2022-07-24 PROCEDURE — 85300 ANTITHROMBIN III ACTIVITY: CPT | Performed by: SURGERY

## 2022-07-24 PROCEDURE — 82040 ASSAY OF SERUM ALBUMIN: CPT | Performed by: SURGERY

## 2022-07-24 PROCEDURE — 36415 COLL VENOUS BLD VENIPUNCTURE: CPT | Performed by: SURGERY

## 2022-07-24 PROCEDURE — 71045 X-RAY EXAM CHEST 1 VIEW: CPT | Mod: 26 | Performed by: RADIOLOGY

## 2022-07-24 PROCEDURE — 999N000157 HC STATISTIC RCP TIME EA 10 MIN

## 2022-07-24 PROCEDURE — 71045 X-RAY EXAM CHEST 1 VIEW: CPT

## 2022-07-24 PROCEDURE — 83605 ASSAY OF LACTIC ACID: CPT | Performed by: SURGERY

## 2022-07-24 PROCEDURE — 82310 ASSAY OF CALCIUM: CPT | Performed by: SURGERY

## 2022-07-24 PROCEDURE — 250N000009 HC RX 250: Performed by: STUDENT IN AN ORGANIZED HEALTH CARE EDUCATION/TRAINING PROGRAM

## 2022-07-24 PROCEDURE — 83051 HEMOGLOBIN PLASMA: CPT | Performed by: SURGERY

## 2022-07-24 PROCEDURE — 250N000013 HC RX MED GY IP 250 OP 250 PS 637: Performed by: ANESTHESIOLOGY

## 2022-07-24 RX ORDER — HEPARIN SODIUM 10000 [USP'U]/100ML
0-5000 INJECTION, SOLUTION INTRAVENOUS CONTINUOUS
Status: DISCONTINUED | OUTPATIENT
Start: 2022-07-24 | End: 2022-07-30

## 2022-07-24 RX ORDER — WARFARIN SODIUM 5 MG/1
5 TABLET ORAL
Status: COMPLETED | OUTPATIENT
Start: 2022-07-24 | End: 2022-07-24

## 2022-07-24 RX ORDER — BUMETANIDE 0.25 MG/ML
2 INJECTION INTRAMUSCULAR; INTRAVENOUS ONCE
Status: COMPLETED | OUTPATIENT
Start: 2022-07-24 | End: 2022-07-24

## 2022-07-24 RX ORDER — POTASSIUM CHLORIDE 20MEQ/15ML
40 LIQUID (ML) ORAL ONCE
Status: COMPLETED | OUTPATIENT
Start: 2022-07-24 | End: 2022-07-24

## 2022-07-24 RX ORDER — LIDOCAINE 4 G/G
1 PATCH TOPICAL
Status: DISCONTINUED | OUTPATIENT
Start: 2022-07-24 | End: 2022-08-21 | Stop reason: HOSPADM

## 2022-07-24 RX ADMIN — OXYCODONE HYDROCHLORIDE 5 MG: 5 TABLET ORAL at 14:05

## 2022-07-24 RX ADMIN — Medication 12.5 MG: at 07:26

## 2022-07-24 RX ADMIN — HEPARIN SODIUM AND DEXTROSE 1900 UNITS/HR: 10000; 5 INJECTION INTRAVENOUS at 07:23

## 2022-07-24 RX ADMIN — VALPROIC ACID 250 MG: 250 SOLUTION ORAL at 14:14

## 2022-07-24 RX ADMIN — BUMETANIDE 2 MG/HR: 0.25 INJECTION, SOLUTION INTRAMUSCULAR; INTRAVENOUS at 10:34

## 2022-07-24 RX ADMIN — FLUTICASONE PROPIONATE AND SALMETEROL XINAFOATE 2 PUFF: 115; 21 AEROSOL, METERED RESPIRATORY (INHALATION) at 19:53

## 2022-07-24 RX ADMIN — HEPARIN SODIUM AND DEXTROSE 1900 UNITS/HR: 10000; 5 INJECTION INTRAVENOUS at 14:58

## 2022-07-24 RX ADMIN — BUMETANIDE 2 MG: 0.25 INJECTION, SOLUTION INTRAMUSCULAR; INTRAVENOUS at 08:31

## 2022-07-24 RX ADMIN — ACETAMINOPHEN 975 MG: 325 TABLET, FILM COATED ORAL at 21:05

## 2022-07-24 RX ADMIN — HYDROXYZINE HYDROCHLORIDE 50 MG: 25 TABLET ORAL at 06:21

## 2022-07-24 RX ADMIN — HYDROXYCHLOROQUINE SULFATE 200 MG: 200 TABLET, FILM COATED ORAL at 19:52

## 2022-07-24 RX ADMIN — SODIUM CHLORIDE, POTASSIUM CHLORIDE, SODIUM LACTATE AND CALCIUM CHLORIDE 500 ML: 600; 310; 30; 20 INJECTION, SOLUTION INTRAVENOUS at 21:20

## 2022-07-24 RX ADMIN — Medication 1 PACKET: at 14:06

## 2022-07-24 RX ADMIN — OXYCODONE HYDROCHLORIDE 10 MG: 5 TABLET ORAL at 23:19

## 2022-07-24 RX ADMIN — OXYCODONE HYDROCHLORIDE 10 MG: 5 TABLET ORAL at 16:40

## 2022-07-24 RX ADMIN — Medication 10 MG: at 19:51

## 2022-07-24 RX ADMIN — HEPARIN SODIUM AND DEXTROSE 1900 UNITS/HR: 10000; 5 INJECTION INTRAVENOUS at 06:21

## 2022-07-24 RX ADMIN — Medication 1 PACKET: at 19:48

## 2022-07-24 RX ADMIN — Medication 1 PACKET: at 07:26

## 2022-07-24 RX ADMIN — MULTIVITAMIN 15 ML: LIQUID ORAL at 07:25

## 2022-07-24 RX ADMIN — DULOXETINE 30 MG: 30 CAPSULE, DELAYED RELEASE ORAL at 07:26

## 2022-07-24 RX ADMIN — VALPROIC ACID 500 MG: 250 SOLUTION ORAL at 19:49

## 2022-07-24 RX ADMIN — POTASSIUM CHLORIDE 40 MEQ: 40 SOLUTION ORAL at 18:15

## 2022-07-24 RX ADMIN — INSULIN ASPART 1 UNITS: 100 INJECTION, SOLUTION INTRAVENOUS; SUBCUTANEOUS at 21:23

## 2022-07-24 RX ADMIN — ASPIRIN 81 MG CHEWABLE TABLET 81 MG: 81 TABLET CHEWABLE at 07:26

## 2022-07-24 RX ADMIN — QUETIAPINE FUMARATE 100 MG: 100 TABLET ORAL at 21:05

## 2022-07-24 RX ADMIN — OXYCODONE HYDROCHLORIDE 10 MG: 5 TABLET ORAL at 03:04

## 2022-07-24 RX ADMIN — Medication 12.5 MG: at 19:51

## 2022-07-24 RX ADMIN — INSULIN GLARGINE 20 UNITS: 100 INJECTION, SOLUTION SUBCUTANEOUS at 07:31

## 2022-07-24 RX ADMIN — OXYCODONE HYDROCHLORIDE 10 MG: 5 TABLET ORAL at 09:52

## 2022-07-24 RX ADMIN — BUMETANIDE 2 MG/HR: 0.25 INJECTION, SOLUTION INTRAMUSCULAR; INTRAVENOUS at 18:44

## 2022-07-24 RX ADMIN — Medication 12.5 MG: at 14:05

## 2022-07-24 RX ADMIN — ACETAMINOPHEN 975 MG: 325 TABLET, FILM COATED ORAL at 14:05

## 2022-07-24 RX ADMIN — WARFARIN SODIUM 5 MG: 5 TABLET ORAL at 17:38

## 2022-07-24 RX ADMIN — ATORVASTATIN CALCIUM 40 MG: 40 TABLET, FILM COATED ORAL at 19:52

## 2022-07-24 RX ADMIN — HYDROXYZINE HYDROCHLORIDE 50 MG: 25 TABLET ORAL at 12:45

## 2022-07-24 RX ADMIN — ACETAMINOPHEN 975 MG: 325 TABLET, FILM COATED ORAL at 05:48

## 2022-07-24 RX ADMIN — VALPROIC ACID 250 MG: 250 SOLUTION ORAL at 07:26

## 2022-07-24 RX ADMIN — Medication 40 MG: at 07:27

## 2022-07-24 RX ADMIN — FLUTICASONE PROPIONATE AND SALMETEROL XINAFOATE 2 PUFF: 115; 21 AEROSOL, METERED RESPIRATORY (INHALATION) at 07:27

## 2022-07-24 RX ADMIN — HYDROXYCHLOROQUINE SULFATE 200 MG: 200 TABLET, FILM COATED ORAL at 07:26

## 2022-07-24 ASSESSMENT — ACTIVITIES OF DAILY LIVING (ADL)
ADLS_ACUITY_SCORE: 36

## 2022-07-24 NOTE — PHARMACY-ANTICOAGULATION SERVICE
Clinical Pharmacy - Warfarin Dosing Consult     Pharmacy has been consulted to manage this patient s warfarin therapy.  Indication: LVAD/RVAD  Therapy Goal: INR 2-3  Provider/Team: CVTS  Warfarin Prior to Admission: Yes  Warfarin PTA Regimen: 7.5 mg Mon/Thurs/Sat. 5mg Tu/Wed/Fri/Sun  Significant drug interactions: Valproic acid, Heparin gtt, and aspirin  Recent documented change in oral intake/nutrition: Unknown    INR   Date Value Ref Range Status   07/24/2022 1.16 (H) 0.85 - 1.15 Final   07/24/2022 1.22 (H) 0.85 - 1.15 Final       Recommend warfarin 5 mg today.  Pharmacy will monitor Dandy Sands daily and order warfarin doses to achieve specified goal.      Please contact pharmacy as soon as possible if the warfarin needs to be held for a procedure or if the warfarin goals change.

## 2022-07-24 NOTE — PROGRESS NOTES
CV ICU PROGRESS NOTE  07/24/2022        CO-MORBIDITIES  1. Cardiogenic shock (H)  (primary encounter diagnosis)  2. Ischemic cardiomyopathy  3. Heart failure with reduced ejection fraction, NYHA class III (H)  4. Coronary artery disease involving native coronary artery of native heart without angina pectoris      ASSESSMENT Dandy Sands is a 61 year old male with a PMH of CAD s/p PCI to LAD, MI, ICM, acute on chronic heart failure, s/p CRT-D, severe MR, antiphospholipid antibody syndrome on chronic warfarin, SLE, HTN, HLD, recent hospitalization 5/16-5/26 s/p RCA, LAD stenting who underwent RVAD (29F Protek duo RIJ) LVAD placement (HeartMate 3) on 7/8/22 by Dr. Zamora. Intraoperative course complicated by IABP dysfunction and RV failure, requiring crash onto bypass / vasopressor support, NO, and protek placement. Now s/p chest closure and NO wean 7/11. Return to OR for hemopericardium (7/12) and chest re-closure 7/13. Protek RVAD removed 7/21.     Today's Progress:  - Continue diuresis: Bumex 2mg, followed by gtt per Cardiology  - Chest tubes removed  - Resume Heparin 1500  - Warfarin to start tonight     Overnight Events:  - NAEON  - Hypotension after diuresis yesterday, given 500cc LR for MAPs 40-50s  - Net negative 2L    PLAN:   Neuro/ pain/ sedation:  #Acute Postoperative pain  #Depression   #Anxiety due to a medical condition  #Insomnia  #Delerium   Delirious intermittently.   - Monitor neurological status. Notify the MD for any acute changes in exam.  - Pain:    -Scheduled tylenol, scheduling oxycodone 10mg q4h.    -PRN dilaudid  - Depression: Duloxetine 30mg  - Sleep: Melatonin 10 mg HS, Seroquel 100 mg HS,   - Anxiety: Valproate po 250 mg BID and 500mg Bedtime   - Hydroxyzine PRN  - PTA meds: hydroxyzine 25mg PRN, zolpidem 5mg, melatonin 3mg, lorazepam 0.5mg  - QTc(7/18)- 683 msec, hence no haldol     Pulmonary:   #Acute hypoxic respiratory failure on iMV  #Mild-moderate emphysema  #History of  COVID-19  Off nitric 7/12, extubated 7/20 to HFNC.  - Wean supplemental O2 as able  - Maintain SpO2> 92%  - Incentive spirometry     Cardiovascular:    #S/p LVAD placement (HeartMate 3) on 7/8/22   #S/p RVAD (29F Protek duo RIJ) on 7/8/22  #S/p chest closure 7/11  #Cardiogenic shock  #Advanced ischemic cardiomyopathy, class IV, stage D  #CAD s/p PCI to LAD (2005)  #S/p CRT-D (2006)  #History of MI (2007)  #Severe MR  #Antiphospholipid antibody syndrome on chronic warfarin  #HTN, HLD  #Recent hospitalization 5/16-5/26 s/p RCA, LAD stenting  #Atrial fibrillation   # RV mobile clot   - Monitor hemodynamic status. Chest closure and oxygenator splicing 7/11. Reopened 7/12 for I&D and left open, closed 7/13. RVAD removed 7/21.  - Goal MAP > 65   - PTA meds(held): clopidogrel 75mg, lasix 40mg, warfarin 5mg  - LVAD management per Advanced HF service  - AC Plan: High intensity Heparin for RV mobile thrombus   - Aspirin 81 mg  - Atorvastatin 40mg  - Hydralazine uriel and prn MAP >85 discontinued  - Captopril 12.5mg TID dose titration per cardiology      GI / Nutrition:   #GERD  #Congestive hepatopathy in the setting of cardiac insuffiencey -resolved  # Hematemesis / oral mucosal bleeding -resolved    - Full liquid diet, SLP following  - Continue NJT at goal  - Bowel regimen prn (miralax / senna)  - Nutrition consulted  - Trend LFT's every alternate day    Renal/ Fluid Balance:    - Strict I/O, daily weights   - Avoid/limit nephrotoxins as able  - Replete lytes PRN per protocol  - PTA meds: tamsulosin 0.4mg (held)  - Goal net negative ~ 1L    Endocrine:    #Stress induced hyperglycemia  Preop A1c 5.5%  - Insulin glargine 20U and HD sliding scale  - Goal BG <180 for optimal healing     ID/ Antibiotics:  #Periopearive antibiosis (s/p course of Cefepime, vancomycin, rifampin, fluconazole)  #HAP - Enterococcus faecalis PNA, s/p treatment   - Discontinue Cefepime and Vancomycin (7/13-7/14), chest now closed   - Trend fever curve   -  Pan culture (7/12): Bcx x2, UA/UC -> negative     -7/14 Endotracheal sputum culture (4+ candida albicans, 1+ E.Coli, 3+ enterococcus faecalis)  - 7/18 C.diff- negative  - Zosyn for HAP(7/15-7/22)    Heme:     #Acute blood loss anemia  #Acute blood loss thrombocytopenia  #History of DVT and PE   #Antiphospholipid antibody syndrome  #History of iron deficiency anemia  - Monitor for s/sx of bleeding  - Trend CBC and coags.  - Hgb goal >8  - Plt goal > 20    Rheumatology:  #SLE  - Chronic, symptoms include arthritis, photosensitivity, fatigue, and skin manifestations  - PTA meds: hydroxychloroquine 200mg, resumed 7/16  - Rheumatology consulted and following. Follow dsDNA and complement levels(7/18).    -Will need cellcept restarted when appropriate.    Prophylaxis:    - DVT prophylaxis: SCD + high intensity heparin + starting warfarin today   - Aspirin 81 mg  - PPI  - PT/OT    Lines / Tubes / Drains:  - R triple lumen PICC(6/17-)  - Left Axillary A-line(7/18-)  - Fitzgerald(7/8-)  - NJT(7/11-)  - Chest Tubes(7/8-)  - Rectal tube      Disposition:  - CVICU.  - ICU daily rounding checklist conducted.  - Family update to be completed by ICU team.     Patient seen, findings and plan discussed with CV ICU staff, Dr. Davidson.    Rashard Stephenson MD  Anesthesia, PGY3    ----------------------------------------------------------------      Subjective:  Mentioned as above.    OBJECTIVE:   1. VITAL SIGNS:   Temp:  [97.8  F (36.6  C)-98.6  F (37  C)] 98.6  F (37  C)  Pulse:  [101-120] 115  Resp:  [15-40] 26  MAP:  [53 mmHg-102 mmHg] 90 mmHg  Arterial Line BP: ()/(48-90) 98/81  FiO2 (%):  [30 %] 30 %  SpO2:  [86 %-100 %] 99 %  FiO2 (%): 30 %  Resp: 26      2. INTAKE/ OUTPUT:   I/O last 3 completed shifts:  In: 3193.78 [P.O.:410; I.V.:1103.78; NG/GT:480]  Out: 4391 [Urine:3830; Stool:400; Chest Tube:161]    3. PHYSICAL EXAMINATION:   General: sitting in chair, comfortable  Neuro: follow commands, ongoing intermittent  agitation/delerium  Resp: on HFNC  CV: tachycardic  Abdomen: Soft, mildly distended, Non-tender  Incisions: c/d/i  Extremities: warm and well perfused    4. INVESTIGATIONS:   Arterial Blood Gases   Recent Labs   Lab 07/23/22  1419 07/23/22  1224 07/23/22  1042 07/22/22  1309   PH 7.47* 7.40 7.49* 7.49*   PCO2 39 43 38 36   PO2 178* 137* 113* 95   HCO3 29* 27 29* 27     Complete Blood Count   Recent Labs   Lab 07/24/22  1240 07/24/22  0953 07/24/22  0405 07/23/22 2124   WBC 15.3* 13.6* 11.7* 14.1*   HGB 8.3* 7.8* 7.4* 7.9*    381 352 368     Basic Metabolic Panel  Recent Labs   Lab 07/24/22  1244 07/24/22  0953 07/24/22  0730 07/24/22  0411 07/24/22  0405 07/24/22  0010 07/23/22 2124 07/23/22  1623 07/23/22  1617   NA  --  135*  --   --  138  --  136  --  136   POTASSIUM  --  3.9  --   --  3.7  --  3.6  --  3.9   CHLORIDE  --  97*  --   --  99  --  97*  --  97*   CO2  --  29  --   --  29  --  30*  --  30*   BUN  --  19.0  --   --  20.8  --  21.8  --  22.7   CR  --  0.58*  --   --  0.58*  --  0.57*  --  0.57*   * 120* 101* 132* 129*   < > 117*   < > 146*    < > = values in this interval not displayed.     Liver Function Tests  Recent Labs   Lab 07/24/22  0953 07/24/22  0405 07/23/22 2124 07/23/22  1617 07/23/22  1037 07/23/22  0603 07/23/22  0344 07/22/22  2223 07/22/22  0457 07/21/22  2149 07/21/22  1018 07/21/22  0331 07/19/22  0946 07/19/22  0322   AST  --   --   --   --   --  28  --   --   --  27  --  32  --  24   ALT  --   --   --   --   --  12  --   --   --  11  --  12  --  11   ALKPHOS  --   --   --   --   --  199*  --   --   --  155*  --  145*  --  146*   BILITOTAL  --   --   --   --   --  0.6  --   --   --  0.7  --  0.8  --  0.6   ALBUMIN  --  2.9*  --   --   --  3.0*  --  2.9*  --  2.7*  --  2.7*   < > 2.5*   INR 1.16* 1.22* 1.23* 1.19*   < >  --    < > 1.34*   < > 1.29*   < > 1.34*   < > 1.35*    < > = values in this interval not displayed.     Pancreatic Enzymes  No lab results found in  last 7 days.  Coagulation Profile  Recent Labs   Lab 22  0953 22  0405 22  2124 22  1617   INR 1.16* 1.22* 1.23* 1.19*   * >240* 105* 74*         5. RADIOLOGY:   Recent Results (from the past 24 hour(s))   US Upper Extremity Venous Duplex Left Portable    Narrative    EXAMINATION: DOPPLER VENOUS ULTRASOUND OF THE LEFT UPPER EXTREMITY,  2022 2:27 PM     COMPARISON: 2022.    HISTORY: Swelling of left upper extremity    TECHNIQUE:  Gray-scale evaluation with compression, spectral flow and  color Doppler assessment of the deep venous system of the left upper  extremity.    FINDINGS:    Left: Normal blood flow and waveforms are demonstrated in the internal  jugular, innominate, subclavian, and axillary veins. There is normal  compressibility of the brachial, basilic and cephalic veins. Left  upper arm obscured by bandage: Cephalic vein is thick walled appearing  in the antecubital fossa, though compressible.        Impression    IMPRESSION: Left upper arm obscured by bandage, otherwise, no left  upper extremity deep venous thrombus is demonstrated.    I have personally reviewed the examination and initial interpretation  and I agree with the findings.    JILL CARRANZA MD         SYSTEM ID:  [id number]   Echo Limited    Narrative    [id number]  [id number]  [id number]  [id number]^MONE^TARAS^EDUARD     [facility],[place]  Echocardiography Laboratory  [address] [place] [id number]     Name: OLEG KAUR  MRN: [id number]  : 1960  Study Date: 2022 02:14 PM  Age: 61 yrs  Gender: Male  Patient Location: [facility]  Reason For Study: Other, Please Specify in Comments  Ordering Physician: TARAS SHORE  Performed By: Antonietta Yi RDCS     BSA: 1.9 m2  Height: 67 in  Weight: 173 lb  BP: 79/62 mmHg  ______________________________________________________________________________  Procedure  Limited Echocardiogram with portions of two-dimensional, color  and spectral  Doppler performed.  ______________________________________________________________________________  Interpretation Summary  HM3 LVAD at 5200 RPM. s/p Protek Duo RVAD decanulation 7/21.  Large mobile RV mass, presumably thrombus, and appears attached to free wall.  No significant changes noted.  ______________________________________________________________________________  Left Ventricle  Outflow graft is not visualized.     Right Ventricle  Global right ventricular function is mildly reduced. A pacemaker lead is noted  in the right ventricle.     Mitral Valve  The mitral valve is normal.     Aortic Valve  Mild aortic insufficiency is present. AoV doesnt open.     Tricuspid Valve  Moderate tricuspid insufficiency is present.     Vessels  IVC diameter >2.1 cm collapsing <50% with sniff suggests a high RA pressure  estimated at 15 mmHg or greater.     Pericardium  Extensive pericardial hematoma noted.     Compared to Previous Study  No significant changes noted.     ______________________________________________________________________________  Report approved by: Nakia Shi 07/23/2022 03:09 PM     ______________________________________________________________________________      XR Chest Port 1 View    Narrative    EXAM: TEMPORARY  7/24/2022 3:07 AM     HISTORY:  ECMO, RVAD, LVAD       COMPARISON:  Radiographs 7/23/2022.    TECHNIQUE: AP view of the chest the 30 degrees.    FINDINGS:   Devices, lines, tubes: Right upper extremity PICC tip projecting over  the mid SVC. Left chest wall implantable cardiac defibrillator and  leads in stable positioning. Feeding tube coursing inferior to the  left hemidiaphragm with tip outside the field-of-view. Median  sternotomy wires are intact. Coronary artery stenting. Left basilar  chest tube and mediastinal/pericardial drain and stable positioning.    The trachea is midline. The cardiomediastinal silhouette is stably  enlarged. Increased size of right  pleural effusion. The left  costophrenic angle is obscured by support devices.  Interval increase  in bibasilar hazy opacities. No appreciable pneumothorax. No acute  osseous abnormality.        Impression    IMPRESSION:   1. Interval increased size of right pleural effusion with overlying  opacities.  2. No significant change in bilateral mixed pulmonary opacities,  favoring pulmonary edema.  3. Support devices in similar positioning.    I have personally reviewed the examination and initial interpretation  and I agree with the findings.    RAHEEM MEAD MD         SYSTEM ID:  [id number]       =========================================

## 2022-07-24 NOTE — PLAN OF CARE
"Major Shift Events:      Neuro/Pain: Disoriented to situation and time. Weak in all extremities. Strength is equal. Pupils equal and reactive. RASS -1 to +1. Pain in right shoulder. Scheduled oxy given with effect.   Cardiac: Sinus tachycardia. No ectopy. MSP gosl > 65. Pt dropped MAPS to 48. Resumed to baseline after 500mL LR bolus. See chart for LVAD numbers.   Respiratory: High flow nasal cannula in place. 30% 40LPM. Chest tube output minimal.   GI: rectal tube in place. 350mL out over night.   : marcelo in place. Urine output 30-150mL per hour. Output trending at 30mLhour. CVTS aware.   Endocrine/Diet BG checked per order.     Pt made sexual comments towards norse. Stating \"sit on my lap\". Also stating \"I like the way you pull on my hose\" when giving Meds through pts NJ tube.     Plan: Transfer to floor if appropriate. Work with therapies. Increase oral intake.   For vital signs and complete assessments, please see documentation flowsheets.     "

## 2022-07-24 NOTE — PROGRESS NOTES
[facility]    Cardiology Progress Note- Cards 2        Date of Admission:  6/17/2022     Assessment & Plan: HVSL   Dandy EDUARD Sands is a 61 year old male with PMH of lupus, antiphospholipid syndrome, HTN, HLD, HFrEF 2/2 ICM who presented with cardiogenic shock c/b multiorgan failure (JOSE, shock liver) requiring mechanical support with IABP, now s/p HM3 LVAD 7/8/22 by Dr. Zamora with intraoperative course c/b RV failure s/p 29F Protek duo via RIJ. Post op course c/b hemopericardium s/p repeat washout with chest left open. Chest now closed 7/13/22 and decannulated from RVAD 7/21    #HFrEF 2/2 ICM s/p HM3 LVAD in setting of cardiogenic shock (SCAI D)  #RV failure s/p Protek duo temporary RVAD s/p decannulation 7/21  #RV thrombus  #Post chest closure hemopericardium s/p repeat washout (7/12)  Patient with ICM s/p HM3 LVAD 7/8/22 by Dr. Zamora in setting of presentation for cardiogenic shock requiring MCS with IABP as prior to HM (initially evaluated for heart transplant, however noted to have substantially elevated DSAs during course of care, so decision made to proceed with LVAD given patient already on temporary MCS). Intraoperative course c/b RV failure resulting in cardiogenic shock requiring high dose pressors necessitating RVAD support. Chest closed 7/11 and Ashli weaned. However developed tachycardia, lactic acidosis and decreased hemoglobin with CT scan noting hemopericardium. Returned to OR where underwent washout with large clot burden noted over the right atrium and around LVAD outflow graft. Chest left open and returned to ICU where pressors were able to be weaned. Noted to have stable RVAD/LVAD flows throughout and post procedurally. Ultimately returned to OR for repeat closure. Currently hemodynamically stable with stable end-organ function and hemoglobin. Extubated and decannulated from RVAD on 7/21 with good tolerance from hemodynamic profile. Will  continue to monitor closely to optimize LVAD flows and end organ function. Did have abrupt onset of tachypnea and hypoxia on 7/23 with unknown etiology that appeared to resolve with diuresis. Will attempt more aggressive diuresis today given ongoing appearance of hypervolemia.   -LVAD: continues to have good flows, no alarms and stable end organ perfusion; will increase speed to 5300  -AC, antiplatelets: continue ASA; heparin high intensity for RV thrombus  -Volume status: appears hypervolemic; repeat bumex IV 2mg; will start bumex gtt at 2mg/hr; goal net negative 2L  -rhythm: sinus   -of note, patient with climbing capture threshold of RV lead; will need to be evaluated in future by EP team  -hypertension: continue captopril 12.5mg TID      The patient's care was discussed with the Attending Physician, Dr. Lora, Bedside Nurse and Primary team.    Emelyn Meneses MD  [facility]    ______________________________________________________________________    Interval History   Nursing notes reviewed. BRAXTON. Reports feeling well this AM. States he felt very anxious yesterday.     Data reviewed today: I reviewed all medications, new labs and imaging results over the last 24 hours    Physical Exam   Vital Signs: Temp: 97.9  F (36.6  C) Temp src: Oral  Pulse: 111   Resp: (!) 37 SpO2: 99 % O2 Device: High Flow Nasal Cannula (HFNC) Oxygen Delivery: 40 LPM  Weight: 173 lbs 15.09 oz  General Appearance: Older male in NAD breathing on HFNC  Respiratory: coarse lung sounds, nonlabored  Cardiovascular: vad hum, driveline site c/d/i, chest incisions c/d/i, JVD ~14 cm   GI: soft, bs active   Skin: warm, well perfused, 1+ pitting edema to mid shin  Neuro: awake, alert, interactive, speech fluent, oriented to person, place, situation    Data   Recent Labs   Lab 07/24/22  0411 07/24/22  0405 07/24/22  0010 07/23/22  2124 07/23/22  1623 07/23/22  1617 07/23/22  1040 07/23/22  1037  07/23/22  0903 07/23/22  0603 07/22/22  2333 07/22/22  2223 07/22/22  0029 07/21/22  2149   WBC  --  11.7*  --  14.1*  --  15.3*  --  14.9*  --   --    < > 15.9*   < > 15.1*   HGB  --  7.4*  --  7.9*  --  8.1*  --  7.8*  --   --    < > 7.7*   < > 7.7*   MCV  --  92  --  91  --  91  --  92  --   --    < > 93   < > 91   PLT  --  352  --  368  --  350  --  344  --   --    < > 342   < > 238   INR  --  1.22*  --  1.23*  --  1.19*  --  1.27*  --   --    < > 1.34*   < > 1.29*   NA  --   --   --  136  --  136  --  137  --   --    < > 137   < > 138  138   POTASSIUM  --   --   --  3.6  --  3.9  --  3.7  --   --    < > 4.0   < > 3.9  3.9   CHLORIDE  --   --   --  97*  --  97*  --  100  --   --    < > 100   < > 102  102   CO2  --   --   --  30*  --  30*  --  27  --   --    < > 28   < > 26  26   BUN  --   --   --  21.8  --  22.7  --  23.2*  --   --    < > 23.5*   < > 25.0*  25.0*   CR  --   --   --  0.57*  --  0.57*  --  0.58*  --   --    < > 0.67   < > 0.72  0.72   ANIONGAP  --   --   --  9  --  9  --  10  --   --    < > 9   < > 10  10   ELDA  --   --   --  8.1*  --  8.1*  --  8.0*  --   --    < > 8.0*   < > 8.1*  8.1*   *  --  90 117*   < > 146*   < > 151*   < >  --    < > 150*   < > 126*  126*   ALBUMIN  --   --   --   --   --   --   --   --   --  3.0*  --  2.9*  --  2.7*   PROTTOTAL  --   --   --   --   --   --   --   --   --  6.0*  --   --   --  5.5*   BILITOTAL  --   --   --   --   --   --   --   --   --  0.6  --   --   --  0.7   ALKPHOS  --   --   --   --   --   --   --   --   --  199*  --   --   --  155*   ALT  --   --   --   --   --   --   --   --   --  12  --   --   --  11   AST  --   --   --   --   --   --   --   --   --  28  --   --   --  27    < > = values in this interval not displayed.       Medications     bumetanide Stopped (07/23/22 1900)     dextrose       heparin 1,900 Units/hr (07/24/22 0700)       acetaminophen  975 mg Oral or Feeding Tube Q8H CAMMY     aspirin  81 mg Oral or Feeding Tube Daily      atorvastatin  40 mg Oral QPM     captopril  12.5 mg Oral TID     DULoxetine  30 mg Oral Daily     fiber modular (NUTRISOURCE FIBER)  1 packet Per Feeding Tube TID     fluticasone-salmeterol  2 puff Inhalation BID     hydroxychloroquine  200 mg Oral BID     insulin aspart  1-12 Units Subcutaneous Q4H     insulin glargine  20 Units Subcutaneous QAM AC     melatonin  10 mg Oral QPM     multivitamins w/minerals  15 mL Per Feeding Tube Daily     oxyCODONE  10 mg Oral or Feeding Tube Q6H     pantoprazole  40 mg Per Feeding Tube QAM AC     perflutren diluted 1mL to 2mL with saline  5 mL Intravenous Once     protein modular  1 packet Per Feeding Tube TID     QUEtiapine  100 mg Oral or Feeding Tube At Bedtime     sodium chloride (PF)  3 mL Intracatheter Q8H     sodium chloride (PF)  3 mL Intracatheter Q8H     valproic acid  250 mg Oral 2 times per day     valproic acid  500 mg Oral QPM

## 2022-07-25 ENCOUNTER — APPOINTMENT (OUTPATIENT)
Dept: SPEECH THERAPY | Facility: CLINIC | Age: 62
DRG: 001 | End: 2022-07-25
Attending: INTERNAL MEDICINE
Payer: COMMERCIAL

## 2022-07-25 ENCOUNTER — APPOINTMENT (OUTPATIENT)
Dept: GENERAL RADIOLOGY | Facility: CLINIC | Age: 62
DRG: 001 | End: 2022-07-25
Attending: THORACIC SURGERY (CARDIOTHORACIC VASCULAR SURGERY)
Payer: COMMERCIAL

## 2022-07-25 ENCOUNTER — APPOINTMENT (OUTPATIENT)
Dept: GENERAL RADIOLOGY | Facility: CLINIC | Age: 62
DRG: 001 | End: 2022-07-25
Attending: INTERNAL MEDICINE
Payer: COMMERCIAL

## 2022-07-25 ENCOUNTER — APPOINTMENT (OUTPATIENT)
Dept: GENERAL RADIOLOGY | Facility: CLINIC | Age: 62
DRG: 001 | End: 2022-07-25
Attending: STUDENT IN AN ORGANIZED HEALTH CARE EDUCATION/TRAINING PROGRAM
Payer: COMMERCIAL

## 2022-07-25 LAB
ABO/RH(D): NORMAL
ALBUMIN SERPL BCG-MCNC: 2.8 G/DL (ref 3.5–5.2)
ALBUMIN SERPL BCG-MCNC: 2.9 G/DL (ref 3.5–5.2)
ALBUMIN SERPL BCG-MCNC: 2.9 G/DL (ref 3.5–5.2)
ALP SERPL-CCNC: 203 U/L (ref 40–129)
ALP SERPL-CCNC: 234 U/L (ref 40–129)
ALT SERPL W P-5'-P-CCNC: 12 U/L (ref 10–50)
ALT SERPL W P-5'-P-CCNC: 12 U/L (ref 10–50)
ANION GAP SERPL CALCULATED.3IONS-SCNC: 10 MMOL/L (ref 7–15)
ANION GAP SERPL CALCULATED.3IONS-SCNC: 11 MMOL/L (ref 7–15)
ANION GAP SERPL CALCULATED.3IONS-SCNC: 11 MMOL/L (ref 7–15)
ANION GAP SERPL CALCULATED.3IONS-SCNC: 9 MMOL/L (ref 7–15)
ANTIBODY SCREEN: NEGATIVE
AST SERPL W P-5'-P-CCNC: 27 U/L (ref 10–50)
AST SERPL W P-5'-P-CCNC: 32 U/L (ref 10–50)
AT III ACT/NOR PPP CHRO: 92 % (ref 85–135)
BACTERIA BLD CULT: NO GROWTH
BASE EXCESS BLDA CALC-SCNC: 11 MMOL/L (ref -9–1.8)
BASE EXCESS BLDA CALC-SCNC: 11.5 MMOL/L (ref -9–1.8)
BASOPHILS # BLD AUTO: 0.1 10E3/UL (ref 0–0.2)
BASOPHILS # BLD AUTO: 0.1 10E3/UL (ref 0–0.2)
BASOPHILS NFR BLD AUTO: 0 %
BASOPHILS NFR BLD AUTO: 0 %
BILIRUB DIRECT SERPL-MCNC: 0.25 MG/DL (ref 0–0.3)
BILIRUB SERPL-MCNC: 0.6 MG/DL
BILIRUB SERPL-MCNC: 0.6 MG/DL
BUN SERPL-MCNC: 17.2 MG/DL (ref 8–23)
BUN SERPL-MCNC: 18.2 MG/DL (ref 8–23)
BUN SERPL-MCNC: 18.2 MG/DL (ref 8–23)
BUN SERPL-MCNC: 19.7 MG/DL (ref 8–23)
CA-I BLD-MCNC: 4 MG/DL (ref 4.4–5.2)
CA-I BLD-MCNC: 4.3 MG/DL (ref 4.4–5.2)
CA-I BLD-MCNC: 4.3 MG/DL (ref 4.4–5.2)
CA-I BLD-MCNC: 4.4 MG/DL (ref 4.4–5.2)
CALCIUM SERPL-MCNC: 8.3 MG/DL (ref 8.8–10.2)
CALCIUM SERPL-MCNC: 8.4 MG/DL (ref 8.8–10.2)
CALCIUM SERPL-MCNC: 8.5 MG/DL (ref 8.8–10.2)
CALCIUM SERPL-MCNC: 8.6 MG/DL (ref 8.8–10.2)
CHLORIDE SERPL-SCNC: 91 MMOL/L (ref 98–107)
CHLORIDE SERPL-SCNC: 92 MMOL/L (ref 98–107)
CHLORIDE SERPL-SCNC: 93 MMOL/L (ref 98–107)
CHLORIDE SERPL-SCNC: 95 MMOL/L (ref 98–107)
CREAT SERPL-MCNC: 0.57 MG/DL (ref 0.67–1.17)
CREAT SERPL-MCNC: 0.59 MG/DL (ref 0.67–1.17)
CREAT SERPL-MCNC: 0.6 MG/DL (ref 0.67–1.17)
CREAT SERPL-MCNC: 0.66 MG/DL (ref 0.67–1.17)
D DIMER PPP FEU-MCNC: 6.96 UG/ML FEU (ref 0–0.5)
D DIMER PPP FEU-MCNC: 7.38 UG/ML FEU (ref 0–0.5)
DEPRECATED HCO3 PLAS-SCNC: 30 MMOL/L (ref 22–29)
DEPRECATED HCO3 PLAS-SCNC: 31 MMOL/L (ref 22–29)
DEPRECATED HCO3 PLAS-SCNC: 33 MMOL/L (ref 22–29)
DEPRECATED HCO3 PLAS-SCNC: 33 MMOL/L (ref 22–29)
EOSINOPHIL # BLD AUTO: 0.3 10E3/UL (ref 0–0.7)
EOSINOPHIL # BLD AUTO: 0.3 10E3/UL (ref 0–0.7)
EOSINOPHIL NFR BLD AUTO: 2 %
EOSINOPHIL NFR BLD AUTO: 3 %
ERYTHROCYTE [DISTWIDTH] IN BLOOD BY AUTOMATED COUNT: 18.6 % (ref 10–15)
ERYTHROCYTE [DISTWIDTH] IN BLOOD BY AUTOMATED COUNT: 18.8 % (ref 10–15)
ERYTHROCYTE [DISTWIDTH] IN BLOOD BY AUTOMATED COUNT: 18.8 % (ref 10–15)
FIBRINOGEN PPP-MCNC: 476 MG/DL (ref 170–490)
FIBRINOGEN PPP-MCNC: 496 MG/DL (ref 170–490)
GFR SERPL CREATININE-BSD FRML MDRD: >90 ML/MIN/1.73M2
GLUCOSE BLDC GLUCOMTR-MCNC: 117 MG/DL (ref 70–99)
GLUCOSE BLDC GLUCOMTR-MCNC: 117 MG/DL (ref 70–99)
GLUCOSE BLDC GLUCOMTR-MCNC: 148 MG/DL (ref 70–99)
GLUCOSE BLDC GLUCOMTR-MCNC: 159 MG/DL (ref 70–99)
GLUCOSE BLDC GLUCOMTR-MCNC: 167 MG/DL (ref 70–99)
GLUCOSE SERPL-MCNC: 116 MG/DL (ref 70–99)
GLUCOSE SERPL-MCNC: 144 MG/DL (ref 70–99)
GLUCOSE SERPL-MCNC: 156 MG/DL (ref 70–99)
GLUCOSE SERPL-MCNC: 157 MG/DL (ref 70–99)
HCO3 BLD-SCNC: 35 MMOL/L (ref 21–28)
HCO3 BLD-SCNC: 36 MMOL/L (ref 21–28)
HCT VFR BLD AUTO: 26.9 % (ref 40–53)
HCT VFR BLD AUTO: 28.1 % (ref 40–53)
HCT VFR BLD AUTO: 28.3 % (ref 40–53)
HGB BLD-MCNC: 8.5 G/DL (ref 13.3–17.7)
HGB BLD-MCNC: 9.1 G/DL (ref 13.3–17.7)
HGB BLD-MCNC: 9.2 G/DL (ref 13.3–17.7)
HGB FREE PLAS-MCNC: 30 MG/DL
IMM GRANULOCYTES # BLD: 0.2 10E3/UL
IMM GRANULOCYTES # BLD: 0.3 10E3/UL
IMM GRANULOCYTES NFR BLD: 2 %
IMM GRANULOCYTES NFR BLD: 2 %
INR PPP: 1.16 (ref 0.85–1.15)
INR PPP: 1.2 (ref 0.85–1.15)
LACTATE SERPL-SCNC: 1.2 MMOL/L (ref 0.7–2)
LACTATE SERPL-SCNC: 1.6 MMOL/L (ref 0.7–2)
LACTATE SERPL-SCNC: 2.2 MMOL/L (ref 0.7–2)
LYMPHOCYTES # BLD AUTO: 1.4 10E3/UL (ref 0.8–5.3)
LYMPHOCYTES # BLD AUTO: 1.6 10E3/UL (ref 0.8–5.3)
LYMPHOCYTES NFR BLD AUTO: 13 %
LYMPHOCYTES NFR BLD AUTO: 9 %
MAGNESIUM SERPL-MCNC: 1.8 MG/DL (ref 1.7–2.3)
MCH RBC QN AUTO: 29.3 PG (ref 26.5–33)
MCH RBC QN AUTO: 29.4 PG (ref 26.5–33)
MCH RBC QN AUTO: 29.8 PG (ref 26.5–33)
MCHC RBC AUTO-ENTMCNC: 31.6 G/DL (ref 31.5–36.5)
MCHC RBC AUTO-ENTMCNC: 32.4 G/DL (ref 31.5–36.5)
MCHC RBC AUTO-ENTMCNC: 32.5 G/DL (ref 31.5–36.5)
MCV RBC AUTO: 91 FL (ref 78–100)
MCV RBC AUTO: 92 FL (ref 78–100)
MCV RBC AUTO: 93 FL (ref 78–100)
MONOCYTES # BLD AUTO: 1 10E3/UL (ref 0–1.3)
MONOCYTES # BLD AUTO: 1 10E3/UL (ref 0–1.3)
MONOCYTES NFR BLD AUTO: 7 %
MONOCYTES NFR BLD AUTO: 8 %
NEUTROPHILS # BLD AUTO: 11.9 10E3/UL (ref 1.6–8.3)
NEUTROPHILS # BLD AUTO: 9.1 10E3/UL (ref 1.6–8.3)
NEUTROPHILS NFR BLD AUTO: 74 %
NEUTROPHILS NFR BLD AUTO: 80 %
NRBC # BLD AUTO: 0 10E3/UL
NRBC # BLD AUTO: 0 10E3/UL
NRBC BLD AUTO-RTO: 0 /100
NRBC BLD AUTO-RTO: 0 /100
O2/TOTAL GAS SETTING VFR VENT: 30 %
O2/TOTAL GAS SETTING VFR VENT: 4 %
OXYHGB MFR BLD: 97 % (ref 92–100)
PCO2 BLD: 46 MM HG (ref 35–45)
PCO2 BLD: 46 MM HG (ref 35–45)
PH BLD: 7.5 [PH] (ref 7.35–7.45)
PH BLD: 7.5 [PH] (ref 7.35–7.45)
PHOSPHATE SERPL-MCNC: 3.9 MG/DL (ref 2.5–4.5)
PLATELET # BLD AUTO: 357 10E3/UL (ref 150–450)
PLATELET # BLD AUTO: 377 10E3/UL (ref 150–450)
PLATELET # BLD AUTO: 388 10E3/UL (ref 150–450)
PO2 BLD: 140 MM HG (ref 80–105)
PO2 BLD: 91 MM HG (ref 80–105)
POTASSIUM SERPL-SCNC: 3.9 MMOL/L (ref 3.4–5.3)
POTASSIUM SERPL-SCNC: 3.9 MMOL/L (ref 3.4–5.3)
POTASSIUM SERPL-SCNC: 4.2 MMOL/L (ref 3.4–5.3)
POTASSIUM SERPL-SCNC: 4.4 MMOL/L (ref 3.4–5.3)
PROT SERPL-MCNC: 5.8 G/DL (ref 6.4–8.3)
PROT SERPL-MCNC: 6.1 G/DL (ref 6.4–8.3)
RBC # BLD AUTO: 2.9 10E6/UL (ref 4.4–5.9)
RBC # BLD AUTO: 3.09 10E6/UL (ref 4.4–5.9)
RBC # BLD AUTO: 3.09 10E6/UL (ref 4.4–5.9)
SODIUM SERPL-SCNC: 133 MMOL/L (ref 136–145)
SODIUM SERPL-SCNC: 134 MMOL/L (ref 136–145)
SODIUM SERPL-SCNC: 135 MMOL/L (ref 136–145)
SODIUM SERPL-SCNC: 137 MMOL/L (ref 136–145)
SPECIMEN EXPIRATION DATE: NORMAL
UFH PPP CHRO-ACNC: 0.36 IU/ML
UFH PPP CHRO-ACNC: 0.42 IU/ML
UFH PPP CHRO-ACNC: 0.64 IU/ML
WBC # BLD AUTO: 12.2 10E3/UL (ref 4–11)
WBC # BLD AUTO: 14.9 10E3/UL (ref 4–11)
WBC # BLD AUTO: 14.9 10E3/UL (ref 4–11)

## 2022-07-25 PROCEDURE — 93750 INTERROGATION VAD IN PERSON: CPT | Performed by: INTERNAL MEDICINE

## 2022-07-25 PROCEDURE — 250N000013 HC RX MED GY IP 250 OP 250 PS 637: Performed by: STUDENT IN AN ORGANIZED HEALTH CARE EDUCATION/TRAINING PROGRAM

## 2022-07-25 PROCEDURE — 250N000013 HC RX MED GY IP 250 OP 250 PS 637: Performed by: SURGERY

## 2022-07-25 PROCEDURE — 250N000011 HC RX IP 250 OP 636: Performed by: SURGERY

## 2022-07-25 PROCEDURE — 82248 BILIRUBIN DIRECT: CPT | Performed by: NURSE PRACTITIONER

## 2022-07-25 PROCEDURE — 250N000013 HC RX MED GY IP 250 OP 250 PS 637: Performed by: ANESTHESIOLOGY

## 2022-07-25 PROCEDURE — 85384 FIBRINOGEN ACTIVITY: CPT | Performed by: SURGERY

## 2022-07-25 PROCEDURE — 83605 ASSAY OF LACTIC ACID: CPT | Performed by: STUDENT IN AN ORGANIZED HEALTH CARE EDUCATION/TRAINING PROGRAM

## 2022-07-25 PROCEDURE — 83605 ASSAY OF LACTIC ACID: CPT | Performed by: SURGERY

## 2022-07-25 PROCEDURE — 36415 COLL VENOUS BLD VENIPUNCTURE: CPT | Performed by: SURGERY

## 2022-07-25 PROCEDURE — 71045 X-RAY EXAM CHEST 1 VIEW: CPT | Mod: 26 | Performed by: RADIOLOGY

## 2022-07-25 PROCEDURE — 85014 HEMATOCRIT: CPT | Performed by: STUDENT IN AN ORGANIZED HEALTH CARE EDUCATION/TRAINING PROGRAM

## 2022-07-25 PROCEDURE — 71045 X-RAY EXAM CHEST 1 VIEW: CPT | Mod: 77

## 2022-07-25 PROCEDURE — 36415 COLL VENOUS BLD VENIPUNCTURE: CPT | Performed by: THORACIC SURGERY (CARDIOTHORACIC VASCULAR SURGERY)

## 2022-07-25 PROCEDURE — 82805 BLOOD GASES W/O2 SATURATION: CPT | Performed by: STUDENT IN AN ORGANIZED HEALTH CARE EDUCATION/TRAINING PROGRAM

## 2022-07-25 PROCEDURE — 84100 ASSAY OF PHOSPHORUS: CPT | Performed by: SURGERY

## 2022-07-25 PROCEDURE — 85300 ANTITHROMBIN III ACTIVITY: CPT | Performed by: SURGERY

## 2022-07-25 PROCEDURE — 92526 ORAL FUNCTION THERAPY: CPT | Mod: GN

## 2022-07-25 PROCEDURE — 87040 BLOOD CULTURE FOR BACTERIA: CPT | Performed by: THORACIC SURGERY (CARDIOTHORACIC VASCULAR SURGERY)

## 2022-07-25 PROCEDURE — 250N000013 HC RX MED GY IP 250 OP 250 PS 637: Performed by: INTERNAL MEDICINE

## 2022-07-25 PROCEDURE — 83051 HEMOGLOBIN PLASMA: CPT | Performed by: SURGERY

## 2022-07-25 PROCEDURE — 71045 X-RAY EXAM CHEST 1 VIEW: CPT

## 2022-07-25 PROCEDURE — P9016 RBC LEUKOCYTES REDUCED: HCPCS | Performed by: STUDENT IN AN ORGANIZED HEALTH CARE EDUCATION/TRAINING PROGRAM

## 2022-07-25 PROCEDURE — 83735 ASSAY OF MAGNESIUM: CPT | Performed by: SURGERY

## 2022-07-25 PROCEDURE — 85520 HEPARIN ASSAY: CPT | Performed by: STUDENT IN AN ORGANIZED HEALTH CARE EDUCATION/TRAINING PROGRAM

## 2022-07-25 PROCEDURE — 250N000011 HC RX IP 250 OP 636: Performed by: STUDENT IN AN ORGANIZED HEALTH CARE EDUCATION/TRAINING PROGRAM

## 2022-07-25 PROCEDURE — 250N000012 HC RX MED GY IP 250 OP 636 PS 637: Performed by: NURSE PRACTITIONER

## 2022-07-25 PROCEDURE — 999N000015 HC STATISTIC ARTERIAL MONITORING DAILY

## 2022-07-25 PROCEDURE — 258N000003 HC RX IP 258 OP 636: Performed by: STUDENT IN AN ORGANIZED HEALTH CARE EDUCATION/TRAINING PROGRAM

## 2022-07-25 PROCEDURE — 250N000013 HC RX MED GY IP 250 OP 250 PS 637: Performed by: THORACIC SURGERY (CARDIOTHORACIC VASCULAR SURGERY)

## 2022-07-25 PROCEDURE — 999N000128 HC STATISTIC PERIPHERAL IV START W/O US GUIDANCE

## 2022-07-25 PROCEDURE — 82330 ASSAY OF CALCIUM: CPT | Performed by: SURGERY

## 2022-07-25 PROCEDURE — 82310 ASSAY OF CALCIUM: CPT | Performed by: SURGERY

## 2022-07-25 PROCEDURE — 85520 HEPARIN ASSAY: CPT | Performed by: THORACIC SURGERY (CARDIOTHORACIC VASCULAR SURGERY)

## 2022-07-25 PROCEDURE — 85379 FIBRIN DEGRADATION QUANT: CPT | Performed by: SURGERY

## 2022-07-25 PROCEDURE — 200N000002 HC R&B ICU UMMC

## 2022-07-25 PROCEDURE — 87040 BLOOD CULTURE FOR BACTERIA: CPT | Performed by: SURGERY

## 2022-07-25 PROCEDURE — 999N000155 HC STATISTIC RAPCV CVP MONITORING

## 2022-07-25 PROCEDURE — 80053 COMPREHEN METABOLIC PANEL: CPT | Performed by: STUDENT IN AN ORGANIZED HEALTH CARE EDUCATION/TRAINING PROGRAM

## 2022-07-25 PROCEDURE — 85610 PROTHROMBIN TIME: CPT | Performed by: SURGERY

## 2022-07-25 PROCEDURE — 250N000009 HC RX 250: Performed by: STUDENT IN AN ORGANIZED HEALTH CARE EDUCATION/TRAINING PROGRAM

## 2022-07-25 PROCEDURE — 82803 BLOOD GASES ANY COMBINATION: CPT | Performed by: STUDENT IN AN ORGANIZED HEALTH CARE EDUCATION/TRAINING PROGRAM

## 2022-07-25 PROCEDURE — 250N000011 HC RX IP 250 OP 636: Performed by: THORACIC SURGERY (CARDIOTHORACIC VASCULAR SURGERY)

## 2022-07-25 PROCEDURE — 258N000003 HC RX IP 258 OP 636: Performed by: THORACIC SURGERY (CARDIOTHORACIC VASCULAR SURGERY)

## 2022-07-25 PROCEDURE — 99291 CRITICAL CARE FIRST HOUR: CPT | Mod: 25 | Performed by: INTERNAL MEDICINE

## 2022-07-25 PROCEDURE — 85025 COMPLETE CBC W/AUTO DIFF WBC: CPT | Performed by: SURGERY

## 2022-07-25 RX ORDER — NOREPINEPHRINE BITARTRATE 0.06 MG/ML
INJECTION, SOLUTION INTRAVENOUS
Status: DISCONTINUED
Start: 2022-07-25 | End: 2022-07-26 | Stop reason: HOSPADM

## 2022-07-25 RX ORDER — POTASSIUM CHLORIDE 20MEQ/15ML
40 LIQUID (ML) ORAL ONCE
Status: COMPLETED | OUTPATIENT
Start: 2022-07-25 | End: 2022-07-25

## 2022-07-25 RX ORDER — BUMETANIDE 0.25 MG/ML
4 INJECTION INTRAMUSCULAR; INTRAVENOUS
Status: DISCONTINUED | OUTPATIENT
Start: 2022-07-25 | End: 2022-07-26

## 2022-07-25 RX ORDER — MAGNESIUM SULFATE HEPTAHYDRATE 40 MG/ML
2 INJECTION, SOLUTION INTRAVENOUS ONCE
Status: COMPLETED | OUTPATIENT
Start: 2022-07-25 | End: 2022-07-25

## 2022-07-25 RX ORDER — BUMETANIDE 0.25 MG/ML
4 INJECTION INTRAMUSCULAR; INTRAVENOUS ONCE
Status: COMPLETED | OUTPATIENT
Start: 2022-07-25 | End: 2022-07-25

## 2022-07-25 RX ORDER — CALCIUM GLUCONATE 20 MG/ML
2 INJECTION, SOLUTION INTRAVENOUS
Status: ACTIVE | OUTPATIENT
Start: 2022-07-25 | End: 2022-07-28

## 2022-07-25 RX ORDER — NOREPINEPHRINE BITARTRATE 0.06 MG/ML
.01-.6 INJECTION, SOLUTION INTRAVENOUS CONTINUOUS
Status: DISCONTINUED | OUTPATIENT
Start: 2022-07-25 | End: 2022-07-27

## 2022-07-25 RX ORDER — POTASSIUM CHLORIDE 750 MG/1
20 TABLET, EXTENDED RELEASE ORAL ONCE
Status: COMPLETED | OUTPATIENT
Start: 2022-07-25 | End: 2022-07-25

## 2022-07-25 RX ORDER — MUPIROCIN 20 MG/G
1 OINTMENT TOPICAL 2 TIMES DAILY
Status: CANCELLED | OUTPATIENT
Start: 2022-07-25 | End: 2022-07-29

## 2022-07-25 RX ORDER — ASPIRIN 81 MG/1
162 TABLET, CHEWABLE ORAL DAILY
Status: CANCELLED | OUTPATIENT
Start: 2022-07-26

## 2022-07-25 RX ORDER — CALCIUM GLUCONATE 20 MG/ML
1 INJECTION, SOLUTION INTRAVENOUS
Status: DISPENSED | OUTPATIENT
Start: 2022-07-25 | End: 2022-07-28

## 2022-07-25 RX ORDER — PIPERACILLIN SODIUM, TAZOBACTAM SODIUM 4; .5 G/20ML; G/20ML
4.5 INJECTION, POWDER, LYOPHILIZED, FOR SOLUTION INTRAVENOUS EVERY 6 HOURS
Status: DISCONTINUED | OUTPATIENT
Start: 2022-07-25 | End: 2022-07-26

## 2022-07-25 RX ORDER — CHLOROTHIAZIDE SODIUM 500 MG/1
500 INJECTION INTRAVENOUS ONCE
Status: COMPLETED | OUTPATIENT
Start: 2022-07-25 | End: 2022-07-25

## 2022-07-25 RX ORDER — WARFARIN SODIUM 5 MG/1
5 TABLET ORAL
Status: COMPLETED | OUTPATIENT
Start: 2022-07-25 | End: 2022-07-25

## 2022-07-25 RX ORDER — CALCIUM GLUCONATE 20 MG/ML
1 INJECTION, SOLUTION INTRAVENOUS ONCE
Status: DISCONTINUED | OUTPATIENT
Start: 2022-07-25 | End: 2022-07-27

## 2022-07-25 RX ADMIN — CALCIUM GLUCONATE 1 G: 20 INJECTION, SOLUTION INTRAVENOUS at 21:28

## 2022-07-25 RX ADMIN — EPINEPHRINE 0.05 MCG/KG/MIN: 1 INJECTION INTRAMUSCULAR; INTRAVENOUS; SUBCUTANEOUS at 21:04

## 2022-07-25 RX ADMIN — HYDROXYCHLOROQUINE SULFATE 200 MG: 200 TABLET, FILM COATED ORAL at 07:43

## 2022-07-25 RX ADMIN — ONDANSETRON 4 MG: 2 INJECTION INTRAMUSCULAR; INTRAVENOUS at 19:52

## 2022-07-25 RX ADMIN — ASPIRIN 81 MG CHEWABLE TABLET 81 MG: 81 TABLET CHEWABLE at 07:42

## 2022-07-25 RX ADMIN — HEPARIN SODIUM AND DEXTROSE 2050 UNITS/HR: 10000; 5 INJECTION INTRAVENOUS at 17:57

## 2022-07-25 RX ADMIN — FLUTICASONE PROPIONATE AND SALMETEROL XINAFOATE 2 PUFF: 115; 21 AEROSOL, METERED RESPIRATORY (INHALATION) at 07:42

## 2022-07-25 RX ADMIN — POTASSIUM CHLORIDE 20 MEQ: 750 TABLET, EXTENDED RELEASE ORAL at 01:20

## 2022-07-25 RX ADMIN — HYDROXYZINE HYDROCHLORIDE 50 MG: 25 TABLET ORAL at 19:56

## 2022-07-25 RX ADMIN — QUETIAPINE FUMARATE 100 MG: 100 TABLET ORAL at 20:30

## 2022-07-25 RX ADMIN — BUMETANIDE 4 MG: 0.25 INJECTION, SOLUTION INTRAMUSCULAR; INTRAVENOUS at 18:00

## 2022-07-25 RX ADMIN — CALCIUM GLUCONATE 1 G: 20 INJECTION, SOLUTION INTRAVENOUS at 16:00

## 2022-07-25 RX ADMIN — VALPROIC ACID 500 MG: 250 SOLUTION ORAL at 20:12

## 2022-07-25 RX ADMIN — CHLOROTHIAZIDE SODIUM 500 MG: 500 INJECTION, POWDER, LYOPHILIZED, FOR SOLUTION INTRAVENOUS at 18:10

## 2022-07-25 RX ADMIN — BUMETANIDE 4 MG: 0.25 INJECTION INTRAMUSCULAR; INTRAVENOUS at 09:51

## 2022-07-25 RX ADMIN — INSULIN ASPART 1 UNITS: 100 INJECTION, SOLUTION INTRAVENOUS; SUBCUTANEOUS at 11:27

## 2022-07-25 RX ADMIN — LIDOCAINE PATCH 4% 1 PATCH: 40 PATCH TOPICAL at 19:58

## 2022-07-25 RX ADMIN — OXYCODONE HYDROCHLORIDE 10 MG: 5 TABLET ORAL at 22:35

## 2022-07-25 RX ADMIN — WARFARIN SODIUM 5 MG: 5 TABLET ORAL at 17:59

## 2022-07-25 RX ADMIN — VALPROIC ACID 250 MG: 250 SOLUTION ORAL at 13:13

## 2022-07-25 RX ADMIN — BUMETANIDE 4 MG: 0.25 INJECTION, SOLUTION INTRAMUSCULAR; INTRAVENOUS at 13:10

## 2022-07-25 RX ADMIN — ACETAMINOPHEN 975 MG: 325 TABLET, FILM COATED ORAL at 21:18

## 2022-07-25 RX ADMIN — LIDOCAINE PATCH 4% 1 PATCH: 40 PATCH TOPICAL at 01:20

## 2022-07-25 RX ADMIN — HYDROXYCHLOROQUINE SULFATE 200 MG: 200 TABLET, FILM COATED ORAL at 19:57

## 2022-07-25 RX ADMIN — SODIUM CHLORIDE, POTASSIUM CHLORIDE, SODIUM LACTATE AND CALCIUM CHLORIDE 500 ML: 600; 310; 30; 20 INJECTION, SOLUTION INTRAVENOUS at 22:05

## 2022-07-25 RX ADMIN — OXYCODONE HYDROCHLORIDE 10 MG: 5 TABLET ORAL at 15:19

## 2022-07-25 RX ADMIN — PIPERACILLIN AND TAZOBACTAM 4.5 G: 4; .5 INJECTION, POWDER, FOR SOLUTION INTRAVENOUS at 22:11

## 2022-07-25 RX ADMIN — Medication 12.5 MG: at 07:43

## 2022-07-25 RX ADMIN — INSULIN ASPART 1 UNITS: 100 INJECTION, SOLUTION INTRAVENOUS; SUBCUTANEOUS at 15:30

## 2022-07-25 RX ADMIN — HYDROMORPHONE HYDROCHLORIDE 0.2 MG: 0.2 INJECTION, SOLUTION INTRAMUSCULAR; INTRAVENOUS; SUBCUTANEOUS at 06:59

## 2022-07-25 RX ADMIN — INSULIN ASPART 4 UNITS: 100 INJECTION, SOLUTION INTRAVENOUS; SUBCUTANEOUS at 23:54

## 2022-07-25 RX ADMIN — ACETAMINOPHEN 975 MG: 325 TABLET, FILM COATED ORAL at 05:42

## 2022-07-25 RX ADMIN — Medication 0.35 MCG/KG/MIN: at 21:02

## 2022-07-25 RX ADMIN — DULOXETINE 30 MG: 30 CAPSULE, DELAYED RELEASE ORAL at 07:43

## 2022-07-25 RX ADMIN — Medication 10 MG: at 19:58

## 2022-07-25 RX ADMIN — MULTIVITAMIN 15 ML: LIQUID ORAL at 07:43

## 2022-07-25 RX ADMIN — Medication 1 PACKET: at 19:42

## 2022-07-25 RX ADMIN — INSULIN GLARGINE 20 UNITS: 100 INJECTION, SOLUTION SUBCUTANEOUS at 07:42

## 2022-07-25 RX ADMIN — Medication 40 MG: at 07:43

## 2022-07-25 RX ADMIN — ONDANSETRON 4 MG: 2 INJECTION INTRAMUSCULAR; INTRAVENOUS at 08:12

## 2022-07-25 RX ADMIN — VANCOMYCIN HYDROCHLORIDE 2000 MG: 1 INJECTION, POWDER, LYOPHILIZED, FOR SOLUTION INTRAVENOUS at 22:06

## 2022-07-25 RX ADMIN — Medication 1 PACKET: at 13:13

## 2022-07-25 RX ADMIN — OXYCODONE HYDROCHLORIDE 10 MG: 5 TABLET ORAL at 03:27

## 2022-07-25 RX ADMIN — Medication 12.5 MG: at 13:13

## 2022-07-25 RX ADMIN — INSULIN ASPART 2 UNITS: 100 INJECTION, SOLUTION INTRAVENOUS; SUBCUTANEOUS at 07:42

## 2022-07-25 RX ADMIN — HYDROMORPHONE HYDROCHLORIDE 0.2 MG: 0.2 INJECTION, SOLUTION INTRAMUSCULAR; INTRAVENOUS; SUBCUTANEOUS at 02:29

## 2022-07-25 RX ADMIN — Medication 1 PACKET: at 07:42

## 2022-07-25 RX ADMIN — OXYCODONE HYDROCHLORIDE 10 MG: 5 TABLET ORAL at 09:50

## 2022-07-25 RX ADMIN — ONDANSETRON 4 MG: 2 INJECTION INTRAMUSCULAR; INTRAVENOUS at 13:08

## 2022-07-25 RX ADMIN — HYDROXYZINE HYDROCHLORIDE 25 MG: 25 TABLET ORAL at 02:29

## 2022-07-25 RX ADMIN — MAGNESIUM SULFATE IN WATER 2 G: 40 INJECTION, SOLUTION INTRAVENOUS at 05:17

## 2022-07-25 RX ADMIN — SODIUM CHLORIDE, POTASSIUM CHLORIDE, SODIUM LACTATE AND CALCIUM CHLORIDE 500 ML: 600; 310; 30; 20 INJECTION, SOLUTION INTRAVENOUS at 20:51

## 2022-07-25 RX ADMIN — ACETAMINOPHEN 975 MG: 325 TABLET, FILM COATED ORAL at 13:12

## 2022-07-25 RX ADMIN — ATORVASTATIN CALCIUM 40 MG: 40 TABLET, FILM COATED ORAL at 19:58

## 2022-07-25 RX ADMIN — POTASSIUM CHLORIDE 40 MEQ: 40 SOLUTION ORAL at 17:59

## 2022-07-25 RX ADMIN — VALPROIC ACID 250 MG: 250 SOLUTION ORAL at 07:42

## 2022-07-25 RX ADMIN — Medication 12.5 MG: at 19:44

## 2022-07-25 ASSESSMENT — ACTIVITIES OF DAILY LIVING (ADL)
ADLS_ACUITY_SCORE: 36

## 2022-07-25 NOTE — PROGRESS NOTES
Major Shift Events:    Neuro: A/O x3-4 can be forgetful/delirious at times, weak, Navneet FINCH 4-5s  CV: SR-ST, BP stable, afebrile, LVAD WNl, flows 3.5-4.1, index 2.7-6.8 & unger 3.2-3.9 RPM increased to 5300  Respi: LS dim/ throughout on HFNC 30% & 40L  Gi/Gu:  TF at goal, standard FWF, rectal tube in place, good UOP, bumex gtt restarted this AM pt with very large UOP- watching labs & lytes closely     Plan: Work with therapies, wean O2

## 2022-07-25 NOTE — PLAN OF CARE
Major Shift Events: Pt alert and oriented x2-3 throughout shift. Forgetful, but able to follow commands and use call light appropriately. Pt transitioned from HFNC 30/30 to NC at 4L and tolerating well. ST on telemetry. BP stable. HM3 LVAD in place with no alarms. Pt diuresed per cardiology today with large amounts of UOP via marcelo. TF at goal through NJ. Pt advanced to regular diet, but doesn't have much of an appetite at this time. Rectal tube in place.     Plan: Monitor hemodynamics and respiratory status. Transfer to floor when bed becomes available.     For vital signs and complete assessments, please see documentation flowsheets.

## 2022-07-25 NOTE — PROGRESS NOTES
[facility]    Cardiology Progress Note- Cards 2        Date of Admission:  6/17/2022     Assessment & Plan: HVSL   Dandy EDUARD Sands is a 61 year old male with PMH of lupus, antiphospholipid syndrome, HTN, HLD, HFrEF 2/2 ICM who presented with cardiogenic shock c/b multiorgan failure (JOSE, shock liver) requiring mechanical support with IABP, now s/p HM3 LVAD 7/8/22 by Dr. Zamora with intraoperative course c/b RV failure s/p 29F Protek duo via RIJ. Post op course c/b hemopericardium s/p repeat washout with chest left open. Chest now closed 7/13/22 and decannulated from RVAD 7/21    #HFrEF 2/2 ICM s/p HM3 LVAD in setting of cardiogenic shock (SCAI D)  #RV failure s/p Protek duo temporary RVAD s/p decannulation 7/21  #RV thrombus  #Post chest closure hemopericardium s/p repeat washout (7/12)  Patient with ICM s/p HM3 LVAD 7/8/22 by Dr. Zamora in setting of presentation for cardiogenic shock requiring MCS with IABP as prior to HM (initially evaluated for heart transplant, however noted to have substantially elevated DSAs during course of care, so decision made to proceed with LVAD given patient already on temporary MCS). Intraoperative course c/b RV failure resulting in cardiogenic shock requiring high dose pressors necessitating RVAD support. Chest closed 7/11 and Ashli weaned. However developed tachycardia, lactic acidosis and decreased hemoglobin with CT scan noting hemopericardium. Returned to OR where underwent washout with large clot burden noted over the right atrium and around LVAD outflow graft. Chest left open and returned to ICU where pressors were able to be weaned. Noted to have stable RVAD/LVAD flows throughout and post procedurally. Ultimately returned to OR for repeat closure. Currently hemodynamically stable with stable end-organ function and hemoglobin. Extubated and decannulated from RVAD on 7/21 with good tolerance from hemodynamic profile. Will  continue to monitor closely to optimize LVAD flows and end organ function. Did have abrupt onset of tachypnea and hypoxia on 7/23 with unknown etiology that has resolved with diuresis.   -LVAD: continues to have good flows, no alarms and stable end organ perfusion; continue speed at 5300  -AC, antiplatelets: continue ASA; heparin high intensity for RV thrombus; warfarin started  -Volume status: appears slightly hypervolemic; pause bumex gtt today given cramping/achiness; will start intermittent bolus dosing of bumex 4mg IV TID and assess response with goal net negative 1-2L  -rhythm: sinus   -of note, patient with climbing capture threshold of RV lead; will need to be evaluated in future by EP team  -hypertension: continue captopril 12.5mg TID    The patient's care was discussed with the Attending Physician, Dr. Lora, Bedside Nurse and Primary team.     Dispo: medically appears ready for transfer to floor    Emelyn Meneses MD  [facility]    ______________________________________________________________________    Interval History   Nursing notes reviewed. Noted for soft BPs overnight, given some IVF in response as well as 1U PRBC. This AM notes some diffuse cramping/achiness. Otherwise feels he is doing well.     Data reviewed today: I reviewed all medications, new labs and imaging results over the last 24 hours    Physical Exam   Vital Signs: Temp: 98.7  F (37.1  C) Temp src: Oral  Pulse: 104   Resp: 19 SpO2: 99 % O2 Device: High Flow Nasal Cannula (HFNC) Oxygen Delivery: 30 LPM  Weight: 173 lbs 8.03 oz  General Appearance: Older male in NAD breathing on HFNC  Respiratory: coarse lung sounds, nonlabored  Cardiovascular: vad hum, driveline site c/d/i, chest incisions c/d/i, JVD ~10 cm   GI: soft, bs active   Skin: warm, well perfused, trace pitting edema to mid shin  Neuro: awake, alert, interactive, speech fluent, oriented to person, place, situation    Data    Recent Labs   Lab 07/25/22  0542 07/25/22  0333 07/25/22  0329 07/24/22  2322 07/24/22  2156 07/24/22  1646 07/24/22  1639 07/23/22  0903 07/23/22  0603   WBC  --   --  12.2*  --  10.2  --  13.8*   < >  --    HGB  --   --  9.1*  --  7.2*  --  8.1*   < >  --    MCV  --   --  91  --  91  --  92   < >  --    PLT  --   --  357  --  372  --  412   < >  --    INR  --   --  1.20*  --  1.26*  --  1.19*   < >  --    NA  --   --  137  --  135*  --  135*   < >  --    POTASSIUM  --   --  4.2  --  3.6  --  3.4   < >  --    CHLORIDE  --   --  95*  --  93*  --  92*   < >  --    CO2  --   --  33*  --  34*  --  34*   < >  --    BUN  --   --  19.7  --  19.6  --  18.1   < >  --    CR  --   --  0.60*  --  0.62*  --  0.61*   < >  --    ANIONGAP  --   --  9  --  8  --  9   < >  --    ELDA  --   --  8.4*  --  8.1*  --  8.3*   < >  --    GLC  --  117* 116* 125* 114*   < > 125*   < >  --    ALBUMIN 2.9*  --  2.9*  --   --   --   --    < > 3.0*   PROTTOTAL 6.1*  --   --   --   --   --   --   --  6.0*   BILITOTAL 0.6  --   --   --   --   --   --   --  0.6   ALKPHOS 203*  --   --   --   --   --   --   --  199*   ALT 12  --   --   --   --   --   --   --  12   AST 27  --   --   --   --   --   --   --  28    < > = values in this interval not displayed.       Medications     bumetanide Stopped (07/24/22 2121)     dextrose       heparin 2,050 Units/hr (07/25/22 0700)       acetaminophen  975 mg Oral or Feeding Tube Q8H CAMMY     aspirin  81 mg Oral or Feeding Tube Daily     atorvastatin  40 mg Oral QPM     captopril  12.5 mg Oral TID     DULoxetine  30 mg Oral Daily     fiber modular (NUTRISOURCE FIBER)  1 packet Per Feeding Tube TID     fluticasone-salmeterol  2 puff Inhalation BID     [START ON 8/3/2022] fluticasone-vilanterol  1 puff Inhalation Daily     hydroxychloroquine  200 mg Oral BID     insulin aspart  1-12 Units Subcutaneous Q4H     insulin glargine  20 Units Subcutaneous QAM AC     lidocaine  1 patch Transdermal Q24h    And     lidocaine    Transdermal Q8H CAMMY     melatonin  10 mg Oral QPM     multivitamins w/minerals  15 mL Per Feeding Tube Daily     oxyCODONE  10 mg Oral or Feeding Tube Q6H     pantoprazole  40 mg Per Feeding Tube QAM AC     perflutren diluted 1mL to 2mL with saline  5 mL Intravenous Once     protein modular  1 packet Per Feeding Tube TID     QUEtiapine  100 mg Oral or Feeding Tube At Bedtime     sodium chloride (PF)  3 mL Intracatheter Q8H     sodium chloride (PF)  3 mL Intracatheter Q8H     valproic acid  250 mg Oral 2 times per day     valproic acid  500 mg Oral QPM     Warfarin Therapy Reminder  1 each Oral See Admin Instructions

## 2022-07-25 NOTE — PLAN OF CARE
Major Shift Events:  Pt became hypotensive with MAPs in the 40s. MD notified and 500ml LR bolus given. Bumex gtt held. MAPs returned to 70s. 1U blood given for hgb 7.2. K and Mg replaced x1. No LVAD alarms. Hep gtt therapeutic at 2,050 unit(s)/hr- recheck at 11:30 AM    Plan: Continue weaning HFNC and encourage PO intake. Transfer to floor when appropriate   For vital signs and complete assessments, please see documentation flowsheets.

## 2022-07-25 NOTE — PROGRESS NOTES
CV ICU PROGRESS NOTE  07/25/2022        CO-MORBIDITIES  1. Cardiogenic shock (H)  (primary encounter diagnosis)  2. Ischemic cardiomyopathy  3. Heart failure with reduced ejection fraction, NYHA class III (H)  4. Coronary artery disease involving native coronary artery of native heart without angina pectoris      ASSESSMENT Dandy Sands is a 61 year old male with a PMH of CAD s/p PCI to LAD, MI, ICM, acute on chronic heart failure, s/p CRT-D, severe MR, antiphospholipid antibody syndrome on chronic warfarin, SLE, HTN, HLD, recent hospitalization 5/16-5/26 s/p RCA, LAD stenting who underwent RVAD (29F Protek duo RIJ) LVAD placement (HeartMate 3) on 7/8/22 by Dr. Zamora. Intraoperative course complicated by IABP dysfunction and RV failure, requiring crash onto bypass / vasopressor support, NO, and protek placement. Now s/p chest closure and NO wean 7/11. Return to OR for hemopericardium (7/12) and chest re-closure 7/13. Protek RVAD removed 7/21.     Today's Progress:  - Continue current psych regimen  - Wean HFNC as able  - Started Warfarin 7/24  - Bumex 4mg x1 7/25; monitor response  - Remove chest tube, remove rectal tube, arterial line to come out prior to transfer to floor  - Transfer orders placed    PLAN:   Neuro/ pain/ sedation:  #Acute Postoperative pain  #Depression   #Anxiety due to a medical condition  #Insomnia  #Delerium   Delirious intermittently.   - Monitor neurological status. Notify the MD for any acute changes in exam.  - Pain:    -Scheduled tylenol, scheduled oxycodone 10mg q4h.    -PRN dilaudid  - Depression: Duloxetine 30mg  - Sleep: Melatonin 10 mg HS, Seroquel 100 mg HS  - Anxiety: Valproate po 250 mg BID and 500mg Bedtime   - Hydroxyzine PRN  - PTA meds: hydroxyzine 25mg PRN, zolpidem 5mg, melatonin 3mg, lorazepam 0.5mg  - QTc(7/18)- 683 msec, no haldol     Pulmonary:   #Acute hypoxic respiratory failure on iMV  #Mild-moderate emphysema  #History of COVID-19  Off nitric 7/12, extubated  7/20 to HFNC.  - Wean supplemental O2 as able  - Maintain SpO2> 92%  - Incentive spirometry     Cardiovascular:    #S/p LVAD placement (HeartMate 3) on 7/8/22   #S/p RVAD (29F Protek duo RIJ) on 7/8/22  #S/p chest closure 7/11  #Cardiogenic shock  #Advanced ischemic cardiomyopathy, class IV, stage D  #CAD s/p PCI to LAD (2005)  #S/p CRT-D (2006)  #History of MI (2007)  #Severe MR  #Antiphospholipid antibody syndrome on chronic warfarin  #HTN, HLD  #Recent hospitalization 5/16-5/26 s/p RCA, LAD stenting  #Atrial fibrillation   # RV mobile clot   - Monitor hemodynamic status. Chest closure and oxygenator splicing 7/11. Reopened 7/12 for I&D and left open, closed 7/13. RVAD removed 7/21.  - Goal MAP > 65   - PTA meds(held): clopidogrel 75mg, lasix 40mg, warfarin 5mg  - LVAD management per Advanced HF service  - AC Plan: High intensity Heparin for RV mobile thrombus   - Aspirin 81 mg  - Atorvastatin 40mg  - Hydralazine uriel and prn MAP >85 discontinued  - Captopril 12.5mg TID dose titration per cardiology   - Warfarin 7/24 per pharmacy       GI / Nutrition:   #GERD  #Congestive hepatopathy in the setting of cardiac insuffiencey -resolved  #Hematemesis / oral mucosal bleeding -resolved    - Full liquid diet, SLP following  - Continue NJT at goal   - Nutrition following  - Bowel regimen prn (miralax / senna)  - Trend LFT's every other day    Renal/ Fluid Balance:    - Strict I/O, daily weights   - Avoid/limit nephrotoxins as able  - Replete lytes PRN per protocol  - PTA meds: tamsulosin 0.4mg (held)  - Goal net negative ~ 1L   Bumex gtt for part of 7/24: total UOP 8L, gtt stopped   Bumex 4mg x1 7/25; monitor response    Endocrine:    #Stress induced hyperglycemia  Preop A1c 5.5%  - Insulin glargine 20U and high sliding scale  - Goal BG <180 for optimal healing     ID/ Antibiotics:  #Periopearive antibiosis (s/p course of Cefepime, vancomycin, rifampin, fluconazole)  #HAP - Enterococcus faecalis PNA, s/p treatment   - 7/12  Pan culture: Bcx x2, UA/UC -> negative    - 7/14 Endotracheal sputum culture (4+ candida albicans, 1+ E.Coli, 3+ enterococcus faecalis)   - Zosyn for HAP(7/15-7/22)  - 7/18 C.diff- negative    Heme:     #Acute blood loss anemia  #Acute blood loss thrombocytopenia  #History of DVT and PE   #Antiphospholipid antibody syndrome  #History of iron deficiency anemia  - Monitor for s/sx of bleeding  - Trend CBC and coags  - Hgb goal >8   Hgb 9.1 AM 7/25 (s/p 1u PRBCs for Hgb 7.2)  - Plt goal > 20    Rheumatology:  #SLE  - Chronic: symptoms include arthritis, photosensitivity, fatigue, and skin manifestations  - PTA meds: hydroxychloroquine 200mg, resumed 7/16  - Rheumatology consulted and following. Follow dsDNA and complement levels(7/18)    -Will need cellcept restarted when appropriate.    Prophylaxis:    - DVT: SCD + high intensity heparin + warfarin   - PPI    Lines / Tubes / Drains:  - R triple lumen PICC (6/17- )  - Left Axillary A-line (7/18- ) - remove  - Fitzgerald (7/8- )  - NJT (7/11- )  - Chest Tubes (7/8- ) - remove  - Rectal tube (7/15- ) - remove      Disposition:  - CVICU  - OK for transfer to floor     Patient seen, findings and plan discussed with CV ICU staff.    Medhat Rosenthal MD  Surgery PGY-3    ----------------------------------------------------------------    Interval Events:  No acute events. 1u PRBCs for Hgb 7.2 with appropriate response.     OBJECTIVE:   1. VITAL SIGNS:   Temp:  [97.5  F (36.4  C)-98.7  F (37.1  C)] 98.7  F (37.1  C)  Pulse:  [100-116] 109  Resp:  [16-38] 27  MAP:  [45 mmHg-102 mmHg] 94 mmHg  Arterial Line BP: ()/(40-91) 103/84  FiO2 (%):  [30 %-50 %] 50 %  SpO2:  [85 %-100 %] 100 %  FiO2 (%): 50 %  Resp: 27      2. INTAKE/ OUTPUT:   I/O last 3 completed shifts:  In: 2830.88 [P.O.:510; I.V.:630.88; NG/GT:490]  Out: 8705 [Urine:8095; Stool:500; Chest Tube:110]    3. PHYSICAL EXAMINATION:   General: sitting in chair, comfortable  Neuro: follow commands, ongoing intermittent  agitation/delerium  Resp: on HFNC, weaning FiO2 support  CV: tachycardic  Abdomen: Soft, mildly distended, Non-tender  Incisions: c/d/i  Extremities: warm and well perfused    4. INVESTIGATIONS:   Arterial Blood Gases   Recent Labs   Lab 07/23/22  1419 07/23/22  1224 07/23/22  1042 07/22/22  1309   PH 7.47* 7.40 7.49* 7.49*   PCO2 39 43 38 36   PO2 178* 137* 113* 95   HCO3 29* 27 29* 27     Complete Blood Count   Recent Labs   Lab 07/25/22  0329 07/24/22 2156 07/24/22  1639 07/24/22  1240   WBC 12.2* 10.2 13.8* 15.3*   HGB 9.1* 7.2* 8.1* 8.3*    372 412 440     Basic Metabolic Panel  Recent Labs   Lab 07/25/22  0333 07/25/22  0329 07/24/22  2322 07/24/22  2156 07/24/22  1646 07/24/22  1639 07/24/22  1244 07/24/22  0953   NA  --  137  --  135*  --  135*  --  135*   POTASSIUM  --  4.2  --  3.6  --  3.4  --  3.9   CHLORIDE  --  95*  --  93*  --  92*  --  97*   CO2  --  33*  --  34*  --  34*  --  29   BUN  --  19.7  --  19.6  --  18.1  --  19.0   CR  --  0.60*  --  0.62*  --  0.61*  --  0.58*   * 116* 125* 114*   < > 125*   < > 120*    < > = values in this interval not displayed.     Liver Function Tests  Recent Labs   Lab 07/25/22  0542 07/25/22  0329 07/24/22 2156 07/24/22  1639 07/24/22  0953 07/24/22  0405 07/23/22  1037 07/23/22  0603 07/22/22  0457 07/21/22  2149 07/21/22  1018 07/21/22  0331   AST 27  --   --   --   --   --   --  28  --  27  --  32   ALT 12  --   --   --   --   --   --  12  --  11  --  12   ALKPHOS 203*  --   --   --   --   --   --  199*  --  155*  --  145*   BILITOTAL 0.6  --   --   --   --   --   --  0.6  --  0.7  --  0.8   ALBUMIN 2.9* 2.9*  --   --   --  2.9*  --  3.0*   < > 2.7*  --  2.7*   INR  --  1.20* 1.26* 1.19* 1.16* 1.22*   < >  --    < > 1.29*   < > 1.34*    < > = values in this interval not displayed.     Pancreatic Enzymes  No lab results found in last 7 days.  Coagulation Profile  Recent Labs   Lab 07/25/22  0329 07/24/22  2156 07/24/22  1639 07/24/22  0953  07/24/22  0405 07/23/22  2124 07/23/22  1617   INR 1.20* 1.26* 1.19* 1.16* 1.22* 1.23* 1.19*   PTT  --   --   --  100* >240* 105* 74*         5. RADIOLOGY:   Recent Results (from the past 24 hour(s))   XR Chest Port 1 View    Impression    RESIDENT PRELIMINARY INTERPRETATION  Impression:   1.  Stable cardiomegaly with unchanged bilateral perihilar opacities  suggestive for pulmonary edema.  2.  Small bilateral pleural effusions are unchanged.  3.  Stable support devices as above.        =========================================

## 2022-07-25 NOTE — PROGRESS NOTES
CLINICAL NUTRITION SERVICES - REASSESSMENT NOTE   Nutrition Prescription    Malnutrition Status:    Severe malnutrition in the context of acute on chronic illness    Recommendations already ordered by Registered Dietitian (RD):  Continue EN support as ordered:  Osmolite 1.5 Virgilio @ goal of 50ml/hr (1200ml/day) + 1 pkt Prosource TID will provide: 1920 kcals (30 kcal/kg), 108 g PRO (1.7 g/kg), 914 ml free H20, 244 g CHO, and 0 g fiber daily per new dosing wt of 63 kg.     Supplements for pt to trial:   Ensure Enlive @ 10am (vary flavors)  Cherry Gelatein @ 2pm  Magic Cup @ 8pm (vary flavors)    Future/Additional Recommendations:  -Monitor tolerance of supplements and change per pt preference.  -Monitor appropriateness to start kcal cts (pt eating minimally so far)  -Monitor appropriateness for cycled feeds - could trial half-feeds overnight for a few days and run kcal cts. **Pt on Lantus.     EVALUATION OF THE PROGRESS TOWARD GOALS   Diet: Regular diet per SLP 7/25, 7/26    Oral Intake: Last meal intake charted on 7/7 per RN flowsheets. Pt says he's been eating mostly jello and pudding (empty jello container noted in garbage when writer tossed bridle packaging). Seems pleasantly confused, some nonsensical statements.     Enteral Access: NJT    FWF: 30 mL q4h    Nutrition Support: EN  7/11 - Current: Osmolite 1.5 Virgilio @ goal of 50ml/hr (1200ml/day) + 1 pkt Prosource TID will provide: 1920 kcals (29 kcal/kg), 108 g PRO (1.6 g/kg), 914 ml free H20, 244 g CHO, and 0 g fiber daily     Enteral Intake: Tolerating TF at goal rate.   -->7-day average enteral nutrition infusions: 1179 mL TF + 3 ProSource protein packets = 1889 kcals (28 kcal/kg, or 100% minimum assessed kcal needs) and 107 g protein (1.6 g protein/kg, or 100% minimum assessed protein needs).     NEW FINDINGS   -Wt trends: Pt remains above lowest wt this admit - writer somewhat questions accuracy of bed wts as pt's body habitus appears more in-line with the  majority of standing scale readings in the low to mid 60 kgs.  07/26/22 0000 78.9 kg (173 lb 15.1 oz) Bed scale   07/24/22 1300 78.7 kg (173 lb 8 oz) Bed scale   07/22/22 1600 78.9 kg (173 lb 15.1 oz) Bed scale   07/20/22 0300 83 kg (182 lb 15.7 oz) Bed scale   07/19/22 0000 81.3 kg (179 lb 3.7 oz) Bed scale   07/18/22 0400 82.5 kg (181 lb 14.1 oz) --   07/17/22 0600 81.5 kg (179 lb 10.8 oz) --   07/16/22 0600 80.8 kg (178 lb 2.1 oz) --   07/14/22 0400 79.9 kg (176 lb 2.4 oz) Bed scale   07/13/22 0515 79.7 kg (175 lb 11.3 oz) Bed scale   07/12/22 0430 76.1 kg (167 lb 12.3 oz) Bed scale   07/11/22 0200 77.8 kg (171 lb 8.3 oz) Bed scale   07/10/22 0400 77.8 kg (171 lb 8.3 oz) --   07/08/22 0400 63 kg (138 lb 14.2 oz) Standing scale   07/07/22 0000 63 kg (138 lb 14.2 oz) Standing scale   07/06/22 0600 60.5 kg (133 lb 6.1 oz) Bed scale   07/05/22 0400 65.3 kg (143 lb 15.4 oz) Bed scale   07/04/22 0500 66.1 kg (145 lb 11.6 oz) Bed scale   07/03/22 0000 66.3 kg (146 lb 3.2 oz) Standing scale   07/01/22 0000 66 kg (145 lb 6.4 oz) --   06/30/22 0800 66.1 kg (145 lb 11.2 oz) Standing scale   06/29/22 0900 66 kg (145 lb 8 oz) Standing scale   06/28/22 0900 66.5 kg (146 lb 9.7 oz) Standing scale   06/27/22 0839 66.1 kg (145 lb 11.2 oz) Standing scale   06/27/22 0400 -- Bed scale   06/27/22 0000 64.8 kg (142 lb 12.8 oz) Bed scale   06/26/22 1000 65 kg (143 lb 6.4 oz) Standing scale   06/25/22 0400 64.8 kg (142 lb 13.7 oz) Bed scale   06/24/22 0400 67.6 kg (149 lb 0.5 oz) Bed scale   06/23/22 0600 66.3 kg (146 lb 2.6 oz) Bed scale   06/23/22 0400 -- Bed scale   06/21/22 0400 66.8 kg (147 lb 4.3 oz) Bed scale   06/20/22 0000 67.9 kg (149 lb 11.1 oz) --   06/18/22 0400 68.5 kg (151 lb 0.2 oz) Bed scale   06/17/22 1736 67.4 kg (148 lb 9.4 oz) Bed scale     -Labs: Reviewed, notable for: Na+ 135 (L), Cr 0.65 (L, indicative of low LBM)    -Cardio: LVAD    -GI: Rectal tube with 200-950 mL output daily over past week per I/Os. On 1 pkt  NS fiber TID.      -Renal: Bumex    -Respiratory: Extubated 7/20; RVAD removed 7/21    -Skin: Writer replaced bridle as previous one came undone (string snapped in half per RN). No skin breakdown noted and writer replaced bridle. Per most recent WOCN note on 7/20, pt with evolving DTI on R temporal area.    -Meds & Vitamin/Mineral Supplementation: Reviewed, notable for: High dose sliding scale insulin, Lantus 20 units qAM, KCl    NEW Dosing Weight: 63 kg (actual, based on most recent low wt of 63 kg on 7/8 taken by standing scale) - BMI 21.7 kg/m2    UPDATED ASSESSED NUTRITION NEEDS   Estimated Energy Needs: 9886-5057 kcals/day (25 - 30 kcals/kg)   Justification: Maintenance   Estimated Protein Needs:  grams protein/day (1.5 - 2 grams of pro/kg)   Justification: post-LVAD needs   Estimated Fluid Needs: 1 mL/kcal  Justification: Maintenance or per primary team pending fluid status    MALNUTRITION  % Intake: Decreased intake does not meet criteria  % Weight Loss: None noted/difficult to assess with questionable accuracy of wts  Subcutaneous Fat Loss: Facial region:  Mild, Upper arm:  Mild, Lower arm:  Mild and Thoracic/intercostal:  Mild  Muscle Loss: Temporal:  Moderate, Facial & jaw region:  Moderate, Thoracic region (clavicle, acromium bone, deltoid, trapezius, pectoral):  Moderate, Upper arm (bicep, tricep):  Moderate, Lower arm  (forearm):  Mild, Upper leg (quadricep, hamstring):  Mild, Patellar region:  Mild and Posterior calf:  Mild  Fluid Accumulation/Edema: Mild (2+ edema per RN flowsheets)  Malnutrition Diagnosis: Severe malnutrition in the context of acute on chronic illness -- changing to severe given multiple areas of moderate muscle loss and overall malnourished appearance    Previous Goals   Total avg nutritional intake to meet a minimum of 25 kcal/kg and 1.5 g PRO/kg daily (per dosing wt 67 kg).  Evaluation: Met    Previous Nutrition Diagnosis  Inadequate oral intake related to intubation as  evidenced by NPO status and need for EN to meet 100% nutrition needs  Evaluation: Ongoing, but now extubated    CURRENT NUTRITION DIAGNOSIS  Inadequate oral intake related to dysphagia, AMS at times as evidenced by pt eating minimally and continues to be reliant on EN support for full nutrition needs at this time.     INTERVENTIONS  Implementation  -Collaboration with other providers - Rounds   -Enteral Nutrition - Continue  -Supplements: Sending trial of various supplements    Goals  Total avg nutritional intake to meet a minimum of 25 kcal/kg and 1.5g PRO/kg daily (per NEW dosing wt 63 kg).    Monitoring/Evaluation  Progress toward goals will be monitored and evaluated per protocol.     Erica Maguire RD, LD  Pager: [phone]

## 2022-07-26 ENCOUNTER — APPOINTMENT (OUTPATIENT)
Dept: OCCUPATIONAL THERAPY | Facility: CLINIC | Age: 62
DRG: 001 | End: 2022-07-26
Attending: INTERNAL MEDICINE
Payer: COMMERCIAL

## 2022-07-26 ENCOUNTER — APPOINTMENT (OUTPATIENT)
Dept: SPEECH THERAPY | Facility: CLINIC | Age: 62
DRG: 001 | End: 2022-07-26
Attending: INTERNAL MEDICINE
Payer: COMMERCIAL

## 2022-07-26 ENCOUNTER — APPOINTMENT (OUTPATIENT)
Dept: PHYSICAL THERAPY | Facility: CLINIC | Age: 62
DRG: 001 | End: 2022-07-26
Attending: INTERNAL MEDICINE
Payer: COMMERCIAL

## 2022-07-26 LAB
ALBUMIN SERPL BCG-MCNC: 2.8 G/DL (ref 3.5–5.2)
ALBUMIN SERPL BCG-MCNC: 2.8 G/DL (ref 3.5–5.2)
ALP SERPL-CCNC: 220 U/L (ref 40–129)
ALT SERPL W P-5'-P-CCNC: 13 U/L (ref 10–50)
ANION GAP SERPL CALCULATED.3IONS-SCNC: 10 MMOL/L (ref 7–15)
ANION GAP SERPL CALCULATED.3IONS-SCNC: 6 MMOL/L (ref 7–15)
AST SERPL W P-5'-P-CCNC: 33 U/L (ref 10–50)
AT III ACT/NOR PPP CHRO: 93 % (ref 85–135)
BACTERIA BLD CULT: NO GROWTH
BASOPHILS # BLD AUTO: 0 10E3/UL (ref 0–0.2)
BASOPHILS NFR BLD AUTO: 0 %
BILIRUB DIRECT SERPL-MCNC: 0.31 MG/DL (ref 0–0.3)
BILIRUB SERPL-MCNC: 0.6 MG/DL
BUN SERPL-MCNC: 16.3 MG/DL (ref 8–23)
BUN SERPL-MCNC: 17.9 MG/DL (ref 8–23)
CA-I BLD-MCNC: 4.2 MG/DL (ref 4.4–5.2)
CA-I BLD-MCNC: 4.2 MG/DL (ref 4.4–5.2)
CA-I BLD-MCNC: 4.5 MG/DL (ref 4.4–5.2)
CALCIUM SERPL-MCNC: 8.2 MG/DL (ref 8.8–10.2)
CALCIUM SERPL-MCNC: 8.8 MG/DL (ref 8.8–10.2)
CHLORIDE SERPL-SCNC: 94 MMOL/L (ref 98–107)
CHLORIDE SERPL-SCNC: 95 MMOL/L (ref 98–107)
CREAT SERPL-MCNC: 0.65 MG/DL (ref 0.67–1.17)
CREAT SERPL-MCNC: 0.73 MG/DL (ref 0.67–1.17)
D DIMER PPP FEU-MCNC: 6.08 UG/ML FEU (ref 0–0.5)
D DIMER PPP FEU-MCNC: 7.49 UG/ML FEU (ref 0–0.5)
DEPRECATED HCO3 PLAS-SCNC: 31 MMOL/L (ref 22–29)
DEPRECATED HCO3 PLAS-SCNC: 33 MMOL/L (ref 22–29)
EOSINOPHIL # BLD AUTO: 0.2 10E3/UL (ref 0–0.7)
EOSINOPHIL NFR BLD AUTO: 1 %
ERYTHROCYTE [DISTWIDTH] IN BLOOD BY AUTOMATED COUNT: 18.7 % (ref 10–15)
FIBRINOGEN PPP-MCNC: 472 MG/DL (ref 170–490)
FIBRINOGEN PPP-MCNC: 475 MG/DL (ref 170–490)
GFR SERPL CREATININE-BSD FRML MDRD: >90 ML/MIN/1.73M2
GFR SERPL CREATININE-BSD FRML MDRD: >90 ML/MIN/1.73M2
GLUCOSE BLDC GLUCOMTR-MCNC: 100 MG/DL (ref 70–99)
GLUCOSE BLDC GLUCOMTR-MCNC: 117 MG/DL (ref 70–99)
GLUCOSE BLDC GLUCOMTR-MCNC: 122 MG/DL (ref 70–99)
GLUCOSE BLDC GLUCOMTR-MCNC: 128 MG/DL (ref 70–99)
GLUCOSE BLDC GLUCOMTR-MCNC: 222 MG/DL (ref 70–99)
GLUCOSE BLDC GLUCOMTR-MCNC: 84 MG/DL (ref 70–99)
GLUCOSE SERPL-MCNC: 129 MG/DL (ref 70–99)
GLUCOSE SERPL-MCNC: 84 MG/DL (ref 70–99)
HCT VFR BLD AUTO: 27.6 % (ref 40–53)
HGB BLD-MCNC: 8.6 G/DL (ref 13.3–17.7)
HGB FREE PLAS-MCNC: 40 MG/DL
IMM GRANULOCYTES # BLD: 0.3 10E3/UL
IMM GRANULOCYTES NFR BLD: 2 %
INR PPP: 1.31 (ref 0.85–1.15)
LACTATE SERPL-SCNC: 1.7 MMOL/L (ref 0.7–2)
LYMPHOCYTES # BLD AUTO: 0.6 10E3/UL (ref 0.8–5.3)
LYMPHOCYTES NFR BLD AUTO: 4 %
MAGNESIUM SERPL-MCNC: 2.1 MG/DL (ref 1.7–2.3)
MCH RBC QN AUTO: 29 PG (ref 26.5–33)
MCHC RBC AUTO-ENTMCNC: 31.2 G/DL (ref 31.5–36.5)
MCV RBC AUTO: 93 FL (ref 78–100)
MONOCYTES # BLD AUTO: 0.6 10E3/UL (ref 0–1.3)
MONOCYTES NFR BLD AUTO: 4 %
NEUTROPHILS # BLD AUTO: 14.2 10E3/UL (ref 1.6–8.3)
NEUTROPHILS NFR BLD AUTO: 89 %
NRBC # BLD AUTO: 0 10E3/UL
NRBC BLD AUTO-RTO: 0 /100
PHOSPHATE SERPL-MCNC: 3.8 MG/DL (ref 2.5–4.5)
PLATELET # BLD AUTO: 372 10E3/UL (ref 150–450)
POTASSIUM SERPL-SCNC: 4.3 MMOL/L (ref 3.4–5.3)
POTASSIUM SERPL-SCNC: 4.5 MMOL/L (ref 3.4–5.3)
PROT SERPL-MCNC: 6.1 G/DL (ref 6.4–8.3)
RBC # BLD AUTO: 2.97 10E6/UL (ref 4.4–5.9)
SODIUM SERPL-SCNC: 134 MMOL/L (ref 136–145)
SODIUM SERPL-SCNC: 135 MMOL/L (ref 136–145)
UFH PPP CHRO-ACNC: 0.55 IU/ML
WBC # BLD AUTO: 15.9 10E3/UL (ref 4–11)

## 2022-07-26 PROCEDURE — 250N000011 HC RX IP 250 OP 636: Performed by: STUDENT IN AN ORGANIZED HEALTH CARE EDUCATION/TRAINING PROGRAM

## 2022-07-26 PROCEDURE — 250N000013 HC RX MED GY IP 250 OP 250 PS 637: Performed by: SURGERY

## 2022-07-26 PROCEDURE — 82330 ASSAY OF CALCIUM: CPT | Performed by: SURGERY

## 2022-07-26 PROCEDURE — 97535 SELF CARE MNGMENT TRAINING: CPT | Mod: GO | Performed by: OCCUPATIONAL THERAPIST

## 2022-07-26 PROCEDURE — 250N000013 HC RX MED GY IP 250 OP 250 PS 637: Performed by: INTERNAL MEDICINE

## 2022-07-26 PROCEDURE — 85384 FIBRINOGEN ACTIVITY: CPT | Performed by: SURGERY

## 2022-07-26 PROCEDURE — 97110 THERAPEUTIC EXERCISES: CPT | Mod: GO | Performed by: OCCUPATIONAL THERAPIST

## 2022-07-26 PROCEDURE — 97110 THERAPEUTIC EXERCISES: CPT | Mod: GP

## 2022-07-26 PROCEDURE — 80053 COMPREHEN METABOLIC PANEL: CPT | Performed by: SURGERY

## 2022-07-26 PROCEDURE — 999N000155 HC STATISTIC RAPCV CVP MONITORING

## 2022-07-26 PROCEDURE — 85379 FIBRIN DEGRADATION QUANT: CPT | Performed by: SURGERY

## 2022-07-26 PROCEDURE — 97530 THERAPEUTIC ACTIVITIES: CPT | Mod: GO | Performed by: OCCUPATIONAL THERAPIST

## 2022-07-26 PROCEDURE — 85610 PROTHROMBIN TIME: CPT | Performed by: STUDENT IN AN ORGANIZED HEALTH CARE EDUCATION/TRAINING PROGRAM

## 2022-07-26 PROCEDURE — 84100 ASSAY OF PHOSPHORUS: CPT | Performed by: SURGERY

## 2022-07-26 PROCEDURE — 85520 HEPARIN ASSAY: CPT | Performed by: THORACIC SURGERY (CARDIOTHORACIC VASCULAR SURGERY)

## 2022-07-26 PROCEDURE — 83605 ASSAY OF LACTIC ACID: CPT | Performed by: STUDENT IN AN ORGANIZED HEALTH CARE EDUCATION/TRAINING PROGRAM

## 2022-07-26 PROCEDURE — 250N000013 HC RX MED GY IP 250 OP 250 PS 637: Performed by: ANESTHESIOLOGY

## 2022-07-26 PROCEDURE — 250N000013 HC RX MED GY IP 250 OP 250 PS 637: Performed by: STUDENT IN AN ORGANIZED HEALTH CARE EDUCATION/TRAINING PROGRAM

## 2022-07-26 PROCEDURE — 85300 ANTITHROMBIN III ACTIVITY: CPT | Performed by: SURGERY

## 2022-07-26 PROCEDURE — 250N000013 HC RX MED GY IP 250 OP 250 PS 637: Performed by: THORACIC SURGERY (CARDIOTHORACIC VASCULAR SURGERY)

## 2022-07-26 PROCEDURE — 92526 ORAL FUNCTION THERAPY: CPT | Mod: GN

## 2022-07-26 PROCEDURE — 83051 HEMOGLOBIN PLASMA: CPT | Performed by: SURGERY

## 2022-07-26 PROCEDURE — 85025 COMPLETE CBC W/AUTO DIFF WBC: CPT | Performed by: STUDENT IN AN ORGANIZED HEALTH CARE EDUCATION/TRAINING PROGRAM

## 2022-07-26 PROCEDURE — 200N000002 HC R&B ICU UMMC

## 2022-07-26 PROCEDURE — 99291 CRITICAL CARE FIRST HOUR: CPT | Mod: 25 | Performed by: INTERNAL MEDICINE

## 2022-07-26 PROCEDURE — 97530 THERAPEUTIC ACTIVITIES: CPT | Mod: GP

## 2022-07-26 PROCEDURE — 250N000011 HC RX IP 250 OP 636: Performed by: SURGERY

## 2022-07-26 PROCEDURE — 82310 ASSAY OF CALCIUM: CPT | Performed by: STUDENT IN AN ORGANIZED HEALTH CARE EDUCATION/TRAINING PROGRAM

## 2022-07-26 PROCEDURE — 82248 BILIRUBIN DIRECT: CPT | Performed by: STUDENT IN AN ORGANIZED HEALTH CARE EDUCATION/TRAINING PROGRAM

## 2022-07-26 PROCEDURE — 83735 ASSAY OF MAGNESIUM: CPT | Performed by: SURGERY

## 2022-07-26 PROCEDURE — 93750 INTERROGATION VAD IN PERSON: CPT | Performed by: INTERNAL MEDICINE

## 2022-07-26 RX ORDER — WARFARIN SODIUM 7.5 MG/1
7.5 TABLET ORAL
Status: COMPLETED | OUTPATIENT
Start: 2022-07-26 | End: 2022-07-26

## 2022-07-26 RX ORDER — TRAZODONE HYDROCHLORIDE 50 MG/1
50 TABLET, FILM COATED ORAL AT BEDTIME
Status: DISCONTINUED | OUTPATIENT
Start: 2022-07-26 | End: 2022-08-02

## 2022-07-26 RX ORDER — CAPTOPRIL 12.5 MG/1
12.5 TABLET ORAL 3 TIMES DAILY
Status: DISCONTINUED | OUTPATIENT
Start: 2022-07-26 | End: 2022-07-26

## 2022-07-26 RX ORDER — OLANZAPINE 5 MG/1
5 TABLET ORAL 2 TIMES DAILY
Status: DISCONTINUED | OUTPATIENT
Start: 2022-07-26 | End: 2022-08-10

## 2022-07-26 RX ORDER — OXYCODONE HYDROCHLORIDE 5 MG/1
5 TABLET ORAL EVERY 6 HOURS
Status: DISCONTINUED | OUTPATIENT
Start: 2022-07-26 | End: 2022-07-27

## 2022-07-26 RX ORDER — BUMETANIDE 0.25 MG/ML
2 INJECTION INTRAMUSCULAR; INTRAVENOUS ONCE
Status: COMPLETED | OUTPATIENT
Start: 2022-07-26 | End: 2022-07-26

## 2022-07-26 RX ORDER — DIGOXIN 125 MCG
125 TABLET ORAL DAILY
Status: DISCONTINUED | OUTPATIENT
Start: 2022-07-26 | End: 2022-08-21 | Stop reason: HOSPADM

## 2022-07-26 RX ADMIN — ACETAMINOPHEN 975 MG: 325 TABLET, FILM COATED ORAL at 06:02

## 2022-07-26 RX ADMIN — Medication 1 PACKET: at 19:50

## 2022-07-26 RX ADMIN — Medication 10 MG: at 19:49

## 2022-07-26 RX ADMIN — OXYCODONE HYDROCHLORIDE 5 MG: 5 TABLET ORAL at 21:18

## 2022-07-26 RX ADMIN — ATORVASTATIN CALCIUM 40 MG: 40 TABLET, FILM COATED ORAL at 19:49

## 2022-07-26 RX ADMIN — HYDROXYCHLOROQUINE SULFATE 200 MG: 200 TABLET, FILM COATED ORAL at 07:40

## 2022-07-26 RX ADMIN — TRAZODONE HYDROCHLORIDE 50 MG: 50 TABLET ORAL at 21:18

## 2022-07-26 RX ADMIN — OXYCODONE HYDROCHLORIDE 10 MG: 5 TABLET ORAL at 03:26

## 2022-07-26 RX ADMIN — PIPERACILLIN AND TAZOBACTAM 4.5 G: 4; .5 INJECTION, POWDER, FOR SOLUTION INTRAVENOUS at 03:59

## 2022-07-26 RX ADMIN — DIGOXIN 125 MCG: 125 TABLET ORAL at 17:40

## 2022-07-26 RX ADMIN — BUMETANIDE 2 MG: 0.25 INJECTION INTRAMUSCULAR; INTRAVENOUS at 17:40

## 2022-07-26 RX ADMIN — Medication 6.25 MG: at 19:51

## 2022-07-26 RX ADMIN — CALCIUM GLUCONATE 1 G: 20 INJECTION, SOLUTION INTRAVENOUS at 19:49

## 2022-07-26 RX ADMIN — Medication 1 PACKET: at 07:43

## 2022-07-26 RX ADMIN — VALPROIC ACID 250 MG: 250 SOLUTION ORAL at 07:41

## 2022-07-26 RX ADMIN — ACETAMINOPHEN 975 MG: 325 TABLET, FILM COATED ORAL at 13:04

## 2022-07-26 RX ADMIN — ASPIRIN 81 MG CHEWABLE TABLET 81 MG: 81 TABLET CHEWABLE at 07:40

## 2022-07-26 RX ADMIN — ACETAMINOPHEN 975 MG: 325 TABLET, FILM COATED ORAL at 21:18

## 2022-07-26 RX ADMIN — Medication 1 PACKET: at 13:04

## 2022-07-26 RX ADMIN — HEPARIN SODIUM AND DEXTROSE 2050 UNITS/HR: 10000; 5 INJECTION INTRAVENOUS at 18:10

## 2022-07-26 RX ADMIN — HYDROXYCHLOROQUINE SULFATE 200 MG: 200 TABLET, FILM COATED ORAL at 19:49

## 2022-07-26 RX ADMIN — OLANZAPINE 5 MG: 5 TABLET, FILM COATED ORAL at 19:49

## 2022-07-26 RX ADMIN — OXYCODONE HYDROCHLORIDE 5 MG: 5 TABLET ORAL at 10:01

## 2022-07-26 RX ADMIN — LIDOCAINE PATCH 4% 1 PATCH: 40 PATCH TOPICAL at 19:48

## 2022-07-26 RX ADMIN — ONDANSETRON 4 MG: 2 INJECTION INTRAMUSCULAR; INTRAVENOUS at 07:55

## 2022-07-26 RX ADMIN — Medication 12.5 MG: at 13:04

## 2022-07-26 RX ADMIN — FLUTICASONE PROPIONATE AND SALMETEROL XINAFOATE 2 PUFF: 115; 21 AEROSOL, METERED RESPIRATORY (INHALATION) at 07:42

## 2022-07-26 RX ADMIN — INSULIN GLARGINE 20 UNITS: 100 INJECTION, SOLUTION SUBCUTANEOUS at 07:42

## 2022-07-26 RX ADMIN — WARFARIN SODIUM 7.5 MG: 7.5 TABLET ORAL at 17:41

## 2022-07-26 RX ADMIN — OXYCODONE HYDROCHLORIDE 5 MG: 5 TABLET ORAL at 15:47

## 2022-07-26 RX ADMIN — OLANZAPINE 5 MG: 5 TABLET, FILM COATED ORAL at 10:01

## 2022-07-26 RX ADMIN — Medication 40 MG: at 07:40

## 2022-07-26 RX ADMIN — DULOXETINE 30 MG: 30 CAPSULE, DELAYED RELEASE ORAL at 07:40

## 2022-07-26 RX ADMIN — Medication 1 PACKET: at 19:52

## 2022-07-26 RX ADMIN — FLUTICASONE PROPIONATE AND SALMETEROL XINAFOATE 2 PUFF: 115; 21 AEROSOL, METERED RESPIRATORY (INHALATION) at 19:51

## 2022-07-26 RX ADMIN — MULTIVITAMIN 15 ML: LIQUID ORAL at 07:42

## 2022-07-26 RX ADMIN — BUMETANIDE 4 MG: 0.25 INJECTION, SOLUTION INTRAMUSCULAR; INTRAVENOUS at 07:44

## 2022-07-26 RX ADMIN — HYDROXYZINE HYDROCHLORIDE 50 MG: 25 TABLET ORAL at 02:59

## 2022-07-26 RX ADMIN — PIPERACILLIN AND TAZOBACTAM 4.5 G: 4; .5 INJECTION, POWDER, FOR SOLUTION INTRAVENOUS at 08:31

## 2022-07-26 RX ADMIN — HEPARIN SODIUM AND DEXTROSE 2050 UNITS/HR: 10000; 5 INJECTION INTRAVENOUS at 05:21

## 2022-07-26 ASSESSMENT — ACTIVITIES OF DAILY LIVING (ADL)
ADLS_ACUITY_SCORE: 34
ADLS_ACUITY_SCORE: 34
ADLS_ACUITY_SCORE: 36
ADLS_ACUITY_SCORE: 34
ADLS_ACUITY_SCORE: 36

## 2022-07-26 NOTE — PLAN OF CARE
Goal Outcome Evaluation:    Plan of Care Reviewed With: patient     Overall Patient Progress: no change    Outcome Evaluation: Pt eating minimally, although diet just advanced yesterday by SLP to Regular. Pt reports he has an appetite, but is pleasantly confused and making nonsensical remarks at times during writer's visit. Will continue full-nutrition TF at this time. Writer replaced bridle on FT after previous came undone/broke, marking unchanged per bedside RN.

## 2022-07-26 NOTE — PROGRESS NOTES
CV ICU PROGRESS NOTE  07/26/2022        CO-MORBIDITIES  1. Cardiogenic shock (H)  (primary encounter diagnosis)  2. Ischemic cardiomyopathy  3. Heart failure with reduced ejection fraction, NYHA class III (H)  4. Coronary artery disease involving native coronary artery of native heart without angina pectoris      ASSESSMENT Dandy Sands is a 61 year old male with a PMH of CAD s/p PCI to LAD, MI, ICM, acute on chronic heart failure, s/p CRT-D, severe MR, antiphospholipid antibody syndrome on chronic warfarin, SLE, HTN, HLD, recent hospitalization 5/16-5/26 s/p RCA, LAD stenting who underwent RVAD (29F Protek duo RIJ) LVAD placement (HeartMate 3) on 7/8/22 by Dr. Zamora. Intraoperative course complicated by IABP dysfunction and RV failure, requiring crash onto bypass / vasopressor support, NO, and protek placement. Now s/p chest closure and NO wean 7/11. Return to OR for hemopericardium (7/12) and chest re-closure 7/13. Protek RVAD removed 7/21.     Today's Progress:  Neuro: intermittently delirious, no changes, pain regimen approriate  Resp: weaned to nasal cannula. CXR w/o PTX (concern given hypotension)  CV: hypotension (likely related to diuresis) requiring high-dose epi, levo. Epi 0.01 (wean off today). Got 1L LR, lactate normal (1.7).   GI: no changes. Eating, stooling.  Renal: diurese less today given hypotension overnight and aggressive diuresis in past days  Endo: Glargine + sliding scale  ID: blood cultures from overnight pending given hypotension, concern for sepsis. Stop antibiotics since hypotension resolved.  Heme: Hgb stable, on Warfarin and still on hep gtt for RV thrombus  Dispo: CVICU for the day    PLAN:   Neuro/ pain/ sedation:  #Acute Postoperative pain  #Depression   #Anxiety due to a medical condition  #Insomnia  #Delerium   Delirious intermittently.   - Monitor neurological status. Notify the MD for any acute changes in exam.  - Pain:    -Scheduled tylenol   -PRN dilaudid, oxycodone 5mg  q6h  - Depression: Duloxetine 30mg  - Sleep: Melatonin 10 mg HS,    - discontinue Seroquel 100 mg HS   - start trazodone    - Zyprexa 5mg BID  - Anxiety: Valproate po 250 mg BID and 500mg Bedtime   - Hydroxyzine PRN  - PTA meds: hydroxyzine 25mg PRN, zolpidem 5mg, melatonin 3mg, lorazepam 0.5mg  - QTc(7/18)- 683 msec, no haldol     Pulmonary:   #Acute hypoxic respiratory failure on iMV  #Mild-moderate emphysema  #History of COVID-19  Off nitric 7/12, extubated 7/20 to HFNC.   Weaned to NC 7/25  - Maintain SpO2> 92%  - Incentive spirometry     Cardiovascular:    #S/p LVAD placement (HeartMate 3) on 7/8/22   #S/p RVAD (29F Protek duo RIJ) on 7/8/22  #S/p chest closure 7/11  #Cardiogenic shock  #Advanced ischemic cardiomyopathy, class IV, stage D  #CAD s/p PCI to LAD (2005)  #S/p CRT-D (2006)  #History of MI (2007)  #Severe MR  #Antiphospholipid antibody syndrome on chronic warfarin  #HTN, HLD  #Recent hospitalization 5/16-5/26 s/p RCA, LAD stenting  #Atrial fibrillation   # RV mobile clot   - Monitor hemodynamic status. Chest closure and oxygenator splicing 7/11. Reopened 7/12 for I&D and left open, closed 7/13. RVAD removed 7/21.  - Goal MAP > 65   - PTA meds(held): clopidogrel 75mg, lasix 40mg, warfarin 5mg  - LVAD management per Advanced HF service  - AC Plan: High intensity Heparin for RV mobile thrombus   - Aspirin 81 mg  - Atorvastatin 40mg  - Hydralazine uriel and prn MAP >85 discontinued  - Captopril 12.5mg TID dose titration per cardiology   - Warfarin 7/24 per pharmacy       GI / Nutrition:   #GERD  #Congestive hepatopathy in the setting of cardiac insuffiencey -resolved  #Hematemesis / oral mucosal bleeding -resolved    - Full liquid diet, SLP following  - Continue NJT at goal   - Nutrition following  - Bowel regimen prn (miralax / senna)  - Trend LFT's every other day    Renal/ Fluid Balance:    - Strict I/O, daily weights   - Avoid/limit nephrotoxins as able  - Replete lytes PRN per protocol  - PTA meds:  tamsulosin 0.4mg (held)  - Goal net negative ~ 1L   Bumex gtt for part of 7/24: total UOP 8L, gtt stopped   Bumex 4mg x2 7/25 with over 6L UOP  - Hold on diuresis today    Endocrine:    #Stress induced hyperglycemia  Preop A1c 5.5%  - Insulin glargine 20U and high sliding scale  - Goal BG <180 for optimal healing     ID/ Antibiotics:  #Periopearive antibiosis (s/p course of Cefepime, vancomycin, rifampin, fluconazole)  #HAP - Enterococcus faecalis PNA, s/p treatment   - 7/12 Pan culture: Bcx x2, UA/UC -> negative    - 7/14 Endotracheal sputum culture (4+ candida albicans, 1+ E.Coli, 3+ enterococcus faecalis)   - Zosyn for HAP(7/15-7/22)  - 7/18 C.diff- negative  - 7/25 Blood cultures drawn for unexplained hypotension   Pending results    Heme:     #Acute blood loss anemia  #Acute blood loss thrombocytopenia  #History of DVT and PE   #Antiphospholipid antibody syndrome  #History of iron deficiency anemia  - Monitor for s/sx of bleeding  - Trend CBC and coags  - Hgb goal >8   Hgb 8.6  - Plt goal > 20    Rheumatology:  #SLE  - Chronic: symptoms include arthritis, photosensitivity, fatigue, and skin manifestations  - PTA meds: hydroxychloroquine 200mg, resumed 7/16  - Rheumatology consulted and following. Follow dsDNA and complement levels(7/18)    -Will need cellcept restarted when appropriate.    Prophylaxis:    - DVT: SCD + high intensity heparin + warfarin   - PPI    Lines / Tubes / Drains:  - R triple lumen PICC (6/17- )  - Left Axillary A-line (7/18- ) - remove once floor bed available  - Fitzgerald (7/8- )  - NJT (7/11- )  - Rectal tube (7/15- ) - remove today      Disposition:  - CVICU  - OK for transfer to floor     Patient seen, findings and plan discussed with CV ICU staff.    Medhat Rosenthal MD  Surgery PGY-3    Rashard Stephenson MD      ----------------------------------------------------------------    Interval Events:  Diuresed heavily during the day, hypotensive overnight requiring brief period of high-dose  vasopressors (epi up to 0.14, levo up to 0.35). 1L LR given. CXR w/o pneumothroax. Blood cx drawn, pending. Lactate normal. Now just on 0.02 epi. Of note, O2 requirements down from HFNC 30LPM at 30% to 4L NC since yesterday.     OBJECTIVE:   1. VITAL SIGNS:   Temp:  [97.9  F (36.6  C)-99.9  F (37.7  C)] 98.9  F (37.2  C)  Pulse:  [105-126] 116  Resp:  [11-35] 17  BP: ()/(26-99) 102/65  MAP:  [35 mmHg-102 mmHg] 72 mmHg  Arterial Line BP: ()/(30-91) 77/63  FiO2 (%):  [30 %] 30 %  SpO2:  [95 %-100 %] 98 %  FiO2 (%): 30 %  Resp: 17      2. INTAKE/ OUTPUT:   I/O last 3 completed shifts:  In: 5764.66 [P.O.:1080; I.V.:1164.66; NG/GT:520; IV Piggyback:1500]  Out: 6390 [Urine:5790; Stool:550; Chest Tube:50]    3. PHYSICAL EXAMINATION:   General: sitting in chair, comfortable  Neuro: follow commands, ongoing intermittent agitation/delerium  Resp: on HFNC, weaning FiO2 support  CV: tachycardic  Abdomen: Soft, mildly distended, Non-tender  Incisions: c/d/i  Extremities: warm and well perfused    4. INVESTIGATIONS:   Arterial Blood Gases   Recent Labs   Lab 07/25/22 2109 07/25/22  0914 07/23/22  1419 07/23/22  1224   PH 7.50* 7.50* 7.47* 7.40   PCO2 46* 46* 39 43   PO2 140* 91 178* 137*   HCO3 36* 35* 29* 27     Complete Blood Count   Recent Labs   Lab 07/26/22 0406 07/25/22 2109 07/25/22  0913 07/25/22  0329   WBC 15.9* 14.9* 14.9* 12.2*   HGB 8.6* 8.5* 9.2* 9.1*    388 377 357     Basic Metabolic Panel  Recent Labs   Lab 07/26/22 0406 07/26/22  0405 07/25/22  2353 07/25/22  2109 07/25/22  1936 07/25/22  1527 07/25/22  1126 07/25/22  0913   *  --   --  134*  --  133*  --  135*   POTASSIUM 4.3  --   --  4.4  --  3.9  --  3.9   CHLORIDE 94*  --   --  91*  --  92*  --  93*   CO2 31*  --   --  33*  --  30*  --  31*   BUN 16.3  --   --  17.2  --  18.2  --  18.2   CR 0.65*  --   --  0.66*  --  0.59*  --  0.57*   * 128* 222* 157*   < > 156*   < > 144*    < > = values in this interval not displayed.      Liver Function Tests  Recent Labs   Lab 07/26/22  0406 07/25/22  2109 07/25/22  0913 07/25/22  0542 07/25/22  0329 07/24/22  2156 07/23/22  1037 07/23/22  0603 07/22/22  0457 07/21/22  2149   AST  --  32  --  27  --   --   --  28  --  27   ALT  --  12  --  12  --   --   --  12  --  11   ALKPHOS  --  234*  --  203*  --   --   --  199*  --  155*   BILITOTAL  --  0.6  --  0.6  --   --   --  0.6  --  0.7   ALBUMIN 2.8* 2.8*  --  2.9* 2.9*  --    < > 3.0*   < > 2.7*   INR 1.31*  --  1.16*  --  1.20* 1.26*   < >  --    < > 1.29*    < > = values in this interval not displayed.     Pancreatic Enzymes  No lab results found in last 7 days.  Coagulation Profile  Recent Labs   Lab 07/26/22  0406 07/25/22  0913 07/25/22  0329 07/24/22  2156 07/24/22  1639 07/24/22  0953 07/24/22  0405 07/23/22  2124 07/23/22  1617   INR 1.31* 1.16* 1.20* 1.26*   < > 1.16* 1.22* 1.23* 1.19*   PTT  --   --   --   --   --  100* >240* 105* 74*    < > = values in this interval not displayed.         5. RADIOLOGY:   Recent Results (from the past 24 hour(s))   XR Chest Port 1 View    Narrative    EXAM: XR CHEST PORT 1 VIEW  7/25/2022 1:07 PM    HISTORY: 61 years Male s/p left chest tube removal.     COMPARISON: Chest radiograph from same day at 5:47 AM.    TECHNIQUE: Portable AP view of the chest.     FINDINGS:   Postsurgical changes of the chest. Median sternotomy wires appear  intact. Left chest wall implantable cardiac defibrillator present with  stable alignment of the leads. LVAD present. Surgical clips overlie  the right axillary region. Right upper extremity PICC tip terminates  in the high SVC. Enteric tube courses below level of diaphragm and  below the field of view. Interval removal of left basilar chest tube.  Coronary artery stents present.    Midline trachea. Stable enlarged cardiomediastinal silhouette.  Silhouetting of the right and left hemidiaphragms with bilateral  costophrenic angle blunting. Stable small bilateral pleural  effusions.  No discernible pneumothorax. Stable bilateral perihilar/basilar hazy  opacities.      Impression    IMPRESSION:   1.  Interval removal of the left basilar chest tube. No definite  pneumothorax.  2.  Similar-appearing bilateral perihilar/basilar opacities and small  pleural effusions.  3.  Cardiomegaly.  4.  Stable support devices.    I have personally reviewed the examination and initial interpretation  and I agree with the findings.    CONSTANTINO OWEN MD         SYSTEM ID:  [id number]   XR Chest Port 1 View    Narrative    EXAM: XR Chest 1 view 7/25/2022 9:29 PM      HISTORY: concern for pneumothorax.    COMPARISON: Same-day radiograph of the chest at 1238.     TECHNIQUE: Frontal view of the chest.    FINDINGS: Right-sided PICC with tip in the mid SVC. Left sided  implantable cardiac defibrillator. LVAD. Enteric tube is  subdiaphragmatic with tip collimated from the study. Postoperative  changes of the chest and median sternotomy wires intact.  Trachea is midline. Cardiac mediastinal silhouette is stable. Coronary  artery stent. Mild interstitial pulmonary opacities. Small bilateral  pleural effusions. No pneumothoraces.      Impression    IMPRESSION:   1. No pneumothoraces.  2. Mild interstitial pulmonary opacities with bilateral small pleural  effusions, likely secondary to edema.    I have personally reviewed the examination and initial interpretation  and I agree with the findings.    YESENIA KEITH DO         SYSTEM ID:  [id number]       =========================================

## 2022-07-26 NOTE — PROGRESS NOTES
[facility]    Cardiology Progress Note- Cards 2        Date of Admission:  6/17/2022     Assessment & Plan: HVSL   Dandy EDUARD Sands is a 61 year old male with PMH of lupus, antiphospholipid syndrome, HTN, HLD, HFrEF 2/2 ICM who presented with cardiogenic shock c/b multiorgan failure (JOSE, shock liver) requiring mechanical support with IABP, now s/p HM3 LVAD 7/8/22 by Dr. Zamora with intraoperative course c/b RV failure s/p 29F Protek duo via RIJ. Post op course c/b hemopericardium s/p repeat washout with chest left open. Chest closed 7/13/22 and decannulated from RVAD 7/21    #HFrEF 2/2 ICM s/p HM3 LVAD in setting of cardiogenic shock (SCAI D)  #RV failure s/p Protek duo temporary RVAD s/p decannulation 7/21  #RV thrombus  #Post chest closure hemopericardium s/p repeat washout (7/12)  Patient with ICM s/p HM3 LVAD 7/8/22 by Dr. Zamora in setting of presentation for cardiogenic shock requiring MCS with IABP as prior to HM (initially evaluated for heart transplant, however noted to have substantially elevated DSAs during course of care, so decision made to proceed with LVAD given patient already on temporary MCS). Intraoperative course c/b RV failure resulting in cardiogenic shock requiring high dose pressors necessitating RVAD support. Chest closed 7/11 and Ashli weaned. However developed tachycardia, lactic acidosis and decreased hemoglobin with CT scan noting hemopericardium. Returned to OR where underwent washout with large clot burden noted over the right atrium and around LVAD outflow graft. Chest left open and returned to ICU where pressors were able to be weaned. Noted to have stable RVAD/LVAD flows throughout and post procedurally. Ultimately returned to OR for repeat closure. Currently hemodynamically stable with stable end-organ function and hemoglobin. Extubated and decannulated from RVAD on 7/21 with good tolerance from hemodynamic profile. Will continue  to monitor closely to optimize LVAD flows and end organ function. Hypotensive episode on 7/25 in setting of large volume diuresis. Improved overnight and off pressors this AM. Likely d/t rapid volume shifts.   -LVAD: continues to have good flows, no alarms and stable end organ perfusion; continue speed at 5200; will plan for increase this evening pending stable hemodynamics  -AC, antiplatelets: continue ASA; heparin high intensity for RV thrombus; warfarin started  -Volume status: appears slightly hypervolemic; bumex 4mg IV x1 this AM  -rhythm: sinus   -of note, patient with climbing capture threshold of RV lead; will need to be evaluated in future by EP team  -hypertension: continue captopril 12.5mg TID  -delirium precautions    The patient's care was discussed with the Attending Physician, Dr. Lora, Bedside Nurse and Primary team.     Emelyn Meneses MD  [facility]    ______________________________________________________________________    Interval History   Nursing notes reviewed. Noted for hypotensive episode this AM. Pressors off this AM. Delightfully confused this morning but pleasant and interactive.     Data reviewed today: I reviewed all medications, new labs and imaging results over the last 24 hours    Physical Exam   Vital Signs: Temp: 98.9  F (37.2  C) Temp src: Axillary BP: 102/65 Pulse: 116   Resp: 17 SpO2: 98 % O2 Device: Nasal cannula Oxygen Delivery: 4 LPM  Weight: 173 lbs 15.09 oz  General Appearance: Older male in NAD breathing on NC  Respiratory: coarse lung sounds, nonlabored  Cardiovascular: vad hum, driveline site c/d/i, chest incisions c/d/i, JVD ~12 cm   GI: soft, bs active   Skin: warm, well perfused, trace pitting edema to mid shin  Neuro: awake, alert, interactive, pleasantly confused, waving to providers    Data   Recent Labs   Lab 07/26/22  0406 07/26/22  0405 07/25/22  2353 07/25/22  2109 07/25/22  1936 07/25/22  1527  07/25/22  1126 07/25/22  0913 07/25/22  0734 07/25/22  0542 07/25/22  0333 07/25/22  0329   WBC 15.9*  --   --  14.9*  --   --   --  14.9*  --   --   --  12.2*   HGB 8.6*  --   --  8.5*  --   --   --  9.2*  --   --   --  9.1*   MCV 93  --   --  93  --   --   --  92  --   --   --  91     --   --  388  --   --   --  377  --   --   --  357   INR 1.31*  --   --   --   --   --   --  1.16*  --   --   --  1.20*   *  --   --  134*  --  133*  --  135*  --   --   --  137   POTASSIUM 4.3  --   --  4.4  --  3.9  --  3.9  --   --   --  4.2   CHLORIDE 94*  --   --  91*  --  92*  --  93*  --   --   --  95*   CO2 31*  --   --  33*  --  30*  --  31*  --   --   --  33*   BUN 16.3  --   --  17.2  --  18.2  --  18.2  --   --   --  19.7   CR 0.65*  --   --  0.66*  --  0.59*  --  0.57*  --   --   --  0.60*   ANIONGAP 10  --   --  10  --  11  --  11  --   --   --  9   ELDA 8.8  --   --  8.6*  --  8.3*  --  8.5*  --   --   --  8.4*   * 128* 222* 157*   < > 156*   < > 144*   < >  --    < > 116*   ALBUMIN 2.8*  --   --  2.8*  --   --   --   --   --  2.9*  --  2.9*   PROTTOTAL  --   --   --  5.8*  --   --   --   --   --  6.1*  --   --    BILITOTAL  --   --   --  0.6  --   --   --   --   --  0.6  --   --    ALKPHOS  --   --   --  234*  --   --   --   --   --  203*  --   --    ALT  --   --   --  12  --   --   --   --   --  12  --   --    AST  --   --   --  32  --   --   --   --   --  27  --   --     < > = values in this interval not displayed.       Medications     dextrose       EPINEPHrine 0.02 mcg/kg/min (07/26/22 0700)     heparin 2,050 Units/hr (07/26/22 0700)     norepinephrine Stopped (07/26/22 0210)       acetaminophen  975 mg Oral or Feeding Tube Q8H CAMMY     aspirin  81 mg Oral or Feeding Tube Daily     atorvastatin  40 mg Oral QPM     bumetanide  4 mg Intravenous TID     calcium gluconate  1 g Intravenous Once     captopril  12.5 mg Oral TID     DULoxetine  30 mg Oral Daily     fiber modular (NUTRISOURCE FIBER)  1  packet Per Feeding Tube TID     fluticasone-salmeterol  2 puff Inhalation BID     [START ON 8/3/2022] fluticasone-vilanterol  1 puff Inhalation Daily     hydroxychloroquine  200 mg Oral BID     insulin aspart  1-12 Units Subcutaneous Q4H     insulin glargine  20 Units Subcutaneous QAM AC     lidocaine  1 patch Transdermal Q24h    And     lidocaine   Transdermal Q8H CAMMY     melatonin  10 mg Oral QPM     multivitamins w/minerals  15 mL Per Feeding Tube Daily     oxyCODONE  10 mg Oral or Feeding Tube Q6H     pantoprazole  40 mg Per Feeding Tube QAM AC     perflutren diluted 1mL to 2mL with saline  5 mL Intravenous Once     piperacillin-tazobactam  4.5 g Intravenous Q6H     protein modular  1 packet Per Feeding Tube TID     QUEtiapine  100 mg Oral or Feeding Tube At Bedtime     sodium chloride (PF)  3 mL Intracatheter Q8H     valproic acid  250 mg Oral 2 times per day     valproic acid  500 mg Oral QPM     vancomycin  1,500 mg Intravenous Q12H     Warfarin Therapy Reminder  1 each Oral See Admin Instructions

## 2022-07-26 NOTE — PROGRESS NOTES
Major Shift Events:  Pt AOx1-2, restless, anxious and difficult to redirect in the AM. FINCH equally, PERRL noted. Pt became more direcabel as day went on. Still disoriented/delirious but seems to be clearing. Sinus tach throughout shift, one episode of mild hypotension with captopril dose, CVTS/CARDS notified. No alarms on LVAD; 5200 RPMs 3.7-4.1 LPM. PI decrease with captopril dose as well, but no low alarms noted. Productive cough. Remains on 4L NC. Rectal tube in place with moderate-large amount of output today. Fitzgerald present, adequate urine output, 6mg Bumex given total today. Nose bleed noted today r/t pt pulling out bridle. Bridle replaced and nose bleed stopped throughout day. TF at goal. Intermittent nausea noted, zofran seemed to help.     Plan: Continue to Monitor. Implement Delirium prevention/treatment practices to reduce confusion.    For vital signs and complete assessments, please see documentation flowsheets.

## 2022-07-26 NOTE — PHARMACY-VANCOMYCIN DOSING SERVICE
"Pharmacy Vancomycin Initial Note  Date of Service 2022  Patient's  1960  61 year old, male    Indication: Bacteremia    Current estimated CrCl = Estimated Creatinine Clearance: 148.2 mL/min (A) (based on SCr of 0.59 mg/dL (L)).    Creatinine for last 3 days  2022:  3:44 AM Creatinine 0.67 mg/dL; 10:37 AM Creatinine 0.58 mg/dL;  4:17 PM Creatinine 0.57 mg/dL;  9:24 PM Creatinine 0.57 mg/dL  2022:  4:05 AM Creatinine 0.58 mg/dL;  9:53 AM Creatinine 0.58 mg/dL;  4:39 PM Creatinine 0.61 mg/dL;  9:56 PM Creatinine 0.62 mg/dL  2022:  3:29 AM Creatinine 0.60 mg/dL;  9:13 AM Creatinine 0.57 mg/dL;  3:27 PM Creatinine 0.59 mg/dL    Recent Vancomycin Level(s) for last 3 days  No results found for requested labs within last 72 hours.      Vancomycin IV Administrations (past 72 hours)                   vancomycin (VANCOCIN) 2,000 mg in sodium chloride 0.9 % 500 mL intermittent infusion (mg) 2,000 mg New Bag 22                Nephrotoxins and other renal medications (From now, onward)    Start     Dose/Rate Route Frequency Ordered Stop    22 1000  vancomycin 1500 mg in 0.9% NaCl 250 ml intermittent infusion 1,500 mg         1,500 mg  over 90 Minutes Intravenous EVERY 12 HOURS 22  vancomycin (VANCOCIN) 2,000 mg in sodium chloride 0.9 % 500 mL intermittent infusion         2,000 mg  over 120 Minutes Intravenous ONCE 22 213  piperacillin-tazobactam (ZOSYN) 4.5 g vial to attach to  mL bag        Note to Pharmacy: For [facility], [facility] and [facility]: For Zosyn-naive patients, use the \"Zosyn initial dose + extended infusion\" order panel.    4.5 g  over 30 Minutes Intravenous EVERY 6 HOURS 22 2100  norepinephrine (LEVOPHED) 16 mg in  mL infusion MAX CONC CENTRAL LINE         0.01-0.6 mcg/kg/min × 67.4 kg (Dosing Weight)  0.6-37.9 mL/hr  Intravenous CONTINUOUS 22 1300  bumetanide " (BUMEX) injection 4 mg         4 mg Intravenous 3 TIMES DAILY (Diuretics and Nitrates) 07/25/22 1230      07/23/22 1400  captopril (CAPOTEN) tablet 12.5 mg         12.5 mg Oral 3 TIMES DAILY 07/23/22 1003            Contrast Orders - past 72 hours (72h ago, onward)    Start     Dose/Rate Route Frequency Stop    07/23/22 1430  perflutren diluted 1mL to 2mL with saline (OPTISON) diluted injection 5 mL         5 mL Intravenous ONCE            Insight[employer] Prediction of Planned Initial Vancomycin Regimen  Load: 2000 mg IV once load  Regimen: 1500 mg IV every 12 hours.  Exposure target: AUC24 (range)400-600 mg/L.hr   AUC24,ss: 567 mg/L.hr  Probability of AUC24 > 400: 82 %  Ctrough,ss: 16.3 mg/L  Probability of Ctrough,ss > 20: 36 %  Probability of nephrotoxicity (Lodise TAD 2009): 12 %          Plan:  1. Vancomycin 2000mg load given  2. Start vancomycin  1500 mg IV q12h, starting 7/26 @ 1000.   3. Vancomycin monitoring method: AUC  4. Vancomycin therapeutic monitoring goal: 400-600 mg*h/L  5. Pharmacy will check vancomycin levels as appropriate in 1-3 Days.    6. Serum creatinine levels will be ordered daily for the first week of therapy and at least twice weekly for subsequent weeks.      Tima Doshi, [facility]

## 2022-07-26 NOTE — PLAN OF CARE
Major Shift Events:  Shift [id number]    Neuro: patient delirious off/on for entire shift. AOx1-2. Opens eyes spontaneously, pupils 4/4. FINCH. Restless, agitated - scheduled Seroquel, melatonin, oxy 10mg, and PRN atarax given. Patient complains of back/shoulder/abdomen pain.    Cardiac: ICD MVP AAI<=>DDD /130bpm. Sinus tach, -125, increased with agitation. CVTS/Cards aware. Heartmate3 LVAD with no alarms for shift, Cards 2 lowered speed to 5200. Patient became hypotensive after shift change/PM meds, MAPs in the 30-40s. CVTS/Cards came to bedside. 1.5L of LR, Levophed and Epi gtt were ordered. New blood cultures and Vanco/Zosyn started. Tmax 99.9.  PICC transduced for CVP 25-30s, MD aware  Levophed off at 2am  Epinephrine titrated to 0.02mcg/kg/min    Resp: oxygen saturation WNL on 4L, increased RR with agitation. Infrequent productive cough. Coarse/dim lung sounds.     GI/: rectal tube intact with 300 out for shift. Fitzgerald intact with increased urine output with diuresis before last night shift change. Total output for shift - 4350 - MD aware. Tube feeds at goal of 50mL/hr, Q4 30mL flushes. Regular diet ordered. Blood sugars 117, 222, 128.    Left axillary arterial line - positional   Right triple lumen PICC  Right lower forearm PIV - heparin at 2050 units/hr, AM Xa 0.55-recheck tomorrow morning.    Plan: Continue POC, wean oxygen as able. Transfer to floor if hemodynamically stable.     For vital signs and complete assessments, please see documentation flowsheets.      Goal Outcome Evaluation:    Plan of Care Reviewed With: patient     Overall Patient Progress: no change    Outcome Evaluation: LVAD- no alarms, delirious, Epi/Levo overnight to maintain MAP>65, 4L NC      Problem: Plan of Care - These are the overarching goals to be used throughout the patient stay.    Goal: Absence of Hospital-Acquired Illness or Injury  Intervention: Prevent Infection  Recent Flowsheet Documentation  Taken 7/26/2022 0400  by Miracle Eldridge RN  Infection Prevention:    hand hygiene promoted    personal protective equipment utilized    rest/sleep promoted    single patient room provided    environmental surveillance performed    equipment surfaces disinfected    cohorting utilized  Taken 7/26/2022 0000 by Miracle Eldridge RN  Infection Prevention:    hand hygiene promoted    personal protective equipment utilized    rest/sleep promoted    single patient room provided    environmental surveillance performed    equipment surfaces disinfected    cohorting utilized  Taken 7/25/2022 2000 by Miracle Eldridge RN  Infection Prevention:    hand hygiene promoted    personal protective equipment utilized    rest/sleep promoted    single patient room provided    environmental surveillance performed    equipment surfaces disinfected    cohorting utilized     Problem: Plan of Care - These are the overarching goals to be used throughout the patient stay.    Goal: Optimal Comfort and Wellbeing  Intervention: Monitor Pain and Promote Comfort  Recent Flowsheet Documentation  Taken 7/26/2022 0400 by Miracle Eldridge RN  Pain Management Interventions:    medication (see MAR)    care clustered    emotional support    environmental changes    distraction    pillow support provided    quiet environment facilitated    relaxation techniques promoted    repositioned    rest  Taken 7/26/2022 0000 by Miracle Eldridge RN  Pain Management Interventions:    medication (see MAR)    care clustered    emotional support    environmental changes    distraction    pillow support provided    quiet environment facilitated    relaxation techniques promoted    repositioned    rest  Taken 7/25/2022 2000 by Miracle Eldridge RN  Pain Management Interventions:    medication (see MAR)    care clustered    emotional support    environmental changes    distraction    pillow support provided    quiet environment facilitated    relaxation techniques promoted    repositioned     rest

## 2022-07-26 NOTE — PROCEDURES
Bridle Placement:   Reason for bridle placement: Previous bridle came undone, snapped in half per bedside RN. Bedside RN taped FT to side of face and marking remains unchanged.   Medicine delivered during procedure: lubricating jelly   Procedure: Successful  Location of top of clip on FT: @ 113 cm marker (unchanged)  Condition of nose/skin at time of bridle placement: Dried mucus and blood noted in bilateral nares - difficult passing through this buildup and proceeded gently. Gave pt breaks when he complained of pain. Bedside RN graciously held pt's hand to help him through procedure.   Face to Face time with patient: 10 minutes.  Erica Maguire RD, LD  Pager: [phone]

## 2022-07-27 ENCOUNTER — APPOINTMENT (OUTPATIENT)
Dept: OCCUPATIONAL THERAPY | Facility: CLINIC | Age: 62
DRG: 001 | End: 2022-07-27
Attending: INTERNAL MEDICINE
Payer: COMMERCIAL

## 2022-07-27 ENCOUNTER — APPOINTMENT (OUTPATIENT)
Dept: SPEECH THERAPY | Facility: CLINIC | Age: 62
DRG: 001 | End: 2022-07-27
Attending: INTERNAL MEDICINE
Payer: COMMERCIAL

## 2022-07-27 ENCOUNTER — APPOINTMENT (OUTPATIENT)
Dept: GENERAL RADIOLOGY | Facility: CLINIC | Age: 62
DRG: 001 | End: 2022-07-27
Attending: THORACIC SURGERY (CARDIOTHORACIC VASCULAR SURGERY)
Payer: COMMERCIAL

## 2022-07-27 ENCOUNTER — APPOINTMENT (OUTPATIENT)
Dept: PHYSICAL THERAPY | Facility: CLINIC | Age: 62
DRG: 001 | End: 2022-07-27
Attending: INTERNAL MEDICINE
Payer: COMMERCIAL

## 2022-07-27 LAB
ALBUMIN SERPL BCG-MCNC: 2.6 G/DL (ref 3.5–5.2)
ALP SERPL-CCNC: 225 U/L (ref 40–129)
ALT SERPL W P-5'-P-CCNC: 13 U/L (ref 10–50)
ANION GAP SERPL CALCULATED.3IONS-SCNC: 9 MMOL/L (ref 7–15)
AST SERPL W P-5'-P-CCNC: 32 U/L (ref 10–50)
AT III ACT/NOR PPP CHRO: 87 % (ref 85–135)
BACTERIA BLD CULT: NO GROWTH
BILIRUB SERPL-MCNC: 0.6 MG/DL
BLAIMP ISLT/SPM QL: NOT DETECTED
BLAKPC ISLT/SPM QL: NOT DETECTED
BLAOXA-48 ISLT/SPM QL: NOT DETECTED
BLAVIM ISLT/SPM QL: NOT DETECTED
BUN SERPL-MCNC: 20.2 MG/DL (ref 8–23)
CA-I BLD-MCNC: 4.5 MG/DL (ref 4.4–5.2)
CALCIUM SERPL-MCNC: 8.6 MG/DL (ref 8.8–10.2)
CHLORIDE SERPL-SCNC: 92 MMOL/L (ref 98–107)
CREAT SERPL-MCNC: 0.7 MG/DL (ref 0.67–1.17)
D DIMER PPP FEU-MCNC: 6.08 UG/ML FEU (ref 0–0.5)
DEPRECATED HCO3 PLAS-SCNC: 31 MMOL/L (ref 22–29)
ERYTHROCYTE [DISTWIDTH] IN BLOOD BY AUTOMATED COUNT: 18.8 % (ref 10–15)
FIBRINOGEN PPP-MCNC: 502 MG/DL (ref 170–490)
GFR SERPL CREATININE-BSD FRML MDRD: >90 ML/MIN/1.73M2
GLUCOSE BLDC GLUCOMTR-MCNC: 110 MG/DL (ref 70–99)
GLUCOSE BLDC GLUCOMTR-MCNC: 110 MG/DL (ref 70–99)
GLUCOSE BLDC GLUCOMTR-MCNC: 123 MG/DL (ref 70–99)
GLUCOSE BLDC GLUCOMTR-MCNC: 131 MG/DL (ref 70–99)
GLUCOSE BLDC GLUCOMTR-MCNC: 132 MG/DL (ref 70–99)
GLUCOSE BLDC GLUCOMTR-MCNC: 156 MG/DL (ref 70–99)
GLUCOSE BLDC GLUCOMTR-MCNC: 99 MG/DL (ref 70–99)
GLUCOSE SERPL-MCNC: 135 MG/DL (ref 70–99)
HCT VFR BLD AUTO: 25.3 % (ref 40–53)
HGB BLD-MCNC: 8.2 G/DL (ref 13.3–17.7)
INR PPP: 1.39 (ref 0.85–1.15)
LACTATE SERPL-SCNC: 1 MMOL/L (ref 0.7–2)
MAGNESIUM SERPL-MCNC: 2 MG/DL (ref 1.7–2.3)
MAGNESIUM SERPL-MCNC: 2.7 MG/DL (ref 1.7–2.3)
MAGNESIUM SERPL-MCNC: 2.8 MG/DL (ref 1.7–2.3)
MCH RBC QN AUTO: 29.8 PG (ref 26.5–33)
MCHC RBC AUTO-ENTMCNC: 32.4 G/DL (ref 31.5–36.5)
MCV RBC AUTO: 92 FL (ref 78–100)
NDM TARGET DNA: NOT DETECTED
PHOSPHATE SERPL-MCNC: 3.8 MG/DL (ref 2.5–4.5)
PHOSPHATE SERPL-MCNC: 4.3 MG/DL (ref 2.5–4.5)
PHOSPHATE SERPL-MCNC: 4.4 MG/DL (ref 2.5–4.5)
PLATELET # BLD AUTO: 311 10E3/UL (ref 150–450)
POTASSIUM SERPL-SCNC: 3.7 MMOL/L (ref 3.4–5.3)
POTASSIUM SERPL-SCNC: 4.1 MMOL/L (ref 3.4–5.3)
POTASSIUM SERPL-SCNC: 4.1 MMOL/L (ref 3.4–5.3)
PROT SERPL-MCNC: 5.9 G/DL (ref 6.4–8.3)
RBC # BLD AUTO: 2.75 10E6/UL (ref 4.4–5.9)
SODIUM SERPL-SCNC: 132 MMOL/L (ref 136–145)
UFH PPP CHRO-ACNC: 0.43 IU/ML
UFH PPP CHRO-ACNC: 0.71 IU/ML
UFH PPP CHRO-ACNC: 1.05 IU/ML
WBC # BLD AUTO: 9.1 10E3/UL (ref 4–11)

## 2022-07-27 PROCEDURE — 99291 CRITICAL CARE FIRST HOUR: CPT | Mod: 25 | Performed by: INTERNAL MEDICINE

## 2022-07-27 PROCEDURE — 250N000013 HC RX MED GY IP 250 OP 250 PS 637: Performed by: INTERNAL MEDICINE

## 2022-07-27 PROCEDURE — 250N000013 HC RX MED GY IP 250 OP 250 PS 637: Performed by: SURGERY

## 2022-07-27 PROCEDURE — 250N000013 HC RX MED GY IP 250 OP 250 PS 637: Performed by: STUDENT IN AN ORGANIZED HEALTH CARE EDUCATION/TRAINING PROGRAM

## 2022-07-27 PROCEDURE — 85520 HEPARIN ASSAY: CPT | Performed by: SURGERY

## 2022-07-27 PROCEDURE — 250N000011 HC RX IP 250 OP 636: Performed by: THORACIC SURGERY (CARDIOTHORACIC VASCULAR SURGERY)

## 2022-07-27 PROCEDURE — 85027 COMPLETE CBC AUTOMATED: CPT | Performed by: SURGERY

## 2022-07-27 PROCEDURE — 83735 ASSAY OF MAGNESIUM: CPT | Performed by: SURGERY

## 2022-07-27 PROCEDURE — 85379 FIBRIN DEGRADATION QUANT: CPT | Performed by: SURGERY

## 2022-07-27 PROCEDURE — 85520 HEPARIN ASSAY: CPT | Performed by: THORACIC SURGERY (CARDIOTHORACIC VASCULAR SURGERY)

## 2022-07-27 PROCEDURE — 84132 ASSAY OF SERUM POTASSIUM: CPT | Performed by: THORACIC SURGERY (CARDIOTHORACIC VASCULAR SURGERY)

## 2022-07-27 PROCEDURE — 87798 DETECT AGENT NOS DNA AMP: CPT | Performed by: INTERNAL MEDICINE

## 2022-07-27 PROCEDURE — 250N000013 HC RX MED GY IP 250 OP 250 PS 637: Performed by: THORACIC SURGERY (CARDIOTHORACIC VASCULAR SURGERY)

## 2022-07-27 PROCEDURE — 84132 ASSAY OF SERUM POTASSIUM: CPT | Performed by: SURGERY

## 2022-07-27 PROCEDURE — 83735 ASSAY OF MAGNESIUM: CPT | Performed by: THORACIC SURGERY (CARDIOTHORACIC VASCULAR SURGERY)

## 2022-07-27 PROCEDURE — 200N000002 HC R&B ICU UMMC

## 2022-07-27 PROCEDURE — 80053 COMPREHEN METABOLIC PANEL: CPT | Performed by: SURGERY

## 2022-07-27 PROCEDURE — 93750 INTERROGATION VAD IN PERSON: CPT | Performed by: INTERNAL MEDICINE

## 2022-07-27 PROCEDURE — 250N000013 HC RX MED GY IP 250 OP 250 PS 637: Performed by: ANESTHESIOLOGY

## 2022-07-27 PROCEDURE — 85300 ANTITHROMBIN III ACTIVITY: CPT | Performed by: SURGERY

## 2022-07-27 PROCEDURE — 84100 ASSAY OF PHOSPHORUS: CPT | Performed by: SURGERY

## 2022-07-27 PROCEDURE — 83605 ASSAY OF LACTIC ACID: CPT | Performed by: SURGERY

## 2022-07-27 PROCEDURE — G0463 HOSPITAL OUTPT CLINIC VISIT: HCPCS

## 2022-07-27 PROCEDURE — 71045 X-RAY EXAM CHEST 1 VIEW: CPT

## 2022-07-27 PROCEDURE — 92526 ORAL FUNCTION THERAPY: CPT | Mod: GN

## 2022-07-27 PROCEDURE — 71045 X-RAY EXAM CHEST 1 VIEW: CPT | Mod: 26 | Performed by: RADIOLOGY

## 2022-07-27 PROCEDURE — 97535 SELF CARE MNGMENT TRAINING: CPT | Mod: GO | Performed by: OCCUPATIONAL THERAPIST

## 2022-07-27 PROCEDURE — 97530 THERAPEUTIC ACTIVITIES: CPT | Mod: GP

## 2022-07-27 PROCEDURE — 85610 PROTHROMBIN TIME: CPT | Performed by: SURGERY

## 2022-07-27 PROCEDURE — 250N000011 HC RX IP 250 OP 636: Performed by: STUDENT IN AN ORGANIZED HEALTH CARE EDUCATION/TRAINING PROGRAM

## 2022-07-27 PROCEDURE — 999N000015 HC STATISTIC ARTERIAL MONITORING DAILY

## 2022-07-27 PROCEDURE — 84100 ASSAY OF PHOSPHORUS: CPT | Performed by: THORACIC SURGERY (CARDIOTHORACIC VASCULAR SURGERY)

## 2022-07-27 PROCEDURE — 87040 BLOOD CULTURE FOR BACTERIA: CPT | Performed by: SURGERY

## 2022-07-27 PROCEDURE — 97116 GAIT TRAINING THERAPY: CPT | Mod: GP

## 2022-07-27 PROCEDURE — 82330 ASSAY OF CALCIUM: CPT | Performed by: SURGERY

## 2022-07-27 PROCEDURE — 85384 FIBRINOGEN ACTIVITY: CPT | Performed by: SURGERY

## 2022-07-27 PROCEDURE — 36415 COLL VENOUS BLD VENIPUNCTURE: CPT | Performed by: SURGERY

## 2022-07-27 RX ORDER — MAGNESIUM SULFATE HEPTAHYDRATE 40 MG/ML
2 INJECTION, SOLUTION INTRAVENOUS ONCE
Status: COMPLETED | OUTPATIENT
Start: 2022-07-27 | End: 2022-07-27

## 2022-07-27 RX ORDER — BUMETANIDE 0.25 MG/ML
3 INJECTION INTRAMUSCULAR; INTRAVENOUS
Status: DISCONTINUED | OUTPATIENT
Start: 2022-07-27 | End: 2022-07-31

## 2022-07-27 RX ORDER — OXYCODONE HYDROCHLORIDE 5 MG/1
5 TABLET ORAL EVERY 8 HOURS
Status: DISCONTINUED | OUTPATIENT
Start: 2022-07-27 | End: 2022-08-02

## 2022-07-27 RX ORDER — POTASSIUM CHLORIDE 29.8 MG/ML
20 INJECTION INTRAVENOUS ONCE
Status: COMPLETED | OUTPATIENT
Start: 2022-07-27 | End: 2022-07-27

## 2022-07-27 RX ORDER — BUMETANIDE 0.25 MG/ML
3 INJECTION INTRAMUSCULAR; INTRAVENOUS ONCE
Status: COMPLETED | OUTPATIENT
Start: 2022-07-27 | End: 2022-07-27

## 2022-07-27 RX ORDER — WARFARIN SODIUM 7.5 MG/1
7.5 TABLET ORAL
Status: COMPLETED | OUTPATIENT
Start: 2022-07-27 | End: 2022-07-27

## 2022-07-27 RX ADMIN — HYDROXYCHLOROQUINE SULFATE 200 MG: 200 TABLET, FILM COATED ORAL at 19:50

## 2022-07-27 RX ADMIN — BUMETANIDE 3 MG: 0.25 INJECTION, SOLUTION INTRAMUSCULAR; INTRAVENOUS at 13:33

## 2022-07-27 RX ADMIN — POTASSIUM CHLORIDE 20 MEQ: 29.8 INJECTION, SOLUTION INTRAVENOUS at 22:59

## 2022-07-27 RX ADMIN — HYDROXYZINE HYDROCHLORIDE 50 MG: 25 TABLET ORAL at 16:40

## 2022-07-27 RX ADMIN — Medication 6.25 MG: at 13:39

## 2022-07-27 RX ADMIN — MULTIVITAMIN 15 ML: LIQUID ORAL at 07:27

## 2022-07-27 RX ADMIN — Medication 1 PACKET: at 19:51

## 2022-07-27 RX ADMIN — HYDROXYCHLOROQUINE SULFATE 200 MG: 200 TABLET, FILM COATED ORAL at 07:27

## 2022-07-27 RX ADMIN — OXYCODONE HYDROCHLORIDE 5 MG: 5 TABLET ORAL at 03:19

## 2022-07-27 RX ADMIN — BUMETANIDE 3 MG: 0.25 INJECTION, SOLUTION INTRAMUSCULAR; INTRAVENOUS at 07:27

## 2022-07-27 RX ADMIN — HEPARIN SODIUM AND DEXTROSE 1850 UNITS/HR: 10000; 5 INJECTION INTRAVENOUS at 05:57

## 2022-07-27 RX ADMIN — HYDROXYZINE HYDROCHLORIDE 50 MG: 25 TABLET ORAL at 00:49

## 2022-07-27 RX ADMIN — WARFARIN SODIUM 7.5 MG: 7.5 TABLET ORAL at 18:42

## 2022-07-27 RX ADMIN — OXYCODONE HYDROCHLORIDE 10 MG: 10 TABLET ORAL at 05:59

## 2022-07-27 RX ADMIN — OLANZAPINE 5 MG: 5 TABLET, FILM COATED ORAL at 19:50

## 2022-07-27 RX ADMIN — Medication 1 PACKET: at 13:39

## 2022-07-27 RX ADMIN — Medication 6.25 MG: at 07:29

## 2022-07-27 RX ADMIN — Medication 1 PACKET: at 07:28

## 2022-07-27 RX ADMIN — DIGOXIN 125 MCG: 125 TABLET ORAL at 07:28

## 2022-07-27 RX ADMIN — Medication 40 MG: at 07:28

## 2022-07-27 RX ADMIN — FLUTICASONE PROPIONATE AND SALMETEROL XINAFOATE 2 PUFF: 115; 21 AEROSOL, METERED RESPIRATORY (INHALATION) at 20:08

## 2022-07-27 RX ADMIN — MAGNESIUM SULFATE HEPTAHYDRATE 2 G: 40 INJECTION, SOLUTION INTRAVENOUS at 06:29

## 2022-07-27 RX ADMIN — ACETAMINOPHEN 975 MG: 325 TABLET, FILM COATED ORAL at 13:39

## 2022-07-27 RX ADMIN — Medication 6.25 MG: at 19:53

## 2022-07-27 RX ADMIN — OXYCODONE HYDROCHLORIDE 5 MG: 5 TABLET ORAL at 12:09

## 2022-07-27 RX ADMIN — BUMETANIDE 3 MG: 0.25 INJECTION, SOLUTION INTRAMUSCULAR; INTRAVENOUS at 16:22

## 2022-07-27 RX ADMIN — DULOXETINE 30 MG: 30 CAPSULE, DELAYED RELEASE ORAL at 07:28

## 2022-07-27 RX ADMIN — ACETAMINOPHEN 975 MG: 325 TABLET, FILM COATED ORAL at 22:10

## 2022-07-27 RX ADMIN — ASPIRIN 81 MG CHEWABLE TABLET 81 MG: 81 TABLET CHEWABLE at 07:28

## 2022-07-27 RX ADMIN — HEPARIN SODIUM AND DEXTROSE 1750 UNITS/HR: 10000; 5 INJECTION INTRAVENOUS at 20:41

## 2022-07-27 RX ADMIN — OLANZAPINE 5 MG: 5 TABLET, FILM COATED ORAL at 07:27

## 2022-07-27 RX ADMIN — TRAZODONE HYDROCHLORIDE 50 MG: 50 TABLET ORAL at 22:10

## 2022-07-27 RX ADMIN — OXYCODONE HYDROCHLORIDE 5 MG: 5 TABLET ORAL at 18:42

## 2022-07-27 RX ADMIN — INSULIN ASPART 1 UNITS: 100 INJECTION, SOLUTION INTRAVENOUS; SUBCUTANEOUS at 08:08

## 2022-07-27 RX ADMIN — Medication 10 MG: at 19:50

## 2022-07-27 RX ADMIN — INSULIN GLARGINE 20 UNITS: 100 INJECTION, SOLUTION SUBCUTANEOUS at 08:08

## 2022-07-27 RX ADMIN — ATORVASTATIN CALCIUM 40 MG: 40 TABLET, FILM COATED ORAL at 19:50

## 2022-07-27 RX ADMIN — ACETAMINOPHEN 975 MG: 325 TABLET, FILM COATED ORAL at 05:57

## 2022-07-27 ASSESSMENT — ACTIVITIES OF DAILY LIVING (ADL)
ADLS_ACUITY_SCORE: 34
ADLS_ACUITY_SCORE: 36
ADLS_ACUITY_SCORE: 34

## 2022-07-27 NOTE — PROGRESS NOTES
[facility] Nurse Inpatient Assessment     Consulted for: R) temporal wound    Patient History (according to provider note(s):      Dandy Sands is a 61 year old male with a PMH of CAD s/p PCI to LAD, MI, ICM, acute on chronic heart failure, s/p CRT-D, severe MR, antiphospholipid antibody syndrome on chronic warfarin, SLE, HTN, HLD, recent hospitalization 5/16-5/26 s/p RCA, LAD stenting who underwent RVAD (29F Protek duo RIJ) LVAD placement (HeartMate 3) on 7/8/22 by Dr. Zamora.    Areas Assessed:      Areas visualized during today's visit: head    Pressure Injury Location: R) temporal area        7/13 7/20 7/27    Last photo: 7/13  Wound type: Pressure Injury     Pressure Injury Stage: Deep Tissue Pressure Injury (DTPI), hospital acquired      This is a Medical Device Related Pressure Injury (MDRPI) due to RVAD Cannula was pressing against the area and wrapped with coban. Currently has optifoam in place and tube is pulled away from the area avoiding direct pressure.  Wound history/plan of care:   Per RN tube was directly laying on the area,    Wound base: 100 % non-blanchable, maroon, purple and epidermis,      Palpation of the wound bed: normal      Drainage: none     Description of drainage: none     Measurements (length x width x depth, in cm) now two separate areas- 1  x 1.2  x  0.1 cm; bleeding and 1.1x0.6x0.1 cm with fibrinous grayish tissue      Tunneling N/A     Undermining N/A  Periwound skin: Intact      Color: normal and consistent with surrounding tissue      Temperature: normal   Odor: none  Pain: unable to assess due to  sedation , none  Pain intervention prior to dressing change: N/A  Treatment goal: Heal  and Protection  STATUS: evolving  Supplies ordered: supplies stored on unit    Treatment Plan:     R) temporal area wound(s): Every 3 days       Cleanse the area  with NS and pat dry.    Apply No sting film barrier to periwound skin.    Cover wound with Mepilex     Change dressing Q 3 days.      Orders: Reviewed    RECOMMEND PRIMARY TEAM ORDER: None, at this time  Education provided: importance of repositioning, plan of care, wound progress and Off-loading pressure  Discussed plan of care with: Nurse  WOC nurse follow-up plan: weekly  Notify WOC if wound(s) deteriorate.  Nursing to notify the Provider(s) and re-consult the WOC Nurse if new skin concern.    DATA:     Current support surface: Standard  Low air loss mattress  Containment of urine/stool: Indwelling catheter  BMI: Body mass index is 26.66 kg/m .   Active diet order: Orders Placed This Encounter      Regular Diet Adult         Labs: Recent Labs     Recent Labs   Lab Test 07/20/22  0949 07/20/22  0355 06/19/22  2157 06/19/22  1522 05/20/22  1715 05/20/22  0516   ALBUMIN  --  2.7*   < > 2.8*   < > 3.1*   HGB 8.5* 8.1*   < > 8.5*   < > 8.6*   INR 1.29* 1.35*   < >  --    < > 1.24*   WBC 15.9* 13.9*   < > 5.2   < > 5.5   A1C  --   --   --   --   --  5.5   CRP  --   --   --  28.0*  --   --     < > = values in this interval not displayed.       Pressure injury risk assessment:   Sensory Perception: 3-->slightly limited  Moisture: 3-->occasionally moist  Activity: 2-->chairfast  Mobility: 2-->very limited  Nutrition: 3-->adequate  Friction and Shear: 2-->potential problem  Case Score: 15      Pager: [phone]  Ph: [phone]

## 2022-07-27 NOTE — PROGRESS NOTES
CV ICU PROGRESS NOTE  07/27/2022        CO-MORBIDITIES  1. Cardiogenic shock (H)  (primary encounter diagnosis)  2. Ischemic cardiomyopathy  3. Heart failure with reduced ejection fraction, NYHA class III (H)  4. Coronary artery disease involving native coronary artery of native heart without angina pectoris      ASSESSMENT Dandy Sands is a 61 year old male with a PMH of CAD s/p PCI to LAD, MI, ICM, acute on chronic heart failure, s/p CRT-D, severe MR, antiphospholipid antibody syndrome on chronic warfarin, SLE, HTN, HLD, recent hospitalization 5/16-5/26 s/p RCA, LAD stenting who underwent RVAD (29F Protek duo RIJ) LVAD placement (HeartMate 3) on 7/8/22 by Dr. Zamora. Intraoperative course complicated by IABP dysfunction and RV failure, requiring crash onto bypass / vasopressor support, NO, and protek placement. Now s/p chest closure and NO wean 7/11. Return to OR for hemopericardium (7/12) and chest re-closure 7/13. Protek RVAD removed 7/21.     Today's Progress:  Neuro: further decreasing scheduled and prn oxy with anticipation of weaning off scheduled oxycodone 7/28.  Resp: comfortable on nasal cannula  CV: Captopril per cards, Warfarin with heparin bridge for LVAD/mobile RV thrombus. Off vasopressors.  GI: no changes. Eating, stooling.  Renal: 2.5L UOP last 24 hours without diuretic support. Bumex 3mg BID, monitor UOP.  Endo: Glargine + sliding scale  ID: all cultures NGTD.  Heme: Hgb stable, on Warfarin and still on hep gtt for RV thrombus  Dispo: Transfer to floor    PLAN:   Neuro/ pain/ sedation:  #Acute Postoperative pain  #Depression   #Anxiety due to a medical condition  #Insomnia  #Delerium   Delirious intermittently.   - Monitor neurological status. Notify the MD for any acute changes in exam.  - Pain:    -Scheduled tylenol, uriel oxy 5mg q8h   -PRN oxycodone 5mg q6h  - Depression: Duloxetine 30mg  - Sleep: Melatonin 10 mg HS   - discontinue Seroquel 100 mg HS   - start trazodone    - Zyprexa 5mg  BID  - Anxiety: Valproate po 250 mg BID and 500mg Bedtime   - Hydroxyzine PRN  - PTA meds: hydroxyzine 25mg PRN, zolpidem 5mg, melatonin 3mg, lorazepam 0.5mg  - QTc(7/18)- 683 msec, no haldol     Pulmonary:   #Acute hypoxic respiratory failure on iMV  #Mild-moderate emphysema  #History of COVID-19  Nasal cannula 4L   - Maintain SpO2> 92%  - Incentive spirometry     Cardiovascular:    #S/p LVAD placement (HeartMate 3) on 7/8/22   #S/p RVAD (29F Protek duo RIJ) on 7/8/22  #S/p chest closure 7/11  #Cardiogenic shock  #Advanced ischemic cardiomyopathy, class IV, stage D  #CAD s/p PCI to LAD (2005)  #S/p CRT-D (2006)  #History of MI (2007)  #Severe MR  #Antiphospholipid antibody syndrome on chronic warfarin  #HTN, HLD  #Recent hospitalization 5/16-5/26 s/p RCA, LAD stenting  #Atrial fibrillation   # RV mobile clot   - Monitor hemodynamic status. Chest closure and oxygenator splicing 7/11. Reopened 7/12 for I&D and left open, closed 7/13. RVAD removed 7/21.  - Goal MAP > 65   - PTA meds(held): clopidogrel 75mg, lasix 40mg, warfarin 5mg  - LVAD management per Advanced HF service  - AC Plan: High intensity Heparin for RV mobile thrombus   - Aspirin 81 mg  - Atorvastatin 40mg  - Hydralazine uriel and prn MAP >85 discontinued  - Captopril 12.5mg TID dose titration per cardiology   - Warfarin 7/24 per pharmacy       GI / Nutrition:   #GERD  #Congestive hepatopathy in the setting of cardiac insuffiencey -resolved  #Hematemesis / oral mucosal bleeding -resolved    - Full liquid diet, SLP following  - Continue NJT at goal   - Nutrition following  - Bowel regimen prn (miralax / senna)  - Trend LFT's every other day    Renal/ Fluid Balance:    - Strict I/O, daily weights   - Avoid/limit nephrotoxins as able  - Replete lytes PRN per protocol  - PTA meds: tamsulosin 0.4mg (held)  - Goal net negative ~ 1L   Bumex gtt for part of 7/24: total UOP 8L, gtt stopped   Bumex 4mg x2 7/25 with over 6L UOP  - Bumex 3mg BID 7/27    Endocrine:     #Stress induced hyperglycemia  Preop A1c 5.5%  - Insulin glargine 20U and high sliding scale  - Goal BG <180 for optimal healing     ID/ Antibiotics:  #Periopearive antibiosis (s/p course of Cefepime, vancomycin, rifampin, fluconazole)  #HAP - Enterococcus faecalis PNA, s/p treatment   - 7/12 Pan culture: Bcx x2, UA/UC -> negative    - 7/14 Endotracheal sputum culture (4+ candida albicans, 1+ E.Coli, 3+ enterococcus faecalis)   - Zosyn for HAP(7/15-7/22)  - 7/18 C.diff- negative  - 7/25 Blood cultures drawn for unexplained hypotension   Pending results    Heme:     #Acute blood loss anemia  #Acute blood loss thrombocytopenia  #History of DVT and PE   #Antiphospholipid antibody syndrome  #History of iron deficiency anemia  - Monitor for s/sx of bleeding  - Trend CBC and coags  - Hgb goal >8   Hgb 8.6  - Plt goal > 20    Rheumatology:  #SLE  - Chronic: symptoms include arthritis, photosensitivity, fatigue, and skin manifestations  - PTA meds: hydroxychloroquine 200mg, resumed 7/16  - Rheumatology consulted and following. Follow dsDNA and complement levels(7/18)    -Will need cellcept restarted when appropriate.    Prophylaxis:    - DVT: SCD + high intensity heparin + warfarin   - PPI    Lines / Tubes / Drains:  - R triple lumen PICC (6/17- )  - Left Axillary A-line (7/18- ) - remove once floor bed available  - Fitzgerald (7/8- )  - NJT (7/11- )  - Rectal tube (7/15- ) - remove today      Disposition:  - CVICU  - OK for transfer to floor     Patient seen, findings and plan discussed with CV ICU staff.    Medhat Rosenthal MD  Surgery PGY-3  ----------------------------------------------------------------    Interval Events:  No acute events. UOP 2.5L without additional pharmacological diuretic. Net even last 24 hours. Weaned resp support from HFNC to 4L NC.     OBJECTIVE:   1. VITAL SIGNS:   Temp:  [97.4  F (36.3  C)-98.4  F (36.9  C)] 97.6  F (36.4  C)  Pulse:  [104-125] 108  Resp:  [11-35] 21  BP: (77-93)/(70-79)  93/79  MAP:  [61 mmHg-142 mmHg] 88 mmHg  Arterial Line BP: ()/() 96/79  SpO2:  [73 %-100 %] 100 %  Resp: 21      2. INTAKE/ OUTPUT:   I/O last 3 completed shifts:  In: 3575.22 [P.O.:240; I.V.:1355.22; NG/GT:780]  Out: 3460 [Urine:2810; Stool:650]    3. PHYSICAL EXAMINATION:   General: sitting in chair, comfortable  Neuro: follow commands, intermittent delerium  Resp: on 4L NC  CV: tachycardic  Abdomen: Soft, mildly distended, Non-tender  Incisions: c/d/i  Extremities: warm and well perfused    4. INVESTIGATIONS:   Arterial Blood Gases   Recent Labs   Lab 07/25/22 2109 07/25/22  0914 07/23/22  1419 07/23/22  1224   PH 7.50* 7.50* 7.47* 7.40   PCO2 46* 46* 39 43   PO2 140* 91 178* 137*   HCO3 36* 35* 29* 27     Complete Blood Count   Recent Labs   Lab 07/27/22  0338 07/26/22  0406 07/25/22  2109 07/25/22  0913   WBC 9.1 15.9* 14.9* 14.9*   HGB 8.2* 8.6* 8.5* 9.2*    372 388 377     Basic Metabolic Panel  Recent Labs   Lab 07/27/22  0338 07/27/22  0335 07/27/22  0019 07/26/22  2021 07/26/22  1558 07/26/22  1547 07/26/22  0729 07/26/22  0406 07/25/22  2353 07/25/22  2109   *  --   --   --   --  134*  --  135*  --  134*   POTASSIUM 4.1  --   --   --   --  4.5  --  4.3  --  4.4   CHLORIDE 92*  --   --   --   --  95*  --  94*  --  91*   CO2 31*  --   --   --   --  33*  --  31*  --  33*   BUN 20.2  --   --   --   --  17.9  --  16.3  --  17.2   CR 0.70  --   --   --   --  0.73  --  0.65*  --  0.66*   * 131* 123* 100*   < > 84   < > 129*   < > 157*    < > = values in this interval not displayed.     Liver Function Tests  Recent Labs   Lab 07/27/22  0338 07/26/22  0406 07/25/22  2109 07/25/22  0913 07/25/22  0542 07/25/22  0329   AST 32 33 32  --  27  --    ALT 13 13 12  --  12  --    ALKPHOS 225* 220* 234*  --  203*  --    BILITOTAL 0.6 0.6 0.6  --  0.6  --    ALBUMIN 2.6* 2.8*  2.8* 2.8*  --  2.9* 2.9*   INR 1.39* 1.31*  --  1.16*  --  1.20*     Pancreatic Enzymes  No lab results found in  last 7 days.  Coagulation Profile  Recent Labs   Lab 07/27/22  0338 07/26/22  0406 07/25/22  0913 07/25/22  0329 07/24/22  1639 07/24/22  0953 07/24/22  0405 07/23/22  2124 07/23/22  1617   INR 1.39* 1.31* 1.16* 1.20*   < > 1.16* 1.22* 1.23* 1.19*   PTT  --   --   --   --   --  100* >240* 105* 74*    < > = values in this interval not displayed.         5. RADIOLOGY:   No results found for this or any previous visit (from the past 24 hour(s)).    =========================================

## 2022-07-27 NOTE — PLAN OF CARE
Goal Outcome Evaluation:    Plan of Care Reviewed With: patient     Overall Patient Progress: improving    Major Shift Events:  Restless and confused, disoriented to situation and occasionally time. Redirectable, prn atarax and scheduled Zyprexa helpful. SR. VSS on 4L NC. CVP 27, 23, 24, MD  notified and aware. Rectal tube with minimal output, Fitzgerald adequate output.    Plan: Transfer to floor, delirium precautions.   For vital signs and complete assessments, please see documentation flowsheets.

## 2022-07-27 NOTE — PROGRESS NOTES
[facility]    Cardiology Progress Note- Cards 2        Date of Admission:  6/17/2022     Assessment & Plan: HVSL   Dandy EDUARD Sands is a 61 year old male with PMH of lupus, antiphospholipid syndrome, HTN, HLD, HFrEF 2/2 ICM who presented with cardiogenic shock c/b multiorgan failure (JOSE, shock liver) requiring mechanical support with IABP, now s/p HM3 LVAD 7/8/22 by Dr. Zamora with intraoperative course c/b RV failure s/p 29F Protek duo via RIJ. Post op course c/b hemopericardium s/p repeat washout with chest left open. Chest closed 7/13/22 and decannulated from RVAD 7/21    #HFrEF 2/2 ICM s/p HM3 LVAD in setting of cardiogenic shock (SCAI D)  #RV failure s/p Protek duo temporary RVAD s/p decannulation 7/21  #RV thrombus  #Post chest closure hemopericardium s/p repeat washout (7/12)  Patient with ICM s/p HM3 LVAD 7/8/22 by Dr. Zamora in setting of presentation for cardiogenic shock requiring MCS with IABP as prior to HM (initially evaluated for heart transplant, however noted to have substantially elevated DSAs during course of care, so decision made to proceed with LVAD given patient already on temporary MCS). Intraoperative course c/b RV failure resulting in cardiogenic shock requiring high dose pressors necessitating RVAD support. Chest closed 7/11 and Ashli weaned. However developed tachycardia, lactic acidosis and decreased hemoglobin with CT scan noting hemopericardium. Returned to OR where underwent washout with large clot burden noted over the right atrium and around LVAD outflow graft. Chest left open and returned to ICU where pressors were able to be weaned. Noted to have stable RVAD/LVAD flows throughout and post procedurally. Ultimately returned to OR for repeat closure. Currently hemodynamically stable with stable end-organ function and hemoglobin. Extubated and decannulated from RVAD on 7/21 with good tolerance from hemodynamic profile. Will continue  "to monitor closely to optimize LVAD flows and end organ function.   -LVAD: continues to have good flows, no alarms and stable end organ perfusion; increase speed at 5300  -continue digoxin for RV support  -AC, antiplatelets: continue ASA; heparin high intensity for RV thrombus; warfarin started  -Volume status: appears slightly hypervolemic; bumex 3mg IV BID  -rhythm: sinus   -of note, patient with climbing capture threshold of RV lead; will need to be evaluated in future by EP team   -plan for repeat device interrogation in coming days to reassess with possible EP consult pending findings  -hypertension: continue captopril 6.25mg TID  -delirium precautions    Stable for transfer to the floor    The patient's care was discussed with the Attending Physician, Dr. Lora, Bedside Nurse and Primary team.     Emelyn Meneses MD  [facility]    ______________________________________________________________________    Interval History   Nursing notes reviewed. Overall doing well today. Wants the rectal and feeding tube out. Says he;s ready to \"go to work\"    Data reviewed today: I reviewed all medications, new labs and imaging results over the last 24 hours    Physical Exam   Vital Signs: Temp: 97.6  F (36.4  C) Temp src: Axillary BP: 93/79 Pulse: 108   Resp: 21 SpO2: 100 % O2 Device: Nasal cannula Oxygen Delivery: 4 LPM  Weight: 170 lbs 3.12 oz  General Appearance: Older male in NAD breathing on NC  Respiratory: coarse lung sounds, nonlabored  Cardiovascular: vad hum, driveline site c/d/i, chest incisions c/d/i, JVD ~12 cm   GI: soft, bs active   Skin: warm, well perfused, trace pitting edema to mid shin  Neuro: awake, alert, interactive, pleasantly confused, waving to providers    Data   Recent Labs   Lab 07/27/22  0338 07/27/22  0335 07/27/22  0019 07/26/22  1558 07/26/22  1547 07/26/22  0729 07/26/22  0406 07/25/22  2353 07/25/22  2109 07/25/22  1126 07/25/22  0913 "   WBC 9.1  --   --   --   --   --  15.9*  --  14.9*  --  14.9*   HGB 8.2*  --   --   --   --   --  8.6*  --  8.5*  --  9.2*   MCV 92  --   --   --   --   --  93  --  93  --  92     --   --   --   --   --  372  --  388  --  377   INR 1.39*  --   --   --   --   --  1.31*  --   --   --  1.16*   *  --   --   --  134*  --  135*  --  134*   < > 135*   POTASSIUM 4.1  --   --   --  4.5  --  4.3  --  4.4   < > 3.9   CHLORIDE 92*  --   --   --  95*  --  94*  --  91*   < > 93*   CO2 31*  --   --   --  33*  --  31*  --  33*   < > 31*   BUN 20.2  --   --   --  17.9  --  16.3  --  17.2   < > 18.2   CR 0.70  --   --   --  0.73  --  0.65*  --  0.66*   < > 0.57*   ANIONGAP 9  --   --   --  6*  --  10  --  10   < > 11   ELDA 8.6*  --   --   --  8.2*  --  8.8  --  8.6*   < > 8.5*   * 131* 123*   < > 84   < > 129*   < > 157*   < > 144*   ALBUMIN 2.6*  --   --   --   --   --  2.8*  2.8*  --  2.8*  --   --    PROTTOTAL 5.9*  --   --   --   --   --  6.1*  --  5.8*  --   --    BILITOTAL 0.6  --   --   --   --   --  0.6  --  0.6  --   --    ALKPHOS 225*  --   --   --   --   --  220*  --  234*  --   --    ALT 13  --   --   --   --   --  13  --  12  --   --    AST 32  --   --   --   --   --  33  --  32  --   --     < > = values in this interval not displayed.       Medications     dextrose       EPINEPHrine Stopped (07/26/22 0800)     heparin 1,850 Units/hr (07/27/22 0644)     norepinephrine Stopped (07/26/22 0210)       acetaminophen  975 mg Oral or Feeding Tube Q8H CAMMY     aspirin  81 mg Oral or Feeding Tube Daily     atorvastatin  40 mg Oral QPM     calcium gluconate  1 g Intravenous Once     captopril  6.25 mg Oral TID     digoxin  125 mcg Oral Daily     DULoxetine  30 mg Oral Daily     fiber modular (NUTRISOURCE FIBER)  1 packet Per Feeding Tube TID     fluticasone-salmeterol  2 puff Inhalation BID     [START ON 8/3/2022] fluticasone-vilanterol  1 puff Inhalation Daily     hydroxychloroquine  200 mg Oral BID      insulin aspart  1-12 Units Subcutaneous Q4H     insulin glargine  20 Units Subcutaneous QAM AC     lidocaine  1 patch Transdermal Q24h    And     lidocaine   Transdermal Q8H CAMMY     magnesium sulfate  2 g Intravenous Once     melatonin  10 mg Oral QPM     multivitamins w/minerals  15 mL Per Feeding Tube Daily     OLANZapine  5 mg Oral BID     oxyCODONE  5 mg Oral or Feeding Tube Q6H     pantoprazole  40 mg Per Feeding Tube QAM AC     protein modular  1 packet Per Feeding Tube TID     sodium chloride (PF)  3 mL Intracatheter Q8H     traZODone  25 mg Oral At Bedtime    Or     traZODone  50 mg Oral At Bedtime     Warfarin Therapy Reminder  1 each Oral See Admin Instructions

## 2022-07-27 NOTE — PLAN OF CARE
"Major Shift Events: alert and oriented x4 today. Walked with therapy in hallway. Worsening SOB, team notified and another dose of bumex given. States work of breathing has improved this afternoon. Doppler BP. Rectal tube removed, incontinent of stool x2.   Plan: Waiting for 6C bed.   For vital signs and complete assessments, please see documentation flowsheets.     Problem: Plan of Care - These are the overarching goals to be used throughout the patient stay.    Goal: Plan of Care Review/Shift Note  Description: The Plan of Care Review/Shift note should be completed every shift.  The Outcome Evaluation is a brief statement about your assessment that the patient is improving, declining, or no change.  This information will be displayed automatically on your shift note.  Outcome: Ongoing, Progressing  Goal: Patient-Specific Goal (Individualized)  Description: You can add care plan individualizations to a care plan. Examples of Individualization might be:  \"Parent requests to be called daily at 9am for status\", \"I have a hard time hearing out of my right ear\", or \"Do not touch me to wake me up as it startles me\".  Outcome: Ongoing, Progressing  Goal: Absence of Hospital-Acquired Illness or Injury  Outcome: Ongoing, Progressing  Intervention: Identify and Manage Fall Risk  Recent Flowsheet Documentation  Taken 7/27/2022 1600 by Denise Saravia RN  Safety Promotion/Fall Prevention:   activity supervised   clutter free environment maintained   fall prevention program maintained   increased rounding and observation   mobility aid in reach   nonskid shoes/slippers when out of bed   patient and family education   safety round/check completed   bed alarm on   increase visualization of patient   lighting adjusted   room near nurse's station   room organization consistent   treat reversible contributory factors   treat underlying cause  Taken 7/27/2022 1200 by Denise Saravia, RN  Safety Promotion/Fall Prevention:   activity " supervised   clutter free environment maintained   fall prevention program maintained   increased rounding and observation   mobility aid in reach   nonskid shoes/slippers when out of bed   patient and family education   safety round/check completed   bed alarm on   increase visualization of patient   lighting adjusted   room near nurse's station   room organization consistent   treat reversible contributory factors   treat underlying cause  Taken 7/27/2022 0800 by Denise Saravia RN  Safety Promotion/Fall Prevention:   activity supervised   clutter free environment maintained   fall prevention program maintained   increased rounding and observation   mobility aid in reach   nonskid shoes/slippers when out of bed   patient and family education   safety round/check completed   bed alarm on   increase visualization of patient   lighting adjusted   room near nurse's station   room organization consistent   treat reversible contributory factors   treat underlying cause  Intervention: Prevent Skin Injury  Recent Flowsheet Documentation  Taken 7/27/2022 1600 by Denise Saravia RN  Body Position:   turned   side-lying   right  Skin Protection:   adhesive use limited   incontinence pads utilized   pulse oximeter probe site changed   silicone foam dressing in place   transparent dressing maintained   tubing/devices free from skin contact  Taken 7/27/2022 1200 by Denise Saravia, LITA  Skin Protection:   adhesive use limited   incontinence pads utilized   pulse oximeter probe site changed   silicone foam dressing in place   transparent dressing maintained   tubing/devices free from skin contact  Taken 7/27/2022 0800 by Denise Saravia RN  Skin Protection:   adhesive use limited   incontinence pads utilized   pulse oximeter probe site changed   silicone foam dressing in place   transparent dressing maintained   tubing/devices free from skin contact  Intervention: Prevent and Manage VTE (Venous Thromboembolism) Risk  Recent  Flowsheet Documentation  Taken 7/27/2022 1600 by Denise Saravia RN  VTE Prevention/Management:   SCDs (sequential compression devices) off   patient refused intervention  Activity Management:   activity adjusted per tolerance   up in chair  Taken 7/27/2022 1400 by Denise Saravia RN  Activity Management:   activity adjusted per tolerance   up in chair  Taken 7/27/2022 1200 by Denise Saravia RN  VTE Prevention/Management:   SCDs (sequential compression devices) off   patient refused intervention  Activity Management:   activity adjusted per tolerance   up in chair  Taken 7/27/2022 1000 by Denise Saravia RN  Activity Management: ambulated in [place]  Taken 7/27/2022 0800 by Denise Saravia RN  VTE Prevention/Management:   SCDs (sequential compression devices) off   patient refused intervention  Activity Management:   activity adjusted per tolerance   up in chair  Intervention: Prevent Infection  Recent Flowsheet Documentation  Taken 7/27/2022 1600 by Denise Saravia RN  Infection Prevention:   hand hygiene promoted   personal protective equipment utilized   rest/sleep promoted   single patient room provided   environmental surveillance performed   equipment surfaces disinfected   cohorting utilized  Taken 7/27/2022 1200 by Denise Saravia RN  Infection Prevention:   hand hygiene promoted   personal protective equipment utilized   rest/sleep promoted   single patient room provided   environmental surveillance performed   equipment surfaces disinfected   cohorting utilized  Taken 7/27/2022 0800 by Denise Saravia RN  Infection Prevention:   hand hygiene promoted   personal protective equipment utilized   rest/sleep promoted   single patient room provided   environmental surveillance performed   equipment surfaces disinfected   cohorting utilized  Goal: Optimal Comfort and Wellbeing  Outcome: Ongoing, Progressing  Intervention: Monitor Pain and Promote Comfort  Recent Flowsheet Documentation  Taken 7/27/2022 1600  by Denise Saravia RN  Pain Management Interventions:   rest   repositioned  Taken 7/27/2022 1200 by Denise Saravia RN  Pain Management Interventions:   rest   repositioned  Taken 7/27/2022 0800 by Denise Saravia RN  Pain Management Interventions:   rest   repositioned  Intervention: Provide Person-Centered Care  Recent Flowsheet Documentation  Taken 7/27/2022 1600 by Denise Saravia RN  Trust Relationship/Rapport:   care explained   choices provided   questions answered   questions encouraged   reassurance provided   thoughts/feelings acknowledged   emotional support provided   empathic listening provided  Taken 7/27/2022 1200 by Denise Saravia RN  Trust Relationship/Rapport:   care explained   choices provided   questions answered   questions encouraged   reassurance provided   thoughts/feelings acknowledged   emotional support provided   empathic listening provided  Taken 7/27/2022 0800 by Denise Saravia RN  Trust Relationship/Rapport:   care explained   choices provided   questions answered   questions encouraged   reassurance provided   thoughts/feelings acknowledged   emotional support provided   empathic listening provided  Goal: Readiness for Transition of Care  Outcome: Ongoing, Progressing

## 2022-07-28 ENCOUNTER — APPOINTMENT (OUTPATIENT)
Dept: PHYSICAL THERAPY | Facility: CLINIC | Age: 62
DRG: 001 | End: 2022-07-28
Attending: INTERNAL MEDICINE
Payer: COMMERCIAL

## 2022-07-28 ENCOUNTER — ANCILLARY PROCEDURE (OUTPATIENT)
Dept: CARDIOLOGY | Facility: CLINIC | Age: 62
DRG: 001 | End: 2022-07-28
Attending: STUDENT IN AN ORGANIZED HEALTH CARE EDUCATION/TRAINING PROGRAM
Payer: COMMERCIAL

## 2022-07-28 ENCOUNTER — APPOINTMENT (OUTPATIENT)
Dept: GENERAL RADIOLOGY | Facility: CLINIC | Age: 62
DRG: 001 | End: 2022-07-28
Attending: THORACIC SURGERY (CARDIOTHORACIC VASCULAR SURGERY)
Payer: COMMERCIAL

## 2022-07-28 LAB
ALBUMIN SERPL BCG-MCNC: 2.7 G/DL (ref 3.5–5.2)
ALP SERPL-CCNC: 223 U/L (ref 40–129)
ALT SERPL W P-5'-P-CCNC: 13 U/L (ref 10–50)
ANION GAP SERPL CALCULATED.3IONS-SCNC: 7 MMOL/L (ref 7–15)
AST SERPL W P-5'-P-CCNC: 37 U/L (ref 10–50)
AT III ACT/NOR PPP CHRO: 90 % (ref 85–135)
BACTERIA BLD CULT: NO GROWTH
BILIRUB SERPL-MCNC: 0.5 MG/DL
BUN SERPL-MCNC: 18 MG/DL (ref 8–23)
CA-I BLD-MCNC: 4.4 MG/DL (ref 4.4–5.2)
CALCIUM SERPL-MCNC: 8.4 MG/DL (ref 8.8–10.2)
CHLORIDE SERPL-SCNC: 94 MMOL/L (ref 98–107)
CREAT SERPL-MCNC: 0.58 MG/DL (ref 0.67–1.17)
D DIMER PPP FEU-MCNC: 0.57 UG/ML FEU (ref 0–0.5)
DEPRECATED HCO3 PLAS-SCNC: 33 MMOL/L (ref 22–29)
ERYTHROCYTE [DISTWIDTH] IN BLOOD BY AUTOMATED COUNT: 18.5 % (ref 10–15)
FIBRINOGEN PPP-MCNC: 507 MG/DL (ref 170–490)
GFR SERPL CREATININE-BSD FRML MDRD: >90 ML/MIN/1.73M2
GLUCOSE BLDC GLUCOMTR-MCNC: 118 MG/DL (ref 70–99)
GLUCOSE BLDC GLUCOMTR-MCNC: 124 MG/DL (ref 70–99)
GLUCOSE BLDC GLUCOMTR-MCNC: 131 MG/DL (ref 70–99)
GLUCOSE BLDC GLUCOMTR-MCNC: 139 MG/DL (ref 70–99)
GLUCOSE BLDC GLUCOMTR-MCNC: 164 MG/DL (ref 70–99)
GLUCOSE SERPL-MCNC: 119 MG/DL (ref 70–99)
HCT VFR BLD AUTO: 22.9 % (ref 40–53)
HGB BLD-MCNC: 7.1 G/DL (ref 13.3–17.7)
HGB BLD-MCNC: 8.5 G/DL (ref 13.3–17.7)
INR PPP: 1.29 (ref 0.85–1.15)
LACTATE SERPL-SCNC: 1.3 MMOL/L (ref 0.7–2)
MAGNESIUM SERPL-MCNC: 2.2 MG/DL (ref 1.7–2.3)
MAGNESIUM SERPL-MCNC: 2.4 MG/DL (ref 1.7–2.3)
MCH RBC QN AUTO: 29.3 PG (ref 26.5–33)
MCHC RBC AUTO-ENTMCNC: 31 G/DL (ref 31.5–36.5)
MCV RBC AUTO: 95 FL (ref 78–100)
PHOSPHATE SERPL-MCNC: 4.1 MG/DL (ref 2.5–4.5)
PHOSPHATE SERPL-MCNC: 4.1 MG/DL (ref 2.5–4.5)
PLATELET # BLD AUTO: 348 10E3/UL (ref 150–450)
POTASSIUM SERPL-SCNC: 4.1 MMOL/L (ref 3.4–5.3)
POTASSIUM SERPL-SCNC: 4.7 MMOL/L (ref 3.4–5.3)
PROT SERPL-MCNC: 6.2 G/DL (ref 6.4–8.3)
RBC # BLD AUTO: 2.42 10E6/UL (ref 4.4–5.9)
SODIUM SERPL-SCNC: 134 MMOL/L (ref 136–145)
UFH PPP CHRO-ACNC: 0.33 IU/ML
WBC # BLD AUTO: 9.9 10E3/UL (ref 4–11)

## 2022-07-28 PROCEDURE — 250N000013 HC RX MED GY IP 250 OP 250 PS 637: Performed by: STUDENT IN AN ORGANIZED HEALTH CARE EDUCATION/TRAINING PROGRAM

## 2022-07-28 PROCEDURE — 87040 BLOOD CULTURE FOR BACTERIA: CPT | Performed by: SURGERY

## 2022-07-28 PROCEDURE — 36415 COLL VENOUS BLD VENIPUNCTURE: CPT | Performed by: SURGERY

## 2022-07-28 PROCEDURE — 250N000013 HC RX MED GY IP 250 OP 250 PS 637: Performed by: SURGERY

## 2022-07-28 PROCEDURE — 250N000011 HC RX IP 250 OP 636: Performed by: STUDENT IN AN ORGANIZED HEALTH CARE EDUCATION/TRAINING PROGRAM

## 2022-07-28 PROCEDURE — 250N000011 HC RX IP 250 OP 636: Performed by: SURGERY

## 2022-07-28 PROCEDURE — 80053 COMPREHEN METABOLIC PANEL: CPT | Performed by: SURGERY

## 2022-07-28 PROCEDURE — 85379 FIBRIN DEGRADATION QUANT: CPT | Performed by: SURGERY

## 2022-07-28 PROCEDURE — 250N000013 HC RX MED GY IP 250 OP 250 PS 637: Performed by: INTERNAL MEDICINE

## 2022-07-28 PROCEDURE — 85520 HEPARIN ASSAY: CPT | Performed by: THORACIC SURGERY (CARDIOTHORACIC VASCULAR SURGERY)

## 2022-07-28 PROCEDURE — 83735 ASSAY OF MAGNESIUM: CPT | Performed by: SURGERY

## 2022-07-28 PROCEDURE — 71045 X-RAY EXAM CHEST 1 VIEW: CPT

## 2022-07-28 PROCEDURE — 85027 COMPLETE CBC AUTOMATED: CPT | Performed by: SURGERY

## 2022-07-28 PROCEDURE — 84100 ASSAY OF PHOSPHORUS: CPT | Performed by: SURGERY

## 2022-07-28 PROCEDURE — 85610 PROTHROMBIN TIME: CPT | Performed by: SURGERY

## 2022-07-28 PROCEDURE — 87075 CULTR BACTERIA EXCEPT BLOOD: CPT | Performed by: STUDENT IN AN ORGANIZED HEALTH CARE EDUCATION/TRAINING PROGRAM

## 2022-07-28 PROCEDURE — 85300 ANTITHROMBIN III ACTIVITY: CPT | Performed by: SURGERY

## 2022-07-28 PROCEDURE — 93283 PRGRMG EVAL IMPLANTABLE DFB: CPT

## 2022-07-28 PROCEDURE — 83605 ASSAY OF LACTIC ACID: CPT | Performed by: SURGERY

## 2022-07-28 PROCEDURE — 250N000013 HC RX MED GY IP 250 OP 250 PS 637: Performed by: THORACIC SURGERY (CARDIOTHORACIC VASCULAR SURGERY)

## 2022-07-28 PROCEDURE — 85018 HEMOGLOBIN: CPT | Performed by: PHYSICIAN ASSISTANT

## 2022-07-28 PROCEDURE — 97530 THERAPEUTIC ACTIVITIES: CPT | Mod: GP

## 2022-07-28 PROCEDURE — 120N000003 HC R&B IMCU UMMC

## 2022-07-28 PROCEDURE — 82330 ASSAY OF CALCIUM: CPT | Performed by: SURGERY

## 2022-07-28 PROCEDURE — 250N000013 HC RX MED GY IP 250 OP 250 PS 637: Performed by: ANESTHESIOLOGY

## 2022-07-28 PROCEDURE — 999N000015 HC STATISTIC ARTERIAL MONITORING DAILY

## 2022-07-28 PROCEDURE — 71045 X-RAY EXAM CHEST 1 VIEW: CPT | Mod: 26 | Performed by: RADIOLOGY

## 2022-07-28 PROCEDURE — 99291 CRITICAL CARE FIRST HOUR: CPT | Mod: 25 | Performed by: INTERNAL MEDICINE

## 2022-07-28 PROCEDURE — 84132 ASSAY OF SERUM POTASSIUM: CPT | Performed by: SURGERY

## 2022-07-28 PROCEDURE — 93283 PRGRMG EVAL IMPLANTABLE DFB: CPT | Mod: 26 | Performed by: INTERNAL MEDICINE

## 2022-07-28 PROCEDURE — 87070 CULTURE OTHR SPECIMN AEROBIC: CPT | Performed by: STUDENT IN AN ORGANIZED HEALTH CARE EDUCATION/TRAINING PROGRAM

## 2022-07-28 PROCEDURE — 999N000155 HC STATISTIC RAPCV CVP MONITORING

## 2022-07-28 PROCEDURE — 85384 FIBRINOGEN ACTIVITY: CPT | Performed by: SURGERY

## 2022-07-28 PROCEDURE — 97116 GAIT TRAINING THERAPY: CPT | Mod: GP

## 2022-07-28 RX ORDER — LOPERAMIDE HCL 1 MG/7.5ML
2 SUSPENSION ORAL 4 TIMES DAILY PRN
Status: DISCONTINUED | OUTPATIENT
Start: 2022-07-28 | End: 2022-08-21 | Stop reason: HOSPADM

## 2022-07-28 RX ORDER — WARFARIN SODIUM 7.5 MG/1
7.5 TABLET ORAL
Status: COMPLETED | OUTPATIENT
Start: 2022-07-28 | End: 2022-07-28

## 2022-07-28 RX ADMIN — ACETAMINOPHEN 975 MG: 325 TABLET, FILM COATED ORAL at 22:21

## 2022-07-28 RX ADMIN — BUMETANIDE 3 MG: 0.25 INJECTION, SOLUTION INTRAMUSCULAR; INTRAVENOUS at 16:02

## 2022-07-28 RX ADMIN — ATORVASTATIN CALCIUM 40 MG: 40 TABLET, FILM COATED ORAL at 19:22

## 2022-07-28 RX ADMIN — MULTIVITAMIN 15 ML: LIQUID ORAL at 07:39

## 2022-07-28 RX ADMIN — HYDROXYZINE HYDROCHLORIDE 50 MG: 25 TABLET ORAL at 05:47

## 2022-07-28 RX ADMIN — Medication 6.25 MG: at 08:39

## 2022-07-28 RX ADMIN — FLUTICASONE PROPIONATE AND SALMETEROL XINAFOATE 2 PUFF: 115; 21 AEROSOL, METERED RESPIRATORY (INHALATION) at 20:48

## 2022-07-28 RX ADMIN — Medication 40 MG: at 08:39

## 2022-07-28 RX ADMIN — DULOXETINE 30 MG: 30 CAPSULE, DELAYED RELEASE ORAL at 07:39

## 2022-07-28 RX ADMIN — ONDANSETRON 4 MG: 2 INJECTION INTRAMUSCULAR; INTRAVENOUS at 07:54

## 2022-07-28 RX ADMIN — Medication 6.25 MG: at 14:36

## 2022-07-28 RX ADMIN — INSULIN GLARGINE 20 UNITS: 100 INJECTION, SOLUTION SUBCUTANEOUS at 08:01

## 2022-07-28 RX ADMIN — HYDROXYCHLOROQUINE SULFATE 200 MG: 200 TABLET, FILM COATED ORAL at 07:39

## 2022-07-28 RX ADMIN — FLUTICASONE PROPIONATE AND SALMETEROL XINAFOATE 2 PUFF: 115; 21 AEROSOL, METERED RESPIRATORY (INHALATION) at 07:39

## 2022-07-28 RX ADMIN — LOPERAMIDE HCL 2 MG: 1 SOLUTION ORAL at 03:54

## 2022-07-28 RX ADMIN — INSULIN ASPART 1 UNITS: 100 INJECTION, SOLUTION INTRAVENOUS; SUBCUTANEOUS at 08:01

## 2022-07-28 RX ADMIN — Medication 1 PACKET: at 07:38

## 2022-07-28 RX ADMIN — ASPIRIN 81 MG CHEWABLE TABLET 81 MG: 81 TABLET CHEWABLE at 07:39

## 2022-07-28 RX ADMIN — Medication 6.25 MG: at 20:48

## 2022-07-28 RX ADMIN — BUMETANIDE 3 MG: 0.25 INJECTION, SOLUTION INTRAMUSCULAR; INTRAVENOUS at 07:39

## 2022-07-28 RX ADMIN — OXYCODONE HYDROCHLORIDE 5 MG: 5 TABLET ORAL at 12:07

## 2022-07-28 RX ADMIN — DIGOXIN 125 MCG: 125 TABLET ORAL at 07:38

## 2022-07-28 RX ADMIN — Medication 10 MG: at 22:21

## 2022-07-28 RX ADMIN — TRAZODONE HYDROCHLORIDE 50 MG: 50 TABLET ORAL at 22:22

## 2022-07-28 RX ADMIN — Medication 1 PACKET: at 14:35

## 2022-07-28 RX ADMIN — OLANZAPINE 5 MG: 5 TABLET, FILM COATED ORAL at 07:39

## 2022-07-28 RX ADMIN — WARFARIN SODIUM 7.5 MG: 7.5 TABLET ORAL at 18:16

## 2022-07-28 RX ADMIN — ACETAMINOPHEN 975 MG: 325 TABLET, FILM COATED ORAL at 14:35

## 2022-07-28 RX ADMIN — ACETAMINOPHEN 975 MG: 325 TABLET, FILM COATED ORAL at 05:49

## 2022-07-28 RX ADMIN — Medication 1 PACKET: at 20:48

## 2022-07-28 RX ADMIN — HEPARIN SODIUM AND DEXTROSE 1750 UNITS/HR: 10000; 5 INJECTION INTRAVENOUS at 12:07

## 2022-07-28 RX ADMIN — HYDROXYCHLOROQUINE SULFATE 200 MG: 200 TABLET, FILM COATED ORAL at 19:23

## 2022-07-28 RX ADMIN — OXYCODONE HYDROCHLORIDE 5 MG: 5 TABLET ORAL at 19:22

## 2022-07-28 RX ADMIN — OXYCODONE HYDROCHLORIDE 5 MG: 5 TABLET ORAL at 03:54

## 2022-07-28 RX ADMIN — OLANZAPINE 5 MG: 5 TABLET, FILM COATED ORAL at 22:21

## 2022-07-28 ASSESSMENT — ACTIVITIES OF DAILY LIVING (ADL)
ADLS_ACUITY_SCORE: 36
ADLS_ACUITY_SCORE: 40
ADLS_ACUITY_SCORE: 36

## 2022-07-28 NOTE — PROGRESS NOTES
[facility]    Cardiology Progress Note- Cards 2        Date of Admission:  6/17/2022     Assessment & Plan: HVSL   Dandy EDUARD Sands is a 61 year old male with PMH of lupus, antiphospholipid syndrome, HTN, HLD, HFrEF 2/2 ICM who presented with cardiogenic shock c/b multiorgan failure (JOSE, shock liver) requiring mechanical support with IABP, now s/p HM3 LVAD 7/8/22 by Dr. Zamora with intraoperative course c/b RV failure s/p 29F Protek duo via RIJ. Post op course c/b hemopericardium s/p repeat washout with chest left open. Chest closed 7/13/22 and decannulated from RVAD 7/21    #HFrEF 2/2 ICM s/p HM3 LVAD in setting of cardiogenic shock (SCAI D)  #RV failure s/p Protek duo temporary RVAD s/p decannulation 7/21  #RV thrombus  #Post chest closure hemopericardium s/p repeat washout (7/12)  Patient with ICM s/p HM3 LVAD 7/8/22 by Dr. Zamora in setting of presentation for cardiogenic shock requiring MCS with IABP as prior to HM (initially evaluated for heart transplant, however noted to have substantially elevated DSAs during course of care, so decision made to proceed with LVAD given patient already on temporary MCS). Intraoperative course c/b RV failure resulting in cardiogenic shock requiring high dose pressors necessitating RVAD support. Chest closed 7/11 and Ashli weaned. However developed tachycardia, lactic acidosis and decreased hemoglobin with CT scan noting hemopericardium. Returned to OR where underwent washout with large clot burden noted over the right atrium and around LVAD outflow graft. Chest left open and returned to ICU where pressors were able to be weaned. Noted to have stable RVAD/LVAD flows throughout and post procedurally. Ultimately returned to OR for repeat closure. Currently hemodynamically stable with stable end-organ function and hemoglobin. Extubated and decannulated from RVAD on 7/21 with good tolerance from hemodynamic profile. Will continue  to monitor closely to optimize LVAD flows and end organ function.   -LVAD: continues to have good flows, no alarms and stable end organ perfusion; continue speed at 5300  -continue digoxin for RV support  -AC, antiplatelets: continue ASA; heparin high intensity for RV thrombus; warfarin started and awaiting therapeutic level  -Volume status: appears slightly hypervolemic; bumex 3mg IV BID with goal negative 500cc to 1L  -rhythm: sinus   -of note, patient with climbing capture threshold of RV lead; will need to be evaluated in future by EP team   -plan for repeat device interrogation 7/28 to reassess with possible EP consult pending findings  -hypertension: continue captopril 6.25mg TID  -encourage ICS, pulmonary toilet  -delirium precautions    Stable for transfer to the floor    The patient's care was discussed with the Attending Physician, Dr. Faustin, Bedside Nurse and Primary team.     Emelyn Meneses MD  [facility]    ______________________________________________________________________    Interval History   Nursing notes reviewed. BRAXTON. This AM, reports a little nausea as well as intermittent difficulty with breathing, but otherwise is doing ok.     Data reviewed today: I reviewed all medications, new labs and imaging results over the last 24 hours    Physical Exam   Vital Signs: Temp: 97.9  F (36.6  C) Temp src: Oral BP: 127/69 Pulse: 106   Resp: 28 SpO2: 97 % O2 Device: Nasal cannula Oxygen Delivery: 4 LPM  Weight: 160 lbs .86 oz  General Appearance: Older male in NAD breathing on NC  Respiratory: coarse lung sounds, nonlabored  Cardiovascular: vad hum, driveline site c/d/i, chest incisions c/d/i, JVD ~10 cm   GI: soft, bs active, slightly distended  Skin: warm, well perfused, trace diffuse edema  Neuro: awake, alert, interactive, speech fluent, moving all 4    Data   Recent Labs   Lab 07/28/22  0419 07/28/22  0415 07/27/22  2304 07/27/22 2035  07/27/22  1610 07/27/22  1145 07/27/22  0806 07/27/22  0338 07/26/22  1558 07/26/22  1547 07/26/22  0729 07/26/22  0406   WBC 9.9  --   --   --   --   --   --  9.1  --   --   --  15.9*   HGB 7.1*  --   --   --   --   --   --  8.2*  --   --   --  8.6*   MCV 95  --   --   --   --   --   --  92  --   --   --  93     --   --   --   --   --   --  311  --   --   --  372   INR 1.29*  --   --   --   --   --   --  1.39*  --   --   --  1.31*   *  --   --   --   --   --   --  132*  --  134*  --  135*   POTASSIUM 4.7  --   --  3.7  --  4.1  --  4.1  --  4.5  --  4.3   CHLORIDE 94*  --   --   --   --   --   --  92*  --  95*  --  94*   CO2 33*  --   --   --   --   --   --  31*  --  33*  --  31*   BUN 18.0  --   --   --   --   --   --  20.2  --  17.9  --  16.3   CR 0.58*  --   --   --   --   --   --  0.70  --  0.73  --  0.65*   ANIONGAP 7  --   --   --   --   --   --  9  --  6*  --  10   ELDA 8.4*  --   --   --   --   --   --  8.6*  --  8.2*  --  8.8   * 124* 99 110*   < >  --    < > 135*   < > 84   < > 129*   ALBUMIN 2.7*  --   --   --   --   --   --  2.6*  --   --   --  2.8*  2.8*   PROTTOTAL 6.2*  --   --   --   --   --   --  5.9*  --   --   --  6.1*   BILITOTAL 0.5  --   --   --   --   --   --  0.6  --   --   --  0.6   ALKPHOS 223*  --   --   --   --   --   --  225*  --   --   --  220*   ALT 13  --   --   --   --   --   --  13  --   --   --  13   AST 37  --   --   --   --   --   --  32  --   --   --  33    < > = values in this interval not displayed.       Medications     dextrose       heparin 1,750 Units/hr (07/28/22 0700)       acetaminophen  975 mg Oral or Feeding Tube Q8H CAMMY     aspirin  81 mg Oral or Feeding Tube Daily     atorvastatin  40 mg Oral QPM     bumetanide  3 mg Intravenous BID     captopril  6.25 mg Oral TID     digoxin  125 mcg Oral Daily     DULoxetine  30 mg Oral Daily     fiber modular (NUTRISOURCE FIBER)  1 packet Per Feeding Tube TID     fluticasone-salmeterol  2 puff Inhalation BID      [START ON 8/3/2022] fluticasone-vilanterol  1 puff Inhalation Daily     hydroxychloroquine  200 mg Oral BID     insulin aspart  1-12 Units Subcutaneous Q4H     insulin glargine  20 Units Subcutaneous QAM AC     lidocaine  1 patch Transdermal Q24h    And     lidocaine   Transdermal Q8H CAMMY     melatonin  10 mg Oral QPM     multivitamins w/minerals  15 mL Per Feeding Tube Daily     OLANZapine  5 mg Oral BID     oxyCODONE  5 mg Oral or Feeding Tube Q8H     pantoprazole  40 mg Per Feeding Tube QAM AC     protein modular  1 packet Per Feeding Tube TID     sodium chloride (PF)  3 mL Intracatheter Q8H     traZODone  25 mg Oral At Bedtime    Or     traZODone  50 mg Oral At Bedtime     Warfarin Therapy Reminder  1 each Oral See Admin Instructions

## 2022-07-28 NOTE — PROGRESS NOTES
[facility] ICU PROGRESS NOTE  07/28/2022        CO-MORBIDITIES  1. Cardiogenic shock (H)  (primary encounter diagnosis)  2. Ischemic cardiomyopathy  3. Heart failure with reduced ejection fraction, NYHA class III (H)  4. Coronary artery disease involving native coronary artery of native heart without angina pectoris      ASSESSMENT Dandy Sands is a 61 year old male with a PMH of CAD s/p PCI to LAD, MI, ICM, acute on chronic heart failure, s/p CRT-D, severe MR, antiphospholipid antibody syndrome on chronic warfarin, SLE, HTN, HLD, recent hospitalization 5/16-5/26 s/p RCA, LAD stenting who underwent RVAD (29F Protek duo RIJ) LVAD placement (HeartMate 3) on 7/8/22 by Dr. Zamora. Intraoperative course complicated by IABP dysfunction and RV failure, requiring crash onto bypass / vasopressor support, NO, and protek placement. Now s/p chest closure and NO wean 7/11. Return to OR for hemopericardium (7/12) and chest re-closure 7/13. Protek RVAD removed 7/21.      PLAN:   Neuro/ pain/ sedation:  #Acute Postoperative pain  #Depression   #Anxiety due to a medical condition  #Insomnia  #Delerium   Delirious intermittently.   - Monitor neurological status. Notify the MD for any acute changes in exam.  - Pain:    -Scheduled tylenol, uriel oxy 5mg q8h, wean off scheduled Oxy 7/29   -PRN oxycodone 5mg q6h  - Depression: Duloxetine 30mg  - Sleep: Melatonin 10 mg HS   - discontinue Seroquel 100 mg HS   - start trazodone    - Zyprexa 5mg BID  - Anxiety: Valproate po 250 mg BID and 500mg Bedtime   - Hydroxyzine PRN  - PTA meds: hydroxyzine 25mg PRN, zolpidem 5mg, melatonin 3mg, lorazepam 0.5mg  - QTc(7/18)- 683 msec, no haldol     Pulmonary:   #Acute hypoxic respiratory failure on iMV  #Mild-moderate emphysema  #History of COVID-19  Nasal cannula 4L   - Maintain SpO2> 92%  - Incentive spirometry     Cardiovascular:    #S/p LVAD placement (HeartMate 3) on 7/8/22   #S/p RVAD (29F Protek duo RIJ) on 7/8/22  #S/p chest closure  7/11  #Cardiogenic shock  #Advanced ischemic cardiomyopathy, class IV, stage D  #CAD s/p PCI to LAD (2005)  #S/p CRT-D (2006)  #History of MI (2007)  #Severe MR  #Antiphospholipid antibody syndrome on chronic warfarin  #HTN, HLD  #Recent hospitalization 5/16-5/26 s/p RCA, LAD stenting  #Atrial fibrillation   # RV mobile clot   - Monitor hemodynamic status. Chest closure and oxygenator splicing 7/11. Reopened 7/12 for I&D and left open, closed 7/13. RVAD removed 7/21.  - Goal MAP > 65   - PTA meds(held): clopidogrel 75mg, lasix 40mg, warfarin 5mg  - LVAD management per Advanced HF service  - AC Plan: High intensity Heparin for RV mobile thrombus  - Aspirin 81 mg  - Atorvastatin 40mg   - Hydralazine uriel and prn MAP >85 discontinued  - Captopril 12.5mg TID dose titration per cardiology   - Warfarin 7/24 per pharmacy    GI / Nutrition:   #GERD  #Congestive hepatopathy in the setting of cardiac insuffiencey -resolved  #Hematemesis / oral mucosal bleeding -resolved    - Full liquid diet, SLP following  - Continue NJT at goal   - Nutrition following  - Bowel regimen prn (miralax / senna)  - Trend LFT's every other day    Renal/ Fluid Balance:    - Strict I/O, daily weights   - Avoid/limit nephrotoxins as able  - Replete lytes PRN per protocol  - PTA meds: tamsulosin 0.4mg (held)  - Goal net negative ~ 1L   Bumex gtt for part of 7/24: total UOP 8L, gtt stopped   Bumex 4mg x2 7/25 with over 6L UOP  - Bumex 3mg x3 7/27 continue bumex 3 mg IV BID today per cards, already net negative 1L today    Endocrine:    #Stress induced hyperglycemia  Preop A1c 5.5%  - Insulin glargine 20U and high sliding scale  - Goal BG <180 for optimal healing   - Holding glargine 7/29 in anticipation of possibly cycling tube feeding 7/29 depending on how patient eats today (7/28)    ID/ Antibiotics:  #Periopearive antibiosis (s/p course of Cefepime, vancomycin, rifampin, fluconazole)  #HAP - Enterococcus faecalis PNA, s/p treatment   - 7/12 Pan  culture: Bcx x2, UA/UC -> negative  - 7/14 Endotracheal sputum culture (4+ candida albicans, 1+ E.Coli, 3+ enterococcus faecalis)   - Zosyn for HAP(7/15-7/22)  - 7/18 C.diff- negative  - 7/25 Blood cultures drawn for unexplained hypotension   - Pending results     Heme:     #Acute blood loss anemia  #Acute blood loss thrombocytopenia  #History of DVT and PE   #Antiphospholipid antibody syndrome  #History of iron deficiency anemia  - Monitor for s/sx of bleeding  - Trend CBC and coags  - Hgb goal >8   Hgb 8.6->7.1; recheck hgb this afternoon, no signs or symptoms of bleeding  - Plt goal > 20    Rheumatology:  #SLE  - Chronic: symptoms include arthritis, photosensitivity, fatigue, and skin manifestations  - PTA meds: hydroxychloroquine 200mg, resumed 7/16  - Rheumatology consulted and following. Follow dsDNA and complement levels(7/18)    -Will need cellcept restarted when appropriate.    Prophylaxis:    - DVT: SCD + high intensity heparin + warfarin   - PPI    Disposition:  - Transfer to floor     Patient seen, findings and plan discussed with CV ICU staff.    Grant Gomez PA-C  Cardiothoracic Surgery  p: [phone]  ----------------------------------------------------------------    Interval Events:  No acute events. Feels his breathing is improved today, nursing reports he appears slightly more short of breath getting from bed to chair. Stable on 4lpm. Pain controlled.     OBJECTIVE:   1. VITAL SIGNS:   Temp:  [97.7  F (36.5  C)-98.3  F (36.8  C)] 98.1  F (36.7  C)  Pulse:  [101-115] 110  Resp:  [10-39] 27  BP: ()/(63-73) 127/69  MAP:  [88 mmHg-99 mmHg] 96 mmHg  Arterial Line BP: ()/(77-90) 103/87  SpO2:  [82 %-100 %] 99 %  Resp: 27      2. INTAKE/ OUTPUT:   I/O last 3 completed shifts:  In: 2413.25 [P.O.:225; I.V.:553.25; NG/GT:485]  Out: 4635 [Urine:4485; Stool:150]    3. PHYSICAL EXAMINATION:   General: sitting in chair, comfortable  Neuro: follow commands, intermittent delerium  Resp: on 4L  NC  CV: tachycardic   Abdomen: Soft, mildly distended, Non-tender  Incisions: c/d/i  Extremities: warm and well perfused    4. INVESTIGATIONS:   Arterial Blood Gases   Recent Labs   Lab 07/25/22  2109 07/25/22  0914 07/23/22  1419 07/23/22  1224   PH 7.50* 7.50* 7.47* 7.40   PCO2 46* 46* 39 43   PO2 140* 91 178* 137*   HCO3 36* 35* 29* 27     Complete Blood Count   Recent Labs   Lab 07/28/22 0419 07/27/22  0338 07/26/22  0406 07/25/22  2109   WBC 9.9 9.1 15.9* 14.9*   HGB 7.1* 8.2* 8.6* 8.5*    311 372 388     Basic Metabolic Panel  Recent Labs   Lab 07/28/22  0800 07/28/22  0419 07/28/22  0415 07/27/22  2304 07/27/22  2035 07/27/22  1610 07/27/22  1145 07/27/22  0806 07/27/22  0338 07/26/22  1558 07/26/22  1547 07/26/22  0729 07/26/22  0406   NA  --  134*  --   --   --   --   --   --  132*  --  134*  --  135*   POTASSIUM  --  4.7  --   --  3.7  --  4.1  --  4.1  --  4.5  --  4.3   CHLORIDE  --  94*  --   --   --   --   --   --  92*  --  95*  --  94*   CO2  --  33*  --   --   --   --   --   --  31*  --  33*  --  31*   BUN  --  18.0  --   --   --   --   --   --  20.2  --  17.9  --  16.3   CR  --  0.58*  --   --   --   --   --   --  0.70  --  0.73  --  0.65*   * 119* 124* 99 110*   < >  --    < > 135*   < > 84   < > 129*    < > = values in this interval not displayed.     Liver Function Tests  Recent Labs   Lab 07/28/22 0419 07/27/22  0338 07/26/22  0406 07/25/22  2109 07/25/22  0913   AST 37 32 33 32  --    ALT 13 13 13 12  --    ALKPHOS 223* 225* 220* 234*  --    BILITOTAL 0.5 0.6 0.6 0.6  --    ALBUMIN 2.7* 2.6* 2.8*  2.8* 2.8*  --    INR 1.29* 1.39* 1.31*  --  1.16*     Pancreatic Enzymes  No lab results found in last 7 days.  Coagulation Profile  Recent Labs   Lab 07/28/22  0419 07/27/22  0338 07/26/22  0406 07/25/22  0913 07/24/22  1639 07/24/22  0953 07/24/22  0405 07/23/22  2124 07/23/22  1617   INR 1.29* 1.39* 1.31* 1.16*   < > 1.16* 1.22* 1.23* 1.19*   PTT  --   --   --   --   --  100* >240*  105* 74*    < > = values in this interval not displayed.         5. RADIOLOGY:   Recent Results (from the past 24 hour(s))   XR Chest Port 1 View    Narrative    XR CHEST PORT 1 VIEW  7/28/2022 1:15 AM      HISTORY: LVAD    COMPARISON: Chest x-ray 7/27/2022, 7/25/2022.    FINDINGS:   Portable AP 30 degree upright chest x-ray. Left chest wall ICD with  leads terminating over the right atrium and right ventricle.  Postsurgical changes of the chest. Median sternotomy wires are intact.  LVAD. Right arm PICC retracted with tip terminating over the  brachiocephalic confluence. Enteric tube projects below the margin of  the film.    Trachea is midline. Cardiac silhouette is enlarged. Pulmonary  vasculature is indistinct. Increased bilateral mixed interstitial and  airspace opacities. Small right greater than left pleural effusions,  increased. No pneumothorax.    Visualized osseous structures and soft tissues are unremarkable.      Impression    IMPRESSION:   1.  Cardiomegaly with increased bilateral mixed interstitial and  airspace opacities suggestive for worsening pulmonary edema.  2.  Increased small right greater than left pleural effusions.  3.  Retracted right arm PICC with tip terminating over the  brachiocephalic confluence. Additional support devices in stable  position.       =========================================

## 2022-07-28 NOTE — PLAN OF CARE
Major Shift Events:  Patient Oriented this shift, slept overnight intermittently. Calm and Cooperative with Staff. Good Urine Output. Frequent loose stools, Imodium PRN ordered and given by RN.    Plan: Floor Orders. Notify Primary Team of any changes.    For vital signs and complete assessments, please see documentation flowsheets.     Goal Outcome Evaluation:    Plan of Care Reviewed With: patient, son     Overall Patient Progress: improving    Outcome Evaluation: Pt eating minimally, diet advanced to Regular. Pt oriented at this time,

## 2022-07-28 NOTE — PROGRESS NOTES
"CLINICAL NUTRITION SERVICES - BRIEF NOTE      Reason for RD note: Bedside RN inquiring about cycled feeds to support improved appetite/PO.    New Findings/Chart Review:  TF at goal rate. Pt not really taking supplements per RN. Pt ordered a cheeseburger for lunch which arrived during writer's visit.     Interventions:  Bedside RN notified writer that pt's appetite is much better today and RN inquired about cycled feeds. Writer agrees this could be a good idea, pending Lantus/insulin needs. Followed up with bedside RN for plan to continue continuous TF regimen for now per CV[facility].     Spoke to [facility] PA-C over phone. Given Lantus and unknown insulin needs with pt not taking much PO this admit, will defer cycling feeds today. Plan is to reassess tomorrow when able to better parse out BG/insulin needs if pt starts taking more PO.    Met with pt at bedside to double check on supplements - pt dislikes Gelatein (describes as \"paste\"), but enjoys chocolate Ensure and Special K protein bars.    Updated supplements:  -Discontinued Gelatein   -Chocolate Ensure Enlive/High Protein @ 10am & 2pm  -Continue Special K bar @ 8pm    Ordered kcal cts 7/29-7/31    Future/Additional Recommendations:  Monitor PO/supplement use. If appropriate for cycled feeds, rec trialing: Osmolite 1.5 Virgilio @ 75 ml/hr x 16 hours (1200ml/day) + 1 pkt Prosource TID will provide: 1920 kcals (30 kcal/kg), 108 g PRO (1.7 g/kg), 914 ml free H20, 244 g CHO, and 0 g fiber daily per new dosing wt of 63 kg.   --Meets full nutrition needs d/t pt severely malnourished    Recommend pt on average meets at least 75% minimum assessed needs (~1200 kcal, 70 g pro) daily prior to removal of FT.     Nutrition will continue to follow per protocol.    Erica Maguire RD, LD  Pager: [phone]  "

## 2022-07-29 ENCOUNTER — APPOINTMENT (OUTPATIENT)
Dept: SPEECH THERAPY | Facility: CLINIC | Age: 62
DRG: 001 | End: 2022-07-29
Attending: INTERNAL MEDICINE
Payer: COMMERCIAL

## 2022-07-29 ENCOUNTER — APPOINTMENT (OUTPATIENT)
Dept: PHYSICAL THERAPY | Facility: CLINIC | Age: 62
DRG: 001 | End: 2022-07-29
Attending: INTERNAL MEDICINE
Payer: COMMERCIAL

## 2022-07-29 ENCOUNTER — APPOINTMENT (OUTPATIENT)
Dept: OCCUPATIONAL THERAPY | Facility: CLINIC | Age: 62
DRG: 001 | End: 2022-07-29
Attending: INTERNAL MEDICINE
Payer: COMMERCIAL

## 2022-07-29 LAB
ALBUMIN SERPL BCG-MCNC: 2.9 G/DL (ref 3.5–5.2)
ALP SERPL-CCNC: 230 U/L (ref 40–129)
ALT SERPL W P-5'-P-CCNC: 13 U/L (ref 10–50)
ANION GAP SERPL CALCULATED.3IONS-SCNC: 10 MMOL/L (ref 7–15)
AST SERPL W P-5'-P-CCNC: 34 U/L (ref 10–50)
AT III ACT/NOR PPP CHRO: 90 % (ref 85–135)
BACTERIA BLD CULT: NO GROWTH
BILIRUB SERPL-MCNC: 0.5 MG/DL
BUN SERPL-MCNC: 16.9 MG/DL (ref 8–23)
CA-I BLD-MCNC: 4.4 MG/DL (ref 4.4–5.2)
CALCIUM SERPL-MCNC: 8.8 MG/DL (ref 8.8–10.2)
CHLORIDE SERPL-SCNC: 92 MMOL/L (ref 98–107)
CREAT SERPL-MCNC: 0.58 MG/DL (ref 0.67–1.17)
D DIMER PPP FEU-MCNC: 7.75 UG/ML FEU (ref 0–0.5)
DEPRECATED HCO3 PLAS-SCNC: 33 MMOL/L (ref 22–29)
ERYTHROCYTE [DISTWIDTH] IN BLOOD BY AUTOMATED COUNT: 18.6 % (ref 10–15)
FIBRINOGEN PPP-MCNC: 534 MG/DL (ref 170–490)
GFR SERPL CREATININE-BSD FRML MDRD: >90 ML/MIN/1.73M2
GLUCOSE BLDC GLUCOMTR-MCNC: 109 MG/DL (ref 70–99)
GLUCOSE BLDC GLUCOMTR-MCNC: 123 MG/DL (ref 70–99)
GLUCOSE BLDC GLUCOMTR-MCNC: 134 MG/DL (ref 70–99)
GLUCOSE BLDC GLUCOMTR-MCNC: 137 MG/DL (ref 70–99)
GLUCOSE BLDC GLUCOMTR-MCNC: 149 MG/DL (ref 70–99)
GLUCOSE BLDC GLUCOMTR-MCNC: 150 MG/DL (ref 70–99)
GLUCOSE SERPL-MCNC: 115 MG/DL (ref 70–99)
HCT VFR BLD AUTO: 27.6 % (ref 40–53)
HGB BLD-MCNC: 8.4 G/DL (ref 13.3–17.7)
INR PPP: 1.37 (ref 0.85–1.15)
LACTATE SERPL-SCNC: 1.3 MMOL/L (ref 0.7–2)
MAGNESIUM SERPL-MCNC: 2.2 MG/DL (ref 1.7–2.3)
MCH RBC QN AUTO: 29 PG (ref 26.5–33)
MCHC RBC AUTO-ENTMCNC: 30.4 G/DL (ref 31.5–36.5)
MCV RBC AUTO: 95 FL (ref 78–100)
MDC_IDC_EPISODE_DTM: NORMAL
MDC_IDC_EPISODE_DURATION: NORMAL S
MDC_IDC_EPISODE_ID: 57
MDC_IDC_EPISODE_TYPE: NORMAL
MDC_IDC_LEAD_IMPLANT_DT: NORMAL
MDC_IDC_LEAD_IMPLANT_DT: NORMAL
MDC_IDC_LEAD_LOCATION: NORMAL
MDC_IDC_LEAD_LOCATION: NORMAL
MDC_IDC_LEAD_LOCATION_DETAIL_1: NORMAL
MDC_IDC_LEAD_LOCATION_DETAIL_1: NORMAL
MDC_IDC_LEAD_MFG: NORMAL
MDC_IDC_LEAD_MFG: NORMAL
MDC_IDC_LEAD_MODEL: NORMAL
MDC_IDC_LEAD_MODEL: NORMAL
MDC_IDC_LEAD_POLARITY_TYPE: NORMAL
MDC_IDC_LEAD_POLARITY_TYPE: NORMAL
MDC_IDC_LEAD_SERIAL: NORMAL
MDC_IDC_LEAD_SERIAL: NORMAL
MDC_IDC_MSMT_BATTERY_DTM: NORMAL
MDC_IDC_MSMT_BATTERY_REMAINING_LONGEVITY: 52 MO
MDC_IDC_MSMT_BATTERY_RRT_TRIGGER: 2.73
MDC_IDC_MSMT_BATTERY_STATUS: NORMAL
MDC_IDC_MSMT_BATTERY_VOLTAGE: 2.96 V
MDC_IDC_MSMT_CAP_CHARGE_DTM: NORMAL
MDC_IDC_MSMT_CAP_CHARGE_ENERGY: 18 J
MDC_IDC_MSMT_CAP_CHARGE_TIME: 3.97
MDC_IDC_MSMT_CAP_CHARGE_TYPE: NORMAL
MDC_IDC_MSMT_LEADCHNL_RA_IMPEDANCE_VALUE: 342 OHM
MDC_IDC_MSMT_LEADCHNL_RA_PACING_THRESHOLD_AMPLITUDE: 0.5 V
MDC_IDC_MSMT_LEADCHNL_RA_PACING_THRESHOLD_PULSEWIDTH: 0.4 MS
MDC_IDC_MSMT_LEADCHNL_RA_SENSING_INTR_AMPL: 1 MV
MDC_IDC_MSMT_LEADCHNL_RV_IMPEDANCE_VALUE: 418 OHM
MDC_IDC_MSMT_LEADCHNL_RV_IMPEDANCE_VALUE: 513 OHM
MDC_IDC_MSMT_LEADCHNL_RV_PACING_THRESHOLD_AMPLITUDE: 3.25 V
MDC_IDC_MSMT_LEADCHNL_RV_PACING_THRESHOLD_PULSEWIDTH: 1 MS
MDC_IDC_MSMT_LEADCHNL_RV_SENSING_INTR_AMPL: 0.9 MV
MDC_IDC_PG_IMPLANT_DTM: NORMAL
MDC_IDC_PG_MFG: NORMAL
MDC_IDC_PG_MODEL: NORMAL
MDC_IDC_PG_SERIAL: NORMAL
MDC_IDC_PG_TYPE: NORMAL
MDC_IDC_SESS_CLINIC_NAME: NORMAL
MDC_IDC_SESS_DTM: NORMAL
MDC_IDC_SESS_TYPE: NORMAL
MDC_IDC_SET_BRADY_AT_MODE_SWITCH_RATE: 171 {BEATS}/MIN
MDC_IDC_SET_BRADY_HYSTRATE: NORMAL
MDC_IDC_SET_BRADY_LOWRATE: 50 {BEATS}/MIN
MDC_IDC_SET_BRADY_MAX_SENSOR_RATE: 115 {BEATS}/MIN
MDC_IDC_SET_BRADY_MAX_TRACKING_RATE: 130 {BEATS}/MIN
MDC_IDC_SET_BRADY_MODE: NORMAL
MDC_IDC_SET_BRADY_PAV_DELAY_LOW: 350 MS
MDC_IDC_SET_BRADY_SAV_DELAY_LOW: 350 MS
MDC_IDC_SET_LEADCHNL_RA_PACING_AMPLITUDE: 1.5 V
MDC_IDC_SET_LEADCHNL_RA_PACING_ANODE_ELECTRODE_1: NORMAL
MDC_IDC_SET_LEADCHNL_RA_PACING_ANODE_LOCATION_1: NORMAL
MDC_IDC_SET_LEADCHNL_RA_PACING_CAPTURE_MODE: NORMAL
MDC_IDC_SET_LEADCHNL_RA_PACING_CATHODE_ELECTRODE_1: NORMAL
MDC_IDC_SET_LEADCHNL_RA_PACING_CATHODE_LOCATION_1: NORMAL
MDC_IDC_SET_LEADCHNL_RA_PACING_POLARITY: NORMAL
MDC_IDC_SET_LEADCHNL_RA_PACING_PULSEWIDTH: 0.4 MS
MDC_IDC_SET_LEADCHNL_RA_SENSING_ANODE_ELECTRODE_1: NORMAL
MDC_IDC_SET_LEADCHNL_RA_SENSING_ANODE_LOCATION_1: NORMAL
MDC_IDC_SET_LEADCHNL_RA_SENSING_CATHODE_ELECTRODE_1: NORMAL
MDC_IDC_SET_LEADCHNL_RA_SENSING_CATHODE_LOCATION_1: NORMAL
MDC_IDC_SET_LEADCHNL_RA_SENSING_POLARITY: NORMAL
MDC_IDC_SET_LEADCHNL_RA_SENSING_SENSITIVITY: 0.15 MV
MDC_IDC_SET_LEADCHNL_RV_PACING_AMPLITUDE: 5 V
MDC_IDC_SET_LEADCHNL_RV_PACING_ANODE_ELECTRODE_1: NORMAL
MDC_IDC_SET_LEADCHNL_RV_PACING_ANODE_LOCATION_1: NORMAL
MDC_IDC_SET_LEADCHNL_RV_PACING_CAPTURE_MODE: NORMAL
MDC_IDC_SET_LEADCHNL_RV_PACING_CATHODE_ELECTRODE_1: NORMAL
MDC_IDC_SET_LEADCHNL_RV_PACING_CATHODE_LOCATION_1: NORMAL
MDC_IDC_SET_LEADCHNL_RV_PACING_POLARITY: NORMAL
MDC_IDC_SET_LEADCHNL_RV_PACING_PULSEWIDTH: 1 MS
MDC_IDC_SET_LEADCHNL_RV_SENSING_ANODE_ELECTRODE_1: NORMAL
MDC_IDC_SET_LEADCHNL_RV_SENSING_ANODE_LOCATION_1: NORMAL
MDC_IDC_SET_LEADCHNL_RV_SENSING_CATHODE_ELECTRODE_1: NORMAL
MDC_IDC_SET_LEADCHNL_RV_SENSING_CATHODE_LOCATION_1: NORMAL
MDC_IDC_SET_LEADCHNL_RV_SENSING_POLARITY: NORMAL
MDC_IDC_SET_LEADCHNL_RV_SENSING_SENSITIVITY: 0.3 MV
MDC_IDC_SET_ZONE_DETECTION_BEATS_DENOMINATOR: 40 {BEATS}
MDC_IDC_SET_ZONE_DETECTION_BEATS_NUMERATOR: 30 {BEATS}
MDC_IDC_SET_ZONE_DETECTION_INTERVAL: 280 MS
MDC_IDC_SET_ZONE_DETECTION_INTERVAL: 320 MS
MDC_IDC_SET_ZONE_DETECTION_INTERVAL: 350 MS
MDC_IDC_SET_ZONE_DETECTION_INTERVAL: 350 MS
MDC_IDC_SET_ZONE_DETECTION_INTERVAL: 400 MS
MDC_IDC_SET_ZONE_TYPE: NORMAL
MDC_IDC_STAT_AT_BURDEN_PERCENT: 4.9 %
MDC_IDC_STAT_AT_DTM_END: NORMAL
MDC_IDC_STAT_AT_DTM_START: NORMAL
MDC_IDC_STAT_BRADY_AP_VP_PERCENT: 0.02 %
MDC_IDC_STAT_BRADY_AP_VS_PERCENT: 0.02 %
MDC_IDC_STAT_BRADY_AS_VP_PERCENT: 0.06 %
MDC_IDC_STAT_BRADY_AS_VS_PERCENT: 99.9 %
MDC_IDC_STAT_BRADY_DTM_END: NORMAL
MDC_IDC_STAT_BRADY_DTM_START: NORMAL
MDC_IDC_STAT_BRADY_RA_PERCENT_PACED: 0.03 %
MDC_IDC_STAT_BRADY_RV_PERCENT_PACED: 0.08 %
MDC_IDC_STAT_EPISODE_RECENT_COUNT: 0
MDC_IDC_STAT_EPISODE_RECENT_COUNT: 1
MDC_IDC_STAT_EPISODE_RECENT_COUNT_DTM_END: NORMAL
MDC_IDC_STAT_EPISODE_RECENT_COUNT_DTM_START: NORMAL
MDC_IDC_STAT_EPISODE_TOTAL_COUNT: 0
MDC_IDC_STAT_EPISODE_TOTAL_COUNT: 1
MDC_IDC_STAT_EPISODE_TOTAL_COUNT: 1
MDC_IDC_STAT_EPISODE_TOTAL_COUNT: 34
MDC_IDC_STAT_EPISODE_TOTAL_COUNT: 9
MDC_IDC_STAT_EPISODE_TOTAL_COUNT_DTM_END: NORMAL
MDC_IDC_STAT_EPISODE_TOTAL_COUNT_DTM_START: NORMAL
MDC_IDC_STAT_EPISODE_TYPE: NORMAL
MDC_IDC_STAT_TACHYTHERAPY_ATP_DELIVERED_RECENT: 0
MDC_IDC_STAT_TACHYTHERAPY_ATP_DELIVERED_TOTAL: 0
MDC_IDC_STAT_TACHYTHERAPY_RECENT_DTM_END: NORMAL
MDC_IDC_STAT_TACHYTHERAPY_RECENT_DTM_START: NORMAL
MDC_IDC_STAT_TACHYTHERAPY_SHOCKS_ABORTED_RECENT: 0
MDC_IDC_STAT_TACHYTHERAPY_SHOCKS_ABORTED_TOTAL: 0
MDC_IDC_STAT_TACHYTHERAPY_SHOCKS_DELIVERED_RECENT: 0
MDC_IDC_STAT_TACHYTHERAPY_SHOCKS_DELIVERED_TOTAL: 1
MDC_IDC_STAT_TACHYTHERAPY_TOTAL_DTM_END: NORMAL
MDC_IDC_STAT_TACHYTHERAPY_TOTAL_DTM_START: NORMAL
PHOSPHATE SERPL-MCNC: 3.9 MG/DL (ref 2.5–4.5)
PLATELET # BLD AUTO: 366 10E3/UL (ref 150–450)
POTASSIUM SERPL-SCNC: 4.3 MMOL/L (ref 3.4–5.3)
PROT SERPL-MCNC: 6.6 G/DL (ref 6.4–8.3)
RBC # BLD AUTO: 2.9 10E6/UL (ref 4.4–5.9)
SARS-COV-2 RNA RESP QL NAA+PROBE: NEGATIVE
SODIUM SERPL-SCNC: 135 MMOL/L (ref 136–145)
UFH PPP CHRO-ACNC: 0.57 IU/ML
UFH PPP CHRO-ACNC: 0.64 IU/ML
WBC # BLD AUTO: 11.3 10E3/UL (ref 4–11)

## 2022-07-29 PROCEDURE — 87040 BLOOD CULTURE FOR BACTERIA: CPT | Performed by: SURGERY

## 2022-07-29 PROCEDURE — 250N000013 HC RX MED GY IP 250 OP 250 PS 637: Performed by: STUDENT IN AN ORGANIZED HEALTH CARE EDUCATION/TRAINING PROGRAM

## 2022-07-29 PROCEDURE — 250N000011 HC RX IP 250 OP 636: Performed by: STUDENT IN AN ORGANIZED HEALTH CARE EDUCATION/TRAINING PROGRAM

## 2022-07-29 PROCEDURE — 85300 ANTITHROMBIN III ACTIVITY: CPT | Performed by: SURGERY

## 2022-07-29 PROCEDURE — 83605 ASSAY OF LACTIC ACID: CPT | Performed by: SURGERY

## 2022-07-29 PROCEDURE — 85520 HEPARIN ASSAY: CPT | Performed by: THORACIC SURGERY (CARDIOTHORACIC VASCULAR SURGERY)

## 2022-07-29 PROCEDURE — 82330 ASSAY OF CALCIUM: CPT | Performed by: SURGERY

## 2022-07-29 PROCEDURE — 36592 COLLECT BLOOD FROM PICC: CPT | Performed by: INTERNAL MEDICINE

## 2022-07-29 PROCEDURE — 85014 HEMATOCRIT: CPT | Performed by: SURGERY

## 2022-07-29 PROCEDURE — 84100 ASSAY OF PHOSPHORUS: CPT | Performed by: SURGERY

## 2022-07-29 PROCEDURE — 120N000003 HC R&B IMCU UMMC

## 2022-07-29 PROCEDURE — 80053 COMPREHEN METABOLIC PANEL: CPT | Performed by: SURGERY

## 2022-07-29 PROCEDURE — 85520 HEPARIN ASSAY: CPT | Performed by: INTERNAL MEDICINE

## 2022-07-29 PROCEDURE — 250N000013 HC RX MED GY IP 250 OP 250 PS 637: Performed by: INTERNAL MEDICINE

## 2022-07-29 PROCEDURE — 92526 ORAL FUNCTION THERAPY: CPT | Mod: GN

## 2022-07-29 PROCEDURE — 250N000013 HC RX MED GY IP 250 OP 250 PS 637: Performed by: SURGERY

## 2022-07-29 PROCEDURE — 97530 THERAPEUTIC ACTIVITIES: CPT | Mod: GP | Performed by: PHYSICAL THERAPIST

## 2022-07-29 PROCEDURE — 250N000011 HC RX IP 250 OP 636: Performed by: SURGERY

## 2022-07-29 PROCEDURE — 97535 SELF CARE MNGMENT TRAINING: CPT | Mod: GO

## 2022-07-29 PROCEDURE — 250N000013 HC RX MED GY IP 250 OP 250 PS 637: Performed by: THORACIC SURGERY (CARDIOTHORACIC VASCULAR SURGERY)

## 2022-07-29 PROCEDURE — 85379 FIBRIN DEGRADATION QUANT: CPT | Performed by: SURGERY

## 2022-07-29 PROCEDURE — 36592 COLLECT BLOOD FROM PICC: CPT | Performed by: SURGERY

## 2022-07-29 PROCEDURE — 83735 ASSAY OF MAGNESIUM: CPT | Performed by: SURGERY

## 2022-07-29 PROCEDURE — U0003 INFECTIOUS AGENT DETECTION BY NUCLEIC ACID (DNA OR RNA); SEVERE ACUTE RESPIRATORY SYNDROME CORONAVIRUS 2 (SARS-COV-2) (CORONAVIRUS DISEASE [COVID-19]), AMPLIFIED PROBE TECHNIQUE, MAKING USE OF HIGH THROUGHPUT TECHNOLOGIES AS DESCRIBED BY CMS-2020-01-R: HCPCS | Performed by: STUDENT IN AN ORGANIZED HEALTH CARE EDUCATION/TRAINING PROGRAM

## 2022-07-29 PROCEDURE — 85384 FIBRINOGEN ACTIVITY: CPT | Performed by: SURGERY

## 2022-07-29 PROCEDURE — 250N000013 HC RX MED GY IP 250 OP 250 PS 637: Performed by: ANESTHESIOLOGY

## 2022-07-29 PROCEDURE — 99233 SBSQ HOSP IP/OBS HIGH 50: CPT | Mod: GC | Performed by: INTERNAL MEDICINE

## 2022-07-29 PROCEDURE — 85610 PROTHROMBIN TIME: CPT | Performed by: SURGERY

## 2022-07-29 RX ORDER — WARFARIN SODIUM 10 MG/1
10 TABLET ORAL
Status: COMPLETED | OUTPATIENT
Start: 2022-07-29 | End: 2022-07-29

## 2022-07-29 RX ADMIN — ATORVASTATIN CALCIUM 40 MG: 40 TABLET, FILM COATED ORAL at 20:08

## 2022-07-29 RX ADMIN — TRAZODONE HYDROCHLORIDE 50 MG: 50 TABLET ORAL at 21:50

## 2022-07-29 RX ADMIN — HYDROXYZINE HYDROCHLORIDE 50 MG: 25 TABLET ORAL at 06:11

## 2022-07-29 RX ADMIN — OLANZAPINE 5 MG: 5 TABLET, FILM COATED ORAL at 20:08

## 2022-07-29 RX ADMIN — HYDROXYZINE HYDROCHLORIDE 50 MG: 25 TABLET ORAL at 00:11

## 2022-07-29 RX ADMIN — OXYCODONE HYDROCHLORIDE 5 MG: 5 TABLET ORAL at 06:12

## 2022-07-29 RX ADMIN — Medication 6.25 MG: at 21:49

## 2022-07-29 RX ADMIN — MULTIVITAMIN 15 ML: LIQUID ORAL at 08:05

## 2022-07-29 RX ADMIN — Medication 1 PACKET: at 15:38

## 2022-07-29 RX ADMIN — Medication 1 PACKET: at 08:06

## 2022-07-29 RX ADMIN — Medication 40 MG: at 08:05

## 2022-07-29 RX ADMIN — OXYCODONE HYDROCHLORIDE 5 MG: 5 TABLET ORAL at 20:07

## 2022-07-29 RX ADMIN — OXYCODONE HYDROCHLORIDE 5 MG: 5 TABLET ORAL at 12:08

## 2022-07-29 RX ADMIN — Medication 6.25 MG: at 15:37

## 2022-07-29 RX ADMIN — FLUTICASONE PROPIONATE AND SALMETEROL XINAFOATE 2 PUFF: 115; 21 AEROSOL, METERED RESPIRATORY (INHALATION) at 08:13

## 2022-07-29 RX ADMIN — OXYCODONE HYDROCHLORIDE 5 MG: 5 TABLET ORAL at 00:11

## 2022-07-29 RX ADMIN — OLANZAPINE 5 MG: 5 TABLET, FILM COATED ORAL at 08:05

## 2022-07-29 RX ADMIN — Medication 6.25 MG: at 08:05

## 2022-07-29 RX ADMIN — OXYCODONE HYDROCHLORIDE 5 MG: 5 TABLET ORAL at 03:59

## 2022-07-29 RX ADMIN — HEPARIN SODIUM AND DEXTROSE 1750 UNITS/HR: 10000; 5 INJECTION INTRAVENOUS at 16:48

## 2022-07-29 RX ADMIN — HYDRALAZINE HYDROCHLORIDE 10 MG: 20 INJECTION INTRAMUSCULAR; INTRAVENOUS at 04:11

## 2022-07-29 RX ADMIN — Medication 1 PACKET: at 20:21

## 2022-07-29 RX ADMIN — BUMETANIDE 3 MG: 0.25 INJECTION, SOLUTION INTRAMUSCULAR; INTRAVENOUS at 08:05

## 2022-07-29 RX ADMIN — DULOXETINE 30 MG: 30 CAPSULE, DELAYED RELEASE ORAL at 08:05

## 2022-07-29 RX ADMIN — BUMETANIDE 3 MG: 0.25 INJECTION, SOLUTION INTRAMUSCULAR; INTRAVENOUS at 16:14

## 2022-07-29 RX ADMIN — ACETAMINOPHEN 975 MG: 325 TABLET, FILM COATED ORAL at 15:37

## 2022-07-29 RX ADMIN — ASPIRIN 81 MG CHEWABLE TABLET 81 MG: 81 TABLET CHEWABLE at 08:05

## 2022-07-29 RX ADMIN — HEPARIN SODIUM AND DEXTROSE 1750 UNITS/HR: 10000; 5 INJECTION INTRAVENOUS at 01:59

## 2022-07-29 RX ADMIN — DIGOXIN 125 MCG: 125 TABLET ORAL at 08:05

## 2022-07-29 RX ADMIN — HYDROXYZINE HYDROCHLORIDE 50 MG: 25 TABLET ORAL at 10:43

## 2022-07-29 RX ADMIN — HYDROMORPHONE HYDROCHLORIDE 0.4 MG: 0.2 INJECTION, SOLUTION INTRAMUSCULAR; INTRAVENOUS; SUBCUTANEOUS at 04:11

## 2022-07-29 RX ADMIN — HYDROXYCHLOROQUINE SULFATE 200 MG: 200 TABLET, FILM COATED ORAL at 20:21

## 2022-07-29 RX ADMIN — FLUTICASONE PROPIONATE AND SALMETEROL XINAFOATE 2 PUFF: 115; 21 AEROSOL, METERED RESPIRATORY (INHALATION) at 22:07

## 2022-07-29 RX ADMIN — WARFARIN SODIUM 10 MG: 10 TABLET ORAL at 18:10

## 2022-07-29 RX ADMIN — INSULIN ASPART 1 UNITS: 100 INJECTION, SOLUTION INTRAVENOUS; SUBCUTANEOUS at 08:10

## 2022-07-29 RX ADMIN — Medication 1 PACKET: at 20:13

## 2022-07-29 RX ADMIN — HYDROXYCHLOROQUINE SULFATE 200 MG: 200 TABLET, FILM COATED ORAL at 08:05

## 2022-07-29 RX ADMIN — HYDROMORPHONE HYDROCHLORIDE 0.4 MG: 0.2 INJECTION, SOLUTION INTRAMUSCULAR; INTRAVENOUS; SUBCUTANEOUS at 01:56

## 2022-07-29 RX ADMIN — Medication 10 MG: at 21:50

## 2022-07-29 RX ADMIN — ACETAMINOPHEN 975 MG: 325 TABLET, FILM COATED ORAL at 08:04

## 2022-07-29 ASSESSMENT — ACTIVITIES OF DAILY LIVING (ADL)
ADLS_ACUITY_SCORE: 33
ADLS_ACUITY_SCORE: 33
ADLS_ACUITY_SCORE: 40
ADLS_ACUITY_SCORE: 40
ADLS_ACUITY_SCORE: 33
ADLS_ACUITY_SCORE: 40
ADLS_ACUITY_SCORE: 33
ADLS_ACUITY_SCORE: 40
ADLS_ACUITY_SCORE: 33

## 2022-07-29 NOTE — PROGRESS NOTES
LVAD Social Work Services Progress Note      Date of Initial Social Work Evaluation: 5/18/2022 with admission assessment completed on 6/20/2022  Collaborated with: Dandy    Data: Dandy is recovering status-post VAD and has been able to move out of ICU to . Still has a feeding tube and connected to some drips, but seems oriented for the most part. Was able to recognize writer. Reports son has been here everyday and usually comes late in the afternoon. Son is in an [place] apartment. Dtr will most likely come in once education starts. Teaching should begin sometime next week. Dandy may be ready for discharge to rehab next week.  Intervention: Met with Dandy in order to inquire into any questions/concerns. Talked about discharge planning process and that he would most likely need to go to  TCU/ARU when medically stable. Assessed coping.  Assessment: Dandy was friendly and talkative. Recognized writer. Not appropriate for VAD teaching yet. Potential discharge to Rehab next week when medically stable  Education provided by SW: Discharge planning process and ongoing SW availability  Plan:    Discharge Plans in Progress: [facility] TCU/ARU    Barriers to d/c plan: Medical condition    Follow up Plan: SW will continue to follow for ongoing psychosocial support post-VAD and assistance with discharge planning.

## 2022-07-29 NOTE — PROGRESS NOTES
"D: Stopped by to see patient. Pt is laying in bed. He is alert to self, place, situation, year, disoriented to date. Pt acknowledges that he doesn't know anything about \"this thing\" and needs education.   I: Discussed POC and provided support and listened to patient and caregiver's thoughts and concerns. VAD Coordinator has been communicating with pt's daughter and son for planning for education. This will take place when pt is alert and oriented and has the stamina to participate.   P: Continue to follow patient and address any questions or concerns patient and or caregiver may have.    "

## 2022-07-29 NOTE — PLAN OF CARE
Major Shift Events:  did not sleep during the night . Anxious , restless . Hyperventilating , impulsive , difficult to reorient and re-direct . On  1:1 supervision for safety . Prn atarax given with no effect . Hemodynamically stable , one time hydralazine given for MAP> 90. Tele 's . On 2-3 lpm NC . LVAD HM3 functioning with no alarms . PI 3-5 . Power and flow 3-4 . Tolerated TF via NJ at goal , had two loose BMs . Fitzgerald cath in place with adequate UOP .  Hep gtt at 1750 units /hr , next recheck Hep10a with be at noon . Stable with sever anxiety .     Plan: calorie count starts today . Continue to monitor and report any concerns.   For vital signs and complete assessments, please see documentation flowsheets.      Problem: Risk for Delirium  Goal: Optimal Coping  Outcome: Unable to Meet, Plan Revised     Problem: Cardiac Output Decreased  Goal: Effective Cardiac Output  Outcome: Ongoing, Progressing  Intervention: Optimize Cardiac Output  Recent Flowsheet Documentation  Taken 7/29/2022 0400 by Ricky Davenport RN  VTE Prevention/Management: patient refused intervention  Head of Bed (HOB) Positioning: HOB at 30-45 degrees  T  Problem: Bleeding (Ventricular Assist Device)  Goal: Absence of Bleeding  Outcome: Ongoing, Progressing     Problem: Respiratory Compromise COPD (Chronic Obstructive Pulmonary Disease)  Goal: Effective Oxygenation and Ventilation  Outcome: Ongoing, Progressing  Intervention: Promote Airway Secretion Clearance  Recent Flowsheet Documentation  Taken 7/29/2022 0400 by Ricky Davenport RN  Cough And Deep Breathing: done independently per patient  Activity Management: activity adjusted per tolerance  Taken 7/29/2022 0000 by Ricky Davenport RN  Cough And Deep Breathing: done independently per patient  Activity Management: activity adjusted per tolerance  Intervention: Optimize Oxygenation and Ventilation  Recent Flowsheet Documentation  Taken 7/29/2022 0400 by Ricky Davenport RN  Head of Bed (HOB)  Positioning: HOB at 30-45 degrees  T  Problem: Fluid Imbalance (Heart Failure)  Goal: Fluid Balance  Outcome: Ongoing, Progressing     Problem: Sleep Disordered Breathing (Heart Failure)  Goal: Effective Breathing Pattern During Sleep  Outcome: Ongoing, Not Progressing

## 2022-07-29 NOTE — PLAN OF CARE
Major Shift Events:  Patient Alert oriented, Transferred to 6B. See Below. LVAD Dressing changed, LVAD site swabbed for cultures due to Serous Drainage.    Plan: Transfer care to 6B.    For vital signs and complete assessments, please see documentation flowsheets.     Transferred to: Unit 6B at [id number]  Belongings:   Fitzgerald removed? No: Strict IO  Central line removed? No, Needed for Access  Chart and medications sent with patient Yes  Family notified: Yes, Amos Notified @ [id number]

## 2022-07-29 NOTE — PROGRESS NOTES
CV ICU PROGRESS NOTE  07/29/2022        CO-MORBIDITIES  1. Cardiogenic shock (H)  (primary encounter diagnosis)  2. Ischemic cardiomyopathy  3. Heart failure with reduced ejection fraction, NYHA class III (H)  4. Coronary artery disease involving native coronary artery of native heart without angina pectoris      ASSESSMENT Dandy Sands is a 61 year old male with a PMH of CAD s/p PCI to LAD, MI, ICM, acute on chronic heart failure, s/p CRT-D, severe MR, antiphospholipid antibody syndrome on chronic warfarin, SLE, HTN, HLD, recent hospitalization 5/16-5/26 s/p RCA, LAD stenting who underwent RVAD (29F Protek duo RIJ) LVAD placement (HeartMate 3) on 7/8/22 by Dr. Zamora. Intraoperative course complicated by IABP dysfunction and RV failure, requiring crash onto bypass / vasopressor support, NO, and protek placement. Now s/p chest closure and NO wean 7/11. Return to OR for hemopericardium (7/12) and chest re-closure 7/13. Protek RVAD removed 7/21.      PLAN:   Neuro/ pain/ sedation:  #Acute Postoperative pain  #Depression   #Anxiety due to a medical condition  #Insomnia  #Delerium   Delirious intermittently.   - Monitor neurological status. Notify the MD for any acute changes in exam.  - Pain:    -Scheduled tylenol, uriel oxy 5mg q8h, wean off scheduled Oxy 7/29   -PRN oxycodone 5mg q6h  - Depression: Duloxetine 30mg  - Sleep: Melatonin 10 mg HS   - discontinue Seroquel 100 mg HS   - start trazodone    - Zyprexa 5mg BID  - Anxiety: Valproate po 250 mg BID and 500mg Bedtime   - Hydroxyzine PRN  - PTA meds: hydroxyzine 25mg PRN, zolpidem 5mg, melatonin 3mg, lorazepam 0.5mg  - QTc(7/18)- 683 msec, no haldol     Pulmonary:   #Acute hypoxic respiratory failure on iMV  #Mild-moderate emphysema  #History of COVID-19  Nasal cannula 4L   - Maintain SpO2> 92%  - Incentive spirometry     Cardiovascular:    #S/p LVAD placement (HeartMate 3) on 7/8/22   #S/p RVAD (29F Protek duo RIJ) on 7/8/22  #S/p chest closure  7/11  #Cardiogenic shock  #Advanced ischemic cardiomyopathy, class IV, stage D  #CAD s/p PCI to LAD (2005)  #S/p CRT-D (2006)  #History of MI (2007)  #Severe MR  #Antiphospholipid antibody syndrome on chronic warfarin  #HTN, HLD  #Recent hospitalization 5/16-5/26 s/p RCA, LAD stenting  #Atrial fibrillation   # RV mobile clot   - Monitor hemodynamic status. Chest closure and oxygenator splicing 7/11. Reopened 7/12 for I&D and left open, closed 7/13. RVAD removed 7/21.  - Goal MAP > 65   - PTA meds(held): clopidogrel 75mg, lasix 40mg, warfarin 5mg  - LVAD management per Advanced HF service  - AC Plan: High intensity Heparin for RV mobile thrombus  - Aspirin 81 mg  - Atorvastatin 40mg   - Hydralazine uriel and prn MAP >85 discontinued  - Captopril 6.25mg TID dose titration per cardiology   - Warfarin 7/24 per pharmacy    GI / Nutrition:   #GERD  #Congestive hepatopathy in the setting of cardiac insuffiencey -resolved  #Hematemesis / oral mucosal bleeding -resolved    - Full liquid diet, SLP following  - Continue NJT at goal   - Nutrition following  - Bowel regimen prn (miralax / senna)  - Trend LFT's every other day    Renal/ Fluid Balance:    - Strict I/O, daily weights   - Avoid/limit nephrotoxins as able  - Replete lytes PRN per protocol  - PTA meds: tamsulosin 0.4mg (held)  - Goal net negative ~ 1L   Bumex gtt for part of 7/24: total UOP 8L, gtt stopped   Bumex 4mg x2 7/25 with over 6L UOP  - Bumex 3mg x3 7/27 and 7/28 with good diuresis, continue bumex 3 mg IV BID today per cards, already net negative 1L today    Endocrine:    #Stress induced hyperglycemia  Preop A1c 5.5%  - Insulin glargine 20U and high sliding scale  - Goal BG <180 for optimal healing   - Holding glargine 7/29 in anticipation of possibly cycling tube feeding, has had minimal insulin requirements 7/29 with TF running. Will continue to hold and plan to start cycle feeds 7/30, may need NPH if BG starts to elevate    ID/ Antibiotics:  #Periopearive  antibiosis (s/p course of Cefepime, vancomycin, rifampin, fluconazole)  #HAP - Enterococcus faecalis PNA, s/p treatment   - 7/12 Pan culture: Bcx x2, UA/UC -> negative  - 7/14 Endotracheal sputum culture (4+ candida albicans, 1+ E.Coli, 3+ enterococcus faecalis)   - Zosyn for HAP(7/15-7/22)  - 7/18 C.diff- negative  - 7/25 Blood cultures drawn for unexplained hypotension   - NGTD    Heme:     #Acute blood loss anemia  #Acute blood loss thrombocytopenia  #History of DVT and PE   #Antiphospholipid antibody syndrome  #History of iron deficiency anemia  - Monitor for s/sx of bleeding  - Trend CBC and coags  - Hgb goal >8   Hgb stable 8s, no signs or symptoms of bleeding  - Plt goal > 20    Rheumatology:  #SLE  - Chronic: symptoms include arthritis, photosensitivity, fatigue, and skin manifestations  - PTA meds: hydroxychloroquine 200mg, resumed 7/16  - Rheumatology consulted and following. Follow dsDNA and complement levels(7/18)    -Will need cellcept restarted when appropriate.    Prophylaxis:    - DVT: SCD + high intensity heparin + warfarin   - PPI    Disposition:  - Transfer to  on 7/28     Discussed with Dr. Noah Gomez PATayeC  Cardiothoracic Surgery  p: [phone]  ----------------------------------------------------------------    Interval Events:  No acute events. Doing well, better every day. Continues to have some SOB whenever up and moving, otherwise its okay. Appetite is improving. Pain controlled.    OBJECTIVE:   1. VITAL SIGNS:   Temp:  [97.3  F (36.3  C)-98.6  F (37  C)] 97.7  F (36.5  C)  Pulse:  [104-113] 110  Resp:  [18-30] 20  BP: ()/(67-97) 114/97  SpO2:  [93 %-100 %] 100 %  Resp: 20      2. INTAKE/ OUTPUT:   I/O last 3 completed shifts:  In: 3465 [P.O.:1360; I.V.:390; NG/GT:565]  Out: 4175 [Urine:4075; Stool:100]    3. PHYSICAL EXAMINATION:   General: sitting in chair, comfortable  Neuro: follow commands, intermittent delerium  Resp: on 4L NC  CV: tachycardic   Abdomen: Soft,  mildly distended, Non-tender  Incisions: c/d/i  Extremities: warm and well perfused  LVAD Flow 3.3-3.8, PI 4.1-6.1, Speed 5300, Power 3.7-3.9    4. INVESTIGATIONS:   Arterial Blood Gases   Recent Labs   Lab 07/25/22  2109 07/25/22  0914 07/23/22  1419 07/23/22  1224   PH 7.50* 7.50* 7.47* 7.40   PCO2 46* 46* 39 43   PO2 140* 91 178* 137*   HCO3 36* 35* 29* 27     Complete Blood Count   Recent Labs   Lab 07/29/22  0525 07/28/22  1431 07/28/22  0419 07/27/22  0338 07/26/22  0406   WBC 11.3*  --  9.9 9.1 15.9*   HGB 8.4* 8.5* 7.1* 8.2* 8.6*     --  348 311 372     Basic Metabolic Panel  Recent Labs   Lab 07/29/22  1233 07/29/22  0723 07/29/22  0525 07/29/22  0355 07/28/22  1547 07/28/22  1431 07/28/22  0800 07/28/22  0419 07/27/22  2304 07/27/22  2035 07/27/22  0806 07/27/22  0338 07/26/22  1558 07/26/22  1547   NA  --   --  135*  --   --   --   --  134*  --   --   --  132*  --  134*   POTASSIUM  --   --  4.3  --   --  4.1  --  4.7  --  3.7   < > 4.1  --  4.5   CHLORIDE  --   --  92*  --   --   --   --  94*  --   --   --  92*  --  95*   CO2  --   --  33*  --   --   --   --  33*  --   --   --  31*  --  33*   BUN  --   --  16.9  --   --   --   --  18.0  --   --   --  20.2  --  17.9   CR  --   --  0.58*  --   --   --   --  0.58*  --   --   --  0.70  --  0.73   * 150* 115* 123*   < >  --    < > 119*   < > 110*   < > 135*   < > 84    < > = values in this interval not displayed.     Liver Function Tests  Recent Labs   Lab 07/29/22  0525 07/28/22  0419 07/27/22  0338 07/26/22  0406   AST 34 37 32 33   ALT 13 13 13 13   ALKPHOS 230* 223* 225* 220*   BILITOTAL 0.5 0.5 0.6 0.6   ALBUMIN 2.9* 2.7* 2.6* 2.8*  2.8*   INR 1.37* 1.29* 1.39* 1.31*     Pancreatic Enzymes  No lab results found in last 7 days.  Coagulation Profile  Recent Labs   Lab 07/29/22  0525 07/28/22  0419 07/27/22  0338 07/26/22  0406 07/24/22  1639 07/24/22  0953 07/24/22  0405 07/23/22  2124 07/23/22  1617   INR 1.37* 1.29* 1.39* 1.31*   < > 1.16*  1.22* 1.23* 1.19*   PTT  --   --   --   --   --  100* >240* 105* 74*    < > = values in this interval not displayed.         5. RADIOLOGY:   Recent Results (from the past 24 hour(s))   Cardiac Device Check - Inpatient   Result Value    Date Time Interrogation Session [id number]    Implantable Pulse Generator  Medtronic    Implantable Pulse Generator Model [id number] Evera XT DR    Implantable Pulse Generator Serial Number [id number]    Type Interrogation Session In Clinic    Clinic Name [facility]    Implantable Pulse Generator Type Defibrillator    Implantable Pulse Generator Implant Date 20180412    Implantable Lead  Medtronic    Implantable Lead Model 5076 CapSureFix Novus    Implantable Lead Serial Number [id number]    Implantable Lead Implant Date 20060306    Implantable Lead Polarity Type Bipolar Lead    Implantable Lead Location Detail 1 UNKNOWN    Implantable Lead Location Right Atrium    Implantable Lead  Medtronic    Implantable Lead Model 6949 Sprint Doron    Implantable Lead Serial Number [id number]    Implantable Lead Implant Date 20060306    Implantable Lead Polarity Type Bipolar Lead    Implantable Lead Location Detail 1 UNKNOWN    Implantable Lead Location Right Ventricle    Marcos Setting Mode (NBG Code) MVP_AAI_DDD    Marcos Setting Lower Rate Limit 50    Marcos Setting Maximum Tracking Rate 130    Marcos Setting Maximum Sensor Rate 115    Marcos Setting Hysterisis Rate DISABLED    Marcos Setting TRISTEN Delay Low 350    Marcos Setting PAV Delay Low 350    Marcos Setting AT Mode Switch Rate 171    Lead Channel Setting Sensing Polarity Bipolar    Lead Channel Setting Sensing Anode Location Right Atrium    Lead Channel Setting Sensing Anode Terminal Ring    Lead Channel Setting Sensing Cathode Location Right Atrium    Lead Channel Setting Sensing Cathode Terminal Tip    Lead Channel Setting Sensing Sensitivity 0.15    Lead Channel Setting Sensing  Polarity Bipolar    Lead Channel Setting Sensing Anode Location Right Ventricle    Lead Channel Setting Sensing Anode Terminal Ring    Lead Channel Setting Sensing Cathode Location Right Ventricle    Lead Channel Setting Sensing Cathode Terminal Tip    Lead Channel Setting Sensing Sensitivity 0.3    Lead Channel Setting Pacing Polarity Bipolar    Lead Channel Setting Pacing Anode Location Right Atrium    Lead Channel Setting Pacing Anode Terminal Ring    Lead Channel Setting Sensing Cathode Location Right Atrium    Lead Channel Setting Sensing Cathode Terminal Tip    Lead Channel Setting Pacing Pulse Width 0.4    Lead Channel Setting Pacing Amplitude 1.5    Lead Channel Setting Pacing Capture Mode Adaptive    Lead Channel Setting Pacing Polarity Bipolar    Lead Channel Setting Pacing Anode Location Right Ventricle    Lead Channel Setting Pacing Anode Terminal Ring    Lead Channel Setting Sensing Cathode Location Right Ventricle    Lead Channel Setting Sensing Cathode Terminal Tip    Lead Channel Setting Pacing Pulse Width 1    Lead Channel Setting Pacing Amplitude 5    Lead Channel Setting Pacing Capture Mode Adaptive    Zone Setting Type Category VF    Zone Setting Detection Interval 320    Zone Setting Detection Beats Numerator 30    Zone Setting Detection Beats Denominator 40    Zone Setting Type Category VT    Zone Setting Detection Interval 280    Zone Setting Type Category VT    Zone Setting Detection Interval 350    Zone Setting Type Category VT    Zone Setting Detection Interval 400    Zone Setting Type Category ATRIAL_FIBRILLATION    Zone Setting Type Category AT/AF    Zone Setting Detection Interval 350    Lead Channel Impedance Value 342    Lead Channel Sensing Intrinsic Amplitude 1    Lead Channel Pacing Threshold Amplitude 0.5    Lead Channel Pacing Threshold Pulse Width 0.4    Lead Channel Impedance Value 513    Lead Channel Impedance Value 418    Lead Channel Sensing Intrinsic Amplitude 0.9    Lead  Channel Pacing Threshold Amplitude 3.25    Lead Channel Pacing Threshold Pulse Width 1    Battery Date Time of Measurements 53024818830111    Battery Status OK    Battery RRT Trigger 2.727    Battery Remaining Longevity 52    Battery Voltage 2.96    Capacitor Charge Type Reformation    Capacitor Last Charge Date Time 20220528111827    Capacitor Charge Time 3.973    Capacitor Charge Energy 18    Marcos Statistic Date Time Start 20220715101013    Marcos Statistic Date Time End 20220728143957    Marcos Statistic RA Percent Paced 0.03    Marcos Statistic RV Percent Paced 0.08    Marcos Statistic AP  Percent 0.02    Marcos Statistic AS  Percent 0.06    Marcos Statistic AP VS Percent 0.02    Marcos Statistic AS VS Percent 99.9    Atrial Tachy Statistic Date Time Start 20220715101013    Atrial Tachy Statistic Date Time End 20220728143957    Atrial Tachy Statistic AT/AF [place] Percent 4.9    Therapy Statistic Recent Shocks Delivered 0    Therapy Statistic Recent Shocks Aborted 0    Therapy Statistic Recent ATP Delivered 0    Therapy Statistic Recent Date Time Start 20220715101013    Therapy Statistic Recent Date Time End 55595323700477    Therapy Statistic Total Shocks Delivered 1    Therapy Statistic Total Shocks Aborted 0    Therapy Statistic Total ATP Delivered 0    Therapy Statistic Total  Date Time Start 54852633989315    Therapy Statistic Total  Date Time End 67449527925920    Episode Statistic Recent Count 1    Episode Statistic Type Category AT/AF    Episode Statistic Recent Count 0    Episode Statistic Type Category SVT    Episode Statistic Recent Count 0    Episode Statistic Type Category VT    Episode Statistic Recent Count 0    Episode Statistic Type Category VF    Episode Statistic Recent Count 0    Episode Statistic Type Category VT    Episode Statistic Recent Count 0    Episode Statistic Type Category VT    Episode Statistic Recent Count 0    Episode Statistic Type Category VT    Episode Statistic Recent Date  Time Start 41887530011171    Episode Statistic Recent Date Time End 67766918587957    Episode Statistic Recent Date Time Start 04829204033510    Episode Statistic Recent Date Time End 23711906020658    Episode Statistic Recent Date Time Start 83592858965933    Episode Statistic Recent Date Time End 15089859588972    Episode Statistic Recent Date Time Start 14646105971449    Episode Statistic Recent Date Time End 71899093826957    Episode Statistic Recent Date Time Start 75349443044849    Episode Statistic Recent Date Time End 96939796492812    Episode Statistic Recent Date Time Start 11383214745868    Episode Statistic Recent Date Time End 68458489700470    Episode Statistic Recent Date Time Start 41768836121849    Episode Statistic Recent Date Time End 17982752828839    Episode Statistic Total Count 34    Episode Statistic Type Category AT/AF    Episode Statistic Total Count 0    Episode Statistic Type Category SVT    Episode Statistic Total Count 9    Episode Statistic Type Category VT    Episode Statistic Total Count 1    Episode Statistic Type Category VF    Episode Statistic Total Count 0    Episode Statistic Type Category VT    Episode Statistic Total Count 0    Episode Statistic Type Category VT    Episode Statistic Total Count 1    Episode Statistic Type Category VT    Episode Statistic Total Date Time Start 22911319753876    Episode Statistic Total Date Time End 81665254211468    Episode Statistic Total Date Time Start 23002852679180    Episode Statistic Total Date Time End 35479825832591    Episode Statistic Total Date Time Start 59583400632098    Episode Statistic Total Date Time End 81161665434701    Episode Statistic Total Date Time Start 95009447789427    Episode Statistic Total Date Time End 02999062336559    Episode Statistic Total Date Time Start 25726607939759    Episode Statistic Total Date Time End 56255736056032    Episode Statistic Total Date Time Start 88083477481224    Episode Statistic Total  Date Time End 20220728143957    Episode Statistic Total Date Time Start 43961539718448    Episode Statistic Total Date Time End 30005035759033    Episode Identifier 57    Episode Type Category Monitor    Episode Date Time [id number]    Episode Duration 55,297    Narrative    Patient seen in [address] [place] [address] for evaluation and iterative programming of their   ICD per MD orders.    Device: Medtronic [id number] Evera XT DR  Normal Device Function.   Mode: AAI<=>DDD  bpm  AP: <0.1%  : <0.1%  Intrinsic rhythm: ST @ 115 bpm  Short V-V intervals: 4  Thoracic Impedance: Below reference line, suggesting possible fluid   accumulation.  Lead Trends Appear Stable: Yes. RV impedance measures 513 ohms, R-waves   measure small at 0.9 mV, and RV capture threshold measures high at 3.25 V   @ 1.00 ms. This has remained consistent over the last several weeks.  Estimated battery longevity to RRT = 4.3 years. Battery voltage = 2.96 V.  Atrial arrhythmia: 1 AT/AF episode recorded, lasting 15 hours 22 minutes   with ventricular rate of 107 bpm. This episode was recorded on 7/19/22.  AF burden: 4.9%  Anticoagulant: Warfarin  Ventricular Arrhythmia: None  Setting changes: Atrial sensitivity changed from 0.3 mV to 0.15 mV.   Patient's device has been alarming in the morning due to unsuccessful   [facility] transmission. Patient is due for a remote transmission with his   outpatient device clinic, but has not been home / near his bedside   monitor. This is normal function.     Plan: Continue inpatient evaluation and treatment.  RICKIE Mcpherson RN    Dual lead ICD    I have reviewed and interpreted the device interrogation, settings,   programming and nurse's summary. The device is functioning within normal   device parameters. I agree with the current findings, assessment and plan.       =========================================

## 2022-07-29 NOTE — PROGRESS NOTES
[facility]    Cardiology Progress Note- Cards 2        Date of Admission:  6/17/2022     Assessment & Plan: HVSL   Dandy EDUARD Sands is a 61 year old male with PMH of lupus, antiphospholipid syndrome, HTN, HLD, HFrEF 2/2 ICM who presented with cardiogenic shock c/b multiorgan failure (JOSE, shock liver) requiring mechanical support with IABP, now s/p HM3 LVAD 7/8/22 by Dr. Zamora with intraoperative course c/b RV failure s/p 29F Protek duo via RIJ. Post op course c/b hemopericardium s/p repeat washout with chest left open. Chest closed 7/13/22 and decannulated from RVAD 7/21    #HFrEF 2/2 ICM s/p HM3 LVAD in setting of cardiogenic shock (SCAI D)  #RV failure s/p Protek duo temporary RVAD s/p decannulation 7/21  #RV thrombus  #Post chest closure hemopericardium s/p repeat washout (7/12)  Patient with ICM s/p HM3 LVAD 7/8/22 by Dr. Zamora in setting of presentation for cardiogenic shock requiring MCS with IABP as prior to HM (initially evaluated for heart transplant, however noted to have substantially elevated DSAs during course of care, so decision made to proceed with LVAD given patient already on temporary MCS). Intraoperative course c/b RV failure resulting in cardiogenic shock requiring high dose pressors necessitating RVAD support. Initial post-op course c/b hemopericardium requiring repeat washout with chest left open, however has since recovered well with decannulation on 7/21 with extubation day prior. Has continued to tolerate well from hemodynamic and end organ standpoint. Continuing to work on pulmonary toilet and diuresis to improve his oxygenation and total body fluid status.   -LVAD: continues to have good flows, no alarms and stable end organ perfusion; continue speed at 5300  -continue digoxin for RV support  -AC, antiplatelets: continue ASA; heparin high intensity for RV thrombus; warfarin started and awaiting therapeutic level  -Volume status:  appears slightly hypervolemic; bumex 3mg IV BID with goal negative 500cc to 1L  -rhythm: sinus   -of note, patient with climbing capture threshold of RV lead; will need to be evaluated in future by EP team   -plan for repeat device interrogation 7/28 overall stable  -blood pressure: stable; continue captopril 6.25mg TID  -encourage ICS, pulmonary toilet  -delirium precautions    The patient's care was discussed with the Attending Physician, Dr. Faustin, Bedside Nurse and Primary team.     Emelyn Meneses MD  [facility]    ______________________________________________________________________    Interval History   Nursing notes reviewed. Some delirium ongoing overnight now with sitter in place. Otherwise BRAXTON. This AM, resting in bed in no distress. A little sleepy    Data reviewed today: I reviewed all medications, new labs and imaging results over the last 24 hours    Physical Exam   Vital Signs: Temp: 97.6  F (36.4  C) Temp src: Axillary BP: 109/67 Pulse: 108   Resp: 22 SpO2: 94 % O2 Device: Nasal cannula Oxygen Delivery: 2 LPM  Weight: 153 lbs 12.8 oz  General Appearance: Older male in NAD breathing on NC  Respiratory: coarse lung sounds, nonlabored  Cardiovascular: vad hum, driveline site c/d/i, chest incisions c/d/i, JVD ~10 cm   GI: soft, nt, bs active  Skin: warm, well perfused, trace diffuse edema  Neuro: awake, alert, interactive, speech fluent, moving all 4    Data   Recent Labs   Lab 07/29/22  0525 07/29/22  0355 07/28/22  2356 07/28/22  1547 07/28/22  1431 07/28/22  0800 07/28/22  0419 07/27/22  0806 07/27/22  0338   WBC 11.3*  --   --   --   --   --  9.9  --  9.1   HGB 8.4*  --   --   --  8.5*  --  7.1*  --  8.2*   MCV 95  --   --   --   --   --  95  --  92     --   --   --   --   --  348  --  311   INR 1.37*  --   --   --   --   --  1.29*  --  1.39*   *  --   --   --   --   --  134*  --  132*   POTASSIUM 4.3  --   --   --  4.1  --  4.7   <  > 4.1   CHLORIDE 92*  --   --   --   --   --  94*  --  92*   CO2 33*  --   --   --   --   --  33*  --  31*   BUN 16.9  --   --   --   --   --  18.0  --  20.2   CR 0.58*  --   --   --   --   --  0.58*  --  0.70   ANIONGAP 10  --   --   --   --   --  7  --  9   ELDA 8.8  --   --   --   --   --  8.4*  --  8.6*   * 123* 134*   < >  --    < > 119*   < > 135*   ALBUMIN 2.9*  --   --   --   --   --  2.7*  --  2.6*   PROTTOTAL 6.6  --   --   --   --   --  6.2*  --  5.9*   BILITOTAL 0.5  --   --   --   --   --  0.5  --  0.6   ALKPHOS 230*  --   --   --   --   --  223*  --  225*   ALT 13  --   --   --   --   --  13  --  13   AST 34  --   --   --   --   --  37  --  32    < > = values in this interval not displayed.       Medications     dextrose       heparin 1,750 Units/hr (07/29/22 0400)       acetaminophen  975 mg Oral or Feeding Tube Q8H CAMMY     aspirin  81 mg Oral or Feeding Tube Daily     atorvastatin  40 mg Oral QPM     bumetanide  3 mg Intravenous BID     captopril  6.25 mg Oral TID     digoxin  125 mcg Oral Daily     DULoxetine  30 mg Oral Daily     fiber modular (NUTRISOURCE FIBER)  1 packet Per Feeding Tube TID     fluticasone-salmeterol  2 puff Inhalation BID     [START ON 8/3/2022] fluticasone-vilanterol  1 puff Inhalation Daily     hydroxychloroquine  200 mg Oral BID     insulin aspart  1-12 Units Subcutaneous Q4H     [Held by provider] insulin glargine  20 Units Subcutaneous QAM AC     lidocaine  1 patch Transdermal Q24h    And     lidocaine   Transdermal Q8H CMAMY     melatonin  10 mg Oral QPM     multivitamins w/minerals  15 mL Per Feeding Tube Daily     OLANZapine  5 mg Oral BID     oxyCODONE  5 mg Oral or Feeding Tube Q8H     pantoprazole  40 mg Per Feeding Tube QAM AC     protein modular  1 packet Per Feeding Tube TID     sodium chloride (PF)  3 mL Intracatheter Q8H     traZODone  25 mg Oral At Bedtime    Or     traZODone  50 mg Oral At Bedtime     Warfarin Therapy Reminder  1 each Oral See Admin  Instructions

## 2022-07-29 NOTE — PROGRESS NOTES
CLINICAL NUTRITION SERVICES - BRIEF NOTE     Nutrition Prescription     Recommendations already ordered by Registered Dietitian (RD):  Transition to cycled TFs per primary team:   Osmolite 1.5 Virgilio @ 75 ml/hr x 16 hours (1200ml/day) + 1 pkt Prosource TID will provide: 1920 kcals (30 kcal/kg), 108 g PRO (1.7 g/kg), 914 ml free H20, 244 g CHO, and 0 g fiber daily per new dosing wt of 63 kg.      Future/Additional Recommendations:  Continue to monitor/document virgilio cts through weekend; will monitor results, ability to adjust/wean TFs if indicated pending PO intake  Monitor labs, glycemic control   For last full RD assessment, see note dated 7/26/22    NEW FINDINGS   - Transferred from ICU to [facility]. Primary team would like to cycle TFs while monitoring virgilio cts over weekend. Improved glycemic control. Will modify TF orders and discussed plan with bedside RN to shut off TFs in AM.     INTERVENTIONS  Implementation  Collaboration with other providers: Primary team, bedside RN  Enteral Nutrition - Modify to cycled TFs     Monitoring/Evaluation  Will continue to monitor and evaluate per protocol.    Luis Miller RDN, LD, [facility]  6B RD pager: [phone]   [address]B RD Phone: *[phone]

## 2022-07-29 NOTE — PROGRESS NOTES
Transfer  Transferred from:   Via:bed  Reason for transfer: Pt appropriate for 6B- improved patient condition  Belongings: Received with pt  Chart: Received with pt  Medications: Meds received from old unit with pt  Code Status verified on armband: yes  2 RN Skin Assessment Completed By: Myself and LITA Gil  Med rec completed: yes  Bed surface reassessed with algorithm and charted: yes  New bed surface ordered: no  Suction/Ambu bag/Flowmeter at bedside: yes    Report received from: LITA Dunn

## 2022-07-30 ENCOUNTER — APPOINTMENT (OUTPATIENT)
Dept: GENERAL RADIOLOGY | Facility: CLINIC | Age: 62
DRG: 001 | End: 2022-07-30
Attending: PHYSICIAN ASSISTANT
Payer: COMMERCIAL

## 2022-07-30 LAB
ABO/RH(D): NORMAL
ALBUMIN SERPL BCG-MCNC: 2.8 G/DL (ref 3.5–5.2)
ALP SERPL-CCNC: 195 U/L (ref 40–129)
ALT SERPL W P-5'-P-CCNC: 11 U/L (ref 10–50)
ANION GAP SERPL CALCULATED.3IONS-SCNC: 10 MMOL/L (ref 7–15)
ANTIBODY SCREEN: NEGATIVE
AST SERPL W P-5'-P-CCNC: 34 U/L (ref 10–50)
AT III ACT/NOR PPP CHRO: 90 % (ref 85–135)
BACTERIA BLD CULT: NO GROWTH
BACTERIA WND CULT: NO GROWTH
BILIRUB SERPL-MCNC: 0.5 MG/DL
BLD PROD TYP BPU: NORMAL
BLD PROD TYP BPU: NORMAL
BLOOD COMPONENT TYPE: NORMAL
BLOOD COMPONENT TYPE: NORMAL
BUN SERPL-MCNC: 20.7 MG/DL (ref 8–23)
CA-I BLD-MCNC: 4.3 MG/DL (ref 4.4–5.2)
CALCIUM SERPL-MCNC: 8.6 MG/DL (ref 8.8–10.2)
CHLORIDE SERPL-SCNC: 94 MMOL/L (ref 98–107)
CODING SYSTEM: NORMAL
CODING SYSTEM: NORMAL
CREAT SERPL-MCNC: 0.68 MG/DL (ref 0.67–1.17)
CROSSMATCH: NORMAL
CROSSMATCH: NORMAL
D DIMER PPP FEU-MCNC: 10.76 UG/ML FEU (ref 0–0.5)
DEPRECATED HCO3 PLAS-SCNC: 29 MMOL/L (ref 22–29)
ERYTHROCYTE [DISTWIDTH] IN BLOOD BY AUTOMATED COUNT: 18.7 % (ref 10–15)
FIBRINOGEN PPP-MCNC: 478 MG/DL (ref 170–490)
GFR SERPL CREATININE-BSD FRML MDRD: >90 ML/MIN/1.73M2
GLUCOSE BLDC GLUCOMTR-MCNC: 108 MG/DL (ref 70–99)
GLUCOSE BLDC GLUCOMTR-MCNC: 124 MG/DL (ref 70–99)
GLUCOSE BLDC GLUCOMTR-MCNC: 131 MG/DL (ref 70–99)
GLUCOSE BLDC GLUCOMTR-MCNC: 133 MG/DL (ref 70–99)
GLUCOSE BLDC GLUCOMTR-MCNC: 158 MG/DL (ref 70–99)
GLUCOSE BLDC GLUCOMTR-MCNC: 168 MG/DL (ref 70–99)
GLUCOSE SERPL-MCNC: 142 MG/DL (ref 70–99)
HCT VFR BLD AUTO: 22.5 % (ref 40–53)
HGB BLD-MCNC: 6.8 G/DL (ref 13.3–17.7)
INR PPP: 1.58 (ref 0.85–1.15)
ISSUE DATE AND TIME: NORMAL
ISSUE DATE AND TIME: NORMAL
LACTATE SERPL-SCNC: 1.7 MMOL/L (ref 0.7–2)
MAGNESIUM SERPL-MCNC: 2 MG/DL (ref 1.7–2.3)
MCH RBC QN AUTO: 28.8 PG (ref 26.5–33)
MCHC RBC AUTO-ENTMCNC: 30.2 G/DL (ref 31.5–36.5)
MCV RBC AUTO: 95 FL (ref 78–100)
PHOSPHATE SERPL-MCNC: 4.2 MG/DL (ref 2.5–4.5)
PLATELET # BLD AUTO: 439 10E3/UL (ref 150–450)
POTASSIUM SERPL-SCNC: 4.3 MMOL/L (ref 3.4–5.3)
PROT SERPL-MCNC: 6.4 G/DL (ref 6.4–8.3)
RBC # BLD AUTO: 2.36 10E6/UL (ref 4.4–5.9)
SODIUM SERPL-SCNC: 133 MMOL/L (ref 136–145)
SPECIMEN EXPIRATION DATE: NORMAL
UFH PPP CHRO-ACNC: <0.1 IU/ML
UNIT ABO/RH: NORMAL
UNIT ABO/RH: NORMAL
UNIT NUMBER: NORMAL
UNIT NUMBER: NORMAL
UNIT STATUS: NORMAL
UNIT STATUS: NORMAL
UNIT TYPE ISBT: 5100
UNIT TYPE ISBT: 5100
WBC # BLD AUTO: 11.7 10E3/UL (ref 4–11)

## 2022-07-30 PROCEDURE — 250N000013 HC RX MED GY IP 250 OP 250 PS 637: Performed by: ANESTHESIOLOGY

## 2022-07-30 PROCEDURE — 86850 RBC ANTIBODY SCREEN: CPT | Performed by: PHYSICIAN ASSISTANT

## 2022-07-30 PROCEDURE — 87040 BLOOD CULTURE FOR BACTERIA: CPT | Performed by: SURGERY

## 2022-07-30 PROCEDURE — 250N000013 HC RX MED GY IP 250 OP 250 PS 637: Performed by: STUDENT IN AN ORGANIZED HEALTH CARE EDUCATION/TRAINING PROGRAM

## 2022-07-30 PROCEDURE — 250N000013 HC RX MED GY IP 250 OP 250 PS 637: Performed by: SURGERY

## 2022-07-30 PROCEDURE — 85027 COMPLETE CBC AUTOMATED: CPT | Performed by: SURGERY

## 2022-07-30 PROCEDURE — 250N000011 HC RX IP 250 OP 636: Performed by: SURGERY

## 2022-07-30 PROCEDURE — 85520 HEPARIN ASSAY: CPT

## 2022-07-30 PROCEDURE — 272N000004 HC RX 272: Performed by: THORACIC SURGERY (CARDIOTHORACIC VASCULAR SURGERY)

## 2022-07-30 PROCEDURE — 71045 X-RAY EXAM CHEST 1 VIEW: CPT | Mod: 26 | Performed by: RADIOLOGY

## 2022-07-30 PROCEDURE — 250N000009 HC RX 250: Performed by: STUDENT IN AN ORGANIZED HEALTH CARE EDUCATION/TRAINING PROGRAM

## 2022-07-30 PROCEDURE — 83735 ASSAY OF MAGNESIUM: CPT | Performed by: SURGERY

## 2022-07-30 PROCEDURE — 36592 COLLECT BLOOD FROM PICC: CPT | Performed by: SURGERY

## 2022-07-30 PROCEDURE — 120N000003 HC R&B IMCU UMMC

## 2022-07-30 PROCEDURE — P9016 RBC LEUKOCYTES REDUCED: HCPCS | Performed by: NURSE PRACTITIONER

## 2022-07-30 PROCEDURE — 86901 BLOOD TYPING SEROLOGIC RH(D): CPT | Performed by: PHYSICIAN ASSISTANT

## 2022-07-30 PROCEDURE — 85610 PROTHROMBIN TIME: CPT | Performed by: SURGERY

## 2022-07-30 PROCEDURE — 74018 RADEX ABDOMEN 1 VIEW: CPT

## 2022-07-30 PROCEDURE — 83605 ASSAY OF LACTIC ACID: CPT | Performed by: SURGERY

## 2022-07-30 PROCEDURE — 999N000044 HC STATISTIC CVC DRESSING CHANGE

## 2022-07-30 PROCEDURE — 74018 RADEX ABDOMEN 1 VIEW: CPT | Mod: 26 | Performed by: RADIOLOGY

## 2022-07-30 PROCEDURE — 85384 FIBRINOGEN ACTIVITY: CPT | Performed by: SURGERY

## 2022-07-30 PROCEDURE — 71045 X-RAY EXAM CHEST 1 VIEW: CPT

## 2022-07-30 PROCEDURE — 99233 SBSQ HOSP IP/OBS HIGH 50: CPT | Mod: GC | Performed by: INTERNAL MEDICINE

## 2022-07-30 PROCEDURE — 84100 ASSAY OF PHOSPHORUS: CPT | Performed by: SURGERY

## 2022-07-30 PROCEDURE — 85300 ANTITHROMBIN III ACTIVITY: CPT | Performed by: SURGERY

## 2022-07-30 PROCEDURE — 86923 COMPATIBILITY TEST ELECTRIC: CPT | Performed by: NURSE PRACTITIONER

## 2022-07-30 PROCEDURE — 85379 FIBRIN DEGRADATION QUANT: CPT | Performed by: SURGERY

## 2022-07-30 PROCEDURE — 82330 ASSAY OF CALCIUM: CPT | Performed by: SURGERY

## 2022-07-30 PROCEDURE — 250N000013 HC RX MED GY IP 250 OP 250 PS 637: Performed by: PHYSICIAN ASSISTANT

## 2022-07-30 PROCEDURE — 250N000011 HC RX IP 250 OP 636: Performed by: STUDENT IN AN ORGANIZED HEALTH CARE EDUCATION/TRAINING PROGRAM

## 2022-07-30 PROCEDURE — 250N000013 HC RX MED GY IP 250 OP 250 PS 637: Performed by: INTERNAL MEDICINE

## 2022-07-30 PROCEDURE — 85018 HEMOGLOBIN: CPT | Performed by: THORACIC SURGERY (CARDIOTHORACIC VASCULAR SURGERY)

## 2022-07-30 PROCEDURE — 36592 COLLECT BLOOD FROM PICC: CPT | Performed by: THORACIC SURGERY (CARDIOTHORACIC VASCULAR SURGERY)

## 2022-07-30 PROCEDURE — 80053 COMPREHEN METABOLIC PANEL: CPT | Performed by: SURGERY

## 2022-07-30 RX ORDER — WARFARIN SODIUM 10 MG/1
10 TABLET ORAL
Status: DISCONTINUED | OUTPATIENT
Start: 2022-07-30 | End: 2022-07-30

## 2022-07-30 RX ORDER — OXYMETAZOLINE HYDROCHLORIDE 0.05 G/100ML
2 SPRAY NASAL ONCE
Status: COMPLETED | OUTPATIENT
Start: 2022-07-30 | End: 2022-07-30

## 2022-07-30 RX ORDER — WARFARIN SODIUM 10 MG/1
10 TABLET ORAL
Status: COMPLETED | OUTPATIENT
Start: 2022-07-30 | End: 2022-07-30

## 2022-07-30 RX ORDER — CAPTOPRIL 12.5 MG/1
12.5 TABLET ORAL 3 TIMES DAILY
Status: DISCONTINUED | OUTPATIENT
Start: 2022-07-30 | End: 2022-08-10

## 2022-07-30 RX ADMIN — HYDROXYZINE HYDROCHLORIDE 25 MG: 25 TABLET ORAL at 04:11

## 2022-07-30 RX ADMIN — DIGOXIN 125 MCG: 125 TABLET ORAL at 07:46

## 2022-07-30 RX ADMIN — HYDROXYCHLOROQUINE SULFATE 200 MG: 200 TABLET, FILM COATED ORAL at 07:46

## 2022-07-30 RX ADMIN — Medication 10 MG: at 20:14

## 2022-07-30 RX ADMIN — Medication 1 PACKET: at 20:16

## 2022-07-30 RX ADMIN — TRAZODONE HYDROCHLORIDE 50 MG: 50 TABLET ORAL at 21:53

## 2022-07-30 RX ADMIN — OLANZAPINE 5 MG: 5 TABLET, FILM COATED ORAL at 20:12

## 2022-07-30 RX ADMIN — OXYCODONE HYDROCHLORIDE 5 MG: 5 TABLET ORAL at 04:10

## 2022-07-30 RX ADMIN — OXYMETAZOLINE HYDROCHLORIDE 2 SPRAY: 0.05 SPRAY NASAL at 03:54

## 2022-07-30 RX ADMIN — FLUTICASONE PROPIONATE AND SALMETEROL XINAFOATE 2 PUFF: 115; 21 AEROSOL, METERED RESPIRATORY (INHALATION) at 21:18

## 2022-07-30 RX ADMIN — Medication 1 PACKET: at 14:08

## 2022-07-30 RX ADMIN — INSULIN ASPART 2 UNITS: 100 INJECTION, SOLUTION INTRAVENOUS; SUBCUTANEOUS at 20:11

## 2022-07-30 RX ADMIN — DULOXETINE 30 MG: 30 CAPSULE, DELAYED RELEASE ORAL at 08:18

## 2022-07-30 RX ADMIN — ACETAMINOPHEN 975 MG: 325 TABLET, FILM COATED ORAL at 00:25

## 2022-07-30 RX ADMIN — ATORVASTATIN CALCIUM 40 MG: 40 TABLET, FILM COATED ORAL at 20:11

## 2022-07-30 RX ADMIN — ACETAMINOPHEN 975 MG: 325 TABLET, FILM COATED ORAL at 16:20

## 2022-07-30 RX ADMIN — BUMETANIDE 3 MG: 0.25 INJECTION, SOLUTION INTRAMUSCULAR; INTRAVENOUS at 07:56

## 2022-07-30 RX ADMIN — FLUTICASONE PROPIONATE AND SALMETEROL XINAFOATE 2 PUFF: 115; 21 AEROSOL, METERED RESPIRATORY (INHALATION) at 07:46

## 2022-07-30 RX ADMIN — MULTIVITAMIN 15 ML: LIQUID ORAL at 07:46

## 2022-07-30 RX ADMIN — OLANZAPINE 5 MG: 5 TABLET, FILM COATED ORAL at 07:46

## 2022-07-30 RX ADMIN — ACETAMINOPHEN 975 MG: 325 TABLET, FILM COATED ORAL at 07:46

## 2022-07-30 RX ADMIN — ONDANSETRON 4 MG: 2 INJECTION INTRAMUSCULAR; INTRAVENOUS at 00:35

## 2022-07-30 RX ADMIN — HYDROXYZINE HYDROCHLORIDE 50 MG: 25 TABLET ORAL at 00:25

## 2022-07-30 RX ADMIN — THROMBIN HUMAN 1 KIT: 2000 POWDER, FOR SOLUTION TOPICAL at 07:00

## 2022-07-30 RX ADMIN — OXYCODONE HYDROCHLORIDE 5 MG: 5 TABLET ORAL at 13:05

## 2022-07-30 RX ADMIN — WARFARIN SODIUM 10 MG: 10 TABLET ORAL at 23:01

## 2022-07-30 RX ADMIN — Medication 1 PACKET: at 07:47

## 2022-07-30 RX ADMIN — CAPTOPRIL 12.5 MG: 12.5 TABLET ORAL at 14:08

## 2022-07-30 RX ADMIN — HYDROXYZINE HYDROCHLORIDE 25 MG: 25 TABLET ORAL at 13:09

## 2022-07-30 RX ADMIN — BUMETANIDE 3 MG: 0.25 INJECTION, SOLUTION INTRAMUSCULAR; INTRAVENOUS at 17:47

## 2022-07-30 RX ADMIN — INSULIN ASPART 1 UNITS: 100 INJECTION, SOLUTION INTRAVENOUS; SUBCUTANEOUS at 04:17

## 2022-07-30 RX ADMIN — LIDOCAINE PATCH 4% 1 PATCH: 40 PATCH TOPICAL at 07:47

## 2022-07-30 RX ADMIN — OXYCODONE HYDROCHLORIDE 5 MG: 5 TABLET ORAL at 18:47

## 2022-07-30 RX ADMIN — Medication 40 MG: at 08:16

## 2022-07-30 RX ADMIN — HYDROXYCHLOROQUINE SULFATE 200 MG: 200 TABLET, FILM COATED ORAL at 20:13

## 2022-07-30 RX ADMIN — Medication 6.25 MG: at 07:46

## 2022-07-30 ASSESSMENT — ACTIVITIES OF DAILY LIVING (ADL)
ADLS_ACUITY_SCORE: 40

## 2022-07-30 NOTE — PROGRESS NOTES
Calorie Count  Intake recorded for: 7/29  Total Kcals: 429 Total Protein: 17g  Kcals from Hospital Food: 429   Protein: 17g  Kcals from Outside Food (average): 0  Protein: 0g  # Meals Ordered from Kitchen: 3 meals  # Meals Recorded: 2 meals (1 - 100% 8oz orange juice, grapes, 50% 1 garcia slice)      (2 - <25% Quesadilla w/ chicken)  # Supplements Recorded: 50% 1 Ensure Enlive Chocolate

## 2022-07-30 NOTE — PROGRESS NOTES
"S: Was called to patient's bedside due to epistaxis and \"coughing up blood\". Patient reports this has been occurring for hours and hours but nursing endorses about twenty minutes of continuous nosebleed. Patient reports he has had to have his nose packed twice in the past.    O:   BP 96/84 (BP Location: Left arm)   Pulse 114   Temp 97.3  F (36.3  C) (Oral)   Resp 18   Ht 1.702 m (5' 7\")   Wt 69.8 kg (153 lb 12.8 oz)   SpO2 93%   BMI 24.09 kg/m       INR 1.37    Gen: alert and visibly anxious adult male in bed with blood visible on face, neck, and chest. Suctioning his own mouth with sanguinous output. Many tissues in the nearby trash can with spotty bloody strikethrough. <10cc of bloody output in suction bottle  HEENT: Bleeding from both nostrils L>R. Some clot protruding from the left nare. Airway intact with no hemoptysis    A/P:Epistaxis without hemoptysis. Some blood running to oropharynx from the nose. Heparin ggt was held. After discussion with ENT and informing the fellow on call afrin was applied to both nostrils and pressure was held over the soft tissues of the nose. After 30 minutes of this being ineffective ENT was called again. They reported they would come up to pack the nares. One half-dose of patient's upcoming atarax dose was given early due to significant anxiety during pressure application to his nostril.    David Anthony MD  Intern, General Surgery  "

## 2022-07-30 NOTE — PROGRESS NOTES
1124:  [place] to notify that patient's Hgb resulted back at 6.8 and INR 1.58.  Heparin drip remains on hold.  Questioned if she wanted to continue to hold drip and if patient can received AM 81 mg of Asprin.

## 2022-07-30 NOTE — PLAN OF CARE
"Neuro: Alert, oriented x4 at start of shift, oriented x2 and confused throughout night. Sitter at bedside reintroduced overnight. Other neuros WDL.   Cardiac: Sinus tach with bundle branch block. HM3. BP 96/84 (BP Location: Left arm)   Pulse 117   Temp 97.5  F (36.4  C) (Axillary)   Resp 24   Ht 1.702 m (5' 7\")   Wt 68.6 kg (151 lb 3.8 oz)   SpO2 100%   BMI 23.69 kg/m     Respiratory: 2L NC transitioned to oxymask d/t nose bleed overnight, SOB and increased O2 needs with activity.  GI/: Adequate urine output with marcelo. Multiple loose incontinent stools this shift.  Diet/appetite: Reg diet with poor appetite. TF running at 50ml/hr with FWF 30 ml Q4, paused overnight d/t coughing/gagging with nose bleed  Activity:  Assist of 2 with gait belt and walker  Pain: Denies pain most of shift but admits to 6/10 back pain after fall incident  Skin: No new deficits noted. Infiltration site appears to be healing, dressings CDI, blanchable red coccyx.  LDA's:  PICC SL - paused Heparin gtt overnight d/t nose bleed with provider approval at bedside. NJ bridle removed per ENT, Marcelo in place.     Plan: Continue with POC. Notify primary team with changes.      "

## 2022-07-30 NOTE — PROGRESS NOTES
Cardiology 2 Progress Note    Assessment & Plan   Dandy Sands is a 61 year old male with PMH of lupus, antiphospholipid syndrome, HTN, HLD, HFrEF 2/2 ICM who presented with cardiogenic shock c/b multiorgan failure (JOSE, shock liver) requiring mechanical support with IABP, now s/p HM3 LVAD 7/8/22 by Dr. Zamora with intraoperative course c/b RV failure s/p 29F Protek duo via RIJ. Post op course c/b hemopericardium s/p repeat washout with chest left open. Chest closed 7/13/22 and decannulated from RVAD 7/21     #HFrEF 2/2 ICM s/p HM3 LVAD in setting of cardiogenic shock (SCAI D)  #RV failure s/p Protek duo temporary RVAD s/p decannulation 7/21  #RV thrombus  #Post chest closure hemopericardium s/p repeat washout (7/12)  Patient with ICM s/p HM3 LVAD 7/8/22 by Dr. Zamora in setting of presentation for cardiogenic shock requiring MCS with IABP as prior to HM (initially evaluated for heart transplant, however noted to have substantially elevated DSAs during course of care, so decision made to proceed with LVAD given patient already on temporary MCS). Intraoperative course c/b RV failure resulting in cardiogenic shock requiring high dose pressors necessitating RVAD support. Initial post-op course c/b hemopericardium requiring repeat washout with chest left open, however has since recovered well with decannulation on 7/21 with extubation day prior. Has continued to tolerate well from hemodynamic and end organ standpoint. Continuing to work on pulmonary toilet and diuresis to improve his oxygenation and total body fluid status.   -LVAD: continues to have good flows, no alarms and stable end organ perfusion; continue speed at 5300  -continue digoxin for RV support  -AC, antiplatelets: continue ASA; heparin high intensity on hold given epistaxis, defer to surgery when it would be appropriate to restart; warfarin started and awaiting therapeutic level  -Volume status: appears slightly hypervolemic; bumex 3mg IV BID with  "goal negative 500cc to 1L  -rhythm: sinus              -of note, patient with climbing capture threshold of RV lead; will need to be evaluated in future by EP team  -blood pressure: Increase captopril to 12.5mg TID  -encourage ICS, pulmonary toilet  -delirium precautions     The patient's care was discussed with the Attending Physician, Dr. Faustin, Bedside Nurse and Primary team    Antonio Kline MD  Cardiology Fellow  [phone]    Interval History   Epistaxis overnight. Otherwise feels well.     Physical Exam   Temp: 97.4  F (36.3  C) Temp src: Axillary BP: (!) 82/63 Pulse: 113   Resp: 30 SpO2: 93 % O2 Device: None (Room air) Oxygen Delivery: 3 LPM  Vitals:    07/28/22 0500 07/29/22 0319 07/30/22 0600   Weight: 72.6 kg (160 lb 0.9 oz) 69.8 kg (153 lb 12.8 oz) 68.6 kg (151 lb 3.8 oz)     Vital Signs with Ranges  Temp:  [97.3  F (36.3  C)-97.7  F (36.5  C)] 97.4  F (36.3  C)  Pulse:  [113-117] 113  Resp:  [16-32] 30  BP: ()/(35-93) 82/63  SpO2:  [92 %-100 %] 93 %  I/O last 3 completed shifts:  In: 1763.71 [P.O.:118; I.V.:275.71; NG/GT:520]  Out: 3425 [Urine:3425]     , Blood pressure (!) 82/63, pulse 113, temperature 97.4  F (36.3  C), resp. rate 30, height 1.702 m (5' 7\"), weight 68.6 kg (151 lb 3.8 oz), SpO2 93 %.  151 lbs 3.77 oz  General Appearance:  Older male in NAD breathing on NC  Respiratory: coarse lung sounds, nonlabored  Cardiovascular: vad hum, driveline site c/d/i, chest incisions c/d/i, JVD ~10 cm   GI: soft, nt, bs active  Skin: warm, well perfused, trace diffuse edema  Neuro: awake, alert, interactive, speech fluent, moving all 4       Medications     dextrose       heparin Stopped (07/30/22 0335)       acetaminophen  975 mg Oral or Feeding Tube Q8H CAMMY     aspirin  81 mg Oral or Feeding Tube Daily     atorvastatin  40 mg Oral QPM     bumetanide  3 mg Intravenous BID     captopril  12.5 mg Oral TID     digoxin  125 mcg Oral Daily     DULoxetine  30 mg Oral Daily     fiber modular " (NUTRISOURCE FIBER)  1 packet Per Feeding Tube TID     fluticasone-salmeterol  2 puff Inhalation BID     [START ON 8/3/2022] fluticasone-vilanterol  1 puff Inhalation Daily     hydroxychloroquine  200 mg Oral BID     insulin aspart  1-12 Units Subcutaneous Q4H     [Held by provider] insulin glargine  20 Units Subcutaneous QAM AC     lidocaine  1 patch Transdermal Q24h    And     lidocaine   Transdermal Q8H CAMMY     lidocaine 3%, phenylephrine 0.25% solution for irrigation  5 mL Irrigation Once     melatonin  10 mg Oral QPM     multivitamins w/minerals  15 mL Per Feeding Tube Daily     OLANZapine  5 mg Oral BID     oxidized cellulose   Topical Once     oxyCODONE  5 mg Oral or Feeding Tube Q8H     pantoprazole  40 mg Per Feeding Tube QAM AC     protein modular  1 packet Per Feeding Tube TID     silver nitrate   Topical Once     traZODone  25 mg Oral At Bedtime    Or     traZODone  50 mg Oral At Bedtime     Warfarin Therapy Reminder  1 each Oral See Admin Instructions     warfarin-No DOSE today  1 each Does not apply no dose today (warfarin)       Data   Recent Labs   Lab 07/30/22  1149 07/30/22  1009 07/30/22  0735 07/29/22  0723 07/29/22  0525 07/28/22  1547 07/28/22  1431 07/28/22  0800 07/28/22  0419   WBC  --  11.7*  --   --  11.3*  --   --   --  9.9   HGB  --  6.8*  --   --  8.4*  --  8.5*  --  7.1*   MCV  --  95  --   --  95  --   --   --  95   PLT  --  439  --   --  366  --   --   --  348   INR  --  1.58*  --   --  1.37*  --   --   --  1.29*   NA  --  133*  --   --  135*  --   --   --  134*   POTASSIUM  --  4.3  --   --  4.3  --  4.1  --  4.7   CHLORIDE  --  94*  --   --  92*  --   --   --  94*   CO2  --  29  --   --  33*  --   --   --  33*   BUN  --  20.7  --   --  16.9  --   --   --  18.0   CR  --  0.68  --   --  0.58*  --   --   --  0.58*   ANIONGAP  --  10  --   --  10  --   --   --  7   ELDA  --  8.6*  --   --  8.8  --   --   --  8.4*   * 142* 133*   < > 115*   < >  --    < > 119*   ALBUMIN  --  2.8*   --   --  2.9*  --   --   --  2.7*   PROTTOTAL  --  6.4  --   --  6.6  --   --   --  6.2*   BILITOTAL  --  0.5  --   --  0.5  --   --   --  0.5   ALKPHOS  --  195*  --   --  230*  --   --   --  223*   ALT  --  11  --   --  13  --   --   --  13   AST  --  34  --   --  34  --   --   --  37    < > = values in this interval not displayed.       Recent Results (from the past 24 hour(s))   XR Chest Port 1 View    Narrative    EXAM: XR CHEST PORT 1 VIEW  7/30/2022 9:21 AM     HISTORY:  interval, monitor right effusion       COMPARISON:  Chest radiograph 7/28/2022    FINDINGS:     Portable upright view of the chest. Right upper extremity PICC tip  projects over the mid SVC. Stable position of left chest wall  implantable cardiac defibrillator. LVAD in place. Intact median  sternotomy wires. Feeding tube tip courses below the diaphragm and  beyond the field of view. Intact medial sternotomy wires.    Trachea is midline. Stable cardiomegaly. Decreased aeration of the  lungs. No significant change in mixed airspace opacities. Increased  size of moderate right pleural effusion. Small left pleural effusion.  No appreciable pneumothorax.    No acute osseous abnormality. Visualized upper abdomen is  unremarkable.        Impression    IMPRESSION:     1. Cardiomegaly with stable pulmonary opacities, likely representing  edema. Low lung volumes.  2. Increased size of moderate right pleural effusion.  3. Right upper extremity PICC tip projects over the mid SVC. Other  support devices similar position.    I have personally reviewed the examination and initial interpretation  and I agree with the findings.    LAI HERNADEZ MD         SYSTEM ID:  [id number]   XR Abdomen Port 1 View    Narrative    EXAM: XR ABDOMEN PORT 1 VIEW, 7/30/2022 11:30 AM    INDICATION: CONFIRM NJ PLACEMENT    COMPARISON: Abnormal radiographs 7/20/2022    FINDINGS:  Portable upright view of the abdomen. Feeding tube tip projects over  the expected location of the  proximal jejunum.. Nonobstructive bowel  gas pattern. No free intraperitoneal air, pneumatosis or portal venous  gas. Similar position of IVC filter projecting over the location of  the IVC. No acute or suspicious bone abnormalities.     Visualized median sternotomy wires are intact. LVAD in place. The  position of pacemaker leads. Small pleural effusions, right greater  than left.       Impression    IMPRESSION:   1. Feeding tube tip project over expected location of the proximal  jejunum.  2. Nonobstructive bowel gas pattern.  3. Small bilateral pleural effusions, right greater than left.    I have personally reviewed the examination and initial interpretation  and I agree with the findings.    LAI HERNADEZ MD         SYSTEM ID:  [id number]

## 2022-07-30 NOTE — PROVIDER NOTIFICATION
"7/29  3683 Paged David Anthony re pt fall - provider came to bedside, RN explained pt was vitally stable, no visual evidence of injury, did not appear to have hit head. Provider advised to watch for hematomas, no further interventions at this time.     4450 Paged David Anthony \"Pt Dandy Mitzel 6B FYI new nose bleed unable to stop, now coughing up bloody drainage. On 2-4L oxymask and heparin gtt, any interventions?\"    Provider came to bedside, RN explained had been holding pressure for around 30 mins, suctioning mouth as patient coughing blood up. Provider ordered Afrin nasal spray. Pt also having anxiety attack, provider gave OK to give prn atarax dose early at bedside.   "

## 2022-07-30 NOTE — PROGRESS NOTES
Brief ENT Progress Note    Subj/intvl: Called by general surgery resident regarding recurrent epistaxis. Pressure applied with afrin, unsuccessful. Patient feels like the blood is coming from both sides, and he has been coughing up monika bloody sputum and clots.    Obj:  General - lying in bed, mildly uncomfortable  HEENT - blood clots coming from nares bilaterally. Rigid scope exam below.  Resp - breathing comfortably on room air    NASAL ENDOSCOPY: Due to epistaxis, nasal endoscopy was indicated. After obtaining consent, the rigid zero degree scope was passed under endoscopic vision through the right nasal passage. The nasal mucosa was pink and moist. No bleeding noted in the right nasal cavity. The NG tube bridle is inappropriately positioned, passing through a 1cm anterior septal perforation and there is an area of macerated septal mucosa. The scope was then passed through the left nasal passage, bleeding noted in multiple regions around the septal perforation, with the most robust bleeding noted superiorly in the nasal cavity.    PROCEDURE:  Blood and clots suctioned from nasopharynx. An afrin soaked piece of gauze was applied to the bleeding area. The area was then cauterized lightly with silver nitrate. This was unsuccessful in stopping the bleeding, so intranasal topical thrombin was placed and pressure was held over the bleeder with afrin soaked gauze. The left nasal cavity was then packed with surgifoam wrapped in surgicel, with the packing directed superiorly in the nasal cavity. The left nasal cavity was then coated with surgiflo. The patient was then observed for 15 minutes with no further epistaxis noted. He gargled water and no further blood was seen dripping down the posterior oropharynx.     A&P:   61-year-old male with a history of CAD, ischemic CM,DVT/PE, antiphospholipid syndrome, on a heparin drip, seen previously in consultation by ENT just over 1 month ago and absorbable packing was placed, but  this has dissolved and he has recurrent epistaxis. S/p packing and NG tube bridle removal for incorrect placement through the patient's septal perforation.    - Left nasal cavity packed with all absorbable packing - surgifoam + surgicel + surgiflo. No antibiotics are indicated due to absorbable packing.  - Begin with nasal saline q3h while awake starting on 8/2  - Some slow oozing is to be expected, if begins to  in quantity of bleeding would recommend spraying the nasal cavity copiously with afrin and holding pressure over the soft part of the nose for 20 minutes continuously.   - If bleeding continues despite these measures, or is severe, please contact ENT.  - NG tube now secured by adhesive dressing to the right cheek. If patient requires continued enteral access via NG, would recommend ensuring bridle is placed all the way behind the septum, not through the patient's anterior septal perforation.    Sunitha Carpenter MD   Otolaryngology-Head & Neck Surgery PGY2  Please contact ENT on call with questions

## 2022-07-30 NOTE — PLAN OF CARE
Neuro: A&Ox3-4; intermittently anxious w/noted delirium at night. Follows commands.  Uses call light during the day.  1:1 sitter discontinued.  Cardiac: SR. VSS. Declinec chest pain. LVAD hum noted.   Respiratory: Sating above 90% on RA, however, pt complained of SOB and difficulty catching breathe (primary team aware), so remained on 2 L for extra support/comfort.  Lung sounds coarse to clear. Continue with IV bumex.  GI/: Good urine output via marcelo. Several loose/watery stools noted t/o shift.  Intermittent nausea-increases w/movement.  Diet/appetite: Tolerating regular diet. Eating poorly.  TF at 50 ml/hr.  Plan to cycle TF on 7/30 starting at 1800.  Activity:  Assist of 1-2x up to chair during shift.  PT/OT following.  Pain: At acceptable level on current regimen. Scheduled Tylenol given.  Skin: No new deficits noted.  Redden coccyx.  Dressings C/D/I  LDA's: 3x Right PICC lumen.  Heparin infusing at 1,750 uints/hr-Xa check in AM on 7/30, LVAD WDL, davian, and NJ      Plan: Continue with POC. Notify primary team with changes.    (2) probably inadequate

## 2022-07-30 NOTE — PROGRESS NOTES
[facility] ICU PROGRESS NOTE  07/30/2022        CO-MORBIDITIES  1. Cardiogenic shock (H)  (primary encounter diagnosis)  2. Ischemic cardiomyopathy  3. Heart failure with reduced ejection fraction, NYHA class III (H)  4. Coronary artery disease involving native coronary artery of native heart without angina pectoris      ASSESSMENT Dandy Sands is a 61 year old male with a PMH of CAD s/p PCI to LAD, MI, ICM, acute on chronic heart failure, s/p CRT-D, severe MR, antiphospholipid antibody syndrome on chronic warfarin, SLE, HTN, HLD, recent hospitalization 5/16-5/26 s/p RCA, LAD stenting who underwent RVAD (29F Protek duo RIJ) LVAD placement (HeartMate 3) on 7/8/22 by Dr. Zamora. Intraoperative course complicated by IABP dysfunction and RV failure, requiring crash onto bypass / vasopressor support, NO, and protek placement. Now s/p chest closure and NO wean 7/11. Return to OR for hemopericardium (7/12) and chest re-closure 7/13. Protek RVAD removed 7/21.    Today's progress  2 units PRBC for hgb of 6.8, recheck ordered.   Diuresis per cardiology   Nasal packing per ENT  Hold heparin until tomorrow with bleeding       PLAN:   Neuro/ pain/ sedation:  #Acute Postoperative pain  #Depression   #Anxiety due to a medical condition  #Insomnia  #Delerium   Delirious intermittently.   - Monitor neurological status. Notify the MD for any acute changes in exam.  - Pain:    -Scheduled tylenol, uriel oxy 5mg q8h, wean off scheduled Oxy 7/29   -PRN oxycodone 5mg q6h  - Depression: Duloxetine 30mg  - Sleep: Melatonin 10 mg HS   - discontinue Seroquel 100 mg HS   - start trazodone    - Zyprexa 5mg BID  - Anxiety: Valproate po 250 mg BID and 500mg Bedtime   - Hydroxyzine PRN  - PTA meds: hydroxyzine 25mg PRN, zolpidem 5mg, melatonin 3mg, lorazepam 0.5mg  - QTc(7/18)- 683 msec, no haldol     Pulmonary:   #Acute hypoxic respiratory failure on iMV  #Mild-moderate emphysema  #History of COVID-19  Nasal cannula 4L   - Maintain SpO2> 92%  -  Incentive spirometry     Cardiovascular:    #S/p LVAD placement (HeartMate 3) on 7/8/22   #S/p RVAD (29F Protek duo RIJ) on 7/8/22  #S/p chest closure 7/11  #Cardiogenic shock  #Advanced ischemic cardiomyopathy, class IV, stage D  #CAD s/p PCI to LAD (2005)  #S/p CRT-D (2006)  #History of MI (2007)  #Severe MR  #Antiphospholipid antibody syndrome on chronic warfarin  #HTN, HLD  #Recent hospitalization 5/16-5/26 s/p RCA, LAD stenting  #Atrial fibrillation   # RV mobile clot   - Monitor hemodynamic status. Chest closure and oxygenator splicing 7/11. Reopened 7/12 for I&D and left open, closed 7/13. RVAD removed 7/21.  - Goal MAP > 65   - PTA meds(held): clopidogrel 75mg, lasix 40mg, warfarin 5mg  - LVAD management per Advanced HF service  - AC Plan: High intensity Heparin for RV mobile thrombus  - Aspirin 81 mg  - Atorvastatin 40mg   - Hydralazine uriel and prn MAP >85 discontinued  - Captopril 6.25mg TID dose titration per cardiology   - Warfarin 7/24 per pharmacy    GI / Nutrition:   #GERD  #Congestive hepatopathy in the setting of cardiac insuffiencey -resolved  #Hematemesis / oral mucosal bleeding -resolved    - Full liquid diet, SLP following  - Continue NJT at goal   - Nutrition following  - Bowel regimen prn (miralax / senna)  - Trend LFT's every other day    Renal/ Fluid Balance:    - Strict I/O, daily weights   - Avoid/limit nephrotoxins as able  - Replete lytes PRN per protocol  - PTA meds: tamsulosin 0.4mg (held)  - Goal net negative ~ 1L   Bumex gtt for part of 7/24: total UOP 8L, gtt stopped   Bumex 4mg x2 7/25 with over 6L UOP  - Bumex 3mg x3 7/27 and 7/28 with good diuresis, continue bumex 3 mg IV BID today per cards, already net negative 1L today    Endocrine:    #Stress induced hyperglycemia  Preop A1c 5.5%  - Insulin glargine 20U and high sliding scale  - Goal BG <180 for optimal healing   - Holding glargine 7/29 in anticipation of possibly cycling tube feeding, has had minimal insulin requirements  7/29 with TF running. Will continue to hold and plan to start cycle feeds 7/30, may need NPH if BG starts to elevate    ID/ Antibiotics:  #Periopearive antibiosis (s/p course of Cefepime, vancomycin, rifampin, fluconazole)  #HAP - Enterococcus faecalis PNA, s/p treatment   - 7/12 Pan culture: Bcx x2, UA/UC -> negative  - 7/14 Endotracheal sputum culture (4+ candida albicans, 1+ E.Coli, 3+ enterococcus faecalis)   - Zosyn for HAP(7/15-7/22)  - 7/18 C.diff- negative  - 7/25 Blood cultures drawn for unexplained hypotension   - NGTD    Heme:     #Acute blood loss anemia  #Acute blood loss thrombocytopenia  #History of DVT and PE   #Antiphospholipid antibody syndrome  #History of iron deficiency anemia  - Monitor for s/sx of bleeding  - Trend CBC and coags  - Hgb goal >8   Hgb stable 8s, no signs or symptoms of bleeding  - Plt goal > 20  - Nose bleed, lost 2 units of blood. Transfused for hgb 6.8, 2U received. Recheck ordered.     Rheumatology:  #SLE  - Chronic: symptoms include arthritis, photosensitivity, fatigue, and skin manifestations  - PTA meds: hydroxychloroquine 200mg, resumed 7/16  - Rheumatology consulted and following. Follow dsDNA and complement levels(7/18)    -Will need cellcept restarted when appropriate.    Prophylaxis:    - DVT: SCD + high intensity heparin-held for bleeding + warfarin   - PPI    Disposition:  - Transfer to  on 7/28     Discussed with Dr. Julieta Gilman PA-C  Cardiothoracic Surgery  Pager [phone]  July 30, 2022    ----------------------------------------------------------------    Interval Events:  Nightmare during night and nursing found him sitting next to bed on floor. He reports only is butt was hurt. Examined by Intern and no acute findings. Did not hit head or LOC. Nose bleed during night. ENT had to pack at bedside. Hgb 6.8 this AM. Continues to have some SOB whenever up and moving, otherwise its okay. Appetite is improving. Pain  controlled.    OBJECTIVE:   1. VITAL SIGNS:   Temp:  [97.3  F (36.3  C)-97.7  F (36.5  C)] 97.4  F (36.3  C)  Pulse:  [113-117] 113  Resp:  [16-32] 30  BP: ()/(35-93) 82/63  SpO2:  [92 %-100 %] 93 %  Resp: 30      2. INTAKE/ OUTPUT:   I/O last 3 completed shifts:  In: 1763.71 [P.O.:118; I.V.:275.71; NG/GT:520]  Out: 3425 [Urine:3425]    3. PHYSICAL EXAMINATION:   General: sitting in chair, comfortable  Neuro: follow commands, intermittent delerium  Resp: on 4L NC  CV: tachycardic   Abdomen: Soft, mildly distended, Non-tender  Incisions: c/d/i  Extremities: warm and well perfused  LVAD Flow 3.3-3.8, PI 4.1-6.1, Speed 5300, Power 3.7-3.9    4. INVESTIGATIONS:   Arterial Blood Gases   Recent Labs   Lab 07/25/22  2109 07/25/22  0914   PH 7.50* 7.50*   PCO2 46* 46*   PO2 140* 91   HCO3 36* 35*     Complete Blood Count   Recent Labs   Lab 07/30/22  1009 07/29/22  0525 07/28/22  1431 07/28/22  0419 07/27/22  0338   WBC 11.7* 11.3*  --  9.9 9.1   HGB 6.8* 8.4* 8.5* 7.1* 8.2*    366  --  348 311     Basic Metabolic Panel  Recent Labs   Lab 07/30/22  1624 07/30/22  1149 07/30/22  1009 07/30/22  0735 07/29/22  0723 07/29/22  0525 07/28/22  1547 07/28/22  1431 07/28/22  0800 07/28/22  0419 07/27/22  0806 07/27/22  0338   NA  --   --  133*  --   --  135*  --   --   --  134*  --  132*   POTASSIUM  --   --  4.3  --   --  4.3  --  4.1  --  4.7   < > 4.1   CHLORIDE  --   --  94*  --   --  92*  --   --   --  94*  --  92*   CO2  --   --  29  --   --  33*  --   --   --  33*  --  31*   BUN  --   --  20.7  --   --  16.9  --   --   --  18.0  --  20.2   CR  --   --  0.68  --   --  0.58*  --   --   --  0.58*  --  0.70   * 108* 142* 133*   < > 115*   < >  --    < > 119*   < > 135*    < > = values in this interval not displayed.     Liver Function Tests  Recent Labs   Lab 07/30/22  1009 07/29/22  0525 07/28/22  0419 07/27/22  0338   AST 34 34 37 32   ALT 11 13 13 13   ALKPHOS 195* 230* 223* 225*   BILITOTAL 0.5 0.5 0.5 0.6    ALBUMIN 2.8* 2.9* 2.7* 2.6*   INR 1.58* 1.37* 1.29* 1.39*     Pancreatic Enzymes  No lab results found in last 7 days.  Coagulation Profile  Recent Labs   Lab 07/30/22  1009 07/29/22  0525 07/28/22  0419 07/27/22  0338 07/24/22  1639 07/24/22  0953 07/24/22  0405 07/23/22  2124   INR 1.58* 1.37* 1.29* 1.39*   < > 1.16* 1.22* 1.23*   PTT  --   --   --   --   --  100* >240* 105*    < > = values in this interval not displayed.         5. RADIOLOGY:   Recent Results (from the past 24 hour(s))   XR Chest Port 1 View    Narrative    EXAM: XR CHEST PORT 1 VIEW  7/30/2022 9:21 AM     HISTORY:  interval, monitor right effusion       COMPARISON:  Chest radiograph 7/28/2022    FINDINGS:     Portable upright view of the chest. Right upper extremity PICC tip  projects over the mid SVC. Stable position of left chest wall  implantable cardiac defibrillator. LVAD in place. Intact median  sternotomy wires. Feeding tube tip courses below the diaphragm and  beyond the field of view. Intact medial sternotomy wires.    Trachea is midline. Stable cardiomegaly. Decreased aeration of the  lungs. No significant change in mixed airspace opacities. Increased  size of moderate right pleural effusion. Small left pleural effusion.  No appreciable pneumothorax.    No acute osseous abnormality. Visualized upper abdomen is  unremarkable.        Impression    IMPRESSION:     1. Cardiomegaly with stable pulmonary opacities, likely representing  edema. Low lung volumes.  2. Increased size of moderate right pleural effusion.  3. Right upper extremity PICC tip projects over the mid SVC. Other  support devices similar position.    I have personally reviewed the examination and initial interpretation  and I agree with the findings.    LAI HERNADEZ MD         SYSTEM ID:  [id number]   XR Abdomen Port 1 View    Narrative    EXAM: XR ABDOMEN PORT 1 VIEW, 7/30/2022 11:30 AM    INDICATION: CONFIRM NJ PLACEMENT    COMPARISON: Abnormal radiographs  7/20/2022    FINDINGS:  Portable upright view of the abdomen. Feeding tube tip projects over  the expected location of the proximal jejunum.. Nonobstructive bowel  gas pattern. No free intraperitoneal air, pneumatosis or portal venous  gas. Similar position of IVC filter projecting over the location of  the IVC. No acute or suspicious bone abnormalities.     Visualized median sternotomy wires are intact. LVAD in place. The  position of pacemaker leads. Small pleural effusions, right greater  than left.       Impression    IMPRESSION:   1. Feeding tube tip project over expected location of the proximal  jejunum.  2. Nonobstructive bowel gas pattern.  3. Small bilateral pleural effusions, right greater than left.    I have personally reviewed the examination and initial interpretation  and I agree with the findings.    LAI HERNADEZ MD         SYSTEM ID:  [id number]       =========================================

## 2022-07-30 NOTE — PROGRESS NOTES
PageMoses Waite MD at 1823 to notify that patient is having coffee ground/dark/bloody stools. Recheck Hgb is ordered for 1815, however, second transfusions has not been completed yet.      MD Mike stated to infuse second PRBC order as ordered and recheck Hgb 30 min after second infusion is complete. Acknowledged stools.  Continue to monitor and follow up on Hgb recheck.

## 2022-07-30 NOTE — PROVIDER NOTIFICATION
Yas TRIMBLE spoke with charge RN to notify that they will be ordering for type and screen, along with order to transfuse 2 units of PRBCs.    Continue to keep Heparin drip on hold.

## 2022-07-30 NOTE — PROGRESS NOTES
0812: RUI Sorenson rounding in room.    Made aware that heparin drip remains on hold r/t to nose bleed.  Stated to continue to hold heparin drip until INR lab results.

## 2022-07-31 ENCOUNTER — APPOINTMENT (OUTPATIENT)
Dept: GENERAL RADIOLOGY | Facility: CLINIC | Age: 62
DRG: 001 | End: 2022-07-31
Attending: PHYSICIAN ASSISTANT
Payer: COMMERCIAL

## 2022-07-31 LAB
ALBUMIN SERPL BCG-MCNC: 2.9 G/DL (ref 3.5–5.2)
ALP SERPL-CCNC: 221 U/L (ref 40–129)
ALT SERPL W P-5'-P-CCNC: 14 U/L (ref 10–50)
ANION GAP SERPL CALCULATED.3IONS-SCNC: 10 MMOL/L (ref 7–15)
AST SERPL W P-5'-P-CCNC: 32 U/L (ref 10–50)
AT III ACT/NOR PPP CHRO: 98 % (ref 85–135)
BACTERIA BLD CULT: NO GROWTH
BACTERIA BLD CULT: NO GROWTH
BILIRUB SERPL-MCNC: 0.5 MG/DL
BUN SERPL-MCNC: 25 MG/DL (ref 8–23)
CA-I BLD-MCNC: 4.4 MG/DL (ref 4.4–5.2)
CALCIUM SERPL-MCNC: 8.9 MG/DL (ref 8.8–10.2)
CHLORIDE SERPL-SCNC: 93 MMOL/L (ref 98–107)
CREAT SERPL-MCNC: 0.67 MG/DL (ref 0.67–1.17)
D DIMER PPP FEU-MCNC: 6.48 UG/ML FEU (ref 0–0.5)
DEPRECATED HCO3 PLAS-SCNC: 28 MMOL/L (ref 22–29)
ERYTHROCYTE [DISTWIDTH] IN BLOOD BY AUTOMATED COUNT: 17.9 % (ref 10–15)
FIBRINOGEN PPP-MCNC: 478 MG/DL (ref 170–490)
GFR SERPL CREATININE-BSD FRML MDRD: >90 ML/MIN/1.73M2
GLUCOSE BLDC GLUCOMTR-MCNC: 109 MG/DL (ref 70–99)
GLUCOSE BLDC GLUCOMTR-MCNC: 112 MG/DL (ref 70–99)
GLUCOSE BLDC GLUCOMTR-MCNC: 136 MG/DL (ref 70–99)
GLUCOSE BLDC GLUCOMTR-MCNC: 139 MG/DL (ref 70–99)
GLUCOSE BLDC GLUCOMTR-MCNC: 154 MG/DL (ref 70–99)
GLUCOSE BLDC GLUCOMTR-MCNC: 159 MG/DL (ref 70–99)
GLUCOSE BLDC GLUCOMTR-MCNC: 168 MG/DL (ref 70–99)
GLUCOSE BLDC GLUCOMTR-MCNC: 182 MG/DL (ref 70–99)
GLUCOSE SERPL-MCNC: 144 MG/DL (ref 70–99)
HCT VFR BLD AUTO: 29.4 % (ref 40–53)
HGB BLD-MCNC: 9 G/DL (ref 13.3–17.7)
HGB BLD-MCNC: 9.2 G/DL (ref 13.3–17.7)
INR PPP: 1.62 (ref 0.85–1.15)
LACTATE SERPL-SCNC: 1.8 MMOL/L (ref 0.7–2)
MAGNESIUM SERPL-MCNC: 2.1 MG/DL (ref 1.7–2.3)
MCH RBC QN AUTO: 29.1 PG (ref 26.5–33)
MCHC RBC AUTO-ENTMCNC: 31.3 G/DL (ref 31.5–36.5)
MCV RBC AUTO: 93 FL (ref 78–100)
PHOSPHATE SERPL-MCNC: 3.8 MG/DL (ref 2.5–4.5)
PLATELET # BLD AUTO: 335 10E3/UL (ref 150–450)
POTASSIUM SERPL-SCNC: 4.1 MMOL/L (ref 3.4–5.3)
PROT SERPL-MCNC: 6.5 G/DL (ref 6.4–8.3)
RBC # BLD AUTO: 3.16 10E6/UL (ref 4.4–5.9)
SODIUM SERPL-SCNC: 131 MMOL/L (ref 136–145)
WBC # BLD AUTO: 10 10E3/UL (ref 4–11)

## 2022-07-31 PROCEDURE — 250N000013 HC RX MED GY IP 250 OP 250 PS 637: Performed by: SURGERY

## 2022-07-31 PROCEDURE — 250N000013 HC RX MED GY IP 250 OP 250 PS 637: Performed by: ANESTHESIOLOGY

## 2022-07-31 PROCEDURE — 250N000011 HC RX IP 250 OP 636: Performed by: STUDENT IN AN ORGANIZED HEALTH CARE EDUCATION/TRAINING PROGRAM

## 2022-07-31 PROCEDURE — 36592 COLLECT BLOOD FROM PICC: CPT | Performed by: PHYSICIAN ASSISTANT

## 2022-07-31 PROCEDURE — 250N000013 HC RX MED GY IP 250 OP 250 PS 637: Performed by: STUDENT IN AN ORGANIZED HEALTH CARE EDUCATION/TRAINING PROGRAM

## 2022-07-31 PROCEDURE — 250N000011 HC RX IP 250 OP 636: Performed by: SURGERY

## 2022-07-31 PROCEDURE — 250N000013 HC RX MED GY IP 250 OP 250 PS 637: Performed by: THORACIC SURGERY (CARDIOTHORACIC VASCULAR SURGERY)

## 2022-07-31 PROCEDURE — C9113 INJ PANTOPRAZOLE SODIUM, VIA: HCPCS | Performed by: PHYSICIAN ASSISTANT

## 2022-07-31 PROCEDURE — 84100 ASSAY OF PHOSPHORUS: CPT | Performed by: SURGERY

## 2022-07-31 PROCEDURE — 250N000011 HC RX IP 250 OP 636: Performed by: PHYSICIAN ASSISTANT

## 2022-07-31 PROCEDURE — 85300 ANTITHROMBIN III ACTIVITY: CPT | Performed by: SURGERY

## 2022-07-31 PROCEDURE — 120N000003 HC R&B IMCU UMMC

## 2022-07-31 PROCEDURE — 82330 ASSAY OF CALCIUM: CPT | Performed by: SURGERY

## 2022-07-31 PROCEDURE — 250N000013 HC RX MED GY IP 250 OP 250 PS 637: Performed by: INTERNAL MEDICINE

## 2022-07-31 PROCEDURE — 80053 COMPREHEN METABOLIC PANEL: CPT | Performed by: SURGERY

## 2022-07-31 PROCEDURE — 85379 FIBRIN DEGRADATION QUANT: CPT | Performed by: SURGERY

## 2022-07-31 PROCEDURE — 85384 FIBRINOGEN ACTIVITY: CPT | Performed by: SURGERY

## 2022-07-31 PROCEDURE — 99254 IP/OBS CNSLTJ NEW/EST MOD 60: CPT | Mod: GC | Performed by: INTERNAL MEDICINE

## 2022-07-31 PROCEDURE — 85027 COMPLETE CBC AUTOMATED: CPT | Performed by: SURGERY

## 2022-07-31 PROCEDURE — 83605 ASSAY OF LACTIC ACID: CPT | Performed by: SURGERY

## 2022-07-31 PROCEDURE — 36592 COLLECT BLOOD FROM PICC: CPT | Performed by: SURGERY

## 2022-07-31 PROCEDURE — 83735 ASSAY OF MAGNESIUM: CPT | Performed by: SURGERY

## 2022-07-31 PROCEDURE — 85018 HEMOGLOBIN: CPT | Performed by: PHYSICIAN ASSISTANT

## 2022-07-31 PROCEDURE — 87040 BLOOD CULTURE FOR BACTERIA: CPT | Performed by: SURGERY

## 2022-07-31 PROCEDURE — 85610 PROTHROMBIN TIME: CPT | Performed by: SURGERY

## 2022-07-31 PROCEDURE — 71045 X-RAY EXAM CHEST 1 VIEW: CPT | Mod: 26 | Performed by: STUDENT IN AN ORGANIZED HEALTH CARE EDUCATION/TRAINING PROGRAM

## 2022-07-31 PROCEDURE — 99233 SBSQ HOSP IP/OBS HIGH 50: CPT | Mod: GC | Performed by: INTERNAL MEDICINE

## 2022-07-31 PROCEDURE — 71045 X-RAY EXAM CHEST 1 VIEW: CPT

## 2022-07-31 RX ORDER — BUMETANIDE 0.25 MG/ML
3 INJECTION INTRAMUSCULAR; INTRAVENOUS
Status: DISCONTINUED | OUTPATIENT
Start: 2022-07-31 | End: 2022-08-02

## 2022-07-31 RX ORDER — BUMETANIDE 0.25 MG/ML
4 INJECTION INTRAMUSCULAR; INTRAVENOUS
Status: DISCONTINUED | OUTPATIENT
Start: 2022-07-31 | End: 2022-07-31

## 2022-07-31 RX ORDER — WARFARIN SODIUM 6 MG/1
12 TABLET ORAL
Status: COMPLETED | OUTPATIENT
Start: 2022-07-31 | End: 2022-07-31

## 2022-07-31 RX ORDER — LORAZEPAM 2 MG/ML
.5-1 INJECTION INTRAMUSCULAR EVERY 6 HOURS PRN
Status: DISCONTINUED | OUTPATIENT
Start: 2022-07-31 | End: 2022-08-02

## 2022-07-31 RX ADMIN — ACETAMINOPHEN 975 MG: 325 TABLET, FILM COATED ORAL at 23:56

## 2022-07-31 RX ADMIN — BUMETANIDE 3 MG: 0.25 INJECTION, SOLUTION INTRAMUSCULAR; INTRAVENOUS at 07:57

## 2022-07-31 RX ADMIN — HYDROXYZINE HYDROCHLORIDE 50 MG: 25 TABLET ORAL at 23:56

## 2022-07-31 RX ADMIN — CAPTOPRIL 12.5 MG: 12.5 TABLET ORAL at 07:57

## 2022-07-31 RX ADMIN — BUMETANIDE 3 MG: 0.25 INJECTION, SOLUTION INTRAMUSCULAR; INTRAVENOUS at 13:13

## 2022-07-31 RX ADMIN — ALTEPLASE 2 MG: 2.2 INJECTION, POWDER, LYOPHILIZED, FOR SOLUTION INTRAVENOUS at 18:07

## 2022-07-31 RX ADMIN — CAPTOPRIL 12.5 MG: 12.5 TABLET ORAL at 14:32

## 2022-07-31 RX ADMIN — OXYCODONE HYDROCHLORIDE 5 MG: 5 TABLET ORAL at 20:29

## 2022-07-31 RX ADMIN — OLANZAPINE 5 MG: 5 TABLET, FILM COATED ORAL at 20:29

## 2022-07-31 RX ADMIN — ONDANSETRON 4 MG: 2 INJECTION INTRAMUSCULAR; INTRAVENOUS at 20:42

## 2022-07-31 RX ADMIN — INSULIN ASPART 1 UNITS: 100 INJECTION, SOLUTION INTRAVENOUS; SUBCUTANEOUS at 08:23

## 2022-07-31 RX ADMIN — ACETAMINOPHEN 975 MG: 325 TABLET, FILM COATED ORAL at 07:57

## 2022-07-31 RX ADMIN — BUMETANIDE 3 MG: 0.25 INJECTION, SOLUTION INTRAMUSCULAR; INTRAVENOUS at 18:57

## 2022-07-31 RX ADMIN — FLUTICASONE PROPIONATE AND SALMETEROL XINAFOATE 2 PUFF: 115; 21 AEROSOL, METERED RESPIRATORY (INHALATION) at 08:34

## 2022-07-31 RX ADMIN — INSULIN ASPART 2 UNITS: 100 INJECTION, SOLUTION INTRAVENOUS; SUBCUTANEOUS at 20:49

## 2022-07-31 RX ADMIN — PANTOPRAZOLE SODIUM 40 MG: 40 INJECTION, POWDER, FOR SOLUTION INTRAVENOUS at 08:03

## 2022-07-31 RX ADMIN — PANTOPRAZOLE SODIUM 40 MG: 40 INJECTION, POWDER, FOR SOLUTION INTRAVENOUS at 20:29

## 2022-07-31 RX ADMIN — Medication 1 PACKET: at 07:57

## 2022-07-31 RX ADMIN — HYDRALAZINE HYDROCHLORIDE 20 MG: 20 INJECTION INTRAMUSCULAR; INTRAVENOUS at 04:42

## 2022-07-31 RX ADMIN — MULTIVITAMIN 15 ML: LIQUID ORAL at 07:56

## 2022-07-31 RX ADMIN — HYDROXYZINE HYDROCHLORIDE 50 MG: 25 TABLET ORAL at 01:12

## 2022-07-31 RX ADMIN — Medication 1 PACKET: at 20:30

## 2022-07-31 RX ADMIN — Medication 1 PACKET: at 13:48

## 2022-07-31 RX ADMIN — ACETAMINOPHEN 975 MG: 325 TABLET, FILM COATED ORAL at 16:33

## 2022-07-31 RX ADMIN — DIGOXIN 125 MCG: 125 TABLET ORAL at 07:57

## 2022-07-31 RX ADMIN — WARFARIN SODIUM 12 MG: 6 TABLET ORAL at 18:57

## 2022-07-31 RX ADMIN — HYDROXYCHLOROQUINE SULFATE 200 MG: 200 TABLET, FILM COATED ORAL at 20:29

## 2022-07-31 RX ADMIN — ATORVASTATIN CALCIUM 40 MG: 40 TABLET, FILM COATED ORAL at 20:29

## 2022-07-31 RX ADMIN — OXYCODONE HYDROCHLORIDE 5 MG: 5 TABLET ORAL at 12:26

## 2022-07-31 RX ADMIN — HYDROXYCHLOROQUINE SULFATE 200 MG: 200 TABLET, FILM COATED ORAL at 08:06

## 2022-07-31 RX ADMIN — INSULIN ASPART 2 UNITS: 100 INJECTION, SOLUTION INTRAVENOUS; SUBCUTANEOUS at 04:34

## 2022-07-31 RX ADMIN — OXYCODONE HYDROCHLORIDE 5 MG: 5 TABLET ORAL at 04:31

## 2022-07-31 RX ADMIN — ALTEPLASE 2 MG: 2.2 INJECTION, POWDER, LYOPHILIZED, FOR SOLUTION INTRAVENOUS at 18:06

## 2022-07-31 RX ADMIN — ACETAMINOPHEN 975 MG: 325 TABLET, FILM COATED ORAL at 00:05

## 2022-07-31 RX ADMIN — FLUTICASONE PROPIONATE AND SALMETEROL XINAFOATE 2 PUFF: 115; 21 AEROSOL, METERED RESPIRATORY (INHALATION) at 20:29

## 2022-07-31 RX ADMIN — Medication 10 MG: at 22:43

## 2022-07-31 RX ADMIN — CAPTOPRIL 12.5 MG: 12.5 TABLET ORAL at 20:29

## 2022-07-31 RX ADMIN — OXYCODONE HYDROCHLORIDE 5 MG: 5 TABLET ORAL at 00:06

## 2022-07-31 RX ADMIN — HYDROMORPHONE HYDROCHLORIDE 0.4 MG: 0.2 INJECTION, SOLUTION INTRAMUSCULAR; INTRAVENOUS; SUBCUTANEOUS at 02:35

## 2022-07-31 RX ADMIN — ONDANSETRON 4 MG: 2 INJECTION INTRAMUSCULAR; INTRAVENOUS at 14:40

## 2022-07-31 RX ADMIN — DULOXETINE 30 MG: 30 CAPSULE, DELAYED RELEASE ORAL at 07:57

## 2022-07-31 RX ADMIN — OXYCODONE HYDROCHLORIDE 5 MG: 5 TABLET ORAL at 16:40

## 2022-07-31 RX ADMIN — TRAZODONE HYDROCHLORIDE 50 MG: 50 TABLET ORAL at 22:43

## 2022-07-31 RX ADMIN — ASPIRIN 81 MG CHEWABLE TABLET 81 MG: 81 TABLET CHEWABLE at 07:57

## 2022-07-31 RX ADMIN — HYDROXYZINE HYDROCHLORIDE 50 MG: 25 TABLET ORAL at 14:35

## 2022-07-31 RX ADMIN — HYDRALAZINE HYDROCHLORIDE 20 MG: 20 INJECTION INTRAMUSCULAR; INTRAVENOUS at 13:18

## 2022-07-31 RX ADMIN — OLANZAPINE 5 MG: 5 TABLET, FILM COATED ORAL at 07:57

## 2022-07-31 ASSESSMENT — ACTIVITIES OF DAILY LIVING (ADL)
ADLS_ACUITY_SCORE: 38
ADLS_ACUITY_SCORE: 40
ADLS_ACUITY_SCORE: 38
ADLS_ACUITY_SCORE: 38
ADLS_ACUITY_SCORE: 34
ADLS_ACUITY_SCORE: 38
ADLS_ACUITY_SCORE: 38
ADLS_ACUITY_SCORE: 40
ADLS_ACUITY_SCORE: 38

## 2022-07-31 NOTE — PROGRESS NOTES
Calorie Count  Intake recorded for: 7/30  Total Kcals: 141 Total Protein: 3g  Kcals from Hospital Food: 141   Protein: 3g  Kcals from Outside Food (average):0  Protein: 0g  # Meals Ordered from Kitchen: 2  # Meals Recorded: 1 meal - 100% of 1 chocolate ice cream  # Supplements Recorded: No intake recorded

## 2022-07-31 NOTE — PROVIDER NOTIFICATION
"Provider notified: Surg Cross Cover - Moses Mckeon MD    0519- \"T Shavon [id number]- pt feeling SOB and quite anxious. PRN atarax given earlier tonight with little relief. are we able to add or try a different intervention? Thanks Shania [phone]\"    No new orders at this time. MD acknowledged and deferring to day team.  "

## 2022-07-31 NOTE — PLAN OF CARE
Neuro: A&Ox3-4; intermittently anxious and agitated during day.  PRN Atarax given 1x.  Noted delirium at night. Fall on 7/29 at night-sitter remains at bedside. Follows commands.  Cardiac: ST. VSS. Declinec chest pain. LVAD hum noted.    Respiratory: Sating above 90% on RA.  Lung sounds coarse to clear. Continue with 3 mg IV bumex.  GI/: Good urine output via marcelo. Several loose/watery stools noted t/o shift.  Dark/coffee-ground colored stools noted this evening-cross cover team made aware.  Continue to monitor.  Intermittent nausea-increases w/movement.  Diet/appetite: Tolerating regular diet. Eating poorly. Cycle TF started at 1800 @ 75 ml/hr with 30 ml water flush Q 4 hours. Continue to encourage oral intake.  Activity:  Assist of 1-2x up to chair during shift.  PT/OT following.  Pain: At acceptable level on current regimen. Scheduled Tylenol and Oxycodone given.  Pt complained of pain to shoulder and back. PRN oxycodone given 1x.  Skin: No new deficits noted.  Redden coccyx.  Dressings C/D/I  LDA's: 3x Right PICC lumen, LVAD, NJ at 110 cm, marcelo  Hgb drop 6.8- 2 units of PRBCs ordered.  Recheck Hgb 30 min post 2nd transfusion.     Plan: Continue with POC. Notify primary team with changes.

## 2022-07-31 NOTE — PROGRESS NOTES
Cardiology Progress Note    Assessment & Plan   Dandy Sands is a 61 year old male with PMH of lupus, antiphospholipid syndrome, HTN, HLD, HFrEF 2/2 ICM who presented with cardiogenic shock c/b multiorgan failure (JOSE, shock liver) requiring mechanical support with IABP, now s/p HM3 LVAD 7/8/22 by Dr. Zamora with intraoperative course c/b RV failure s/p 29F Protek duo via RIJ. Post op course c/b hemopericardium s/p repeat washout with chest left open. Chest closed 7/13/22 and decannulated from RVAD 7/21    Changes Today:  - Increase diuretics to Bumex 3mg TID given dyspnea  - Consider bedside thoracentesis tomorrow if dyspnea does not improve with higher dose of diuretics     #HFrEF 2/2 ICM s/p HM3 LVAD in setting of cardiogenic shock (SCAI D)  #RV failure s/p Protek duo temporary RVAD s/p decannulation 7/21  #RV thrombus  #Post chest closure hemopericardium s/p repeat washout (7/12)  Patient with ICM s/p HM3 LVAD 7/8/22 by Dr. Zamora in setting of presentation for cardiogenic shock requiring MCS with IABP as prior to HM (initially evaluated for heart transplant, however noted to have substantially elevated DSAs during course of care, so decision made to proceed with LVAD given patient already on temporary MCS). Intraoperative course c/b RV failure resulting in cardiogenic shock requiring high dose pressors necessitating RVAD support. Initial post-op course c/b hemopericardium requiring repeat washout with chest left open, however has since recovered well with decannulation on 7/21 with extubation day prior. Has continued to tolerate well from hemodynamic and end organ standpoint. Continuing to work on pulmonary toilet and diuresis to improve his oxygenation and total body fluid status.   -LVAD: continues to have good flows, no alarms and stable end organ perfusion; continue speed at 5300  -continue digoxin for RV support  -AC, antiplatelets: continue ASA; heparin high intensity on hold given epistaxis/bleeding  "concerns, defer to surgery when it would be appropriate to restart; warfarin started and awaiting therapeutic level  -Volume status: Hypervolemic with pulmonary edema; increase bumex 3mg IV BID with goal negative ~ 1-2L  -rhythm: sinus              -of note, patient with climbing capture threshold of RV lead; will need to be evaluated in future by EP team  -blood pressure: Continue captopril to 12.5mg TID, may increase tomorrow if MAPs remain elevated  -encourage ICS, pulmonary toilet  -delirium precautions     The patient's care was discussed with the Attending Physician, Dr. Faustin, Bedside Nurse and Primary team    Antonio Kline MD  Cardiology Fellow  [phone]    Interval History   Increased dyspnea reported this AM.  Also with significant anxiety.     Physical Exam   Temp: 97.4  F (36.3  C) Temp src: Axillary BP: (!) 106/94 Pulse: 105   Resp: 18 SpO2: 98 % O2 Device: Oxymask Oxygen Delivery: 2 LPM  Vitals:    07/28/22 0500 07/29/22 0319 07/30/22 0600   Weight: 72.6 kg (160 lb 0.9 oz) 69.8 kg (153 lb 12.8 oz) 68.6 kg (151 lb 3.8 oz)     Vital Signs with Ranges  Temp:  [96.9  F (36.1  C)-97.6  F (36.4  C)] 97.4  F (36.3  C)  Pulse:  [105-116] 105  Resp:  [18-32] 18  BP: ()/(35-99) 106/94  SpO2:  [92 %-100 %] 98 %  I/O last 3 completed shifts:  In: 1738 [I.V.:23; NG/GT:310]  Out: 2425 [Urine:2425]     , Blood pressure (!) 106/94, pulse 105, temperature 97.4  F (36.3  C), temperature source Axillary, resp. rate 18, height 1.702 m (5' 7\"), weight 68.6 kg (151 lb 3.8 oz), SpO2 98 %.  151 lbs 3.77 oz  General Appearance:  Older male in NAD   Respiratory: coarse lung sounds, increase work of breathing  Cardiovascular: vad hum, driveline site c/d/i, chest incisions c/d/i, JVD ~10-12 cm H20  GI: soft, nt, bs active  Skin: warm, well perfused, trace diffuse edema  Neuro: awake, alert, interactive, speech fluent, moving all 4 extremities    Medications     dextrose         acetaminophen  975 mg Oral or Feeding " Tube Q8H [facility]     aspirin  81 mg Oral or Feeding Tube Daily     atorvastatin  40 mg Oral QPM     bumetanide  3 mg Intravenous TID     captopril  12.5 mg Oral TID     digoxin  125 mcg Oral Daily     DULoxetine  30 mg Oral Daily     fiber modular (NUTRISOURCE FIBER)  1 packet Per Feeding Tube TID     fluticasone-salmeterol  2 puff Inhalation BID     [START ON 8/3/2022] fluticasone-vilanterol  1 puff Inhalation Daily     hydroxychloroquine  200 mg Oral BID     insulin aspart  1-12 Units Subcutaneous Q4H     [Held by provider] insulin glargine  20 Units Subcutaneous QAM AC     lidocaine  1 patch Transdermal Q24h    And     lidocaine   Transdermal Q8H CAMMY     lidocaine 3%, phenylephrine 0.25% solution for irrigation  5 mL Irrigation Once     melatonin  10 mg Oral QPM     multivitamins w/minerals  15 mL Per Feeding Tube Daily     OLANZapine  5 mg Oral BID     oxidized cellulose   Topical Once     oxyCODONE  5 mg Oral or Feeding Tube Q8H     pantoprazole (PROTONIX) IV  40 mg Intravenous BID     protein modular  1 packet Per Feeding Tube TID     silver nitrate   Topical Once     traZODone  25 mg Oral At Bedtime    Or     traZODone  50 mg Oral At Bedtime     Warfarin Therapy Reminder  1 each Oral See Admin Instructions       Data   Recent Labs   Lab 07/31/22  1113 07/31/22  0809 07/31/22  0802 07/31/22  0712 07/31/22  0008 07/30/22  2330 07/30/22  1149 07/30/22  1009 07/29/22  0723 07/29/22  0525   WBC  --   --   --  10.0  --   --   --  11.7*  --  11.3*   HGB  --   --   --  9.2*  --  9.0*  --  6.8*  --  8.4*   MCV  --   --   --  93  --   --   --  95  --  95   PLT  --   --   --  335  --   --   --  439  --  366   INR  --   --   --  1.62*  --   --   --  1.58*  --  1.37*   NA  --   --   --  131*  --   --   --  133*  --  135*   POTASSIUM  --   --   --  4.1  --   --   --  4.3  --  4.3   CHLORIDE  --   --   --  93*  --   --   --  94*  --  92*   CO2  --   --   --  28  --   --   --  29  --  33*   BUN  --   --   --  25.0*  --   --   --   20.7  --  16.9   CR  --   --   --  0.67  --   --   --  0.68  --  0.58*   ANIONGAP  --   --   --  10  --   --   --  10  --  10   ELDA  --   --   --  8.9  --   --   --  8.6*  --  8.8   * 154* 159* 144*   < >  --    < > 142*   < > 115*   ALBUMIN  --   --   --  2.9*  --   --   --  2.8*  --  2.9*   PROTTOTAL  --   --   --  6.5  --   --   --  6.4  --  6.6   BILITOTAL  --   --   --  0.5  --   --   --  0.5  --  0.5   ALKPHOS  --   --   --  221*  --   --   --  195*  --  230*   ALT  --   --   --  14  --   --   --  11  --  13   AST  --   --   --  32  --   --   --  34  --  34    < > = values in this interval not displayed.       Recent Results (from the past 24 hour(s))   XR Chest Port 1 View    Narrative    EXAM: XR Chest 1 view 7/31/2022 8:24 AM      HISTORY: SOB.    COMPARISON: Previous day.     TECHNIQUE: Frontal view of the chest.    FINDINGS: Left chest wall ICD with leads in stable position. LVAD.  Enteric tube is subdiaphragmatic with tip coming up from the study.  Right-sided CVC with tip in the high SVC. Trachea is midline.  Cardiomediastinal silhouette is stable. Stable interstitial pulmonary  opacities and bibasilar pulmonary opacities. Persistent right moderate  pleural effusion. Possible small left pleural effusion. No  pneumothoraces.      Impression    IMPRESSION:     1. Stable interstitial and bibasilar pulmonary opacities, likely  pulmonary edema.  2. Persistent right moderate pleural effusion.  3. Relatively unchanged support devices.    I have personally reviewed the examination and initial interpretation  and I agree with the findings.    JILL CARRANZA MD         SYSTEM ID:  [id number]

## 2022-07-31 NOTE — PROGRESS NOTES
"Provider Paged: Surg Cross Cover:     2200 -\"MARITZA Sands [id number]: pt has one time dose of coumadin ordered - just finished 2 units PRBCs, pt having bloody stools this evening. do you want me to give coumadin? Thanks Shania [phone]\"    7232- MD spoke with RN on the phone. Verbal order to give the coumadin. Another verbal order to hold evening dose of 12.5 mg dose of captopril.   "

## 2022-07-31 NOTE — CONSULTS
GASTROENTEROLOGY CONSULTATION      Date of Admission:  6/17/2022           Reason for Consultation:   We were asked by Ney FABIAN to evaluate this patient with suspected upper GI bleeding            ASSESSMENT AND RECOMMENDATIONS:   Assessment:  Suspected upper GI bleeding with melena  Decompensated heart failure s/p LVAD   CAD s/p PCIs  Severe Mitral regurgitation  HTN, HLD    61 year old male with a history of  hypertension, hyperlipidemia, CAD status post PCI, low EF status post CRRT, severe mitral regurgitation who initially was sent to [facility] in 5/2022 from [place] as a transfer for management of cardiogenic shock. He was optimized at [facility] , discharged for a brief period of time and was readmitted again. He ultimately underwent LVAD placement and had an eventful post procedural course.    GI is now consulted for suspected upper GI bleeding and melena.  Patient endorses to swallowing blood during recent epistaxis episode, this episode was significant enough resulting in loss of copious amounts of blood and causing anemia.  It is likely that melena is due to swallowed blood products.  Other less likely, other possible etiologies include trauma from feeding tube, suction trauma, conventional etiologies like gastritis versus peptic ulcer disease versus esophagitis.    He is recuperating from an eventful postoperative course, is deconditioned with poor general health condition which puts him at risk for procedural complications. Unless there is hemodynamically significant bleeding, the risks of upper endoscopy outweigh the benefits.     Recommendations  Continue IV PPI twice daily for now  Monitor Hb/Hct and transfuse as needed  No indication for endoscopy at this point of time but will consider if there is evidence of hemodynamically significant bleeding, pt will likely need intubation in that setting.     Thank you for involving us in this patient's care. Please do not hesitate to contact the GI service with  any questions or concerns.     Pt care plan discussed with Dr. Orozco, GI staff physician.    Paolo Payne MD  -------------------------------------------------------------------------------------------------------------------           History of Present Illness:   Dandy Sands is a 61 year old male with a history of hypertension, hyperlipidemia, CAD status post PCI, low EF status post CRRT, severe mitral regurgitation who initially was sent to [facility] in 5/2022 from [place] as a transfer for management of cardiogenic shock. He was optimized at [facility] , discharged for a brief period of time and was readmitted again.     He subsequently underwent LVAD placement, intra op course was complicated by development of RV failure/cardiogenic shock necessitating RVAD support. Post op course c/b hemopericardium s/p repeat washout with chest left open. Chest closed 7/13/22 and decannulated from RVAD 7/21. GI is now being consulted for possible melena.     While pt is recovering from extensive surgeries, yesterday at around 3:30 am he had significant epistaxis due to maceration of nasal septal mucosa. This needed ENT involvement and nasal packing. Pt is on anticoagulation and reports that due to epistaxis he had significant amount of bleeding and remembers swallowing a lot of blood during the episode.     He reports intermittent nausea and vomiting but denies any abd pain. He endorses shortness of breath and generalized weakness, deconditioning.          Data:   Key relevant labs:      Latest Reference Range & Units 07/30/22 10:09 07/30/22 23:30 07/31/22 07:12   Hemoglobin 13.3 - 17.7 g/dL 6.8 (LL) 9.0 (L) 9.2 (L)   Hematocrit 40.0 - 53.0 % 22.5 (L)  29.4 (L)       Key relevant imaging:           Previous Endoscopy:   Colonoscopy 5/16/2022    Impression:            Colonoscopy performed for screening in a potential                          heart transplant candidate. He reports no FH of colon                          cancer.                           No polyps were seen.          Medications:     Medications Prior to Admission   Medication Sig Dispense Refill Last Dose     atorvastatin (LIPITOR) 40 MG tablet Take 40 mg by mouth daily   Past Week at Unknown time     clopidogrel (PLAVIX) 75 MG tablet Take 1 tablet (75 mg) by mouth daily 30 tablet 3 6/17/2022 at Unknown time     DULoxetine (CYMBALTA) 30 MG capsule Take 1 capsule (30 mg) by mouth daily 30 capsule 0 6/17/2022 at Unknown time     famotidine (PEPCID) 20 MG tablet Take 20 mg by mouth 2 times daily   6/17/2022 at Unknown time     ferrous sulfate (FE TABS) 325 (65 Fe) MG EC tablet Take 325 mg by mouth daily (with lunch)   Past Week at Unknown time     furosemide (LASIX) 40 MG tablet Take 1 tablet (40 mg) by mouth daily 60 tablet 3 Past Week at Unknown time     hydroxychloroquine (PLAQUENIL) 200 MG tablet Take 200 mg by mouth 2 times daily   6/17/2022 at 0800     hydrOXYzine (ATARAX) 25 MG tablet Take 1 tablet (25 mg) by mouth every 6 hours as needed for anxiety (ANXIETY) 60 tablet 3 6/17/2022 at 1245     LORazepam (ATIVAN) 0.5 MG tablet Take 0.5-1 mg by mouth every 4 hours as needed for anxiety   Past Week at Unknown time     melatonin 3 MG tablet Take 6 mg by mouth nightly as needed for sleep   6/16/2022 at 2100     mycophenolate (GENERIC EQUIVALENT) 500 MG tablet Take 1,500 mg by mouth 2 times daily   6/17/2022 at 0800     potassium chloride ER (KLOR-CON M) 20 MEQ CR tablet Take 1 tablet (20 mEq) by mouth 2 times daily 60 tablet 1 Past Week at Unknown time     promethazine (PHENERGAN) 12.5 MG tablet Take 1 tablet (12.5 mg) by mouth every 6 hours as needed for nausea or vomiting 30 tablet 1 6/16/2022 at 1200     tamsulosin (FLOMAX) 0.4 MG capsule Take 0.4 mg by mouth daily   6/17/2022 at Unknown time     thiamine (B-1) 100 MG tablet Take 1 tablet (100 mg) by mouth daily 30 tablet 0 6/17/2022 at Unknown time     warfarin ANTICOAGULANT (COUMADIN) 5 MG tablet Take 1 tablet (5 mg) by  "mouth daily Get an INR on Friday 5/27 and then follow instructions by your coumadin clinic 30 tablet 3 6/16/2022 at 1800     zolpidem (AMBIEN) 5 MG tablet Take 5 mg by mouth nightly as needed for sleep   Unknown at Unknown time             Physical Exam:   BP (!) 40/30 (BP Location: Left arm)   Pulse 110   Temp 97.5  F (36.4  C) (Axillary)   Resp 20   Ht 1.702 m (5' 7\")   Wt 68.6 kg (151 lb 3.8 oz)   SpO2 100%   BMI 23.69 kg/m    Wt:   Wt Readings from Last 2 Encounters:   07/30/22 68.6 kg (151 lb 3.8 oz)   07/08/22 62.6 kg (138 lb)      Constitutional: cooperative, pleasant, frail looking   Eyes: Sclera anicteric  Ears/nose/mouth/throat: mucus membranes moist, hearing intact  Neck: supple  CV: No edema, LVAD +ve  Respiratory: Unlabored breathing  Abd: Soft, non tender  Skin:no jaundice  Neuro: AAO x 3, No asterixis  Psych: Normal affect  MSK: No gross deformities          Past Medical History:   Reviewed and edited as appropriate  No past medical history on file.         Past Surgical History:   Reviewed and edited as appropriate   Past Surgical History:   Procedure Laterality Date     COLONOSCOPY N/A 05/23/2022    Procedure: COLONOSCOPY;  Surgeon: Parth Meneses MD;  [facility]: [facility]     CV CORONARY ANGIOGRAM N/A 5/24/2022    Procedure: Coronary Angiogram;  Surgeon: Miek Pope MD;  [facility]     CV CORONARY ANGIOGRAM N/A 5/29/2022    Procedure: Coronary Angiogram;  Surgeon: Austin Bright MD;  Location:  [facility]     CV CORONARY LITHOTRIPSY PCI Bilateral 5/24/2022    Procedure: Percutaneous Coronary Intervention - Lithotripsy;  Surgeon: Mike Pope MD;  Location:  [facility]     CV INTRA AORTIC BALLOON N/A 6/17/2022    Procedure: Intraprocedure Aortic Balloon Pump Insertion;  Surgeon: Sherman Cerda MD;  Location:  [facility]ARD[facility] C[facility]     CV INTRA AORTIC BALLOON Right 7/3/2022    Procedure: Reposition of " Subclavian Intraprocedure Aortic Balloon Pump;  Surgeon: Austin Bright MD;  Location:  [facility]     CV INTRAVASULAR ULTRASOUND N/A 5/24/2022    Procedure: Intravascular Ultrasound;  Surgeon: Mike Pope MD;  Location:  [facility]ARD[facility] C[facility] LAB     CV PCI ANGIOPLASTY N/A 5/29/2022    Procedure: Percutaneous Transluminal Angioplasty;  Surgeon: Austin Bright MD;  Location:  [facility]     CV PCI STENT DRUG ELUTING N/A 5/24/2022    Procedure: Percutaneous Coronary Intervention Stent;  Surgeon: Mike Pope MD;  Location:  [facility]     CV RIGHT HEART CATH MEASUREMENTS RECORDED N/A 05/18/2022    Procedure: Right Heart Catheterization;  Surgeon: Justo Stewart MD;  Location:  HE[facility] CARDIAC CATH LAB     CV RIGHT HEART CATH MEASUREMENTS RECORDED N/A 5/24/2022    Procedure: Right Heart Catheterization;  Surgeon: Mike Pope MD;  Location:  [facility] CARDI[facility] CATH LAB     CV RIGHT HEART CATH MEASUREMENTS RECORDED N/A 5/29/2022    Procedure: Right Heart Catheterization;  Surgeon: Austin Bright MD;  Location:  HE[facility] CARDI[facility] CATH LAB     CV RIGHT HEART CATH MEASUREMENTS RECORDED N/A 7/1/2022    Procedure: Right Heart Catheterization;  Surgeon: Mike Pope MD;  Location:  [facility] CARD[facility] CATH LAB     CV RIGHT HEART EXERCISE STRESS STUDY N/A 05/18/2022    Procedure: Stress Drug Study;  Surgeon: Justo Stewart MD;  Location:  HE[facility] CARDIAC CATH LAB     CV SWAN CHOCO PROCEDURE N/A 6/17/2022    Procedure: [place] Procedure;  Surgeon: Sherman Cerda MD;  Location:  HEART CARDIAC CATH LAB     INCISION AND DRAINAGE CHEST WASHOUT, COMBINED N/A 7/11/2022    Procedure: Chest washout and closure;  Surgeon: Darnell Morgan MD;  [facility]: [facility]     INCISION AND DRAINAGE CHEST WASHOUT, COMBINED N/A 7/12/2022    Procedure: INCISION AND DRAINAGE, WOUND, CHEST, WITH IRRIGATION;  Surgeon: Darius Zamora MD;  Location: [facility]     INSERT ARTERIAL  LINE  7/18/2022          INSERT INTRAAORTIC BALLOON PUMP Right 6/23/2022    Procedure: INSERTION, INTRA-AORTIC BALLOON PUMP RIGHT SUBCLAVIAN ARTERY.;  Surgeon: Hayden Veras MD;  Location: [facility]     INSERT VENTRICULAR ASSIST DEVICE LEFT (HEARTMATE II/III) MINIMALLY INVASIVE N/A 7/8/2022    Procedure: MEDIAN STERNOTOMY.  CARDIOPULMONARY BYPASS.  INTRAOPERATIVE TRANSESOPHAGEAL ECHOCARDIOGRAM.  IMPLANT LEFT VENTRICULAR ASSIST DEVICE HEARTMATE III.  PLACEMENT OF PERCUTANEOUS RIGHT VENTRICULAR ASSIST DEVICE.  TEMPORARY CHEST CLOSURE;  Surgeon: Darius Zamora MD;  Location: [facility]     IRRIGATION AND DEBRIDEMENT CHEST WASHOUT, COMBINED N/A 7/13/2022    Procedure: IRRIGATION AND DEBRIDEMENT, CHEST;  Surgeon: Darius Zamora MD;  Location: [facility]     PICC DOUBLE LUMEN PLACEMENT Right 05/28/2022    right basilic 5 fr dl picc 40 cm            Social History:   Alcohol use: No excessive alcohol use   Smoking history: Former smoking         Family History:   Reviewed and edited as appropriate  No family history on file.   Father and sister with lung cancer          Allergies:   Reviewed and edited as appropriate   No Known Allergies           Review of Systems:     A complete 10 point review of systems was performed and is negative except as noted in the HPI

## 2022-07-31 NOTE — PROGRESS NOTES
CVTS PROGRESS NOTE  07/31/2022      ASSESSMENT Dandy Sands is a 61 year old male with a PMH of CAD s/p PCI to LAD, MI, ICM, acute on chronic heart failure, s/p CRT-D, severe MR, antiphospholipid antibody syndrome on chronic warfarin, SLE, HTN, HLD, recent hospitalization 5/16-5/26 s/p RCA, LAD stenting who underwent RVAD (29F Protek duo RIJ) LVAD placement (HeartMate 3) on 7/8/22 by Dr. Zamora. Intraoperative course complicated by IABP dysfunction and RV failure, requiring crash onto bypass / vasopressor support, NO, and protek placement. Now s/p chest closure and NO wean 7/11. Return to OR for hemopericardium (7/12) and chest re-closure 7/13. Protek RVAD removed 7/21.    Today's progress  hgb stable after 2 units 7/30  Diuresis per cardiology   Hold heparin due to melena in stools, INR 1.6  IR consulted for right moderate pleural effusion.       PLAN:   Neuro/ pain/ sedation:  #Acute Postoperative pain  #Depression   #Anxiety due to a medical condition  #Insomnia  #Delerium   Delirious intermittently.   - Monitor neurological status. Notify the MD for any acute changes in exam.  - Pain:    -Scheduled tylenol, uriel oxy 5mg q8h, wean off scheduled Oxy 7/29   -PRN oxycodone 5mg q6h  - Depression: Duloxetine 30mg  - Sleep: Melatonin 10 mg HS   - discontinue Seroquel 100 mg HS   - start trazodone    - Zyprexa 5mg BID  - Anxiety: Valproate po 250 mg BID and 500mg Bedtime   - Hydroxyzine PRN  - PTA meds: hydroxyzine 25mg PRN, zolpidem 5mg, melatonin 3mg, lorazepam 0.5mg  - QTc(7/18)- 683 msec, no haldol  Ativan 0.5-1.0mg IV q6h PRN added for anxiety     Pulmonary:   #Acute hypoxic respiratory failure on iMV  #Mild-moderate emphysema  #History of COVID-19  Nasal cannula 4L   - Maintain SpO2> 92%  - Incentive spirometry   SOB-moderate pleural effusion. Consult IR for right pigtail.     Cardiovascular:    #S/p LVAD placement (HeartMate 3) on 7/8/22   #S/p RVAD (29F Protek duo RIJ) on 7/8/22  #S/p chest closure  7/11  #Cardiogenic shock  #Advanced ischemic cardiomyopathy, class IV, stage D  #CAD s/p PCI to LAD (2005)  #S/p CRT-D (2006)  #History of MI (2007)  #Severe MR  #Antiphospholipid antibody syndrome on chronic warfarin  #HTN, HLD  #Recent hospitalization 5/16-5/26 s/p RCA, LAD stenting  #Atrial fibrillation   # RV mobile clot   - Monitor hemodynamic status. Chest closure and oxygenator splicing 7/11. Reopened 7/12 for I&D and left open, closed 7/13. RVAD removed 7/21.  - Goal MAP > 65   - PTA meds(held): clopidogrel 75mg, lasix 40mg, warfarin 5mg  - LVAD management per Advanced HF service  - AC Plan: High intensity Heparin for RV mobile thrombus  - Aspirin 81 mg  - Atorvastatin 40mg   - Hydralazine uriel and prn MAP >85 discontinued  - Captopril 6.25mg TID dose titration per cardiology   - Warfarin 7/24 per pharmacy    GI / Nutrition:   #GERD  #Congestive hepatopathy in the setting of cardiac insuffiencey -resolved  #Hematemesis / oral mucosal bleeding -resolved    - Full liquid diet, SLP following  - Continue NJT at goal   - Nutrition following  - Bowel regimen prn (miralax / senna)  - Trend LFT's every other day  - GI consulted 7/31 for black tarry stools, possible GI bleeding.   - Increase PPI to IV bid    Renal/ Fluid Balance:    - Strict I/O, daily weights   - Avoid/limit nephrotoxins as able  - Replete lytes PRN per protocol  - PTA meds: tamsulosin 0.4mg (held)  - Goal net negative ~ 1L   Bumex gtt for part of 7/24: total UOP 8L, gtt stopped   Bumex 4mg x2 7/25 with over 6L UOP  - Bumex 3mg x3 7/27 and 7/28 with good diuresis, continue bumex 3 mg IV BID today per cards, already net negative 1L today    Endocrine:    #Stress induced hyperglycemia  Preop A1c 5.5%  - Insulin glargine 20U and high sliding scale  - Goal BG <180 for optimal healing   - Holding glargine 7/29 in anticipation of possibly cycling tube feeding, has had minimal insulin requirements 7/29 with TF running. Will continue to hold and plan to  start cycle feeds 7/30, may need NPH if BG starts to elevate    ID/ Antibiotics:  #Periopearive antibiosis (s/p course of Cefepime, vancomycin, rifampin, fluconazole)  #HAP - Enterococcus faecalis PNA, s/p treatment   - 7/12 Pan culture: Bcx x2, UA/UC -> negative  - 7/14 Endotracheal sputum culture (4+ candida albicans, 1+ E.Coli, 3+ enterococcus faecalis)   - Zosyn for HAP(7/15-7/22)  - 7/18 C.diff- negative  - 7/25 Blood cultures drawn for unexplained hypotension   - NGTD    Heme:     #Acute blood loss anemia  #Acute blood loss thrombocytopenia  #History of DVT and PE   #Antiphospholipid antibody syndrome  #History of iron deficiency anemia  - Monitor for s/sx of bleeding  - Trend CBC and coags  - Hgb goal >8   Hgb stable 8s, no signs or symptoms of bleeding  - Plt goal > 20  - Nose bleed, lost 2 units of blood. Transfused for hgb 6.8, 2U received. Recheck ordered-9.0, stable this AM.   - Melena reported-3 stools over night   - Dose warfarin to keep INR just below 2.0.     Rheumatology:  #SLE  - Chronic: symptoms include arthritis, photosensitivity, fatigue, and skin manifestations  - PTA meds: hydroxychloroquine 200mg, resumed 7/16  - Rheumatology consulted and following. Follow dsDNA and complement levels(7/18)    -Will need cellcept restarted when appropriate.    Prophylaxis:    - DVT: SCD + high intensity heparin-held for bleeding + warfarin   - PPI    Disposition:  - Transfer to  on 7/28     Discussed with Dr. Julieta Gilman PA-C  Cardiothoracic Surgery  Pager [phone]  July 31, 2022    ----------------------------------------------------------------    Interval Events:  More SOB today, nurse feels he is having a panic attack and current medications not working. 3 stools with melena overnight per nursing. No abdominal pain. No N/V. Pain controlled.     OBJECTIVE:   1. VITAL SIGNS:   Temp:  [96.9  F (36.1  C)-97.6  F (36.4  C)] 97.5  F (36.4  C)  Pulse:  [105-115] 115  Resp:   [18-32] 24  BP: ()/(55-99) 104/83  SpO2:  [92 %-100 %] 99 %  Resp: 24      2. INTAKE/ OUTPUT:   I/O last 3 completed shifts:  In: 1540 [I.V.:20; NG/GT:265]  Out: 2975 [Urine:2975]    3. PHYSICAL EXAMINATION:   General: sitting in chair, comfortable  Neuro: follow commands, intermittent delirium cleared   Resp: RRR on RA  CV: tachycardic   Abdomen: Soft, mildly distended, Non-tender  Incisions: c/d/i, no erythema   Extremities: warm and well perfused  LVAD Flow 3.3-3.8, PI 4.1-6.1, Speed 5300, Power 3.7-3.9    4. INVESTIGATIONS:   Arterial Blood Gases   Recent Labs   Lab 07/25/22  2109 07/25/22  0914   PH 7.50* 7.50*   PCO2 46* 46*   PO2 140* 91   HCO3 36* 35*     Complete Blood Count   Recent Labs   Lab 07/31/22  0712 07/30/22  2330 07/30/22  1009 07/29/22  0525 07/28/22  1431 07/28/22  0419   WBC 10.0  --  11.7* 11.3*  --  9.9   HGB 9.2* 9.0* 6.8* 8.4*   < > 7.1*     --  439 366  --  348    < > = values in this interval not displayed.     Basic Metabolic Panel  Recent Labs   Lab 07/31/22  1638 07/31/22  1113 07/31/22  0809 07/31/22  0802 07/31/22  0712 07/30/22  1149 07/30/22  1009 07/29/22  0723 07/29/22  0525 07/28/22  1547 07/28/22  1431 07/28/22  0800 07/28/22  0419   NA  --   --   --   --  131*  --  133*  --  135*  --   --   --  134*   POTASSIUM  --   --   --   --  4.1  --  4.3  --  4.3  --  4.1  --  4.7   CHLORIDE  --   --   --   --  93*  --  94*  --  92*  --   --   --  94*   CO2  --   --   --   --  28  --  29  --  33*  --   --   --  33*   BUN  --   --   --   --  25.0*  --  20.7  --  16.9  --   --   --  18.0   CR  --   --   --   --  0.67  --  0.68  --  0.58*  --   --   --  0.58*   * 109* 154* 159* 144*   < > 142*   < > 115*   < >  --    < > 119*    < > = values in this interval not displayed.     Liver Function Tests  Recent Labs   Lab 07/31/22  0712 07/30/22  1009 07/29/22  0525 07/28/22  0419   AST 32 34 34 37   ALT 14 11 13 13   ALKPHOS 221* 195* 230* 223*   BILITOTAL 0.5 0.5 0.5 0.5    ALBUMIN 2.9* 2.8* 2.9* 2.7*   INR 1.62* 1.58* 1.37* 1.29*     Pancreatic Enzymes  No lab results found in last 7 days.  Coagulation Profile  Recent Labs   Lab 07/31/22  0712 07/30/22  1009 07/29/22  0525 07/28/22  0419   INR 1.62* 1.58* 1.37* 1.29*         5. RADIOLOGY:   Recent Results (from the past 24 hour(s))   XR Chest Port 1 View    Narrative    EXAM: XR Chest 1 view 7/31/2022 8:24 AM      HISTORY: SOB.    COMPARISON: Previous day.     TECHNIQUE: Frontal view of the chest.    FINDINGS: Left chest wall ICD with leads in stable position. LVAD.  Enteric tube is subdiaphragmatic with tip coming up from the study.  Right-sided CVC with tip in the high SVC. Trachea is midline.  Cardiomediastinal silhouette is stable. Stable interstitial pulmonary  opacities and bibasilar pulmonary opacities. Persistent right moderate  pleural effusion. Possible small left pleural effusion. No  pneumothoraces.      Impression    IMPRESSION:     1. Stable interstitial and bibasilar pulmonary opacities, likely  pulmonary edema.  2. Persistent right moderate pleural effusion.  3. Relatively unchanged support devices.    I have personally reviewed the examination and initial interpretation  and I agree with the findings.    JILL CARRANZA MD         SYSTEM ID:  [id number]       =========================================

## 2022-07-31 NOTE — PLAN OF CARE
Goal Outcome Evaluation:    Neuro: A&Ox3-4. Intermittently confused. Agitated/restless at times. PRN atarax given x1.  Cardiac: HM 3. Values WDL. ST. Rates 100-110s. Doppler MAPs 85-90. Afebrile.   Respiratory: Sating >92% on RA. Dyspnea on exertion, tachypneic.   GI/: Adequate urine output via marcelo. Loose stools overnight x2, dark in color.   Diet/appetite: Tolerating regular diet. Poor appetite. Cycled TF running at 75 ml/hour with 30 ml FWF Q4H. Calorie counts.  Activity:  Assist of 2 out of bed.  Pain: Persistent pain in shoulder and back. PRN oxycodone and dilaudid given   Skin: No new deficits noted.  LDA's: TL PICC, marcelo, LVAD, NJ    Plan: Continue with POC. Notify primary team with changes.

## 2022-08-01 ENCOUNTER — APPOINTMENT (OUTPATIENT)
Dept: CT IMAGING | Facility: CLINIC | Age: 62
DRG: 001 | End: 2022-08-01
Attending: PHYSICIAN ASSISTANT
Payer: COMMERCIAL

## 2022-08-01 ENCOUNTER — APPOINTMENT (OUTPATIENT)
Dept: ULTRASOUND IMAGING | Facility: CLINIC | Age: 62
DRG: 001 | End: 2022-08-01
Attending: PHYSICIAN ASSISTANT
Payer: COMMERCIAL

## 2022-08-01 ENCOUNTER — APPOINTMENT (OUTPATIENT)
Dept: INTERVENTIONAL RADIOLOGY/VASCULAR | Facility: CLINIC | Age: 62
DRG: 001 | End: 2022-08-01
Attending: PHYSICIAN ASSISTANT
Payer: COMMERCIAL

## 2022-08-01 ENCOUNTER — APPOINTMENT (OUTPATIENT)
Dept: GENERAL RADIOLOGY | Facility: CLINIC | Age: 62
DRG: 001 | End: 2022-08-01
Attending: PHYSICIAN ASSISTANT
Payer: COMMERCIAL

## 2022-08-01 ENCOUNTER — APPOINTMENT (OUTPATIENT)
Dept: GENERAL RADIOLOGY | Facility: CLINIC | Age: 62
DRG: 001 | End: 2022-08-01
Attending: INTERNAL MEDICINE
Payer: COMMERCIAL

## 2022-08-01 LAB
ALBUMIN SERPL BCG-MCNC: 3 G/DL (ref 3.5–5.2)
ALP SERPL-CCNC: 205 U/L (ref 40–129)
ALT SERPL W P-5'-P-CCNC: 14 U/L (ref 10–50)
ANION GAP SERPL CALCULATED.3IONS-SCNC: 10 MMOL/L (ref 7–15)
AST SERPL W P-5'-P-CCNC: 33 U/L (ref 10–50)
AT III ACT/NOR PPP CHRO: 94 % (ref 85–135)
BACTERIA BLD CULT: NO GROWTH
BASE EXCESS BLDA CALC-SCNC: 6.6 MMOL/L (ref -9–1.8)
BASE EXCESS BLDA CALC-SCNC: 7.5 MMOL/L (ref -9–1.8)
BILIRUB SERPL-MCNC: 0.4 MG/DL
BUN SERPL-MCNC: 21.6 MG/DL (ref 8–23)
CA-I BLD-MCNC: 4.4 MG/DL (ref 4.4–5.2)
CALCIUM SERPL-MCNC: 9 MG/DL (ref 8.8–10.2)
CHLORIDE SERPL-SCNC: 93 MMOL/L (ref 98–107)
CREAT SERPL-MCNC: 0.67 MG/DL (ref 0.67–1.17)
D DIMER PPP FEU-MCNC: 6.19 UG/ML FEU (ref 0–0.5)
DEPRECATED HCO3 PLAS-SCNC: 28 MMOL/L (ref 22–29)
ERYTHROCYTE [DISTWIDTH] IN BLOOD BY AUTOMATED COUNT: 18.1 % (ref 10–15)
FIBRINOGEN PPP-MCNC: 491 MG/DL (ref 170–490)
GFR SERPL CREATININE-BSD FRML MDRD: >90 ML/MIN/1.73M2
GLUCOSE BLDC GLUCOMTR-MCNC: 102 MG/DL (ref 70–99)
GLUCOSE BLDC GLUCOMTR-MCNC: 108 MG/DL (ref 70–99)
GLUCOSE BLDC GLUCOMTR-MCNC: 109 MG/DL (ref 70–99)
GLUCOSE BLDC GLUCOMTR-MCNC: 133 MG/DL (ref 70–99)
GLUCOSE BLDC GLUCOMTR-MCNC: 135 MG/DL (ref 70–99)
GLUCOSE BLDC GLUCOMTR-MCNC: 157 MG/DL (ref 70–99)
GLUCOSE BLDC GLUCOMTR-MCNC: 163 MG/DL (ref 70–99)
GLUCOSE BLDC GLUCOMTR-MCNC: 171 MG/DL (ref 70–99)
GLUCOSE SERPL-MCNC: 148 MG/DL (ref 70–99)
HCO3 BLD-SCNC: 30 MMOL/L (ref 21–28)
HCO3 BLD-SCNC: 31 MMOL/L (ref 21–28)
HCT VFR BLD AUTO: 29.2 % (ref 40–53)
HGB BLD-MCNC: 9.1 G/DL (ref 13.3–17.7)
HGB BLD-MCNC: 9.3 G/DL (ref 13.3–17.7)
HGB BLD-MCNC: 9.4 G/DL (ref 13.3–17.7)
INR PPP: 1.87 (ref 0.85–1.15)
LACTATE SERPL-SCNC: 1.4 MMOL/L (ref 0.7–2)
MAGNESIUM SERPL-MCNC: 2.1 MG/DL (ref 1.7–2.3)
MCH RBC QN AUTO: 29.4 PG (ref 26.5–33)
MCHC RBC AUTO-ENTMCNC: 31.2 G/DL (ref 31.5–36.5)
MCV RBC AUTO: 95 FL (ref 78–100)
O2/TOTAL GAS SETTING VFR VENT: 21 %
O2/TOTAL GAS SETTING VFR VENT: 21 %
OXYHGB MFR BLD: 91 % (ref 92–100)
PCO2 BLD: 37 MM HG (ref 35–45)
PCO2 BLD: 38 MM HG (ref 35–45)
PH BLD: 7.52 [PH] (ref 7.35–7.45)
PH BLD: 7.52 [PH] (ref 7.35–7.45)
PHOSPHATE SERPL-MCNC: 4.6 MG/DL (ref 2.5–4.5)
PLATELET # BLD AUTO: 406 10E3/UL (ref 150–450)
PO2 BLD: 44 MM HG (ref 80–105)
PO2 BLD: 61 MM HG (ref 80–105)
POTASSIUM SERPL-SCNC: 4.1 MMOL/L (ref 3.4–5.3)
PROT SERPL-MCNC: 6.7 G/DL (ref 6.4–8.3)
RBC # BLD AUTO: 3.09 10E6/UL (ref 4.4–5.9)
SODIUM SERPL-SCNC: 131 MMOL/L (ref 136–145)
WBC # BLD AUTO: 10.6 10E3/UL (ref 4–11)

## 2022-08-01 PROCEDURE — 71045 X-RAY EXAM CHEST 1 VIEW: CPT | Mod: 26 | Performed by: RADIOLOGY

## 2022-08-01 PROCEDURE — 250N000013 HC RX MED GY IP 250 OP 250 PS 637: Performed by: SURGERY

## 2022-08-01 PROCEDURE — 74018 RADEX ABDOMEN 1 VIEW: CPT | Mod: 26 | Performed by: RADIOLOGY

## 2022-08-01 PROCEDURE — 36600 WITHDRAWAL OF ARTERIAL BLOOD: CPT

## 2022-08-01 PROCEDURE — 85018 HEMOGLOBIN: CPT | Performed by: PHYSICIAN ASSISTANT

## 2022-08-01 PROCEDURE — 70450 CT HEAD/BRAIN W/O DYE: CPT

## 2022-08-01 PROCEDURE — 250N000013 HC RX MED GY IP 250 OP 250 PS 637: Performed by: STUDENT IN AN ORGANIZED HEALTH CARE EDUCATION/TRAINING PROGRAM

## 2022-08-01 PROCEDURE — 76604 US EXAM CHEST: CPT

## 2022-08-01 PROCEDURE — 82330 ASSAY OF CALCIUM: CPT | Performed by: SURGERY

## 2022-08-01 PROCEDURE — C9113 INJ PANTOPRAZOLE SODIUM, VIA: HCPCS | Performed by: PHYSICIAN ASSISTANT

## 2022-08-01 PROCEDURE — 250N000013 HC RX MED GY IP 250 OP 250 PS 637: Performed by: ANESTHESIOLOGY

## 2022-08-01 PROCEDURE — 85300 ANTITHROMBIN III ACTIVITY: CPT | Performed by: SURGERY

## 2022-08-01 PROCEDURE — 87040 BLOOD CULTURE FOR BACTERIA: CPT | Performed by: SURGERY

## 2022-08-01 PROCEDURE — 82805 BLOOD GASES W/O2 SATURATION: CPT

## 2022-08-01 PROCEDURE — 85384 FIBRINOGEN ACTIVITY: CPT | Performed by: SURGERY

## 2022-08-01 PROCEDURE — 36592 COLLECT BLOOD FROM PICC: CPT | Performed by: SURGERY

## 2022-08-01 PROCEDURE — 76604 US EXAM CHEST: CPT | Mod: 26 | Performed by: STUDENT IN AN ORGANIZED HEALTH CARE EDUCATION/TRAINING PROGRAM

## 2022-08-01 PROCEDURE — 82803 BLOOD GASES ANY COMBINATION: CPT | Performed by: PHYSICIAN ASSISTANT

## 2022-08-01 PROCEDURE — 83735 ASSAY OF MAGNESIUM: CPT | Performed by: SURGERY

## 2022-08-01 PROCEDURE — 99233 SBSQ HOSP IP/OBS HIGH 50: CPT | Mod: GC | Performed by: INTERNAL MEDICINE

## 2022-08-01 PROCEDURE — 250N000011 HC RX IP 250 OP 636: Performed by: SURGERY

## 2022-08-01 PROCEDURE — C1729 CATH, DRAINAGE: HCPCS

## 2022-08-01 PROCEDURE — 36592 COLLECT BLOOD FROM PICC: CPT | Performed by: PHYSICIAN ASSISTANT

## 2022-08-01 PROCEDURE — 250N000011 HC RX IP 250 OP 636: Performed by: STUDENT IN AN ORGANIZED HEALTH CARE EDUCATION/TRAINING PROGRAM

## 2022-08-01 PROCEDURE — 70450 CT HEAD/BRAIN W/O DYE: CPT | Mod: 26 | Performed by: RADIOLOGY

## 2022-08-01 PROCEDURE — 250N000009 HC RX 250: Performed by: PHYSICIAN ASSISTANT

## 2022-08-01 PROCEDURE — 120N000003 HC R&B IMCU UMMC

## 2022-08-01 PROCEDURE — 999N000065 XR ABDOMEN PORT 1 VIEW

## 2022-08-01 PROCEDURE — 99233 SBSQ HOSP IP/OBS HIGH 50: CPT

## 2022-08-01 PROCEDURE — 250N000013 HC RX MED GY IP 250 OP 250 PS 637: Performed by: THORACIC SURGERY (CARDIOTHORACIC VASCULAR SURGERY)

## 2022-08-01 PROCEDURE — 250N000013 HC RX MED GY IP 250 OP 250 PS 637: Performed by: INTERNAL MEDICINE

## 2022-08-01 PROCEDURE — 71045 X-RAY EXAM CHEST 1 VIEW: CPT

## 2022-08-01 PROCEDURE — 82435 ASSAY OF BLOOD CHLORIDE: CPT | Performed by: SURGERY

## 2022-08-01 PROCEDURE — 32557 INSERT CATH PLEURA W/ IMAGE: CPT | Mod: RT | Performed by: PHYSICIAN ASSISTANT

## 2022-08-01 PROCEDURE — 85379 FIBRIN DEGRADATION QUANT: CPT | Performed by: SURGERY

## 2022-08-01 PROCEDURE — 0W9930Z DRAINAGE OF RIGHT PLEURAL CAVITY WITH DRAINAGE DEVICE, PERCUTANEOUS APPROACH: ICD-10-PCS | Performed by: PHYSICIAN ASSISTANT

## 2022-08-01 PROCEDURE — 83605 ASSAY OF LACTIC ACID: CPT | Performed by: SURGERY

## 2022-08-01 PROCEDURE — 250N000011 HC RX IP 250 OP 636: Performed by: PHYSICIAN ASSISTANT

## 2022-08-01 PROCEDURE — 250N000011 HC RX IP 250 OP 636

## 2022-08-01 PROCEDURE — C1769 GUIDE WIRE: HCPCS

## 2022-08-01 PROCEDURE — 85027 COMPLETE CBC AUTOMATED: CPT | Performed by: SURGERY

## 2022-08-01 PROCEDURE — 84100 ASSAY OF PHOSPHORUS: CPT | Performed by: SURGERY

## 2022-08-01 PROCEDURE — 85610 PROTHROMBIN TIME: CPT | Performed by: SURGERY

## 2022-08-01 RX ORDER — LORAZEPAM 2 MG/ML
INJECTION INTRAMUSCULAR
Status: COMPLETED
Start: 2022-08-01 | End: 2022-08-01

## 2022-08-01 RX ORDER — WARFARIN SODIUM 6 MG/1
12 TABLET ORAL
Status: COMPLETED | OUTPATIENT
Start: 2022-08-01 | End: 2022-08-01

## 2022-08-01 RX ADMIN — Medication 1 PACKET: at 07:39

## 2022-08-01 RX ADMIN — CAPTOPRIL 12.5 MG: 12.5 TABLET ORAL at 07:42

## 2022-08-01 RX ADMIN — ACETAMINOPHEN 975 MG: 325 TABLET, FILM COATED ORAL at 15:08

## 2022-08-01 RX ADMIN — LIDOCAINE PATCH 4% 1 PATCH: 40 PATCH TOPICAL at 07:38

## 2022-08-01 RX ADMIN — Medication 1 PACKET: at 13:21

## 2022-08-01 RX ADMIN — ATORVASTATIN CALCIUM 40 MG: 40 TABLET, FILM COATED ORAL at 19:54

## 2022-08-01 RX ADMIN — FLUTICASONE PROPIONATE AND SALMETEROL XINAFOATE 2 PUFF: 115; 21 AEROSOL, METERED RESPIRATORY (INHALATION) at 20:18

## 2022-08-01 RX ADMIN — DIGOXIN 125 MCG: 125 TABLET ORAL at 07:38

## 2022-08-01 RX ADMIN — PANTOPRAZOLE SODIUM 40 MG: 40 INJECTION, POWDER, FOR SOLUTION INTRAVENOUS at 19:55

## 2022-08-01 RX ADMIN — FLUTICASONE PROPIONATE AND SALMETEROL XINAFOATE 2 PUFF: 115; 21 AEROSOL, METERED RESPIRATORY (INHALATION) at 07:42

## 2022-08-01 RX ADMIN — ACETAMINOPHEN 650 MG: 325 TABLET, FILM COATED ORAL at 22:21

## 2022-08-01 RX ADMIN — HYDROXYCHLOROQUINE SULFATE 200 MG: 200 TABLET, FILM COATED ORAL at 19:54

## 2022-08-01 RX ADMIN — LORAZEPAM 0.5 MG: 2 INJECTION INTRAMUSCULAR; INTRAVENOUS at 01:36

## 2022-08-01 RX ADMIN — Medication 1 PACKET: at 20:04

## 2022-08-01 RX ADMIN — WARFARIN SODIUM 12 MG: 6 TABLET ORAL at 17:17

## 2022-08-01 RX ADMIN — LIDOCAINE HYDROCHLORIDE 10 ML: 10 INJECTION, SOLUTION EPIDURAL; INFILTRATION; INTRACAUDAL; PERINEURAL at 11:12

## 2022-08-01 RX ADMIN — TRAZODONE HYDROCHLORIDE 50 MG: 50 TABLET ORAL at 22:42

## 2022-08-01 RX ADMIN — OXYCODONE HYDROCHLORIDE 5 MG: 5 TABLET ORAL at 04:58

## 2022-08-01 RX ADMIN — LORAZEPAM 0.5 MG: 2 INJECTION INTRAMUSCULAR at 01:36

## 2022-08-01 RX ADMIN — HYDROXYCHLOROQUINE SULFATE 200 MG: 200 TABLET, FILM COATED ORAL at 07:37

## 2022-08-01 RX ADMIN — INSULIN ASPART 2 UNITS: 100 INJECTION, SOLUTION INTRAVENOUS; SUBCUTANEOUS at 07:46

## 2022-08-01 RX ADMIN — BUMETANIDE 3 MG: 0.25 INJECTION, SOLUTION INTRAMUSCULAR; INTRAVENOUS at 07:48

## 2022-08-01 RX ADMIN — OLANZAPINE 5 MG: 5 TABLET, FILM COATED ORAL at 07:38

## 2022-08-01 RX ADMIN — OXYCODONE HYDROCHLORIDE 5 MG: 5 TABLET ORAL at 21:12

## 2022-08-01 RX ADMIN — HYDROMORPHONE HYDROCHLORIDE 0.4 MG: 0.2 INJECTION, SOLUTION INTRAMUSCULAR; INTRAVENOUS; SUBCUTANEOUS at 18:13

## 2022-08-01 RX ADMIN — CAPTOPRIL 12.5 MG: 12.5 TABLET ORAL at 19:54

## 2022-08-01 RX ADMIN — Medication 1 PACKET: at 07:38

## 2022-08-01 RX ADMIN — HYDROXYZINE HYDROCHLORIDE 50 MG: 25 TABLET ORAL at 06:22

## 2022-08-01 RX ADMIN — OXYCODONE HYDROCHLORIDE 5 MG: 5 TABLET ORAL at 08:54

## 2022-08-01 RX ADMIN — OXYCODONE HYDROCHLORIDE 5 MG: 5 TABLET ORAL at 22:43

## 2022-08-01 RX ADMIN — Medication 10 MG: at 19:54

## 2022-08-01 RX ADMIN — BUMETANIDE 3 MG: 0.25 INJECTION, SOLUTION INTRAMUSCULAR; INTRAVENOUS at 17:16

## 2022-08-01 RX ADMIN — MULTIVITAMIN 15 ML: LIQUID ORAL at 07:40

## 2022-08-01 RX ADMIN — HYDROXYZINE HYDROCHLORIDE 25 MG: 25 TABLET ORAL at 21:29

## 2022-08-01 RX ADMIN — ASPIRIN 81 MG CHEWABLE TABLET 81 MG: 81 TABLET CHEWABLE at 07:37

## 2022-08-01 RX ADMIN — DULOXETINE 30 MG: 30 CAPSULE, DELAYED RELEASE ORAL at 07:37

## 2022-08-01 RX ADMIN — BUMETANIDE 3 MG: 0.25 INJECTION, SOLUTION INTRAMUSCULAR; INTRAVENOUS at 11:51

## 2022-08-01 RX ADMIN — ACETAMINOPHEN 975 MG: 325 TABLET, FILM COATED ORAL at 07:37

## 2022-08-01 RX ADMIN — PANTOPRAZOLE SODIUM 40 MG: 40 INJECTION, POWDER, FOR SOLUTION INTRAVENOUS at 07:36

## 2022-08-01 RX ADMIN — OLANZAPINE 5 MG: 5 TABLET, FILM COATED ORAL at 19:57

## 2022-08-01 RX ADMIN — CAPTOPRIL 12.5 MG: 12.5 TABLET ORAL at 13:21

## 2022-08-01 RX ADMIN — OXYCODONE HYDROCHLORIDE 5 MG: 5 TABLET ORAL at 15:08

## 2022-08-01 RX ADMIN — OXYCODONE HYDROCHLORIDE 5 MG: 5 TABLET ORAL at 13:21

## 2022-08-01 ASSESSMENT — ACTIVITIES OF DAILY LIVING (ADL)
ADLS_ACUITY_SCORE: 38
ADLS_ACUITY_SCORE: 40
ADLS_ACUITY_SCORE: 40
ADLS_ACUITY_SCORE: 38
ADLS_ACUITY_SCORE: 40
ADLS_ACUITY_SCORE: 38

## 2022-08-01 NOTE — PROGRESS NOTES
Cardiology Progress Note    Assessment & Plan   Dandy Sands is a 61 year old male with PMH of lupus, antiphospholipid syndrome, HTN, HLD, HFrEF 2/2 ICM who presented with cardiogenic shock c/b multiorgan failure (JOSE, shock liver) requiring mechanical support with IABP, now s/p HM3 LVAD 7/8/22 by Dr. Zamora with intraoperative course c/b RV failure s/p 29F Protek duo via RIJ. Post op course c/b hemopericardium s/p repeat washout with chest left open. Chest closed 7/13/22 and decannulated from RVAD 7/21    Changes Today:  - Agree with IR consult for possible thoracentesis/pigtail  - No changes to cardiac meds planned today     #HFrEF 2/2 ICM s/p HM3 LVAD in setting of cardiogenic shock (SCAI D)  #RV failure s/p Protek duo temporary RVAD s/p decannulation 7/21  #RV thrombus  #Post chest closure hemopericardium s/p repeat washout (7/12)  Patient with ICM s/p HM3 LVAD 7/8/22 by Dr. Zamora in setting of presentation for cardiogenic shock requiring MCS with IABP as prior to HM (initially evaluated for heart transplant, however noted to have substantially elevated DSAs during course of care, so decision made to proceed with LVAD given patient already on temporary MCS). Intraoperative course c/b RV failure resulting in cardiogenic shock requiring high dose pressors necessitating RVAD support. Initial post-op course c/b hemopericardium requiring repeat washout with chest left open, however has since recovered well with decannulation on 7/21 with extubation day prior. Has continued to tolerate well from hemodynamic and end organ standpoint. Continuing to work on pulmonary toilet and diuresis to improve his oxygenation and total body fluid status.   -LVAD: continues to have good flows, no alarms and stable end organ perfusion; continue speed at 5300  -continue digoxin for RV support  -AC, antiplatelets: continue ASA; heparin high intensity on hold given epistaxis/bleeding concerns, defer to surgery when it would be  "appropriate to restart; warfarin started and awaiting therapeutic level  -Volume status: Continue bumex 3mg IV BID with goal negative ~ 1-2L  -rhythm: sinus              -of note, patient with climbing capture threshold of RV lead; will need to be evaluated in future by EP team  -blood pressure: Continue captopril to 12.5mg TID, may increase if MAPs remain elevated  -encourage ICS, pulmonary toilet  -delirium precautions     The patient's care was discussed with the Attending Physician, Dr. Faustin, Bedside Nurse and Primary team    Chris Lawrence MD  Cardiology Fellow      Interval History   Remains tachypnic. Endorses no improvement in symptoms despite diuresis.    Physical Exam   Temp: 97.6  F (36.4  C) Temp src: Axillary BP: 115/81 Pulse: 110   Resp: 20 SpO2: 91 % O2 Device: None (Room air) Oxygen Delivery: 2 LPM  Vitals:    07/29/22 0319 07/30/22 0600 08/01/22 0618   Weight: 69.8 kg (153 lb 12.8 oz) 68.6 kg (151 lb 3.8 oz) 67.8 kg (149 lb 7.6 oz)     Vital Signs with Ranges  Temp:  [97.4  F (36.3  C)-97.7  F (36.5  C)] 97.6  F (36.4  C)  Pulse:  [105-115] 110  Resp:  [18-24] 20  BP: ()/(43-94) 115/81  SpO2:  [91 %-100 %] 91 %  I/O last 3 completed shifts:  In: 1465 [P.O.:120; NG/GT:295]  Out: 3400 [Urine:3400]     , Blood pressure 115/81, pulse 110, temperature 97.6  F (36.4  C), temperature source Axillary, resp. rate 20, height 1.702 m (5' 7\"), weight 67.8 kg (149 lb 7.6 oz), SpO2 91 %.  149 lbs 7.55 oz  General Appearance:  Older male in NAD   Respiratory: coarse lung sounds, increase work of breathing  Cardiovascular: vad hum, driveline site c/d/i, chest incisions c/d/i, JVD ~10-12 cm H20  GI: soft, nt, bs active  Skin: warm, well perfused, trace diffuse edema  Neuro: awake, alert, interactive, speech fluent, moving all 4 extremities    Medications     dextrose         acetaminophen  975 mg Oral or Feeding Tube Q8H CAMMY     aspirin  81 mg Oral or Feeding Tube Daily     atorvastatin  40 mg Oral QPM     " bumetanide  3 mg Intravenous TID     captopril  12.5 mg Oral TID     digoxin  125 mcg Oral Daily     DULoxetine  30 mg Oral Daily     fiber modular (NUTRISOURCE FIBER)  1 packet Per Feeding Tube TID     fluticasone-salmeterol  2 puff Inhalation BID     [START ON 8/3/2022] fluticasone-vilanterol  1 puff Inhalation Daily     hydroxychloroquine  200 mg Oral BID     insulin aspart  1-12 Units Subcutaneous Q4H     [Held by provider] insulin glargine  20 Units Subcutaneous QAM AC     lidocaine  1 patch Transdermal Q24h    And     lidocaine   Transdermal Q8H CAMMY     lidocaine 3%, phenylephrine 0.25% solution for irrigation  5 mL Irrigation Once     melatonin  10 mg Oral QPM     multivitamins w/minerals  15 mL Per Feeding Tube Daily     OLANZapine  5 mg Oral BID     oxidized cellulose   Topical Once     oxyCODONE  5 mg Oral or Feeding Tube Q8H     pantoprazole (PROTONIX) IV  40 mg Intravenous BID     protein modular  1 packet Per Feeding Tube TID     silver nitrate   Topical Once     traZODone  25 mg Oral At Bedtime    Or     traZODone  50 mg Oral At Bedtime     Warfarin Therapy Reminder  1 each Oral See Admin Instructions       Data   Recent Labs   Lab 08/01/22  0743 08/01/22  0742 08/01/22  0654 08/01/22  0411 07/31/22  2340 07/31/22  0802 07/31/22  0712 07/30/22  1149 07/30/22  1009   WBC  --  10.6  --   --   --   --  10.0  --  11.7*   HGB  --  9.1*  9.1*  --   --  9.1*  --  9.2*   < > 6.8*   MCV  --  95  --   --   --   --  93  --  95   PLT  --  406  --   --   --   --  335  --  439   INR  --  1.87*  --   --   --   --  1.62*  --  1.58*   NA  --  131*  --   --   --   --  131*  --  133*   POTASSIUM  --  4.1  --   --   --   --  4.1  --  4.3   CHLORIDE  --  93*  --   --   --   --  93*  --  94*   CO2  --  28  --   --   --   --  28  --  29   BUN  --  21.6  --   --   --   --  25.0*  --  20.7   CR  --  0.67  --   --   --   --  0.67  --  0.68   ANIONGAP  --  10  --   --   --   --  10  --  10   ELDA  --  9.0  --   --   --   --  8.9   --  8.6*   * 148* 163*   < >  --    < > 144*   < > 142*   ALBUMIN  --  3.0*  --   --   --   --  2.9*  --  2.8*   PROTTOTAL  --  6.7  --   --   --   --  6.5  --  6.4   BILITOTAL  --  0.4  --   --   --   --  0.5  --  0.5   ALKPHOS  --  205*  --   --   --   --  221*  --  195*   ALT  --  14  --   --   --   --  14  --  11   AST  --  33  --   --   --   --  32  --  34    < > = values in this interval not displayed.       Recent Results (from the past 24 hour(s))   XR Abdomen Port 1 View    Narrative    EXAMINATION:  XR ABDOMEN PORT 1 VIEW 8/1/2022 4:37 AM     COMPARISON: 7/30/2022    HISTORY: NG tube withdrawn several cm by patient. Replaced by nursing,  evaluate positioning    TECHNIQUE: Frontal view of the abdomen.    FINDINGS: Feeding tube tip projects over the jejunum. Stable IVC  filter. Stable LVAD, pacemaker. Normal bowel gas pattern. Portion of a  tooth with crown project over the right lower quadrant. Cardiomegaly.  Bilateral basilar atelectasis and likely small pleural effusions.      Impression    IMPRESSION:   1. Feeding tube tip projects over the jejunum.  2. Portion of a tooth with a crown projects over the right lower  quadrant.  2. No obstructive bowel gas pattern.    I have personally reviewed the examination and initial interpretation  and I agree with the findings.    LAI HERNADEZ MD         SYSTEM ID:  [id number]

## 2022-08-01 NOTE — PROCEDURES
[facility]    Procedure: IR Procedure Note    Date/Time: 8/1/2022 11:26 AM  Performed by: Antonio Mendoza PA-C  Authorized by: Antonio Mendoza PA-C   IR Fellow Physician:  Other(s) attending procedure: Assist: Chaparrita PAREDES      UNIVERSAL PROTOCOL   Site Marked: NA  Prior Images Obtained and Reviewed:  Yes  Required items: Required blood products, implants, devices and special equipment available    Patient identity confirmed:  Verbally with patient, arm band, provided demographic data and hospital-assigned identification number  Patient was reevaluated immediately before administering moderate or deep sedation or anesthesia  Confirmation Checklist:  Patient's identity using two indicators, relevant allergies, procedure was appropriate and matched the consent or emergent situation and correct equipment/implants were available  Time out: Immediately prior to the procedure a time out was called    Universal Protocol: the Joint Commission Universal Protocol was followed    Preparation: Patient was prepped and draped in usual sterile fashion    ESBL (mL):  0.5     ANESTHESIA    Anesthesia: Local infiltration  Local Anesthetic:  Lidocaine 1% without epinephrine  Anesthetic Total (mL):  8      SEDATION    Patient Sedated: No    Vital signs: Vital signs monitored during sedation    See dictated procedure note for full details.  Findings: Ultrasound guided right sided 14 Fr non-locking Flexima J tip chest tube placement. Return of clear serous fluid.     Specimens: none    Complications: None    Condition: Stable    Plan: Follow up per primary team.       PROCEDURE    Patient Tolerance:  Patient tolerated the procedure well with no immediate complications  Length of time physician/provider present for 1:1 monitoring during sedation: 0

## 2022-08-01 NOTE — CONSULTS
"    Interventional Radiology Consult Service Note    Consult Request: Right chest tube placement    History: Dandy Sands is a 61 year old male with history of extensive cardiac history now s/p RVAD and LVAD placement 7/8/22. His post-op course has been complicated by IABP dysfunction and RV failure requiring bypass/vasopressor support, NP and protek placement. Now RVAD removed 7/21.    He has worsening SOB with moderate pleural effusion. IR consult placed for right chest tube placement.    Imaging Reviewed: CXR 7/31/22, 7/30/22    Vitals:   /81 (BP Location: Left arm, Cuff Size: Adult Small)   Pulse 110   Temp 97.6  F (36.4  C) (Axillary)   Resp 20   Ht 1.702 m (5' 7\")   Wt 67.8 kg (149 lb 7.6 oz)   SpO2 91%   BMI 23.41 kg/m      Pertinent Labs:     Lab Results   Component Value Date    WBC 10.6 08/01/2022    WBC 10.0 07/31/2022    WBC 11.7 (H) 07/30/2022       Lab Results   Component Value Date    HGB 9.1 08/01/2022    HGB 9.1 08/01/2022    HGB 9.1 07/31/2022       Lab Results   Component Value Date     08/01/2022     07/31/2022     07/30/2022       Lab Results   Component Value Date    INR 1.62 (H) 07/31/2022     (H) 07/24/2022     Plan:    Ordered stat US to look for pleural effusion. Imaging shows a small amount of fluid, but unclear. IR will evaluate when patient arrives.    Patient is on IR schedule Monday 8/1/22 for a right chest tube placement. Plan for 12-14Fr chest tube placement.   Labs WNL for procedure.    No NPO required.  Medications to be held include: None  Consent will be done prior to procedure.     Please contact the IR charge RN at [phone] for estimated time of procedure.     Case discussed with Dr. Jeffers.     Saskia Weller PA-C  Interventional Radiology  Pager: [phone]    "

## 2022-08-01 NOTE — PLAN OF CARE
Neuro: A&Ox3-4. Intermittently confused. Agitated/restless at times. Sitter at bedside.   Cardiac: HM 3. Values WDL. ST. Rates 100-110s. Doppler MAPs 70's-80's. Afebrile.             Respiratory: Sating >92% on RA. Dyspnea on exertion, tachypneic.   GI/: Adequate urine output via marcelo. Loose stools x1.   Diet/appetite: Tolerating regular diet. Poor appetite. Cycled TF running at 75 ml/hour with 30 ml FWF Q4H from 6p-10a.   Activity: Assist of 2 out of bed.  Pain: Persistent pain in shoulder and back. PRN oxycodone given x2.   Skin: No new deficits noted.  LDA's: TL PICC, marcelo, LVAD, NJ, R CT -20 suction.   Plan: Head CT and ABG completed. Continue with POC. Notify primary team with changes.

## 2022-08-01 NOTE — IR NOTE
Interventional Radiology Intra-procedural Nursing Note    Patient Name: Dandy Sands  Medical Record Number: [id number]  Today's Date: August 1, 2022    Procedure: right chest tube placement    Proceduralist: NAI Garrett PA-S    Procedure Start Time: 1044  Procedure Puncture time: 1044  Procedure End Time: 1118    Sedation notes: local lidocaine 1%    Report given to: Loli LONDON 6B  : not needed    D: Patient brought to IR room 6 at 1015. Verified Patient's ID and informed consent using two identifiers. He denied having questions or concerns regarding the procedure.  A: Patient was positioned side lying with left side down. He was monitored per IR conscious sedation protocol. Patient tolerated the procedure without apparent incident. The right chest tube connections were securely banded. The dressing over the chest tube site is clean, dry and intact.   P: Patient returned to 6B for post procedure recovery and continued cares.    Lynette Tovar RN  [phone]

## 2022-08-01 NOTE — PLAN OF CARE
Goal Outcome Evaluation:    Neuro: A&Ox3-4. Intermittently confused to time. Anxious most of the night, PRN 50 mg atarax given x2 and 0.5 mg lorazepam given x1. Sitter remains for fall risk/impulsivity.  Cardiac: HM 3 LVAD. Values WDL. ST. Rates 100-110s. Doppler maps 80-90. Afebrile.   Respiratory: Sating >92% on RA. Tachypnic. RR 20-26. LS on right side course.  GI/: Adequate urine output via marcelo. Loose BM overnight.  Diet/appetite: Tolerating cyclical TF. Running from 6p-10a at 75 mls/hour via NJ with FWF 30 ml Q4H. Regular diet. Poor appetite. Only oral intake overnight was water/ice chips. Intermittent nausea. IV zofran given x1 with relief reported.  Activity:  Assist of 2 out of bed.   Pain: At acceptable level on current regimen.   Skin: No new deficits noted. Driveline exit site reddened. No drainage noted. Skin on dressing site scabbed/irritated. Blanchable redness on coccyx.   LDA's: HM3, TL PICC, NJ, Marcelo    Plan: Possible pigtail placement for right pleural effusion today. Continue with POC. Notify primary team with changes.

## 2022-08-01 NOTE — PROGRESS NOTES
CVTS PROGRESS NOTE  08/01/2022      ASSESSMENT Dandy Sands is a 61 year old male with a PMH of CAD s/p PCI to LAD, MI, ICM, acute on chronic heart failure, s/p CRT-D, severe MR, antiphospholipid antibody syndrome on chronic warfarin, SLE, HTN, HLD, recent hospitalization 5/16-5/26 s/p RCA, LAD stenting who underwent RVAD (29F Protek duo RIJ) LVAD placement (HeartMate 3) on 7/8/22 by Dr. Zamora. Intraoperative course complicated by IABP dysfunction and RV failure, requiring crash onto bypass / vasopressor support, NO, and protek placement. Now s/p chest closure and NO wean 7/11. Return to OR for hemopericardium (7/12) and chest re-closure 7/13. Protek RVAD removed 7/21.    PLAN:   Neuro/ pain/ sedation:  #Acute Postoperative pain  #Depression   #Anxiety due to a medical condition  #Insomnia  #Delerium   Delirious intermittently.   - Monitor neurological status. Notify the MD for any acute changes in exam.  - Pain:    -Scheduled tylenol, uriel oxy 5mg q8h, wean off scheduled Oxy 7/29   -PRN oxycodone 5mg q6h  - Depression: Duloxetine 30mg  - Sleep: Melatonin 10 mg HS   - discontinue Seroquel 100 mg HS   - start trazodone    - Zyprexa 5mg BID  - Anxiety: Valproate po 250 mg BID and 500mg Bedtime   - Hydroxyzine PRN  - PTA meds: hydroxyzine 25mg PRN, zolpidem 5mg, melatonin 3mg, lorazepam 0.5mg  - QTc(7/18)- 683 msec, no haldol  - Ativan 0.5-1.0mg IV q6h PRN added for anxiety  - Will check head ct given recent fall on 7/29, along with increased delirium over the weekend. Also will check an ABG  - Continue sitter     Pulmonary:   #Acute hypoxic respiratory failure on iMV  #Mild-moderate emphysema  #History of COVID-19  Nasal cannula 4L   - Maintain SpO2> 92%  - Incentive spirometry   - SOB-moderate pleural effusion CXR 7/31 Consult IR for right pigtail.      Cardiovascular:    #S/p LVAD placement (HeartMate 3) on 7/8/22   #S/p RVAD (29F Christak duo RIJ) on 7/8/22  #S/p chest closure 7/11  #Cardiogenic shock  #Advanced  ischemic cardiomyopathy, class IV, stage D  #CAD s/p PCI to LAD (2005)  #S/p CRT-D (2006)  #History of MI (2007)  #Severe MR  #Antiphospholipid antibody syndrome on chronic warfarin  #HTN, HLD  #Recent hospitalization 5/16-5/26 s/p RCA, LAD stenting  #Atrial fibrillation   # RV mobile clot   - Monitor hemodynamic status. Chest closure and oxygenator splicing 7/11. Reopened 7/12 for I&D and left open, closed 7/13. RVAD removed 7/21.  - Goal MAP > 65   - PTA meds(held): clopidogrel 75mg, lasix 40mg, warfarin 5mg  - LVAD management per Advanced HF service  - AC Plan: High intensity Heparin for RV mobile thrombus  - Aspirin 81 mg  - Atorvastatin 40mg   - Hydralazine uriel and prn MAP >85 discontinued  - Captopril 6.25mg TID dose titration per cardiology   - Warfarin 7/24 per pharmacy    GI / Nutrition:   #GERD  #Congestive hepatopathy in the setting of cardiac insuffiencey -resolved  #Hematemesis / oral mucosal bleeding -resolved    - Full liquid diet, SLP following  - Continue NJT at goal   - Nutrition following  - Bowel regimen prn (miralax / senna)  - Trend LFT's every other day  - GI consulted 7/31 for black tarry stools, possible GI bleed, more likely swallowed blood from epistaxis.   - Continue IV PPI BID    Renal/ Fluid Balance:    - Strict I/O, daily weights   - Avoid/limit nephrotoxins as able  - Replete lytes PRN per protocol  - PTA meds: tamsulosin 0.4mg (held)  - Goal net negative ~ 1L   Bumex gtt for part of 7/24: total UOP 8L, gtt stopped   Bumex 4mg x2 7/25 with over 6L UOP  - Bumex 3mg x3 7/27 and 7/28 with good diuresis, continue bumex 3 mg IV BID today per cards, increased to TID on 7/31 given SOB    Endocrine:    #Stress induced hyperglycemia  Preop A1c 5.5%  - Insulin glargine 20U and high sliding scale  - Goal BG <180 for optimal healing   - Holding glargine 7/29 in anticipation of possibly cycling tube feeding, has had minimal insulin requirements 7/29 with TF running. Will continue to hold and plan  to start cycle feeds 7/30, may need NPH if BG starts to elevate    ID/ Antibiotics:  #Periopearive antibiosis (s/p course of Cefepime, vancomycin, rifampin, fluconazole)  #HAP - Enterococcus faecalis PNA, s/p treatment   - 7/12 Pan culture: Bcx x2, UA/UC -> negative  - 7/14 Endotracheal sputum culture (4+ candida albicans, 1+ E.Coli, 3+ enterococcus faecalis)   - Zosyn for HAP(7/15-7/22)  - 7/18 C.diff- negative  - 7/25 Blood cultures drawn for unexplained hypotension   - NGTD    Heme:     #Acute blood loss anemia  #Acute blood loss thrombocytopenia  #History of DVT and PE   #Antiphospholipid antibody syndrome  #History of iron deficiency anemia  - Monitor for s/sx of bleeding  - Trend CBC and coags  - Hgb goal >8   Hgb stable 8s, no signs or symptoms of bleeding  - Plt goal > 20  - Nose bleed, lost 2 units of blood. Transfused for hgb 6.8, 2U received. Recheck ordered-9.0, stable this AM.   - Melena reported-3 stools over night, see GI above  - Continue to dose coumadin, hold heparin gtt as INR trending up and recent bleed requiring transfusion    Rheumatology:  #SLE  - Chronic: symptoms include arthritis, photosensitivity, fatigue, and skin manifestations  - PTA meds: hydroxychloroquine 200mg, resumed 7/16  - Rheumatology consulted and following. Follow dsDNA and complement levels(7/18)    -Will need cellcept restarted when appropriate.    Prophylaxis:    - DVT: SCD + high intensity heparin-held for bleeding + warfarin   - PPI    Disposition:  - Transfer to  on 7/28     Discussed with Dr. Noah Gomez PARAMESH  Cardiothoracic Surgery  p: [phone]    ----------------------------------------------------------------    Interval Events:  Continues to complain of SOB, better today. Stools now brown in color. No abdominal pain. No N/V. Pain controlled.     OBJECTIVE:   1. VITAL SIGNS:   Temp:  [97.4  F (36.3  C)-97.7  F (36.5  C)] 97.6  F (36.4  C)  Pulse:  [105-115] 110  Resp:  [18-24] 20  BP:  ()/(43-94) 115/81  SpO2:  [91 %-100 %] 91 %  Resp: 20      2. INTAKE/ OUTPUT:   I/O last 3 completed shifts:  In: 1465 [P.O.:120; NG/GT:295]  Out: 3400 [Urine:3400]    3. PHYSICAL EXAMINATION:   General: sitting in chair, comfortable  Neuro: follow commands, intermittent delirium cleared   Resp: RRR on RA  CV: tachycardic   Abdomen: Soft, mildly distended, Non-tender  Incisions: c/d/i, no erythema   Extremities: warm and well perfused  LVAD Flow 3.6-4.4, PI 4.1-6.1, Speed 5300, Power 3.7-3.9    4. INVESTIGATIONS:   Arterial Blood Gases   Recent Labs   Lab 07/25/22  2109 07/25/22  0914   PH 7.50* 7.50*   PCO2 46* 46*   PO2 140* 91   HCO3 36* 35*     Complete Blood Count   Recent Labs   Lab 08/01/22  0742 07/31/22  2340 07/31/22  0712 07/30/22  2330 07/30/22  1009 07/29/22  0525   WBC 10.6  --  10.0  --  11.7* 11.3*   HGB 9.1*  9.1* 9.1* 9.2* 9.0* 6.8* 8.4*     --  335  --  439 366     Basic Metabolic Panel  Recent Labs   Lab 08/01/22  0743 08/01/22  0742 08/01/22  0654 08/01/22  0500 07/31/22  0802 07/31/22  0712 07/30/22  1149 07/30/22  1009 07/29/22  0723 07/29/22  0525   NA  --  131*  --   --   --  131*  --  133*  --  135*   POTASSIUM  --  4.1  --   --   --  4.1  --  4.3  --  4.3   CHLORIDE  --  93*  --   --   --  93*  --  94*  --  92*   CO2  --  28  --   --   --  28  --  29  --  33*   BUN  --  21.6  --   --   --  25.0*  --  20.7  --  16.9   CR  --  0.67  --   --   --  0.67  --  0.68  --  0.58*   * 148* 163* 108*   < > 144*   < > 142*   < > 115*    < > = values in this interval not displayed.     Liver Function Tests  Recent Labs   Lab 08/01/22  0742 07/31/22  0712 07/30/22  1009 07/29/22  0525   AST 33 32 34 34   ALT 14 14 11 13   ALKPHOS 205* 221* 195* 230*   BILITOTAL 0.4 0.5 0.5 0.5   ALBUMIN 3.0* 2.9* 2.8* 2.9*   INR 1.87* 1.62* 1.58* 1.37*     Pancreatic Enzymes  No lab results found in last 7 days.  Coagulation Profile  Recent Labs   Lab 08/01/22 0742 07/31/22  0712 07/30/22  1009  07/29/22  0525   INR 1.87* 1.62* 1.58* 1.37*         5. RADIOLOGY:   Recent Results (from the past 24 hour(s))   XR Abdomen Port 1 View    Narrative    EXAMINATION:  XR ABDOMEN PORT 1 VIEW 8/1/2022 4:37 AM     COMPARISON: 7/30/2022    HISTORY: NG tube withdrawn several cm by patient. Replaced by nursing,  evaluate positioning    TECHNIQUE: Frontal view of the abdomen.    FINDINGS: Feeding tube tip projects over the jejunum. Stable IVC  filter. Stable LVAD, pacemaker. Normal bowel gas pattern. Portion of a  tooth with crown project over the right lower quadrant. Cardiomegaly.  Bilateral basilar atelectasis and likely small pleural effusions.      Impression    IMPRESSION:   1. Feeding tube tip projects over the jejunum.  2. Portion of a tooth with a crown projects over the right lower  quadrant.  2. No obstructive bowel gas pattern.    I have personally reviewed the examination and initial interpretation  and I agree with the findings.    LAI HERNADEZ MD         SYSTEM ID:  [id number]       =========================================

## 2022-08-01 NOTE — PROGRESS NOTES
Calorie Count  Intake recorded for: 7/31  Total Kcals: 0 Total Protein: 0g  Kcals from Hospital Food: 0   Protein: 0g  Kcals from Outside Food (average):0 Protein: 0g  # Meals Ordered from Kitchen: 2 meals  # Meals Recorded: No food intake recorded  # Supplements Recorded: No food intake recorded

## 2022-08-01 NOTE — PLAN OF CARE
Goal Outcome Evaluation:    Plan of Care Reviewed With: patient     Overall Patient Progress: no change    Outcome Evaluation: TFs as primary source of nutrition. Continue to send Ensure supplements BID during day + HS snack as ordered with encouraged PO intake    Luis Miller RDN, LD, CNSC  [address] RD pager: [phone] Phone: *[phone]

## 2022-08-01 NOTE — PROGRESS NOTES
CLINICAL NUTRITION SERVICES - REASSESSMENT NOTE     Nutrition Prescription    RECOMMENDATIONS FOR MDs/PROVIDERS TO ORDER:  Recommend continuing cycled TFs as presently ordered for primary source of nutrition until mentation and intake improves. Will hold off on restarting virgilio cts for now.     Malnutrition Status:    Severe malnutrition in the context of acute on chronic illness     Recommendations already ordered by Registered Dietitian (RD):  - Continue cycled TFs to meet nutrition needs    - Continue to send Ensure BID and protein bar in evening to optimize PO given no signs of supplement/snacks collecting in room, but please alert RD to modify order if pt not taking. Continue current flavors, likes chocolate.     - Not appropriate for room service with assist at this time; nursing staff helping to order meals at present pending intake.    Future/Additional Recommendations:  - Restart virgilio cts once mentation starts to clear, as interest in food increases  - Monitor weight trends, labs, stooling trends, skin integrity     EVALUATION OF THE PROGRESS TOWARD GOALS   Diet: Regular  Supplements: Ensure Enlive, BID; Special K protein bar at HS    PO Intake: Typically 25% intake, per I/O. No PO intake yet today, per sitter. Lethargic and not able to participate much in interview. No signs of supplements collecting in room so will continue as ordered for now, but consider decreasing in future as it is likely pt may not be taking. He did say he liked the chocolate flavored supplements during the visit today.  Had virgilio cts in place 7/29-7/31:   7/29       Total Kcals: 429       Total Protein: 17g  7/30       Total Kcals: 141       Total Protein: 3g  7/31       Total Kcals: 0           Total Protein: 0g  *= Avg intake of 190 Kcals, 7 gm protein per day. Meets <25% of est needs.    Nutrition Support:   -Dosing weight: 63 kg (actual 7/8)    -Regimen:   7/11-7/29: Osmolite 1.5 Virgilio @ goal of 50ml/hr (1200ml/day) + 1 pkt Prosource  "TID will provide: 1920 kcals (30 kcal/kg), 108 g PRO (1.7 g/kg), 914 ml free H20, 244 g CHO, and 0 g fiber daily per new dosing wt of 63 kg.     7/29-Present Osmolite 1.5 Virgilio @ 75 ml/hr x 16 hours (1200ml/day) + 1 pkt Prosource TID will provide: 1920 kcals (30 kcal/kg), 108 g PRO (1.7 g/kg), 914 ml free H20, 244 g CHO, and 0 g fiber daily per new dosing wt of 63 kg.    -FWF 30 mL Q4hrs (180 mL/day) + 914 mL from TF for total free water provision of 1094 mL/day  -EN access via NJT    EN Intake: Average 7-day TF intakes: 1136 mL formula, 3 packets Prosource =  1824 Kcals (29 Kcal/kg), and 105 g pro (1.7 g/kg). This is meeting 100% of low-end est Kcal needs, and 100% of low-end est protein needs.     NEW FINDINGS   General:    Transferred from ICU to IMC on 7/29. Transitioned to cycled TFs 7/29 with plan to monitor virgilio cts/ability to further wean TFs in future. Minimal PO intake.     GI:    Frequent BMs recorded to I/O; 4-6 BMs per day (loose, brown, incontinent)    NJT pulled out several cm per note this AM. Re-advanced and confirmed ok position for use per XR/MD assessment.     Weights:    Weight up 0.4 kg since admission; weight trends likely confounded by fluid status. Lowest/driest wt this admission of 63 kg on 7/8 per standing scale; therefore, current dosing wt for est nutrition needs remains appropriate.     Labs:    Na 131 (L)    Phos 4.6 (slightly elevated but prev WNL)    BG trends acceptable for acute care setting      Medications:    Nutrisource fiber ,1 pkt TID (9 gm soluble fiber)    Lipitor    Bumex    High sliding scale insulin every 4 hours    Lantus in AM    Prosource, 1 pkt TID     Warfarin    Skin:    WOCN assessment 7/27 for temporal wound; status evolving     FT bridle removed 7/30 d/t epistaxis, concern for placement through septal perforation. Recs from otolaryngology provider to ensure bridle placed \"way behind the septum\" if ongoing NJT necessary.     MALNUTRITION  % Intake: No decreased " intake noted  % Weight Loss: Weight loss does not meet criteria (likely confounded by fluid status)  Subcutaneous Fat Loss: Facial region:  Moderate, Upper arm:  Moderate and Thoracic/intercostal:  Moderate  Muscle Loss: Facial & jaw region:  Moderate, Thoracic region (clavicle, acromium bone, deltoid, trapezius, pectoral):  Moderate, Upper arm (bicep, tricep):  Moderate and Dorsal hand:  Moderate  Fluid Accumulation/Edema: Does not meet criteria (trace)  Malnutrition Diagnosis: Severe malnutrition in the context of acute on chronic illness     Previous Goals   Total avg nutritional intake to meet a minimum of 25 kcal/kg and 1.5g PRO/kg daily (per NEW dosing wt 63 kg).  Evaluation: Met    Previous Nutrition Diagnosis  Inadequate oral intake related to dysphagia, AMS at times as evidenced by pt eating minimally and continues to be reliant on EN support for full nutrition needs at this time.   Evaluation: No change    CURRENT NUTRITION DIAGNOSIS  Inadequate oral intake related to dysphagia, AMS at times as evidenced by pt eating minimally and continues to be reliant on EN support for full nutrition needs at this time.     INTERVENTIONS  Implementation  Enteral Nutrition - Continue  Medical food supplement therapy - Continue    Goals  Total avg nutritional intake to meet a minimum of 25 kcal/kg and 1.5 g PRO/kg daily (per dosing wt 63 kg).    Monitoring/Evaluation  Progress toward goals will be monitored and evaluated per protocol.    Luis Miller RDN, LD, [facility]  6B RD pager: [phone]   6B work-room RD phone: *[phone]    Weekend/Holiday RD pager [phone]

## 2022-08-01 NOTE — PLAN OF CARE
Neuro: A&Ox3-4; intermittently anxious and agitated during day. Primary team aware. Following commands. PRN Atarax given 1x.  Noted delirium at night. Fall on 7/29 at night-sitter remains at bedside  Cardiac: ST. VSS. Declinec chest pain. LVAD hum noted.    Respiratory: Sating above 90% on RA.  Lung sounds coarse to clear. Right lung w/fluid noted.  Chest x-ray completed today.  IR consulted. Bumex increased to 3x daily- 3mg IV.  GI/: Good urine output via marcelo. Several loose/watery stools noted t/o shift.  Stools now brown in color.  Intermittent nausea-increases w/movement. Zofran given 1x.  Diet/appetite: Tolerating regular diet. Eating poorly. Cycle TF started at 1800 @ 75 ml/hr with 30 ml water flush Q 4 hours. Continue to encourage oral intake.  Activity:  Assist of 1-2x up to chair during shift.  PT/OT following.  Pain: At acceptable level on current regimen. Scheduled Tylenol and Oxycodone given.  Pt complained of pain to shoulder/chest pain. PRN oxycodone given 1x.  Skin: No new deficits noted.  Redden coccyx.  Dressings C/D/I  LDA's: 3x Right PICC lumen, LVAD, NJ at 110 cm, marcelo  Hgb improved to 9.2     Plan: Continue with POC. Notify primary team with changes.

## 2022-08-01 NOTE — PROGRESS NOTES
GASTROENTEROLOGY PROGRESS NOTE    Date of Admission: 6/17/2022  Reason for Admission: LVAD placement    ASSESSMENT:  61 year old male with history of hypertension, hyperlipidemia, CAD s/p PCI, low EF s/p CRRT, severe mitral valve regurgitation, antiphospholipid antibody syndrome on chronic warfarin, SLE who was initially transferred to [facility] in May 2022 from [place] as a transfer for management of cardiogenic shock. He was optimized at [facility], discharged for a brief period then readmitted. He ultimately underwent LVAD placement (HeartMate 3) on 7/8 and has had an eventful post-procedural course. The GI Luminal Service was consulted regarding for suspected upper GI bleeding and melena.    # Melena  # Epistaxis  Recent significant epistaxis episode resulting in loss of copious amounts of blood causing anemia. Patient endorsed swallowing blood during the episode. Melena likely due to swallowed blood products. Other possible etiologies considered include feeding tube trauma, suction trauma, gastropathy/gastritis versus PUD versus esophagitis. Given resolution of melena and no further epistaxis, likely melena was due to swallowed blood during episode of significant epistaxis.    Patient recovering from eventful postoperative course, on Warfarin anticoagulation, and is deconditioned with poor general health condition, which puts him at risk for procedure complications. Risks of upper endoscopy outweigh the benefits at this time. If hemodynamically significant bleeding occurs the risks/benefits of GI endoscopy would be reassessed at that time.     RECOMMENDATIONS:  -- Maintain 2 large bore IVs, 18G or larger.  -- Okay to transition from IV PPI BID to once daily PPI PO in the setting of critical illness. At discharge can transition back to home H2 blocker (Famotidine 20 mg PO BID).   -- Continue Supportive Cares  - Adequate volume resuscitation with IVF, pRBCs.   - Monitor Hemoglobin closely. Transfuse to keep Hgb > 7  "g/dl from GI standpoint.    - Maintain hemodynamics: MAP >65 mmHg and HR <100.  - Optimize electrolytes.  -- Continue tube feedings.  -- No indication for acute GI intervention at this time. Will consider if there is evidence of hemodynamically significant bleeding; patient will likely need intubation in that setting.  -- Analgesia/Antiemetics per primary team    Outpatient:  -- No outpatient GI clinic follow-up necessary.     COVID status: negative 7/29/2022.     The inpatient gastroenterology service will sign off at this time. Thank you for allowing us to participate in the care of this patient. Please do not hesitate to page the GI service with any questions or concerns.     The patient was discussed and plan agreed upon with GI staff, Dr. Santos.     Yoli Oscar PA-C  Inpatient Gastroenterology Service  [facility]  Text Page  _______________________________________________________________      Subjective: Nursing notes and 24hr events reviewed. Patient seen and examined at 13:20 PM.     S/p Right Chest tube placement.     Patient reports having a bowel movement this morning but cannot remember what color. Per RN documentation, brown bowel movement. No further epistaxis.     Denies nausea, vomiting, GERD, heartburn. No dysphagia or odynophagia. Denies abdominal pain.    NJ placed by radiology 7/11/2022.    ROS:   4 pt ROS negative unless noted in subjective.     Objective:  Blood pressure 115/81, pulse 106, temperature 97.5  F (36.4  C), temperature source Axillary, resp. rate 20, height 1.702 m (5' 7\"), weight 67.8 kg (149 lb 7.6 oz), SpO2 92 %.  General: 61 year old male sitting in a chair next to his bed in NAD. Appears comfortable.  Answers appropriately.    HEENT: Head is AT/NC. Sclera anicteric. No conjunctival injection. NJ Tube present.  Neck: No masses.  Lungs: No increased work of breathing. Speaking in full sentences.   Heart: Tachycardic. LVAD hum. Peripheral " perfusion intact.  Abdomen: Soft, non-tender, non-distended.  BS +. No rebound or peritoneal signs  Extremities: WWP, no pedal edema.  Musculoskeletal: No gross deformity.  Skin: No jaundice or rash  Neurologic: Grossly non-focal.  CN 2-12 grossly intact.   Mental status/Psych: A&O. Asks/answers questions appropriately     Date 08/01/22 0700 - 08/02/22 0659   Shift 2712-0981 2950-6699 7461-5048 24 Hour Total   INTAKE   P.O. 100   100   NG/   150   Enteral 300   300   Shift Total(mL/kg) 550(8.11)   550(8.11)   OUTPUT   Urine 400   400   Shift Total(mL/kg) 400(5.9)   400(5.9)   Weight (kg) 67.8 67.8 67.8 67.8         PROCEDURES:  5/23/2022 - Colonoscopy - Dr. Kaveh Meneses  Impression:            Colonoscopy performed for screening in a potential                          heart transplant candidate. He reports no FH of colon                          cancer.                          No polyps were seen.     LABS:  BMP  Recent Labs   Lab 08/01/22  0743 08/01/22  0742 08/01/22  0654 08/01/22  0500 07/31/22  0802 07/31/22  0712 07/30/22  1149 07/30/22  1009 07/29/22  0723 07/29/22  0525   NA  --  131*  --   --   --  131*  --  133*  --  135*   POTASSIUM  --  4.1  --   --   --  4.1  --  4.3  --  4.3   CHLORIDE  --  93*  --   --   --  93*  --  94*  --  92*   ELDA  --  9.0  --   --   --  8.9  --  8.6*  --  8.8   CO2  --  28  --   --   --  28  --  29  --  33*   BUN  --  21.6  --   --   --  25.0*  --  20.7  --  16.9   CR  --  0.67  --   --   --  0.67  --  0.68  --  0.58*   * 148* 163* 108*   < > 144*   < > 142*   < > 115*    < > = values in this interval not displayed.     CBC  Recent Labs   Lab 08/01/22  0742 07/31/22  2340 07/31/22  0712 07/30/22  2330 07/30/22  1009 07/29/22  0525   WBC 10.6  --  10.0  --  11.7* 11.3*   RBC 3.09*  --  3.16*  --  2.36* 2.90*   HGB 9.1*  9.1*   < > 9.2*   < > 6.8* 8.4*   HCT 29.2*  --  29.4*  --  22.5* 27.6*   MCV 95  --  93  --  95 95   MCH 29.4  --  29.1  --  28.8 29.0   MCHC  31.2*  --  31.3*  --  30.2* 30.4*   RDW 18.1*  --  17.9*  --  18.7* 18.6*     --  335  --  439 366    < > = values in this interval not displayed.     INR  Recent Labs   Lab 08/01/22  0742 07/31/22  0712 07/30/22  1009 07/29/22  0525   INR 1.87* 1.62* 1.58* 1.37*     LFTs  Recent Labs   Lab 08/01/22  0742 07/31/22  0712 07/30/22  1009 07/29/22  0525   ALKPHOS 205* 221* 195* 230*   AST 33 32 34 34   ALT 14 14 11 13   BILITOTAL 0.4 0.5 0.5 0.5   PROTTOTAL 6.7 6.5 6.4 6.6   ALBUMIN 3.0* 2.9* 2.8* 2.9*      PANCNo lab results found in last 7 days.      IMAGING:    XR ABDOMEN PORT 1 VIEW 8/1/2022 4:37 AM  FINDINGS: Feeding tube tip projects over the jejunum. Stable IVC  filter. Stable LVAD, pacemaker. Normal bowel gas pattern. Portion of a  tooth with crown project over the right lower quadrant. Cardiomegaly.  Bilateral basilar atelectasis and likely small pleural effusions.                                                          IMPRESSION:   1. Feeding tube tip projects over the jejunum.  2. Portion of a tooth with a crown projects over the right lower  quadrant.  2. No obstructive bowel gas pattern.

## 2022-08-02 ENCOUNTER — APPOINTMENT (OUTPATIENT)
Dept: OCCUPATIONAL THERAPY | Facility: CLINIC | Age: 62
DRG: 001 | End: 2022-08-02
Attending: INTERNAL MEDICINE
Payer: COMMERCIAL

## 2022-08-02 ENCOUNTER — APPOINTMENT (OUTPATIENT)
Dept: SPEECH THERAPY | Facility: CLINIC | Age: 62
DRG: 001 | End: 2022-08-02
Attending: INTERNAL MEDICINE
Payer: COMMERCIAL

## 2022-08-02 ENCOUNTER — APPOINTMENT (OUTPATIENT)
Dept: GENERAL RADIOLOGY | Facility: CLINIC | Age: 62
DRG: 001 | End: 2022-08-02
Attending: PHYSICIAN ASSISTANT
Payer: COMMERCIAL

## 2022-08-02 LAB
ALBUMIN SERPL BCG-MCNC: 2.9 G/DL (ref 3.5–5.2)
ALP SERPL-CCNC: 215 U/L (ref 40–129)
ALT SERPL W P-5'-P-CCNC: 13 U/L (ref 10–50)
ANION GAP SERPL CALCULATED.3IONS-SCNC: 8 MMOL/L (ref 7–15)
AST SERPL W P-5'-P-CCNC: 30 U/L (ref 10–50)
BACTERIA BLD CULT: NO GROWTH
BILIRUB SERPL-MCNC: 0.4 MG/DL
BUN SERPL-MCNC: 21.2 MG/DL (ref 8–23)
CA-I BLD-MCNC: 4.4 MG/DL (ref 4.4–5.2)
CALCIUM SERPL-MCNC: 8.8 MG/DL (ref 8.8–10.2)
CHLORIDE SERPL-SCNC: 94 MMOL/L (ref 98–107)
CREAT SERPL-MCNC: 0.68 MG/DL (ref 0.67–1.17)
DEPRECATED HCO3 PLAS-SCNC: 30 MMOL/L (ref 22–29)
ERYTHROCYTE [DISTWIDTH] IN BLOOD BY AUTOMATED COUNT: 18 % (ref 10–15)
GFR SERPL CREATININE-BSD FRML MDRD: >90 ML/MIN/1.73M2
GLUCOSE BLDC GLUCOMTR-MCNC: 114 MG/DL (ref 70–99)
GLUCOSE BLDC GLUCOMTR-MCNC: 118 MG/DL (ref 70–99)
GLUCOSE BLDC GLUCOMTR-MCNC: 169 MG/DL (ref 70–99)
GLUCOSE BLDC GLUCOMTR-MCNC: 170 MG/DL (ref 70–99)
GLUCOSE BLDC GLUCOMTR-MCNC: 172 MG/DL (ref 70–99)
GLUCOSE SERPL-MCNC: 144 MG/DL (ref 70–99)
HCT VFR BLD AUTO: 29.4 % (ref 40–53)
HGB BLD-MCNC: 9 G/DL (ref 13.3–17.7)
HGB BLD-MCNC: 9 G/DL (ref 13.3–17.7)
HGB BLD-MCNC: 9.3 G/DL (ref 13.3–17.7)
HGB BLD-MCNC: 9.7 G/DL (ref 13.3–17.7)
INR PPP: 2.08 (ref 0.85–1.15)
LACTATE SERPL-SCNC: 1.1 MMOL/L (ref 0.7–2)
MAGNESIUM SERPL-MCNC: 2.1 MG/DL (ref 1.7–2.3)
MCH RBC QN AUTO: 28.9 PG (ref 26.5–33)
MCHC RBC AUTO-ENTMCNC: 30.6 G/DL (ref 31.5–36.5)
MCV RBC AUTO: 95 FL (ref 78–100)
PHOSPHATE SERPL-MCNC: 4.7 MG/DL (ref 2.5–4.5)
PLATELET # BLD AUTO: 289 10E3/UL (ref 150–450)
POTASSIUM SERPL-SCNC: 4 MMOL/L (ref 3.4–5.3)
PROT SERPL-MCNC: 6.6 G/DL (ref 6.4–8.3)
RBC # BLD AUTO: 3.11 10E6/UL (ref 4.4–5.9)
SODIUM SERPL-SCNC: 132 MMOL/L (ref 136–145)
WBC # BLD AUTO: 9 10E3/UL (ref 4–11)

## 2022-08-02 PROCEDURE — 83605 ASSAY OF LACTIC ACID: CPT | Performed by: SURGERY

## 2022-08-02 PROCEDURE — 71045 X-RAY EXAM CHEST 1 VIEW: CPT | Mod: 26 | Performed by: STUDENT IN AN ORGANIZED HEALTH CARE EDUCATION/TRAINING PROGRAM

## 2022-08-02 PROCEDURE — 250N000013 HC RX MED GY IP 250 OP 250 PS 637: Performed by: STUDENT IN AN ORGANIZED HEALTH CARE EDUCATION/TRAINING PROGRAM

## 2022-08-02 PROCEDURE — 36415 COLL VENOUS BLD VENIPUNCTURE: CPT | Performed by: PHYSICIAN ASSISTANT

## 2022-08-02 PROCEDURE — 97110 THERAPEUTIC EXERCISES: CPT | Mod: GO

## 2022-08-02 PROCEDURE — 250N000013 HC RX MED GY IP 250 OP 250 PS 637: Performed by: PHYSICIAN ASSISTANT

## 2022-08-02 PROCEDURE — 250N000013 HC RX MED GY IP 250 OP 250 PS 637: Performed by: SURGERY

## 2022-08-02 PROCEDURE — 36592 COLLECT BLOOD FROM PICC: CPT | Performed by: PHYSICIAN ASSISTANT

## 2022-08-02 PROCEDURE — 85027 COMPLETE CBC AUTOMATED: CPT | Performed by: SURGERY

## 2022-08-02 PROCEDURE — 87077 CULTURE AEROBIC IDENTIFY: CPT | Performed by: PHYSICIAN ASSISTANT

## 2022-08-02 PROCEDURE — 36600 WITHDRAWAL OF ARTERIAL BLOOD: CPT

## 2022-08-02 PROCEDURE — G0463 HOSPITAL OUTPT CLINIC VISIT: HCPCS

## 2022-08-02 PROCEDURE — 250N000011 HC RX IP 250 OP 636: Performed by: PHYSICIAN ASSISTANT

## 2022-08-02 PROCEDURE — 87149 DNA/RNA DIRECT PROBE: CPT | Performed by: SURGERY

## 2022-08-02 PROCEDURE — 250N000011 HC RX IP 250 OP 636: Performed by: STUDENT IN AN ORGANIZED HEALTH CARE EDUCATION/TRAINING PROGRAM

## 2022-08-02 PROCEDURE — 99233 SBSQ HOSP IP/OBS HIGH 50: CPT | Mod: GC | Performed by: INTERNAL MEDICINE

## 2022-08-02 PROCEDURE — 80053 COMPREHEN METABOLIC PANEL: CPT | Performed by: SURGERY

## 2022-08-02 PROCEDURE — 87077 CULTURE AEROBIC IDENTIFY: CPT | Performed by: SURGERY

## 2022-08-02 PROCEDURE — 82330 ASSAY OF CALCIUM: CPT | Performed by: SURGERY

## 2022-08-02 PROCEDURE — 71045 X-RAY EXAM CHEST 1 VIEW: CPT

## 2022-08-02 PROCEDURE — 83735 ASSAY OF MAGNESIUM: CPT | Performed by: SURGERY

## 2022-08-02 PROCEDURE — 36592 COLLECT BLOOD FROM PICC: CPT | Performed by: SURGERY

## 2022-08-02 PROCEDURE — 99221 1ST HOSP IP/OBS SF/LOW 40: CPT | Performed by: PSYCHIATRY & NEUROLOGY

## 2022-08-02 PROCEDURE — 97535 SELF CARE MNGMENT TRAINING: CPT | Mod: GO

## 2022-08-02 PROCEDURE — 120N000003 HC R&B IMCU UMMC

## 2022-08-02 PROCEDURE — C9113 INJ PANTOPRAZOLE SODIUM, VIA: HCPCS | Performed by: PHYSICIAN ASSISTANT

## 2022-08-02 PROCEDURE — 85018 HEMOGLOBIN: CPT | Performed by: PHYSICIAN ASSISTANT

## 2022-08-02 PROCEDURE — 84100 ASSAY OF PHOSPHORUS: CPT | Performed by: SURGERY

## 2022-08-02 PROCEDURE — 250N000011 HC RX IP 250 OP 636: Performed by: SURGERY

## 2022-08-02 PROCEDURE — 250N000013 HC RX MED GY IP 250 OP 250 PS 637: Performed by: INTERNAL MEDICINE

## 2022-08-02 PROCEDURE — 87149 DNA/RNA DIRECT PROBE: CPT | Performed by: PHYSICIAN ASSISTANT

## 2022-08-02 PROCEDURE — 250N000013 HC RX MED GY IP 250 OP 250 PS 637: Performed by: THORACIC SURGERY (CARDIOTHORACIC VASCULAR SURGERY)

## 2022-08-02 PROCEDURE — 85610 PROTHROMBIN TIME: CPT | Performed by: SURGERY

## 2022-08-02 PROCEDURE — 92526 ORAL FUNCTION THERAPY: CPT | Mod: GN | Performed by: SPEECH-LANGUAGE PATHOLOGIST

## 2022-08-02 RX ORDER — BUMETANIDE 0.25 MG/ML
3 INJECTION INTRAMUSCULAR; INTRAVENOUS
Status: DISCONTINUED | OUTPATIENT
Start: 2022-08-02 | End: 2022-08-03

## 2022-08-02 RX ORDER — WARFARIN SODIUM 6 MG/1
12 TABLET ORAL
Status: COMPLETED | OUTPATIENT
Start: 2022-08-02 | End: 2022-08-02

## 2022-08-02 RX ORDER — QUETIAPINE FUMARATE 50 MG/1
50 TABLET, FILM COATED ORAL
Status: DISCONTINUED | OUTPATIENT
Start: 2022-08-02 | End: 2022-08-21 | Stop reason: HOSPADM

## 2022-08-02 RX ORDER — POTASSIUM CHLORIDE 750 MG/1
40 TABLET, EXTENDED RELEASE ORAL 2 TIMES DAILY
Status: DISCONTINUED | OUTPATIENT
Start: 2022-08-02 | End: 2022-08-03

## 2022-08-02 RX ORDER — QUETIAPINE FUMARATE 25 MG/1
25 TABLET, FILM COATED ORAL EVERY 8 HOURS
Status: DISCONTINUED | OUTPATIENT
Start: 2022-08-02 | End: 2022-08-03

## 2022-08-02 RX ORDER — DIPHENHYDRAMINE HCL 25 MG
25 CAPSULE ORAL
Status: DISCONTINUED | OUTPATIENT
Start: 2022-08-02 | End: 2022-08-07

## 2022-08-02 RX ORDER — QUETIAPINE FUMARATE 50 MG/1
50 TABLET, FILM COATED ORAL AT BEDTIME
Status: DISCONTINUED | OUTPATIENT
Start: 2022-08-02 | End: 2022-08-03

## 2022-08-02 RX ORDER — HYDROMORPHONE HCL IN WATER/PF 6 MG/30 ML
.2-.4 PATIENT CONTROLLED ANALGESIA SYRINGE INTRAVENOUS
Status: DISCONTINUED | OUTPATIENT
Start: 2022-08-02 | End: 2022-08-07

## 2022-08-02 RX ORDER — LANOLIN ALCOHOL/MO/W.PET/CERES
3 CREAM (GRAM) TOPICAL
Status: DISCONTINUED | OUTPATIENT
Start: 2022-08-02 | End: 2022-08-03

## 2022-08-02 RX ADMIN — OXYCODONE HYDROCHLORIDE 5 MG: 5 TABLET ORAL at 22:30

## 2022-08-02 RX ADMIN — PANTOPRAZOLE SODIUM 40 MG: 40 INJECTION, POWDER, FOR SOLUTION INTRAVENOUS at 19:30

## 2022-08-02 RX ADMIN — INSULIN ASPART 2 UNITS: 100 INJECTION, SOLUTION INTRAVENOUS; SUBCUTANEOUS at 04:36

## 2022-08-02 RX ADMIN — ONDANSETRON 4 MG: 2 INJECTION INTRAMUSCULAR; INTRAVENOUS at 13:57

## 2022-08-02 RX ADMIN — Medication 1 PACKET: at 13:24

## 2022-08-02 RX ADMIN — OXYCODONE HYDROCHLORIDE 5 MG: 5 TABLET ORAL at 01:48

## 2022-08-02 RX ADMIN — CAPTOPRIL 12.5 MG: 12.5 TABLET ORAL at 13:24

## 2022-08-02 RX ADMIN — ONDANSETRON 4 MG: 2 INJECTION INTRAMUSCULAR; INTRAVENOUS at 08:08

## 2022-08-02 RX ADMIN — ACETAMINOPHEN 975 MG: 325 TABLET, FILM COATED ORAL at 07:46

## 2022-08-02 RX ADMIN — Medication 1 PACKET: at 07:57

## 2022-08-02 RX ADMIN — QUETIAPINE FUMARATE 50 MG: 50 TABLET ORAL at 22:21

## 2022-08-02 RX ADMIN — BUMETANIDE 3 MG: 0.25 INJECTION, SOLUTION INTRAMUSCULAR; INTRAVENOUS at 11:25

## 2022-08-02 RX ADMIN — ATORVASTATIN CALCIUM 40 MG: 40 TABLET, FILM COATED ORAL at 19:30

## 2022-08-02 RX ADMIN — BUMETANIDE 3 MG: 0.25 INJECTION, SOLUTION INTRAMUSCULAR; INTRAVENOUS at 07:42

## 2022-08-02 RX ADMIN — FLUTICASONE PROPIONATE AND SALMETEROL XINAFOATE 2 PUFF: 115; 21 AEROSOL, METERED RESPIRATORY (INHALATION) at 07:50

## 2022-08-02 RX ADMIN — OLANZAPINE 5 MG: 5 TABLET, FILM COATED ORAL at 19:31

## 2022-08-02 RX ADMIN — INSULIN ASPART 2 UNITS: 100 INJECTION, SOLUTION INTRAVENOUS; SUBCUTANEOUS at 07:49

## 2022-08-02 RX ADMIN — HYDROXYZINE HYDROCHLORIDE 50 MG: 25 TABLET ORAL at 15:11

## 2022-08-02 RX ADMIN — QUETIAPINE FUMARATE 25 MG: 25 TABLET ORAL at 17:25

## 2022-08-02 RX ADMIN — BUMETANIDE 3 MG: 0.25 INJECTION, SOLUTION INTRAMUSCULAR; INTRAVENOUS at 17:26

## 2022-08-02 RX ADMIN — ASPIRIN 81 MG CHEWABLE TABLET 81 MG: 81 TABLET CHEWABLE at 07:46

## 2022-08-02 RX ADMIN — ACETAMINOPHEN 975 MG: 325 TABLET, FILM COATED ORAL at 15:11

## 2022-08-02 RX ADMIN — CAPTOPRIL 12.5 MG: 12.5 TABLET ORAL at 21:07

## 2022-08-02 RX ADMIN — OLANZAPINE 5 MG: 5 TABLET, FILM COATED ORAL at 07:46

## 2022-08-02 RX ADMIN — OXYCODONE HYDROCHLORIDE 5 MG: 5 TABLET ORAL at 09:28

## 2022-08-02 RX ADMIN — FLUTICASONE PROPIONATE AND SALMETEROL XINAFOATE 2 PUFF: 115; 21 AEROSOL, METERED RESPIRATORY (INHALATION) at 19:30

## 2022-08-02 RX ADMIN — HYDROXYZINE HYDROCHLORIDE 25 MG: 25 TABLET ORAL at 07:47

## 2022-08-02 RX ADMIN — DIGOXIN 125 MCG: 125 TABLET ORAL at 07:46

## 2022-08-02 RX ADMIN — OXYCODONE HYDROCHLORIDE 5 MG: 5 TABLET ORAL at 13:24

## 2022-08-02 RX ADMIN — HYDROXYZINE HYDROCHLORIDE 50 MG: 25 TABLET ORAL at 04:34

## 2022-08-02 RX ADMIN — Medication 1 PACKET: at 19:32

## 2022-08-02 RX ADMIN — OXYCODONE HYDROCHLORIDE 5 MG: 5 TABLET ORAL at 06:26

## 2022-08-02 RX ADMIN — WARFARIN SODIUM 12 MG: 6 TABLET ORAL at 17:25

## 2022-08-02 RX ADMIN — HYDROXYCHLOROQUINE SULFATE 200 MG: 200 TABLET, FILM COATED ORAL at 19:30

## 2022-08-02 RX ADMIN — HYDROXYCHLOROQUINE SULFATE 200 MG: 200 TABLET, FILM COATED ORAL at 07:46

## 2022-08-02 RX ADMIN — MULTIVITAMIN 15 ML: LIQUID ORAL at 07:50

## 2022-08-02 RX ADMIN — ACETAMINOPHEN 975 MG: 325 TABLET, FILM COATED ORAL at 01:48

## 2022-08-02 RX ADMIN — PANTOPRAZOLE SODIUM 40 MG: 40 INJECTION, POWDER, FOR SOLUTION INTRAVENOUS at 07:48

## 2022-08-02 RX ADMIN — QUETIAPINE FUMARATE 25 MG: 25 TABLET ORAL at 23:53

## 2022-08-02 RX ADMIN — SERTRALINE HYDROCHLORIDE 50 MG: 50 TABLET ORAL at 17:26

## 2022-08-02 RX ADMIN — Medication 3 MG: at 23:52

## 2022-08-02 RX ADMIN — ACETAMINOPHEN 975 MG: 325 TABLET, FILM COATED ORAL at 23:52

## 2022-08-02 RX ADMIN — CAPTOPRIL 12.5 MG: 12.5 TABLET ORAL at 07:46

## 2022-08-02 RX ADMIN — INSULIN ASPART 2 UNITS: 100 INJECTION, SOLUTION INTRAVENOUS; SUBCUTANEOUS at 19:31

## 2022-08-02 RX ADMIN — LIDOCAINE PATCH 4% 1 PATCH: 40 PATCH TOPICAL at 07:41

## 2022-08-02 RX ADMIN — DULOXETINE 30 MG: 30 CAPSULE, DELAYED RELEASE ORAL at 07:50

## 2022-08-02 RX ADMIN — HYDROMORPHONE HYDROCHLORIDE 0.2 MG: 0.2 INJECTION, SOLUTION INTRAMUSCULAR; INTRAVENOUS; SUBCUTANEOUS at 02:19

## 2022-08-02 ASSESSMENT — ACTIVITIES OF DAILY LIVING (ADL)
ADLS_ACUITY_SCORE: 36
ADLS_ACUITY_SCORE: 32
ADLS_ACUITY_SCORE: 36

## 2022-08-02 NOTE — PLAN OF CARE
Neuro: A&Ox2. Knows self and in a hospital. More confused today, team aware. Psych saw patient and switched some medications around. Agitated/restless at times. Sitter remains at bedside.   Cardiac: HM 3. Values WDL. ST. Rates 100-110s. Doppler MAPs 80s-90s. Afebrile.             Respiratory: Sating >92% on RA. Dyspnea on exertion, tachypneic.   GI/: Adequate urine output via marcelo. MD states to take marcelo out tomorrow morning once bumex is switched to oral. Get UA after marcelo removal. Loose stools x1.   Diet/appetite: NPO until speech sees patient tomorrow, states its hard to swallow. Zofran given x2 for nausea. Cycled TF running at 75 ml/hour with 30 ml FWF Q4H from 6p-10a.   Activity: Assist of 2 out of bed.  Pain: Persistent pain in shoulder and back. PRN oxycodone given x2.   Skin: No new deficits noted.  LDA's: TL PICC, marcelo, LVAD, NJ, R CT -20 suction.   Plan: Possible CT removal tomorrow. Continue with POC. Notify primary team with changes.

## 2022-08-02 NOTE — PROGRESS NOTES
[facility] Nurse Inpatient Assessment     Consulted for: R) temporal wound    Patient History (according to provider note(s):      Dandy Sands is a 61 year old male with a PMH of CAD s/p PCI to LAD, MI, ICM, acute on chronic heart failure, s/p CRT-D, severe MR, antiphospholipid antibody syndrome on chronic warfarin, SLE, HTN, HLD, recent hospitalization 5/16-5/26 s/p RCA, LAD stenting who underwent RVAD (29F Protek duo RIJ) LVAD placement (HeartMate 3) on 7/8/22 by Dr. Zamora.    Areas Assessed:      Areas visualized during today's visit:  head    Pressure Injury Location: R) temporal area        7/13 7/20 7/27 8/2      Wound type: Pressure Injury     Pressure Injury Stage: Deep Tissue Pressure Injury (DTPI), hospital acquired      This is a Medical Device Related Pressure Injury (MDRPI) due to  RVAD Cannula was pressing against the area and wrapped with coban.  Currently has optifoam in place and tube is pulled away from the area avoiding direct pressure.  Wound history/plan of care:   Per RN tube was directly laying on the area,    Wound base: 100 % superficial scab to both areas      Palpation of the wound bed: normal      Drainage: none     Description of drainage: none     Measurements (length x width x depth, in cm) now two separate areas- 0.8 x 0.8  x  0.1 cm; bleeding and 1 x0.5x0.1 cm with superficial scab      Tunneling N/A     Undermining N/A  Periwound skin: Intact      Color: normal and consistent with surrounding tissue      Temperature: normal   Odor: none  Pain: absent and no grimacing or signs of discomfort, none  Pain intervention prior to dressing change: N/A  Treatment goal: Heal  and Protection  STATUS: evolving and healing  Supplies ordered: supplies stored on unit    Treatment Plan:     R) temporal area wound(s): Every 3 days       Cleanse the area with  NS and pat dry.    Apply No sting film barrier to periwound skin.    Cover wound with Mepilex     Change dressing Q 3 days.      Orders: Reviewed    RECOMMEND PRIMARY TEAM ORDER: None, at this time  Education provided: importance of repositioning, plan of care, wound progress and Off-loading pressure  Discussed plan of care with: Nurse  [facility] nurse follow-up plan: weekly  Notify WOC if wound(s) deteriorate.  Nursing to notify the Provider(s) and re-consult the WOC Nurse if new skin concern.    DATA:     Current support surface: Standard  Low air loss mattress  Containment of urine/stool: Indwelling catheter  BMI: Body mass index is 23.65 kg/m .   Active diet order: Orders Placed This Encounter      Regular Diet Adult         Labs: Recent Labs     Recent Labs     Recent Labs   Lab Test 08/02/22  0455 06/19/22  2157 06/19/22  1522 05/20/22  1715 05/20/22  0516   ALBUMIN 2.9*   < > 2.8*   < > 3.1*   HGB 9.0*  9.0*   < > 8.5*   < > 8.6*   INR 2.08*   < >  --    < > 1.24*   WBC 9.0   < > 5.2   < > 5.5   A1C  --   --   --   --  5.5   CRP  --   --  28.0*  --   --     < > = values in this interval not displayed.       Pressure injury risk assessment:   Sensory Perception: 3-->slightly limited  Moisture: 3-->occasionally moist  Activity: 3-->walks occasionally  Mobility: 3-->slightly limited  Nutrition: 3-->adequate  Friction and Shear: 2-->potential problem  Case Score: 17    Genevieve Mattson RN [facility]   Pager: [phone]  Ph: [phone]

## 2022-08-02 NOTE — CONSULTS
"          Initial Psychiatric Consult   Consult date: August 2, 2022         Reason for Consult, requesting source:    Anxiety and sleep difficulties   Requesting source: Darius Zamora    Labs and imaging reviewed. Discussed with nursing an the team.         HPI:   From recent progress note;  \"Dandy Sands is a 61 year old male with PMH of lupus, antiphospholipid syndrome, HTN, HLD, HFrEF 2/2 ICM who presented with cardiogenic shock c/b multiorgan failure (JOSE, shock liver) requiring mechanical support with IABP, now s/p HM3 LVAD 7/8/22 by Dr. Zamora with intraoperative course c/b RV failure s/p 29F Protek duo via RIJ. Post op course c/b hemopericardium s/p repeat washout with chest left open. Chest closed 7/13/22 and decannulated from RVAD 7/21\"    Related to his prolonged hospitalization and slow progress Dandy reports feeling pretty worn out and discouraged.  He feels depressed at times and also hopeless at times as well but no suicidal ideation or death wish.  He said his breathing was okay until he had to put another chest tube in and now has had some increased anxiety related to his shortness of breath.  He denies any hallucinations but does admit to periods of confusion.  He has a bedside attendant present due to his confusion and tendency to pull at tubes and try to get out of bed.  He is taking Cymbalta 30 mg but they does not recall why he is taking it.  I see that he been getting 50 mg of hydroxyzine up to 4 times a day for anxiety.  He thinks it helps somewhat.  He cannot recall whether the Zyprexa is helpful.  He and his nurse report poor sleep.            Past Psychiatric History:   No prior treatment for depression or anxiety.  No psychotherapy.        Substance Use and History:   History of excessive alcohol treatment with one CD treatment in his 20s. Recently would have 1-2 drinks when playing pool. Quit smoking over 20 years ago.         Past Medical History:   PAST MEDICAL HISTORY: as above "     PAST SURGICAL HISTORY:   Past Surgical History:   Procedure Laterality Date     COLONOSCOPY N/A 05/23/2022    Procedure: COLONOSCOPY;  Surgeon: Parth Meneses MD;  Location: [facility]     CV CORONARY ANGIOGRAM N/A 5/24/2022    Procedure: Coronary Angiogram;  Surgeon: Mike Pope MD;  Location: [facility]     CV CORONARY ANGIOGRAM N/A 5/29/2022    Procedure: Coronary Angiogram;  Surgeon: Austin Bright MD;  Location: [facility]     CV CORONARY LITHOTRIPSY PCI Bilateral 5/24/2022    Procedure: Percutaneous Coronary Intervention - Lithotripsy;  Surgeon: Mike Pope MD;  Location: [facility]     CV INTRA AORTIC BALLOON N/A 6/17/2022    Procedure: Intraprocedure Aortic Balloon Pump Insertion;  Surgeon: Sherman Cerda MD;  Location: [facility]     CV INTRA AORTIC BALLOON Right 7/3/2022    Procedure: Reposition of Subclavian Intraprocedure Aortic Balloon Pump;  Surgeon: Austin Bright MD;  [facility]: [facility]     CV INTRAVASULAR ULTRASOUND N/A 5/24/2022    Procedure: Intravascular Ultrasound;  Surgeon: Mike Pope MD;  [facility]: [facility]     CV PCI ANGIOPLASTY N/A 5/29/2022    Procedure: Percutaneous Transluminal Angioplasty;  Surgeon: Austin Bright MD;  [facility]     CV PCI STENT DRUG ELUTING N/A 5/24/2022    Procedure: Percutaneous Coronary Intervention Stent;  Surgeon: Mike Pope MD;  Location: [facility]     CV RIGHT HEART CATH MEASUREMENTS RECORDED N/A 05/18/2022    Procedure: Right Heart Catheterization;  Surgeon: Justo Stewart MD;  [facility]: [facility]     CV RIGHT HEART CATH MEASUREMENTS RECORDED N/A 5/24/2022    Procedure: Right Heart Catheterization;  Surgeon: Mike Pope MD;  [facility]     CV RIGHT HEART CATH MEASUREMENTS RECORDED N/A 5/29/2022    Procedure: Right Heart  Catheterization;  Surgeon: Austin Bright MD;  Location:  HE[facility] CARDIAC CATH LAB     CV RIGHT HEART CATH MEASUREMENTS RECORDED N/A 7/1/2022    Procedure: Right Heart Catheterization;  Surgeon: Mike Pope MD;  Location:  [facility] CARDI[facility] CATH LAB     CV RIGHT HEART EXERCISE STRESS STUDY N/A 05/18/2022    Procedure: Stress Drug Study;  Surgeon: Justo Stewart MD;  Location:  HEART CARDIAC CATH LAB     CV SWAN CHOCO PROCEDURE N/A 6/17/2022    Procedure: [place] Choco Procedure;  Surgeon: Sherman Cerda MD;  Location:  HEART CARDIAC CATH LAB     INCISION AND DRAINAGE CHEST WASHOUT, COMBINED N/A 7/11/2022    Procedure: Chest washout and closure;  Surgeon: Darnell Morgan MD;  [facility]     INCISION AND DRAINAGE CHEST WASHOUT, COMBINED N/A 7/12/2022    Procedure: INCISION AND DRAINAGE, WOUND, CHEST, WITH IRRIGATION;  Surgeon: Darius Zamora MD;  Location: [facility]     INSERT ARTERIAL LINE  7/18/2022          INSERT INTRAAORTIC BALLOON PUMP Right 6/23/2022    Procedure: INSERTION, INTRA-AORTIC BALLOON PUMP RIGHT SUBCLAVIAN ARTERY.;  Surgeon: Hayden Veras MD;  Location: [facility]     INSERT VENTRICULAR ASSIST DEVICE LEFT (HEARTMATE II/III) MINIMALLY INVASIVE N/A 7/8/2022    Procedure: MEDIAN STERNOTOMY.  CARDIOPULMONARY BYPASS.  INTRAOPERATIVE TRANSESOPHAGEAL ECHOCARDIOGRAM.  IMPLANT LEFT VENTRICULAR ASSIST DEVICE HEARTMATE III.  PLACEMENT OF PERCUTANEOUS RIGHT VENTRICULAR ASSIST DEVICE.  TEMPORARY CHEST CLOSURE;  Surgeon: Darius Zamora MD;  Location: [facility]     IR CHEST TUBE PLACEMENT NON-TUNNELED RIGHT  8/1/2022     IRRIGATION AND DEBRIDEMENT CHEST WASHOUT, COMBINED N/A 7/13/2022    Procedure: IRRIGATION AND DEBRIDEMENT, CHEST;  Surgeon: Darius Zamora MD;  Location: [facility]     PICC DOUBLE LUMEN PLACEMENT Right 05/28/2022    right basilic 5 fr dl picc 40 cm             Family History:   FAMILY HISTORY: No family history on file.        Social History:   He was born and raised in  [place] and was employed as a .  He has 2 children who can help him with his medical decision making.  He was  for about 10 years and says he has been living on his own for 10 years or so.  He was functioning independently prior to his current illness, but finding it more difficult to get around.          Physical ROS:   The 10 point Review of Systems is negative other than noted in the HPI or here.           Medications:       acetaminophen  975 mg Oral or Feeding Tube Q8H CAMMY     aspirin  81 mg Oral or Feeding Tube Daily     atorvastatin  40 mg Oral QPM     bumetanide  3 mg Intravenous TID     captopril  12.5 mg Oral TID     digoxin  125 mcg Oral Daily     DULoxetine  30 mg Oral Daily     fiber modular (NUTRISOURCE FIBER)  1 packet Per Feeding Tube TID     fluticasone-salmeterol  2 puff Inhalation BID     [START ON 8/3/2022] fluticasone-vilanterol  1 puff Inhalation Daily     hydroxychloroquine  200 mg Oral BID     insulin aspart  1-12 Units Subcutaneous Q4H     [Held by provider] insulin glargine  20 Units Subcutaneous QAM AC     lidocaine  1 patch Transdermal Q24h    And     lidocaine   Transdermal Q8H CAMMY     melatonin  10 mg Oral QPM     multivitamins w/minerals  15 mL Per Feeding Tube Daily     OLANZapine  5 mg Oral BID     oxidized cellulose   Topical Once     pantoprazole (PROTONIX) IV  40 mg Intravenous BID     protein modular  1 packet Per Feeding Tube TID     traZODone  25 mg Oral At Bedtime    Or     traZODone  50 mg Oral At Bedtime     warfarin ANTICOAGULANT  12 mg Oral ONCE at 18:00     Warfarin Therapy Reminder  1 each Oral See Admin Instructions              Allergies:   No Known Allergies       Labs:     Recent Results (from the past 48 hour(s))   Glucose by meter    Collection Time: 07/31/22  4:38 PM   Result Value Ref Range    GLUCOSE BY METER POCT 112 (H) 70 - 99 mg/dL   Glucose by meter    Collection Time: 07/31/22  8:48 PM   Result Value Ref Range    GLUCOSE BY METER  POCT 168 (H) 70 - 99 mg/dL   Glucose by meter    Collection Time: 07/31/22 11:05 PM   Result Value Ref Range    GLUCOSE BY METER POCT 136 (H) 70 - 99 mg/dL   Hemoglobin    Collection Time: 07/31/22 11:40 PM   Result Value Ref Range    Hemoglobin 9.1 (L) 13.3 - 17.7 g/dL   Glucose by meter    Collection Time: 08/01/22  4:11 AM   Result Value Ref Range    GLUCOSE BY METER POCT 157 (H) 70 - 99 mg/dL   Glucose by meter    Collection Time: 08/01/22  5:00 AM   Result Value Ref Range    GLUCOSE BY METER POCT 108 (H) 70 - 99 mg/dL   Glucose by meter    Collection Time: 08/01/22  6:54 AM   Result Value Ref Range    GLUCOSE BY METER POCT 163 (H) 70 - 99 mg/dL   Antithrombin III    Collection Time: 08/01/22  7:42 AM   Result Value Ref Range    Antithrombin III 94 85 - 135 %   Blood Culture Red Lumen    Collection Time: 08/01/22  7:42 AM    Specimen: Red Lumen; Blood   Result Value Ref Range    Culture No growth after 1 day    D dimer quantitative    Collection Time: 08/01/22  7:42 AM   Result Value Ref Range    D-Dimer Quantitative 6.19 (H) 0.00 - 0.50 ug/mL FEU   CBC with platelets    Collection Time: 08/01/22  7:42 AM   Result Value Ref Range    WBC Count 10.6 4.0 - 11.0 10e3/uL    RBC Count 3.09 (L) 4.40 - 5.90 10e6/uL    Hemoglobin 9.1 (L) 13.3 - 17.7 g/dL    Hematocrit 29.2 (L) 40.0 - 53.0 %    MCV 95 78 - 100 fL    MCH 29.4 26.5 - 33.0 pg    MCHC 31.2 (L) 31.5 - 36.5 g/dL    RDW 18.1 (H) 10.0 - 15.0 %    Platelet Count 406 150 - 450 10e3/uL   Lactic acid whole blood    Collection Time: 08/01/22  7:42 AM   Result Value Ref Range    Lactic Acid 1.4 0.7 - 2.0 mmol/L   Comprehensive metabolic panel    Collection Time: 08/01/22  7:42 AM   Result Value Ref Range    Sodium 131 (L) 136 - 145 mmol/L    Potassium 4.1 3.4 - 5.3 mmol/L    Creatinine 0.67 0.67 - 1.17 mg/dL    Urea Nitrogen 21.6 8.0 - 23.0 mg/dL    Chloride 93 (L) 98 - 107 mmol/L    Carbon Dioxide (CO2) 28 22 - 29 mmol/L    Anion Gap 10 7 - 15 mmol/L    Glucose 148  (H) 70 - 99 mg/dL    Calcium 9.0 8.8 - 10.2 mg/dL    Protein Total 6.7 6.4 - 8.3 g/dL    Albumin 3.0 (L) 3.5 - 5.2 g/dL    Bilirubin Total 0.4 <=1.2 mg/dL    Alkaline Phosphatase 205 (H) 40 - 129 U/L    AST 33 10 - 50 U/L    ALT 14 10 - 50 U/L    GFR Estimate >90 >60 mL/min/1.73m2   Magnesium    Collection Time: 08/01/22  7:42 AM   Result Value Ref Range    Magnesium 2.1 1.7 - 2.3 mg/dL   Phosphorus    Collection Time: 08/01/22  7:42 AM   Result Value Ref Range    Phosphorus 4.6 (H) 2.5 - 4.5 mg/dL   Ionized Calcium    Collection Time: 08/01/22  7:42 AM   Result Value Ref Range    Calcium Ionized 4.4 4.4 - 5.2 mg/dL   INR    Collection Time: 08/01/22  7:42 AM   Result Value Ref Range    INR 1.87 (H) 0.85 - 1.15   Fibrinogen activity    Collection Time: 08/01/22  7:42 AM   Result Value Ref Range    Fibrinogen Activity 491 (H) 170 - 490 mg/dL   Hemoglobin    Collection Time: 08/01/22  7:42 AM   Result Value Ref Range    Hemoglobin 9.1 (L) 13.3 - 17.7 g/dL   Glucose by meter    Collection Time: 08/01/22  7:43 AM   Result Value Ref Range    GLUCOSE BY METER POCT 171 (H) 70 - 99 mg/dL   Glucose by meter    Collection Time: 08/01/22 11:50 AM   Result Value Ref Range    GLUCOSE BY METER POCT 109 (H) 70 - 99 mg/dL   Hemoglobin    Collection Time: 08/01/22  1:44 PM   Result Value Ref Range    Hemoglobin 9.4 (L) 13.3 - 17.7 g/dL   Glucose by meter    Collection Time: 08/01/22  3:11 PM   Result Value Ref Range    GLUCOSE BY METER POCT 102 (H) 70 - 99 mg/dL   Blood gas arterial    Collection Time: 08/01/22  5:19 PM   Result Value Ref Range    pH Arterial 7.52 (H) 7.35 - 7.45    pCO2 Arterial 38 35 - 45 mm Hg    pO2 Arterial 44 (L) 80 - 105 mm Hg    FIO2 21     Bicarbonate Arterial 31 (H) 21 - 28 mmol/L    Base Excess/Deficit (+/-) 7.5 (H) -9.0 - 1.8 mmol/L   Hemoglobin    Collection Time: 08/01/22  7:45 PM   Result Value Ref Range    Hemoglobin 9.3 (L) 13.3 - 17.7 g/dL   Glucose by meter    Collection Time: 08/01/22  7:47 PM    Result Value Ref Range    GLUCOSE BY METER POCT 133 (H) 70 - 99 mg/dL   Blood gas arterial with oxyhemoglobin    Collection Time: 08/01/22  9:02 PM   Result Value Ref Range    pH Arterial 7.52 (H) 7.35 - 7.45    pCO2 Arterial 37 35 - 45 mm Hg    pO2 Arterial 61 (L) 80 - 105 mm Hg    Bicarbonate Arterial 30 (H) 21 - 28 mmol/L    Oxyhemoglobin Arterial 91 (L) 92 - 100 %    Base Excess/Deficit (+/-) 6.6 (H) -9.0 - 1.8 mmol/L    FIO2 21    Glucose by meter    Collection Time: 08/01/22 11:05 PM   Result Value Ref Range    GLUCOSE BY METER POCT 135 (H) 70 - 99 mg/dL   Glucose by meter    Collection Time: 08/02/22  3:21 AM   Result Value Ref Range    GLUCOSE BY METER POCT 169 (H) 70 - 99 mg/dL   CBC with platelets    Collection Time: 08/02/22  4:55 AM   Result Value Ref Range    WBC Count 9.0 4.0 - 11.0 10e3/uL    RBC Count 3.11 (L) 4.40 - 5.90 10e6/uL    Hemoglobin 9.0 (L) 13.3 - 17.7 g/dL    Hematocrit 29.4 (L) 40.0 - 53.0 %    MCV 95 78 - 100 fL    MCH 28.9 26.5 - 33.0 pg    MCHC 30.6 (L) 31.5 - 36.5 g/dL    RDW 18.0 (H) 10.0 - 15.0 %    Platelet Count 289 150 - 450 10e3/uL   Lactic acid whole blood    Collection Time: 08/02/22  4:55 AM   Result Value Ref Range    Lactic Acid 1.1 0.7 - 2.0 mmol/L   Comprehensive metabolic panel    Collection Time: 08/02/22  4:55 AM   Result Value Ref Range    Sodium 132 (L) 136 - 145 mmol/L    Potassium 4.0 3.4 - 5.3 mmol/L    Creatinine 0.68 0.67 - 1.17 mg/dL    Urea Nitrogen 21.2 8.0 - 23.0 mg/dL    Chloride 94 (L) 98 - 107 mmol/L    Carbon Dioxide (CO2) 30 (H) 22 - 29 mmol/L    Anion Gap 8 7 - 15 mmol/L    Glucose 144 (H) 70 - 99 mg/dL    Calcium 8.8 8.8 - 10.2 mg/dL    Protein Total 6.6 6.4 - 8.3 g/dL    Albumin 2.9 (L) 3.5 - 5.2 g/dL    Bilirubin Total 0.4 <=1.2 mg/dL    Alkaline Phosphatase 215 (H) 40 - 129 U/L    AST 30 10 - 50 U/L    ALT 13 10 - 50 U/L    GFR Estimate >90 >60 mL/min/1.73m2   Magnesium    Collection Time: 08/02/22  4:55 AM   Result Value Ref Range    Magnesium  "2.1 1.7 - 2.3 mg/dL   Phosphorus    Collection Time: 08/02/22  4:55 AM   Result Value Ref Range    Phosphorus 4.7 (H) 2.5 - 4.5 mg/dL   Ionized Calcium    Collection Time: 08/02/22  4:55 AM   Result Value Ref Range    Calcium Ionized 4.4 4.4 - 5.2 mg/dL   INR    Collection Time: 08/02/22  4:55 AM   Result Value Ref Range    INR 2.08 (H) 0.85 - 1.15   Hemoglobin    Collection Time: 08/02/22  4:55 AM   Result Value Ref Range    Hemoglobin 9.0 (L) 13.3 - 17.7 g/dL   Glucose by meter    Collection Time: 08/02/22  7:43 AM   Result Value Ref Range    GLUCOSE BY METER POCT 172 (H) 70 - 99 mg/dL   Glucose by meter    Collection Time: 08/02/22 11:25 AM   Result Value Ref Range    GLUCOSE BY METER POCT 114 (H) 70 - 99 mg/dL   Hemoglobin    Collection Time: 08/02/22  1:19 PM   Result Value Ref Range    Hemoglobin 9.7 (L) 13.3 - 17.7 g/dL          Physical and Psychiatric Examination:     BP 97/80 (BP Location: Left arm, Cuff Size: Adult Regular)   Pulse 108   Temp 98.8  F (37.1  C) (Oral)   Resp 18   Ht 1.702 m (5' 7\")   Wt 68.5 kg (151 lb 0.2 oz)   SpO2 99%   BMI 23.65 kg/m    Weight is 151 lbs .24 oz  Body mass index is 23.65 kg/m .    Physical Exam:  I have reviewed the physical exam as documented by by the medical team and agree with findings and assessment and have no additional findings to add at this time.         MSE:   Appearance: awake, alert and appeared older than stated age  Attitude:  cooperative  Eye Contact:  fair  Mood:  depressed  Affect:  intensity is blunted  Speech:  dysarthria, very soft spoken and difficult to understand   Psychomotor Behavior:  evidence of tardive dyskinesia  Thought Process:  goal oriented  Associations:  no loose associations  Thought Content:  no evidence of suicidal ideation or homicidal ideation and no evidence of psychotic thought  Insight:  fair  Judgement:  fair  Oriented to:  was not assessed today due to fatigue   Attention Span and Concentration:  fair  Recent and " Remote Memory:  not formally assessed              DSM-5 Diagnosis:   Depression and anxiety secondary to severe cardiovascular disease, etc   Delirium           Assessment:   Due to his confusion I have concerns about the anticholinergic burden from the hydroxyzine; I think Seroquel would be a better choice.  It is a bit sloppy to use 2 different antipsychotics but I think for now we should continue the Zyprexa, later taper off of it if Seroquel is helping.  I see that Seroquel had been used in the past but was recently discontinued but I cannot figure out why it was stopped.  I think it is worth trying it again if it does not work we can re-evaluate options.   In my experience Cymbalta is not particularly effective with anxiety so we should look into an alternative.  His QTC appears very prolonged but it is difficult to interpret due to all the baseline interference EKGs.  Due to his delirium I think would be better to use Seroquel at bedtime rather than trazodone.          Summary of Recommendations:   Instead of the hydroxyzine I would try Seroquel 25 mg 3 times a day scheduled but hold if he is sedated.  Also try 50 mg at bedtime with a may repeat as needed.  I would use the Seroquel at bedtime rather than trazodone.   Consider dropping the melatonin dose to 3 mg; some people have trouble at higher doses  Instead of Cymbalta I would use Zoloft starting at 50 mg/day with a plan to increase it to 100 mg/day in about 1 week  Delirium precautions (up during the day with lights on; lights off at night, avoid interruptions during the night as much as possible; family visits; encourage wearing glasses; reorientation)    Page me or re-consult psychiatry as needed (psychiatry is signing off).     Behzad Palomo M.D.   [facility]   Contact information available via [facility] Paging/Directory.  If I am not available, then [facility] intake ([phone]) should know who   Is on call      "      \"This dictation was performed with voice recognition software and may contain errors,  omissions and inadvertent word substitution.\"           "

## 2022-08-02 NOTE — PROGRESS NOTES
Cardiology Progress Note    Assessment & Plan   Dandy Sands is a 61 year old male with PMH of lupus, antiphospholipid syndrome, HTN, HLD, HFrEF 2/2 ICM who presented with cardiogenic shock c/b multiorgan failure (JOSE, shock liver) requiring mechanical support with IABP, now s/p HM3 LVAD 7/8/22 by Dr. Zamora with intraoperative course c/b RV failure s/p 29F Protek duo via RIJ. Post op course c/b hemopericardium s/p repeat washout with chest left open. Chest closed 7/13/22 and decannulated from RVAD 7/21    Changes Today:  -Chest tube management per CVTS  -No changes in cardiac meds today  -LVAD with acceptable function  -Anticoagulation per CVTS     #HFrEF 2/2 ICM s/p HM3 LVAD in setting of cardiogenic shock (SCAI D)  #RV failure s/p Protek duo temporary RVAD s/p decannulation 7/21  #RV thrombus  #Post chest closure hemopericardium s/p repeat washout (7/12)  Patient with ICM s/p HM3 LVAD 7/8/22 by Dr. Zamora in setting of presentation for cardiogenic shock requiring MCS with IABP as prior to HM (initially evaluated for heart transplant, however noted to have substantially elevated DSAs during course of care, so decision made to proceed with LVAD given patient already on temporary MCS). Intraoperative course c/b RV failure resulting in cardiogenic shock requiring high dose pressors necessitating RVAD support. Initial post-op course c/b hemopericardium requiring repeat washout with chest left open, however has since recovered well with decannulation on 7/21 with extubation day prior. Has continued to tolerate well from hemodynamic and end organ standpoint. Continuing to work on pulmonary toilet and diuresis to improve his oxygenation and total body fluid status.   -LVAD: continues to have good flows, no alarms and stable end organ perfusion; continue speed at 5300  -continue digoxin for RV support  -AC, antiplatelets: continue ASA; heparin high intensity on hold given epistaxis/bleeding concerns, defer to surgery  "when it would be appropriate to restart; On warfarin  -Volume status: Continue bumex 3mg IV BID with goal negative ~ 1-2L  -rhythm: sinus              -of note, patient with climbing capture threshold of RV lead; will need to be evaluated in future by EP team  -blood pressure: Continue captopril to 12.5mg TID, may increase if MAPs remain elevated  -encourage ICS, pulmonary toilet  -delirium precautions     The patient's care was discussed with the Attending Physician, Dr. Faustin, Bedside Nurse and Primary team    Chris Lawrence MD  Cardiology Fellow      Interval History   Reviewed events from last 24 hours.  Chest tube placed yesterday by IR. Breathing improved.      Physical Exam   Temp: 98  F (36.7  C) Temp src: Oral BP: (!) 74/51 Pulse: 112   Resp: 16 SpO2: 94 % O2 Device: None (Room air) Oxygen Delivery: 2 LPM  Vitals:    07/30/22 0600 08/01/22 0618 08/02/22 0206   Weight: 68.6 kg (151 lb 3.8 oz) 67.8 kg (149 lb 7.6 oz) 68.5 kg (151 lb 0.2 oz)     Vital Signs with Ranges  Temp:  [97.5  F (36.4  C)-98.2  F (36.8  C)] 98  F (36.7  C)  Pulse:  [101-118] 112  Resp:  [12-24] 16  BP: ()/(42-99) 74/51  SpO2:  [91 %-98 %] 94 %  I/O last 3 completed shifts:  In: 1410 [P.O.:390; I.V.:30; NG/GT:240]  Out: 3808 [Urine:3125; Chest Tube:683]     , Blood pressure (!) 74/51, pulse 112, temperature 98  F (36.7  C), temperature source Oral, resp. rate 16, height 1.702 m (5' 7\"), weight 68.5 kg (151 lb 0.2 oz), SpO2 94 %.  151 lbs .24 oz  General Appearance:  Older male in NAD   Respiratory: coarse lung sounds bilaterally  Cardiovascular: vad hum, driveline site c/d/i, chest incisions c/d/i, JVD ~10-12 cm H20  GI: soft, nt, bs active  Skin: warm, well perfused, trace diffuse edema  Neuro: awake, alert, interactive, speech fluent, moving all 4 extremities    Medications     dextrose         acetaminophen  975 mg Oral or Feeding Tube Q8H CAMMY     aspirin  81 mg Oral or Feeding Tube Daily     atorvastatin  40 mg Oral QPM     " bumetanide  3 mg Intravenous TID     captopril  12.5 mg Oral TID     digoxin  125 mcg Oral Daily     DULoxetine  30 mg Oral Daily     fiber modular (NUTRISOURCE FIBER)  1 packet Per Feeding Tube TID     fluticasone-salmeterol  2 puff Inhalation BID     [START ON 8/3/2022] fluticasone-vilanterol  1 puff Inhalation Daily     hydroxychloroquine  200 mg Oral BID     insulin aspart  1-12 Units Subcutaneous Q4H     [Held by provider] insulin glargine  20 Units Subcutaneous QAM AC     lidocaine  1 patch Transdermal Q24h    And     lidocaine   Transdermal Q8H CAMMY     melatonin  10 mg Oral QPM     multivitamins w/minerals  15 mL Per Feeding Tube Daily     OLANZapine  5 mg Oral BID     oxidized cellulose   Topical Once     oxyCODONE  5 mg Oral or Feeding Tube Q8H     pantoprazole (PROTONIX) IV  40 mg Intravenous BID     protein modular  1 packet Per Feeding Tube TID     traZODone  25 mg Oral At Bedtime    Or     traZODone  50 mg Oral At Bedtime     warfarin ANTICOAGULANT  12 mg Oral ONCE at 18:00     Warfarin Therapy Reminder  1 each Oral See Admin Instructions       Data   Recent Labs   Lab 08/02/22  0743 08/02/22  0455 08/02/22  0321 08/01/22  1947 08/01/22  1945 08/01/22  1511 08/01/22  1344 08/01/22  0743 08/01/22  0742 07/31/22  0802 07/31/22  0712   WBC  --  9.0  --   --   --   --   --   --  10.6  --  10.0   HGB  --  9.0*  9.0*  --   --  9.3*  --  9.4*  --  9.1*  9.1*   < > 9.2*   MCV  --  95  --   --   --   --   --   --  95  --  93   PLT  --  289  --   --   --   --   --   --  406  --  335   INR  --  2.08*  --   --   --   --   --   --  1.87*  --  1.62*   NA  --  132*  --   --   --   --   --   --  131*  --  131*   POTASSIUM  --  4.0  --   --   --   --   --   --  4.1  --  4.1   CHLORIDE  --  94*  --   --   --   --   --   --  93*  --  93*   CO2  --  30*  --   --   --   --   --   --  28  --  28   BUN  --  21.2  --   --   --   --   --   --  21.6  --  25.0*   CR  --  0.68  --   --   --   --   --   --  0.67  --  0.67    ANIONGAP  --  8  --   --   --   --   --   --  10  --  10   ELDA  --  8.8  --   --   --   --   --   --  9.0  --  8.9   * 144* 169*   < >  --    < >  --    < > 148*   < > 144*   ALBUMIN  --  2.9*  --   --   --   --   --   --  3.0*  --  2.9*   PROTTOTAL  --  6.6  --   --   --   --   --   --  6.7  --  6.5   BILITOTAL  --  0.4  --   --   --   --   --   --  0.4  --  0.5   ALKPHOS  --  215*  --   --   --   --   --   --  205*  --  221*   ALT  --  13  --   --   --   --   --   --  14  --  14   AST  --  30  --   --   --   --   --   --  33  --  32    < > = values in this interval not displayed.       Recent Results (from the past 24 hour(s))   IR Chest Tube Place Non Tunneled Right    Narrative    Procedure date: 8/1/2022.    History: The patient has a history of heart failure, status post  implantable cardiac defibrillator, LVAD, and RVAD placement. RVAD was  removed on 7/21/2022, patient now with worsening first of breath.  Recent chest x-ray notable for a moderate right pleural effusion.  Chest tube placement was requested.    Preprocedure diagnosis: Right pleural effusion.    Post procedure diagnosis: Right pleural effusion, improved.    Informed consent was obtained from son via telephone, and the site  confirmed.    : Lm Mendoza PA-C.    Assist: LENA Maravilla.    Medications: 1% lidocaine, 8 cc subcutaneously.    Sedation face to face time: None.    Nursing: The patient was continuously monitored by IR nursing staff  under my supervision, and remained stable throughout the procedure.    Consent: The procedure, as well as risks and benefits was discussed  with the patient's son. Informed written and verbal consent was  obtained via telephone prior to the procedure.    Findings: Ultrasound was used to evaluate the posterior right chest.  An effusion was seen with the patient in the left lateral position.  The posterior right chest was then prepped and draped in usual sterile  fashion. Under  ultrasound guidance, the tissues overlying the effusion  were locally anesthetized with subcutaneous administration of 1%  lidocaine, and a 3 mm skin incision was made with a # 11 blade. Under  ultrasound guidance, a 5 Fr., 10 cm Yueh needle/catheter combo was  advanced into the right pleural space, with return of clear serous  fluid. Upon return of fluid, the Yueh catheter was advanced off the  needle. Under ultrasound guidance, a 0.35 Bentson wire was advanced  through the catheter into the pleural space. Under ultrasound  guidance, the 5 Fr. Yueh catheter was exchanged over wire for a 14 Fr.  Qi dilator. Under ultrasound guidance the subcutaneous tissues were  dilated over wire. A new 14 Albanian Non-locking Flexima J tipped chest  tube was selected and prepared. Under ultrasound guidance, the Qi  dilator was exchanged over wire for the 14 [place]ian Flexima J-tip chest  tube, which was advanced into the right pleural space. Subsequent  ultrasound evaluation confirmed proper placement within the pleural  space. The catheter was secured to the skin with a single 2-0 Ethilon  suture,  and connected to water-seal drainage apparatus. The site was  cleansed and dressed with a sterile bandage. Images were saved  throughout the procedure. The procedure was well-tolerated, with no  immediate complications.     Contrast: None.    Fluoroscopy time: None.    Specimens: None.    Estimated blood loss: 0.5 cc.      Impression    Impression: Ultrasound guided placement of right-sided 14 Fr.  non-locking Flexima J-tipped chest tube. Return of clear serous fluid.    Plan: Patient transported to the inpatient care unit in stable  condition. Dressing changes per floor routine. Follow up per primary  team.    Attestation: The physician assistant (PA) who performed this procedure  and signed the above report is licensed to practice in the state [facility] pursuant to [place] Statute 147A.09.  This includes meeting the  Statute and  [place] Board of Medical [employer] requirement of an  active Delegation Agreement, which documents delegation of services by  primary and alternate supervising physicians. All services rendered  are performed under a collaborative agreement with Dr. Lakisha Hines, Director of Interventional Radiology, [facility]. Placement    LEIA LEMOS PA-C         SYSTEM ID:  [id number]   XR Chest Port 1 View    Narrative    Portable chest    INDICATION: Chest tube placement, air leak    COMPARISON: 7/31/2022    FINDINGS: Cardiomegaly. LVAD. Left subclavian transvenous approach  ICD. Median sternotomy. Right basilar chest catheter placement with  small right pneumothorax. No tension component. Right PICC tip in the  upper SVC. Feeding tube beyond the inferior margin of the image.  Decrease of right pleural effusion.      Impression    IMPRESSION: Small right pneumothorax with chest tube placement.  Decreased right pleural effusion. Cardiomegaly. LVAD. ICD.    CONSTANTINO OWEN MD         SYSTEM ID:  [id number]   CT Head w/o Contrast    Narrative    EXAM: CT HEAD W/O CONTRAST  8/1/2022 3:43 PM     HISTORY:  AMS, recent fall, was on heparin       COMPARISON:  CT head, 5/19/2022    TECHNIQUE: Using multidetector thin collimation helical acquisition  technique, axial, coronal and sagittal CT images from the skull base  to the vertex were obtained without intravenous contrast.   (topogram) image(s) also obtained and reviewed.    FINDINGS:  No intracranial hemorrhage, mass effect, or midline shift. No acute  loss of gray-white matter differentiation in the cerebral hemispheres.  Moderate loss of cerebral volume. Ventricles are proportionate to the  cerebral sulci. Clear basal cisterns.    The bony calvaria and the bones of the skull base are normal. Mild  mucosal thickening in the right ethmoid air cells, retention cyst in  the left maxillary sinus, otherwise the visualized portions of the  paranasal  sinuses and mastoid air cells are clear. Grossly normal  orbits.       Impression    IMPRESSION: No acute intracranial pathology. Moderate cerebral  atrophy.    I have personally reviewed the examination and initial interpretation  and I agree with the findings.    JORGE MORRELL MD         SYSTEM ID:  [id number]

## 2022-08-02 NOTE — PROGRESS NOTES
CVTS PROGRESS NOTE  08/02/2022      ASSESSMENT Dandy Sands is a 61 year old male with a PMH of CAD s/p PCI to LAD, MI, ICM, acute on chronic heart failure, s/p CRT-D, severe MR, antiphospholipid antibody syndrome on chronic warfarin, SLE, HTN, HLD, recent hospitalization 5/16-5/26 s/p RCA, LAD stenting who underwent RVAD (29F Protek duo RIJ) LVAD placement (HeartMate 3) on 7/8/22 by Dr. Zamora. Intraoperative course complicated by IABP dysfunction and RV failure, requiring crash onto bypass / vasopressor support, NO, and protek placement. Now s/p chest closure and NO wean 7/11. Return to OR for hemopericardium (7/12) and chest re-closure 7/13. Protek RVAD removed 7/21.    PLAN:   Neuro/ pain/ sedation:  #Acute Postoperative pain  #Depression   #Anxiety due to a medical condition  #Insomnia  #Delerium   Delirious intermittently   - Monitor neurological status. Notify the MD for any acute changes in exam.  - Pain:    -Scheduled tylenol. Scheduled oxycodone 5mg q8h discontinued on 8/2   -PRN oxycodone 5mg q4h, dilaudid 0.2 mg q 2 hours PRN  - Depression: Duloxetine 30mg  - Sleep: Melatonin 10 mg HS   - Discontinue Seroquel 100 mg HS   - On Trazodone -increase to 75mg qhs   - Zyprexa 5mg BID  - Anxiety: Valproate po 250 mg BID and 500mg Bedtime   - Hydroxyzine PRN              - Ativan 0.5-1.0mg IV q6h PRN added for anxiety, discontinued on 8/2 for worsening confusion.   - PTA meds: hydroxyzine 25mg PRN, zolpidem 5mg, melatonin 3mg, lorazepam 0.5mg  - QTc(7/18)- 683 msec, no haldol  - Recent fall on 7/29 and increased delirium over weekend; CT scan of head on 8/1 did not show any acute intracranial pathologies, ABG was also not significant   - Continue sitter  - Patient not sleeping at all at night and this is likely contributing significantly to his delirium and confusion. He is also very anxious at night. Melatonin and benadryl PRN added.   Psych consulted-stop Trazodone and Cymbalta. Add Seroquel 25 mg tid and  50 mg at bedtime. Start Zoloft. Please see their note.   Check Echo today for status of RV clot.        Pulmonary:   #Acute hypoxic respiratory failure on iMV  #Mild-moderate emphysema  #History of COVID-19  - Saturating well on nasal cannula 2 LPM, was doing well on room air this morning on 8/2  - Maintain SpO2> 92%  - Incentive spirometry   - Right chest tube placed by IR via ultrasound on 8/1 for moderate pleural effusion; chest x-ray post-procedure showed a small right pneumothorax. Continue to monitor.     Cardiovascular:    #S/p LVAD placement (HeartMate 3) on 7/8/22   #S/p RVAD (29F Protek duo RIJ) on 7/8/22  #S/p chest closure 7/11  #Cardiogenic shock  #Advanced ischemic cardiomyopathy, class IV, stage D  #CAD s/p PCI to LAD (2005)  #S/p CRT-D (2006)  #History of MI (2007)  #Severe MR  #Antiphospholipid antibody syndrome on chronic warfarin  #HTN, HLD  #Recent hospitalization 5/16-5/26 s/p RCA, LAD stenting  #Atrial fibrillation   # RV mobile clot   - Monitor hemodynamic status. Chest closure and oxygenator splicing 7/11. Reopened 7/12 for I&D and left open, closed 7/13. RVAD removed 7/21.  - Goal MAP > 65   - Pulse continues to be in 100's; underlying rhythm is sinus; continue to monitor   - PTA meds(held): clopidogrel 75mg, lasix 40mg, warfarin 5mg  - LVAD management per Advanced HF service  - AC Plan: High intensity Heparin for RV mobile thrombus  - Aspirin 81 mg  - Atorvastatin 40mg   - Hydralazine 10-20 mg q 4 hours PRN for MAP >90  - Captopril 6.25mg TID dose titration per cardiology   - Warfarin 7/24 per pharmacy    GI / Nutrition:   #GERD  #Congestive hepatopathy in the setting of cardiac insuffiencey - Resolved  #Hematemesis / oral mucosal bleeding -resolved    - Full liquid diet, SLP following  - Continue NJT at goal   - Nutrition following  - Bowel regimen prn (miralax / senna)  - Trend LFT's every other day  - GI consulted 7/31 for black tarry stools and a possible GI bleed, however it is more  likely swallowed blood from epistaxis that patient previously had. GI does not recommend an upper endoscopy at this time. Continue IV PPI BID.    Renal/ Fluid Balance:    - Strict I/O, daily weights   - Avoid/limit nephrotoxins as able  - Replete lytes PRN per protocol  - PTA meds: tamsulosin 0.4mg (held)  - Goal net negative ~ 1L   Bumex gtt for part of 7/24: total UOP 8L, gtt stopped   Bumex 4mg x2 7/25 with over 6L UOP  - Bumex 3mg x3 7/27 and 7/28 with good diuresis, continue bumex 3 mg IVincreased to TID on 7/31 given SOB. Will continue one more day per Cardiology and switch to orals likely tomorrow.     Endocrine:    #Stress induced hyperglycemia  Preop A1c 5.5%  - Insulin glargine 20U and high sliding scale  - Goal BG <180 for optimal healing   - Holding glargine 7/29 in anticipation of possibly cycling tube feeding, has had minimal insulin requirements 7/29 with TF running. Will continue to hold and plan to start cycle feeds 7/30. Restarted NPH due to elevated BG's today in the 140's on 8/2.     ID/ Antibiotics:  #Periopearive antibiosis (s/p course of Cefepime, vancomycin, rifampin, fluconazole)  #HAP - Enterococcus faecalis PNA, s/p treatment   - 7/12 Pan culture: Bcx x2, UA/UC -> negative  - 7/14 Endotracheal sputum culture (4+ candida albicans, 1+ E.Coli, 3+ enterococcus faecalis)   - Zosyn for HAP(7/15-7/22)  - 7/18 C.diff- negative  - 7/25 Blood cultures drawn for unexplained hypotension   - NGTD    Heme:     #Acute blood loss anemia  #Acute blood loss thrombocytopenia  #History of DVT and PE   #Antiphospholipid antibody syndrome  #History of iron deficiency anemia  - Monitor for s/sx of bleeding  - Trend CBC and coags  - Hgb goal >8   Hgb stable in 9's and trending up, no signs or symptoms of bleeding  - Plt goal > 20  - History of nose bleed on 7/30, lost 2 units of blood. Transfused for hgb 6.8, 2U received. Recheck ordered and has been stable in 9's  - Melena reported on 7/30 - 3 stools over night,  see GI above, stools have been brown in color as of 8/2  - Continue to dose coumadin, hold heparin gtt as INR trending up and recent bleed requiring transfusion     Rheumatology:  #SLE  - Chronic: symptoms include arthritis, photosensitivity, fatigue, and skin manifestations  - PTA meds: hydroxychloroquine 200mg, resumed 7/16  - Rheumatology consulted and following. Follow dsDNA and complement levels(7/18)   - Will need cellcept restarted when appropriate    Prophylaxis:    - DVT: SCD + high intensity heparin-held for bleeding + warfarin   - PPI    Disposition:  - Transfer to  on 7/28    LENA Sebastian     Discussed with Dr. Noah Gilman PA-C  Cardiothoracic Surgery  Pager: [phone]    ----------------------------------------------------------------    Interval Events:    No overnight events. Patient states he only got 3 hours of sleep last night and experienced some shortness of breath. He does report some dizziness when he gets up with therapy but this resolves quickly. Patient also notes some discomfort around his sternum when he coughs and around his right chest tube, but notes the pain is well managed on current regimen. Breathing is stable on 2 LPM nasal cannula. Tolerating diet but has nausea during tube feeds and has poor appetite overall; primarily taking tube feeds. Would like NJ out as soon as possible. Patient is passing flatus and is having regular bowel movements that have been in brown in color with no blood noted. He denies any abdominal pain or vomiting. Patient also denies any chest pain, palpitations, syncopal symptoms, chills, myalgias, or sternal popping/clicking.     OBJECTIVE:   1. VITAL SIGNS:   Temp:  [97.5  F (36.4  C)-98.2  F (36.8  C)] 98  F (36.7  C)  Pulse:  [101-118] 112  Resp:  [12-24] 16  BP: ()/(42-99) 74/51  SpO2:  [91 %-98 %] 94 %  Resp: 16      2. INTAKE/ OUTPUT:   I/O last 3 completed shifts:  In: 1410 [P.O.:390; I.V.:30; NG/GT:240]  Out:  3808 [Urine:3125; Chest Tube:683]    3. PHYSICAL EXAMINATION:   General: Laying in bed, somnolent   Neuro: Follows commands, no focal deficits noted  Resp: RRR on 2 LPM of nasal cannula. No wheezing, crackles, or rhonchi noted in posterior upper and lower lung fields bilaterally  CV: Tachycardic, LVAD hum noted. No murmurs, gallops, or friction rubs   Abdomen: Soft, non-distended, non-tender. Bowel sounds present in all quadrants   Incisions: C/d/i, no erythema   Extremities: Warm and well perfused, no edema of lower extremities bilaterally   LVAD Flow 3.8 - 3.9, PI 3.5 - 5.1, Speed 5300, Power 3.8 - 3.9     Right pigtail 683/30cc, no air leak     4. INVESTIGATIONS:   Arterial Blood Gases   Recent Labs   Lab 08/01/22  2102 08/01/22  1719   PH 7.52* 7.52*   PCO2 37 38   PO2 61* 44*   HCO3 30* 31*     Complete Blood Count   Recent Labs   Lab 08/02/22  0455 08/01/22  1945 08/01/22  1344 08/01/22  0742 07/31/22  2340 07/31/22  0712 07/30/22  2330 07/30/22  1009   WBC 9.0  --   --  10.6  --  10.0  --  11.7*   HGB 9.0*  9.0* 9.3* 9.4* 9.1*  9.1*   < > 9.2*   < > 6.8*     --   --  406  --  335  --  439    < > = values in this interval not displayed.     Basic Metabolic Panel  Recent Labs   Lab 08/02/22  0743 08/02/22  0455 08/02/22  0321 08/01/22  2305 08/01/22  0743 08/01/22  0742 07/31/22  0802 07/31/22  0712 07/30/22  1149 07/30/22  1009   NA  --  132*  --   --   --  131*  --  131*  --  133*   POTASSIUM  --  4.0  --   --   --  4.1  --  4.1  --  4.3   CHLORIDE  --  94*  --   --   --  93*  --  93*  --  94*   CO2  --  30*  --   --   --  28  --  28  --  29   BUN  --  21.2  --   --   --  21.6  --  25.0*  --  20.7   CR  --  0.68  --   --   --  0.67  --  0.67  --  0.68   * 144* 169* 135*   < > 148*   < > 144*   < > 142*    < > = values in this interval not displayed.     Liver Function Tests  Recent Labs   Lab 08/02/22  0455 08/01/22  0742 07/31/22  0712 07/30/22  1009   AST 30 33 32 34   ALT 13 14 14 11    ALKPHOS 215* 205* 221* 195*   BILITOTAL 0.4 0.4 0.5 0.5   ALBUMIN 2.9* 3.0* 2.9* 2.8*   INR 2.08* 1.87* 1.62* 1.58*     Pancreatic Enzymes  No lab results found in last 7 days.  Coagulation Profile  Recent Labs   Lab 08/02/22  0455 08/01/22  0742 07/31/22  0712 07/30/22  1009   INR 2.08* 1.87* 1.62* 1.58*         5. RADIOLOGY:   Recent Results (from the past 24 hour(s))   US Chest Pleural Effusion Imaging    Narrative    Exam: US CHEST/PLEURAL EFFUSION IMAGING, 8/1/2022 9:20 AM    Indication: Look for pleural effusion    Comparison: Chest radiograph 7/31/2022.    Findings:   Targeted ultrasound to evaluate for pleural effusions.  Small right  and trace left anechoic fluid collections.       Impression    Impression: Small right and trace left pleural effusions.    I have personally reviewed the examination and initial interpretation  and I agree with the findings.    JOSE M OSHEA MD         SYSTEM ID:  [id number]   IR Chest Tube Place Non Tunneled Right    Narrative    Procedure date: 8/1/2022.    History: The patient has a history of heart failure, status post  implantable cardiac defibrillator, LVAD, and RVAD placement. RVAD was  removed on 7/21/2022, patient now with worsening first of breath.  Recent chest x-ray notable for a moderate right pleural effusion.  Chest tube placement was requested.    Preprocedure diagnosis: Right pleural effusion.    Post procedure diagnosis: Right pleural effusion, improved.    Informed consent was obtained from son via telephone, and the site  confirmed.    : Lm Mendoza PA-C.    Assist: LENA Maravilla.    Medications: 1% lidocaine, 8 cc subcutaneously.    Sedation face to face time: None.    Nursing: The patient was continuously monitored by IR nursing staff  under my supervision, and remained stable throughout the procedure.    Consent: The procedure, as well as risks and benefits was discussed  with the patient's son. Informed written and verbal consent  was  obtained via telephone prior to the procedure.    Findings: Ultrasound was used to evaluate the posterior right chest.  An effusion was seen with the patient in the left lateral position.  The posterior right chest was then prepped and draped in usual sterile  fashion. Under ultrasound guidance, the tissues overlying the effusion  were locally anesthetized with subcutaneous administration of 1%  lidocaine, and a 3 mm skin incision was made with a # 11 blade. Under  ultrasound guidance, a 5 Fr., 10 cm Yueh needle/catheter combo was  advanced into the right pleural space, with return of clear serous  fluid. Upon return of fluid, the Yueh catheter was advanced off the  needle. Under ultrasound guidance, a 0.35 Bentson wire was advanced  through the catheter into the pleural space. Under ultrasound  guidance, the 5 Fr. Yueh catheter was exchanged over wire for a 14 Fr.  Qi dilator. Under ultrasound guidance the subcutaneous tissues were  dilated over wire. A new 14 [place] Non-locking Flexima J tipped chest  tube was selected and prepared. Under ultrasound guidance, the Qi  dilator was exchanged over wire for the 14 [place] Flexima J-tip chest  tube, which was advanced into the right pleural space. Subsequent  ultrasound evaluation confirmed proper placement within the pleural  space. The catheter was secured to the skin with a single 2-0 Ethilon  suture,  and connected to water-seal drainage apparatus. The site was  cleansed and dressed with a sterile bandage. Images were saved  throughout the procedure. The procedure was well-tolerated, with no  immediate complications.     Contrast: None.    Fluoroscopy time: None.    Specimens: None.    Estimated blood loss: 0.5 cc.      Impression    Impression: Ultrasound guided placement of right-sided 14 Fr.  non-locking Flexima J-tipped chest tube. Return of clear serous fluid.    Plan: Patient transported to the inpatient care unit in stable  condition. Dressing  changes per floor routine. Follow up per primary  team.    Attestation: The physician assistant (PA) who performed this procedure  and signed the above report is licensed to practice in the state of  [place] pursuant to [place] Statute 147[place].09.  This includes meeting the  Statute and [place] Board of Medical Practice requirement of an  active Delegation Agreement, which documents delegation of services by  primary and alternate supervising physicians. All services rendered  are performed under a collaborative agreement with Dr. Lakisha Hines, Director of Interventional Radiology, [facility]. Placement    LEIA LEMOS PA-C         SYSTEM ID:  [id number]   XR Chest Port 1 View    Narrative    Portable chest    INDICATION: Chest tube placement, air leak    COMPARISON: 7/31/2022    FINDINGS: Cardiomegaly. LVAD. Left subclavian transvenous approach  ICD. Median sternotomy. Right basilar chest catheter placement with  small right pneumothorax. No tension component. Right PICC tip in the  upper SVC. Feeding tube beyond the inferior margin of the image.  Decrease of right pleural effusion.      Impression    IMPRESSION: Small right pneumothorax with chest tube placement.  Decreased right pleural effusion. Cardiomegaly. LVAD. ICD.    CONSTANTINO OWEN MD         SYSTEM ID:  [id number]   CT Head w/o Contrast    Narrative    EXAM: CT HEAD W/O CONTRAST  8/1/2022 3:43 PM     HISTORY:  AMS, recent fall, was on heparin       COMPARISON:  CT head, 5/19/2022    TECHNIQUE: Using multidetector thin collimation helical acquisition  technique, axial, coronal and sagittal CT images from the skull base  to the vertex were obtained without intravenous contrast.   (topogram) image(s) also obtained and reviewed.    FINDINGS:  No intracranial hemorrhage, mass effect, or midline shift. No acute  loss of gray-white matter differentiation in the cerebral hemispheres.  Moderate loss of cerebral volume. Ventricles  are proportionate to the  cerebral sulci. Clear basal cisterns.    The bony calvaria and the bones of the skull base are normal. Mild  mucosal thickening in the right ethmoid air cells, retention cyst in  the left maxillary sinus, otherwise the visualized portions of the  paranasal sinuses and mastoid air cells are clear. Grossly normal  orbits.       Impression    IMPRESSION: No acute intracranial pathology. Moderate cerebral  atrophy.    I have personally reviewed the examination and initial interpretation  and I agree with the findings.    JORGE MORRELL MD         SYSTEM ID:  [id number]       =========================================

## 2022-08-02 NOTE — PLAN OF CARE
Neuro: A&Ox3-4. Pt is intermittently confused. Restless/agitated at times. Sitter at bedside.    Cardiac: ST HR's in 100-110's. Heartmate 3, values WDL. Doppler MAPs 68-75.   Respiratory: Sating > 93% on RA. Dyspnea on exertion. Tachypniec at times.  GI/: Adequate urine output. Fitzgerald in place. Loose BM X1  Diet/appetite: Regular adult diet. 75 mL continuous tube feeds with 30 mL free water flushes from 6p-10a.  Activity:  Assist of 2 with activity.   Pain: Pain control was difficult to achieve overnight. PRN oxycodone given x2, PRN dilaudid given x1.   Skin: No new deficits noted.  LDA's: R triple lumen PICC line.     Plan: Continue with POC. Notify primary team with changes.

## 2022-08-03 ENCOUNTER — APPOINTMENT (OUTPATIENT)
Dept: GENERAL RADIOLOGY | Facility: CLINIC | Age: 62
DRG: 001 | End: 2022-08-03
Attending: PHYSICIAN ASSISTANT
Payer: COMMERCIAL

## 2022-08-03 ENCOUNTER — APPOINTMENT (OUTPATIENT)
Dept: OCCUPATIONAL THERAPY | Facility: CLINIC | Age: 62
DRG: 001 | End: 2022-08-03
Attending: INTERNAL MEDICINE
Payer: COMMERCIAL

## 2022-08-03 ENCOUNTER — APPOINTMENT (OUTPATIENT)
Dept: CARDIOLOGY | Facility: CLINIC | Age: 62
DRG: 001 | End: 2022-08-03
Attending: PHYSICIAN ASSISTANT
Payer: COMMERCIAL

## 2022-08-03 LAB
ALBUMIN SERPL BCG-MCNC: 2.9 G/DL (ref 3.5–5.2)
ALBUMIN UR-MCNC: NEGATIVE MG/DL
ALP SERPL-CCNC: 215 U/L (ref 40–129)
ALT SERPL W P-5'-P-CCNC: 15 U/L (ref 10–50)
ANION GAP SERPL CALCULATED.3IONS-SCNC: 12 MMOL/L (ref 7–15)
APPEARANCE UR: CLEAR
AST SERPL W P-5'-P-CCNC: 37 U/L (ref 10–50)
BACTERIA BLD CULT: NO GROWTH
BILIRUB SERPL-MCNC: 0.4 MG/DL
BILIRUB UR QL STRIP: NEGATIVE
BUN SERPL-MCNC: 22.6 MG/DL (ref 8–23)
CA-I BLD-MCNC: 4.4 MG/DL (ref 4.4–5.2)
CALCIUM SERPL-MCNC: 8.7 MG/DL (ref 8.8–10.2)
CHLORIDE SERPL-SCNC: 94 MMOL/L (ref 98–107)
COLOR UR AUTO: ABNORMAL
CREAT SERPL-MCNC: 0.72 MG/DL (ref 0.67–1.17)
CRP SERPL-MCNC: 141 MG/L
DEPRECATED HCO3 PLAS-SCNC: 27 MMOL/L (ref 22–29)
ENTEROCOCCUS FAECALIS: DETECTED
ENTEROCOCCUS FAECALIS: NOT DETECTED
ENTEROCOCCUS FAECIUM: NOT DETECTED
ENTEROCOCCUS FAECIUM: NOT DETECTED
ERYTHROCYTE [DISTWIDTH] IN BLOOD BY AUTOMATED COUNT: 17.8 % (ref 10–15)
GFR SERPL CREATININE-BSD FRML MDRD: >90 ML/MIN/1.73M2
GLUCOSE BLDC GLUCOMTR-MCNC: 105 MG/DL (ref 70–99)
GLUCOSE BLDC GLUCOMTR-MCNC: 128 MG/DL (ref 70–99)
GLUCOSE BLDC GLUCOMTR-MCNC: 133 MG/DL (ref 70–99)
GLUCOSE BLDC GLUCOMTR-MCNC: 136 MG/DL (ref 70–99)
GLUCOSE BLDC GLUCOMTR-MCNC: 148 MG/DL (ref 70–99)
GLUCOSE BLDC GLUCOMTR-MCNC: 154 MG/DL (ref 70–99)
GLUCOSE BLDC GLUCOMTR-MCNC: 165 MG/DL (ref 70–99)
GLUCOSE SERPL-MCNC: 155 MG/DL (ref 70–99)
GLUCOSE UR STRIP-MCNC: NEGATIVE MG/DL
HCT VFR BLD AUTO: 29.2 % (ref 40–53)
HGB BLD-MCNC: 8.8 G/DL (ref 13.3–17.7)
HGB BLD-MCNC: 8.8 G/DL (ref 13.3–17.7)
HGB BLD-MCNC: 9.2 G/DL (ref 13.3–17.7)
HGB BLD-MCNC: 9.3 G/DL (ref 13.3–17.7)
HGB UR QL STRIP: ABNORMAL
HYALINE CASTS: 4 /LPF
INR PPP: 2.36 (ref 0.85–1.15)
KETONES UR STRIP-MCNC: NEGATIVE MG/DL
LACTATE SERPL-SCNC: 1.4 MMOL/L (ref 0.7–2)
LEUKOCYTE ESTERASE UR QL STRIP: NEGATIVE
LISTERIA SPECIES (DETECTED/NOT DETECTED): NOT DETECTED
LISTERIA SPECIES (DETECTED/NOT DETECTED): NOT DETECTED
MAGNESIUM SERPL-MCNC: 2 MG/DL (ref 1.7–2.3)
MCH RBC QN AUTO: 28.7 PG (ref 26.5–33)
MCHC RBC AUTO-ENTMCNC: 30.1 G/DL (ref 31.5–36.5)
MCV RBC AUTO: 95 FL (ref 78–100)
MUCOUS THREADS #/AREA URNS LPF: PRESENT /LPF
NITRATE UR QL: NEGATIVE
PH UR STRIP: 7 [PH] (ref 5–7)
PHOSPHATE SERPL-MCNC: 4.6 MG/DL (ref 2.5–4.5)
PLATELET # BLD AUTO: 298 10E3/UL (ref 150–450)
POTASSIUM SERPL-SCNC: 4.2 MMOL/L (ref 3.4–5.3)
PROT SERPL-MCNC: 6.6 G/DL (ref 6.4–8.3)
RBC # BLD AUTO: 3.07 10E6/UL (ref 4.4–5.9)
RBC URINE: 129 /HPF
SODIUM SERPL-SCNC: 133 MMOL/L (ref 136–145)
SP GR UR STRIP: 1.01 (ref 1–1.03)
STAPHYLOCOCCUS AUREUS: NOT DETECTED
STAPHYLOCOCCUS AUREUS: NOT DETECTED
STAPHYLOCOCCUS EPIDERMIDIS: DETECTED
STAPHYLOCOCCUS EPIDERMIDIS: NOT DETECTED
STAPHYLOCOCCUS LUGDUNENSIS: NOT DETECTED
STAPHYLOCOCCUS LUGDUNENSIS: NOT DETECTED
STAPHYLOCOCCUS SPECIES: NOT DETECTED
STREPTOCOCCUS AGALACTIAE: NOT DETECTED
STREPTOCOCCUS AGALACTIAE: NOT DETECTED
STREPTOCOCCUS ANGINOSUS GROUP: NOT DETECTED
STREPTOCOCCUS ANGINOSUS GROUP: NOT DETECTED
STREPTOCOCCUS PNEUMONIAE: NOT DETECTED
STREPTOCOCCUS PNEUMONIAE: NOT DETECTED
STREPTOCOCCUS PYOGENES: NOT DETECTED
STREPTOCOCCUS PYOGENES: NOT DETECTED
STREPTOCOCCUS SPECIES: NOT DETECTED
STREPTOCOCCUS SPECIES: NOT DETECTED
UROBILINOGEN UR STRIP-MCNC: NORMAL MG/DL
WBC # BLD AUTO: 9.5 10E3/UL (ref 4–11)
WBC URINE: 21 /HPF

## 2022-08-03 PROCEDURE — 99223 1ST HOSP IP/OBS HIGH 75: CPT | Performed by: PHYSICIAN ASSISTANT

## 2022-08-03 PROCEDURE — 87070 CULTURE OTHR SPECIMN AEROBIC: CPT | Performed by: PHYSICIAN ASSISTANT

## 2022-08-03 PROCEDURE — 83735 ASSAY OF MAGNESIUM: CPT | Performed by: SURGERY

## 2022-08-03 PROCEDURE — C9113 INJ PANTOPRAZOLE SODIUM, VIA: HCPCS | Performed by: PHYSICIAN ASSISTANT

## 2022-08-03 PROCEDURE — 250N000013 HC RX MED GY IP 250 OP 250 PS 637: Performed by: STUDENT IN AN ORGANIZED HEALTH CARE EDUCATION/TRAINING PROGRAM

## 2022-08-03 PROCEDURE — 250N000011 HC RX IP 250 OP 636: Performed by: PHYSICIAN ASSISTANT

## 2022-08-03 PROCEDURE — 84100 ASSAY OF PHOSPHORUS: CPT | Performed by: SURGERY

## 2022-08-03 PROCEDURE — 999N000248 HC STATISTIC IV INSERT WITH US BY RN

## 2022-08-03 PROCEDURE — 250N000013 HC RX MED GY IP 250 OP 250 PS 637: Performed by: SURGERY

## 2022-08-03 PROCEDURE — 87040 BLOOD CULTURE FOR BACTERIA: CPT | Performed by: PHYSICIAN ASSISTANT

## 2022-08-03 PROCEDURE — 36415 COLL VENOUS BLD VENIPUNCTURE: CPT | Performed by: PHYSICIAN ASSISTANT

## 2022-08-03 PROCEDURE — 85027 COMPLETE CBC AUTOMATED: CPT | Performed by: SURGERY

## 2022-08-03 PROCEDURE — 93325 DOPPLER ECHO COLOR FLOW MAPG: CPT

## 2022-08-03 PROCEDURE — 81001 URINALYSIS AUTO W/SCOPE: CPT | Performed by: PHYSICIAN ASSISTANT

## 2022-08-03 PROCEDURE — 86140 C-REACTIVE PROTEIN: CPT | Performed by: PHYSICIAN ASSISTANT

## 2022-08-03 PROCEDURE — 71045 X-RAY EXAM CHEST 1 VIEW: CPT | Mod: 26 | Performed by: RADIOLOGY

## 2022-08-03 PROCEDURE — 99233 SBSQ HOSP IP/OBS HIGH 50: CPT | Mod: 25 | Performed by: INTERNAL MEDICINE

## 2022-08-03 PROCEDURE — 80053 COMPREHEN METABOLIC PANEL: CPT | Performed by: SURGERY

## 2022-08-03 PROCEDURE — 83605 ASSAY OF LACTIC ACID: CPT | Performed by: SURGERY

## 2022-08-03 PROCEDURE — 93308 TTE F-UP OR LMTD: CPT | Mod: 26 | Performed by: INTERNAL MEDICINE

## 2022-08-03 PROCEDURE — 250N000011 HC RX IP 250 OP 636: Performed by: SURGERY

## 2022-08-03 PROCEDURE — 250N000013 HC RX MED GY IP 250 OP 250 PS 637: Performed by: PHYSICIAN ASSISTANT

## 2022-08-03 PROCEDURE — 36592 COLLECT BLOOD FROM PICC: CPT | Performed by: PHYSICIAN ASSISTANT

## 2022-08-03 PROCEDURE — 250N000011 HC RX IP 250 OP 636: Performed by: THORACIC SURGERY (CARDIOTHORACIC VASCULAR SURGERY)

## 2022-08-03 PROCEDURE — 120N000003 HC R&B IMCU UMMC

## 2022-08-03 PROCEDURE — 250N000013 HC RX MED GY IP 250 OP 250 PS 637: Performed by: THORACIC SURGERY (CARDIOTHORACIC VASCULAR SURGERY)

## 2022-08-03 PROCEDURE — 85610 PROTHROMBIN TIME: CPT | Performed by: SURGERY

## 2022-08-03 PROCEDURE — 85018 HEMOGLOBIN: CPT | Performed by: PHYSICIAN ASSISTANT

## 2022-08-03 PROCEDURE — 258N000003 HC RX IP 258 OP 636: Performed by: THORACIC SURGERY (CARDIOTHORACIC VASCULAR SURGERY)

## 2022-08-03 PROCEDURE — 93325 DOPPLER ECHO COLOR FLOW MAPG: CPT | Mod: 26 | Performed by: INTERNAL MEDICINE

## 2022-08-03 PROCEDURE — 36592 COLLECT BLOOD FROM PICC: CPT | Performed by: SURGERY

## 2022-08-03 PROCEDURE — 71045 X-RAY EXAM CHEST 1 VIEW: CPT

## 2022-08-03 PROCEDURE — 71045 X-RAY EXAM CHEST 1 VIEW: CPT | Mod: 76

## 2022-08-03 PROCEDURE — 93321 DOPPLER ECHO F-UP/LMTD STD: CPT

## 2022-08-03 PROCEDURE — 97535 SELF CARE MNGMENT TRAINING: CPT | Mod: GO

## 2022-08-03 PROCEDURE — 250N000013 HC RX MED GY IP 250 OP 250 PS 637: Performed by: INTERNAL MEDICINE

## 2022-08-03 PROCEDURE — 82330 ASSAY OF CALCIUM: CPT | Performed by: SURGERY

## 2022-08-03 PROCEDURE — 93321 DOPPLER ECHO F-UP/LMTD STD: CPT | Mod: 26 | Performed by: INTERNAL MEDICINE

## 2022-08-03 RX ORDER — MAGNESIUM SULFATE HEPTAHYDRATE 40 MG/ML
2 INJECTION, SOLUTION INTRAVENOUS ONCE
Status: COMPLETED | OUTPATIENT
Start: 2022-08-03 | End: 2022-08-03

## 2022-08-03 RX ORDER — NYSTATIN 100000/ML
1000000 SUSPENSION, ORAL (FINAL DOSE FORM) ORAL 4 TIMES DAILY
Status: DISCONTINUED | OUTPATIENT
Start: 2022-08-03 | End: 2022-08-16

## 2022-08-03 RX ORDER — VANCOMYCIN HYDROCHLORIDE 1 G/200ML
1000 INJECTION, SOLUTION INTRAVENOUS EVERY 12 HOURS
Status: DISCONTINUED | OUTPATIENT
Start: 2022-08-04 | End: 2022-08-04

## 2022-08-03 RX ORDER — QUETIAPINE FUMARATE 25 MG/1
25 TABLET, FILM COATED ORAL 2 TIMES DAILY
Status: DISCONTINUED | OUTPATIENT
Start: 2022-08-04 | End: 2022-08-21 | Stop reason: HOSPADM

## 2022-08-03 RX ORDER — WARFARIN SODIUM 10 MG/1
10 TABLET ORAL
Status: COMPLETED | OUTPATIENT
Start: 2022-08-03 | End: 2022-08-03

## 2022-08-03 RX ORDER — QUETIAPINE FUMARATE 25 MG/1
25 TABLET, FILM COATED ORAL ONCE
Status: COMPLETED | OUTPATIENT
Start: 2022-08-03 | End: 2022-08-03

## 2022-08-03 RX ORDER — QUETIAPINE FUMARATE 50 MG/1
100 TABLET, EXTENDED RELEASE ORAL AT BEDTIME
Status: DISCONTINUED | OUTPATIENT
Start: 2022-08-03 | End: 2022-08-07

## 2022-08-03 RX ADMIN — DIGOXIN 125 MCG: 125 TABLET ORAL at 08:00

## 2022-08-03 RX ADMIN — QUETIAPINE FUMARATE 100 MG: 50 TABLET, EXTENDED RELEASE ORAL at 21:19

## 2022-08-03 RX ADMIN — Medication 1 PACKET: at 14:44

## 2022-08-03 RX ADMIN — HYDROMORPHONE HYDROCHLORIDE 0.2 MG: 0.2 INJECTION, SOLUTION INTRAMUSCULAR; INTRAVENOUS; SUBCUTANEOUS at 09:44

## 2022-08-03 RX ADMIN — SERTRALINE HYDROCHLORIDE 50 MG: 50 TABLET ORAL at 07:59

## 2022-08-03 RX ADMIN — WARFARIN SODIUM 10 MG: 10 TABLET ORAL at 18:40

## 2022-08-03 RX ADMIN — ACETAMINOPHEN 975 MG: 325 TABLET, FILM COATED ORAL at 07:59

## 2022-08-03 RX ADMIN — OXYCODONE HYDROCHLORIDE 5 MG: 5 TABLET ORAL at 14:43

## 2022-08-03 RX ADMIN — OXYCODONE HYDROCHLORIDE 5 MG: 5 TABLET ORAL at 19:50

## 2022-08-03 RX ADMIN — INSULIN ASPART 1 UNITS: 100 INJECTION, SOLUTION INTRAVENOUS; SUBCUTANEOUS at 19:50

## 2022-08-03 RX ADMIN — ONDANSETRON 4 MG: 2 INJECTION INTRAMUSCULAR; INTRAVENOUS at 08:19

## 2022-08-03 RX ADMIN — MAGNESIUM SULFATE IN WATER 2 G: 40 INJECTION, SOLUTION INTRAVENOUS at 09:44

## 2022-08-03 RX ADMIN — OXYCODONE HYDROCHLORIDE 5 MG: 5 TABLET ORAL at 03:47

## 2022-08-03 RX ADMIN — CAPTOPRIL 12.5 MG: 12.5 TABLET ORAL at 19:50

## 2022-08-03 RX ADMIN — ACETAMINOPHEN 975 MG: 325 TABLET, FILM COATED ORAL at 23:22

## 2022-08-03 RX ADMIN — QUETIAPINE FUMARATE 25 MG: 25 TABLET ORAL at 17:10

## 2022-08-03 RX ADMIN — INSULIN ASPART 1 UNITS: 100 INJECTION, SOLUTION INTRAVENOUS; SUBCUTANEOUS at 03:57

## 2022-08-03 RX ADMIN — HYDROMORPHONE HYDROCHLORIDE 0.2 MG: 0.2 INJECTION, SOLUTION INTRAMUSCULAR; INTRAVENOUS; SUBCUTANEOUS at 05:37

## 2022-08-03 RX ADMIN — Medication 1 PACKET: at 08:00

## 2022-08-03 RX ADMIN — OXYCODONE HYDROCHLORIDE 5 MG: 5 TABLET ORAL at 08:00

## 2022-08-03 RX ADMIN — PANTOPRAZOLE SODIUM 40 MG: 40 INJECTION, POWDER, FOR SOLUTION INTRAVENOUS at 07:58

## 2022-08-03 RX ADMIN — ATORVASTATIN CALCIUM 40 MG: 40 TABLET, FILM COATED ORAL at 19:52

## 2022-08-03 RX ADMIN — BUMETANIDE 3 MG: 0.25 INJECTION, SOLUTION INTRAMUSCULAR; INTRAVENOUS at 08:19

## 2022-08-03 RX ADMIN — Medication 1 PACKET: at 19:52

## 2022-08-03 RX ADMIN — VANCOMYCIN HYDROCHLORIDE 1250 MG: 10 INJECTION, POWDER, LYOPHILIZED, FOR SOLUTION INTRAVENOUS at 18:40

## 2022-08-03 RX ADMIN — NYSTATIN 1000000 UNITS: 100000 SUSPENSION ORAL at 19:51

## 2022-08-03 RX ADMIN — INSULIN ASPART 2 UNITS: 100 INJECTION, SOLUTION INTRAVENOUS; SUBCUTANEOUS at 07:58

## 2022-08-03 RX ADMIN — ASPIRIN 81 MG CHEWABLE TABLET 81 MG: 81 TABLET CHEWABLE at 07:59

## 2022-08-03 RX ADMIN — CAPTOPRIL 12.5 MG: 12.5 TABLET ORAL at 14:43

## 2022-08-03 RX ADMIN — NYSTATIN 1000000 UNITS: 100000 SUSPENSION ORAL at 17:19

## 2022-08-03 RX ADMIN — LIDOCAINE PATCH 4% 1 PATCH: 40 PATCH TOPICAL at 07:59

## 2022-08-03 RX ADMIN — QUETIAPINE FUMARATE 25 MG: 25 TABLET ORAL at 08:00

## 2022-08-03 RX ADMIN — PANTOPRAZOLE SODIUM 40 MG: 40 INJECTION, POWDER, FOR SOLUTION INTRAVENOUS at 20:07

## 2022-08-03 RX ADMIN — OLANZAPINE 5 MG: 5 TABLET, FILM COATED ORAL at 19:52

## 2022-08-03 RX ADMIN — FLUTICASONE FUROATE AND VILANTEROL TRIFENATATE 1 PUFF: 100; 25 POWDER RESPIRATORY (INHALATION) at 07:59

## 2022-08-03 RX ADMIN — MULTIVITAMIN 15 ML: LIQUID ORAL at 07:58

## 2022-08-03 RX ADMIN — BUMETANIDE 3 MG: 2 TABLET ORAL at 17:09

## 2022-08-03 RX ADMIN — DIPHENHYDRAMINE HYDROCHLORIDE 25 MG: 25 CAPSULE ORAL at 02:33

## 2022-08-03 RX ADMIN — BUMETANIDE 3 MG: 0.25 INJECTION, SOLUTION INTRAMUSCULAR; INTRAVENOUS at 11:22

## 2022-08-03 RX ADMIN — Medication 10 MG: at 21:19

## 2022-08-03 RX ADMIN — HYDROXYCHLOROQUINE SULFATE 200 MG: 200 TABLET, FILM COATED ORAL at 19:52

## 2022-08-03 RX ADMIN — CAPTOPRIL 12.5 MG: 12.5 TABLET ORAL at 07:59

## 2022-08-03 RX ADMIN — ACETAMINOPHEN 975 MG: 325 TABLET, FILM COATED ORAL at 17:09

## 2022-08-03 RX ADMIN — HYDROXYCHLOROQUINE SULFATE 200 MG: 200 TABLET, FILM COATED ORAL at 08:00

## 2022-08-03 RX ADMIN — QUETIAPINE FUMARATE 50 MG: 50 TABLET ORAL at 23:23

## 2022-08-03 RX ADMIN — OLANZAPINE 5 MG: 5 TABLET, FILM COATED ORAL at 07:59

## 2022-08-03 ASSESSMENT — ACTIVITIES OF DAILY LIVING (ADL)
ADLS_ACUITY_SCORE: 35
ADLS_ACUITY_SCORE: 34
ADLS_ACUITY_SCORE: 37
ADLS_ACUITY_SCORE: 38
ADLS_ACUITY_SCORE: 38
ADLS_ACUITY_SCORE: 37
ADLS_ACUITY_SCORE: 36
ADLS_ACUITY_SCORE: 38
ADLS_ACUITY_SCORE: 35
ADLS_ACUITY_SCORE: 40

## 2022-08-03 NOTE — CONSULTS
"    The patient was seen with the team this morning. Apparently he had a \"terrible night\"; minimal sleep. He denies hallucinations but his son thought that he was confused and \"imagining things\" (delirium).  The 50 mg of Seroquel last night didn't seem to help much. He mentions that he has always had poor sleep, commonly wakes in the middle of the night even with 10 mg melatonin HS which does help put him to sleep (he was was using this dose at home).   He is noted to very anxious first thing in the morning. I don't know yet if Seroquel seems to be helping with anxiety (but it's Tmax is about one hour; absorbed more quickly than Zyprexa, so that may be preferred to Zyprexa).   Is currently NPO due to aspiration concerns, but speech therapy will be coming by today.   If he is cleared for PO meds later you could use Seroquel 25 mg in the morning and 4 pm, and try Seroquel  mg at 8 pm (this may help with mid insomnia and morning anxiety). However if he still needs the tube for meds you cannot use the XR formulation (can't crush). If this is the case then you can use the IR formulation 100 mg at 10 pm.  Since he was using 10 mg melatonin at home you can go back to that dose.     Page me as needed.  Behzad Palomo M.D.   [facility]   Contact information available via [facility] Paging/Directory  If I am not available, then [facility] CL line ([phone]) should know who   Is covering our consult service.     "

## 2022-08-03 NOTE — PHARMACY-VANCOMYCIN DOSING SERVICE
Pharmacy Vancomycin Initial Note  Date of Service August 3, 2022  Patient's  1960  61 year old, male    Indication: Bacteremia    Current estimated CrCl = Estimated Creatinine Clearance: 97.8 mL/min (based on SCr of 0.72 mg/dL).    Creatinine for last 3 days  2022:  7:42 AM Creatinine 0.67 mg/dL  2022:  4:55 AM Creatinine 0.68 mg/dL  8/3/2022:  6:06 AM Creatinine 0.72 mg/dL    Recent Vancomycin Level(s) for last 3 days  No results found for requested labs within last 72 hours.      Vancomycin IV Administrations (past 72 hours)      No vancomycin orders with administrations in past 72 hours.                Nephrotoxins and other renal medications (From now, onward)    Start     Dose/Rate Route Frequency Ordered Stop    22 0530  vancomycin (VANCOCIN) 1000 mg in dextrose 5% 200 mL PREMIX         1,000 mg  200 mL/hr over 1 Hours Intravenous EVERY 12 HOURS 22 1721      22 1730  vancomycin 1250 mg in 0.9% NaCl 250 mL intermittent infusion 1,250 mg         1,250 mg  over 90 Minutes Intravenous ONCE 22 1721      22 1600  bumetanide (BUMEX) tablet 3 mg         3 mg Oral 2 TIMES DAILY (Diuretics and Nitrates) 22 1206      22 1400  captopril (CAPOTEN) tablet 12.5 mg         12.5 mg Oral 3 TIMES DAILY 22 1228            Contrast Orders - past 72 hours (72h ago, onward)    None        [employer] Prediction of Planned Initial Vancomycin Regimen  Loading dose: 1250 mg IV once now  Regimen: 1000 mg IV every 12 hours starting at 0530 22  Start time: 17:30 on 2022  Exposure target: AUC24 (range)400-600 mg/L.hr   AUC24,ss: 514 mg/L.hr  Probability of AUC24 > 400: 76 %  Ctrough,ss: 15.5 mg/L  Probability of Ctrough,ss > 20: 29 %  Probability of nephrotoxicity (Lodise TAD ): 11 %        Plan:  1. Start vancomycin  1250 mg IV once, then given 1000 mg IV q12h.   2. Vancomycin monitoring method: AUC  3. Vancomycin therapeutic monitoring goal: 400-600  mg*h/L  4. Pharmacy will check vancomycin levels as appropriate in 1-3 Days.    5. Serum creatinine levels will be ordered daily for the first week of therapy and at least twice weekly for subsequent weeks.      Saba Dominguez, SaravananD

## 2022-08-03 NOTE — PROVIDER NOTIFICATION
"Provider notified: Surgery Cross Cover - Raj JACOBS    0218- \"T Shavon 6B 6238-2 FYI blood culture from 8/2 collected at 0455 from PICC shows gram positive cocci in pairs and chains. Thanks Shania [id number]\"    0502- \"T Shavon 6B 6238-2 lab did a rapid PCR on venous line blood culture (collected 8/2 at 0445) and it resulted in enterococcus faecalis. Thanks Shania [id number]\"  "

## 2022-08-03 NOTE — PROGRESS NOTES
Cardiology Progress Note    Assessment & Plan   Dandy Sands is a 61 year old male with PMH of lupus, antiphospholipid syndrome, HTN, HLD, HFrEF 2/2 ICM who presented with cardiogenic shock c/b multiorgan failure (JOSE, shock liver) requiring mechanical support with IABP, now s/p HM3 LVAD 7/8/22 by Dr. Zamora with intraoperative course c/b RV failure s/p 29F Protek duo via RIJ. Post op course c/b hemopericardium s/p repeat washout with chest left open. Chest closed 7/13/22 and decannulated from RVAD 7/21    Changes Today:  -Chest tube management per CVTS  -Decrease bumex to 3mg PO BID (changed for you)  -LVAD with acceptable function  -Anticoagulation per CVTS  -Blood culture positive for E faecalis. ID consulted per CVTS     #HFrEF 2/2 ICM s/p HM3 LVAD in setting of cardiogenic shock (SCAI D)  #RV failure s/p Protek duo temporary RVAD s/p decannulation 7/21  #RV thrombus  #Post chest closure hemopericardium s/p repeat washout (7/12)  Patient with ICM s/p HM3 LVAD 7/8/22 by Dr. Zamora in setting of presentation for cardiogenic shock requiring MCS with IABP as prior to HM (initially evaluated for heart transplant, however noted to have substantially elevated DSAs during course of care, so decision made to proceed with LVAD given patient already on temporary MCS). Intraoperative course c/b RV failure resulting in cardiogenic shock requiring high dose pressors necessitating RVAD support. Initial post-op course c/b hemopericardium requiring repeat washout with chest left open, however has since recovered well with decannulation on 7/21 with extubation day prior. Has continued to tolerate well from hemodynamic and end organ standpoint. Continuing to work on pulmonary toilet and diuresis to improve his oxygenation and total body fluid status.   -LVAD: continues to have good flows, no alarms and stable end organ perfusion; continue speed at 5300  -continue digoxin for RV support  -AC, antiplatelets: continue ASA; heparin  "high intensity on hold given epistaxis/bleeding concerns, defer to surgery when it would be appropriate to restart; On warfarin  -Volume status: Decrease bumex to 3mg PO BID  -rhythm: sinus              -of note, patient with climbing capture threshold of RV lead; will need to be evaluated in future by EP team  -blood pressure: Continue captopril to 12.5mg TID, may increase if MAPs remain elevated  -encourage ICS, pulmonary toilet  -delirium precautions     The patient's care to be discussed with the Attending Physician, Dr. Faustin, Bedside Nurse and Primary team    Chris Lawrence MD  Cardiology Fellow      Interval History   Reviewed events from last 24 hours.  States that he is still having difficulty breathing, Satting >90s on room air.      Physical Exam   Temp: 97.1  F (36.2  C) Temp src: Axillary BP: (!) 108/93 Pulse: 112   Resp: 29 SpO2: 99 % O2 Device: None (Room air) Oxygen Delivery: 2 LPM  Vitals:    08/01/22 0618 08/02/22 0206 08/03/22 0615   Weight: 67.8 kg (149 lb 7.6 oz) 68.5 kg (151 lb 0.2 oz) 68.4 kg (150 lb 12.7 oz)     Vital Signs with Ranges  Temp:  [97.1  F (36.2  C)-98.8  F (37.1  C)] 97.1  F (36.2  C)  Pulse:  [105-113] 112  Resp:  [18-29] 29  BP: ()/(80-93) 108/93  SpO2:  [87 %-100 %] 99 %  I/O last 3 completed shifts:  In: 2035 [P.O.:560; I.V.:20; NG/GT:330]  Out: 4145 [Urine:3925; Stool:100; Chest Tube:120]     , Blood pressure (!) 108/93, pulse 112, temperature 97.1  F (36.2  C), temperature source Axillary, resp. rate 29, height 1.702 m (5' 7\"), weight 68.4 kg (150 lb 12.7 oz), SpO2 99 %.  150 lbs 12.71 oz  General Appearance:  Older male in NAD   Respiratory: coarse lung sounds bilaterally  Cardiovascular: vad hum, driveline site c/d/i, chest incisions c/d/i  GI: soft, nt, bs active  Skin: warm, well perfused, trace diffuse edema  Neuro: awake, alert, interactive, speech fluent, moving all 4 extremities    Medications     dextrose         acetaminophen  975 mg Oral or Feeding Tube " Q8H [facility]     aspirin  81 mg Oral or Feeding Tube Daily     atorvastatin  40 mg Oral QPM     bumetanide  3 mg Intravenous TID     captopril  12.5 mg Oral TID     digoxin  125 mcg Oral Daily     fiber modular (NUTRISOURCE FIBER)  1 packet Per Feeding Tube TID     fluticasone-vilanterol  1 puff Inhalation Daily     hydroxychloroquine  200 mg Oral BID     insulin aspart  1-12 Units Subcutaneous Q4H     [Held by provider] insulin glargine  20 Units Subcutaneous QAM AC     lidocaine  1 patch Transdermal Q24h    And     lidocaine   Transdermal Q8H [facility]     multivitamins w/minerals  15 mL Per Feeding Tube Daily     OLANZapine  5 mg Oral BID     oxidized cellulose   Topical Once     pantoprazole (PROTONIX) IV  40 mg Intravenous BID     potassium chloride  40 mEq Oral BID     protein modular  1 packet Per Feeding Tube TID     QUEtiapine  25 mg Oral Q8H     QUEtiapine  50 mg Oral At Bedtime     sertraline  50 mg Oral Daily     warfarin ANTICOAGULANT  10 mg Oral ONCE at 18:00     Warfarin Therapy Reminder  1 each Oral See Admin Instructions       Data   Recent Labs   Lab 08/03/22  0724 08/03/22  0606 08/03/22  0351 08/03/22  0001 08/02/22  2054 08/02/22  1516 08/02/22  1319 08/02/22  0743 08/02/22  0455 08/01/22  0743 08/01/22  0742   WBC  --  9.5  --   --   --   --   --   --  9.0  --  10.6   HGB  --  8.8*  8.8*  --   --  9.3*  --  9.7*  --  9.0*  9.0*   < > 9.1*  9.1*   MCV  --  95  --   --   --   --   --   --  95  --  95   PLT  --  298  --   --   --   --   --   --  289  --  406   INR  --  2.36*  --   --   --   --   --   --  2.08*  --  1.87*   NA  --  133*  --   --   --   --   --   --  132*  --  131*   POTASSIUM  --  4.2  --   --   --   --   --   --  4.0  --  4.1   CHLORIDE  --  94*  --   --   --   --   --   --  94*  --  93*   CO2  --  27  --   --   --   --   --   --  30*  --  28   BUN  --  22.6  --   --   --   --   --   --  21.2  --  21.6   CR  --  0.72  --   --   --   --   --   --  0.68  --  0.67   ANIONGAP  --  12  --   --    --   --   --   --  8  --  10   ELDA  --  8.7*  --   --   --   --   --   --  8.8  --  9.0   * 155* 148*   < >  --    < >  --    < > 144*   < > 148*   ALBUMIN  --  2.9*  --   --   --   --   --   --  2.9*  --  3.0*   PROTTOTAL  --  6.6  --   --   --   --   --   --  6.6  --  6.7   BILITOTAL  --  0.4  --   --   --   --   --   --  0.4  --  0.4   ALKPHOS  --  215*  --   --   --   --   --   --  215*  --  205*   ALT  --  15  --   --   --   --   --   --  13  --  14   AST  --  37  --   --   --   --   --   --  30  --  33    < > = values in this interval not displayed.       Recent Results (from the past 24 hour(s))   XR Chest Port 1 View    Narrative    XR CHEST PORT 1 VIEW  8/2/2022 6:16 PM      HISTORY: interval change    COMPARISON: Chest x-ray 8/1/2022, 7/31/2022.    FINDINGS:   Portable AP 45 degree upright chest x-ray. Postsurgical changes in the  chest. Median sternotomy wires are intact. Right chest tube in stable  position. LVAD. Left chest wall ICD with leads terminating over the  right atrium and left ventricle. Right arm PICC terminates over the  low SVC. Enteric tube courses below the margin of the film.    Trachea is midline. Cardiac silhouette is enlarged. Pulmonary  vasculature is distinct. Stable bilateral perihilar interstitial  opacities. Small bilateral pleural effusions are unchanged.       Impression    IMPRESSION:     1. Decrease in right-sided pneumothorax with chest tube in place.  2. Relatively unchanged pulmonary opacities and  small pleural  effusions.  3. Support devices in stable position.    I have personally reviewed the examination and initial interpretation  and I agree with the findings.    JILL CARRANZA MD         SYSTEM ID:  [id number]

## 2022-08-03 NOTE — CONSULTS
[facility]EAS[facility] CONSULTATION     Patient:  Dandy Sands   Date of birth 1960, Medical record number [id number]  Date of Visit:  08/03/2022  Date of Admission: 6/17/2022  Consult Requester:Darius Zamora, *          Assessment and Recommendations:   ASSESSMENT:  1. E.faecalis on culture from PICC line (8/2/22)  2. S/p HM3 LVAD (7/8/22)  3. HFrEF due to ICM  4. SLE on hydroxychloroquine    DISCUSSION:   Dandy Sands is a 61 year old male with history of SLE, antiphospholipid antibody syndrome on warfarin, CAD, HFrEF due to ICM, severe MR, who transferred to [facility] from [facility] on 6/17/22 with acute decompensated heart failure and cardiogenic shock. Recent hospitalization (5/16-5/26) for CAD with ischemic lesions and mitral insufficiency. Briefly, had IABP placed 6/23/22, HeartMate 3 LVAD placed 7/8/22, course c/b need for RVAD (7/8-7/11), pericardial tamponade.  Previous sputum with E.faecalis (pan-S), E.coli (R: ampicillin, Unasyn, ceftazidime, ceftriaxone, Bactrim). Has had daily blood cultures x2 sets since 7/9/22. Culture from PICC line on 8/2 with GPC in pairs and chains on day #1, E.faecalis per verigene; all other blood cultures are NGTD. Afebrile, LCGp93-70p, tachypneic with sats 90-99% on RA. WBC is WNL at 9.5K, CRP elevated at 141, however no recent baseline available. CXR obtained on 8/2 due to tachypnea, no infiltrate to suggest pneumonia at this time.    Since positive culture came from PICC, recommend pulling the line. Possibility of line colonization vs true bacteremia, awaiting additional cultures. Start vancomycin. At this time, does not need cardiac imaging related to positive blood culture as there is a very clearly established onset of positivity.       RECOMMENDATION:  1. Start vancomycin  2. Pull PICC line  3. BCx on 8/4 x2 sets  4. If no additional positive BCx can stop daily collection after 8/4  5. Does not need cardiac imaging related to positive  blood culture at this time    Thank you for this consult. ID will continue to follow.     Patient was discussed with Dr. Ingram.     Grazyna Millan PA-C  Infectious Disease  Pager # [phone]  ________________________________________________________________    Consult Question: positive blood culture.  Admission Diagnosis: Cardiogenic shock (H) [R57.0]         History of Present Illness:   Dandy Sands is a 61 year old male with history of SLE, antiphospholipid antibody syndrome on warfarin, CAD, HFrEF due to ICM, severe MR, who transferred to [facility] from [facility] on 6/17/22 with acute decompensated heart failure and cardiogenic shock. Recent hospitalization (5/16-5/26) for CAD with ischemic lesions and mitral insufficiency. Briefly, had IABP placed 6/23/22, HeartMate 3 LVAD placed 7/8/22, course c/b need for RVAD (7/8-7/11), pericardial tamponade. ID has been consulted on 8/3/22 due to positive blood culture from PICC line. Mr. Sands is alert and interactive but mildly confused today. He reports that he is having trouble breathing, denies cough, sputum production. Having diarrhea x2/day, reports nausea without vomiting. No skin wounds, joint pain.       Antimicrobials  Current:  - None    Prior:  - Zosyn (7/25-7/26; 7/15-7/22)  - Vancomycin (7/25; 7/8-7/14)  - cefepime (7/8-7/14)  - fluconazole (7/8-7/10)  - Rifampin (7/9-7/10)  - levofloxacin (7/8)         Review of Systems:   CONSTITUTIONAL:  No fevers or chills, sweats  EYES: negative for icterus  ENT:  negative for sore throat  RESPIRATORY:  +shortness of breath. negative for cough with sputum   CARDIOVASCULAR:  negative for chest pain  GASTROINTESTINAL: +nausea, loose stools. Negative for vomiting.  GENITOURINARY:  negative for dysuria  MUSCULOSKELETAL: negative for joint pain or swelling  INTEGUMENT:  negative for rash and pruritus  NEURO:  Negative for headache         Past Medical History:     Past Medical History:   Diagnosis Date     Antiphospholipid  antibody syndrome (H)      CAD (coronary artery disease)      Chronic systolic heart failure (H)      Ischemic cardiomyopathy      Mitral regurgitation      Systemic lupus erythematosus (H)             Past Surgical History:     Past Surgical History:   Procedure Laterality Date     COLONOSCOPY N/A 05/23/2022    Procedure: COLONOSCOPY;  Surgeon: Parth Meneses MD;  Location: [facility] OR     CV CORONARY ANGIOGRAM N/A 5/24/2022    Procedure: Coronary Angiogram;  Surgeon: Mike Pope MD;  Location: [facility]     CV CORONARY ANGIOGRAM N/A 5/29/2022    Procedure: Coronary Angiogram;  Surgeon: Austin Bright MD;  Location: [facility]     CV CORONARY LITHOTRIPSY PCI Bilateral 5/24/2022    Procedure: Percutaneous Coronary Intervention - Lithotripsy;  Surgeon: Mike Pope MD;  Location: [facility]     CV INTRA AORTIC BALLOON N/A 6/17/2022    Procedure: Intraprocedure Aortic Balloon Pump Insertion;  Surgeon: Sherman Cerda MD;  Location: [facility]     CV INTRA AORTIC BALLOON Right 7/3/2022    Procedure: Reposition of Subclavian Intraprocedure Aortic Balloon Pump;  Surgeon: Austin Bright MD;  Location[facility]     CV INTRAVASULAR ULTRASOUND N/A 5/24/2022    Procedure: Intravascular Ultrasound;  Surgeon: Mike Pope MD;  Location: [facility]     CV PCI ANGIOPLASTY N/A 5/29/2022    Procedure: Percutaneous Transluminal Angioplasty;  Surgeon: Austin Bright MD;  Location: [facility]     CV PCI STENT DRUG ELUTING N/A 5/24/2022    Procedure: Percutaneous Coronary Intervention Stent;  Surgeon: Mike Pope MD;  Location: [facility]     CV RIGHT HEART CATH MEASUREMENTS RECORDED N/A 05/18/2022    Procedure: Right Heart Catheterization;  Surgeon: Justo Stewart MD;  [facility]     CV RIGHT HEART CATH MEASUREMENTS RECORDED N/A 5/24/2022     Procedure: Right Heart Catheterization;  Surgeon: Mike Pope MD;  Location:  HEART CARDIAC CATH LAB     CV RIGHT HEART CATH MEASUREMENTS RECORDED N/A 5/29/2022    Procedure: Right Heart Catheterization;  Surgeon: Austin Bright MD;  Location:  HEART CARDIAC CATH LAB     CV RIGHT HEART CATH MEASUREMENTS RECORDED N/A 7/1/2022    Procedure: Right Heart Catheterization;  Surgeon: Mike Pope MD;  Location:  HEART CARDIAC CATH LAB     CV RIGHT HEART EXERCISE STRESS STUDY N/A 05/18/2022    Procedure: Stress Drug Study;  Surgeon: Justo Stewart MD;  Location:  HEART CARDIAC CATH LAB     CV SWAN CHOCO PROCEDURE N/A 6/17/2022    Procedure: [place] Procedure;  Surgeon: Sherman Cerda MD;  Location:  HEART CARDIAC CATH LAB     INCISION AND DRAINAGE CHEST WASHOUT, COMBINED N/A 7/11/2022    Procedure: Chest washout and closure;  Surgeon: Darnell Morgan MD;  Location: [facility]     INCISION AND DRAINAGE CHEST WASHOUT, COMBINED N/A 7/12/2022    Procedure: INCISION AND DRAINAGE, WOUND, CHEST, WITH IRRIGATION;  Surgeon: Darius Zamora MD;  Location: [facility] OR     INSERT ARTERIAL LINE  7/18/2022          INSERT INTRAAORTIC BALLOON PUMP Right 6/23/2022    Procedure: INSERTION, INTRA-AORTIC BALLOON PUMP RIGHT SUBCLAVIAN ARTERY.;  Surgeon: Hayden Veras MD;  Location: [facility] OR     INSERT VENTRICULAR ASSIST DEVICE LEFT (HEARTMATE II/III) MINIMALLY INVASIVE N/A 7/8/2022    Procedure: MEDIAN STERNOTOMY.  CARDIOPULMONARY BYPASS.  INTRAOPERATIVE TRANSESOPHAGEAL ECHOCARDIOGRAM.  IMPLANT LEFT VENTRICULAR ASSIST DEVICE HEARTMATE III.  PLACEMENT OF PERCUTANEOUS RIGHT VENTRICULAR ASSIST DEVICE.  TEMPORARY CHEST CLOSURE;  Surgeon: Darius Zamora MD;  Location: [facility] OR     IR CHEST TUBE PLACEMENT NON-TUNNELED RIGHT  8/1/2022     IRRIGATION AND DEBRIDEMENT CHEST WASHOUT, COMBINED N/A 7/13/2022    Procedure: IRRIGATION AND DEBRIDEMENT, CHEST;  Surgeon: Darius Zamora MD;  Location: [facility] OR     PICC  DOUBLE LUMEN PLACEMENT Right 05/28/2022    right basilic 5 fr dl picc 40 cm            Family History:   Reviewed and non-contributory.   No family history on file.         Social History:     Social History     Tobacco Use     Smoking status: Not on file     Smokeless tobacco: Not on file   Substance Use Topics     Alcohol use: Not on file     History   Sexual Activity     Sexual activity: Not on file            Current Medications:       acetaminophen  975 mg Oral or Feeding Tube Q8H [facility]     aspirin  81 mg Oral or Feeding Tube Daily     atorvastatin  40 mg Oral QPM     bumetanide  3 mg Oral BID     captopril  12.5 mg Oral TID     digoxin  125 mcg Oral Daily     fiber modular (NUTRISOURCE FIBER)  1 packet Per Feeding Tube TID     fluticasone-vilanterol  1 puff Inhalation Daily     hydroxychloroquine  200 mg Oral BID     insulin aspart  1-12 Units Subcutaneous Q4H     [Held by provider] insulin glargine  20 Units Subcutaneous QAM AC     lidocaine  1 patch Transdermal Q24h    And     lidocaine   Transdermal Q8H [facility]     melatonin  10 mg Oral At Bedtime     multivitamins w/minerals  15 mL Per Feeding Tube Daily     OLANZapine  5 mg Oral BID     oxidized cellulose   Topical Once     pantoprazole (PROTONIX) IV  40 mg Intravenous BID     protein modular  1 packet Per Feeding Tube TID     QUEtiapine  25 mg Oral Q8H     QUEtiapine  50 mg Oral At Bedtime     sertraline  50 mg Oral Daily     warfarin ANTICOAGULANT  10 mg Oral ONCE at 18:00     Warfarin Therapy Reminder  1 each Oral See Admin Instructions            Allergies:   No Known Allergies         Physical Exam:   Vitals were reviewed  Patient Vitals for the past 24 hrs:   BP Temp Temp src Pulse Resp SpO2 Weight   08/03/22 1525 (!) 73/61 -- -- -- -- -- --   08/03/22 1523 (!) 87/32 98.7  F (37.1  C) Axillary 112 24 96 % --   08/03/22 1118 100/81 97  F (36.1  C) Tympanic 105 24 94 % --   08/03/22 1104 -- -- -- -- -- -- 63.4 kg (139 lb 12.4 oz)   08/03/22 0716 (!) 108/93  97.1  F (36.2  C) Axillary 112 29 99 % --   08/03/22 0615 -- -- -- -- -- -- 68.4 kg (150 lb 12.7 oz)   08/03/22 0500 -- -- -- 106 -- 98 % --   08/03/22 0400 -- 97.7  F (36.5  C) Axillary 105 20 -- --   08/03/22 0300 -- -- -- 108 -- 98 % --   08/03/22 0200 -- -- -- 109 -- (!) 87 % --   08/03/22 0100 -- -- -- 110 -- 99 % --   08/03/22 0004 -- 98.1  F (36.7  C) Axillary 112 20 99 % --   08/03/22 0000 -- -- -- 110 -- 98 % --   08/02/22 2300 -- -- -- 109 -- 100 % --   08/02/22 2200 -- -- -- 110 -- 99 % --   08/02/22 2136 -- -- -- 110 22 98 % --   08/02/22 2000 -- -- -- 111 -- 94 % --   08/02/22 1940 -- -- -- 112 20 94 % --   08/02/22 1600 -- 98.8  F (37.1  C) Oral 113 22 95 % --       Physical Examination:  GENERAL:  Awake, alert, interactive. Tachypneic. Propped up in bed.   HEENT:  Head is normocephalic, atraumatic   EYES:  Eyes have anicteric sclerae without conjunctival injection.    ENT:  Oropharynx is moist without exudates or ulcers. +NGT (clamped)  NECK:  Supple. +suture at old R neck access site.  LUNGS:  Clear to auscultation bilateral. No wheeze or rales.  CARDIOVASCULAR:  +LVAD hum. No peripheral edema.  ABDOMEN:  Normal bowel sounds, soft, nontender. +driveline with dressing in place.  SKIN:  No acute rashes.  PICC line in place without any surrounding erythema or exudate. No stigmata of endocarditis.  NEUROLOGIC:  Active x4 extremities         Laboratory Data:     Inflammatory Markers    Recent Labs   Lab Test 08/03/22  0606 06/19/22  1522   SED  --  39*   .00* 28.0*       Hematology Studies    Recent Labs   Lab Test 08/03/22  1154 08/03/22  0606 08/02/22  2054 08/02/22  1319 08/02/22  0455 08/01/22  1945 08/01/22  1344 08/01/22  0742 07/31/22  2340 07/31/22  0712 07/30/22  2330 07/30/22  1009 07/29/22  0525 07/20/22  1523 07/20/22  0949 07/20/22  0355 07/19/22  2049 07/19/22  1601 07/19/22  0946 07/19/22  0322 07/18/22  2118   WBC  --  9.5  --   --  9.0  --   --  10.6  --  10.0  --  11.7* 11.3*   <  > 15.9* 13.9* 22.4*   < > 18.5* 15.5* 19.0*   ANEU  --   --   --   --   --   --   --   --   --   --   --   --   --   --  14.6* 11.8* 19.5*  --  16.3* 12.2* 17.5*   AEOS  --   --   --   --   --   --   --   --   --   --   --   --   --   --  0.5 0.1 0.4  --  0.2 0.3 0.4   HGB 9.2* 8.8*  8.8* 9.3* 9.7* 9.0*  9.0* 9.3*   < > 9.1*  9.1*   < > 9.2*   < > 6.8* 8.4*   < > 8.5* 8.1* 8.2*   < > 8.3* 7.0* 7.6*   MCV  --  95  --   --  95  --   --  95  --  93  --  95 95   < > 90 90 90   < > 89 92 91   PLT  --  298  --   --  289  --   --  406  --  335  --  439 366   < > 150 157 200   < > 164 155 179    < > = values in this interval not displayed.       Metabolic Studies     Recent Labs   Lab Test 08/03/22 0606 08/02/22 0455 08/01/22 0742 07/31/22  0712 07/30/22  1009   * 132* 131* 131* 133*   POTASSIUM 4.2 4.0 4.1 4.1 4.3   CHLORIDE 94* 94* 93* 93* 94*   CO2 27 30* 28 28 29   BUN 22.6 21.2 21.6 25.0* 20.7   CR 0.72 0.68 0.67 0.67 0.68   GFRESTIMATED >90 >90 >90 >90 >90       Hepatic Studies    Recent Labs   Lab Test 08/03/22 0606 08/02/22 0455 08/01/22 0742 07/31/22  0712 07/30/22  1009 07/29/22  0525   BILITOTAL 0.4 0.4 0.4 0.5 0.5 0.5   ALKPHOS 215* 215* 205* 221* 195* 230*   ALBUMIN 2.9* 2.9* 3.0* 2.9* 2.8* 2.9*   AST 37 30 33 32 34 34   ALT 15 13 14 14 11 13       Microbiology:  Culture   Date Value Ref Range Status   08/02/2022 No growth after 12 hours  Preliminary   08/02/2022 No growth after 12 hours  Preliminary   08/02/2022 Positive on the 1st day of incubation (A)  Preliminary   08/02/2022 Gram positive cocci in pairs and chains (AA)  Preliminary     Comment:     1 of 2 bottles   08/01/2022 No growth after 2 days  Preliminary   07/31/2022 No growth after 3 days  Preliminary   07/30/2022 No growth after 4 days  Preliminary   07/29/2022 No Growth  Final   07/28/2022 No Growth  Final   07/28/2022 No anaerobic organisms isolated after 5 days  Preliminary   07/28/2022 No Growth  Final   07/27/2022 No Growth   Final   07/25/2022 No Growth  Final   07/25/2022 No Growth  Final   07/25/2022 No Growth  Final   07/24/2022 No Growth  Final   07/23/2022 No Growth  Final   07/22/2022 No Growth  Final   07/21/2022 No Growth  Final   07/21/2022 No Growth  Final   07/21/2022 No Growth  Final   07/20/2022 No Growth  Final   07/19/2022 No Growth  Final   07/18/2022 No Growth  Final   07/17/2022 No Growth  Final   07/16/2022 No Growth  Final   07/15/2022 No Growth  Final   07/13/2022 No Growth  Final   07/13/2022 No Growth  Final   07/13/2022 No Growth  Final   07/12/2022 No Growth  Final   07/12/2022 4+ Candida albicans (A)  Final     Comment:     Susceptibilities not routinely done   07/12/2022 1+ Escherichia coli (A)  Final   07/12/2022 3+ Enterococcus faecalis (A)  Final   07/12/2022 No Growth  Final   07/12/2022 No Growth  Final   07/11/2022 No Growth  Final   07/10/2022 No Growth  Final   07/09/2022 No Growth  Final   05/19/2022 No Growth  Final       Urine Studies    Recent Labs   Lab Test 08/03/22  1110 07/21/22  2239 07/12/22  1045 05/19/22  1810 05/16/22  1706   LEUKEST Negative Negative Negative Negative Negative   WBCU 21* 1 7* 0 <1       Vancomycin Levels    Recent Labs   Lab Test 07/10/22  1023   VANCOMYCIN 12.8       Hepatitis B Testing   Recent Labs   Lab Test 05/20/22  0516   HBCAB Nonreactive   HEPBANG Nonreactive     Hepatitis C Testing     Hepatitis C Antibody   Date Value Ref Range Status   05/20/2022 Nonreactive Nonreactive Final     Respiratory Virus Testing    No results found for: RS, FLUAG        Imaging:     CXR (8/3/2022)  FINDINGS: Heart borderline enlarged. LVAD. Implantable cardiac  defibrillator. Right basilar chest catheter. Right PICC tip in the  proximal SVC. Median sternotomy. Feeding tube beyond the inferior  margin of the image. Previous right apical pneumothorax less  conspicuous.                                                        IMPRESSION: Slight decrease in conspicuity of right  apical  pneumothorax, otherwise grossly stable.    CTA Chest/Abd/ Pelvis (7/12/22)  IMPRESSION:  1. Moderate to large hemopericardium centered predominantly about the  LVAD outflow tract without focal contrast extravasation identified.   2. Cardiomegaly with expected postoperative changes of recent  sternotomy closure.  3. Mild pulmonary edema with moderate right and small left pleural  effusions and associated atelectasis.  4. Tiny left pneumothorax with chest tube in place.  5. Probable small splenic infarct.  6. Additional incidental/chronic findings as noted above.

## 2022-08-03 NOTE — PROGRESS NOTES
CVTS PROGRESS NOTE  08/03/2022      ASSESSMENT Dandy Sands is a 61 year old male with a PMH of CAD s/p PCI to LAD, MI, ICM, acute on chronic heart failure, s/p CRT-D, severe MR, antiphospholipid antibody syndrome on chronic warfarin, SLE, HTN, HLD, recent hospitalization 5/16-5/26 s/p RCA, LAD stenting who underwent RVAD (29F Protek duo RIJ) LVAD placement (HeartMate 3) on 7/8/22 by Dr. Zamora. Intraoperative course complicated by IABP dysfunction and RV failure, requiring crash onto bypass / vasopressor support, NO, and protek placement. Now s/p chest closure and NO wean 7/11. Return to OR for hemopericardium (7/12) and chest re-closure 7/13. Protek RVAD removed 7/21.    PLAN:   Neuro/ pain/ sedation:  #Acute Postoperative pain  #Depression   #Anxiety due to a medical condition  #Insomnia  #Delerium   Delirious intermittently; patient is not sleeping at all at night and this is likely contributing significantly to his delirium and confusion. He is also very anxious at night and early in the morning.   - Monitor neurological status. Notify the MD for any acute changes in exam.  - Pain:    -Scheduled tylenol. Scheduled oxycodone 5mg q8h discontinued on 8/2   -PRN oxycodone 5mg q4h, dilaudid 0.2 mg q 2 hours PRN  - Depression: Zoloft 50 mg started on 8/2   - Duloxetine 30 mg discontinued on 8/2  - Sleep: Melatonin 10 mg HS, benadryl 25 mg HS PRN, Seroquel 25 mg in AM and 4 pm and Seroquel  mg at 8 pm (if cleared for PO meds per speech therapy) If not, can use seroquel IR formulation 100 mg at 10 pm. Added as of psych consult on 8/3   - Trazodone discontinued on 8/2   - Zyprexa 5mg BID   - Anxiety: Valproate po 250 mg BID and 500mg Bedtime    - Hydroxyzine PRN discontinued on 8/2 due to anticholinergic effects               - Ativan 0.5-1.0mg IV q6h PRN added for anxiety, discontinued on 8/2 for worsening confusion  - PTA meds: hydroxyzine 25mg PRN, zolpidem 5mg, melatonin 10mg, lorazepam 0.5mg  - QTc(7/18)-  683 msec, no haldol  - Recent fall on 7/29 and increased delirium over weekend; CT scan of head on 8/1 did not show any acute intracranial pathologies, ABG was also not significant   - Continue sitter       Pulmonary:   #Acute hypoxic respiratory failure on iMV  #Mild-moderate emphysema  #History of COVID-19  - Saturating well on room air as of 8/3; was previously on nasal cannula 2 LPM  - Maintain SpO2> 92%  - Incentive spirometry   - Right chest tube placed by IR via ultrasound on 8/1 for moderate pleural effusion; chest x-ray post-procedure showed a small right pneumothorax, which has improved as of chest x-ray on 8/3.   -Right chest tube removed on 8/3 with no complications. Obtained a chest x-ray pre and post removal.     Cardiovascular:    #S/p LVAD placement (HeartMate 3) on 7/8/22   #S/p RVAD (29F Protek duo RIJ) on 7/8/22  #S/p chest closure 7/11  #Cardiogenic shock  #Advanced ischemic cardiomyopathy, class IV, stage D  #CAD s/p PCI to LAD (2005)  #S/p CRT-D (2006)  #History of MI (2007)  #Severe MR  #Antiphospholipid antibody syndrome on chronic warfarin  #HTN, HLD  #Recent hospitalization 5/16-5/26 s/p RCA, LAD stenting  #Atrial fibrillation   # RV mobile clot   - Monitor hemodynamic status. Chest closure and oxygenator splicing 7/11. Reopened 7/12 for I&D and left open, closed 7/13. RVAD removed 7/21.  - Goal MAP > 65   - Pulse continues to be in 100's; underlying rhythm is sinus; continue to monitor   - PTA meds(held): clopidogrel 75mg, lasix 40mg, warfarin 5mg  - LVAD management per Advanced HF service  - AC Plan: High intensity Heparin for RV mobile thrombus  - Aspirin 81 mg  - Atorvastatin 40mg   - Hydralazine 10-20 mg q 4 hours PRN for MAP >90  - Captopril 6.25mg TID dose titration per cardiology   - Warfarin 7/24 per pharmacy  -Echocardiogram ordered on 8/2 for status of RV clot due to persistent shortness of breath-no thrombus in the right ventricle     GI / Nutrition:   #GERD  #Congestive  hepatopathy in the setting of cardiac insuffiencey - Resolved  #Hematemesis / oral mucosal bleeding -resolved    - Full liquid diet, SLP following  - Continue NJT at goal   - Nutrition following  - Bowel regimen prn (miralax / senna)  - Trend LFT's every other day  - GI consulted 7/31 for black tarry stools and a possible GI bleed, however it is more likely swallowed blood from epistaxis that patient previously had. GI does not recommend an upper endoscopy at this time. Continue IV PPI BID.  - 8/3 Patient reported trouble swallowing yesterday evening; speech therapy consult ordered on 8/3. Will place patient NPO and cycle tube feeds at 75 ml/hr with 30 ml FWF Q 4hrs from 6pm - 10 am. Can get med's with close supervision with nurse. Speech to reassess in AM. Thrush also noted on exam today.     Renal/ Fluid Balance:    - Strict I/O, daily weights   - Avoid/limit nephrotoxins as able  - Replete lytes PRN per protocol  - PTA meds: tamsulosin 0.4mg (held)  - Goal net negative ~ 1L   Bumex gtt for part of 7/24: total UOP 8L, gtt stopped   Bumex 4mg x2 7/25 with over 6L UOP  - Bumex 3mg x3 7/27 and 7/28 with good diuresis, continue bumex 3 mg IV increased to TID on 7/31 given SOB.   -8/3 Decrease bumex 3 mg IV TID to bumex 3 mg PO BID per cardiology with no potassium supplementation due to normal levels, marcelo removed today with no complications with UA ordered    Endocrine:    #Stress induced hyperglycemia  Preop A1c 5.5%  - Insulin glargine 20U and high sliding scale  - Goal BG <180 for optimal healing   - Holding glargine 7/29 in anticipation of possibly cycling tube feeding, has had minimal insulin requirements 7/29 with TF running. Will continue to hold and plan to start cycle feeds 7/30. Restarted NPH due to elevated BG's today in the 140's on 8/2; will continue this regimen as of 8/3    ID/ Antibiotics:  #Periopearive antibiosis (s/p course of Cefepime, vancomycin, rifampin, fluconazole)  #HAP - Enterococcus  faecalis PNA, s/p treatment   - 7/12 Pan culture: Bcx x2, UA/UC -> negative  - 7/14 Endotracheal sputum culture (4+ candida albicans, 1+ E.Coli, 3+ enterococcus faecalis)   - Zosyn for HAP(7/15-7/22)  - 7/18 C.diff- negative  - 7/25 Blood cultures drawn for unexplained hypotension   - NGTD  - 8/3 PICC line culture on 8/2 grew gram positive cocci in pairs and chairs; ID consulted and recommeds removal  - Nystatin swish and swallow for thrush 8/3     Heme:     #Acute blood loss anemia  #Acute blood loss thrombocytopenia  #History of DVT and PE   #Antiphospholipid antibody syndrome  #History of iron deficiency anemia  - Monitor for s/sx of bleeding  - Trend CBC and coags  - Hgb goal >8   Hgb stable in 8-9's, no signs or symptoms of bleeding   - Plt goal > 20  - History of nose bleed on 7/30, lost 2 units of blood. Transfused for hgb 6.8, 2U received. Recheck ordered and has been stable in 8-9's; will continue to monitor daily   - Melena reported on 7/30 - 3 stools over night, see GI above, stools have been brown in color as of 8/2  - Continue to dose coumadin, hold heparin gtt as INR trending up and recent bleed requiring transfusion     Rheumatology:  #SLE  - Chronic: symptoms include arthritis, photosensitivity, fatigue, and skin manifestations  - PTA meds: hydroxychloroquine 200mg, resumed 7/16  - Rheumatology consulted and following. Follow dsDNA and complement levels(7/18)   - Will need cellcept restarted when appropriate    Prophylaxis:    - DVT: SCD + high intensity heparin-held for bleeding + warfarin   - PPI    Disposition:  - Transfer to  on 7/28    LENA Sebastian     Discussed with Dr. Zamora    Patient seen with me and I agree with plan.     Yas Gilman PA-C  Cardiothoracic Surgery  Pager: [phone]    ----------------------------------------------------------------    Interval Events:    No overnight events. Patient states he did not sleep at all last night and is feeling very tired  this morning and anxious. He also notes still being short of breath at times. Patient also notes some discomfort around his sternum when he coughs and around his right chest tube, but notes the pain is well managed on current regimen. Breathing is stable on room air. Tolerating NJ tube feeds, still reports feeling nauseous and now states he has difficulty swallowing. Patient is passing flatus and is having regular bowel movements that have been brown in color with no blood noted. He denies any abdominal pain or vomiting. Patient also denies chest pain, palpitations, syncopal symptoms, chills, myalgias, or sternal popping/clicking.     Patient complains if sore mouth and trouble with talking.       OBJECTIVE:   1. VITAL SIGNS:   Temp:  [97.1  F (36.2  C)-98.8  F (37.1  C)] 97.1  F (36.2  C)  Pulse:  [105-113] 112  Resp:  [18-29] 29  BP: ()/(80-93) 108/93  SpO2:  [87 %-100 %] 99 %  Resp: 29      2. INTAKE/ OUTPUT:   I/O last 3 completed shifts:  In: 2035 [P.O.:560; I.V.:20; NG/GT:330]  Out: 4145 [Urine:3925; Stool:100; Chest Tube:120]    3. PHYSICAL EXAMINATION:   General: Laying in bed, somnolent   ENT: oral thrush, white spots on palate and tongue.   Neuro: Follows commands, no focal deficits noted  Resp: RRR on room air. No wheezing, crackles, or rhonchi noted in posterior upper and lower lung fields bilaterally  CV: Tachycardic, LVAD hum noted. No murmurs, gallops, or friction rubs   Abdomen: Soft, non-distended, non-tender. Bowel sounds present in all quadrants   Incisions: C/d/i, no erythema   Extremities: Warm and well perfused, no edema of lower extremities bilaterally  Drains: Right chest tube has had 90 mL of serosanguineous output with no air leak noted. Removed today 8/3 with no complications.   LVAD Flow 3.4 - 4.4, PI 4.3 - 5.8, Speed 5300, Power 3.8 - 3.9     4. INVESTIGATIONS:   Arterial Blood Gases   Recent Labs   Lab 08/01/22 2102 08/01/22  1719   PH 7.52* 7.52*   PCO2 37 38   PO2 61* 44*    HCO3 30* 31*     Complete Blood Count   Recent Labs   Lab 08/03/22  0606 08/02/22  2054 08/02/22  1319 08/02/22  0455 08/01/22  1344 08/01/22  0742 07/31/22  2340 07/31/22  0712   WBC 9.5  --   --  9.0  --  10.6  --  10.0   HGB 8.8*  8.8* 9.3* 9.7* 9.0*  9.0*   < > 9.1*  9.1*   < > 9.2*     --   --  289  --  406  --  335    < > = values in this interval not displayed.     Basic Metabolic Panel  Recent Labs   Lab 08/03/22  0724 08/03/22  0351 08/03/22  0001 08/02/22  1931 08/02/22 0743 08/02/22 0455 08/01/22  0743 08/01/22  0742 07/31/22  0802 07/31/22  0712 07/30/22  1149 07/30/22  1009   NA  --   --   --   --   --  132*  --  131*  --  131*  --  133*   POTASSIUM  --   --   --   --   --  4.0  --  4.1  --  4.1  --  4.3   CHLORIDE  --   --   --   --   --  94*  --  93*  --  93*  --  94*   CO2  --   --   --   --   --  30*  --  28  --  28  --  29   BUN  --   --   --   --   --  21.2  --  21.6  --  25.0*  --  20.7   CR  --   --   --   --   --  0.68  --  0.67  --  0.67  --  0.68   * 148* 133* 170*   < > 144*   < > 148*   < > 144*   < > 142*    < > = values in this interval not displayed.     Liver Function Tests  Recent Labs   Lab 08/03/22 0606 08/02/22 0455 08/01/22  0742 07/31/22  0712 07/30/22  1009   AST  --  30 33 32 34   ALT  --  13 14 14 11   ALKPHOS  --  215* 205* 221* 195*   BILITOTAL  --  0.4 0.4 0.5 0.5   ALBUMIN  --  2.9* 3.0* 2.9* 2.8*   INR 2.36* 2.08* 1.87* 1.62* 1.58*     Pancreatic Enzymes  No lab results found in last 7 days.  Coagulation Profile  Recent Labs   Lab 08/03/22  0606 08/02/22  0455 08/01/22  0742 07/31/22  0712   INR 2.36* 2.08* 1.87* 1.62*         5. RADIOLOGY:   Recent Results (from the past 24 hour(s))   XR Chest Port 1 View    Narrative    XR CHEST PORT 1 VIEW  8/2/2022 6:16 PM      HISTORY: interval change    COMPARISON: Chest x-ray 8/1/2022, 7/31/2022.    FINDINGS:   Portable AP 45 degree upright chest x-ray. Postsurgical changes in the  chest. Median sternotomy  wires are intact. Right chest tube in stable  position. LVAD. Left chest wall ICD with leads terminating over the  right atrium and left ventricle. Right arm PICC terminates over the  low SVC. Enteric tube courses below the margin of the film.    Trachea is midline. Cardiac silhouette is enlarged. Pulmonary  vasculature is distinct. Stable bilateral perihilar interstitial  opacities. Small bilateral pleural effusions are unchanged.       Impression    IMPRESSION:     1. Decrease in right-sided pneumothorax with chest tube in place.  2. Relatively unchanged pulmonary opacities and  small pleural  effusions.  3. Support devices in stable position.    I have personally reviewed the examination and initial interpretation  and I agree with the findings.    JILL CARRANZA MD         SYSTEM ID:  [id number]       =========================================

## 2022-08-03 NOTE — PLAN OF CARE
Goal Outcome Evaluation:    Neuro: Oriented to questions. Confused and restless often throughout the night. PRN melatonin and benadryl given for sleep. Sitter remains for fall risk/impulsivity.  Cardiac: HM 3 LVAD. Values WDL. ST. Rates 100-110s. Doppler maps 80-90. Afebrile.   Respiratory: Sating >92% on RA.  GI/: Marcelo still in place as bumex is still ordered IV. AUOP via marcelo. 1 BM overnight.   Diet/appetite: Tolerating cyclical TF. Running from 6p-10a at 75 mls/hour via NJ with FWF 30 ml Q4H. NPO. Intermittent nausea.  Activity:  Assist of 1-2 out of bed.   Pain: PRN oxycodone given x2 and dilaudid given x1 for shoulder/back pain.  Skin: No new deficits noted. Blanchable redness on coccyx. Mepilex applied  LDA's: HM3, TL PICC, NJ, Marcelo    Plan: Needs UA. Continue with POC. Notify primary team with changes.

## 2022-08-04 ENCOUNTER — APPOINTMENT (OUTPATIENT)
Dept: SPEECH THERAPY | Facility: CLINIC | Age: 62
DRG: 001 | End: 2022-08-04
Attending: PHYSICIAN ASSISTANT
Payer: COMMERCIAL

## 2022-08-04 LAB
ALBUMIN SERPL BCG-MCNC: 2.9 G/DL (ref 3.5–5.2)
ALP SERPL-CCNC: 216 U/L (ref 40–129)
ALT SERPL W P-5'-P-CCNC: 14 U/L (ref 10–50)
ANION GAP SERPL CALCULATED.3IONS-SCNC: 10 MMOL/L (ref 7–15)
AST SERPL W P-5'-P-CCNC: 32 U/L (ref 10–50)
BACTERIA BLD CULT: NO GROWTH
BACTERIA WND CULT: NORMAL
BILIRUB SERPL-MCNC: 0.5 MG/DL
BUN SERPL-MCNC: 24.7 MG/DL (ref 8–23)
CA-I BLD-MCNC: 4.5 MG/DL (ref 4.4–5.2)
CALCIUM SERPL-MCNC: 8.7 MG/DL (ref 8.8–10.2)
CHLORIDE SERPL-SCNC: 92 MMOL/L (ref 98–107)
CREAT SERPL-MCNC: 0.75 MG/DL (ref 0.67–1.17)
DEPRECATED HCO3 PLAS-SCNC: 26 MMOL/L (ref 22–29)
ERYTHROCYTE [DISTWIDTH] IN BLOOD BY AUTOMATED COUNT: 17.3 % (ref 10–15)
GFR SERPL CREATININE-BSD FRML MDRD: >90 ML/MIN/1.73M2
GLUCOSE BLDC GLUCOMTR-MCNC: 107 MG/DL (ref 70–99)
GLUCOSE BLDC GLUCOMTR-MCNC: 110 MG/DL (ref 70–99)
GLUCOSE BLDC GLUCOMTR-MCNC: 130 MG/DL (ref 70–99)
GLUCOSE BLDC GLUCOMTR-MCNC: 157 MG/DL (ref 70–99)
GLUCOSE BLDC GLUCOMTR-MCNC: 165 MG/DL (ref 70–99)
GLUCOSE BLDC GLUCOMTR-MCNC: 168 MG/DL (ref 70–99)
GLUCOSE BLDC GLUCOMTR-MCNC: 171 MG/DL (ref 70–99)
GLUCOSE SERPL-MCNC: 164 MG/DL (ref 70–99)
HCT VFR BLD AUTO: 28.9 % (ref 40–53)
HGB BLD-MCNC: 8.8 G/DL (ref 13.3–17.7)
HGB BLD-MCNC: 9.1 G/DL (ref 13.3–17.7)
HGB BLD-MCNC: 9.3 G/DL (ref 13.3–17.7)
HOLD SPECIMEN: NORMAL
INR PPP: 2.66 (ref 0.85–1.15)
LACTATE SERPL-SCNC: 1.5 MMOL/L (ref 0.7–2)
MAGNESIUM SERPL-MCNC: 2.4 MG/DL (ref 1.7–2.3)
MCH RBC QN AUTO: 29.4 PG (ref 26.5–33)
MCHC RBC AUTO-ENTMCNC: 31.5 G/DL (ref 31.5–36.5)
MCV RBC AUTO: 94 FL (ref 78–100)
PHOSPHATE SERPL-MCNC: 4.5 MG/DL (ref 2.5–4.5)
PLATELET # BLD AUTO: 370 10E3/UL (ref 150–450)
POTASSIUM SERPL-SCNC: 3.9 MMOL/L (ref 3.4–5.3)
PROT SERPL-MCNC: 6.9 G/DL (ref 6.4–8.3)
RBC # BLD AUTO: 3.09 10E6/UL (ref 4.4–5.9)
SODIUM SERPL-SCNC: 128 MMOL/L (ref 136–145)
VANCOMYCIN SERPL-MCNC: 32.6 UG/ML
WBC # BLD AUTO: 10.5 10E3/UL (ref 4–11)

## 2022-08-04 PROCEDURE — 80202 ASSAY OF VANCOMYCIN: CPT | Performed by: THORACIC SURGERY (CARDIOTHORACIC VASCULAR SURGERY)

## 2022-08-04 PROCEDURE — 250N000011 HC RX IP 250 OP 636: Performed by: SURGERY

## 2022-08-04 PROCEDURE — 85018 HEMOGLOBIN: CPT | Performed by: PHYSICIAN ASSISTANT

## 2022-08-04 PROCEDURE — 250N000013 HC RX MED GY IP 250 OP 250 PS 637: Performed by: STUDENT IN AN ORGANIZED HEALTH CARE EDUCATION/TRAINING PROGRAM

## 2022-08-04 PROCEDURE — 250N000013 HC RX MED GY IP 250 OP 250 PS 637: Performed by: SURGERY

## 2022-08-04 PROCEDURE — 99233 SBSQ HOSP IP/OBS HIGH 50: CPT | Performed by: STUDENT IN AN ORGANIZED HEALTH CARE EDUCATION/TRAINING PROGRAM

## 2022-08-04 PROCEDURE — 120N000003 HC R&B IMCU UMMC

## 2022-08-04 PROCEDURE — 250N000011 HC RX IP 250 OP 636: Performed by: THORACIC SURGERY (CARDIOTHORACIC VASCULAR SURGERY)

## 2022-08-04 PROCEDURE — C9113 INJ PANTOPRAZOLE SODIUM, VIA: HCPCS | Performed by: PHYSICIAN ASSISTANT

## 2022-08-04 PROCEDURE — 999N000128 HC STATISTIC PERIPHERAL IV START W/O US GUIDANCE

## 2022-08-04 PROCEDURE — 258N000003 HC RX IP 258 OP 636: Performed by: THORACIC SURGERY (CARDIOTHORACIC VASCULAR SURGERY)

## 2022-08-04 PROCEDURE — 83735 ASSAY OF MAGNESIUM: CPT | Performed by: SURGERY

## 2022-08-04 PROCEDURE — 82330 ASSAY OF CALCIUM: CPT | Performed by: SURGERY

## 2022-08-04 PROCEDURE — 87040 BLOOD CULTURE FOR BACTERIA: CPT | Performed by: PHYSICIAN ASSISTANT

## 2022-08-04 PROCEDURE — 250N000013 HC RX MED GY IP 250 OP 250 PS 637: Performed by: PHYSICIAN ASSISTANT

## 2022-08-04 PROCEDURE — 85027 COMPLETE CBC AUTOMATED: CPT | Performed by: SURGERY

## 2022-08-04 PROCEDURE — 83605 ASSAY OF LACTIC ACID: CPT | Performed by: SURGERY

## 2022-08-04 PROCEDURE — 36416 COLLJ CAPILLARY BLOOD SPEC: CPT | Performed by: PHYSICIAN ASSISTANT

## 2022-08-04 PROCEDURE — 250N000013 HC RX MED GY IP 250 OP 250 PS 637: Performed by: THORACIC SURGERY (CARDIOTHORACIC VASCULAR SURGERY)

## 2022-08-04 PROCEDURE — 250N000011 HC RX IP 250 OP 636: Performed by: PHYSICIAN ASSISTANT

## 2022-08-04 PROCEDURE — 99233 SBSQ HOSP IP/OBS HIGH 50: CPT | Mod: GC | Performed by: INTERNAL MEDICINE

## 2022-08-04 PROCEDURE — 36415 COLL VENOUS BLD VENIPUNCTURE: CPT | Performed by: PHYSICIAN ASSISTANT

## 2022-08-04 PROCEDURE — 36415 COLL VENOUS BLD VENIPUNCTURE: CPT | Performed by: SURGERY

## 2022-08-04 PROCEDURE — 85610 PROTHROMBIN TIME: CPT | Performed by: SURGERY

## 2022-08-04 PROCEDURE — 250N000013 HC RX MED GY IP 250 OP 250 PS 637: Performed by: INTERNAL MEDICINE

## 2022-08-04 PROCEDURE — 80053 COMPREHEN METABOLIC PANEL: CPT | Performed by: SURGERY

## 2022-08-04 PROCEDURE — 92526 ORAL FUNCTION THERAPY: CPT | Mod: GN

## 2022-08-04 PROCEDURE — 84100 ASSAY OF PHOSPHORUS: CPT | Performed by: SURGERY

## 2022-08-04 RX ORDER — WARFARIN SODIUM 7.5 MG/1
7.5 TABLET ORAL
Status: COMPLETED | OUTPATIENT
Start: 2022-08-04 | End: 2022-08-04

## 2022-08-04 RX ORDER — POTASSIUM CHLORIDE 750 MG/1
40 TABLET, EXTENDED RELEASE ORAL 2 TIMES DAILY
Status: DISCONTINUED | OUTPATIENT
Start: 2022-08-04 | End: 2022-08-04

## 2022-08-04 RX ORDER — ZOLPIDEM TARTRATE 5 MG/1
5 TABLET ORAL AT BEDTIME
Status: DISCONTINUED | OUTPATIENT
Start: 2022-08-04 | End: 2022-08-20

## 2022-08-04 RX ORDER — BUMETANIDE 0.25 MG/ML
3 INJECTION INTRAMUSCULAR; INTRAVENOUS 3 TIMES DAILY
Status: DISCONTINUED | OUTPATIENT
Start: 2022-08-04 | End: 2022-08-06

## 2022-08-04 RX ORDER — POTASSIUM CHLORIDE 1.5 G/1.58G
40 POWDER, FOR SOLUTION ORAL 2 TIMES DAILY
Status: DISCONTINUED | OUTPATIENT
Start: 2022-08-04 | End: 2022-08-07

## 2022-08-04 RX ORDER — BUMETANIDE 0.25 MG/ML
3 INJECTION INTRAMUSCULAR; INTRAVENOUS ONCE
Status: COMPLETED | OUTPATIENT
Start: 2022-08-04 | End: 2022-08-04

## 2022-08-04 RX ADMIN — Medication 1 PACKET: at 20:39

## 2022-08-04 RX ADMIN — NYSTATIN 1000000 UNITS: 100000 SUSPENSION ORAL at 15:21

## 2022-08-04 RX ADMIN — INSULIN ASPART 2 UNITS: 100 INJECTION, SOLUTION INTRAVENOUS; SUBCUTANEOUS at 20:39

## 2022-08-04 RX ADMIN — ASPIRIN 81 MG CHEWABLE TABLET 81 MG: 81 TABLET CHEWABLE at 07:28

## 2022-08-04 RX ADMIN — ACETAMINOPHEN 975 MG: 325 TABLET, FILM COATED ORAL at 15:20

## 2022-08-04 RX ADMIN — QUETIAPINE FUMARATE 100 MG: 50 TABLET, EXTENDED RELEASE ORAL at 22:35

## 2022-08-04 RX ADMIN — ACETAMINOPHEN 975 MG: 325 TABLET, FILM COATED ORAL at 23:50

## 2022-08-04 RX ADMIN — BUMETANIDE 3 MG: 2 TABLET ORAL at 07:27

## 2022-08-04 RX ADMIN — BUMETANIDE 3 MG: 2 TABLET ORAL at 15:20

## 2022-08-04 RX ADMIN — QUETIAPINE FUMARATE 25 MG: 25 TABLET ORAL at 15:20

## 2022-08-04 RX ADMIN — INSULIN ASPART 2 UNITS: 100 INJECTION, SOLUTION INTRAVENOUS; SUBCUTANEOUS at 07:31

## 2022-08-04 RX ADMIN — HYDROXYCHLOROQUINE SULFATE 200 MG: 200 TABLET, FILM COATED ORAL at 21:12

## 2022-08-04 RX ADMIN — LIDOCAINE PATCH 4% 1 PATCH: 40 PATCH TOPICAL at 07:27

## 2022-08-04 RX ADMIN — VANCOMYCIN HYDROCHLORIDE 750 MG: 10 INJECTION, POWDER, LYOPHILIZED, FOR SOLUTION INTRAVENOUS at 17:18

## 2022-08-04 RX ADMIN — CAPTOPRIL 12.5 MG: 12.5 TABLET ORAL at 15:20

## 2022-08-04 RX ADMIN — BUMETANIDE 3 MG: 0.25 INJECTION, SOLUTION INTRAMUSCULAR; INTRAVENOUS at 18:43

## 2022-08-04 RX ADMIN — DIGOXIN 125 MCG: 125 TABLET ORAL at 07:27

## 2022-08-04 RX ADMIN — INSULIN ASPART 2 UNITS: 100 INJECTION, SOLUTION INTRAVENOUS; SUBCUTANEOUS at 03:49

## 2022-08-04 RX ADMIN — Medication 1 PACKET: at 07:29

## 2022-08-04 RX ADMIN — Medication 10 MG: at 22:32

## 2022-08-04 RX ADMIN — POTASSIUM CHLORIDE 40 MEQ: 1.5 POWDER, FOR SOLUTION ORAL at 20:39

## 2022-08-04 RX ADMIN — BUMETANIDE 3 MG: 0.25 INJECTION, SOLUTION INTRAMUSCULAR; INTRAVENOUS at 18:47

## 2022-08-04 RX ADMIN — ONDANSETRON 4 MG: 2 INJECTION INTRAMUSCULAR; INTRAVENOUS at 18:42

## 2022-08-04 RX ADMIN — Medication 1 PACKET: at 15:20

## 2022-08-04 RX ADMIN — OLANZAPINE 5 MG: 5 TABLET, FILM COATED ORAL at 21:12

## 2022-08-04 RX ADMIN — CAPTOPRIL 12.5 MG: 12.5 TABLET ORAL at 21:12

## 2022-08-04 RX ADMIN — SERTRALINE HYDROCHLORIDE 50 MG: 50 TABLET ORAL at 07:26

## 2022-08-04 RX ADMIN — NYSTATIN 1000000 UNITS: 100000 SUSPENSION ORAL at 07:28

## 2022-08-04 RX ADMIN — VANCOMYCIN HYDROCHLORIDE 1000 MG: 1 INJECTION, SOLUTION INTRAVENOUS at 04:56

## 2022-08-04 RX ADMIN — PANTOPRAZOLE SODIUM 40 MG: 40 INJECTION, POWDER, FOR SOLUTION INTRAVENOUS at 07:27

## 2022-08-04 RX ADMIN — ACETAMINOPHEN 975 MG: 325 TABLET, FILM COATED ORAL at 07:27

## 2022-08-04 RX ADMIN — NYSTATIN 1000000 UNITS: 100000 SUSPENSION ORAL at 20:39

## 2022-08-04 RX ADMIN — HYDROXYCHLOROQUINE SULFATE 200 MG: 200 TABLET, FILM COATED ORAL at 07:28

## 2022-08-04 RX ADMIN — ZOLPIDEM TARTRATE 5 MG: 5 TABLET, FILM COATED ORAL at 22:33

## 2022-08-04 RX ADMIN — PANTOPRAZOLE SODIUM 40 MG: 40 INJECTION, POWDER, FOR SOLUTION INTRAVENOUS at 20:39

## 2022-08-04 RX ADMIN — FLUTICASONE FUROATE AND VILANTEROL TRIFENATATE 1 PUFF: 100; 25 POWDER RESPIRATORY (INHALATION) at 07:29

## 2022-08-04 RX ADMIN — WARFARIN SODIUM 7.5 MG: 7.5 TABLET ORAL at 17:18

## 2022-08-04 RX ADMIN — MULTIVITAMIN 15 ML: LIQUID ORAL at 07:27

## 2022-08-04 RX ADMIN — QUETIAPINE FUMARATE 25 MG: 25 TABLET ORAL at 07:28

## 2022-08-04 RX ADMIN — OLANZAPINE 5 MG: 5 TABLET, FILM COATED ORAL at 07:27

## 2022-08-04 RX ADMIN — CAPTOPRIL 12.5 MG: 12.5 TABLET ORAL at 07:28

## 2022-08-04 RX ADMIN — ATORVASTATIN CALCIUM 40 MG: 40 TABLET, FILM COATED ORAL at 21:12

## 2022-08-04 RX ADMIN — PROCHLORPERAZINE EDISYLATE 10 MG: 5 INJECTION INTRAMUSCULAR; INTRAVENOUS at 22:04

## 2022-08-04 RX ADMIN — OXYCODONE HYDROCHLORIDE 5 MG: 5 TABLET ORAL at 03:49

## 2022-08-04 ASSESSMENT — ACTIVITIES OF DAILY LIVING (ADL)
ADLS_ACUITY_SCORE: 34
ADLS_ACUITY_SCORE: 34
ADLS_ACUITY_SCORE: 40
ADLS_ACUITY_SCORE: 34
ADLS_ACUITY_SCORE: 40
ADLS_ACUITY_SCORE: 34
ADLS_ACUITY_SCORE: 40
ADLS_ACUITY_SCORE: 34
ADLS_ACUITY_SCORE: 36
ADLS_ACUITY_SCORE: 40

## 2022-08-04 NOTE — PLAN OF CARE
Neuro: A&Ox2-3.   Cardiac: -110's. VSS. HM3 without alarms.    Respiratory: Sating > 94% on RA.  GI/: Adequate urine output, marcelo removed. Several loose BMs  Diet/appetite: Tolerating cycled TFs.   Activity:  Assist of 1-2, up to chair.  Pain: At acceptable level on current regimen.   Skin: No new deficits noted.  LDA's: PIV, NJ with feeds.     Plan: Continue with POC. Notify primary team with changes.

## 2022-08-04 NOTE — PROGRESS NOTES
[facility] General ID Service: Follow Up Note      Patient:  Dandy Sands   Date of birth 1960, Medical record number [id number]  Date of Visit:  08/04/2022  Date of Admission: 6/17/2022         Assessment and Recommendations:   ID Problem List:  1. E.faecalis on culture from PICC line (8/2/22) - 1/2 tubes in 1/4 sets collected 8/2  2. S.epidermidis (MSSE) on culture from PICC line (8/2/22) - 1/2 tubes in 1/4 sets collected 8/2  3. S/p HM3 LVAD (7/8/22)  4. HFrEF due to ICM  5. SLE on hydroxychloroquine    Recommendations:  1. Continue vancomycin  2. Following previously collected BCx, if no additional positives will treat with short 5-day course for positive line cultures  3. Does not need repeat blood cultures unless additional positives or spikes a fever  4. Does not need cardiac imaging related to positive blood cultures at this time      Discussion:  Dandy Sands is a 61 year old male with history of SLE, antiphospholipid antibody syndrome on warfarin, CAD, HFrEF due to ICM, severe MR, who transferred to [facility] from [facility] on 6/17/22 with acute decompensated heart failure and cardiogenic shock. Recent hospitalization (5/16-5/26) for CAD with ischemic lesions and mitral insufficiency. Briefly, had IABP placed 6/23/22, HeartMate 3 LVAD placed 7/8/22, course c/b need for RVAD (7/8-7/11), pericardial tamponade.  Previous sputum with E.faecalis (pan-S), E.coli (R: ampicillin, Unasyn, ceftazidime, ceftriaxone, Bactrim). Has had daily blood cultures x2 sets since 7/9/22. Culture from PICC line on 8/2 with E.faecalis (pan-S) on day #1; a second set of blood cultures was collected from PICC line on same day and now positive for S.epidermidis. PICC line has been removed. Afebrile with stable vitals. WBC is WNL at 9.5K, CRP elevated at 141, however no recent baseline available. CXR obtained on 8/3 due to tachypnea, no infiltrate to suggest pneumonia, respiratory status improved on 8/4.     Possibility of  line colonization vs true bacteremia, awaiting additional cultures. Vancomycin started 8/3. If no additional positives, favor a short 5-day course for colonized line as long as peripheral cultures remain negative. At this time, does not need cardiac imaging related to positive blood culture as there is a very clearly established onset of positivity.       Recs were discussed with primary team today. Don't hesitate to call with questions.     Attestation:  I have reviewed today's vital signs, medications, labs and imaging.  Grazyna Millan PA-C, Pager # [phone]            Interval History:       Did not sleep overnight. Drowsy this morning, easily distractible and pulling at NGT. Sitter at bedside- reports he has been reaching for things that are not there; 2 watery stools this morning. Denies pain, nausea, cough.          Review of Systems:   Limited ROS obtained, pertinent positives and negatives as above.          Current Antimicrobials   Current:  - Vancomycin (8/3-present)    Prior:  - Zosyn (7/25-7/26; 7/15-7/22)  - Vancomycin (7/25; 7/8-7/14)  - cefepime (7/8-7/14)  - fluconazole (7/8-7/10)  - Rifampin (7/9-7/10)  - levofloxacin (7/8)       Physical Exam:   Ranges for vital signs:  Temp:  [96.7  F (35.9  C)-98.7  F (37.1  C)] 97  F (36.1  C)  Pulse:  [102-115] 102  Resp:  [18-24] 18  BP: ()/(32-95) 106/95  SpO2:  [93 %-100 %] 100 %    Intake/Output Summary (Last 24 hours) at 8/4/2022 1425  Last data filed at 8/4/2022 1301  Gross per 24 hour   Intake 1575 ml   Output 850 ml   Net 725 ml     Exam:  GENERAL:  Awake, drowsy. Intermittently answers questions. Rubbing nose and NGT.  ENT:  Head is normocephalic, atraumatic. Oropharynx is moist. NGT in place.  EYES:  Eyes have anicteric sclerae.    LUNGS:  Clear to anterior auscultation.  CARDIOVASCULAR:  +LVAD hum  ABDOMEN:  Normal bowel sounds, soft, nontender. Driveline dressing is dry.  EXT: Extremities warm and without edema.  SKIN:  No acute rashes. PIV in  place without any surrounding erythema.         Laboratory Data:   Reviewed.  Pertinent for:    Culture data:  Microbiology:  Culture   Date Value Ref Range Status   08/03/2022 No growth, less than 1 day  Preliminary   08/03/2022 No growth after 12 hours  Preliminary   08/03/2022 No growth after 12 hours  Preliminary   08/02/2022 No growth after 1 day  Preliminary   08/02/2022 Positive on the 1st day of incubation (A)  Preliminary   08/02/2022 Staphylococcus epidermidis (AA)  Preliminary     Comment:     1 of 2 bottles   08/02/2022 Positive on the 1st day of incubation (A)  Preliminary   08/02/2022 Enterococcus faecalis (AA)  Preliminary     Comment:     1 of 2 bottles   08/01/2022 No growth after 3 days  Preliminary   07/31/2022 No growth after 4 days  Preliminary   07/30/2022 No Growth  Final   07/29/2022 No Growth  Final   07/28/2022 No Growth  Final   07/28/2022 No anaerobic organisms isolated  Final   07/28/2022 No Growth  Final   07/27/2022 No Growth  Final   07/25/2022 No Growth  Final   07/25/2022 No Growth  Final   07/25/2022 No Growth  Final   07/24/2022 No Growth  Final   07/23/2022 No Growth  Final   07/22/2022 No Growth  Final   07/21/2022 No Growth  Final   07/21/2022 No Growth  Final   07/21/2022 No Growth  Final   07/20/2022 No Growth  Final   07/19/2022 No Growth  Final   07/18/2022 No Growth  Final   07/17/2022 No Growth  Final   07/16/2022 No Growth  Final   07/15/2022 No Growth  Final   07/13/2022 No Growth  Final   07/13/2022 No Growth  Final   07/13/2022 No Growth  Final   07/12/2022 No Growth  Final   07/12/2022 4+ Candida albicans (A)  Final     Comment:     Susceptibilities not routinely done   07/12/2022 1+ Escherichia coli (A)  Final   07/12/2022 3+ Enterococcus faecalis (A)  Final   07/12/2022 No Growth  Final   07/12/2022 No Growth  Final   07/11/2022 No Growth  Final   07/10/2022 No Growth  Final   07/09/2022 No Growth  Final   05/19/2022 No Growth  Final       Inflammatory Markers     Recent Labs   Lab Test 08/03/22  0606 06/19/22  1522   SED  --  39*   .00* 28.0*       Hematology Studies    Recent Labs   Lab Test 08/04/22  1202 08/04/22  0745 08/03/22 2001 08/03/22  1154 08/03/22  0606 08/02/22  1319 08/02/22  0455 08/01/22  1344 08/01/22  0742   WBC  --  10.5  --   --  9.5  --  9.0  --  10.6   HGB 9.3* 9.1* 9.3* 9.2* 8.8*  8.8*   < > 9.0*  9.0*   < > 9.1*  9.1*   MCV  --  94  --   --  95  --  95  --  95   PLT  --  370  --   --  298  --  289  --  406    < > = values in this interval not displayed.     Recent Labs   Lab Test 07/20/22  0949 07/20/22  0355 07/19/22  2049 07/19/22  0946   ANEU 14.6* 11.8* 19.5* 16.3*   AEOS 0.5 0.1 0.4 0.2       Metabolic Studies     Recent Labs   Lab Test 08/04/22  0745 08/04/22  0056 08/03/22  0606 08/02/22  0455 08/01/22  0742 06/19/22  2157 06/19/22  1522 05/20/22  1715 05/20/22  0516   *  --  133* 132* 131*   < > 138   < > 138   POTASSIUM 3.9  --  4.2 4.0 4.1   < > 4.0   < > 4.2   CHLORIDE 92*  --  94* 94* 93*   < > 107   < > 105   CO2 26  --  27 30* 28   < > 26   < > 24   BUN 24.7*  --  22.6 21.2 21.6   < > 10   < > 16   CR 0.75  --  0.72 0.68 0.67   < > 0.78   < > 0.92   GFRESTIMATED >90  --  >90 >90 >90   < > >90   < > >90   ANIONGAP 10  --  12 8 10   < > 5   < > 9   A1C  --   --   --   --   --   --   --   --  5.5   LACT  --  1.5 1.4 1.1 1.4   < > 1.1   < >  --    CKT  --   --   --   --   --   --  37  --   --     < > = values in this interval not displayed.       Hepatic Studies    Recent Labs   Lab Test 08/04/22  0745 08/03/22  0606 08/02/22  0455 07/08/22  0332 07/07/22  0356   BILITOTAL 0.5 0.4 0.4   < > 0.3   ALKPHOS 216* 215* 215*   < > 125   ALBUMIN 2.9* 2.9* 2.9*   < > 3.5   AST 32 37 30   < > 32   ALT 14 15 13   < > 41   LDH  --   --   --   --  259*    < > = values in this interval not displayed.       Urine Studies     Recent Labs   Lab Test 08/03/22  1110 07/21/22  2239 07/12/22  1045 05/19/22  1810   URINEPH 7.0 5.5 5.5 6.0    NITRITE Negative Negative Negative Negative   LEUKEST Negative Negative Negative Negative   WBCU 21* 1 7* 0       Last check of C difficile  C Difficile Toxin B by PCR   Date Value Ref Range Status   07/18/2022 Negative Negative Final     Comment:     A negative result does not exclude actual disease due to C. difficile and may be due to improper collection, handling and storage of the specimen or the number of organisms in the specimen is below the detection limit of the assay.       Drug Level Monitoring  Recent Labs   Lab Test 08/04/22  0744   VANCOMYCIN 32.6*       Hepatitis B Testing   Recent Labs   Lab Test 05/20/22  0516   HBCAB Nonreactive   HEPBANG Nonreactive       Hepatitis C Testing     Hepatitis C Antibody   Date Value Ref Range Status   05/20/2022 Nonreactive Nonreactive Final              Imaging:     CXR (8/3/2022)  FINDINGS: Heart borderline enlarged. LVAD. Implantable cardiac  defibrillator. Right basilar chest catheter. Right PICC tip in the  proximal SVC. Median sternotomy. Feeding tube beyond the inferior  margin of the image. Previous right apical pneumothorax less  conspicuous.                                                        IMPRESSION: Slight decrease in conspicuity of right apical  pneumothorax, otherwise grossly stable.     CTA Chest/Abd/ Pelvis (7/12/22)  IMPRESSION:  1. Moderate to large hemopericardium centered predominantly about the  LVAD outflow tract without focal contrast extravasation identified.   2. Cardiomegaly with expected postoperative changes of recent  sternotomy closure.  3. Mild pulmonary edema with moderate right and small left pleural  effusions and associated atelectasis.  4. Tiny left pneumothorax with chest tube in place.  5. Probable small splenic infarct.  6. Additional incidental/chronic findings as noted above.

## 2022-08-04 NOTE — PROGRESS NOTES
CVTS PROGRESS NOTE  08/04/2022      ASSESSMENT Dandy Sands is a 61 year old male with a PMH of CAD s/p PCI to LAD, MI, ICM, acute on chronic heart failure, s/p CRT-D, severe MR, antiphospholipid antibody syndrome on chronic warfarin, SLE, HTN, HLD, recent hospitalization 5/16-5/26 s/p RCA, LAD stenting who underwent RVAD (29F Protek duo RIJ) LVAD placement (HeartMate 3) on 7/8/22 by Dr. Zamora. Intraoperative course complicated by IABP dysfunction and RV failure, requiring crash onto bypass / vasopressor support, NO, and protek placement. Now s/p chest closure and NO wean 7/11. Return to OR for hemopericardium (7/12) and chest re-closure 7/13. Protek RVAD removed 7/21.    PLAN:   Neuro/ pain/ sedation:  #Acute Postoperative pain  #Depression   #Anxiety due to a medical condition  #Insomnia  #Delerium   Delirious intermittently; patient is not sleeping at all at night and this is likely contributing significantly to his delirium and confusion. He is also very anxious at night and early in the morning.   - Monitor neurological status. Notify the MD for any acute changes in exam.  - Pain:    -Scheduled tylenol. Scheduled oxycodone 5mg q8h discontinued on 8/2   -PRN oxycodone 5mg q4h, dilaudid 0.2 mg q 2 hours PRN  - Depression: Zoloft 50 mg started on 8/2   - Duloxetine 30 mg discontinued on 8/2  - Sleep: Melatonin 10 mg HS, benadryl 25 mg HS PRN, Seroquel 25 mg in AM and 4 pm and Seroquel  mg at 8 pm. Added as of psych consult on 8/3. Added zolpidem 5 mg HS on 8/4.      - Trazodone discontinued on 8/2   - Zyprexa 5mg BID   - Anxiety: Valproate po 250 mg BID and 500mg Bedtime    - Hydroxyzine PRN discontinued on 8/2 due to anticholinergic effects               - Ativan 0.5-1.0mg IV q6h PRN added for anxiety, discontinued on 8/2 for worsening confusion  - PTA meds: hydroxyzine 25mg PRN, zolpidem 5mg, melatonin 10mg, lorazepam 0.5mg  - QTc(7/18)- 683 msec, no haldol  - Recent fall on 7/29 and increased  delirium over weekend; CT scan of head on 8/1 did not show any acute intracranial pathologies, ABG was also not significant   - Continue sitter       Pulmonary:   #Acute hypoxic respiratory failure on iMV  #Mild-moderate emphysema  #History of COVID-19  - Saturating well on room air as of 8/3; was previously on nasal cannula 2 LPM  - Maintain SpO2> 92%  - Incentive spirometry   - Right chest tube placed by IR via ultrasound on 8/1 for moderate pleural effusion; chest x-ray post-procedure showed a small right pneumothorax, which has improved as of chest x-ray on 8/3.   -Right chest tube removed on 8/3 with no complications. Obtained a chest x-ray pre and post removal which shows a stable right apical pneumothorax    Cardiovascular:    #S/p LVAD placement (HeartMate 3) on 7/8/22   #S/p RVAD (29F Protek duo RIJ) on 7/8/22  #S/p chest closure 7/11  #Cardiogenic shock  #Advanced ischemic cardiomyopathy, class IV, stage D  #CAD s/p PCI to LAD (2005)  #S/p CRT-D (2006)  #History of MI (2007)  #Severe MR  #Antiphospholipid antibody syndrome on chronic warfarin  #HTN, HLD  #Recent hospitalization 5/16-5/26 s/p RCA, LAD stenting  #Atrial fibrillation   # RV mobile clot   - Monitor hemodynamic status. Chest closure and oxygenator splicing 7/11. Reopened 7/12 for I&D and left open, closed 7/13. RVAD removed 7/21.  - Goal MAP > 65   - Pulse continues to be in 100's; underlying rhythm is sinus; continue to monitor   - PTA meds(held): clopidogrel 75mg, lasix 40mg, warfarin 5mg  - LVAD management per Advanced HF service  - AC Plan: High intensity Heparin for RV mobile thrombus  - Aspirin 81 mg  - Atorvastatin 40mg   - Hydralazine 10-20 mg q 4 hours PRN for MAP >90  - Captopril 6.25mg TID dose titration per cardiology   - Warfarin 7/24 per pharmacy  - Echocardiogram ordered on 8/2 for status of RV clot due to persistent shortness of breath, no thrombus in the right ventricle, Dilated IVC.     GI / Nutrition:   #GERD  #Congestive  hepatopathy in the setting of cardiac insuffiencey - Resolved  #Hematemesis / oral mucosal bleeding -resolved    - Full liquid diet, SLP following  - Continue NJT at goal   - Nutrition following  - Bowel regimen prn (miralax / senna)  - Trend LFT's every other day   - GI consulted 7/31 for black tarry stools and a possible GI bleed, however it is more likely swallowed blood from epistaxis that patient previously had. GI does not recommend an upper endoscopy at this time. Continue IV PPI BID.  - 8/3 Patient reported trouble swallowing yesterday evening; speech therapy consult ordered on 8/3. Will place patient NPO and cycle tube feeds at 75 ml/hr with 30 ml FWF Q 4hrs from 6pm - 10 am. Thrush also noted on exam today, this is improving as of 8/4  -8/4 Speech consult cleared patient for full diet with thin liquids - Patient must be up in his chair when eating or drinking to prevent any aspirations.     Renal/ Fluid Balance:    - Strict I/O, daily weights   - Avoid/limit nephrotoxins as able  - Replete lytes PRN per protocol  - PTA meds: tamsulosin 0.4mg (held)  - Goal net negative ~ 1L   Bumex gtt for part of 7/24: total UOP 8L, gtt stopped   Bumex 4mg x2 7/25 with over 6L UOP  - Bumex 3mg x3 7/27 and 7/28 with good diuresis, continue bumex 3 mg IV increased to TID on 7/31 given SOB.   -8/3 Decrease bumex 3 mg IV TID to bumex 3 mg PO BID per cardiology with no potassium supplementation due to normal levels, marcelo removed today with no complications with UA ordered.   -8/4 Will go back to bumex 3 mg IV TID with potassium supplementation due to signs and symptoms of fluid overload from oral bumex change above    Endocrine:    #Stress induced hyperglycemia  Preop A1c 5.5%  - Insulin glargine 20U and high sliding scale  - Goal BG <180 for optimal healing   - Holding glargine 7/29 in anticipation of possibly cycling tube feeding, has had minimal insulin requirements 7/29 with TF running. Will continue to hold and plan to  start cycle feeds 7/30. Restart NPH if sugars remain elevated.     ID/ Antibiotics:  #Periopearive antibiosis (s/p course of Cefepime, vancomycin, rifampin, fluconazole)  #HAP - Enterococcus faecalis PNA, s/p treatment   - 7/12 Pan culture: Bcx x2, UA/UC -> negative  - 7/14 Endotracheal sputum culture (4+ candida albicans, 1+ E.Coli, 3+ enterococcus faecalis)   - Zosyn for HAP(7/15-7/22)  - 7/18 C.diff- negative  - 7/25 Blood cultures drawn for unexplained hypotension   - NGTD  - 8/3 PICC line culture on 8/2 grew gram positive cocci in pairs and chairs (E. Faecalis and S. Epidermidis); ID consulted and recommeds removal of PICC (removed and cultured), vancomycin 750 mg IV every 12 hours for a short five day course, will recheck levels in 1-3 days and serum creatinine levels ordered daily for first week of therapy and at least twice weekly for subsequent weeks. Blood cultures are pending as of 8/4.    - Nystatin swish and swallow for thrush 8/3     Heme:     #Acute blood loss anemia  #Acute blood loss thrombocytopenia  #History of DVT and PE   #Antiphospholipid antibody syndrome  #History of iron deficiency anemia  - Monitor for s/sx of bleeding  - Trend CBC and coags  - Hgb goal >8   Hgb stable in 8-9's, no signs or symptoms of bleeding   - Plt goal > 20  - History of nose bleed on 7/30, lost 2 units of blood. Transfused for hgb 6.8, 2U received. Recheck ordered and has been stable in 8-9's; will continue to monitor daily   - Melena reported on 7/30 - 3 stools over night, see GI above, stools have been brown in color as of 8/2  - Continue to dose coumadin, therapeutic.     Rheumatology:  #SLE  - Chronic: symptoms include arthritis, photosensitivity, fatigue, and skin manifestations  - PTA meds: hydroxychloroquine 200mg, resumed 7/16  - Rheumatology consulted and following. Follow dsDNA and complement levels(7/18)   - Will need cellcept restarted when appropriate    Prophylaxis:    - DVT: SCD + high intensity  heparin-held for bleeding + warfarin   - PPI    Disposition:  - Transfer to  on 7/28  - Son updated today     LENA Sebastian     Discussed with Dr. Zamora    Patient seen with me and I agree with plan.     Yas Gilman PA-C  Cardiothoracic Surgery  Pager: [phone]    ----------------------------------------------------------------    Interval Events:    No overnight events. Patient continues to not sleep during the night and feeling tired/anxious in the mornings. He also notes still being short of breath at times. Patient also notes some discomfort around his sternum when he coughs, but notes the pain is well managed on current regimen. Breathing is stable on room air. Tolerating NJ tube feeds, still reports feeling nauseous at times and trouble with talking. Patient is having regular bowel movements that have been brown in color with no blood noted. He denies any abdominal pain or vomiting. Patient also denies chest pain, palpitations, syncopal symptoms, chills, myalgias, or sternal popping/clicking.       OBJECTIVE:   1. VITAL SIGNS:   Temp:  [96.7  F (35.9  C)-98.7  F (37.1  C)] 96.7  F (35.9  C)  Pulse:  [105-115] 115  Resp:  [18-24] 18  BP: ()/(32-81) 70/38  SpO2:  [93 %-98 %] 96 %  Resp: 18      2. INTAKE/ OUTPUT:   I/O last 3 completed shifts:  In: 1881 [P.O.:250; I.V.:56; NG/GT:450]  Out: 2190 [Urine:2150; Chest Tube:40]    3. PHYSICAL EXAMINATION:   General: Laying in bed, somnolent   ENT: White spots on palate and tongue consistent with oral thrush, significantly improving as of 8/4  Neuro: Follows commands, no focal deficits noted  Resp: RRR on room air. No wheezing, crackles, or rhonchi noted in anterior upper and lower lung fields bilaterally  CV: Tachycardic, LVAD hum noted. No murmurs, gallops, or friction rubs. +JVD   Abdomen: Soft, non-distended, non-tender. Bowel sounds present in all quadrants   Incisions: C/d/i, no erythema   Extremities: Warm and well perfused, no  edema of lower extremities bilaterally  LVAD Flow 3.8 - 4.3, PI 4.0 - 5.3, Speed 5300, Power 3.8 - 3.9     4. INVESTIGATIONS:   Arterial Blood Gases   Recent Labs   Lab 08/01/22 2102 08/01/22  1719   PH 7.52* 7.52*   PCO2 37 38   PO2 61* 44*   HCO3 30* 31*     Complete Blood Count   Recent Labs   Lab 08/04/22  0745 08/03/22  2001 08/03/22  1154 08/03/22  0606 08/02/22  1319 08/02/22  0455 08/01/22  1344 08/01/22  0742   WBC 10.5  --   --  9.5  --  9.0  --  10.6   HGB 9.1* 9.3* 9.2* 8.8*  8.8*   < > 9.0*  9.0*   < > 9.1*  9.1*     --   --  298  --  289  --  406    < > = values in this interval not displayed.     Basic Metabolic Panel  Recent Labs   Lab 08/04/22  0731 08/04/22  0348 08/03/22  2328 08/03/22  1948 08/03/22  0724 08/03/22  0606 08/02/22  0743 08/02/22  0455 08/01/22  0743 08/01/22  0742 07/31/22  0802 07/31/22  0712   NA  --   --   --   --   --  133*  --  132*  --  131*  --  131*   POTASSIUM  --   --   --   --   --  4.2  --  4.0  --  4.1  --  4.1   CHLORIDE  --   --   --   --   --  94*  --  94*  --  93*  --  93*   CO2  --   --   --   --   --  27  --  30*  --  28  --  28   BUN  --   --   --   --   --  22.6  --  21.2  --  21.6  --  25.0*   CR  --   --   --   --   --  0.72  --  0.68  --  0.67  --  0.67   * 171* 136* 154*   < > 155*   < > 144*   < > 148*   < > 144*    < > = values in this interval not displayed.     Liver Function Tests  Recent Labs   Lab 08/03/22  0606 08/02/22  0455 08/01/22  0742 07/31/22  0712   AST 37 30 33 32   ALT 15 13 14 14   ALKPHOS 215* 215* 205* 221*   BILITOTAL 0.4 0.4 0.4 0.5   ALBUMIN 2.9* 2.9* 3.0* 2.9*   INR 2.36* 2.08* 1.87* 1.62*     Pancreatic Enzymes  No lab results found in last 7 days.  Coagulation Profile  Recent Labs   Lab 08/03/22  0606 08/02/22  0455 08/01/22  0742 07/31/22  0712   INR 2.36* 2.08* 1.87* 1.62*         5. RADIOLOGY:   Recent Results (from the past 24 hour(s))   Echo Limited    Narrative     [id number]  [id number]  [id number]  [id number]^ZACHARIAH^ISHMAEL^PERRY     [facility],[place]  Echocardiography Laboratory  [address] [place] [address]     Name: OLEG KAUR  MRN: [id number]  : 1960  Study Date: 2022 08:53 AM  Age: 61 yrs  Gender: Male  Patient Location: [facility]  Reason For Study: Other, Please Specify in Comments  Ordering Physician: ISHMAEL SONI  Performed By: Imelda Pratt     BSA: 1.8 m2  Height: 67 in  Weight: 151 lb  ______________________________________________________________________________  Procedure  Limited Portable Echo Adult. Poor acoustic windows.  ______________________________________________________________________________  Interpretation Summary  HM3 LVAD at 5300 RPM.  Limited echo to assess RV thrombus.  No thrombus noted in the right ventricle.  Global right ventricular function is normal.  Dilation of the inferior vena cava is present with abnormal respiratory  variation in diameter.  No pericardial effusion is present.     This study was compared with the study from 22. The RV thrombus is no  longer visualized.  ______________________________________________________________________________  Left Ventricle  LVAD cannula was seen in the expected anatomic position in the LV apex.     Right Ventricle  The right ventricle is normal size. Global right ventricular function is  normal. A pacemaker lead is noted in the right ventricle. No thrombus noted in  the right ventricle.     Tricuspid Valve  The tricuspid valve is normal. Mild tricuspid insufficiency is present.     Pulmonic Valve  The pulmonic valve is normal.     Vessels  Dilation of the inferior vena cava is present with abnormal respiratory  variation in diameter.     Pericardium  No pericardial effusion is present.     Compared to Previous Study  This study was compared with the study from 22 .      ______________________________________________________________________________  Report approved by: Nakia Ewing 08/03/2022 11:07 AM     ______________________________________________________________________________      XR Chest Port 1 View    Narrative    Portable chest    INDICATION: Interval change    COMPARISON: Yesterday    FINDINGS: Heart borderline enlarged. LVAD. Implantable cardiac  defibrillator. Right basilar chest catheter. Right PICC tip in the  proximal SVC. Median sternotomy. Feeding tube beyond the inferior  margin of the image. Previous right apical pneumothorax less  conspicuous.      Impression    IMPRESSION: Slight decrease in conspicuity of right apical  pneumothorax, otherwise grossly stable.    CONSTANTINO OWEN MD         SYSTEM ID:  [id number]   XR Chest Port 1 View    Narrative    XR CHEST PORT 1 VIEW  8/3/2022 6:12 PM      HISTORY: interval change    COMPARISON: Chest x-ray 8/3/2022, 8/20/2022.    FINDINGS:   Portable AP 70 degrees upright chest x-ray. Left chest wall ICD with  leads terminating over the right atrium, Right Ventricle, and Left  Ventricle. Post Surgical Changes in the Chest. Median Sternotomy Wires  Are Intact. LVAD. Right arm PICC terminates over the low SVC. Enteric  tube courses below the margin of film. Right basilar chest tube  removed.    Trachea is midline. Cardiac silhouette is enlarged. Pulmonary  vasculature is indistinct. Bibasilar opacities are unchanged. Trace  right apical pneumothorax. Small right greater than left pleural  effusions are unchanged. Aortic arch calcifications.    Visualized osseous structures and soft tissues are unremarkable.      Impression    IMPRESSION:   1.  Trace right apical pneumothorax, grossly unchanged status post  right chest tube removal.  2.  Cardiomegaly with stable right greater than left basilar  opacities, pulmonary edema versus superimposed atelectasis.  3.  Small right greater than left pleural effusions are  unchanged.    I have personally reviewed the examination and initial interpretation  and I agree with the findings.    RAHEEM MEAD MD         SYSTEM ID:  [id number]       =========================================

## 2022-08-04 NOTE — PLAN OF CARE
Neuro: Alert and restless/anxious, orientation waxes and wanes throughout the day.   Cardiac: -120's. Otherwise VSS. HM3 LVAD without alarms.   Respiratory: Sating > 94% on RA.  GI/: Adequate urine output. BM X 2, loose.   Diet/appetite: Tolerating cycled TFs. Started regular diet with thin liquids today. Only ate a few bites of his dinner this evening.   Activity:  Assist of 1-2 with GB and walker, up to chair several times today and for dinner. Ambulated to hallway x 1 today. Sternal precautions maintained.  Pain: At acceptable level on current regimen. Did not require PRNs today.   Skin: No new deficits noted.  LDA's: PIV, NJ with feeds.     Plan: Continue with POC. Notify primary team with changes.

## 2022-08-04 NOTE — PHARMACY-VANCOMYCIN DOSING SERVICE
Pharmacy Vancomycin Note  Date of Service 2022  Patient's  1960   61 year old, male    Indication: Bacteremia  Day of Therapy: 2  Current vancomycin regimen:  1250 mg x1 followed by 1000mg IV q12h  Current vancomycin monitoring method: AUC  Current vancomycin therapeutic monitoring goal: 400-600 mg*h/L    InsightRX Prediction of Current Vancomycin Regimen  Loading dose: 1250 mg IV once now  Regimen: 1000 mg IV every 12 hours starting at 0530 22  Start time: 17:30 on 2022  Exposure target: AUC24 (range)400-600 mg/L.hr   AUC24,ss: 514 mg/L.hr  Probability of AUC24 > 400: 76 %  Ctrough,ss: 15.5 mg/L  Probability of Ctrough,ss > 20: 29 %  Probability of nephrotoxicity (Lodise TAD ): 11 %    Current estimated CrCl = Estimated Creatinine Clearance: 96.3 mL/min (based on SCr of 0.75 mg/dL).    Creatinine for last 3 days  2022:  4:55 AM Creatinine 0.68 mg/dL  8/3/2022:  6:06 AM Creatinine 0.72 mg/dL  2022:  7:45 AM Creatinine 0.75 mg/dL    Recent Vancomycin Levels (past 3 days)  2022:  7:44 AM Vancomycin 32.6 ug/mL    Vancomycin IV Administrations (past 72 hours)                   vancomycin (VANCOCIN) 1000 mg in dextrose 5% 200 mL PREMIX (mg) 1,000 mg New Bag 22 0456    vancomycin 1250 mg in 0.9% NaCl 250 mL intermittent infusion 1,250 mg (mg) 1,250 mg New Bag 22 1840                Nephrotoxins and other renal medications (From now, onward)    Start     Dose/Rate Route Frequency Ordered Stop    22 0530  vancomycin (VANCOCIN) 1000 mg in dextrose 5% 200 mL PREMIX         1,000 mg  200 mL/hr over 1 Hours Intravenous EVERY 12 HOURS 22 1721      22 1600  bumetanide (BUMEX) tablet 3 mg         3 mg Oral 2 TIMES DAILY (Diuretics and Nitrates) 22 1206      22 1400  captopril (CAPOTEN) tablet 12.5 mg         12.5 mg Oral 3 TIMES DAILY 22 1228               Contrast Orders - past 72 hours (72h ago, onward)    None          Interpretation of  levels and current regimen:  Vancomycin level is reflective of supratherapeutic level    Has serum creatinine changed greater than 50% in last 72 hours: No    Urine output:  good urine output    Renal Function: Stable    InsightRX Prediction of Planned New Vancomycin Regimen  Loading dose: N/A  Regimen: 750 mg IV every 12 hours.  Start time: 16:56 on 08/04/2022  Exposure target: AUC24 (range)400-600 mg/L.hr   AUC24,ss: 485 mg/L.hr  Probability of AUC24 > 400: 81 %  Ctrough,ss: 13.7 mg/L  Probability of Ctrough,ss > 20: 11 %  Probability of nephrotoxicity (Lodise TAD 2009): 9 %      Plan:  1. Decrease Dose to 750 mg every 12 hours  2. Vancomycin monitoring method: AUC  3. Vancomycin therapeutic monitoring goal: 400-600 mg*h/L  4. Pharmacy will check vancomycin levels as appropriate in 1-3 Days.  5. Serum creatinine levels will be ordered daily for the first week of therapy and at least twice weekly for subsequent weeks.    Leon Le RP

## 2022-08-04 NOTE — PLAN OF CARE
Goal Outcome Evaluation:    Neuro: A&O2-3. Answers orientation questions correctly. Confused and agitated throughout the night. Slept very little. Sitter remains for fall risk/impulsivity.  Cardiac: HM 3 LVAD. Values WDL. ST. Rates 100-110s. Doppler MAPs 65-80. Afebrile.   Respiratory: Sating >92% on RA. Dyspnea on exertion.  GI/: AUOP. BM x1 overnight.  Diet/appetite: Tolerating cyclical TF. Running from 6p-10a at 75 mls/hour via NJ with FWF 30 ml Q4H. NPO.  Activity:  Assist of 1-2 out of bed.   Pain: PRN oxycodone given x2 given for shoulder/back pain.  Skin: No new deficits noted. Blanchable redness on coccyx. Mepilex CDI.   LDA's: HM3, NJ, PIV    Plan: Continue with POC. Notify primary team with changes.

## 2022-08-04 NOTE — PROVIDER NOTIFICATION
"Provider notified - Surgery Cross Cover    2025- \"T Shavon 6B [id number] fyi blood collected from red lumen of PICC last night at 2055 shows gram + cocci in clusters. Thanks Shania [phone]\"  "

## 2022-08-04 NOTE — PROGRESS NOTES
LVAD Social Work Services Progress Note      Date of Initial Social Work Evaluation: 5/18/2022 with admission assessment completed on 6/20/2022  Collaborated with: Patient and Son-Amos    Data: Dandy remains on 6B recovering status-post VAD implant. Still has a feeding tube and is now delirious. Has a Sitter in his room for safety as Dandy tries to get out of bed sometimes impulsively. Seems restless at times and anxious. Psychiatry has been following for medication recommendations. Patient's Speech is a lot more difficult to understand compared to last week. VAD teaching has been put on hold as well as discharge planning to rehab.  Intervention: Met with Dandy in order to inquire into questions and concerns. Assessed coping and tried to provide support and encouragement. Difficult to understand the patient. Called sonThomas to inquire into and address any questions/concerns.  Assessment: Son coping appropriate considering situation. Reports that it seems as though the patient has gotten worse over the last week and voices frustration with patient's lack of progress. [facility] for patient to be ready for participation in VAD teaching as well as discharge to rehab. Maricel still in [place], but planning to come on Sunday through Tuesday to visit the patient. Teaching and discharge on hold until patient improves.  Education provided by SW: Ongoing SW availability  Plan:    Discharge Plans in Progress: TBD-[facility] TCU/ARU    Barriers to d/c plan: Medical condition and mental state-still needs 1:1    Follow up Plan: SW will continue to follow for ongoing psychosocial support post-VAD implant and assistance with discharge planning.

## 2022-08-04 NOTE — PROGRESS NOTES
Cardiology Progress Note    Assessment & Plan   Dandy Sands is a 61 year old male with PMH of lupus, antiphospholipid syndrome, HTN, HLD, HFrEF 2/2 ICM who presented with cardiogenic shock c/b multiorgan failure (JOSE, shock liver) requiring mechanical support with IABP, now s/p HM3 LVAD 7/8/22 by Dr. Zamora with intraoperative course c/b RV failure s/p 29F Protek duo via RIJ. Post op course c/b hemopericardium s/p repeat washout with chest left open. Chest closed 7/13/22 and decannulated from RVAD 7/21    Changes Today:  -Continue bumex 3mg PO BID  -LVAD with acceptable function  -Anticoagulation per CVTS  -Antibiotic management per ID and primary     #HFrEF 2/2 ICM s/p HM3 LVAD in setting of cardiogenic shock (SCAI D)  #RV failure s/p Protek duo temporary RVAD s/p decannulation 7/21  #RV thrombus  #Post chest closure hemopericardium s/p repeat washout (7/12)  Patient with ICM s/p HM3 LVAD 7/8/22 by Dr. Zamora in setting of presentation for cardiogenic shock requiring MCS with IABP as prior to HM (initially evaluated for heart transplant, however noted to have substantially elevated DSAs during course of care, so decision made to proceed with LVAD given patient already on temporary MCS). Intraoperative course c/b RV failure resulting in cardiogenic shock requiring high dose pressors necessitating RVAD support. Initial post-op course c/b hemopericardium requiring repeat washout with chest left open, however has since recovered well with decannulation on 7/21 with extubation day prior. Has continued to tolerate well from hemodynamic and end organ standpoint. Continuing to work on pulmonary toilet and diuresis to improve his oxygenation and total body fluid status.   -LVAD: continues to have good flows, no alarms and stable end organ perfusion; continue speed at 5300  -continue digoxin for RV support  -AC, antiplatelets: continue ASA; On warfarin  -Volume status: Continue bumex to 3mg PO BID  -rhythm:  "sinus              -of note, patient with climbing capture threshold of RV lead; will need to be evaluated in future by EP team  -blood pressure: Continue captopril to 12.5mg TID, may increase if MAPs remain elevated  -encourage ICS, pulmonary toilet  -delirium precautions     The patient's care to be discussed with the Attending Physician, Dr. Faustin, Bedside Nurse and Primary team    Chris Lawrence MD  Cardiology Fellow      Interval History   Altered in AM, trying to get out of bed. Feeling short of breath while satting 100% on RA.      Physical Exam   Temp: (!) 96.7  F (35.9  C) Temp src: Axillary BP: (!) 70/38 Pulse: 115   Resp: 18 SpO2: 96 % O2 Device: None (Room air)    Vitals:    08/03/22 0615 08/03/22 1104 08/04/22 0557   Weight: 68.4 kg (150 lb 12.7 oz) 63.4 kg (139 lb 12.4 oz) 65 kg (143 lb 4.8 oz)     Vital Signs with Ranges  Temp:  [96.7  F (35.9  C)-98.7  F (37.1  C)] 96.7  F (35.9  C)  Pulse:  [105-115] 115  Resp:  [18-24] 18  BP: ()/(32-81) 70/38  SpO2:  [93 %-98 %] 96 %  I/O last 3 completed shifts:  In: 1881 [P.O.:250; I.V.:56; NG/GT:450]  Out: 2190 [Urine:2150; Chest Tube:40]     , Blood pressure (!) 70/38, pulse 115, temperature (!) 96.7  F (35.9  C), temperature source Axillary, resp. rate 18, height 1.702 m (5' 7\"), weight 65 kg (143 lb 4.8 oz), SpO2 96 %.  143 lbs 4.78 oz  General Appearance:  Older male in NAD   Respiratory: coarse lung sounds bilaterally  Cardiovascular: vad hum, driveline site c/d/i, chest incisions c/d/i  GI: soft, nt, bs active  Skin: warm, well perfused, trace diffuse edema  Neuro: awake, alert, interactive, speech fluent, moving all 4 extremities    Medications     dextrose         acetaminophen  975 mg Oral or Feeding Tube Q8H CAMMY     aspirin  81 mg Oral or Feeding Tube Daily     atorvastatin  40 mg Oral QPM     bumetanide  3 mg Oral BID     captopril  12.5 mg Oral TID     digoxin  125 mcg Oral Daily     fiber modular (NUTRISOURCE FIBER)  1 packet Per Feeding " Tube TID     fluticasone-vilanterol  1 puff Inhalation Daily     hydroxychloroquine  200 mg Oral BID     insulin aspart  1-12 Units Subcutaneous Q4H     [Held by provider] insulin glargine  20 Units Subcutaneous QAM AC     lidocaine  1 patch Transdermal Q24h    And     lidocaine   Transdermal Q8H CAMMY     melatonin  10 mg Oral At Bedtime     multivitamins w/minerals  15 mL Per Feeding Tube Daily     nystatin  1,000,000 Units Swish & Swallow 4x Daily     OLANZapine  5 mg Oral BID     oxidized cellulose   Topical Once     pantoprazole (PROTONIX) IV  40 mg Intravenous BID     protein modular  1 packet Per Feeding Tube TID     QUEtiapine  100 mg Oral At Bedtime     QUEtiapine  25 mg Oral BID     sertraline  50 mg Oral Daily     vancomycin  1,000 mg Intravenous Q12H     Warfarin Therapy Reminder  1 each Oral See Admin Instructions       Data   Recent Labs   Lab 08/04/22  0745 08/04/22  0731 08/04/22  0348 08/03/22  2328 08/03/22 2001 08/03/22  1646 08/03/22  1154 08/03/22  0724 08/03/22  0606 08/02/22  0743 08/02/22  0455   WBC 10.5  --   --   --   --   --   --   --  9.5  --  9.0   HGB 9.1*  --   --   --  9.3*  --  9.2*  --  8.8*  8.8*   < > 9.0*  9.0*   MCV 94  --   --   --   --   --   --   --  95  --  95     --   --   --   --   --   --   --  298  --  289   INR 2.66*  --   --   --   --   --   --   --  2.36*  --  2.08*   *  --   --   --   --   --   --   --  133*  --  132*   POTASSIUM 3.9  --   --   --   --   --   --   --  4.2  --  4.0   CHLORIDE 92*  --   --   --   --   --   --   --  94*  --  94*   CO2 26  --   --   --   --   --   --   --  27  --  30*   BUN 24.7*  --   --   --   --   --   --   --  22.6  --  21.2   CR 0.75  --   --   --   --   --   --   --  0.72  --  0.68   ANIONGAP 10  --   --   --   --   --   --   --  12  --  8   ELDA 8.7*  --   --   --   --   --   --   --  8.7*  --  8.8   * 168* 171*   < >  --    < >  --    < > 155*   < > 144*   ALBUMIN 2.9*  --   --   --   --   --   --   --  2.9*   --  2.9*   PROTTOTAL 6.9  --   --   --   --   --   --   --  6.6  --  6.6   BILITOTAL 0.5  --   --   --   --   --   --   --  0.4  --  0.4   ALKPHOS 216*  --   --   --   --   --   --   --  215*  --  215*   ALT 14  --   --   --   --   --   --   --  15  --  13   AST 32  --   --   --   --   --   --   --  37  --  30    < > = values in this interval not displayed.       Recent Results (from the past 24 hour(s))   Echo Limited    Narrative    [id number]  [id number]  [id number]  [id number]^ZACHARIAH^ISHMAEL^PERRY     [facility],[place]  Echocardiography Laboratory  [address] [place] [id number]     Name: OLEG KAUR  MRN: [id number]  : 1960  Study Date: 2022 08:53 AM  Age: 61 yrs  Gender: Male  Patient Location: [facility]  Reason For Study: Other, Please Specify in Comments  Ordering Physician: ISHMAEL SONI  Performed By: Imelda Pratt     BSA: 1.8 m2  Height: 67 in  Weight: 151 lb  ______________________________________________________________________________  Procedure  Limited Portable Echo Adult. Poor acoustic windows.  ______________________________________________________________________________  Interpretation Summary  HM3 LVAD at 5300 RPM.  Limited echo to assess RV thrombus.  No thrombus noted in the right ventricle.  Global right ventricular function is normal.  Dilation of the inferior vena cava is present with abnormal respiratory  variation in diameter.  No pericardial effusion is present.     This study was compared with the study from 22. The RV thrombus is no  longer visualized.  ______________________________________________________________________________  Left Ventricle  LVAD cannula was seen in the expected anatomic position in the LV apex.     Right Ventricle  The right ventricle is normal size. Global right ventricular function is  normal. A pacemaker lead is noted in the right ventricle. No thrombus noted in  the right ventricle.     Tricuspid Valve  The  tricuspid valve is normal. Mild tricuspid insufficiency is present.     Pulmonic Valve  The pulmonic valve is normal.     Vessels  Dilation of the inferior vena cava is present with abnormal respiratory  variation in diameter.     Pericardium  No pericardial effusion is present.     Compared to Previous Study  This study was compared with the study from 7/23/22 .     ______________________________________________________________________________  Report approved by: Nakia Ewing 08/03/2022 11:07 AM     ______________________________________________________________________________      XR Chest Port 1 View    Narrative    Portable chest    INDICATION: Interval change    COMPARISON: Yesterday    FINDINGS: Heart borderline enlarged. LVAD. Implantable cardiac  defibrillator. Right basilar chest catheter. Right PICC tip in the  proximal SVC. Median sternotomy. Feeding tube beyond the inferior  margin of the image. Previous right apical pneumothorax less  conspicuous.      Impression    IMPRESSION: Slight decrease in conspicuity of right apical  pneumothorax, otherwise grossly stable.    CONSTANTINO OWEN MD         SYSTEM ID:  [id number]   XR Chest Port 1 View    Narrative    XR CHEST PORT 1 VIEW  8/3/2022 6:12 PM      HISTORY: interval change    COMPARISON: Chest x-ray 8/3/2022, 8/20/2022.    FINDINGS:   Portable AP 70 degrees upright chest x-ray. Left chest wall ICD with  leads terminating over the right atrium, Right Ventricle, and Left  Ventricle. Post Surgical Changes in the Chest. Median Sternotomy Wires  Are Intact. LVAD. Right arm PICC terminates over the low SVC. Enteric  tube courses below the margin of film. Right basilar chest tube  removed.    Trachea is midline. Cardiac silhouette is enlarged. Pulmonary  vasculature is indistinct. Bibasilar opacities are unchanged. Trace  right apical pneumothorax. Small right greater than left pleural  effusions are unchanged. Aortic arch  calcifications.    Visualized osseous structures and soft tissues are unremarkable.      Impression    IMPRESSION:   1.  Trace right apical pneumothorax, grossly unchanged status post  right chest tube removal.  2.  Cardiomegaly with stable right greater than left basilar  opacities, pulmonary edema versus superimposed atelectasis.  3.  Small right greater than left pleural effusions are unchanged.    I have personally reviewed the examination and initial interpretation  and I agree with the findings.    RAHEEM MEAD MD         SYSTEM ID:  [id number]

## 2022-08-05 ENCOUNTER — APPOINTMENT (OUTPATIENT)
Dept: OCCUPATIONAL THERAPY | Facility: CLINIC | Age: 62
DRG: 001 | End: 2022-08-05
Attending: INTERNAL MEDICINE
Payer: COMMERCIAL

## 2022-08-05 LAB
ALBUMIN SERPL BCG-MCNC: 3 G/DL (ref 3.5–5.2)
ALP SERPL-CCNC: 211 U/L (ref 40–129)
ALT SERPL W P-5'-P-CCNC: 16 U/L (ref 10–50)
ANION GAP SERPL CALCULATED.3IONS-SCNC: 11 MMOL/L (ref 7–15)
AST SERPL W P-5'-P-CCNC: 34 U/L (ref 10–50)
BACTERIA BLD CULT: ABNORMAL
BACTERIA BLD CULT: ABNORMAL
BACTERIA BLD CULT: NO GROWTH
BACTERIA SPEC CULT: NO GROWTH
BILIRUB SERPL-MCNC: 0.4 MG/DL
BUN SERPL-MCNC: 26.6 MG/DL (ref 8–23)
CA-I BLD-MCNC: 4.5 MG/DL (ref 4.4–5.2)
CALCIUM SERPL-MCNC: 8.6 MG/DL (ref 8.8–10.2)
CHLORIDE SERPL-SCNC: 95 MMOL/L (ref 98–107)
CREAT SERPL-MCNC: 0.73 MG/DL (ref 0.67–1.17)
DEPRECATED HCO3 PLAS-SCNC: 28 MMOL/L (ref 22–29)
DIGOXIN SERPL-MCNC: 0.9 NG/ML (ref 0.6–2)
ERYTHROCYTE [DISTWIDTH] IN BLOOD BY AUTOMATED COUNT: 16.9 % (ref 10–15)
GFR SERPL CREATININE-BSD FRML MDRD: >90 ML/MIN/1.73M2
GLUCOSE BLDC GLUCOMTR-MCNC: 107 MG/DL (ref 70–99)
GLUCOSE BLDC GLUCOMTR-MCNC: 129 MG/DL (ref 70–99)
GLUCOSE BLDC GLUCOMTR-MCNC: 152 MG/DL (ref 70–99)
GLUCOSE BLDC GLUCOMTR-MCNC: 154 MG/DL (ref 70–99)
GLUCOSE BLDC GLUCOMTR-MCNC: 156 MG/DL (ref 70–99)
GLUCOSE BLDC GLUCOMTR-MCNC: 180 MG/DL (ref 70–99)
GLUCOSE SERPL-MCNC: 129 MG/DL (ref 70–99)
GRAM STAIN RESULT: NORMAL
HCT VFR BLD AUTO: 28.8 % (ref 40–53)
HGB BLD-MCNC: 8.8 G/DL (ref 13.3–17.7)
HGB BLD-MCNC: 9.1 G/DL (ref 13.3–17.7)
HGB BLD-MCNC: 9.1 G/DL (ref 13.3–17.7)
HGB BLD-MCNC: 9.2 G/DL (ref 13.3–17.7)
INR PPP: 2.7 (ref 0.85–1.15)
LACTATE SERPL-SCNC: 1.1 MMOL/L (ref 0.7–2)
MAGNESIUM SERPL-MCNC: 2.1 MG/DL (ref 1.7–2.3)
MCH RBC QN AUTO: 29 PG (ref 26.5–33)
MCHC RBC AUTO-ENTMCNC: 31.6 G/DL (ref 31.5–36.5)
MCV RBC AUTO: 92 FL (ref 78–100)
PHOSPHATE SERPL-MCNC: 4.5 MG/DL (ref 2.5–4.5)
PLATELET # BLD AUTO: 385 10E3/UL (ref 150–450)
POTASSIUM SERPL-SCNC: 3.8 MMOL/L (ref 3.4–5.3)
PROT SERPL-MCNC: 6.9 G/DL (ref 6.4–8.3)
RBC # BLD AUTO: 3.14 10E6/UL (ref 4.4–5.9)
SODIUM SERPL-SCNC: 134 MMOL/L (ref 136–145)
WBC # BLD AUTO: 10.1 10E3/UL (ref 4–11)

## 2022-08-05 PROCEDURE — 258N000003 HC RX IP 258 OP 636: Performed by: THORACIC SURGERY (CARDIOTHORACIC VASCULAR SURGERY)

## 2022-08-05 PROCEDURE — 83735 ASSAY OF MAGNESIUM: CPT | Performed by: SURGERY

## 2022-08-05 PROCEDURE — 97535 SELF CARE MNGMENT TRAINING: CPT | Mod: GO

## 2022-08-05 PROCEDURE — 99232 SBSQ HOSP IP/OBS MODERATE 35: CPT | Performed by: PHYSICIAN ASSISTANT

## 2022-08-05 PROCEDURE — 93750 INTERROGATION VAD IN PERSON: CPT | Performed by: INTERNAL MEDICINE

## 2022-08-05 PROCEDURE — 83605 ASSAY OF LACTIC ACID: CPT | Performed by: SURGERY

## 2022-08-05 PROCEDURE — 36415 COLL VENOUS BLD VENIPUNCTURE: CPT | Performed by: SURGERY

## 2022-08-05 PROCEDURE — C9113 INJ PANTOPRAZOLE SODIUM, VIA: HCPCS | Performed by: PHYSICIAN ASSISTANT

## 2022-08-05 PROCEDURE — 80162 ASSAY OF DIGOXIN TOTAL: CPT | Performed by: THORACIC SURGERY (CARDIOTHORACIC VASCULAR SURGERY)

## 2022-08-05 PROCEDURE — 80053 COMPREHEN METABOLIC PANEL: CPT | Performed by: SURGERY

## 2022-08-05 PROCEDURE — 99233 SBSQ HOSP IP/OBS HIGH 50: CPT | Mod: 25 | Performed by: INTERNAL MEDICINE

## 2022-08-05 PROCEDURE — 85610 PROTHROMBIN TIME: CPT | Performed by: SURGERY

## 2022-08-05 PROCEDURE — 85018 HEMOGLOBIN: CPT | Performed by: PHYSICIAN ASSISTANT

## 2022-08-05 PROCEDURE — 250N000013 HC RX MED GY IP 250 OP 250 PS 637: Performed by: SURGERY

## 2022-08-05 PROCEDURE — 250N000011 HC RX IP 250 OP 636: Performed by: PHYSICIAN ASSISTANT

## 2022-08-05 PROCEDURE — 250N000013 HC RX MED GY IP 250 OP 250 PS 637: Performed by: STUDENT IN AN ORGANIZED HEALTH CARE EDUCATION/TRAINING PROGRAM

## 2022-08-05 PROCEDURE — 250N000011 HC RX IP 250 OP 636: Performed by: THORACIC SURGERY (CARDIOTHORACIC VASCULAR SURGERY)

## 2022-08-05 PROCEDURE — 250N000013 HC RX MED GY IP 250 OP 250 PS 637: Performed by: PHYSICIAN ASSISTANT

## 2022-08-05 PROCEDURE — 84100 ASSAY OF PHOSPHORUS: CPT | Performed by: SURGERY

## 2022-08-05 PROCEDURE — 85027 COMPLETE CBC AUTOMATED: CPT | Performed by: SURGERY

## 2022-08-05 PROCEDURE — 250N000013 HC RX MED GY IP 250 OP 250 PS 637: Performed by: THORACIC SURGERY (CARDIOTHORACIC VASCULAR SURGERY)

## 2022-08-05 PROCEDURE — 31575 DIAGNOSTIC LARYNGOSCOPY: CPT | Performed by: PHYSICIAN ASSISTANT

## 2022-08-05 PROCEDURE — 258N000003 HC RX IP 258 OP 636: Performed by: PHYSICIAN ASSISTANT

## 2022-08-05 PROCEDURE — 99231 SBSQ HOSP IP/OBS SF/LOW 25: CPT | Mod: 25 | Performed by: PHYSICIAN ASSISTANT

## 2022-08-05 PROCEDURE — 250N000013 HC RX MED GY IP 250 OP 250 PS 637: Performed by: INTERNAL MEDICINE

## 2022-08-05 PROCEDURE — 97530 THERAPEUTIC ACTIVITIES: CPT | Mod: GO

## 2022-08-05 PROCEDURE — 120N000003 HC R&B IMCU UMMC

## 2022-08-05 PROCEDURE — 82330 ASSAY OF CALCIUM: CPT | Performed by: SURGERY

## 2022-08-05 PROCEDURE — 36415 COLL VENOUS BLD VENIPUNCTURE: CPT | Performed by: PHYSICIAN ASSISTANT

## 2022-08-05 RX ORDER — POTASSIUM CHLORIDE 1.5 G/1.58G
20 POWDER, FOR SOLUTION ORAL ONCE
Status: COMPLETED | OUTPATIENT
Start: 2022-08-05 | End: 2022-08-05

## 2022-08-05 RX ADMIN — VANCOMYCIN HYDROCHLORIDE 750 MG: 10 INJECTION, POWDER, LYOPHILIZED, FOR SOLUTION INTRAVENOUS at 18:32

## 2022-08-05 RX ADMIN — NYSTATIN 1000000 UNITS: 100000 SUSPENSION ORAL at 09:27

## 2022-08-05 RX ADMIN — MULTIVITAMIN 15 ML: LIQUID ORAL at 09:27

## 2022-08-05 RX ADMIN — INSULIN ASPART 1 UNITS: 100 INJECTION, SOLUTION INTRAVENOUS; SUBCUTANEOUS at 04:38

## 2022-08-05 RX ADMIN — OXYCODONE HYDROCHLORIDE 5 MG: 5 TABLET ORAL at 06:24

## 2022-08-05 RX ADMIN — Medication 1 PACKET: at 20:02

## 2022-08-05 RX ADMIN — INSULIN ASPART 1 UNITS: 100 INJECTION, SOLUTION INTRAVENOUS; SUBCUTANEOUS at 20:08

## 2022-08-05 RX ADMIN — Medication 1 PACKET: at 13:14

## 2022-08-05 RX ADMIN — QUETIAPINE FUMARATE 100 MG: 50 TABLET, EXTENDED RELEASE ORAL at 21:33

## 2022-08-05 RX ADMIN — OLANZAPINE 5 MG: 5 TABLET, FILM COATED ORAL at 19:57

## 2022-08-05 RX ADMIN — POTASSIUM CHLORIDE 40 MEQ: 1.5 POWDER, FOR SOLUTION ORAL at 19:57

## 2022-08-05 RX ADMIN — CAPTOPRIL 12.5 MG: 12.5 TABLET ORAL at 20:02

## 2022-08-05 RX ADMIN — OXYCODONE HYDROCHLORIDE 5 MG: 5 TABLET ORAL at 19:57

## 2022-08-05 RX ADMIN — QUETIAPINE FUMARATE 25 MG: 25 TABLET ORAL at 09:26

## 2022-08-05 RX ADMIN — OLANZAPINE 5 MG: 5 TABLET, FILM COATED ORAL at 09:26

## 2022-08-05 RX ADMIN — NYSTATIN 1000000 UNITS: 100000 SUSPENSION ORAL at 19:57

## 2022-08-05 RX ADMIN — HYDROMORPHONE HYDROCHLORIDE 0.2 MG: 0.2 INJECTION, SOLUTION INTRAMUSCULAR; INTRAVENOUS; SUBCUTANEOUS at 09:18

## 2022-08-05 RX ADMIN — LIDOCAINE PATCH 4% 1 PATCH: 40 PATCH TOPICAL at 09:22

## 2022-08-05 RX ADMIN — VANCOMYCIN HYDROCHLORIDE 750 MG: 10 INJECTION, POWDER, LYOPHILIZED, FOR SOLUTION INTRAVENOUS at 05:38

## 2022-08-05 RX ADMIN — ACETAMINOPHEN 975 MG: 325 TABLET, FILM COATED ORAL at 09:26

## 2022-08-05 RX ADMIN — ACETAMINOPHEN 975 MG: 325 TABLET, FILM COATED ORAL at 16:43

## 2022-08-05 RX ADMIN — ZOLPIDEM TARTRATE 5 MG: 5 TABLET, FILM COATED ORAL at 21:33

## 2022-08-05 RX ADMIN — WARFARIN SODIUM 9 MG: 7.5 TABLET ORAL at 18:24

## 2022-08-05 RX ADMIN — ACETAMINOPHEN 975 MG: 325 TABLET, FILM COATED ORAL at 23:45

## 2022-08-05 RX ADMIN — BUMETANIDE 3 MG: 0.25 INJECTION, SOLUTION INTRAMUSCULAR; INTRAVENOUS at 16:42

## 2022-08-05 RX ADMIN — INSULIN ASPART 2 UNITS: 100 INJECTION, SOLUTION INTRAVENOUS; SUBCUTANEOUS at 09:21

## 2022-08-05 RX ADMIN — Medication 10 MG: at 21:33

## 2022-08-05 RX ADMIN — OXYCODONE HYDROCHLORIDE 5 MG: 5 TABLET ORAL at 10:39

## 2022-08-05 RX ADMIN — FLUTICASONE FUROATE AND VILANTEROL TRIFENATATE 1 PUFF: 100; 25 POWDER RESPIRATORY (INHALATION) at 09:27

## 2022-08-05 RX ADMIN — HYDROXYCHLOROQUINE SULFATE 200 MG: 200 TABLET, FILM COATED ORAL at 09:26

## 2022-08-05 RX ADMIN — INSULIN ASPART 1 UNITS: 100 INJECTION, SOLUTION INTRAVENOUS; SUBCUTANEOUS at 23:52

## 2022-08-05 RX ADMIN — Medication 1 PACKET: at 09:28

## 2022-08-05 RX ADMIN — Medication 1 PACKET: at 13:13

## 2022-08-05 RX ADMIN — DIGOXIN 125 MCG: 125 TABLET ORAL at 09:27

## 2022-08-05 RX ADMIN — BUMETANIDE 3 MG: 0.25 INJECTION, SOLUTION INTRAMUSCULAR; INTRAVENOUS at 13:12

## 2022-08-05 RX ADMIN — QUETIAPINE FUMARATE 25 MG: 25 TABLET ORAL at 16:41

## 2022-08-05 RX ADMIN — CAPTOPRIL 12.5 MG: 12.5 TABLET ORAL at 13:12

## 2022-08-05 RX ADMIN — PANTOPRAZOLE SODIUM 40 MG: 40 INJECTION, POWDER, FOR SOLUTION INTRAVENOUS at 09:22

## 2022-08-05 RX ADMIN — OXYCODONE HYDROCHLORIDE 5 MG: 5 TABLET ORAL at 16:54

## 2022-08-05 RX ADMIN — HYDROXYCHLOROQUINE SULFATE 200 MG: 200 TABLET, FILM COATED ORAL at 19:57

## 2022-08-05 RX ADMIN — POTASSIUM CHLORIDE 40 MEQ: 1.5 POWDER, FOR SOLUTION ORAL at 09:26

## 2022-08-05 RX ADMIN — ASPIRIN 81 MG CHEWABLE TABLET 81 MG: 81 TABLET CHEWABLE at 09:26

## 2022-08-05 RX ADMIN — BUMETANIDE 3 MG: 0.25 INJECTION, SOLUTION INTRAMUSCULAR; INTRAVENOUS at 09:27

## 2022-08-05 RX ADMIN — SERTRALINE HYDROCHLORIDE 50 MG: 50 TABLET ORAL at 09:26

## 2022-08-05 RX ADMIN — NYSTATIN 1000000 UNITS: 100000 SUSPENSION ORAL at 13:12

## 2022-08-05 RX ADMIN — POTASSIUM CHLORIDE 20 MEQ: 1.5 POWDER, FOR SOLUTION ORAL at 12:00

## 2022-08-05 RX ADMIN — CAPTOPRIL 12.5 MG: 12.5 TABLET ORAL at 09:27

## 2022-08-05 RX ADMIN — ATORVASTATIN CALCIUM 40 MG: 40 TABLET, FILM COATED ORAL at 19:57

## 2022-08-05 RX ADMIN — NYSTATIN 1000000 UNITS: 100000 SUSPENSION ORAL at 16:41

## 2022-08-05 ASSESSMENT — ACTIVITIES OF DAILY LIVING (ADL)
ADLS_ACUITY_SCORE: 33
ADLS_ACUITY_SCORE: 33
ADLS_ACUITY_SCORE: 36
ADLS_ACUITY_SCORE: 34
ADLS_ACUITY_SCORE: 36
ADLS_ACUITY_SCORE: 35
ADLS_ACUITY_SCORE: 33
ADLS_ACUITY_SCORE: 34
ADLS_ACUITY_SCORE: 36
ADLS_ACUITY_SCORE: 34
ADLS_ACUITY_SCORE: 36
ADLS_ACUITY_SCORE: 36

## 2022-08-05 NOTE — PROGRESS NOTES
Cardiology Progress Note    Assessment & Plan   Dandy Sands is a 61 year old male with PMH of lupus, antiphospholipid syndrome, HTN, HLD, HFrEF 2/2 ICM who presented with cardiogenic shock c/b multiorgan failure (JOSE, shock liver) requiring mechanical support with IABP, now s/p HM3 LVAD 7/8/22 by Dr. Zamora with intraoperative course c/b RV failure s/p 29F Protek duo via RIJ. Post op course c/b hemopericardium s/p repeat washout with chest left open. Chest closed 7/13/22 and decannulated from RVAD 7/21    Changes Today:  -Continue bumex 3mg IV TID  -Continue Captopril 12.5mg TID and Dig 125mcg daily  -Anticoagulation per primary  -LVAD with acceptable function     #HFrEF 2/2 ICM s/p HM3 LVAD in setting of cardiogenic shock (SCAI D)  #RV failure s/p Protek duo temporary RVAD s/p decannulation 7/21  #RV thrombus  #Post chest closure hemopericardium s/p repeat washout (7/12)  Patient with ICM s/p HM3 LVAD 7/8/22 by Dr. Zamora in setting of presentation for cardiogenic shock requiring MCS with IABP as prior to HM (initially evaluated for heart transplant, however noted to have substantially elevated DSAs during course of care, so decision made to proceed with LVAD given patient already on temporary MCS). Intraoperative course c/b RV failure resulting in cardiogenic shock requiring high dose pressors necessitating RVAD support. Initial post-op course c/b hemopericardium requiring repeat washout with chest left open, however has since recovered well with decannulation on 7/21 with extubation day prior. Has continued to tolerate well from hemodynamic and end organ standpoint.   -LVAD: continues to have good flows, no alarms and stable end organ perfusion; continue speed at 5300  -continue digoxin for RV support  -AC, antiplatelets: continue ASA; On warfarin  -Volume status: Continue bumex to 3mg PO BID  -rhythm: sinus              -of note, patient with climbing capture threshold of RV lead; will need to be evaluated  "in future by EP team  -blood pressure: Continue captopril to 12.5mg TID, may increase if MAPs remain elevated  -encourage ICS, pulmonary toilet  -delirium precautions     Plan to be discussed with Dr Stewart.    Chris Lawrence MD  Cardiology Fellow      Interval History   Reviewed events from last 24 hours.  Comfortable on room air.    Physical Exam   Temp: 97.7  F (36.5  C) Temp src: Oral BP: 94/80 Pulse: 105   Resp: 20 SpO2: 93 % O2 Device: None (Room air)    Vitals:    08/04/22 0557 08/05/22 0110 08/05/22 1300   Weight: 65 kg (143 lb 4.8 oz) 65.7 kg (144 lb 13.5 oz) 63.5 kg (139 lb 15.9 oz)     Vital Signs with Ranges  Temp:  [97.3  F (36.3  C)-97.9  F (36.6  C)] 97.7  F (36.5  C)  Pulse:  [101-116] 105  Resp:  [18-20] 20  BP: ()/(64-90) 94/80  SpO2:  [92 %-99 %] 93 %  I/O last 3 completed shifts:  In: 1955 [P.O.:240; NG/GT:515]  Out: 3700 [Urine:3700]     , Blood pressure 94/80, pulse 105, temperature 97.7  F (36.5  C), temperature source Oral, resp. rate 20, height 1.702 m (5' 7\"), weight 63.5 kg (139 lb 15.9 oz), SpO2 93 %.  139 lbs 15.87 oz  General Appearance:  Older male in NAD   Respiratory: coarse lung sounds bilaterally  Cardiovascular: vad hum, driveline site c/d/i, chest incisions c/d/i  GI: soft, nt, bs active  Skin: warm, well perfused, trace diffuse edema  Neuro: awake, alert, interactive, speech fluent, moving all 4 extremities    Medications     dextrose         acetaminophen  975 mg Oral or Feeding Tube Q8H CAMMY     aspirin  81 mg Oral or Feeding Tube Daily     atorvastatin  40 mg Oral QPM     bumetanide  3 mg Intravenous TID     captopril  12.5 mg Oral TID     digoxin  125 mcg Oral Daily     fiber modular (NUTRISOURCE FIBER)  1 packet Per Feeding Tube TID     fluticasone-vilanterol  1 puff Inhalation Daily     hydroxychloroquine  200 mg Oral BID     insulin aspart  1-12 Units Subcutaneous Q4H     lidocaine  1 patch Transdermal Q24h    And     lidocaine   Transdermal Q8H [facility]     lidocaine 3%, " phenylephrine 0.25% solution for irrigation  5 mL Irrigation Once     melatonin  10 mg Oral At Bedtime     multivitamins w/minerals  15 mL Per Feeding Tube Daily     nystatin  1,000,000 Units Swish & Swallow 4x Daily     OLANZapine  5 mg Oral BID     oxidized cellulose   Topical Once     [START ON 8/6/2022] pantoprazole (PROTONIX) IV  40 mg Intravenous QAM AC     potassium chloride  40 mEq Oral BID     protein modular  1 packet Per Feeding Tube TID     QUEtiapine  100 mg Oral At Bedtime     QUEtiapine  25 mg Oral BID     sertraline  50 mg Oral Daily     vancomycin  750 mg Intravenous Q12H     warfarin ANTICOAGULANT  9 mg Oral ONCE at 18:00     Warfarin Therapy Reminder  1 each Oral See Admin Instructions     zolpidem  5 mg Oral At Bedtime       Data   Recent Labs   Lab 08/05/22  1236 08/05/22  1036 08/05/22  0759 08/05/22  0440 08/04/22 2035 08/04/22 2029 08/04/22  1131 08/04/22  0745 08/03/22  0724 08/03/22  0606   WBC  --   --   --  10.1  --   --   --  10.5  --  9.5   HGB  --  9.2*  --  9.1*  9.1*  --  8.8*   < > 9.1*   < > 8.8*  8.8*   MCV  --   --   --  92  --   --   --  94  --  95   PLT  --   --   --  385  --   --   --  370  --  298   INR  --   --   --  2.70*  --   --   --  2.66*  --  2.36*   NA  --   --   --  134*  --   --   --  128*  --  133*   POTASSIUM  --   --   --  3.8  --   --   --  3.9  --  4.2   CHLORIDE  --   --   --  95*  --   --   --  92*  --  94*   CO2  --   --   --  28  --   --   --  26  --  27   BUN  --   --   --  26.6*  --   --   --  24.7*  --  22.6   CR  --   --   --  0.73  --   --   --  0.75  --  0.72   ANIONGAP  --   --   --  11  --   --   --  10  --  12   ELDA  --   --   --  8.6*  --   --   --  8.7*  --  8.7*   *  --  180* 129*   < >  --    < > 164*   < > 155*   ALBUMIN  --   --   --  3.0*  --   --   --  2.9*  --  2.9*   PROTTOTAL  --   --   --  6.9  --   --   --  6.9  --  6.6   BILITOTAL  --   --   --  0.4  --   --   --  0.5  --  0.4   ALKPHOS  --   --   --  211*  --   --   --   216*  --  215*   ALT  --   --   --  16  --   --   --  14  --  15   AST  --   --   --  34  --   --   --  32  --  37    < > = values in this interval not displayed.       No results found for this or any previous visit (from the past 24 hour(s)).

## 2022-08-05 NOTE — CONSULTS
"Otolaryngology Consult Note  August 5, 2022      CC: \"hoarseness\"    HPI: Dandy Sands is a 61 year old male with a past medical history of CAD s/p PCI to LAD, MI, ICM, acute on chronic heart failure, s/p CRT-D, severe MR, antiphospholipid antibody syndrome on chronic warfarin, SLE, HTN, HLD, recent hospitalization 5/16-5/26 s/p RCA, LAD stenting who has been admitted for one month and undergone a complicated cardiothoracic surgical treatment course detailed in other notes. He was intubated on 7/8 and extubated on 7/21. Since extubation, he feels his voice has been softer and it is harder to understand him when he talks. Of note, Mr Sands was seen by ENT earlier during this hospital stay for management of epistaxis. He was evaluated by SLP on 8/4 for swallow function and cleared for a regular diet with thin liquids. Denies SOB, throat pain, difficulty or pain with swallowing.     Past Medical History:   Diagnosis Date     Antiphospholipid antibody syndrome (H)      CAD (coronary artery disease)      Chronic systolic heart failure (H)      Ischemic cardiomyopathy      Mitral regurgitation      Systemic lupus erythematosus (H)        Past Surgical History:   Procedure Laterality Date     COLONOSCOPY N/A 05/23/2022    Procedure: COLONOSCOPY;  Surgeon: Parth Meneses MD;  Location: [facility] OR     CV CORONARY ANGIOGRAM N/A 5/24/2022    Procedure: Coronary Angiogram;  Surgeon: Mike Pope MD;  Location:  [facility]     CV CORONARY ANGIOGRAM N/A 5/29/2022    Procedure: Coronary Angiogram;  Surgeon: Austin Bright MD;  Location: [facility] CARDIAC CATH LAB     CV CORONARY LITHOTRIPSY PCI Bilateral 5/24/2022    Procedure: Percutaneous Coronary Intervention - Lithotripsy;  Surgeon: Mike Pope MD;  Location: [facility] CARDIAC CATH LAB     CV INTRA AORTIC BALLOON N/A 6/17/2022    Procedure: Intraprocedure Aortic Balloon Pump Insertion;  Surgeon: Sherman Cerda MD;  Location: [facility] " CARDIAC CATH LAB     CV INTRA AORTIC BALLOON Right 7/3/2022    Procedure: Reposition of Subclavian Intraprocedure Aortic Balloon Pump;  Surgeon: Austin Bright MD;  Location: [facility] CARDIAC CATH LAB     CV INTRAVASULAR ULTRASOUND N/A 5/24/2022    Procedure: Intravascular Ultrasound;  Surgeon: Mike Pope MD;  Location: [facility] CARDIAC CATH LAB     CV PCI ANGIOPLASTY N/A 5/29/2022    Procedure: Percutaneous Transluminal Angioplasty;  Surgeon: Austin Bright MD;  Location: [facility] CARDIAC CATH LAB     CV PCI STENT DRUG ELUTING N/A 5/24/2022    Procedure: Percutaneous Coronary Intervention Stent;  Surgeon: Mike Pope MD;  Location: [facility] CARDIAC CATH LAB     CV RIGHT HEART CATH MEASUREMENTS RECORDED N/A 05/18/2022    Procedure: Right Heart Catheterization;  Surgeon: Justo Stewart MD;  Location: [facility] CARDIAC CATH LAB     CV RIGHT HEART CATH MEASUREMENTS RECORDED N/A 5/24/2022    Procedure: Right Heart Catheterization;  Surgeon: Mike Pope MD;  Location: [facility] CARDIAC CATH LAB     CV RIGHT HEART CATH MEASUREMENTS RECORDED N/A 5/29/2022    Procedure: Right Heart Catheterization;  Surgeon: Austin Bright MD;  Location: [facility] CARDIAC CATH LAB     CV RIGHT HEART CATH MEASUREMENTS RECORDED N/A 7/1/2022    Procedure: Right Heart Catheterization;  Surgeon: Mike Pope MD;  Location: [facility] CARDIAC CATH LAB     CV RIGHT HEART EXERCISE STRESS STUDY N/A 05/18/2022    Procedure: Stress Drug Study;  Surgeon: Justo Stewart MD;  Location: [facility] CARDIAC CATH LAB     CV SWAN CHOCO PROCEDURE N/A 6/17/2022    Procedure: [place] Choco Procedure;  Surgeon: Sherman Cerda MD;  Location: [facility] CARDIAC CATH LAB     INCISION AND DRAINAGE CHEST WASHOUT, COMBINED N/A 7/11/2022    Procedure: Chest washout and closure;  Surgeon: Darnell Morgan MD;  [facility]     INCISION AND DRAINAGE CHEST WASHOUT, COMBINED N/A 7/12/2022    Procedure: INCISION AND DRAINAGE, WOUND, CHEST, WITH  IRRIGATION;  Surgeon: Darius Zamora MD;  Location: [facility] OR     INSERT ARTERIAL LINE  7/18/2022          INSERT INTRAAORTIC BALLOON PUMP Right 6/23/2022    Procedure: INSERTION, INTRA-AORTIC BALLOON PUMP RIGHT SUBCLAVIAN ARTERY.;  Surgeon: Hayden Veras MD;  Location: [facility] OR     INSERT VENTRICULAR ASSIST DEVICE LEFT (HEARTMATE II/III) MINIMALLY INVASIVE N/A 7/8/2022    Procedure: MEDIAN STERNOTOMY.  CARDIOPULMONARY BYPASS.  INTRAOPERATIVE TRANSESOPHAGEAL ECHOCARDIOGRAM.  IMPLANT LEFT VENTRICULAR ASSIST DEVICE HEARTMATE III.  PLACEMENT OF PERCUTANEOUS RIGHT VENTRICULAR ASSIST DEVICE.  TEMPORARY CHEST CLOSURE;  Surgeon: Darius Zamora MD;  Location: [facility] OR     IR CHEST TUBE PLACEMENT NON-TUNNELED RIGHT  8/1/2022     IRRIGATION AND DEBRIDEMENT CHEST WASHOUT, COMBINED N/A 7/13/2022    Procedure: IRRIGATION AND DEBRIDEMENT, CHEST;  Surgeon: Darius Zamora MD;  Location: [facility] OR     PICC DOUBLE LUMEN PLACEMENT Right 05/28/2022    right basilic 5 fr dl picc 40 cm       No current outpatient medications on file.        No Known Allergies    Social History     Socioeconomic History     Marital status: Single     Spouse name: Not on file     Number of children: Not on file     Years of education: Not on file     Highest education level: Not on file   Occupational History     Not on file   Tobacco Use     Smoking status: Not on file     Smokeless tobacco: Not on file   Substance and Sexual Activity     Alcohol use: Not on file     Drug use: Not on file     Sexual activity: Not on file   Other Topics Concern     Not on file   Social History Narrative     Not on file     Social Determinants of Health     Financial Resource Strain: Not on file   Food Insecurity: Not on file   Transportation Needs: Not on file   Physical Activity: Not on file   Stress: Not on file   Social Connections: Not on file   Intimate Partner Violence: Not on file   Housing Stability: Not on file       No family history on  "file.    ROS: 12 point review of systems is negative unless noted in HPI.    PHYSICAL EXAM:  /89 (BP Location: Right arm)   Pulse 104   Temp 97.9  F (36.6  C) (Axillary)   Resp 18   Ht 1.702 m (5' 7\")   Wt 65.7 kg (144 lb 13.5 oz)   SpO2 95%   BMI 22.69 kg/m    General: laying in bed, no acute distress; his voice is dysarthric and soft. It is not breathy or hoarse or wet sounding.  HEAD: normocephalic, atraumatic  Face: symmetrical, CN VII intact bilaterally (HB 1)  Eyes: EOMI; bilateral ptosis  Nose: NGT in right nares; large septal defect anteriorly with crusting  Mouth: moist, no ulcers, no jaw or tooth tenderness, tongue midline and symmetric;  Tongue movements intact  Oropharynx: tonsils within normal limits, uvula midline, no oropharyngeal erythema  Neck: no LAD, trachea midline; likely stitch abscess present in right neck where line was recently removed  Neuro: cranial nerves 2-12 grossly intact  Respiratory: breathing non-labored on RA, no stridor; some stacking of breaths noted when speaking  Skin: no rashes or skin lesions of the face/neck  Psych: pleasant affect  Cardio: extremities warm and well perfused     FIBEROPTIC ENDOSCOPY:  Due to voice symptoms, fiberoptic laryngoscopy was indicated. After obtaining verbal consent, the nose was topically decongested and anesthetized. The fiberoptic laryngoscope was passed under endoscopic vision through the right nasal passage. A large septal perforation was present anteriorly. The turbinates were normal. The inferior and middle meati were clear bilaterally without purulence, masses, or polyps. The nasopharynx was clear. The eustachian tubes were clear. The soft palate appeared normal with good mobility. The epiglottis was sharp, and the visualized portion of the vallecula was clear. The larynx was clear with mobile cords. There was a small glottic gap with some minimal bowing of the cords. The arytenoids were clear, and there was no pooling in the " hypopharynx.    ROUTINE IP LABS (Last four results)  BMP  Recent Labs   Lab 08/05/22  1236 08/05/22  0759 08/05/22  0440 08/05/22  0413 08/04/22  1131 08/04/22  0745 08/03/22  0724 08/03/22  0606 08/02/22  0743 08/02/22  0455   NA  --   --  134*  --   --  128*  --  133*  --  132*   POTASSIUM  --   --  3.8  --   --  3.9  --  4.2  --  4.0   CHLORIDE  --   --  95*  --   --  92*  --  94*  --  94*   ELDA  --   --  8.6*  --   --  8.7*  --  8.7*  --  8.8   CO2  --   --  28  --   --  26  --  27  --  30*   BUN  --   --  26.6*  --   --  24.7*  --  22.6  --  21.2   CR  --   --  0.73  --   --  0.75  --  0.72  --  0.68   * 180* 129* 156*   < > 164*   < > 155*   < > 144*    < > = values in this interval not displayed.     CBC  Recent Labs   Lab 08/05/22  1036 08/05/22  0440 08/04/22  2029 08/04/22  1202 08/04/22  0745 08/03/22  1154 08/03/22  0606 08/02/22  1319 08/02/22  0455   WBC  --  10.1  --   --  10.5  --  9.5  --  9.0   RBC  --  3.14*  --   --  3.09*  --  3.07*  --  3.11*   HGB 9.2* 9.1*  9.1* 8.8* 9.3* 9.1*   < > 8.8*  8.8*   < > 9.0*  9.0*   HCT  --  28.8*  --   --  28.9*  --  29.2*  --  29.4*   MCV  --  92  --   --  94  --  95  --  95   MCH  --  29.0  --   --  29.4  --  28.7  --  28.9   MCHC  --  31.6  --   --  31.5  --  30.1*  --  30.6*   RDW  --  16.9*  --   --  17.3*  --  17.8*  --  18.0*   PLT  --  385  --   --  370  --  298  --  289    < > = values in this interval not displayed.     INR  Recent Labs   Lab 08/05/22  0440 08/04/22  0745 08/03/22  0606 08/02/22  0455   INR 2.70* 2.66* 2.36* 2.08*       Assessment and Plan  Dandy Sands is a 61 year old male currently s/p monthlong complex CT surgical hospital course now extubated with weak voice and exam consistent with deconditioning. There is no vocal cord dysfunction. His voice is weak and somewhat dysarthric. His phonation appears to mostly be limited by his pulmonary status and overall strength. He does have a very mild glottic gap due to slight bowing  of the vocal cords likely related to cord compression by ETT, and this may be contributing slightly. He has grossly normal tongue movements and evidently no difficulty with the oral phase of swallowing but his speech does have dysarthric characteristics. Global deconditioning due to his complex hospital course is likely contributing to weakness of the muscles used in phonation and articulation.    - recommend SLP consult for voice exercises  - no acute ENT intervention  - Remainder of care per primary team    The patient and plan were discussed with Dr. María Bush.    Domi Brooks PA-C  Otolaryngology-Head & Neck Surgery  Please page ENT with questions by dialing [phone] and entering job [phone] when prompted.

## 2022-08-05 NOTE — PROGRESS NOTES
[facility] General ID Service: Follow Up Note      Patient:  Dandy Sands   Date of birth 1960, Medical record number [id number]  Date of Visit:  08/05/2022  Date of Admission: 6/17/2022         Assessment and Recommendations:   ID Problem List:  1. E.faecalis on culture from PICC line (8/2/22) - 1/2 tubes in 1/4 sets collected 8/2  2. S.epidermidis (MSSE) on culture from PICC line (8/2/22) - 1/2 tubes in 1/4 sets collected 8/2  3. S/p HM3 LVAD (7/8/22)  4. HFrEF due to ICM  5. SLE on hydroxychloroquine    Recommendations:  1. Continue vancomycin, plan for 5 days of therapy (8/3-8/7)  2. Does not need additional blood cultures or cardiac imaging related to positive cultures from 8/2  3. ID will sign off at this time, please don't hesitate to contact the [facility] General ID team should further questions arise.      Discussion:  aDndy Sands is a 61 year old male with history of SLE, antiphospholipid antibody syndrome on warfarin, CAD, HFrEF due to ICM, severe MR, who transferred to [facility] from [facility] on 6/17/22 with acute decompensated heart failure and cardiogenic shock. Recent hospitalization (5/16-5/26) for CAD with ischemic lesions and mitral insufficiency. Briefly, had IABP placed 6/23/22, HeartMate 3 LVAD placed 7/8/22, course c/b need for RVAD (7/8-7/11), pericardial tamponade.  Previous sputum with E.faecalis (pan-S), E.coli (R: ampicillin, Unasyn, ceftazidime, ceftriaxone, Bactrim). Has had daily blood cultures x2 sets since 7/9/22. Culture from PICC line on 8/2 with E.faecalis (pan-S) on day #1; a second set of blood cultures was collected from PICC line on same day and now positive for S.epidermidis. PICC line has been removed. Afebrile with stable vitals. WBC is WNL at 9.5K, CRP elevated at 141, however no recent baseline available. CXR obtained on 8/3 due to tachypnea, no infiltrate to suggest pneumonia, respiratory status improved on 8/4.     Possibility of line colonization vs true  bacteremia, awaiting additional cultures. Vancomycin started 8/3. As there are no additional positives, favor a short 5-day course for colonized line as long as peripheral cultures remain negative. At this time, does not need cardiac imaging related to positive blood culture as there is a very clearly established onset of positivity of line and there are no positive peripheral blood cultures.       Recs were discussed with primary team today. Don't hesitate to call with questions.     Attestation:  I have reviewed today's vital signs, medications, labs and imaging.  Grazyna Millan PA-C, Pager # [phone]            Interval History:       Drowsy this AM (got pain meds shortly before visit) but more interactive than prior. States he was able to sleep last night so feeling better this morning. Breathing is feeling better, now with just occasional shortness of breath. Not coughing as much. Had breakfast and has been talking with sitter. No fevers.          Review of Systems:   Focused 5 pt ROS obtained, pertinent positives and negatives as above.          Current Antimicrobials   Current:  - Vancomycin (8/3-present)    Prior:  - Zosyn (7/25-7/26; 7/15-7/22)  - Vancomycin (7/25; 7/8-7/14)  - cefepime (7/8-7/14)  - fluconazole (7/8-7/10)  - Rifampin (7/9-7/10)  - levofloxacin (7/8)       Physical Exam:   Ranges for vital signs:  Temp:  [97.3  F (36.3  C)-97.9  F (36.6  C)] 97.9  F (36.6  C)  Pulse:  [101-116] 104  Resp:  [18-20] 18  BP: ()/(64-90) 100/89  SpO2:  [92 %-99 %] 95 %    Intake/Output Summary (Last 24 hours) at 8/4/2022 1425  Last data filed at 8/4/2022 1301  Gross per 24 hour   Intake 1575 ml   Output 850 ml   Net 725 ml     Exam:  GENERAL: Drowsy, awakes to voice. Propped up in bed.  ENT:  Head is normocephalic, atraumatic. NGT in place.  EYES:  Eyes have anicteric sclerae.    LUNGS:  Normal respiratory effort on RA.  EXT: Extremities without edema.  SKIN:  No acute rashes. Mild erythema at site of  previous right sided neck access, no drainage. PIV in place without any surrounding erythema.         Laboratory Data:   Reviewed.  Pertinent for:    Culture data:  Microbiology:  Culture   Date Value Ref Range Status   08/04/2022 No growth after 1 day  Preliminary   08/04/2022 No growth after 1 day  Preliminary   08/03/2022 No Growth  Final   08/03/2022 No growth after 1 day  Preliminary   08/03/2022 No growth after 1 day  Preliminary   08/02/2022 No growth after 2 days  Preliminary   08/02/2022 Positive on the 1st day of incubation (A)  Preliminary   08/02/2022 Staphylococcus epidermidis (AA)  Preliminary     Comment:     1 of 2 bottles   08/02/2022 Positive on the 1st day of incubation (A)  Final   08/02/2022 Enterococcus faecalis (AA)  Final     Comment:     1 of 2 bottles   08/01/2022 No growth after 4 days  Preliminary   07/31/2022 No Growth  Final   07/30/2022 No Growth  Final   07/29/2022 No Growth  Final   07/28/2022 No Growth  Final   07/28/2022 No anaerobic organisms isolated  Final   07/28/2022 No Growth  Final   07/27/2022 No Growth  Final   07/25/2022 No Growth  Final   07/25/2022 No Growth  Final   07/25/2022 No Growth  Final   07/24/2022 No Growth  Final   07/23/2022 No Growth  Final   07/22/2022 No Growth  Final   07/21/2022 No Growth  Final   07/21/2022 No Growth  Final   07/21/2022 No Growth  Final   07/20/2022 No Growth  Final   07/19/2022 No Growth  Final   07/18/2022 No Growth  Final   07/17/2022 No Growth  Final   07/16/2022 No Growth  Final   07/15/2022 No Growth  Final   07/13/2022 No Growth  Final   07/13/2022 No Growth  Final   07/13/2022 No Growth  Final   07/12/2022 No Growth  Final   07/12/2022 4+ Candida albicans (A)  Final     Comment:     Susceptibilities not routinely done   07/12/2022 1+ Escherichia coli (A)  Final   07/12/2022 3+ Enterococcus faecalis (A)  Final   07/12/2022 No Growth  Final   07/12/2022 No Growth  Final   07/11/2022 No Growth  Final   07/10/2022 No Growth  Final    07/09/2022 No Growth  Final   05/19/2022 No Growth  Final       Inflammatory Markers    Recent Labs   Lab Test 08/03/22  0606 06/19/22  1522   SED  --  39*   .00* 28.0*       Hematology Studies    Recent Labs   Lab Test 08/05/22  1036 08/05/22  0440 08/04/22 2029 08/04/22  1202 08/04/22  0745 08/03/22  1154 08/03/22  0606 08/02/22  1319 08/02/22  0455   WBC  --  10.1  --   --  10.5  --  9.5  --  9.0   HGB 9.2* 9.1*  9.1* 8.8* 9.3* 9.1*   < > 8.8*  8.8*   < > 9.0*  9.0*   MCV  --  92  --   --  94  --  95  --  95   PLT  --  385  --   --  370  --  298  --  289    < > = values in this interval not displayed.     Recent Labs   Lab Test 07/20/22  0949 07/20/22  0355 07/19/22 2049 07/19/22  0946   ANEU 14.6* 11.8* 19.5* 16.3*   AEOS 0.5 0.1 0.4 0.2       Metabolic Studies     Recent Labs   Lab Test 08/05/22  0440 08/04/22  0745 08/04/22  0056 08/03/22  0606 08/02/22  0455 06/19/22  2157 06/19/22  1522 05/20/22  1715 05/20/22  0516   * 128*  --  133* 132*   < > 138   < > 138   POTASSIUM 3.8 3.9  --  4.2 4.0   < > 4.0   < > 4.2   CHLORIDE 95* 92*  --  94* 94*   < > 107   < > 105   CO2 28 26  --  27 30*   < > 26   < > 24   BUN 26.6* 24.7*  --  22.6 21.2   < > 10   < > 16   CR 0.73 0.75  --  0.72 0.68   < > 0.78   < > 0.92   GFRESTIMATED >90 >90  --  >90 >90   < > >90   < > >90   ANIONGAP 11 10  --  12 8   < > 5   < > 9   A1C  --   --   --   --   --   --   --   --  5.5   LACT 1.1  --  1.5 1.4 1.1   < > 1.1   < >  --    CKT  --   --   --   --   --   --  37  --   --     < > = values in this interval not displayed.       Hepatic Studies    Recent Labs   Lab Test 08/05/22  0440 08/04/22  0745 08/03/22  0606 07/08/22  0332 07/07/22  0356   BILITOTAL 0.4 0.5 0.4   < > 0.3   ALKPHOS 211* 216* 215*   < > 125   ALBUMIN 3.0* 2.9* 2.9*   < > 3.5   AST 34 32 37   < > 32   ALT 16 14 15   < > 41   LDH  --   --   --   --  259*    < > = values in this interval not displayed.       Urine Studies     Recent Labs   Lab Test  08/03/22  1110 07/21/22  2239 07/12/22  1045 05/19/22  1810   URINEPH 7.0 5.5 5.5 6.0   NITRITE Negative Negative Negative Negative   LEUKEST Negative Negative Negative Negative   WBCU 21* 1 7* 0       Last check of C difficile  C Difficile Toxin B by PCR   Date Value Ref Range Status   07/18/2022 Negative Negative Final     Comment:     A negative result does not exclude actual disease due to C. difficile and may be due to improper collection, handling and storage of the specimen or the number of organisms in the specimen is below the detection limit of the assay.       Drug Level Monitoring  Recent Labs   Lab Test 08/04/22  0744   VANCOMYCIN 32.6*       Hepatitis B Testing   Recent Labs   Lab Test 05/20/22  0516   HBCAB Nonreactive   HEPBANG Nonreactive       Hepatitis C Testing     Hepatitis C Antibody   Date Value Ref Range Status   05/20/2022 Nonreactive Nonreactive Final              Imaging:     CXR (8/3/2022)  FINDINGS: Heart borderline enlarged. LVAD. Implantable cardiac  defibrillator. Right basilar chest catheter. Right PICC tip in the  proximal SVC. Median sternotomy. Feeding tube beyond the inferior  margin of the image. Previous right apical pneumothorax less  conspicuous.                                                        IMPRESSION: Slight decrease in conspicuity of right apical  pneumothorax, otherwise grossly stable.     CTA Chest/Abd/ Pelvis (7/12/22)  IMPRESSION:  1. Moderate to large hemopericardium centered predominantly about the  LVAD outflow tract without focal contrast extravasation identified.   2. Cardiomegaly with expected postoperative changes of recent  sternotomy closure.  3. Mild pulmonary edema with moderate right and small left pleural  effusions and associated atelectasis.  4. Tiny left pneumothorax with chest tube in place.  5. Probable small splenic infarct.  6. Additional incidental/chronic findings as noted above.

## 2022-08-05 NOTE — PLAN OF CARE
Goal Outcome Evaluation:    Neuro: Orientation waxes and wanes. Intermittently confused  throughout the night.   Cardiac: HM 3 LVAD. Values WDL. ST. Rates 100-110s. Afebrile.  Respiratory: Sating >92% on RA. Dyspnea on exertion.  GI/: AUOP. BM x1 overnight.  Diet/appetite: Tolerating cyclical TF. Running from 6p-10a at 75 mls/hour via NJ with FWF 30 ml Q4H. Regular diet. Plans to order breakfast this morning.   Activity:  Assist of 1-2 out of bed.   Pain: At acceptable level on current regimen.  Skin: No new deficits noted.  Mepilex CDI.   LDA's: HM3, NJ, PIV    Plan: Continue with POC. Notify primary team with changes.

## 2022-08-05 NOTE — PROGRESS NOTES
D: Stopped by to see patient and evaluate if appropriate for education. Patient was alert and appropriate. He was about to begin OT. No VAD related questions or concerns at this time.   I: Discussed POC and predicted that we will be starting education on Tuesday or Wednesday of next week. Writer will contact his caregivers to find a time that works best for all parties.   P: Continue to follow patient and address any questions or concerns patient and or caregiver may have.

## 2022-08-05 NOTE — PROGRESS NOTES
CVTS PROGRESS NOTE  08/05/2022      ASSESSMENT:   Dandy Sands is a 61 year old male with a PMH of CAD s/p PCI to LAD, MI, ICM, acute on chronic heart failure, s/p CRT-D, severe MR, antiphospholipid antibody syndrome on chronic warfarin, SLE, HTN, HLD, recent hospitalization 5/16-5/26 s/p RCA, LAD stenting who underwent RVAD (29F Protek duo RIJ) LVAD placement (HeartMate 3) on 7/8/22 by Dr. Zamora. Intraoperative course complicated by IABP dysfunction and RV failure, requiring crash onto bypass / vasopressor support, NO, and protek placement. Now s/p chest closure and NO wean 7/11. Return to OR for hemopericardium (7/12) and chest re-closure 7/13. Protek RVAD removed 7/21.    PLAN:   Neuro/ pain/ sedation:  Acute Postoperative pain  Depression   Anxiety due to a medical condition  Insomnia  Delirium   Delirious intermittently; patient is not sleeping at all at night and this is likely contributing significantly to his delirium and confusion. He is also very anxious at night and early in the morning. Continue 1:1 sitter for impulsivity.   - Monitor neurological status. Notify the MD for any acute changes in exam.  - Recent fall on 7/29 and increased delirium over weekend; CT scan of head on 8/1 did not show any acute intracranial pathologies, ABG was also not significant.  - Pain:         -Scheduled tylenol. Scheduled oxycodone 5mg q 8 hr discontinued on 8/2        -PRN oxycodone 5mg q4h, dilaudid 0.2 mg q 2 hours PRN  - Depression:         - Zoloft 50 mg started on 8/2        - Duloxetine 30 mg discontinued on 8/2  - Sleep:         - Melatonin 10 mg HS, benadryl 25 mg HS PRN.  Failed trazodone and zyprexa trials.        - Seroquel 25 mg in AM and 4 pm and Seroquel  mg at 8 pm. Added as of psych consult on 8/3.         - Added zolpidem 5 mg HS on 8/4 - has been helping patient sleep as of 8/5.     - Anxiety:         - Valproate po 250 mg BID and 500mg Bedtime         - Hydroxyzine PRN discontinued on 8/2  due to anticholinergic effects         - Ativan 0.5-1.0mg IV q6h PRN added for anxiety, discontinued on 8/2 for worsening confusion  - PTA meds: hydroxyzine 25mg PRN, zolpidem 5mg, melatonin 10mg, lorazepam 0.5mg  - QTc(7/18)- 683 msec, no haldol     Pulmonary:   Acute hypoxic respiratory failure on iMV  Mild-moderate emphysema  History of COVID-19  - Saturating well on room air; was previously on nasal cannula 2 LPM.  - Maintain SpO2> 92%  - Incentive spirometry   - Right chest tube placed by IR via ultrasound on 8/1 for moderate pleural effusion; chest x-ray post-procedure showed a small right pneumothorax, which has improved as of chest x-ray on 8/3.   -Right chest tube removed on 8/3 with no complications. Obtained a chest x-ray pre and post removal which shows a stable right apical pneumothorax.    Cardiovascular:    S/p LVAD placement (HeartMate 3) on 7/8/22   S/p RVAD (29F Protek duo RIJ) on 7/8/22  S/p chest closure 7/11  Cardiogenic shock  Advanced ischemic cardiomyopathy, class IV, stage D  CAD s/p PCI to LAD (2005)  S/p CRT-D (2006)  History of MI (2007)  Severe MR  Antiphospholipid antibody syndrome on chronic warfarin  HTN, HLD  Recent hospitalization 5/16-5/26 s/p RCA, LAD stenting  Atrial fibrillation   RV mobile clot   - Monitor hemodynamic status. Chest closure and oxygenator splicing 7/11. Reopened 7/12 for I&D and left open, closed 7/13. RVAD removed 7/21.  - Goal MAP > 65   - Pulse continues in sinus tachy, HR in 100's; continue to monitor   - PTA meds(held): clopidogrel 75mg, lasix 40mg, warfarin 5mg  - LVAD management per [facility] service  - Aspirin 81 mg  - Atorvastatin 40 mg    - Hydralazine 10-20 mg q 4 hours PRN for MAP >90  - Captopril 12.5 mg TID dose titration per cardiology   - Anticoagulation with Warfarin, dosing per pharmacy. INR goal 2-3.   - Echocardiogram ordered on 8/2 for status of RV clot due to persistent shortness of breath, no thrombus in the right ventricle but has a  dilated IVC.     GI / Nutrition:   GERD  Congestive hepatopathy in the setting of cardiac insuffiencey - Resolved  Hematemesis / oral mucosal bleeding -resolved    - Full liquid diet, SLP following  - Continue NJT at goal   - Nutrition following  - Bowel regimen prn (miralax / senna)  - Trend LFT's every other day   - GI consulted 7/31 for black tarry stools and a possible GI bleed, however it is more likely swallowed blood from epistaxis that patient previously had. GI does not recommend an upper endoscopy at this time.   - 8/3 Patient reported trouble swallowing yesterday evening; speech therapy consult ordered on 8/3. Will place patient NPO and cycle tube feeds at 75 ml/hr with 30 ml FWF Q 4hrs from 6pm - 10 am. Thrush also noted on exam 8/3, this is improving as of 8/5  -8/4 Speech consult cleared patient for full diet with thin liquids. Patient must be up in his chair when eating or drinking to prevent any aspirations.   - 8/5 decrease IV Protonix to once daily per GI; also added calorie counts.    Renal/ Fluid Balance:    - Strict I/O, daily weights   - Avoid/limit nephrotoxins as able  - Replete lytes PRN per protocol  - PTA meds: tamsulosin 0.4mg (held)  - Goal net negative ~ 1L   Bumex gtt for part of 7/24: total UOP 8L, gtt stopped   Bumex 4mg x2 7/25 with over 6L UOP  - Bumex 3mg x3 7/27 and 7/28 with good diuresis, continue bumex 3 mg IV increased to TID on 7/31 given SOB.   -8/3 Decrease bumex 3 mg IV TID to bumex 3 mg PO BID per cardiology with no potassium supplementation due to normal levels, marcelo removed today with no complications with UA ordered.   - 8/4 Will go back to bumex 3 mg IV TID with potassium supplementation due to signs and symptoms of fluid overload from oral bumex change above    Endocrine:    Stress induced hyperglycemia  Preop A1c 5.5%  - Insulin glargine 20U and high sliding scale  - Goal BG <180 for optimal healing.  - Held glargine since 7/29 in anticipation of possibly cycling  tube feeding, has had minimal insulin requirements 7/29 with TF running. Will continue to hold and plan to start cycle feeds 7/30. Restart NPH vs Lantus if sugars remain elevated.     ID/ Antibiotics:  Perioperative antibiosis (s/p course of Cefepime, vancomycin, rifampin, fluconazole)  HAP - Enterococcus faecalis PNA, s/p treatment   - 7/12 Pan culture: Bcx x2, UA/UC -> negative  - 7/14 Endotracheal sputum culture (4+ candida albicans, 1+ E.Coli, 3+ enterococcus faecalis)   - Zosyn for HAP(7/15-7/22)  - 7/18 C.diff- negative.  - 7/25 Blood cultures drawn for unexplained hypotension, NGTD  - 8/3 PICC line culture on 8/2 grew gram positive cocci in pairs and chairs (E. Faecalis and S. Epidermidis); ID consulted and recommeds removal of PICC (removed and cultured), vancomycin 750 mg IV every 12 hours for a short five day course (8/3 - 8/7), will recheck levels in 1-3 days and serum creatinine levels ordered daily for first week of therapy and at least twice weekly for subsequent weeks. Blood cultures are pending as of 8/5.    - Nystatin swish and swallow for thrush 8/3.     Heme:     Acute blood loss anemia  Acute blood loss thrombocytopenia  History of DVT and PE   Antiphospholipid antibody syndrome  History of iron deficiency anemia  - Monitor for s/sx of bleeding  - Trend CBC and coags  - Hgb goal >8.  Hgb stable in 8-9's, no signs or symptoms of bleeding.   - Plt goal > 20  - History of nose bleed on 7/30, lost 2 units of blood. Transfused for hgb 6.8, 2U received. Recheck ordered and has been stable in 8-9's; will continue to monitor daily   - Melena reported on 7/30 - 3 stools over night, see GI above, stools have been brown in color as of 8/2.   - Pharmacy to dose coumadin, therapeutic.     Rheumatology:  SLE  - Chronic: symptoms include arthritis, photosensitivity, fatigue, and skin manifestations  - PTA meds: hydroxychloroquine 200mg, resumed 7/16  - Rheumatology consulted and following. Follow dsDNA and  complement levels(7/18)   - Will need cellcept restarted when appropriate     ENT:  Dysphonia  Hoarse voice  - Trouble speaking and hoarse voice after surgery  - ENT consult on 8/5 for possible vocal cord injury and consideration of VC injection. Had laryngoscopy on 8/5, no VC injury or dysfunction, recommended continued therapy with Speech team.     Prophylaxis:    - DVT: SCD + warfarin  - PPI    Disposition:  - Transfer to  on 7/28  - Son updated today 8/5    LENA Sebastian     Discussed with Dr. Zamora    I have seen and examined this patient with the PA student, I agree with the above findings.  Patient discussed with staff.     Ld Espinoza PA-C  Cardiothoracic Surgery  Pager [phone]    1:28 PM   August 5, 2022  ----------------------------------------------------------------    Interval Events:    No overnight events. Patient finally was able to sleep last night and is feeling more rested and less anxious this morning. He notes still being short of breath at times, but reports his cough has improved. He states he does feel dizzy when he first gets up to work with therapy and to move from the bed to chair, but reports this goes away. Patient also notes discomfort around his sternum and chronic right shoulder and back pain, but states the pain is well managed on current regimen. Breathing is stable on room air. Tolerating NJ tube feeds, still reports feeling nauseous, trouble with talking, and a hoarse voice. Nutrition is primarily through tube feeds. Patient is having regular bowel movements and denies any abdominal pain or vomiting. Patient also denies chest pain, palpitations, syncopal symptoms, chills, myalgias, or sternal popping/clicking.       OBJECTIVE:   1. VITAL SIGNS:   Temp:  [97  F (36.1  C)-97.9  F (36.6  C)] 97.9  F (36.6  C)  Pulse:  [101-116] 104  Resp:  [18-20] 20  BP: ()/(64-95) 88/64  SpO2:  [92 %-100 %] 95 %  Resp: 20      2. INTAKE/ OUTPUT:   I/O last 3 completed  shifts:  In: 1530 [NG/GT:330]  Out: 2550 [Urine:2550]    3. PHYSICAL EXAMINATION:   General: Sitting in bed, appears alert and well rested  ENT: Trace white spots on palate and tongue consistent with oral thrush, has significantly improved from 8/3  Neuro: Follows commands, no focal deficits noted  Resp: RRR on room air. No wheezing, crackles, or rhonchi noted in anterior upper and lower lung fields bilaterally  CV: Tachycardic, LVAD hum noted. No murmurs, gallops, or friction rubs. +JVD   Abdomen: Soft, non-distended, non-tender. Bowel sounds present in all quadrants   Incisions: C/d/i, no erythema   Extremities: Warm and well perfused, no edema of lower extremities bilaterally  LVAD Flow 3.4 - 3.7, PI 5.3 - 6.2, Speed 5300, Power 3.8 - 3.9     4. INVESTIGATIONS:   Arterial Blood Gases   Recent Labs   Lab 08/01/22  2102 08/01/22  1719   PH 7.52* 7.52*   PCO2 37 38   PO2 61* 44*   HCO3 30* 31*     Complete Blood Count   Recent Labs   Lab 08/05/22  0440 08/04/22  2029 08/04/22  1202 08/04/22  0745 08/03/22  1154 08/03/22  0606 08/02/22  1319 08/02/22  0455   WBC 10.1  --   --  10.5  --  9.5  --  9.0   HGB 9.1*  9.1* 8.8* 9.3* 9.1*   < > 8.8*  8.8*   < > 9.0*  9.0*     --   --  370  --  298  --  289    < > = values in this interval not displayed.     Basic Metabolic Panel  Recent Labs   Lab 08/05/22  0759 08/05/22  0440 08/05/22  0413 08/04/22  2349 08/04/22  1131 08/04/22  0745 08/03/22  0724 08/03/22  0606 08/02/22  0743 08/02/22  0455   NA  --  134*  --   --   --  128*  --  133*  --  132*   POTASSIUM  --  3.8  --   --   --  3.9  --  4.2  --  4.0   CHLORIDE  --  95*  --   --   --  92*  --  94*  --  94*   CO2  --  28  --   --   --  26  --  27  --  30*   BUN  --  26.6*  --   --   --  24.7*  --  22.6  --  21.2   CR  --  0.73  --   --   --  0.75  --  0.72  --  0.68   * 129* 156* 107*   < > 164*   < > 155*   < > 144*    < > = values in this interval not displayed.     Liver Function Tests  Recent Labs    Lab 08/05/22 0440 08/04/22  0745 08/03/22  0606 08/02/22  0455   AST 34 32 37 30   ALT 16 14 15 13   ALKPHOS 211* 216* 215* 215*   BILITOTAL 0.4 0.5 0.4 0.4   ALBUMIN 3.0* 2.9* 2.9* 2.9*   INR 2.70* 2.66* 2.36* 2.08*     Pancreatic Enzymes  No lab results found in last 7 days.  Coagulation Profile  Recent Labs   Lab 08/05/22 0440 08/04/22 0745 08/03/22  0606 08/02/22  0455   INR 2.70* 2.66* 2.36* 2.08*         5. RADIOLOGY:   No results found for this or any previous visit (from the past 24 hour(s)).    =========================================

## 2022-08-06 ENCOUNTER — APPOINTMENT (OUTPATIENT)
Dept: SPEECH THERAPY | Facility: CLINIC | Age: 62
DRG: 001 | End: 2022-08-06
Attending: INTERNAL MEDICINE
Payer: COMMERCIAL

## 2022-08-06 LAB
ALBUMIN SERPL BCG-MCNC: 3 G/DL (ref 3.5–5.2)
ALP SERPL-CCNC: 203 U/L (ref 40–129)
ALT SERPL W P-5'-P-CCNC: 19 U/L (ref 10–50)
ANION GAP SERPL CALCULATED.3IONS-SCNC: 9 MMOL/L (ref 7–15)
AST SERPL W P-5'-P-CCNC: 34 U/L (ref 10–50)
BACTERIA BLD CULT: NO GROWTH
BILIRUB SERPL-MCNC: 0.4 MG/DL
BUN SERPL-MCNC: 28.1 MG/DL (ref 8–23)
CA-I BLD-MCNC: 4.4 MG/DL (ref 4.4–5.2)
CALCIUM SERPL-MCNC: 8.5 MG/DL (ref 8.8–10.2)
CHLORIDE SERPL-SCNC: 93 MMOL/L (ref 98–107)
CREAT SERPL-MCNC: 0.72 MG/DL (ref 0.67–1.17)
DEPRECATED HCO3 PLAS-SCNC: 29 MMOL/L (ref 22–29)
ERYTHROCYTE [DISTWIDTH] IN BLOOD BY AUTOMATED COUNT: 16.9 % (ref 10–15)
GFR SERPL CREATININE-BSD FRML MDRD: >90 ML/MIN/1.73M2
GLUCOSE BLDC GLUCOMTR-MCNC: 120 MG/DL (ref 70–99)
GLUCOSE BLDC GLUCOMTR-MCNC: 125 MG/DL (ref 70–99)
GLUCOSE BLDC GLUCOMTR-MCNC: 127 MG/DL (ref 70–99)
GLUCOSE BLDC GLUCOMTR-MCNC: 144 MG/DL (ref 70–99)
GLUCOSE BLDC GLUCOMTR-MCNC: 146 MG/DL (ref 70–99)
GLUCOSE BLDC GLUCOMTR-MCNC: 153 MG/DL (ref 70–99)
GLUCOSE SERPL-MCNC: 129 MG/DL (ref 70–99)
HCT VFR BLD AUTO: 28.2 % (ref 40–53)
HGB BLD-MCNC: 8.3 G/DL (ref 13.3–17.7)
HGB BLD-MCNC: 8.6 G/DL (ref 13.3–17.7)
HGB BLD-MCNC: 8.7 G/DL (ref 13.3–17.7)
HGB BLD-MCNC: 8.7 G/DL (ref 13.3–17.7)
INR PPP: 2.81 (ref 0.85–1.15)
LACTATE SERPL-SCNC: 1.1 MMOL/L (ref 0.7–2)
MAGNESIUM SERPL-MCNC: 1.8 MG/DL (ref 1.7–2.3)
MCH RBC QN AUTO: 28.8 PG (ref 26.5–33)
MCHC RBC AUTO-ENTMCNC: 30.9 G/DL (ref 31.5–36.5)
MCV RBC AUTO: 93 FL (ref 78–100)
PHOSPHATE SERPL-MCNC: 4.7 MG/DL (ref 2.5–4.5)
PLATELET # BLD AUTO: 390 10E3/UL (ref 150–450)
POTASSIUM SERPL-SCNC: 3.8 MMOL/L (ref 3.4–5.3)
PROT SERPL-MCNC: 6.8 G/DL (ref 6.4–8.3)
RBC # BLD AUTO: 3.02 10E6/UL (ref 4.4–5.9)
SODIUM SERPL-SCNC: 131 MMOL/L (ref 136–145)
WBC # BLD AUTO: 8.4 10E3/UL (ref 4–11)

## 2022-08-06 PROCEDURE — 83605 ASSAY OF LACTIC ACID: CPT | Performed by: SURGERY

## 2022-08-06 PROCEDURE — 93750 INTERROGATION VAD IN PERSON: CPT | Performed by: INTERNAL MEDICINE

## 2022-08-06 PROCEDURE — 250N000013 HC RX MED GY IP 250 OP 250 PS 637: Performed by: STUDENT IN AN ORGANIZED HEALTH CARE EDUCATION/TRAINING PROGRAM

## 2022-08-06 PROCEDURE — 84100 ASSAY OF PHOSPHORUS: CPT | Performed by: SURGERY

## 2022-08-06 PROCEDURE — 36415 COLL VENOUS BLD VENIPUNCTURE: CPT | Performed by: SURGERY

## 2022-08-06 PROCEDURE — 250N000013 HC RX MED GY IP 250 OP 250 PS 637: Performed by: SURGERY

## 2022-08-06 PROCEDURE — 250N000011 HC RX IP 250 OP 636: Performed by: PHYSICIAN ASSISTANT

## 2022-08-06 PROCEDURE — 82330 ASSAY OF CALCIUM: CPT | Performed by: SURGERY

## 2022-08-06 PROCEDURE — 36415 COLL VENOUS BLD VENIPUNCTURE: CPT | Performed by: PHYSICIAN ASSISTANT

## 2022-08-06 PROCEDURE — 85027 COMPLETE CBC AUTOMATED: CPT | Performed by: SURGERY

## 2022-08-06 PROCEDURE — 250N000013 HC RX MED GY IP 250 OP 250 PS 637: Performed by: THORACIC SURGERY (CARDIOTHORACIC VASCULAR SURGERY)

## 2022-08-06 PROCEDURE — C9113 INJ PANTOPRAZOLE SODIUM, VIA: HCPCS | Performed by: PHYSICIAN ASSISTANT

## 2022-08-06 PROCEDURE — 250N000013 HC RX MED GY IP 250 OP 250 PS 637: Performed by: INTERNAL MEDICINE

## 2022-08-06 PROCEDURE — 120N000003 HC R&B IMCU UMMC

## 2022-08-06 PROCEDURE — 85610 PROTHROMBIN TIME: CPT | Performed by: SURGERY

## 2022-08-06 PROCEDURE — 80053 COMPREHEN METABOLIC PANEL: CPT | Performed by: SURGERY

## 2022-08-06 PROCEDURE — 258N000003 HC RX IP 258 OP 636: Performed by: PHYSICIAN ASSISTANT

## 2022-08-06 PROCEDURE — 99233 SBSQ HOSP IP/OBS HIGH 50: CPT | Mod: 25 | Performed by: INTERNAL MEDICINE

## 2022-08-06 PROCEDURE — 83735 ASSAY OF MAGNESIUM: CPT | Performed by: SURGERY

## 2022-08-06 PROCEDURE — 92526 ORAL FUNCTION THERAPY: CPT | Mod: GN

## 2022-08-06 PROCEDURE — 85018 HEMOGLOBIN: CPT | Performed by: PHYSICIAN ASSISTANT

## 2022-08-06 PROCEDURE — 250N000013 HC RX MED GY IP 250 OP 250 PS 637: Performed by: PHYSICIAN ASSISTANT

## 2022-08-06 RX ORDER — MAGNESIUM OXIDE 400 MG/1
400 TABLET ORAL EVERY 4 HOURS
Status: COMPLETED | OUTPATIENT
Start: 2022-08-06 | End: 2022-08-06

## 2022-08-06 RX ORDER — POTASSIUM CHLORIDE 750 MG/1
20 TABLET, EXTENDED RELEASE ORAL ONCE
Status: COMPLETED | OUTPATIENT
Start: 2022-08-06 | End: 2022-08-06

## 2022-08-06 RX ADMIN — LOPERAMIDE HCL 2 MG: 1 SOLUTION ORAL at 17:45

## 2022-08-06 RX ADMIN — SERTRALINE HYDROCHLORIDE 50 MG: 50 TABLET ORAL at 10:06

## 2022-08-06 RX ADMIN — NYSTATIN 1000000 UNITS: 100000 SUSPENSION ORAL at 19:52

## 2022-08-06 RX ADMIN — BUMETANIDE 3 MG: 1 TABLET ORAL at 15:53

## 2022-08-06 RX ADMIN — INSULIN ASPART 1 UNITS: 100 INJECTION, SOLUTION INTRAVENOUS; SUBCUTANEOUS at 23:40

## 2022-08-06 RX ADMIN — CAPTOPRIL 12.5 MG: 12.5 TABLET ORAL at 13:04

## 2022-08-06 RX ADMIN — ASPIRIN 81 MG CHEWABLE TABLET 81 MG: 81 TABLET CHEWABLE at 10:06

## 2022-08-06 RX ADMIN — POTASSIUM CHLORIDE 40 MEQ: 1.5 POWDER, FOR SOLUTION ORAL at 08:46

## 2022-08-06 RX ADMIN — Medication 400 MG: at 10:06

## 2022-08-06 RX ADMIN — DIGOXIN 125 MCG: 125 TABLET ORAL at 10:06

## 2022-08-06 RX ADMIN — WARFARIN SODIUM 9 MG: 7.5 TABLET ORAL at 19:12

## 2022-08-06 RX ADMIN — BUMETANIDE 3 MG: 0.25 INJECTION, SOLUTION INTRAMUSCULAR; INTRAVENOUS at 09:35

## 2022-08-06 RX ADMIN — VANCOMYCIN HYDROCHLORIDE 750 MG: 10 INJECTION, POWDER, LYOPHILIZED, FOR SOLUTION INTRAVENOUS at 05:47

## 2022-08-06 RX ADMIN — NYSTATIN 1000000 UNITS: 100000 SUSPENSION ORAL at 13:11

## 2022-08-06 RX ADMIN — ACETAMINOPHEN 975 MG: 325 TABLET, FILM COATED ORAL at 15:53

## 2022-08-06 RX ADMIN — QUETIAPINE FUMARATE 100 MG: 50 TABLET, EXTENDED RELEASE ORAL at 21:00

## 2022-08-06 RX ADMIN — HYDROMORPHONE HYDROCHLORIDE 0.2 MG: 0.2 INJECTION, SOLUTION INTRAMUSCULAR; INTRAVENOUS; SUBCUTANEOUS at 06:49

## 2022-08-06 RX ADMIN — OLANZAPINE 5 MG: 5 TABLET, FILM COATED ORAL at 08:47

## 2022-08-06 RX ADMIN — QUETIAPINE FUMARATE 50 MG: 50 TABLET ORAL at 04:27

## 2022-08-06 RX ADMIN — NYSTATIN 1000000 UNITS: 100000 SUSPENSION ORAL at 15:56

## 2022-08-06 RX ADMIN — OXYCODONE HYDROCHLORIDE 5 MG: 5 TABLET ORAL at 08:10

## 2022-08-06 RX ADMIN — Medication 1 PACKET: at 13:05

## 2022-08-06 RX ADMIN — ATORVASTATIN CALCIUM 40 MG: 40 TABLET, FILM COATED ORAL at 19:54

## 2022-08-06 RX ADMIN — OXYCODONE HYDROCHLORIDE 5 MG: 5 TABLET ORAL at 23:40

## 2022-08-06 RX ADMIN — PANTOPRAZOLE SODIUM 40 MG: 40 INJECTION, POWDER, FOR SOLUTION INTRAVENOUS at 08:52

## 2022-08-06 RX ADMIN — POTASSIUM CHLORIDE 40 MEQ: 1.5 POWDER, FOR SOLUTION ORAL at 19:52

## 2022-08-06 RX ADMIN — HYDROMORPHONE HYDROCHLORIDE 0.2 MG: 0.2 INJECTION, SOLUTION INTRAMUSCULAR; INTRAVENOUS; SUBCUTANEOUS at 10:27

## 2022-08-06 RX ADMIN — Medication 1 PACKET: at 19:53

## 2022-08-06 RX ADMIN — ZOLPIDEM TARTRATE 5 MG: 5 TABLET, FILM COATED ORAL at 21:00

## 2022-08-06 RX ADMIN — ACETAMINOPHEN 975 MG: 325 TABLET, FILM COATED ORAL at 08:45

## 2022-08-06 RX ADMIN — CAPTOPRIL 12.5 MG: 12.5 TABLET ORAL at 10:06

## 2022-08-06 RX ADMIN — HYDROXYCHLOROQUINE SULFATE 200 MG: 200 TABLET, FILM COATED ORAL at 10:06

## 2022-08-06 RX ADMIN — HYDROXYCHLOROQUINE SULFATE 200 MG: 200 TABLET, FILM COATED ORAL at 19:52

## 2022-08-06 RX ADMIN — Medication 1 PACKET: at 19:55

## 2022-08-06 RX ADMIN — OLANZAPINE 5 MG: 5 TABLET, FILM COATED ORAL at 19:55

## 2022-08-06 RX ADMIN — Medication 10 MG: at 21:00

## 2022-08-06 RX ADMIN — INSULIN ASPART 1 UNITS: 100 INJECTION, SOLUTION INTRAVENOUS; SUBCUTANEOUS at 09:36

## 2022-08-06 RX ADMIN — CAPTOPRIL 12.5 MG: 12.5 TABLET ORAL at 19:53

## 2022-08-06 RX ADMIN — Medication 1 PACKET: at 09:36

## 2022-08-06 RX ADMIN — QUETIAPINE FUMARATE 50 MG: 50 TABLET ORAL at 19:54

## 2022-08-06 RX ADMIN — Medication 400 MG: at 12:57

## 2022-08-06 RX ADMIN — QUETIAPINE FUMARATE 25 MG: 25 TABLET ORAL at 15:52

## 2022-08-06 RX ADMIN — INSULIN ASPART 1 UNITS: 100 INJECTION, SOLUTION INTRAVENOUS; SUBCUTANEOUS at 19:54

## 2022-08-06 RX ADMIN — Medication 1 PACKET: at 13:06

## 2022-08-06 RX ADMIN — VANCOMYCIN HYDROCHLORIDE 750 MG: 10 INJECTION, POWDER, LYOPHILIZED, FOR SOLUTION INTRAVENOUS at 17:43

## 2022-08-06 RX ADMIN — OXYCODONE HYDROCHLORIDE 5 MG: 5 TABLET ORAL at 15:52

## 2022-08-06 RX ADMIN — MULTIVITAMIN 15 ML: LIQUID ORAL at 09:36

## 2022-08-06 RX ADMIN — LIDOCAINE PATCH 4% 1 PATCH: 40 PATCH TOPICAL at 08:55

## 2022-08-06 RX ADMIN — FLUTICASONE FUROATE AND VILANTEROL TRIFENATATE 1 PUFF: 100; 25 POWDER RESPIRATORY (INHALATION) at 09:34

## 2022-08-06 RX ADMIN — POTASSIUM CHLORIDE 20 MEQ: 750 TABLET, EXTENDED RELEASE ORAL at 08:47

## 2022-08-06 RX ADMIN — NYSTATIN 1000000 UNITS: 100000 SUSPENSION ORAL at 08:58

## 2022-08-06 RX ADMIN — OXYCODONE HYDROCHLORIDE 5 MG: 5 TABLET ORAL at 04:15

## 2022-08-06 RX ADMIN — ACETAMINOPHEN 975 MG: 325 TABLET, FILM COATED ORAL at 23:40

## 2022-08-06 ASSESSMENT — ACTIVITIES OF DAILY LIVING (ADL)
ADLS_ACUITY_SCORE: 35
ADLS_ACUITY_SCORE: 37
ADLS_ACUITY_SCORE: 36
ADLS_ACUITY_SCORE: 36
ADLS_ACUITY_SCORE: 39
ADLS_ACUITY_SCORE: 35
ADLS_ACUITY_SCORE: 35
ADLS_ACUITY_SCORE: 37
ADLS_ACUITY_SCORE: 35

## 2022-08-06 NOTE — PLAN OF CARE
Neuro: A&Ox4. Forgetful. Intermittently anxious and agitated- PRN Seroquel given x1. Neuros q4 intact.  Cardiac: SR-ST. HR 90-100s. HM 3 LVAD, numbers WNL. MAPs and pulses dopplered. 1st degree AV block noted at beginning of night   Respiratory: Sating >95% on RA. Denies SOB.  GI/: Adequate urine output via bedside urinal. Loose BM x1.  Diet/appetite: Tolerating regular diet. Cyclic TF running from 8084-0237, 75 ml/hour.  Activity:  Assist of 1-2 with gait belt and walker, up to bedside.  Pain: Complaining of chronic shoulder and back pain- PRN oxycodone given x2.   Skin: Blanchable redness to sacrum- Mepi in use. Bloody nose in night in R nostril where NJ tube is placed. Temporarily packed with gauze until bleeding subsided.   LDA's: NJ running TF. L PIV. HM3 LVAD.    Plan: Continue with POC. Notify primary team with changes.

## 2022-08-06 NOTE — PROGRESS NOTES
Cardiovascular Surgery Progress Note         Assessment and Plan:     Dandy Sands is a 61 year old male with a PMH of CAD s/p PCI to LAD, MI, ICM, acute on chronic heart failure, s/p CRT-D, severe MR, antiphospholipid antibody syndrome on chronic warfarin, SLE, HTN, HLD, recent hospitalization 5/16-5/26 s/p RCA, LAD stenting who underwent RVAD (29F Protek duo RIJ) LVAD placement (HeartMate 3) on 7/8/22 by Dr. Zamora. Intraoperative course complicated by IABP dysfunction and RV failure, requiring crash onto bypass / vasopressor support, NO, and protek placement. Now s/p chest closure and NO wean 7/11. Return to OR for hemopericardium (7/12) and chest re-closure 7/13. Protek RVAD removed 7/21.     PLAN:   Neuro/ pain/ sedation:  Acute Postoperative pain  Depression   Anxiety due to a medical condition  Insomnia  Delirium   - Recent fall on 7/29 and increased delirium over weekend; CT scan of head on 8/1 did not show any acute intracranial pathologies  - Pain:         -Scheduled tylenol. Scheduled oxycodone 5mg q 8 hr discontinued on 8/2        -PRN oxycodone 5mg q4h, dilaudid 0.2 mg PRN  - Depression:         - Zoloft 50 mg started on 8/2        - Duloxetine 30 mg discontinued on 8/2  - Sleep:         - Melatonin 10 mg HS, discontinued benadryl d/t potential to propagate/worsen delirium.  Failed trazodone and zyprexa trials.        - Seroquel 25 mg in AM and 4 pm and Seroquel  mg at 8 pm. Added as of psych consult on 8/3.         - Added zolpidem 5 mg HS on 8/4  - Anxiety: Seroquel, as above        - Hydroxyzine PRN discontinued on 8/2 due to anticholinergic effects         - Ativan 0.5-1.0mg IV q6h PRN added for anxiety, discontinued on 8/2 for worsening confusion  - PTA meds: hydroxyzine 25mg PRN, zolpidem 5mg, melatonin 10mg, lorazepam 0.5mg  - QTc(7/18)- 683 msec, no haldol; recheck QTc in AM given numerous QT-prolonging agents     Pulmonary:   Acute hypoxic respiratory failure on iMV,  resolved  Mild-moderate emphysema  History of COVID-19  Iatrogenic R apical PTX  - Saturating well on room air  - Maintain SpO2> 92%  - Pulmonary toilet, Incentive spirometry, T/C/DB, OOB/actiivty  - Right chest tube placed by IR on 8/1 for moderate pleural effusion; chest x-ray post-procedure showed a small right pneumothorax, which improved as of 8/3 and chest tube subsequntly removed     Cardiovascular:    S/p LVAD placement (HeartMate 3) on 7/8/22   S/p RVAD (29F Protek duo RIJ) on 7/8/22  S/p chest closure 7/11  Cardiogenic shock  Advanced ischemic cardiomyopathy, class IV, stage D  PMH CAD s/p PCI to LAD (2005)  S/p CRT-D (2006)  History of MI (2007)  Severe MR  Antiphospholipid antibody syndrome on chronic warfarin  HTN, HLD  Recent hospitalization 5/16-5/26 s/p RCA, LAD stenting  Atrial fibrillation   RV mobile clot   - Monitor hemodynamic status. Chest closure and oxygenator splicing 7/11. Reopened 7/12 for I&D and left open, closed 7/13. RVAD removed 7/21.  - LVAD, HF meds, and diuretic management per [facility] service  - Aspirin 81 mg  - Atorvastatin 40 mg    - Anticoagulation with Warfarin, dosing per pharmacy. INR goal 2-3.   - F/u echo 8/2 to monitor status of RV clot:  no thrombus noted     GI / Nutrition:   GERD  Congestive hepatopathy in the setting of cardiac insuffiencey - Resolved  Hematemesis / oral mucosal bleeding -resolved    Dysphagia  - Regular diet with thickened liquids, SLP following  - Cycled TF via NJT, Nutrition following, calorie counts  - Bowel regimen prn (miralax / senna)  - Trend LFTs   - GI consulted 7/31 for black tarry stools and a possible GI bleed, however it is more likely swallowed blood from epistaxis that patient previously had. GI does not recommend an upper endoscopy at this time.   - 8/5 decrease IV Protonix to once daily per GI     Renal/ Fluid Balance:    Hyponatremia  - Strict I/O, daily weights   - Avoid/limit nephrotoxins as able  - Replete lytes PRN per  protocol  - PTA meds: tamsulosin 0.4mg (held)  - Diuresis per Cards 2     Endocrine:    Stress-induced hyperglycemia  Preop A1c 5.5%  - Insulin glargine 20U and high sliding scale  - Goal BG <180 for optimal healing.  - Held glargine since 7/29 in anticipation of possibly cycling tube feeding, restart NPH vs Lantus if sugars remain elevated.      ID/ Antibiotics:  Perioperative antibiosis (s/p course of Cefepime, vancomycin, rifampin, fluconazole)  HAP - Enterococcus faecalis PNA, s/p treatment   - 7/12 Pan culture: Bcx x2, UA/UC -> negative  - 7/14 Endotracheal sputum culture (4+ candida albicans, 1+ E.Coli, 3+ enterococcus faecalis)              - Zosyn for HAP(7/15-7/22)  - 7/18 C.diff- negative.  - 7/25 Blood cultures drawn for unexplained hypotension, NGTD  - 8/3 PICC line culture on 8/2 grew gram positive cocci in pairs and chairs (E. Faecalis and S. Epidermidis); ID consulted, PICC removed and cultured, vancomycin 750 mg IV every 12 hours for a short five day course (8/3 - 8/7),  - Serial BC with NGTD    - Nystatin for thrush 8/3     Heme:     Acute blood loss anemia  Acute blood loss thrombocytopenia  History of DVT and PE   Antiphospholipid antibody syndrome  History of iron deficiency anemia  Epistaxis  Chronic anticoagulation  - Monitor for s/sx of bleeding  - Trend CBC and coags  - Hgb goal >8.  Hgb stable in 8-9  - Plt goal > 20  - History of nose bleed on 7/30, transfused for hgb 6.8, 2U received. Recheck ordered and has been stable, monitor daily   - Pharmacy to dose coumadin, therapeutic      Rheumatology:  SLE  - PTA meds: hydroxychloroquine 200mg, resumed 7/16  - Rheumatology consulted and following.   - Will need cellcept restarted when appropriate     ENT:  Dysphonia  Hoarse voice  - ENT consult on 8/5 for possible vocal cord injury and consideration of VC injection. Had laryngoscopy on 8/5, no VC injury or dysfunction, recommended continued therapy with Speech team.      Prophylaxis:    - DVT:  "SCD, warfarin, OOB/activity  - PPI     Disposition:  - Transfer to  on 7/28  - Dispo:  TCU vs ARU once medically stable - potentially mid-week    Debbie Montoya CNP, [facility]  Cardiothoracic Surgery  Pager [phone]        Interval History:     States he slept better and is feeling pretty good today.  Would like to have his SBFT out as he feels it gets in his way and he blames this for his lower PO intake.  Frustrated about voice qualtiy and eating restrictions but feels he is making progress slowly and is optimistic overall.  Slow nose bleed developing over the course of the day.         Physical Exam:   Blood pressure 105/57, pulse 108, temperature 97.9  F (36.6  C), temperature source Oral, resp. rate 14, height 1.702 m (5' 7\"), weight 64.6 kg (142 lb 6.7 oz), SpO2 93 %.  Vitals:    08/05/22 1300 08/06/22 0400 08/07/22 0315   Weight: 63.5 kg (139 lb 15.9 oz) 65 kg (143 lb 4.8 oz) 64.6 kg (142 lb 6.7 oz)        Gen: NAD  Neuro: A&Ox4, no focal deficits   CV: LVAD hum  Pulm: CTA, no wheezing or rhonchi, unlabored breathing on RA  Abd: SNDNT  Ext: no peripheral edema  Incision: clean, dry, intact, no erythema, sternum stable  Tubes/drain sites: scabbed over, no erythema or drainage  Lines: driveline dressing clean and dry   LVAD: speed 5300, flow 3-4, PI 5-6, power 3.8-4.          Data:    Imaging:   No results found for this or any previous visit (from the past 24 hour(s)).      Labs:  BMP  Recent Labs   Lab 08/07/22  0307 08/07/22  0231 08/06/22  2339 08/06/22  1949 08/06/22  0757 08/06/22  0444 08/05/22  0759 08/05/22  0440 08/04/22  1131 08/04/22  0745   NA  --  133*  --   --   --  131*  --  134*  --  128*   POTASSIUM  --  4.6  --   --   --  3.8  --  3.8  --  3.9   CHLORIDE  --  95*  --   --   --  93*  --  95*  --  92*   ELDA  --  8.6*  --   --   --  8.5*  --  8.6*  --  8.7*   CO2  --  28  --   --   --  29  --  28  --  26   BUN  --  26.3*  --   --   --  28.1*  --  26.6*  --  24.7*   CR  --  0.67  --   --   " --  0.72  --  0.73  --  0.75   * 115* 146* 144*   < > 129*   < > 129*   < > 164*    < > = values in this interval not displayed.     CBC  Recent Labs   Lab 08/07/22 0231 08/06/22 1939 08/06/22  1210 08/06/22  0444 08/05/22  1036 08/05/22  0440 08/04/22  1202 08/04/22  0745   WBC 12.1*  --   --  8.4  --  10.1  --  10.5   RBC 2.95*  --   --  3.02*  --  3.14*  --  3.09*   HGB 8.5* 8.6* 8.3* 8.7*  8.7*   < > 9.1*  9.1*   < > 9.1*   HCT 27.4*  --   --  28.2*  --  28.8*  --  28.9*   MCV 93  --   --  93  --  92  --  94   MCH 28.8  --   --  28.8  --  29.0  --  29.4   MCHC 31.0*  --   --  30.9*  --  31.6  --  31.5   RDW 16.6*  --   --  16.9*  --  16.9*  --  17.3*   *  --   --  390  --  385  --  370    < > = values in this interval not displayed.     INR  Recent Labs   Lab 08/07/22 0231 08/06/22 0444 08/05/22  0440 08/04/22  0745   INR 3.04* 2.81* 2.70* 2.66*      Hepatic Panel  Recent Labs   Lab 08/07/22 0231 08/06/22 0444 08/05/22  0440 08/04/22  0745   AST 31 34 34 32   ALT 20 19 16 14   ALKPHOS 214* 203* 211* 216*   BILITOTAL 0.4 0.4 0.4 0.5   ALBUMIN 3.1* 3.0* 3.0* 2.9*     GLUCOSE:   Recent Labs   Lab 08/07/22  0307 08/07/22  0231 08/06/22  2339 08/06/22 1949 08/06/22  1528 08/06/22  1116   * 115* 146* 144* 125* 120*

## 2022-08-06 NOTE — PROGRESS NOTES
Cardiology Progress Note    Assessment & Plan   Dandy Sands is a 61 year old male with PMH of lupus, antiphospholipid syndrome, HTN, HLD, HFrEF 2/2 ICM who presented with cardiogenic shock c/b multiorgan failure (JOSE, shock liver) requiring mechanical support with IABP, now s/p HM3 LVAD 7/8/22 by Dr. Zamora with intraoperative course c/b RV failure s/p 29F Protek duo via RIJ. Post op course c/b hemopericardium s/p repeat washout with chest left open. Chest closed 7/13/22 and decannulated from RVAD 7/21    Changes Today:  -Change bumex to 3mg PO BID (ordered for you)  -Continue Captopril 12.5mg TID and Dig 125mcg daily  -Anticoagulation and antibiotics per primary  -LVAD with acceptable function     #HFrEF 2/2 ICM s/p HM3 LVAD in setting of cardiogenic shock (SCAI D)  #RV failure s/p Protek duo temporary RVAD s/p decannulation 7/21  #RV thrombus  #Post chest closure hemopericardium s/p repeat washout (7/12)  Patient with ICM s/p HM3 LVAD 7/8/22 by Dr. Zamora in setting of presentation for cardiogenic shock requiring MCS with IABP as prior to HM (initially evaluated for heart transplant, however noted to have substantially elevated DSAs during course of care, so decision made to proceed with LVAD given patient already on temporary MCS). Intraoperative course c/b RV failure resulting in cardiogenic shock requiring high dose pressors necessitating RVAD support. Initial post-op course c/b hemopericardium requiring repeat washout with chest left open, however has since recovered well with decannulation on 7/21 with extubation day prior. Has continued to tolerate well from hemodynamic and end organ standpoint.   -LVAD: continues to have good flows, no alarms and stable end organ perfusion; continue speed at 5300  -continue digoxin for RV support  -AC, antiplatelets: continue ASA; On warfarin  -Volume status: Continue bumex 3mg PO BID  -rhythm: sinus              -of note, patient with climbing capture threshold of RV  "lead; will need to be evaluated in future by EP team  -blood pressure: Continue captopril to 12.5mg TID, may increase if MAPs remain elevated  -encourage ICS, pulmonary toilet  -delirium precautions     Plan to be discussed with Dr Stewart.    Chris Lawrence MD  Cardiology Fellow      Interval History   Reviewed events from last 24 hours.  No acute events overnight.  No new symptoms.    Physical Exam   Temp: 97.1  F (36.2  C) Temp src: Axillary BP: (!) 127/98 Pulse: 97   Resp: 20 SpO2: 97 % O2 Device: None (Room air)    Vitals:    08/05/22 0110 08/05/22 1300 08/06/22 0400   Weight: 65.7 kg (144 lb 13.5 oz) 63.5 kg (139 lb 15.9 oz) 65 kg (143 lb 4.8 oz)     Vital Signs with Ranges  Temp:  [97.1  F (36.2  C)-97.6  F (36.4  C)] 97.1  F (36.2  C)  Pulse:  [] 97  Resp:  [16-20] 20  BP: ()/(52-98) 127/98  SpO2:  [91 %-97 %] 97 %  I/O last 3 completed shifts:  In: 2155 [P.O.:300; I.V.:250; NG/GT:330]  Out: 2350 [Urine:1700; Stool:650]     , Blood pressure (!) 127/98, pulse 97, temperature 97.1  F (36.2  C), temperature source Axillary, resp. rate 20, height 1.702 m (5' 7\"), weight 65 kg (143 lb 4.8 oz), SpO2 97 %.  143 lbs 4.78 oz  General Appearance:  Older male in NAD   Respiratory: coarse lung sounds bilaterally  Cardiovascular: vad hum, driveline site c/d/i, chest incisions c/d/i  GI: soft, nt, bs active  Skin: warm, well perfused, trace diffuse edema  Neuro: awake, alert, interactive, speech fluent, moving all 4 extremities    Medications     dextrose         acetaminophen  975 mg Oral or Feeding Tube Q8H CAMMY     aspirin  81 mg Oral or Feeding Tube Daily     atorvastatin  40 mg Oral QPM     bumetanide  3 mg Oral BID     captopril  12.5 mg Oral TID     digoxin  125 mcg Oral Daily     fiber modular (NUTRISOURCE FIBER)  1 packet Per Feeding Tube TID     fluticasone-vilanterol  1 puff Inhalation Daily     hydroxychloroquine  200 mg Oral BID     insulin aspart  1-12 Units Subcutaneous Q4H     lidocaine  1 patch " Transdermal Q24h    And     lidocaine   Transdermal Q8H CAMMY     lidocaine 3%, phenylephrine 0.25% solution for irrigation  5 mL Irrigation Once     melatonin  10 mg Oral At Bedtime     multivitamins w/minerals  15 mL Per Feeding Tube Daily     nystatin  1,000,000 Units Swish & Swallow 4x Daily     OLANZapine  5 mg Oral BID     oxidized cellulose   Topical Once     pantoprazole (PROTONIX) IV  40 mg Intravenous QAM AC     potassium chloride  40 mEq Oral BID     protein modular  1 packet Per Feeding Tube TID     QUEtiapine  100 mg Oral At Bedtime     QUEtiapine  25 mg Oral BID     sertraline  50 mg Oral Daily     vancomycin  750 mg Intravenous Q12H     warfarin ANTICOAGULANT  9 mg Oral ONCE at 18:00     Warfarin Therapy Reminder  1 each Oral See Admin Instructions     zolpidem  5 mg Oral At Bedtime       Data   Recent Labs   Lab 08/06/22  1210 08/06/22  1116 08/06/22  0757 08/06/22  0444 08/05/22  2351 08/05/22  2024 08/05/22  0759 08/05/22  0440 08/04/22  1131 08/04/22  0745   WBC  --   --   --  8.4  --   --   --  10.1  --  10.5   HGB 8.3*  --   --  8.7*  8.7*  --  8.8*   < > 9.1*  9.1*   < > 9.1*   MCV  --   --   --  93  --   --   --  92  --  94   PLT  --   --   --  390  --   --   --  385  --  370   INR  --   --   --  2.81*  --   --   --  2.70*  --  2.66*   NA  --   --   --  131*  --   --   --  134*  --  128*   POTASSIUM  --   --   --  3.8  --   --   --  3.8  --  3.9   CHLORIDE  --   --   --  93*  --   --   --  95*  --  92*   CO2  --   --   --  29  --   --   --  28  --  26   BUN  --   --   --  28.1*  --   --   --  26.6*  --  24.7*   CR  --   --   --  0.72  --   --   --  0.73  --  0.75   ANIONGAP  --   --   --  9  --   --   --  11  --  10   ELDA  --   --   --  8.5*  --   --   --  8.6*  --  8.7*   GLC  --  120* 153* 129*   < >  --    < > 129*   < > 164*   ALBUMIN  --   --   --  3.0*  --   --   --  3.0*  --  2.9*   PROTTOTAL  --   --   --  6.8  --   --   --  6.9  --  6.9   BILITOTAL  --   --   --  0.4  --   --   --   0.4  --  0.5   ALKPHOS  --   --   --  203*  --   --   --  211*  --  216*   ALT  --   --   --  19  --   --   --  16  --  14   AST  --   --   --  34  --   --   --  34  --  32    < > = values in this interval not displayed.       No results found for this or any previous visit (from the past 24 hour(s)).

## 2022-08-06 NOTE — CONSULTS
Oncology  Consult Note   Date of Service: 07/05/2022  Patient: Dandy Sands  MRN: [id number]  Admission Date: 6/17/2022  Hospital Day # 18  Cancer Diagnosis: None known   Primary Outpatient Oncologist: N/A  Current Treatment Plan:  N/A    Reason for Consult: Assess for malignancy in context of elevated PRA    History of Present Illness:    Dandy Sands is a 61 year old male with a history of CAD, ischemic cardiomyopathy (EF 15%), antiphospholipid syndrome (on warfarin), who was initially transferred (6/17) for cardiogenic shock. He was admitted to the cardiac ICU , where he had a subclavian IABP placed. He course was complicated by bloody emesis and epistaxis requiring cauterization. He is being evaluated for cardiac transplant, however he had a recent panel reactive antibody of 97%, concerning for high liklihood of transplant rejection. This PRA was significantly higher than earlier on admission. Oncology was consulted to help assess if he had an underlying malignancy contributing to high PRA. In terms of malignancy screening, he had a CT C/A/P (7/3/22), which did not show evidence of malignancy. He also had a normal colonoscopy 5/2022, and PSA WNL 5/2022.      Oncologic History:  No prior oncologic history     Review of Systems:  A comprehensive ROS was performed and found to be negative or non-contributory with the exception of that noted in the HPI above.    Past Medical History:  No past medical history on file.    Past Surgical History:  Past Surgical History:   Procedure Laterality Date     COLONOSCOPY N/A 05/23/2022    Procedure: COLONOSCOPY;  Surgeon: Parth Meneses MD;  Location: [facility] OR     CV CORONARY ANGIOGRAM N/A 5/24/2022    Procedure: Coronary Angiogram;  Surgeon: Mike Pope MD;  Location:  [facility] CARD[facility] CATH LAB     CV CORONARY ANGIOGRAM N/A 5/29/2022    Procedure: Coronary Angiogram;  Surgeon: Austin Bright MD;  [facility]     CV CORONARY  ED Cardiac





- General


Chief Complaint: Chest Pain > 30


Stated Complaint: CHEST PAIN,DIZZINESS


Time Seen by Provider: 09/24/19 22:09


Primary Care Provider: 


ANAYELI ÁLVAREZ MD [Primary Care Provider] - Follow up as needed


Mode of Arrival: Ambulatory


Information source: Patient


TRAVEL OUTSIDE OF THE [place] IN LAST 30 DAYS: No





- HPI


Notes: 





Patient comes in complaining of chest pain.  He states it radiates across his 

chest.  Is mild.  It is been constant for 6 days.  Nothing is made it better or 

worse.  He states he did recently start a new exercise program and the pain 

started after this.  No shortness of breath.  No nausea.  No sweating.  He 

states he had a previous cardiac stress test "years ago" that was normal.  No 

recent evaluations.





- Related Data


Allergies/Adverse Reactions: 


                                        





No Known Allergies Allergy (Verified 03/29/18 12:12)


   











Past Medical History





- General


Information source: Patient





- Social History


Smoking Status: Never Smoker


Chew tobacco use (# tins/day): No


Frequency of alcohol use: Rare


Drug Abuse: Marijuana


Family History: Reviewed & Not Pertinent


Patient has suicidal ideation: No


Patient has homicidal ideation: No





- Past Medical History


Cardiac Medical History: Reports: Hx Hypertension


Renal/ Medical History: Denies: Hx Peritoneal Dialysis


Psychiatric Medical History: 


   Denies: Hx Depression





Review of Systems





- Review of Systems


Constitutional: denies: Chills, Fever


Cardiovascular: Chest pain.  denies: Palpitations


Respiratory: denies: Cough, Short of breath


Gastrointestinal: denies: Diarrhea, Vomiting


-: Yes All other systems reviewed and negative





Physical Exam





- Vital signs


Vitals: 





                                        











Temp Pulse Resp BP Pulse Ox


 


 99.1 F   73   16   135/84 H  98 


 


 09/24/19 22:01  09/24/19 22:01  09/24/19 22:01  09/24/19 22:01  09/24/19 22:01











Interpretation: Normal





- General


General appearance: Appears well, Alert





- HEENT


Head: Normocephalic, Atraumatic


Eyes: Normal


Pupils: PERRL





- Respiratory


Respiratory status: No respiratory distress


Chest status: Nontender


Breath sounds: Normal


Chest palpation: Normal





- Cardiovascular


Rhythm: Regular


Heart sounds: Normal auscultation


Murmur: No





- Abdominal


Inspection: Normal


Distension: No distension


Bowel sounds: Normal


Tenderness: Nontender


Organomegaly: No organomegaly





- Back


Back: Normal, Nontender





- Extremities


General upper extremity: Normal inspection, Nontender, Normal color, Normal ROM,

Normal temperature


General lower extremity: Normal inspection, Nontender, Normal color, Normal ROM,

Normal temperature, Normal weight bearing.  No: Richelle's sign





- Neurological


Neuro grossly intact: Yes


Cognition: Normal


Orientation: AAOx4


Guy Coma Scale Eye Opening: Spontaneous


Guy Coma Scale Verbal: Oriented


Tolu Coma Scale Motor: Obeys Commands


Guy Coma Scale Total: 15


Speech: Normal


Motor strength normal: LUE, RUE, LLE, RLE


Sensory: Normal





- Psychological


Associated symptoms: Normal affect, Normal mood





- Skin


Skin Temperature: Warm


Skin Moisture: Dry


Skin Color: Normal





Course





- Re-evaluation


Re-evalutation: 





09/25/19 00:39


Patient presents with 6 days of constant chest pain.  Based on negative enzymes 

unremarkable EKG and a 6-day history of constant chest pain after starting an 

exercise program I do not believe the patient warrants further emergency 

department or inpatient evaluation.  I have recommended to him that he pursue 

outpatient cardiac evaluation.





- Vital Signs


Vital signs: 





                                        











Temp Pulse Resp BP Pulse Ox


 


 99.1 F   73   16   135/84 H  98 


 


 09/24/19 22:01  09/24/19 22:01  09/24/19 22:01  09/24/19 22:01  09/24/19 22:01














- Laboratory


Result Diagrams: 


                                 09/24/19 22:22





                                 09/24/19 22:22


Laboratory results interpreted by me: 





                                        











  09/24/19





  22:22


 


Hgb  13.2 L


 


MCV  78 L


 


MCH  25.6 L


 


RDW  14.9 H














- Diagnostic Test


Radiology reviewed: Image reviewed, Reports reviewed





- EKG Interpretation by Me


EKG shows normal: Sinus rhythm


Rate: Normal - 67


Rhythm: NSR


Axis/QRS: No: Right axis deviation, Left axis deviation





Discharge





- Discharge


Clinical Impression: 


 Atypical chest pain





Condition: Stable


Disposition: HOME, SELF-CARE


Instructions:  Chest Pain of Unclear Cause ([facility])


Additional Instructions: 


Please contact your primary care physician as soon as possible.  Please discuss 

outpatient cardiac stress test


Referrals: 


ANAYELI ÁLVAREZ MD [Primary Care Provider] - Follow up in 3-5 days EMERGENCY DEPARTMENT HISTORY AND PHYSICAL EXAM      Date: 8/4/2022  Patient Name: Jake Hannah    History of Presenting Illness     Chief Complaint   Patient presents with    Shortness of Breath       History Provided By: Patient    HPI: Jake Hannah, 63 y.o. male 11yo M w recent multiple medical problems including HTN, CHF, renal dysfunction presenting with a depressed affect and complaining of intermittent chest tightness, difficulty breathing on exertion that has become less and less. States cannot ambulate more than 10-20 ft without becoming sob. There are no other complaints, changes, or physical findings at this time. PCP: Qamar Jorge MD    No current facility-administered medications on file prior to encounter. Current Outpatient Medications on File Prior to Encounter   Medication Sig Dispense Refill    minoxidiL (LONITEN) 10 mg tablet Take 5 mg by mouth two (2) times a day. hydrALAZINE (APRESOLINE) 100 mg tablet Take 1 Tablet by mouth three (3) times daily. 90 Tablet 0    carvediloL (COREG) 25 mg tablet Take 1 Tablet by mouth two (2) times daily (with meals). (Patient not taking: Reported on 7/26/2022) 60 Tablet 0    amLODIPine (NORVASC) 10 mg tablet Take 10 mg by mouth daily. atorvastatin (LIPITOR) 40 mg tablet Take 40 mg by mouth daily. ferrous sulfate 325 mg (65 mg iron) tablet Take 325 mg by mouth two (2) times daily (with meals). (Patient not taking: Reported on 7/26/2022)      isosorbide mononitrate (ISMO, MONOKET) 20 mg tablet Take 20 mg by mouth two (2) times a day. SITagliptin (JANUVIA) 50 mg tablet Take 100 mg by mouth in the morning.      sodium bicarbonate 650 mg tablet Take 1,300 mg by mouth three (3) times daily. glipiZIDE (GLUCOTROL) 5 mg tablet Take 5 mg by mouth two (2) times a day.          Past History     Past Medical History:  Past Medical History:   Diagnosis Date    Chronic kidney disease     Chronic radiculopathy due to radiation Diabetes ([address]     Hypertension     Iron deficiency anemia     Lumbar spondylosis        Past Surgical History:  No past surgical history on file. Family History:  Family History   Problem Relation Age of Onset    Diabetes Mother     Hypertension Father     Dementia Father     No Known Problems Sister     Diabetes Sister     No Known Problems Sister     No Known Problems Sister     No Known Problems Sister     No Known Problems Brother     No Known Problems Brother     No Known Problems Brother        Social History:  Social History     Tobacco Use    Smoking status: Never    Smokeless tobacco: Never   Vaping Use    Vaping Use: Never used   Substance Use Topics    Alcohol use: Not Currently    Drug use: Not Currently       Allergies: Allergies   Allergen Reactions    Contrast Dye [Iodine] Nausea and Vomiting       Review of Systems   Review of Systems   Constitutional:  Positive for activity change and fatigue. Negative for fever. HENT:  Negative for congestion, ear discharge, postnasal drip, rhinorrhea, sinus pressure and sore throat. Eyes:  Negative for visual disturbance. Respiratory:  Positive for chest tightness and shortness of breath. Negative for wheezing. Cardiovascular:  Negative for chest pain, palpitations and leg swelling. Gastrointestinal:  Negative for abdominal pain, nausea and vomiting. Genitourinary: Negative. Musculoskeletal:  Negative for arthralgias, back pain and myalgias. Skin:  Negative for color change and rash. Neurological:  Negative for dizziness, weakness and headaches. Psychiatric/Behavioral:  Negative for behavioral problems and confusion. Physical Exam   Physical Exam  Vitals and nursing note reviewed. Constitutional:       General: He is not in acute distress. Appearance: Normal appearance. He is not ill-appearing. HENT:      Head: Normocephalic and atraumatic.       Mouth/Throat:      Mouth: Mucous membranes are moist.   Eyes:      Extraocular LITHOTRIPSY PCI Bilateral 5/24/2022    Procedure: Percutaneous Coronary Intervention - Lithotripsy;  Surgeon: Mike Pope MD;  Location: [facility]     CV INTRA AORTIC BALLOON N/A 6/17/2022    Procedure: Intraprocedure Aortic Balloon Pump Insertion;  Surgeon: Sherman Cerda MD;  [facility]     CV INTRA AORTIC BALLOON Right 7/3/2022    Procedure: Reposition of Subclavian Intraprocedure Aortic Balloon Pump;  Surgeon: Austin Bright MD;  [facility]     CV INTRAVASULAR ULTRASOUND N/A 5/24/2022    Procedure: Intravascular Ultrasound;  Surgeon: Mike Pope MD;  Location: [facility]     CV PCI ANGIOPLASTY N/A 5/29/2022    Procedure: Percutaneous Transluminal Angioplasty;  Surgeon: Austin Bright MD;  Location: [facility]     CV PCI STENT DRUG ELUTING N/A 5/24/2022    Procedure: Percutaneous Coronary Intervention Stent;  Surgeon: Mike Pope MD;  Location: [facility]     CV RIGHT HEART CATH MEASUREMENTS RECORDED N/A 05/18/2022    Procedure: Right Heart Catheterization;  Surgeon: Justo Stewart MD;  Location: [facility]     CV RIGHT HEART CATH MEASUREMENTS RECORDED N/A 5/24/2022    Procedure: Right Heart Catheterization;  Surgeon: Mike Pope MD;  Location: [facility]     CV RIGHT HEART CATH MEASUREMENTS RECORDED N/A 5/29/2022    Procedure: Right Heart Catheterization;  Surgeon: Austin Bright MD;  Location: [facility]     CV RIGHT HEART CATH MEASUREMENTS RECORDED N/A 7/1/2022    Procedure: Right Heart Catheterization;  Surgeon: Mike Pope MD;  Location: [facility]     CV RIGHT HEART EXERCISE STRESS STUDY N/A 05/18/2022    Procedure: Stress Drug Study;  Surgeon: Justo Stewart MD;  Location: [facility]     CV SWAN CHOCO PROCEDURE N/A 6/17/2022    Procedure: [place] Procedure;  Surgeon:  Movements: Extraocular movements intact. Conjunctiva/sclera: Conjunctivae normal.      Pupils: Pupils are equal, round, and reactive to light. Cardiovascular:      Rate and Rhythm: Normal rate and regular rhythm. Pulmonary:      Effort: No respiratory distress. Breath sounds: No wheezing. Comments: Mild tachypnea. Slight rales, trace ble edema. Chest:      Chest wall: No tenderness. Abdominal:      General: Bowel sounds are normal.      Palpations: Abdomen is soft. Musculoskeletal:         General: Normal range of motion. Cervical back: Normal range of motion and neck supple. Skin:     General: Skin is warm and dry. Capillary Refill: Capillary refill takes less than 2 seconds. Neurological:      General: No focal deficit present. Mental Status: He is alert and oriented to person, place, and time. Mental status is at baseline. Psychiatric:         Mood and Affect: Mood normal.      Comments: Depressed affect. No si or hi. CXR Results  (Last 48 hours)                 08/04/22 1641  XR CHEST PORT Final result    Impression:  Stable cardiomegaly. Narrative:  Clinical indication: Chest pain. Frontal view of the chest obtained, the heart is enlarged. No focal infiltrate. Medical Decision Making and ED Course   - I am the first provider for this patient. I reviewed the vital signs, available nursing notes, past medical history, past surgical history, family history and social history. - Initial assessment performed. The patients presenting problems have been discussed, and they are in agreement with the care plan formulated and outlined with them. I have encouraged them to ask questions as they arise throughout their visit. Vital Signs-Reviewed the patient's vital signs. No data found.     Differential Diagnosis & Medical Decision Making Provider Note:      MDM  Number of Diagnoses or Management Options  Chest pain with high risk Sherman Cerda MD;  Location:  HE[facility] CARDIAC CATH LAB     INSERT INTRAAORTIC BALLOON PUMP Right 6/23/2022    Procedure: INSERTION, INTRA-AORTIC BALLOON PUMP RIGHT SUBCLAVIAN ARTERY.;  Surgeon: Hayden Veras MD;  Location: [facility] OR     PICC DOUBLE LUMEN PLACEMENT Right 05/28/2022    right basilic 5 fr dl picc 40 cm       Social History:  Social History     Socioeconomic History     Marital status: Single        Family History  Father and sister with lung cancer,    Outpatient Medications:  No current facility-administered medications on file prior to encounter.  atorvastatin (LIPITOR) 40 MG tablet, Take 40 mg by mouth daily  clopidogrel (PLAVIX) 75 MG tablet, Take 1 tablet (75 mg) by mouth daily  DULoxetine (CYMBALTA) 30 MG capsule, Take 1 capsule (30 mg) by mouth daily  famotidine (PEPCID) 20 MG tablet, Take 20 mg by mouth 2 times daily  ferrous sulfate (FE TABS) 325 (65 Fe) MG EC tablet, Take 325 mg by mouth daily (with lunch)  furosemide (LASIX) 40 MG tablet, Take 1 tablet (40 mg) by mouth daily  hydroxychloroquine (PLAQUENIL) 200 MG tablet, Take 200 mg by mouth 2 times daily  hydrOXYzine (ATARAX) 25 MG tablet, Take 1 tablet (25 mg) by mouth every 6 hours as needed for anxiety (ANXIETY)  LORazepam (ATIVAN) 0.5 MG tablet, Take 0.5-1 mg by mouth every 4 hours as needed for anxiety  melatonin 3 MG tablet, Take 6 mg by mouth nightly as needed for sleep  mycophenolate (GENERIC EQUIVALENT) 500 MG tablet, Take 1,500 mg by mouth 2 times daily  potassium chloride ER (KLOR-CON M) 20 MEQ CR tablet, Take 1 tablet (20 mEq) by mouth 2 times daily  promethazine (PHENERGAN) 12.5 MG tablet, Take 1 tablet (12.5 mg) by mouth every 6 hours as needed for nausea or vomiting  tamsulosin (FLOMAX) 0.4 MG capsule, Take 0.4 mg by mouth daily  thiamine (B-1) 100 MG tablet, Take 1 tablet (100 mg) by mouth daily  warfarin ANTICOAGULANT (COUMADIN) 5 MG tablet, Take 1 tablet (5 mg) by mouth daily Get an INR on Friday 5/27 and then  "follow instructions by your coumadin clinic  zolpidem (AMBIEN) 5 MG tablet, Take 5 mg by mouth nightly as needed for sleep  [DISCONTINUED] lisinopril (ZESTRIL) 2.5 MG tablet, Take 1 tablet (2.5 mg) by mouth 2 times daily  [DISCONTINUED] ticagrelor (BRILINTA) 90 MG tablet, Take 1 tablet (90 mg) by mouth 2 times daily         Physical Exam:    Blood pressure 110/78, pulse 85, temperature 97.9  F (36.6  C), temperature source Oral, resp. rate 20, height 1.702 m (5' 7\"), weight 65.3 kg (143 lb 15.4 oz), SpO2 96 %.  General: alert and cooperative, sitting in chair, no acute distress  HEENT: sclera anicteric, EOMI, MMM  Neck: supple, normal ROM  CV: RRR, no murmurs, IABP in place  Resp: CTAB, normal respiratory effort on ambient air  GI: soft, non-tender, non-distended, bowel sounds present and normoactive  MSK: warm and well-perfused, normal tone  Skin: no rashes on limited exam, no jaundice  Neuro: Alert and interactive, moves all extremities equally, no focal deficits    Labs & Studies: I personally reviewed the following studies:  ROUTINE LABS (Last four results):  CMP  Recent Labs   Lab 07/05/22  0404 07/04/22  1036 07/04/22  0629 07/04/22  0423 07/03/22  0307 07/02/22  2121 07/02/22  0437 06/30/22  2258 06/30/22  0401 06/29/22  0358 06/29/22  0353   * 134 125*  --  132*   < > 130*   < > 135*  --  130*   POTASSIUM 4.5 5.7* 4.6  --  4.1   < > 4.2   < > 4.0   < > 4.3   CHLORIDE 97* 106 94*  --  100   < > 99   < > 102  --  100   CO2 21* 24 20*  --  22   < > 17*   < > 22  --  21*   ANIONGAP 12 4 11  --  10   < > 14   < > 11  --  9   * 116* 86  --  96   < > 96   < > 98   < > 89   BUN 14.7 13 11.8  --  13.3   < > 14.0   < > 10.3  --  9.7   CR 0.71 0.61* 0.62*  --  0.67   < > 0.77   < > 0.66*  --  0.63*   GFRESTIMATED >90 >90 >90  --  >90   < > >90   < > >90  --  >90   ELDA 9.1 9.4 8.9  --  8.9   < > 9.1   < > 8.5*  --  9.2   MAG 2.0 3.0*  --  1.3* 2.0  --  2.0   < > 4.0*   < > 1.9   PHOS  --   --   --   --   -- " for cardiac etiology  Congestive heart failure, unspecified HF chronicity, unspecified heart failure type ([address]  Diagnosis management comments: 62yo M w recent multiple medical problems including HTN, CHF, renal dysfunction presenting with a depressed affect and complaining of intermittent chest tightness, difficulty breathing on exertion that has become less and less. States cannot ambulate more than 10-20 ft without becoming sob. Given his heart history , low EF and current volume status, recommended admission to [facility] but he is declining. States he has appointment in the AM and would prefer to follow up. On reassessment he is improved after lasix and does have follow up tomorrow. Will DC home with close outpatietn followup. ED Course:   ED Course as of 08/05/22 2108   Thu Aug 04, 2022   1924 PT states he does not wish to stay in the hospital. Says he has appointment with Nephrology tomorrow and wants discharge w follwup. Repeat troponin is negative, pt is feeling improved. Will dc as requested, increase diuretic for 2 days. [MC]      ED Course User Index  [MC] Marleni Bennett MD          Disposition   Disposition: Condition improved  DC- Adult Discharges: All of the diagnostic tests were reviewed and questions answered. Diagnosis, care plan and treatment options were discussed. The patient understands the instructions and will follow up as directed. The patients results have been reviewed with them. They have been counseled regarding their diagnosis. The patient verbally convey understanding and agreement of the signs, symptoms, diagnosis, treatment and prognosis and additionally agrees to follow up as recommended with their PCP in 24 - 48 hours. They also agree with the care-plan and convey that all of their questions have been answered.   I have also put together some discharge instructions for them that include: 1) educational information regarding their diagnosis, 2) how to care for   --   --   --  4.6*  --  5.4*   PROTTOTAL 6.5  --  6.2*  --  6.3*  --  6.6   < > 5.9*  --  6.1*   ALBUMIN 3.2*  --  3.2*  --  3.2*  --  3.2*   < > 2.9*  --  3.0*   BILITOTAL 0.3  --  0.3  --  0.3  --  0.3   < > 0.3  --  0.4   ALKPHOS 120  --  111  --  115  --  150*   < > 122  --  132*   AST 24  --  24  --  31  --  42   < > 26  --  30   ALT 41  --  42  --  55*  --  72*   < > 69*  --  87*    < > = values in this interval not displayed.     CBC  Recent Labs   Lab 07/05/22  0404 07/04/22  0629 07/04/22  0423 07/03/22  0307   WBC 5.9 5.8 4.3 5.8   RBC 3.11* 2.96* 3.60* 2.95*   HGB 8.8* 8.4* 10.4* 8.4*   HCT 28.4* 27.6* 33.2* 27.2*   MCV 91 93 92 92   MCH 28.3 28.4 28.9 28.5   MCHC 31.0* 30.4* 31.3* 30.9*   RDW 16.1* 16.3* 16.3* 16.7*    320 283 381     INR  Recent Labs   Lab 07/05/22  0404 07/04/22  0423 07/03/22  0307 07/02/22  0437   INR 1.28* 1.26* 1.16* 1.19*       Assessment & Plan:   Dandy Sands is a 61 year old male history of CAD, ischemic cardiomyopathy (EF 15%), antiphospholipid syndrome (on warfarin at home) admitted for cardiogenic shock. He is being evaluated for a cardiac transplant but had a new high panel reactive antibody (PRA) of 97%, concerning for high liklihood of transplant rejection. Oncology was consulted to help assess for risk for underlying malignancy. He appears negative on age-appropriate cancer screening, including normal colonoscopy 5/2022, PSA 5/2022, negative CT C/A/P 7/3/22 for malignancy. He was a smoker with at least 25 year pack history, but quit in 2009, and not thoracic malignancy noted on CT Chest. Discussed with immunology, no clear association between PRA and malignancy, but this was raised as a possibility because of unexplained increase in PRA during admission.     Recommendations:   - While reviewing literature, did not see consistent association PRA and pre-transplant risk for malignancy.   -Did see study that suggest high PRA >80% was associated with greater  their diagnosis at home, as well a 3) list of reasons why they would want to return to the ED prior to their follow-up appointment, should their condition change. DC-The patient was given verbal chest pain warning signs and and follow-up instructions  DC- Pain Control DC Home plan: Referral Cardiology  Discharged    DISCHARGE PLAN:  1. Cannot display discharge medications since this patient is not currently admitted. 2.   Follow-up Information       Follow up With Specialties Details Why [address] EMERGENCY DEPT Emergency Medicine Go to  As needed, or for any concerns or deteriorations. , if symptoms persist or worsen. [facility]    Indy Boyd MD Nephrology Go in 1 day For followup and recheck of todays symptoms [address] [facility]  [phone]            3. Return to ED if worse   4. Discharge Medication List as of 8/4/2022  7:48 PM        CONTINUE these medications which have CHANGED    Details   furosemide (Lasix) 40 mg tablet Take 2 Tablets by mouth Before breakfast and dinner for 2 days. , Normal, Disp-8 Tablet, R-0           CONTINUE these medications which have NOT CHANGED    Details   minoxidiL (LONITEN) 10 mg tablet Take 5 mg by mouth two (2) times a day., Historical Med      hydrALAZINE (APRESOLINE) 100 mg tablet Take 1 Tablet by mouth three (3) times daily. , Normal, Disp-90 Tablet, R-0      carvediloL (COREG) 25 mg tablet Take 1 Tablet by mouth two (2) times daily (with meals). , Normal, Disp-60 Tablet, R-0      amLODIPine (NORVASC) 10 mg tablet Take 10 mg by mouth daily. , Historical Med      atorvastatin (LIPITOR) 40 mg tablet Take 40 mg by mouth daily. , Historical Med      ferrous sulfate 325 mg (65 mg iron) tablet Take 325 mg by mouth two (2) times daily (with meals). , Historical Med      isosorbide mononitrate (ISMO, MONOKET) 20 mg tablet Take 20 mg by mouth two (2) times a day., Historical Med SITagliptin (JANUVIA) 50 mg tablet Take 100 mg by mouth in the morning., Historical Med      sodium bicarbonate 650 mg tablet Take 1,300 mg by mouth three (3) times daily. , Historical Med      glipiZIDE (GLUCOTROL) 5 mg tablet Take 5 mg by mouth two (2) times a day., Historical Med              Diagnosis/Clinical Impression     Clinical Impression:   1. Chest pain with high risk for cardiac etiology    2. Congestive heart failure, unspecified HF chronicity, unspecified heart failure type [facility])        Attestations: Alonzo Cardoso MD, am the primary clinician of record. Please note that this dictation was completed with [employer], the [employer] voice recognition software. Quite often unanticipated grammatical, syntax, homophones, and other interpretive errors are inadvertently transcribed by the computer software. Please disregard these errors. Please excuse any errors that have escaped final proofreading. Thank you. incidence of post-transplant  Malignancy. But this is not well studied for pre-transplant risk  https://pubmed.ncbi.nlm.nih.gov/08828031/  -Unfortunately, we are also unclear what is driving his high PRA. One thought is blood transfusion related increase, though most recent PRA was reported to be drawn prior to transfusion on 6/28 (also received 2u plasma 6/17)     Thank you for this interesting consult. We do not currently have further recommendations and will sign off. Please do not hesitate to page if there are any further questions or concerns.    Patient was seen and plan of care was discussed with attending physician Dr. Toro. Attestation to follow.     Time spent on consult: 45 minutes     Shen Ceron MD  PGY4  Hematology/Oncology   Pager: [phone]

## 2022-08-06 NOTE — PLAN OF CARE
"SLP: SLP is currently following pt for dysphagia management. Appreciate ENT recommendation for voice therapy given \"global deconditioning due to his complex hospital course...contributing to weakness of the muscles used in phonation and articulation.\" No vocal fold dysfunction seen on scope.     After again working with pt today, it is clear pt is not appropriate for voice tx at this time d/t mental status/cognition as well as poor tolerance for therapy (currently only OT is following, PT holding). SLP can complete voice eval/tx if/when appropriate while pt is hospitalized but suspect 1) the pt's voice will improve as his overall strength/cognition improve, and 2) pt likely more appropriate for OP SLP voice tx when he is medically stable and discharged from the hospital. SLP will continue to follow for diet tolerance/swallowing tx.       "

## 2022-08-06 NOTE — PLAN OF CARE
Goal Outcome Evaluation:    Neuro: A&Ox4. Anxious and reports anxiety. Scheduled Seroquel given this afternoon. He is forgetful at baseline.  Cardiac: SR-ST. HR 90-100s. Heat Mate 3 LVAD- Numbers are within defined limits. MAPs and pulses dopplered. 1st degree AV block.   Respiratory: RA- stating above 95%.   GI/: Adequate urine output. Using urinal. Up to bedside commode. Loose BM x3. PRN imodium given.   Diet/appetite: Tolerating regular diet.  Activity:  Assist of 1-2 with gait belt and walker.  Pain: Pt c/o chronic shoulder pain. Oxycodone given x2. Dilaudid given x1.   Skin: Blanchable redness to coccyx.   Pt had a bloody nose this evening and has packing in place.   LDA's: L PIV. HM3 LVAD.     Plan: Continue with POC. Notify primary team with changes

## 2022-08-06 NOTE — PLAN OF CARE
"Blood pressure 94/80, pulse 105, temperature 97.7  F (36.5  C), temperature source Oral, resp. rate 20, height 1.702 m (5' 7\"), weight 63.5 kg (139 lb 15.9 oz), SpO2 93 %.  Pt VSS, on RA.  LVAD HM3 with no alarms.  Oriented x 4, but forgetful.  Sitter remains at bedside.  NJ with cycled TF's.  Regular diet, calorie counts ordered to start tomorrow.  BG q 4 hours.  Good UOP using urinal.  Loose stools, up to commode.  Up  To chair multiple times throughout the day, up with 2 assist and walker.  Following sternal precautions.  K+ replaced per protocol.  Recheck in AM.  Continue to monitor.                       "

## 2022-08-07 ENCOUNTER — APPOINTMENT (OUTPATIENT)
Dept: CT IMAGING | Facility: CLINIC | Age: 62
DRG: 001 | End: 2022-08-07
Attending: INTERNAL MEDICINE
Payer: COMMERCIAL

## 2022-08-07 ENCOUNTER — ANESTHESIA (OUTPATIENT)
Dept: INTENSIVE CARE | Facility: CLINIC | Age: 62
DRG: 001 | End: 2022-08-07
Payer: COMMERCIAL

## 2022-08-07 ENCOUNTER — APPOINTMENT (OUTPATIENT)
Dept: GENERAL RADIOLOGY | Facility: CLINIC | Age: 62
DRG: 001 | End: 2022-08-07
Attending: INTERNAL MEDICINE
Payer: COMMERCIAL

## 2022-08-07 ENCOUNTER — ANESTHESIA EVENT (OUTPATIENT)
Dept: INTENSIVE CARE | Facility: CLINIC | Age: 62
End: 2022-08-07

## 2022-08-07 LAB
ABO/RH(D): NORMAL
ALBUMIN SERPL BCG-MCNC: 2.9 G/DL (ref 3.5–5.2)
ALBUMIN SERPL BCG-MCNC: 3.1 G/DL (ref 3.5–5.2)
ALBUMIN SERPL BCG-MCNC: 3.1 G/DL (ref 3.5–5.2)
ALP SERPL-CCNC: 150 U/L (ref 40–129)
ALP SERPL-CCNC: 166 U/L (ref 40–129)
ALP SERPL-CCNC: 214 U/L (ref 40–129)
ALT SERPL W P-5'-P-CCNC: 20 U/L (ref 10–50)
ALT SERPL W P-5'-P-CCNC: 20 U/L (ref 10–50)
ALT SERPL W P-5'-P-CCNC: 21 U/L (ref 10–50)
AMMONIA PLAS-SCNC: 34 UMOL/L (ref 16–60)
ANION GAP SERPL CALCULATED.3IONS-SCNC: 10 MMOL/L (ref 7–15)
ANION GAP SERPL CALCULATED.3IONS-SCNC: 8 MMOL/L (ref 7–15)
ANION GAP SERPL CALCULATED.3IONS-SCNC: 9 MMOL/L (ref 7–15)
ANTIBODY SCREEN: NEGATIVE
APTT PPP: 65 SECONDS (ref 22–38)
APTT PPP: 89 SECONDS (ref 22–38)
AST SERPL W P-5'-P-CCNC: 31 U/L (ref 10–50)
AST SERPL W P-5'-P-CCNC: 40 U/L (ref 10–50)
AST SERPL W P-5'-P-CCNC: 51 U/L (ref 10–50)
BACTERIA BLD CULT: NO GROWTH
BASE EXCESS BLDA CALC-SCNC: -0.7 MMOL/L (ref -9–1.8)
BASE EXCESS BLDA CALC-SCNC: 3.1 MMOL/L (ref -9–1.8)
BASE EXCESS BLDA CALC-SCNC: 4.1 MMOL/L (ref -9–1.8)
BASOPHILS # BLD AUTO: 0.1 10E3/UL (ref 0–0.2)
BASOPHILS NFR BLD AUTO: 1 %
BILIRUB SERPL-MCNC: 0.4 MG/DL
BILIRUB SERPL-MCNC: 0.6 MG/DL
BILIRUB SERPL-MCNC: 0.8 MG/DL
BLD PROD TYP BPU: NORMAL
BLOOD COMPONENT TYPE: NORMAL
BUN SERPL-MCNC: 26.3 MG/DL (ref 8–23)
BUN SERPL-MCNC: 30.1 MG/DL (ref 8–23)
BUN SERPL-MCNC: 31.5 MG/DL (ref 8–23)
CA-I BLD-MCNC: 4.6 MG/DL (ref 4.4–5.2)
CALCIUM SERPL-MCNC: 8.1 MG/DL (ref 8.8–10.2)
CALCIUM SERPL-MCNC: 8.6 MG/DL (ref 8.8–10.2)
CALCIUM SERPL-MCNC: 8.6 MG/DL (ref 8.8–10.2)
CHLORIDE SERPL-SCNC: 101 MMOL/L (ref 98–107)
CHLORIDE SERPL-SCNC: 95 MMOL/L (ref 98–107)
CHLORIDE SERPL-SCNC: 99 MMOL/L (ref 98–107)
CODING SYSTEM: NORMAL
COHGB MFR BLD: 100 % (ref 92–100)
CREAT SERPL-MCNC: 0.67 MG/DL (ref 0.67–1.17)
CREAT SERPL-MCNC: 0.67 MG/DL (ref 0.67–1.17)
CREAT SERPL-MCNC: 0.69 MG/DL (ref 0.67–1.17)
CROSSMATCH: NORMAL
DEPRECATED HCO3 PLAS-SCNC: 25 MMOL/L (ref 22–29)
DEPRECATED HCO3 PLAS-SCNC: 26 MMOL/L (ref 22–29)
DEPRECATED HCO3 PLAS-SCNC: 28 MMOL/L (ref 22–29)
EOSINOPHIL # BLD AUTO: 0.5 10E3/UL (ref 0–0.7)
EOSINOPHIL NFR BLD AUTO: 4 %
ERYTHROCYTE [DISTWIDTH] IN BLOOD BY AUTOMATED COUNT: 16.2 % (ref 10–15)
ERYTHROCYTE [DISTWIDTH] IN BLOOD BY AUTOMATED COUNT: 16.6 % (ref 10–15)
FIBRINOGEN PPP-MCNC: 376 MG/DL (ref 170–490)
GFR SERPL CREATININE-BSD FRML MDRD: >90 ML/MIN/1.73M2
GLUCOSE BLDC GLUCOMTR-MCNC: 100 MG/DL (ref 70–99)
GLUCOSE BLDC GLUCOMTR-MCNC: 111 MG/DL (ref 70–99)
GLUCOSE BLDC GLUCOMTR-MCNC: 154 MG/DL (ref 70–99)
GLUCOSE BLDC GLUCOMTR-MCNC: 91 MG/DL (ref 70–99)
GLUCOSE BLDC GLUCOMTR-MCNC: 98 MG/DL (ref 70–99)
GLUCOSE BLDC GLUCOMTR-MCNC: 98 MG/DL (ref 70–99)
GLUCOSE SERPL-MCNC: 100 MG/DL (ref 70–99)
GLUCOSE SERPL-MCNC: 115 MG/DL (ref 70–99)
GLUCOSE SERPL-MCNC: 118 MG/DL (ref 70–99)
HCO3 BLD-SCNC: 26 MMOL/L (ref 21–28)
HCO3 BLD-SCNC: 28 MMOL/L (ref 21–28)
HCO3 BLD-SCNC: 28 MMOL/L (ref 21–28)
HCO3 BLDA-SCNC: 28 MMOL/L (ref 21–28)
HCT VFR BLD AUTO: 26.6 % (ref 40–53)
HCT VFR BLD AUTO: 27.4 % (ref 40–53)
HGB BLD-MCNC: 6.4 G/DL (ref 13.3–17.7)
HGB BLD-MCNC: 8.4 G/DL (ref 13.3–17.7)
HGB BLD-MCNC: 8.5 G/DL (ref 13.3–17.7)
HGB BLD-MCNC: 8.9 G/DL (ref 13.3–17.7)
HGB BLD-MCNC: 9.1 G/DL (ref 13.3–17.7)
HOLD SPECIMEN: NORMAL
IMM GRANULOCYTES # BLD: 0.1 10E3/UL
IMM GRANULOCYTES NFR BLD: 1 %
INR PPP: 2.5 (ref 0.85–1.15)
INR PPP: 2.72 (ref 0.85–1.15)
INR PPP: 3.04 (ref 0.85–1.15)
ISSUE DATE AND TIME: NORMAL
LACTATE BLD-SCNC: 1.8 MMOL/L
LACTATE SERPL-SCNC: 1.6 MMOL/L (ref 0.7–2)
LACTATE SERPL-SCNC: 1.9 MMOL/L (ref 0.7–2)
LYMPHOCYTES # BLD AUTO: 1.6 10E3/UL (ref 0.8–5.3)
LYMPHOCYTES NFR BLD AUTO: 13 %
MAGNESIUM SERPL-MCNC: 1.8 MG/DL (ref 1.7–2.3)
MCH RBC QN AUTO: 28.8 PG (ref 26.5–33)
MCH RBC QN AUTO: 28.8 PG (ref 26.5–33)
MCHC RBC AUTO-ENTMCNC: 31 G/DL (ref 31.5–36.5)
MCHC RBC AUTO-ENTMCNC: 31.6 G/DL (ref 31.5–36.5)
MCV RBC AUTO: 91 FL (ref 78–100)
MCV RBC AUTO: 93 FL (ref 78–100)
MONOCYTES # BLD AUTO: 1 10E3/UL (ref 0–1.3)
MONOCYTES NFR BLD AUTO: 9 %
NEUTROPHILS # BLD AUTO: 8.8 10E3/UL (ref 1.6–8.3)
NEUTROPHILS NFR BLD AUTO: 72 %
NRBC # BLD AUTO: 0 10E3/UL
NRBC BLD AUTO-RTO: 0 /100
O2/TOTAL GAS SETTING VFR VENT: 50 %
O2/TOTAL GAS SETTING VFR VENT: 80 %
O2/TOTAL GAS SETTING VFR VENT: 80 %
OXYHGB MFR BLD: 98 % (ref 92–100)
OXYHGB MFR BLD: 98 % (ref 92–100)
PCO2 BLD: 39 MM HG (ref 35–45)
PCO2 BLD: 42 MM HG (ref 35–45)
PCO2 BLD: 50 MM HG (ref 35–45)
PCO2 BLDA: 54 MM HG (ref 35–45)
PH BLD: 7.32 [PH] (ref 7.35–7.45)
PH BLD: 7.43 [PH] (ref 7.35–7.45)
PH BLD: 7.46 [PH] (ref 7.35–7.45)
PH BLDA: 7.32 [PH] (ref 7.35–7.45)
PHOSPHATE SERPL-MCNC: 4.2 MG/DL (ref 2.5–4.5)
PLATELET # BLD AUTO: 314 10E3/UL (ref 150–450)
PLATELET # BLD AUTO: 479 10E3/UL (ref 150–450)
PO2 BLD: 162 MM HG (ref 80–105)
PO2 BLD: 247 MM HG (ref 80–105)
PO2 BLD: 271 MM HG (ref 80–105)
PO2 BLDA: 296 MM HG (ref 80–105)
POTASSIUM SERPL-SCNC: 4.6 MMOL/L (ref 3.4–5.3)
POTASSIUM SERPL-SCNC: 4.9 MMOL/L (ref 3.4–5.3)
POTASSIUM SERPL-SCNC: 5.6 MMOL/L (ref 3.4–5.3)
PROT SERPL-MCNC: 6 G/DL (ref 6.4–8.3)
PROT SERPL-MCNC: 6.1 G/DL (ref 6.4–8.3)
PROT SERPL-MCNC: 6.7 G/DL (ref 6.4–8.3)
RBC # BLD AUTO: 2.92 10E6/UL (ref 4.4–5.9)
RBC # BLD AUTO: 2.95 10E6/UL (ref 4.4–5.9)
SODIUM SERPL-SCNC: 133 MMOL/L (ref 136–145)
SODIUM SERPL-SCNC: 133 MMOL/L (ref 136–145)
SODIUM SERPL-SCNC: 135 MMOL/L (ref 136–145)
SPECIMEN EXPIRATION DATE: NORMAL
UNIT ABO/RH: NORMAL
UNIT NUMBER: NORMAL
UNIT STATUS: NORMAL
UNIT TYPE ISBT: 5100
UNIT TYPE ISBT: 600
UNIT TYPE ISBT: 6200
UNIT TYPE ISBT: 7300
UNIT TYPE ISBT: 8400
UNIT TYPE ISBT: 9500
WBC # BLD AUTO: 12.1 10E3/UL (ref 4–11)
WBC # BLD AUTO: 17 10E3/UL (ref 4–11)

## 2022-08-07 PROCEDURE — 94002 VENT MGMT INPAT INIT DAY: CPT

## 2022-08-07 PROCEDURE — 84155 ASSAY OF PROTEIN SERUM: CPT

## 2022-08-07 PROCEDURE — P9016 RBC LEUKOCYTES REDUCED: HCPCS | Performed by: INTERNAL MEDICINE

## 2022-08-07 PROCEDURE — 250N000013 HC RX MED GY IP 250 OP 250 PS 637: Performed by: SURGERY

## 2022-08-07 PROCEDURE — 250N000013 HC RX MED GY IP 250 OP 250 PS 637: Performed by: INTERNAL MEDICINE

## 2022-08-07 PROCEDURE — 86850 RBC ANTIBODY SCREEN: CPT

## 2022-08-07 PROCEDURE — 83735 ASSAY OF MAGNESIUM: CPT | Performed by: SURGERY

## 2022-08-07 PROCEDURE — 93750 INTERROGATION VAD IN PERSON: CPT | Performed by: INTERNAL MEDICINE

## 2022-08-07 PROCEDURE — 250N000009 HC RX 250: Performed by: ANESTHESIOLOGY

## 2022-08-07 PROCEDURE — 71045 X-RAY EXAM CHEST 1 VIEW: CPT | Mod: 26 | Performed by: RADIOLOGY

## 2022-08-07 PROCEDURE — 85730 THROMBOPLASTIN TIME PARTIAL: CPT

## 2022-08-07 PROCEDURE — 80053 COMPREHEN METABOLIC PANEL: CPT | Performed by: SURGERY

## 2022-08-07 PROCEDURE — 93010 ELECTROCARDIOGRAM REPORT: CPT | Performed by: INTERNAL MEDICINE

## 2022-08-07 PROCEDURE — 999N000127 HC STATISTIC PERIPHERAL IV START W US GUIDANCE

## 2022-08-07 PROCEDURE — 250N000011 HC RX IP 250 OP 636: Performed by: THORACIC SURGERY (CARDIOTHORACIC VASCULAR SURGERY)

## 2022-08-07 PROCEDURE — 250N000011 HC RX IP 250 OP 636: Performed by: STUDENT IN AN ORGANIZED HEALTH CARE EDUCATION/TRAINING PROGRAM

## 2022-08-07 PROCEDURE — 36415 COLL VENOUS BLD VENIPUNCTURE: CPT

## 2022-08-07 PROCEDURE — 84450 TRANSFERASE (AST) (SGOT): CPT | Performed by: STUDENT IN AN ORGANIZED HEALTH CARE EDUCATION/TRAINING PROGRAM

## 2022-08-07 PROCEDURE — 99291 CRITICAL CARE FIRST HOUR: CPT | Mod: 25 | Performed by: INTERNAL MEDICINE

## 2022-08-07 PROCEDURE — 250N000011 HC RX IP 250 OP 636: Performed by: SURGERY

## 2022-08-07 PROCEDURE — 85018 HEMOGLOBIN: CPT

## 2022-08-07 PROCEDURE — 82803 BLOOD GASES ANY COMBINATION: CPT | Performed by: SURGERY

## 2022-08-07 PROCEDURE — 99292 CRITICAL CARE ADDL 30 MIN: CPT | Performed by: INTERNAL MEDICINE

## 2022-08-07 PROCEDURE — 250N000009 HC RX 250

## 2022-08-07 PROCEDURE — 85610 PROTHROMBIN TIME: CPT

## 2022-08-07 PROCEDURE — 250N000013 HC RX MED GY IP 250 OP 250 PS 637: Performed by: PHYSICIAN ASSISTANT

## 2022-08-07 PROCEDURE — 999N000157 HC STATISTIC RCP TIME EA 10 MIN

## 2022-08-07 PROCEDURE — 83605 ASSAY OF LACTIC ACID: CPT | Performed by: SURGERY

## 2022-08-07 PROCEDURE — 258N000003 HC RX IP 258 OP 636: Performed by: PHYSICIAN ASSISTANT

## 2022-08-07 PROCEDURE — 36415 COLL VENOUS BLD VENIPUNCTURE: CPT | Performed by: SURGERY

## 2022-08-07 PROCEDURE — C9113 INJ PANTOPRAZOLE SODIUM, VIA: HCPCS | Performed by: STUDENT IN AN ORGANIZED HEALTH CARE EDUCATION/TRAINING PROGRAM

## 2022-08-07 PROCEDURE — 250N000011 HC RX IP 250 OP 636: Performed by: PHYSICIAN ASSISTANT

## 2022-08-07 PROCEDURE — C9113 INJ PANTOPRAZOLE SODIUM, VIA: HCPCS | Performed by: PHYSICIAN ASSISTANT

## 2022-08-07 PROCEDURE — 999N000065 XR ABDOMEN PORT 1 VIEW

## 2022-08-07 PROCEDURE — 258N000003 HC RX IP 258 OP 636: Performed by: STUDENT IN AN ORGANIZED HEALTH CARE EDUCATION/TRAINING PROGRAM

## 2022-08-07 PROCEDURE — 74018 RADEX ABDOMEN 1 VIEW: CPT | Mod: 26 | Performed by: RADIOLOGY

## 2022-08-07 PROCEDURE — P9016 RBC LEUKOCYTES REDUCED: HCPCS | Performed by: SURGERY

## 2022-08-07 PROCEDURE — 84100 ASSAY OF PHOSPHORUS: CPT | Performed by: SURGERY

## 2022-08-07 PROCEDURE — 86901 BLOOD TYPING SEROLOGIC RH(D): CPT

## 2022-08-07 PROCEDURE — 93451 RIGHT HEART CATH: CPT | Performed by: INTERNAL MEDICINE

## 2022-08-07 PROCEDURE — 272N000010 HC KIT CATH ARTERIAL EXT SUPPLY

## 2022-08-07 PROCEDURE — 82140 ASSAY OF AMMONIA: CPT | Performed by: SURGERY

## 2022-08-07 PROCEDURE — 250N000011 HC RX IP 250 OP 636

## 2022-08-07 PROCEDURE — P9059 PLASMA, FRZ BETWEEN 8-24HOUR: HCPCS | Performed by: INTERNAL MEDICINE

## 2022-08-07 PROCEDURE — 36620 INSERTION CATHETER ARTERY: CPT | Performed by: INTERNAL MEDICINE

## 2022-08-07 PROCEDURE — 85027 COMPLETE CBC AUTOMATED: CPT | Performed by: SURGERY

## 2022-08-07 PROCEDURE — 94002 VENT MGMT INPAT INIT DAY: CPT | Performed by: INTERNAL MEDICINE

## 2022-08-07 PROCEDURE — 999N000185 HC STATISTIC TRANSPORT TIME EA 15 MIN

## 2022-08-07 PROCEDURE — 83605 ASSAY OF LACTIC ACID: CPT

## 2022-08-07 PROCEDURE — 82805 BLOOD GASES W/O2 SATURATION: CPT

## 2022-08-07 PROCEDURE — P9056 BLOOD, L/R, IRRADIATED: HCPCS | Performed by: INTERNAL MEDICINE

## 2022-08-07 PROCEDURE — 258N000003 HC RX IP 258 OP 636: Performed by: THORACIC SURGERY (CARDIOTHORACIC VASCULAR SURGERY)

## 2022-08-07 PROCEDURE — 250N000013 HC RX MED GY IP 250 OP 250 PS 637: Performed by: STUDENT IN AN ORGANIZED HEALTH CARE EDUCATION/TRAINING PROGRAM

## 2022-08-07 PROCEDURE — P9059 PLASMA, FRZ BETWEEN 8-24HOUR: HCPCS

## 2022-08-07 PROCEDURE — 85018 HEMOGLOBIN: CPT | Performed by: PHYSICIAN ASSISTANT

## 2022-08-07 PROCEDURE — P9037 PLATE PHERES LEUKOREDU IRRAD: HCPCS | Performed by: INTERNAL MEDICINE

## 2022-08-07 PROCEDURE — 85025 COMPLETE CBC W/AUTO DIFF WBC: CPT

## 2022-08-07 PROCEDURE — 84155 ASSAY OF PROTEIN SERUM: CPT | Performed by: STUDENT IN AN ORGANIZED HEALTH CARE EDUCATION/TRAINING PROGRAM

## 2022-08-07 PROCEDURE — 70450 CT HEAD/BRAIN W/O DYE: CPT | Mod: 26 | Performed by: RADIOLOGY

## 2022-08-07 PROCEDURE — 84450 TRANSFERASE (AST) (SGOT): CPT

## 2022-08-07 PROCEDURE — 250N000013 HC RX MED GY IP 250 OP 250 PS 637: Performed by: THORACIC SURGERY (CARDIOTHORACIC VASCULAR SURGERY)

## 2022-08-07 PROCEDURE — 82330 ASSAY OF CALCIUM: CPT | Performed by: SURGERY

## 2022-08-07 PROCEDURE — 82803 BLOOD GASES ANY COMBINATION: CPT

## 2022-08-07 PROCEDURE — 250N000011 HC RX IP 250 OP 636: Performed by: ANESTHESIOLOGY

## 2022-08-07 PROCEDURE — 93005 ELECTROCARDIOGRAM TRACING: CPT

## 2022-08-07 PROCEDURE — 86923 COMPATIBILITY TEST ELECTRIC: CPT | Performed by: SURGERY

## 2022-08-07 PROCEDURE — 36556 INSERT NON-TUNNEL CV CATH: CPT | Performed by: INTERNAL MEDICINE

## 2022-08-07 PROCEDURE — 70450 CT HEAD/BRAIN W/O DYE: CPT

## 2022-08-07 PROCEDURE — 86923 COMPATIBILITY TEST ELECTRIC: CPT | Performed by: INTERNAL MEDICINE

## 2022-08-07 PROCEDURE — 999N000065 XR CHEST PORT 1 VIEW

## 2022-08-07 PROCEDURE — 85384 FIBRINOGEN ACTIVITY: CPT

## 2022-08-07 PROCEDURE — 370N000003 HC ANESTHESIA WARD SERVICE

## 2022-08-07 PROCEDURE — 999N000015 HC STATISTIC ARTERIAL MONITORING DAILY

## 2022-08-07 PROCEDURE — 200N000002 HC R&B ICU UMMC

## 2022-08-07 RX ORDER — LIDOCAINE HYDROCHLORIDE 10 MG/ML
5 INJECTION, SOLUTION EPIDURAL; INFILTRATION; INTRACAUDAL; PERINEURAL ONCE
Status: COMPLETED | OUTPATIENT
Start: 2022-08-07 | End: 2022-08-07

## 2022-08-07 RX ORDER — HYDROMORPHONE HCL IN WATER/PF 6 MG/30 ML
0.2 PATIENT CONTROLLED ANALGESIA SYRINGE INTRAVENOUS EVERY 6 HOURS PRN
Status: DISCONTINUED | OUTPATIENT
Start: 2022-08-07 | End: 2022-08-10

## 2022-08-07 RX ORDER — NOREPINEPHRINE BITARTRATE 0.06 MG/ML
.01-.6 INJECTION, SOLUTION INTRAVENOUS CONTINUOUS
Status: DISCONTINUED | OUTPATIENT
Start: 2022-08-07 | End: 2022-08-09

## 2022-08-07 RX ORDER — QUETIAPINE FUMARATE 50 MG/1
100 TABLET, FILM COATED ORAL AT BEDTIME
Status: DISCONTINUED | OUTPATIENT
Start: 2022-08-07 | End: 2022-08-10

## 2022-08-07 RX ORDER — OXYMETAZOLINE HYDROCHLORIDE 0.05 G/100ML
2 SPRAY NASAL 2 TIMES DAILY
Status: DISPENSED | OUTPATIENT
Start: 2022-08-07 | End: 2022-08-09

## 2022-08-07 RX ORDER — MIDAZOLAM HCL IN 0.9 % NACL/PF 1 MG/ML
1-8 PLASTIC BAG, INJECTION (ML) INTRAVENOUS CONTINUOUS
Status: DISCONTINUED | OUTPATIENT
Start: 2022-08-07 | End: 2022-08-08

## 2022-08-07 RX ORDER — MAGNESIUM OXIDE 400 MG/1
400 TABLET ORAL EVERY 4 HOURS
Status: DISPENSED | OUTPATIENT
Start: 2022-08-07 | End: 2022-08-07

## 2022-08-07 RX ORDER — TRANEXAMIC ACID 100 MG/ML
INJECTION, SOLUTION INTRAVENOUS ONCE
Status: DISCONTINUED | OUTPATIENT
Start: 2022-08-07 | End: 2022-08-09

## 2022-08-07 RX ORDER — DEXTROSE MONOHYDRATE 50 MG/ML
INJECTION, SOLUTION INTRAVENOUS CONTINUOUS
Status: DISCONTINUED | OUTPATIENT
Start: 2022-08-07 | End: 2022-08-08

## 2022-08-07 RX ADMIN — QUETIAPINE FUMARATE 25 MG: 25 TABLET ORAL at 07:19

## 2022-08-07 RX ADMIN — DEXTROSE MONOHYDRATE: 50 INJECTION, SOLUTION INTRAVENOUS at 17:00

## 2022-08-07 RX ADMIN — Medication 2 MG/HR: at 10:06

## 2022-08-07 RX ADMIN — INSULIN ASPART 1 UNITS: 100 INJECTION, SOLUTION INTRAVENOUS; SUBCUTANEOUS at 03:26

## 2022-08-07 RX ADMIN — EPINEPHRINE 10 MCG: 1 INJECTION INTRAMUSCULAR; INTRAVENOUS; SUBCUTANEOUS at 09:05

## 2022-08-07 RX ADMIN — Medication 100 MCG/HR: at 10:04

## 2022-08-07 RX ADMIN — ACETAMINOPHEN 975 MG: 325 TABLET, FILM COATED ORAL at 22:00

## 2022-08-07 RX ADMIN — ROCURONIUM BROMIDE 90 MG: 50 INJECTION, SOLUTION INTRAVENOUS at 09:04

## 2022-08-07 RX ADMIN — CEFEPIME HYDROCHLORIDE 2 G: 2 INJECTION, POWDER, FOR SOLUTION INTRAVENOUS at 20:17

## 2022-08-07 RX ADMIN — ACETAMINOPHEN 975 MG: 325 TABLET, FILM COATED ORAL at 13:56

## 2022-08-07 RX ADMIN — PANTOPRAZOLE SODIUM 40 MG: 40 INJECTION, POWDER, FOR SOLUTION INTRAVENOUS at 14:09

## 2022-08-07 RX ADMIN — EPINEPHRINE 20 MCG: 1 INJECTION INTRAMUSCULAR; INTRAVENOUS; SUBCUTANEOUS at 09:07

## 2022-08-07 RX ADMIN — HYDROMORPHONE HYDROCHLORIDE 0.2 MG: 0.2 INJECTION, SOLUTION INTRAMUSCULAR; INTRAVENOUS; SUBCUTANEOUS at 05:51

## 2022-08-07 RX ADMIN — PHYTONADIONE 2 MG: 10 INJECTION, EMULSION INTRAMUSCULAR; INTRAVENOUS; SUBCUTANEOUS at 12:38

## 2022-08-07 RX ADMIN — Medication 0.3 MCG/KG/MIN: at 09:26

## 2022-08-07 RX ADMIN — SERTRALINE HYDROCHLORIDE 50 MG: 50 TABLET ORAL at 07:19

## 2022-08-07 RX ADMIN — OLANZAPINE 5 MG: 5 TABLET, FILM COATED ORAL at 07:20

## 2022-08-07 RX ADMIN — LIDOCAINE PATCH 4% 1 PATCH: 40 PATCH TOPICAL at 08:29

## 2022-08-07 RX ADMIN — HYDROXYCHLOROQUINE SULFATE 200 MG: 200 TABLET, FILM COATED ORAL at 20:19

## 2022-08-07 RX ADMIN — VANCOMYCIN HYDROCHLORIDE 750 MG: 10 INJECTION, POWDER, LYOPHILIZED, FOR SOLUTION INTRAVENOUS at 17:58

## 2022-08-07 RX ADMIN — OXYCODONE HYDROCHLORIDE 5 MG: 5 TABLET ORAL at 03:04

## 2022-08-07 RX ADMIN — LIDOCAINE HYDROCHLORIDE 5 ML: 10 INJECTION, SOLUTION EPIDURAL; INFILTRATION; INTRACAUDAL; PERINEURAL at 02:16

## 2022-08-07 RX ADMIN — HYDROMORPHONE HYDROCHLORIDE 0.4 MG: 0.2 INJECTION, SOLUTION INTRAMUSCULAR; INTRAVENOUS; SUBCUTANEOUS at 02:28

## 2022-08-07 RX ADMIN — Medication 400 MG: at 04:43

## 2022-08-07 RX ADMIN — ONDANSETRON 4 MG: 2 INJECTION INTRAMUSCULAR; INTRAVENOUS at 01:49

## 2022-08-07 RX ADMIN — DIGOXIN 125 MCG: 125 TABLET ORAL at 13:56

## 2022-08-07 RX ADMIN — HYDROXYCHLOROQUINE SULFATE 200 MG: 200 TABLET, FILM COATED ORAL at 14:12

## 2022-08-07 RX ADMIN — CEFEPIME HYDROCHLORIDE 2 G: 2 INJECTION, POWDER, FOR SOLUTION INTRAVENOUS at 13:39

## 2022-08-07 RX ADMIN — QUETIAPINE FUMARATE 25 MG: 25 TABLET ORAL at 16:08

## 2022-08-07 RX ADMIN — Medication 10 MG: at 22:00

## 2022-08-07 RX ADMIN — QUETIAPINE FUMARATE 100 MG: 50 TABLET ORAL at 22:00

## 2022-08-07 RX ADMIN — VANCOMYCIN HYDROCHLORIDE 750 MG: 10 INJECTION, POWDER, LYOPHILIZED, FOR SOLUTION INTRAVENOUS at 05:53

## 2022-08-07 RX ADMIN — Medication 16 MG: at 09:04

## 2022-08-07 RX ADMIN — NYSTATIN 1000000 UNITS: 100000 SUSPENSION ORAL at 14:06

## 2022-08-07 RX ADMIN — NYSTATIN 1000000 UNITS: 100000 SUSPENSION ORAL at 20:17

## 2022-08-07 RX ADMIN — Medication 1 PACKET: at 17:05

## 2022-08-07 RX ADMIN — MULTIVITAMIN 15 ML: LIQUID ORAL at 13:58

## 2022-08-07 RX ADMIN — OXYCODONE HYDROCHLORIDE 5 MG: 5 TABLET ORAL at 06:56

## 2022-08-07 RX ADMIN — OLANZAPINE 5 MG: 5 TABLET, FILM COATED ORAL at 20:21

## 2022-08-07 RX ADMIN — OXYMETAZOLINE HYDROCHLORIDE 2 SPRAY: 0.05 SPRAY NASAL at 01:29

## 2022-08-07 RX ADMIN — ACETAMINOPHEN 650 MG: 325 TABLET, FILM COATED ORAL at 06:56

## 2022-08-07 RX ADMIN — PANTOPRAZOLE SODIUM 80 MG: 40 INJECTION, POWDER, FOR SOLUTION INTRAVENOUS at 20:13

## 2022-08-07 ASSESSMENT — ACTIVITIES OF DAILY LIVING (ADL)
ADLS_ACUITY_SCORE: 36
ADLS_ACUITY_SCORE: 42
ADLS_ACUITY_SCORE: 42
ADLS_ACUITY_SCORE: 36
ADLS_ACUITY_SCORE: 38
ADLS_ACUITY_SCORE: 36
ADLS_ACUITY_SCORE: 36

## 2022-08-07 NOTE — CODE/RAPID RESPONSE
RRT called for Airway protection with  continued excessive nose bleeding . Patient coughing up large blood clots . ENT at bedside . Patient Hemodynamically stable . LVAD functioning with no alarms . Parameters WNL . Plan for stat CBC . INR and am lab to be collected now .

## 2022-08-07 NOTE — PROGRESS NOTES
Calorie Count  Intake recorded for: 8/6  Total Kcals: 220 Total Protein: 10g  Kcals from Hospital Food: 220   Protein: 10g  Kcals from Outside Food (average):0 Protein: 0g  # Meals Ordered from Kitchen: 3 meals   # Meals Recorded: 0 meals  # Supplements Recorded: 100% glucerna

## 2022-08-07 NOTE — PROGRESS NOTES
D: Pt intubated with a 8.0 ETT secured 24 @ the lip for airway protection  I: Pt. Placed on a Drager ventilator . Refer to Resp Care flowsheet for vent settings   A: color change with ETCO2. Bilateral breath sounds  P: cont to monitor

## 2022-08-07 NOTE — ANESTHESIA CARE TRANSFER NOTE
Patient: Dandy Sands    Procedure: * No procedures listed *       Diagnosis: * No pre-op diagnosis entered *  Diagnosis Additional Information: No value filed.    Anesthesia Type:   No value filed.     Note:    Oropharynx: ventilatory support and endotracheal tube in place  Level of Consciousness: iatrogenic sedation and unresponsive      Independent Airway: airway patency not satisfactory and stable  Dentition: dentition unchanged  Vital Signs Stable: post-procedure vital signs reviewed and stable  Report to RN Given: handoff report given  Patient transferred to: Telemetry/Step Down Unit    Handoff Report: Identifed the Patient, Identified the Reponsible Provider, Reviewed the pertinent medical history, Discussed the surgical course, Reviewed Intra-OP anesthesia mangement and issues during anesthesia, Set expectations for post-procedure period and Allowed opportunity for questions and acknowledgement of understanding      Vitals:  Vitals Value Taken Time   /91 08/07/22 0932   Temp     Pulse 108 08/07/22 0935   Resp 12 08/07/22 0935   SpO2 98 % 08/07/22 0935   Vitals shown include unvalidated device data.    Electronically Signed By: Leslie Goldberg, MD  August 7, 2022  9:36 AM

## 2022-08-07 NOTE — CONSULTS
"Otolaryngology Consult Note  August 7, 2022      CC: Epistaxis on warfarin    HPI: Dandy Sands is a 61 year old male with a past medical history of CAD s/p PCI to LAD, MI, ICM, acute on chronic heart failure, s/p CRT-D, severe MR, antiphospholipid antibody syndrome on chronic warfarin who was last seen by ENT 2 weeks ago for recurrent epistaxis. He was noted to have an anterior septal perforation at that time that was found to be bleeding at multiple regions around the septal perforation. It was found that his NG tube bridle went through the perforation at that time and was removed.    Today, patient was found to have significant epistaxis through his left nasal passage w/ an INR 2.8. Pressure applied with afrin and was unsuccessful. Hemoglobin reported to be stable. Patient has been coughing up clots.    PMH:  Past Medical History:   Diagnosis Date     Antiphospholipid antibody syndrome (H)      CAD (coronary artery disease)      Chronic systolic heart failure (H)      Ischemic cardiomyopathy      Mitral regurgitation      Systemic lupus erythematosus (H)        ROS: 12 point review of systems is negative unless noted in HPI.    PHYSICAL EXAM:  BP 92/59   Pulse 110   Temp 97  F (36.1  C) (Oral)   Resp 14   Ht 1.702 m (5' 7\")   Wt 65 kg (143 lb 4.8 oz)   SpO2 93%   BMI 22.44 kg/m      General - lying in bed, coughing up clots, subjectively feeling short of breath but satting well  HEENT - blood clots coming from nares bilaterally. Rigid scope exam below.  Resp - maintaining sats on RA    NASAL ENDOSCOPY: Due to epistaxis, nasal endoscopy was indicated. After obtaining consent, the rigid zero degree scope was passed under endoscopic vision through the left nasal passage. Perfuse and persistent bleeding noted likely coming from the septum but septal perforation and source of bleeding could not be identified due to the extent of the bleeding. Irrigation was attempted to remove clots from the left side and " patient coughed up significant clots. Afrin was injected into bilateral nares and pressure was applied to help slow down the bleeding. However, profused bleeding prevented further identification of the source. Several attempts to place a 8cm merocel was unsuccessful, likely getting caught on septal perforation. A 4cm merocel wrapped in surgicel was placed mid septum over the suspected perforation.     Bleeding from the left nasal passage stopped, but increased bleeding on the right likely from the septal perforation. NG tube was in place and removed by nursing staff. A second 4cm merocel wrapped in surgicel was then placed on the right side. Surgiflo was inserted posterior and anterior to the merocels bilaterally. Clots were removed from the nasopharynx through the mouth. The patient was observed for 15 minutes with no further anterior or posterior oropharynx drainage noted.    ROUTINE IP LABS (Last four results)  BMP  Recent Labs   Lab 08/07/22  0231 08/06/22  2339 08/06/22  1949 08/06/22  1528 08/06/22  0757 08/06/22  0444 08/05/22  0759 08/05/22  0440 08/04/22  1131 08/04/22  0745   *  --   --   --   --  131*  --  134*  --  128*   POTASSIUM 4.6  --   --   --   --  3.8  --  3.8  --  3.9   CHLORIDE 95*  --   --   --   --  93*  --  95*  --  92*   ELDA 8.6*  --   --   --   --  8.5*  --  8.6*  --  8.7*   CO2 28  --   --   --   --  29  --  28  --  26   BUN 26.3*  --   --   --   --  28.1*  --  26.6*  --  24.7*   CR 0.67  --   --   --   --  0.72  --  0.73  --  0.75   * 146* 144* 125*   < > 129*   < > 129*   < > 164*    < > = values in this interval not displayed.     CBC  Recent Labs   Lab 08/07/22  0231 08/06/22  1939 08/06/22  1210 08/06/22  0444 08/05/22  1036 08/05/22  0440 08/04/22  1202 08/04/22  0745   WBC 12.1*  --   --  8.4  --  10.1  --  10.5   RBC 2.95*  --   --  3.02*  --  3.14*  --  3.09*   HGB 8.5* 8.6* 8.3* 8.7*  8.7*   < > 9.1*  9.1*   < > 9.1*   HCT 27.4*  --   --  28.2*  --  28.8*  --   28.9*   MCV 93  --   --  93  --  92  --  94   MCH 28.8  --   --  28.8  --  29.0  --  29.4   MCHC 31.0*  --   --  30.9*  --  31.6  --  31.5   RDW 16.6*  --   --  16.9*  --  16.9*  --  17.3*   *  --   --  390  --  385  --  370    < > = values in this interval not displayed.     INR  Recent Labs   Lab 08/07/22  0231 08/06/22  0444 08/05/22  0440 08/04/22  0745   INR 3.04* 2.81* 2.70* 2.66*       Assessment and Plan  Dandy Sands is a 61 year old male with a past medical history of CAD s/p PCI to LAD, MI, ICM, acute on chronic heart failure, s/p CRT-D, severe MR, antiphospholipid antibody syndrome on chronic warfarin who was last seen by ENT 2 weeks ago for recurrent epistaxis. Today patient had perfused bleeding from his left nasal passage that did not resolve with afrin or pressure. Due to perfuse bleeding, the source of bleeding was not able to be identified, though likely from anterior septal perforation as seen on last exam. The patient was packed with bilateral 4cm merocels and covered with surgiflo.    - Non-absorbable packing in place bilaterally - Keflex while packing in place (ordered for you)  - Begin nasal saline q2h while awake starting 8/8  - Some slow oozing is to be expected, if begins to  in quantity of bleeding would recommend spraying the nasal cavity copiously with afrin and holding pressure over the soft part of the nose for 20 minutes continuously.   - If bleeding continues despite these measures, or is severe, please contact ENT.    Nicole Cassidy, PGY2  Otolaryngology- Head and Neck Surgery  Please page ENT with questions by dialing [phone] and entering job code [id number] when prompted.

## 2022-08-07 NOTE — ANESTHESIA PROCEDURE NOTES
Airway       Patient location during procedure: Floor       Procedure Start/Stop Times: 8/7/2022 9:04 AM  Staff -        Resident/Fellow: Goldberg, Leslie, MD       Performed By: resident  Consent for Airway        Urgency: emergent       Consent: No emergent situation. Verbal consent obtained.       Consent given by: patient       Risks and benefits: risks, benefits and alternatives were not discussed       Patient identity confirmed: verbally with patient  Report Obtained from Primary Care Team       History regarding most recent potassium obtained: Yes       History regarding presence/absence of renal failure obtained:Yes       History regarding stroke/CVA obtained:Yes       History regarding presence/absence of NM disorder: YesIndications and Patient Condition       Indications for airway management: airway protection       Mallampati: Not Assessed     Induction type:intravenous       Mask difficulty assessment: 2 - vent by mask + OA or adjuvant +/- NMBA    Final Airway Details       Final airway type: endotracheal airway       Successful airway: ETT - single  Endotracheal Airway Details        ETT size (mm): 8.0       Cuffed: yes       Successful intubation technique: video laryngoscopy       VL Blade Size: MAC 4       Grade View of Cords: 1       Adjucts: stylet       Measured from: gums/teeth       Secured at (cm): 24       Bite block used: None    Post intubation assessment        ETT secured       Placement verified by: capnometry, equal breath sounds and chest rise        Number of attempts at approach: 1       Number of other approaches attempted: 0       Secured with: commercial tube partida       Ease of procedure: easy       Dentition: Intact and Unchanged    Medication(s) Administered   succinylcholine (ANECTINE) 20 mg/mL injection - Intravenous   16 mg - 8/7/2022 9:04:00 AM  rocuronium (ZEMURON) 10 mg/mL injection - Intravenous   90 mg - 8/7/2022 9:04:00 AM  Medication Administration Time: 8/7/2022  9:04 AM    Additional Comments       Indication: altered mental status due to cardiopulmonary compromise    Patient was additionally given 100 fentanyl and 2 midazolam direct from pharmacy by RN with intubation. Second dose of 2 mg midazolam and 100 fentanyl given by RN before I left the room. Plan for midazolam and fentanyl gtt.

## 2022-08-07 NOTE — CODE/RAPID RESPONSE
"   08/07/22 0207   Call Information   Date of Call 08/07/22   Time of Call 0207   Name of person requesting the team Ricky   Title of person requesting team RN   RRT Arrival time 0207  (in room already at time of page)   Time RRT ended 0245   Reason for call   Type of RRT Adult   Primary reason for call Respiratory;Uncontrolled excessive bleeding   Respiratory Other (describe)  (airway concern d/t bleeding)   Was patient transferred from the ED, ICU, or PACU within last 24 hours prior to RRT call? No   SBAR   Situation Concern for airway safety given excessive, uncontrolled nasal bleeding   Background Per provider note \"Dandy Sands is a 61 year old male with PMH of lupus, antiphospholipid syndrome, HTN, HLD, HFrEF 2/2 ICM who presented with cardiogenic shock c/b multiorgan failure (JOSE, shock liver) requiring mechanical support with IABP, now s/p HM3 LVAD 7/8/22 by Dr. Zamora with intraoperative course c/b RV failure s/p 29F Protek duo via RIJ. Post op course c/b hemopericardium s/p repeat washout with chest left open. Chest closed 7/13/22 and decannulated from RVAD 7/21\"   Notable History/Conditions Cardiac;Congestive heart failure;Hypertension;Recent surgery   Assessment Patient noted to have excessive nose bleed from bilateral nostrils. Coughing up large clots (approx size of kiwi). Vitally stable. breathing unlabored, frequently attempting to clear blood from airway. ENT resident at bedside attempting to pack nostrils.   Interventions Labs;Suction;Other (describe)  (bilateral nostril packing per ENT. 2u FFP ordered per primary team.)   Patient Outcome   Patient Outcome Stabilized on unit   RRT Team   Attending/Primary/Covering Physician Dr. Darius Zamora   Date Attending Physician notified 08/07/22   Time Attending Physician notified 0207   Physician(s) Brittaney Lopez PA-C   Lead RN Shania García   Post RRT Intervention Assessment   Post RRT Assessment Stable/Improved   Date Follow Up Done 08/07/22 "   Time Follow Up Done 0540   Comments Remains vitally stable. Nasal packing remains in place, although is begining to slip out of left nare. Still coughing up large clots, protecting airway. ENT aware of packing issues and clots. INR and Hgb rechecks scheduled for 0800.

## 2022-08-07 NOTE — PROVIDER NOTIFICATION
"   08/07/22 0844   Call Information   Date of Call 08/07/22   Time of Call 0844   Name of person requesting the team Radha Jade   Title of person requesting team RN   RRT Arrival time 0844   Time RRT ended 0938   Reason for call   Type of RRT Adult   Primary reason for call Respiratory   Respiratory   (Compromised airway d/t heavy nasal bleeding.)   Was patient transferred from the ED, ICU, or PACU within last 24 hours prior to RRT call? No   SBAR   Situation Immediate Code Blue called d/t airway compromise with heavy nasal bleeding.   Background Per provider note \"Dandy Sands is a 61 year old male with PMH of lupus, antiphospholipid syndrome, HTN, HLD, HFrEF 2/2 ICM who presented with cardiogenic shock c/b multiorgan failure (JOSE, shock liver) requiring mechanical support with IABP, now s/p HM3 LVAD 7/8/22 by Dr. Zamora with intraoperative course c/b RV failure s/p 29F Protek duo via RIJ. Post op course c/b hemopericardium s/p repeat washout with chest left open. Chest closed 7/13/22 and decannulated from RVAD 7/21\"   Notable History/Conditions Cardiac;Congestive heart failure;Recent surgery;Hypertension   Assessment Immediate Code Blue called for compromised airway d/t excessive nasal bleeding. See Code Blue record.   Interventions IV fluids;Labs;Meds;O2 per N/C or mask   Adjustments to Recommend Intubated and transferred to 4E   Patient Outcome   Patient Outcome Transferred to  (4E.)   RRT Team   Attending/Primary/Covering Physician Code Team at bedside.  See Code Blue record.   Date Attending Physician notified 08/07/22   Time Attending Physician notified 0844   Physician(s) Dr. Paula Montoya   Lead RN Alisa Jade   RT Woody   Notifications   Notified family;ICU/NICU;primary care provider   Onset   Witnessed yes   Location of Code/Rapid Response 6B, 6-238, bed 2   Event Type respiratory   Rhythm other (see comment)  (First degree AV block.)   Pulse Present at Onset yes   Respirations " Present at Onset spontaneous   Ventilated Pre-Arrest no   Conscious at Onset yes   Interventions   Pulse Present? yes   Code Blue/RRT Outcome   Type of Event Rapid Response to Code Blue   Event Outcome survived   Cardiac Strips Printed? not applicable   Therapeutic Hypothermia:  Code Freeze not indicated

## 2022-08-07 NOTE — PROGRESS NOTES
Today's Events: Debbie Montoya NP paged to bedside to evaluate pt after significant nose bleed overnight. ENT came overnight and placed packing to stop the bleeding. The left side of packing has started to come out slightly and patient is complaining of SOB. RN concerned about pt being able to maintain his airway d/t bleeding. Previous septum perforation. Lung sounds are course throughout all fields.  Cards 2 and ENT also at bedside.   Team decided intubation was necessary and pt was transferred to  for higher level of care.    Mental/Neuro: Pt is disoriented to time, situation, and place.       Vitals: Temp- 98.6, Pulse-112, BP-97/70, MAP- 95 (doppler). RR- 24      O2:100% RA. Placed on oxymask for comfort. Pt is experiencing SOB.       Labs: hemoglobin 6.4- reported to SAJAN Montoya at time of page.       Lines/Drips/Drains/Major Wounds: L and R PIV. Vascular at bedside to place a third line.   Norepinephrine- 0.2mcg/kg/min  2 units of Blood infusing VIA massive transfuser    NS running open

## 2022-08-07 NOTE — PROGRESS NOTES
"    BRIEF CVTS PROGRESS NOTE    S:  Received signout from night float intern that pt had significant epistaxis overnight.  Nares packed per ENT.  Subsequent page from RN regarding critical hemoglobin and concern for ongoing ability to protect airway.  ENT paged by nursing staff and per report, ENT would need to come from another facility after completion of an in-process procedure.    O:  Cuff pressure 90/70s with tachycardia  Hgb 6.4  INR 2.72    Gen:  Pale appearing  HEENT:  dried blood around nose and mouth, saturate rhino rockets in place  Neuro:  Eyes partially closed, responds to voice but nonsensical speech \"it's a suicide mission, isn't it?\" Intact gag reflex  CV:  Functioning LVAD with speed at 5300 rppm, flow and PIs ~mid-3  Resp: upper airway rales/rhonci, blood in the oropharnyx, oxymask in place, labored breathing    A/P:    Deep oral suctioning was performed and some bloody secretions were able to be cleared from the airway using a catheter suction device.  The gag reflex was triggered and this caused Mr. Sands to be more awake and unfortunately less tolerant of deep suctioning.  On subsequent attempts at passing the suction catheter, Mr. Sands was noted to be biting the catheter and thus it could not be advanced to the level required to effectively clear the airway.    Given unclear timeline to ENT evaluation and intervention and progressive respiratory distress and depressed mental status, I felt it prudent to begin resuscitative measures in order to stabilize the patient.     - Two units of unmatched PRBC were ordered and infused via rapid infuser, crystalloids were hung while awaiting blood arrival   - An additional IV was placed   - A rapid response was initially called and this was upgraded to a code blue for the purpose of airway securement upon learning that ENT was not immediately available.   - Labs were ordered:  ABG, CBC, Coags   - An order was placed to prepare an additional 4 units of " PRBC to have on standby    Shortly after the patient was intubated, a provider from ENT arrived.  Please see their note for full details of their intervention.    At no point did the patient become hypoxic or apneic; he never lost a pulse, he had no observed LVAD alarms.    The [facility] 2 team responded to the overhead code; the case was discussed and by mutual agreement we will transfer ongoing care to them primarily.    The patient's emergency contact, his son Amos, was contacted by phone to update him on all that had happened and the transfer of Mr. Sands to  for ongoing care.    Please account for 30 minutes of critical care time, excluding separately billable procedures.    Dr. Veras updated verbally.    Debbie Montoya, CNP, ACN-AG  Cardiothoracic Surgery  Pager [phone]

## 2022-08-07 NOTE — PROVIDER NOTIFICATION
Notified CVTS cross cover for excessive bilateral nos bleeding L.> R . Pressure applied with no effect . Plan to have ENT come and assess patient at bedside .

## 2022-08-07 NOTE — CODE/RAPID RESPONSE
Rapid Response Team Note    Assessment   In assessment a rapid response was called on Dandy Sands due to significant epistaxis . This presentation is likely due to recurrent epistaxis on warfarin that is being followed and managed by ENT.    Plan   -  ENT is already in room managing. Per ENT it looked anterior bleeding related to the septal peroration. For now, hematoma has been cleared and felt to have no more active bleeding. Follow ENT recommendations to minimize more episodes.   - STAT CBC, Coagulation panel and blood type/screen ordered- replace as needed  - Close clinical monitoring, fluid management per LVAD protocol  -  Disposition: The patient will remain on the current unit. We will continue to monitor this patient closely.  -  Reassessment and plan follow-up will be performed by the primary team      Brittaney Lopez PA-C  [facility] RRT [facility] Job Code Contact #[phone]  [facility] Paging/Directory    Hospital Course   Brief Summary of events leading to rapid response:   Dandy Sands is a 61 year old male with PMH of lupus, antiphospholipid syndrome, HTN, HLD, HFrEF 2/2 ICM who presented with cardiogenic shock c/b multiorgan failure (JOSE, shock liver) requiring mechanical support with IABP, now s/p HM3 LVAD 22 by Dr. Zamora with intraoperative course c/b RV failure s/p 29F Protek duo via RIJ. Post op course c/b hemopericardium s/p repeat washout with chest left open. Chest closed 22 and decannulated from RVAD   RRT called due to significant epistaxis that was not controlled with basic interventions and silver nitrate.      Admission Diagnosis:   Cardiogenic shock (H) [R57.0]    Physical Exam   Temp: 97  F (36.1  C) Temp  Min: 97  F (36.1  C)  Max: 97.6  F (36.4  C)  Resp: 16 Resp  Min: 16  Max: 20  SpO2: 93 % SpO2  Min: 93 %  Max: 97 %  Pulse: 110 Pulse  Min: 97  Max: 111    No data recorded  BP: 108/86 Systolic (24hrs), Av , Min:99 , Max:127   Diastolic (24hrs), Av, Min:82,  Max:98     I/Os: I/O last 3 completed shifts:  In: 2380 [P.O.:710; NG/GT:470]  Out: 1425 [Urine:1425]     Exam:   Not done due to ongoing interventions by ENT and ongoing bleeding. Grossly pt is fully oriented and communicating.     Significant Results and Procedures   Lactic Acid:   Recent Labs   Lab Test 08/06/22  0444 08/05/22  0440 08/04/22  0056   LACT 1.1 1.1 1.5     CBC:   Recent Labs   Lab Test 08/06/22  1939 08/06/22  1210 08/06/22  0444 08/05/22  1036 08/05/22  0440 08/04/22  1202 08/04/22  0745   WBC  --   --  8.4  --  10.1  --  10.5   HGB 8.6* 8.3* 8.7*  8.7*   < > 9.1*  9.1*   < > 9.1*   HCT  --   --  28.2*  --  28.8*  --  28.9*   PLT  --   --  390  --  385  --  370    < > = values in this interval not displayed.

## 2022-08-07 NOTE — CODE DOCUMENTATION
Code blue called earlier today due to concerns of protecting airway. On arrival pt was awake and able to talk. He was saturating at 100%, initially on 2-3 L oxymask and then 10L oxymask. No LVAD alarms. Cuff MAPs and Doppler MAPs consistently > 65mmHg. No concerns for loss of perfusion. He had epistaxis overnight and his packing was soaked. He was unable to cough up the blood that was likely going down his posterior oropharynx from his nasopharynx. Due to concerns of airway protection caused by epistaxis that could not be immediately controlled, pt was intubated by Anesthesia at bedside. Massive transfusion protocol was also initiated due to Hb dropping from 8.5 at 2:30 AM to 6.4 at 8AM. He was transferred to the ICU after intubation. ENT did cauterization and packing. Subsequently R fem arterial line was placed (R radial had clot in it and later found to have clot in L radial artery as well). Initially attempted L internal jugular MAC sheath insertion but although access was achieved successfully and wire was advanced a certain distance into the vessel (confirmed on ultrasound) it would not advance completely despite manipulation. Took out wire and needle and was starting a 2nd access attempt when noted that pt started to develop swelling in the left neck likely from subcutaneous oozing from venous puncture site in setting of being on warfarin for LVAD. Aborted L internal jugular Mac sheath insertion. Since he recently had a Protek in the R internal jugular this was not attempted (likely had a clot there). A R groin Mac sheath was placed by Dr Stewart.    Chris Lawrence MD  Cardiology Fellow

## 2022-08-07 NOTE — PROGRESS NOTES
"General surgery brief progress note    Dandy Sands is a 61 year old male with PMH of lupus, antiphospholipid syndrome, HTN, HLD, HFrEF 2/2 ICM who presented with cardiogenic shock c/b multiorgan failure (JOSE, shock liver) requiring mechanical support with IABP, now s/p HM3 LVAD 7/8/22 by Dr. Zamora with intraoperative course c/b RV failure s/p 29F Protek duo via RIJ. Post op course c/b hemopericardium s/p repeat washout with chest left open. Chest closed 7/13/22 and decannulated from RVAD 7/21    Evaluated patient at bedside for nosebleed.  This has been happening a few times since her hospital stay, previously he was evaluated by ENT for recurrent epitaxis which required procedure to stop the nosebleed.  He denies lightheadedness dizziness fainting pain difficulty with speech.  He has baseline dysarthria.  No nausea vomiting. He is on warfarin, last INR 2.8.  On exam bilateral nostrils are bleeding through norms operative packing.  Patient is cooperative AOx3 slightly confused and slow to response at times.    BP 99/86 (BP Location: Right arm, Cuff Size: Adult Small)   Pulse 105   Temp 97  F (36.1  C) (Oral)   Resp 18   Ht 1.702 m (5' 7\")   Wt 65 kg (143 lb 4.8 oz)   SpO2 93%   BMI 22.44 kg/m      Recent Results (from the past 24 hour(s))   Glucose by meter    Collection Time: 08/06/22  3:43 AM   Result Value Ref Range    GLUCOSE BY METER POCT 127 (H) 70 - 99 mg/dL   CBC with platelets    Collection Time: 08/06/22  4:44 AM   Result Value Ref Range    WBC Count 8.4 4.0 - 11.0 10e3/uL    RBC Count 3.02 (L) 4.40 - 5.90 10e6/uL    Hemoglobin 8.7 (L) 13.3 - 17.7 g/dL    Hematocrit 28.2 (L) 40.0 - 53.0 %    MCV 93 78 - 100 fL    MCH 28.8 26.5 - 33.0 pg    MCHC 30.9 (L) 31.5 - 36.5 g/dL    RDW 16.9 (H) 10.0 - 15.0 %    Platelet Count 390 150 - 450 10e3/uL   Lactic acid whole blood    Collection Time: 08/06/22  4:44 AM   Result Value Ref Range    Lactic Acid 1.1 0.7 - 2.0 mmol/L   Comprehensive metabolic panel    " Collection Time: 08/06/22  4:44 AM   Result Value Ref Range    Sodium 131 (L) 136 - 145 mmol/L    Potassium 3.8 3.4 - 5.3 mmol/L    Creatinine 0.72 0.67 - 1.17 mg/dL    Urea Nitrogen 28.1 (H) 8.0 - 23.0 mg/dL    Chloride 93 (L) 98 - 107 mmol/L    Carbon Dioxide (CO2) 29 22 - 29 mmol/L    Anion Gap 9 7 - 15 mmol/L    Glucose 129 (H) 70 - 99 mg/dL    Calcium 8.5 (L) 8.8 - 10.2 mg/dL    Protein Total 6.8 6.4 - 8.3 g/dL    Albumin 3.0 (L) 3.5 - 5.2 g/dL    Bilirubin Total 0.4 <=1.2 mg/dL    Alkaline Phosphatase 203 (H) 40 - 129 U/L    AST 34 10 - 50 U/L    ALT 19 10 - 50 U/L    GFR Estimate >90 >60 mL/min/1.73m2   Magnesium    Collection Time: 08/06/22  4:44 AM   Result Value Ref Range    Magnesium 1.8 1.7 - 2.3 mg/dL   Phosphorus    Collection Time: 08/06/22  4:44 AM   Result Value Ref Range    Phosphorus 4.7 (H) 2.5 - 4.5 mg/dL   Ionized Calcium    Collection Time: 08/06/22  4:44 AM   Result Value Ref Range    Calcium Ionized 4.4 4.4 - 5.2 mg/dL   INR    Collection Time: 08/06/22  4:44 AM   Result Value Ref Range    INR 2.81 (H) 0.85 - 1.15   Hemoglobin    Collection Time: 08/06/22  4:44 AM   Result Value Ref Range    Hemoglobin 8.7 (L) 13.3 - 17.7 g/dL   Glucose by meter    Collection Time: 08/06/22  7:57 AM   Result Value Ref Range    GLUCOSE BY METER POCT 153 (H) 70 - 99 mg/dL   Glucose by meter    Collection Time: 08/06/22 11:16 AM   Result Value Ref Range    GLUCOSE BY METER POCT 120 (H) 70 - 99 mg/dL   Hemoglobin    Collection Time: 08/06/22 12:10 PM   Result Value Ref Range    Hemoglobin 8.3 (L) 13.3 - 17.7 g/dL   Glucose by meter    Collection Time: 08/06/22  3:28 PM   Result Value Ref Range    GLUCOSE BY METER POCT 125 (H) 70 - 99 mg/dL   Hemoglobin    Collection Time: 08/06/22  7:39 PM   Result Value Ref Range    Hemoglobin 8.6 (L) 13.3 - 17.7 g/dL   Glucose by meter    Collection Time: 08/06/22  7:49 PM   Result Value Ref Range    GLUCOSE BY METER POCT 144 (H) 70 - 99 mg/dL   Glucose by meter    Collection  Time: 08/06/22 11:39 PM   Result Value Ref Range    GLUCOSE BY METER POCT 146 (H) 70 - 99 mg/dL       Discussed plans with ENT on-call resident.  We will have ENT come evaluate and proceed with any  procedure necessary to stop the nosebleed.  We will continue to monitor his hemoglobin.    Yon Sullivan MD  PGY-1 General Surgery

## 2022-08-07 NOTE — PROGRESS NOTES
[facility]   Cards 2 - Progress Note    Dandy Sands MRN: [id number]  Age: 61 year old, : 1960  Date: 2022      Assessment and Plan:  Dandy Sands is a 61 year old male with PMH of lupus, antiphospholipid syndrome, HTN, HLD, HFrEF 2/2 ICM who presented with cardiogenic shock c/b multiorgan failure (JOSE, shock liver) requiring mechanical support with IABP, now s/p HM3 LVAD 22 by Dr. Zamora with intraoperative course c/b RV failure s/p 29F Protek duo via RIJ. Post op course c/b hemopericardium s/p repeat washout with chest left open. Chest closed 22 and decannulated from RVAD . Developed epistaxis on  night which was packed by ENT. Unfortunately bleeding persisted and there was concern for airway protection on  AM which prompted intubation and transfer to ICU.    Neurology  #Insomnia/Delirium  Sedated on fentanyl and versed. Some concern that he had a fall overnight. Discussed with nursing staff and he did NOT have a fall, although he did try to get out of bed. CT head was obtained () and it was unremarkable.  -Attempt to wean sedation as able  -Will likely need Precedex to facilitate extubation  -Continue Olanzapine, Sertraline and Quetiapine per previous Psych recommendations    Cardiovascular  #HFrEF 2/2 ICM s/p HM3 LVAD in setting of cardiogenic shock (SCAI D)  #RV failure s/p Protek duo temporary RVAD s/p decannulation   #RV thrombus  #Post chest closure hemopericardium s/p repeat washout ()  #PMH CAD s/p PCI to LAD ()  #S/p CRT-D ()  Patient with ICM s/p HM3 LVAD 22 by Dr. Zamora in setting of presentation for cardiogenic shock requiring MCS with IABP as prior to HM (initially evaluated for heart transplant, however noted to have substantially elevated DSAs during course of care, so decision made to proceed with LVAD given patient already on temporary MCS). Intraoperative course c/b RV failure resulting in cardiogenic  shock requiring high dose pressors necessitating RVAD support. Initial post-op course c/b hemopericardium requiring repeat washout with chest left open, however has since recovered well with decannulation on 7/21 with extubation day prior. Has continued to tolerate well from hemodynamic and end organ standpoint.   -LVAD: continues to have good flows, no alarms and stable end organ perfusion; continue speed at 5300  -continue digoxin for RV support  -AC, antiplatelets: ASA/warfarin on hold due to epistaxis  -Volume status: Holding bumex 3mg PO BID. Given low native pulsatility on arterial line and bleeding will give fluids (D5 at 100cc/hour)  -rhythm: sinus              -of note, patient with climbing capture threshold of RV lead; will need to be evaluated in future by EP team  -blood pressure: Hold captopril 12.5mg TID. Briefly was on NE post intubation but was soon weaned off    Pulmonary  #Epistaxis  #Intubated due to Concern for airway protection (8/7)  #Mild-moderate emphysema  #History of COVID-19  #Iatrogenic R apical PTX  H/o nasal perforation that required elective procedure (in [place]) which was postponed due to LVAD insertion. Developed epistaxis not fixed by packing overnight on 8/6 and pt continued to bleed. Intubated due to concern for ability to maintain airway from bleeding.  Vent Mode: CMV/AC  (Continuous Mandatory Ventilation/ Assist Control)  Resp Rate (Set): 12 breaths/min  Tidal Volume (Set, mL): 550 mL  PEEP (cm H2O): 5 cmH2O  Resp: 9  -Wean vent as able  -Appreciate ENT assistance in managing epistaxis emergently. Will discuss long-term plan for septal perforation  -Cefepime/Vanc empirically for broad coverage for PNA  -Continue Breo-Ellipta    ENT:  #Epistaxis  #Dysphonia  Controlled at bedside by ENT s/p cautery and packing. Appreciate assistance. Will need to discuss long-term plan regarding septal perforation  -Management per ENT    Gastrointestinal, Nutrition  #GERD  #Congestive  hepatopathy in the setting of cardiac insuffiencey - Resolved  #Hematemesis / oral mucosal bleeding -resolved    #Dysphagia  GI consulted 7/31 for black tarry stools and a possible GI bleed, however it is more likely swallowed blood from epistaxis that patient previously had. GI does not recommend an upper endoscopy at this time. Recurrence of bloody stools likely due to epistaxis. Will monitor Hb.  -Hold TF for now  -Protonix 80mg IV x 1 followed by 40mg BID    Renal, Electrolytes  #Hyponatremia  #Hyperkalemia  Repeat K check  - Strict I/O, daily weights   - Avoid/limit nephrotoxins as able  - Replete lytes PRN per protocol    Infectious Disease  #E.faecalis on culture from PICC line (8/2/22) - 1/2 tubes in 1/4 sets collected 8/2  #S.epidermidis (MSSE) on culture from PICC line (8/2/22) - 1/2 tubes in 1/4 sets collected 8/2  Vanc was supposed to end on 8/7 but given massive epistaxis will start broad spectrum antibiotics  -Cefepime/Vanc as above (since 8/7)    Hematology  #Anemia due to blood loss  #History of DVT and PE   #Antiphospholipid antibody syndrome  #History of iron deficiency anemia  #Epistaxis  Transfused during the day.   -Hemoglobin check q6H  -Transfuse to keep Hb>7  -Warfarin and ASA on hold. Given Vit K 2.5mg IV due to bleeding    Endocrinology  #Hypoglycemia  D5 at 100cc/hour    Rheumatology:  #SLE  - PTA meds: hydroxychloroquine 200mg, resumed 7/16  - Will need cellcept restarted when appropriate    Pertinent Lines  R Fem arterial line  8/7  R Fem Mac Sheath 8/7  OG tube 8/7  ETT 8/7    ICU Cares:  Feeds: On hold given epistaxis  DVT Prophylaxis: On hold given epistaxis  GI Prophylaxis: PPT  Bowel Regimen: On hold given epistaxis and bloody stools    Patient seen and discussed with Dr. Stewart.  Assessment and plan as above.    Chris Lawrence MD  Cardiology Fellow      Subjective:  Please see earlier code documentation.    Objective Findings:  Temp:  [96.8  F (36  C)-100.9  F (38.3  C)] 98.6  F (37   C)  Pulse:  [] 80  Resp:  [12-24] 12  BP: ()/(57-98) 119/98  MAP:  [75 mmHg-113 mmHg] 79 mmHg  Arterial Line BP: ()/() 85/59  SpO2:  [88 %-100 %] 100 %    Physical Exam:  GEN: Frail  HEENT: Nasal Packing present  Pulm: Coarse breath sounds bilaterally  Cardiac: LVAD hum +  GI: soft, non distended  Neuro: Sedated  Vascular: No lower extremity edema    Medications:    acetaminophen  975 mg Oral or Feeding Tube Q8H CAMMY     aspirin  81 mg Oral or Feeding Tube Daily     [Held by provider] atorvastatin  40 mg Oral QPM     [Held by provider] bumetanide  3 mg Oral BID     [Held by provider] captopril  12.5 mg Oral TID     ceFEPIme (MAXIPIME) IV  2 g Intravenous Q8H     digoxin  125 mcg Oral Daily     fiber modular (NUTRISOURCE FIBER)  1 packet Per Feeding Tube TID     fluticasone-vilanterol  1 puff Inhalation Daily     hydroxychloroquine  200 mg Oral BID     insulin aspart  1-12 Units Subcutaneous Q4H     lidocaine  1 patch Transdermal Q24h    And     lidocaine   Transdermal Q8H CAMMY     lidocaine 3%, phenylephrine 0.25% solution for irrigation  5 mL Irrigation Once     melatonin  10 mg Oral At Bedtime     multivitamins w/minerals  15 mL Per Feeding Tube Daily     nystatin  1,000,000 Units Swish & Swallow 4x Daily     OLANZapine  5 mg Oral BID     oxidized cellulose   Topical Once     oxymetazoline  2 spray Both Nostrils BID     pantoprazole (PROTONIX) IV  40 mg Intravenous QAM AC     protein modular  1 packet Per Feeding Tube TID     QUEtiapine  100 mg Oral or Feeding Tube At Bedtime     QUEtiapine  25 mg Oral BID     sertraline  50 mg Oral Daily     silver nitrate   Topical Once     sodium chloride  2 spray Both Nostrils Q4H     tranexamic acid   Topical Once     vancomycin  750 mg Intravenous Q12H     zolpidem  5 mg Oral At Bedtime       dextrose       D5W 100 mL/hr at 08/07/22 1700     fentaNYL 50 mcg/hr (08/07/22 1700)     midazolam 2 mg/hr (08/07/22 1700)     norepinephrine Stopped (08/07/22  1100)       Labs:   [facility]  Recent Labs   Lab 08/07/22  1614 08/07/22  1336 08/07/22  1013 08/07/22  0957 08/07/22  0307 08/07/22  0231 08/06/22  0757 08/06/22  0444 08/05/22  0759 08/05/22  0440 08/04/22  1131 08/04/22  0745   NA  --   --   --  133*  --  133*  --  131*  --  134*  --  128*   POTASSIUM  --   --   --  5.6*  --  4.6  --  3.8  --  3.8  --  3.9   CHLORIDE  --   --   --  99  --  95*  --  93*  --  95*  --  92*   CO2  --   --   --  25  --  28  --  29  --  28  --  26   ANIONGAP  --   --   --  9  --  10  --  9  --  11  --  10   GLC 98 100* 111* 118*   < > 115*   < > 129*   < > 129*   < > 164*   BUN  --   --   --  31.5*  --  26.3*  --  28.1*  --  26.6*  --  24.7*   CR  --   --   --  0.67  --  0.67  --  0.72  --  0.73  --  0.75   GFRESTIMATED  --   --   --  >90  --  >90  --  >90  --  >90  --  >90   ELDA  --   --   --  8.1*  --  8.6*  --  8.5*  --  8.6*  --  8.7*   MAG  --   --   --   --   --  1.8  --  1.8  --  2.1  --  2.4*   PHOS  --   --   --   --   --  4.2  --  4.7*  --  4.5  --  4.5   PROTTOTAL  --   --   --  6.1*  --  6.7  --  6.8  --  6.9  --  6.9   ALBUMIN  --   --   --  3.1*  --  3.1*  --  3.0*  --  3.0*  --  2.9*   BILITOTAL  --   --   --  0.6  --  0.4  --  0.4  --  0.4  --  0.5   ALKPHOS  --   --   --  166*  --  214*  --  203*  --  211*  --  216*   AST  --   --   --  51*  --  31  --  34  --  34  --  32   ALT  --   --   --  21  --  20  --  19  --  16  --  14    < > = values in this interval not displayed.     CBC  Recent Labs   Lab 08/07/22  1327 08/07/22  0957 08/07/22  0754 08/07/22  0231 08/06/22  1210 08/06/22  0444 08/05/22  1036 08/05/22  0440   WBC  --  17.0*  --  12.1*  --  8.4  --  10.1   RBC  --  2.92*  --  2.95*  --  3.02*  --  3.14*   HGB 9.1* 8.4* 6.4* 8.5*   < > 8.7*  8.7*   < > 9.1*  9.1*   HCT  --  26.6*  --  27.4*  --  28.2*  --  28.8*   MCV  --  91  --  93  --  93  --  92   MCH  --  28.8  --  28.8  --  28.8  --  29.0   MCHC  --  31.6  --  31.0*  --  30.9*  --  31.6   RDW  --  16.2*  --   16.6*  --  16.9*  --  16.9*   PLT  --  314  --  479*  --  390  --  385    < > = values in this interval not displayed.     Arterial Blood Gas  Recent Labs   Lab 08/07/22  1328 08/07/22  1016 08/07/22  0957 08/01/22  2102 08/01/22  1719   PH 7.43  --  7.32* 7.52* 7.52*   PCO2 42 54* 50* 37 38   PO2 271*  --  247* 61* 44*   HCO3 28 28 26 30* 31*   O2PER 80  --  80 21 21

## 2022-08-07 NOTE — PROVIDER NOTIFICATION
Notified CVTS team about patient increased confusion/aggresiveness . Patient was sleeping and woke up and stood up ., triggered bed alarm . Did not fall . Wanted to leave and go home . Patient needed re-oriention  .After he spoke to his son . Patient was calmer and cooperative with staff . CVTS Cross cover came to assess patient at bedside . Prn and scheduled antianxiety medication given . Will monitor closely .

## 2022-08-07 NOTE — PROCEDURES
[facility]    Insert arterial line    Date/Time: 8/7/2022 5:25 PM  Performed by: Chris Lawrence MD  Authorized by: Chris Lawrence MD       UNIVERSAL PROTOCOL   Site Marked: Yes  Prior Images Obtained and Reviewed:  Yes  Required items: Required blood products, implants, devices and special equipment available    Patient identity confirmed:  Arm band  NA - No sedation, light sedation, or local anesthesia  Confirmation Checklist:  Procedure was appropriate and matched the consent or emergent situation  Universal Protocol: the Joint Commission Universal Protocol was followed    Preparation: Patient was prepped and draped in usual sterile fashion    Indication: respiratory failure hemodynamic monitoring  Location: right femoral       ANESTHESIA    Local Anesthetic: Lidocaine 1% with epinephrine      SEDATION  Patient Sedated: Yes    Vital signs: Vital signs monitored during sedation      PROCEDURE DETAILS          Number of Attempts:  1  Post-procedure:  Line sutured      PROCEDURE    Length of time physician/provider present for 1:1 monitoring during sedation: 0      Chris Lawrence MD  Cardiology Fellow

## 2022-08-07 NOTE — PROGRESS NOTES
Sepsis Evaluation Progress Note    I was called to see Dandy Sands due to significant nose bleed causing airway compromise.     Data   Lactic Acid   Date Value Ref Range Status   08/07/2022 1.6 0.7 - 2.0 mmol/L Final   08/06/2022 1.1 0.7 - 2.0 mmol/L Final       Assessment & Plan   NO EVIDENCE OF SEPSIS at this time.  Vital sign, physical exam, and lab findings are due to nose bleed.     Dandy Sands is a 61 year old male with a past medical history of lupus, antiphospholipid syndrome, hypertension, hyperlipidemia, HFrEF secondary to ICM who presented with cardiogenic shock complicated by multiorgan failure (JOSE, shock liver) requiring mechanical support with IABP, now s/p HM3 LVAD 7/8/22 by Dr. Zamora with intraoperative course complicated by RV failure s/p 29F Protek duo via RIJ who had hemopericardium s/p repeat washout s/p chest closure (7/13/22) and decannulated from RVAD (7/21/22). RRT was called this afternoon a second time for significant epistaxis causing airway compromise. Shortly after RRT was called, CODE BLUE was called. Anesthesia presented and intubated the patient. He is being transfused as hemoglobin dropped from 8.5g/dL to 6.4g/dL.       -Anesthesia is intubating patient and then he will transfer to   -Transfuse 2U PRBC, uncrossmatched    Disposition: The patient will be transferred to the ICU..  RUI Herzog    Sepsis Criteria   Sepsis: 2+ SIRS criteria due to infection  Severe Sepsis: Sepsis AND 1+ new sign of acute organ dysfunction (Note: lactate >2 or acute encephalopathy each qualify as organ dysfunction)  Septic Shock: Sepsis AND hypotension despite volume resuscitation with 30 ml/kg crystalloid or lactate >=4  Note: HYPOTENSION is defined as 2 BP readings measured 3 hrs apart that have a SBP <90, MAP <65, or decrease >40 mmHg, occurring 6 hrs before or after t-zero

## 2022-08-07 NOTE — PLAN OF CARE
Major Shift Events:  Confused , restless . Impulsive ,did not sleep during the night for excessive nose bleeding , please MD notification . ENT came and packed both noses . But continued to have bleeding via the nasal packing . NJ tube removed . Pt has nasal septal perforation .  RRT Called for coughing up large blood clots and risk of airway . Hemoglobin at 0300 was 8.5 . INR 3.04 . Had 2 units of FFP . Map > 65 . LVAD functioning with no alarms . LVAD parameters WNL . Breath sounds coarse . No BM during the shift .   Plan: Continue 1:1 supervision for safety . Plan to recheck hemoglobin and INR at 0800.   Continue to monitor patient's condition closely .   For vital signs and complete assessments, please see documentation flowsheets.      Problem: Risk for Delirium  Goal: Optimal Coping  Outcome: Unable to Meet, Plan Revised  Intervention: Optimize Psychosocial Adjustment to Delirium  Recent Flowsheet Documentation  Taken 8/7/2022 0400 by Ricky Davenport RN  Family/Support System Care:   involvement promoted   presence promoted  Taken 8/7/2022 0000 by Ricky Davenport RN    Problem: Cardiac Output Decreased  Goal: Effective Cardiac Output  Outcome: Ongoing, Progressing  Intervention: Optimize Cardiac Output  Recent Flowsheet Documentation  Taken 8/7/2022 0400 by Ricky Davenport RN      Problem: Infection (Ventricular Assist Device)  Goal: Absence of Infection Signs and Symptoms  Outcome: Ongoing, Progressing  Intervention: Prevent or Manage Infection  Recent Flowsheet Documentation  Taken 8/7/2022 0400 by Ricky Davenport RN  Infection Prevention:   rest/sleep promoted   hand hygiene promoted   environmental surveillance performed      Problem: Cardiac Output Decreased (Heart Failure)  Goal: Optimal Cardiac Output  Outcome: Ongoing, Progressing     Problem: Sleep Disordered Breathing (Heart Failure)  Goal: Effective Breathing Pattern During Sleep  Outcome: Unable to Meet, Plan Revised

## 2022-08-08 ENCOUNTER — APPOINTMENT (OUTPATIENT)
Dept: GENERAL RADIOLOGY | Facility: CLINIC | Age: 62
DRG: 001 | End: 2022-08-08
Attending: INTERNAL MEDICINE
Payer: COMMERCIAL

## 2022-08-08 LAB
ALBUMIN SERPL BCG-MCNC: 2.7 G/DL (ref 3.5–5.2)
ALP SERPL-CCNC: 144 U/L (ref 40–129)
ALT SERPL W P-5'-P-CCNC: 25 U/L (ref 10–50)
ANION GAP SERPL CALCULATED.3IONS-SCNC: 7 MMOL/L (ref 7–15)
AST SERPL W P-5'-P-CCNC: 42 U/L (ref 10–50)
ATRIAL RATE - MUSE: 117 BPM
BACTERIA BLD CULT: NO GROWTH
BACTERIA BLD CULT: NO GROWTH
BASE EXCESS BLDA CALC-SCNC: 1.3 MMOL/L (ref -9–1.8)
BASE EXCESS BLDA CALC-SCNC: 2.3 MMOL/L (ref -9–1.8)
BASE EXCESS BLDA CALC-SCNC: 2.9 MMOL/L (ref -9–1.8)
BASE EXCESS BLDA CALC-SCNC: 3.3 MMOL/L (ref -9–1.8)
BILIRUB SERPL-MCNC: 0.6 MG/DL
BUN SERPL-MCNC: 28 MG/DL (ref 8–23)
CA-I BLD-MCNC: 4.8 MG/DL (ref 4.4–5.2)
CALCIUM SERPL-MCNC: 8.8 MG/DL (ref 8.8–10.2)
CHLORIDE SERPL-SCNC: 100 MMOL/L (ref 98–107)
CREAT SERPL-MCNC: 0.71 MG/DL (ref 0.67–1.17)
DEPRECATED HCO3 PLAS-SCNC: 26 MMOL/L (ref 22–29)
DIASTOLIC BLOOD PRESSURE - MUSE: NORMAL MMHG
ERYTHROCYTE [DISTWIDTH] IN BLOOD BY AUTOMATED COUNT: 16.1 % (ref 10–15)
GFR SERPL CREATININE-BSD FRML MDRD: >90 ML/MIN/1.73M2
GLUCOSE BLDC GLUCOMTR-MCNC: 100 MG/DL (ref 70–99)
GLUCOSE BLDC GLUCOMTR-MCNC: 106 MG/DL (ref 70–99)
GLUCOSE BLDC GLUCOMTR-MCNC: 114 MG/DL (ref 70–99)
GLUCOSE BLDC GLUCOMTR-MCNC: 84 MG/DL (ref 70–99)
GLUCOSE BLDC GLUCOMTR-MCNC: 87 MG/DL (ref 70–99)
GLUCOSE BLDC GLUCOMTR-MCNC: 90 MG/DL (ref 70–99)
GLUCOSE BLDC GLUCOMTR-MCNC: 96 MG/DL (ref 70–99)
GLUCOSE SERPL-MCNC: 108 MG/DL (ref 70–99)
HCO3 BLD-SCNC: 26 MMOL/L (ref 21–28)
HCO3 BLD-SCNC: 26 MMOL/L (ref 21–28)
HCO3 BLD-SCNC: 27 MMOL/L (ref 21–28)
HCO3 BLD-SCNC: 28 MMOL/L (ref 21–28)
HCT VFR BLD AUTO: 28.2 % (ref 40–53)
HGB BLD-MCNC: 8.5 G/DL (ref 13.3–17.7)
HGB BLD-MCNC: 8.7 G/DL (ref 13.3–17.7)
HGB BLD-MCNC: 9 G/DL (ref 13.3–17.7)
HGB BLD-MCNC: 9.3 G/DL (ref 13.3–17.7)
INR PPP: 1.66 (ref 0.85–1.15)
INTERPRETATION ECG - MUSE: NORMAL
LACTATE SERPL-SCNC: 1 MMOL/L (ref 0.7–2)
MAGNESIUM SERPL-MCNC: 1.9 MG/DL (ref 1.7–2.3)
MCH RBC QN AUTO: 29.2 PG (ref 26.5–33)
MCHC RBC AUTO-ENTMCNC: 33 G/DL (ref 31.5–36.5)
MCV RBC AUTO: 88 FL (ref 78–100)
MDC_IDC_LEAD_IMPLANT_DT: NORMAL
MDC_IDC_LEAD_IMPLANT_DT: NORMAL
MDC_IDC_LEAD_LOCATION: NORMAL
MDC_IDC_LEAD_LOCATION: NORMAL
MDC_IDC_LEAD_LOCATION_DETAIL_1: NORMAL
MDC_IDC_LEAD_LOCATION_DETAIL_1: NORMAL
MDC_IDC_LEAD_MFG: NORMAL
MDC_IDC_LEAD_MFG: NORMAL
MDC_IDC_LEAD_MODEL: NORMAL
MDC_IDC_LEAD_MODEL: NORMAL
MDC_IDC_LEAD_POLARITY_TYPE: NORMAL
MDC_IDC_LEAD_POLARITY_TYPE: NORMAL
MDC_IDC_LEAD_SERIAL: NORMAL
MDC_IDC_LEAD_SERIAL: NORMAL
MDC_IDC_MSMT_BATTERY_DTM: NORMAL
MDC_IDC_MSMT_BATTERY_REMAINING_LONGEVITY: 63 MO
MDC_IDC_MSMT_BATTERY_RRT_TRIGGER: 2.73
MDC_IDC_MSMT_BATTERY_STATUS: NORMAL
MDC_IDC_MSMT_BATTERY_VOLTAGE: 2.94 V
MDC_IDC_MSMT_CAP_CHARGE_DTM: NORMAL
MDC_IDC_MSMT_CAP_CHARGE_ENERGY: 18 J
MDC_IDC_MSMT_CAP_CHARGE_TIME: 3.97
MDC_IDC_MSMT_CAP_CHARGE_TYPE: NORMAL
MDC_IDC_MSMT_LEADCHNL_RA_IMPEDANCE_VALUE: 285 OHM
MDC_IDC_MSMT_LEADCHNL_RA_PACING_THRESHOLD_AMPLITUDE: 0.75 V
MDC_IDC_MSMT_LEADCHNL_RA_PACING_THRESHOLD_PULSEWIDTH: 0.4 MS
MDC_IDC_MSMT_LEADCHNL_RA_SENSING_INTR_AMPL: 0.62 MV
MDC_IDC_MSMT_LEADCHNL_RV_IMPEDANCE_VALUE: 551 OHM
MDC_IDC_MSMT_LEADCHNL_RV_IMPEDANCE_VALUE: 665 OHM
MDC_IDC_MSMT_LEADCHNL_RV_PACING_THRESHOLD_AMPLITUDE: 2.75 V
MDC_IDC_MSMT_LEADCHNL_RV_PACING_THRESHOLD_PULSEWIDTH: 1 MS
MDC_IDC_MSMT_LEADCHNL_RV_SENSING_INTR_AMPL: 0.75 MV
MDC_IDC_PG_IMPLANT_DTM: NORMAL
MDC_IDC_PG_MFG: NORMAL
MDC_IDC_PG_MODEL: NORMAL
MDC_IDC_PG_SERIAL: NORMAL
MDC_IDC_PG_TYPE: NORMAL
MDC_IDC_SESS_CLINIC_NAME: NORMAL
MDC_IDC_SESS_DTM: NORMAL
MDC_IDC_SESS_TYPE: NORMAL
MDC_IDC_SET_BRADY_AT_MODE_SWITCH_RATE: 171 {BEATS}/MIN
MDC_IDC_SET_BRADY_HYSTRATE: NORMAL
MDC_IDC_SET_BRADY_LOWRATE: 50 {BEATS}/MIN
MDC_IDC_SET_BRADY_MAX_SENSOR_RATE: 115 {BEATS}/MIN
MDC_IDC_SET_BRADY_MAX_TRACKING_RATE: 130 {BEATS}/MIN
MDC_IDC_SET_BRADY_MODE: NORMAL
MDC_IDC_SET_BRADY_PAV_DELAY_LOW: 350 MS
MDC_IDC_SET_BRADY_SAV_DELAY_LOW: 350 MS
MDC_IDC_SET_LEADCHNL_RA_PACING_AMPLITUDE: 1.5 V
MDC_IDC_SET_LEADCHNL_RA_PACING_ANODE_ELECTRODE_1: NORMAL
MDC_IDC_SET_LEADCHNL_RA_PACING_ANODE_LOCATION_1: NORMAL
MDC_IDC_SET_LEADCHNL_RA_PACING_CAPTURE_MODE: NORMAL
MDC_IDC_SET_LEADCHNL_RA_PACING_CATHODE_ELECTRODE_1: NORMAL
MDC_IDC_SET_LEADCHNL_RA_PACING_CATHODE_LOCATION_1: NORMAL
MDC_IDC_SET_LEADCHNL_RA_PACING_POLARITY: NORMAL
MDC_IDC_SET_LEADCHNL_RA_PACING_PULSEWIDTH: 0.4 MS
MDC_IDC_SET_LEADCHNL_RA_SENSING_ANODE_ELECTRODE_1: NORMAL
MDC_IDC_SET_LEADCHNL_RA_SENSING_ANODE_LOCATION_1: NORMAL
MDC_IDC_SET_LEADCHNL_RA_SENSING_CATHODE_ELECTRODE_1: NORMAL
MDC_IDC_SET_LEADCHNL_RA_SENSING_CATHODE_LOCATION_1: NORMAL
MDC_IDC_SET_LEADCHNL_RA_SENSING_POLARITY: NORMAL
MDC_IDC_SET_LEADCHNL_RA_SENSING_SENSITIVITY: 0.3 MV
MDC_IDC_SET_LEADCHNL_RV_PACING_AMPLITUDE: 5 V
MDC_IDC_SET_LEADCHNL_RV_PACING_ANODE_ELECTRODE_1: NORMAL
MDC_IDC_SET_LEADCHNL_RV_PACING_ANODE_LOCATION_1: NORMAL
MDC_IDC_SET_LEADCHNL_RV_PACING_CAPTURE_MODE: NORMAL
MDC_IDC_SET_LEADCHNL_RV_PACING_CATHODE_ELECTRODE_1: NORMAL
MDC_IDC_SET_LEADCHNL_RV_PACING_CATHODE_LOCATION_1: NORMAL
MDC_IDC_SET_LEADCHNL_RV_PACING_POLARITY: NORMAL
MDC_IDC_SET_LEADCHNL_RV_PACING_PULSEWIDTH: 1 MS
MDC_IDC_SET_LEADCHNL_RV_SENSING_ANODE_ELECTRODE_1: NORMAL
MDC_IDC_SET_LEADCHNL_RV_SENSING_ANODE_LOCATION_1: NORMAL
MDC_IDC_SET_LEADCHNL_RV_SENSING_CATHODE_ELECTRODE_1: NORMAL
MDC_IDC_SET_LEADCHNL_RV_SENSING_CATHODE_LOCATION_1: NORMAL
MDC_IDC_SET_LEADCHNL_RV_SENSING_POLARITY: NORMAL
MDC_IDC_SET_LEADCHNL_RV_SENSING_SENSITIVITY: 0.3 MV
MDC_IDC_SET_ZONE_DETECTION_BEATS_DENOMINATOR: 40 {BEATS}
MDC_IDC_SET_ZONE_DETECTION_BEATS_NUMERATOR: 30 {BEATS}
MDC_IDC_SET_ZONE_DETECTION_INTERVAL: 280 MS
MDC_IDC_SET_ZONE_DETECTION_INTERVAL: 320 MS
MDC_IDC_SET_ZONE_DETECTION_INTERVAL: 350 MS
MDC_IDC_SET_ZONE_DETECTION_INTERVAL: 350 MS
MDC_IDC_SET_ZONE_DETECTION_INTERVAL: 400 MS
MDC_IDC_SET_ZONE_TYPE: NORMAL
MDC_IDC_STAT_AT_BURDEN_PERCENT: 0 %
MDC_IDC_STAT_AT_DTM_END: NORMAL
MDC_IDC_STAT_AT_DTM_START: NORMAL
MDC_IDC_STAT_BRADY_AP_VP_PERCENT: 0 %
MDC_IDC_STAT_BRADY_AP_VS_PERCENT: 0 %
MDC_IDC_STAT_BRADY_AS_VP_PERCENT: 0 %
MDC_IDC_STAT_BRADY_AS_VS_PERCENT: 100 %
MDC_IDC_STAT_BRADY_DTM_END: NORMAL
MDC_IDC_STAT_BRADY_DTM_START: NORMAL
MDC_IDC_STAT_BRADY_RA_PERCENT_PACED: 0 %
MDC_IDC_STAT_BRADY_RV_PERCENT_PACED: 0 %
MDC_IDC_STAT_EPISODE_RECENT_COUNT: 0
MDC_IDC_STAT_EPISODE_RECENT_COUNT_DTM_END: NORMAL
MDC_IDC_STAT_EPISODE_RECENT_COUNT_DTM_START: NORMAL
MDC_IDC_STAT_EPISODE_TOTAL_COUNT: 0
MDC_IDC_STAT_EPISODE_TOTAL_COUNT: 1
MDC_IDC_STAT_EPISODE_TOTAL_COUNT: 1
MDC_IDC_STAT_EPISODE_TOTAL_COUNT: 33
MDC_IDC_STAT_EPISODE_TOTAL_COUNT: 9
MDC_IDC_STAT_EPISODE_TOTAL_COUNT_DTM_END: NORMAL
MDC_IDC_STAT_EPISODE_TOTAL_COUNT_DTM_START: NORMAL
MDC_IDC_STAT_EPISODE_TYPE: NORMAL
MDC_IDC_STAT_TACHYTHERAPY_ATP_DELIVERED_RECENT: 0
MDC_IDC_STAT_TACHYTHERAPY_ATP_DELIVERED_TOTAL: 0
MDC_IDC_STAT_TACHYTHERAPY_RECENT_DTM_END: NORMAL
MDC_IDC_STAT_TACHYTHERAPY_RECENT_DTM_START: NORMAL
MDC_IDC_STAT_TACHYTHERAPY_SHOCKS_ABORTED_RECENT: 0
MDC_IDC_STAT_TACHYTHERAPY_SHOCKS_ABORTED_TOTAL: 0
MDC_IDC_STAT_TACHYTHERAPY_SHOCKS_DELIVERED_RECENT: 0
MDC_IDC_STAT_TACHYTHERAPY_SHOCKS_DELIVERED_TOTAL: 1
MDC_IDC_STAT_TACHYTHERAPY_TOTAL_DTM_END: NORMAL
MDC_IDC_STAT_TACHYTHERAPY_TOTAL_DTM_START: NORMAL
O2/TOTAL GAS SETTING VFR VENT: 2 %
O2/TOTAL GAS SETTING VFR VENT: 40 %
O2/TOTAL GAS SETTING VFR VENT: 40 %
O2/TOTAL GAS SETTING VFR VENT: 50 %
OXYHGB MFR BLD: 95 % (ref 92–100)
OXYHGB MFR BLD: 96 % (ref 92–100)
OXYHGB MFR BLD: 98 % (ref 92–100)
OXYHGB MFR BLD: 98 % (ref 92–100)
P AXIS - MUSE: 8 DEGREES
PCO2 BLD: 37 MM HG (ref 35–45)
PCO2 BLD: 37 MM HG (ref 35–45)
PCO2 BLD: 40 MM HG (ref 35–45)
PCO2 BLD: 41 MM HG (ref 35–45)
PH BLD: 7.42 [PH] (ref 7.35–7.45)
PH BLD: 7.44 [PH] (ref 7.35–7.45)
PH BLD: 7.46 [PH] (ref 7.35–7.45)
PH BLD: 7.47 [PH] (ref 7.35–7.45)
PHOSPHATE SERPL-MCNC: 3.5 MG/DL (ref 2.5–4.5)
PLATELET # BLD AUTO: 281 10E3/UL (ref 150–450)
PO2 BLD: 118 MM HG (ref 80–105)
PO2 BLD: 201 MM HG (ref 80–105)
PO2 BLD: 87 MM HG (ref 80–105)
PO2 BLD: 88 MM HG (ref 80–105)
POTASSIUM SERPL-SCNC: 4.3 MMOL/L (ref 3.4–5.3)
PR INTERVAL - MUSE: 144 MS
PROT SERPL-MCNC: 5.9 G/DL (ref 6.4–8.3)
QRS DURATION - MUSE: 154 MS
QT - MUSE: 380 MS
QTC - MUSE: 530 MS
R AXIS - MUSE: -63 DEGREES
RBC # BLD AUTO: 3.19 10E6/UL (ref 4.4–5.9)
SODIUM SERPL-SCNC: 133 MMOL/L (ref 136–145)
SYSTOLIC BLOOD PRESSURE - MUSE: NORMAL MMHG
T AXIS - MUSE: 117 DEGREES
VENTRICULAR RATE- MUSE: 117 BPM
WBC # BLD AUTO: 9.2 10E3/UL (ref 4–11)

## 2022-08-08 PROCEDURE — 99291 CRITICAL CARE FIRST HOUR: CPT | Mod: 25 | Performed by: INTERNAL MEDICINE

## 2022-08-08 PROCEDURE — 250N000013 HC RX MED GY IP 250 OP 250 PS 637: Performed by: SURGERY

## 2022-08-08 PROCEDURE — 250N000013 HC RX MED GY IP 250 OP 250 PS 637: Performed by: PHYSICIAN ASSISTANT

## 2022-08-08 PROCEDURE — C9113 INJ PANTOPRAZOLE SODIUM, VIA: HCPCS | Performed by: STUDENT IN AN ORGANIZED HEALTH CARE EDUCATION/TRAINING PROGRAM

## 2022-08-08 PROCEDURE — 258N000003 HC RX IP 258 OP 636: Performed by: STUDENT IN AN ORGANIZED HEALTH CARE EDUCATION/TRAINING PROGRAM

## 2022-08-08 PROCEDURE — 82330 ASSAY OF CALCIUM: CPT | Performed by: SURGERY

## 2022-08-08 PROCEDURE — 82805 BLOOD GASES W/O2 SATURATION: CPT

## 2022-08-08 PROCEDURE — P9045 ALBUMIN (HUMAN), 5%, 250 ML: HCPCS | Performed by: STUDENT IN AN ORGANIZED HEALTH CARE EDUCATION/TRAINING PROGRAM

## 2022-08-08 PROCEDURE — 250N000013 HC RX MED GY IP 250 OP 250 PS 637: Performed by: STUDENT IN AN ORGANIZED HEALTH CARE EDUCATION/TRAINING PROGRAM

## 2022-08-08 PROCEDURE — 999N000128 HC STATISTIC PERIPHERAL IV START W/O US GUIDANCE

## 2022-08-08 PROCEDURE — 250N000011 HC RX IP 250 OP 636: Performed by: THORACIC SURGERY (CARDIOTHORACIC VASCULAR SURGERY)

## 2022-08-08 PROCEDURE — 258N000003 HC RX IP 258 OP 636: Performed by: THORACIC SURGERY (CARDIOTHORACIC VASCULAR SURGERY)

## 2022-08-08 PROCEDURE — 250N000011 HC RX IP 250 OP 636: Performed by: STUDENT IN AN ORGANIZED HEALTH CARE EDUCATION/TRAINING PROGRAM

## 2022-08-08 PROCEDURE — 999N000157 HC STATISTIC RCP TIME EA 10 MIN

## 2022-08-08 PROCEDURE — 200N000002 HC R&B ICU UMMC

## 2022-08-08 PROCEDURE — 250N000009 HC RX 250: Performed by: STUDENT IN AN ORGANIZED HEALTH CARE EDUCATION/TRAINING PROGRAM

## 2022-08-08 PROCEDURE — 93451 RIGHT HEART CATH: CPT | Performed by: INTERNAL MEDICINE

## 2022-08-08 PROCEDURE — 71045 X-RAY EXAM CHEST 1 VIEW: CPT

## 2022-08-08 PROCEDURE — 85610 PROTHROMBIN TIME: CPT | Performed by: SURGERY

## 2022-08-08 PROCEDURE — 999N000015 HC STATISTIC ARTERIAL MONITORING DAILY

## 2022-08-08 PROCEDURE — 94003 VENT MGMT INPAT SUBQ DAY: CPT

## 2022-08-08 PROCEDURE — 83735 ASSAY OF MAGNESIUM: CPT | Performed by: SURGERY

## 2022-08-08 PROCEDURE — 80053 COMPREHEN METABOLIC PANEL: CPT | Performed by: SURGERY

## 2022-08-08 PROCEDURE — 85018 HEMOGLOBIN: CPT | Performed by: PHYSICIAN ASSISTANT

## 2022-08-08 PROCEDURE — 250N000013 HC RX MED GY IP 250 OP 250 PS 637: Performed by: THORACIC SURGERY (CARDIOTHORACIC VASCULAR SURGERY)

## 2022-08-08 PROCEDURE — 85027 COMPLETE CBC AUTOMATED: CPT | Performed by: SURGERY

## 2022-08-08 PROCEDURE — 83605 ASSAY OF LACTIC ACID: CPT | Performed by: SURGERY

## 2022-08-08 PROCEDURE — 92950 HEART/LUNG RESUSCITATION CPR: CPT | Performed by: INTERNAL MEDICINE

## 2022-08-08 PROCEDURE — 71045 X-RAY EXAM CHEST 1 VIEW: CPT | Mod: 26 | Performed by: RADIOLOGY

## 2022-08-08 PROCEDURE — 84100 ASSAY OF PHOSPHORUS: CPT | Performed by: SURGERY

## 2022-08-08 PROCEDURE — 250N000013 HC RX MED GY IP 250 OP 250 PS 637: Performed by: INTERNAL MEDICINE

## 2022-08-08 PROCEDURE — 94003 VENT MGMT INPAT SUBQ DAY: CPT | Performed by: INTERNAL MEDICINE

## 2022-08-08 PROCEDURE — 93750 INTERROGATION VAD IN PERSON: CPT | Performed by: INTERNAL MEDICINE

## 2022-08-08 RX ORDER — MAGNESIUM SULFATE HEPTAHYDRATE 40 MG/ML
2 INJECTION, SOLUTION INTRAVENOUS ONCE
Status: COMPLETED | OUTPATIENT
Start: 2022-08-08 | End: 2022-08-08

## 2022-08-08 RX ORDER — DEXMEDETOMIDINE HYDROCHLORIDE 4 UG/ML
.1-1.2 INJECTION, SOLUTION INTRAVENOUS CONTINUOUS
Status: DISCONTINUED | OUTPATIENT
Start: 2022-08-08 | End: 2022-08-09

## 2022-08-08 RX ORDER — MULTIVITAMIN,THERAPEUTIC
1 TABLET ORAL DAILY
Status: DISCONTINUED | OUTPATIENT
Start: 2022-08-09 | End: 2022-08-15

## 2022-08-08 RX ORDER — CALCIUM GLUCONATE 20 MG/ML
1 INJECTION, SOLUTION INTRAVENOUS ONCE
Status: COMPLETED | OUTPATIENT
Start: 2022-08-08 | End: 2022-08-08

## 2022-08-08 RX ADMIN — QUETIAPINE FUMARATE 25 MG: 25 TABLET ORAL at 18:00

## 2022-08-08 RX ADMIN — OLANZAPINE 5 MG: 5 TABLET, FILM COATED ORAL at 19:32

## 2022-08-08 RX ADMIN — CEFEPIME HYDROCHLORIDE 2 G: 2 INJECTION, POWDER, FOR SOLUTION INTRAVENOUS at 04:17

## 2022-08-08 RX ADMIN — SERTRALINE HYDROCHLORIDE 50 MG: 50 TABLET ORAL at 08:15

## 2022-08-08 RX ADMIN — ALBUMIN HUMAN 25 G: 0.05 INJECTION, SOLUTION INTRAVENOUS at 15:00

## 2022-08-08 RX ADMIN — ACETAMINOPHEN 975 MG: 325 TABLET, FILM COATED ORAL at 20:31

## 2022-08-08 RX ADMIN — OLANZAPINE 5 MG: 5 TABLET, FILM COATED ORAL at 08:31

## 2022-08-08 RX ADMIN — DIGOXIN 125 MCG: 125 TABLET ORAL at 08:15

## 2022-08-08 RX ADMIN — NYSTATIN 1000000 UNITS: 100000 SUSPENSION ORAL at 12:09

## 2022-08-08 RX ADMIN — MULTIVITAMIN 15 ML: LIQUID ORAL at 08:14

## 2022-08-08 RX ADMIN — QUETIAPINE FUMARATE 25 MG: 25 TABLET ORAL at 08:15

## 2022-08-08 RX ADMIN — HYDROXYCHLOROQUINE SULFATE 200 MG: 200 TABLET, FILM COATED ORAL at 08:15

## 2022-08-08 RX ADMIN — QUETIAPINE FUMARATE 100 MG: 50 TABLET ORAL at 21:31

## 2022-08-08 RX ADMIN — HYDRALAZINE HYDROCHLORIDE 20 MG: 20 INJECTION INTRAMUSCULAR; INTRAVENOUS at 02:06

## 2022-08-08 RX ADMIN — ACETAMINOPHEN 975 MG: 325 TABLET, FILM COATED ORAL at 14:02

## 2022-08-08 RX ADMIN — Medication 10 MG: at 21:31

## 2022-08-08 RX ADMIN — CAPTOPRIL 12.5 MG: 12.5 TABLET ORAL at 14:02

## 2022-08-08 RX ADMIN — VANCOMYCIN HYDROCHLORIDE 750 MG: 10 INJECTION, POWDER, LYOPHILIZED, FOR SOLUTION INTRAVENOUS at 06:09

## 2022-08-08 RX ADMIN — VANCOMYCIN HYDROCHLORIDE 750 MG: 10 INJECTION, POWDER, LYOPHILIZED, FOR SOLUTION INTRAVENOUS at 18:12

## 2022-08-08 RX ADMIN — NYSTATIN 1000000 UNITS: 100000 SUSPENSION ORAL at 09:20

## 2022-08-08 RX ADMIN — ACETAMINOPHEN 975 MG: 325 TABLET, FILM COATED ORAL at 06:09

## 2022-08-08 RX ADMIN — PANTOPRAZOLE SODIUM 40 MG: 40 INJECTION, POWDER, FOR SOLUTION INTRAVENOUS at 19:31

## 2022-08-08 RX ADMIN — PANTOPRAZOLE SODIUM 40 MG: 40 INJECTION, POWDER, FOR SOLUTION INTRAVENOUS at 08:14

## 2022-08-08 RX ADMIN — DEXMEDETOMIDINE HYDROCHLORIDE 0.2 MCG/KG/HR: 400 INJECTION INTRAVENOUS at 09:20

## 2022-08-08 RX ADMIN — LIDOCAINE PATCH 4% 1 PATCH: 40 PATCH TOPICAL at 08:16

## 2022-08-08 RX ADMIN — MAGNESIUM SULFATE IN WATER 2 G: 40 INJECTION, SOLUTION INTRAVENOUS at 08:17

## 2022-08-08 RX ADMIN — CEFEPIME HYDROCHLORIDE 2 G: 2 INJECTION, POWDER, FOR SOLUTION INTRAVENOUS at 12:05

## 2022-08-08 RX ADMIN — CALCIUM GLUCONATE 1 G: 20 INJECTION, SOLUTION INTRAVENOUS at 15:02

## 2022-08-08 RX ADMIN — HYDROXYCHLOROQUINE SULFATE 200 MG: 200 TABLET, FILM COATED ORAL at 19:32

## 2022-08-08 RX ADMIN — CEFEPIME HYDROCHLORIDE 2 G: 2 INJECTION, POWDER, FOR SOLUTION INTRAVENOUS at 19:31

## 2022-08-08 RX ADMIN — DEXTROSE MONOHYDRATE: 50 INJECTION, SOLUTION INTRAVENOUS at 02:06

## 2022-08-08 ASSESSMENT — ACTIVITIES OF DAILY LIVING (ADL)
ADLS_ACUITY_SCORE: 42
ADLS_ACUITY_SCORE: 39
ADLS_ACUITY_SCORE: 42

## 2022-08-08 NOTE — PLAN OF CARE
Goal Outcome Evaluation:    Plan of Care Reviewed With: patient     Overall Patient Progress: no change    Outcome Evaluation: stable on vent. discuss restarting TF    Major Shift Events:  Pt sedated on versed and fentanyl. FINCH. Follows commands. Is restless at times, requiring redirection. All pressors off, MAP >65. IV hydralazine given x1 for MAP>90. PO antihypertensives held per team at this time. HGB stable overnight at 9.3. No LVAD alarms overnight. Intubated, /12/5/40, scant bloody secretions via ETT. OGT to LIS with maroon/brown output. Continues with maroon/dark red stools, rectal tube placed after two rectal pouch attempts. TF on hold, team to readdress today. Fitzgerald with adequate UOP. Coccyx is reddened, mepilex and barrier cream applied.     Plan: potentially switch to precedex and PS today. Discuss restarting TF.   For vital signs and complete assessments, please see documentation flowsheets.

## 2022-08-08 NOTE — PROGRESS NOTES
Care Management Follow Up    Length of Stay (days): 52    Expected Discharge Date:  TBD        Patient plan of care discussed at interdisciplinary rounds: Yes    Anticipated Discharge Disposition:  TBD     Anticipated Discharge Services:  TBD  Anticipated Discharge DME:  TBD    Additional Information:  Pt is s/p HM3 LVAD 7/8/22 with intraoperative course c/b RV failure s/p 29F Protek duo via RIJ. Post op course c/b hemopericardium s/p repeat washout with chest left open. Chest closed 7/13/22 and decannulated from RVAD 7/21.  Pt was transferred out of ICU to IMC unit on 7/28/22.  Pt transferred back to ICU on 8/7 due to epistaxis and got intubated for airway protection.  ENT consulted.  RNCC will cont to follow plan of care.      Opal Wilhelm RN, PHN, BSN  4A and 4E/ ICU  Care Coordinator  Phone: [phone]  Pager: [phone]    To contact the weekend RNCC  [place] ([phone]) Saturday and Sunday    Units: 4A, 4C, 4E, 5A and 5B- Pager 1: [phone]    Units: 6A, 6B, 6C, 6D- Pager 2: [phone]    Units: 7A, 7B, 7C, 7D, and 5C-Pager 3: [phone]

## 2022-08-08 NOTE — PROGRESS NOTES
Calorie Count  Intake recorded for: 8/7  Total Kcals: 0 Total Protein: 0g  Kcals from Hospital Food: 0   Protein: 0g  Kcals from Outside Food (average):0 Protein: 0g  # Meals Recorded: 0 meals ordered, no food intake recorded  # Supplements Recorded: 0

## 2022-08-08 NOTE — PROGRESS NOTES
[facility]   Cards 2 - Progress Note    Dandy Sands MRN: [id number]  Age: 61 year old, : 1960  Date: 2022      Assessment and Plan:  Dandy Sands is a 61 year old male with PMH of lupus, antiphospholipid syndrome, HTN, HLD, HFrEF 2/2 ICM who presented with cardiogenic shock c/b multiorgan failure (JOSE, shock liver) requiring mechanical support with IABP, now s/p HM3 LVAD 22 by Dr. Zamora with intraoperative course c/b RV failure s/p 29F Protek duo via RIJ. Post op course c/b hemopericardium s/p repeat washout with chest left open. Chest closed 22 and decannulated from RVAD . Developed epistaxis on  night which was packed by ENT. Unfortunately bleeding persisted and there was concern for airway protection on  AM which prompted intubation and transfer to ICU.     Changes today:  -Change sedation to precedex to facilitate extubation  -Planf or SBT and possible extubation today. Cleared by ENT for extubation  -Restart Captopril 12.5mg TID  -Continue to hold warfarin/ASA    Neurology  #Insomnia/Delirium  Sedated on fentanyl and versed. Some concern that he had a fall overnight on . Discussed with nursing staff and he did NOT have a fall, although he did try to get out of bed. CT head was obtained () and it was unremarkable.  -Will switch sedation to precedex to facilitate extubation  -Continue Olanzapine, Sertraline and Quetiapine per previous Psych recommendations    Cardiovascular  #HFrEF 2/2 ICM s/p HM3 LVAD in setting of cardiogenic shock (SCAI D)  #RV failure s/p Protek duo temporary RVAD s/p decannulation   #RV thrombus  #Post chest closure hemopericardium s/p repeat washout ()  #PMH CAD s/p PCI to LAD ()  #S/p CRT-D ()  Patient with ICM s/p HM3 LVAD 22 by Dr. Zamora in setting of presentation for cardiogenic shock requiring MCS with IABP as prior to HM (initially evaluated for heart transplant, however noted to have  substantially elevated DSAs during course of care, so decision made to proceed with LVAD given patient already on temporary MCS). Intraoperative course c/b RV failure resulting in cardiogenic shock requiring high dose pressors necessitating RVAD support. Initial post-op course c/b hemopericardium requiring repeat washout with chest left open, however has since recovered well with decannulation on 7/21 with extubation day prior. Has continued to tolerate well from hemodynamic and end organ standpoint.   -LVAD: continues to have good flows, no alarms and stable end organ perfusion; continue speed at 5300  -continue digoxin for RV support  -AC, antiplatelets: ASA/warfarin on hold due to epistaxis. Will consider starting heparin for anticoagulation challenge in coming days  -Volume status: Holding bumex 3mg PO BID. Stopped IVFs since pt likely has reached euvolemia  -rhythm: sinus              -of note, patient with climbing capture threshold of RV lead; will need to be evaluated in future by EP team  -blood pressure: Continue Captopril 12.5mg TID    Pulmonary  #Epistaxis  #Intubated due to Concern for airway protection (8/7)  #Mild-moderate emphysema  #History of COVID-19  #Iatrogenic R apical PTX  H/o nasal perforation that required elective procedure (in [place]) which was postponed due to LVAD insertion. Developed epistaxis not fixed by packing overnight on 8/6 and pt continued to bleed. Intubated due to concern for ability to maintain airway from bleeding.  Vent Mode: CMV/AC  (Continuous Mandatory Ventilation/ Assist Control)  FiO2 (%): 50 %  Resp Rate (Set): 12 breaths/min  Tidal Volume (Set, mL): 550 mL  PEEP (cm H2O): 5 cmH2O  Resp: 17  -Wean vent as able. Plan for possible extubation today  -Appreciate ENT assistance in managing epistaxis. Plan to keep Merocels for 3 days, to be removed by ENT  -Cefepime/Vanc empirically for broad coverage for PNA (8/7-8/12)  -Continue  Breo-Ellipta    ENT:  #Epistaxis  #Dysphonia  Controlled at bedside by ENT s/p cautery and packing. Appreciate assistance. ENT will discuss long-term plan regarding septal perforation with pt post extubation.  -Management per ENT    Gastrointestinal, Nutrition  #GERD  #Congestive hepatopathy in the setting of cardiac insuffiencey - Resolved  #Hematemesis / oral mucosal bleeding -resolved    #Dysphagia  GI consulted 7/31 for black tarry stools and a possible GI bleed, however it is more likely swallowed blood from epistaxis that patient previously had. GI did not recommend an upper endoscopy. Recurrence of bloody stools likely due to epistaxis. Will monitor Hb.  -Hold TF for now  -Protonix 40mg IV BID    Renal, Electrolytes  - Strict I/O, daily weights   - Avoid/limit nephrotoxins as able  - Replete lytes PRN per protocol  - Holding on bumex today    Infectious Disease  #E.faecalis on culture from PICC line (8/2/22) - 1/2 tubes in 1/4 sets collected 8/2  #S.epidermidis (MSSE) on culture from PICC line (8/2/22) - 1/2 tubes in 1/4 sets collected 8/2  Vanc was supposed to end on 8/7 but given massive epistaxis will start broad spectrum antibiotics  -Cefepime/Vanc as above (since 8/7)    Hematology  #Anemia due to blood loss  #History of DVT and PE   #Antiphospholipid antibody syndrome  #History of iron deficiency anemia  #Epistaxis  -Hemoglobin check q6H  -Transfuse to keep Hb>7  -Warfarin and ASA on hold.    Endocrinology  SSI    Rheumatology:  #SLE  - PTA meds: hydroxychloroquine 200mg, resumed 7/16  - Will need cellcept restarted when appropriate    Pertinent Lines  R Fem arterial line  8/7  R Fem Mac Sheath 8/7  OG tube 8/7  ETT 8/7    ICU Cares:  Feeds: On hold given epistaxis  DVT Prophylaxis: On hold given epistaxis  GI Prophylaxis: PPI  Bowel Regimen: On hold given epistaxis and bloody stools    Patient seen and discussed with Dr. Stewart.  Assessment and plan as above.    Chris Lawrence MD  Cardiology  Fellow      Subjective:  Reviewed events from last 24 hours.  No acute events overnight.  Remains intubated and sedated.    Objective Findings:  Temp:  [97.9  F (36.6  C)-100.9  F (38.3  C)] 98.2  F (36.8  C)  Pulse:  [] 84  Resp:  [6-25] 17  BP: (119)/(98) 119/98  MAP:  [72 mmHg-113 mmHg] 85 mmHg  Arterial Line BP: ()/() 92/67  FiO2 (%):  [50 %] 50 %  SpO2:  [92 %-100 %] 96 %    Physical Exam:  GEN: Frail  HEENT: Nasal Packing present  Pulm: Coarse breath sounds bilaterally  Cardiac: LVAD hum +  GI: soft, non distended  Neuro: Sedated  Vascular: No lower extremity edema    Medications:    acetaminophen  975 mg Oral or Feeding Tube Q8H CAMMY     [Held by provider] aspirin  81 mg Oral or Feeding Tube Daily     [Held by provider] atorvastatin  40 mg Oral QPM     [Held by provider] bumetanide  3 mg Oral BID     captopril  12.5 mg Oral TID     ceFEPIme (MAXIPIME) IV  2 g Intravenous Q8H     digoxin  125 mcg Oral Daily     [Held by provider] fiber modular (NUTRISOURCE FIBER)  1 packet Per Feeding Tube TID     fluticasone-vilanterol  1 puff Inhalation Daily     hydroxychloroquine  200 mg Oral BID     insulin aspart  1-12 Units Subcutaneous Q4H     lidocaine  1 patch Transdermal Q24h    And     lidocaine   Transdermal Q8H CAMMY     lidocaine 3%, phenylephrine 0.25% solution for irrigation  5 mL Irrigation Once     melatonin  10 mg Oral At Bedtime     multivitamins w/minerals  15 mL Per Feeding Tube Daily     nystatin  1,000,000 Units Swish & Swallow 4x Daily     OLANZapine  5 mg Oral BID     oxidized cellulose   Topical Once     oxymetazoline  2 spray Both Nostrils BID     pantoprazole (PROTONIX) IV  40 mg Intravenous BID     [Held by provider] protein modular  1 packet Per Feeding Tube TID     QUEtiapine  100 mg Oral or Feeding Tube At Bedtime     QUEtiapine  25 mg Oral BID     sertraline  50 mg Oral Daily     silver nitrate   Topical Once     sodium chloride  2 spray Both Nostrils Q4H     tranexamic acid    Topical Once     vancomycin  750 mg Intravenous Q12H     [Held by provider] zolpidem  5 mg Oral At Bedtime       dexmedetomidine 0.2 mcg/kg/hr (08/08/22 0920)     dextrose       fentaNYL 50 mcg/hr (08/08/22 0900)     midazolam 4 mg/hr (08/08/22 0900)     norepinephrine Stopped (08/07/22 1100)       Labs:   [facility]  Recent Labs   Lab 08/08/22  0826 08/08/22  0403 08/08/22  0400 08/07/22  2351 08/07/22  2028 08/07/22  1716 08/07/22  1013 08/07/22  0957 08/07/22  0307 08/07/22  0231 08/06/22  0757 08/06/22  0444 08/05/22  0759 08/05/22  0440   NA  --   --  133*  --   --  135*  --  133*  --  133*  --  131*  --  134*   POTASSIUM  --   --  4.3  --   --  4.9  --  5.6*  --  4.6  --  3.8  --  3.8   CHLORIDE  --   --  100  --   --  101  --  99  --  95*  --  93*  --  95*   CO2  --   --  26  --   --  26  --  25  --  28  --  29  --  28   ANIONGAP  --   --  7  --   --  8  --  9  --  10  --  9  --  11   * 106* 108* 91   < > 100*   < > 118*   < > 115*   < > 129*   < > 129*   BUN  --   --  28.0*  --   --  30.1*  --  31.5*  --  26.3*  --  28.1*  --  26.6*   CR  --   --  0.71  --   --  0.69  --  0.67  --  0.67  --  0.72  --  0.73   GFRESTIMATED  --   --  >90  --   --  >90  --  >90  --  >90  --  >90  --  >90   ELDA  --   --  8.8  --   --  8.6*  --  8.1*  --  8.6*  --  8.5*  --  8.6*   MAG  --   --  1.9  --   --   --   --   --   --  1.8  --  1.8  --  2.1   PHOS  --   --  3.5  --   --   --   --   --   --  4.2  --  4.7*  --  4.5   PROTTOTAL  --   --  5.9*  --   --  6.0*  --  6.1*  --  6.7  --  6.8  --  6.9   ALBUMIN  --   --  2.7*  --   --  2.9*  --  3.1*  --  3.1*  --  3.0*  --  3.0*   BILITOTAL  --   --  0.6  --   --  0.8  --  0.6  --  0.4  --  0.4  --  0.4   ALKPHOS  --   --  144*  --   --  150*  --  166*  --  214*  --  203*  --  211*   AST  --   --  42  --   --  40  --  51*  --  31  --  34  --  34   ALT  --   --  25  --   --  20  --  21  --  20  --  19  --  16    < > = values in this interval not displayed.     CBC  Recent Labs    Lab 08/08/22  0400 08/07/22  2349 08/07/22 2026 08/07/22  1327 08/07/22  0957 08/07/22  0754 08/07/22  0231 08/06/22  1210 08/06/22  0444   WBC 9.2  --   --   --  17.0*  --  12.1*  --  8.4   RBC 3.19*  --   --   --  2.92*  --  2.95*  --  3.02*   HGB 9.3* 8.5* 8.9* 9.1* 8.4*   < > 8.5*   < > 8.7*  8.7*   HCT 28.2*  --   --   --  26.6*  --  27.4*  --  28.2*   MCV 88  --   --   --  91  --  93  --  93   MCH 29.2  --   --   --  28.8  --  28.8  --  28.8   MCHC 33.0  --   --   --  31.6  --  31.0*  --  30.9*   RDW 16.1*  --   --   --  16.2*  --  16.6*  --  16.9*     --   --   --  314  --  479*  --  390    < > = values in this interval not displayed.     Arterial Blood Gas  Recent Labs   Lab 08/08/22  0359 08/07/22 2025 08/07/22 1328 08/07/22  1016 08/07/22  0957   PH 7.44 7.46* 7.43  --  7.32*   PCO2 40 39 42 54* 50*   PO2 201* 162* 271*  --  247*   HCO3 28 28 28 28 26   O2PER 50 50 80  --  80

## 2022-08-08 NOTE — PROCEDURES
INDICATION: hemorrhagic shock, access need  PROCEDURE : Justo Stewart MD  ATTENDING PHYSICIAN: Self  Procedure: central line placement  Diagnosis: hemorrhagic shock     CONSENT: Unable to obtain consent due to emergency nature of the procedure, it was implied. Multiple physicians in agreement with central line need.       PROCEDURE SUMMARY:   A completely sterile technique was utilized with mask, gown on  and team. A time out was performed. The patient was placed in supine position, the right femoral vein was prepped using chlorhexidine scrub and draped in  sterile fashion using a full drape and sterile probe cover and sterile  gel employed. After lidocaine infiltration the R femoral vein was accessed using an 18G needle and a 9Fr MAC sheath was placed using Seldinger technique without any difficulties or complications. The line was sutured in place after all ports were appropriately flushed. Sterile dressing was applied. It was ready for use immediately. Estimated blood loss was negligible. No complications noted.  Justo Stewart MD

## 2022-08-08 NOTE — PROGRESS NOTES
Otolaryngology - Head & Neck Surgery Progress Note  8/8/2022    S: No further bleeding from nose or mouth since yesterday morning. Doing well from a respiratory perspective. No other indications for being intubated.    O:  Temp: 98.2  F (36.8  C) Temp src: Bladder BP: (!) 119/98 Pulse: 84   Resp: 17 SpO2: 96 % O2 Device: Mechanical Ventilator     HEENT: Merocels in place bilaterally. No signs of bleeding from the nose anteriorly or posteriorly. Intubated.     A/P:  - From ENT perspective, no current need for patient to be intubated; wean and extubate per primary team  - Leave merocels in place for 3 days. These will be removed by ENT.  - Nasal saline q4h.  - Antibiotic staph prophylaxis while packing place; covered with current vancomycin/cefepime.  - Afrin BID x3 days applied directly onto the nasal packing.  - In the case of further bleeding, spray Afrin directly on the nasal packing and hold firm pressure for 10-15 minutes. If bleeding continues, call ENT for further recommendations.    Gm Dave MD  Otolaryngology - Head & Neck Surgery  Page ENT with concerns by dialing * [phone] and entering job code [id number].

## 2022-08-08 NOTE — PLAN OF CARE
Goal Outcome Evaluation:    Plan of Care Reviewed With: patient, daughter     Overall Patient Progress: improving    Outcome Evaluation: Extubated, mentation improving    Major Shift Events:  FINCH to command. Oriented, but confused. Afebrile. Sedation weaned, extubated to oxiplus. Apneic with sleep, attempted BIPAP. Not tolerated well. Hbg recheck stable at 8.7. 500cc 5% albumin given for hypotension. MAP improved. Swabs given orally for dry mouth. Able to take pills with pudding. UOP trending down.    Plan: Monitor mentation, respiratory status, and s/sx of bleeding.  For vital signs and complete assessments, please see documentation flowsheets.

## 2022-08-08 NOTE — PLAN OF CARE
Goal Outcome Evaluation:    Plan of Care Reviewed With:  (unable to discuss with pt at this time)     Overall Patient Progress: declining    Outcome Evaluation: TFs stopped/NJT removed

## 2022-08-08 NOTE — PROGRESS NOTES
CLINICAL NUTRITION SERVICES - REASSESSMENT NOTE     Nutrition Prescription    RECOMMENDATIONS FOR MDs/PROVIDERS TO ORDER:  If post-pyloric FT access needed, please consult radiology    Malnutrition Status:    Severe malnutrition in the context of acute on chronic illness    Recommendations already ordered by Registered Dietitian (RD):  -Discontinued NS fiber at this time until TFs resumed   -Discontinued liquid MVI as pt having loose stools  -Ordered theravit tablet in hopes to decrease stool output     Future/Additional Recommendations:  Recommend initiating EN via OGT or NJT:  Osmolite 1.5 Virgilio @ goal of 50ml/hr (1200ml/day) + 1 pkt Prosource TID will provide: 1920 kcals (30 kcal/kg), 108 g PRO (1.7 g/kg), 914 ml free H20, 244 g CHO, and 0 g fiber daily per new dosing wt of 63 kg  - Initiate @ goal rate   - Do not start or advance until lytes (Mg++,K+) WNL and phos>2.0  - Recommend 30 ml q4hr fluid flushes for tube patency. Additional fluids and/or adjustments per MD.    - Elevated HOB with gastric feeds      EVALUATION OF THE PROGRESS TOWARD GOALS   Diet: NPO  Nutrition Support: Pt seemed to be tolerating cyclic TFs.     7/11-7/29: Osmolite 1.5 Virgilio @ goal of 50ml/hr (1200ml/day) + 1 pkt Prosource TID will provide: 1920 kcals (30 kcal/kg), 108 g PRO (1.7 g/kg), 914 ml free H20, 244 g CHO, and 0 g fiber daily per new dosing wt of 63 kg.     7/29-8/7: Osmolite 1.5 Virgilio @ 75 ml/hr x 16 hours (1200ml/day) + 1 pkt Prosource TID will provide: 1920 kcals (30 kcal/kg), 108 g PRO (1.7 g/kg), 914 ml free H20, 244 g CHO, and 0 g fiber daily per new dosing wt of 63 kg.    Pt received an average of 943 ml TF/d over the past 7 days + an average of 3 pkts Prosource daily. This provided an average of 1535 kcal/d (24 kcal/kg) and 92 g protein/d (1.5 g/kg). This met 97% estimated energy and protein needs.     Pt taking % of PO intake per RN documentation. Pt ordering chicken noodle soup, chili, milk, omelets, pot roast,  prateek.      NEW FINDINGS   Pt transferred back to ICU on 8/7 due to epistaxis and intubation for airway protection.     GI: LBM 8/8; loose stools    Labs: Reviewed - hyponatremia    Medications: Reviewed    Weights: No weight loss over the past week. Above admit weight.   08/08/22 0400 72.3 kg (159 lb 6.3 oz)   08/07/22 0315 64.6 kg (142 lb 6.7 oz)   08/06/22 0400 65 kg (143 lb 4.8 oz)   08/05/22 1300 63.5 kg (139 lb 15.9 oz)   08/05/22 0110 65.7 kg (144 lb 13.5 oz)   08/04/22 0557 65 kg (143 lb 4.8 oz)   08/03/22 1104 63.4 kg (139 lb 12.4 oz)   08/03/22 0615 68.4 kg (150 lb 12.7 oz)   08/02/22 0206 68.5 kg (151 lb 0.2 oz)   08/01/22 0618 67.8 kg (149 lb 7.6 oz)     MALNUTRITION  % Intake: Adequate via TF + PO intake  % Weight Loss: None noted  Subcutaneous Fat Loss: Facial region:  Moderate, Upper arm:  Moderate and Thoracic/intercostal:  Moderate  Muscle Loss: Facial & jaw region:  Moderate, Thoracic region (clavicle, acromium bone, deltoid, trapezius, pectoral):  Moderate, Upper arm (bicep, tricep):  Moderate and Dorsal hand: Moderate  Fluid Accumulation/Edema: None noted (trace)  Malnutrition Diagnosis: Severe malnutrition in the context of acute on chronic illness    Previous Goals   Total avg nutritional intake to meet a minimum of 25 kcal/kg and 1.5 g PRO/kg daily (per dosing wt 63 kg)  Evaluation: Protein goal met, energy goal not met    Previous Nutrition Diagnosis  Inadequate oral intake related to dysphagia, AMS at times as evidenced by pt eating minimally and continues to be reliant on EN support for full nutrition needs at this time  Evaluation: No longer applicable, nutrition diagnosis changed below    CURRENT NUTRITION DIAGNOSIS  Inadequate oral intake related to NPO status as evidenced by re-intubation.       INTERVENTIONS  Implementation  Enteral Nutrition - Initiate as able     Goals  Total avg nutritional intake to meet a minimum of 25 kcal/kg and 1.5 g PRO/kg daily (per dosing wt 63  kg).    Monitoring/Evaluation  Progress toward goals will be monitored and evaluated per protocol.    Kelsey Prado MS, RD, LD  4E (CVICU) RD pager: [phone]  Ascom: [phone]  Weekend/Holiday RD pager: [phone]

## 2022-08-08 NOTE — PLAN OF CARE
Goal Outcome Evaluation:    Plan of Care Reviewed With: patient , Son called by Cards 2 today and Daughter updated at bedside by Cards 2.    Overall Patient Progress: declining         Shift durration: 0927-1930    Neuro/LOC:  sedated on 2 mg/hr versed, 50 mcg/hr fentanyl. RASS -2 to -3, spastic with movements, moves all 4 extremities, not to command. Was confused prior to intubation per report.  Cards: LVAD Flow 3.2, PI 2.4-6.5, RPM 5300, Power 3.6. no pressors. Doppler pulses marked.  Pulm: LS coarse, /12/5/50% via ETT. Bloody secretions via ETT, brown serosanguineous secretion oral.  GI/: OG to LIS and for meds, maroon/brown output. Bloody maroon stools. Feeds held tonight. Urethral marcelo placed, uop ~75.hr.  Skin: diffuse abrasions/bruises. Old dressing to R back, midline incision approximated with erythema. Old chest tube site scabs to abdomen, Drive line erythema.  Mobility: bedrest today d/t instability.  Vasc access/Drains/Device: L PIV, R fem art line and MAC. OG, marcelo.    Special/Event: Code Gaurav, Intubation, MTP 4 unit(s) PRBC and 2L Fl. CT head done d/t hx of fall yesterday per report. Continue per plan of care.      Admitted/transferred from: 6B  Reason for admission/transfer: Large epistaxis requiring Intubation for airway protection, fluid resuscitation and MTP  Patient status upon admission/transfer: sedated, paralyzed, MTP.  Plan: stabilize pt on ICU.  2 RN skin assessment: completed by Nancy CHAPA and Nancy MARTINEZ RN  Result of skin assessment and interventions/actions: red franklin area, interdry applied and franklin cream. Scabs and bruises diffuse.  Height, weight, drug calc weight: done  Patient belongings: bedside  MDRO education (if applicable): N/A

## 2022-08-09 ENCOUNTER — APPOINTMENT (OUTPATIENT)
Dept: OCCUPATIONAL THERAPY | Facility: CLINIC | Age: 62
DRG: 001 | End: 2022-08-09
Attending: INTERNAL MEDICINE
Payer: COMMERCIAL

## 2022-08-09 ENCOUNTER — APPOINTMENT (OUTPATIENT)
Dept: SPEECH THERAPY | Facility: CLINIC | Age: 62
DRG: 001 | End: 2022-08-09
Attending: INTERNAL MEDICINE
Payer: COMMERCIAL

## 2022-08-09 ENCOUNTER — APPOINTMENT (OUTPATIENT)
Dept: GENERAL RADIOLOGY | Facility: CLINIC | Age: 62
DRG: 001 | End: 2022-08-09
Attending: INTERNAL MEDICINE
Payer: COMMERCIAL

## 2022-08-09 LAB
ALBUMIN SERPL BCG-MCNC: 2.9 G/DL (ref 3.5–5.2)
ALP SERPL-CCNC: 129 U/L (ref 40–129)
ALT SERPL W P-5'-P-CCNC: 20 U/L (ref 10–50)
ANION GAP SERPL CALCULATED.3IONS-SCNC: 8 MMOL/L (ref 7–15)
AST SERPL W P-5'-P-CCNC: 27 U/L (ref 10–50)
BACTERIA BLD CULT: ABNORMAL
BACTERIA BLD CULT: ABNORMAL
BACTERIA BLD CULT: NO GROWTH
BACTERIA BLD CULT: NO GROWTH
BILIRUB SERPL-MCNC: 0.6 MG/DL
BUN SERPL-MCNC: 19.1 MG/DL (ref 8–23)
CA-I BLD-MCNC: 4.8 MG/DL (ref 4.4–5.2)
CALCIUM SERPL-MCNC: 8.8 MG/DL (ref 8.8–10.2)
CHLORIDE SERPL-SCNC: 103 MMOL/L (ref 98–107)
CREAT SERPL-MCNC: 0.64 MG/DL (ref 0.67–1.17)
DEPRECATED HCO3 PLAS-SCNC: 23 MMOL/L (ref 22–29)
ERYTHROCYTE [DISTWIDTH] IN BLOOD BY AUTOMATED COUNT: 16.6 % (ref 10–15)
GFR SERPL CREATININE-BSD FRML MDRD: >90 ML/MIN/1.73M2
GLUCOSE BLDC GLUCOMTR-MCNC: 113 MG/DL (ref 70–99)
GLUCOSE BLDC GLUCOMTR-MCNC: 118 MG/DL (ref 70–99)
GLUCOSE BLDC GLUCOMTR-MCNC: 118 MG/DL (ref 70–99)
GLUCOSE BLDC GLUCOMTR-MCNC: 149 MG/DL (ref 70–99)
GLUCOSE BLDC GLUCOMTR-MCNC: 77 MG/DL (ref 70–99)
GLUCOSE BLDC GLUCOMTR-MCNC: 93 MG/DL (ref 70–99)
GLUCOSE BLDC GLUCOMTR-MCNC: 97 MG/DL (ref 70–99)
GLUCOSE BLDC GLUCOMTR-MCNC: 97 MG/DL (ref 70–99)
GLUCOSE SERPL-MCNC: 96 MG/DL (ref 70–99)
HCT VFR BLD AUTO: 26.3 % (ref 40–53)
HGB BLD-MCNC: 8.5 G/DL (ref 13.3–17.7)
HGB BLD-MCNC: 9.4 G/DL (ref 13.3–17.7)
INR PPP: 1.59 (ref 0.85–1.15)
LACTATE SERPL-SCNC: 0.7 MMOL/L (ref 0.7–2)
MAGNESIUM SERPL-MCNC: 2.3 MG/DL (ref 1.7–2.3)
MCH RBC QN AUTO: 29.1 PG (ref 26.5–33)
MCHC RBC AUTO-ENTMCNC: 32.3 G/DL (ref 31.5–36.5)
MCV RBC AUTO: 90 FL (ref 78–100)
PHOSPHATE SERPL-MCNC: 4.3 MG/DL (ref 2.5–4.5)
PLATELET # BLD AUTO: 250 10E3/UL (ref 150–450)
POTASSIUM SERPL-SCNC: 3.7 MMOL/L (ref 3.4–5.3)
PROT SERPL-MCNC: 5.8 G/DL (ref 6.4–8.3)
RBC # BLD AUTO: 2.92 10E6/UL (ref 4.4–5.9)
SODIUM SERPL-SCNC: 134 MMOL/L (ref 136–145)
VANCOMYCIN SERPL-MCNC: 20.1 UG/ML
WBC # BLD AUTO: 8.7 10E3/UL (ref 4–11)

## 2022-08-09 PROCEDURE — 82330 ASSAY OF CALCIUM: CPT | Performed by: SURGERY

## 2022-08-09 PROCEDURE — 85027 COMPLETE CBC AUTOMATED: CPT | Performed by: SURGERY

## 2022-08-09 PROCEDURE — 99291 CRITICAL CARE FIRST HOUR: CPT | Mod: 25 | Performed by: INTERNAL MEDICINE

## 2022-08-09 PROCEDURE — 999N000157 HC STATISTIC RCP TIME EA 10 MIN

## 2022-08-09 PROCEDURE — 93750 INTERROGATION VAD IN PERSON: CPT | Performed by: INTERNAL MEDICINE

## 2022-08-09 PROCEDURE — 258N000003 HC RX IP 258 OP 636: Performed by: STUDENT IN AN ORGANIZED HEALTH CARE EDUCATION/TRAINING PROGRAM

## 2022-08-09 PROCEDURE — 250N000013 HC RX MED GY IP 250 OP 250 PS 637: Performed by: STUDENT IN AN ORGANIZED HEALTH CARE EDUCATION/TRAINING PROGRAM

## 2022-08-09 PROCEDURE — 250N000013 HC RX MED GY IP 250 OP 250 PS 637: Performed by: INTERNAL MEDICINE

## 2022-08-09 PROCEDURE — 83605 ASSAY OF LACTIC ACID: CPT | Performed by: SURGERY

## 2022-08-09 PROCEDURE — G0463 HOSPITAL OUTPT CLINIC VISIT: HCPCS

## 2022-08-09 PROCEDURE — 84100 ASSAY OF PHOSPHORUS: CPT | Performed by: SURGERY

## 2022-08-09 PROCEDURE — 250N000013 HC RX MED GY IP 250 OP 250 PS 637: Performed by: THORACIC SURGERY (CARDIOTHORACIC VASCULAR SURGERY)

## 2022-08-09 PROCEDURE — C9113 INJ PANTOPRAZOLE SODIUM, VIA: HCPCS | Performed by: STUDENT IN AN ORGANIZED HEALTH CARE EDUCATION/TRAINING PROGRAM

## 2022-08-09 PROCEDURE — 258N000001 HC RX 258: Performed by: SURGERY

## 2022-08-09 PROCEDURE — 92526 ORAL FUNCTION THERAPY: CPT | Mod: GN

## 2022-08-09 PROCEDURE — 250N000011 HC RX IP 250 OP 636: Performed by: STUDENT IN AN ORGANIZED HEALTH CARE EDUCATION/TRAINING PROGRAM

## 2022-08-09 PROCEDURE — 80053 COMPREHEN METABOLIC PANEL: CPT | Performed by: SURGERY

## 2022-08-09 PROCEDURE — 80202 ASSAY OF VANCOMYCIN: CPT | Performed by: INTERNAL MEDICINE

## 2022-08-09 PROCEDURE — 214N000001 HC R&B CCU UMMC

## 2022-08-09 PROCEDURE — 92610 EVALUATE SWALLOWING FUNCTION: CPT | Mod: GN

## 2022-08-09 PROCEDURE — 97110 THERAPEUTIC EXERCISES: CPT | Mod: GO

## 2022-08-09 PROCEDURE — 999N000015 HC STATISTIC ARTERIAL MONITORING DAILY

## 2022-08-09 PROCEDURE — 83735 ASSAY OF MAGNESIUM: CPT | Performed by: SURGERY

## 2022-08-09 PROCEDURE — 71045 X-RAY EXAM CHEST 1 VIEW: CPT | Mod: 26 | Performed by: RADIOLOGY

## 2022-08-09 PROCEDURE — 250N000013 HC RX MED GY IP 250 OP 250 PS 637: Performed by: SURGERY

## 2022-08-09 PROCEDURE — 85610 PROTHROMBIN TIME: CPT | Performed by: SURGERY

## 2022-08-09 PROCEDURE — 250N000013 HC RX MED GY IP 250 OP 250 PS 637: Performed by: PHYSICIAN ASSISTANT

## 2022-08-09 PROCEDURE — 250N000009 HC RX 250: Performed by: STUDENT IN AN ORGANIZED HEALTH CARE EDUCATION/TRAINING PROGRAM

## 2022-08-09 PROCEDURE — 71045 X-RAY EXAM CHEST 1 VIEW: CPT

## 2022-08-09 PROCEDURE — 250N000011 HC RX IP 250 OP 636: Performed by: SURGERY

## 2022-08-09 PROCEDURE — 36415 COLL VENOUS BLD VENIPUNCTURE: CPT | Performed by: STUDENT IN AN ORGANIZED HEALTH CARE EDUCATION/TRAINING PROGRAM

## 2022-08-09 PROCEDURE — 85018 HEMOGLOBIN: CPT | Performed by: STUDENT IN AN ORGANIZED HEALTH CARE EDUCATION/TRAINING PROGRAM

## 2022-08-09 RX ORDER — POTASSIUM CHLORIDE 750 MG/1
20 TABLET, EXTENDED RELEASE ORAL ONCE
Status: COMPLETED | OUTPATIENT
Start: 2022-08-09 | End: 2022-08-09

## 2022-08-09 RX ORDER — HYDRALAZINE HYDROCHLORIDE 10 MG/1
10 TABLET, FILM COATED ORAL EVERY 8 HOURS SCHEDULED
Status: DISCONTINUED | OUTPATIENT
Start: 2022-08-09 | End: 2022-08-11

## 2022-08-09 RX ORDER — HYDROXYZINE HYDROCHLORIDE 10 MG/1
10 TABLET, FILM COATED ORAL EVERY 12 HOURS PRN
Status: DISCONTINUED | OUTPATIENT
Start: 2022-08-09 | End: 2022-08-10

## 2022-08-09 RX ADMIN — HYDROXYCHLOROQUINE SULFATE 200 MG: 200 TABLET, FILM COATED ORAL at 08:06

## 2022-08-09 RX ADMIN — ACETAMINOPHEN 975 MG: 325 TABLET, FILM COATED ORAL at 15:12

## 2022-08-09 RX ADMIN — SALINE NASAL SPRAY 2 SPRAY: 1.5 SOLUTION NASAL at 19:19

## 2022-08-09 RX ADMIN — QUETIAPINE FUMARATE 50 MG: 50 TABLET ORAL at 02:13

## 2022-08-09 RX ADMIN — OLANZAPINE 5 MG: 5 TABLET, FILM COATED ORAL at 08:06

## 2022-08-09 RX ADMIN — ONDANSETRON 4 MG: 2 INJECTION INTRAMUSCULAR; INTRAVENOUS at 06:58

## 2022-08-09 RX ADMIN — ACETAMINOPHEN 650 MG: 325 TABLET, FILM COATED ORAL at 20:27

## 2022-08-09 RX ADMIN — PANTOPRAZOLE SODIUM 40 MG: 40 INJECTION, POWDER, FOR SOLUTION INTRAVENOUS at 08:00

## 2022-08-09 RX ADMIN — NYSTATIN 1000000 UNITS: 100000 SUSPENSION ORAL at 16:37

## 2022-08-09 RX ADMIN — LIDOCAINE PATCH 4% 1 PATCH: 40 PATCH TOPICAL at 08:07

## 2022-08-09 RX ADMIN — PANTOPRAZOLE SODIUM 40 MG: 40 INJECTION, POWDER, FOR SOLUTION INTRAVENOUS at 19:19

## 2022-08-09 RX ADMIN — HYDROXYCHLOROQUINE SULFATE 200 MG: 200 TABLET, FILM COATED ORAL at 19:20

## 2022-08-09 RX ADMIN — NYSTATIN 1000000 UNITS: 100000 SUSPENSION ORAL at 08:06

## 2022-08-09 RX ADMIN — HYDRALAZINE HYDROCHLORIDE 10 MG: 20 INJECTION INTRAMUSCULAR; INTRAVENOUS at 10:31

## 2022-08-09 RX ADMIN — NYSTATIN 1000000 UNITS: 100000 SUSPENSION ORAL at 19:20

## 2022-08-09 RX ADMIN — SALINE NASAL SPRAY 2 SPRAY: 1.5 SOLUTION NASAL at 12:52

## 2022-08-09 RX ADMIN — THERA TABS 1 TABLET: TAB at 10:58

## 2022-08-09 RX ADMIN — NYSTATIN 1000000 UNITS: 100000 SUSPENSION ORAL at 12:34

## 2022-08-09 RX ADMIN — OXYCODONE HYDROCHLORIDE 5 MG: 5 TABLET ORAL at 20:27

## 2022-08-09 RX ADMIN — SALINE NASAL SPRAY 2 SPRAY: 1.5 SOLUTION NASAL at 16:37

## 2022-08-09 RX ADMIN — ACETAMINOPHEN 975 MG: 325 TABLET, FILM COATED ORAL at 22:01

## 2022-08-09 RX ADMIN — OXYCODONE HYDROCHLORIDE 5 MG: 5 TABLET ORAL at 16:36

## 2022-08-09 RX ADMIN — OLANZAPINE 5 MG: 5 TABLET, FILM COATED ORAL at 19:19

## 2022-08-09 RX ADMIN — QUETIAPINE FUMARATE 25 MG: 25 TABLET ORAL at 08:05

## 2022-08-09 RX ADMIN — OXYCODONE HYDROCHLORIDE 5 MG: 5 TABLET ORAL at 13:00

## 2022-08-09 RX ADMIN — OXYCODONE HYDROCHLORIDE 5 MG: 5 TABLET ORAL at 00:50

## 2022-08-09 RX ADMIN — SALINE NASAL SPRAY 2 SPRAY: 1.5 SOLUTION NASAL at 08:36

## 2022-08-09 RX ADMIN — QUETIAPINE FUMARATE 100 MG: 50 TABLET ORAL at 22:00

## 2022-08-09 RX ADMIN — DEXTROSE MONOHYDRATE 1000 ML: 100 INJECTION, SOLUTION INTRAVENOUS at 00:05

## 2022-08-09 RX ADMIN — VANCOMYCIN HYDROCHLORIDE 750 MG: 10 INJECTION, POWDER, LYOPHILIZED, FOR SOLUTION INTRAVENOUS at 20:44

## 2022-08-09 RX ADMIN — CEFEPIME HYDROCHLORIDE 2 G: 2 INJECTION, POWDER, FOR SOLUTION INTRAVENOUS at 04:13

## 2022-08-09 RX ADMIN — CEFEPIME HYDROCHLORIDE 2 G: 2 INJECTION, POWDER, FOR SOLUTION INTRAVENOUS at 11:29

## 2022-08-09 RX ADMIN — DIGOXIN 125 MCG: 125 TABLET ORAL at 08:06

## 2022-08-09 RX ADMIN — HYDRALAZINE HYDROCHLORIDE 10 MG: 10 TABLET, FILM COATED ORAL at 22:01

## 2022-08-09 RX ADMIN — HYDRALAZINE HYDROCHLORIDE 10 MG: 10 TABLET, FILM COATED ORAL at 15:26

## 2022-08-09 RX ADMIN — QUETIAPINE FUMARATE 25 MG: 25 TABLET ORAL at 16:36

## 2022-08-09 RX ADMIN — POTASSIUM CHLORIDE 20 MEQ: 750 TABLET, EXTENDED RELEASE ORAL at 04:59

## 2022-08-09 RX ADMIN — OXYCODONE HYDROCHLORIDE 5 MG: 5 TABLET ORAL at 09:17

## 2022-08-09 RX ADMIN — FLUTICASONE FUROATE AND VILANTEROL TRIFENATATE 1 PUFF: 100; 25 POWDER RESPIRATORY (INHALATION) at 08:34

## 2022-08-09 RX ADMIN — ACETAMINOPHEN 975 MG: 325 TABLET, FILM COATED ORAL at 05:00

## 2022-08-09 RX ADMIN — SERTRALINE HYDROCHLORIDE 50 MG: 50 TABLET ORAL at 08:06

## 2022-08-09 RX ADMIN — OXYMETAZOLINE HYDROCHLORIDE 2 SPRAY: 0.05 SPRAY NASAL at 10:57

## 2022-08-09 RX ADMIN — CEFEPIME HYDROCHLORIDE 2 G: 2 INJECTION, POWDER, FOR SOLUTION INTRAVENOUS at 20:15

## 2022-08-09 RX ADMIN — HYDROXYZINE HYDROCHLORIDE 10 MG: 10 TABLET ORAL at 12:38

## 2022-08-09 RX ADMIN — VANCOMYCIN HYDROCHLORIDE 750 MG: 10 INJECTION, POWDER, LYOPHILIZED, FOR SOLUTION INTRAVENOUS at 06:01

## 2022-08-09 RX ADMIN — Medication 10 MG: at 22:00

## 2022-08-09 ASSESSMENT — ACTIVITIES OF DAILY LIVING (ADL)
ADLS_ACUITY_SCORE: 36
ADLS_ACUITY_SCORE: 37
ADLS_ACUITY_SCORE: 39
ADLS_ACUITY_SCORE: 39
ADLS_ACUITY_SCORE: 36
ADLS_ACUITY_SCORE: 39
ADLS_ACUITY_SCORE: 36
ADLS_ACUITY_SCORE: 39

## 2022-08-09 NOTE — PLAN OF CARE
Major Shift Events:  Restless, oriented x3, forgetful frequent redirection. Follows commands. Attempted BiPAP, tolerated poorly. VSS on 2L. SR. Rectal tube with decreased maroon output. Fitzgerald with adequate output.   Plan: Monitor for bleeding.   For vital signs and complete assessments, please see documentation flowsheets.

## 2022-08-09 NOTE — PROGRESS NOTES
[facility] Nurse Inpatient Assessment     Consulted for: R) temporal wound    Patient History (according to provider note(s):      Dandy Sands is a 61 year old male with a PMH of CAD s/p PCI to LAD, MI, ICM, acute on chronic heart failure, s/p CRT-D, severe MR, antiphospholipid antibody syndrome on chronic warfarin, SLE, HTN, HLD, recent hospitalization 5/16-5/26 s/p RCA, LAD stenting who underwent RVAD (29F Protek duo RIJ) LVAD placement (HeartMate 3) on 7/8/22 by Dr. Zamora.    Areas Assessed:      Areas visualized during today's visit:  head    Pressure Injury Location: R) temporal area        7/13 7/20 7/27 8/2 8/9      Wound type: Pressure Injury     Pressure Injury Stage: Deep Tissue Pressure Injury (DTPI), hospital acquired      This is a Medical Device Related Pressure Injury (MDRPI) due to  RVAD Cannula was pressing against the area and wrapped with coban.  Currently has optifoam in place and tube is pulled away from the area avoiding direct pressure.  Wound history/plan of care:   Per RN tube was directly laying on the area,    Wound base: 100 % superficial scab     Palpation of the wound bed: normal      Drainage: none     Description of drainage: none     Measurements (length x width x depth, in cm)superficial scab: 0.8x 0.3 x 0 cm     Tunneling N/A     Undermining N/A  Periwound skin: Intact      Color: normal and consistent with surrounding tissue      Temperature: normal   Odor: none  Pain: absent and no grimacing or signs of discomfort, none  Pain intervention prior to dressing change: N/A  Treatment goal: Heal  and Protection  STATUS: healing  Supplies ordered: supplies stored on unit    Treatment Plan:     R) temporal area wound(s): Every 3 days       Cleanse the area with NS and pat dry.    Apply No sting film barrier to periwound skin.    Cover wound  with Mepilex     Change dressing Q 3 days.      Orders: Reviewed    RECOMMEND PRIMARY TEAM ORDER: None, at this time  Education provided: importance of repositioning, plan of care, wound progress and Off-loading pressure  Discussed plan of care with: Nurse  [facility] nurse follow-up plan: weekly  Notify WO if wound(s) deteriorate.  Nursing to notify the Provider(s) and re-consult the [facility] Nurse if new skin concern.    DATA:     Current support surface: Standard  Low air loss mattress  Containment of urine/stool: Indwelling catheter  BMI: Body mass index is 26.17 kg/m .   Active diet order: Orders Placed This Encounter      NPO per Anesthesia Guidelines for Procedure/Surgery Except for: Meds         Labs: Recent Labs     Recent Labs   Lab Test 08/09/22  0341 08/03/22  1154 08/03/22  0606 05/20/22  1715 05/20/22  0516   ALBUMIN 2.9*   < > 2.9*   < > 3.1*   HGB 8.5*   < > 8.8*  8.8*   < > 8.6*   INR 1.59*   < > 2.36*   < > 1.24*   WBC 8.7   < > 9.5   < > 5.5   A1C  --   --   --   --  5.5   CRP  --   --  141.00*   < >  --     < > = values in this interval not displayed.       Pressure injury risk assessment:   Sensory Perception: 3-->slightly limited  Moisture: 4-->rarely moist  Activity: 2-->chairfast  Mobility: 3-->slightly limited  Nutrition: 2-->probably inadequate  Friction and Shear: 2-->potential problem  Case Score: 16    Genevieve Mattson RN [facility]   Pager: [phone]  Ph: [phone]

## 2022-08-09 NOTE — PROGRESS NOTES
Transfer        6:44 PM  ---------------------------------------------------------------------  Transferred to/from:   Via: Wheel chair  Reason for transfer:  Low level of care  Family:  Daughter Asha  Belongings: Magazines cell phone, cell phone , clothes  Chart: Sent with pt  Medications: Meds from bin sent with pt    2 RN Skin assessment completed with Regulo LONDON    Temp:  [97.6  F (36.4  C)-99.7  F (37.6  C)] 97.6  F (36.4  C)  Pulse:  [89-98] 90  Resp:  [17-32] 31  BP: (100-116)/(68-98) 102/78  MAP:  [69 mmHg-95 mmHg] 88 mmHg  Arterial Line BP: ()/(60-88) 96/78  FiO2 (%):  [30 %] 30 %  SpO2:  [92 %-100 %] 96 %

## 2022-08-09 NOTE — PHARMACY-VANCOMYCIN DOSING SERVICE
Pharmacy Vancomycin Note  Date of Service 2022  Patient's  1960   61 year old, male    Indication: Bacteremia  Day of Therapy: 7  Current vancomycin regimen:  750 mg IV q12h  Current vancomycin monitoring method: AUC  Current vancomycin therapeutic monitoring goal: 400-600 mg*h/L    Insight[employer] Prediction of Current Vancomycin Regimen  Regimen: 750 mg IV every 12 hours.  Start time: 16:56 on 2022  Exposure target: AUC24 (range)400-600 mg/L.hr   AUC24,ss: 485 mg/L.hr  Probability of AUC24 > 400: 81 %  Ctrough,ss: 13.7 mg/L  Probability of Ctrough,ss > 20: 11 %  Probability of nephrotoxicity (Lodise TAD ): 9 %    Current estimated CrCl = Estimated Creatinine Clearance: 131.6 mL/min (A) (based on SCr of 0.64 mg/dL (L)).    Creatinine for last 3 days  2022:  2:31 AM Creatinine 0.67 mg/dL;  9:57 AM Creatinine 0.67 mg/dL;  5:16 PM Creatinine 0.69 mg/dL  2022:  4:00 AM Creatinine 0.71 mg/dL  2022:  3:41 AM Creatinine 0.64 mg/dL    Recent Vancomycin Levels (past 3 days)  2022:  3:41 AM Vancomycin 20.1 ug/mL    Vancomycin IV Administrations (past 72 hours)                   vancomycin (VANCOCIN) 750 mg in sodium chloride 0.9 % 250 mL intermittent infusion (mg) 750 mg New Bag 22 0601     750 mg New Bag 22 1812     750 mg New Bag  0609     750 mg New Bag 22 1758    vancomycin (VANCOCIN) 750 mg in sodium chloride 0.9 % 250 mL intermittent infusion (mg) 750 mg New Bag 22 0553     750 mg New Bag 22 1743                Nephrotoxins and other renal medications (From now, onward)    Start     Dose/Rate Route Frequency Ordered Stop    22 1800  vancomycin (VANCOCIN) 750 mg in sodium chloride 0.9 % 250 mL intermittent infusion         750 mg  over 60-90 Minutes Intravenous EVERY 12 HOURS 22 1340 22 1759    22 1000  norepinephrine (LEVOPHED) 16 mg in  mL infusion MAX CONC CENTRAL LINE         0.01-0.6 mcg/kg/min × 67.4 kg (Dosing  Weight)  0.6-37.9 mL/hr  Intravenous CONTINUOUS 08/07/22 0955      08/06/22 1600  [Held by provider]  bumetanide (BUMEX) tablet 3 mg        (Held by provider since Sun 8/7/2022 at 1105 by Chris Lawrence MD.Hold Reason: Change in Vitals.)    3 mg Oral 2 TIMES DAILY (Diuretics and Nitrates) 08/06/22 1159      08/05/22 1130  lidocaine 3%, phenylephrine 0.25% solution for irrigation         5 mL Irrigation ONCE 08/05/22 1117      07/30/22 1400  [Held by provider]  captopril (CAPOTEN) tablet 12.5 mg        (Held by provider since Mon 8/8/2022 at 1446 by Chris Lawrence MD.Hold Reason: Change in Vitals.)    12.5 mg Oral 3 TIMES DAILY 07/30/22 1228               Contrast Orders - past 72 hours (72h ago, onward)    None          Interpretation of levels and current regimen:  Vancomycin level is reflective of -600    Has serum creatinine changed greater than 50% in last 72 hours: No    Urine output:  good urine output    Renal Function: Stable    InsightRX Prediction of Planned New Vancomycin Regimen  Regimen: 750 mg IV every 12 hours.  Start time: 09:02 on 08/09/2022  Exposure target: AUC24 (range)400-600 mg/L.hr   AUC24,ss: 453 mg/L.hr  Probability of AUC24 > 400: 80 %  Ctrough,ss: 14.2 mg/L  Probability of Ctrough,ss > 20: 3 %  Probability of nephrotoxicity (Lodise TAD 2009): 9 %      Plan:  1. Continue Current Dose  2. Vancomycin monitoring method: AUC  3. Vancomycin therapeutic monitoring goal: 400-600 mg*h/L  4. Pharmacy will check vancomycin levels as appropriate in 5-7 Days or if renal function changes  5. Serum creatinine levels will be ordered daily for the first week of therapy and at least twice weekly for subsequent weeks.    Marguerite Prado RP

## 2022-08-09 NOTE — PROGRESS NOTES
LVAD Social Work Services Progress Note      Date of Initial Social Work Evaluation: 2022 with admission assessment completed on 2022  Collaborated with: Patient and Dtr-Asha    Data: Dandy was transferred back down to ICU and intubated following a very large nose bleed. He has been extubated and probably ready to transfer up to 6th floor again. He was laying in bed this afternoon, but alert and oriented and able to understand compared to writer's visit with his last week, when it was very difficult to understand his speech. Last week, Son verbalized frustration with lack of patient's progress. He and his Girlfriend went back home to [place] for a few days and the patient's Dtr-Asha and her wife and  came over the weekend and are staying through today-maybe tomorrow.  Intervention: Met with Dandy in his room in ICU. Asha was present. Inquired into questions/concerns and discussed discharge planning process. Explained need for [facility] TCU/ARU and explained difference between TCU and ARU. Also discussed how patient would transport there. Talked about anticipated length of stay at rehab, at the apartment afterward and projected date when he may be able to return to [place]. Emotional support provided and normalized the difficulty in being in the hospital for so long with so many setbacks.  Assessment: Danyd seemed much more oriented than from my visit with him last week. Dtr here with her  and patient was bernabe to get outside with the help of the  nursing staff so he could see his Grand Dtr. Mood appeared optimistic, but at the same time, he verbalized some frustrations. Aware he knows nothing about the VAD. Talked about how education can be done at rehab. Will benefit from ongoing SW intervention for psychosocial support post-VAD.  Education provided by SW: Ongoing SW availability, discharge planning process  Plan:    Discharge Plans in Progress: [facility] TCU/ARU    Barriers to d/c plan: Medical  condition    Follow up Plan: SW will continue to follow for ongoing psychosocial support post-VAD implant and assistance with discharge planning.

## 2022-08-09 NOTE — PROGRESS NOTES
"   08/09/22 1000   General Information   Onset of Illness/Injury or Date of Surgery 06/17/22   Referring Physician Chris Lawrence MD   Patient/Family Therapy Goal Statement (SLP) Pt does not want to eat/drink, but did cooperate with SLP given encouragement.   Pertinent History of Current Problem \"Dandy Sands is a 61 year old male with PMH of lupus, antiphospholipid syndrome, HTN, HLD, HFrEF 2/2 ICM who presented with cardiogenic shock c/b multiorgan failure (JOSE, shock liver) requiring mechanical support with IABP, now s/p HM3 LVAD 7/8/22 by Dr. Zamora with intraoperative course c/b RV failure s/p 29F Protek duo via RIJ. Post op course c/b hemopericardium s/p repeat washout with chest left open. Chest closed 7/13/22 and decannulated from RVAD 7/21. Developed epistaxis on 8/6 night which was packed by ENT. Unfortunately bleeding persisted and there was concern for airway protection on 8/7 AM which prompted intubation and transfer to ICU.\" SLP consulted for swallow eval post extubation.   General Observations Alert, not interested in cooperation but did participate with max encouragement.   Type of Evaluation   Type of Evaluation Swallow Evaluation   Oral Motor   Oral Musculature generally intact;unable to assess due to poor participation/comprehension   Dentition (Oral Motor)   Dentition (Oral Motor) natural dentition;adequate dentition   Facial Symmetry (Oral Motor)   Facial Symmetry (Oral Motor) WNL   Lip Function (Oral Motor)   Lip Range of Motion (Oral Motor) WNL   Tongue Function (Oral Motor)   Tongue ROM (Oral Motor) WNL   Jaw Function (Oral Motor)   Jaw Function (Oral Motor) unable/difficult to assess   Cough/Swallow/Gag Reflex (Oral Motor)   Soft Palate/Velum (Oral Motor) unable/difficult to assess   Volitional Throat Clear/Cough (Oral Motor) WNL   Volitional Swallow (Oral Motor) WNL   Vocal Quality/Secretion Management (Oral Motor)   Vocal Quality (Oral Motor) hoarse   General Swallowing Observations "   Comment, General Swallowing Observations No hx prior to hospital admission. Pt was seen by SLP during this admission for dysphagia where he advanced to a regular texture diet/thin liquids.   Respiratory Support (General Swallowing Observations) oxygen mask  (extubated 8/8/22)   Current Diet/Method of Nutritional Intake (General Swallowing Observations, NIS) NPO   Swallowing Evaluation Clinical swallow evaluation   Clinical Swallow Evaluation   Feeding Assistance set up only required   Clinical Swallow Eval: Thin Liquid Texture Trial   Mode of Presentation, Thin Liquids spoon;straw;fed by clinician;self-fed   Volume of Liquid or Food Presented 6 oz thin H20   Oral Phase of Swallow WFL   Pharyngeal Phase of Swallow intact   Diagnostic Statement No overt signs/symptoms of aspiration   Clinical Swallow Evaluation: Puree Solid Texture Trial   Mode of Presentation, Puree spoon;self-fed   Volume of Puree Presented 3 oz pudding   Oral Phase, Puree WFL   Pharyngeal Phase, Puree intact   Diagnostic Statement WFL with puree. Pt admantly refused advanced solids.   Clinical Swallow Evaluation: Solid Food Texture Trial   Diagnostic Statement Pt adamantly refused solid textures. States the only food he wants to eat is soup.   Esophageal Phase of Swallow   Patient reports or presents with symptoms of esophageal dysphagia No   Swallowing Recommendations   Diet Consistency Recommendations thin liquids (level 0);regular diet   Supervision Level for Intake close supervision needed   Mode of Delivery Recommendations bolus size, small;slow rate of intake   Swallowing Maneuver Recommendations alternate food and liquid intake   Recommended Feeding/Eating Techniques (Swallow Eval) maintain upright sitting position for eating   Medication Administration Recommendations, Swallowing (SLP) ok for one pill at a time with h20 or whole in puree   Instrumental Assessment Recommendations instrumental evaluation not recommended at this time    General Therapy Interventions   Planned Therapy Interventions Dysphagia Treatment   Dysphagia treatment Instruction of safe swallow strategies   Clinical Impression   Criteria for Skilled Therapeutic Interventions Met (SLP Eval) Yes, treatment indicated   SLP Diagnosis mild oropharyngeal dysphagia   Risks & Benefits of therapy have been explained evaluation/treatment results reviewed;participants voiced agreement with care plan;participants included;care plan/treatment goals reviewed   Clinical Impression Comments Clinical dysphagia exam completed per MD order. The patient has low interest in PO given discomfort and reduced taste/smell related to nasal packing. Per this assessment the patient demonstrates WFL oropharyngeal swallowing mechanism to resume prior diet level: regular textures (IDDSI 7) and thin liquids (0). Nasal packing can result in reduced coordination with eating/breathing, so recommend selection of softer foods, small bites/sips and slow pacing with PO. SLP will follow for dysphagia intervention to include diet tolerance and safe swallow education.   SLP Discharge Planning   SLP Discharge Recommendation Transitional Care Facility   SLP Rationale for DC Rec Expect swallow goals to be met prior to hospital discharge. SLP will monitor for voice deficits with the expectation this will improve spontaneously; if not outpatient SLP tx can be pursued.   SLP Brief overview of current status  Recommend selection of soft foods from a regular texture diet and thin liquids. Due to nasal packing and reduced coordination pt benefits from small bites/sips at a slow pace.    Total Evaluation Time   Total Evaluation Time (Minutes) 15   SLP Goals   Therapy Frequency (SLP Eval) 4 times/wk   SLP Predicted Duration/Target Date for Goal Attainment 09/09/22   SLP Goals Swallow   SLP: Safely tolerate diet without signs/symptoms of aspiration Regular diet;Thin liquids;With use of swallow precautions;With  assistance/supervision   Psychosocial Support   Trust Relationship/Rapport care explained;choices provided;questions answered;thoughts/feelings acknowledged

## 2022-08-09 NOTE — PROGRESS NOTES
[facility]   Cards 2 - Progress Note    Dandy Sands MRN: [id number]  Age: 61 year old, : 1960  Date: 2022      Assessment and Plan:  Dandy Sands is a 61 year old male with PMH of lupus, antiphospholipid syndrome, HTN, HLD, HFrEF 2/2 ICM who presented with cardiogenic shock c/b multiorgan failure (JOSE, shock liver) requiring mechanical support with IABP, now s/p HM3 LVAD 22 by Dr. Zamora with intraoperative course c/b RV failure s/p 29F Protek duo via RIJ. Post op course c/b hemopericardium s/p repeat washout with chest left open. Chest closed 22 and decannulated from RVAD . Developed epistaxis on  night which was packed by ENT. Unfortunately bleeding persisted and there was concern for airway protection on  AM which prompted intubation and transfer to ICU.     Changes today:  -Remove marcelo today  -Start Hydralazine 10mg TID for afterload reduction  -Cleared for transfer to floor. Orders in.  -Continue to hold warfarin/ASA. Will consider starting ASA tomorrow and possibly heparin in coming days    Neurology  #Insomnia/Delirium  Some concern that he had a fall overnight on . Discussed with nursing staff and he did NOT have a fall, although he did try to get out of bed. CT head was obtained () and it was unremarkable.  -Continue Olanzapine, Sertraline and Quetiapine per previous Psych recommendations    Cardiovascular  #HFrEF 2/2 ICM s/p HM3 LVAD in setting of cardiogenic shock (SCAI D)  #RV failure s/p Protek duo temporary RVAD s/p decannulation   #RV thrombus  #Post chest closure hemopericardium s/p repeat washout ()  #PMH CAD s/p PCI to LAD ()  #S/p CRT-D ()  Patient with ICM s/p HM3 LVAD 22 by Dr. Zamora in setting of presentation for cardiogenic shock requiring MCS with IABP as prior to HM (initially evaluated for heart transplant, however noted to have substantially elevated DSAs during course of care, so decision made  to proceed with LVAD given patient already on temporary MCS). Intraoperative course c/b RV failure resulting in cardiogenic shock requiring high dose pressors necessitating RVAD support. Initial post-op course c/b hemopericardium requiring repeat washout with chest left open, however has since recovered well with decannulation on 7/21 with extubation day prior. Has continued to tolerate well from hemodynamic and end organ standpoint.   -LVAD: continues to have good flows, no alarms and stable end organ perfusion; continue speed at 5300 rpm  -continue digoxin for RV support  -AC, antiplatelets: ASA/warfarin on hold due to epistaxis. Will consider starting heparin for anticoagulation challenge in coming days  -Volume status: Holding bumex 3mg PO BID.   -of note, patient with climbing capture threshold of RV lead; will need to be evaluated in future by EP team  -blood pressure: Continue to hold Captopril 12.5mg TID for now. Start Hydralazine 10mg TID    Pulmonary  #Epistaxis  #Intubated due to Concern for airway protection (8/7)-Extubated (8/8)  #Mild-moderate emphysema  #History of COVID-19  #Iatrogenic R apical PTX  H/o nasal perforation that required elective procedure (in [place]) which was postponed due to LVAD insertion. Developed epistaxis not fixed by packing overnight on 8/6 and pt continued to bleed. Intubated due to concern for ability to maintain airway from bleeding. Extubated 8/8  -Appreciate ENT assistance in managing epistaxis. Plan to keep Merocels for 3 days, to be removed by ENT (tomorrow)  -Cefepime/Vanc empirically for broad coverage for PNA (8/7-8/12)  -Continue Breo-Ellipta    ENT:  #Epistaxis  #Dysphonia  Controlled at bedside by ENT s/p cautery and packing. Appreciate assistance. ENT will discuss long-term plan regarding septal perforation with pt post extubation.  -Management per ENT    Gastrointestinal, Nutrition  #GERD  #Congestive hepatopathy in the setting of cardiac insuffiencey -  Resolved  #Hematemesis / oral mucosal bleeding -resolved    #Dysphagia  GI consulted 7/31 for black tarry stools and a possible GI bleed, however it is more likely swallowed blood from epistaxis that patient previously had. GI did not recommend an upper endoscopy. Recurrence of bloody stools likely due to epistaxis which have resolved. Will monitor Hb.  -Protonix 40mg IV BID  -Cleared for regular diet by SLP    Renal, Electrolytes  - Strict I/O, daily weights   - Avoid/limit nephrotoxins as able  - Replete lytes PRN per protocol  - Holding bumex today    Infectious Disease  #E.faecalis on culture from PICC line (8/2/22) - 1/2 tubes in 1/4 sets collected 8/2  #S.epidermidis (MSSE) on culture from PICC line (8/2/22) - 1/2 tubes in 1/4 sets collected 8/2  Vanc was supposed to end on 8/7 but given massive epistaxis will start broad spectrum antibiotics  -Cefepime/Vanc as above (since 8/7)    Hematology  #Anemia due to blood loss from Epistaxis-Resolved  #History of DVT and PE   #Antiphospholipid antibody syndrome  #History of iron deficiency anemia  -Hemoglobin check q6H  -Transfuse to keep Hb>7  -Warfarin and ASA on hold.    Endocrinology  SSI    Rheumatology:  #SLE  - PTA meds: hydroxychloroquine 200mg, resumed 7/16  - Will need cellcept restarted when appropriate    Pertinent Lines  R Fem arterial line  8/7    ICU Cares:  Feeds: On PO diet  DVT Prophylaxis: On hold given epistaxis  GI Prophylaxis: PPI  Bowel Regimen: On hold given epistaxis and bloody stools    Patient seen and discussed with Dr. Stewart.  Assessment and plan as above.    Chris Lawrence MD  Cardiology Fellow      Subjective:  Reviewed events from last 24 hours.  No acute events overnight.  No new symptoms.    Objective Findings:  Temp:  [97.7  F (36.5  C)-99.7  F (37.6  C)] 97.7  F (36.5  C)  Pulse:  [76-98] 96  Resp:  [17-32] 25  BP: (116)/(98) 116/98  MAP:  [69 mmHg-95 mmHg] 88 mmHg  Arterial Line BP: ()/(60-88) 96/78  FiO2 (%):  [30 %] 30  %  SpO2:  [92 %-100 %] 100 %    Physical Exam:  GEN: Frail  HEENT: Nasal Packing present  Pulm: Coarse breath sounds bilaterally  Cardiac: LVAD hum +  GI: soft, non distended  Neuro: Sedated  Vascular: No lower extremity edema    Medications:    acetaminophen  975 mg Oral or Feeding Tube Q8H CAMMY     [Held by provider] aspirin  81 mg Oral or Feeding Tube Daily     [Held by provider] atorvastatin  40 mg Oral QPM     [Held by provider] bumetanide  3 mg Oral BID     [Held by provider] captopril  12.5 mg Oral TID     ceFEPIme (MAXIPIME) IV  2 g Intravenous Q8H     digoxin  125 mcg Oral Daily     fluticasone-vilanterol  1 puff Inhalation Daily     hydrALAZINE  10 mg Oral Q8H CAMMY     hydroxychloroquine  200 mg Oral BID     insulin aspart  1-12 Units Subcutaneous Q4H     lidocaine  1 patch Transdermal Q24h    And     lidocaine   Transdermal Q8H CAMMY     melatonin  10 mg Oral At Bedtime     multivitamin, therapeutic  1 tablet Oral Daily     nystatin  1,000,000 Units Swish & Swallow 4x Daily     OLANZapine  5 mg Oral BID     oxymetazoline  2 spray Both Nostrils BID     pantoprazole (PROTONIX) IV  40 mg Intravenous BID     QUEtiapine  100 mg Oral or Feeding Tube At Bedtime     QUEtiapine  25 mg Oral BID     sertraline  50 mg Oral Daily     sodium chloride  2 spray Both Nostrils Q4H     vancomycin  750 mg Intravenous Q12H     [Held by provider] zolpidem  5 mg Oral At Bedtime       dexmedetomidine Stopped (08/08/22 1520)     dextrose Stopped (08/09/22 1430)     norepinephrine Stopped (08/08/22 1508)       Labs:   CMP  Recent Labs   Lab 08/09/22  1418 08/09/22  1232 08/09/22  1057 08/09/22  0751 08/09/22  0353 08/09/22  0341 08/08/22  0403 08/08/22  0400 08/07/22  2028 08/07/22  1716 08/07/22  1013 08/07/22  0957 08/07/22  0307 08/07/22  0231 08/06/22  0757 08/06/22  0444   NA  --   --   --   --   --  134*  --  133*  --  135*  --  133*  --  133*  --  131*   POTASSIUM  --   --   --   --   --  3.7  --  4.3  --  4.9  --  5.6*  --   4.6  --  3.8   CHLORIDE  --   --   --   --   --  103  --  100  --  101  --  99  --  95*  --  93*   CO2  --   --   --   --   --  23  --  26  --  26  --  25  --  28  --  29   ANIONGAP  --   --   --   --   --  8  --  7  --  8  --  9  --  10  --  9   * 118* 118* 113*   < > 96   < > 108*   < > 100*   < > 118*   < > 115*   < > 129*   BUN  --   --   --   --   --  19.1  --  28.0*  --  30.1*  --  31.5*  --  26.3*  --  28.1*   CR  --   --   --   --   --  0.64*  --  0.71  --  0.69  --  0.67  --  0.67  --  0.72   GFRESTIMATED  --   --   --   --   --  >90  --  >90  --  >90  --  >90  --  >90  --  >90   ELDA  --   --   --   --   --  8.8  --  8.8  --  8.6*  --  8.1*  --  8.6*  --  8.5*   MAG  --   --   --   --   --  2.3  --  1.9  --   --   --   --   --  1.8  --  1.8   PHOS  --   --   --   --   --  4.3  --  3.5  --   --   --   --   --  4.2  --  4.7*   PROTTOTAL  --   --   --   --   --  5.8*  --  5.9*  --  6.0*  --  6.1*  --  6.7  --  6.8   ALBUMIN  --   --   --   --   --  2.9*  --  2.7*  --  2.9*  --  3.1*  --  3.1*  --  3.0*   BILITOTAL  --   --   --   --   --  0.6  --  0.6  --  0.8  --  0.6  --  0.4  --  0.4   ALKPHOS  --   --   --   --   --  129  --  144*  --  150*  --  166*  --  214*  --  203*   AST  --   --   --   --   --  27  --  42  --  40  --  51*  --  31  --  34   ALT  --   --   --   --   --  20  --  25  --  20  --  21  --  20  --  19    < > = values in this interval not displayed.     CBC  Recent Labs   Lab 08/09/22  0341 08/08/22  1947 08/08/22  1153 08/08/22  0400 08/07/22  1327 08/07/22  0957 08/07/22  0754 08/07/22  0231   WBC 8.7  --   --  9.2  --  17.0*  --  12.1*   RBC 2.92*  --   --  3.19*  --  2.92*  --  2.95*   HGB 8.5* 9.0* 8.7* 9.3*   < > 8.4*   < > 8.5*   HCT 26.3*  --   --  28.2*  --  26.6*  --  27.4*   MCV 90  --   --  88  --  91  --  93   MCH 29.1  --   --  29.2  --  28.8  --  28.8   MCHC 32.3  --   --  33.0  --  31.6  --  31.0*   RDW 16.6*  --   --  16.1*  --  16.2*  --  16.6*     --   --  281   --  314  --  479*    < > = values in this interval not displayed.     Arterial Blood Gas  Recent Labs   Lab 08/08/22  1607 08/08/22  1333 08/08/22  1014 08/08/22  0359   PH 7.42 7.46* 7.47* 7.44   PCO2 41 37 37 40   PO2 88 87 118* 201*   HCO3 26 26 27 28   O2PER 2 40 40 50

## 2022-08-10 ENCOUNTER — APPOINTMENT (OUTPATIENT)
Dept: SPEECH THERAPY | Facility: CLINIC | Age: 62
DRG: 001 | End: 2022-08-10
Attending: INTERNAL MEDICINE
Payer: COMMERCIAL

## 2022-08-10 LAB
ALBUMIN SERPL BCG-MCNC: 3.1 G/DL (ref 3.5–5.2)
ALP SERPL-CCNC: 129 U/L (ref 40–129)
ALT SERPL W P-5'-P-CCNC: 14 U/L (ref 10–50)
ANION GAP SERPL CALCULATED.3IONS-SCNC: 10 MMOL/L (ref 7–15)
AST SERPL W P-5'-P-CCNC: 25 U/L (ref 10–50)
ATRIAL RATE - MUSE: 37 BPM
BILIRUB SERPL-MCNC: 0.7 MG/DL
BUN SERPL-MCNC: 13.2 MG/DL (ref 8–23)
CA-I BLD-MCNC: 4.9 MG/DL (ref 4.4–5.2)
CALCIUM SERPL-MCNC: 9.4 MG/DL (ref 8.8–10.2)
CHLORIDE SERPL-SCNC: 104 MMOL/L (ref 98–107)
CREAT SERPL-MCNC: 0.61 MG/DL (ref 0.67–1.17)
DEPRECATED HCO3 PLAS-SCNC: 22 MMOL/L (ref 22–29)
DIASTOLIC BLOOD PRESSURE - MUSE: NORMAL MMHG
ERYTHROCYTE [DISTWIDTH] IN BLOOD BY AUTOMATED COUNT: 17.1 % (ref 10–15)
ERYTHROCYTE [DISTWIDTH] IN BLOOD BY AUTOMATED COUNT: 17.3 % (ref 10–15)
GFR SERPL CREATININE-BSD FRML MDRD: >90 ML/MIN/1.73M2
GLUCOSE BLDC GLUCOMTR-MCNC: 115 MG/DL (ref 70–99)
GLUCOSE BLDC GLUCOMTR-MCNC: 86 MG/DL (ref 70–99)
GLUCOSE BLDC GLUCOMTR-MCNC: 88 MG/DL (ref 70–99)
GLUCOSE BLDC GLUCOMTR-MCNC: 90 MG/DL (ref 70–99)
GLUCOSE BLDC GLUCOMTR-MCNC: 92 MG/DL (ref 70–99)
GLUCOSE BLDC GLUCOMTR-MCNC: 95 MG/DL (ref 70–99)
GLUCOSE SERPL-MCNC: 93 MG/DL (ref 70–99)
HCT VFR BLD AUTO: 29 % (ref 40–53)
HCT VFR BLD AUTO: 30.1 % (ref 40–53)
HGB BLD-MCNC: 9.3 G/DL (ref 13.3–17.7)
HGB BLD-MCNC: 9.3 G/DL (ref 13.3–17.7)
HGB BLD-MCNC: 9.6 G/DL (ref 13.3–17.7)
HGB BLD-MCNC: 9.6 G/DL (ref 13.3–17.7)
INR PPP: 1.72 (ref 0.85–1.15)
INTERPRETATION ECG - MUSE: NORMAL
LACTATE SERPL-SCNC: 0.8 MMOL/L (ref 0.7–2)
MAGNESIUM SERPL-MCNC: 2.5 MG/DL (ref 1.7–2.3)
MCH RBC QN AUTO: 29.5 PG (ref 26.5–33)
MCH RBC QN AUTO: 29.6 PG (ref 26.5–33)
MCHC RBC AUTO-ENTMCNC: 31.9 G/DL (ref 31.5–36.5)
MCHC RBC AUTO-ENTMCNC: 32.1 G/DL (ref 31.5–36.5)
MCV RBC AUTO: 92 FL (ref 78–100)
MCV RBC AUTO: 93 FL (ref 78–100)
P AXIS - MUSE: NORMAL DEGREES
PHOSPHATE SERPL-MCNC: 4 MG/DL (ref 2.5–4.5)
PLATELET # BLD AUTO: 266 10E3/UL (ref 150–450)
PLATELET # BLD AUTO: 288 10E3/UL (ref 150–450)
POTASSIUM SERPL-SCNC: 3.8 MMOL/L (ref 3.4–5.3)
PR INTERVAL - MUSE: NORMAL MS
PROT SERPL-MCNC: 6.4 G/DL (ref 6.4–8.3)
QRS DURATION - MUSE: 162 MS
QT - MUSE: 438 MS
QTC - MUSE: 589 MS
R AXIS - MUSE: 212 DEGREES
RBC # BLD AUTO: 3.14 10E6/UL (ref 4.4–5.9)
RBC # BLD AUTO: 3.25 10E6/UL (ref 4.4–5.9)
SODIUM SERPL-SCNC: 136 MMOL/L (ref 136–145)
SYSTOLIC BLOOD PRESSURE - MUSE: NORMAL MMHG
T AXIS - MUSE: 121 DEGREES
VENTRICULAR RATE- MUSE: 109 BPM
WBC # BLD AUTO: 8.9 10E3/UL (ref 4–11)
WBC # BLD AUTO: 9.1 10E3/UL (ref 4–11)

## 2022-08-10 PROCEDURE — 250N000011 HC RX IP 250 OP 636: Performed by: STUDENT IN AN ORGANIZED HEALTH CARE EDUCATION/TRAINING PROGRAM

## 2022-08-10 PROCEDURE — 85610 PROTHROMBIN TIME: CPT | Performed by: SURGERY

## 2022-08-10 PROCEDURE — 250N000013 HC RX MED GY IP 250 OP 250 PS 637: Performed by: PHYSICIAN ASSISTANT

## 2022-08-10 PROCEDURE — 214N000001 HC R&B CCU UMMC

## 2022-08-10 PROCEDURE — 250N000013 HC RX MED GY IP 250 OP 250 PS 637: Performed by: SURGERY

## 2022-08-10 PROCEDURE — 93005 ELECTROCARDIOGRAM TRACING: CPT

## 2022-08-10 PROCEDURE — 85027 COMPLETE CBC AUTOMATED: CPT | Performed by: SURGERY

## 2022-08-10 PROCEDURE — 250N000013 HC RX MED GY IP 250 OP 250 PS 637: Performed by: STUDENT IN AN ORGANIZED HEALTH CARE EDUCATION/TRAINING PROGRAM

## 2022-08-10 PROCEDURE — 93010 ELECTROCARDIOGRAM REPORT: CPT | Performed by: INTERNAL MEDICINE

## 2022-08-10 PROCEDURE — C9113 INJ PANTOPRAZOLE SODIUM, VIA: HCPCS | Performed by: STUDENT IN AN ORGANIZED HEALTH CARE EDUCATION/TRAINING PROGRAM

## 2022-08-10 PROCEDURE — 999N000128 HC STATISTIC PERIPHERAL IV START W/O US GUIDANCE

## 2022-08-10 PROCEDURE — 250N000011 HC RX IP 250 OP 636: Performed by: SURGERY

## 2022-08-10 PROCEDURE — 99233 SBSQ HOSP IP/OBS HIGH 50: CPT | Mod: 25 | Performed by: INTERNAL MEDICINE

## 2022-08-10 PROCEDURE — 36415 COLL VENOUS BLD VENIPUNCTURE: CPT | Performed by: STUDENT IN AN ORGANIZED HEALTH CARE EDUCATION/TRAINING PROGRAM

## 2022-08-10 PROCEDURE — 92526 ORAL FUNCTION THERAPY: CPT | Mod: GN

## 2022-08-10 PROCEDURE — 83605 ASSAY OF LACTIC ACID: CPT | Performed by: SURGERY

## 2022-08-10 PROCEDURE — 99232 SBSQ HOSP IP/OBS MODERATE 35: CPT | Performed by: PSYCHIATRY & NEUROLOGY

## 2022-08-10 PROCEDURE — 258N000003 HC RX IP 258 OP 636: Performed by: STUDENT IN AN ORGANIZED HEALTH CARE EDUCATION/TRAINING PROGRAM

## 2022-08-10 PROCEDURE — 80053 COMPREHEN METABOLIC PANEL: CPT | Performed by: SURGERY

## 2022-08-10 PROCEDURE — 93750 INTERROGATION VAD IN PERSON: CPT | Performed by: INTERNAL MEDICINE

## 2022-08-10 PROCEDURE — 85027 COMPLETE CBC AUTOMATED: CPT | Performed by: STUDENT IN AN ORGANIZED HEALTH CARE EDUCATION/TRAINING PROGRAM

## 2022-08-10 PROCEDURE — 250N000009 HC RX 250: Performed by: STUDENT IN AN ORGANIZED HEALTH CARE EDUCATION/TRAINING PROGRAM

## 2022-08-10 PROCEDURE — 999N000157 HC STATISTIC RCP TIME EA 10 MIN

## 2022-08-10 PROCEDURE — 83735 ASSAY OF MAGNESIUM: CPT | Performed by: INTERNAL MEDICINE

## 2022-08-10 PROCEDURE — 82330 ASSAY OF CALCIUM: CPT | Performed by: SURGERY

## 2022-08-10 PROCEDURE — 84100 ASSAY OF PHOSPHORUS: CPT | Performed by: SURGERY

## 2022-08-10 PROCEDURE — 999N000127 HC STATISTIC PERIPHERAL IV START W US GUIDANCE

## 2022-08-10 PROCEDURE — 36415 COLL VENOUS BLD VENIPUNCTURE: CPT | Performed by: SURGERY

## 2022-08-10 PROCEDURE — 250N000013 HC RX MED GY IP 250 OP 250 PS 637: Performed by: INTERNAL MEDICINE

## 2022-08-10 RX ORDER — HEPARIN SODIUM 10000 [USP'U]/100ML
500 INJECTION, SOLUTION INTRAVENOUS CONTINUOUS
Status: DISCONTINUED | OUTPATIENT
Start: 2022-08-10 | End: 2022-08-11

## 2022-08-10 RX ORDER — KETOCONAZOLE 20 MG/G
CREAM TOPICAL DAILY
Status: DISCONTINUED | OUTPATIENT
Start: 2022-08-10 | End: 2022-08-20

## 2022-08-10 RX ORDER — HYDROXYZINE HYDROCHLORIDE 10 MG/1
10 TABLET, FILM COATED ORAL 3 TIMES DAILY PRN
Status: DISCONTINUED | OUTPATIENT
Start: 2022-08-10 | End: 2022-08-21 | Stop reason: HOSPADM

## 2022-08-10 RX ORDER — QUETIAPINE FUMARATE 50 MG/1
150 TABLET, FILM COATED ORAL AT BEDTIME
Status: DISCONTINUED | OUTPATIENT
Start: 2022-08-10 | End: 2022-08-21 | Stop reason: HOSPADM

## 2022-08-10 RX ORDER — SERTRALINE HYDROCHLORIDE 100 MG/1
100 TABLET, FILM COATED ORAL DAILY
Status: DISCONTINUED | OUTPATIENT
Start: 2022-08-11 | End: 2022-08-21 | Stop reason: HOSPADM

## 2022-08-10 RX ADMIN — VANCOMYCIN HYDROCHLORIDE 750 MG: 10 INJECTION, POWDER, LYOPHILIZED, FOR SOLUTION INTRAVENOUS at 18:57

## 2022-08-10 RX ADMIN — DIGOXIN 125 MCG: 125 TABLET ORAL at 08:11

## 2022-08-10 RX ADMIN — LIDOCAINE PATCH 4% 1 PATCH: 40 PATCH TOPICAL at 08:21

## 2022-08-10 RX ADMIN — SALINE NASAL SPRAY 2 SPRAY: 1.5 SOLUTION NASAL at 00:13

## 2022-08-10 RX ADMIN — HYDRALAZINE HYDROCHLORIDE 10 MG: 10 TABLET, FILM COATED ORAL at 13:50

## 2022-08-10 RX ADMIN — CEFEPIME HYDROCHLORIDE 2 G: 2 INJECTION, POWDER, FOR SOLUTION INTRAVENOUS at 03:59

## 2022-08-10 RX ADMIN — CEFEPIME HYDROCHLORIDE 2 G: 2 INJECTION, POWDER, FOR SOLUTION INTRAVENOUS at 21:28

## 2022-08-10 RX ADMIN — PANTOPRAZOLE SODIUM 40 MG: 40 INJECTION, POWDER, FOR SOLUTION INTRAVENOUS at 08:07

## 2022-08-10 RX ADMIN — HYDROXYZINE HYDROCHLORIDE 10 MG: 10 TABLET ORAL at 04:09

## 2022-08-10 RX ADMIN — PANTOPRAZOLE SODIUM 40 MG: 40 INJECTION, POWDER, FOR SOLUTION INTRAVENOUS at 20:01

## 2022-08-10 RX ADMIN — OLANZAPINE 5 MG: 5 TABLET, FILM COATED ORAL at 08:10

## 2022-08-10 RX ADMIN — NYSTATIN 1000000 UNITS: 100000 SUSPENSION ORAL at 08:11

## 2022-08-10 RX ADMIN — PROCHLORPERAZINE EDISYLATE 10 MG: 5 INJECTION INTRAMUSCULAR; INTRAVENOUS at 08:21

## 2022-08-10 RX ADMIN — LIDOCAINE HYDROCHLORIDE 30 ML: 20 SOLUTION ORAL; TOPICAL at 13:56

## 2022-08-10 RX ADMIN — QUETIAPINE FUMARATE 150 MG: 50 TABLET ORAL at 21:23

## 2022-08-10 RX ADMIN — NYSTATIN 1000000 UNITS: 100000 SUSPENSION ORAL at 16:05

## 2022-08-10 RX ADMIN — SERTRALINE HYDROCHLORIDE 50 MG: 50 TABLET ORAL at 08:11

## 2022-08-10 RX ADMIN — OXYCODONE HYDROCHLORIDE 5 MG: 5 TABLET ORAL at 20:01

## 2022-08-10 RX ADMIN — SALINE NASAL SPRAY 2 SPRAY: 1.5 SOLUTION NASAL at 11:57

## 2022-08-10 RX ADMIN — HYDROXYCHLOROQUINE SULFATE 200 MG: 200 TABLET, FILM COATED ORAL at 08:11

## 2022-08-10 RX ADMIN — HYDROXYCHLOROQUINE SULFATE 200 MG: 200 TABLET, FILM COATED ORAL at 20:01

## 2022-08-10 RX ADMIN — SALINE NASAL SPRAY 2 SPRAY: 1.5 SOLUTION NASAL at 20:10

## 2022-08-10 RX ADMIN — ACETAMINOPHEN 325 MG: 325 TABLET, FILM COATED ORAL at 13:48

## 2022-08-10 RX ADMIN — ONDANSETRON 4 MG: 2 INJECTION INTRAMUSCULAR; INTRAVENOUS at 07:58

## 2022-08-10 RX ADMIN — Medication 6.25 MG: at 20:00

## 2022-08-10 RX ADMIN — Medication 10 MG: at 21:24

## 2022-08-10 RX ADMIN — QUETIAPINE FUMARATE 25 MG: 25 TABLET ORAL at 16:05

## 2022-08-10 RX ADMIN — HYDRALAZINE HYDROCHLORIDE 10 MG: 10 TABLET, FILM COATED ORAL at 21:24

## 2022-08-10 RX ADMIN — QUETIAPINE FUMARATE 25 MG: 25 TABLET ORAL at 08:12

## 2022-08-10 RX ADMIN — HYDROXYZINE HYDROCHLORIDE 10 MG: 10 TABLET ORAL at 15:41

## 2022-08-10 RX ADMIN — ACETAMINOPHEN 975 MG: 325 TABLET, FILM COATED ORAL at 05:21

## 2022-08-10 RX ADMIN — QUETIAPINE FUMARATE 50 MG: 50 TABLET ORAL at 01:45

## 2022-08-10 RX ADMIN — ACETAMINOPHEN 975 MG: 325 TABLET, FILM COATED ORAL at 21:24

## 2022-08-10 RX ADMIN — HEPARIN SODIUM 500 UNITS/HR: 10000 INJECTION, SOLUTION INTRAVENOUS at 16:38

## 2022-08-10 RX ADMIN — THERA TABS 1 TABLET: TAB at 08:10

## 2022-08-10 RX ADMIN — LIDOCAINE HYDROCHLORIDE 30 ML: 20 SOLUTION ORAL; TOPICAL at 09:51

## 2022-08-10 RX ADMIN — HYDRALAZINE HYDROCHLORIDE 20 MG: 20 INJECTION INTRAMUSCULAR; INTRAVENOUS at 11:56

## 2022-08-10 RX ADMIN — HYDROXYZINE HYDROCHLORIDE 10 MG: 10 TABLET ORAL at 09:33

## 2022-08-10 RX ADMIN — KETOCONAZOLE: 20 CREAM TOPICAL at 16:06

## 2022-08-10 RX ADMIN — VANCOMYCIN HYDROCHLORIDE 750 MG: 10 INJECTION, POWDER, LYOPHILIZED, FOR SOLUTION INTRAVENOUS at 06:13

## 2022-08-10 RX ADMIN — HYDROMORPHONE HYDROCHLORIDE 0.2 MG: 0.2 INJECTION, SOLUTION INTRAMUSCULAR; INTRAVENOUS; SUBCUTANEOUS at 12:49

## 2022-08-10 RX ADMIN — CEFEPIME HYDROCHLORIDE 2 G: 2 INJECTION, POWDER, FOR SOLUTION INTRAVENOUS at 11:56

## 2022-08-10 RX ADMIN — HYDRALAZINE HYDROCHLORIDE 10 MG: 10 TABLET, FILM COATED ORAL at 05:21

## 2022-08-10 RX ADMIN — OXYCODONE HYDROCHLORIDE 5 MG: 5 TABLET ORAL at 05:21

## 2022-08-10 RX ADMIN — FLUTICASONE FUROATE AND VILANTEROL TRIFENATATE 1 PUFF: 100; 25 POWDER RESPIRATORY (INHALATION) at 08:12

## 2022-08-10 RX ADMIN — SALINE NASAL SPRAY 2 SPRAY: 1.5 SOLUTION NASAL at 15:52

## 2022-08-10 RX ADMIN — NYSTATIN 1000000 UNITS: 100000 SUSPENSION ORAL at 11:57

## 2022-08-10 RX ADMIN — OXYCODONE HYDROCHLORIDE 5 MG: 5 TABLET ORAL at 13:08

## 2022-08-10 RX ADMIN — SALINE NASAL SPRAY 2 SPRAY: 1.5 SOLUTION NASAL at 08:11

## 2022-08-10 RX ADMIN — SALINE NASAL SPRAY 2 SPRAY: 1.5 SOLUTION NASAL at 04:06

## 2022-08-10 RX ADMIN — OXYCODONE HYDROCHLORIDE 5 MG: 5 TABLET ORAL at 09:16

## 2022-08-10 RX ADMIN — NYSTATIN 1000000 UNITS: 100000 SUSPENSION ORAL at 20:00

## 2022-08-10 ASSESSMENT — ACTIVITIES OF DAILY LIVING (ADL)
ADLS_ACUITY_SCORE: 36
ADLS_ACUITY_SCORE: 38
ADLS_ACUITY_SCORE: 33
ADLS_ACUITY_SCORE: 38
ADLS_ACUITY_SCORE: 33

## 2022-08-10 NOTE — PROGRESS NOTES
D: Stopped by to see patient. Pt reports anxious to get started w/ VAD Education.  Pt w/ c/o back pain that makes it difficult for him to move.  I: Discussed POC andprovided support and listened to patient's thoughts and concerns.    P: Continue to follow patient and address any questions or concerns patient and or caregiver may have.  Will touch base w/ pt's daughter on Friday to assess appropriateness for education.

## 2022-08-10 NOTE — PROGRESS NOTES
[facility]   Cards 2 - Progress Note    Dandy Sands MRN: [id number]  Age: 61 year old, : 1960  Date: 2022      Assessment and Plan:  Dandy Sands is a 61 year old male with PMH of lupus, antiphospholipid syndrome, HTN, HLD, HFrEF 2/2 ICM who presented with cardiogenic shock c/b multiorgan failure (JOSE, shock liver) requiring mechanical support with IABP, now s/p HM3 LVAD 22 by Dr. Zamora with intraoperative course c/b RV failure s/p 29F Protek duo via RIJ. Post op course c/b hemopericardium s/p repeat washout with chest left open. Chest closed 22 and decannulated from RVAD . Developed epistaxis on  night which was packed by ENT. Unfortunately bleeding persisted and there was concern for airway protection on  AM which prompted intubation and transfer to ICU. Patient has since been transferred to the floor and removed his own nasal packing in the evening .     Changes today:  -Monitor for epistaxis after patient removed his own nasal packing last night  -C/t Hydralazine 10mg TID for afterload reduction  -Given elevated MAPs, start Captopril @ 6.25mg TID today  -Psychiatry consult to address QT prolonging meds  -Restart Heparin at 500U/hr; hold low-intensity heparin gtt and ASA while monitoring for epistaxis  -Hold Bumex; euvolemic on exam    Neurology  #Insomnia/Delirium  Some concern that he had a fall overnight on . Discussed with nursing staff and he did NOT have a fall, although he did try to get out of bed. CT head was obtained () and it was unremarkable.  -Per psych recs:   - discontinue olanzapine   - Sertraline and Quetiapine per today's psych recs    Cardiovascular  #HFrEF 2/2 ICM s/p HM3 LVAD in setting of cardiogenic shock (SCAI D)  #RV failure s/p Protek duo temporary RVAD s/p decannulation   #RV thrombus  #Post chest closure hemopericardium s/p repeat washout ()  #PMH CAD s/p PCI to LAD ()  #S/p CRT-D ()  Patient  with ICM s/p HM3 LVAD 7/8/22 by Dr. Zamora in setting of presentation for cardiogenic shock requiring MCS with IABP as prior to HM (initially evaluated for heart transplant, however noted to have substantially elevated DSAs during course of care, so decision made to proceed with LVAD given patient already on temporary MCS). Intraoperative course c/b RV failure resulting in cardiogenic shock requiring high dose pressors necessitating RVAD support. Initial post-op course c/b hemopericardium requiring repeat washout with chest left open, however has since recovered well with decannulation on 7/21 with extubation day prior. Has continued to tolerate well from hemodynamic and end organ standpoint.   -LVAD: continues to have good flows, no alarms and stable end organ perfusion; continue speed at 5300 rpm  -continue digoxin for RV support  -AC, antiplatelets: Restart Heparin at 500U/hr; hold low-intensity heparin gtt and ASA while monitoring for epistaxis  -Volume status: euvolemic, hold Bumex for now  -Of note, patient with climbing capture threshold of RV lead; will need to be evaluated in future by EP team  -blood pressure: Continue Hydralazine 10mg TID. Start Captopril 6.25mg TID    Pulmonary  #Epistaxis  #Intubated due to Concern for airway protection (8/7)-Extubated (8/8)  #Mild-moderate emphysema  #History of COVID-19  #Iatrogenic R apical PTX  H/o nasal perforation that required elective procedure (in [place]) which was postponed due to LVAD insertion. Developed epistaxis not fixed by packing overnight on 8/6 and pt continued to bleed. Intubated due to concern for ability to maintain airway from bleeding. Extubated 8/8  -Appreciate ENT assistance in managing epistaxis. Patient removed his own nasal packing overnight on 8/9.  -Cefepime/Vanc empirically for broad coverage for PNA (8/7-8/12)  -Continue Breo-Ellipta    ENT:  #Epistaxis  #Dysphonia  Controlled at bedside by ENT s/p cautery and packing. Appreciate  assistance. ENT will discuss long-term plan regarding septal perforation with pt post extubation.  -Management per ENT    Gastrointestinal, Nutrition  #GERD  #Congestive hepatopathy in the setting of cardiac insuffiencey - Resolved  #Hematemesis / oral mucosal bleeding -resolved    #Dysphagia  GI consulted 7/31 for black tarry stools and a possible GI bleed, however it is more likely swallowed blood from epistaxis that patient previously had. GI did not recommend an upper endoscopy. Recurrence of bloody stools likely due to epistaxis which have resolved. Will monitor Hb.  -Protonix 40mg IV BID  -Cleared for regular diet by SLP    Renal, Electrolytes  - Strict I/O, daily weights   - Avoid/limit nephrotoxins as able  - Replete lytes PRN per protocol  - Holding bumex today    Infectious Disease  #E.faecalis on culture from PICC line (8/2/22) - 1/2 tubes in 1/4 sets collected 8/2  #S.epidermidis (MSSE) on culture from PICC line (8/2/22) - 1/2 tubes in 1/4 sets collected 8/2  Vanc was supposed to end on 8/7 but given massive epistaxis will start broad spectrum antibiotics  -Cefepime/Vanc as above (since 8/7)    Hematology  #Anemia due to blood loss from Epistaxis-Resolved  #History of DVT and PE   #Antiphospholipid antibody syndrome  #History of iron deficiency anemia  -Transfuse to keep Hb>7  -BID Hgb today after nasal packing was removed  -Restart Heparin at 500U/hr; hold low-intensity heparin gtt and ASA while monitoring for epistaxis    Endocrinology  SSI    Rheumatology:  #SLE  - PTA meds: hydroxychloroquine 200mg, resumed 7/16  - Will need cellcept restarted when appropriate    Pertinent Lines  R Fem arterial line  8/7    ICU Cares:  Feeds: On PO diet  DVT Prophylaxis: On hold given epistaxis  GI Prophylaxis: PPI  Bowel Regimen: On hold given epistaxis and bloody stools     Patient seen and discussed with Dr. Lawrence.  Assessment and plan as above.    Spnecer Mehta MD  PGY3 IM      Subjective:  Reviewed events  from last 24 hours.  Patient removed his own nasal packing overnight. No further epistaxis.  No new symptoms.    Objective Findings:  Temp:  [96.8  F (36  C)-97.7  F (36.5  C)] 97.7  F (36.5  C)  Pulse:  [] 104  Resp:  [18-31] 18  BP: ()/() 100/85  SpO2:  [95 %-100 %] 95 %    Physical Exam:  GEN: Frail  HEENT: No epistaxis noted.  Pulm: Coarse breath sounds bilaterally  Cardiac: LVAD hum +  GI: soft, non distended  Neuro: Sedated  Vascular: No lower extremity edema    Medications:    acetaminophen  975 mg Oral or Feeding Tube Q8H CAMMY     [Held by provider] aspirin  81 mg Oral or Feeding Tube Daily     [Held by provider] atorvastatin  40 mg Oral QPM     [Held by provider] bumetanide  3 mg Oral BID     [Held by provider] captopril  12.5 mg Oral TID     ceFEPIme (MAXIPIME) IV  2 g Intravenous Q8H     digoxin  125 mcg Oral Daily     fluticasone-vilanterol  1 puff Inhalation Daily     hydrALAZINE  10 mg Oral Q8H CAMMY     hydroxychloroquine  200 mg Oral BID     insulin aspart  1-12 Units Subcutaneous Q4H     ketoconazole   Topical Daily     lidocaine  1 patch Transdermal Q24h    And     lidocaine   Transdermal Q8H CAMMY     melatonin  10 mg Oral At Bedtime     multivitamin, therapeutic  1 tablet Oral Daily     nystatin  1,000,000 Units Swish & Swallow 4x Daily     pantoprazole (PROTONIX) IV  40 mg Intravenous BID     QUEtiapine  150 mg Oral or Feeding Tube At Bedtime     QUEtiapine  25 mg Oral BID     [START ON 8/11/2022] sertraline  100 mg Oral Daily     sodium chloride  2 spray Both Nostrils Q4H     vancomycin  750 mg Intravenous Q12H     [Held by provider] zolpidem  5 mg Oral At Bedtime       dextrose Stopped (08/09/22 1430)       Labs:   CMP  Recent Labs   Lab 08/10/22  1131 08/10/22  0744 08/10/22  0602 08/10/22  0406 08/09/22  0353 08/09/22  0341 08/08/22  0403 08/08/22  0400 08/07/22  2028 08/07/22  1716 08/07/22  0307 08/07/22  0231   NA  --   --  136  --   --  134*  --  133*  --  135*   < > 133*    POTASSIUM  --   --  3.8  --   --  3.7  --  4.3  --  4.9   < > 4.6   CHLORIDE  --   --  104  --   --  103  --  100  --  101   < > 95*   CO2  --   --  22  --   --  23  --  26  --  26   < > 28   ANIONGAP  --   --  10  --   --  8  --  7  --  8   < > 10   GLC 86 92 93 95   < > 96   < > 108*   < > 100*   < > 115*   BUN  --   --  13.2  --   --  19.1  --  28.0*  --  30.1*   < > 26.3*   CR  --   --  0.61*  --   --  0.64*  --  0.71  --  0.69   < > 0.67   GFRESTIMATED  --   --  >90  --   --  >90  --  >90  --  >90   < > >90   ELDA  --   --  9.4  --   --  8.8  --  8.8  --  8.6*   < > 8.6*   MAG  --   --  2.5*  --   --  2.3  --  1.9  --   --   --  1.8   PHOS  --   --  4.0  --   --  4.3  --  3.5  --   --   --  4.2   PROTTOTAL  --   --  6.4  --   --  5.8*  --  5.9*  --  6.0*   < > 6.7   ALBUMIN  --   --  3.1*  --   --  2.9*  --  2.7*  --  2.9*   < > 3.1*   BILITOTAL  --   --  0.7  --   --  0.6  --  0.6  --  0.8   < > 0.4   ALKPHOS  --   --  129  --   --  129  --  144*  --  150*   < > 214*   AST  --   --  25  --   --  27  --  42  --  40   < > 31   ALT  --   --  14  --   --  20  --  25  --  20   < > 20    < > = values in this interval not displayed.     CBC  Recent Labs   Lab 08/10/22  0602 08/09/22 1940 08/09/22  0341 08/08/22 1947 08/08/22  1153 08/08/22  0400 08/07/22  1327 08/07/22  0957   WBC 9.1  --  8.7  --   --  9.2  --  17.0*   RBC 3.14*  --  2.92*  --   --  3.19*  --  2.92*   HGB 9.3*  9.3* 9.4* 8.5* 9.0*   < > 9.3*   < > 8.4*   HCT 29.0*  --  26.3*  --   --  28.2*  --  26.6*   MCV 92  --  90  --   --  88  --  91   MCH 29.6  --  29.1  --   --  29.2  --  28.8   MCHC 32.1  --  32.3  --   --  33.0  --  31.6   RDW 17.1*  --  16.6*  --   --  16.1*  --  16.2*     --  250  --   --  281  --  314    < > = values in this interval not displayed.     Arterial Blood Gas  Recent Labs   Lab 08/08/22  1607 08/08/22  1333 08/08/22  1014 08/08/22  0359   PH 7.42 7.46* 7.47* 7.44   PCO2 41 37 37 40   PO2 88 87 118* 201*   HCO3 26  26 27 28   O2PER 2 40 40 12

## 2022-08-10 NOTE — PLAN OF CARE
"Goal Outcome Evaluation:    Plan of Care Reviewed With: patient, daughter     Overall Patient Progress: improving    Transferred patient to  per orders.  Salvador is A &O x4, FINCH and follows commands.  PERRLA.  C/o back pain and headache today.  Gave 5 mg oxycodone and also is scheduled tylenol with some relief.  Patient was a bit impulsive this morning, wanting to get OOB.  C/o \"I can't breathe, I can't taste my food.  I want this packing out.  If they don't take it out, I will. \"  Patient continues to have both nares packed by ENT.  Placed oxymask @ 2-4 L and used relaxation techniques to call patient down.  Gave atarax for anxiety with good relief.  Patient is on schedule seroquel, zyprexa and zoloft. VSS.  LVAD flows 3.4-4, PI 3.7-5.6, unger 3.7, RPMs 5300.  Driveline C/D/I.  Patient did get approval from Dr. Pinon to go outside today with writer and monitor with the agreement he would leave the packing in until ENT removes it on Thursday.   He agreed. Voided sine marcelo removed.  BG WNL.  Stopped D10 gtt.  Did take in 1/2 of a small fruit smoothie from coffee shop and 1/4 of bottle of chocolate boost.  Ordered roast beef sandwich for dinner.  Daughter here today.  See flow sheets for further details           "

## 2022-08-10 NOTE — PLAN OF CARE
D: presented with cardiogenic shock c/b multiorgan failure (JOSE, shock liver) requiring mechanical support with IABP, now s/p HM3 LVAD 7/8/22. . Developed epistaxis on 8/6 night which was packed by ENT.    I: Monitored vitals and assessed pt status.     Changed:   Running:   PRN: Atarax x1, oxycodone x 1, Seroquel x1     A: A&Ox4. VSS on RA. Tele shows 1st degree AV block with BB, LVAD with no alarms and numbers within parameters -CDI. Afebrile. Urinating adequately via urinal. Small loose BM over night. Pt c/o back pain and overall discomfort and insomnia. Coccyx with blanchable redness. Groin area with redness. Scattered scabs in face, neck, chest. Sternal incision well healed and JOSE ENRIQUE. pulsate ordered and placed outside room until pt able to stay OOB for placement.    Pt did have nasal packing in both nares, one was pulled out by pt. around 1am (notified ENT) and the other shortly after even after conversations of keeping them in and taping them out of reach. No bleeding occurring and afrin at bedside.     P: Continue to monitor pt status and report changes to CVTS treatment team.     [id number]

## 2022-08-10 NOTE — PROGRESS NOTES
VAD team had previously planned to start VAD education today. However, with recent epistaxis, intubation and transfer to ICU, education is not appropriate. Called pt's daughter to check in. She agrees that pt is not yet appropriate for education and questions how much he would be able to retain.   Plan to check in on pt later this week to determine readiness to start VAD education.

## 2022-08-10 NOTE — PROGRESS NOTES
CV Surgery    Patient S/p LVAD on 7/8/22 complicated by RV failure. Was up on the floor improving until 8/7 when he had a significant nose bleed requiring intubation for airway protection. At that time he was transferred to cardiology for further management of his medical issues. Patient is now extubated and transferred back to the floor. Clinically stable with no further bleeding with INR is trending up (1.72 today). Incisions are healing well without signs of infection, sternum stable.   - No further recommendations for CV surgery standpoint, will sign off at this time  - Appreciate care by cardiology team  - Please do not hesitate to call with questions or concerns.     Rounded with Dr. Noah Gomez PA-C  Cardiothoracic Surgery  p: [phone]

## 2022-08-10 NOTE — PLAN OF CARE
Neuro: A&Ox4. Forgetful, anxious and impulsive at times.   Cardiac: 1st degree block with BB. MAP in 90s. LVAD with no alarms.    Respiratory: Satting 96% on RA. Pt using 3L oxymask for comfort. Bilateral nasal packing in place and pt gets anxious and feels like he can't breathe at times.   GI/: Voiding spontaneously. Last bm 8/8  Diet/appetite: 2g Na diet, did not eat supper. Likes to only chew on ice chips  Activity: Up with 2 assist.   Pain: lower back, oxycodone and tylenol given.   Skin: Coccyx skin fragile with blanchable redness, mepilex changed. Groin area with redness. Scattered scabs in face, neck, chest. Sternal incision well healed. Pt c/o mattress being not firm enough, pulsate ordered.  Lines: PIV x 2 Sl'd. Pt on IV antibiotics.   Replacements: rechecks in am.

## 2022-08-10 NOTE — PLAN OF CARE
Pt admitted 6/17 with cardiogenic shock s/p IABP and HM III 7/8 c/b RV fail s/p RVAD and de cannulated.  Pace on RA with chronic back pain.  Prn medications given and t-pump ordered and lidocaine patch removed.  MAP high and prn hydralazine given.  Nausea issue this am and actually GI cocktail resolved this.   Drive line dressing change and site red with rashes around tape line.  Anxious and on call light every few minutes throughout most of the shift.  Prn Atarax given with little effect.  Daughter stated pt needs to be on CPAP/BiPAP and not used yesterday r/t nasal packing and now removed by pt.  No nose bleed detected today.   Bed alarm and chair alarm in use.  Daughter updated via phone.  Otherwise, pt remains liable with mood and restless.  Continue to monitor and with POC.

## 2022-08-10 NOTE — CONSULTS
Psychiatry Consultation; Follow up              Reason for Consult, requesting source:    Anxiety and confusion in context of prolonged QTc, on many meds. I saw him initially 8/2.    Requesting source: Justo Stewart    Labs and imaging reviewed, discussed with nursing and primary team.                Interim history:    This 61 year old man with a complicated cardiac history had LVAD placed on 7/8, and one more visit to the OR was required.   Related to his prolonged hospitalization he had developed quite a bit of depression and anxiety.  When I saw him initially I had suggested changing hydroxyzine to Seroquel and using Seroquel at bedtime rather than trazodone.  Today he said his mood is better, including the improved appetite but he continues to have very poor sleep despite using 50 mg of Seroquel.  He continues on Zyprexa 5 mg 3 times per day and is using Seroquel during the day as well.  Although his mood is improved he still is quite anxious but overall he thinks it is improving although the sleep problems are quite distressing to him.  No episodes of confusion and no visual hallucinations, etc.  There is concern about his prolonged QTC in the context of being on many medications that could cause potential problems with this.          Current Medications:       acetaminophen  975 mg Oral or Feeding Tube Q8H CAMMY     [Held by provider] aspirin  81 mg Oral or Feeding Tube Daily     [Held by provider] atorvastatin  40 mg Oral QPM     [Held by provider] bumetanide  3 mg Oral BID     [Held by provider] captopril  12.5 mg Oral TID     ceFEPIme (MAXIPIME) IV  2 g Intravenous Q8H     digoxin  125 mcg Oral Daily     fluticasone-vilanterol  1 puff Inhalation Daily     hydrALAZINE  10 mg Oral Q8H CAMMY     hydroxychloroquine  200 mg Oral BID     insulin aspart  1-12 Units Subcutaneous Q4H     lidocaine  1 patch Transdermal Q24h    And     lidocaine   Transdermal Q8H CAMMY     melatonin  10 mg Oral At Bedtime      "multivitamin, therapeutic  1 tablet Oral Daily     nystatin  1,000,000 Units Swish & Swallow 4x Daily     OLANZapine  5 mg Oral BID     pantoprazole (PROTONIX) IV  40 mg Intravenous BID     QUEtiapine  100 mg Oral or Feeding Tube At Bedtime     QUEtiapine  25 mg Oral BID     sertraline  50 mg Oral Daily     sodium chloride  2 spray Both Nostrils Q4H     vancomycin  750 mg Intravenous Q12H     [Held by provider] zolpidem  5 mg Oral At Bedtime              MSE:   Appearance: awake, alert and adequately groomed  Attitude:  cooperative  Eye Contact:  good  Mood:  better  Affect:  intensity is blunted  Speech:  clear, coherent  Psychomotor Behavior:  no evidence of tardive dyskinesia, dystonia, or tics  Thought Process:  logical and linear  Associations:  no loose associations  Thought Content:  no evidence of suicidal ideation or homicidal ideation  Insight:  fair  Judgement:  fair  Oriented to:  time, person, and place  Attention Span and Concentration:  fair  Recent and Remote Memory:  fair    Vital signs:  Temp: 96.8  F (36  C) Temp src: Axillary BP: 108/66 Pulse: 100   Resp: 18 SpO2: 96 % O2 Device: None (Room air) Oxygen Delivery: 3 LPM Height: 170.2 cm (5' 7\") Weight: 69.5 kg (153 lb 3.2 oz)  Estimated body mass index is 23.99 kg/m  as calculated from the following:    Height as of this encounter: 1.702 m (5' 7\").    Weight as of this encounter: 69.5 kg (153 lb 3.2 oz).    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  Also I think he needs an increase in the Zoloft dose to at least 100 mg to address anxiety.  This really does have minimal impact on QTc.  I think with the Zyprexa discontinued we can safely increase the bedtime dosage of Seroquel.  I see that the Seroquel XR has been changed to IR presumably due to inadequate sedation.   He is " "doing better compared to when I had first seen him.           Summary of Recommendations:   Zyprexa may be discontinued.   Consider increasing Zoloft to 100 mg and increase HS Seroquel to 150 mg   Health psychology should be of assistance if he is going to be here for awhile (vs seeing them in TCU).   .Page me or re-consult psychiatry as needed (psychiatry is signing off).     Behzad Palomo M.D.   [facility]   Contact information available via [facility] Paging/Directory.  If I am not available, then [facility] intake ([phone]) should know who   Is on call          \"Much or all of the text in this note was generated through the use of Dragon Dictate voice to text software. Errors in spelling or words which appear to be out of contact are unintentional, may be present due having escaped editing\"           "

## 2022-08-11 ENCOUNTER — APPOINTMENT (OUTPATIENT)
Dept: PHYSICAL THERAPY | Facility: CLINIC | Age: 62
DRG: 001 | End: 2022-08-11
Attending: INTERNAL MEDICINE
Payer: COMMERCIAL

## 2022-08-11 ENCOUNTER — APPOINTMENT (OUTPATIENT)
Dept: OCCUPATIONAL THERAPY | Facility: CLINIC | Age: 62
DRG: 001 | End: 2022-08-11
Attending: INTERNAL MEDICINE
Payer: COMMERCIAL

## 2022-08-11 LAB
ALBUMIN SERPL BCG-MCNC: 3 G/DL (ref 3.5–5.2)
ALP SERPL-CCNC: 134 U/L (ref 40–129)
ALT SERPL W P-5'-P-CCNC: 12 U/L (ref 10–50)
ANION GAP SERPL CALCULATED.3IONS-SCNC: 10 MMOL/L (ref 7–15)
AST SERPL W P-5'-P-CCNC: 23 U/L (ref 10–50)
BILIRUB SERPL-MCNC: 0.6 MG/DL
BUN SERPL-MCNC: 12.5 MG/DL (ref 8–23)
CA-I BLD-MCNC: 4.8 MG/DL (ref 4.4–5.2)
CALCIUM SERPL-MCNC: 9 MG/DL (ref 8.8–10.2)
CHLORIDE SERPL-SCNC: 107 MMOL/L (ref 98–107)
CREAT SERPL-MCNC: 0.65 MG/DL (ref 0.67–1.17)
DEPRECATED HCO3 PLAS-SCNC: 18 MMOL/L (ref 22–29)
ERYTHROCYTE [DISTWIDTH] IN BLOOD BY AUTOMATED COUNT: 17.5 % (ref 10–15)
GFR SERPL CREATININE-BSD FRML MDRD: >90 ML/MIN/1.73M2
GLUCOSE BLDC GLUCOMTR-MCNC: 105 MG/DL (ref 70–99)
GLUCOSE BLDC GLUCOMTR-MCNC: 106 MG/DL (ref 70–99)
GLUCOSE BLDC GLUCOMTR-MCNC: 87 MG/DL (ref 70–99)
GLUCOSE BLDC GLUCOMTR-MCNC: 88 MG/DL (ref 70–99)
GLUCOSE SERPL-MCNC: 99 MG/DL (ref 70–99)
HCT VFR BLD AUTO: 31.5 % (ref 40–53)
HGB BLD-MCNC: 10.2 G/DL (ref 13.3–17.7)
HGB BLD-MCNC: 9.9 G/DL (ref 13.3–17.7)
INR PPP: 1.96 (ref 0.85–1.15)
LACTATE SERPL-SCNC: 1.1 MMOL/L (ref 0.7–2)
MAGNESIUM SERPL-MCNC: 2.1 MG/DL (ref 1.7–2.3)
MCH RBC QN AUTO: 29.4 PG (ref 26.5–33)
MCHC RBC AUTO-ENTMCNC: 31.4 G/DL (ref 31.5–36.5)
MCV RBC AUTO: 94 FL (ref 78–100)
PHOSPHATE SERPL-MCNC: 3.6 MG/DL (ref 2.5–4.5)
PLATELET # BLD AUTO: 285 10E3/UL (ref 150–450)
POTASSIUM SERPL-SCNC: 3.8 MMOL/L (ref 3.4–5.3)
PROT SERPL-MCNC: 6.4 G/DL (ref 6.4–8.3)
RBC # BLD AUTO: 3.37 10E6/UL (ref 4.4–5.9)
SODIUM SERPL-SCNC: 135 MMOL/L (ref 136–145)
UFH PPP CHRO-ACNC: <0.1 IU/ML
UFH PPP CHRO-ACNC: <0.1 IU/ML
WBC # BLD AUTO: 9.5 10E3/UL (ref 4–11)

## 2022-08-11 PROCEDURE — 85520 HEPARIN ASSAY: CPT | Performed by: INTERNAL MEDICINE

## 2022-08-11 PROCEDURE — 250N000013 HC RX MED GY IP 250 OP 250 PS 637: Performed by: STUDENT IN AN ORGANIZED HEALTH CARE EDUCATION/TRAINING PROGRAM

## 2022-08-11 PROCEDURE — 85018 HEMOGLOBIN: CPT | Performed by: STUDENT IN AN ORGANIZED HEALTH CARE EDUCATION/TRAINING PROGRAM

## 2022-08-11 PROCEDURE — 250N000011 HC RX IP 250 OP 636: Performed by: STUDENT IN AN ORGANIZED HEALTH CARE EDUCATION/TRAINING PROGRAM

## 2022-08-11 PROCEDURE — 93010 ELECTROCARDIOGRAM REPORT: CPT | Performed by: INTERNAL MEDICINE

## 2022-08-11 PROCEDURE — 250N000013 HC RX MED GY IP 250 OP 250 PS 637: Performed by: INTERNAL MEDICINE

## 2022-08-11 PROCEDURE — 84100 ASSAY OF PHOSPHORUS: CPT | Performed by: INTERNAL MEDICINE

## 2022-08-11 PROCEDURE — 250N000013 HC RX MED GY IP 250 OP 250 PS 637: Performed by: PHYSICIAN ASSISTANT

## 2022-08-11 PROCEDURE — 83605 ASSAY OF LACTIC ACID: CPT | Performed by: INTERNAL MEDICINE

## 2022-08-11 PROCEDURE — 83735 ASSAY OF MAGNESIUM: CPT | Performed by: INTERNAL MEDICINE

## 2022-08-11 PROCEDURE — 93005 ELECTROCARDIOGRAM TRACING: CPT

## 2022-08-11 PROCEDURE — C9113 INJ PANTOPRAZOLE SODIUM, VIA: HCPCS | Performed by: STUDENT IN AN ORGANIZED HEALTH CARE EDUCATION/TRAINING PROGRAM

## 2022-08-11 PROCEDURE — 97535 SELF CARE MNGMENT TRAINING: CPT | Mod: GO

## 2022-08-11 PROCEDURE — 258N000003 HC RX IP 258 OP 636: Performed by: STUDENT IN AN ORGANIZED HEALTH CARE EDUCATION/TRAINING PROGRAM

## 2022-08-11 PROCEDURE — 250N000009 HC RX 250: Performed by: STUDENT IN AN ORGANIZED HEALTH CARE EDUCATION/TRAINING PROGRAM

## 2022-08-11 PROCEDURE — 85610 PROTHROMBIN TIME: CPT | Performed by: INTERNAL MEDICINE

## 2022-08-11 PROCEDURE — 250N000013 HC RX MED GY IP 250 OP 250 PS 637: Performed by: SURGERY

## 2022-08-11 PROCEDURE — 82330 ASSAY OF CALCIUM: CPT | Performed by: INTERNAL MEDICINE

## 2022-08-11 PROCEDURE — 84450 TRANSFERASE (AST) (SGOT): CPT | Performed by: INTERNAL MEDICINE

## 2022-08-11 PROCEDURE — 36415 COLL VENOUS BLD VENIPUNCTURE: CPT | Performed by: INTERNAL MEDICINE

## 2022-08-11 PROCEDURE — 99232 SBSQ HOSP IP/OBS MODERATE 35: CPT | Performed by: INTERNAL MEDICINE

## 2022-08-11 PROCEDURE — 85027 COMPLETE CBC AUTOMATED: CPT | Performed by: INTERNAL MEDICINE

## 2022-08-11 PROCEDURE — 97116 GAIT TRAINING THERAPY: CPT | Mod: GP | Performed by: PHYSICAL THERAPIST

## 2022-08-11 PROCEDURE — 36415 COLL VENOUS BLD VENIPUNCTURE: CPT | Performed by: STUDENT IN AN ORGANIZED HEALTH CARE EDUCATION/TRAINING PROGRAM

## 2022-08-11 PROCEDURE — 214N000001 HC R&B CCU UMMC

## 2022-08-11 RX ORDER — HEPARIN SODIUM 10000 [USP'U]/100ML
0-5000 INJECTION, SOLUTION INTRAVENOUS CONTINUOUS
Status: DISCONTINUED | OUTPATIENT
Start: 2022-08-11 | End: 2022-08-17

## 2022-08-11 RX ORDER — SODIUM CHLORIDE/ALOE VERA
GEL (GRAM) NASAL AT BEDTIME
Status: DISCONTINUED | OUTPATIENT
Start: 2022-08-11 | End: 2022-08-21 | Stop reason: HOSPADM

## 2022-08-11 RX ORDER — HYDRALAZINE HYDROCHLORIDE 25 MG/1
25 TABLET, FILM COATED ORAL EVERY 8 HOURS SCHEDULED
Status: DISCONTINUED | OUTPATIENT
Start: 2022-08-11 | End: 2022-08-15

## 2022-08-11 RX ADMIN — VANCOMYCIN HYDROCHLORIDE 750 MG: 10 INJECTION, POWDER, LYOPHILIZED, FOR SOLUTION INTRAVENOUS at 18:45

## 2022-08-11 RX ADMIN — NYSTATIN 1000000 UNITS: 100000 SUSPENSION ORAL at 08:46

## 2022-08-11 RX ADMIN — THERA TABS 1 TABLET: TAB at 08:45

## 2022-08-11 RX ADMIN — SERTRALINE HYDROCHLORIDE 100 MG: 100 TABLET ORAL at 08:45

## 2022-08-11 RX ADMIN — LIDOCAINE PATCH 4% 1 PATCH: 40 PATCH TOPICAL at 08:46

## 2022-08-11 RX ADMIN — HYDRALAZINE HYDROCHLORIDE 10 MG: 20 INJECTION INTRAMUSCULAR; INTRAVENOUS at 04:33

## 2022-08-11 RX ADMIN — OXYCODONE HYDROCHLORIDE 5 MG: 5 TABLET ORAL at 05:47

## 2022-08-11 RX ADMIN — HYDROXYCHLOROQUINE SULFATE 200 MG: 200 TABLET, FILM COATED ORAL at 08:45

## 2022-08-11 RX ADMIN — VANCOMYCIN HYDROCHLORIDE 750 MG: 10 INJECTION, POWDER, LYOPHILIZED, FOR SOLUTION INTRAVENOUS at 05:21

## 2022-08-11 RX ADMIN — FLUTICASONE FUROATE AND VILANTEROL TRIFENATATE 1 PUFF: 100; 25 POWDER RESPIRATORY (INHALATION) at 08:47

## 2022-08-11 RX ADMIN — SALINE NASAL SPRAY 2 SPRAY: 1.5 SOLUTION NASAL at 00:32

## 2022-08-11 RX ADMIN — SALINE NASAL SPRAY 2 SPRAY: 1.5 SOLUTION NASAL at 08:46

## 2022-08-11 RX ADMIN — NYSTATIN 1000000 UNITS: 100000 SUSPENSION ORAL at 15:49

## 2022-08-11 RX ADMIN — PANTOPRAZOLE SODIUM 40 MG: 40 INJECTION, POWDER, FOR SOLUTION INTRAVENOUS at 08:37

## 2022-08-11 RX ADMIN — QUETIAPINE FUMARATE 50 MG: 50 TABLET ORAL at 01:27

## 2022-08-11 RX ADMIN — ATORVASTATIN CALCIUM 40 MG: 40 TABLET, FILM COATED ORAL at 20:31

## 2022-08-11 RX ADMIN — NYSTATIN 1000000 UNITS: 100000 SUSPENSION ORAL at 20:31

## 2022-08-11 RX ADMIN — HYDRALAZINE HYDROCHLORIDE 25 MG: 25 TABLET, FILM COATED ORAL at 20:33

## 2022-08-11 RX ADMIN — Medication 6.25 MG: at 08:45

## 2022-08-11 RX ADMIN — ACETAMINOPHEN 975 MG: 325 TABLET, FILM COATED ORAL at 05:21

## 2022-08-11 RX ADMIN — CEFEPIME HYDROCHLORIDE 2 G: 2 INJECTION, POWDER, FOR SOLUTION INTRAVENOUS at 20:34

## 2022-08-11 RX ADMIN — Medication 6.25 MG: at 14:21

## 2022-08-11 RX ADMIN — OXYCODONE HYDROCHLORIDE 5 MG: 5 TABLET ORAL at 09:47

## 2022-08-11 RX ADMIN — ACETAMINOPHEN 975 MG: 325 TABLET, FILM COATED ORAL at 14:21

## 2022-08-11 RX ADMIN — PANTOPRAZOLE SODIUM 40 MG: 40 INJECTION, POWDER, FOR SOLUTION INTRAVENOUS at 20:30

## 2022-08-11 RX ADMIN — Medication 6.25 MG: at 20:33

## 2022-08-11 RX ADMIN — SALINE NASAL SPRAY 2 SPRAY: 1.5 SOLUTION NASAL at 12:53

## 2022-08-11 RX ADMIN — HYDRALAZINE HYDROCHLORIDE 20 MG: 20 INJECTION INTRAMUSCULAR; INTRAVENOUS at 16:02

## 2022-08-11 RX ADMIN — Medication 10 MG: at 20:32

## 2022-08-11 RX ADMIN — SALINE NASAL SPRAY 2 SPRAY: 1.5 SOLUTION NASAL at 20:34

## 2022-08-11 RX ADMIN — QUETIAPINE FUMARATE 150 MG: 50 TABLET ORAL at 20:33

## 2022-08-11 RX ADMIN — HYDROXYZINE HYDROCHLORIDE 10 MG: 10 TABLET ORAL at 12:52

## 2022-08-11 RX ADMIN — QUETIAPINE FUMARATE 25 MG: 25 TABLET ORAL at 15:49

## 2022-08-11 RX ADMIN — NYSTATIN 1000000 UNITS: 100000 SUSPENSION ORAL at 12:53

## 2022-08-11 RX ADMIN — CEFEPIME HYDROCHLORIDE 2 G: 2 INJECTION, POWDER, FOR SOLUTION INTRAVENOUS at 12:53

## 2022-08-11 RX ADMIN — HYDRALAZINE HYDROCHLORIDE 10 MG: 10 TABLET, FILM COATED ORAL at 05:21

## 2022-08-11 RX ADMIN — HYDROXYZINE HYDROCHLORIDE 10 MG: 10 TABLET ORAL at 04:33

## 2022-08-11 RX ADMIN — QUETIAPINE FUMARATE 25 MG: 25 TABLET ORAL at 08:45

## 2022-08-11 RX ADMIN — OXYCODONE HYDROCHLORIDE 5 MG: 5 TABLET ORAL at 00:27

## 2022-08-11 RX ADMIN — OXYCODONE HYDROCHLORIDE 5 MG: 5 TABLET ORAL at 15:49

## 2022-08-11 RX ADMIN — NYSTATIN 1000000 UNITS: 100000 SUSPENSION ORAL at 12:52

## 2022-08-11 RX ADMIN — HYDRALAZINE HYDROCHLORIDE 25 MG: 25 TABLET, FILM COATED ORAL at 14:21

## 2022-08-11 RX ADMIN — SALINE NASAL SPRAY 2 SPRAY: 1.5 SOLUTION NASAL at 04:40

## 2022-08-11 RX ADMIN — HYDROXYCHLOROQUINE SULFATE 200 MG: 200 TABLET, FILM COATED ORAL at 20:31

## 2022-08-11 RX ADMIN — LIDOCAINE HYDROCHLORIDE 30 ML: 20 SOLUTION ORAL; TOPICAL at 06:02

## 2022-08-11 RX ADMIN — ACETAMINOPHEN 975 MG: 325 TABLET, FILM COATED ORAL at 22:17

## 2022-08-11 RX ADMIN — DIGOXIN 125 MCG: 125 TABLET ORAL at 08:45

## 2022-08-11 RX ADMIN — CEFEPIME HYDROCHLORIDE 2 G: 2 INJECTION, POWDER, FOR SOLUTION INTRAVENOUS at 04:33

## 2022-08-11 ASSESSMENT — ACTIVITIES OF DAILY LIVING (ADL)
ADLS_ACUITY_SCORE: 38

## 2022-08-11 NOTE — PROGRESS NOTES
D/days reported he called q 1-5 minutes for several shift, now calling frequently but less than above He continues on ATB, and declines NS gel, BS and sliding scale insulin discontinue;dc'd per tem  He says no nosebleeds (days reported he removed the packing from ENT). He continues on Heparin drip. He takes some prn pain meds and has scheduled tylenol. Bed and chair alarm are on. He insisted on all 10 meds early as he wants to go to sleep and does not want them later-given, and he refused tylenol  A/progressing slowly  P/monitor for changes

## 2022-08-11 NOTE — PROGRESS NOTES
Pt seen for bipap @ noc order. Pt refused bipap @ noc and says will maybe start tomorrow after sleep study. RN notified of pt refusing bipap @ noc.    Cole Francis RT

## 2022-08-11 NOTE — PROGRESS NOTES
"Otolaryngology Progress Note  08/11/2022      Subjective/Intvl events: Patient reports removing both Merocels due to discomfort. He states they were interfering with eating and his sleep schedule. He has not bled since the Merocels were removed. He has be re-started on heparin.    O: BP (!) 108/93 (BP Location: Right arm)   Pulse 102   Temp (!) 96.7  F (35.9  C) (Axillary)   Resp 18   Ht 1.702 m (5' 7\")   Wt 69.9 kg (154 lb)   SpO2 96%   BMI 24.12 kg/m    General: laying in bed, no acute distress  HEAD: normocephalic, atraumatic  Face: symmetrical, no swelling, edema, or erythema.   Eyes: EOMI, clear sclera  Ears: No external abnormalities  Nose: No anterior drainage, dark crusting noted in septal perforation at the mid/anterior of the nasal passage, no Merocels in place  Mouth: moist, no ulcers, no jaw or tooth tenderness, tongue midline and symmetric, edentulous  Oropharynx: tonsils within normal limits, uvula midline, no oropharyngeal erythema, no patience blood or clots noted in the oropharynx  Neck: no LAD, trachea midline  Neuro: Alert and oriented  Respiratory: breathing non-labored on RA, no stridor      A/P:Dandy Sands is a 61 year old male with a past medical history of CAD s/p PCI to LAD, MI, ICM, acute on chronic heart failure, s/p CRT-D, severe MR, antiphospholipid antibody syndrome on chronic warfarin. He has been treated on multiple occasions for epistaxis and most recently required intubation to protect his airway. He was packed bilaterally with 4cm Merocels and Surgiflo. He removed both Merocels himself on 8/8/2022 and has not experienced recurrent bleeding since then. He has also since been re-started on Heparin. His septal perforation along with anticoagulation will put him at higher risk for bleeding.      - Continue nasal saline q4h while awake  - Recommend Ayr nasal saline gel to anterior nares QHS, alternatively may use Aquaphor or Vaseline  - If supplemental oxygen is needed, recommend " adding humidity  - No further staph prophylaxis required  - If bleeding recurs, spray Afrin (3 sprays) and hold firm digital pressure over the soft part of the nose for 20 minutes continuously. Nasal clips are ineffective and should not be used in place of digital pressure. If bleeding continues despite these measures, repeat. If bleeding continues or is severe please contact ENT.  - Remainder of care per primary team, please do not hesitate to contact ENT with questions/concerns      Saba Wilson PA-C  Otolaryngology-Head & Neck Surgery  Please contact ENT with questions by dialing [phone] and entering job code [phone] when prompted.

## 2022-08-11 NOTE — PLAN OF CARE
Pt admitted 6/17 with cardiogenic shock s/p IABP and HM III 7/8 c/b RV fail s/p RVAD and de cannulated.  Pace/SR on RA with chronic back pain.  Prn medications given and t-pump in use.  Lidocaine patch in use as well and wanted it despite education about lidocaine risk.  MAP high and prn hydralazine given.  Nausea better today and appetite slowly improving.   Drive line dressing change and site red with rashes around tape line, but looks better than yesterday.  Anxious and on call light every few minutes throughout most of the shift, but seems to be improving now as delirium seems to be resolving.  ENT inspected nose today.  Heparin gtt is now on 10a protocol.  Heparin now running at 850 units/hr and next 10a for 1700.  Bed alarm and chair alarm in use.  Updated son with progress and pt wallet given to son.  Patient relations contacted pt in room via phone.   Otherwise, pt remains liable with mood and restless, but improving.  Continue to monitor and with POC.

## 2022-08-11 NOTE — PLAN OF CARE
D: presented 6/17 with cardiogenic shock c/b multiorgan failure (JOSE, shock liver) requiring mechanical support with IABP, now s/p HM3 LVAD 7/8/22 by Dr. Zamora   PMH: lupus, antiphospholipid syndrome, HTN, HLD, HFrEF 2/2 ICM      I: Monitored vitals and assessed pt status. Encouraged activity.   Running:   - fixed rate heparin 500 units/hr   - intermittent abx  PRN: oxycodone 5 mg x2, Seroquel at HS, Atarax x1, GI cocktail x1, IV hydralazine x1 for MAP >90     A: A&Ox4. Restless/anxious at times. Bed alarm on for pt safety. VSS on RA. Tele shows SR/ST with BBB, rate 90-100s. HM3 LVAD numbers WNL, no alarms overnight, dressing CDI. Afebrile. Reported pain in lower back, partially managed by repositioning, heat therapy, prn and scheduled medications. Urinating adequately. LBM 8/10. BG checks Q4hr, no insulin needed. Up with Ax2, GB, and walker. Did not sleep at all this shift.      P: Continue to monitor pt status and report changes to Cards 2 treatment team.      [id number]  Chantal Reza RN on 8/11/2022 at 6:41 AM

## 2022-08-11 NOTE — PLAN OF CARE
Shift [id number]   ?  A/I : Monitored vitals and assessed pt status. A0x4, restless/anxious at times. Vitals stable on RA. NSR with 1st degree AV block and BBB. Afebrile. Urinating adequately via urinal. Last bowel movement this AM. Poor appetite on regular diet. Denies chest pain, palpitations, shortness of breath, nausea, dizziness. Describes feeling crummy today, but feeling better this evening. Managing back pain with scheduled tylenol and PRN oxycodone. No new skin concerns observed. Up with assist 2 with GB and walker. IV access: R PIV x2.     Changed:Heparin gtt started   Running: Heparin gtt at 500 units/hr and intermittent antibiotics  PRN: atarax for anxiety  ?  P: Continue to monitor Pt status and report changes to Cards 2.

## 2022-08-11 NOTE — PROGRESS NOTES
[facility]   Cards 2 - Progress Note    Dandy Sands MRN: [id number]  Age: 61 year old, : 1960  Date: 2022      Assessment and Plan:  Dandy Sands is a 61 year old male with PMH of lupus, antiphospholipid syndrome, HTN, HLD, HFrEF 2/2 ICM who presented with cardiogenic shock c/b multiorgan failure (JOSE, shock liver) requiring mechanical support with IABP, now s/p HM3 LVAD 22 by Dr. Zamora with intraoperative course c/b RV failure s/p 29F Protek duo via RIJ. Post op course c/b hemopericardium s/p repeat washout with chest left open. Chest closed 22 and decannulated from RVAD . Developed epistaxis on  night which was packed by ENT. Unfortunately bleeding persisted and there was concern for airway protection on  AM which prompted intubation and transfer to ICU. Patient has since been transferred to the floor and removed his own nasal packing in the evening .     Changes today:  -Monitor for epistaxis after patient removed his own nasal packing last night  -Increase Hydralazine to 25mg TID for afterload reduction; c/t Captopril 6.25mg TID  -Increase heparin gtt to low-intensity; hold ASA while monitoring for epistaxis  -Hold Bumex; euvolemic on exam    Neurology  #Insomnia/Delirium  Some concern that he had a fall overnight on . Discussed with nursing staff and he did NOT have a fall, although he did try to get out of bed. CT head was obtained () and it was unremarkable.  -Per psych recs:   - discontinue olanzapine   - Sertraline and Quetiapine per psych recs    Cardiovascular  #HFrEF 2/2 ICM s/p HM3 LVAD in setting of cardiogenic shock (SCAI D)  #RV failure s/p Protek duo temporary RVAD s/p decannulation   #RV thrombus  #Post chest closure hemopericardium s/p repeat washout ()  #PMH CAD s/p PCI to LAD ()  #S/p CRT-D ()  Patient with ICM s/p HM3 LVAD 22 by Dr. Zamora in setting of presentation for cardiogenic shock requiring  MCS with IABP as prior to HM (initially evaluated for heart transplant, however noted to have substantially elevated DSAs during course of care, so decision made to proceed with LVAD given patient already on temporary MCS). Intraoperative course c/b RV failure resulting in cardiogenic shock requiring high dose pressors necessitating RVAD support. Initial post-op course c/b hemopericardium requiring repeat washout with chest left open, however has since recovered well with decannulation on 7/21 with extubation day prior. Has continued to tolerate well from hemodynamic and end organ standpoint.   -LVAD: continues to have good flows, no alarms and stable end organ perfusion; continue speed at 5300 rpm  -continue digoxin for RV support  -AC, antiplatelets: Increase heparin gtt to low-intensity; hold ASA while monitoring for epistaxis  -Volume status: euvolemic, hold Bumex for now  -Of note, patient with climbing capture threshold of RV lead; will need to be evaluated in future by EP team  -blood pressure: Increase Hydralazine to 25mg TID for afterload reduction; c/t Captopril 6.25mg TID    Pulmonary  #Epistaxis  #Intubated due to Concern for airway protection (8/7)-Extubated (8/8)  #Mild-moderate emphysema  #History of COVID-19  #Iatrogenic R apical PTX  H/o nasal perforation that required elective procedure (in [place]) which was postponed due to LVAD insertion. Developed epistaxis not fixed by packing overnight on 8/6 and pt continued to bleed. Intubated due to concern for ability to maintain airway from bleeding. Extubated 8/8  -Appreciate ENT assistance in managing epistaxis. Patient removed his own nasal packing overnight on 8/9.  -Cefepime/Vanc empirically for broad coverage for PNA (8/7-8/12)  -Continue Breo-Ellipta    ENT:  #Epistaxis  #Dysphonia  Controlled at bedside by ENT s/p cautery and packing. Appreciate assistance. ENT will discuss long-term plan regarding septal perforation with pt post  extubation.  -Management per ENT    Gastrointestinal, Nutrition  #GERD  #Congestive hepatopathy in the setting of cardiac insuffiencey - Resolved  #Hematemesis / oral mucosal bleeding -resolved    #Dysphagia  GI consulted 7/31 for black tarry stools and a possible GI bleed, however it is more likely swallowed blood from epistaxis that patient previously had. GI did not recommend an upper endoscopy. Recurrence of bloody stools likely due to epistaxis which have resolved. Will monitor Hb.  -Protonix 40mg IV BID  -Cleared for regular diet by SLP    Renal, Electrolytes  - Strict I/O, daily weights   - Avoid/limit nephrotoxins as able  - Replete lytes PRN per protocol  - Holding bumex today    Infectious Disease  #E.faecalis on culture from PICC line (8/2/22) - 1/2 tubes in 1/4 sets collected 8/2  #S.epidermidis (MSSE) on culture from PICC line (8/2/22) - 1/2 tubes in 1/4 sets collected 8/2  Vanc was supposed to end on 8/7 but given massive epistaxis will start broad spectrum antibiotics  -Cefepime/Vanc as above (since 8/7)    Hematology  #Anemia due to blood loss from Epistaxis-Resolved  #History of DVT and PE   #Antiphospholipid antibody syndrome  #History of iron deficiency anemia  -Transfuse to keep Hb>7  -BID Hgb today after nasal packing was removed  -Restart Heparin at 500U/hr; hold low-intensity heparin gtt and ASA while monitoring for epistaxis    Endocrinology  SSI    Rheumatology:  #SLE  - PTA meds: hydroxychloroquine 200mg, resumed 7/16  - Will need cellcept restarted when appropriate    Pertinent Lines  R Fem arterial line  8/7    ICU Cares:  Feeds: On PO diet  DVT Prophylaxis: On hold given epistaxis  GI Prophylaxis: PPI  Bowel Regimen: On hold given epistaxis and bloody stools     Patient seen and discussed with Dr. Sahni.  Assessment and plan as above.    Spencer Mehta MD  PGY3 IM      Subjective:  Reviewed events from last 24 hours. No further epistaxis.  No new symptoms.    Objective Findings:  Temp:   [96.5  F (35.8  C)-98.2  F (36.8  C)] 97.7  F (36.5  C)  Pulse:  [] 96  Resp:  [18-20] 18  BP: ()/(49-93) 126/49  SpO2:  [94 %-96 %] 96 %    Physical Exam:  GEN: Frail  HEENT: No epistaxis noted.  Pulm: Coarse breath sounds bilaterally  Cardiac: LVAD hum +  GI: soft, non distended  Neuro: Sedated  Vascular: No lower extremity edema    Medications:    acetaminophen  975 mg Oral or Feeding Tube Q8H CAMMY     [Held by provider] aspirin  81 mg Oral or Feeding Tube Daily     atorvastatin  40 mg Oral QPM     [Held by provider] bumetanide  3 mg Oral BID     captopril  6.25 mg Oral TID     ceFEPIme (MAXIPIME) IV  2 g Intravenous Q8H     digoxin  125 mcg Oral Daily     fluticasone-vilanterol  1 puff Inhalation Daily     hydrALAZINE  25 mg Oral Q8H CAMMY     hydroxychloroquine  200 mg Oral BID     insulin aspart  1-12 Units Subcutaneous Q4H     ketoconazole   Topical Daily     lidocaine  1 patch Transdermal Q24h    And     lidocaine   Transdermal Q8H CAMMY     melatonin  10 mg Oral At Bedtime     multivitamin, therapeutic  1 tablet Oral Daily     nystatin  1,000,000 Units Swish & Swallow 4x Daily     pantoprazole (PROTONIX) IV  40 mg Intravenous BID     QUEtiapine  150 mg Oral or Feeding Tube At Bedtime     QUEtiapine  25 mg Oral BID     saline nasal   Each Nare At Bedtime     sertraline  100 mg Oral Daily     sodium chloride  2 spray Both Nostrils Q4H     vancomycin  750 mg Intravenous Q12H     [Held by provider] zolpidem  5 mg Oral At Bedtime       dextrose Stopped (08/09/22 1430)     heparin 850 Units/hr (08/11/22 1247)       Labs:   CMP  Recent Labs   Lab 08/11/22  0815 08/11/22  0709 08/11/22  0443 08/11/22  0132 08/10/22  0744 08/10/22  0602 08/09/22  0353 08/09/22  0341 08/08/22  0403 08/08/22  0400   NA  --  135*  --   --   --  136  --  134*  --  133*   POTASSIUM  --  3.8  --   --   --  3.8  --  3.7  --  4.3   CHLORIDE  --  107  --   --   --  104  --  103  --  100   CO2  --  18*  --   --   --  22  --  23  --  26    ANIONGAP  --  10  --   --   --  10  --  8  --  7   * 99 88 106*   < > 93   < > 96   < > 108*   BUN  --  12.5  --   --   --  13.2  --  19.1  --  28.0*   CR  --  0.65*  --   --   --  0.61*  --  0.64*  --  0.71   GFRESTIMATED  --  >90  --   --   --  >90  --  >90  --  >90   ELDA  --  9.0  --   --   --  9.4  --  8.8  --  8.8   MAG  --  2.1  --   --   --  2.5*  --  2.3  --  1.9   PHOS  --  3.6  --   --   --  4.0  --  4.3  --  3.5   PROTTOTAL  --  6.4  --   --   --  6.4  --  5.8*  --  5.9*   ALBUMIN  --  3.0*  --   --   --  3.1*  --  2.9*  --  2.7*   BILITOTAL  --  0.6  --   --   --  0.7  --  0.6  --  0.6   ALKPHOS  --  134*  --   --   --  129  --  129  --  144*   AST  --  23  --   --   --  25  --  27  --  42   ALT  --  12  --   --   --  14  --  20  --  25    < > = values in this interval not displayed.     CBC  Recent Labs   Lab 08/11/22  0709 08/10/22  1534 08/10/22  0602 08/09/22  1940 08/09/22  0341   WBC 9.5 8.9 9.1  --  8.7   RBC 3.37* 3.25* 3.14*  --  2.92*   HGB 9.9* 9.6*  9.6* 9.3*  9.3* 9.4* 8.5*   HCT 31.5* 30.1* 29.0*  --  26.3*   MCV 94 93 92  --  90   MCH 29.4 29.5 29.6  --  29.1   MCHC 31.4* 31.9 32.1  --  32.3   RDW 17.5* 17.3* 17.1*  --  16.6*    288 266  --  250     Arterial Blood Gas  Recent Labs   Lab 08/08/22  1607 08/08/22  1333 08/08/22  1014 08/08/22  0359   PH 7.42 7.46* 7.47* 7.44   PCO2 41 37 37 40   PO2 88 87 118* 201*   HCO3 26 26 27 28   O2PER 2 40 40 50     I have reviewed today's vital signs, notes, medications, labs and imaging.  I have also seen and examined the patient and agree with the findings and plan as outlined above.  Pt with HM3 on 7-8-22 with course complicated by epistaxis requiring intubation X2.  Pt currently on hydralazine and captpril for BP control.  Will slowly institute low intensity heparin gtt.      Darvin Sahni MD, PhD  Professor, Heart Failure and Cardiac Transplantation  [facility]

## 2022-08-12 ENCOUNTER — APPOINTMENT (OUTPATIENT)
Dept: GENERAL RADIOLOGY | Facility: CLINIC | Age: 62
DRG: 001 | End: 2022-08-12
Attending: INTERNAL MEDICINE
Payer: COMMERCIAL

## 2022-08-12 ENCOUNTER — APPOINTMENT (OUTPATIENT)
Dept: PHYSICAL THERAPY | Facility: CLINIC | Age: 62
DRG: 001 | End: 2022-08-12
Attending: INTERNAL MEDICINE
Payer: COMMERCIAL

## 2022-08-12 ENCOUNTER — APPOINTMENT (OUTPATIENT)
Dept: OCCUPATIONAL THERAPY | Facility: CLINIC | Age: 62
DRG: 001 | End: 2022-08-12
Attending: INTERNAL MEDICINE
Payer: COMMERCIAL

## 2022-08-12 ENCOUNTER — APPOINTMENT (OUTPATIENT)
Dept: SPEECH THERAPY | Facility: CLINIC | Age: 62
DRG: 001 | End: 2022-08-12
Attending: INTERNAL MEDICINE
Payer: COMMERCIAL

## 2022-08-12 LAB
ALBUMIN SERPL BCG-MCNC: 3 G/DL (ref 3.5–5.2)
ALP SERPL-CCNC: 127 U/L (ref 40–129)
ALT SERPL W P-5'-P-CCNC: 12 U/L (ref 10–50)
ANION GAP SERPL CALCULATED.3IONS-SCNC: 10 MMOL/L (ref 7–15)
AST SERPL W P-5'-P-CCNC: 26 U/L (ref 10–50)
BILIRUB SERPL-MCNC: 0.6 MG/DL
BUN SERPL-MCNC: 11.7 MG/DL (ref 8–23)
CA-I BLD-MCNC: 4.8 MG/DL (ref 4.4–5.2)
CALCIUM SERPL-MCNC: 8.8 MG/DL (ref 8.8–10.2)
CHLORIDE SERPL-SCNC: 107 MMOL/L (ref 98–107)
CREAT SERPL-MCNC: 0.6 MG/DL (ref 0.67–1.17)
DEPRECATED HCO3 PLAS-SCNC: 18 MMOL/L (ref 22–29)
ERYTHROCYTE [DISTWIDTH] IN BLOOD BY AUTOMATED COUNT: 17.7 % (ref 10–15)
GFR SERPL CREATININE-BSD FRML MDRD: >90 ML/MIN/1.73M2
GLUCOSE SERPL-MCNC: 103 MG/DL (ref 70–99)
HCT VFR BLD AUTO: 31.6 % (ref 40–53)
HGB BLD-MCNC: 10.8 G/DL (ref 13.3–17.7)
HGB BLD-MCNC: 9.8 G/DL (ref 13.3–17.7)
HGB BLD-MCNC: 9.8 G/DL (ref 13.3–17.7)
INR PPP: 2.01 (ref 0.85–1.15)
LACTATE SERPL-SCNC: 1.2 MMOL/L (ref 0.7–2)
MAGNESIUM SERPL-MCNC: 2 MG/DL (ref 1.7–2.3)
MCH RBC QN AUTO: 29.3 PG (ref 26.5–33)
MCHC RBC AUTO-ENTMCNC: 31 G/DL (ref 31.5–36.5)
MCV RBC AUTO: 95 FL (ref 78–100)
PHOSPHATE SERPL-MCNC: 3.5 MG/DL (ref 2.5–4.5)
PLATELET # BLD AUTO: 280 10E3/UL (ref 150–450)
POTASSIUM SERPL-SCNC: 4 MMOL/L (ref 3.4–5.3)
PROT SERPL-MCNC: 6.3 G/DL (ref 6.4–8.3)
RBC # BLD AUTO: 3.34 10E6/UL (ref 4.4–5.9)
SODIUM SERPL-SCNC: 135 MMOL/L (ref 136–145)
UFH PPP CHRO-ACNC: 0.19 IU/ML
UFH PPP CHRO-ACNC: 0.29 IU/ML
WBC # BLD AUTO: 9.4 10E3/UL (ref 4–11)

## 2022-08-12 PROCEDURE — 97530 THERAPEUTIC ACTIVITIES: CPT | Mod: GP

## 2022-08-12 PROCEDURE — 93005 ELECTROCARDIOGRAM TRACING: CPT

## 2022-08-12 PROCEDURE — 71045 X-RAY EXAM CHEST 1 VIEW: CPT | Mod: 26 | Performed by: RADIOLOGY

## 2022-08-12 PROCEDURE — 84100 ASSAY OF PHOSPHORUS: CPT | Performed by: INTERNAL MEDICINE

## 2022-08-12 PROCEDURE — 258N000003 HC RX IP 258 OP 636: Performed by: STUDENT IN AN ORGANIZED HEALTH CARE EDUCATION/TRAINING PROGRAM

## 2022-08-12 PROCEDURE — 250N000011 HC RX IP 250 OP 636: Performed by: SURGERY

## 2022-08-12 PROCEDURE — 250N000013 HC RX MED GY IP 250 OP 250 PS 637: Performed by: STUDENT IN AN ORGANIZED HEALTH CARE EDUCATION/TRAINING PROGRAM

## 2022-08-12 PROCEDURE — 93010 ELECTROCARDIOGRAM REPORT: CPT | Performed by: INTERNAL MEDICINE

## 2022-08-12 PROCEDURE — 250N000011 HC RX IP 250 OP 636: Performed by: STUDENT IN AN ORGANIZED HEALTH CARE EDUCATION/TRAINING PROGRAM

## 2022-08-12 PROCEDURE — 97530 THERAPEUTIC ACTIVITIES: CPT | Mod: GO | Performed by: OCCUPATIONAL THERAPIST

## 2022-08-12 PROCEDURE — 85027 COMPLETE CBC AUTOMATED: CPT | Performed by: INTERNAL MEDICINE

## 2022-08-12 PROCEDURE — 999N000127 HC STATISTIC PERIPHERAL IV START W US GUIDANCE

## 2022-08-12 PROCEDURE — 250N000011 HC RX IP 250 OP 636

## 2022-08-12 PROCEDURE — 83605 ASSAY OF LACTIC ACID: CPT | Performed by: INTERNAL MEDICINE

## 2022-08-12 PROCEDURE — 36415 COLL VENOUS BLD VENIPUNCTURE: CPT | Performed by: INTERNAL MEDICINE

## 2022-08-12 PROCEDURE — 92526 ORAL FUNCTION THERAPY: CPT | Mod: GN

## 2022-08-12 PROCEDURE — 250N000013 HC RX MED GY IP 250 OP 250 PS 637: Performed by: SURGERY

## 2022-08-12 PROCEDURE — 82374 ASSAY BLOOD CARBON DIOXIDE: CPT | Performed by: INTERNAL MEDICINE

## 2022-08-12 PROCEDURE — 83735 ASSAY OF MAGNESIUM: CPT | Performed by: INTERNAL MEDICINE

## 2022-08-12 PROCEDURE — C9113 INJ PANTOPRAZOLE SODIUM, VIA: HCPCS | Performed by: STUDENT IN AN ORGANIZED HEALTH CARE EDUCATION/TRAINING PROGRAM

## 2022-08-12 PROCEDURE — 82330 ASSAY OF CALCIUM: CPT | Performed by: INTERNAL MEDICINE

## 2022-08-12 PROCEDURE — 250N000013 HC RX MED GY IP 250 OP 250 PS 637: Performed by: PHYSICIAN ASSISTANT

## 2022-08-12 PROCEDURE — 250N000013 HC RX MED GY IP 250 OP 250 PS 637: Performed by: INTERNAL MEDICINE

## 2022-08-12 PROCEDURE — 82435 ASSAY OF BLOOD CHLORIDE: CPT | Performed by: INTERNAL MEDICINE

## 2022-08-12 PROCEDURE — 85018 HEMOGLOBIN: CPT | Performed by: STUDENT IN AN ORGANIZED HEALTH CARE EDUCATION/TRAINING PROGRAM

## 2022-08-12 PROCEDURE — 214N000001 HC R&B CCU UMMC

## 2022-08-12 PROCEDURE — 99233 SBSQ HOSP IP/OBS HIGH 50: CPT | Mod: GC | Performed by: INTERNAL MEDICINE

## 2022-08-12 PROCEDURE — 85520 HEPARIN ASSAY: CPT | Performed by: INTERNAL MEDICINE

## 2022-08-12 PROCEDURE — 85610 PROTHROMBIN TIME: CPT | Performed by: INTERNAL MEDICINE

## 2022-08-12 PROCEDURE — 71045 X-RAY EXAM CHEST 1 VIEW: CPT

## 2022-08-12 RX ORDER — BUMETANIDE 0.25 MG/ML
3 INJECTION INTRAMUSCULAR; INTRAVENOUS ONCE
Status: COMPLETED | OUTPATIENT
Start: 2022-08-12 | End: 2022-08-12

## 2022-08-12 RX ORDER — CAPTOPRIL 12.5 MG/1
12.5 TABLET ORAL 3 TIMES DAILY
Status: DISCONTINUED | OUTPATIENT
Start: 2022-08-12 | End: 2022-08-20

## 2022-08-12 RX ADMIN — SALINE NASAL SPRAY 2 SPRAY: 1.5 SOLUTION NASAL at 21:00

## 2022-08-12 RX ADMIN — NYSTATIN 1000000 UNITS: 100000 SUSPENSION ORAL at 16:54

## 2022-08-12 RX ADMIN — HYDRALAZINE HYDROCHLORIDE 10 MG: 20 INJECTION INTRAMUSCULAR; INTRAVENOUS at 14:28

## 2022-08-12 RX ADMIN — ACETAMINOPHEN 975 MG: 325 TABLET, FILM COATED ORAL at 12:45

## 2022-08-12 RX ADMIN — PROCHLORPERAZINE EDISYLATE 10 MG: 5 INJECTION INTRAMUSCULAR; INTRAVENOUS at 16:04

## 2022-08-12 RX ADMIN — HYDROXYCHLOROQUINE SULFATE 200 MG: 200 TABLET, FILM COATED ORAL at 08:05

## 2022-08-12 RX ADMIN — HYDROXYCHLOROQUINE SULFATE 200 MG: 200 TABLET, FILM COATED ORAL at 21:00

## 2022-08-12 RX ADMIN — CAPTOPRIL 12.5 MG: 12.5 TABLET ORAL at 20:59

## 2022-08-12 RX ADMIN — HEPARIN SODIUM 1150 UNITS/HR: 10000 INJECTION, SOLUTION INTRAVENOUS at 00:32

## 2022-08-12 RX ADMIN — SALINE NASAL SPRAY 2 SPRAY: 1.5 SOLUTION NASAL at 04:42

## 2022-08-12 RX ADMIN — PANTOPRAZOLE SODIUM 40 MG: 40 INJECTION, POWDER, FOR SOLUTION INTRAVENOUS at 08:06

## 2022-08-12 RX ADMIN — SALINE NASAL SPRAY 2 SPRAY: 1.5 SOLUTION NASAL at 23:53

## 2022-08-12 RX ADMIN — BUMETANIDE 3 MG: 1 TABLET ORAL at 09:01

## 2022-08-12 RX ADMIN — HEPARIN SODIUM 1300 UNITS/HR: 10000 INJECTION, SOLUTION INTRAVENOUS at 13:59

## 2022-08-12 RX ADMIN — HYDRALAZINE HYDROCHLORIDE 25 MG: 25 TABLET, FILM COATED ORAL at 21:01

## 2022-08-12 RX ADMIN — CAPTOPRIL 12.5 MG: 12.5 TABLET ORAL at 09:02

## 2022-08-12 RX ADMIN — Medication 10 MG: at 21:01

## 2022-08-12 RX ADMIN — BUMETANIDE 3 MG: 0.25 INJECTION, SOLUTION INTRAMUSCULAR; INTRAVENOUS at 06:59

## 2022-08-12 RX ADMIN — QUETIAPINE FUMARATE 150 MG: 50 TABLET ORAL at 21:01

## 2022-08-12 RX ADMIN — CEFEPIME HYDROCHLORIDE 2 G: 2 INJECTION, POWDER, FOR SOLUTION INTRAVENOUS at 04:48

## 2022-08-12 RX ADMIN — BUMETANIDE 3 MG: 1 TABLET ORAL at 16:54

## 2022-08-12 RX ADMIN — ATORVASTATIN CALCIUM 40 MG: 40 TABLET, FILM COATED ORAL at 20:59

## 2022-08-12 RX ADMIN — SALINE NASAL SPRAY 2 SPRAY: 1.5 SOLUTION NASAL at 12:47

## 2022-08-12 RX ADMIN — FLUTICASONE FUROATE AND VILANTEROL TRIFENATATE 1 PUFF: 100; 25 POWDER RESPIRATORY (INHALATION) at 08:02

## 2022-08-12 RX ADMIN — ACETAMINOPHEN 975 MG: 325 TABLET, FILM COATED ORAL at 05:23

## 2022-08-12 RX ADMIN — NYSTATIN 1000000 UNITS: 100000 SUSPENSION ORAL at 12:47

## 2022-08-12 RX ADMIN — KETOCONAZOLE: 20 CREAM TOPICAL at 18:07

## 2022-08-12 RX ADMIN — QUETIAPINE FUMARATE 25 MG: 25 TABLET ORAL at 08:05

## 2022-08-12 RX ADMIN — QUETIAPINE FUMARATE 25 MG: 25 TABLET ORAL at 16:54

## 2022-08-12 RX ADMIN — DIGOXIN 125 MCG: 125 TABLET ORAL at 08:04

## 2022-08-12 RX ADMIN — VANCOMYCIN HYDROCHLORIDE 750 MG: 10 INJECTION, POWDER, LYOPHILIZED, FOR SOLUTION INTRAVENOUS at 06:25

## 2022-08-12 RX ADMIN — ACETAMINOPHEN 975 MG: 325 TABLET, FILM COATED ORAL at 21:01

## 2022-08-12 RX ADMIN — THERA TABS 1 TABLET: TAB at 08:05

## 2022-08-12 RX ADMIN — HYDRALAZINE HYDROCHLORIDE 25 MG: 25 TABLET, FILM COATED ORAL at 05:23

## 2022-08-12 RX ADMIN — CAPTOPRIL 12.5 MG: 12.5 TABLET ORAL at 12:48

## 2022-08-12 RX ADMIN — SERTRALINE HYDROCHLORIDE 100 MG: 100 TABLET ORAL at 08:07

## 2022-08-12 RX ADMIN — PANTOPRAZOLE SODIUM 40 MG: 40 INJECTION, POWDER, FOR SOLUTION INTRAVENOUS at 21:00

## 2022-08-12 RX ADMIN — HYDROXYZINE HYDROCHLORIDE 10 MG: 10 TABLET ORAL at 05:25

## 2022-08-12 RX ADMIN — NYSTATIN 1000000 UNITS: 100000 SUSPENSION ORAL at 08:06

## 2022-08-12 RX ADMIN — SALINE NASAL SPRAY 2 SPRAY: 1.5 SOLUTION NASAL at 16:55

## 2022-08-12 RX ADMIN — SALINE NASAL SPRAY 2 SPRAY: 1.5 SOLUTION NASAL at 08:07

## 2022-08-12 RX ADMIN — HYDRALAZINE HYDROCHLORIDE 25 MG: 25 TABLET, FILM COATED ORAL at 12:45

## 2022-08-12 ASSESSMENT — ACTIVITIES OF DAILY LIVING (ADL)
ADLS_ACUITY_SCORE: 40
ADLS_ACUITY_SCORE: 38
ADLS_ACUITY_SCORE: 40
ADLS_ACUITY_SCORE: 38
ADLS_ACUITY_SCORE: 36
ADLS_ACUITY_SCORE: 36
ADLS_ACUITY_SCORE: 40

## 2022-08-12 NOTE — PROGRESS NOTES
D: Stopped by to see patient. Pt is alert and anxious to start VAD education.   I: Discussed POC and provided support and listened to patient and caregiver's thoughts and concerns. Made plan to start VAD education on 8/15, at 1pm in pt's room. Pt's son and his fiance will be present. Pt's daughter will be included virtually. Education materials provided to pt.   P: Continue to follow patient and address any questions or concerns patient and or caregiver may have.

## 2022-08-12 NOTE — PROGRESS NOTES
"CLINICAL NUTRITION SERVICES     Nutrition Prescription    RECOMMENDATIONS FOR MDs/PROVIDERS TO ORDER:  1. Rec liberalize diet to a 3 g sodium vs regular diet to help encourage oral intake.   2. If nausea worsens, consider scheduling antiemetics/antinauseants ~20 minutes prior to meals to help optimize nausea control.    Malnutrition Status:    See prior RD notes.    Recommendations already ordered by Registered Dietitian (RD):  Modified oral supplements.     Future/Additional Recommendations:  See prior RD notes.  Rec small, frequent meals and intake of oral supplements     Checking oral intake and oral supplements    Diet: Cardiac since 8/9  Oral supplements: Chocolate Ensure Enlive at 10:00 and 14:00.   Intake: Pt consuming 0-50% with a poor appetite 8/9, 0-25% with a  poor appetite 8/10, and two bites of oatmeal on 8/12. Per RN on 8/11, \"Nausea better today and appetite slowly improving.\"    INTERVENTIONS:  Implementation:  Medical food supplement therapy: Discussed oral supplements with pt. Pt states he likes the chocolate Ensure and is drinking these. Modified timing to breakfast and supper, per pt request.   Nutrition education for nutrition relationship to health/disease (pt and son): Encouraged oral intake and intake of oral supplements. Explained rationale.     Follow up/Monitoring:  Will continue to follow pt.    Bridget Wade, MS, RD, LD, [facility]   6C Pgr: [phone]           "

## 2022-08-12 NOTE — PLAN OF CARE
AO X 4, shortness of breath, fine crackles in bases of lungs, simple oxygen mask 2 L, RR 22, sinus to sinus tach, VAD numbers WDL / alarms in place / dressing changed, heparin IV running at 1300 unit(s) / hour with 1600 lab recheck, MAPs 90's to low 100's, goal under 90, oral hydralazine and captopril given. Good UOP. Axillary temp 96's - 95's, C2 informed and aware. Cefapime Q 8 and vanco q 12. Continuing to monitor. Plan: Discharge to TCU when medically stable. Son in room.    IV Hydralazine 10 mg X 1, doppler MAP 84.

## 2022-08-12 NOTE — PROGRESS NOTES
VAD dressing change recommendations:   Pt reports redness/irritation under adhesive from driveline dressing. If redness persists or worsens, please stop using adhesive in kit and instead use 3M Medipore tape to secure borders. Can also switch skin prep to cavilon no sting barrier wand.   For questions or concerns about driveline exit site, please page VAD coordinator on call at job code [phone].

## 2022-08-12 NOTE — PROGRESS NOTES
[facility]   Cards 2 - Progress Note    Dandy Sands MRN: [id number]  Age: 61 year old, : 1960  Date: 2022      Assessment and Plan:  Dandy Sands is a 61 year old male with PMH of lupus, antiphospholipid syndrome, HTN, HLD, HFrEF 2/2 ICM who presented with cardiogenic shock c/b multiorgan failure (JOSE, shock liver) requiring mechanical support with IABP, now s/p HM3 LVAD 22 by Dr. Zamora with intraoperative course c/b RV failure s/p 29F Protek duo via RIJ. Post op course c/b hemopericardium s/p repeat washout with chest left open. Chest closed 22 and decannulated from RVAD . Developed epistaxis on  night which was packed by ENT. Unfortunately bleeding persisted and there was concern for airway protection on  AM which prompted intubation and transfer to ICU. Patient has since been transferred to the floor and removed his own nasal packing in the evening .     Changes today:  -Continue low-intensity heparin drip  -Bumex 3mg PO BID for diuersis    Neurology  #Insomnia/Delirium  Some concern that he had a fall overnight on . Discussed with nursing staff and he did NOT have a fall, although he did try to get out of bed. CT head was obtained () and it was unremarkable.  -Per psych recs:   - discontinue olanzapine   - Sertraline and Quetiapine per psych recs    Cardiovascular  #HFrEF 2/2 ICM s/p HM3 LVAD in setting of cardiogenic shock (SCAI D)  #RV failure s/p Protek duo temporary RVAD s/p decannulation   #RV thrombus  #Post chest closure hemopericardium s/p repeat washout ()  #PMH CAD s/p PCI to LAD ()  #S/p CRT-D ()  Patient with ICM s/p HM3 LVAD 22 by Dr. Zamora in setting of presentation for cardiogenic shock requiring MCS with IABP as prior to HM (initially evaluated for heart transplant, however noted to have substantially elevated DSAs during course of care, so decision made to proceed with LVAD given patient already  on temporary MCS). Intraoperative course c/b RV failure resulting in cardiogenic shock requiring high dose pressors necessitating RVAD support. Initial post-op course c/b hemopericardium requiring repeat washout with chest left open, however has since recovered well with decannulation on 7/21 with extubation day prior. Has continued to tolerate well from hemodynamic and end organ standpoint.   -LVAD: continues to have good flows, no alarms and stable end organ perfusion; continue speed at 5300 rpm  -continue digoxin for RV support  -AC, antiplatelets: Continue heparin gtt low-intensity; hold ASA while monitoring for epistaxis  -Volume status: Bumex 3mg PO BID  -Of note, patient with climbing capture threshold of RV lead; will need to be evaluated in future by EP team  -blood pressure: Increase Hydralazine to 25mg TID for afterload reduction; c/t Captopril 6.25mg TID    Pulmonary  #Epistaxis  #Intubated due to Concern for airway protection (8/7)-Extubated (8/8)  #Mild-moderate emphysema  #History of COVID-19  #Iatrogenic R apical PTX  H/o nasal perforation that required elective procedure (in [place]) which was postponed due to LVAD insertion. Developed epistaxis not fixed by packing overnight on 8/6 and pt continued to bleed. Intubated due to concern for ability to maintain airway from bleeding. Extubated 8/8  -Appreciate ENT assistance in managing epistaxis. Patient removed his own nasal packing overnight on 8/9.  -Cefepime/Vanc empirically for broad coverage for PNA (8/7-8/12)  -Continue Breo-Ellipta    ENT:  #Epistaxis  #Dysphonia  Controlled at bedside by ENT s/p cautery and packing. Appreciate assistance. ENT will discuss long-term plan regarding septal perforation with pt post extubation.  -Management per ENT    Gastrointestinal, Nutrition  #GERD  #Congestive hepatopathy in the setting of cardiac insuffiencey - Resolved  #Hematemesis / oral mucosal bleeding -resolved    #Dysphagia  GI consulted 7/31 for  black tarry stools and a possible GI bleed, however it is more likely swallowed blood from epistaxis that patient previously had. GI did not recommend an upper endoscopy. Recurrence of bloody stools likely due to epistaxis which have resolved. Will monitor Hb.  -Protonix 40mg IV BID  -Cleared for regular diet by SLP    Renal, Electrolytes  - Strict I/O, daily weights   - Avoid/limit nephrotoxins as able  - Replete lytes PRN per protocol  - Holding bumex today    Infectious Disease  #E.faecalis on culture from PICC line (8/2/22) - 1/2 tubes in 1/4 sets collected 8/2  #S.epidermidis (MSSE) on culture from PICC line (8/2/22) - 1/2 tubes in 1/4 sets collected 8/2  Vanc was supposed to end on 8/7 but given massive epistaxis will start broad spectrum antibiotics  -Cefepime/Vanc as above (since 8/7)    Hematology  #Anemia due to blood loss from Epistaxis-Resolved  #History of DVT and PE   #Antiphospholipid antibody syndrome  #History of iron deficiency anemia  -Transfuse to keep Hb>7  -BID Hgb today after nasal packing was removed  -Continue low-intensity heparin gtt and hold ASA while monitoring for epistaxis    Endocrinology  SSI    Rheumatology:  #SLE  - PTA meds: hydroxychloroquine 200mg, resumed 7/16  - Will need cellcept restarted when appropriate    Pertinent Lines  PIV    ICU Cares:  Feeds: On PO diet  DVT Prophylaxis: On heparin drip  GI Prophylaxis: PPI    Patient seen and discussed with Dr. Sahni.  Assessment and plan as above.    Chris Lawrence MD  Cardiology Fellow    Subjective:  Had shortness of breath overnight. Exam and CXR concerning for fluid overload. Given Bumex IV with good response.    Objective Findings:  Temp:  [96.1  F (35.6  C)-97.7  F (36.5  C)] 97.6  F (36.4  C)  Pulse:  [] 98  Resp:  [18-24] 22  BP: ()/(49-98) 111/98  SpO2:  [94 %-96 %] 94 %    Physical Exam:  GEN: Frail  HEENT: No epistaxis noted.  Pulm: Coarse breath sounds bilaterally  Cardiac: LVAD hum +  GI: soft, non  distended  Neuro: Sedated  Vascular: No lower extremity edema    Medications:    acetaminophen  975 mg Oral or Feeding Tube Q8H CAMMY     [Held by provider] aspirin  81 mg Oral or Feeding Tube Daily     atorvastatin  40 mg Oral QPM     bumetanide  3 mg Oral BID     captopril  12.5 mg Oral TID     digoxin  125 mcg Oral Daily     fluticasone-vilanterol  1 puff Inhalation Daily     hydrALAZINE  25 mg Oral Q8H CAMMY     hydroxychloroquine  200 mg Oral BID     ketoconazole   Topical Daily     lidocaine  1 patch Transdermal Q24h    And     lidocaine   Transdermal Q8H CAMMY     melatonin  10 mg Oral At Bedtime     multivitamin, therapeutic  1 tablet Oral Daily     nystatin  1,000,000 Units Swish & Swallow 4x Daily     pantoprazole (PROTONIX) IV  40 mg Intravenous BID     QUEtiapine  150 mg Oral or Feeding Tube At Bedtime     QUEtiapine  25 mg Oral BID     saline nasal   Each Nare At Bedtime     sertraline  100 mg Oral Daily     sodium chloride  2 spray Both Nostrils Q4H     [Held by provider] zolpidem  5 mg Oral At Bedtime       dextrose Stopped (08/09/22 1430)     heparin 1,300 Units/hr (08/12/22 1012)       Labs:   CMP  Recent Labs   Lab 08/12/22  0602 08/11/22  0815 08/11/22  0709 08/11/22  0443 08/10/22  0744 08/10/22  0602 08/09/22  0353 08/09/22  0341   *  --  135*  --   --  136  --  134*   POTASSIUM 4.0  --  3.8  --   --  3.8  --  3.7   CHLORIDE 107  --  107  --   --  104  --  103   CO2 18*  --  18*  --   --  22  --  23   ANIONGAP 10  --  10  --   --  10  --  8   * 105* 99 88   < > 93   < > 96   BUN 11.7  --  12.5  --   --  13.2  --  19.1   CR 0.60*  --  0.65*  --   --  0.61*  --  0.64*   GFRESTIMATED >90  --  >90  --   --  >90  --  >90   ELDA 8.8  --  9.0  --   --  9.4  --  8.8   MAG 2.0  --  2.1  --   --  2.5*  --  2.3   PHOS 3.5  --  3.6  --   --  4.0  --  4.3   PROTTOTAL 6.3*  --  6.4  --   --  6.4  --  5.8*   ALBUMIN 3.0*  --  3.0*  --   --  3.1*  --  2.9*   BILITOTAL 0.6  --  0.6  --   --  0.7  --  0.6    ALKPHOS 127  --  134*  --   --  129  --  129   AST 26  --  23  --   --  25  --  27   ALT 12  --  12  --   --  14  --  20    < > = values in this interval not displayed.     CBC  Recent Labs   Lab 08/12/22  0602 08/11/22  1631 08/11/22  0709 08/10/22  1534 08/10/22  0602   WBC 9.4  --  9.5 8.9 9.1   RBC 3.34*  --  3.37* 3.25* 3.14*   HGB 9.8*  9.8* 10.2* 9.9* 9.6*  9.6* 9.3*  9.3*   HCT 31.6*  --  31.5* 30.1* 29.0*   MCV 95  --  94 93 92   MCH 29.3  --  29.4 29.5 29.6   MCHC 31.0*  --  31.4* 31.9 32.1   RDW 17.7*  --  17.5* 17.3* 17.1*     --  285 288 266     Arterial Blood Gas  Recent Labs   Lab 08/08/22  1607 08/08/22  1333 08/08/22  1014 08/08/22  0359   PH 7.42 7.46* 7.47* 7.44   PCO2 41 37 37 40   PO2 88 87 118* 201*   HCO3 26 26 27 28   O2PER 2 40 40 50     I have reviewed today's vital signs, notes, medications, labs and imaging.  I have also seen and examined the patient and agree with the findings and plan as outlined above.  Pt with VSS but with episode of SOB.  HM3 LVAD with stable hemodynamics on low intensity heparin without coumadin at this time.  INR 2 most likely due to hepatic congestion.  Agree with continued iv diuresis.  Plan discussed with team.  Will follow closely for nose bleeds.     Darvin Sahni MD, PhD  Professor, Heart Failure and Cardiac Transplantation  [facility]

## 2022-08-12 NOTE — PLAN OF CARE
Speech Language Therapy Discharge Summary    Reason for therapy discharge:    All goals and outcomes met, no further needs identified.    Progress towards therapy goal(s). See goals on Care Plan in [facility] electronic health record for goal details.  Goals met    Therapy recommendation(s):    No further therapy is recommended.    Recommend regular texture diet and thin liquids, with pt self-selecting softer solids. Encourage pt to sit fully upright for all PO, and take small bites/sips at slow rate. Swallowing goals met; will complete orders.

## 2022-08-12 NOTE — PLAN OF CARE
D: presented 6/17 with cardiogenic shock c/b multiorgan failure (JOSE, shock liver) requiring mechanical support with IABP, now s/p HM3 LVAD 7/8/22 by Dr. Zamora   PMH: lupus, antiphospholipid syndrome, HTN, HLD, HFrEF 2/2 ICM      I: Monitored vitals and assessed pt status. Encouraged activity.   Changed: Pt reporting increased SOB and dizziness this AM. Sating >92% on RA. LS with crackles at the bases. LVAD numbers WNL. Cards crosscover notified and came to assess pt at bedside. Chest XR ordered and showed increased moderate right pleural effusion and small left pleural  Effusion. 3 mg Bumex ordered and given   Running:   - heparin 1150 units/hr; next 10A at 0800  - intermittent abx  PRN: Atarax x1     A: A&Ox4. Restless/anxious at times. Bed alarm on for pt safety. VSS on RA/2L oxymask for comfort. Tele shows SR/ST with BBB, rate 90-100s. HM3 LVAD numbers WNL, no alarms overnight, dressing CDI. Afebrile. Reported pain in lower back, managed by repositioning, heat therapy, prn and scheduled medications. Urinating adequately. +BM overnight. Up with Ax1, GB, and walker. Appeared to rest comfortably between cares.      P: Continue to monitor pt status and report changes to Cards 2 treatment team.      [id number]  Chantal Reza, RN on 8/12/2022 at 6:05 AM

## 2022-08-13 ENCOUNTER — APPOINTMENT (OUTPATIENT)
Dept: PHYSICAL THERAPY | Facility: CLINIC | Age: 62
DRG: 001 | End: 2022-08-13
Attending: INTERNAL MEDICINE
Payer: COMMERCIAL

## 2022-08-13 LAB
ALBUMIN SERPL BCG-MCNC: 3 G/DL (ref 3.5–5.2)
ALP SERPL-CCNC: 134 U/L (ref 40–129)
ALT SERPL W P-5'-P-CCNC: 8 U/L (ref 10–50)
ANION GAP SERPL CALCULATED.3IONS-SCNC: 12 MMOL/L (ref 7–15)
AST SERPL W P-5'-P-CCNC: 21 U/L (ref 10–50)
BILIRUB SERPL-MCNC: 0.6 MG/DL
BUN SERPL-MCNC: 12.2 MG/DL (ref 8–23)
CA-I BLD-MCNC: 4.5 MG/DL (ref 4.4–5.2)
CALCIUM SERPL-MCNC: 8.8 MG/DL (ref 8.8–10.2)
CHLORIDE SERPL-SCNC: 103 MMOL/L (ref 98–107)
CREAT SERPL-MCNC: 0.69 MG/DL (ref 0.67–1.17)
DEPRECATED HCO3 PLAS-SCNC: 24 MMOL/L (ref 22–29)
ERYTHROCYTE [DISTWIDTH] IN BLOOD BY AUTOMATED COUNT: 17.6 % (ref 10–15)
GFR SERPL CREATININE-BSD FRML MDRD: >90 ML/MIN/1.73M2
GLUCOSE SERPL-MCNC: 103 MG/DL (ref 70–99)
HCT VFR BLD AUTO: 31.1 % (ref 40–53)
HGB BLD-MCNC: 10 G/DL (ref 13.3–17.7)
HGB BLD-MCNC: 10.3 G/DL (ref 13.3–17.7)
INR PPP: 1.86 (ref 0.85–1.15)
LACTATE SERPL-SCNC: 1 MMOL/L (ref 0.7–2)
MAGNESIUM SERPL-MCNC: 1.6 MG/DL (ref 1.7–2.3)
MCH RBC QN AUTO: 29.8 PG (ref 26.5–33)
MCHC RBC AUTO-ENTMCNC: 32.2 G/DL (ref 31.5–36.5)
MCV RBC AUTO: 93 FL (ref 78–100)
PHOSPHATE SERPL-MCNC: 4 MG/DL (ref 2.5–4.5)
PLATELET # BLD AUTO: 274 10E3/UL (ref 150–450)
POTASSIUM SERPL-SCNC: 2.9 MMOL/L (ref 3.4–5.3)
POTASSIUM SERPL-SCNC: 3.3 MMOL/L (ref 3.4–5.3)
POTASSIUM SERPL-SCNC: 3.9 MMOL/L (ref 3.4–5.3)
POTASSIUM SERPL-SCNC: 4.2 MMOL/L (ref 3.4–5.3)
PROT SERPL-MCNC: 6.4 G/DL (ref 6.4–8.3)
RBC # BLD AUTO: 3.36 10E6/UL (ref 4.4–5.9)
SARS-COV-2 RNA RESP QL NAA+PROBE: NEGATIVE
SODIUM SERPL-SCNC: 139 MMOL/L (ref 136–145)
UFH PPP CHRO-ACNC: 0.32 IU/ML
UFH PPP CHRO-ACNC: 0.49 IU/ML
WBC # BLD AUTO: 9.2 10E3/UL (ref 4–11)

## 2022-08-13 PROCEDURE — 99233 SBSQ HOSP IP/OBS HIGH 50: CPT | Performed by: INTERNAL MEDICINE

## 2022-08-13 PROCEDURE — 85520 HEPARIN ASSAY: CPT | Performed by: INTERNAL MEDICINE

## 2022-08-13 PROCEDURE — 250N000013 HC RX MED GY IP 250 OP 250 PS 637: Performed by: INTERNAL MEDICINE

## 2022-08-13 PROCEDURE — 84132 ASSAY OF SERUM POTASSIUM: CPT | Performed by: INTERNAL MEDICINE

## 2022-08-13 PROCEDURE — 83735 ASSAY OF MAGNESIUM: CPT | Performed by: STUDENT IN AN ORGANIZED HEALTH CARE EDUCATION/TRAINING PROGRAM

## 2022-08-13 PROCEDURE — 250N000013 HC RX MED GY IP 250 OP 250 PS 637: Performed by: STUDENT IN AN ORGANIZED HEALTH CARE EDUCATION/TRAINING PROGRAM

## 2022-08-13 PROCEDURE — 36415 COLL VENOUS BLD VENIPUNCTURE: CPT | Performed by: INTERNAL MEDICINE

## 2022-08-13 PROCEDURE — 214N000001 HC R&B CCU UMMC

## 2022-08-13 PROCEDURE — 250N000013 HC RX MED GY IP 250 OP 250 PS 637: Performed by: PHYSICIAN ASSISTANT

## 2022-08-13 PROCEDURE — 250N000011 HC RX IP 250 OP 636: Performed by: STUDENT IN AN ORGANIZED HEALTH CARE EDUCATION/TRAINING PROGRAM

## 2022-08-13 PROCEDURE — 84132 ASSAY OF SERUM POTASSIUM: CPT

## 2022-08-13 PROCEDURE — 85610 PROTHROMBIN TIME: CPT | Performed by: INTERNAL MEDICINE

## 2022-08-13 PROCEDURE — 84100 ASSAY OF PHOSPHORUS: CPT | Performed by: INTERNAL MEDICINE

## 2022-08-13 PROCEDURE — 97530 THERAPEUTIC ACTIVITIES: CPT | Mod: GP

## 2022-08-13 PROCEDURE — 80053 COMPREHEN METABOLIC PANEL: CPT | Performed by: INTERNAL MEDICINE

## 2022-08-13 PROCEDURE — 83605 ASSAY OF LACTIC ACID: CPT | Performed by: INTERNAL MEDICINE

## 2022-08-13 PROCEDURE — 93005 ELECTROCARDIOGRAM TRACING: CPT

## 2022-08-13 PROCEDURE — 85027 COMPLETE CBC AUTOMATED: CPT | Performed by: INTERNAL MEDICINE

## 2022-08-13 PROCEDURE — 93010 ELECTROCARDIOGRAM REPORT: CPT | Performed by: INTERNAL MEDICINE

## 2022-08-13 PROCEDURE — 97116 GAIT TRAINING THERAPY: CPT | Mod: GP

## 2022-08-13 PROCEDURE — 85018 HEMOGLOBIN: CPT | Performed by: STUDENT IN AN ORGANIZED HEALTH CARE EDUCATION/TRAINING PROGRAM

## 2022-08-13 PROCEDURE — 82330 ASSAY OF CALCIUM: CPT | Performed by: INTERNAL MEDICINE

## 2022-08-13 PROCEDURE — 250N000013 HC RX MED GY IP 250 OP 250 PS 637: Performed by: SURGERY

## 2022-08-13 PROCEDURE — U0005 INFEC AGEN DETEC AMPLI PROBE: HCPCS | Performed by: STUDENT IN AN ORGANIZED HEALTH CARE EDUCATION/TRAINING PROGRAM

## 2022-08-13 PROCEDURE — C9113 INJ PANTOPRAZOLE SODIUM, VIA: HCPCS | Performed by: STUDENT IN AN ORGANIZED HEALTH CARE EDUCATION/TRAINING PROGRAM

## 2022-08-13 RX ORDER — POTASSIUM CHLORIDE 20MEQ/15ML
40 LIQUID (ML) ORAL ONCE
Status: COMPLETED | OUTPATIENT
Start: 2022-08-13 | End: 2022-08-13

## 2022-08-13 RX ORDER — POTASSIUM CHLORIDE 1500 MG/1
80 TABLET, EXTENDED RELEASE ORAL ONCE
Status: DISCONTINUED | OUTPATIENT
Start: 2022-08-13 | End: 2022-08-14

## 2022-08-13 RX ORDER — POTASSIUM CHLORIDE 20MEQ/15ML
20 LIQUID (ML) ORAL ONCE
Status: COMPLETED | OUTPATIENT
Start: 2022-08-13 | End: 2022-08-13

## 2022-08-13 RX ORDER — MAGNESIUM OXIDE 400 MG/1
400 TABLET ORAL EVERY 4 HOURS
Status: COMPLETED | OUTPATIENT
Start: 2022-08-13 | End: 2022-08-13

## 2022-08-13 RX ADMIN — HYDRALAZINE HYDROCHLORIDE 25 MG: 25 TABLET, FILM COATED ORAL at 11:56

## 2022-08-13 RX ADMIN — FLUTICASONE FUROATE AND VILANTEROL TRIFENATATE 1 PUFF: 100; 25 POWDER RESPIRATORY (INHALATION) at 07:56

## 2022-08-13 RX ADMIN — QUETIAPINE FUMARATE 25 MG: 25 TABLET ORAL at 07:55

## 2022-08-13 RX ADMIN — Medication 400 MG: at 08:12

## 2022-08-13 RX ADMIN — ACETAMINOPHEN 975 MG: 325 TABLET, FILM COATED ORAL at 11:43

## 2022-08-13 RX ADMIN — CAPTOPRIL 12.5 MG: 12.5 TABLET ORAL at 07:55

## 2022-08-13 RX ADMIN — NYSTATIN 1000000 UNITS: 100000 SUSPENSION ORAL at 11:56

## 2022-08-13 RX ADMIN — SALINE NASAL SPRAY 2 SPRAY: 1.5 SOLUTION NASAL at 07:56

## 2022-08-13 RX ADMIN — BUMETANIDE 3 MG: 1 TABLET ORAL at 07:55

## 2022-08-13 RX ADMIN — SALINE NASAL SPRAY 2 SPRAY: 1.5 SOLUTION NASAL at 23:38

## 2022-08-13 RX ADMIN — ACETAMINOPHEN 975 MG: 325 TABLET, FILM COATED ORAL at 21:29

## 2022-08-13 RX ADMIN — THERA TABS 1 TABLET: TAB at 07:55

## 2022-08-13 RX ADMIN — ATORVASTATIN CALCIUM 40 MG: 40 TABLET, FILM COATED ORAL at 21:30

## 2022-08-13 RX ADMIN — POTASSIUM CHLORIDE 20 MEQ: 20 SOLUTION ORAL at 10:18

## 2022-08-13 RX ADMIN — DIGOXIN 125 MCG: 125 TABLET ORAL at 07:55

## 2022-08-13 RX ADMIN — SALINE NASAL SPRAY 2 SPRAY: 1.5 SOLUTION NASAL at 04:03

## 2022-08-13 RX ADMIN — HYDRALAZINE HYDROCHLORIDE 25 MG: 25 TABLET, FILM COATED ORAL at 19:55

## 2022-08-13 RX ADMIN — HEPARIN SODIUM 1300 UNITS/HR: 10000 INJECTION, SOLUTION INTRAVENOUS at 07:52

## 2022-08-13 RX ADMIN — SALINE NASAL SPRAY 2 SPRAY: 1.5 SOLUTION NASAL at 19:55

## 2022-08-13 RX ADMIN — QUETIAPINE FUMARATE 25 MG: 25 TABLET ORAL at 16:00

## 2022-08-13 RX ADMIN — CAPTOPRIL 12.5 MG: 12.5 TABLET ORAL at 19:55

## 2022-08-13 RX ADMIN — PANTOPRAZOLE SODIUM 40 MG: 40 INJECTION, POWDER, FOR SOLUTION INTRAVENOUS at 07:55

## 2022-08-13 RX ADMIN — HYDROXYCHLOROQUINE SULFATE 200 MG: 200 TABLET, FILM COATED ORAL at 19:55

## 2022-08-13 RX ADMIN — SALINE NASAL SPRAY 2 SPRAY: 1.5 SOLUTION NASAL at 11:43

## 2022-08-13 RX ADMIN — HYDRALAZINE HYDROCHLORIDE 25 MG: 25 TABLET, FILM COATED ORAL at 04:03

## 2022-08-13 RX ADMIN — SALINE NASAL SPRAY 2 SPRAY: 1.5 SOLUTION NASAL at 16:01

## 2022-08-13 RX ADMIN — POTASSIUM CHLORIDE 40 MEQ: 20 SOLUTION ORAL at 10:18

## 2022-08-13 RX ADMIN — NYSTATIN 1000000 UNITS: 100000 SUSPENSION ORAL at 07:54

## 2022-08-13 RX ADMIN — HYDROXYCHLOROQUINE SULFATE 200 MG: 200 TABLET, FILM COATED ORAL at 07:55

## 2022-08-13 RX ADMIN — Medication 400 MG: at 11:56

## 2022-08-13 RX ADMIN — PANTOPRAZOLE SODIUM 40 MG: 40 INJECTION, POWDER, FOR SOLUTION INTRAVENOUS at 21:30

## 2022-08-13 RX ADMIN — POTASSIUM CHLORIDE 40 MEQ: 40 SOLUTION ORAL at 16:53

## 2022-08-13 RX ADMIN — QUETIAPINE FUMARATE 150 MG: 50 TABLET ORAL at 21:30

## 2022-08-13 RX ADMIN — Medication 10 MG: at 21:30

## 2022-08-13 RX ADMIN — CAPTOPRIL 12.5 MG: 12.5 TABLET ORAL at 13:00

## 2022-08-13 RX ADMIN — SERTRALINE HYDROCHLORIDE 100 MG: 100 TABLET ORAL at 07:55

## 2022-08-13 ASSESSMENT — ACTIVITIES OF DAILY LIVING (ADL)
ADLS_ACUITY_SCORE: 36
ADLS_ACUITY_SCORE: 36
ADLS_ACUITY_SCORE: 38
ADLS_ACUITY_SCORE: 36

## 2022-08-13 NOTE — PLAN OF CARE
"BP (!) 107/91   Pulse 102   Temp 97.5  F (36.4  C) (Axillary)   Resp 20   Ht 1.702 m (5' 7\")   Wt 63.9 kg (140 lb 12.8 oz)   SpO2 95%   BMI 22.05 kg/m      AO X 4 with intermittent confusion, VSS, sinus to sinus tach, 95% on RA, lung sounds clear and diminished, wt down from yesterday, no edema noted, VAD alarms checked and in place, VAD numbers WDL, VAD dressing changed, heparin therapeutic running at 1300 unit(s) / hour with AM recheck, dizzy when standing with walker, k and mag replaced with phos AM recheck. Denies pain. Incontinent of bowel X 1; continent BM X 1. Poor appetite.    Patient walked with PT from bed to sunroom. Wheelchair ride back to room where he walked to the bed. O2 sats down to 88% while standing and walking, quickly recovered to 95% on room air. Reports some dizziness, unsteadiness while walking.  "

## 2022-08-13 NOTE — PROGRESS NOTES
[facility]   Cards 2 - Progress Note    Dandy Sands MRN: [id number]  Age: 61 year old, : 1960  Date: 2022      Assessment and Plan:  Dandy Sands is a 61 year old male with PMH of lupus, antiphospholipid syndrome, HTN, HLD, HFrEF 2/2 ICM who presented with cardiogenic shock c/b multiorgan failure (JOSE, shock liver) requiring mechanical support with IABP, now s/p HM3 LVAD 22 by Dr. Zamora with intraoperative course c/b RV failure s/p 29F Protek duo via RIJ. Post op course c/b hemopericardium s/p repeat washout with chest left open. Chest closed 22 and decannulated from RVAD . Developed epistaxis on  night which was packed by ENT. Unfortunately bleeding persisted and there was concern for airway protection on  AM which prompted intubation and transfer to ICU. Patient has since been transferred to the floor and removed his own nasal packing in the evening .     Changes today:  - Continue low-intensity heparin drip  - Bumex 3mg this AM; likely to give PM dose depending on response  - Replete K aggressively (severely depleted after aggressive diuresis yesterday); will check K TID today    Neurology  #Insomnia/Delirium  Some concern that he had a fall overnight on . Discussed with nursing staff and he did NOT have a fall, although he did try to get out of bed. CT head was obtained () and it was unremarkable.  -Per psych recs:   - discontinue olanzapine   - Sertraline and Quetiapine per psych recs    Cardiovascular  #HFrEF 2/2 ICM s/p HM3 LVAD in setting of cardiogenic shock (SCAI D)  #RV failure s/p Protek duo temporary RVAD s/p decannulation   #RV thrombus  #Post chest closure hemopericardium s/p repeat washout ()  #PMH CAD s/p PCI to LAD ()  #S/p CRT-D ()  Patient with ICM s/p HM3 LVAD 22 by Dr. Zamora in setting of presentation for cardiogenic shock requiring MCS with IABP as prior to HM (initially evaluated for heart  transplant, however noted to have substantially elevated DSAs during course of care, so decision made to proceed with LVAD given patient already on temporary MCS). Intraoperative course c/b RV failure resulting in cardiogenic shock requiring high dose pressors necessitating RVAD support. Initial post-op course c/b hemopericardium requiring repeat washout with chest left open, however has since recovered well with decannulation on 7/21 with extubation day prior. Has continued to tolerate well from hemodynamic and end organ standpoint.   -LVAD: continues to have good flows, no alarms and stable end organ perfusion; continue speed at 5300 rpm  -continue digoxin for RV support  -AC, antiplatelets: Continue heparin gtt low-intensity; hold ASA while monitoring for epistaxis  -Volume status: Bumex 3mg PO x1 this AM; monitor response for possible redose this afternoon  -Of note, patient with climbing capture threshold of RV lead; will need to be evaluated in future by EP team  -blood pressure: Increase Hydralazine to 25mg TID for afterload reduction; c/t Captopril 6.25mg TID    Pulmonary  #Epistaxis  #Intubated due to Concern for airway protection (8/7)-Extubated (8/8)  #Mild-moderate emphysema  #History of COVID-19  #Iatrogenic R apical PTX  H/o nasal perforation that required elective procedure (in [place]) which was postponed due to LVAD insertion. Developed epistaxis not fixed by packing overnight on 8/6 and pt continued to bleed. Intubated due to concern for ability to maintain airway from bleeding. Extubated 8/8  -Appreciate ENT assistance in managing epistaxis. Patient removed his own nasal packing overnight on 8/9.  -Cefepime/Vanc empirically for broad coverage for PNA (8/7-8/12)  -Continue Breo-Ellipta    ENT:  #Epistaxis  #Dysphonia  Controlled at bedside by ENT s/p cautery and packing. Appreciate assistance. ENT will discuss long-term plan regarding septal perforation with pt post extubation.  -Management per  ENT    Gastrointestinal, Nutrition  #GERD  #Congestive hepatopathy in the setting of cardiac insuffiencey - Resolved  #Hematemesis / oral mucosal bleeding -resolved    #Dysphagia  GI consulted 7/31 for black tarry stools and a possible GI bleed, however it is more likely swallowed blood from epistaxis that patient previously had. GI did not recommend an upper endoscopy. Recurrence of bloody stools likely due to epistaxis which have resolved. Will monitor Hb.  -Protonix 40mg IV BID  -Cleared for regular diet by SLP    Renal, Electrolytes  - Strict I/O, daily weights   - Avoid/limit nephrotoxins as able  - Replete lytes PRN per protocol  - Holding bumex today    Infectious Disease  #E.faecalis on culture from PICC line (8/2/22) - 1/2 tubes in 1/4 sets collected 8/2  #S.epidermidis (MSSE) on culture from PICC line (8/2/22) - 1/2 tubes in 1/4 sets collected 8/2  Vanc was supposed to end on 8/7 but given massive epistaxis will start broad spectrum antibiotics  -Cefepime/Vanc as above (since 8/7)    Hematology  #Anemia due to blood loss from Epistaxis-Resolved  #History of DVT and PE   #Antiphospholipid antibody syndrome  #History of iron deficiency anemia  -Transfuse to keep Hb>7  -BID Hgb today after nasal packing was removed  -Continue low-intensity heparin gtt and hold ASA while monitoring for epistaxis    Endocrinology  SSI    Rheumatology:  #SLE  - PTA meds: hydroxychloroquine 200mg, resumed 7/16  - Will need cellcept restarted when appropriate    Pertinent Lines  PIV    ICU Cares:  Feeds: On PO diet  DVT Prophylaxis: On heparin drip  GI Prophylaxis: PPI    Patient seen and discussed with Dr. Sahni.  Assessment and plan as above.    Spencer Mehta MD  PGY3 IM  Pager: [phone]    Subjective:  Still some volume overload on exam. Feels ok this morning. He is Happy with progress on diuresis so far.    Objective Findings:  Temp:  [96.8  F (36  C)-97.5  F (36.4  C)] 97.5  F (36.4  C)  Pulse:  [] 101  Resp:   [18-22] 20  BP: ()/(62-94) 102/94  SpO2:  [95 %-99 %] 95 %    Physical Exam:  GEN: Frail  HEENT: No epistaxis noted.  Pulm: Coarse breath sounds bilaterally  Cardiac: LVAD hum +  GI: soft, non distended  Neuro: Sedated  Vascular: No lower extremity edema    Medications:    acetaminophen  975 mg Oral or Feeding Tube Q8H CAMMY     [Held by provider] aspirin  81 mg Oral or Feeding Tube Daily     atorvastatin  40 mg Oral QPM     [Held by provider] bumetanide  3 mg Oral BID     captopril  12.5 mg Oral TID     digoxin  125 mcg Oral Daily     fluticasone-vilanterol  1 puff Inhalation Daily     hydrALAZINE  25 mg Oral Q8H CAMMY     hydroxychloroquine  200 mg Oral BID     ketoconazole   Topical Daily     lidocaine  1 patch Transdermal Q24h    And     lidocaine   Transdermal Q8H CAMMY     magnesium oxide  400 mg Oral Q4H     melatonin  10 mg Oral At Bedtime     multivitamin, therapeutic  1 tablet Oral Daily     nystatin  1,000,000 Units Swish & Swallow 4x Daily     pantoprazole (PROTONIX) IV  40 mg Intravenous BID     potassium chloride  80 mEq Oral Once     QUEtiapine  150 mg Oral or Feeding Tube At Bedtime     QUEtiapine  25 mg Oral BID     saline nasal   Each Nare At Bedtime     sertraline  100 mg Oral Daily     sodium chloride  2 spray Both Nostrils Q4H     [Held by provider] zolpidem  5 mg Oral At Bedtime       dextrose Stopped (08/09/22 1430)     heparin 1,300 Units/hr (08/13/22 0752)       Labs:   CMP  Recent Labs   Lab 08/13/22  0450 08/12/22  0602 08/11/22  0815 08/11/22  0709 08/10/22  0744 08/10/22  0602    135*  --  135*  --  136   POTASSIUM 2.9* 4.0  --  3.8  --  3.8   CHLORIDE 103 107  --  107  --  104   CO2 24 18*  --  18*  --  22   ANIONGAP 12 10  --  10  --  10   * 103* 105* 99   < > 93   BUN 12.2 11.7  --  12.5  --  13.2   CR 0.69 0.60*  --  0.65*  --  0.61*   GFRESTIMATED >90 >90  --  >90  --  >90   ELDA 8.8 8.8  --  9.0  --  9.4   MAG 1.6* 2.0  --  2.1  --  2.5*   PHOS 4.0 3.5  --  3.6  --  4.0    PROTTOTAL 6.4 6.3*  --  6.4  --  6.4   ALBUMIN 3.0* 3.0*  --  3.0*  --  3.1*   BILITOTAL 0.6 0.6  --  0.6  --  0.7   ALKPHOS 134* 127  --  134*  --  129   AST 21 26  --  23  --  25   ALT 8* 12  --  12  --  14    < > = values in this interval not displayed.     CBC  Recent Labs   Lab 08/13/22  0450 08/12/22  1738 08/12/22  0602 08/11/22  1631 08/11/22  0709 08/10/22  1534   WBC 9.2  --  9.4  --  9.5 8.9   RBC 3.36*  --  3.34*  --  3.37* 3.25*   HGB 10.0* 10.8* 9.8*  9.8* 10.2* 9.9* 9.6*  9.6*   HCT 31.1*  --  31.6*  --  31.5* 30.1*   MCV 93  --  95  --  94 93   MCH 29.8  --  29.3  --  29.4 29.5   MCHC 32.2  --  31.0*  --  31.4* 31.9   RDW 17.6*  --  17.7*  --  17.5* 17.3*     --  280  --  285 288     Arterial Blood Gas  Recent Labs   Lab 08/08/22  1607 08/08/22  1333 08/08/22  1014 08/08/22  0359   PH 7.42 7.46* 7.47* 7.44   PCO2 41 37 37 40   PO2 88 87 118* 201*   HCO3 26 26 27 28   O2PER 2 40 40 50       I have reviewed today's vital signs, notes, medications, labs and imaging.  I have also seen and examined the patient and agree with the findings and plan as outlined above.  Pt with HM3 placement with mild RV dysfn now following 7l UO repleting K levels.  Will continue diuresis once K is replaced and follow closely for evidence of bleeding.     Darvin Sahni MD, PhD  Professor, Heart Failure and Cardiac Transplantation  [facility]

## 2022-08-13 NOTE — PLAN OF CARE
Temp: 96.8  F (36  C) Temp src: Axillary BP: (!) 87/65 Pulse: 95   Resp: 18 SpO2: 96 % O2 Device: None (Room air)     D: Admitted with cardiogenic shock c/b multiorgan failure (JOSE, shock liver) requiring IABP support. Now s/p HM3 LVAD 7/8. Hx lupus, antiphospholipid syndrome, HTN, HLD, HFrEF 2/2 ICM     I/A: Dandy is A&O x4. Tele in place, SR. VSS on RA. VAD numbers WDL. PIV in place infusing heparin at 1300u/hr (therapeutic x1). Incontinent x1. Up with assist x1. Slept between cares overnight    P: Continue to monitor and follow POC. Plan for TCU discharge when medically ready. Notify Cards 2 with changes      Goal Outcome Evaluation:    Plan of Care Reviewed With: patient   Overall Patient Progress: improving

## 2022-08-14 ENCOUNTER — APPOINTMENT (OUTPATIENT)
Dept: OCCUPATIONAL THERAPY | Facility: CLINIC | Age: 62
DRG: 001 | End: 2022-08-14
Attending: INTERNAL MEDICINE
Payer: COMMERCIAL

## 2022-08-14 LAB
ALBUMIN SERPL BCG-MCNC: 3.1 G/DL (ref 3.5–5.2)
ALP SERPL-CCNC: 141 U/L (ref 40–129)
ALT SERPL W P-5'-P-CCNC: 8 U/L (ref 10–50)
ANION GAP SERPL CALCULATED.3IONS-SCNC: 12 MMOL/L (ref 7–15)
ANION GAP SERPL CALCULATED.3IONS-SCNC: 14 MMOL/L (ref 7–15)
AST SERPL W P-5'-P-CCNC: 22 U/L (ref 10–50)
ATRIAL RATE - MUSE: 45 BPM
BILIRUB SERPL-MCNC: 0.6 MG/DL
BUN SERPL-MCNC: 11.5 MG/DL (ref 8–23)
BUN SERPL-MCNC: 12.3 MG/DL (ref 8–23)
C DIFF TOX B STL QL: NEGATIVE
CA-I BLD-MCNC: 4.5 MG/DL (ref 4.4–5.2)
CALCIUM SERPL-MCNC: 9.2 MG/DL (ref 8.8–10.2)
CALCIUM SERPL-MCNC: 9.2 MG/DL (ref 8.8–10.2)
CHLORIDE SERPL-SCNC: 101 MMOL/L (ref 98–107)
CHLORIDE SERPL-SCNC: 103 MMOL/L (ref 98–107)
CREAT SERPL-MCNC: 0.67 MG/DL (ref 0.67–1.17)
CREAT SERPL-MCNC: 0.7 MG/DL (ref 0.67–1.17)
DEPRECATED HCO3 PLAS-SCNC: 23 MMOL/L (ref 22–29)
DEPRECATED HCO3 PLAS-SCNC: 23 MMOL/L (ref 22–29)
DIASTOLIC BLOOD PRESSURE - MUSE: NORMAL MMHG
ERYTHROCYTE [DISTWIDTH] IN BLOOD BY AUTOMATED COUNT: 17.5 % (ref 10–15)
GFR SERPL CREATININE-BSD FRML MDRD: >90 ML/MIN/1.73M2
GFR SERPL CREATININE-BSD FRML MDRD: >90 ML/MIN/1.73M2
GLUCOSE SERPL-MCNC: 89 MG/DL (ref 70–99)
GLUCOSE SERPL-MCNC: 91 MG/DL (ref 70–99)
HCT VFR BLD AUTO: 34.2 % (ref 40–53)
HGB BLD-MCNC: 10.4 G/DL (ref 13.3–17.7)
HGB BLD-MCNC: 10.4 G/DL (ref 13.3–17.7)
HGB BLD-MCNC: 11.1 G/DL (ref 13.3–17.7)
INR PPP: 1.57 (ref 0.85–1.15)
INTERPRETATION ECG - MUSE: NORMAL
LACTATE SERPL-SCNC: 1.1 MMOL/L (ref 0.7–2)
MAGNESIUM SERPL-MCNC: 1.8 MG/DL (ref 1.7–2.3)
MCH RBC QN AUTO: 28.7 PG (ref 26.5–33)
MCHC RBC AUTO-ENTMCNC: 30.4 G/DL (ref 31.5–36.5)
MCV RBC AUTO: 95 FL (ref 78–100)
P AXIS - MUSE: NORMAL DEGREES
PHOSPHATE SERPL-MCNC: 3.8 MG/DL (ref 2.5–4.5)
PLATELET # BLD AUTO: 267 10E3/UL (ref 150–450)
POTASSIUM SERPL-SCNC: 3.7 MMOL/L (ref 3.4–5.3)
POTASSIUM SERPL-SCNC: 3.9 MMOL/L (ref 3.4–5.3)
PR INTERVAL - MUSE: NORMAL MS
PROT SERPL-MCNC: 6.7 G/DL (ref 6.4–8.3)
QRS DURATION - MUSE: 158 MS
QT - MUSE: 386 MS
QTC - MUSE: 490 MS
R AXIS - MUSE: -58 DEGREES
RBC # BLD AUTO: 3.62 10E6/UL (ref 4.4–5.9)
SODIUM SERPL-SCNC: 138 MMOL/L (ref 136–145)
SODIUM SERPL-SCNC: 138 MMOL/L (ref 136–145)
SYSTOLIC BLOOD PRESSURE - MUSE: NORMAL MMHG
T AXIS - MUSE: 109 DEGREES
UFH PPP CHRO-ACNC: 0.41 IU/ML
VENTRICULAR RATE- MUSE: 97 BPM
WBC # BLD AUTO: 9.6 10E3/UL (ref 4–11)

## 2022-08-14 PROCEDURE — 36415 COLL VENOUS BLD VENIPUNCTURE: CPT | Performed by: STUDENT IN AN ORGANIZED HEALTH CARE EDUCATION/TRAINING PROGRAM

## 2022-08-14 PROCEDURE — 83605 ASSAY OF LACTIC ACID: CPT | Performed by: INTERNAL MEDICINE

## 2022-08-14 PROCEDURE — 36415 COLL VENOUS BLD VENIPUNCTURE: CPT | Performed by: INTERNAL MEDICINE

## 2022-08-14 PROCEDURE — 93010 ELECTROCARDIOGRAM REPORT: CPT | Performed by: INTERNAL MEDICINE

## 2022-08-14 PROCEDURE — 250N000013 HC RX MED GY IP 250 OP 250 PS 637: Performed by: STUDENT IN AN ORGANIZED HEALTH CARE EDUCATION/TRAINING PROGRAM

## 2022-08-14 PROCEDURE — 87493 C DIFF AMPLIFIED PROBE: CPT | Performed by: STUDENT IN AN ORGANIZED HEALTH CARE EDUCATION/TRAINING PROGRAM

## 2022-08-14 PROCEDURE — 250N000013 HC RX MED GY IP 250 OP 250 PS 637: Performed by: PHYSICIAN ASSISTANT

## 2022-08-14 PROCEDURE — 82330 ASSAY OF CALCIUM: CPT | Performed by: INTERNAL MEDICINE

## 2022-08-14 PROCEDURE — 250N000013 HC RX MED GY IP 250 OP 250 PS 637: Performed by: INTERNAL MEDICINE

## 2022-08-14 PROCEDURE — 99233 SBSQ HOSP IP/OBS HIGH 50: CPT | Performed by: INTERNAL MEDICINE

## 2022-08-14 PROCEDURE — 999N000127 HC STATISTIC PERIPHERAL IV START W US GUIDANCE

## 2022-08-14 PROCEDURE — 97530 THERAPEUTIC ACTIVITIES: CPT | Mod: GO

## 2022-08-14 PROCEDURE — 85018 HEMOGLOBIN: CPT | Performed by: STUDENT IN AN ORGANIZED HEALTH CARE EDUCATION/TRAINING PROGRAM

## 2022-08-14 PROCEDURE — 250N000011 HC RX IP 250 OP 636: Performed by: STUDENT IN AN ORGANIZED HEALTH CARE EDUCATION/TRAINING PROGRAM

## 2022-08-14 PROCEDURE — 93005 ELECTROCARDIOGRAM TRACING: CPT

## 2022-08-14 PROCEDURE — 84100 ASSAY OF PHOSPHORUS: CPT | Performed by: INTERNAL MEDICINE

## 2022-08-14 PROCEDURE — 87506 IADNA-DNA/RNA PROBE TQ 6-11: CPT | Performed by: STUDENT IN AN ORGANIZED HEALTH CARE EDUCATION/TRAINING PROGRAM

## 2022-08-14 PROCEDURE — 83735 ASSAY OF MAGNESIUM: CPT | Performed by: STUDENT IN AN ORGANIZED HEALTH CARE EDUCATION/TRAINING PROGRAM

## 2022-08-14 PROCEDURE — 250N000011 HC RX IP 250 OP 636: Performed by: SURGERY

## 2022-08-14 PROCEDURE — 85610 PROTHROMBIN TIME: CPT | Performed by: INTERNAL MEDICINE

## 2022-08-14 PROCEDURE — 85520 HEPARIN ASSAY: CPT | Performed by: STUDENT IN AN ORGANIZED HEALTH CARE EDUCATION/TRAINING PROGRAM

## 2022-08-14 PROCEDURE — 250N000013 HC RX MED GY IP 250 OP 250 PS 637: Performed by: SURGERY

## 2022-08-14 PROCEDURE — C9113 INJ PANTOPRAZOLE SODIUM, VIA: HCPCS | Performed by: STUDENT IN AN ORGANIZED HEALTH CARE EDUCATION/TRAINING PROGRAM

## 2022-08-14 PROCEDURE — 85027 COMPLETE CBC AUTOMATED: CPT | Performed by: INTERNAL MEDICINE

## 2022-08-14 PROCEDURE — 82310 ASSAY OF CALCIUM: CPT | Performed by: STUDENT IN AN ORGANIZED HEALTH CARE EDUCATION/TRAINING PROGRAM

## 2022-08-14 PROCEDURE — 80053 COMPREHEN METABOLIC PANEL: CPT | Performed by: INTERNAL MEDICINE

## 2022-08-14 PROCEDURE — 214N000001 HC R&B CCU UMMC

## 2022-08-14 RX ORDER — WARFARIN SODIUM 7.5 MG/1
7.5 TABLET ORAL
Status: COMPLETED | OUTPATIENT
Start: 2022-08-14 | End: 2022-08-14

## 2022-08-14 RX ORDER — POTASSIUM CHLORIDE 20MEQ/15ML
20 LIQUID (ML) ORAL ONCE
Status: COMPLETED | OUTPATIENT
Start: 2022-08-14 | End: 2022-08-14

## 2022-08-14 RX ORDER — POTASSIUM CHLORIDE 1.5 G/1.58G
20 POWDER, FOR SOLUTION ORAL ONCE
Status: DISCONTINUED | OUTPATIENT
Start: 2022-08-14 | End: 2022-08-16 | Stop reason: ALTCHOICE

## 2022-08-14 RX ORDER — MAGNESIUM OXIDE 400 MG/1
400 TABLET ORAL EVERY 4 HOURS
Status: COMPLETED | OUTPATIENT
Start: 2022-08-14 | End: 2022-08-14

## 2022-08-14 RX ORDER — POTASSIUM CHLORIDE 1.5 G/1.58G
20 POWDER, FOR SOLUTION ORAL ONCE
Status: COMPLETED | OUTPATIENT
Start: 2022-08-14 | End: 2022-08-14

## 2022-08-14 RX ADMIN — PROCHLORPERAZINE EDISYLATE 10 MG: 5 INJECTION INTRAMUSCULAR; INTRAVENOUS at 19:28

## 2022-08-14 RX ADMIN — HYDROXYCHLOROQUINE SULFATE 200 MG: 200 TABLET, FILM COATED ORAL at 19:36

## 2022-08-14 RX ADMIN — Medication: at 21:29

## 2022-08-14 RX ADMIN — QUETIAPINE FUMARATE 25 MG: 25 TABLET ORAL at 15:39

## 2022-08-14 RX ADMIN — DIGOXIN 125 MCG: 125 TABLET ORAL at 08:15

## 2022-08-14 RX ADMIN — CAPTOPRIL 12.5 MG: 12.5 TABLET ORAL at 19:36

## 2022-08-14 RX ADMIN — HEPARIN SODIUM 1300 UNITS/HR: 10000 INJECTION, SOLUTION INTRAVENOUS at 03:53

## 2022-08-14 RX ADMIN — HYDRALAZINE HYDROCHLORIDE 25 MG: 25 TABLET, FILM COATED ORAL at 11:35

## 2022-08-14 RX ADMIN — Medication 400 MG: at 11:36

## 2022-08-14 RX ADMIN — SALINE NASAL SPRAY 2 SPRAY: 1.5 SOLUTION NASAL at 19:34

## 2022-08-14 RX ADMIN — ATORVASTATIN CALCIUM 40 MG: 40 TABLET, FILM COATED ORAL at 19:36

## 2022-08-14 RX ADMIN — CAPTOPRIL 12.5 MG: 12.5 TABLET ORAL at 08:16

## 2022-08-14 RX ADMIN — BUMETANIDE 3 MG: 1 TABLET ORAL at 15:39

## 2022-08-14 RX ADMIN — Medication 10 MG: at 21:29

## 2022-08-14 RX ADMIN — THERA TABS 1 TABLET: TAB at 08:15

## 2022-08-14 RX ADMIN — NYSTATIN 1000000 UNITS: 100000 SUSPENSION ORAL at 11:35

## 2022-08-14 RX ADMIN — NYSTATIN 1000000 UNITS: 100000 SUSPENSION ORAL at 08:15

## 2022-08-14 RX ADMIN — HYDRALAZINE HYDROCHLORIDE 25 MG: 25 TABLET, FILM COATED ORAL at 03:15

## 2022-08-14 RX ADMIN — KETOCONAZOLE: 20 CREAM TOPICAL at 18:18

## 2022-08-14 RX ADMIN — HYDRALAZINE HYDROCHLORIDE 25 MG: 25 TABLET, FILM COATED ORAL at 19:36

## 2022-08-14 RX ADMIN — HYDROXYCHLOROQUINE SULFATE 200 MG: 200 TABLET, FILM COATED ORAL at 08:15

## 2022-08-14 RX ADMIN — SALINE NASAL SPRAY 2 SPRAY: 1.5 SOLUTION NASAL at 11:39

## 2022-08-14 RX ADMIN — CAPTOPRIL 12.5 MG: 12.5 TABLET ORAL at 13:16

## 2022-08-14 RX ADMIN — SERTRALINE HYDROCHLORIDE 100 MG: 100 TABLET ORAL at 08:15

## 2022-08-14 RX ADMIN — QUETIAPINE FUMARATE 150 MG: 50 TABLET ORAL at 21:28

## 2022-08-14 RX ADMIN — Medication 400 MG: at 08:15

## 2022-08-14 RX ADMIN — WARFARIN SODIUM 7.5 MG: 7.5 TABLET ORAL at 18:18

## 2022-08-14 RX ADMIN — PANTOPRAZOLE SODIUM 40 MG: 40 INJECTION, POWDER, FOR SOLUTION INTRAVENOUS at 19:30

## 2022-08-14 RX ADMIN — POTASSIUM CHLORIDE 20 MEQ: 1.5 POWDER, FOR SOLUTION ORAL at 14:48

## 2022-08-14 RX ADMIN — SALINE NASAL SPRAY 2 SPRAY: 1.5 SOLUTION NASAL at 15:39

## 2022-08-14 RX ADMIN — SALINE NASAL SPRAY 2 SPRAY: 1.5 SOLUTION NASAL at 03:16

## 2022-08-14 RX ADMIN — ACETAMINOPHEN 975 MG: 325 TABLET, FILM COATED ORAL at 11:39

## 2022-08-14 RX ADMIN — SALINE NASAL SPRAY 2 SPRAY: 1.5 SOLUTION NASAL at 08:17

## 2022-08-14 RX ADMIN — QUETIAPINE FUMARATE 25 MG: 25 TABLET ORAL at 08:15

## 2022-08-14 RX ADMIN — PANTOPRAZOLE SODIUM 40 MG: 40 INJECTION, POWDER, FOR SOLUTION INTRAVENOUS at 08:15

## 2022-08-14 RX ADMIN — FLUTICASONE FUROATE AND VILANTEROL TRIFENATATE 1 PUFF: 100; 25 POWDER RESPIRATORY (INHALATION) at 08:16

## 2022-08-14 RX ADMIN — BUMETANIDE 3 MG: 1 TABLET ORAL at 08:15

## 2022-08-14 RX ADMIN — HEPARIN SODIUM 1300 UNITS/HR: 10000 INJECTION, SOLUTION INTRAVENOUS at 23:03

## 2022-08-14 RX ADMIN — POTASSIUM CHLORIDE 20 MEQ: 20 SOLUTION ORAL at 09:02

## 2022-08-14 ASSESSMENT — ACTIVITIES OF DAILY LIVING (ADL)
ADLS_ACUITY_SCORE: 38

## 2022-08-14 NOTE — PLAN OF CARE
Neuro: A&Ox4. Forgetful at times.   Cardiac: SR/ST in 100s with BBB. LVAD numbers wnl. MAP 80s-100s.   Respiratory: RA. Denied shortness of breath.   GI/: Voiding via urinal. 2 loose bms this shift-last one, he was incontinent.   Diet/appetite: Poor appetite, no c/o nausea today   Activity: Up with 1 assist.   Pain: Denies. Took his scheduled tylenol  Skin: No new deficits noted. Sternal incision open to air, old CT sites scabbed over. Coccyx with redness, barrier cream applied. Mepilex intact.    Lines: PIV x 1, heparin drip infusing, recheck in am.   Replacements: K replaced, recheck in am.   Plan: continue to monitor breathing, po intake, increase activity, VAD education on Monday.

## 2022-08-14 NOTE — PROGRESS NOTES
[facility]   Cards 2 - Progress Note    Dandy Sands MRN: [id number]  Age: 61 year old, : 1960  Date: 2022      Assessment and Plan:  Dandy Sands is a 61 year old male with PMH of lupus, antiphospholipid syndrome, HTN, HLD, HFrEF 2/2 ICM who presented with cardiogenic shock c/b multiorgan failure (JOSE, shock liver) requiring mechanical support with IABP, now s/p HM3 LVAD 22 by Dr. Zamora with intraoperative course c/b RV failure s/p 29F Protek duo via RIJ. Post op course c/b hemopericardium s/p repeat washout with chest left open. Chest closed 22 and decannulated from RVAD . Developed epistaxis on  night which was packed by ENT. Unfortunately bleeding persisted and there was concern for airway protection on  AM which prompted intubation and transfer to ICU. Patient has since been transferred to the floor and removed his own nasal packing in the evening .     Changes today:  - Continue low-intensity heparin drip + start bridging to Warfarin  - PO Bumex 3mg BID  - BID BMPs with K/Mg/Phos repletion    Neurology  #Insomnia/Delirium  Some concern that he had a fall overnight on . Discussed with nursing staff and he did NOT have a fall, although he did try to get out of bed. CT head was obtained () and it was unremarkable.  -Per psych recs:   - discontinue olanzapine   - Sertraline and Quetiapine per psych recs    Cardiovascular  #HFrEF 2/2 ICM s/p HM3 LVAD in setting of cardiogenic shock (SCAI D)  #RV failure s/p Protek duo temporary RVAD s/p decannulation   #RV thrombus  #Post chest closure hemopericardium s/p repeat washout ()  #PMH CAD s/p PCI to LAD ()  #S/p CRT-D ()  Patient with ICM s/p HM3 LVAD 22 by Dr. Zamora in setting of presentation for cardiogenic shock requiring MCS with IABP as prior to HM (initially evaluated for heart transplant, however noted to have substantially elevated DSAs during course of care, so  decision made to proceed with LVAD given patient already on temporary MCS). Intraoperative course c/b RV failure resulting in cardiogenic shock requiring high dose pressors necessitating RVAD support. Initial post-op course c/b hemopericardium requiring repeat washout with chest left open, however has since recovered well with decannulation on 7/21 with extubation day prior. Has continued to tolerate well from hemodynamic and end organ standpoint.   -LVAD: continues to have good flows, no alarms and stable end organ perfusion; continue speed at 5300 rpm  -continue digoxin for RV support  -AC, antiplatelets: Continue low-intensity heparin drip + start bridging to Warfarin; hold ASA while monitoring for epistaxis  -Volume status: PO Bumex 3mg BID  -Of note, patient with climbing capture threshold of RV lead; will need to be evaluated in future by EP team  -blood pressure: Increase Hydralazine to 25mg TID for afterload reduction; c/t Captopril 6.25mg TID    Pulmonary  #Epistaxis  #Intubated due to Concern for airway protection (8/7)-Extubated (8/8)  #Mild-moderate emphysema  #History of COVID-19  #Iatrogenic R apical PTX  H/o nasal perforation that required elective procedure (in [place]) which was postponed due to LVAD insertion. Developed epistaxis not fixed by packing overnight on 8/6 and pt continued to bleed. Intubated due to concern for ability to maintain airway from bleeding. Extubated 8/8  -Appreciate ENT assistance in managing epistaxis. Patient removed his own nasal packing overnight on 8/9.  -Cefepime/Vanc empirically for broad coverage for PNA (8/7-8/12)  -Continue Breo-Ellipta    ENT:  #Epistaxis  #Dysphonia  Controlled at bedside by ENT s/p cautery and packing. Appreciate assistance. ENT will discuss long-term plan regarding septal perforation with pt post extubation.  -Management per ENT    Gastrointestinal, Nutrition  #GERD  #Congestive hepatopathy in the setting of cardiac insuffiencey -  Resolved  #Hematemesis / oral mucosal bleeding -resolved    #Dysphagia  GI consulted 7/31 for black tarry stools and a possible GI bleed, however it is more likely swallowed blood from epistaxis that patient previously had. GI did not recommend an upper endoscopy. Recurrence of bloody stools likely due to epistaxis which have resolved. Will monitor Hb.  -Protonix 40mg IV BID  -Cleared for regular diet by SLP    Renal, Electrolytes  - Strict I/O, daily weights   - Avoid/limit nephrotoxins as able  - Replete lytes PRN per protocol  - Holding bumex today    Infectious Disease  #E.faecalis on culture from PICC line (8/2/22) - 1/2 tubes in 1/4 sets collected 8/2  #S.epidermidis (MSSE) on culture from PICC line (8/2/22) - 1/2 tubes in 1/4 sets collected 8/2  Vanc was supposed to end on 8/7 but given massive epistaxis will start broad spectrum antibiotics  -Cefepime/Vanc as above (since 8/7)    Hematology  #Anemia due to blood loss from Epistaxis-Resolved  #History of DVT and PE   #Antiphospholipid antibody syndrome  #History of iron deficiency anemia  -Transfuse to keep Hb>7  -BID Hgb today after nasal packing was removed  -Continue low-intensity heparin gtt and hold ASA while monitoring for epistaxis    Endocrinology  SSI    Rheumatology:  #SLE  - PTA meds: hydroxychloroquine 200mg, resumed 7/16  - Will need cellcept restarted when appropriate    Pertinent Lines  PIV    ICU Cares:  Feeds: On PO diet  DVT Prophylaxis: On heparin drip  GI Prophylaxis: PPI    Patient seen and discussed with Dr. Sahni.  Assessment and plan as above.    Spencer Mehta MD  PGY3 IM  Pager: [phone]    Subjective:  Still some volume overload on exam. Feels ok this morning. He is happy with progress on diuresis so far.    Objective Findings:  Temp:  [97.5  F (36.4  C)-98  F (36.7  C)] 97.5  F (36.4  C)  Pulse:  [] 105  Resp:  [16-20] 16  BP: ()/(74-94) 104/94  SpO2:  [95 %-98 %] 98 %    Physical Exam:  GEN: Frail  HEENT: No  epistaxis noted.  Pulm: Coarse breath sounds bilaterally  Cardiac: LVAD hum +  GI: soft, non distended  Neuro: Sedated  Vascular: No lower extremity edema    Medications:    acetaminophen  975 mg Oral or Feeding Tube Q8H CAMMY     [Held by provider] aspirin  81 mg Oral or Feeding Tube Daily     atorvastatin  40 mg Oral QPM     bumetanide  3 mg Oral BID     captopril  12.5 mg Oral TID     digoxin  125 mcg Oral Daily     fluticasone-vilanterol  1 puff Inhalation Daily     hydrALAZINE  25 mg Oral Q8H CAMMY     hydroxychloroquine  200 mg Oral BID     ketoconazole   Topical Daily     lidocaine  1 patch Transdermal Q24h    And     lidocaine   Transdermal Q8H CAMMY     melatonin  10 mg Oral At Bedtime     multivitamin, therapeutic  1 tablet Oral Daily     nystatin  1,000,000 Units Swish & Swallow 4x Daily     pantoprazole (PROTONIX) IV  40 mg Intravenous BID     potassium chloride  20 mEq Oral Once     QUEtiapine  150 mg Oral or Feeding Tube At Bedtime     QUEtiapine  25 mg Oral BID     saline nasal   Each Nare At Bedtime     sertraline  100 mg Oral Daily     sodium chloride  2 spray Both Nostrils Q4H     warfarin ANTICOAGULANT  7.5 mg Oral ONCE at 18:00     Warfarin Therapy Reminder  1 each Oral See Admin Instructions     [Held by provider] zolpidem  5 mg Oral At Bedtime       dextrose Stopped (08/09/22 1430)     heparin 1,300 Units/hr (08/14/22 1321)       Labs:   CMP  Recent Labs   Lab 08/14/22  0642 08/13/22  2048 08/13/22  1534 08/13/22  1132 08/13/22  0450 08/12/22  0602 08/11/22  0815 08/11/22  0709     --   --   --  139 135*  --  135*   POTASSIUM 3.7 3.9 3.3* 4.2 2.9* 4.0  --  3.8   CHLORIDE 103  --   --   --  103 107  --  107   CO2 23  --   --   --  24 18*  --  18*   ANIONGAP 12  --   --   --  12 10  --  10   GLC 89  --   --   --  103* 103* 105* 99   BUN 12.3  --   --   --  12.2 11.7  --  12.5   CR 0.70  --   --   --  0.69 0.60*  --  0.65*   GFRESTIMATED >90  --   --   --  >90 >90  --  >90   ELDA 9.2  --   --   --   8.8 8.8  --  9.0   MAG 1.8  --   --   --  1.6* 2.0  --  2.1   PHOS 3.8  --   --   --  4.0 3.5  --  3.6   PROTTOTAL 6.7  --   --   --  6.4 6.3*  --  6.4   ALBUMIN 3.1*  --   --   --  3.0* 3.0*  --  3.0*   BILITOTAL 0.6  --   --   --  0.6 0.6  --  0.6   ALKPHOS 141*  --   --   --  134* 127  --  134*   AST 22  --   --   --  21 26  --  23   ALT 8*  --   --   --  8* 12  --  12     CBC  Recent Labs   Lab 08/14/22  0642 08/13/22  1534 08/13/22  0450 08/12/22  1738 08/12/22  0602 08/11/22  1631 08/11/22  0709   WBC 9.6  --  9.2  --  9.4  --  9.5   RBC 3.62*  --  3.36*  --  3.34*  --  3.37*   HGB 10.4*  10.4* 10.3* 10.0* 10.8* 9.8*  9.8*   < > 9.9*   HCT 34.2*  --  31.1*  --  31.6*  --  31.5*   MCV 95  --  93  --  95  --  94   MCH 28.7  --  29.8  --  29.3  --  29.4   MCHC 30.4*  --  32.2  --  31.0*  --  31.4*   RDW 17.5*  --  17.6*  --  17.7*  --  17.5*     --  274  --  280  --  285    < > = values in this interval not displayed.     Arterial Blood Gas  Recent Labs   Lab 08/08/22  1607 08/08/22  1333 08/08/22  1014 08/08/22  0359   PH 7.42 7.46* 7.47* 7.44   PCO2 41 37 37 40   PO2 88 87 118* 201*   HCO3 26 26 27 28   O2PER 2 40 40 50       I have reviewed today's vital signs, notes, medications, labs and imaging.  I have also seen and examined the patient and agree with the findings and plan as outlined above.  Pt with HM3 placed with course complicated by nose bleeds requiring vent support.  Pt with good diuresis with stable renal fn.  Will start low intensity heparin and follow for bleeding.  Plan discussed with pt and team.     Darvin Sahni MD, PhD  Professor, Heart Failure and Cardiac Transplantation  [facility]

## 2022-08-14 NOTE — PLAN OF CARE
AO X 4 with intermittent confusion, VSS, sinus to sinus tach, 95% on RA, lung sounds clear and diminished, wt down from yesterday, no edema noted, VAD alarms checked and in place, VAD numbers WDL, VAD dressing changed, heparin therapeutic running at 1300 unit(s) / hour with AM recheck,bridging to warfarin, intermittent dizziness and fatigue when standing with walker, k and mag replaced orally. Encouraging food and ambulation. Denies pain and nausea.    Incontinent of bowel X 1 (large, orange), emesis after breakfast and oral potassium, Cards 2 informed GI panel and Cdiff rule out in place. C Diff negative result.    C2 saying OK for patient to eat food from outside the hospital once if he takes his oral potassium.

## 2022-08-14 NOTE — PLAN OF CARE
Temp: 98  F (36.7  C) Temp src: Oral BP: 91/79 Pulse: 108   Resp: 16 SpO2: 96 % O2 Device: None (Room air)       D: Admitted with cardiogenic shock c/b multiorgan failure (JOSE, shock liver) requiring IABP support. Now s/p HM3 LVAD 7/8. Hx lupus, antiphospholipid syndrome, HTN, HLD, HFrEF 2/2 ICM      I/A: Dandy is A&O x4. Episode of confusion overnight, now resolved. Tele in place, SR. VSS on RA. VAD numbers WDL. PIV in place infusing heparin at 1300u/hr (therapeutic). Partially incontinent x1. Up with assist x1, gets dizzy upon sitting/standing up. Slept between cares overnight     P: Continue to monitor and follow POC. Plan for TCU discharge when medically ready. Notify Cards 2 with changes      Goal Outcome Evaluation:    Plan of Care Reviewed With: patient   Overall Patient Progress: improving

## 2022-08-14 NOTE — PHARMACY-ANTICOAGULATION SERVICE
Clinical Pharmacy - Warfarin Dosing Consult     Pharmacy has been consulted to manage this patient s warfarin therapy.  Indication: LVAD/RVAD  Therapy Goal: INR 2-3  Warfarin PTA Regimen: 7.5mg MThS, 5mg ROW  Significant drug interactions: Aspirin, Plavix, heparin    INR   Date Value Ref Range Status   08/14/2022 1.57 (H) 0.85 - 1.15 Final   08/13/2022 1.86 (H) 0.85 - 1.15 Final       Recommend warfarin 7.5 mg today.  Pharmacy will monitor Dandy Sands daily and order warfarin doses to achieve specified goal.      Please contact pharmacy as soon as possible if the warfarin needs to be held for a procedure or if the warfarin goals change.

## 2022-08-15 ENCOUNTER — APPOINTMENT (OUTPATIENT)
Dept: PHYSICAL THERAPY | Facility: CLINIC | Age: 62
DRG: 001 | End: 2022-08-15
Attending: INTERNAL MEDICINE
Payer: COMMERCIAL

## 2022-08-15 ENCOUNTER — APPOINTMENT (OUTPATIENT)
Dept: OCCUPATIONAL THERAPY | Facility: CLINIC | Age: 62
DRG: 001 | End: 2022-08-15
Attending: INTERNAL MEDICINE
Payer: COMMERCIAL

## 2022-08-15 LAB
ALBUMIN SERPL BCG-MCNC: 3.3 G/DL (ref 3.5–5.2)
ALP SERPL-CCNC: 153 U/L (ref 40–129)
ALT SERPL W P-5'-P-CCNC: 10 U/L (ref 10–50)
ANION GAP SERPL CALCULATED.3IONS-SCNC: 13 MMOL/L (ref 7–15)
AST SERPL W P-5'-P-CCNC: 28 U/L (ref 10–50)
ATRIAL RATE - MUSE: 104 BPM
ATRIAL RATE - MUSE: 104 BPM
ATRIAL RATE - MUSE: 107 BPM
ATRIAL RATE - MUSE: 113 BPM
BILIRUB SERPL-MCNC: 0.6 MG/DL
BUN SERPL-MCNC: 12.8 MG/DL (ref 8–23)
CA-I BLD-MCNC: 4.3 MG/DL (ref 4.4–5.2)
CALCIUM SERPL-MCNC: 9 MG/DL (ref 8.8–10.2)
CHLORIDE SERPL-SCNC: 100 MMOL/L (ref 98–107)
CREAT SERPL-MCNC: 0.74 MG/DL (ref 0.67–1.17)
DEPRECATED HCO3 PLAS-SCNC: 25 MMOL/L (ref 22–29)
DIASTOLIC BLOOD PRESSURE - MUSE: NORMAL MMHG
ERYTHROCYTE [DISTWIDTH] IN BLOOD BY AUTOMATED COUNT: 17.4 % (ref 10–15)
ERYTHROCYTE [DISTWIDTH] IN BLOOD BY AUTOMATED COUNT: 17.6 % (ref 10–15)
GFR SERPL CREATININE-BSD FRML MDRD: >90 ML/MIN/1.73M2
GLUCOSE SERPL-MCNC: 99 MG/DL (ref 70–99)
HCT VFR BLD AUTO: 34.7 % (ref 40–53)
HCT VFR BLD AUTO: 38.4 % (ref 40–53)
HGB BLD-MCNC: 11 G/DL (ref 13.3–17.7)
HGB BLD-MCNC: 11 G/DL (ref 13.3–17.7)
HGB BLD-MCNC: 11.7 G/DL (ref 13.3–17.7)
INR PPP: 1.39 (ref 0.85–1.15)
INTERPRETATION ECG - MUSE: NORMAL
LACTATE SERPL-SCNC: 1.6 MMOL/L (ref 0.7–2)
MAGNESIUM SERPL-MCNC: 1.5 MG/DL (ref 1.7–2.3)
MAGNESIUM SERPL-MCNC: 2.9 MG/DL (ref 1.7–2.3)
MAGNESIUM SERPL-MCNC: 3.6 MG/DL (ref 1.7–2.3)
MCH RBC QN AUTO: 29.4 PG (ref 26.5–33)
MCH RBC QN AUTO: 29.4 PG (ref 26.5–33)
MCHC RBC AUTO-ENTMCNC: 30.5 G/DL (ref 31.5–36.5)
MCHC RBC AUTO-ENTMCNC: 31.7 G/DL (ref 31.5–36.5)
MCV RBC AUTO: 93 FL (ref 78–100)
MCV RBC AUTO: 97 FL (ref 78–100)
P AXIS - MUSE: -17 DEGREES
P AXIS - MUSE: 10 DEGREES
P AXIS - MUSE: NORMAL DEGREES
P AXIS - MUSE: NORMAL DEGREES
PLATELET # BLD AUTO: 272 10E3/UL (ref 150–450)
PLATELET # BLD AUTO: 51 10E3/UL (ref 150–450)
POTASSIUM SERPL-SCNC: 3.6 MMOL/L (ref 3.4–5.3)
PR INTERVAL - MUSE: 112 MS
PR INTERVAL - MUSE: 456 MS
PR INTERVAL - MUSE: NORMAL MS
PR INTERVAL - MUSE: NORMAL MS
PROT SERPL-MCNC: 7.1 G/DL (ref 6.4–8.3)
QRS DURATION - MUSE: 160 MS
QRS DURATION - MUSE: 180 MS
QRS DURATION - MUSE: 212 MS
QRS DURATION - MUSE: 4 MS
QT - MUSE: 362 MS
QT - MUSE: 380 MS
QT - MUSE: 412 MS
QT - MUSE: 472 MS
QTC - MUSE: 476 MS
QTC - MUSE: 544 MS
QTC - MUSE: 559 MS
QTC - MUSE: 635 MS
R AXIS - MUSE: 0 DEGREES
R AXIS - MUSE: 20 DEGREES
R AXIS - MUSE: 209 DEGREES
R AXIS - MUSE: 50 DEGREES
RBC # BLD AUTO: 3.74 10E6/UL (ref 4.4–5.9)
RBC # BLD AUTO: 3.98 10E6/UL (ref 4.4–5.9)
SODIUM SERPL-SCNC: 138 MMOL/L (ref 136–145)
SYSTOLIC BLOOD PRESSURE - MUSE: NORMAL MMHG
T AXIS - MUSE: 107 DEGREES
T AXIS - MUSE: 114 DEGREES
T AXIS - MUSE: 115 DEGREES
T AXIS - MUSE: 129 DEGREES
UFH PPP CHRO-ACNC: 0.64 IU/ML
UFH PPP CHRO-ACNC: <0.1 IU/ML
UFH PPP CHRO-ACNC: <0.1 IU/ML
VENTRICULAR RATE- MUSE: 104 BPM
VENTRICULAR RATE- MUSE: 105 BPM
VENTRICULAR RATE- MUSE: 109 BPM
VENTRICULAR RATE- MUSE: 130 BPM
WBC # BLD AUTO: 8.4 10E3/UL (ref 4–11)
WBC # BLD AUTO: 9.8 10E3/UL (ref 4–11)

## 2022-08-15 PROCEDURE — 83735 ASSAY OF MAGNESIUM: CPT | Performed by: INTERNAL MEDICINE

## 2022-08-15 PROCEDURE — 82330 ASSAY OF CALCIUM: CPT | Performed by: INTERNAL MEDICINE

## 2022-08-15 PROCEDURE — 250N000013 HC RX MED GY IP 250 OP 250 PS 637: Performed by: STUDENT IN AN ORGANIZED HEALTH CARE EDUCATION/TRAINING PROGRAM

## 2022-08-15 PROCEDURE — 85520 HEPARIN ASSAY: CPT | Performed by: INTERNAL MEDICINE

## 2022-08-15 PROCEDURE — 36415 COLL VENOUS BLD VENIPUNCTURE: CPT | Performed by: INTERNAL MEDICINE

## 2022-08-15 PROCEDURE — 214N000001 HC R&B CCU UMMC

## 2022-08-15 PROCEDURE — 36592 COLLECT BLOOD FROM PICC: CPT | Performed by: INTERNAL MEDICINE

## 2022-08-15 PROCEDURE — C9113 INJ PANTOPRAZOLE SODIUM, VIA: HCPCS | Performed by: STUDENT IN AN ORGANIZED HEALTH CARE EDUCATION/TRAINING PROGRAM

## 2022-08-15 PROCEDURE — 85018 HEMOGLOBIN: CPT | Performed by: STUDENT IN AN ORGANIZED HEALTH CARE EDUCATION/TRAINING PROGRAM

## 2022-08-15 PROCEDURE — 85027 COMPLETE CBC AUTOMATED: CPT | Performed by: STUDENT IN AN ORGANIZED HEALTH CARE EDUCATION/TRAINING PROGRAM

## 2022-08-15 PROCEDURE — 83735 ASSAY OF MAGNESIUM: CPT | Performed by: STUDENT IN AN ORGANIZED HEALTH CARE EDUCATION/TRAINING PROGRAM

## 2022-08-15 PROCEDURE — 250N000013 HC RX MED GY IP 250 OP 250 PS 637: Performed by: PHYSICIAN ASSISTANT

## 2022-08-15 PROCEDURE — 83605 ASSAY OF LACTIC ACID: CPT | Performed by: INTERNAL MEDICINE

## 2022-08-15 PROCEDURE — 272N000451 HC KIT SHRLOCK 5FR POWER PICC DOUBLE LUMEN

## 2022-08-15 PROCEDURE — 36568 INSJ PICC <5 YR W/O IMAGING: CPT

## 2022-08-15 PROCEDURE — 99233 SBSQ HOSP IP/OBS HIGH 50: CPT | Performed by: INTERNAL MEDICINE

## 2022-08-15 PROCEDURE — 250N000011 HC RX IP 250 OP 636: Performed by: STUDENT IN AN ORGANIZED HEALTH CARE EDUCATION/TRAINING PROGRAM

## 2022-08-15 PROCEDURE — 85048 AUTOMATED LEUKOCYTE COUNT: CPT | Performed by: INTERNAL MEDICINE

## 2022-08-15 PROCEDURE — 250N000013 HC RX MED GY IP 250 OP 250 PS 637

## 2022-08-15 PROCEDURE — 93010 ELECTROCARDIOGRAM REPORT: CPT | Performed by: INTERNAL MEDICINE

## 2022-08-15 PROCEDURE — 80053 COMPREHEN METABOLIC PANEL: CPT | Performed by: INTERNAL MEDICINE

## 2022-08-15 PROCEDURE — 36415 COLL VENOUS BLD VENIPUNCTURE: CPT | Performed by: STUDENT IN AN ORGANIZED HEALTH CARE EDUCATION/TRAINING PROGRAM

## 2022-08-15 PROCEDURE — 85610 PROTHROMBIN TIME: CPT | Performed by: INTERNAL MEDICINE

## 2022-08-15 PROCEDURE — 97535 SELF CARE MNGMENT TRAINING: CPT | Mod: GO

## 2022-08-15 PROCEDURE — 36592 COLLECT BLOOD FROM PICC: CPT | Performed by: STUDENT IN AN ORGANIZED HEALTH CARE EDUCATION/TRAINING PROGRAM

## 2022-08-15 PROCEDURE — 97530 THERAPEUTIC ACTIVITIES: CPT | Mod: GP | Performed by: PHYSICAL THERAPIST

## 2022-08-15 PROCEDURE — 97530 THERAPEUTIC ACTIVITIES: CPT | Mod: GO

## 2022-08-15 PROCEDURE — 97116 GAIT TRAINING THERAPY: CPT | Mod: GP | Performed by: PHYSICAL THERAPIST

## 2022-08-15 PROCEDURE — 250N000013 HC RX MED GY IP 250 OP 250 PS 637: Performed by: INTERNAL MEDICINE

## 2022-08-15 PROCEDURE — 250N000013 HC RX MED GY IP 250 OP 250 PS 637: Performed by: SURGERY

## 2022-08-15 PROCEDURE — 272N000473 HC KIT, VPS RHYTHM STYLET

## 2022-08-15 PROCEDURE — 93005 ELECTROCARDIOGRAM TRACING: CPT

## 2022-08-15 PROCEDURE — 250N000011 HC RX IP 250 OP 636: Performed by: INTERNAL MEDICINE

## 2022-08-15 PROCEDURE — 85520 HEPARIN ASSAY: CPT | Performed by: STUDENT IN AN ORGANIZED HEALTH CARE EDUCATION/TRAINING PROGRAM

## 2022-08-15 RX ORDER — MAGNESIUM SULFATE HEPTAHYDRATE 40 MG/ML
4 INJECTION, SOLUTION INTRAVENOUS ONCE
Status: COMPLETED | OUTPATIENT
Start: 2022-08-15 | End: 2022-08-15

## 2022-08-15 RX ORDER — MAGNESIUM SULFATE HEPTAHYDRATE 40 MG/ML
4 INJECTION, SOLUTION INTRAVENOUS ONCE
Status: DISCONTINUED | OUTPATIENT
Start: 2022-08-15 | End: 2022-08-15

## 2022-08-15 RX ORDER — WARFARIN SODIUM 7.5 MG/1
7.5 TABLET ORAL
Status: COMPLETED | OUTPATIENT
Start: 2022-08-15 | End: 2022-08-15

## 2022-08-15 RX ORDER — POTASSIUM CHLORIDE 1.5 G/1.58G
20 POWDER, FOR SOLUTION ORAL ONCE
Status: COMPLETED | OUTPATIENT
Start: 2022-08-15 | End: 2022-08-15

## 2022-08-15 RX ORDER — HEPARIN SODIUM,PORCINE 10 UNIT/ML
5-20 VIAL (ML) INTRAVENOUS
Status: DISCONTINUED | OUTPATIENT
Start: 2022-08-15 | End: 2022-08-21 | Stop reason: HOSPADM

## 2022-08-15 RX ORDER — HEPARIN SODIUM,PORCINE 10 UNIT/ML
5-20 VIAL (ML) INTRAVENOUS EVERY 24 HOURS
Status: DISCONTINUED | OUTPATIENT
Start: 2022-08-15 | End: 2022-08-21 | Stop reason: HOSPADM

## 2022-08-15 RX ORDER — MULTIPLE VITAMINS W/ MINERALS TAB 9MG-400MCG
1 TAB ORAL DAILY
Status: DISCONTINUED | OUTPATIENT
Start: 2022-08-16 | End: 2022-08-21 | Stop reason: HOSPADM

## 2022-08-15 RX ORDER — LIDOCAINE 40 MG/G
CREAM TOPICAL
Status: ACTIVE | OUTPATIENT
Start: 2022-08-15 | End: 2022-08-18

## 2022-08-15 RX ADMIN — SALINE NASAL SPRAY 2 SPRAY: 1.5 SOLUTION NASAL at 15:56

## 2022-08-15 RX ADMIN — BUMETANIDE 3 MG: 1 TABLET ORAL at 15:54

## 2022-08-15 RX ADMIN — SERTRALINE HYDROCHLORIDE 100 MG: 100 TABLET ORAL at 07:49

## 2022-08-15 RX ADMIN — SALINE NASAL SPRAY 2 SPRAY: 1.5 SOLUTION NASAL at 11:33

## 2022-08-15 RX ADMIN — DIGOXIN 125 MCG: 125 TABLET ORAL at 07:50

## 2022-08-15 RX ADMIN — PANTOPRAZOLE SODIUM 40 MG: 40 INJECTION, POWDER, FOR SOLUTION INTRAVENOUS at 20:10

## 2022-08-15 RX ADMIN — THERA TABS 1 TABLET: TAB at 07:49

## 2022-08-15 RX ADMIN — NYSTATIN 1000000 UNITS: 100000 SUSPENSION ORAL at 07:49

## 2022-08-15 RX ADMIN — HYDROXYCHLOROQUINE SULFATE 200 MG: 200 TABLET, FILM COATED ORAL at 07:49

## 2022-08-15 RX ADMIN — NYSTATIN 1000000 UNITS: 100000 SUSPENSION ORAL at 20:11

## 2022-08-15 RX ADMIN — ACETAMINOPHEN 650 MG: 325 TABLET, FILM COATED ORAL at 09:30

## 2022-08-15 RX ADMIN — SALINE NASAL SPRAY 2 SPRAY: 1.5 SOLUTION NASAL at 00:04

## 2022-08-15 RX ADMIN — WARFARIN SODIUM 7.5 MG: 7.5 TABLET ORAL at 17:43

## 2022-08-15 RX ADMIN — SALINE NASAL SPRAY 2 SPRAY: 1.5 SOLUTION NASAL at 07:51

## 2022-08-15 RX ADMIN — CAPTOPRIL 12.5 MG: 12.5 TABLET ORAL at 20:10

## 2022-08-15 RX ADMIN — NYSTATIN 1000000 UNITS: 100000 SUSPENSION ORAL at 11:32

## 2022-08-15 RX ADMIN — SALINE NASAL SPRAY 2 SPRAY: 1.5 SOLUTION NASAL at 20:12

## 2022-08-15 RX ADMIN — HEPARIN SODIUM 1600 UNITS/HR: 10000 INJECTION, SOLUTION INTRAVENOUS at 11:30

## 2022-08-15 RX ADMIN — QUETIAPINE FUMARATE 150 MG: 50 TABLET ORAL at 21:40

## 2022-08-15 RX ADMIN — PANTOPRAZOLE SODIUM 40 MG: 40 INJECTION, POWDER, FOR SOLUTION INTRAVENOUS at 07:51

## 2022-08-15 RX ADMIN — MAGNESIUM SULFATE HEPTAHYDRATE 4 G: 40 INJECTION, SOLUTION INTRAVENOUS at 15:55

## 2022-08-15 RX ADMIN — MAGNESIUM SULFATE HEPTAHYDRATE 4 G: 40 INJECTION, SOLUTION INTRAVENOUS at 11:32

## 2022-08-15 RX ADMIN — Medication 12.5 MG: at 11:33

## 2022-08-15 RX ADMIN — FLUTICASONE FUROATE AND VILANTEROL TRIFENATATE 1 PUFF: 100; 25 POWDER RESPIRATORY (INHALATION) at 07:50

## 2022-08-15 RX ADMIN — Medication 37.5 MG: at 13:22

## 2022-08-15 RX ADMIN — CAPTOPRIL 12.5 MG: 12.5 TABLET ORAL at 13:23

## 2022-08-15 RX ADMIN — POTASSIUM CHLORIDE 20 MEQ: 1.5 POWDER, FOR SOLUTION ORAL at 07:49

## 2022-08-15 RX ADMIN — Medication 37.5 MG: at 21:40

## 2022-08-15 RX ADMIN — ACETAMINOPHEN 975 MG: 325 TABLET, FILM COATED ORAL at 20:10

## 2022-08-15 RX ADMIN — CAPTOPRIL 12.5 MG: 12.5 TABLET ORAL at 07:49

## 2022-08-15 RX ADMIN — QUETIAPINE FUMARATE 50 MG: 50 TABLET ORAL at 01:21

## 2022-08-15 RX ADMIN — HYDRALAZINE HYDROCHLORIDE 25 MG: 25 TABLET, FILM COATED ORAL at 04:08

## 2022-08-15 RX ADMIN — BUMETANIDE 3 MG: 1 TABLET ORAL at 07:49

## 2022-08-15 RX ADMIN — QUETIAPINE FUMARATE 25 MG: 25 TABLET ORAL at 15:54

## 2022-08-15 RX ADMIN — ATORVASTATIN CALCIUM 40 MG: 40 TABLET, FILM COATED ORAL at 20:10

## 2022-08-15 RX ADMIN — HYDROXYCHLOROQUINE SULFATE 200 MG: 200 TABLET, FILM COATED ORAL at 20:10

## 2022-08-15 RX ADMIN — NYSTATIN 1000000 UNITS: 100000 SUSPENSION ORAL at 15:54

## 2022-08-15 RX ADMIN — Medication 10 MG: at 21:40

## 2022-08-15 RX ADMIN — QUETIAPINE FUMARATE 25 MG: 25 TABLET ORAL at 07:51

## 2022-08-15 RX ADMIN — SALINE NASAL SPRAY 2 SPRAY: 1.5 SOLUTION NASAL at 04:09

## 2022-08-15 ASSESSMENT — ACTIVITIES OF DAILY LIVING (ADL)
ADLS_ACUITY_SCORE: 38

## 2022-08-15 NOTE — PLAN OF CARE
Temp: 97.6  F (36.4  C) Temp src: Oral BP: 98/78 Pulse: 104   Resp: 16 SpO2: 96 % O2 Device: None (Room air)       D: Admitted with cardiogenic shock c/b multiorgan failure (JOSE, shock liver) requiring IABP support. Now s/p HM3 LVAD 7/8. Hx lupus, antiphospholipid syndrome, HTN, HLD, HFrEF 2/2 ICM      I/A: Dandy is A&O x4. Tele in place, SR. VSS on RA. VAD numbers WDL. PIV in place infusing heparin at 1600u/hr. Incontinent x1. Up with assist x1, gets dizzy upon sitting/standing up. Slept between cares overnight     P: Continue to monitor and follow POC. Plan for TCU discharge when medically ready. Notify Cards 2 with changes      Goal Outcome Evaluation:    Plan of Care Reviewed With: patient   Overall Patient Progress: improving

## 2022-08-15 NOTE — PLAN OF CARE
AO X 4, VSS, sinus to sinus tach, RA, no edema noted, VAD numbers WDL, heparin and IV mag through new PICC, heparin and mag lab recheck at 1730. Vad education begins 5834-0880, 8/16. Dyspnea on exertion, fatigue, dizziness reported when walking with RN and OT X 2. Son in room visiting. Continuing to monitor.

## 2022-08-15 NOTE — PROCEDURES
[facility]    Double Lumen PICC Placement    Date/Time: 8/15/2022 12:21 PM  Performed by: Kym Del Real RN  Authorized by: Nelsy Dewitt MD   Indications: vascular access      UNIVERSAL PROTOCOL   Site Marked: Yes  Prior Images Obtained and Reviewed:  Yes  Required items: Required blood products, implants, devices and special equipment available    Patient identity confirmed:  Verbally with patient and arm band  NA - No sedation, light sedation, or local anesthesia  Confirmation Checklist:  Patient's identity using two indicators, relevant allergies, procedure was appropriate and matched the consent or emergent situation and correct equipment/implants were available  Time out: Immediately prior to the procedure a time out was called    Universal Protocol: the Joint Commission Universal Protocol was followed    Preparation: Patient was prepped and draped in usual sterile fashion       ANESTHESIA    Anesthesia: Local infiltration  Local Anesthetic:  Lidocaine 1% without epinephrine  Anesthetic Total (mL):  3.5      SEDATION    Patient Sedated: No        Preparation: skin prepped with ChloraPrep  Skin prep agent: skin prep agent completely dried prior to procedure  Sterile barriers: maximum sterile barriers were used: cap, mask, sterile gown, sterile gloves, and large sterile sheet  Hand hygiene: hand hygiene performed prior to central venous catheter insertion  Type of line used: PICC and Power PICC  Catheter type: double lumen  Lumen type: non-valved  Catheter size: 5 Fr  Brand: Bard  Lot number: [id number]  Placement method: venipuncture, MST, ultrasound and tip navigation system  Number of attempts: 1  Difficulty threading catheter: no  Successful placement: yes  Orientation: right    Location: brachial vein (lateral) (0.67 cm vein diameter)  Tip Location: superior cavoatrial junction  Site rationale: left pacemaker  Arm circumference: adults 10  cm  Extremity circumference: 25  Visible catheter length: 3  Total catheter length: 41  Dressing and securement: adhesive securement device, glue, alcohol impregnated caps, blood cleaned with CHG, chlorhexidine disc applied, sterile dressing applied, statlock, site cleansed and transparent dressing  Post procedure assessment: blood return through all ports, free fluid flow and placement verified by x-ray  PROCEDURE   Patient Tolerance:  Patient tolerated the procedure well with no immediate complications

## 2022-08-15 NOTE — PROGRESS NOTES
[facility]   Cards 2 - Progress Note    Dandy Sands MRN: [id number]  Age: 61 year old, : 1960  Date: 2022      Assessment and Plan:  Dandy Sands is a 61 year old male with PMH of lupus, antiphospholipid syndrome, HTN, HLD, HFrEF 2/2 ICM who presented with cardiogenic shock c/b multiorgan failure (JOSE, shock liver) requiring mechanical support with IABP, now s/p HM3 LVAD 22 by Dr. Zamora with intraoperative course c/b RV failure s/p 29F Protek duo via RIJ. Post op course c/b hemopericardium s/p repeat washout with chest left open. Chest closed 22 and decannulated from RVAD . Developed epistaxis on  night which was packed by ENT. Unfortunately bleeding persisted and there was concern for airway protection on  AM which prompted intubation and transfer to ICU. Patient has since been transferred to the floor and removed his own nasal packing in the evening .     Changes today:  - Continue low-intensity heparin drip + continue bridging to Warfarin  - PO Bumex 3mg BID  - BID BMPs with K/Mg/Phos repletion  - Increase hydralazine to 37.5mg TID for afterload reduction    Neurology  #Insomnia/Delirium  Some concern that he had a fall overnight on . Discussed with nursing staff and he did NOT have a fall, although he did try to get out of bed. CT head was obtained () and it was unremarkable.  -Per psych recs:   - discontinue olanzapine   - Sertraline and Quetiapine per psych recs    Cardiovascular  #HFrEF 2/2 ICM s/p HM3 LVAD in setting of cardiogenic shock (SCAI D)  #RV failure s/p Protek duo temporary RVAD s/p decannulation   #RV thrombus  #Post chest closure hemopericardium s/p repeat washout ()  #PMH CAD s/p PCI to LAD ()  #S/p CRT-D ()  Patient with ICM s/p HM3 LVAD 22 by Dr. Zamora in setting of presentation for cardiogenic shock requiring MCS with IABP as prior to HM (initially evaluated for heart transplant, however noted  to have substantially elevated DSAs during course of care, so decision made to proceed with LVAD given patient already on temporary MCS). Intraoperative course c/b RV failure resulting in cardiogenic shock requiring high dose pressors necessitating RVAD support. Initial post-op course c/b hemopericardium requiring repeat washout with chest left open, however has since recovered well with decannulation on 7/21 with extubation day prior. Has continued to tolerate well from hemodynamic and end organ standpoint.   -LVAD: continues to have good flows, no alarms and stable end organ perfusion; continue speed at 5300 rpm  -continue digoxin for RV support  -AC, antiplatelets: Continue low-intensity heparin drip + start bridging to Warfarin; hold ASA while monitoring for epistaxis  -Volume status: PO Bumex 3mg BID  -Of note, patient with climbing capture threshold of RV lead; will need to be evaluated in future by EP team  -blood pressure: Increase Hydralazine to 37.5mg TID for afterload reduction; c/t Captopril 6.25mg TID    Pulmonary  #Epistaxis  #Intubated due to Concern for airway protection (8/7)-Extubated (8/8)  #Mild-moderate emphysema  #History of COVID-19  #Iatrogenic R apical PTX  H/o nasal perforation that required elective procedure (in [place]) which was postponed due to LVAD insertion. Developed epistaxis not fixed by packing overnight on 8/6 and pt continued to bleed. Intubated due to concern for ability to maintain airway from bleeding. Extubated 8/8  -Appreciate ENT assistance in managing epistaxis. Patient removed his own nasal packing overnight on 8/9.  -Cefepime/Vanc empirically for broad coverage for PNA (8/7-8/12)  -Continue Breo-Ellipta    ENT:  #Epistaxis  #Dysphonia  Controlled at bedside by ENT s/p cautery and packing. Appreciate assistance. ENT will discuss long-term plan regarding septal perforation with pt post extubation.  -Management per ENT    Gastrointestinal,  Nutrition  #GERD  #Congestive hepatopathy in the setting of cardiac insuffiencey - Resolved  #Hematemesis / oral mucosal bleeding -resolved    #Dysphagia  GI consulted 7/31 for black tarry stools and a possible GI bleed, however it is more likely swallowed blood from epistaxis that patient previously had. GI did not recommend an upper endoscopy. Recurrence of bloody stools likely due to epistaxis which have resolved. Will monitor Hb.  -Protonix 40mg IV BID  -Cleared for regular diet by SLP    Renal, Electrolytes  - Strict I/O, daily weights   - Avoid/limit nephrotoxins as able  - Replete lytes PRN per protocol  - Holding bumex today    Infectious Disease  #E.faecalis on culture from PICC line (8/2/22) - 1/2 tubes in 1/4 sets collected 8/2  #S.epidermidis (MSSE) on culture from PICC line (8/2/22) - 1/2 tubes in 1/4 sets collected 8/2  Vanc was supposed to end on 8/7 but given massive epistaxis will start broad spectrum antibiotics  -Cefepime/Vanc as above (since 8/7)    Hematology  #Anemia due to blood loss from Epistaxis-Resolved  #History of DVT and PE   #Antiphospholipid antibody syndrome  #History of iron deficiency anemia  -Transfuse to keep Hb>7  -BID Hgb today after nasal packing was removed  -Continue low-intensity heparin gtt and hold ASA while monitoring for epistaxis    Endocrinology  SSI    Rheumatology:  #SLE  - PTA meds: hydroxychloroquine 200mg, resumed 7/16  - Will need cellcept restarted when appropriate    Pertinent Lines  PIV    ICU Cares:  Feeds: On PO diet  DVT Prophylaxis: On heparin drip  GI Prophylaxis: PPI    Patient seen and discussed with Dr. Sahni.  Assessment and plan as above.    Spencer Mehta MD  PGY3 IM  Pager: [phone]    Subjective:  Still some volume overload on exam. Feels ok this morning. He is happy with progress on diuresis so far.    Objective Findings:  Temp:  [97.2  F (36.2  C)-97.6  F (36.4  C)] 97.5  F (36.4  C)  Pulse:  [] 95  Resp:  [16-18] 16  BP:  ()/(63-98) 99/85  SpO2:  [92 %-98 %] 98 %    Physical Exam:  GEN: Frail  HEENT: No epistaxis noted.  Pulm: Coarse breath sounds bilaterally  Cardiac: LVAD hum +  GI: soft, non distended  Neuro: Sedated  Vascular: No lower extremity edema    Medications:    acetaminophen  975 mg Oral or Feeding Tube Q8H CAMMY     [Held by provider] aspirin  81 mg Oral or Feeding Tube Daily     atorvastatin  40 mg Oral QPM     bumetanide  3 mg Oral BID     captopril  12.5 mg Oral TID     digoxin  125 mcg Oral Daily     fluticasone-vilanterol  1 puff Inhalation Daily     heparin lock flush  5-20 mL Intracatheter Q24H     hydrALAZINE  37.5 mg Oral Q8H CAMMY     hydroxychloroquine  200 mg Oral BID     ketoconazole   Topical Daily     lidocaine  1 patch Transdermal Q24h    And     lidocaine   Transdermal Q8H CAMMY     magnesium sulfate  4 g Intravenous Once     melatonin  10 mg Oral At Bedtime     [START ON 8/16/2022] multivitamin w/minerals  1 tablet Oral Daily     nystatin  1,000,000 Units Swish & Swallow 4x Daily     pantoprazole (PROTONIX) IV  40 mg Intravenous BID     potassium chloride  20 mEq Oral Once     QUEtiapine  150 mg Oral or Feeding Tube At Bedtime     QUEtiapine  25 mg Oral BID     saline nasal   Each Nare At Bedtime     sertraline  100 mg Oral Daily     sodium chloride  2 spray Both Nostrils Q4H     sodium chloride (PF)  10-40 mL Intracatheter Q8H     sodium chloride (PF)  10-40 mL Intracatheter Q8H     Warfarin Therapy Reminder  1 each Oral See Admin Instructions     [Held by provider] zolpidem  5 mg Oral At Bedtime       dextrose Stopped (08/09/22 1430)     heparin 1,600 Units/hr (08/15/22 1130)       Labs:   CMP  Recent Labs   Lab 08/15/22  0439 08/14/22  1549 08/14/22  0642 08/13/22  2048 08/13/22  1132 08/13/22  0450 08/12/22  0602 08/11/22  0815 08/11/22  0709    138 138  --   --  139 135*  --  135*   POTASSIUM 3.6 3.9 3.7 3.9   < > 2.9* 4.0  --  3.8   CHLORIDE 100 101 103  --   --  103 107  --  107   CO2 25  23 23  --   --  24 18*  --  18*   ANIONGAP 13 14 12  --   --  12 10  --  10   GLC 99 91 89  --   --  103* 103*   < > 99   BUN 12.8 11.5 12.3  --   --  12.2 11.7  --  12.5   CR 0.74 0.67 0.70  --   --  0.69 0.60*  --  0.65*   GFRESTIMATED >90 >90 >90  --   --  >90 >90  --  >90   ELDA 9.0 9.2 9.2  --   --  8.8 8.8  --  9.0   MAG 1.5*  --  1.8  --   --  1.6* 2.0  --  2.1   PHOS  --   --  3.8  --   --  4.0 3.5  --  3.6   PROTTOTAL 7.1  --  6.7  --   --  6.4 6.3*  --  6.4   ALBUMIN 3.3*  --  3.1*  --   --  3.0* 3.0*  --  3.0*   BILITOTAL 0.6  --  0.6  --   --  0.6 0.6  --  0.6   ALKPHOS 153*  --  141*  --   --  134* 127  --  134*   AST 28  --  22  --   --  21 26  --  23   ALT 10  --  8*  --   --  8* 12  --  12    < > = values in this interval not displayed.     CBC  Recent Labs   Lab 08/15/22  0819 08/15/22  0439 08/14/22  1536 08/14/22  0642 08/13/22  1534 08/13/22  0450   WBC 9.8 8.4  --  9.6  --  9.2   RBC 3.74* 3.98*  --  3.62*  --  3.36*   HGB 11.0* 11.7* 11.1* 10.4*  10.4*   < > 10.0*   HCT 34.7* 38.4*  --  34.2*  --  31.1*   MCV 93 97  --  95  --  93   MCH 29.4 29.4  --  28.7  --  29.8   MCHC 31.7 30.5*  --  30.4*  --  32.2   RDW 17.4* 17.6*  --  17.5*  --  17.6*    51*  --  267  --  274    < > = values in this interval not displayed.     Arterial Blood Gas  Recent Labs   Lab 08/08/22  1607 08/08/22  1333   PH 7.42 7.46*   PCO2 41 37   PO2 88 87   HCO3 26 26   O2PER 2 40       I have reviewed today's vital signs, notes, medications, labs and imaging.  I have also seen and examined the patient and agree with the findings and plan as outlined above.  Pt with VSS and HM3 LVAD flows of 3.8 lpm, PI 4.6 and speed 5,300.  No complaints.  2.2 L UO with Cr 0.74, WBC 8.4, Plt 250 and INR 1.39.  Plan is to follow LVAD interrogation, continue diuresis and replace K with hypokalemia and begin coumadin.      Darvin Sahni MD, PhD  Professor, Heart Failure and Cardiac Transplantation  [facility]

## 2022-08-15 NOTE — PLAN OF CARE
Neuro: A&Ox4 this shift.   Cardiac: SR/ST in 100s with BBB. LVAD numbers wnl. MAP 80s-100s.   Respiratory: RA. Denied shortness of breath.   GI/: Voiding via urinal. Incontinent of 1 loose stool.    Diet/appetite: son brought in [employer], pt had 3 tacos. Mild nausea afterwards, controlled with compazine.   Activity: Up with 1 assist.  Pain: Denies.  Skin: No new deficits noted. Sternal incision open to air, old CT sites scabbed over. Coccyx with redness, barrier cream applied. Mepilex intact.    Lines: PIV x 1, heparin drip infusing, recheck in am.   Replacements: K recheck in am.   Plan: continue to monitor po intake, increase activity, VAD education tomorrow.

## 2022-08-15 NOTE — PROGRESS NOTES
"CLINICAL NUTRITION SERVICES - REASSESSMENT NOTE     Nutrition Prescription    RECOMMENDATIONS FOR MDs/PROVIDERS TO ORDER:  1. Liberalize diet to a 3 g sodium diet, if able, to help encourage oral intake.   2. Continue to replace magnesium (1.5 on 8/15).    Malnutrition Status:    Severe malnutrition in the context of acute illness/injury on chronic illness    Recommendations already ordered by Registered Dietitian (RD):  Modified multivitamin   Modified oral supplements  Kcal counts 8/16-8/18    Future/Additional Recommendations:  1. Rec small, frequent meals of high protein/kcal options due to post-op LVAD and increased needs with pressure injury. Encourage two oral supplements daily and outside food.   2. Consider scheduling antiemetics/antinauseants ~20 minutes prior to meals to help optimize nausea control.     3. Consider changing form of magnesium (possibly to Magnesium plus protein) to help alleviate loose stools.   4. Monitor iron status.   5. Continue fluid restriction as per team.      EVALUATION OF THE PROGRESS TOWARD GOALS   Diet: Low Saturated Fat/2400 mg Sodium, 2 L fluid restriction. Ordered to receive Ensure Enlive (chocolate at breakfast and lunch)  Intake: Tolerating diet. Per % intake flowsheets, pt consuming 0-50% with a poor appetite 8/9 (smoothie), 0-25% with a poor appetite 8/10, poor appetite on 8/10 (bites of oatmeal; ate tomato soup and 50% of a chocolate milk for a meal), 0-100% with a poor appetite 8/13, 50% with a poor appetite (three soft tacos but emesis after eating) on 8/14. Per discussion with pt, he dislikes the food in the hospital. Tiring of the options (LOS). Eating 50% of his usual oral intake. His family brings in some outside food. Does not state that nausea impacts oral intake. States the chocolate Ensure Enlive is too chocolaty. Per RN 8/14, \"son brought in taco bro, pt had 3 tacos. Mild nausea afterwards, controlled with compazine.\"    Previous Nutrition Support: Osmolite " "1.5 Virgilio @ 75 ml/hr x 16 hours (1200ml/day) + 1 pkt Prosource TID will provide: 1920 kcals (30 kcal/kg), 108 g PRO (1.7 g/kg), 914 ml free H20, 244 g CHO, and 0 g fiber daily per new dosing wt of 63 kg. Nutrisource Fiber, 1 pkt TID. Last received TF 8/7 before feeding tube out.       NEW FINDINGS   Resp: Extubated 8/8  Stools: Pt having zero to five stools daily. Last stool was yesterday, 8/14. Stools are loose and brown. C diff negative on 8/14.  WOC (8/9): \"Pressure Injury Location: R) temporal area. Pressure Injury Stage: Deep Tissue Pressure Injury (DTPI), hospital acquired.This is a Medical Device Related Pressure Injury (MDRPI) due to RVAD.\"   Wt Hx: 81.4 kg (3/17/14), 66.8 kg (5/17/2022), 64.6 kg (5/26/2022), 62.6 kg (7/8/2022), 67.4 kg (6/17, admit) - Pt has lost 10% of his body wt over the last three months. Diuresis. Suspect actual and fluid loss.     UPDATED ASSESSED NUTRITION NEEDS   NEW Dosing Weight: 60 kg (based on lowest wt this admission of 60 kg on 8/15)  Estimated Energy Needs: 0598-4389 kcals/day (30-35 kcals/kg)   Justification: Increased needs, pressure injury  Estimated Protein Needs:  grams protein/day (1.5 - 2 grams of pro/kg)   Justification: Increased needs post-op LVAD and for pressure injury healing  Estimated Fluid Needs: 1 mL/kcal  Justification: Maintenance or per primary team pending fluid status    MALNUTRITION  % Intake: </= 50% for >/= 5 days (severe)  % Weight Loss: > 7.5% in 3 months (severe) - Suspect actual and fluid loss.   Subcutaneous Fat Loss: Facial region:  Severe, Upper arm:  Moderate and Thoracic/intercostal:  Moderate  Muscle Loss:  Temporal:  Severe, Facial & jaw region:  Moderate, Thoracic region (clavicle, acromium bone, deltoid, trapezius, pectoral):  Moderate, Upper arm (bicep, tricep):  Moderate and Dorsal hand: Moderate  Fluid Accumulation/Edema: Does not meet criteria  Malnutrition Diagnosis: Severe malnutrition in the context of acute illness/injury on " chronic illness    Previous Goals   Total avg nutritional intake to meet a minimum of 25 kcal/kg and 1.5 g PRO/kg daily (per dosing wt 63 kg).  Evaluation: Not meeting.     Previous Nutrition Diagnosis  Inadequate oral intake related to NPO status as evidenced by re-intubation.     Evaluation: Unresolved. Updated below.     CURRENT NUTRITION DIAGNOSIS  Inadequate oral intake related to food preferences and LOS as evidenced by pt consuming 0-25% of meals.       INTERVENTIONS  Implementation  Medical food supplement therapy: Modified oral supplements to send vanilla Ensure Enlive at 14:00 and apple Ensure Clear at HS.   Multivitamin/mineral supplement therapy: Modified multivitamin to a multivitamin with minerals to ensure micronutrient needs are met.  Nutrition education for nutrition relationship to health/disease: Explained the importance of oral intake, increased kcals/protein for healing.   Ordered kcal counts 8/16-8/18.    Goals  Patient to consume % of nutritionally adequate meal trays TID, or the equivalent with supplements/snacks.    Monitoring/Evaluation  Progress toward goals will be monitored and evaluated per protocol.     Nutrition will continue to follow     Bridget Wade, MS, RD, LD, [facility]   6C Pgr: [phone]

## 2022-08-15 NOTE — PLAN OF CARE
Goal Outcome Evaluation:    Plan of Care Reviewed With: patient     Overall Patient Progress: no change    Outcome Evaluation: Fair appetite. Encourage outside food and rec liberalize diet to a 3 g sodium diet. Rec two oral supplements daily due to pressure injury.

## 2022-08-16 ENCOUNTER — APPOINTMENT (OUTPATIENT)
Dept: OCCUPATIONAL THERAPY | Facility: CLINIC | Age: 62
DRG: 001 | End: 2022-08-16
Attending: INTERNAL MEDICINE
Payer: COMMERCIAL

## 2022-08-16 ENCOUNTER — APPOINTMENT (OUTPATIENT)
Dept: PHYSICAL THERAPY | Facility: CLINIC | Age: 62
DRG: 001 | End: 2022-08-16
Attending: INTERNAL MEDICINE
Payer: COMMERCIAL

## 2022-08-16 LAB
ALBUMIN SERPL BCG-MCNC: 3.5 G/DL (ref 3.5–5.2)
ALP SERPL-CCNC: 148 U/L (ref 40–129)
ALT SERPL W P-5'-P-CCNC: 8 U/L (ref 10–50)
ANION GAP SERPL CALCULATED.3IONS-SCNC: 11 MMOL/L (ref 7–15)
AST SERPL W P-5'-P-CCNC: 23 U/L (ref 10–50)
ATRIAL RATE - MUSE: 101 BPM
BILIRUB SERPL-MCNC: 0.6 MG/DL
BUN SERPL-MCNC: 12.4 MG/DL (ref 8–23)
CA-I BLD-MCNC: 4.5 MG/DL (ref 4.4–5.2)
CALCIUM SERPL-MCNC: 9.1 MG/DL (ref 8.8–10.2)
CHLORIDE SERPL-SCNC: 96 MMOL/L (ref 98–107)
CREAT SERPL-MCNC: 0.72 MG/DL (ref 0.67–1.17)
DEPRECATED HCO3 PLAS-SCNC: 29 MMOL/L (ref 22–29)
DIASTOLIC BLOOD PRESSURE - MUSE: NORMAL MMHG
ERYTHROCYTE [DISTWIDTH] IN BLOOD BY AUTOMATED COUNT: 17.1 % (ref 10–15)
GFR SERPL CREATININE-BSD FRML MDRD: >90 ML/MIN/1.73M2
GLUCOSE SERPL-MCNC: 108 MG/DL (ref 70–99)
HCT VFR BLD AUTO: 32.9 % (ref 40–53)
HGB BLD-MCNC: 10.1 G/DL (ref 13.3–17.7)
HGB BLD-MCNC: 10.3 G/DL (ref 13.3–17.7)
INR PPP: 1.95 (ref 0.85–1.15)
INTERPRETATION ECG - MUSE: NORMAL
LACTATE SERPL-SCNC: 0.6 MMOL/L (ref 0.7–2)
LDH SERPL L TO P-CCNC: 311 U/L (ref 0–250)
MAGNESIUM SERPL-MCNC: 2.4 MG/DL (ref 1.7–2.3)
MCH RBC QN AUTO: 28.8 PG (ref 26.5–33)
MCHC RBC AUTO-ENTMCNC: 31.3 G/DL (ref 31.5–36.5)
MCV RBC AUTO: 92 FL (ref 78–100)
P AXIS - MUSE: NORMAL DEGREES
PLATELET # BLD AUTO: 248 10E3/UL (ref 150–450)
POTASSIUM SERPL-SCNC: 3 MMOL/L (ref 3.4–5.3)
POTASSIUM SERPL-SCNC: 3.7 MMOL/L (ref 3.4–5.3)
PR INTERVAL - MUSE: NORMAL MS
PROT SERPL-MCNC: 7.3 G/DL (ref 6.4–8.3)
QRS DURATION - MUSE: 114 MS
QT - MUSE: 464 MS
QTC - MUSE: 598 MS
R AXIS - MUSE: -58 DEGREES
RBC # BLD AUTO: 3.58 10E6/UL (ref 4.4–5.9)
SODIUM SERPL-SCNC: 136 MMOL/L (ref 136–145)
SYSTOLIC BLOOD PRESSURE - MUSE: NORMAL MMHG
T AXIS - MUSE: 106 DEGREES
UFH PPP CHRO-ACNC: 0.48 IU/ML
UFH PPP CHRO-ACNC: 0.72 IU/ML
VENTRICULAR RATE- MUSE: 100 BPM
WBC # BLD AUTO: 8 10E3/UL (ref 4–11)

## 2022-08-16 PROCEDURE — 84132 ASSAY OF SERUM POTASSIUM: CPT | Performed by: INTERNAL MEDICINE

## 2022-08-16 PROCEDURE — 85018 HEMOGLOBIN: CPT | Performed by: STUDENT IN AN ORGANIZED HEALTH CARE EDUCATION/TRAINING PROGRAM

## 2022-08-16 PROCEDURE — 83735 ASSAY OF MAGNESIUM: CPT

## 2022-08-16 PROCEDURE — 85014 HEMATOCRIT: CPT | Performed by: INTERNAL MEDICINE

## 2022-08-16 PROCEDURE — 258N000003 HC RX IP 258 OP 636: Performed by: STUDENT IN AN ORGANIZED HEALTH CARE EDUCATION/TRAINING PROGRAM

## 2022-08-16 PROCEDURE — 250N000013 HC RX MED GY IP 250 OP 250 PS 637: Performed by: STUDENT IN AN ORGANIZED HEALTH CARE EDUCATION/TRAINING PROGRAM

## 2022-08-16 PROCEDURE — 80053 COMPREHEN METABOLIC PANEL: CPT | Performed by: INTERNAL MEDICINE

## 2022-08-16 PROCEDURE — 83605 ASSAY OF LACTIC ACID: CPT | Performed by: INTERNAL MEDICINE

## 2022-08-16 PROCEDURE — 85520 HEPARIN ASSAY: CPT | Performed by: INTERNAL MEDICINE

## 2022-08-16 PROCEDURE — 36592 COLLECT BLOOD FROM PICC: CPT

## 2022-08-16 PROCEDURE — 85610 PROTHROMBIN TIME: CPT | Performed by: INTERNAL MEDICINE

## 2022-08-16 PROCEDURE — 250N000011 HC RX IP 250 OP 636

## 2022-08-16 PROCEDURE — 93005 ELECTROCARDIOGRAM TRACING: CPT

## 2022-08-16 PROCEDURE — 97110 THERAPEUTIC EXERCISES: CPT | Mod: GO

## 2022-08-16 PROCEDURE — 250N000011 HC RX IP 250 OP 636: Performed by: STUDENT IN AN ORGANIZED HEALTH CARE EDUCATION/TRAINING PROGRAM

## 2022-08-16 PROCEDURE — 36592 COLLECT BLOOD FROM PICC: CPT | Performed by: STUDENT IN AN ORGANIZED HEALTH CARE EDUCATION/TRAINING PROGRAM

## 2022-08-16 PROCEDURE — 93010 ELECTROCARDIOGRAM REPORT: CPT | Performed by: INTERNAL MEDICINE

## 2022-08-16 PROCEDURE — 99233 SBSQ HOSP IP/OBS HIGH 50: CPT | Mod: GC | Performed by: INTERNAL MEDICINE

## 2022-08-16 PROCEDURE — 97535 SELF CARE MNGMENT TRAINING: CPT | Mod: GO

## 2022-08-16 PROCEDURE — 250N000013 HC RX MED GY IP 250 OP 250 PS 637: Performed by: PHYSICIAN ASSISTANT

## 2022-08-16 PROCEDURE — 250N000013 HC RX MED GY IP 250 OP 250 PS 637: Performed by: INTERNAL MEDICINE

## 2022-08-16 PROCEDURE — C9113 INJ PANTOPRAZOLE SODIUM, VIA: HCPCS | Performed by: STUDENT IN AN ORGANIZED HEALTH CARE EDUCATION/TRAINING PROGRAM

## 2022-08-16 PROCEDURE — 82330 ASSAY OF CALCIUM: CPT | Performed by: INTERNAL MEDICINE

## 2022-08-16 PROCEDURE — G0463 HOSPITAL OUTPT CLINIC VISIT: HCPCS

## 2022-08-16 PROCEDURE — 97530 THERAPEUTIC ACTIVITIES: CPT | Mod: GP

## 2022-08-16 PROCEDURE — 250N000013 HC RX MED GY IP 250 OP 250 PS 637: Performed by: SURGERY

## 2022-08-16 PROCEDURE — 214N000001 HC R&B CCU UMMC

## 2022-08-16 PROCEDURE — 97110 THERAPEUTIC EXERCISES: CPT | Mod: GP

## 2022-08-16 PROCEDURE — 36592 COLLECT BLOOD FROM PICC: CPT | Performed by: INTERNAL MEDICINE

## 2022-08-16 PROCEDURE — 83615 LACTATE (LD) (LDH) ENZYME: CPT

## 2022-08-16 RX ORDER — POTASSIUM CHLORIDE 20MEQ/15ML
40 LIQUID (ML) ORAL ONCE
Status: COMPLETED | OUTPATIENT
Start: 2022-08-16 | End: 2022-08-16

## 2022-08-16 RX ORDER — WARFARIN SODIUM 5 MG/1
5 TABLET ORAL
Status: COMPLETED | OUTPATIENT
Start: 2022-08-16 | End: 2022-08-16

## 2022-08-16 RX ORDER — POTASSIUM CHLORIDE 750 MG/1
20 TABLET, EXTENDED RELEASE ORAL ONCE
Status: COMPLETED | OUTPATIENT
Start: 2022-08-16 | End: 2022-08-16

## 2022-08-16 RX ORDER — POTASSIUM CHLORIDE 20MEQ/15ML
20 LIQUID (ML) ORAL ONCE
Status: COMPLETED | OUTPATIENT
Start: 2022-08-16 | End: 2022-08-16

## 2022-08-16 RX ORDER — PANTOPRAZOLE SODIUM 40 MG/1
40 TABLET, DELAYED RELEASE ORAL
Status: DISCONTINUED | OUTPATIENT
Start: 2022-08-17 | End: 2022-08-21 | Stop reason: HOSPADM

## 2022-08-16 RX ADMIN — SALINE NASAL SPRAY 2 SPRAY: 1.5 SOLUTION NASAL at 04:13

## 2022-08-16 RX ADMIN — ACETAMINOPHEN 975 MG: 325 TABLET, FILM COATED ORAL at 04:12

## 2022-08-16 RX ADMIN — QUETIAPINE FUMARATE 25 MG: 25 TABLET ORAL at 09:07

## 2022-08-16 RX ADMIN — NYSTATIN 1000000 UNITS: 100000 SUSPENSION ORAL at 09:06

## 2022-08-16 RX ADMIN — SALINE NASAL SPRAY 2 SPRAY: 1.5 SOLUTION NASAL at 20:45

## 2022-08-16 RX ADMIN — POTASSIUM CHLORIDE 40 MEQ: 40 SOLUTION ORAL at 05:31

## 2022-08-16 RX ADMIN — ACETAMINOPHEN 975 MG: 325 TABLET, FILM COATED ORAL at 20:04

## 2022-08-16 RX ADMIN — ACETAMINOPHEN 975 MG: 325 TABLET, FILM COATED ORAL at 12:42

## 2022-08-16 RX ADMIN — SALINE NASAL SPRAY 2 SPRAY: 1.5 SOLUTION NASAL at 20:04

## 2022-08-16 RX ADMIN — Medication 5 ML: at 09:16

## 2022-08-16 RX ADMIN — Medication 10 MG: at 20:45

## 2022-08-16 RX ADMIN — HYDROXYCHLOROQUINE SULFATE 200 MG: 200 TABLET, FILM COATED ORAL at 09:06

## 2022-08-16 RX ADMIN — WARFARIN SODIUM 5 MG: 5 TABLET ORAL at 18:03

## 2022-08-16 RX ADMIN — CAPTOPRIL 12.5 MG: 12.5 TABLET ORAL at 09:07

## 2022-08-16 RX ADMIN — NYSTATIN 1000000 UNITS: 100000 SUSPENSION ORAL at 12:42

## 2022-08-16 RX ADMIN — QUETIAPINE FUMARATE 50 MG: 50 TABLET ORAL at 00:58

## 2022-08-16 RX ADMIN — SALINE NASAL SPRAY 2 SPRAY: 1.5 SOLUTION NASAL at 00:28

## 2022-08-16 RX ADMIN — QUETIAPINE FUMARATE 150 MG: 50 TABLET ORAL at 20:45

## 2022-08-16 RX ADMIN — SERTRALINE HYDROCHLORIDE 100 MG: 100 TABLET ORAL at 09:07

## 2022-08-16 RX ADMIN — SODIUM CHLORIDE, POTASSIUM CHLORIDE, SODIUM LACTATE AND CALCIUM CHLORIDE 500 ML: 600; 310; 30; 20 INJECTION, SOLUTION INTRAVENOUS at 12:41

## 2022-08-16 RX ADMIN — SALINE NASAL SPRAY 2 SPRAY: 1.5 SOLUTION NASAL at 16:00

## 2022-08-16 RX ADMIN — POTASSIUM CHLORIDE 20 MEQ: 20 SOLUTION ORAL at 09:06

## 2022-08-16 RX ADMIN — Medication 1 TABLET: at 09:06

## 2022-08-16 RX ADMIN — CAPTOPRIL 12.5 MG: 12.5 TABLET ORAL at 15:06

## 2022-08-16 RX ADMIN — DIGOXIN 125 MCG: 125 TABLET ORAL at 09:07

## 2022-08-16 RX ADMIN — SALINE NASAL SPRAY 2 SPRAY: 1.5 SOLUTION NASAL at 23:52

## 2022-08-16 RX ADMIN — SALINE NASAL SPRAY 2 SPRAY: 1.5 SOLUTION NASAL at 09:08

## 2022-08-16 RX ADMIN — QUETIAPINE FUMARATE 25 MG: 25 TABLET ORAL at 15:06

## 2022-08-16 RX ADMIN — CAPTOPRIL 12.5 MG: 12.5 TABLET ORAL at 20:04

## 2022-08-16 RX ADMIN — POTASSIUM CHLORIDE 20 MEQ: 750 TABLET, EXTENDED RELEASE ORAL at 15:06

## 2022-08-16 RX ADMIN — ATORVASTATIN CALCIUM 40 MG: 40 TABLET, FILM COATED ORAL at 20:04

## 2022-08-16 RX ADMIN — PANTOPRAZOLE SODIUM 40 MG: 40 INJECTION, POWDER, FOR SOLUTION INTRAVENOUS at 09:01

## 2022-08-16 RX ADMIN — HYDROXYCHLOROQUINE SULFATE 200 MG: 200 TABLET, FILM COATED ORAL at 20:04

## 2022-08-16 ASSESSMENT — ACTIVITIES OF DAILY LIVING (ADL)
ADLS_ACUITY_SCORE: 36
ADLS_ACUITY_SCORE: 38
ADLS_ACUITY_SCORE: 36
ADLS_ACUITY_SCORE: 38
ADLS_ACUITY_SCORE: 36
ADLS_ACUITY_SCORE: 38
ADLS_ACUITY_SCORE: 36
ADLS_ACUITY_SCORE: 38

## 2022-08-16 NOTE — PROGRESS NOTES
D/He had VAD teaching today including dressing change per report. He tells me he needs to go to rehab as he is still dizzy when he gets up. He thinks he may go tomorrow. Rt temple wound appears like a skin scrape today. Lytes replaced by days twice. Continues on Heparin drip. No problems with nosebleeds as before per his report  A/incisions appear to be healing.  P/monitor for changes

## 2022-08-16 NOTE — PLAN OF CARE
D: Admitted 8/16 with cardiogenic shock c/b multiorgan failure (JOSE, shock liver) requiring mechanical support with IABP, now s/p HM3 LVAD 7/8/22.     I: Monitored vitals and assessed pt status.   Changed: bumex and hydralazine held this AM  Running: heparin gtt at 1100 units/hr, next 10a check at 1200  PRN:  seroquel x1     A: A&Ox4. On room air. SR/ST, 90's-100's VSS, afebrile. LVAD with multiple low flow alarms this AM. Driveline dressing CDI. Sternal incision WNL, open to air. Urinating adequately. LBM 8/14. Assist x1, endorses dizziness with activity.      P: Continue to monitor.

## 2022-08-16 NOTE — PROGRESS NOTES
[facility] Nurse Inpatient Assessment     Consulted for: R) temporal wound    Patient History (according to provider note(s):      Dandy Sands is a 61 year old male with a PMH of CAD s/p PCI to LAD, MI, ICM, acute on chronic heart failure, s/p CRT-D, severe MR, antiphospholipid antibody syndrome on chronic warfarin, SLE, HTN, HLD, recent hospitalization 5/16-5/26 s/p RCA, LAD stenting who underwent RVAD (29F Protek duo RIJ) LVAD placement (HeartMate 3) on 7/8/22 by Dr. Zamora.    Areas Assessed:      Areas visualized during today's visit:  head    Pressure Injury Location: R) temporal area        7/13 7/20 7/27 8/2 8/9 8/16        Wound type: Pressure Injury     Pressure Injury Stage: Deep Tissue Pressure Injury (DTPI), hospital acquired      This is a Medical Device Related Pressure Injury (MDRPI) due to  RVAD Cannula was pressing against the area and wrapped with coban.  Currently has optifoam in place and tube is pulled away from the area avoiding direct pressure.  Wound history/plan of care:   Per RN tube was directly laying on the area,    Wound base: 100 % dermis     Palpation of the wound bed: normal      Drainage: none     Description of drainage: none     Measurements (length x width x depth, in cm)superficial scab:1  x 0.4 x 0 cm     Tunneling N/A     Undermining N/A  Periwound skin: Intact      Color: normal and consistent with surrounding tissue      Temperature: normal   Odor: none  Pain: absent and no grimacing or signs of discomfort, none  Pain intervention prior to dressing change: N/A  Treatment goal: Heal  and Protection  STATUS: healing  Supplies ordered: supplies stored on unit    Treatment Plan:     R) temporal area wound(s): Daily    Cleanse the area with NS and pat dry.  Apply thin layer of Vaseline to wound  Leave Open to air.     Orders:  Updated    RECOMMEND PRIMARY TEAM ORDER: None, at this time  Education provided: plan of care  Discussed plan of care with: Patient and Nurse  WO nurse follow-up plan: weekly  Notify WOC if wound(s) deteriorate.  Nursing to notify the Provider(s) and re-consult the WO Nurse if new skin concern.    DATA:     Current support surface: Standard  Low air loss mattress  Containment of urine/stool: Indwelling catheter  BMI: Body mass index is 20.72 kg/m .   Active diet order: Orders Placed This Encounter      Low Saturated Fat Na <2400 mg         Labs: Recent Labs     Recent Labs   Lab Test 08/16/22  0350 08/03/22  1154 08/03/22  0606 05/20/22  1715 05/20/22  0516   ALBUMIN 3.5   < > 2.9*   < > 3.1*   HGB 10.3*   < > 8.8*  8.8*   < > 8.6*   INR 1.95*   < > 2.36*   < > 1.24*   WBC 8.0   < > 9.5   < > 5.5   A1C  --   --   --   --  5.5   CRP  --   --  141.00*   < >  --     < > = values in this interval not displayed.       Pressure injury risk assessment:   Sensory Perception: 4-->no impairment  Moisture: 4-->rarely moist  Activity: 3-->walks occasionally  Mobility: 3-->slightly limited  Nutrition: 3-->adequate  Friction and Shear: 2-->potential problem  Case Score: 19    Genevieve Mattson RN [facility]   Pager: [phone]  Ph: [phone]

## 2022-08-16 NOTE — PLAN OF CARE
Pt admitted 6/17 with cardiogenic shock s/p IABP and HM III 7/8 c/b RV fail s/p RVAD and de cannulated.  Pace/SR, VS'S (ortho hypotension positive) on RA and denied pain.  HM III LVAD numbers WNL and no alarms noted.   Drive line dressing change with LVAD teaching.  Heparin gtt continues at 1100 units/hr and next 10a draw for 2015.  Warfarin teaching cancelled as pt on Warfarin prior to admission.  LR bolus given.  K recheck 3.7 and next dose ordered.  Otherwise, pt resting well and up assist of one.  Continue to monitor and with POC.

## 2022-08-16 NOTE — PLAN OF CARE
Goal Outcome Evaluation:    Plan of Care Reviewed With: patient     Overall Patient Progress: improving     Shift summary [id number]    D:Pt s/p heart mate 3 with multiple complications see MD notes.    A/I: Pt has been in SR, other VSS. Pt heart mate numbers stable , PI variable with movement ( max PI 7.3). Pt's lungs with crackles in left lower base but sating well on RA. Pt receiving oral bumex and having good output. Pt's drive line site CDI, dressing changed. Pt reported having BM this am. Pt eating fairly. Pt's mag replaced with 4 gm this evening recheck pending. Pt continues on heparin gtt at 1400 next 10 a at at 0200. Pt moving well with minimal assist. Pt has newly placed picc line in right arm. Pt denies any c/o pain. Pt alert and oriented, neuro's intact. LVAD teaching to start tomorrow.  P: Continue current POC, LVAD teaching, update MD's with concerns.

## 2022-08-16 NOTE — PROVIDER NOTIFICATION
Notified cards night resident of low flow alarm of 2.1 at 0532 after patient sat up in bed, patient endorsed dizziness and lightheadedness during event. Automatic blood pressure was in process during event and showed a MAP of 38, doppler MAP approximately 5 minutes later showed a MAP of 76. RN instructed to perform orthostatics on patient.    While sitting patient up for orthostatic BP, patient became dizzy and lightheaded, and had another low flow alarm of 1.8 at 0611. Doppler MAP of 48.    Patient had another low flow alarm of 2.5 at 0616 while continuing to sit on the edge of the bed, continued to endorse dizziness.    Cards night resident and VAD coordinator notified of all events. Bumex and hydralazine held by provider.

## 2022-08-16 NOTE — PROGRESS NOTES
[facility]   Cards 2 - Progress Note    Dandy Sands MRN: [id number]  Age: 61 year old, : 1960  Date: 2022    Assessment and Plan:  Dandy Sands is a 61 year old male with PMH of lupus, antiphospholipid syndrome, HTN, HLD, HFrEF 2/2 ICM who presented with cardiogenic shock c/b multiorgan failure (JOSE, shock liver) requiring mechanical support with IABP, now s/p HM3 LVAD 22 by Dr. Zamora with intraoperative course c/b RV failure s/p 29F Protek duo via RIJ. Post op course c/b hemopericardium s/p repeat washout with chest left open. Chest closed 22 and decannulated from RVAD . Developed epistaxis on  night which was packed by ENT. Unfortunately bleeding persisted and there was concern for airway protection on  AM which prompted intubation and transfer to ICU. Patient has since been transferred to the floor and removed his own nasal packing in the evening .     Changes today:  - Continue low-intensity heparin drip + continue bridging to Warfarin (INR 1.95 today)  - Bumex, hydral held overnight due to LVAD low flow. Thought this might due to poor intake and increasing hydral dose 25->37.5 mg tid yesterday  - will monitor MAPs and might slowly restart diuretics or afterload reduction tomorrow  - BID BMPs with K/Mg/Phos repletion  - discontinue nystatin (finished 10 day course)  - transition PPI IV to oral given that there was no obvious GI bleeding    #HFrEF 2/2 ICM s/p HM3 LVAD in setting of cardiogenic shock (SCAI D)  #RV failure s/p Protek duo temporary RVAD s/p decannulation   #RV thrombus  #Post chest closure hemopericardium s/p repeat washout ()  #PMH CAD s/p PCI to LAD ()  #S/p CRT-D ()  Patient with ICM s/p HM3 LVAD 22 by Dr. Zamora in setting of presentation for cardiogenic shock requiring MCS with IABP as prior to HM (initially evaluated for heart transplant, however noted to have substantially elevated DSAs during course  of care, so decision made to proceed with LVAD given patient already on temporary MCS). Intraoperative course c/b RV failure resulting in cardiogenic shock requiring high dose pressors necessitating RVAD support. Initial post-op course c/b hemopericardium requiring repeat washout with chest left open, however has since recovered well with decannulation on 7/21 with extubation day prior. Has continued to tolerate well from hemodynamic and end organ standpoint.   - Volume status: euvolemic  - LVAD: low flow alarms flow 1.8-2.4. Bumex, hydral held overnight due to LVAD low flow 1.8-2.4. Thought this might due to poor intake and increasing hydral dose 25->37.5 mg tid yesterday  - will monitor MAPs and might slowly restart diuretics or afterload reduction tomorrow  - continue captopril 6.25 mg tid  - continue digoxin for RV support  - AC, antiplatelets: Continue low-intensity heparin drip + start bridging to Warfarin (INR 1.95);  - plan to restart aspirin after discontinue heparin  - Of note, patient with climbing capture threshold of RV lead; will need to be evaluated in future by EP team    #Epistaxis  #Intubated due to Concern for airway protection (8/7)-Extubated (8/8)  #Mild-moderate emphysema  #History of COVID-19  #Iatrogenic R apical PTX  H/o nasal perforation that required elective procedure (in [place]) which was postponed due to LVAD insertion. Developed epistaxis not fixed by packing overnight on 8/6 and pt continued to bleed. Intubated due to concern for ability to maintain airway from bleeding. Controlled at bedside by ENT s/p cautery and packing. Appreciate assistance. Extubated 8/8. Cefepime/Vanc empirically for broad coverage for PNA (8/7-8/12). Patient removed his own nasal packing overnight on 8/9. ENT will discuss long-term plan regarding septal perforation with pt post extubation.  -Continue Breo-Ellipta  -Epistaxis management per ENT    #Insomnia/Delirium  Some concern that he had a fall  overnight on 8/6. Discussed with nursing staff and he did NOT have a fall, although he did try to get out of bed. CT head was obtained (8/7) and it was unremarkable.  -Per psych recs:   - discontinue olanzapine   - Sertraline and Quetiapine per psych recs    #GERD  #Congestive hepatopathy in the setting of cardiac insuffiency - Resolved  #Hematemesis / oral mucosal bleeding -resolved    #Dysphagia  GI consulted 7/31 for black tarry stools and a possible GI bleed, however it is more likely swallowed blood from epistaxis that patient previously had. GI did not recommend an upper endoscopy. Recurrence of bloody stools likely due to epistaxis which have resolved. Will monitor Hb.  -transition PPI IV to oral given there was no obvious GI bleeding  -cleared for regular diet by SLP    #E.faecalis on culture from PICC line (8/2/22) - 1/2 tubes in 1/4 sets collected 8/2  #S.epidermidis (MSSE) on culture from PICC line (8/2/22) - 1/2 tubes in 1/4 sets collected 8/2  Vanc was supposed to end on 8/7 but given massive epistaxis will start broad spectrum antibiotics  -Cefepime/Vanc empirically for broad coverage for PNA (8/7-8/12)    #Anemia due to blood loss from Epistaxis-Resolved  #History of DVT and PE   #Antiphospholipid antibody syndrome  #History of iron deficiency anemia  -monitor CBC qday; transfuse to keep Hb>7  -Continue low-intensity heparin gtt and hold ASA while monitoring for epistaxis    #SLE  - PTA meds: hydroxychloroquine 200mg, resumed 7/16  - Will need cellcept restarted when appropriate    #Oral thrush  Completed 14 days of nystatin   - discontinue nystatin today    Pertinent Lines  PIV    Feeds: PO diet  DVT Prophylaxis: heparin gtt, warfarin  GI Prophylaxis: PPI    Patient seen and discussed with Dr. Sahni.  Assessment and plan as above.    Nelsy Dewitt MD  Internal Medicine Resident (PGY-1)  [facility]      Subjective:  Reports dizziness in the morning, however feels better in the  afternoon. Denies further epistaxis.    Objective Findings:  Temp:  [97.5  F (36.4  C)-98.4  F (36.9  C)] 98.2  F (36.8  C)  Pulse:  [] 95  Resp:  [16-18] 16  BP: ()/(64-86) 101/81  SpO2:  [93 %-97 %] 93 %    Physical Exam:  GEN: Frail  HEENT: No epistaxis noted.  Pulm: both lungs were clear  Cardiac: no JVD, LVAD hum +  GI: soft, non distended  Neuro: alert  Vascular: No lower extremity edema    Medications:    acetaminophen  975 mg Oral or Feeding Tube Q8H CAMMY     [Held by provider] aspirin  81 mg Oral or Feeding Tube Daily     atorvastatin  40 mg Oral QPM     [Held by provider] bumetanide  3 mg Oral BID     captopril  12.5 mg Oral TID     digoxin  125 mcg Oral Daily     fluticasone-vilanterol  1 puff Inhalation Daily     heparin lock flush  5-20 mL Intracatheter Q24H     [Held by provider] hydrALAZINE  37.5 mg Oral Q8H CAMMY     hydroxychloroquine  200 mg Oral BID     ketoconazole   Topical Daily     lidocaine  1 patch Transdermal Q24h    And     lidocaine   Transdermal Q8H CAMMY     melatonin  10 mg Oral At Bedtime     multivitamin w/minerals  1 tablet Oral Daily     nystatin  1,000,000 Units Swish & Swallow 4x Daily     pantoprazole (PROTONIX) IV  40 mg Intravenous BID     QUEtiapine  150 mg Oral or Feeding Tube At Bedtime     QUEtiapine  25 mg Oral BID     saline nasal   Each Nare At Bedtime     sertraline  100 mg Oral Daily     sodium chloride  2 spray Both Nostrils Q4H     sodium chloride (PF)  10-40 mL Intracatheter Q8H     sodium chloride (PF)  10-40 mL Intracatheter Q8H     warfarin ANTICOAGULANT  5 mg Oral ONCE at 18:00     Warfarin Therapy Reminder  1 each Oral See Admin Instructions     [Held by provider] zolpidem  5 mg Oral At Bedtime       dextrose Stopped (08/09/22 1430)     heparin 1,100 Units/hr (08/16/22 0901)       Labs:   CMP  Recent Labs   Lab 08/16/22  0350 08/15/22  2147 08/15/22  1659 08/15/22  0439 08/14/22  1549 08/14/22  0642 08/13/22  1132 08/13/22  0450 08/12/22  0602  08/11/22  0815 08/11/22  0709     --   --  138 138 138  --  139 135*  --  135*   POTASSIUM 3.0*  --   --  3.6 3.9 3.7   < > 2.9* 4.0  --  3.8   CHLORIDE 96*  --   --  100 101 103  --  103 107  --  107   CO2 29  --   --  25 23 23  --  24 18*  --  18*   ANIONGAP 11  --   --  13 14 12  --  12 10  --  10   *  --   --  99 91 89  --  103* 103*   < > 99   BUN 12.4  --   --  12.8 11.5 12.3  --  12.2 11.7  --  12.5   CR 0.72  --   --  0.74 0.67 0.70  --  0.69 0.60*  --  0.65*   GFRESTIMATED >90  --   --  >90 >90 >90  --  >90 >90  --  >90   ELDA 9.1  --   --  9.0 9.2 9.2  --  8.8 8.8  --  9.0   MAG 2.4* 2.9* 3.6* 1.5*  --  1.8  --  1.6* 2.0  --  2.1   PHOS  --   --   --   --   --  3.8  --  4.0 3.5  --  3.6   PROTTOTAL 7.3  --   --  7.1  --  6.7  --  6.4 6.3*  --  6.4   ALBUMIN 3.5  --   --  3.3*  --  3.1*  --  3.0* 3.0*  --  3.0*   BILITOTAL 0.6  --   --  0.6  --  0.6  --  0.6 0.6  --  0.6   ALKPHOS 148*  --   --  153*  --  141*  --  134* 127  --  134*   AST 23  --   --  28  --  22  --  21 26  --  23   ALT 8*  --   --  10  --  8*  --  8* 12  --  12    < > = values in this interval not displayed.     CBC  Recent Labs   Lab 08/16/22  0350 08/15/22  1659 08/15/22  0819 08/15/22  0439 08/14/22  1536 08/14/22  0642   WBC 8.0  --  9.8 8.4  --  9.6   RBC 3.58*  --  3.74* 3.98*  --  3.62*   HGB 10.3* 11.0* 11.0* 11.7*   < > 10.4*  10.4*   HCT 32.9*  --  34.7* 38.4*  --  34.2*   MCV 92  --  93 97  --  95   MCH 28.8  --  29.4 29.4  --  28.7   MCHC 31.3*  --  31.7 30.5*  --  30.4*   RDW 17.1*  --  17.4* 17.6*  --  17.5*     --  272 51*  --  267    < > = values in this interval not displayed.     Arterial Blood Gas  No lab results found in last 7 days.    I have reviewed today's vital signs, notes, medications, labs and imaging.  I have also seen and examined the patient and agree with the findings and plan as outlined above.  Pt with family at bedside having LVAD training courses (education).  No complaints.  Had low  PI events secondary to hypovolemia holding diuretics.  Afebrile with HR 90s and /81 with flow 3.7lpm, PI 4.6 and speed 5,300 with Cr 0.7 and INR 1.95.  Plan is to continue to monitor HM3, hold diuretics, replace K for hypokalemia secondary to diuresis and continue to monitor anticoag.  Plan discussed with pt and team.     Darvin Sahni MD, PhD  Professor, Heart Failure and Cardiac Transplantation  [facility]

## 2022-08-17 ENCOUNTER — APPOINTMENT (OUTPATIENT)
Dept: OCCUPATIONAL THERAPY | Facility: CLINIC | Age: 62
DRG: 001 | End: 2022-08-17
Attending: INTERNAL MEDICINE
Payer: COMMERCIAL

## 2022-08-17 ENCOUNTER — APPOINTMENT (OUTPATIENT)
Dept: PHYSICAL THERAPY | Facility: CLINIC | Age: 62
DRG: 001 | End: 2022-08-17
Attending: INTERNAL MEDICINE
Payer: COMMERCIAL

## 2022-08-17 LAB
ALBUMIN SERPL BCG-MCNC: 3.4 G/DL (ref 3.5–5.2)
ALP SERPL-CCNC: 147 U/L (ref 40–129)
ALT SERPL W P-5'-P-CCNC: 9 U/L (ref 10–50)
ANION GAP SERPL CALCULATED.3IONS-SCNC: 10 MMOL/L (ref 7–15)
AST SERPL W P-5'-P-CCNC: 24 U/L (ref 10–50)
BILIRUB SERPL-MCNC: 0.5 MG/DL
BUN SERPL-MCNC: 15.7 MG/DL (ref 8–23)
CA-I BLD-MCNC: 4.7 MG/DL (ref 4.4–5.2)
CALCIUM SERPL-MCNC: 9.1 MG/DL (ref 8.8–10.2)
CHLORIDE SERPL-SCNC: 98 MMOL/L (ref 98–107)
CREAT SERPL-MCNC: 0.63 MG/DL (ref 0.67–1.17)
DEPRECATED HCO3 PLAS-SCNC: 28 MMOL/L (ref 22–29)
ERYTHROCYTE [DISTWIDTH] IN BLOOD BY AUTOMATED COUNT: 17.1 % (ref 10–15)
GFR SERPL CREATININE-BSD FRML MDRD: >90 ML/MIN/1.73M2
GLUCOSE SERPL-MCNC: 96 MG/DL (ref 70–99)
HCT VFR BLD AUTO: 32.4 % (ref 40–53)
HGB BLD-MCNC: 10.2 G/DL (ref 13.3–17.7)
HGB BLD-MCNC: 10.2 G/DL (ref 13.3–17.7)
HGB BLD-MCNC: 10.6 G/DL (ref 13.3–17.7)
INR PPP: 2.34 (ref 0.85–1.15)
LACTATE SERPL-SCNC: 0.6 MMOL/L (ref 0.7–2)
LDH SERPL L TO P-CCNC: 302 U/L (ref 0–250)
MCH RBC QN AUTO: 29.6 PG (ref 26.5–33)
MCHC RBC AUTO-ENTMCNC: 31.5 G/DL (ref 31.5–36.5)
MCV RBC AUTO: 94 FL (ref 78–100)
PLATELET # BLD AUTO: 250 10E3/UL (ref 150–450)
POTASSIUM SERPL-SCNC: 3.9 MMOL/L (ref 3.4–5.3)
PROT SERPL-MCNC: 7 G/DL (ref 6.4–8.3)
RBC # BLD AUTO: 3.45 10E6/UL (ref 4.4–5.9)
SODIUM SERPL-SCNC: 136 MMOL/L (ref 136–145)
UFH PPP CHRO-ACNC: 0.43 IU/ML
WBC # BLD AUTO: 6.3 10E3/UL (ref 4–11)

## 2022-08-17 PROCEDURE — 97535 SELF CARE MNGMENT TRAINING: CPT | Mod: GO

## 2022-08-17 PROCEDURE — 80053 COMPREHEN METABOLIC PANEL: CPT | Performed by: INTERNAL MEDICINE

## 2022-08-17 PROCEDURE — 250N000013 HC RX MED GY IP 250 OP 250 PS 637: Performed by: PHYSICIAN ASSISTANT

## 2022-08-17 PROCEDURE — 83615 LACTATE (LD) (LDH) ENZYME: CPT

## 2022-08-17 PROCEDURE — 83605 ASSAY OF LACTIC ACID: CPT | Performed by: INTERNAL MEDICINE

## 2022-08-17 PROCEDURE — 274N000003 HC DEVICE HM 14-V LITHIUM BATTERY, INITIAL ONLY, EACH

## 2022-08-17 PROCEDURE — 214N000001 HC R&B CCU UMMC

## 2022-08-17 PROCEDURE — 274N000006 HC DEVICE HM POCKET SHOWER BAG, INITIAL ONLY, EACH

## 2022-08-17 PROCEDURE — 250N000011 HC RX IP 250 OP 636

## 2022-08-17 PROCEDURE — 250N000013 HC RX MED GY IP 250 OP 250 PS 637: Performed by: STUDENT IN AN ORGANIZED HEALTH CARE EDUCATION/TRAINING PROGRAM

## 2022-08-17 PROCEDURE — 85610 PROTHROMBIN TIME: CPT | Performed by: INTERNAL MEDICINE

## 2022-08-17 PROCEDURE — 36592 COLLECT BLOOD FROM PICC: CPT | Performed by: INTERNAL MEDICINE

## 2022-08-17 PROCEDURE — 99233 SBSQ HOSP IP/OBS HIGH 50: CPT | Mod: GC | Performed by: INTERNAL MEDICINE

## 2022-08-17 PROCEDURE — 274N000009 HC DEVICE HM UNIVERSAL BATTERY CHARGER, INITIAL ONLY, EACH

## 2022-08-17 PROCEDURE — 250N000013 HC RX MED GY IP 250 OP 250 PS 637: Performed by: INTERNAL MEDICINE

## 2022-08-17 PROCEDURE — 97116 GAIT TRAINING THERAPY: CPT | Mod: GP | Performed by: PHYSICAL THERAPIST

## 2022-08-17 PROCEDURE — 93005 ELECTROCARDIOGRAM TRACING: CPT

## 2022-08-17 PROCEDURE — 36592 COLLECT BLOOD FROM PICC: CPT | Performed by: STUDENT IN AN ORGANIZED HEALTH CARE EDUCATION/TRAINING PROGRAM

## 2022-08-17 PROCEDURE — 250N000011 HC RX IP 250 OP 636: Performed by: STUDENT IN AN ORGANIZED HEALTH CARE EDUCATION/TRAINING PROGRAM

## 2022-08-17 PROCEDURE — 93010 ELECTROCARDIOGRAM REPORT: CPT | Performed by: INTERNAL MEDICINE

## 2022-08-17 PROCEDURE — 250N000013 HC RX MED GY IP 250 OP 250 PS 637: Performed by: SURGERY

## 2022-08-17 PROCEDURE — 97530 THERAPEUTIC ACTIVITIES: CPT | Mod: GP | Performed by: PHYSICAL THERAPIST

## 2022-08-17 PROCEDURE — 82330 ASSAY OF CALCIUM: CPT | Performed by: INTERNAL MEDICINE

## 2022-08-17 PROCEDURE — 85018 HEMOGLOBIN: CPT | Performed by: STUDENT IN AN ORGANIZED HEALTH CARE EDUCATION/TRAINING PROGRAM

## 2022-08-17 PROCEDURE — 85027 COMPLETE CBC AUTOMATED: CPT | Performed by: INTERNAL MEDICINE

## 2022-08-17 PROCEDURE — 999N000007 HC SITE CHECK

## 2022-08-17 PROCEDURE — 274N000012 HC DEVICE HM POWER UNIT MOBILE W/AC PWR CABLE, INITIAL ONLY, EACH

## 2022-08-17 PROCEDURE — 250N000013 HC RX MED GY IP 250 OP 250 PS 637

## 2022-08-17 PROCEDURE — 85520 HEPARIN ASSAY: CPT | Performed by: INTERNAL MEDICINE

## 2022-08-17 RX ORDER — HYDRALAZINE HYDROCHLORIDE 25 MG/1
25 TABLET, FILM COATED ORAL EVERY 8 HOURS SCHEDULED
Status: DISCONTINUED | OUTPATIENT
Start: 2022-08-17 | End: 2022-08-20

## 2022-08-17 RX ORDER — WARFARIN SODIUM 5 MG/1
5 TABLET ORAL
Status: COMPLETED | OUTPATIENT
Start: 2022-08-17 | End: 2022-08-17

## 2022-08-17 RX ADMIN — Medication 10 MG: at 21:28

## 2022-08-17 RX ADMIN — SALINE NASAL SPRAY 2 SPRAY: 1.5 SOLUTION NASAL at 03:10

## 2022-08-17 RX ADMIN — ATORVASTATIN CALCIUM 40 MG: 40 TABLET, FILM COATED ORAL at 20:07

## 2022-08-17 RX ADMIN — CAPTOPRIL 12.5 MG: 12.5 TABLET ORAL at 08:02

## 2022-08-17 RX ADMIN — QUETIAPINE FUMARATE 25 MG: 25 TABLET ORAL at 08:01

## 2022-08-17 RX ADMIN — WARFARIN SODIUM 5 MG: 5 TABLET ORAL at 18:28

## 2022-08-17 RX ADMIN — SALINE NASAL SPRAY 2 SPRAY: 1.5 SOLUTION NASAL at 16:37

## 2022-08-17 RX ADMIN — CAPTOPRIL 12.5 MG: 12.5 TABLET ORAL at 20:07

## 2022-08-17 RX ADMIN — QUETIAPINE FUMARATE 25 MG: 25 TABLET ORAL at 16:37

## 2022-08-17 RX ADMIN — HYDROXYCHLOROQUINE SULFATE 200 MG: 200 TABLET, FILM COATED ORAL at 08:02

## 2022-08-17 RX ADMIN — SALINE NASAL SPRAY 2 SPRAY: 1.5 SOLUTION NASAL at 08:03

## 2022-08-17 RX ADMIN — Medication 5 ML: at 16:05

## 2022-08-17 RX ADMIN — ACETAMINOPHEN 975 MG: 325 TABLET, FILM COATED ORAL at 20:06

## 2022-08-17 RX ADMIN — Medication 1 TABLET: at 08:01

## 2022-08-17 RX ADMIN — HYDRALAZINE HYDROCHLORIDE 25 MG: 25 TABLET, FILM COATED ORAL at 21:29

## 2022-08-17 RX ADMIN — Medication 5 ML: at 10:52

## 2022-08-17 RX ADMIN — SALINE NASAL SPRAY 2 SPRAY: 1.5 SOLUTION NASAL at 20:07

## 2022-08-17 RX ADMIN — ACETAMINOPHEN 975 MG: 325 TABLET, FILM COATED ORAL at 11:35

## 2022-08-17 RX ADMIN — ACETAMINOPHEN 975 MG: 325 TABLET, FILM COATED ORAL at 03:10

## 2022-08-17 RX ADMIN — SERTRALINE HYDROCHLORIDE 100 MG: 100 TABLET ORAL at 08:01

## 2022-08-17 RX ADMIN — HYDRALAZINE HYDROCHLORIDE 25 MG: 25 TABLET, FILM COATED ORAL at 14:23

## 2022-08-17 RX ADMIN — DIGOXIN 125 MCG: 125 TABLET ORAL at 08:01

## 2022-08-17 RX ADMIN — HYDROXYCHLOROQUINE SULFATE 200 MG: 200 TABLET, FILM COATED ORAL at 20:07

## 2022-08-17 RX ADMIN — PANTOPRAZOLE SODIUM 40 MG: 40 TABLET, DELAYED RELEASE ORAL at 08:01

## 2022-08-17 RX ADMIN — SALINE NASAL SPRAY 2 SPRAY: 1.5 SOLUTION NASAL at 11:34

## 2022-08-17 RX ADMIN — QUETIAPINE FUMARATE 150 MG: 50 TABLET ORAL at 21:28

## 2022-08-17 RX ADMIN — HEPARIN SODIUM 1100 UNITS/HR: 10000 INJECTION, SOLUTION INTRAVENOUS at 03:01

## 2022-08-17 RX ADMIN — ASPIRIN 81 MG CHEWABLE TABLET 81 MG: 81 TABLET CHEWABLE at 11:35

## 2022-08-17 RX ADMIN — Medication 5 ML: at 08:18

## 2022-08-17 RX ADMIN — CAPTOPRIL 12.5 MG: 12.5 TABLET ORAL at 14:23

## 2022-08-17 ASSESSMENT — ACTIVITIES OF DAILY LIVING (ADL)
ADLS_ACUITY_SCORE: 36
ADLS_ACUITY_SCORE: 38
ADLS_ACUITY_SCORE: 36
ADLS_ACUITY_SCORE: 38
ADLS_ACUITY_SCORE: 35
ADLS_ACUITY_SCORE: 38
ADLS_ACUITY_SCORE: 36

## 2022-08-17 NOTE — PROGRESS NOTES
[facility]   Cards 2 - Progress Note    Dandy Sands MRN: [id number]  Age: 61 year old, : 1960  Date: 2022    Assessment and Plan:  Dandy Sands is a 61 year old male with PMH of lupus, antiphospholipid syndrome, HTN, HLD, HFrEF 2/2 ICM who presented with cardiogenic shock c/b multiorgan failure (JOSE, shock liver) requiring mechanical support with IABP, now s/p HM3 LVAD 22 by Dr. Zamora with intraoperative course c/b RV failure s/p 29F Protek duo via RIJ. Post op course c/b hemopericardium s/p repeat washout with chest left open. Chest closed 22 and decannulated from RVAD . Developed epistaxis on  night which was packed by ENT. Unfortunately bleeding persisted and there was concern for airway protection on  AM which prompted intubation and transfer to ICU. Patient has since been transferred to the floor and removed his own nasal packing in the evening .     Changes today:  - stop low-intensity heparin drip + continue Warfarin (INR 2.34 today). Restart aspirin  - Restart hydral at 25 TID (from 37.5) and if BP can tolerate it will increase and add bumex    #HFrEF 2/2 ICM s/p HM3 LVAD in setting of cardiogenic shock (SCAI D)  #RV failure s/p Protek duo temporary RVAD s/p decannulation   #RV thrombus  #Post chest closure hemopericardium s/p repeat washout ()  #PMH CAD s/p PCI to LAD ()  #S/p CRT-D ()  Patient with ICM s/p HM3 LVAD 22 by Dr. Zamora in setting of presentation for cardiogenic shock requiring MCS with IABP as prior to HM (initially evaluated for heart transplant, however noted to have substantially elevated DSAs during course of care, so decision made to proceed with LVAD given patient already on temporary MCS). Intraoperative course c/b RV failure resulting in cardiogenic shock requiring high dose pressors necessitating RVAD support. Initial post-op course c/b hemopericardium requiring repeat washout with chest left  open, however has since recovered well with decannulation on 7/21 with extubation day prior. Has continued to tolerate well from hemodynamic and end organ standpoint.   - Volume status: euvolemic  - LVAD: low flow alarms flow 1.8-2.4. Restart hydral at 25 TID (from 37.5) and if BP can tolerate it will increase and add bumex  - continue captopril 6.25 mg tid  - continue digoxin for RV support  - stop low-intensity heparin drip + continue Warfarin (INR 2.34 today). Restart aspirin  - Of note, patient with climbing capture threshold of RV lead; will need to be evaluated in future by EP team    #Epistaxis  #Intubated due to Concern for airway protection (8/7)-Extubated (8/8)  #Mild-moderate emphysema  #History of COVID-19  #Iatrogenic R apical PTX  H/o nasal perforation that required elective procedure (in [place]) which was postponed due to LVAD insertion. Developed epistaxis not fixed by packing overnight on 8/6 and pt continued to bleed. Intubated due to concern for ability to maintain airway from bleeding. Controlled at bedside by ENT s/p cautery and packing. Appreciate assistance. Extubated 8/8. Cefepime/Vanc empirically for broad coverage for PNA (8/7-8/12). Patient removed his own nasal packing overnight on 8/9. ENT will discuss long-term plan regarding septal perforation with pt post extubation.  -Continue Breo-Ellipta  -Epistaxis management per ENT    #Insomnia/Delirium  Some concern that he had a fall overnight on 8/6. Discussed with nursing staff and he did NOT have a fall, although he did try to get out of bed. CT head was obtained (8/7) and it was unremarkable.  -Per psych recs:   - discontinue olanzapine   - Sertraline and Quetiapine per psych recs    #GERD  #Congestive hepatopathy in the setting of cardiac insuffiency - Resolved  #Hematemesis / oral mucosal bleeding -resolved    #Dysphagia  GI consulted 7/31 for black tarry stools and a possible GI bleed, however it is more likely swallowed blood from  epistaxis that patient previously had. GI did not recommend an upper endoscopy. Recurrence of bloody stools likely due to epistaxis which have resolved. Will monitor Hb.  -transition PPI IV to oral given there was no obvious GI bleeding  -cleared for regular diet by SLP    #E.faecalis on culture from PICC line (8/2/22) - 1/2 tubes in 1/4 sets collected 8/2  #S.epidermidis (MSSE) on culture from PICC line (8/2/22) - 1/2 tubes in 1/4 sets collected 8/2  Vanc was supposed to end on 8/7 but given massive epistaxis will start broad spectrum antibiotics  -Cefepime/Vanc empirically for broad coverage for PNA (8/7-8/12)    #Anemia due to blood loss from Epistaxis-Resolved  #History of DVT and PE   #Antiphospholipid antibody syndrome  #History of iron deficiency anemia  -monitor CBC qday; transfuse to keep Hb>7  -Continue low-intensity heparin gtt and hold ASA while monitoring for epistaxis    #SLE  - PTA meds: hydroxychloroquine 200mg, resumed 7/16  - Will need cellcept restarted when appropriate    #Oral thrush  Completed 14 days of nystatin   - discontinue nystatin today    Pertinent Lines  PIV    Feeds: PO diet  DVT Prophylaxis: heparin gtt, warfarin  GI Prophylaxis: PPI    Patient seen and discussed with Dr. Sahni.  Assessment and plan as above.    Cris Hernandes MD  Internal Medicine Resident (PGY-3)  [facility]      Subjective:  Nursing notes reviewed. NAEO. Patient slept well and has no concerns today. Denies CP or SOB. Denies further epistaxis.    Objective Findings:  Temp:  [97.4  F (36.3  C)-98  F (36.7  C)] 97.4  F (36.3  C)  Pulse:  [] 97  Resp:  [16-18] 16  BP: ()/(73-90) 97/76  SpO2:  [95 %-100 %] 96 %    Physical Exam:  GEN: Frail  HEENT: No epistaxis noted.  Pulm: both lungs were clear  Cardiac: no JVD, LVAD hum +  GI: soft, non distended  Neuro: alert  Vascular: No lower extremity edema    Medications:    acetaminophen  975 mg Oral or Feeding Tube Q8H CAMMY     aspirin  81 mg Oral or  Feeding Tube Daily     atorvastatin  40 mg Oral QPM     [Held by provider] bumetanide  3 mg Oral BID     captopril  12.5 mg Oral TID     digoxin  125 mcg Oral Daily     fluticasone-vilanterol  1 puff Inhalation Daily     heparin lock flush  5-20 mL Intracatheter Q24H     hydrALAZINE  25 mg Oral Q8H CAMMY     hydroxychloroquine  200 mg Oral BID     ketoconazole   Topical Daily     lidocaine  1 patch Transdermal Q24h    And     lidocaine   Transdermal Q8H CAMMY     melatonin  10 mg Oral At Bedtime     multivitamin w/minerals  1 tablet Oral Daily     pantoprazole  40 mg Oral QAM AC     QUEtiapine  150 mg Oral or Feeding Tube At Bedtime     QUEtiapine  25 mg Oral BID     saline nasal   Each Nare At Bedtime     sertraline  100 mg Oral Daily     sodium chloride  2 spray Both Nostrils Q4H     sodium chloride (PF)  10-40 mL Intracatheter Q8H     sodium chloride (PF)  10-40 mL Intracatheter Q8H     warfarin ANTICOAGULANT  5 mg Oral ONCE at 18:00     Warfarin Therapy Reminder  1 each Oral See Admin Instructions     [Held by provider] zolpidem  5 mg Oral At Bedtime       dextrose Stopped (08/09/22 1430)       Labs:   CMP  Recent Labs   Lab 08/17/22  0539 08/16/22  1310 08/16/22  0350 08/15/22  2147 08/15/22  1659 08/15/22  0439 08/14/22  1549 08/14/22  0642 08/13/22  1132 08/13/22  0450 08/12/22  0602 08/11/22  0815 08/11/22  0709     --  136  --   --  138 138 138  --  139 135*  --  135*   POTASSIUM 3.9 3.7 3.0*  --   --  3.6 3.9 3.7   < > 2.9* 4.0  --  3.8   CHLORIDE 98  --  96*  --   --  100 101 103  --  103 107  --  107   CO2 28  --  29  --   --  25 23 23  --  24 18*  --  18*   ANIONGAP 10  --  11  --   --  13 14 12  --  12 10  --  10   GLC 96  --  108*  --   --  99 91 89  --  103* 103*   < > 99   BUN 15.7  --  12.4  --   --  12.8 11.5 12.3  --  12.2 11.7  --  12.5   CR 0.63*  --  0.72  --   --  0.74 0.67 0.70  --  0.69 0.60*  --  0.65*   GFRESTIMATED >90  --  >90  --   --  >90 >90 >90  --  >90 >90  --  >90   ELDA 9.1  --   9.1  --   --  9.0 9.2 9.2  --  8.8 8.8  --  9.0   MAG  --   --  2.4* 2.9* 3.6* 1.5*  --  1.8  --  1.6* 2.0  --  2.1   PHOS  --   --   --   --   --   --   --  3.8  --  4.0 3.5  --  3.6   PROTTOTAL 7.0  --  7.3  --   --  7.1  --  6.7  --  6.4 6.3*  --  6.4   ALBUMIN 3.4*  --  3.5  --   --  3.3*  --  3.1*  --  3.0* 3.0*  --  3.0*   BILITOTAL 0.5  --  0.6  --   --  0.6  --  0.6  --  0.6 0.6  --  0.6   ALKPHOS 147*  --  148*  --   --  153*  --  141*  --  134* 127  --  134*   AST 24  --  23  --   --  28  --  22  --  21 26  --  23   ALT 9*  --  8*  --   --  10  --  8*  --  8* 12  --  12    < > = values in this interval not displayed.     CBC  Recent Labs   Lab 08/17/22  0539 08/16/22  1656 08/16/22  0350 08/15/22  1659 08/15/22  0819 08/15/22  0439   WBC 6.3  --  8.0  --  9.8 8.4   RBC 3.45*  --  3.58*  --  3.74* 3.98*   HGB 10.2*  10.2* 10.1* 10.3* 11.0* 11.0* 11.7*   HCT 32.4*  --  32.9*  --  34.7* 38.4*   MCV 94  --  92  --  93 97   MCH 29.6  --  28.8  --  29.4 29.4   MCHC 31.5  --  31.3*  --  31.7 30.5*   RDW 17.1*  --  17.1*  --  17.4* 17.6*     --  248  --  272 51*     Arterial Blood Gas  No lab results found in last 7 days.     I have reviewed today's vital signs, notes, medications, labs and imaging.  I have also seen and examined the patient and agree with the findings and plan as outlined above.  Pt with HM3 placement and now anticoagulated on coumadin.  Agree with holding diuresis and advance afterload reducing meds.  Flow, PI and speed are stable for HM3.     Darvin Sahni MD, PhD  Professor, Heart Failure and Cardiac Transplantation  [facility]

## 2022-08-17 NOTE — PROGRESS NOTES
Day 2 VAD Education  Met with patient, DIL-Phalla and son for education today.  Equipment overview reviewed.  Patient recall was about 80% from yesterday.  Dressing change edu reviewed with Melissa.  She completed dressing change on the practice board.  All parties are progressing as expected.

## 2022-08-17 NOTE — PLAN OF CARE
Pt admitted 6/17 with cardiogenic shock s/p IABP and HM III 7/8 c/b RV fail s/p RVAD and de cannulated.  Pace/SR, VS'S (ortho hypotension positive) on RA and denied pain.  HM III LVAD numbers WNL and no alarms noted.   Drive line dressing change with LVAD teaching.  Heparin gtt discontinued as INR therapeutic.  No K replacement needed.  Otherwise, pt resting well and up assist of one.  Hydralazine added this morning and more dizziness noted.  Discharge to rehab possible tomorrow.  Continue to monitor and with POC.

## 2022-08-17 NOTE — PROGRESS NOTES
Day 1 LVAD education  Met with pt, daughter-Asha (via ipad), son-Amos and daughter in law-Tanja, for LVAD education.  Introduced equipment overview, self test and dressing change.  Pt will have 3 caregivers: Amos Barry and Tanja.  Asha and Tanja will both be trained in dressing change.    Writer completed dressing change and removed surgical stitch per protocol.  Some rash noted under the adhesive portion of the dressing.  Cavilon skin protectant used and dressing moved in order to avoid the rash area.

## 2022-08-17 NOTE — PLAN OF CARE
D: Admitted 8/16 with cardiogenic shock c/b multiorgan failure (JOSE, shock liver) requiring mechanical support with IABP, now s/p HM3 LVAD 7/8/22.     I: Monitored vitals and assessed pt status.   Running: heparin gtt at 1100 units/hr, next 10a check tomorrow AM     A: A&Ox4. On room air. SR/ST, 90's-100's VSS, afebrile. One low flow alarms this AM while patient was asleep, flow 2.3 and PI 9.1 during event. Driveline dressing CDI. Sternal incision WNL, open to air. Urinating adequately. LBM 8/16. Assist x1, endorses dizziness with activity.      P: Continue to monitor. LVAD education today.

## 2022-08-18 ENCOUNTER — APPOINTMENT (OUTPATIENT)
Dept: PHYSICAL THERAPY | Facility: CLINIC | Age: 62
DRG: 001 | End: 2022-08-18
Attending: INTERNAL MEDICINE
Payer: COMMERCIAL

## 2022-08-18 LAB
ALBUMIN SERPL BCG-MCNC: 3.5 G/DL (ref 3.5–5.2)
ALP SERPL-CCNC: 152 U/L (ref 40–129)
ALT SERPL W P-5'-P-CCNC: 9 U/L (ref 10–50)
ANION GAP SERPL CALCULATED.3IONS-SCNC: 10 MMOL/L (ref 7–15)
AST SERPL W P-5'-P-CCNC: 26 U/L (ref 10–50)
ATRIAL RATE - MUSE: 32 BPM
ATRIAL RATE - MUSE: 70 BPM
BILIRUB SERPL-MCNC: 0.5 MG/DL
BUN SERPL-MCNC: 14.7 MG/DL (ref 8–23)
CA-I BLD-MCNC: 4.8 MG/DL (ref 4.4–5.2)
CALCIUM SERPL-MCNC: 9.4 MG/DL (ref 8.8–10.2)
CHLORIDE SERPL-SCNC: 100 MMOL/L (ref 98–107)
CREAT SERPL-MCNC: 0.59 MG/DL (ref 0.67–1.17)
DEPRECATED HCO3 PLAS-SCNC: 27 MMOL/L (ref 22–29)
DIASTOLIC BLOOD PRESSURE - MUSE: NORMAL MMHG
DIASTOLIC BLOOD PRESSURE - MUSE: NORMAL MMHG
ERYTHROCYTE [DISTWIDTH] IN BLOOD BY AUTOMATED COUNT: 17 % (ref 10–15)
GFR SERPL CREATININE-BSD FRML MDRD: >90 ML/MIN/1.73M2
GLUCOSE SERPL-MCNC: 100 MG/DL (ref 70–99)
HCT VFR BLD AUTO: 33.9 % (ref 40–53)
HGB BLD-MCNC: 10.4 G/DL (ref 13.3–17.7)
HGB BLD-MCNC: 9.9 G/DL (ref 13.3–17.7)
INR PPP: 1.99 (ref 0.85–1.15)
INTERPRETATION ECG - MUSE: NORMAL
INTERPRETATION ECG - MUSE: NORMAL
LACTATE SERPL-SCNC: 0.7 MMOL/L (ref 0.7–2)
LDH SERPL L TO P-CCNC: 293 U/L (ref 0–250)
MAGNESIUM SERPL-MCNC: 1.9 MG/DL (ref 1.7–2.3)
MCH RBC QN AUTO: 29 PG (ref 26.5–33)
MCHC RBC AUTO-ENTMCNC: 30.7 G/DL (ref 31.5–36.5)
MCV RBC AUTO: 94 FL (ref 78–100)
P AXIS - MUSE: NORMAL DEGREES
P AXIS - MUSE: NORMAL DEGREES
PLATELET # BLD AUTO: 255 10E3/UL (ref 150–450)
POTASSIUM SERPL-SCNC: 4 MMOL/L (ref 3.4–5.3)
PR INTERVAL - MUSE: NORMAL MS
PR INTERVAL - MUSE: NORMAL MS
PROT SERPL-MCNC: 7.2 G/DL (ref 6.4–8.3)
QRS DURATION - MUSE: 144 MS
QRS DURATION - MUSE: 162 MS
QT - MUSE: 422 MS
QT - MUSE: 460 MS
QTC - MUSE: 533 MS
QTC - MUSE: 590 MS
R AXIS - MUSE: -67 DEGREES
R AXIS - MUSE: 238 DEGREES
RBC # BLD AUTO: 3.59 10E6/UL (ref 4.4–5.9)
SODIUM SERPL-SCNC: 137 MMOL/L (ref 136–145)
SYSTOLIC BLOOD PRESSURE - MUSE: NORMAL MMHG
SYSTOLIC BLOOD PRESSURE - MUSE: NORMAL MMHG
T AXIS - MUSE: 126 DEGREES
T AXIS - MUSE: 89 DEGREES
UFH PPP CHRO-ACNC: <0.1 IU/ML
VENTRICULAR RATE- MUSE: 96 BPM
VENTRICULAR RATE- MUSE: 99 BPM
WBC # BLD AUTO: 7.1 10E3/UL (ref 4–11)

## 2022-08-18 PROCEDURE — 83615 LACTATE (LD) (LDH) ENZYME: CPT

## 2022-08-18 PROCEDURE — 214N000001 HC R&B CCU UMMC

## 2022-08-18 PROCEDURE — 36592 COLLECT BLOOD FROM PICC: CPT | Performed by: INTERNAL MEDICINE

## 2022-08-18 PROCEDURE — 250N000013 HC RX MED GY IP 250 OP 250 PS 637

## 2022-08-18 PROCEDURE — 250N000013 HC RX MED GY IP 250 OP 250 PS 637: Performed by: PHYSICIAN ASSISTANT

## 2022-08-18 PROCEDURE — 250N000013 HC RX MED GY IP 250 OP 250 PS 637: Performed by: STUDENT IN AN ORGANIZED HEALTH CARE EDUCATION/TRAINING PROGRAM

## 2022-08-18 PROCEDURE — 93010 ELECTROCARDIOGRAM REPORT: CPT | Performed by: INTERNAL MEDICINE

## 2022-08-18 PROCEDURE — 82330 ASSAY OF CALCIUM: CPT | Performed by: INTERNAL MEDICINE

## 2022-08-18 PROCEDURE — 36592 COLLECT BLOOD FROM PICC: CPT | Performed by: STUDENT IN AN ORGANIZED HEALTH CARE EDUCATION/TRAINING PROGRAM

## 2022-08-18 PROCEDURE — 99233 SBSQ HOSP IP/OBS HIGH 50: CPT | Mod: GC | Performed by: INTERNAL MEDICINE

## 2022-08-18 PROCEDURE — 83735 ASSAY OF MAGNESIUM: CPT | Performed by: NURSE PRACTITIONER

## 2022-08-18 PROCEDURE — 80053 COMPREHEN METABOLIC PANEL: CPT | Performed by: INTERNAL MEDICINE

## 2022-08-18 PROCEDURE — 97110 THERAPEUTIC EXERCISES: CPT | Mod: GP | Performed by: PHYSICAL THERAPIST

## 2022-08-18 PROCEDURE — 85027 COMPLETE CBC AUTOMATED: CPT | Performed by: INTERNAL MEDICINE

## 2022-08-18 PROCEDURE — 250N000013 HC RX MED GY IP 250 OP 250 PS 637: Performed by: SURGERY

## 2022-08-18 PROCEDURE — 85610 PROTHROMBIN TIME: CPT | Performed by: INTERNAL MEDICINE

## 2022-08-18 PROCEDURE — 93005 ELECTROCARDIOGRAM TRACING: CPT

## 2022-08-18 PROCEDURE — 83605 ASSAY OF LACTIC ACID: CPT | Performed by: INTERNAL MEDICINE

## 2022-08-18 PROCEDURE — 250N000011 HC RX IP 250 OP 636

## 2022-08-18 PROCEDURE — 250N000013 HC RX MED GY IP 250 OP 250 PS 637: Performed by: INTERNAL MEDICINE

## 2022-08-18 PROCEDURE — 85018 HEMOGLOBIN: CPT | Performed by: STUDENT IN AN ORGANIZED HEALTH CARE EDUCATION/TRAINING PROGRAM

## 2022-08-18 PROCEDURE — 85520 HEPARIN ASSAY: CPT | Performed by: INTERNAL MEDICINE

## 2022-08-18 PROCEDURE — 258N000003 HC RX IP 258 OP 636: Performed by: STUDENT IN AN ORGANIZED HEALTH CARE EDUCATION/TRAINING PROGRAM

## 2022-08-18 RX ORDER — BUMETANIDE 1 MG/1
1 TABLET ORAL
Status: DISCONTINUED | OUTPATIENT
Start: 2022-08-18 | End: 2022-08-20

## 2022-08-18 RX ORDER — LORATADINE 10 MG/1
10 TABLET ORAL DAILY PRN
Status: DISCONTINUED | OUTPATIENT
Start: 2022-08-18 | End: 2022-08-21 | Stop reason: HOSPADM

## 2022-08-18 RX ORDER — LORATADINE 10 MG/1
10 TABLET ORAL DAILY
Status: DISCONTINUED | OUTPATIENT
Start: 2022-08-18 | End: 2022-08-18

## 2022-08-18 RX ORDER — WARFARIN SODIUM 7.5 MG/1
7.5 TABLET ORAL
Status: COMPLETED | OUTPATIENT
Start: 2022-08-18 | End: 2022-08-18

## 2022-08-18 RX ADMIN — Medication 5 ML: at 12:02

## 2022-08-18 RX ADMIN — Medication 10 MG: at 20:44

## 2022-08-18 RX ADMIN — SODIUM CHLORIDE 500 ML: 9 INJECTION, SOLUTION INTRAVENOUS at 13:51

## 2022-08-18 RX ADMIN — HYDROXYCHLOROQUINE SULFATE 200 MG: 200 TABLET, FILM COATED ORAL at 20:44

## 2022-08-18 RX ADMIN — ACETAMINOPHEN 975 MG: 325 TABLET, FILM COATED ORAL at 12:02

## 2022-08-18 RX ADMIN — QUETIAPINE FUMARATE 150 MG: 50 TABLET ORAL at 20:43

## 2022-08-18 RX ADMIN — QUETIAPINE FUMARATE 25 MG: 25 TABLET ORAL at 08:10

## 2022-08-18 RX ADMIN — CAPTOPRIL 12.5 MG: 12.5 TABLET ORAL at 08:11

## 2022-08-18 RX ADMIN — Medication 1 TABLET: at 08:10

## 2022-08-18 RX ADMIN — LIDOCAINE PATCH 4% 1 PATCH: 40 PATCH TOPICAL at 08:12

## 2022-08-18 RX ADMIN — ACETAMINOPHEN 650 MG: 325 TABLET, FILM COATED ORAL at 08:09

## 2022-08-18 RX ADMIN — OXYCODONE HYDROCHLORIDE 5 MG: 5 TABLET ORAL at 20:42

## 2022-08-18 RX ADMIN — SALINE NASAL SPRAY 2 SPRAY: 1.5 SOLUTION NASAL at 08:11

## 2022-08-18 RX ADMIN — SALINE NASAL SPRAY 2 SPRAY: 1.5 SOLUTION NASAL at 19:15

## 2022-08-18 RX ADMIN — SERTRALINE HYDROCHLORIDE 100 MG: 100 TABLET ORAL at 08:09

## 2022-08-18 RX ADMIN — CAPTOPRIL 12.5 MG: 12.5 TABLET ORAL at 14:39

## 2022-08-18 RX ADMIN — HYDRALAZINE HYDROCHLORIDE 25 MG: 25 TABLET, FILM COATED ORAL at 20:44

## 2022-08-18 RX ADMIN — SODIUM CHLORIDE 250 ML: 9 INJECTION, SOLUTION INTRAVENOUS at 12:51

## 2022-08-18 RX ADMIN — PANTOPRAZOLE SODIUM 40 MG: 40 TABLET, DELAYED RELEASE ORAL at 08:10

## 2022-08-18 RX ADMIN — CAPTOPRIL 12.5 MG: 12.5 TABLET ORAL at 20:44

## 2022-08-18 RX ADMIN — DIGOXIN 125 MCG: 125 TABLET ORAL at 08:09

## 2022-08-18 RX ADMIN — ATORVASTATIN CALCIUM 40 MG: 40 TABLET, FILM COATED ORAL at 20:42

## 2022-08-18 RX ADMIN — HYDROXYCHLOROQUINE SULFATE 200 MG: 200 TABLET, FILM COATED ORAL at 08:10

## 2022-08-18 RX ADMIN — WARFARIN SODIUM 7.5 MG: 7.5 TABLET ORAL at 16:42

## 2022-08-18 RX ADMIN — QUETIAPINE FUMARATE 25 MG: 25 TABLET ORAL at 16:41

## 2022-08-18 RX ADMIN — ACETAMINOPHEN 975 MG: 325 TABLET, FILM COATED ORAL at 20:42

## 2022-08-18 RX ADMIN — HYDRALAZINE HYDROCHLORIDE 25 MG: 25 TABLET, FILM COATED ORAL at 06:37

## 2022-08-18 RX ADMIN — HYDRALAZINE HYDROCHLORIDE 25 MG: 25 TABLET, FILM COATED ORAL at 14:40

## 2022-08-18 RX ADMIN — SALINE NASAL SPRAY 2 SPRAY: 1.5 SOLUTION NASAL at 16:42

## 2022-08-18 RX ADMIN — ASPIRIN 81 MG CHEWABLE TABLET 81 MG: 81 TABLET CHEWABLE at 08:10

## 2022-08-18 RX ADMIN — SALINE NASAL SPRAY 2 SPRAY: 1.5 SOLUTION NASAL at 12:02

## 2022-08-18 ASSESSMENT — ACTIVITIES OF DAILY LIVING (ADL)
ADLS_ACUITY_SCORE: 39
ADLS_ACUITY_SCORE: 35
ADLS_ACUITY_SCORE: 35
ADLS_ACUITY_SCORE: 39
ADLS_ACUITY_SCORE: 35
ADLS_ACUITY_SCORE: 38
ADLS_ACUITY_SCORE: 39
ADLS_ACUITY_SCORE: 38

## 2022-08-18 NOTE — PROGRESS NOTES
LVAD Social Work Services Progress Note      Date of Initial Social Work Evaluation: 5/18/2022 with admission assessment completed on 6/20/2022  Collaborated with: Patient, Son and Son's anneliese, along with Cards II,  Rehab liaison, and the VAD coordinator    Data: Dandy remains on 6C recovering status post-VAD. His confusion/delirium has gotten much better. He is feeding tube is out and he seems to be stable from a bleeding standpoint. VAD education started this week and he is on teaching day #3. Son and Son's Anneliese are present going through education and Dtr Asha has been on video. They will be able to finish education at rehab and the team is reporting that Dandy should be medically ready for discharge tomorrow. [facility] admissions is following him for Acute Rehab. They submitted for insurance authorization yesterday.  Intervention: Collaborated with Rehab admissions and Cards II. Updated VAD coordinator and met with the patient, Son and Son's Anneliese to discuss discharge planning. Offered emotional support and inquired into questions/concerns. Explained need for ambulance ride to ARU and that delays in discharge would be related to not getting auth back, or having a bed available or staffing issues. [facility]  Assessment: Patient and family in agreement with discharge to rehab. No other concerns noted. Son did mention questions related to getting his 2 front teeth fixed as they were knocked out during intubation. Will relay concerns to primary team.  Education provided by SW: Discharge planning process  Plan:    Discharge Plans in Progress:  ARU    Barriers to d/c plan: Medical condition-medically ready tomorrow and admission will be pending insurance auth, bed availability and staffing issues.    Follow up Plan: SW will continue to follow for ongoing assistance with discharge planning. Family in [address] and patient will discharge there  from rehab with 24-hour caregiver support provided by family for 30 days.

## 2022-08-18 NOTE — PLAN OF CARE
I/A: A/Ox4. VSS on RA. SR/ST w/ BBB on tele. CASEY. C/o back pain, on uriel tylenol. HM3 #'s WNL, no alarms. Dressing changed, mild rash noted. Sternal incision and old CT sites JOSE ENRIQUE. Tolerating cardiac diet w/ 2L FR, on rosalie counts. Adequate UOP. LBM 8/17. A1, GB+walker. Appeared to sleep comfortably.     P: LVAD edu today. Encourage oral intake and activity. Awaiting TCU/ARU.     Hours of care: [id number]

## 2022-08-18 NOTE — INTERIM SUMMARY
[facility] Pre-Admission Screen    Referral Source:  [facility] [address] [facility] 6418-02  Admit date to referring facility: 6/17/2022    Physical Medicine and Rehab Consult Completed: No    Rehab Diagnosis:    09 Cardiac; s/p HM3 LVAD     Justification for Acute Inpatient Rehabilitation  Dandy Sands is a 61 year old male with PMH of lupus, antiphospholipid syndrome, HTN, HLD, HFrEF 2/2 ICM who presented with cardiogenic shock c/b multiorgan failure (JOSE, shock liver) requiring mechanical support with IABP, now s/p HM3 LVAD 7/8/22 by Dr. Zamora with intraoperative course c/b RV failure s/p 29F Protek duo via RIJ. Post op course c/b hemopericardium s/p repeat washout with chest left open. Chest closed 7/13/22 and decannulated from RVAD 7/21. Developed epistaxis on 8/6 night which was packed by ENT. Patient has h/o nasal perforation that required elective procedure (in [place]) which was postponed due to LVAD insertion Unfortunately, bleeding persisted and there was concern for airway protection on 8/7 AM which prompted intubation and transfer to ICU. Patient has since been extubated (8/8) and transferred to the floor. Nasal packing removed 8/9. He is now stable and ready to transfer to [facility].     Patient requires an intensive inpatient rehab program to address the following acute impairments:impaired strength, impaired activity tolerance and impaired balance. These impairments are impacting his overall safety and functional independence with bed mobility, transfers, gait, stairs, ADL's and IADL's.     Current Active Medical Management Needs/Risks for Clinical Complications  The patient requires the high level of rehabilitation physician supervision that accompanies the provision of intensive rehabilitation therapy.  The patient needs the services of the rehabilitation physician to assess the patient medically and functionally and to modify the course of treatment as needed  to maximize the patient's capacity to benefit from the rehabilitation process.  Cardiac: s/p HM3 LVAD 7/8/22. Provide LVAD education. Sternal incision. Continue captopril 6.25 mg tid. Continue digoxin for RV support. Continue Warfarin (INR 2.34 today). Restart aspirin. Of note, patient with climbing capture threshold of RV lead; will need to be evaluated in future by EP team. Of note, Pt with history of soft BP's.   Epistaxis: In setting of nasal perforation. Latrogenic R apical PTX. Developed epistaxis not fixed by packing overnight on 8/6 Intubated due to concern for airway protection (8/7)-Extubated (8/8). Continue Breo-Ellipta. Epistaxis management per ENT. Will need ongoing assessment.   Systemic Lupus Erythematosus (SLE): Continue PTA meds hydroxychloroquine 200mg; resumed 7/16. Will need cellcept restarted when appropriate. Provide ongoing assessment and management.   Mental Health: In setting of insomnia/delerium. Promote sleep hygiene. Continue sertraline and quetiapine. Will need ongoing assessment.    Prophylaxis: Anemia d/t epistaxis - resolved. History of PE and DVT. H/O iron deficiency anemia. Antiphospholipid antibody syndrome. Continue Heparin gtt, warfarin.  Hold ASA while monitoring for epistaxis.     Patient will need ongoing medical management as he is at risk for falls with or without injury due to gait impairments, muscle weakness, impaired balance, and overall deconditioning with impaired endurance.     Past Medical/Surgical History   Surgery in the past 100 days: Yes   Additional relevant past medical history: Nasal perforation, lupus, antiphospholipid syndrome, HTN, HLD, HFrEF 2/2 ICM.    Level of Functioning Prior to Admission:    LIVING ENVIRONMENT    Number of Stairs, Main Entrance: 3    Stair Railings, Main Entrance: none   Transportation Anticipated: family or friend will provide   Living Environment Comments: Patient lives alone with cat in single level home, 3 [address]. Worked as diesel  .     SELF-CARE   Usual Activity Tolerance: moderate   Regular Exercise: Yes    Activity/Exercise Type: biking   Equipment Currently Used at Home: none   Activity/Exercise/Self-Care Comment: Patient is independent with all mobility and self cares at baseline. Discharge support from [facility] will be patient's son, son's anneliese and his daughter. All are currently at [address] apartments.     Additional Comments: N/A    Level of Function: GG Scale (Section GG Functional Ability and Goals; CMS's PROCTOR Version 3.0 Manual effective 10.1.2019):  PT Current Function Goals for Rehab   Bed Rolling 4 Supervision or touching assitance 6 Independent   Supine to Sit 6 Independent 6 Independent   Sit to Stand 3 Partial/moderate assistance 6 Independent   Transfer 3 Partial/moderate assistance 6 Independent   Ambulation 3 Partial/moderate assistance 6 Independent   Stairs Not completed 4 Supervision or touching assitance     OT Current Function Goals for Rehab   Feeding 5 Setup or clean-up assistance 6 Independent   Grooming 5 Setup or clean-up assistance 6 Independent   Bathing Not completed 6 Independent   Upper Body Dressing 5 Setup or clean-up assistance 6 Independent   Lower Body Dressing 5 Setup or clean-up assistance 6 Independent   Toileting 3 Partial/moderate assistance 6 Independent   Toilet Transfer 3 Partial/moderate assistance 6 Independent   Tub/Shower Transfer Not completed 6 Independent   Cognition Not Impaired Not applicable     SLP Current Function Goals for Rehab   Swallow Not Impaired Not applicable   Communication Not Impaired Not applicable       Current Diet:  0-Thin, 7-Regular/easy to chew and Cardiac; Calorie Counts    Summary Statement:  Patient receiving PT and OT services. Set up assist is required for LVAD management during mobility. Mod I supine <> sit. STS completed with Wiley w/in precautions. Pt able to ambulate to/from bathroom with CGA-min A using walker. Ambulation distance is greatly limited by  fatigue.  Pt requires close chair follow with all ambulation as he takes multiple seated rest breaks. The greatest distance he can ambulate in a single bout is 25'. Using urinal with set up. Able to ambulate to the bathroom and complete toileting with Ax1. Dressing and grooming require set up assist.     Expected Therapies and Services Required During Inpatient Rehab Admission  Intensity of Therapy: Patient requires intensive therapies not available in a lesser level of care. Patient is motivated, making gains, and can tolerate 3 hours of therapy a day.  Physical Therapy: 90 minutes per day, 7 days a week for 14 days  Occupational Therapy: 90 minutes per day, 7 days a week for 14 days  Speech and Language Therapy: No anticipated SLP needs.   Rehabilitation Nursing Needs: Patient requires 24 hour Rehab Nursing to manage medication education, positioning, carryover of new rehab techniques, care coordination, skin integrity, pain management, provide safe environment for patient at falls risk, monitor nutritional intake and LVAD education.    Precautions/restrictions/special needs:   Precautions: fall precautions and Sternal precautions   Restrictions: None   Special Needs: LVAD (LVAD teaching/education)    Expected Level of Improvement: Mod I with transfers, bed mobility, gait and ADL's. Supervision assist with  IADL's and stairs.   Expected Length of time to achieve: 14 days    Anticipated Discharge Needs:  Anticipated Discharge Destination: Home  Anticipated Discharge Support: Family member  24/7 support available : Yes  Identified caregiver(s):  Son, Son's fiancee and patient's daughter.   Anticipated Discharge Needs: Home with homecare and Home with outpatient therapy    Identified challenges/barriers:  Prolonged and complex medical course    Rehab Liaison Signature/Date/Time:     Physician statement of review and agreement:  I have reviewed and am in agreement of the need for IRF stay to address above functional  and medical needs. In addition to above statements address, Patient requires intensive active and ongoing therapeutic intervention and multiple therapies; Patient requires medical supervision; Expected to actively participate in the intensive rehab program; Sufficiently stable to actively participate; Expectation for measurable improvement in functional capacity or adaption to impairments.    Physician Signature/Date/Time:

## 2022-08-18 NOTE — SIGNIFICANT EVENT
"Pt fell while walking to restroom.  Fall was unwitnessed and pt denied hitting head.  Two nurses helped pt getting up.  Pt had LVAD controller in [facility] on battery power.  Pt also disconnected himself from tele and not connected to any stationary cords.  Pt stated, he was going to the restroom and lost his balance and caught himself on roommates bed and lowered himself to the ground\".  Cards II resident notified.  VS, neuro's, and skin had no issues.  Bed alarm activated.   "

## 2022-08-18 NOTE — PROGRESS NOTES
Calorie Count  Intake recorded for: 8/17  Total Kcals: 916 Total Protein: 54g  Kcals from Hospital Food: 916   Protein: 54g  Kcals from Outside Food (average):0 Protein: 0g  # Meals Ordered from Kitchen: 3  # Meals Recorded: 3 (First - 25% scrambled eggs w/ peppers, onions, and cheese)     (Second -100% hamburger rossy, side of gravy, mashed potatoes w/ gravy, pudding)     (Third - 100% chicken salad sandwich, Jello)  # Supplements Recorded: no intake recorded

## 2022-08-18 NOTE — PROGRESS NOTES
[facility]   Cards 2 - Progress Note    Dandy Sands MRN: [id number]  Age: 61 year old, : 1960  Date: 2022    Assessment and Plan:  Dandy Sands is a 61 year old male with PMH of lupus, antiphospholipid syndrome, HTN, HLD, HFrEF 2/2 ICM who presented with cardiogenic shock c/b multiorgan failure (JOSE, shock liver) requiring mechanical support with IABP, now s/p HM3 LVAD 22 by Dr. Zamora with intraoperative course c/b RV failure s/p 29F Protek duo via RIJ. Post op course c/b hemopericardium s/p repeat washout with chest left open. Chest closed 22 and decannulated from RVAD . Developed epistaxis on  night which was packed by ENT. Unfortunately bleeding persisted and there was concern for airway protection on  AM which prompted intubation and transfer to ICU. Patient has since been transferred to the floor and removed his own nasal packing in the evening .     Changes today:  - Continuing to hold bumex due to soft BP. Positive orthostatics, giving 750 ml fluids then rechecking orthos.    #HFrEF 2/2 ICM s/p HM3 LVAD in setting of cardiogenic shock (SCAI D)  #RV failure s/p Protek duo temporary RVAD s/p decannulation   #RV thrombus  #Post chest closure hemopericardium s/p repeat washout ()  #PMH CAD s/p PCI to LAD ()  #S/p CRT-D ()  Patient with ICM s/p HM3 LVAD 22 by Dr. Zamora in setting of presentation for cardiogenic shock requiring MCS with IABP as prior to HM (initially evaluated for heart transplant, however noted to have substantially elevated DSAs during course of care, so decision made to proceed with LVAD given patient already on temporary MCS). Intraoperative course c/b RV failure resulting in cardiogenic shock requiring high dose pressors necessitating RVAD support. Initial post-op course c/b hemopericardium requiring repeat washout with chest left open, however has since recovered well with decannulation on  with  extubation day prior. Has continued to tolerate well from hemodynamic and end organ standpoint.   - Volume status: euvolemic  - LVAD: low flow alarms flow 1.8-2.4. Restart hydral at 25 TID (from 37.5) and if BP can tolerate it will increase and add bumex  - continue captopril 6.25 mg tid  - continue digoxin for RV support  - stop low-intensity heparin drip + continue Warfarin (INR 2.34 today). Restart aspirin  - Of note, patient with climbing capture threshold of RV lead; will need to be evaluated in future by EP team    #Epistaxis  #Intubated due to Concern for airway protection (8/7)-Extubated (8/8)  #Mild-moderate emphysema  #History of COVID-19  #Iatrogenic R apical PTX  H/o nasal perforation that required elective procedure (in [place]) which was postponed due to LVAD insertion. Developed epistaxis not fixed by packing overnight on 8/6 and pt continued to bleed. Intubated due to concern for ability to maintain airway from bleeding. Controlled at bedside by ENT s/p cautery and packing. Appreciate assistance. Extubated 8/8. Cefepime/Vanc empirically for broad coverage for PNA (8/7-8/12). Patient removed his own nasal packing overnight on 8/9. ENT will discuss long-term plan regarding septal perforation with pt post extubation.  -Continue Breo-Ellipta  -Epistaxis management per ENT    #Insomnia/Delirium  Some concern that he had a fall overnight on 8/6. Discussed with nursing staff and he did NOT have a fall, although he did try to get out of bed. CT head was obtained (8/7) and it was unremarkable.  -Per psych recs:   - discontinue olanzapine   - Sertraline and Quetiapine per psych recs    #GERD  #Congestive hepatopathy in the setting of cardiac insuffiency - Resolved  #Hematemesis / oral mucosal bleeding -resolved    #Dysphagia  GI consulted 7/31 for black tarry stools and a possible GI bleed, however it is more likely swallowed blood from epistaxis that patient previously had. GI did not recommend an upper  endoscopy. Recurrence of bloody stools likely due to epistaxis which have resolved. Will monitor Hb.  -transition PPI IV to oral given there was no obvious GI bleeding  -cleared for regular diet by SLP    #E.faecalis on culture from PICC line (8/2/22) - 1/2 tubes in 1/4 sets collected 8/2  #S.epidermidis (MSSE) on culture from PICC line (8/2/22) - 1/2 tubes in 1/4 sets collected 8/2  Vanc was supposed to end on 8/7 but given massive epistaxis will start broad spectrum antibiotics  -Cefepime/Vanc empirically for broad coverage for PNA (8/7-8/12)    #Anemia due to blood loss from Epistaxis-Resolved  #History of DVT and PE   #Antiphospholipid antibody syndrome  #History of iron deficiency anemia  -monitor CBC qday; transfuse to keep Hb>7  -Continue low-intensity heparin gtt and hold ASA while monitoring for epistaxis    #SLE  - PTA meds: hydroxychloroquine 200mg, resumed 7/16  - Will need cellcept restarted when appropriate    #Oral thrush  Completed 14 days of nystatin   - discontinue nystatin today    Pertinent Lines  PIV    Feeds: PO diet  DVT Prophylaxis: heparin gtt, warfarin  GI Prophylaxis: PPI    Patient seen and discussed with Dr. Sahni.  Assessment and plan as above.    Cris Hernandes MD  Internal Medicine Resident (PGY-3)  [facility]      Subjective:  Nursing notes reviewed. NAEO. Patient slept well and has no concerns today. Denies CP or SOB.    Objective Findings:  Temp:  [97.1  F (36.2  C)-97.9  F (36.6  C)] 97.9  F (36.6  C)  Pulse:  [] 97  Resp:  [16] 16  BP: ()/(58-93) 91/80  SpO2:  [95 %-98 %] 97 %    Physical Exam:  GEN: Frail  HEENT: No epistaxis noted.  Pulm: both lungs were clear  Cardiac: no JVD, LVAD hum +  GI: soft, non distended  Neuro: alert  Vascular: No lower extremity edema    Medications:    sodium chloride 0.9%  250 mL Intravenous Once     sodium chloride 0.9%  500 mL Intravenous Once     acetaminophen  975 mg Oral or Feeding Tube Q8H CAMMY     aspirin  81 mg Oral  or Feeding Tube Daily     atorvastatin  40 mg Oral QPM     [Held by provider] bumetanide  1 mg Oral BID     captopril  12.5 mg Oral TID     digoxin  125 mcg Oral Daily     fluticasone-vilanterol  1 puff Inhalation Daily     heparin lock flush  5-20 mL Intracatheter Q24H     hydrALAZINE  25 mg Oral Q8H CAMMY     hydroxychloroquine  200 mg Oral BID     ketoconazole   Topical Daily     lidocaine  1 patch Transdermal Q24h    And     lidocaine   Transdermal Q8H CAMMY     melatonin  10 mg Oral At Bedtime     multivitamin w/minerals  1 tablet Oral Daily     pantoprazole  40 mg Oral QAM AC     QUEtiapine  150 mg Oral or Feeding Tube At Bedtime     QUEtiapine  25 mg Oral BID     saline nasal   Each Nare At Bedtime     sertraline  100 mg Oral Daily     sodium chloride  2 spray Both Nostrils Q4H     sodium chloride (PF)  10-40 mL Intracatheter Q8H     sodium chloride (PF)  10-40 mL Intracatheter Q8H     warfarin ANTICOAGULANT  7.5 mg Oral ONCE at 18:00     Warfarin Therapy Reminder  1 each Oral See Admin Instructions     [Held by provider] zolpidem  5 mg Oral At Bedtime       dextrose Stopped (08/09/22 1430)       Labs:   CMP  Recent Labs   Lab 08/18/22  0730 08/17/22  0539 08/16/22  1310 08/16/22  0350 08/15/22  2147 08/15/22  1659 08/15/22  0439 08/14/22  1549 08/14/22  0642 08/13/22  1132 08/13/22  0450 08/12/22  0602    136  --  136  --   --  138   < > 138  --  139 135*   POTASSIUM 4.0 3.9 3.7 3.0*  --   --  3.6   < > 3.7   < > 2.9* 4.0   CHLORIDE 100 98  --  96*  --   --  100   < > 103  --  103 107   CO2 27 28  --  29  --   --  25   < > 23  --  24 18*   ANIONGAP 10 10  --  11  --   --  13   < > 12  --  12 10   * 96  --  108*  --   --  99   < > 89  --  103* 103*   BUN 14.7 15.7  --  12.4  --   --  12.8   < > 12.3  --  12.2 11.7   CR 0.59* 0.63*  --  0.72  --   --  0.74   < > 0.70  --  0.69 0.60*   GFRESTIMATED >90 >90  --  >90  --   --  >90   < > >90  --  >90 >90   ELDA 9.4 9.1  --  9.1  --   --  9.0   < > 9.2  --   8.8 8.8   MAG 1.9  --   --  2.4* 2.9* 3.6* 1.5*  --  1.8  --  1.6* 2.0   PHOS  --   --   --   --   --   --   --   --  3.8  --  4.0 3.5   PROTTOTAL 7.2 7.0  --  7.3  --   --  7.1  --  6.7  --  6.4 6.3*   ALBUMIN 3.5 3.4*  --  3.5  --   --  3.3*  --  3.1*  --  3.0* 3.0*   BILITOTAL 0.5 0.5  --  0.6  --   --  0.6  --  0.6  --  0.6 0.6   ALKPHOS 152* 147*  --  148*  --   --  153*  --  141*  --  134* 127   AST 26 24  --  23  --   --  28  --  22  --  21 26   ALT 9* 9*  --  8*  --   --  10  --  8*  --  8* 12    < > = values in this interval not displayed.     CBC  Recent Labs   Lab 08/18/22  0714 08/17/22  1617 08/17/22  0539 08/16/22  1656 08/16/22  0350 08/15/22  1659 08/15/22  0819   WBC 7.1  --  6.3  --  8.0  --  9.8   RBC 3.59*  --  3.45*  --  3.58*  --  3.74*   HGB 10.4* 10.6* 10.2*  10.2* 10.1* 10.3*   < > 11.0*   HCT 33.9*  --  32.4*  --  32.9*  --  34.7*   MCV 94  --  94  --  92  --  93   MCH 29.0  --  29.6  --  28.8  --  29.4   MCHC 30.7*  --  31.5  --  31.3*  --  31.7   RDW 17.0*  --  17.1*  --  17.1*  --  17.4*     --  250  --  248  --  272    < > = values in this interval not displayed.     Arterial Blood Gas  No lab results found in last 7 days.     I have reviewed today's vital signs, notes, medications, labs and imaging.  I have also seen and examined the patient and agree with the findings and plan as outlined above.  Pt with HM3 on afterload reduction for increased MAPs now orthostatic BP readings.  Agree with holding diuretics and give IVF.  INR approaching goal.  Plan discussed with pt.     Darvin Sahni MD, PhD  Professor, Heart Failure and Cardiac Transplantation  [facility]

## 2022-08-18 NOTE — PLAN OF CARE
Pt admitted 6/17 with cardiogenic shock s/p IABP and HM III 7/8 c/b RV fail s/p RVAD and de cannulated.  Pace/SR, VS'S (ortho hypotension positive) on RA and denied pain.  Low flow alarm on HM III when getting from a lying to a sitting position while check ortho's and team notified.  Dizziness better, but still there.  750 ml NS bolus finishing up and othro's need to be recheck.  Drive line dressing change with LVAD teaching.  Discharge to rehab possible tomorrow.  Continue to monitor and with POC.

## 2022-08-19 ENCOUNTER — APPOINTMENT (OUTPATIENT)
Dept: CARDIOLOGY | Facility: CLINIC | Age: 62
DRG: 001 | End: 2022-08-19
Attending: STUDENT IN AN ORGANIZED HEALTH CARE EDUCATION/TRAINING PROGRAM
Payer: COMMERCIAL

## 2022-08-19 ENCOUNTER — DOCUMENTATION ONLY (OUTPATIENT)
Dept: ANTICOAGULATION | Facility: CLINIC | Age: 62
End: 2022-08-19

## 2022-08-19 DIAGNOSIS — Z95.811 LVAD (LEFT VENTRICULAR ASSIST DEVICE) PRESENT (H): ICD-10-CM

## 2022-08-19 DIAGNOSIS — Z95.811 LVAD (LEFT VENTRICULAR ASSIST DEVICE) PRESENT (H): Primary | ICD-10-CM

## 2022-08-19 DIAGNOSIS — I50.22 CHRONIC SYSTOLIC CONGESTIVE HEART FAILURE (H): Primary | ICD-10-CM

## 2022-08-19 LAB
ALBUMIN SERPL BCG-MCNC: 3.2 G/DL (ref 3.5–5.2)
ALP SERPL-CCNC: 146 U/L (ref 40–129)
ALT SERPL W P-5'-P-CCNC: 10 U/L (ref 10–50)
ANION GAP SERPL CALCULATED.3IONS-SCNC: 8 MMOL/L (ref 7–15)
AST SERPL W P-5'-P-CCNC: 23 U/L (ref 10–50)
ATRIAL RATE - MUSE: 96 BPM
BILIRUB SERPL-MCNC: 0.4 MG/DL
BUN SERPL-MCNC: 12.9 MG/DL (ref 8–23)
CA-I BLD-MCNC: 4.9 MG/DL (ref 4.4–5.2)
CALCIUM SERPL-MCNC: 9.1 MG/DL (ref 8.8–10.2)
CHLORIDE SERPL-SCNC: 104 MMOL/L (ref 98–107)
CREAT SERPL-MCNC: 0.55 MG/DL (ref 0.67–1.17)
DEPRECATED HCO3 PLAS-SCNC: 26 MMOL/L (ref 22–29)
DIASTOLIC BLOOD PRESSURE - MUSE: NORMAL MMHG
ERYTHROCYTE [DISTWIDTH] IN BLOOD BY AUTOMATED COUNT: 17 % (ref 10–15)
GFR SERPL CREATININE-BSD FRML MDRD: >90 ML/MIN/1.73M2
GLUCOSE SERPL-MCNC: 99 MG/DL (ref 70–99)
HCT VFR BLD AUTO: 32 % (ref 40–53)
HGB BLD-MCNC: 9.9 G/DL (ref 13.3–17.7)
HGB BLD-MCNC: 9.9 G/DL (ref 13.3–17.7)
INR PPP: 2.28 (ref 0.85–1.15)
INTERPRETATION ECG - MUSE: NORMAL
LACTATE SERPL-SCNC: 0.5 MMOL/L (ref 0.7–2)
LDH SERPL L TO P-CCNC: 277 U/L (ref 0–250)
LVEF ECHO: NORMAL
MCH RBC QN AUTO: 29 PG (ref 26.5–33)
MCHC RBC AUTO-ENTMCNC: 30.9 G/DL (ref 31.5–36.5)
MCV RBC AUTO: 94 FL (ref 78–100)
P AXIS - MUSE: NORMAL DEGREES
PLATELET # BLD AUTO: 242 10E3/UL (ref 150–450)
POTASSIUM SERPL-SCNC: 3.8 MMOL/L (ref 3.4–5.3)
PR INTERVAL - MUSE: NORMAL MS
PROT SERPL-MCNC: 6.7 G/DL (ref 6.4–8.3)
QRS DURATION - MUSE: 156 MS
QT - MUSE: 344 MS
QTC - MUSE: 434 MS
R AXIS - MUSE: -77 DEGREES
RBC # BLD AUTO: 3.41 10E6/UL (ref 4.4–5.9)
SODIUM SERPL-SCNC: 138 MMOL/L (ref 136–145)
SYSTOLIC BLOOD PRESSURE - MUSE: NORMAL MMHG
T AXIS - MUSE: 118 DEGREES
VENTRICULAR RATE- MUSE: 96 BPM
WBC # BLD AUTO: 5.9 10E3/UL (ref 4–11)

## 2022-08-19 PROCEDURE — 250N000011 HC RX IP 250 OP 636

## 2022-08-19 PROCEDURE — 83615 LACTATE (LD) (LDH) ENZYME: CPT

## 2022-08-19 PROCEDURE — 214N000001 HC R&B CCU UMMC

## 2022-08-19 PROCEDURE — 83735 ASSAY OF MAGNESIUM: CPT | Performed by: INTERNAL MEDICINE

## 2022-08-19 PROCEDURE — 85610 PROTHROMBIN TIME: CPT | Performed by: INTERNAL MEDICINE

## 2022-08-19 PROCEDURE — 250N000013 HC RX MED GY IP 250 OP 250 PS 637: Performed by: SURGERY

## 2022-08-19 PROCEDURE — 93306 TTE W/DOPPLER COMPLETE: CPT

## 2022-08-19 PROCEDURE — 250N000013 HC RX MED GY IP 250 OP 250 PS 637: Performed by: STUDENT IN AN ORGANIZED HEALTH CARE EDUCATION/TRAINING PROGRAM

## 2022-08-19 PROCEDURE — 80053 COMPREHEN METABOLIC PANEL: CPT | Performed by: INTERNAL MEDICINE

## 2022-08-19 PROCEDURE — 250N000013 HC RX MED GY IP 250 OP 250 PS 637: Performed by: INTERNAL MEDICINE

## 2022-08-19 PROCEDURE — 93750 INTERROGATION VAD IN PERSON: CPT | Performed by: INTERNAL MEDICINE

## 2022-08-19 PROCEDURE — 83605 ASSAY OF LACTIC ACID: CPT | Performed by: INTERNAL MEDICINE

## 2022-08-19 PROCEDURE — 82330 ASSAY OF CALCIUM: CPT | Performed by: INTERNAL MEDICINE

## 2022-08-19 PROCEDURE — 250N000013 HC RX MED GY IP 250 OP 250 PS 637: Performed by: PHYSICIAN ASSISTANT

## 2022-08-19 PROCEDURE — 85027 COMPLETE CBC AUTOMATED: CPT | Performed by: INTERNAL MEDICINE

## 2022-08-19 PROCEDURE — 93010 ELECTROCARDIOGRAM REPORT: CPT | Performed by: INTERNAL MEDICINE

## 2022-08-19 PROCEDURE — 93005 ELECTROCARDIOGRAM TRACING: CPT

## 2022-08-19 PROCEDURE — 99233 SBSQ HOSP IP/OBS HIGH 50: CPT | Mod: 25 | Performed by: INTERNAL MEDICINE

## 2022-08-19 PROCEDURE — 36592 COLLECT BLOOD FROM PICC: CPT | Performed by: INTERNAL MEDICINE

## 2022-08-19 PROCEDURE — 250N000013 HC RX MED GY IP 250 OP 250 PS 637

## 2022-08-19 PROCEDURE — 93306 TTE W/DOPPLER COMPLETE: CPT | Mod: 26 | Performed by: INTERNAL MEDICINE

## 2022-08-19 RX ORDER — WARFARIN SODIUM 3 MG/1
6 TABLET ORAL
Status: COMPLETED | OUTPATIENT
Start: 2022-08-19 | End: 2022-08-19

## 2022-08-19 RX ORDER — POTASSIUM CHLORIDE 750 MG/1
20 TABLET, EXTENDED RELEASE ORAL ONCE
Status: COMPLETED | OUTPATIENT
Start: 2022-08-19 | End: 2022-08-19

## 2022-08-19 RX ADMIN — SALINE NASAL SPRAY 2 SPRAY: 1.5 SOLUTION NASAL at 09:35

## 2022-08-19 RX ADMIN — SALINE NASAL SPRAY 2 SPRAY: 1.5 SOLUTION NASAL at 15:46

## 2022-08-19 RX ADMIN — ACETAMINOPHEN 975 MG: 325 TABLET, FILM COATED ORAL at 20:29

## 2022-08-19 RX ADMIN — HYDRALAZINE HYDROCHLORIDE 25 MG: 25 TABLET, FILM COATED ORAL at 21:24

## 2022-08-19 RX ADMIN — LIDOCAINE PATCH 4% 1 PATCH: 40 PATCH TOPICAL at 20:29

## 2022-08-19 RX ADMIN — SALINE NASAL SPRAY 2 SPRAY: 1.5 SOLUTION NASAL at 20:32

## 2022-08-19 RX ADMIN — POTASSIUM CHLORIDE 20 MEQ: 750 TABLET, EXTENDED RELEASE ORAL at 09:39

## 2022-08-19 RX ADMIN — SERTRALINE HYDROCHLORIDE 100 MG: 100 TABLET ORAL at 09:30

## 2022-08-19 RX ADMIN — PANTOPRAZOLE SODIUM 40 MG: 40 TABLET, DELAYED RELEASE ORAL at 09:31

## 2022-08-19 RX ADMIN — WARFARIN SODIUM 6 MG: 3 TABLET ORAL at 18:04

## 2022-08-19 RX ADMIN — CAPTOPRIL 12.5 MG: 12.5 TABLET ORAL at 20:30

## 2022-08-19 RX ADMIN — DIGOXIN 125 MCG: 125 TABLET ORAL at 09:31

## 2022-08-19 RX ADMIN — QUETIAPINE FUMARATE 150 MG: 50 TABLET ORAL at 21:23

## 2022-08-19 RX ADMIN — Medication 10 MG: at 21:24

## 2022-08-19 RX ADMIN — Medication 1 TABLET: at 09:31

## 2022-08-19 RX ADMIN — ASPIRIN 81 MG CHEWABLE TABLET 81 MG: 81 TABLET CHEWABLE at 09:31

## 2022-08-19 RX ADMIN — QUETIAPINE FUMARATE 25 MG: 25 TABLET ORAL at 15:43

## 2022-08-19 RX ADMIN — CAPTOPRIL 12.5 MG: 12.5 TABLET ORAL at 09:31

## 2022-08-19 RX ADMIN — HYDRALAZINE HYDROCHLORIDE 25 MG: 25 TABLET, FILM COATED ORAL at 15:43

## 2022-08-19 RX ADMIN — CAPTOPRIL 12.5 MG: 12.5 TABLET ORAL at 15:43

## 2022-08-19 RX ADMIN — SALINE NASAL SPRAY 2 SPRAY: 1.5 SOLUTION NASAL at 12:44

## 2022-08-19 RX ADMIN — ACETAMINOPHEN 975 MG: 325 TABLET, FILM COATED ORAL at 12:42

## 2022-08-19 RX ADMIN — OXYCODONE HYDROCHLORIDE 5 MG: 5 TABLET ORAL at 21:24

## 2022-08-19 RX ADMIN — HYDROXYCHLOROQUINE SULFATE 200 MG: 200 TABLET, FILM COATED ORAL at 09:31

## 2022-08-19 RX ADMIN — Medication 10 ML: at 12:43

## 2022-08-19 RX ADMIN — HYDROXYCHLOROQUINE SULFATE 200 MG: 200 TABLET, FILM COATED ORAL at 20:30

## 2022-08-19 RX ADMIN — QUETIAPINE FUMARATE 25 MG: 25 TABLET ORAL at 09:31

## 2022-08-19 RX ADMIN — ATORVASTATIN CALCIUM 40 MG: 40 TABLET, FILM COATED ORAL at 20:30

## 2022-08-19 ASSESSMENT — ACTIVITIES OF DAILY LIVING (ADL)
ADLS_ACUITY_SCORE: 38
ADLS_ACUITY_SCORE: 38
ADLS_ACUITY_SCORE: 37
ADLS_ACUITY_SCORE: 38
ADLS_ACUITY_SCORE: 37
ADLS_ACUITY_SCORE: 39
ADLS_ACUITY_SCORE: 37
ADLS_ACUITY_SCORE: 39
ADLS_ACUITY_SCORE: 37
ADLS_ACUITY_SCORE: 37

## 2022-08-19 NOTE — PROGRESS NOTES
[facility]   Cards 2 - Progress Note    Dandy Sands MRN: [id number]  Age: 61 year old, : 1960  Date: 2022    Assessment and Plan:  Dandy Sands is a 61 year old male with PMH of lupus, antiphospholipid syndrome, HTN, HLD, HFrEF 2/2 ICM who presented with cardiogenic shock c/b multiorgan failure (JOSE, shock liver) requiring mechanical support with IABP, now s/p HM3 LVAD 22 by Dr. Zamora with intraoperative course c/b RV failure s/p 29F Protek duo via RIJ. Post op course c/b hemopericardium s/p repeat washout with chest left open. Chest closed 22 and decannulated from RVAD . Developed epistaxis on  night which was packed by ENT. Unfortunately bleeding persisted and there was concern for airway protection on  AM which prompted intubation and transfer to ICU. Patient has since been transferred to the floor and removed his own nasal packing in the evening .     Changes today:  - LVAD interrogation showed no alarms in past 24 hr; last low flow alarm was  AM (flow 2.1, Sp 5300, power 3.6, PI 7.1)  - TTE today showed underfilling LV which might explain low flow alarm due to suckdown event  - decrease speed 5300->5200  - continue hydral at 25 TID, captopril 6.25 mg tid    #HFrEF 2/2 ICM s/p HM3 LVAD in setting of cardiogenic shock (SCAI D)  #RV failure s/p Protek duo temporary RVAD s/p decannulation   #RV thrombus  #Post chest closure hemopericardium s/p repeat washout ()  #PMH CAD s/p PCI to LAD ()  #S/p CRT-D ()  Patient with ICM s/p HM3 LVAD 22 by Dr. Zamora in setting of presentation for cardiogenic shock requiring MCS with IABP as prior to HM (initially evaluated for heart transplant, however noted to have substantially elevated DSAs during course of care, so decision made to proceed with LVAD given patient already on temporary MCS). Intraoperative course c/b RV failure resulting in cardiogenic shock requiring high dose  pressors necessitating RVAD support. Initial post-op course c/b hemopericardium requiring repeat washout with chest left open, however has since recovered well with decannulation on 7/21 with extubation day prior. Has continued to tolerate well from hemodynamic and end organ standpoint. He has had low flow alarm since 8/16 with high PI ~7. MAP at that time were around 40-50s.  - Volume status: euvolemic  - LVAD:   - interrogation showed no alarms in past 24 hr; last low flow alarm was 8/18 AM (flow 2.1, Sp 5300, power 3.6, PI 7.1)   - TTE today showed underfilling LV which might explain low flow alarm due to suckdown event   - decrease speed 5300->5200  - continue hydral at 25 TID   - continue captopril 6.25 mg tid  - continue digoxin for RV support  - pharmacy to dose warfarin   - continue ASA 81 mg qday  - Of note, patient with climbing capture threshold of RV lead; will need to be evaluated in future by EP team    #Epistaxis  #Intubated due to Concern for airway protection (8/7)-Extubated (8/8)  #Mild-moderate emphysema  #History of COVID-19  #Iatrogenic R apical PTX  H/o nasal perforation that required elective procedure (in [place]) which was postponed due to LVAD insertion. Developed epistaxis not fixed by packing overnight on 8/6 and pt continued to bleed. Intubated due to concern for ability to maintain airway from bleeding. Controlled at bedside by ENT s/p cautery and packing. Appreciate assistance. Extubated 8/8. Cefepime/Vanc empirically for broad coverage for PNA (8/7-8/12). Patient removed his own nasal packing overnight on 8/9. ENT will discuss long-term plan regarding septal perforation with pt post extubation.  -Continue Breo-Ellipta  -Epistaxis management per ENT    #Insomnia/Delirium  Some concern that he had a fall overnight on 8/6. Discussed with nursing staff and he did NOT have a fall, although he did try to get out of bed. CT head was obtained (8/7) and it was unremarkable.  -Per psych  recs:   - discontinue olanzapine   - Sertraline and Quetiapine per psych recs    #GERD  #Congestive hepatopathy in the setting of cardiac insuffiency - Resolved  #Hematemesis / oral mucosal bleeding -resolved    #Dysphagia  GI consulted 7/31 for black tarry stools and a possible GI bleed, however it is more likely swallowed blood from epistaxis that patient previously had. GI did not recommend an upper endoscopy. Recurrence of bloody stools likely due to epistaxis which have resolved. Will monitor Hb.  -transition PPI IV to oral given there was no obvious GI bleeding  -cleared for regular diet by SLP    #E.faecalis on culture from PICC line (8/2/22) - 1/2 tubes in 1/4 sets collected 8/2  #S.epidermidis (MSSE) on culture from PICC line (8/2/22) - 1/2 tubes in 1/4 sets collected 8/2  Vanc was supposed to end on 8/7 but given massive epistaxis will start broad spectrum antibiotics  -Cefepime/Vanc empirically for broad coverage for PNA (8/7-8/12)    #Anemia due to blood loss from Epistaxis-Resolved  #History of DVT and PE   #Antiphospholipid antibody syndrome  #History of iron deficiency anemia  -monitor CBC qday; transfuse to keep Hb>7  -Continue low-intensity heparin gtt and hold ASA while monitoring for epistaxis    #SLE  - PTA meds: hydroxychloroquine 200mg, resumed 7/16  - Will need cellcept restarted when appropriate    #Oral thrush  Completed 14 days of nystatin     Pertinent Lines  PIV    Feeds: PO diet  DVT Prophylaxis: heparin gtt, warfarin  GI Prophylaxis: PPI    Patient seen and discussed with Dr. Stewart.  Assessment and plan as above.    Nelsy Dewitt  PGY-1 Internal Medicine  Cards 2 service    Subjective:  Nursing notes reviewed. Still has dizziness when sit up and stand up. Denies chest pain, palpitations, sob.    Objective Findings:  Temp:  [97.6  F (36.4  C)-97.9  F (36.6  C)] 97.8  F (36.6  C)  Pulse:  [] 96  Resp:  [16-18] 17  BP: ()/(53-93) 89/74  SpO2:  [94 %-99 %] 98  %    Physical Exam:  GEN: Frail  HEENT: No epistaxis noted.  Pulm: both lungs were clear  Cardiac: no JVD, LVAD hum +  GI: soft, non distended  Neuro: alert  Vascular: No lower extremity edema    Medications:    acetaminophen  975 mg Oral or Feeding Tube Q8H CAMMY     aspirin  81 mg Oral or Feeding Tube Daily     atorvastatin  40 mg Oral QPM     [Held by provider] bumetanide  1 mg Oral BID     captopril  12.5 mg Oral TID     digoxin  125 mcg Oral Daily     fluticasone-vilanterol  1 puff Inhalation Daily     heparin lock flush  5-20 mL Intracatheter Q24H     hydrALAZINE  25 mg Oral Q8H CAMMY     hydroxychloroquine  200 mg Oral BID     ketoconazole   Topical Daily     lidocaine  1 patch Transdermal Q24h    And     lidocaine   Transdermal Q8H CAMMY     melatonin  10 mg Oral At Bedtime     multivitamin w/minerals  1 tablet Oral Daily     pantoprazole  40 mg Oral QAM AC     QUEtiapine  150 mg Oral or Feeding Tube At Bedtime     QUEtiapine  25 mg Oral BID     saline nasal   Each Nare At Bedtime     sertraline  100 mg Oral Daily     sodium chloride  2 spray Both Nostrils Q4H     sodium chloride (PF)  10-40 mL Intracatheter Q8H     sodium chloride (PF)  10-40 mL Intracatheter Q8H     warfarin ANTICOAGULANT  6 mg Oral ONCE at 18:00     Warfarin Therapy Reminder  1 each Oral See Admin Instructions     [Held by provider] zolpidem  5 mg Oral At Bedtime       dextrose Stopped (08/09/22 1430)       Labs:   CMP  Recent Labs   Lab 08/19/22  0642 08/18/22  0730 08/17/22  0539 08/16/22  1310 08/16/22  0350 08/15/22  2147 08/15/22  1659 08/14/22  1549 08/14/22  0642 08/13/22  1132 08/13/22  0450    137 136  --  136  --   --    < > 138  --  139   POTASSIUM 3.8 4.0 3.9 3.7 3.0*  --   --    < > 3.7   < > 2.9*   CHLORIDE 104 100 98  --  96*  --   --    < > 103  --  103   CO2 26 27 28  --  29  --   --    < > 23  --  24   ANIONGAP 8 10 10  --  11  --   --    < > 12  --  12   GLC 99 100* 96  --  108*  --   --    < > 89  --  103*   BUN 12.9  14.7 15.7  --  12.4  --   --    < > 12.3  --  12.2   CR 0.55* 0.59* 0.63*  --  0.72  --   --    < > 0.70  --  0.69   GFRESTIMATED >90 >90 >90  --  >90  --   --    < > >90  --  >90   ELDA 9.1 9.4 9.1  --  9.1  --   --    < > 9.2  --  8.8   MAG  --  1.9  --   --  2.4* 2.9* 3.6*   < > 1.8  --  1.6*   PHOS  --   --   --   --   --   --   --   --  3.8  --  4.0   PROTTOTAL 6.7 7.2 7.0  --  7.3  --   --    < > 6.7  --  6.4   ALBUMIN 3.2* 3.5 3.4*  --  3.5  --   --    < > 3.1*  --  3.0*   BILITOTAL 0.4 0.5 0.5  --  0.6  --   --    < > 0.6  --  0.6   ALKPHOS 146* 152* 147*  --  148*  --   --    < > 141*  --  134*   AST 23 26 24  --  23  --   --    < > 22  --  21   ALT 10 9* 9*  --  8*  --   --    < > 8*  --  8*    < > = values in this interval not displayed.     CBC  Recent Labs   Lab 08/19/22  0642 08/18/22  1543 08/18/22  0714 08/17/22  1617 08/17/22  0539 08/16/22  1656 08/16/22  0350   WBC 5.9  --  7.1  --  6.3  --  8.0   RBC 3.41*  --  3.59*  --  3.45*  --  3.58*   HGB 9.9*  9.9* 9.9* 10.4* 10.6* 10.2*  10.2*   < > 10.3*   HCT 32.0*  --  33.9*  --  32.4*  --  32.9*   MCV 94  --  94  --  94  --  92   MCH 29.0  --  29.0  --  29.6  --  28.8   MCHC 30.9*  --  30.7*  --  31.5  --  31.3*   RDW 17.0*  --  17.0*  --  17.1*  --  17.1*     --  255  --  250  --  248    < > = values in this interval not displayed.     Arterial Blood Gas  No lab results found in last 7 days.

## 2022-08-19 NOTE — PROGRESS NOTES
Patient Name:  Dandy Sands     Anticipated Discharge Date:  8/20/2022    Discharge Disposition:   ARU:  [facility] 5th floor [phone]    Transportation Needs: Ambulance ride d/t not having completed VAD education yet. DOES NOT need [employer] for LVAD equipment as ARU has VAD carts on their unit already.    Name of Transportation Company and Phone: [employer] [phone]-Ride tentatively set for Saturday, 8/20 at 11am     Additional Services/Equipment Arranged:  Family in [place] apartment as required by VAD program so they can provide caregiver support at discharge     Persons notified of above discharge plan: Bedside RN, VAD coordinator, Patient and Son/son's Concepción    Patient / Family response to discharge plan:  Agreeable to discharge to rehab when insurance gives approval and bed available. Aware that if he is not medically ready tomorrow, discharge will be placed on hold.     Education provided by SW at discharge: role of VAD  in out patient setting and provided contact info for . Also reminded them of VAD support group on Tuesdays      Patient and family discharge goal: Dandy reports that he is anxious to get back to [place]. Will to go to rehab and finish VAD education so he can get to the local apartment.  Provided Education on discharge plan: YES  Patient agreeable to discharge plan:  YES  A list of Medicare Certified Facilities was provided to the patient and/or family to encourage patient choice. Patient's choices for facility are: NO-Only inpatient rehab able to take a new VAD with need to complete VAD education is [facility]  Will NH provide Skilled rehabilitation or complex medical:  YES  General information regarding anticipated insurance coverage and possible out of pocket cost was discussed. Patient and patient's family are aware patient may incur the cost of transportation to the facility, pending insurance payment:  YES  Barriers to discharge: Insurance Authorization-Received approval 2:30pm on Friday, 8/19    CTS Handoff completed:  YES-external    Medicare Notice of Rights provided to the patient/family:  NO-Patient does not have Medicare.    Addendum:  Talked with Cards II [facility] [id number] about patient readiness for discharge and discharge plan. Patient having low-flow alarms this morning. Want to watch today and tomorrow morning. If no more alarms, can discharge tomorrow at 11am. Will leave patient on W/E list to check on readiness for discharge tomorrow.

## 2022-08-19 NOTE — PLAN OF CARE
D/I/A: A+Ox4. Making needs known.  VSS, afebrile.  Pleasant and cooperative.  Medicated for pain effectively with current med orders.  LVAD operating within normal limits and free from alarm.    P: Continue to monitor status.

## 2022-08-19 NOTE — PROGRESS NOTES
"ANTICOAGULATION  MANAGEMENT: NEW REFERRAL      SUBJECTIVE/OBJECTIVE     Dandy Sands, a 61 year old male  is newly referred to [facility].    Anticoagulation:    Previously on warfarin: No hx of warfarin use found in chart review  Warfarin initiation date (approximate): Restarted Warfarin while in-patient on 7/24   Indication(s): LVAD   Goal Range: 2.0-3.0   Anticoagulation Bridge/Overlap No   Referring provider: Dr. Kelly Lora    General Dietary/Social Hx:    Typical vitamin K intake: Unable to assess per currently in the hospital     Other dietary considerations: None and New VAD placed on 7/8/22 and currently in the hospital so unable to assess      Social History:      In the past 2 weeks, patient estimates taking medications as instructed % of time: Non applicable patient is currently in the hospital    Results:        Recent labs: (last 7 days)     08/19/22  0642   INR 2.28*       Wt Readings from Last 2 Encounters:   08/19/22 60.1 kg (132 lb 8 oz)   07/08/22 62.6 kg (138 lb)      Estimated body mass index is 20.75 kg/m  as calculated from the following:    Height as of 6/17/22: 1.702 m (5' 7\").    Weight as of an earlier encounter on 8/19/22: 60.1 kg (132 lb 8 oz).  Lab Results   Component Value Date    AST 23 08/19/2022     Lab Results   Component Value Date    CR 0.55 (L) 08/19/2022     Estimated Creatinine Clearance: 121.4 mL/min (A) (based on SCr of 0.55 mg/dL (L)).    ASSESSMENT       Goal INR 2-3, standard for indication(s) above    On warfarin > 30 days; maintenance dose has been established        PLAN     Dosing Instructions: Currently in-patient will follow-up with established maintenance dose once discharged with INR in 4 weeks           Education provided: None required    Education still needed: When discharge will need an introduction to the [facility]. Previously followed by [facility] in [place], [place]    Did not contact " patient and will follow-up once discharged.    Orders given to  Homecare nurse/facility to recheck    Standing orders placed in Epic: Point of Care INR (Lab 5000) and Venous INR  (Lab 3572)    Plan made per [facility] anticoagulation protocol and per LVAD protocol    Riri Giron RN  Anticoagulation Clinic  8/19/2022

## 2022-08-19 NOTE — PROGRESS NOTES
VAD Driveline exit site dressing change modification recommendations: Pt has redness/irritation under adhesive from driveline dressing and rash at lateral edge of dressing. Please stop using adhesive in kit and instead use 3M Medipore tape to secure borders. Place gauze over chest tube sites that are under tape. Also cover any irritated/rashed skin with gauze and avoid placing tape directly over open or irritated skin. Stop using skin prep.

## 2022-08-19 NOTE — PROGRESS NOTES
Calorie Count  Intake recorded for: 8/18  Total Kcals: 624 Total Protein: 10g  Kcals from Hospital Food: 624   Protein: 10g  Kcals from Outside Food (average):0 Protein: 0g  # Meals Ordered from Kitchen: 2  # Meals Recorded: 2 (first - 100% blueberry muffin. 25% grapes)  (second - 100% potato chips, two chocolate ice cream)  # Supplements Recorded: 0

## 2022-08-19 NOTE — PLAN OF CARE
I/A: A/Ox4. VSS on RA. SR/ST on tele. CASEY. Hydralazine held for MAP of 66. HM3 #'s WNL except for intermittent flows in 2's and PI's in 7s, no alarms noted, dressing CDI. Back pain managed with uriel tylenol. Sternal incision and old CT sites JOSE ENRIQUE. Tolerating cardiac diet w/ 2L FR. Adequate UOP. LBM 8/18. A1, GB+walker. Appeared to sleep comfortably.     P: LVAD edu today. Encourage oral intake and activity. Awaiting ARU.     Hours of care: [id number]

## 2022-08-19 NOTE — PROGRESS NOTES
CLINICAL NUTRITION SERVICES    LVAD nutrition education and discuss oral supplement preferences    Diet: Cardiac. Ordered to receive vanilla Ensure Enlive at 14:00 and apple Ensure Clear at HS.   Fluid Restriction: 2 L/day.   Intake: Kcal counts 8/17-8/18 averaged 770 kcals and 32 g protein. Inadequate nutrition intake. No kcal count data in EHR on 8/16.    INTERVENTIONS:  Implementation (pt and family):  Nutrition education for nutrition relationship to health/disease: Provided instruction on the need to eat small, frequent meals. Importance of adequate oral intake post-op, especially for 6-8 weeks, was discussed. Provided handouts) regarding the amounts of protein in various food items. Wrote protein goal on his handout.   Medical food supplement therapy: Discussed oral supplements. Ordered Chocolate Ensure Enlive at breakfast and chocolate Ensure Enlive Shake at supper as per pt preference.  Collaboration with other providers: Paged provider to consider liberalizing diet order to a 3 g sodium diet, if able.     Follow up/Monitoring:  Will continue to follow pt.    Bridget Wade, MS, RD, LD, [facility]   6C Pgr: [phone]

## 2022-08-20 ENCOUNTER — APPOINTMENT (OUTPATIENT)
Dept: CARDIOLOGY | Facility: CLINIC | Age: 62
DRG: 001 | End: 2022-08-20
Attending: STUDENT IN AN ORGANIZED HEALTH CARE EDUCATION/TRAINING PROGRAM
Payer: COMMERCIAL

## 2022-08-20 ENCOUNTER — APPOINTMENT (OUTPATIENT)
Dept: CT IMAGING | Facility: CLINIC | Age: 62
DRG: 001 | End: 2022-08-20
Attending: STUDENT IN AN ORGANIZED HEALTH CARE EDUCATION/TRAINING PROGRAM
Payer: COMMERCIAL

## 2022-08-20 LAB
ALBUMIN SERPL BCG-MCNC: 3.4 G/DL (ref 3.5–5.2)
ALP SERPL-CCNC: 142 U/L (ref 40–129)
ALT SERPL W P-5'-P-CCNC: 12 U/L (ref 10–50)
ANION GAP SERPL CALCULATED.3IONS-SCNC: 9 MMOL/L (ref 7–15)
AST SERPL W P-5'-P-CCNC: 25 U/L (ref 10–50)
BILIRUB SERPL-MCNC: 0.4 MG/DL
BUN SERPL-MCNC: 11.4 MG/DL (ref 8–23)
CA-I BLD-MCNC: 5 MG/DL (ref 4.4–5.2)
CALCIUM SERPL-MCNC: 9.4 MG/DL (ref 8.8–10.2)
CHLORIDE SERPL-SCNC: 106 MMOL/L (ref 98–107)
CREAT SERPL-MCNC: 0.6 MG/DL (ref 0.67–1.17)
DEPRECATED HCO3 PLAS-SCNC: 24 MMOL/L (ref 22–29)
ERYTHROCYTE [DISTWIDTH] IN BLOOD BY AUTOMATED COUNT: 17.1 % (ref 10–15)
GFR SERPL CREATININE-BSD FRML MDRD: >90 ML/MIN/1.73M2
GLUCOSE SERPL-MCNC: 88 MG/DL (ref 70–99)
HCT VFR BLD AUTO: 32.8 % (ref 40–53)
HGB BLD-MCNC: 9.8 G/DL (ref 13.3–17.7)
INR PPP: 2.59 (ref 0.85–1.15)
LACTATE SERPL-SCNC: 0.8 MMOL/L (ref 0.7–2)
LDH SERPL L TO P-CCNC: 278 U/L (ref 0–250)
LVEF ECHO: NORMAL
MAGNESIUM SERPL-MCNC: 1.6 MG/DL (ref 1.7–2.3)
MCH RBC QN AUTO: 28.5 PG (ref 26.5–33)
MCHC RBC AUTO-ENTMCNC: 29.9 G/DL (ref 31.5–36.5)
MCV RBC AUTO: 95 FL (ref 78–100)
PHOSPHATE SERPL-MCNC: 4.4 MG/DL (ref 2.5–4.5)
PLATELET # BLD AUTO: 262 10E3/UL (ref 150–450)
POTASSIUM SERPL-SCNC: 4.4 MMOL/L (ref 3.4–5.3)
PROT SERPL-MCNC: 7 G/DL (ref 6.4–8.3)
RBC # BLD AUTO: 3.44 10E6/UL (ref 4.4–5.9)
SARS-COV-2 RNA RESP QL NAA+PROBE: NEGATIVE
SODIUM SERPL-SCNC: 139 MMOL/L (ref 136–145)
WBC # BLD AUTO: 6.1 10E3/UL (ref 4–11)

## 2022-08-20 PROCEDURE — 71275 CT ANGIOGRAPHY CHEST: CPT

## 2022-08-20 PROCEDURE — 250N000013 HC RX MED GY IP 250 OP 250 PS 637: Performed by: INTERNAL MEDICINE

## 2022-08-20 PROCEDURE — 250N000011 HC RX IP 250 OP 636: Performed by: INTERNAL MEDICINE

## 2022-08-20 PROCEDURE — 250N000013 HC RX MED GY IP 250 OP 250 PS 637: Performed by: STUDENT IN AN ORGANIZED HEALTH CARE EDUCATION/TRAINING PROGRAM

## 2022-08-20 PROCEDURE — 83615 LACTATE (LD) (LDH) ENZYME: CPT

## 2022-08-20 PROCEDURE — 83605 ASSAY OF LACTIC ACID: CPT | Performed by: INTERNAL MEDICINE

## 2022-08-20 PROCEDURE — 99233 SBSQ HOSP IP/OBS HIGH 50: CPT | Mod: 25 | Performed by: INTERNAL MEDICINE

## 2022-08-20 PROCEDURE — 83735 ASSAY OF MAGNESIUM: CPT | Performed by: THORACIC SURGERY (CARDIOTHORACIC VASCULAR SURGERY)

## 2022-08-20 PROCEDURE — 85027 COMPLETE CBC AUTOMATED: CPT

## 2022-08-20 PROCEDURE — 36592 COLLECT BLOOD FROM PICC: CPT | Performed by: INTERNAL MEDICINE

## 2022-08-20 PROCEDURE — U0003 INFECTIOUS AGENT DETECTION BY NUCLEIC ACID (DNA OR RNA); SEVERE ACUTE RESPIRATORY SYNDROME CORONAVIRUS 2 (SARS-COV-2) (CORONAVIRUS DISEASE [COVID-19]), AMPLIFIED PROBE TECHNIQUE, MAKING USE OF HIGH THROUGHPUT TECHNOLOGIES AS DESCRIBED BY CMS-2020-01-R: HCPCS | Performed by: STUDENT IN AN ORGANIZED HEALTH CARE EDUCATION/TRAINING PROGRAM

## 2022-08-20 PROCEDURE — 71275 CT ANGIOGRAPHY CHEST: CPT | Mod: 26 | Performed by: RADIOLOGY

## 2022-08-20 PROCEDURE — 250N000011 HC RX IP 250 OP 636

## 2022-08-20 PROCEDURE — 93325 DOPPLER ECHO COLOR FLOW MAPG: CPT

## 2022-08-20 PROCEDURE — 93325 DOPPLER ECHO COLOR FLOW MAPG: CPT | Mod: 26 | Performed by: STUDENT IN AN ORGANIZED HEALTH CARE EDUCATION/TRAINING PROGRAM

## 2022-08-20 PROCEDURE — 93321 DOPPLER ECHO F-UP/LMTD STD: CPT | Mod: 26 | Performed by: STUDENT IN AN ORGANIZED HEALTH CARE EDUCATION/TRAINING PROGRAM

## 2022-08-20 PROCEDURE — 82330 ASSAY OF CALCIUM: CPT | Performed by: INTERNAL MEDICINE

## 2022-08-20 PROCEDURE — 80053 COMPREHEN METABOLIC PANEL: CPT | Performed by: INTERNAL MEDICINE

## 2022-08-20 PROCEDURE — 93308 TTE F-UP OR LMTD: CPT

## 2022-08-20 PROCEDURE — 214N000001 HC R&B CCU UMMC

## 2022-08-20 PROCEDURE — 250N000013 HC RX MED GY IP 250 OP 250 PS 637: Performed by: THORACIC SURGERY (CARDIOTHORACIC VASCULAR SURGERY)

## 2022-08-20 PROCEDURE — 250N000013 HC RX MED GY IP 250 OP 250 PS 637

## 2022-08-20 PROCEDURE — 93750 INTERROGATION VAD IN PERSON: CPT | Performed by: INTERNAL MEDICINE

## 2022-08-20 PROCEDURE — 85610 PROTHROMBIN TIME: CPT | Performed by: INTERNAL MEDICINE

## 2022-08-20 PROCEDURE — 250N000013 HC RX MED GY IP 250 OP 250 PS 637: Performed by: PHYSICIAN ASSISTANT

## 2022-08-20 PROCEDURE — 84100 ASSAY OF PHOSPHORUS: CPT | Performed by: THORACIC SURGERY (CARDIOTHORACIC VASCULAR SURGERY)

## 2022-08-20 PROCEDURE — 250N000013 HC RX MED GY IP 250 OP 250 PS 637: Performed by: SURGERY

## 2022-08-20 PROCEDURE — 93308 TTE F-UP OR LMTD: CPT | Mod: 26 | Performed by: STUDENT IN AN ORGANIZED HEALTH CARE EDUCATION/TRAINING PROGRAM

## 2022-08-20 RX ORDER — ZOLPIDEM TARTRATE 5 MG/1
5 TABLET ORAL
Status: DISCONTINUED | OUTPATIENT
Start: 2022-08-20 | End: 2022-08-21 | Stop reason: HOSPADM

## 2022-08-20 RX ORDER — KETOCONAZOLE 20 MG/G
CREAM TOPICAL DAILY
Qty: 30 G | Refills: 0 | Status: CANCELLED | OUTPATIENT
Start: 2022-08-20

## 2022-08-20 RX ORDER — MAGNESIUM OXIDE 400 MG/1
400 TABLET ORAL EVERY 4 HOURS
Status: COMPLETED | OUTPATIENT
Start: 2022-08-20 | End: 2022-08-20

## 2022-08-20 RX ORDER — LISINOPRIL 2.5 MG/1
2.5 TABLET ORAL DAILY
Status: DISCONTINUED | OUTPATIENT
Start: 2022-08-20 | End: 2022-08-21

## 2022-08-20 RX ORDER — MAGNESIUM OXIDE 400 MG/1
400 TABLET ORAL EVERY 4 HOURS
Refills: 0 | Status: CANCELLED
Start: 2022-08-20

## 2022-08-20 RX ORDER — IOPAMIDOL 755 MG/ML
100 INJECTION, SOLUTION INTRAVASCULAR ONCE
Status: COMPLETED | OUTPATIENT
Start: 2022-08-20 | End: 2022-08-20

## 2022-08-20 RX ORDER — WARFARIN SODIUM 5 MG/1
5 TABLET ORAL
Status: COMPLETED | OUTPATIENT
Start: 2022-08-20 | End: 2022-08-20

## 2022-08-20 RX ADMIN — DIGOXIN 125 MCG: 125 TABLET ORAL at 08:13

## 2022-08-20 RX ADMIN — Medication 400 MG: at 11:44

## 2022-08-20 RX ADMIN — Medication 10 MG: at 21:08

## 2022-08-20 RX ADMIN — SALINE NASAL SPRAY 2 SPRAY: 1.5 SOLUTION NASAL at 08:18

## 2022-08-20 RX ADMIN — Medication 37.5 MG: at 14:30

## 2022-08-20 RX ADMIN — ATORVASTATIN CALCIUM 40 MG: 40 TABLET, FILM COATED ORAL at 20:44

## 2022-08-20 RX ADMIN — WARFARIN SODIUM 5 MG: 5 TABLET ORAL at 18:42

## 2022-08-20 RX ADMIN — HYDROXYCHLOROQUINE SULFATE 200 MG: 200 TABLET, FILM COATED ORAL at 08:13

## 2022-08-20 RX ADMIN — SALINE NASAL SPRAY 2 SPRAY: 1.5 SOLUTION NASAL at 16:03

## 2022-08-20 RX ADMIN — Medication 1 TABLET: at 08:13

## 2022-08-20 RX ADMIN — SERTRALINE HYDROCHLORIDE 100 MG: 100 TABLET ORAL at 08:13

## 2022-08-20 RX ADMIN — LISINOPRIL 2.5 MG: 2.5 TABLET ORAL at 16:02

## 2022-08-20 RX ADMIN — CAPTOPRIL 12.5 MG: 12.5 TABLET ORAL at 08:13

## 2022-08-20 RX ADMIN — ACETAMINOPHEN 975 MG: 325 TABLET, FILM COATED ORAL at 11:44

## 2022-08-20 RX ADMIN — QUETIAPINE FUMARATE 25 MG: 25 TABLET ORAL at 16:03

## 2022-08-20 RX ADMIN — SALINE NASAL SPRAY 2 SPRAY: 1.5 SOLUTION NASAL at 11:45

## 2022-08-20 RX ADMIN — IOPAMIDOL 100 ML: 755 INJECTION, SOLUTION INTRAVENOUS at 14:11

## 2022-08-20 RX ADMIN — PANTOPRAZOLE SODIUM 40 MG: 40 TABLET, DELAYED RELEASE ORAL at 08:14

## 2022-08-20 RX ADMIN — Medication 10 ML: at 11:45

## 2022-08-20 RX ADMIN — OXYCODONE HYDROCHLORIDE 5 MG: 5 TABLET ORAL at 21:08

## 2022-08-20 RX ADMIN — SALINE NASAL SPRAY 2 SPRAY: 1.5 SOLUTION NASAL at 04:00

## 2022-08-20 RX ADMIN — ACETAMINOPHEN 975 MG: 325 TABLET, FILM COATED ORAL at 05:31

## 2022-08-20 RX ADMIN — SALINE NASAL SPRAY 2 SPRAY: 1.5 SOLUTION NASAL at 20:49

## 2022-08-20 RX ADMIN — ACETAMINOPHEN 975 MG: 325 TABLET, FILM COATED ORAL at 20:43

## 2022-08-20 RX ADMIN — HYDROXYCHLOROQUINE SULFATE 200 MG: 200 TABLET, FILM COATED ORAL at 20:44

## 2022-08-20 RX ADMIN — QUETIAPINE FUMARATE 150 MG: 50 TABLET ORAL at 21:08

## 2022-08-20 RX ADMIN — QUETIAPINE FUMARATE 25 MG: 25 TABLET ORAL at 08:14

## 2022-08-20 RX ADMIN — ASPIRIN 81 MG CHEWABLE TABLET 81 MG: 81 TABLET CHEWABLE at 08:13

## 2022-08-20 RX ADMIN — HYDRALAZINE HYDROCHLORIDE 25 MG: 25 TABLET, FILM COATED ORAL at 05:31

## 2022-08-20 RX ADMIN — Medication 37.5 MG: at 21:08

## 2022-08-20 RX ADMIN — Medication 400 MG: at 08:13

## 2022-08-20 ASSESSMENT — ACTIVITIES OF DAILY LIVING (ADL)
ADLS_ACUITY_SCORE: 37

## 2022-08-20 NOTE — PROGRESS NOTES
[facility]   Cards 2 - Progress Note    Dandy Sands MRN: [id number]  Age: 61 year old, : 1960  Date: 2022    Assessment and Plan:  Dandy Sands is a 61 year old male with PMH of lupus, antiphospholipid syndrome, HTN, HLD, HFrEF 2/2 ICM who presented with cardiogenic shock c/b multiorgan failure (JOSE, shock liver) requiring mechanical support with IABP, now s/p HM3 LVAD 22 by Dr. Zamora with intraoperative course c/b RV failure s/p 29F Protek duo via RIJ. Post op course c/b hemopericardium s/p repeat washout with chest left open. Chest closed 22 and decannulated from RVAD . Developed epistaxis on  night which was packed by ENT. Unfortunately bleeding persisted and there was concern for airway protection on  AM which prompted intubation and transfer to ICU. Patient has since been transferred to the floor and removed his own nasal packing in the evening . Has not had any further issues with epistaxis and is currently medically stable for ARU pending bed availability.      Changes today:  - LVAD alarm @ 2345 for low flow alarm w/ no power spike > CTA chest done which showed patent LVAD outflow graft  - TTE today showed underfilling LV which might explain prior low flow alarm due to suckdown event  - Speed decreased from 5300 > 5200 yesterday  - increase hydral to 37.5mg TID; transition Captopril to Lisinopril 2.5mg daily    #HFrEF 2/2 ICM s/p HM3 LVAD in setting of cardiogenic shock (SCAI D)  #RV failure s/p Protek duo temporary RVAD s/p decannulation   #RV thrombus  #Post chest closure hemopericardium s/p repeat washout ()  #PMH CAD s/p PCI to LAD ()  #S/p CRT-D ()  Patient with ICM s/p HM3 LVAD 22 by Dr. Zamora in setting of presentation for cardiogenic shock requiring MCS with IABP as prior to HM (initially evaluated for heart transplant, however noted to have substantially elevated DSAs during course of care, so  decision made to proceed with LVAD given patient already on temporary MCS). Intraoperative course c/b RV failure resulting in cardiogenic shock requiring high dose pressors necessitating RVAD support. Initial post-op course c/b hemopericardium requiring repeat washout with chest left open, however has since recovered well with decannulation on 7/21 with extubation day prior. Has continued to tolerate well from hemodynamic and end organ standpoint. He has had intermittent low flow alarms with high PI, no power spike, and a close aortic valve on TTE. Euvolemic on exam, but at times has orthostasis, so patient encouraged to stay hydrated within his fluid/diet restriction guidelines (no more than 2L/day of fluid intake and no more than 3g of Na per day in dietary intake).  - Volume status: euvolemic  - LVAD:   - interrogation showed no alarms in past 24 hr; last low flow alarm was 8/18 AM (flow 2.1, Sp 5300, power 3.6, PI 7.1)   - TTE today showed underfilling LV which might explain low flow alarm due to suckdown event  - increase hydral to 37.5 TID   - transition captopril to lisinopril 2.5mg daily  - continue digoxin for RV support  - pharmacy to dose warfarin   - continue ASA 81 mg qday    #Epistaxis, resolved  #Intubated due to Concern for airway protection (8/7)-Extubated (8/8)  #Mild-moderate emphysema  #History of COVID-19  #Iatrogenic R apical PTX  H/o nasal perforation that required elective procedure (in [place]) which was postponed due to LVAD insertion. Developed epistaxis not fixed by packing overnight on 8/6 and pt continued to bleed. Intubated due to concern for ability to maintain airway from bleeding. Controlled at bedside by ENT s/p cautery and packing. Appreciate assistance. Extubated 8/8. Cefepime/Vanc empirically for broad coverage for PNA (8/7-8/12). Patient removed his own nasal packing overnight on 8/9. ENT no longer following as patient has not been having issues with epistaxis.  - Ayr nasal  saline gel at bedtime + nasal saline q4H    #Insomnia/Delirium, resolved  Some concern that he had a fall overnight on 8/6. Discussed with nursing staff and he did NOT have a fall, although he did try to get out of bed. CT head was obtained (8/7) and it was unremarkable.  -Per psych recs:   - discontinue olanzapine   - Sertraline and Quetiapine per psych recs    #GERD  #Congestive hepatopathy in the setting of cardiac insuffiency - Resolved  #Hematemesis / oral mucosal bleeding -resolved    #Dysphagia  GI consulted 7/31 for black tarry stools and a possible GI bleed, however it is more likely swallowed blood from epistaxis that patient previously had. GI did not recommend an upper endoscopy. Recurrence of bloody stools likely due to epistaxis which have resolved. Will monitor Hb.  -transition PPI IV to oral given there was no obvious GI bleeding  -cleared for regular diet by SLP    #E.faecalis on culture from PICC line (8/2/22) - 1/2 tubes in 1/4 sets collected 8/2  #S.epidermidis (MSSE) on culture from PICC line (8/2/22) - 1/2 tubes in 1/4 sets collected 8/2  Vanc was supposed to end on 8/7, but given massive epistaxis will start broad spectrum antibiotics was treated with Cefepime/Vanc empirically for broad coverage for PNA (8/7-8/12).    #Anemia due to blood loss from Epistaxis-Resolved  #History of DVT and PE   #Antiphospholipid antibody syndrome  #History of iron deficiency anemia  -monitor CBC qday; transfuse to keep Hb>7    #SLE  - PTA meds: hydroxychloroquine 200mg, resumed 7/16  - Will restart PTA Cellcept at discharge    #Oral thrush, resolved  Completed 14 days of nystatin     Pertinent Lines  PIV    Feeds: PO diet  DVT Prophylaxis: Warfarin  GI Prophylaxis: PPI    Patient seen and discussed with Dr. Stewart.  Assessment and plan as above.    Spencer Mehta  PGY-3 Internal Medicine  Cards 2 service    Subjective:  Nursing notes reviewed. Patient feeling well this morning. Denies chest pain, palpitations,  sob.    Patient did have a low-flow alarm event after rounds around 1145.     Objective Findings:  Temp:  [97.4  F (36.3  C)-98.5  F (36.9  C)] 98.2  F (36.8  C)  Pulse:  [61-98] 92  Resp:  [16-18] 16  BP: ()/(61-89) 89/77  SpO2:  [96 %-98 %] 97 %    Physical Exam:  GEN: Frail  HEENT: No epistaxis noted.  Pulm: both lungs were clear  Cardiac: no JVD, LVAD hum +  GI: soft, non distended  Neuro: alert  Vascular: No lower extremity edema    Medications:    acetaminophen  975 mg Oral or Feeding Tube Q8H CAMMY     aspirin  81 mg Oral or Feeding Tube Daily     atorvastatin  40 mg Oral QPM     digoxin  125 mcg Oral Daily     heparin lock flush  5-20 mL Intracatheter Q24H     hydrALAZINE  37.5 mg Oral Q8H CAMMY     hydroxychloroquine  200 mg Oral BID     lidocaine  1 patch Transdermal Q24h    And     lidocaine   Transdermal Q8H CAMMY     lisinopril  2.5 mg Oral Daily     melatonin  10 mg Oral At Bedtime     multivitamin w/minerals  1 tablet Oral Daily     pantoprazole  40 mg Oral QAM AC     QUEtiapine  150 mg Oral or Feeding Tube At Bedtime     QUEtiapine  25 mg Oral BID     saline nasal   Each Nare At Bedtime     sertraline  100 mg Oral Daily     sodium chloride  2 spray Both Nostrils Q4H     sodium chloride (PF)  10-40 mL Intracatheter Q8H     sodium chloride (PF)  10-40 mL Intracatheter Q8H     warfarin ANTICOAGULANT  5 mg Oral ONCE at 18:00     Warfarin Therapy Reminder  1 each Oral See Admin Instructions       dextrose Stopped (08/09/22 1430)       Labs:   CMP  Recent Labs   Lab 08/21/22  0510 08/20/22  0520 08/19/22  0642 08/18/22  0730    139 138 137   POTASSIUM 4.1 4.4 3.8 4.0   CHLORIDE 104 106 104 100   CO2 24 24 26 27   ANIONGAP 8 9 8 10   GLC 90 88 99 100*   BUN 12.1 11.4 12.9 14.7   CR 0.53* 0.60* 0.55* 0.59*   GFRESTIMATED >90 >90 >90 >90   ELDA 9.1 9.4 9.1 9.4   MAG 1.7 1.6* 1.7 1.9   PHOS 4.3 4.4  --   --    PROTTOTAL 6.7 7.0 6.7 7.2   ALBUMIN 3.2* 3.4* 3.2* 3.5   BILITOTAL 0.3 0.4 0.4 0.5   ALKPHOS  146* 142* 146* 152*   AST 24 25 23 26   ALT 13 12 10 9*     CBC  Recent Labs   Lab 08/21/22  0510 08/20/22  0520 08/19/22  0642 08/18/22  1543 08/18/22  0714   WBC 6.0 6.1 5.9  --  7.1   RBC 3.29* 3.44* 3.41*  --  3.59*   HGB 9.5* 9.8* 9.9*  9.9* 9.9* 10.4*   HCT 31.5* 32.8* 32.0*  --  33.9*   MCV 96 95 94  --  94   MCH 28.9 28.5 29.0  --  29.0   MCHC 30.2* 29.9* 30.9*  --  30.7*   RDW 16.9* 17.1* 17.0*  --  17.0*    262 242  --  255     Arterial Blood Gas  No lab results found in last 7 days.

## 2022-08-20 NOTE — PROGRESS NOTES
Shift [id number]   ?  A/I : Monitored vitals and assessed pt status. A0x4, calls appropriately. Vitals stable with MAP 79-96 and SBP 90-100s. HM3 with low flow alarm at 11:45 AM, patient sitting in chair at time, asymptomatic, PI fluctuated from 8-9 and BP 93/83, HR 98. Cards 2 updated. CTA obtained. Normal sinus rhythm, occasionally tachycardic. Afebrile. Urinating adequately via urinal. Last bowel movement this evening. Fair appetite on 3 gm Na diet and 2 L FR. Denies chest pain, palpitations, shortness of breath, nausea. Patient reports still dizziness with standing. Chronic lower back pain manageable with scheduled tylenol. LVAD driveline dressing changed with small amount of tan drainage and rash wear tape sits, old CT sites and sternal incision healing. Up with assist 1 with gait belt and walker, bed alarm and chair alarm on for safety. RPICC heparin locked.     Changed: Hydralazine increased, transitioned from captopril to lisinopril  Running:   PRN:   ?  P: Continue to monitor Pt status and report changes to treatment team. [facility] unable to take patient today, tentative discharge set for tomorrow, with ride scheduled for 1100.

## 2022-08-20 NOTE — PROGRESS NOTES
Care Management Follow Up    Length of Stay (days): 64    Expected Discharge Date: 08/21/2022     Concerns to be Addressed:       Patient plan of care discussed at interdisciplinary rounds: Yes    Anticipated Discharge Disposition:       Anticipated Discharge Services:    Anticipated Discharge DME:      Patient/family educated on Medicare website which has current facility and service quality ratings:    Education Provided on the Discharge Plan:    Patient/Family in Agreement with the Plan:      Referrals Placed by CM/SW:    Private pay costs discussed: Not applicable    Additional Information:  Weekend  following up on discharge plan. [facility] unable to take pt today due to staffing. Ride re-scheduled for tomorrow at 11am. Cards 2, bedside RN and pt updated.       Juliette Giron, CHRISTINESW

## 2022-08-20 NOTE — PLAN OF CARE
Neuro: A/Ox4.  Cardiac: SR 1st degree AVB. HR . LVAD WDL, no alarms. MAPS running slightly high in low to mid 90's- MD notified this am.    Respiratory: O2 sats stable on RA 96%, No SOB  GI/: Voiding in urinal, no BM overnight ,BM yesterday.   Diet/appetite: 3 GM NA diet, 2L FR  Activity:  Up with 1, GB, walker  Pain: Oxycodone for pain x 1 with relief.   Skin: LVAD CDI  LDA's: PICC HL'd    Plan: To go to  ARU 11am. Continue with POC. Notify primary team with changes.

## 2022-08-20 NOTE — PROVIDER NOTIFICATION
Cards 2 notified of low flow alarm occurring at 11:45, registered as a flow 2.3, with PI 3.3, but bedside monitor showing PI up to 9 and flows 2.8-3 after alarm triggered. Patient asymptomatic and sitting up in chair at time. BP 93/83, HR 95 and SR.

## 2022-08-21 ENCOUNTER — HOSPITAL ENCOUNTER (INPATIENT)
Facility: CLINIC | Age: 62
LOS: 14 days | Discharge: HOME HEALTH | DRG: 949 | End: 2022-09-04
Attending: PHYSICAL MEDICINE & REHABILITATION | Admitting: PHYSICAL MEDICINE & REHABILITATION
Payer: COMMERCIAL

## 2022-08-21 VITALS
RESPIRATION RATE: 16 BRPM | BODY MASS INDEX: 20.53 KG/M2 | TEMPERATURE: 98.2 F | SYSTOLIC BLOOD PRESSURE: 89 MMHG | HEIGHT: 67 IN | HEART RATE: 92 BPM | DIASTOLIC BLOOD PRESSURE: 77 MMHG | OXYGEN SATURATION: 97 % | WEIGHT: 130.8 LBS

## 2022-08-21 DIAGNOSIS — K64.4 EXTERNAL HEMORRHOIDS: ICD-10-CM

## 2022-08-21 DIAGNOSIS — Z95.811 LVAD (LEFT VENTRICULAR ASSIST DEVICE) PRESENT (H): ICD-10-CM

## 2022-08-21 DIAGNOSIS — I50.9 DECOMPENSATED HEART FAILURE (H): ICD-10-CM

## 2022-08-21 DIAGNOSIS — I50.20 HEART FAILURE WITH REDUCED EJECTION FRACTION, NYHA CLASS III (H): Primary | ICD-10-CM

## 2022-08-21 DIAGNOSIS — F41.9 ANXIETY: ICD-10-CM

## 2022-08-21 DIAGNOSIS — J34.89 NASAL SEPTAL PERFORATION: ICD-10-CM

## 2022-08-21 DIAGNOSIS — F51.02 ADJUSTMENT INSOMNIA: ICD-10-CM

## 2022-08-21 LAB
ALBUMIN SERPL BCG-MCNC: 3.2 G/DL (ref 3.5–5.2)
ALP SERPL-CCNC: 146 U/L (ref 40–129)
ALT SERPL W P-5'-P-CCNC: 13 U/L (ref 10–50)
ANION GAP SERPL CALCULATED.3IONS-SCNC: 8 MMOL/L (ref 7–15)
AST SERPL W P-5'-P-CCNC: 24 U/L (ref 10–50)
BILIRUB SERPL-MCNC: 0.3 MG/DL
BUN SERPL-MCNC: 12.1 MG/DL (ref 8–23)
CA-I BLD-MCNC: 4.9 MG/DL (ref 4.4–5.2)
CALCIUM SERPL-MCNC: 9.1 MG/DL (ref 8.8–10.2)
CHLORIDE SERPL-SCNC: 104 MMOL/L (ref 98–107)
CREAT SERPL-MCNC: 0.53 MG/DL (ref 0.67–1.17)
DEPRECATED HCO3 PLAS-SCNC: 24 MMOL/L (ref 22–29)
ERYTHROCYTE [DISTWIDTH] IN BLOOD BY AUTOMATED COUNT: 16.9 % (ref 10–15)
GFR SERPL CREATININE-BSD FRML MDRD: >90 ML/MIN/1.73M2
GLUCOSE SERPL-MCNC: 90 MG/DL (ref 70–99)
HCT VFR BLD AUTO: 31.5 % (ref 40–53)
HGB BLD-MCNC: 9.5 G/DL (ref 13.3–17.7)
INR PPP: 2.44 (ref 0.85–1.15)
LACTATE SERPL-SCNC: 0.8 MMOL/L (ref 0.7–2)
LDH SERPL L TO P-CCNC: 263 U/L (ref 0–250)
MAGNESIUM SERPL-MCNC: 1.7 MG/DL (ref 1.7–2.3)
MAGNESIUM SERPL-MCNC: 1.7 MG/DL (ref 1.7–2.3)
MCH RBC QN AUTO: 28.9 PG (ref 26.5–33)
MCHC RBC AUTO-ENTMCNC: 30.2 G/DL (ref 31.5–36.5)
MCV RBC AUTO: 96 FL (ref 78–100)
PHOSPHATE SERPL-MCNC: 4.3 MG/DL (ref 2.5–4.5)
PLATELET # BLD AUTO: 243 10E3/UL (ref 150–450)
POTASSIUM SERPL-SCNC: 4.1 MMOL/L (ref 3.4–5.3)
PROT SERPL-MCNC: 6.7 G/DL (ref 6.4–8.3)
RBC # BLD AUTO: 3.29 10E6/UL (ref 4.4–5.9)
SODIUM SERPL-SCNC: 136 MMOL/L (ref 136–145)
WBC # BLD AUTO: 6 10E3/UL (ref 4–11)

## 2022-08-21 PROCEDURE — 250N000013 HC RX MED GY IP 250 OP 250 PS 637: Performed by: INTERNAL MEDICINE

## 2022-08-21 PROCEDURE — 250N000013 HC RX MED GY IP 250 OP 250 PS 637: Performed by: PHYSICIAN ASSISTANT

## 2022-08-21 PROCEDURE — 83605 ASSAY OF LACTIC ACID: CPT | Performed by: INTERNAL MEDICINE

## 2022-08-21 PROCEDURE — 250N000011 HC RX IP 250 OP 636: Performed by: INTERNAL MEDICINE

## 2022-08-21 PROCEDURE — 250N000013 HC RX MED GY IP 250 OP 250 PS 637

## 2022-08-21 PROCEDURE — 82330 ASSAY OF CALCIUM: CPT | Performed by: INTERNAL MEDICINE

## 2022-08-21 PROCEDURE — 85610 PROTHROMBIN TIME: CPT | Performed by: INTERNAL MEDICINE

## 2022-08-21 PROCEDURE — 250N000013 HC RX MED GY IP 250 OP 250 PS 637: Performed by: STUDENT IN AN ORGANIZED HEALTH CARE EDUCATION/TRAINING PROGRAM

## 2022-08-21 PROCEDURE — 99239 HOSP IP/OBS DSCHRG MGMT >30: CPT | Mod: 25 | Performed by: INTERNAL MEDICINE

## 2022-08-21 PROCEDURE — 36592 COLLECT BLOOD FROM PICC: CPT | Performed by: INTERNAL MEDICINE

## 2022-08-21 PROCEDURE — 93750 INTERROGATION VAD IN PERSON: CPT | Performed by: INTERNAL MEDICINE

## 2022-08-21 PROCEDURE — 80053 COMPREHEN METABOLIC PANEL: CPT | Performed by: INTERNAL MEDICINE

## 2022-08-21 PROCEDURE — 83615 LACTATE (LD) (LDH) ENZYME: CPT

## 2022-08-21 PROCEDURE — 128N000003 HC R&B REHAB

## 2022-08-21 PROCEDURE — 250N000011 HC RX IP 250 OP 636: Performed by: PHYSICAL MEDICINE & REHABILITATION

## 2022-08-21 PROCEDURE — 84100 ASSAY OF PHOSPHORUS: CPT | Performed by: THORACIC SURGERY (CARDIOTHORACIC VASCULAR SURGERY)

## 2022-08-21 PROCEDURE — 250N000013 HC RX MED GY IP 250 OP 250 PS 637: Performed by: SURGERY

## 2022-08-21 PROCEDURE — 250N000012 HC RX MED GY IP 250 OP 636 PS 637

## 2022-08-21 PROCEDURE — 99223 1ST HOSP IP/OBS HIGH 75: CPT | Mod: GC | Performed by: PHYSICAL MEDICINE & REHABILITATION

## 2022-08-21 PROCEDURE — 99233 SBSQ HOSP IP/OBS HIGH 50: CPT | Performed by: INTERNAL MEDICINE

## 2022-08-21 PROCEDURE — 83735 ASSAY OF MAGNESIUM: CPT | Performed by: THORACIC SURGERY (CARDIOTHORACIC VASCULAR SURGERY)

## 2022-08-21 PROCEDURE — 85014 HEMATOCRIT: CPT

## 2022-08-21 RX ORDER — SODIUM CHLORIDE/ALOE VERA
GEL (GRAM) NASAL AT BEDTIME
Refills: 0 | Status: ON HOLD
Start: 2022-08-21 | End: 2022-09-01

## 2022-08-21 RX ORDER — MULTIPLE VITAMINS W/ MINERALS TAB 9MG-400MCG
1 TAB ORAL DAILY
Status: DISCONTINUED | OUTPATIENT
Start: 2022-08-22 | End: 2022-08-21

## 2022-08-21 RX ORDER — MYCOPHENOLATE MOFETIL 500 MG/1
1500 TABLET ORAL 2 TIMES DAILY
Status: DISCONTINUED | OUTPATIENT
Start: 2022-08-21 | End: 2022-09-04 | Stop reason: HOSPADM

## 2022-08-21 RX ORDER — LORAZEPAM 0.5 MG/1
.5-1 TABLET ORAL EVERY 4 HOURS PRN
Status: DISCONTINUED | OUTPATIENT
Start: 2022-08-21 | End: 2022-09-04 | Stop reason: HOSPADM

## 2022-08-21 RX ORDER — ACETAMINOPHEN 325 MG/1
975 TABLET ORAL EVERY 8 HOURS PRN
Refills: 0 | Status: ON HOLD
Start: 2022-08-21 | End: 2022-09-01

## 2022-08-21 RX ORDER — WARFARIN SODIUM 5 MG/1
5 TABLET ORAL
Status: COMPLETED | OUTPATIENT
Start: 2022-08-21 | End: 2022-08-21

## 2022-08-21 RX ORDER — QUETIAPINE FUMARATE 50 MG/1
150 TABLET, FILM COATED ORAL AT BEDTIME
Status: DISCONTINUED | OUTPATIENT
Start: 2022-08-21 | End: 2022-09-04 | Stop reason: HOSPADM

## 2022-08-21 RX ORDER — SODIUM CHLORIDE/ALOE VERA
GEL (GRAM) NASAL AT BEDTIME
Status: DISCONTINUED | OUTPATIENT
Start: 2022-08-21 | End: 2022-08-21

## 2022-08-21 RX ORDER — NALOXONE HYDROCHLORIDE 0.4 MG/ML
0.2 INJECTION, SOLUTION INTRAMUSCULAR; INTRAVENOUS; SUBCUTANEOUS
Status: DISCONTINUED | OUTPATIENT
Start: 2022-08-21 | End: 2022-08-21

## 2022-08-21 RX ORDER — LISINOPRIL 2.5 MG/1
2.5 TABLET ORAL DAILY
Status: DISCONTINUED | OUTPATIENT
Start: 2022-08-22 | End: 2022-08-31

## 2022-08-21 RX ORDER — LISINOPRIL 2.5 MG/1
5 TABLET ORAL DAILY
Start: 2022-08-21 | End: 2022-08-21

## 2022-08-21 RX ORDER — NALOXONE HYDROCHLORIDE 0.4 MG/ML
0.4 INJECTION, SOLUTION INTRAMUSCULAR; INTRAVENOUS; SUBCUTANEOUS
Status: DISCONTINUED | OUTPATIENT
Start: 2022-08-21 | End: 2022-08-21

## 2022-08-21 RX ORDER — PANTOPRAZOLE SODIUM 40 MG/1
40 TABLET, DELAYED RELEASE ORAL
Status: DISCONTINUED | OUTPATIENT
Start: 2022-08-22 | End: 2022-08-21

## 2022-08-21 RX ORDER — DIGOXIN 125 MCG
125 TABLET ORAL DAILY
Refills: 0 | Status: ON HOLD
Start: 2022-08-21 | End: 2022-09-01

## 2022-08-21 RX ORDER — LIDOCAINE 4 G/G
1 PATCH TOPICAL
Status: DISCONTINUED | OUTPATIENT
Start: 2022-08-22 | End: 2022-08-21

## 2022-08-21 RX ORDER — ATORVASTATIN CALCIUM 40 MG/1
40 TABLET, FILM COATED ORAL EVERY EVENING
Status: DISCONTINUED | OUTPATIENT
Start: 2022-08-21 | End: 2022-08-21

## 2022-08-21 RX ORDER — WARFARIN SODIUM 5 MG/1
5 TABLET ORAL DAILY
Status: DISCONTINUED | OUTPATIENT
Start: 2022-08-21 | End: 2022-08-21

## 2022-08-21 RX ORDER — OXYCODONE HYDROCHLORIDE 5 MG/1
5 TABLET ORAL EVERY 4 HOURS PRN
Status: DISCONTINUED | OUTPATIENT
Start: 2022-08-21 | End: 2022-08-21

## 2022-08-21 RX ORDER — NALOXONE HYDROCHLORIDE 0.4 MG/ML
0.2 INJECTION, SOLUTION INTRAMUSCULAR; INTRAVENOUS; SUBCUTANEOUS
Status: DISCONTINUED | OUTPATIENT
Start: 2022-08-21 | End: 2022-09-04 | Stop reason: HOSPADM

## 2022-08-21 RX ORDER — LISINOPRIL 2.5 MG/1
2.5 TABLET ORAL DAILY
Status: DISCONTINUED | OUTPATIENT
Start: 2022-08-21 | End: 2022-08-21

## 2022-08-21 RX ORDER — NALOXONE HYDROCHLORIDE 0.4 MG/ML
0.4 INJECTION, SOLUTION INTRAMUSCULAR; INTRAVENOUS; SUBCUTANEOUS
Status: DISCONTINUED | OUTPATIENT
Start: 2022-08-21 | End: 2022-09-04 | Stop reason: HOSPADM

## 2022-08-21 RX ORDER — AMOXICILLIN 250 MG
1-4 CAPSULE ORAL 2 TIMES DAILY PRN
Status: DISCONTINUED | OUTPATIENT
Start: 2022-08-21 | End: 2022-09-02

## 2022-08-21 RX ORDER — PANTOPRAZOLE SODIUM 40 MG/1
40 TABLET, DELAYED RELEASE ORAL
Refills: 0 | Status: ON HOLD
Start: 2022-08-21 | End: 2022-09-01

## 2022-08-21 RX ORDER — QUETIAPINE FUMARATE 25 MG/1
25 TABLET, FILM COATED ORAL 2 TIMES DAILY
Refills: 0 | Status: ON HOLD
Start: 2022-08-21 | End: 2022-09-01

## 2022-08-21 RX ORDER — POLYETHYLENE GLYCOL 3350 17 G/17G
17 POWDER, FOR SOLUTION ORAL DAILY PRN
Status: DISCONTINUED | OUTPATIENT
Start: 2022-08-21 | End: 2022-08-21

## 2022-08-21 RX ORDER — POLYETHYLENE GLYCOL 3350 17 G/17G
17 POWDER, FOR SOLUTION ORAL DAILY PRN
Status: DISCONTINUED | OUTPATIENT
Start: 2022-08-21 | End: 2022-09-02

## 2022-08-21 RX ORDER — LISINOPRIL 2.5 MG/1
2.5 TABLET ORAL DAILY
Start: 2022-08-22 | End: 2022-08-21

## 2022-08-21 RX ORDER — SERTRALINE HYDROCHLORIDE 100 MG/1
100 TABLET, FILM COATED ORAL DAILY
Status: DISCONTINUED | OUTPATIENT
Start: 2022-08-22 | End: 2022-09-04 | Stop reason: HOSPADM

## 2022-08-21 RX ORDER — MULTIPLE VITAMINS W/ MINERALS TAB 9MG-400MCG
1 TAB ORAL DAILY
Refills: 0
Start: 2022-08-21 | End: 2023-08-24

## 2022-08-21 RX ORDER — ASPIRIN 81 MG/1
81 TABLET, CHEWABLE ORAL DAILY
Status: DISCONTINUED | OUTPATIENT
Start: 2022-08-22 | End: 2022-09-04 | Stop reason: HOSPADM

## 2022-08-21 RX ORDER — ACETAMINOPHEN 325 MG/1
975 TABLET ORAL EVERY 8 HOURS
Status: DISCONTINUED | OUTPATIENT
Start: 2022-08-21 | End: 2022-09-04 | Stop reason: HOSPADM

## 2022-08-21 RX ORDER — ASPIRIN 81 MG/1
81 TABLET, CHEWABLE ORAL DAILY
Status: DISCONTINUED | OUTPATIENT
Start: 2022-08-22 | End: 2022-08-21

## 2022-08-21 RX ORDER — PANTOPRAZOLE SODIUM 40 MG/1
40 TABLET, DELAYED RELEASE ORAL
Status: DISCONTINUED | OUTPATIENT
Start: 2022-08-22 | End: 2022-09-02

## 2022-08-21 RX ORDER — HYDROXYCHLOROQUINE SULFATE 200 MG/1
200 TABLET, FILM COATED ORAL 2 TIMES DAILY
Status: DISCONTINUED | OUTPATIENT
Start: 2022-08-21 | End: 2022-09-04 | Stop reason: HOSPADM

## 2022-08-21 RX ORDER — QUETIAPINE FUMARATE 25 MG/1
50 TABLET, FILM COATED ORAL
Status: DISCONTINUED | OUTPATIENT
Start: 2022-08-21 | End: 2022-09-04 | Stop reason: HOSPADM

## 2022-08-21 RX ORDER — DIGOXIN 125 MCG
125 TABLET ORAL DAILY
Status: DISCONTINUED | OUTPATIENT
Start: 2022-08-22 | End: 2022-09-04 | Stop reason: HOSPADM

## 2022-08-21 RX ORDER — QUETIAPINE FUMARATE 25 MG/1
25 TABLET, FILM COATED ORAL 2 TIMES DAILY
Status: DISCONTINUED | OUTPATIENT
Start: 2022-08-21 | End: 2022-08-21

## 2022-08-21 RX ORDER — MYCOPHENOLATE MOFETIL 500 MG/1
500 TABLET ORAL
Status: DISCONTINUED | OUTPATIENT
Start: 2022-08-21 | End: 2022-08-21

## 2022-08-21 RX ORDER — QUETIAPINE FUMARATE 25 MG/1
25 TABLET, FILM COATED ORAL 2 TIMES DAILY
Status: DISCONTINUED | OUTPATIENT
Start: 2022-08-21 | End: 2022-09-04 | Stop reason: HOSPADM

## 2022-08-21 RX ORDER — ATORVASTATIN CALCIUM 40 MG/1
40 TABLET, FILM COATED ORAL EVERY EVENING
Status: DISCONTINUED | OUTPATIENT
Start: 2022-08-21 | End: 2022-09-04 | Stop reason: HOSPADM

## 2022-08-21 RX ORDER — MAGNESIUM SULFATE HEPTAHYDRATE 40 MG/ML
2 INJECTION, SOLUTION INTRAVENOUS ONCE
Status: COMPLETED | OUTPATIENT
Start: 2022-08-21 | End: 2022-08-21

## 2022-08-21 RX ORDER — WARFARIN SODIUM 5 MG/1
5 TABLET ORAL
Status: DISCONTINUED | OUTPATIENT
Start: 2022-08-21 | End: 2022-08-21

## 2022-08-21 RX ORDER — MULTIPLE VITAMINS W/ MINERALS TAB 9MG-400MCG
1 TAB ORAL DAILY
Status: DISCONTINUED | OUTPATIENT
Start: 2022-08-22 | End: 2022-09-04 | Stop reason: HOSPADM

## 2022-08-21 RX ORDER — OXYCODONE HYDROCHLORIDE 5 MG/1
5 TABLET ORAL EVERY 6 HOURS PRN
Status: DISCONTINUED | OUTPATIENT
Start: 2022-08-21 | End: 2022-09-04 | Stop reason: HOSPADM

## 2022-08-21 RX ORDER — LISINOPRIL 2.5 MG/1
2.5 TABLET ORAL DAILY
Status: DISCONTINUED | OUTPATIENT
Start: 2022-08-22 | End: 2022-08-21

## 2022-08-21 RX ORDER — HYDROXYCHLOROQUINE SULFATE 200 MG/1
200 TABLET, FILM COATED ORAL 2 TIMES DAILY
Status: DISCONTINUED | OUTPATIENT
Start: 2022-08-21 | End: 2022-08-21

## 2022-08-21 RX ORDER — ACETAMINOPHEN 325 MG/1
975 TABLET ORAL EVERY 8 HOURS
Status: DISCONTINUED | OUTPATIENT
Start: 2022-08-21 | End: 2022-08-21

## 2022-08-21 RX ORDER — ASPIRIN 81 MG/1
81 TABLET, CHEWABLE ORAL DAILY
Refills: 0 | Status: ON HOLD
Start: 2022-08-21 | End: 2022-09-01

## 2022-08-21 RX ORDER — LIDOCAINE 4 G/G
1 PATCH TOPICAL EVERY 24 HOURS
Status: DISCONTINUED | OUTPATIENT
Start: 2022-08-22 | End: 2022-08-24

## 2022-08-21 RX ORDER — HYDRALAZINE HYDROCHLORIDE 25 MG/1
37.5 TABLET, FILM COATED ORAL EVERY 8 HOURS
Refills: 0 | Status: ON HOLD
Start: 2022-08-21 | End: 2022-09-01

## 2022-08-21 RX ORDER — LIDOCAINE 4 G/G
1 PATCH TOPICAL EVERY 24 HOURS
Refills: 0 | Status: ON HOLD
Start: 2022-08-21 | End: 2022-09-01

## 2022-08-21 RX ORDER — DIGOXIN 125 MCG
125 TABLET ORAL DAILY
Status: DISCONTINUED | OUTPATIENT
Start: 2022-08-22 | End: 2022-08-21

## 2022-08-21 RX ORDER — HEPARIN SODIUM,PORCINE 10 UNIT/ML
5-20 VIAL (ML) INTRAVENOUS
Status: DISCONTINUED | OUTPATIENT
Start: 2022-08-21 | End: 2022-08-25 | Stop reason: CLARIF

## 2022-08-21 RX ORDER — OXYCODONE HYDROCHLORIDE 5 MG/1
5 TABLET ORAL EVERY 6 HOURS PRN
Status: DISCONTINUED | OUTPATIENT
Start: 2022-08-21 | End: 2022-08-21

## 2022-08-21 RX ORDER — LISINOPRIL 2.5 MG/1
2.5 TABLET ORAL DAILY
Refills: 0 | Status: ON HOLD
Start: 2022-08-21 | End: 2022-09-01

## 2022-08-21 RX ORDER — QUETIAPINE FUMARATE 50 MG/1
50 TABLET, FILM COATED ORAL
Refills: 0 | Status: ON HOLD
Start: 2022-08-21 | End: 2022-09-01

## 2022-08-21 RX ORDER — LISINOPRIL 2.5 MG/1
2.5 TABLET ORAL DAILY
Status: DISCONTINUED | OUTPATIENT
Start: 2022-08-21 | End: 2022-08-21 | Stop reason: HOSPADM

## 2022-08-21 RX ORDER — MYCOPHENOLATE MOFETIL 500 MG/1
1500 TABLET, FILM COATED ORAL
Status: DISCONTINUED | OUTPATIENT
Start: 2022-08-21 | End: 2022-08-21 | Stop reason: CLARIF

## 2022-08-21 RX ORDER — SERTRALINE HYDROCHLORIDE 100 MG/1
100 TABLET, FILM COATED ORAL DAILY
Refills: 0 | Status: ON HOLD
Start: 2022-08-21 | End: 2022-09-01

## 2022-08-21 RX ORDER — SERTRALINE HYDROCHLORIDE 100 MG/1
100 TABLET, FILM COATED ORAL DAILY
Status: DISCONTINUED | OUTPATIENT
Start: 2022-08-22 | End: 2022-08-21

## 2022-08-21 RX ORDER — QUETIAPINE FUMARATE 50 MG/1
150 TABLET, FILM COATED ORAL AT BEDTIME
Refills: 0 | Status: ON HOLD
Start: 2022-08-21 | End: 2022-09-01

## 2022-08-21 RX ORDER — WARFARIN SODIUM 5 MG/1
5 TABLET ORAL
Status: DISCONTINUED | OUTPATIENT
Start: 2022-08-21 | End: 2022-08-21 | Stop reason: HOSPADM

## 2022-08-21 RX ORDER — HEPARIN SODIUM,PORCINE 10 UNIT/ML
5-20 VIAL (ML) INTRAVENOUS EVERY 24 HOURS
Status: DISCONTINUED | OUTPATIENT
Start: 2022-08-21 | End: 2022-08-25 | Stop reason: CLARIF

## 2022-08-21 RX ADMIN — ACETAMINOPHEN 975 MG: 325 TABLET, FILM COATED ORAL at 03:13

## 2022-08-21 RX ADMIN — MYCOPHENOLATE MOFETIL 1500 MG: 500 TABLET, FILM COATED ORAL at 21:12

## 2022-08-21 RX ADMIN — SALINE NASAL SPRAY 2 SPRAY: 1.5 SOLUTION NASAL at 09:06

## 2022-08-21 RX ADMIN — Medication 37.5 MG: at 16:08

## 2022-08-21 RX ADMIN — ACETAMINOPHEN 975 MG: 325 TABLET, FILM COATED ORAL at 21:12

## 2022-08-21 RX ADMIN — QUETIAPINE FUMARATE 25 MG: 25 TABLET ORAL at 09:05

## 2022-08-21 RX ADMIN — ACETAMINOPHEN 975 MG: 325 TABLET, FILM COATED ORAL at 14:57

## 2022-08-21 RX ADMIN — DIGOXIN 125 MCG: 125 TABLET ORAL at 09:05

## 2022-08-21 RX ADMIN — SALINE NASAL SPRAY 2 SPRAY: 1.5 SOLUTION NASAL at 03:13

## 2022-08-21 RX ADMIN — ATORVASTATIN CALCIUM 40 MG: 40 TABLET, FILM COATED ORAL at 21:12

## 2022-08-21 RX ADMIN — QUETIAPINE FUMARATE 25 MG: 25 TABLET ORAL at 16:08

## 2022-08-21 RX ADMIN — Medication 37.5 MG: at 05:43

## 2022-08-21 RX ADMIN — PANTOPRAZOLE SODIUM 40 MG: 40 TABLET, DELAYED RELEASE ORAL at 09:05

## 2022-08-21 RX ADMIN — SALINE NASAL SPRAY 2 SPRAY: 1.5 SOLUTION NASAL at 18:21

## 2022-08-21 RX ADMIN — Medication 10 MG: at 21:12

## 2022-08-21 RX ADMIN — ASPIRIN 81 MG CHEWABLE TABLET 81 MG: 81 TABLET CHEWABLE at 09:05

## 2022-08-21 RX ADMIN — QUETIAPINE FUMARATE 150 MG: 50 TABLET ORAL at 21:55

## 2022-08-21 RX ADMIN — Medication 1 TABLET: at 09:05

## 2022-08-21 RX ADMIN — SERTRALINE HYDROCHLORIDE 100 MG: 100 TABLET ORAL at 09:05

## 2022-08-21 RX ADMIN — SALINE NASAL SPRAY 2 SPRAY: 1.5 SOLUTION NASAL at 16:10

## 2022-08-21 RX ADMIN — MAGNESIUM SULFATE IN WATER 2 G: 40 INJECTION, SOLUTION INTRAVENOUS at 10:00

## 2022-08-21 RX ADMIN — Medication 5 ML: at 21:12

## 2022-08-21 RX ADMIN — HYDROXYCHLOROQUINE SULFATE 200 MG: 200 TABLET, FILM COATED ORAL at 21:12

## 2022-08-21 RX ADMIN — HYDROXYCHLOROQUINE SULFATE 200 MG: 200 TABLET, FILM COATED ORAL at 09:05

## 2022-08-21 RX ADMIN — LISINOPRIL 2.5 MG: 2.5 TABLET ORAL at 10:05

## 2022-08-21 RX ADMIN — OXYCODONE HYDROCHLORIDE 5 MG: 5 TABLET ORAL at 21:11

## 2022-08-21 RX ADMIN — WARFARIN SODIUM 5 MG: 5 TABLET ORAL at 18:21

## 2022-08-21 ASSESSMENT — ACTIVITIES OF DAILY LIVING (ADL)
ADLS_ACUITY_SCORE: 33
ADLS_ACUITY_SCORE: 37
TOILETING_ISSUES: NO
ADLS_ACUITY_SCORE: 22
WALKING_OR_CLIMBING_STAIRS_DIFFICULTY: NO
ADLS_ACUITY_SCORE: 37
FALL_HISTORY_WITHIN_LAST_SIX_MONTHS: NO
ADLS_ACUITY_SCORE: 37
ADLS_ACUITY_SCORE: 22
WEAR_GLASSES_OR_BLIND: YES
DIFFICULTY_EATING/SWALLOWING: NO
CHANGE_IN_FUNCTIONAL_STATUS_SINCE_ONSET_OF_CURRENT_ILLNESS/INJURY: YES
ADLS_ACUITY_SCORE: 37
DIFFICULTY_COMMUNICATING: NO
CONCENTRATING,_REMEMBERING_OR_MAKING_DECISIONS_DIFFICULTY: NO
ADLS_ACUITY_SCORE: 37
ADLS_ACUITY_SCORE: 31
DOING_ERRANDS_INDEPENDENTLY_DIFFICULTY: NO
ADLS_ACUITY_SCORE: 33
HEARING_DIFFICULTY_OR_DEAF: NO
VISION_MANAGEMENT: READING GLASSES
DRESSING/BATHING_DIFFICULTY: NO
ADLS_ACUITY_SCORE: 37
ADLS_ACUITY_SCORE: 31

## 2022-08-21 NOTE — CONSULTS
[facility]  Consult Note - Hospitalist Service  Date of Admission:  8/21/2022   Consult Requested by: PM&R service   Reason for Consult: medical management     Assessment & Plan   Dandy Sands is a 61 year old male with complex medical issues. Past medical history includes SLE ,  antiphospholipid syndrome , history pulmonary embolism   on anticoagulation with warfarin, HFrEF due to IMC, HDL.  [facility]  [facility] vomiting abdominal bloating and was transferred to [facility] 6/17  With decompensated heart failure, cardiogenic shock, multiorgan failure. Did require IABP  And is now status post  HM3 LVAD 7/8/22 by Dr Zamora. Surgery was complicated with RV failure  Had 29F Protek duo via RIJ, RVAD removed 7/21 , hemipericardium  requiring repeat washout, chest was closed 7/13/22. Also had significant epistaxis and required intubation 8/7  for airway protection, patient  Removed his own nasal packing. He was  extubated  8/8 and doing well.         He was transferred to acute inpatient rehab 8/21/2022 for further rehab and cares      CNS, PSYCH   -insomnia, continue melatonin   -Delirium   -CT head negative for acute intracranial pathology 8/7   - continue sertraline and quetiapine 150 mg at night and 25 mg bid     -depression, continue zoloft, had been on ativan PTA but has not been getting so did not reorder     CARDIOVASCULAR   -HFrEF 2/2 ICM s/p HM3 LVAD in setting of cardiogenic shock (SCAI D)    was evaluated for heart transplant  But due to elevated DSAs decision  was made to proceed with   LVAD likely destination     had low flow alarm 8/16 , had  Work up  And no clear etiology found     CTA 8/20  normal flows     TAVARES 8/20 underfilling of LV and seed decreased 5300->5200 and no alarms since , continue to monitor closely     Currently on hydralazine  37.5 tid, digoxin 125 mcg  RV support  , was  on captopril 12.5 mg tid and was   "transitioned to lisinopril 2.5 mg daily  8/20      goal MAP to be bellow 80, medications to be adjusted by cardiology      Pharmacy to dose warfarin, INR goal 2-3      continue aspirin 81 mg     needs EP evaluation for increasing capture threshold of RV lead     Call  [phone] , [phone]  -RV failure s/p Protek duo temporary RVAD s/p decannulation 7/21    was on high dose pressors , now on blood pressure  Medications    -RV thrombus    On AC with warfarin   -post chest closure hemopericardium s/p repeat washout (7/12)    status post  Closure   -PMH CAD    s/p PCI to LAD  in 2005, NTEMI 2007 and RCA , had CER-D 2006 , coronary angio 5/2022 showed severe ostial LAD disease with instent restenosis  Mid LAD   Involved diagonal bifurcation  Mild LCx disease and severe prox RCA disease     CABG was considered but decision was made for  medical therapy in light of his low EF and cardiac MRI viability study showed nonviable myocardium in LAD territory    -S/p CRT-D (2006  -history of severe mitral regurgitation     Not surgical candidate      PULMONARY  --required intubation for airway protection form epistaxis 8/7 and extubated 8/8  - mild-moderate  Emphysema, seen by pulmonary, stared continue Breao Ellipta ,  have PRN inhalers available, PFT as able     - iatrogenic apical PTX, resolved  - PNA, was treated with cefepime and vancomycin  8/7-8/12   -history previous pulmonary embolism , had been on chronic warfarin therapy     ENT  -epistaxis needing intubation for airway protection while on AC and aspirin , status post  cauterisation by ENT  - history nasal perforation history elective procedure in [place]   - was to raffi  [place] nasal saline gel but has not been getting for some time continue saline nasal spary qid    - if needs O2 add humidity   - per ENT \" If bleeding recurs, spray Afrin (3 sprays) and hold firm digital pressure over the soft part of the nose for 20 minutes continuously. Nasal clips are ineffective " "and should not be used in place of digital pressure. If bleeding continues despite these measures, repeat. If bleeding continues or is severe please contact ENT\"    ID  -E.faecalis on culture from PICC line (8/2/22) - 1/2 tubes in 1/4 sets collected 8/2, S.epidermidis (MSSE) on culture from PICC line (8/2/22) - 1/2 tubes in 1/4 sets collected 8/2  - was on cefepime and vancomycin , now off antibiotics   -treated for oral thrush for 14 days   -history COVID-19 1/2022 ( needed intubation )       GI  -hematemesis, black tarry stool , ws seen by GI and was felt was due to epistaxis and not GI bleed , on PPI   -congestive hepatopathy, shocked liver on presentation, now LFT back to normal    -Dysphasia, now on regular diet     HEME  -acute blood loss anemia on chronic Fe deficiency  anemia   -history DVT and pulmonary embolism PTA was on chronic anticoagulation with warfarin and now back on   - history antiphospholipid ab syndrome   -on chronic anticoagulation PTA an dis on warfarin as above      RHEUMATOLOGY  -SLE, back on PTA hydroxychloroquine since 7/17  -PTA cellcept 1500 mg po bid and ordered to   Restart on rehab per  Cardiology      RENAL  -normal renal functions       -BPH, had been on flomax 0.4 mg before, currently not on     ENDO  -HD, continue lipitor       Vaccination status  -COVID-19  Received 2 shots and 1 booster   -Prevnar 13 4/2018       Makenna Bermudez MD  [facility]  Securely message with the Vocera Web Console (learn more here)  Text page via [facility] Paging/[facility]       Hospitalist Service    Clinically Significant Risk Factors Present on Admission             # Hypoalbuminemia: Albumin = 3.2 g/dL (Ref range: 3.5 - 5.2 g/dL) on admission, will monitor as appropriate   # Coagulation Defect: INR = 2.44 (Ref range: 0.85 - 1.15) and/or PTT = N/A on admission, will monitor for bleeding    # End stage heart failure: LVAD present     "     ______________________________________________________________________    Chief Complaint     shortness of breath    History is obtained from the patient and reviewing  Records in Epic     History of Present Illness   Dandy Sands is a 61 year old male with complex medical issues. Past medical history includes SLE ,  antiphospholipid syndrome , history pulmonary embolism   on anticoagulation with warfarin, HFrEF due to IMC, HDL.  [facility]  [facility] vomiting abdominal bloating and was transferred to [facility] 6/[facility]  With decompensated heart failure, cardiogenic shock, multiorgan failure. Did require IABP  And is now status post  HM3 LVAD 7/8/22 by Dr Zamora. Surgery was complicated with RV failure  Had 29F Protek duo via RIJ, RVAD removed 7/21 , hemipericardium  requiring repeat washout, chest was closed 7/13/22. Also had significant epistaxis and required intubation 8/7  for airway protection, patient  Removed his own nasal packing. He was  extubated  8/8 and doing well.         He was transferred to acute inpatient rehab 8/21/2022 for further rehab and cares       He has some lower back pain, no chest pain  No shortness of breath  No nausea vomiting. Does get bit light headed and nauseated when gets up. No abdominal pain    Review of Systems   The 10 point Review of Systems is negative other than noted in the HPI or here.     Past Medical History    I have reviewed this patient's medical history and updated it with pertinent information if needed.   Past Medical History:   Diagnosis Date     Antiphospholipid antibody syndrome (H)      CAD (coronary artery disease)      Chronic systolic heart failure (H)      Ischemic cardiomyopathy      Mitral regurgitation      Systemic lupus erythematosus (H)        Past Surgical History   I have reviewed this patient's surgical history and updated it with pertinent information if needed.  Past Surgical History:   Procedure Laterality Date      COLONOSCOPY N/A 05/23/2022    Procedure: COLONOSCOPY;  Surgeon: Parth Meneses MD;  [facility]: [facility] OR     CV CORONARY ANGIOGRAM N/A 05/24/2022    Procedure: Coronary Angiogram;  Surgeon: Mike Pope MD;  [facility]     CV CORONARY ANGIOGRAM N/A 05/29/2022    Procedure: Coronary Angiogram;  Surgeon: Austin Bright MD;  [facility]     CV CORONARY LITHOTRIPSY PCI Bilateral 05/24/2022    Procedure: Percutaneous Coronary Intervention - Lithotripsy;  Surgeon: Mike Pope MD;  [facility]     CV INTRA AORTIC BALLOON N/A 06/17/2022    Procedure: Intraprocedure Aortic Balloon Pump Insertion;  Surgeon: Sherman Cerda MD;  [facility]     CV INTRA AORTIC BALLOON Right 07/03/2022    Procedure: Reposition of Subclavian Intraprocedure Aortic Balloon Pump;  Surgeon: Austin Bright MD;  [facility]     CV INTRAVASULAR ULTRASOUND N/A 05/24/2022    Procedure: Intravascular Ultrasound;  Surgeon: Mike Pope MD;  [facility]     CV PCI ANGIOPLASTY N/A 05/29/2022    Procedure: Percutaneous Transluminal Angioplasty;  Surgeon: Austin Bright MD;  [facility]     CV PCI STENT DRUG ELUTING N/A 05/24/2022    Procedure: Percutaneous Coronary Intervention Stent;  Surgeon: Mike Pope MD;  [facility]     CV RIGHT HEART CATH MEASUREMENTS RECORDED N/A 05/18/2022    Procedure: Right Heart Catheterization;  Surgeon: Justo Stewart MD;  [facility]     CV RIGHT HEART CATH MEASUREMENTS RECORDED N/A 05/24/2022    Procedure: Right Heart Catheterization;  Surgeon: Mike Pope MD;  [facility]     CV RIGHT HEART CATH MEASUREMENTS RECORDED N/A 05/29/2022    Procedure: Right Heart Catheterization;  Surgeon: Austin Bright MD;  [facility]     CV RIGHT HEART CATH MEASUREMENTS RECORDED N/A 07/01/2022    Procedure: Right Heart Catheterization;  Surgeon: Mike Pope MD;  Location:  HEART CARDIAC CATH LAB     CV RIGHT HEART EXERCISE STRESS STUDY N/A 05/18/2022    Procedure: Stress Drug Study;  Surgeon: Justo Stewart MD;  Location:  HEART CARDIAC CATH LAB     CV SWAN CHOCO PROCEDURE N/A 06/17/2022    Procedure: Wellpinit Choco Procedure;  Surgeon: Sherman Cerda MD;  Location:  HEART CARDIAC CATH LAB     INCISION AND DRAINAGE CHEST WASHOUT, COMBINED N/A 07/11/2022    Procedure: Chest washout and closure;  Surgeon: Darnell Morgan MD;  [facility]     INCISION AND DRAINAGE CHEST WASHOUT, COMBINED N/A 07/12/2022    Procedure: INCISION AND DRAINAGE, WOUND, CHEST, WITH IRRIGATION;  Surgeon: Darius Zamora MD;  Location: [facility]     INSERT ARTERIAL LINE  07/18/2022          INSERT INTRAAORTIC BALLOON PUMP Right 06/23/2022    Procedure: INSERTION, INTRA-AORTIC BALLOON PUMP RIGHT SUBCLAVIAN ARTERY.;  Surgeon: Hayden Veras MD;  [facility]     INSERT VENTRICULAR ASSIST DEVICE LEFT (HEARTMATE II/III) MINIMALLY INVASIVE N/A 07/08/2022    Procedure: MEDIAN STERNOTOMY.  CARDIOPULMONARY BYPASS.  INTRAOPERATIVE TRANSESOPHAGEAL ECHOCARDIOGRAM.  IMPLANT LEFT VENTRICULAR ASSIST DEVICE HEARTMATE III.  PLACEMENT OF PERCUTANEOUS RIGHT VENTRICULAR ASSIST DEVICE.  TEMPORARY CHEST CLOSURE;  Surgeon: Darius Zamora MD;  [facility]     IR CHEST TUBE PLACEMENT NON-TUNNELED RIGHT  08/01/2022     IRRIGATION AND DEBRIDEMENT CHEST WASHOUT, COMBINED N/A 07/13/2022    Procedure: IRRIGATION AND DEBRIDEMENT, CHEST;  Surgeon: Daruis Zamora MD;  Location: [facility]     PICC DOUBLE LUMEN PLACEMENT Right 05/28/2022    right basilic 5 fr dl picc 40 cm     PICC DOUBLE LUMEN PLACEMENT Right 08/15/2022    Right Lateral, 41 total length, 3 cm external length       Social History   I have reviewed this patient's social history and updated  it with pertinent information if needed.       Family History      Hypertension Father     Heart Disease Mother   CAB x5     Alcohol/Drug Sister    Mother  72     Father  59   htn     Sister  55       Medications   Medications Prior to Admission   Medication Sig Dispense Refill Last Dose     acetaminophen (TYLENOL) 325 MG tablet 3 tablets (975 mg) by Oral or Feeding Tube route every 8 hours as needed for mild pain (Use first for pain)  0      aspirin (ASA) 81 MG chewable tablet 1 tablet (81 mg) by Oral or Feeding Tube route daily  0      atorvastatin (LIPITOR) 40 MG tablet Take 40 mg by mouth daily        digoxin (LANOXIN) 125 MCG tablet Take 1 tablet (125 mcg) by mouth daily  0      ferrous sulfate (FE TABS) 325 (65 Fe) MG EC tablet Take 325 mg by mouth daily (with lunch)        hydrALAZINE (APRESOLINE) 25 MG tablet Take 1.5 tablets (37.5 mg) by mouth every 8 hours  0      hydroxychloroquine (PLAQUENIL) 200 MG tablet Take 200 mg by mouth 2 times daily        hydrOXYzine (ATARAX) 25 MG tablet Take 1 tablet (25 mg) by mouth every 6 hours as needed for anxiety (ANXIETY) 60 tablet 3      Lidocaine (LIDOCARE) 4 % Patch Place 1 patch onto the skin every 24 hours To prevent lidocaine toxicity, patient should be patch free for 12 hrs daily.  0      lisinopril (ZESTRIL) 2.5 MG tablet Take 1 tablet (2.5 mg) by mouth daily  0      LORazepam (ATIVAN) 0.5 MG tablet Take 0.5-1 mg by mouth every 4 hours as needed for anxiety        melatonin 3 MG tablet Take 6 mg by mouth nightly as needed for sleep        multivitamin w/minerals (THERA-VIT-M) tablet Take 1 tablet by mouth daily  0      mycophenolate (GENERIC EQUIVALENT) 500 MG tablet Take 1,500 mg by mouth 2 times daily        pantoprazole (PROTONIX) 40 MG EC tablet Take 1 tablet (40 mg) by mouth every morning (before breakfast)  0      promethazine (PHENERGAN) 12.5 MG tablet Take 1 tablet (12.5 mg) by mouth every 6 hours as needed for nausea or vomiting  30 tablet 1      QUEtiapine (SEROQUEL) 25 MG tablet Take 1 tablet (25 mg) by mouth 2 times daily  0      QUEtiapine (SEROQUEL) 50 MG tablet 3 tablets (150 mg) by Oral or Feeding Tube route At Bedtime  0      QUEtiapine (SEROQUEL) 50 MG tablet Take 1 tablet (50 mg) by mouth nightly as needed (in addition to scheduled qhs dose PRN)  0      saline nasal (AYR SALINE) GEL topical gel Apply into each nare At Bedtime  0      sertraline (ZOLOFT) 100 MG tablet Take 1 tablet (100 mg) by mouth daily  0      sodium chloride (OCEAN) 0.65 % nasal spray Spray 2 sprays into both nostrils every 4 hours  0      tamsulosin (FLOMAX) 0.4 MG capsule Take 0.4 mg by mouth daily        thiamine (B-1) 100 MG tablet Take 1 tablet (100 mg) by mouth daily 30 tablet 0      warfarin ANTICOAGULANT (COUMADIN) 5 MG tablet Take 1 tablet (5 mg) by mouth daily Get an INR on Friday 5/27 and then follow instructions by your coumadin clinic 30 tablet 3      zolpidem (AMBIEN) 5 MG tablet Take 5 mg by mouth nightly as needed for sleep          Allergies   No Known Allergies    Physical Exam   Vital Signs:                    Weight: 0 lbs 0 oz    General appearance: awake alert in  no apparent distress     HEENT: EOMI and PEARLA, sclera nonicteric, moist mucus membranes, head atraumatic  NECK: supple  RESPIRATORY: lungs are clear  CARDIOVASCULAR: normal S1S2 regular sounds of LVAD , wounds look good   GASTROINTESTINAL: abdomen is  soft,  non-distended, non-tender with normal bowel sounds.    MUSCULOSKELETAL: normal muscle bulk and tone  SKIN: warm and dry, no rashes, no mottling noted   NEUROLOGIC: awake alert and oriented, no focal deficits found,  EXTREMITIES: no clubbing cyanosis or edema noted  moves all extremities        Data   Results for orders placed or performed during the hospital encounter of 06/17/22 (from the past 24 hour(s))   CTA Chest with Contrast    Impression    RESIDENT PRELIMINARY INTERPRETATION  Impression:  1. The LVAD outflow graft  is widely patent throughout its course.  2. No substantial change in moderate sized hemopericardium.   Asymptomatic COVID-19 Virus (Coronavirus) by PCR Nasopharyngeal    Specimen: Nasopharyngeal; Swab   Result Value Ref Range    SARS CoV2 PCR Negative Negative    Narrative    Testing was performed using the Xpert Xpress SARS-CoV-2 Assay on the  Cepheid Gene-Xpert Instrument Systems. Additional information about  this Emergency Use Authorization (EUA) assay can be found via the Lab  Guide. This test should be ordered for the detection of SARS-CoV-2 in  individuals who meet SARS-CoV-2 clinical and/or epidemiological  criteria. Test performance is unknown in asymptomatic patients. This  test is for in vitro diagnostic use under the FDA EUA for  laboratories certified under CLIA to perform high complexity testing.  This test has not been FDA cleared or approved. A negative result  does not rule out the presence of PCR inhibitors in the specimen or  target RNA in concentration below the limit of detection for the  assay. The possibility of a false negative should be considered if  the patient's recent exposure or clinical presentation suggests  COVID-19. This test was validated by the [facility]. This laboratory is certified under  the Clinical Laboratory Improvement Amendments of 1988 (CLIA-88) as  qualified to perform high complexity laboratory testing.     Comprehensive metabolic panel   Result Value Ref Range    Sodium 136 136 - 145 mmol/L    Potassium 4.1 3.4 - 5.3 mmol/L    Creatinine 0.53 (L) 0.67 - 1.17 mg/dL    Urea Nitrogen 12.1 8.0 - 23.0 mg/dL    Chloride 104 98 - 107 mmol/L    Carbon Dioxide (CO2) 24 22 - 29 mmol/L    Anion Gap 8 7 - 15 mmol/L    Glucose 90 70 - 99 mg/dL    Calcium 9.1 8.8 - 10.2 mg/dL    Protein Total 6.7 6.4 - 8.3 g/dL    Albumin 3.2 (L) 3.5 - 5.2 g/dL    Bilirubin Total 0.3 <=1.2 mg/dL    Alkaline Phosphatase 146 (H) 40 - 129 U/L    AST 24  10 - 50 U/L    ALT 13 10 - 50 U/L    GFR Estimate >90 >60 mL/min/1.73m2   INR   Result Value Ref Range    INR 2.44 (H) 0.85 - 1.15   Ionized Calcium   Result Value Ref Range    Calcium Ionized 4.9 4.4 - 5.2 mg/dL   Lactic acid whole blood   Result Value Ref Range    Lactic Acid 0.8 0.7 - 2.0 mmol/L   Lactate Dehydrogenase   Result Value Ref Range    Lactate Dehydrogenase 263 (H) 0 - 250 U/L   CBC with platelets   Result Value Ref Range    WBC Count 6.0 4.0 - 11.0 10e3/uL    RBC Count 3.29 (L) 4.40 - 5.90 10e6/uL    Hemoglobin 9.5 (L) 13.3 - 17.7 g/dL    Hematocrit 31.5 (L) 40.0 - 53.0 %    MCV 96 78 - 100 fL    MCH 28.9 26.5 - 33.0 pg    MCHC 30.2 (L) 31.5 - 36.5 g/dL    RDW 16.9 (H) 10.0 - 15.0 %    Platelet Count 243 150 - 450 10e3/uL   Magnesium   Result Value Ref Range    Magnesium 1.7 1.7 - 2.3 mg/dL   Phosphorus Lab   Result Value Ref Range    Phosphorus 4.3 2.5 - 4.5 mg/dL     Most Recent 3 CBC's:Recent Labs   Lab Test 08/21/22  0510 08/20/22  0520 08/19/22  0642   WBC 6.0 6.1 5.9   HGB 9.5* 9.8* 9.9*  9.9*   MCV 96 95 94    262 242     Most Recent 3 BMP's:Recent Labs   Lab Test 08/21/22  0510 08/20/22  0520 08/19/22  0642    139 138   POTASSIUM 4.1 4.4 3.8   CHLORIDE 104 106 104   CO2 24 24 26   BUN 12.1 11.4 12.9   CR 0.53* 0.60* 0.55*   ANIONGAP 8 9 8   ELDA 9.1 9.4 9.1   GLC 90 88 99     Most Recent 2 LFT's:Recent Labs   Lab Test 08/21/22  0510 08/20/22  0520   AST 24 25   ALT 13 12   ALKPHOS 146* 142*   BILITOTAL 0.3 0.4     Most Recent 3 INR's:Recent Labs   Lab Test 08/21/22  0510 08/20/22  0520 08/19/22  0642   INR 2.44* 2.59* 2.28*

## 2022-08-21 NOTE — PHARMACY-ANTICOAGULATION SERVICE
Clinical Pharmacy - Warfarin Dosing Consult     Pharmacy has been consulted to manage this patient s warfarin therapy.    Pharmacy has been consulted to manage this patient s warfarin therapy.  Indication: LVAD/RVAD  Therapy Goal: INR 2-3  Warfarin PTA Regimen: 7.5mg MThS, 5mg ROW  Significant drug interactions: Aspirin, Plavix, heparin     INR   Date Value Ref Range Status   08/21/2022 2.44 (H) 0.85 - 1.15 Final   08/20/2022 2.59 (H) 0.85 - 1.15 Final       Recommend warfarin 5 mg today.  Pharmacy will monitor Dandy Sands daily and order warfarin doses to achieve specified goal.      Please contact pharmacy as soon as possible if the warfarin needs to be held for a procedure or if the warfarin goals change.      Tima Cowart, PharmD  PGY1 Pharmacist Resident

## 2022-08-21 NOTE — LETTER
Mechanical Circulatory Support Program  [address], [facility] [address] [place] [address]  [phone] Office Phone  [phone] Fax Number  Faxed to:  Geraldo  Fax Number:  [phone]       [address], [address]  [place], [place] [address]  [phone] Office  [phone] Fax Hospital Name     [facility]        Prescription Information      Patient Information   Dandy Sands                                                                                 Address                                          City, [facility]                                ZIP Code                                                         Patient Phone  Other/Cell Phone         Patient Allergies         Authorized Prescriber Information  [facility] VAD Coordinator   Kelly Brasher, Macy Greer, Daphne Villar, Anthony Fonseca, Meredith Long, Verna Cho, Alia Litltejohn, Abiola Palmer   Name    [address] [facility]  Name    [address] [place] [address]   [place]            [address]  [place]                     [address] [place] [address]    [phone]  City, State                                 Zip    [phone]            Phone                                Fax                                               [phone]  Phone                                           Fax                                                                                              [email or web address],  [email or web address]  [email or web address]  [email or web address]   License #                         NPI#  Email                                                                                  Diagnosis Code  Items  Prescribed - Per Month Product # Quantity (Please Select Size/Quantities)   Z95.811      Z48.01  I42.8        Z48.812  Gloves - Non-Sterile S   M   L   XL Any Size 1 box    X Gloves - Sterile Sz Any Size 1 box   Implant Date X Sterile Gauze Sponges 2x2 and/ or 4x4 Box Any Size 6 boxes     7/8/22    Drain Sponges 2x2 and/ or 4x4 Box Any Size 6 boxes     Masks Any 2 boxes   Discharge Date  Betadine Swabs Any 3 swabs/day (30)   Expected 8/31/22 X Medipore Tape 2  and/ or 4  Rolls  10 rolls     X Barrier Film 3.0ML Each 3345 30 each   Length of need X [employer]urion anchor device [id number] 6 each    X  [place] daily Each [id number] 30 each    X  [employer] [facility] [place] weekly Kit   30 each   12 months X Adhesive Remover  Any  1 box    X Aquaguard Any 3 packages (1 package contains 7)    X Doppler/Cuff/Gel   1    X Silverlon 1.5cm Square disc EIQR71-06 Up to 30                     As the referring provider, I certify that I am initiating the above prescribed order for LVAD accessories and/or stabilization supplies as a medically necessary part of my overall treatment plan for my patient who is identified by name on this form.  In my opinion, these products are reasonable and necessary in reference to the accepted standards of medical practice in the treatment of this patient's condition and are not prescribed as convenience items.  I also certify that I have discussed potential treatment options with my patient.  I have advised my patient that he/she has a right to choose the durable medical equipment (DME) provider that provides LVAD accessories and/or stabilization supplies pursuant to this order.  My patient has consented to be contacted by [facility].     PrescriDate __01/05/23________                                      Signature must be hand written. No stamps allowed - Medicare & Medicaid permit prescriber signatures.    Printed Prescriber Name____Kelly Lora_______________NPI # [id number]

## 2022-08-21 NOTE — PROGRESS NOTES
DISCHARGE   Discharged to:  [facility]  Via: EMS  Accompanied by: EMS staff  Discharge Instructions: diet, activity, medications, follow up appointments, when to call the MD, and what to watchout for (i.e. s/s of infection, increasing SOB, palpitations, chest pain, preventing falls)  Prescriptions: medication list reviewed & sent with pt  Follow Up Appointments: arranged; information given  Belongings: All sent with pt  IV: Per Cards 2, keep PICC in for now  Telemetry: off  Pt exhibits understanding of above discharge instructions; all questions answered.  Discharge Paperwork: faxed    RN to RN report called to Mathieu at 1100.

## 2022-08-21 NOTE — PLAN OF CARE
Goal Outcome Evaluation:  Pt arrived to the unit at about 1200 and on a stretcher and admitted to room 542. He needed minimal assist of one person, touching assistance to get in bed from a stretcher. He was alert and oriented x 4, denied pain or discomfort when arrived.LVAD dressing was intact and LVAD reading was within normal limit. He was oriented to the room, call light system, bed control, meal times and routines of the unit. He needs PTA and skin assessment over the next 24 hrs while assisting with activity of daily livings.

## 2022-08-21 NOTE — H&P
[facility]   Acute Rehabilitation Unit  Admission History and Physical    CHIEF COMPLAINT   Cardiac; s/p HM3 LVAD      HISTORY OF PRESENT ILLNESS  Dandy Sands is a 61 year old right hand dominant male with past medical history of SLE, antiphospholipid syndrome, history pulmonary embolism (on anticoagulation with warfarin), HFrEF due to IMC, HDL. He initially presented to [facility] on 6/13 due to nausea, vomiting, abdominal bloating and transferred to [facility] for admission on 6/17/2022 for decompensated heart failure.  Noted to be in cardiogenic shock c/b multiorgan failure requiring mechanical support with a balloon pump (IABP).  He is currently S/P HM 3 LVAD on 7/8/2022.  His stay was also complicated by RV failure, had 29F Protek duo via RIJ, RVAD removed 7/21, hemopericardium requiring repeat washout, chest was closed 7/13.  Other notable complications significant epistaxis on 8/6 night which was packed by ENT. Patient has h/o nasal perforation that required elective procedure (in [place]) which was postponed due to LVAD insertion.  He required intubation 8/7 for airway protection, was extubated 8/8.  Nasal pack removed 8/9.    During acute hospitalization, patient was seen and evaluated by PT and OT, who collectively recommended that patient would benefit from ongoing therapies in the acute inpatient rehabilitation setting.      In review of the therapy notes  He received PT and OT services.  Currently is at set up assist is required for LVAD management during mobility. Mod I supine <> sit. STS completed with Wiley w/in precautions. Pt is able to ambulate to/from bathroom with CGA-min A using walker. Ambulation distance is greatly limited by fatigue.  Pt requires close chair follow with all ambulation as he takes multiple seated rest breaks. The greatest distance he can ambulate in a single bout is 25'. Using urinal with set up. Able to ambulate to the  bathroom and complete toileting with Ax1. Dressing and grooming require set up assist.     Upon presenting to the unit today he endorses poor night of sleep and fatigue at the moment feels was a little anxious coming in. But otherwise feels he is doing well, since LVAD placement.  Denies any pain from central sternal incision, denies any epistaxis since removal of nasal pack.  He would like to focus on strengthening so he is able to walk.  Explains he has to go to an apartment for 1 month prior to him going home.  But is looking forward to going home thinks this is a good step forward.    Denies headache, fever, chills, chest pain, shortness of breath, abdominal discomfort, dysuria, or flank pain.    PAST MEDICAL HISTORY   Reviewed and updated in Epic.  Past Medical History:   Diagnosis Date     Antiphospholipid antibody syndrome (H)      CAD (coronary artery disease)      Chronic systolic heart failure (H)      Ischemic cardiomyopathy      Mitral regurgitation      Systemic lupus erythematosus (H)        SURGICAL HISTORY  Reviewed and updated in Epic.  Past Surgical History:   Procedure Laterality Date     COLONOSCOPY N/A 05/23/2022    Procedure: COLONOSCOPY;  Surgeon: Parth Meneses MD;  Location: [facility] OR     CV CORONARY ANGIOGRAM N/A 05/24/2022    Procedure: Coronary Angiogram;  Surgeon: Mike Pope MD;  Location: [facility] CARDIAC CATH LAB     CV CORONARY ANGIOGRAM N/A 05/29/2022    Procedure: Coronary Angiogram;  Surgeon: Austin Bright MD;  Location: [facility] CARDIAC CATH LAB     CV CORONARY LITHOTRIPSY PCI Bilateral 05/24/2022    Procedure: Percutaneous Coronary Intervention - Lithotripsy;  Surgeon: Mike Pope MD;  Location: [facility] CARDIAC CATH LAB     CV INTRA AORTIC BALLOON N/A 06/17/2022    Procedure: Intraprocedure Aortic Balloon Pump Insertion;  Surgeon: Sherman Cerda MD;  Location: [facility] CARDIAC CATH LAB     CV INTRA AORTIC BALLOON Right 07/03/2022    Procedure:  Reposition of Subclavian Intraprocedure Aortic Balloon Pump;  Surgeon: Austin Bright MD;  Location: [facility] CARDIAC CATH LAB     CV INTRAVASULAR ULTRASOUND N/A 05/24/2022    Procedure: Intravascular Ultrasound;  Surgeon: Mike Pope MD;  Location: [facility] CARDIAC CATH LAB     CV PCI ANGIOPLASTY N/A 05/29/2022    Procedure: Percutaneous Transluminal Angioplasty;  Surgeon: Austin Bright MD;  Location: [facility] CARDIAC CATH LAB     CV PCI STENT DRUG ELUTING N/A 05/24/2022    Procedure: Percutaneous Coronary Intervention Stent;  Surgeon: Mike Pope MD;  Location:  HEART CARDIAC CATH LAB     CV RIGHT HEART CATH MEASUREMENTS RECORDED N/A 05/18/2022    Procedure: Right Heart Catheterization;  Surgeon: Justo Stewart MD;  Location: [facility] CARDIAC CATH LAB     CV RIGHT HEART CATH MEASUREMENTS RECORDED N/A 05/24/2022    Procedure: Right Heart Catheterization;  Surgeon: Mike Pope MD;  Location:  HEART CARDIAC CATH LAB     CV RIGHT HEART CATH MEASUREMENTS RECORDED N/A 05/29/2022    Procedure: Right Heart Catheterization;  Surgeon: Austin Bright MD;  Location: [facility] CARDIAC CATH LAB     CV RIGHT HEART CATH MEASUREMENTS RECORDED N/A 07/01/2022    Procedure: Right Heart Catheterization;  Surgeon: Mike Pope MD;  Location: [facility] CARDIAC CATH LAB     CV RIGHT HEART EXERCISE STRESS STUDY N/A 05/18/2022    Procedure: Stress Drug Study;  Surgeon: Justo Stewart MD;  Location: [facility] CARDIAC CATH LAB     CV SWAN CHOCO PROCEDURE N/A 06/17/2022    Procedure: [place] Choco Procedure;  Surgeon: Sherman Cerda MD;  Location: [facility] CARDIAC CATH LAB     INCISION AND DRAINAGE CHEST WASHOUT, COMBINED N/A 07/11/2022    Procedure: Chest washout and closure;  Surgeon: Darnell Morgan MD;  Location:  OR     INCISION AND DRAINAGE CHEST WASHOUT, COMBINED N/A 07/12/2022    Procedure: INCISION AND DRAINAGE, WOUND, CHEST, WITH IRRIGATION;  Surgeon: Darius Zamora MD;  Location:   OR     INSERT ARTERIAL LINE  07/18/2022          INSERT INTRAAORTIC BALLOON PUMP Right 06/23/2022    Procedure: INSERTION, INTRA-AORTIC BALLOON PUMP RIGHT SUBCLAVIAN ARTERY.;  Surgeon: Hayden Veras MD;  Location: [facility] OR     INSERT VENTRICULAR ASSIST DEVICE LEFT (HEARTMATE II/III) MINIMALLY INVASIVE N/A 07/08/2022    Procedure: MEDIAN STERNOTOMY.  CARDIOPULMONARY BYPASS.  INTRAOPERATIVE TRANSESOPHAGEAL ECHOCARDIOGRAM.  IMPLANT LEFT VENTRICULAR ASSIST DEVICE HEARTMATE III.  PLACEMENT OF PERCUTANEOUS RIGHT VENTRICULAR ASSIST DEVICE.  TEMPORARY CHEST CLOSURE;  Surgeon: Darius Zamora MD;  Location: [facility] OR     IR CHEST TUBE PLACEMENT NON-TUNNELED RIGHT  08/01/2022     IRRIGATION AND DEBRIDEMENT CHEST WASHOUT, COMBINED N/A 07/13/2022    Procedure: IRRIGATION AND DEBRIDEMENT, CHEST;  Surgeon: Darius Zamora MD;  Location: [facility] OR     PICC DOUBLE LUMEN PLACEMENT Right 05/28/2022    right basilic 5 fr dl picc 40 cm     PICC DOUBLE LUMEN PLACEMENT Right 08/15/2022    Right Lateral, 41 total length, 3 cm external length       SOCIAL HISTORY  Reviewed and updated in Epic.  Marital Status: Single  Living situation:    Number of Stairs, Main Entrance: 3  Stair Railings, Main Entrance: none  Transportation Anticipated: family or friend will provide  Living Environment Comments: Patient lives alone with cat in single level home, 3 [address].   Family support: Son, Son's fiancee and patient's daughter  Vocational History: Worked as .   Tobacco use: Denies current use  Alcohol use: Denies use  Illicit drug use: Denies use  Social History     Socioeconomic History     Marital status: Single     Spouse name: Not on file     Number of children: Not on file     Years of education: Not on file     Highest education level: Not on file   Occupational History     Not on file   Tobacco Use     Smoking status: Not on file     Smokeless tobacco: Not on file   Substance and Sexual Activity     Alcohol use: Not on file      Drug use: Not on file     Sexual activity: Not on file   Other Topics Concern     Not on file   Social History Narrative     Not on file     Social Determinants of Health     Financial Resource Strain: Not on file   Food Insecurity: Not on file   Transportation Needs: Not on file   Physical Activity: Not on file   Stress: Not on file   Social Connections: Not on file   Intimate Partner Violence: Not on file   Housing Stability: Not on file       FAMILY HISTORY  Reviewed and updated in Epic.  No family history on file.      PRIOR FUNCTIONAL HISTORY   Pt was independent with all ADLs/IADLs, transfers, mobility and gait.      MEDICATIONS  Scheduled meds  Medications Prior to Admission   Medication Sig Dispense Refill Last Dose     acetaminophen (TYLENOL) 325 MG tablet 3 tablets (975 mg) by Oral or Feeding Tube route every 8 hours as needed for mild pain (Use first for pain)  0      aspirin (ASA) 81 MG chewable tablet 1 tablet (81 mg) by Oral or Feeding Tube route daily  0      atorvastatin (LIPITOR) 40 MG tablet Take 40 mg by mouth daily        digoxin (LANOXIN) 125 MCG tablet Take 1 tablet (125 mcg) by mouth daily  0      ferrous sulfate (FE TABS) 325 (65 Fe) MG EC tablet Take 325 mg by mouth daily (with lunch)        hydrALAZINE (APRESOLINE) 25 MG tablet Take 1.5 tablets (37.5 mg) by mouth every 8 hours  0      hydroxychloroquine (PLAQUENIL) 200 MG tablet Take 200 mg by mouth 2 times daily        hydrOXYzine (ATARAX) 25 MG tablet Take 1 tablet (25 mg) by mouth every 6 hours as needed for anxiety (ANXIETY) 60 tablet 3      Lidocaine (LIDOCARE) 4 % Patch Place 1 patch onto the skin every 24 hours To prevent lidocaine toxicity, patient should be patch free for 12 hrs daily.  0      lisinopril (ZESTRIL) 2.5 MG tablet Take 1 tablet (2.5 mg) by mouth daily  0      LORazepam (ATIVAN) 0.5 MG tablet Take 0.5-1 mg by mouth every 4 hours as needed for anxiety        melatonin 3 MG tablet Take 6 mg by mouth nightly as needed  "for sleep        multivitamin w/minerals (THERA-VIT-M) tablet Take 1 tablet by mouth daily  0      mycophenolate (GENERIC EQUIVALENT) 500 MG tablet Take 1,500 mg by mouth 2 times daily        pantoprazole (PROTONIX) 40 MG EC tablet Take 1 tablet (40 mg) by mouth every morning (before breakfast)  0      promethazine (PHENERGAN) 12.5 MG tablet Take 1 tablet (12.5 mg) by mouth every 6 hours as needed for nausea or vomiting 30 tablet 1      QUEtiapine (SEROQUEL) 25 MG tablet Take 1 tablet (25 mg) by mouth 2 times daily  0      QUEtiapine (SEROQUEL) 50 MG tablet 3 tablets (150 mg) by Oral or Feeding Tube route At Bedtime  0      QUEtiapine (SEROQUEL) 50 MG tablet Take 1 tablet (50 mg) by mouth nightly as needed (in addition to scheduled qhs dose PRN)  0      saline nasal (AYR SALINE) GEL topical gel Apply into each nare At Bedtime  0      sertraline (ZOLOFT) 100 MG tablet Take 1 tablet (100 mg) by mouth daily  0      sodium chloride (OCEAN) 0.65 % nasal spray Spray 2 sprays into both nostrils every 4 hours  0      tamsulosin (FLOMAX) 0.4 MG capsule Take 0.4 mg by mouth daily        thiamine (B-1) 100 MG tablet Take 1 tablet (100 mg) by mouth daily 30 tablet 0      warfarin ANTICOAGULANT (COUMADIN) 5 MG tablet Take 1 tablet (5 mg) by mouth daily Get an INR on Friday 5/27 and then follow instructions by your coumadin clinic 30 tablet 3      zolpidem (AMBIEN) 5 MG tablet Take 5 mg by mouth nightly as needed for sleep          ALLERGIES   No Known Allergies      REVIEW OF SYSTEMS  A 10 point ROS was performed and negative unless otherwise noted in HPI.       PHYSICAL EXAM  VITAL SIGNS:  BP 90/72 (BP Location: Left arm)   Pulse 64   Temp (!) 95.3  F (35.2  C) (Oral)   Resp 22   Ht 1.702 m (5' 7\")   Wt 60.1 kg (132 lb 8 oz)   SpO2 97%   BMI 20.75 kg/m    BMI:  Estimated body mass index is 20.75 kg/m  as calculated from the following:    Height as of this encounter: 1.702 m (5' 7\").    Weight as of this encounter: 60.1 " kg (132 lb 8 oz).     General: Laying in bed.  No acute distress  HEENT: Pupils equal, round, and reactive to light, EOMI, NT, moist mucous membranes.  Pulmonary: Breathing comfortably on room air, clear to auscultation bilaterally  Cardiovascular: Sternal incision open to air healing well, LVAD humming on auscultation.  Abdominal: Non-tender, non-distended, bowel sounds present in all four quadrants   Extremities: warm, well perfused, no edema in bilateral lower extremities, no tenderness in calves   MSK/neuro:   Mental Status:  alert and oriented x3    Cranial Nerves: grossly normal   1. 2nd CN: Pupils equal, round, reactive to light and accomodation. and visual fields intact to confrontation.   2. 3rd,4th,6th CN:  EOMI, appropriate pupillary responses  3. 5th CN: facial sensation intact   4. 7th CN: face symmetrical   5. 8th CN: functional hearing bilaterally  6. 9th, 10th CN: palate elevates symmetrically   7. 11th CN: sternocleidomastoids and trapezii strong   8. 12th CN: tongue midline and without fasciculations     Sensory: Normal to light touch in bilateral upper and lower extremities    Strength: 5/5 in all muscle groups of the upper and lower extremities    Reflexes: Present and symmetrical bilateral upper and lower extremities.   Gonzales's test: negative bilaterally upper extremities   Babinski reflex: downgoing bilaterally lower extremities   Tone per modified Sharon Scale: Deferred   Abnormal movements: None    Coordination: No dysmetria on finger to nose b/l bilateral upper extremities   Speech: Fluent, normal volume and tone   Cognition: Intact   Gait: Deferred  Skin: Incision sites chest and upper abdomen are healing well and clean and dry.  LVAD lines intact, nontender on surrounding tissue in bandage.      LABS  Pertinent labs   Lab Results   Component Value Date    WBC 6.0 08/21/2022     Lab Results   Component Value Date    RBC 3.29 08/21/2022     Lab Results   Component Value Date    HGB 9.5  08/21/2022     Lab Results   Component Value Date    HCT 31.5 08/21/2022     Lab Results   Component Value Date    MCV 96 08/21/2022     Lab Results   Component Value Date    MCH 28.9 08/21/2022     Lab Results   Component Value Date    MCHC 30.2 08/21/2022     Lab Results   Component Value Date    RDW 16.9 08/21/2022     Lab Results   Component Value Date     08/21/2022     Last Comprehensive Metabolic Panel:  Sodium   Date Value Ref Range Status   08/21/2022 136 136 - 145 mmol/L Final     Potassium   Date Value Ref Range Status   08/21/2022 4.1 3.4 - 5.3 mmol/L Final   07/09/2022 5.2 3.4 - 5.3 mmol/L Final     Potassium POCT   Date Value Ref Range Status   07/13/2022 4.0 3.5 - 5.0 mmol/L Final     Chloride   Date Value Ref Range Status   08/21/2022 104 98 - 107 mmol/L Final   07/04/2022 106 94 - 109 mmol/L Final     Carbon Dioxide (CO2)   Date Value Ref Range Status   08/21/2022 24 22 - 29 mmol/L Final   07/04/2022 24 20 - 32 mmol/L Final     Anion Gap   Date Value Ref Range Status   08/21/2022 8 7 - 15 mmol/L Final   07/04/2022 4 3 - 14 mmol/L Final     Glucose   Date Value Ref Range Status   08/21/2022 90 70 - 99 mg/dL Final   07/04/2022 116 (H) 70 - 99 mg/dL Final     GLUCOSE BY METER POCT   Date Value Ref Range Status   08/11/2022 105 (H) 70 - 99 mg/dL Final     Urea Nitrogen   Date Value Ref Range Status   08/21/2022 12.1 8.0 - 23.0 mg/dL Final   07/04/2022 13 7 - 30 mg/dL Final     Creatinine   Date Value Ref Range Status   08/21/2022 0.53 (L) 0.67 - 1.17 mg/dL Final     GFR Estimate   Date Value Ref Range Status   08/21/2022 >90 >60 mL/min/1.73m2 Final     Comment:     Effective December 21, 2021 eGFRcr in adults is calculated using the 2021 CKD-EPI creatinine equation which includes age and gender (Mar weston al., NEJM, DOI: 10.1056/QOFDfp6812099)     Calcium   Date Value Ref Range Status   08/21/2022 9.1 8.8 - 10.2 mg/dL Final     IMPRESSION/PLAN:  Dandy Sands is a 61 year old right hand dominant  male with past medical history of SLE, antiphospholipid syndrome, history pulmonary embolism (on anticoagulation with warfarin), HFrEF due to IMC, HDL. He initially presented to [facility] on 6/13 due to nausea, vomiting, abdominal bloating and transferred to [facility] for admission on 6/17/2022 for decompensated heart failure 2/2 cardiogenic shock c/b multiorgan failure requiring IABP, is s/p HM 3 LVAD on 7/8/2022. Stay was also c/b RV, had 29F Protek duo via RIJ, RVAD removed 7/21, hemopericardium requiring repeat washout, chest was closed 7/13. Other complication epistaxis on 8/6 ENT. Patient has h/o nasal perforation that required elective procedure (in [place]) which was postponed due to LVAD insertion.  He required intubation 8/7 for airway protection, was extubated 8/8.  Nasal pack removed 8/9.    Admission to acute inpatient rehab: Cardiac; s/p HM3 LVAD.    Impairment group code: 09      1. PT, OT 90 minutes of each on a daily basis, in addition to rehab nursing and close management of physiatrist.      2. Impairment of ADL's: Impairment in strength, endurance, and ROM resulting in functional limitations in patient's ability to perform ADLs.    3. Impairment of mobility:  Impairment in strength, endurance, and ROM resulting in functional limitations in patient's ability to perform functional mobility.    4. Impairment of cognition/language/swallow:  N/A    5. Medical Conditions     #HFrEF 2/2 ICM s/p HM3 LVAD in setting of cardiogenic shock (SCAI D)  #RV failure s/p Protek duo temporary RVAD s/p decannulation 7/21  #RV thrombus  #Post chest closure hemopericardium s/p repeat washout (7/12)  #PMH CAD s/p PCI to LAD (2005)  #S/p CRT-D (2006)  Patient with ICM s/p HM3 LVAD 7/8/22 2/2 cardiogenic shock requiring MCS with IABP as prior to HM (initially evaluated for heart transplant, however noted to have substantially elevated DSAs during course of care, so decision made to proceed with LVAD given  patient was already on temporary MCS). Intraoperative course during LVAD placement was c/b RVF resulting in cardiogenic shock requiring high dose pressors necessitating temporary RVAD support. Initial post-op course c/b hemopericardium requiring repeat washout with chest left open, with good recovery extubation and decannulation on 7/21. Patient tolerating hemodynamics and end organ standpoint well. He has had intermittent low flow alarms with high PI, no power spike, and a closed aortic valve on TTE. TTE also showed underfilling LV which could explain his low flow alarms d/t suckdown event so his LVAD speed was reduced to 5200. Patient did have a low flow alarm on 8/20 @ 1145 (PI 8-9 and not hypotensive); CTA chest done which showed that the LVAD outflow tract is widely patent. Patient is euvolemic on exam, but at times has had orthostasis symptoms; patient encouraged to stay hydrated within his fluid/diet restriction guidelines (no more than 2L/day of fluid intake and no more than 3g of Na per day in dietary intake).  P:  - LVAD:  - interrogation showed 1 low flow alarm in last 24 hours as described above  - Is euvolemic 8/21  - Diuretics: Not needed at this time; monitor volume status and weight and consider starting low dose Lasix if he gains weight or starts showing signs of hypervolemia  - Afterload reduction: MAP goal 65-80; Hydralazine 37.5 TID + Lisinopril 2.5 qday  - Statin: Atorvastatin 40mg  - BB: Holding in setting of recent cardiogenic shock and orthostatic symptoms, consider restarting outpatient  - AA: Holding in setting of recent cardiogenic shock and orthostatic symptoms, consider restarting outpatient  - SGLTi: As outpatient, has been held due to immunosuppression he is on for his SLE.  - Warfarin for INR goal 2-3  - ASA 81 mg qday     #Epistaxis, resolved  #Intubated due to Concern for airway protection (8/7)-Extubated (8/8)  #Mild-moderate emphysema  #History of COVID-19  #Iatrogenic R apical  PTX  H/o nasal perforation  For elective procedure (in [place]), postponed 2/2 LVAD insertion. Epistaxis not fixed by packing overnight on 8/6 and pt continued to bleed. Intubated for airway protection. Controlled at bedside by ENT s/p cautery and packing. Extubated 8/8. Had Cefepime/Vanc empirically for broad coverage for PNA (8/7-8/12). Patient removed his own nasal packing overnight on 8/9. ENT no longer following.    - C/t Ayr nasal saline gel at bedtime + nasal saline q4H  -  followup with his ENT as OP (see below)    #Insomnia/Delirium, resolved  Some concern that he had a fall overnight on 8/6. Discussed with nursing staff and he did NOT have a fall, although he did try to get out of bed. CT head was obtained (8/7) and it was unremarkable. Patient had some issues with delirium and insomnia during hospitalization and psychiatry was consulted who recommended  - Sertraline and Quetiapine per psych recs     #GERD  #Congestive hepatopathy in the setting of cardiac insuffiency - Resolved  #Hematemesis / oral mucosal bleeding -resolved    #Dysphagia  GI consulted 7/31 for black tarry stools and a possible GI bleed, however it is more likely swallowed blood from epistaxis that patient previously had. GI did not recommend an upper endoscopy. Recurrence of bloody stools likely due to epistaxis which have resolved. Hemoglobin was monitored and continued to be stable throughout the rest of the hospitalization. Cleared for regular diet by SLP.     #Anemia due to blood loss from Epistaxis-Resolved  #History of DVT and PE   #Antiphospholipid antibody syndrome  #History of iron deficiency anemia  As noted above, likely related to epistaxis. After above interventions were complete and management was initiated per ENT recs, hemoglobin remained stable and no further evidence of bleeding.     #SLE  - PTA meds: hydroxychloroquine 200mg, resumed 7/16  - Restart PTA Cellcept at discharge     #Oral thrush, resolved  Completed 14  days of nystatin        6. Adjustment to disability:  Clinical psychology to eval and treat as needed  7. FEN: Cardiac diet, regular with thins  8. Bowel: As needed MiraLAX and Senokot  9. Bladder: Pending review  10. DVT Prophylaxis: Warfarin goal 2-3 for LVAD, daily INR, and ASA 81  11. GI Prophylaxis: Pantoprazole 40 mg daily  12. Code: Full  13. Disposition: Home with homecare and Home with outpatient therapy  14. ELOS: 12 to 14 days.  15. Rehab prognosis: Fair   16. Follow up Appointments on Discharge:   -PCP follow-up 2 to 3 weeks : Requires CBC BMP  -Follow-up with [facility] at next available appointment and follow-up with your cardiologist in the next few months at next available appointment   - Follow-up with psychiatry post discharge  - Followup with his ENT in [place] to rediscuss possible procedure for his nasal perforation vs. conservative management    Seen and discussed with Dr. Terry, PM&R staff physician     Padmini Linda MD  Resident physician -PGY-2  PM&R Department      PM&R H&P      Patient seen and examined, while lying down in bed.  He is a 61-year-old gentleman with past medical history significant for HFrEF 2/2 ICM, HTN, HLD, lupus, and antiphospholipid syndrome.     He presented with cardiogenic shock complicated by multiorgan failure requiring mechanical support with a balloon pump.  He underwent HeartMate 3 LVAD on 8 July by Dr. Faye.  Postoperative course complicated by nasal perforation.  CT scan of the chest has shown that the LVAD outflow tract is widely patent.  He progressed to being euvolemic, continues to have some orthostasis symptoms.     Other concerns include anemia due to blood loss, history of DVT/PE, antiphospholipid antibody syndrome, history of iron deficiency anemia, congestive hepatopathy due to cardiac insufficiency.     Review of the systems is consistent with fatigue, pain in the lower back, not very restful sleeping patterns, loss of weight about 50  pounds, feeling dizzy and lightheaded when standing.  Per nursing report he did have a fall 2 days ago.  He has had a bowel movement today  Denies any problems with urination.     On examination  He is alert and oriented x3,  Speech is clear  Comprehension is intact  He has generalized atrophy  All muscle groups are antigravity strength  Sensory exam is intact     Assessment and plan:  Initiate physical therapy and occupational therapy a at 90 minutes daily in each discipline working on transfer training, mobility with an assistive device.  Closely monitor his vitals during the therapy sessions.  He will benefit from having bed alarm in place given the increased risk for falls.  History of epistaxis we will continue closely monitoring the hemoglobin.  History of mild to moderate emphysema; is currently not on oxygen supplementation but on room air.  Will closely monitor.     Reviewed the chart and hemoglobin electrolytes are stable creatinine stable.     Expected length of stay is about 12 to 14 days with the plan to discharge home to the [place] apartment with his wife and daughter.     Total of 70 minutes spent this encounter of which more than 50% was in counseling and coordination.

## 2022-08-21 NOTE — PLAN OF CARE
Physical Therapy Discharge Summary    Reason for therapy discharge:    Discharged to acute rehabilitation facility.    Progress towards therapy goal(s). See goals on Care Plan in [facility] electronic health record for goal details.  Goals partially met.  Barriers to achieving goals:   discharge from facility.    Therapy recommendation(s):    Continued therapy is recommended.  Rationale/Recommendations:  [facility].

## 2022-08-21 NOTE — PHARMACY-ADMISSION MEDICATION HISTORY
Please see Admission Medication History note completed?by pharmacist?on 6/17/22 under previous encounter at [facility] for information regarding prior to admission medications.    Tima Cowart, PharmD  PGY1 Pharmacist Resident

## 2022-08-21 NOTE — PROGRESS NOTES
Shift [id number]   ?  A/I : Monitored vitals and assessed pt status. A0x4, calls appropriately, but bed alarm and chair alarm on for safety. Vitals stable on RA, . Normal sinus rhythm, occasionally ST in 100s. Afebrile. Urinating adequately via urinal or commode. Last bowel movement yesterday evening. Fair appetite on 3 gm Na with 2 L FR. Denies chest pain, palpitations, shortness of breath, nausea. Does endorses some dizziness with standing. Up with assist of 1 with GB and walker. Denies back pain this morning. Sternal and old CT sites healing, driveline covered? R PICC in place, Cards 2 contacted and would like PICC to remain in place with transfer to ARU.    Changed:  Running:   PRN:   ?  P: Continue to monitor Pt status and report changes to Cards 2.

## 2022-08-21 NOTE — DISCHARGE SUMMARY
[facility]  Discharge Summary - Medicine & Pediatrics       Date of Admission:  6/17/2022  Date of Discharge:  8/21/2022 11:30 AM  Discharging Provider: Dr. Spencer Mehta  Discharge Service: Cardiology 2 Service  Discharge Diagnoses    HFrEF 2/2 ICM s/p HM3 LVAD placement in the setting of cardiogenic shock  RV failure s/p temporary RVAD that has been removed now  RV thrombus  Epistaxis, resolved  Mild-to-moderate emphysema  Insomnia/delirium, resolved  GERD  Congestive hepatopathy  Hematemesis, resolved        Follow-ups Needed After Discharge   Follow-up Appointments     Follow Up and recommended labs and tests      - Follow up with primary care provider in 2-3 weeks.  The following   labs/tests are recommended: BMP, CBC.  - Follow up with [facility] clinic at next available appointment and followup   with your cardiologist in next few months (at next available appointment)               Unresulted Labs Ordered in the Past 30 Days of this Admission: None       Discharge Disposition   Discharged to [facility]  Condition at discharge: Stable    Hospital Course   Dandy Sands was admitted on 6/17/2022 after presenting with cardiogenic shock c/b multiorgan failure requiring mechanical support with a balloon pump in the background of past medical history of HFrEF 2/2 ICM, HTN, HLD, lupus, and antiphospholipid syndrome.  The following problems were addressed during his hospitalization:    #HFrEF 2/2 ICM s/p HM3 LVAD in setting of cardiogenic shock (SCAI D)  #RV failure s/p Protek duo temporary RVAD s/p decannulation 7/21  #RV thrombus  #Post chest closure hemopericardium s/p repeat washout (7/12)  #PMH CAD s/p PCI to LAD (2005)  #S/p CRT-D (2006)  Patient with ICM s/p HM3 LVAD 7/8/22 by Dr. Zamora in setting of presentation for cardiogenic shock requiring MCS with IABP as prior to HM (initially evaluated for heart transplant, however noted to have  substantially elevated DSAs during course of care, so decision made to proceed with LVAD given patient was already on temporary MCS). Intraoperative course during LVAD placement was complicated by RV failure resulting in cardiogenic shock requiring high dose pressors necessitating temporary RVAD support. Initial post-op course c/b hemopericardium requiring repeat washout with chest left open, however subsequently patient recovered well with decannulation on 7/21 with extubation the day prior. Patient has continued to tolerate well from hemodynamic and end organ standpoint. He has had intermittent low flow alarms with high PI, no power spike, and a closed aortic valve on TTE. TTE also showed underfilling LV which could explain his low flow alarms d/t suckdown event so his LVAD speed was reduced to 5200. Patient did have a low flow alarm on 8/20 @ 1145 (PI 8-9 and not hypotensive); CTA chest done which showed that the LVAD outflow tract is widely patent. Patient is euvolemic on exam, but at times has had orthostasis symptoms; patient encouraged to stay hydrated within his fluid/diet restriction guidelines (no more than 2L/day of fluid intake and no more than 3g of Na per day in dietary intake) and continue to work with PT/OT.  - LVAD:               - interrogation showed 1 low flow alarm in last 24 hours as described above  - Volume status @ time of discharge: euvolemic  - Diuretics: Not needed at this time; monitor volume status and weight and consider starting low dose Lasix if he gains weight or starts showing signs of hypervolemia  - Afterload reduction: MAP goal 65-80; Hydralazine 37.5 TID + Lisinopril 2.5 qday  - Statin: Atorvastatin 40mg  - BB: Holding in setting of recent cardiogenic shock and orthostatic symptoms, consider restarting outpatient  - AA: Holding in setting of recent cardiogenic shock and orthostatic symptoms, consider restarting outpatient  - SGLTi: As outpatient, has been held due to  immunosuppression he is on for his SLE.  - Warfarin for INR goal 2-3  - ASA 81 mg qday    #Epistaxis, resolved  #Intubated due to Concern for airway protection (8/7)-Extubated (8/8)  #Mild-moderate emphysema  #History of COVID-19  #Iatrogenic R apical PTX  H/o nasal perforation that required elective procedure (in [place]) which was postponed due to LVAD insertion. Developed epistaxis not fixed by packing overnight on 8/6 and pt continued to bleed. Intubated due to concern for ability to maintain airway from bleeding. Controlled at bedside by ENT s/p cautery and packing. Appreciate assistance. Extubated 8/8. Cefepime/Vanc empirically for broad coverage for PNA (8/7-8/12). Patient removed his own nasal packing overnight on 8/9. ENT no longer following as patient has not been having issues with epistaxis. After leaving rehab, he should followup with his ENT in [place] to rediscuss possible procedure for his nasal perforation vs. conservative management.  - C/t Ayr nasal saline gel at bedtime + nasal saline q4H    #Insomnia/Delirium, resolved  Some concern that he had a fall overnight on 8/6. Discussed with nursing staff and he did NOT have a fall, although he did try to get out of bed. CT head was obtained (8/7) and it was unremarkable. Patient had some issues with delirium and insomnia during hospitalization and psychiatry was consulted who recommended  - Sertraline and Quetiapine per psych recs    #GERD  #Congestive hepatopathy in the setting of cardiac insuffiency - Resolved  #Hematemesis / oral mucosal bleeding -resolved    #Dysphagia  GI consulted 7/31 for black tarry stools and a possible GI bleed, however it is more likely swallowed blood from epistaxis that patient previously had. GI did not recommend an upper endoscopy. Recurrence of bloody stools likely due to epistaxis which have resolved. Hemoglobin was monitored and continued to be stable throughout the rest of the hospitalization. Cleared for  regular diet by SLP.    #Anemia due to blood loss from Epistaxis-Resolved  #History of DVT and PE   #Antiphospholipid antibody syndrome  #History of iron deficiency anemia  As noted above, likely related to epistaxis. After above interventions were complete and management was initiated per ENT recs, hemoglobin remained stable and no further evidence of bleeding.    #SLE  - PTA meds: hydroxychloroquine 200mg, resumed 7/16  - Restart PTA Cellcept at discharge    #Oral thrush, resolved  Completed 14 days of nystatin    Consultations This Hospital Stay   OCCUPATIONAL THERAPY ADULT IP CONSULT  NUTRITION SERVICES ADULT IP CONSULT  PHARMACY IP CONSULT  PHARMACY IP CONSULT  NURSING TO CONSULT FOR VASCULAR ACCESS CARE IP CONSULT  CARDIOTHORACIC SURGERY ADULT IP CONSULT  PULMONARY GENERAL ADULT IP CONSULT  CARDIOTHORACIC SURGERY ADULT IP CONSULT  CARE MANAGEMENT / SOCIAL WORK IP CONSULT  ENT IP CONSULT  NURSING TO CONSULT FOR VASCULAR ACCESS CARE IP CONSULT  NURSING TO CONSULT FOR VASCULAR ACCESS CARE IP CONSULT  NURSING TO CONSULT FOR VASCULAR ACCESS CARE IP CONSULT  ONCOLOGY ADULT IP CONSULT  NURSING TO CONSULT FOR VASCULAR ACCESS CARE IP CONSULT  NUTRITION SERVICES ADULT IP CONSULT  CARDIAC REHAB IP CONSULT  PHARMACY TO DOSE VANCO  NUTRITION SERVICES ADULT IP CONSULT  NURSING TO CONSULT FOR VASCULAR ACCESS CARE IP CONSULT  PHARMACY IP CONSULT  WOUND OSTOMY CONTINENCE NURSE  IP CONSULT  PHARMACY TO DOSE VANCO  WOUND OSTOMY CONTINENCE NURSE  IP CONSULT  DENTISTRY ADULT IP CONSULT  PHARMACY IP CONSULT  PHARMACY IP CONSULT  RHEUMATOLOGY IP CONSULT  NURSING TO CONSULT FOR VASCULAR ACCESS CARE IP CONSULT  SPEECH LANGUAGE PATH ADULT IP CONSULT  PHARMACY IP CONSULT  PHARMACY IP CONSULT  NURSING TO CONSULT FOR VASCULAR ACCESS CARE IP CONSULT  PHARMACY IP CONSULT  PHARMACY IP CONSULT  PHARMACY TO DOSE WARFARIN  NURSING TO CONSULT FOR VASCULAR ACCESS CARE IP CONSULT  PHARMACY TO DOSE VANCO  GI LUMINAL ADULT IP CONSULT  INTERVENTIONAL  RADIOLOGY ADULT/PEDS IP CONSULT  PSYCHIATRY IP CONSULT  SPEECH LANGUAGE PATH ADULT IP CONSULT  INFECTIOUS DISEASE GENERAL ADULT IP CONSULT  NURSING TO CONSULT FOR VASCULAR ACCESS CARE IP CONSULT  PHARMACY TO DOSE VANCO  NURSING TO CONSULT FOR VASCULAR ACCESS CARE IP CONSULT  ENT IP CONSULT  NURSING TO CONSULT FOR VASCULAR ACCESS CARE IP CONSULT  NURSING TO CONSULT FOR VASCULAR ACCESS CARE IP CONSULT  NURSING TO CONSULT FOR VASCULAR ACCESS CARE IP CONSULT  NURSING TO CONSULT FOR VASCULAR ACCESS CARE IP CONSULT  NURSING TO CONSULT FOR VASCULAR ACCESS CARE IP CONSULT  SPEECH LANGUAGE PATH ADULT IP CONSULT  PSYCHIATRY IP CONSULT  NURSING TO CONSULT FOR VASCULAR ACCESS CARE IP CONSULT  NURSING TO CONSULT FOR VASCULAR ACCESS CARE IP CONSULT  PHARMACY IP CONSULT  PHARMACY IP CONSULT  NURSING TO CONSULT FOR VASCULAR ACCESS CARE IP CONSULT  PHARMACY TO DOSE WARFARIN  NURSING TO CONSULT FOR VASCULAR ACCESS CARE IP CONSULT  NURSING TO CONSULT FOR VASCULAR ACCESS CARE IP CONSULT  VASCULAR ACCESS FOR PICC PLACEMENT ADULT IP CONSULT  VASCULAR ACCESS FOR PICC PLACEMENT ADULT IP CONSULT  PATIENT LEARNING CENTER IP CONSULT  PHYSICAL THERAPY ADULT IP CONSULT  OCCUPATIONAL THERAPY ADULT IP CONSULT  SMOKING CESSATION PROGRAM IP CONSULT  PHYSICAL THERAPY ADULT IP CONSULT  OCCUPATIONAL THERAPY ADULT IP CONSULT  SMOKING CESSATION PROGRAM IP CONSULT    Code Status   Prior       The patient was discussed with Dr. Terry Mehta MD   PGY-3 IM  Cardiology 2 Service  [facility] [address] [place] [id number]  Phone: [phone]  ______________________________________________________________________    Physical Exam   Vital Signs: Temp: 98.2  F (36.8  C) Temp src: Oral BP: (!) 89/77 Pulse: 92   Resp: 16 SpO2: 97 % O2 Device: None (Room air)    Weight: 130 lbs 12.8 oz  GEN: Frail  HEENT: No epistaxis noted.  Pulm: both lungs were clear  Cardiac: no JVD, LVAD hum +  GI: soft, non distended  Neuro:  alert  Vascular: No lower extremity edema      Primary Care Physician   Scar Jeffrey    Discharge Orders      CARDIAC REHAB REFERRAL      Anticoagulation Clinic Referral      General info for SNF    Length of Stay Estimate: Short Term Care: Estimated # of Days <30  Condition at Discharge: Improving  Level of care:skilled   Rehabilitation Potential: Good  Admission H&P remains valid and up-to-date: Yes  Recent Chemotherapy: N/A  Use Nursing Home Standing Orders: Yes     Follow Up and recommended labs and tests    - Follow up with primary care provider in 2-3 weeks.  The following labs/tests are recommended: BMP, CBC.  - Follow up with [facility] clinic at next available appointment and followup with your cardiologist in next few months (at next available appointment)     Daily weights    Call Provider for weight gain of more than 2 pounds per day or 5 pounds per week.     Intake and output    Every shift     Activity - Up with nursing assistance     Reason for your hospital stay    You were hospitalized for severe heart failure and subsequently had a left ventricular assist device (LVAD) placed. Since then, we have had some issues with nose bleeding and a few low flow alarms from your LVAD. Imaging of your LVAD has been unremarkable and looks to be functioning appropriately. Given your dizziness when standing up, be sure to stay hydrated within your fluid/diet restriction guidelines and focus on your work with physical therapy while at rehab. The cardiology clinic will reach out to you to schedule a followup appointment; if you do not hear from them in the next 7-10 days, please give the clinic a call to schedule your appointment.     Physical Therapy Adult Consult    Evaluate and treat as clinically indicated.    Reason: generalized weakness after prolonged hospitalization     Occupational Therapy Adult Consult    Evaluate and treat as clinically indicated.    Reason:  generalized weakness after prolonged  hospitalization     Fall precautions     Diet    Follow this diet upon discharge: Orders Placed This Encounter      Fluid restriction 2000 ML FLUID      Calorie Counts      Snacks/Supplements Adult: Other; Chocolate Ensure Enlive @ breakfast and chocolate Ensure Enlive Shake @ supper; With Meals      Snacks/Supplements Adult: Other; Allow pt to go over meal restrictions as long as not going over daily restrictions.; With Meals      No Added Salt 3 Gram Sodium       Significant Results and Procedures   Most Recent 3 CBC's:  Recent Labs   Lab Test 08/21/22  0510 08/20/22  0520 08/19/22  0642   WBC 6.0 6.1 5.9   HGB 9.5* 9.8* 9.9*  9.9*   MCV 96 95 94    262 242     Most Recent 3 BMP's:  Recent Labs   Lab Test 08/21/22  0510 08/20/22  0520 08/19/22  0642    139 138   POTASSIUM 4.1 4.4 3.8   CHLORIDE 104 106 104   CO2 24 24 26   BUN 12.1 11.4 12.9   CR 0.53* 0.60* 0.55*   ANIONGAP 8 9 8   ELDA 9.1 9.4 9.1   GLC 90 88 99     Most Recent 2 LFT's:  Recent Labs   Lab Test 08/21/22  0510 08/20/22  0520   AST 24 25   ALT 13 12   ALKPHOS 146* 142*   BILITOTAL 0.3 0.4     Most Recent 3 INR's:  Recent Labs   Lab Test 08/21/22  0510 08/20/22  0520 08/19/22  0642   INR 2.44* 2.59* 2.28*     Most Recent 3 Troponin's:No lab results found.  Most Recent 3 BNP's:  Recent Labs   Lab Test 06/17/22  1746 05/27/22  2215 05/20/22  0516   NTBNPI 4,611* 2,006* 2,482*     Most Recent D-dimer:  Recent Labs   Lab Test 08/01/22  0742   DD 6.19*     Most Recent Cholesterol Panel:  Recent Labs   Lab Test 07/21/22  0331 07/11/22  0337 06/18/22  0334   CHOL  --   --  79   LDL  --   --  38   HDL  --   --  30*   TRIG 110   < > 53    < > = values in this interval not displayed.     Most Recent 6 Bacteria Isolates From Any Culture (See EPIC Reports for Culture Details):No lab results found.  Most Recent ABG:  Recent Labs   Lab Test 08/08/22  1607   PH 7.42   PO2 88   PCO2 41   HCO3 26   DARLENE 1.3     Most Recent ESR & CRP:  Recent Labs   Lab  Test 08/03/22  0606 06/19/22  1522   SED  --  39*   .00* 28.0*     Most Recent CPK:  Recent Labs   Lab Test 06/19/22  1522   CKT 37   ,   Results for orders placed or performed during the hospital encounter of 06/17/22   XR Chest Port 1 View    Narrative    EXAM: XR CHEST PORT 1 VIEW  6/17/2022 6:41 PM      HISTORY: dyspnea    COMPARISON: 5/20/2022    FINDINGS: Single view of the chest. Left chest wall cardiac device in  leads in stable position. Right arm PICC tip in the high SVC. Trachea  is midline. Mild cardiomegaly. Mild perihilar and bibasilar  interstitial and airspace opacities bilaterally. No large effusion. No  appreciable pneumothorax.      Impression    IMPRESSION:  Perihilar and bibasilar mixed opacities favored to represent mild  pulmonary edema. Infection not excluded.    I have personally reviewed the examination and initial interpretation  and I agree with the findings.    JILL CARRANZA MD         SYSTEM ID:  [id number]   XR Chest Port 1 View    Narrative    EXAM: XR Chest 1 view 6/18/2022 1:16 AM      HISTORY: dyspnea; device placement monitoring.    COMPARISON: Previous day.     TECHNIQUE: Frontal view of the chest.    FINDINGS: Right IJ Anderson-Janae catheter with tip in the main pulmonary  artery. Left chest wall ICD with leads in stable position.  Intra-aortic balloon pump with tip at level of T3-4, at the aortic  arch. Right-sided PICC with tip in the low SVC.  Trachea is midline. Cardiac and mediastinal silhouette is stable.  Decreased hazy pulmonary opacities. No pleural effusions. No  pneumothoraces.      Impression    IMPRESSION:   1. Decreased hazy pulmonary opacities.  2. Right IJ Anderson-Janae catheter with tip in the main pulmonary artery  3. Intra-aortic balloon pump with tip at the level of T3-4, at the  distalmost aortic arch.    I have personally reviewed the examination and initial interpretation  and I agree with the findings.    CONSTANTINO OWEN MD         SYSTEM ID:   [id number]   XR Chest Port 1 View    Narrative    EXAM: XR Chest 1 view 6/19/2022 12:56 AM      HISTORY: dyspnea; device placement monitoring.    COMPARISON: 6/18/2022.     TECHNIQUE: Frontal view of the chest.    FINDINGS: Right IJ [place]-Janae catheter with tip in the main pulmonary  artery. Left chest wall ICD with leads in stable position.  Intra-aortic balloon pump in similar position at the distalmost aortic  arch. Right-sided PICC with tip in the low SVC.  Trachea is midline. Cardiac and mediastinal silhouette is stable.  Similar hazy pulmonary opacities. No pleural effusions. No  pneumothoraces.      Impression    IMPRESSION:   1. Similar hazy pulmonary opacities.  2. Right IJ Rome-Janae catheter with tip in the main pulmonary artery  3. Intra-aortic balloon pump in stable position at the distalmost  aortic arch.    I have personally reviewed the examination and initial interpretation  and I agree with the findings.    CONSTANTINO OWEN MD         SYSTEM ID:  [id number]   XR Chest Port 1 View    Narrative    EXAM: XR CHEST PORT 1 VIEW  6/19/2022 10:47 AM      HISTORY: swan position after advancing    COMPARISON: 6/19/2022    FINDINGS: Single view of the chest. Rome-Janae catheter in the distal  left pulmonary artery. Intra-aortic balloon pump radiopaque marker  just above the paola. Stable position of left chest wall cardiac  device with leads in the right atrium, right ventricle, and coronary  sinus. Right arm PICC tip in the high SVC. Trachea is midline. Stable  heart size. No new focal pulmonary opacity. No effusion or  pneumothorax.      Impression    IMPRESSION:  1. Rome-Janae catheter in the distal left main pulmonary artery.  2. Intraaortic balloon pump radiopaque marker near the level of the  paola.    I have personally reviewed the examination and initial interpretation  and I agree with the findings.    CONSTANTINO OWEN MD         SYSTEM ID:  [id number]   XR Chest Port 1 View    Narrative    EXAM: XR  CHEST PORT 1 VIEW  6/19/2022 11:24 AM      HISTORY: swan reposition to 50    COMPARISON: 6/19/2022    FINDINGS: Single view of the chest. Right IJ [place]-Janae catheter tip  has been slightly retracted and remains in the mid left main pulmonary  artery. Stable right arm PICC and left chest wall cardiac device.  Intra-aortic balloon pump radiopaque marker positioned about 2.5 cm  above the paola.    Trachea is midline. Stable cardiac silhouette. Stable hazy pulmonary  opacities. No new focal consolidation. No large effusions. No  appreciable pneumothorax.      Impression    IMPRESSION:  1. Slightly retracted [place]-Janae catheter with tip in the left  pulmonary artery.  2. Intra-aortic balloon pump radiopaque marker at the distal aortic  arch.  3. Stable hazy pulmonary opacities.    I have personally reviewed the examination and initial interpretation  and I agree with the findings.    CONSTANTINO OWEN MD         SYSTEM ID:  [id number]   XR Chest Port 1 View    Narrative    EXAM: XR CHEST PORT 1 VIEW  6/19/2022 4:03 PM      HISTORY: swan reposition    COMPARISON: 6/19/2022    FINDINGS: Single view of the chest. [place]-Janae catheter tip in the  proximal left main pulmonary artery. Aortic balloon pump radiopaque  marker at the level of the paola. Stable position of left chest wall  cardiac device and right upper extremity PICC catheter. Trachea is  midline. Stable cardiac silhouette. Mild perihilar and bibasilar  interstitial opacities are unchanged. No large effusion. No  appreciable pneumothorax.      Impression    IMPRESSION:  [place]-Janae catheter tip projects over proximal left main  pulmonary artery.    I have personally reviewed the examination and initial interpretation  and I agree with the findings.    JILL CARRANZA MD         SYSTEM ID:  [id number]   XR Chest Port 1 View    Narrative    Exam: XR CHEST PORT 1 VIEW, 6/20/2022 12:47 AM    Comparison: Successful 19/22    History: swan-janae catheter, femoral  IABP    Findings:  Single portable semiupright view of the chest is obtained. Left chest  cardiac pacer device is in place, leads appear intact. Right internal  jugular [place]-Janae catheter tip is within the left pulmonary artery.  Intra-articular balloon pump is just superior to the paola, advanced  from prior study. Coronary stent.    Trachea is midline. Mediastinum is within normal limits. Heart size is  unchanged. Reduced interstitial opacities. There is no pneumothorax or  pleural effusion. The upper abdomen is unremarkable.      Impression    Impression:   1. Reduced interstitial opacities.  2. Intra-aortic balloon pump has been advanced slightly and projects  just superior to the paola.  3. [place]-Janae catheter tip is within the left pulmonary artery,  slightly retracted from prior study.    I have personally reviewed the examination and initial interpretation  and I agree with the findings.    MILES KENNY MD         SYSTEM ID:  [id number]   XR Chest Port 1 View    Narrative    Exam: XR CHEST PORT 1 VIEW, 6/21/2022 5:55 AM    Indication: swan-janae catheter, femoral IABP    Comparison: 6/20/2022    Findings:   Left chest wall cardiac device with leads intact and stable position.  Unchanged intraoperative balloon pump marker projecting over the  aortic arch. Right IJ pulmonary artery catheter tip entering the left  pulmonary artery. Coronary stent noted. Unchanged heart size. No  pneumothorax or effusions. Mildly indistinct pulmonary vasculature. No  focal consolidations.      Impression    Impression:   1. Unchanged IABP over the aortic arch and pulmonary artery catheter  tip entering the left pulmonary artery.  2. Mild interstitial edema.    I have personally reviewed the examination and initial interpretation  and I agree with the findings.    GABRIEL COURTNEY MD         SYSTEM ID:  [id number]   XR Chest Port 1 View    Narrative    Exam: XR CHEST PORT 1 VIEW, 6/22/2022 6:12 AM    Indication: swan-janae  catheter, femoral IABP    Comparison: 6/21/2022    Findings:   PICC tip obscured by the ICD and its tip but at least to the high  superior vena cava. Right IJ Da[place]hin-Janae catheter tip is in left  pulmonary artery. Implantable cardiac defibrillator and its leads are  unchanged in position. Intra-aortic balloon pump tip 2 cm above the  paola.     Heart within normal limits. Mild interstitial changes in the lung.      Impression    Impression:   1. Stable support devices  2. Stable mild interstitial pulmonary edema.    LAI HERNADEZ MD         SYSTEM ID:  [id number]   XR Chest Port 1 View    Narrative    Exam: XR CHEST PORT 1 VIEW, 6/23/2022 5:38 AM    Comparison: Chest x-ray 6/22/2022, 6/21/2022    History: swan-janae catheter, femoral IABP    Findings:  Portable AP view of the chest. Right IJ [place]hin-Janae catheter with tip in  stable position in the left pulmonary artery. Left chest wall cardiac  device with leads in stable position. Intra-aortic balloon pump marker  in stable position. Trachea is midline. Cardiomediastinal silhouette  is stable.  Coronary stent. Continued mild interstitial opacities  bilaterally. There is no pneumothorax or pleural effusion. The upper  abdomen is unremarkable. No acute osseous abnormality.       Impression    Impression:   1. Stable position of the support devices.   2. Continued mild interstitial opacities bilaterally.     I have personally reviewed the examination and initial interpretation  and I agree with the findings.    GABRIEL COURTNEY MD         SYSTEM ID:  [id number]   US Upper Ext Arterial Duplex Bilat Port    Narrative    ULTRASOUND UPPER EXTREMITY ARTERIAL DUPLEX BILATERAL  6/22/2022 11:09  AM    CLINICAL HISTORY: Subclavian balloon pump placement.     COMPARISONS: None available.    REFERRING PROVIDER: TALISHA DANIELLE    TECHNIQUE: Bilateral subclavian and axillary arteries evaluated with  grayscale, color Doppler, and spectral pulsed wave Doppler ultrasound.    FINDINGS:  Abnormal waveforms throughout.  RIGHT:       Subclavian artery, proximal: obscured       Subclavian artery, mid: 78 cm/s, 7.0 mm         Axillary artery: 72 cm/s, 5.9 mm    LEFT:       Subclavian artery, proximal: 94 cm/s, 6.6 mm       Subclavian artery, mid: 82 cm/s, 7.1 mm         Axillary artery: 109 cm/s, 6.4 mm      Impression    IMPRESSION: Right subclavian artery proximal segment obscured.  Otherwise patent bilateral subclavian and axillary arteries with  measurements as in the report.    MARINA CROSS MD         SYSTEM ID:  [id number]   POC US Guidance Needle Placement    Impression    Superficial cervical plexus, supraclavicular block, pectoralis block    XR Surgery TODD Fluoro L/T 5 Min w Stills    Narrative    This exam was marked as non-reportable because it will not be read by a   radiologist or a [place] non-radiologist provider.         XR Chest Port 1 View    Narrative    Exam: XR CHEST PORT 1 VIEW, 6/23/2022 10:15 AM    Comparison: Chest x-ray same day, 6/22/2022    History: INSERTION, INTRA-AORTIC BALLOON PUMP RIGHT SUBCLAVIAN ARTERY.    Findings:   Portable AP view of the chest. Right IJ [place]-Janae catheter with tip  projecting in the left pulmonary artery. Left chest wall cardiac  device with leads in stable position. Intra-aortic balloon pump marker  projects at the level of the [place]. Right subclavian graft. Trachea  is midline. Cardiomediastinal silhouette is stable. No change in the  mild interstitial prominence. There is no appreciable pneumothorax or  pleural effusion. The upper abdomen is unremarkable. No acute osseous  abnormality.       Impression    Impression:   1. Intra-aortic balloon pump marker projects at the level of the  [place]. New right subclavian graft.  2. No significant change in the mild interstitial edema.     I have personally reviewed the examination and initial interpretation  and I agree with the findings.    GABRIEL COURTNEY MD         SYSTEM ID:  [id number]   XR Chest Port  1 View    Narrative    Exam: XR CHEST PORT 1 VIEW, 6/23/2022 12:45 PM    Indication: post subclavian balloon pump placement    Comparison: 6/23/2022 chest x-ray at 10:06    Findings:   Portable, supine view the chest. Right IJ [place]-Janae catheter, left  chest implantable cardiac defibrillator/leads and IABP are stable in  position. Midline trachea. Stable cardiac silhouette. Low lung  volumes. No focal consolidation, significant pleural effusion or  pneumothorax. Right PICC tip is stable in position.      Impression    Impression: Stable exam and position of support devices.    I have personally reviewed the examination and initial interpretation  and I agree with the findings.    CONSTANTINO OWEN MD         SYSTEM ID:  [id number]   XR Chest Port 1 View    Narrative    Exam: XR CHEST PORT 1 VIEW, 6/23/2022 12:44 PM    Indication: PA placement    Comparison: 6/23/2022 chest x-ray at 11:18    Findings:     Portable, supine view the chest. Right IJ [place]-Janae has been advanced  with the tip now projecting over the distal right interlobar or right  lower lobe pulmonary artery. Remainder of exam and position of support  devices are stable in position.       Impression    Impression:     Right IJ [place]-Janae tip projects over distal interlobar or right lower  lobe pulmonary artery, consider slight retraction. Remainder of  examination and support devices are stable.    I have personally reviewed the examination and initial interpretation  and I agree with the findings.    GABRIEL COURTNEY MD         SYSTEM ID:  [id number]   XR Chest Port 1 View    Narrative    EXAM: XR Chest 1 view 6/23/2022 8:50 PM      HISTORY: IABP placement.    COMPARISON: Same date radiograph of the chest at 1124.     TECHNIQUE: Frontal view of the chest.    FINDINGS: Right IJ [place]-Janae catheter with tip injecting over the  main/central left left pulmonary artery. Left chest wall ICD in stable  position. Intra-aortic balloon pump with tip at level of  T5-6.  Trachea is midline. Cardiac and mediastinal silhouette is stable.  Decreased interstitial pulmonary opacities. No pleural effusions. No  pneumothoraces. Unchanged right PICC catheter      Impression    IMPRESSION:   1. Intra-aortic balloon pump with tip at the level of T5-6 about 2.4  cm below the superior margin of the ascending thoracic aorta.  2. Interval retraction of the [place]-Janae catheter with tip projecting  over the main/central left pulmonary artery artery   3. Decreased interstitial pulmonary opacities.    I have personally reviewed the examination and initial interpretation  and I agree with the findings.    JILL CARRANZA MD         SYSTEM ID:  [id number]   XR Chest Port 1 View    Addendum: 6/24/2022    The right upper extremity PICC tip is at the mid SVC, not the  subclavian vein.    YESENIA KEITH DO         SYSTEM ID:  [id number]      Narrative    EXAM: XR Chest 1 view 6/23/2022 10:44 PM      HISTORY: [place] advancement to 55 cm.    COMPARISON: Same-day radiograph at 2029.     TECHNIQUE: Frontal view of the chest.    FINDINGS: Interval advancement of right IJ [place]-Janae catheter with tip  in a left lower lobe pulmonary artery.. Left chest wall ICD in stable  position. Right-sided PICC with tip in the right subclavian vein.  Intra-aortic balloon pump with tip at the level of T5. Coronary artery  stent.  Trachea is midline. Cardiomediastinal silhouette is stable. Stable  interstitial pulmonary opacities. No pneumothoraces. No pleural  effusions.      Impression    IMPRESSION:   1. Interval advancement of the right IJ [place]-Janae catheter with tip  extending into a left lower lobe pulmonary artery branch.  2. Stable interstitial pulmonary opacities.    Finding was identified on 6/23/2022 10:44 PM.      was contacted by Dr. Martinez at 6/23/2022 10:47 PM and  verbalized understanding of the urgent finding.     I have personally reviewed the examination and initial interpretation  and I agree with  the findings.    YESENIA ARCHER SAGAR,          SYSTEM ID:  [id number]   XR Chest Port 1 View    Narrative    Exam: XR CHEST PORT 1 VIEW, 6/23/2022 10:42 PM    Comparison: 6/23/2022    History: swan retracted x2    Findings:  Single portable semiupright view of the chest is obtained. Left chest  cardiac defibrillator is in place, leads appear intact. Right internal  jugular [place]-Janae catheter tip terminates over the left lower lobar  pulmonary artery. Aortic balloon pump marker projects near the level  of the [place]. Right upper extremity PICC tip terminates within the  mid superior vena cava. Coronary stent.    Trachea is midline. Mediastinum is within normal limits. Heart size is  unchanged. Stable interstitial opacities. There is no pneumothorax or  pleural effusion. The upper abdomen is unremarkable.      Impression    Impression:   1. [place]-Janae catheter tip has been retracted slightly and terminates  in a more central left lower lobar pulmonary artery.  2. Unchanged interstitial pulmonary opacities.    I have personally reviewed the examination and initial interpretation  and I agree with the findings.    YESENIA KEITH DO         SYSTEM ID:  [id number]   XR Chest Port 1 View    Narrative    Exam: XR CHEST PORT 1 VIEW, 6/23/2022 10:44 PM    Comparison: 6/23/2022    History: swan retracted x2    Findings:  Single portable semiupright view of the chest is obtained. Left chest  cardiac defibrillator is in place, leads appear intact. Right internal  jugular [place]-Janae catheter tip terminates over the left main pulmonary  artery. Aortic balloon pump marker projects at the level of the  [place]. Right upper extremity PICC tip terminates within the mid  superior vena cava. Coronary stent.    Trachea is midline. Mediastinum is within normal limits. Heart size is  unchanged. Stable interstitial opacities. There is no pneumothorax or  pleural effusion. The upper abdomen is unremarkable.      Impression    Impression:   1.  [place]-Janae catheter tip terminates in the left main pulmonary  artery, retracted since prior.  2. Unchanged interstitial pulmonary opacities.    I have personally reviewed the examination and initial interpretation  and I agree with the findings.    YESENIA KEITH DO         SYSTEM ID:  [id number]   XR Chest Port 1 View    Narrative    Exam: XR CHEST PORT 1 VIEW, 6/24/2022 4:05 AM    Comparison: 6/23/22    History: [place] repositioned, Suboptimal IABP function    Findings:  Single portable semiupright view of the chest is obtained. Left chest  cardiac defibrillator is in place, leads appear intact. Right internal  jugular [place]-Janae catheter tip terminates over the left upper lobar  pulmonary artery. Aortic balloon pump marker projects near the level  of the [place]. Right upper extremity PICC tip terminates within the  mid superior vena cava. Coronary stent.    Trachea is midline. Mediastinum is within normal limits. Heart size is  unchanged. Stable interstitial opacities. There is no pneumothorax or  pleural effusion. The upper abdomen is unremarkable.      Impression    Impression:   1. [place]-Janae catheter tip terminates in the left upper lobar pulmonary  artery.  2. Unchanged interstitial pulmonary opacities.    I have personally reviewed the examination and initial interpretation  and I agree with the findings.    YESENIA KEITH DO         SYSTEM ID:  [id number]   XR Chest Port 1 View    Narrative    Exam: XR CHEST PORT 1 VIEW, 6/24/2022 4:10 AM    Comparison: 6/24/22    History: swan repositioned    Findings:  Single portable semiupright view of the chest is obtained. Left chest  implantable cardiac defibrillator is in place, leads appear intact.  Right internal jugular [place]-Janae catheter tip terminates over the main  pulmonary artery. Aortic balloon pump marker projects just above the  level of the [place]. Right upper extremity PICC tip terminates within  the mid superior vena cava. Coronary stent.    Trachea is  midline. Mediastinum is within normal limits. Heart size is  unchanged. Stable interstitial opacities. There is no pneumothorax or  pleural effusion. The upper abdomen is unremarkable.      Impression    Impression:   1. [place]-Janae catheter tip terminates over the main pulmonary artery,  retracted since prior.  2. Unchanged interstitial pulmonary opacities.    I have personally reviewed the examination and initial interpretation  and I agree with the findings.    YESENIA KEITH DO         SYSTEM ID:  [id number]   XR Chest Port 1 View    Narrative    Exam: XR CHEST PORT 1 VIEW, 6/24/2022 10:01 AM    Comparison: Chest x-ray same day    History: swan placement    Findings:  Portable AP view of the chest. Right IJ [place]-Janae catheter with tip  projecting over proximal right pulmonary artery. Intra-aortic balloon  pump tip marker projects just above the [place]. Right PICC line with  tip projecting over the superior vena cava. Left chest wall ICD with  leads in stable position. Trachea is midline. Cardiomediastinal  silhouette is stable. Coronary stent. Similar patchy interstitial  opacities. There is no pneumothorax or pleural effusion. The upper  abdomen is unremarkable. No acute osseous abnormality.       Impression    Impression:   1. [place]-Janae catheter tip projects over the proximal right pulmonary  artery.   2. Stable patchy interstitial opacities.     I have personally reviewed the examination and initial interpretation  and I agree with the findings.    GABRIEL COURTNEY MD         SYSTEM ID:  [id number]   XR Abdomen Port 1 View    Narrative    Exam: XR ABDOMEN PORT 1 VIEWS, 6/24/2022 1:52 PM    Indication: Looking for distal marker of balloon    Comparison: None    Findings:     Frontal view radiograph of the abdomen. Partially visualized cardiac  leads. Punctate metallic opacity projects at the level of presumed L1,  assuming the last rib bearing vertebral body to be T12.      Impression    Impression:     1. Punctate  metallic opacity projects at the level of L1, likely  representing distal IABP marker.  2. No obstructive bowel gas pattern    JILL CARRANZA MD         SYSTEM ID:  [id number]   XR Chest Port 1 View    Narrative    Exam: XR CHEST PORT 1 VIEW, 6/25/2022 4:45 AM    Comparison: 6/24/2022    History: [place] and IABP placement    Findings:  Single portable semiupright view of the chest is obtained. Left chest  cardiac device is in place, leads appear intact. Right internal  jugular [place]-Janae catheter tip terminates over the left main pulmonary  artery. Right upper extremity PICC tip terminates in the midsuperior  vena cava. Aortic balloon pump marker overlying the aortic arch.    Trachea is midline. Mediastinum is within normal limits. Heart size  unchanged Reduced patchy opacities. There is no pneumothorax or  pleural effusion. The upper abdomen is unremarkable.      Impression    Impression:   1. Reduced patchy opacities.  2. [place]-Janae catheter tip now projects over the left main pulmonary  artery, IABP overlies the aortic arch. Support devices are otherwise  unchanged.    I have personally reviewed the examination and initial interpretation  and I agree with the findings.    KHADAR CAMPOS MD         SYSTEM ID:  [id number]   XR Abdomen Port 1 View    Narrative    Exam: XR ABDOMEN PORT 1 VIEWS, 6/25/2022 4:48 AM    Indication: IABP position    Comparison: X-ray abdomen 6/24/2022    Findings:   Single portable supine view the abdomen is obtained. Nonobstructive  bowel gas pattern. Inferior vena cava filter at the level of L2-3.  Intervertebral balloon pump near T12-L1.      Impression    Impression:   1. Nonobstructive bowel gas pattern.  2. Similar position of aortic balloon pump marker and inferior vena  cava filter.    I have personally reviewed the examination and initial interpretation  and I agree with the findings.    KHADAR CAMPOS MD         SYSTEM ID:  [id number]   XR Chest Port 1 View    Narrative    XR  CHEST PORT 1 VIEW  6/25/2022 11:12 AM      HISTORY: [place] janae and balloon pump placement    COMPARISON: 6/25/2022, 6/24/2020    FINDINGS: Frontal abdominal radiograph. Stable implantable cardiac  device, epicardial pacer wires, [place]-Janae catheter, intra-arterial  balloon pump sheath, PICC line. Stable mediastinal silhouette. No  focal pulmonary opacity, pleural effusion, or pneumothorax. Bones and  upper abdomen are unremarkable.      Impression    IMPRESSION: [place]-Janae catheter tip projects over the pulmonary trunk,  IABP superior marker projects at the level of the [place]. Otherwise  stable exam.    I have personally reviewed the examination and initial interpretation  and I agree with the findings.    KHADAR CAMPOS MD         SYSTEM ID:  [id number]   XR Chest Port 1 View    Narrative    EXAM: XR CHEST PORT 1 VIEW  6/26/2022 10:33 AM     HISTORY:  IABP and swan position       COMPARISON:  Radiograph 6/25/2022    FINDINGS: Single view of the chest. Right IJ [place]-Janae catheter tip  projects over the main pulmonary trunk. Intra-aortic balloon pump  marker projects over the proximal descending thoracic aorta. Stable  Stable implantable cardiac device, epicardial pacer wires, PICC line.      Stable cardiomediastinal silhouette. No significant pleural effusion  or pneumothorax. No acute focal airspace opacity.      Impression    IMPRESSION:   Right IJ [place]-Janae catheter tip projects over the main pulmonary  trunk. Intra-aortic balloon pump marker projects over the proximal  descending thoracic aorta.    I have personally reviewed the examination and initial interpretation  and I agree with the findings.    KHADAR CAMPOS MD         SYSTEM ID:  [id number]   XR Chest Port 1 View    Narrative    Portable chest    INDICATION: Intra-aortic balloon pump position    COMPARISON: 6/26/2022    FINDINGS: Intra-aortic balloon pump marker again is at the mid aortic  knob. Heart size normal. Implantable cardiac defibrillator.  Possible  right IJ catheter in the right atrium. Left costophrenic angle  haziness.      Impression    IMPRESSION: Intra-aortic balloon pump marker at the distal aortic  arch. Small left pleural effusion. Implantable cardiac defibrillator.    CONSTANTINO OWEN MD         SYSTEM ID:  [id number]   XR Chest Port 1 View    Narrative    Exam: XR CHEST PORT 1 VIEW, 6/28/2022 6:20 AM    Indication: IABP position    Comparison: 6/27/2022    Findings:   Left chest wall cardiac device leads are intact and stable position.  Intra-aortic balloon pump marker projects over the aortic arch,  slightly more proximal than prior. Right PICC line tip difficult to  visualize the likely over high SVC. Unchanged cardiac silhouette. No  pneumothorax or effusions. Indistinct pulmonary vasculature and  increasing interstitial opacities.      Impression    Impression:   1. Intra-aortic balloon pump marker directed over the aortic arch.  2. Additional support devices as above.  3. Mild increased interstitial pulmonary edema.    I have personally reviewed the examination and initial interpretation  and I agree with the findings.    GABRIEL COURTNEY MD         SYSTEM ID:  [id number]   XR Chest Port 1 View    Narrative    XR CHEST PORT 1 VIEW  6/29/2022 4:04 AM      HISTORY: IABP position    COMPARISON: 6/28/2022    FINDINGS:   Single AP view of the chest. Left chest wall ICD with leads in similar  position. Right arm PICC tip overlying the mid SVC. Superior IABP  marker overlying the proximal descending thoracic aorta versus distal  aortic arch. Stable mediastinum. Coronary artery stent. No  pneumothorax or significant left pleural effusion. Probable trace  right pleural effusion. Increased diffuse interstitial and airspace  opacities.      Impression    IMPRESSION:   1. Superior IABP marker overlying the proximal descending thoracic  aorta versus distal aortic arch, 2.5 cm above the pa[place]. Stable  remaining devices.  2. Cardiomegaly with increased  probable pulmonary edema.  3. Trace right pleural effusion.    I have personally reviewed the examination and initial interpretation  and I agree with the findings.    YESENIA KEITH DO         SYSTEM ID:  [id number]   XR Chest Port 1 View    Narrative    EXAM: XR CHEST PORT 1 VIEW  6/29/2022 6:37 PM     HISTORY:  confirm position of the balloon pump       COMPARISON:  Previous earlier same day, 6/28/2022.    TECHNIQUE: AP view the chest in 90 degrees    FINDINGS:   Devices, lines, tubes: Intra-aortic balloon pump marker projecting  over the proximal descending thoracic aorta (left lateral to the T5  vertebral body). Left chest wall implantable cardiac defibrillator and  right upper extremity PICC are in similar positioning. Surgical clips  project over the right axilla. Coronary artery stenting.    The trachea is midline. The cardiomediastinal silhouette is stably  enlarged. No substantial change in diffuse interstitial opacities.  No  appreciable pneumothorax, pleural effusion, or focal consolidative  opacity. No acute osseous abnormality.        Impression    IMPRESSION:   1. Superior intra-aortic balloon pump marker projecting over the aorta  just above the level of the [place].  Remaining support devices are in  stable positioning.  2. Cardiomegaly with similar diffuse interstitial opacities, likely  pulmonary interstitial edema.    I have personally reviewed the examination and initial interpretation  and I agree with the findings.    SHAQ PRABHAKAR MD         SYSTEM ID:  [id number]   XR Chest Port 1 View    Narrative    Exam: XR CHEST PORT 1 VIEW, 6/30/2022 5:38 AM    Indication: IABP position    Comparison: 6/29/2022 at 6:37 PM    Findings:   Unchanged intra-aortic balloon pump marker projecting over the aortic  arch. Left chest wall cardiac device in place with stable leads,  intact. Right PICC line tip not confidently visualized, though likely  over SVC. Unchanged mildly enlarged cardiomediastinal  silhouette.  Trace effusions. No pneumothorax. Unchanged nonacute interstitial  fibrosis without new focal pulmonary opacities.      Impression    Impression: Unchanged intra-aortic balloon pump marker at the aortic  arch. No new focal pulmonary opacities.    I have personally reviewed the examination and initial interpretation  and I agree with the findings.    GABRIEL COURTNEY MD         SYSTEM ID:  [id number]   XR Chest Port 1 View    Narrative    XR CHEST PORT 1 VIEW  7/1/2022 6:13 AM      HISTORY: IABP position    COMPARISON: 6/30/2022    FINDINGS: AP view of the chest. Intra-aortic balloon pump marker is  just above the level of [place]. Stable left chest wall ICD and right  arm PICC. Cardiac silhouette is stable. No pneumothorax. Stable  chronic interstitial opacities, right greater than left. No acute  airspace opacities. Stable trace pleural effusions.      Impression    IMPRESSION: Intra-aortic balloon pump marker is just above the level  of [place]. No acute airspace opacities.    I have personally reviewed the examination and initial interpretation  and I agree with the findings.    GABRIEL COURTNEY MD         SYSTEM ID:  [id number]   XR Chest Port 1 View     Value    Radiologist flags Mildly retracted IABP (Urgent)    Narrative    Chest radiograph  7/2/2022 1:58 AM      HISTORY: IABP position    COMPARISON: 7/1/2022, 6/30/2022    FINDINGS:   Single AP view of the chest. IABP marker near the level of the [place],  more medial than on prior exams, now projecting over the aortic arch.  Right axillary vascular access cannula with adjacent clips. Left chest  wall ICD with leads in similar position. Right arm PICC tip in the low  SVC. Stable mediastinum. Coronary artery stent. No pneumothorax or  significant pleural effusion. Similar diffuse interstitial and  airspace opacities.      Impression    IMPRESSION:   1. IABP marker near the level of the [place], more medial than on prior  exams and now projecting over the  aortic arch, suspicious for  retraction given subclavian approach. Stable remaining devices.  2. Stable pulmonary opacities.    [Urgent Result: Mildly retracted IABP]    Finding was identified on 7/2/2022 1:58 AM.     Dr. Kline was contacted by Dr. Hirsch at 7/2/2022 2:12 AM and  verbalized understanding of the urgent finding.     I have personally reviewed the examination and initial interpretation  and I agree with the findings.    YESENIA KEITH DO         SYSTEM ID:  [id number]   XR Chest Port 1 View    Narrative    XR CHEST PORT 1 VIEW  7/3/2022 1:35 AM      HISTORY: IABP position    COMPARISON: 7/2/2022    FINDINGS:   Single AP view of the chest. Stable left chest wall ICD leads. Right  arm PICC tip at the mid SVC. Superior IABP marker approximately 1.4 cm  above the level of the [place]. Stable mediastinum. Coronary artery  stents. No pneumothorax or significant pleural effusion. Similar  diffuse interstitial opacities with decreased bibasilar streaky  atelectasis.      Impression    IMPRESSION:   1. Superior IABP marker approximately 1.4 cm above the level of the  [place] but likely located within the proximal to mid aortic arch.   2. Mildly retracted right arm PICC tip at the mid SVC.   3. Stable probable mild pulmonary edema with decreased basilar  atelectasis.    I have personally reviewed the examination and initial interpretation  and I agree with the findings.    YESENIA KEITH DO         SYSTEM ID:  [id number]   XR Chest Port 1 View    Narrative    Exam: XR CHEST PORT 1 VIEW, 7/3/2022 11:58 AM    Indication: balloon pump position    Comparison: Same-day    Findings:   Intra-aortic balloon pump superior marker projecting slightly more  lateral than on the prior study, located 1.3 cm above the [place]. The  inferior marker projects at the level of T12, slightly lower than  before. Stable left chest wall implantable cardiac defibrillator.  Right upper extremity PICC tip at the mid to low SVC. Right  axillary  vascular access cannula with adjacent surgical clips. Coronary artery  stents. Stable cardiomediastinal silhouette. The pulmonary vasculature  is indistinct. Stable bilateral mixed interstitial and patchy airspace  opacities. Probable trace bilateral pleural effusions. No  pneumothorax.      Impression    Impression: Intra-aortic balloon pump superior marker projects  slightly more lateral than on the prior study and the inferior marker  projects slightly lower than before, may be projectional (with the  patient rotated slightly more to the left) or related to slight  repositioning. The superior marker is located 1.3 cm above the level  of the [place] and could be in the aortic arch or proximal descending  thoracic aorta.    YESENIA KEITH DO         SYSTEM ID:  [id number]   CT Chest Abdomen Pelvis w/o Contrast    Narrative    EXAMINATION: CT CHEST ABDOMEN PELVIS W/O CONTRAST, 7/3/2022 12:27 PM    INDICATION: Elevated panel of reactive antibodies, ?malignancy. Prefer  no contrast, prior to heart transplant/VAD    COMPARISON STUDY: CT 5/19/2022. Radiograph earlier same day.    TECHNIQUE: CT scan of the chest, abdomen, and pelvis was performed on  multidetector CT scanner using volumetric acquisition technique and  images were reconstructed in multiple planes with variable thickness  and reviewed on dedicated workstations. Noncontrast exam.    CT scan radiation dose is optimized to minimum requisite dose using  automated dose modulation techniques.    FINDINGS:    Lines, tubes, devices: Left chest wall implantable cardiac  defibrillator and leads in stable positioning. Right upper extremity  PICC tip at the mid to low SVC. Intra-aortic balloon pump superior  marker within the proximal to mid aortic arch. IVC filter in place.  CHEST:      Lungs: Small right and trace left pleural effusions. Diffuse smooth  interlobular septal thickening with regional groundglass opacities. No  suspicious focal pulmonary nodule.  Moderate apically predominant  paraseptal and centrilobular emphysematous changes. Chronic  interstitial and subpleural fibrotic disease. The central  tracheobronchial tree is patent.      Mediastinum: The visualized thyroid is unremarkable. Cardiomegaly.  Left ventricular subendocardial fat deposition and calcification  related to prior myocardial infarction. No pericardial effusion.  Coronary artery stenting. Normal caliber ascending aorta. Dilated main  pulmonary artery, measuring up to 3.4 cm. Standard branching pattern  of the great vessels. No abnormal thoracic lymph nodes.      ABDOMEN/PELVIS:    Liver: No mass. No intrahepatic biliary ductal dilation.  No focal  suspicious hepatic lesion.    Biliary System: Layering density within the bladder, favoring biliary  sludge. No extrahepatic biliary ductal dilation.    Pancreas: No mass or pancreatic ductal dilation.    Adrenal glands: No mass or nodules    Spleen: Calcified granuloma in the superior aspect of the spleen.    Kidneys: No suspicious mass, obstructing calculus or hydronephrosis.    Gastrointestinal tract : Normal caliber small bowel.  Colonic  diverticulosis with no diverticulitis.    Mesentery/peritoneum/retroperitoneum: Trace simple fluid about the  spleen.    Lymph nodes: No significant lymphadenopathy.    Vasculature: Patent major abdominal vasculature.    Pelvis: The urinary bladder is moderately distended and otherwise  unremarkable.  Prostate and seminal vesicles are within normal limits.    Osseous structures: Lucencies of the bilateral femoral heads without  evidence of collapse.  Mild degenerative changes of the spine.      Soft tissues: Associated with the right subclavian artery approach  intra-aortic balloon pump is an ill-defined 3.7 x 2.8 cm complex  collection containing foci of gas and fluid within the right  pectoralis musculature and inflammatory changes extending  superficially.      Impression    IMPRESSION:  1. No evidence of  malignancy in the chest, abdomen, or pelvis.  2. Right subclavian artery approach intra-aortic balloon pump superior  marker within the proximal to mid aortic arch.  3. Right moderate and left trace pleural effusions.  4. No significant change in chronic interstitial and subpleural  fibrotic changes.  5. Diffuse interlobular septal thickening and diffuse groundglass  opacities favor pulmonary interstitial edema although infection cannot  be entirely excluded.  6. Dilated main pulmonary artery, suggesting pulmonary hypertension.  7. Cardiomegaly with chronic sequela of prior myocardial infarction.  8. 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    I have personally reviewed the examination and initial interpretation  and I agree with the findings.    YESENIA KEITH DO         SYSTEM ID:  [id number]   XR Chest Port 1 View    Narrative    Portable chest 7/3/2022 at 1519 hours    INDICATION: Intra-aortic balloon pump repositioned    COMPARISON: Chest CT earlier today and plain film earlier today at  1145 hours    FINDINGS: Heart size normal. Implantable cardiac defibrillator. Radio  opaque intra-aortic balloon pump marker at the mid to inferior aortic  knob. Right axillary surgical clips. Mild haziness at the right  costophrenic angle.      Impression    IMPRESSION: Intra-aortic balloon pump marker projecting at the distal  arch. Implantable cardiac defibrillator. Mild atelectasis at right  lung base.    CONSTANTINO OWEN MD         SYSTEM ID:  [id number]   XR Chest Port 1 View    Narrative    EXAM: XR CHEST PORT 1 VIEW  7/3/2022 9:26 PM     HISTORY:  IABP not functioning properly       COMPARISON:  7/3/2022    FINDINGS  Technique: Semiupright AP view of the chest.     Devices: Intra-aortic balloon pump superior marker projects over the  high descending thoracic aorta. Left chest 3-lead cardiac pacer/ICD  without apparent change in position. Right PICC terminates at the  superior  cavoatrial junction.    No new focal airspace opacity.  No discernible pneumothorax.  No  pleural effusion.     Cardiomediastinal silhouette is  stable in size. Surgical clips  projected over the right axilla.      Impression    IMPRESSION:   Intra-aortic balloon pump superior marker projects over the high  descending thoracic aorta, similar position to most recent prior exam.    I have personally reviewed the examination and initial interpretation  and I agree with the findings.    CONSTANTINO OWEN MD         SYSTEM ID:  [id number]   XR Chest Port 1 View    Narrative    EXAM: XR CHEST PORT 1 VIEW  7/4/2022 9:53 AM     HISTORY:  swan       COMPARISON:  Chest radiograph, 7/3/2022    FINDINGS  Technique: Semiupright AP view of the chest.     Devices: Intra-aortic balloon pump superior marker projects over the  high descending thoracic aorta. Left chest 3-lead cardiac pacer/ICD  without apparent change in position. Right PICC terminates at the  superior cavoatrial junction.    No new focal airspace opacity.  No discernible pneumothorax.  No  pleural effusion.     Cardiomediastinal silhouette is  stable in size. Surgical clips  projected over the right axilla.      Impression    IMPRESSION:     1. No Templeton-Janae catheter visualized on this exam.  2. Stable supporting devices.  3. No focal consolidation, pneumothorax or pleural effusion.    I have personally reviewed the examination and initial interpretation  and I agree with the findings.    CONSTANTINO OWEN MD         SYSTEM ID:  [id number]   XR Chest Port 1 View    Narrative    Chest one view portable 50 degrees upright    HISTORY: Templeton position    COMPARISON STUDY: 7/4/2022    FINDINGS: Cardiac silhouette is nonenlarged. Coronary stent. Left  transvenous 3-lead implantable cardiac defibrillator. Right PICC with  tip in the mid SVC. Subclavian intra-aortic balloon pump in the high  descending aorta    Interstitial opacities bilaterally.      Impression    IMPRESSION:  No evidence of Lawtons-Janae catheter. Mild interstitial  opacities bilaterally, chronic.    GABRIEL COURTNEY MD         SYSTEM ID:  [id number]   XR Chest Port 1 View    Narrative    EXAM: XR CHEST PORT 1 VIEW  7/6/2022 12:31 AM     HISTORY:  iabp positioning       COMPARISON:  Chest radiograph, 7/3/2022    FINDINGS  Technique: Semiupright AP view of the chest.     Devices: Intra-aortic balloon pump superior marker projects over the  high descending thoracic aorta, unchanged. Left chest 3-lead cardiac  pacer/ICD, unchanged. Right PICC terminates over the low SVC. Left  sided coronary stent.    No new focal airspace opacity.  No discernible pneumothorax.  No  pleural effusion.     Cardiomediastinal silhouette is  stable in size. Surgical clips  projected over the right axilla.      Impression    IMPRESSION:   1. Stable support devices.  2. No focal consolidation, pneumothorax, or pleural effusion.    I have personally reviewed the examination and initial interpretation  and I agree with the findings.    LAI HERNADEZ MD         SYSTEM ID:  [id number]   XR Chest Port 1 View    Narrative    EXAM: XR CHEST PORT 1 VIEW  7/7/2022 1:05 AM     HISTORY:  iabp positioning       COMPARISON:  7/6/2022    FINDINGS  Technique: Semiupright AP view of the chest.     Devices: Right subclavian approach Intra-aortic balloon pump superior  marker projects over the high descending thoracic aorta, unchanged.  Left chest cardiac conduction device with leads projecting over the  right atrium, right ventricle, and a left ventricular vein. Right PICC  terminates over the mid SVC. Left-sided coronary stent.    No new focal airspace opacity.  No discernible pneumothorax.  No  pleural effusion.     Cardiomediastinal silhouette is  stable in size.      Impression    IMPRESSION:     1. Unchanged position of intra-aortic balloon pump.  2. No focal new airspace opacity.    I have personally reviewed the examination and initial interpretation  and I agree  with the findings.    LAI HERNADEZ MD         SYSTEM ID:  [id number]   XR Chest Port 1 View    Narrative    EXAM: XR CHEST PORT 1 VIEW  7/8/2022 5:45 AM     HISTORY:  IABP positioning       COMPARISON:  7/7/2022    FINDINGS  Technique: Semiupright AP view of the chest.     Devices: Right subclavian approach intra-aortic balloon pump superior  marker projects over the aortic arch, unchanged. Left chest cardiac  conduction device unchanged. Right PICC terminates over the mid SVC.  Left-sided coronary stent.    No new focal airspace opacity.  No discernible pneumothorax.  No  pleural effusion.     Cardiomediastinal silhouette is  stable in size.      Impression    IMPRESSION:     1. Unchanged position of intra-aortic balloon pump marker.  2. No new focal airspace opacity.    I have personally reviewed the examination and initial interpretation  and I agree with the findings.    LAI HERNADEZ MD         SYSTEM ID:  [id number]   XR Surgery TODD G/T 5 Min Fluoro    Narrative    This exam was marked as non-reportable because it will not be read by a   radiologist or a [place] non-radiologist provider.         XR Chest Port 1 View    Narrative    EXAM: XR CHEST PORT 1 VIEW  7/8/2022 7:36 PM     HISTORY:  Post Op CVTS Surgery       COMPARISON:  Radiograph 7/8/2022.    TECHNIQUE: AP supine view of the chest    FINDINGS:   Devices, lines, tubes: Right internal jugular ECMO cannula with tip  projecting over the main pulmonary artery. Left basilar chest tube in  place. LVAD in place. Mediastinal/pericardial drains in place. Packing  material projecting over the mediastinum. Endotracheal tube projecting  over the midthoracic trachea. Left chest wall implantable cardiac  defibrillator and leads in stable positioning. Right upper extremity  PICC appears to have a coil projecting over the right subclavian vein  with tip obscured by overlying catheters and packing material.  Surgical clips projecting over the right axilla.    The  trachea is midline. The cardiomediastinal silhouette is stable. No  appreciable pneumothorax, pleural effusion, or focal consolidative  opacity. No acute osseous abnormality.        Impression    IMPRESSION:   1. Endotracheal tube tip projecting over the midthoracic trachea.  2. Right internal jugular ECMO cannula with tip projecting over the  main pulmonary artery.  3. Interval placement of LVAD device with packing material projecting  over the mediastinum.  4. Right upper extremity PICC appears to coil within it projecting  over the right subclavian vein with tip obscured by overlying  catheters and packing material.  5. No focal airspace opacity.    I have personally reviewed the examination and initial interpretation  and I agree with the findings.    KHADAR CAMPOS MD         SYSTEM ID:  [id number]   XR Chest Port 1 View     Value    Radiologist flags right picc malposition (Urgent)    Narrative    EXAM: XR CHEST PORT 1 VIEW  7/9/2022 3:03 AM     HISTORY:  Post Op CVTS Surgery       COMPARISON:  7/8/2022    FINDINGS  Technique: Supine AP view of the chest.     Devices: Endotracheal tube terminates over the midthoracic trachea  approximately 6 cm above the paola. Right subclavian approach  intra-aortic balloon pump superior marker projects over the aortic  arch, unchanged. Left chest 3-lead cardiac conduction device. LVAD  device projects over the lower left hemithorax. Mediastinal drains  project over the mediastinum. Right internal jugular ECMO cannula  terminates over the main pulmonary artery. Packing material markers  project over the superior mediastinum. External pacer leads project  over the left chest. Right PICC terminates over the mid SVC.  Left-sided coronary stent.    No new focal airspace opacity.  No discernible pneumothorax.  No  pleural effusion.     Cardiomediastinal silhouette is  stable in size.      Impression    IMPRESSION:     1. Right internal jugular approach ECMO cannula terminates  over the  main pulmonary artery.  2. Right upper extremity PICC remains coiled in the expected location  of the right subclavian vein with tip projecting over the central  right brachiocephalic vein.  3. Endotracheal tube terminates approximately 6 cm above the apola.  4. Otherwise stable post operative chest with multiple surgical  sponges projecting over the mediastinum      [Urgent Result: right picc malposition]    Finding was identified on 7/9/2022 4:29 AM.     LITA Rivera was contacted by Dr. Courtney at 7/9/2022 9:58 AM and  verbalized understanding of the urgent finding.     I have personally reviewed the examination and initial interpretation  and I agree with the findings.    GABRIEL COURTNEY MD         SYSTEM ID:  [id number]   XR Abdomen Port 1 View    Narrative    EXAMINATION:  XR ABDOMEN PORT 1 VIEWS 7/8/2022 7:38 PM     COMPARISON: none.    HISTORY: CT 7/3/2022..    TECHNIQUE: Frontal view of the abdomen.    FINDINGS: Enteric tube side port projecting over the stomach and tip  projecting over the pylorus. Interval placement of LVAD. ECMO cannula  tip projecting over the main pulmonary artery. Pericardial/mediastinal  drains in place. Left basilar chest tube. Packing material projecting  over the mediastinum. No abnormally dilated loops of bowel. No  pneumatosis or portal venous gas.       Impression    IMPRESSION:   Enteric tube side-port projecting over the stomach and tip projecting  over the pylorus..    I have personally reviewed the examination and initial interpretation  and I agree with the findings.    KHADAR CAMPOS MD         SYSTEM ID:  [id number]   XR Chest Port 1 View    Narrative    EXAM: XR CHEST PORT 1 VIEW, 7/9/2022 4:49 AM    INDICATION: bleeding    COMPARISON:  7/9/2022    FINDINGS  Technique: Supine AP view of the chest.     Devices: Endotracheal tube terminates over the midthoracic trachea  approximately 6 cm above the paola. Right subclavian approach  intra-aortic balloon pump  superior marker projects over the aortic  arch, unchanged. Left chest 3-lead cardiac conduction device. LVAD  device projects over the lower left hemithorax. Mediastinal drains  project over the mediastinum. Right internal jugular ECMO cannula  terminates over the main pulmonary artery. Packing material markers  project over the superior mediastinum. External pacer leads project  over the left chest. Right PICC terminates over the mid SVC.  Left-sided coronary stent.    No new focal airspace opacity.  No discernible pneumothorax.  No  pleural effusion.  Unchanged cardiomegaly.      Impression    IMPRESSION:   No interval change.    I have personally reviewed the examination and initial interpretation  and I agree with the findings.    GABRIEL COURTNEY MD         SYSTEM ID:  [id number]   XR Feeding Tube Placement    Narrative    FEEDING TUBE PLACEMENT 7/11/2022 2:00 PM    INDICATION: Nutritional needs, open chest     COMPARISON: None.    FLUOROSCOPY TIME: 2.05 minutes    FINDINGS: The feeding tube was advanced under fluoroscopic guidance  with final position of tip in the second portion of the duodenum. A  small amount of barium was injected to demonstrate placement within  the small bowel. The tube was flushed with sterile water and secured  via nasal bridle. There were no complications of the procedure.   Partially visualized chest tubes/drains and IVC filter.      Impression    IMPRESSION: Uncomplicated feeding tube placement with tip in the  second portion of the duodenum.    I, JOSE M OSHEA MD, attest that I was present for all critical  portions of the procedure and was immediately available to provide  guidance and assistance during the remainder of the procedure.    I have personally reviewed the examination and initial interpretation  and I agree with the findings.    JOSE M OSHEA MD         SYSTEM ID:  [id number]   XR Chest Port 1 View    Narrative    EXAM: XR CHEST PORT 1 VIEW, 7/10/2022 2:34 AM    INDICATION:  RVAD/LVAD/ETT    COMPARISON:  7/9/2022    FINDINGS  Technique: Supine AP view of the chest.     Devices: Endotracheal tube terminates over the midthoracic trachea  approximately 5 cm above the paola. Right subclavian approach  intra-aortic balloon pump superior marker projects over the aortic  arch, unchanged. Left chest 3-lead cardiac conduction device. LVAD  device projects over the lower left hemithorax. Mediastinal drains  project over the mediastinum. Right internal jugular ECMO cannula  terminates over the main pulmonary artery. Packing material markers  project over the superior mediastinum. External pacer leads project  over the left chest. Right PICC is coiled over the right subclavian  vein and terminates over the right innominate vein. Left-sided  coronary stent.    No new focal airspace opacity.  No discernible pneumothorax.  No  pleural effusion.  Unchanged cardiomegaly.      Impression    IMPRESSION:   1. No significant change in RVAD, LVAD, and ETT position.  2. Right PICC remains coiled in the subclavian vein.   3. No new focal airspace opacity.     Results discussed with Farhat Ahuja RN on 7/10/2022 at 0749 hours.     I have personally reviewed the examination and initial interpretation  and I agree with the findings.    GABRIEL COURTNEY MD         SYSTEM ID:  [id number]   XR Chest Port 1 View    Narrative    XR CHEST PORT 1 VIEW  7/10/2022 10:03 AM      HISTORY: assess PICC line coil after power flush    COMPARISON: 7/10/2022    FINDINGS:   Single AP view of the chest. Postoperative changes of LVAD. Stable  left chest wall ICD leads. Stable right IJ RVAD. Endotracheal tip over  the mid thoracic trachea. Enteric tube courses beyond the  field-of-view. Left basilar chest tube and mediastinal drains in  similar position. Similar surgical sponges over the mediastinum. Right  arm PICC tip over the low SVC with resolved loop. Stable mediastinum.  No pneumothorax or significant right pleural effusion.  Small left  pleural effusion. Similar retrocardiac opacities.      Impression    IMPRESSION:   1. Resolved right arm PICC looping with tip in the low SVC. Stable  remaining devices.  2. Small left pleural effusion with unchanged retrocardiac  atelectasis/consolidation.    I have personally reviewed the examination and initial interpretation  and I agree with the findings.    GABRIEL COURTNEY MD         SYSTEM ID:  [id number]   XR Chest Port 1 View    Narrative    EXAM: XR CHEST PORT 1 VIEW  7/11/2022 12:58 AM     HISTORY:  RVAD/LVAD/ETT       COMPARISON:  7/10/2022     FINDINGS: Single view of the chest. The endotracheal tube tip projects  over the midthoracic trachea. Tip and sidehole of the enteric tube  projects off the field of view but at least to the fundus of the  stomach. Stable LVAD and left chest wall cardiac device with atrial  and ventricular leads. Right upper extremity PICC tip is obscured in  the region of the high SVC. Stable positioning of right IJ RVAD with  its tip in the pulmonary artery. Mediastinal drains and left basilar  chest tube. Chest is open with surgical sponges project over the  mediastinum.     Stable enlarged cardiac silhouette. Small bilateral pleural effusions.  No appreciable pneumothorax. Continued bibasilar opacities.      Impression    IMPRESSION:   1. Stable support devices.  2. No significant change in small pleural effusions with overlying  bibasilar opacities.    I have personally reviewed the examination and initial interpretation  and I agree with the findings.    LAI HERNADEZ MD         SYSTEM ID:  [id number]   XR Abdomen Port 1 View    Narrative    XR ABDOMEN PORT 1 VIEWS  7/11/2022 6:56 PM      HISTORY: post chest closure and verification of lines    COMPARISON: 7/11/2022, 7/3/2022    FINDINGS:   Single AP view of the abdomen. Stable thoracic findings. Stable IVC  filter. Feeding tube tip overlying the proximal jejunum with residual  enteric contrast over the stomach.  Relative paucity of bowel gas. No  definite pneumatosis or portal venous gas.      Impression    IMPRESSION:   Feeding tube tip overlying the proximal jejunum.    I have personally reviewed the examination and initial interpretation  and I agree with the findings.    KASI EAST MD         SYSTEM ID:  [id number]   XR Chest Port 1 View    Narrative    EXAM: TEMPORARY  7/11/2022 5:37 PM     HISTORY:  Chest closure, intraoperative       COMPARISON:  7/11/2022    FINDINGS  Technique: Semiupright AP view of the chest.     Devices: Single view of the chest. The endotracheal tube tip projects  over the midthoracic trachea. Gastric tube passes below the left  hemidiaphragm. Stable LVAD and left chest wall cardiac device with  atrial and ventricular leads. Right upper extremity PICC tip is  obscured in the region of the high SVC. Stable positioning of right IJ  RVAD with tip in the pulmonary artery. Mediastinal drains and left  basilar chest tube. Chest is open with surgical sponges projected over  the mediastinum.    Unchanged cardiomegaly. Small bilateral pleural effusions. No  discernible pneumothorax.       Impression    IMPRESSION:   No unexpected retained foreign body. Remaining support devices stable  in position.    Results reported to LITA Camp, OR circulator 7/11/2022 1749 hours    I have personally reviewed the examination and initial interpretation  and I agree with the findings.    KASI EAST MD         SYSTEM ID:  [id number]   XR Chest Port 1 View    Narrative    EXAM: XR CHEST PORT 1 VIEW  7/12/2022 1:15 AM     HISTORY:  RVAD/LVAD/ETT       COMPARISON:  7/11/2022    FINDINGS: Single view of the chest. Postsurgical changes of the chest  with median sternotomy wires. Endotracheal tube tip projects over the  mid thoracic trachea. Feeding tube courses below the diaphragm and  beyond the field-of-view. The tip is at least to the body of the  stomach. Right upper extremity PICC tip is obscured in the region  of  mid SVC. Right IJ RVAD with tip over the distal main pulmonary trunk.  Stable left chest wall cardiac device and LVAD. Stable mediastinal  drains and left basilar chest tube.     Stable enlarged cardiac silhouette. Small bilateral pleural effusions.  No appreciable pneumothorax. Similar perihilar and bibasilar  opacities.      Impression    IMPRESSION:   1. Stable support devices.  2. Stable small pleural effusions and bilateral pulmonary opacities.    I have personally reviewed the examination and initial interpretation  and I agree with the findings.    LAI HERNADEZ MD         SYSTEM ID:  [id number]   XR Abdomen Port 1 View    Narrative    Exam: XR ABDOMEN PORT 1 VIEWS 7/12/2022 2:24 PM    Indication: OG placement and NJ placement    Comparison: 7/11/2022    Findings:   Portable supine AP view of the abdomen obtained. The feeding tube tip  projects over the proximal jejunum. The gastric tube tip and sidehole  project over the stomach. Stable IVC filter, partially imaged chest  tubes, mediastinal drains, RVAD and LVAD. Nonspecific paucity of bowel  gas in the upper abdomen. No air-filled dilated loops of bowel. No  pneumatosis or portal venous gas. No acute osseous abnormalities.        Impression    Impression:   1. The gastric tube tip and sidehole project over the stomach.  2. The feeding tube tip projects over the proximal jejunum.     KWAME OLMOS MD         SYSTEM ID:  [id number]   CTA Chest Abdomen Pelvis w Contrast     Value    Radiologist flags Moderate to large hemopericardium (Urgent)    Narrative    CTA CHEST, ABDOMEN, AND PELVIS  7/12/2022 6:28 PM    CLINICAL HISTORY: evaluate for bleeding. Placement of LVAD and  percutaneous RVAD 7/8/2022. Incision and drainage with chest washout  7/11/2022.    COMPARISONS: CT 7/3/2022    REFERRING PROVIDER: MARCELLA RAMÍREZ    TECHNIQUE: Unenhanced CT performed through the chest, abdomen, and  pelvis. Following the uneventful administration of  intravenous  contrast, CTA was performed through the chest, abdomen, and pelvis.  After an 80 second delay CT was repeated through the chest, abdomen,  and pelvis. Coronal and sagittal reconstructions were produced. Lung  MIP produced.    3D and multiplanar reconstructions were unavailable at the time of  dictation.    CONTRAST: 90 mL Isovue 370    DOSE (DLP): 4027 mGy*cm    FINDINGS:     CTA:    Postoperative changes of LVAD in recent sternal closure with moderate  to large hemopericardium centered predominantly about the LVAD outflow  tract without focal contrast extravasation identified. Normal caliber  thoracic aorta. No evidence for dissection, intramural hematoma, or  other acute pathology. Conventional arch anatomy. Widely patent celiac  trunk, SMA, bilateral single renal arteries, and HE. Mild aortic  atherosclerosis. Irregular plaque versus focal dissection in the right  common iliac artery, otherwise patent right iliofemoral arteries.  Patent left iliofemoral arteries. Normal caliber main pulmonary  artery. No acute pulmonary embolism.    Permanent Thompson inferior vena cava filter in place. Date of  placement unknown. Tip tilted posteriorly against the posterior wall.  All of the legs penetrate through the caval walls. The two anterior  most legs abut small bowel loops. Periosteal reaction and apparent  incorporation of a posterior leg in an osteophyte from L3.    Remaining CT:    Endotracheal tube tip in the mid thoracic trachea. Right IJ RVAD in  the distal main pulmonary artery. Stable left chest wall device leads.  Enteric tube tip in the stomach. Feeding tube tip in the proximal  jejunum. Right arm PICC tip at the innominate confluence. Four  mediastinal drains. Left basilar chest tube. Left groin venous  catheter is in the left external iliac vein. Left groin arterial  catheter in the left external iliac artery. Fitzgerald catheter within the  bladder with post-instrument air.    Cardiomegaly.  Residual postoperative pneumomediastinum. Scattered  subcutaneous emphysema in the lower neck and anterior chest.  Sternotomy. Probable reactive mediastinal nodes. No definite right  pneumothorax. Tiny left pneumothorax. Moderate right and small left  pleural effusions with associated atelectasis. Diffuse smooth  interlobular septal thickening. Biapical emphysematous changes.    Hepatomegaly. Focal hypodensity in the posterior spleen. Enteric  contrast throughout the colon. Stable abdominopelvic lymph nodes.  Small simple pelvic free fluid. Anasarca. Small fat-containing  inguinal hernias.      Impression    IMPRESSION:  1. Moderate to large hemopericardium centered predominantly about the  LVAD outflow tract without focal contrast extravasation identified.   2. Cardiomegaly with expected postoperative changes of recent  sternotomy closure.  3. Mild pulmonary edema with moderate right and small left pleural  effusions and associated atelectasis.  4. Tiny left pneumothorax with chest tube in place.  5. Probable small splenic infarct.  6. Additional incidental/chronic findings as noted above.    [Urgent Result: Moderate to large hemopericardium ]    Finding was identified on 7/12/2022 6:35 PM.     Dr. Armendariz was contacted by Dr. Hirsch at 7/12/2022 7:05 PM and  verbalized understanding of the urgent finding.     I have personally reviewed the examination and initial interpretation  and I agree with the findings.    MARINA CROSS MD         SYSTEM ID:  [id number]   XR Chest Port 1 View    Narrative    EXAM: XR CHEST PORT 1 VIEW  7/13/2022 1:05 AM     HISTORY:  RVAD/LVAD/ETT       COMPARISON:  7/12/2022    FINDINGS: Single view of the chest. Endotracheal tube tip projects  over the midthoracic trachea. Enteric tube courses below the diaphragm  and beyond the field-of-view. Esophageal temperature probe projects  over the proximal esophagus. Right IJ RVAD with tip over the distal  main pulmonary trunk. Right upper extremity  PICC tip is obscured in  the region of the mid SVC. Left chest wall cardiac. Partially imaged a  VAD. Mediastinal drains and left basilar chest tube. Chest remains  open with surgical sponges over the mediastinum.     Stable enlarged cardiac silhouette. Small bilateral pleural effusions.  No appreciable pneumothorax. Similar perihilar and bibasilar pulmonary  opacities.      Impression    IMPRESSION: Chest remains open with surgical gauze at the midline.   1. Stable support devices.  2. No significant change in small pleural effusions with perihilar and  bibasilar pulmonary opacities.    I have personally reviewed the examination and initial interpretation  and I agree with the findings.    LAI HERNADEZ MD         SYSTEM ID:  [id number]   XR Chest Port 1 View    Narrative    Chest radiograph  7/13/2022 6:20 PM      HISTORY: Open chest closure    COMPARISON: 7/13/2022    FINDINGS:   Single AP view of the chest. Postoperative changes of the LVAD.  Endotracheal tip over the mid thoracic trachea. Enteric and feeding  tubes course beyond the field-of-view. Stable left chest wall ICD  leads. Stable right IJ RVAD. Stable right arm PICC tip. Left basilar  chest tube and mediastinal drains. Vascular closure device over the  right axilla. Esophageal temperature probe overlying the low left  neck. Interval removal of mediastinal surgical sponge. No definite  radiopaque retained objects. Stable mediastinum. No pneumothorax.  Trace pleural effusions. Similar perihilar and decreased left greater  than right interstitial and airspace opacities.      Impression    IMPRESSION:   No definite radiopaque retained objects following chest closure.    The above findings were discussed with Shraddha Campos from the OR by  Dr. Hirsch on 7/13/2022 at 6:26 PM.    I have personally reviewed the examination and initial interpretation  and I agree with the findings.    SHAQ PRABHAKAR MD         SYSTEM ID:  [id number]   XR Chest Port 1 View     Narrative    EXAM: XR CHEST PORT 1 VIEW  7/14/2022 1:24 AM     HISTORY:  RVAD/LVAD/ETT       COMPARISON:  7/13/2022    FINDINGS: Single view of the chest. Endotracheal tube tip projects  over the midthoracic trachea. Esophageal temperature probe projects  over the proximal esophagus. Enteric tubes course below the diaphragm  and out of the field of view. Right IJ RVAD tip projects over the main  pulmonary trunk. Stable left chest wall cardiac device and LVAD.  Stable mediastinal drains and left basilar chest tube.    Stable enlarged cardiac silhouette. Small bilateral pleural effusions.  No appreciable pneumothorax. Similar perihilar and bibasilar pulmonary  opacities.      Impression    IMPRESSION:   1. Stable support devices.  2. No significant change in small pleural effusions and bilateral  pulmonary opacities.    I have personally reviewed the examination and initial interpretation  and I agree with the findings.    LAI HERNADEZ MD         SYSTEM ID:  [id number]   XR Chest Port 1 View    Narrative    Chest one view portable spine    HISTORY: Endotracheal tube placement check position    COMPARISON STUDY: Same day at 0022    Findings: LVAD, left transvenous implantable cardiac defibrillator.  Right IJ ECMO cannula. Based on drain. Endotracheal tube tip in the  mid trachea. Feeding tube and enteric tube collimated off the film in  the abdomen. Right axillary arterial sheath. Right PICC with tip in  the mid SVC. Interstitial opacities bilaterally. Left basilar  atelectasis.      Impression    IMPRESSION: Interstitial opacities bilaterally compatible with  interstitial edema    GABRIEL COURTNEY MD         SYSTEM ID:  [id number]   XR Chest Port 1 View    Narrative    EXAM: XR CHEST PORT 1 VIEW  7/15/2022 1:44 AM     HISTORY:  RVAD/LVAD/ETT       COMPARISON:  7/14/2022    FINDINGS: Single view of the chest. Postsurgical changes of the chest  with intact median sternotomy wires. Endotracheal tube tip projects  over the  low thoracic trachea. Esophageal temperature probe projects  over the proximal esophagus. Enteric tubes course below the diaphragm  and beyond the field-of-view. Right IJ RVAD tip projects over the main  pulmonary trunk. Right upper extremity PICC tip projects over the mid  SVC. Stable left chest wall cardiac device LVAD. Stable mediastinal  drains and left basilar chest tube.    Stable cardiomediastinal silhouette. Small pleural effusions. No  appreciable pneumothorax. Mildly improved diffuse bilateral hazy  opacities. Persistent bibasilar atelectasis.      Impression    IMPRESSION:   1. Mildly improved bilateral diffuse hazy opacities likely  representing pulmonary edema.  2. Stable support devices.    I have personally reviewed the examination and initial interpretation  and I agree with the findings.    LAI HERNADEZ MD         SYSTEM ID:  [id number]   XR Chest Port 1 View    Narrative    EXAM: XR CHEST PORT 1 VIEW  7/15/2022 6:29 AM     HISTORY:  Desaturation       COMPARISON:  Earlier same day chest radiograph    FINDINGS: Single view of the chest. Surgical changes of the chest with  intact median sternotomy wires. Endotracheal tube tip projects over  the low thoracic trachea. Enteric tubes course below the diaphragm and  beyond the field-of-view. The esophageal temperature probe projects at  the proximal esophagus. Right IJ RVAD tip projects over the main  pulmonary trunk. Right upper extremity PICC tip projects over the mid  SVC. Stable left chest wall cardiac device and LVAD. Stable  mediastinal drains and left chest tube.    Stable enlarged cardiac silhouette. Blunting of the costophrenic  angles. No appreciable pneumothorax. Low lung volumes with similar  bilateral diffuse mixed interstitial and airspace opacities.      Impression    IMPRESSION:   1. No significant change in small pleural effusions with bilateral  diffuse pulmonary opacities.  2. Stable support devices.    I have personally reviewed the  examination and initial interpretation  and I agree with the findings.    LAI HERNADEZ MD         SYSTEM ID:  [id number]   XR Chest Port 1 View    Narrative    EXAM: XR CHEST PORT 1 VIEW  7/16/2022 3:29 AM     HISTORY:  RVAD/LVAD/ETT       COMPARISON:  7/15/2022    FINDINGS: Single view of the chest. Postsurgical changes of the chest  with intact median sternotomy wires. Endotracheal tube tip projects  over the low thoracic trachea. Enteric tubes course below the  diaphragm and beyond the field-of-view. Right IJ RVAD tip projects  over the main portal trunk. Right upper extremity PICC tip in the  region of the mid SVC. Stable left chest wall cardiac device in the  LVAD. Stable mediastinal drains and left basilar chest tube.    Stable cardiac mediastinal silhouette. Similar small pleural  effusions. No appreciable pneumothorax. Low lung volumes with  continued bilateral diffuse mixed interstitial and airspace opacities.      Impression    IMPRESSION:   1. Stable support devices.  2. No significant change in small pleural effusions and bilateral  diffuse pulmonary opacities.    I have personally reviewed the examination and initial interpretation  and I agree with the findings.    MILES KENNY MD         SYSTEM ID:  [id number]   XR Chest Port 1 View    Narrative    Exam: XR CHEST PORT 1 VIEW, 7/17/2022 10:06 AM    Indication: RVAD/LVAD    Comparison: Chest radiograph 7/16/2022    Findings:   Portable AP chest radiograph. Postsurgical changes of the chest with  intact median sternotomy wires. Endotracheal tube tip projects over  the low thoracic trachea. Enteric tubes course below the diaphragm and  beyond the field-of-view. Right IJ RVAD tip projects over the main  portal trunk. Right upper extremity PICC tip in the region of the mid  SVC. Stable left chest wall cardiac device and the LVAD. Stable  mediastinal drains and left basilar chest tube.    Stable cardiac mediastinal silhouette. Similar small  pleural  effusions. No appreciable pneumothorax. Low lung volumes with  continued bilateral diffuse mixed interstitial and airspace opacities.      Impression    Impression:   1.  Stable support devices.  2.  No significant change in small pleural effusions and diffuse  pulmonary opacities.    MILES KENNY MD         SYSTEM ID:  [id number]   XR Chest Port 1 View    Narrative    EXAM: XR CHEST PORT 1 VIEW  7/18/2022 1:15 AM     HISTORY:  intubated, RVAD and LVAD       COMPARISON:  Radiographs 7/17/2022, 7/16/2022.    TECHNIQUE: AP supine view of the chest    FINDINGS:   Devices, lines, tubes: Right internal jugular ECMO cannula,  endotracheal tube, left chest wall implantable cardiac defibrillator  and leads, LVAD, left basilar chest tube, and mediastinal/pericardial  drains are in similar position. Feeding tube courses inferior to the  left hemidiaphragm with tip outside the field-of-view. Tip is at least  to the body of the stomach. Median sternotomy wires are intact.    The trachea is midline. The cardiomediastinal silhouette is stable.  The pulmonary vasculature is indistinct. Persistent low lung volumes  with continued bilateral mixed interstitial and airspace opacities.  Similar bilateral small pleural effusions. No appreciable  pneumothorax. No acute osseous abnormality.       Impression    IMPRESSION:   1. Support devices are in similar positioning.  2. Persistent low lung volumes with similar small bilateral pleural  effusions and diffuse mixed pulmonary opacities.    I have personally reviewed the examination and initial interpretation  and I agree with the findings.    LAI HERNADEZ MD         SYSTEM ID:  [id number]   US Upper Extremity Venous Duplex Left Portable    Narrative    EXAMINATION: DOPPLER VENOUS ULTRASOUND OF THE LEFT UPPER EXTREMITY,  7/18/2022 11:04 AM     COMPARISON: 5/20/2022    HISTORY: Edema.  Recent LVAD and RVAD placement 7/8/2022 given history  of cardiogenic shock, advanced  ischemic cardiomyopathy.      TECHNIQUE:  Gray-scale evaluation with compression, spectral flow and  color Doppler assessment of the deep venous system of the left upper  extremity.    FINDINGS:  Left: Normal blood flow and waveforms are demonstrated in the internal  jugular, innominate, subclavian, and axillary veins. There is normal  compressibility of the visualized brachial and basilic mid arm.  Left  upper extremity bandage mid upper arm obscures brachial and basilic  veins at this point.  Patent left cephalic vein; incidentally noted  left cephalic vein with thick walls.  Pulsatile venous waveforms  noted.    Nonspecific mild subcutaneous edema in the left upper extremity.      Impression    IMPRESSION:  No evidence of left upper extremity deep venous  thrombosis.    I have personally reviewed the examination and initial interpretation  and I agree with the findings.    JOSE M OSHEA MD         SYSTEM ID:  [id number]   XR Chest Port 1 View    Narrative    EXAM: XR CHEST PORT 1 VIEW  7/19/2022 1:00 AM     HISTORY:  intubated, RVAD and LVAD       COMPARISON:  Radiographs 7/18/2022, 7/17/2022.    TECHNIQUE: AP supine view of the chest    FINDINGS:   Devices, lines, tubes: Right internal jugular ECMO cannula tip  projecting over the main pulmonary artery. Right upper extremity PICC  tip is obscured but likely projecting over the mid SVC. Partially  visualized catheter tubing projecting over the left axilla.  Endotracheal tube projecting 2.5 cm superior to the paola. An  esophageal temperature probe projecting over the mid esophagus. Left  chest wall implantable cardiac defibrillator and leads in stable  position. Left basilar chest tube and mediastinal/pericardial drains  in stable position. LVAD in place. Right axillary surgical clips.    The trachea is midline. The cardiomediastinal silhouette is stable.  Coronary artery stent. Improved lung volumes with similar bilateral  mixed interstitial and airspace  opacities..  No appreciable  pneumothorax. No acute osseous abnormality.       Impression    IMPRESSION:   1. Supportive devices in similar positioning.  2. Improved lung volumes with similar diffuse mixed pulmonary  opacities.    I have personally reviewed the examination and initial interpretation  and I agree with the findings.    KWAME WHITMORE MD         SYSTEM ID:  [id number]   XR Chest Port 1 View    Narrative    EXAM: XR CHEST PORT 1 VIEW  7/20/2022 1:06 AM     HISTORY:  intubated, RVAD and LVAD       COMPARISON:  Radiograph 7/19/2022    TECHNIQUE: AP supine view of the chest    FINDINGS:   Devices, lines, tubes: Left chest wall implantable cardiac  defibrillator and leads in stable positioning. Right internal jugular  ECMO cannula tip projecting over the main pulmonary artery.  Endotracheal tube tip projecting 3.5 cm superior to the paola.  Esophageal temperature probe projecting over the mid esophagus. LVAD  in place. Feeding tube and enteric tubes coursing inferior to the left  hemidiaphragm with tips outside of the field of view. Right upper  extremity PICC tip is obscured but likely projecting over the mid SVC.  Left basilar chest tube and mediastinal/pericardial drains in stable  positioning. Median sternotomy wires are intact. Coronary artery  stent.    The trachea is midline. The cardiomediastinal silhouette is stably  enlarged. The pulmonary vasculature is indistinct. Slightly worse in  the bilateral mixed interstitial and airspace opacities.  No  appreciable pneumothorax. No acute osseous abnormality.        Impression    IMPRESSION:   1. Supportive devices in similar positioning.  2. Slightly worsening bilateral mixed pulmonary opacities.    I have personally reviewed the examination and initial interpretation  and I agree with the findings.    LAI HERNADEZ MD         SYSTEM ID:  [id number]   XR Abdomen Port 1 View    Narrative    EXAM: XR ABDOMEN PORT 1 VIEW  7/20/2022 1:29 PM     HISTORY:  s/p  ET removal       TECHNIQUE: Single frontal radiograph of the abdomen    COMPARISON:  CT abdomen 7/12/2022    FINDINGS:   Single AP portable supine view of the abdomen. Enteric tube and stylet  projects over the expected location of the proximal jejunum.  Mediastinal, pericardial, and pleural tube traverse above the  field-of-view. Partially visualized LVAD and drive line. IVC filter  projects over the expected location of IVC.    Nonobstructive bowel gas pattern. No pneumatosis, portal venous gas.      Impression    IMPRESSION:   1. Nonobstructive bowel gas pattern.   2. Enteric tube with tip and stylette project over the expected  location of the proximal jejunum.    I have personally reviewed the examination and initial interpretation  and I agree with the findings.    MILES KENNY MD         SYSTEM ID:  [id number]   XR Chest Port 1 View    Narrative    EXAM: XR CHEST PORT 1 VIEW  7/21/2022 6:00 AM     HISTORY:  intubated, RVAD and LVAD       COMPARISON:  Radiographs of 7/20/2022, 7/19/2022.    TECHNIQUE: AP view the chest at 20 degrees    FINDINGS:   Devices, lines, tubes: Interval removal of endotracheal tube,  esophageal temperature probe, an enteric tube. Partially visualized  LVAD. Right internal jugular ECMO cannula tip projecting over the main  pulmonary artery. Right upper extremity PICC tip is obscured but  likely projecting over the mid SVC. Left basilar chest tube and  mediastinal/pericardial drains are stable. Feeding tube coursing  inferior to the left hemidiaphragm with tip outside the field-of-view.  Left chest wall implantable cardiac defibrillator leads in stable  positioning. Median sternotomy wires are intact. Coronary artery  stenting.    The left costophrenic angle is collimated outside the field of view.  The trachea is midline. The cardiomediastinal silhouette is stably  enlarged. Increasing perihilar hazy opacities. Blunting of the right  costophrenic angle.  No appreciable  pneumothorax. No acute osseous  abnormality.       Impression    IMPRESSION:   1. Remaining supportive devices are in stable positioning.  2. Increasing pulmonary interstitial edema and right pleural effusion.  3. The left costophrenic angle is collimated outside the field of  view.    I have personally reviewed the examination and initial interpretation  and I agree with the findings.    LAI HERNADEZ MD         SYSTEM ID:  [id number]   XR Chest Port 1 View    Narrative    EXAM: XR CHEST PORT 1 VIEW  7/21/2022 10:08 PM     HISTORY:  resp distress       COMPARISON:  Earlier same day chest radiograph    FINDINGS: Single view of the chest. Postsurgical changes of the chest  with intact median sternotomy wires. Enteric tube courses below the  diaphragm and beyond the field-of-view. Right upper extremity PICC tip  projects over the high SVC. Left midline catheter tip projects over  the axilla. Stable left chest wall cardiac device and LVAD. Stable  mediastinal drains and left basilar chest tube.     Stable enlarged cardiac silhouette. Small bilateral pleural effusions.  No appreciable pneumothorax. Similar bilateral diffuse mixed  interstitial and airspace opacities.      Impression    IMPRESSION:   1. No significant change in small pleural effusions and bilateral  diffuse pulmonary opacities.  2. Right IJ ECMO cannula has been removed. Additional support devices  are stable.    I have personally reviewed the examination and initial interpretation  and I agree with the findings.    LAI HERNADEZ MD         SYSTEM ID:  [id number]   XR Chest Port 1 View    Narrative    EXAM: TEMPORARY  7/23/2022 3:41 AM     HISTORY:  ECMO, RVAD, LVAD       COMPARISON:  Radiographs 7/21/2022.    TECHNIQUE: AP view the chest at 30 degrees    FINDINGS:   Devices, lines, tubes: LVAD in place. Left chest wall implantable  cardiac defibrillator and leads in stable position. Right upper  extremity PICC tip projecting over the mid SVC. Feeding tube  coursing  inferior to the left hemidiaphragm with tip outside the field-of-view.  Median sternotomy wires are intact. Left basilar chest tube and  mediastinal/pericardial drains in stable positioning. Coronary artery  stenting. Surgical clips projecting over the right axilla.    The trachea is midline. The cardiomediastinal silhouette is stable  enlarged. The pulmonary vasculature is indistinct. No substantial  change in bilateral mixed interstitial and airspace opacities. Small  bilateral pleural effusions.  No appreciable pneumothorax, pleural  effusion, or focal consolidative opacity. No acute osseous  abnormality.        Impression    IMPRESSION:   1. Supportive devices in similar position.  2. No significant change in bilateral mixed pulmonary opacities and  small pleural effusions.    I have personally reviewed the examination and initial interpretation  and I agree with the findings.    RAHEEM MEAD MD         SYSTEM ID:  [id number]   XR Chest Port 1 View    Narrative    EXAM: XR CHEST PORT 1 VIEW  7/23/2022 12:30 PM     HISTORY:  new JVD on left, hc of mobile RV thrombus present       COMPARISON:  Same-day chest radiograph    FINDINGS:   AP portable view of the chest. Midline trachea. Cardiac silhouette  size is stable. LVAD in place. Similar positioning positioning of the  right arm PICC, 2 left-sided chest tubes, left chest wall  defibrillator, and mediastinal/pericardial drains. Coronary artery  stents. Surgical clips project in the right axilla. Intact median  sternotomy wires.    Increased size of the mild right pleural effusion. No appreciable  pneumothorax. Stable small left pleural effusion. No significant  change in the bilateral mixed interstitial and hazy pulmonary  opacities. Visualized upper abdomen is unremarkable.      Impression    IMPRESSION:   1. Increased mild right pleural effusion. Stable small left pleural  effusion.  2. No significant change in the bilateral mixed pulmonary  opacities,  likely pulmonary edema.  3. Support devices remain in similar position.    I have personally reviewed the examination and initial interpretation  and I agree with the findings.    RAHEEM MEAD MD         SYSTEM ID:  [id number]   US Upper Extremity Venous Duplex Left Portable    Narrative    EXAMINATION: DOPPLER VENOUS ULTRASOUND OF THE LEFT UPPER EXTREMITY,  7/23/2022 2:27 PM     COMPARISON: 7/18/2022.    HISTORY: Swelling of left upper extremity    TECHNIQUE:  Gray-scale evaluation with compression, spectral flow and  color Doppler assessment of the deep venous system of the left upper  extremity.    FINDINGS:    Left: Normal blood flow and waveforms are demonstrated in the internal  jugular, innominate, subclavian, and axillary veins. There is normal  compressibility of the brachial, basilic and cephalic veins. Left  upper arm obscured by bandage: Cephalic vein is thick walled appearing  in the antecubital fossa, though compressible.        Impression    IMPRESSION: Left upper arm obscured by bandage, otherwise, no left  upper extremity deep venous thrombus is demonstrated.    I have personally reviewed the examination and initial interpretation  and I agree with the findings.    JILL CARRANZA MD         SYSTEM ID:  [id number]   XR Chest Port 1 View    Narrative    EXAM: TEMPORARY  7/24/2022 3:07 AM     HISTORY:  ECMO, RVAD, LVAD       COMPARISON:  Radiographs 7/23/2022.    TECHNIQUE: AP view of the chest the 30 degrees.    FINDINGS:   Devices, lines, tubes: Right upper extremity PICC tip projecting over  the mid SVC. Left chest wall implantable cardiac defibrillator and  leads in stable positioning. Feeding tube coursing inferior to the  left hemidiaphragm with tip outside the field-of-view. Median  sternotomy wires are intact. Coronary artery stenting. Left basilar  chest tube and mediastinal/pericardial drain and stable positioning.    The trachea is midline. The cardiomediastinal silhouette is  stably  enlarged. Increased size of right pleural effusion. The left  costophrenic angle is obscured by support devices.  Interval increase  in bibasilar hazy opacities. No appreciable pneumothorax. No acute  osseous abnormality.        Impression    IMPRESSION:   1. Interval increased size of right pleural effusion with overlying  opacities.  2. No significant change in bilateral mixed pulmonary opacities,  favoring pulmonary edema.  3. Support devices in similar positioning.    I have personally reviewed the examination and initial interpretation  and I agree with the findings.    RAHEEM MEAD MD         SYSTEM ID:  [id number]   XR Chest Port 1 View    Narrative    Exam: XR CHEST PORT 1 VIEW, 7/25/2022 5:47 AM    Indication: Left chest tube status post RVAD and LVAD.    Comparison: Chest x-ray 7/24/2022, 7/23/2022.    Findings:   Portable AP 20 degree upright chest x-ray. Postsurgical changes of the  chest. Median sternotomy wires are intact. Left chest wall pacemaker  with leads terminating over the right atrium and left ventricle. LVAD.  Left chest tube in stable position right arm PICC terminates over the  low SVC. Enteric tube courses below the margin of the film.    Trachea is midline. Cardiac silhouette is enlarged. Pulmonary  vasculature is indistinct. Stable bilateral perihilar opacities. Small  bilateral pleural effusions are unchanged. No pneumothorax.    Visualized osseous structures and soft tissues are unremarkable.      Impression    Impression:   1.  Stable cardiomegaly with unchanged bilateral perihilar opacities  suggestive for pulmonary edema.  2.  Small bilateral pleural effusions are unchanged.  3.  Stable support devices as above.     I have personally reviewed the examination and initial interpretation  and I agree with the findings.    LAI HERNADEZ MD         SYSTEM ID:  [id number]   XR Chest Port 1 View    Narrative    EXAM: XR CHEST PORT 1 VIEW  7/25/2022 1:07 PM    HISTORY: 61 years Male  s/p left chest tube removal.     COMPARISON: Chest radiograph from same day at 5:47 AM.    TECHNIQUE: Portable AP view of the chest.     FINDINGS:   Postsurgical changes of the chest. Median sternotomy wires appear  intact. Left chest wall implantable cardiac defibrillator present with  stable alignment of the leads. LVAD present. Surgical clips overlie  the right axillary region. Right upper extremity PICC tip terminates  in the high SVC. Enteric tube courses below level of diaphragm and  below the field of view. Interval removal of left basilar chest tube.  Coronary artery stents present.    Midline trachea. Stable enlarged cardiomediastinal silhouette.  Silhouetting of the right and left hemidiaphragms with bilateral  costophrenic angle blunting. Stable small bilateral pleural effusions.  No discernible pneumothorax. Stable bilateral perihilar/basilar hazy  opacities.      Impression    IMPRESSION:   1.  Interval removal of the left basilar chest tube. No definite  pneumothorax.  2.  Similar-appearing bilateral perihilar/basilar opacities and small  pleural effusions.  3.  Cardiomegaly.  4.  Stable support devices.    I have personally reviewed the examination and initial interpretation  and I agree with the findings.    CONSTANTINO OWEN MD         SYSTEM ID:  [id number]   XR Chest Port 1 View    Narrative    EXAM: XR Chest 1 view 7/25/2022 9:29 PM      HISTORY: concern for pneumothorax.    COMPARISON: Same-day radiograph of the chest at 1238.     TECHNIQUE: Frontal view of the chest.    FINDINGS: Right-sided PICC with tip in the mid SVC. Left sided  implantable cardiac defibrillator. LVAD. Enteric tube is  subdiaphragmatic with tip collimated from the study. Postoperative  changes of the chest and median sternotomy wires intact.  Trachea is midline. Cardiac mediastinal silhouette is stable. Coronary  artery stent. Mild interstitial pulmonary opacities. Small bilateral  pleural effusions. No pneumothoraces.       Impression    IMPRESSION:   1. No pneumothoraces.  2. Mild interstitial pulmonary opacities with bilateral small pleural  effusions, likely secondary to edema.    I have personally reviewed the examination and initial interpretation  and I agree with the findings.    YESENIA KEITH DO         SYSTEM ID:  [id number]   XR Chest Port 1 View    Narrative    XR CHEST PORT 1 VIEW  7/27/2022 5:38 AM      HISTORY: LVAD    COMPARISON: Chest x-ray 7/25/2022, 7/24/2022.    FINDINGS:   Portable AP 50 degree upright chest x-ray. Left chest wall ICD with  leads terminating over the right atrium and right ventricle.  Postsurgical changes in the chest. Median sternotomy wires are intact.  LVAD. Enteric tube course below the margin of the film. Right arm PICC  terminates over the low SVC.    Trachea is midline. Cardiac silhouette is enlarged. Pulmonary  vasculature is indistinct. Low lung volumes with grossly unchanged  bilateral perihilar interstitial opacities. Small bilateral pleural  effusions are unchanged. No appreciable pneumothorax.    Visualized osseous structures and soft tissues are unremarkable.      Impression    IMPRESSION:   1.  Cardiomegaly with unchanged bilateral perihilar opacities, likely  pulmonary edema.  2.  Small bilateral pleural effusions are unchanged.    I have personally reviewed the examination and initial interpretation  and I agree with the findings.    LAI HERNADEZ MD         SYSTEM ID:  [id number]   XR Chest Port 1 View    Narrative    XR CHEST PORT 1 VIEW  7/28/2022 1:15 AM      HISTORY: LVAD    COMPARISON: Chest x-ray 7/27/2022, 7/25/2022.    FINDINGS:   Portable AP 30 degree upright chest x-ray. Left chest wall ICD with  leads terminating over the right atrium and right ventricle.  Postsurgical changes of the chest. Median sternotomy wires are intact.  LVAD. Right arm PICC retracted with tip terminating over the  brachiocephalic confluence. Enteric tube projects below the margin of  the  film.    Trachea is midline. Cardiac silhouette is enlarged. Pulmonary  vasculature is indistinct. Increased bilateral mixed interstitial and  airspace opacities. Small right greater than left pleural effusions,  increased. No pneumothorax.    Visualized osseous structures and soft tissues are unremarkable.      Impression    IMPRESSION:   1.  Cardiomegaly with increased bilateral mixed interstitial and  airspace opacities suggestive for worsening pulmonary edema.  2.  Increased small right greater than left pleural effusions.  3.  Retracted right arm PICC with tip terminating over the  brachiocephalic confluence. Additional support devices in stable  position.    I have personally reviewed the examination and initial interpretation  and I agree with the findings.    LAI HERNADEZ MD         SYSTEM ID:  [id number]   XR Chest Port 1 View    Narrative    EXAM: XR CHEST PORT 1 VIEW  7/30/2022 9:21 AM     HISTORY:  interval, monitor right effusion       COMPARISON:  Chest radiograph 7/28/2022    FINDINGS:     Portable upright view of the chest. Right upper extremity PICC tip  projects over the mid SVC. Stable position of left chest wall  implantable cardiac defibrillator. LVAD in place. Intact median  sternotomy wires. Feeding tube tip courses below the diaphragm and  beyond the field of view. Intact medial sternotomy wires.    Trachea is midline. Stable cardiomegaly. Decreased aeration of the  lungs. No significant change in mixed airspace opacities. Increased  size of moderate right pleural effusion. Small left pleural effusion.  No appreciable pneumothorax.    No acute osseous abnormality. Visualized upper abdomen is  unremarkable.        Impression    IMPRESSION:     1. Cardiomegaly with stable pulmonary opacities, likely representing  edema. Low lung volumes.  2. Increased size of moderate right pleural effusion.  3. Right upper extremity PICC tip projects over the mid SVC. Other  support devices similar  position.    I have personally reviewed the examination and initial interpretation  and I agree with the findings.    LAI HERNADEZ MD         SYSTEM ID:  [id number]   XR Abdomen Port 1 View    Narrative    EXAM: XR ABDOMEN PORT 1 VIEW, 7/30/2022 11:30 AM    INDICATION: CONFIRM NJ PLACEMENT    COMPARISON: Abnormal radiographs 7/20/2022    FINDINGS:  Portable upright view of the abdomen. Feeding tube tip projects over  the expected location of the proximal jejunum.. Nonobstructive bowel  gas pattern. No free intraperitoneal air, pneumatosis or portal venous  gas. Similar position of IVC filter projecting over the location of  the IVC. No acute or suspicious bone abnormalities.     Visualized median sternotomy wires are intact. LVAD in place. The  position of pacemaker leads. Small pleural effusions, right greater  than left.       Impression    IMPRESSION:   1. Feeding tube tip project over expected location of the proximal  jejunum.  2. Nonobstructive bowel gas pattern.  3. Small bilateral pleural effusions, right greater than left.    I have personally reviewed the examination and initial interpretation  and I agree with the findings.    LAI HERNADEZ MD         SYSTEM ID:  [id number]   XR Chest Port 1 View    Narrative    EXAM: XR Chest 1 view 7/31/2022 8:24 AM      HISTORY: SOB.    COMPARISON: Previous day.     TECHNIQUE: Frontal view of the chest.    FINDINGS: Left chest wall ICD with leads in stable position. LVAD.  Enteric tube is subdiaphragmatic with tip coming up from the study.  Right-sided CVC with tip in the high SVC. Trachea is midline.  Cardiomediastinal silhouette is stable. Stable interstitial pulmonary  opacities and bibasilar pulmonary opacities. Persistent right moderate  pleural effusion. Possible small left pleural effusion. No  pneumothoraces.      Impression    IMPRESSION:     1. Stable interstitial and bibasilar pulmonary opacities, likely  pulmonary edema.  2. Persistent right moderate pleural  effusion.  3. Relatively unchanged support devices.    I have personally reviewed the examination and initial interpretation  and I agree with the findings.    JILL CARRANZA MD         SYSTEM ID:  [id number]   XR Abdomen Port 1 View    Narrative    EXAMINATION:  XR ABDOMEN PORT 1 VIEW 8/1/2022 4:37 AM     COMPARISON: 7/30/2022    HISTORY: NG tube withdrawn several cm by patient. Replaced by nursing,  evaluate positioning    TECHNIQUE: Frontal view of the abdomen.    FINDINGS: Feeding tube tip projects over the jejunum. Stable IVC  filter. Stable LVAD, pacemaker. Normal bowel gas pattern. Portion of a  tooth with crown project over the right lower quadrant. Cardiomegaly.  Bilateral basilar atelectasis and likely small pleural effusions.      Impression    IMPRESSION:   1. Feeding tube tip projects over the jejunum.  2. Portion of a tooth with a crown projects over the right lower  quadrant.  2. No obstructive bowel gas pattern.    I have personally reviewed the examination and initial interpretation  and I agree with the findings.    LAI HERNADEZ MD         SYSTEM ID:  [id number]   US Chest Pleural Effusion Imaging    Narrative    Exam: US CHEST/PLEURAL EFFUSION IMAGING, 8/1/2022 9:20 AM    Indication: Look for pleural effusion    Comparison: Chest radiograph 7/31/2022.    Findings:   Targeted ultrasound to evaluate for pleural effusions.  Small right  and trace left anechoic fluid collections.       Impression    Impression: Small right and trace left pleural effusions.    I have personally reviewed the examination and initial interpretation  and I agree with the findings.    JOSE M OSHEA MD         SYSTEM ID:  [id number]   IR Chest Tube Place Non Tunneled Right    Narrative    Procedure date: 8/1/2022.    History: The patient has a history of heart failure, status post  implantable cardiac defibrillator, LVAD, and RVAD placement. RVAD was  removed on 7/21/2022, patient now with worsening first of breath.  Recent  chest x-ray notable for a moderate right pleural effusion.  Chest tube placement was requested.    Preprocedure diagnosis: Right pleural effusion.    Post procedure diagnosis: Right pleural effusion, improved.    Informed consent was obtained from son via telephone, and the site  confirmed.    : Lm Mendoza PA-C.    Assist: LENA Maravilla.    Medications: 1% lidocaine, 8 cc subcutaneously.    Sedation face to face time: None.    Nursing: The patient was continuously monitored by IR nursing staff  under my supervision, and remained stable throughout the procedure.    Consent: The procedure, as well as risks and benefits was discussed  with the patient's son. Informed written and verbal consent was  obtained via telephone prior to the procedure.    Findings: Ultrasound was used to evaluate the posterior right chest.  An effusion was seen with the patient in the left lateral position.  The posterior right chest was then prepped and draped in usual sterile  fashion. Under ultrasound guidance, the tissues overlying the effusion  were locally anesthetized with subcutaneous administration of 1%  lidocaine, and a 3 mm skin incision was made with a # 11 blade. Under  ultrasound guidance, a 5 Fr., 10 cm Yueh needle/catheter combo was  advanced into the right pleural space, with return of clear serous  fluid. Upon return of fluid, the Yueh catheter was advanced off the  needle. Under ultrasound guidance, a 0.35 Bentson wire was advanced  through the catheter into the pleural space. Under ultrasound  guidance, the 5 Fr. Yueh catheter was exchanged over wire for a 14 Fr.  Qi dilator. Under ultrasound guidance the subcutaneous tissues were  dilated over wire. A new 14 [place] Non-locking Flexima J tipped chest  tube was selected and prepared. Under ultrasound guidance, the Qi  dilator was exchanged over wire for the 14 [place] Flexima J-tip chest  tube, which was advanced into the right pleural space.  Subsequent  ultrasound evaluation confirmed proper placement within the pleural  space. The catheter was secured to the skin with a single 2-0 Ethilon  suture,  and connected to water-seal drainage apparatus. The site was  cleansed and dressed with a sterile bandage. Images were saved  throughout the procedure. The procedure was well-tolerated, with no  immediate complications.     Contrast: None.    Fluoroscopy time: None.    Specimens: None.    Estimated blood loss: 0.5 cc.      Impression    Impression: Ultrasound guided placement of right-sided 14 Fr.  non-locking Flexima J-tipped chest tube. Return of clear serous fluid.    Plan: Patient transported to the inpatient care unit in stable  condition. Dressing changes per floor routine. Follow up per primary  team.    Attestation: The physician assistant (PA) who performed this procedure  and signed the above report is licensed to practice in the [facility] pursuant to [place] Statute 147A.09.  This includes meeting the  Statute and [place] Board of Medical [employer] requirement of an  active Delegation Agreement, which documents delegation of services by  primary and alternate supervising physicians. All services rendered  are performed under a collaborative agreement with Dr. Lakisha Hines, Director of Interventional Radiology, [facility]. Placement    LEIA LEMOS PA-C         SYSTEM ID:  [id number]   XR Chest Port 1 View    Narrative    Portable chest    INDICATION: Chest tube placement, air leak    COMPARISON: 7/31/2022    FINDINGS: Cardiomegaly. LVAD. Left subclavian transvenous approach  ICD. Median sternotomy. Right basilar chest catheter placement with  small right pneumothorax. No tension component. Right PICC tip in the  upper SVC. Feeding tube beyond the inferior margin of the image.  Decrease of right pleural effusion.      Impression    IMPRESSION: Small right pneumothorax with chest tube placement.  Decreased right  pleural effusion. Cardiomegaly. LVAD. ICD.    CONSTANTINO OWEN MD         SYSTEM ID:  [id number]   CT Head w/o Contrast    Narrative    EXAM: CT HEAD W/O CONTRAST  8/1/2022 3:43 PM     HISTORY:  AMS, recent fall, was on heparin       COMPARISON:  CT head, 5/19/2022    TECHNIQUE: Using multidetector thin collimation helical acquisition  technique, axial, coronal and sagittal CT images from the skull base  to the vertex were obtained without intravenous contrast.   (topogram) image(s) also obtained and reviewed.    FINDINGS:  No intracranial hemorrhage, mass effect, or midline shift. No acute  loss of gray-white matter differentiation in the cerebral hemispheres.  Moderate loss of cerebral volume. Ventricles are proportionate to the  cerebral sulci. Clear basal cisterns.    The bony calvaria and the bones of the skull base are normal. Mild  mucosal thickening in the right ethmoid air cells, retention cyst in  the left maxillary sinus, otherwise the visualized portions of the  paranasal sinuses and mastoid air cells are clear. Grossly normal  orbits.       Impression    IMPRESSION: No acute intracranial pathology. Moderate cerebral  atrophy.    I have personally reviewed the examination and initial interpretation  and I agree with the findings.    JORGE MORRELL MD         SYSTEM ID:  [id number]   XR Chest Port 1 View    Narrative    XR CHEST PORT 1 VIEW  8/2/2022 6:16 PM      HISTORY: interval change    COMPARISON: Chest x-ray 8/1/2022, 7/31/2022.    FINDINGS:   Portable AP 45 degree upright chest x-ray. Postsurgical changes in the  chest. Median sternotomy wires are intact. Right chest tube in stable  position. LVAD. Left chest wall ICD with leads terminating over the  right atrium and left ventricle. Right arm PICC terminates over the  low SVC. Enteric tube courses below the margin of the film.    Trachea is midline. Cardiac silhouette is enlarged. Pulmonary  vasculature is distinct. Stable bilateral perihilar  interstitial  opacities. Small bilateral pleural effusions are unchanged.       Impression    IMPRESSION:     1. Decrease in right-sided pneumothorax with chest tube in place.  2. Relatively unchanged pulmonary opacities and  small pleural  effusions.  3. Support devices in stable position.    I have personally reviewed the examination and initial interpretation  and I agree with the findings.    JILL CARRANZA MD         SYSTEM ID:  [id number]   XR Chest Port 1 View    Narrative    Portable chest    INDICATION: Interval change    COMPARISON: Yesterday    FINDINGS: Heart borderline enlarged. LVAD. Implantable cardiac  defibrillator. Right basilar chest catheter. Right PICC tip in the  proximal SVC. Median sternotomy. Feeding tube beyond the inferior  margin of the image. Previous right apical pneumothorax less  conspicuous.      Impression    IMPRESSION: Slight decrease in conspicuity of right apical  pneumothorax, otherwise grossly stable.    CONSTANTINO OWEN MD         SYSTEM ID:  [id number]   XR Chest Port 1 View    Narrative    XR CHEST PORT 1 VIEW  8/3/2022 6:12 PM      HISTORY: interval change    COMPARISON: Chest x-ray 8/3/2022, 8/20/2022.    FINDINGS:   Portable AP 70 degrees upright chest x-ray. Left chest wall ICD with  leads terminating over the right atrium, Right Ventricle, and Left  Ventricle. Post Surgical Changes in the Chest. Median Sternotomy Wires  Are Intact. LVAD. Right arm PICC terminates over the low SVC. Enteric  tube courses below the margin of film. Right basilar chest tube  removed.    Trachea is midline. Cardiac silhouette is enlarged. Pulmonary  vasculature is indistinct. Bibasilar opacities are unchanged. Trace  right apical pneumothorax. Small right greater than left pleural  effusions are unchanged. Aortic arch calcifications.    Visualized osseous structures and soft tissues are unremarkable.      Impression    IMPRESSION:   1.  Trace right apical pneumothorax, grossly unchanged  status post  right chest tube removal.  2.  Cardiomegaly with stable right greater than left basilar  opacities, pulmonary edema versus superimposed atelectasis.  3.  Small right greater than left pleural effusions are unchanged.    I have personally reviewed the examination and initial interpretation  and I agree with the findings.    RAHEEM MEAD MD         SYSTEM ID:  [id number]   XR Chest Port 1 View    Narrative    XR CHEST PORT 1 VIEW  8/7/2022 12:01 PM      HISTORY: ETT placement    COMPARISON: Chest x-ray 8/3/2022, 8/2/2022.    FINDINGS:   Portable AP supine chest x-ray. Post surgical changes chest. Median  sternotomy wires are intact. Left chest wall ICD with leads  terminating over the right atrium and left ventricle. LVAD.  Endotracheal tube terminates over the mid trachea. Enteric tube  removed. Right arm PICC removed.    Trachea is midline. Cardiac silhouette is enlarged. Pulmonary  vasculature is indistinct. Stable perihilar mixed interstitial  airspace opacities. Small bilateral pleural effusions are unchanged.  No pneumothorax.    Visualized osseous structures and soft tissues are unremarkable.      Impression    IMPRESSION:   1.  Endotracheal tube terminates over the mid trachea.  2.  Cardiomegaly with stable perihilar mixed interstitial and airspace  opacity suggestive for pulmonary edema.  3.  Small bilateral pleural effusions are unchanged.    I have personally reviewed the examination and initial interpretation  and I agree with the findings.    LAI HERNADEZ MD         SYSTEM ID:  [id number]   XR Abdomen Port 1 View    Narrative    EXAMINATION:  XR ABDOMEN PORT 1 VIEW 8/7/2022 12:02 PM     COMPARISON: none..    HISTORY: OG placement    TECHNIQUE: Frontal view of the abdomen.    FINDINGS: IVC stent. Right groin central catheter terminates over the  common iliac vein. Oral gastric tube is not visualized. Rectal  temperature probe. The small intestine and colon are not distended.  Portion of a  tooth now seen over the right upper quadrant, likely in  the hepatic flexure. No cholelithiasis on prior CT abdomen and pelvis.  No abnormal soft tissue densities. No definite free air.      Impression    IMPRESSION:   1.  Orogastric tube is not visualized, likely collimated out of view.  2.  Non-obstructed bowel gas pattern.  3.  Portion of a tooth with a metal filling, now seen in the hepatic  flexure.    I have personally reviewed the examination and initial interpretation  and I agree with the findings.    LAI HERNADEZ MD         SYSTEM ID:  [id number]   CT Head w/o Contrast    Narrative    EXAM: CT HEAD W/O CONTRAST  8/7/2022 1:12 PM     HISTORY:  Fall while on warfarin and some AMS       COMPARISON: 8/1/2022, 5/19/2022 head CT.    TECHNIQUE: Using multidetector thin collimation helical acquisition  technique, axial, coronal and sagittal CT images from the skull base  to the vertex were obtained without intravenous contrast.   (topogram) image(s) also obtained and reviewed.    FINDINGS:  No intracranial hemorrhage, mass effect, or midline shift. Scattered  subcortical, periventricular areas of white matter hypoattenuation.  Mild to moderate parenchymal volume loss, compensatory ventricular  dilatation. No acute loss of gray-white matter differentiation in the  cerebral hemispheres. Ventricles are proportionate to the cerebral  sulci. Clear basal cisterns.    The bony calvaria and the bones of the skull base are normal.  Scattered anterior ethmoid air cell and left sphenoid locule  opacification. Stable left polypoid maxillary sinus mucosal retention  cyst. Clear mastoid air cells Grossly normal orbits.       Impression    IMPRESSION:   1. No acute intracranial pathology.   2. Mild to moderate leukoaraiosis and parenchymal volume loss, likely  sequela of chronic small vessel ischemic disease.    I have personally reviewed the examination and initial interpretation  and I agree with the findings.    JORGE  MD PORSCHE         SYSTEM ID:  [id number]   XR Abdomen Port 1 View    Narrative    EXAMINATION:  XR ABDOMEN PORT 1 VIEW 8/7/2022 1:37 PM     COMPARISON: none..    HISTORY: OG placement    TECHNIQUE: Frontal view of the abdomen.    FINDINGS: Orogastric tube tip and sidehole projected over the stomach.  The small intestine and colon are not distended. No abnormal soft  tissue densities.  No free air, pneumatosis or portal venous gas.  Partially visualized possible changes of the chest. Median sternotomy  wires are intact. Left chest wall pacemaker leads terminating over the  right atrium and left ventricle. LVAD.    IVC stent. Tooth projecting over the right upper quadrant.      Impression    IMPRESSION:   1.  Orogastric tube tip and sidehole projecting over the stomach.  2.  Non-obstructed bowel gas pattern.  3.  Ingested tooth projecting over the right upper quadrant.    I have personally reviewed the examination and initial interpretation  and I agree with the findings.    SHAQ PRABHAKAR MD         SYSTEM ID:  [id number]   XR Chest Port 1 View    Narrative    EXAM: XR Chest 1 view 8/8/2022 1:10 AM      HISTORY: Intubated.    COMPARISON: Previous day.     TECHNIQUE: Frontal view of the chest.    FINDINGS: ET tube is in the mid thoracic trachea. Left chest wall  implantable cardiac defibrillator with leads in stable position. LVAD.  Enteric tube is subdiaphragmatic with collimated from the study.  Postsurgical changes of the chest with median sternotomy wires intact.  Coronary artery stents.  Trachea is midline. Cardiac and mediastinal silhouette is stable.  Decreased perihilar pulmonary opacities. Stable small bilateral  pleural effusions. No pneumothoraces.      Impression    IMPRESSION:   1. ET tube is in the mid thoracic trachea.  2. Decreased perihilar pulmonary opacities, suggesting improved edema  or atelectasis.  3. Stable small bilateral pleural effusions.    I have personally reviewed the examination and  initial interpretation  and I agree with the findings.    LAI HERNADEZ MD         SYSTEM ID:  [id number]   XR Chest Port 1 View    Narrative    EXAM: XR Chest 1 view 8/9/2022 1:08 AM      HISTORY: ICU pt, extubated on 8-8.    COMPARISON: Previous day.     TECHNIQUE: Frontal view of the chest.    FINDINGS: Left chest wall implantable cardiac defibrillator with leads  in stable position. LVAD. Post surgical changes of the chest with  median sternotomy wires intact. Interval extubation.  Trachea is midline. Cardiac and mediastinal silhouette is stable. Low  lung volumes. Stable perihilar pulmonary opacities. Stable small right  pleural effusion. Small left pleural effusion. No pneumothoraces.      Impression    IMPRESSION:   1. Low lung volumes with stable perihilar pulmonary opacities.  2. Stable small bilateral pleural effusions.  3. Status post extubation. Other support devices are stable.    I have personally reviewed the examination and initial interpretation  and I agree with the findings.    LAI HERNADEZ MD         SYSTEM ID:  [id number]   XR Chest Port 1 View    Narrative    EXAM: XR Chest 1 view 8/12/2022 6:37 AM      HISTORY: HFrEF, increased WOB, evaluate for pulmonary edema.    COMPARISON: Radiograph of the chest dated 8/9/2022     TECHNIQUE: Frontal view of the chest.    FINDINGS: Left chest wall implantable cardiac defibrillator with leads  in table position. LVAD. Postsurgical changes of the chest median  sternotomy wires intact.  Trachea is midline. Cardiac and mediastinal silhouette is stable.  Increased moderate right layering pleural effusion. Increased  bibasilar pulmonary opacities. Small left pleural effusion. No  pneumothoraces.      Impression    IMPRESSION:   1. Increased bibasilar pulmonary opacities, which represents  atelectasis, edema, or infection.  2. Increased moderate right pleural effusion and small left pleural  effusion    I have personally reviewed the examination and initial  interpretation  and I agree with the findings.    LAI HERNADEZ MD         SYSTEM ID:  [id number]   CTA Chest with Contrast    Impression    RESIDENT PRELIMINARY INTERPRETATION  Impression:  1. The LVAD outflow graft is widely patent throughout its course.  2. No substantial change in moderate sized hemopericardium.   Echo Limited    Narrative    [id number]  [id number]  [id number]  [id number]^LOLIS^ELVIN     [facility],[place]  Echocardiography Laboratory  [address] [id number]     Name: OLEG KAUR  MRN: [id number]  : 1960  Study Date: 2022 08:35 PM  Age: 61 yrs  Gender: Male  Patient Location: [facility]  Reason For Study: Tamponade  Ordering Physician: ELVIN DANIELLE  Performed By: Fam Davies RDCS     BSA: 1.8 m2  Height: 67 in  Weight: 157 lb  ______________________________________________________________________________  Procedure  Limited Portable Echo Adult. (Emergent exam, abbreviated study performed).  Poor quality two-dimensional was performed and interpreted. The final echo  results were communicated to Elvin Danielle at the bedside..  ______________________________________________________________________________  Interpretation Summary  s/p HM3 LVAD 22, s/p 29F Protek duo.  Global Left ventricular function cannot be assessed.  Global right ventricular function is moderately reduced.  No pericardial effusion is present.  ______________________________________________________________________________  Left Ventricle  Global Left ventricular function cannot be assessed.     Right Ventricle  Global right ventricular function is moderately reduced.     Pericardium  No pericardial effusion is present.     ______________________________________________________________________________  Report approved by: Tashia FLORES 2022 09:21 PM     ______________________________________________________________________________      Echo Complete     Value     LVEF  10-20% (severely reduced)    [facility]    [id number]  [id number]  [id number]  [id number]^DARRELL^MARCELLA^PAOLA     [facility],[place]  Echocardiography Laboratory  [address] [place] [id number]     Name: OLEG KAUR  MRN: [id number]  : 1960  Study Date: 2022 10:46 AM  Age: 61 yrs  Gender: Male  Patient Location: [facility]  Reason For Study: Heart Failure  Ordering Physician: MARCELLA RAMÍREZ  Performed By: Saba Prado     BSA: 1.9 m2  Height: 67 in  Weight: 179 lb  HR: 114  BP: 110/91 mmHg  ______________________________________________________________________________  Procedure  Complete Portable Echo Adult.  ______________________________________________________________________________  Interpretation Summary  HM3 LVAD at 5200 RPM and Protek Duo RVAD at 4.3L/min.  Normal LV size with severely reduced global LV function, LVEF<20%. LVIDd= 4.6  cm.  Normal RV size and mildly reduced RV function, RVFAC 31%. The ventricular  septum is midline.  The aortic valve opens minimally with each beat. There is no AI.  LVAD inflow cannula is visualized in the LV apex. Normal Doppler interrogation  of the LVAD inflow cannula.  LVAD outflow graft is not well assessed.  Organized material is present in the pericardial space, most prominently  anterior to the RV free wall. There is no evidence of pericardial tamponade.  This was also present on the 22 study, but is better imaged on today's  study.     This study was compared with the study from 22: Image quality is  significantly improved. There has otherwise been no significant change,  however the prior study was extremely limited.  ______________________________________________________________________________  Left Ventricle  Left ventricular size is normal. Diastolic function not assessed due to  presence of LVAD. Left ventricular function is decreased. The ejection  fraction is 10-20% (severely reduced). Severe  diffuse hypokinesis is present.  LVAD cannula was seen in the expected anatomic position in the LV apex.     Right Ventricle  The right ventricle is normal size. Global right ventricular function is  mildly reduced. RVFAC 31%.     Atria  The atria cannot be assessed.     Mitral Valve  The mitral valve is normal.     Aortic Valve  The aortic valve opens minimally with each beat. There is no AI.     Tricuspid Valve  Moderate tricuspid insufficiency is present. PA pressure cannot be accurately  assessed due to significant TR.     Pulmonic Valve  The valve leaflets are not well visualized. Trace pulmonic insufficiency is  present.     Vessels  The aorta root is normal. Dilation of the inferior vena cava is present with  normal respiratory variation in diameter. Unable to assess mean RA pressure  given the patient is on a ventilator.     Pericardium  Organized material is present in the pericardial space, most prominently  anterior to the RV free wall. There is no evidence of pericardial tamponade.  This was also present on the 22 study, but is better imaged on today's  study.     Compared to Previous Study  This study was compared with the study from 22 . Image quality is  significantly improved. There has otherwise been no significant change,  however the prior study was extremely limited.  ______________________________________________________________________________  Doppler Measurements & Calculations  TR max chacorta: 150.9 cm/sec  TR max P.1 mmHg     ______________________________________________________________________________  Report approved by: Nakia Prakash 2022 12:02 PM         Echo Limited     Value    LVEF  <30% (severely reduced)    [facility]    [id number]  [id number]  [id number]  [id number]^JORGE^EUGENIA^PERRY     [facility],[place]  Echocardiography Laboratory  [address] [place] [id number]     Name: OLEG AKUR  MRN: [id number]  :  1960  Study Date: 2022 07:09 AM  Age: 61 yrs  Gender: Male  Patient Location: [facility]  Reason For Study: Heart Failure; S/P RVAD decannulation  Ordering Physician: EUGENIA PATEL  Performed By: Saba Prado     BSA: 1.9 m2  Height: 67 in  Weight: 182 lb  HR: 117  ______________________________________________________________________________  Procedure  Limited Portable Echo Adult.  ______________________________________________________________________________  Interpretation Summary  HM3 LVAD at 5200 RPM     s/p Protek Duo RVAD decanulation .     Mobile echogenicity in the RV cavity suspicious for thrombus.  Left ventricular function is decreased. The ejection fraction is <30%  (severely reduced).  Global right ventricular function is borderline reduced.  2.5 cm echogenic dense structure anterior to the RV.  No pericardial effusion is present.  Moderate tricuspid insufficiency is present.     This study was compared with the study from 2022 .Mobile echogenicty in  RV cavity is new in this study. Otherwise no significant change.     Team informed at 8:10 am.  ______________________________________________________________________________  Left Ventricle  Left ventricular function is decreased. The ejection fraction is <30%  (severely reduced). Severe diffuse hypokinesis is present.     Right Ventricle  The right ventricle is normal size. Global right ventricular function is  borderline reduced.     Tricuspid Valve  Moderate tricuspid insufficiency is present.     Vessels  Dilation of the inferior vena cava is present with abnormal respiratory  variation in diameter.     Pericardium  No pericardial effusion is present.     Compared to Previous Study  This study was compared with the study from 2022 .  ______________________________________________________________________________  Doppler Measurements & Calculations  TR max chacorta: 152.5 cm/sec  TR max P.3 mmHg      ______________________________________________________________________________  Report approved by: Tashia FLORES 2022 08:11 AM         Echo Limited    Narrative    [id number]  [id number]  [id number]  [id number]^MONE^TARAS^EDUARD     [facility],[place]  Echocardiography Laboratory  [address] [place] [address]     Name: OLEG KAUR  MRN: [id number]  : 1960  Study Date: 2022 02:14 PM  Age: 61 yrs  Gender: Male  [facility]  Reason For Study: Other, Please Specify in Comments  Ordering Physician: TARAS SHORE  Performed By: Antonietta Yi RDCS     BSA: 1.9 m2  Height: 67 in  Weight: 173 lb  BP: 79/62 mmHg  ______________________________________________________________________________  Procedure  Limited Echocardiogram with portions of two-dimensional, color and spectral  Doppler performed.  ______________________________________________________________________________  Interpretation Summary  HM3 LVAD at 5200 RPM. s/p Protek Duo RVAD decanulation .  Large mobile RV mass, presumably thrombus, and appears attached to free wall.  No significant changes noted.  ______________________________________________________________________________  Left Ventricle  Outflow graft is not visualized.     Right Ventricle  Global right ventricular function is mildly reduced. A pacemaker lead is noted  in the right ventricle.     Mitral Valve  The mitral valve is normal.     Aortic Valve  Mild aortic insufficiency is present. AoV doesnt open.     Tricuspid Valve  Moderate tricuspid insufficiency is present.     Vessels  IVC diameter >2.1 cm collapsing <50% with sniff suggests a high RA pressure  estimated at 15 mmHg or greater.     Pericardium  Extensive pericardial hematoma noted.     Compared to Previous Study  No significant changes noted.     ______________________________________________________________________________  Report approved by: Nakia Shi  2022 03:09 PM     ______________________________________________________________________________      Echo Limited    Narrative    [id number]  [id number]  [id number]  [id number]^ZACHARIAH^ISHMAEL^PERRY     [facility],[place]  Echocardiography Laboratory  [address] [place] [id number]     Name: OLEG KAUR  MRN: [id number]  : 1960  Study Date: 2022 08:53 AM  Age: 61 yrs  Gender: Male  Patient Location: [facility]  Reason For Study: Other, Please Specify in Comments  Ordering Physician: ISHMAEL SONI  Performed By: Imelda Pratt     BSA: 1.8 m2  Height: 67 in  Weight: 151 lb  ______________________________________________________________________________  Procedure  Limited Portable Echo Adult. Poor acoustic windows.  ______________________________________________________________________________  Interpretation Summary  HM3 LVAD at 5300 RPM.  Limited echo to assess RV thrombus.  No thrombus noted in the right ventricle.  Global right ventricular function is normal.  Dilation of the inferior vena cava is present with abnormal respiratory  variation in diameter.  No pericardial effusion is present.     This study was compared with the study from 22. The RV thrombus is no  longer visualized.  ______________________________________________________________________________  Left Ventricle  LVAD cannula was seen in the expected anatomic position in the LV apex.     Right Ventricle  The right ventricle is normal size. Global right ventricular function is  normal. A pacemaker lead is noted in the right ventricle. No thrombus noted in  the right ventricle.     Tricuspid Valve  The tricuspid valve is normal. Mild tricuspid insufficiency is present.     Pulmonic Valve  The pulmonic valve is normal.     Vessels  Dilation of the inferior vena cava is present with abnormal respiratory  variation in diameter.     Pericardium  No pericardial effusion is present.     Compared to Previous  Study  This study was compared with the study from 22 .     ______________________________________________________________________________  Report approved by: Nakia Ewing 2022 11:07 AM     ______________________________________________________________________________      Echo Complete     Value    LVEF  <20%    Narrative    [id number]  [id number]  [id number]  [id number]^SOPHIA^LEIA     [facility],[place]  Echocardiography Laboratory  [address] [place] [address]     Name: OLEG KAUR  MRN: [id number]  : 1960  Study Date: 2022 11:58 AM  Age: 61 yrs  Gender: Male  Patient Location[place] [facility]  Reason For Study: Heart Failure  Ordering Physician: LEIA CORTES  Performed By: Mamta Zeng     BSA: 1.7 m2  Height: 67 in  Weight: 132 lb  BP: 99/87 mmHg  ______________________________________________________________________________  Procedure  LVAD Echocardiogram with two-dimensional, color and spectral Doppler  performed.  ______________________________________________________________________________  Interpretation Summary  LVAD cannula was seen in the expected anatomic position in the LV apex.  HM3 at 5300RPM.  Septum normal.  Aortic valve remain closed. Mild AI.  Normal outflow velocity.  Global right ventricular function is mildly reduced.  The inferior vena cava is normal.  No pericardial effusion is present.  Soft tissue density in pericardial cavity along RV apex,possible blood clot.  No hemodynamic effect.  ______________________________________________________________________________  Left Ventricle  The visual ejection fraction is <20%. LVAD cannula was seen in the expected  anatomic position in the LV apex. HM3 at 5300RPM.  Septum normal.  Aortic valve remain closed. Mild AI.  Normal outflow velocity.     Right Ventricle  Global right ventricular function is mildly reduced. A pacemaker lead is noted  in the right ventricle.     Mitral  Valve  The mitral valve is normal.     Aortic Valve  Mild aortic insufficiency is present.     Tricuspid Valve  The tricuspid valve is normal. Mild tricuspid insufficiency is present. The  right ventricular systolic pressure is approximated at 8.3 mmHg plus the right  atrial pressure.     Pulmonic Valve  The pulmonic valve is normal.     Vessels  The thoracic aorta is normal. The pulmonary artery and bifurcation cannot be  assessed. The inferior vena cava is normal.     Pericardium  No pericardial effusion is present. Soft tissue density in pericardial cavity  along RV apex,possible blood clot. No hemodynamic effect.     ______________________________________________________________________________  MMode/2D Measurements & Calculations  Ao root diam: 3.1 cm  LA dimension: 2.8 cm  asc Aorta Diam: 3.0 cm  LA/Ao: 0.90     Doppler Measurements & Calculations  TR max chacorta: 144.0 cm/sec  TR max P.3 mmHg     ______________________________________________________________________________  Report approved by: Nakia Sorto 2022 12:51 PM         Echo Limited     Value    LVEF  10-15% (severely reduced)    [facility]    [id number]  [id number]  [id number]  [id number]^KAZ^VICTORIA     [facility],[place]  Echocardiography Laboratory  [address] [place] [id number]     Name: OLEG KAUR  MRN: [id number]  : 1960  Study Date: 2022 09:49 AM  Age: 61 yrs  Gender: Male  Patient Location: [facility]  Reason For Study: Heart Failure  Ordering Physician: VICTORIA MCCURDY  Performed By: Kiki Prabhakar     BSA: 1.7 m2  Height: 67 in  Weight: 132 lb  HR: 97  BP: 113/78 mmHg  ______________________________________________________________________________  Procedure  Limited Portable Echo Adult.  ______________________________________________________________________________  Interpretation Summary  Limited LVAD TTE for HM3 at 5200 rpm.  Left ventricular function is decreased. The ejection fraction  is 10-15%  (severely reduced). Severe diffuse hypokinesis is present. The LVAD inflow  cannula is seen in the apex. It is not approximated to the septum. The  interventricular septum is in the midline. The inflow (50 cm/s) cannula  velocities are somewhat reduced. The outflow cannula (110 cm/s) velocities are  unremarkable.  Global right ventricular function is mildly reduced. Mild right ventricular  dilation is present.  No thrombus is seen within the RV. No echogenic areas are seen outside the RV.  The AV remains closed throughout the cardiac cycle. There is mild, continuous  aortic regurgitation.  IVC diameter and respiratory changes fall into an intermediate range  suggesting an RA pressure of 8 mmHg.  This study was compared with the study from 08/19/2022. The inflow cannula  velocities are lower. There is no RV thrombus or echogenic mass outside the  RV. There are no changes in the RV size or function.  ______________________________________________________________________________  Left Ventricle  LVEDD is 4.6 cm. Left ventricular function is decreased. The ejection fraction  is 10-15% (severely reduced). Diastolic function not assessed due to presence  of LVAD. Severe diffuse hypokinesis is present. The LVAD inflow cannula is  seen in the apex. It is not approximated to the septum. The interventricular  septum is in the midline. The inflow (50 cm/s) cannula velocities are somewhat  reduced. The outflow cannula (110 cm/s) velocities are unremarkable.     Right Ventricle  Mild right ventricular dilation is present. Global right ventricular function  is mildly reduced. No thrombus is seen within the RV. No echogenic areas are  seen outside the RV. A pacemaker lead is noted in the right ventricle.     Mitral Valve  The mitral valve is normal. Trace mitral insufficiency is present.     Aortic Valve  The AV remains closed throughout the cardiac cycle. There is mild, continuous  aortic regurgitation.     Tricuspid  Valve  The valve leaflets are not well visualized. Mild tricuspid insufficiency is  present.     Pulmonic Valve  The pulmonic valve cannot be assessed.     Vessels  The aorta root is normal. The thoracic aorta is normal. IVC diameter and  respiratory changes fall into an intermediate range suggesting an RA pressure  of 8 mmHg.     Pericardium  No pericardial effusion is present.     Compared to Previous Study  This study was compared with the study from 08/19/2022 . The inflow cannula  velocities are lower. There is no RV thrombus or echogenic mass outside the  RV. There are no changes in the RV size or function.     ______________________________________________________________________________  MMode/2D Measurements & Calculations  IVSd: 0.86 cm  LVIDd: 4.4 cm  LVIDs: 4.2 cm  LVPWd: 0.80 cm  FS: 3.9 %     LV mass(C)d: 114.5 grams  LV mass(C)dI: 67.6 grams/m2  RWT: 0.36     ______________________________________________________________________________  Report approved by: Nakia Altman 08/20/2022 10:58 AM         Cardiac Catheterization    Narrative      Successful Powersite gilma catheter placement through the right internal   jugular vein and 50 cc intra-aortic balloon pump insertion through the   right common femoral artery.      Cardiac Catheterization    Narrative      Right sided filling pressures are normal.    Moderately elevated pulmonary artery hypertension.    Left sided filling pressures are moderately elevated.    Reduced cardiac output level.    Hemodynamic data has been modified in Epic per physician review.     1.  Moderate pulmonary hypertension  2.  Reduced cardiac output index 2.2 by TD CO and 2.06 by Marley while on   IABP support  3.  Moderately elevated left-sided filling pressures, normal right-sided   filling pressures     Cardiac Catheterization    Narrative      Subclavian IABP advanced distal to aortic arch under fluoroscopy.      Cardiac Device Check - Inpatient     Value    Date Time  Interrogation Session [id number]    Implantable Pulse Generator  [employer]    Implantable Pulse Generator Model [id number] Evera XT DR    Implantable Pulse Generator Serial Number [id number]    Type Interrogation Session In Clinic    Clinic Name [facility]    Implantable Pulse Generator Type Defibrillator    Implantable Pulse Generator Implant Date 20180412    Implantable Lead  Medtronic    Implantable Lead Model 5076 CapSureFix Novus    Implantable Lead Serial Number [id number]    Implantable Lead Implant Date 20060306    Implantable Lead Polarity Type Bipolar Lead    Implantable Lead Location Detail 1 UNKNOWN    Implantable Lead Location Right Atrium    Implantable Lead  Medtronic    Implantable Lead Model 6949 Sprint [place]    Implantable Lead Serial Number [id number]    Implantable Lead Implant Date 20060306    Implantable Lead Polarity Type Bipolar Lead    Implantable Lead Location Detail 1 UNKNOWN    Implantable Lead Location Right Ventricle    Marcos Setting Mode (NBG Code) MVP_AAI_DDD    Marcos Setting Lower Rate Limit 50    Marcos Setting Maximum Tracking Rate 130    Marcos Setting Maximum Sensor Rate 115    Marcos Setting Hysterisis Rate DISABLED    Marcos Setting TRISTEN Delay Low 350    Marcos Setting PAV Delay Low 350    Marcos Setting AT Mode Switch Rate 171    Lead Channel Setting Sensing Polarity Bipolar    Lead Channel Setting Sensing Anode Location Right Atrium    Lead Channel Setting Sensing Anode Terminal Ring    Lead Channel Setting Sensing Cathode Location Right Atrium    Lead Channel Setting Sensing Cathode Terminal Tip    Lead Channel Setting Sensing Sensitivity 0.3    Lead Channel Setting Sensing Polarity Bipolar    Lead Channel Setting Sensing Anode Location Right Ventricle    Lead Channel Setting Sensing Anode Terminal Ring    Lead Channel Setting Sensing Cathode Location Right Ventricle    Lead Channel Setting Sensing Cathode Terminal Tip     Lead Channel Setting Sensing Sensitivity 0.3    Lead Channel Setting Pacing Polarity Bipolar    Lead Channel Setting Pacing Anode Location Right Atrium    Lead Channel Setting Pacing Anode Terminal Ring    Lead Channel Setting Sensing Cathode Location Right Atrium    Lead Channel Setting Sensing Cathode Terminal Tip    Lead Channel Setting Pacing Pulse Width 0.4    Lead Channel Setting Pacing Amplitude 1.5    Lead Channel Setting Pacing Capture Mode Adaptive    Lead Channel Setting Pacing Polarity Bipolar    Lead Channel Setting Pacing Anode Location Right Ventricle    Lead Channel Setting Pacing Anode Terminal Ring    Lead Channel Setting Sensing Cathode Location Right Ventricle    Lead Channel Setting Sensing Cathode Terminal Tip    Lead Channel Setting Pacing Pulse Width 0.4    Lead Channel Setting Pacing Amplitude 2.25    Lead Channel Setting Pacing Capture Mode Adaptive    Zone Setting Type Category VF    Zone Setting Detection Interval 320    Zone Setting Detection Beats Numerator 30    Zone Setting Detection Beats Denominator 40    Zone Setting Type Category VT    Zone Setting Detection Interval 280    Zone Setting Type Category VT    Zone Setting Detection Interval 350    Zone Setting Type Category VT    Zone Setting Detection Interval 400    Zone Setting Type Category ATRIAL_FIBRILLATION    Zone Setting Type Category AT/AF    Zone Setting Detection Interval 350    Lead Channel Impedance Value 342    Lead Channel Sensing Intrinsic Amplitude 0.9    Lead Channel Pacing Threshold Amplitude 0.5    Lead Channel Pacing Threshold Pulse Width 0.4    Lead Channel Impedance Value 399    Lead Channel Impedance Value 304    Lead Channel Sensing Intrinsic Amplitude 11.3    Lead Channel Pacing Threshold Amplitude 1    Lead Channel Pacing Threshold Pulse Width 0.4    Battery Date Time of Measurements [id number]    Battery Status OK    Battery RRT Trigger 2.727    Battery Remaining Longevity 65    Battery Voltage  2.98    Capacitor Charge Type Reformation    Capacitor Last Charge Date Time [id number]    Capacitor Charge Time 3.973    Capacitor Charge Energy 18    Marcos Statistic Date Time Start 20220518120059    Marcos Statistic Date Time End 58883815918785    Marcos Statistic RA Percent Paced 0.03    Marcos Statistic RV Percent Paced 0.07    Marcos Statistic AP  Percent 0    Marcos Statistic AS  Percent 0.06    Marcos Statistic AP VS Percent 0.03    Marcos Statistic AS VS Percent 99.91    Atrial Tachy Statistic Date Time Start 20220518120059    Atrial Tachy Statistic Date Time End 57399897621426    Atrial Tachy Statistic AT/AF [place] Percent 0    Therapy Statistic Recent Shocks Delivered 0    Therapy Statistic Recent Shocks Aborted 0    Therapy Statistic Recent ATP Delivered 0    Therapy Statistic Recent Date Time Start 20220518120059    Therapy Statistic Recent Date Time End 53348977285901    Therapy Statistic Total Shocks Delivered 1    Therapy Statistic Total Shocks Aborted 0    Therapy Statistic Total ATP Delivered 0    Therapy Statistic Total  Date Time Start 78768536931220    Therapy Statistic Total  Date Time End 16896798004573    Episode Statistic Recent Count 0    Episode Statistic Type Category AT/AF    Episode Statistic Recent Count 0    Episode Statistic Type Category SVT    Episode Statistic Recent Count 1    Episode Statistic Type Category VT    Episode Statistic Recent Count 0    Episode Statistic Type Category VF    Episode Statistic Recent Count 0    Episode Statistic Type Category VT    Episode Statistic Recent Count 0    Episode Statistic Type Category VT    Episode Statistic Recent Count 0    Episode Statistic Type Category VT    Episode Statistic Recent Date Time Start 90358734085562    Episode Statistic Recent Date Time End 25570833059840    Episode Statistic Recent Date Time Start 00405682698590    Episode Statistic Recent Date Time End 62616323501126    Episode Statistic Recent Date Time Start  59609643228448    Episode Statistic Recent Date Time End 46019975720175    Episode Statistic Recent Date Time Start 50326079486126    Episode Statistic Recent Date Time End 60406016888178    Episode Statistic Recent Date Time Start 61902462447191    Episode Statistic Recent Date Time End 10860349046493    Episode Statistic Recent Date Time Start 46614282687017    Episode Statistic Recent Date Time End 00068060334684    Episode Statistic Recent Date Time Start 90640522064086    Episode Statistic Recent Date Time End 45475219426638    Episode Statistic Total Count 2    Episode Statistic Type Category AT/AF    Episode Statistic Total Count 0    Episode Statistic Type Category SVT    Episode Statistic Total Count 9    Episode Statistic Type Category VT    Episode Statistic Total Count 1    Episode Statistic Type Category VF    Episode Statistic Total Count 0    Episode Statistic Type Category VT    Episode Statistic Total Count 0    Episode Statistic Type Category VT    Episode Statistic Total Count 1    Episode Statistic Type Category VT    Episode Statistic Total Date Time Start 17183880962396    Episode Statistic Total Date Time End 31242950942937    Episode Statistic Total Date Time Start 05237215262768    Episode Statistic Total Date Time End 77381313850559    Episode Statistic Total Date Time Start 23306914293054    Episode Statistic Total Date Time End 45501264975285    Episode Statistic Total Date Time Start 53526694593456    Episode Statistic Total Date Time End 63945854208794    Episode Statistic Total Date Time Start 02121162218291    Episode Statistic Total Date Time End 96815530860499    Episode Statistic Total Date Time Start 19392841144811    Episode Statistic Total Date Time End 10882011838850    Episode Statistic Total Date Time Start 19533273083575    Episode Statistic Total Date Time End 60319060644984    Episode Identifier 25    Episode Type Category VT    Episode Date Time [id number]    Episode  Duration 1    Narrative    Device: Medtronic [id number] Evera XT   Normal Device Function.  Mode: AAI<=>DDD  bpm  AP: <0.1%  : <0.1%  Intrinsic rhythm: SR @ 82 bpm  Short V-V intervals: 0  Thoracic Impedance: Below reference line, suggesting possible fluid   accumulation.  Lead Trends Appear Stable: Yes  Estimated battery longevity to RRT = 5.4 years. Battery voltage = 2.98 V.  Atrial arrhythmia: Device records 3 AT/AF episodes lasting < 1 min each   with a total time in AT/AF = 5 seconds. No EGMs for further review.  AF burden: <0.1%  Anticoagulant: Heparin gtt  Ventricular Arrhythmia: 1 VT-NS episode recorded, lasting 1 second, at 200   bpm. This episode occurred on 6/6/22. No recent episodes correlating with   rhythm seen on telemetry today by primary RN.   Setting changes: None    Plan: Continue inpatient evaluation and treatment.  RICKIE Mcpherson RN    Dual lead ICD    I have reviewed and interpreted the device interrogation, settings,   programming and nurse's summary. The device is functioning within normal   device parameters. I agree with the current findings, assessment and plan.   Cardiac Device Check - Inpatient     Value    Date Time Interrogation Session [id number]    Implantable Pulse Generator  Medtronic    Implantable Pulse Generator Model [id number] Eliseo XT     Implantable Pulse Generator Serial Number [id number]    Type Interrogation Session In Clinic    Clinic Name [facility] [place]    Implantable Pulse Generator Type Defibrillator    Implantable Pulse Generator Implant Date 20180412    Implantable Lead  Medtronic    Implantable Lead Model 5076 CapSureFix Novus    Implantable Lead Serial Number [id number]    Implantable Lead Implant Date 20060306    Implantable Lead Polarity Type Bipolar Lead    Implantable Lead Location Detail 1 UNKNOWN    Implantable Lead Location Right Atrium    Implantable Lead  Medtronic    Implantable Lead Model  6949 [address]    Implantable Lead Serial Number [id number]    Implantable Lead Implant Date 20060306    Implantable Lead Polarity Type Bipolar Lead    Implantable Lead Location Detail 1 UNKNOWN    Implantable Lead Location Right Ventricle    Marcos Setting Mode (NBG Code) DDD    Marcos Setting Lower Rate Limit 80    Marcos Setting Maximum Tracking Rate 130    Marcos Setting Maximum Sensor Rate 115    Marcos Setting Hysterisis Rate DISABLED    Marcos Setting TRISTEN Delay Low 350    Marcos Setting PAV Delay Low 350    Marcos Setting AT Mode Switch Rate 171    Lead Channel Setting Sensing Polarity Bipolar    Lead Channel Setting Sensing Anode Location Right Atrium    Lead Channel Setting Sensing Anode Terminal Ring    Lead Channel Setting Sensing Cathode Location Right Atrium    Lead Channel Setting Sensing Cathode Terminal Tip    Lead Channel Setting Sensing Sensitivity 0.3    Lead Channel Setting Sensing Polarity Bipolar    Lead Channel Setting Sensing Anode Location Right Ventricle    Lead Channel Setting Sensing Anode Terminal Ring    Lead Channel Setting Sensing Cathode Location Right Ventricle    Lead Channel Setting Sensing Cathode Terminal Tip    Lead Channel Setting Sensing Sensitivity 0.3    Lead Channel Setting Pacing Polarity Bipolar    Lead Channel Setting Pacing Anode Location Right Atrium    Lead Channel Setting Pacing Anode Terminal Ring    Lead Channel Setting Sensing Cathode Location Right Atrium    Lead Channel Setting Sensing Cathode Terminal Tip    Lead Channel Setting Pacing Pulse Width 0.4    Lead Channel Setting Pacing Amplitude 1.5    Lead Channel Setting Pacing Capture Mode Adaptive    Lead Channel Setting Pacing Polarity Bipolar    Lead Channel Setting Pacing Anode Location Right Ventricle    Lead Channel Setting Pacing Anode Terminal Ring    Lead Channel Setting Sensing Cathode Location Right Ventricle    Lead Channel Setting Sensing Cathode Terminal Tip    Lead Channel Setting Pacing Pulse Width  0.4    Lead Channel Setting Pacing Amplitude 2.25    Lead Channel Setting Pacing Capture Mode Adaptive    Zone Setting Type Category VF    Zone Setting Detection Interval 320    Zone Setting Detection Beats Numerator 30    Zone Setting Detection Beats Denominator 40    Zone Setting Type Category VT    Zone Setting Detection Interval 280    Zone Setting Type Category VT    Zone Setting Detection Interval 350    Zone Setting Type Category VT    Zone Setting Detection Interval 400    Zone Setting Type Category ATRIAL_FIBRILLATION    Zone Setting Type Category AT/AF    Zone Setting Detection Interval 350    Lead Channel Impedance Value 304    Lead Channel Sensing Intrinsic Amplitude 1.4    Lead Channel Pacing Threshold Amplitude 0.5    Lead Channel Pacing Threshold Pulse Width 0.4    Lead Channel Impedance Value 399    Lead Channel Impedance Value 285    Lead Channel Sensing Intrinsic Amplitude 11.1    Lead Channel Pacing Threshold Amplitude 1.00    Lead Channel Pacing Threshold Pulse Width 0.4    Battery Date Time of Measurements 20220623073722    Battery Status OK    Battery RRT Trigger 2.727    Battery Remaining Longevity 61    Battery Voltage 2.97    Capacitor Charge Type Reformation    Capacitor Last Charge Date Time 23660680526633    Capacitor Charge Time 3.973    Capacitor Charge Energy 18    Marcos Statistic Date Time Start 80601244566138    Marcos Statistic Date Time End 20220623073627    Marcos Statistic RA Percent Paced 0.01    Marcos Statistic RV Percent Paced 0.07    Marcos Statistic AP  Percent 0    Marcos Statistic AS  Percent 0.07    Marcos Statistic AP VS Percent 0.01    Marcos Statistic AS VS Percent 99.92    Atrial Tachy Statistic Date Time Start 02353206874901    Atrial Tachy Statistic Date Time End 20220623073627    Atrial Tachy Statistic AT/AF [place] Percent 0    Therapy Statistic Recent Shocks Delivered 0    Therapy Statistic Recent Shocks Aborted 0    Therapy Statistic Recent ATP Delivered 0     Therapy Statistic Recent Date Time Start 57332714382455    Therapy Statistic Recent Date Time End 58685036956369    Therapy Statistic Total Shocks Delivered 1    Therapy Statistic Total Shocks Aborted 0    Therapy Statistic Total ATP Delivered 0    Therapy Statistic Total  Date Time Start 02586706743605    Therapy Statistic Total  Date Time End 71732656788256    Episode Statistic Recent Count 0    Episode Statistic Type Category AT/AF    Episode Statistic Recent Count 0    Episode Statistic Type Category SVT    Episode Statistic Recent Count 0    Episode Statistic Type Category VT    Episode Statistic Recent Count 0    Episode Statistic Type Category VF    Episode Statistic Recent Count 0    Episode Statistic Type Category VT    Episode Statistic Recent Count 0    Episode Statistic Type Category VT    Episode Statistic Recent Count 0    Episode Statistic Type Category VT    Episode Statistic Recent Date Time Start 0196175590    Episode Statistic Recent Date Time End 5271196114    Episode Statistic Recent Date Time Start 84542260229363    Episode Statistic Recent Date Time End 45148706915571    Episode Statistic Recent Date Time Start 68604612190768    Episode Statistic Recent Date Time End 16633170117071    Episode Statistic Recent Date Time Start 02167402584516    Episode Statistic Recent Date Time End 02367423241682    Episode Statistic Recent Date Time Start 31607796367407    Episode Statistic Recent Date Time End 30689673299586    Episode Statistic Recent Date Time Start 13579793182417    Episode Statistic Recent Date Time End 42358194407684    Episode Statistic Recent Date Time Start 6061961888    Episode Statistic Recent Date Time End 02262771085327    Episode Statistic Total Count 2    Episode Statistic Type Category AT/AF    Episode Statistic Total Count 0    Episode Statistic Type Category SVT    Episode Statistic Total Count 9    Episode Statistic Type Category VT    Episode Statistic Total  Count 1    Episode Statistic Type Category VF    Episode Statistic Total Count 0    Episode Statistic Type Category VT    Episode Statistic Total Count 0    Episode Statistic Type Category VT    Episode Statistic Total Count 1    Episode Statistic Type Category VT    Episode Statistic Total Date Time Start 20180412151624    Episode Statistic Total Date Time End 20220623073627    Episode Statistic Total Date Time Start 20180412151624    Episode Statistic Total Date Time End 20220623073627    Episode Statistic Total Date Time Start 20180412151624    Episode Statistic Total Date Time End 20220623073627    Episode Statistic Total Date Time Start 20180412151624    Episode Statistic Total Date Time End 20220623073627    Episode Statistic Total Date Time Start 20180412151624    Episode Statistic Total Date Time End 20220623073627    Episode Statistic Total Date Time Start 20180412151624    Episode Statistic Total Date Time End 20220623073627    Episode Statistic Total Date Time Start 20180412151624    Episode Statistic Total Date Time End 20220623073627    Narrative    Patient seen in OR [address] [place] [address] for evaluation and iterative programming of   their ICD per MD orders pre-op balloon pump insertion per Anesthesia   request.     Device: Pheed [id number] Evera XT   Normal device function.  Mode: AAI<=>DDD  bpm  AP: <0.1%  : <0.1%  Intrinsic rhythm: SR @ 97 bpm  Thoracic Impedance: Below reference line, suggesting possible   intrathoracic fluid accumulation.  Short V-V intervals: 0  Lead Trends Appear Stable: Yes  Estimated battery longevity to RRT = 5.1 years. Battery voltage = 2.97 V.   Atrial Arrhythmia: None  AF Hobbs: 0%  Anticoagulant: Heparin gtt  Ventricular Arrhythmia: None  Setting Changes: Mode changed to DDD  bpm per Anesthesia. Tachy   therapies programmed OFF.    Plan: Page Device RN post-op for reprogramming.  RICKIE Mcpherson, LITA    Dual lead ICD    I have reviewed and interpreted the device  interrogation, settings,   programming and nurse's summary. The device is functioning within normal   device parameters. I agree with the current findings, assessment and plan.   Cardiac Device Check - Inpatient     Value    Date Time Interrogation Session [id number]    Implantable Pulse Generator  Medtronic    Implantable Pulse Generator Model [id number] Evera XT DR    Implantable Pulse Generator Serial Number [id number]    Type Interrogation Session In Clinic    Clinic Name [facility]    Implantable Pulse Generator Type Defibrillator    Implantable Pulse Generator Implant Date 20180412    Implantable Lead  Medtronic    Implantable Lead Model 5076 CapSureFix Novus    Implantable Lead Serial Number [id number]    Implantable Lead Implant Date 20060306    Implantable Lead Polarity Type Bipolar Lead    Implantable Lead Location Detail 1 UNKNOWN    Implantable Lead Location Right Atrium    Implantable Lead  Medtronic    Implantable Lead Model 6949 Sprint [place]    Implantable Lead Serial Number [id number]    Implantable Lead Implant Date 20060306    Implantable Lead Polarity Type Bipolar Lead    Implantable Lead Location Detail 1 UNKNOWN    Implantable Lead Location Right Ventricle    Marcos Setting Mode (NBG Code) MVP_AAI_DDD    Marcos Setting Lower Rate Limit 50    Marcos Setting Maximum Tracking Rate 130    Marcos Setting Maximum Sensor Rate 115    Marcos Setting Hysterisis Rate DISABLED    Marcos Setting TRISTEN Delay Low 350    Marcos Setting PAV Delay Low 350    Marcos Setting AT Mode Switch Rate 171    Lead Channel Setting Sensing Polarity Bipolar    Lead Channel Setting Sensing Anode Location Right Atrium    Lead Channel Setting Sensing Anode Terminal Ring    Lead Channel Setting Sensing Cathode Location Right Atrium    Lead Channel Setting Sensing Cathode Terminal Tip    Lead Channel Setting Sensing Sensitivity 0.3    Lead Channel Setting Sensing Polarity  Bipolar    Lead Channel Setting Sensing Anode Location Right Ventricle    Lead Channel Setting Sensing Anode Terminal Ring    Lead Channel Setting Sensing Cathode Location Right Ventricle    Lead Channel Setting Sensing Cathode Terminal Tip    Lead Channel Setting Sensing Sensitivity 0.3    Lead Channel Setting Pacing Polarity Bipolar    Lead Channel Setting Pacing Anode Location Right Atrium    Lead Channel Setting Pacing Anode Terminal Ring    Lead Channel Setting Sensing Cathode Location Right Atrium    Lead Channel Setting Sensing Cathode Terminal Tip    Lead Channel Setting Pacing Pulse Width 0.4    Lead Channel Setting Pacing Amplitude 1.5    Lead Channel Setting Pacing Capture Mode Adaptive    Lead Channel Setting Pacing Polarity Bipolar    Lead Channel Setting Pacing Anode Location Right Ventricle    Lead Channel Setting Pacing Anode Terminal Ring    Lead Channel Setting Sensing Cathode Location Right Ventricle    Lead Channel Setting Sensing Cathode Terminal Tip    Lead Channel Setting Pacing Pulse Width 0.4    Lead Channel Setting Pacing Amplitude 2    Lead Channel Setting Pacing Capture Mode Adaptive    Zone Setting Type Category VF    Zone Setting Detection Interval 320    Zone Setting Detection Beats Numerator 30    Zone Setting Detection Beats Denominator 40    Zone Setting Type Category VT    Zone Setting Detection Interval 280    Zone Setting Type Category VT    Zone Setting Detection Interval 350    Zone Setting Type Category VT    Zone Setting Detection Interval 400    Zone Setting Type Category ATRIAL_FIBRILLATION    Zone Setting Type Category AT/AF    Zone Setting Detection Interval 350    Lead Channel Impedance Value 342    Lead Channel Sensing Intrinsic Amplitude 1.3    Lead Channel Pacing Threshold Amplitude 0.5    Lead Channel Pacing Threshold Pulse Width 0.4    Lead Channel Impedance Value 418    Lead Channel Impedance Value 304    Lead Channel Sensing Intrinsic Amplitude 13    Lead Channel  Pacing Threshold Amplitude 1.00    Lead Channel Pacing Threshold Pulse Width 0.4    Battery Date Time of Measurements 27821524210319    Battery Status OK    Battery RRT Trigger 2.727    Battery Remaining Longevity 63    Battery Voltage 2.98    Capacitor Charge Type Reformation    Capacitor Last Charge Date Time 17429387042692    Capacitor Charge Time 3.973    Capacitor Charge Energy 18    Marcos Statistic Date Time Start 20220623111502    Marcos Statistic Date Time End 35953732357929    Marcos Statistic RA Percent Paced 0.01    Marcos Statistic RV Percent Paced 0.04    Marcos Statistic AP  Percent 0    Marcos Statistic AS  Percent 0.03    Marcos Statistic AP VS Percent 0    Marcos Statistic AS VS Percent 99.96    Atrial Tachy Statistic Date Time Start 20220623111502    Atrial Tachy Statistic Date Time End 30705898626144    Atrial Tachy Statistic AT/AF [place] Percent 0    Therapy Statistic Recent Shocks Delivered 0    Therapy Statistic Recent Shocks Aborted 0    Therapy Statistic Recent ATP Delivered 0    Therapy Statistic Recent Date Time Start 71928993971954    Therapy Statistic Recent Date Time End 84993409830410    Therapy Statistic Total Shocks Delivered 1    Therapy Statistic Total Shocks Aborted 0    Therapy Statistic Total ATP Delivered 0    Therapy Statistic Total  Date Time Start 39472569453562    Therapy Statistic Total  Date Time End 48411320537496    Episode Statistic Recent Count 0    Episode Statistic Type Category AT/AF    Episode Statistic Recent Count 0    Episode Statistic Type Category SVT    Episode Statistic Recent Count 0    Episode Statistic Type Category VT    Episode Statistic Recent Count 0    Episode Statistic Type Category VF    Episode Statistic Recent Count 0    Episode Statistic Type Category VT    Episode Statistic Recent Count 0    Episode Statistic Type Category VT    Episode Statistic Recent Count 0    Episode Statistic Type Category VT    Episode Statistic Recent Date Time Start  05023991337383    Episode Statistic Recent Date Time End 17679711188316    Episode Statistic Recent Date Time Start 13376275260876    Episode Statistic Recent Date Time End 14766474680687    Episode Statistic Recent Date Time Start 81576007444434    Episode Statistic Recent Date Time End 51688603713518    Episode Statistic Recent Date Time Start 26436248202626    Episode Statistic Recent Date Time End 28602820104943    Episode Statistic Recent Date Time Start 19289168109002    Episode Statistic Recent Date Time End 27920328343780    Episode Statistic Recent Date Time Start 83653145793509    Episode Statistic Recent Date Time End 61631440944840    Episode Statistic Recent Date Time Start 67470229602619    Episode Statistic Recent Date Time End 44096831823740    Episode Statistic Total Count 2    Episode Statistic Type Category AT/AF    Episode Statistic Total Count 0    Episode Statistic Type Category SVT    Episode Statistic Total Count 9    Episode Statistic Type Category VT    Episode Statistic Total Count 1    Episode Statistic Type Category VF    Episode Statistic Total Count 0    Episode Statistic Type Category VT    Episode Statistic Total Count 0    Episode Statistic Type Category VT    Episode Statistic Total Count 1    Episode Statistic Type Category VT    Episode Statistic Total Date Time Start [id number]    Episode Statistic Total Date Time End 22857869351195    Episode Statistic Total Date Time Start 62283183401344    Episode Statistic Total Date Time End 91591130495424    Episode Statistic Total Date Time Start 92700133359321    Episode Statistic Total Date Time End 21164185200749    Episode Statistic Total Date Time Start 38164877515307    Episode Statistic Total Date Time End 59534372419222    Episode Statistic Total Date Time Start 78639671221525    Episode Statistic Total Date Time End 25947502970225    Episode Statistic Total Date Time Start 47429292877140    Episode Statistic Total Date Time  End 56026396584010    Episode Statistic Total Date Time Start 04478313506718    Episode Statistic Total Date Time End 07844448236752    Narrative    Patient seen in [address] [place] [address] for evaluation and iterative programming of their   ICD per MD orders PRE-OP LVAD implant.    Device: Medtronic [id number] Evera XT   Normal Device Function.  Mode: AAI<=>DDD  bpm  AP: <0.1%  : <0.1%  Intrinsic rhythm: SR @ 80 bpm  Short V-V intervals: 0  Thoracic Impedance: Near reference line.   Lead Trends Appear Stable: Yes  Estimated battery longevity to RRT = 5.2 years. Battery voltage = 2.98 V.  Atrial arrhythmia: Device records 4 AT/AF episodes each lasting < 1 min in   duration for a total time in AT/AF = 14 seconds. No EGMs for further   review.  AF burden: <0.1%  Anticoagulant: Heparin gtt  Ventricular Arrhythmia: None  Setting changes: Tachy therapies disabled for procedure.    Plan: Please page Device RN post-op for reprogramming.  RICKIE Mcpherson RN    Dual lead ICD    I have reviewed and interpreted the device interrogation, settings,   programming and nurse's summary. The device is functioning within normal   device parameters. I agree with the current findings, assessment and plan.   Cardiac Device Check - Inpatient     Value    Date Time Interrogation Session [id number]    Implantable Pulse Generator  Medtronic    Implantable Pulse Generator Model [id number] Eliseo TIRADO DR    Implantable Pulse Generator Serial Number [id number]    Type Interrogation Session In Clinic    Clinic Name [facility] [place]    Implantable Pulse Generator Type Defibrillator    Implantable Pulse Generator Implant Date 20180412    Implantable Lead  Medtronic    Implantable Lead Model 5076 CapSureFix Novus    Implantable Lead Serial Number [id number]    Implantable Lead Implant Date 20060306    Implantable Lead Polarity Type Bipolar Lead    Implantable Lead Location Detail 1 UNKNOWN    Implantable Lead Location  Right Atrium    Implantable Lead  Medtronic    Implantable Lead Model 6949 Fidel Sal    Implantable Lead Serial Number [id number]    Implantable Lead Implant Date 20060306    Implantable Lead Polarity Type Bipolar Lead    Implantable Lead Location Detail 1 UNKNOWN    Implantable Lead Location Right Ventricle    Marcos Setting Mode (NBG Code) MVP_AAI_DDD    Marcos Setting Lower Rate Limit 50    Marcos Setting Maximum Tracking Rate 130    Marcos Setting Maximum Sensor Rate 115    Marcos Setting Hysterisis Rate DISABLED    Marcos Setting TRISTEN Delay Low 350    Marcos Setting PAV Delay Low 350    Marcos Setting AT Mode Switch Rate 171    Lead Channel Setting Sensing Polarity Bipolar    Lead Channel Setting Sensing Anode Location Right Atrium    Lead Channel Setting Sensing Anode Terminal Ring    Lead Channel Setting Sensing Cathode Location Right Atrium    Lead Channel Setting Sensing Cathode Terminal Tip    Lead Channel Setting Sensing Sensitivity 0.3    Lead Channel Setting Sensing Polarity Bipolar    Lead Channel Setting Sensing Anode Location Right Ventricle    Lead Channel Setting Sensing Anode Terminal Ring    Lead Channel Setting Sensing Cathode Location Right Ventricle    Lead Channel Setting Sensing Cathode Terminal Tip    Lead Channel Setting Sensing Sensitivity 0.3    Lead Channel Setting Pacing Polarity Bipolar    Lead Channel Setting Pacing Anode Location Right Atrium    Lead Channel Setting Pacing Anode Terminal Ring    Lead Channel Setting Sensing Cathode Location Right Atrium    Lead Channel Setting Sensing Cathode Terminal Tip    Lead Channel Setting Pacing Pulse Width 0.4    Lead Channel Setting Pacing Amplitude 2    Lead Channel Setting Pacing Capture Mode Adaptive    Lead Channel Setting Pacing Polarity Bipolar    Lead Channel Setting Pacing Anode Location Right Ventricle    Lead Channel Setting Pacing Anode Terminal Ring    Lead Channel Setting Sensing Cathode Location Right Ventricle    Lead  Channel Setting Sensing Cathode Terminal Tip    Lead Channel Setting Pacing Pulse Width 1    Lead Channel Setting Pacing Amplitude 5    Lead Channel Setting Pacing Capture Mode Adaptive    Zone Setting Type Category VF    Zone Setting Detection Interval 320    Zone Setting Detection Beats Numerator 30    Zone Setting Detection Beats Denominator 40    Zone Setting Type Category VT    Zone Setting Detection Interval 280    Zone Setting Type Category VT    Zone Setting Detection Interval 350    Zone Setting Type Category VT    Zone Setting Detection Interval 400    Zone Setting Type Category ATRIAL_FIBRILLATION    Zone Setting Type Category AT/AF    Zone Setting Detection Interval 350    Lead Channel Impedance Value 285    Lead Channel Sensing Intrinsic Amplitude 0.4    Lead Channel Pacing Threshold Amplitude 0.75    Lead Channel Pacing Threshold Pulse Width 0.4    Lead Channel Impedance Value 722    Lead Channel Impedance Value 646    Lead Channel Sensing Intrinsic Amplitude 1    Lead Channel Pacing Threshold Amplitude 3.00    Lead Channel Pacing Threshold Pulse Width 1.0    Battery Date Time of Measurements [id number]    Battery Status OK    Battery RRT Trigger 2.727    Battery Remaining Longevity 63    Battery Voltage 2.91    Capacitor Charge Type Reformation    Capacitor Last Charge Date Time [id number]    Capacitor Charge Time 3.973    Capacitor Charge Energy 18    Marcos Statistic Date Time Start 34714600483500    Marcos Statistic Date Time End 67388690055184    Marcos Statistic RA Percent Paced 18.52    Marcos Statistic RV Percent Paced 18.6    Marcos Statistic AP  Percent 18.49    Marcos Statistic AS  Percent 0.19    Marcos Statistic AP VS Percent 0.05    Marcos Statistic AS VS Percent 81.27    Atrial Tachy Statistic Date Time Start 72305806162968    Atrial Tachy Statistic Date Time End 40915159585557    Atrial Tachy Statistic AT/AF [place] Percent 0    Therapy Statistic Recent Shocks Delivered 0    Therapy  Statistic Recent Shocks Aborted 0    Therapy Statistic Recent ATP Delivered 0    Therapy Statistic Recent Date Time Start 56025460071162    Therapy Statistic Recent Date Time End 18087320002636    Therapy Statistic Total Shocks Delivered 1    Therapy Statistic Total Shocks Aborted 0    Therapy Statistic Total ATP Delivered 0    Therapy Statistic Total  Date Time Start 30896776224983    Therapy Statistic Total  Date Time End 15013230491575    Episode Statistic Recent Count 0    Episode Statistic Type Category AT/AF    Episode Statistic Recent Count 0    Episode Statistic Type Category SVT    Episode Statistic Recent Count 0    Episode Statistic Type Category VT    Episode Statistic Recent Count 0    Episode Statistic Type Category VF    Episode Statistic Recent Count 0    Episode Statistic Type Category VT    Episode Statistic Recent Count 0    Episode Statistic Type Category VT    Episode Statistic Recent Count 0    Episode Statistic Type Category VT    Episode Statistic Recent Date Time Start 32446018878209    Episode Statistic Recent Date Time End 58946813581801    Episode Statistic Recent Date Time Start 83784688118133    Episode Statistic Recent Date Time End 74939882668189    Episode Statistic Recent Date Time Start 74131309833525    Episode Statistic Recent Date Time End 98351868160879    Episode Statistic Recent Date Time Start 11290483834011    Episode Statistic Recent Date Time End 40658029721816    Episode Statistic Recent Date Time Start 80765255278584    Episode Statistic Recent Date Time End 90021987137582    Episode Statistic Recent Date Time Start 78259443482312    Episode Statistic Recent Date Time End 55229057560294    Episode Statistic Recent Date Time Start 56678199959133    Episode Statistic Recent Date Time End 97382632954698    Episode Statistic Total Count 33    Episode Statistic Type Category AT/AF    Episode Statistic Total Count 0    Episode Statistic Type Category SVT    Episode Statistic  Total Count 9    Episode Statistic Type Category VT    Episode Statistic Total Count 1    Episode Statistic Type Category VF    Episode Statistic Total Count 0    Episode Statistic Type Category VT    Episode Statistic Total Count 0    Episode Statistic Type Category VT    Episode Statistic Total Count 1    Episode Statistic Type Category VT    Episode Statistic Total Date Time Start 39332442939773    Episode Statistic Total Date Time End 49435834514052    Episode Statistic Total Date Time Start 22269940507364    Episode Statistic Total Date Time End 60078302268443    Episode Statistic Total Date Time Start 20180412151624    Episode Statistic Total Date Time End 53616714436108    Episode Statistic Total Date Time Start 37339668192297    Episode Statistic Total Date Time End 42187301665735    Episode Statistic Total Date Time Start 20180412151624    Episode Statistic Total Date Time End 21103359651307    Episode Statistic Total Date Time Start 20180412151624    Episode Statistic Total Date Time End 25896466086491    Episode Statistic Total Date Time Start 87853614888735    Episode Statistic Total Date Time End 86764586224018    Narrative    Patient seen in [address] [place] [address] for evaluation and iterative programming of their   ICD per MD orders to verify ICD settings s/p chest wash out last evening.    Device: [employer] [id number] Evera XT DR  Normal Device Function.  Mode: DDDR /130 --> AAI<=>DDD /130 bpm  AP: 18.5%  : 18.6%  Intrinsic rhythm: ST @ 105 bpm  Short V-V intervals: 0  Thoracic Impedance: Below reference line, suggesting possible fluid   accumulation.  Lead Trends Appear Stable: RV impedance (both bipolar and tip to coil)   continues to trend up. RV capture threshold remains elevated, today   measures 3.0 V @ 1.0 ms. R-wave amplitude remains small, measuring at 1.0   mV today. CVTS aware.  Estimated battery longevity to RRT = 5.2 years. Battery voltage = 2.91 V.  Atrial arrhythmia: Device records 1 AT/AF  episode < 1 min in duration for   total time in AT/AF = 3 seconds. No EGM available for further review.  AF burden: <0.1%  Anticoagulant: Heparin gtt currently held per provider orders  Ventricular Arrhythmia: N/R due to VT Monitor turned off.  Setting changes: Mode changed from DDDR to AAI<=>DDD. VT detection zone   (171-188 bpm) turned OFF. VT Monitor zone (150 bpm) turned ON. These   changes were made to restore ICD settings to baseline programming after   post-op programming yesterday evening by Cardiology Fellow. VF detection   zone (>188 bpm) and FVT detection zone (via VF) remain programmed ON, per   previous settings.    Plan: Continue inpatient evaluation and treatment.  RICKIE Mcpherson RN    Dual lead ICD    I have reviewed and interpreted the device interrogation, settings,   programming and nurse's summary. The device is functioning within normal   device parameters. I agree with the current findings, assessment and plan.   Cardiac Device Check - Inpatient     Value    Date Time Interrogation Session [id number]    Implantable Pulse Generator  Medtronic    Implantable Pulse Generator Model [id number] Eliseo TIRADO DR    Implantable Pulse Generator Serial Number [id number]    Type Interrogation Session In Clinic    Clinic Name [facility]    Implantable Pulse Generator Type Defibrillator    Implantable Pulse Generator Implant Date 20180412    Implantable Lead  Medtronic    Implantable Lead Model 5076 CapSureFix Novus    Implantable Lead Serial Number [id number]    Implantable Lead Implant Date 20060306    Implantable Lead Polarity Type Bipolar Lead    Implantable Lead Location Detail 1 UNKNOWN    Implantable Lead Location Right Atrium    Implantable Lead  Medtronic    Implantable Lead Model 6949 Sprint [place]    Implantable Lead Serial Number [id number]    Implantable Lead Implant Date 20060306    Implantable Lead Polarity Type Bipolar Lead     Implantable Lead Location Detail 1 UNKNOWN    Implantable Lead Location Right Ventricle    Marcos Setting Mode (NBG Code) MVP_AAI_DDD    Marcos Setting Lower Rate Limit 50    Marcos Setting Maximum Tracking Rate 130    Marcos Setting Maximum Sensor Rate 115    Marcos Setting Hysterisis Rate DISABLED    Marcos Setting TRISTEN Delay Low 350    Marcos Setting PAV Delay Low 350    Marcos Setting AT Mode Switch Rate 171    Lead Channel Setting Sensing Polarity Bipolar    Lead Channel Setting Sensing Anode Location Right Atrium    Lead Channel Setting Sensing Anode Terminal Ring    Lead Channel Setting Sensing Cathode Location Right Atrium    Lead Channel Setting Sensing Cathode Terminal Tip    Lead Channel Setting Sensing Sensitivity 0.3    Lead Channel Setting Sensing Polarity Bipolar    Lead Channel Setting Sensing Anode Location Right Ventricle    Lead Channel Setting Sensing Anode Terminal Ring    Lead Channel Setting Sensing Cathode Location Right Ventricle    Lead Channel Setting Sensing Cathode Terminal Tip    Lead Channel Setting Sensing Sensitivity 0.3    Lead Channel Setting Pacing Polarity Bipolar    Lead Channel Setting Pacing Anode Location Right Atrium    Lead Channel Setting Pacing Anode Terminal Ring    Lead Channel Setting Sensing Cathode Location Right Atrium    Lead Channel Setting Sensing Cathode Terminal Tip    Lead Channel Setting Pacing Pulse Width 0.4    Lead Channel Setting Pacing Amplitude 2    Lead Channel Setting Pacing Capture Mode Adaptive    Lead Channel Setting Pacing Polarity Bipolar    Lead Channel Setting Pacing Anode Location Right Ventricle    Lead Channel Setting Pacing Anode Terminal Ring    Lead Channel Setting Sensing Cathode Location Right Ventricle    Lead Channel Setting Sensing Cathode Terminal Tip    Lead Channel Setting Pacing Pulse Width 1    Lead Channel Setting Pacing Amplitude 5    Lead Channel Setting Pacing Capture Mode Adaptive    Zone Setting Type Category VF    Zone Setting  Detection Interval 320    Zone Setting Detection Beats Numerator 30    Zone Setting Detection Beats Denominator 40    Zone Setting Type Category VT    Zone Setting Detection Interval 280    Zone Setting Type Category VT    Zone Setting Detection Interval 350    Zone Setting Type Category VT    Zone Setting Detection Interval 400    Zone Setting Type Category ATRIAL_FIBRILLATION    Zone Setting Type Category AT/AF    Zone Setting Detection Interval 350    Lead Channel Impedance Value 304    Lead Channel Sensing Intrinsic Amplitude 0.6    Lead Channel Pacing Threshold Amplitude 0.5    Lead Channel Pacing Threshold Pulse Width 0.4    Lead Channel Impedance Value 475    Lead Channel Impedance Value 418    Lead Channel Sensing Intrinsic Amplitude 0.6    Lead Channel Pacing Threshold Amplitude 4    Lead Channel Pacing Threshold Pulse Width 1    Battery Date Time of Measurements 20220715101154    Battery Status OK    Battery RRT Trigger 2.727    Battery Remaining Longevity 59    Battery Voltage 2.93    Capacitor Charge Type Reformation    Capacitor Last Charge Date Time 83472988034092    Capacitor Charge Time 3.973    Capacitor Charge Energy 18    Marcos Statistic Date Time Start 20220713074648    Marcos Statistic Date Time End 20220715100938    Marcos Statistic RA Percent Paced 0    Marcos Statistic RV Percent Paced 0    Marcos Statistic AP  Percent 0    Marcos Statistic AS  Percent 0    Marcos Statistic AP VS Percent 0    Marcos Statistic AS VS Percent 100    Atrial Tachy Statistic Date Time Start 67765561411002    Atrial Tachy Statistic Date Time End 20220715100938    Atrial Tachy Statistic AT/AF [place] Percent 0    Therapy Statistic Recent Shocks Delivered 0    Therapy Statistic Recent Shocks Aborted 0    Therapy Statistic Recent ATP Delivered 0    Therapy Statistic Recent Date Time Start 20220713074648    Therapy Statistic Recent Date Time End 20220715100938    Therapy Statistic Total Shocks Delivered 1    Therapy  Statistic Total Shocks Aborted 0    Therapy Statistic Total ATP Delivered 0    Therapy Statistic Total  Date Time Start 38370911265853    Therapy Statistic Total  Date Time End 28372509765841    Episode Statistic Recent Count 0    Episode Statistic Type Category AT/AF    Episode Statistic Recent Count 0    Episode Statistic Type Category SVT    Episode Statistic Recent Count 0    Episode Statistic Type Category VT    Episode Statistic Recent Count 0    Episode Statistic Type Category VF    Episode Statistic Recent Count 0    Episode Statistic Type Category VT    Episode Statistic Recent Count 0    Episode Statistic Type Category VT    Episode Statistic Recent Count 0    Episode Statistic Type Category VT    Episode Statistic Recent Date Time Start 33302328674938    Episode Statistic Recent Date Time End 88172873215143    Episode Statistic Recent Date Time Start 62741796145895    Episode Statistic Recent Date Time End 33842828713785    Episode Statistic Recent Date Time Start 52498973794315    Episode Statistic Recent Date Time End 06929895102388    Episode Statistic Recent Date Time Start 46132815071605    Episode Statistic Recent Date Time End 71191664672748    Episode Statistic Recent Date Time Start 91908928174403    Episode Statistic Recent Date Time End 64565670372919    Episode Statistic Recent Date Time Start 12350797269976    Episode Statistic Recent Date Time End 98502472983761    Episode Statistic Recent Date Time Start 22494616174927    Episode Statistic Recent Date Time End 26097032983455    Episode Statistic Total Count 33    Episode Statistic Type Category AT/AF    Episode Statistic Total Count 0    Episode Statistic Type Category SVT    Episode Statistic Total Count 9    Episode Statistic Type Category VT    Episode Statistic Total Count 1    Episode Statistic Type Category VF    Episode Statistic Total Count 0    Episode Statistic Type Category VT    Episode Statistic Total Count 0    Episode  Statistic Type Category VT    Episode Statistic Total Count 1    Episode Statistic Type Category VT    Episode Statistic Total Date Time Start 20180412151624    Episode Statistic Total Date Time End 20220715100938    Episode Statistic Total Date Time Start 20180412151624    Episode Statistic Total Date Time End 20220715100938    Episode Statistic Total Date Time Start 20180412151624    Episode Statistic Total Date Time End 20220715100938    Episode Statistic Total Date Time Start 20180412151624    Episode Statistic Total Date Time End 20220715100938    Episode Statistic Total Date Time Start 20180412151624    Episode Statistic Total Date Time End 20220715100938    Episode Statistic Total Date Time Start 20180412151624    Episode Statistic Total Date Time End 20220715100938    Episode Statistic Total Date Time Start 20180412151624    Episode Statistic Total Date Time End 20220715100938    Narrative    Patient seen in [address] [place] [address] for evaluation and iterative programming of their ICD per MD orders for arrhythmia assessment.    Device: [employer] [id number] Evera XT DR  Normal Device Function.  Mode: AAI<=>DDD /130 bpm  AP: <0.1%  : 0%  Intrinsic rhythm: ST @ 109 bpm  Short V-V intervals: 0  Thoracic Impedance: Below reference line, suggesting possible fluid accumulation.  Lead Trends Appear Stable: RV lead impedance stable. R-wave amplitude remains small, measuring at 0.6 mV today. RV capture threshold continues to climb, measuring at 4.00 V @ 1.0 ms today. Cardiology team at bedside during interrogation and notified of these findings.  Estimated battery longevity to RRT = 4.9 years. Battery voltage = 2.94 V.  Atrial arrhythmia: None  AF burden: 0%  Anticoagulant: Heparin gtt  Ventricular Arrhythmia: None  Setting changes: None    Plan: Continue inpatient evaluation and treatment.  RICKIE Mcpherson RN    Dual lead ICD    I have reviewed and interpreted the device interrogation, settings, programming and nurse's summary.  The device is functioning within normal device parameters. I agree with the current findings, assessment and plan.   Cardiac Device Check - Inpatient     Value    Date Time Interrogation Session [id number]    Implantable Pulse Generator  Medtronic    Implantable Pulse Generator Model [id number] Evera XT DR    Implantable Pulse Generator Serial Number [id number]    Type Interrogation Session In Clinic    Clinic Name [facility]    Implantable Pulse Generator Type Defibrillator    Implantable Pulse Generator Implant Date 20180412    Implantable Lead  Medtronic    Implantable Lead Model 5076 CapSureFix Novus    Implantable Lead Serial Number [id number]    Implantable Lead Implant Date 20060306    Implantable Lead Polarity Type Bipolar Lead    Implantable Lead Location Detail 1 UNKNOWN    Implantable Lead Location Right Atrium    Implantable Lead  Medtronic    Implantable Lead Model 6949 Sprint [place]    Implantable Lead Serial Number [id number]    Implantable Lead Implant Date 20060306    Implantable Lead Polarity Type Bipolar Lead    Implantable Lead Location Detail 1 UNKNOWN    Implantable Lead Location Right Ventricle    Marcos Setting Mode (NBG Code) MVP_AAI_DDD    Marcos Setting Lower Rate Limit 50    Marcos Setting Maximum Tracking Rate 130    Marcos Setting Maximum Sensor Rate 115    Marcos Setting Hysterisis Rate DISABLED    Marcos Setting TRISTEN Delay Low 350    Marcos Setting PAV Delay Low 350    Marcos Setting AT Mode Switch Rate 171    Lead Channel Setting Sensing Polarity Bipolar    Lead Channel Setting Sensing Anode Location Right Atrium    Lead Channel Setting Sensing Anode Terminal Ring    Lead Channel Setting Sensing Cathode Location Right Atrium    Lead Channel Setting Sensing Cathode Terminal Tip    Lead Channel Setting Sensing Sensitivity 0.15    Lead Channel Setting Sensing Polarity Bipolar    Lead Channel Setting Sensing Anode Location Right  Ventricle    Lead Channel Setting Sensing Anode Terminal Ring    Lead Channel Setting Sensing Cathode Location Right Ventricle    Lead Channel Setting Sensing Cathode Terminal Tip    Lead Channel Setting Sensing Sensitivity 0.3    Lead Channel Setting Pacing Polarity Bipolar    Lead Channel Setting Pacing Anode Location Right Atrium    Lead Channel Setting Pacing Anode Terminal Ring    Lead Channel Setting Sensing Cathode Location Right Atrium    Lead Channel Setting Sensing Cathode Terminal Tip    Lead Channel Setting Pacing Pulse Width 0.4    Lead Channel Setting Pacing Amplitude 1.5    Lead Channel Setting Pacing Capture Mode Adaptive    Lead Channel Setting Pacing Polarity Bipolar    Lead Channel Setting Pacing Anode Location Right Ventricle    Lead Channel Setting Pacing Anode Terminal Ring    Lead Channel Setting Sensing Cathode Location Right Ventricle    Lead Channel Setting Sensing Cathode Terminal Tip    Lead Channel Setting Pacing Pulse Width 1    Lead Channel Setting Pacing Amplitude 5    Lead Channel Setting Pacing Capture Mode Adaptive    Zone Setting Type Category VF    Zone Setting Detection Interval 320    Zone Setting Detection Beats Numerator 30    Zone Setting Detection Beats Denominator 40    Zone Setting Type Category VT    Zone Setting Detection Interval 280    Zone Setting Type Category VT    Zone Setting Detection Interval 350    Zone Setting Type Category VT    Zone Setting Detection Interval 400    Zone Setting Type Category ATRIAL_FIBRILLATION    Zone Setting Type Category AT/AF    Zone Setting Detection Interval 350    Lead Channel Impedance Value 342    Lead Channel Sensing Intrinsic Amplitude 1    Lead Channel Pacing Threshold Amplitude 0.5    Lead Channel Pacing Threshold Pulse Width 0.4    Lead Channel Impedance Value 513    Lead Channel Impedance Value 418    Lead Channel Sensing Intrinsic Amplitude 0.9    Lead Channel Pacing Threshold Amplitude 3.25    Lead Channel Pacing Threshold  Pulse Width 1    Battery Date Time of Measurements 20220728144216    Battery Status OK    Battery RRT Trigger 2.727    Battery Remaining Longevity 52    Battery Voltage 2.96    Capacitor Charge Type Reformation    Capacitor Last Charge Date Time 20220528111827    Capacitor Charge Time 3.973    Capacitor Charge Energy 18    Marcos Statistic Date Time Start 20220715101013    Marcos Statistic Date Time End 20220728143957    Marcos Statistic RA Percent Paced 0.03    Marcos Statistic RV Percent Paced 0.08    Marcos Statistic AP  Percent 0.02    Marcos Statistic AS  Percent 0.06    Marcos Statistic AP VS Percent 0.02    Marcos Statistic AS VS Percent 99.9    Atrial Tachy Statistic Date Time Start 20220715101013    Atrial Tachy Statistic Date Time End 20220728143957    Atrial Tachy Statistic AT/AF [place] Percent 4.9    Therapy Statistic Recent Shocks Delivered 0    Therapy Statistic Recent Shocks Aborted 0    Therapy Statistic Recent ATP Delivered 0    Therapy Statistic Recent Date Time Start 20220715101013    Therapy Statistic Recent Date Time End 14503746815689    Therapy Statistic Total Shocks Delivered 1    Therapy Statistic Total Shocks Aborted 0    Therapy Statistic Total ATP Delivered 0    Therapy Statistic Total  Date Time Start 99192815324533    Therapy Statistic Total  Date Time End [id number]    Episode Statistic Recent Count 1    Episode Statistic Type Category AT/AF    Episode Statistic Recent Count 0    Episode Statistic Type Category SVT    Episode Statistic Recent Count 0    Episode Statistic Type Category VT    Episode Statistic Recent Count 0    Episode Statistic Type Category VF    Episode Statistic Recent Count 0    Episode Statistic Type Category VT    Episode Statistic Recent Count 0    Episode Statistic Type Category VT    Episode Statistic Recent Count 0    Episode Statistic Type Category VT    Episode Statistic Recent Date Time Start 20220715101013    Episode Statistic Recent Date Time End  [id number]    Episode Statistic Recent Date Time Start 61537856082012    Episode Statistic Recent Date Time End 92463488048219    Episode Statistic Recent Date Time Start 11757297913057    Episode Statistic Recent Date Time End 77723126731821    Episode Statistic Recent Date Time Start 49067537312601    Episode Statistic Recent Date Time End 56127959008688    Episode Statistic Recent Date Time Start 81472631487314    Episode Statistic Recent Date Time End 64953560557683    Episode Statistic Recent Date Time Start 49203606004006    Episode Statistic Recent Date Time End 53935560294964    Episode Statistic Recent Date Time Start 54049602118116    Episode Statistic Recent Date Time End 71867973404446    Episode Statistic Total Count 34    Episode Statistic Type Category AT/AF    Episode Statistic Total Count 0    Episode Statistic Type Category SVT    Episode Statistic Total Count 9    Episode Statistic Type Category VT    Episode Statistic Total Count 1    Episode Statistic Type Category VF    Episode Statistic Total Count 0    Episode Statistic Type Category VT    Episode Statistic Total Count 0    Episode Statistic Type Category VT    Episode Statistic Total Count 1    Episode Statistic Type Category VT    Episode Statistic Total Date Time Start 20902102290851    Episode Statistic Total Date Time End 68863715601496    Episode Statistic Total Date Time Start 79144874993444    Episode Statistic Total Date Time End 46044217215467    Episode Statistic Total Date Time Start 32954238710849    Episode Statistic Total Date Time End 27304759813054    Episode Statistic Total Date Time Start 90826341479472    Episode Statistic Total Date Time End 35885864207600    Episode Statistic Total Date Time Start 30216544535360    Episode Statistic Total Date Time End 97042336812999    Episode Statistic Total Date Time Start 93926089706512    Episode Statistic Total Date Time End 96001069310249    Episode Statistic Total Date Time  Start 25272283942855    Episode Statistic Total Date Time End 61666489771285    Episode Identifier 57    Episode Type Category Monitor    Episode Date Time [id number]    Episode Duration 55,297    Narrative    Patient seen in [address] [place] [address] for evaluation and iterative programming of their   ICD per MD orders.    Device: Medtronic [id number] Evera XT DR  Normal Device Function.   Mode: AAI<=>DDD  bpm  AP: <0.1%  : <0.1%  Intrinsic rhythm: ST @ 115 bpm  Short V-V intervals: 4  Thoracic Impedance: Below reference line, suggesting possible fluid   accumulation.  Lead Trends Appear Stable: Yes. RV impedance measures 513 ohms, R-waves   measure small at 0.9 mV, and RV capture threshold measures high at 3.25 V   @ 1.00 ms. This has remained consistent over the last several weeks.  Estimated battery longevity to RRT = 4.3 years. Battery voltage = 2.96 V.  Atrial arrhythmia: 1 AT/AF episode recorded, lasting 15 hours 22 minutes   with ventricular rate of 107 bpm. This episode was recorded on 7/19/22.  AF burden: 4.9%  Anticoagulant: Warfarin  Ventricular Arrhythmia: None  Setting changes: Atrial sensitivity changed from 0.3 mV to 0.15 mV.   Patient's device has been alarming in the morning due to unsuccessful   [facility] transmission. Patient is due for a remote transmission with his   outpatient device clinic, but has not been home / near his bedside   monitor. This is normal function.     Plan: Continue inpatient evaluation and treatment.  RICKIE Mcpherson RN    Dual lead ICD    I have reviewed and interpreted the device interrogation, settings,   programming and nurse's summary. The device is functioning within normal   device parameters. I agree with the current findings, assessment and plan.     *Note: Due to a large number of results and/or encounters for the requested time period, some results have not been displayed. A complete set of results can be found in Results Review.       Discharge Medications   Discharge  Medication List as of 8/21/2022 10:55 AM      START taking these medications    Details   acetaminophen (TYLENOL) 325 MG tablet 3 tablets (975 mg) by Oral or Feeding Tube route every 8 hours as needed for mild pain (Use first for pain), R-0, No Print Out      aspirin (ASA) 81 MG chewable tablet 1 tablet (81 mg) by Oral or Feeding Tube route daily, R-0, No Print Out      digoxin (LANOXIN) 125 MCG tablet Take 1 tablet (125 mcg) by mouth daily, R-0, No Print Out      hydrALAZINE (APRESOLINE) 25 MG tablet Take 1.5 tablets (37.5 mg) by mouth every 8 hours, R-0, No Print Out      Lidocaine (LIDOCARE) 4 % Patch Place 1 patch onto the skin every 24 hours To prevent lidocaine toxicity, patient should be patch free for 12 hrs daily.R-0No Print Out      multivitamin w/minerals (THERA-VIT-M) tablet Take 1 tablet by mouth daily, R-0, No Print Out      pantoprazole (PROTONIX) 40 MG EC tablet Take 1 tablet (40 mg) by mouth every morning (before breakfast), R-0, No Print Out      !! QUEtiapine (SEROQUEL) 25 MG tablet Take 1 tablet (25 mg) by mouth 2 times daily, R-0, No Print Out      !! QUEtiapine (SEROQUEL) 50 MG tablet 3 tablets (150 mg) by Oral or Feeding Tube route At Bedtime, R-0, No Print Out      !! QUEtiapine (SEROQUEL) 50 MG tablet Take 1 tablet (50 mg) by mouth nightly as needed (in addition to scheduled qhs dose PRN), R-0, No Print Out      saline nasal (AYR SALINE) GEL topical gel Apply into each nare At BedtimeR-0No Print Out      sertraline (ZOLOFT) 100 MG tablet Take 1 tablet (100 mg) by mouth daily, R-0, No Print Out      sodium chloride (OCEAN) 0.65 % nasal spray Spray 2 sprays into both nostrils every 4 hours, R-0, No Print Out       !! - Potential duplicate medications found. Please discuss with provider.      CONTINUE these medications which have CHANGED    Details   lisinopril (ZESTRIL) 2.5 MG tablet Take 1 tablet (2.5 mg) by mouth daily, R-0, No Print Out         CONTINUE these medications which have NOT  CHANGED    Details   atorvastatin (LIPITOR) 40 MG tablet Take 40 mg by mouth daily, Historical      ferrous sulfate (FE TABS) 325 (65 Fe) MG EC tablet Take 325 mg by mouth daily (with lunch), Historical      hydroxychloroquine (PLAQUENIL) 200 MG tablet Take 200 mg by mouth 2 times daily, Historical      hydrOXYzine (ATARAX) 25 MG tablet Take 1 tablet (25 mg) by mouth every 6 hours as needed for anxiety (ANXIETY), Disp-60 tablet, R-3, E-Prescribe      LORazepam (ATIVAN) 0.5 MG tablet Take 0.5-1 mg by mouth every 4 hours as needed for anxiety, Historical      melatonin 3 MG tablet Take 6 mg by mouth nightly as needed for sleep, Historical      mycophenolate (GENERIC EQUIVALENT) 500 MG tablet Take 1,500 mg by mouth 2 times daily, Historical      promethazine (PHENERGAN) 12.5 MG tablet Take 1 tablet (12.5 mg) by mouth every 6 hours as needed for nausea or vomiting, Disp-30 tablet, R-1, E-Prescribe      tamsulosin (FLOMAX) 0.4 MG capsule Take 0.4 mg by mouth daily, Historical      thiamine (B-1) 100 MG tablet Take 1 tablet (100 mg) by mouth daily, Disp-30 tablet, R-0, E-Prescribe      warfarin ANTICOAGULANT (COUMADIN) 5 MG tablet Take 1 tablet (5 mg) by mouth daily Get an INR on Friday 5/27 and then follow instructions by your coumadin clinic, Disp-30 tablet, R-3, E-Prescribe      zolpidem (AMBIEN) 5 MG tablet Take 5 mg by mouth nightly as needed for sleep, Historical         STOP taking these medications       clopidogrel (PLAVIX) 75 MG tablet Comments:   Reason for Stopping:         DULoxetine (CYMBALTA) 30 MG capsule Comments:   Reason for Stopping:         famotidine (PEPCID) 20 MG tablet Comments:   Reason for Stopping:         furosemide (LASIX) 40 MG tablet Comments:   Reason for Stopping:         potassium chloride ER (KLOR-CON M) 20 MEQ CR tablet Comments:   Reason for Stopping:             Allergies   No Known Allergies

## 2022-08-21 NOTE — PLAN OF CARE
Neuro: A/Ox4. Appeared to sleep well.   Cardiac: SR 1st degree AVB, BBB with HR 90's. VSS. No LVAD alarms.    Respiratory: O2 sats stable on RA  GI/: Voiding small amounts in urinal  Diet/appetite: Fair po, 3gm NA  Activity:  Up with walker, GB assist 1  Pain: Oxycodone x 1 for back pain with relief, scheduled Tylenol  Skin: Driveline intact, sternal inc and old CT sites CDI  LDA's: Driveline, PICC    Plan: May go to rehab today if bed available. Continue with POC. Notify primary team with changes.

## 2022-08-22 ENCOUNTER — APPOINTMENT (OUTPATIENT)
Dept: OCCUPATIONAL THERAPY | Facility: CLINIC | Age: 62
DRG: 949 | End: 2022-08-22
Attending: PHYSICAL MEDICINE & REHABILITATION
Payer: COMMERCIAL

## 2022-08-22 ENCOUNTER — DOCUMENTATION ONLY (OUTPATIENT)
Dept: ANTICOAGULATION | Facility: CLINIC | Age: 62
End: 2022-08-22

## 2022-08-22 ENCOUNTER — APPOINTMENT (OUTPATIENT)
Dept: CT IMAGING | Facility: CLINIC | Age: 62
DRG: 949 | End: 2022-08-22
Attending: INTERNAL MEDICINE
Payer: COMMERCIAL

## 2022-08-22 ENCOUNTER — APPOINTMENT (OUTPATIENT)
Dept: PHYSICAL THERAPY | Facility: CLINIC | Age: 62
DRG: 949 | End: 2022-08-22
Attending: PHYSICAL MEDICINE & REHABILITATION
Payer: COMMERCIAL

## 2022-08-22 DIAGNOSIS — Z95.811 LVAD (LEFT VENTRICULAR ASSIST DEVICE) PRESENT (H): ICD-10-CM

## 2022-08-22 DIAGNOSIS — I50.22 CHRONIC SYSTOLIC CONGESTIVE HEART FAILURE (H): Primary | ICD-10-CM

## 2022-08-22 DIAGNOSIS — I50.20 HEART FAILURE WITH REDUCED EJECTION FRACTION, NYHA CLASS III (H): ICD-10-CM

## 2022-08-22 LAB
ANION GAP SERPL CALCULATED.3IONS-SCNC: 3 MMOL/L (ref 3–14)
BASOPHILS # BLD AUTO: 0.1 10E3/UL (ref 0–0.2)
BASOPHILS NFR BLD AUTO: 1 %
BUN SERPL-MCNC: 10 MG/DL (ref 7–30)
CALCIUM SERPL-MCNC: 9.5 MG/DL (ref 8.5–10.1)
CHLORIDE BLD-SCNC: 108 MMOL/L (ref 94–109)
CO2 SERPL-SCNC: 26 MMOL/L (ref 20–32)
CREAT SERPL-MCNC: 0.58 MG/DL (ref 0.66–1.25)
EOSINOPHIL # BLD AUTO: 0.2 10E3/UL (ref 0–0.7)
EOSINOPHIL NFR BLD AUTO: 3 %
ERYTHROCYTE [DISTWIDTH] IN BLOOD BY AUTOMATED COUNT: 17 % (ref 10–15)
GFR SERPL CREATININE-BSD FRML MDRD: >90 ML/MIN/1.73M2
GLUCOSE BLD-MCNC: 97 MG/DL (ref 70–99)
HCT VFR BLD AUTO: 32.5 % (ref 40–53)
HGB BLD-MCNC: 10.1 G/DL (ref 13.3–17.7)
IMM GRANULOCYTES # BLD: 0 10E3/UL
IMM GRANULOCYTES NFR BLD: 0 %
INR PPP: 2.47 (ref 0.85–1.15)
LYMPHOCYTES # BLD AUTO: 1.1 10E3/UL (ref 0.8–5.3)
LYMPHOCYTES NFR BLD AUTO: 16 %
MCH RBC QN AUTO: 29.1 PG (ref 26.5–33)
MCHC RBC AUTO-ENTMCNC: 31.1 G/DL (ref 31.5–36.5)
MCV RBC AUTO: 94 FL (ref 78–100)
MONOCYTES # BLD AUTO: 0.6 10E3/UL (ref 0–1.3)
MONOCYTES NFR BLD AUTO: 9 %
NEUTROPHILS # BLD AUTO: 5 10E3/UL (ref 1.6–8.3)
NEUTROPHILS NFR BLD AUTO: 71 %
NRBC # BLD AUTO: 0 10E3/UL
NRBC BLD AUTO-RTO: 0 /100
PLATELET # BLD AUTO: 272 10E3/UL (ref 150–450)
POTASSIUM BLD-SCNC: 4.1 MMOL/L (ref 3.4–5.3)
RBC # BLD AUTO: 3.47 10E6/UL (ref 4.4–5.9)
SODIUM SERPL-SCNC: 137 MMOL/L (ref 133–144)
WBC # BLD AUTO: 6.9 10E3/UL (ref 4–11)

## 2022-08-22 PROCEDURE — 70450 CT HEAD/BRAIN W/O DYE: CPT | Mod: 26 | Performed by: RADIOLOGY

## 2022-08-22 PROCEDURE — 85018 HEMOGLOBIN: CPT

## 2022-08-22 PROCEDURE — 250N000013 HC RX MED GY IP 250 OP 250 PS 637: Performed by: INTERNAL MEDICINE

## 2022-08-22 PROCEDURE — 97530 THERAPEUTIC ACTIVITIES: CPT | Mod: GO | Performed by: STUDENT IN AN ORGANIZED HEALTH CARE EDUCATION/TRAINING PROGRAM

## 2022-08-22 PROCEDURE — 99233 SBSQ HOSP IP/OBS HIGH 50: CPT | Performed by: INTERNAL MEDICINE

## 2022-08-22 PROCEDURE — 250N000013 HC RX MED GY IP 250 OP 250 PS 637

## 2022-08-22 PROCEDURE — 36592 COLLECT BLOOD FROM PICC: CPT

## 2022-08-22 PROCEDURE — 85610 PROTHROMBIN TIME: CPT

## 2022-08-22 PROCEDURE — 97166 OT EVAL MOD COMPLEX 45 MIN: CPT | Mod: GO | Performed by: STUDENT IN AN ORGANIZED HEALTH CARE EDUCATION/TRAINING PROGRAM

## 2022-08-22 PROCEDURE — 250N000012 HC RX MED GY IP 250 OP 636 PS 637

## 2022-08-22 PROCEDURE — 250N000013 HC RX MED GY IP 250 OP 250 PS 637: Performed by: PHYSICAL MEDICINE & REHABILITATION

## 2022-08-22 PROCEDURE — 128N000003 HC R&B REHAB

## 2022-08-22 PROCEDURE — 250N000011 HC RX IP 250 OP 636: Performed by: PHYSICAL MEDICINE & REHABILITATION

## 2022-08-22 PROCEDURE — 70450 CT HEAD/BRAIN W/O DYE: CPT

## 2022-08-22 PROCEDURE — 97161 PT EVAL LOW COMPLEX 20 MIN: CPT | Mod: GP

## 2022-08-22 PROCEDURE — 97535 SELF CARE MNGMENT TRAINING: CPT | Mod: GO | Performed by: STUDENT IN AN ORGANIZED HEALTH CARE EDUCATION/TRAINING PROGRAM

## 2022-08-22 PROCEDURE — 80048 BASIC METABOLIC PNL TOTAL CA: CPT

## 2022-08-22 PROCEDURE — 97530 THERAPEUTIC ACTIVITIES: CPT | Mod: GP

## 2022-08-22 PROCEDURE — 99233 SBSQ HOSP IP/OBS HIGH 50: CPT | Mod: GC | Performed by: PHYSICAL MEDICINE & REHABILITATION

## 2022-08-22 RX ORDER — WARFARIN SODIUM 3 MG/1
6 TABLET ORAL
Status: COMPLETED | OUTPATIENT
Start: 2022-08-22 | End: 2022-08-22

## 2022-08-22 RX ORDER — ONDANSETRON 4 MG/1
4 TABLET, FILM COATED ORAL EVERY 6 HOURS PRN
Status: DISCONTINUED | OUTPATIENT
Start: 2022-08-22 | End: 2022-08-27

## 2022-08-22 RX ADMIN — MYCOPHENOLATE MOFETIL 1500 MG: 500 TABLET, FILM COATED ORAL at 21:13

## 2022-08-22 RX ADMIN — WARFARIN SODIUM 6 MG: 3 TABLET ORAL at 17:48

## 2022-08-22 RX ADMIN — PANTOPRAZOLE SODIUM 40 MG: 40 TABLET, DELAYED RELEASE ORAL at 08:30

## 2022-08-22 RX ADMIN — Medication 10 MG: at 21:12

## 2022-08-22 RX ADMIN — SALINE NASAL SPRAY 2 SPRAY: 1.5 SOLUTION NASAL at 12:47

## 2022-08-22 RX ADMIN — DIGOXIN 125 MCG: 125 TABLET ORAL at 08:30

## 2022-08-22 RX ADMIN — SALINE NASAL SPRAY 2 SPRAY: 1.5 SOLUTION NASAL at 21:16

## 2022-08-22 RX ADMIN — Medication 37.5 MG: at 16:03

## 2022-08-22 RX ADMIN — Medication 37.5 MG: at 08:30

## 2022-08-22 RX ADMIN — MULTIPLE VITAMINS W/ MINERALS TAB 1 TABLET: TAB at 08:30

## 2022-08-22 RX ADMIN — SALINE NASAL SPRAY 2 SPRAY: 1.5 SOLUTION NASAL at 16:02

## 2022-08-22 RX ADMIN — MYCOPHENOLATE MOFETIL 1500 MG: 500 TABLET, FILM COATED ORAL at 08:30

## 2022-08-22 RX ADMIN — ACETAMINOPHEN 975 MG: 325 TABLET, FILM COATED ORAL at 21:13

## 2022-08-22 RX ADMIN — Medication 37.5 MG: at 00:27

## 2022-08-22 RX ADMIN — QUETIAPINE FUMARATE 150 MG: 50 TABLET ORAL at 21:13

## 2022-08-22 RX ADMIN — Medication 5 ML: at 21:18

## 2022-08-22 RX ADMIN — SERTRALINE HYDROCHLORIDE 100 MG: 100 TABLET ORAL at 08:30

## 2022-08-22 RX ADMIN — ASPIRIN 81 MG: 81 TABLET, CHEWABLE ORAL at 08:30

## 2022-08-22 RX ADMIN — LISINOPRIL 2.5 MG: 2.5 TABLET ORAL at 08:30

## 2022-08-22 RX ADMIN — HYDROXYCHLOROQUINE SULFATE 200 MG: 200 TABLET, FILM COATED ORAL at 08:30

## 2022-08-22 RX ADMIN — HYDROXYCHLOROQUINE SULFATE 200 MG: 200 TABLET, FILM COATED ORAL at 21:13

## 2022-08-22 RX ADMIN — QUETIAPINE FUMARATE 25 MG: 25 TABLET ORAL at 08:30

## 2022-08-22 RX ADMIN — ONDANSETRON HYDROCHLORIDE 4 MG: 4 TABLET, FILM COATED ORAL at 13:24

## 2022-08-22 RX ADMIN — SALINE NASAL SPRAY 2 SPRAY: 1.5 SOLUTION NASAL at 19:34

## 2022-08-22 RX ADMIN — QUETIAPINE FUMARATE 25 MG: 25 TABLET ORAL at 16:02

## 2022-08-22 RX ADMIN — ATORVASTATIN CALCIUM 40 MG: 40 TABLET, FILM COATED ORAL at 21:13

## 2022-08-22 RX ADMIN — Medication 3 ML: at 06:52

## 2022-08-22 RX ADMIN — ACETAMINOPHEN 975 MG: 325 TABLET, FILM COATED ORAL at 12:46

## 2022-08-22 RX ADMIN — SALINE NASAL SPRAY 2 SPRAY: 1.5 SOLUTION NASAL at 06:06

## 2022-08-22 ASSESSMENT — ACTIVITIES OF DAILY LIVING (ADL)
ADLS_ACUITY_SCORE: 22
ADLS_ACUITY_SCORE: 26
ADLS_ACUITY_SCORE: 22
IADLS,_PREVIOUS_FUNCTIONAL_LEVEL: INDEPENDENT
ADLS_ACUITY_SCORE: 22
BADLS,_PREVIOUS_FUNCTIONAL_LEVEL: INDEPENDENT
ADLS_ACUITY_SCORE: 26
ADLS_ACUITY_SCORE: 26
IADLS,_PREVIOUS_FUNCTIONAL_LEVEL: INDEPENDENT
ADLS_ACUITY_SCORE: 26
BADLS,_PREVIOUS_FUNCTIONAL_LEVEL: INDEPENDENT
ADLS_ACUITY_SCORE: 26
ADLS_ACUITY_SCORE: 22
ADLS_ACUITY_SCORE: 22

## 2022-08-22 NOTE — PHARMACY-MEDICATION REGIMEN REVIEW
Pharmacy Medication Regimen Review  Dandy Sands is a 61 year old male who is currently in the Acute Rehab Unit.    Assessment: All medications have an appropriate indications, durations and no unnecessary use was found    Plan:   Continue medications as ordered. Might want to add hold parameters on lisinopril at some point.  Attending provider will be sent this note for review.  If there are any emergent issues noted above, pharmacist will contact provider directly by phone.      Pharmacy will periodically review the resident's medication regimen for any PRN medications not administered in > 72 hours and discontinue them. The pharmacist will discuss gradual dose reductions of psychopharmacologic medications with interdisciplinary team on a regular basis.    Please contact pharmacy if the above does not answer specific medication questions/concerns.    Background:  A pharmacist has reviewed all medications and pertinent medical history today.  Medications were reviewed for appropriate use and any irregularities found are listed with recommendations.      Current Facility-Administered Medications:      acetaminophen (TYLENOL) tablet 975 mg, 975 mg, Oral, Q8H, Makenna Bermudez MD, 975 mg at 08/22/22 1246     aspirin (ASA) chewable tablet 81 mg, 81 mg, Oral, Daily, Makenna Bermudez MD, 81 mg at 08/22/22 0830     atorvastatin (LIPITOR) tablet 40 mg, 40 mg, Oral, QPM, Makenna Bermudez MD, 40 mg at 08/21/22 2112     digoxin (LANOXIN) tablet 125 mcg, 125 mcg, Oral, Daily, Padmini Linda MD, 125 mcg at 08/22/22 0830     fluticasone-vilanterol (BREO ELLIPTA) 100-25 MCG/INH inhaler 1 puff, 1 puff, Inhalation, Daily, Padmini Linda MD     heparin lock flush 10 UNIT/ML injection 5-20 mL, 5-20 mL, Intracatheter, Q24H, Abner Mathew MD, 5 mL at 08/21/22 2112     heparin lock flush 10 UNIT/ML injection 5-20 mL, 5-20 mL, Intracatheter, Q1H PRN, Abner Mathew MD, 3 mL at 08/22/22 0652     hydrALAZINE  (APRESOLINE) half-tab 37.5 mg, 37.5 mg, Oral, Q8H, Makenna Bermudez MD, 37.5 mg at 08/22/22 0830     hydroxychloroquine (PLAQUENIL) tablet 200 mg, 200 mg, Oral, BID, Padmini Linda MD, 200 mg at 08/22/22 0830     Lidocaine (LIDOCARE) 4 % Patch 1 patch, 1 patch, Transdermal, Q24H, Padmini Linda MD     lidocaine patch in PLACE, , Transdermal, Q8H CAMMY, Padmini Linda MD     lisinopril (ZESTRIL) tablet 2.5 mg, 2.5 mg, Oral, Daily, Padmini Linda MD, 2.5 mg at 08/22/22 0830     LORazepam (ATIVAN) tablet 0.5-1 mg, 0.5-1 mg, Oral, Q4H PRN, Padmini Linda MD     melatonin tablet 10 mg, 10 mg, Oral, At Bedtime, Padmini Linda MD, 10 mg at 08/21/22 2112     multivitamin w/minerals (THERA-VIT-M) tablet 1 tablet, 1 tablet, Oral, Daily, Padmini Linda MD, 1 tablet at 08/22/22 0830     mycophenolate (GENERIC EQUIVALENT) tablet 1,500 mg, 1,500 mg, Oral, BID, Padmini Linda MD, 1,500 mg at 08/22/22 0830     naloxone (NARCAN) injection 0.2 mg, 0.2 mg, Intravenous, Q2 Min PRN **OR** naloxone (NARCAN) injection 0.4 mg, 0.4 mg, Intravenous, Q2 Min PRN **OR** naloxone (NARCAN) injection 0.2 mg, 0.2 mg, Intramuscular, Q2 Min PRN **OR** naloxone (NARCAN) injection 0.4 mg, 0.4 mg, Intramuscular, Q2 Min PRN, Padmini Linda MD     ondansetron (ZOFRAN) tablet 4 mg, 4 mg, Oral, Q6H PRN, Abner Mathew MD, 4 mg at 08/22/22 1324     oxyCODONE (ROXICODONE) tablet 5 mg, 5 mg, Oral, Q6H PRN, Padmini Linda MD, 5 mg at 08/21/22 2111     pantoprazole (PROTONIX) EC tablet 40 mg, 40 mg, Oral, QAM AC, Padmini Linda MD, 40 mg at 08/22/22 0830     Patient is already receiving anticoagulation with heparin, enoxaparin (LOVENOX), warfarin (COUMADIN)  or other anticoagulant medication, , Does not apply, Continuous, Padmini Linda MD, Rate Verify at 08/21/22 1552     polyethylene glycol (MIRALAX) Packet 17 g, 17 g, Oral, Daily PRN, Padmini Linda MD     QUEtiapine (SEROquel) tablet 150 mg, 150 mg, Oral or Feeding Tube, At Bedtime,  Padmini Linda MD, 150 mg at 08/21/22 2155     QUEtiapine (SEROquel) tablet 25 mg, 25 mg, Oral, BID, Padmini Linda MD, 25 mg at 08/22/22 0830     QUEtiapine (SEROquel) tablet 50 mg, 50 mg, Oral, At Bedtime PRN, Padmini Linda MD     senna-docusate (SENOKOT-S/PERICOLACE) 8.6-50 MG per tablet 1-4 tablet, 1-4 tablet, Oral, BID PRN, Padmini Linda MD     sertraline (ZOLOFT) tablet 100 mg, 100 mg, Oral, Daily, Padmini Linda MD, 100 mg at 08/22/22 0830     sodium chloride (OCEAN) 0.65 % nasal spray 2 spray, 2 spray, Both Nostrils, Q4H, Padmini Linda MD, 2 spray at 08/22/22 1247     sodium chloride (PF) 0.9% PF flush 10-20 mL, 10-20 mL, Intracatheter, q1 min prn, Abner Mathew MD, 20 mL at 08/22/22 0652     sodium chloride (PF) 0.9% PF flush 10-40 mL, 10-40 mL, Intracatheter, Q8H, Abner Mathew MD, 10 mL at 08/21/22 2113     warfarin ANTICOAGULANT (COUMADIN) tablet 6 mg, 6 mg, Oral, ONCE at 18:00, Abner Mathew MD     Warfarin Dose Required Daily - Pharmacist Managed, 1 each, Oral, See Admin Instructions, Padmini Linda MD

## 2022-08-22 NOTE — PLAN OF CARE
Goal Outcome Evaluation:    Plan of Care Reviewed With: patient     Overall Patient Progress: no change    Outcome Evaluation: Patient admitted to unit today.    Orientation: A/Ox4  Bowel: No BM on this shift  Bladder: Continent of bladder using urinal at bedside with staff emptying  Pain: Complains of back pain. PRN Oxycodone given x1 with good effect per patient report  Ambulation/Transfers: CGA with walker for transfers and ambulation  Diet/ Liquids: Tolerating diet well with good appetite this shift  Tubes/ Lines/ Drains: PICC to RUE with red lumen patent and heparin locked. Purple lumen occluded. Sticky note left for provider  Skin: See LDAs for further details  Other: LVAD parameters WDL  Bed alarm on for safety, call light within reach. Continue with POC.

## 2022-08-22 NOTE — PROGRESS NOTES
CLINICAL NUTRITION SERVICES - ASSESSMENT NOTE     Nutrition Prescription    RECOMMENDATIONS FOR MDs/PROVIDERS TO ORDER:  Reviewed diet recommendations with rounding Resident and Resident adjusted diet order to just regular to allow all menu options to maximize nutritional intakes    Malnutrition Status:    Severe malnutrition in the context of acute illness or injury    Recommendations already ordered by Registered Dietitian (RD):  Chocolate Ensure once daily at breakfast meal (auto send) + PRN    Future/Additional Recommendations:  Monitor meal/supplement intakes, weight and lab trends      REASON FOR ASSESSMENT  Dandy Sands is a/an 61 year old male assessed by the dietitian for Positive Admission Nutrition Risk Screen and RN Consult - Patient reports weight loss since hospitalization    NUTRITION/MEDICAL HISTORY  Per chart review: Pt admits to ARU for rehabilitation in the setting of decompensated heart failure 2/2 cardiogenic shock c/b multiorgan failure requiring IABP, is s/p HM 3 LVAD on 7/8/2022, hospital course complicated by RV, had 29F Protek duo via RIJ, RVAD removed 7/21, hemopericardium requiring repeat washout, chest closed 7/13, and epistaxis. Other past medical history of SLE, antiphospholipid syndrome, history pulmonary embolism, HFrEF due to IMC, and HDL.    Per pt visit: RD visited pt at bedside, pt reports fair appetite, reviewed supplements, pt agreeing to above, reviewed weight history as below. Reviewed the importance of adequate nutritional intakes (kcals/pro) for continued recovery and work with therapies, encouraged consistent meal intakes, pt verbalizes understanding. Reviewed will discuss with Providers adjusting diet orders to allow pt all menu options to maximize nutritional intakes, pt agreeing with nutrition plan of care.     CURRENT NUTRITION ORDERS  Diet: Regular  Intake/Tolerance: 100% per flow sheets     LABS  Labs reviewed    MEDICATIONS  Coumadin, Zoloft, Lipitor, Lisinopril,  "Seroquel, Mycophenolate, MVI, Protonix     ANTHROPOMETRICS  Height: 170.2 cm (5' 7\")  Most Recent Weight: 59.7 kg (131 lb 9.6 oz)    IBW: 67.3 kg  BMI: Normal BMI  UBW: Pt reports around 150 lbs prior to initial hospitalization   Weight History:   07/08/22 62.6 kg (138 lb)   05/26/22 64.6 kg (142 lb 8 oz)     Weight assessment: Significant 5% weight loss in 1 month, significant 7.7% weight loss in 3 months.     Dosing Weight: 59.7 kg    ASSESSED NUTRITION NEEDS  Estimated Energy Needs: 2090+ kcals/day (35+ kcals/kg)  Justification: 6-8 weeks post transplant (LVAD), repletion   Estimated Protein Needs:  grams protein/day (1.2 - 2 grams of pro/kg)  Justification: 6-8 weeks post transplant (LVAD), repletion   Estimated Fluid Needs: 1 mL/kcal vs other   Justification: Maintenance vs Provider recommendations     MALNUTRITION  % Intake: Decreased intake does not meet criteria  % Weight Loss: > 7.5% in 3 months (severe)  Subcutaneous Fat Loss: Facial region: mild to moderate   Muscle Loss: Temporal, facial & jaw region: mild to moderate   Fluid Accumulation/Edema: None noted  Malnutrition Diagnosis: Severe malnutrition in the context of acute illness or injury     NUTRITION DIAGNOSIS  Increased nutrient needs related to post transplant as evidenced by therapeutic recommendations       INTERVENTIONS  Implementation  Nutrition Education: RD reviewed the importance of adequate nutritional intakes for continued recovery and weight stabilization    Collaboration with Provider - noted as above   Medical food supplement therapy - ordered as above     Goals  Patient to consume % of nutritionally adequate meal trays TID, or the equivalent with supplements/snacks.     Monitoring/Evaluation  Progress toward goals will be monitored and evaluated per protocol.    Virginia Schroeder RD, CNSC, LD  [facility] RD pager: [phone]  "

## 2022-08-22 NOTE — PROGRESS NOTES
Education session completed early due to patient experiencing numbness, tingling and nausea. Notified bedside RN who completed a bedside neuro exam and calling patient's providers. Head CT to be completed. Family at bedside. Will resume education tomorrow if appropriate.

## 2022-08-22 NOTE — PLAN OF CARE
Discharge Planner Post-Acute Rehab PT:     Discharge Plan: [facility] with son    Precautions: Sternal, falls    Current Status:  Bed Mobility: IND  Transfer: SBA   Gait: CGA with walker, limited by dizziness  Stairs: NT  Balance: SBA in standing without use of walker, some lateral sway noted    Assessment:  Pt demonstrating decreased strength, balance and activity tolerance.  -45 minutes this afternoon due to pt reporting not feeling well, limited by dizziness and nausea.  Pt SBA-CGA with walker for mobility in room.  Estimated LOS 10-14 days pending progress due to medical concerns.    Other Barriers to Discharge (DME, Family Training, etc): DME (might require walker), family training

## 2022-08-22 NOTE — PLAN OF CARE
Discharge Planner Post-Acute Rehab OT:     Discharge Plan: to [facility] with son     Precautions: fall, strernal precautions, LVAD    Current Status:  ADLs:  Mobility: CGA with walker for short distance  Grooming: setup seated  Dressing: setup for UB dressing, CGA for LB dressing, assist for donning LVAD holster  Bathing: no showers, TBD  Toileting: CGA for transfer and toileting  IADLs: son can assist  Vision/Cognition: wears glasses, cognition to be assessed as needed    Assessment: Eval completed, pt limited by dizziness and nausea. Need to progress activity tolerance and safety with moving around. Pt does not need much physical assist, more CGA usng the walker. ELOS 10 days pending progress.     Other Barriers to Discharge (DME, Family Training, etc): dizziness

## 2022-08-22 NOTE — PROGRESS NOTES
08/22/22 1000   Quick Adds   Type of Visit Initial PT Evaluation   Living Environment   People in Home alone   Current Living Arrangements house   Home Accessibility stairs to enter home   Number of Stairs, Main Entrance 2   Stair Railings, Main Entrance none   Transportation Anticipated family or friend will provide   Living Environment Comments Pt lives at home alone. 2 THANG, all living on one level.  Pt will be d/c to [facility] upon d/c with son.   Self-Care   Usual Activity Tolerance moderate   Current Activity Tolerance fair   Regular Exercise No   Equipment Currently Used at Home none   Activity/Exercise/Self-Care Comment Per pt he was IND with ADL's, had just started doing some OP PT proir to admission as pt was getting tired really quickly.   Previous Level of Function/Home Environm   Bathing/Grooming, Premorbid Functional Level independent   Dressing, Premorbid Functional Level independent   Eating/Feeding, Premorbid Functional Level independent   Toileting, Premorbid Functional Level independent   BADLs, Premorbid Functional Level independent   IADLs, Premorbid Functional Level independent   Bed Mobility, Premorbid Functional Level independent   Transfers, Premorbid Functional Level independent   Household Ambulation, Premorbid Functional Level independent   Stairs, Premorbid Functional Level independent   Community Ambulation, Premorbid Functional Level independent   General Information   Onset of Illness/Injury or Date of Surgery 08/21/22   Referring Physician Ashley Terry MD   Patient/Family Therapy Goals Statement (PT) Pt wants to go home   Pertinent History of Current Problem (include personal factors and/or comorbidities that impact the POC) 61 year old right hand dominant male with past medical history of SLE, antiphospholipid syndrome, history pulmonary embolism (on anticoagulation with warfarin), HFrEF due to IMC, HDL. He initially presented to [facility] on 6/13 due to  nausea, vomiting, abdominal bloating and transferred to [facility] for admission on 6/17/2022 for decompensated heart failure.  Noted to be in cardiogenic shock c/b multiorgan failure requiring mechanical support with a balloon pump (IABP).  He is currently S/P HM 3 LVAD on 7/8/2022   Existing Precautions/Restrictions sternal   Weight-Bearing Status - LUE full weight-bearing   Weight-Bearing Status - RUE full weight-bearing   Weight-Bearing Status - LLE full weight-bearing   Weight-Bearing Status - RLE full weight-bearing   General Observations Activity: up with assist   Cognition   Affect/Mental Status (Cognition) WFL;low arousal/lethargic   Orientation Status (Cognition) oriented x 4   Pain Assessment   Patient Currently in Pain No   Integumentary/Edema   Integumentary/Edema Comments No edema noted   Posture    Posture Protracted shoulders;Forward head position   Range of Motion (ROM)   Range of Motion ROM is WNL   Strength (Manual Muscle Testing)   Strength Comments B ankles 5/5, B knee flex/ext 5/5, B hip flex 4/5, B hip abd/ext 3+/5   Balance   Balance Comments SBA for sitting balance, CGA with walker for standing, limite dby dizziness; able to complete partial tandem with SBA but not full tandem, rhomberg with SBA, unable to stand on 1 foot   Sensory Examination   Sensory Perception WNL   Sensory Perception Comments Light touch intact   Coordination   Coordination no deficits were identified   Muscle Tone   Muscle Tone no deficits were identified   Clinical Impression   Criteria for Skilled Therapeutic Intervention Yes, treatment indicated   PT Diagnosis (PT) Impaired functional mobility   Influenced by the following impairments Decreased strength, balance and activity tolerance   Functional limitations due to impairments Inability to complete functional mobility at baesline level of functioning   Clinical Presentation (PT Evaluation Complexity) Stable/Uncomplicated   Clinical Presentation Rationale Clinical  judgement   Clinical Decision Making (Complexity) low complexity   Planned Therapy Interventions (PT) balance training;gait training;home exercise program;postural re-education;stair training;strengthening;ROM (range of motion);transfer training;progressive activity/exercise;risk factor education;home program guidelines   Anticipated Equipment Needs at Discharge (PT)   (4WW)   Risk & Benefits of therapy have been explained evaluation/treatment results reviewed;care plan/treatment goals reviewed;risks/benefits reviewed;current/potential barriers reviewed;participants voiced agreement with care plan;participants included;patient   Clinical Impression Comments Pt would beneit from IP PT to progress strength, balance and activity tolerance to ensure safety with d/c home.   Plan of Care Review   Plan of Care Reviewed With patient   Total Evaluation Time   Total Evaluation Time (Minutes) 30   Physical Therapy Goals   PT Frequency Daily   PT Predicted Duration/Target Date for Goal Attainment 08/31/22   PT Goals Bed Mobility;Transfers;Gait;Stairs;Aerobic Activity;PT Goal 1   PT: Bed Mobility Independent   PT: Transfers Independent;Within precautions   PT: Gait Independent;Greater than 200 feet   PT: Stairs Independent;2 stairs   PT: Perform aerobic activity with stable cardiovascular response continuous activity;20 minutes   PT: Goal 1 Pt will be able to complete car transfer with SBA in order to ensure safety with d/c home.

## 2022-08-22 NOTE — PLAN OF CARE
Occupational Therapy Discharge Summary    Reason for therapy discharge:    Discharged to acute rehabilitation facility.    Progress towards therapy goal(s). See goals on Care Plan in [facility] electronic health record for goal details.  Goals not met.  Barriers to achieving goals:   discharge from facility.    Therapy recommendation(s):    Continued therapy is recommended.  Rationale/Recommendations:  To promote greater ADL/IADL independence and strength.

## 2022-08-22 NOTE — PROGRESS NOTES
[facility]    Medicine Progress Note - Hospitalist Service    Date of Admission:  8/21/2022    Assessment & Plan        Dandy Sands is a 61 year old male with complex medical issues. Past medical history includes SLE ,  antiphospholipid syndrome , history pulmonary embolism   on anticoagulation with warfarin, HFrEF due to IMC, HDL.  [facility]  [facility] vomiting abdominal bloating and was transferred to [facility]/17  With decompensated heart failure, cardiogenic shock, multiorgan failure. Did require IABP  And is now status post  HM3 LVAD 7/8/22 by Dr Zamora. Surgery was complicated with RV failure  Had 29F Protek duo via RIJ, RVAD removed 7/21 , hemipericardium  requiring repeat washout, chest was closed 7/13/22. Also had significant epistaxis and required intubation 8/7  for airway protection, patient  Removed his own nasal packing. He was  extubated  8/8 and doing well.         He was transferred to acute inpatient rehab 8/21/2022 for further rehab and cares       CNS, PSYCH   -insomnia, continue melatonin   -Delirium   -CT head negative for acute intracranial pathology 8/7   - continue sertraline and quetiapine 150 mg at night and 25 mg bid     -depression, continue zoloft, had been on ativan PTA but has not been getting so did not reorder       addendum got called ~ 2PM  That patient  Was having right arm numbness and facial numbness. Came to see pt, apparently symptoms stared 1 to 1.5 hr ago, both sides of face was numb mainly around nose and right  arm was numb, no right leg weakness or numbness. It went away during my exam.    there was no cranial nerve abnormality, PEARLY EOMI,  normal finger to nose bilat, good and = strengths in both arms legs, no decreased sensations      CT head came back without any acute intracranial pathology     His INR is over 2 , has had low flow in AM 8:42 but symptoms were much later, MAP 65 ,  discussed with  Cardiology , recommended head ST to make sure there is no bleed/abnormality. Will start q 2 hr neuro checks          CARDIOVASCULAR   -HFrEF 2/2 ICM s/p HM3 LVAD in setting of cardiogenic shock (SCAI D)    was evaluated for heart transplant  But due to elevated DSAs decision  was made to proceed with   LVAD likely destination     had low flow alarm 8/16 , had  Work up  And no clear etiology found     CTA 8/20  normal flows     TAVARES 8/20 underfilling of LV and seed decreased 5300->5200 and no alarms since , continue to monitor closely     Currently on hydralazine  37.5 tid, digoxin 125 mcg  RV support  , was  on captopril 12.5 mg tid and was  transitioned to lisinopril 2.5 mg daily  8/20      goal MAP to be bellow 80, medications to be adjusted by cardiology      Pharmacy to dose warfarin, INR goal 2-3      continue aspirin 81 mg     needs EP evaluation for increasing capture threshold of RV lead     Call  [phone], [phone]    Low flow alarm 8/22 8:42 AM, now good discussed with  Cardiology  And encourage po fluids at this point   -RV failure s/p Protek duo temporary RVAD s/p decannulation 7/21    was on high dose pressors , now on blood pressure  Medications    -RV thrombus    On AC with warfarin   -post chest closure hemopericardium s/p repeat washout (7/12)    status post  Closure   -PMH CAD    s/p PCI to LAD  in 2005, NTEMI 2007 and RCA , had CER-D 2006 , coronary angio 5/2022 showed severe ostial LAD disease with instent restenosis  Mid LAD   Involved diagonal bifurcation  Mild LCx disease and severe prox RCA disease     CABG was considered but decision was made for  medical therapy in light of his low EF and cardiac MRI viability study showed nonviable myocardium in LAD territory    -S/p CRT-D (2006  -history of severe mitral regurgitation     Not surgical candidate         PULMONARY  --required intubation for airway protection form epistaxis 8/7 and extubated 8/8  - mild-moderate  Emphysema,  "seen by pulmonary, stared continue Breao Ellipta ,  have PRN inhalers available, PFT as able     - iatrogenic apical PTX, resolved  - PNA, was treated with cefepime and vancomycin  8/7-8/12   -history previous pulmonary embolism , had been on chronic warfarin therapy      ENT  -epistaxis needing intubation for airway protection while on AC and aspirin , status post  cauterisation by ENT  - history nasal perforation history elective procedure in [place]   - was to raffi  Ayr nasal saline gel but has not been getting for some time continue saline nasal spary qid    - if needs O2 add humidity   - per ENT \" If bleeding recurs, spray Afrin (3 sprays) and hold firm digital pressure over the soft part of the nose for 20 minutes continuously. Nasal clips are ineffective and should not be used in place of digital pressure. If bleeding continues despite these measures, repeat. If bleeding continues or is severe please contact ENT\"     ID  -E.faecalis on culture from PICC line (8/2/22) - 1/2 tubes in 1/4 sets collected 8/2, S.epidermidis (MSSE) on culture from PICC line (8/2/22) - 1/2 tubes in 1/4 sets collected 8/2  - was on cefepime and vancomycin , now off antibiotics   -treated for oral thrush for 14 days   -history COVID-19 1/2022 ( needed intubation )        GI  -hematemesis, black tarry stool , ws seen by GI and was felt was due to epistaxis and not GI bleed , on PPI   -congestive hepatopathy, shocked liver on presentation, now LFT back to normal    -Dysphasia, now on regular diet      HEME  -acute blood loss anemia on chronic Fe deficiency  anemia   -history DVT and pulmonary embolism PTA was on chronic anticoagulation with warfarin and now back on   - history antiphospholipid ab syndrome   -on chronic anticoagulation PTA an dis on warfarin as above       RHEUMATOLOGY  -SLE, back on PTA hydroxychloroquine since 7/17  -PTA cellcept 1500 mg po bid and ordered to   Restart on rehab per  Cardiology       RENAL  -normal " renal functions        -BPH, had been on flomax 0.4 mg before, currently not on      ENDO  -HD, continue lipitor              Diet: Combination Diet Regular Diet; Thin Liquids (level 0); Low Fat Diet  Room Service    DVT Prophylaxis: warfarin   Fitzgerald Catheter: Not present  Central Lines: PRESENT  PICC Double Lumen 08/15/22 Right Brachial vein lateral-Site Assessment: WDL except;Red  Cardiac Monitoring: None  Code Status: Full Code      Disposition Plan    TBD      The patient's care was discussed with the care team    Makenna Bermudez MD  Hospitalist Service  [employer]  Securely message with the Vocera Web Console (learn more here)  Text page via [employer] Paging/Directory         Clinically Significant Risk Factors Present on Admission             # Hypoalbuminemia: Albumin = 3.2 g/dL (Ref range: 3.5 - 5.2 g/dL) on admission, will monitor as appropriate   # Coagulation Defect: home medication list includes an anticoagulant medication   # Hypertension: home medication list includes antihypertensive(s)  # End stage heart failure: LVAD present         ______________________________________________________________________    Interval History    Doing ok, still gets some dizziness that has been going on during hospitalizations as well, no shortness of breath , occ nausea but no vomiting no diarrhea      Data reviewed today: I reviewed all medications, new labs and imaging results over the last 24 hours.  Physical Exam   Vital Signs: Temp: (!) 96.7  F (35.9  C) Temp src: Oral BP: 103/86 (MAP(91)) Pulse: 96   Resp: 18 SpO2: 99 % O2 Device: None (Room air)    Weight: 131 lbs 9.6 oz  HEENT: EOMI and PEARLA, sclera nonicteric, moist mucus membranes, head atraumatic  NECK: supple  RESPIRATORY: lungs are clear  CARDIOVASCULAR: normal S1S2 regular sounds of LVAD , wounds look good   GASTROINTESTINAL: abdomen is  soft,  non-distended, non-tender with normal bowel sounds.     MUSCULOSKELETAL: normal muscle bulk and tone  SKIN: warm and dry, no rashes, no mottling noted   NEUROLOGIC: awake alert and oriented, no focal deficits found,  EXTREMITIES: no clubbing cyanosis or edema noted  moves all extremities     Data   Recent Labs   Lab 08/22/22  0653 08/21/22  0510 08/20/22  0520   WBC 6.9 6.0 6.1   HGB 10.1* 9.5* 9.8*   MCV 94 96 95    243 262   INR 2.47* 2.44* 2.59*    136 139   POTASSIUM 4.1 4.1 4.4   CHLORIDE 108 104 106   CO2 26 24 24   BUN 10 12.1 11.4   CR 0.58* 0.53* 0.60*   ANIONGAP 3 8 9   ELDA 9.5 9.1 9.4   GLC 97 90 88   ALBUMIN  --  3.2* 3.4*   PROTTOTAL  --  6.7 7.0   BILITOTAL  --  0.3 0.4   ALKPHOS  --  146* 142*   ALT  --  13 12   AST  --  24 25     No results found for this or any previous visit (from the past 24 hour(s)).

## 2022-08-22 NOTE — PLAN OF CARE
FOCUS/GOAL  Medical management    ASSESSMENT, INTERVENTIONS AND CONTINUING PLAN FOR GOAL:  Pt is alert and oriented. No complaints of pain. Assist of 1 with walker. Continent of bladder using urinal. Pt very lethargic, falling asleep mid conversation. At times requiring touch to awaken. LVAD WDL. R PICC in place. Appeared to be sleeping on rounds.

## 2022-08-22 NOTE — PROGRESS NOTES
[facility]   Acute Rehabilitation Unit  Daily progress note    INTERVAL HISTORY  Dandy Sands was seen and examined at bedside.  Noted back pain last night which resolved with PRN oxycodone.  No acute events reported overnight.  Endorses sleep to be so-so but has been quite okay in general.  Discussed with him on diet modification to possible regular post dietitian follow-up.  For dizziness we discussed with him on trying compression socks and abdominal binder to assist with orthostatics.    10 point ROS is negative except what is in HPI.    Functionally   Pending initial review PT/OT  OT on 8/22 evaluation: Pt is below baseline  due tp post op precuations, pain, dizziness, strength and will benefit from skilled OT to improve IND.    Today's Updates  - compression socks and abdominal binder for orthostatics.  - Changed diet to regular  - See LVAD parameters.     MEDICATIONS    acetaminophen  975 mg Oral Q8H     aspirin  81 mg Oral Daily     atorvastatin  40 mg Oral QPM     digoxin  125 mcg Oral Daily     fluticasone-vilanterol  1 puff Inhalation Daily     heparin lock flush  5-20 mL Intracatheter Q24H     hydrALAZINE  37.5 mg Oral Q8H     hydroxychloroquine  200 mg Oral BID     Lidocaine  1 patch Transdermal Q24H     lidocaine   Transdermal Q8H CAMMY     lisinopril  2.5 mg Oral Daily     melatonin  10 mg Oral At Bedtime     multivitamin w/minerals  1 tablet Oral Daily     mycophenolate  1,500 mg Oral BID     pantoprazole  40 mg Oral QAM AC     QUEtiapine  150 mg Oral or Feeding Tube At Bedtime     QUEtiapine  25 mg Oral BID     sertraline  100 mg Oral Daily     sodium chloride  2 spray Both Nostrils Q4H     sodium chloride (PF)  10-40 mL Intracatheter Q8H     Warfarin Therapy Reminder  1 each Oral See Admin Instructions        heparin lock flush, LORazepam, naloxone **OR** naloxone **OR** naloxone **OR** naloxone, oxyCODONE, polyethylene glycol, QUEtiapine, senna-docusate,  "sodium chloride (PF)     PHYSICAL EXAM  BP (!) 89/72 (BP Location: Left arm)   Pulse 92   Temp 96.8  F (36  C) (Oral)   Resp 18   Ht 1.702 m (5' 7\")   Wt 59.7 kg (131 lb 9.6 oz)   SpO2 97%   BMI 20.61 kg/m    General: Laying in bed.  No acute distress  HEENT: NC/NT, Moist mucous membranes  Pulmonary: Breathing comfortably on room air, clear airways on auscultation bilaterally.  Cardiovascular: Notable LVAD running and  humming.  Abdominal: Non-tender, non-distended, bowel sounds present in all four quadrants   Extremities: warm, well perfused, no edema in bilateral lower extremities, no tenderness in calves, power 5/5 bilateral upper and lower extremities.  Neuro/MSK: Alert and oriented, face symmetric.    LABS  Recent Results (from the past 24 hour(s))   INR    Collection Time: 08/22/22  6:53 AM   Result Value Ref Range    INR 2.47 (H) 0.85 - 1.15   Basic metabolic panel    Collection Time: 08/22/22  6:53 AM   Result Value Ref Range    Sodium 137 133 - 144 mmol/L    Potassium 4.1 3.4 - 5.3 mmol/L    Chloride 108 94 - 109 mmol/L    Carbon Dioxide (CO2) 26 20 - 32 mmol/L    Anion Gap 3 3 - 14 mmol/L    Urea Nitrogen 10 7 - 30 mg/dL    Creatinine 0.58 (L) 0.66 - 1.25 mg/dL    Calcium 9.5 8.5 - 10.1 mg/dL    Glucose 97 70 - 99 mg/dL    GFR Estimate >90 >60 mL/min/1.73m2   CBC with platelets and differential    Collection Time: 08/22/22  6:53 AM   Result Value Ref Range    WBC Count 6.9 4.0 - 11.0 10e3/uL    RBC Count 3.47 (L) 4.40 - 5.90 10e6/uL    Hemoglobin 10.1 (L) 13.3 - 17.7 g/dL    Hematocrit 32.5 (L) 40.0 - 53.0 %    MCV 94 78 - 100 fL    MCH 29.1 26.5 - 33.0 pg    MCHC 31.1 (L) 31.5 - 36.5 g/dL    RDW 17.0 (H) 10.0 - 15.0 %    Platelet Count 272 150 - 450 10e3/uL    % Neutrophils 71 %    % Lymphocytes 16 %    % Monocytes 9 %    % Eosinophils 3 %    % Basophils 1 %    % Immature Granulocytes 0 %    NRBCs per 100 WBC 0 <1 /100    Absolute Neutrophils 5.0 1.6 - 8.3 10e3/uL    Absolute Lymphocytes 1.1 0.8 - " 5.3 10e3/uL    Absolute Monocytes 0.6 0.0 - 1.3 10e3/uL    Absolute Eosinophils 0.2 0.0 - 0.7 10e3/uL    Absolute Basophils 0.1 0.0 - 0.2 10e3/uL    Absolute Immature Granulocytes 0.0 <=0.4 10e3/uL    Absolute NRBCs 0.0 10e3/uL       Rehabilitation - continue comprehensive acute inpatient rehabilitation program with multidisciplinary approach including therapies, rehab nursing, and physiatry following. See interval history for updates.      ASSESSMENT AND PLAN  Dandy Sands is a 61 year old right hand dominant male with past medical history of SLE, antiphospholipid syndrome, history pulmonary embolism (on anticoagulation with warfarin), HFrEF due to IMC, HDL. He initially presented to [facility] on 6/13 due to nausea, vomiting, abdominal bloating and transferred to [facility] for admission on 6/17/2022 for decompensated heart failure 2/2 cardiogenic shock c/b multiorgan failure requiring IABP, is s/p HM 3 LVAD on 7/8/2022. Stay was also c/b RV, had 29F Protek duo via RIJ, RVAD removed 7/21, hemopericardium requiring repeat washout, chest was closed 7/13. Other complication epistaxis on 8/6 ENT. Patient has h/o nasal perforation that required elective procedure (in [place]) which was postponed due to LVAD insertion.  He required intubation 8/7 for airway protection, was extubated 8/8.  Nasal pack removed 8/9.     Admission to acute inpatient rehab: Cardiac; s/p HM3 LVAD.    Impairment group code: 09        1. PT, OT 90 minutes of each on a daily basis, in addition to rehab nursing and close management of physiatrist.       2. Impairment of ADL's: Impairment in strength, endurance, and ROM resulting in functional limitations in patient's ability to perform ADLs.     3. Impairment of mobility:  Impairment in strength, endurance, and ROM resulting in functional limitations in patient's ability to perform functional mobility.     4. Impairment of cognition/language/swallow:  N/A     5. Medical  Conditions     #HFrEF 2/2 ICM s/p HM3 LVAD in setting of cardiogenic shock (SCAI D)  #RV failure s/p Protek duo temporary RVAD s/p decannulation 7/21  #RV thrombus  #Post chest closure hemopericardium s/p repeat washout (7/12)  #PMH CAD s/p PCI to LAD (2005)  #S/p CRT-D (2006)  Patient with ICM s/p HM3 LVAD 7/8/22 2/2 cardiogenic shock requiring MCS with IABP as prior to HM (initially evaluated for heart transplant, however noted to have substantially elevated DSAs during course of care, so decision made to proceed with LVAD given patient was already on temporary MCS). Intraoperative course during LVAD placement was c/b RVF resulting in cardiogenic shock requiring high dose pressors necessitating temporary RVAD support. Initial post-op course c/b hemopericardium requiring repeat washout with chest left open, with good recovery extubation and decannulation on 7/21. Patient tolerating hemodynamics and end organ standpoint well. He has had intermittent low flow alarms with high PI, no power spike, and a closed aortic valve on TTE. TTE also showed underfilling LV which could explain his low flow alarms d/t suckdown event so his LVAD speed was reduced to 5200. Patient did have a low flow alarm on 8/20 @ 1145 (PI 8-9 and not hypotensive); CTA chest done which showed that the LVAD outflow tract is widely patent. Patient is euvolemic on exam, but at times has had orthostasis symptoms; patient encouraged to stay hydrated within his fluid/diet restriction guidelines (no more than 2L/day of fluid intake and no more than 3g of Na per day in dietary intake).  P:  LVAD:  - interrogation showed 1 low flow alarm in last 24 hours as described above  - Is euvolemic 8/21  - Diuretics: Not needed at this time; monitor volume status and weight and consider starting low dose Lasix if he gains weight or starts showing signs of hypervolemia  - Afterload reduction: MAP goal 65-80; Hydralazine 37.5 TID + Lisinopril 2.5 qday  - Statin:  Atorvastatin 40mg  - BB: Holding in setting of recent cardiogenic shock and orthostatic symptoms, consider restarting outpatient  - AA: Holding in setting of recent cardiogenic shock and orthostatic symptoms, consider restarting outpatient  - SGLTi: As outpatient, has been held due to immunosuppression he is on for his SLE.  - Warfarin for INR goal 2-3  - ASA 81 mg qday     #Epistaxis, resolved  #Intubated due to Concern for airway protection (8/7)-Extubated (8/8)  #Mild-moderate emphysema  #History of COVID-19  #Iatrogenic R apical PTX  H/o nasal perforation  For elective procedure (in [place]), postponed 2/2 LVAD insertion. Epistaxis not fixed by packing overnight on 8/6 and pt continued to bleed. Intubated for airway protection. Controlled at bedside by ENT s/p cautery and packing. Extubated 8/8. Had Cefepime/Vanc empirically for broad coverage for PNA (8/7-8/12). Patient removed his own nasal packing overnight on 8/9. ENT no longer following.    - C/t Ayr nasal saline gel at bedtime + nasal saline q4H  -  followup with his ENT as OP (see below)     #Insomnia/Delirium, resolved  Some concern that he had a fall overnight on 8/6. Discussed with nursing staff and he did NOT have a fall, although he did try to get out of bed. CT head was obtained (8/7) and it was unremarkable. Patient had some issues with delirium and insomnia during hospitalization and psychiatry was consulted who recommended  - Sertraline and Quetiapine per psych recs     # ? Orthostatic  Noted to be dizzy with therapy.  Has occasional hypotension with SBP high 80s.  May benefit from compressions.  -Compression socks and, abdominal binder 8/22    #GERD  #Congestive hepatopathy in the setting of cardiac insuffiency - Resolved  #Hematemesis / oral mucosal bleeding -resolved    #Dysphagia  GI consulted 7/31 for black tarry stools and a possible GI bleed, however it is more likely swallowed blood from epistaxis that patient previously had. GI did  not recommend an upper endoscopy. Recurrence of bloody stools likely due to epistaxis which have resolved. Hemoglobin was monitored and continued to be stable throughout the rest of the hospitalization. Cleared for regular diet by SLP.     #Anemia due to blood loss from Epistaxis-Resolved  #History of DVT and PE   #Antiphospholipid antibody syndrome  #History of iron deficiency anemia  As noted above, likely related to epistaxis. After above interventions were complete and management was initiated per ENT recs, hemoglobin remained stable and no further evidence of bleeding.     #SLE  - PTA meds: hydroxychloroquine 200mg, resumed 7/16  - Restart PTA Cellcept at discharge     #Oral thrush, resolved  Completed 14 days of nystatin        6. Adjustment to disability:  Clinical psychology to eval and treat as needed  7. FEN:  Regular with thins  8. Bowel: As needed MiraLAX and Senokot  9. Bladder: Pending review  10. DVT Prophylaxis: Warfarin goal 2-3 for LVAD, daily INR, and ASA 81  11. GI Prophylaxis: Pantoprazole 40 mg daily  12. Code: Full  13. Disposition: Home with homecare and Home with outpatient therapy  14. ELOS: 12 to 14 days.  15. Rehab prognosis: Fair   16. Follow up Appointments on Discharge:   -PCP follow-up 2 to 3 weeks : Requires CBC BMP  -Follow-up with [facility] at next available appointment and follow-up with your cardiologist in the next few months at next available appointment   - Follow-up with psychiatry post discharge  - Followup with his ENT in [place] to rediscuss possible procedure for his nasal perforation vs. conservative management       Patient seen and discussed with Dr. Mathew, PM&R Staff Physician    Padmini Linda MD   Resident Physician, PGY-2   Physical Medicine and Rehabilitation  [facility]

## 2022-08-22 NOTE — PLAN OF CARE
Goal Outcome Evaluation:    Plan of Care Reviewed With: patient     Overall Patient Progress: improving    Outcome Evaluation: RD visited pt at bedside, reviewed importrance of adequtae intakes and will start supplements. See RD progress notes for full assessment details.

## 2022-08-22 NOTE — PROGRESS NOTES
"   08/22/22 0640   Quick Adds   Type of Visit Initial Occupational Therapy Evaluation   Living Environment   People in Home alone   Current Living Arrangements house   Transportation Anticipated family or friend will provide   Living Environment Comments 3STE, pt has a cat, son and duaghter live locally. Pt will stay at [place] [facility] with son for 30 days   Self-Care   Usual Activity Tolerance moderate   Current Activity Tolerance fair   Equipment Currently Used at Home none   Fall history within last six months yes   Number of times patient has fallen within last six months 3  (fell 3 times at hospital from dizziness)   Activity/Exercise/Self-Care Comment PLOF was IN, drive, started disaability and ptr was a diesal . P likes to play pool and put together models.   Previous Level of Function/Home Environm   Bathing/Grooming, Premorbid Functional Level independent   Dressing, Premorbid Functional Level independent   Eating/Feeding, Premorbid Functional Level independent   Toileting, Premorbid Functional Level independent   BADLs, Premorbid Functional Level independent   IADLs, Premorbid Functional Level independent   Bed Mobility, Premorbid Functional Level independent   Transfers, Premorbid Functional Level independent   Household Ambulation, Premorbid Functional Level independent   Stairs, Premorbid Functional Level independent   Community Ambulation, Premorbid Functional Level independent   Functional Cognition, Premorbid Functional Level IND   General Information   Onset of Illness/Injury or Date of Surgery 08/21/22   Referring Physician Dr Ashley Terry   Patient/Family Therapy Goal Statement (OT) to be able to walk without getting dizzy.   Additional Occupational Profile Info/Pertinent History of Current Problem Per chart \"61 year old right hand dominant male with past medical history of SLE, antiphospholipid syndrome, history pulmonary embolism (on anticoagulation with warfarin), HFrEF due to IMC, " "HDL. He initially presented to [facility] on 6/13 due to nausea, vomiting, abdominal bloating and transferred to [facility] for admission on 6/17/2022 for decompensated heart failure.  Noted to be in cardiogenic shock c/b multiorgan failure requiring mechanical support with a balloon pump (IABP).  He is currently S/P HM 3 LVAD on 7/8/2022.  His stay was also complicated by RV failure, had 29F Protek duo via RIJ, RVAD removed 7/21, hemopericardium requiring repeat washout, chest was closed 7/13.  Other notable complications significant epistaxis on 8/6 night which was packed by ENT. Patient has h/o nasal perforation that required elective procedure (in [place]) which was postponed due to LVAD insertion.  He required intubation 8/7 for airway protection, was extubated 8/8.  Nasal pack removed 8/9.\"   Existing Precautions/Restrictions fall;sternal  (LVAD)   Cognitive Status Examination   Cognitive Status Comments No change pr pt's report. Pt needingcues for walker safety, will continue to monitor   Visual Perception   Impact of Vision Impairment on Function (Vision) no change   Sensory   Sensory Comments pt denies   Pain Assessment   Patient Currently in Pain Yes, see Vital Sign flowsheet  (back)   Strength Comprehensive (MMT)   Comment, General Manual Muscle Testing (MMT) Assessment unable to assess duer to sternal precautions, overall deconditioned   Coordination   Coordination Comments no change   Balance   Balance Comments LOB in sitting and standing when dizzy   Clinical Impression   Criteria for Skilled Therapeutic Interventions Met (OT) Yes, treatment indicated   OT Diagnosis ADL and mobility deficits   Influenced by the following impairments strength, pain, post op precautions, dizziness   OT Problem List-Impairments impacting ADL problems related to;activity tolerance impaired;strength;pain;post-surgical precautions   ADL comments/analysis ADL deficits   Assessment of Occupational Performance 5 or more " Performance Deficits   Identified Performance Deficits deficits impact all aspects of ADL and IADL independence   Planned Therapy Interventions (OT) ADL retraining;IADL retraining;strengthening;transfer training   Clinical Decision Making Complexity (OT) moderate complexity   Anticipated Equipment Needs Upon Discharge (OT)   (TBD)   Risk & Benefits of therapy have been explained evaluation/treatment results reviewed;care plan/treatment goals reviewed   Clinical Impression Comments Pt is below baseline  due tp post op precuations, pain, dizziness, strength and will benefit from skilled OT to improve IND.   Total Evaluation Time (Minutes)   Total Evaluation Time (Minutes) 14   OT Goals   Therapy Frequency (OT) Daily   OT Predicted Duration/Target Date for Goal Attainment 09/05/22   OT: Hygiene/Grooming modified independent   OT: Upper Body Dressing Modified independent   OT: Lower Body Dressing Modified independent   OT: Upper Body Bathing Modified independent   OT: Lower Body Bathing Modified independent   OT: Toilet Transfer/Toileting Modified independent;cleaning and garment management;toilet transfer   OT: Meal Preparation Modified independent;with simple meal preparation   OT: Goal 1 Pt will be  MOD I with UB exercises program to build strength and endurance for ADL, within sternal precautions  (Pt will be  MOD I with UB exercises program to build strength and endurance for ADL, within sternal precautions.)

## 2022-08-23 ENCOUNTER — APPOINTMENT (OUTPATIENT)
Dept: PHYSICAL THERAPY | Facility: CLINIC | Age: 62
DRG: 949 | End: 2022-08-23
Attending: PHYSICAL MEDICINE & REHABILITATION
Payer: COMMERCIAL

## 2022-08-23 ENCOUNTER — APPOINTMENT (OUTPATIENT)
Dept: OCCUPATIONAL THERAPY | Facility: CLINIC | Age: 62
DRG: 949 | End: 2022-08-23
Attending: PHYSICAL MEDICINE & REHABILITATION
Payer: COMMERCIAL

## 2022-08-23 LAB
HOLD SPECIMEN: NORMAL
INR PPP: 2.57 (ref 0.85–1.15)

## 2022-08-23 PROCEDURE — 85260 CLOT FACTOR X STUART-POWER: CPT | Performed by: INTERNAL MEDICINE

## 2022-08-23 PROCEDURE — 250N000012 HC RX MED GY IP 250 OP 636 PS 637

## 2022-08-23 PROCEDURE — 97110 THERAPEUTIC EXERCISES: CPT | Mod: GP

## 2022-08-23 PROCEDURE — 97116 GAIT TRAINING THERAPY: CPT | Mod: GP | Performed by: PHYSICAL THERAPIST

## 2022-08-23 PROCEDURE — 99233 SBSQ HOSP IP/OBS HIGH 50: CPT | Mod: GC | Performed by: PHYSICAL MEDICINE & REHABILITATION

## 2022-08-23 PROCEDURE — 250N000011 HC RX IP 250 OP 636: Performed by: PHYSICAL MEDICINE & REHABILITATION

## 2022-08-23 PROCEDURE — 250N000013 HC RX MED GY IP 250 OP 250 PS 637

## 2022-08-23 PROCEDURE — 250N000013 HC RX MED GY IP 250 OP 250 PS 637: Performed by: PHYSICAL MEDICINE & REHABILITATION

## 2022-08-23 PROCEDURE — 97530 THERAPEUTIC ACTIVITIES: CPT | Mod: GO | Performed by: STUDENT IN AN ORGANIZED HEALTH CARE EDUCATION/TRAINING PROGRAM

## 2022-08-23 PROCEDURE — 36592 COLLECT BLOOD FROM PICC: CPT

## 2022-08-23 PROCEDURE — 128N000003 HC R&B REHAB

## 2022-08-23 PROCEDURE — 85610 PROTHROMBIN TIME: CPT

## 2022-08-23 PROCEDURE — 97535 SELF CARE MNGMENT TRAINING: CPT | Mod: GO | Performed by: STUDENT IN AN ORGANIZED HEALTH CARE EDUCATION/TRAINING PROGRAM

## 2022-08-23 PROCEDURE — 36592 COLLECT BLOOD FROM PICC: CPT | Performed by: INTERNAL MEDICINE

## 2022-08-23 PROCEDURE — 97530 THERAPEUTIC ACTIVITIES: CPT | Mod: GP | Performed by: PHYSICAL THERAPIST

## 2022-08-23 PROCEDURE — 97530 THERAPEUTIC ACTIVITIES: CPT | Mod: GP

## 2022-08-23 PROCEDURE — 99233 SBSQ HOSP IP/OBS HIGH 50: CPT | Performed by: INTERNAL MEDICINE

## 2022-08-23 PROCEDURE — 250N000013 HC RX MED GY IP 250 OP 250 PS 637: Performed by: INTERNAL MEDICINE

## 2022-08-23 RX ORDER — WARFARIN SODIUM 5 MG/1
5 TABLET ORAL
Status: COMPLETED | OUTPATIENT
Start: 2022-08-23 | End: 2022-08-23

## 2022-08-23 RX ADMIN — OXYCODONE HYDROCHLORIDE 5 MG: 5 TABLET ORAL at 21:14

## 2022-08-23 RX ADMIN — ASPIRIN 81 MG: 81 TABLET, CHEWABLE ORAL at 08:57

## 2022-08-23 RX ADMIN — LISINOPRIL 2.5 MG: 2.5 TABLET ORAL at 09:41

## 2022-08-23 RX ADMIN — ACETAMINOPHEN 975 MG: 325 TABLET, FILM COATED ORAL at 20:34

## 2022-08-23 RX ADMIN — Medication 5 ML: at 16:18

## 2022-08-23 RX ADMIN — DIGOXIN 125 MCG: 125 TABLET ORAL at 09:41

## 2022-08-23 RX ADMIN — MYCOPHENOLATE MOFETIL 1500 MG: 500 TABLET, FILM COATED ORAL at 08:56

## 2022-08-23 RX ADMIN — Medication 5 ML: at 06:27

## 2022-08-23 RX ADMIN — QUETIAPINE FUMARATE 25 MG: 25 TABLET ORAL at 16:40

## 2022-08-23 RX ADMIN — ATORVASTATIN CALCIUM 40 MG: 40 TABLET, FILM COATED ORAL at 20:34

## 2022-08-23 RX ADMIN — PANTOPRAZOLE SODIUM 40 MG: 40 TABLET, DELAYED RELEASE ORAL at 08:57

## 2022-08-23 RX ADMIN — ACETAMINOPHEN 975 MG: 325 TABLET, FILM COATED ORAL at 12:36

## 2022-08-23 RX ADMIN — OXYCODONE HYDROCHLORIDE 5 MG: 5 TABLET ORAL at 00:16

## 2022-08-23 RX ADMIN — HYDROXYCHLOROQUINE SULFATE 200 MG: 200 TABLET, FILM COATED ORAL at 20:35

## 2022-08-23 RX ADMIN — Medication 5 ML: at 20:38

## 2022-08-23 RX ADMIN — MULTIPLE VITAMINS W/ MINERALS TAB 1 TABLET: TAB at 08:57

## 2022-08-23 RX ADMIN — SALINE NASAL SPRAY 2 SPRAY: 1.5 SOLUTION NASAL at 12:34

## 2022-08-23 RX ADMIN — Medication 37.5 MG: at 00:06

## 2022-08-23 RX ADMIN — WARFARIN SODIUM 5 MG: 5 TABLET ORAL at 17:46

## 2022-08-23 RX ADMIN — QUETIAPINE FUMARATE 150 MG: 50 TABLET ORAL at 21:14

## 2022-08-23 RX ADMIN — SERTRALINE HYDROCHLORIDE 100 MG: 100 TABLET ORAL at 08:57

## 2022-08-23 RX ADMIN — ACETAMINOPHEN 975 MG: 325 TABLET, FILM COATED ORAL at 05:36

## 2022-08-23 RX ADMIN — Medication 37.5 MG: at 09:41

## 2022-08-23 RX ADMIN — Medication 10 MG: at 21:14

## 2022-08-23 RX ADMIN — HYDROXYCHLOROQUINE SULFATE 200 MG: 200 TABLET, FILM COATED ORAL at 09:41

## 2022-08-23 RX ADMIN — Medication 37.5 MG: at 16:40

## 2022-08-23 RX ADMIN — MYCOPHENOLATE MOFETIL 1500 MG: 500 TABLET, FILM COATED ORAL at 20:34

## 2022-08-23 RX ADMIN — QUETIAPINE FUMARATE 25 MG: 25 TABLET ORAL at 08:57

## 2022-08-23 ASSESSMENT — ACTIVITIES OF DAILY LIVING (ADL)
ADLS_ACUITY_SCORE: 26

## 2022-08-23 NOTE — PLAN OF CARE
Patient is alert and oriented x 4, neuros inatct. Lvad flow fluctuating between 2.9 and 3,  MAP is 85. patient had intermittent dizziness which is not new, other L vad parameters are WDL, L vad coordinator notified and Hospitalist came and saw patient.  Flow rechecked and it is now 3.3.Currently the patient is stable and participating in PT, will continue POC      Goal Outcome Evaluation:

## 2022-08-23 NOTE — PROGRESS NOTES
VAD Education limited today due to lethargy. Pt reports sleeping poorly last night. Reviewed content from last week and pt's recall was only moderate. Discussed at length potential causes of low flow alarms and measures that have been taken to determine cause (echo, CTA, lowering speed, bp management, off diuretics). Encouraged more liberal po fluid and food intake.   Will continue with education through this week. Anticipate that pt will be able to complete edu and testing this Friday, 8/26, or next Monday, 8/29.     Please continue with modified dressing change: no skin prep, place gauze over open or rashed skin, do not place tape over open or rashed skin, use 3M medipore tape around borders to secure dressing rather than adhesive drape in kit.

## 2022-08-23 NOTE — PLAN OF CARE
Discharge Planner Post-Acute Rehab OT:     Discharge Plan: to [place] [facility] with son     Precautions: fall, strernal precautions, LVAD, lucero hose and abdominal binder to be worn due to low BP    Current Status:  ADLs:    Mobility: CGA with walker for short distance    Grooming: setup, close SBA standing    Dressing: setup for UB dressing, CGA for LB dressing, assist for donning LVAD holster, assist for lucero hose    Bathing: no showers, UB sponge bath set up assist and close SBA standing, pt declined LB washing    Toileting: CGA for transfer and toileting  IADLs: son can assist  Vision/Cognition: wears glasses, cognition to be assessed as needed    Assessment: Pt with low flow alarm when he first sat up but then flow increased back up to 2.9-3.1. Nurse aware. Pt with less dizziness initially duringt ADL and thus more steady. Towards end of session pt had 2 dizzy episodes. Pt wearing lucero hose but has not received abdominal binder yet. Encouraged pt to eat breakfast.     Other Barriers to Discharge (DME, Family Training, etc): dizziness

## 2022-08-23 NOTE — PROGRESS NOTES
[facility]   Acute Rehabilitation Unit  Daily progress note    INTERVAL HISTORY  Dandy Sands was seen and examined at bedside. He note his LVAD continues to have low flow at least once everyday, each time he has been asymptomatic. Yesterday did have about an 1 hr of some numbness and tingling RUE and face in the afternoon which team decided to f/u with Head CT which was neg. He had no other neurological changes. Complains of tiredness this morning as was woken up at night for nasal (Ocean) sprays, discussed about adjusting the times to only when he is awake.  He denies dizziness, fever, chills, CP, SOB, Abdominal discomfort, or new W/N/T. LBM     10 point ROS is negative except what is in HPI.    Functionally   OT   ADLs:    Mobility: CGA with walker for short distance    Grooming: setup, close SBA standing    Dressing: setup for UB dressing, CGA for LB dressing, assist for donning LVAD holster, assist for lucero hose    Bathing: no showers, UB sponge bath set up assist and close SBA standing, pt declined LB washing    Toileting: CGA for transfer and toileting  IADLs: son can assist  Vision/Cognition: wears glasses, cognition to be assessed as needed.  PT 8/22:  Bed Mobility: IND  Transfer: SBA   Gait: CGA with walker, limited by dizziness  Stairs: NT  Balance: SBA in standing without use of walker, some lateral sway noted  Last session was limited by dizziness.    Today's Updates  - Monitor BM will review Bowel Med plan is still no recorded BM.   - Adjust Nasal spray to Q4H while awake.    MEDICATIONS    acetaminophen  975 mg Oral Q8H     aspirin  81 mg Oral Daily     atorvastatin  40 mg Oral QPM     digoxin  125 mcg Oral Daily     fluticasone-vilanterol  1 puff Inhalation Daily     heparin lock flush  5-20 mL Intracatheter Q24H     hydrALAZINE  37.5 mg Oral Q8H     hydroxychloroquine  200 mg Oral BID     Lidocaine  1 patch Transdermal Q24H     lidocaine   Transdermal Q8H CAMMY  "    lisinopril  2.5 mg Oral Daily     melatonin  10 mg Oral At Bedtime     multivitamin w/minerals  1 tablet Oral Daily     mycophenolate  1,500 mg Oral BID     pantoprazole  40 mg Oral QAM AC     QUEtiapine  150 mg Oral or Feeding Tube At Bedtime     QUEtiapine  25 mg Oral BID     sertraline  100 mg Oral Daily     sodium chloride  2 spray Both Nostrils Q4H     sodium chloride (PF)  10-40 mL Intracatheter Q8H     Warfarin Therapy Reminder  1 each Oral See Admin Instructions        heparin lock flush, LORazepam, naloxone **OR** naloxone **OR** naloxone **OR** naloxone, ondansetron, oxyCODONE, polyethylene glycol, QUEtiapine, senna-docusate, sodium chloride (PF)     PHYSICAL EXAM  /86 (BP Location: Left arm)   Pulse 83   Temp (!) 96  F (35.6  C) (Oral)   Resp 18   Ht 1.702 m (5' 7\")   Wt 59.7 kg (131 lb 9.6 oz)   SpO2 96%   BMI 20.61 kg/m    General: Sitting up in bed and not in acute distress  HEENT: NC/NT, Moist mucous membranes  Pulmonary: Breathing comfortably on room air  Cardiovascular: Notable LVAD running and  humming.  Abdominal: Non-tender, non-distended, bowel sounds present, abdominal binder in place.   Extremities: warm, well perfused, no edema in bilateral lower extremities, no tenderness in calves, power 5/5 bilateral upper and lower extremities.  Neuro/MSK: Alert and oriented, face symmetric.    LABS  Recent Results (from the past 24 hour(s))   INR    Collection Time: 08/23/22  6:35 AM   Result Value Ref Range    INR 2.57 (H) 0.85 - 1.15       Rehabilitation - continue comprehensive acute inpatient rehabilitation program with multidisciplinary approach including therapies, rehab nursing, and physiatry following. See interval history for updates.      ASSESSMENT AND PLAN  Dandy Sands is a 61 year old right hand dominant male with past medical history of SLE, antiphospholipid syndrome, history pulmonary embolism (on anticoagulation with warfarin), HFrEF due to IMC, HDL. He initially " presented to [facility] on 6/13 due to nausea, vomiting, abdominal bloating and transferred to [facility] for admission on 6/17/2022 for decompensated heart failure 2/2 cardiogenic shock c/b multiorgan failure requiring IABP, is s/p HM 3 LVAD on 7/8/2022. Stay was also c/b RV, had 29F Protek duo via RIJ, RVAD removed 7/21, hemopericardium requiring repeat washout, chest was closed 7/13. Other complication epistaxis on 8/6 ENT. Patient has h/o nasal perforation that required elective procedure (in [place]) which was postponed due to LVAD insertion.  He required intubation 8/7 for airway protection, was extubated 8/8.  Nasal pack removed 8/9.     Admission to acute inpatient rehab: Cardiac; s/p HM3 LVAD.    Impairment group code: 09        1. PT, OT 90 minutes of each on a daily basis, in addition to rehab nursing and close management of physiatrist.       2. Impairment of ADL's: Impairment in strength, endurance, and ROM resulting in functional limitations in patient's ability to perform ADLs.     3. Impairment of mobility:  Impairment in strength, endurance, and ROM resulting in functional limitations in patient's ability to perform functional mobility.     4. Impairment of cognition/language/swallow:  N/A     5. Medical Conditions     #HFrEF 2/2 ICM s/p HM3 LVAD in setting of cardiogenic shock (SCAI D)  #RV failure s/p Protek duo temporary RVAD s/p decannulation 7/21  #RV thrombus  #Post chest closure hemopericardium s/p repeat washout (7/12)  #PMH CAD s/p PCI to LAD (2005)  #S/p CRT-D (2006)  Patient with ICM s/p HM3 LVAD 7/8/22 2/2 cardiogenic shock requiring MCS with IABP as prior to HM (initially evaluated for heart transplant, however noted to have substantially elevated DSAs during course of care, so decision made to proceed with LVAD given patient was already on temporary MCS). Intraoperative course during LVAD placement was c/b RVF resulting in cardiogenic shock requiring high dose pressors  necessitating temporary RVAD support. Initial post-op course c/b hemopericardium requiring repeat washout with chest left open, with good recovery extubation and decannulation on 7/21. Patient tolerating hemodynamics and end organ standpoint well. He has had intermittent low flow alarms with high PI, no power spike, and a closed aortic valve on TTE. TTE also showed underfilling LV which could explain his low flow alarms d/t suckdown event so his LVAD speed was reduced to 5200. Patient did have a low flow alarm on 8/20 @ 1145 (PI 8-9 and not hypotensive); CTA chest done which showed that the LVAD outflow tract is widely patent. Patient is euvolemic on exam, but at times has had orthostasis symptoms; patient encouraged to stay hydrated within his fluid/diet restriction guidelines (no more than 2L/day of fluid intake and no more than 3g of Na per day in dietary intake).  P:  LVAD:  - interrogation showed 1 low flow alarm in last 24 hours as described above  - Is euvolemic 8/21  - Diuretics: Not needed at this time; monitor volume status and weight and consider starting low dose Lasix if he gains weight or starts showing signs of hypervolemia  - Afterload reduction: MAP goal 65-80; Hydralazine 37.5 TID + Lisinopril 2.5 qday  - Statin: Atorvastatin 40mg  - BB: Holding in setting of recent cardiogenic shock and orthostatic symptoms, consider restarting outpatient  - AA: Holding in setting of recent cardiogenic shock and orthostatic symptoms, consider restarting outpatient  - SGLTi: As outpatient, has been held due to immunosuppression he is on for his SLE.  - Warfarin for INR goal 2-3  - ASA 81 mg qday     #Epistaxis, resolved  #Intubated due to Concern for airway protection (8/7)-Extubated (8/8)  #Mild-moderate emphysema  #History of COVID-19  #Iatrogenic R apical PTX  H/o nasal perforation  For elective procedure (in [place]), postponed 2/2 LVAD insertion. Epistaxis not fixed by packing overnight on 8/6 and pt  continued to bleed. Intubated for airway protection. Controlled at bedside by ENT s/p cautery and packing. Extubated 8/8. Had Cefepime/Vanc empirically for broad coverage for PNA (8/7-8/12). Patient removed his own nasal packing overnight on 8/9. ENT no longer following.    - C/t Ayr nasal saline gel at bedtime + nasal saline q4H  -  followup with his ENT as OP (see below)     #Insomnia/Delirium, resolved  Some concern that he had a fall overnight on 8/6. Discussed with nursing staff and he did NOT have a fall, although he did try to get out of bed. CT head was obtained (8/7) and it was unremarkable. Patient had some issues with delirium and insomnia during hospitalization and psychiatry was consulted who recommended  - Sertraline and Quetiapine per psych recs     # ? Orthostatic  Noted to be dizzy with therapy.  Has occasional hypotension with SBP high 80s.  May benefit from compressions.  -Compression socks and, abdominal binder 8/22    #GERD  #Congestive hepatopathy in the setting of cardiac insuffiency - Resolved  #Hematemesis / oral mucosal bleeding -resolved    #Dysphagia  GI consulted 7/31 for black tarry stools and a possible GI bleed, however it is more likely swallowed blood from epistaxis that patient previously had. GI did not recommend an upper endoscopy. Recurrence of bloody stools likely due to epistaxis which have resolved. Hemoglobin was monitored and continued to be stable throughout the rest of the hospitalization. Cleared for regular diet by SLP.     #Anemia due to blood loss from Epistaxis-Resolved  #History of DVT and PE   #Antiphospholipid antibody syndrome  #History of iron deficiency anemia  As noted above, likely related to epistaxis. After above interventions were complete and management was initiated per ENT recs, hemoglobin remained stable and no further evidence of bleeding.  - Saline (OCEAN) nasal sprays Q4H when awake.     #SLE  - PTA meds: hydroxychloroquine 200mg, resumed  7/16  - Restart PTA Cellcept at discharge     #Oral thrush, resolved  Completed 14 days of nystatin        6. Adjustment to disability:  Clinical psychology to eval and treat as needed  7. FEN:  Regular with thins  8. Bowel: As needed MiraLAX and Senokot  9. Bladder: Pending review  10. DVT Prophylaxis: Warfarin goal 2-3 for LVAD, daily INR, and ASA 81  11. GI Prophylaxis: Pantoprazole 40 mg daily  12. Code: Full  13. Disposition: Home with homecare and Home with outpatient therapy  14. ELOS: 12 to 14 days.  15. Rehab prognosis: Fair   16. Follow up Appointments on Discharge:   -PCP follow-up 2 to 3 weeks : Requires CBC BMP  -Follow-up with [facility] at next available appointment and follow-up with your cardiologist in the next few months at next available appointment   - Follow-up with psychiatry post discharge  - [facility] [place] [facility] for [facility]ation vs. conservative management       Patient seen and discussed with Dr. Mathew, PM&R Staff Physician    Padmini iLnda MD   Resident Physician, PGY-2   Physical Medicine and Rehabilitation  [facility]

## 2022-08-23 NOTE — PLAN OF CARE
Goal Outcome Evaluation:    Overall Patient Progress: improving    Outcome Evaluation: Numbness to both side of face and R hand gone since start of pm shift, resolved. Continues to be alert and oriented x4. With pain on lower back, scheduled Tylenol given. Ate 100% for supper. Continent of bladder using the urinal. No bm this shift. LVAD parameters met, MAP =78 per doppler. Used his call light appropriately.

## 2022-08-23 NOTE — PROGRESS NOTES
The RN for Dandy called to report some intermittent dizziness when Dandy was working with OT. VAD numbers were 5200, 2.9 flow, 8.6 PI, 3.8 persaud and a doppler MAP of 85. The RN had already paged the intensivist who is the provider for today. I suggested that the blood pressure may be too high and more afterload reduction may help bring down the PI and bring up the flow.    The RN will have the intensivist page the VAD coordinator with any questions.

## 2022-08-23 NOTE — PLAN OF CARE
Discharge Planner Post-Acute Rehab PT:     Discharge Plan: [facility] with son    Precautions: Sternal, falls    Current Status:  Bed Mobility: IND  Transfer: SBA   Gait: 85 ft CGA with walker, limited by dizziness  Stairs: 4 stairs (6''), CGA  Balance: SBA in standing without use of walker, some lateral sway noted    Assessment:  Pt continues to be limited by weakness and fatigue but showed improvement in tolerance as compared to yesterday's session. Declines wearing abdominal binder, no report of dizziness in PM. Continue to progress pt's endurance with OOB activity toward discharge goals.    Other Barriers to Discharge (DME, Family Training, etc): DME (might require walker), family training

## 2022-08-23 NOTE — PROGRESS NOTES
[facility]    Medicine Progress Note - Hospitalist Service    Date of Admission:  8/21/2022    Assessment & Plan        aDndy Sands is a 60 yo male with medical history of SLE, antiphospholipid antibody syndrome, PE on anticoagulation with warfarin, HFrEF due to ICM and HDL.  He initially presented to [facility] in [place] on 6/13/22 with nausea, vomiting and abd bloating.  Transferred to [facility] on 6/17/22 w/ decompensated heart failure, cardiogenic shock and multiorgan failure.  He  required IABP and then underwent HM3 LVAD placement on 7/8/22 by Dr Zamora.  Surgery was complicated with RV failure requiring 29F Protek duo via RIJ RVAD placement ( removed 7/21/22), hemopericardium requiring repeat washout, chest was closed 7/13/22.  Other post LVAD placement complication included epistaxis requiring intubation 8/7/22 for airway protection.  Pt removed his own nasal packing.  He was eventually extubated.  He was transferred to acute inpatient rehab 8/21/2022 for further rehab and cares       HFrEF 2/2 ICM s/p HM3 LVAD in setting of cardiogenic shock   RV failure s/p Protek duo temporary RVAD, s/p decannulation 7/21  RV thrombus  Post chest closure hemopericardium, s/p repeat washout (7/12)  H/o CAD s/p PCI to LAD (2005), S/p CRT-D (2006), NTEMI 2007   ---   Coronary angio 5/2022 showed severe ostial LAD disease with instent restenosis, mid LAD involved diagonal bifurcation, mild LCx disease and severe prox RCA disease   ---   Evaluated for heart transplant but due to elevated DSAs and decision was made to proceed with LVAD likely as destination therapy  ---   CABG was considered but decision was made for medical therapy in light of his low EF and cardiac MRI viability study showed nonviable myocardium in LAD territory    ---   Had low flow alram on 8/6/22, work up was negative, no clear etiology found   ---   CTA 8/20 normal flows   ---   " TAVARES 8/20 underfilling of LV and seed decreased 5300 -> 5200 and no alarms since  ---   Continue hydralazine 37.5 tid and lisinopril 2.5 mg daily for afterload reduction  ---   Continue ASA, digoxin and atorvastatin  ---   Pt to wear abd binder and compression stocking  ---   Goal MAP to be below 80   ---   On coumadin for RV thrombus w/ INR goal 2-3    ---   Needs EP evaluation for increasing capture threshold of RV lead   ---   LVAD coordinator # [id number] or [phone]    Acute hypoxic respiratory failure   ---   Intubated 8/7 for airway protection due to severe epistaxis  ---   Extubated 8/8 w/o any difficulties  ---   Has underlying mild-moderate Emphysema  ---   Seen by pulmonary consult service  ---   Continue Breao Ellipta, prn inhalers, PFT as able     ---   Iatrogenic apical PTX, resolved    ---   Respiratory failure resolved    PE  H/o antiphospholipid antibody syndrome  ---   Continue Coumadin, pharmacy is dosing  ---   INR is 2.57 today   ---   He will need factor X chromogenic assay monitoring, will discuss w/ Pharmacy    Epistaxis  ---   Due to Coumadin + ASA  ---   Has a history of nasal perforation and underwent elective procedure in [place]  ---   He required intubation for airway protection   ---   Seen by ENT consult service and underwent catheterization   ---   Patient removed his own nasal packing  ---   Extubated w/o any difficulties  ---   Epistaxis resolved  ---   Per ENT \" If bleeding recurs, spray Afrin (3 sprays) and hold firm digital pressure over the soft part of the nose for 20 minutes continuously.  Nasal clips are ineffective and should not be used in place of digital pressure.  If bleeding continues despite these measures, repeat. If bleeding continues or is severe please contact ENT\"     E.faecalis bacteremia  ---   Blood culture from PICC line (8/2/22), 1 out 2 bottles grew staph epi (probably contaminant) and the other bottle grew E. Faecalis.    ---   NGTD from subsequent " blood cultures   ---   He received Cefepime and Vancomycin, 8/7 - 8/12/22  ---   Treated for oral thrush for 14 days   ---   Has a h/o COVID-19 infection 1/2022 ( needed intubation )        Hematemesis  Black tarry stool   ---   He was seen by GI and was felt was due to epistaxis and not GI bleed   ---   on PPI   ---   Congestive hepatopathy, shocked liver on presentation, now LFT back to normal    ---   Dysphasia post intubation, resolved and now on regular diet     Extremity and facial numbness 8/22/22  ---   Had non-focal neurologic exam  ---   CT head without contrast was negative for acute intracranial pathology  ---   Patient was completely asymptomatic when seen this morning, 8/23/22    Delirium   Depression  Insomnia  ---   CT head negative for acute intracranial pathology 8/7/22   ---   MS has been clear past couple days  ---   Continue sertraline   ---   Continue quetiapine 150 mg at night and 25 mg bid     ---   Continue melatonin       Acute blood loss anemia  superimposed on BRENDA  ---   Hgb stable at 10 g  ---   Not on Fe supplement     SLE  ---   Continue PTA hydroxychloroquine and cellcept 1500 mg po bid     Severe Malnutrition  ---   Based on nutrition assessment   ---   Will provide nutritional supplement            Diet: Room Service  Combination Diet Regular Diet; Thin Liquids (level 0)  Snacks/Supplements Adult: Ensure Enlive; With Meals    DVT Prophylaxis: warfarin   Fitzgerald Catheter: Not present  Central Lines: PRESENT       Cardiac Monitoring: None  Code Status: Full Code      Disposition Plan   TBD            Dave Gallardo MD  Hospitalist Service  [facility]  Securely message with the Vocera Web Console (learn more here)  Text page via [employer] Paging/Directory           ____________________________________________________________________    Interval History    No complaints this morning.  Uneventful night.  Alert this morning, LVAD low prescription  flow TCU alarm came on but that has resolved without any intervention.  Pt was sitting comfortable in a chair when seen this am    Data reviewed today: I reviewed all medications, new labs and imaging results over the last 24 hours.  Physical Exam   Vital Signs: Temp: (!) 96  F (35.6  C) Temp src: Oral BP: 103/86 (MAP(91)) Pulse: 83   Resp: 18 SpO2: 96 % O2 Device: None (Room air)    Weight: 131 lbs 9.6 oz  General: aao x 3, NAD.  HEENT:  NC/AT, neck supple  CVS:  NL s 1 and s2, no m/r/g. LVAD sound audible  Lungs:  Bibasilar rhonchi, no wheezing   Abd:  Soft, + bs, NT, no rebound or gaurding, no fluid shift.  Ext:  No c/c.  Lymph:  No edema.  Neuro:  Nonfocal.  Musculoskeletal: No calf tenderness to palpation.    Skin:  No rash.  Psychiatry:  Mood and affect appropriate.      Data   Recent Labs   Lab 08/23/22  0635 08/22/22  0653 08/21/22  0510 08/20/22  0520   WBC  --  6.9 6.0 6.1   HGB  --  10.1* 9.5* 9.8*   MCV  --  94 96 95   PLT  --  272 243 262   INR 2.57* 2.47* 2.44* 2.59*   NA  --  137 136 139   POTASSIUM  --  4.1 4.1 4.4   CHLORIDE  --  108 104 106   CO2  --  26 24 24   BUN  --  10 12.1 11.4   CR  --  0.58* 0.53* 0.60*   ANIONGAP  --  3 8 9   ELDA  --  9.5 9.1 9.4   GLC  --  97 90 88   ALBUMIN  --   --  3.2* 3.4*   PROTTOTAL  --   --  6.7 7.0   BILITOTAL  --   --  0.3 0.4   ALKPHOS  --   --  146* 142*   ALT  --   --  13 12   AST  --   --  24 25     Recent Results (from the past 24 hour(s))   CT Head w/o Contrast    Narrative    EXAM: CT HEAD W/O CONTRAST  8/22/2022 2:54 PM     HISTORY:  pt with LVAD, right arm numbness       COMPARISON:  Head CT 8/7/2022    TECHNIQUE: Using multidetector thin collimation helical acquisition  technique, axial, coronal and sagittal CT images from the skull base  to the vertex were obtained without intravenous contrast.   (topogram) image(s) also obtained and reviewed.    FINDINGS:  No intracranial hemorrhage, mass effect, or midline shift. No acute  loss of gray-white  matter differentiation in the cerebral hemispheres.  Ventricles are proportionate to the cerebral sulci. Clear basal  cisterns. Moderate diffuse cerebral atrophy with nonspecific patchy  periventricular and subcortical white matter hypoattenuation.  Atherosclerotic calcifications of the carotid siphons.    The bony calvaria and the bones of the skull base are normal. Mucosal  thickening versus mucous retention cyst in the visualized left  maxillary sinus. Frothy mucus in the left sphenoid locule. Remaining  paranasal sinuses are clear. Left mastoid effusion. Grossly normal  orbits.       Impression    IMPRESSION:   1. No acute intracranial pathology. No acute loss of gray-white  differentiation.  2. Moderate diffuse cerebral atrophy with patchy periventricular and  subcortical white matter hypo-attenuation, likely sequelae of chronic  small vessel ischemic disease.  3. Frothy mucus in the right sphenoid locule which is nonspecific but  can be seen in patients with acute sinusitis in the correct clinical  setting.    I have personally reviewed the examination and initial interpretation  and I agree with the findings.    NIDA CEE MD         SYSTEM ID:  [id number]

## 2022-08-23 NOTE — PLAN OF CARE
FOCUS/GOAL  Medical management    ASSESSMENT, INTERVENTIONS AND CONTINUING PLAN FOR GOAL:  Pt requested to sleep tonight on pm shift but he has midnight medication and neuro checks. Map is 78, scheduled hydralazine given. LVAD parameters check (see flow sheet). C/o rt shoulder pain, prn oxycodone given. Use urinal for voiding. Slept intermittently. Cont w/ POC.  Goal Outcome Evaluation:    Plan of Care Reviewed With: patient     Overall Patient Progress: no change    Outcome Evaluation: Continue to promote sleep at night.

## 2022-08-24 ENCOUNTER — APPOINTMENT (OUTPATIENT)
Dept: OCCUPATIONAL THERAPY | Facility: CLINIC | Age: 62
DRG: 949 | End: 2022-08-24
Attending: PHYSICAL MEDICINE & REHABILITATION
Payer: COMMERCIAL

## 2022-08-24 ENCOUNTER — APPOINTMENT (OUTPATIENT)
Dept: PHYSICAL THERAPY | Facility: CLINIC | Age: 62
DRG: 949 | End: 2022-08-24
Attending: PHYSICAL MEDICINE & REHABILITATION
Payer: COMMERCIAL

## 2022-08-24 LAB
FACT X ACT/NOR PPP CHRO: 24 % (ref 70–130)
INR PPP: 2.47 (ref 0.85–1.15)

## 2022-08-24 PROCEDURE — 250N000013 HC RX MED GY IP 250 OP 250 PS 637: Performed by: PHYSICAL MEDICINE & REHABILITATION

## 2022-08-24 PROCEDURE — 99233 SBSQ HOSP IP/OBS HIGH 50: CPT | Mod: GC | Performed by: PHYSICAL MEDICINE & REHABILITATION

## 2022-08-24 PROCEDURE — 250N000013 HC RX MED GY IP 250 OP 250 PS 637

## 2022-08-24 PROCEDURE — 250N000013 HC RX MED GY IP 250 OP 250 PS 637: Performed by: INTERNAL MEDICINE

## 2022-08-24 PROCEDURE — 85610 PROTHROMBIN TIME: CPT

## 2022-08-24 PROCEDURE — 97110 THERAPEUTIC EXERCISES: CPT | Mod: GP | Performed by: PHYSICAL THERAPIST

## 2022-08-24 PROCEDURE — 97530 THERAPEUTIC ACTIVITIES: CPT | Mod: GP | Performed by: PHYSICAL THERAPIST

## 2022-08-24 PROCEDURE — 97110 THERAPEUTIC EXERCISES: CPT | Mod: GO

## 2022-08-24 PROCEDURE — 250N000011 HC RX IP 250 OP 636: Performed by: PHYSICAL MEDICINE & REHABILITATION

## 2022-08-24 PROCEDURE — 128N000003 HC R&B REHAB

## 2022-08-24 PROCEDURE — 36592 COLLECT BLOOD FROM PICC: CPT

## 2022-08-24 PROCEDURE — 250N000012 HC RX MED GY IP 250 OP 636 PS 637

## 2022-08-24 PROCEDURE — 99233 SBSQ HOSP IP/OBS HIGH 50: CPT | Performed by: INTERNAL MEDICINE

## 2022-08-24 PROCEDURE — 97535 SELF CARE MNGMENT TRAINING: CPT | Mod: GO

## 2022-08-24 RX ORDER — WARFARIN SODIUM 3 MG/1
6 TABLET ORAL
Status: COMPLETED | OUTPATIENT
Start: 2022-08-24 | End: 2022-08-24

## 2022-08-24 RX ADMIN — ACETAMINOPHEN 975 MG: 325 TABLET, FILM COATED ORAL at 15:21

## 2022-08-24 RX ADMIN — Medication 37.5 MG: at 23:54

## 2022-08-24 RX ADMIN — Medication 5 ML: at 21:10

## 2022-08-24 RX ADMIN — ATORVASTATIN CALCIUM 40 MG: 40 TABLET, FILM COATED ORAL at 21:08

## 2022-08-24 RX ADMIN — Medication 10 MG: at 21:07

## 2022-08-24 RX ADMIN — HYDROXYCHLOROQUINE SULFATE 200 MG: 200 TABLET, FILM COATED ORAL at 09:27

## 2022-08-24 RX ADMIN — HYDROXYCHLOROQUINE SULFATE 200 MG: 200 TABLET, FILM COATED ORAL at 21:08

## 2022-08-24 RX ADMIN — MYCOPHENOLATE MOFETIL 1500 MG: 500 TABLET, FILM COATED ORAL at 09:27

## 2022-08-24 RX ADMIN — ACETAMINOPHEN 975 MG: 325 TABLET, FILM COATED ORAL at 21:08

## 2022-08-24 RX ADMIN — ASPIRIN 81 MG: 81 TABLET, CHEWABLE ORAL at 09:28

## 2022-08-24 RX ADMIN — QUETIAPINE FUMARATE 150 MG: 50 TABLET ORAL at 21:08

## 2022-08-24 RX ADMIN — OXYCODONE HYDROCHLORIDE 5 MG: 5 TABLET ORAL at 21:35

## 2022-08-24 RX ADMIN — QUETIAPINE FUMARATE 50 MG: 25 TABLET ORAL at 00:56

## 2022-08-24 RX ADMIN — QUETIAPINE FUMARATE 50 MG: 25 TABLET ORAL at 23:57

## 2022-08-24 RX ADMIN — Medication 37.5 MG: at 15:59

## 2022-08-24 RX ADMIN — SALINE NASAL SPRAY 2 SPRAY: 1.5 SOLUTION NASAL at 21:07

## 2022-08-24 RX ADMIN — PANTOPRAZOLE SODIUM 40 MG: 40 TABLET, DELAYED RELEASE ORAL at 09:28

## 2022-08-24 RX ADMIN — Medication 5 ML: at 05:42

## 2022-08-24 RX ADMIN — SALINE NASAL SPRAY 2 SPRAY: 1.5 SOLUTION NASAL at 17:47

## 2022-08-24 RX ADMIN — WARFARIN SODIUM 6 MG: 3 TABLET ORAL at 17:47

## 2022-08-24 RX ADMIN — Medication 37.5 MG: at 09:29

## 2022-08-24 RX ADMIN — MULTIPLE VITAMINS W/ MINERALS TAB 1 TABLET: TAB at 09:29

## 2022-08-24 RX ADMIN — LISINOPRIL 2.5 MG: 2.5 TABLET ORAL at 09:27

## 2022-08-24 RX ADMIN — QUETIAPINE FUMARATE 25 MG: 25 TABLET ORAL at 09:32

## 2022-08-24 RX ADMIN — QUETIAPINE FUMARATE 25 MG: 25 TABLET ORAL at 16:00

## 2022-08-24 RX ADMIN — Medication 37.5 MG: at 00:48

## 2022-08-24 RX ADMIN — DIGOXIN 125 MCG: 125 TABLET ORAL at 09:29

## 2022-08-24 RX ADMIN — MYCOPHENOLATE MOFETIL 1500 MG: 500 TABLET, FILM COATED ORAL at 21:07

## 2022-08-24 RX ADMIN — SERTRALINE HYDROCHLORIDE 100 MG: 100 TABLET ORAL at 09:28

## 2022-08-24 ASSESSMENT — ACTIVITIES OF DAILY LIVING (ADL)
ADLS_ACUITY_SCORE: 26

## 2022-08-24 NOTE — PROGRESS NOTES
[facility]    Medicine Progress Note - Hospitalist Service    Date of Admission:  8/21/2022    Assessment & Plan        Dandy Sands is a 62 yo male with medical history of SLE, antiphospholipid antibody syndrome, PE on anticoagulation with warfarin, HFrEF due to ICM and HDL.  He initially presented to [facility] in [place] on 6/13/22 with nausea, vomiting and abd bloating.  Transferred to [facility] on 6/17/22 w/ decompensated heart failure, cardiogenic shock and multiorgan failure.  He  required IABP and then underwent HM3 LVAD placement on 7/8/22 by Dr Zamora.  Surgery was complicated with RV failure requiring 29F Protek duo via RIJ RVAD placement ( removed 7/21/22), hemopericardium requiring repeat washout, chest was closed 7/13/22.  Other post LVAD placement complication included epistaxis requiring intubation 8/7/22 for airway protection.  Pt removed his own nasal packing.  He was eventually extubated.  He was transferred to acute inpatient rehab 8/21/2022 for further rehab and cares      HFrEF 2/2 ICM s/p HM3 LVAD in setting of cardiogenic shock   RV failure s/p Protek duo temporary RVAD, s/p decannulation 7/21  RV thrombus  Post chest closure hemopericardium, s/p repeat washout (7/12)  H/o CAD s/p PCI to LAD (2005), S/p CRT-D (2006), NTEMI 2007  ---   Coronary angio 5/2022 showed severe ostial LAD disease with instent restenosis, mid LAD involved diagonal bifurcation, mild LCx disease and severe prox RCA disease   ---   Evaluated for heart transplant but due to elevated DSAs and decision was made to proceed with LVAD likely as destination therapy  ---   CABG was considered but decision was made for medical therapy in light of his low EF and cardiac MRI viability study showed nonviable myocardium in LAD territory    ---   Had low flow alram on 8/6/22, work up was negative, no clear etiology found   ---   CTA 8/20 normal flows   ---    "TAVARES 8/20 underfilling of LV and seed decreased 5300 -> 5200 and no alarms since  ---   Continue hydralazine 37.5 tid and lisinopril 2.5 mg daily for afterload reduction  ---   Continue ASA, digoxin and atorvastatin  ---   Pt to wear abd binder and compression stocking  ---   Goal MAP to be below 80   ---   On coumadin for RV thrombus w/ INR goal 2-3    ---   Needs EP evaluation for increasing capture threshold of RV lead   ---   LVAD coordinator # [id number] or [phone]    Acute hypoxic respiratory failure   ---   Intubated 8/7 for airway protection due to severe epistaxis  ---   Extubated 8/8 w/o any difficulties  ---   Has underlying mild-moderate Emphysema  ---   Seen by pulmonary consult service  ---   Continue Breao Ellipta, prn inhalers, PFT as able     ---   Iatrogenic apical PTX, resolved    ---   Respiratory failure resolved    PE  H/o antiphospholipid antibody syndrome  ---   Continue Coumadin, pharmacy is dosing  ---   INR is 2.47 today   ---   Factor X chromogenic assay was 24 %, low     Epistaxis  ---   Due to Coumadin + ASA  ---   has a history of nasal perforation and underwent elective procedure in [place]  ---   He required intubation for airway protection   ---   Seen by ENT consult service and underwent catheterization   ---   Patient removed his own nasal packing  ---   Extubated w/o any difficulties  ---   Epistaxis resolved  ---   Per ENT \" If bleeding recurs, spray Afrin (3 sprays) and hold firm digital pressure over the soft part of the nose for 20 minutes continuously.  Nasal clips are ineffective and should not be used in place of digital pressure.  If bleeding continues despite these measures, repeat. If bleeding continues or is severe please contact ENT\"     E.faecalis bacteremia  ---   Blood culture from PICC line (8/2/22), 1 out 2 bottles grew staph epi (probably contaminant) and the other bottle grew E. Faecalis.    ---   NGTD from subsequent blood cultures   ---   He received " Cefepime and Vancomycin, 8/7 - 8/12/22  ---   Treated for oral thrush for 14 days   ---   Has a h/o COVID-19 infection 1/2022 ( needed intubation )        Hematemesis  Black tarry stool   ---   He was seen by GI and was felt was due to epistaxis and not GI bleed   ---   on PPI   ---   Congestive hepatopathy, shocked liver on presentation, now LFT back to normal    ---   Dysphasia post intubation, resolved and now on regular diet     Extremity and facial numbness 8/22/22  ---   Had an focal neurologic exam  ---   CT head without contrast was negative for acute intracranial pathology  ---   Patient was completely asymptomatic when seen this morning, 8/23/22    Delirium   Depression  Insomnia  ---   CT head negative for acute intracranial pathology 8/7/22   ---   MS has been clear past couple days  ---   Continue sertraline   ---   Continue quetiapine 150 mg at night and 25 mg bid     ---   Continue melatonin       Acute blood loss anemia  superimposed on BRENDA  ---   Hgb stable at 10 g  ---   Not on Fe supplement     SLE  ---   Continue PTA hydroxychloroquine and cellcept 1500 mg po bid     Severe Malnutrition  ---   Based on nutrition assessment   ---   Will provide nutritional supplement            Diet: Room Service  Combination Diet Regular Diet; Thin Liquids (level 0)  Snacks/Supplements Adult: Ensure Enlive; With Meals    DVT Prophylaxis: warfarin   Fitzgerald Catheter: Not present  Central Lines: PRESENT       Cardiac Monitoring: None  Code Status: Full Code      Disposition Plan   TBD            Dave Gallardo MD  Hospitalist Service  [facility]  Securely message with the Vocera Web Console (learn more here)  Text page via [employer] Paging/Directory           ____________________________________________________________________    Interval History    No complaints this morning.  Uneventful night.  Doing well.    Data reviewed today: I reviewed all medications, new labs and  imaging results over the last 24 hours.  Physical Exam   Vital Signs: Temp: (!) 96.1  F (35.6  C) Temp src: Oral BP: 94/81 (with PT standing) Pulse: 52   Resp: 16 SpO2: 96 % O2 Device: None (Room air)    Weight: 131 lbs 9.6 oz  General: aao x 3, NAD.  HEENT:  NC/AT, neck supple  CVS:  NL s 1 and s2, no m/r/g. LVAD sound audible  Lungs:  Bibasilar rhonchi, no wheezing   Abd:  Soft, + bs, NT, no rebound or gaurding, no fluid shift.  Ext:  No c/c.  Lymph:  No edema.  Neuro:  Nonfocal.  Musculoskeletal: No calf tenderness to palpation.    Skin:  No rash.  Psychiatry:  Mood and affect appropriate.      Data   Recent Labs   Lab 08/24/22  0543 08/23/22  0635 08/22/22  0653 08/21/22  0510 08/20/22  0520   WBC  --   --  6.9 6.0 6.1   HGB  --   --  10.1* 9.5* 9.8*   MCV  --   --  94 96 95   PLT  --   --  272 243 262   INR 2.47* 2.57* 2.47* 2.44* 2.59*   NA  --   --  137 136 139   POTASSIUM  --   --  4.1 4.1 4.4   CHLORIDE  --   --  108 104 106   CO2  --   --  26 24 24   BUN  --   --  10 12.1 11.4   CR  --   --  0.58* 0.53* 0.60*   ANIONGAP  --   --  3 8 9   ELDA  --   --  9.5 9.1 9.4   GLC  --   --  97 90 88   ALBUMIN  --   --   --  3.2* 3.4*   PROTTOTAL  --   --   --  6.7 7.0   BILITOTAL  --   --   --  0.3 0.4   ALKPHOS  --   --   --  146* 142*   ALT  --   --   --  13 12   AST  --   --   --  24 25     No results found for this or any previous visit (from the past 24 hour(s)).

## 2022-08-24 NOTE — PLAN OF CARE
Goal Outcome Evaluation:    Overall Patient Progress: no change    Outcome Evaluation: Continues to have lower back pain. LVAD parameters fluctuating but within parameters. Alert and oriented x4, denies numbness, no neuro deficits. LVAD dressing changed this morning. PICC on RUE flushed and heplock for red lumen, purple lumen occluded, provider noted. Continues to be continent of bowel and bladder. Able to use call light appropriately and waited for assistance. MAP =86. Seen by LVAD coordinator this morning and have been followed by LVAD team for low flow. Had one time oxycodone at bedtime. Sleeping at end of shift.

## 2022-08-24 NOTE — PLAN OF CARE
FOCUS/GOAL  Medical management    ASSESSMENT, INTERVENTIONS AND CONTINUING PLAN FOR GOAL:  Pt is seen sleeping during shift change, awaken for his midnight hydralazine. MAP needs to be checked before administration. Pt complain of being awaken at nighttime and the difficulty of getting back to sleep. Prn seroquel for sleep aide. LVAD parameters w/in normal limits. MAP is 82 via doppler.  Use urinal at bed side for voiding. Pt is independent w/ bed mobility. Cont w/ POC.  Goal Outcome Evaluation:    Plan of Care Reviewed With: patient          Outcome Evaluation: Continue w/ current cares and treatments

## 2022-08-24 NOTE — PLAN OF CARE
Individualized Overall Plan Of Care (IOPOC)      Rehab diagnosis/Impairment Group Code: 09 cardiac; s/p hm3 lvad  Lvad (left ventricular assist device) present (h)       Expected functional outcome:  Mod I with transfers, bed mobility, gait and ADL's. Supervision assist with  IADL's and stairs.     Clinical Impression Comments: 61 year old male with PMH of lupus, antiphospholipid syndrome, HTN, HLD, HFrEF 2/2 ICM who presented with cardiogenic shock c/b multiorgan failure (JOSE, shock liver) requiring mechanical support with IABP, now s/p HM3 LVAD 7/8/22 by Dr. Zamora with intraoperative course c/b RV failure s/p 29F Protek duo via RIJ. Post op course c/b hemopericardium s/p repeat washout with chest left open. Chest closed 7/13/22 and decannulated from RVAD 7/21. Developed epistaxis on 8/6 night which was packed by ENT. Patient has h/o nasal perforation that required elective procedure (in [place]) which was postponed due to LVAD insertion Unfortunately, bleeding persisted and there was concern for airway protection on 8/7 AM which prompted intubation and transfer to ICU. Patient has since been extubated (8/8) and transferred to the floor. Nasal packing removed 8/9.    Mobility: Pt would beneit from IP PT to progress strength, balance and activity tolerance to ensure safety with d/c home.    ADL: Pt is below baseline  due tp post op precuations, pain, dizziness, strength and will benefit from skilled OT to improve IND.    Communication/Cognition/Swallow:       Intensity of therapy:   PT 90 minutes, Daily, for 14 DAYS  OT 90 minutes, Daily, for 14 DAYS    Orthotics nONE  Education  manage medication education, positioning, carryover of new rehab techniques, care coordination, skin integrity, pain management, provide safe environment for patient at falls risk, monitor nutritional intake and LVAD education  Neuropsychology Testing: No  Other:  None      Medical Prognosis: fair      Physician summary statement:  In  addition to above statements address, Patient requires intensive active and ongoing therapeutic intervention and multiple therapies; Patient requires medical supervision; Expected to actively participate in the intensive rehab program; Sufficiently stable to actively participate; Expectation for measurable improvement in functional capacity or adaption to impairments.    Discharge destination: prior home  Discharge rehabilitation needs: home care      Estimated length of stay: 14 days      Rehabilitation Physician Abner Mathew MD

## 2022-08-24 NOTE — PLAN OF CARE
Alert and oriented x 4, denies headache or dizziness, voiding spontaneously in the urinal and staff empties it, LVad Parameters WDL, MAP 84. Lvad coordinator completed dressing change today duting her visit with patient and family, will continue poc      Goal Outcome Evaluation:

## 2022-08-24 NOTE — PROGRESS NOTES
[facility]   Acute Rehabilitation Unit  Daily progress note    INTERVAL HISTORY  Dandy Sands was seen and examined at bedside.  He notes he has been doing better today and no acute events overnight per RN chart check.  Discussed with him on use of Ellipta and lidocaine patches which he does not like, Randolph Ellipta Breo causes coughing and has not noted improvements.  Will plan to discuss with hospitalist on options prior to removing medication from list and he was agreeable.  He denies dizziness, fever, chills, CP, SOB, Abdominal discomfort, or new W/N/T. LBM 8/23.    10 point ROS is negative except what is in HPI.    Functionally   OT  ADLs: Is CGA w/ walker for short distance for mobility, requires set up and close SBA standing for grooming, set up for UB dressing, CGA for LB dressing- pt states he prefers LB dsg in bed and is able to complete with set up on 8/24, assist for donning LVAD holster, assist for lucero hose. Bathing: no showers, UB sponge bath set up assist and close SBA standing, pt declined LB washing.Toileting: CGA for transfer and toileting  IADLs: son can assist  Vision/Cognition: wears glasses, cognition to be assessed as needed     Today's Updates  - Discussed with hospitalist on Ellipta Breo and hydralazine    MEDICATIONS    acetaminophen  975 mg Oral Q8H     aspirin  81 mg Oral Daily     atorvastatin  40 mg Oral QPM     digoxin  125 mcg Oral Daily     fluticasone-vilanterol  1 puff Inhalation Daily     heparin lock flush  5-20 mL Intracatheter Q24H     hydrALAZINE  37.5 mg Oral Q8H     hydroxychloroquine  200 mg Oral BID     Lidocaine  1 patch Transdermal Q24H     lidocaine   Transdermal Q8H CAMMY     lisinopril  2.5 mg Oral Daily     melatonin  10 mg Oral At Bedtime     multivitamin w/minerals  1 tablet Oral Daily     mycophenolate  1,500 mg Oral BID     pantoprazole  40 mg Oral QAM AC     QUEtiapine  150 mg Oral or Feeding Tube At Bedtime     QUEtiapine  25  "mg Oral BID     sertraline  100 mg Oral Daily     sodium chloride  2 spray Both Nostrils Q4H While awake     sodium chloride (PF)  10-40 mL Intracatheter Q8H     warfarin ANTICOAGULANT  6 mg Oral ONCE at 18:00     Warfarin Therapy Reminder  1 each Oral See Admin Instructions        heparin lock flush, LORazepam, naloxone **OR** naloxone **OR** naloxone **OR** naloxone, ondansetron, oxyCODONE, polyethylene glycol, QUEtiapine, senna-docusate, sodium chloride (PF)     PHYSICAL EXAM  BP 94/81 (BP Location: Left arm)   Pulse 67   Temp (!) 95.8  F (35.4  C) (Axillary)   Resp 16   Ht 1.702 m (5' 7\")   Wt 62.1 kg (136 lb 12.8 oz)   SpO2 97%   BMI 21.43 kg/m    General: Up in bed seated, not in acute distress  HEENT: NC/NT, Moist mucous membranes  Pulmonary: Clear bilateral airways on auscultation.  Cardiovascular: Notable LVAD running and  humming.  Abdominal: Non-tender, non-distended, bowel sounds present.  Extremities: warm, well perfused, no edema in bilateral lower extremities, in compression socks, no tenderness in calves, unchanged power 5/5 bilateral upper and lower extremities.  Neuro/MSK: Alert and oriented, face symmetric.    LABS  Recent Results (from the past 24 hour(s))   INR    Collection Time: 08/24/22  5:43 AM   Result Value Ref Range    INR 2.47 (H) 0.85 - 1.15       Rehabilitation - continue comprehensive acute inpatient rehabilitation program with multidisciplinary approach including therapies, rehab nursing, and physiatry following. See interval history for updates.      ASSESSMENT AND PLAN  Dandy Sands is a 61 year old right hand dominant male with past medical history of SLE, antiphospholipid syndrome, history pulmonary embolism (on anticoagulation with warfarin), HFrEF due to IMC, HDL. He initially presented to [facility] on 6/13 due to nausea, vomiting, abdominal bloating and transferred to [facility] for admission on 6/17/2022 for decompensated heart failure 2/2 cardiogenic shock c/b " multiorgan failure requiring IABP, is s/p HM 3 LVAD on 7/8/2022. Stay was also c/b RV, had 29F Protek duo via RIJ, RVAD removed 7/21, hemopericardium requiring repeat washout, chest was closed 7/13. Other complication epistaxis on 8/6 ENT. Patient has h/o nasal perforation that required elective procedure (in [place]) which was postponed due to LVAD insertion.  He required intubation 8/7 for airway protection, was extubated 8/8.  Nasal pack removed 8/9.     Admission to acute inpatient rehab: Cardiac; s/p HM3 LVAD.    Impairment group code: 09        1. PT, OT 90 minutes of each on a daily basis, in addition to rehab nursing and close management of physiatrist.       2. Impairment of ADL's: Impairment in strength, endurance, and ROM resulting in functional limitations in patient's ability to perform ADLs.     3. Impairment of mobility:  Impairment in strength, endurance, and ROM resulting in functional limitations in patient's ability to perform functional mobility.     4. Impairment of cognition/language/swallow:  N/A     5. Medical Conditions     #HFrEF 2/2 ICM s/p HM3 LVAD in setting of cardiogenic shock (SCAI D)  #RV failure s/p Protek duo temporary RVAD s/p decannulation 7/21  #RV thrombus  #Post chest closure hemopericardium s/p repeat washout (7/12)  #PMH CAD s/p PCI to LAD (2005)  #S/p CRT-D (2006)  Patient with ICM s/p HM3 LVAD 7/8/22 2/2 cardiogenic shock requiring MCS with IABP as prior to HM (initially evaluated for heart transplant, however noted to have substantially elevated DSAs during course of care, so decision made to proceed with LVAD given patient was already on temporary MCS). Intraoperative course during LVAD placement was c/b RVF resulting in cardiogenic shock requiring high dose pressors necessitating temporary RVAD support. Initial post-op course c/b hemopericardium requiring repeat washout with chest left open, with good recovery extubation and decannulation on 7/21. Patient tolerating  hemodynamics and end organ standpoint well. He has had intermittent low flow alarms with high PI, no power spike, and a closed aortic valve on TTE. TTE also showed underfilling LV which could explain his low flow alarms d/t suckdown event so his LVAD speed was reduced to 5200. Patient did have a low flow alarm on 8/20 @ 1145 (PI 8-9 and not hypotensive); CTA chest done which showed that the LVAD outflow tract is widely patent. Patient is euvolemic on exam, but at times has had orthostasis symptoms; patient encouraged to stay hydrated within his fluid/diet restriction guidelines (no more than 2L/day of fluid intake and no more than 3g of Na per day in dietary intake).  P:  LVAD:  - interrogation showed 1 low flow alarm in last 24 hours as described above  - Is euvolemic 8/21  - Diuretics: Not needed at this time; monitor volume status and weight and consider starting low dose Lasix if he gains weight or starts showing signs of hypervolemia  - Afterload reduction: MAP goal 65-80; Hydralazine 37.5 TID + Lisinopril 2.5 qday  - Statin: Atorvastatin 40mg  - BB: Holding in setting of recent cardiogenic shock and orthostatic symptoms, consider restarting outpatient  - AA: Holding in setting of recent cardiogenic shock and orthostatic symptoms, consider restarting outpatient  - SGLTi: As outpatient, has been held due to immunosuppression he is on for his SLE.  - Warfarin for INR goal 2-3  - ASA 81 mg qday     #Epistaxis, resolved  #Intubated due to Concern for airway protection (8/7)-Extubated (8/8)  #Mild-moderate emphysema  #History of COVID-19  #Iatrogenic R apical PTX  H/o nasal perforation  For elective procedure (in [place]), postponed 2/2 LVAD insertion. Epistaxis not fixed by packing overnight on 8/6 and pt continued to bleed. Intubated for airway protection. Controlled at bedside by ENT s/p cautery and packing. Extubated 8/8. Had Cefepime/Vanc empirically for broad coverage for PNA (8/7-8/12). Patient removed  his own nasal packing overnight on 8/9. ENT no longer following.    - C/t Ayr nasal saline gel at bedtime + nasal saline q4H while awake 8/24  -  followup with his ENT as OP (see below)     #Insomnia/Delirium, resolved  Some concern that he had a fall overnight on 8/6. Discussed with nursing staff and he did NOT have a fall, although he did try to get out of bed. CT head was obtained (8/7) and it was unremarkable. Patient had some issues with delirium and insomnia during hospitalization and psychiatry was consulted who recommended  - Sertraline and Quetiapine per psych recs     # ? Orthostatic  Noted to be dizzy with therapy.  Has occasional hypotension with SBP high 80s.  May benefit from compressions.  -Compression socks and, abdominal binder 8/22    #GERD  #Congestive hepatopathy in the setting of cardiac insuffiency - Resolved  #Hematemesis / oral mucosal bleeding -resolved    #Dysphagia  GI consulted 7/31 for black tarry stools and a possible GI bleed, however it is more likely swallowed blood from epistaxis that patient previously had. GI did not recommend an upper endoscopy. Recurrence of bloody stools likely due to epistaxis which have resolved. Hemoglobin was monitored and continued to be stable throughout the rest of the hospitalization. Cleared for regular diet by SLP.     #Anemia due to blood loss from Epistaxis-Resolved  #History of DVT and PE   #Antiphospholipid antibody syndrome  #History of iron deficiency anemia  As noted above, likely related to epistaxis. After above interventions were complete and management was initiated per ENT recs, hemoglobin remained stable and no further evidence of bleeding.  - Saline (OCEAN) nasal sprays Q4H when awake.     #SLE  - PTA meds: hydroxychloroquine 200mg, resumed 7/16  - Restart PTA Cellcept at discharge     #Oral thrush, resolved  Completed 14 days of nystatin        6. Adjustment to disability:  Clinical psychology to eval and treat as needed  7. FEN:   Regular with thins  8. Bowel: As needed MiraLAX and Senokot  9. Bladder: Pending review  10. DVT Prophylaxis: Warfarin goal 2-3 for LVAD, daily INR, and ASA 81  11. GI Prophylaxis: Pantoprazole 40 mg daily  12. Code: Full  13. Disposition: Home with homecare and Home with outpatient therapy  14. ELOS: 12 to 14 days.  15. Rehab prognosis: Fair   16. Follow up Appointments on Discharge:   -PCP follow-up 2 to 3 weeks : Requires CBC BMP  -Follow-up with [facility] at next available appointment and follow-up with your cardiologist in the next few months at next available appointment   - Follow-up with psychiatry post discharge  - Followup with his ENT in [place] to rediscuss possible procedure for his nasal perforation vs. conservative management       Patient seen and discussed with Dr. Mathew, PM&R Staff Physician    Padmini Linda MD   Resident Physician, PGY-2   Physical Medicine and Rehabilitation  [facility]

## 2022-08-24 NOTE — PLAN OF CARE
"Discharge Planner Post-Acute Rehab OT:     Discharge Plan: to [facility] with son     Precautions: fall, strernal precautions, LVAD, lucero hose and abdominal binder to be worn due to low BP    Current Status:  ADLs:    Mobility: CGA with walker for short distance    Grooming: setup, close SBA standing    Dressing: setup for UB dressing, CGA for LB dressing- pt states he prefers LB dsg in bed and is able to complete with set up on 8/24, assist for donning LVAD holster, assist for lucero hose    Bathing: no showers, UB sponge bath set up assist and close SBA standing, pt declined LB washing    Toileting: CGA for transfer and toileting  IADLs: son can assist  Vision/Cognition: wears glasses, cognition to be assessed as needed    Assessment: pt able to tolerate ADLs w/fww ambulating to/from bathroom and standing at sink for light face level g/h with SBA-CGA, and encouraged seated rest breaks as needed. Does endorse \"slight dizziness\" at times but resolves. Pt declined to change LVAD to battery this am d/t not wanting to wear holster just yet. Pt expresses switching to battery is \"going fine\".      Other Barriers to Discharge (DME, Family Training, etc): dizziness                             "

## 2022-08-25 ENCOUNTER — APPOINTMENT (OUTPATIENT)
Dept: OCCUPATIONAL THERAPY | Facility: CLINIC | Age: 62
DRG: 949 | End: 2022-08-25
Attending: PHYSICAL MEDICINE & REHABILITATION
Payer: COMMERCIAL

## 2022-08-25 ENCOUNTER — APPOINTMENT (OUTPATIENT)
Dept: PHYSICAL THERAPY | Facility: CLINIC | Age: 62
DRG: 949 | End: 2022-08-25
Attending: PHYSICAL MEDICINE & REHABILITATION
Payer: COMMERCIAL

## 2022-08-25 LAB — INR PPP: 2.29 (ref 0.85–1.15)

## 2022-08-25 PROCEDURE — 36592 COLLECT BLOOD FROM PICC: CPT

## 2022-08-25 PROCEDURE — 99207 PR PB CHARGE NOT FOUND: CPT | Performed by: PHYSICIAN ASSISTANT

## 2022-08-25 PROCEDURE — 99233 SBSQ HOSP IP/OBS HIGH 50: CPT | Performed by: INTERNAL MEDICINE

## 2022-08-25 PROCEDURE — 93750 INTERROGATION VAD IN PERSON: CPT | Performed by: PHYSICIAN ASSISTANT

## 2022-08-25 PROCEDURE — 250N000013 HC RX MED GY IP 250 OP 250 PS 637: Performed by: INTERNAL MEDICINE

## 2022-08-25 PROCEDURE — 999N000150 HC STATISTIC PT MED CONFERENCE < 30 MIN: Performed by: STUDENT IN AN ORGANIZED HEALTH CARE EDUCATION/TRAINING PROGRAM

## 2022-08-25 PROCEDURE — 250N000013 HC RX MED GY IP 250 OP 250 PS 637: Performed by: PHYSICAL MEDICINE & REHABILITATION

## 2022-08-25 PROCEDURE — 97530 THERAPEUTIC ACTIVITIES: CPT | Mod: GP

## 2022-08-25 PROCEDURE — 97110 THERAPEUTIC EXERCISES: CPT | Mod: GP | Performed by: PHYSICAL THERAPIST

## 2022-08-25 PROCEDURE — 97110 THERAPEUTIC EXERCISES: CPT | Mod: GO

## 2022-08-25 PROCEDURE — 128N000003 HC R&B REHAB

## 2022-08-25 PROCEDURE — 97110 THERAPEUTIC EXERCISES: CPT | Mod: GP

## 2022-08-25 PROCEDURE — 250N000012 HC RX MED GY IP 250 OP 636 PS 637

## 2022-08-25 PROCEDURE — 85610 PROTHROMBIN TIME: CPT

## 2022-08-25 PROCEDURE — 99233 SBSQ HOSP IP/OBS HIGH 50: CPT | Mod: GC | Performed by: PHYSICAL MEDICINE & REHABILITATION

## 2022-08-25 PROCEDURE — 999N000125 HC STATISTIC PATIENT MED CONFERENCE < 30 MIN: Performed by: STUDENT IN AN ORGANIZED HEALTH CARE EDUCATION/TRAINING PROGRAM

## 2022-08-25 PROCEDURE — 250N000013 HC RX MED GY IP 250 OP 250 PS 637

## 2022-08-25 RX ORDER — WARFARIN SODIUM 3 MG/1
6 TABLET ORAL
Status: COMPLETED | OUTPATIENT
Start: 2022-08-25 | End: 2022-08-25

## 2022-08-25 RX ADMIN — Medication 10 MG: at 20:57

## 2022-08-25 RX ADMIN — MYCOPHENOLATE MOFETIL 1500 MG: 500 TABLET, FILM COATED ORAL at 20:56

## 2022-08-25 RX ADMIN — Medication 37.5 MG: at 14:50

## 2022-08-25 RX ADMIN — HYDROXYCHLOROQUINE SULFATE 200 MG: 200 TABLET, FILM COATED ORAL at 20:57

## 2022-08-25 RX ADMIN — QUETIAPINE FUMARATE 150 MG: 50 TABLET ORAL at 20:56

## 2022-08-25 RX ADMIN — OXYCODONE HYDROCHLORIDE 5 MG: 5 TABLET ORAL at 21:01

## 2022-08-25 RX ADMIN — HYDROXYCHLOROQUINE SULFATE 200 MG: 200 TABLET, FILM COATED ORAL at 10:09

## 2022-08-25 RX ADMIN — Medication 37.5 MG: at 06:24

## 2022-08-25 RX ADMIN — QUETIAPINE FUMARATE 25 MG: 25 TABLET ORAL at 10:09

## 2022-08-25 RX ADMIN — LISINOPRIL 2.5 MG: 2.5 TABLET ORAL at 10:08

## 2022-08-25 RX ADMIN — WARFARIN SODIUM 6 MG: 3 TABLET ORAL at 17:10

## 2022-08-25 RX ADMIN — SALINE NASAL SPRAY 2 SPRAY: 1.5 SOLUTION NASAL at 20:58

## 2022-08-25 RX ADMIN — ACETAMINOPHEN 975 MG: 325 TABLET, FILM COATED ORAL at 13:14

## 2022-08-25 RX ADMIN — SALINE NASAL SPRAY 2 SPRAY: 1.5 SOLUTION NASAL at 17:10

## 2022-08-25 RX ADMIN — QUETIAPINE FUMARATE 25 MG: 25 TABLET ORAL at 16:09

## 2022-08-25 RX ADMIN — SERTRALINE HYDROCHLORIDE 100 MG: 100 TABLET ORAL at 10:08

## 2022-08-25 RX ADMIN — MULTIPLE VITAMINS W/ MINERALS TAB 1 TABLET: TAB at 10:08

## 2022-08-25 RX ADMIN — SALINE NASAL SPRAY 2 SPRAY: 1.5 SOLUTION NASAL at 10:13

## 2022-08-25 RX ADMIN — ASPIRIN 81 MG: 81 TABLET, CHEWABLE ORAL at 10:09

## 2022-08-25 RX ADMIN — MYCOPHENOLATE MOFETIL 1500 MG: 500 TABLET, FILM COATED ORAL at 10:08

## 2022-08-25 RX ADMIN — DIGOXIN 125 MCG: 125 TABLET ORAL at 10:09

## 2022-08-25 RX ADMIN — SALINE NASAL SPRAY 2 SPRAY: 1.5 SOLUTION NASAL at 13:14

## 2022-08-25 RX ADMIN — ACETAMINOPHEN 975 MG: 325 TABLET, FILM COATED ORAL at 20:57

## 2022-08-25 RX ADMIN — Medication 37.5 MG: at 20:57

## 2022-08-25 RX ADMIN — ATORVASTATIN CALCIUM 40 MG: 40 TABLET, FILM COATED ORAL at 20:57

## 2022-08-25 RX ADMIN — PANTOPRAZOLE SODIUM 40 MG: 40 TABLET, DELAYED RELEASE ORAL at 10:09

## 2022-08-25 ASSESSMENT — ACTIVITIES OF DAILY LIVING (ADL)
ADLS_ACUITY_SCORE: 26
ADLS_ACUITY_SCORE: 31
ADLS_ACUITY_SCORE: 26
ADLS_ACUITY_SCORE: 31
ADLS_ACUITY_SCORE: 26
ADLS_ACUITY_SCORE: 31
ADLS_ACUITY_SCORE: 31
ADLS_ACUITY_SCORE: 26
ADLS_ACUITY_SCORE: 26

## 2022-08-25 NOTE — PROGRESS NOTES
All VAD education is complete.   Both patient and son, Amos, and daughter in law, Tanja, verbalized understanding of and/or correctly demonstrated the ability to:   -Switch power sources  -Change controller. They both demonstrate controller change properly and verbalized understanding that patient should only change controller after discussion with VAD Coordinator and in the presence of a trained caregiver whenever possible.   -Identify controller and states function, power sources, and Battery charger function, alarms, and alarms management.   -Perform Self test on controller   -State VAD precautions and warnings (including but not limited to: travel bags within arms reach at all time, using AC power while sleeping, avoid total immersion in water, boating, MRIs, metal detectors, contact sports, vacuuming or activity that might create static electricity).   -Identify an emergency and state the correct action in the event of an emergency.   -Recognize situations that require paging VAD coordinator and demonstrate proper paging technique.   -Determine procedures that necessitate prophylactic antibiotics.   -Warfarin is managed by [facility] anticoagulation clinic. Pt understands that (s)he should never stop or change coumadin dose unless directed to do so by either VAD team or [facility] anticoagulation.  -Verbalized understanding of VAD parameters, normal limits, and actions required when outside of range.    -Tanja demonstrated removing the old dressing, cleaning the exit site, and applying new dressing using sterile technique. Understands need for DL immobilization and signs/symptoms of infection.  -Patient and Andres passed written test.  -Patient confirms having working  3-pronged grounded outlets at home.  -Critical life-sustaining medical equipment letter faxed to [employer].  -VAD information packet faxed to pt's identified EMS service, primary care provider, and local cardiologist (if  applicable).  -Referral sent to [facility] medication monitoring clinic for warfaring management and dosing. Pt will have labs drawn at [facility] as outpatient  -Driveline exit site supplies will be ordered from [employer]  Follow-up appointments scheduled on 9/1 with CVTS and on 9/1 with RUI Dumont    Patient's daughter, Asha, has participated in virtual education. She will not be signed off on VAD education until she completes hands on education with dressing change and controller change. The VAD test has been emailed to her. She will send back once complete.

## 2022-08-25 NOTE — PLAN OF CARE
Acute Rehab Care Conference/Team Rounds      Type: Team Rounds    Present: Dr. Abner Mathew, Padmini Linda Resident MD, Antonietta Medina PT, Erica Dennison OT, Lisa GOINS, Chantal Rojo Patient Care Supervisor, Virginia Schroeder RD, Eddie Altamirano RN, and Dandy Gambino Patient.     Discharge Barriers/Treatment/Education    Rehab Diagnosis: 09 Cardiac; s/p HM3 LVAD     Active Medical Co-morbidities/Prognosis:   Patient Active Problem List   Diagnosis     Decompensated heart failure (H)     Cardiogenic shock (H)     LVAD (left ventricular assist device) present (H)     Chronic systolic congestive heart failure (H)     Heart failure with reduced ejection fraction, NYHA class III (H)        Safety: Pt is alert and oriented.  W/ LVAD, heart mate 3.  Use call light for needs. CGA1 gb and walker w/ ambulation. Abdominal binder and teds when up.    Pain: Complain of pain on right shoulder and back pain. Pt has schedule tylenol and prn oxycodone.    Medications, Skin, Tubes/Lines: Can take pills whole. Incision to chest, JOSE ENRIQUE. Has LVAD drive line.  PICC line to rt forearm, red lumen is patent. Purple line is occluded.  Swallowing/Nutrition:    Bowel/Bladder: Continent of B/B. LBM 8/24/22.    Psychosocial: [place] Planning to stay local with family at [address] at discharge from ARU. Indep PTA. Some non-compliance and irritability while on ARU. Good support from family, adult son and dtr. Works as a . Applied for SSDI. .     ADLs/IADLs: Pt is CGA for amb in the room with the walker and SBA/CGA for ADL. Pt has been limited by fatigue and some dizziness needs cues for walker and LVAD cord management. Will be staying at [place] with son and thus has good support. Need to progress to mod I when able.     Mobility: CGA with FWW up to 85'. Pt continues to be limited by weakness and fatigue but is showing improvement in tolerance. Continue to progress pt's endurance with OOB  activity toward MOD I goals. Anticipate ready for discharge by 8/31, follow up with home PT.    Cognition/Language:    Community Re-Entry: not yet ready, may need transport wc vs 4WW    Transportation: will need assist, family training for transfer    Decision maker: self    Plan of Care and goals reviewed and updated.    Discharge Plan/Recommendations    Fall Precautions: continue    Patient/Family input to goals: Yes    Anticipated rehab needs following discharge: Home, with home care at [place].    Anticipated care giver support after discharge: Family    Estimated length of stay: 8/31/22    Overall plan for the patient: Continue IP Rehabilitation.       Utilization Review and Continued Stay Justification    Medical Necessity Criteria:    For any criteria that is not met, please document reason and plan for discharge, transfer, or modification of plan of care to address.    Requires intensive rehabilitation program to treat functional deficits?: Yes    Requires 3x per week or greater involvement of rehabilitation physician to oversee rehabilitation program?: Yes    Requires rehabilitation nursing interventions?: Yes    Patient is making functional progress?: Yes    There is a potential for additional functional progress? Yes    Patient is participating in therapy 3 hours per day a minimum of 5 days per week or 15 hours per week in 7 day period?:Yes    Has discharge needs that require coordinated discharge planning approach?:Yes      Final Physician Sign off    Statement of Approval: I approve the plan of care.     Patient Goals  Social Work Goals: Discharge next week with HC vs OP CR. Transplant team aware. Patient Care Supervisor and RN CC assisting with discharge plans. No immediate SW needs.     Therapy Frequency (OT): Daily  OT: Hygiene/Grooming: modified independent  OT: Upper Body Dressing: Modified independent  OT: Lower Body Dressing: Modified independent  OT: Upper Body Bathing: Modified independent  OT:  Lower Body Bathing: Modified independent  OT: Toilet Transfer/Toileting: Modified independent, cleaning and garment management, toilet transfer  OT: Meal Preparation: Modified independent, with simple meal preparation  OT: Goal 1: Pt will be  MOD I with UB exercises program to build strength and endurance for ADL, within sternal precautions (Pt will be  MOD I with UB exercises program to build strength and endurance for ADL, within sternal precautions.)    PT Predicted Duration/Target Date for Goal Attainment: 08/31/22  PT Frequency: Daily  PT: Bed Mobility: Independent  PT: Transfers: Independent, Within precautions  PT: Gait: Independent, Greater than 200 feet  PT: Stairs: Independent, 2 stairs  PT: Perform aerobic activity with stable cardiovascular response: continuous activity, 20 minutes  PT: Goal 1: Pt will be able to complete car transfer with SBA in order to ensure safety with d/c home.                                                                                                                        Goal: Falls: Pt will be knowledgeable about the use of call light for assistance evidenced by absence of fall  Individualized Goal 1: Pt will be educated about the need for turning and repositioning to prevent pressure ulcer evidenced by intact skin integrity  Individualized Goal 2: Pt will partcipate in map to manage medication independently by 8/28/22 evidenced by passing map.  Individualized Goal 3: Pt will be educated about sternal precaution evidenced by the use of heart  pillow for all transfers.     Goal Outcome Evaluation:    Plan of Care Reviewed With: patient     Overall Patient Progress: no change    Outcome Evaluation: Continue w/ current cares and treatments

## 2022-08-25 NOTE — PLAN OF CARE
FOCUS/GOAL  Medical management    ASSESSMENT, INTERVENTIONS AND CONTINUING PLAN FOR GOAL:  Pt is alert and oriented. Does not use call light at night. He does not want to be bother w/ his sleep. Use urinal for voiding at bed side. Change position independently. Requested prn sleep aide after he took his hydralazine tonight. LVAD parameters w/in normal level. PICC line is intact, purple is occluded and red line is patent. Seen sleeping in between safety round checks.  Goal Outcome Evaluation:    Plan of Care Reviewed With: patient     Overall Patient Progress: no change

## 2022-08-25 NOTE — PLAN OF CARE
Goal Outcome Evaluation:    Overall Patient Progress: no change    Outcome Evaluation: Pt still with lower back pain, scheduled Tylenol given and PRN oxycodone given once, interventions effective. LVAD parameters met, MAP=78. Continues to be continent of bowel and bladder, had 2 bm this shift. Sitting at EOB at dinner time and 2x more. Using call lgiht and mostly waited for assistance but with one time impulsivity, getting up from toilet seat/bed before assistance came. Reminded to wait for assistance.

## 2022-08-25 NOTE — PLAN OF CARE
Discharge Planner Post-Acute Rehab PT:      Discharge Plan: [facility] with son. Home care PT, pending insurance.     Precautions: Sternal, falls     Current Status:  Bed Mobility: IND  Transfer: SBA with FWW  Gait: up to 200 ft, SBA with FWW  Stairs: 4 stairs (6''), CGA  Balance: Fair dynamic standing balance.     Assessment: Continued focus on functional strength and standing tolerance/endurance. Requires seated rest breaks t/o, limited by LE fatigue as well as mild SOB, but with LVAD parameters WNL.     Other Barriers to Discharge (DME, Family Training, etc):   Family training to be scheduled.  Anticipate 4WW

## 2022-08-25 NOTE — PLAN OF CARE
"Discharge Planner Post-Acute Rehab OT:     Discharge Plan: to [facility] with son     Precautions: fall, strernal precautions, LVAD, lucero hose and abdominal binder to be worn due to low BP    Current Status:  ADLs:    Mobility: CGA with walker for short distance    Grooming: setup, close SBA standing    Dressing: setup for UB dressing, CGA for LB dressing- pt states he prefers LB dsg in bed and is able to complete with set up on 8/24, assist for donning LVAD holster, assist for lucero hose    Bathing: no showers, UB sponge bath set up assist and close SBA standing, pt declined LB washing    Toileting: CGA for transfer and toileting  IADLs: son can assist  Vision/Cognition: wears glasses, cognition to be assessed as needed    Assessment:  Pt declined wearing abdominal binder dt pressure on incision. Earlier in the day he was able to tolerate ambulating w/FWW to mat at end at hallway [facility]. Participated in bag toss activity, reported being limited by \"tired legs\". Pt too fatigued to participate in afternoon treatment session. Continue with focus on increasing activity tolerance and encourage seated rest breaks as needed.     Other Barriers to Discharge (DME, Family Training, etc): dizziness       "

## 2022-08-25 NOTE — PROGRESS NOTES
[facility]    Medicine Progress Note - Hospitalist Service    Date of Admission:  8/21/2022    Assessment & Plan        Dandy Sands is a 62 yo male with medical history of SLE, antiphospholipid antibody syndrome, PE on anticoagulation with warfarin, HFrEF due to ICM and HDL.  He initially presented to [facility] in [place] on 6/13/22 with nausea, vomiting and abd bloating.  Transferred to [facility] on 6/17/22 w/ decompensated heart failure, cardiogenic shock and multiorgan failure.  He  required IABP and then underwent HM3 LVAD placement on 7/8/22 by Dr Zamora.  Surgery was complicated with RV failure requiring 29F Protek duo via RIJ RVAD placement ( removed 7/21/22), hemopericardium requiring repeat washout, chest was closed 7/13/22.  Other post LVAD placement complication included epistaxis requiring intubation 8/7/22 for airway protection.  Pt removed his own nasal packing.  He was eventually extubated.  He was transferred to acute inpatient rehab 8/21/2022 for further rehab and cares       HFrEF 2/2 ICM s/p HM3 LVAD in setting of cardiogenic shock   RV failure s/p Protek duo temporary RVAD, s/p decannulation 7/21  RV thrombus  Post chest closure hemopericardium, s/p repeat washout (7/12)  H/o CAD s/p PCI to LAD (2005), S/p CRT-D (2006), NTEMI 2007  ---   Coronary angio 5/2022 showed severe ostial LAD disease with instent restenosis, mid LAD involved diagonal bifurcation, mild LCx disease and severe prox RCA disease   ---   Evaluated for heart transplant but due to elevated DSAs and decision was made to proceed with LVAD likely as destination therapy  ---   CABG was considered but decision was made for medical therapy in light of his low EF and cardiac MRI viability study showed nonviable myocardium in LAD territory    ---   Had low flow alram on 8/6/22, work up was negative, no clear etiology found   ---   CTA 8/20 normal flows   ---    "TAVARES 8/20 underfilling of LV and seed decreased 5300 -> 5200 and no alarms since  ---   Continue hydralazine 37.5 tid and lisinopril 2.5 mg daily for afterload reduction  ---   Continue ASA, digoxin and atorvastatin  ---   Pt to wear abd binder and compression stocking  ---   Goal MAP to be below 80   ---   On coumadin for RV thrombus w/ INR goal 2-3    ---   Needs EP evaluation for increasing capture threshold of RV lead   ---   LVAD coordinator # [phone] or [phone]    Acute hypoxic respiratory failure   ---   Intubated 8/7 for airway protection due to severe epistaxis  ---   Extubated 8/8 w/o any difficulties  ---   Has underlying mild-moderate Emphysema  ---   Seen by pulmonary consult service  ---   Continue Breao Ellipta, prn inhalers, PFT as able     ---   Iatrogenic apical PTX, resolved    ---   Respiratory failure resolved    PE  H/o antiphospholipid antibody syndrome  ---   Continue Coumadin, pharmacy is dosing  ---   INR is 2.29 today     Epistaxis  ---   Due to Coumadin + ASA  ---   has a history of nasal perforation and underwent elective procedure in [place]  ---   He required intubation for airway protection   ---   Seen by ENT consult service and underwent catheterization   ---   Patient removed his own nasal packing  ---   Extubated w/o any difficulties  ---   Epistaxis resolved  ---   Per ENT \" If bleeding recurs, spray Afrin (3 sprays) and hold firm digital pressure over the soft part of the nose for 20 minutes continuously.  Nasal clips are ineffective and should not be used in place of digital pressure.  If bleeding continues despite these measures, repeat. If bleeding continues or is severe please contact ENT\"     E.faecalis bacteremia  ---   Blood culture from PICC line (8/2/22), 1 out 2 bottles grew staph epi (probably contaminant) and the other bottle grew E. Faecalis.    ---   NGTD from subsequent blood cultures   ---   He received Cefepime and Vancomycin, 8/7 - 8/12/22  ---   Treated " for oral thrush for 14 days   ---   Has a h/o COVID-19 infection 1/2022 ( needed intubation )        Hematemesis  Black tarry stool   ---   He was seen by GI and was felt was due to epistaxis and not GI bleed   ---   on PPI   ---   Congestive hepatopathy, shocked liver on presentation, now LFT back to normal    ---   Dysphasia post intubation, resolved and now on regular diet     Extremity and facial numbness 8/22/22  ---   Had a non-focal neurologic exam  ---   CT head without contrast was negative for acute intracranial pathology  ---   Resolved    Delirium   Depression  Insomnia  ---   CT head negative for acute intracranial pathology 8/7/22   ---   MS has been clear past couple days  ---   Continue sertraline   ---   Continue quetiapine 150 mg at night and 25 mg bid     ---   Continue melatonin       Acute blood loss anemia  superimposed on BRENDA  ---   Hgb stable at 10 g  ---   Not on Fe supplement     SLE  ---   Continue PTA hydroxychloroquine and cellcept 1500 mg po bid     Severe Malnutrition  ---   Based on nutrition assessment   ---   Will provide nutritional supplement            Diet: Room Service  Combination Diet Regular Diet; Thin Liquids (level 0)  Snacks/Supplements Adult: Ensure Enlive; With Meals    DVT Prophylaxis: warfarin   Fitzgerald Catheter: Not present  Central Lines: PRESENT       Cardiac Monitoring: None  Code Status: Full Code      Disposition Plan   TBD            Dave Gallardo MD  Hospitalist Service  [facility]  Securely message with the [employer] Web Console (learn more here)  Text page via [employer] Paging/Directory           ____________________________________________________________________    Interval History    No complaints this morning.  Uneventful night.  Doing well.    Data reviewed today: I reviewed all medications, new labs and imaging results over the last 24 hours.  Physical Exam   Vital Signs: Temp: (!) 95.6  F (35.3  C) Temp src: Oral  BP: 102/74 (MAP =80) Pulse: 67   Resp: 12 SpO2: 97 % O2 Device: None (Room air)    Weight: 130 lbs 11.2 oz  General: aao x 3, NAD.  HEENT:  NC/AT, neck supple  CVS:  NL s 1 and s2, no m/r/g. LVAD sound audible  Lungs:  Bibasilar rhonchi, no wheezing   Abd:  Soft, + bs, NT, no rebound or gaurding, no fluid shift.  Ext:  No c/c.  Lymph:  No edema.  Neuro:  Nonfocal.  Musculoskeletal: No calf tenderness to palpation.    Skin:  No rash.  Psychiatry:  Mood and affect appropriate.      Data   Recent Labs   Lab 08/25/22  0603 08/24/22  0543 08/23/22  0635 08/22/22  0653 08/21/22  0510 08/20/22  0520   WBC  --   --   --  6.9 6.0 6.1   HGB  --   --   --  10.1* 9.5* 9.8*   MCV  --   --   --  94 96 95   PLT  --   --   --  272 243 262   INR 2.29* 2.47* 2.57* 2.47* 2.44* 2.59*   NA  --   --   --  137 136 139   POTASSIUM  --   --   --  4.1 4.1 4.4   CHLORIDE  --   --   --  108 104 106   CO2  --   --   --  26 24 24   BUN  --   --   --  10 12.1 11.4   CR  --   --   --  0.58* 0.53* 0.60*   ANIONGAP  --   --   --  3 8 9   ELDA  --   --   --  9.5 9.1 9.4   GLC  --   --   --  97 90 88   ALBUMIN  --   --   --   --  3.2* 3.4*   PROTTOTAL  --   --   --   --  6.7 7.0   BILITOTAL  --   --   --   --  0.3 0.4   ALKPHOS  --   --   --   --  146* 142*   ALT  --   --   --   --  13 12   AST  --   --   --   --  24 25     No results found for this or any previous visit (from the past 24 hour(s)).

## 2022-08-25 NOTE — PLAN OF CARE
Goal Outcome Evaluation:    Plan of Care Reviewed With: patient     Overall Patient Progress: improving    FOCUS/GOAL  Bowel management, Bladder management, Pain management, Wound care management, Mobility, Skin integrity, and Safety management    ASSESSMENT, INTERVENTIONS AND CONTINUING PLAN FOR GOAL:    Pt A/O x4, flat affect. Transfers SBA using gait belt and ww. Continent of bowel and bladder, LBM 8/25. Reports low back pain, prn x1 effective. LUE where PICC previously present WNL. Sternal incision WNL, JOSE ENRIQUE. LVAD settings WNL, pt able to demonstrate controller change without assistance or cues. MAP 83. MAP to start 8/26 AM's for discharge planning.

## 2022-08-25 NOTE — PROGRESS NOTES
RN CC out-of-office. Team rounds this morning. Team recommending HC. Insurance may be barrier. If HC unable to be located, team aware and agreeable to setting up OP while pt local and staying at [place]. This writer sent referral to [facility] for RN, PT, ot OT. RN CC and Patient Care Supervisor CC'd. Targeting discharge Wed 08/31/2022. Son and dtr providing support.     Lisa Ring [facility] Center[facility]OLA   [facility]   Direct Phone: [phone]  I   Pager: [phone]  I  Fax: [phone]

## 2022-08-25 NOTE — PROGRESS NOTES
[facility]   Acute Rehabilitation Unit  Daily progress note    INTERVAL HISTORY  Dandy Sands was seen and examined at bedside.  Per RN chart check patient with lower back pain given scheduled Tylenol and as needed oxycodone with relief. He complained of VS being checked in the AM and at night. Discussed with him this is important as we would need to follow up on how he is doing. He is agreeable to continue with checks. He continues to make gains during current stay. He denies dizziness, fever, chills, CP, SOB, abdominal discomfort, or new W/N/T. LBM 8/24 x 2.    10 point ROS is negative except what is in HPI.    Functionally   See Today's Team Round Notes.     Today's Updates  - Discontinue PICC line  - Discussed hydralazine and Breo Ellipta with hospitalist: Possible discontinuation    MEDICATIONS    acetaminophen  975 mg Oral Q8H     aspirin  81 mg Oral Daily     atorvastatin  40 mg Oral QPM     digoxin  125 mcg Oral Daily     fluticasone-vilanterol  1 puff Inhalation Daily     heparin lock flush  5-20 mL Intracatheter Q24H     hydrALAZINE  37.5 mg Oral Q8H     hydroxychloroquine  200 mg Oral BID     lisinopril  2.5 mg Oral Daily     melatonin  10 mg Oral At Bedtime     multivitamin w/minerals  1 tablet Oral Daily     mycophenolate  1,500 mg Oral BID     pantoprazole  40 mg Oral QAM AC     QUEtiapine  150 mg Oral or Feeding Tube At Bedtime     QUEtiapine  25 mg Oral BID     sertraline  100 mg Oral Daily     sodium chloride  2 spray Both Nostrils Q4H While awake     sodium chloride (PF)  10-40 mL Intracatheter Q8H     Warfarin Therapy Reminder  1 each Oral See Admin Instructions        heparin lock flush, LORazepam, naloxone **OR** naloxone **OR** naloxone **OR** naloxone, ondansetron, oxyCODONE, polyethylene glycol, QUEtiapine, senna-docusate, sodium chloride (PF)     PHYSICAL EXAM  BP 94/81 (BP Location: Left arm)   Pulse 67   Temp (!) 95.8  F (35.4  C) (Axillary)    "Resp 16   Ht 1.702 m (5' 7\")   Wt 59.3 kg (130 lb 11.2 oz)   SpO2 97%   BMI 20.47 kg/m    General: Up in bed seated, not in acute distress  HEENT: NC/NT, Moist mucous membranes  Pulmonary: Clear bilateral airways on auscultation.  Cardiovascular: Notable LVAD running and  humming.  Abdominal: Non-tender, non-distended, bowel sounds present.  Extremities: warm, well perfused, no edema in bilateral lower extremities, in compression socks, no tenderness in calves, unchanged power 5/5 bilateral upper and lower extremities.  Neuro/MSK: Alert and oriented, face symmetric. Responds well during team rounds discussion.    LABS  Recent Results (from the past 24 hour(s))   INR    Collection Time: 08/25/22  6:03 AM   Result Value Ref Range    INR 2.29 (H) 0.85 - 1.15       Rehabilitation - continue comprehensive acute inpatient rehabilitation program with multidisciplinary approach including therapies, rehab nursing, and physiatry following. See interval history for updates.      ASSESSMENT AND PLAN  Dandy Sands is a 61 year old right hand dominant male with past medical history of SLE, antiphospholipid syndrome, history pulmonary embolism (on anticoagulation with warfarin), HFrEF due to IMC, HDL. He initially presented to [facility] on 6/13 due to nausea, vomiting, abdominal bloating and transferred to [facility] for admission on 6/17/2022 for decompensated heart failure 2/2 cardiogenic shock c/b multiorgan failure requiring IABP, is s/p HM 3 LVAD on 7/8/2022. Stay was also c/b RV, had 29F Protek duo via RIJ, RVAD removed 7/21, hemopericardium requiring repeat washout, chest was closed 7/13. Other complication epistaxis on 8/6 ENT. Patient has h/o nasal perforation that required elective procedure (in [place]) which was postponed due to LVAD insertion.  He required intubation 8/7 for airway protection, was extubated 8/8.  Nasal pack removed 8/9.     Admission to acute inpatient rehab: Cardiac; s/p HM3 LVAD.  "   Impairment group code: 09        1. PT, OT 90 minutes of each on a daily basis, in addition to rehab nursing and close management of physiatrist.       2. Impairment of ADL's: Impairment in strength, endurance, and ROM resulting in functional limitations in patient's ability to perform ADLs.     3. Impairment of mobility:  Impairment in strength, endurance, and ROM resulting in functional limitations in patient's ability to perform functional mobility.     4. Impairment of cognition/language/swallow:  N/A     5. Medical Conditions     #HFrEF 2/2 ICM s/p HM3 LVAD in setting of cardiogenic shock (SCAI D)  #RV failure s/p Protek duo temporary RVAD s/p decannulation 7/21  #RV thrombus  #Post chest closure hemopericardium s/p repeat washout (7/12)  #PMH CAD s/p PCI to LAD (2005)  #S/p CRT-D (2006)  Patient with ICM s/p HM3 LVAD 7/8/22 2/2 cardiogenic shock requiring MCS with IABP as prior to HM (initially evaluated for heart transplant, however noted to have substantially elevated DSAs during course of care, so decision made to proceed with LVAD given patient was already on temporary MCS). Intraoperative course during LVAD placement was c/b RVF resulting in cardiogenic shock requiring high dose pressors necessitating temporary RVAD support. Initial post-op course c/b hemopericardium requiring repeat washout with chest left open, with good recovery extubation and decannulation on 7/21. Patient tolerating hemodynamics and end organ standpoint well. He has had intermittent low flow alarms with high PI, no power spike, and a closed aortic valve on TTE. TTE also showed underfilling LV which could explain his low flow alarms d/t suckdown event so his LVAD speed was reduced to 5200. Patient did have a low flow alarm on 8/20 @ 1145 (PI 8-9 and not hypotensive); CTA chest done which showed that the LVAD outflow tract is widely patent. Patient is euvolemic on exam, but at times has had orthostasis symptoms; patient encouraged to  stay hydrated within his fluid/diet restriction guidelines (no more than 2L/day of fluid intake and no more than 3g of Na per day in dietary intake).  P:  LVAD:  - interrogation showed 1 low flow alarm in last 24 hours as described above  - Is euvolemic 8/21  - Diuretics: Not needed at this time; monitor volume status and weight and consider starting low dose Lasix if he gains weight or starts showing signs of hypervolemia  - Afterload reduction: MAP goal 65-80; Hydralazine 37.5 TID + Lisinopril 2.5 qday  - Statin: Atorvastatin 40mg  - BB: Holding in setting of recent cardiogenic shock and orthostatic symptoms, consider restarting outpatient  - AA: Holding in setting of recent cardiogenic shock and orthostatic symptoms, consider restarting outpatient  - SGLTi: As outpatient, has been held due to immunosuppression he is on for his SLE.  - Warfarin for INR goal 2-3  - ASA 81 mg qday     #Epistaxis, resolved  #Intubated due to Concern for airway protection (8/7)-Extubated (8/8)  #Mild-moderate emphysema  #History of COVID-19  #Iatrogenic R apical PTX  H/o nasal perforation  For elective procedure (in [place]), postponed 2/2 LVAD insertion. Epistaxis not fixed by packing overnight on 8/6 and pt continued to bleed. Intubated for airway protection. Controlled at bedside by ENT s/p cautery and packing. Extubated 8/8. Had Cefepime/Vanc empirically for broad coverage for PNA (8/7-8/12). Patient removed his own nasal packing overnight on 8/9. ENT no longer following.    - C/t Ayr nasal saline gel at bedtime + nasal saline q4H while awake 8/24  -  followup with his ENT as OP (see below)     #Insomnia/Delirium, resolved  Some concern that he had a fall overnight on 8/6. Discussed with nursing staff and he did NOT have a fall, although he did try to get out of bed. CT head was obtained (8/7) and it was unremarkable. Patient had some issues with delirium and insomnia during hospitalization and psychiatry was consulted who  recommended  - Sertraline and Quetiapine per psych recs     # ? Orthostatic  Noted to be dizzy with therapy.  Has occasional hypotension with SBP high 80s.  May benefit from compressions.  -Compression socks and, abdominal binder 8/22    #GERD  #Congestive hepatopathy in the setting of cardiac insuffiency - Resolved  #Hematemesis / oral mucosal bleeding -resolved    #Dysphagia  GI consulted 7/31 for black tarry stools and a possible GI bleed, however it is more likely swallowed blood from epistaxis that patient previously had. GI did not recommend an upper endoscopy. Recurrence of bloody stools likely due to epistaxis which have resolved. Hemoglobin was monitored and continued to be stable throughout the rest of the hospitalization. Cleared for regular diet by SLP.     #Anemia due to blood loss from Epistaxis-Resolved  #History of DVT and PE   #Antiphospholipid antibody syndrome  #History of iron deficiency anemia  As noted above, likely related to epistaxis. After above interventions were complete and management was initiated per ENT recs, hemoglobin remained stable and no further evidence of bleeding.  - Saline (OCEAN) nasal sprays Q4H when awake.     #SLE  - PTA meds: hydroxychloroquine 200mg, resumed 7/16  - Restart PTA Cellcept at discharge     #Oral thrush, resolved  Completed 14 days of nystatin        6. Adjustment to disability:  Clinical psychology to eval and treat as needed  7. FEN:  Regular with thins  8. Bowel: As needed MiraLAX and Senokot  9. Bladder: Pending review  10. DVT Prophylaxis: Warfarin goal 2-3 for LVAD, daily INR, and ASA 81  11. GI Prophylaxis: Pantoprazole 40 mg daily  12. Code: Full  13. Disposition: Home with homecare and Home with outpatient therapy  14. ELOS: 12 to 14 days.  15. Rehab prognosis: Fair   16. Follow up Appointments on Discharge:   -PCP follow-up 2 to 3 weeks : Requires CBC BMP  -Follow-up with [facility] clinic at next available appointment and follow-up with your  cardiologist in the next few months at next available appointment   - Follow-up with psychiatry post discharge  - Followup with his ENT in [place] to rediscuss possible procedure for his nasal perforation vs. conservative management       Patient seen and discussed with Dr. Mathew, PM&R Staff Physician    Padmini Linda MD   Resident Physician, PGY-2   Physical Medicine and Rehabilitation  [facility]

## 2022-08-25 NOTE — DISCHARGE INSTRUCTIONS
Going home with your VAD    You are about to leave the hospital with your new VAD. This time can feel overwhelming. Your VAD Coordinator team is here to do everything we can to make this transition as smooth as possible. Below are contact numbers and information to help ease the process. A VAD Coordinator is available 24/7 should you have any questions. We would be more than happy to assist you.     Please remember, if you have a true emergency, ALWAYS call 911 first. Then call the on-call VAD Coordinator.     To Reach the On-Call VAD Coordinator: Dial the main hospital number at [phone]. Choose option 4 to speak with the . Ask him or her to page the on-call VAD Coordinator. We should get back to you within five minutes.     Symptoms to Report: Please contact the on-call VAD Coordinator to report:  Bleeding   Dizziness/lightheadedness  Changes in your VAD parameters (+/- 2 from baseline)  VAD alarms  Numbness, tingling, or difficulty moving your arms or legs  Changes in speech, swallowing, or mental status  ANY head injury, even if it is just a small bump  Dark, black, tarry, red, or bloody stools  If you vomit and it is bloody, black, or looks like coffee grounds  Increased drainage, redness, tenderness, or trauma to your driveline site, chest incision, or chest tube sites    Questions about your medications  Questions about your Coumadin or INR  Any other changes or concerns    Dressing Supplies: Your dressing supply company is [employer]. Please call them once you leave the hospital. They can be reached at [phone]. Verify that they have the correct address for delivery, especially if you are staying in the [place] before going home.     Blood Thinners: Your Coumadin (warfarin) will be managed by the [facility]. They can be reached M-F, 8am-4pm. They will communicate with you regarding your blood draws and your Coumadin dose. If you have an INR drawn and you  don t hear from the clinic by 2pm please call them at [phone].  Please never allow anyone else to adjust your Coumadin or Aspirin without talking to a VAD Coordinator.     Clinic Appointments: The VAD team will schedule you for all of your follow-up visits. If you have any questions please call the main VAD office at [phone] to speak with our . You can also contact your VAD Coordinator.     VAD Coordinator: Your VAD Coordinator, Chantal Brasher, can be reached M-F, 8am-4pm, at [phone] or [email or web address].    If you have any further questions or concerns about your VAD please refer to the discharge folder you received from your VAD Coordinator and/or call the on-call VAD Coordinator.             Instructions       You are scheduled to have a dental appointment  on 09/07/22 at 10 am please arrive by 9:45 am       [facility]  Address: [address] [place] [address]  Phone: [phone]      Please schedule to see your Primary Care Provider for  follow-up 2 to 3 weeks post discharge. Requires labs- CBC and BMP       [facility] clinic and cardiologist   You are scheduled to see Jeana SAMAYOA CNP on 09/06/2022 at 3:15pm    Address   [facility] [address] [address]  Phone   [phone]      Please schedule follow-up with psychiatry post discharge (business hours 8 am to 5 pm Monday- Friday)    Address    [facility]                   [address] [place] [address]                     Phone        [phone]      Please schedule follow-up with ENT in [place] to rediscuss possible procedure for nasal perforation vs. conservative management   (business hours 8 am to 5 pm Monday- Friday) Labor day might differ.   Address:   [facility]                    [address], [place] [address]  Phone       [phone]    INR Instructions: Please see follow up instructions for Monday or Tuesday.    Home Care    [facility]: [phone] (previously known as: [facility])  Nurse, physical therapy, and occupational therapy.   -You will get a call after you have discharged from Acute Rehab Hospital to schedule the first home care visit. The home health nurse should come out within the first 24-48 hours. If you don't get a call from them within the first 48 hours, please call to follow up (number above).

## 2022-08-25 NOTE — PLAN OF CARE
Alert and oriented x4, denies headache or dizziness, denies numbness or tingling. Currently receiving L vad training together with his son and daughter in law. L vad parametrs WDL, MAP is 84. Had teams rounds today, will start MAP today in preparation for discharge. MAP already explained, will continue POC      Goal Outcome Evaluation:

## 2022-08-25 NOTE — CONSULTS
[facility]   Cardiology II Service / Advanced Heart Failure  ARU/TCU Consult Note      Patient: Dandy Sands  MRN: [id number]  Admission Date: 8/21/2022  ARU/TCU Day # 4    Assessment and Plan: Dandy Sands is a 62 yo male with HFrEF 2/2 ICM s/p HM3 in the setting of cardiogenic shock on 7/8/22 c/b RV failure with temporary RVAD support with Protek duo (removed 7/21) and post chest closure hemopericardium s/p repeat washout on 7/12, epistaixis requiring reintubation for airway protection. Other patient medical history includes HTN, HLD, lupus, and antiphospholipid syndrome.    Recommendations:  - Recheck LDH, BMP, and CBC on 8/26  - Recommend biotin mouth wash for his dry mouth symptoms    # Chronic systolic heart failure secondary to ICM   # s/p HM3 LVAD on 7/8/12  c/b RV failure with temporary RVAD support with Protek duo (removed 7/21) and post chest closure hemopericardium s/p repeat washout on 7/12, epistaixis requiring reintubation for airway protection.  Stage D. NYHA Class III- confounded by recent surgery    Fluid status euvolemic off diuretics  ACEi/ARB yes- lisinopril 2.5 mg daily and hydralazine 37.5 mg TID, will work to transition him onto lisinopril and off hydralazine, but awaiting repeat renal function  BB Deferred give recent LVAD implant with RV failure requiring protek   Aldosterone antagonist deferred while other medical therapy is prioritized and given recent hypovolemia  SGLT2i- defered given patient immunosuppression and unclear evidence in LVAD patients  RV support digoxin 125 mcg dialy  SCD prophylaxis ICD  MAP: Goal MAP 65-85, has overall been within goal  LDH trends: please recheck tomorrow  Anticoagulation: warfarin INR goal 2-3, today 2.29  Antiplatelet: ASA 81 mg daily  Low flow alarms: CT-A in the hospital negative. ECHO in the hospital with LVIDd 4.6, AV not opening, mild AI. Bps have been controlled. Appear to be related to volume status as patient has not had any  alarms in the last 48 hours (since increasing his PO intake).    The patient's HeartMate LVAD was interrogated 8/25/2022  * Speed 5200 rpm   * Pulsatility index 6.6   * Power 3.8 Kellogg   * Flow 3.4 L/minute   Fluid status: euvoelmic   Alarms were reviewed, and notable for clusters of PI events with occaisonal associated speed drops, history goes back4 days. 1 Low flow alarms on 8/22 and one on 8/23..   The driveline exit site was inspected, c/d/i.   All external components were inspected and showed no evidence of damage or malfunction, none replaced.   No changes to VAD settings made    #Epistaxis  #Melena  During his recent admission had masive epistaxis requiring packing and ultimately required reintubation for airway protection. Had melena in this setting which was felt to be related to the epistaxis and no further GI work-up was pursued.   - Continue nasal saline    #E.faecalis on culture from PICC line (8/2/22) - 1/2 tubes in 1/4 sets collected 8/2  #S.epidermidis (MSSE) on culture from PICC line (8/2/22) - 1/2 tubes in 1/4 sets collected 8/2  Treated with Cefepime/Vanc empirically for broad coverage, antibiotics ended 8/12    #SLE  - PTA meds: hydroxychloroquine 200mg, resumed 7/16  - PTA Cellcept was resumed at discharge from [facility]    #History of DVT and PE   #Antiphospholipid antibody syndrome  - Anticoaguation as above    #Arm numbness and tingling, intermittent. First noted a few days a go and now coming and going. Negative head CT reassuring. Likely related to positioning and possibly to his LVAD straps.     Laila España PA-C  Advanced Heart Failure/Cardiology II Service  Pager [phone]    ================================================================    Subjective/24-Hr Events:   Last 24 hr care team notes reviewed. Dandy has been feeling very well since transfering over to ARU. He feels that he is making good progress with therapy and is able to walk down the freitas and back. Early on, he was  "having some low flow alarms although none for the last 2 days. These are totally asympomtaic without any lightheadedness or dizziness. He denies any infectious symptoms including drivline infection symptoms. He denies any bleeding symptoms including epistaxis, melena, BRBPR, and hematuria. He continues to have some numbness and tingling intermittently in his left arm. No associated symptoms. Self resolving. Not sure what position it is most likely to occur in.    ROS:  4 point ROS including respiratory, CV, GI and  (other than that noted in the HPI) is negative.     Medications: Reviewed in EPIC.     Physical Exam:   /74 (BP Location: Left arm)   Pulse 67   Temp (!) 95.6  F (35.3  C) (Oral)   Resp 12   Ht 1.702 m (5' 7\")   Wt 59.3 kg (130 lb 11.2 oz)   SpO2 97%   BMI 20.47 kg/m      GENERAL: Appears comfortable, in no distress .  HEENT: Eye symmetrical, no discharge or icterus bilaterally. Mucous membranes moist and without lesions.  NECK: Supple, JVD ~6-7.   CV: Hum of HM3, no adventitious sounds  RESPIRATORY: Respirations regular, even, and unlabored. Lungs CTA throughout.   GI: Soft and non distended with normoactive bowel sounds present in all quadrants. No tenderness, rebound, guarding.   EXTREMITIES: Trace b/l peripheral edema. No pulses. All extremities are warm and well perfused   NEUROLOGIC: Alert and interacting appropriatly.. No focal deficits.   MUSCULOSKELETAL: No joint swelling or tenderness.   SKIN: No jaundice. No rashes or lesions.     Labs:  CMP  Recent Labs   Lab 08/22/22  0653 08/21/22  0510 08/20/22  0520 08/19/22  0642    136 139 138   POTASSIUM 4.1 4.1 4.4 3.8   CHLORIDE 108 104 106 104   CO2 26 24 24 26   ANIONGAP 3 8 9 8   GLC 97 90 88 99   BUN 10 12.1 11.4 12.9   CR 0.58* 0.53* 0.60* 0.55*   GFRESTIMATED >90 >90 >90 >90   ELDA 9.5 9.1 9.4 9.1   MAG  --  1.7 1.6* 1.7   PHOS  --  4.3 4.4  --    PROTTOTAL  --  6.7 7.0 6.7   ALBUMIN  --  3.2* 3.4* 3.2*   BILITOTAL  --  0.3 " 0.4 0.4   ALKPHOS  --  146* 142* 146*   AST  --  24 25 23   ALT  --  13 12 10       CBC  Recent Labs   Lab 08/22/22  0653 08/21/22  0510 08/20/22  0520 08/19/22  0642   WBC 6.9 6.0 6.1 5.9   RBC 3.47* 3.29* 3.44* 3.41*   HGB 10.1* 9.5* 9.8* 9.9*  9.9*   HCT 32.5* 31.5* 32.8* 32.0*   MCV 94 96 95 94   MCH 29.1 28.9 28.5 29.0   MCHC 31.1* 30.2* 29.9* 30.9*   RDW 17.0* 16.9* 17.1* 17.0*    243 262 242       INR  Recent Labs   Lab 08/25/22  0603 08/24/22  0543 08/23/22  0635 08/22/22  0653   INR 2.29* 2.47* 2.57* 2.47*       Time/Communication  I personally spent a total of 30 minutes. Of that >15 minutes was counseling/coordination of patient's care. Plan of care discussed with patient. See my note above for details.

## 2022-08-26 ENCOUNTER — APPOINTMENT (OUTPATIENT)
Dept: OCCUPATIONAL THERAPY | Facility: CLINIC | Age: 62
DRG: 949 | End: 2022-08-26
Attending: PHYSICAL MEDICINE & REHABILITATION
Payer: COMMERCIAL

## 2022-08-26 ENCOUNTER — APPOINTMENT (OUTPATIENT)
Dept: PHYSICAL THERAPY | Facility: CLINIC | Age: 62
DRG: 949 | End: 2022-08-26
Attending: PHYSICAL MEDICINE & REHABILITATION
Payer: COMMERCIAL

## 2022-08-26 LAB
HOLD SPECIMEN: NORMAL
HOLD SPECIMEN: NORMAL
INR PPP: 2.26 (ref 0.85–1.15)
MDC_IDC_LEAD_IMPLANT_DT: NORMAL
MDC_IDC_LEAD_IMPLANT_DT: NORMAL
MDC_IDC_LEAD_LOCATION: NORMAL
MDC_IDC_LEAD_LOCATION: NORMAL
MDC_IDC_LEAD_LOCATION_DETAIL_1: NORMAL
MDC_IDC_LEAD_LOCATION_DETAIL_1: NORMAL
MDC_IDC_LEAD_MFG: NORMAL
MDC_IDC_LEAD_MFG: NORMAL
MDC_IDC_LEAD_MODEL: NORMAL
MDC_IDC_LEAD_MODEL: NORMAL
MDC_IDC_LEAD_POLARITY_TYPE: NORMAL
MDC_IDC_LEAD_POLARITY_TYPE: NORMAL
MDC_IDC_LEAD_SERIAL: NORMAL
MDC_IDC_LEAD_SERIAL: NORMAL
MDC_IDC_MSMT_BATTERY_DTM: NORMAL
MDC_IDC_MSMT_BATTERY_REMAINING_LONGEVITY: 59 MO
MDC_IDC_MSMT_BATTERY_RRT_TRIGGER: 2.73
MDC_IDC_MSMT_BATTERY_STATUS: NORMAL
MDC_IDC_MSMT_BATTERY_VOLTAGE: 2.93 V
MDC_IDC_MSMT_CAP_CHARGE_DTM: NORMAL
MDC_IDC_MSMT_CAP_CHARGE_ENERGY: 18 J
MDC_IDC_MSMT_CAP_CHARGE_TIME: 3.97
MDC_IDC_MSMT_CAP_CHARGE_TYPE: NORMAL
MDC_IDC_MSMT_LEADCHNL_RA_IMPEDANCE_VALUE: 304 OHM
MDC_IDC_MSMT_LEADCHNL_RA_PACING_THRESHOLD_AMPLITUDE: 0.5 V
MDC_IDC_MSMT_LEADCHNL_RA_PACING_THRESHOLD_PULSEWIDTH: 0.4 MS
MDC_IDC_MSMT_LEADCHNL_RA_SENSING_INTR_AMPL: 0.6 MV
MDC_IDC_MSMT_LEADCHNL_RV_IMPEDANCE_VALUE: 418 OHM
MDC_IDC_MSMT_LEADCHNL_RV_IMPEDANCE_VALUE: 475 OHM
MDC_IDC_MSMT_LEADCHNL_RV_PACING_THRESHOLD_AMPLITUDE: 4 V
MDC_IDC_MSMT_LEADCHNL_RV_PACING_THRESHOLD_PULSEWIDTH: 1 MS
MDC_IDC_MSMT_LEADCHNL_RV_SENSING_INTR_AMPL: 0.6 MV
MDC_IDC_PG_IMPLANT_DTM: NORMAL
MDC_IDC_PG_MFG: NORMAL
MDC_IDC_PG_MODEL: NORMAL
MDC_IDC_PG_SERIAL: NORMAL
MDC_IDC_PG_TYPE: NORMAL
MDC_IDC_SESS_CLINIC_NAME: NORMAL
MDC_IDC_SESS_DTM: NORMAL
MDC_IDC_SESS_TYPE: NORMAL
MDC_IDC_SET_BRADY_AT_MODE_SWITCH_RATE: 171 {BEATS}/MIN
MDC_IDC_SET_BRADY_HYSTRATE: NORMAL
MDC_IDC_SET_BRADY_LOWRATE: 50 {BEATS}/MIN
MDC_IDC_SET_BRADY_MAX_SENSOR_RATE: 115 {BEATS}/MIN
MDC_IDC_SET_BRADY_MAX_TRACKING_RATE: 130 {BEATS}/MIN
MDC_IDC_SET_BRADY_MODE: NORMAL
MDC_IDC_SET_BRADY_PAV_DELAY_LOW: 350 MS
MDC_IDC_SET_BRADY_SAV_DELAY_LOW: 350 MS
MDC_IDC_SET_LEADCHNL_RA_PACING_AMPLITUDE: 2 V
MDC_IDC_SET_LEADCHNL_RA_PACING_ANODE_ELECTRODE_1: NORMAL
MDC_IDC_SET_LEADCHNL_RA_PACING_ANODE_LOCATION_1: NORMAL
MDC_IDC_SET_LEADCHNL_RA_PACING_CAPTURE_MODE: NORMAL
MDC_IDC_SET_LEADCHNL_RA_PACING_CATHODE_ELECTRODE_1: NORMAL
MDC_IDC_SET_LEADCHNL_RA_PACING_CATHODE_LOCATION_1: NORMAL
MDC_IDC_SET_LEADCHNL_RA_PACING_POLARITY: NORMAL
MDC_IDC_SET_LEADCHNL_RA_PACING_PULSEWIDTH: 0.4 MS
MDC_IDC_SET_LEADCHNL_RA_SENSING_ANODE_ELECTRODE_1: NORMAL
MDC_IDC_SET_LEADCHNL_RA_SENSING_ANODE_LOCATION_1: NORMAL
MDC_IDC_SET_LEADCHNL_RA_SENSING_CATHODE_ELECTRODE_1: NORMAL
MDC_IDC_SET_LEADCHNL_RA_SENSING_CATHODE_LOCATION_1: NORMAL
MDC_IDC_SET_LEADCHNL_RA_SENSING_POLARITY: NORMAL
MDC_IDC_SET_LEADCHNL_RA_SENSING_SENSITIVITY: 0.3 MV
MDC_IDC_SET_LEADCHNL_RV_PACING_AMPLITUDE: 5 V
MDC_IDC_SET_LEADCHNL_RV_PACING_ANODE_ELECTRODE_1: NORMAL
MDC_IDC_SET_LEADCHNL_RV_PACING_ANODE_LOCATION_1: NORMAL
MDC_IDC_SET_LEADCHNL_RV_PACING_CAPTURE_MODE: NORMAL
MDC_IDC_SET_LEADCHNL_RV_PACING_CATHODE_ELECTRODE_1: NORMAL
MDC_IDC_SET_LEADCHNL_RV_PACING_CATHODE_LOCATION_1: NORMAL
MDC_IDC_SET_LEADCHNL_RV_PACING_POLARITY: NORMAL
MDC_IDC_SET_LEADCHNL_RV_PACING_PULSEWIDTH: 1 MS
MDC_IDC_SET_LEADCHNL_RV_SENSING_ANODE_ELECTRODE_1: NORMAL
MDC_IDC_SET_LEADCHNL_RV_SENSING_ANODE_LOCATION_1: NORMAL
MDC_IDC_SET_LEADCHNL_RV_SENSING_CATHODE_ELECTRODE_1: NORMAL
MDC_IDC_SET_LEADCHNL_RV_SENSING_CATHODE_LOCATION_1: NORMAL
MDC_IDC_SET_LEADCHNL_RV_SENSING_POLARITY: NORMAL
MDC_IDC_SET_LEADCHNL_RV_SENSING_SENSITIVITY: 0.3 MV
MDC_IDC_SET_ZONE_DETECTION_BEATS_DENOMINATOR: 40 {BEATS}
MDC_IDC_SET_ZONE_DETECTION_BEATS_NUMERATOR: 30 {BEATS}
MDC_IDC_SET_ZONE_DETECTION_INTERVAL: 280 MS
MDC_IDC_SET_ZONE_DETECTION_INTERVAL: 320 MS
MDC_IDC_SET_ZONE_DETECTION_INTERVAL: 350 MS
MDC_IDC_SET_ZONE_DETECTION_INTERVAL: 350 MS
MDC_IDC_SET_ZONE_DETECTION_INTERVAL: 400 MS
MDC_IDC_SET_ZONE_TYPE: NORMAL
MDC_IDC_STAT_AT_BURDEN_PERCENT: 0 %
MDC_IDC_STAT_AT_DTM_END: NORMAL
MDC_IDC_STAT_AT_DTM_START: NORMAL
MDC_IDC_STAT_BRADY_AP_VP_PERCENT: 0 %
MDC_IDC_STAT_BRADY_AP_VS_PERCENT: 0 %
MDC_IDC_STAT_BRADY_AS_VP_PERCENT: 0 %
MDC_IDC_STAT_BRADY_AS_VS_PERCENT: 100 %
MDC_IDC_STAT_BRADY_DTM_END: NORMAL
MDC_IDC_STAT_BRADY_DTM_START: NORMAL
MDC_IDC_STAT_BRADY_RA_PERCENT_PACED: 0 %
MDC_IDC_STAT_BRADY_RV_PERCENT_PACED: 0 %
MDC_IDC_STAT_EPISODE_RECENT_COUNT: 0
MDC_IDC_STAT_EPISODE_RECENT_COUNT_DTM_END: NORMAL
MDC_IDC_STAT_EPISODE_RECENT_COUNT_DTM_START: NORMAL
MDC_IDC_STAT_EPISODE_TOTAL_COUNT: 0
MDC_IDC_STAT_EPISODE_TOTAL_COUNT: 1
MDC_IDC_STAT_EPISODE_TOTAL_COUNT: 1
MDC_IDC_STAT_EPISODE_TOTAL_COUNT: 33
MDC_IDC_STAT_EPISODE_TOTAL_COUNT: 9
MDC_IDC_STAT_EPISODE_TOTAL_COUNT_DTM_END: NORMAL
MDC_IDC_STAT_EPISODE_TOTAL_COUNT_DTM_START: NORMAL
MDC_IDC_STAT_EPISODE_TYPE: NORMAL
MDC_IDC_STAT_TACHYTHERAPY_ATP_DELIVERED_RECENT: 0
MDC_IDC_STAT_TACHYTHERAPY_ATP_DELIVERED_TOTAL: 0
MDC_IDC_STAT_TACHYTHERAPY_RECENT_DTM_END: NORMAL
MDC_IDC_STAT_TACHYTHERAPY_RECENT_DTM_START: NORMAL
MDC_IDC_STAT_TACHYTHERAPY_SHOCKS_ABORTED_RECENT: 0
MDC_IDC_STAT_TACHYTHERAPY_SHOCKS_ABORTED_TOTAL: 0
MDC_IDC_STAT_TACHYTHERAPY_SHOCKS_DELIVERED_RECENT: 0
MDC_IDC_STAT_TACHYTHERAPY_SHOCKS_DELIVERED_TOTAL: 1
MDC_IDC_STAT_TACHYTHERAPY_TOTAL_DTM_END: NORMAL
MDC_IDC_STAT_TACHYTHERAPY_TOTAL_DTM_START: NORMAL

## 2022-08-26 PROCEDURE — 99232 SBSQ HOSP IP/OBS MODERATE 35: CPT | Performed by: INTERNAL MEDICINE

## 2022-08-26 PROCEDURE — 250N000013 HC RX MED GY IP 250 OP 250 PS 637

## 2022-08-26 PROCEDURE — 97535 SELF CARE MNGMENT TRAINING: CPT | Mod: GO | Performed by: STUDENT IN AN ORGANIZED HEALTH CARE EDUCATION/TRAINING PROGRAM

## 2022-08-26 PROCEDURE — 36415 COLL VENOUS BLD VENIPUNCTURE: CPT

## 2022-08-26 PROCEDURE — 250N000012 HC RX MED GY IP 250 OP 636 PS 637

## 2022-08-26 PROCEDURE — 128N000003 HC R&B REHAB

## 2022-08-26 PROCEDURE — 250N000013 HC RX MED GY IP 250 OP 250 PS 637: Performed by: INTERNAL MEDICINE

## 2022-08-26 PROCEDURE — 250N000013 HC RX MED GY IP 250 OP 250 PS 637: Performed by: PHYSICAL MEDICINE & REHABILITATION

## 2022-08-26 PROCEDURE — 97750 PHYSICAL PERFORMANCE TEST: CPT | Mod: GP | Performed by: PHYSICAL THERAPIST

## 2022-08-26 PROCEDURE — 85610 PROTHROMBIN TIME: CPT

## 2022-08-26 PROCEDURE — 99233 SBSQ HOSP IP/OBS HIGH 50: CPT | Mod: GC | Performed by: PHYSICAL MEDICINE & REHABILITATION

## 2022-08-26 RX ORDER — WARFARIN SODIUM 3 MG/1
6 TABLET ORAL
Status: COMPLETED | OUTPATIENT
Start: 2022-08-26 | End: 2022-08-26

## 2022-08-26 RX ADMIN — QUETIAPINE FUMARATE 25 MG: 25 TABLET ORAL at 15:54

## 2022-08-26 RX ADMIN — SERTRALINE HYDROCHLORIDE 100 MG: 100 TABLET ORAL at 09:24

## 2022-08-26 RX ADMIN — HYDROXYCHLOROQUINE SULFATE 200 MG: 200 TABLET, FILM COATED ORAL at 09:24

## 2022-08-26 RX ADMIN — SALINE NASAL SPRAY 2 SPRAY: 1.5 SOLUTION NASAL at 13:49

## 2022-08-26 RX ADMIN — ACETAMINOPHEN 975 MG: 325 TABLET, FILM COATED ORAL at 21:31

## 2022-08-26 RX ADMIN — HYDROXYCHLOROQUINE SULFATE 200 MG: 200 TABLET, FILM COATED ORAL at 21:32

## 2022-08-26 RX ADMIN — FLUTICASONE FUROATE AND VILANTEROL TRIFENATATE 1 PUFF: 100; 25 POWDER RESPIRATORY (INHALATION) at 09:23

## 2022-08-26 RX ADMIN — QUETIAPINE FUMARATE 150 MG: 50 TABLET ORAL at 21:31

## 2022-08-26 RX ADMIN — WARFARIN SODIUM 6 MG: 3 TABLET ORAL at 18:56

## 2022-08-26 RX ADMIN — ACETAMINOPHEN 975 MG: 325 TABLET, FILM COATED ORAL at 13:48

## 2022-08-26 RX ADMIN — DIGOXIN 125 MCG: 125 TABLET ORAL at 09:25

## 2022-08-26 RX ADMIN — MYCOPHENOLATE MOFETIL 1500 MG: 500 TABLET, FILM COATED ORAL at 21:32

## 2022-08-26 RX ADMIN — Medication 10 MG: at 21:31

## 2022-08-26 RX ADMIN — Medication 37.5 MG: at 20:16

## 2022-08-26 RX ADMIN — QUETIAPINE FUMARATE 25 MG: 25 TABLET ORAL at 09:24

## 2022-08-26 RX ADMIN — SALINE NASAL SPRAY 2 SPRAY: 1.5 SOLUTION NASAL at 21:32

## 2022-08-26 RX ADMIN — Medication 37.5 MG: at 13:48

## 2022-08-26 RX ADMIN — Medication 37.5 MG: at 09:24

## 2022-08-26 RX ADMIN — SALINE NASAL SPRAY 2 SPRAY: 1.5 SOLUTION NASAL at 09:23

## 2022-08-26 RX ADMIN — MULTIPLE VITAMINS W/ MINERALS TAB 1 TABLET: TAB at 09:19

## 2022-08-26 RX ADMIN — PANTOPRAZOLE SODIUM 40 MG: 40 TABLET, DELAYED RELEASE ORAL at 09:27

## 2022-08-26 RX ADMIN — ATORVASTATIN CALCIUM 40 MG: 40 TABLET, FILM COATED ORAL at 21:31

## 2022-08-26 RX ADMIN — OXYCODONE HYDROCHLORIDE 5 MG: 5 TABLET ORAL at 21:34

## 2022-08-26 RX ADMIN — SALINE NASAL SPRAY 2 SPRAY: 1.5 SOLUTION NASAL at 18:55

## 2022-08-26 RX ADMIN — ACETAMINOPHEN 975 MG: 325 TABLET, FILM COATED ORAL at 05:39

## 2022-08-26 RX ADMIN — ASPIRIN 81 MG: 81 TABLET, CHEWABLE ORAL at 09:25

## 2022-08-26 RX ADMIN — LISINOPRIL 2.5 MG: 2.5 TABLET ORAL at 09:26

## 2022-08-26 RX ADMIN — MYCOPHENOLATE MOFETIL 1500 MG: 500 TABLET, FILM COATED ORAL at 09:30

## 2022-08-26 ASSESSMENT — ACTIVITIES OF DAILY LIVING (ADL)
ADLS_ACUITY_SCORE: 29
ADLS_ACUITY_SCORE: 28
ADLS_ACUITY_SCORE: 29
ADLS_ACUITY_SCORE: 29
ADLS_ACUITY_SCORE: 26
ADLS_ACUITY_SCORE: 29
ADLS_ACUITY_SCORE: 28
ADLS_ACUITY_SCORE: 31
ADLS_ACUITY_SCORE: 29
ADLS_ACUITY_SCORE: 29
ADLS_ACUITY_SCORE: 31
ADLS_ACUITY_SCORE: 29

## 2022-08-26 NOTE — PLAN OF CARE
Goal Outcome Evaluation:    Plan of Care Reviewed With: patient     Overall Patient Progress: no change    FOCUS/GOAL  Bowel management, Bladder management, Pain management, Mobility, Skin integrity, and Safety management    ASSESSMENT, INTERVENTIONS AND CONTINUING PLAN FOR GOAL:    Pt A/O x4, flat affect. Transfers SBA using gait belt and ww. Continent of bowel and bladder, LBM 8/25. Sternal incision WNL, JOSE ENRIQUE. LVAD settings WNL, pt able to demonstrate controller change without assistance or cues. MAP 82 and 80. MAP started this morning for discharge planning. Pt did not call for meds; but able to read medication list and pick out appropriate bottles according to name/dose.

## 2022-08-26 NOTE — PLAN OF CARE
FOCUS/GOAL  Medication management and Medical management    ASSESSMENT, INTERVENTIONS AND CONTINUING PLAN FOR GOAL:  Patient had a good night of sleep. LVAD parameters were WNL. MAP 78. Medications filled for MAP purpose. Voided using urinal twice this shift. No BM this shift. LBM 8/25. Scheduled Tylenol given this morning with Patient's satisfaction. Neuro check ok, done once this shift. Will continue with POC.  Goal Outcome Evaluation:          Overall Patient Progress: no change    Outcome Evaluation: No change this shift.

## 2022-08-26 NOTE — PROGRESS NOTES
[facility]    Medicine Progress Note - Hospitalist Service    Date of Admission:  8/21/2022    Assessment & Plan        Dandy Sands is a 60 yo male with medical history of SLE, antiphospholipid antibody syndrome, PE on anticoagulation with warfarin, HFrEF due to ICM and HDL.  He initially presented to [facility] in [place] on 6/13/22 with nausea, vomiting and abd bloating.  Transferred to [facility] on 6/17/22 w/ decompensated heart failure, cardiogenic shock and multiorgan failure.  He  required IABP and then underwent HM3 LVAD placement on 7/8/22 by Dr Zamora.  Surgery was complicated with RV failure requiring 29F Protek duo via RIJ RVAD placement ( removed 7/21/22), hemopericardium requiring repeat washout, chest was closed 7/13/22.  Other post LVAD placement complication included epistaxis requiring intubation 8/7/22 for airway protection.  Pt removed his own nasal packing.  He was eventually extubated.  He was transferred to acute inpatient rehab 8/21/2022 for further rehab and cares       HFrEF 2/2 ICM s/p HM3 LVAD in setting of cardiogenic shock   RV failure s/p Protek duo temporary RVAD, s/p decannulation 7/21  RV thrombus  Post chest closure hemopericardium, s/p repeat washout (7/12)  H/o CAD s/p PCI to LAD (2005), S/p CRT-D (2006), NTEMI 2007  ---   Coronary angio 5/2022 showed severe ostial LAD disease with instent restenosis, mid LAD involved diagonal bifurcation, mild LCx disease and severe prox RCA disease   ---   Evaluated for heart transplant but due to elevated DSAs and decision was made to proceed with LVAD likely as destination therapy  ---   CABG was considered but decision was made for medical therapy in light of his low EF and cardiac MRI viability study showed nonviable myocardium in LAD territory    ---   Had low flow alram on 8/6/22, work up was negative, no clear etiology found   ---   CTA 8/20 normal flows   ---    "TAVARES 8/20 underfilling of LV and seed decreased 5300 -> 5200 and no alarms since  ---   Continue hydralazine 37.5 tid and lisinopril 2.5 mg daily for afterload reduction  ---   Continue ASA, digoxin and atorvastatin  ---   Pt to wear abd binder and compression stocking  ---   Goal MAP to be below 80   ---   On coumadin for RV thrombus w/ INR goal 2-3.  INR is 2.26 today   ---   Needs EP evaluation for increasing capture threshold of RV lead   ---   LVAD coordinator # [phone] or [phone]    Acute hypoxic respiratory failure   ---   Intubated 8/7 for airway protection due to severe epistaxis  ---   Extubated 8/8 w/o any difficulties  ---   Has underlying mild-moderate Emphysema  ---   Seen by pulmonary consult service  ---   Continue Breao Ellipta, prn inhalers, PFT as able     ---   Iatrogenic apical PTX, resolved    ---   Respiratory failure resolved    PE  H/o antiphospholipid antibody syndrome  ---   Continue Coumadin, pharmacy is dosing  ---   INR is 2.26 today     Epistaxis  ---   Due to Coumadin + ASA  ---   has a history of nasal perforation and underwent elective procedure in [place]  ---   He required intubation for airway protection   ---   Seen by ENT consult service and underwent catheterization   ---   Patient removed his own nasal packing  ---   Extubated w/o any difficulties  ---   Epistaxis resolved  ---   Per ENT \" If bleeding recurs, spray Afrin (3 sprays) and hold firm digital pressure over the soft part of the nose for 20 minutes continuously.  Nasal clips are ineffective and should not be used in place of digital pressure.  If bleeding continues despite these measures, repeat. If bleeding continues or is severe please contact ENT\"     E.faecalis bacteremia  ---   Blood culture from PICC line (8/2/22), 1 out 2 bottles grew staph epi (probably contaminant) and the other bottle grew E. Faecalis.    ---   NGTD from subsequent blood cultures   ---   He received Cefepime and Vancomycin, 8/7 - " 8/12/22  ---   Treated for oral thrush for 14 days   ---   Has a h/o COVID-19 in so far 1/2022 ( needed intubation )        Hematemesis  Black tarry stool   ---   He was seen by GI and was felt was due to epistaxis and not GI bleed   ---   on PPI   ---   Congestive hepatopathy, shocked liver on presentation, now LFT back to normal    ---   Dysphasia post intubation, resolved and now on regular diet     Extremity and facial numbness 8/22/22  ---   Had a non-focal neurologic exam  ---   CT head without contrast was negative for acute intracranial pathology  ---   Resolved    Delirium   Depression  Insomnia  ---   CT head negative for acute intracranial pathology 8/7/22   ---   MS has been clear past couple days  ---   Continue sertraline   ---   Continue quetiapine 150 mg at night and 25 mg bid     ---   Continue melatonin       Acute blood loss anemia  superimposed on BRENDA  ---   Hgb stable at 10 g  ---   Not on Fe supplement     SLE  ---   Continue PTA hydroxychloroquine and cellcept 1500 mg po bid     Severe Malnutrition  ---   Based on nutrition assessment   ---   Nutritional supplement being provided            Diet: Room Service  Combination Diet Regular Diet; Thin Liquids (level 0)  Snacks/Supplements Adult: Ensure Enlive; With Meals    DVT Prophylaxis: warfarin   Fitzgerald Catheter: Not present  Central Lines: None    Cardiac Monitoring: None  Code Status: Full Code      Disposition Plan   TBD            Dave Gallardo MD  Hospitalist Service  [facility]  Securely message with the Vocera Web Console (learn more here)  Text page via [employer] Paging/Directory           ____________________________________________________________________    Interval History    No complaints this morning.    Uneventful night.    Doing well.  Endorses occasional lightheadedness and dizziness    Data reviewed today: I reviewed all medications, new labs and imaging results over the last 24  hours.  Physical Exam   Vital Signs: Temp: 97.2  F (36.2  C) Temp src: Oral   Pulse: 84   Resp: 16 SpO2: 95 % O2 Device: None (Room air)    Weight: 130 lbs 14.4 oz  General: aao x 3, NAD.  HEENT:  NC/AT, neck supple  CVS:  NL s 1 and s2, no m/r/g. LVAD sound audible  Lungs:  Bibasilar rhonchi, no wheezing   Abd:  Soft, + bs, NT, no rebound or gaurding, no fluid shift.  Ext:  No c/c.  Lymph:  No edema.  Neuro:  Nonfocal.  Musculoskeletal: No calf tenderness to palpation.    Skin:  No rash.  Psychiatry:  Mood and affect appropriate.      Data   Recent Labs   Lab 08/26/22  0642 08/25/22  0603 08/24/22  0543 08/23/22  0635 08/22/22  0653 08/21/22  0510 08/20/22  0520   WBC  --   --   --   --  6.9 6.0 6.1   HGB  --   --   --   --  10.1* 9.5* 9.8*   MCV  --   --   --   --  94 96 95   PLT  --   --   --   --  272 243 262   INR 2.26* 2.29* 2.47*   < > 2.47* 2.44* 2.59*   NA  --   --   --   --  137 136 139   POTASSIUM  --   --   --   --  4.1 4.1 4.4   CHLORIDE  --   --   --   --  108 104 106   CO2  --   --   --   --  26 24 24   BUN  --   --   --   --  10 12.1 11.4   CR  --   --   --   --  0.58* 0.53* 0.60*   ANIONGAP  --   --   --   --  3 8 9   ELDA  --   --   --   --  9.5 9.1 9.4   GLC  --   --   --   --  97 90 88   ALBUMIN  --   --   --   --   --  3.2* 3.4*   PROTTOTAL  --   --   --   --   --  6.7 7.0   BILITOTAL  --   --   --   --   --  0.3 0.4   ALKPHOS  --   --   --   --   --  146* 142*   ALT  --   --   --   --   --  13 12   AST  --   --   --   --   --  24 25    < > = values in this interval not displayed.     No results found for this or any previous visit (from the past 24 hour(s)).

## 2022-08-26 NOTE — PLAN OF CARE
Discharge Planner Post-Acute Rehab OT:     Discharge Plan: to [facility] with son     Precautions: fall, strernal precautions, LVAD, lucero hose and abdominal binder to be worn due to low BP    Current Status:  ADLs:    Mobility: SBA with walker for short distance    Grooming: setup, close SBA standing    Dressing: setup for UB dressing,set up for LB dressing    Bathing: no showers, UB sponge bath set up assist and close SBA standing, pt declined LB washing    Toileting: SBA for transfer and toileting  IADLs: son can assist  Vision/Cognition: wears glasses, cognition to be assessed as needed    Assessment:  Working on functional mobility with and without the walker in the kitchen and in the room. Pt needs cues for walker use at times but without the walker he had a slight LOB when turning. Pt limited by fatigue, and at the end of the session some dizziness. Pt making progress.      Other Barriers to Discharge (DME, Family Training, etc): dizziness

## 2022-08-26 NOTE — PLAN OF CARE
Discharge Planner Post-Acute Rehab PT:      Discharge Plan: [facility] with son. Home care PT, pending insurance.     Precautions: Sternal, falls     Current Status:  Bed Mobility: IND  Transfer: SBA with FWW  Gait: up to 200 ft, SBA with FWW  Stairs: 4 stairs (6''), CGA  Balance: David on 8/26: 37/56  2MWT on 8/26: 127 ft with FWW     Assessment: Despite decent functional strength and stability, pt is not yet safe for mod I in room due to impulsivity and decreased insight into deficits, specifically moving/turning fast creating LOB, as well as attempting to mobilize without use of walker, despite education on falls risk.     Other Barriers to Discharge (DME, Family Training, etc):   Family training to be scheduled.  Anticipate 4WW to be issued.

## 2022-08-26 NOTE — PROGRESS NOTES
[facility]   Acute Rehabilitation Unit  Daily progress note    INTERVAL HISTORY  Dandy Sands was seen and examined at bedside.  Per RN chart check patient had a good night of sleep, LVAD parameters were within normal limits with MAP 78.  He endorses good night of sleep.  States bowel movements are daily and without  constipation.  Has occasional dizziness when he wakes up, or sits up in bed.  Has ongoing training on LVAD.  He denies dizziness currently, fever, chills, CP, SOB, abdominal discomfort, or new W/N/T. LBM 8/25 continent.    10 point ROS is negative except what is in HPI.    Functionally   OT   ADLs:    Mobility: SBA with walker for short distance    Grooming: setup, close SBA standing    Dressing: setup for UB dressing,set up for LB dressing    Bathing: no showers, UB sponge bath set up assist and close SBA standing, pt declined LB washing    Toileting: SBA for transfer and toileting  IADLs: son can assist  Vision/Cognition: wears glasses, cognition to be assessed as needed    Today's Updates  -INR today 2.26       MEDICATIONS    - Medication Assessment Program - Rehab Services   Does not apply See Admin Instructions     acetaminophen  975 mg Oral Q8H     aspirin  81 mg Oral Daily     atorvastatin  40 mg Oral QPM     digoxin  125 mcg Oral Daily     fluticasone-vilanterol  1 puff Inhalation Daily     hydrALAZINE  37.5 mg Oral TID     hydroxychloroquine  200 mg Oral BID     lisinopril  2.5 mg Oral Daily     melatonin  10 mg Oral At Bedtime     multivitamin w/minerals  1 tablet Oral Daily     mycophenolate  1,500 mg Oral BID     pantoprazole  40 mg Oral QAM AC     QUEtiapine  150 mg Oral or Feeding Tube At Bedtime     QUEtiapine  25 mg Oral BID     sertraline  100 mg Oral Daily     sodium chloride  2 spray Both Nostrils Q4H While awake     warfarin ANTICOAGULANT  6 mg Oral ONCE at 18:00     Warfarin Therapy Reminder  1 each Oral See Admin Instructions     "    LORazepam, naloxone **OR** naloxone **OR** naloxone **OR** naloxone, ondansetron, oxyCODONE, polyethylene glycol, QUEtiapine, senna-docusate     PHYSICAL EXAM  /74 (BP Location: Left arm)   Pulse 84   Temp 97.2  F (36.2  C) (Oral)   Resp 16   Ht 1.702 m (5' 7\")   Wt 59.4 kg (130 lb 14.4 oz)   SpO2 95%   BMI 20.50 kg/m    General: Laying in bed, not in acute distress  HEENT: NC/NT, EOMI, moist mucous membranes  Pulmonary: Clear bilateral airways on auscultation.  Cardiovascular: Notable LVAD running and  humming.  Abdominal: Non-tender, non-distended, bowel sounds present.  Extremities: warm, well perfused, no edema in bilateral lower extremities, no tenderness in calves, unchanged power 5/5 bilateral upper and lower extremities.  Neuro/MSK: Alert and oriented, face symmetric. Responds well during team rounds discussion.    LABS  Recent Results (from the past 24 hour(s))   INR    Collection Time: 08/26/22  6:42 AM   Result Value Ref Range    INR 2.26 (H) 0.85 - 1.15   Extra Green Top (Lithium Heparin) Tube    Collection Time: 08/26/22  6:42 AM   Result Value Ref Range    Hold Specimen JIC    Extra Purple Top Tube    Collection Time: 08/26/22  6:42 AM   Result Value Ref Range    Hold Specimen JIC        Rehabilitation - continue comprehensive acute inpatient rehabilitation program with multidisciplinary approach including therapies, rehab nursing, and physiatry following. See interval history for updates.      ASSESSMENT AND PLAN  Dandy Sands is a 61 year old right hand dominant male with past medical history of SLE, antiphospholipid syndrome, history pulmonary embolism (on anticoagulation with warfarin), HFrEF due to IMC, HDL. He initially presented to [facility] on 6/13 due to nausea, vomiting, abdominal bloating and transferred to [facility] for admission on 6/17/2022 for decompensated heart failure 2/2 cardiogenic shock c/b multiorgan failure requiring IABP, is s/p HM 3 LVAD on 7/8/2022. Stay " was also c/b RV, had 29F Protek duo via RIJ, RVAD removed 7/21, hemopericardium requiring repeat washout, chest was closed 7/13. Other complication epistaxis on 8/6 ENT. Patient has h/o nasal perforation that required elective procedure (in [place]) which was postponed due to LVAD insertion.  He required intubation 8/7 for airway protection, was extubated 8/8.  Nasal pack removed 8/9.     Admission to acute inpatient rehab: Cardiac; s/p HM3 LVAD.    Impairment group code: 09        1. PT, OT 90 minutes of each on a daily basis, in addition to rehab nursing and close management of physiatrist.       2. Impairment of ADL's: Impairment in strength, endurance, and ROM resulting in functional limitations in patient's ability to perform ADLs.     3. Impairment of mobility:  Impairment in strength, endurance, and ROM resulting in functional limitations in patient's ability to perform functional mobility.     4. Impairment of cognition/language/swallow:  N/A     5. Medical Conditions     #HFrEF 2/2 ICM s/p HM3 LVAD in setting of cardiogenic shock (SCAI D)  #RV failure s/p Protek duo temporary RVAD s/p decannulation 7/21  #RV thrombus  #Post chest closure hemopericardium s/p repeat washout (7/12)  #PMH CAD s/p PCI to LAD (2005)  #S/p CRT-D (2006)  Patient with ICM s/p HM3 LVAD 7/8/22 2/2 cardiogenic shock requiring MCS with IABP as prior to HM (initially evaluated for heart transplant, however noted to have substantially elevated DSAs during course of care, so decision made to proceed with LVAD given patient was already on temporary MCS). Intraoperative course during LVAD placement was c/b RVF resulting in cardiogenic shock requiring high dose pressors necessitating temporary RVAD support. Initial post-op course c/b hemopericardium requiring repeat washout with chest left open, with good recovery extubation and decannulation on 7/21. Patient tolerating hemodynamics and end organ standpoint well. He has had intermittent  low flow alarms with high PI, no power spike, and a closed aortic valve on TTE. TTE also showed underfilling LV which could explain his low flow alarms d/t suckdown event so his LVAD speed was reduced to 5200. Patient did have a low flow alarm on 8/20 @ 1145 (PI 8-9 and not hypotensive); CTA chest done which showed that the LVAD outflow tract is widely patent. Patient is euvolemic on exam, but at times has had orthostasis symptoms; patient encouraged to stay hydrated within his fluid/diet restriction guidelines (no more than 2L/day of fluid intake and no more than 3g of Na per day in dietary intake).  P:  LVAD:  - interrogation showed 1 low flow alarm in last 24 hours as described above  - Is euvolemic 8/21  - Diuretics: Not needed at this time; monitor volume status and weight and consider starting low dose Lasix if he gains weight or starts showing signs of hypervolemia  - Afterload reduction: MAP goal 65-80; Hydralazine 37.5 TID + Lisinopril 2.5 qday  - Statin: Atorvastatin 40mg  - BB: Holding in setting of recent cardiogenic shock and orthostatic symptoms, consider restarting outpatient  - AA: Holding in setting of recent cardiogenic shock and orthostatic symptoms, consider restarting outpatient  - SGLTi: As outpatient, has been held due to immunosuppression he is on for his SLE.  - Warfarin for INR goal 2-3  - ASA 81 mg qday     #Epistaxis, resolved  #Intubated due to Concern for airway protection (8/7)-Extubated (8/8)  #Mild-moderate emphysema  #History of COVID-19  #Iatrogenic R apical PTX  H/o nasal perforation  For elective procedure (in [place]), postponed 2/2 LVAD insertion. Epistaxis not fixed by packing overnight on 8/6 and pt continued to bleed. Intubated for airway protection. Controlled at bedside by ENT s/p cautery and packing. Extubated 8/8. Had Cefepime/Vanc empirically for broad coverage for PNA (8/7-8/12). Patient removed his own nasal packing overnight on 8/9. ENT no longer following.     - C/t Ayr nasal saline gel at bedtime + nasal saline q4H while awake 8/24  - Continue as needed inhalers, PFT as able  --- Breo Ellipta discontinued 8/25.  Patient not using.  -  followup with his ENT as OP (see below)     #Insomnia/Delirium, resolved  Some concern that he had a fall overnight on 8/6. Discussed with nursing staff and he did NOT have a fall, although he did try to get out of bed. CT head was obtained (8/7) and it was unremarkable. Patient had some issues with delirium and insomnia during hospitalization and psychiatry was consulted who recommended  - Sertraline and Quetiapine per psych recs     # ? Orthostatic  Noted to be dizzy with therapy.  Has occasional hypotension with SBP high 80s.  May benefit from compressions.  -Compression socks and, abdominal binder 8/22    #GERD  #Congestive hepatopathy in the setting of cardiac insuffiency - Resolved  #Hematemesis / oral mucosal bleeding -resolved    #Dysphagia  GI consulted 7/31 for black tarry stools and a possible GI bleed, however it is more likely swallowed blood from epistaxis that patient previously had. GI did not recommend an upper endoscopy. Recurrence of bloody stools likely due to epistaxis which have resolved. Hemoglobin was monitored and continued to be stable throughout the rest of the hospitalization. Cleared for regular diet by SLP.     #Anemia due to blood loss from Epistaxis-Resolved  #History of DVT and PE   #Antiphospholipid antibody syndrome  #History of iron deficiency anemia  As noted above, likely related to epistaxis. After above interventions were complete and management was initiated per ENT recs, hemoglobin remained stable and no further evidence of bleeding.  - Saline (OCEAN) nasal sprays Q4H when awake.     #SLE  - PTA meds: hydroxychloroquine 200mg, resumed 7/16  - Restart PTA Cellcept at discharge     #Oral thrush, resolved  Completed 14 days of nystatin        6. Adjustment to disability:  Clinical psychology to brigid  and treat as needed  7. FEN:  Regular with thins  8. Bowel: As needed MiraLAX and Senokot  9. Bladder: Pending review  10. DVT Prophylaxis: Warfarin goal 2-3 for LVAD, daily INR, and ASA 81  11. GI Prophylaxis: Pantoprazole 40 mg daily  12. Code: Full  13. Disposition: Home with homecare and Home with outpatient therapy  14. ELOS: 12 to 14 days.  15. Rehab prognosis: Fair   16. Follow up Appointments on Discharge:   -PCP follow-up 2 to 3 weeks : Requires CBC BMP  -Follow-up with [facility] at next available appointment and follow-up with your cardiologist in the next few months at next available appointment   - Follow-up with psychiatry post discharge  - Followup with his ENT in [place] to rediscuss possible procedure for his nasal perforation vs. conservative management       Patient seen and discussed with Dr. Mathew, PM&R Staff Physician    Padmini Linda MD   Resident Physician, PGY-2   Physical Medicine and Rehabilitation  [facility]

## 2022-08-27 ENCOUNTER — APPOINTMENT (OUTPATIENT)
Dept: OCCUPATIONAL THERAPY | Facility: CLINIC | Age: 62
DRG: 949 | End: 2022-08-27
Attending: PHYSICAL MEDICINE & REHABILITATION
Payer: COMMERCIAL

## 2022-08-27 LAB
ANION GAP SERPL CALCULATED.3IONS-SCNC: 5 MMOL/L (ref 3–14)
BUN SERPL-MCNC: 11 MG/DL (ref 7–30)
CALCIUM SERPL-MCNC: 9.2 MG/DL (ref 8.5–10.1)
CHLORIDE BLD-SCNC: 111 MMOL/L (ref 94–109)
CO2 SERPL-SCNC: 23 MMOL/L (ref 20–32)
CREAT SERPL-MCNC: 0.59 MG/DL (ref 0.66–1.25)
ERYTHROCYTE [DISTWIDTH] IN BLOOD BY AUTOMATED COUNT: 16.8 % (ref 10–15)
GFR SERPL CREATININE-BSD FRML MDRD: >90 ML/MIN/1.73M2
GLUCOSE BLD-MCNC: 101 MG/DL (ref 70–99)
HCT VFR BLD AUTO: 33 % (ref 40–53)
HGB BLD-MCNC: 10.5 G/DL (ref 13.3–17.7)
INR PPP: 2.19 (ref 0.85–1.15)
MAGNESIUM SERPL-MCNC: 1.9 MG/DL (ref 1.6–2.3)
MCH RBC QN AUTO: 29.2 PG (ref 26.5–33)
MCHC RBC AUTO-ENTMCNC: 31.8 G/DL (ref 31.5–36.5)
MCV RBC AUTO: 92 FL (ref 78–100)
PLATELET # BLD AUTO: 289 10E3/UL (ref 150–450)
POTASSIUM BLD-SCNC: 3.8 MMOL/L (ref 3.4–5.3)
RBC # BLD AUTO: 3.6 10E6/UL (ref 4.4–5.9)
SODIUM SERPL-SCNC: 139 MMOL/L (ref 133–144)
WBC # BLD AUTO: 5.6 10E3/UL (ref 4–11)

## 2022-08-27 PROCEDURE — 999N000127 HC STATISTIC PERIPHERAL IV START W US GUIDANCE

## 2022-08-27 PROCEDURE — 99233 SBSQ HOSP IP/OBS HIGH 50: CPT | Performed by: PHYSICAL MEDICINE & REHABILITATION

## 2022-08-27 PROCEDURE — 999N000040 HC STATISTIC CONSULT NO CHARGE VASC ACCESS

## 2022-08-27 PROCEDURE — 97535 SELF CARE MNGMENT TRAINING: CPT | Mod: GO

## 2022-08-27 PROCEDURE — 258N000003 HC RX IP 258 OP 636: Performed by: INTERNAL MEDICINE

## 2022-08-27 PROCEDURE — 85027 COMPLETE CBC AUTOMATED: CPT | Performed by: INTERNAL MEDICINE

## 2022-08-27 PROCEDURE — 250N000013 HC RX MED GY IP 250 OP 250 PS 637: Performed by: PHYSICAL MEDICINE & REHABILITATION

## 2022-08-27 PROCEDURE — 85610 PROTHROMBIN TIME: CPT

## 2022-08-27 PROCEDURE — 83735 ASSAY OF MAGNESIUM: CPT | Performed by: INTERNAL MEDICINE

## 2022-08-27 PROCEDURE — 36415 COLL VENOUS BLD VENIPUNCTURE: CPT | Performed by: INTERNAL MEDICINE

## 2022-08-27 PROCEDURE — 99232 SBSQ HOSP IP/OBS MODERATE 35: CPT | Performed by: INTERNAL MEDICINE

## 2022-08-27 PROCEDURE — 250N000011 HC RX IP 250 OP 636: Performed by: PHYSICAL MEDICINE & REHABILITATION

## 2022-08-27 PROCEDURE — 80048 BASIC METABOLIC PNL TOTAL CA: CPT | Performed by: INTERNAL MEDICINE

## 2022-08-27 PROCEDURE — 250N000013 HC RX MED GY IP 250 OP 250 PS 637: Performed by: INTERNAL MEDICINE

## 2022-08-27 PROCEDURE — 250N000013 HC RX MED GY IP 250 OP 250 PS 637

## 2022-08-27 PROCEDURE — 128N000003 HC R&B REHAB

## 2022-08-27 PROCEDURE — 250N000012 HC RX MED GY IP 250 OP 636 PS 637

## 2022-08-27 RX ORDER — ONDANSETRON 4 MG/1
4 TABLET, ORALLY DISINTEGRATING ORAL EVERY 6 HOURS PRN
Status: DISCONTINUED | OUTPATIENT
Start: 2022-08-27 | End: 2022-09-04 | Stop reason: HOSPADM

## 2022-08-27 RX ORDER — WARFARIN SODIUM 7.5 MG/1
7.5 TABLET ORAL
Status: COMPLETED | OUTPATIENT
Start: 2022-08-27 | End: 2022-08-27

## 2022-08-27 RX ADMIN — QUETIAPINE FUMARATE 25 MG: 25 TABLET ORAL at 15:54

## 2022-08-27 RX ADMIN — Medication 37.5 MG: at 08:15

## 2022-08-27 RX ADMIN — SALINE NASAL SPRAY 2 SPRAY: 1.5 SOLUTION NASAL at 17:54

## 2022-08-27 RX ADMIN — HYDROXYCHLOROQUINE SULFATE 200 MG: 200 TABLET, FILM COATED ORAL at 20:44

## 2022-08-27 RX ADMIN — Medication 37.5 MG: at 13:52

## 2022-08-27 RX ADMIN — SALINE NASAL SPRAY 2 SPRAY: 1.5 SOLUTION NASAL at 21:01

## 2022-08-27 RX ADMIN — Medication 10 MG: at 21:05

## 2022-08-27 RX ADMIN — WARFARIN SODIUM 7.5 MG: 7.5 TABLET ORAL at 17:54

## 2022-08-27 RX ADMIN — DIGOXIN 125 MCG: 125 TABLET ORAL at 08:15

## 2022-08-27 RX ADMIN — SERTRALINE HYDROCHLORIDE 100 MG: 100 TABLET ORAL at 08:16

## 2022-08-27 RX ADMIN — ONDANSETRON 4 MG: 4 TABLET, ORALLY DISINTEGRATING ORAL at 10:15

## 2022-08-27 RX ADMIN — MULTIPLE VITAMINS W/ MINERALS TAB 1 TABLET: TAB at 08:15

## 2022-08-27 RX ADMIN — PANTOPRAZOLE SODIUM 40 MG: 40 TABLET, DELAYED RELEASE ORAL at 08:15

## 2022-08-27 RX ADMIN — Medication 37.5 MG: at 20:57

## 2022-08-27 RX ADMIN — FLUTICASONE FUROATE AND VILANTEROL TRIFENATATE 1 PUFF: 100; 25 POWDER RESPIRATORY (INHALATION) at 08:15

## 2022-08-27 RX ADMIN — MYCOPHENOLATE MOFETIL 1500 MG: 500 TABLET, FILM COATED ORAL at 08:16

## 2022-08-27 RX ADMIN — QUETIAPINE FUMARATE 25 MG: 25 TABLET ORAL at 08:15

## 2022-08-27 RX ADMIN — ACETAMINOPHEN 975 MG: 325 TABLET, FILM COATED ORAL at 13:52

## 2022-08-27 RX ADMIN — ACETAMINOPHEN 975 MG: 325 TABLET, FILM COATED ORAL at 20:43

## 2022-08-27 RX ADMIN — OXYCODONE HYDROCHLORIDE 5 MG: 5 TABLET ORAL at 21:06

## 2022-08-27 RX ADMIN — HYDROXYCHLOROQUINE SULFATE 200 MG: 200 TABLET, FILM COATED ORAL at 08:15

## 2022-08-27 RX ADMIN — QUETIAPINE FUMARATE 150 MG: 50 TABLET ORAL at 21:05

## 2022-08-27 RX ADMIN — LISINOPRIL 2.5 MG: 2.5 TABLET ORAL at 08:16

## 2022-08-27 RX ADMIN — SODIUM CHLORIDE, POTASSIUM CHLORIDE, SODIUM LACTATE AND CALCIUM CHLORIDE 500 ML: 600; 310; 30; 20 INJECTION, SOLUTION INTRAVENOUS at 13:38

## 2022-08-27 RX ADMIN — MYCOPHENOLATE MOFETIL 1500 MG: 500 TABLET, FILM COATED ORAL at 20:43

## 2022-08-27 RX ADMIN — ASPIRIN 81 MG: 81 TABLET, CHEWABLE ORAL at 08:16

## 2022-08-27 RX ADMIN — ACETAMINOPHEN 975 MG: 325 TABLET, FILM COATED ORAL at 05:20

## 2022-08-27 RX ADMIN — ATORVASTATIN CALCIUM 40 MG: 40 TABLET, FILM COATED ORAL at 20:44

## 2022-08-27 ASSESSMENT — ACTIVITIES OF DAILY LIVING (ADL)
ADLS_ACUITY_SCORE: 26

## 2022-08-27 NOTE — PROGRESS NOTES
Pt's LVAD pulse flow dropped to 1.8 per therapy, pt was asymptomatic. After a couple minutes flow went back up to 3. Stockings placed on pt, pt encouraged to drink more fluids. Dr. Mathew updated.

## 2022-08-27 NOTE — PLAN OF CARE
Goal Outcome Evaluation:    Plan of Care Reviewed With: patient     Overall Patient Progress: no change    Outcome Evaluation: no change in pt progress on shift.    Pt A&Ox4. CGA with walker. Cont of bladder on shift, using urinal at bedside. Pt c/o mild back pain, scheduled tylenol given with relief. Pt denies SOB, headache, or new numbness/tingling. Pt c/o nausea, PRN zofran given with relief. No new skin problems. Sternal incision JOSE ENRIQUE. LVAD present. Pulse index increased to 10 and pulse flow decreased to 2.7 with therapy, pt became dizzy.  Updated, new orders for fluids. R PIV placed, fluids started per orders. Pt states that he is feeling better, dizziness subsided. MAP WDL. Pt encouraged to drink more fluids and eat meals. Call light in reach, alarms on, continue POC.

## 2022-08-27 NOTE — PROGRESS NOTES
[facility]    Medicine Progress Note - Hospitalist Service    Date of Admission:  8/21/2022    Assessment & Plan        Dandy aSnds is a 60 yo male with medical history of SLE, antiphospholipid antibody syndrome, PE on anticoagulation with warfarin, HFrEF due to ICM and HDL.  He initially presented to [facility] in [place] on 6/13/22 with nausea, vomiting and abd bloating.  Transferred to [facility] on 6/17/22 w/ decompensated heart failure, cardiogenic shock and multiorgan failure.  He  required IABP and then underwent HM3 LVAD placement on 7/8/22 by Dr Zamora.  Surgery was complicated with RV failure requiring 29F Protek duo via RIJ RVAD placement ( removed 7/21/22), hemopericardium requiring repeat washout, chest was closed 7/13/22.  Other post LVAD placement complication included epistaxis requiring intubation 8/7/22 for airway protection.  Pt removed his own nasal packing.  He was eventually extubated.  He was transferred to acute inpatient rehab 8/21/2022 for further rehab and cares       HFrEF 2/2 ICM s/p HM3 LVAD in setting of cardiogenic shock   RV failure s/p Protek duo temporary RVAD, s/p decannulation 7/21  RV thrombus  Post chest closure hemopericardium, s/p repeat washout (7/12)  H/o CAD s/p PCI to LAD (2005), S/p CRT-D (2006), NTEMI 2007  ---   Coronary angio 5/2022 showed severe ostial LAD disease with instent restenosis, mid LAD involved diagonal bifurcation, mild LCx disease and severe prox RCA disease   ---   Evaluated for heart transplant but due to elevated DSAs, decision was made to proceed with LVAD likely as destination therapy  ---   CABG was considered but decision was made for medical therapy in light of his low EF and cardiac MRI viability study showed nonviable myocardium in LAD territory    ---   TAVARES 8/20 reveled EF 10-15%  ---   Continue to have intermittent low flow alarm but previous w/u were negative   ---   " Encouraged pt to increase his oral intake  ---    ml over 4 hrs ordered  ---   Continue hydralazine 37.5 tid and lisinopril 2.5 mg daily for afterload reduction  ---   Continue ASA, digoxin and atorvastatin  ---   Pt to wear abd binder and compression stocking  ---   Goal MAP to be below 80   ---   On coumadin for RV thrombus w/ INR goal 2-3.  INR is 2.19 today   ---   Needs EP evaluation for increasing capture threshold of RV lead   ---   LVAD coordinator # [phone] or [phone]    Acute hypoxic respiratory failure   ---   Intubated 8/7 for airway protection due to severe epistaxis  ---   Extubated 8/8 w/o any difficulties  ---   Has underlying mild-moderate Emphysema  ---   Seen by pulmonary consult service  ---   Continue Breao Ellipta, prn inhalers, PFT as able     ---   Iatrogenic apical PTX, resolved    ---   Respiratory failure resolved    PE  H/o antiphospholipid antibody syndrome  ---   Continue Coumadin, pharmacy is dosing  ---   INR is 2.19 today     Epistaxis  ---   Due to Coumadin + ASA  ---   Has a history of nasal perforation and underwent elective procedure in [place]  ---   He required intubation for airway protection   ---   Seen by ENT consult service and underwent catheterization   ---   Patient removed his own nasal packing  ---   Extubated w/o any difficulties  ---   Epistaxis resolved  ---   Per ENT \" If bleeding recurs, spray Afrin (3 sprays) and hold firm digital pressure over the soft part of the nose for 20 minutes continuously.  Nasal clips are ineffective and should not be used in place of digital pressure.  If bleeding continues despite these measures, repeat. If bleeding continues or is severe please contact ENT\"     E.faecalis bacteremia  ---   Blood culture from PICC line (8/2/22), 1 out 2 bottles grew staph epi (probably contaminant) and the other bottle grew E. Faecalis.    ---   NGTD from subsequent blood cultures   ---   He received Cefepime and Vancomycin, 8/7 - " 8/12/22  ---   Treated for oral thrush for 14 days   ---   Has a h/o COVID-19 infection 1/2022 ( needed intubation )        Hematemesis  Black tarry stool   ---   He was seen by GI and was felt was due to epistaxis and not GI bleed   ---   on PPI   ---   Congestive hepatopathy, shocked liver on presentation, now LFT back to normal    ---   Dysphasia post intubation, resolved and now on regular diet     Extremity and facial numbness 8/22/22  ---   Had a non-focal neurologic exam  ---   CT head without contrast was negative for acute intracranial pathology  ---   Resolved    Delirium   Depression  Insomnia  ---   CT head negative for acute intracranial pathology 8/7/22   ---   MS has been clear past couple days  ---   Continue sertraline   ---   Continue quetiapine 150 mg at night and 25 mg bid     ---   Continue melatonin       Acute blood loss anemia  superimposed on BRENDA  ---   Hgb stable at 10 g  ---   Not on Fe supplement     SLE  ---   Continue PTA hydroxychloroquine and cellcept 1500 mg po bid     Severe Malnutrition  ---   Based on nutrition assessment   ---   Nutritional supplement being provided            Diet: Room Service  Combination Diet Regular Diet; Thin Liquids (level 0)  Snacks/Supplements Adult: Ensure Enlive; With Meals    DVT Prophylaxis: warfarin   Fitzgerald Catheter: Not present  Central Lines: None    Cardiac Monitoring: None  Code Status: Full Code      Disposition Plan   TBD            Dave Gallardo MD  Hospitalist Service  [facility]  Securely message with the Vocera Web Console (learn more here)  Text page via [employer] Paging/Directory           ____________________________________________________________________    Interval History    Pt with intermittent LVAD low flow alarm   He endorses intermittent lightheadedness and dizziness  Oral intake not adequate  Appears dehydrated    Data reviewed today: I reviewed all medications, new labs and imaging  results over the last 24 hours.  Physical Exam   Vital Signs: Temp: (!) 96  F (35.6  C) Temp src: Oral   Pulse: 79   Resp: 12 SpO2: 95 % O2 Device: None (Room air)    Weight: 130 lbs 14.4 oz  General: aao x 3, NAD.  HEENT:  NC/AT, neck supple  CVS:  NL s 1 and s2, no m/r/g. LVAD sound audible  Lungs:  Bibasilar rhonchi, no wheezing   Abd:  Soft, + bs, NT, no rebound or gaurding, no fluid shift.  Ext:  No c/c.  Lymph:  No edema.  Neuro:  Nonfocal.  Musculoskeletal: No calf tenderness to palpation.    Skin:  No rash.  Psychiatry:  Mood and affect appropriate.      Data   Recent Labs   Lab 08/27/22  0816 08/26/22  0642 08/25/22  0603 08/23/22  0635 08/22/22  0653 08/21/22  0510   WBC 5.6  --   --   --  6.9 6.0   HGB 10.5*  --   --   --  10.1* 9.5*   MCV 92  --   --   --  94 96     --   --   --  272 243   INR 2.19* 2.26* 2.29*   < > 2.47* 2.44*     --   --   --  137 136   POTASSIUM 3.8  --   --   --  4.1 4.1   CHLORIDE 111*  --   --   --  108 104   CO2 23  --   --   --  26 24   BUN 11  --   --   --  10 12.1   CR 0.59*  --   --   --  0.58* 0.53*   ANIONGAP 5  --   --   --  3 8   ELDA 9.2  --   --   --  9.5 9.1   *  --   --   --  97 90   ALBUMIN  --   --   --   --   --  3.2*   PROTTOTAL  --   --   --   --   --  6.7   BILITOTAL  --   --   --   --   --  0.3   ALKPHOS  --   --   --   --   --  146*   ALT  --   --   --   --   --  13   AST  --   --   --   --   --  24    < > = values in this interval not displayed.     No results found for this or any previous visit (from the past 24 hour(s)).

## 2022-08-27 NOTE — PLAN OF CARE
FOCUS/GOAL  Medical management    ASSESSMENT, INTERVENTIONS AND CONTINUING PLAN FOR GOAL:  Patient slept well. LVAD parameters WNL. MAP 70. Voided  Using urinal. Meds ready for MAP program. Denied pain, received scheduled medications. Will continue with POC.  Goal Outcome Evaluation:    Plan of Care Reviewed With: other (see comments)     Overall Patient Progress: no change    Outcome Evaluation: No change this shift.

## 2022-08-27 NOTE — PLAN OF CARE
Goal Outcome Evaluation:    Overall Patient Progress: no change    Outcome Evaluation: Continues to have lower back pain, complained once, scheduled Tylenol and PRN oxycodone given once, effective. Continues to be continent of bowel and bladder, used the toilet. Assist of 1 with gait belt and walker. LVAD dresing changed. No skin issues r/t moisture or pressure. Used call light approrpiatelya nd waited for assistance. On MAP, called once and able to pick out meds correctly but needs encouragement/reminder to conssitently call for med administration.

## 2022-08-27 NOTE — PROGRESS NOTES
[facility]   Acute Rehabilitation Unit  Daily progress note    INTERVAL HISTORY  Dandy Sands was seen and examined at bedside.  The patient had a good night of sleep, LVAD parameters were off today, and evaliuated by Dr. Gallardo. Encouraged to drink liquids, use binder and stockings.     States bowel movements are daily and without  constipation.  Has occasional dizziness when he wakes up, or sits up in bed.  Has ongoing training on LVAD.  He denies dizziness currently, fever, chills, CP, SOB, abdominal discomfort, or new W/N/T.     10 point ROS is negative except what is in HPI.    Functionally   OT  ADLs:    Mobility: SBA with walker for short distance    Grooming: setup, close SBA standing    Dressing: setup for UB dressing, set up for LB dressing    Bathing: no showers, UB sponge bath set up assist and close SBA standing, pt declined LB washing    Toileting: SBA for transfer and toileting  IADLs: son can assist  Vision/Cognition: wears glasses, cognition to be assessed as needed     MEDICATIONS    - Medication Assessment Program - Rehab Services   Does not apply See Admin Instructions     acetaminophen  975 mg Oral Q8H     aspirin  81 mg Oral Daily     atorvastatin  40 mg Oral QPM     digoxin  125 mcg Oral Daily     fluticasone-vilanterol  1 puff Inhalation Daily     hydrALAZINE  37.5 mg Oral TID     hydroxychloroquine  200 mg Oral BID     lactated ringers  500 mL Intravenous Once     lisinopril  2.5 mg Oral Daily     melatonin  10 mg Oral At Bedtime     multivitamin w/minerals  1 tablet Oral Daily     mycophenolate  1,500 mg Oral BID     pantoprazole  40 mg Oral QAM AC     QUEtiapine  150 mg Oral or Feeding Tube At Bedtime     QUEtiapine  25 mg Oral BID     sertraline  100 mg Oral Daily     sodium chloride  2 spray Both Nostrils Q4H While awake     warfarin ANTICOAGULANT  7.5 mg Oral ONCE at 18:00     Warfarin Therapy Reminder  1 each Oral See Admin Instructions     "    LORazepam, naloxone **OR** naloxone **OR** naloxone **OR** naloxone, ondansetron, oxyCODONE, polyethylene glycol, QUEtiapine, senna-docusate     PHYSICAL EXAM  /74 (BP Location: Left arm)   Pulse 79   Temp (!) 96  F (35.6  C) (Oral)   Resp 12   Ht 1.702 m (5' 7\")   Wt 58.7 kg (129 lb 6.4 oz)   SpO2 95%   BMI 20.27 kg/m    General: Laying in bed, not in acute distress  HEENT: NC/NT, EOMI, moist mucous membranes  Pulmonary: Clear bilateral airways on auscultation.  Cardiovascular: Notable LVAD running and  humming.  Abdominal: Non-tender, non-distended, bowel sounds present.  Extremities: warm, well perfused, no edema in bilateral lower extremities, no tenderness in calves, unchanged power 5/5 bilateral upper and lower extremities.  Neuro/MSK: Alert and oriented, face symmetric. Responds well during team rounds discussion.    LABS  Recent Results (from the past 24 hour(s))   INR    Collection Time: 08/27/22  8:16 AM   Result Value Ref Range    INR 2.19 (H) 0.85 - 1.15   Basic metabolic panel    Collection Time: 08/27/22  8:16 AM   Result Value Ref Range    Sodium 139 133 - 144 mmol/L    Potassium 3.8 3.4 - 5.3 mmol/L    Chloride 111 (H) 94 - 109 mmol/L    Carbon Dioxide (CO2) 23 20 - 32 mmol/L    Anion Gap 5 3 - 14 mmol/L    Urea Nitrogen 11 7 - 30 mg/dL    Creatinine 0.59 (L) 0.66 - 1.25 mg/dL    Calcium 9.2 8.5 - 10.1 mg/dL    Glucose 101 (H) 70 - 99 mg/dL    GFR Estimate >90 >60 mL/min/1.73m2   Magnesium    Collection Time: 08/27/22  8:16 AM   Result Value Ref Range    Magnesium 1.9 1.6 - 2.3 mg/dL   CBC with platelets    Collection Time: 08/27/22  8:16 AM   Result Value Ref Range    WBC Count 5.6 4.0 - 11.0 10e3/uL    RBC Count 3.60 (L) 4.40 - 5.90 10e6/uL    Hemoglobin 10.5 (L) 13.3 - 17.7 g/dL    Hematocrit 33.0 (L) 40.0 - 53.0 %    MCV 92 78 - 100 fL    MCH 29.2 26.5 - 33.0 pg    MCHC 31.8 31.5 - 36.5 g/dL    RDW 16.8 (H) 10.0 - 15.0 %    Platelet Count 289 150 - 450 10e3/uL "       Rehabilitation - continue comprehensive acute inpatient rehabilitation program with multidisciplinary approach including therapies, rehab nursing, and physiatry following. See interval history for updates.      ASSESSMENT AND PLAN  Dandy Sands is a 61 year old right hand dominant male with past medical history of SLE, antiphospholipid syndrome, history pulmonary embolism (on anticoagulation with warfarin), HFrEF due to IMC, HDL. He initially presented to [facility] on 6/13 due to nausea, vomiting, abdominal bloating and transferred to [facility] for admission on 6/17/2022 for decompensated heart failure 2/2 cardiogenic shock c/b multiorgan failure requiring IABP, is s/p HM 3 LVAD on 7/8/2022. Stay was also c/b RV, had 29F Protek duo via RIJ, RVAD removed 7/21, hemopericardium requiring repeat washout, chest was closed 7/13. Other complication epistaxis on 8/6 ENT. Patient has h/o nasal perforation that required elective procedure (in [place]) which was postponed due to LVAD insertion.  He required intubation 8/7 for airway protection, was extubated 8/8.  Nasal pack removed 8/9.     Admission to acute inpatient rehab: Cardiac; s/p HM3 LVAD.    Impairment group code: 09        1. PT, OT 90 minutes of each on a daily basis, in addition to rehab nursing and close management of physiatrist.       2. Impairment of ADL's: Impairment in strength, endurance, and ROM resulting in functional limitations in patient's ability to perform ADLs.     3. Impairment of mobility:  Impairment in strength, endurance, and ROM resulting in functional limitations in patient's ability to perform functional mobility.     4. Impairment of cognition/language/swallow:  N/A     5. Medical Conditions     #HFrEF 2/2 ICM s/p HM3 LVAD in setting of cardiogenic shock (SCAI D)  #RV failure s/p Protek duo temporary RVAD s/p decannulation 7/21  #RV thrombus  #Post chest closure hemopericardium s/p repeat washout (7/12)  #PMH CAD s/p PCI to  LAD (2005)  #S/p CRT-D (2006)  Patient with ICM s/p HM3 LVAD 7/8/22 2/2 cardiogenic shock requiring MCS with IABP as prior to HM (initially evaluated for heart transplant, however noted to have substantially elevated DSAs during course of care, so decision made to proceed with LVAD given patient was already on temporary MCS). Intraoperative course during LVAD placement was c/b RVF resulting in cardiogenic shock requiring high dose pressors necessitating temporary RVAD support. Initial post-op course c/b hemopericardium requiring repeat washout with chest left open, with good recovery extubation and decannulation on 7/21. Patient tolerating hemodynamics and end organ standpoint well. He has had intermittent low flow alarms with high PI, no power spike, and a closed aortic valve on TTE. TTE also showed underfilling LV which could explain his low flow alarms d/t suckdown event so his LVAD speed was reduced to 5200. Patient did have a low flow alarm on 8/20 @ 1145 (PI 8-9 and not hypotensive); CTA chest done which showed that the LVAD outflow tract is widely patent. Patient is euvolemic on exam, but at times has had orthostasis symptoms; patient encouraged to stay hydrated within his fluid/diet restriction guidelines (no more than 2L/day of fluid intake and no more than 3g of Na per day in dietary intake).  P:  LVAD:  - interrogation showed 1 low flow alarm in last 24 hours as described above  - Is euvolemic 8/21  - Diuretics: Not needed at this time; monitor volume status and weight and consider starting low dose Lasix if he gains weight or starts showing signs of hypervolemia  - Afterload reduction: MAP goal 65-80; Hydralazine 37.5 TID + Lisinopril 2.5 qday  - Statin: Atorvastatin 40mg  - BB: Holding in setting of recent cardiogenic shock and orthostatic symptoms, consider restarting outpatient  - AA: Holding in setting of recent cardiogenic shock and orthostatic symptoms, consider restarting outpatient  - SGLTi: As  outpatient, has been held due to immunosuppression he is on for his SLE.  - Warfarin for INR goal 2-3  - ASA 81 mg qday     #Epistaxis, resolved  #Intubated due to Concern for airway protection (8/7)-Extubated (8/8)  #Mild-moderate emphysema  #History of COVID-19  #Iatrogenic R apical PTX  H/o nasal perforation  For elective procedure (in [place]), postponed 2/2 LVAD insertion. Epistaxis not fixed by packing overnight on 8/6 and pt continued to bleed. Intubated for airway protection. Controlled at bedside by ENT s/p cautery and packing. Extubated 8/8. Had Cefepime/Vanc empirically for broad coverage for PNA (8/7-8/12). Patient removed his own nasal packing overnight on 8/9. ENT no longer following.    - C/t Ayr nasal saline gel at bedtime + nasal saline q4H while awake 8/24  - Continue as needed inhalers, PFT as able  --- Breo Ellipta discontinued 8/25.  Patient not using.  -  followup with his ENT as OP (see below)     #Insomnia/Delirium, resolved  Some concern that he had a fall overnight on 8/6. Discussed with nursing staff and he did NOT have a fall, although he did try to get out of bed. CT head was obtained (8/7) and it was unremarkable. Patient had some issues with delirium and insomnia during hospitalization and psychiatry was consulted who recommended  - Sertraline and Quetiapine per psych recs     # ? Orthostatic  Noted to be dizzy with therapy.  Has occasional hypotension with SBP high 80s.  May benefit from compressions.  -Compression socks and, abdominal binder 8/22    #GERD  #Congestive hepatopathy in the setting of cardiac insuffiency - Resolved  #Hematemesis / oral mucosal bleeding -resolved    #Dysphagia  GI consulted 7/31 for black tarry stools and a possible GI bleed, however it is more likely swallowed blood from epistaxis that patient previously had. GI did not recommend an upper endoscopy. Recurrence of bloody stools likely due to epistaxis which have resolved. Hemoglobin was monitored and  continued to be stable throughout the rest of the hospitalization. Cleared for regular diet by SLP.     #Anemia due to blood loss from Epistaxis-Resolved  #History of DVT and PE   #Antiphospholipid antibody syndrome  #History of iron deficiency anemia  As noted above, likely related to epistaxis. After above interventions were complete and management was initiated per ENT recs, hemoglobin remained stable and no further evidence of bleeding.  - Saline (OCEAN) nasal sprays Q4H when awake.     #SLE  - PTA meds: hydroxychloroquine 200mg, resumed 7/16  - Restart PTA Cellcept at discharge     #Oral thrush, resolved  Completed 14 days of nystatin        6. Adjustment to disability:  Clinical psychology to eval and treat as needed  7. FEN:  Regular with thins  8. Bowel: As needed MiraLAX and Senokot  9. Bladder: Pending review  10. DVT Prophylaxis: Warfarin goal 2-3 for LVAD, daily INR, and ASA 81  11. GI Prophylaxis: Pantoprazole 40 mg daily  12. Code: Full  13. Disposition: Home with homecare and Home with outpatient therapy  14. ELOS: 12 to 14 days.  15. Rehab prognosis: Fair   16. Follow up Appointments on Discharge:   -PCP follow-up 2 to 3 weeks : Requires CBC BMP  -Follow-up with [facility] at next available appointment and follow-up with your cardiologist in the next few months at next available appointment   - Follow-up with psychiatry post discharge  - Followup with his ENT in [place] to rediscuss possible procedure for his nasal perforation vs. conservative management     Appreciate IM follow ups and assists.  Doing well. Continue cares and plans outlined.    Abner Mathew MD

## 2022-08-27 NOTE — PLAN OF CARE
Discharge Planner Post-Acute Rehab OT:     Discharge Plan: to [facility] with [facility]     Precautions: fall, strernal precautions, LVAD, lucero hose and abdominal binder to be worn due to low BP    Current Status:  ADLs:    Mobility: SBA with walker for short distance    Grooming: setup, close SBA standing    Dressing: setup for UB dressing, set up for LB dressing    Bathing: no showers, UB sponge bath set up assist and close SBA standing, pt declined LB washing    Toileting: SBA for transfer and toileting  IADLs: son can assist  Vision/Cognition: wears glasses, cognition to be assessed as needed    Assessment:  -10 minutes d/t LVAD flow and PI outside of parameters with RN and MD aware.  Session limited today by pt feeling light headed during functional mobility with LVAD numbers outside of parameters. Pt continues to require FWW for functional transfers and mobility d/t LOB without FWW. Pt limited by fatigue, impaired balance and dizziness.    Other Barriers to Discharge (DME, Family Training, etc): dizziness

## 2022-08-27 NOTE — CARE PLAN
PT: Per OT pt with LVAD changes this am, values decreased (see RN note for details)  Medical team is assessing, will re assess pt next session and proceed per team recommendations.    Ion España, PT  8/27/2022

## 2022-08-28 LAB
INR PPP: 2.28 (ref 0.85–1.15)
SARS-COV-2 RNA RESP QL NAA+PROBE: NEGATIVE

## 2022-08-28 PROCEDURE — 250N000012 HC RX MED GY IP 250 OP 636 PS 637

## 2022-08-28 PROCEDURE — 85610 PROTHROMBIN TIME: CPT

## 2022-08-28 PROCEDURE — U0005 INFEC AGEN DETEC AMPLI PROBE: HCPCS | Performed by: PHYSICAL MEDICINE & REHABILITATION

## 2022-08-28 PROCEDURE — 250N000013 HC RX MED GY IP 250 OP 250 PS 637: Performed by: INTERNAL MEDICINE

## 2022-08-28 PROCEDURE — 250N000013 HC RX MED GY IP 250 OP 250 PS 637: Performed by: PHYSICAL MEDICINE & REHABILITATION

## 2022-08-28 PROCEDURE — 128N000003 HC R&B REHAB

## 2022-08-28 PROCEDURE — 250N000011 HC RX IP 250 OP 636: Performed by: PHYSICAL MEDICINE & REHABILITATION

## 2022-08-28 PROCEDURE — 250N000013 HC RX MED GY IP 250 OP 250 PS 637

## 2022-08-28 PROCEDURE — 36415 COLL VENOUS BLD VENIPUNCTURE: CPT

## 2022-08-28 PROCEDURE — 99232 SBSQ HOSP IP/OBS MODERATE 35: CPT | Performed by: INTERNAL MEDICINE

## 2022-08-28 PROCEDURE — 99233 SBSQ HOSP IP/OBS HIGH 50: CPT | Performed by: PHYSICAL MEDICINE & REHABILITATION

## 2022-08-28 RX ORDER — WARFARIN SODIUM 7.5 MG/1
7.5 TABLET ORAL
Status: COMPLETED | OUTPATIENT
Start: 2022-08-28 | End: 2022-08-28

## 2022-08-28 RX ADMIN — SALINE NASAL SPRAY 2 SPRAY: 1.5 SOLUTION NASAL at 21:12

## 2022-08-28 RX ADMIN — Medication 37.5 MG: at 21:10

## 2022-08-28 RX ADMIN — SERTRALINE HYDROCHLORIDE 100 MG: 100 TABLET ORAL at 09:33

## 2022-08-28 RX ADMIN — ACETAMINOPHEN 975 MG: 325 TABLET, FILM COATED ORAL at 13:12

## 2022-08-28 RX ADMIN — ACETAMINOPHEN 975 MG: 325 TABLET, FILM COATED ORAL at 05:50

## 2022-08-28 RX ADMIN — SALINE NASAL SPRAY 2 SPRAY: 1.5 SOLUTION NASAL at 09:34

## 2022-08-28 RX ADMIN — Medication 10 MG: at 21:11

## 2022-08-28 RX ADMIN — OXYCODONE HYDROCHLORIDE 5 MG: 5 TABLET ORAL at 21:12

## 2022-08-28 RX ADMIN — ONDANSETRON 4 MG: 4 TABLET, ORALLY DISINTEGRATING ORAL at 09:35

## 2022-08-28 RX ADMIN — DIGOXIN 125 MCG: 125 TABLET ORAL at 09:32

## 2022-08-28 RX ADMIN — QUETIAPINE FUMARATE 25 MG: 25 TABLET ORAL at 16:27

## 2022-08-28 RX ADMIN — ASPIRIN 81 MG: 81 TABLET, CHEWABLE ORAL at 09:33

## 2022-08-28 RX ADMIN — ATORVASTATIN CALCIUM 40 MG: 40 TABLET, FILM COATED ORAL at 21:11

## 2022-08-28 RX ADMIN — QUETIAPINE FUMARATE 150 MG: 50 TABLET ORAL at 21:11

## 2022-08-28 RX ADMIN — HYDROXYCHLOROQUINE SULFATE 200 MG: 200 TABLET, FILM COATED ORAL at 21:11

## 2022-08-28 RX ADMIN — QUETIAPINE FUMARATE 25 MG: 25 TABLET ORAL at 09:33

## 2022-08-28 RX ADMIN — ACETAMINOPHEN 975 MG: 325 TABLET, FILM COATED ORAL at 21:10

## 2022-08-28 RX ADMIN — MULTIPLE VITAMINS W/ MINERALS TAB 1 TABLET: TAB at 09:33

## 2022-08-28 RX ADMIN — MYCOPHENOLATE MOFETIL 1500 MG: 500 TABLET, FILM COATED ORAL at 09:33

## 2022-08-28 RX ADMIN — LISINOPRIL 2.5 MG: 2.5 TABLET ORAL at 09:32

## 2022-08-28 RX ADMIN — Medication 37.5 MG: at 13:12

## 2022-08-28 RX ADMIN — HYDROXYCHLOROQUINE SULFATE 200 MG: 200 TABLET, FILM COATED ORAL at 09:34

## 2022-08-28 RX ADMIN — PANTOPRAZOLE SODIUM 40 MG: 40 TABLET, DELAYED RELEASE ORAL at 09:33

## 2022-08-28 RX ADMIN — FLUTICASONE FUROATE AND VILANTEROL TRIFENATATE 1 PUFF: 100; 25 POWDER RESPIRATORY (INHALATION) at 09:34

## 2022-08-28 RX ADMIN — Medication 37.5 MG: at 06:06

## 2022-08-28 RX ADMIN — MYCOPHENOLATE MOFETIL 1500 MG: 500 TABLET, FILM COATED ORAL at 21:10

## 2022-08-28 RX ADMIN — SALINE NASAL SPRAY 2 SPRAY: 1.5 SOLUTION NASAL at 18:12

## 2022-08-28 RX ADMIN — WARFARIN SODIUM 7.5 MG: 7.5 TABLET ORAL at 18:12

## 2022-08-28 ASSESSMENT — ACTIVITIES OF DAILY LIVING (ADL)
ADLS_ACUITY_SCORE: 26
ADLS_ACUITY_SCORE: 28
ADLS_ACUITY_SCORE: 26

## 2022-08-28 NOTE — PROGRESS NOTES
"   08/28/22 0834   Appointment Canceled   Appointment Canceled Patient declined   Cancel Comments Pt states, \"I had a bad night last night, I'm going to have to pass.\"  Th asked if pt needed anything and he stated, \"I just need to try and sleep.\"  He okayed physical therapy checking on him at his schedule appt 9:15.  Handoff given to nsg and PT.   Signing Clinician's Name / Credentials   Signing clinician's name / credentials ESTEFANIA Clement Adds   Rehab Discipline OT     "

## 2022-08-28 NOTE — PROGRESS NOTES
"  [facility]   Acute Rehabilitation Unit  Daily progress note    INTERVAL HISTORY  Dandy Sands was seen and examined at bedside.  Appreciate follow up by Dr. Gallardo. Encouraged to drink liquids, use binder and stockings.     States bowel movements are daily and without  constipation.  Has occasional dizziness when he wakes up, or sits up in bed.  Has ongoing training on LVAD.  He denies dizziness currently, fever, chills, CP, SOB, abdominal discomfort, or new W/N/T.     10 point ROS is negative except what is in HPI.    Functionally   OT  ADLs:  Pt states, \"I had a bad night last night, I'm going to have to pass.\"  Th asked if pt needed anything and he stated, \"I just need to try and sleep.\"  He okayed physical therapy checking on him at his schedule appt 9:15.  Handoff given to nsg and PT.     MEDICATIONS    - Medication Assessment Program - Rehab Services   Does not apply See Admin Instructions     acetaminophen  975 mg Oral Q8H     aspirin  81 mg Oral Daily     atorvastatin  40 mg Oral QPM     digoxin  125 mcg Oral Daily     fluticasone-vilanterol  1 puff Inhalation Daily     hydrALAZINE  37.5 mg Oral TID     hydroxychloroquine  200 mg Oral BID     lisinopril  2.5 mg Oral Daily     melatonin  10 mg Oral At Bedtime     multivitamin w/minerals  1 tablet Oral Daily     mycophenolate  1,500 mg Oral BID     pantoprazole  40 mg Oral QAM AC     QUEtiapine  150 mg Oral or Feeding Tube At Bedtime     QUEtiapine  25 mg Oral BID     sertraline  100 mg Oral Daily     sodium chloride  2 spray Both Nostrils Q4H While awake     warfarin ANTICOAGULANT  7.5 mg Oral ONCE at 18:00     Warfarin Therapy Reminder  1 each Oral See Admin Instructions        LORazepam, naloxone **OR** naloxone **OR** naloxone **OR** naloxone, ondansetron, oxyCODONE, polyethylene glycol, QUEtiapine, senna-docusate     PHYSICAL EXAM  BP 97/78 (BP Location: Left arm, Patient Position: Semi-Alvarez's, Cuff Size: Adult " "Regular)   Pulse 56   Temp (!) 96  F (35.6  C) (Oral)   Resp 18   Ht 1.702 m (5' 7\")   Wt 58.7 kg (129 lb 6.4 oz)   SpO2 98%   BMI 20.27 kg/m    General: Laying in bed, not in acute distress  HEENT: NC/NT, EOMI, moist mucous membranes  Pulmonary: Clear bilateral airways on auscultation.  Cardiovascular: Notable LVAD running and  humming.  Abdominal: Non-tender, non-distended, bowel sounds present.  Extremities: warm, well perfused, no edema in bilateral lower extremities, no tenderness in calves, unchanged power 5/5 bilateral upper and lower extremities.  Neuro/MSK: Alert and oriented, face symmetric. Responds well during team rounds discussion.    LABS  Recent Results (from the past 24 hour(s))   INR    Collection Time: 08/28/22  7:47 AM   Result Value Ref Range    INR 2.28 (H) 0.85 - 1.15       Rehabilitation - continue comprehensive acute inpatient rehabilitation program with multidisciplinary approach including therapies, rehab nursing, and physiatry following. See interval history for updates.      ASSESSMENT AND PLAN  Dandy Sands is a 61 year old right hand dominant male with past medical history of SLE, antiphospholipid syndrome, history pulmonary embolism (on anticoagulation with warfarin), HFrEF due to IMC, HDL. He initially presented to [facility] on 6/13 due to nausea, vomiting, abdominal bloating and transferred to [facility] for admission on 6/17/2022 for decompensated heart failure 2/2 cardiogenic shock c/b multiorgan failure requiring IABP, is s/p HM 3 LVAD on 7/8/2022. Stay was also c/b RV, had 29F Protek duo via RIJ, RVAD removed 7/21, hemopericardium requiring repeat washout, chest was closed 7/13. Other complication epistaxis on 8/6 ENT. Patient has h/o nasal perforation that required elective procedure (in [place]) which was postponed due to LVAD insertion.  He required intubation 8/7 for airway protection, was extubated 8/8.  Nasal pack removed 8/9.     Admission to acute " inpatient rehab: Cardiac; s/p HM3 LVAD.    Impairment group code: 09        1. PT, OT 90 minutes of each on a daily basis, in addition to rehab nursing and close management of physiatrist.       2. Impairment of ADL's: Impairment in strength, endurance, and ROM resulting in functional limitations in patient's ability to perform ADLs.     3. Impairment of mobility:  Impairment in strength, endurance, and ROM resulting in functional limitations in patient's ability to perform functional mobility.     4. Impairment of cognition/language/swallow:  N/A     5. Medical Conditions     #HFrEF 2/2 ICM s/p HM3 LVAD in setting of cardiogenic shock (SCAI D)  #RV failure s/p Protek duo temporary RVAD s/p decannulation 7/21  #RV thrombus  #Post chest closure hemopericardium s/p repeat washout (7/12)  #PMH CAD s/p PCI to LAD (2005)  #S/p CRT-D (2006)  Patient with ICM s/p HM3 LVAD 7/8/22 2/2 cardiogenic shock requiring MCS with IABP as prior to HM (initially evaluated for heart transplant, however noted to have substantially elevated DSAs during course of care, so decision made to proceed with LVAD given patient was already on temporary MCS). Intraoperative course during LVAD placement was c/b RVF resulting in cardiogenic shock requiring high dose pressors necessitating temporary RVAD support. Initial post-op course c/b hemopericardium requiring repeat washout with chest left open, with good recovery extubation and decannulation on 7/21. Patient tolerating hemodynamics and end organ standpoint well. He has had intermittent low flow alarms with high PI, no power spike, and a closed aortic valve on TTE. TTE also showed underfilling LV which could explain his low flow alarms d/t suckdown event so his LVAD speed was reduced to 5200. Patient did have a low flow alarm on 8/20 @ 1145 (PI 8-9 and not hypotensive); CTA chest done which showed that the LVAD outflow tract is widely patent. Patient is euvolemic on exam, but at times has had  orthostasis symptoms; patient encouraged to stay hydrated within his fluid/diet restriction guidelines (no more than 2L/day of fluid intake and no more than 3g of Na per day in dietary intake).  P:  LVAD:  - interrogation showed 1 low flow alarm in last 24 hours as described above  - Is euvolemic 8/21  - Diuretics: Not needed at this time; monitor volume status and weight and consider starting low dose Lasix if he gains weight or starts showing signs of hypervolemia  - Afterload reduction: MAP goal 65-80; Hydralazine 37.5 TID + Lisinopril 2.5 qday  - Statin: Atorvastatin 40mg  - BB: Holding in setting of recent cardiogenic shock and orthostatic symptoms, consider restarting outpatient  - AA: Holding in setting of recent cardiogenic shock and orthostatic symptoms, consider restarting outpatient  - SGLTi: As outpatient, has been held due to immunosuppression he is on for his SLE.  - Warfarin for INR goal 2-3  - ASA 81 mg qday     #Epistaxis, resolved  #Intubated due to Concern for airway protection (8/7)-Extubated (8/8)  #Mild-moderate emphysema  #History of COVID-19  #Iatrogenic R apical PTX  H/o nasal perforation  For elective procedure (in [place]), postponed 2/2 LVAD insertion. Epistaxis not fixed by packing overnight on 8/6 and pt continued to bleed. Intubated for airway protection. Controlled at bedside by ENT s/p cautery and packing. Extubated 8/8. Had Cefepime/Vanc empirically for broad coverage for PNA (8/7-8/12). Patient removed his own nasal packing overnight on 8/9. ENT no longer following.    - C/t Ayr nasal saline gel at bedtime + nasal saline q4H while awake 8/24  - Continue as needed inhalers, PFT as able  --- Breo Ellipta discontinued 8/25.  Patient not using.  -  followup with his ENT as OP (see below)     #Insomnia/Delirium, resolved  Some concern that he had a fall overnight on 8/6. Discussed with nursing staff and he did NOT have a fall, although he did try to get out of bed. CT head was  obtained (8/7) and it was unremarkable. Patient had some issues with delirium and insomnia during hospitalization and psychiatry was consulted who recommended  - Sertraline and Quetiapine per psych recs     # ? Orthostatic  Noted to be dizzy with therapy.  Has occasional hypotension with SBP high 80s.  May benefit from compressions.  -Compression socks and, abdominal binder 8/22    #GERD  #Congestive hepatopathy in the setting of cardiac insuffiency - Resolved  #Hematemesis / oral mucosal bleeding -resolved    #Dysphagia  GI consulted 7/31 for black tarry stools and a possible GI bleed, however it is more likely swallowed blood from epistaxis that patient previously had. GI did not recommend an upper endoscopy. Recurrence of bloody stools likely due to epistaxis which have resolved. Hemoglobin was monitored and continued to be stable throughout the rest of the hospitalization. Cleared for regular diet by SLP.     #Anemia due to blood loss from Epistaxis-Resolved  #History of DVT and PE   #Antiphospholipid antibody syndrome  #History of iron deficiency anemia  As noted above, likely related to epistaxis. After above interventions were complete and management was initiated per ENT recs, hemoglobin remained stable and no further evidence of bleeding.  - Saline (OCEAN) nasal sprays Q4H when awake.     #SLE  - PTA meds: hydroxychloroquine 200mg, resumed 7/16  - Restart PTA Cellcept at discharge     #Oral thrush, resolved  Completed 14 days of nystatin        6. Adjustment to disability:  Clinical psychology to eval and treat as needed  7. FEN:  Regular with thins  8. Bowel: As needed MiraLAX and Senokot  9. Bladder: Pending review  10. DVT Prophylaxis: Warfarin goal 2-3 for LVAD, daily INR, and ASA 81  11. GI Prophylaxis: Pantoprazole 40 mg daily  12. Code: Full  13. Disposition: Home with homecare and Home with outpatient therapy  14. ELOS: 12 to 14 days.  15. Rehab prognosis: Fair   16. Follow up Appointments on  Discharge:   -PCP follow-up 2 to 3 weeks : Requires CBC BMP  -Follow-up with [facility] at next available appointment and follow-up with your cardiologist in the next few months at next available appointment   - Follow-up with psychiatry post discharge  - Followup with his ENT in [place] to rediscuss possible procedure for his nasal perforation vs. conservative management     Appreciate IM follow ups and assists. Anxious about discharge date  Reassured that he will need to have his family here before he is discharged.    Doing well. Continue cares and plans outlined.    Abner Mathew MD

## 2022-08-28 NOTE — PROGRESS NOTES
"   08/28/22 1400   Appointment Canceled   Appointment Canceled Patient declined;Nausea/Vomiting   Cancel Comments PT: Pt states \"I'm not doing anything today.\" Cites illness.   Signing Clinician's Name / Credentials   Signing clinician's name / credentials DAYANA Garcia Adds   Rehab Discipline PT     "

## 2022-08-28 NOTE — PROGRESS NOTES
[facility]    Medicine Progress Note - Hospitalist Service    Date of Admission:  8/21/2022    Assessment & Plan        Dandy Sands is a 60 yo male with medical history of SLE, antiphospholipid antibody syndrome, PE on anticoagulation with warfarin, HFrEF due to ICM and HDL.  He initially presented to [facility] in [place] on 6/13/22 with nausea, vomiting and abd bloating.  Transferred to [facility] on 6/17/22 w/ decompensated heart failure, cardiogenic shock and multiorgan failure.  He  required IABP and then underwent HM3 LVAD placement on 7/8/22 by Dr Zamora.  Surgery was complicated with RV failure requiring 29F Protek duo via RIJ RVAD placement ( removed 7/21/22), hemopericardium requiring repeat washout, chest was closed 7/13/22.  Other post LVAD placement complication included epistaxis requiring intubation 8/7/22 for airway protection.  Pt removed his own nasal packing.  He was eventually extubated.  He was transferred to acute inpatient rehab 8/21/2022 for further rehab and cares      HFrEF 2/2 ICM s/p HM3 LVAD in setting of cardiogenic shock   RV failure s/p Protek duo temporary RVAD, s/p decannulation 7/21  RV thrombus  Post chest closure hemopericardium, s/p repeat washout (7/12)  H/o CAD s/p PCI to LAD (2005), S/p CRT-D (2006), NTEMI 2007  ---   Coronary angio 5/2022 showed severe ostial LAD disease with in-stent restenosis, mid LAD dz involved diagonal bifurcation, mild LCx dz and severe prox RCA disease   ---   Evaluated for heart transplant but due to elevated DSAs, decision was made to proceed with LVAD likely as destination therapy  ---   CABG was considered but decision was made for medical therapy in light of his low EF and cardiac MRI viability study showed nonviable myocardium in LAD territory    ---   TAVARES 8/20 reveled EF 10-15%  ---   Continue to have intermittent low flow alarm but previous w/u were negative   ---    "Encouraged pt to increase his oral intake  ---   Treated w/  ml over 4 hrs on 8/27/22  ---   Continue hydralazine 37.5 tid and lisinopril 2.5 mg daily for afterload reduction  ---   Continue ASA, digoxin and atorvastatin  ---   Pt to wear abd binder and compression stocking  ---   Goal MAP to be below 80   ---   On coumadin for RV thrombus w/ INR goal 2-3.  INR is 2.28 today   ---   Needs EP evaluation for increasing capture threshold of RV lead   ---   LVAD coordinator # [phone] or [phone]    Acute hypoxic respiratory failure   ---   Intubated 8/7 for airway protection due to severe epistaxis  ---   Extubated 8/8 w/o any difficulties  ---   Has underlying mild-moderate Emphysema  ---   Seen by pulmonary consult service at the [place]  ---   Continue Breao Ellipta, prn inhalers, PFT as able     ---   Iatrogenic apical PTX, resolved    ---   Respiratory failure resolved    PE  H/o antiphospholipid antibody syndrome  ---   Continue Coumadin, pharmacy is dosing  ---   INR is 2.28 today     Epistaxis  ---   Due to Coumadin + ASA  ---   Has a history of nasal perforation and underwent elective procedure in [place]  ---   He required intubation for airway protection   ---   Seen by ENT consult service and underwent catheterization   ---   Patient removed his own nasal packing  ---   Extubated  ---   Epistaxis resolved  ---   Per ENT \" If bleeding recurs, spray Afrin (3 sprays) and hold firm digital pressure over the soft part of the nose for 20 minutes continuously.  Nasal clips are ineffective and should not be used in place of digital pressure.  If bleeding continues despite these measures, repeat. If bleeding continues or is severe please contact ENT\"     E.faecalis bacteremia  ---   Blood culture from PICC line (8/2/22), 1 out 2 bottles grew staph epi (probably contaminant) and the other bottle grew E. Faecalis.    ---   NGTD from subsequent blood cultures   ---   He received Cefepime and Vancomycin, " 8/7 - 8/12/22  ---   Treated for oral thrush for 14 days   ---   Has a h/o COVID-19 infection 1/2022 ( needed intubation )        Hematemesis  Black tarry stool   ---   He was seen by GI and was felt was due to epistaxis and not GI bleed   ---   on PPI   ---   Congestive hepatopathy, shocked liver on presentation, now LFT back to normal    ---   Dysphasia post intubation, resolved and now on regular diet     Extremity and facial numbness 8/22/22  ---   Had no focal neurologic deficit  ---   CT head without contrast was negative for acute intracranial pathology  ---   Resolved    Delirium   Depression  Insomnia  ---   CT head negative for acute intracranial pathology 8/7/22   ---   MS has been clear past couple days  ---   Continue sertraline   ---   Continue quetiapine 150 mg at night and 25 mg bid     ---   Continue melatonin       Acute blood loss anemia  superimposed on BRENDA  ---   Hgb stable at 10 g  ---   Not on Fe supplement     SLE  ---   Continue PTA hydroxychloroquine and cellcept 1500 mg po bid     Severe Malnutrition  ---   Based on nutrition assessment   ---   Nutritional supplement being provided            Diet: Room Service  Combination Diet Regular Diet; Thin Liquids (level 0)  Snacks/Supplements Adult: Ensure Enlive; With Meals    DVT Prophylaxis: warfarin   Fitzgerald Catheter: Not present  Central Lines: None    Cardiac Monitoring: None  Code Status: Full Code      Disposition Plan   TBD            Dave Gallardo MD  Hospitalist Service  [facility]  Securely message with the Vocera Web Console (learn more here)  Text page via [employer] Paging/Directory           ____________________________________________________________________    Interval History    LVAD flow 3.9 this am   Denied lightheadedness and dizziness  C/o nausea    Data reviewed today: I reviewed all medications, new labs and imaging results over the last 24 hours.  Physical Exam   Vital Signs: Temp:  (!) 96  F (35.6  C) Temp src: Oral BP: 97/78 (Map # 87) Pulse: 56   Resp: 18 SpO2: 98 % O2 Device: None (Room air)    Weight: 129 lbs 6.4 oz  General: aao x 3, NAD.  HEENT:  NC/AT, neck supple  CVS:  NL s 1 and s2, no m/r/g. LVAD sound audible  Lungs:  Bibasilar rhonchi, no wheezing   Abd:  Soft, + bs, NT, no rebound or gaurding, no fluid shift.  Ext:  No c/c.  Lymph:  No edema.  Neuro:  Nonfocal.  Musculoskeletal: No calf tenderness to palpation.    Skin:  No rash.  Psychiatry:  Mood and affect appropriate.      Data   Recent Labs   Lab 08/28/22  0747 08/27/22  0816 08/26/22  0642 08/23/22  0635 08/22/22  0653   WBC  --  5.6  --   --  6.9   HGB  --  10.5*  --   --  10.1*   MCV  --  92  --   --  94   PLT  --  289  --   --  272   INR 2.28* 2.19* 2.26*   < > 2.47*   NA  --  139  --   --  137   POTASSIUM  --  3.8  --   --  4.1   CHLORIDE  --  111*  --   --  108   CO2  --  23  --   --  26   BUN  --  11  --   --  10   CR  --  0.59*  --   --  0.58*   ANIONGAP  --  5  --   --  3   ELDA  --  9.2  --   --  9.5   GLC  --  101*  --   --  97    < > = values in this interval not displayed.     No results found for this or any previous visit (from the past 24 hour(s)).

## 2022-08-28 NOTE — PLAN OF CARE
FOCUS/GOAL  Medical management and Safety management    ASSESSMENT, INTERVENTIONS AND CONTINUING PLAN FOR GOAL:    Patient is alert and oriented. Does not want to be disturbed at night. Denied sob, dizziness, headache or any new weakness. Mild back pain, took scheduled tylenol. Continent of bladder using the urinal with staff to empty. Did not get up in bed. MAP this morning via doppler was 90, recheck is 80. Given Morning dose of hydralazine. MAP at 82 upon recheck. Remains asymptomatic.     Goal Outcome Evaluation:

## 2022-08-28 NOTE — PLAN OF CARE
Goal Outcome Evaluation:    Overall Patient Progress: no change    Outcome Evaluation: Pt denies pain at start of shift then with lower back pain at 2030H, scheduled Tylenol and PRN oxycodone given which were effective. Complained of numbness on bilateral side of face and arms once (at 2030H) scheduled meds given and monitored.LVAD parameters met, MAP=70. Symptoms completely resolved after an hour. Dr on-call (Dr. Bernstein) updated and with phone order to monitor pt. Pt comfortable in bed, still awake at 2230H. Pt calls for his meds but not consistent, able to pick out meds correctly.

## 2022-08-29 ENCOUNTER — CARE COORDINATION (OUTPATIENT)
Dept: TRANSPLANT | Facility: CLINIC | Age: 62
End: 2022-08-29

## 2022-08-29 ENCOUNTER — APPOINTMENT (OUTPATIENT)
Dept: PHYSICAL THERAPY | Facility: CLINIC | Age: 62
DRG: 949 | End: 2022-08-29
Attending: PHYSICAL MEDICINE & REHABILITATION
Payer: COMMERCIAL

## 2022-08-29 ENCOUNTER — APPOINTMENT (OUTPATIENT)
Dept: OCCUPATIONAL THERAPY | Facility: CLINIC | Age: 62
DRG: 949 | End: 2022-08-29
Attending: PHYSICAL MEDICINE & REHABILITATION
Payer: COMMERCIAL

## 2022-08-29 ENCOUNTER — CARE COORDINATION (OUTPATIENT)
Dept: CARDIOLOGY | Facility: CLINIC | Age: 62
End: 2022-08-29

## 2022-08-29 DIAGNOSIS — M27.63 FRACTURE OF DENTAL IMPLANT: Primary | ICD-10-CM

## 2022-08-29 LAB
ALBUMIN SERPL-MCNC: 3.2 G/DL (ref 3.4–5)
ALP SERPL-CCNC: 156 U/L (ref 40–150)
ALT SERPL W P-5'-P-CCNC: 23 U/L (ref 0–70)
ANION GAP SERPL CALCULATED.3IONS-SCNC: 8 MMOL/L (ref 3–14)
AST SERPL W P-5'-P-CCNC: 24 U/L (ref 0–45)
BASOPHILS # BLD AUTO: 0 10E3/UL (ref 0–0.2)
BASOPHILS NFR BLD AUTO: 0 %
BILIRUB SERPL-MCNC: 0.5 MG/DL (ref 0.2–1.3)
BUN SERPL-MCNC: 14 MG/DL (ref 7–30)
CALCIUM SERPL-MCNC: 9.6 MG/DL (ref 8.5–10.1)
CHLORIDE BLD-SCNC: 108 MMOL/L (ref 94–109)
CO2 SERPL-SCNC: 24 MMOL/L (ref 20–32)
CREAT SERPL-MCNC: 0.69 MG/DL (ref 0.66–1.25)
EOSINOPHIL # BLD AUTO: 0.1 10E3/UL (ref 0–0.7)
EOSINOPHIL NFR BLD AUTO: 2 %
ERYTHROCYTE [DISTWIDTH] IN BLOOD BY AUTOMATED COUNT: 17 % (ref 10–15)
GFR SERPL CREATININE-BSD FRML MDRD: >90 ML/MIN/1.73M2
GLUCOSE BLD-MCNC: 104 MG/DL (ref 70–99)
HCT VFR BLD AUTO: 34.8 % (ref 40–53)
HGB BLD-MCNC: 10.9 G/DL (ref 13.3–17.7)
IMM GRANULOCYTES # BLD: 0 10E3/UL
IMM GRANULOCYTES NFR BLD: 0 %
INR PPP: 2.32 (ref 0.85–1.15)
LYMPHOCYTES # BLD AUTO: 1.1 10E3/UL (ref 0.8–5.3)
LYMPHOCYTES NFR BLD AUTO: 17 %
MCH RBC QN AUTO: 29.2 PG (ref 26.5–33)
MCHC RBC AUTO-ENTMCNC: 31.3 G/DL (ref 31.5–36.5)
MCV RBC AUTO: 93 FL (ref 78–100)
MONOCYTES # BLD AUTO: 0.6 10E3/UL (ref 0–1.3)
MONOCYTES NFR BLD AUTO: 9 %
NEUTROPHILS # BLD AUTO: 4.9 10E3/UL (ref 1.6–8.3)
NEUTROPHILS NFR BLD AUTO: 72 %
NRBC # BLD AUTO: 0 10E3/UL
NRBC BLD AUTO-RTO: 0 /100
PLATELET # BLD AUTO: 304 10E3/UL (ref 150–450)
POTASSIUM BLD-SCNC: 3.9 MMOL/L (ref 3.4–5.3)
PROT SERPL-MCNC: 8.1 G/DL (ref 6.8–8.8)
RBC # BLD AUTO: 3.73 10E6/UL (ref 4.4–5.9)
SODIUM SERPL-SCNC: 140 MMOL/L (ref 133–144)
WBC # BLD AUTO: 6.8 10E3/UL (ref 4–11)

## 2022-08-29 PROCEDURE — 97535 SELF CARE MNGMENT TRAINING: CPT | Mod: GO | Performed by: STUDENT IN AN ORGANIZED HEALTH CARE EDUCATION/TRAINING PROGRAM

## 2022-08-29 PROCEDURE — 128N000003 HC R&B REHAB

## 2022-08-29 PROCEDURE — 97110 THERAPEUTIC EXERCISES: CPT | Mod: GP | Performed by: STUDENT IN AN ORGANIZED HEALTH CARE EDUCATION/TRAINING PROGRAM

## 2022-08-29 PROCEDURE — 85025 COMPLETE CBC W/AUTO DIFF WBC: CPT

## 2022-08-29 PROCEDURE — 80053 COMPREHEN METABOLIC PANEL: CPT

## 2022-08-29 PROCEDURE — 97530 THERAPEUTIC ACTIVITIES: CPT | Mod: GP | Performed by: STUDENT IN AN ORGANIZED HEALTH CARE EDUCATION/TRAINING PROGRAM

## 2022-08-29 PROCEDURE — 250N000013 HC RX MED GY IP 250 OP 250 PS 637

## 2022-08-29 PROCEDURE — 250N000012 HC RX MED GY IP 250 OP 636 PS 637

## 2022-08-29 PROCEDURE — 97110 THERAPEUTIC EXERCISES: CPT | Mod: GO | Performed by: STUDENT IN AN ORGANIZED HEALTH CARE EDUCATION/TRAINING PROGRAM

## 2022-08-29 PROCEDURE — 85610 PROTHROMBIN TIME: CPT

## 2022-08-29 PROCEDURE — 250N000013 HC RX MED GY IP 250 OP 250 PS 637: Performed by: INTERNAL MEDICINE

## 2022-08-29 PROCEDURE — 99233 SBSQ HOSP IP/OBS HIGH 50: CPT | Mod: GC | Performed by: PHYSICAL MEDICINE & REHABILITATION

## 2022-08-29 PROCEDURE — 99232 SBSQ HOSP IP/OBS MODERATE 35: CPT | Performed by: INTERNAL MEDICINE

## 2022-08-29 PROCEDURE — 36415 COLL VENOUS BLD VENIPUNCTURE: CPT

## 2022-08-29 RX ORDER — WARFARIN SODIUM 3 MG/1
6 TABLET ORAL
Status: COMPLETED | OUTPATIENT
Start: 2022-08-29 | End: 2022-08-29

## 2022-08-29 RX ORDER — ALBUTEROL SULFATE 90 UG/1
2 AEROSOL, METERED RESPIRATORY (INHALATION) EVERY 6 HOURS PRN
Status: DISCONTINUED | OUTPATIENT
Start: 2022-08-29 | End: 2022-09-04 | Stop reason: HOSPADM

## 2022-08-29 RX ADMIN — ASPIRIN 81 MG: 81 TABLET, CHEWABLE ORAL at 09:28

## 2022-08-29 RX ADMIN — QUETIAPINE FUMARATE 25 MG: 25 TABLET ORAL at 15:56

## 2022-08-29 RX ADMIN — SALINE NASAL SPRAY 2 SPRAY: 1.5 SOLUTION NASAL at 09:34

## 2022-08-29 RX ADMIN — MYCOPHENOLATE MOFETIL 1500 MG: 500 TABLET, FILM COATED ORAL at 09:28

## 2022-08-29 RX ADMIN — SERTRALINE HYDROCHLORIDE 100 MG: 100 TABLET ORAL at 09:26

## 2022-08-29 RX ADMIN — ACETAMINOPHEN 975 MG: 325 TABLET, FILM COATED ORAL at 06:57

## 2022-08-29 RX ADMIN — HYDROXYCHLOROQUINE SULFATE 200 MG: 200 TABLET, FILM COATED ORAL at 09:26

## 2022-08-29 RX ADMIN — Medication 37.5 MG: at 19:01

## 2022-08-29 RX ADMIN — OXYCODONE HYDROCHLORIDE 5 MG: 5 TABLET ORAL at 20:50

## 2022-08-29 RX ADMIN — QUETIAPINE FUMARATE 150 MG: 50 TABLET ORAL at 20:43

## 2022-08-29 RX ADMIN — PANTOPRAZOLE SODIUM 40 MG: 40 TABLET, DELAYED RELEASE ORAL at 09:26

## 2022-08-29 RX ADMIN — ATORVASTATIN CALCIUM 40 MG: 40 TABLET, FILM COATED ORAL at 20:46

## 2022-08-29 RX ADMIN — MULTIPLE VITAMINS W/ MINERALS TAB 1 TABLET: TAB at 09:28

## 2022-08-29 RX ADMIN — MYCOPHENOLATE MOFETIL 1500 MG: 500 TABLET, FILM COATED ORAL at 20:41

## 2022-08-29 RX ADMIN — ACETAMINOPHEN 975 MG: 325 TABLET, FILM COATED ORAL at 20:45

## 2022-08-29 RX ADMIN — Medication 37.5 MG: at 14:33

## 2022-08-29 RX ADMIN — Medication 10 MG: at 20:46

## 2022-08-29 RX ADMIN — SALINE NASAL SPRAY 2 SPRAY: 1.5 SOLUTION NASAL at 14:33

## 2022-08-29 RX ADMIN — LISINOPRIL 2.5 MG: 2.5 TABLET ORAL at 09:32

## 2022-08-29 RX ADMIN — DIGOXIN 125 MCG: 125 TABLET ORAL at 09:26

## 2022-08-29 RX ADMIN — QUETIAPINE FUMARATE 25 MG: 25 TABLET ORAL at 09:26

## 2022-08-29 RX ADMIN — WARFARIN SODIUM 6 MG: 3 TABLET ORAL at 18:50

## 2022-08-29 RX ADMIN — HYDROXYCHLOROQUINE SULFATE 200 MG: 200 TABLET, FILM COATED ORAL at 20:44

## 2022-08-29 RX ADMIN — ACETAMINOPHEN 975 MG: 325 TABLET, FILM COATED ORAL at 14:33

## 2022-08-29 RX ADMIN — SALINE NASAL SPRAY 2 SPRAY: 1.5 SOLUTION NASAL at 20:44

## 2022-08-29 RX ADMIN — Medication 37.5 MG: at 09:28

## 2022-08-29 ASSESSMENT — ACTIVITIES OF DAILY LIVING (ADL)
ADLS_ACUITY_SCORE: 28

## 2022-08-29 NOTE — PROGRESS NOTES
Waitlist Removal-Transplant    Dandy Sands was removed from the [facility] waitlist as of 08/29/22 for the following reason:  Elevated PRAs    Primary cardiologist aware of the removal.  Per protocol, patient letter will be sent.

## 2022-08-29 NOTE — PROGRESS NOTES
[facility]    Medicine Progress Note - Hospitalist Service    Date of Admission:  8/21/2022    Assessment & Plan        Dandy Sands is a 62 yo male with medical history of SLE, antiphospholipid antibody syndrome, PE on anticoagulation with warfarin, HFrEF due to ICM and HDL.  He initially presented to [facility] in [place] on 6/13/22 with nausea, vomiting and abd bloating.  Transferred to [facility] on 6/17/22 w/ decompensated heart failure, cardiogenic shock and multiorgan failure.  He required IABP and then underwent HM3 LVAD placement on 7/8/22 by Dr. Zamora.  Surgery was complicated with RV failure requiring 29F Protek duo via RIJ RVAD placement ( removed 7/21/22), hemopericardium requiring repeat washout, chest was closed 7/13/22.  Other post LVAD placement complication included epistaxis requiring intubation 8/7/22 for airway protection.  Pt removed his own nasal packing.  He was eventually extubated.  He was transferred to acute inpatient rehab 8/21/2022 for further rehab and cares      HFrEF 2/2 ICM s/p HM3 LVAD in setting of cardiogenic shock   RV failure s/p Protek duo temporary RVAD, s/p decannulation 7/21  RV thrombus  Post chest closure hemopericardium, s/p repeat washout (7/12)  H/o CAD s/p PCI to LAD (2005), S/p CRT-D (2006), NTEMI 2007  ---   Coronary angio 5/2022 showed severe ostial LAD disease with in-stent restenosis, mid LAD dz involved diagonal bifurcation, mild LCx dz and severe prox RCA disease   ---   Evaluated for heart transplant but due to elevated DSAs, decision was made to proceed with LVAD likely as destination therapy  ---   CABG was considered but decision was made for medical therapy in light of his low EF and cardiac MRI viability study showed nonviable myocardium in LAD territory    ---   TAVARES 8/20 reveled EF 10-15%  ---   Continued to have intermittent low flow alarm but previous w/u were negative   ---    "Encouraged pt to increase his oral intake  ---   Continue hydralazine 37.5 tid and lisinopril 2.5 mg daily for afterload reduction  ---   Continue ASA, digoxin and atorvastatin  ---   Pt to wear abd binder and compression stocking  ---   Goal MAP to be below 80   ---   On coumadin for RV thrombus w/ INR goal 2-3.  INR is 2.32 today   ---   Needs EP evaluation for increasing capture threshold of RV lead   ---   LVAD coordinator # [phone] or [phone]    Acute hypoxic respiratory failure   ---   Intubated 8/7 for airway protection due to severe epistaxis  ---   Extubated 8/8 w/o any difficulties  ---   Has underlying mild-moderate Emphysema  ---   Seen by pulm consult service at the [facility]  ---   Continue Breao Ellipta, albuterol inhaler as needed, PFT as able     ---   Iatrogenic apical PTX, resolved    ---   Respiratory failure resolved    PE  H/o antiphospholipid antibody syndrome  ---   Continue Coumadin, pharmacy is dosing  ---   INR is 2.32 today     Epistaxis  ---   Due to Coumadin + ASA  ---   Has a history of nasal perforation and underwent elective procedure in [place]  ---   He required intubation for airway protection   ---   Seen by ENT consult service and underwent catheterization   ---   Patient removed his own nasal packing  ---   Extubated w/o any difficulties  ---   Epistaxis resolved  ---   Per ENT \" If bleeding recurs, spray Afrin (3 sprays) and hold firm digital pressure over the soft part of the nose for 20 minutes continuously.  Nasal clips are ineffective and should not be used in place of digital pressure.  If bleeding continues despite these measures, repeat. If bleeding continues or is severe please contact ENT\"     E.faecalis bacteremia  ---   Blood culture from PICC line (8/2/22), 1 out 2 bottles grew staph epi (probably contaminant) and the other bottle grew E. Faecalis.    ---   NGTD from subsequent blood cultures   ---   He received Cefepime and Vancomycin, 8/7 - 8/12/22  ---   " Treated for oral thrush for 14 days   ---   Has a h/o COVID-19 infection 1/2022 ( needed intubation )        Hematemesis  Black tarry stool   ---   He was seen by GI, felt melena was due to epistaxis and not GI bleed   ---   Congestive hepatopathy, shocked liver on presentation, now LFT back to normal    ---   Dysphasia post intubation, resolved   ---   On regular diet   ---   Continue PPI     Extremity and facial numbness 8/22/22  ---   Had no focal neurologic deficit  ---   CT head without contrast was negative for acute intracranial pathology  ---   Resolved    Delirium   Depression  Insomnia  ---   CT head negative for acute intracranial pathology 8/7/22   ---   MS has been clear   ---   Continue sertraline   ---   Continue quetiapine 150 mg at night and 25 mg bid     ---   Continue melatonin       Acute blood loss anemia  superimposed on BRENDA  ---   Hgb stable at 10 g  ---   Not on Fe supplement     SLE  ---   Continue PTA hydroxychloroquine and cellcept 1500 mg po bid     Severe Malnutrition  ---   Based on nutrition assessment   ---   Nutritional supplement being provided            Diet: Room Service  Combination Diet Regular Diet; Thin Liquids (level 0)  Snacks/Supplements Adult: Ensure Enlive; With Meals    DVT Prophylaxis: warfarin   Fitzgerald Catheter: Not present  Central Lines: None    Cardiac Monitoring: None  Code Status: Full Code      Disposition Plan   TBD            Dave Gallardo MD  Hospitalist Service  [facility]  Securely message with the Vocera Web Console (learn more here)  Text page via [employer] Paging/Directory           ____________________________________________________________________    Interval History    Denied lightheadedness and dizziness  Denied nausea or vomiting  Uneventful night  No complaints.    Data reviewed today: I reviewed all medications, new labs and imaging results over the last 24 hours.  Physical Exam   Vital Signs: Temp: (!)  95.5  F (35.3  C) Temp src: Oral BP: (!) 79/65 Pulse: 53   Resp: 17 SpO2: 97 % O2 Device: None (Room air)    Weight: 129 lbs 6.4 oz  General: aao x 3, NAD.  HEENT:  NC/AT, neck supple  CVS:  NL s 1 and s2, no m/r/g. LVAD sound audible  Lungs:  Faint bibasilar rhonchi, no wheezing   Abd:  Soft, + bs, NT, no rebound or gaurding, no fluid shift.  Ext:  No c/c.  Lymph:  No edema.  Neuro:  Nonfocal.  Musculoskeletal: No calf tenderness to palpation.    Skin:  No rash.  Psychiatry:  Mood and affect appropriate.      Data   Recent Labs   Lab 08/29/22  0804 08/28/22  0747 08/27/22  0816   WBC 6.8  --  5.6   HGB 10.9*  --  10.5*   MCV 93  --  92     --  289   INR 2.32* 2.28* 2.19*     --  139   POTASSIUM 3.9  --  3.8   CHLORIDE 108  --  111*   CO2  --   --  23   BUN  --   --  11   CR  --   --  0.59*   ANIONGAP  --   --  5   ELDA  --   --  9.2   GLC  --   --  101*     No results found for this or any previous visit (from the past 24 hour(s)).

## 2022-08-29 NOTE — PROGRESS NOTES
[facility]   Acute Rehabilitation Unit  Daily progress note    INTERVAL HISTORY  Dandy Sands was seen and examined at bedside.  No acute events reported overnight.  He says he feels good this morning. He only endorses some mild L. thoracic pain with movement and when he sleeps on the side. Discussed with him to try and avoid laying at 90 degrees on the L. Side. Denies dizziness, headache, fever/chills, or abdominal discomfort.  LBM 8/28    Functionally,   OT:  ADLs:   For Mobility and Grooming: SBA, walks with FWW for short distance, Dressing: setup for UB and LB dressing, Bathing: no showers, UB sponge bath set up assist and close SBA standing, pt declined LB washing,Toileting: SBA for transfer and toileting  IADLs: son can assist  Vision/Cognition: wears glasses, cognition to be assessed as needed    Today's Updates:  - None    MEDICATIONS    - Medication Assessment Program - Rehab Services   Does not apply See Admin Instructions     acetaminophen  975 mg Oral Q8H     aspirin  81 mg Oral Daily     atorvastatin  40 mg Oral QPM     digoxin  125 mcg Oral Daily     fluticasone-vilanterol  1 puff Inhalation Daily     hydrALAZINE  37.5 mg Oral TID     hydroxychloroquine  200 mg Oral BID     lisinopril  2.5 mg Oral Daily     melatonin  10 mg Oral At Bedtime     multivitamin w/minerals  1 tablet Oral Daily     mycophenolate  1,500 mg Oral BID     pantoprazole  40 mg Oral QAM AC     QUEtiapine  150 mg Oral or Feeding Tube At Bedtime     QUEtiapine  25 mg Oral BID     sertraline  100 mg Oral Daily     sodium chloride  2 spray Both Nostrils Q4H While awake     warfarin ANTICOAGULANT  6 mg Oral ONCE at 18:00     Warfarin Therapy Reminder  1 each Oral See Admin Instructions        albuterol, LORazepam, naloxone **OR** naloxone **OR** naloxone **OR** naloxone, ondansetron, oxyCODONE, polyethylene glycol, QUEtiapine, senna-docusate     PHYSICAL EXAM  BP (!) 79/65 (BP Location: Left arm)   " Pulse 60   Temp (!) 95.5  F (35.3  C) (Oral)   Resp 17   Ht 1.702 m (5' 7\")   Wt 58.7 kg (129 lb 6.4 oz)   SpO2 97%   BMI 20.27 kg/m    General: Laying in bed. No acute distress  HEENT: EOMI, NC/NT, Moist mucous membranes  Pulmonary: Breathing comfortably on room air, clear to auscultation bilaterally  Cardiovascular: LVAD humming well. Regular rate and rhythm  Abdominal: Non-tender, non-distended, bowel sounds present in all four quadrants   Extremities: warm, well perfused, no edema in bilateral lower extremities, no tenderness in calves power 5/5 in BUE and BLE.  Neuro/MSK: Alert and oriented, face symmetric.     LABS  Recent Results (from the past 24 hour(s))   Asymptomatic COVID-19 Virus (Coronavirus) by PCR Nose    Collection Time: 08/28/22  9:48 PM    Specimen: Nose; Swab   Result Value Ref Range    SARS CoV2 PCR Negative Negative   INR    Collection Time: 08/29/22  8:04 AM   Result Value Ref Range    INR 2.32 (H) 0.85 - 1.15   Comprehensive metabolic panel    Collection Time: 08/29/22  8:04 AM   Result Value Ref Range    Sodium 140 133 - 144 mmol/L    Potassium 3.9 3.4 - 5.3 mmol/L    Chloride 108 94 - 109 mmol/L    Carbon Dioxide (CO2) 24 20 - 32 mmol/L    Anion Gap 8 3 - 14 mmol/L    Urea Nitrogen 14 7 - 30 mg/dL    Creatinine 0.69 0.66 - 1.25 mg/dL    Calcium 9.6 8.5 - 10.1 mg/dL    Glucose 104 (H) 70 - 99 mg/dL    Alkaline Phosphatase 156 (H) 40 - 150 U/L    AST 24 0 - 45 U/L    ALT 23 0 - 70 U/L    Protein Total 8.1 6.8 - 8.8 g/dL    Albumin 3.2 (L) 3.4 - 5.0 g/dL    Bilirubin Total 0.5 0.2 - 1.3 mg/dL    GFR Estimate >90 >60 mL/min/1.73m2   CBC with platelets and differential    Collection Time: 08/29/22  8:04 AM   Result Value Ref Range    WBC Count 6.8 4.0 - 11.0 10e3/uL    RBC Count 3.73 (L) 4.40 - 5.90 10e6/uL    Hemoglobin 10.9 (L) 13.3 - 17.7 g/dL    Hematocrit 34.8 (L) 40.0 - 53.0 %    MCV 93 78 - 100 fL    MCH 29.2 26.5 - 33.0 pg    MCHC 31.3 (L) 31.5 - 36.5 g/dL    RDW 17.0 (H) 10.0 - " 15.0 %    Platelet Count 304 150 - 450 10e3/uL    % Neutrophils 72 %    % Lymphocytes 17 %    % Monocytes 9 %    % Eosinophils 2 %    % Basophils 0 %    % Immature Granulocytes 0 %    NRBCs per 100 WBC 0 <1 /100    Absolute Neutrophils 4.9 1.6 - 8.3 10e3/uL    Absolute Lymphocytes 1.1 0.8 - 5.3 10e3/uL    Absolute Monocytes 0.6 0.0 - 1.3 10e3/uL    Absolute Eosinophils 0.1 0.0 - 0.7 10e3/uL    Absolute Basophils 0.0 0.0 - 0.2 10e3/uL    Absolute Immature Granulocytes 0.0 <=0.4 10e3/uL    Absolute NRBCs 0.0 10e3/uL       Rehabilitation - continue comprehensive acute inpatient rehabilitation program with multidisciplinary approach including therapies, rehab nursing, and physiatry following. See interval history for updates.      ASSESSMENT AND PLAN  Dandy Sands is a 61 year old right hand dominant male with past medical history of SLE, antiphospholipid syndrome, history pulmonary embolism (on anticoagulation with warfarin), HFrEF due to IMC, HDL. He initially presented to [facility] on 6/13 due to nausea, vomiting, abdominal bloating and transferred to [facility] for admission on 6/17/2022 for decompensated heart failure 2/2 cardiogenic shock c/b multiorgan failure requiring IABP, is s/p HM 3 LVAD on 7/8/2022. Stay was also c/b RV, had 29F Protek duo via RIJ, RVAD removed 7/21, hemopericardium requiring repeat washout, chest was closed 7/13. Other complication epistaxis on 8/6 ENT. Patient has h/o nasal perforation that required elective procedure (in [place]) which was postponed due to LVAD insertion.  He required intubation 8/7 for airway protection, was extubated 8/8.  Nasal pack removed 8/9.     Admission to acute inpatient rehab: Cardiac; s/p HM3 LVAD.    Impairment group code: 09        1. PT, OT 90 minutes of each on a daily basis, in addition to rehab nursing and close management of physiatrist.       2. Impairment of ADL's: Impairment in strength, endurance, and ROM resulting in functional  limitations in patient's ability to perform ADLs.     3. Impairment of mobility:  Impairment in strength, endurance, and ROM resulting in functional limitations in patient's ability to perform functional mobility.     4. Impairment of cognition/language/swallow:  N/A     5. Medical Conditions     #HFrEF 2/2 ICM s/p HM3 LVAD in setting of cardiogenic shock (SCAI D)  #RV failure s/p Protek duo temporary RVAD s/p decannulation 7/21  #RV thrombus  #Post chest closure hemopericardium s/p repeat washout (7/12)  #PMH CAD s/p PCI to LAD (2005)  #S/p CRT-D (2006)  Patient with ICM s/p HM3 LVAD 7/8/22 2/2 cardiogenic shock requiring MCS with IABP as prior to HM (initially evaluated for heart transplant, however noted to have substantially elevated DSAs during course of care, so decision made to proceed with LVAD given patient was already on temporary MCS). Intraoperative course during LVAD placement was c/b RVF resulting in cardiogenic shock requiring high dose pressors necessitating temporary RVAD support. Initial post-op course c/b hemopericardium requiring repeat washout with chest left open, with good recovery extubation and decannulation on 7/21. Patient tolerating hemodynamics and end organ standpoint well. He has had intermittent low flow alarms with high PI, no power spike, and a closed aortic valve on TTE. TTE also showed underfilling LV which could explain his low flow alarms d/t suckdown event so his LVAD speed was reduced to 5200. Patient did have a low flow alarm on 8/20 @ 1145 (PI 8-9 and not hypotensive); CTA chest done which showed that the LVAD outflow tract is widely patent. Patient is euvolemic on exam, but at times has had orthostasis symptoms; patient encouraged to stay hydrated within his fluid/diet restriction guidelines (no more than 2L/day of fluid intake and no more than 3g of Na per day in dietary intake).  P:  LVAD: Appreciate   - interrogation showed 1 low flow alarm in last 24 hours as described  above  - Is euvolemic since 8/21  - Diuretics: Not needed at this time; monitor volume status and weight and consider starting low dose Lasix if he gains weight or starts showing signs of hypervolemia  - Afterload reduction: MAP goal 65-80; Hydralazine 37.5 TID + Lisinopril 2.5 qday  - Statin: Atorvastatin 40mg  - BB: Holding in setting of recent cardiogenic shock and orthostatic symptoms, consider restarting outpatient   - AA: Holding in setting of recent cardiogenic shock and orthostatic symptoms, consider restarting outpatient  - SGLTi: As outpatient, has been held due to immunosuppression he is on for his SLE.  - Warfarin for INR goal 2-3. INR 2.32 today  - ASA 81 mg qday, and Digoxin     #Epistaxis, resolved  #Intubated due to Concern for airway protection (8/7)-Extubated (8/8)  #Mild-moderate emphysema  #History of COVID-19  #Iatrogenic R apical PTX  H/o nasal perforation  For elective procedure (in [place]), postponed 2/2 LVAD insertion. Epistaxis not fixed by packing overnight on 8/6 and pt continued to bleed. Intubated for airway protection. Controlled at bedside by ENT s/p cautery and packing. Extubated 8/8. Had Cefepime/Vanc empirically for broad coverage for PNA (8/7-8/12). Patient removed his own nasal packing overnight on 8/9. ENT no longer following.    - C/t Ayr nasal saline gel at bedtime + nasal saline q4H while awake 8/24  - Continue as needed inhalers, PFT as able  --- Breo Ellipta discontinued 8/25.  Patient not using.  -  followup with his ENT as OP (see below)     #Insomnia/Delirium, resolved  Some concern that he had a fall overnight on 8/6. Discussed with nursing staff and he did NOT have a fall, although he did try to get out of bed. CT head was obtained (8/7) and it was unremarkable. Patient had some issues with delirium and insomnia during hospitalization and psychiatry was consulted who recommended  - Sertraline and Quetiapine per psych recs     # ? Orthostatic  Noted to be dizzy  with therapy.  Has occasional hypotension with SBP high 80s.  May benefit from compressions.  -Compression socks and, abdominal binder 8/22     #GERD  #Congestive hepatopathy in the setting of cardiac insuffiency - Resolved  #Hematemesis / oral mucosal bleeding -resolved    #Dysphagia  GI consulted 7/31 for black tarry stools and a possible GI bleed, however it is more likely swallowed blood from epistaxis that patient previously had. GI did not recommend an upper endoscopy. Recurrence of bloody stools likely due to epistaxis which have resolved. Hemoglobin was monitored and continued to be stable throughout the rest of the hospitalization. Cleared for regular diet by SLP.     #Anemia due to blood loss from Epistaxis-Resolved  #History of DVT and PE   #Antiphospholipid antibody syndrome  #History of iron deficiency anemia  As noted above, likely related to epistaxis. After above interventions were complete and management was initiated per ENT recs, hemoglobin remained stable and no further evidence of bleeding.  - Saline (OCEAN) nasal sprays Q4H when awake.     #SLE  - PTA meds: hydroxychloroquine 200mg, resumed 7/16  - Restart PTA Cellcept at discharge     #Oral thrush, resolved  Completed 14 days of nystatin        6. Adjustment to disability:  Clinical psychology to eval and treat as needed  7. FEN:  Regular with thins  8. Bowel: As needed MiraLAX and Senokot  9. Bladder: Pending review  10. DVT Prophylaxis: Warfarin goal 2-3 for LVAD, daily INR, and ASA 81  11. GI Prophylaxis: Pantoprazole 40 mg daily  12. Code: Full  13. Disposition: Home with homecare and Home with outpatient therapy  14. ELOS: 12 to 14 days.  15. Rehab prognosis: Fair   16. Follow up Appointments on Discharge:   -PCP follow-up 2 to 3 weeks : Requires CBC BMP  -Follow-up with [facility] at next available appointment and follow-up with your cardiologist in the next few months at next available appointment   - Follow-up with psychiatry post  discharge  - Followup with his ENT in [place] to rediscuss possible procedure for his nasal perforation vs. conservative management        Patient seen and discussed with Dr. Mathew, PM&R Staff Physician    Padmini Linda MD   Resident Physician, PGY-2   Physical Medicine and Rehabilitation  [facility]

## 2022-08-29 NOTE — PROGRESS NOTES
CLINICAL NUTRITION SERVICES - REASSESSMENT NOTE     Nutrition Prescription    RECOMMENDATIONS FOR MDs/PROVIDERS TO ORDER:  None today     Malnutrition Status:    Severe malnutrition in the context of acute illness or injury     Recommendations already ordered by Registered Dietitian (RD):  None today - continue supplement as ordered     Future/Additional Recommendations:  Continue to monitor meal/supplement intakes, weight and lab trends if pt were to remain admitted to ARU     EVALUATION OF THE PROGRESS TOWARD GOALS   Diet: Regular  Supplement: Chocolate Ensure once daily at breakfast meal (auto send) + PRN  Intake: 25-00% per flow sheets        NEW FINDINGS   MAR reviewed. Labs reviewed. RD visited pt at bedside, pt continues to report stable intakes with family brining in outside food for menu fatigue, pt agreeing to continue supplement as ordered, denied needing additional nutrition interventions at this time, noted plan now for discharge this coming weekend.     08/27/22 1051 58.7 kg (129 lb 6.4 oz) Standing scale   08/26/22 0912 59.4 kg (130 lb 14.4 oz) Standing scale   08/25/22 0608 59.3 kg (130 lb 11.2 oz) Standing scale   08/24/22 1416 62.1 kg (136 lb 12.8 oz) Standing scale   08/22/22 0635 59.7 kg (131 lb 9.6 oz) Standing scale   08/21/22 1153 60.1 kg (132 lb 8 oz) Standing scale     07/08/22 62.6 kg (138 lb)   05/26/22 64.6 kg (142 lb 8 oz)     Weight assessment: Weight on pt's board in room noted at 132 lbs, so appears somewhat stable from admit to ARU, non-significant 4.3% weight loss in 1 month, non-significant 7.0% weight loss in 3 months from board weight.      MALNUTRITION  % Intake: Decreased intake does not meet criteria  % Weight Loss: Weight loss now does not meet criteria  Subcutaneous Fat Loss: Facial region: mild to moderate   Muscle Loss: Temporal, facial & jaw region: mild to moderate   Fluid Accumulation/Edema: None noted  Malnutrition Diagnosis: Severe malnutrition in the context of acute  illness or injury     Previous Goals   Patient to consume % of nutritionally adequate meal trays TID, or the equivalent with supplements/snacks  Evaluation: Not met, but likely overall improving     Previous Nutrition Diagnosis  Increased nutrient needs related to post transplant as evidenced by therapeutic recommendations   Evaluation: No change    CURRENT NUTRITION DIAGNOSIS  Increased nutrient needs related to post transplant as evidenced by therapeutic recommendations    INTERVENTIONS  Implementation  Medical food supplement therapy - continue as ordered     Goals  Patient to consume % of nutritionally adequate meal trays TID, or the equivalent with supplements/snacks.    Monitoring/Evaluation  Progress toward goals will be monitored and evaluated per protocol.    Virginia Schroeder RD, CNSC, LD  ARU RD pager: [phone]

## 2022-08-29 NOTE — PLAN OF CARE
Discharge Planner Post-Acute Rehab PT:      Discharge Plan: [facility] with son. Home care PT, pending insurance.     Precautions: Sternal, falls     Current Status:  Bed Mobility: IND  Transfer: SBA with FWW  Gait: up to 200 ft, SBA with FWW  Stairs: 4 stairs (6''), CGA  Balance: David on 8/26: 37/56  2MWT on 8/26: 127 ft with FWW     Assessment:   Pt now saying he understands need for using FWW for now, he is hoping to not need it by time he returns home in ~one month. Progressed to MOD I with FWW during day while on battery power, will need to demo improved sequencing when moving while on wall power to progress mod I at all times. Fatigue still limiting.      Other Barriers to Discharge (DME, Family Training, etc):   Family training to be scheduled.  Anticipate 4WW to be issued.

## 2022-08-29 NOTE — PLAN OF CARE
Discharge Planner Post-Acute Rehab OT:     Discharge Plan: to [facility] with son     Precautions: fall, strernal precautions, LVAD, lucero hose and abdominal binder to be worn due to low BP    Current Status:  ADLs:    Mobility: SBA with walker for short distance    Grooming: setup, close SBA standing    Dressing: setup for UB dressing, set up for LB dressing    Bathing: no showers, UB sponge bath set up assist and close SBA standing, pt declined LB washing    Toileting: SBA for transfer and toileting  IADLs: son can assist  Vision/Cognition: wears glasses, cognition to be assessed as needed    Assessment: Working on functional mobility with the walker. Pt often states that he won't be using one when he goes home despite education. SBA using the walker with all activity in the room and in the gym. Pt also reports his caregiver is not available until Sat for discharge.     Other Barriers to Discharge (DME, Family Training, etc): dizziness

## 2022-08-29 NOTE — PROGRESS NOTES
Pt had dental team consult while inpt following fracture/dislodgement of dental implants following LVAD surgery on 7/8. Pt is trying to schedule appt at dental clinic, however is told that referral is needed. Order placed.

## 2022-08-29 NOTE — PLAN OF CARE
Goal Outcome Evaluation:    Overall Patient Progress: no change    Outcome Evaluation: Pt still with lower back pain, PRN oxycodone given once, scheduled Tylenol given, self repositioned. Medications plus repositioning/rest effective for pain control. Ate 100% of meal brought in by son per pt report. LVAD parameters met, MAP = 78. Denies numbness and tingling. Medication Administration Program continued and reexplained to pt to call consistently. Pt called appropriately and picked out his medications correctly. PIV on R forearm patent. Driveline site with scant yeloowish drainage, dressing chnaged tonight.

## 2022-08-29 NOTE — PLAN OF CARE
Goal Outcome Evaluation:    Plan of Care Reviewed With: patient     Overall Patient Progress: improving    Outcome Evaluation: RD followed up with pt at bedside, po intakes stable, pt will continue supplement as ordered, see RD progress note for reassessment details.

## 2022-08-29 NOTE — PLAN OF CARE
"Goal Outcome Evaluation:    Plan of Care Reviewed With: patient     Overall Patient Progress: no change    FOCUS/GOAL  Bowel management, Bladder management, Medication management, Pain management, Mobility, Skin integrity, and Safety management    ASSESSMENT, INTERVENTIONS AND CONTINUING PLAN FOR GOAL:    Pt A/O x4. Transfers SBA using gait belt and ww. Continent of bowel and bladder. Denies pain. LVAD WNL, site CDI. MAP 84. Regular diet/thin liquids, takes meds whole, poor appetite continues. MAP program for discharge planning. Calls for meds; but later than due times. Does not use provided medication sheet; will instead just read the bottles for anything that states \"morning.\" Provided education that blood pressure, pulse, MAP should be obtained before taking certain medications, pt states, \"Why would I need to know those things?\" Will continue education on medication management before discharge.         "

## 2022-08-30 ENCOUNTER — CARE COORDINATION (OUTPATIENT)
Dept: TRANSPLANT | Facility: CLINIC | Age: 62
End: 2022-08-30

## 2022-08-30 ENCOUNTER — APPOINTMENT (OUTPATIENT)
Dept: PHYSICAL THERAPY | Facility: CLINIC | Age: 62
DRG: 949 | End: 2022-08-30
Attending: PHYSICAL MEDICINE & REHABILITATION
Payer: COMMERCIAL

## 2022-08-30 ENCOUNTER — APPOINTMENT (OUTPATIENT)
Dept: OCCUPATIONAL THERAPY | Facility: CLINIC | Age: 62
DRG: 949 | End: 2022-08-30
Attending: PHYSICAL MEDICINE & REHABILITATION
Payer: COMMERCIAL

## 2022-08-30 LAB
HOLD SPECIMEN: NORMAL
HOLD SPECIMEN: NORMAL
INR PPP: 2.72 (ref 0.85–1.15)

## 2022-08-30 PROCEDURE — 97530 THERAPEUTIC ACTIVITIES: CPT | Mod: GP

## 2022-08-30 PROCEDURE — 250N000013 HC RX MED GY IP 250 OP 250 PS 637

## 2022-08-30 PROCEDURE — 250N000013 HC RX MED GY IP 250 OP 250 PS 637: Performed by: INTERNAL MEDICINE

## 2022-08-30 PROCEDURE — 97530 THERAPEUTIC ACTIVITIES: CPT | Mod: GP | Performed by: PHYSICAL THERAPIST

## 2022-08-30 PROCEDURE — 97129 THER IVNTJ 1ST 15 MIN: CPT | Mod: GO

## 2022-08-30 PROCEDURE — 128N000003 HC R&B REHAB

## 2022-08-30 PROCEDURE — 250N000012 HC RX MED GY IP 250 OP 636 PS 637

## 2022-08-30 PROCEDURE — 97110 THERAPEUTIC EXERCISES: CPT | Mod: GP

## 2022-08-30 PROCEDURE — 99233 SBSQ HOSP IP/OBS HIGH 50: CPT | Mod: GC | Performed by: PHYSICAL MEDICINE & REHABILITATION

## 2022-08-30 PROCEDURE — 36415 COLL VENOUS BLD VENIPUNCTURE: CPT

## 2022-08-30 PROCEDURE — 97130 THER IVNTJ EA ADDL 15 MIN: CPT | Mod: GO

## 2022-08-30 PROCEDURE — 85610 PROTHROMBIN TIME: CPT

## 2022-08-30 RX ORDER — WARFARIN SODIUM 5 MG/1
5 TABLET ORAL
Status: COMPLETED | OUTPATIENT
Start: 2022-08-30 | End: 2022-08-30

## 2022-08-30 RX ADMIN — SALINE NASAL SPRAY 2 SPRAY: 1.5 SOLUTION NASAL at 09:58

## 2022-08-30 RX ADMIN — DIGOXIN 125 MCG: 125 TABLET ORAL at 08:34

## 2022-08-30 RX ADMIN — ACETAMINOPHEN 975 MG: 325 TABLET, FILM COATED ORAL at 06:45

## 2022-08-30 RX ADMIN — Medication 10 MG: at 20:32

## 2022-08-30 RX ADMIN — WARFARIN SODIUM 5 MG: 5 TABLET ORAL at 17:04

## 2022-08-30 RX ADMIN — Medication 37.5 MG: at 08:34

## 2022-08-30 RX ADMIN — MYCOPHENOLATE MOFETIL 1500 MG: 500 TABLET, FILM COATED ORAL at 20:31

## 2022-08-30 RX ADMIN — HYDROXYCHLOROQUINE SULFATE 200 MG: 200 TABLET, FILM COATED ORAL at 08:34

## 2022-08-30 RX ADMIN — QUETIAPINE FUMARATE 25 MG: 25 TABLET ORAL at 08:34

## 2022-08-30 RX ADMIN — OXYCODONE HYDROCHLORIDE 5 MG: 5 TABLET ORAL at 20:32

## 2022-08-30 RX ADMIN — LISINOPRIL 2.5 MG: 2.5 TABLET ORAL at 08:33

## 2022-08-30 RX ADMIN — Medication 37.5 MG: at 20:31

## 2022-08-30 RX ADMIN — QUETIAPINE FUMARATE 150 MG: 50 TABLET ORAL at 20:31

## 2022-08-30 RX ADMIN — SALINE NASAL SPRAY 2 SPRAY: 1.5 SOLUTION NASAL at 20:35

## 2022-08-30 RX ADMIN — MYCOPHENOLATE MOFETIL 1500 MG: 500 TABLET, FILM COATED ORAL at 08:34

## 2022-08-30 RX ADMIN — Medication 37.5 MG: at 14:18

## 2022-08-30 RX ADMIN — SALINE NASAL SPRAY 2 SPRAY: 1.5 SOLUTION NASAL at 14:19

## 2022-08-30 RX ADMIN — QUETIAPINE FUMARATE 25 MG: 25 TABLET ORAL at 17:04

## 2022-08-30 RX ADMIN — HYDROXYCHLOROQUINE SULFATE 200 MG: 200 TABLET, FILM COATED ORAL at 20:32

## 2022-08-30 RX ADMIN — SERTRALINE HYDROCHLORIDE 100 MG: 100 TABLET ORAL at 08:34

## 2022-08-30 RX ADMIN — ASPIRIN 81 MG: 81 TABLET, CHEWABLE ORAL at 08:34

## 2022-08-30 RX ADMIN — PANTOPRAZOLE SODIUM 40 MG: 40 TABLET, DELAYED RELEASE ORAL at 08:34

## 2022-08-30 RX ADMIN — ATORVASTATIN CALCIUM 40 MG: 40 TABLET, FILM COATED ORAL at 20:32

## 2022-08-30 RX ADMIN — MULTIPLE VITAMINS W/ MINERALS TAB 1 TABLET: TAB at 08:34

## 2022-08-30 RX ADMIN — SALINE NASAL SPRAY 2 SPRAY: 1.5 SOLUTION NASAL at 17:11

## 2022-08-30 RX ADMIN — ACETAMINOPHEN 975 MG: 325 TABLET, FILM COATED ORAL at 14:19

## 2022-08-30 ASSESSMENT — ACTIVITIES OF DAILY LIVING (ADL)
ADLS_ACUITY_SCORE: 28
ADLS_ACUITY_SCORE: 30
ADLS_ACUITY_SCORE: 28
ADLS_ACUITY_SCORE: 30
ADLS_ACUITY_SCORE: 28
ADLS_ACUITY_SCORE: 28

## 2022-08-30 NOTE — PLAN OF CARE
FOCUS/GOAL  Medical management    ASSESSMENT, INTERVENTIONS AND CONTINUING PLAN FOR GOAL:  Pt is seen  sleeping during safety round checks. Use urinal at night time. Independent w/ bed mobility. Alert and oriented when he woke up this morning, complain of being sore, scheduled tylenol given. MAP was 80, other LVAD parameters are w/in normal exempt for the PI, it flactuates. No complain of chest pain or dizziness. Encouraged pt to drink fluids. Cont to monitor.  Goal Outcome Evaluation:    Plan of Care Reviewed With: patient     Overall Patient Progress: no change

## 2022-08-30 NOTE — PROGRESS NOTES
"  [facility]   Acute Rehabilitation Unit  Daily progress note    INTERVAL HISTORY  Dandy Sands was seen and examined at bedside.  He continues to have occasion high PI 8.4 and MAP. Discussed with patient will follow up with hospitalist. Although asymptomatic during the episodes. He was also noted to refuse therapy this morning as he had poor sleep overnight. He is otherwise looking forward to the rest of his later therapy sessions. Denies CP, SOB, Abdominal pain or LUTS.  LBM 8/28    Functionally,   PT 8/30  Bed Mobility: IND  Transfer: SBA with FWW  Gait: up to 200 ft, SBA with FWW  Stairs: 4 stairs (6''), CGA  Balance: David on 8/26: 37/56  2MWT on 8/26: 127 ft with FWW    Today's Updates:  - None    MEDICATIONS    acetaminophen  975 mg Oral Q8H     aspirin  81 mg Oral Daily     atorvastatin  40 mg Oral QPM     digoxin  125 mcg Oral Daily     fluticasone-vilanterol  1 puff Inhalation Daily     hydrALAZINE  37.5 mg Oral TID     hydroxychloroquine  200 mg Oral BID     lisinopril  2.5 mg Oral Daily     melatonin  10 mg Oral At Bedtime     multivitamin w/minerals  1 tablet Oral Daily     mycophenolate  1,500 mg Oral BID     pantoprazole  40 mg Oral QAM AC     QUEtiapine  150 mg Oral or Feeding Tube At Bedtime     QUEtiapine  25 mg Oral BID     sertraline  100 mg Oral Daily     sodium chloride  2 spray Both Nostrils Q4H While awake     sodium chloride (PF)  5 mL Intracatheter Q8H     warfarin ANTICOAGULANT  5 mg Oral ONCE at 18:00     Warfarin Therapy Reminder  1 each Oral See Admin Instructions        albuterol, LORazepam, naloxone **OR** naloxone **OR** naloxone **OR** naloxone, ondansetron, oxyCODONE, polyethylene glycol, QUEtiapine, senna-docusate     PHYSICAL EXAM  BP (!) 79/65 (BP Location: Left arm)   Pulse 64   Temp (!) 95.6  F (35.3  C) (Oral)   Resp 16   Ht 1.702 m (5' 7\")   Wt 60.1 kg (132 lb 9.6 oz)   SpO2 100%   BMI 20.77 kg/m    General: Laying in bed, " comfortable, No acute distress  HEENT: EOMI, NC/NT, Moist mucous membranes  Pulmonary: Breathing comfortably on room air, clear to auscultation bilaterally  Cardiovascular: LVAD humming well.   Abdominal: Non-tender, non-distended, bowel sounds present in all four quadrants, abdominal bandages dry and in place. Non tender surrounding tissue.   Extremities: warm, well perfused, no edema in bilateral lower extremities, no tenderness in calves power 5/5 in BUE and BLE.  Neuro/MSK: Alert and oriented, face symmetric.     LABS  Recent Results (from the past 24 hour(s))   INR    Collection Time: 08/30/22  5:54 AM   Result Value Ref Range    INR 2.72 (H) 0.85 - 1.15   Extra Green Top (Lithium Heparin) Tube    Collection Time: 08/30/22  5:54 AM   Result Value Ref Range    Hold Specimen JIC    Extra Purple Top Tube    Collection Time: 08/30/22  5:54 AM   Result Value Ref Range    Hold Specimen JIC        Rehabilitation - continue comprehensive acute inpatient rehabilitation program with multidisciplinary approach including therapies, rehab nursing, and physiatry following. See interval history for updates.      ASSESSMENT AND PLAN  Dandy Sands is a 61 year old right hand dominant male with past medical history of SLE, antiphospholipid syndrome, history pulmonary embolism (on anticoagulation with warfarin), HFrEF due to IMC, HDL. He initially presented to [facility] on 6/13 due to nausea, vomiting, abdominal bloating and transferred to [facility] for admission on 6/17/2022 for decompensated heart failure 2/2 cardiogenic shock c/b multiorgan failure requiring IABP, is s/p HM 3 LVAD on 7/8/2022. Stay was also c/b RV, had 29F Protek duo via RIJ, RVAD removed 7/21, hemopericardium requiring repeat washout, chest was closed 7/13. Other complication epistaxis on 8/6 ENT. Patient has h/o nasal perforation that required elective procedure (in [place]) which was postponed due to LVAD insertion.  He required intubation 8/7 for  airway protection, was extubated 8/8.  Nasal pack removed 8/9.     Admission to acute inpatient rehab: Cardiac; s/p HM3 LVAD.    Impairment group code: 09        1. PT, OT 90 minutes of each on a daily basis, in addition to rehab nursing and close management of physiatrist.       2. Impairment of ADL's: Impairment in strength, endurance, and ROM resulting in functional limitations in patient's ability to perform ADLs.     3. Impairment of mobility:  Impairment in strength, endurance, and ROM resulting in functional limitations in patient's ability to perform functional mobility.     4. Impairment of cognition/language/swallow:  N/A     5. Medical Conditions     #HFrEF 2/2 ICM s/p HM3 LVAD in setting of cardiogenic shock (SCAI D)  #RV failure s/p Protek duo temporary RVAD s/p decannulation 7/21  #RV thrombus  #Post chest closure hemopericardium s/p repeat washout (7/12)  #PMH CAD s/p PCI to LAD (2005)  #S/p CRT-D (2006)  Patient with ICM s/p HM3 LVAD 7/8/22 2/2 cardiogenic shock requiring MCS with IABP as prior to HM (initially evaluated for heart transplant, however noted to have substantially elevated DSAs during course of care, so decision made to proceed with LVAD given patient was already on temporary MCS). Intraoperative course during LVAD placement was c/b RVF resulting in cardiogenic shock requiring high dose pressors necessitating temporary RVAD support. Initial post-op course c/b hemopericardium requiring repeat washout with chest left open, with good recovery extubation and decannulation on 7/21. Patient tolerating hemodynamics and end organ standpoint well. He has had intermittent low flow alarms with high PI, no power spike, and a closed aortic valve on TTE. TTE also showed underfilling LV which could explain his low flow alarms d/t suckdown event so his LVAD speed was reduced to 5200. Patient did have a low flow alarm on 8/20 @ 1145 (PI 8-9 and not hypotensive); CTA chest done which showed that the LVAD  outflow tract is widely patent. Patient is euvolemic on exam, but at times has had orthostasis symptoms; patient encouraged to stay hydrated within his fluid/diet restriction guidelines (no more than 2L/day of fluid intake and no more than 3g of Na per day in dietary intake).  P:  LVAD: Appreciate   - interrogation showed 1 low flow alarm in last 24 hours as described above  - Is euvolemic since 8/21  - Diuretics: Not needed at this time; monitor volume status and weight and consider starting low dose Lasix if he gains weight or starts showing signs of hypervolemia  - Afterload reduction: MAP goal 65-80; Hydralazine 37.5 TID + Lisinopril 2.5 qday  - Statin: Atorvastatin 40mg  - BB: Holding in setting of recent cardiogenic shock and orthostatic symptoms, consider restarting outpatient   - AA: Holding in setting of recent cardiogenic shock and orthostatic symptoms, consider restarting outpatient  - SGLTi: As outpatient, has been held due to immunosuppression he is on for his SLE.  - Warfarin for INR goal 2-3. INR 2.72 today  - ASA 81 mg qday, and Digoxin     #Epistaxis, resolved  #Intubated due to Concern for airway protection (8/7)-Extubated (8/8)  #Mild-moderate emphysema  #History of COVID-19  #Iatrogenic R apical PTX  H/o nasal perforation  For elective procedure (in [place]), postponed 2/2 LVAD insertion. Epistaxis not fixed by packing overnight on 8/6 and pt continued to bleed. Intubated for airway protection. Controlled at bedside by ENT s/p cautery and packing. Extubated 8/8. Had Cefepime/Vanc empirically for broad coverage for PNA (8/7-8/12). Patient removed his own nasal packing overnight on 8/9. ENT no longer following.    - C/t Ayr nasal saline gel at bedtime + nasal saline q4H while awake 8/24  - Continue as needed inhalers, PFT as able  --- Breo Ellipta discontinued 8/25.  Patient not using.  -  followup with his ENT as OP (see below)     #Insomnia/Delirium, resolved  Some concern that he had a fall  overnight on 8/6. Discussed with nursing staff and he did NOT have a fall, although he did try to get out of bed. CT head was obtained (8/7) and it was unremarkable. Patient had some issues with delirium and insomnia during hospitalization and psychiatry was consulted who recommended  - Sertraline and Quetiapine per psych recs     # ? Orthostatic  Noted to be dizzy with therapy.  Has occasional hypotension with SBP high 80s.  May benefit from compressions.  -Compression socks and, abdominal binder 8/22     #GERD  #Congestive hepatopathy in the setting of cardiac insuffiency - Resolved  #Hematemesis / oral mucosal bleeding -resolved    #Dysphagia  GI consulted 7/31 for black tarry stools and a possible GI bleed, however it is more likely swallowed blood from epistaxis that patient previously had. GI did not recommend an upper endoscopy. Recurrence of bloody stools likely due to epistaxis which have resolved. Hemoglobin was monitored and continued to be stable throughout the rest of the hospitalization. Cleared for regular diet by SLP.     #Anemia due to blood loss from Epistaxis-Resolved  #History of DVT and PE   #Antiphospholipid antibody syndrome  #History of iron deficiency anemia  As noted above, likely related to epistaxis. After above interventions were complete and management was initiated per ENT recs, hemoglobin remained stable and no further evidence of bleeding.  - Saline (OCEAN) nasal sprays Q4H when awake.     #SLE  - PTA meds: hydroxychloroquine 200mg, resumed 7/16  - Restart PTA Cellcept at discharge     #Oral thrush, resolved  Completed 14 days of nystatin        6. Adjustment to disability:  Clinical psychology to eval and treat as needed  7. FEN:  Regular with thins  8. Bowel: As needed MiraLAX and Senokot  9. Bladder: Pending review  10. DVT Prophylaxis: Warfarin goal 2-3 for LVAD, daily INR, and ASA 81  11. GI Prophylaxis: Pantoprazole 40 mg daily  12. Code: Full  13. Disposition: Home with  homecare and Home with outpatient therapy  14. ELOS: 12 to 14 days.  15. Rehab prognosis: Fair   16. Follow up Appointments on Discharge:   - PCP follow-up 2 to 3 weeks : Requires CBC BMP  - Follow-up with [facility] at next available appointment and follow-up with your cardiologist in the next few months at next available appointment   - Follow-up with psychiatry post discharge  - Followup with his ENT in [place] to rediscuss possible procedure for his nasal perforation vs. conservative management        Patient seen and discussed with Dr. Mathew, PM&R Staff Physician    Padmini Linda MD   Resident Physician, PGY-2   Physical Medicine and Rehabilitation  [facility]

## 2022-08-30 NOTE — PLAN OF CARE
Goal Outcome Evaluation:    Plan of Care Reviewed With: patient     Overall Patient Progress: improving     Pt is not able to pass MAP and will need help with medications after discharge, Pt continues to advocate for pain management, pt is knowledgable of LVAD dressing change.    FOCUS/GOAL  Medication management, Pain management, and Wound care management    ASSESSMENT, INTERVENTIONS AND CONTINUING PLAN FOR GOAL:  Pt Aox4, using call light to make needs known.  Pt is REJI durring day if hooked to batteries, pt is compliant with calling when not on batteries.  Denies pain at beginning of shift, requested PRN oxy at HS for back pain which was effective.  LVAD parameters met for everything except MAP which has been elevated all shift, updated hospitalist who asked to monitor at first, updated again at 2010 after MAP was still elevated, reported to writer that hospitalist would reach out to LVAD coordinator and cardiology to see if they wanted to do anything extra, Writer has not heard back from hospitalist by end of shift, pt has been asymptomatic of elevated MAP's.  LVAD dressing change done on writers shift. Cont of B&B, LBM 8/29. Reg/thin, taking pills whole with water.  Nursing will continue with POC.      Symptoms started 2 days ago.  Cough has been going on for a few days.   Mom was diagnosed with influenza A.   Fever? Not sure. States that there thermometers are not working. Last temperature checked was 98.9   Pt does currently have body aches, headache and cough.    Influenza-Like Illness (EDISON) Protocol    Mendy J Skjervold David      Age: 23 year old     YOB: 1997    Please select the type of triage protocol you used for this patient? Epic [facility] or another form of electronic triage protocol was used.     Influenza-Like Illness (EDISON) Standing Order    Has the patient been seen by a primary care provider at [facility] or [facility] within the past two years? Yes     Do any of the following exclusions apply to the patient?  Does the patient have a history of CrCl less than or equal to 60 ml/min No   Is the patient taking Probenecid No   Was an Intranasal live attenuated influenza vaccine (LAIV) received by the patient 2 weeks before antiviral medication No   Was an LAIV received 48 hours after administration of influenza antiviral drugs, if planning on obtaining? No     Does this patient have ANY of the above exclusions answered Yes?  No.      Is this patient currently showing symptoms of Influenza like Illness (EDISON) after close proximity to someone with a verified diagnosis of Influenza, or is this patient not sick but has had known exposure within less than or equal to 48 hours through close proximity with someone that has a verified diagnosis of Influenza?   This patient is currently sick with EDISON type symptoms     Does this patient have ANY of the following specialty conditions?  Chemotherapy or radiation within the last 3 months No   Organ or bone marrow transplant No   Metabolic disorder No   HIV patient with CD4 count <200 No     Does this patient have ANY of the above specialty conditions? No     Have the symptoms of EDISON  been present for less than or equal to 48 hours? Yes     Adult Evaluation for Possible Influenza Treatment due to EDISON Symptoms      Below are conditions which place adults at increased risk for the more severe complications of influenza.    Does this patient have ANY of the following conditions?  Is 65 years of age or older No   Chronic pulmonary disease such as asthma or COPD No   Heart disease (CHF, CAD, anticoagulation d/t arrhytmia, congenital heart anomaly) *HTN alone is excluded No   Kidney disease (renal failure, insufficiency or dialysis) No   Hepatic or Hematologic disorder (e.g. chronic liver disease patient, sickle cell disease) No   Diabetes (Type 1 or Type 2) No   Neurologic and Neurodevelopment Conditions (including disorders of the brain, spinal cord, peripheral nerve, and muscle, such as cerebral palsy, epilepsy (seizure disorders), stroke, intellectual disability, moderate to severe developmental delay, muscular dystrophy, or spinal cord injury) No   Obese with BMI >40 No   Immunosuppression caused by medications such as those taking prednisone in excess of 20mg daily, or caused by HIV/AIDS with CD4 count more than 200 No   Is pregnant, may be pregnant, or is within two weeks after delivery No   Is a resident of a chronic care facility No   Is patient  or [place] native No   Is <19 years old and is receiving long term aspirin- or salicylate-containing medications No     Does this patient have ANY of the above conditions?  No. Recommend a virtual visit such as an [facility] appointment age 1 to 65, or a telephone visit with the provider (LIP).  If virtual visit is not available, consider an in-office visit with provider based on same or next day access. If virtual visit not available, consider in-office visit with provider based on same day or next day access.    Recommended home care treatment for symptoms:  -fluids, rest, tylenol for fever, and ibuprofen for muscle aches.  If symptoms  worsening or fever spikes, schedule appt with provider in clinic. Patient verbalized understanding.    Rosanne Araiza RN      Additional Information    Negative: Probable influenza (fever) with no complications and not HIGH RISK    Negative: Influenza vaccine, questions about    Negative: Severe difficulty breathing (e.g., struggling for each breath, speaks in single words)    Negative: Bluish (or gray) lips or face    Negative: Shock suspected (e.g., cold/pale/clammy skin, too weak to stand, low BP, rapid pulse)    Negative: Sounds like a life-threatening emergency to the triager    Negative: Sounds like a cold and there is no fever    Negative: Cough and there is no fever    Negative: Severe cough    Negative: Throat pain and there is no fever    Negative: Severe sore throat    Negative: Influenza vaccine reaction is suspected    Negative: Headache and stiff neck (can't touch chin to chest)    Negative: Chest pain (EXCEPTION: MILD central chest pain, present only when coughing)    Negative: Difficulty breathing that is not severe and not relieved by cleaning out the nose    Negative: Patient sounds very sick or weak to the triager    Patient requests antiviral medicine for influenza and flu symptoms present < 48 hours    Negative: Patient wants to be seen    Negative: Using nasal washes and pain medicine > 24 hours and sinus pain (lower forehead, cheekbone, or eye) persists    Negative: Fever present > 3 days (72 hours)    Negative: Fever returns after gone for over 24 hours and symptoms worse (or not improved)    Negative: Earache    Negative: Fever > 104 F (40 C)    Negative: Fever > 101 F (38.3 C) and over 60 years of age    Negative: Fever > 100.0 F (37.8 C) and diabetes mellitus or weak immune system (e.g., HIV positive, cancer chemo, splenectomy, organ transplant, chronic steroids)    Negative: Fever > 100.0 F (37.8 C) and bedridden (e.g., nursing home patient, stroke, chronic illness, recovering from  surgery)    Negative: Nasal discharge present > 10 days    Negative: Cough present > 3 weeks    Probable mild influenza (no fever) or a common cold with no complications and not HIGH RISK    Protocols used: INFLUENZA - SEASONAL-A-OH

## 2022-08-30 NOTE — PROGRESS NOTES
Called patient to inform him that he has been taken off the heart transplant list d/t elevated PRAs. Also discussed that he's still recovering from LVAD surgery. Pt verbalized understanding and denied any questions or concerns at this time.

## 2022-08-30 NOTE — PLAN OF CARE
Discharge Planner Post-Acute Rehab OT:     Discharge Plan: to [facility] with son     Precautions: fall, strernal precautions, LVAD, lucero hose and abdominal binder to be worn due to low BP    Current Status:  ADLs:    Mobility: SBA with walker for short distance    Grooming: setup, close SBA standing    Dressing: setup for UB dressing, set up for LB dressing    Bathing: no showers, UB sponge bath set up assist and close SBA standing, pt declined LB washing    Toileting: SBA for transfer and toileting  IADLs: son can assist  Vision/Cognition: wears glasses, cognition assessment: ACE III score on 8/30 Version US copy A is 65/100. A score of 82 or less indicates suspected cognitive impairment.  Pt scored 11/26 on memory portion of assessment.    Assessment: ACE III cognitive assessment completed. Cognition status updated above in current status.    Other Barriers to Discharge (DME, Family Training, etc): dizziness

## 2022-08-30 NOTE — PLAN OF CARE
FOCUS/GOAL  Medical management    ASSESSMENT, INTERVENTIONS AND CONTINUING PLAN FOR GOAL:  PI 7.2 this morning ( asymptomatic)  Provider aware of the situation. MAP 70 and 80 toward the end of shift. Cardiologist and hospitalist reached by Provider  Regarding LVAD  Issue. Still waiting to hear from them. PM Nurse informed. MOD I in room, patient reported voiding. No BM this shift. Pain managed with scheduled medication. Will continue with POC.    Goal Outcome Evaluation:    Plan of Care Reviewed With: patient     Overall Patient Progress: no change    Outcome Evaluation: No change this shift.

## 2022-08-30 NOTE — LETTER
August 30, 2022    Dandy Sands  [address]  [address] [address]      Dear Mr. Sands,    The purpose of this letter is to let you know the [facility] Multi-Disciplinary Selection Team made the decision to remove you from the heart transplant list.  This is because elevated PRAs.   Important things you should know:    If you would like to discuss the decision, or if your medical status changes, you may call [phone] and ask to speak to your transplant coordinator.    We recommend that you continue to follow up with your primary care and referring physicians in order to manage your health concerns.  Enclosed is a letter from [facility] which describes the services offered to patients by [facility] and the Organ Procurement and Transplantation Network.  Thank you for allowing us to participate in your care.  We wish you well.    Sincerely,    Heart Transplant Team  Enclosure:  OS Letter                 The Organ Procurement and Transplantation Network  Toll-free patient services line:     Your resource for organ transplant information    If you have a question regarding your own medical care, you always should call your transplant hospital first. However, for general organ transplant-related information, you can call the Organ Procurement and Transplantation Network (OPTN) toll-free patient services line at [phone]. Anyone, including potential transplant candidates, candidates, recipients, family members, friends, living donors, and donor family members, can call this number to:          Talk about organ donation, living donation, the transplant process, the donation process, and transplant policies.    Get a free patient information kit with helpful booklets, waiting list and transplant information, and a list of all transplant hospitals.    Ask questions about the OPTN website (https://optn.transplant.hrsa.gov/), the United Network for Organ Sharing s (UNOS) website (https://unos.org/), or  the UNOS website for living donors and transplant recipients. (https://www.transplantliving.org/).    Learn how the OPTN can help you.    Talk about any concerns that you may have with a transplant hospital.    The [facility] s transplant system, the OPTN, is managed under federal contract by the United Network for Organ Sharing (UNOS), which is a non-profit charitable organization. The OPTN helps create and define organ sharing policies that make the best use of donated organs. This process continuously evaluating new advances and discoveries so policies can be adapted to best serve patients waiting for transplants. To do so, the OPTN works closely with transplant professionals, transplant patients, transplant candidates, donor families, living donors, and the public. All transplant programs and organ procurement organizations throughout the country are OPTN members and are obligated to follow the policies the OPTN creates for allocating organs.    The OPTN also is responsible for:      Providing educational material for patients, the public, and professionals.    Raising awareness of the need for donated organs and tissue.    Coordinating organ procurement, matching, and placement.    Collecting information about every organ transplant and donation that occurs in the [place].    Remember, you should contact your transplant hospital directly if you have questions or concerns about your own medical care including medical records, work-up progress, and test results.    We are not your transplant hospital, and our staff will not be able to answer questions about your case, so please keep your transplant hospital s phone number handy.    However, while you research your transplant needs and learn as much as you can about transplantation and donation, we welcome your call to our toll-free patient services line at [phone].          Updated 4/1/2019

## 2022-08-30 NOTE — PROGRESS NOTES
08/30/22 0710   Appointment Canceled   Appointment Canceled Patient declined;Other (see Cancel Comments row)   Cancel Comments Pt declined session stating that he was too tired, woke up multiple  times last night and did not get any sleep. Offered encouragement to get up and do self cares but he continued to decline. -60   Signing Clinician's Name / Credentials   Signing clinician's name / credentials Erica Villareal OTR   Quick Adds   Rehab Discipline OT

## 2022-08-30 NOTE — PLAN OF CARE
Vitals reviewed .  Labs reviewed.  No issues reported per nursing .  Patient was not seen as was working with therapy two times when  I went to see him .

## 2022-08-31 ENCOUNTER — APPOINTMENT (OUTPATIENT)
Dept: OCCUPATIONAL THERAPY | Facility: CLINIC | Age: 62
DRG: 949 | End: 2022-08-31
Attending: PHYSICAL MEDICINE & REHABILITATION
Payer: COMMERCIAL

## 2022-08-31 ENCOUNTER — APPOINTMENT (OUTPATIENT)
Dept: PHYSICAL THERAPY | Facility: CLINIC | Age: 62
DRG: 949 | End: 2022-08-31
Attending: PHYSICAL MEDICINE & REHABILITATION
Payer: COMMERCIAL

## 2022-08-31 LAB
ALBUMIN UR-MCNC: 30 MG/DL
APPEARANCE UR: CLEAR
BACTERIA #/AREA URNS HPF: ABNORMAL /HPF
BILIRUB UR QL STRIP: NEGATIVE
COLOR UR AUTO: YELLOW
GLUCOSE UR STRIP-MCNC: NEGATIVE MG/DL
HGB UR QL STRIP: NEGATIVE
HYALINE CASTS: 3 /LPF
INR PPP: 2.53 (ref 0.85–1.15)
KETONES UR STRIP-MCNC: NEGATIVE MG/DL
LEUKOCYTE ESTERASE UR QL STRIP: NEGATIVE
MUCOUS THREADS #/AREA URNS LPF: PRESENT /LPF
NITRATE UR QL: NEGATIVE
PH UR STRIP: 5.5 [PH] (ref 5–7)
RBC URINE: <1 /HPF
SP GR UR STRIP: 1.01 (ref 1–1.03)
UROBILINOGEN UR STRIP-MCNC: NORMAL MG/DL
WBC URINE: 4 /HPF

## 2022-08-31 PROCEDURE — 97116 GAIT TRAINING THERAPY: CPT | Mod: GP | Performed by: REHABILITATION PRACTITIONER

## 2022-08-31 PROCEDURE — 99232 SBSQ HOSP IP/OBS MODERATE 35: CPT | Performed by: INTERNAL MEDICINE

## 2022-08-31 PROCEDURE — 36415 COLL VENOUS BLD VENIPUNCTURE: CPT

## 2022-08-31 PROCEDURE — 81001 URINALYSIS AUTO W/SCOPE: CPT

## 2022-08-31 PROCEDURE — 97110 THERAPEUTIC EXERCISES: CPT | Mod: GO

## 2022-08-31 PROCEDURE — 250N000013 HC RX MED GY IP 250 OP 250 PS 637: Performed by: INTERNAL MEDICINE

## 2022-08-31 PROCEDURE — 97530 THERAPEUTIC ACTIVITIES: CPT | Mod: GP | Performed by: REHABILITATION PRACTITIONER

## 2022-08-31 PROCEDURE — 250N000013 HC RX MED GY IP 250 OP 250 PS 637: Performed by: PHYSICAL MEDICINE & REHABILITATION

## 2022-08-31 PROCEDURE — 99233 SBSQ HOSP IP/OBS HIGH 50: CPT | Mod: GC | Performed by: PHYSICAL MEDICINE & REHABILITATION

## 2022-08-31 PROCEDURE — 250N000013 HC RX MED GY IP 250 OP 250 PS 637

## 2022-08-31 PROCEDURE — 128N000003 HC R&B REHAB

## 2022-08-31 PROCEDURE — 250N000012 HC RX MED GY IP 250 OP 636 PS 637

## 2022-08-31 PROCEDURE — 85610 PROTHROMBIN TIME: CPT

## 2022-08-31 RX ORDER — LISINOPRIL 5 MG/1
5 TABLET ORAL DAILY
Status: DISCONTINUED | OUTPATIENT
Start: 2022-09-01 | End: 2022-09-01

## 2022-08-31 RX ORDER — HYDROCORTISONE 10 MG/G
CREAM TOPICAL 2 TIMES DAILY PRN
Status: DISCONTINUED | OUTPATIENT
Start: 2022-08-31 | End: 2022-09-03

## 2022-08-31 RX ORDER — LISINOPRIL 2.5 MG/1
2.5 TABLET ORAL DAILY
Status: COMPLETED | OUTPATIENT
Start: 2022-08-31 | End: 2022-08-31

## 2022-08-31 RX ORDER — WARFARIN SODIUM 3 MG/1
6 TABLET ORAL
Status: COMPLETED | OUTPATIENT
Start: 2022-08-31 | End: 2022-08-31

## 2022-08-31 RX ADMIN — SALINE NASAL SPRAY 2 SPRAY: 1.5 SOLUTION NASAL at 17:47

## 2022-08-31 RX ADMIN — HYDROXYCHLOROQUINE SULFATE 200 MG: 200 TABLET, FILM COATED ORAL at 20:42

## 2022-08-31 RX ADMIN — SERTRALINE HYDROCHLORIDE 100 MG: 100 TABLET ORAL at 08:15

## 2022-08-31 RX ADMIN — MYCOPHENOLATE MOFETIL 1500 MG: 500 TABLET, FILM COATED ORAL at 20:42

## 2022-08-31 RX ADMIN — MULTIPLE VITAMINS W/ MINERALS TAB 1 TABLET: TAB at 08:15

## 2022-08-31 RX ADMIN — MYCOPHENOLATE MOFETIL 1500 MG: 500 TABLET, FILM COATED ORAL at 08:15

## 2022-08-31 RX ADMIN — ACETAMINOPHEN 975 MG: 325 TABLET, FILM COATED ORAL at 14:15

## 2022-08-31 RX ADMIN — LISINOPRIL 2.5 MG: 2.5 TABLET ORAL at 08:15

## 2022-08-31 RX ADMIN — LISINOPRIL 2.5 MG: 2.5 TABLET ORAL at 08:39

## 2022-08-31 RX ADMIN — OXYCODONE HYDROCHLORIDE 5 MG: 5 TABLET ORAL at 21:13

## 2022-08-31 RX ADMIN — ACETAMINOPHEN 975 MG: 325 TABLET, FILM COATED ORAL at 21:11

## 2022-08-31 RX ADMIN — ATORVASTATIN CALCIUM 40 MG: 40 TABLET, FILM COATED ORAL at 20:42

## 2022-08-31 RX ADMIN — Medication 10 MG: at 21:11

## 2022-08-31 RX ADMIN — HYDROXYCHLOROQUINE SULFATE 200 MG: 200 TABLET, FILM COATED ORAL at 08:15

## 2022-08-31 RX ADMIN — QUETIAPINE FUMARATE 25 MG: 25 TABLET ORAL at 08:15

## 2022-08-31 RX ADMIN — SALINE NASAL SPRAY 2 SPRAY: 1.5 SOLUTION NASAL at 06:35

## 2022-08-31 RX ADMIN — QUETIAPINE FUMARATE 25 MG: 25 TABLET ORAL at 15:55

## 2022-08-31 RX ADMIN — Medication 37.5 MG: at 20:42

## 2022-08-31 RX ADMIN — ASPIRIN 81 MG: 81 TABLET, CHEWABLE ORAL at 08:15

## 2022-08-31 RX ADMIN — WARFARIN SODIUM 6 MG: 3 TABLET ORAL at 17:46

## 2022-08-31 RX ADMIN — QUETIAPINE FUMARATE 150 MG: 50 TABLET ORAL at 21:11

## 2022-08-31 RX ADMIN — FLUTICASONE FUROATE AND VILANTEROL TRIFENATATE 1 PUFF: 100; 25 POWDER RESPIRATORY (INHALATION) at 08:16

## 2022-08-31 RX ADMIN — Medication 37.5 MG: at 14:15

## 2022-08-31 RX ADMIN — SALINE NASAL SPRAY 2 SPRAY: 1.5 SOLUTION NASAL at 14:16

## 2022-08-31 RX ADMIN — PANTOPRAZOLE SODIUM 40 MG: 40 TABLET, DELAYED RELEASE ORAL at 06:32

## 2022-08-31 RX ADMIN — Medication 37.5 MG: at 06:58

## 2022-08-31 RX ADMIN — SALINE NASAL SPRAY 2 SPRAY: 1.5 SOLUTION NASAL at 21:13

## 2022-08-31 RX ADMIN — DIGOXIN 125 MCG: 125 TABLET ORAL at 08:15

## 2022-08-31 RX ADMIN — ACETAMINOPHEN 975 MG: 325 TABLET, FILM COATED ORAL at 06:32

## 2022-08-31 NOTE — PLAN OF CARE
"Discharge Planner Post-Acute Rehab PT:     Discharge Plan: [facility] with son. Home care PT, pending insurance.     Precautions: Sternal, falls     Current Status:  Bed Mobility: IND  Transfer: independent sit to/from stand without device from EOB, armchair & toilet - with LVAD powered by base  Gait: up to 200 ft, SBA with FWW  Stairs: 4 stairs (6''), CGA - demonstrated ability to ascend/descend 1 step (6\") with & without rail x 6 reps with SBA on 8/30/22  Balance: David on 8/26: 37/56  2MWT on 8/26: 127 ft with FWW    Assessment: patient able to demonstrate ability to independently change from battery power to base power unit on the LVAD & then complete sit to/from stand transfers from EOB, armchair & toilet without device & safely managing the power cords - will progress to independence within room following confirmation with OT team of safe ADLs while LVAD on base power.   8/31- pt needing encouragement to participate with PT. Pt limited in there ex but willing to walk. Pt still limited by weakness and fatigue   Other Barriers to Discharge (DME, Family Training, etc):   Family training to be scheduled.  Anticipate 4WW to be issued.                       "

## 2022-08-31 NOTE — PLAN OF CARE
Discharge Planner Post-Acute Rehab OT:     Discharge Plan: to [facility] with son     Precautions: fall, strernal precautions, LVAD, lucero hose and abdominal binder to be worn due to low BP    Current Status:  ADLs:    Mobility: SBA with walker for short distance    Grooming: setup, close SBA standing    Dressing: setup for UB dressing, set up for LB dressing    Bathing: no showers, UB sponge bath set up assist and close SBA standing, pt declined LB washing    Toileting: SBA for transfer and toileting  IADLs: son can assist  Vision/Cognition: wears glasses, cognition assessment: ACE III score on 8/30 Version US copy A is 65/100. A score of 82 or less indicates suspected cognitive impairment.  Pt scored 11/26 on memory portion of assessment.    Assessment: Pt tolerated minimal OT activity today, limited by fatigue but willing to participate in PM session for short session.    Other Barriers to Discharge (DME, Family Training, etc): dizziness

## 2022-08-31 NOTE — PROGRESS NOTES
[facility]    Medicine Progress Note - Hospitalist Service    Date of Admission:  8/21/2022    Assessment & Plan           8/31  Spoke with HF   Increase lisinopril to 5 mg for higher MAP and continue to titrate up as he tolerates to 10 mg     Dandy Sands is a 60 yo male with medical history of SLE, antiphospholipid antibody syndrome, PE on anticoagulation with warfarin, HFrEF due to ICM and HDL.  He initially presented to [facility] in [place] on 6/13/22 with nausea, vomiting and abd bloating.  Transferred to [facility] on 6/17/22 w/ decompensated heart failure, cardiogenic shock and multiorgan failure.  He required IABP and then underwent HM3 LVAD placement on 7/8/22 by Dr. Zamora.  Surgery was complicated with RV failure requiring 29F Protek duo via RIJ RVAD placement ( removed 7/21/22), hemopericardium requiring repeat washout, chest was closed 7/13/22.  Other post LVAD placement complication included epistaxis requiring intubation 8/7/22 for airway protection.  Pt removed his own nasal packing.  He was eventually extubated.  He was transferred to acute inpatient rehab 8/21/2022 for further rehab and cares       HFrEF 2/2 ICM s/p HM3 LVAD in setting of cardiogenic shock   RV failure s/p Protek duo temporary RVAD, s/p decannulation 7/21  RV thrombus  Post chest closure hemopericardium, s/p repeat washout (7/12)  H/o CAD s/p PCI to LAD (2005), S/p CRT-D (2006), NTEMI 2007  ---   Coronary angio 5/2022 showed severe ostial LAD disease with in-stent restenosis, mid LAD dz involved diagonal bifurcation, mild LCx dz and severe prox RCA disease   ---   Evaluated for heart transplant but due to elevated DSAs, decision was made to proceed with LVAD likely as destination therapy  ---   CABG was considered but decision was made for medical therapy in light of his low EF and cardiac MRI viability study showed nonviable myocardium in LAD territory  "   ---   TAVARES 8/20 reveled EF 10-15%  ---   Continued to have intermittent low flow alarm but previous w/u were negative   ---   Encouraged pt to increase his oral intake  ---   Continue hydralazine 37.5 tid and lisinopril 5 mg daily for afterload reduction  ---   Continue ASA, digoxin and atorvastatin  ---   Pt to wear abd binder and compression stocking  ---   Goal MAP 65-85  ---   On coumadin for RV thrombus w/ INR goal 2-3.    ---   Needs EP evaluation for increasing capture threshold of RV lead        Acute hypoxic respiratory failure ; resolved  ---   Intubated 8/7 for airway protection due to severe epistaxis  ---   Extubated 8/8 w/o any difficulties  ---   Has underlying mild-moderate Emphysema  ---   Seen by pulm consult service at the [place]  ---   Continue Breao Ellipta, albuterol inhaler as needed, PFT as able     ---   Iatrogenic apical PTX, resolved    ---   Respiratory failure resolved     PE  H/o antiphospholipid antibody syndrome  ---   Continue Coumadin, pharmacy is dosing       Epistaxis  ---   Due to Coumadin + ASA  ---   Has a history of nasal perforation and underwent elective procedure in [place]  ---   He required intubation for airway protection   ---   Seen by ENT consult service and underwent catheterization   ---   Patient removed his own nasal packing  ---   Extubated w/o any difficulties  ---   Epistaxis resolved  ---   Per ENT \" If bleeding recurs, spray Afrin (3 sprays) and hold firm digital pressure over the soft part of the nose for 20 minutes continuously.  Nasal clips are ineffective and should not be used in place of digital pressure.  If bleeding continues despite these measures, repeat. If bleeding continues or is severe please contact ENT\"     E.faecalis bacteremia  ---   Blood culture from PICC line (8/2/22), 1 out 2 bottles grew staph epi (probably contaminant) and the other bottle grew E. Faecalis.    ---   NGTD from subsequent blood cultures   ---   He received " Cefepime and Vancomycin, 8/7 - 8/12/22  ---   Treated for oral thrush for 14 days   ---   Has a h/o COVID-19 infection 1/2022 ( needed intubation )        Hematemesis  Black tarry stool   ---   He was seen by GI, felt melena was due to epistaxis and not GI bleed   ---   Congestive hepatopathy, shocked liver on presentation, now LFT back to normal    ---   Dysphasia post intubation, resolved   ---   On regular diet   ---   Continue PPI      Extremity and facial numbness 8/22/22  ---   Had no focal neurologic deficit  ---   CT head without contrast was negative for acute intracranial pathology  ---   Resolved     Delirium   Depression  Insomnia  ---   CT head negative for acute intracranial pathology 8/7/22   Continue cares     Acute blood loss anemia  superimposed on BRENDA  ---   Hgb stable   ---   Not on Fe supplement     SLE  ---   Continue PTA hydroxychloroquine and cellcept 1500 mg po bid      Severe Malnutrition  ---   Based on nutrition assessment   ---   Nutritional supplement being provided                 Diet: Room Service  Combination Diet Regular Diet; Thin Liquids (level 0)  Snacks/Supplements Adult: Ensure Enlive; With Meals    DVT Prophylaxis: Warfarin  Fitzgerald Catheter: Not present  Central Lines: None  Cardiac Monitoring: None  Code Status: Full Code      Disposition Plan     Expected Discharge Date: 09/03/2022      Destination: home          The patient's care was discussed with the HF  Consultant and Primary team.    Humaira Bey MD  Hospitalist Service  [facility]  Securely message with the Vocera Web Console (learn more here)  Text page via [employer] Paging/Directory         Clinically Significant Risk Factors Present on Admission                      ______________________________________________________________________    Interval History   Some higher PI noted last night   No cp   No dizzyness  No fever   No chills      Data reviewed today: I  reviewed all medications, new labs and imaging results over the last 24 hours.     Physical Exam   Vital Signs: Temp: (!) 95.3  F (35.2  C) Temp src: Oral  Pulse: 67   Resp: 16 SpO2: 97 % O2 Device: None (Room air)     Heartmate 3 LEFT VS  Flow (Lpm): 3.1 Lpm  Pulse Index (PI): (!) 7.9 PI  Speed (rpm): 5200 rpm  Power (persaud): 3.7 persaud  Weight: 133 lbs 9.6 oz  Constitutional: awake, alert, cooperative, no apparent distress, and appears stated age  Eyes: Lids and lashes normal, pupils equal, round and reactive to light, extra ocular muscles intact, sclera clear, conjunctiva normal  ENT: Normocephalic, without obvious abnormality, atraumatic, sinuses nontender on palpation, external ears without lesions, oral pharynx with moist mucous membranes,   Hematologic / Lymphatic: no cervical lymphadenopathy  Respiratory: No increased work of breathing, good air exchange, clear to auscultation bilaterally, no crackles or wheezing  Cardiovascular: Normal apical impulse, regular rate and rhythm . LVAD sounds  GI: No scars, normal bowel sounds, soft, non-distended, non-tender,   Skin: no jaundice  Neurologic: Awake, alert, oriented to name, place and time.    Neuropsychiatric: General: normal, calm and normal eye contact    Data   Recent Labs   Lab 08/31/22  0637 08/30/22  0554 08/29/22  0804 08/28/22  0747 08/27/22  0816   WBC  --   --  6.8  --  5.6   HGB  --   --  10.9*  --  10.5*   MCV  --   --  93  --  92   PLT  --   --  304  --  289   INR 2.53* 2.72* 2.32*   < > 2.19*   NA  --   --  140  --  139   POTASSIUM  --   --  3.9  --  3.8   CHLORIDE  --   --  108  --  111*   CO2  --   --  24  --  23   BUN  --   --  14  --  11   CR  --   --  0.69  --  0.59*   ANIONGAP  --   --  8  --  5   ELDA  --   --  9.6  --  9.2   GLC  --   --  104*  --  101*   ALBUMIN  --   --  3.2*  --   --    PROTTOTAL  --   --  8.1  --   --    BILITOTAL  --   --  0.5  --   --    ALKPHOS  --   --  156*  --   --    ALT  --   --  23  --   --    AST  --   --  24   --   --     < > = values in this interval not displayed.

## 2022-08-31 NOTE — PLAN OF CARE
FOCUS/GOAL  Bowel management, Bladder management, Pain management and Cognition/Memory/Judgment/Problem solving    ASSESSMENT, INTERVENTIONS AND CONTINUING PLAN FOR GOAL:  Pt is alert and oriented x4, denies fever, chills, chest pain, SOB, N/V, abdominal pain, or new weakness/numbness/tingling, continent of bladder using urinal in bed. MAP of 98 taken overnight. Hospitalist paged with this value with no immidiate action advised if pt is asymptomatic.  Transferring nikolas ww once on battery power. PIV in RUE. Drive line dressing intact but anchor found nearly detached so it was replaced. no further care concerns at this time continue with POC.         Goal Outcome Evaluation: Ongoing.

## 2022-08-31 NOTE — PLAN OF CARE
"Discharge Planner Post-Acute Rehab PT:     Discharge Plan: [facility] with son. Home care PT, pending insurance.     Precautions: Sternal, falls     Current Status:  Bed Mobility: IND  Transfer: independent sit to/from stand without device from EOB, armchair & toilet - with LVAD powered by base  Gait: up to 200 ft, SBA with FWW  Stairs: 4 stairs (6''), CGA - demonstrated ability to ascend/descend 1 step (6\") with & without rail x 6 reps with SBA on 8/30/22  Balance: David on 8/26: 37/56  2MWT on 8/26: 127 ft with FWW    Assessment: patient able to demonstrate ability to independently change from battery power to base power unit on the LVAD & then complete sit to/from stand transfers from EOB, armchair & toilet without device & safely managing the power cords - will progress to independence within room following confirmation with OT team of safe ADLs while LVAD on base power.     Other Barriers to Discharge (DME, Family Training, etc):   Family training to be scheduled.  Anticipate 4WW to be issued.                       "

## 2022-08-31 NOTE — CARE CONFERENCE
Acute Rehab Care Conference/Team Rounds      Type: Team Rounds    Present: Dr. Abner Mathew, Padmini Linda Resident MD, Samina Lopez PT, Erica Dennison OT, Lisa KING, Layne Liang RN CC, Virginia Schroeder RD, Claude Zelaya RN, and Dandy Shavon Patient.    Discharge Barriers/Treatment/Education    Rehab Diagnosis: 09 Cardiac; s/p HM3 LVAD    Active Medical Co-morbidities/Prognosis:   Patient Active Problem List   Diagnosis     Decompensated heart failure (H)     Cardiogenic shock (H)     LVAD (left ventricular assist device) present (H)     Chronic systolic congestive heart failure (H)     Heart failure with reduced ejection fraction, NYHA class III (H)        Safety: Alarms present at night, call light in reach. LVAD driveline precautions.    Pain: Pt c/o intermittent low back pain. Managed with oxycodone and tylenol.    Medications, Skin, Tubes/Lines: takes pills whole. Sternal incision present, WDL. Driveline site, dressing changed per orders.     Swallowing/Nutrition:    Bowel/Bladder: Cont of B&B, LBM 8/31. Uses urinal at bedside.    Psychosocial: [place] Planning to stay local with family at [facility] at discharge from ARU. Indep PTA. Some non-compliance and irritability while on ARU. Good support from family, adult son and dtr. Works as a . Applied for SSDI. .     ADLs/IADLs: Pt is mod I in the room when on battery. Pt has been limited in full participation of therapy due to fatigue. Pt scored a 65/100 on the ACE III cognitive screen showing deficits in all areas but most deficit in memory portion. Recommend oversight with meds and other higher level IADL. Pt will have family supervision for at least 30 days at [facility]. Also recommend OP OT to continue addressing cognition if pt cannot get Home care.     Mobility:   Bed Mobility: IND  Transfer: independent sit to/from stand without device from EOB, armchair & toilet - with LVAD powered by  Date of Service: 07/08/2020    The patient is coming in for a routine physical examination.  The patient has no specific complaints today.  The patient unfortunately has continued to smoke, though.  The patient walks 3 miles a day.  He has not had any problem with chest pain, chest pressure, dizziness, lightheadedness or palpitations.  He denies any significant muscle aches.  He does have some urinary frequency at times but denies any nocturia.  The patient notes that he has not had any pain or blood in his urine.  He denies any problems with difficulty starting or stopping urinating.  The patient has been compliant with his medications.    PAST SOCIAL HISTORY, FAMILY HISTORY, ALLERGIES, MEDICATIONS, PAST MEDICAL HISTORY, REVIEW OF SYSTEMS AND PHYSICAL EXAMINATION:  On the note.    ASSESSMENT:  1.  Routine physical examination in a 62-year-old male.  The patient declines colon cancer screening, both with a colonoscopy and Cologuard.  He notes his insurance does not cover this, and he does not want to pay for it.  His blood pressure is under good control.  His weight needs to be better.  I want him down to around 195.  I want him to follow a low-glycemic index which we did talk about.  He does not need any current immunizations until he turns 66.  The patient exercises routinely.  He does need a comprehensive metabolic panel, a lipid panel and a PSA.  He does not need an EKG.  The patient does smoke and wants to quit.  His bone density is not an issue.  He is aware of his family history regarding heart disease in his sister.  He gives no signs or symptoms of sleep apnea.  He does take a baby Aspirin a day, has no problems with depression.  2.  Coronary artery disease status post bypass.  The patient needs to quit smoking.  He is on statin.  His blood pressure is under good control.  He takes a Baby Aspirin.  3.  Increased urinary frequency.  I am going to check a PSA and a urine on him to make sure there is not blood  "base  Gait: up to 200 ft, SBA with FWW  Stairs: 4 stairs (6''), CGA - demonstrated ability to ascend/descend 1 step (6\") with & without rail x 6 reps with SBA on 8/30/22  Balance: David on 8/26: 37/56  2MWT on 8/26: 127 ft with FWW      Family training to be scheduled.  Anticipate 4WW to be issued.   OP Setup at [facility]    Cognition/Language:    Community Re-Entry:    Transportation: Not a  - family to provide.    Decision maker: self    Plan of Care and goals reviewed and updated.    Discharge Plan/Recommendations    Fall Precautions: discontinue    Patient/Family input to goals: Yes    Anticipated rehab needs following discharge: Home with Family    Anticipated care giver support after discharge: family    Estimated length of stay: 9/2/22    Overall plan for the patient: Continue IP Rehabilitation.       Utilization Review and Continued Stay Justification    Medical Necessity Criteria:    For any criteria that is not met, please document reason and plan for discharge, transfer, or modification of plan of care to address.    Requires intensive rehabilitation program to treat functional deficits?: Yes    Requires 3x per week or greater involvement of rehabilitation physician to oversee rehabilitation program?: Yes    Requires rehabilitation nursing interventions?: Yes    Patient is making functional progress?: Yes    There is a potential for additional functional progress? Yes    Patient is participating in therapy 3 hours per day a minimum of 5 days per week or 15 hours per week in 7 day period?:No    Medical exception week one d/t limited tolerance r/t LVAD parameters outside normal, poor tolerance to therapies, and medical management of bleeding risks. Anticipate improved tolerance once these issues are resolved.     Has discharge needs that require coordinated discharge planning approach?:Yes      Final Physician Sign off    Statement of Approval: I approve the plan of care.     Patient Goals  Social " in his urine.  4.  Tobacco abuse.  The patient needs to quit smoking.  We discussed options.  He elects to go with Chantix.  Side effects of medication were discussed.  Will do a 3-month episode of that and go from there.      Dictated By: Jack Perez MD  Signing Provider: Jack Perez MD    RL/ps ([id number])  DD: 07/08/2020 16:31:14 TD: 07/09/2020 04:20:03    Copy Sent To:    Work Goals: RN CC following s/p transplant. No SW needs identified at this time.       Therapy Frequency (OT): Daily  OT: Hygiene/Grooming: modified independent  OT: Upper Body Dressing: Modified independent  OT: Lower Body Dressing: Modified independent  OT: Upper Body Bathing: Modified independent  OT: Lower Body Bathing: Modified independent  OT: Toilet Transfer/Toileting: Modified independent, cleaning and garment management, toilet transfer  OT: Meal Preparation: Modified independent, with simple meal preparation           OT: Goal 1: Pt will be  MOD I with UB exercises program to build strength and endurance for ADL, within sternal precautions (Pt will be  MOD I with UB exercises program to build strength and endurance for ADL, within sternal precautions.)                             PT Predicted Duration/Target Date for Goal Attainment: 08/31/22  PT Frequency: Daily  PT: Bed Mobility: Independent  PT: Transfers: Independent, Within precautions  PT: Gait: Independent, Greater than 200 feet  PT: Stairs: Independent, 2 stairs  PT: Perform aerobic activity with stable cardiovascular response: continuous activity, 20 minutes  PT: Goal 1: Pt will be able to complete car transfer with SBA in order to ensure safety with d/c home.                                                                                  RN Goal:  Pt will pass MAP by discharge from ARU.  RN Goal:  Pt will advocate for pain management throughout stay at ARU as evidence by pt not refusing therapy due to pain.  RN Goal:  Pt and family will pass LVAD dressing change training before discharge from ARU.

## 2022-08-31 NOTE — PLAN OF CARE
Spoke with RN and HF LAURA about elevated MAP  Added extra lisinopril  Call VAD coordinator for low flow alarm   Continue to monitor

## 2022-08-31 NOTE — PLAN OF CARE
Goal Outcome Evaluation:    Plan of Care Reviewed With: patient     Overall Patient Progress: no change    Outcome Evaluation: Pt continues to advocate for pain management, pt is knowledgable of LVAD dressing change.      FOCUS/GOAL  Medication management, Pain management, and Wound care management     ASSESSMENT, INTERVENTIONS AND CONTINUING PLAN FOR GOAL:  Pt Aox4, using call light to make needs known.  Pt is REJI durring day if hooked to batteries, pt is compliant with calling when not on batteries.  Denies pain at beginning of shift, requested PRN oxy at HS for back pain which was effective.  LVAD parameters met for everything except MAP which has been elevated again this evening, PMR aware from sticky note yesterday, PMR oncall updated monitoring for now, PI is also fluctuating which has been noted by PMR team on AM shift.  LVAD dressing change done on writers shift. Cont of B&B normally but was incontinent of bowel on writers shift due to being hooked to wall and waiting too long for staff to assist, pt would like to be IND at all times, LBM 8/30. Reg/thin, taking pills whole with water.  Nursing will continue with POC.

## 2022-08-31 NOTE — PROGRESS NOTES
Called regarding elevated MAP (98) and PI (7.4), asymptomatic. Appears this has happened before in the last few days. As he is otherwise stable, will defer to day team to discuss with cardiology.     Celine Hanson MD  Internal Medicine-Pediatrics   [facility]

## 2022-08-31 NOTE — PROGRESS NOTES
"  [facility]   Acute Rehabilitation Unit  Daily progress note    INTERVAL HISTORY  Dandy Sands was seen and examined at bedside.  He had another difficult night of sleep with frequenct urination due to urgency, no dysuria, suprapubic tenderness and has been ongoing since admission. He also endorsed the occasional dizziness when his says his BP was 104 today. Otherwise he has remained stable and without other symptoms, able to tolerate his therapies. He says he had bright red stools today during BM, did not inform medicine or nursing. He denies straining with BM. His CBC 8/29 stable 10.9, INR is therapeutic at 2.53 today, and currently he is w/o associated dizziness, palpitations or ongoing bleeding since BM. Notes it's the first time. He did not have epistaxis today. Asked patient to inform nursing during next BM. He is otherwise looking forward to the rest of his later therapy sessions. Denies CP, SOB, Abdominal pain or LUTS.  LBM 8/31.    Functionally,   PT 8/30  Bed Mobility: IND  Transfer: independent sit to/from stand without device from EOB, armchair & toilet - with LVAD powered by base  Gait: up to 200 ft, SBA with FWW  Stairs: 4 stairs (6''), CGA - demonstrated ability to ascend/descend 1 step (6\") with & without rail x 6 reps with SBA on 8/30/22  Balance: David on 8/26: 37/56  2MWT on 8/26: 127 ft with FWW    Today's Updates:  - UA with microscopy. IF negative will not order Abx or culture.  -  Monitor BM for bleeds. If he has another bleed might benefit from repeat CBC, and DENISSE.    MEDICATIONS    acetaminophen  975 mg Oral Q8H     aspirin  81 mg Oral Daily     atorvastatin  40 mg Oral QPM     digoxin  125 mcg Oral Daily     fluticasone-vilanterol  1 puff Inhalation Daily     hydrALAZINE  37.5 mg Oral TID     hydroxychloroquine  200 mg Oral BID     [START ON 9/1/2022] lisinopril  5 mg Oral Daily     melatonin  10 mg Oral At Bedtime     multivitamin w/minerals  1 tablet " "Oral Daily     mycophenolate  1,500 mg Oral BID     pantoprazole  40 mg Oral QAM AC     QUEtiapine  150 mg Oral or Feeding Tube At Bedtime     QUEtiapine  25 mg Oral BID     sertraline  100 mg Oral Daily     sodium chloride  2 spray Both Nostrils Q4H While awake     sodium chloride (PF)  5 mL Intracatheter Q8H     warfarin ANTICOAGULANT  6 mg Oral ONCE at 18:00     Warfarin Therapy Reminder  1 each Oral See Admin Instructions        albuterol, LORazepam, naloxone **OR** naloxone **OR** naloxone **OR** naloxone, ondansetron, oxyCODONE, polyethylene glycol, QUEtiapine, senna-docusate     PHYSICAL EXAM  BP (!) 79/65 (BP Location: Left arm)   Pulse 67   Temp (!) 95.3  F (35.2  C) (Oral)   Resp 16   Ht 1.702 m (5' 7\")   Wt 60.6 kg (133 lb 9.6 oz)   SpO2 97%   BMI 20.92 kg/m    General: Seated up in bed, comfortable, No acute distress  HEENT: EOMI, NC/NT, Moist mucous membranes  Pulmonary: Breathing comfortably on room air, clear to auscultation bilaterally  Cardiovascular: LVAD humming noted.   Abdominal: Soft, Nt/ND, + BS, Dry clean abdominal bandages.   Extremities: warm, well perfused, no edema in bilateral lower extremities, no tenderness in calves power 5/5 in BUE and BLE.  Neuro/MSK: Alert and oriented, face symmetric.     LABS  Recent Results (from the past 24 hour(s))   INR    Collection Time: 08/31/22  6:37 AM   Result Value Ref Range    INR 2.53 (H) 0.85 - 1.15       Rehabilitation - continue comprehensive acute inpatient rehabilitation program with multidisciplinary approach including therapies, rehab nursing, and physiatry following. See interval history for updates.      ASSESSMENT AND PLAN  Dandy Sands is a 61 year old right hand dominant male with past medical history of SLE, antiphospholipid syndrome, history pulmonary embolism (on anticoagulation with warfarin), HFrEF due to IMC, HDL. He initially presented to [facility] on 6/13 due to nausea, vomiting, abdominal bloating and " transferred to [facility] for admission on 6/17/2022 for decompensated heart failure 2/2 cardiogenic shock c/b multiorgan failure requiring IABP, is s/p HM 3 LVAD on 7/8/2022. Stay was also c/b RV, had 29F Protek duo via RIJ, RVAD removed 7/21, hemopericardium requiring repeat washout, chest was closed 7/13. Other complication epistaxis on 8/6 ENT. Patient has h/o nasal perforation that required elective procedure (in [place]) which was postponed due to LVAD insertion.  He required intubation 8/7 for airway protection, was extubated 8/8.  Nasal pack removed 8/9.     Admission to acute inpatient rehab: Cardiac; s/p HM3 LVAD.    Impairment group code: 09        1. PT, OT 90 minutes of each on a daily basis, in addition to rehab nursing and close management of physiatrist.       2. Impairment of ADL's: Impairment in strength, endurance, and ROM resulting in functional limitations in patient's ability to perform ADLs.     3. Impairment of mobility:  Impairment in strength, endurance, and ROM resulting in functional limitations in patient's ability to perform functional mobility.     4. Impairment of cognition/language/swallow:  N/A     5. Medical Conditions     #HFrEF 2/2 ICM s/p HM3 LVAD in setting of cardiogenic shock (SCAI D)  #RV failure s/p Protek duo temporary RVAD s/p decannulation 7/21  #RV thrombus  #Post chest closure hemopericardium s/p repeat washout (7/12)  #PMH CAD s/p PCI to LAD (2005)  #S/p CRT-D (2006)  Patient with ICM s/p HM3 LVAD 7/8/22 2/2 cardiogenic shock requiring MCS with IABP as prior to HM (initially evaluated for heart transplant, however noted to have substantially elevated DSAs during course of care, so decision made to proceed with LVAD given patient was already on temporary MCS). Intraoperative course during LVAD placement was c/b RVF resulting in cardiogenic shock requiring high dose pressors necessitating temporary RVAD support. Initial post-op course c/b hemopericardium requiring repeat  washout with chest left open, with good recovery extubation and decannulation on 7/21. Patient tolerating hemodynamics and end organ standpoint well. He has had intermittent low flow alarms with high PI, no power spike, and a closed aortic valve on TTE. TTE also showed underfilling LV which could explain his low flow alarms d/t suckdown event so his LVAD speed was reduced to 5200. Patient did have a low flow alarm on 8/20 @ 1145 (PI 8-9 and not hypotensive); CTA chest done which showed that the LVAD outflow tract is widely patent. Patient is euvolemic on exam, but at times has had orthostasis symptoms; patient encouraged to stay hydrated within his fluid/diet restriction guidelines (no more than 2L/day of fluid intake and no more than 3g of Na per day in dietary intake).  P:  LVAD: Appreciate   - interrogation showed 1 low flow alarm in last 24 hours as described above  - Is euvolemic since 8/21  - Diuretics: Not needed at this time; monitor volume status and weight and consider starting low dose Lasix if he gains weight or starts showing signs of hypervolemia  - Afterload reduction: MAP goal 65-80; Hydralazine 37.5 TID + Lisinopril 2.5 qday  - Statin: Atorvastatin 40mg  - BB: Holding in setting of recent cardiogenic shock and orthostatic symptoms, consider restarting outpatient   - AA: Holding in setting of recent cardiogenic shock and orthostatic symptoms, consider restarting outpatient  - SGLTi: As outpatient, has been held due to immunosuppression he is on for his SLE.  - Warfarin for INR goal 2-3. INR 2.53 today  - ASA 81 mg qday, and Digoxin     #Epistaxis, resolved  #Intubated due to Concern for airway protection (8/7)-Extubated (8/8)  #Mild-moderate emphysema  #History of COVID-19  #Iatrogenic R apical PTX  H/o nasal perforation  For elective procedure (in [place]), postponed 2/2 LVAD insertion. Epistaxis not fixed by packing overnight on 8/6 and pt continued to bleed.  Intubated for airway protection. Controlled at bedside by ENT s/p cautery and packing. Extubated 8/8. Had Cefepime/Vanc empirically for broad coverage for PNA (8/7-8/12). Patient removed his own nasal packing overnight on 8/9. ENT no longer following.    - C/t Ayr nasal saline gel at bedtime + nasal saline q4H while awake 8/24  - Continue as needed inhalers, PFT as able  --- Breo Ellipta discontinued 8/25.  Patient not using.  -  followup with his ENT as OP (see below)     #Insomnia/Delirium, resolved  Some concern that he had a fall overnight on 8/6. Discussed with nursing staff and he did NOT have a fall, although he did try to get out of bed. CT head was obtained (8/7) and it was unremarkable. Patient had some issues with delirium and insomnia during hospitalization and psychiatry was consulted who recommended  - Sertraline and Quetiapine per psych recs     # ? Orthostatic  Noted to be dizzy with therapy.  Has occasional hypotension with SBP high 80s.  May benefit from compressions.  -Compression socks and, abdominal binder 8/22     #GERD  #Congestive hepatopathy in the setting of cardiac insuffiency - Resolved  #Hematemesis / oral mucosal bleeding -resolved    #Dysphagia  GI consulted 7/31 for black tarry stools and a possible GI bleed, however it is more likely swallowed blood from epistaxis that patient previously had. GI did not recommend an upper endoscopy. Recurrence of bloody stools likely due to epistaxis which have resolved. Hemoglobin was monitored and continued to be stable throughout the rest of the hospitalization. Cleared for regular diet by SLP.  - 8/31 monitor for hematochezia. CBC in AM and monitor INR. Will do DENISSE if another episode is noted.     #Anemia due to blood loss from Epistaxis-Resolved  #History of DVT and PE   #Antiphospholipid antibody syndrome  #History of iron deficiency anemia  As noted above, likely related to epistaxis. After above interventions were complete and management was  initiated per ENT recs, hemoglobin remained stable and no further evidence of bleeding.  - Saline (OCEAN) nasal sprays Q4H when awake.     #SLE  - PTA meds: hydroxychloroquine 200mg, resumed 7/16  - Restart PTA Cellcept at discharge     #Oral thrush, resolved  Completed 14 days of nystatin        6. Adjustment to disability:  Clinical psychology to eval and treat as needed  7. FEN:  Regular with thins  8. Bowel: As needed MiraLAX and Senokot  9. Bladder: Pending review  10. DVT Prophylaxis: Warfarin goal 2-3 for LVAD, daily INR, and ASA 81  11. GI Prophylaxis: Pantoprazole 40 mg daily  12. Code: Full  13. Disposition: Home with homecare and Home with outpatient therapy  14. ELOS: 12 to 14 days.  15. Rehab prognosis: Fair   16. Follow up Appointments on Discharge:   - PCP follow-up 2 to 3 weeks : Requires CBC BMP  - Follow-up with [facility] at next available appointment and follow-up with your cardiologist in the next few months at next available appointment   - Follow-up with psychiatry post discharge  - Followup with his ENT in [place] to rediscuss possible procedure for his nasal perforation vs. conservative management        Patient seen and discussed with Dr. Mathew, PM&R Staff Physician    Padmini Linda MD   Resident Physician, PGY-2   Physical Medicine and Rehabilitation  [facility]

## 2022-08-31 NOTE — PLAN OF CARE
"Goal Outcome Evaluation:    Plan of Care Reviewed With: patient     Overall Patient Progress: no change    Outcome Evaluation: Pt continuing to have elevated PI and MAP. MD aware and cardiology and LVAD team notified. Pt is continuing to be able to demonstrate LVAD cares and procedures. Pt stated bloody stool, unable to see. Requested that pt save future stools to check.    BP (!) 79/65 (BP Location: Left arm)   Pulse 67   Temp (!) 95.3  F (35.2  C) (Oral)   Resp 16   Ht 1.702 m (5' 7\")   Wt 60.6 kg (133 lb 9.6 oz)   SpO2 97%   BMI 20.92 kg/m    Most Recent MAP: 98 via doppler  "

## 2022-09-01 ENCOUNTER — APPOINTMENT (OUTPATIENT)
Dept: PHYSICAL THERAPY | Facility: CLINIC | Age: 62
DRG: 949 | End: 2022-09-01
Attending: PHYSICAL MEDICINE & REHABILITATION
Payer: COMMERCIAL

## 2022-09-01 ENCOUNTER — APPOINTMENT (OUTPATIENT)
Dept: OCCUPATIONAL THERAPY | Facility: CLINIC | Age: 62
DRG: 949 | End: 2022-09-01
Attending: PHYSICAL MEDICINE & REHABILITATION
Payer: COMMERCIAL

## 2022-09-01 ENCOUNTER — DOCUMENTATION ONLY (OUTPATIENT)
Dept: OTHER | Facility: CLINIC | Age: 62
End: 2022-09-01

## 2022-09-01 LAB
ALBUMIN SERPL-MCNC: 3.1 G/DL (ref 3.4–5)
ALP SERPL-CCNC: 131 U/L (ref 40–150)
ALT SERPL W P-5'-P-CCNC: 22 U/L (ref 0–70)
ANION GAP SERPL CALCULATED.3IONS-SCNC: 6 MMOL/L (ref 3–14)
AST SERPL W P-5'-P-CCNC: 21 U/L (ref 0–45)
BASOPHILS # BLD AUTO: 0 10E3/UL (ref 0–0.2)
BASOPHILS NFR BLD AUTO: 1 %
BILIRUB SERPL-MCNC: 0.4 MG/DL (ref 0.2–1.3)
BUN SERPL-MCNC: 13 MG/DL (ref 7–30)
CALCIUM SERPL-MCNC: 9.4 MG/DL (ref 8.5–10.1)
CHLORIDE BLD-SCNC: 112 MMOL/L (ref 94–109)
CO2 SERPL-SCNC: 22 MMOL/L (ref 20–32)
CREAT SERPL-MCNC: 0.56 MG/DL (ref 0.66–1.25)
EOSINOPHIL # BLD AUTO: 0.1 10E3/UL (ref 0–0.7)
EOSINOPHIL NFR BLD AUTO: 2 %
ERYTHROCYTE [DISTWIDTH] IN BLOOD BY AUTOMATED COUNT: 17.1 % (ref 10–15)
GFR SERPL CREATININE-BSD FRML MDRD: >90 ML/MIN/1.73M2
GLUCOSE BLD-MCNC: 94 MG/DL (ref 70–99)
HCT VFR BLD AUTO: 31.4 % (ref 40–53)
HGB BLD-MCNC: 9.9 G/DL (ref 13.3–17.7)
IMM GRANULOCYTES # BLD: 0 10E3/UL
IMM GRANULOCYTES NFR BLD: 0 %
INR PPP: 2.25 (ref 0.85–1.15)
LYMPHOCYTES # BLD AUTO: 1.2 10E3/UL (ref 0.8–5.3)
LYMPHOCYTES NFR BLD AUTO: 21 %
MCH RBC QN AUTO: 29.6 PG (ref 26.5–33)
MCHC RBC AUTO-ENTMCNC: 31.5 G/DL (ref 31.5–36.5)
MCV RBC AUTO: 94 FL (ref 78–100)
MONOCYTES # BLD AUTO: 0.5 10E3/UL (ref 0–1.3)
MONOCYTES NFR BLD AUTO: 9 %
NEUTROPHILS # BLD AUTO: 3.7 10E3/UL (ref 1.6–8.3)
NEUTROPHILS NFR BLD AUTO: 67 %
NRBC # BLD AUTO: 0 10E3/UL
NRBC BLD AUTO-RTO: 0 /100
PLATELET # BLD AUTO: 253 10E3/UL (ref 150–450)
POTASSIUM BLD-SCNC: 3.9 MMOL/L (ref 3.4–5.3)
PROT SERPL-MCNC: 7.1 G/DL (ref 6.8–8.8)
RBC # BLD AUTO: 3.34 10E6/UL (ref 4.4–5.9)
SODIUM SERPL-SCNC: 140 MMOL/L (ref 133–144)
WBC # BLD AUTO: 5.5 10E3/UL (ref 4–11)

## 2022-09-01 PROCEDURE — 97530 THERAPEUTIC ACTIVITIES: CPT | Mod: GP | Performed by: REHABILITATION PRACTITIONER

## 2022-09-01 PROCEDURE — 250N000013 HC RX MED GY IP 250 OP 250 PS 637: Performed by: NURSE PRACTITIONER

## 2022-09-01 PROCEDURE — 85025 COMPLETE CBC W/AUTO DIFF WBC: CPT

## 2022-09-01 PROCEDURE — 999N000125 HC STATISTIC PATIENT MED CONFERENCE < 30 MIN: Performed by: STUDENT IN AN ORGANIZED HEALTH CARE EDUCATION/TRAINING PROGRAM

## 2022-09-01 PROCEDURE — 99232 SBSQ HOSP IP/OBS MODERATE 35: CPT | Mod: 25 | Performed by: NURSE PRACTITIONER

## 2022-09-01 PROCEDURE — 99232 SBSQ HOSP IP/OBS MODERATE 35: CPT | Performed by: INTERNAL MEDICINE

## 2022-09-01 PROCEDURE — 250N000013 HC RX MED GY IP 250 OP 250 PS 637: Performed by: INTERNAL MEDICINE

## 2022-09-01 PROCEDURE — 250N000013 HC RX MED GY IP 250 OP 250 PS 637

## 2022-09-01 PROCEDURE — 250N000012 HC RX MED GY IP 250 OP 636 PS 637

## 2022-09-01 PROCEDURE — 99233 SBSQ HOSP IP/OBS HIGH 50: CPT | Mod: GC | Performed by: PHYSICAL MEDICINE & REHABILITATION

## 2022-09-01 PROCEDURE — 85610 PROTHROMBIN TIME: CPT

## 2022-09-01 PROCEDURE — 97110 THERAPEUTIC EXERCISES: CPT | Mod: GP | Performed by: REHABILITATION PRACTITIONER

## 2022-09-01 PROCEDURE — 97535 SELF CARE MNGMENT TRAINING: CPT | Mod: GO

## 2022-09-01 PROCEDURE — 80053 COMPREHEN METABOLIC PANEL: CPT

## 2022-09-01 PROCEDURE — 128N000003 HC R&B REHAB

## 2022-09-01 PROCEDURE — 999N000150 HC STATISTIC PT MED CONFERENCE < 30 MIN

## 2022-09-01 PROCEDURE — 97535 SELF CARE MNGMENT TRAINING: CPT | Mod: GO | Performed by: STUDENT IN AN ORGANIZED HEALTH CARE EDUCATION/TRAINING PROGRAM

## 2022-09-01 PROCEDURE — 93750 INTERROGATION VAD IN PERSON: CPT | Performed by: NURSE PRACTITIONER

## 2022-09-01 PROCEDURE — 36415 COLL VENOUS BLD VENIPUNCTURE: CPT

## 2022-09-01 RX ORDER — WARFARIN SODIUM 2.5 MG/1
TABLET ORAL
Qty: 72 TABLET | Refills: 0 | Status: SHIPPED | OUTPATIENT
Start: 2022-09-01 | End: 2022-09-05

## 2022-09-01 RX ORDER — ATORVASTATIN CALCIUM 40 MG/1
40 TABLET, FILM COATED ORAL EVERY EVENING
Qty: 30 TABLET | Refills: 0 | Status: ON HOLD | OUTPATIENT
Start: 2022-09-01 | End: 2022-10-17

## 2022-09-01 RX ORDER — HYDRALAZINE HYDROCHLORIDE 25 MG/1
50 TABLET, FILM COATED ORAL 3 TIMES DAILY
Status: DISCONTINUED | OUTPATIENT
Start: 2022-09-01 | End: 2022-09-04 | Stop reason: HOSPADM

## 2022-09-01 RX ORDER — SODIUM CHLORIDE/ALOE VERA
GEL (GRAM) NASAL AT BEDTIME
Qty: 14.1 G | Refills: 0 | Status: SHIPPED | OUTPATIENT
Start: 2022-09-01 | End: 2022-09-05

## 2022-09-01 RX ORDER — LISINOPRIL 10 MG/1
10 TABLET ORAL DAILY
Qty: 30 TABLET | Refills: 0 | Status: ON HOLD | OUTPATIENT
Start: 2022-09-02 | End: 2022-10-17

## 2022-09-01 RX ORDER — SERTRALINE HYDROCHLORIDE 100 MG/1
100 TABLET, FILM COATED ORAL DAILY
Qty: 30 TABLET | Refills: 0 | Status: ON HOLD | OUTPATIENT
Start: 2022-09-01 | End: 2022-09-29

## 2022-09-01 RX ORDER — LISINOPRIL 10 MG/1
10 TABLET ORAL DAILY
Status: DISCONTINUED | OUTPATIENT
Start: 2022-09-02 | End: 2022-09-04 | Stop reason: HOSPADM

## 2022-09-01 RX ORDER — MYCOPHENOLATE MOFETIL 500 MG/1
1500 TABLET ORAL 2 TIMES DAILY
Qty: 180 TABLET | Refills: 0 | Status: SHIPPED | OUTPATIENT
Start: 2022-09-01 | End: 2022-09-29

## 2022-09-01 RX ORDER — PHENOL 1.4 %
10 AEROSOL, SPRAY (ML) MUCOUS MEMBRANE AT BEDTIME
Qty: 30 TABLET | Refills: 0 | Status: SHIPPED | OUTPATIENT
Start: 2022-09-01 | End: 2022-09-04

## 2022-09-01 RX ORDER — QUETIAPINE FUMARATE 50 MG/1
150 TABLET, FILM COATED ORAL AT BEDTIME
Qty: 90 TABLET | Refills: 0 | Status: SHIPPED | OUTPATIENT
Start: 2022-09-01 | End: 2022-10-07

## 2022-09-01 RX ORDER — HYDRALAZINE HYDROCHLORIDE 25 MG/1
37.5 TABLET, FILM COATED ORAL 3 TIMES DAILY
Qty: 135 TABLET | Refills: 0 | Status: SHIPPED | OUTPATIENT
Start: 2022-09-01 | End: 2022-09-02

## 2022-09-01 RX ORDER — OXYCODONE HYDROCHLORIDE 5 MG/1
5 TABLET ORAL EVERY 6 HOURS PRN
Qty: 12 TABLET | Refills: 0 | Status: SHIPPED | OUTPATIENT
Start: 2022-09-01 | End: 2022-09-05

## 2022-09-01 RX ORDER — DIGOXIN 125 MCG
125 TABLET ORAL DAILY
Qty: 30 TABLET | Refills: 0 | Status: ON HOLD | OUTPATIENT
Start: 2022-09-01 | End: 2022-10-17

## 2022-09-01 RX ORDER — LISINOPRIL 5 MG/1
5 TABLET ORAL ONCE
Status: COMPLETED | OUTPATIENT
Start: 2022-09-01 | End: 2022-09-01

## 2022-09-01 RX ORDER — ACETAMINOPHEN 325 MG/1
975 TABLET ORAL EVERY 8 HOURS
Qty: 270 TABLET | Refills: 0 | Status: ON HOLD | OUTPATIENT
Start: 2022-09-01 | End: 2022-09-29

## 2022-09-01 RX ORDER — ASPIRIN 81 MG/1
81 TABLET, CHEWABLE ORAL DAILY
Qty: 30 TABLET | Refills: 0 | Status: SHIPPED | OUTPATIENT
Start: 2022-09-02 | End: 2022-09-05

## 2022-09-01 RX ORDER — QUETIAPINE FUMARATE 25 MG/1
25 TABLET, FILM COATED ORAL 2 TIMES DAILY
Qty: 60 TABLET | Refills: 0 | Status: SHIPPED | OUTPATIENT
Start: 2022-09-01 | End: 2022-10-07

## 2022-09-01 RX ORDER — PANTOPRAZOLE SODIUM 40 MG/1
40 TABLET, DELAYED RELEASE ORAL
Qty: 30 TABLET | Refills: 0 | Status: SHIPPED | OUTPATIENT
Start: 2022-09-02 | End: 2022-09-04

## 2022-09-01 RX ORDER — WARFARIN SODIUM 7.5 MG/1
7.5 TABLET ORAL
Status: COMPLETED | OUTPATIENT
Start: 2022-09-01 | End: 2022-09-01

## 2022-09-01 RX ADMIN — HYDROXYCHLOROQUINE SULFATE 200 MG: 200 TABLET, FILM COATED ORAL at 20:17

## 2022-09-01 RX ADMIN — SERTRALINE HYDROCHLORIDE 100 MG: 100 TABLET ORAL at 09:31

## 2022-09-01 RX ADMIN — SALINE NASAL SPRAY 2 SPRAY: 1.5 SOLUTION NASAL at 13:40

## 2022-09-01 RX ADMIN — ACETAMINOPHEN 975 MG: 325 TABLET, FILM COATED ORAL at 13:40

## 2022-09-01 RX ADMIN — DIGOXIN 125 MCG: 125 TABLET ORAL at 09:32

## 2022-09-01 RX ADMIN — Medication 37.5 MG: at 09:32

## 2022-09-01 RX ADMIN — SALINE NASAL SPRAY 2 SPRAY: 1.5 SOLUTION NASAL at 09:31

## 2022-09-01 RX ADMIN — MULTIPLE VITAMINS W/ MINERALS TAB 1 TABLET: TAB at 09:32

## 2022-09-01 RX ADMIN — ACETAMINOPHEN 975 MG: 325 TABLET, FILM COATED ORAL at 05:27

## 2022-09-01 RX ADMIN — WARFARIN SODIUM 7.5 MG: 7.5 TABLET ORAL at 18:20

## 2022-09-01 RX ADMIN — SALINE NASAL SPRAY 2 SPRAY: 1.5 SOLUTION NASAL at 18:34

## 2022-09-01 RX ADMIN — PANTOPRAZOLE SODIUM 40 MG: 40 TABLET, DELAYED RELEASE ORAL at 05:27

## 2022-09-01 RX ADMIN — HYDROXYCHLOROQUINE SULFATE 200 MG: 200 TABLET, FILM COATED ORAL at 09:34

## 2022-09-01 RX ADMIN — HYDRALAZINE HYDROCHLORIDE 50 MG: 25 TABLET ORAL at 20:17

## 2022-09-01 RX ADMIN — MYCOPHENOLATE MOFETIL 1500 MG: 500 TABLET, FILM COATED ORAL at 09:34

## 2022-09-01 RX ADMIN — ASPIRIN 81 MG: 81 TABLET, CHEWABLE ORAL at 09:32

## 2022-09-01 RX ADMIN — FLUTICASONE FUROATE AND VILANTEROL TRIFENATATE 1 PUFF: 100; 25 POWDER RESPIRATORY (INHALATION) at 09:30

## 2022-09-01 RX ADMIN — Medication 10 MG: at 21:04

## 2022-09-01 RX ADMIN — SALINE NASAL SPRAY 2 SPRAY: 1.5 SOLUTION NASAL at 21:06

## 2022-09-01 RX ADMIN — LISINOPRIL 5 MG: 5 TABLET ORAL at 09:31

## 2022-09-01 RX ADMIN — LISINOPRIL 5 MG: 5 TABLET ORAL at 13:40

## 2022-09-01 RX ADMIN — QUETIAPINE FUMARATE 25 MG: 25 TABLET ORAL at 15:52

## 2022-09-01 RX ADMIN — ATORVASTATIN CALCIUM 40 MG: 40 TABLET, FILM COATED ORAL at 20:17

## 2022-09-01 RX ADMIN — QUETIAPINE FUMARATE 25 MG: 25 TABLET ORAL at 09:32

## 2022-09-01 RX ADMIN — QUETIAPINE FUMARATE 150 MG: 50 TABLET ORAL at 21:04

## 2022-09-01 RX ADMIN — Medication 37.5 MG: at 13:40

## 2022-09-01 RX ADMIN — OXYCODONE HYDROCHLORIDE 5 MG: 5 TABLET ORAL at 21:06

## 2022-09-01 RX ADMIN — ACETAMINOPHEN 975 MG: 325 TABLET, FILM COATED ORAL at 21:04

## 2022-09-01 RX ADMIN — MYCOPHENOLATE MOFETIL 1500 MG: 500 TABLET, FILM COATED ORAL at 20:17

## 2022-09-01 ASSESSMENT — ACTIVITIES OF DAILY LIVING (ADL)
ADLS_ACUITY_SCORE: 28

## 2022-09-01 NOTE — PROGRESS NOTES
[facility]    Medicine Progress Note - Hospitalist Service    Date of Admission:  8/21/2022    Assessment & Plan                      9/1  No change in medications.  HF service to see today     Dandy Sands is a 62 yo male with medical history of SLE, antiphospholipid antibody syndrome, PE on anticoagulation with warfarin, HFrEF due to ICM and HDL.  He initially presented to [facility] in [place] on 6/13/22 with nausea, vomiting and abd bloating.  Transferred to [facility] on 6/17/22 w/ decompensated heart failure, cardiogenic shock and multiorgan failure.  He required IABP and then underwent HM3 LVAD placement on 7/8/22 by Dr. Zamora.  Surgery was complicated with RV failure requiring 29F Protek duo via RIJ RVAD placement ( removed 7/21/22), hemopericardium requiring repeat washout, chest was closed 7/13/22.  Other post LVAD placement complication included epistaxis requiring intubation 8/7/22 for airway protection.  Pt removed his own nasal packing.  He was eventually extubated.  He was transferred to acute inpatient rehab 8/21/2022 for further rehab and cares       HFrEF 2/2 ICM s/p HM3 LVAD in setting of cardiogenic shock   RV failure s/p Protek duo temporary RVAD, s/p decannulation 7/21  RV thrombus  Post chest closure hemopericardium, s/p repeat washout (7/12)  H/o CAD s/p PCI to LAD (2005), S/p CRT-D (2006), NTEMI 2007  ---   Coronary angio 5/2022 showed severe ostial LAD disease with in-stent restenosis, mid LAD dz involved diagonal bifurcation, mild LCx dz and severe prox RCA disease   ---   Evaluated for heart transplant but due to elevated DSAs, decision was made to proceed with LVAD likely as destination therapy  ---   CABG was considered but decision was made for medical therapy in light of his low EF and cardiac MRI viability study showed nonviable myocardium in LAD territory    ---   TAVARES 8/20 reveled EF 10-15%  ---   Continued  "to have intermittent low flow alarm but previous w/u were negative   ---   Encouraged pt to increase his oral intake  ---   Continue hydralazine 37.5 tid and lisinopril 5 mg daily for afterload reduction  ---   Continue ASA, digoxin and atorvastatin  ---   Pt to wear abd binder and compression stocking  ---   Goal MAP 65-85  ---   On coumadin for RV thrombus w/ INR goal 2-3.    ---   Needs EP evaluation for increasing capture threshold of RV lead        Acute hypoxic respiratory failure ; resolved  ---   Intubated 8/7 for airway protection due to severe epistaxis  ---   Extubated 8/8 w/o any difficulties  ---   Has underlying mild-moderate Emphysema  ---   Seen by pulm consult service at the [place]  ---   Continue Breao Ellipta, albuterol inhaler as needed, PFT as able     ---   Iatrogenic apical PTX, resolved    ---   Respiratory failure resolved     PE  H/o antiphospholipid antibody syndrome  ---   Continue Coumadin, pharmacy is dosing       Epistaxis  ---   Due to Coumadin + ASA  ---   Has a history of nasal perforation and underwent elective procedure in [place]  ---   He required intubation for airway protection   ---   Seen by ENT consult service and underwent catheterization   ---   Patient removed his own nasal packing  ---   Extubated w/o any difficulties  ---   Epistaxis resolved  ---   Per ENT \" If bleeding recurs, spray Afrin (3 sprays) and hold firm digital pressure over the soft part of the nose for 20 minutes continuously.  Nasal clips are ineffective and should not be used in place of digital pressure.  If bleeding continues despite these measures, repeat. If bleeding continues or is severe please contact ENT\"     E.faecalis bacteremia  ---   Blood culture from PICC line (8/2/22), 1 out 2 bottles grew staph epi (probably contaminant) and the other bottle grew E. Faecalis.    ---   NGTD from subsequent blood cultures   ---   He received Cefepime and Vancomycin, 8/7 - 8/12/22  ---   Treated for " oral thrush for 14 days   ---   Has a h/o COVID-19 infection 1/2022 ( needed intubation )        Hematemesis hx  Black tarry stool hx  BRBPR 8/31 ? Hemorrhoids hx   ---   He was seen by GI at [facility], felt melena was due to epistaxis and not GI bleed   ---   Congestive hepatopathy, shocked liver on presentation,  ---   Dysphasia post intubation, resolved   ---   On regular diet   ---   Continue PPI      Extremity and facial numbness 8/22/22  ---   Had no focal neurologic deficit  ---   CT head without contrast was negative for acute intracranial pathology  ---   Resolved     Delirium   Depression  Insomnia  ---   CT head negative for acute intracranial pathology 8/7/22   Continue cares     Acute blood loss anemia  superimposed on BRENDA  ---   Hgb stable 9.9  ---   Not on Fe supplement     SLE  ---   Continue PTA hydroxychloroquine and cellcept 1500 mg po bid      Severe Malnutrition  ---   Based on nutrition assessment   ---   Nutritional supplement being provided                   Diet: Room Service  Combination Diet Regular Diet; Thin Liquids (level 0)  Snacks/Supplements Adult: Ensure Enlive; With Meals    DVT Prophylaxis: Warfarin  Fitzgerald Catheter: Not present  Central Lines: None  Cardiac Monitoring: None  Code Status: Full Code      Disposition Plan     Expected Discharge Date: 09/03/2022      Destination: home          The patient's care was discussed with the Bedside Nurse, Patient and Primary team.    Humaira Bey MD  Hospitalist Service  [facility]  Securely message with the Vocera Web Console (learn more here)  Text page via [employer] Paging/Directory         Clinically Significant Risk Factors Present on Admission                      ______________________________________________________________________    Interval History   Scant blood per recctum yesterday with BM , according to patient  Not witnessed by staff  None since  Mild dizzy feeling when he first  stands or sits but that quickly resolved   No chest pressure  No sob   Data reviewed today: I reviewed all medications, new labs and imaging results over the last 24 hours.    Physical Exam    MAP 92  Vital Signs: Temp: (!) 95.6  F (35.3  C) Temp src: Oral               Weight: 129 lbs 0 oz   Heartmate 3 LEFT VS  Flow (Lpm): 3.3 Lpm  Pulse Index (PI): 6.6 PI  Speed (rpm): 5200 rpm  Power (persaud): 3.6 persaud    Constitutional: awake, alert, cooperative, no apparent distress, and appears stated age  Eyes: Lids and lashes normal, pupils equal, round and reactive to light, extra ocular muscles intact, sclera clear, conjunctiva normal  Hematologic / Lymphatic: no cervical lymphadenopathy  Respiratory: No increased work of breathing, good air exchange, clear to auscultation bilaterally, no crackles or wheezing  Cardiovascular: LVAD  GI: No scars, normal bowel sounds, soft, non-distended, non-tender, no masses palpated, no hepatosplenomegally  Neurologic: Awake, alert, oriented to name, place and time.  Cranial nerves II-XII are grossly intact.  Motor is 5 out of 5 bilaterally. normal.  Neuropsychiatric: General: normal, calm and normal eye contact    Data   Recent Labs   Lab 09/01/22  0606 08/31/22  0637 08/30/22  0554 08/29/22  0804 08/28/22  0747 08/27/22  0816   WBC 5.5  --   --  6.8  --  5.6   HGB 9.9*  --   --  10.9*  --  10.5*   MCV 94  --   --  93  --  92     --   --  304  --  289   INR 2.25* 2.53* 2.72* 2.32*   < > 2.19*     --   --  140  --  139   POTASSIUM 3.9  --   --  3.9  --  3.8   CHLORIDE 112*  --   --  108  --  111*   CO2 22  --   --  24  --  23   BUN 13  --   --  14  --  11   CR 0.56*  --   --  0.69  --  0.59*   ANIONGAP 6  --   --  8  --  5   ELDA 9.4  --   --  9.6  --  9.2   GLC 94  --   --  104*  --  101*   ALBUMIN 3.1*  --   --  3.2*  --   --    PROTTOTAL 7.1  --   --  8.1  --   --    BILITOTAL 0.4  --   --  0.5  --   --    ALKPHOS 131  --   --  156*  --   --    ALT 22  --   --  23  --   --     AST 21  --   --  24  --   --     < > = values in this interval not displayed.

## 2022-09-01 NOTE — PROGRESS NOTES
[facility]   Acute Rehabilitation Unit  Daily progress note    INTERVAL HISTORY  Dandy Sands was seen and examined at bedside. Says he had a better night and slept well.  He is doing well. Discussed with him about need for participating in ongoing therapies for strength and fatigue. We also discussed with him the need to keep all his up coming appointments following his planned upcoming discharge. He expressed understanding.  Denies CP, SOB, Abdominal pain or LUTS.  LBM 8/31 had not had more hematochezia, but has not had BM at time of interview. He endorses having a history of hemorrhoids.     Functionally,   See today's Team rounds Note.    Today's Updates:  - Lisinopril increased to 10 mg daily by HF team.  - Assist with setting up with     MEDICATIONS    acetaminophen  975 mg Oral Q8H     aspirin  81 mg Oral Daily     atorvastatin  40 mg Oral QPM     digoxin  125 mcg Oral Daily     fluticasone-vilanterol  1 puff Inhalation Daily     hydrALAZINE  37.5 mg Oral TID     hydroxychloroquine  200 mg Oral BID     [START ON 9/2/2022] lisinopril  10 mg Oral Daily     lisinopril  5 mg Oral Once     melatonin  10 mg Oral At Bedtime     multivitamin w/minerals  1 tablet Oral Daily     mycophenolate  1,500 mg Oral BID     pantoprazole  40 mg Oral QAM AC     QUEtiapine  150 mg Oral or Feeding Tube At Bedtime     QUEtiapine  25 mg Oral BID     sertraline  100 mg Oral Daily     sodium chloride  2 spray Both Nostrils Q4H While awake     sodium chloride (PF)  5 mL Intracatheter Q8H     warfarin ANTICOAGULANT  7.5 mg Oral ONCE at 18:00     Warfarin Therapy Reminder  1 each Oral See Admin Instructions        albuterol, hydrocortisone, LORazepam, naloxone **OR** naloxone **OR** naloxone **OR** naloxone, ondansetron, oxyCODONE, polyethylene glycol, QUEtiapine, senna-docusate     PHYSICAL EXAM  BP (!) 79/65 (BP Location: Left arm)   Pulse 58   Temp (!) 95.7  F (35.4  C) (Oral)   Resp 16   Ht  "1.702 m (5' 7\")   Wt 58.5 kg (129 lb)   SpO2 94%   BMI 20.20 kg/m    General: Seated up in bed not in acute distress  HEENT: EOMI, NC/NT, Moist mucous membranes  Pulmonary:  Bilateral clear airways on auscultation bilaterally  Cardiovascular: LVAD humming noted.   Abdominal: Soft, Nt/ND, + BS, Dry clean abdominal bandages.   Extremities: warm, well perfused, no edema in bilateral lower extremities, no tenderness in calves power 5/5 in BUE and BLE.  Neuro/MSK: Alert and oriented, face symmetric.     LABS  Recent Results (from the past 24 hour(s))   UA reflex to Microscopic    Collection Time: 08/31/22  3:42 PM   Result Value Ref Range    Color Urine Yellow Colorless, Straw, Light Yellow, Yellow    Appearance Urine Clear Clear    Glucose Urine Negative Negative mg/dL    Bilirubin Urine Negative Negative    Ketones Urine Negative Negative mg/dL    Specific Gravity Urine 1.014 1.003 - 1.035    Blood Urine Negative Negative    pH Urine 5.5 5.0 - 7.0    Protein Albumin Urine 30  (A) Negative mg/dL    Urobilinogen Urine Normal Normal, 2.0 mg/dL    Nitrite Urine Negative Negative    Leukocyte Esterase Urine Negative Negative    Bacteria Urine Few (A) None Seen /HPF    RBC Urine <1 <=2 /HPF    WBC Urine 4 <=5 /HPF    Mucus Urine Present (A) None Seen /LPF    Hyaline Casts Urine 3 (H) <=2 /LPF   INR    Collection Time: 09/01/22  6:06 AM   Result Value Ref Range    INR 2.25 (H) 0.85 - 1.15   Comprehensive metabolic panel    Collection Time: 09/01/22  6:06 AM   Result Value Ref Range    Sodium 140 133 - 144 mmol/L    Potassium 3.9 3.4 - 5.3 mmol/L    Chloride 112 (H) 94 - 109 mmol/L    Carbon Dioxide (CO2) 22 20 - 32 mmol/L    Anion Gap 6 3 - 14 mmol/L    Urea Nitrogen 13 7 - 30 mg/dL    Creatinine 0.56 (L) 0.66 - 1.25 mg/dL    Calcium 9.4 8.5 - 10.1 mg/dL    Glucose 94 70 - 99 mg/dL    Alkaline Phosphatase 131 40 - 150 U/L    AST 21 0 - 45 U/L    ALT 22 0 - 70 U/L    Protein Total 7.1 6.8 - 8.8 g/dL    Albumin 3.1 (L) 3.4 - " 5.0 g/dL    Bilirubin Total 0.4 0.2 - 1.3 mg/dL    GFR Estimate >90 >60 mL/min/1.73m2   CBC with platelets and differential    Collection Time: 09/01/22  6:06 AM   Result Value Ref Range    WBC Count 5.5 4.0 - 11.0 10e3/uL    RBC Count 3.34 (L) 4.40 - 5.90 10e6/uL    Hemoglobin 9.9 (L) 13.3 - 17.7 g/dL    Hematocrit 31.4 (L) 40.0 - 53.0 %    MCV 94 78 - 100 fL    MCH 29.6 26.5 - 33.0 pg    MCHC 31.5 31.5 - 36.5 g/dL    RDW 17.1 (H) 10.0 - 15.0 %    Platelet Count 253 150 - 450 10e3/uL    % Neutrophils 67 %    % Lymphocytes 21 %    % Monocytes 9 %    % Eosinophils 2 %    % Basophils 1 %    % Immature Granulocytes 0 %    NRBCs per 100 WBC 0 <1 /100    Absolute Neutrophils 3.7 1.6 - 8.3 10e3/uL    Absolute Lymphocytes 1.2 0.8 - 5.3 10e3/uL    Absolute Monocytes 0.5 0.0 - 1.3 10e3/uL    Absolute Eosinophils 0.1 0.0 - 0.7 10e3/uL    Absolute Basophils 0.0 0.0 - 0.2 10e3/uL    Absolute Immature Granulocytes 0.0 <=0.4 10e3/uL    Absolute NRBCs 0.0 10e3/uL       Rehabilitation - continue comprehensive acute inpatient rehabilitation program with multidisciplinary approach including therapies, rehab nursing, and physiatry following. See interval history for updates.      ASSESSMENT AND PLAN  Dandy Sands is a 61 year old right hand dominant male with past medical history of SLE, antiphospholipid syndrome, history pulmonary embolism (on anticoagulation with warfarin), HFrEF due to IMC, HDL. He initially presented to [facility] on 6/13 due to nausea, vomiting, abdominal bloating and transferred to [facility] for admission on 6/17/2022 for decompensated heart failure 2/2 cardiogenic shock c/b multiorgan failure requiring IABP, is s/p HM 3 LVAD on 7/8/2022. Stay was also c/b RV, had 29F Protek duo via RIJ, RVAD removed 7/21, hemopericardium requiring repeat washout, chest was closed 7/13. Other complication epistaxis on 8/6 ENT. Patient has h/o nasal perforation that required elective procedure (in [place]) which was  postponed due to LVAD insertion.  He required intubation 8/7 for airway protection, was extubated 8/8.  Nasal pack removed 8/9.     Admission to acute inpatient rehab: Cardiac; s/p HM3 LVAD.    Impairment group code: 09        1. PT, OT 90 minutes of each on a daily basis, in addition to rehab nursing and close management of physiatrist.       2. Impairment of ADL's: Impairment in strength, endurance, and ROM resulting in functional limitations in patient's ability to perform ADLs.     3. Impairment of mobility:  Impairment in strength, endurance, and ROM resulting in functional limitations in patient's ability to perform functional mobility.     4. Impairment of cognition/language/swallow:  N/A     5. Medical Conditions     #HFrEF 2/2 ICM s/p HM3 LVAD in setting of cardiogenic shock (SCAI D)  #RV failure s/p Protek duo temporary RVAD s/p decannulation 7/21  #RV thrombus  #Post chest closure hemopericardium s/p repeat washout (7/12)  #PMH CAD s/p PCI to LAD (2005)  #S/p CRT-D (2006)  Patient with ICM s/p HM3 LVAD 7/8/22 2/2 cardiogenic shock requiring MCS with IABP as prior to HM (initially evaluated for heart transplant, however noted to have substantially elevated DSAs during course of care, so decision made to proceed with LVAD given patient was already on temporary MCS). Intraoperative course during LVAD placement was c/b RVF resulting in cardiogenic shock requiring high dose pressors necessitating temporary RVAD support. Initial post-op course c/b hemopericardium requiring repeat washout with chest left open, with good recovery extubation and decannulation on 7/21. Patient tolerating hemodynamics and end organ standpoint well. He has had intermittent low flow alarms with high PI, no power spike, and a closed aortic valve on TTE. TTE also showed underfilling LV which could explain his low flow alarms d/t suckdown event so his LVAD speed was reduced to 5200. Patient did have a low flow alarm on 8/20 @ 1145 (PI  8-9 and not hypotensive); CTA chest done which showed that the LVAD outflow tract is widely patent. Patient is euvolemic on exam, but at times has had orthostasis symptoms; patient encouraged to stay hydrated within his fluid/diet restriction guidelines (no more than 2L/day of fluid intake and no more than 3g of Na per day in dietary intake).  P:  LVAD: Appreciate   - interrogation showed 1 low flow alarm in last 24 hours as described above  - Is euvolemic since 8/21  - Diuretics: Not needed at this time; monitor volume status and weight and consider starting low dose Lasix if he gains weight or starts showing signs of hypervolemia  - Afterload reduction: MAP goal 65-80; Hydralazine 37.5 TID + Lisinopril 2.5 qday  - Statin: Atorvastatin 40mg  - BB: Holding in setting of recent cardiogenic shock and orthostatic symptoms, consider restarting outpatient   - AA: Holding in setting of recent cardiogenic shock and orthostatic symptoms, consider restarting outpatient  - SGLTi: As outpatient, has been held due to immunosuppression he is on for his SLE.  - Warfarin for INR goal 2-3. INR 2.25 today  - ASA 81 mg qday, and Digoxin     #Epistaxis, resolved  #Intubated due to Concern for airway protection (8/7)-Extubated (8/8)  #Mild-moderate emphysema  #History of COVID-19  #Iatrogenic R apical PTX  H/o nasal perforation  For elective procedure (in [place]), postponed 2/2 LVAD insertion. Epistaxis not fixed by packing overnight on 8/6 and pt continued to bleed. Intubated for airway protection. Controlled at bedside by ENT s/p cautery and packing. Extubated 8/8. Had Cefepime/Vanc empirically for broad coverage for PNA (8/7-8/12). Patient removed his own nasal packing overnight on 8/9. ENT no longer following.    - C/t Ayr nasal saline gel at bedtime + nasal saline q4H while awake 8/24  - Continue as needed inhalers, PFT as able  --- Breo Ellipta discontinued 8/25.  Patient not using.  -  followup with his ENT as OP (see  below)     #Insomnia/Delirium, resolved  Some concern that he had a fall overnight on 8/6. Discussed with nursing staff and he did NOT have a fall, although he did try to get out of bed. CT head was obtained (8/7) and it was unremarkable. Patient had some issues with delirium and insomnia during hospitalization and psychiatry was consulted who recommended  - Sertraline and Quetiapine per psych recs     # ? Orthostatic  Noted to be dizzy with therapy.  Has occasional hypotension with SBP high 80s.  May benefit from compressions.  -Compression socks and, abdominal binder 8/22     #GERD  #Congestive hepatopathy in the setting of cardiac insuffiency - Resolved  #Hematemesis / oral mucosal bleeding -resolved    #Dysphagia  GI consulted 7/31 for black tarry stools and a possible GI bleed, however it is more likely swallowed blood from epistaxis that patient previously had. GI did not recommend an upper endoscopy. Recurrence of bloody stools likely due to epistaxis which have resolved. Hemoglobin was monitored and continued to be stable throughout the rest of the hospitalization. Cleared for regular diet by SLP.  - 8/31 monitor for hematochezia. CBC in AM and monitor INR. Will do DENISSE if another episode is noted.  - 9/1 endorses hemorrhoids. No hematochezia today. Continue Hydrocortisone crea cream.     #Anemia due to blood loss from Epistaxis-Resolved  #History of DVT and PE   #Antiphospholipid antibody syndrome  #History of iron deficiency anemia  As noted above, likely related to epistaxis. After above interventions were complete and management was initiated per ENT recs, hemoglobin remained stable and no further evidence of bleeding.  - Saline (OCEAN) nasal sprays Q4H when awake.     #SLE  - PTA meds: hydroxychloroquine 200mg, resumed 7/16  - Restart PTA Cellcept at discharge     #Oral thrush, resolved  Completed 14 days of nystatin        6. Adjustment to disability:  Clinical psychology to eval and treat as  needed  7. FEN:  Regular with thins  8. Bowel: As needed MiraLAX and Senokot  9. Bladder: Pending review  10. DVT Prophylaxis: Warfarin goal 2-3 for LVAD, daily INR, and ASA 81  11. GI Prophylaxis: Pantoprazole 40 mg daily  12. Code: Full  13. Disposition: Home with homecare and Home with outpatient therapy  14. ELOS: 12 to 14 days.  15. Rehab prognosis: Fair   16. Follow up Appointments on Discharge:   - PCP follow-up 2 to 3 weeks : Requires CBC BMP  - Follow-up with [facility] at next available appointment and follow-up with your cardiologist in the next few months at next available appointment.  - Follow-up with psychiatry post discharge  - Followup with his ENT in [place] to rediscuss possible procedure for his nasal perforation vs. conservative management  - Dental follow up as OP on 9/7.        Patient seen and discussed with Dr. Mathew, PM&R Staff Physician    Padmini Linda MD   Resident Physician, PGY-2   Physical Medicine and Rehabilitation  [facility]

## 2022-09-01 NOTE — PLAN OF CARE
Goal Outcome Evaluation:    Plan of Care Reviewed With: patient     Overall Patient Progress: improving    Outcome Evaluation: Pt continues to advocate for pain management, pt is knowledgable of LVAD dressing change, failed MAP.    FOCUS/GOAL  Medication management, Pain management, and Wound care management     ASSESSMENT, INTERVENTIONS AND CONTINUING PLAN FOR GOAL:  Pt Aox4, using call light to make needs known.  Pt is REJI durring day if hooked to batteries, pt is compliant with calling when not on batteries.  Denies pain at beginning of shift, mild back pain controlled with scheduled tylenol on writers shift.  LVAD parameters met for everything except MAP which hospitalist team and PMR team are aware. PIV pulled by writer.  Pt is set to discharge with daughter tomorrow after training. Cont of B&B, LBM 8/31. Reg/thin, taking pills whole with water.  Nursing will continue with POC.

## 2022-09-01 NOTE — PHARMACY-ANTICOAGULATION SERVICE
Clinical Pharmacy- Warfarin Discharge Note  This patient is currently on warfarin for the treatment of LVAD.  INR Goal= 2-3  Expected length of therapy lifetime.           Anticoagulation Dose History     Recent Dosing and Labs Latest Ref Rng & Units 8/26/2022 8/27/2022 8/28/2022 8/29/2022 8/30/2022 8/31/2022 9/1/2022    Warfarin 3 mg - 6 mg - - 6 mg - 6 mg -    Warfarin 5 mg - - - - - 5 mg - -    Warfarin 7.5 mg - - 7.5 mg 7.5 mg - - - -    [id number] 70 - 130 % - - - - - - -    INR 0.85 - 1.15 2.26(H) 2.19(H) 2.28(H) 2.32(H) 2.72(H) 2.53(H) 2.25(H)          Vitamin K doses administered during the last 7 days: none  FFP administered during the last 7 days: none  Recommend discharging the patient on a warfarin regimen of 5 mg on Mondays, Wednesdays, Fridays and 7.5 mg all other days with a prescription for warfarin 5mg tablets.      The patient should have an INR checked 9/5/22 or 9/6/22.    Nicole Gutierrez, SaravananD, BCPS

## 2022-09-01 NOTE — PLAN OF CARE
OT post rounds note: Called pt's son Amos to give update. Reviewed recommendations of use of walker for mobility and oversight/assist with meds and other higher level IADL tasks. Son in agreement that he can assist. Son asking if the pt can discharge on Friday after his daughter is trained in as a caregiver vs Sat. Told him I would ask the team and follow up. Also informed him on HH vs OP pending insurance.     Addendum: Called Amos with update after huddle. Let him know that the pt can discharge tomorrow after his daughter completes the caregiver training. Also informed him that pt will start with home care PT and OT.

## 2022-09-01 NOTE — PLAN OF CARE
Care Coordination:     Discharge Planning.     Patient informed of home care agency at discharge. Patient requested son get updated as well. Writer spoke with Samantha via phone. Writer updated samantha that patient will receive PT/OT/RN services through [facility]. Listed in AVS. Son was agreeable to this. Son asked to be  when scheduling home care appointments. Writer will update HC liaison with this information.    Son did inquire about medical update on his fluctuating blood pressures. Writer was only able to provide a brief update that lisinopril was increased today to 10 mg per Medicines note. Encouraged family to also bring any follow up questions to discharge tomorrow to get answered.     Layne Liang   Patient Care Management Coordinator  Acute Rehabilitation Unit/ Transitional Care Unit.   Ph: [phone]

## 2022-09-01 NOTE — CONSULTS
[facility]   Cardiology II Service / Advanced Heart Failure  ARU/TCU Consult Note      Patient: Dandy Sands  MRN: [id number]  Admission Date: 8/21/2022  ARU/TCU Day # 11    Assessment and Plan: Dandy Sands is a 60 yo male with HFrEF 2/2 ICM s/p HM3 in the setting of cardiogenic shock on 7/8/22 c/b RV failure with temporary RVAD support with Protek duo (removed 7/21) and post chest closure hemopericardium s/p repeat washout on 7/12, epistaixis requiring reintubation for airway protection. Other medical history includes HTN, HLD, lupus, and antiphospholipid syndrome.    Recommendations:  - increase hydal to 50 mg tid for easier use and MAPS>90  - monitor MAPs, if remain >85 tomorrow consider increasing lisinopril further (20 mg daily)  - check LDH with next labs    # Chronic systolic heart failure secondary to ICM   # s/p HM3 LVAD on 7/8/12  # Low flow alarms  # RV failure s/p tMCS (Protek Duo)  Stage D. NYHA Class III- confounded by recent surgery. Post op course c/b RV failure with temporary RVAD support with Protek duo (removed 7/21) and post chest closure hemopericardium s/p repeat washout on 7/12, epistaixis requiring reintubation for airway protection.    -Fluid status: euvolemic off diuretics  -ACEi/ARB/ARNI/afterload reduction: lisinopril 10 mg daily (increased today) and hydralazine 37.5 mg tid  -BB: deferred give recent LVAD implant with RV failure   -Aldosterone antagonist: deferred while other medical therapy is prioritized   -SGLT2i: defered given patient immunosuppression and unclear evidence in LVAD patients  -RV suppor:t digoxin 125 mcg daily  -SCD prophylaxis: ICD  -MAP: goal MAP 65-85, has been >85, lisinopril increased this AM  -LDH trends: 263 on 8/21  -Anticoagulation: warfarin INR goal 2-3, today 2.2  -Antiplatelet: ASA 81 mg daily  -Low flow alarms: CTA in the hospital negative. ECHO in the hospital with LVIDd 4.6, AV not opening, mild AI. Likely r/t hypovolemia and  "hypertension    The patient's HeartMate LVAD was interrogated 9/1/2022  * Speed 5200 rpm   * Pulsatility index 6.6-7.8   * Power 3.8 Kellogg   * Flow 3.4 L/minute   Alarms were reviewed, and notable for PI events and one low flow alarm this AM.  The driveline exit site was inspected, c/d/i.   All external components were inspected and showed no evidence of damage or malfunction, none replaced.   No changes to VAD settings made    # Epistaxis, resolved  # Melena, resolved  During his recent admission had significant epistaxis requiring packing and ultimately required reintubation for airway protection. Had melena in this setting which was felt to be related to the epistaxis and no further GI work-up was pursued.   - nasal saline     # E.faecalis on culture from PICC line (8/2/22) - 1/2 tubes in 1/4 sets collected 8/2  # S.epidermidis (MSSE) on culture from PICC line (8/2/22) - 1/2 tubes in 1/4 sets collected 8/2  Treated with Cefepime/Vanc empirically for broad coverage, throuhg 8/12    # SLE  - PTA meds: hydroxychloroquine 200mg  - PTA Cellcept 1500 mg bid  - follow-up with rheum when outpatient    # History of DVT and PE   # Antiphospholipid antibody syndrome  - Anticoaguation as above     Naye Sharpe, INEZ, NP-C  Advanced Heart Failure/Cardiology 2 Nurse Practitioner  Pager     ================================================================    Subjective/24-Hr Events:   Last 24 hr care team notes reviewed. Feeling well. Feels ready to discharge. Denies orthopnea, PND, LE edema. MAPs are consistently >90s. No LVAD related concerns.    ROS:  4 point ROS including respiratory, CV, GI and  (other than that noted in the HPI) is negative.     Medications: Reviewed in EPIC.     Physical Exam:   BP (!) 79/65 (BP Location: Left arm)   Pulse 58   Temp (!) 96.3  F (35.7  C) (Oral)   Resp 16   Ht 1.702 m (5' 7\")   Wt 58.5 kg (129 lb)   SpO2 97%   BMI 20.20 kg/m      GENERAL: Appears comfortable, in no " distress .  HEENT: Eye symmetrical, no discharge or icterus bilaterally. Mucous membranes moist and without lesions.  NECK: Supple, JVD just above clavicle.  CV: Hum of HM3, no adventitious sounds  RESPIRATORY: Respirations regular, even, and unlabored. Lungs CTA throughout.   GI: Soft and non distended with normoactive bowel sounds present in all quadrants. No tenderness, rebound, guarding.   EXTREMITIES: No peripheral edema. No pulses. All extremities are warm and well perfused. Non pulsatile.   NEUROLOGIC: Alert and interacting appropriatly.. No focal deficits.   MUSCULOSKELETAL: No joint swelling or tenderness.   SKIN: No jaundice. No rashes or lesions.     Labs:  CMP  Recent Labs   Lab 09/01/22  0606 08/29/22  0804 08/27/22  0816    140 139   POTASSIUM 3.9 3.9 3.8   CHLORIDE 112* 108 111*   CO2 22 24 23   ANIONGAP 6 8 5   GLC 94 104* 101*   BUN 13 14 11   CR 0.56* 0.69 0.59*   GFRESTIMATED >90 >90 >90   ELDA 9.4 9.6 9.2   MAG  --   --  1.9   PROTTOTAL 7.1 8.1  --    ALBUMIN 3.1* 3.2*  --    BILITOTAL 0.4 0.5  --    ALKPHOS 131 156*  --    AST 21 24  --    ALT 22 23  --        CBC  Recent Labs   Lab 09/01/22  0606 08/29/22  0804 08/27/22  0816   WBC 5.5 6.8 5.6   RBC 3.34* 3.73* 3.60*   HGB 9.9* 10.9* 10.5*   HCT 31.4* 34.8* 33.0*   MCV 94 93 92   MCH 29.6 29.2 29.2   MCHC 31.5 31.3* 31.8   RDW 17.1* 17.0* 16.8*    304 289       INR  Recent Labs   Lab 09/01/22  0606 08/31/22  0637 08/30/22  0554 08/29/22  0804   INR 2.25* 2.53* 2.72* 2.32*       Time/Communication  I personally spent a total of 30 minutes. Of that >15 minutes was counseling/coordination of patient's care. Plan of care discussed with patient. See my note above for details.

## 2022-09-01 NOTE — PLAN OF CARE
Discharge Planner Post-Acute Rehab PT:     Discharge Plan: [facility] with son on Saturday 9/3 - HC vs OP pending insurance.      Precautions: Sternal, falls, LVAD     Current Status: MOD I in room with WW (on batteries)  Bed Mobility: IND  Transfer: MOD I   Gait: MOD I in room, SBA in halls  Stairs: CGA with rails  Balance: David on 8/26: 37/56  2MWT on 8/26: 127 ft with FWW    Assessment: MOD I in room, working on endurance and stair training. Family will be around 24-7 for support after discharge. HCPT pending insurance, OP PT setup as backup at [facility].   Pm: CHEL done this pm. Family training set for tomorrow on track for discharge   Other Barriers to Discharge (DME, Family Training, etc):   Family training to be scheduled  Anticipate FWW to be issued

## 2022-09-01 NOTE — PLAN OF CARE
FOCUS/GOAL  Bowel management, Bladder management, Pain management, Mobility, Skin integrity, and Safety management    ASSESSMENT, INTERVENTIONS AND CONTINUING PLAN FOR GOAL:  Patient is alert and oriented x 4. Lower back pain scheduled tylenol and PRN oxycodone was effective. Patient denied headache, dizziness CP or SOB. Urine sample collected for UA/UC, result in on call notified. LVAD parameter WDL MAP 94 via doppler. Drive line dressing and secure devise changed. No care concern at this time. Call light is in reach alarm is on. We will continue per POC.     .Goal Outcome Evaluation:

## 2022-09-01 NOTE — PLAN OF CARE
Goal Outcome Evaluation:    Plan of Care Reviewed With: patient     Overall Patient Progress: no change    Outcome Evaluation: no change in pt progress on shift.    Pt A&Ox4. A1 overnight, alarms in place. Cont of bladder on shift, using urinal at bedside. Pt c/o back pain, managed with scheduled tylenol. Pt denies SOB, headache, nausea or new numbness/tingling. No new skin concerns. LVAD in place, MAP 92, all other numbers within parameters. R PIV saline locked, WDL. Pt slept well overnight, call light in reach, alarms on, continue POC.

## 2022-09-01 NOTE — DISCHARGE SUMMARY
[facility]   Acute Rehabilitation Unit  Discharge summary     Date of Admission: 8/21/2022  Date of Discharge: 9/4/2022  Disposition: Home  Primary Care Physician: Scar Jeffrey  Attending physician: Abner Mathew MD  Other significant physician provider(s): Dr. Gisell Barnes        DISCHARGE DIAGNOSIS  - Cardiac; s/p HM3 LVAD. Impairment group code: 09  - Epistaxis, resolved  - Insomnia/Delirium, resolved  - Orthostatic Hypotension: Resolved  - GERD  - Congestive hepatopathy in the setting of cardiac insuffiency - Resolved  - Anemia due to blood loss from Epistaxis-Resolved  - SLE  - Oral thrush, resolved  - Hematochezia: Hemorrhoids vs AVM    BRIEF SUMMARY  Dandy Sands is a 61 year old right hand dominant male with past medical history of SLE, antiphospholipid syndrome, history pulmonary embolism (on anticoagulation with warfarin), HFrEF due to IMC, HDL. He initially presented to [facility] on 6/13 due to nausea, vomiting, abdominal bloating and transferred to [facility] for admission on 6/17/2022 for decompensated heart failure 2/2 cardiogenic shock c/b multiorgan failure requiring IABP, is s/p HM 3 LVAD on 7/8/2022. Stay was also c/b RV, had 29F Protek duo via RIJ, RVAD removed 7/21, hemopericardium requiring repeat washout, chest was closed 7/13. Other complication epistaxis on 8/6 ENT. Patient has h/o nasal perforation that required elective procedure (in [place]) which was postponed due to LVAD insertion.  He required intubation 8/7 for airway protection, was extubated 8/8.  Nasal pack removed 8/9. ARU stay c/b hematochezia unclear if Hemorrhoids vs AVM.      REHABILITATION COURSE  PT:   Bed Mobility: IND  Transfer: MOD I   Gait: MOD I in room, SBA in halls  Stairs: CGA with rails  Balance: David on 8/26: 37/56  2MWT on 8/26: 127 ft with FWW  Assessment: MOD I in room, working on endurance and stair training. Family will be around 24-7 for support  after discharge. HCPT pending insurance, OP PT setup as backup at [facility].   Pm: GG done this pm. Family training set for tomorrow on track for discharge     OT:   Therapy recommendation(s):    Continued therapy is recommended.  Rationale/Recommendations:  Pt is mod I with ADL routine and recommended to use the walker for mobility. Pt completed the ACE III version US copy A cognitive screen on 8/30 Version with a score of  65/100. A score of 82 or less indicates suspected cognitive impairment.  Pt scored 11/26 on memory portion of assessment. Worked on functional cognition here and pt required oversight and min A with various IADL including med set up and finances. Recommend assist with med management and higher level IADL as well as follow up with OT to continue progressing IND. Son is aware of the assist needed. Pt missed therapy at times but should do better when he can operate on his own schedule. Recommend HH OT .    MEDICAL COURSE  Dandy made gains during his ARU stay. He had limitations with orthostatics initially but was able to correct with abdominal binder and compression socks. His stay was also complicated by low flow on LVAD and high MAPs requiring on going management with the IM and Cardiology team. He was asymptomatic with these. He had no epistaxis, delirium, and no flare from SLE during ARU stay. During the last days of his rehab stay, he was noted to have x 1 episode of hematochezia 8/31, which was managed with hydrocortisone rectal cream and PREP-H . This resolved. On date of initial discharge 9/2 he was noted to have another massive hematochezia during toileting with BM. His hemorrhoids vs possible AVM was suspected as casual per GI consult. He required 48 hr monitoring with notable stable Hgb at 11.1 and INR which remained within goal 2-3 at 2.69 9/4. He discharges today with stable VS, without ongoing bleeding post x 2 BM in x 2 days.  LBM 9/4 formed soft brown stools.    Follow up  Plans:  - PCP follow-up 2 to 3 weeks : Requires CBC BMP.  - Follow-up with [facility] clinic at next available appointment and follow-up with your cardiologist in the next few months at next available appointment.  - Follow-up with psychiatry post discharge  - Followup with his ENT in [place] to rediscuss possible procedure for his nasal perforation vs. conservative management  - Dental follow up as OP on 9/7  - GI follow up.     DISCHARGE MEDICATIONS  Discharge Medication List as of 9/4/2022 10:28 AM      START taking these medications    Details   fluticasone-vilanterol (BREO ELLIPTA) 100-25 MCG/INH inhaler Inhale 1 puff into the lungs daily for 30 days, Disp-30 each, R-0, E-Prescribe      hydrocortisone 1 % CREA cream Place rectally 3 times daily for 2 daysDisp-28 g, [id number]      !! melatonin 10 MG TABS tablet Take 1 tablet (10 mg) by mouth At Bedtime for 30 days, Disp-30 tablet, R-0, E-Prescribe      oxyCODONE (ROXICODONE) 5 MG tablet Take 1 tablet (5 mg) by mouth every 6 hours as needed for moderate to severe pain, Disp-12 tablet, R-0, E-Prescribe      pramox-pe-glycerin-petrolatum (PREPARATION H) 1-0.25-14.4-15 % CREA cream Place rectally 2 times daily as needed for hemorrhoids Apply immediately after a bowel movement.Disp-51 g, [id number]       !! - Potential duplicate medications found. Please discuss with provider.      CONTINUE these medications which have CHANGED    Details   acetaminophen (TYLENOL) 325 MG tablet Take 3 tablets (975 mg) by mouth every 8 hours for 30 days, Disp-270 tablet, R-0, E-Prescribe      aspirin (ASA) 81 MG chewable tablet Take 1 tablet (81 mg) by mouth daily for 30 days, Disp-30 tablet, R-0, E-Prescribe      atorvastatin (LIPITOR) 40 MG tablet Take 1 tablet (40 mg) by mouth every evening for 30 days, Disp-30 tablet, R-0, E-Prescribe      digoxin (LANOXIN) 125 MCG tablet Take 1 tablet (125 mcg) by mouth daily for 30 days, Disp-30 tablet, R-0, E-Prescribe      hydrALAZINE  (APRESOLINE) 50 MG tablet Take 1 tablet (50 mg) by mouth 3 times daily for 30 days, Disp-90 tablet, R-0, E-Prescribe      lisinopril (ZESTRIL) 10 MG tablet Take 1 tablet (10 mg) by mouth daily for 30 days, Disp-30 tablet, R-0, E-Prescribe      mycophenolate (GENERIC EQUIVALENT) 500 MG tablet Take 3 tablets (1,500 mg) by mouth 2 times daily for 30 days, Disp-180 tablet, R-0, E-Prescribe      pantoprazole (PROTONIX) 40 MG EC tablet Take 1 tablet (40 mg) by mouth daily, Disp-30 tablet, R-0, E-Prescribe      !! QUEtiapine (SEROQUEL) 25 MG tablet Take 1 tablet (25 mg) by mouth 2 times daily for 30 days, Disp-60 tablet, R-0, E-Prescribe      !! QUEtiapine (SEROQUEL) 50 MG tablet Take 3 tablets (150 mg) by mouth At Bedtime for 30 days, Disp-90 tablet, R-0, E-Prescribe      saline nasal (AYR SALINE) GEL topical gel Apply into each nare At BedtimeDisp-14.1 g, [id number]      sertraline (ZOLOFT) 100 MG tablet Take 1 tablet (100 mg) by mouth daily for 30 days, Disp-30 tablet, R-0, E-Prescribe      sodium chloride (OCEAN) 0.65 % nasal spray Spray 2 sprays into both nostrils every 4 hours (while awake), Disp-60 mL, R-0, E-Prescribe      warfarin ANTICOAGULANT (COUMADIN) 2.5 MG tablet Take 2 tabs (5mg) Monday, Wednesday, Friday. Take 3 tabs (7.5mg) Tuesday, Thursday, Saturday, Sunday. Future dose adjustments pending INR, Disp-72 tablet, R-0, SARAH, E-Prescribe       !! - Potential duplicate medications found. Please discuss with provider.      CONTINUE these medications which have NOT CHANGED    Details   ferrous sulfate (FE TABS) 325 (65 Fe) MG EC tablet Take 325 mg by mouth daily (with lunch), Historical      hydroxychloroquine (PLAQUENIL) 200 MG tablet Take 200 mg by mouth 2 times daily, Historical      hydrOXYzine (ATARAX) 25 MG tablet Take 1 tablet (25 mg) by mouth every 6 hours as needed for anxiety (ANXIETY), Disp-60 tablet, R-3, E-Prescribe      !! melatonin 3 MG tablet Take 6 mg by mouth nightly as needed for sleep,  Historical      multivitamin w/minerals (THERA-VIT-M) tablet Take 1 tablet by mouth daily, R-0, No Print Out      promethazine (PHENERGAN) 12.5 MG tablet Take 1 tablet (12.5 mg) by mouth every 6 hours as needed for nausea or vomiting, Disp-30 tablet, R-1, E-Prescribe      tamsulosin (FLOMAX) 0.4 MG capsule Take 0.4 mg by mouth daily, Historical      thiamine (B-1) 100 MG tablet Take 1 tablet (100 mg) by mouth daily, Disp-30 tablet, R-0, E-Prescribe      zolpidem (AMBIEN) 5 MG tablet Take 5 mg by mouth nightly as needed for sleep, Historical       !! - Potential duplicate medications found. Please discuss with provider.      STOP taking these medications       Lidocaine (LIDOCARE) 4 % Patch Comments:   Reason for Stopping:         LORazepam (ATIVAN) 0.5 MG tablet Comments:   Reason for Stopping:                 DISCHARGE INSTRUCTIONS AND FOLLOW UP  Discharge Procedure Orders   MEDICAL SUPPLIES AND EQUIPME   Order Comments: Patient needs a BP machine for home, discharging today.     Physical Therapy Referral   Standing Status: Future   Referral Priority: Routine: Next available opening Referral Type: Rehab Therapy Physical Therapy   Number of Visits Requested: 1     Occupational Therapy Referral   Standing Status: Future   Referral Priority: Routine: Next available opening Referral Type: Occupational Therapy   Number of Visits Requested: 1     Home Care Referral   Referral Priority: Routine: Next available opening Referral Type: Home Health Therapies & Aides   Number of Visits Requested: 1     Reason for your hospital stay   Order Comments: Admitted due to Cardiac; s/p HM3 LVAD     Activity   Order Comments: Your activity upon discharge: activity as tolerated and no driving until cleared and ambulate in house     Order Specific Question Answer Comments   Is discharge order? Yes      Adult [facility] Follow-up and recommended labs and tests   Order Comments: 1. Follow up with primary care provider, Scar Jeffrey, within  7 days to evaluate medication change, to evaluate after surgery, and for hospital follow- up.  The following labs/tests are recommended: Requires CBC BMP INR (Monday or Tuesday)  2. Follow-up with [facility] clinic at next available appointment and follow-up with your cardiologist in the next few months at next available appointment   3. Follow-up with psychiatry post discharge  4. Followup with his ENT in [place] to rediscuss possible procedure for his nasal perforation vs. conservative management  5. Dental follow up as OP.    Appointments on [place] and/or [facility] (with [facility] or [facility] provider or service). Call [phone] if you haven't heard regarding these appointments within 7 days of discharge.     Full Code     Order Specific Question Answer Comments   Code status determined by: Discussion with patient/ legal decision maker      Diet   Order Comments: Follow this diet upon discharge: Orders Placed This Encounter      Room Service      Snacks/Supplements Adult: Ensure Enlive; With Meals      Combination Diet Regular Diet; Thin Liquids (level 0)     Order Specific Question Answer Comments   Is discharge order? Yes           PHYSICAL EXAMINATION    Most recent Vital Signs:   Vitals:    09/03/22 1315 09/03/22 1902 09/04/22 0540 09/04/22 0901   BP:       BP Location: Left arm Left arm  Left arm   Patient Position: Semi-Alvarez's Sitting  Chair   Cuff Size:    Adult Large   Pulse:  76  86   Resp:  16  16   Temp:    (!) 96.1  F (35.6  C)   TempSrc:  Oral  Oral   SpO2:  100%  96%   Weight:   59.1 kg (130 lb 6.4 oz)    Height:         Hemoglobin   Date Value Ref Range Status   09/04/2022 11.1 (L) 13.3 - 17.7 g/dL Final   ]  INR   Date Value Ref Range Status   09/04/2022 2.69 (H) 0.85 - 1.15 Final     Comment:     Some International Normalized Ratio (INR) results performed at the [facility] for patients 6 months and older reported between 07/11/2021 and 5/17/2022 were evaluated against an  outdated reference interval of 0.86-1.14 rather than the intended reference interval of 0.85-1.15. The INR value itself was accurate, but may not have been flagged correctly due to the outdated reference interval.        Gen: Seated up dressed on edge of the bed not in acute distress.   HEENT: EOMI, Moist mucous membranes, no oral thrush, no epistaxis  Cardio: LVAD hum is heard.  Pulm: clear bilateral airways on ascultation.  Abd: Soft, NT/ND, LVAD lines in place and bandages are dry and clean. Present bowel sounds.  Per RN:   Extr: Power 5/5 in BUE and BLE. No edema or tenderness in the calves. Sensation is Intact.  Neuro/MSK: Alert and Oriented x 4, facial symmetry.      Patient was evaluated on day of discharge by attending physician, Gisell Barnes MD, who agrees with plan of care.    Discharge summary was forwarded to Scar Jeffrey (PCP) at the time of discharge, so as to bridge from hospital to outpatient care.     It was our pleasure to care for aDndy Sands during this hospitalization. Please do not hesitate to contact me should there be questions regarding the hospital course or discharge plan.      Padmini Linda MD  Resident Physician PGY-2  PM&R Department  [facility]      Physician Attestation   I saw and evaluated this patient prior to discharge.  I discussed the patient with the resident/fellow and agree with plan of care as documented in the note.      I personally reviewed vital signs, medications, labs and notes in the chart.    He was feeling well this am; reviewed the plan for discharge with hospitalist team. Has had 2 BMs with no bleeding. INR and Hgb stable. Will resume hydrocortisone cream for 2 more days and anti-hemorrhoids cream for after BMs. Close f/u with LVAD team.     I personally spent 35 minutes on discharge activities.    Gisell Barnes MD  Date of Service (when I saw the patient): 09/04/22

## 2022-09-01 NOTE — PLAN OF CARE
Discharge Planner Post-Acute Rehab OT:     Discharge Plan: to [facility] with son     Precautions: fall, strernal precautions, LVAD, lucero hose and abdominal binder to be worn due to low BP    Current Status:  ADLs:    Mobility: SBA with walker for short distance    Grooming: setup, close SBA standing    Dressing: setup for UB dressing, set up for LB dressing    Bathing: no showers, UB sponge bath set up assist and close SBA standing, pt declined LB washing    Toileting: SBA for transfer and toileting  IADLs: son can assist  Vision/Cognition: wears glasses, cognition assessment: ACE III score on 8/30 Version US copy A is 65/100. A score of 82 or less indicates suspected cognitive impairment.  Pt scored 11/26 on memory portion of assessment.    Assessment: ot pt able to carry over asignment of bill task from am and complete corretly pt able to give change back 75% with increase accuracy with technique to round up to even number than subtract that number from $1.00.    Other Barriers to Discharge (DME, Family Training, etc): dizziness

## 2022-09-02 ENCOUNTER — APPOINTMENT (OUTPATIENT)
Dept: OCCUPATIONAL THERAPY | Facility: CLINIC | Age: 62
DRG: 949 | End: 2022-09-02
Attending: PHYSICAL MEDICINE & REHABILITATION
Payer: COMMERCIAL

## 2022-09-02 LAB
BASOPHILS # BLD AUTO: 0 10E3/UL (ref 0–0.2)
BASOPHILS NFR BLD AUTO: 1 %
EOSINOPHIL # BLD AUTO: 0.1 10E3/UL (ref 0–0.7)
EOSINOPHIL NFR BLD AUTO: 2 %
ERYTHROCYTE [DISTWIDTH] IN BLOOD BY AUTOMATED COUNT: 17 % (ref 10–15)
HCT VFR BLD AUTO: 35 % (ref 40–53)
HGB BLD-MCNC: 11 G/DL (ref 13.3–17.7)
HGB BLD-MCNC: 11 G/DL (ref 13.3–17.7)
HOLD SPECIMEN: NORMAL
HOLD SPECIMEN: NORMAL
IMM GRANULOCYTES # BLD: 0 10E3/UL
IMM GRANULOCYTES NFR BLD: 0 %
INR PPP: 2.56 (ref 0.85–1.15)
LYMPHOCYTES # BLD AUTO: 1.5 10E3/UL (ref 0.8–5.3)
LYMPHOCYTES NFR BLD AUTO: 20 %
MCH RBC QN AUTO: 29.4 PG (ref 26.5–33)
MCHC RBC AUTO-ENTMCNC: 31.4 G/DL (ref 31.5–36.5)
MCV RBC AUTO: 94 FL (ref 78–100)
MONOCYTES # BLD AUTO: 0.6 10E3/UL (ref 0–1.3)
MONOCYTES NFR BLD AUTO: 8 %
NEUTROPHILS # BLD AUTO: 5.2 10E3/UL (ref 1.6–8.3)
NEUTROPHILS NFR BLD AUTO: 69 %
NRBC # BLD AUTO: 0 10E3/UL
NRBC BLD AUTO-RTO: 0 /100
PLATELET # BLD AUTO: 312 10E3/UL (ref 150–450)
RBC # BLD AUTO: 3.74 10E6/UL (ref 4.4–5.9)
WBC # BLD AUTO: 7.5 10E3/UL (ref 4–11)

## 2022-09-02 PROCEDURE — 250N000013 HC RX MED GY IP 250 OP 250 PS 637: Performed by: NURSE PRACTITIONER

## 2022-09-02 PROCEDURE — 85004 AUTOMATED DIFF WBC COUNT: CPT

## 2022-09-02 PROCEDURE — 250N000013 HC RX MED GY IP 250 OP 250 PS 637: Performed by: PHYSICAL MEDICINE & REHABILITATION

## 2022-09-02 PROCEDURE — 85018 HEMOGLOBIN: CPT

## 2022-09-02 PROCEDURE — 250N000013 HC RX MED GY IP 250 OP 250 PS 637

## 2022-09-02 PROCEDURE — 85610 PROTHROMBIN TIME: CPT

## 2022-09-02 PROCEDURE — 128N000003 HC R&B REHAB

## 2022-09-02 PROCEDURE — 99232 SBSQ HOSP IP/OBS MODERATE 35: CPT | Performed by: INTERNAL MEDICINE

## 2022-09-02 PROCEDURE — 97535 SELF CARE MNGMENT TRAINING: CPT | Mod: GO | Performed by: STUDENT IN AN ORGANIZED HEALTH CARE EDUCATION/TRAINING PROGRAM

## 2022-09-02 PROCEDURE — 36415 COLL VENOUS BLD VENIPUNCTURE: CPT

## 2022-09-02 PROCEDURE — 99233 SBSQ HOSP IP/OBS HIGH 50: CPT | Mod: GC | Performed by: PHYSICAL MEDICINE & REHABILITATION

## 2022-09-02 PROCEDURE — 250N000012 HC RX MED GY IP 250 OP 636 PS 637

## 2022-09-02 PROCEDURE — 250N000013 HC RX MED GY IP 250 OP 250 PS 637: Performed by: INTERNAL MEDICINE

## 2022-09-02 RX ORDER — WARFARIN SODIUM 5 MG/1
5 TABLET ORAL
Status: COMPLETED | OUTPATIENT
Start: 2022-09-02 | End: 2022-09-02

## 2022-09-02 RX ORDER — PANTOPRAZOLE SODIUM 40 MG/1
40 TABLET, DELAYED RELEASE ORAL
Status: DISCONTINUED | OUTPATIENT
Start: 2022-09-02 | End: 2022-09-04 | Stop reason: HOSPADM

## 2022-09-02 RX ORDER — POLYETHYLENE GLYCOL 3350 17 G/17G
17 POWDER, FOR SOLUTION ORAL DAILY
Status: DISCONTINUED | OUTPATIENT
Start: 2022-09-02 | End: 2022-09-04 | Stop reason: HOSPADM

## 2022-09-02 RX ORDER — AMOXICILLIN 250 MG
1-4 CAPSULE ORAL 2 TIMES DAILY
Status: DISCONTINUED | OUTPATIENT
Start: 2022-09-02 | End: 2022-09-04 | Stop reason: HOSPADM

## 2022-09-02 RX ORDER — HYDRALAZINE HYDROCHLORIDE 50 MG/1
50 TABLET, FILM COATED ORAL 3 TIMES DAILY
Qty: 90 TABLET | Refills: 0 | Status: ON HOLD | OUTPATIENT
Start: 2022-09-02 | End: 2022-09-29

## 2022-09-02 RX ADMIN — DIGOXIN 125 MCG: 125 TABLET ORAL at 08:28

## 2022-09-02 RX ADMIN — SERTRALINE HYDROCHLORIDE 100 MG: 100 TABLET ORAL at 08:28

## 2022-09-02 RX ADMIN — OXYCODONE HYDROCHLORIDE 5 MG: 5 TABLET ORAL at 20:45

## 2022-09-02 RX ADMIN — LISINOPRIL 10 MG: 10 TABLET ORAL at 08:30

## 2022-09-02 RX ADMIN — ACETAMINOPHEN 975 MG: 325 TABLET, FILM COATED ORAL at 20:46

## 2022-09-02 RX ADMIN — MYCOPHENOLATE MOFETIL 1500 MG: 500 TABLET, FILM COATED ORAL at 20:43

## 2022-09-02 RX ADMIN — ATORVASTATIN CALCIUM 40 MG: 40 TABLET, FILM COATED ORAL at 20:43

## 2022-09-02 RX ADMIN — QUETIAPINE FUMARATE 25 MG: 25 TABLET ORAL at 08:28

## 2022-09-02 RX ADMIN — ACETAMINOPHEN 975 MG: 325 TABLET, FILM COATED ORAL at 06:36

## 2022-09-02 RX ADMIN — WARFARIN SODIUM 5 MG: 5 TABLET ORAL at 17:31

## 2022-09-02 RX ADMIN — SENNOSIDES AND DOCUSATE SODIUM 2 TABLET: 8.6; 5 TABLET ORAL at 20:43

## 2022-09-02 RX ADMIN — POLYETHYLENE GLYCOL 3350 17 G: 17 POWDER, FOR SOLUTION ORAL at 15:41

## 2022-09-02 RX ADMIN — SALINE NASAL SPRAY 2 SPRAY: 1.5 SOLUTION NASAL at 08:33

## 2022-09-02 RX ADMIN — Medication 10 MG: at 20:45

## 2022-09-02 RX ADMIN — HYDROXYCHLOROQUINE SULFATE 200 MG: 200 TABLET, FILM COATED ORAL at 08:28

## 2022-09-02 RX ADMIN — QUETIAPINE FUMARATE 25 MG: 25 TABLET ORAL at 15:41

## 2022-09-02 RX ADMIN — HYDRALAZINE HYDROCHLORIDE 50 MG: 25 TABLET ORAL at 08:29

## 2022-09-02 RX ADMIN — QUETIAPINE FUMARATE 150 MG: 50 TABLET ORAL at 20:46

## 2022-09-02 RX ADMIN — MYCOPHENOLATE MOFETIL 1500 MG: 500 TABLET, FILM COATED ORAL at 08:28

## 2022-09-02 RX ADMIN — MULTIPLE VITAMINS W/ MINERALS TAB 1 TABLET: TAB at 08:28

## 2022-09-02 RX ADMIN — PANTOPRAZOLE SODIUM 40 MG: 40 TABLET, DELAYED RELEASE ORAL at 06:36

## 2022-09-02 RX ADMIN — HYDRALAZINE HYDROCHLORIDE 50 MG: 25 TABLET ORAL at 14:14

## 2022-09-02 RX ADMIN — HYDRALAZINE HYDROCHLORIDE 50 MG: 25 TABLET ORAL at 20:43

## 2022-09-02 RX ADMIN — HYDROXYCHLOROQUINE SULFATE 200 MG: 200 TABLET, FILM COATED ORAL at 20:43

## 2022-09-02 RX ADMIN — ACETAMINOPHEN 975 MG: 325 TABLET, FILM COATED ORAL at 14:14

## 2022-09-02 RX ADMIN — ASPIRIN 81 MG: 81 TABLET, CHEWABLE ORAL at 08:28

## 2022-09-02 RX ADMIN — SALINE NASAL SPRAY 2 SPRAY: 1.5 SOLUTION NASAL at 14:14

## 2022-09-02 RX ADMIN — PANTOPRAZOLE SODIUM 40 MG: 40 TABLET, DELAYED RELEASE ORAL at 15:41

## 2022-09-02 ASSESSMENT — ACTIVITIES OF DAILY LIVING (ADL)
ADLS_ACUITY_SCORE: 30
ADLS_ACUITY_SCORE: 30
ADLS_ACUITY_SCORE: 28
ADLS_ACUITY_SCORE: 30
ADLS_ACUITY_SCORE: 30
ADLS_ACUITY_SCORE: 28
ADLS_ACUITY_SCORE: 28
ADLS_ACUITY_SCORE: 30
ADLS_ACUITY_SCORE: 28
ADLS_ACUITY_SCORE: 28

## 2022-09-02 NOTE — DISCHARGE SUMMARY
Pt alert and oriented X4, able to make needs known. Denied SOB and CP. At 1145 pt had soft light brown bm and blood present in stool. Small amount of blood on brief and toilet paper. Area assess and scant amount of bright red blood on hemmorrhoid.  Resident updated. No change in discharge planning. Pt and daughter educated on dressing change by LVAD coordinator. Nursing educated pt and daughter on follow up apts, medication/ medication schedule. Pt/loly repeated information for understanding. 15 minutes spend on education. supply of all mediations on unit. After discharge education pt requested to use bathroom. Pt had bleeding from anus/hemmorrhoid secondary to straining from urination. Increase in blood vs first episode. Pt evaluated by resident. Labs ordered and discharge changed to undetermined.        Patient's most recent vital signs are:     Vital signs:  BP: 79/65  Temp: 96.2  HR: 61  RR: 16  SpO2: 97 %     Patient does not have new respiratory symptoms.  Patient does not have new sore throat.  Patient does not have a fever greater than 99.5.

## 2022-09-02 NOTE — PROGRESS NOTES
Pt with redness and rash along borders of LVAD DLES dressing. Recommended the following modifications to pt and daughter today:  Remove old dressing and allow skin to be open to air for approx 15 minutes, keeping dressing change precautions in place (masks on, windows closed, fans off, pets out of room). Clean site as normal. Apply 4x4 split gauze and 4x4 gauze over DLES. Open additional 4x4 gauze package and unfold each gauze once, and place over the 4x4's, to create an 8x8 dressing. Use medipore tape to secure edges (skip skin prep). Continue doing this until skin heals.   Pt will be sent home with 5 dressing change kits, medipore tape, and extra 4x4 gauze.

## 2022-09-02 NOTE — PLAN OF CARE
FOCUS/GOAL  Medical management    ASSESSMENT, INTERVENTIONS AND CONTINUING PLAN FOR GOAL:    Patient slept all night. Does not want to be disturbed, cares clustered. No complaints of pain, sob, dizziness, Nausea or any new weakness. Lvad parameters in normal range on random inspection. Used the urinal independently and staff to empty. Continent of both B&B. Mod I w/ the walker during the day, SBA at night. MAP at 90. Refused for hydralazine to be administered early.     Goal Outcome Evaluation:

## 2022-09-02 NOTE — PLAN OF CARE
FOCUS/GOAL  Bowel management, Bladder management, Pain management, Mobility, Skin integrity, and Safety management    ASSESSMENT, INTERVENTIONS AND CONTINUING PLAN FOR GOAL:  Patient is alert and oriented x 4. Lower back pain scheduled tylenol and PRN oxycodone was effective. Patient denied headache, dizziness CP or SOB. LVAD parameter WDL MAP 94 via doppler. Drive line dressing. No care concern at this time. Plan to go home tomorrow. Call light is in reach alarm is on.   Goal Outcome Evaluation:

## 2022-09-02 NOTE — PROGRESS NOTES
[facility]    Medicine Progress Note - Hospitalist Service    Date of Admission:  8/21/2022    Assessment & Plan                                 9/2  Plan to discharge home today   Since increased dose of hydralazine started 9/1 , further lisinopril titration  can be as outpatient    Dandy EDUARD Sands is a 60 yo male with medical history of SLE, antiphospholipid antibody syndrome, PE on anticoagulation with warfarin, HFrEF due to ICM and HDL.  He initially presented to [facility] in [place] on 6/13/22 with nausea, vomiting and abd bloating.  Transferred to [facility] on 6/17/22 w/ decompensated heart failure, cardiogenic shock and multiorgan failure.  He required IABP and then underwent HM3 LVAD placement on 7/8/22 by Dr. Zamora.  Surgery was complicated with RV failure requiring 29F Protek duo via RIJ RVAD placement ( removed 7/21/22), hemopericardium requiring repeat washout, chest was closed 7/13/22.  Other post LVAD placement complication included epistaxis requiring intubation 8/7/22 for airway protection.  Pt removed his own nasal packing.  He was eventually extubated.  He was transferred to acute inpatient rehab 8/21/2022 for further rehab and cares       HFrEF 2/2 ICM s/p HM3 LVAD in setting of cardiogenic shock   RV failure s/p Protek duo temporary RVAD, s/p decannulation 7/21  RV thrombus  Post chest closure hemopericardium, s/p repeat washout (7/12)  H/o CAD s/p PCI to LAD (2005), S/p CRT-D (2006), NTEMI 2007  ---   Coronary angio 5/2022 showed severe ostial LAD disease with in-stent restenosis, mid LAD dz involved diagonal bifurcation, mild LCx dz and severe prox RCA disease   ---   Evaluated for heart transplant but due to elevated DSAs, decision was made to proceed with LVAD likely as destination therapy  ---   CABG was considered but decision was made for medical therapy in light of his low EF and cardiac MRI viability study showed  "nonviable myocardium in LAD territory    ---   TAVARES 8/20 reveled EF 10-15%  ---   Continued to have intermittent low flow alarm but previous w/u were negative   ---   Encouraged pt to increase his oral intake  ---   Continue hydralazine 50 tid and lisinopril 10 mg daily for afterload reduction  ---   Continue ASA, digoxin and atorvastatin  ---   Pt to wear abd binder and compression stocking  ---   Goal MAP 65-85  ---   On coumadin for RV thrombus w/ INR goal 2-3.    ---   Needs EP evaluation for increasing capture threshold of RV lead        Acute hypoxic respiratory failure ; resolved  ---   Intubated 8/7 for airway protection due to severe epistaxis  ---   Extubated 8/8 w/o any difficulties  ---   Has underlying mild-moderate Emphysema  ---   Seen by pulm consult service at [facility]  ---   Continue Breao Ellipta, albuterol inhaler as needed, PFT as able     ---   Iatrogenic apical PTX, resolved    ---   Respiratory failure resolved     PE  H/o antiphospholipid antibody syndrome  ---   Continue Coumadin, pharmacy is dosing       Epistaxis  ---   Due to Coumadin + ASA  ---   Has a history of nasal perforation and underwent elective procedure in [place]  ---   He required intubation for airway protection   ---   Seen by ENT consult service and underwent catheterization   ---   Patient removed his own nasal packing  ---   Extubated w/o any difficulties  ---   Epistaxis resolved  ---   Per ENT \" If bleeding recurs, spray Afrin (3 sprays) and hold firm digital pressure over the soft part of the nose for 20 minutes continuously.  Nasal clips are ineffective and should not be used in place of digital pressure.  If bleeding continues despite these measures, repeat. If bleeding continues or is severe please contact ENT\"     E.faecalis bacteremia  ---   Blood culture from PICC line (8/2/22), 1 out 2 bottles grew staph epi (probably contaminant) and the other bottle grew E. Faecalis.    ---   NGTD from subsequent " blood cultures   ---   He received Cefepime and Vancomycin, 8/7 - 8/12/22  ---   Treated for oral thrush for 14 days   ---   Has a h/o COVID-19 infection 1/2022 ( needed intubation )        Hematemesis hx  Black tarry stool hx  BRBPR 8/31 ? Hemorrhoids hx   ---   He was seen by GI at [facility], felt melena was due to epistaxis and not GI bleed   ---   Congestive hepatopathy, shocked liver on presentation,  ---   Dysphasia post intubation, resolved   ---   On regular diet   ---   Continue PPI      Extremity and facial numbness 8/22/22  ---   Had no focal neurologic deficit  ---   CT head without contrast was negative for acute intracranial pathology  ---   Resolved     Delirium   Depression  Insomnia  ---   CT head negative for acute intracranial pathology 8/7/22   Continue cares     Acute blood loss anemia  superimposed on BRENDA  ---   Hgb stable 9.9  ---   Not on Fe supplement     SLE  ---   Continue PTA hydroxychloroquine and cellcept 1500 mg po bid      Severe Malnutrition  ---   Based on nutrition assessment   ---   Nutritional supplement being provided                     Diet: Room Service  Combination Diet Regular Diet; Thin Liquids (level 0)  Snacks/Supplements Adult: Ensure Enlive; With Meals  Diet    DVT Prophylaxis: Warfarin  Fitzgerald Catheter: Not present  Central Lines: None  Cardiac Monitoring: None  Code Status: Full Code      Disposition Plan     Expected Discharge Date: 09/02/2022      Destination: home          The patient's care was discussed with the Patient and Primary team.( in person )     Humaira Bey MD  Hospitalist Service  [facility]  Securely message with the Vocera Web Console (learn more here)  Text page via [employer] Paging/Directory         Clinically Significant Risk Factors Present on Admission                      ______________________________________________________________________    Interval History   No new symptoms reported per nursing  staff .  Last night notes reviewed .  No chest pain or Shortness of breath reported.  No vomiting   No difficulty with voiding   Passing gas .    4 system ROS reviewed .    Data reviewed today: I reviewed all medications, new labs and imaging results over the last 24 hours. I    Physical Exam   Vital Signs: Temp: (!) 96.2  F (35.7  C) Temp src: Oral  Pulse: 61   Resp: 16 SpO2: 97 % O2 Device: None (Room air)    Weight: 129 lbs 0 oz   Heartmate 3 LEFT VS  Flow (Lpm): 3.4 Lpm  Pulse Index (PI): 6.5 PI  Speed (rpm): 5300 rpm  Power (persaud): 3.7 persaud    Constitutional: awake, alert, cooperative, no apparent distress, and appears stated age  Eyes: Lids and lashes normal, pupils equal, round and reactive to light, extra ocular muscles intact, sclera clear, conjunctiva normal  Hematologic / Lymphatic: no cervical lymphadenopathy  Respiratory: No increased work of breathing, good air exchange, clear to auscultation bilaterally, no crackles or wheezing  Cardiovascular: LVAD  GI: No scars, normal bowel sounds, soft, non-distended, non-tender, no masses palpated,  Skin: no jaundice  Neurologic: Awake, alert, oriented to name, place and time.  Cranial nerves II-XII are grossly intact.   Neuropsychiatric: General: normal, calm and normal eye contact    Data   Recent Labs   Lab 09/02/22  0606 09/01/22  0606 08/31/22  0637 08/30/22  0554 08/29/22  0804 08/28/22  0747 08/27/22  0816   WBC  --  5.5  --   --  6.8  --  5.6   HGB  --  9.9*  --   --  10.9*  --  10.5*   MCV  --  94  --   --  93  --  92   PLT  --  253  --   --  304  --  289   INR 2.56* 2.25* 2.53*   < > 2.32*   < > 2.19*   NA  --  140  --   --  140  --  139   POTASSIUM  --  3.9  --   --  3.9  --  3.8   CHLORIDE  --  112*  --   --  108  --  111*   CO2  --  22  --   --  24  --  23   BUN  --  13  --   --  14  --  11   CR  --  0.56*  --   --  0.69  --  0.59*   ANIONGAP  --  6  --   --  8  --  5   ELDA  --  9.4  --   --  9.6  --  9.2   GLC  --  94  --   --  104*  --  101*    ALBUMIN  --  3.1*  --   --  3.2*  --   --    PROTTOTAL  --  7.1  --   --  8.1  --   --    BILITOTAL  --  0.4  --   --  0.5  --   --    ALKPHOS  --  131  --   --  156*  --   --    ALT  --  22  --   --  23  --   --    AST  --  21  --   --  24  --   --     < > = values in this interval not displayed.

## 2022-09-02 NOTE — PLAN OF CARE
Occupational Therapy Discharge Summary    Reason for therapy discharge:    All goals and outcomes met, no further needs identified.    Progress towards therapy goal(s). See goals on Care Plan in [facility] electronic health record for goal details.  Goals met    Therapy recommendation(s):    Continued therapy is recommended.  Rationale/Recommendations:  Pt is mod I with ADL routine and recommended to use the walker for mobility. Pt completed the ACE III version US copy A cognitive screen on 8/30 Version with a score of  65/100. A score of 82 or less indicates suspected cognitive impairment.  Pt scored 11/26 on memory portion of assessment. Worked on functional cognition here and pt required oversight and min A with various IADL including med set up and finances. Recommend assist with med management and higher level IADL as well as follow up with OT to continue progressing IND. Son is aware of the assist needed. Pt missed therapy at times but should do better when he can operate on his own schedule. Recommend  OT .

## 2022-09-02 NOTE — PROGRESS NOTES
Pt's daughter present today to complete VAD education with hands on teaching.   All VAD education is complete.   Patient daughter, Asha verbalized understanding of and/or correctly demonstrated the ability to:   -Switch power sources  -Change controller. Demonstrated controller change properly and verbalized understanding that patient should only change controller after discussion with VAD Coordinator and in the presence of a trained caregiver whenever possible.   -Identify controller and states function, power sources, and Battery charger function, alarms, and alarms management.   -Perform Self test on controller   -State VAD precautions and warnings (including but not limited to: travel bags within arms reach at all time, using AC power while sleeping, avoid total immersion in water, boating, MRIs, metal detectors, contact sports, vacuuming or activity that might create static electricity).   -Identify an emergency and state the correct action in the event of an emergency.   -Recognize situations that require paging VAD coordinator and demonstrate proper paging technique.   -Determine procedures that necessitate prophylactic antibiotics.   -Verbalized understanding of VAD parameters, normal limits, and actions required when outside of range.    -Asha demonstrated removing the old dressing, cleaning the exit site, and applying new dressing using sterile technique. Understands need for DL immobilization and signs/symptoms of infection.  -Asha passed written test.

## 2022-09-02 NOTE — PROGRESS NOTES
[facility]   Acute Rehabilitation Unit  Daily progress note    INTERVAL HISTORY  Dandy Sands was seen and examined at bedside. He was doing well this morning and was scheduled for discharge. Had a small BM this AM with small amount on tissue of bleed. At 2:45pm RN notified me of Dandy having a massive bleed with bright red blood while toileting. He had another small BM but was brown. Toilet was filled with blood so was diaper. He was asymptomatic without pain or dizziness. Discussed to keep him and do work up and management for the bleeds.  He was open to me performing a DENISSE. LBM 9/2 with associated significant bright red hematochezia. Aprox 250- 300cc. No SOB, dizziness, pain, fever, chills, abdominal discomfort.      Functionally,   OT 9/1  ADLs:    Mobility: SBA with walker for short distance    Grooming: setup, close SBA standing    Dressing: setup for UB dressing, set up for LB dressing    Bathing: no showers, UB sponge bath set up assist and close SBA standing, pt declined LB washing    Toileting: SBA for transfer and toileting  IADLs: son can assist  Vision/Cognition: wears glasses, cognition assessment: ACE III score on 8/30 Version US copy A is 65/100. A score of 82 or less indicates suspected cognitive impairment.  Pt scored 11/26 on memory portion of assessment    Today's Updates:  - DENISSE: done  - Stat CBC  ( hgb 11.0) then HGB every q8h  - Mini Enemeez  - Increase pantoprazole 40 mg BID  - Scheduled all bowel meds  - Discussed with IM and HF team on new changes: Plan is to observe if continues to bleed ( in next 48 hrs) then there might be a chance to transfer with possible scoping in plan.   - Discussed with GI: suspect its hemorrhoids and ask we keep bowel meds scheduled. Possible review from team. Last colorectal scope 05/2022.    MEDICATIONS    acetaminophen  975 mg Oral Q8H     aspirin  81 mg Oral Daily     atorvastatin  40 mg Oral QPM     digoxin  125 mcg Oral  "Daily     fluticasone-vilanterol  1 puff Inhalation Daily     hydrALAZINE  50 mg Oral TID     hydroxychloroquine  200 mg Oral BID     lisinopril  10 mg Oral Daily     melatonin  10 mg Oral At Bedtime     multivitamin w/minerals  1 tablet Oral Daily     mycophenolate  1,500 mg Oral BID     pantoprazole  40 mg Oral QAM AC     polyethylene glycol  17 g Oral Daily     QUEtiapine  150 mg Oral or Feeding Tube At Bedtime     QUEtiapine  25 mg Oral BID     senna-docusate  1-4 tablet Oral BID     sertraline  100 mg Oral Daily     sodium chloride  2 spray Both Nostrils Q4H While awake     warfarin ANTICOAGULANT  5 mg Oral ONCE at 18:00     Warfarin Therapy Reminder  1 each Oral See Admin Instructions        albuterol, hydrocortisone, LORazepam, naloxone **OR** naloxone **OR** naloxone **OR** naloxone, ondansetron, oxyCODONE, QUEtiapine     PHYSICAL EXAM  BP (!) 79/65 (BP Location: Left arm)   Pulse 61   Temp (!) 96.2  F (35.7  C) (Oral)   Resp 16   Ht 1.702 m (5' 7\")   Wt 58.5 kg (129 lb)   SpO2 97%   BMI 20.20 kg/m    General: Seated up on toilet seat, significant blood on diaper and in the bathroom. Not in acute distress.  HEENT: EOMI, NC/NT, Moist mucous membranes, no pallor noted  Pulmonary:  Bilateral clear airways on auscultation bilaterally  Cardiovascular: LVAD humming noted.   Abdominal: Soft, Nt/ND, + BS, Dry clean abdominal bandages.   DENISSE:  Visible bright red in on the anal area, non bleeding x 3 hemorrhoids, the 6'oclock hemorrhoid looks dark and engorged, no active bleeding observed, and exam with brown stools.   Extremities: warm, well perfused, no edema in bilateral lower extremities, no tenderness in calves power 5/5 in BUE and BLE.  Neuro/MSK: Alert and oriented, face symmetric.     LABS  Recent Results (from the past 24 hour(s))   INR    Collection Time: 09/02/22  6:06 AM   Result Value Ref Range    INR 2.56 (H) 0.85 - 1.15   Extra Green Top (Lithium Heparin) Tube    Collection Time: 09/02/22  6:06 " AM   Result Value Ref Range    Hold Specimen JIC    Extra Purple Top Tube    Collection Time: 09/02/22  6:06 AM   Result Value Ref Range    Hold Specimen JIC      Lab Results   Component Value Date    WBC 7.5 09/02/2022     Lab Results   Component Value Date    RBC 3.74 09/02/2022     Lab Results   Component Value Date    HGB 11.0 09/02/2022     Lab Results   Component Value Date    HCT 35.0 09/02/2022     Lab Results   Component Value Date    MCV 94 09/02/2022     Lab Results   Component Value Date    MCH 29.4 09/02/2022     Lab Results   Component Value Date    MCHC 31.4 09/02/2022     Lab Results   Component Value Date    RDW 17.0 09/02/2022     Lab Results   Component Value Date     09/02/2022         Rehabilitation - continue comprehensive acute inpatient rehabilitation program with multidisciplinary approach including therapies, rehab nursing, and physiatry following. See interval history for updates.      ASSESSMENT AND PLAN  Dandy Sands is a 61 year old right hand dominant male with past medical history of SLE, antiphospholipid syndrome, history pulmonary embolism (on anticoagulation with warfarin), HFrEF due to IMC, HDL. He initially presented to [facility] on 6/13 due to nausea, vomiting, abdominal bloating and transferred to [facility] for admission on 6/17/2022 for decompensated heart failure 2/2 cardiogenic shock c/b multiorgan failure requiring IABP, is s/p HM 3 LVAD on 7/8/2022. Stay was also c/b RV, had 29F Protek duo via RIJ, RVAD removed 7/21, hemopericardium requiring repeat washout, chest was closed 7/13. Other complication epistaxis on 8/6 ENT. Patient has h/o nasal perforation that required elective procedure (in [place]) which was postponed due to LVAD insertion.  He required intubation 8/7 for airway protection, was extubated 8/8.  Nasal pack removed 8/9.     Admission to acute inpatient rehab: Cardiac; s/p HM3 LVAD.    Impairment group code: 09        1. PT, OT 90 minutes of  each on a daily basis, in addition to rehab nursing and close management of physiatrist.       2. Impairment of ADL's: Impairment in strength, endurance, and ROM resulting in functional limitations in patient's ability to perform ADLs.     3. Impairment of mobility:  Impairment in strength, endurance, and ROM resulting in functional limitations in patient's ability to perform functional mobility.     4. Impairment of cognition/language/swallow:  N/A     5. Medical Conditions     #HFrEF 2/2 ICM s/p HM3 LVAD in setting of cardiogenic shock (SCAI D)  #RV failure s/p Protek duo temporary RVAD s/p decannulation 7/21  #RV thrombus  #Post chest closure hemopericardium s/p repeat washout (7/12)  #PMH CAD s/p PCI to LAD (2005)  #S/p CRT-D (2006)  Patient with ICM s/p HM3 LVAD 7/8/22 2/2 cardiogenic shock requiring MCS with IABP as prior to HM (initially evaluated for heart transplant, however noted to have substantially elevated DSAs during course of care, so decision made to proceed with LVAD given patient was already on temporary MCS). Intraoperative course during LVAD placement was c/b RVF resulting in cardiogenic shock requiring high dose pressors necessitating temporary RVAD support. Initial post-op course c/b hemopericardium requiring repeat washout with chest left open, with good recovery extubation and decannulation on 7/21. Patient tolerating hemodynamics and end organ standpoint well. He has had intermittent low flow alarms with high PI, no power spike, and a closed aortic valve on TTE. TTE also showed underfilling LV which could explain his low flow alarms d/t suckdown event so his LVAD speed was reduced to 5200. Patient did have a low flow alarm on 8/20 @ 1145 (PI 8-9 and not hypotensive); CTA chest done which showed that the LVAD outflow tract is widely patent. Patient is euvolemic on exam, but at times has had orthostasis symptoms; patient encouraged to stay hydrated within his fluid/diet restriction guidelines  (no more than 2L/day of fluid intake and no more than 3g of Na per day in dietary intake).  P:  LVAD: Appreciate   - interrogation showed 1 low flow alarm in last 24 hours as described above  - Is euvolemic since 8/21  - Diuretics: Not needed at this time; monitor volume status and weight and consider starting low dose Lasix if he gains weight or starts showing signs of hypervolemia  - Afterload reduction: MAP goal 65-80; Hydralazine 37.5 TID + Lisinopril 2.5 qday  - Statin: Atorvastatin 40mg  - BB: Holding in setting of recent cardiogenic shock and orthostatic symptoms, consider restarting outpatient   - AA: Holding in setting of recent cardiogenic shock and orthostatic symptoms, consider restarting outpatient  - SGLTi: As outpatient, has been held due to immunosuppression he is on for his SLE.  - Warfarin for INR goal 2-3. INR 2.56 today  - ASA 81 mg qday, and Digoxin     #Epistaxis, resolved  #Intubated due to Concern for airway protection (8/7)-Extubated (8/8)  #Mild-moderate emphysema  #History of COVID-19  #Iatrogenic R apical PTX  H/o nasal perforation  For elective procedure (in [place]), postponed 2/2 LVAD insertion. Epistaxis not fixed by packing overnight on 8/6 and pt continued to bleed. Intubated for airway protection. Controlled at bedside by ENT s/p cautery and packing. Extubated 8/8. Had Cefepime/Vanc empirically for broad coverage for PNA (8/7-8/12). Patient removed his own nasal packing overnight on 8/9. ENT no longer following.    - C/t Ayr nasal saline gel at bedtime + nasal saline q4H while awake 8/24  - Continue as needed inhalers, PFT as able  --- Breo Ellipta discontinued 8/25.  Patient not using.  -  followup with his ENT as OP (see below)     #Insomnia/Delirium, resolved  Some concern that he had a fall overnight on 8/6. Discussed with nursing staff and he did NOT have a fall, although he did try to get out of bed. CT head was obtained (8/7) and it was unremarkable. Patient had some  issues with delirium and insomnia during hospitalization and psychiatry was consulted who recommended  - Sertraline and Quetiapine per psych recs     # ? Orthostatic  Noted to be dizzy with therapy.  Has occasional hypotension with SBP high 80s.  May benefit from compressions.  -Compression socks and, abdominal binder 8/22     #GERD  #Congestive hepatopathy in the setting of cardiac insuffiency - Resolved  #Hematemesis / oral mucosal bleeding -resolved    #Dysphagia  GI consulted 7/31 for black tarry stools and a possible GI bleed, however it is more likely swallowed blood from epistaxis that patient previously had. GI did not recommend an upper endoscopy. Recurrence of bloody stools likely due to epistaxis which have resolved. Hemoglobin was monitored and continued to be stable throughout the rest of the hospitalization. Cleared for regular diet by SLP.  - 8/31 monitor for hematochezia. CBC in AM and monitor INR. Will do DENISSE if another episode is noted.  - 9/1 endorses hemorrhoids. No hematochezia today. Continue Hydrocortisone crea cream.  - 9/2 Possible hemorrhoids bleed vs Lower GI bleed higher up.   ----- Appreciate recs from IM and HF team  ----- Schedule all bowel meds and added Mini enemeeze  ----- Pantoprazole BID  ----- Q8H for Hgb in next 24 hrs  ----- Monitor for reoccurrence.     #Anemia due to blood loss from Epistaxis-Resolved  #History of DVT and PE   #Antiphospholipid antibody syndrome  #History of iron deficiency anemia  As noted above, likely related to epistaxis. After above interventions were complete and management was initiated per ENT recs, hemoglobin remained stable and no further evidence of bleeding.  - Saline (OCEAN) nasal sprays Q4H when awake.     #SLE  - PTA meds: hydroxychloroquine 200mg, resumed 7/16  - Restart PTA Cellcept at discharge     #Oral thrush, resolved  Completed 14 days of nystatin        6. Adjustment to disability:  Clinical psychology to eval and treat as  needed  7. FEN:  Regular with thins  8. Bowel: As needed MiraLAX and Senokot  9. Bladder: Pending review  10. DVT Prophylaxis: Warfarin goal 2-3 for LVAD, daily INR, and ASA 81  11. GI Prophylaxis: Pantoprazole 40 mg daily  12. Code: Full  13. Disposition: Home with homecare and Home with outpatient therapy  14. ELOS: 12 to 14 days.  15. Rehab prognosis: Fair   16. Follow up Appointments on Discharge:   - PCP follow-up 2 to 3 weeks : Requires CBC BMP  - Follow-up with [facility] at next available appointment and follow-up with your cardiologist in the next few months at next available appointment.  - Follow-up with psychiatry post discharge  - Followup with his ENT in [place] to rediscuss possible procedure for his nasal perforation vs. conservative management  - Dental follow up as OP on 9/7.        Patient seen and discussed with Dr. Mathew, PM&R Staff Physician    Padmini Linda MD   Resident Physician, PGY-2   Physical Medicine and Rehabilitation  [facility]

## 2022-09-02 NOTE — PROVIDER NOTIFICATION
Informed by PMR that patient had large blood per rectum   Spoke with patient and his daughter   This has occurred before   LVAD ok and he feels ok       Discuss with primary team to monitor hb every 8 hours , cancel discharge , increase PPI to BID , monitor for further bleeding, inform HF service and consider GI work up     Patent reports no sue or emesis .

## 2022-09-03 LAB
HGB BLD-MCNC: 10 G/DL (ref 13.3–17.7)
HGB BLD-MCNC: 10.4 G/DL (ref 13.3–17.7)
HGB BLD-MCNC: 10.6 G/DL (ref 13.3–17.7)
HOLD SPECIMEN: NORMAL
HOLD SPECIMEN: NORMAL
INR PPP: 2.64 (ref 0.85–1.15)

## 2022-09-03 PROCEDURE — 250N000013 HC RX MED GY IP 250 OP 250 PS 637: Performed by: NURSE PRACTITIONER

## 2022-09-03 PROCEDURE — 99231 SBSQ HOSP IP/OBS SF/LOW 25: CPT | Performed by: INTERNAL MEDICINE

## 2022-09-03 PROCEDURE — 250N000013 HC RX MED GY IP 250 OP 250 PS 637: Performed by: PHYSICAL MEDICINE & REHABILITATION

## 2022-09-03 PROCEDURE — 250N000013 HC RX MED GY IP 250 OP 250 PS 637: Performed by: INTERNAL MEDICINE

## 2022-09-03 PROCEDURE — 36415 COLL VENOUS BLD VENIPUNCTURE: CPT

## 2022-09-03 PROCEDURE — 85610 PROTHROMBIN TIME: CPT

## 2022-09-03 PROCEDURE — 128N000003 HC R&B REHAB

## 2022-09-03 PROCEDURE — 250N000013 HC RX MED GY IP 250 OP 250 PS 637

## 2022-09-03 PROCEDURE — 250N000012 HC RX MED GY IP 250 OP 636 PS 637

## 2022-09-03 PROCEDURE — 85018 HEMOGLOBIN: CPT

## 2022-09-03 PROCEDURE — 99232 SBSQ HOSP IP/OBS MODERATE 35: CPT | Mod: GC | Performed by: PHYSICAL MEDICINE & REHABILITATION

## 2022-09-03 RX ORDER — WARFARIN SODIUM 7.5 MG/1
7.5 TABLET ORAL
Status: COMPLETED | OUTPATIENT
Start: 2022-09-03 | End: 2022-09-03

## 2022-09-03 RX ORDER — HYDROCORTISONE 10 MG/G
CREAM TOPICAL 3 TIMES DAILY
Status: DISCONTINUED | OUTPATIENT
Start: 2022-09-03 | End: 2022-09-04 | Stop reason: HOSPADM

## 2022-09-03 RX ADMIN — SALINE NASAL SPRAY 2 SPRAY: 1.5 SOLUTION NASAL at 09:24

## 2022-09-03 RX ADMIN — POLYETHYLENE GLYCOL 3350 17 G: 17 POWDER, FOR SOLUTION ORAL at 08:58

## 2022-09-03 RX ADMIN — WARFARIN SODIUM 7.5 MG: 7.5 TABLET ORAL at 17:07

## 2022-09-03 RX ADMIN — QUETIAPINE FUMARATE 25 MG: 25 TABLET ORAL at 08:58

## 2022-09-03 RX ADMIN — ACETAMINOPHEN 975 MG: 325 TABLET, FILM COATED ORAL at 21:00

## 2022-09-03 RX ADMIN — MYCOPHENOLATE MOFETIL 1500 MG: 500 TABLET, FILM COATED ORAL at 08:58

## 2022-09-03 RX ADMIN — SALINE NASAL SPRAY 2 SPRAY: 1.5 SOLUTION NASAL at 13:21

## 2022-09-03 RX ADMIN — SALINE NASAL SPRAY 2 SPRAY: 1.5 SOLUTION NASAL at 17:08

## 2022-09-03 RX ADMIN — SENNOSIDES AND DOCUSATE SODIUM 1 TABLET: 8.6; 5 TABLET ORAL at 08:58

## 2022-09-03 RX ADMIN — DIGOXIN 125 MCG: 125 TABLET ORAL at 08:58

## 2022-09-03 RX ADMIN — HYDROCORTISONE: 10 CREAM TOPICAL at 20:57

## 2022-09-03 RX ADMIN — SENNOSIDES AND DOCUSATE SODIUM 2 TABLET: 8.6; 5 TABLET ORAL at 20:57

## 2022-09-03 RX ADMIN — HYDROCORTISONE: 10 CREAM TOPICAL at 11:32

## 2022-09-03 RX ADMIN — QUETIAPINE FUMARATE 150 MG: 50 TABLET ORAL at 20:57

## 2022-09-03 RX ADMIN — OXYCODONE HYDROCHLORIDE 5 MG: 5 TABLET ORAL at 20:56

## 2022-09-03 RX ADMIN — HYDROXYCHLOROQUINE SULFATE 200 MG: 200 TABLET, FILM COATED ORAL at 08:58

## 2022-09-03 RX ADMIN — HYDRALAZINE HYDROCHLORIDE 50 MG: 25 TABLET ORAL at 08:58

## 2022-09-03 RX ADMIN — QUETIAPINE FUMARATE 25 MG: 25 TABLET ORAL at 17:08

## 2022-09-03 RX ADMIN — HYDRALAZINE HYDROCHLORIDE 50 MG: 25 TABLET ORAL at 20:57

## 2022-09-03 RX ADMIN — Medication 10 MG: at 20:59

## 2022-09-03 RX ADMIN — ATORVASTATIN CALCIUM 40 MG: 40 TABLET, FILM COATED ORAL at 20:57

## 2022-09-03 RX ADMIN — PANTOPRAZOLE SODIUM 40 MG: 40 TABLET, DELAYED RELEASE ORAL at 17:07

## 2022-09-03 RX ADMIN — LISINOPRIL 10 MG: 10 TABLET ORAL at 08:58

## 2022-09-03 RX ADMIN — HYDRALAZINE HYDROCHLORIDE 50 MG: 25 TABLET ORAL at 13:18

## 2022-09-03 RX ADMIN — ACETAMINOPHEN 975 MG: 325 TABLET, FILM COATED ORAL at 13:18

## 2022-09-03 RX ADMIN — HYDROCORTISONE: 10 CREAM TOPICAL at 13:21

## 2022-09-03 RX ADMIN — ASPIRIN 81 MG: 81 TABLET, CHEWABLE ORAL at 08:58

## 2022-09-03 RX ADMIN — SERTRALINE HYDROCHLORIDE 100 MG: 100 TABLET ORAL at 08:58

## 2022-09-03 RX ADMIN — HYDROXYCHLOROQUINE SULFATE 200 MG: 200 TABLET, FILM COATED ORAL at 20:57

## 2022-09-03 RX ADMIN — OXYCODONE HYDROCHLORIDE 5 MG: 5 TABLET ORAL at 03:25

## 2022-09-03 RX ADMIN — PANTOPRAZOLE SODIUM 40 MG: 40 TABLET, DELAYED RELEASE ORAL at 06:28

## 2022-09-03 RX ADMIN — MULTIPLE VITAMINS W/ MINERALS TAB 1 TABLET: TAB at 08:58

## 2022-09-03 RX ADMIN — MYCOPHENOLATE MOFETIL 1500 MG: 500 TABLET, FILM COATED ORAL at 20:56

## 2022-09-03 RX ADMIN — ACETAMINOPHEN 975 MG: 325 TABLET, FILM COATED ORAL at 06:29

## 2022-09-03 RX ADMIN — SALINE NASAL SPRAY 2 SPRAY: 1.5 SOLUTION NASAL at 20:58

## 2022-09-03 ASSESSMENT — ACTIVITIES OF DAILY LIVING (ADL)
ADLS_ACUITY_SCORE: 30

## 2022-09-03 NOTE — PROGRESS NOTES
[facility], [place]   Physical Medicine and Rehabilitation Daily Note           Assessment and Plan of Care:   Dandy Sands is a 61 year old right hand dominant male with past medical history of SLE, antiphospholipid syndrome, history pulmonary embolism (on anticoagulation with warfarin), HFrEF due to IMC, HDL. He initially presented to [facility] on 6/13 due to nausea, vomiting, abdominal bloating and transferred to [facility] for admission on 6/17/2022 for decompensated heart failure 2/2 cardiogenic shock c/b multiorgan failure requiring IABP, is s/p HM 3 LVAD on 7/8/2022. Stay was also c/b RV, had 29F Protek duo via RIJ, RVAD removed 7/21, hemopericardium requiring repeat washout, chest was closed 7/13. Other complication epistaxis on 8/6 ENT. Patient has h/o nasal perforation that required elective procedure (in [place]) which was postponed due to LVAD insertion.  He required intubation 8/7 for airway protection, was extubated 8/8.  Nasal pack removed 8/9. His discharge was held by hematochezia on 9/2 pending further review.       He is currently being monitored for any possible hematochezia over the next 24 hrs.  Continues to remain medically stable, and is able to maintain current sustaining cares, and is meeting basic needs. Please refer to last note from primary team for a more complete problem list and plan of care.  -- No acute issues overnight.  -- Functionally, No PT/ OT but is ambulating in the unit.  --Vitals stable, with the current LVAD settings, has occasional asymptomatic low flow.    -- Labs today. Hgb q8H (stable at 10.4 at 06:00) and INR 2.64.   -- Bowel:   ---- Try having a BM this afternoon by noon. If he has another episode of hematochezia will inform hospitalist for possible transfer and for further management. If no hematochezia then goal is to discharge tomorrow and follow up with GI as OP.  ---- Continue with scheduled Bowel meds.   ---- Scheduled  Hydrocortisone CREA cream to BID.  ---- Started PREP-H cream BID PRN with every BM.  -- Continue ongoing medical management.  --Continue plan of care.         Interval history:   He says he had a good night and slept well. Was anxious about the plan but discussed with him the need to continue to monitor for any more bleeding with BM. He is going to try going again today. Says normally they will be every other day. We talked to him the need for the bowel meds to help with reducing straining , and try having a BM this midday. He is open to staying another night and try to have a BM today. Talked to him about following up with GI as outpatient.   Denies fever, chills, CP, SOB, N/V, abdominal pain, new pain or weakness/numbness/tingling. No new issues with bowel or bladder.           Physical Exam:     Vitals:    09/02/22 0636 09/02/22 0654 09/02/22 1500 09/03/22 0823   BP Location: Left arm   Left arm   Patient Position: Semi-Alvarez's      Cuff Size: Adult Regular      Pulse:  61 69 58   Resp:  16 16 16   Temp:  (!) 96.2  F (35.7  C) (!) 96.1  F (35.6  C) (!) 96.2  F (35.7  C)   TempSrc:  Oral Oral Oral   SpO2:  97% 98% 99%   Weight:       Height:         Gen: Laying in bed not in acute distress.   Heart:  LVAD humming.  Lungs: clear breath sounds b/l on ascultation.  Abd: soft and non-tender, present BS  Ext: wwp, no edema in BLE, no tenderness in calves.  MSK/neuro: alert and oriented. speech fluent. moves BUE and BLE volitionally, with power 5/5.          Data:   CBC:   Recent Labs   Lab Test 09/03/22  0607 09/02/22  2200 09/02/22  1455   WBC  --   --  7.5   RBC  --   --  3.74*   HGB 10.4*   < > 11.0*   MCV  --   --  94   MCH  --   --  29.4   MCHC  --   --  31.4*   RDW  --   --  17.0*   PLT  --   --  312    < > = values in this interval not displayed.     BMP:   Recent Labs   Lab Test 09/01/22  0606      POTASSIUM 3.9   CHLORIDE 112*   CO2 22   BUN 13   CR 0.56*   GLC 94     Other labs: Follow Hgb and INR in  AM 9/3.    Scheduled meds    acetaminophen  975 mg Oral Q8H     aspirin  81 mg Oral Daily     atorvastatin  40 mg Oral QPM     digoxin  125 mcg Oral Daily     fluticasone-vilanterol  1 puff Inhalation Daily     hydrALAZINE  50 mg Oral TID     hydrocortisone   Rectal TID     hydroxychloroquine  200 mg Oral BID     lisinopril  10 mg Oral Daily     melatonin  10 mg Oral At Bedtime     multivitamin w/minerals  1 tablet Oral Daily     mycophenolate  1,500 mg Oral BID     pantoprazole  40 mg Oral BID AC     polyethylene glycol  17 g Oral Daily     QUEtiapine  150 mg Oral or Feeding Tube At Bedtime     QUEtiapine  25 mg Oral BID     senna-docusate  1-4 tablet Oral BID     sertraline  100 mg Oral Daily     sodium chloride  2 spray Both Nostrils Q4H While awake     warfarin ANTICOAGULANT  7.5 mg Oral ONCE at 18:00     Warfarin Therapy Reminder  1 each Oral See Admin Instructions       PRN meds:  albuterol, docusate sodium, LORazepam, naloxone **OR** naloxone **OR** naloxone **OR** naloxone, ondansetron, oxyCODONE, pramox-pe-glycerin-petrolatum, QUEtiapine    Patient was seen and staffed with Dr. Gisell Barnes.    Padmini Linda MD  Resident Physician PGY-2  Physical Medicine and Rehabilitation           I, Gisell Barnes, saw this patient with my resident Dr. Linda and agree with her findings and plan of care as documented in her note.     I personally reviewed the chart (vitals signs, medications, and labs).     My key findings and jimenez manegement decisions made by me:   Was doing well this morning. Reviewed the plan with hospitalist team; appreciate assistance.   Later in the afternoon he had a BM with no bleeding. Will repeat INR and Hgb in am and most likely discharge home if stable.     I spent a total of 30 minutes face-to-face and managing the care of the patient. Over 50% of my time on the unit was spent counseling the patient and coordinating care. See note for details.

## 2022-09-03 NOTE — PLAN OF CARE
Goal Outcome Evaluation:    Plan of Care Reviewed With: patient     Overall Patient Progress: improving    FOCUS/GOAL  Bowel management, Bladder management, Pain management, Mobility, Skin integrity, and Safety management    ASSESSMENT, INTERVENTIONS AND CONTINUING PLAN FOR GOAL:    Pt A/O x4. Transfers CGA using ww. Continent of bowel and bladder, no BM this shift, no bleeding noted. Reports low back pain, utilizes prn oxycodone at HS. Regular diet/thin liquids, takes meds whole, good appetite. LVAD within paramaters. MAP 80.

## 2022-09-03 NOTE — PROGRESS NOTES
BRIEF GI NOTE    Mr. Sands is a 61 years old M with PMH of HFrEF now with LVAD placement since 07/08/2022, SLE, antiphospholipid syndrome, PE on warfarin, initially admitted on 06/17/2022 with decompensated heart failure, cardiogenic shock, multiorgan failure. GI was consulted for new onset hematochezia.    Underwent LVAD placement on 07/08/2022. Complicated then by RV failure, hemopericardium, epistaxis leading to intubation. Transferred then to acute inpatient rehab on 08/21/2022.    Patient was supposed to be discharged today, but had an episode of large hematochezia during bowel movement this afternoon. Per conversation with primary team, DENISSE did not reveal any blood, Hg has been stable, and patient hemodynamically stable and asymptomatic.    Large hematochezia is unlikely to be explained from hemorrhoidal bleeding. If patient's bleeding episode was small, that could be explained from hemorrhoids. If it was large, that would likely be due to an AVM (which would more likely not re-bleed). Unlikely other causes given recent colonoscopy on 05/2022 did not reveal other findings, such as diverticulosis.    RECOMMENDATIONS:    Ensure active type & screen  Transfuse if Hg<7  CBC daily  If ongoing bleeding or persistent Hg drop, would recommend transfer to the hospital for further evaluation.   No indication for endoscopic procedure at this time.      Please obtain CTA if hemodynamic instability with active bleeding, consult IR for possible embolization    Gal Eubanks MD  Gastroenterology Fellow

## 2022-09-03 NOTE — PROGRESS NOTES
[facility]    Medicine Progress Note - Hospitalist Service    Date of Admission:  8/21/2022    Assessment & Plan                                            9/3    Monitor hb   Plan for discharge in am if hb stable and no more episodes of rectal  bleeding .  Discussed with PMR     Dandy EDUARD Sands is a 62 yo male with medical history of SLE, antiphospholipid antibody syndrome, PE on anticoagulation with warfarin, HFrEF due to ICM and HDL.  He initially presented to [facility] in [place] on 6/13/22 with nausea, vomiting and abd bloating.  Transferred to [facility] on 6/17/22 w/ decompensated heart failure, cardiogenic shock and multiorgan failure.  He required IABP and then underwent HM3 LVAD placement on 7/8/22 by Dr. Zamora.  Surgery was complicated with RV failure requiring 29F Protek duo via RIJ RVAD placement ( removed 7/21/22), hemopericardium requiring repeat washout, chest was closed 7/13/22.  Other post LVAD placement complication included epistaxis requiring intubation 8/7/22 for airway protection.  Pt removed his own nasal packing.  He was eventually extubated.  He was transferred to acute inpatient rehab 8/21/2022 for further rehab and cares       HFrEF 2/2 ICM s/p HM3 LVAD in setting of cardiogenic shock   RV failure s/p Protek duo temporary RVAD, s/p decannulation 7/21  RV thrombus  Post chest closure hemopericardium, s/p repeat washout (7/12)  H/o CAD s/p PCI to LAD (2005), S/p CRT-D (2006), NTEMI 2007  ---   Coronary angio 5/2022 showed severe ostial LAD disease with in-stent restenosis, mid LAD dz involved diagonal bifurcation, mild LCx dz and severe prox RCA disease   ---   Evaluated for heart transplant but due to elevated DSAs, decision was made to proceed with LVAD likely as destination therapy  ---   CABG was considered but decision was made for medical therapy in light of his low EF and cardiac MRI viability study showed  "nonviable myocardium in LAD territory    ---   TAVARES 8/20 reveled EF 10-15%  ---   Continued to have intermittent low flow alarm but previous w/u were negative   ---   Encouraged pt to increase his oral intake  ---   Continue hydralazine 50 tid and lisinopril 10 mg daily for afterload reduction  ---   Continue ASA, digoxin and atorvastatin  ---   Pt to wear abd binder and compression stocking  ---   Goal MAP 65-85  ---   On coumadin for RV thrombus w/ INR goal 2-3.    ---   Needs EP evaluation for increasing capture threshold of RV lead        Acute hypoxic respiratory failure ; resolved  ---   Intubated 8/7 for airway protection due to severe epistaxis  ---   Extubated 8/8 w/o any difficulties  ---   Has underlying mild-moderate Emphysema  ---   Seen by pulm consult service at [facility]  ---   Continue Breao Ellipta, albuterol inhaler as needed, PFT as able     ---   Iatrogenic apical PTX, resolved    ---   Respiratory failure resolved     PE  H/o antiphospholipid antibody syndrome  ---   Continue Coumadin, pharmacy is dosing       Epistaxis  ---   Due to Coumadin + ASA  ---   Has a history of nasal perforation and underwent elective procedure in [place]  ---   He required intubation for airway protection   ---   Seen by ENT consult service and underwent catheterization   ---   Patient removed his own nasal packing  ---   Extubated w/o any difficulties  ---   Epistaxis resolved  ---   Per ENT \" If bleeding recurs, spray Afrin (3 sprays) and hold firm digital pressure over the soft part of the nose for 20 minutes continuously.  Nasal clips are ineffective and should not be used in place of digital pressure.  If bleeding continues despite these measures, repeat. If bleeding continues or is severe please contact ENT\"     E.faecalis bacteremia  ---   Blood culture from PICC line (8/2/22), 1 out 2 bottles grew staph epi (probably contaminant) and the other bottle grew E. Faecalis.    ---   NGTD from subsequent " blood cultures   ---   He received Cefepime and Vancomycin, 8/7 - 8/12/22  ---   Treated for oral thrush for 14 days   ---   Has a h/o COVID-19 infection 1/2022 ( needed intubation )        Hematemesis hx  Black tarry stool hx  BRBPR 8/31 ? Hemorrhoids hx   ---   He was seen by GI at [facility], felt melena was due to epistaxis and not GI bleed   ---   Congestive hepatopathy, shocked liver on presentation,  ---   Dysphasia post intubation, resolved   ---   On regular diet   ---   Continue PPI      Extremity and facial numbness 8/22/22  ---   Had no focal neurologic deficit  ---   CT head without contrast was negative for acute intracranial pathology  ---   Resolved     Delirium   Depression  Insomnia  ---   CT head negative for acute intracranial pathology 8/7/22   Continue cares     Acute blood loss anemia  superimposed on BRENDA  ---   Hgb stable 9.9  ---   Not on Fe supplement     SLE  ---   Continue PTA hydroxychloroquine and cellcept 1500 mg po bid      Severe Malnutrition  ---   Based on nutrition assessment   ---   Nutritional supplement being provided                       Diet: Room Service  Combination Diet Regular Diet; Thin Liquids (level 0)  Snacks/Supplements Adult: Ensure Enlive; With Meals  Diet    DVT Prophylaxis: Warfarin  Fitzgerald Catheter: Not present  Central Lines: None  Cardiac Monitoring: None  Code Status: Full Code         The patient's care was discussed with the Bedside Nurse and Primary team.    Humaira Bey MD  Hospitalist Service  [facility]  Securely message with the Vocera Web Console (learn more here)  Text page via [email or web address] Paging/Directory         Clinically Significant Risk Factors Present on Admission                      ______________________________________________________________________    Interval History   BRBPR yesterday   None since   No cp   No sob   No fever   No chills  No LVAD alarms      Data reviewed today: I reviewed all  medications, new labs     Physical Exam   Vital Signs: Temp: (!) 96.2  F (35.7  C) Temp src: Oral BP: (!) 87/65 (MAP 82 on monitor) Pulse: 79   Resp: 16 SpO2: 99 % O2 Device: None (Room air)       Heartmate 3 LEFT VS  Flow (Lpm): 3.1 Lpm  Pulse Index (PI): 6.6 PI  Speed (rpm): 5200 rpm  Power (persaud): 3.6 persaud      Weight: 129 lbs 0 oz  Constitutional: awake, alert, cooperative, no apparent distress, and appears stated age  Eyes: Lids and lashes normal, pupils equal, round and reactive to light, extra ocular muscles intact, sclera clear, conjunctiva normal  Respiratory: No increased work of breathing, good air exchange, clear to auscultation bilaterally, no crackles or wheezing  Cardiovascular:LVAD  GI:  normal bowel sounds, soft, non-distended, non-tender, no masses palpated,   Skin: no jaundice  Neurologic: Awake, alert, oriented to name, place and time.   normal.  Neuropsychiatric: General: normal, calm and normal eye contact    Data   Recent Labs   Lab 09/03/22  0607 09/02/22  2200 09/02/22  1455 09/02/22  0606 09/01/22  0606 08/30/22  0554 08/29/22  0804   WBC  --   --  7.5  --  5.5  --  6.8   HGB 10.4* 11.0* 11.0*  --  9.9*  --  10.9*   MCV  --   --  94  --  94  --  93   PLT  --   --  312  --  253  --  304   INR 2.64*  --   --  2.56* 2.25*   < > 2.32*   NA  --   --   --   --  140  --  140   POTASSIUM  --   --   --   --  3.9  --  3.9   CHLORIDE  --   --   --   --  112*  --  108   CO2  --   --   --   --  22  --  24   BUN  --   --   --   --  13  --  14   CR  --   --   --   --  0.56*  --  0.69   ANIONGAP  --   --   --   --  6  --  8   ELDA  --   --   --   --  9.4  --  9.6   GLC  --   --   --   --  94  --  104*   ALBUMIN  --   --   --   --  3.1*  --  3.2*   PROTTOTAL  --   --   --   --  7.1  --  8.1   BILITOTAL  --   --   --   --  0.4  --  0.5   ALKPHOS  --   --   --   --  131  --  156*   ALT  --   --   --   --  22  --  23   AST  --   --   --   --  21  --  24    < > = values in this interval not displayed.

## 2022-09-03 NOTE — PLAN OF CARE
FOCUS/GOAL  Medical management    ASSESSMENT, INTERVENTIONS AND CONTINUING PLAN FOR GOAL:  Patient alert and oriented, able to make needs known  LVAD parameters WNL, MAP 80  Continent of Bladder, utilized urinal at night, No BM this shift  Slept On and Off at night, complained of low back pain, PRN Oxycodone given and was effective, otherwise patient denied headache, chest pain, N&V, no SOB  Safety rounding checked completed, 3 side rails UP, bed alarm ON, call light in reach  May continue with POC.  Goal Outcome Evaluation:

## 2022-09-03 NOTE — PLAN OF CARE
Goal Outcome Evaluation:    Plan of Care Reviewed With: patient     Overall Patient Progress: improving    Outcome Evaluation: Pt continues to advocate for pain management, pt is knowledgable of LVAD dressing change, failed MAP family to assist with medications.      FOCUS/GOAL  Medication management, Pain management, and Wound care management     ASSESSMENT, INTERVENTIONS AND CONTINUING PLAN FOR GOAL:  Pt Aox4, using call light to make needs known.  Pt is REJI durring day if hooked to batteries, pt is compliant with calling when not on batteries.  Denies pain at beginning of shift, mild back pain controlled with scheduled tylenol on writers shift.  LVAD parameters met. MAP still slightly high but hospitalist has adjusted BP meds last few days.  Pt is set to discharge with son tomorrow as long as pt does not have any more bleeding from hemorrhoids with stool, steroid cream applied to rectum is now scheduled. Cont of B&B, LBM 9/2/2022. Reg/thin, taking pills whole with water.  Nursing will continue with POC.

## 2022-09-04 ENCOUNTER — CARE COORDINATION (OUTPATIENT)
Dept: CARDIOLOGY | Facility: CLINIC | Age: 62
End: 2022-09-04

## 2022-09-04 ENCOUNTER — HOSPITAL ENCOUNTER (OUTPATIENT)
Facility: CLINIC | Age: 62
Setting detail: OBSERVATION
Discharge: HOME | DRG: 949 | End: 2022-09-05
Attending: EMERGENCY MEDICINE | Admitting: INTERNAL MEDICINE
Payer: COMMERCIAL

## 2022-09-04 VITALS
OXYGEN SATURATION: 96 % | BODY MASS INDEX: 20.47 KG/M2 | TEMPERATURE: 96.1 F | WEIGHT: 130.4 LBS | HEIGHT: 67 IN | HEART RATE: 86 BPM | SYSTOLIC BLOOD PRESSURE: 87 MMHG | RESPIRATION RATE: 16 BRPM | DIASTOLIC BLOOD PRESSURE: 65 MMHG

## 2022-09-04 DIAGNOSIS — F41.9 ANXIETY: ICD-10-CM

## 2022-09-04 DIAGNOSIS — R04.0 RECURRENT EPISTAXIS: ICD-10-CM

## 2022-09-04 DIAGNOSIS — R11.0 NAUSEA: ICD-10-CM

## 2022-09-04 DIAGNOSIS — N40.0 BENIGN PROSTATIC HYPERPLASIA WITHOUT LOWER URINARY TRACT SYMPTOMS: ICD-10-CM

## 2022-09-04 DIAGNOSIS — Z79.01 LONG TERM (CURRENT) USE OF ANTICOAGULANTS: ICD-10-CM

## 2022-09-04 DIAGNOSIS — R42 DIZZINESS AND GIDDINESS: ICD-10-CM

## 2022-09-04 DIAGNOSIS — F51.02 ADJUSTMENT INSOMNIA: ICD-10-CM

## 2022-09-04 DIAGNOSIS — Z95.811 LVAD (LEFT VENTRICULAR ASSIST DEVICE) PRESENT (H): ICD-10-CM

## 2022-09-04 DIAGNOSIS — Z95.811 LVAD (LEFT VENTRICULAR ASSIST DEVICE) PRESENT (H): Primary | ICD-10-CM

## 2022-09-04 DIAGNOSIS — R42 LIGHTHEADEDNESS: ICD-10-CM

## 2022-09-04 LAB
BASOPHILS # BLD AUTO: 0 10E3/UL (ref 0–0.2)
BASOPHILS NFR BLD AUTO: 1 %
EOSINOPHIL # BLD AUTO: 0.1 10E3/UL (ref 0–0.7)
EOSINOPHIL NFR BLD AUTO: 2 %
ERYTHROCYTE [DISTWIDTH] IN BLOOD BY AUTOMATED COUNT: 17.2 % (ref 10–15)
HCT VFR BLD AUTO: 35 % (ref 40–53)
HGB BLD-MCNC: 11.1 G/DL (ref 13.3–17.7)
HGB BLD-MCNC: 11.1 G/DL (ref 13.3–17.7)
HOLD SPECIMEN: NORMAL
IMM GRANULOCYTES # BLD: 0 10E3/UL
IMM GRANULOCYTES NFR BLD: 0 %
INR PPP: 2.69 (ref 0.85–1.15)
LYMPHOCYTES # BLD AUTO: 1.4 10E3/UL (ref 0.8–5.3)
LYMPHOCYTES NFR BLD AUTO: 20 %
MCH RBC QN AUTO: 29.3 PG (ref 26.5–33)
MCHC RBC AUTO-ENTMCNC: 31.7 G/DL (ref 31.5–36.5)
MCV RBC AUTO: 92 FL (ref 78–100)
MONOCYTES # BLD AUTO: 0.7 10E3/UL (ref 0–1.3)
MONOCYTES NFR BLD AUTO: 10 %
NEUTROPHILS # BLD AUTO: 4.5 10E3/UL (ref 1.6–8.3)
NEUTROPHILS NFR BLD AUTO: 67 %
NRBC # BLD AUTO: 0 10E3/UL
NRBC BLD AUTO-RTO: 0 /100
PLATELET # BLD AUTO: 307 10E3/UL (ref 150–450)
RBC # BLD AUTO: 3.79 10E6/UL (ref 4.4–5.9)
WBC # BLD AUTO: 6.7 10E3/UL (ref 4–11)

## 2022-09-04 PROCEDURE — 80053 COMPREHEN METABOLIC PANEL: CPT | Performed by: EMERGENCY MEDICINE

## 2022-09-04 PROCEDURE — 250N000013 HC RX MED GY IP 250 OP 250 PS 637: Performed by: INTERNAL MEDICINE

## 2022-09-04 PROCEDURE — 36415 COLL VENOUS BLD VENIPUNCTURE: CPT | Performed by: EMERGENCY MEDICINE

## 2022-09-04 PROCEDURE — 250N000012 HC RX MED GY IP 250 OP 636 PS 637

## 2022-09-04 PROCEDURE — 84100 ASSAY OF PHOSPHORUS: CPT | Performed by: EMERGENCY MEDICINE

## 2022-09-04 PROCEDURE — 85018 HEMOGLOBIN: CPT | Performed by: EMERGENCY MEDICINE

## 2022-09-04 PROCEDURE — 85610 PROTHROMBIN TIME: CPT

## 2022-09-04 PROCEDURE — 83735 ASSAY OF MAGNESIUM: CPT | Performed by: EMERGENCY MEDICINE

## 2022-09-04 PROCEDURE — 86140 C-REACTIVE PROTEIN: CPT | Performed by: EMERGENCY MEDICINE

## 2022-09-04 PROCEDURE — 83880 ASSAY OF NATRIURETIC PEPTIDE: CPT | Performed by: EMERGENCY MEDICINE

## 2022-09-04 PROCEDURE — 99285 EMERGENCY DEPT VISIT HI MDM: CPT | Mod: 25 | Performed by: EMERGENCY MEDICINE

## 2022-09-04 PROCEDURE — 84484 ASSAY OF TROPONIN QUANT: CPT | Performed by: EMERGENCY MEDICINE

## 2022-09-04 PROCEDURE — 99239 HOSP IP/OBS DSCHRG MGMT >30: CPT | Mod: GC | Performed by: PHYSICAL MEDICINE & REHABILITATION

## 2022-09-04 PROCEDURE — 36415 COLL VENOUS BLD VENIPUNCTURE: CPT

## 2022-09-04 PROCEDURE — 85018 HEMOGLOBIN: CPT

## 2022-09-04 PROCEDURE — 250N000013 HC RX MED GY IP 250 OP 250 PS 637: Performed by: NURSE PRACTITIONER

## 2022-09-04 PROCEDURE — 99285 EMERGENCY DEPT VISIT HI MDM: CPT | Performed by: EMERGENCY MEDICINE

## 2022-09-04 PROCEDURE — 85610 PROTHROMBIN TIME: CPT | Performed by: EMERGENCY MEDICINE

## 2022-09-04 PROCEDURE — 250N000013 HC RX MED GY IP 250 OP 250 PS 637

## 2022-09-04 RX ORDER — PHENOL 1.4 %
10 AEROSOL, SPRAY (ML) MUCOUS MEMBRANE AT BEDTIME
Qty: 30 TABLET | Refills: 0 | Status: SHIPPED | OUTPATIENT
Start: 2022-09-04 | End: 2022-10-04

## 2022-09-04 RX ORDER — PANTOPRAZOLE SODIUM 40 MG/1
40 TABLET, DELAYED RELEASE ORAL 2 TIMES DAILY
Qty: 60 TABLET | Refills: 0 | Status: SHIPPED | OUTPATIENT
Start: 2022-09-04 | End: 2022-09-04

## 2022-09-04 RX ORDER — HYDROCORTISONE 10 MG/G
CREAM TOPICAL 3 TIMES DAILY
Qty: 28 G | Refills: 0 | Status: ON HOLD | OUTPATIENT
Start: 2022-09-04 | End: 2022-09-13

## 2022-09-04 RX ORDER — TAMSULOSIN HYDROCHLORIDE 0.4 MG/1
0.4 CAPSULE ORAL DAILY
Qty: 30 CAPSULE | Refills: 0 | Status: SHIPPED | OUTPATIENT
Start: 2022-09-04 | End: 2022-09-04

## 2022-09-04 RX ORDER — WARFARIN SODIUM 7.5 MG/1
7.5 TABLET ORAL
Status: DISCONTINUED | OUTPATIENT
Start: 2022-09-04 | End: 2022-09-04 | Stop reason: HOSPADM

## 2022-09-04 RX ORDER — PANTOPRAZOLE SODIUM 40 MG/1
40 TABLET, DELAYED RELEASE ORAL DAILY
Qty: 30 TABLET | Refills: 0 | Status: ON HOLD | OUTPATIENT
Start: 2022-09-04 | End: 2022-09-29

## 2022-09-04 RX ORDER — PROMETHAZINE HYDROCHLORIDE 12.5 MG/1
12.5 TABLET ORAL EVERY 6 HOURS PRN
Qty: 30 TABLET | Refills: 1 | Status: SHIPPED | OUTPATIENT
Start: 2022-09-04 | End: 2022-10-27

## 2022-09-04 RX ORDER — HYDROXYZINE HYDROCHLORIDE 25 MG/1
25 TABLET, FILM COATED ORAL EVERY 6 HOURS PRN
Qty: 60 TABLET | Refills: 0 | Status: SHIPPED | OUTPATIENT
Start: 2022-09-04 | End: 2022-10-07

## 2022-09-04 RX ORDER — FERROUS SULFATE 325(65) MG
325 TABLET, DELAYED RELEASE (ENTERIC COATED) ORAL
Qty: 30 TABLET | Refills: 0 | Status: SHIPPED | OUTPATIENT
Start: 2022-09-04 | End: 2022-09-04

## 2022-09-04 RX ADMIN — SALINE NASAL SPRAY 2 SPRAY: 1.5 SOLUTION NASAL at 09:29

## 2022-09-04 RX ADMIN — SERTRALINE HYDROCHLORIDE 100 MG: 100 TABLET ORAL at 09:20

## 2022-09-04 RX ADMIN — POLYETHYLENE GLYCOL 3350 17 G: 17 POWDER, FOR SOLUTION ORAL at 09:19

## 2022-09-04 RX ADMIN — HYDROXYCHLOROQUINE SULFATE 200 MG: 200 TABLET, FILM COATED ORAL at 09:20

## 2022-09-04 RX ADMIN — SENNOSIDES AND DOCUSATE SODIUM 1 TABLET: 8.6; 5 TABLET ORAL at 09:20

## 2022-09-04 RX ADMIN — HYDROCORTISONE: 10 CREAM TOPICAL at 09:30

## 2022-09-04 RX ADMIN — PANTOPRAZOLE SODIUM 40 MG: 40 TABLET, DELAYED RELEASE ORAL at 05:40

## 2022-09-04 RX ADMIN — QUETIAPINE FUMARATE 25 MG: 25 TABLET ORAL at 09:21

## 2022-09-04 RX ADMIN — DIGOXIN 125 MCG: 125 TABLET ORAL at 09:19

## 2022-09-04 RX ADMIN — MYCOPHENOLATE MOFETIL 1500 MG: 500 TABLET, FILM COATED ORAL at 09:19

## 2022-09-04 RX ADMIN — LISINOPRIL 10 MG: 10 TABLET ORAL at 09:21

## 2022-09-04 RX ADMIN — MULTIPLE VITAMINS W/ MINERALS TAB 1 TABLET: TAB at 09:20

## 2022-09-04 RX ADMIN — HYDRALAZINE HYDROCHLORIDE 50 MG: 25 TABLET ORAL at 09:20

## 2022-09-04 RX ADMIN — ASPIRIN 81 MG: 81 TABLET, CHEWABLE ORAL at 09:20

## 2022-09-04 RX ADMIN — ACETAMINOPHEN 975 MG: 325 TABLET, FILM COATED ORAL at 05:40

## 2022-09-04 ASSESSMENT — ACTIVITIES OF DAILY LIVING (ADL)
ADLS_ACUITY_SCORE: 30
ADLS_ACUITY_SCORE: 39
ADLS_ACUITY_SCORE: 30

## 2022-09-04 NOTE — PLAN OF CARE
Goal Outcome Evaluation:  Patient discharge to [facility], discharge AVS and meds reviewed with patient and his son, stable at the  time of discharge

## 2022-09-04 NOTE — PLAN OF CARE
Goal Outcome Evaluation:    Plan of Care Reviewed With: patient     Overall Patient Progress: improving    FOCUS/GOAL  Bowel management, Bladder management, Pain management, Mobility, Skin integrity, and Safety management    ASSESSMENT, INTERVENTIONS AND CONTINUING PLAN FOR GOAL:    Pt A/O x4. Transfers MOD Ind using ww. Continent of bowel and bladder, no BM this shift, no bleeding noted. Reports low back pain, utilizes prn oxycodone at HS. Regular diet/thin liquids, takes meds whole, good appetite. LVAD within paramaters. MAP 74.

## 2022-09-04 NOTE — PLAN OF CARE
Goal Outcome Evaluation:    Plan of Care Reviewed With: patient     Overall Patient Progress: no change    Outcome Evaluation: No change in pt progress on shift.    Pt A&Ox4. A1 at night. Cont of B&B. No new skin concerns. LVAD in place and WDL. MAP 89. Pt c/o back pain, scheduled tylenol managed. Pt denies SOB, headache, nausea, or new numbness/tingling. Pt slept well overnight, call light in reach, alarms on, continue POC.

## 2022-09-04 NOTE — PROGRESS NOTES
D: Received page from patient's son Nicholas that they are missing many medications after discharge.     I/A:   Nicholas states the following were not in bag from pharmacy:   Hydrocortisone  Ferrous sulfate   Multivitamin-   Thiamine   Plaquinil  Atarax   Promethazine (phenergan)   Flomax  ambien     There were also two instructions for Melatonin and they are confused about what to give.     I called ARU to clarify orders. Spoke with LITA Cornejo. She states pateint had been taking 10mg melatonin while admitted.     She will page the on-call ARU provider to order the above medications, would suggest family picks the following up OTC: Hydrocortisone, Ferrous sulfate, Multivitamin-, Thiamine    P: Returned call to Nicholas to let him know they will order those medications to [facility] on [facility] for them to , apologized for the confusion regarding medications. Advised him to call [facility] in ~ 45 minutes, if they had not received RX to page me back again.  Patient, Family notified to page on-call coordinator if symptoms worsen or with other concerns. Patient, Family verbalized understanding.

## 2022-09-05 ENCOUNTER — APPOINTMENT (OUTPATIENT)
Dept: GENERAL RADIOLOGY | Facility: CLINIC | Age: 62
DRG: 949 | End: 2022-09-05
Attending: EMERGENCY MEDICINE
Payer: COMMERCIAL

## 2022-09-05 ENCOUNTER — CARE COORDINATION (OUTPATIENT)
Dept: CARDIOLOGY | Facility: CLINIC | Age: 62
End: 2022-09-05

## 2022-09-05 VITALS
DIASTOLIC BLOOD PRESSURE: 89 MMHG | OXYGEN SATURATION: 97 % | RESPIRATION RATE: 18 BRPM | SYSTOLIC BLOOD PRESSURE: 102 MMHG | TEMPERATURE: 97.5 F | HEART RATE: 91 BPM

## 2022-09-05 PROBLEM — R42 LIGHTHEADEDNESS: Status: ACTIVE | Noted: 2022-09-05

## 2022-09-05 PROBLEM — R04.0 RECURRENT EPISTAXIS: Status: ACTIVE | Noted: 2022-09-05

## 2022-09-05 PROBLEM — R04.0 EPISTAXIS: Status: ACTIVE | Noted: 2022-09-05

## 2022-09-05 LAB
ALBUMIN SERPL BCG-MCNC: 3.8 G/DL (ref 3.5–5.2)
ALP SERPL-CCNC: 133 U/L (ref 40–129)
ALT SERPL W P-5'-P-CCNC: 15 U/L (ref 10–50)
ANION GAP SERPL CALCULATED.3IONS-SCNC: 12 MMOL/L (ref 7–15)
ANION GAP SERPL CALCULATED.3IONS-SCNC: 15 MMOL/L (ref 7–15)
AST SERPL W P-5'-P-CCNC: 42 U/L (ref 10–50)
BASOPHILS # BLD AUTO: 0 10E3/UL (ref 0–0.2)
BASOPHILS NFR BLD AUTO: 0 %
BILIRUB SERPL-MCNC: 0.4 MG/DL
BUN SERPL-MCNC: 17.6 MG/DL (ref 8–23)
BUN SERPL-MCNC: 24.2 MG/DL (ref 8–23)
CALCIUM SERPL-MCNC: 9.1 MG/DL (ref 8.8–10.2)
CALCIUM SERPL-MCNC: 9.5 MG/DL (ref 8.8–10.2)
CHLORIDE SERPL-SCNC: 105 MMOL/L (ref 98–107)
CHLORIDE SERPL-SCNC: 106 MMOL/L (ref 98–107)
CREAT SERPL-MCNC: 0.5 MG/DL (ref 0.67–1.17)
CREAT SERPL-MCNC: 0.59 MG/DL (ref 0.67–1.17)
CRP SERPL-MCNC: 13.5 MG/L
DEPRECATED HCO3 PLAS-SCNC: 16 MMOL/L (ref 22–29)
DEPRECATED HCO3 PLAS-SCNC: 18 MMOL/L (ref 22–29)
EOSINOPHIL # BLD AUTO: 0.1 10E3/UL (ref 0–0.7)
EOSINOPHIL NFR BLD AUTO: 1 %
ERYTHROCYTE [DISTWIDTH] IN BLOOD BY AUTOMATED COUNT: 17.2 % (ref 10–15)
GFR SERPL CREATININE-BSD FRML MDRD: >90 ML/MIN/1.73M2
GFR SERPL CREATININE-BSD FRML MDRD: >90 ML/MIN/1.73M2
GLUCOSE SERPL-MCNC: 112 MG/DL (ref 70–99)
GLUCOSE SERPL-MCNC: 97 MG/DL (ref 70–99)
HCT VFR BLD AUTO: 31.3 % (ref 40–53)
HGB BLD-MCNC: 9.4 G/DL (ref 13.3–17.7)
HOLD SPECIMEN: NORMAL
IMM GRANULOCYTES # BLD: 0 10E3/UL
IMM GRANULOCYTES NFR BLD: 0 %
INR PPP: 2.64 (ref 0.85–1.15)
LDH SERPL L TO P-CCNC: 223 U/L (ref 0–250)
LYMPHOCYTES # BLD AUTO: 1 10E3/UL (ref 0.8–5.3)
LYMPHOCYTES NFR BLD AUTO: 14 %
MAGNESIUM SERPL-MCNC: 1.6 MG/DL (ref 1.7–2.3)
MAGNESIUM SERPL-MCNC: 2 MG/DL (ref 1.7–2.3)
MCH RBC QN AUTO: 29.3 PG (ref 26.5–33)
MCHC RBC AUTO-ENTMCNC: 30 G/DL (ref 31.5–36.5)
MCV RBC AUTO: 98 FL (ref 78–100)
MONOCYTES # BLD AUTO: 0.5 10E3/UL (ref 0–1.3)
MONOCYTES NFR BLD AUTO: 7 %
NEUTROPHILS # BLD AUTO: 5.4 10E3/UL (ref 1.6–8.3)
NEUTROPHILS NFR BLD AUTO: 78 %
NRBC # BLD AUTO: 0 10E3/UL
NRBC BLD AUTO-RTO: 0 /100
NT-PROBNP SERPL-MCNC: 928 PG/ML (ref 0–900)
PHOSPHATE SERPL-MCNC: 3.8 MG/DL (ref 2.5–4.5)
PLATELET # BLD AUTO: 235 10E3/UL (ref 150–450)
POTASSIUM SERPL-SCNC: 4.1 MMOL/L (ref 3.4–5.3)
POTASSIUM SERPL-SCNC: 4.2 MMOL/L (ref 3.4–5.3)
PROT SERPL-MCNC: 7.7 G/DL (ref 6.4–8.3)
RBC # BLD AUTO: 3.21 10E6/UL (ref 4.4–5.9)
SODIUM SERPL-SCNC: 135 MMOL/L (ref 136–145)
SODIUM SERPL-SCNC: 137 MMOL/L (ref 136–145)
TROPONIN T SERPL HS-MCNC: 58 NG/L
TROPONIN T SERPL HS-MCNC: 61 NG/L
WBC # BLD AUTO: 7 10E3/UL (ref 4–11)

## 2022-09-05 PROCEDURE — 36415 COLL VENOUS BLD VENIPUNCTURE: CPT

## 2022-09-05 PROCEDURE — 258N000003 HC RX IP 258 OP 636

## 2022-09-05 PROCEDURE — 250N000013 HC RX MED GY IP 250 OP 250 PS 637

## 2022-09-05 PROCEDURE — 250N000009 HC RX 250: Performed by: EMERGENCY MEDICINE

## 2022-09-05 PROCEDURE — 80048 BASIC METABOLIC PNL TOTAL CA: CPT

## 2022-09-05 PROCEDURE — 83615 LACTATE (LD) (LDH) ENZYME: CPT

## 2022-09-05 PROCEDURE — 99219 PR INITIAL OBSERVATION CARE,LEVEL II: CPT | Mod: GC | Performed by: INTERNAL MEDICINE

## 2022-09-05 PROCEDURE — 96365 THER/PROPH/DIAG IV INF INIT: CPT | Performed by: EMERGENCY MEDICINE

## 2022-09-05 PROCEDURE — 96361 HYDRATE IV INFUSION ADD-ON: CPT | Performed by: EMERGENCY MEDICINE

## 2022-09-05 PROCEDURE — 84484 ASSAY OF TROPONIN QUANT: CPT | Performed by: EMERGENCY MEDICINE

## 2022-09-05 PROCEDURE — G0378 HOSPITAL OBSERVATION PER HR: HCPCS

## 2022-09-05 PROCEDURE — 999N000248 HC STATISTIC IV INSERT WITH US BY RN

## 2022-09-05 PROCEDURE — 94640 AIRWAY INHALATION TREATMENT: CPT | Performed by: EMERGENCY MEDICINE

## 2022-09-05 PROCEDURE — 85025 COMPLETE CBC W/AUTO DIFF WBC: CPT

## 2022-09-05 PROCEDURE — 250N000011 HC RX IP 250 OP 636

## 2022-09-05 PROCEDURE — 250N000011 HC RX IP 250 OP 636: Performed by: EMERGENCY MEDICINE

## 2022-09-05 PROCEDURE — 71045 X-RAY EXAM CHEST 1 VIEW: CPT | Mod: 26 | Performed by: RADIOLOGY

## 2022-09-05 PROCEDURE — 36415 COLL VENOUS BLD VENIPUNCTURE: CPT | Performed by: EMERGENCY MEDICINE

## 2022-09-05 PROCEDURE — 272N000004 HC RX 272: Performed by: EMERGENCY MEDICINE

## 2022-09-05 PROCEDURE — 71045 X-RAY EXAM CHEST 1 VIEW: CPT

## 2022-09-05 PROCEDURE — 83735 ASSAY OF MAGNESIUM: CPT

## 2022-09-05 PROCEDURE — 96366 THER/PROPH/DIAG IV INF ADDON: CPT | Performed by: EMERGENCY MEDICINE

## 2022-09-05 RX ORDER — WARFARIN SODIUM 2.5 MG/1
TABLET ORAL
Qty: 72 TABLET | Refills: 0 | Status: ON HOLD | OUTPATIENT
Start: 2022-09-05 | End: 2022-11-02

## 2022-09-05 RX ORDER — HYDRALAZINE HYDROCHLORIDE 50 MG/1
50 TABLET, FILM COATED ORAL 3 TIMES DAILY
Status: DISCONTINUED | OUTPATIENT
Start: 2022-09-05 | End: 2022-09-05 | Stop reason: HOSPADM

## 2022-09-05 RX ORDER — ATORVASTATIN CALCIUM 40 MG/1
40 TABLET, FILM COATED ORAL EVERY EVENING
Status: DISCONTINUED | OUTPATIENT
Start: 2022-09-05 | End: 2022-09-05 | Stop reason: HOSPADM

## 2022-09-05 RX ORDER — HYDROXYZINE HYDROCHLORIDE 25 MG/1
25 TABLET, FILM COATED ORAL EVERY 6 HOURS PRN
Status: DISCONTINUED | OUTPATIENT
Start: 2022-09-05 | End: 2022-09-05 | Stop reason: HOSPADM

## 2022-09-05 RX ORDER — OXYCODONE HYDROCHLORIDE 5 MG/1
5 TABLET ORAL EVERY 6 HOURS PRN
Status: DISCONTINUED | OUTPATIENT
Start: 2022-09-05 | End: 2022-09-05 | Stop reason: HOSPADM

## 2022-09-05 RX ORDER — PANTOPRAZOLE SODIUM 40 MG/1
40 TABLET, DELAYED RELEASE ORAL
Status: DISCONTINUED | OUTPATIENT
Start: 2022-09-05 | End: 2022-09-05 | Stop reason: HOSPADM

## 2022-09-05 RX ORDER — LISINOPRIL 5 MG/1
10 TABLET ORAL DAILY
Status: DISCONTINUED | OUTPATIENT
Start: 2022-09-05 | End: 2022-09-05 | Stop reason: HOSPADM

## 2022-09-05 RX ORDER — DIGOXIN 125 MCG
125 TABLET ORAL DAILY
Status: DISCONTINUED | OUTPATIENT
Start: 2022-09-05 | End: 2022-09-05 | Stop reason: HOSPADM

## 2022-09-05 RX ORDER — OXYMETAZOLINE HYDROCHLORIDE 0.05 G/100ML
2 SPRAY NASAL ONCE
Status: COMPLETED | OUTPATIENT
Start: 2022-09-05 | End: 2022-09-05

## 2022-09-05 RX ORDER — MULTIPLE VITAMINS W/ MINERALS TAB 9MG-400MCG
1 TAB ORAL DAILY
Status: DISCONTINUED | OUTPATIENT
Start: 2022-09-05 | End: 2022-09-05 | Stop reason: HOSPADM

## 2022-09-05 RX ORDER — ZOLPIDEM TARTRATE 5 MG/1
5 TABLET ORAL
Status: DISCONTINUED | OUTPATIENT
Start: 2022-09-05 | End: 2022-09-05 | Stop reason: HOSPADM

## 2022-09-05 RX ORDER — HYDRALAZINE HYDROCHLORIDE 50 MG/1
50 TABLET, FILM COATED ORAL 3 TIMES DAILY
Status: DISCONTINUED | OUTPATIENT
Start: 2022-09-05 | End: 2022-09-05

## 2022-09-05 RX ORDER — MYCOPHENOLATE MOFETIL 500 MG/1
1500 TABLET ORAL 2 TIMES DAILY
Status: DISCONTINUED | OUTPATIENT
Start: 2022-09-05 | End: 2022-09-05 | Stop reason: HOSPADM

## 2022-09-05 RX ORDER — PROMETHAZINE HYDROCHLORIDE 12.5 MG/1
12.5 TABLET ORAL EVERY 6 HOURS PRN
Status: DISCONTINUED | OUTPATIENT
Start: 2022-09-05 | End: 2022-09-05 | Stop reason: HOSPADM

## 2022-09-05 RX ORDER — ACETAMINOPHEN 325 MG/1
975 TABLET ORAL EVERY 8 HOURS
Status: DISCONTINUED | OUTPATIENT
Start: 2022-09-05 | End: 2022-09-05 | Stop reason: HOSPADM

## 2022-09-05 RX ORDER — MAGNESIUM SULFATE HEPTAHYDRATE 40 MG/ML
2 INJECTION, SOLUTION INTRAVENOUS ONCE
Status: COMPLETED | OUTPATIENT
Start: 2022-09-05 | End: 2022-09-05

## 2022-09-05 RX ORDER — ATORVASTATIN CALCIUM 40 MG/1
40 TABLET, FILM COATED ORAL EVERY EVENING
Status: DISCONTINUED | OUTPATIENT
Start: 2022-09-05 | End: 2022-09-05

## 2022-09-05 RX ORDER — HYDROXYCHLOROQUINE SULFATE 200 MG/1
200 TABLET, FILM COATED ORAL 2 TIMES DAILY
Status: DISCONTINUED | OUTPATIENT
Start: 2022-09-05 | End: 2022-09-05 | Stop reason: HOSPADM

## 2022-09-05 RX ORDER — HYDROCORTISONE 10 MG/G
CREAM TOPICAL 3 TIMES DAILY
Status: DISCONTINUED | OUTPATIENT
Start: 2022-09-05 | End: 2022-09-05 | Stop reason: HOSPADM

## 2022-09-05 RX ORDER — QUETIAPINE FUMARATE 25 MG/1
25 TABLET, FILM COATED ORAL 2 TIMES DAILY
Status: DISCONTINUED | OUTPATIENT
Start: 2022-09-05 | End: 2022-09-05 | Stop reason: HOSPADM

## 2022-09-05 RX ADMIN — PHENYLEPHRINE HYDROCHLORIDE 5 ML: 10 INJECTION INTRAVENOUS at 01:30

## 2022-09-05 RX ADMIN — SILVER NITRATE APPLICATORS: 25; 75 STICK TOPICAL at 01:20

## 2022-09-05 RX ADMIN — THROMBIN HUMAN 1 KIT: 2000 POWDER, FOR SOLUTION TOPICAL at 01:19

## 2022-09-05 RX ADMIN — HYDROCORTISONE: 10 CREAM TOPICAL at 07:59

## 2022-09-05 RX ADMIN — THIAMINE HCL TAB 100 MG 100 MG: 100 TAB at 07:58

## 2022-09-05 RX ADMIN — HYDROXYCHLOROQUINE SULFATE 200 MG: 200 TABLET, FILM COATED ORAL at 07:59

## 2022-09-05 RX ADMIN — Medication 150 MG: at 04:38

## 2022-09-05 RX ADMIN — Medication 1 EACH: at 01:18

## 2022-09-05 RX ADMIN — QUETIAPINE FUMARATE 25 MG: 25 TABLET ORAL at 07:59

## 2022-09-05 RX ADMIN — ACETAMINOPHEN 975 MG: 325 TABLET, FILM COATED ORAL at 07:58

## 2022-09-05 RX ADMIN — Medication: at 01:20

## 2022-09-05 RX ADMIN — SODIUM CHLORIDE 500 ML: 9 INJECTION, SOLUTION INTRAVENOUS at 11:25

## 2022-09-05 RX ADMIN — OXYCODONE HYDROCHLORIDE 5 MG: 5 TABLET ORAL at 03:14

## 2022-09-05 RX ADMIN — Medication 10 MG: at 03:14

## 2022-09-05 RX ADMIN — HYDRALAZINE HYDROCHLORIDE 50 MG: 50 TABLET, FILM COATED ORAL at 04:38

## 2022-09-05 RX ADMIN — FLUTICASONE FUROATE AND VILANTEROL TRIFENATATE 1 PUFF: 100; 25 POWDER RESPIRATORY (INHALATION) at 07:59

## 2022-09-05 RX ADMIN — MAGNESIUM SULFATE IN WATER 2 G: 40 INJECTION, SOLUTION INTRAVENOUS at 02:58

## 2022-09-05 RX ADMIN — SODIUM CHLORIDE 500 ML: 9 INJECTION, SOLUTION INTRAVENOUS at 03:19

## 2022-09-05 RX ADMIN — PANTOPRAZOLE SODIUM 40 MG: 40 TABLET, DELAYED RELEASE ORAL at 07:58

## 2022-09-05 RX ADMIN — DIGOXIN 125 MCG: 125 TABLET ORAL at 07:59

## 2022-09-05 RX ADMIN — ATORVASTATIN CALCIUM 40 MG: 40 TABLET, FILM COATED ORAL at 03:14

## 2022-09-05 RX ADMIN — SERTRALINE HYDROCHLORIDE 100 MG: 50 TABLET ORAL at 07:58

## 2022-09-05 RX ADMIN — LISINOPRIL 10 MG: 5 TABLET ORAL at 07:58

## 2022-09-05 RX ADMIN — OXYMETAZOLINE HYDROCHLORIDE 2 SPRAY: 0.05 SPRAY NASAL at 01:21

## 2022-09-05 RX ADMIN — Medication 1 TABLET: at 07:58

## 2022-09-05 ASSESSMENT — ENCOUNTER SYMPTOMS
ABDOMINAL PAIN: 0
NAUSEA: 0
LIGHT-HEADEDNESS: 1
PALPITATIONS: 0
DIZZINESS: 0
SHORTNESS OF BREATH: 0
DIARRHEA: 0
SINUS PAIN: 0
SINUS PRESSURE: 0
CONSTIPATION: 0
COLOR CHANGE: 0
CHILLS: 0
VOMITING: 0
BACK PAIN: 0
NECK PAIN: 0
FEVER: 0

## 2022-09-05 ASSESSMENT — ACTIVITIES OF DAILY LIVING (ADL)
ADLS_ACUITY_SCORE: 39

## 2022-09-05 NOTE — PROGRESS NOTES
Patient is noted to report some medications were missing from the discharge pharmacy. Noted these were home meds.    1. Plaquenil : Ordered  2. Atarax: not prescribed while inpatient ordered  2. Phenergan: not used while in patient. Ordered as PRN   3. Flomax: not used as in patient. Not ordered  4. Melatonin: used as inpatient ordered.  5. Ambien: Not prescribed as inpatient. Not ordered.  6. Hydrocortisone Cream given as tube to patient.    OTC meds:  Ferrous Sulfate  Multivitamin  Vit B1

## 2022-09-05 NOTE — DISCHARGE SUMMARY
[facility]   Cardiology II Service / Advanced Heart Failure  Discharge Summary     Dandy Sands MRN# [id number]   YOB: 1960 Age: 61 year old     DATE OF ADMISSION:  9/4/2022  DATE OF DISCHARGE: 9/5/2022  ADMITTING PROVIDER: Yoli Askew MD  DISCHARGE PROVIDER: Yoli Askew  PRIMARY PROVIDER: Scar Jeffrey    ADMIT DIAGNOSES:   1. Epistaxis  2. HFrEF 2/2 ICM s/p HM3 LVAD  3. CAD s/p PCI to LAD (2005)   4. S/p CRT-D (2006)   5. Mild-moderate emphysema   6. History of COVID-19   7. Deconditioning   8. Ground level fall   9. Hypomagnesemia   10. GERD   11. Dysphagia   12. Anemia due to blood loss from epistaxis   13. History of DVT/PE   14. Antiphospholipid syndrome   15. SLE     DISCHARGE DIAGNOSES:   1. Epistaxis  2. HFrEF 2/2 ICM s/p HM3 LVAD  3. CAD s/p PCI to LAD (2005)   4. S/p CRT-D (2006)   5. Mild-moderate emphysema   6. History of COVID-19   7. Deconditioning   8. Ground level fall   9. Hypomagnesemia   10. GERD   11. Dysphagia   12. Anemia due to blood loss from epistaxis   13. History of DVT/PE   14. Antiphospholipid syndrome   15. SLE     HPI: Please see the detailed H & P by Dr. Carrillo Bowles from 9/4/2022. Briefly, he was discharged from inpatient rehab yesterday morning and then developed significant epistaxis yesterday evening, prompting his presentation to the ED.     PHYSICAL EXAM:  Blood pressure 102/89, pulse 91, temperature 97.5  F (36.4  C), temperature source Oral, resp. rate 18, SpO2 97 %.  Constitutional: awake, alert, cooperative, no apparent distress, and appears stated age  Hematologic / Lymphatic: no cervical lymphadenopathy and no supraclavicular lymphadenopathy.   Respiratory: No increased work of breathing, good air exchange, clear to auscultation bilaterally, no crackles or wheezing  Cardiovascular: LVAD hum. No JVD.  GI: No scars, normal bowel sounds, soft, non-distended, non-tender, no masses palpated, no hepatosplenomegally, LVAD  driveline without subcutaneous air, no purulent discharge  Neurologic: Awake, alert, oriented to name, place and time.  No focal deficit.     LABS:   Last CBC:   Recent Labs   Lab Test 09/05/22  0617   WBC 7.0   RBC 3.21*   HGB 9.4*   HCT 31.3*   MCV 98   MCH 29.3   MCHC 30.0*   RDW 17.2*          Last CMP:  Recent Labs   Lab Test 09/05/22  0617 09/04/22  2234   * 137   POTASSIUM 4.1 4.2   CHLORIDE 105 106   ELDA 9.1 9.5   CO2 18* 16*   BUN 24.2* 17.6   CR 0.50* 0.59*   GLC 97 112*   AST  --  42   ALT  --  15   BILITOTAL  --  0.4   ALBUMIN  --  3.8   PROTTOTAL  --  7.7   ALKPHOS  --  133*       IMAGING:  Results for orders placed or performed during the hospital encounter of 09/04/22   XR Chest Port 1 View    Narrative    EXAM: XR CHEST PORT 1 VIEW  LOCATION: [facility]  DATE/TIME: 9/5/2022 12:22 AM    INDICATION: LVAD malfunction.  COMPARISON: 08/12/2022.      Impression    IMPRESSION: A left ventricular assist device appears in unchanged position.    No acute airspace consolidation. Unchanged elevation of the right hemidiaphragm. No pleural effusion or pneumothorax.    Postsurgical changes of median sternotomy. Coronary artery stents are present. Atheromatous calcifications of the thoracic aorta. Left chest wall pacer/defibrillator is present.    Postsurgical changes overlying the region of the right scapula.       PROCEDURES:   -rigid nasal endoscopy with nasal packing placement by ENT on 9/4/22     CONSULTATIONS:   -ENT     HOSPITAL COURSE BY PROBLEM:   #Epistaxis   -Recurrent epistaxis, this time stable without hemorrhage or airway compromise. ENT packed L nare in the ED. They will arrange follow up in ENT clinic. All packing is dissolvable without antibiotic need. Starting nasal saline on Tuesday and given precautions.   -stopped aspirin (indefinitely) given recurrent severe epistaxis   -decreased warfarin to 5 mg daily at bedtime (from 5 mg M/W/F and  7.5 mg T/Th/Sa/Su), follow up with anticoag clinic to titrate as outpatient     #HFrEF 2/2 ICM s/p HM3 LVAD   - interrogation showed 2 low flow alarms in the past 24 hrs before epistaxis (has had at least 6 low flow alarms over the past 5 days, has hx of hemorrhoidal bleeding x1 and soft stools). Gave him 500 ml fluids and he felt better walking around the ED. Encouraged him to liberalize his volume intake over the next few days as he is slightly dry on exam still.   - Volume status: Euvolemic              > Diuretics: Not needed at this time  - Afterload reduction: MAP goal 65-80; Hydralazine 50 TID + Lisinopril 10 qday  - Statin: Atorvastatin 40mg  - BB: Holding in setting of recent cardiogenic shock and orthostatic symptoms, consider restarting outpatient  - AA: Holding in setting of recent cardiogenic shock and orthostatic symptoms, consider restarting outpatient  - SGLTi: As outpatient, has been held due to immunosuppression he is on for his SLE.  - Anticoagulation: resume warfarin at 5 mg daily (INR goal 2-3 for now)   - Antiplatelets: discontinue ASA 81mg qday indefinitely due to recurrent nosebleeds   - continue digoxin 125 mcg daily     #APS   #SLE  - continue home plaquenil, warfarin, mycophenolate       PENDING RESULTS:     DISCHARGE MEDICATIONS:  Current Discharge Medication List      CONTINUE these medications which have CHANGED    Details   warfarin ANTICOAGULANT (COUMADIN) 2.5 MG tablet Take 2 tabs (5mg) daily at bedtime. Future dose adjustments pending INR  Qty: 72 tablet, Refills: 0    Associated Diagnoses: LVAD (left ventricular assist device) present (H)         CONTINUE these medications which have NOT CHANGED    Details   acetaminophen (TYLENOL) 325 MG tablet Take 3 tablets (975 mg) by mouth every 8 hours for 30 days  Qty: 270 tablet, Refills: 0    Associated Diagnoses: LVAD (left ventricular assist device) present (H)      atorvastatin (LIPITOR) 40 MG tablet Take 1 tablet (40 mg) by mouth every  evening for 30 days  Qty: 30 tablet, Refills: 0    Associated Diagnoses: Decompensated heart failure (H)      digoxin (LANOXIN) 125 MCG tablet Take 1 tablet (125 mcg) by mouth daily for 30 days  Qty: 30 tablet, Refills: 0    Associated Diagnoses: Heart failure with reduced ejection fraction, NYHA class III (H)      fluticasone-vilanterol (BREO ELLIPTA) 100-25 MCG/INH inhaler Inhale 1 puff into the lungs daily for 30 days  Qty: 30 each, Refills: 0    Associated Diagnoses: Nasal septal perforation      hydrALAZINE (APRESOLINE) 50 MG tablet Take 1 tablet (50 mg) by mouth 3 times daily for 30 days  Qty: 90 tablet, Refills: 0    Associated Diagnoses: LVAD (left ventricular assist device) present (H)      hydrocortisone 1 % CREA cream Place rectally 3 times daily for 2 days  Qty: 28 g, Refills: 0    Associated Diagnoses: Heart failure with reduced ejection fraction, NYHA class III (H); LVAD (left ventricular assist device) present (H); External hemorrhoids      hydroxychloroquine (PLAQUENIL) 200 MG tablet Take 200 mg by mouth 2 times daily      hydrOXYzine (ATARAX) 25 MG tablet Take 1 tablet (25 mg) by mouth every 6 hours as needed for anxiety (ANXIETY)  Qty: 60 tablet, Refills: 0    Associated Diagnoses: Anxiety; Nausea      lisinopril (ZESTRIL) 10 MG tablet Take 1 tablet (10 mg) by mouth daily for 30 days  Qty: 30 tablet, Refills: 0    Associated Diagnoses: LVAD (left ventricular assist device) present (H)      !! Melatonin 10 MG TABS tablet Take 1 tablet (10 mg) by mouth At Bedtime for 30 days  Qty: 30 tablet, Refills: 0    Associated Diagnoses: Adjustment insomnia      !! melatonin 3 MG tablet Take 6 mg by mouth nightly as needed for sleep      multivitamin w/minerals (THERA-VIT-M) tablet Take 1 tablet by mouth daily  Refills: 0    Associated Diagnoses: Heart failure with reduced ejection fraction, NYHA class III (H)      mycophenolate (GENERIC EQUIVALENT) 500 MG tablet Take 3 tablets (1,500 mg) by mouth 2 times daily  for 30 days  Qty: 180 tablet, Refills: 0    Associated Diagnoses: LVAD (left ventricular assist device) present (H)      pantoprazole (PROTONIX) 40 MG EC tablet Take 1 tablet (40 mg) by mouth daily  Qty: 30 tablet, Refills: 0    Associated Diagnoses: LVAD (left ventricular assist device) present (H)      pramox-pe-glycerin-petrolatum (PREPARATION H) 1-0.25-14.4-15 % CREA cream Place rectally 2 times daily as needed for hemorrhoids Apply immediately after a bowel movement.  Qty: 51 g, Refills: 0    Associated Diagnoses: Heart failure with reduced ejection fraction, NYHA class III (H); LVAD (left ventricular assist device) present (H); External hemorrhoids      promethazine (PHENERGAN) 12.5 MG tablet Take 1 tablet (12.5 mg) by mouth every 6 hours as needed for nausea or vomiting  Qty: 30 tablet, Refills: 1    Associated Diagnoses: Anxiety      !! QUEtiapine (SEROQUEL) 25 MG tablet Take 1 tablet (25 mg) by mouth 2 times daily for 30 days  Qty: 60 tablet, Refills: 0    Associated Diagnoses: LVAD (left ventricular assist device) present (H)      !! QUEtiapine (SEROQUEL) 50 MG tablet Take 3 tablets (150 mg) by mouth At Bedtime for 30 days  Qty: 90 tablet, Refills: 0    Associated Diagnoses: LVAD (left ventricular assist device) present (H)      sertraline (ZOLOFT) 100 MG tablet Take 1 tablet (100 mg) by mouth daily for 30 days  Qty: 30 tablet, Refills: 0    Associated Diagnoses: Anxiety      thiamine (B-1) 100 MG tablet Take 1 tablet (100 mg) by mouth daily  Qty: 30 tablet, Refills: 0    Associated Diagnoses: Acute on chronic systolic heart failure (H)      zolpidem (AMBIEN) 5 MG tablet Take 5 mg by mouth nightly as needed for sleep       !! - Potential duplicate medications found. Please discuss with provider.      STOP taking these medications       aspirin (ASA) 81 MG chewable tablet Comments:   Reason for Stopping:         oxyCODONE (ROXICODONE) 5 MG tablet Comments:   Reason for Stopping:         saline nasal (AYR  SALINE) GEL topical gel Comments:   Reason for Stopping:         sodium chloride (OCEAN) 0.65 % nasal spray Comments:   Reason for Stopping:               DISCHARGE DISPOSITION: Dandy Sands will discharge to home in stable condition.     DISCHARGE INSTRUCTIONS:  Discharge Procedure Orders   Adult ENT  Referral   Standing Status: Future   Referral Priority: Urgent: 3-5 Days Referral Type: Consultation   Requested Specialty: Otolaryngology   Number of Visits Requested: 1     Reason for your hospital stay   Order Comments: nosebleed     Activity   Order Comments: Your activity upon discharge: activity as tolerated     Order Specific Question Answer Comments   Is discharge order? Yes      Adult [facility] Follow-up and recommended labs and tests   Order Comments: Follow up with primary care provider, Scar Jeffrey, within 7 days to evaluate medication change.  No follow up labs or test are needed.      Appointments on [place] and/or [facility] (with [facility] or [facility] provider or service). Call [phone] if you haven't heard regarding these appointments within 7 days of discharge.     Diet   Order Comments: Follow this diet upon discharge: Orders Placed This Encounter      Fluid restriction 2000 ML FLUID      2 Gram Sodium Diet     Order Specific Question Answer Comments   Is discharge order? Yes        60 minutes spent in discharge, including >50% in counseling and coordination of care, medication review and plan of care recommended on follow up. Questions were answered.   Scar Mackey  (PCP) was contacted at the time of discharge, so as to bridge from hospital to outpatient care.     It was our pleasure to care for Dandy Sands during this hospitalization. Please do not hesitate to contact me should there be questions regarding the hospital course or discharge plan.      Ceferino Thomas MD  Cardiology Fellow  9/5/2022

## 2022-09-05 NOTE — CONSULTS
Otolaryngology Consult Note  September 4, 2022      CC: epistaxis    HPI: Dandy Sands is a 61 year old male with a past medical history of CAD s/p PCI to LAD, MI, ICM, heart failure, antiphospholipid antibody syndrome on warfarin, SLE, HTN, HLD and recurrent epistaxis. He has had his nose packed for epistaxis about every 2-4 weeks for the past 3 months. He was previously noted to have an anterior septal perforation likely related to a history of nasal trauma. He does note that he has previously had his nose cauterized which has helped the issue in the past, but it has been a while since he had this done.    He is seen in the ED tonight for epistaxis ongoing for the past 3 hours. He says it began about 1 hour after he took his bloodthinner this evening. It is primarily out of his left nostril but he notes that when his left nostril is full, then it starts coming out the right. He also feels it going down the back of his throat and has coughed up a lot of clots. Since his last episode, he has been using nasal saline and ayr nasal saline gel regularly to try to prevent recurrence.    PMH:  Past Medical History:   Diagnosis Date     Antiphospholipid antibody syndrome (H)      CAD (coronary artery disease)      Chronic systolic heart failure (H)      Ischemic cardiomyopathy      Mitral regurgitation      Systemic lupus erythematosus (H)       No Known Allergies    Past Surgical Hx:  Past Surgical History:   Procedure Laterality Date     COLONOSCOPY N/A 05/23/2022    Procedure: COLONOSCOPY;  Surgeon: Parth Meneses MD;  Location: [facility] OR     CV CORONARY ANGIOGRAM N/A 05/24/2022    Procedure: Coronary Angiogram;  Surgeon: Mike Pope MD;  Location:  [facility] CARDIAC CATH LAB     CV CORONARY ANGIOGRAM N/A 05/29/2022    Procedure: Coronary Angiogram;  Surgeon: Austin Bright MD;  Location:  HEART CARDIAC CATH LAB     CV CORONARY LITHOTRIPSY PCI Bilateral 05/24/2022    Procedure: Percutaneous Coronary  Intervention - Lithotripsy;  Surgeon: Mike Pope MD;  Location: [facility]     CV INTRA AORTIC BALLOON N/A 06/17/2022    Procedure: Intraprocedure Aortic Balloon Pump Insertion;  Surgeon: Sherman Cerda MD;  [facility]     CV INTRA AORTIC BALLOON Right 07/03/2022    Procedure: Reposition of Subclavian Intraprocedure Aortic Balloon Pump;  Surgeon: Austin Bright MD;  [facility]     CV INTRAVASULAR ULTRASOUND N/A 05/24/2022    Procedure: Intravascular Ultrasound;  Surgeon: Mike Pope MD;  Location: [facility]     CV PCI ANGIOPLASTY N/A 05/29/2022    Procedure: Percutaneous Transluminal Angioplasty;  Surgeon: Austin Bright MD;  [facility]: [facility]     CV PCI STENT DRUG ELUTING N/A 05/24/2022    Procedure: Percutaneous Coronary Intervention Stent;  Surgeon: Mike Pope MD;  Location: [facility]     CV RIGHT HEART CATH MEASUREMENTS RECORDED N/A 05/18/2022    Procedure: Right Heart Catheterization;  Surgeon: Justo Stewart MD;  [facility]: [facility]     CV RIGHT HEART CATH MEASUREMENTS RECORDED N/A 05/24/2022    Procedure: Right Heart Catheterization;  Surgeon: Mike Pope MD;  [facility]     CV RIGHT HEART CATH MEASUREMENTS RECORDED N/A 05/29/2022    Procedure: Right Heart Catheterization;  Surgeon: Austin Bright MD;  [facility]: [facility]     CV RIGHT HEART CATH MEASUREMENTS RECORDED N/A 07/01/2022    Procedure: Right Heart Catheterization;  Surgeon: Mike Pope MD;  [facility]     CV RIGHT HEART EXERCISE STRESS STUDY N/A 05/18/2022    Procedure: Stress Drug Study;  Surgeon: Justo Stewart MD;  [facility]     CV SWAN CHOCO PROCEDURE N/A 06/17/2022    Procedure: Salter Path Choco Procedure;  Surgeon: Sherman Cerda MD;  [facility]      INCISION AND DRAINAGE CHEST WASHOUT, COMBINED N/A 07/11/2022    Procedure: Chest washout and closure;  Surgeon: Darnell Morgan MD;  [facility]: [facility]     INCISION AND DRAINAGE CHEST WASHOUT, COMBINED N/A 07/12/2022    Procedure: INCISION AND DRAINAGE, WOUND, CHEST, WITH IRRIGATION;  Surgeon: Darius Zamora MD;  Location: [facility] OR     INSERT ARTERIAL LINE  07/18/2022          INSERT INTRAAORTIC BALLOON PUMP Right 06/23/2022    Procedure: INSERTION, INTRA-AORTIC BALLOON PUMP RIGHT SUBCLAVIAN ARTERY.;  Surgeon: Hayden Veras MD;  Location: [facility] OR     INSERT VENTRICULAR ASSIST DEVICE LEFT (HEARTMATE II/III) MINIMALLY INVASIVE N/A 07/08/2022    Procedure: MEDIAN STERNOTOMY.  CARDIOPULMONARY BYPASS.  INTRAOPERATIVE TRANSESOPHAGEAL ECHOCARDIOGRAM.  IMPLANT LEFT VENTRICULAR ASSIST DEVICE HEARTMATE III.  PLACEMENT OF PERCUTANEOUS RIGHT VENTRICULAR ASSIST DEVICE.  TEMPORARY CHEST CLOSURE;  Surgeon: Darius Zamora MD;  Location: [facility]     IR CHEST TUBE PLACEMENT NON-TUNNELED RIGHT  08/01/2022     IRRIGATION AND DEBRIDEMENT CHEST WASHOUT, COMBINED N/A 07/13/2022    Procedure: IRRIGATION AND DEBRIDEMENT, CHEST;  Surgeon: Darius Zamora MD;  Location: [facility]     PICC DOUBLE LUMEN PLACEMENT Right 05/28/2022    right basilic 5 fr dl picc 40 cm     PICC DOUBLE LUMEN PLACEMENT Right 08/15/2022    Right Lateral, 41 total length, 3 cm external length     Social History: noncontributory    Family History: noncontributory    ROS: 12 point review of systems is negative unless noted in HPI.    PHYSICAL EXAM:  General: laying in bed, no acute distress  Pulse 96   Temp 97.5  F (36.4  C) (Oral)   Resp 16   SpO2 92%   HEAD: normocephalic, atraumatic  Face: symmetrical, CN VII intact bilaterally (HB 1), no swelling, edema, or erythema.   Nose: bloody drainage from the left nare.  Neck: no LAD, trachea midline  Respiratory: breathing non-labored on RA, no stridor  Psych: pleasant affect    RIGID NASAL ENDOSCOPY:  Due  to epistaxis, rigid endoscopy was indicated. After obtaining verbal consent, the nose was topically decongested and anesthetized. The scope was passed into the RIGHT nostril. Clot was suctioned out. No bleeding region was noted, and no blood was pooling on the nasal floor. The scope was passed into the LEFT nare. Clot was suctioned out of the nasal cavity and blood was noted pooling along the nasal floor. His anterior septal perforation was again appreciated, however the bleed did not appear to be originating there this time. No discreet bleeding region was able to be identified.    PROCEDURE - nasal packing placement  Initially, surgicel wrapped surgifoam was placed into the left nare however the bleeding continued. The first round of packing was removed and significant clot had gathered behind it, suggesting that the bleed was posterior in the left nasal cavity. Next, fibrilar was placed into the posterior portion of the left nasal cavity, and surgifoam was placed in front. The rest of the left nasal cavity was then filled with surgiflo. The patient was observed for 15 minutes and gargled with cold water twice to ensure no further bleeding.    ROUTINE IP LABS (Last four results)  BMPRecent Labs   Lab 09/01/22  0606 08/29/22  0804    140   POTASSIUM 3.9 3.9   CHLORIDE 112* 108   ELDA 9.4 9.6   CO2 22 24   BUN 13 14   CR 0.56* 0.69   GLC 94 104*     CBC  Recent Labs   Lab 09/04/22  0601 09/03/22  2224 09/03/22  1348 09/03/22  0607 09/02/22  2200 09/02/22  1455 09/01/22  0606 08/29/22  0804   WBC  --   --   --   --   --  7.5 5.5 6.8   RBC  --   --   --   --   --  3.74* 3.34* 3.73*   HGB 11.1* 10.0* 10.6* 10.4*   < > 11.0* 9.9* 10.9*   HCT  --   --   --   --   --  35.0* 31.4* 34.8*   MCV  --   --   --   --   --  94 94 93   MCH  --   --   --   --   --  29.4 29.6 29.2   MCHC  --   --   --   --   --  31.4* 31.5 31.3*   RDW  --   --   --   --   --  17.0* 17.1* 17.0*   PLT  --   --   --   --   --  312 080 441    < > =  values in this interval not displayed.     INR  Recent Labs   Lab 09/04/22  0601 09/03/22  0607 09/02/22  0606 09/01/22  0606   INR 2.69* 2.64* 2.56* 2.25*       Assessment and Plan  Dandy Sands is a 61 year old male with a past medical history of CAD s/p PCI to LAD, MI, ICM, acute on chronic heart failure, s/p CRT-D, severe MR, antiphospholipid antibody syndrome on chronic warfarin who was last seen by ENT 4 weeks ago for recurrent epistaxis. Seen in consultation for epistaxis recurrence, with bleeding likely in the posterior LEFT nasal cavity, now s/p placement of absorbable packing. Given the recurrence of his issues, we will plan to see him in clinic to determine if there are more definitive options to address his epistaxis.    - followup with Dr. Erica Vazquez (we will message clinic, and an ENT referral has also been placed)  - all packing is dissolvable; no need for antibiotics  - start nasal saline on Tuesday  - sinus precautions; no nose blowing, no sneezing with mouth shut  - if bleeding recurs, first spray afrin and apply pressure.      Sunitha Carpenter MD   Otolaryngology-Head & Neck Surgery PGY2  Please contact ENT on call with questions

## 2022-09-05 NOTE — PROGRESS NOTES
D: Called to follow up after hospital discharge today.     I/A: Spoke with son Nicholas who has no questions/ concerns at this time. Reviewed medications changed/stopped at discharge.     P: Patient will be seen in clinic tomorrow, reviewed scheduled with son.  Caregiver notified to page on-call coordinator if symptoms worsen or with other concerns. Caregiver verbalized understanding.

## 2022-09-05 NOTE — ED PROVIDER NOTES
"    [facility] ([facility])  9/04/22 ED 17  History     Chief Complaint   Patient presents with     Epistaxis     HPI  Dandy Sands is a 61 year old male w/ PMH notable for CAD, ischemic cardiomyopathy s/p LVAD, antiphospholipid antibody syndrome, atrial fibrillation, SLE on immunosuppression, prior DVT on warfarin anticoagulation who presents with recurrent epistaxis.    RECENT HISTORY REVIEW:  Patient had a lengthy hospitalization from 6/17-8/21/2022 on the [facility] 2 service for cardiogenic shock complicated by multiorgan failure requiring mechanical support with a balloon pump.  At that time he had presented to [facility] reporting nausea, vomiting, abdominal pain.  He was found to be in multiorgan failure.  In prior notes they describe a nasal septal perforation with some elective procedure in [place] that was postponed for the LVAD, unclear what this is despite CareEverywhere check.  He had HeartMate 3 LVAD placement on 7/8/2022 by Dr. Zamora.  He had had a temporary RVAD that was removed during the hospitalization complicated by RV thrombus.  He did have issues with epistaxis during that hospitalization, and it was not fixed by packing overnight.  He continues to have bleeding and so ENT was consulted, they performed cautery and packed.  He has a prior history of nasal perforation and encouraged him to follow-up with his ENT specialist in [place] for evaluation of his nasal perforation.  He was discharged to acute rehab unit where he was from 8/21-9/4/2022.     CURRENT PRESENTATION:  Patient presents today with family out of concern for recurrent nosebleed.  They report that he had \"lost a lot of blood\" at home and that he was trying to manage it with Kleenexes, and unable to do so, returned here for further evaluation and management. Patient reports that earlier today he felt lightheaded briefly (no vertiginous type symptoms, no syncope/near syncope, no traumas " or falls) and that that resolved on its own and he is currently feeling well.  He reportedly did have a few seconds of low flow alarm x2 today.  He reports that he does get a low flow alarm probably twice a day every day since having his LVAD placed.  Denies any chest pain, no abnormal palpitations, shortness of breath, cough.  No fevers or chills.  No trauma to the head.  Other than the nosebleed no other new HEENT symptoms.  No changes to bowel or bladder.  No new extremity, neck or back symptoms.  No rashes or skin changes.  No other new symptoms or complaints this time.  He had nonbloody loose stools today, no mucus. Does not feel dehydrated.  In epic reviews, he has a prior episodes of diarrhea and he reports this during his inpatient stay on 8/3/2022.  He reported having episodes of diarrhea in association with drinking Ensure.  Per discharge summary 9/4/2022, he is still on Ensure at this time.  Please see ROS for further details.        This part of the document was transcribed by Iram Holland, Medical Scribe.     I have reviewed the Medications, Allergies, Past Medical and Surgical History, and Social History in the [employer] system.  Past Medical History:   Diagnosis Date     Antiphospholipid antibody syndrome (H)      CAD (coronary artery disease)      Chronic systolic heart failure (H)      Ischemic cardiomyopathy      Mitral regurgitation      Systemic lupus erythematosus (H)        Past Surgical History:   Procedure Laterality Date     COLONOSCOPY N/A 05/23/2022    Procedure: COLONOSCOPY;  Surgeon: Parth Meneses MD;  [facility]: [facility]     CV CORONARY ANGIOGRAM N/A 05/24/2022    Procedure: Coronary Angiogram;  Surgeon: Mike Pope MD;  Location:  [facility] CARDIAC CATH LAB     CV CORONARY ANGIOGRAM N/A 05/29/2022    Procedure: Coronary Angiogram;  Surgeon: Austin Bright MD;  Location:  HE[facility] CARDIAC CATH LAB     CV CORONARY LITHOTRIPSY PCI Bilateral 05/24/2022    Procedure: Percutaneous  Coronary Intervention - Lithotripsy;  Surgeon: Mike Pope MD;  [facility]     CV INTRA AORTIC BALLOON N/A 06/17/2022    Procedure: Intraprocedure Aortic Balloon Pump Insertion;  Surgeon: Sherman Cerda MD;  [facility]     CV INTRA AORTIC BALLOON Right 07/03/2022    Procedure: Reposition of Subclavian Intraprocedure Aortic Balloon Pump;  Surgeon: Austin Bright MD;  Location:  HEART CARDIAC CATH LAB     CV INTRAVASULAR ULTRASOUND N/A 05/24/2022    Procedure: Intravascular Ultrasound;  Surgeon: Mike Pope MD;  [facility]     CV PCI ANGIOPLASTY N/A 05/29/2022    Procedure: Percutaneous Transluminal Angioplasty;  Surgeon: Austin Bright MD;  [facility]     CV PCI STENT DRUG ELUTING N/A 05/24/2022    Procedure: Percutaneous Coronary Intervention Stent;  Surgeon: Mike Pope MD;  [facility]     CV RIGHT HEART CATH MEASUREMENTS RECORDED N/A 05/18/2022    Procedure: Right Heart Catheterization;  Surgeon: Justo Stewart MD;  [facility]     CV RIGHT HEART CATH MEASUREMENTS RECORDED N/A 05/24/2022    Procedure: Right Heart Catheterization;  Surgeon: Mike Pope MD;  [facility]     CV RIGHT HEART CATH MEASUREMENTS RECORDED N/A 05/29/2022    Procedure: Right Heart Catheterization;  Surgeon: Austin Bright MD;  [facility]     CV RIGHT HEART CATH MEASUREMENTS RECORDED N/A 07/01/2022    Procedure: Right Heart Catheterization;  Surgeon: Mike Pope MD;  [facility]     CV RIGHT HEART EXERCISE STRESS STUDY N/A 05/18/2022    Procedure: Stress Drug Study;  Surgeon: Justo Stewart MD;  Location:  HEART CARDIAC CATH LAB     CV SWAN CHOCO PROCEDURE N/A 06/17/2022    Procedure: [place] Procedure;  Surgeon: Sherman Cerda MD;  [facility]: [facility] C[facility]  CATH LAB     INCISION AND DRAINAGE CHEST WASHOUT, COMBINED N/A 07/11/2022    Procedure: Chest washout and closure;  Surgeon: Darnell Morgan MD;  [facility]     INCISION AND DRAINAGE CHEST WASHOUT, COMBINED N/A 07/12/2022    Procedure: INCISION AND DRAINAGE, WOUND, CHEST, WITH IRRIGATION;  Surgeon: Darius Zamora MD;  Location: [facility] OR     INSERT ARTERIAL LINE  07/18/2022          INSERT INTRAAORTIC BALLOON PUMP Right 06/23/2022    Procedure: INSERTION, INTRA-AORTIC BALLOON PUMP RIGHT SUBCLAVIAN ARTERY.;  Surgeon: Hayden Veras MD;  Location: [facility] OR     INSERT VENTRICULAR ASSIST DEVICE LEFT (HEARTMATE II/III) MINIMALLY INVASIVE N/A 07/08/2022    Procedure: MEDIAN STERNOTOMY.  CARDIOPULMONARY BYPASS.  INTRAOPERATIVE TRANSESOPHAGEAL ECHOCARDIOGRAM.  IMPLANT LEFT VENTRICULAR ASSIST DEVICE HEARTMATE III.  PLACEMENT OF PERCUTANEOUS RIGHT VENTRICULAR ASSIST DEVICE.  TEMPORARY CHEST CLOSURE;  Surgeon: Darius Zamora MD;  Location: [facility]     IR CHEST TUBE PLACEMENT NON-TUNNELED RIGHT  08/01/2022     IRRIGATION AND DEBRIDEMENT CHEST WASHOUT, COMBINED N/A 07/13/2022    Procedure: IRRIGATION AND DEBRIDEMENT, CHEST;  Surgeon: Darius Zamora MD;  Location: [facility] OR     PICC DOUBLE LUMEN PLACEMENT Right 05/28/2022    right basilic 5 fr dl picc 40 cm     PICC DOUBLE LUMEN PLACEMENT Right 08/15/2022    Right Lateral, 41 total length, 3 cm external length     Past medical history, past surgical history, medications, and allergies were reviewed with the patient. Additional pertinent items: None    No family history on file.    Social History     Tobacco Use     Smoking status: Not on file     Smokeless tobacco: Not on file   Substance Use Topics     Alcohol use: Not on file      Social history was reviewed with the patient. Additional pertinent items: None    Review of Systems   Constitutional: Negative for chills and fever.   HENT: Positive for nosebleeds. Negative for congestion, sinus pressure, sinus  pain, sneezing, sore throat, trouble swallowing and voice change.    Respiratory: Negative for shortness of breath.    Cardiovascular: Negative for chest pain and palpitations.        Low flow alarm x 2 today   Gastrointestinal: Negative for abdominal pain, constipation, diarrhea, nausea and vomiting.   Musculoskeletal: Negative for back pain and neck pain.   Skin: Negative for color change and rash.   Neurological: Positive for light-headedness (earlier, briefly, resolved). Negative for dizziness, syncope, weakness and headaches.   Hematological: Bruises/bleeds easily (anticoagulated in setting of LVAD).   All other systems reviewed and are negative.    A complete review of systems was performed with pertinent positives and negatives noted in the HPI, and all other systems negative.    Physical Exam   Pulse: 96  Temp: 97.5  F (36.4  C)  Resp: 16  SpO2: 92 %      CONSTITUTIONAL: Well-developed and well-nourished. Awake and alert. Non-toxic appearance. No acute distress. Arrives w/ bloody tissues at nose, but protecting airway currently.   HENT:   - Head: Normocephalic and atraumatic.   - Ears: Hearing and external ear grossly normal.   - Nose: Nose normal. No rhinorrhea. Arrived w/ epistaxis. Not profuse. Protecting airway.  - Mouth/Throat: MMM Protecting airway  EYES: Conjunctivae and lids are normal. No scleral icterus.   NECK: Normal range of motion and phonation normal. Neck supple.  No tracheal deviation, no stridor. No edema or erythema noted.  CARDIOVASCULAR: Abnormal auscultation (LVAD whir). Extremities warm and seemingly well perfused  PULMONARY/CHEST: Normal work of breathing. No accessory muscle usage or stridor. No respiratory distress.  No appreciable abnormal breath sounds.  ABDOMEN: Soft, non-distended. No tenderness. No rigidity, rebound or guarding.   MUSCULOSKELETAL: Extremities warm and seemingly well perfused. No edema or calf tenderness.  NEUROLOGIC: Awake, alert. Not disoriented. He displays  "no atrophy and no tremor. Normal tone. No seizure activity. GCS 15. Mentating appropriately. No clear acute neuro abnormalities appreciated.   SKIN: Skin is warm and dry. No rash noted. No diaphoresis. No pallor.   PSYCHIATRIC: Normal mood and affect. Speech and behavior normal. Thought processes linear. Cognition and memory are normal.      ED Course        LVAD Readings  Flow 3.1  Speed 5200  PI 7.4-8.6  Power 3.7 (patient reports PI is highly variable depending on position for the patient, consistent with previous.      Assessments & Plan (with Medical Decision Making)     IMPRESSION: Dandy Sands is a 61 year old male w/ PMH notable for CAD, ischemic cardiomyopathy s/p LVAD, antiphospholipid antibody syndrome, SLE on immunosuppression, warfarin anticoagulation who presents with recurrent epistaxis.    Clinically, patient appears nontoxic, NAD, does have a bloody tissue at the nose, but no profuse bleeding and appears to be protecting his own airway.  Otherwise has physical exam findings consistent with LVAD being in place.  Has had a complicated ENT/nasal history as described above and in EMR, no new trauma with this current spontaneous bleed.  Symptoms earlier may be related, could have been incidental or related to LVAD (and anticoagulation), amongst others.  I will evaluate with labs for any supratherapeutic INR, as well as evaluate the LVAD while working with ENT for assistance given his complex past medical history.    PLAN: ENT Consult, screening labs, stool studies given had diarrhea   - Risks/benefits of pursuing imaging review and accepted.     RESULTS:  - Labs: INR 2.64  --- Troponin 58,  (down from June), creatinine 0.59, GFR greater than 90, CRP 13.50   - Imaging: Written preliminary reports reviewed:  --- CXR: \"A left ventricular assist device appears in unchanged position.   No acute airspace consolidation. Unchanged elevation of the right hemidiaphragm. No pleural effusion or pneumothorax. " Postsurgical changes of median sternotomy. Coronary artery stents are present. Atheromatous calcifications of the thoracic aorta. Left chest wall pacer/defibrillator is present. Postsurgical changes overlying the region of the right scapula.  --- Results/reports reviewed w/ patient who expresses understanding of findings and F/U recommendations.    INTERVENTIONS:   - ENT consult / Epistaxis management - Please see their note for details  - Discussion w/ Cardiology    RE-EVALUATION:  - Epistaxis hemostasis was able to be achieved by ENT  - Pt otherwise continues to do well here in the ED, no acute issues or apparent concerning changes in vitals or clinical appearance.    DISCUSSIONS:  - w/ Dr. Askew (Cards 2 attending): Reviewed patient/presentation, current state of workup/any pending studies. They will admit for observation and further evaluation/management, F/U pending studies as needed, coordinate w/ consulting services as needed. No additional requests/recommendations for workup/management for in the ED at this time.   - w/ Patient: I have reviewed the available findings, plan with the patient and his family/guest. They expressed understanding and agreement with this plan. All questions answered to the best of our ability at this time.     DISPOSITION/PLANNING:  - IMPRESSION: Recurrent epistaxis  - DISPOSITION: Admit to Cards 2 for obs, further evaluation/management  - FOLLOW-UP: w/ ENT after able to be discharged and as further recommended by Cardiology  - OTHER RECOMMENDATIONS:   -- Epistaxis cares per ENT w/ ENT F/U w/ Dr. Vazquez (Cardiology will be sure to place order and that patient has this info with discharge instructions)      ______________________________________________________________________         YEVGENIY PHILIPPE MD    9/4/2022   [facility] EMERGENCY DEPARTMENT     Yevgeniy Philippe MD  09/06/22 0125

## 2022-09-05 NOTE — ED TRIAGE NOTES
Pt ambulatory to triage with son for a nose bleed. Patient reports the nose bleed started around 9pm after taking his blood thinner. Patient has a Heartmate 3 LVAD. Patient discharged from acute rehab earlier today.

## 2022-09-05 NOTE — PROGRESS NOTES
Pt discharged to: home  Via: car with son    Prescriptions sent to patients preferred pharmacy. Appropriate LDA's removed from pt, telemetry discontinued. All belonging sent with patient and patient verifies taking all belongings with them.   Patient exhibits understanding of discharge instruction and all questions and/or concerns were answered.     Verna Wiley RN

## 2022-09-05 NOTE — ED NOTES
Patient did not press the call light and attempted to urinate in the urinal bedside. Patient said his feet slipped on the floor and fell on his bottom. Patient needed minimal help to get back into bed. A full assessment and vitals were completed which were stable and showed no change from prior. Patient stated he did not hit his head and sustained no injuries. Cards 2 night resident was called and informed of the fall. A compass was completed and an high harm debrief and post fall form was completed.

## 2022-09-05 NOTE — PROGRESS NOTES
[facility]    Cardiology History and Physical - Cardiology    Date of Admission:  9/4/2022    Assessment & Plan: MARISOL Dorman EDUARD Sands is a 61 year old male admitted on 9/5/2022. He is a 61M with pmhx of SLE, antiphospholipid syndrome, hx of history pulmonary embolism (on anticoagulation with warfarin), HFrEF due to ICM, HLD. He initially presented to [facility] on 6/13 due to nausea, vomiting, abdominal bloating and transferred to [facility] for admission on 6/17/2022 for decompensated heart failure 2/2 cardiogenic shock c/b multiorgan failure requiring IABP, is s/p HM 3 LVAD on 7/8/2022. Hospitalization was also c/b RV, had 29F Protek duo via RIJ, RVAD removed 7/21, hemopericardium requiring repeat washout, chest was closed 7/13. He then developed epistaxis on 8/6 ENT. Patient has h/o nasal perforation that required elective procedure (in [place]) which was postponed due to LVAD insertion.  He required intubation 8/7 for airway protection, s/p extubated 8/8. Nasal pack removed 8/9. Now admitted for epistaxis rebleeding after being discharged from ARU this AM.    Changes today  - NaCl 0.9% 500c over 2h x2 given low PI events   - Holding PTA aspirin and warfarin  - Following ENT recs  - Orthostatics ordered  - Fall precautions  - IV Magnesium 2g given Mg 1.6  - PT/OT consulted     #HFrEF 2/2 ICM  in setting of cardiogenic shock (SCAI D) s/p HM3 LVAD  #RV failure s/p Protek duo temporary RVAD s/p decannulation 7/21  #RV thrombus  #Post chest closure hemopericardium s/p repeat washout (7/12)  #PMH CAD s/p PCI to LAD (2005)  #S/p CRT-D (2006)  Patient with ICM s/p HM3 LVAD 7/8/22 2/2 cardiogenic shock requiring MCS with IABP as prior to HM (initially evaluated for heart transplant, however noted to have substantially elevated DSAs during course of care, so decision made to proceed with LVAD given patient was already on temporary MCS). Intraoperative  course during LVAD placement was c/b RVF resulting in cardiogenic shock requiring high dose pressors necessitating temporary RVAD support. Initial post-op course c/b hemopericardium requiring repeat washout with chest left open, with good recovery extubation and decannulation on 7/21. Patient tolerating hemodynamics and end organ standpoint well. He has had intermittent low flow alarms with high PI, no power spike, and a closed aortic valve on TTE. TTE also showed underfilling LV which could explain his low flow alarms d/t suckdown event so his LVAD speed was reduced to 5200. Patient did have a low flow alarm on 8/20 @ 1145 (PI 8-9 and not hypotensive); CTA chest done which showed that the LVAD outflow tract is widely patent. Patient is euvolemic on exam, but at times has had orthostasis symptoms; patient encouraged to stay hydrated within his fluid/diet restriction guidelines (no more than 2L/day of fluid intake and no more than 3g of Na per day in dietary intake).    - interrogation showed 2 low flow alarms in the past 24 hrs before epistaxis (has had at least 6 low flow alarms over the past 5 days, has hx of hemorrhoidal bleeding x1 and soft stools)   > 500cc NaCl 0.9% over 2 hrs x2  - Volume status: Euvolemic   > Diuretics: Not needed at this time  - Afterload reduction: MAP goal 65-80; Hydralazine 37.5 TID + Lisinopril 2.5 qday  - Statin: Atorvastatin 40mg  - BB: Holding in setting of recent cardiogenic shock and orthostatic symptoms, consider restarting outpatient  - AA: Holding in setting of recent cardiogenic shock and orthostatic symptoms, consider restarting outpatient  - SGLTi: As outpatient, has been held due to immunosuppression he is on for his SLE.  - Anticoagulation: Holding PTA Warfarin. At goal INR 2.64 (INR 2-3)  - Antiplatelets: Holding PTA ASA 81mg qday.   -    - Holding PTA ASA 81 mg qday     #Epistaxis, improved  # Hx Intubated due to Concern for airway protection (8/7)-Extubated  (8/8)  # Mild-moderate emphysema  # History of COVID-19  # Iatrogenic R apical PTX  H/o nasal perforation  For elective procedure (in [place]), postponed 2/2 LVAD insertion. Epistaxis not fixed by packing overnight on 8/6 and pt continued to bleed. Intubated for airway protection. Controlled at bedside by ENT s/p cautery and packing. Extubated 8/8. Had Cefepime/Vanc empirically for broad coverage for PNA (8/7-8/12). Patient removed his own nasal packing overnight on 8/9. Now admitted for profuse bleeding 1h after taking warfarin. ENT performed nasal package ~2h.  - Holding PTA Ayr nasal saline gel at bedtime + nasal saline q4H given ongoing nasal package  - Follow with Dr Vazquez (EN) in 2 weeks   - Maintain nasal package ~6h    #Fall   #Insomnia  Patient had a fall this AM, slipped and fell on his buttocks. Did not hit his head or LOC. Has had at least 1 episodes in prior hospitalizations with CT head unremarkable (8/7). Denied lightheadedness, dizziness  - Continue PTA Sertraline and Quetiapine   - Melatonin 10mg qday  - Fall precautions  - Orthostatic ordered     # Hypomagnesemia  - IV Mg 2g IV given Mg 1.6     #GERD  #Hx of Hematemesis / oral mucosal bleeding   #Dysphagia  GI consulted 7/31 for black tarry stools and a possible GI bleed, however it is more likely swallowed blood from epistaxis that patient previously had. GI did not recommend an upper endoscopy. Recurrence of bloody stools likely due to epistaxis which have resolved. Hemoglobin was monitored and continued to be stable throughout the rest of the hospitalization. Cleared for regular diet by SLP.  - Na 2g diet      #Anemia due to blood loss from Epistaxis, stable  #History of DVT and PE   #Antiphospholipid antibody syndrome  #History of iron deficiency anemia  Hb stable 11.1  - Transfuse if Hb<7 or actively bleeding  - Trend Hb     #SLE  - Holding PTA meds: hydroxychloroquine 200mg (held before and resumed 7/16)       Diet: Na 2g diet  DVT  Prophylaxis: Holding given epistaxis  Fitzgerald Catheter: Not present  Code Status: Full Code  Fluids: None  Lines: PIV    Disposition Plan   Expected discharge: 2 - 3 days, recommended to prior living arrangement once bleeding hemostasis .    Entered: Keily Bowles MD 09/05/2022, 6:49 AM     The patient's care was discussed with the Attending Physician, . Patient to be staffed this AM with Dr Askew.    Kristie Bowles  Internal Medicine PGY-2  ______________    Chief Complaint   Epistaxis    History is obtained from the patient    Interval Progress  Patient had a fall this AM, fell on his buttocks without hitting his head. No LOC. Patient slipped while trying to go to the bathroom. Fall precautions ordered.    Review of Systems    The 10 point Review of Systems is negative other than noted in the HPI or here.     Past Medical History    I have reviewed this patient's medical history and updated it with pertinent information if needed.   Past Medical History:   Diagnosis Date     Antiphospholipid antibody syndrome (H)      CAD (coronary artery disease)      Chronic systolic heart failure (H)      Ischemic cardiomyopathy      Mitral regurgitation      Systemic lupus erythematosus (H)        Past Surgical History   I have reviewed this patient's surgical history and updated it with pertinent information if needed.  Past Surgical History:   Procedure Laterality Date     COLONOSCOPY N/A 05/23/2022    Procedure: COLONOSCOPY;  Surgeon: Parth Meneses MD;  Location: [facility] OR     CV CORONARY ANGIOGRAM N/A 05/24/2022    Procedure: Coronary Angiogram;  Surgeon: Mike Pope MD;  Location:  [facility]     CV CORONARY ANGIOGRAM N/A 05/29/2022    Procedure: Coronary Angiogram;  Surgeon: Austin Bright MD;  Location:  [facility]     CV CORONARY LITHOTRIPSY PCI Bilateral 05/24/2022    Procedure: Percutaneous Coronary Intervention - Lithotripsy;  Surgeon: Toshia  MD Mike;  Location:  [facility]     CV INTRA AORTIC BALLOON N/A 06/17/2022    Procedure: Intraprocedure Aortic Balloon Pump Insertion;  Surgeon: Sherman Cerda MD;  Location:  [facility]     CV INTRA AORTIC BALLOON Right 07/03/2022    Procedure: Reposition of Subclavian Intraprocedure Aortic Balloon Pump;  Surgeon: Austin Bright MD;  Location:  [facility]     CV INTRAVASULAR ULTRASOUND N/A 05/24/2022    Procedure: Intravascular Ultrasound;  Surgeon: Mike Pope MD;  Location:  [facility]     CV PCI ANGIOPLASTY N/A 05/29/2022    Procedure: Percutaneous Transluminal Angioplasty;  Surgeon: Austin Bright MD;  Location:  [facility]     CV PCI STENT DRUG ELUTING N/A 05/24/2022    Procedure: Percutaneous Coronary Intervention Stent;  Surgeon: Mike Pope MD;  Location:  [facility]     CV RIGHT HEART CATH MEASUREMENTS RECORDED N/A 05/18/2022    Procedure: Right Heart Catheterization;  Surgeon: Justo Stewart MD;  Location:  [facility]ARD[facility] CATH LAB     CV RIGHT HEART CATH MEASUREMENTS RECORDED N/A 05/24/2022    Procedure: Right Heart Catheterization;  Surgeon: Mike Pope MD;  Location:  [facility]ARD[facility]     CV RIGHT HEART CATH MEASUREMENTS RECORDED N/A 05/29/2022    Procedure: Right Heart Catheterization;  Surgeon: Austin Bright MD;  Location:  [facility]ARD[facility] CATH LAB     CV RIGHT HEART CATH MEASUREMENTS RECORDED N/A 07/01/2022    Procedure: Right Heart Catheterization;  Surgeon: Mike Pope MD;  Location:  [facility]     CV RIGHT HEART EXERCISE STRESS STUDY N/A 05/18/2022    Procedure: Stress Drug Study;  Surgeon: Justo Stewart MD;  Location:  [facility]ARD[facility] CATH LAB     CV SWAN HCOCO PROCEDURE N/A 06/17/2022    Procedure: [place] Procedure;  Surgeon: Sherman Cerda MD;  Location:  [facility]ARD[facility] CATH LAB     INCISION AND DRAINAGE CHEST WASHOUT, COMBINED  N/A 07/11/2022    Procedure: Chest washout and closure;  Surgeon: Darnell Morgan MD;  Location: [facility]     INCISION AND DRAINAGE CHEST WASHOUT, COMBINED N/A 07/12/2022    Procedure: INCISION AND DRAINAGE, WOUND, CHEST, WITH IRRIGATION;  Surgeon: Darius Zamora MD;  Location: [facility] OR     INSERT ARTERIAL LINE  07/18/2022          INSERT INTRAAORTIC BALLOON PUMP Right 06/23/2022    Procedure: INSERTION, INTRA-AORTIC BALLOON PUMP RIGHT SUBCLAVIAN ARTERY.;  Surgeon: Hayden Veras MD;  Location: [facility] OR     INSERT VENTRICULAR ASSIST DEVICE LEFT (HEARTMATE II/III) MINIMALLY INVASIVE N/A 07/08/2022    Procedure: MEDIAN STERNOTOMY.  CARDIOPULMONARY BYPASS.  INTRAOPERATIVE TRANSESOPHAGEAL ECHOCARDIOGRAM.  IMPLANT LEFT VENTRICULAR ASSIST DEVICE HEARTMATE III.  PLACEMENT OF PERCUTANEOUS RIGHT VENTRICULAR ASSIST DEVICE.  TEMPORARY CHEST CLOSURE;  Surgeon: Darius Zamora MD;  Location: [facility] OR     IR CHEST TUBE PLACEMENT NON-TUNNELED RIGHT  08/01/2022     IRRIGATION AND DEBRIDEMENT CHEST WASHOUT, COMBINED N/A 07/13/2022    Procedure: IRRIGATION AND DEBRIDEMENT, CHEST;  Surgeon: Darius Zamora MD;  Location: [facility] OR     PICC DOUBLE LUMEN PLACEMENT Right 05/28/2022    right basilic 5 fr dl picc 40 cm     PICC DOUBLE LUMEN PLACEMENT Right 08/15/2022    Right Lateral, 41 total length, 3 cm external length       Social History   I have reviewed this patient's social history and updated it with pertinent information if needed.     Family History   I have reviewed this patient's family history and updated it with pertinent information if needed.     No significant family history, including no history of:     Prior to Admission Medications   Prior to Admission Medications   Prescriptions Last Dose Informant Patient Reported? Taking?   Melatonin 10 MG TABS tablet   No No   Sig: Take 1 tablet (10 mg) by mouth At Bedtime for 30 days   QUEtiapine (SEROQUEL) 25 MG tablet   No No   Sig: Take 1 tablet (25 mg) by mouth 2 times  daily for 30 days   QUEtiapine (SEROQUEL) 50 MG tablet   No No   Sig: Take 3 tablets (150 mg) by mouth At Bedtime for 30 days   acetaminophen (TYLENOL) 325 MG tablet   No No   Sig: Take 3 tablets (975 mg) by mouth every 8 hours for 30 days   aspirin (ASA) 81 MG chewable tablet   No No   Sig: Take 1 tablet (81 mg) by mouth daily for 30 days   atorvastatin (LIPITOR) 40 MG tablet   No No   Sig: Take 1 tablet (40 mg) by mouth every evening for 30 days   digoxin (LANOXIN) 125 MCG tablet   No No   Sig: Take 1 tablet (125 mcg) by mouth daily for 30 days   fluticasone-vilanterol (BREO ELLIPTA) 100-25 MCG/INH inhaler   No No   Sig: Inhale 1 puff into the lungs daily for 30 days   hydrALAZINE (APRESOLINE) 50 MG tablet   No No   Sig: Take 1 tablet (50 mg) by mouth 3 times daily for 30 days   hydrOXYzine (ATARAX) 25 MG tablet   No No   Sig: Take 1 tablet (25 mg) by mouth every 6 hours as needed for anxiety (ANXIETY)   hydrocortisone 1 % CREA cream   No No   Sig: Place rectally 3 times daily for 2 days   hydroxychloroquine (PLAQUENIL) 200 MG tablet   Yes No   Sig: Take 200 mg by mouth 2 times daily   lisinopril (ZESTRIL) 10 MG tablet   No No   Sig: Take 1 tablet (10 mg) by mouth daily for 30 days   melatonin 3 MG tablet   Yes No   Sig: Take 6 mg by mouth nightly as needed for sleep   multivitamin w/minerals (THERA-VIT-M) tablet   No No   Sig: Take 1 tablet by mouth daily   mycophenolate (GENERIC EQUIVALENT) 500 MG tablet   No No   Sig: Take 3 tablets (1,500 mg) by mouth 2 times daily for 30 days   oxyCODONE (ROXICODONE) 5 MG tablet   No No   Sig: Take 1 tablet (5 mg) by mouth every 6 hours as needed for moderate to severe pain   pantoprazole (PROTONIX) 40 MG EC tablet   No No   Sig: Take 1 tablet (40 mg) by mouth daily   pramox-pe-glycerin-petrolatum (PREPARATION H) 1-0.25-14.4-15 % CREA cream   No No   Sig: Place rectally 2 times daily as needed for hemorrhoids Apply immediately after a bowel movement.   promethazine  (PHENERGAN) 12.5 MG tablet   No No   Sig: Take 1 tablet (12.5 mg) by mouth every 6 hours as needed for nausea or vomiting   saline nasal (AYR SALINE) GEL topical gel   No No   Sig: Apply into each nare At Bedtime   sertraline (ZOLOFT) 100 MG tablet   No No   Sig: Take 1 tablet (100 mg) by mouth daily for 30 days   sodium chloride (OCEAN) 0.65 % nasal spray   No No   Sig: Spray 2 sprays into both nostrils every 4 hours (while awake)   thiamine (B-1) 100 MG tablet   No No   Sig: Take 1 tablet (100 mg) by mouth daily   warfarin ANTICOAGULANT (COUMADIN) 2.5 MG tablet   No No   Sig: Take 2 tabs (5mg) Monday, Wednesday, Friday. Take 3 tabs (7.5mg) Tuesday, Thursday, Saturday, Sunday. Future dose adjustments pending INR   zolpidem (AMBIEN) 5 MG tablet   Yes No   Sig: Take 5 mg by mouth nightly as needed for sleep      Facility-Administered Medications: None     Allergies   No Known Allergies    Physical Exam   Vital Signs: Temp: 97.5  F (36.4  C) Temp src: Oral   Pulse: 97   Resp: 18 SpO2: 94 % O2 Device: None (Room air)    Weight: 0 lbs 0 oz    Constitutional: awake, alert, cooperative, no apparent distress, and appears stated age  Hematologic / Lymphatic: no cervical lymphadenopathy and no supraclavicular lymphadenopathy. Looks pale 2/4. Mild mucous membranes.  Respiratory: No increased work of breathing, good air exchange, clear to auscultation bilaterally, no crackles or wheezing  Cardiovascular: LVAD murmurs. No JVD.  GI: No scars, normal bowel sounds, soft, non-distended, non-tender, no masses palpated, no hepatosplenomegally, LVAD insertion line without subcutaneous air, no purulent discharge  Neurologic: Awake, alert, oriented to name, place and time.  No focal deficit.     Data   Data reviewed today: I reviewed all medications, new labs and imaging results over the last 24 hours. I personally reviewed Cardiology specific data review: the EKG tracing showing No ischemic ST/T changes     Recent Labs   Lab  09/04/22  2333 09/04/22  2234 09/04/22  0601 09/03/22  2224 09/03/22  1348 09/03/22  0607 09/02/22  2200 09/02/22  1455 09/02/22  0606 09/01/22  0606 08/30/22  0554 08/29/22  0804   WBC  --  6.7  --   --   --   --   --  7.5  --  5.5  --  6.8   HGB  --  11.1* 11.1* 10.0*   < > 10.4*   < > 11.0*  --  9.9*  --  10.9*   MCV  --  92  --   --   --   --   --  94  --  94  --  93   PLT  --  307  --   --   --   --   --  312  --  253  --  304   INR 2.64*  --  2.69*  --   --  2.64*  --   --    < > 2.25*   < > 2.32*   NA  --  137  --   --   --   --   --   --   --  140  --  140   POTASSIUM  --  4.2  --   --   --   --   --   --   --  3.9  --  3.9   CHLORIDE  --  106  --   --   --   --   --   --   --  112*  --  108   CO2  --  16*  --   --   --   --   --   --   --  22  --  24   BUN  --  17.6  --   --   --   --   --   --   --  13  --  14   CR  --  0.59*  --   --   --   --   --   --   --  0.56*  --  0.69   ANIONGAP  --  15  --   --   --   --   --   --   --  6  --  8   ELDA  --  9.5  --   --   --   --   --   --   --  9.4  --  9.6   GLC  --  112*  --   --   --   --   --   --   --  94  --  104*   ALBUMIN  --  3.8  --   --   --   --   --   --   --  3.1*  --  3.2*   PROTTOTAL  --  7.7  --   --   --   --   --   --   --  7.1  --  8.1   BILITOTAL  --  0.4  --   --   --   --   --   --   --  0.4  --  0.5   ALKPHOS  --  133*  --   --   --   --   --   --   --  131  --  156*   ALT  --  15  --   --   --   --   --   --   --  22  --  23   AST  --  42  --   --   --   --   --   --   --  21  --  24    < > = values in this interval not displayed.     Most Recent 3 CBC's:  Recent Labs   Lab Test 09/04/22 2234 09/04/22  0601 09/03/22 2224 09/02/22  2200 09/02/22  1455 09/01/22  0606   WBC 6.7  --   --   --  7.5 5.5   HGB 11.1* 11.1* 10.0*   < > 11.0* 9.9*   MCV 92  --   --   --  94 94     --   --   --  312 253    < > = values in this interval not displayed.     Most Recent 3 BMP's:  Recent Labs   Lab Test 09/04/22 2234 09/01/22  0606  08/29/22  0804    140 140   POTASSIUM 4.2 3.9 3.9   CHLORIDE 106 112* 108   CO2 16* 22 24   BUN 17.6 13 14   CR 0.59* 0.56* 0.69   ANIONGAP 15 6 8   ELDA 9.5 9.4 9.6   * 94 104*     Most Recent 2 LFT's:  Recent Labs   Lab Test 09/04/22 2234 09/01/22  0606   AST 42 21   ALT 15 22   ALKPHOS 133* 131   BILITOTAL 0.4 0.4     Most Recent 3 Hemoglobins:  Recent Labs   Lab Test 09/04/22 2234 09/04/22  0601 09/03/22  2224   HGB 11.1* 11.1* 10.0*     Most Recent 3 BNP's:  Recent Labs   Lab Test 09/04/22 2234 06/17/22  1746 05/27/22  2215   NTBNPI 928* 4,611* 2,006*     Most Recent D-dimer:  Recent Labs   Lab Test 08/01/22  0742   DD 6.19*     Most Recent Cholesterol Panel:  Recent Labs   Lab Test 07/21/22 0331 07/11/22  0337 06/18/22  0334   CHOL  --   --  79   LDL  --   --  38   HDL  --   --  30*   TRIG 110   < > 53    < > = values in this interval not displayed.     Most Recent TSH and T4:  Recent Labs   Lab Test 05/20/22  0516   TSH 1.65     Most Recent Hemoglobin A1c:  Recent Labs   Lab Test 05/20/22  0516   A1C 5.5     Most Recent ABG:  Recent Labs   Lab Test 08/08/22  1607   PH 7.42   PO2 88   PCO2 41   HCO3 26   DARLENE 1.3     Most Recent ESR & CRP:  Recent Labs   Lab Test 09/04/22 2234 08/03/22  0606 06/19/22  1522   SED  --   --  39*   CRP 13.50*   < > 28.0*    < > = values in this interval not displayed.     Most Recent Anemia Panel:  Recent Labs   Lab Test 09/04/22 2234 06/24/22  1711 06/24/22  1005   WBC 6.7   < >  --    HGB 11.1*   < >  --    HCT 35.0*   < >  --    MCV 92   < >  --       < >  --    IRON  --   --  42*   IRONSAT  --   --  16   FEB  --   --  260   SHAWN  --   --  365    < > = values in this interval not displayed.     Recent Results (from the past 24 hour(s))   XR Chest Port 1 View    Narrative    EXAM: XR CHEST PORT 1 VIEW  LOCATION: [facility]  DATE/TIME: 9/5/2022 12:22 AM    INDICATION: LVAD malfunction.  COMPARISON: 08/12/2022.       Impression    IMPRESSION: A left ventricular assist device appears in unchanged position.    No acute airspace consolidation. Unchanged elevation of the right hemidiaphragm. No pleural effusion or pneumothorax.    Postsurgical changes of median sternotomy. Coronary artery stents are present. Atheromatous calcifications of the thoracic aorta. Left chest wall pacer/defibrillator is present.    Postsurgical changes overlying the region of the right scapula.

## 2022-09-05 NOTE — H&P
[facility]    Cardiology History and Physical - Cardiology    Date of Admission:  9/4/2022    Assessment & Plan: MARISOL Dorman EDUARD Sands is a 61 year old male admitted on 9/5/2022. He is a 61M with pmhx of SLE, antiphospholipid syndrome, hx of history pulmonary embolism (on anticoagulation with warfarin), HFrEF due to ICM, HLD. He initially presented to [facility] on 6/13 due to nausea, vomiting, abdominal bloating and transferred to [facility] for admission on 6/17/2022 for decompensated heart failure 2/2 cardiogenic shock c/b multiorgan failure requiring IABP, is s/p HM 3 LVAD on 7/8/2022. Hospitalization was also c/b RV, had 29F Protek duo via RIJ, RVAD removed 7/21, hemopericardium requiring repeat washout, chest was closed 7/13. He then developed epistaxis on 8/6 ENT. Patient has h/o nasal perforation that required elective procedure (in [place]) which was postponed due to LVAD insertion.  He required intubation 8/7 for airway protection, s/p extubated 8/8. Nasal pack removed 8/9. Now admitted for epistaxis rebleeding after being discharged from ARU this AM.     #HFrEF 2/2 ICM  in setting of cardiogenic shock (SCAI D) s/p HM3 LVAD  #RV failure s/p Protek duo temporary RVAD s/p decannulation 7/21  #RV thrombus  #Post chest closure hemopericardium s/p repeat washout (7/12)  #PMH CAD s/p PCI to LAD (2005)  #S/p CRT-D (2006)  Patient with ICM s/p HM3 LVAD 7/8/22 2/2 cardiogenic shock requiring MCS with IABP as prior to HM (initially evaluated for heart transplant, however noted to have substantially elevated DSAs during course of care, so decision made to proceed with LVAD given patient was already on temporary MCS). Intraoperative course during LVAD placement was c/b RVF resulting in cardiogenic shock requiring high dose pressors necessitating temporary RVAD support. Initial post-op course c/b hemopericardium requiring repeat washout with chest left open,  with good recovery extubation and decannulation on 7/21. Patient tolerating hemodynamics and end organ standpoint well. He has had intermittent low flow alarms with high PI, no power spike, and a closed aortic valve on TTE. TTE also showed underfilling LV which could explain his low flow alarms d/t suckdown event so his LVAD speed was reduced to 5200. Patient did have a low flow alarm on 8/20 @ 1145 (PI 8-9 and not hypotensive); CTA chest done which showed that the LVAD outflow tract is widely patent. Patient is euvolemic on exam, but at times has had orthostasis symptoms; patient encouraged to stay hydrated within his fluid/diet restriction guidelines (no more than 2L/day of fluid intake and no more than 3g of Na per day in dietary intake).    - Interrogation showed 2 low flow alarms in the past 24 hrs before epistaxis (has had at least 6 low flow alarms over the past 5 days, has hx of hemorrhoidal bleeding x1 and soft stools)   > 500cc NaCl 0.9% over 2 hrs  - Volume status: Euvolemic   > Diuretics: Not needed at this time  - Afterload reduction: MAP goal 65-80; Hydralazine 37.5 TID + Lisinopril 2.5 qday  - Statin: Atorvastatin 40mg  - BB: Holding in setting of recent cardiogenic shock and orthostatic symptoms, consider restarting outpatient  - AA: Holding in setting of recent cardiogenic shock and orthostatic symptoms, consider restarting outpatient  - SGLTi: As outpatient, has been held due to immunosuppression he is on for his SLE.  - Anticoagulation: Holding PTA Warfarin. At goal INR 2.64 (INR 2-3). Pharmacy to dose warfarin  - LDH pending  - Antiplatelets:  Holding PTA ASA 81 mg qday  - Low PI events    > Orthostatics ordered   > EKG   > MAP Doppler     #Epistaxis, improved  # Hx Intubated due to Concern for airway protection (8/7)-Extubated (8/8)  # Mild-moderate emphysema  # History of COVID-19  # Iatrogenic R apical PTX  H/o nasal perforation  For elective procedure (in [place]), postponed 2/2 LVAD  insertion. Epistaxis not fixed by packing overnight on 8/6 and pt continued to bleed. Intubated for airway protection. Controlled at bedside by ENT s/p cautery and packing. Extubated 8/8. Had Cefepime/Vanc empirically for broad coverage for PNA (8/7-8/12). Patient removed his own nasal packing overnight on 8/9. Now admitted for profuse bleeding 1h after taking warfarin. ENT performed nasal package ~2h.  - Holding PTA Ayr nasal saline gel at bedtime + nasal saline q4H given ongoing nasal package  - Follow with Dr Vazquez (EN) in 2 weeks        #Insomnia/Delirium, resolved  CT head unremarkable (8/7) obtained for possible fall Patient had some issues with delirium and insomnia during hospitalization and psychiatry was consulted who recommended  - Continue PTA Sertraline and Quetiapine   - Melatonin 10mg qday  - Fall precautions     #GERD  #Hx of Hematemesis / oral mucosal bleeding   #Dysphagia  GI consulted 7/31 for black tarry stools and a possible GI bleed, however it is more likely swallowed blood from epistaxis that patient previously had. GI did not recommend an upper endoscopy. Recurrence of bloody stools likely due to epistaxis which have resolved. Hemoglobin was monitored and continued to be stable throughout the rest of the hospitalization. Cleared for regular diet by SLP.  - Na 2g diet      #Anemia due to blood loss from Epistaxis, stable  #History of DVT and PE   #Antiphospholipid antibody syndrome  #History of iron deficiency anemia  Hb stable  - Transfuse if Hb<7 or actively bleeding     #SLE  - Holding PTA meds: hydroxychloroquine 200mg (held before and resumed 7/16)     #Oral thrush, resolved  Completed 14 days of nystatin       Diet: Na 2g diet  DVT Prophylaxis: Holding given epistaxis  Fitzgerald Catheter: Not present  Code Status: Full Code  Fluids: None  Lines: PIV    Disposition Plan   Expected discharge: 2 - 3 days, recommended to prior living arrangement once bleeding hemostasis .    Entered: Keily  Carrillo Bowles MD 09/05/2022, 2:02 AM     The patient's care was discussed with the Attending Physician, . Patient to be staffed this AM with Dr Askew.    Kristie Bowles  Internal Medicine PGY-2  ______________    Chief Complaint   Epistaxis    History is obtained from the patient    History of Present Illness   Dandy Sands is a 61 year old male admitted on 9/4/2022. He is a 61M with pmhx of SLE, antiphospholipid syndrome, hx of history pulmonary embolism (on anticoagulation with warfarin), HFrEF due to ICM, HLD. He initially presented to [facility] on 6/13 due to nausea, vomiting, abdominal bloating and transferred to [facility] for admission on 6/17/2022 for decompensated heart failure 2/2 cardiogenic shock c/b multiorgan failure requiring IABP, is s/p HM 3 LVAD on 7/8/2022. Hospitalization was also c/b RV, had 29F Protek duo via RIJ, RVAD removed 7/21, hemopericardium requiring repeat washout, chest was closed 7/13. He then developed epistaxis on 8/6 ENT. Patient has h/o nasal perforation that required elective procedure (in [place]) which was postponed due to LVAD insertion.  He required intubation 8/7 for airway protection, s/p extubated 8/8. Nasal pack removed 8/9. Now admitted for epistaxis rebleeding after being discharged from ARU this AM.    Patient developed profuse bleeding this evening, 1h after taking warfarin. He was recently discharge this AM from ARU, he actually was going to be discharged on 09/02 but developed hemorrhoidal bleeding, small amount needing to stay in ARU for monitoring. Since then, he had some soft stools, today at least 3 without bleeding. Denies fever, night sweats, chills, abdominal pain, melena, hematemesis, hemoptysis, spontaneous bruises. Has had low PI events for the past week one every other day until 3 days ago, and then at least 2 daily.     Review of Systems    The 10 point Review of Systems is negative other than noted in the HPI or here.     Past  Medical History    I have reviewed this patient's medical history and updated it with pertinent information if needed.   Past Medical History:   Diagnosis Date     Antiphospholipid antibody syndrome (H)      CAD (coronary artery disease)      Chronic systolic heart failure (H)      Ischemic cardiomyopathy      Mitral regurgitation      Systemic lupus erythematosus (H)        Past Surgical History   I have reviewed this patient's surgical history and updated it with pertinent information if needed.  Past Surgical History:   Procedure Laterality Date     COLONOSCOPY N/A 05/23/2022    Procedure: COLONOSCOPY;  Surgeon: Parth Meneses MD;  [facility]     CV CORONARY ANGIOGRAM N/A 05/24/2022    Procedure: Coronary Angiogram;  Surgeon: Mike Pope MD;  [facility]     CV CORONARY ANGIOGRAM N/A 05/29/2022    Procedure: Coronary Angiogram;  Surgeon: Austin Bright MD;  [facility]     CV CORONARY LITHOTRIPSY PCI Bilateral 05/24/2022    Procedure: Percutaneous Coronary Intervention - Lithotripsy;  Surgeon: Mike Pope MD;  [facility]     CV INTRA AORTIC BALLOON N/A 06/17/2022    Procedure: Intraprocedure Aortic Balloon Pump Insertion;  Surgeon: Sherman Cerda MD;  [facility]     CV INTRA AORTIC BALLOON Right 07/03/2022    Procedure: Reposition of Subclavian Intraprocedure Aortic Balloon Pump;  Surgeon: Austin Bright MD;  [facility]     CV INTRAVASULAR ULTRASOUND N/A 05/24/2022    Procedure: Intravascular Ultrasound;  Surgeon: Mike Pope MD;  [facility]     CV PCI ANGIOPLASTY N/A 05/29/2022    Procedure: Percutaneous Transluminal Angioplasty;  Surgeon: Austin Bright MD;  [facility]     CV PCI STENT DRUG ELUTING N/A 05/24/2022    Procedure: Percutaneous Coronary Intervention Stent;  Surgeon: Toshia  MD Mike;  Location:  HEART CARDIAC CATH LAB     CV RIGHT HEART CATH MEASUREMENTS RECORDED N/A 05/18/2022    Procedure: Right Heart Catheterization;  Surgeon: Justo Stewart MD;  [facility]     CV RIGHT HEART CATH MEASUREMENTS RECORDED N/A 05/24/2022    Procedure: Right Heart Catheterization;  Surgeon: Mike Pope MD;  [facility]     CV RIGHT HEART CATH MEASUREMENTS RECORDED N/A 05/29/2022    Procedure: Right Heart Catheterization;  Surgeon: Austin Bright MD;  [facility]     CV RIGHT HEART CATH MEASUREMENTS RECORDED N/A 07/01/2022    Procedure: Right Heart Catheterization;  Surgeon: Mike Pope MD;  [facility]     CV RIGHT HEART EXERCISE STRESS STUDY N/A 05/18/2022    Procedure: Stress Drug Study;  Surgeon: Justo Stewart MD;  Location:  HEART CARDIAC CATH LAB     CV SWAN CHOCO PROCEDURE N/A 06/17/2022    Procedure: [place] Procedure;  Surgeon: Sherman Cerda MD;  Location:  HEART CARDIAC CATH LAB     INCISION AND DRAINAGE CHEST WASHOUT, COMBINED N/A 07/11/2022    Procedure: Chest washout and closure;  Surgeon: Darnell Morgan MD;  [facility]     INCISION AND DRAINAGE CHEST WASHOUT, COMBINED N/A 07/12/2022    Procedure: INCISION AND DRAINAGE, WOUND, CHEST, WITH IRRIGATION;  Surgeon: Darius Zamora MD;  [facility]     INSERT ARTERIAL LINE  07/18/2022          INSERT INTRAAORTIC BALLOON PUMP Right 06/23/2022    Procedure: INSERTION, INTRA-AORTIC BALLOON PUMP RIGHT SUBCLAVIAN ARTERY.;  Surgeon: Hayden Veras MD;  [facility]     INSERT VENTRICULAR ASSIST DEVICE LEFT (HEARTMATE II/III) MINIMALLY INVASIVE N/A 07/08/2022    Procedure: MEDIAN STERNOTOMY.  CARDIOPULMONARY BYPASS.  INTRAOPERATIVE TRANSESOPHAGEAL ECHOCARDIOGRAM.  IMPLANT LEFT VENTRICULAR ASSIST DEVICE HEARTMATE III.  PLACEMENT OF PERCUTANEOUS RIGHT VENTRICULAR ASSIST DEVICE.  TEMPORARY CHEST CLOSURE;   Surgeon: Darius Zamora MD;  [facility]     IR CHEST TUBE PLACEMENT NON-TUNNELED RIGHT  08/01/2022     IRRIGATION AND DEBRIDEMENT CHEST WASHOUT, COMBINED N/A 07/13/2022    Procedure: IRRIGATION AND DEBRIDEMENT, CHEST;  Surgeon: Darius Zamora MD;  [facility]: [facility]     PICC DOUBLE LUMEN PLACEMENT Right 05/28/2022    right basilic 5 fr dl picc 40 cm     PICC DOUBLE LUMEN PLACEMENT Right 08/15/2022    Right Lateral, 41 total length, 3 cm external length       Social History   I have reviewed this patient's social history and updated it with pertinent information if needed.     Family History   I have reviewed this patient's family history and updated it with pertinent information if needed.     No significant family history, including no history of:     Prior to Admission Medications   Prior to Admission Medications   Prescriptions Last Dose Informant Patient Reported? Taking?   Melatonin 10 MG TABS tablet   No No   Sig: Take 1 tablet (10 mg) by mouth At Bedtime for 30 days   QUEtiapine (SEROQUEL) 25 MG tablet   No No   Sig: Take 1 tablet (25 mg) by mouth 2 times daily for 30 days   QUEtiapine (SEROQUEL) 50 MG tablet   No No   Sig: Take 3 tablets (150 mg) by mouth At Bedtime for 30 days   acetaminophen (TYLENOL) 325 MG tablet   No No   Sig: Take 3 tablets (975 mg) by mouth every 8 hours for 30 days   aspirin (ASA) 81 MG chewable tablet   No No   Sig: Take 1 tablet (81 mg) by mouth daily for 30 days   atorvastatin (LIPITOR) 40 MG tablet   No No   Sig: Take 1 tablet (40 mg) by mouth every evening for 30 days   digoxin (LANOXIN) 125 MCG tablet   No No   Sig: Take 1 tablet (125 mcg) by mouth daily for 30 days   fluticasone-vilanterol (BREO ELLIPTA) 100-25 MCG/INH inhaler   No No   Sig: Inhale 1 puff into the lungs daily for 30 days   hydrALAZINE (APRESOLINE) 50 MG tablet   No No   Sig: Take 1 tablet (50 mg) by mouth 3 times daily for 30 days   hydrOXYzine (ATARAX) 25 MG tablet   No No   Sig: Take 1  tablet (25 mg) by mouth every 6 hours as needed for anxiety (ANXIETY)   hydrocortisone 1 % CREA cream   No No   Sig: Place rectally 3 times daily for 2 days   hydroxychloroquine (PLAQUENIL) 200 MG tablet   Yes No   Sig: Take 200 mg by mouth 2 times daily   lisinopril (ZESTRIL) 10 MG tablet   No No   Sig: Take 1 tablet (10 mg) by mouth daily for 30 days   melatonin 3 MG tablet   Yes No   Sig: Take 6 mg by mouth nightly as needed for sleep   multivitamin w/minerals (THERA-VIT-M) tablet   No No   Sig: Take 1 tablet by mouth daily   mycophenolate (GENERIC EQUIVALENT) 500 MG tablet   No No   Sig: Take 3 tablets (1,500 mg) by mouth 2 times daily for 30 days   oxyCODONE (ROXICODONE) 5 MG tablet   No No   Sig: Take 1 tablet (5 mg) by mouth every 6 hours as needed for moderate to severe pain   pantoprazole (PROTONIX) 40 MG EC tablet   No No   Sig: Take 1 tablet (40 mg) by mouth daily   pramox-pe-glycerin-petrolatum (PREPARATION H) 1-0.25-14.4-15 % CREA cream   No No   Sig: Place rectally 2 times daily as needed for hemorrhoids Apply immediately after a bowel movement.   promethazine (PHENERGAN) 12.5 MG tablet   No No   Sig: Take 1 tablet (12.5 mg) by mouth every 6 hours as needed for nausea or vomiting   saline nasal (AYR SALINE) GEL topical gel   No No   Sig: Apply into each nare At Bedtime   sertraline (ZOLOFT) 100 MG tablet   No No   Sig: Take 1 tablet (100 mg) by mouth daily for 30 days   sodium chloride (OCEAN) 0.65 % nasal spray   No No   Sig: Spray 2 sprays into both nostrils every 4 hours (while awake)   thiamine (B-1) 100 MG tablet   No No   Sig: Take 1 tablet (100 mg) by mouth daily   warfarin ANTICOAGULANT (COUMADIN) 2.5 MG tablet   No No   Sig: Take 2 tabs (5mg) Monday, Wednesday, Friday. Take 3 tabs (7.5mg) Tuesday, Thursday, Saturday, Sunday. Future dose adjustments pending INR   zolpidem (AMBIEN) 5 MG tablet   Yes No   Sig: Take 5 mg by mouth nightly as needed for sleep      Facility-Administered Medications:  None     Allergies   No Known Allergies    Physical Exam   Vital Signs: Temp: 97.5  F (36.4  C) Temp src: Oral   Pulse: 96   Resp: 16 SpO2: 92 % O2 Device: None (Room air)    Weight: 0 lbs 0 oz    Constitutional: awake, alert, cooperative, no apparent distress, and appears stated age  Hematologic / Lymphatic: no cervical lymphadenopathy and no supraclavicular lymphadenopathy. Looks pale  Respiratory: No increased work of breathing, good air exchange, clear to auscultation bilaterally, no crackles or wheezing  Cardiovascular: LVAD murmurs. No JVD.  GI: No scars, normal bowel sounds, soft, non-distended, non-tender, no masses palpated, no hepatosplenomegally, LVAD insertion line without subcutaneous air, no purulent discharge  Neurologic: Awake, alert, oriented to name, place and time.  No focal deficit.     Data   Data reviewed today: I reviewed all medications, new labs and imaging results over the last 24 hours. I personally reviewed Cardiology specific data review: the EKG tracing showing No ischemic ST/T changes     Recent Labs   Lab 09/04/22  2333 09/04/22  2234 09/04/22  0601 09/03/22  2224 09/03/22  1348 09/03/22  0607 09/02/22  2200 09/02/22  1455 09/02/22  0606 09/01/22  0606 08/30/22  0554 08/29/22  0804   WBC  --  6.7  --   --   --   --   --  7.5  --  5.5  --  6.8   HGB  --  11.1* 11.1* 10.0*   < > 10.4*   < > 11.0*  --  9.9*  --  10.9*   MCV  --  92  --   --   --   --   --  94  --  94  --  93   PLT  --  307  --   --   --   --   --  312  --  253  --  304   INR 2.64*  --  2.69*  --   --  2.64*  --   --    < > 2.25*   < > 2.32*   NA  --  137  --   --   --   --   --   --   --  140  --  140   POTASSIUM  --  4.2  --   --   --   --   --   --   --  3.9  --  3.9   CHLORIDE  --  106  --   --   --   --   --   --   --  112*  --  108   CO2  --  16*  --   --   --   --   --   --   --  22  --  24   BUN  --  17.6  --   --   --   --   --   --   --  13  --  14   CR  --  0.59*  --   --   --   --   --   --   --  0.56*  --   0.69   ANIONGAP  --  15  --   --   --   --   --   --   --  6  --  8   ELDA  --  9.5  --   --   --   --   --   --   --  9.4  --  9.6   GLC  --  112*  --   --   --   --   --   --   --  94  --  104*   ALBUMIN  --  3.8  --   --   --   --   --   --   --  3.1*  --  3.2*   PROTTOTAL  --  7.7  --   --   --   --   --   --   --  7.1  --  8.1   BILITOTAL  --  0.4  --   --   --   --   --   --   --  0.4  --  0.5   ALKPHOS  --  133*  --   --   --   --   --   --   --  131  --  156*   ALT  --  15  --   --   --   --   --   --   --  22  --  23   AST  --  42  --   --   --   --   --   --   --  21  --  24    < > = values in this interval not displayed.     Most Recent 3 CBC's:Recent Labs   Lab Test 09/04/22 2234 09/04/22  0601 09/03/22 2224 09/02/22  2200 09/02/22  1455 09/01/22  0606   WBC 6.7  --   --   --  7.5 5.5   HGB 11.1* 11.1* 10.0*   < > 11.0* 9.9*   MCV 92  --   --   --  94 94     --   --   --  312 253    < > = values in this interval not displayed.     Most Recent 3 BMP's:Recent Labs   Lab Test 09/04/22 2234 09/01/22  0606 08/29/22  0804    140 140   POTASSIUM 4.2 3.9 3.9   CHLORIDE 106 112* 108   CO2 16* 22 24   BUN 17.6 13 14   CR 0.59* 0.56* 0.69   ANIONGAP 15 6 8   ELDA 9.5 9.4 9.6   * 94 104*     Most Recent 2 LFT's:Recent Labs   Lab Test 09/04/22 2234 09/01/22  0606   AST 42 21   ALT 15 22   ALKPHOS 133* 131   BILITOTAL 0.4 0.4     Most Recent 3 Hemoglobins:Recent Labs   Lab Test 09/04/22  2234 09/04/22  0601 09/03/22  2224   HGB 11.1* 11.1* 10.0*     Most Recent 3 BNP's:Recent Labs   Lab Test 09/04/22  2234 06/17/22  1746 05/27/22  2215   NTBNPI 928* 4,611* 2,006*     Most Recent D-dimer:Recent Labs   Lab Test 08/01/22  0742   DD 6.19*     Most Recent Cholesterol Panel:Recent Labs   Lab Test 07/21/22  0331 07/11/22  0337 06/18/22  0334   CHOL  --   --  79   LDL  --   --  38   HDL  --   --  30*   TRIG 110   < > 53    < > = values in this interval not displayed.     Most Recent TSH and T4:Recent  Labs   Lab Test 05/20/22  0516   TSH 1.65     Most Recent Hemoglobin A1c:Recent Labs   Lab Test 05/20/22  0516   A1C 5.5     Most Recent ABG:Recent Labs   Lab Test 08/08/22  1607   PH 7.42   PO2 88   PCO2 41   HCO3 26   DARLENE 1.3     Most Recent ESR & CRP:Recent Labs   Lab Test 09/04/22 2234 08/03/22  0606 06/19/22  1522   SED  --   --  39*   CRP 13.50*   < > 28.0*    < > = values in this interval not displayed.     Most Recent Anemia Panel:Recent Labs   Lab Test 09/04/22 2234 06/24/22  1711 06/24/22  1005   WBC 6.7   < >  --    HGB 11.1*   < >  --    HCT 35.0*   < >  --    MCV 92   < >  --       < >  --    IRON  --   --  42*   IRONSAT  --   --  16   FEB  --   --  260   SHAWN  --   --  365    < > = values in this interval not displayed.     Recent Results (from the past 24 hour(s))   XR Chest Port 1 View    Narrative    EXAM: XR CHEST PORT 1 VIEW  LOCATION: [facility]  DATE/TIME: 9/5/2022 12:22 AM    INDICATION: LVAD malfunction.  COMPARISON: 08/12/2022.      Impression    IMPRESSION: A left ventricular assist device appears in unchanged position.    No acute airspace consolidation. Unchanged elevation of the right hemidiaphragm. No pleural effusion or pneumothorax.    Postsurgical changes of median sternotomy. Coronary artery stents are present. Atheromatous calcifications of the thoracic aorta. Left chest wall pacer/defibrillator is present.    Postsurgical changes overlying the region of the right scapula.

## 2022-09-05 NOTE — PROGRESS NOTES
D: Patient's son Romero called to report patient has a bloody nose.     I/A:  Has been holding pressure for 15 minutes, does not feel it has stopped yet.     P: Advised to hold pressure for five more minutes, do not blow nose, put anything up nose. If nose continues to bleed page me back and we will have you go to ED. Maldonado paged back, continuing to bleed- coming out both nostrils. Some dizziness, they're heading to ED. I spoke with ED Triage RN Yoanna. Also notified Cards 2 fellow Lm Kline.  Caregiver notified to page on-call coordinator if symptoms worsen or with other concerns. Caregiver verbalized understanding.

## 2022-09-06 ENCOUNTER — LAB (OUTPATIENT)
Dept: LAB | Facility: CLINIC | Age: 62
DRG: 371 | End: 2022-09-06
Attending: INTERNAL MEDICINE
Payer: COMMERCIAL

## 2022-09-06 ENCOUNTER — PATIENT OUTREACH (OUTPATIENT)
Dept: CARE COORDINATION | Facility: CLINIC | Age: 62
End: 2022-09-06

## 2022-09-06 ENCOUNTER — TELEPHONE (OUTPATIENT)
Dept: OTOLARYNGOLOGY | Facility: CLINIC | Age: 62
End: 2022-09-06

## 2022-09-06 ENCOUNTER — HOSPITAL ENCOUNTER (OUTPATIENT)
Dept: CARDIOLOGY | Facility: CLINIC | Age: 62
Discharge: HOME | DRG: 371 | End: 2022-09-06
Attending: INTERNAL MEDICINE
Payer: COMMERCIAL

## 2022-09-06 ENCOUNTER — CARE COORDINATION (OUTPATIENT)
Dept: CARDIOLOGY | Facility: CLINIC | Age: 62
End: 2022-09-06

## 2022-09-06 ENCOUNTER — DOCUMENTATION ONLY (OUTPATIENT)
Dept: ANTICOAGULATION | Facility: CLINIC | Age: 62
End: 2022-09-06

## 2022-09-06 ENCOUNTER — HOSPITAL ENCOUNTER (INPATIENT)
Facility: CLINIC | Age: 62
LOS: 9 days | Discharge: HOME HEALTH | DRG: 371 | End: 2022-09-15
Attending: EMERGENCY MEDICINE | Admitting: INTERNAL MEDICINE
Payer: COMMERCIAL

## 2022-09-06 DIAGNOSIS — I50.22 CHRONIC SYSTOLIC CONGESTIVE HEART FAILURE (H): ICD-10-CM

## 2022-09-06 DIAGNOSIS — I50.22 CHRONIC SYSTOLIC CONGESTIVE HEART FAILURE (H): Primary | ICD-10-CM

## 2022-09-06 DIAGNOSIS — Z95.811 LVAD (LEFT VENTRICULAR ASSIST DEVICE) PRESENT (H): ICD-10-CM

## 2022-09-06 DIAGNOSIS — T82.9XXA COMPLICATION INVOLVING LEFT VENTRICULAR ASSIST DEVICE (LVAD), INITIAL ENCOUNTER: ICD-10-CM

## 2022-09-06 DIAGNOSIS — I50.20 HEART FAILURE WITH REDUCED EJECTION FRACTION, NYHA CLASS III (H): ICD-10-CM

## 2022-09-06 DIAGNOSIS — Z20.822 COVID-19 VIRUS NOT DETECTED: ICD-10-CM

## 2022-09-06 DIAGNOSIS — R19.7 ACUTE DIARRHEA: Primary | ICD-10-CM

## 2022-09-06 DIAGNOSIS — K64.4 EXTERNAL HEMORRHOIDS: ICD-10-CM

## 2022-09-06 LAB
ALBUMIN SERPL BCG-MCNC: 4.1 G/DL (ref 3.5–5.2)
ALP SERPL-CCNC: 136 U/L (ref 40–129)
ALT SERPL W P-5'-P-CCNC: 20 U/L (ref 10–50)
ANION GAP SERPL CALCULATED.3IONS-SCNC: 11 MMOL/L (ref 7–15)
AST SERPL W P-5'-P-CCNC: 30 U/L (ref 10–50)
BILIRUB SERPL-MCNC: 0.4 MG/DL
BUN SERPL-MCNC: 15 MG/DL (ref 8–23)
CALCIUM SERPL-MCNC: 9.8 MG/DL (ref 8.8–10.2)
CHLORIDE SERPL-SCNC: 106 MMOL/L (ref 98–107)
CREAT SERPL-MCNC: 0.79 MG/DL (ref 0.67–1.17)
D DIMER PPP FEU-MCNC: 1.78 UG/ML FEU (ref 0–0.5)
DEPRECATED HCO3 PLAS-SCNC: 21 MMOL/L (ref 22–29)
DIGOXIN SERPL-MCNC: 0.8 NG/ML (ref 0.6–2)
ERYTHROCYTE [DISTWIDTH] IN BLOOD BY AUTOMATED COUNT: 17.4 % (ref 10–15)
GFR SERPL CREATININE-BSD FRML MDRD: >90 ML/MIN/1.73M2
GLUCOSE SERPL-MCNC: 112 MG/DL (ref 70–99)
HCT VFR BLD AUTO: 33 % (ref 40–53)
HGB BLD-MCNC: 10.4 G/DL (ref 13.3–17.7)
INR PPP: 2.49 (ref 0.85–1.15)
LACTATE SERPL-SCNC: 1 MMOL/L (ref 0.7–2)
LDH SERPL L TO P-CCNC: 212 U/L (ref 0–250)
LVEF ECHO: NORMAL
MAGNESIUM SERPL-MCNC: 1.7 MG/DL (ref 1.7–2.3)
MCH RBC QN AUTO: 29.3 PG (ref 26.5–33)
MCHC RBC AUTO-ENTMCNC: 31.5 G/DL (ref 31.5–36.5)
MCV RBC AUTO: 93 FL (ref 78–100)
PLATELET # BLD AUTO: 308 10E3/UL (ref 150–450)
POTASSIUM SERPL-SCNC: 4.1 MMOL/L (ref 3.4–5.3)
PROT SERPL-MCNC: 7.7 G/DL (ref 6.4–8.3)
RBC # BLD AUTO: 3.55 10E6/UL (ref 4.4–5.9)
SARS-COV-2 RNA RESP QL NAA+PROBE: NEGATIVE
SODIUM SERPL-SCNC: 138 MMOL/L (ref 136–145)
WBC # BLD AUTO: 7.5 10E3/UL (ref 4–11)

## 2022-09-06 PROCEDURE — 80162 ASSAY OF DIGOXIN TOTAL: CPT

## 2022-09-06 PROCEDURE — 80053 COMPREHEN METABOLIC PANEL: CPT

## 2022-09-06 PROCEDURE — 85379 FIBRIN DEGRADATION QUANT: CPT

## 2022-09-06 PROCEDURE — 258N000003 HC RX IP 258 OP 636: Performed by: STUDENT IN AN ORGANIZED HEALTH CARE EDUCATION/TRAINING PROGRAM

## 2022-09-06 PROCEDURE — 93005 ELECTROCARDIOGRAM TRACING: CPT | Performed by: EMERGENCY MEDICINE

## 2022-09-06 PROCEDURE — C9803 HOPD COVID-19 SPEC COLLECT: HCPCS | Performed by: EMERGENCY MEDICINE

## 2022-09-06 PROCEDURE — 99285 EMERGENCY DEPT VISIT HI MDM: CPT | Mod: 25 | Performed by: EMERGENCY MEDICINE

## 2022-09-06 PROCEDURE — U0003 INFECTIOUS AGENT DETECTION BY NUCLEIC ACID (DNA OR RNA); SEVERE ACUTE RESPIRATORY SYNDROME CORONAVIRUS 2 (SARS-COV-2) (CORONAVIRUS DISEASE [COVID-19]), AMPLIFIED PROBE TECHNIQUE, MAKING USE OF HIGH THROUGHPUT TECHNOLOGIES AS DESCRIBED BY CMS-2020-01-R: HCPCS | Performed by: EMERGENCY MEDICINE

## 2022-09-06 PROCEDURE — 83615 LACTATE (LD) (LDH) ENZYME: CPT

## 2022-09-06 PROCEDURE — 93010 ELECTROCARDIOGRAM REPORT: CPT | Performed by: EMERGENCY MEDICINE

## 2022-09-06 PROCEDURE — 96360 HYDRATION IV INFUSION INIT: CPT | Performed by: EMERGENCY MEDICINE

## 2022-09-06 PROCEDURE — 258N000003 HC RX IP 258 OP 636

## 2022-09-06 PROCEDURE — 83735 ASSAY OF MAGNESIUM: CPT

## 2022-09-06 PROCEDURE — 250N000013 HC RX MED GY IP 250 OP 250 PS 637

## 2022-09-06 PROCEDURE — 85027 COMPLETE CBC AUTOMATED: CPT

## 2022-09-06 PROCEDURE — 250N000012 HC RX MED GY IP 250 OP 636 PS 637

## 2022-09-06 PROCEDURE — 93306 TTE W/DOPPLER COMPLETE: CPT

## 2022-09-06 PROCEDURE — 99223 1ST HOSP IP/OBS HIGH 75: CPT | Mod: 25 | Performed by: INTERNAL MEDICINE

## 2022-09-06 PROCEDURE — 93306 TTE W/DOPPLER COMPLETE: CPT | Mod: 26 | Performed by: INTERNAL MEDICINE

## 2022-09-06 PROCEDURE — 36415 COLL VENOUS BLD VENIPUNCTURE: CPT | Performed by: INTERNAL MEDICINE

## 2022-09-06 PROCEDURE — 96361 HYDRATE IV INFUSION ADD-ON: CPT | Performed by: EMERGENCY MEDICINE

## 2022-09-06 PROCEDURE — 83605 ASSAY OF LACTIC ACID: CPT | Performed by: INTERNAL MEDICINE

## 2022-09-06 PROCEDURE — 85610 PROTHROMBIN TIME: CPT

## 2022-09-06 PROCEDURE — 120N000003 HC R&B IMCU UMMC

## 2022-09-06 PROCEDURE — 36415 COLL VENOUS BLD VENIPUNCTURE: CPT

## 2022-09-06 RX ORDER — SERTRALINE HYDROCHLORIDE 100 MG/1
100 TABLET, FILM COATED ORAL DAILY
Status: DISCONTINUED | OUTPATIENT
Start: 2022-09-07 | End: 2022-09-15 | Stop reason: HOSPADM

## 2022-09-06 RX ORDER — HYDROCORTISONE 10 MG/G
CREAM TOPICAL 3 TIMES DAILY
Status: DISCONTINUED | OUTPATIENT
Start: 2022-09-06 | End: 2022-09-15 | Stop reason: HOSPADM

## 2022-09-06 RX ORDER — SODIUM CHLORIDE 9 MG/ML
INJECTION, SOLUTION INTRAVENOUS ONCE
Status: COMPLETED | OUTPATIENT
Start: 2022-09-06 | End: 2022-09-07

## 2022-09-06 RX ORDER — HYDROXYZINE HYDROCHLORIDE 25 MG/1
25 TABLET, FILM COATED ORAL EVERY 6 HOURS PRN
Status: DISCONTINUED | OUTPATIENT
Start: 2022-09-06 | End: 2022-09-15 | Stop reason: HOSPADM

## 2022-09-06 RX ORDER — ZOLPIDEM TARTRATE 5 MG/1
5 TABLET ORAL
Status: DISCONTINUED | OUTPATIENT
Start: 2022-09-06 | End: 2022-09-15 | Stop reason: HOSPADM

## 2022-09-06 RX ORDER — QUETIAPINE FUMARATE 25 MG/1
25 TABLET, FILM COATED ORAL 2 TIMES DAILY
Status: DISCONTINUED | OUTPATIENT
Start: 2022-09-06 | End: 2022-09-15 | Stop reason: HOSPADM

## 2022-09-06 RX ORDER — MYCOPHENOLATE MOFETIL 500 MG/1
1500 TABLET ORAL 2 TIMES DAILY
Status: DISCONTINUED | OUTPATIENT
Start: 2022-09-06 | End: 2022-09-15 | Stop reason: HOSPADM

## 2022-09-06 RX ORDER — PROMETHAZINE HYDROCHLORIDE 12.5 MG/1
12.5 TABLET ORAL EVERY 6 HOURS PRN
Status: DISCONTINUED | OUTPATIENT
Start: 2022-09-06 | End: 2022-09-15 | Stop reason: HOSPADM

## 2022-09-06 RX ORDER — LANOLIN ALCOHOL/MO/W.PET/CERES
3 CREAM (GRAM) TOPICAL
Status: DISCONTINUED | OUTPATIENT
Start: 2022-09-06 | End: 2022-09-15 | Stop reason: HOSPADM

## 2022-09-06 RX ORDER — MULTIPLE VITAMINS W/ MINERALS TAB 9MG-400MCG
1 TAB ORAL DAILY
Status: DISCONTINUED | OUTPATIENT
Start: 2022-09-07 | End: 2022-09-15 | Stop reason: HOSPADM

## 2022-09-06 RX ORDER — ACETAMINOPHEN 325 MG/1
975 TABLET ORAL EVERY 8 HOURS
Status: DISCONTINUED | OUTPATIENT
Start: 2022-09-06 | End: 2022-09-15 | Stop reason: HOSPADM

## 2022-09-06 RX ORDER — DIGOXIN 125 MCG
125 TABLET ORAL DAILY
Status: DISCONTINUED | OUTPATIENT
Start: 2022-09-07 | End: 2022-09-15 | Stop reason: HOSPADM

## 2022-09-06 RX ORDER — SODIUM CHLORIDE 9 MG/ML
INJECTION, SOLUTION INTRAVENOUS CONTINUOUS
Status: DISCONTINUED | OUTPATIENT
Start: 2022-09-06 | End: 2022-09-06

## 2022-09-06 RX ORDER — ATORVASTATIN CALCIUM 40 MG/1
40 TABLET, FILM COATED ORAL EVERY EVENING
Status: DISCONTINUED | OUTPATIENT
Start: 2022-09-06 | End: 2022-09-15 | Stop reason: HOSPADM

## 2022-09-06 RX ORDER — QUETIAPINE FUMARATE 50 MG/1
150 TABLET, FILM COATED ORAL AT BEDTIME
Status: DISCONTINUED | OUTPATIENT
Start: 2022-09-06 | End: 2022-09-15 | Stop reason: HOSPADM

## 2022-09-06 RX ORDER — HYDROXYCHLOROQUINE SULFATE 200 MG/1
200 TABLET, FILM COATED ORAL 2 TIMES DAILY
Status: DISCONTINUED | OUTPATIENT
Start: 2022-09-06 | End: 2022-09-15 | Stop reason: HOSPADM

## 2022-09-06 RX ORDER — PANTOPRAZOLE SODIUM 40 MG/1
40 TABLET, DELAYED RELEASE ORAL DAILY
Status: DISCONTINUED | OUTPATIENT
Start: 2022-09-07 | End: 2022-09-15 | Stop reason: HOSPADM

## 2022-09-06 RX ADMIN — ATORVASTATIN CALCIUM 40 MG: 40 TABLET, FILM COATED ORAL at 21:59

## 2022-09-06 RX ADMIN — SODIUM CHLORIDE 1000 ML: 9 INJECTION, SOLUTION INTRAVENOUS at 18:35

## 2022-09-06 RX ADMIN — HYDROXYCHLOROQUINE SULFATE 200 MG: 200 TABLET, FILM COATED ORAL at 21:59

## 2022-09-06 RX ADMIN — Medication 150 MG: at 22:01

## 2022-09-06 RX ADMIN — ACETAMINOPHEN 975 MG: 325 TABLET, FILM COATED ORAL at 18:49

## 2022-09-06 RX ADMIN — Medication 3 MG: at 21:59

## 2022-09-06 RX ADMIN — SODIUM CHLORIDE: 9 INJECTION, SOLUTION INTRAVENOUS at 15:50

## 2022-09-06 RX ADMIN — MYCOPHENOLATE MOFETIL 1500 MG: 500 TABLET, FILM COATED ORAL at 21:59

## 2022-09-06 ASSESSMENT — ENCOUNTER SYMPTOMS
VOICE CHANGE: 0
TROUBLE SWALLOWING: 0
BRUISES/BLEEDS EASILY: 1
HEADACHES: 0
WEAKNESS: 0
SORE THROAT: 0

## 2022-09-06 ASSESSMENT — ACTIVITIES OF DAILY LIVING (ADL)
ADLS_ACUITY_SCORE: 37
ADLS_ACUITY_SCORE: 39

## 2022-09-06 NOTE — LETTER
Transition Communication Hand-off for Care Transitions to Next Level of Care Provider    Name: Dandy Sands  : 1960  MRN #: [id number]  Primary Care Provider: Scar Jeffrey     Primary Clinic: [facility] [address]  [place] [address]     Reason for Hospitalization:  Acute diarrhea [R19.7]  Complication involving left ventricular assist device (LVAD) [T82.9XXA]  Complication involving left ventricular assist device (LVAD), initial encounter [T82.9XXA]  Admit Date/Time: 2022  4:47 PM  Discharge Date: 9/15/22  Payor Source: Payor: [facility] / Plan: [employer] TRANSPLANT / Product Type: Indemnity /     Reason for Communication Hand-off Referral: Other continuity of care    Discharge Plan: disharge with 24 hr family support, outpatient cardiac rehab, resuming home care servies    Concern for non-adherence with plan of care:   Y/N no  Discharge Needs Assessment:  Needs    Flowsheet Row Most Recent Value   Equipment Currently Used at Home --  [LVAD]        Follow-up specialty is recommended: Yes    Follow-up plan:    Future Appointments   Date Time Provider Department Center   2022  7:45 AM  [facility]   2022  8:00 AM Kelly Lora MD [facility]   2022  1:30 PM Darius Zamora MD [facility]   10/7/2022  1:30 PM  [facility]   10/7/2022  2:00 PM [facility] PFL 6 MINUTE WALK 1 [facility]   10/7/2022  2:30 PM [facility]   10/7/2022  3:00 PM Kelly Lora MD [facility]   2023 12:00 PM  [facility]   2023 12:30 PM UC PFL 6 MINUTE WALK 1 [facility]   2023  1:00 PM [facility] 1 [facility]   2023  1:30 PM [facility]   2023  2:30 Kelly Nobles MD [facility]       Any outstanding tests or procedures:        Referrals     Future Labs/Procedures    CARDIAC REHAB REFERRAL     Comments:    Please be aware that coverage of these services is subject to the terms and  limitations of your health insurance plan. Call member services at your health plan with any benefit or coverage questions.    Order is sent electronically to [facility] scheduling. Call [phone] if you haven't been contacted regarding these appointments within 2 business days of discharge.  If you have not heard from the scheduling office within 2 business days, please call [phone] for [facility], [phone] for [place] and [phone] for [place].    Home Care Referral     Comments:    [facility]  Phone  [phone]  Fax  [phone]    [facility] to resume home care orders. Nurse to see for medication management, INR lab draws, disease management and caregiver training.     Next INR needed on 9/16.     Your provider has ordered home health services. If you have not been contacted within 2 days of your discharge please call the inpatient department phone number at [phone] .            [facility] Recommendations:  Please see attached AVS    Susan Gabriel RN    AVS/Discharge Summary is the source of truth; this is a helpful guide for improved communication of patient story

## 2022-09-06 NOTE — H&P
[facility]  Cardiology History and Physical - Cardiology    Date of Admission:  9/6/2022    Assessment & Plan: MARISOL Dorman EDUARD Sands is a 61 year old male admitted on 9/5/2022. He is a 61M with pmhx of SLE, antiphospholipid syndrome, hx of history pulmonary embolism (on anticoagulation with warfarin), HFrEF due to ICM, HLD. He initially presented to [facility] on 6/13 due to nausea, vomiting, abdominal bloating and transferred to [facility] for admission on 6/17/2022 for decompensated heart failure 2/2 cardiogenic shock c/b multiorgan failure requiring IABP, is s/p HM 3 LVAD on 7/8/2022. Hospitalization was also c/b RV, had 29F Protek duo via RIJ, RVAD removed 7/21, hemopericardium requiring repeat washout, chest was closed 7/13. He then developed epistaxis on 8/6 ENT. Patient has h/o nasal perforation that required elective procedure (in [place]) which was postponed due to LVAD insertion.  He required intubation 8/7 for airway protection, s/p extubated 8/8. Nasal pack removed 8/9. Patient recently discharged on 09/05 for epistaxis after 1h after taking warfarin.  Now admitted for worsening loose stools up to 5/day without bleeding but presence of mucus. He also has developed more low PI events.     # Diarrheas  # Low PI events  1-week of diarrheas x5/day. Has had low PI events over the past 24 hrs (~2.1-2.4) No mucus or bleeding. Denies fever, night sweats, or abdominal pain. Afebrile on admission. Physical exam with dry mucous membranes. Given 500cc NaCl 0.9%. Given 1L over 4hrs in floor.    - 1L NaCl 0.9% over 4h  - Enteropathogen panel ordered  - Cdiff ordered given recent multiple hospitalizations  - Holding PTA Hydralazine 37.5 TID + Lisinopril 2.5 qday given soft MAP      #HFrEF 2/2 ICM  in setting of cardiogenic shock (SCAI D) s/p HM3 LVAD  #RV failure s/p Protek duo temporary RVAD s/p decannulation 7/21  #RV thrombus  #Post chest closure  hemopericardium s/p repeat washout (7/12)  #PMH CAD s/p PCI to LAD (2005)  #S/p CRT-D (2006)  Patient with ICM s/p HM3 LVAD 7/8/22 2/2 cardiogenic shock requiring MCS with IABP as prior to HM (initially evaluated for heart transplant, however noted to have substantially elevated DSAs during course of care, so decision made to proceed with LVAD given patient was already on temporary MCS). Intraoperative course during LVAD placement was c/b RVF resulting in cardiogenic shock requiring high dose pressors necessitating temporary RVAD support. Initial post-op course c/b hemopericardium requiring repeat washout with chest left open, with good recovery extubation and decannulation on 7/21. Patient tolerating hemodynamics and end organ standpoint well. He has had intermittent low flow alarms with high PI, no power spike, and a closed aortic valve on TTE. TTE also showed underfilling LV which could explain his low flow alarms d/t suckdown event so his LVAD speed was reduced to 5200. Patient did have a low flow alarm on 8/20 @ 1145 (PI 8-9 and not hypotensive); CTA chest done which showed that the LVAD outflow tract is widely patent. Patient is euvolemic on exam, but at times has had orthostasis symptoms; patient encouraged to stay hydrated within his fluid/diet restriction guidelines (no more than 2L/day of fluid intake and no more than 3g of Na per day in dietary intake).     - interrogation showed 2 low flow alarms in the past 24 hrs before epistaxis (has had at least 6 low flow alarms over the past 5 days, has hx of hemorrhoidal bleeding x1 and soft stools)              > 500cc NaCl 0.9% over 2 hrs x1   > 1L NaCl 0.9% over 4hrs   - Volume status: Euvolemic              > Diuretics: Not needed at this time  - Afterload reduction: MAP goal 65-80; Holding Hydralazine 37.5 TID + Lisinopril 2.5 qday  - Statin: Atorvastatin 40mg  - BB: Holding in setting of recent cardiogenic shock and orthostatic symptoms, consider restarting  outpatient  - AA: Holding in setting of recent cardiogenic shock and orthostatic symptoms, consider restarting outpatient  - SGLTi: As outpatient, has been held due to immunosuppression he is on for his SLE.  - Anticoagulation: Continue PTA Warfarin. At goal INR 2.64 (INR 2-3)  - Antiplatelets: Continue PTA ASA 81mg qday.   -    - Continue PTA ASA 81 mg qday     #Epistaxis, improved  # Hx Intubated due to Concern for airway protection (8/7)-Extubated (8/8)  # Mild-moderate emphysema  # History of COVID-19  # Iatrogenic R apical PTX  H/o nasal perforation  For elective procedure (in [place]), postponed 2/2 LVAD insertion. Epistaxis not fixed by packing overnight on 8/6 and pt continued to bleed. Intubated for airway protection. Controlled at bedside by ENT s/p cautery and packing. Extubated 8/8. Had Cefepime/Vanc empirically for broad coverage for PNA (8/7-8/12). Patient removed his own nasal packing overnight on 8/9. Now admitted for profuse bleeding 1h after taking warfarin. ENT performed nasal package ~2h.  - Holding PTA Ayr nasal saline gel at bedtime + nasal saline q4H given ongoing nasal package  - Follow with Dr Vazquez (EN) in 2 weeks   - Maintain nasal package ~6h     #Fall   #Insomnia  Patient had a fall this AM, slipped and fell on his buttocks. Did not hit his head or LOC. Has had at least 1 episodes in prior hospitalizations with CT head unremarkable (8/7). Denied lightheadedness, dizziness  - Continue PTA Sertraline and Quetiapine   - Melatonin 10mg qday  - Fall precautions  - Orthostatic ordered       #GERD  #Hx of Hematemesis / oral mucosal bleeding   #Dysphagia  GI consulted 7/31 for black tarry stools and a possible GI bleed, however it is more likely swallowed blood from epistaxis that patient previously had. GI did not recommend an upper endoscopy. Recurrence of bloody stools likely due to epistaxis which have resolved. Hemoglobin was monitored and continued to be stable throughout the  rest of the hospitalization. Cleared for regular diet by SLP.  - Na 2g diet      #Anemia due to blood loss from Epistaxis, stable  #History of DVT and PE   #Antiphospholipid antibody syndrome  #History of iron deficiency anemia  Hb stable 11.1  - Transfuse if Hb<7 or actively bleeding  - Trend Hb     #SLE  - Holding PTA meds: hydroxychloroquine 200mg (held before and resumed 7/16)       Diet:  Na 2g diet  DVT Prophylaxis: Ambulate every shift  Fitzgerald Catheter: Not present  Code Status: Full Code  Fluids: NaCl 0.9% 500cc, followed by   Lines: PIV     Disposition Plan   Expected discharge: 2 - 3 days, recommended to prior living arrangement once fluid volume status optimized on oral medication.    Entered: Keily Bowles MD 09/06/2022, 5:27 PM     The patient's care was discussed with the Attending Physician, Dr. Askew.    Kristie Bowles   Internal Medicine PGY-2  ________________________________________________    Chief Complaint   Loose stools    History is obtained from the patient    History of Present Illness   Dandy Sands is a 61 year old male admitted on 9/4/2022. He is a 61M with pmhx of SLE, antiphospholipid syndrome, hx of history pulmonary embolism (on anticoagulation with warfarin), HFrEF due to ICM, HLD. He initially presented to [facility] on 6/13 due to nausea, vomiting, abdominal bloating and transferred to [facility] for admission on 6/17/2022 for decompensated heart failure 2/2 cardiogenic shock c/b multiorgan failure requiring IABP, is s/p HM 3 LVAD on 7/8/2022. Hospitalization was also c/b RV, had 29F Protek duo via RIJ, RVAD removed 7/21, hemopericardium requiring repeat washout, chest was closed 7/13. He then developed epistaxis on 8/6 ENT. Patient has h/o nasal perforation that required elective procedure (in [place]) which was postponed due to LVAD insertion.  He required intubation 8/7 for airway protection, s/p extubated 8/8. Nasal pack removed 8/9. Now admitted for  epistaxis rebleeding after being discharged from ARU this AM.     Patient recently discharged on 09/05 for epistaxis after 1h after taking warfarin. He was recently discharge this AM from ARU on 09/05. He actually was going to be discharged on 09/02 but developed hemorrhoidal bleeding, small amount needing to stay in ARU for monitoring. Since then, he had some soft stools, today at least 3 without bleeding. Denies fever, night sweats, chills, abdominal pain, melena, hematemesis, hemoptysis, spontaneous bruises. Has had low PI events for the past week one every other day until 3 days ago, and then at least 2 daily. Now admitted for worsening loose stools up to 5/day without bleeding but presence of mucus. He also has developed more low PI events.      Review of Systems    The 10 point Review of Systems is negative other than noted in the HPI or here.     Past Medical History    I have reviewed this patient's medical history and updated it with pertinent information if needed.   Past Medical History:   Diagnosis Date     Antiphospholipid antibody syndrome (H)      CAD (coronary artery disease)      Chronic systolic heart failure (H)      Ischemic cardiomyopathy      Mitral regurgitation      Systemic lupus erythematosus (H)        Past Surgical History   I have reviewed this patient's surgical history and updated it with pertinent information if needed.  Past Surgical History:   Procedure Laterality Date     COLONOSCOPY N/A 05/23/2022    Procedure: COLONOSCOPY;  Surgeon: Parth Meneses MD;  Location: [facility] OR     CV CORONARY ANGIOGRAM N/A 05/24/2022    Procedure: Coronary Angiogram;  Surgeon: Mike Pope MD;  Location:  HEART CARDIAC CATH LAB     CV CORONARY ANGIOGRAM N/A 05/29/2022    Procedure: Coronary Angiogram;  Surgeon: Austin Bright MD;  Location: [facility] CARDIAC CATH LAB     CV CORONARY LITHOTRIPSY PCI Bilateral 05/24/2022    Procedure: Percutaneous Coronary Intervention - Lithotripsy;   Surgeon: Mike Pope MD;  Location:  HEART CARDIAC CATH LAB     CV INTRA AORTIC BALLOON N/A 06/17/2022    Procedure: Intraprocedure Aortic Balloon Pump Insertion;  Surgeon: Sherman Cerda MD;  Location:  HEART CARDIAC CATH LAB     CV INTRA AORTIC BALLOON Right 07/03/2022    Procedure: Reposition of Subclavian Intraprocedure Aortic Balloon Pump;  Surgeon: Austin Bright MD;  Location:  HEART CARDIAC CATH LAB     CV INTRAVASULAR ULTRASOUND N/A 05/24/2022    Procedure: Intravascular Ultrasound;  Surgeon: Mike Pope MD;  Location:  HEART CARDIAC CATH LAB     CV PCI ANGIOPLASTY N/A 05/29/2022    Procedure: Percutaneous Transluminal Angioplasty;  Surgeon: Austin Bright MD;  Location:  HEART CARDIAC CATH LAB     CV PCI STENT DRUG ELUTING N/A 05/24/2022    Procedure: Percutaneous Coronary Intervention Stent;  Surgeon: Mike Pope MD;  Location:  HEART CARDIAC CATH LAB     CV RIGHT HEART CATH MEASUREMENTS RECORDED N/A 05/18/2022    Procedure: Right Heart Catheterization;  Surgeon: Justo Stewart MD;  Location:  HEART CARDIAC CATH LAB     CV RIGHT HEART CATH MEASUREMENTS RECORDED N/A 05/24/2022    Procedure: Right Heart Catheterization;  Surgeon: Mike Pope MD;  Location:  HEART CARDIAC CATH LAB     CV RIGHT HEART CATH MEASUREMENTS RECORDED N/A 05/29/2022    Procedure: Right Heart Catheterization;  Surgeon: Austin Bright MD;  [facility]     CV RIGHT HEART CATH MEASUREMENTS RECORDED N/A 07/01/2022    Procedure: Right Heart Catheterization;  Surgeon: Mike Pope MD;  Location:  HEART CARDIAC CATH LAB     CV RIGHT HEART EXERCISE STRESS STUDY N/A 05/18/2022    Procedure: Stress Drug Study;  Surgeon: Justo Stewart MD;  Location:  HEART CARDIAC CATH LAB     CV SWAN CHOCO PROCEDURE N/A 06/17/2022    Procedure: [place] Choco Procedure;  Surgeon: Sherman Cerda MD;  Location:  HEART CARDIAC CATH LAB     INCISION AND DRAINAGE  CHEST WASHOUT, COMBINED N/A 07/11/2022    Procedure: Chest washout and closure;  Surgeon: Darnell Morgan MD;  Location: [facility] OR     INCISION AND DRAINAGE CHEST WASHOUT, COMBINED N/A 07/12/2022    Procedure: INCISION AND DRAINAGE, WOUND, CHEST, WITH IRRIGATION;  Surgeon: Darius Zamora MD;  Location: [facility] OR     INSERT ARTERIAL LINE  07/18/2022          INSERT INTRAAORTIC BALLOON PUMP Right 06/23/2022    Procedure: INSERTION, INTRA-AORTIC BALLOON PUMP RIGHT SUBCLAVIAN ARTERY.;  Surgeon: Hayden Veras MD;  Location: [facility] OR     INSERT VENTRICULAR ASSIST DEVICE LEFT (HEARTMATE II/III) MINIMALLY INVASIVE N/A 07/08/2022    Procedure: MEDIAN STERNOTOMY.  CARDIOPULMONARY BYPASS.  INTRAOPERATIVE TRANSESOPHAGEAL ECHOCARDIOGRAM.  IMPLANT LEFT VENTRICULAR ASSIST DEVICE HEARTMATE III.  PLACEMENT OF PERCUTANEOUS RIGHT VENTRICULAR ASSIST DEVICE.  TEMPORARY CHEST CLOSURE;  Surgeon: Darius Zamora MD;  Location: [facility] OR     IR CHEST TUBE PLACEMENT NON-TUNNELED RIGHT  08/01/2022     IRRIGATION AND DEBRIDEMENT CHEST WASHOUT, COMBINED N/A 07/13/2022    Procedure: IRRIGATION AND DEBRIDEMENT, CHEST;  Surgeon: Darius Zamoar MD;  Location: [facility] OR     PICC DOUBLE LUMEN PLACEMENT Right 05/28/2022    right basilic 5 fr dl picc 40 cm     PICC DOUBLE LUMEN PLACEMENT Right 08/15/2022    Right Lateral, 41 total length, 3 cm external length       Social History   I have reviewed this patient's social history and updated it with pertinent information if needed.     Family History   I have reviewed this patient's family history and updated it with pertinent information if needed.     No significant family history of cardiovascular disease    Prior to Admission Medications   Prior to Admission Medications   Prescriptions Last Dose Informant Patient Reported? Taking?   Melatonin 10 MG TABS tablet   No No   Sig: Take 1 tablet (10 mg) by mouth At Bedtime for 30 days   QUEtiapine (SEROQUEL) 25 MG tablet   No No   Sig: Take 1 tablet  (25 mg) by mouth 2 times daily for 30 days   QUEtiapine (SEROQUEL) 50 MG tablet   No No   Sig: Take 3 tablets (150 mg) by mouth At Bedtime for 30 days   acetaminophen (TYLENOL) 325 MG tablet   No No   Sig: Take 3 tablets (975 mg) by mouth every 8 hours for 30 days   atorvastatin (LIPITOR) 40 MG tablet   No No   Sig: Take 1 tablet (40 mg) by mouth every evening for 30 days   digoxin (LANOXIN) 125 MCG tablet   No No   Sig: Take 1 tablet (125 mcg) by mouth daily for 30 days   fluticasone-vilanterol (BREO ELLIPTA) 100-25 MCG/INH inhaler   No No   Sig: Inhale 1 puff into the lungs daily for 30 days   hydrALAZINE (APRESOLINE) 50 MG tablet   No No   Sig: Take 1 tablet (50 mg) by mouth 3 times daily for 30 days   hydrOXYzine (ATARAX) 25 MG tablet   No No   Sig: Take 1 tablet (25 mg) by mouth every 6 hours as needed for anxiety (ANXIETY)   hydrocortisone 1 % CREA cream   No No   Sig: Place rectally 3 times daily for 2 days   hydroxychloroquine (PLAQUENIL) 200 MG tablet   Yes No   Sig: Take 200 mg by mouth 2 times daily   lisinopril (ZESTRIL) 10 MG tablet   No No   Sig: Take 1 tablet (10 mg) by mouth daily for 30 days   melatonin 3 MG tablet   Yes No   Sig: Take 6 mg by mouth nightly as needed for sleep   multivitamin w/minerals (THERA-VIT-M) tablet   No No   Sig: Take 1 tablet by mouth daily   mycophenolate (GENERIC EQUIVALENT) 500 MG tablet   No No   Sig: Take 3 tablets (1,500 mg) by mouth 2 times daily for 30 days   pantoprazole (PROTONIX) 40 MG EC tablet   No No   Sig: Take 1 tablet (40 mg) by mouth daily   pramox-pe-glycerin-petrolatum (PREPARATION H) 1-0.25-14.4-15 % CREA cream   No No   Sig: Place rectally 2 times daily as needed for hemorrhoids Apply immediately after a bowel movement.   promethazine (PHENERGAN) 12.5 MG tablet   No No   Sig: Take 1 tablet (12.5 mg) by mouth every 6 hours as needed for nausea or vomiting   sertraline (ZOLOFT) 100 MG tablet   No No   Sig: Take 1 tablet (100 mg) by mouth daily for 30  days   thiamine (B-1) 100 MG tablet   No No   Sig: Take 1 tablet (100 mg) by mouth daily   warfarin ANTICOAGULANT (COUMADIN) 2.5 MG tablet   No No   Sig: Take 2 tabs (5mg) daily at bedtime. Future dose adjustments pending INR   zolpidem (AMBIEN) 5 MG tablet   Yes No   Sig: Take 5 mg by mouth nightly as needed for sleep      Facility-Administered Medications: None     Allergies   No Known Allergies    Physical Exam   Vital Signs: Temp: 97.7  F (36.5  C) Temp src: Temporal BP: (!) 80/50 (lvad) Pulse: 61   Resp: 16 SpO2: 98 % O2 Device: None (Room air)    Weight: 130 lbs 0 oz    Constitutional: awake, alert, cooperative, no apparent distress, and appears stated age  Hematologic / Lymphatic: no cervical lymphadenopathy and no supraclavicular lymphadenopathy  Respiratory: No increased work of breathing, good air exchange, clear to auscultation bilaterally, no crackles or wheezing  Cardiovascular: Normal apical impulse, regular rate and rhythm, normal S1 and S2, no S3 or S4, and no murmur noted  GI: No scars, normal bowel sounds, soft, non-distended, non-tender, no masses palpated, no hepatosplenomegally  Musculoskeletal: There is no redness, warmth, or swelling of the joints.  Full range of motion noted.  Motor strength is 5 out of 5 all extremities bilaterally.  Tone is normal.  Neurologic: Awake, alert, oriented to name, place and time.  Cranial nerves II-XII are grossly intact.  Motor is 5 out of 5 bilaterally.  Cerebellar finger to nose, heel to shin intact.  Sensory is intact.  Babinski down going, Romberg negative, and gait is normal.    Data   Data reviewed today: I reviewed all medications, new labs and imaging results over the last 24 hours. I personally reviewed Cardiology specific data review: the EKG tracing showing no ischemic ST/T changes. Sinus tachycardia     Recent Labs   Lab 09/06/22  1333 09/05/22  0617 09/04/22  2333 09/04/22  2234 09/04/22  0601   WBC 7.5 7.0  --  6.7  --    HGB 10.4* 9.4*  --  11.1*  11.1*   MCV 93 98  --  92  --     235  --  307  --    INR 2.49*  --  2.64*  --  2.69*    135*  --  137  --    POTASSIUM 4.1 4.1  --  4.2  --    CHLORIDE 106 105  --  106  --    CO2 21* 18*  --  16*  --    BUN 15.0 24.2*  --  17.6  --    CR 0.79 0.50*  --  0.59*  --    ANIONGAP 11 12  --  15  --    ELDA 9.8 9.1  --  9.5  --    * 97  --  112*  --    ALBUMIN 4.1  --   --  3.8  --    PROTTOTAL 7.7  --   --  7.7  --    BILITOTAL 0.4  --   --  0.4  --    ALKPHOS 136*  --   --  133*  --    ALT 20  --   --  15  --    AST 30  --   --  42  --      Most Recent 3 CBC's:Recent Labs   Lab Test 09/06/22 1333 09/05/22 0617 09/04/22  2234   WBC 7.5 7.0 6.7   HGB 10.4* 9.4* 11.1*   MCV 93 98 92    235 307     Most Recent 3 BMP's:Recent Labs   Lab Test 09/06/22 1333 09/05/22  0617 09/04/22  2234    135* 137   POTASSIUM 4.1 4.1 4.2   CHLORIDE 106 105 106   CO2 21* 18* 16*   BUN 15.0 24.2* 17.6   CR 0.79 0.50* 0.59*   ANIONGAP 11 12 15   ELDA 9.8 9.1 9.5   * 97 112*     Most Recent 2 LFT's:Recent Labs   Lab Test 09/06/22  1333 09/04/22  2234   AST 30 42   ALT 20 15   ALKPHOS 136* 133*   BILITOTAL 0.4 0.4     Most Recent 3 INR's:Recent Labs   Lab Test 09/06/22  1333 09/04/22  2333 09/04/22  0601   INR 2.49* 2.64* 2.69*     Anticoagulation Dose History     Recent Dosing and Labs Latest Ref Rng & Units 8/31/2022 9/1/2022 9/2/2022 9/3/2022 9/4/2022 9/4/2022 9/6/2022    Warfarin 3 mg - 6 mg - - - - - -    Warfarin 5 mg - - - 5 mg - - - -    Warfarin 7.5 mg - - 7.5 mg - 7.5 mg - - -    [id number] 70 - 130 % - - - - - - -    INR 0.85 - 1.15 2.53(H) 2.25(H) 2.56(H) 2.64(H) 2.69(H) 2.64(H) 2.49(H)          Most Recent 3 Troponin's:No lab results found.  Most Recent 3 BNP's:Recent Labs   Lab Test 09/04/22  2234 06/17/22  1746 05/27/22  2215   NTBNPI 928* 4,611* 2,006*     Most Recent D-dimer:Recent Labs   Lab Test 09/06/22  1333   DD 1.78*     Most Recent Cholesterol Panel:Recent Labs   Lab Test  22  0331 22  0337 22  0334   CHOL  --   --  79   LDL  --   --  38   HDL  --   --  30*   TRIG 110   < > 53    < > = values in this interval not displayed.     Most Recent Hemoglobin A1c:Recent Labs   Lab Test 22  0516   A1C 5.5     Most Recent 6 glucoses:Recent Labs   Lab Test 22  1333 22  0617 22  2234 22  0606 22  0804 22  0816   * 97 112* 94 104* 101*     Most Recent Urinalysis:Recent Labs   Lab Test 22  1542   COLOR Yellow   APPEARANCE Clear   URINEGLC Negative   URINEBILI Negative   URINEKETONE Negative   SG 1.014   UBLD Negative   URINEPH 5.5   PROTEIN 30 *   NITRITE Negative   LEUKEST Negative   RBCU <1   WBCU 4     Most Recent ESR & CRP:Recent Labs   Lab Test 22  2234 22  0606 22  1522   SED  --   --  39*   CRP 13.50*   < > 28.0*    < > = values in this interval not displayed.     Most Recent Anemia Panel:Recent Labs   Lab Test 22  1333 22  1711 22  1005   WBC 7.5   < >  --    HGB 10.4*   < >  --    HCT 33.0*   < >  --    MCV 93   < >  --       < >  --    IRON  --   --  42*   IRONSAT  --   --  16   FEB  --   --  260   SHAWN  --   --  365    < > = values in this interval not displayed.     Recent Results (from the past 24 hour(s))   Echocardiogram Complete   Result Value    LVEF  10-15% (severely reduced)    [facility]    [id number]  [id number]  [id number]  [id number]^ZELDA^ADRIÁN^MARCUS     [facility]  Echocardiography Laboratory  [address] [place] [id number]     Name: OLEG KAUR  MRN: [id number]  : 1960  Study Date: 2022 01:44 PM  Age: 61 yrs  Gender: Male  Patient Location: [facility]  Reason For Study: Chronic systolic congestive heart failure (H), LVAD (left  ventri  Ordering Physician: ADRIÁN NDIAYE  Referring Physician: ADRIÁN NDIAYE  Performed By: Chantal Malin     BSA: 1.7 m2  Height: 67 in  Weight: 130  lb  ______________________________________________________________________________  ______________________________________________________________________________  Interpretation Summary  HM3 at 5200 rpm.  At baseline -  Left ventricular function is decreased. The ejection fraction is 10-15%  (severely reduced). Septum is midline.  LVAD cannula was seen in the expected anatomic position in the LV apex.  Mild right ventricular dilation is present. Global right ventricular function  is mildly reduced.  The AV remains closed throughout the cardiac cycle. There is mild, continuous  aortic regurgitation.  The inferior vena cava was normal in size with preserved respiratory  variability.  No pericardial effusion is present.     Please refer to the EPIC report for measurements performed at different LVAD  speed settings.  ______________________________________________________________________________  Left Ventricle  Left ventricular function is decreased. The ejection fraction is 10-15%  (severely reduced). Please refer to the EPIC report for measurements performed  at different LVAD speed settings. LVAD cannula was seen in the expected  anatomic position in the LV apex. Septum is midline.     Right Ventricle  Mild right ventricular dilation is present. Global right ventricular function  is mildly reduced.     Aortic Valve  The AV remains closed throughout the cardiac cycle. There is mild, continuous  aortic regurgitation.     Tricuspid Valve  Mild tricuspid insufficiency is present.     Vessels  The inferior vena cava was normal in size with preserved respiratory  variability.     Pericardium  No pericardial effusion is present.  ______________________________________________________________________________  MMode/2D Measurements & Calculations  IVSd: 0.88 cm  LVIDd: 3.9 cm  LVIDs: 3.7 cm  LVPWd: 0.99 cm  FS: 3.1 %  LV mass(C)d: 109.2 grams  LV mass(C)dI: 64.9 grams/m2  RWT: 0.52      ______________________________________________________________________________  Report approved by: Nakia Gaines 09/06/2022 02:57 PM

## 2022-09-06 NOTE — PROGRESS NOTES
ANTICOAGULATION  MANAGEMENT: Discharge Review    Dandy Sands chart reviewed for anticoagulation continuity of care    Emergency room visit on 9/4-9/5 for Epitaxsis.    TCU Admission on 8/21-9/4 for Admission after VAD Implantation.    Discharge disposition: Staying locally at [facility]     Results:    Recent labs: (last 7 days)     08/31/22  0637 09/01/22  0606 09/02/22  0606 09/03/22  0607 09/04/22  0601 09/04/22  2333   INR 2.53* 2.25* 2.56* 2.64* 2.69* 2.64*     Anticoagulation inpatient management:     See calendar    Anticoagulation discharge instructions:     Warfarin dosing: Per chart review and ED visit changed Warfarin dosing to 5mg daily    Bridging: No   INR goal change: No      Medication changes affecting anticoagulation: Yes: ASA 81mg Daily was discontinue given recurrent severe epitaxis     Additional factors affecting anticoagulation: Yes: Per chart review has had previous epitaxis. Also was discharged on Ensure, but per chart review this causes patient diarrhea.    ENT packet Lft nare. Will start nasal saline on Tuesday/     PLAN     No adjustment to anticoagulation plan needed    Patient not contacted    Anticoagulation Calendar updated    Riri Giron RN

## 2022-09-06 NOTE — TELEPHONE ENCOUNTER
Patient can be seen this Friday (9/9/22) with Dr. Mae at 8am or 1pm. Ok'd per clinic, if not route back to us and clinic will find a spot to schedule him.

## 2022-09-06 NOTE — ED TRIAGE NOTES
3 days diarrhea  No pain  Nausea but no vomiting  Today in clinic for low flow alarms with heartmate 3

## 2022-09-06 NOTE — PROGRESS NOTES
[facility]    Background: Transitional Care Management program auto-identified and prompting a chart review by [facility] team.    Assessment: Upon chart review, [facility] Team member will cancel/close this episode of Transitional Care Management program due to reason below:    Patient has a follow up appointment with an appropriate provider today for hospital discharge     Patient has several follow-up appointments today for hospital discharge and follow-up including: Labs, ECHO, EKG, 8-week post implant for VAD check, and a Cardiac Device check-in. No W outreach call needed at this time.     Plan: Transitional Care Management episode closed per reason above.      JAXON Brooks  [phone]  [facility]    *Connected Care Resource Team does NOT follow patient ongoing. Referrals are identified based on internal discharge reports and the outreach is to ensure patient has an understanding of their discharge instructions.

## 2022-09-06 NOTE — ED PROVIDER NOTES
[place] EMERGENCY DEPARTMENT ([facility])  9/06/22 ED 24  History     Chief Complaint   Patient presents with     Dehydration     The history is provided by the patient and medical records.     Dandy Sands is a 61 year old male with complex past medical history including systemic lupus erythematosus on Plaquenil, CAD, chronic systolic heart failure s/p LVAD placement, antiphospholipid antibody syndrome who presents with nausea, diarrhea for the past 3 days as well as low flow alarms in clinic today.  He had  a recent ED visit on 9/4-9/5 for epistaxis.  He had follow-up in cardiology clinic today with LVAD coordinator for speed optimization echo who noted that he was experiencing low flow alarms, weakness and fatigue to point where he was nodding off after speaking with the coordinator and falling asleep during the study. He was sent here for further evaluation.  Here patient endorses fatigue, nausea and diarrhea for the past 3 days. No other symptoms noted.      PAST MEDICAL HISTORY:   Past Medical History:   Diagnosis Date     Antiphospholipid antibody syndrome (H)      CAD (coronary artery disease)      Chronic systolic heart failure (H)      Ischemic cardiomyopathy      Mitral regurgitation      Systemic lupus erythematosus (H)        PAST SURGICAL HISTORY:   Past Surgical History:   Procedure Laterality Date     COLONOSCOPY N/A 05/23/2022    Procedure: COLONOSCOPY;  Surgeon: Parth Meneses MD;  Location: [facility] OR     CV CORONARY ANGIOGRAM N/A 05/24/2022    Procedure: Coronary Angiogram;  Surgeon: Mike Pope MD;  Location: [facility] CARDIAC CATH LAB     CV CORONARY ANGIOGRAM N/A 05/29/2022    Procedure: Coronary Angiogram;  Surgeon: Austin Bright MD;  Location: [facility] CARDIAC CATH LAB     CV CORONARY LITHOTRIPSY PCI Bilateral 05/24/2022    Procedure: Percutaneous Coronary Intervention - Lithotripsy;  Surgeon: Mike Pope MD;  Location: [facility] CARDIAC CATH LAB     CV  INTRA AORTIC BALLOON N/A 06/17/2022    Procedure: Intraprocedure Aortic Balloon Pump Insertion;  Surgeon: Sherman Cerda MD;  [facility]     CV INTRA AORTIC BALLOON Right 07/03/2022    Procedure: Reposition of Subclavian Intraprocedure Aortic Balloon Pump;  Surgeon: Austin Bright MD;  Location:  HEART CARDIAC CATH LAB     CV INTRAVASULAR ULTRASOUND N/A 05/24/2022    Procedure: Intravascular Ultrasound;  Surgeon: Mike Pope MD;  Location:  HEART CARDIAC CATH LAB     CV PCI ANGIOPLASTY N/A 05/29/2022    Procedure: Percutaneous Transluminal Angioplasty;  Surgeon: Austin Bright MD;  Location:  HEART CARDIAC CATH LAB     CV PCI STENT DRUG ELUTING N/A 05/24/2022    Procedure: Percutaneous Coronary Intervention Stent;  Surgeon: Mike Pope MD;  Location:  HEART CARDIAC CATH LAB     CV RIGHT HEART CATH MEASUREMENTS RECORDED N/A 05/18/2022    Procedure: Right Heart Catheterization;  Surgeon: Justo Stewart MD;  Location:  HEART CARDIAC CATH LAB     CV RIGHT HEART CATH MEASUREMENTS RECORDED N/A 05/24/2022    Procedure: Right Heart Catheterization;  Surgeon: Mike Pope MD;  Location:  HEART CARDIAC CATH LAB     CV RIGHT HEART CATH MEASUREMENTS RECORDED N/A 05/29/2022    Procedure: Right Heart Catheterization;  Surgeon: Austin Bright MD;  Location:  HEART CARDIAC CATH LAB     CV RIGHT HEART CATH MEASUREMENTS RECORDED N/A 07/01/2022    Procedure: Right Heart Catheterization;  Surgeon: Mike Pope MD;  Location:  HEART CARDIAC CATH LAB     CV RIGHT HEART EXERCISE STRESS STUDY N/A 05/18/2022    Procedure: Stress Drug Study;  Surgeon: Justo Stewart MD;  Location:  HEART CARDIAC CATH LAB     CV SWAN CHOCO PROCEDURE N/A 06/17/2022    Procedure: [place] Procedure;  Surgeon: Sherman Cerda MD;  Location:  HEART CARDIAC CATH LAB     INCISION AND DRAINAGE CHEST WASHOUT, COMBINED N/A 07/11/2022    Procedure: Chest washout and closure;   Surgeon: Darnell Morgan MD;  [facility]: [facility]     INCISION AND DRAINAGE CHEST WASHOUT, COMBINED N/A 07/12/2022    Procedure: INCISION AND DRAINAGE, WOUND, CHEST, WITH IRRIGATION;  Surgeon: Darius Zamora MD;  Location: [facility] OR     INSERT ARTERIAL LINE  07/18/2022          INSERT INTRAAORTIC BALLOON PUMP Right 06/23/2022    Procedure: INSERTION, INTRA-AORTIC BALLOON PUMP RIGHT SUBCLAVIAN ARTERY.;  Surgeon: Hayden Veras MD;  Location: [facility] OR     INSERT VENTRICULAR ASSIST DEVICE LEFT (HEARTMATE II/III) MINIMALLY INVASIVE N/A 07/08/2022    Procedure: MEDIAN STERNOTOMY.  CARDIOPULMONARY BYPASS.  INTRAOPERATIVE TRANSESOPHAGEAL ECHOCARDIOGRAM.  IMPLANT LEFT VENTRICULAR ASSIST DEVICE HEARTMATE III.  PLACEMENT OF PERCUTANEOUS RIGHT VENTRICULAR ASSIST DEVICE.  TEMPORARY CHEST CLOSURE;  Surgeon: Darius Zamora MD;  Location: [facility] OR     IR CHEST TUBE PLACEMENT NON-TUNNELED RIGHT  08/01/2022     IRRIGATION AND DEBRIDEMENT CHEST WASHOUT, COMBINED N/A 07/13/2022    Procedure: IRRIGATION AND DEBRIDEMENT, CHEST;  Surgeon: Darius Zamora MD;  Location: [facility] OR     PICC DOUBLE LUMEN PLACEMENT Right 05/28/2022    right basilic 5 fr dl picc 40 cm     PICC DOUBLE LUMEN PLACEMENT Right 08/15/2022    Right Lateral, 41 total length, 3 cm external length       Past medical history, past surgical history, medications, and allergies were reviewed with the patient. Additional pertinent items: None    FAMILY HISTORY: No family history on file.    SOCIAL HISTORY:   Social History     Tobacco Use     Smoking status: Not on file     Smokeless tobacco: Not on file   Substance Use Topics     Alcohol use: Not on file     Social history was reviewed with the patient. Additional pertinent items: None    Patient's Medications   New Prescriptions    No medications on file   Previous Medications    ACETAMINOPHEN (TYLENOL) 325 MG TABLET    Take 3 tablets (975 mg) by mouth every 8 hours for 30 days    ATORVASTATIN (LIPITOR) 40 MG TABLET     Take 1 tablet (40 mg) by mouth every evening for 30 days    DIGOXIN (LANOXIN) 125 MCG TABLET    Take 1 tablet (125 mcg) by mouth daily for 30 days    FLUTICASONE-VILANTEROL (BREO ELLIPTA) 100-25 MCG/INH INHALER    Inhale 1 puff into the lungs daily for 30 days    HYDRALAZINE (APRESOLINE) 50 MG TABLET    Take 1 tablet (50 mg) by mouth 3 times daily for 30 days    HYDROCORTISONE 1 % CREA CREAM    Place rectally 3 times daily for 2 days    HYDROXYCHLOROQUINE (PLAQUENIL) 200 MG TABLET    Take 200 mg by mouth 2 times daily    HYDROXYZINE (ATARAX) 25 MG TABLET    Take 1 tablet (25 mg) by mouth every 6 hours as needed for anxiety (ANXIETY)    LISINOPRIL (ZESTRIL) 10 MG TABLET    Take 1 tablet (10 mg) by mouth daily for 30 days    MELATONIN 10 MG TABS TABLET    Take 1 tablet (10 mg) by mouth At Bedtime for 30 days    MELATONIN 3 MG TABLET    Take 6 mg by mouth nightly as needed for sleep    MULTIVITAMIN W/MINERALS (THERA-VIT-M) TABLET    Take 1 tablet by mouth daily    MYCOPHENOLATE (GENERIC EQUIVALENT) 500 MG TABLET    Take 3 tablets (1,500 mg) by mouth 2 times daily for 30 days    PANTOPRAZOLE (PROTONIX) 40 MG EC TABLET    Take 1 tablet (40 mg) by mouth daily    PRAMOX-PE-GLYCERIN-PETROLATUM (PREPARATION H) 1-0.25-14.4-15 % CREA CREAM    Place rectally 2 times daily as needed for hemorrhoids Apply immediately after a bowel movement.    PROMETHAZINE (PHENERGAN) 12.5 MG TABLET    Take 1 tablet (12.5 mg) by mouth every 6 hours as needed for nausea or vomiting    QUETIAPINE (SEROQUEL) 25 MG TABLET    Take 1 tablet (25 mg) by mouth 2 times daily for 30 days    QUETIAPINE (SEROQUEL) 50 MG TABLET    Take 3 tablets (150 mg) by mouth At Bedtime for 30 days    SERTRALINE (ZOLOFT) 100 MG TABLET    Take 1 tablet (100 mg) by mouth daily for 30 days    THIAMINE (B-1) 100 MG TABLET    Take 1 tablet (100 mg) by mouth daily    WARFARIN ANTICOAGULANT (COUMADIN) 2.5 MG TABLET    Take 2 tabs (5mg) daily at bedtime. Future dose adjustments  "pending INR    ZOLPIDEM (AMBIEN) 5 MG TABLET    Take 5 mg by mouth nightly as needed for sleep   Modified Medications    No medications on file   Discontinued Medications    No medications on file        No Known Allergies    A complete review of systems was performed with pertinent positives and negatives noted in the HPI, and all other systems negative.    Physical Exam   BP: (!) 80/50 (lvad)  Pulse: 61  Temp: 97.7  F (36.5  C)  Resp: 16  Height: 170.2 cm (5' 7\")  Weight: 59 kg (130 lb)  SpO2: 98 %      Physical Exam  Vitals and nursing note reviewed.   Constitutional:       General: He is not in acute distress.     Appearance: He is well-developed. He is ill-appearing. He is not diaphoretic.   HENT:      Head: Normocephalic and atraumatic.      Mouth/Throat:      Pharynx: No oropharyngeal exudate.   Eyes:      General: No scleral icterus.        Right eye: No discharge.         Left eye: No discharge.      Pupils: Pupils are equal, round, and reactive to light.   Cardiovascular:      Rate and Rhythm: Normal rate and regular rhythm.      Comments: Mechanical sound from LVAD.  Pulmonary:      Effort: Pulmonary effort is normal. No respiratory distress.      Breath sounds: Normal breath sounds. No wheezing.   Chest:      Chest wall: No tenderness.   Abdominal:      General: Bowel sounds are normal. There is no distension.      Palpations: Abdomen is soft.      Tenderness: There is no abdominal tenderness.   Musculoskeletal:         General: No tenderness or deformity. Normal range of motion.      Cervical back: Normal range of motion and neck supple.   Skin:     General: Skin is warm and dry.      Coloration: Skin is not pale.      Findings: No erythema or rash.   Neurological:      Mental Status: He is alert and oriented to person, place, and time.      Cranial Nerves: No cranial nerve deficit.         ED Course        Procedures                    EKG Interpretation:      Interpreted by Darius Miranda, DO  Time " reviewed:1840   Symptoms at time of EKG: Generalized weakness  Rhythm: Normal sinus   Rate: 86  Axis: Left Axis Deviation  Ectopy: None  Conduction: Normal and Nonspecific interventricular conduction block  ST Segments/ T Waves: No acute ischemic changes  Q Waves: None and Nonspecific  Comparison to prior: Unchanged from 8/19/2022    Clinical Impression: no acute changes         Labs Ordered and Resulted from Time of ED Arrival to Time of ED Departure - No data to display         Assessments & Plan (with Medical Decision Making)   Is a 61-year-old male with a HeartMate 3 who presents with low flow alarms and generalized weakness.  Patient has had diarrhea for the past 3 days.  Patient is noted to have 5 low flow alarms.  On exam he appears unwell and generally weak.  ECG is unchanged from previous.  Lab work was obtained prior to transfer.  We will obtain LDH.  Patient was given 500 mL of normal saline.  I discussed case with Cardiology will admit to their service.    I have reviewed the nursing notes.    I have reviewed the findings, diagnosis, plan and need for follow up with the patient.    New Prescriptions    No medications on file       Final diagnoses:   None     Darius Miranda,     9/6/2022   [facility] EMERGENCY DEPARTMENT       Darius Miranda,   09/06/22 1953

## 2022-09-06 NOTE — PROGRESS NOTES
VAD coordinator present for VAD speed optimization echo.   VAD Coordinator adjusted speed, monitored VAD parameters and EKG, LV size, patient tolerance, and recorded findings according to Speed Optimization protocol. See Speed Optimization sheet for full results.   Parameters pre-optimization: Speed: 5200 rpm, Flow: 3.2 lpm, PI: 3.4, Power: 3.6 persaud  Speed range evaluated: 5000 - 5200 rpm's. Protocol guidelines followed.     Unable to increase speed at higher than baseline due to LV size. Pt with multiple low flow alarms, weakness, fatigue (falls asleep during study, and nods off after speaking to him), diarrhea, likely dehydration. Reviewed findings with Dr. Lora. Decreased pt's speed to 5100rpm's at conclusion of echo and sent to ED. ED Charge RN notified.   VAD parameters as follows: Speed: 5100rpm's, Flow: 3.3l/min, PI: 5.2, Power: 3.6w

## 2022-09-07 LAB
ANION GAP SERPL CALCULATED.3IONS-SCNC: 11 MMOL/L (ref 7–15)
ATRIAL RATE - MUSE: 68 BPM
BUN SERPL-MCNC: 11.3 MG/DL (ref 8–23)
C COLI+JEJUNI+LARI FUSA STL QL NAA+PROBE: NOT DETECTED
C DIFF TOX B STL QL: POSITIVE
CALCIUM SERPL-MCNC: 9 MG/DL (ref 8.8–10.2)
CHLORIDE SERPL-SCNC: 108 MMOL/L (ref 98–107)
CREAT SERPL-MCNC: 0.57 MG/DL (ref 0.67–1.17)
DEPRECATED HCO3 PLAS-SCNC: 18 MMOL/L (ref 22–29)
DIASTOLIC BLOOD PRESSURE - MUSE: NORMAL MMHG
EC STX1 GENE STL QL NAA+PROBE: NOT DETECTED
EC STX2 GENE STL QL NAA+PROBE: NOT DETECTED
ERYTHROCYTE [DISTWIDTH] IN BLOOD BY AUTOMATED COUNT: 17.1 % (ref 10–15)
GFR SERPL CREATININE-BSD FRML MDRD: >90 ML/MIN/1.73M2
GLUCOSE SERPL-MCNC: 83 MG/DL (ref 70–99)
HCT VFR BLD AUTO: 29.8 % (ref 40–53)
HGB BLD-MCNC: 9.3 G/DL (ref 13.3–17.7)
INTERPRETATION ECG - MUSE: NORMAL
MAGNESIUM SERPL-MCNC: 1.7 MG/DL (ref 1.7–2.3)
MCH RBC QN AUTO: 29.2 PG (ref 26.5–33)
MCHC RBC AUTO-ENTMCNC: 31.2 G/DL (ref 31.5–36.5)
MCV RBC AUTO: 93 FL (ref 78–100)
NOROV GI+II ORF1-ORF2 JNC STL QL NAA+PR: NOT DETECTED
P AXIS - MUSE: NORMAL DEGREES
PLATELET # BLD AUTO: 231 10E3/UL (ref 150–450)
POTASSIUM SERPL-SCNC: 3.8 MMOL/L (ref 3.4–5.3)
PR INTERVAL - MUSE: NORMAL MS
QRS DURATION - MUSE: 180 MS
QT - MUSE: 324 MS
QTC - MUSE: 387 MS
R AXIS - MUSE: -62 DEGREES
RBC # BLD AUTO: 3.19 10E6/UL (ref 4.4–5.9)
RVA NSP5 STL QL NAA+PROBE: NOT DETECTED
SALMONELLA SP RPOD STL QL NAA+PROBE: NOT DETECTED
SHIGELLA SP+EIEC IPAH STL QL NAA+PROBE: NOT DETECTED
SODIUM SERPL-SCNC: 137 MMOL/L (ref 136–145)
SYSTOLIC BLOOD PRESSURE - MUSE: NORMAL MMHG
T AXIS - MUSE: -66 DEGREES
V CHOL+PARA RFBL+TRKH+TNAA STL QL NAA+PR: NOT DETECTED
VENTRICULAR RATE- MUSE: 86 BPM
WBC # BLD AUTO: 5.5 10E3/UL (ref 4–11)
Y ENTERO RECN STL QL NAA+PROBE: NOT DETECTED

## 2022-09-07 PROCEDURE — 250N000013 HC RX MED GY IP 250 OP 250 PS 637: Performed by: INTERNAL MEDICINE

## 2022-09-07 PROCEDURE — 999N000111 HC STATISTIC OT IP EVAL DEFER: Performed by: OCCUPATIONAL THERAPIST

## 2022-09-07 PROCEDURE — 120N000003 HC R&B IMCU UMMC

## 2022-09-07 PROCEDURE — 87506 IADNA-DNA/RNA PROBE TQ 6-11: CPT

## 2022-09-07 PROCEDURE — 250N000012 HC RX MED GY IP 250 OP 636 PS 637

## 2022-09-07 PROCEDURE — 96361 HYDRATE IV INFUSION ADD-ON: CPT | Performed by: EMERGENCY MEDICINE

## 2022-09-07 PROCEDURE — 87493 C DIFF AMPLIFIED PROBE: CPT

## 2022-09-07 PROCEDURE — 258N000003 HC RX IP 258 OP 636

## 2022-09-07 PROCEDURE — 82310 ASSAY OF CALCIUM: CPT

## 2022-09-07 PROCEDURE — 83735 ASSAY OF MAGNESIUM: CPT

## 2022-09-07 PROCEDURE — 93750 INTERROGATION VAD IN PERSON: CPT | Performed by: INTERNAL MEDICINE

## 2022-09-07 PROCEDURE — 99233 SBSQ HOSP IP/OBS HIGH 50: CPT | Mod: 25 | Performed by: INTERNAL MEDICINE

## 2022-09-07 PROCEDURE — 36415 COLL VENOUS BLD VENIPUNCTURE: CPT

## 2022-09-07 PROCEDURE — 250N000013 HC RX MED GY IP 250 OP 250 PS 637

## 2022-09-07 PROCEDURE — 85014 HEMATOCRIT: CPT

## 2022-09-07 RX ORDER — WARFARIN SODIUM 5 MG/1
5 TABLET ORAL
Status: COMPLETED | OUTPATIENT
Start: 2022-09-07 | End: 2022-09-07

## 2022-09-07 RX ORDER — POTASSIUM CHLORIDE 20MEQ/15ML
60 LIQUID (ML) ORAL DAILY
Status: DISCONTINUED | OUTPATIENT
Start: 2022-09-07 | End: 2022-09-15 | Stop reason: HOSPADM

## 2022-09-07 RX ORDER — ACETAMINOPHEN 325 MG/1
975 TABLET ORAL EVERY 8 HOURS PRN
Status: DISCONTINUED | OUTPATIENT
Start: 2022-09-07 | End: 2022-09-15 | Stop reason: HOSPADM

## 2022-09-07 RX ORDER — HYDRALAZINE HYDROCHLORIDE 50 MG/1
50 TABLET, FILM COATED ORAL 3 TIMES DAILY
Status: DISCONTINUED | OUTPATIENT
Start: 2022-09-07 | End: 2022-09-10

## 2022-09-07 RX ORDER — LISINOPRIL 5 MG/1
10 TABLET ORAL DAILY
Status: DISCONTINUED | OUTPATIENT
Start: 2022-09-07 | End: 2022-09-11

## 2022-09-07 RX ADMIN — MYCOPHENOLATE MOFETIL 1500 MG: 500 TABLET, FILM COATED ORAL at 09:18

## 2022-09-07 RX ADMIN — DIGOXIN 125 MCG: 125 TABLET ORAL at 09:17

## 2022-09-07 RX ADMIN — QUETIAPINE 25 MG: 25 TABLET ORAL at 14:42

## 2022-09-07 RX ADMIN — LISINOPRIL 10 MG: 5 TABLET ORAL at 09:14

## 2022-09-07 RX ADMIN — PANTOPRAZOLE SODIUM 40 MG: 40 TABLET, DELAYED RELEASE ORAL at 09:15

## 2022-09-07 RX ADMIN — HYDRALAZINE HYDROCHLORIDE 50 MG: 50 TABLET, FILM COATED ORAL at 14:42

## 2022-09-07 RX ADMIN — SODIUM CHLORIDE, POTASSIUM CHLORIDE, SODIUM LACTATE AND CALCIUM CHLORIDE 1500 ML: 600; 310; 30; 20 INJECTION, SOLUTION INTRAVENOUS at 13:04

## 2022-09-07 RX ADMIN — HYDROXYCHLOROQUINE SULFATE 200 MG: 200 TABLET, FILM COATED ORAL at 09:20

## 2022-09-07 RX ADMIN — HYDRALAZINE HYDROCHLORIDE 50 MG: 50 TABLET, FILM COATED ORAL at 10:42

## 2022-09-07 RX ADMIN — ACETAMINOPHEN 975 MG: 325 TABLET, FILM COATED ORAL at 09:15

## 2022-09-07 RX ADMIN — SERTRALINE HYDROCHLORIDE 100 MG: 100 TABLET ORAL at 09:14

## 2022-09-07 RX ADMIN — SODIUM CHLORIDE, POTASSIUM CHLORIDE, SODIUM LACTATE AND CALCIUM CHLORIDE 1000 ML: 600; 310; 30; 20 INJECTION, SOLUTION INTRAVENOUS at 09:24

## 2022-09-07 RX ADMIN — HYDROXYCHLOROQUINE SULFATE 200 MG: 200 TABLET, FILM COATED ORAL at 20:53

## 2022-09-07 RX ADMIN — ACETAMINOPHEN 975 MG: 325 TABLET, FILM COATED ORAL at 21:14

## 2022-09-07 RX ADMIN — THIAMINE HCL TAB 100 MG 100 MG: 100 TAB at 09:16

## 2022-09-07 RX ADMIN — Medication 3 MG: at 21:20

## 2022-09-07 RX ADMIN — Medication 1 TABLET: at 09:15

## 2022-09-07 RX ADMIN — HYDROCORTISONE: 10 CREAM TOPICAL at 20:53

## 2022-09-07 RX ADMIN — HYDRALAZINE HYDROCHLORIDE 50 MG: 50 TABLET, FILM COATED ORAL at 21:15

## 2022-09-07 RX ADMIN — QUETIAPINE 25 MG: 25 TABLET ORAL at 09:17

## 2022-09-07 RX ADMIN — ATORVASTATIN CALCIUM 40 MG: 40 TABLET, FILM COATED ORAL at 20:53

## 2022-09-07 RX ADMIN — Medication 150 MG: at 21:20

## 2022-09-07 RX ADMIN — WARFARIN SODIUM 5 MG: 5 TABLET ORAL at 20:54

## 2022-09-07 RX ADMIN — MYCOPHENOLATE MOFETIL 1500 MG: 500 TABLET, FILM COATED ORAL at 20:53

## 2022-09-07 ASSESSMENT — ACTIVITIES OF DAILY LIVING (ADL)
ADLS_ACUITY_SCORE: 43
ADLS_ACUITY_SCORE: 39

## 2022-09-07 NOTE — PLAN OF CARE
OT: After chart review and conversation/observation with this pt it is concluded that he has no acute OT needs. Pt currently up in room I with basic mobility and ADL, defer acute OT today.

## 2022-09-07 NOTE — PROGRESS NOTES
D: Called patient for routine virtual VAD Coordinator rounding. No VAD related questions or concerns at this time.  I: Discussed POC and provided support and listened to patient and care giver's thoughts and concerns.  P: Continue to follow patient and address any questions or concerns patient and or caregiver may have.

## 2022-09-07 NOTE — TELEPHONE ENCOUNTER
FUTURE VISIT INFORMATION      FUTURE VISIT INFORMATION:    Date: 9/9/2022    Time: 1pm    Location: [facility]  REFERRAL INFORMATION:    Referring provider:  Alana Philippe MDBurdash, Sara Jo, MD    Referring providers clinic:  [facility]    Reason for visit/diagnosis  epistaxis, referral from Alana Philippe. Ok to schedule per clinic    RECORDS REQUESTED FROM:       Clinic name Comments Records Status Imaging Status   [facility] 9/4/2022 referral by Alana Philippe MDBurdash, Sara Jo, MD epic    Imaging  8/22/2022 CT Head  8/7/2022 CT Head  8/01/2022 CT Head  5/19/2022 CT Head   5/17/2022 CT Dental   5/16/2022 US Carotid Bilat Epic  pacs

## 2022-09-07 NOTE — PROGRESS NOTES
"CLINICAL NUTRITION SERVICES - BRIEF NOTE     Reason for RD note: Provider order - Congestive Heart Failure (CHF) - Dietitian to instruct patient on 2 gram sodium diet    New Findings/Chart Review:  Pt has received low sodium education many times recently. First on 5/28/22 and throughout his hospital stay from 6/17/22-8/21/22. Pt reports that he follows a low sodium diet at home and was on a 2g sodium diet throughout his admission. RD has also discussed importance of nutrition relationship to health/disease: \"Provided instruction on the need to eat small, frequent meals. Importance of adequate oral intake post-op, especially for 6-8 weeks, was discussed. (Provided handouts) regarding the amounts of protein in various food items.\"    Interventions:  None    Nutrition will follow per LOS protocol or sooner if consulted.    Kelsey Prado, MS, RD, LD  4E (CVICU) RD pager: [phone]  Ascom: [phone]  Weekend/Holiday RD pager: [phone]  "

## 2022-09-07 NOTE — PROGRESS NOTES
[facility]  Cardiology History and Physical - Cardiology    Date of Admission:  9/6/2022    Assessment & Plan: MARISOL Dorman EDUARD Sands is a 61 year old male admitted on 9/5/2022. He is a 61M with pmhx of SLE, antiphospholipid syndrome, hx of history pulmonary embolism (on anticoagulation with warfarin), HFrEF due to ICM, HLD. He initially presented to [facility] on 6/13 due to nausea, vomiting, abdominal bloating and transferred to [facility] for admission on 6/17/2022 for decompensated heart failure 2/2 cardiogenic shock c/b multiorgan failure requiring IABP, is s/p HM 3 LVAD on 7/8/2022. Hospitalization was also c/b RV, had 29F Protek duo via RIJ, RVAD removed 7/21, hemopericardium requiring repeat washout, chest was closed 7/13. He then developed epistaxis on 8/6 ENT. Patient has h/o nasal perforation that required elective procedure (in [place]) which was postponed due to LVAD insertion.  He required intubation 8/7 for airway protection, s/p extubated 8/8. Nasal pack removed 8/9. Patient recently discharged on 09/05 for epistaxis after 1h after taking warfarin.  Now admitted for worsening loose stools up to 5/day without bleeding but presence of mucus. He also has developed more low PI events.     Changes today  - Switched from NaCl 0.9% to LR 1L over 4hrs twice given mild acidosis  - KCl 60meq solution given mild hypokalemia  - Resuming PTA hydralazine and losartan given MAP   - Orthostatics pending  - Pharmacy to dose warfarin to maintain INR 2-3    # Diarrheas  # Low PI events  # Non-anion gap metabolic acidosis 2/2 diarrheas  1-week of diarrheas x5/day. Has had low PI events over the past 24 hrs (~2.1-2.4) No mucus or bleeding. Denies fever, night sweats, or abdominal pain. Afebrile on admission. Physical exam with dry mucous membranes. Given 500cc NaCl 0.9%. Given 1L over 4hrs in floor.    - 1L NaCl 0.9% over 4h on 09/06, given 1L LR over 4h  twice on 09/07 (to ameliorate acidosis)  - Enteropathogen panel ordered, pending collection  - Cdiff ordered given recent multiple hospitalizations, pending collection  - Ok to resuming PTA Hydralazine 37.5 TID + Lisinopril 2.5 qday MAP 80-90    #HFrEF 2/2 ICM  in setting of cardiogenic shock (SCAI D) s/p HM3 LVAD  #RV failure s/p Protek duo temporary RVAD s/p decannulation 7/21  #RV thrombus  #Post chest closure hemopericardium s/p repeat washout (7/12)  #PMH CAD s/p PCI to LAD (2005)  #S/p CRT-D (2006)  Patient with ICM s/p HM3 LVAD 7/8/22 2/2 cardiogenic shock requiring MCS with IABP as prior to HM (initially evaluated for heart transplant, however noted to have substantially elevated DSAs during course of care, so decision made to proceed with LVAD given patient was already on temporary MCS). Intraoperative course during LVAD placement was c/b RVF resulting in cardiogenic shock requiring high dose pressors necessitating temporary RVAD support. Initial post-op course c/b hemopericardium requiring repeat washout with chest left open, with good recovery extubation and decannulation on 7/21. Patient tolerating hemodynamics and end organ standpoint well. He has had intermittent low flow alarms with high PI, no power spike, and a closed aortic valve on TTE. TTE also showed underfilling LV which could explain his low flow alarms d/t suckdown event so his LVAD speed was reduced to 5200. Patient did have a low flow alarm on 8/20 @ 1145 (PI 8-9 and not hypotensive); CTA chest done which showed that the LVAD outflow tract is widely patent. Patient is euvolemic on exam, but at times has had orthostasis symptoms; patient encouraged to stay hydrated within his fluid/diet restriction guidelines (no more than 2L/day of fluid intake and no more than 3g of Na per day in dietary intake).     - interrogation showed 2 low flow alarms in the past 24 hrs before epistaxis (has had at least 6 low flow alarms over the past 5 days, has hx  of hemorrhoidal bleeding x1 and soft stools)              > 500cc NaCl 0.9% over 2 hrs x1   > 1L NaCl 0.9% over 4hrs   - Volume status: Euvolemic              > Diuretics: Not needed at this time  - Afterload reduction: MAP goal 65-80; Resuming Hydralazine 37.5 TID + Lisinopril 2.5 qday  - Statin: Atorvastatin 40mg  - BB: Holding in setting of recent cardiogenic shock and orthostatic symptoms, consider restarting outpatient  - AA: Holding in setting of recent cardiogenic shock and orthostatic symptoms, consider restarting outpatient  - SGLTi: As outpatient, has been held due to immunosuppression he is on for his SLE.  - Anticoagulation: Continue PTA Warfarin. At goal INR 2.64 (INR 2-3)  - Antiplatelets: Continue PTA ASA 81mg qday.   -    - Continue PTA ASA 81 mg qday     #Epistaxis, improved  # Hx Intubated due to Concern for airway protection (8/7)-Extubated (8/8)  # Mild-moderate emphysema  # History of COVID-19  # Iatrogenic R apical PTX  H/o nasal perforation  For elective procedure (in [place]), postponed 2/2 LVAD insertion. Epistaxis not fixed by packing overnight on 8/6 and pt continued to bleed. Intubated for airway protection. Controlled at bedside by ENT s/p cautery and packing. Extubated 8/8. Had Cefepime/Vanc empirically for broad coverage for PNA (8/7-8/12). Patient removed his own nasal packing overnight on 8/9. Now admitted for profuse bleeding 1h after taking warfarin. ENT performed nasal package ~2h.  - Holding PTA Ayr nasal saline gel at bedtime + nasal saline q4H given ongoing nasal package  - Follow with Dr Vazquez (EN) in 2 weeks   - Maintain nasal package ~6h     #Fall   #Insomnia  Patient had a fall this AM, slipped and fell on his buttocks. Did not hit his head or LOC. Has had at least 1 episodes in prior hospitalizations with CT head unremarkable (8/7). Denied lightheadedness, dizziness  - Continue PTA Sertraline and Quetiapine   - Melatonin 10mg qday  - Fall precautions  -  Orthostatic ordered       #GERD  #Hx of Hematemesis / oral mucosal bleeding   #Dysphagia  GI consulted 7/31 for black tarry stools and a possible GI bleed, however it is more likely swallowed blood from epistaxis that patient previously had. GI did not recommend an upper endoscopy. Recurrence of bloody stools likely due to epistaxis which have resolved. Hemoglobin was monitored and continued to be stable throughout the rest of the hospitalization. Cleared for regular diet by SLP.  - Na 2g diet      #Anemia due to blood loss from Epistaxis, stable  #History of DVT and PE   #Antiphospholipid antibody syndrome  #History of iron deficiency anemia  Hb stable 11.1  - Transfuse if Hb<7 or actively bleeding  - Trend Hb     #SLE  - Holding PTA meds: hydroxychloroquine 200mg (held before and resumed 7/16)    Diet:  Na 2g diet  DVT Prophylaxis: Ambulate every shift  Fitzgerald Catheter: Not present  Code Status: Full Code  Fluids: NaCl 0.9% 500cc, followed by   Lines: PIV     Disposition Plan   Expected discharge: 2 - 3 days, recommended to prior living arrangement once fluid volume status optimized on oral medication.    Entered: Keily Bowles MD 09/07/2022, 2:17 PM     The patient's care was discussed with the Attending Physician, Dr. Askew.    Kristie Bowles   Internal Medicine PGY-2  ________________________________________________    Chief Complaint   Loose stools    Interval Progress  Nursing notes reviewed. Patient has had multiple PI events with low flow and high pulsatility index (at least 12 over 24h). He states some lightheadedness, dizziness but denies chest pain, palpitations, shortness of breath, abdominal pain. Stools samples have not been collected yet given patient did not have any diarrheas since admission. On physical exam, he looks dry but well perfused. Will continue with IVF carefully to avoid volume overload.     Review of Systems    The 10 point Review of Systems is negative other than  noted in the HPI or here.   Prior to Admission Medications   Prior to Admission Medications   Prescriptions Last Dose Informant Patient Reported? Taking?   Melatonin 10 MG TABS tablet   No No   Sig: Take 1 tablet (10 mg) by mouth At Bedtime for 30 days   QUEtiapine (SEROQUEL) 25 MG tablet   No No   Sig: Take 1 tablet (25 mg) by mouth 2 times daily for 30 days   QUEtiapine (SEROQUEL) 50 MG tablet   No No   Sig: Take 3 tablets (150 mg) by mouth At Bedtime for 30 days   acetaminophen (TYLENOL) 325 MG tablet   No No   Sig: Take 3 tablets (975 mg) by mouth every 8 hours for 30 days   atorvastatin (LIPITOR) 40 MG tablet   No No   Sig: Take 1 tablet (40 mg) by mouth every evening for 30 days   digoxin (LANOXIN) 125 MCG tablet   No No   Sig: Take 1 tablet (125 mcg) by mouth daily for 30 days   fluticasone-vilanterol (BREO ELLIPTA) 100-25 MCG/INH inhaler   No No   Sig: Inhale 1 puff into the lungs daily for 30 days   hydrALAZINE (APRESOLINE) 50 MG tablet   No No   Sig: Take 1 tablet (50 mg) by mouth 3 times daily for 30 days   hydrOXYzine (ATARAX) 25 MG tablet   No No   Sig: Take 1 tablet (25 mg) by mouth every 6 hours as needed for anxiety (ANXIETY)   hydrocortisone 1 % CREA cream   No No   Sig: Place rectally 3 times daily for 2 days   hydroxychloroquine (PLAQUENIL) 200 MG tablet   Yes No   Sig: Take 200 mg by mouth 2 times daily   lisinopril (ZESTRIL) 10 MG tablet   No No   Sig: Take 1 tablet (10 mg) by mouth daily for 30 days   melatonin 3 MG tablet   Yes No   Sig: Take 6 mg by mouth nightly as needed for sleep   multivitamin w/minerals (THERA-VIT-M) tablet   No No   Sig: Take 1 tablet by mouth daily   mycophenolate (GENERIC EQUIVALENT) 500 MG tablet   No No   Sig: Take 3 tablets (1,500 mg) by mouth 2 times daily for 30 days   pantoprazole (PROTONIX) 40 MG EC tablet   No No   Sig: Take 1 tablet (40 mg) by mouth daily   pramox-pe-glycerin-petrolatum (PREPARATION H) 1-0.25-14.4-15 % CREA cream   No No   Sig: Place rectally  2 times daily as needed for hemorrhoids Apply immediately after a bowel movement.   promethazine (PHENERGAN) 12.5 MG tablet   No No   Sig: Take 1 tablet (12.5 mg) by mouth every 6 hours as needed for nausea or vomiting   sertraline (ZOLOFT) 100 MG tablet   No No   Sig: Take 1 tablet (100 mg) by mouth daily for 30 days   thiamine (B-1) 100 MG tablet   No No   Sig: Take 1 tablet (100 mg) by mouth daily   warfarin ANTICOAGULANT (COUMADIN) 2.5 MG tablet   No No   Sig: Take 2 tabs (5mg) daily at bedtime. Future dose adjustments pending INR   zolpidem (AMBIEN) 5 MG tablet   Yes No   Sig: Take 5 mg by mouth nightly as needed for sleep      Facility-Administered Medications: None     Allergies   No Known Allergies    Physical Exam   Vital Signs: Temp: 97.7  F (36.5  C) Temp src: Temporal BP: 100/84 Pulse: 92   Resp: 21 SpO2: 100 % O2 Device: None (Room air)    Weight: 130 lbs 0 oz    Constitutional: awake, alert, cooperative, no apparent distress, and appears stated age  Hematologic / Lymphatic: no cervical lymphadenopathy and no supraclavicular lymphadenopathy  Respiratory: No increased work of breathing, good air exchange, clear to auscultation bilaterally, no crackles or wheezing  Cardiovascular: Normal apical impulse, regular rate and rhythm, normal S1 and S2, no S3 or S4, and no murmur noted  GI: No scars, normal bowel sounds, soft, non-distended, non-tender, no masses palpated, no hepatosplenomegally  Musculoskeletal: There is no redness, warmth, or swelling of the joints.  Full range of motion noted.  Motor strength is 5 out of 5 all extremities bilaterally.  Tone is normal.  Neurologic: Awake, alert, oriented to name, place and time.  Cranial nerves II-XII are grossly intact.  Motor is 5 out of 5 bilaterally.  Cerebellar finger to nose, heel to shin intact.  Sensory is intact.  Babinski down going, Romberg negative, and gait is normal.    Data   Data reviewed today: I reviewed all medications, new labs and imaging  results over the last 24 hours. I personally reviewed Cardiology specific data review: the EKG tracing showing no ischemic ST/T changes. Sinus tachycardia     Recent Labs   Lab 09/07/22 0549 09/06/22 1333 09/05/22 0617 09/04/22 2333 09/04/22  2234 09/04/22  0601 09/01/22  0606   WBC 5.5 7.5 7.0  --  6.7  --   --    HGB 9.3* 10.4* 9.4*  --  11.1* 11.1*  --    MCV 93 93 98  --  92  --   --     308 235  --  307  --   --    INR  --  2.49*  --  2.64*  --  2.69*  --     138 135*  --  137  --   --    POTASSIUM 3.8 4.1 4.1  --  4.2  --    < >   CHLORIDE 108* 106 105  --  106  --    < >   CO2 18* 21* 18*  --  16*  --    < >   BUN 11.3 15.0 24.2*  --  17.6  --    < >   CR 0.57* 0.79 0.50*  --  0.59*  --   --    ANIONGAP 11 11 12  --  15  --    < >   ELDA 9.0 9.8 9.1  --  9.5  --   --    GLC 83 112* 97  --  112*  --    < >   ALBUMIN  --  4.1  --   --  3.8  --   --    PROTTOTAL  --  7.7  --   --  7.7  --   --    BILITOTAL  --  0.4  --   --  0.4  --   --    ALKPHOS  --  136*  --   --  133*  --   --    ALT  --  20  --   --  15  --   --    AST  --  30  --   --  42  --   --     < > = values in this interval not displayed.     Most Recent 3 CBC's:  Recent Labs   Lab Test 09/07/22 0549 09/06/22 1333 09/05/22 0617   WBC 5.5 7.5 7.0   HGB 9.3* 10.4* 9.4*   MCV 93 93 98    308 235     Most Recent 3 BMP's:  Recent Labs   Lab Test 09/07/22 0549 09/06/22 1333 09/05/22  0617    138 135*   POTASSIUM 3.8 4.1 4.1   CHLORIDE 108* 106 105   CO2 18* 21* 18*   BUN 11.3 15.0 24.2*   CR 0.57* 0.79 0.50*   ANIONGAP 11 11 12   ELDA 9.0 9.8 9.1   GLC 83 112* 97     Most Recent 2 LFT's:  Recent Labs   Lab Test 09/06/22  1333 09/04/22  2234   AST 30 42   ALT 20 15   ALKPHOS 136* 133*   BILITOTAL 0.4 0.4     Most Recent 3 INR's:  Recent Labs   Lab Test 09/06/22  1333 09/04/22  2333 09/04/22  0601   INR 2.49* 2.64* 2.69*     Anticoagulation Dose History     Recent Dosing and Labs Latest Ref Rng & Units 8/31/2022 9/1/2022  9/2/2022 9/3/2022 9/4/2022 9/4/2022 9/6/2022    Warfarin 3 mg - 6 mg - - - - - -    Warfarin 5 mg - - - 5 mg - - - -    Warfarin 7.5 mg - - 7.5 mg - 7.5 mg - - -    [id number] 70 - 130 % - - - - - - -    INR 0.85 - 1.15 2.53(H) 2.25(H) 2.56(H) 2.64(H) 2.69(H) 2.64(H) 2.49(H)          Most Recent 3 Troponin's:No lab results found.  Most Recent 3 BNP's:  Recent Labs   Lab Test 09/04/22 2234 06/17/22  1746 05/27/22  2215   NTBNPI 928* 4,611* 2,006*     Most Recent D-dimer:  Recent Labs   Lab Test 09/06/22  1333   DD 1.78*     Most Recent Cholesterol Panel:  Recent Labs   Lab Test 07/21/22  0331 07/11/22  0337 06/18/22  0334   CHOL  --   --  79   LDL  --   --  38   HDL  --   --  30*   TRIG 110   < > 53    < > = values in this interval not displayed.     Most Recent Hemoglobin A1c:  Recent Labs   Lab Test 05/20/22  0516   A1C 5.5     Most Recent 6 glucoses:  Recent Labs   Lab Test 09/07/22  0549 09/06/22  1333 09/05/22  0617 09/04/22  2234 09/01/22  0606 08/29/22  0804   GLC 83 112* 97 112* 94 104*     Most Recent Urinalysis:  Recent Labs   Lab Test 08/31/22  1542   COLOR Yellow   APPEARANCE Clear   URINEGLC Negative   URINEBILI Negative   URINEKETONE Negative   SG 1.014   UBLD Negative   URINEPH 5.5   PROTEIN 30 *   NITRITE Negative   LEUKEST Negative   RBCU <1   WBCU 4     Most Recent ESR & CRP:  Recent Labs   Lab Test 09/04/22 2234 08/03/22  0606 06/19/22  1522   SED  --   --  39*   CRP 13.50*   < > 28.0*    < > = values in this interval not displayed.     Most Recent Anemia Panel:  Recent Labs   Lab Test 09/07/22  0549 06/24/22  1711 06/24/22  1005   WBC 5.5   < >  --    HGB 9.3*   < >  --    HCT 29.8*   < >  --    MCV 93   < >  --       < >  --    IRON  --   --  42*   IRONSAT  --   --  16   FEB  --   --  260   SHAWN  --   --  365    < > = values in this interval not displayed.     Recent Results (from the past 24 hour(s))   Echocardiogram Complete   Result Value    LVEF  10-15% (severely reduced)     [facility]    [id number]  [id number]  [id number]  [id number]^ZELDA^ADRIÁN^MARCUS     [facility],[place]  Echocardiography Laboratory  [address] [place] [id number]     Name: OLEG KAUR  MRN: [id number]  : 1960  Study Date: 2022 01:44 PM  Age: 61 yrs  Gender: Male  Patient Location: [facility], [place]  Reason For Study: Chronic systolic congestive heart failure (H), LVAD (left  ventri  Ordering Physician: ADRIÁN NDIAYE  Referring Physician: ADRIÁN NDIAYE  Performed By: Chantal Malin     BSA: 1.7 m2  Height: 67 in  Weight: 130 lb  ______________________________________________________________________________  ______________________________________________________________________________  Interpretation Summary  HM3 at 5200 rpm.  At baseline -  Left ventricular function is decreased. The ejection fraction is 10-15%  (severely reduced). Septum is midline.  LVAD cannula was seen in the expected anatomic position in the LV apex.  Mild right ventricular dilation is present. Global right ventricular function  is mildly reduced.  The AV remains closed throughout the cardiac cycle. There is mild, continuous  aortic regurgitation.  The inferior vena cava was normal in size with preserved respiratory  variability.  No pericardial effusion is present.     Please refer to the EPIC report for measurements performed at different LVAD  speed settings.  ______________________________________________________________________________  Left Ventricle  Left ventricular function is decreased. The ejection fraction is 10-15%  (severely reduced). Please refer to the EPIC report for measurements performed  at different LVAD speed settings. LVAD cannula was seen in the expected  anatomic position in the LV apex. Septum is midline.     Right Ventricle  Mild right ventricular dilation is present. Global right ventricular function  is mildly reduced.     Aortic Valve  The AV remains closed throughout the  cardiac cycle. There is mild, continuous  aortic regurgitation.     Tricuspid Valve  Mild tricuspid insufficiency is present.     Vessels  The inferior vena cava was normal in size with preserved respiratory  variability.     Pericardium  No pericardial effusion is present.  ______________________________________________________________________________  MMode/2D Measurements & Calculations  IVSd: 0.88 cm  LVIDd: 3.9 cm  LVIDs: 3.7 cm  LVPWd: 0.99 cm  FS: 3.1 %  LV mass(C)d: 109.2 grams  LV mass(C)dI: 64.9 grams/m2  RWT: 0.52     ______________________________________________________________________________  Report approved by: Nakia Gaines 09/06/2022 02:57 PM

## 2022-09-07 NOTE — UTILIZATION REVIEW
Admission Status; Secondary Review Determination       Under the authority of the Utilization Management Committee, the utilization review process indicated a secondary review on the above patient. The review outcome is based on review of the medical records, discussions with staff, and applying clinical experience noted on the date of the review.     (x) Inpatient Status Appropriate - This patient's medical care is consistent with medical management for inpatient care and reasonable inpatient medical practice.     RATIONALE FOR DETERMINATION     Patient requires inpatient admission versus short stay observation or outpatient treatment for the following reasons:  61 year old male with pmh of SLE, antiphospholipid, history of pulmonary embolism on warfarin, HFrEF due to ICM who presents with diarrhea and multiple low flow alarms on their LVAD. On admission his blood pressure was 80/50. Their blood pressure medications are being held and they are receiving IV fluids. Further diagnostic workup into diarrhea is pending.   The patient is extremely high risk for continued decompensation/adverse events given concern for diarrhea and low flow LVAD alarms and continued inpatient monitoring and treatment is appropriate to ensure that there remains adequate perfusion in the context of very severe cardiomyopathy with LV assist device.    The expected length of stay at the time of admission was more than 2 nights because of the severity of illness, intensity of service provided, and risk for adverse outcome. Inpatient admission is appropriate.         This document was produced using voice recognition software       The information on this document is developed by the utilization review team in order for the business office to ensure compliance. This only denotes the appropriateness of proper admission status and does not reflect the quality of care rendered.   The definitions of Inpatient Status and Observation Status used in  making the determination above are those provided in the CMS Coverage Manual, Chapter 1 and Chapter 6, section 70.4.   Sincerely,   Alverto He DO  Physician Advisor  [facility].

## 2022-09-08 LAB
ANION GAP SERPL CALCULATED.3IONS-SCNC: 11 MMOL/L (ref 7–15)
BUN SERPL-MCNC: 8.1 MG/DL (ref 8–23)
CALCIUM SERPL-MCNC: 9.7 MG/DL (ref 8.8–10.2)
CHLORIDE SERPL-SCNC: 105 MMOL/L (ref 98–107)
CREAT SERPL-MCNC: 0.63 MG/DL (ref 0.67–1.17)
DEPRECATED HCO3 PLAS-SCNC: 21 MMOL/L (ref 22–29)
ERYTHROCYTE [DISTWIDTH] IN BLOOD BY AUTOMATED COUNT: 17.2 % (ref 10–15)
GFR SERPL CREATININE-BSD FRML MDRD: >90 ML/MIN/1.73M2
GLUCOSE SERPL-MCNC: 89 MG/DL (ref 70–99)
HCT VFR BLD AUTO: 31.5 % (ref 40–53)
HGB BLD-MCNC: 10 G/DL (ref 13.3–17.7)
INR PPP: 1.88 (ref 0.85–1.15)
MAGNESIUM SERPL-MCNC: 1.5 MG/DL (ref 1.7–2.3)
MAGNESIUM SERPL-MCNC: 2.1 MG/DL (ref 1.7–2.3)
MCH RBC QN AUTO: 29.2 PG (ref 26.5–33)
MCHC RBC AUTO-ENTMCNC: 31.7 G/DL (ref 31.5–36.5)
MCV RBC AUTO: 92 FL (ref 78–100)
PLATELET # BLD AUTO: 257 10E3/UL (ref 150–450)
POTASSIUM SERPL-SCNC: 4 MMOL/L (ref 3.4–5.3)
RBC # BLD AUTO: 3.43 10E6/UL (ref 4.4–5.9)
SODIUM SERPL-SCNC: 137 MMOL/L (ref 136–145)
WBC # BLD AUTO: 6.5 10E3/UL (ref 4–11)

## 2022-09-08 PROCEDURE — 250N000013 HC RX MED GY IP 250 OP 250 PS 637

## 2022-09-08 PROCEDURE — 120N000003 HC R&B IMCU UMMC

## 2022-09-08 PROCEDURE — 250N000012 HC RX MED GY IP 250 OP 636 PS 637

## 2022-09-08 PROCEDURE — 83735 ASSAY OF MAGNESIUM: CPT

## 2022-09-08 PROCEDURE — 250N000013 HC RX MED GY IP 250 OP 250 PS 637: Performed by: INTERNAL MEDICINE

## 2022-09-08 PROCEDURE — 36415 COLL VENOUS BLD VENIPUNCTURE: CPT

## 2022-09-08 PROCEDURE — 83735 ASSAY OF MAGNESIUM: CPT | Performed by: INTERNAL MEDICINE

## 2022-09-08 PROCEDURE — 99233 SBSQ HOSP IP/OBS HIGH 50: CPT | Mod: 25 | Performed by: INTERNAL MEDICINE

## 2022-09-08 PROCEDURE — 85027 COMPLETE CBC AUTOMATED: CPT

## 2022-09-08 PROCEDURE — 80048 BASIC METABOLIC PNL TOTAL CA: CPT

## 2022-09-08 PROCEDURE — 250N000011 HC RX IP 250 OP 636: Performed by: INTERNAL MEDICINE

## 2022-09-08 PROCEDURE — 258N000003 HC RX IP 258 OP 636

## 2022-09-08 PROCEDURE — 36415 COLL VENOUS BLD VENIPUNCTURE: CPT | Performed by: INTERNAL MEDICINE

## 2022-09-08 PROCEDURE — 93750 INTERROGATION VAD IN PERSON: CPT | Performed by: INTERNAL MEDICINE

## 2022-09-08 PROCEDURE — 85610 PROTHROMBIN TIME: CPT

## 2022-09-08 RX ORDER — VANCOMYCIN HYDROCHLORIDE 125 MG/1
125 CAPSULE ORAL 4 TIMES DAILY
Status: DISCONTINUED | OUTPATIENT
Start: 2022-09-08 | End: 2022-09-15 | Stop reason: HOSPADM

## 2022-09-08 RX ORDER — WARFARIN SODIUM 7.5 MG/1
7.5 TABLET ORAL
Status: COMPLETED | OUTPATIENT
Start: 2022-09-08 | End: 2022-09-08

## 2022-09-08 RX ORDER — MAGNESIUM SULFATE HEPTAHYDRATE 40 MG/ML
4 INJECTION, SOLUTION INTRAVENOUS ONCE
Status: COMPLETED | OUTPATIENT
Start: 2022-09-08 | End: 2022-09-08

## 2022-09-08 RX ORDER — OXYCODONE HYDROCHLORIDE 5 MG/1
5 TABLET ORAL
Status: COMPLETED | OUTPATIENT
Start: 2022-09-08 | End: 2022-09-08

## 2022-09-08 RX ADMIN — QUETIAPINE 25 MG: 25 TABLET ORAL at 14:07

## 2022-09-08 RX ADMIN — DIGOXIN 125 MCG: 125 TABLET ORAL at 10:07

## 2022-09-08 RX ADMIN — Medication 150 MG: at 21:15

## 2022-09-08 RX ADMIN — WARFARIN SODIUM 7.5 MG: 7.5 TABLET ORAL at 17:37

## 2022-09-08 RX ADMIN — HYDRALAZINE HYDROCHLORIDE 50 MG: 50 TABLET, FILM COATED ORAL at 10:07

## 2022-09-08 RX ADMIN — Medication 1 TABLET: at 10:07

## 2022-09-08 RX ADMIN — SODIUM CHLORIDE, POTASSIUM CHLORIDE, SODIUM LACTATE AND CALCIUM CHLORIDE 1500 ML: 600; 310; 30; 20 INJECTION, SOLUTION INTRAVENOUS at 10:10

## 2022-09-08 RX ADMIN — MAGNESIUM SULFATE HEPTAHYDRATE 4 G: 40 INJECTION, SOLUTION INTRAVENOUS at 14:37

## 2022-09-08 RX ADMIN — ACETAMINOPHEN 975 MG: 325 TABLET, FILM COATED ORAL at 17:32

## 2022-09-08 RX ADMIN — PANTOPRAZOLE SODIUM 40 MG: 40 TABLET, DELAYED RELEASE ORAL at 10:07

## 2022-09-08 RX ADMIN — MYCOPHENOLATE MOFETIL 1500 MG: 500 TABLET, FILM COATED ORAL at 19:37

## 2022-09-08 RX ADMIN — OXYCODONE HYDROCHLORIDE 5 MG: 5 TABLET ORAL at 00:27

## 2022-09-08 RX ADMIN — SERTRALINE HYDROCHLORIDE 100 MG: 100 TABLET ORAL at 10:07

## 2022-09-08 RX ADMIN — HYDROXYCHLOROQUINE SULFATE 200 MG: 200 TABLET, FILM COATED ORAL at 10:07

## 2022-09-08 RX ADMIN — ATORVASTATIN CALCIUM 40 MG: 40 TABLET, FILM COATED ORAL at 19:37

## 2022-09-08 RX ADMIN — VANCOMYCIN HYDROCHLORIDE 125 MG: 125 CAPSULE ORAL at 19:37

## 2022-09-08 RX ADMIN — ACETAMINOPHEN 975 MG: 325 TABLET, FILM COATED ORAL at 10:07

## 2022-09-08 RX ADMIN — THIAMINE HCL TAB 100 MG 100 MG: 100 TAB at 10:08

## 2022-09-08 RX ADMIN — MYCOPHENOLATE MOFETIL 1500 MG: 500 TABLET, FILM COATED ORAL at 10:08

## 2022-09-08 RX ADMIN — SODIUM CHLORIDE, POTASSIUM CHLORIDE, SODIUM LACTATE AND CALCIUM CHLORIDE 1000 ML: 600; 310; 30; 20 INJECTION, SOLUTION INTRAVENOUS at 17:33

## 2022-09-08 RX ADMIN — VANCOMYCIN HYDROCHLORIDE 125 MG: 125 CAPSULE ORAL at 16:07

## 2022-09-08 RX ADMIN — VANCOMYCIN HYDROCHLORIDE 125 MG: 125 CAPSULE ORAL at 10:07

## 2022-09-08 RX ADMIN — LISINOPRIL 10 MG: 5 TABLET ORAL at 10:07

## 2022-09-08 RX ADMIN — QUETIAPINE 25 MG: 25 TABLET ORAL at 10:08

## 2022-09-08 RX ADMIN — VANCOMYCIN HYDROCHLORIDE 125 MG: 125 CAPSULE ORAL at 13:07

## 2022-09-08 RX ADMIN — HYDROXYCHLOROQUINE SULFATE 200 MG: 200 TABLET, FILM COATED ORAL at 19:37

## 2022-09-08 ASSESSMENT — ACTIVITIES OF DAILY LIVING (ADL)
TRANSFERRING: 1-->ASSISTANCE (EQUIPMENT/PERSON) NEEDED (NOT DEVELOPMENTALLY APPROPRIATE)
WALKING_OR_CLIMBING_STAIRS_DIFFICULTY: YES
ADLS_ACUITY_SCORE: 30
TRANSFERRING: 1-->ASSISTANCE (EQUIPMENT/PERSON) NEEDED
TOILETING_ISSUES: NO
ADLS_ACUITY_SCORE: 43
EQUIPMENT_CURRENTLY_USED_AT_HOME: WALKER, STANDARD
CONCENTRATING,_REMEMBERING_OR_MAKING_DECISIONS_DIFFICULTY: NO
ADLS_ACUITY_SCORE: 30
ADLS_ACUITY_SCORE: 30
WALKING_OR_CLIMBING_STAIRS: STAIR CLIMBING DIFFICULTY, REQUIRES EQUIPMENT
ADLS_ACUITY_SCORE: 30
DIFFICULTY_EATING/SWALLOWING: NO
ADLS_ACUITY_SCORE: 30
DRESSING/BATHING_DIFFICULTY: NO
ADLS_ACUITY_SCORE: 30
FALL_HISTORY_WITHIN_LAST_SIX_MONTHS: YES
WEAR_GLASSES_OR_BLIND: NO
ADLS_ACUITY_SCORE: 30
NUMBER_OF_TIMES_PATIENT_HAS_FALLEN_WITHIN_LAST_SIX_MONTHS: 4
CHANGE_IN_FUNCTIONAL_STATUS_SINCE_ONSET_OF_CURRENT_ILLNESS/INJURY: YES
ADLS_ACUITY_SCORE: 34
DOING_ERRANDS_INDEPENDENTLY_DIFFICULTY: YES
ADLS_ACUITY_SCORE: 30

## 2022-09-08 NOTE — PROGRESS NOTES
Admission          9/6/2022  4:47 PM  -----------------------------------------------------------  Reason for admission: C-diff and low PI events  Primary team notified of pt arrival.  Admitted from: [facility]  Via: stretcher  Accompanied by: transport staff  Belongings: Placed in closet; valuables sent home with family  Admission Profile: complete  Teaching: orientation to unit and call light- call light within reach, call don't fall, use of console, meal times, when to call for the RN, and enforced importance of safety   Access: PIV  Telemetry:Placed on pt  Ht./Wt.: complete  Code Status verified on armband: yes  2 RN Skin Assessment Completed By: Shania WESTON  Med Rec completed: yes  Bed surface reassessed with algorithm and charted: yes  New bed surface ordered: no  Suction/Ambu bag/Flowmeter at bedside: yes  Is patient having diarrhea upon admission- if YES fill out testing algorithm : yes - pt tested positive for C-diff in ED    C. Diff Testing Algorithm (MUST be marked YES)   1. 3 or more loose stools in 24 hrs. [] Yes [] No       Additional symptoms:(At least ONE must be marked yes)   1. Abdominal pain/discomfort [] Yes [] No   2. Fever at least 38C (100.4 F) [] Yes [] No   3. Elevated WBC(>11,000) [] Yes [] No       Exclusion Criteria:  (MUST be marked YES)   1. Off laxatives for at least 48 hrs. [] Yes [] No       Pt status: A&Ox4. PI elevated at 7.4. Cards 2 MD aware. Other VAD values WDL. VSS. On RA. Denies SOB. PIV.    Pulse:  [] 108  Resp:  [15-28] 22  BP: ()/(79-92) 100/84  SpO2:  [100 %] 100 %

## 2022-09-08 NOTE — PROGRESS NOTES
Attempted to round on pt for routine VAD rounding. Pt using commode. Will attempt check in another time.

## 2022-09-08 NOTE — PLAN OF CARE
Goal Outcome Evaluation:    Neuro: A&Ox4.   Cardiac: HM3. SR. Doppler MAPS ~80. PI occasionally elevated at 7-7.4 MD aware. Other VAD values WDL.   Respiratory: Sating > 95% on RA. Denies SOB.  GI/: Adequate urine output. No BM overnight.  Diet/appetite: 2g Na diet, 2L FR  Activity:  Assist of 1, up to chair and in halls.  Pain: At acceptable level on current regimen.   Skin: No new deficits noted.  LDA's: HM 3, PIV    Plan: Continue with POC. Notify primary team with changes.

## 2022-09-08 NOTE — CONSULTS
Care Management Initial Consult    General Information  Assessment completed with: Patient,         Primary Care Provider verified and updated as needed: Yes   Readmission within the last 30 days: current reason for admission unrelated to previous admission. Patient received VAD implant in June. Recovered inpatient and discharged to  ARU. Has since, been able to discharged from ARU and went to local apartment ([place]) with son and Dtr providing 24-hour supervision post-VAD implant.         Advance Care Planning: Advance Care Planning Reviewed: present on chart          Communication Assessment  Patient's communication style: spoken language (English or Bilingual)    Hearing Difficulty or Deaf: no   Wear Glasses or Blind: no    Cognitive  Cognitive/Neuro/Behavioral: WDL                      Living Environment:   People in home: Had been living alone prior to hospital admission     Current living Arrangements: house in [place], [place] (Currently in local apartment with family post-VAD)      Able to return to prior arrangements: yes       Family/Social Support:  Care provided by: Adult Children; Fantasma  Provides care for: no one  Marital Status:            Description of Support System: Supportive, Involved    Support Assessment: Adequate family and caregiver support    Current Resources:   Patient receiving home care services: No     Community Resources:    Equipment currently used at home:  (LVAD)  Supplies currently used at home:  Wound Care supplies for VAD    Employment/Financial:  Employment Status: disabled        Financial Concerns:   [place] assistance is assisting with paying for local lodging as patient has been unable to work since May          Lifestyle & Psychosocial Needs:  Social Determinants of Health     Tobacco Use: Not on file   Alcohol Use: Not on file   Financial Resource Strain: Not on file   Food Insecurity: Not on file   Transportation Needs: Not on file   Physical Activity:  Not on file   Stress: Not on file   Social Connections: Not on file   Intimate Partner Violence: Not on file   Depression: Not at risk     PHQ-2 Score: 0   Housing Stability: Not on file       Functional Status:  Prior to admission patient needed assistance: Is independent with ADLs. Able to walk and manage his LVAD independently.             Mental Health Status:  Mental Health Status: No Current Concerns       Chemical Dependency Status:  Chemical Dependency Status: No Current Concerns             Values/Beliefs:  Spiritual, Cultural Beliefs, Yarsanism Practices, Values that affect care:                 Additional Information:  Patient admitted for issues with Cdiff and now on abx. Reports feeling better and hoping he can discharge back to the apartment tomorrow. Follows up in VAD clinic and has 24-hour caregiver support provided by Son and Dtr. No psychosocial concerns. No discharge needs anticipated.    Yas Ennis, MSW

## 2022-09-08 NOTE — PLAN OF CARE
Neuro: A&Ox4. Able to make needs known.   Cardiac: SR, BBB with PVCs. BP 90s/70s, MAPS 80s . LVAD speed 8299-0638. Flows 2.8-3.3, PI 5.8-8.3. Power 3.6. Cards team aware of low flows, no alarms. LR fluid bolus over 4 hours this evening.   System check done this morning. Drsg changed at 1600  Respiratory: Sating >95% on RA. Denies any SOB.   GI/: Adequate urine output, uses bedside urinal. LBM earlier today. Oral vanc for c-diff infection.   Diet/appetite: Tolerating Low sodium diet. Fair appetite.  2L Fluid restriction.   Activity:  Assist of 2 and walker. Did not get OOB this evening d/t + orthostatics this AM. Denies any dizziness in bed.   Pain: c/o chronic low back pain. Adequate relief with scheduled Tylenol.   Skin: Skin shear to back, mepe lite replaced, CDI.   LDA's: PIV x1 to RUE, TKO.    Plan: Continue with POC. Notify primary team with changes.

## 2022-09-08 NOTE — PLAN OF CARE
B/P: 98/86, T: 98, P: 87, R: 16    Neuro: A&Ox4.   Cardiac: HM 3. Orthostatic BP lying MAP 90, sitting MAP 57, standing MAP 87. Dr. Askew at bedside when low flow alarm for 2.3 went off while sitting for orthostatics. Not concerned as issues are d/t dehydration and pt is being treated. Cards 2 notified as well and Hydralazine and Lisinopril held. After 1500mL LR bolus, Flow up to 3.5 from 2.9, PI down to 5.5 from 8   Respiratory: Sating 100% on RA.   GI/: Adequate urine output via urinal at bedside. BM X1  Diet/appetite: Tolerating reg diet. Poor appetite but improved for lunch. Within 2L FR easily.   Activity:  SBA to chair.   Pain: Denies  Skin: No new deficits noted. Drsing change to be completed tonight.   LDA's:PIV x1 infused 1500mL LR bolus and 4g Mag    Plan: Continue with POC. Notify primary team with changes. Printed C Diff education for pt.

## 2022-09-08 NOTE — PROGRESS NOTES
[facility]  Cardiology History and Physical - Cardiology    Date of Admission:  9/6/2022    Assessment & Plan: MARISOL Dorman EDUARD Sands is a 61 year old male admitted on 9/5/2022. He is a 61M with pmhx of SLE, antiphospholipid syndrome, hx of history pulmonary embolism (on anticoagulation with warfarin), HFrEF due to ICM, HLD. He initially presented to [facility] on 6/13 due to nausea, vomiting, abdominal bloating and transferred to [facility] for admission on 6/17/2022 for decompensated heart failure 2/2 cardiogenic shock c/b multiorgan failure requiring IABP, is s/p HM 3 LVAD on 7/8/2022. Hospitalization was also c/b RV, had 29F Protek duo via RIJ, RVAD removed 7/21, hemopericardium requiring repeat washout, chest was closed 7/13. He then developed epistaxis on 8/6 ENT. Patient has h/o nasal perforation that required elective procedure (in [place]) which was postponed due to LVAD insertion.  He required intubation 8/7 for airway protection, s/p extubated 8/8. Nasal pack removed 8/9. Patient recently discharged on 09/05 for epistaxis after 1h after taking warfarin.  Now admitted for worsening loose stools up to 5/day without bleeding but presence of mucus. He also has developed more low PI events.     Changes today  - No bridging despite subtherapeutic INR 1.88. Continue with warfarin  - C diff positive. Started with PO Vancomycin 125 mg QID  - Cdiff precautions  - Continue IVF given low PI events. Holding lisinopril and hydralazine given positive orthostatics    # Clostridium difficile infection, mild  # Low PI events  # Non-anion gap metabolic acidosis  # Moderate dehydration  1-week of diarrheas x5/day. Has had low PI events over the past 24 hrs (~2.1-2.4) No mucus or bleeding. Denies fever, night sweats, or abdominal pain. Afebrile on admission. Physical exam with dry mucous membranes. Negative enteropathogen panel.  - IVF    > 1L NaCl 0.9% over 4h on  09/06   > 1.5 L LR over 4h twice on 09/07 (to ameliorate acidosis)   > 1.5 L LR over 4h on 09/08  - Vancomycin 125mcg QID x10 days (09/08-P)    #HFrEF 2/2 ICM  in setting of cardiogenic shock (SCAI D) s/p HM3 LVAD  #RV failure s/p Protek duo temporary RVAD s/p decannulation 7/21  #RV thrombus  #Post chest closure hemopericardium s/p repeat washout (7/12)  #PMH CAD s/p PCI to LAD (2005)  #S/p CRT-D (2006)  Patient with ICM s/p HM3 LVAD 7/8/22 2/2 cardiogenic shock requiring MCS with IABP as prior to HM (initially evaluated for heart transplant, however noted to have substantially elevated DSAs during course of care, so decision made to proceed with LVAD given patient was already on temporary MCS). Intraoperative course during LVAD placement was c/b RVF resulting in cardiogenic shock requiring high dose pressors necessitating temporary RVAD support. Initial post-op course c/b hemopericardium requiring repeat washout with chest left open, with good recovery extubation and decannulation on 7/21. Patient tolerating hemodynamics and end organ standpoint well. He has had intermittent low flow alarms with high PI, no power spike, and a closed aortic valve on TTE. TTE also showed underfilling LV which could explain his low flow alarms d/t suckdown event so his LVAD speed was reduced to 5200. Patient did have a low flow alarm on 8/20 @ 1145 (PI 8-9 and not hypotensive); CTA chest done which showed that the LVAD outflow tract is widely patent. Patient is euvolemic on exam, but at times has had orthostasis symptoms; patient encouraged to stay hydrated within his fluid/diet restriction guidelines (no more than 2L/day of fluid intake and no more than 3g of Na per day in dietary intake).     - interrogation showed 2 low flow alarms in the past 24 hrs before epistaxis (has had at least 6 low flow alarms over the past 5 days, has hx of hemorrhoidal bleeding x1 and soft stools)              > 500cc NaCl 0.9% over 2 hrs x1   > 1L NaCl  0.9% over 4hrs   - Volume status: Euvolemic              > Diuretics: Not needed at this time  - Afterload reduction: MAP goal 65-80   >  Holding Hydralazine 37.5 TID    > Holding Lisinopril 2.5 qday  - Statin: Atorvastatin 40mg  - BB: Holding in setting of recent cardiogenic shock and orthostatic symptoms, consider restarting outpatient  - AA: Holding in setting of recent cardiogenic shock and orthostatic symptoms, consider restarting outpatient  - SGLTi: As outpatient, has been held due to immunosuppression he is on for his SLE.  - Anticoagulation: Continue PTA Warfarin. INR 1.88 (INR 2-3). Subtherapeutic on 09/08. No need for bridging. Low risk given HM-3  - Antiplatelets: Holding PTA ASA 81mg qday given hx of epistaxis  -       #Epistaxis, improved  # Hx Intubated due to Concern for airway protection (8/7)-Extubated (8/8)  # Mild-moderate emphysema  # History of COVID-19  # Iatrogenic R apical PTX  H/o nasal perforation  For elective procedure (in [place]), postponed 2/2 LVAD insertion. Epistaxis not fixed by packing overnight on 8/6 and pt continued to bleed. Intubated for airway protection. Controlled at bedside by ENT s/p cautery and packing. Extubated 8/8. Had Cefepime/Vanc empirically for broad coverage for PNA (8/7-8/12). Patient removed his own nasal packing overnight on 8/9. Now admitted for profuse bleeding 1h after taking warfarin. ENT performed nasal package ~2h.  - Holding PTA Ayr nasal saline gel at bedtime + nasal saline q4H given ongoing nasal package  - Follow with Dr Vazquez (EN) in 2 weeks   - Maintain nasal package ~6h     #Fall   #Insomnia  Patient had a fall this AM, slipped and fell on his buttocks. Did not hit his head or LOC. Has had at least 1 episodes in prior hospitalizations with CT head unremarkable (8/7). Denied lightheadedness, dizziness  - Continue PTA Sertraline and Quetiapine   - Melatonin 10mg qday  - Fall precautions  - Orthostatic negative       #GERD  #Hx of  Hematemesis / oral mucosal bleeding   #Dysphagia  GI consulted 7/31 for black tarry stools and a possible GI bleed, however it is more likely swallowed blood from epistaxis that patient previously had. GI did not recommend an upper endoscopy. Recurrence of bloody stools likely due to epistaxis which have resolved. Hemoglobin was monitored and continued to be stable throughout the rest of the hospitalization. Cleared for regular diet by SLP.  - Na 2g diet      #Anemia due to blood loss from Epistaxis, stable  #History of DVT and PE   #Antiphospholipid antibody syndrome  #History of iron deficiency anemia  Hb stable 11.1  - Transfuse if Hb<7 or actively bleeding  - Trend Hb     #SLE  - Holding PTA meds: hydroxychloroquine 200mg (held before and resumed 7/16)    Diet:  Na 2g diet  DVT Prophylaxis: Warfarin   Fitzgerald Catheter: Not present  Code Status: Full Code  Fluids: Lactated Ringer 1.5L x2   Lines: PIV     Disposition Plan   Expected discharge: 2 - 3 days, recommended to prior living arrangement once fluid volume status optimized on oral medication.    Entered: Keily Bowles MD 09/07/2022, 2:17 PM     The patient's care was discussed with the Attending Physician, Dr. Askew.    Kristie Bowles   Internal Medicine PGY-2  ________________________________________________  Chief Complaint   Loose stools    Interval Progress  Nursing notes reviewed. Patient came back positive for C diff and precautions were established. Started PO Vancomycin QID and continuing IVF. Patient continues with diarrheas at least once daily. Remains afebrile, normal Cr and WBC. On physical exam, patient looks still dry, but well perfused with warm to touch and normal capillary refill.     Review of Systems    The 10 point Review of Systems is negative other than noted in the HPI or here.   Prior to Admission Medications   Prior to Admission Medications   Prescriptions Last Dose Informant Patient Reported? Taking?   Melatonin 10  MG TABS tablet   No No   Sig: Take 1 tablet (10 mg) by mouth At Bedtime for 30 days   QUEtiapine (SEROQUEL) 25 MG tablet   No No   Sig: Take 1 tablet (25 mg) by mouth 2 times daily for 30 days   QUEtiapine (SEROQUEL) 50 MG tablet   No No   Sig: Take 3 tablets (150 mg) by mouth At Bedtime for 30 days   acetaminophen (TYLENOL) 325 MG tablet   No No   Sig: Take 3 tablets (975 mg) by mouth every 8 hours for 30 days   atorvastatin (LIPITOR) 40 MG tablet   No No   Sig: Take 1 tablet (40 mg) by mouth every evening for 30 days   digoxin (LANOXIN) 125 MCG tablet   No No   Sig: Take 1 tablet (125 mcg) by mouth daily for 30 days   fluticasone-vilanterol (BREO ELLIPTA) 100-25 MCG/INH inhaler   No No   Sig: Inhale 1 puff into the lungs daily for 30 days   hydrALAZINE (APRESOLINE) 50 MG tablet   No No   Sig: Take 1 tablet (50 mg) by mouth 3 times daily for 30 days   hydrOXYzine (ATARAX) 25 MG tablet   No No   Sig: Take 1 tablet (25 mg) by mouth every 6 hours as needed for anxiety (ANXIETY)   hydrocortisone 1 % CREA cream   No No   Sig: Place rectally 3 times daily for 2 days   hydroxychloroquine (PLAQUENIL) 200 MG tablet   Yes No   Sig: Take 200 mg by mouth 2 times daily   lisinopril (ZESTRIL) 10 MG tablet   No No   Sig: Take 1 tablet (10 mg) by mouth daily for 30 days   melatonin 3 MG tablet   Yes No   Sig: Take 6 mg by mouth nightly as needed for sleep   multivitamin w/minerals (THERA-VIT-M) tablet   No No   Sig: Take 1 tablet by mouth daily   mycophenolate (GENERIC EQUIVALENT) 500 MG tablet   No No   Sig: Take 3 tablets (1,500 mg) by mouth 2 times daily for 30 days   pantoprazole (PROTONIX) 40 MG EC tablet   No No   Sig: Take 1 tablet (40 mg) by mouth daily   pramox-pe-glycerin-petrolatum (PREPARATION H) 1-0.25-14.4-15 % CREA cream   No No   Sig: Place rectally 2 times daily as needed for hemorrhoids Apply immediately after a bowel movement.   promethazine (PHENERGAN) 12.5 MG tablet   No No   Sig: Take 1 tablet (12.5 mg) by  mouth every 6 hours as needed for nausea or vomiting   sertraline (ZOLOFT) 100 MG tablet   No No   Sig: Take 1 tablet (100 mg) by mouth daily for 30 days   thiamine (B-1) 100 MG tablet   No No   Sig: Take 1 tablet (100 mg) by mouth daily   warfarin ANTICOAGULANT (COUMADIN) 2.5 MG tablet   No No   Sig: Take 2 tabs (5mg) daily at bedtime. Future dose adjustments pending INR   zolpidem (AMBIEN) 5 MG tablet   Yes No   Sig: Take 5 mg by mouth nightly as needed for sleep      Facility-Administered Medications: None     Allergies   No Known Allergies    Physical Exam   Vital Signs: Temp: 97.7  F (36.5  C) Temp src: Temporal BP: 100/84 Pulse: 92   Resp: 21 SpO2: 100 % O2 Device: None (Room air)    Weight: 130 lbs 0 oz    Constitutional: awake, alert, cooperative, no apparent distress, appears dry  Hematologic / Lymphatic: no cervical lymphadenopathy and no supraclavicular lymphadenopathy  Respiratory: No increased work of breathing, good air exchange, clear to auscultation bilaterally, no crackles or wheezing  Cardiovascular: Normal apical impulse, regular rate and rhythm, normal S1 and S2, no S3 or S4, and no murmur noted  GI: No scars, normal bowel sounds, soft, non-distended, non-tender, no masses palpated, no hepatosplenomegally  Musculoskeletal: Normal capillary refill, warm to touch extremities.   Neurologic: Awake, alert, oriented to name, place and time.  Cranial nerves II-XII are grossly intact.  No focal deficit.     Data   Data reviewed today: I reviewed all medications, new labs and imaging results over the last 24 hours. I personally reviewed Cardiology specific data review: the EKG tracing showing no ischemic ST/T changes. Sinus tachycardia     Recent Labs   Lab 09/07/22  0549 09/06/22  1333 09/05/22  0617 09/04/22  2333 09/04/22  2234 09/04/22  0601 09/01/22  0606   WBC 5.5 7.5 7.0  --  6.7  --   --    HGB 9.3* 10.4* 9.4*  --  11.1* 11.1*  --    MCV 93 93 98  --  92  --   --     308 235  --  307  --    --    INR  --  2.49*  --  2.64*  --  2.69*  --     138 135*  --  137  --   --    POTASSIUM 3.8 4.1 4.1  --  4.2  --    < >   CHLORIDE 108* 106 105  --  106  --    < >   CO2 18* 21* 18*  --  16*  --    < >   BUN 11.3 15.0 24.2*  --  17.6  --    < >   CR 0.57* 0.79 0.50*  --  0.59*  --   --    ANIONGAP 11 11 12  --  15  --    < >   ELDA 9.0 9.8 9.1  --  9.5  --   --    GLC 83 112* 97  --  112*  --    < >   ALBUMIN  --  4.1  --   --  3.8  --   --    PROTTOTAL  --  7.7  --   --  7.7  --   --    BILITOTAL  --  0.4  --   --  0.4  --   --    ALKPHOS  --  136*  --   --  133*  --   --    ALT  --  20  --   --  15  --   --    AST  --  30  --   --  42  --   --     < > = values in this interval not displayed.     Most Recent 3 CBC's:  Recent Labs   Lab Test 09/07/22 0549 09/06/22 1333 09/05/22 0617   WBC 5.5 7.5 7.0   HGB 9.3* 10.4* 9.4*   MCV 93 93 98    308 235     Most Recent 3 BMP's:  Recent Labs   Lab Test 09/07/22  0549 09/06/22 1333 09/05/22  0617    138 135*   POTASSIUM 3.8 4.1 4.1   CHLORIDE 108* 106 105   CO2 18* 21* 18*   BUN 11.3 15.0 24.2*   CR 0.57* 0.79 0.50*   ANIONGAP 11 11 12   ELDA 9.0 9.8 9.1   GLC 83 112* 97     Most Recent 2 LFT's:  Recent Labs   Lab Test 09/06/22 1333 09/04/22  2234   AST 30 42   ALT 20 15   ALKPHOS 136* 133*   BILITOTAL 0.4 0.4     Most Recent 3 INR's:  Recent Labs   Lab Test 09/06/22 1333 09/04/22  2333 09/04/22  0601   INR 2.49* 2.64* 2.69*     Anticoagulation Dose History     Recent Dosing and Labs Latest Ref Rng & Units 8/31/2022 9/1/2022 9/2/2022 9/3/2022 9/4/2022 9/4/2022 9/6/2022    Warfarin 3 mg - 6 mg - - - - - -    Warfarin 5 mg - - - 5 mg - - - -    Warfarin 7.5 mg - - 7.5 mg - 7.5 mg - - -    LETRLO78NHDH 70 - 130 % - - - - - - -    INR 0.85 - 1.15 2.53(H) 2.25(H) 2.56(H) 2.64(H) 2.69(H) 2.64(H) 2.49(H)          Most Recent 3 Troponin's:No lab results found.  Most Recent 3 BNP's:  Recent Labs   Lab Test 09/04/22  2234 06/17/22  1746 05/27/22  2217    NTBNPI 928* 4,611* 2,006*     Most Recent D-dimer:  Recent Labs   Lab Test 22  1333   DD 1.78*     Most Recent Cholesterol Panel:  Recent Labs   Lab Test 22  0331 22  0337 22  0334   CHOL  --   --  79   LDL  --   --  38   HDL  --   --  30*   TRIG 110   < > 53    < > = values in this interval not displayed.     Most Recent Hemoglobin A1c:  Recent Labs   Lab Test 22  0516   A1C 5.5     Most Recent 6 glucoses:  Recent Labs   Lab Test 22  0549 22  1333 22  0617 22  2234 22  0606 22  0804   GLC 83 112* 97 112* 94 104*     Most Recent Urinalysis:  Recent Labs   Lab Test 22  1542   COLOR Yellow   APPEARANCE Clear   URINEGLC Negative   URINEBILI Negative   URINEKETONE Negative   SG 1.014   UBLD Negative   URINEPH 5.5   PROTEIN 30 *   NITRITE Negative   LEUKEST Negative   RBCU <1   WBCU 4     Most Recent ESR & CRP:  Recent Labs   Lab Test 22  2234 22  0606 22  1522   SED  --   --  39*   CRP 13.50*   < > 28.0*    < > = values in this interval not displayed.     Most Recent Anemia Panel:  Recent Labs   Lab Test 22  0549 22  1711 22  1005   WBC 5.5   < >  --    HGB 9.3*   < >  --    HCT 29.8*   < >  --    MCV 93   < >  --       < >  --    IRON  --   --  42*   IRONSAT  --   --  16   FEB  --   --  260   SHAWN  --   --  365    < > = values in this interval not displayed.     Recent Results (from the past 24 hour(s))   Echocardiogram Complete   Result Value    LVEF  10-15% (severely reduced)    [facility]    [id number]  [id number]  [id number]  [id number]^ZELDA^ADRIÁN^R     [facility],[place]  Echocardiography Laboratory  [address] [place] [id number]     Name: OLEG KAUR  MRN: [id number]  : 1960  Study Date: 2022 01:44 PM  Age: 61 yrs  Gender: Male  Patient Location: [facility]  Reason For Study: Chronic systolic congestive heart failure (H), LVAD (left  ventri  Ordering  Physician: ADRIÁN NDIAYE  Referring Physician: ADRIÁN NDIAYE  Performed By: Chantal Malin     BSA: 1.7 m2  Height: 67 in  Weight: 130 lb  ______________________________________________________________________________  ______________________________________________________________________________  Interpretation Summary  HM3 at 5200 rpm.  At baseline -  Left ventricular function is decreased. The ejection fraction is 10-15%  (severely reduced). Septum is midline.  LVAD cannula was seen in the expected anatomic position in the LV apex.  Mild right ventricular dilation is present. Global right ventricular function  is mildly reduced.  The AV remains closed throughout the cardiac cycle. There is mild, continuous  aortic regurgitation.  The inferior vena cava was normal in size with preserved respiratory  variability.  No pericardial effusion is present.     Please refer to the EPIC report for measurements performed at different LVAD  speed settings.  ______________________________________________________________________________  Left Ventricle  Left ventricular function is decreased. The ejection fraction is 10-15%  (severely reduced). Please refer to the EPIC report for measurements performed  at different LVAD speed settings. LVAD cannula was seen in the expected  anatomic position in the LV apex. Septum is midline.     Right Ventricle  Mild right ventricular dilation is present. Global right ventricular function  is mildly reduced.     Aortic Valve  The AV remains closed throughout the cardiac cycle. There is mild, continuous  aortic regurgitation.     Tricuspid Valve  Mild tricuspid insufficiency is present.     Vessels  The inferior vena cava was normal in size with preserved respiratory  variability.     Pericardium  No pericardial effusion is present.  ______________________________________________________________________________  MMode/2D Measurements & Calculations  IVSd: 0.88 cm  LVIDd: 3.9 cm  LVIDs:  3.7 cm  LVPWd: 0.99 cm  FS: 3.1 %  LV mass(C)d: 109.2 grams  LV mass(C)dI: 64.9 grams/m2  RWT: 0.52     ______________________________________________________________________________  Report approved by: Nakia Gaines 09/06/2022 02:57 PM

## 2022-09-09 ENCOUNTER — PRE VISIT (OUTPATIENT)
Dept: OTOLARYNGOLOGY | Facility: CLINIC | Age: 62
End: 2022-09-09

## 2022-09-09 LAB
ANION GAP SERPL CALCULATED.3IONS-SCNC: 10 MMOL/L (ref 7–15)
BUN SERPL-MCNC: 7 MG/DL (ref 8–23)
CALCIUM SERPL-MCNC: 8.8 MG/DL (ref 8.8–10.2)
CHLORIDE SERPL-SCNC: 107 MMOL/L (ref 98–107)
CREAT SERPL-MCNC: 0.53 MG/DL (ref 0.67–1.17)
DEPRECATED HCO3 PLAS-SCNC: 21 MMOL/L (ref 22–29)
ERYTHROCYTE [DISTWIDTH] IN BLOOD BY AUTOMATED COUNT: 17.2 % (ref 10–15)
GFR SERPL CREATININE-BSD FRML MDRD: >90 ML/MIN/1.73M2
GLUCOSE SERPL-MCNC: 117 MG/DL (ref 70–99)
HCT VFR BLD AUTO: 28.4 % (ref 40–53)
HGB BLD-MCNC: 9.1 G/DL (ref 13.3–17.7)
INR PPP: 2.18 (ref 0.85–1.15)
MAGNESIUM SERPL-MCNC: 1.9 MG/DL (ref 1.7–2.3)
MCH RBC QN AUTO: 29.9 PG (ref 26.5–33)
MCHC RBC AUTO-ENTMCNC: 32 G/DL (ref 31.5–36.5)
MCV RBC AUTO: 93 FL (ref 78–100)
PLATELET # BLD AUTO: 211 10E3/UL (ref 150–450)
POTASSIUM SERPL-SCNC: 3.8 MMOL/L (ref 3.4–5.3)
RBC # BLD AUTO: 3.04 10E6/UL (ref 4.4–5.9)
SODIUM SERPL-SCNC: 138 MMOL/L (ref 136–145)
WBC # BLD AUTO: 5.6 10E3/UL (ref 4–11)

## 2022-09-09 PROCEDURE — 258N000003 HC RX IP 258 OP 636: Performed by: STUDENT IN AN ORGANIZED HEALTH CARE EDUCATION/TRAINING PROGRAM

## 2022-09-09 PROCEDURE — 93750 INTERROGATION VAD IN PERSON: CPT | Performed by: INTERNAL MEDICINE

## 2022-09-09 PROCEDURE — 80048 BASIC METABOLIC PNL TOTAL CA: CPT

## 2022-09-09 PROCEDURE — 85610 PROTHROMBIN TIME: CPT

## 2022-09-09 PROCEDURE — 99233 SBSQ HOSP IP/OBS HIGH 50: CPT | Mod: 25 | Performed by: INTERNAL MEDICINE

## 2022-09-09 PROCEDURE — 250N000013 HC RX MED GY IP 250 OP 250 PS 637: Performed by: INTERNAL MEDICINE

## 2022-09-09 PROCEDURE — 120N000003 HC R&B IMCU UMMC

## 2022-09-09 PROCEDURE — 250N000012 HC RX MED GY IP 250 OP 636 PS 637

## 2022-09-09 PROCEDURE — 83735 ASSAY OF MAGNESIUM: CPT | Performed by: INTERNAL MEDICINE

## 2022-09-09 PROCEDURE — 85027 COMPLETE CBC AUTOMATED: CPT

## 2022-09-09 PROCEDURE — 36415 COLL VENOUS BLD VENIPUNCTURE: CPT

## 2022-09-09 PROCEDURE — 250N000013 HC RX MED GY IP 250 OP 250 PS 637

## 2022-09-09 RX ORDER — WARFARIN SODIUM 5 MG/1
5 TABLET ORAL
Status: COMPLETED | OUTPATIENT
Start: 2022-09-09 | End: 2022-09-09

## 2022-09-09 RX ORDER — POTASSIUM CHLORIDE 750 MG/1
20 TABLET, EXTENDED RELEASE ORAL ONCE
Status: COMPLETED | OUTPATIENT
Start: 2022-09-09 | End: 2022-09-09

## 2022-09-09 RX ORDER — MAGNESIUM OXIDE 400 MG/1
400 TABLET ORAL EVERY 4 HOURS
Status: COMPLETED | OUTPATIENT
Start: 2022-09-09 | End: 2022-09-09

## 2022-09-09 RX ADMIN — SODIUM CHLORIDE, POTASSIUM CHLORIDE, SODIUM LACTATE AND CALCIUM CHLORIDE 1000 ML: 600; 310; 30; 20 INJECTION, SOLUTION INTRAVENOUS at 18:42

## 2022-09-09 RX ADMIN — QUETIAPINE 25 MG: 25 TABLET ORAL at 13:38

## 2022-09-09 RX ADMIN — HYDROXYCHLOROQUINE SULFATE 200 MG: 200 TABLET, FILM COATED ORAL at 08:00

## 2022-09-09 RX ADMIN — VANCOMYCIN HYDROCHLORIDE 125 MG: 125 CAPSULE ORAL at 20:02

## 2022-09-09 RX ADMIN — MYCOPHENOLATE MOFETIL 1500 MG: 500 TABLET, FILM COATED ORAL at 20:03

## 2022-09-09 RX ADMIN — PANTOPRAZOLE SODIUM 40 MG: 40 TABLET, DELAYED RELEASE ORAL at 07:58

## 2022-09-09 RX ADMIN — QUETIAPINE 25 MG: 25 TABLET ORAL at 08:00

## 2022-09-09 RX ADMIN — DIGOXIN 125 MCG: 125 TABLET ORAL at 07:59

## 2022-09-09 RX ADMIN — Medication 400 MG: at 16:02

## 2022-09-09 RX ADMIN — Medication 150 MG: at 21:13

## 2022-09-09 RX ADMIN — ATORVASTATIN CALCIUM 40 MG: 40 TABLET, FILM COATED ORAL at 20:02

## 2022-09-09 RX ADMIN — MYCOPHENOLATE MOFETIL 1500 MG: 500 TABLET, FILM COATED ORAL at 08:00

## 2022-09-09 RX ADMIN — THIAMINE HCL TAB 100 MG 100 MG: 100 TAB at 08:01

## 2022-09-09 RX ADMIN — VANCOMYCIN HYDROCHLORIDE 125 MG: 125 CAPSULE ORAL at 11:20

## 2022-09-09 RX ADMIN — WARFARIN SODIUM 5 MG: 5 TABLET ORAL at 18:09

## 2022-09-09 RX ADMIN — HYDROXYCHLOROQUINE SULFATE 200 MG: 200 TABLET, FILM COATED ORAL at 20:02

## 2022-09-09 RX ADMIN — Medication 1 TABLET: at 07:59

## 2022-09-09 RX ADMIN — POTASSIUM CHLORIDE 20 MEQ: 750 TABLET, EXTENDED RELEASE ORAL at 11:20

## 2022-09-09 RX ADMIN — Medication 400 MG: at 11:20

## 2022-09-09 RX ADMIN — VANCOMYCIN HYDROCHLORIDE 125 MG: 125 CAPSULE ORAL at 16:02

## 2022-09-09 RX ADMIN — SODIUM CHLORIDE, POTASSIUM CHLORIDE, SODIUM LACTATE AND CALCIUM CHLORIDE 1000 ML: 600; 310; 30; 20 INJECTION, SOLUTION INTRAVENOUS at 13:37

## 2022-09-09 RX ADMIN — ACETAMINOPHEN 975 MG: 325 TABLET, FILM COATED ORAL at 09:05

## 2022-09-09 RX ADMIN — VANCOMYCIN HYDROCHLORIDE 125 MG: 125 CAPSULE ORAL at 08:01

## 2022-09-09 RX ADMIN — SERTRALINE HYDROCHLORIDE 100 MG: 100 TABLET ORAL at 08:00

## 2022-09-09 RX ADMIN — ACETAMINOPHEN 975 MG: 325 TABLET, FILM COATED ORAL at 18:08

## 2022-09-09 ASSESSMENT — ACTIVITIES OF DAILY LIVING (ADL)
ADLS_ACUITY_SCORE: 31
ADLS_ACUITY_SCORE: 33
ADLS_ACUITY_SCORE: 32
ADLS_ACUITY_SCORE: 33
ADLS_ACUITY_SCORE: 31
ADLS_ACUITY_SCORE: 33
ADLS_ACUITY_SCORE: 32
ADLS_ACUITY_SCORE: 31
ADLS_ACUITY_SCORE: 33

## 2022-09-09 NOTE — PLAN OF CARE
Goal Outcome Evaluation:    Plan of Care Reviewed With: patient     Overall Patient Progress: no change    Outcome Evaluation: Encourage pt to order meals TID. High protein snack between meals to optimize intake.    Luis Miller RDN, LD, CNSC  [address] pager: [phone]   [address] Phone: [phone]

## 2022-09-09 NOTE — PLAN OF CARE
"BP 91/74 (BP Location: Left arm)   Pulse 100   Temp 98  F (36.7  C) (Oral)   Resp 20   Ht 1.702 m (5' 7\")   Wt 59.7 kg (131 lb 9.8 oz)   SpO2 99%   BMI 20.61 kg/m      SR-ST (90's-low 100's) on tele. BBB present with first degree AV block. Afebrile. Room air. Alert and oriented x 4 and pleasant. LVAD with no alarms overnight. Dressing c/d/I. Denies pain. Tolerating diet with no n/v. Large UOP per urinal. No BM overnight. Right PIV saline locked. Repositioning self in bed. Up with 1-2 assist and walker. Refused a full skin assessment at 4AM. Patient refused a standing weight until later in morning. Bed scale inaccurate. Continue to monitor.  "

## 2022-09-09 NOTE — PLAN OF CARE
Goal Outcome Evaluation:    Plan of Care Reviewed With: patient     Overall Patient Progress: improving         B/P: 103/87, T: 97.8, P: 89, R: 16  Neuro: A&Ox4.   Cardiac: SR BBB 1st degree block. VSS. MAP 90 automated and doppler. Low Flow alarm x1 see note.   Respiratory: Sating 100% on RA.  GI/: Adequate urine output. BM X2 incontinent and watery.   Diet/appetite: Tolerating 2g Na diet and 2L FR. Eating well.  Activity:  Assist of 1, up to chair. PT/OT ordered   Pain: Denies.   Skin: No new deficits noted.  LDA's: PIV x1.     Plan: Liter LR bolus ordered and infusing. LVAD numbers improving. Continue with POC. Notify primary team with changes.

## 2022-09-09 NOTE — PROGRESS NOTES
CLINICAL NUTRITION SERVICES - ASSESSMENT NOTE     Nutrition Prescription    RECOMMENDATIONS FOR MDs/PROVIDERS TO ORDER:  Recommend liberalizing diet order to optimize nutrition, as able. Would suggest at least liberalizing to 3 gm sodium diet.     Malnutrition Status:    Severe malnutrition in the context of chronic illness     Recommendations already ordered by Registered Dietitian (RD):  - Special K bar x 2 at 10 AM  - Additional supplements by request, PRN  - Remind patient to schedule breakfast for next day when taking dinner orders, per need to consume food with medications (discussed with nutrition staff)     Future/Additional Recommendations:  Monitor/encourage PO intake, TID meal frequency, adjust ONS/snacks if desired  Monitor wt trends for stability and overall POC     REASON FOR ASSESSMENT  Dandy Sands is a/an 61 year old male assessed by the dietitian for Nutrition Risk Monitoring     CLINICAL HISTORY  Hx of SLE, antiphospholipid syndrome, PE, HFrEF due to ICM, HLD, recent admission to [facility] for decompensated HF 2/2 cardiogenic shock c/b multiorgan failure requiring IABP, is s/p HM 3 LVAD on 7/8/2022. Hospitalization was also c/b RV, had 29F Protek duo via RIJ, RVAD removed 7/21, hemopericardium requiring repeat washout, chest was closed 7/13. He then developed epistaxis on 8/6 ENT. Patient has h/o nasal perforation that required elective procedure (in [place]) which was postponed due to LVAD insertion.  He required intubation 8/7 for airway protection, s/p extubated 8/8. Nasal pack removed 8/9. Now admitted for epistaxis rebleeding after being discharged from ARU on 9/6. C-diff infection noted.     NUTRITION HISTORY  Pt with multiple prolonged hospital admissions, impacting nutrition status. Had been on TFs for nutrition support previously but was able to wean TFs and go back to his PO diet with intake of oral nutrition supplements. Endorses having a severe weight loss since ~March 2022 (states he  "used to weigh 180 lbs) but has started to slow down/stabilize as his PO improves. Is willing to set up high protein snacks between meals to optimize his intake; otherwise thinks he will be able to eat adequate amounts PO. Was also accepting of RD adding a PO supplement order.    CURRENT NUTRITION ORDERS  Diet: 2 g Sodium, 2L fluid restriction   Intake/Tolerance: 100% intake x2 meals, 25% intake of 1 meal so far. Encouraged PO intake.     GI    1-3 BMs per day per I/O; C-diff positive 9/7    LABS    BUN 7 (LOW) likely indicator of inadequate protein intake    Cr 0.53 (low), likely indicator of lean body mass preservation    BG levels reviewed, acceptable for acute care setting (acceptable BG range for hospital setting is 140-180 mg/dL per ADA for most patients)  o A1C 5.5% on 5/20/22 (WNL), no recent checks    MEDICATIONS    Lipitor    Thera-Vit-M     Mycophenolate    Protonix    Potassium Chloride 60 mEq    B1/Thiamine 100 mg/day    Warfarin    SKIN    No recent WOCN notes for present admission.   o Last note 8/16 for R temporal wound (DTPI), at that time status noted as healing     ANTHROPOMETRICS  Height: 170.2 cm (5' 7\")  Most Recent Weight: 59.7 kg (131 lbs) 9/8 - standing scale   IBW: 67.3 kg  89%IBW   BMI: Normal BMI    Weight History:   - Weight down 6.1 kg (9.3%) since 6/30; severe loss for time frame of ~2.5 months (possibly at least partially r/t fluid status)   - UBW reported as previously being 180 lbs in March; unable to verify this (limited wt trends)  Wt Readings from Last 15 Encounters:   09/08/22 59.7 kg (131 lb 9.8 oz)   09/04/22 59.1 kg (130 lb 6.4 oz)   08/21/22 59.3 kg (130 lb 12.8 oz)   07/08/22 62.6 kg (138 lb)   06/30/22 65.8 kg (145 lb)   05/29/22 65 kg (143 lb 4.8 oz)   05/26/22 64.6 kg (142 lb 8 oz)     Dosing Weight: 60 kg (admit wt, rounded; IBW of 67.3 kg)    ASSESSED NUTRITION NEEDS  Estimated Energy Needs: 3971-6861 kcals/day (30-35 kcals/kg)  Justification: Increased needs per acute " illness  Estimated Protein Needs:  grams protein/day (1.5-2 grams of pro/kg)  Justification: Increased needs post-op LVAD and for pressure injury healing  Estimated Fluid Needs: 8920-9711 mL/day (1 mL/kcal)   Justification: Maintenance, or per provider pending fluid status     PHYSICAL FINDINGS  See malnutrition section below.    MALNUTRITION  % Intake: < 75% for >/= 3 months (moderate)  % Weight Loss: > 7.5% in 3 months (severe)  Subcutaneous Fat Loss: Severe, global  Muscle Loss: Severe, global  Fluid Accumulation/Edema: None noted  Malnutrition Diagnosis: Severe malnutrition in the context of chronic illness     NUTRITION DIAGNOSIS  Increased nutrient needs (energy/protein) related to repletion and preservation of lean body mass and recent LVAD as evidenced by est nutrition needs 30-35 Kcal/kg and est protein need 1.5-2 gm/kg.     INTERVENTIONS  Implementation  Nutrition education for nutrition relationship to health/disease   Medical food supplement therapy     Goals  Patient to consume % of nutritionally adequate meal trays TID, or the equivalent with supplements/snacks.     Monitoring/Evaluation  Progress toward goals will be monitored and evaluated per protocol.  Luis Miller, MCKINLEY, LD, CNSC  6B RD pager: [phone]   6B work-room RD phone: *[phone]    Weekend/Holiday RD pager [phone]

## 2022-09-09 NOTE — PROGRESS NOTES
[facility]  Cardiology History and Physical - Cardiology    Date of Admission:  9/6/2022    Assessment & Plan: MARISOL Dorman EDUARD Sands is a 61 year old male admitted on 9/5/2022. He is a 61M with pmhx of SLE, antiphospholipid syndrome, hx of history pulmonary embolism (on anticoagulation with warfarin), HFrEF due to ICM, HLD. He initially presented to [facility] on 6/13 due to nausea, vomiting, abdominal bloating and transferred to [facility] for admission on 6/17/2022 for decompensated heart failure 2/2 cardiogenic shock c/b multiorgan failure requiring IABP, is s/p HM 3 LVAD on 7/8/2022. Hospitalization was also c/b RV, had 29F Protek duo via RIJ, RVAD removed 7/21, hemopericardium requiring repeat washout, chest was closed 7/13. He then developed epistaxis on 8/6 ENT. Patient has h/o nasal perforation that required elective procedure (in [place]) which was postponed due to LVAD insertion.  He required intubation 8/7 for airway protection, s/p extubated 8/8. Nasal pack removed 8/9. Patient recently discharged on 09/05 for epistaxis after 1h after taking warfarin.  Now admitted with c diff diarrhea and low flow alarms.     Changes today  -continuing oral vancomycin and holding his home hydralazine and lisinopril     # Clostridium difficile infection, mild  # Low PI events  # Non-anion gap metabolic acidosis  # Moderate dehydration  1-week of diarrheas x5/day. Has had low PI events over the past 24 hrs (~2.1-2.4) No mucus or bleeding. Denies fever, night sweats, or abdominal pain. Afebrile on admission. Physical exam with dry mucous membranes. Negative enteropathogen panel.  - IVF    > 1L NaCl 0.9% over 4h on 09/06   > 1.5 L LR over 4h twice on 09/07 (to ameliorate acidosis)   > 1.5 L LR over 4h on 09/08  - Vancomycin 125mcg QID x10 days (09/08-P)    #HFrEF 2/2 ICM  in setting of cardiogenic shock (SCAI D) s/p HM3 LVAD  #RV failure s/p Protek duo temporary  RVAD s/p decannulation 7/21  #RV thrombus  #Post chest closure hemopericardium s/p repeat washout (7/12)  #PMH CAD s/p PCI to LAD (2005)  #S/p CRT-D (2006)  Patient with ICM s/p HM3 LVAD 7/8/22 2/2 cardiogenic shock requiring MCS with IABP as prior to HM (initially evaluated for heart transplant, however noted to have substantially elevated DSAs during course of care, so decision made to proceed with LVAD given patient was already on temporary MCS). Intraoperative course during LVAD placement was c/b RVF resulting in cardiogenic shock requiring high dose pressors necessitating temporary RVAD support. Initial post-op course c/b hemopericardium requiring repeat washout with chest left open, with good recovery extubation and decannulation on 7/21. Patient tolerating hemodynamics and end organ standpoint well. He has had intermittent low flow alarms with high PI, no power spike, and a closed aortic valve on TTE. TTE also showed underfilling LV which could explain his low flow alarms d/t suckdown event so his LVAD speed was reduced to 5200. Patient did have a low flow alarm on 8/20 @ 1145 (PI 8-9 and not hypotensive); CTA chest done which showed that the LVAD outflow tract is widely patent. Patient is euvolemic on exam, but at times has had orthostasis symptoms; patient encouraged to stay hydrated within his fluid/diet restriction guidelines (no more than 2L/day of fluid intake and no more than 3g of Na per day in dietary intake).     - interrogation showed 2 low flow alarms in the past 24 hrs before epistaxis (has had at least 6 low flow alarms over the past 5 days, has hx of hemorrhoidal bleeding x1 and soft stools)              > 500cc NaCl 0.9% over 2 hrs x1   > 1L NaCl 0.9% over 4hrs   - Volume status: Euvolemic              > Diuretics: Not needed at this time  - Afterload reduction: MAP goal 65-80   >  Holding Hydralazine 37.5 TID    > Holding Lisinopril 2.5 qday  - Statin: Atorvastatin 40mg  - BB: Holding in  setting of recent cardiogenic shock and orthostatic symptoms, consider restarting outpatient  - AA: Holding in setting of recent cardiogenic shock and orthostatic symptoms, consider restarting outpatient  - SGLTi: As outpatient, has been held due to immunosuppression he is on for his SLE.  - Anticoagulation: Continue PTA Warfarin   - Antiplatelets: Holding PTA ASA 81mg qday given hx of epistaxis  -       #Epistaxis, improved  # Hx Intubated due to Concern for airway protection (8/7)-Extubated (8/8)  # Mild-moderate emphysema  # History of COVID-19  # Iatrogenic R apical PTX  H/o nasal perforation  For elective procedure (in [place]), postponed 2/2 LVAD insertion. Epistaxis not fixed by packing overnight on 8/6 and pt continued to bleed. Intubated for airway protection. Controlled at bedside by ENT s/p cautery and packing. Extubated 8/8. Had Cefepime/Vanc empirically for broad coverage for PNA (8/7-8/12). Patient removed his own nasal packing overnight on 8/9. Now admitted for profuse bleeding 1h after taking warfarin. ENT performed nasal package ~2h.  - started nasal saline today   - Follow with Dr Vazquez (EN) in 2 weeks   - Maintain nasal package       #GERD  #Hx of Hematemesis / oral mucosal bleeding   #Dysphagia  GI consulted during a prior admission 7/31 for black tarry stools and a possible GI bleed, however it is more likely swallowed blood from epistaxis that patient previously had. GI did not recommend an upper endoscopy. Recurrence of bloody stools likely due to epistaxis which have resolved. Hemoglobin was monitored and continued to be stable throughout the rest of the hospitalization. Cleared for regular diet by SLP.  - Na 2g diet      #Anemia due to blood loss from Epistaxis, stable  #History of DVT and PE   #Antiphospholipid antibody syndrome  #History of iron deficiency anemia  Hb stable 11.1  - Transfuse if Hb<7 or actively bleeding  - Trend Hb     #SLE  - Holding PTA meds:  hydroxychloroquine 200mg (held before and resumed 7/16)    Diet:  Na 2g diet  DVT Prophylaxis: Warfarin   Fitzgerald Catheter: Not present  Code Status: Full Code  Fluids: Lactated Ringer 1.5L x2   Lines: PIV     Disposition Plan   Expected discharge: 2 - 3 days, recommended to prior living arrangement once fluid volume status optimized on oral medication.    Entered: Keily Bowles MD 09/07/2022, 2:17 PM     The patient's care was discussed with the Attending Physician, Dr. Lora.    Ceferino Thomas MD  Cardiology Fellow  Pager: [phone]   ________________________________________________  Chief Complaint   Loose stools    Interval Progress  Doing well, no complaints. On vancomycin. Stool frequency slowing.     Review of Systems    The 10 point Review of Systems is negative other than noted in the HPI or here.   Prior to Admission Medications   Prior to Admission Medications   Prescriptions Last Dose Informant Patient Reported? Taking?   Melatonin 10 MG TABS tablet   No No   Sig: Take 1 tablet (10 mg) by mouth At Bedtime for 30 days   QUEtiapine (SEROQUEL) 25 MG tablet   No No   Sig: Take 1 tablet (25 mg) by mouth 2 times daily for 30 days   QUEtiapine (SEROQUEL) 50 MG tablet   No No   Sig: Take 3 tablets (150 mg) by mouth At Bedtime for 30 days   acetaminophen (TYLENOL) 325 MG tablet   No No   Sig: Take 3 tablets (975 mg) by mouth every 8 hours for 30 days   atorvastatin (LIPITOR) 40 MG tablet   No No   Sig: Take 1 tablet (40 mg) by mouth every evening for 30 days   digoxin (LANOXIN) 125 MCG tablet   No No   Sig: Take 1 tablet (125 mcg) by mouth daily for 30 days   fluticasone-vilanterol (BREO ELLIPTA) 100-25 MCG/INH inhaler   No No   Sig: Inhale 1 puff into the lungs daily for 30 days   hydrALAZINE (APRESOLINE) 50 MG tablet   No No   Sig: Take 1 tablet (50 mg) by mouth 3 times daily for 30 days   hydrOXYzine (ATARAX) 25 MG tablet   No No   Sig: Take 1 tablet (25 mg) by mouth every 6 hours as needed for anxiety  (ANXIETY)   hydrocortisone 1 % CREA cream   No No   Sig: Place rectally 3 times daily for 2 days   hydroxychloroquine (PLAQUENIL) 200 MG tablet   Yes No   Sig: Take 200 mg by mouth 2 times daily   lisinopril (ZESTRIL) 10 MG tablet   No No   Sig: Take 1 tablet (10 mg) by mouth daily for 30 days   melatonin 3 MG tablet   Yes No   Sig: Take 6 mg by mouth nightly as needed for sleep   multivitamin w/minerals (THERA-VIT-M) tablet   No No   Sig: Take 1 tablet by mouth daily   mycophenolate (GENERIC EQUIVALENT) 500 MG tablet   No No   Sig: Take 3 tablets (1,500 mg) by mouth 2 times daily for 30 days   pantoprazole (PROTONIX) 40 MG EC tablet   No No   Sig: Take 1 tablet (40 mg) by mouth daily   pramox-pe-glycerin-petrolatum (PREPARATION H) 1-0.25-14.4-15 % CREA cream   No No   Sig: Place rectally 2 times daily as needed for hemorrhoids Apply immediately after a bowel movement.   promethazine (PHENERGAN) 12.5 MG tablet   No No   Sig: Take 1 tablet (12.5 mg) by mouth every 6 hours as needed for nausea or vomiting   sertraline (ZOLOFT) 100 MG tablet   No No   Sig: Take 1 tablet (100 mg) by mouth daily for 30 days   thiamine (B-1) 100 MG tablet   No No   Sig: Take 1 tablet (100 mg) by mouth daily   warfarin ANTICOAGULANT (COUMADIN) 2.5 MG tablet   No No   Sig: Take 2 tabs (5mg) daily at bedtime. Future dose adjustments pending INR   zolpidem (AMBIEN) 5 MG tablet   Yes No   Sig: Take 5 mg by mouth nightly as needed for sleep      Facility-Administered Medications: None     Allergies   No Known Allergies    Physical Exam   Vital Signs: Temp: 97.7  F (36.5  C) Temp src: Temporal BP: 100/84 Pulse: 92   Resp: 21 SpO2: 100 % O2 Device: None (Room air)    Weight: 130 lbs 0 oz    Constitutional: awake, alert, cooperative, no apparent distress, appears dry  Hematologic / Lymphatic: no cervical lymphadenopathy and no supraclavicular lymphadenopathy  Respiratory: No increased work of breathing, good air exchange, clear to auscultation  bilaterally, no crackles or wheezing  Cardiovascular: LVAD hum   Abdomen: soft nt nd, no guarding   Musculoskeletal: Normal capillary refill, warm to touch extremities.   Neurologic: Awake, alert, oriented to name, place and time.  Cranial nerves II-XII are grossly intact.  No focal deficit.     Data   Data reviewed today: I reviewed all medications, new labs and imaging results over the last 24 hours. I personally reviewed Cardiology specific data review: the EKG tracing showing no ischemic ST/T changes. Sinus tachycardia     Recent Labs   Lab 09/07/22  0549 09/06/22  1333 09/05/22  0617 09/04/22  2333 09/04/22  2234 09/04/22  0601 09/01/22  0606   WBC 5.5 7.5 7.0  --  6.7  --   --    HGB 9.3* 10.4* 9.4*  --  11.1* 11.1*  --    MCV 93 93 98  --  92  --   --     308 235  --  307  --   --    INR  --  2.49*  --  2.64*  --  2.69*  --     138 135*  --  137  --   --    POTASSIUM 3.8 4.1 4.1  --  4.2  --    < >   CHLORIDE 108* 106 105  --  106  --    < >   CO2 18* 21* 18*  --  16*  --    < >   BUN 11.3 15.0 24.2*  --  17.6  --    < >   CR 0.57* 0.79 0.50*  --  0.59*  --   --    ANIONGAP 11 11 12  --  15  --    < >   ELDA 9.0 9.8 9.1  --  9.5  --   --    GLC 83 112* 97  --  112*  --    < >   ALBUMIN  --  4.1  --   --  3.8  --   --    PROTTOTAL  --  7.7  --   --  7.7  --   --    BILITOTAL  --  0.4  --   --  0.4  --   --    ALKPHOS  --  136*  --   --  133*  --   --    ALT  --  20  --   --  15  --   --    AST  --  30  --   --  42  --   --     < > = values in this interval not displayed.     Most Recent 3 CBC's:  Recent Labs   Lab Test 09/07/22  0549 09/06/22  1333 09/05/22  0617   WBC 5.5 7.5 7.0   HGB 9.3* 10.4* 9.4*   MCV 93 93 98    308 235     Most Recent 3 BMP's:  Recent Labs   Lab Test 09/07/22  0549 09/06/22  1333 09/05/22  0617    138 135*   POTASSIUM 3.8 4.1 4.1   CHLORIDE 108* 106 105   CO2 18* 21* 18*   BUN 11.3 15.0 24.2*   CR 0.57* 0.79 0.50*   ANIONGAP 11 11 12   ELDA 9.0 9.8 9.1   GLC 83  112* 97     Most Recent 2 LFT's:  Recent Labs   Lab Test 09/06/22  1333 09/04/22  2234   AST 30 42   ALT 20 15   ALKPHOS 136* 133*   BILITOTAL 0.4 0.4     Most Recent 3 INR's:  Recent Labs   Lab Test 09/06/22  1333 09/04/22  2333 09/04/22  0601   INR 2.49* 2.64* 2.69*     Anticoagulation Dose History     Recent Dosing and Labs Latest Ref Rng & Units 8/31/2022 9/1/2022 9/2/2022 9/3/2022 9/4/2022 9/4/2022 9/6/2022    Warfarin 3 mg - 6 mg - - - - - -    Warfarin 5 mg - - - 5 mg - - - -    Warfarin 7.5 mg - - 7.5 mg - 7.5 mg - - -    [id number] 70 - 130 % - - - - - - -    INR 0.85 - 1.15 2.53(H) 2.25(H) 2.56(H) 2.64(H) 2.69(H) 2.64(H) 2.49(H)          Most Recent 3 Troponin's:No lab results found.  Most Recent 3 BNP's:  Recent Labs   Lab Test 09/04/22  2234 06/17/22  1746 05/27/22  2215   NTBNPI 928* 4,611* 2,006*     Most Recent D-dimer:  Recent Labs   Lab Test 09/06/22  1333   DD 1.78*     Most Recent Cholesterol Panel:  Recent Labs   Lab Test 07/21/22  0331 07/11/22  0337 06/18/22  0334   CHOL  --   --  79   LDL  --   --  38   HDL  --   --  30*   TRIG 110   < > 53    < > = values in this interval not displayed.     Most Recent Hemoglobin A1c:  Recent Labs   Lab Test 05/20/22  0516   A1C 5.5     Most Recent 6 glucoses:  Recent Labs   Lab Test 09/07/22  0549 09/06/22  1333 09/05/22  0617 09/04/22  2234 09/01/22  0606 08/29/22  0804   GLC 83 112* 97 112* 94 104*     Most Recent Urinalysis:  Recent Labs   Lab Test 08/31/22  1542   COLOR Yellow   APPEARANCE Clear   URINEGLC Negative   URINEBILI Negative   URINEKETONE Negative   SG 1.014   UBLD Negative   URINEPH 5.5   PROTEIN 30 *   NITRITE Negative   LEUKEST Negative   RBCU <1   WBCU 4     Most Recent ESR & CRP:  Recent Labs   Lab Test 09/04/22  2234 08/03/22  0606 06/19/22  1522   SED  --   --  39*   CRP 13.50*   < > 28.0*    < > = values in this interval not displayed.     Most Recent Anemia Panel:  Recent Labs   Lab Test 09/07/22  0549 06/24/22  1711 06/24/22  1005    WBC 5.5   < >  --    HGB 9.3*   < >  --    HCT 29.8*   < >  --    MCV 93   < >  --       < >  --    IRON  --   --  42*   IRONSAT  --   --  16   FEB  --   --  260   SHAWN  --   --  365    < > = values in this interval not displayed.     Recent Results (from the past 24 hour(s))   Echocardiogram Complete   Result Value    LVEF  10-15% (severely reduced)    [facility]    [id number]  [id number]  [id number]  [id number]^ZELDA^ADRIÁN^MARCUS     [facility],[place]  Echocardiography Laboratory  [address] [place] [address]     Name: OLEG KAUR  MRN: [id number]  : 1960  Study Date: 2022 01:44 PM  Age: 61 yrs  Gender: Male  Patient Location: [facility]  Reason For Study: Chronic systolic congestive heart failure (H), LVAD (left  ventri  Ordering Physician: ADRIÁN NDIAYE  Referring Physician: ADRIÁN NDIAYE  Performed By: Chantal Malin     BSA: 1.7 m2  Height: 67 in  Weight: 130 lb  ______________________________________________________________________________  ______________________________________________________________________________  Interpretation Summary  HM3 at 5200 rpm.  At baseline -  Left ventricular function is decreased. The ejection fraction is 10-15%  (severely reduced). Septum is midline.  LVAD cannula was seen in the expected anatomic position in the LV apex.  Mild right ventricular dilation is present. Global right ventricular function  is mildly reduced.  The AV remains closed throughout the cardiac cycle. There is mild, continuous  aortic regurgitation.  The inferior vena cava was normal in size with preserved respiratory  variability.  No pericardial effusion is present.     Please refer to the EPIC report for measurements performed at different LVAD  speed settings.  ______________________________________________________________________________  Left Ventricle  Left ventricular function is decreased. The ejection fraction is 10-15%  (severely  reduced). Please refer to the EPIC report for measurements performed  at different LVAD speed settings. LVAD cannula was seen in the expected  anatomic position in the LV apex. Septum is midline.     Right Ventricle  Mild right ventricular dilation is present. Global right ventricular function  is mildly reduced.     Aortic Valve  The AV remains closed throughout the cardiac cycle. There is mild, continuous  aortic regurgitation.     Tricuspid Valve  Mild tricuspid insufficiency is present.     Vessels  The inferior vena cava was normal in size with preserved respiratory  variability.     Pericardium  No pericardial effusion is present.  ______________________________________________________________________________  MMode/2D Measurements & Calculations  IVSd: 0.88 cm  LVIDd: 3.9 cm  LVIDs: 3.7 cm  LVPWd: 0.99 cm  FS: 3.1 %  LV mass(C)d: 109.2 grams  LV mass(C)dI: 64.9 grams/m2  RWT: 0.52     ______________________________________________________________________________  Report approved by: Nakia Gaines 09/06/2022 02:57 PM

## 2022-09-09 NOTE — ADDENDUM NOTE
Addendum  created 09/09/22 1254 by Behrens, Christopher J, MD    Attestation recorded in Intraprocedure, Intraprocedure Attestations filed

## 2022-09-09 NOTE — PROGRESS NOTES
D: Stopped by to see patient. No VAD related questions or concerns at this time.   I: Discussed POC and provided support and listened to patient and caregiver's thoughts and concerns.  P: Continue to follow patient and address any questions or concerns patient and or caregiver may have.

## 2022-09-10 ENCOUNTER — APPOINTMENT (OUTPATIENT)
Dept: GENERAL RADIOLOGY | Facility: CLINIC | Age: 62
DRG: 371 | End: 2022-09-10
Payer: COMMERCIAL

## 2022-09-10 ENCOUNTER — APPOINTMENT (OUTPATIENT)
Dept: PHYSICAL THERAPY | Facility: CLINIC | Age: 62
DRG: 371 | End: 2022-09-10
Attending: INTERNAL MEDICINE
Payer: COMMERCIAL

## 2022-09-10 LAB
ALBUMIN UR-MCNC: NEGATIVE MG/DL
APPEARANCE UR: CLEAR
BILIRUB UR QL STRIP: NEGATIVE
COLOR UR AUTO: NORMAL
GLUCOSE UR STRIP-MCNC: NEGATIVE MG/DL
HGB UR QL STRIP: NEGATIVE
INR PPP: 2.1 (ref 0.85–1.15)
KETONES UR STRIP-MCNC: NEGATIVE MG/DL
LEUKOCYTE ESTERASE UR QL STRIP: NEGATIVE
NITRATE UR QL: NEGATIVE
PH UR STRIP: 5.5 [PH] (ref 5–7)
SP GR UR STRIP: 1.01 (ref 1–1.03)
UROBILINOGEN UR STRIP-MCNC: NORMAL MG/DL

## 2022-09-10 PROCEDURE — 85610 PROTHROMBIN TIME: CPT | Performed by: INTERNAL MEDICINE

## 2022-09-10 PROCEDURE — 99221 1ST HOSP IP/OBS SF/LOW 40: CPT | Mod: GC | Performed by: INTERNAL MEDICINE

## 2022-09-10 PROCEDURE — 99233 SBSQ HOSP IP/OBS HIGH 50: CPT | Mod: 25 | Performed by: INTERNAL MEDICINE

## 2022-09-10 PROCEDURE — 71045 X-RAY EXAM CHEST 1 VIEW: CPT

## 2022-09-10 PROCEDURE — 36415 COLL VENOUS BLD VENIPUNCTURE: CPT | Performed by: INTERNAL MEDICINE

## 2022-09-10 PROCEDURE — 71045 X-RAY EXAM CHEST 1 VIEW: CPT | Mod: 26 | Performed by: STUDENT IN AN ORGANIZED HEALTH CARE EDUCATION/TRAINING PROGRAM

## 2022-09-10 PROCEDURE — 250N000013 HC RX MED GY IP 250 OP 250 PS 637

## 2022-09-10 PROCEDURE — 250N000012 HC RX MED GY IP 250 OP 636 PS 637

## 2022-09-10 PROCEDURE — 81003 URINALYSIS AUTO W/O SCOPE: CPT

## 2022-09-10 PROCEDURE — 258N000003 HC RX IP 258 OP 636

## 2022-09-10 PROCEDURE — 120N000003 HC R&B IMCU UMMC

## 2022-09-10 PROCEDURE — 97161 PT EVAL LOW COMPLEX 20 MIN: CPT | Mod: GP | Performed by: PHYSICAL THERAPIST

## 2022-09-10 PROCEDURE — 250N000013 HC RX MED GY IP 250 OP 250 PS 637: Performed by: INTERNAL MEDICINE

## 2022-09-10 PROCEDURE — 93750 INTERROGATION VAD IN PERSON: CPT | Performed by: INTERNAL MEDICINE

## 2022-09-10 PROCEDURE — 999N000111 HC STATISTIC OT IP EVAL DEFER: Performed by: OCCUPATIONAL THERAPIST

## 2022-09-10 PROCEDURE — 97110 THERAPEUTIC EXERCISES: CPT | Mod: GP | Performed by: PHYSICAL THERAPIST

## 2022-09-10 RX ORDER — WARFARIN SODIUM 7.5 MG/1
7.5 TABLET ORAL
Status: COMPLETED | OUTPATIENT
Start: 2022-09-10 | End: 2022-09-10

## 2022-09-10 RX ORDER — HYDRALAZINE HYDROCHLORIDE 50 MG/1
50 TABLET, FILM COATED ORAL 3 TIMES DAILY
Status: DISCONTINUED | OUTPATIENT
Start: 2022-09-10 | End: 2022-09-15 | Stop reason: HOSPADM

## 2022-09-10 RX ADMIN — ACETAMINOPHEN 975 MG: 325 TABLET, FILM COATED ORAL at 04:19

## 2022-09-10 RX ADMIN — HYDROXYCHLOROQUINE SULFATE 200 MG: 200 TABLET, FILM COATED ORAL at 09:18

## 2022-09-10 RX ADMIN — VANCOMYCIN HYDROCHLORIDE 125 MG: 125 CAPSULE ORAL at 09:19

## 2022-09-10 RX ADMIN — MYCOPHENOLATE MOFETIL 1500 MG: 500 TABLET, FILM COATED ORAL at 19:54

## 2022-09-10 RX ADMIN — SODIUM CHLORIDE, POTASSIUM CHLORIDE, SODIUM LACTATE AND CALCIUM CHLORIDE 1500 ML: 600; 310; 30; 20 INJECTION, SOLUTION INTRAVENOUS at 09:19

## 2022-09-10 RX ADMIN — THIAMINE HCL TAB 100 MG 100 MG: 100 TAB at 09:18

## 2022-09-10 RX ADMIN — WARFARIN SODIUM 7.5 MG: 7.5 TABLET ORAL at 17:30

## 2022-09-10 RX ADMIN — QUETIAPINE 25 MG: 25 TABLET ORAL at 09:18

## 2022-09-10 RX ADMIN — VANCOMYCIN HYDROCHLORIDE 125 MG: 125 CAPSULE ORAL at 16:09

## 2022-09-10 RX ADMIN — Medication 150 MG: at 21:08

## 2022-09-10 RX ADMIN — SERTRALINE HYDROCHLORIDE 100 MG: 100 TABLET ORAL at 09:18

## 2022-09-10 RX ADMIN — QUETIAPINE 25 MG: 25 TABLET ORAL at 13:50

## 2022-09-10 RX ADMIN — Medication 1 TABLET: at 09:18

## 2022-09-10 RX ADMIN — ACETAMINOPHEN 975 MG: 325 TABLET, FILM COATED ORAL at 13:09

## 2022-09-10 RX ADMIN — MYCOPHENOLATE MOFETIL 1500 MG: 500 TABLET, FILM COATED ORAL at 09:17

## 2022-09-10 RX ADMIN — VANCOMYCIN HYDROCHLORIDE 125 MG: 125 CAPSULE ORAL at 19:50

## 2022-09-10 RX ADMIN — VANCOMYCIN HYDROCHLORIDE 125 MG: 125 CAPSULE ORAL at 13:09

## 2022-09-10 RX ADMIN — HYDRALAZINE HYDROCHLORIDE 50 MG: 50 TABLET, FILM COATED ORAL at 21:11

## 2022-09-10 RX ADMIN — HYDROXYCHLOROQUINE SULFATE 200 MG: 200 TABLET, FILM COATED ORAL at 19:50

## 2022-09-10 RX ADMIN — HYDROXYZINE HYDROCHLORIDE 25 MG: 25 TABLET, FILM COATED ORAL at 00:08

## 2022-09-10 RX ADMIN — DIGOXIN 125 MCG: 125 TABLET ORAL at 09:18

## 2022-09-10 RX ADMIN — ACETAMINOPHEN 975 MG: 325 TABLET, FILM COATED ORAL at 17:30

## 2022-09-10 RX ADMIN — PANTOPRAZOLE SODIUM 40 MG: 40 TABLET, DELAYED RELEASE ORAL at 09:19

## 2022-09-10 RX ADMIN — Medication 3 MG: at 21:08

## 2022-09-10 RX ADMIN — ATORVASTATIN CALCIUM 40 MG: 40 TABLET, FILM COATED ORAL at 19:50

## 2022-09-10 ASSESSMENT — ACTIVITIES OF DAILY LIVING (ADL)
ADLS_ACUITY_SCORE: 32
ADLS_ACUITY_SCORE: 32
ADLS_ACUITY_SCORE: 36
ADLS_ACUITY_SCORE: 36
ADLS_ACUITY_SCORE: 32
ADLS_ACUITY_SCORE: 32
ADLS_ACUITY_SCORE: 34
ADLS_ACUITY_SCORE: 36
ADLS_ACUITY_SCORE: 32
ADLS_ACUITY_SCORE: 32
ADLS_ACUITY_SCORE: 34
ADLS_ACUITY_SCORE: 32

## 2022-09-10 NOTE — PLAN OF CARE
Neuro: A&Ox4.  Cardiac: SR with BBB and 1 deg AVB; on LVAD.   Respiratory: Sating >92% on RA.  GI/: Adequate urine output. Uses the urinal appropriately, had 1 episode of incontinent loose stool.  Diet/appetite: Tolerating 2g Na diet. 2L fluid restriction.  Activity:  Assist of 1 up to commode.  Pain: At acceptable level on current regimen.   Skin: Abrasion on mid back, dressing in place.  LDA's:  Left PIV, done with LR 1L, saline lock.  LVad dressing changed.      Plan: Continue to monitor LVAD numbers, improving.  Continue with POC. Notify primary team with changes.

## 2022-09-10 NOTE — PLAN OF CARE
Neuro: A&Ox4.   Cardiac: Heartmate 3, low flow alarm x1, see prior note. SR w/ first degree AV block & BBB, doppler MAPs 65-70. Max temp 100.2.   Respiratory: Sating >95% on RA.  GI/: Adequate urine output. No BM this shift.   Diet/appetite: Tolerating 2G NA diet, 2L FR.   Activity:  SBA up to bedside commode.   Pain: At acceptable level on current regimen.   Skin: No new deficits noted.   LDA's: PIV. LVAD.     Plan: Continue with POC. Notify primary team with changes.

## 2022-09-10 NOTE — PROGRESS NOTES
09/10/22 1606   Quick Adds   Type of Visit Initial PT Evaluation   Living Environment   People in Home alone   Current Living Arrangements house   Home Accessibility stairs to enter home   Number of Stairs, Main Entrance 2   Stair Railings, Main Entrance none   Transportation Anticipated family or friend will provide   Living Environment Comments Pt planning to discharge to [place] [facility] with assist from his son.   Self-Care   Usual Activity Tolerance moderate   Current Activity Tolerance fair   Regular Exercise No   Fall history within last six months yes   Number of times patient has fallen within last six months 4   Activity/Exercise/Self-Care Comment Pt recently discharged to [facility] from ARU, readmitted to hospital 2 after going to [facility]. Pt was planning on going to OP CR. Pt reports ambulating without assist in [place] [facility], using a FWW for community distance ambulatino.   General Information   Onset of Illness/Injury or Date of Surgery 09/09/22   Referring Physician Ceferino Thomas MD   Patient/Family Therapy Goals Statement (PT) go home   Pertinent History of Current Problem (include personal factors and/or comorbidities that impact the POC) Pt 61-year-old male with PMH of HFrEF 2/2 ICM, PE and APLS on warfarin, and SLE admitted on 9/5/2022 for epistaxis and diarrhea.   Existing Precautions/Restrictions sternal   General Observations Activity: UP ad dianna. HM3   Cognition   Affect/Mental Status (Cognition) WNL   Orientation Status (Cognition) oriented x 4   Posture    Posture Protracted shoulders;Forward head position   Range of Motion (ROM)   Range of Motion ROM is WNL   ROM Comment BLE WFL   Strength (Manual Muscle Testing)   Strength Comments BLE strength >3/5, no MMT completed, noted gerealized muslce wasting in LEs, pt reports LE fatigue as impaired compared to baseline.   Bed Mobility   Comment, (Bed Mobility) Supine>sitting with SBA   Transfers   Comment, (Transfers) Sit>stand  with SBA   Gait/Stairs (Locomotion)   Comment, (Gait/Stairs) Marches in place at bedside, declines ambulation out of room /22 diarrhea./   Balance   Balance Comments IND sitting balance, SBA-CGA for marchign at bedside, mild unsteadiness with dynamic balance.   Sensory Examination   Sensory Perception WNL   Clinical Impression   Criteria for Skilled Therapeutic Intervention Yes, treatment indicated   PT Diagnosis (PT) impiared functional mobility   Influenced by the following impairments LE weakness, decreased activity tolerance.   Functional limitations due to impairments difficulty with community distance mobility   Clinical Presentation (PT Evaluation Complexity) Stable/Uncomplicated   Clinical Presentation Rationale Clinical judgement   Clinical Decision Making (Complexity) low complexity   Planned Therapy Interventions (PT) balance training;gait training;home exercise program;postural re-education;stair training;strengthening;ROM (range of motion);transfer training;progressive activity/exercise;risk factor education;home program guidelines   PT Discharge Planning   PT Discharge Recommendation (DC Rec) home with outpatient physical therapy   PT Rationale for DC Rec safe to discharge to [place] apartments with assist from son, OP CR   PT Brief overview of current status SBA   Total Evaluation Time   Total Evaluation Time (Minutes) 6

## 2022-09-10 NOTE — PROGRESS NOTES
[facility]  Cardiology History and Physical - Cardiology    Date of Admission:  9/6/2022    Assessment & Plan: MARISOL Dorman EDUARD Sands is a 61 year old male admitted on 9/5/2022. He is a 61M with pmhx of SLE, antiphospholipid syndrome, hx of history pulmonary embolism (on anticoagulation with warfarin), HFrEF due to ICM, HLD. He initially presented to [facility] on 6/13 due to nausea, vomiting, abdominal bloating and transferred to [facility] for admission on 6/17/2022 for decompensated heart failure 2/2 cardiogenic shock c/b multiorgan failure requiring IABP, is s/p HM 3 LVAD on 7/8/2022. Hospitalization was also c/b RV, had 29F Protek duo via RIJ, RVAD removed 7/21, hemopericardium requiring repeat washout, chest was closed 7/13. He then developed epistaxis on 8/6 ENT. Patient has h/o nasal perforation that required elective procedure (in [place]) which was postponed due to LVAD insertion.  He required intubation 8/7 for airway protection, s/p extubated 8/8. Nasal pack removed 8/9. Patient recently discharged on 09/05 for epistaxis after 1h after taking warfarin.  Now admitted with c diff diarrhea and low flow alarms.     Changes today  - Continuing oral vancomycin and holding his home hydralazine and lisinopril   - Given 1.5L LR over 4 hrs given low flow events (of note, it has decreased frequency since admission)  - Increased pump speed from 5100 to 5200 given high PI index (to provide more LV support)  - Continuing oral vancomycin and holding his home hydralazine and lisinopril   - ID consulted, no further recs  - Lab holiday today  - Peaked a mild fever, UA negative but CXR with LLL mild infiltrate (no need for O2 requirement), has not been walking since admission, might be atelectasis. If worsening respiratory status, need O2 requirements, high fever or leukocytosis, panculture given concern for PNA  - Disposition plan: Once patient is symptom free  for at least 48hrs    # Clostridium difficile infection, mild  # Low PI events  # Non-anion gap metabolic acidosis  # Moderate dehydration  1-week of diarrheas x5/day. Has had low PI events over the past 24 hrs (~2.1-2.4) No mucus or bleeding. Denies fever, night sweats, or abdominal pain. Afebrile on admission. Physical exam with dry mucous membranes. Negative enteropathogen panel.  - IVF    > 1L NaCl 0.9% over 4h on 09/06   > 1.5 L LR over 4h twice on 09/07 (to ameliorate acidosis)   > 1.5 L LR over 4h on 09/08 and 09/09   > 1.5 L LR over 4h on 09/10   - Vancomycin 125mcg QID x10 days (09/08-P)  - ID consulted, no further recs    #HFrEF 2/2 ICM  in setting of cardiogenic shock (SCAI D) s/p HM3 LVAD  #RV failure s/p Protek duo temporary RVAD s/p decannulation 7/21  #RV thrombus  #Post chest closure hemopericardium s/p repeat washout (7/12)  #PMH CAD s/p PCI to LAD (2005)  #S/p CRT-D (2006)  Patient with ICM s/p HM3 LVAD 7/8/22 2/2 cardiogenic shock requiring MCS with IABP as prior to HM (initially evaluated for heart transplant, however noted to have substantially elevated DSAs during course of care, so decision made to proceed with LVAD given patient was already on temporary MCS). Intraoperative course during LVAD placement was c/b RVF resulting in cardiogenic shock requiring high dose pressors necessitating temporary RVAD support. Initial post-op course c/b hemopericardium requiring repeat washout with chest left open, with good recovery extubation and decannulation on 7/21. Patient tolerating hemodynamics and end organ standpoint well. He has had intermittent low flow alarms with high PI, no power spike, and a closed aortic valve on TTE. TTE also showed underfilling LV which could explain his low flow alarms d/t suckdown event so his LVAD speed was reduced to 5200. Patient did have a low flow alarm on 8/20 @ 1145 (PI 8-9 and not hypotensive); CTA chest done which showed that the LVAD outflow tract is widely  patent. Patient is euvolemic on exam, but at times has had orthostasis symptoms; patient encouraged to stay hydrated within his fluid/diet restriction guidelines (no more than 2L/day of fluid intake and no more than 3g of Na per day in dietary intake).     - Interrogation showed 2 low flow alarms in the past 24 hrs before epistaxis (has had at least 6 low flow alarms over the past 5 days, has hx of hemorrhoidal bleeding x1 and soft stools)              > continue with IV fluids   - Volume status: Euvolemic              > Diuretics: Not needed at this time  - Afterload reduction: MAP goal 65-80   >  Holding Hydralazine 37.5 TID    >  Holding Lisinopril 2.5 qday  - Statin: Atorvastatin 40mg  - BB: Holding in setting of recent cardiogenic shock and orthostatic symptoms, consider restarting outpatient  - AA: Holding in setting of recent cardiogenic shock and orthostatic symptoms, consider restarting outpatient  - SGLTi: As outpatient, has been held due to immunosuppression he is on for his SLE.  - Anticoagulation: Continue PTA Warfarin   - Antiplatelets: Holding PTA ASA 81mg qday given hx of epistaxis  -       #Epistaxis, improved  # Hx Intubated due to Concern for airway protection (8/7)-Extubated (8/8)  # Mild-moderate emphysema  # History of COVID-19  # Iatrogenic R apical PTX  H/o nasal perforation  For elective procedure (in [place]), postponed 2/2 LVAD insertion. Epistaxis not fixed by packing overnight on 8/6 and pt continued to bleed. Intubated for airway protection. Controlled at bedside by ENT s/p cautery and packing. Extubated 8/8. Had Cefepime/Vanc empirically for broad coverage for PNA (8/7-8/12). Patient removed his own nasal packing overnight on 8/9. Now admitted for profuse bleeding 1h after taking warfarin. ENT performed nasal package ~2h.  - started nasal saline today   - Follow with Dr Vazquez (EN) in 2 weeks   - Maintain nasal package       #GERD  #Hx of Hematemesis / oral mucosal bleeding    #Dysphagia  GI consulted during a prior admission 7/31 for black tarry stools and a possible GI bleed, however it is more likely swallowed blood from epistaxis that patient previously had. GI did not recommend an upper endoscopy. Recurrence of bloody stools likely due to epistaxis which have resolved. Hemoglobin was monitored and continued to be stable throughout the rest of the hospitalization. Cleared for regular diet by SLP.  - Na 2g diet      #Anemia due to blood loss from Epistaxis, stable  #History of DVT and PE   #Antiphospholipid antibody syndrome  #History of iron deficiency anemia  Hb stable 11.1  - Transfuse if Hb<7 or actively bleeding  - Trend Hb     #SLE  - Holding PTA meds: hydroxychloroquine 200mg (held before and resumed 7/16)    Diet:  Na 2g diet  DVT Prophylaxis: Warfarin   Fitzgerald Catheter: Not present  Code Status: Full Code  Fluids: Lactated Ringer 1.5L x2   Lines: PIV     Disposition Plan   Expected discharge: 2 - 3 days, recommended to prior living arrangement once fluid volume status optimized on oral medication.    Entered: Keily Bowles MD 09/07/2022, 2:17 PM     The patient's care was discussed with the Attending Physician, Dr. Lora.    Kristie Bowles  Internal Medicine PGY-2  Cards-2 Service  ________________________________________________  Chief Complaint   Loose stools    Interval Progress  Had a mild temperature overnight and has complained of mild upper abdominal pain. Continue with at least 2 diarrheas but overall physical exam negative for abdominal distention or peritoneal signs. Has less PI events of high PI and low flow. Given IVF 1.5L over 4h. ID consulted given persistent diarrheas but not further recs.     Review of Systems    The 10 point Review of Systems is negative other than noted in the HPI or here.   Prior to Admission Medications   Prior to Admission Medications   Prescriptions Last Dose Informant Patient Reported? Taking?   Melatonin 10 MG TABS  tablet   No No   Sig: Take 1 tablet (10 mg) by mouth At Bedtime for 30 days   QUEtiapine (SEROQUEL) 25 MG tablet   No No   Sig: Take 1 tablet (25 mg) by mouth 2 times daily for 30 days   QUEtiapine (SEROQUEL) 50 MG tablet   No No   Sig: Take 3 tablets (150 mg) by mouth At Bedtime for 30 days   acetaminophen (TYLENOL) 325 MG tablet   No No   Sig: Take 3 tablets (975 mg) by mouth every 8 hours for 30 days   atorvastatin (LIPITOR) 40 MG tablet   No No   Sig: Take 1 tablet (40 mg) by mouth every evening for 30 days   digoxin (LANOXIN) 125 MCG tablet   No No   Sig: Take 1 tablet (125 mcg) by mouth daily for 30 days   fluticasone-vilanterol (BREO ELLIPTA) 100-25 MCG/INH inhaler   No No   Sig: Inhale 1 puff into the lungs daily for 30 days   hydrALAZINE (APRESOLINE) 50 MG tablet   No No   Sig: Take 1 tablet (50 mg) by mouth 3 times daily for 30 days   hydrOXYzine (ATARAX) 25 MG tablet   No No   Sig: Take 1 tablet (25 mg) by mouth every 6 hours as needed for anxiety (ANXIETY)   hydrocortisone 1 % CREA cream   No No   Sig: Place rectally 3 times daily for 2 days   hydroxychloroquine (PLAQUENIL) 200 MG tablet   Yes No   Sig: Take 200 mg by mouth 2 times daily   lisinopril (ZESTRIL) 10 MG tablet   No No   Sig: Take 1 tablet (10 mg) by mouth daily for 30 days   melatonin 3 MG tablet   Yes No   Sig: Take 6 mg by mouth nightly as needed for sleep   multivitamin w/minerals (THERA-VIT-M) tablet   No No   Sig: Take 1 tablet by mouth daily   mycophenolate (GENERIC EQUIVALENT) 500 MG tablet   No No   Sig: Take 3 tablets (1,500 mg) by mouth 2 times daily for 30 days   pantoprazole (PROTONIX) 40 MG EC tablet   No No   Sig: Take 1 tablet (40 mg) by mouth daily   pramox-pe-glycerin-petrolatum (PREPARATION H) 1-0.25-14.4-15 % CREA cream   No No   Sig: Place rectally 2 times daily as needed for hemorrhoids Apply immediately after a bowel movement.   promethazine (PHENERGAN) 12.5 MG tablet   No No   Sig: Take 1 tablet (12.5 mg) by mouth  every 6 hours as needed for nausea or vomiting   sertraline (ZOLOFT) 100 MG tablet   No No   Sig: Take 1 tablet (100 mg) by mouth daily for 30 days   thiamine (B-1) 100 MG tablet   No No   Sig: Take 1 tablet (100 mg) by mouth daily   warfarin ANTICOAGULANT (COUMADIN) 2.5 MG tablet   No No   Sig: Take 2 tabs (5mg) daily at bedtime. Future dose adjustments pending INR   zolpidem (AMBIEN) 5 MG tablet   Yes No   Sig: Take 5 mg by mouth nightly as needed for sleep      Facility-Administered Medications: None     Allergies   No Known Allergies    Physical Exam   Vital Signs: Temp: 97.7  F (36.5  C) Temp src: Temporal BP: 100/84 Pulse: 92   Resp: 21 SpO2: 100 % O2 Device: None (Room air)    Weight: 130 lbs 0 oz    Constitutional: awake, alert, cooperative, no apparent distress, appears dry  Hematologic / Lymphatic: no cervical lymphadenopathy and no supraclavicular lymphadenopathy  Respiratory: No increased work of breathing, good air exchange, clear to auscultation bilaterally, no crackles or wheezing  Cardiovascular: LVAD hum   Abdomen: soft nt nd, no guarding   Musculoskeletal: Normal capillary refill, warm to touch extremities.   Neurologic: Awake, alert, oriented to name, place and time.  Cranial nerves II-XII are grossly intact.  No focal deficit.     Data   Data reviewed today: I reviewed all medications, new labs and imaging results over the last 24 hours. I personally reviewed Cardiology specific data review: the EKG tracing showing no ischemic ST/T changes. Sinus tachycardia     Recent Labs   Lab 09/07/22  0549 09/06/22  1333 09/05/22  0617 09/04/22  2333 09/04/22  2234 09/04/22  0601 09/01/22  0606   WBC 5.5 7.5 7.0  --  6.7  --   --    HGB 9.3* 10.4* 9.4*  --  11.1* 11.1*  --    MCV 93 93 98  --  92  --   --     308 235  --  307  --   --    INR  --  2.49*  --  2.64*  --  2.69*  --     138 135*  --  137  --   --    POTASSIUM 3.8 4.1 4.1  --  4.2  --    < >   CHLORIDE 108* 106 105  --  106  --    < >    CO2 18* 21* 18*  --  16*  --    < >   BUN 11.3 15.0 24.2*  --  17.6  --    < >   CR 0.57* 0.79 0.50*  --  0.59*  --   --    ANIONGAP 11 11 12  --  15  --    < >   ELDA 9.0 9.8 9.1  --  9.5  --   --    GLC 83 112* 97  --  112*  --    < >   ALBUMIN  --  4.1  --   --  3.8  --   --    PROTTOTAL  --  7.7  --   --  7.7  --   --    BILITOTAL  --  0.4  --   --  0.4  --   --    ALKPHOS  --  136*  --   --  133*  --   --    ALT  --  20  --   --  15  --   --    AST  --  30  --   --  42  --   --     < > = values in this interval not displayed.     Most Recent 3 CBC's:  Recent Labs   Lab Test 09/07/22  0549 09/06/22 1333 09/05/22  0617   WBC 5.5 7.5 7.0   HGB 9.3* 10.4* 9.4*   MCV 93 93 98    308 235     Most Recent 3 BMP's:  Recent Labs   Lab Test 09/07/22  0549 09/06/22 1333 09/05/22  0617    138 135*   POTASSIUM 3.8 4.1 4.1   CHLORIDE 108* 106 105   CO2 18* 21* 18*   BUN 11.3 15.0 24.2*   CR 0.57* 0.79 0.50*   ANIONGAP 11 11 12   ELDA 9.0 9.8 9.1   GLC 83 112* 97     Most Recent 2 LFT's:  Recent Labs   Lab Test 09/06/22  1333 09/04/22  2234   AST 30 42   ALT 20 15   ALKPHOS 136* 133*   BILITOTAL 0.4 0.4     Most Recent 3 INR's:  Recent Labs   Lab Test 09/06/22 1333 09/04/22  2333 09/04/22  0601   INR 2.49* 2.64* 2.69*     Anticoagulation Dose History     Recent Dosing and Labs Latest Ref Rng & Units 8/31/2022 9/1/2022 9/2/2022 9/3/2022 9/4/2022 9/4/2022 9/6/2022    Warfarin 3 mg - 6 mg - - - - - -    Warfarin 5 mg - - - 5 mg - - - -    Warfarin 7.5 mg - - 7.5 mg - 7.5 mg - - -    [id number] 70 - 130 % - - - - - - -    INR 0.85 - 1.15 2.53(H) 2.25(H) 2.56(H) 2.64(H) 2.69(H) 2.64(H) 2.49(H)          Most Recent 3 Troponin's:No lab results found.  Most Recent 3 BNP's:  Recent Labs   Lab Test 09/04/22  2234 06/17/22  1746 05/27/22  2215   NTBNPI 928* 4,611* 2,006*     Most Recent D-dimer:  Recent Labs   Lab Test 09/06/22  1333   DD 1.78*     Most Recent Cholesterol Panel:  Recent Labs   Lab Test 07/21/22  0331  22  0337 22  0334   CHOL  --   --  79   LDL  --   --  38   HDL  --   --  30*   TRIG 110   < > 53    < > = values in this interval not displayed.     Most Recent Hemoglobin A1c:  Recent Labs   Lab Test 22  0516   A1C 5.5     Most Recent 6 glucoses:  Recent Labs   Lab Test 22  0549 22  1333 22  0617 22  2234 22  0606 22  0804   GLC 83 112* 97 112* 94 104*     Most Recent Urinalysis:  Recent Labs   Lab Test 22  1542   COLOR Yellow   APPEARANCE Clear   URINEGLC Negative   URINEBILI Negative   URINEKETONE Negative   SG 1.014   UBLD Negative   URINEPH 5.5   PROTEIN 30 *   NITRITE Negative   LEUKEST Negative   RBCU <1   WBCU 4     Most Recent ESR & CRP:  Recent Labs   Lab Test 22  2234 22  0606 22  1522   SED  --   --  39*   CRP 13.50*   < > 28.0*    < > = values in this interval not displayed.     Most Recent Anemia Panel:  Recent Labs   Lab Test 22  0549 22  1711 22  1005   WBC 5.5   < >  --    HGB 9.3*   < >  --    HCT 29.8*   < >  --    MCV 93   < >  --       < >  --    IRON  --   --  42*   IRONSAT  --   --  16   FEB  --   --  260   SHAWN  --   --  365    < > = values in this interval not displayed.     Recent Results (from the past 24 hour(s))   Echocardiogram Complete   Result Value    LVEF  10-15% (severely reduced)    [facility]    [id number]  [id number]  [id number]  [id number]^ZELDA^ADRIÁN^MARCUS     [facility]  Echocardiography Laboratory  [address] [place] [id number]     Name: OLEG KAUR  MRN: [id number]  : 1960  Study Date: 2022 01:44 PM  Age: 61 yrs  Gender: Male  Patient Location: [facility]  Reason For Study: Chronic systolic congestive heart failure (H), LVAD (left  ventri  Ordering Physician: ADRIÁN NDIAYE  Referring Physician: ADRIÁN NDIAYE  Performed By: Chantal Malin     BSA: 1.7 m2  Height: 67 in  Weight: 130  lb  ______________________________________________________________________________  ______________________________________________________________________________  Interpretation Summary  HM3 at 5200 rpm.  At baseline -  Left ventricular function is decreased. The ejection fraction is 10-15%  (severely reduced). Septum is midline.  LVAD cannula was seen in the expected anatomic position in the LV apex.  Mild right ventricular dilation is present. Global right ventricular function  is mildly reduced.  The AV remains closed throughout the cardiac cycle. There is mild, continuous  aortic regurgitation.  The inferior vena cava was normal in size with preserved respiratory  variability.  No pericardial effusion is present.     Please refer to the EPIC report for measurements performed at different LVAD  speed settings.  ______________________________________________________________________________  Left Ventricle  Left ventricular function is decreased. The ejection fraction is 10-15%  (severely reduced). Please refer to the EPIC report for measurements performed  at different LVAD speed settings. LVAD cannula was seen in the expected  anatomic position in the LV apex. Septum is midline.     Right Ventricle  Mild right ventricular dilation is present. Global right ventricular function  is mildly reduced.     Aortic Valve  The AV remains closed throughout the cardiac cycle. There is mild, continuous  aortic regurgitation.     Tricuspid Valve  Mild tricuspid insufficiency is present.     Vessels  The inferior vena cava was normal in size with preserved respiratory  variability.     Pericardium  No pericardial effusion is present.  ______________________________________________________________________________  MMode/2D Measurements & Calculations  IVSd: 0.88 cm  LVIDd: 3.9 cm  LVIDs: 3.7 cm  LVPWd: 0.99 cm  FS: 3.1 %  LV mass(C)d: 109.2 grams  LV mass(C)dI: 64.9 grams/m2  RWT: 0.52      ______________________________________________________________________________  Report approved by: Nakia Gaines 09/06/2022 02:57 PM

## 2022-09-10 NOTE — PLAN OF CARE
OT: Defer. Per chart review and discussion with pt. Pt. recently d/c'd from ARU, is indep. with BADLs and basic transf.s no acute IP OT needs identified at this time. OT orders completed. Defer to PT for any IP gait/strength needs.

## 2022-09-10 NOTE — CONSULTS
General Infectious Disease Service Consultation - [facility]      Patient: Dandy Sands  : 1960  MRN: [id number]  Date of Admission: 2022  Date of Visit: 9/10/2022  Requesting Provider: Yoli Askew    Assessment and Recommendations:   Problem List:  1. CDI, initial episode, non-severe  2. HFrEF 2/2 ICM s/p LVAD  3. PE and ALPS on warfarin  4. SLE on HCQ   5. Staph epi and E fecalis PICC line colonization vs infection s/p PICC line removal and vancomycin x5 days (2022)    Discussion:  61-year-old male with PMH of HFrEF 2/2 ICM, PE and APLS on warfarin, and SLE admitted on 2022 for epistaxis and diarrhea.     He rested positive for C diff 2022 and started vancomycin 2022. He has been afebrile (aside from an isolated low grade fever of 100.2 F yesterday) and HDS. His creatinine and WBC are normal. He doesn't have abdominal pain. His doesn't have history of CDI or recent antibiotic exposure, however, his possible risk factors include prolonged hospitalization and PPI use.     He was appropriately started on vancomycin PO and will likely continue to improve over the coming days. He reports improved appetite and fewer episodes of diarrhea since starting treatment which will hopefully help with his fluid status.    Recommendations:  1. Continue vancomycin 125 mg PO QID x10 days     Recommendations discussed with Dr. Cee.    Thank you for this consult. The [facility] team will continue to follow along. Please feel free to call with any questions.     Shante Cunningham MD  Pager: [phone]    History of Present Illness:   61-year-old male with PMH of HFrEF 2/2 ICM, PE and APLS on warfarin, and SLE admitted on 2022 for epistaxis and diarrhea.     He had a prolonged hospitalization 2022-2022 for decompensated HF c/b cardiogenic shock s/p LVAD placement 2022 followed by a stay at [facility] 2022[facility]. He returned to the ED 2022 for epistaxis and diarrhea.      He reports developing diarrhea around 9/4/2022. He was having at least 3 liquid BMs daily. He denied fevers/chills or abdominal pain. He tested positive for C diff 9/7/2022 and was started on vancomycin PO 9/8/2022. He says his diarrhea has improved since. He had only 1 episode of diarrhea yesterday evening. He reports his appetite is also improving.     He has had ongoing low flow alarms and has been receiving fluid boluses.     Review of Systems:   Complete ROS obtained. Pertinent positives/negatives as noted in the HPI.     Past Medical History:     Past Medical History:   Diagnosis Date     Antiphospholipid antibody syndrome (H)      CAD (coronary artery disease)      Chronic systolic heart failure (H)      Ischemic cardiomyopathy      Mitral regurgitation      Systemic lupus erythematosus (H)        Allergies:   No Known Allergies    Recent Antimicrobials:   Vancomycin PO 9/8/2022-present    Family History:   No family history on file.    Social History:     Social History     Socioeconomic History     Marital status: Single     Spouse name: Not on file     Number of children: Not on file     Years of education: Not on file     Highest education level: Not on file   Occupational History     Not on file   Tobacco Use     Smoking status: Not on file     Smokeless tobacco: Not on file   Substance and Sexual Activity     Alcohol use: Not on file     Drug use: Not on file     Sexual activity: Not on file   Other Topics Concern     Not on file   Social History Narrative     Not on file     Social Determinants of Health     Financial Resource Strain: Not on file   Food Insecurity: Not on file   Transportation Needs: Not on file   Physical Activity: Not on file   Stress: Not on file   Social Connections: Not on file   Intimate Partner Violence: Not on file   Housing Stability: Not on file     Physical Exam:   BP 96/82 (BP Location: Left arm)   Pulse 86   Temp 97.5  F (36.4  C) (Oral)   Resp 18   Ht 1.702 m (5'  "7\")   Wt 60.5 kg (133 lb 6.1 oz)   SpO2 99%   BMI 20.89 kg/m       General: Lying in bed. Appears comfortable.   HEENT: AT/NC. Sclera and conjunctiva clear. Pupils equal. MMM.  Neck: Supple.   Heart: LVAD hum.  Lungs: Effort normal. CTAB. No wheezes or crackles.   Abdomen: S/NT/ND. LVAD driveline covered with clean sterile bandage.   Extremities: No peripheral edema.   Skin: No suspicious lesions on exposed areas.   Neurologic: Mentation intact. Speech normal. Moves all extremities spontaneously.   Psychiatric: Mood appropriate. Behavior normal.     Laboratory Data:     Lab Results   Component Value Date    CR 0.53 (L) 09/09/2022    LACT 1.0 09/06/2022    CRP 13.50 (H) 09/04/2022    WBC 5.6 09/09/2022     Recent Microbiology Data:   C diff toxin PCR 9/7 positive  Enteric stool panel 9/7 negative    Imaging:   N/A    "

## 2022-09-11 ENCOUNTER — APPOINTMENT (OUTPATIENT)
Dept: PHYSICAL THERAPY | Facility: CLINIC | Age: 62
DRG: 371 | End: 2022-09-11
Payer: COMMERCIAL

## 2022-09-11 LAB
ANION GAP SERPL CALCULATED.3IONS-SCNC: 8 MMOL/L (ref 7–15)
BUN SERPL-MCNC: 9.8 MG/DL (ref 8–23)
CALCIUM SERPL-MCNC: 9.2 MG/DL (ref 8.8–10.2)
CHLORIDE SERPL-SCNC: 105 MMOL/L (ref 98–107)
CREAT SERPL-MCNC: 0.57 MG/DL (ref 0.67–1.17)
DEPRECATED HCO3 PLAS-SCNC: 25 MMOL/L (ref 22–29)
ERYTHROCYTE [DISTWIDTH] IN BLOOD BY AUTOMATED COUNT: 17.3 % (ref 10–15)
GFR SERPL CREATININE-BSD FRML MDRD: >90 ML/MIN/1.73M2
GLUCOSE SERPL-MCNC: 98 MG/DL (ref 70–99)
HCT VFR BLD AUTO: 31.9 % (ref 40–53)
HGB BLD-MCNC: 9.6 G/DL (ref 13.3–17.7)
INR PPP: 2.2 (ref 0.85–1.15)
MAGNESIUM SERPL-MCNC: 1.5 MG/DL (ref 1.7–2.3)
MCH RBC QN AUTO: 28.8 PG (ref 26.5–33)
MCHC RBC AUTO-ENTMCNC: 30.1 G/DL (ref 31.5–36.5)
MCV RBC AUTO: 96 FL (ref 78–100)
PLATELET # BLD AUTO: 239 10E3/UL (ref 150–450)
POTASSIUM SERPL-SCNC: 4.4 MMOL/L (ref 3.4–5.3)
RBC # BLD AUTO: 3.33 10E6/UL (ref 4.4–5.9)
SODIUM SERPL-SCNC: 138 MMOL/L (ref 136–145)
WBC # BLD AUTO: 5.9 10E3/UL (ref 4–11)

## 2022-09-11 PROCEDURE — 87209 SMEAR COMPLEX STAIN: CPT

## 2022-09-11 PROCEDURE — 36415 COLL VENOUS BLD VENIPUNCTURE: CPT | Performed by: INTERNAL MEDICINE

## 2022-09-11 PROCEDURE — 250N000011 HC RX IP 250 OP 636: Performed by: INTERNAL MEDICINE

## 2022-09-11 PROCEDURE — 250N000013 HC RX MED GY IP 250 OP 250 PS 637

## 2022-09-11 PROCEDURE — 93750 INTERROGATION VAD IN PERSON: CPT | Performed by: INTERNAL MEDICINE

## 2022-09-11 PROCEDURE — 250N000012 HC RX MED GY IP 250 OP 636 PS 637

## 2022-09-11 PROCEDURE — 999N000127 HC STATISTIC PERIPHERAL IV START W US GUIDANCE

## 2022-09-11 PROCEDURE — 258N000003 HC RX IP 258 OP 636

## 2022-09-11 PROCEDURE — 99233 SBSQ HOSP IP/OBS HIGH 50: CPT | Mod: 25 | Performed by: INTERNAL MEDICINE

## 2022-09-11 PROCEDURE — 85610 PROTHROMBIN TIME: CPT | Performed by: INTERNAL MEDICINE

## 2022-09-11 PROCEDURE — 85027 COMPLETE CBC AUTOMATED: CPT

## 2022-09-11 PROCEDURE — 80048 BASIC METABOLIC PNL TOTAL CA: CPT

## 2022-09-11 PROCEDURE — 83735 ASSAY OF MAGNESIUM: CPT | Performed by: INTERNAL MEDICINE

## 2022-09-11 PROCEDURE — 250N000013 HC RX MED GY IP 250 OP 250 PS 637: Performed by: INTERNAL MEDICINE

## 2022-09-11 PROCEDURE — 36592 COLLECT BLOOD FROM PICC: CPT | Performed by: INTERNAL MEDICINE

## 2022-09-11 PROCEDURE — 36569 INSJ PICC 5 YR+ W/O IMAGING: CPT

## 2022-09-11 PROCEDURE — 120N000003 HC R&B IMCU UMMC

## 2022-09-11 PROCEDURE — 97530 THERAPEUTIC ACTIVITIES: CPT | Mod: GP

## 2022-09-11 RX ORDER — MAGNESIUM SULFATE HEPTAHYDRATE 40 MG/ML
4 INJECTION, SOLUTION INTRAVENOUS ONCE
Status: COMPLETED | OUTPATIENT
Start: 2022-09-11 | End: 2022-09-11

## 2022-09-11 RX ORDER — LISINOPRIL 5 MG/1
5 TABLET ORAL DAILY
Status: DISCONTINUED | OUTPATIENT
Start: 2022-09-11 | End: 2022-09-12

## 2022-09-11 RX ORDER — LIDOCAINE 40 MG/G
CREAM TOPICAL
Status: DISCONTINUED | OUTPATIENT
Start: 2022-09-11 | End: 2022-09-15 | Stop reason: HOSPADM

## 2022-09-11 RX ORDER — WARFARIN SODIUM 7.5 MG/1
7.5 TABLET ORAL
Status: COMPLETED | OUTPATIENT
Start: 2022-09-11 | End: 2022-09-11

## 2022-09-11 RX ADMIN — VANCOMYCIN HYDROCHLORIDE 125 MG: 125 CAPSULE ORAL at 09:13

## 2022-09-11 RX ADMIN — MAGNESIUM SULFATE HEPTAHYDRATE 4 G: 40 INJECTION, SOLUTION INTRAVENOUS at 20:05

## 2022-09-11 RX ADMIN — QUETIAPINE 25 MG: 25 TABLET ORAL at 14:04

## 2022-09-11 RX ADMIN — VANCOMYCIN HYDROCHLORIDE 125 MG: 125 CAPSULE ORAL at 14:04

## 2022-09-11 RX ADMIN — THIAMINE HCL TAB 100 MG 100 MG: 100 TAB at 09:12

## 2022-09-11 RX ADMIN — SERTRALINE HYDROCHLORIDE 100 MG: 100 TABLET ORAL at 09:13

## 2022-09-11 RX ADMIN — Medication 3 MG: at 21:17

## 2022-09-11 RX ADMIN — QUETIAPINE 25 MG: 25 TABLET ORAL at 09:13

## 2022-09-11 RX ADMIN — Medication 150 MG: at 21:17

## 2022-09-11 RX ADMIN — VANCOMYCIN HYDROCHLORIDE 125 MG: 125 CAPSULE ORAL at 21:17

## 2022-09-11 RX ADMIN — VANCOMYCIN HYDROCHLORIDE 125 MG: 125 CAPSULE ORAL at 17:46

## 2022-09-11 RX ADMIN — DIGOXIN 125 MCG: 125 TABLET ORAL at 09:13

## 2022-09-11 RX ADMIN — PANTOPRAZOLE SODIUM 40 MG: 40 TABLET, DELAYED RELEASE ORAL at 09:13

## 2022-09-11 RX ADMIN — MYCOPHENOLATE MOFETIL 1500 MG: 500 TABLET, FILM COATED ORAL at 20:05

## 2022-09-11 RX ADMIN — HYDROXYCHLOROQUINE SULFATE 200 MG: 200 TABLET, FILM COATED ORAL at 20:04

## 2022-09-11 RX ADMIN — SODIUM CHLORIDE, POTASSIUM CHLORIDE, SODIUM LACTATE AND CALCIUM CHLORIDE 1500 ML: 600; 310; 30; 20 INJECTION, SOLUTION INTRAVENOUS at 13:45

## 2022-09-11 RX ADMIN — HYDRALAZINE HYDROCHLORIDE 50 MG: 50 TABLET, FILM COATED ORAL at 13:55

## 2022-09-11 RX ADMIN — ATORVASTATIN CALCIUM 40 MG: 40 TABLET, FILM COATED ORAL at 20:05

## 2022-09-11 RX ADMIN — LISINOPRIL 5 MG: 5 TABLET ORAL at 10:39

## 2022-09-11 RX ADMIN — HYDRALAZINE HYDROCHLORIDE 50 MG: 50 TABLET, FILM COATED ORAL at 09:13

## 2022-09-11 RX ADMIN — WARFARIN SODIUM 7.5 MG: 7.5 TABLET ORAL at 17:46

## 2022-09-11 RX ADMIN — Medication 1 TABLET: at 09:12

## 2022-09-11 RX ADMIN — HYDROXYCHLOROQUINE SULFATE 200 MG: 200 TABLET, FILM COATED ORAL at 09:12

## 2022-09-11 RX ADMIN — HYDRALAZINE HYDROCHLORIDE 50 MG: 50 TABLET, FILM COATED ORAL at 20:05

## 2022-09-11 RX ADMIN — ACETAMINOPHEN 975 MG: 325 TABLET, FILM COATED ORAL at 17:46

## 2022-09-11 RX ADMIN — ACETAMINOPHEN 975 MG: 325 TABLET, FILM COATED ORAL at 09:12

## 2022-09-11 ASSESSMENT — ACTIVITIES OF DAILY LIVING (ADL)
ADLS_ACUITY_SCORE: 34

## 2022-09-11 NOTE — PROGRESS NOTES
Interrogation:  Inpatient      Type of VAD:  HM3      Current Parameters:  Flow 3.1, PI  6-7.2, speed 5100, flow 2.7       Abnormal Alarm on History:  Several low flow alarms in the setting of P events that resolved with IVF        Changes Made during Interrogation:  No changes made, IV hydration given with resolution         Yoli Askew MD

## 2022-09-11 NOTE — PLAN OF CARE
Neuro: A&Ox4.   Cardiac: Heartmate 3. Low flow alarm x1 (2.0) when standing at bedside, pt felt dizzy for ~10 seconds, recovered quickly, cards notified. SR w/ first degree AV block & BBB, doppler MAPs , taken on LLE, unable to auscultate on UEs. Afebrile.           Respiratory: Sating >95% on RA.  GI/: Adequate urine output. BMx1 this shift.   Diet/appetite: Tolerating 2G NA diet, 2L FR.   Activity:  SBA up to bedside commode.   Pain: At acceptable level on current regimen.   Skin: No new deficits noted.   LDA's: PIV. LVAD.      Plan: Continue with POC. Notify primary team with changes.

## 2022-09-11 NOTE — PROGRESS NOTES
[facility]  Cardiology History and Physical - Cardiology    Date of Admission:  9/6/2022    Assessment & Plan: MARISOL Dorman EDUARD Sands is a 61 year old male admitted on 9/5/2022. He is a 61M with pmhx of SLE, antiphospholipid syndrome, hx of history pulmonary embolism (on anticoagulation with warfarin), HFrEF due to ICM, HLD. He initially presented to [facility] on 6/13 due to nausea, vomiting, abdominal bloating and transferred to [facility] for admission on 6/17/2022 for decompensated heart failure 2/2 cardiogenic shock c/b multiorgan failure requiring IABP, is s/p HM 3 LVAD on 7/8/2022. Hospitalization was also c/b RV, had 29F Protek duo via RIJ, RVAD removed 7/21, hemopericardium requiring repeat washout, chest was closed 7/13. He then developed epistaxis on 8/6 ENT. Patient has h/o nasal perforation that required elective procedure (in [place]) which was postponed due to LVAD insertion.  He required intubation 8/7 for airway protection, s/p extubated 8/8. Nasal pack removed 8/9. Patient recently discharged on 09/05 for epistaxis after 1h after taking warfarin.  Now admitted with c diff diarrhea and low flow alarms.     Changes today  - Resuming PTA hydralazine and lisinopril given MAP   - Mid-line placed given PIV infiltrated   - Given 1.5L LR over 4 hrs this AM given still LVAD alarms (although less frequent)  - Continue with increased pump speed 5200 (upup from 5100)   - Continuing oral vancomycin   - Regular diet and remove fluid restriction   - Disposition plan: Once patient is symptom free for at least 48hrs (formed stools, better bowel continence), and no LVAD alarms    # Clostridium difficile infection, mild  # Low PI events  # Non-anion gap metabolic acidosis  # Moderate dehydration  1-week of diarrheas x5/day. Has had low PI events over the past 24 hrs (~2.1-2.4) No mucus or bleeding. Denies fever, night sweats, or abdominal pain.  Afebrile on admission. Physical exam with dry mucous membranes. Negative enteropathogen panel.  - IVF    > 1L NaCl 0.9% over 4h on 09/06   > 1.5 L LR over 4h twice on 09/07 (to ameliorate acidosis)   > 1.5 L LR over 4h on 09/08 and 09/09   > 1.5 L LR over 4h on 09/10    > 1.5 L LR over 4h on 09/11   - Vancomycin 125mcg QID x10 days (09/08-P)  - ID consulted, no further recs    #HFrEF 2/2 ICM  in setting of cardiogenic shock (SCAI D) s/p HM3 LVAD  #RV failure s/p Protek duo temporary RVAD s/p decannulation 7/21  #RV thrombus  #Post chest closure hemopericardium s/p repeat washout (7/12)  #PMH CAD s/p PCI to LAD (2005)  #S/p CRT-D (2006)  Patient with ICM s/p HM3 LVAD 7/8/22 2/2 cardiogenic shock requiring MCS with IABP as prior to HM (initially evaluated for heart transplant, however noted to have substantially elevated DSAs during course of care, so decision made to proceed with LVAD given patient was already on temporary MCS). Intraoperative course during LVAD placement was c/b RVF resulting in cardiogenic shock requiring high dose pressors necessitating temporary RVAD support. Initial post-op course c/b hemopericardium requiring repeat washout with chest left open, with good recovery extubation and decannulation on 7/21. Patient tolerating hemodynamics and end organ standpoint well. He has had intermittent low flow alarms with high PI, no power spike, and a closed aortic valve on TTE. TTE also showed underfilling LV which could explain his low flow alarms d/t suckdown event so his LVAD speed was reduced to 5200. Patient did have a low flow alarm on 8/20 @ 1145 (PI 8-9 and not hypotensive); CTA chest done which showed that the LVAD outflow tract is widely patent. Patient is euvolemic on exam, but at times has had orthostasis symptoms; patient encouraged to stay hydrated within his fluid/diet restriction guidelines (no more than 2L/day of fluid intake and no more than 3g of Na per day in dietary intake).     -  Interrogation showed 2 low flow alarms in the past 24 hrs before epistaxis (has had at least 6 low flow alarms over the past 5 days, has hx of hemorrhoidal bleeding x1 and soft stools)              > continue with IV fluids   - Volume status: Euvolemic              > Diuretics: Not needed at this time  - Afterload reduction: MAP goal 65-80   >  Resuming Hydralazine 37.5 TID    >  Resuming Lisinopril 2.5 qday  - Statin: Atorvastatin 40mg  - BB: Holding in setting of recent cardiogenic shock and orthostatic symptoms, consider restarting outpatient  - AA: Holding in setting of recent cardiogenic shock and orthostatic symptoms, consider restarting outpatient  - SGLTi: As outpatient, has been held due to immunosuppression he is on for his SLE.  - Anticoagulation: Continue PTA Warfarin   - Antiplatelets: Holding PTA ASA 81mg qday given hx of epistaxis  -       #Epistaxis, improved  # Hx Intubated due to Concern for airway protection (8/7)-Extubated (8/8)  # Mild-moderate emphysema  # History of COVID-19  # Iatrogenic R apical PTX  H/o nasal perforation  For elective procedure (in [place]), postponed 2/2 LVAD insertion. Epistaxis not fixed by packing overnight on 8/6 and pt continued to bleed. Intubated for airway protection. Controlled at bedside by ENT s/p cautery and packing. Extubated 8/8. Had Cefepime/Vanc empirically for broad coverage for PNA (8/7-8/12). Patient removed his own nasal packing overnight on 8/9. Now admitted for profuse bleeding 1h after taking warfarin. ENT performed nasal package ~2h.  - started nasal saline today   - Follow with Dr Vazquez (EN) in 2 weeks   - Maintain nasal package       #GERD  #Hx of Hematemesis / oral mucosal bleeding   #Dysphagia  GI consulted during a prior admission 7/31 for black tarry stools and a possible GI bleed, however it is more likely swallowed blood from epistaxis that patient previously had. GI did not recommend an upper endoscopy. Recurrence of bloody  stools likely due to epistaxis which have resolved. Hemoglobin was monitored and continued to be stable throughout the rest of the hospitalization. Cleared for regular diet by SLP.  - Regular diet   - Remove fluid restriction     #Anemia due to blood loss from Epistaxis, stable  #History of DVT and PE   #Antiphospholipid antibody syndrome  #History of iron deficiency anemia  Hb stable 11.1  - Transfuse if Hb<7 or actively bleeding  - Trend Hb     #SLE  - Holding PTA meds: hydroxychloroquine 200mg (held before and resumed 7/16)    Diet:  Na 2g diet  DVT Prophylaxis: Warfarin   Fitzgerald Catheter: Not present  Code Status: Full Code  Fluids: Lactated Ringer 1.5L x2   Lines: PIV     Disposition Plan   Expected discharge: 2 - 3 days, recommended to prior living arrangement once fluid volume status optimized on oral medication.    Entered: Keily Bowles MD 09/07/2022, 2:17 PM     The patient's care was discussed with the Attending Physician, Dr. Lora.    Kristie Bowles  Internal Medicine PGY-2  Cards-2 Service  ________________________________________________  Chief Complaint   Loose stools    Interval Progress  No acute events overnight. Hemodynamically stable. Patient still with some loose stools, at least x2 daily (with formed stools), less LVAD alarms after IVF and increased pump speed. PIV infiltrated this afternoon and has not received any IVF since this morning. Mid-line placed this evening for IVF.     Review of Systems    The 10 point Review of Systems is negative other than noted in the HPI or here.   Prior to Admission Medications   Prior to Admission Medications   Prescriptions Last Dose Informant Patient Reported? Taking?   Melatonin 10 MG TABS tablet   No No   Sig: Take 1 tablet (10 mg) by mouth At Bedtime for 30 days   QUEtiapine (SEROQUEL) 25 MG tablet   No No   Sig: Take 1 tablet (25 mg) by mouth 2 times daily for 30 days   QUEtiapine (SEROQUEL) 50 MG tablet   No No   Sig: Take 3 tablets  (150 mg) by mouth At Bedtime for 30 days   acetaminophen (TYLENOL) 325 MG tablet   No No   Sig: Take 3 tablets (975 mg) by mouth every 8 hours for 30 days   atorvastatin (LIPITOR) 40 MG tablet   No No   Sig: Take 1 tablet (40 mg) by mouth every evening for 30 days   digoxin (LANOXIN) 125 MCG tablet   No No   Sig: Take 1 tablet (125 mcg) by mouth daily for 30 days   fluticasone-vilanterol (BREO ELLIPTA) 100-25 MCG/INH inhaler   No No   Sig: Inhale 1 puff into the lungs daily for 30 days   hydrALAZINE (APRESOLINE) 50 MG tablet   No No   Sig: Take 1 tablet (50 mg) by mouth 3 times daily for 30 days   hydrOXYzine (ATARAX) 25 MG tablet   No No   Sig: Take 1 tablet (25 mg) by mouth every 6 hours as needed for anxiety (ANXIETY)   hydrocortisone 1 % CREA cream   No No   Sig: Place rectally 3 times daily for 2 days   hydroxychloroquine (PLAQUENIL) 200 MG tablet   Yes No   Sig: Take 200 mg by mouth 2 times daily   lisinopril (ZESTRIL) 10 MG tablet   No No   Sig: Take 1 tablet (10 mg) by mouth daily for 30 days   melatonin 3 MG tablet   Yes No   Sig: Take 6 mg by mouth nightly as needed for sleep   multivitamin w/minerals (THERA-VIT-M) tablet   No No   Sig: Take 1 tablet by mouth daily   mycophenolate (GENERIC EQUIVALENT) 500 MG tablet   No No   Sig: Take 3 tablets (1,500 mg) by mouth 2 times daily for 30 days   pantoprazole (PROTONIX) 40 MG EC tablet   No No   Sig: Take 1 tablet (40 mg) by mouth daily   pramox-pe-glycerin-petrolatum (PREPARATION H) 1-0.25-14.4-15 % CREA cream   No No   Sig: Place rectally 2 times daily as needed for hemorrhoids Apply immediately after a bowel movement.   promethazine (PHENERGAN) 12.5 MG tablet   No No   Sig: Take 1 tablet (12.5 mg) by mouth every 6 hours as needed for nausea or vomiting   sertraline (ZOLOFT) 100 MG tablet   No No   Sig: Take 1 tablet (100 mg) by mouth daily for 30 days   thiamine (B-1) 100 MG tablet   No No   Sig: Take 1 tablet (100 mg) by mouth daily   warfarin ANTICOAGULANT  (COUMADIN) 2.5 MG tablet   No No   Sig: Take 2 tabs (5mg) daily at bedtime. Future dose adjustments pending INR   zolpidem (AMBIEN) 5 MG tablet   Yes No   Sig: Take 5 mg by mouth nightly as needed for sleep      Facility-Administered Medications: None     Allergies   No Known Allergies    Physical Exam   Vital Signs: Temp: 97.7  F (36.5  C) Temp src: Temporal BP: 100/84 Pulse: 92   Resp: 21 SpO2: 100 % O2 Device: None (Room air)    Weight: 130 lbs 0 oz    Constitutional: awake, alert, cooperative, no acute distress, no d  Hematologic / Lymphatic: no cervical lymphadenopathy and no supraclavicular lymphadenopathy  Respiratory: No increased work of breathing, good air exchange, clear to auscultation bilaterally, no crackles or wheezing  Cardiovascular: LVAD hum   Abdomen: soft nt nd, no guarding   Musculoskeletal: Normal capillary refill, warm to touch extremities.   Neurologic: Awake, alert, oriented to name, place and time.  Cranial nerves II-XII are grossly intact.  No focal deficit.     Data   Data reviewed today: I reviewed all medications, new labs and imaging results over the last 24 hours. I personally reviewed Cardiology specific data review: the EKG tracing showing no ischemic ST/T changes. Sinus tachycardia     Recent Labs   Lab 09/07/22  0549 09/06/22  1333 09/05/22  0617 09/04/22  2333 09/04/22  2234 09/04/22  0601 09/01/22  0606   WBC 5.5 7.5 7.0  --  6.7  --   --    HGB 9.3* 10.4* 9.4*  --  11.1* 11.1*  --    MCV 93 93 98  --  92  --   --     308 235  --  307  --   --    INR  --  2.49*  --  2.64*  --  2.69*  --     138 135*  --  137  --   --    POTASSIUM 3.8 4.1 4.1  --  4.2  --    < >   CHLORIDE 108* 106 105  --  106  --    < >   CO2 18* 21* 18*  --  16*  --    < >   BUN 11.3 15.0 24.2*  --  17.6  --    < >   CR 0.57* 0.79 0.50*  --  0.59*  --   --    ANIONGAP 11 11 12  --  15  --    < >   ELDA 9.0 9.8 9.1  --  9.5  --   --    GLC 83 112* 97  --  112*  --    < >   ALBUMIN  --  4.1  --   --   3.8  --   --    PROTTOTAL  --  7.7  --   --  7.7  --   --    BILITOTAL  --  0.4  --   --  0.4  --   --    ALKPHOS  --  136*  --   --  133*  --   --    ALT  --  20  --   --  15  --   --    AST  --  30  --   --  42  --   --     < > = values in this interval not displayed.     Most Recent 3 CBC's:  Recent Labs   Lab Test 09/07/22  0549 09/06/22  1333 09/05/22  0617   WBC 5.5 7.5 7.0   HGB 9.3* 10.4* 9.4*   MCV 93 93 98    308 235     Most Recent 3 BMP's:  Recent Labs   Lab Test 09/07/22  0549 09/06/22  1333 09/05/22  0617    138 135*   POTASSIUM 3.8 4.1 4.1   CHLORIDE 108* 106 105   CO2 18* 21* 18*   BUN 11.3 15.0 24.2*   CR 0.57* 0.79 0.50*   ANIONGAP 11 11 12   ELDA 9.0 9.8 9.1   GLC 83 112* 97     Most Recent 2 LFT's:  Recent Labs   Lab Test 09/06/22  1333 09/04/22  2234   AST 30 42   ALT 20 15   ALKPHOS 136* 133*   BILITOTAL 0.4 0.4     Most Recent 3 INR's:  Recent Labs   Lab Test 09/06/22  1333 09/04/22  2333 09/04/22  0601   INR 2.49* 2.64* 2.69*     Anticoagulation Dose History     Recent Dosing and Labs Latest Ref Rng & Units 8/31/2022 9/1/2022 9/2/2022 9/3/2022 9/4/2022 9/4/2022 9/6/2022    Warfarin 3 mg - 6 mg - - - - - -    Warfarin 5 mg - - - 5 mg - - - -    Warfarin 7.5 mg - - 7.5 mg - 7.5 mg - - -    [id number] 70 - 130 % - - - - - - -    INR 0.85 - 1.15 2.53(H) 2.25(H) 2.56(H) 2.64(H) 2.69(H) 2.64(H) 2.49(H)          Most Recent 3 Troponin's:No lab results found.  Most Recent 3 BNP's:  Recent Labs   Lab Test 09/04/22  2234 06/17/22  1746 05/27/22  2215   NTBNPI 928* 4,611* 2,006*     Most Recent D-dimer:  Recent Labs   Lab Test 09/06/22  1333   DD 1.78*     Most Recent Cholesterol Panel:  Recent Labs   Lab Test 07/21/22  0331 07/11/22  0337 06/18/22  0334   CHOL  --   --  79   LDL  --   --  38   HDL  --   --  30*   TRIG 110   < > 53    < > = values in this interval not displayed.     Most Recent Hemoglobin A1c:  Recent Labs   Lab Test 05/20/22  0516   A1C 5.5     Most Recent 6  glucoses:  Recent Labs   Lab Test 22  0549 22  1333 22  0617 22  2234 22  0606 22  0804   GLC 83 112* 97 112* 94 104*     Most Recent Urinalysis:  Recent Labs   Lab Test 22  1542   COLOR Yellow   APPEARANCE Clear   URINEGLC Negative   URINEBILI Negative   URINEKETONE Negative   SG 1.014   UBLD Negative   URINEPH 5.5   PROTEIN 30 *   NITRITE Negative   LEUKEST Negative   RBCU <1   WBCU 4     Most Recent ESR & CRP:  Recent Labs   Lab Test 22  2234 22  0606 22  1522   SED  --   --  39*   CRP 13.50*   < > 28.0*    < > = values in this interval not displayed.     Most Recent Anemia Panel:  Recent Labs   Lab Test 22  0549 22  1711 22  1005   WBC 5.5   < >  --    HGB 9.3*   < >  --    HCT 29.8*   < >  --    MCV 93   < >  --       < >  --    IRON  --   --  42*   IRONSAT  --   --  16   FE  --   --  260   SHAWN  --   --  365    < > = values in this interval not displayed.     Recent Results (from the past 24 hour(s))   Echocardiogram Complete   Result Value    LVEF  10-15% (severely reduced)    [facility]    [id number]  [id number]  [id number]  [id number]^ZELDA^ADRIÁN^MARCUS     [facility],[address]  Echocardiography Laboratory  [address] [place] [id number]     Name: OLEG KAUR  MRN: [id number]  : 1960  Study Date: 2022 01:44 PM  Age: 61 yrs  Gender: Male  Patient Location: [facility]  Reason For Study: Chronic systolic congestive heart failure (H), LVAD (left  ventri  Ordering Physician: ADRIÁN NDIAYE  Referring Physician: ADRIÁN NDIAYE  Performed By: Chantal Malin     BSA: 1.7 m2  Height: 67 in  Weight: 130 lb  ______________________________________________________________________________  ______________________________________________________________________________  Interpretation Summary  HM3 at 5200 rpm.  At baseline -  Left ventricular function is decreased. The ejection fraction is  10-15%  (severely reduced). Septum is midline.  LVAD cannula was seen in the expected anatomic position in the LV apex.  Mild right ventricular dilation is present. Global right ventricular function  is mildly reduced.  The AV remains closed throughout the cardiac cycle. There is mild, continuous  aortic regurgitation.  The inferior vena cava was normal in size with preserved respiratory  variability.  No pericardial effusion is present.     Please refer to the EPIC report for measurements performed at different LVAD  speed settings.  ______________________________________________________________________________  Left Ventricle  Left ventricular function is decreased. The ejection fraction is 10-15%  (severely reduced). Please refer to the EPIC report for measurements performed  at different LVAD speed settings. LVAD cannula was seen in the expected  anatomic position in the LV apex. Septum is midline.     Right Ventricle  Mild right ventricular dilation is present. Global right ventricular function  is mildly reduced.     Aortic Valve  The AV remains closed throughout the cardiac cycle. There is mild, continuous  aortic regurgitation.     Tricuspid Valve  Mild tricuspid insufficiency is present.     Vessels  The inferior vena cava was normal in size with preserved respiratory  variability.     Pericardium  No pericardial effusion is present.  ______________________________________________________________________________  MMode/2D Measurements & Calculations  IVSd: 0.88 cm  LVIDd: 3.9 cm  LVIDs: 3.7 cm  LVPWd: 0.99 cm  FS: 3.1 %  LV mass(C)d: 109.2 grams  LV mass(C)dI: 64.9 grams/m2  RWT: 0.52     ______________________________________________________________________________  Report approved by: Nakia Gaines 09/06/2022 02:57 PM

## 2022-09-11 NOTE — PROCEDURES
[facility]    Double Lumen Midline Placement    Date/Time: 9/11/2022 4:55 PM  Performed by: LITA Norton  Authorized by: Pipo Hadley MD   Indications: vascular access      UNIVERSAL PROTOCOL   Site Marked: Yes  Prior Images Obtained and Reviewed:  Yes  Required items: Required blood products, implants, devices and special equipment available    Patient identity confirmed:  Verbally with patient  Patient was reevaluated immediately before administering moderate or deep sedation or anesthesia  Confirmation Checklist:  Patient's identity using two indicators, relevant allergies, procedure was appropriate and matched the consent or emergent situation and correct equipment/implants were available  Time out: Immediately prior to the procedure a time out was called    Universal Protocol: the Joint Commission Universal Protocol was followed    Preparation: Patient was prepped and draped in usual sterile fashion       ANESTHESIA    Local Anesthetic: Lidocaine 1% without epinephrine  Anesthetic Total (mL):  2      SEDATION    Patient Sedated: No        Preparation: skin prepped with ChloraPrep  Skin prep agent: skin prep agent completely dried prior to procedure  Sterile barriers: maximum sterile barriers were used: cap, mask, sterile gown, sterile gloves, and large sterile sheet  Hand hygiene: hand hygiene performed prior to central venous catheter insertion  Type of line used: Midline  Catheter type: double lumen  Catheter size: 5 Fr  Brand: Bard  Placement method: venipuncture, MST and ultrasound  Number of attempts: 1  Difficulty threading catheter: no  Successful placement: yes  Orientation: left    Location: basilic vein  Tip Location: distal to axillary vein  Arm circumference: adults 10 cm  Extremity circumference: 25  Visible catheter length: 0  Total catheter length: 20  Dressing and securement: dressing applied, chlorhexidine disc applied, line  secured, statlock, sterile dressing applied and transparent dressing  Post procedure assessment: blood return through all ports  PROCEDURE   Patient Tolerance:  Patient tolerated the procedure well with no immediate complicationsDescribe Procedure: Mid line ok to use

## 2022-09-11 NOTE — PROGRESS NOTES
Interrogation:  Inpatient      Type of VAD:  HM3      Current Parameters:  Flow 2.7, PI  7.5, speed 5100, flow 3.5       Abnormal Alarm on History:  Several low flow alarms in the setting of PI events     PIs are not at baseline however he has low BPS and dehydration - expect this is due to diarrhea and not pump malfunction         Changes Made during Interrogation:  No changes made         Yoli Askew MD

## 2022-09-12 LAB
ANION GAP SERPL CALCULATED.3IONS-SCNC: 7 MMOL/L (ref 7–15)
BASOPHILS # BLD AUTO: 0 10E3/UL (ref 0–0.2)
BASOPHILS NFR BLD AUTO: 1 %
BUN SERPL-MCNC: 9.3 MG/DL (ref 8–23)
CALCIUM SERPL-MCNC: 9.1 MG/DL (ref 8.8–10.2)
CHLORIDE SERPL-SCNC: 106 MMOL/L (ref 98–107)
CREAT SERPL-MCNC: 0.61 MG/DL (ref 0.67–1.17)
DEPRECATED HCO3 PLAS-SCNC: 28 MMOL/L (ref 22–29)
EOSINOPHIL # BLD AUTO: 0.1 10E3/UL (ref 0–0.7)
EOSINOPHIL NFR BLD AUTO: 2 %
ERYTHROCYTE [DISTWIDTH] IN BLOOD BY AUTOMATED COUNT: 17.4 % (ref 10–15)
GFR SERPL CREATININE-BSD FRML MDRD: >90 ML/MIN/1.73M2
GLUCOSE SERPL-MCNC: 108 MG/DL (ref 70–99)
HCT VFR BLD AUTO: 30.4 % (ref 40–53)
HGB BLD-MCNC: 9.4 G/DL (ref 13.3–17.7)
IMM GRANULOCYTES # BLD: 0 10E3/UL
IMM GRANULOCYTES NFR BLD: 0 %
INR PPP: 2.18 (ref 0.85–1.15)
LYMPHOCYTES # BLD AUTO: 0.8 10E3/UL (ref 0.8–5.3)
LYMPHOCYTES NFR BLD AUTO: 16 %
MAGNESIUM SERPL-MCNC: 2.1 MG/DL (ref 1.7–2.3)
MAGNESIUM SERPL-MCNC: 2.5 MG/DL (ref 1.7–2.3)
MCH RBC QN AUTO: 29.2 PG (ref 26.5–33)
MCHC RBC AUTO-ENTMCNC: 30.9 G/DL (ref 31.5–36.5)
MCV RBC AUTO: 94 FL (ref 78–100)
MONOCYTES # BLD AUTO: 0.4 10E3/UL (ref 0–1.3)
MONOCYTES NFR BLD AUTO: 8 %
NEUTROPHILS # BLD AUTO: 3.7 10E3/UL (ref 1.6–8.3)
NEUTROPHILS NFR BLD AUTO: 73 %
NRBC # BLD AUTO: 0 10E3/UL
NRBC BLD AUTO-RTO: 0 /100
O+P STL MICRO: NEGATIVE
PLATELET # BLD AUTO: 236 10E3/UL (ref 150–450)
POTASSIUM SERPL-SCNC: 4 MMOL/L (ref 3.4–5.3)
RBC # BLD AUTO: 3.22 10E6/UL (ref 4.4–5.9)
SODIUM SERPL-SCNC: 141 MMOL/L (ref 136–145)
WBC # BLD AUTO: 5.1 10E3/UL (ref 4–11)

## 2022-09-12 PROCEDURE — 80048 BASIC METABOLIC PNL TOTAL CA: CPT | Performed by: INTERNAL MEDICINE

## 2022-09-12 PROCEDURE — 85610 PROTHROMBIN TIME: CPT | Performed by: INTERNAL MEDICINE

## 2022-09-12 PROCEDURE — 93750 INTERROGATION VAD IN PERSON: CPT | Performed by: INTERNAL MEDICINE

## 2022-09-12 PROCEDURE — 250N000013 HC RX MED GY IP 250 OP 250 PS 637

## 2022-09-12 PROCEDURE — 36592 COLLECT BLOOD FROM PICC: CPT | Performed by: INTERNAL MEDICINE

## 2022-09-12 PROCEDURE — 120N000003 HC R&B IMCU UMMC

## 2022-09-12 PROCEDURE — 85025 COMPLETE CBC W/AUTO DIFF WBC: CPT | Performed by: INTERNAL MEDICINE

## 2022-09-12 PROCEDURE — 250N000012 HC RX MED GY IP 250 OP 636 PS 637

## 2022-09-12 PROCEDURE — 250N000013 HC RX MED GY IP 250 OP 250 PS 637: Performed by: INTERNAL MEDICINE

## 2022-09-12 PROCEDURE — 99233 SBSQ HOSP IP/OBS HIGH 50: CPT | Mod: 25 | Performed by: INTERNAL MEDICINE

## 2022-09-12 PROCEDURE — 83735 ASSAY OF MAGNESIUM: CPT | Performed by: INTERNAL MEDICINE

## 2022-09-12 PROCEDURE — 258N000003 HC RX IP 258 OP 636: Performed by: INTERNAL MEDICINE

## 2022-09-12 PROCEDURE — 999N000044 HC STATISTIC CVC DRESSING CHANGE

## 2022-09-12 RX ORDER — LISINOPRIL 5 MG/1
5 TABLET ORAL ONCE
Status: COMPLETED | OUTPATIENT
Start: 2022-09-12 | End: 2022-09-12

## 2022-09-12 RX ORDER — WARFARIN SODIUM 5 MG/1
5 TABLET ORAL
Status: COMPLETED | OUTPATIENT
Start: 2022-09-12 | End: 2022-09-12

## 2022-09-12 RX ORDER — LISINOPRIL 5 MG/1
10 TABLET ORAL DAILY
Status: DISCONTINUED | OUTPATIENT
Start: 2022-09-13 | End: 2022-09-15 | Stop reason: HOSPADM

## 2022-09-12 RX ADMIN — SALINE NASAL SPRAY 1 SPRAY: 1.5 SOLUTION NASAL at 09:26

## 2022-09-12 RX ADMIN — HYDROXYCHLOROQUINE SULFATE 200 MG: 200 TABLET, FILM COATED ORAL at 09:11

## 2022-09-12 RX ADMIN — QUETIAPINE 25 MG: 25 TABLET ORAL at 14:38

## 2022-09-12 RX ADMIN — THIAMINE HCL TAB 100 MG 100 MG: 100 TAB at 09:11

## 2022-09-12 RX ADMIN — SALINE NASAL SPRAY 1 SPRAY: 1.5 SOLUTION NASAL at 16:33

## 2022-09-12 RX ADMIN — MYCOPHENOLATE MOFETIL 1500 MG: 500 TABLET, FILM COATED ORAL at 09:10

## 2022-09-12 RX ADMIN — HYDRALAZINE HYDROCHLORIDE 50 MG: 50 TABLET, FILM COATED ORAL at 14:38

## 2022-09-12 RX ADMIN — Medication 3 MG: at 21:04

## 2022-09-12 RX ADMIN — ACETAMINOPHEN 975 MG: 325 TABLET, FILM COATED ORAL at 12:29

## 2022-09-12 RX ADMIN — HYDRALAZINE HYDROCHLORIDE 50 MG: 50 TABLET, FILM COATED ORAL at 21:04

## 2022-09-12 RX ADMIN — HYDRALAZINE HYDROCHLORIDE 50 MG: 50 TABLET, FILM COATED ORAL at 09:12

## 2022-09-12 RX ADMIN — MYCOPHENOLATE MOFETIL 1500 MG: 500 TABLET, FILM COATED ORAL at 21:04

## 2022-09-12 RX ADMIN — VANCOMYCIN HYDROCHLORIDE 125 MG: 125 CAPSULE ORAL at 21:04

## 2022-09-12 RX ADMIN — ATORVASTATIN CALCIUM 40 MG: 40 TABLET, FILM COATED ORAL at 21:03

## 2022-09-12 RX ADMIN — LISINOPRIL 5 MG: 5 TABLET ORAL at 09:11

## 2022-09-12 RX ADMIN — QUETIAPINE 25 MG: 25 TABLET ORAL at 09:12

## 2022-09-12 RX ADMIN — VANCOMYCIN HYDROCHLORIDE 125 MG: 125 CAPSULE ORAL at 16:13

## 2022-09-12 RX ADMIN — WARFARIN SODIUM 5 MG: 5 TABLET ORAL at 18:16

## 2022-09-12 RX ADMIN — DIGOXIN 125 MCG: 125 TABLET ORAL at 09:11

## 2022-09-12 RX ADMIN — HYDROXYCHLOROQUINE SULFATE 200 MG: 200 TABLET, FILM COATED ORAL at 21:04

## 2022-09-12 RX ADMIN — Medication 1 TABLET: at 09:12

## 2022-09-12 RX ADMIN — LISINOPRIL 5 MG: 5 TABLET ORAL at 16:22

## 2022-09-12 RX ADMIN — VANCOMYCIN HYDROCHLORIDE 125 MG: 125 CAPSULE ORAL at 12:29

## 2022-09-12 RX ADMIN — PANTOPRAZOLE SODIUM 40 MG: 40 TABLET, DELAYED RELEASE ORAL at 09:12

## 2022-09-12 RX ADMIN — SERTRALINE HYDROCHLORIDE 100 MG: 100 TABLET ORAL at 09:11

## 2022-09-12 RX ADMIN — VANCOMYCIN HYDROCHLORIDE 125 MG: 125 CAPSULE ORAL at 09:12

## 2022-09-12 RX ADMIN — SODIUM CHLORIDE, POTASSIUM CHLORIDE, SODIUM LACTATE AND CALCIUM CHLORIDE 1000 ML: 600; 310; 30; 20 INJECTION, SOLUTION INTRAVENOUS at 10:53

## 2022-09-12 RX ADMIN — Medication 150 MG: at 21:04

## 2022-09-12 RX ADMIN — ACETAMINOPHEN 975 MG: 325 TABLET, FILM COATED ORAL at 04:06

## 2022-09-12 ASSESSMENT — ACTIVITIES OF DAILY LIVING (ADL)
ADLS_ACUITY_SCORE: 30
ADLS_ACUITY_SCORE: 34
ADLS_ACUITY_SCORE: 30
ADLS_ACUITY_SCORE: 30
ADLS_ACUITY_SCORE: 34

## 2022-09-12 NOTE — PLAN OF CARE
Neuro: A&Ox4. Afebrile.  Cardiac: Heartmate 3. SR w/first degree AV block & BBB, doppler MAPs 80-90 from RUE. No alarms. Dressing changed.          Respiratory: Sats >92% on RA. Lungs diminished throughout.  GI/: Adequate UOP using urinalt. BMx2, stool sample sent.  Diet/appetite: Regular diet   Activity: SBA up to bedside commode, ambulated halls with PT.   Pain: At acceptable level on current regimen. Scheduled Tylenol given for chronic back pain.  Skin/LDAs: No new deficits noted. RUE infiltrate with LR, warm packed, no other signs of irritation.   -LUE Midline x2 lumen: LR bolus 1,500 mL (over 4hrs) infusing during change of shift.      *Mag replacement ordered, to be given after bolus finished.    Plan: Continue with POC. Notify primary team with changes.

## 2022-09-12 NOTE — PLAN OF CARE
Neuro: A&Ox4.   Cardiac: Heartmate 3. Low flow alarm x1 while standing at bedside for weight, flow dropped to 1.9, quickly recovered to 3.6 upon pt laying down, cards notified. SR w/ first degree AV block & BBB. Doppler MAPs 70-90s. Afebrile.           Respiratory: Sating >95% on RA.  GI/: Adequate urine output. No BM this shift.   Diet/appetite: Tolerating regular diet.   Activity:  SBA up to bedside commode.   Pain: At acceptable level on current regimen.   Skin: No new deficits noted.   LDA's: PIV. LVAD.      Plan: Continue with POC. Notify primary team with changes.

## 2022-09-12 NOTE — PROGRESS NOTES
[facility]  Cardiology History and Physical - Cardiology    Date of Admission:  9/6/2022    Assessment & Plan: MARISOL Dorman EDUARD Sands is a 61 year old male admitted on 9/5/2022. He is a 61M with pmhx of SLE, antiphospholipid syndrome, hx of history pulmonary embolism (on anticoagulation with warfarin), HFrEF due to ICM, HLD. He initially presented to [facility] on 6/13 due to nausea, vomiting, abdominal bloating and transferred to [facility] for admission on 6/17/2022 for decompensated heart failure 2/2 cardiogenic shock c/b multiorgan failure requiring IABP, is s/p HM 3 LVAD on 7/8/2022. Hospitalization was also c/b RV, had 29F Protek duo via RIJ, RVAD removed 7/21, hemopericardium requiring repeat washout, chest was closed 7/13. He then developed epistaxis on 8/6 ENT. Patient has h/o nasal perforation that required elective procedure (in [place]) which was postponed due to LVAD insertion.  He required intubation 8/7 for airway protection, s/p extubated 8/8. Nasal pack removed 8/9. Patient recently discharged on 09/05 for epistaxis after 1h after taking warfarin.  Now admitted with c diff diarrhea and low flow alarms.     Changes today  - giving another 1L LR   - work with therapy today     # Clostridium difficile infection, mild  # Low PI events  # Non-anion gap metabolic acidosis  # Moderate dehydration  1-week of diarrheas x5/day. Has had low PI events over the past 24 hrs (~2.1-2.4) No mucus or bleeding. Denies fever, night sweats, or abdominal pain. Afebrile on admission. Physical exam with dry mucous membranes. Negative enteropathogen panel.  - IVF    > 1L NaCl 0.9% over 4h on 09/06   > 1.5 L LR over 4h twice on 09/07 (to ameliorate acidosis)   > 1.5 L LR over 4h on 09/08 and 09/09   > 1.5 L LR over 4h on 09/10    > 1.5 L LR over 4h on 09/11   - Vancomycin 125mcg QID x10 days (09/08-P)  - ID consulted, no further recs    #HFrEF 2/2 ICM  in setting  of cardiogenic shock (SCAI D) s/p HM3 LVAD  #RV failure s/p Protek duo temporary RVAD s/p decannulation 7/21  #RV thrombus  #Post chest closure hemopericardium s/p repeat washout (7/12)  #PMH CAD s/p PCI to LAD (2005)  #S/p CRT-D (2006)  Patient with ICM s/p HM3 LVAD 7/8/22 2/2 cardiogenic shock requiring MCS with IABP as prior to HM (initially evaluated for heart transplant, however noted to have substantially elevated DSAs during course of care, so decision made to proceed with LVAD given patient was already on temporary MCS). Intraoperative course during LVAD placement was c/b RVF resulting in cardiogenic shock requiring high dose pressors necessitating temporary RVAD support. Initial post-op course c/b hemopericardium requiring repeat washout with chest left open, with good recovery extubation and decannulation on 7/21. Patient tolerating hemodynamics and end organ standpoint well. He has had intermittent low flow alarms with high PI, no power spike, and a closed aortic valve on TTE. TTE also showed underfilling LV which could explain his low flow alarms d/t suckdown event so his LVAD speed was reduced to 5200. Patient did have a low flow alarm on 8/20 @ 1145 (PI 8-9 and not hypotensive); CTA chest done which showed that the LVAD outflow tract is widely patent. Patient is euvolemic on exam, but at times has had orthostasis symptoms; patient encouraged to stay hydrated within his fluid/diet restriction guidelines (no more than 2L/day of fluid intake and no more than 3g of Na per day in dietary intake).     - Interrogation showed 2 low flow alarms in the past 24 hrs before epistaxis (has had at least 6 low flow alarms over the past 5 days, has hx of hemorrhoidal bleeding x1 and soft stools)              > continue with IV fluids   - Volume status: Euvolemic              > Diuretics: Not needed at this time  - Afterload reduction: MAP goal 65-80   >  Resuming Hydralazine 37.5 TID    >  Resuming Lisinopril 2.5  qday  - Statin: Atorvastatin 40mg  - BB: Holding in setting of recent cardiogenic shock and orthostatic symptoms, consider restarting outpatient  - AA: Holding in setting of recent cardiogenic shock and orthostatic symptoms, consider restarting outpatient  - SGLTi: As outpatient, has been held due to immunosuppression he is on for his SLE.  - Anticoagulation: Continue PTA Warfarin   - Antiplatelets: Holding PTA ASA 81mg qday given hx of epistaxis  -       #Epistaxis, improved  # Hx Intubated due to Concern for airway protection (8/7)-Extubated (8/8)  # Mild-moderate emphysema  # History of COVID-19  # Iatrogenic R apical PTX  H/o nasal perforation  For elective procedure (in [place]), postponed 2/2 LVAD insertion. Epistaxis not fixed by packing overnight on 8/6 and pt continued to bleed. Intubated for airway protection. Controlled at bedside by ENT s/p cautery and packing. Extubated 8/8. Had Cefepime/Vanc empirically for broad coverage for PNA (8/7-8/12). Patient removed his own nasal packing overnight on 8/9. Now admitted for profuse bleeding 1h after taking warfarin. ENT performed nasal package ~2h.  - started nasal saline   - Follow with Dr Vazquez (EN) in 2 weeks   - Maintain nasal package       #GERD  #Hx of Hematemesis / oral mucosal bleeding   #Dysphagia  GI consulted during a prior admission 7/31 for black tarry stools and a possible GI bleed, however it is more likely swallowed blood from epistaxis that patient previously had. GI did not recommend an upper endoscopy. Recurrence of bloody stools likely due to epistaxis which have resolved. Hemoglobin was monitored and continued to be stable throughout the rest of the hospitalization. Cleared for regular diet by SLP.  - Regular diet   - Remove fluid restriction     #Anemia due to blood loss from Epistaxis, stable  #History of DVT and PE   #Antiphospholipid antibody syndrome  #History of iron deficiency anemia  Hb stable 11.1  - Transfuse if Hb<7 or  actively bleeding  - Trend Hb     #SLE  - continue home hydroxychloroquine and MMF     Diet:  Na 2g diet  DVT Prophylaxis: Warfarin   Fitzgerald Catheter: Not present  Code Status: Full Code  Lines: PIV     Disposition Plan   Expected discharge: 1-2 days, recommended to prior living arrangement once fluid volume status optimized on oral medication.       The patient's care was discussed with the Attending Physician, Dr. Lora.    Ceferino Thomas MD  Cardiology Fellow  Pager: [phone]    ________________________________________________  Chief Complaint   Loose stools    Interval Progress  -doing well, one bowel movement, one low flow alarm when standing up for weight this am     Review of Systems    The 10 point Review of Systems is negative other than noted in the HPI or here.   Prior to Admission Medications   Prior to Admission Medications   Prescriptions Last Dose Informant Patient Reported? Taking?   Melatonin 10 MG TABS tablet   No No   Sig: Take 1 tablet (10 mg) by mouth At Bedtime for 30 days   QUEtiapine (SEROQUEL) 25 MG tablet   No No   Sig: Take 1 tablet (25 mg) by mouth 2 times daily for 30 days   QUEtiapine (SEROQUEL) 50 MG tablet   No No   Sig: Take 3 tablets (150 mg) by mouth At Bedtime for 30 days   acetaminophen (TYLENOL) 325 MG tablet   No No   Sig: Take 3 tablets (975 mg) by mouth every 8 hours for 30 days   atorvastatin (LIPITOR) 40 MG tablet   No No   Sig: Take 1 tablet (40 mg) by mouth every evening for 30 days   digoxin (LANOXIN) 125 MCG tablet   No No   Sig: Take 1 tablet (125 mcg) by mouth daily for 30 days   fluticasone-vilanterol (BREO ELLIPTA) 100-25 MCG/INH inhaler   No No   Sig: Inhale 1 puff into the lungs daily for 30 days   hydrALAZINE (APRESOLINE) 50 MG tablet   No No   Sig: Take 1 tablet (50 mg) by mouth 3 times daily for 30 days   hydrOXYzine (ATARAX) 25 MG tablet   No No   Sig: Take 1 tablet (25 mg) by mouth every 6 hours as needed for anxiety (ANXIETY)   hydrocortisone 1 % CREA  cream   No No   Sig: Place rectally 3 times daily for 2 days   hydroxychloroquine (PLAQUENIL) 200 MG tablet   Yes No   Sig: Take 200 mg by mouth 2 times daily   lisinopril (ZESTRIL) 10 MG tablet   No No   Sig: Take 1 tablet (10 mg) by mouth daily for 30 days   melatonin 3 MG tablet   Yes No   Sig: Take 6 mg by mouth nightly as needed for sleep   multivitamin w/minerals (THERA-VIT-M) tablet   No No   Sig: Take 1 tablet by mouth daily   mycophenolate (GENERIC EQUIVALENT) 500 MG tablet   No No   Sig: Take 3 tablets (1,500 mg) by mouth 2 times daily for 30 days   pantoprazole (PROTONIX) 40 MG EC tablet   No No   Sig: Take 1 tablet (40 mg) by mouth daily   pramox-pe-glycerin-petrolatum (PREPARATION H) 1-0.25-14.4-15 % CREA cream   No No   Sig: Place rectally 2 times daily as needed for hemorrhoids Apply immediately after a bowel movement.   promethazine (PHENERGAN) 12.5 MG tablet   No No   Sig: Take 1 tablet (12.5 mg) by mouth every 6 hours as needed for nausea or vomiting   sertraline (ZOLOFT) 100 MG tablet   No No   Sig: Take 1 tablet (100 mg) by mouth daily for 30 days   thiamine (B-1) 100 MG tablet   No No   Sig: Take 1 tablet (100 mg) by mouth daily   warfarin ANTICOAGULANT (COUMADIN) 2.5 MG tablet   No No   Sig: Take 2 tabs (5mg) daily at bedtime. Future dose adjustments pending INR   zolpidem (AMBIEN) 5 MG tablet   Yes No   Sig: Take 5 mg by mouth nightly as needed for sleep      Facility-Administered Medications: None     Allergies   No Known Allergies    Physical Exam   Vital Signs: Temp: 97.7  F (36.5  C) Temp src: Temporal BP: 100/84 Pulse: 92   Resp: 21 SpO2: 100 % O2 Device: None (Room air)    Weight: 130 lbs 0 oz    Constitutional: awake, alert, cooperative, no acute distress, no d  Hematologic / Lymphatic: no cervical lymphadenopathy and no supraclavicular lymphadenopathy  Respiratory: No increased work of breathing, good air exchange, clear to auscultation bilaterally, no crackles or  wheezing  Cardiovascular: LVAD hum   Abdomen: soft nt nd, no guarding   Musculoskeletal: Normal capillary refill, warm to touch extremities.   Neurologic: Awake, alert, oriented to name, place and time.  Cranial nerves II-XII are grossly intact.  No focal deficit.     Data   Data reviewed today: I reviewed all medications, new labs and imaging results over the last 24 hours. I personally reviewed Cardiology specific data review: the EKG tracing showing no ischemic ST/T changes. Sinus tachycardia     Recent Labs   Lab 09/07/22  0549 09/06/22  1333 09/05/22  0617 09/04/22  2333 09/04/22  2234 09/04/22  0601 09/01/22  0606   WBC 5.5 7.5 7.0  --  6.7  --   --    HGB 9.3* 10.4* 9.4*  --  11.1* 11.1*  --    MCV 93 93 98  --  92  --   --     308 235  --  307  --   --    INR  --  2.49*  --  2.64*  --  2.69*  --     138 135*  --  137  --   --    POTASSIUM 3.8 4.1 4.1  --  4.2  --    < >   CHLORIDE 108* 106 105  --  106  --    < >   CO2 18* 21* 18*  --  16*  --    < >   BUN 11.3 15.0 24.2*  --  17.6  --    < >   CR 0.57* 0.79 0.50*  --  0.59*  --   --    ANIONGAP 11 11 12  --  15  --    < >   ELDA 9.0 9.8 9.1  --  9.5  --   --    GLC 83 112* 97  --  112*  --    < >   ALBUMIN  --  4.1  --   --  3.8  --   --    PROTTOTAL  --  7.7  --   --  7.7  --   --    BILITOTAL  --  0.4  --   --  0.4  --   --    ALKPHOS  --  136*  --   --  133*  --   --    ALT  --  20  --   --  15  --   --    AST  --  30  --   --  42  --   --     < > = values in this interval not displayed.     Most Recent 3 CBC's:  Recent Labs   Lab Test 09/07/22  0549 09/06/22  1333 09/05/22  0617   WBC 5.5 7.5 7.0   HGB 9.3* 10.4* 9.4*   MCV 93 93 98    308 235     Most Recent 3 BMP's:  Recent Labs   Lab Test 09/07/22  0549 09/06/22  1333 09/05/22  0617    138 135*   POTASSIUM 3.8 4.1 4.1   CHLORIDE 108* 106 105   CO2 18* 21* 18*   BUN 11.3 15.0 24.2*   CR 0.57* 0.79 0.50*   ANIONGAP 11 11 12   ELDA 9.0 9.8 9.1   GLC 83 112* 97     Most Recent 2  LFT's:  Recent Labs   Lab Test 09/06/22  1333 09/04/22  2234   AST 30 42   ALT 20 15   ALKPHOS 136* 133*   BILITOTAL 0.4 0.4     Most Recent 3 INR's:  Recent Labs   Lab Test 09/06/22  1333 09/04/22  2333 09/04/22  0601   INR 2.49* 2.64* 2.69*     Anticoagulation Dose History     Recent Dosing and Labs Latest Ref Rng & Units 8/31/2022 9/1/2022 9/2/2022 9/3/2022 9/4/2022 9/4/2022 9/6/2022    Warfarin 3 mg - 6 mg - - - - - -    Warfarin 5 mg - - - 5 mg - - - -    Warfarin 7.5 mg - - 7.5 mg - 7.5 mg - - -    [id number] 70 - 130 % - - - - - - -    INR 0.85 - 1.15 2.53(H) 2.25(H) 2.56(H) 2.64(H) 2.69(H) 2.64(H) 2.49(H)          Most Recent 3 Troponin's:No lab results found.  Most Recent 3 BNP's:  Recent Labs   Lab Test 09/04/22  2234 06/17/22  1746 05/27/22  2215   NTBNPI 928* 4,611* 2,006*     Most Recent D-dimer:  Recent Labs   Lab Test 09/06/22  1333   DD 1.78*     Most Recent Cholesterol Panel:  Recent Labs   Lab Test 07/21/22  0331 07/11/22  0337 06/18/22  0334   CHOL  --   --  79   LDL  --   --  38   HDL  --   --  30*   TRIG 110   < > 53    < > = values in this interval not displayed.     Most Recent Hemoglobin A1c:  Recent Labs   Lab Test 05/20/22  0516   A1C 5.5     Most Recent 6 glucoses:  Recent Labs   Lab Test 09/07/22  0549 09/06/22  1333 09/05/22  0617 09/04/22  2234 09/01/22  0606 08/29/22  0804   GLC 83 112* 97 112* 94 104*     Most Recent Urinalysis:  Recent Labs   Lab Test 08/31/22  1542   COLOR Yellow   APPEARANCE Clear   URINEGLC Negative   URINEBILI Negative   URINEKETONE Negative   SG 1.014   UBLD Negative   URINEPH 5.5   PROTEIN 30 *   NITRITE Negative   LEUKEST Negative   RBCU <1   WBCU 4     Most Recent ESR & CRP:  Recent Labs   Lab Test 09/04/22  2234 08/03/22  0606 06/19/22  1522   SED  --   --  39*   CRP 13.50*   < > 28.0*    < > = values in this interval not displayed.     Most Recent Anemia Panel:  Recent Labs   Lab Test 09/07/22  0549 06/24/22  1711 06/24/22  1005   WBC 5.5   < >  --    HGB  9.3*   < >  --    HCT 29.8*   < >  --    MCV 93   < >  --       < >  --    IRON  --   --  42*   IRONSAT  --   --  16   FEB  --   --  260   SHAWN  --   --  365    < > = values in this interval not displayed.     Recent Results (from the past 24 hour(s))   Echocardiogram Complete   Result Value    LVEF  10-15% (severely reduced)    [facility]    [id number]  [id number]  [id number]  [id number]^[email or web address]     [facility],[place]  Echocardiography Laboratory  [address] [place] [id number]     Name: OLEG KAUR  MRN: [id number]  : 1960  Study Date: 2022 01:44 PM  Age: 61 yrs  Gender: Male  Patient Location: [facility]  Reason For Study: Chronic systolic congestive heart failure (H), LVAD (left  ventri  Ordering Physician: ADRIÁN NDIAYE  Referring Physician: ADRIÁN NDIAYE  Performed By: Chantal Malin     BSA: 1.7 m2  Height: 67 in  Weight: 130 lb  ______________________________________________________________________________  ______________________________________________________________________________  Interpretation Summary  HM3 at 5200 rpm.  At baseline -  Left ventricular function is decreased. The ejection fraction is 10-15%  (severely reduced). Septum is midline.  LVAD cannula was seen in the expected anatomic position in the LV apex.  Mild right ventricular dilation is present. Global right ventricular function  is mildly reduced.  The AV remains closed throughout the cardiac cycle. There is mild, continuous  aortic regurgitation.  The inferior vena cava was normal in size with preserved respiratory  variability.  No pericardial effusion is present.     Please refer to the EPIC report for measurements performed at different LVAD  speed settings.  ______________________________________________________________________________  Left Ventricle  Left ventricular function is decreased. The ejection fraction is 10-15%  (severely reduced). Please refer to the EPIC  report for measurements performed  at different LVAD speed settings. LVAD cannula was seen in the expected  anatomic position in the LV apex. Septum is midline.     Right Ventricle  Mild right ventricular dilation is present. Global right ventricular function  is mildly reduced.     Aortic Valve  The AV remains closed throughout the cardiac cycle. There is mild, continuous  aortic regurgitation.     Tricuspid Valve  Mild tricuspid insufficiency is present.     Vessels  The inferior vena cava was normal in size with preserved respiratory  variability.     Pericardium  No pericardial effusion is present.  ______________________________________________________________________________  MMode/2D Measurements & Calculations  IVSd: 0.88 cm  LVIDd: 3.9 cm  LVIDs: 3.7 cm  LVPWd: 0.99 cm  FS: 3.1 %  LV mass(C)d: 109.2 grams  LV mass(C)dI: 64.9 grams/m2  RWT: 0.52     ______________________________________________________________________________  Report approved by: Nkaia Gaines 09/06/2022 02:57 PM

## 2022-09-13 ENCOUNTER — APPOINTMENT (OUTPATIENT)
Dept: PHYSICAL THERAPY | Facility: CLINIC | Age: 62
DRG: 371 | End: 2022-09-13
Payer: COMMERCIAL

## 2022-09-13 LAB
ANION GAP SERPL CALCULATED.3IONS-SCNC: 7 MMOL/L (ref 7–15)
BUN SERPL-MCNC: 9.1 MG/DL (ref 8–23)
CALCIUM SERPL-MCNC: 8.9 MG/DL (ref 8.8–10.2)
CHLORIDE SERPL-SCNC: 106 MMOL/L (ref 98–107)
CREAT SERPL-MCNC: 0.63 MG/DL (ref 0.67–1.17)
DEPRECATED HCO3 PLAS-SCNC: 26 MMOL/L (ref 22–29)
GFR SERPL CREATININE-BSD FRML MDRD: >90 ML/MIN/1.73M2
GLUCOSE SERPL-MCNC: 90 MG/DL (ref 70–99)
HOLD SPECIMEN: NORMAL
INR PPP: 2.39 (ref 0.85–1.15)
MAGNESIUM SERPL-MCNC: 1.7 MG/DL (ref 1.7–2.3)
POTASSIUM SERPL-SCNC: 4.1 MMOL/L (ref 3.4–5.3)
SODIUM SERPL-SCNC: 139 MMOL/L (ref 136–145)

## 2022-09-13 PROCEDURE — 85027 COMPLETE CBC AUTOMATED: CPT

## 2022-09-13 PROCEDURE — 80048 BASIC METABOLIC PNL TOTAL CA: CPT | Performed by: INTERNAL MEDICINE

## 2022-09-13 PROCEDURE — 97530 THERAPEUTIC ACTIVITIES: CPT | Mod: GP

## 2022-09-13 PROCEDURE — 999N000127 HC STATISTIC PERIPHERAL IV START W US GUIDANCE

## 2022-09-13 PROCEDURE — 250N000012 HC RX MED GY IP 250 OP 636 PS 637

## 2022-09-13 PROCEDURE — 99233 SBSQ HOSP IP/OBS HIGH 50: CPT | Mod: 25 | Performed by: INTERNAL MEDICINE

## 2022-09-13 PROCEDURE — 250N000013 HC RX MED GY IP 250 OP 250 PS 637: Performed by: INTERNAL MEDICINE

## 2022-09-13 PROCEDURE — 120N000003 HC R&B IMCU UMMC

## 2022-09-13 PROCEDURE — 93750 INTERROGATION VAD IN PERSON: CPT | Performed by: INTERNAL MEDICINE

## 2022-09-13 PROCEDURE — 250N000013 HC RX MED GY IP 250 OP 250 PS 637

## 2022-09-13 PROCEDURE — 83735 ASSAY OF MAGNESIUM: CPT | Performed by: INTERNAL MEDICINE

## 2022-09-13 PROCEDURE — 85610 PROTHROMBIN TIME: CPT | Performed by: INTERNAL MEDICINE

## 2022-09-13 PROCEDURE — 36592 COLLECT BLOOD FROM PICC: CPT | Performed by: INTERNAL MEDICINE

## 2022-09-13 RX ORDER — HYDROCORTISONE 10 MG/G
CREAM TOPICAL 3 TIMES DAILY
Qty: 28 G | Refills: 0 | Status: ON HOLD | OUTPATIENT
Start: 2022-09-13 | End: 2022-10-17

## 2022-09-13 RX ORDER — VANCOMYCIN HYDROCHLORIDE 125 MG/1
125 CAPSULE ORAL 4 TIMES DAILY
Qty: 28 CAPSULE | Refills: 0 | Status: SHIPPED | OUTPATIENT
Start: 2022-09-13 | End: 2022-09-15

## 2022-09-13 RX ORDER — WARFARIN SODIUM 7.5 MG/1
7.5 TABLET ORAL
Status: COMPLETED | OUTPATIENT
Start: 2022-09-13 | End: 2022-09-13

## 2022-09-13 RX ADMIN — QUETIAPINE 25 MG: 25 TABLET ORAL at 14:30

## 2022-09-13 RX ADMIN — MYCOPHENOLATE MOFETIL 1500 MG: 500 TABLET, FILM COATED ORAL at 21:36

## 2022-09-13 RX ADMIN — WARFARIN SODIUM 7.5 MG: 7.5 TABLET ORAL at 17:45

## 2022-09-13 RX ADMIN — ATORVASTATIN CALCIUM 40 MG: 40 TABLET, FILM COATED ORAL at 21:35

## 2022-09-13 RX ADMIN — HYDROXYCHLOROQUINE SULFATE 200 MG: 200 TABLET, FILM COATED ORAL at 21:35

## 2022-09-13 RX ADMIN — DIGOXIN 125 MCG: 125 TABLET ORAL at 09:39

## 2022-09-13 RX ADMIN — Medication 3 MG: at 21:43

## 2022-09-13 RX ADMIN — HYDRALAZINE HYDROCHLORIDE 50 MG: 50 TABLET, FILM COATED ORAL at 14:30

## 2022-09-13 RX ADMIN — Medication 150 MG: at 21:35

## 2022-09-13 RX ADMIN — PANTOPRAZOLE SODIUM 40 MG: 40 TABLET, DELAYED RELEASE ORAL at 09:41

## 2022-09-13 RX ADMIN — MYCOPHENOLATE MOFETIL 1500 MG: 500 TABLET, FILM COATED ORAL at 09:40

## 2022-09-13 RX ADMIN — ACETAMINOPHEN 975 MG: 325 TABLET, FILM COATED ORAL at 12:07

## 2022-09-13 RX ADMIN — VANCOMYCIN HYDROCHLORIDE 125 MG: 125 CAPSULE ORAL at 21:35

## 2022-09-13 RX ADMIN — VANCOMYCIN HYDROCHLORIDE 125 MG: 125 CAPSULE ORAL at 12:07

## 2022-09-13 RX ADMIN — ACETAMINOPHEN 975 MG: 325 TABLET, FILM COATED ORAL at 21:35

## 2022-09-13 RX ADMIN — HYDRALAZINE HYDROCHLORIDE 50 MG: 50 TABLET, FILM COATED ORAL at 09:40

## 2022-09-13 RX ADMIN — ACETAMINOPHEN 975 MG: 325 TABLET, FILM COATED ORAL at 04:41

## 2022-09-13 RX ADMIN — VANCOMYCIN HYDROCHLORIDE 125 MG: 125 CAPSULE ORAL at 09:40

## 2022-09-13 RX ADMIN — HYDRALAZINE HYDROCHLORIDE 50 MG: 50 TABLET, FILM COATED ORAL at 21:35

## 2022-09-13 RX ADMIN — LISINOPRIL 10 MG: 5 TABLET ORAL at 09:40

## 2022-09-13 RX ADMIN — VANCOMYCIN HYDROCHLORIDE 125 MG: 125 CAPSULE ORAL at 16:12

## 2022-09-13 RX ADMIN — HYDROXYCHLOROQUINE SULFATE 200 MG: 200 TABLET, FILM COATED ORAL at 09:40

## 2022-09-13 RX ADMIN — QUETIAPINE 25 MG: 25 TABLET ORAL at 09:41

## 2022-09-13 RX ADMIN — Medication 1 TABLET: at 09:41

## 2022-09-13 RX ADMIN — THIAMINE HCL TAB 100 MG 100 MG: 100 TAB at 09:39

## 2022-09-13 RX ADMIN — SERTRALINE HYDROCHLORIDE 100 MG: 100 TABLET ORAL at 09:39

## 2022-09-13 ASSESSMENT — ACTIVITIES OF DAILY LIVING (ADL)
ADLS_ACUITY_SCORE: 30

## 2022-09-13 NOTE — PLAN OF CARE
Neuro: A&Ox4.  Baseline numbness and tingling in R hand.  Cardiac: SR with BBB.  Heartmate 3 LVAD.  MAPs btwn 72-80 all shift.   Respiratory: Sating above 95% on RA.  GI/: Adequate urine output.  Loose BM X2, fecal incontinence.  Diet/appetite: Tolerating Regular diet. Eating well.  Activity:  Stand-by assist, up to bedside commode and for a walk.  Pain: At acceptable level on current regimen.   Skin: No new deficits noted.  LDA's: L PIV SL.    Plan: Pt would like to leave tmrw if diarrhea is resolving.  Pt is concerned about leaving and being readmitted with dehydration.

## 2022-09-13 NOTE — PROGRESS NOTES
Care Management Discharge Note    Discharge Date: 09/13/2022     Discharge Disposition: Home    Discharge Services:  Home care    Discharge DME:  No DME    Discharge Transportation: family or friend will provide    Private pay costs discussed: Not applicable    Education Provided on the Discharge Plan: Yes  Persons Notified of Discharge Plans: Patient, home care   Patient/Family in Agreement with the Plan: Yes    Additional Information:  Patient is medically ready for discharge per the primary team. Patient had previously been accepted to [facility] for home nursing visits, they have been updated on discharge, resumption orders placed. Per discussion with pharmacy, patient will need an INR on Friday, 9/16,  order reflects this. No additional discharge needs noted.     Anne Riley, RNCC, BSN    [facility]    [address]  [address] [place] [address]    [email or web address]    Office: [phone] Pager: [phone]    To contact the weekend RNCC  [place] (0800 - 1630) Saturday and Sunday    Units: 4A, 4C, 4E, 5A and 5B- Pager 1: [phone]    Units: 6A, 6B, 6C, 6D- Pager 2: [phone]    Units: 7A, 7B, 7C, 7D, and 5C-Pager 3: [phone]

## 2022-09-13 NOTE — PLAN OF CARE
"Blood pressure (!) 103/92, pulse 96, temperature 97.9  F (36.6  C), temperature source Oral, resp. rate 22, height 1.702 m (5' 7\"), weight 62.3 kg (137 lb 5.6 oz), SpO2 100 %.  Pt VSS, on RA.  BP Dopplered.  C/o mild back pain occasionally, relieved with scheduled Tylenol.  LVAD HM 3, no alarms this shift.  PI running 6-9 throughout the day.  1L LR bolus was given.  Up to commode with SBA.  BM x 3 today, soft, not loose.  Good UOP.  Good appetite on regular diet.  Up to EOB for meals.  Pt refusing to sit in chair or walk in halls this shift, despite multiple attempts/encouragement.  K+ and Mag WNL, no replacements needed.  Call light within reach.  Continue with plan of care.                     "

## 2022-09-13 NOTE — PLAN OF CARE
"Goal Outcome Evaluation:    BP (!) 103/92 (BP Location: Right arm)   Pulse 101   Temp 98.2  F (36.8  C) (Oral)   Resp 20   Ht 1.702 m (5' 7\")   Wt 62.3 kg (137 lb 5.6 oz)   SpO2 96%   BMI 21.51 kg/m      Neuro: A&Ox4, pleasant and cooperative   Cardiac: SR-ST w/ 1st degree block  Respiratory:  Sating 95% on RA  GI/: voiding via urinal, no BM  Diet/appetite:  Regular diet orders  Activity:  Assist of 1  Pain: Denies   Skin: No new deficits noted  LDA's: LVAD, midline    Plan:  No low flow alarms noted during shift. Nursing will continue to follow the POC and update the MD team with concerns.    Shauna Rondon RN  6B Intermediate Care   "

## 2022-09-13 NOTE — PROGRESS NOTES
[facility]  Cardiology History and Physical - Cardiology    Date of Admission:  9/6/2022    Assessment & Plan: NICOLASJUAN      Dandy Sands is a 61 year old male admitted on 9/5/2022. He is a 61M with pmhx of SLE, antiphospholipid syndrome, hx of history pulmonary embolism (on anticoagulation with warfarin), HFrEF due to ICM, HLD. He initially presented to [facility] on 6/13 due to nausea, vomiting, abdominal bloating and transferred to [facility] for admission on 6/17/2022 for decompensated heart failure 2/2 cardiogenic shock c/b multiorgan failure requiring IABP, is s/p HM 3 LVAD on 7/8/2022. Hospitalization was also c/b RV, had 29F Protek duo via RIJ, RVAD removed 7/21, hemopericardium requiring repeat washout, chest was closed 7/13. He then developed epistaxis on 8/6 ENT. Patient has h/o nasal perforation that required elective procedure (in [place]) which was postponed due to LVAD insertion.  He required intubation 8/7 for airway protection, s/p extubated 8/8. Nasal pack removed 8/9. Patient recently discharged on 09/05 for epistaxis after 1h after taking warfarin.  Now admitted with c diff diarrhea and low flow alarms.     Changes today  - No changes    # Clostridium difficile infection, mild  # Low PI events  # Non-anion gap metabolic acidosis  # Moderate dehydration  1-week of diarrheas x5/day. Has had low PI events over the past 24 hrs (~2.1-2.4) No mucus or bleeding. Denies fever, night sweats, or abdominal pain. Afebrile on admission. Physical exam with dry mucous membranes. Negative enteropathogen panel.  - IVF    > 1L NaCl 0.9% over 4h on 09/06   > 1.5 L LR over 4h twice on 09/07 (to ameliorate acidosis)   > 1.5 L LR over 4h on 09/08 and 09/09   > 1.5 L LR over 4h on 09/10    > 1.5 L LR over 4h on 09/11   - Vancomycin 125mcg QID x10 days (09/08-P)  - ID consulted, no further recs    #HFrEF 2/2 ICM  in setting of cardiogenic shock (SCAI D) s/p HM3  LVAD  #RV failure s/p Protek duo temporary RVAD s/p decannulation 7/21  #RV thrombus  #Post chest closure hemopericardium s/p repeat washout (7/12)  #PMH CAD s/p PCI to LAD (2005)  #S/p CRT-D (2006)  Patient with ICM s/p HM3 LVAD 7/8/22 2/2 cardiogenic shock requiring MCS with IABP as prior to HM (initially evaluated for heart transplant, however noted to have substantially elevated DSAs during course of care, so decision made to proceed with LVAD given patient was already on temporary MCS). Intraoperative course during LVAD placement was c/b RVF resulting in cardiogenic shock requiring high dose pressors necessitating temporary RVAD support. Initial post-op course c/b hemopericardium requiring repeat washout with chest left open, with good recovery extubation and decannulation on 7/21. Patient tolerating hemodynamics and end organ standpoint well. He has had intermittent low flow alarms with high PI, no power spike, and a closed aortic valve on TTE. TTE also showed underfilling LV which could explain his low flow alarms d/t suckdown event so his LVAD speed was reduced to 5200. Patient did have a low flow alarm on 8/20 @ 1145 (PI 8-9 and not hypotensive); CTA chest done which showed that the LVAD outflow tract is widely patent. Patient is euvolemic on exam, but at times has had orthostasis symptoms; patient encouraged to stay hydrated within his fluid/diet restriction guidelines (no more than 2L/day of fluid intake and no more than 3g of Na per day in dietary intake).     - Interrogation showed 2 low flow alarms in the past 24 hrs before epistaxis (has had at least 6 low flow alarms over the past 5 days, has hx of hemorrhoidal bleeding x1 and soft stools)              > continue with IV fluids   - Volume status: Euvolemic              > Diuretics: Not needed at this time  - Afterload reduction: MAP goal 65-80   >  Continue Hydralazine 37.5 TID    >  Continue Lisinopril 2.5 qday  - Statin: Atorvastatin 40mg  - BB:  Holding in setting of recent cardiogenic shock and orthostatic symptoms, consider restarting outpatient  - AA: Holding in setting of recent cardiogenic shock and orthostatic symptoms, consider restarting outpatient  - SGLTi: As outpatient, has been held due to immunosuppression he is on for his SLE.  - Anticoagulation: Continue PTA Warfarin   - Antiplatelets: Holding PTA ASA 81mg qday given hx of epistaxis  -       #Epistaxis, improved  # Hx Intubated due to Concern for airway protection (8/7)-Extubated (8/8)  # Mild-moderate emphysema  # History of COVID-19  # Iatrogenic R apical PTX  H/o nasal perforation  For elective procedure (in [place]), postponed 2/2 LVAD insertion. Epistaxis not fixed by packing overnight on 8/6 and pt continued to bleed. Intubated for airway protection. Controlled at bedside by ENT s/p cautery and packing. Extubated 8/8. Had Cefepime/Vanc empirically for broad coverage for PNA (8/7-8/12). Patient removed his own nasal packing overnight on 8/9. Now admitted for profuse bleeding 1h after taking warfarin. ENT performed nasal package ~2h.  - started nasal saline   - Follow with Dr Vazquez (EN) in 2 weeks   - Maintain nasal package       #GERD  #Hx of Hematemesis / oral mucosal bleeding   #Dysphagia  GI consulted during a prior admission 7/31 for black tarry stools and a possible GI bleed, however it is more likely swallowed blood from epistaxis that patient previously had. GI did not recommend an upper endoscopy. Recurrence of bloody stools likely due to epistaxis which have resolved. Hemoglobin was monitored and continued to be stable throughout the rest of the hospitalization. Cleared for regular diet by SLP.  - Regular diet   - Remove fluid restriction     #Anemia due to blood loss from Epistaxis, stable  #History of DVT and PE   #Antiphospholipid antibody syndrome  #History of iron deficiency anemia  Hb stable 11.1  - Transfuse if Hb<7 or actively bleeding       #SLE  - continue  home hydroxychloroquine and MMF     Diet:  Na 2g diet  DVT Prophylaxis: Warfarin   Fitzgerald Catheter: Not present  Code Status: Full Code  Lines: PIV     Disposition Plan   Expected discharge: 1-2 days, recommended to prior living arrangement once fluid volume status optimized on oral medication.       The patient's care was discussed with the Attending Physician, Dr. Lora.    Kristie Bowles  Internal Medicine PGY-2  ________________________________________________  Chief Complaint   Loose stools    Interval Progress  NO LVAD alarms overnight. Nursing notes reviewed. He was potentially ready to be discharged but had a watery bowel movement and patient did not feel comfortable with it. Denies chest pain, palpitations, lightheadedness, dizziness, abdominal pain.     Review of Systems    The 10 point Review of Systems is negative other than noted in the HPI or here.   Prior to Admission Medications   Prior to Admission Medications   Prescriptions Last Dose Informant Patient Reported? Taking?   Melatonin 10 MG TABS tablet   No No   Sig: Take 1 tablet (10 mg) by mouth At Bedtime for 30 days   QUEtiapine (SEROQUEL) 25 MG tablet   No No   Sig: Take 1 tablet (25 mg) by mouth 2 times daily for 30 days   QUEtiapine (SEROQUEL) 50 MG tablet   No No   Sig: Take 3 tablets (150 mg) by mouth At Bedtime for 30 days   acetaminophen (TYLENOL) 325 MG tablet   No No   Sig: Take 3 tablets (975 mg) by mouth every 8 hours for 30 days   atorvastatin (LIPITOR) 40 MG tablet   No No   Sig: Take 1 tablet (40 mg) by mouth every evening for 30 days   digoxin (LANOXIN) 125 MCG tablet   No No   Sig: Take 1 tablet (125 mcg) by mouth daily for 30 days   fluticasone-vilanterol (BREO ELLIPTA) 100-25 MCG/INH inhaler   No No   Sig: Inhale 1 puff into the lungs daily for 30 days   hydrALAZINE (APRESOLINE) 50 MG tablet   No No   Sig: Take 1 tablet (50 mg) by mouth 3 times daily for 30 days   hydrOXYzine (ATARAX) 25 MG tablet   No No   Sig: Take 1  tablet (25 mg) by mouth every 6 hours as needed for anxiety (ANXIETY)   hydrocortisone 1 % CREA cream   No No   Sig: Place rectally 3 times daily for 2 days   hydroxychloroquine (PLAQUENIL) 200 MG tablet   Yes No   Sig: Take 200 mg by mouth 2 times daily   lisinopril (ZESTRIL) 10 MG tablet   No No   Sig: Take 1 tablet (10 mg) by mouth daily for 30 days   melatonin 3 MG tablet   Yes No   Sig: Take 6 mg by mouth nightly as needed for sleep   multivitamin w/minerals (THERA-VIT-M) tablet   No No   Sig: Take 1 tablet by mouth daily   mycophenolate (GENERIC EQUIVALENT) 500 MG tablet   No No   Sig: Take 3 tablets (1,500 mg) by mouth 2 times daily for 30 days   pantoprazole (PROTONIX) 40 MG EC tablet   No No   Sig: Take 1 tablet (40 mg) by mouth daily   pramox-pe-glycerin-petrolatum (PREPARATION H) 1-0.25-14.4-15 % CREA cream   No No   Sig: Place rectally 2 times daily as needed for hemorrhoids Apply immediately after a bowel movement.   promethazine (PHENERGAN) 12.5 MG tablet   No No   Sig: Take 1 tablet (12.5 mg) by mouth every 6 hours as needed for nausea or vomiting   sertraline (ZOLOFT) 100 MG tablet   No No   Sig: Take 1 tablet (100 mg) by mouth daily for 30 days   thiamine (B-1) 100 MG tablet   No No   Sig: Take 1 tablet (100 mg) by mouth daily   warfarin ANTICOAGULANT (COUMADIN) 2.5 MG tablet   No No   Sig: Take 2 tabs (5mg) daily at bedtime. Future dose adjustments pending INR   zolpidem (AMBIEN) 5 MG tablet   Yes No   Sig: Take 5 mg by mouth nightly as needed for sleep      Facility-Administered Medications: None     Allergies   No Known Allergies    Physical Exam   Vital Signs: Temp: 97.7  F (36.5  C) Temp src: Temporal BP: 100/84 Pulse: 92   Resp: 21 SpO2: 100 % O2 Device: None (Room air)    Weight: 130 lbs 0 oz    Constitutional: awake, alert, cooperative, no acute distress, no d  Hematologic / Lymphatic: no cervical lymphadenopathy and no supraclavicular lymphadenopathy  Respiratory: No increased work of  breathing, good air exchange, clear to auscultation bilaterally, no crackles or wheezing  Cardiovascular: LVAD hum   Abdomen: soft nt nd, no guarding   Musculoskeletal: Normal capillary refill, warm to touch extremities.   Neurologic: Awake, alert, oriented to name, place and time.  Cranial nerves II-XII are grossly intact.  No focal deficit.     Data   Data reviewed today: I reviewed all medications, new labs and imaging results over the last 24 hours. I personally reviewed Cardiology specific data review: the EKG tracing showing no ischemic ST/T changes. Sinus tachycardia     Recent Labs   Lab 09/07/22  0549 09/06/22  1333 09/05/22  0617 09/04/22  2333 09/04/22  2234 09/04/22  0601 09/01/22  0606   WBC 5.5 7.5 7.0  --  6.7  --   --    HGB 9.3* 10.4* 9.4*  --  11.1* 11.1*  --    MCV 93 93 98  --  92  --   --     308 235  --  307  --   --    INR  --  2.49*  --  2.64*  --  2.69*  --     138 135*  --  137  --   --    POTASSIUM 3.8 4.1 4.1  --  4.2  --    < >   CHLORIDE 108* 106 105  --  106  --    < >   CO2 18* 21* 18*  --  16*  --    < >   BUN 11.3 15.0 24.2*  --  17.6  --    < >   CR 0.57* 0.79 0.50*  --  0.59*  --   --    ANIONGAP 11 11 12  --  15  --    < >   ELDA 9.0 9.8 9.1  --  9.5  --   --    GLC 83 112* 97  --  112*  --    < >   ALBUMIN  --  4.1  --   --  3.8  --   --    PROTTOTAL  --  7.7  --   --  7.7  --   --    BILITOTAL  --  0.4  --   --  0.4  --   --    ALKPHOS  --  136*  --   --  133*  --   --    ALT  --  20  --   --  15  --   --    AST  --  30  --   --  42  --   --     < > = values in this interval not displayed.     Most Recent 3 CBC's:  Recent Labs   Lab Test 09/07/22  0549 09/06/22  1333 09/05/22  0617   WBC 5.5 7.5 7.0   HGB 9.3* 10.4* 9.4*   MCV 93 93 98    308 235     Most Recent 3 BMP's:  Recent Labs   Lab Test 09/07/22  0549 09/06/22  1333 09/05/22  0617    138 135*   POTASSIUM 3.8 4.1 4.1   CHLORIDE 108* 106 105   CO2 18* 21* 18*   BUN 11.3 15.0 24.2*   CR 0.57* 0.79  0.50*   ANIONGAP 11 11 12   ELDA 9.0 9.8 9.1   GLC 83 112* 97     Most Recent 2 LFT's:  Recent Labs   Lab Test 09/06/22  1333 09/04/22  2234   AST 30 42   ALT 20 15   ALKPHOS 136* 133*   BILITOTAL 0.4 0.4     Most Recent 3 INR's:  Recent Labs   Lab Test 09/06/22  1333 09/04/22  2333 09/04/22  0601   INR 2.49* 2.64* 2.69*     Anticoagulation Dose History     Recent Dosing and Labs Latest Ref Rng & Units 8/31/2022 9/1/2022 9/2/2022 9/3/2022 9/4/2022 9/4/2022 9/6/2022    Warfarin 3 mg - 6 mg - - - - - -    Warfarin 5 mg - - - 5 mg - - - -    Warfarin 7.5 mg - - 7.5 mg - 7.5 mg - - -    [id number] 70 - 130 % - - - - - - -    INR 0.85 - 1.15 2.53(H) 2.25(H) 2.56(H) 2.64(H) 2.69(H) 2.64(H) 2.49(H)          Most Recent 3 Troponin's:No lab results found.  Most Recent 3 BNP's:  Recent Labs   Lab Test 09/04/22 2234 06/17/22  1746 05/27/22  2215   NTBNPI 928* 4,611* 2,006*     Most Recent D-dimer:  Recent Labs   Lab Test 09/06/22  1333   DD 1.78*     Most Recent Cholesterol Panel:  Recent Labs   Lab Test 07/21/22  0331 07/11/22  0337 06/18/22  0334   CHOL  --   --  79   LDL  --   --  38   HDL  --   --  30*   TRIG 110   < > 53    < > = values in this interval not displayed.     Most Recent Hemoglobin A1c:  Recent Labs   Lab Test 05/20/22  0516   A1C 5.5     Most Recent 6 glucoses:  Recent Labs   Lab Test 09/07/22  0549 09/06/22  1333 09/05/22  0617 09/04/22  2234 09/01/22  0606 08/29/22  0804   GLC 83 112* 97 112* 94 104*     Most Recent Urinalysis:  Recent Labs   Lab Test 08/31/22  1542   COLOR Yellow   APPEARANCE Clear   URINEGLC Negative   URINEBILI Negative   URINEKETONE Negative   SG 1.014   UBLD Negative   URINEPH 5.5   PROTEIN 30 *   NITRITE Negative   LEUKEST Negative   RBCU <1   WBCU 4     Most Recent ESR & CRP:  Recent Labs   Lab Test 09/04/22  2234 08/03/22  0606 06/19/22  1522   SED  --   --  39*   CRP 13.50*   < > 28.0*    < > = values in this interval not displayed.     Most Recent Anemia Panel:  Recent Labs    Lab Test 22  0549 22  1711 22  1005   WBC 5.5   < >  --    HGB 9.3*   < >  --    HCT 29.8*   < >  --    MCV 93   < >  --       < >  --    IRON  --   --  42*   IRONSAT  --   --  16   FEB  --   --  260   SHAWN  --   --  365    < > = values in this interval not displayed.     Recent Results (from the past 24 hour(s))   Echocardiogram Complete   Result Value    LVEF  10-15% (severely reduced)    Merged with [facility]    [id number]  [id number]  [id number]  [id number]^ZELDA^ADRIÁN^MARCUS     [facility] [place]  Echocardiography Laboratory  [address] [place] [address]     Name: OLEG KAUR  MRN: [id number]  : 1960  Study Date: 2022 01:44 PM  Age: 61 yrs  Gender: Male  Patient Location: [facility]  Reason For Study: Chronic systolic congestive heart failure (H), LVAD (left  ventri  Ordering Physician: ADRIÁN NDIAYE  Referring Physician: ADRIÁN NDIAYE  Performed By: Chantal Malin     BSA: 1.7 m2  Height: 67 in  Weight: 130 lb  ______________________________________________________________________________  ______________________________________________________________________________  Interpretation Summary  HM3 at 5200 rpm.  At baseline -  Left ventricular function is decreased. The ejection fraction is 10-15%  (severely reduced). Septum is midline.  LVAD cannula was seen in the expected anatomic position in the LV apex.  Mild right ventricular dilation is present. Global right ventricular function  is mildly reduced.  The AV remains closed throughout the cardiac cycle. There is mild, continuous  aortic regurgitation.  The inferior vena cava was normal in size with preserved respiratory  variability.  No pericardial effusion is present.     Please refer to the EPIC report for measurements performed at different LVAD  speed settings.  ______________________________________________________________________________  Left Ventricle  Left ventricular function is  decreased. The ejection fraction is 10-15%  (severely reduced). Please refer to the EPIC report for measurements performed  at different LVAD speed settings. LVAD cannula was seen in the expected  anatomic position in the LV apex. Septum is midline.     Right Ventricle  Mild right ventricular dilation is present. Global right ventricular function  is mildly reduced.     Aortic Valve  The AV remains closed throughout the cardiac cycle. There is mild, continuous  aortic regurgitation.     Tricuspid Valve  Mild tricuspid insufficiency is present.     Vessels  The inferior vena cava was normal in size with preserved respiratory  variability.     Pericardium  No pericardial effusion is present.  ______________________________________________________________________________  MMode/2D Measurements & Calculations  IVSd: 0.88 cm  LVIDd: 3.9 cm  LVIDs: 3.7 cm  LVPWd: 0.99 cm  FS: 3.1 %  LV mass(C)d: 109.2 grams  LV mass(C)dI: 64.9 grams/m2  RWT: 0.52     ______________________________________________________________________________  Report approved by: Nakia Gaines 09/06/2022 02:57 PM

## 2022-09-13 NOTE — PLAN OF CARE
Physical Therapy Discharge Summary    Reason for therapy discharge:    All goals and outcomes met, no further needs identified.    Progress towards therapy goal(s). See goals on Care Plan in [facility] electronic health record for goal details.  Goals met  Pt is safe to discharge without completing PT goal 1 as pt has not demonstrated the need to complete a balance assessment.      Therapy recommendation(s):    Continued therapy is recommended.  Rationale/Recommendations:  Rec OP CR to continue to improve aerobic endurance and functional independence.

## 2022-09-14 LAB
ANION GAP SERPL CALCULATED.3IONS-SCNC: 11 MMOL/L (ref 7–15)
BUN SERPL-MCNC: 11.1 MG/DL (ref 8–23)
CALCIUM SERPL-MCNC: 9.3 MG/DL (ref 8.8–10.2)
CHLORIDE SERPL-SCNC: 107 MMOL/L (ref 98–107)
CREAT SERPL-MCNC: 0.62 MG/DL (ref 0.67–1.17)
DEPRECATED HCO3 PLAS-SCNC: 24 MMOL/L (ref 22–29)
ERYTHROCYTE [DISTWIDTH] IN BLOOD BY AUTOMATED COUNT: 17.6 % (ref 10–15)
GFR SERPL CREATININE-BSD FRML MDRD: >90 ML/MIN/1.73M2
GLUCOSE SERPL-MCNC: 88 MG/DL (ref 70–99)
HCT VFR BLD AUTO: 29.5 % (ref 40–53)
HGB BLD-MCNC: 8.8 G/DL (ref 13.3–17.7)
INR PPP: 2.31 (ref 0.85–1.15)
MAGNESIUM SERPL-MCNC: 1.6 MG/DL (ref 1.7–2.3)
MAGNESIUM SERPL-MCNC: 1.7 MG/DL (ref 1.7–2.3)
MCH RBC QN AUTO: 29.3 PG (ref 26.5–33)
MCHC RBC AUTO-ENTMCNC: 29.8 G/DL (ref 31.5–36.5)
MCV RBC AUTO: 98 FL (ref 78–100)
PLATELET # BLD AUTO: 232 10E3/UL (ref 150–450)
POTASSIUM SERPL-SCNC: 4.2 MMOL/L (ref 3.4–5.3)
POTASSIUM SERPL-SCNC: 4.4 MMOL/L (ref 3.4–5.3)
RBC # BLD AUTO: 3 10E6/UL (ref 4.4–5.9)
SODIUM SERPL-SCNC: 142 MMOL/L (ref 136–145)
WBC # BLD AUTO: 5.6 10E3/UL (ref 4–11)

## 2022-09-14 PROCEDURE — 250N000013 HC RX MED GY IP 250 OP 250 PS 637: Performed by: INTERNAL MEDICINE

## 2022-09-14 PROCEDURE — 36415 COLL VENOUS BLD VENIPUNCTURE: CPT | Performed by: INTERNAL MEDICINE

## 2022-09-14 PROCEDURE — 99233 SBSQ HOSP IP/OBS HIGH 50: CPT | Mod: 25 | Performed by: INTERNAL MEDICINE

## 2022-09-14 PROCEDURE — 250N000012 HC RX MED GY IP 250 OP 636 PS 637

## 2022-09-14 PROCEDURE — 250N000009 HC RX 250: Performed by: INTERNAL MEDICINE

## 2022-09-14 PROCEDURE — 83735 ASSAY OF MAGNESIUM: CPT | Performed by: INTERNAL MEDICINE

## 2022-09-14 PROCEDURE — 272N000451 HC KIT SHRLOCK 5FR POWER PICC DOUBLE LUMEN

## 2022-09-14 PROCEDURE — 84132 ASSAY OF SERUM POTASSIUM: CPT | Performed by: INTERNAL MEDICINE

## 2022-09-14 PROCEDURE — 83735 ASSAY OF MAGNESIUM: CPT

## 2022-09-14 PROCEDURE — 250N000013 HC RX MED GY IP 250 OP 250 PS 637

## 2022-09-14 PROCEDURE — 250N000011 HC RX IP 250 OP 636: Performed by: INTERNAL MEDICINE

## 2022-09-14 PROCEDURE — 93750 INTERROGATION VAD IN PERSON: CPT | Performed by: INTERNAL MEDICINE

## 2022-09-14 PROCEDURE — 258N000003 HC RX IP 258 OP 636

## 2022-09-14 PROCEDURE — 36415 COLL VENOUS BLD VENIPUNCTURE: CPT

## 2022-09-14 PROCEDURE — 120N000003 HC R&B IMCU UMMC

## 2022-09-14 PROCEDURE — 80048 BASIC METABOLIC PNL TOTAL CA: CPT

## 2022-09-14 PROCEDURE — 999N000127 HC STATISTIC PERIPHERAL IV START W US GUIDANCE

## 2022-09-14 PROCEDURE — 85610 PROTHROMBIN TIME: CPT | Performed by: INTERNAL MEDICINE

## 2022-09-14 PROCEDURE — 36569 INSJ PICC 5 YR+ W/O IMAGING: CPT

## 2022-09-14 RX ORDER — ASPIRIN 81 MG/1
81 TABLET, CHEWABLE ORAL DAILY
Status: ON HOLD | COMMUNITY
End: 2022-09-15

## 2022-09-14 RX ORDER — LIDOCAINE 40 MG/G
CREAM TOPICAL
Status: DISCONTINUED | OUTPATIENT
Start: 2022-09-14 | End: 2022-09-15 | Stop reason: HOSPADM

## 2022-09-14 RX ORDER — TAMSULOSIN HYDROCHLORIDE 0.4 MG/1
0.4 CAPSULE ORAL DAILY
Status: ON HOLD | COMMUNITY
End: 2022-10-17

## 2022-09-14 RX ORDER — MAGNESIUM SULFATE HEPTAHYDRATE 40 MG/ML
4 INJECTION, SOLUTION INTRAVENOUS ONCE
Status: COMPLETED | OUTPATIENT
Start: 2022-09-14 | End: 2022-09-14

## 2022-09-14 RX ORDER — MAGNESIUM SULFATE HEPTAHYDRATE 40 MG/ML
2 INJECTION, SOLUTION INTRAVENOUS ONCE
Status: DISCONTINUED | OUTPATIENT
Start: 2022-09-14 | End: 2022-09-14

## 2022-09-14 RX ORDER — FERROUS SULFATE 325(65) MG
325 TABLET ORAL
Status: ON HOLD | COMMUNITY
End: 2022-09-15

## 2022-09-14 RX ORDER — GUAR GUM
1 PACKET (EA) ORAL DAILY
Status: DISCONTINUED | OUTPATIENT
Start: 2022-09-14 | End: 2022-09-15 | Stop reason: HOSPADM

## 2022-09-14 RX ORDER — SODIUM CHLORIDE/ALOE VERA
1 GEL (GRAM) NASAL
COMMUNITY
End: 2023-08-24

## 2022-09-14 RX ORDER — WARFARIN SODIUM 5 MG/1
5 TABLET ORAL
Status: COMPLETED | OUTPATIENT
Start: 2022-09-14 | End: 2022-09-14

## 2022-09-14 RX ADMIN — LISINOPRIL 10 MG: 5 TABLET ORAL at 08:23

## 2022-09-14 RX ADMIN — MYCOPHENOLATE MOFETIL 1500 MG: 500 TABLET, FILM COATED ORAL at 21:17

## 2022-09-14 RX ADMIN — SODIUM CHLORIDE, POTASSIUM CHLORIDE, SODIUM LACTATE AND CALCIUM CHLORIDE 500 ML: 600; 310; 30; 20 INJECTION, SOLUTION INTRAVENOUS at 08:28

## 2022-09-14 RX ADMIN — VANCOMYCIN HYDROCHLORIDE 125 MG: 125 CAPSULE ORAL at 16:10

## 2022-09-14 RX ADMIN — MYCOPHENOLATE MOFETIL 1500 MG: 500 TABLET, FILM COATED ORAL at 08:21

## 2022-09-14 RX ADMIN — DIGOXIN 125 MCG: 125 TABLET ORAL at 08:23

## 2022-09-14 RX ADMIN — ACETAMINOPHEN 975 MG: 325 TABLET, FILM COATED ORAL at 21:11

## 2022-09-14 RX ADMIN — LIDOCAINE HYDROCHLORIDE 2 ML: 10 INJECTION, SOLUTION EPIDURAL; INFILTRATION; INTRACAUDAL; PERINEURAL at 13:57

## 2022-09-14 RX ADMIN — SODIUM CHLORIDE, POTASSIUM CHLORIDE, SODIUM LACTATE AND CALCIUM CHLORIDE 1000 ML: 600; 310; 30; 20 INJECTION, SOLUTION INTRAVENOUS at 13:54

## 2022-09-14 RX ADMIN — ATORVASTATIN CALCIUM 40 MG: 40 TABLET, FILM COATED ORAL at 21:12

## 2022-09-14 RX ADMIN — SODIUM CHLORIDE, POTASSIUM CHLORIDE, SODIUM LACTATE AND CALCIUM CHLORIDE 1500 ML: 600; 310; 30; 20 INJECTION, SOLUTION INTRAVENOUS at 17:40

## 2022-09-14 RX ADMIN — ACETAMINOPHEN 975 MG: 325 TABLET, FILM COATED ORAL at 12:18

## 2022-09-14 RX ADMIN — HYDRALAZINE HYDROCHLORIDE 50 MG: 50 TABLET, FILM COATED ORAL at 21:11

## 2022-09-14 RX ADMIN — Medication 1 TABLET: at 08:22

## 2022-09-14 RX ADMIN — MAGNESIUM SULFATE HEPTAHYDRATE 4 G: 40 INJECTION, SOLUTION INTRAVENOUS at 13:54

## 2022-09-14 RX ADMIN — PANTOPRAZOLE SODIUM 40 MG: 40 TABLET, DELAYED RELEASE ORAL at 08:22

## 2022-09-14 RX ADMIN — Medication 150 MG: at 21:11

## 2022-09-14 RX ADMIN — WARFARIN SODIUM 5 MG: 5 TABLET ORAL at 17:40

## 2022-09-14 RX ADMIN — ACETAMINOPHEN 975 MG: 325 TABLET, FILM COATED ORAL at 04:04

## 2022-09-14 RX ADMIN — HYDROXYCHLOROQUINE SULFATE 200 MG: 200 TABLET, FILM COATED ORAL at 21:11

## 2022-09-14 RX ADMIN — Medication 3 MG: at 21:11

## 2022-09-14 RX ADMIN — VANCOMYCIN HYDROCHLORIDE 125 MG: 125 CAPSULE ORAL at 12:19

## 2022-09-14 RX ADMIN — HYDROXYCHLOROQUINE SULFATE 200 MG: 200 TABLET, FILM COATED ORAL at 08:22

## 2022-09-14 RX ADMIN — THIAMINE HCL TAB 100 MG 100 MG: 100 TAB at 08:23

## 2022-09-14 RX ADMIN — HYDRALAZINE HYDROCHLORIDE 50 MG: 50 TABLET, FILM COATED ORAL at 08:22

## 2022-09-14 RX ADMIN — VANCOMYCIN HYDROCHLORIDE 125 MG: 125 CAPSULE ORAL at 08:22

## 2022-09-14 RX ADMIN — SERTRALINE HYDROCHLORIDE 100 MG: 100 TABLET ORAL at 08:22

## 2022-09-14 RX ADMIN — VANCOMYCIN HYDROCHLORIDE 125 MG: 125 CAPSULE ORAL at 21:11

## 2022-09-14 RX ADMIN — QUETIAPINE 25 MG: 25 TABLET ORAL at 08:23

## 2022-09-14 RX ADMIN — HYDRALAZINE HYDROCHLORIDE 50 MG: 50 TABLET, FILM COATED ORAL at 13:51

## 2022-09-14 RX ADMIN — QUETIAPINE 25 MG: 25 TABLET ORAL at 13:53

## 2022-09-14 ASSESSMENT — ACTIVITIES OF DAILY LIVING (ADL)
ADLS_ACUITY_SCORE: 36
ADLS_ACUITY_SCORE: 30
ADLS_ACUITY_SCORE: 36
ADLS_ACUITY_SCORE: 30
ADLS_ACUITY_SCORE: 36
ADLS_ACUITY_SCORE: 36
ADLS_ACUITY_SCORE: 30
ADLS_ACUITY_SCORE: 36

## 2022-09-14 NOTE — PROGRESS NOTES
At 0815 patient triggered low flow alarm of 1.9 Lpm after moving to edge of bed. MD in the room. Low flow alarm triggered 3x, patient light headed but symptoms resolved quickly.     At 0855 patient triggered low flow alarm of 2.4 Lpm after getting to the commode x1.

## 2022-09-14 NOTE — PLAN OF CARE
Neuro: A&Ox4. Calls appropriately. Baseline numbness and tingling in R hand.  Cardiac: Sinus Rhythm with a bundle branch block. Heartmate 3. PSI drops with activity, low flow alarms triggered x3 in the AM when patient sat on side of bed and transferred to the commode. MD aware. 1.5 L bolus given this morning and 1.5 L bolus currently infusing.  Respiratory: Sating >95% on RA.   GI/: Adequate urine output. Patient experiencing diarrhea with BMx2 this shift.   Diet/appetite: Tolerating regular diet. Eating well.  Activity:  SBA  Pain: At acceptable level on current regimen. Minimal back pain.  Skin: No new deficits noted.  LDA's: R Midline placed today.     Plan: Magnesium replaced, continue with POC. Notify primary team with changes.

## 2022-09-14 NOTE — PROCEDURES
[facility]    Double Lumen Midline Placement    Date/Time: 9/14/2022 1:51 PM  Performed by: Susan Goodman RN  Authorized by: Ceferino Thomas MD   Indications: vascular access      UNIVERSAL PROTOCOL   Site Marked: Yes  Prior Images Obtained and Reviewed:  Yes  Required items: Required blood products, implants, devices and special equipment available    Patient identity confirmed:  Verbally with patient  Patient was reevaluated immediately before administering moderate or deep sedation or anesthesia  Confirmation Checklist:  Patient's identity using two indicators, relevant allergies, procedure was appropriate and matched the consent or emergent situation and correct equipment/implants were available  Time out: Immediately prior to the procedure a time out was called    Universal Protocol: the Joint Commission Universal Protocol was followed    Preparation: Patient was prepped and draped in usual sterile fashion       ANESTHESIA    Anesthesia: See MAR for details  Local Anesthetic:  Lidocaine 1% without epinephrine  Anesthetic Total (mL):  2      SEDATION    Patient Sedated: No        Preparation: skin prepped with ChloraPrep  Skin prep agent: skin prep agent completely dried prior to procedure  Sterile barriers: maximum sterile barriers were used: cap, mask, sterile gown, sterile gloves, and large sterile sheet  Hand hygiene: hand hygiene performed prior to central venous catheter insertion  Type of line used: Midline  Catheter type: double lumen  Lumen type: non-valved  Catheter size: 5 Fr  Brand: Bard  Lot number: [id number]  Placement method: venipuncture, MST and ultrasound  Number of attempts: 1  Difficulty threading catheter: no  Successful placement: yes  Orientation: right    Location: brachial vein (lateral) (vein diameter- 0.63cm)  Arm circumference: adults 10 cm  Extremity circumference: 23.5  Visible catheter length: 0  Total catheter length: 20  Dressing and  securement: alcohol impregnated caps, blood cleaned with CHG, chlorhexidine disc applied, site cleansed, statlock, sterile dressing applied and transparent dressing  Post procedure assessment: blood return through all ports and free fluid flow  PROCEDURE   Patient Tolerance:  Patient tolerated the procedure well with no immediate complicationsDescribe Procedure: Midline okay to use.         yes...

## 2022-09-14 NOTE — PROGRESS NOTES
[facility]  Cardiology History and Physical - Cardiology    Date of Admission:  9/6/2022    Assessment & Plan: MARISOL Domran EDUARD Sands is a 61 year old male admitted on 9/5/2022. He is a 61M with pmhx of SLE, antiphospholipid syndrome, hx of history pulmonary embolism (on anticoagulation with warfarin), HFrEF due to ICM, HLD. He initially presented to [facility] on 6/13 due to nausea, vomiting, abdominal bloating and transferred to [facility] for admission on 6/17/2022 for decompensated heart failure 2/2 cardiogenic shock c/b multiorgan failure requiring IABP, is s/p HM 3 LVAD on 7/8/2022. Hospitalization was also c/b RV, had 29F Protek duo via RIJ, RVAD removed 7/21, hemopericardium requiring repeat washout, chest was closed 7/13. He then developed epistaxis on 8/6 ENT. Patient has h/o nasal perforation that required elective procedure (in [place]) which was postponed due to LVAD insertion.  He required intubation 8/7 for airway protection, s/p extubated 8/8. Nasal pack removed 8/9. Patient recently discharged on 09/05 for epistaxis after 1h after taking warfarin.  Now admitted with c diff diarrhea and low flow alarms.     Changes today  - Reconsulted ID again given worsening diarrheas, moderate dehydration and increased frequency of low flows, pending recs  - 1.5 L LR over 4h on 09/14  - Reached out to Pharmacist regarding MMF doses. Patient has been in 1500mg BID since October 2021, there is actually a note form Rheumatology from 07/20/22 recommending this doses.   - Increased fiber (Nutrisource) in diet to improve diarrheas  - IV Magnesium 4g for Mg 1.6  - Disposition plan: Once no LVAD alarms, improved fluid status, formed stools    # Clostridium difficile infection, mild  # Low flow events  # High PI events, resolved s/p increased speed  # Moderate dehydration  1-week of diarrheas x5/day. Has had low PI events over the past 24 hrs (~2.1-2.4) No mucus  or bleeding. Denies fever, night sweats, or abdominal pain. Afebrile on admission. Physical exam with moist mucous membranes. Negative enteropathogen panel. Continues with low flow alarms and diarrheas despite being in his day 5 of PO Vancomycin. High PI index has resolved after speed adjustments from 5100 to 5200  - IVF    > 1L NaCl 0.9% over 4h on 09/06   > 1.5 L LR over 4h twice on 09/07 (to ameliorate acidosis)   > 1.5 L LR over 4h on 09/08 and 09/09   > 1.5 L LR over 4h on 09/10    > 1.5 L LR over 4h on 09/11    > 1.5 L LR over 4h on 09/14  - Vancomycin 125mcg QID x10 days (09/08-P)  - Reconsulted ID again given worsening diarrheas, moderate dehydration and increased frequency of low flows, pending recs    #HFrEF 2/2 ICM  in setting of cardiogenic shock (SCAI D) s/p HM3 LVAD  #RV failure s/p Protek duo temporary RVAD s/p decannulation 7/21  #RV thrombus  #Post chest closure hemopericardium s/p repeat washout (7/12)  #PMH CAD s/p PCI to LAD (2005)  #S/p CRT-D (2006)  Patient with ICM s/p HM3 LVAD 7/8/22 2/2 cardiogenic shock requiring MCS with IABP as prior to HM (initially evaluated for heart transplant, however noted to have substantially elevated DSAs during course of care, so decision made to proceed with LVAD given patient was already on temporary MCS). Intraoperative course during LVAD placement was c/b RVF resulting in cardiogenic shock requiring high dose pressors necessitating temporary RVAD support. Initial post-op course c/b hemopericardium requiring repeat washout with chest left open, with good recovery extubation and decannulation on 7/21. Patient tolerating hemodynamics and end organ standpoint well. He has had intermittent low flow alarms with high PI, no power spike, and a closed aortic valve on TTE. TTE also showed underfilling LV which could explain his low flow alarms d/t suckdown event so his LVAD speed was reduced to 5200. Patient did have a low flow alarm on 8/20 @ 1145 (PI 8-9 and not  hypotensive); CTA chest done which showed that the LVAD outflow tract is widely patent. Patient is euvolemic on exam, but at times has had orthostasis symptoms; patient encouraged to stay hydrated within his fluid/diet restriction guidelines (no more than 2L/day of fluid intake and no more than 3g of Na per day in dietary intake).     - Interrogation showed 2 low flow alarms in the past 24 hrs before epistaxis (has had at least 6 low flow alarms over the past 5 days, has hx of hemorrhoidal bleeding x1 and soft stools)              > continue with IV fluids   - Volume status: Euvolemic              > Diuretics: Not needed at this time  - Afterload reduction: MAP goal 65-80   >  Continue Hydralazine 37.5 TID    >  Continue Lisinopril 2.5 qday  - Statin: Atorvastatin 40mg  - BB: Holding in setting of recent cardiogenic shock and orthostatic symptoms, consider restarting outpatient  - AA: Holding in setting of recent cardiogenic shock and orthostatic symptoms, consider restarting outpatient  - SGLTi: As outpatient, has been held due to immunosuppression he is on for his SLE.  - Anticoagulation: Continue PTA Warfarin   - Antiplatelets: Holding PTA ASA 81mg qday given hx of epistaxis  -       #Epistaxis, improved  # Hx Intubated due to Concern for airway protection (8/7)-Extubated (8/8)  # Mild-moderate emphysema  # History of COVID-19  # Iatrogenic R apical PTX  H/o nasal perforation  For elective procedure (in [place]), postponed 2/2 LVAD insertion. Epistaxis not fixed by packing overnight on 8/6 and pt continued to bleed. Intubated for airway protection. Controlled at bedside by ENT s/p cautery and packing. Extubated 8/8. Had Cefepime/Vanc empirically for broad coverage for PNA (8/7-8/12). Patient removed his own nasal packing overnight on 8/9. Now admitted for profuse bleeding 1h after taking warfarin. ENT performed nasal package ~2h.  - Continue nasal saline   - Follow up with Dr Vazquez (EN) in 2 weeks       #GERD  #Hx of Hematemesis / oral mucosal bleeding   #Dysphagia  GI consulted during a prior admission 7/31 for black tarry stools and a possible GI bleed, however it is more likely swallowed blood from epistaxis that patient previously had. GI did not recommend an upper endoscopy. Recurrence of bloody stools likely due to epistaxis which have resolved. Hemoglobin was monitored and continued to be stable throughout the rest of the hospitalization. Cleared for regular diet by SLP.  - Regular diet   - Remove fluid restriction     #Anemia due to blood loss from Epistaxis, stable  #History of DVT and PE   #Antiphospholipid antibody syndrome  #History of iron deficiency anemia  Hb stable 11.1  - Transfuse if Hb<7 or actively bleeding    #SLE  - continue home hydroxychloroquine and MMF   - Reached out to Pharmacist regarding MMF doses. Patient has been in 1500mg BID since October 2021, there is actually a note form Rheumatology from 07/20/22 recommending this doses.     Diet:  Na 2g diet  DVT Prophylaxis: Warfarin   Fitzgerald Catheter: Not present  Code Status: Full Code  Lines: PIV     Disposition Plan   Expected discharge: 1-2 days, recommended to prior living arrangement once fluid volume status optimized on oral medication.    The patient's care was discussed with the Attending Physician, Dr. Lora.    Kristie Bowles  Internal Medicine PGY-2  ________________________________________________  Chief Complaint   Diarrheas    Interval Progress  Had some LVAD alarms overnight for low flow and more episodes of diarrheas. Per patient, bowel movements had been more running than before but lightheadedness feel the same when getting up. On my examination, low flow alarms when standing up at least twice which is new for him. Denies abdominal pain, bloating, abdominal tenderness, constipation, back pain, urinary symptoms, fever, chest pain, palpitations, SOB.     Review of Systems    The 10 point Review of Systems is  negative other than noted in the HPI or here.   Prior to Admission Medications   Prior to Admission Medications   Prescriptions Last Dose Informant Patient Reported? Taking?   Melatonin 10 MG TABS tablet   No No   Sig: Take 1 tablet (10 mg) by mouth At Bedtime for 30 days   QUEtiapine (SEROQUEL) 25 MG tablet   No No   Sig: Take 1 tablet (25 mg) by mouth 2 times daily for 30 days   QUEtiapine (SEROQUEL) 50 MG tablet   No No   Sig: Take 3 tablets (150 mg) by mouth At Bedtime for 30 days   acetaminophen (TYLENOL) 325 MG tablet   No No   Sig: Take 3 tablets (975 mg) by mouth every 8 hours for 30 days   atorvastatin (LIPITOR) 40 MG tablet   No No   Sig: Take 1 tablet (40 mg) by mouth every evening for 30 days   digoxin (LANOXIN) 125 MCG tablet   No No   Sig: Take 1 tablet (125 mcg) by mouth daily for 30 days   fluticasone-vilanterol (BREO ELLIPTA) 100-25 MCG/INH inhaler   No No   Sig: Inhale 1 puff into the lungs daily for 30 days   hydrALAZINE (APRESOLINE) 50 MG tablet   No No   Sig: Take 1 tablet (50 mg) by mouth 3 times daily for 30 days   hydrOXYzine (ATARAX) 25 MG tablet   No No   Sig: Take 1 tablet (25 mg) by mouth every 6 hours as needed for anxiety (ANXIETY)   hydrocortisone 1 % CREA cream   No No   Sig: Place rectally 3 times daily for 2 days   hydroxychloroquine (PLAQUENIL) 200 MG tablet   Yes No   Sig: Take 200 mg by mouth 2 times daily   lisinopril (ZESTRIL) 10 MG tablet   No No   Sig: Take 1 tablet (10 mg) by mouth daily for 30 days   melatonin 3 MG tablet   Yes No   Sig: Take 6 mg by mouth nightly as needed for sleep   multivitamin w/minerals (THERA-VIT-M) tablet   No No   Sig: Take 1 tablet by mouth daily   mycophenolate (GENERIC EQUIVALENT) 500 MG tablet   No No   Sig: Take 3 tablets (1,500 mg) by mouth 2 times daily for 30 days   pantoprazole (PROTONIX) 40 MG EC tablet   No No   Sig: Take 1 tablet (40 mg) by mouth daily   pramox-pe-glycerin-petrolatum (PREPARATION H) 1-0.25-14.4-15 % CREA cream   No No    Sig: Place rectally 2 times daily as needed for hemorrhoids Apply immediately after a bowel movement.   promethazine (PHENERGAN) 12.5 MG tablet   No No   Sig: Take 1 tablet (12.5 mg) by mouth every 6 hours as needed for nausea or vomiting   sertraline (ZOLOFT) 100 MG tablet   No No   Sig: Take 1 tablet (100 mg) by mouth daily for 30 days   thiamine (B-1) 100 MG tablet   No No   Sig: Take 1 tablet (100 mg) by mouth daily   warfarin ANTICOAGULANT (COUMADIN) 2.5 MG tablet   No No   Sig: Take 2 tabs (5mg) daily at bedtime. Future dose adjustments pending INR   zolpidem (AMBIEN) 5 MG tablet   Yes No   Sig: Take 5 mg by mouth nightly as needed for sleep      Facility-Administered Medications: None     Allergies   No Known Allergies    Physical Exam   Vital Signs: Temp: 97.7  F (36.5  C) Temp src: Temporal BP: 100/84 Pulse: 92   Resp: 21 SpO2: 100 % O2 Device: None (Room air)    Weight: 130 lbs 0 oz    Constitutional: awake, alert, dry mucous membranes  Hematologic / Lymphatic: no cervical lymphadenopathy and no supraclavicular lymphadenopathy, neck supples  Respiratory: No increased work of breathing, good air exchange, clear to ausculation bilaterally  Cardiovascular: LVAD hum   Abdomen: soft nt nd, no guarding, no pain to deep palpation, normal bowel sounds  Musculoskeletal: Normal capillary refill, warm to touch extremities, capillary refill<2sec  Neurologic: Awake, alert, oriented to name, place and time.  Cranial nerves II-XII are grossly intact.  No focal deficit.     Data   Data reviewed today: I reviewed all medications, new labs and imaging results over the last 24 hours. I personally reviewed Cardiology specific data review: the EKG tracing showing no ischemic ST/T changes. Sinus tachycardia     Recent Labs   Lab 09/07/22  0549 09/06/22  1333 09/05/22  0617 09/04/22  2333 09/04/22  2234 09/04/22  0601 09/01/22  0606   WBC 5.5 7.5 7.0  --  6.7  --   --    HGB 9.3* 10.4* 9.4*  --  11.1* 11.1*  --    MCV 93 93 98   --  92  --   --     308 235  --  307  --   --    INR  --  2.49*  --  2.64*  --  2.69*  --     138 135*  --  137  --   --    POTASSIUM 3.8 4.1 4.1  --  4.2  --    < >   CHLORIDE 108* 106 105  --  106  --    < >   CO2 18* 21* 18*  --  16*  --    < >   BUN 11.3 15.0 24.2*  --  17.6  --    < >   CR 0.57* 0.79 0.50*  --  0.59*  --   --    ANIONGAP 11 11 12  --  15  --    < >   ELDA 9.0 9.8 9.1  --  9.5  --   --    GLC 83 112* 97  --  112*  --    < >   ALBUMIN  --  4.1  --   --  3.8  --   --    PROTTOTAL  --  7.7  --   --  7.7  --   --    BILITOTAL  --  0.4  --   --  0.4  --   --    ALKPHOS  --  136*  --   --  133*  --   --    ALT  --  20  --   --  15  --   --    AST  --  30  --   --  42  --   --     < > = values in this interval not displayed.     Most Recent 3 CBC's:  Recent Labs   Lab Test 09/07/22 0549 09/06/22 1333 09/05/22 0617   WBC 5.5 7.5 7.0   HGB 9.3* 10.4* 9.4*   MCV 93 93 98    308 235     Most Recent 3 BMP's:  Recent Labs   Lab Test 09/07/22 0549 09/06/22 1333 09/05/22  0617    138 135*   POTASSIUM 3.8 4.1 4.1   CHLORIDE 108* 106 105   CO2 18* 21* 18*   BUN 11.3 15.0 24.2*   CR 0.57* 0.79 0.50*   ANIONGAP 11 11 12   ELDA 9.0 9.8 9.1   GLC 83 112* 97     Most Recent 2 LFT's:  Recent Labs   Lab Test 09/06/22 1333 09/04/22  2234   AST 30 42   ALT 20 15   ALKPHOS 136* 133*   BILITOTAL 0.4 0.4     Most Recent 3 INR's:  Recent Labs   Lab Test 09/06/22  1333 09/04/22  2333 09/04/22  0601   INR 2.49* 2.64* 2.69*     Anticoagulation Dose History     Recent Dosing and Labs Latest Ref Rng & Units 8/31/2022 9/1/2022 9/2/2022 9/3/2022 9/4/2022 9/4/2022 9/6/2022    Warfarin 3 mg - 6 mg - - - - - -    Warfarin 5 mg - - - 5 mg - - - -    Warfarin 7.5 mg - - 7.5 mg - 7.5 mg - - -    [id number] 70 - 130 % - - - - - - -    INR 0.85 - 1.15 2.53(H) 2.25(H) 2.56(H) 2.64(H) 2.69(H) 2.64(H) 2.49(H)          Most Recent 3 Troponin's:No lab results found.  Most Recent 3 BNP's:  Recent Labs   Lab Test  22  2234 22  1746 22  2215   NTBNPI 928* 4,611* 2,006*     Most Recent D-dimer:  Recent Labs   Lab Test 22  1333   DD 1.78*     Most Recent Cholesterol Panel:  Recent Labs   Lab Test 22  0331 22  0337 22  0334   CHOL  --   --  79   LDL  --   --  38   HDL  --   --  30*   TRIG 110   < > 53    < > = values in this interval not displayed.     Most Recent Hemoglobin A1c:  Recent Labs   Lab Test 22  0516   A1C 5.5     Most Recent 6 glucoses:  Recent Labs   Lab Test 22  0549 22  1333 22  0617 22  22322  0606 22  0804   GLC 83 112* 97 112* 94 104*     Most Recent Urinalysis:  Recent Labs   Lab Test 22  1542   COLOR Yellow   APPEARANCE Clear   URINEGLC Negative   URINEBILI Negative   URINEKETONE Negative   SG 1.014   UBLD Negative   URINEPH 5.5   PROTEIN 30 *   NITRITE Negative   LEUKEST Negative   RBCU <1   WBCU 4     Most Recent ESR & CRP:  Recent Labs   Lab Test 224 22  0606 22  1522   SED  --   --  39*   CRP 13.50*   < > 28.0*    < > = values in this interval not displayed.     Most Recent Anemia Panel:  Recent Labs   Lab Test 22  0549 22  1711 22  1005   WBC 5.5   < >  --    HGB 9.3*   < >  --    HCT 29.8*   < >  --    MCV 93   < >  --       < >  --    IRON  --   --  42*   IRONSAT  --   --  16   FEB  --   --  260   SHAWN  --   --  365    < > = values in this interval not displayed.     Recent Results (from the past 24 hour(s))   Echocardiogram Complete   Result Value    LVEF  10-15% (severely reduced)    [facility]    [id number]  [id number]  [id number]  [id number]^ZELDA^ADRIÁN^MARCUS     [facility],[place]  Echocardiography Laboratory  [address] [place] [id number]     Name: OLEG KAUR  MRN: [id number]  : 1960  Study Date: 2022 01:44 PM  Age: 61 yrs  Gender: Male  Patient Location: [facility]  Reason For Study: Chronic systolic congestive heart  failure (H), LVAD (left  ventri  Ordering Physician: ADRIÁN NDIAYE  Referring Physician: ADRIÁN NDIAYE  Performed By: Chantal Malin     BSA: 1.7 m2  Height: 67 in  Weight: 130 lb  ______________________________________________________________________________  ______________________________________________________________________________  Interpretation Summary  HM3 at 5200 rpm.  At baseline -  Left ventricular function is decreased. The ejection fraction is 10-15%  (severely reduced). Septum is midline.  LVAD cannula was seen in the expected anatomic position in the LV apex.  Mild right ventricular dilation is present. Global right ventricular function  is mildly reduced.  The AV remains closed throughout the cardiac cycle. There is mild, continuous  aortic regurgitation.  The inferior vena cava was normal in size with preserved respiratory  variability.  No pericardial effusion is present.     Please refer to the EPIC report for measurements performed at different LVAD  speed settings.  ______________________________________________________________________________  Left Ventricle  Left ventricular function is decreased. The ejection fraction is 10-15%  (severely reduced). Please refer to the EPIC report for measurements performed  at different LVAD speed settings. LVAD cannula was seen in the expected  anatomic position in the LV apex. Septum is midline.     Right Ventricle  Mild right ventricular dilation is present. Global right ventricular function  is mildly reduced.     Aortic Valve  The AV remains closed throughout the cardiac cycle. There is mild, continuous  aortic regurgitation.     Tricuspid Valve  Mild tricuspid insufficiency is present.     Vessels  The inferior vena cava was normal in size with preserved respiratory  variability.     Pericardium  No pericardial effusion is present.  ______________________________________________________________________________  MMode/2D Measurements &  Calculations  IVSd: 0.88 cm  LVIDd: 3.9 cm  LVIDs: 3.7 cm  LVPWd: 0.99 cm  FS: 3.1 %  LV mass(C)d: 109.2 grams  LV mass(C)dI: 64.9 grams/m2  RWT: 0.52     ______________________________________________________________________________  Report approved by: Nakia Gaines 09/06/2022 02:57 PM

## 2022-09-14 NOTE — PHARMACY-ADMISSION MEDICATION HISTORY
Admission Medication History Completed by Pharmacy    See [facility] Admission Navigator for allergy information, preferred outpatient pharmacy, prior to admission medications and immunization status.     Medication History Sources:     9/4/22 ARU discharge summary, [facility] fill history    Changes made to PTA medication list (reason):    Added: Aspirin, saline nasal topical gel, sodium chloride nasal spray, ferrous sulfate, tamsulosin    Deleted: Duplicate melatonin (3 mg entry)    Changed: None    Additional Information:    Med scribe attempted medication history interview with patient on admission on 9/6/22, but patient was unaware of his medications. Completed medication history using 9/4/22 ARU discharge medication list.    Unsure if patient taking ferrous sulfate and tamsulosin. Does not appear that patient was getting these during ARU admission. Last fill of ferrous sulfate was 4/29/22 for a 30 day supply. Last fill of tamsulosin was 5/4/22 for a 30 day supply.    Regarding mycophenolate - Provider specifically requested clarification of dose of this in med history consult giving on going diarrhea. Based on what patient was getting at ARU, review of past rheumatology notes, and fill history, appears 1500 mg BID is the correct dose.    Prior to Admission medications    Medication Sig Last Dose Taking? Auth Provider Long Term End Date   acetaminophen (TYLENOL) 325 MG tablet Take 3 tablets (975 mg) by mouth every 8 hours for 30 days  Yes Abner Mathew MD  10/1/22   aspirin (ASA) 81 MG chewable tablet Take 81 mg by mouth daily  Yes Unknown, Entered By History     atorvastatin (LIPITOR) 40 MG tablet Take 1 tablet (40 mg) by mouth every evening for 30 days  Yes Abner Mathew MD Yes 10/1/22   digoxin (LANOXIN) 125 MCG tablet Take 1 tablet (125 mcg) by mouth daily for 30 days  Yes Abner Mathew MD Yes 10/1/22   fluticasone-vilanterol (BREO ELLIPTA) 100-25 MCG/INH inhaler Inhale 1 puff into  the lungs daily for 30 days  Yes Abner Mathew MD  10/2/22   hydrALAZINE (APRESOLINE) 50 MG tablet Take 1 tablet (50 mg) by mouth 3 times daily for 30 days  Yes Abner Mathew MD Yes 10/2/22   hydrocortisone 1 % CREA cream Place rectally 3 times daily  Yes Keily Bowers MD     hydroxychloroquine (PLAQUENIL) 200 MG tablet Take 200 mg by mouth 2 times daily  Yes Unknown, Entered By History     hydrOXYzine (ATARAX) 25 MG tablet Take 1 tablet (25 mg) by mouth every 6 hours as needed for anxiety (ANXIETY)  Yes Gisell Barnes MD  10/4/22   lisinopril (ZESTRIL) 10 MG tablet Take 1 tablet (10 mg) by mouth daily for 30 days  Yes Abner Mathew MD Yes 10/2/22   Melatonin 10 MG TABS tablet Take 1 tablet (10 mg) by mouth At Bedtime for 30 days  Yes Gisell Barnes MD  10/4/22   multivitamin w/minerals (THERA-VIT-M) tablet Take 1 tablet by mouth daily  Yes Spencer Mehta MD     mycophenolate (GENERIC EQUIVALENT) 500 MG tablet Take 3 tablets (1,500 mg) by mouth 2 times daily for 30 days  Yes Abner Mathew MD Yes 10/1/22   pantoprazole (PROTONIX) 40 MG EC tablet Take 1 tablet (40 mg) by mouth daily  Yes Gisell Barnes MD     pramox-pe-glycerin-petrolatum (PREPARATION H) 1-0.25-14.4-15 % CREA cream Place rectally 2 times daily as needed for hemorrhoids Apply immediately after a bowel movement.  Yes Gisell Barnes MD     promethazine (PHENERGAN) 12.5 MG tablet Take 1 tablet (12.5 mg) by mouth every 6 hours as needed for nausea or vomiting  Yes Gisell Barnes MD     QUEtiapine (SEROQUEL) 25 MG tablet Take 1 tablet (25 mg) by mouth 2 times daily for 30 days  Yes Abner Mathew MD Yes 10/1/22   QUEtiapine (SEROQUEL) 50 MG tablet Take 3 tablets (150 mg) by mouth At Bedtime for 30 days  Yes Abner Mathew MD Yes 10/1/22   sertraline (ZOLOFT) 100 MG tablet Take 1 tablet (100 mg) by mouth daily for 30 days  Yes Abner Mathew MD Yes 10/1/22   thiamine (B-1) 100 MG  tablet Take 1 tablet (100 mg) by mouth daily  Yes Laila España PA-C     vancomycin (VANCOCIN) 125 MG capsule Take 1 capsule (125 mg) by mouth 4 times daily  Yes Keily Bowers MD     warfarin ANTICOAGULANT (COUMADIN) 2.5 MG tablet Take 2 tabs (5mg) daily at bedtime. Future dose adjustments pending INR  Yes Ceferino Thomas MD     zolpidem (AMBIEN) 5 MG tablet Take 5 mg by mouth nightly as needed for sleep  Yes Unknown, Entered By History     ferrous sulfate (FEROSUL) 325 (65 Fe) MG tablet Take 325 mg by mouth daily (with breakfast)   Unknown, Entered By History     saline nasal (AYR SALINE) GEL topical gel Apply into each nare At Bedtime   Unknown, Entered By History     sodium chloride (OCEAN) 0.65 % nasal spray Spray 2 sprays into both nostrils every 4 hours (while awake)   Unknown, Entered By History     tamsulosin (FLOMAX) 0.4 MG capsule Take 0.4 mg by mouth daily   Unknown, Entered By History     clopidogrel (PLAVIX) 75 MG tablet Take 1 tablet (75 mg) by mouth daily   Duncan Luciano MD Yes 8/21/22   DULoxetine (CYMBALTA) 30 MG capsule Take 1 capsule (30 mg) by mouth daily   Laila España PA-C Yes 8/21/22   furosemide (LASIX) 40 MG tablet Take 1 tablet (40 mg) by mouth daily   Duncan Luciano MD Yes 8/21/22   ticagrelor (BRILINTA) 90 MG tablet Take 1 tablet (90 mg) by mouth 2 times daily   Laila España PA-C Yes 5/30/22       Date completed: 09/14/22    Medication history completed by: Shante Grant [facility]

## 2022-09-14 NOTE — PLAN OF CARE
Neuro: A&Ox4. Numbness and tingling in r hand which is baseline.  Cardiac: Heartmate LVAD. SR with BBB.   Respiratory: Sating >95 on RA  GI/: Adequate urine output. No BM this shift.  Diet/appetite: Regular diet. Good appetite. Takes ice cream before bed.  Activity:  Stand-by-assist. Able to reposition self.  Pain: At acceptable level.   Skin: No new deficits noted. Healing incision on midline.  LDA's: L PIV SL    Low flow alarm with activity this morning. Cards MD notified. MD aware of issue. Pt asymptomatic, doppler MAP unchanged from baseline.    Plan: Continue with POC. Notify primary team with changes.

## 2022-09-15 VITALS
HEART RATE: 87 BPM | RESPIRATION RATE: 17 BRPM | BODY MASS INDEX: 21.18 KG/M2 | HEIGHT: 67 IN | SYSTOLIC BLOOD PRESSURE: 100 MMHG | DIASTOLIC BLOOD PRESSURE: 87 MMHG | OXYGEN SATURATION: 100 % | TEMPERATURE: 97.4 F | WEIGHT: 134.92 LBS

## 2022-09-15 DIAGNOSIS — M32.9 SYSTEMIC LUPUS ERYTHEMATOSUS (H): Primary | ICD-10-CM

## 2022-09-15 DIAGNOSIS — I50.23 ACUTE ON CHRONIC SYSTOLIC HEART FAILURE (H): ICD-10-CM

## 2022-09-15 LAB
ERYTHROCYTE [DISTWIDTH] IN BLOOD BY AUTOMATED COUNT: 17.2 % (ref 10–15)
HCT VFR BLD AUTO: 29.6 % (ref 40–53)
HGB BLD-MCNC: 9 G/DL (ref 13.3–17.7)
INR PPP: 2.3 (ref 0.85–1.15)
MAGNESIUM SERPL-MCNC: 2 MG/DL (ref 1.7–2.3)
MCH RBC QN AUTO: 29.3 PG (ref 26.5–33)
MCHC RBC AUTO-ENTMCNC: 30.4 G/DL (ref 31.5–36.5)
MCV RBC AUTO: 96 FL (ref 78–100)
PLATELET # BLD AUTO: 212 10E3/UL (ref 150–450)
POTASSIUM SERPL-SCNC: 4.1 MMOL/L (ref 3.4–5.3)
RBC # BLD AUTO: 3.07 10E6/UL (ref 4.4–5.9)
WBC # BLD AUTO: 4.6 10E3/UL (ref 4–11)

## 2022-09-15 PROCEDURE — 84132 ASSAY OF SERUM POTASSIUM: CPT | Performed by: INTERNAL MEDICINE

## 2022-09-15 PROCEDURE — 99239 HOSP IP/OBS DSCHRG MGMT >30: CPT | Mod: GC | Performed by: INTERNAL MEDICINE

## 2022-09-15 PROCEDURE — 250N000013 HC RX MED GY IP 250 OP 250 PS 637: Performed by: INTERNAL MEDICINE

## 2022-09-15 PROCEDURE — 250N000013 HC RX MED GY IP 250 OP 250 PS 637

## 2022-09-15 PROCEDURE — 83735 ASSAY OF MAGNESIUM: CPT | Performed by: INTERNAL MEDICINE

## 2022-09-15 PROCEDURE — 85610 PROTHROMBIN TIME: CPT | Performed by: INTERNAL MEDICINE

## 2022-09-15 PROCEDURE — 85027 COMPLETE CBC AUTOMATED: CPT

## 2022-09-15 PROCEDURE — 250N000012 HC RX MED GY IP 250 OP 636 PS 637

## 2022-09-15 PROCEDURE — 36592 COLLECT BLOOD FROM PICC: CPT | Performed by: INTERNAL MEDICINE

## 2022-09-15 RX ORDER — VANCOMYCIN HYDROCHLORIDE 125 MG/1
125 CAPSULE ORAL 4 TIMES DAILY
Qty: 28 CAPSULE | Refills: 0 | Status: ON HOLD | OUTPATIENT
Start: 2022-09-15 | End: 2022-09-29

## 2022-09-15 RX ORDER — LANOLIN ALCOHOL/MO/W.PET/CERES
100 CREAM (GRAM) TOPICAL DAILY
Qty: 30 TABLET | Refills: 0 | Status: SHIPPED | OUTPATIENT
Start: 2022-09-15 | End: 2022-10-27

## 2022-09-15 RX ORDER — WARFARIN SODIUM 7.5 MG/1
7.5 TABLET ORAL
Status: DISCONTINUED | OUTPATIENT
Start: 2022-09-15 | End: 2022-09-15 | Stop reason: HOSPADM

## 2022-09-15 RX ORDER — HYDROXYCHLOROQUINE SULFATE 200 MG/1
200 TABLET, FILM COATED ORAL 2 TIMES DAILY
Qty: 60 TABLET | Refills: 0 | Status: ON HOLD | OUTPATIENT
Start: 2022-09-15 | End: 2022-10-17

## 2022-09-15 RX ADMIN — DIGOXIN 125 MCG: 125 TABLET ORAL at 08:03

## 2022-09-15 RX ADMIN — ACETAMINOPHEN 975 MG: 325 TABLET, FILM COATED ORAL at 04:48

## 2022-09-15 RX ADMIN — HYDRALAZINE HYDROCHLORIDE 50 MG: 50 TABLET, FILM COATED ORAL at 13:51

## 2022-09-15 RX ADMIN — LISINOPRIL 10 MG: 5 TABLET ORAL at 08:03

## 2022-09-15 RX ADMIN — PANTOPRAZOLE SODIUM 40 MG: 40 TABLET, DELAYED RELEASE ORAL at 08:02

## 2022-09-15 RX ADMIN — MYCOPHENOLATE MOFETIL 1500 MG: 500 TABLET, FILM COATED ORAL at 08:02

## 2022-09-15 RX ADMIN — HYDRALAZINE HYDROCHLORIDE 50 MG: 50 TABLET, FILM COATED ORAL at 08:03

## 2022-09-15 RX ADMIN — ACETAMINOPHEN 975 MG: 325 TABLET, FILM COATED ORAL at 11:17

## 2022-09-15 RX ADMIN — VANCOMYCIN HYDROCHLORIDE 125 MG: 125 CAPSULE ORAL at 11:17

## 2022-09-15 RX ADMIN — QUETIAPINE 25 MG: 25 TABLET ORAL at 08:02

## 2022-09-15 RX ADMIN — VANCOMYCIN HYDROCHLORIDE 125 MG: 125 CAPSULE ORAL at 08:03

## 2022-09-15 RX ADMIN — THIAMINE HCL TAB 100 MG 100 MG: 100 TAB at 08:03

## 2022-09-15 RX ADMIN — HYDROXYCHLOROQUINE SULFATE 200 MG: 200 TABLET, FILM COATED ORAL at 08:03

## 2022-09-15 RX ADMIN — Medication 1 PACKET: at 10:13

## 2022-09-15 RX ADMIN — Medication 1 TABLET: at 08:02

## 2022-09-15 RX ADMIN — SERTRALINE HYDROCHLORIDE 100 MG: 100 TABLET ORAL at 08:03

## 2022-09-15 RX ADMIN — QUETIAPINE 25 MG: 25 TABLET ORAL at 13:51

## 2022-09-15 ASSESSMENT — ACTIVITIES OF DAILY LIVING (ADL)
ADLS_ACUITY_SCORE: 36

## 2022-09-15 NOTE — PLAN OF CARE
Neuro: A&Ox4. Baseline N/T in R hand.   Cardiac: SR with a BBB. VSS. HM3 LVAD- flow and PI drop with activity but no alarms this shift. Pt denies dizziness this shift.    Respiratory: Sating > 94% on RA.  GI/: Adequate urine output via urinal. No BM this shift. Denies nausea.   Diet/appetite: Tolerating regular diet. Eating well.  Activity: Standby assist, up at bedside.   Pain: At acceptable level on current regimen.   Skin: No new deficits noted.  LDA's: R Midline.     Plan: Continue with POC. Notify primary team with changes.

## 2022-09-15 NOTE — DISCHARGE SUMMARY
"  [facility]   Cardiology II Service / Advanced Heart Failure  Discharge Summary     Dandy Sands MRN# [id number]   YOB: 1960 Age: 61 year old     DATE OF ADMISSION:  9/6/2022  DATE OF DISCHARGE: 9/15/2022  ADMITTING PROVIDER: Yoli Askew MD  DISCHARGE PROVIDER: Jackie Lora MD  PRIMARY PROVIDER: Scar Jeffrey    ADMIT DIAGNOSES:   1. Stage D HFrEF 2/2 ICM s/p heart mate 3 LVAD 7/8/22   2. Low flow events   3. History of CAD s/p PCI to LAD 2005   4. S/p CRT-D 2006  5. Mild-moderate emphysema   6. History of COVID-19   7. Deconditioning   8. GERD   9. Dysphagia   10. Anemia due to blood loss from epistaxis   11. History of DVT/PE   12. Antiphospholipid syndrome   13. SLE     DISCHARGE DIAGNOSES:   1. Acute non-severe clostridium difficile infection  2. Stage D HFrEF 2/2 ICM s/p heart mate 3 LVAD 7/8/22   3. Low flow events   4. History of CAD s/p PCI to LAD 2005   5. S/p CRT-D 2006  6. Mild-moderate emphysema   7. History of COVID-19   8. Deconditioning   9. GERD   10. Dysphagia   11. Anemia due to blood loss from epistaxis   12. History of DVT/PE   13. Antiphospholipid syndrome   14. SLE     HPI: Please see the detailed H & P by Dr. Carrillo Bowles from 9/6/2022. Briefly, Dnady Sands was admitted with recurrent low flow alarms on his LVAD in the setting of acute diarrheal illness and volume depletion. Found to have c diff on admission.     PHYSICAL EXAM:  Blood pressure 100/87, pulse 87, temperature 97.4  F (36.3  C), temperature source Oral, resp. rate 17, height 1.702 m (5' 7\"), weight 61.2 kg (134 lb 14.7 oz), SpO2 100 %.  Constitutional: awake, alert, dry mucous membranes  Respiratory: No increased work of breathing, good air exchange, clear to ausculation bilaterally  Cardiovascular: LVAD hum   Abdomen: soft nt nd, no guarding, no tenderness   Musculoskeletal: warm to touch extremities   Neurologic: Awake, alert, oriented to name, place and time.  Cranial nerves II-XII " are grossly intact.  No focal deficit.      LABS:   Last CBC:   Recent Labs   Lab Test 09/15/22  0603   WBC 4.6   RBC 3.07*   HGB 9.0*   HCT 29.6*   MCV 96   MCH 29.3   MCHC 30.4*   RDW 17.2*          Last CMP:  Recent Labs   Lab Test 09/15/22  0603 09/14/22  0450 09/07/22  0549 09/06/22  1333   NA  --  142   < > 138   POTASSIUM 4.1 4.4  4.2   < > 4.1   CHLORIDE  --  107   < > 106   ELDA  --  9.3   < > 9.8   CO2  --  24   < > 21*   BUN  --  11.1   < > 15.0   CR  --  0.62*   < > 0.79   GLC  --  88   < > 112*   AST  --   --   --  30   ALT  --   --   --  20   BILITOTAL  --   --   --  0.4   ALBUMIN  --   --   --  4.1   PROTTOTAL  --   --   --  7.7   ALKPHOS  --   --   --  136*    < > = values in this interval not displayed.       IMAGING:  Results for orders placed or performed during the hospital encounter of 09/06/22   XR Chest Port 1 View    Narrative    EXAM: XR Chest 1 view 9/10/2022 10:37 AM      HISTORY: to rule out PNA.    COMPARISON: Radiograph 9/5/2022.     TECHNIQUE: Frontal view of the chest.    FINDINGS: Left chest wall ICD in stable position. LVAD. Post surgical  changes of the chest and median sternotomy wires intact. Trachea is  midline. Cardiac mediastinal silhouette is stable. Increased left  basilar pulmonary opacity. Trace bilateral pleural effusions. No  pneumothoraces.      Impression    IMPRESSION:     1. Increased left basilar pulmonary opacity, which may represent  atelectasis, pulmonary edema, or infection.  2. Trace bilateral pleural effusions.    I have personally reviewed the examination and initial interpretation  and I agree with the findings.    JILL CARRANZA MD         SYSTEM ID:  [id number]       PROCEDURES:   None     CONSULTATIONS:   Infectious diseases     HOSPITAL COURSE BY PROBLEM:     # Clostridium difficile infection   # Low flow events  # Moderate dehydration  Presented with diarrhea and low flow alarms. Found to have positive c diff toxin by PCR on admission. During  admission he was started on oral vancomycin and given IV fluids for volume repletion. Was feeling better by 9/15 with no low-flow alarms over a 24 hour period.   -continue vancomycin for a total of 14 days through 9/21/22 (still having some loose stools on day of discharge, hence slightly longer course of oral vancomycin)       #HFrEF 2/2 ICM s/p HM3 LVAD 7/8/2022  #RV failure s/p Protek duo temporary RVAD s/p decannulation 7/21/22  #Post chest closure hemopericardium s/p repeat washout (7/12/22)  #PMH CAD s/p PCI to LAD (2005)  #S/p CRT-D (2006)  - Volume status: Euvolemic              > Diuretics: Not needed at this time  - Afterload reduction: MAP goal 65-80; Hydralazine 50 TID + Lisinopril 10 qday  - Statin: Atorvastatin 40mg  - BB: Holding in setting of recent cardiogenic shock and orthostatic symptoms, consider restarting outpatient  - AA: Holding in setting of recent cardiogenic shock and orthostatic symptoms, consider restarting outpatient  - SGLTi: As outpatient, has been held due to immunosuppression he is on for his SLE.  - Anticoagulation: resume warfarin at 5 mg daily (INR goal 2-3 for now)   - Antiplatelets: discontinued ASA 81mg qday indefinitely due to recurrent nosebleeds   - continue digoxin 125 mcg daily      #APS   #SLE  - continue home plaquenil, warfarin, mycophenolate      #Epistaxis, improved  # Hx Intubated due to Concern for airway protection (8/7)-Extubated (8/8)  # Mild-moderate emphysema  # History of COVID-19  # Iatrogenic R apical PTX  H/o nasal perforation  For elective procedure (in [place]), postponed 2/2 LVAD insertion. Epistaxis not fixed by packing overnight on 8/6 and pt continued to bleed. Intubated for airway protection. Controlled at bedside by ENT s/p cautery and packing. Extubated 8/8. Had Cefepime/Vanc empirically for broad coverage for PNA (8/7-8/12). Patient removed his own nasal packing overnight on 8/9. Now admitted for profuse bleeding 1h after taking warfarin.  ENT performed nasal package ~2h.  - Continue nasal saline   - Follow up with Dr Vazquez (ENT) on 10/7/22       #GERD  #Hx of Hematemesis / oral mucosal bleeding   #Dysphagia  GI consulted during a prior admission 7/31 for black tarry stools and a possible GI bleed, however it is more likely swallowed blood from epistaxis that patient previously had. GI did not recommend an upper endoscopy. Recurrence of bloody stools likely due to epistaxis which have resolved. Hemoglobin was monitored and continued to be stable throughout the rest of the hospitalization. Cleared for regular diet by SLP.  - Regular diet      PENDING RESULTS:   None     DISCHARGE MEDICATIONS:  Current Discharge Medication List      START taking these medications    Details   vancomycin (VANCOCIN) 125 MG capsule Take 1 capsule (125 mg) by mouth 4 times daily  Qty: 28 capsule, Refills: 0    Associated Diagnoses: Acute diarrhea         CONTINUE these medications which have CHANGED    Details   hydrocortisone 1 % CREA cream Place rectally 3 times daily  Qty: 28 g, Refills: 0    Associated Diagnoses: External hemorrhoids; LVAD (left ventricular assist device) present (H); Heart failure with reduced ejection fraction, NYHA class III (H)         CONTINUE these medications which have NOT CHANGED    Details   acetaminophen (TYLENOL) 325 MG tablet Take 3 tablets (975 mg) by mouth every 8 hours for 30 days  Qty: 270 tablet, Refills: 0    Associated Diagnoses: LVAD (left ventricular assist device) present (H)      atorvastatin (LIPITOR) 40 MG tablet Take 1 tablet (40 mg) by mouth every evening for 30 days  Qty: 30 tablet, Refills: 0    Associated Diagnoses: Decompensated heart failure (H)      digoxin (LANOXIN) 125 MCG tablet Take 1 tablet (125 mcg) by mouth daily for 30 days  Qty: 30 tablet, Refills: 0    Associated Diagnoses: Heart failure with reduced ejection fraction, NYHA class III (H)      fluticasone-vilanterol (BREO ELLIPTA) 100-25 MCG/INH inhaler Inhale  1 puff into the lungs daily for 30 days  Qty: 30 each, Refills: 0    Associated Diagnoses: Nasal septal perforation      hydrALAZINE (APRESOLINE) 50 MG tablet Take 1 tablet (50 mg) by mouth 3 times daily for 30 days  Qty: 90 tablet, Refills: 0    Associated Diagnoses: LVAD (left ventricular assist device) present (H)      hydroxychloroquine (PLAQUENIL) 200 MG tablet Take 200 mg by mouth 2 times daily      hydrOXYzine (ATARAX) 25 MG tablet Take 1 tablet (25 mg) by mouth every 6 hours as needed for anxiety (ANXIETY)  Qty: 60 tablet, Refills: 0    Associated Diagnoses: Anxiety; Nausea      lisinopril (ZESTRIL) 10 MG tablet Take 1 tablet (10 mg) by mouth daily for 30 days  Qty: 30 tablet, Refills: 0    Associated Diagnoses: LVAD (left ventricular assist device) present (H)      Melatonin 10 MG TABS tablet Take 1 tablet (10 mg) by mouth At Bedtime for 30 days  Qty: 30 tablet, Refills: 0    Associated Diagnoses: Adjustment insomnia      multivitamin w/minerals (THERA-VIT-M) tablet Take 1 tablet by mouth daily  Refills: 0    Associated Diagnoses: Heart failure with reduced ejection fraction, NYHA class III (H)      mycophenolate (GENERIC EQUIVALENT) 500 MG tablet Take 3 tablets (1,500 mg) by mouth 2 times daily for 30 days  Qty: 180 tablet, Refills: 0    Associated Diagnoses: LVAD (left ventricular assist device) present (H)      pantoprazole (PROTONIX) 40 MG EC tablet Take 1 tablet (40 mg) by mouth daily  Qty: 30 tablet, Refills: 0    Associated Diagnoses: LVAD (left ventricular assist device) present (H)      pramox-pe-glycerin-petrolatum (PREPARATION H) 1-0.25-14.4-15 % CREA cream Place rectally 2 times daily as needed for hemorrhoids Apply immediately after a bowel movement.  Qty: 51 g, Refills: 0    Associated Diagnoses: Heart failure with reduced ejection fraction, NYHA class III (H); LVAD (left ventricular assist device) present (H); External hemorrhoids      promethazine (PHENERGAN) 12.5 MG tablet Take 1 tablet  (12.5 mg) by mouth every 6 hours as needed for nausea or vomiting  Qty: 30 tablet, Refills: 1    Associated Diagnoses: Anxiety      !! QUEtiapine (SEROQUEL) 25 MG tablet Take 1 tablet (25 mg) by mouth 2 times daily for 30 days  Qty: 60 tablet, Refills: 0    Associated Diagnoses: LVAD (left ventricular assist device) present (H)      !! QUEtiapine (SEROQUEL) 50 MG tablet Take 3 tablets (150 mg) by mouth At Bedtime for 30 days  Qty: 90 tablet, Refills: 0    Associated Diagnoses: LVAD (left ventricular assist device) present (H)      sertraline (ZOLOFT) 100 MG tablet Take 1 tablet (100 mg) by mouth daily for 30 days  Qty: 30 tablet, Refills: 0    Associated Diagnoses: Anxiety      thiamine (B-1) 100 MG tablet Take 1 tablet (100 mg) by mouth daily  Qty: 30 tablet, Refills: 0    Associated Diagnoses: Acute on chronic systolic heart failure (H)      warfarin ANTICOAGULANT (COUMADIN) 2.5 MG tablet Take 2 tabs (5mg) daily at bedtime. Future dose adjustments pending INR  Qty: 72 tablet, Refills: 0    Associated Diagnoses: LVAD (left ventricular assist device) present (H)      zolpidem (AMBIEN) 5 MG tablet Take 5 mg by mouth nightly as needed for sleep      saline nasal (AYR SALINE) GEL topical gel Apply into each nare At Bedtime      sodium chloride (OCEAN) 0.65 % nasal spray Spray 2 sprays into both nostrils every 4 hours (while awake)      tamsulosin (FLOMAX) 0.4 MG capsule Take 0.4 mg by mouth daily       !! - Potential duplicate medications found. Please discuss with provider.      STOP taking these medications       aspirin (ASA) 81 MG chewable tablet Comments:   Reason for Stopping:         ferrous sulfate (FEROSUL) 325 (65 Fe) MG tablet Comments:   Reason for Stopping:               DISCHARGE DISPOSITION: Dandy Sands will discharge to home in stable condition.     DISCHARGE INSTRUCTIONS:  Discharge Procedure Orders   CARDIAC REHAB REFERRAL   Standing Status: Future   Referral Priority: Routine: Next available  opening Referral Type: Rehab Therapy Cardiac Therapy   Number of Visits Requested: 1     Home Care Referral   Referral Priority: Routine: Next available opening Referral Type: Home Health Therapies & Aides   Number of Visits Requested: 1     Reason for your hospital stay   Order Comments: Mr. Sands you were hospitalized for Cdiff infection that led to dehydration and multiple LVAD alarms. You were treated with antibiotics Vancomycin.     Activity   Order Comments: Your activity upon discharge: activity as tolerated     Order Specific Question Answer Comments   Is discharge order? Yes      Follow Up and recommended labs and tests   Order Comments: Follow up with primary care provider, Scar Jeffrey, within 7 days for hospital follow- up.  The following labs/tests are recommended: CBC, INR, BMP within 1 week.     Follow up with Cardiologist Dr. Lora 09/22/22     Diet   Order Comments: Follow this diet upon discharge: Orders Placed This Encounter      Snacks/Supplements Adult: Other; 10 AM - Special K Protein bar x2; Between Meals      Room Service      Regular Diet Adult     Order Specific Question Answer Comments   Is discharge order? Yes        60 minutes spent in discharge, including >50% in counseling and coordination of care, medication review and plan of care recommended on follow up. Questions were answered.   Scar Mackey  (PCP) was contacted at the time of discharge, so as to bridge from hospital to outpatient care.     It was our pleasure to care for Dandy Sands during this hospitalization. Please do not hesitate to contact me should there be questions regarding the hospital course or discharge plan.      Ceferino Thomas MD  Cardiology Fellow  [phone]  9/15/2022

## 2022-09-15 NOTE — PROGRESS NOTES
Care Management Discharge Note    Discharge Date: 09/15/2022    Discharge Disposition: Home    Discharge Services:  Home care    Discharge DME:  None    Discharge Transportation: family or friend will provide    Private pay costs discussed: Not applicable    Education Provided on the Discharge Plan:  yes  Persons Notified of Discharge Plans: patient, provider, nurse, home care  Patient/Family in Agreement with the Plan:  yes    Handoff Referral Completed: Yes    Additional Information:  Patient discharging today. [facility] updated on discharge. Handoff complete.     [facility] [place] - RN  Ph: [phone]   Fax:  [phone]    Susan Gabriel RN, BSN  6A RN Care Coordinator  Ph: [phone]   Pager: [phone]

## 2022-09-15 NOTE — PLAN OF CARE
Goal Outcome Evaluation:    DISCHARGE                         9/15/2022  2:23 PM  ----------------------------------------------------------------------------  Discharged to: apartments  Via: private transportation  Accompanied by: Family  Discharge Instructions: diet, activity, medications, follow up appointments, when to call the MD, aftercare instructions.  Prescriptions: To be filled by [place] discharge pharmacy; medication list reviewed & sent with pt  Follow Up Appointments: arranged; information given  Belongings: All sent with pt  IV: d/c'd  Telemetry: d/c'd  Pt exhibits understanding of above discharge instructions; all questions answered.    Discharge Paperwork: Signed, copied, and sent home with patient.

## 2022-09-16 ENCOUNTER — PATIENT OUTREACH (OUTPATIENT)
Dept: CARE COORDINATION | Facility: CLINIC | Age: 62
End: 2022-09-16

## 2022-09-16 ENCOUNTER — CARE COORDINATION (OUTPATIENT)
Dept: CARDIOLOGY | Facility: CLINIC | Age: 62
End: 2022-09-16

## 2022-09-16 ENCOUNTER — TELEPHONE (OUTPATIENT)
Dept: ANTICOAGULATION | Facility: CLINIC | Age: 62
End: 2022-09-16

## 2022-09-16 ENCOUNTER — DOCUMENTATION ONLY (OUTPATIENT)
Dept: ANTICOAGULATION | Facility: CLINIC | Age: 62
End: 2022-09-16

## 2022-09-16 DIAGNOSIS — Z95.811 LVAD (LEFT VENTRICULAR ASSIST DEVICE) PRESENT (H): ICD-10-CM

## 2022-09-16 DIAGNOSIS — I50.20 HEART FAILURE WITH REDUCED EJECTION FRACTION, NYHA CLASS III (H): ICD-10-CM

## 2022-09-16 DIAGNOSIS — Z79.899 LONG TERM USE OF DRUG: ICD-10-CM

## 2022-09-16 DIAGNOSIS — I50.22 CHRONIC SYSTOLIC CONGESTIVE HEART FAILURE (H): ICD-10-CM

## 2022-09-16 DIAGNOSIS — I50.22 CHRONIC SYSTOLIC CONGESTIVE HEART FAILURE (H): Primary | ICD-10-CM

## 2022-09-16 DIAGNOSIS — Z95.811 LEFT VENTRICULAR ASSIST DEVICE PRESENT (H): ICD-10-CM

## 2022-09-16 NOTE — PROGRESS NOTES
ANTICOAGULATION  MANAGEMENT: Discharge Review    Dandy Sands chart reviewed for anticoagulation continuity of care    Hospital Admission on -9/15 for C-Diff that led to dehydration and multiple LVAD alarms.    Discharge disposition: Home with Home Care. Patient is staying local at [facility] and is getting [facility]     Results:    Recent labs: (last 7 days)     09/10/22  1106 22  1140 22  0926 22  0807 22  0450 09/15/22  0603   INR 2.10* 2.20* 2.18* 2.39* 2.31* 2.30*     Anticoagulation inpatient management:     See calendar    Anticoagulation discharge instructions:     Warfarin dosinmg daily   Bridging: No   INR goal change: No      Medication changes affecting anticoagulation: Yes: Vacomycin 125mg QID (last dose ): historically this should not effect patient's INR, but patient has C-Diff that could cause fluctuating INR's.    Additional factors affecting anticoagulation: Yes: history of nose bleeds and ASA discontinue due to this.      PLAN     No adjustment to anticoagulation plan needed    Patient not contacted    Anticoagulation Calendar updated    Riri Giron RN

## 2022-09-16 NOTE — TELEPHONE ENCOUNTER
9/16/22    Spoke to patient post discharge.  Introduced Dandy to the [facility] hours, and contact information.  Patient has been on Warfarin for 15 years.  He was instructed to take 5mg daily at discharge.  Home care will get an INR on Monday 9/19/22.    Patient denied any questions at time of encounter.    Chon Tinsley RN

## 2022-09-16 NOTE — PROGRESS NOTES
Called patient/caregiver to check in 24 post discharge. Pt reports VAD parameters WNL and weight not yet checked today. Reviewed medications and answered any questions. Patient reports sleeping well and no anxiety since being home with LVAD. Patient is able to move around the house and care for himself independently.     Discussed specific new problems/stressors since being discharged from the hospital: none at this time. Reminded of clinic appt next week. Empathized with patient and reviewed coping strategies: enlisting support from friends and love ones, attending patient and caregiver support groups, reviewing LVAD educational materials to reinforce knowledge, and talking about concerns with family/care providers/trusted others. Encouraged pt to page VAD Coordinator with any issues or questions. Pt verbalizes understanding.

## 2022-09-16 NOTE — PROGRESS NOTES
[facility]    Background: Transitional Care Management program auto-identified and prompting a chart review by [facility] team.    Assessment: Upon chart review, [facility] Team member will cancel/close this episode of Transitional Care Management program due to reason below:    Patient has active communication with a nurse, provider or care team for reason of post-hospital follow up plan.  Outreach call by [facility] team not indicated to minimize duplicative efforts.     Plan: Transitional Care Management episode closed per reason above.      JAXON Baptiste  [facility]    *Connected Care Resource Team does NOT follow patient ongoing. Referrals are identified based on internal discharge reports and the outreach is to ensure patient has an understanding of their discharge instructions.

## 2022-09-18 ENCOUNTER — CARE COORDINATION (OUTPATIENT)
Dept: CARDIOLOGY | Facility: CLINIC | Age: 62
End: 2022-09-18

## 2022-09-18 ENCOUNTER — APPOINTMENT (OUTPATIENT)
Dept: CT IMAGING | Facility: CLINIC | Age: 62
DRG: 393 | End: 2022-09-18
Attending: INTERNAL MEDICINE
Payer: COMMERCIAL

## 2022-09-18 ENCOUNTER — APPOINTMENT (OUTPATIENT)
Dept: GENERAL RADIOLOGY | Facility: CLINIC | Age: 62
DRG: 393 | End: 2022-09-18
Attending: EMERGENCY MEDICINE
Payer: COMMERCIAL

## 2022-09-18 ENCOUNTER — HOSPITAL ENCOUNTER (INPATIENT)
Facility: CLINIC | Age: 62
LOS: 11 days | Discharge: HOME HEALTH | DRG: 393 | End: 2022-09-29
Attending: EMERGENCY MEDICINE | Admitting: INTERNAL MEDICINE
Payer: COMMERCIAL

## 2022-09-18 DIAGNOSIS — R10.9 ABDOMINAL PAIN, UNSPECIFIED ABDOMINAL LOCATION: ICD-10-CM

## 2022-09-18 DIAGNOSIS — T82.9XXA COMPLICATION INVOLVING LEFT VENTRICULAR ASSIST DEVICE (LVAD), INITIAL ENCOUNTER: ICD-10-CM

## 2022-09-18 DIAGNOSIS — A04.72 CLOSTRIDIUM DIFFICILE ENTEROCOLITIS: ICD-10-CM

## 2022-09-18 DIAGNOSIS — R11.0 NAUSEA: ICD-10-CM

## 2022-09-18 DIAGNOSIS — A04.72 C. DIFFICILE DIARRHEA: ICD-10-CM
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
## 2022-09-18 DIAGNOSIS — R19.7 ACUTE DIARRHEA: ICD-10-CM

## 2022-09-18 DIAGNOSIS — I50.20 HEART FAILURE WITH REDUCED EJECTION FRACTION, NYHA CLASS III (H): ICD-10-CM

## 2022-09-18 DIAGNOSIS — M32.9 SYSTEMIC LUPUS ERYTHEMATOSUS, UNSPECIFIED SLE TYPE, UNSPECIFIED ORGAN INVOLVEMENT STATUS (H): ICD-10-CM

## 2022-09-18 DIAGNOSIS — Z20.822 CONTACT WITH AND (SUSPECTED) EXPOSURE TO COVID-19: ICD-10-CM

## 2022-09-18 DIAGNOSIS — T82.9XXS COMPLICATION INVOLVING LEFT VENTRICULAR ASSIST DEVICE (LVAD), SEQUELA: Primary | ICD-10-CM

## 2022-09-18 DIAGNOSIS — Z95.811 LVAD (LEFT VENTRICULAR ASSIST DEVICE) PRESENT (H): ICD-10-CM

## 2022-09-18 DIAGNOSIS — F32.A DEPRESSION, UNSPECIFIED DEPRESSION TYPE: ICD-10-CM

## 2022-09-18 LAB
ALBUMIN SERPL BCG-MCNC: 3.9 G/DL (ref 3.5–5.2)
ALP SERPL-CCNC: 112 U/L (ref 40–129)
ALT SERPL W P-5'-P-CCNC: 14 U/L (ref 10–50)
ANION GAP SERPL CALCULATED.3IONS-SCNC: 10 MMOL/L (ref 7–15)
AST SERPL W P-5'-P-CCNC: 39 U/L (ref 10–50)
BASOPHILS # BLD AUTO: 0.1 10E3/UL (ref 0–0.2)
BASOPHILS NFR BLD AUTO: 1 %
BILIRUB SERPL-MCNC: 0.4 MG/DL
BUN SERPL-MCNC: 13.1 MG/DL (ref 8–23)
CALCIUM SERPL-MCNC: 9.6 MG/DL (ref 8.8–10.2)
CHLORIDE SERPL-SCNC: 104 MMOL/L (ref 98–107)
CREAT SERPL-MCNC: 0.68 MG/DL (ref 0.67–1.17)
CRP SERPL-MCNC: 9.97 MG/L
DEPRECATED HCO3 PLAS-SCNC: 22 MMOL/L (ref 22–29)
EOSINOPHIL # BLD AUTO: 0.1 10E3/UL (ref 0–0.7)
EOSINOPHIL NFR BLD AUTO: 1 %
ERYTHROCYTE [DISTWIDTH] IN BLOOD BY AUTOMATED COUNT: 16.8 % (ref 10–15)
GFR SERPL CREATININE-BSD FRML MDRD: >90 ML/MIN/1.73M2
GLUCOSE SERPL-MCNC: 87 MG/DL (ref 70–99)
HCT VFR BLD AUTO: 33.7 % (ref 40–53)
HGB BLD-MCNC: 10.4 G/DL (ref 13.3–17.7)
IMM GRANULOCYTES # BLD: 0 10E3/UL
IMM GRANULOCYTES NFR BLD: 0 %
INR PPP: 1.56 (ref 0.85–1.15)
LDH SERPL L TO P-CCNC: 315 U/L (ref 0–250)
LYMPHOCYTES # BLD AUTO: 1.1 10E3/UL (ref 0.8–5.3)
LYMPHOCYTES NFR BLD AUTO: 21 %
MCH RBC QN AUTO: 28.8 PG (ref 26.5–33)
MCHC RBC AUTO-ENTMCNC: 30.9 G/DL (ref 31.5–36.5)
MCV RBC AUTO: 93 FL (ref 78–100)
MONOCYTES # BLD AUTO: 0.5 10E3/UL (ref 0–1.3)
MONOCYTES NFR BLD AUTO: 10 %
NEUTROPHILS # BLD AUTO: 3.4 10E3/UL (ref 1.6–8.3)
NEUTROPHILS NFR BLD AUTO: 67 %
NRBC # BLD AUTO: 0 10E3/UL
NRBC BLD AUTO-RTO: 0 /100
NT-PROBNP SERPL-MCNC: 763 PG/ML (ref 0–900)
PLATELET # BLD AUTO: 231 10E3/UL (ref 150–450)
POTASSIUM SERPL-SCNC: 4.6 MMOL/L (ref 3.4–5.3)
PROT SERPL-MCNC: 7.5 G/DL (ref 6.4–8.3)
RBC # BLD AUTO: 3.61 10E6/UL (ref 4.4–5.9)
SODIUM SERPL-SCNC: 136 MMOL/L (ref 136–145)
TROPONIN T SERPL HS-MCNC: 42 NG/L
TSH SERPL DL<=0.005 MIU/L-ACNC: 1.67 UIU/ML (ref 0.3–4.2)
WBC # BLD AUTO: 5.2 10E3/UL (ref 4–11)

## 2022-09-18 PROCEDURE — 250N000013 HC RX MED GY IP 250 OP 250 PS 637

## 2022-09-18 PROCEDURE — 85610 PROTHROMBIN TIME: CPT | Performed by: EMERGENCY MEDICINE

## 2022-09-18 PROCEDURE — 74177 CT ABD & PELVIS W/CONTRAST: CPT | Mod: 26 | Performed by: RADIOLOGY

## 2022-09-18 PROCEDURE — 74177 CT ABD & PELVIS W/CONTRAST: CPT

## 2022-09-18 PROCEDURE — 80053 COMPREHEN METABOLIC PANEL: CPT | Performed by: EMERGENCY MEDICINE

## 2022-09-18 PROCEDURE — 96360 HYDRATION IV INFUSION INIT: CPT | Mod: 59 | Performed by: EMERGENCY MEDICINE

## 2022-09-18 PROCEDURE — 86140 C-REACTIVE PROTEIN: CPT | Performed by: EMERGENCY MEDICINE

## 2022-09-18 PROCEDURE — 36415 COLL VENOUS BLD VENIPUNCTURE: CPT | Performed by: EMERGENCY MEDICINE

## 2022-09-18 PROCEDURE — 71045 X-RAY EXAM CHEST 1 VIEW: CPT

## 2022-09-18 PROCEDURE — 214N000001 HC R&B CCU UMMC

## 2022-09-18 PROCEDURE — 83615 LACTATE (LD) (LDH) ENZYME: CPT | Performed by: EMERGENCY MEDICINE

## 2022-09-18 PROCEDURE — 99285 EMERGENCY DEPT VISIT HI MDM: CPT | Mod: 25 | Performed by: EMERGENCY MEDICINE

## 2022-09-18 PROCEDURE — 258N000003 HC RX IP 258 OP 636: Performed by: EMERGENCY MEDICINE

## 2022-09-18 PROCEDURE — 99291 CRITICAL CARE FIRST HOUR: CPT | Mod: 25 | Performed by: EMERGENCY MEDICINE

## 2022-09-18 PROCEDURE — 96361 HYDRATE IV INFUSION ADD-ON: CPT | Performed by: EMERGENCY MEDICINE

## 2022-09-18 PROCEDURE — C9803 HOPD COVID-19 SPEC COLLECT: HCPCS | Performed by: EMERGENCY MEDICINE

## 2022-09-18 PROCEDURE — 83880 ASSAY OF NATRIURETIC PEPTIDE: CPT | Performed by: EMERGENCY MEDICINE

## 2022-09-18 PROCEDURE — 84443 ASSAY THYROID STIM HORMONE: CPT | Performed by: EMERGENCY MEDICINE

## 2022-09-18 PROCEDURE — 71045 X-RAY EXAM CHEST 1 VIEW: CPT | Mod: 26 | Performed by: RADIOLOGY

## 2022-09-18 PROCEDURE — 250N000011 HC RX IP 250 OP 636: Performed by: INTERNAL MEDICINE

## 2022-09-18 PROCEDURE — 250N000012 HC RX MED GY IP 250 OP 636 PS 637

## 2022-09-18 PROCEDURE — 84484 ASSAY OF TROPONIN QUANT: CPT | Performed by: EMERGENCY MEDICINE

## 2022-09-18 PROCEDURE — 85025 COMPLETE CBC W/AUTO DIFF WBC: CPT | Performed by: EMERGENCY MEDICINE

## 2022-09-18 RX ORDER — HYDROXYCHLOROQUINE SULFATE 200 MG/1
200 TABLET, FILM COATED ORAL 2 TIMES DAILY
Status: DISCONTINUED | OUTPATIENT
Start: 2022-09-18 | End: 2022-09-29 | Stop reason: HOSPADM

## 2022-09-18 RX ORDER — QUETIAPINE FUMARATE 50 MG/1
150 TABLET, FILM COATED ORAL AT BEDTIME
Status: DISCONTINUED | OUTPATIENT
Start: 2022-09-18 | End: 2022-09-29 | Stop reason: HOSPADM

## 2022-09-18 RX ORDER — WARFARIN SODIUM 2.5 MG/1
2.5 TABLET ORAL ONCE
Status: COMPLETED | OUTPATIENT
Start: 2022-09-19 | End: 2022-09-19

## 2022-09-18 RX ORDER — LISINOPRIL 10 MG/1
10 TABLET ORAL DAILY
Status: DISCONTINUED | OUTPATIENT
Start: 2022-09-19 | End: 2022-09-29 | Stop reason: HOSPADM

## 2022-09-18 RX ORDER — VANCOMYCIN HYDROCHLORIDE 125 MG/1
125 CAPSULE ORAL 4 TIMES DAILY
Status: CANCELLED | OUTPATIENT
Start: 2022-09-18

## 2022-09-18 RX ORDER — ACETAMINOPHEN 325 MG/1
975 TABLET ORAL EVERY 8 HOURS
Status: DISCONTINUED | OUTPATIENT
Start: 2022-09-18 | End: 2022-09-21

## 2022-09-18 RX ORDER — WARFARIN SODIUM 7.5 MG/1
7.5 TABLET ORAL
Status: DISCONTINUED | OUTPATIENT
Start: 2022-09-18 | End: 2022-09-18

## 2022-09-18 RX ORDER — MYCOPHENOLATE MOFETIL 500 MG/1
1500 TABLET ORAL
Status: DISCONTINUED | OUTPATIENT
Start: 2022-09-18 | End: 2022-09-24

## 2022-09-18 RX ORDER — WARFARIN SODIUM 5 MG/1
5 TABLET ORAL
Status: DISCONTINUED | OUTPATIENT
Start: 2022-09-18 | End: 2022-09-18

## 2022-09-18 RX ORDER — PANTOPRAZOLE SODIUM 40 MG/1
40 TABLET, DELAYED RELEASE ORAL DAILY
Status: DISCONTINUED | OUTPATIENT
Start: 2022-09-19 | End: 2022-09-29 | Stop reason: HOSPADM

## 2022-09-18 RX ORDER — SERTRALINE HYDROCHLORIDE 100 MG/1
100 TABLET, FILM COATED ORAL DAILY
Status: DISCONTINUED | OUTPATIENT
Start: 2022-09-19 | End: 2022-09-20

## 2022-09-18 RX ORDER — TAMSULOSIN HYDROCHLORIDE 0.4 MG/1
0.4 CAPSULE ORAL DAILY
Status: DISCONTINUED | OUTPATIENT
Start: 2022-09-19 | End: 2022-09-29 | Stop reason: HOSPADM

## 2022-09-18 RX ORDER — HYDROCORTISONE 10 MG/G
CREAM TOPICAL 3 TIMES DAILY PRN
Status: DISCONTINUED | OUTPATIENT
Start: 2022-09-18 | End: 2022-09-29 | Stop reason: HOSPADM

## 2022-09-18 RX ORDER — VANCOMYCIN HYDROCHLORIDE 125 MG/1
125 CAPSULE ORAL 4 TIMES DAILY
Status: COMPLETED | OUTPATIENT
Start: 2022-09-18 | End: 2022-09-21

## 2022-09-18 RX ORDER — DIGOXIN 125 MCG
125 TABLET ORAL DAILY
Status: DISCONTINUED | OUTPATIENT
Start: 2022-09-19 | End: 2022-09-29 | Stop reason: HOSPADM

## 2022-09-18 RX ORDER — IOPAMIDOL 755 MG/ML
81 INJECTION, SOLUTION INTRAVASCULAR ONCE
Status: COMPLETED | OUTPATIENT
Start: 2022-09-18 | End: 2022-09-18

## 2022-09-18 RX ORDER — LANOLIN ALCOHOL/MO/W.PET/CERES
3 CREAM (GRAM) TOPICAL
Status: DISCONTINUED | OUTPATIENT
Start: 2022-09-18 | End: 2022-09-29 | Stop reason: HOSPADM

## 2022-09-18 RX ORDER — QUETIAPINE FUMARATE 25 MG/1
25 TABLET, FILM COATED ORAL DAILY
Status: DISCONTINUED | OUTPATIENT
Start: 2022-09-19 | End: 2022-09-29 | Stop reason: HOSPADM

## 2022-09-18 RX ORDER — QUETIAPINE FUMARATE 25 MG/1
25 TABLET, FILM COATED ORAL
Status: DISCONTINUED | OUTPATIENT
Start: 2022-09-19 | End: 2022-09-29 | Stop reason: HOSPADM

## 2022-09-18 RX ORDER — HYDRALAZINE HYDROCHLORIDE 50 MG/1
50 TABLET, FILM COATED ORAL 3 TIMES DAILY
Status: DISCONTINUED | OUTPATIENT
Start: 2022-09-18 | End: 2022-09-23

## 2022-09-18 RX ORDER — ATORVASTATIN CALCIUM 40 MG/1
40 TABLET, FILM COATED ORAL EVERY EVENING
Status: DISCONTINUED | OUTPATIENT
Start: 2022-09-18 | End: 2022-09-29 | Stop reason: HOSPADM

## 2022-09-18 RX ORDER — WARFARIN SODIUM 2.5 MG/1
TABLET ORAL
Status: CANCELLED | OUTPATIENT
Start: 2022-09-18

## 2022-09-18 RX ADMIN — SODIUM CHLORIDE, POTASSIUM CHLORIDE, SODIUM LACTATE AND CALCIUM CHLORIDE 500 ML: 600; 310; 30; 20 INJECTION, SOLUTION INTRAVENOUS at 20:29

## 2022-09-18 RX ADMIN — VANCOMYCIN HYDROCHLORIDE 125 MG: 125 CAPSULE ORAL at 23:30

## 2022-09-18 RX ADMIN — MYCOPHENOLATE MOFETIL 1500 MG: 500 TABLET, FILM COATED ORAL at 23:33

## 2022-09-18 RX ADMIN — SALINE NASAL SPRAY 2 SPRAY: 1.5 SOLUTION NASAL at 23:36

## 2022-09-18 RX ADMIN — HYDROXYCHLOROQUINE SULFATE 200 MG: 200 TABLET, FILM COATED ORAL at 23:32

## 2022-09-18 RX ADMIN — ACETAMINOPHEN 975 MG: 325 TABLET, FILM COATED ORAL at 23:29

## 2022-09-18 RX ADMIN — Medication 3 MG: at 23:37

## 2022-09-18 RX ADMIN — ATORVASTATIN CALCIUM 40 MG: 40 TABLET, FILM COATED ORAL at 23:30

## 2022-09-18 RX ADMIN — Medication 150 MG: at 23:32

## 2022-09-18 RX ADMIN — IOPAMIDOL 81 ML: 755 INJECTION, SOLUTION INTRAVENOUS at 22:09

## 2022-09-18 RX ADMIN — HYDRALAZINE HYDROCHLORIDE 50 MG: 50 TABLET, FILM COATED ORAL at 23:31

## 2022-09-18 ASSESSMENT — ACTIVITIES OF DAILY LIVING (ADL)
ADLS_ACUITY_SCORE: 39

## 2022-09-18 NOTE — ED TRIAGE NOTES
Pt presents to ER with C/O diarrhea, fatigue, nausea and LVAD alarming low flows today. Currently on Vanco for C Diff.      Triage Assessment     Row Name 09/18/22 [id number]       Triage Assessment (Adult)    Airway WDL WDL       Respiratory WDL    Respiratory WDL WDL       Skin Circulation/Temperature WDL    Skin Circulation/Temperature WDL WDL       Cardiac WDL    Cardiac WDL X  LVAD alarming low flow today       Peripheral/Neurovascular WDL    Peripheral Neurovascular WDL WDL       Cognitive/Neuro/Behavioral WDL    Cognitive/Neuro/Behavioral WDL WDL

## 2022-09-18 NOTE — H&P
[facility]    Cardiology History and Physical - Cardiology         Date of Admission:  9/18/2022    Assessment & Plan: SJUAN Dorman EDUARD Sands is a 61 year old male with a PMHx of SLE, antiphospholipid syndrome, pulmonary embolism (on warfarin), ICM (EF 10-15%) s/p HM3 LVAD (7/28/22), and C. Diff infection (on PO vancomycin taper) who is currently admitted for low flow alarms and ongoing profuse diarrhea.    Diarrhea  C. Diff infection  Prior to admit patient 125 mg PO vancomycin 4 times daily until 9/21/22. Patient denies any missed doses. He states he initially improved with PO vancomycin, but over the past day his symptoms have been worsening. Prior to presenting to ED the patient had 4 loose, brown, watery stools at home.  - CT A/P negative for toxic megacolon  - ID consult  - PTA PO vancomycin 125 mg QID    Stage D HFrEF 2/2 ICM (EF 10-15%) s/p HM3 LVAD (7/28/22) and CRT-D ('06)  LVAD low flow events  CAD s/p PCI to LAD ('05)  - LVAD: speed 5200, flow 3.3, power 3.8, PI 6.6  - Volume status: Mildly hypovolemic (weight on 9/15 discharge 134 lbs, weight this admit 132 lbs, no JVD, skin tenting noted but adequate capillary refill and no dry mucus membranes)   > 500cc LR in ED  - GDMT   > BB: Discontinued during previous admission with cardiogenic shock   > ACEi/ARB/ARNi: PTA lisinopril 10 mg daily   > MRA: None   > SGLT2i: None  - SCD ppx: s/p CRT-D (2006)  - Pharmacy to dose warfarin   > on 5 mg daily at home (INR goal 2-3)   > INR 1.58 on admission, giving an extra 2.5 mg warfarin tonight  - PTA digoxin 125 mcg daily  - PTA lipitor 40 mg daily  - Telemetry monitoring  - 2 g sodium, <2 L fluid restriction diet  - Strict I/O  - Daily weights    HTN  - PTA hydralazine 50 mg daily  - PTA lisinopril 10 mg daily    MIGUEL  - PTA seroquel 25 mg QAM, 25 mg at noon, 150 mg QPM  - PTA zoloft 100 mg daily    GERD  Dysphagia  - PTA protonix    SLE  APS  Hx of DVT/PE  On  warfarin PTA (INR goal 2-3). Decreased to 5 mg daily during last admission due to mucus membrane bleeding.  - Pharmacy to dose warfarin  - PTA hydroxychloroquine  - PTA mycophenolate    Risk for malnutrition   - Nutrition consult  - PT/OT consult  - PTA thiamine 100 mg    BPH  - PTA tamsulosin    Mild-moderate emphysema  - Not currently using any PTA inhalers     Diet: 2 g sodium, 2 L fluid restriction diet  DVT Prophylaxis: Warfarin  Fitzgerald Catheter: Not present  Code Status: Full  Fluids: 500 ml LR  Lines: PIV       Rhea Watson MD  [facility]    ______________________________________________________________________    Chief Complaint   Diarrhea, low flow alarms    History is obtained from the patient    History of Present Illness   Dandy Sands is a 61 year old male with a PMHx of SLE, antiphospholipid syndrome, pulmonary embolism (on warfarin), ICM (EF 10-15%) s/p HM3 LVAD (7/28/22), and C. Diff infection (on PO vancomycin taper) who is currently admitted for low flow alarms and ongoing profuse diarrhea.  The patient was recently admitted from ARU for low flow alarms on 9/5/22. TTE showed an underfilled LV so LVAD speed was reduced to 5200. Patient continued to have intermittent low flow alarms so CTA chest performed which revealed patent LVAD outflow tracts. He had recently had profuse epistaxis that occurred 1 hr after taking PTA warfarin which requiring packing, soft stools, and symptoms of orthostasis suggestive of hypovolemia. He was given fluids and encouraged to liberalize volume intake after which he felt better so he was discharged on 9/5/22. The patient was re-admitted the following day 9/6/22 due to ongoing loose stools now with mucus and recurrent low flow alarms.  He tested positive for C. Diff so the patient was started on PO vancomycin with PRN fluids for hypovolemia. He was feeling better with less frequent diarrhea so the patient was  discharged on PO vancomycin to complete 14 day course (through 9/21/22). Since discharge the patient states his diarrhea was controlled until today when it worsened to level it was prior to starting vancomycin. His diarrhea continues to be light brown and watery. He had roughly 4 loose stools per day. He has mild LUQ abdominal pain. He denies fevers, chills, chest pain, SOB, nausea, vomiting, hematochezia, melena. The patient feels that he has been having a difficult time keeping up with fluid losses and his son notices that he has had poor PO intake on occasion. His son reports 6 low flow alarms went off today at home with the lowest reading at 2.1.     Last TTE 9/6/22: LV function decreased, EF 10-15%, LVAD cannula in LV apex, mild RV dilation, global RV function mildly reduced, AV remained closed throughout cardiac cycle with mild, continuous AR.     Review of Systems    The 10 point Review of Systems is negative other than noted in the HPI or here.     Past Medical History    I have reviewed this patient's medical history and updated it with pertinent information if needed.   Past Medical History:   Diagnosis Date     Antiphospholipid antibody syndrome (H)      CAD (coronary artery disease)      Chronic systolic heart failure (H)      Ischemic cardiomyopathy      Mitral regurgitation      Systemic lupus erythematosus (H)        Past Surgical History   I have reviewed this patient's surgical history and updated it with pertinent information if needed.  Past Surgical History:   Procedure Laterality Date     COLONOSCOPY N/A 05/23/2022    Procedure: COLONOSCOPY;  Surgeon: Parth Meneses MD;  Location: [facility]     CV CORONARY ANGIOGRAM N/A 05/24/2022    Procedure: Coronary Angiogram;  Surgeon: Mike Pope MD;  Location: [facility] CARDIAC CATH LAB     CV CORONARY ANGIOGRAM N/A 05/29/2022    Procedure: Coronary Angiogram;  Surgeon: Austin Bright MD;  Location: [facility] CARDIAC CATH LAB     CV CORONARY  LITHOTRIPSY PCI Bilateral 05/24/2022    Procedure: Percutaneous Coronary Intervention - Lithotripsy;  Surgeon: Mike Pope MD;  [facility]     CV INTRA AORTIC BALLOON N/A 06/17/2022    Procedure: Intraprocedure Aortic Balloon Pump Insertion;  Surgeon: Sherman Cerda MD;  [facility]     CV INTRA AORTIC BALLOON Right 07/03/2022    Procedure: Reposition of Subclavian Intraprocedure Aortic Balloon Pump;  Surgeon: Austin Bright MD;  [facility]     CV INTRAVASULAR ULTRASOUND N/A 05/24/2022    Procedure: Intravascular Ultrasound;  Surgeon: Mike Pope MD;  [facility]     CV PCI ANGIOPLASTY N/A 05/29/2022    Procedure: Percutaneous Transluminal Angioplasty;  Surgeon: Austin Bright MD;  [facility]     CV PCI STENT DRUG ELUTING N/A 05/24/2022    Procedure: Percutaneous Coronary Intervention Stent;  Surgeon: Mike Pope MD;  [facility]     CV RIGHT HEART CATH MEASUREMENTS RECORDED N/A 05/18/2022    Procedure: Right Heart Catheterization;  Surgeon: Justo Stewart MD;  [facility]     CV RIGHT HEART CATH MEASUREMENTS RECORDED N/A 05/24/2022    Procedure: Right Heart Catheterization;  Surgeon: Mike Pope MD;  [facility]     CV RIGHT HEART CATH MEASUREMENTS RECORDED N/A 05/29/2022    Procedure: Right Heart Catheterization;  Surgeon: Austin Bright MD;  [facility]     CV RIGHT HEART CATH MEASUREMENTS RECORDED N/A 07/01/2022    Procedure: Right Heart Catheterization;  Surgeon: Mike Pope MD;  [facility]     CV RIGHT HEART EXERCISE STRESS STUDY N/A 05/18/2022    Procedure: Stress Drug Study;  Surgeon: Justo Stewart MD;  [facility]     CV SWAN CHOCO PROCEDURE N/A 06/17/2022    Procedure: [place] Choco Procedure;   Surgeon: Sherman Cerda MD;  [facility]     INCISION AND DRAINAGE CHEST WASHOUT, COMBINED N/A 07/11/2022    Procedure: Chest washout and closure;  Surgeon: Darnell Morgan MD;  [facility]     INCISION AND DRAINAGE CHEST WASHOUT, COMBINED N/A 07/12/2022    Procedure: INCISION AND DRAINAGE, WOUND, CHEST, WITH IRRIGATION;  Surgeon: Darius Zamora MD;  Location: [facility]     INSERT ARTERIAL LINE  07/18/2022          INSERT INTRAAORTIC BALLOON PUMP Right 06/23/2022    Procedure: INSERTION, INTRA-AORTIC BALLOON PUMP RIGHT SUBCLAVIAN ARTERY.;  Surgeon: Hayden Veras MD;  Location: [facility]     INSERT VENTRICULAR ASSIST DEVICE LEFT (HEARTMATE II/III) MINIMALLY INVASIVE N/A 07/08/2022    Procedure: MEDIAN STERNOTOMY.  CARDIOPULMONARY BYPASS.  INTRAOPERATIVE TRANSESOPHAGEAL ECHOCARDIOGRAM.  IMPLANT LEFT VENTRICULAR ASSIST DEVICE HEARTMATE III.  PLACEMENT OF PERCUTANEOUS RIGHT VENTRICULAR ASSIST DEVICE.  TEMPORARY CHEST CLOSURE;  Surgeon: Darius Zamora MD;  Location: [facility]     IR CHEST TUBE PLACEMENT NON-TUNNELED RIGHT  08/01/2022     IRRIGATION AND DEBRIDEMENT CHEST WASHOUT, COMBINED N/A 07/13/2022    Procedure: IRRIGATION AND DEBRIDEMENT, CHEST;  Surgeon: Darius Zamora MD;  Location: [facility]     MIDLINE DOUBLE LUMEN PLACEMENT Left 09/11/2022    Mid line ok to use     MIDLINE DOUBLE LUMEN PLACEMENT Right 09/14/2022    5FR DL midline.     PICC DOUBLE LUMEN PLACEMENT Right 05/28/2022    right basilic 5 fr dl picc 40 cm     PICC DOUBLE LUMEN PLACEMENT Right 08/15/2022    Right Lateral, 41 total length, 3 cm external length       Social History   I have reviewed this patient's social history and updated it with pertinent information if needed.     Family History   I have reviewed this patient's family history and updated it with pertinent information if needed.     Prior to Admission Medications   Prior to Admission Medications   Prescriptions Last Dose Informant Patient  Reported? Taking?   Melatonin 10 MG TABS tablet   No No   Sig: Take 1 tablet (10 mg) by mouth At Bedtime for 30 days   QUEtiapine (SEROQUEL) 25 MG tablet   No No   Sig: Take 1 tablet (25 mg) by mouth 2 times daily for 30 days   QUEtiapine (SEROQUEL) 50 MG tablet   No No   Sig: Take 3 tablets (150 mg) by mouth At Bedtime for 30 days   acetaminophen (TYLENOL) 325 MG tablet   No No   Sig: Take 3 tablets (975 mg) by mouth every 8 hours for 30 days   atorvastatin (LIPITOR) 40 MG tablet   No No   Sig: Take 1 tablet (40 mg) by mouth every evening for 30 days   digoxin (LANOXIN) 125 MCG tablet   No No   Sig: Take 1 tablet (125 mcg) by mouth daily for 30 days   fluticasone-vilanterol (BREO ELLIPTA) 100-25 MCG/INH inhaler   No No   Sig: Inhale 1 puff into the lungs daily for 30 days   hydrALAZINE (APRESOLINE) 50 MG tablet   No No   Sig: Take 1 tablet (50 mg) by mouth 3 times daily for 30 days   hydrOXYzine (ATARAX) 25 MG tablet   No No   Sig: Take 1 tablet (25 mg) by mouth every 6 hours as needed for anxiety (ANXIETY)   hydrocortisone 1 % CREA cream   No No   Sig: Place rectally 3 times daily   hydroxychloroquine (PLAQUENIL) 200 MG tablet   No No   Sig: Take 1 tablet (200 mg) by mouth 2 times daily   lisinopril (ZESTRIL) 10 MG tablet   No No   Sig: Take 1 tablet (10 mg) by mouth daily for 30 days   multivitamin w/minerals (THERA-VIT-M) tablet   No No   Sig: Take 1 tablet by mouth daily   mycophenolate (GENERIC EQUIVALENT) 500 MG tablet   No No   Sig: Take 3 tablets (1,500 mg) by mouth 2 times daily for 30 days   pantoprazole (PROTONIX) 40 MG EC tablet   No No   Sig: Take 1 tablet (40 mg) by mouth daily   pramox-pe-glycerin-petrolatum (PREPARATION H) 1-0.25-14.4-15 % CREA cream   No No   Sig: Place rectally 2 times daily as needed for hemorrhoids Apply immediately after a bowel movement.   promethazine (PHENERGAN) 12.5 MG tablet   No No   Sig: Take 1 tablet (12.5 mg) by mouth every 6 hours as needed for nausea or vomiting    saline nasal (AYR SALINE) GEL topical gel   Yes No   Sig: Apply into each nare At Bedtime   sertraline (ZOLOFT) 100 MG tablet   No No   Sig: Take 1 tablet (100 mg) by mouth daily for 30 days   sodium chloride (OCEAN) 0.65 % nasal spray   Yes No   Sig: Spray 2 sprays into both nostrils every 4 hours (while awake)   tamsulosin (FLOMAX) 0.4 MG capsule   Yes No   Sig: Take 0.4 mg by mouth daily   thiamine (B-1) 100 MG tablet   No No   Sig: Take 1 tablet (100 mg) by mouth daily   vancomycin (VANCOCIN) 125 MG capsule   No No   Sig: Take 1 capsule (125 mg) by mouth 4 times daily   warfarin ANTICOAGULANT (COUMADIN) 2.5 MG tablet   No No   Sig: Take 2 tabs (5mg) daily at bedtime. Future dose adjustments pending INR   zolpidem (AMBIEN) 5 MG tablet   Yes No   Sig: Take 5 mg by mouth nightly as needed for sleep      Facility-Administered Medications: None     Allergies   No Known Allergies    Physical Exam   Vital Signs: Temp: 97.4  F (36.3  C) Temp src: Oral BP: 100/76 Pulse: 58   Resp: 26 SpO2: 100 % O2 Device: None (Room air)    Weight: 132 lbs 0 oz    Constitutional: awake, alert, cooperative, no apparent distress, and appears stated age  Eyes: lids and lashes normal, extra-ocular muscles intact, sclera clear and conjunctiva normal  Respiratory: no increased work of breathing, good air exchange, no retractions and clear to auscultation  Cardiovascular: + LVAD hum  GI: non-distended, mild tenderness noted in LUQ with deep palpation  Skin: no bruising or bleeding  Musculoskeletal: no lower extremity pitting edema present  Neurologic: Mental Status Exam:  Level of Alertness:   awake  Orientation:   person, place, time    Data   Data reviewed today: I reviewed all medications, new labs and imaging results over the last 24 hours. I personally reviewed Cardiology specific data review: no imaging or EKG's to review for today.     Recent Labs   Lab 09/18/22  1938 09/15/22  0603 09/14/22  0450 09/13/22  0816 09/13/22  0807   WBC  5.2 4.6  --  5.6  --    HGB 10.4* 9.0*  --  8.8*  --    MCV 93 96  --  98  --     212  --  232  --    INR 1.56* 2.30* 2.31*  --  2.39*     --  142  --  139   POTASSIUM 4.6 4.1 4.4  4.2  --  4.1   CHLORIDE 104  --  107  --  106   CO2 22  --  24  --  26   BUN 13.1  --  11.1  --  9.1   CR 0.68  --  0.62*  --  0.63*   ANIONGAP 10  --  11  --  7   ELDA 9.6  --  9.3  --  8.9   GLC 87  --  88  --  90   ALBUMIN 3.9  --   --   --   --    PROTTOTAL 7.5  --   --   --   --    BILITOTAL 0.4  --   --   --   --    ALKPHOS 112  --   --   --   --    ALT 14  --   --   --   --    AST 39  --   --   --   --

## 2022-09-18 NOTE — PROGRESS NOTES
D: Received page from son Amos in regards to patient having alarms.    I/A: Low flow alarms x 5 today. Dizziness, light-headedness and almost fell with las flow alarm. Diarrhea has started increasing.    P: I asked Amos to please bring patient into ED for assessment. Will discuss with Dr. Lawrence. Updated [facility] ED Sandie RN.  Caregiver notified to page on-call coordinator if symptoms worsen or with other concerns. Caregiver verbalized understanding.

## 2022-09-18 NOTE — ED PROVIDER NOTES
ED Provider Note  [facility]      History     Chief Complaint   Patient presents with     Diarrhea     Dizziness     Fatigue     HPI  Dandy Sands is a 61 year old male who is a PMH notable for HFrEF secondary to ICM s/p HeartMate 3 LVAD (7/8/2022), prior LVAD low flow events, history of CAD s/p PCI, s/p CRT-D (2006), emphysema, recurrent significant epistaxis episodes with subsequent blood loss anemia, SLE, antiphospholipid syndrome and history of prior DVT/PE, prior COVID, deconditioning, GERD, dysphagia, et al., recently diagnosed C. difficile and recent admission, presenting to the ED today recommendation of the LVAD coordinator/team with increasing low flow alarms in the setting of ongoing nonbloody diarrhea.    PRIOR HISTORY REVIEW:  Patient was just admitted to Cardiology in setting of recent c. Diff diagnosis, low flow, recent nose bleeds, etc. Discharged just a few days ago, continuing treatment for C. Diff (had ID consult while admitted).     CURRENT PRESENTATION:   Presents today w/ family because of watery diarrhea and low flow alarms at recommendation of Cards for further eval/admission. Stool was becoming a bit more solid yesterday and then today began having patience watery diarrhea again (nonbloody, and has been taking previously prescribed abx). Since then today, has had 6 low flow LVAD alarms. No fevers. No LH, dizziness, syncope/near syncope. No CP or breathing sx's. Is having some abdominal pain, some nausea and poor appetite, No urinary sx's. No new extremity sx's. No other symptoms or complaints at this time. Please see ROS for further details.     Past Medical History  Past Medical History:   Diagnosis Date     Antiphospholipid antibody syndrome (H)      CAD (coronary artery disease)      Chronic systolic heart failure (H)      Ischemic cardiomyopathy      Mitral regurgitation      Systemic lupus erythematosus (H)      Past Surgical History:   Procedure Laterality  Date     COLONOSCOPY N/A 05/23/2022    Procedure: COLONOSCOPY;  Surgeon: Parth Meneses MD;  Location: [facility] OR     CV CORONARY ANGIOGRAM N/A 05/24/2022    Procedure: Coronary Angiogram;  Surgeon: Mike Pope MD;  Location: [facility] HE[facility] CARDIAC CATH LAB     CV CORONARY ANGIOGRAM N/A 05/29/2022    Procedure: Coronary Angiogram;  Surgeon: Austin Bright MD;  Location: [facility] C[facility]     CV CORONARY LITHOTRIPSY PCI Bilateral 05/24/2022    Procedure: Percutaneous Coronary Intervention - Lithotripsy;  Surgeon: Mike Pope MD;  [facility]     CV INTRA AORTIC BALLOON N/A 06/17/2022    Procedure: Intraprocedure Aortic Balloon Pump Insertion;  Surgeon: Sherman Cerda MD;  [facility]     CV INTRA AORTIC BALLOON Right 07/03/2022    Procedure: Reposition of Subclavian Intraprocedure Aortic Balloon Pump;  Surgeon: Austin Bright MD;  [facility]     CV INTRAVASULAR ULTRASOUND N/A 05/24/2022    Procedure: Intravascular Ultrasound;  Surgeon: Mike Pope MD;  [facility]     CV PCI ANGIOPLASTY N/A 05/29/2022    Procedure: Percutaneous Transluminal Angioplasty;  Surgeon: Austin Bright MD;  [facility]     CV PCI STENT DRUG ELUTING N/A 05/24/2022    Procedure: Percutaneous Coronary Intervention Stent;  Surgeon: Mike Pope MD;  [facility]     CV RIGHT HEART CATH MEASUREMENTS RECORDED N/A 05/18/2022    Procedure: Right Heart Catheterization;  Surgeon: Justo Stewart MD;  Location:  HEART CARDIAC CATH LAB     CV RIGHT HEART CATH MEASUREMENTS RECORDED N/A 05/24/2022    Procedure: Right Heart Catheterization;  Surgeon: Mike Pope MD;  Location:  HEART CARDIAC CATH LAB     CV RIGHT HEART CATH MEASUREMENTS RECORDED N/A 05/29/2022    Procedure: Right Heart Catheterization;  Surgeon: Austin Bright MD;  Location: [facility]  CARDIAC CATH LAB     CV RIGHT HEART CATH MEASUREMENTS RECORDED N/A 07/01/2022    Procedure: Right Heart Catheterization;  Surgeon: Mike Pope MD;  Location:  HEART CARDIAC CATH LAB     CV RIGHT HEART EXERCISE STRESS STUDY N/A 05/18/2022    Procedure: Stress Drug Study;  Surgeon: Justo Stewart MD;  Location:  HEART CARDIAC CATH LAB     CV SWAN CHOCO PROCEDURE N/A 06/17/2022    Procedure: [place] Choco Procedure;  Surgeon: Sherman Cerda MD;  Location:  HEART CARDIAC CATH LAB     INCISION AND DRAINAGE CHEST WASHOUT, COMBINED N/A 07/11/2022    Procedure: Chest washout and closure;  Surgeon: Darnell Morgan MD;  [facility]     INCISION AND DRAINAGE CHEST WASHOUT, COMBINED N/A 07/12/2022    Procedure: INCISION AND DRAINAGE, WOUND, CHEST, WITH IRRIGATION;  Surgeon: Darius Zamora MD;  Location: [facility] OR     INSERT ARTERIAL LINE  07/18/2022          INSERT INTRAAORTIC BALLOON PUMP Right 06/23/2022    Procedure: INSERTION, INTRA-AORTIC BALLOON PUMP RIGHT SUBCLAVIAN ARTERY.;  Surgeon: Hayden Veras MD;  Location: [facility]     INSERT VENTRICULAR ASSIST DEVICE LEFT (HEARTMATE II/III) MINIMALLY INVASIVE N/A 07/08/2022    Procedure: MEDIAN STERNOTOMY.  CARDIOPULMONARY BYPASS.  INTRAOPERATIVE TRANSESOPHAGEAL ECHOCARDIOGRAM.  IMPLANT LEFT VENTRICULAR ASSIST DEVICE HEARTMATE III.  PLACEMENT OF PERCUTANEOUS RIGHT VENTRICULAR ASSIST DEVICE.  TEMPORARY CHEST CLOSURE;  Surgeon: Darius Zamora MD;  Location: [facility]     IR CHEST TUBE PLACEMENT NON-TUNNELED RIGHT  08/01/2022     IRRIGATION AND DEBRIDEMENT CHEST WASHOUT, COMBINED N/A 07/13/2022    Procedure: IRRIGATION AND DEBRIDEMENT, CHEST;  Surgeon: Darius Zamora MD;  Location: [facility] OR     MIDLINE DOUBLE LUMEN PLACEMENT Left 09/11/2022    Mid line ok to use     MIDLINE DOUBLE LUMEN PLACEMENT Right 09/14/2022    5FR DL midline.     PICC DOUBLE LUMEN PLACEMENT Right 05/28/2022    right basilic 5 fr dl picc 40 cm     PICC DOUBLE LUMEN PLACEMENT  "Right 08/15/2022    Right Lateral, 41 total length, 3 cm external length     No current outpatient medications on file.    No Known Allergies  Family History  No family history on file.  Social History       Past medical history, past surgical history, medications, allergies, family history, and social history were reviewed with the patient. No additional pertinent items.       Review of Systems   Constitutional: Positive for fatigue.   Respiratory: Negative for cough and shortness of breath.    Cardiovascular: Negative for chest pain.   Gastrointestinal: Positive for abdominal pain, diarrhea and nausea. Negative for blood in stool and vomiting.   Neurological: Negative for syncope and light-headedness.   Hematological: Does not bruise/bleed easily.   Psychiatric/Behavioral: Negative for suicidal ideas.   All other systems reviewed and are negative.    A complete review of systems was performed with pertinent positives and negatives noted in the HPI, and all other systems negative.    Physical Exam   Pulse: 58  Temp: 97.4  F (36.3  C)  Resp: 26  Height: 170.2 cm (5' 7\")  Weight: 59.9 kg (132 lb)  SpO2: 100 %  Physical Exam  CONSTITUTIONAL: Well-developed and well-nourished. Awake and alert. Non-toxic appearance. No acute distress.   HENT:   - Head: Normocephalic and atraumatic.   - Ears: External ear grossly normal.   - Nose: Nose normal. No rhinorrhea. No epistaxis.   - Mouth/Throat: MMM  EYES: Conjunctivae and lids are normal. No scleral icterus.   NECK: Normal range of motion and phonation normal. Neck supple.  No tracheal deviation, no stridor. No edema or erythema noted.  CARDIOVASCULAR: Normal rate, regular rhythm and no appreciable abnormal heart sounds appreciated outside of LVAD whir   PULMONARY/CHEST: Normal work of breathing. No accessory muscle usage or stridor. No respiratory distress.  No appreciable abnormal breath sounds.  ABDOMEN: Soft, non-distended. No tenderness. No peritoneal findings, no " rigidity, rebound or guarding.  No palpable masses or abnormal pulsatility appreciated.  MUSCULOSKELETAL: Extremities warm and seemingly well perfused. No edema or calf tenderness.  NEUROLOGIC: Awake, alert. Not disoriented. No seizure activity. GCS 15  SKIN: Skin is warm and dry. No rash noted. No diaphoresis. No pallor.   PSYCHIATRIC: Normal mood and affect. Speech and behavior normal. Thought processes linear. Cognition and memory are normal. No SI/HI reported.    ED Course     ED Course as of 09/20/22 0150   Sun Sep 18, 2022   1804 Cards 2 staff page   1805 Heartmate 3 LEFT VS  LVAD Readings:   Speed 5200 rpm  Flow 3.3  PI 5.8  Power 3.6   1821 Discussed with cards to attending.  They were already aware of this patient coming in.  They recommended 500 mL of IV fluid, and then they will admit for further evaluation/management.  Appreciate their assistance in caring for this patient.    1922 Keeping n.p.o. until we get the CT results back.  Updated Cardiology w/ such but also patients desire to eat once safe to do so.             EKG Interpretation:      Interpreted by Alana Philippe MD  Time reviewed: 1901  Symptoms at time of EKG: Abdominal pain/diarrhea, occasional LVAD low flow alarms  Rhythm: Sinus  Rate: Normal  Axis: Left Axis Deviation  Ectopy: none  Conduction: Artifact noted in the setting of LVAD.  Wide QRS, Nonspecific intraventricular block, consistent with previous in the setting of LVAD  ST Segments/ T Waves: Abnormal ST/T wave in the setting of LVAD with nonspecific intraventricular block (generally similar to prior)  Comparison to prior: vs. Sept 6th, 16th and 19th, wide-complex with LAD appears generally similar to prior.  Morphology in V1/V2 looks most consistent in comparison to that of 6 September, but generally similar otherwise throughout  Clinical Impression: Sinus, wide-complex, LAD, artifact from LVAD, looks generally similar to prior.        Assessments & Plan (with Medical Decision  Making)   IMPRESSION:   61 year old male w/ PMH notable for HFrEF secondary to ICM s/p HeartMate 3 LVAD (7/8/2022), prior LVAD low flow events, history of CAD s/p PCI, s/p CRT-D (2006), emphysema, recurrent significant epistaxis episodes with subsequent blood loss anemia, SLE, antiphospholipid syndrome and history of prior DVT/PE, prior COVID, deconditioning, GERD, dysphagia, et al., recently diagnosed C. difficile and recent admission, presenting to the ED today recommendation of the LVAD coordinator/team with increasing low flow alarms in the setting of ongoing nonbloody diarrhea.    Clinically, patient appears nontoxic, NAD. Does have some abdominal discomfort, no patience peritoneal findings. No other acute findings. Looks similar to when I saw him during prior ED visit, though PI seems a bit lower currently to my recollection. Ddx for low flow includes, but not limited to, hypovolemia from diarrhea (which I think to be most likely, and diarrhea likely from ongoing c.diff as opposed to new etiology of such), though also considered other pressure differentials like HTN, clot, etc., but thought to be less likely    PLAN:   - Labs, urine studies, discussion/admit to Cards  - Risks/benefits of pursuing imaging reviewed and accepted.     INTERVENTIONS:   - IVF as per Cards    RE-EVALUATION:  - Pt otherwise continues to do well here in the ED, no acute issues or apparent concerning changes in vitals or clinical appearance.    DISCUSSIONS:  - w/ Cards: Reviewed patient/presentation, current state of workup/any pending studies. They will admit for further evaluation/management, F/U pending studies as needed, coordinate w/ consulting services as needed. No additional requests/recommendations for workup/management for in the ED at this time.   - w/ Patient: I have reviewed the available findings, plan with the patient and his family. They expressed understanding and agreement with this plan. All questions answered to the  best of our ability at this time.     DISPOSITION/PLANNING:  IMPRESSION:   - C. difficile/ongoing watery diarrhea (nonbloody)  - Abdominal pain, nausea  - LVAD low flow alarms  DISPOSITION:  - Admit to Cards 2 for further evaluation and management  PENDING:   -Screening CXR, CT A/P  OTHER RECOMMENDATIONS:   - Follow-up pending studies  - ID consult      ______________________________________________________________________        --  Alana Philippe MD  [facility] EMERGENCY DEPARTMENT  9/18/2022     Alana Philippe MD  09/20/22 0204

## 2022-09-18 NOTE — ED NOTES
Bed: ED16  Expected date: 9/18/22  Expected time:   Means of arrival:   Comments:  Dandy Sands [id number])  Being referred by the LVAD Coordinator for evaluation of low flow alarms.  He is experiencing dizziness and lightheadedness.  Has a c diff infection with continued diarrhea.  Hx:  HM3

## 2022-09-19 ENCOUNTER — APPOINTMENT (OUTPATIENT)
Dept: CARDIOLOGY | Facility: CLINIC | Age: 62
DRG: 393 | End: 2022-09-19
Attending: INTERNAL MEDICINE
Payer: COMMERCIAL

## 2022-09-19 ENCOUNTER — TELEPHONE (OUTPATIENT)
Dept: CARDIOLOGY | Facility: CLINIC | Age: 62
End: 2022-09-19

## 2022-09-19 LAB
ALBUMIN UR-MCNC: NEGATIVE MG/DL
ANION GAP SERPL CALCULATED.3IONS-SCNC: 11 MMOL/L (ref 7–15)
APPEARANCE UR: CLEAR
ATRIAL RATE - MUSE: 95 BPM
BASOPHILS # BLD AUTO: 0 10E3/UL (ref 0–0.2)
BASOPHILS NFR BLD AUTO: 1 %
BILIRUB UR QL STRIP: NEGATIVE
BUN SERPL-MCNC: 10.6 MG/DL (ref 8–23)
CALCIUM SERPL-MCNC: 9.6 MG/DL (ref 8.8–10.2)
CHLORIDE SERPL-SCNC: 107 MMOL/L (ref 98–107)
CHOLEST SERPL-MCNC: 93 MG/DL
COLOR UR AUTO: ABNORMAL
CREAT SERPL-MCNC: 0.64 MG/DL (ref 0.67–1.17)
DEPRECATED HCO3 PLAS-SCNC: 20 MMOL/L (ref 22–29)
DIASTOLIC BLOOD PRESSURE - MUSE: NORMAL MMHG
EOSINOPHIL # BLD AUTO: 0.1 10E3/UL (ref 0–0.7)
EOSINOPHIL NFR BLD AUTO: 2 %
ERYTHROCYTE [DISTWIDTH] IN BLOOD BY AUTOMATED COUNT: 17 % (ref 10–15)
GFR SERPL CREATININE-BSD FRML MDRD: >90 ML/MIN/1.73M2
GLUCOSE SERPL-MCNC: 93 MG/DL (ref 70–99)
GLUCOSE UR STRIP-MCNC: NEGATIVE MG/DL
HCT VFR BLD AUTO: 32.6 % (ref 40–53)
HDLC SERPL-MCNC: 43 MG/DL
HGB BLD-MCNC: 10 G/DL (ref 13.3–17.7)
HGB UR QL STRIP: NEGATIVE
IMM GRANULOCYTES # BLD: 0 10E3/UL
IMM GRANULOCYTES NFR BLD: 0 %
INR PPP: 1.61 (ref 0.85–1.15)
INTERPRETATION ECG - MUSE: NORMAL
KETONES UR STRIP-MCNC: NEGATIVE MG/DL
LDLC SERPL CALC-MCNC: 25 MG/DL
LEUKOCYTE ESTERASE UR QL STRIP: NEGATIVE
LYMPHOCYTES # BLD AUTO: 0.8 10E3/UL (ref 0.8–5.3)
LYMPHOCYTES NFR BLD AUTO: 16 %
MAGNESIUM SERPL-MCNC: 1.6 MG/DL (ref 1.7–2.3)
MCH RBC QN AUTO: 29.2 PG (ref 26.5–33)
MCHC RBC AUTO-ENTMCNC: 30.7 G/DL (ref 31.5–36.5)
MCV RBC AUTO: 95 FL (ref 78–100)
MONOCYTES # BLD AUTO: 0.5 10E3/UL (ref 0–1.3)
MONOCYTES NFR BLD AUTO: 10 %
MUCOUS THREADS #/AREA URNS LPF: PRESENT /LPF
NEUTROPHILS # BLD AUTO: 3.5 10E3/UL (ref 1.6–8.3)
NEUTROPHILS NFR BLD AUTO: 71 %
NITRATE UR QL: NEGATIVE
NONHDLC SERPL-MCNC: 50 MG/DL
NRBC # BLD AUTO: 0 10E3/UL
NRBC BLD AUTO-RTO: 0 /100
P AXIS - MUSE: NORMAL DEGREES
PH UR STRIP: 5.5 [PH] (ref 5–7)
PLATELET # BLD AUTO: 209 10E3/UL (ref 150–450)
POTASSIUM SERPL-SCNC: 4 MMOL/L (ref 3.4–5.3)
PR INTERVAL - MUSE: 314 MS
QRS DURATION - MUSE: 130 MS
QT - MUSE: 442 MS
QTC - MUSE: 555 MS
R AXIS - MUSE: 138 DEGREES
RBC # BLD AUTO: 3.43 10E6/UL (ref 4.4–5.9)
RBC URINE: <1 /HPF
SARS-COV-2 RNA RESP QL NAA+PROBE: NEGATIVE
SODIUM SERPL-SCNC: 138 MMOL/L (ref 136–145)
SP GR UR STRIP: 1.04 (ref 1–1.03)
SYSTOLIC BLOOD PRESSURE - MUSE: NORMAL MMHG
T AXIS - MUSE: 126 DEGREES
TRIGL SERPL-MCNC: 126 MG/DL
UROBILINOGEN UR STRIP-MCNC: NORMAL MG/DL
VENTRICULAR RATE- MUSE: 95 BPM
WBC # BLD AUTO: 4.9 10E3/UL (ref 4–11)
WBC URINE: <1 /HPF

## 2022-09-19 PROCEDURE — 214N000001 HC R&B CCU UMMC

## 2022-09-19 PROCEDURE — 250N000012 HC RX MED GY IP 250 OP 636 PS 637

## 2022-09-19 PROCEDURE — 83735 ASSAY OF MAGNESIUM: CPT

## 2022-09-19 PROCEDURE — 36415 COLL VENOUS BLD VENIPUNCTURE: CPT

## 2022-09-19 PROCEDURE — 93306 TTE W/DOPPLER COMPLETE: CPT | Mod: 26 | Performed by: INTERNAL MEDICINE

## 2022-09-19 PROCEDURE — 85025 COMPLETE CBC W/AUTO DIFF WBC: CPT

## 2022-09-19 PROCEDURE — 80061 LIPID PANEL: CPT

## 2022-09-19 PROCEDURE — 250N000013 HC RX MED GY IP 250 OP 250 PS 637

## 2022-09-19 PROCEDURE — 99223 1ST HOSP IP/OBS HIGH 75: CPT | Mod: 25 | Performed by: INTERNAL MEDICINE

## 2022-09-19 PROCEDURE — 81001 URINALYSIS AUTO W/SCOPE: CPT

## 2022-09-19 PROCEDURE — 93306 TTE W/DOPPLER COMPLETE: CPT

## 2022-09-19 PROCEDURE — 80048 BASIC METABOLIC PNL TOTAL CA: CPT

## 2022-09-19 PROCEDURE — U0003 INFECTIOUS AGENT DETECTION BY NUCLEIC ACID (DNA OR RNA); SEVERE ACUTE RESPIRATORY SYNDROME CORONAVIRUS 2 (SARS-COV-2) (CORONAVIRUS DISEASE [COVID-19]), AMPLIFIED PROBE TECHNIQUE, MAKING USE OF HIGH THROUGHPUT TECHNOLOGIES AS DESCRIBED BY CMS-2020-01-R: HCPCS

## 2022-09-19 PROCEDURE — 258N000003 HC RX IP 258 OP 636

## 2022-09-19 PROCEDURE — 93750 INTERROGATION VAD IN PERSON: CPT | Performed by: INTERNAL MEDICINE

## 2022-09-19 PROCEDURE — 250N000013 HC RX MED GY IP 250 OP 250 PS 637: Performed by: INTERNAL MEDICINE

## 2022-09-19 PROCEDURE — 85610 PROTHROMBIN TIME: CPT

## 2022-09-19 PROCEDURE — 99254 IP/OBS CNSLTJ NEW/EST MOD 60: CPT | Performed by: INTERNAL MEDICINE

## 2022-09-19 PROCEDURE — 96361 HYDRATE IV INFUSION ADD-ON: CPT | Performed by: EMERGENCY MEDICINE

## 2022-09-19 RX ORDER — VANCOMYCIN HYDROCHLORIDE 125 MG/1
125 CAPSULE ORAL 4 TIMES DAILY
Status: DISCONTINUED | OUTPATIENT
Start: 2022-09-19 | End: 2022-09-19

## 2022-09-19 RX ORDER — MULTIPLE VITAMINS W/ MINERALS TAB 9MG-400MCG
1 TAB ORAL DAILY
Status: DISCONTINUED | OUTPATIENT
Start: 2022-09-19 | End: 2022-09-29 | Stop reason: HOSPADM

## 2022-09-19 RX ORDER — WARFARIN SODIUM 7.5 MG/1
7.5 TABLET ORAL
Status: COMPLETED | OUTPATIENT
Start: 2022-09-19 | End: 2022-09-19

## 2022-09-19 RX ADMIN — SODIUM CHLORIDE, POTASSIUM CHLORIDE, SODIUM LACTATE AND CALCIUM CHLORIDE 500 ML: 600; 310; 30; 20 INJECTION, SOLUTION INTRAVENOUS at 12:53

## 2022-09-19 RX ADMIN — HYDRALAZINE HYDROCHLORIDE 50 MG: 50 TABLET, FILM COATED ORAL at 07:47

## 2022-09-19 RX ADMIN — VANCOMYCIN HYDROCHLORIDE 125 MG: 125 CAPSULE ORAL at 21:10

## 2022-09-19 RX ADMIN — VANCOMYCIN HYDROCHLORIDE 125 MG: 125 CAPSULE ORAL at 12:57

## 2022-09-19 RX ADMIN — HYDRALAZINE HYDROCHLORIDE 50 MG: 50 TABLET, FILM COATED ORAL at 21:10

## 2022-09-19 RX ADMIN — VANCOMYCIN HYDROCHLORIDE 125 MG: 125 CAPSULE ORAL at 07:49

## 2022-09-19 RX ADMIN — HYDROXYCHLOROQUINE SULFATE 200 MG: 200 TABLET, FILM COATED ORAL at 21:10

## 2022-09-19 RX ADMIN — Medication 150 MG: at 21:09

## 2022-09-19 RX ADMIN — Medication 1 TABLET: at 15:16

## 2022-09-19 RX ADMIN — LISINOPRIL 10 MG: 10 TABLET ORAL at 07:47

## 2022-09-19 RX ADMIN — TAMSULOSIN HYDROCHLORIDE 0.4 MG: 0.4 CAPSULE ORAL at 07:46

## 2022-09-19 RX ADMIN — ACETAMINOPHEN 975 MG: 325 TABLET, FILM COATED ORAL at 21:10

## 2022-09-19 RX ADMIN — HYDRALAZINE HYDROCHLORIDE 50 MG: 50 TABLET, FILM COATED ORAL at 13:00

## 2022-09-19 RX ADMIN — WARFARIN SODIUM 2.5 MG: 2.5 TABLET ORAL at 00:20

## 2022-09-19 RX ADMIN — QUETIAPINE 25 MG: 25 TABLET ORAL at 12:57

## 2022-09-19 RX ADMIN — PANTOPRAZOLE SODIUM 40 MG: 40 TABLET, DELAYED RELEASE ORAL at 07:46

## 2022-09-19 RX ADMIN — ACETAMINOPHEN 975 MG: 325 TABLET, FILM COATED ORAL at 13:00

## 2022-09-19 RX ADMIN — QUETIAPINE 25 MG: 25 TABLET ORAL at 07:49

## 2022-09-19 RX ADMIN — HYDROXYCHLOROQUINE SULFATE 200 MG: 200 TABLET, FILM COATED ORAL at 07:48

## 2022-09-19 RX ADMIN — DIGOXIN 125 MCG: 125 TABLET ORAL at 07:48

## 2022-09-19 RX ADMIN — SALINE NASAL SPRAY 2 SPRAY: 1.5 SOLUTION NASAL at 18:57

## 2022-09-19 RX ADMIN — SALINE NASAL SPRAY 2 SPRAY: 1.5 SOLUTION NASAL at 21:11

## 2022-09-19 RX ADMIN — ACETAMINOPHEN 975 MG: 325 TABLET, FILM COATED ORAL at 07:46

## 2022-09-19 RX ADMIN — WARFARIN SODIUM 7.5 MG: 7.5 TABLET ORAL at 18:56

## 2022-09-19 RX ADMIN — Medication 3 MG: at 21:10

## 2022-09-19 RX ADMIN — ATORVASTATIN CALCIUM 40 MG: 40 TABLET, FILM COATED ORAL at 21:10

## 2022-09-19 RX ADMIN — MYCOPHENOLATE MOFETIL 1500 MG: 500 TABLET, FILM COATED ORAL at 07:48

## 2022-09-19 RX ADMIN — VANCOMYCIN HYDROCHLORIDE 125 MG: 125 CAPSULE ORAL at 15:16

## 2022-09-19 RX ADMIN — THIAMINE HCL TAB 100 MG 100 MG: 100 TAB at 07:47

## 2022-09-19 RX ADMIN — SERTRALINE HYDROCHLORIDE 100 MG: 100 TABLET ORAL at 07:46

## 2022-09-19 ASSESSMENT — ACTIVITIES OF DAILY LIVING (ADL)
ADLS_ACUITY_SCORE: 39
ADLS_ACUITY_SCORE: 30
ADLS_ACUITY_SCORE: 30
ADLS_ACUITY_SCORE: 39
ADLS_ACUITY_SCORE: 41
ADLS_ACUITY_SCORE: 30

## 2022-09-19 ASSESSMENT — PULMONARY FUNCTION TESTS: FEV1 (%PREDICTED): 2

## 2022-09-19 ASSESSMENT — NEW YORK HEART ASSOCIATION (NYHA) CLASSIFICATION: NYHA FUNCTIONAL CLASS: IV

## 2022-09-19 NOTE — PROGRESS NOTES
[facility]    Cardiology Progress Note- Cardiology 2        Date of Admission:  2022     Assessment & Plan: HVSL   Dandy EDUARD Sands is a 61 year old male with a PMHx of SLE, antiphospholipid syndrome, pulmonary embolism (on warfarin), ICM (EF 10-15%) s/p HM3 LVAD (22), and C. Diff infection (on PO vancomycin taper) who is currently admitted 2022 for low flow alarms and ongoing profuse diarrhea. Continuing po vancomycin, administering fluids as needed.    Changes today :   - s/p 500cc  - LVAD speed reduced from 5200 to 5100  - ID consult- awaiting recs  - Curb-sided Rheum- holding mycophenolate in the setting of infection    Diarrhea  C. Diff infection (positive 2022)  Prior to admit patient was on a 14-day course of 125 mg PO vancomycin qid which was set to go through 22. Patient denies any missed doses. He states he initially improved with PO vancomycin, but over the past day prior to admission his symptoms have been worsening (3 episodes of non-bloody diarrhea per day, low energy, lack of appetite). CT A/P in the ED negative for toxic megacolon.  - ID consult, appreciate recs  - s/p 500cc LR in the ED, 500cc LR after admission  - encouraged po fluid intake  - continue pta vancomycin 125 mg QID (14-day course set to  after 2022)  - curb-sided Rheum- holding mycophenolate in the setting of infection, can reach out to Rheum closer to discharge to discuss how long to hold mycophenolate    Stage D HFrEF 2/2 ICM (EF 10-15%) s/p HM3 LVAD (22) and CRT-D ('06)  LVAD low flow events  CAD s/p PCI to LAD ('05)  - LVAD: LVAD speed reduced from 5200 to 5100, flow 3s, power 3s, PI 5-7  - Volume status: Mildly hypovolemic (weight on 9/15 discharge 134 lbs, weight this admit 132 lbs, no JVD)  - GDMT   > BB: Discontinued during previous admission with cardiogenic shock   > ACEi/ARB/ARNi: PTA lisinopril 10 mg daily   > MRA: None   >  SGLT2i: None  - SCD ppx: s/p CRT-D (2006)  - AC: Pharmacy to dose warfarin   > on 5 mg daily at home (INR goal 2-3)  - PTA digoxin 125 mcg daily  - PTA atorvastatin 40 mg daily  - Telemetry monitoring  - 2 g sodium, <2 L fluid restriction diet  - Strict I/O  - Daily weights    HTN  - PTA hydralazine 50 mg TID  - PTA lisinopril 10 mg daily    MIGUEL  - PTA seroquel 25 mg QAM, 25 mg at noon, 150 mg QPM  - PTA sertraline 100 mg daily    GERD  Dysphagia  - PTA pantoprazole 40mg daily    SLE  APS  Hx of DVT/PE  On warfarin PTA (INR goal 2-3). Decreased to 5 mg daily during last admission due to mucus membrane bleeding.  - Pharmacy to dose warfarin  - PTA hydroxychloroquine 200mg daily  - HOLDING PTA mycophenolate 1500mg bid in the setting of infection (discussed this with Rheum as above)    Risk for malnutrition   - Nutrition consult  - PT/OT consult  - PTA thiamine 100 mg    BPH  - PTA tamsulosin 0.4mg daily    Mild-moderate emphysema  - Not currently using any PTA inhalers      Diet:  2g Na diet, 2L fluid restriction  DVT Prophylaxis: Warfarin  Fitzgerald Catheter: Not present  Code Status:  Full code       Disposition Plan   Expected discharge: 1-3 days, recommended to prior living arrangement once diarrhea improvement.      Entered: Austin Can MD 09/19/2022, 9:13 AM       The patient's care was discussed with the Attending Physician, Dr. Lawrence.    Austin Can MD  [facility]    ______________________________________________________________________    Interval History   See EDITH&JONATHAN HART. Reports that before yesterday, he had been having 1-2 episodes of diarrhea per day. Prior to admission, had 3 episodes per day and lack of appetite and lack of energy. Has some LUQ abdominal pain with movement which he attributes to his LVAD. No fevers, chills, chest pain, shortness of breath, dysuria. No blood in stool. Has some lightheadedness when getting up, no falls or LOC.    Had 1  low flow alarm this morning.     Data reviewed today: I reviewed all medications, new labs and imaging results over the last 24 hours.     Physical Exam   Vital Signs: Temp: 97.4  F (36.3  C) Temp src: Oral BP: 100/76 Pulse: 89   Resp: 26 SpO2: 96 % O2 Device: None (Room air)    Weight: 132 lbs 0 oz  General: lying in bed, NAD  HEENT: no scleral icterus or conjunctival injection, oropharynx clear  Resp: clear to auscultation bilaterally, no crackles or wheezes  Cardiac: LVAD hum. JVD at base of neck lying at 20 degrees.  Abdomen: soft, non-tender, non-distended. No rebound or guarding. Driveline site with gauze without swelling or tenderness.   Extremities: warm, no lower extremity edema  MSK: cachetic   Skin: no lesions on exposed skin  Neuro: alert, answers questions appropriately  Psych: appropriate mood and affect    Data

## 2022-09-19 NOTE — TELEPHONE ENCOUNTER
VAD Multidisciplinary Review  Multidisciplinary review date: 7/1/22  Inclusion Criteria  Discussed VAD with patient and/or decision makers. Reviewed anticipated survival benefit, anticipated functional improvement, risks, and benefits. Patient and/or decision maker verbalize understanding and agree to VAD implant?: yes   VAD is elective procedure?: Yes   Urgent VAD Implant Protocol followed?: N/A  Patient was approved for transplant - and deteriorating while waiting and therefore moving for LVAD  NYHA class: IV  INTERMACS score: 2  Patient has: failed to respond to optimal medical management for at least 45 of the last 60 days  Cardiopulmonary stress testing completed?: None related to acute illness  LVEF is: < 25%  Exclusion Criteria  Has condition, other than heart failure, which would limit survival to < 2 years?: No  Cardiomyopathy with restrictive physiology, unless severe LV systolic failure and LV dilation present?: No  Technical obstacles that pose and inordinately high surgical risk in the judgment of the MCS surgeon?: No  Active systemic infection? (unless ALL of following criteria met: negative blood cultures x 2 days, antibiotic treatment x 7 days and Infectious Disease clearance pre-operatively): No  Presence of active malignancy AND life expectancy < 2 years?: No  Stroke within the last six weeks, unless cleared by neurology team: No  Diagnosed with severe, irreversible pulmonary disease (supplemental O2 usage, FEV1 < 50% predicted): No  Pulmonary hypertension as a primary diagnosis: No  Chronic dialysis: No  Evidence of significant peripheral vascular disease accompanied by resting leg pain or ulceration unless treatment plan is in place: No  Carotid artery disease (>80% extracranial stenosis on Doppler) that could result in an adverse neurological event if left untreated: No  Evidence of an untreated abdominal aortic aneurysm >5 cm as measured by abdominal ultrasounds within the last 180 days unless  treatment plan in place: No  Severe or irreversible hepatic disease, evidence of shock liver, and/o biopsy-proven cirrhosis: No  Active contraindication to anticoagulation, INR > 2.5 in absence of concurrent anticoagulation therapy, platelet < 50,000, history of intolerance to anticoagulation, or other coagulopathy unless Hematology consulted and plan is in place: No  Acute valvular infective endocarditis with bacteremia: No  Neuromuscular disease, psychiatric diagnosis, dementia or other process that severely compromises ability to use and care for external system components or to ambulate/exercise: No  Inability to understand the procedure, risk involved, or to comply with follow-up evaluation by VAD team: No  For menstruating females: Current pregnancy or unable/unwilling to follow contraception plan: Not applicable  Relative Contraindications  Alcohol and/or recreational drug abuse within past six months: No  Significant history of depression or other mental illness, refractory to therapy or thought to be a risk to successful outcome with MCS, as per assessment of trained professional: No  Demonstrated on-going non-compliance with demonstrated inability to comply with medical recommendations on multiple occasions: No  Unsafe living environment or lack of adequate support system: No  Lack of adequate insurance coverage or waiver by hospital administration: No  Evidence of other ongoing physical, financial, or psychosocial issues that will preclude the intended goal of safety and improvements in quality and duration of life, unless a plan for resolution and support is in process: No  Multidisciplinary Decision  Decision: Approved    Implant as: Bridge to Transplant  Listing status: 2 (Adult)

## 2022-09-19 NOTE — PROGRESS NOTES
CLINICAL NUTRITION SERVICES - ASSESSMENT NOTE     Nutrition Prescription    RECOMMENDATIONS FOR MDs/PROVIDERS TO ORDER:  Recommend liberalize diet to 3 g Na+ to allow pt more flexibility for meal ordering and improved PO    Malnutrition Status:    Severe malnutrition in the context of acute on chronic illness    Recommendations already ordered by Registered Dietitian (RD):  Supplements:  - Special K bar x 2 @ 2PM  - Additional supplements by request, PRN    1 tablet Thera-vit-M daily to support micronutrient needs    Future/Additional Recommendations:  Monitor PO and wt trends.      REASON FOR ASSESSMENT  Dandy Sands is a/an 61 year old male assessed by the dietitian for Provider Order - poor appetite and PO intake & Congestive Heart Failure (CHF) - Dietitian to instruct patient on 2 gram sodium diet (2 separate consults)    NUTRITION HISTORY  Pt known to Clinical Nutrition, most recently during previous hospitalization for snack and supplements to support PO. Pt has received low sodium diet education multiple times in the past.    No food allergies noted.    Per pt, he's had low appetite, poor PO, and diarrhea over past ~10 days. He shares symptoms had seemed to improve a few days ago, but then diarrhea came back and was worse. Kept visit brief as other providers needed to visit with patient.    CURRENT NUTRITION ORDERS  Diet: 2 g Sodium + 2000 mL fluid restriction  Intake/Tolerance: Pt reports no appetite. He hasn't yet ordered a meal today. Writer offered to place food order for pt, but he politely declined. He would like Special K protein bars. He declines nutrition drinks at this time, stating he much prefers the protein bars.     LABS  Labs reviewed  Electrolytes  Potassium (mmol/L)   Date Value   09/18/2022 4.6   09/15/2022 4.1   09/14/2022 4.2   09/14/2022 4.4   09/01/2022 3.9   08/29/2022 3.9   08/27/2022 3.8   07/09/2022 5.2     Phosphorus (mg/dL)   Date Value   09/04/2022 3.8   08/21/2022 4.3  "  08/20/2022 4.4   08/14/2022 3.8   08/13/2022 4.0    Blood Glucose  Glucose (mg/dL)   Date Value   09/18/2022 87   09/14/2022 88   09/13/2022 90   09/12/2022 108 (H)   09/11/2022 98   09/01/2022 94   08/29/2022 104 (H)   08/27/2022 101 (H)   08/22/2022 97   07/04/2022 116 (H)     GLUCOSE BY METER POCT (mg/dL)   Date Value   08/11/2022 105 (H)   08/11/2022 88   08/11/2022 106 (H)   08/11/2022 87   08/10/2022 115 (H)     Hemoglobin A1C (%)   Date Value   05/20/2022 5.5    Inflammatory Markers  CRP Inflammation (mg/L)   Date Value   09/18/2022 9.97 (H)   09/04/2022 13.50 (H)   08/03/2022 141.00 (H)   06/19/2022 28.0 (H)     WBC Count (10e3/uL)   Date Value   09/18/2022 5.2   09/15/2022 4.6   09/13/2022 5.6     Albumin (g/dL)   Date Value   09/18/2022 3.9   09/06/2022 4.1   09/04/2022 3.8   09/01/2022 3.1 (L)   08/29/2022 3.2 (L)   06/21/2022 2.8 (L)      Magnesium (mg/dL)   Date Value   09/15/2022 2.0   09/14/2022 1.7   09/14/2022 1.6 (L)     Sodium (mmol/L)   Date Value   09/18/2022 136   09/14/2022 142   09/13/2022 139    Renal  Urea Nitrogen (mg/dL)   Date Value   09/18/2022 13.1   09/14/2022 11.1   09/13/2022 9.1   09/01/2022 13   08/29/2022 14   08/27/2022 11     Creatinine (mg/dL)   Date Value   09/18/2022 0.68   09/14/2022 0.62 (L)   09/13/2022 0.63 (L)     Additional  Triglycerides (mg/dL)   Date Value   07/21/2022 110   07/11/2022 74   06/18/2022 53     Ketones Urine (mg/dL)   Date Value   09/19/2022 Negative        MEDICATIONS  Medications reviewed  -Thiamine    ANTHROPOMETRICS  Height: 170.2 cm (5' 7\")  Most Recent Weight: 59.9 kg (132 lb)    IBW: 67.3 kg   BMI: 20.67 kg/m2; Normal BMI  Weight History: 8.7% wt loss over past ~3 months, suspect confounded by fluid d/t HF, LVAD implantation surgery  Wt Readings from Last 20 Encounters:   09/18/22 59.9 kg (132 lb)   09/15/22 61.2 kg (134 lb 14.7 oz)   09/04/22 59.1 kg (130 lb 6.4 oz)   08/21/22 59.3 kg (130 lb 12.8 oz)   07/08/22 62.6 kg (138 lb)   06/30/22 " 65.8 kg (145 lb)   05/29/22 65 kg (143 lb 4.8 oz)   05/26/22 64.6 kg (142 lb 8 oz)   Dosing Weight: 60 kg (actual, based on admit wt of 59.9 kg on 9/18)    ASSESSED NUTRITION NEEDS  Estimated Energy Needs: 2522-2555 kcals/day (25 - 30 kcals/kg)  Justification: Maintenance  Estimated Protein Needs: 72-90 grams protein/day (1.2 - 1.5 grams of pro/kg)  Justification: Hypercatabolism with acute illness  Estimated Fluid Needs: 2000 mL/day    Justification: On a fluid restriction    PHYSICAL FINDINGS  See malnutrition section below.    MALNUTRITION  % Intake: < 75% for > 7 days (moderate)  % Weight Loss: > 7.5% in 3 months (severe) - suspect confounded by fluid losses with diuresis, but also true wt loss  Subcutaneous Fat Loss: Facial region:  Moderate, Upper arm:  Moderate and Lower arm:  Moderate (at minimum) - per visual   Muscle Loss: Temporal:  Mild, Thoracic region (clavicle, acromium bone, deltoid, trapezius, pectoral):  Severe, Upper arm (bicep, tricep):  Severe and Lower arm  (forearm):  Severe - per visual, deferred formal examination d/t other providers needing to visit with patient  Fluid Accumulation/Edema: None noted per H&P  Malnutrition Diagnosis: Severe malnutrition in the context of acute on chronic illness    NUTRITION DIAGNOSIS  Inadequate oral intake related to poor appetite in setting of C difficile infection as evidenced by pt report of eating poorly, wt loss of ~8.7% over past ~3 months, and signs of subcutaneous adipose tissue and muscle wasting per visual exam.    INTERVENTIONS  Implementation  -Nutrition Education: Provided education on RD role. Encouraged trying to eat something every few hours to support PO, even if not hungry. Encouraged used of Special K bars which pt is agreeable to.   -Medical food supplement therapy  -Multivitamin/mineral supplement therapy     Goals  Patient to consume % of nutritionally adequate meal trays TID, or the equivalent with supplements/snacks.      Monitoring/Evaluation  Progress toward goals will be monitored and evaluated per protocol.    Erica Maguire RD, LD  Emergency Department/Weekend Pager: [phone]

## 2022-09-19 NOTE — TELEPHONE ENCOUNTER
Called patricia back and left voicemail with my contact information.    Alia Littlejohn RN BSN   VAD Coordinator   Office: [phone]  24/7 On-Call VAD Pager: [phone] opt 4, ask to page VAD coordinator on call

## 2022-09-19 NOTE — PROGRESS NOTES
Offered to change LVAd dressing x 2, patient declined. Patient says son usually changes his  Dressing at night    LITA Norton

## 2022-09-19 NOTE — ED NOTES
Patients flow dropped to 1.7 - 2.0 when he sat up to take his morning meds. Recovered when he laid back down.     UPDATE:  Patient normal flow setting 3.4. daily dressing change, dressing CDI, will have to be change today.     1220  attending team ordered 500 ml LR, BOLUS  1220. Pt up to BSC,  LVAD flow   dropped to 1.9. went up to 3 9 hours back in bed. Pt said this LVAD alarm has been an ongoing issue even at home.

## 2022-09-19 NOTE — PROGRESS NOTES
Indication of Interrogation:  VAD alarms    Type of VAD:  Heartmate 3    Current Parameters:  Flow= 3.5 lpm, Speed= 5100 rpm, Power= 3.5 persaud, PI (if applicable)= 4.6    Abnormal Alarm on History:  Yes, explain: pt with 2 low flow alarms today, 2 yesterday. Pt able to demonstrate low flow alarm by sitting up, flow dropped to 2.4 and PI increased to 7    Abnormal Events/Parameters Notes:  Yes, explain: several PI events. PI ranging 3.5-7's.     Changes Made during Interrogation:  Yes, explain: reviewed findings with Dr. Lawrence at bedside. Speed decreased to 5100rpm's.

## 2022-09-19 NOTE — PROGRESS NOTES
Additional hospital problems    Severe Malnutrition  Other (please specify)        Clinical Indicators found within the medical record:        RD consult:  Nutrition Prescription     RECOMMENDATIONS FOR MDs/PROVIDERS TO ORDER:  Recommend liberalizing diet order to optimize nutrition, as able. Would suggest at least liberalizing to 3 gm sodium diet.      Malnutrition Status:    Severe malnutrition in the context of chronic illness      Recommendations already ordered by Registered Dietitian (RD):  - Special K bar x 2 at 10 AM  - Additional supplements by request, PRN  - Remind patient to schedule breakfast for next day when taking dinner orders, per need to consume food with medications (discussed with nutrition staff)      Future/Additional Recommendations:  Monitor/encourage PO intake, TID meal frequency, adjust ONS/snacks if desired  Monitor wt trends for stability and overall POC

## 2022-09-19 NOTE — PHARMACY-ANTICOAGULATION SERVICE
Clinical Pharmacy - Warfarin Dosing Consult     Pharmacy has been consulted to manage this patient s warfarin therapy.  Indication: LVAD/RVAD  Therapy Goal: INR 2-3  OP Anticoag Clinic: [facility]  Warfarin Prior to Admission: Yes  Warfarin PTA Regimen: Was recently discharged and directed to take 5 mg daily, has spent large amount of time in hospital over last several months (see MAR for doses). Last week of hospitalization, required average of 6.4 mg/week.   Significant drug interactions:    Increased bleeding risk: sertraline    INR   Date Value Ref Range Status   09/18/2022 1.56 (H) 0.85 - 1.15 Final   09/15/2022 2.30 (H) 0.85 - 1.15 Final     Factor 10 Chromogenic   Date Value Ref Range Status   08/23/2022 24 (L) 70 - 130 % Final       Patient already took 5mg dose of Warfarin this AM but is not at goal INR of 2-3.  Recommend an additional 2.5 mg of Warfarin today (To make a 7.5mg dose total).  Pharmacy will monitor Dandy Sands daily and order warfarin doses to achieve specified goal.      Please contact pharmacy as soon as possible if the warfarin needs to be held for a procedure or if the warfarin goals change.      Tima Doshi, [facility]  Pharmacy #: [id number]

## 2022-09-19 NOTE — PROGRESS NOTES
VAD Coordinator on-call spoke to pt's bedside RN regarding his low flow alarms. Instructed bedside RN to page cards 2 team to notify providers about low flow alarms and obtain new orders if needed. Bedside RN verbalized understanding. VAD Coordinator on-call available for any further questions or concerns.

## 2022-09-19 NOTE — PLAN OF CARE
Goal Outcome Evaluation:    Plan of Care Reviewed With: patient     Overall Patient Progress: no change    Outcome Evaluation: Pt with poor appetite, wt loss. Pt is willing to take Special K Protein bars.

## 2022-09-19 NOTE — CONSULTS
Post LVAD Patient Social Work Assessment     Patient Name: Dandy Sands  : 1960  Age: 61 year old  MRN: [id number]  Date of LVAD: 2022 after admission to the hospital middle of     Patient known to me from follow up in the LVAD program.  Admitted on 2022 for ongoing diarrhea, weakness, lightheadedness and dizziness with low-flow alams.  Seen today to update assessment. Was just hospitalized -9/15 and discharged to local apartment ([facility]) with Son and Dtr taking turns providing 24-hour caregiver support.    Presenting Information   Living Situation: Normally, lives alone in own home, but has struggled with advanced heart failure since May of 2022 and been inpatient since end of May/beginng . Now, family is in a local apartment, but patient has only been there for a week and has had to be re-hospitalized X2    Functional Status: Per family, patient weak. Isn't willing to do much on his own. Stays in bed and doesn't want to come out of his room. Per patient, having the ongoing diarrhea has made it difficult to eat or drink anything and then gets dehydrated, causing low-flow alarms and lightheadedness and dizziness.  Cultural/Language/Spiritual Considerations: None    Support System  Primary Support Person Son and Dtr-Edwin  Other support:  None  Plan for support in immediate post-hospital period: Back to apartment when medically stable or another stay at rehab. Family worried about patient's inability to progress to independence. Worried that he will not be independent enough to return to [place] and live alone at the rate things are happening here.    Health Care Directive  Decision Maker: patient able to make own decisions  Alternate Decision Maker: Son and Dtr  Health Care Directive: Copy in Chart    Mental Health/Coping:   History of Mental Health: No history of mental health, but Dtr reported to writer today that patient has been making a lot of negative  self-talk comments.   History of Chemical Health: No concerns with chemical health  Current status: N/A  Coping: Patient voices frustration with lack of progress and ongoing diarrhea  Services Needed/Recommended: May need health psychology and psychiatry. Will address with the team.    Financial   Income: Using personal shelter/401K money to pay for monthly insurance premiums. No income as patient unable to work.  Impact of LVAD on income: Minimal-patient already unable to work due to heart failure, but most likely won't be able to return.  Insurance and medication coverage: Private insurance through employer  Financial concerns: Yes-family paying for local lodging. Sourav assistance applied. May need to apply for additional sourav assistance.  Resources needed: Additional sourav assistance    Discharge Plan   Patient and family discharge goal: Per patient, will want to go home(local apartment), but family expressing concerns over the level of caregiver support needed to safely take care of Dandy at the apartment.  Barriers to discharge: Medical condition    Education provided by SW: Social Work role inpatient setting, availability of support groups, additional sourav assistance, lack of resources to help provide caregiver support in the local apartment    Assessment and recommendations and plan:  Writer will continue to follow for ongoing psychosocial support and assistance with discharge planning. If patient not safe to return to the apartment, will need [facility]. Per family, patient not using walker as recommended. Not drinking or eating enough, but could be a result of having ongoing diarrhea. Patient does endorse lightheadedness and dizziness, but doesn't utilize his walker.

## 2022-09-19 NOTE — CONSULTS
[facility] CONSULTATION     Patient:  Dandy Sands   Date of birth 1960, Medical record number [id number]  Date of Visit:  09/19/2022  Date of Admission: 9/18/2022  Consult Requester:Maria Elena Lawrence MD    Physician Attestation   I, María Reese MD, was present with the medical/LAURA student who participated in the service and in the documentation of the note.  I have verified the history and personally performed the physical exam and medical decision making.  I agree with the assessment and plan of care as documented in the note.      I personally reviewed vital signs, medications, labs and imaging.    Please see PA student note below, as edited by me, for full details of history and plan. Briefly, 62 y/o male with PMH HFrEF s/p LVAD (7/2022), SLE on HCQ, PE and antiphospholipid syndrome on chronic warfarin, and recently diagnosed C difficile colitis, currently on treatment, who presented with hypotension (in the form of LVAD alarms) and subjectively worsening diarrhea. Primary team concerned for failure of C difficile treatment. With current vancomycin therapy (pt is adherent), no leukocytosis, no abdominal pain, no fevers, and no concerning findings on CT imaging, my suspicion for C difficile being the etiology of his current loose stools is extremely low. It does sound like he had a true infection during his last admission and had initial improvement with PO vancomycin. Failure of therapy with vancomycin is virtually nonexistent, unless the patient has fulminant colitis, which is certainly not the clinical picture we are seeing here. At this point, I suspect that his loose stools are 2/2 dysbiosis from PO vancomycin treatment vs post-infectious IBS vs post-infectious lactose intolerance (lower suspicion as symptoms not directly correlated with dairy intake) vs medication-induced diarrhea (ie microscopic colitis). In talking with him, he dates his diarrhea back to when he  "\"started all of these medications 2 years ago.\" This has certainly been a chronic issue for him, though the urgency of his stools has changed. He has no recent sick contacts, unusual exposures, or constitutional symptoms that are concerning for an alternative infectious etiology. Recommend continuing non-infectious workup for causes, and would specifically focus on medication and diet induced given his history.     Thank you for the consult, ID will sign off at this time. Please page with questions/concerns.     María Reese MD  Infectious Disease Staff  Pager [phone]    Date of Service (when I saw the patient): 09/19/22            Assessment and Recommendations:   ASSESSMENT:  1. Diarrhea, worsening on PO vancomycin  2. C diff infection, non-severe, +B Toxin PCR 9/7/2022, PO vanco initiated 9/8/2022, end date 9/21/2022   3. HFrEF 2/2 ICM s/p LVAD (7/2022)  4. CAD s/p PCI to LAD (2005)  5. PE and ALPS on warfarin  6. SLE on Hydroxychloroquine  7. Emphysema    DISCUSSION:   61-year-old male with PMH of HFrEF 2/2 ICM, s/p HM3 LVAD (7/2022), PE and Antiphospholipid syndrome on warfarin, and SLE on hydroxychlorquine presented to the ER on 9/18 after discussion with his LVAD coordinator with concerns of several low flow alarms from his LVAD and continued diarrhea despite current PO vancomycin therapy.    Patient is afebrile, hemodynamically stable, normal WBC. Abdominal CT (9/18/22) without evidence of colitis. CRP mildly elevated but improved from 2 weeks ago (has history of SLE). UA unremarkable for infection. No other infectious workup initiated. Does have +PCR C. Diff on 9/7/22. Negative O&P and stool pathogen panel on 9/11/2022.     Reports decreased appetite, fatigue, and experiencing lightheadedness upon sitting up or standing. Patient had 1 non bloody water diarrhea today (9/19). Denies abdominal pain or cramping and no tenderness on my exam.    Still on treatment for C. Diff infection diagnosed 9/7. " ID recommended 10 day course on previous consultation. This was extended to 14 days by primary/TCU. This would be through 9/21/22. ID suspicion for recurrent C. diff on treatment is extremely low given lack of abdominal symptoms and on present therapy. We suggest considering infectious/non infectious etiologies of diarrhea. Consider nutritional consult for diet assessment. Would not prolong PO vanco therapy as this would have no benefit and could contribute to changes in gut nimo.    RECOMMENDATION:  1. Complete PO vancomycin treatment. End date 9/21/2022.  2. Consider other non infectious etiologies of diarrhea  3. Consider nutrition consult.    Thank you for this consult. ID will sign off at this time.     Patient was discussed with Dr. Reese.     Cherelle Way, MPH, PA-S  Infectious Disease  Pager # [phone]  ________________________________________________________________    Consult Question:Patient with C diff infection on PO vancomycin, completing 14-day course on 9/21/22 but having worsening symptoms despite being near end of taper. Please assist with C diff management..  Admission Diagnosis: C. difficile diarrhea [A04.72]         History of Present Illness:   61-year-old male with PMH of HFrEF 2/2 ICM, s/p HM3 LVAD (7/2022), prior low flow events, CAD s/p PCI, s/pCRT-D (2006), emphysema, recurrent epistaxis episodes with subsequent blood loss anemia, deconditioning, GERD, dysphagia, PE and Antiphospholipid syndrome on warfarin, and SLE on hydroxychlorquine presented to the ER on 9/18 after discussion with his LVAD coordinator with concerns over several low flow alarms from his LVAD and continued non bloody diarrhea despite current PO vancomycin treatment.     Patient had a prolonged hospitalization 6/17/2022-8/21/2022 for decompensated HF c/b cardiogenic shock s/p LVAD placement 7/8/2022 followed by a stay at acute rehab unit 8/21/2022-9/4/2022. He returned to the ED 9/5/2022 for epistaxis and diarrhea. He  reported developing watery diarrhea around 9/4/2022. He was having at least 3 liquid BMs daily. During that time he remained afebrile with an isolated low grade fever of 100.2 on 9/9. Patient remained hemodynamically stable with normal WBC. Patient was without abdominal pain at that time. He tested positive for C diff 9/7/2022 and was started on vancomycin PO 9/8/2022. He was discharged on a 14 day total PO vanco course to end on 9/21/22. His diarrhea had improved, although never resolving, until 9/17. He began to have 3 liquid BMs daily, decreased appetite, fatigue, and experienced lightheadedness upon sitting up or standing.     Patient reports he has had several years of diarrhea per day. It is only since 9/4 where his stool is completely liquid. He is experiencing urgency and cannot always make to the toilet in time and now wears diapers due to these accidents. Patient had 1 non bloody water diarrhea today (9/19). Continues to have decreased appetite.     Patient is from [place]. Currently living with his son and daughter-in-law in an apartment. Due to go home to [place] in ~ 3 weeks. He is a former . Has 1 cat. His hobbies include camping with his camper. He is an active outdoorsmen prior this recent illness.         Review of Systems:   CONSTITUTIONAL:  No fevers or chills  EYES: negative for icterus  ENT:  negative for hearing loss, tinnitus and sore throat  RESPIRATORY:  negative for cough with sputum and dyspnea  CARDIOVASCULAR:  negative for chest pain, dyspnea  GASTROINTESTINAL:  + intermittent nausea, +diarrhea, negative for vomiting  GENITOURINARY:  negative for dysuria  INTEGUMENT:  negative for rash and pruritus  NEURO:  Negative for headache.   PSYCH: He is frustrated at repeat hospitalizations and wants to go home.         Past Medical History:     Past Medical History:   Diagnosis Date     Antiphospholipid antibody syndrome (H)      CAD (coronary artery disease)       Chronic systolic heart failure (H)      Ischemic cardiomyopathy      Mitral regurgitation      Systemic lupus erythematosus (H)             Past Surgical History:     Past Surgical History:   Procedure Laterality Date     COLONOSCOPY N/A 05/23/2022    Procedure: COLONOSCOPY;  Surgeon: Parth Meneses MD;  Location: [facility]     CV CORONARY ANGIOGRAM N/A 05/24/2022    Procedure: Coronary Angiogram;  Surgeon: Mike Pope MD;  Location: [facility]     CV CORONARY ANGIOGRAM N/A 05/29/2022    Procedure: Coronary Angiogram;  Surgeon: Austin Bright MD;  Location: [facility]     CV CORONARY LITHOTRIPSY PCI Bilateral 05/24/2022    Procedure: Percutaneous Coronary Intervention - Lithotripsy;  Surgeon: Mike Pope MD;  Location: [facility]     CV INTRA AORTIC BALLOON N/A 06/17/2022    Procedure: Intraprocedure Aortic Balloon Pump Insertion;  Surgeon: Sherman Cerda MD;  Location: [facility]     CV INTRA AORTIC BALLOON Right 07/03/2022    Procedure: Reposition of Subclavian Intraprocedure Aortic Balloon Pump;  Surgeon: Austin Bright MD;  [facility]     CV INTRAVASULAR ULTRASOUND N/A 05/24/2022    Procedure: Intravascular Ultrasound;  Surgeon: Mike Pope MD;  [facility]     CV PCI ANGIOPLASTY N/A 05/29/2022    Procedure: Percutaneous Transluminal Angioplasty;  Surgeon: Austin Bright MD;  [facility]     CV PCI STENT DRUG ELUTING N/A 05/24/2022    Procedure: Percutaneous Coronary Intervention Stent;  Surgeon: Mike Pope MD;  Location[facility]     CV RIGHT HEART CATH MEASUREMENTS RECORDED N/A 05/18/2022    Procedure: Right Heart Catheterization;  Surgeon: Justo Stewart MD;  [facility]     CV RIGHT HEART CATH MEASUREMENTS RECORDED N/A 05/24/2022    Procedure: Right Heart Catheterization;  Surgeon:  Mike Pope MD;  Location:  [facility] CATH [facility]     CV RIGHT HEART CATH MEASUREMENTS RECORDED N/A 05/29/2022    Procedure: Right Heart Catheterization;  Surgeon: Austin Bright MD;  Location:  [facility] CATH [facility]     CV RIGHT HEART CATH MEASUREMENTS RECORDED N/A 07/01/2022    Procedure: Right Heart Catheterization;  Surgeon: Mike Pope MD;  Location:  [facility]ATH [facility]     CV RIGHT HEART EXERCISE STRESS STUDY N/A 05/18/2022    Procedure: Stress Drug Study;  Surgeon: Justo Stewart MD;  Location:  HE[facility] CARD[facility] CATH LAB     CV SWAN CHOCO PROCEDURE N/A 06/17/2022    Procedure: [place] Procedure;  Surgeon: Sherman Cerda MD;  Location:  HEART CARDIAC CATH LAB     INCISION AND DRAINAGE CHEST WASHOUT, COMBINED N/A 07/11/2022    Procedure: Chest washout and closure;  Surgeon: Darnell Morgan MD;  [facility]     INCISION AND DRAINAGE CHEST WASHOUT, COMBINED N/A 07/12/2022    Procedure: INCISION AND DRAINAGE, WOUND, CHEST, WITH IRRIGATION;  Surgeon: Darius Zamora MD;  Location: [facility]     INSERT ARTERIAL LINE  07/18/2022          INSERT INTRAAORTIC BALLOON PUMP Right 06/23/2022    Procedure: INSERTION, INTRA-AORTIC BALLOON PUMP RIGHT SUBCLAVIAN ARTERY.;  Surgeon: Hayden Veras MD;  Location: [facility]     INSERT VENTRICULAR ASSIST DEVICE LEFT (HEARTMATE II/III) MINIMALLY INVASIVE N/A 07/08/2022    Procedure: MEDIAN STERNOTOMY.  CARDIOPULMONARY BYPASS.  INTRAOPERATIVE TRANSESOPHAGEAL ECHOCARDIOGRAM.  IMPLANT LEFT VENTRICULAR ASSIST DEVICE HEARTMATE III.  PLACEMENT OF PERCUTANEOUS RIGHT VENTRICULAR ASSIST DEVICE.  TEMPORARY CHEST CLOSURE;  Surgeon: Darius Zamora MD;  Location: [facility]     IR CHEST TUBE PLACEMENT NON-TUNNELED RIGHT  08/01/2022     IRRIGATION AND DEBRIDEMENT CHEST WASHOUT, COMBINED N/A 07/13/2022    Procedure: IRRIGATION AND DEBRIDEMENT, CHEST;  Surgeon: Darius Zamora MD;  Location: [facility]     MIDLINE DOUBLE LUMEN PLACEMENT Left  "09/11/2022    Mid line ok to use     MIDLINE DOUBLE LUMEN PLACEMENT Right 09/14/2022    5FR DL midline.     PICC DOUBLE LUMEN PLACEMENT Right 05/28/2022    right basilic 5 fr dl picc 40 cm     PICC DOUBLE LUMEN PLACEMENT Right 08/15/2022    Right Lateral, 41 total length, 3 cm external length            Family History:   Reviewed and non-contributory.   No family history of unusual infections, blood clots, or autoimmune disease.          Social History:     Social History     Tobacco Use     Smoking status: Not on file     Smokeless tobacco: Not on file   Substance Use Topics     Alcohol use: Not on file     History   Sexual Activity     Sexual activity: Not on file            Current Medications:       acetaminophen  975 mg Oral Q8H     atorvastatin  40 mg Oral QPM     digoxin  125 mcg Oral Daily     hydrALAZINE  50 mg Oral TID     hydroxychloroquine  200 mg Oral BID     lisinopril  10 mg Oral Daily     [Held by provider] mycophenolate  1,500 mg Oral BID IS     pantoprazole  40 mg Oral Daily     QUEtiapine  150 mg Oral At Bedtime     QUEtiapine  25 mg Oral Daily     QUEtiapine  25 mg Oral Daily before lunch     sertraline  100 mg Oral Daily     sodium chloride  2 spray Both Nostrils Q4H While awake     tamsulosin  0.4 mg Oral Daily     thiamine  100 mg Oral Daily     vancomycin  125 mg Oral 4x Daily     Warfarin Therapy Reminder  1 each Oral See Admin Instructions            Allergies:   No Known Allergies         Physical Exam:   Vitals were reviewed  Patient Vitals for the past 24 hrs:   BP Temp Temp src Pulse Resp SpO2 Height Weight   09/19/22 0729 -- -- -- 89 -- 96 % -- --   09/18/22 2345 -- -- -- (!) 46 -- 97 % -- --   09/18/22 1800 100/76 -- -- -- -- -- -- --   09/18/22 1744 -- 97.4  F (36.3  C) Oral 58 26 100 % 1.702 m (5' 7\") 59.9 kg (132 lb)       Physical Examination:  GENERAL:  Lying in bed in no acute distress, deconditioned  HEENT:  Head is normocephalic, atraumatic   EYES:  Eyes have anicteric sclerae " without conjunctival injection     NECK:  Supple. No cervical lymphadenopathy  LUNGS:  Clear to auscultation bilateral. Normal effort. No wheezes or crackles.  CARDIOVASCULAR:  LVAD hum.  ABDOMEN:  Normal bowel sounds, soft, nontender. LVAD driveline covered with clean sterile bandage.   SKIN:  No acute rashes.  Line(s) are in place without any surrounding erythema or exudate.   NEUROLOGIC:  Grossly nonfocal. Active x4 extremities         Laboratory Data:     Inflammatory Markers    Recent Labs   Lab Test 09/18/22 1938 09/04/22  2234 08/03/22  0606 06/19/22  1522   SED  --   --   --  39*   CRP 9.97* 13.50* 141.00* 28.0*       Hematology Studies    Recent Labs   Lab Test 09/19/22  0904 09/18/22  1938 09/15/22  0603 09/13/22  0816 09/12/22  0926 09/11/22  1140 07/20/22  1523 07/20/22  0949 07/20/22  0355 07/19/22  2049 07/19/22  1601 07/19/22  0946 07/19/22  0322 07/18/22  2118   WBC 4.9 5.2 4.6 5.6 5.1 5.9   < > 15.9* 13.9* 22.4*   < > 18.5* 15.5* 19.0*   ANEU  --   --   --   --   --   --   --  14.6* 11.8* 19.5*  --  16.3* 12.2* 17.5*   AEOS  --   --   --   --   --   --   --  0.5 0.1 0.4  --  0.2 0.3 0.4   HGB 10.0* 10.4* 9.0* 8.8* 9.4* 9.6*   < > 8.5* 8.1* 8.2*   < > 8.3* 7.0* 7.6*   MCV 95 93 96 98 94 96   < > 90 90 90   < > 89 92 91    231 212 232 236 239   < > 150 157 200   < > 164 155 179    < > = values in this interval not displayed.       Metabolic Studies     Recent Labs   Lab Test 09/19/22  0904 09/18/22  1938 09/15/22  0603 09/14/22  0450 09/13/22  0807 09/12/22  0926    136  --  142 139 141   POTASSIUM 4.0 4.6 4.1 4.4  4.2 4.1 4.0   CHLORIDE 107 104  --  107 106 106   CO2 20* 22  --  24 26 28   BUN 10.6 13.1  --  11.1 9.1 9.3   CR 0.64* 0.68  --  0.62* 0.63* 0.61*   GFRESTIMATED >90 >90  --  >90 >90 >90       Hepatic Studies    Recent Labs   Lab Test 09/18/22  1938 09/06/22  1333 09/04/22  2234 09/01/22  0606 08/29/22  0804 08/21/22  0510   BILITOTAL 0.4 0.4 0.4 0.4 0.5 0.3   ALKPHOS 112  136* 133* 131 156* 146*   ALBUMIN 3.9 4.1 3.8 3.1* 3.2* 3.2*   AST 39 30 42 21 24 24   ALT 14 20 15 22 23 13           Imaging:   CT Abdomen Pelvis W Contrast, 9/18/2022  IMPRESSION:   1.  No evidence for pericolonic inflammatory edema or overt colonic wall thickening allowing for predominant collapse of the colon. No bowel obstruction.  2.  Left ventricular assist device. Minimal pericardial fluid with slightly more focal area of fluid posterior to the inferior margin of the sternum with slight peripheral enhancement surrounding this fluid. No evidence for gas within the fluid that   would suggest overt signs of infection.  3.  Mildly enlarged lymph nodes in the left retroperitoneal region. Recommend continued close follow-up.    Chest X-ray, 9/10/2022  IMPRESSION:  1. Mild perihilar retrocardiac interstitial opacities likely  representing mild pulmonary edema versus atelectasis.  2. Question small right pleural effusion.

## 2022-09-19 NOTE — PROGRESS NOTES
Talked to LVAD coordinator for  update on patient  LVAD alarms. Message send to Cards. No call received  Yet. LITA Tamayo

## 2022-09-19 NOTE — TELEPHONE ENCOUNTER
[facility] Call Center    Phone Message    May a detailed message be left on voicemail: yes     Reason for Call: Order(s): Home Care Orders: Skilled Nursing:     Action Taken: Message routed to:  [facility] cardio    Travel Screening: Not Applicable

## 2022-09-20 ENCOUNTER — APPOINTMENT (OUTPATIENT)
Dept: PHYSICAL THERAPY | Facility: CLINIC | Age: 62
DRG: 393 | End: 2022-09-20
Payer: COMMERCIAL

## 2022-09-20 LAB
ALBUMIN SERPL BCG-MCNC: 3.7 G/DL (ref 3.5–5.2)
ALP SERPL-CCNC: 102 U/L (ref 40–129)
ALT SERPL W P-5'-P-CCNC: 11 U/L (ref 10–50)
ANION GAP SERPL CALCULATED.3IONS-SCNC: 11 MMOL/L (ref 7–15)
AST SERPL W P-5'-P-CCNC: 20 U/L (ref 10–50)
BASOPHILS # BLD AUTO: 0.1 10E3/UL (ref 0–0.2)
BASOPHILS NFR BLD AUTO: 1 %
BILIRUB SERPL-MCNC: 0.4 MG/DL
BUN SERPL-MCNC: 12.1 MG/DL (ref 8–23)
CALCIUM SERPL-MCNC: 9.4 MG/DL (ref 8.8–10.2)
CHLORIDE SERPL-SCNC: 107 MMOL/L (ref 98–107)
CREAT SERPL-MCNC: 0.67 MG/DL (ref 0.67–1.17)
DEPRECATED HCO3 PLAS-SCNC: 20 MMOL/L (ref 22–29)
EOSINOPHIL # BLD AUTO: 0.1 10E3/UL (ref 0–0.7)
EOSINOPHIL NFR BLD AUTO: 2 %
ERYTHROCYTE [DISTWIDTH] IN BLOOD BY AUTOMATED COUNT: 17 % (ref 10–15)
GFR SERPL CREATININE-BSD FRML MDRD: >90 ML/MIN/1.73M2
GLUCOSE SERPL-MCNC: 93 MG/DL (ref 70–99)
HCT VFR BLD AUTO: 31.1 % (ref 40–53)
HGB BLD-MCNC: 9.8 G/DL (ref 13.3–17.7)
IMM GRANULOCYTES # BLD: 0 10E3/UL
IMM GRANULOCYTES NFR BLD: 0 %
INR PPP: 1.79 (ref 0.85–1.15)
LYMPHOCYTES # BLD AUTO: 1.1 10E3/UL (ref 0.8–5.3)
LYMPHOCYTES NFR BLD AUTO: 21 %
MAGNESIUM SERPL-MCNC: 1.7 MG/DL (ref 1.7–2.3)
MCH RBC QN AUTO: 29.3 PG (ref 26.5–33)
MCHC RBC AUTO-ENTMCNC: 31.5 G/DL (ref 31.5–36.5)
MCV RBC AUTO: 93 FL (ref 78–100)
MONOCYTES # BLD AUTO: 0.5 10E3/UL (ref 0–1.3)
MONOCYTES NFR BLD AUTO: 10 %
NEUTROPHILS # BLD AUTO: 3.3 10E3/UL (ref 1.6–8.3)
NEUTROPHILS NFR BLD AUTO: 66 %
NRBC # BLD AUTO: 0 10E3/UL
NRBC BLD AUTO-RTO: 0 /100
PLATELET # BLD AUTO: 215 10E3/UL (ref 150–450)
POTASSIUM SERPL-SCNC: 3.9 MMOL/L (ref 3.4–5.3)
PROT SERPL-MCNC: 6.9 G/DL (ref 6.4–8.3)
RBC # BLD AUTO: 3.35 10E6/UL (ref 4.4–5.9)
SODIUM SERPL-SCNC: 138 MMOL/L (ref 136–145)
WBC # BLD AUTO: 5.1 10E3/UL (ref 4–11)

## 2022-09-20 PROCEDURE — 250N000013 HC RX MED GY IP 250 OP 250 PS 637

## 2022-09-20 PROCEDURE — 93750 INTERROGATION VAD IN PERSON: CPT | Performed by: INTERNAL MEDICINE

## 2022-09-20 PROCEDURE — 93005 ELECTROCARDIOGRAM TRACING: CPT

## 2022-09-20 PROCEDURE — 80053 COMPREHEN METABOLIC PANEL: CPT

## 2022-09-20 PROCEDURE — 258N000003 HC RX IP 258 OP 636

## 2022-09-20 PROCEDURE — 85610 PROTHROMBIN TIME: CPT

## 2022-09-20 PROCEDURE — 99233 SBSQ HOSP IP/OBS HIGH 50: CPT | Mod: 25 | Performed by: INTERNAL MEDICINE

## 2022-09-20 PROCEDURE — 83735 ASSAY OF MAGNESIUM: CPT

## 2022-09-20 PROCEDURE — 97530 THERAPEUTIC ACTIVITIES: CPT | Mod: GP

## 2022-09-20 PROCEDURE — 999N000111 HC STATISTIC OT IP EVAL DEFER

## 2022-09-20 PROCEDURE — 97161 PT EVAL LOW COMPLEX 20 MIN: CPT | Mod: GP

## 2022-09-20 PROCEDURE — 250N000011 HC RX IP 250 OP 636

## 2022-09-20 PROCEDURE — 999N000128 HC STATISTIC PERIPHERAL IV START W/O US GUIDANCE

## 2022-09-20 PROCEDURE — 85025 COMPLETE CBC W/AUTO DIFF WBC: CPT

## 2022-09-20 PROCEDURE — 99232 SBSQ HOSP IP/OBS MODERATE 35: CPT | Performed by: PSYCHIATRY & NEUROLOGY

## 2022-09-20 PROCEDURE — 36415 COLL VENOUS BLD VENIPUNCTURE: CPT

## 2022-09-20 PROCEDURE — 250N000013 HC RX MED GY IP 250 OP 250 PS 637: Performed by: INTERNAL MEDICINE

## 2022-09-20 PROCEDURE — 214N000001 HC R&B CCU UMMC

## 2022-09-20 PROCEDURE — 93010 ELECTROCARDIOGRAM REPORT: CPT | Performed by: INTERNAL MEDICINE

## 2022-09-20 RX ORDER — WARFARIN SODIUM 7.5 MG/1
7.5 TABLET ORAL
Status: COMPLETED | OUTPATIENT
Start: 2022-09-20 | End: 2022-09-20

## 2022-09-20 RX ORDER — METHYLPHENIDATE HYDROCHLORIDE 5 MG/1
5 TABLET ORAL 2 TIMES DAILY
Status: DISCONTINUED | OUTPATIENT
Start: 2022-09-20 | End: 2022-09-22

## 2022-09-20 RX ORDER — MAGNESIUM SULFATE HEPTAHYDRATE 40 MG/ML
2 INJECTION, SOLUTION INTRAVENOUS ONCE
Status: COMPLETED | OUTPATIENT
Start: 2022-09-20 | End: 2022-09-20

## 2022-09-20 RX ADMIN — ACETAMINOPHEN 975 MG: 325 TABLET, FILM COATED ORAL at 05:39

## 2022-09-20 RX ADMIN — SERTRALINE HYDROCHLORIDE 100 MG: 100 TABLET ORAL at 08:32

## 2022-09-20 RX ADMIN — VANCOMYCIN HYDROCHLORIDE 125 MG: 125 CAPSULE ORAL at 13:15

## 2022-09-20 RX ADMIN — THIAMINE HCL TAB 100 MG 100 MG: 100 TAB at 08:31

## 2022-09-20 RX ADMIN — ACETAMINOPHEN 975 MG: 325 TABLET, FILM COATED ORAL at 13:15

## 2022-09-20 RX ADMIN — SALINE NASAL SPRAY 2 SPRAY: 1.5 SOLUTION NASAL at 17:30

## 2022-09-20 RX ADMIN — HYDRALAZINE HYDROCHLORIDE 50 MG: 50 TABLET, FILM COATED ORAL at 21:01

## 2022-09-20 RX ADMIN — SALINE NASAL SPRAY 2 SPRAY: 1.5 SOLUTION NASAL at 10:08

## 2022-09-20 RX ADMIN — HYDROXYCHLOROQUINE SULFATE 200 MG: 200 TABLET, FILM COATED ORAL at 21:01

## 2022-09-20 RX ADMIN — ACETAMINOPHEN 975 MG: 325 TABLET, FILM COATED ORAL at 21:01

## 2022-09-20 RX ADMIN — Medication 3 MG: at 21:01

## 2022-09-20 RX ADMIN — DIGOXIN 125 MCG: 125 TABLET ORAL at 08:31

## 2022-09-20 RX ADMIN — MAGNESIUM SULFATE IN WATER 2 G: 40 INJECTION, SOLUTION INTRAVENOUS at 08:57

## 2022-09-20 RX ADMIN — QUETIAPINE 25 MG: 25 TABLET ORAL at 08:32

## 2022-09-20 RX ADMIN — Medication 1 TABLET: at 08:31

## 2022-09-20 RX ADMIN — VANCOMYCIN HYDROCHLORIDE 125 MG: 125 CAPSULE ORAL at 21:17

## 2022-09-20 RX ADMIN — VANCOMYCIN HYDROCHLORIDE 125 MG: 125 CAPSULE ORAL at 17:30

## 2022-09-20 RX ADMIN — SALINE NASAL SPRAY 2 SPRAY: 1.5 SOLUTION NASAL at 21:17

## 2022-09-20 RX ADMIN — METHYLPHENIDATE HYDROCHLORIDE 5 MG: 5 TABLET ORAL at 15:46

## 2022-09-20 RX ADMIN — PANTOPRAZOLE SODIUM 40 MG: 40 TABLET, DELAYED RELEASE ORAL at 08:31

## 2022-09-20 RX ADMIN — HYDRALAZINE HYDROCHLORIDE 50 MG: 50 TABLET, FILM COATED ORAL at 08:29

## 2022-09-20 RX ADMIN — SALINE NASAL SPRAY 2 SPRAY: 1.5 SOLUTION NASAL at 13:15

## 2022-09-20 RX ADMIN — WARFARIN SODIUM 7.5 MG: 7.5 TABLET ORAL at 17:30

## 2022-09-20 RX ADMIN — HYDROXYCHLOROQUINE SULFATE 200 MG: 200 TABLET, FILM COATED ORAL at 08:31

## 2022-09-20 RX ADMIN — HYDRALAZINE HYDROCHLORIDE 50 MG: 50 TABLET, FILM COATED ORAL at 15:45

## 2022-09-20 RX ADMIN — LISINOPRIL 10 MG: 10 TABLET ORAL at 08:30

## 2022-09-20 RX ADMIN — VANCOMYCIN HYDROCHLORIDE 125 MG: 125 CAPSULE ORAL at 08:30

## 2022-09-20 RX ADMIN — ATORVASTATIN CALCIUM 40 MG: 40 TABLET, FILM COATED ORAL at 21:02

## 2022-09-20 RX ADMIN — TAMSULOSIN HYDROCHLORIDE 0.4 MG: 0.4 CAPSULE ORAL at 08:30

## 2022-09-20 RX ADMIN — SODIUM CHLORIDE, POTASSIUM CHLORIDE, SODIUM LACTATE AND CALCIUM CHLORIDE 500 ML: 600; 310; 30; 20 INJECTION, SOLUTION INTRAVENOUS at 10:03

## 2022-09-20 RX ADMIN — Medication 150 MG: at 21:01

## 2022-09-20 RX ADMIN — QUETIAPINE 25 MG: 25 TABLET ORAL at 11:42

## 2022-09-20 ASSESSMENT — ACTIVITIES OF DAILY LIVING (ADL)
ADLS_ACUITY_SCORE: 33
ADLS_ACUITY_SCORE: 30
ADLS_ACUITY_SCORE: 33

## 2022-09-20 ASSESSMENT — ENCOUNTER SYMPTOMS
COUGH: 0
DIARRHEA: 1
FATIGUE: 1
VOMITING: 0
ABDOMINAL PAIN: 1
SHORTNESS OF BREATH: 0
BRUISES/BLEEDS EASILY: 0
BLOOD IN STOOL: 0
NAUSEA: 1
LIGHT-HEADEDNESS: 0

## 2022-09-20 NOTE — CONSULTS
"Triage and Transition - Consult and Liaison     Dandy Sands  September 20, 2022    Session start: 1015  Session end: 1037  Session duration in minutes: 17 minutes  CPT utilized: 62253 - Brief diagnostic assessment (modifier 52)  Patient was seen virtually ([employer] cart or other teleconferencing device).    This writer's role is to meet with patients before psychiatric medication providers and then get them the information they need to assist with medication recommendations as needed.     Diagnosis:   Adjustment Disorders  309.28 (F43.23) With mixed anxiety and depressed mood, present;    Plan/Recommendations:     Continue care coordination with care team.     Maintain current transition plan.     LM for Dr. Palomo from psychiatry that Writer has met with patient and ready for medication conversation. Interested in something for mood and sleep if possible. Reports broken sleep at night and causing fatigue during the day.     Reason for consult: Psychiatry consult was requested due to concern for depression after LVAD. Staff and family noting concerns.. Patient was seen by Triage and Transition Consult & Liaison team.     Identifying information: Dandy Sands is a 61 year old male with a PMHx of SLE, antiphospholipid syndrome, pulmonary embolism (on warfarin), ICM (EF 10-15%) s/p HM3 LVAD (7/28/22), and C. Diff infection (on PO vancomycin taper) who is currently admitted for low flow alarms and ongoing profuse diarrhea.    Summary of Patient Situation  Mr. Sands reports that being back in the hospital is depressing. Notes a PHQ9 score of 9 indicating mild anhedonia (feels this will be better when out of the hospital), feeling depressed, fatigue, feeling bad about self and \"the burden I am putting on my kids\".  Notes that he is not sleeping well during the night noting going to bed at midnight and waking up every two hours until 6am and then being up for the day but tired throughout the day. Notes his appetite to be " "variable from day to day and notes it was better at home with home cooking vs hospital food he has had many times that \" I know the menu by heart\".  He worries about his medical needs and the impact on his children noting specific concern for time away from work and cost they have had to take on to help him and notes \"its not fair to them\".  He denies SI, HI and NSSIB.  Denies and does not demonstrate symptoms of vicente or psychosis.      Notes his he is [place] and that he needs reading glasses to read anything.      Brief Psychosocial History    Mr. Sands grew up in [place], [place] with his parents and three sisters. His parents and two sisters are . He remains close with his one sister reporting \"we are all eachohter has now\".  He dropped out of high school his senior year and joined the [employer] for three years. He then moved on to the [employer] for many years. When he was working he was working as a  for 35 years. He is retired on disability due to his medical complexities.  He was  and . He is not currently in a relationship. He has \"a few kids\". Notes being close with his son and DIL. Also, reports his daughter just had his first grandchild a month ago.  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  He does not have a Mandaeism or cultural practice that would impact current or future mental health care.     Significant Clinical History    Mr. Sands denies a history of mental health or substance abuse diagnoses. Notes no family history of mental health diagnoses he is aware of. He notes ETOH substance use disorders on both his maternal and paternal family tree.     Current Providers  Primary Care Provider:   Primary Physician: Scar Jeffrey Primary Address: [facility]  [address] [facility]*   Primary Phone: [phone] Primary Fax: [phone]       Psychiatrist: No   Therapist: No   Case " Manager: No   ARMHS: No   ACT Team: No   Other: No    Collateral information:   Reviewed chart and coordinated with psychiatric medication provider.      Per social work note on 9/19/22 family has reported that patient is weak and limited in his independence as he is not willing to do much for himself. He stays in bed and isolates to his room.       Mental Status Exam   Affect: Flat  Appearance: Appropriate   Attention Span/Concentration: Attentive    Eye Contact: Variable  Fund of Knowledge: Appropriate   Language /Speech Content: Fluent  Language /Speech Volume: Normal   Language /Speech Rate/Productions: Normal   Recent Memory: Intact  Remote Memory: Intact  Mood: Sad   Orientation:   Person: Yes   Place: Yes  Time of Day: Yes   Date: Yes   Situation (Do they understand why they are here?): Yes   Psychomotor Behavior: Underactive   Thought Content: Clear  Thought Form: Intact    Current medications:  Current Facility-Administered Medications   Medication     acetaminophen (TYLENOL) tablet 975 mg     atorvastatin (LIPITOR) tablet 40 mg     Continuing ACE inhibitor/ARB/ARNI from home medication list OR ACE inhibitor/ARB/ARNI order already placed during this visit     digoxin (LANOXIN) tablet 125 mcg     HOLD nitroGLYcerin IF     hydrALAZINE (APRESOLINE) tablet 50 mg     hydrocortisone 1 % cream CREA     hydroxychloroquine (PLAQUENIL) tablet 200 mg     lactated ringers BOLUS 500 mL     lisinopril (ZESTRIL) tablet 10 mg     magnesium sulfate 2 g in water intermittent infusion     melatonin tablet 3 mg     multivitamin w/minerals (THERA-VIT-M) tablet 1 tablet     [Held by provider] mycophenolate (GENERIC EQUIVALENT) tablet 1,500 mg     pantoprazole (PROTONIX) EC tablet 40 mg     Patient is already receiving anticoagulation with heparin, enoxaparin (LOVENOX), warfarin (COUMADIN)  or other anticoagulant medication     pramox-pe-glycerin-petrolatum (PREPARATION H) cream     QUEtiapine (SEROquel) tablet 150 mg      QUEtiapine (SEROquel) tablet 25 mg     QUEtiapine (SEROquel) tablet 25 mg     Reason beta blocker not prescribed     sertraline (ZOLOFT) tablet 100 mg     sodium chloride (OCEAN) 0.65 % nasal spray 2 spray     tamsulosin (FLOMAX) capsule 0.4 mg     thiamine (B-1) tablet 100 mg     vancomycin (VANCOCIN) capsule 125 mg     Warfarin Dose Required Daily - Pharmacist Managed        Therapeutic intervention and progress:  Therapeutic intervention consisted of building therapeutic rapport, active listening and validation. Patient is making progress towards treatment goals as evidenced by participation in assessment and conversation about future planning and care.      JOSE SHORT MSW, LICSW  Triage and Transition - Consult and Liaison   [phone]

## 2022-09-20 NOTE — CONSULTS
Psychiatry Consultation; Follow up              Reason for Consult, requesting source:    Depression and anxiety. I had initially seen him 8/2 with follow up 8/10. He was also seen with Machelle SCHROEDER today.  Requesting source: Maria Elena Lawrence    Labs and imaging reviewed, discussed with team.                Interim history:    The 61 year old man has been hospitalized since May and this is really wearing him down.   When I had seen him in early August I had suggested a change from Cymbalta to Zoloft (usually better for anxiety) and Seroquel was suggested to address anxiety, sleep difficulties and intermittent delirium.   I cannot elicit any symptoms consistent with delirium, but he is feeling more depressed just from being in the hospital.  He is also having quite a bit of anxiety but the Seroquel use during the day helps this.  He still is sleeping very lightly even with 150 mg of Seroquel.  I had suggested daytime Seroquel to use as needed instead of hydroxyzine to avoid anticholinergic burden when he was having problems with delirium.  Now that he has C. difficile and very frequent diarrhea perhaps anticholinergics and not a bad idea..   He is not feeling hopeless and has no passive death wish.  He is losing a lot of weight; his appetite is not very good.          Current Medications:       acetaminophen  975 mg Oral Q8H     atorvastatin  40 mg Oral QPM     digoxin  125 mcg Oral Daily     hydrALAZINE  50 mg Oral TID     hydroxychloroquine  200 mg Oral BID     lisinopril  10 mg Oral Daily     multivitamin w/minerals  1 tablet Oral Daily     [Held by provider] mycophenolate  1,500 mg Oral BID IS     pantoprazole  40 mg Oral Daily     QUEtiapine  150 mg Oral At Bedtime     QUEtiapine  25 mg Oral Daily     QUEtiapine  25 mg Oral Daily before lunch     sertraline  100 mg Oral Daily     sodium chloride  2 spray Both Nostrils Q4H While awake     tamsulosin  0.4 mg Oral Daily     thiamine  100 mg Oral  "Daily     vancomycin  125 mg Oral 4x Daily     warfarin ANTICOAGULANT  7.5 mg Oral ONCE at 18:00     Warfarin Therapy Reminder  1 each Oral See Admin Instructions              MSE:   Appearance: awake, alert and adequately groomed  Attitude:  cooperative  Eye Contact:  good  Mood:  sad  and \"fair\"  Affect:  : slightly restricted  Speech:  clear, coherent  Psychomotor Behavior:  no evidence of tardive dyskinesia, dystonia, or tics  Thought Process:  logical and goal oriented  Associations:  no loose associations  Thought Content:  no evidence of suicidal ideation or homicidal ideation and no evidence of psychotic thought  Insight:  good  Judgement:  intact  Oriented to:  time, person, and place  Attention Span and Concentration:  intact  Recent and Remote Memory:  fair    Vital signs:  Temp: 97.9  F (36.6  C) Temp src: Oral BP: (!) 89/81 Pulse: 84   Resp: 16 SpO2: 97 % O2 Device: None (Room air)   Height: 170.2 cm (5' 7\") Weight: 61.3 kg (135 lb 1.6 oz)  Estimated body mass index is 21.16 kg/m  as calculated from the following:    Height as of this encounter: 1.702 m (5' 7\").    Weight as of this encounter: 61.3 kg (135 lb 1.6 oz).    Qtc: difficult to be sure, but likely prolonged           DSM-5 Diagnosis:   Depression and anxiety secondary to severe cardiovascular disease, etc   Recent delirium           Assessment:   Related to the prolonged hospitalization he continues to feel quite depressed and anxious.  1 option is to increase the Zoloft to 150 mg, but we will need to monitor for increased diarrhea.  Sometimes in the medically ill and the situation and augmentation with Ritalin is helpful.          Summary of Recommendations:   Consider increase of Zoloft to 150 mg  I would try Ritalin 5 mg in the morning and early afternoon increasing to 10 mg per dose in a few days as tolerated  I agree with getting a health psychology consultation; I think a little psychotherapy would be helpful.  Page me or re-consult " "psychiatry as needed (psychiatry is signing off).     Behzad Palomo M.D.   [facility]   Contact information available via [facility] Paging/Directory.  If I am not available, then [facility] intake ([phone]) should know who   Is on call      \"Much or all of the text in this note was generated through the use of Dragon Dictate voice to text software. Errors in spelling or words which appear to be out of contact are unintentional, may be present due having escaped editing\"           "

## 2022-09-20 NOTE — PLAN OF CARE
Pt admitted 9/18 with decrease flows and alarms on HM III LVAD.  Pt continues with dizziness and hx of multiple recent falls.  Bed alarm on and must remain close during toilet ing.  S/p 500 ml LR bolus.  Team wants pt to drink at least a liter and half a day.  Virgilio counts started.  One time 2 grams of Mag replaced.  Psychology and Psychiatry visited with prn today.  Psych started pt on Ritalin.  Pt is a hard PIV poke and has lost many in the past, today one infiltrated.  Vascular team wanted a midline, but team on the fence r/t pt just need to improve intake.  Vanco given as scheduled for recent C-diff infection.  Otherwise, pt resting well and up SBA with walker.  Continue to monitor and with POC.

## 2022-09-20 NOTE — PROGRESS NOTES
D-HM 3 LVAD pt admitted from ED with fatigue, diarrhea and low flow alarms. Already on oral vancomycin for C. Diff. See flow sheets for vs and assessments. Son at bedside.  I-Admission profile completed. 2 RN skin assessment completed by myself and , RN. Pt instructed to call for assistance to get up to the bedside commode.  A-Pt had one loose stool this shift. No low flow alarms.  P-Continue with current poc. Notify provider any concerns/changes.

## 2022-09-20 NOTE — PROGRESS NOTES
09/20/22 1500   Quick Adds   Type of Visit Initial PT Evaluation   Living Environment   People in Home alone   Current Living Arrangements house   Home Accessibility stairs to enter home   Number of Stairs, Main Entrance 2   Stair Railings, Main Entrance none   Transportation Anticipated family or friend will provide   Living Environment Comments Patient from [place], currently staying at [facility], will return to [facility] with 24 hour support from son.   Self-Care   Usual Activity Tolerance moderate   Current Activity Tolerance fair   Equipment Currently Used at Home walker, rolling   Fall history within last six months yes   Number of times patient has fallen within last six months 2   Activity/Exercise/Self-Care Comment Patient with recent hospital admission, reports had HH therapies set up at [facility] however unable to start 2/2 hospital admissions. Patient reports moving okay, has FWW but son reports patient not using in apartment which is concerning to family. Patient has had little tolerance for activity 2/2 dizziness and low flow alarms.   General Information   Onset of Illness/Injury or Date of Surgery 09/18/22   Referring Physician Rhea Watson MD   Patient/Family Therapy Goals Statement (PT) to return home   Pertinent History of Current Problem (include personal factors and/or comorbidities that impact the POC) Dandy Sands is a 61 year old male with a PMHx of SLE, antiphospholipid syndrome, pulmonary embolism (on warfarin), ICM (EF 10-15%) s/p HM3 LVAD (7/28/22), and C. Diff infection (on PO vancomycin taper) who is currently admitted 9/18/2022 for low flow alarms and ongoing profuse diarrhea. Continuing po vancomycin, administering fluids as needed, encouraging fluids.   Existing Precautions/Restrictions fall   Cognition   Affect/Mental Status (Cognition) WFL   Orientation Status (Cognition) oriented x 4   Follows Commands (Cognition) WFL   Pain Assessment   Patient  Currently in Pain No   Posture    Posture Forward head position;Protracted shoulders   Range of Motion (ROM)   ROM Comment BLE WFL   Strength Comprehensive (MMT)   Comment, General Manual Muscle Testing (MMT) Assessment generalized BLE weakness and deconditioning, demonstrates at least 3+/5 BLE strength with mobility   Bed Mobility   Comment, (Bed Mobility) IND   Transfers   Comment, (Transfers) SBA-IND   Gait/Stairs (Locomotion)   Comment, (Gait/Stairs) Ambulates with FWW and CGA, decreased gait speed   Balance   Balance Comments good sitting balance, impaired dynamic balance, requiring FWW for stability   Sensory Examination   Sensory Perception patient reports no sensory changes   Clinical Impression   Criteria for Skilled Therapeutic Intervention Yes, treatment indicated   PT Diagnosis (PT) impaired functional mobility   Influenced by the following impairments strength, balance, activity tolerance, dizziness   Functional limitations due to impairments gait, stairs, functional endurance   Clinical Presentation (PT Evaluation Complexity) Stable/Uncomplicated   Clinical Presentation Rationale PMH/comorbidities, clinical judgement   Clinical Decision Making (Complexity) low complexity   Planned Therapy Interventions (PT) balance training;gait training;patient/family education;strengthening;stair training;progressive activity/exercise;risk factor education   Risk & Benefits of therapy have been explained evaluation/treatment results reviewed;care plan/treatment goals reviewed;current/potential barriers reviewed;participants voiced agreement with care plan;participants included;patient;son   PT Discharge Planning   PT Discharge Recommendation (DC Rec) home with assist;home with home care physical therapy;home with outpatient cardiac rehab   PT Rationale for DC Rec Patient mobilizing well, primarily limited by dizziness, balance, and low flow alarms. Anticipate will be safe to return to [facility] with support from  son, use of FWW for ambulation and HH PT vs OP CR.   PT Brief overview of current status ambulation SBA and FWW, encourage mobility and walking OOR   Plan of Care Review   Plan of Care Reviewed With patient;son   Total Evaluation Time   Total Evaluation Time (Minutes) 6   Physical Therapy Goals   PT Frequency 4x/week   PT Predicted Duration/Target Date for Goal Attainment 09/27/22   PT Goals Gait;Aerobic Activity   PT: Gait Independent;Rolling walker;Greater than 200 feet   PT: Perform aerobic activity with stable cardiovascular response continuous activity;20 minutes   PT: Goal 1 Demonstrate low falls risk with a balance assessment score of < 13.5 seconds on TUG,  > 19/24 on Dynamic Gait Index, > 9/12 on 4-Item Dynamic Gait Index, >22/30 on Functional Gait Assessment, or > 45/56 on David Balance Assessment.

## 2022-09-20 NOTE — PHARMACY-ADMISSION MEDICATION HISTORY
Admission Medication History Completed by Pharmacy    See [facility] Admission Navigator for allergy information, preferred outpatient pharmacy, prior to admission medications and immunization status.     Medication History Sources:     9/15/22 discharge medication list, [facility] fill history, 9/14/22 pharmacy medication history    Changes made to PTA medication list (reason):    Added: None    Deleted: None    Changed: None    Additional Information:    None    Prior to Admission medications    Medication Sig Last Dose Taking? Auth Provider Long Term End Date   acetaminophen (TYLENOL) 325 MG tablet Take 3 tablets (975 mg) by mouth every 8 hours for 30 days  Yes Abner Mathew MD  10/1/22   atorvastatin (LIPITOR) 40 MG tablet Take 1 tablet (40 mg) by mouth every evening for 30 days  Yes Abner Mathew MD Yes 10/1/22   digoxin (LANOXIN) 125 MCG tablet Take 1 tablet (125 mcg) by mouth daily for 30 days  Yes Abner Mathew MD Yes 10/1/22   fluticasone-vilanterol (BREO ELLIPTA) 100-25 MCG/INH inhaler Inhale 1 puff into the lungs daily for 30 days Unknown at Unknown time Yes Abner Mathew MD  10/2/22   hydrALAZINE (APRESOLINE) 50 MG tablet Take 1 tablet (50 mg) by mouth 3 times daily for 30 days  Yes Abner Mathew MD Yes 10/2/22   hydrocortisone 1 % CREA cream Place rectally 3 times daily  Yes Keily Bowers MD     hydroxychloroquine (PLAQUENIL) 200 MG tablet Take 1 tablet (200 mg) by mouth 2 times daily  Yes Justo Stewart MD     hydrOXYzine (ATARAX) 25 MG tablet Take 1 tablet (25 mg) by mouth every 6 hours as needed for anxiety (ANXIETY)  Yes Gisell Barnes MD  10/4/22   lisinopril (ZESTRIL) 10 MG tablet Take 1 tablet (10 mg) by mouth daily for 30 days  Yes Abner Mathew MD Yes 10/2/22   Melatonin 10 MG TABS tablet Take 1 tablet (10 mg) by mouth At Bedtime for 30 days  Yes Gisell Barnes MD  10/4/22   multivitamin w/minerals (THERA-VIT-M) tablet Take 1 tablet  by mouth daily  Yes Spencer Mehta MD     mycophenolate (GENERIC EQUIVALENT) 500 MG tablet Take 3 tablets (1,500 mg) by mouth 2 times daily for 30 days  Yes Abner Mathew MD Yes 10/1/22   pantoprazole (PROTONIX) 40 MG EC tablet Take 1 tablet (40 mg) by mouth daily  Yes Gisell Barnes MD     pramox-pe-glycerin-petrolatum (PREPARATION H) 1-0.25-14.4-15 % CREA cream Place rectally 2 times daily as needed for hemorrhoids Apply immediately after a bowel movement.  Yes Gisell Barnes MD     promethazine (PHENERGAN) 12.5 MG tablet Take 1 tablet (12.5 mg) by mouth every 6 hours as needed for nausea or vomiting  Yes Gisell Barnes MD     QUEtiapine (SEROQUEL) 25 MG tablet Take 1 tablet (25 mg) by mouth 2 times daily for 30 days  Yes Abner Mathew MD Yes 10/1/22   QUEtiapine (SEROQUEL) 50 MG tablet Take 3 tablets (150 mg) by mouth At Bedtime for 30 days  Yes Abner Mathew MD Yes 10/1/22   saline nasal (AYR SALINE) GEL topical gel Apply into each nare At Bedtime  Yes Unknown, Entered By History     sertraline (ZOLOFT) 100 MG tablet Take 1 tablet (100 mg) by mouth daily for 30 days  Yes Abner Mathew MD Yes 10/1/22   sodium chloride (OCEAN) 0.65 % nasal spray Spray 2 sprays into both nostrils every 4 hours (while awake)  Yes Unknown, Entered By History     tamsulosin (FLOMAX) 0.4 MG capsule Take 0.4 mg by mouth daily  Yes Unknown, Entered By History     thiamine (B-1) 100 MG tablet Take 1 tablet (100 mg) by mouth daily  Yes Justo Stewart MD     vancomycin (VANCOCIN) 125 MG capsule Take 1 capsule (125 mg) by mouth 4 times daily  Yes Keily Bowers MD     warfarin ANTICOAGULANT (COUMADIN) 2.5 MG tablet Take 2 tabs (5mg) daily at bedtime. Future dose adjustments pending INR  Yes Ceferino Thomas MD     zolpidem (AMBIEN) 5 MG tablet Take 5 mg by mouth nightly as needed for sleep  Yes Unknown, Entered By History         Date completed: 09/20/22    Medication history completed by: Shante  ELIDA Grant, [facility]

## 2022-09-20 NOTE — PLAN OF CARE
Admitted forlow flow alarms and ongoing profuse diarrhea on (9/18). PMHx of SLE, antiphospholipid syndrome, pulmonary embolism (on warfarin), ICM (EF 10-15%) s/p HM3 LVAD (7/28/22), and C. Diff infection (on PO vancomycin taper)      Neuro: AOx4, Calm and cooperative, no deficits   Cardiac/Tele: Paced, MAPs WNL, LVAD numbers fluctuating (Some flow alarms this morning). Other VSS  Respiratory: Room air tolerates well. Sats > 90%  GI/: Pt has diarrhea. Recent prior admission with Cdiff. Voids without issues. LBM (9/19)  Diet/Appetite: 2g Na diet with 2L FR  Skin: LVAD driveline intact. Old CT scars. No deficits  Endocrine: No deficits  LDAs: PIV saline locked  Activity: SBA  Pain: Denies     Plan: Continue POC per team

## 2022-09-20 NOTE — PLAN OF CARE
OT/6C: DEFER. Per chart review and discussion w/ PT, pt does not present w/ any IP OT needs. PT to address balance and mobility during IP stay. OT to sign off. Please consult again if new needs arise.

## 2022-09-20 NOTE — PROGRESS NOTES
[facility]    Cardiology Progress Note- Cardiology 2        Date of Admission:  9/18/2022     Assessment & Plan: MARISOL Dorman EDUARD Sands is a 61 year old male with a PMHx of SLE, antiphospholipid syndrome, pulmonary embolism (on warfarin), ICM (EF 10-15%) s/p HM3 LVAD (7/28/22), and C. Diff infection (on PO vancomycin taper) who is currently admitted 9/18/2022 for low flow alarms and ongoing profuse diarrhea. Continuing po vancomycin, administering fluids as needed, encouraging fluids.    Changes today 9/20:   - s/p 500cc LR  - liberalized diet to regular without fluid restriction  - Calorie counts  - 2g Mg ordered for Mg of 1.7  - Gatorade with each meal  - encouraged fluids (goal to drink 1.5L by the end of the day)  - will reach out to ID to come up with back up plan for infection in the event that diarrhea does not resolve after completion of po vancomycin course  - INR was subtherapeutic today, reach out to pharmacy regarding adjusting warfarin dose  - Psychiatry consult  - Psychology consult  - Started Ritalin 5mg in the morning and early afternoon per Psychiatry recs     Diarrhea  C. Diff infection (positive 9/7/2022)  Prior to admit patient was on a 14-day course of 125 mg PO vancomycin qid which was set to go through 9/21/22. Patient denies any missed doses. He states he initially improved with PO vancomycin, but over the past day prior to admission his symptoms have been worsening (3 episodes of non-bloody diarrhea per day, low energy, lack of appetite). CT A/P in the ED negative for toxic megacolon.  - ID consult, appreciate recs  - s/p 500cc LR today  - liberalized diet to regular without fluid restriction  - Calorie counts  - encouraged fluids (goal to drink 1.5L by the end of the day)  - will reach out to ID to come up with back up plan for infection in the event that diarrhea does not resolve after completion of po vancomycin course  - continue pta  vancomycin 125 mg QID (14-day course set to  after 2022)  - curb-sided Rheum- holding mycophenolate in the setting of infection, can reach out to Rheum closer to discharge to discuss how long to hold mycophenolate    Stage D HFrEF 2/2 ICM (EF 10-15%) s/p HM3 LVAD (22) and CRT-D ('06)  LVAD low flow events  CAD s/p PCI to LAD ('05)  - LVAD: LVAD speed 5100 (reduced from 5200 2022)  - Volume status: Mildly hypovolemic to euvolemic (weight on 9/15 discharge 134 lbs, weight this admit 132 lbs, today 135 lbs)   - GDMT   > BB: Discontinued during previous admission with cardiogenic shock   > ACEi/ARB/ARNi: PTA lisinopril 10 mg daily   > MRA: None   > SGLT2i: None  - SCD ppx: s/p CRT-D ()  - AC: Pharmacy to dose warfarin   > on 5 mg daily at home (INR goal 2-3)- INR was subtherapeutic today, reach out to pharmacy regarding adjusting warfarin dose  - PTA digoxin 125 mcg daily  - PTA atorvastatin 40 mg daily  - Telemetry monitoring  - 2 g sodium, <2 L fluid restriction diet  - Strict I/O  - Daily weights    HTN  - PTA hydralazine 50 mg TID  - PTA lisinopril 10 mg daily    Depression  MIGUEL  Has had low mood with multiple hospitalizations post LVAD.   - Psychiatry consult, appreciate recs  - Psychology consult, appreciate recs  - PTA seroquel 25 mg QAM, 25 mg at noon, 150 mg QPM  - PTA sertraline 100 mg daily (could consider increasing to 150mg daily per psychiatry, however there is risk of diarrhea)  - Start Ritalin 5mg in the morning and early afternoon per Psychiatry recs (started , can increase to 10mg per dose in a few days if tolerated)    GERD  Dysphagia  - PTA pantoprazole 40mg daily    SLE  APS  Hx of DVT/PE  On warfarin PTA (INR goal 2-3). Decreased to 5 mg daily during last admission due to mucus membrane bleeding.  - Pharmacy to dose warfarin  - PTA hydroxychloroquine 200mg daily  - HOLDING PTA mycophenolate 1500mg bid in the setting of infection (discussed this with Rheum as  above)    Risk for malnutrition   - Nutrition consult  - PT/OT consult  - PTA thiamine 100 mg    BPH  - PTA tamsulosin 0.4mg daily    Mild-moderate emphysema  - Not currently using any PTA inhalers      Diet:  Regular diet  DVT Prophylaxis: Warfarin  Fitzgerald Catheter: Not present  Code Status:  Full code       Disposition Plan   Expected discharge: 1-3 days, recommended to prior living arrangement once diarrhea improvement.      Entered: Austin Can MD 09/20/2022, 9:23 AM       The patient's care was discussed with the Attending Physician, Dr. Lawrence.    Austin Can MD  [facility]    ______________________________________________________________________    Interval History   NAEON. Reports that he had 2 episodes of non-bloody diarrhea yesterday evening. Had about 4 episodes of diarrhea last 24 hours. No fevers, chills, chest pain, shortness of breath, abdominal pain, nausea, vomiting. Reports that he doesn't have as many food options with low salt diet. Has been trying to drink fluids. Still feels lightheaded with getting up. Doesn't feel comfortable going home until diarrhea more stable.    Had about 3 low flow alarms past 24 hours.     Data reviewed today: I reviewed all medications, new labs and imaging results over the last 24 hours.     Physical Exam   Vital Signs: Temp: 97.9  F (36.6  C) Temp src: Oral BP: (!) 89/81 Pulse: 84   Resp: 16 SpO2: 97 % O2 Device: None (Room air)    Weight: 135 lbs 1.6 oz  General: lying in bed, NAD  HEENT: no scleral icterus or conjunctival injection, oropharynx clear  Resp: clear to auscultation bilaterally, no crackles or wheezes  Cardiac: LVAD hum. JVD at base of neck lying at 20 degrees.  Abdomen: soft, non-tender, non-distended. No rebound or guarding. Driveline site with gauze without swelling or tenderness.   Extremities: warm, no lower extremity edema  MSK: cachetic   Skin: no lesions on exposed skin  Neuro: alert,  answers questions appropriately  Psych: appropriate mood and affect    Data

## 2022-09-21 ENCOUNTER — APPOINTMENT (OUTPATIENT)
Dept: PHYSICAL THERAPY | Facility: CLINIC | Age: 62
DRG: 393 | End: 2022-09-21
Payer: COMMERCIAL

## 2022-09-21 LAB
ANION GAP SERPL CALCULATED.3IONS-SCNC: 11 MMOL/L (ref 7–15)
ATRIAL RATE - MUSE: 42 BPM
BASOPHILS # BLD AUTO: 0 10E3/UL (ref 0–0.2)
BASOPHILS NFR BLD AUTO: 1 %
BUN SERPL-MCNC: 15 MG/DL (ref 8–23)
CALCIUM SERPL-MCNC: 9.2 MG/DL (ref 8.8–10.2)
CHLORIDE SERPL-SCNC: 105 MMOL/L (ref 98–107)
CREAT SERPL-MCNC: 0.65 MG/DL (ref 0.67–1.17)
DEPRECATED HCO3 PLAS-SCNC: 20 MMOL/L (ref 22–29)
DIASTOLIC BLOOD PRESSURE - MUSE: NORMAL MMHG
EOSINOPHIL # BLD AUTO: 0.1 10E3/UL (ref 0–0.7)
EOSINOPHIL NFR BLD AUTO: 2 %
ERYTHROCYTE [DISTWIDTH] IN BLOOD BY AUTOMATED COUNT: 17 % (ref 10–15)
GFR SERPL CREATININE-BSD FRML MDRD: >90 ML/MIN/1.73M2
GLUCOSE SERPL-MCNC: 92 MG/DL (ref 70–99)
HCT VFR BLD AUTO: 31.5 % (ref 40–53)
HGB BLD-MCNC: 9.9 G/DL (ref 13.3–17.7)
IMM GRANULOCYTES # BLD: 0 10E3/UL
IMM GRANULOCYTES NFR BLD: 1 %
INR PPP: 1.92 (ref 0.85–1.15)
INTERPRETATION ECG - MUSE: NORMAL
LYMPHOCYTES # BLD AUTO: 1 10E3/UL (ref 0.8–5.3)
LYMPHOCYTES NFR BLD AUTO: 20 %
MAGNESIUM SERPL-MCNC: 1.8 MG/DL (ref 1.7–2.3)
MCH RBC QN AUTO: 29.1 PG (ref 26.5–33)
MCHC RBC AUTO-ENTMCNC: 31.4 G/DL (ref 31.5–36.5)
MCV RBC AUTO: 93 FL (ref 78–100)
MONOCYTES # BLD AUTO: 0.6 10E3/UL (ref 0–1.3)
MONOCYTES NFR BLD AUTO: 12 %
NEUTROPHILS # BLD AUTO: 3.4 10E3/UL (ref 1.6–8.3)
NEUTROPHILS NFR BLD AUTO: 64 %
NRBC # BLD AUTO: 0 10E3/UL
NRBC BLD AUTO-RTO: 0 /100
P AXIS - MUSE: NORMAL DEGREES
PLATELET # BLD AUTO: 204 10E3/UL (ref 150–450)
POTASSIUM SERPL-SCNC: 4.3 MMOL/L (ref 3.4–5.3)
PR INTERVAL - MUSE: NORMAL MS
QRS DURATION - MUSE: 384 MS
QT - MUSE: 620 MS
QTC - MUSE: 737 MS
R AXIS - MUSE: -16 DEGREES
RBC # BLD AUTO: 3.4 10E6/UL (ref 4.4–5.9)
SODIUM SERPL-SCNC: 136 MMOL/L (ref 136–145)
SYSTOLIC BLOOD PRESSURE - MUSE: NORMAL MMHG
T AXIS - MUSE: 33 DEGREES
VENTRICULAR RATE- MUSE: 85 BPM
WBC # BLD AUTO: 5.2 10E3/UL (ref 4–11)

## 2022-09-21 PROCEDURE — 214N000001 HC R&B CCU UMMC

## 2022-09-21 PROCEDURE — 85610 PROTHROMBIN TIME: CPT

## 2022-09-21 PROCEDURE — 85004 AUTOMATED DIFF WBC COUNT: CPT

## 2022-09-21 PROCEDURE — 250N000013 HC RX MED GY IP 250 OP 250 PS 637

## 2022-09-21 PROCEDURE — 93750 INTERROGATION VAD IN PERSON: CPT | Performed by: INTERNAL MEDICINE

## 2022-09-21 PROCEDURE — 97530 THERAPEUTIC ACTIVITIES: CPT | Mod: GP

## 2022-09-21 PROCEDURE — 90832 PSYTX W PT 30 MINUTES: CPT | Performed by: STUDENT IN AN ORGANIZED HEALTH CARE EDUCATION/TRAINING PROGRAM

## 2022-09-21 PROCEDURE — 83735 ASSAY OF MAGNESIUM: CPT

## 2022-09-21 PROCEDURE — 250N000011 HC RX IP 250 OP 636

## 2022-09-21 PROCEDURE — 99233 SBSQ HOSP IP/OBS HIGH 50: CPT | Mod: 25 | Performed by: INTERNAL MEDICINE

## 2022-09-21 PROCEDURE — 82435 ASSAY OF BLOOD CHLORIDE: CPT

## 2022-09-21 PROCEDURE — 250N000013 HC RX MED GY IP 250 OP 250 PS 637: Performed by: INTERNAL MEDICINE

## 2022-09-21 PROCEDURE — 97110 THERAPEUTIC EXERCISES: CPT | Mod: GP

## 2022-09-21 PROCEDURE — 36415 COLL VENOUS BLD VENIPUNCTURE: CPT

## 2022-09-21 RX ORDER — WARFARIN SODIUM 7.5 MG/1
7.5 TABLET ORAL
Status: COMPLETED | OUTPATIENT
Start: 2022-09-21 | End: 2022-09-21

## 2022-09-21 RX ORDER — MAGNESIUM SULFATE 1 G/100ML
1 INJECTION INTRAVENOUS ONCE
Status: DISCONTINUED | OUTPATIENT
Start: 2022-09-21 | End: 2022-09-21

## 2022-09-21 RX ORDER — ACETAMINOPHEN 325 MG/1
975 TABLET ORAL EVERY 8 HOURS PRN
Status: DISCONTINUED | OUTPATIENT
Start: 2022-09-21 | End: 2022-09-29 | Stop reason: HOSPADM

## 2022-09-21 RX ORDER — MAGNESIUM SULFATE HEPTAHYDRATE 40 MG/ML
2 INJECTION, SOLUTION INTRAVENOUS ONCE
Status: COMPLETED | OUTPATIENT
Start: 2022-09-21 | End: 2022-09-21

## 2022-09-21 RX ADMIN — THIAMINE HCL TAB 100 MG 100 MG: 100 TAB at 08:11

## 2022-09-21 RX ADMIN — PANTOPRAZOLE SODIUM 40 MG: 40 TABLET, DELAYED RELEASE ORAL at 08:10

## 2022-09-21 RX ADMIN — Medication 1 TABLET: at 08:11

## 2022-09-21 RX ADMIN — MAGNESIUM SULFATE IN WATER 2 G: 40 INJECTION, SOLUTION INTRAVENOUS at 09:01

## 2022-09-21 RX ADMIN — DIGOXIN 125 MCG: 125 TABLET ORAL at 08:10

## 2022-09-21 RX ADMIN — METHYLPHENIDATE HYDROCHLORIDE 5 MG: 5 TABLET ORAL at 14:33

## 2022-09-21 RX ADMIN — QUETIAPINE 25 MG: 25 TABLET ORAL at 12:05

## 2022-09-21 RX ADMIN — SALINE NASAL SPRAY 2 SPRAY: 1.5 SOLUTION NASAL at 18:37

## 2022-09-21 RX ADMIN — HYDRALAZINE HYDROCHLORIDE 50 MG: 50 TABLET, FILM COATED ORAL at 21:08

## 2022-09-21 RX ADMIN — Medication 3 MG: at 23:23

## 2022-09-21 RX ADMIN — VANCOMYCIN HYDROCHLORIDE 125 MG: 125 CAPSULE ORAL at 08:14

## 2022-09-21 RX ADMIN — VANCOMYCIN HYDROCHLORIDE 125 MG: 125 CAPSULE ORAL at 21:08

## 2022-09-21 RX ADMIN — HYDRALAZINE HYDROCHLORIDE 50 MG: 50 TABLET, FILM COATED ORAL at 08:14

## 2022-09-21 RX ADMIN — SALINE NASAL SPRAY 2 SPRAY: 1.5 SOLUTION NASAL at 06:00

## 2022-09-21 RX ADMIN — WARFARIN SODIUM 7.5 MG: 7.5 TABLET ORAL at 17:22

## 2022-09-21 RX ADMIN — ACETAMINOPHEN 975 MG: 325 TABLET, FILM COATED ORAL at 05:42

## 2022-09-21 RX ADMIN — SERTRALINE HYDROCHLORIDE 150 MG: 100 TABLET ORAL at 08:10

## 2022-09-21 RX ADMIN — HYDROXYCHLOROQUINE SULFATE 200 MG: 200 TABLET, FILM COATED ORAL at 21:08

## 2022-09-21 RX ADMIN — HYDRALAZINE HYDROCHLORIDE 50 MG: 50 TABLET, FILM COATED ORAL at 14:33

## 2022-09-21 RX ADMIN — TAMSULOSIN HYDROCHLORIDE 0.4 MG: 0.4 CAPSULE ORAL at 08:10

## 2022-09-21 RX ADMIN — METHYLPHENIDATE HYDROCHLORIDE 5 MG: 5 TABLET ORAL at 08:10

## 2022-09-21 RX ADMIN — Medication 150 MG: at 21:07

## 2022-09-21 RX ADMIN — METHYLCELLULOSE 500 MG: 500 TABLET ORAL at 10:19

## 2022-09-21 RX ADMIN — QUETIAPINE 25 MG: 25 TABLET ORAL at 08:10

## 2022-09-21 RX ADMIN — VANCOMYCIN HYDROCHLORIDE 125 MG: 125 CAPSULE ORAL at 17:22

## 2022-09-21 RX ADMIN — ACETAMINOPHEN 975 MG: 325 TABLET, FILM COATED ORAL at 18:37

## 2022-09-21 RX ADMIN — ATORVASTATIN CALCIUM 40 MG: 40 TABLET, FILM COATED ORAL at 21:08

## 2022-09-21 RX ADMIN — SALINE NASAL SPRAY 2 SPRAY: 1.5 SOLUTION NASAL at 10:20

## 2022-09-21 RX ADMIN — VANCOMYCIN HYDROCHLORIDE 125 MG: 125 CAPSULE ORAL at 12:05

## 2022-09-21 RX ADMIN — LISINOPRIL 10 MG: 10 TABLET ORAL at 08:09

## 2022-09-21 RX ADMIN — HYDROXYCHLOROQUINE SULFATE 200 MG: 200 TABLET, FILM COATED ORAL at 08:13

## 2022-09-21 RX ADMIN — METHYLCELLULOSE 500 MG: 500 TABLET ORAL at 08:11

## 2022-09-21 ASSESSMENT — ACTIVITIES OF DAILY LIVING (ADL)
ADLS_ACUITY_SCORE: 33
ADLS_ACUITY_SCORE: 36
ADLS_ACUITY_SCORE: 33
ADLS_ACUITY_SCORE: 36
ADLS_ACUITY_SCORE: 33
ADLS_ACUITY_SCORE: 33
ADLS_ACUITY_SCORE: 36
ADLS_ACUITY_SCORE: 33

## 2022-09-21 NOTE — PROGRESS NOTES
[facility]    Cardiology Progress Note- Cardiology 2        Date of Admission:  2022     Assessment & Plan: HVSL   Dandy EDUARD Sands is a 61 year old male with a PMHx of SLE, antiphospholipid syndrome, pulmonary embolism (on warfarin), ICM (EF 10-15%) s/p HM3 LVAD (22), and C. Diff infection (on PO vancomycin taper) who is currently admitted 2022 for low flow alarms and ongoing profuse diarrhea. Continuing po vancomycin, administering fluids as needed, encouraging fluids, trialing stool bulking agents.    Changes today :   - continues to have low flow alarms with getting up, though stools are more formed and less frequent  - trialing methylcellulose as a stool bulking agent after curb-siding GI  - po intake ~adequate so far per calorie counts  - 2g Mg ordered for Mg of 1.8  - If he continues to have low flow alarms, would consider TTE sitting and standing.  - INR subtherapeutic at 1.92- discussed with pharmacy, should be therapeutic tomorrow  (INR goal 2-3)    Diarrhea  C. Diff infection (positive 2022)  Prior to admit patient was on a 14-day course of 125 mg PO vancomycin qid which was set to go through 22. Patient denies any missed doses. He states he initially improved with PO vancomycin, but over the past day prior to admission his symptoms have been worsening (3 episodes of non-bloody diarrhea per day, low energy, lack of appetite). CT A/P in the ED negative for toxic megacolon. ID thinks diarrhea is less likely to be from recurrent C. Diff. Trialing stool bulking agents. Stools are loose, but more formed. Still having low flow alarms. If he continues to have low flow alarms, would consider TTE sitting and standing.  - ID consult, appreciate recs   -regular diet without fluid restriction  - Calorie counts  - encouraged fluids  - continue pta vancomycin 125 mg QID (14-day course set to  after 2022)  - curb-sided Rheum-  holding mycophenolate in the setting of infection, can reach out to Rheum closer to discharge to discuss how long to hold mycophenolate  - curb-sided GI- trialing methylcellulose for stool bulking (currently at 1,000mg daily, can increase as tolerated)    Stage D HFrEF 2/2 ICM (EF 10-15%) s/p HM3 LVAD (7/28/22) and CRT-D ('06)  LVAD low flow events  CAD s/p PCI to LAD ('05)  - LVAD: LVAD speed 5100 (reduced from 5200 9/19/2022)  - Volume status: Mildly hypovolemic to euvolemic (weight on 9/15 discharge 134 lbs, weight this admit 132 lbs)   - GDMT   > BB: Discontinued during previous admission with cardiogenic shock   > ACEi/ARB/ARNi: PTA lisinopril 10 mg daily   > MRA: None   > SGLT2i: None  - SCD ppx: s/p CRT-D (2006)  - AC: Pharmacy to dose warfarin    > on 5 mg daily at home (INR goal 2-3)  - PTA digoxin 125 mcg daily  - PTA atorvastatin 40 mg daily  - Telemetry monitoring  - Strict I/O   - Daily weights    HTN  - PTA hydralazine 50 mg TID  - PTA lisinopril 10 mg daily    Depression  MIGUEL  Has had low mood with multiple hospitalizations post LVAD.   - Psychiatry consult, appreciate recs  - Psychology consult, appreciate recs  - PTA seroquel 25 mg QAM, 25 mg at noon, 150 mg QPM  - increased sertraline from 100 mg daily to 150mg daily per Psychiatry recs (note that this can worsen diarrhea)  - continue Ritalin 5mg in the morning and early afternoon per Psychiatry recs- started this admission (started 9/20, can increase to 10mg per dose in a few days if tolerated)    GERD  Dysphagia  - PTA pantoprazole 40mg daily    SLE  APS  Hx of DVT/PE  On warfarin PTA (INR goal 2-3). Decreased to 5 mg daily during last admission due to mucus membrane bleeding.  - Pharmacy to dose warfarin  - PTA hydroxychloroquine 200mg daily  - HOLDING PTA mycophenolate 1500mg bid in the setting of infection (discussed this with Rheum as above)    Risk for malnutrition   - Nutrition consult  - PT/OT consult  - PTA thiamine 100 mg    BPH  - PTA  tamsulosin 0.4mg daily    Mild-moderate emphysema  - Not currently using any PTA inhalers      Diet:  Regular diet  DVT Prophylaxis: Warfarin  Fitzgerald Catheter: Not present  Code Status:  Full code       Disposition Plan   Expected discharge: 1-3 days, recommended to prior living arrangement once diarrhea improvement, reduction in low flow alarms.      Entered: Austin Can MD 09/21/2022, 8:28 AM       The patient's care was discussed with the Attending Physician, Dr. Lawrence.    Austin Can MD  [facility]    ______________________________________________________________________    Interval History   NAEON. Reports that he had 1 episode of diarrhea overnight and that it was more formed and less watery. He thinks that the lightheadedness with getting up has gotten a little better. Has been trying to drink fluids, will plan to order Gatorade with meals. No fevers, chills, chest pain, shortness of breath, abdominal pain, nausea, vomiting.    Had 2 low flow alarms this morning when sitting up.    Data reviewed today: I reviewed all medications, new labs and imaging results over the last 24 hours.     Physical Exam   Vital Signs: Temp: 97.8  F (36.6  C) Temp src: Oral BP: 92/83 Pulse: 85   Resp: 16 SpO2: 96 % O2 Device: None (Room air)    Weight: 135 lbs 1.6 oz  General: lying in bed, NAD  HEENT: no scleral icterus or conjunctival injection, oropharynx clear  Resp: clear to auscultation bilaterally, no crackles or wheezes  Cardiac: LVAD hum. JVD at base of neck lying at 20 degrees.  Abdomen: soft, non-tender, non-distended. No rebound or guarding. Driveline site with gauze without swelling or tenderness.   Extremities: warm, no lower extremity edema  MSK: cachetic   Skin: no lesions on exposed skin   Neuro: alert, answers questions appropriately  Psych: appropriate mood and affect    Data

## 2022-09-21 NOTE — PROGRESS NOTES
Calorie Count  Intake recorded for: 9/20  Total Kcals: 1416 Total Protein: 56g  Kcals from Hospital Food: 1416   Protein: 56g  Kcals from Outside Food (average):0 Protein: 0g  # Meals Ordered from Kitchen: 3 meals  # Meals Recorded: 2 meals (100% cheese burger, 2 bags of lays chips, special K bar)      (100% dinner roll, mashed potatoes w/ gravy, popsicle, 50% pollock w/ tartar sauce)  # Supplements Recorded: 0

## 2022-09-21 NOTE — PLAN OF CARE
Admitted (9/18) for low flow alarms and ongoing profuse diarrhea. PMHx of SLE, antiphospholipid syndrome, pulmonary embolism (on warfarin), ICM (EF 10-15%) s/p HM3 LVAD (7/22), and C. Diff infection (on PO vancomycin taper)     Neuro: AOx4, Calm and cooperative. Pt endorses depression (Ritalin initiated and Zoloft dose increased). No neurological deficits.  Cardiac/Tele: Paced, HR in the 90's, Doppler MAPs 60's-70's, LVAD numbers WNL. No alarms. Other VSS  Respiratory: Room air tolerates well.Sats are > 90%.  GI/: Voids without difficulties, Adequate UOP. Pt has diarrhea. LBM soft and formed.Citrucel added to pt regime to add bulk to stools.  Diet/Appetite: Regular diet. No FR. Encourage oral intake. Pt on Kcal counts  Skin: LVAD driveline site. No other deficits  Endocrine: No BG checks  LDAs: PIV salined locked   Activity: Up SBA  Pain: Endorses some soreness. Tylenol x1     Plan: Continue POC. Notify team of any concerns

## 2022-09-21 NOTE — CONSULTS
"  Health Psychology                                                                                                                          Shante Anne, Ph.D., L.P [phone]  Rocio Conner, Ph.D., L.P. [phone]  Faye Myers, Ph.D, L..P. [phone]  Chantal Monsalve, Ph.D., L.P. [phone]  Delbert Helton, Ph.D., A.B.P.P., L.P. [phone]         Symone Zeng, Ph.D., L.P. [phone]       Radha Torres, Ph.D., A.B.P.P., LP [phone]           [facility], 3rd Floor  [address] [place] [address]      Inpatient Health Psychology Consultation    Date of Service:  09/21/22    BACKGROUND:  Per EMR: Dandy Sands is a 61 year old male with a PMHx of SLE, antiphospholipid syndrome, pulmonary embolism (on warfarin), ICM (EF 10-15%) s/p HM3 LVAD (7/28/22), and C. Diff infection (on PO vancomycin taper) who is currently admitted 9/18/2022 for low flow alarms and ongoing profuse diarrhea. Continuing po vancomycin, administering fluids as needed, encouraging fluids, trialing stool bulking agents.    Health psychology consulted by CARDS II based on recommendation from  to support Dandy in coping with re-admission and emotional stress of this and how it impacts his children. Psychiatry met with Dandy yesterday including Machelle Young [facility], for brief diagnostic assessment.    SUBJECTIVE:  Met with Dandy to introduce Health Psychology services and discuss nature of referral. Dandy described experiences with being in [place] for so long away from home. We reviewed re-admission and how his diarrhea symptoms are currently. He is grateful that symptoms are improving and hopeful he can discharge in the near future. He met with a peer patient yesterday who has also had an LVAD which Dandy said was very helpful.  The main themes were \"patience\" and that setbacks can happen in the recovery process.  This was comforting for Dandy although can also contribute " to his feeling like a burden as it means he will continue to need support.     Validated Dandy's emotional experiences and highlighted his motivation to increase independence so he will no longer depend on his children. He shared information about his son and daughter and his new 2 month old grandchild. This writer discussed strategies to help Dandy mitigate negative and self-blaming thoughts so they do not contribute to reduced engagement in recovery or other negative consequences.  Provided examples of ways to target unhelpful thoughts and shift them. Also highlighted that he clearly cares deeply for his children and that this is also a strength.    Encouraged ongoing engagement in his recovery. He had taken a walk prior to our meeting today. This writer did not specify that there are documented concerns about his engagement from family but we discussed strategies to increase activity while in the apartment and limit isolating behaviors. We can continue to discuss this as well should Dandy remain admitted.     Discussed what Dandy is looking forward to with returning home. He said his children want him to move to [place] but he wants to stay in his home in [place] because he does not want to pay rent for an apartment. Returning home will be the first time he has been there and been retired so he knows this may be a challenging adjustment.     Dandy was engaged in the visit and expressed understanding of information provided. He agreed to follow-up.     OBJECTIVE:  Dandy was lying in his bed watching TV when this writer entered his room. He was alert and oriented.  He reported fair mood, affect was mood- and thought-congruent. Thought processes logical and linear. Insight and judgment fair to good. Speech WNL. Denied suicidal ideation.        ASSESSMENT:  Dandy has presented with symptoms of anxiety and depression in context of prolonged admission and acute medical condition. Currently, he is exhibited more  depressive symptoms as he feels guilty for how his healthcare needs are impacting his children. While in the hospital his depressive symptoms do not seem to be impacting engagement in therapies/care but there is mention that when he was home he was isolating himself more. Difficult to tell if this was due to diarrhea/weakness.  He disclosed to this writer he worries about his children getting Cdiff now so we want to make sure this does not contribute to excessive isolation.      DIAGNOSIS:  Adjustment disorder with mixed anxiety and depressed mood      PLAN:  Health Psych can follow-up next week should he remain admitted    Please reiterate information about Cdiff prevention to help him manage any anxiety about his children mejia Cdiff. He named this as an anxiety.       Faye Myers, PhD, LP  Clinical Health Psychologist  Phone: [phone]  Pager: [phone]      Time in: 3:40  Time out: 4:10

## 2022-09-21 NOTE — PROVIDER NOTIFICATION
Notified Cards 2 provider about patient's low flow alarms x2 this around 0800 when patient sat up in bed (flows around 2.3-2.6). Pt asymptomatic. Other LVAD #s WNL.

## 2022-09-21 NOTE — PLAN OF CARE
D: Admitted 9/18 for low flow alarms and ongoing profuse diarrhea.  Hx: SLE, antiphospholipid syndrome, pulmonary embolism (on warfarin), ICM (EF 10-15%) s/p HM3 LVAD (7/28/22), and C. Diff infection (on PO vancomycin taper)    I: Monitored vitals and assessed pt status.   PRN: Tylenol 975mg x1    A: A0x4. VSS, on RA. V-paced. Afebrile. Pain in back/right shoulder, PRN tylenol given. Mg replaced via IV. LBM 9/21 (x2, soft). Voiding adequately via urinal. Regular diet with calorie counts. SBA. Daily driveline dressing completed, no drainage. Sternal incision and old CT sites JOSE ENRIQUE and healed.   LVAD #s WNL except for low flows, pt had x3 low flow alarms while writer was in room, Cards 2 notified and aware, pt usually asymptomatic (was symptomatic once) and encouraged to drink fluids.     P: Report changes to Cards 2 team.

## 2022-09-21 NOTE — PROGRESS NOTES
D- 3 LVAD pt admitted on 09/19/22 with fatigue, diarrhea, and low flow alarms. C.-diff positive. One stool this shift, formed, soft, brown. Over 1L of po fluids this shift. C/o R shoulder pain, not relieved by scheduled tylenol.  I-Continue on po vancomycin. Reminded pt to call for assistance to get OOB. Offered ice/heat for shoulder pain. Pt requesting oxycodone. Discussed with Cards 2 cross cover provider.  Awaiting orders.  A-Pt had low flow alarms when working with therapy on previous shift. But no low flow alarms on this shift.  P-Continue with current poc. Notify provider of any other concerns/changes.

## 2022-09-22 ENCOUNTER — APPOINTMENT (OUTPATIENT)
Dept: PHYSICAL THERAPY | Facility: CLINIC | Age: 62
DRG: 393 | End: 2022-09-22
Payer: COMMERCIAL

## 2022-09-22 LAB
ANION GAP SERPL CALCULATED.3IONS-SCNC: 11 MMOL/L (ref 7–15)
BASOPHILS # BLD AUTO: 0 10E3/UL (ref 0–0.2)
BASOPHILS NFR BLD AUTO: 1 %
BUN SERPL-MCNC: 20.1 MG/DL (ref 8–23)
CALCIUM SERPL-MCNC: 9 MG/DL (ref 8.8–10.2)
CHLORIDE SERPL-SCNC: 105 MMOL/L (ref 98–107)
CREAT SERPL-MCNC: 0.64 MG/DL (ref 0.67–1.17)
DEPRECATED HCO3 PLAS-SCNC: 18 MMOL/L (ref 22–29)
EOSINOPHIL # BLD AUTO: 0.1 10E3/UL (ref 0–0.7)
EOSINOPHIL NFR BLD AUTO: 2 %
ERYTHROCYTE [DISTWIDTH] IN BLOOD BY AUTOMATED COUNT: 16.9 % (ref 10–15)
GFR SERPL CREATININE-BSD FRML MDRD: >90 ML/MIN/1.73M2
GLUCOSE SERPL-MCNC: 88 MG/DL (ref 70–99)
HCT VFR BLD AUTO: 31.5 % (ref 40–53)
HGB BLD-MCNC: 9.8 G/DL (ref 13.3–17.7)
IMM GRANULOCYTES # BLD: 0 10E3/UL
IMM GRANULOCYTES NFR BLD: 1 %
INR PPP: 1.91 (ref 0.85–1.15)
LYMPHOCYTES # BLD AUTO: 1 10E3/UL (ref 0.8–5.3)
LYMPHOCYTES NFR BLD AUTO: 19 %
MAGNESIUM SERPL-MCNC: 1.9 MG/DL (ref 1.7–2.3)
MCH RBC QN AUTO: 29.3 PG (ref 26.5–33)
MCHC RBC AUTO-ENTMCNC: 31.1 G/DL (ref 31.5–36.5)
MCV RBC AUTO: 94 FL (ref 78–100)
MONOCYTES # BLD AUTO: 0.6 10E3/UL (ref 0–1.3)
MONOCYTES NFR BLD AUTO: 11 %
NEUTROPHILS # BLD AUTO: 3.5 10E3/UL (ref 1.6–8.3)
NEUTROPHILS NFR BLD AUTO: 66 %
NRBC # BLD AUTO: 0 10E3/UL
NRBC BLD AUTO-RTO: 0 /100
PLATELET # BLD AUTO: 203 10E3/UL (ref 150–450)
POTASSIUM SERPL-SCNC: 4.3 MMOL/L (ref 3.4–5.3)
RBC # BLD AUTO: 3.34 10E6/UL (ref 4.4–5.9)
SODIUM SERPL-SCNC: 134 MMOL/L (ref 136–145)
WBC # BLD AUTO: 5.2 10E3/UL (ref 4–11)

## 2022-09-22 PROCEDURE — 250N000013 HC RX MED GY IP 250 OP 250 PS 637

## 2022-09-22 PROCEDURE — 93750 INTERROGATION VAD IN PERSON: CPT | Performed by: INTERNAL MEDICINE

## 2022-09-22 PROCEDURE — 83735 ASSAY OF MAGNESIUM: CPT

## 2022-09-22 PROCEDURE — 250N000013 HC RX MED GY IP 250 OP 250 PS 637: Performed by: INTERNAL MEDICINE

## 2022-09-22 PROCEDURE — 250N000011 HC RX IP 250 OP 636

## 2022-09-22 PROCEDURE — 97110 THERAPEUTIC EXERCISES: CPT | Mod: GP

## 2022-09-22 PROCEDURE — 999N000248 HC STATISTIC IV INSERT WITH US BY RN

## 2022-09-22 PROCEDURE — 82310 ASSAY OF CALCIUM: CPT

## 2022-09-22 PROCEDURE — 97530 THERAPEUTIC ACTIVITIES: CPT | Mod: GP

## 2022-09-22 PROCEDURE — 85025 COMPLETE CBC W/AUTO DIFF WBC: CPT

## 2022-09-22 PROCEDURE — 99233 SBSQ HOSP IP/OBS HIGH 50: CPT | Mod: 25 | Performed by: INTERNAL MEDICINE

## 2022-09-22 PROCEDURE — 85610 PROTHROMBIN TIME: CPT

## 2022-09-22 PROCEDURE — 214N000001 HC R&B CCU UMMC

## 2022-09-22 PROCEDURE — 36415 COLL VENOUS BLD VENIPUNCTURE: CPT

## 2022-09-22 RX ORDER — HYDROXYZINE HYDROCHLORIDE 25 MG/1
25 TABLET, FILM COATED ORAL EVERY 6 HOURS PRN
Status: DISCONTINUED | OUTPATIENT
Start: 2022-09-22 | End: 2022-09-29 | Stop reason: HOSPADM

## 2022-09-22 RX ORDER — METHYLPHENIDATE HYDROCHLORIDE 5 MG/1
5 TABLET ORAL 2 TIMES DAILY
Status: DISCONTINUED | OUTPATIENT
Start: 2022-09-22 | End: 2022-09-29 | Stop reason: HOSPADM

## 2022-09-22 RX ORDER — WARFARIN SODIUM 10 MG/1
10 TABLET ORAL
Status: COMPLETED | OUTPATIENT
Start: 2022-09-22 | End: 2022-09-22

## 2022-09-22 RX ORDER — MAGNESIUM SULFATE HEPTAHYDRATE 40 MG/ML
2 INJECTION, SOLUTION INTRAVENOUS ONCE
Status: COMPLETED | OUTPATIENT
Start: 2022-09-22 | End: 2022-09-22

## 2022-09-22 RX ADMIN — PANTOPRAZOLE SODIUM 40 MG: 40 TABLET, DELAYED RELEASE ORAL at 08:24

## 2022-09-22 RX ADMIN — ACETAMINOPHEN 975 MG: 325 TABLET, FILM COATED ORAL at 12:21

## 2022-09-22 RX ADMIN — METHYLCELLULOSE 500 MG: 500 TABLET ORAL at 09:55

## 2022-09-22 RX ADMIN — DIGOXIN 125 MCG: 125 TABLET ORAL at 08:23

## 2022-09-22 RX ADMIN — SALINE NASAL SPRAY 2 SPRAY: 1.5 SOLUTION NASAL at 09:55

## 2022-09-22 RX ADMIN — HYDRALAZINE HYDROCHLORIDE 50 MG: 50 TABLET, FILM COATED ORAL at 08:24

## 2022-09-22 RX ADMIN — METHYLPHENIDATE HYDROCHLORIDE 5 MG: 5 TABLET ORAL at 12:20

## 2022-09-22 RX ADMIN — MAGNESIUM SULFATE HEPTAHYDRATE 2 G: 40 INJECTION, SOLUTION INTRAVENOUS at 16:27

## 2022-09-22 RX ADMIN — HYDRALAZINE HYDROCHLORIDE 50 MG: 50 TABLET, FILM COATED ORAL at 15:16

## 2022-09-22 RX ADMIN — SALINE NASAL SPRAY 2 SPRAY: 1.5 SOLUTION NASAL at 15:17

## 2022-09-22 RX ADMIN — Medication 150 MG: at 20:19

## 2022-09-22 RX ADMIN — METHYLPHENIDATE HYDROCHLORIDE 5 MG: 5 TABLET ORAL at 08:25

## 2022-09-22 RX ADMIN — QUETIAPINE 25 MG: 25 TABLET ORAL at 12:20

## 2022-09-22 RX ADMIN — HYDRALAZINE HYDROCHLORIDE 50 MG: 50 TABLET, FILM COATED ORAL at 20:20

## 2022-09-22 RX ADMIN — SALINE NASAL SPRAY 2 SPRAY: 1.5 SOLUTION NASAL at 17:36

## 2022-09-22 RX ADMIN — LISINOPRIL 10 MG: 10 TABLET ORAL at 08:25

## 2022-09-22 RX ADMIN — ATORVASTATIN CALCIUM 40 MG: 40 TABLET, FILM COATED ORAL at 20:20

## 2022-09-22 RX ADMIN — TAMSULOSIN HYDROCHLORIDE 0.4 MG: 0.4 CAPSULE ORAL at 08:25

## 2022-09-22 RX ADMIN — SALINE NASAL SPRAY 2 SPRAY: 1.5 SOLUTION NASAL at 20:22

## 2022-09-22 RX ADMIN — METHYLCELLULOSE 1000 MG: 500 TABLET ORAL at 08:25

## 2022-09-22 RX ADMIN — Medication 1 TABLET: at 08:25

## 2022-09-22 RX ADMIN — WARFARIN SODIUM 10 MG: 10 TABLET ORAL at 17:35

## 2022-09-22 RX ADMIN — HYDROXYCHLOROQUINE SULFATE 200 MG: 200 TABLET, FILM COATED ORAL at 08:24

## 2022-09-22 RX ADMIN — SERTRALINE HYDROCHLORIDE 150 MG: 100 TABLET ORAL at 08:24

## 2022-09-22 RX ADMIN — HYDROXYCHLOROQUINE SULFATE 200 MG: 200 TABLET, FILM COATED ORAL at 20:20

## 2022-09-22 RX ADMIN — THIAMINE HCL TAB 100 MG 100 MG: 100 TAB at 08:24

## 2022-09-22 RX ADMIN — QUETIAPINE 25 MG: 25 TABLET ORAL at 08:25

## 2022-09-22 ASSESSMENT — ACTIVITIES OF DAILY LIVING (ADL)
ADLS_ACUITY_SCORE: 36
ADLS_ACUITY_SCORE: 34
ADLS_ACUITY_SCORE: 36

## 2022-09-22 NOTE — PROGRESS NOTES
Calorie Count  Intake recorded for: 9/21  Total Kcals: 1864 Total Protein: 63g  Kcals from Hospital Food: 1864   Protein: 63g  Kcals from Outside Food (average):0  Protein: 0g  # Meals Ordered from Kitchen: 3 + snack from nursing  # Meals Recorded: 3 meals  (First - 100% hamburger rossy w/ gravy, mashed potatoes w/ gravy, 1 wheat dinner roll, 8oz gatorade)  (Second - 100% 4 slices garcia, 2 wheat dinner roll w/ 2 butters, 8oz gatorade, popsicle - from nursing)  (Third - 100% turkey breast w/ gravy, side caesar salad, pudding, regular lay's chips, 25% gatorade)  # Supplements Recorded: No intake recorded

## 2022-09-22 NOTE — PROGRESS NOTES
[facility]    Cardiology Progress Note- Cardiology 2        Date of Admission:  9/18/2022     Assessment & Plan: HVSL   Dandy EDUARD Sands is a 61 year old male with a PMHx of SLE, antiphospholipid syndrome, pulmonary embolism (on warfarin), ICM (EF 10-15%) s/p HM3 LVAD (7/28/22), and C. Diff infection (on PO vancomycin taper) who is currently admitted 9/18/2022 for low flow alarms and ongoing profuse diarrhea. Completed course of po vancomycin, encouraging fluids, and trialing stool bulking agents with improvement in diarrhea and lightheadedness, but still having low flow alarms.     Changes today 9/22:   - continues to have low flow alarms  - trialing methylcellulose for stool bulking (currently at 1,500mg daily, can increase as tolerated)  - Ordered TTE sitting and standing to assess LVAD  - adjusted Ritalin afternoon dose from 2pm to 1pm to see if it helps with sleep  - talked with pharmacy regarding subtherapeutic INR of 1.91 (goal 2-3); warfarin dose in evening will be increased.     LVAD low flow alarms  Diarrhea, improving  C. Diff infection (positive 9/7/2022), treated  Prior to admit patient was on a 14-day course of 125 mg PO vancomycin qid which was set to go through 9/21/22. Patient denies any missed doses. He states he initially improved with PO vancomycin, but over the past day prior to admission his symptoms have been worsening (3 episodes of non-bloody diarrhea per day, low energy, lack of appetite). CT A/P in the ED negative for toxic megacolon. ID thinks diarrhea is less likely to be from recurrent C. Diff. Trialing stool bulking agents. Overall, diarrhea and lightheadedness have been improving. Still having low flow alarms.   - ID consult, appreciate recs   - regular diet without fluid restriction  - Calorie counts  - encouraged fluids  - completed 14-day course of po vancomycin 125 mg QID (last day  9/21/2022)  - curb-sided Rheum- holding  mycophenolate in the setting of infection, can reach out to Rheum closer to discharge to discuss how long to hold mycophenolate  - curb-sided GI- trialing methylcellulose for stool bulking (currently at 1,500mg daily, can increase as tolerated)  - Ordered TTE sitting and standing to assess LVAD    Stage D HFrEF 2/2 ICM (EF 10-15%) s/p HM3 LVAD (7/28/22) and CRT-D ('06)  LVAD low flow events  CAD s/p PCI to LAD ('05)  - LVAD: LVAD speed 5100 (reduced from 5200 9/19/2022)  - Volume status: Mildly hypovolemic to euvolemic (weight on 9/15 discharge 134 lbs, weight this admit 132 lbs)   - GDMT   > BB: Discontinued during previous admission with cardiogenic shock   > ACEi/ARB/ARNi: PTA lisinopril 10 mg daily   > MRA: None    > SGLT2i: None   - SCD ppx: s/p CRT-D (2006)  - AC: Pharmacy to dose warfarin    > on 5 mg daily at home (INR goal 2-3)  - PTA digoxin 125 mcg daily  - PTA atorvastatin 40 mg daily  - Telemetry monitoring  - Strict I/O   - Daily weights     HTN  - PTA hydralazine 50 mg TID  - PTA lisinopril 10 mg daily    Depression  MIGUEL  Has had low mood with multiple hospitalizations post LVAD.   - Psychiatry consult, appreciate recs  - Psychology consult, appreciate recs  - PTA seroquel 25 mg QAM, 25 mg at noon, 150 mg QPM  - continue sertraline 150mg daily (adjusted this admission per psych, previously was on 100mg daily)  - continue Ritalin 5mg in the morning and early afternoon per Psychiatry recs- started this admission (started 9/20, can increase to 10mg per dose in a few days if tolerated)  - PTA hydroxyzine prn    GERD  Dysphagia  - PTA pantoprazole 40mg daily    SLE  APS  Hx of DVT/PE  On warfarin PTA (INR goal 2-3). Decreased to 5 mg daily during last admission due to mucus membrane bleeding.  - Pharmacy to dose warfarin  - PTA hydroxychloroquine 200mg daily  - HOLDING PTA mycophenolate 1500mg bid in the setting of infection (discussed this with Rheum as above)    Severe malnutrition in the context of  acute on chronic illness  - Nutrition consult  - PT/OT consult  - PTA thiamine 100 mg    BPH  - PTA tamsulosin 0.4mg daily    Mild-moderate emphysema  - Not currently using any PTA inhalers      Diet:  Regular diet  DVT Prophylaxis: Warfarin  Fitzgerald Catheter: Not present  Code Status:  Full code       Disposition Plan   Expected discharge: 1-2 days, recommended to prior living arrangement once diarrhea improvement, reduction in low flow alarms.      Entered: Austin Can MD 09/22/2022, 8:56 AM       The patient's care was discussed with the Attending Physician, Dr. Lawrence.    Austin Can MD  [facility]    ______________________________________________________________________    Interval History   NAEON. Nursing notes reviewed. Had loose but not watery bowel movements yesterday 9/21 (once after lunch and once after dinner). Reports that lightheadedness continues to improve a little. Drank about 2 liters of water yesterday 9/21 and has also been drinking Gatorade with meals. Had some difficulty sleeping the last 2 nights. Didn't fall asleep until 1am last night. No fevers, chills, chest pain, shortness of breath, abdominal pain, nausea, vomiting.    No flow alarms overnight, did have multiple low flow alarms yesterday afternoon 9/21. He reports being asymptomatic when the alarms go off, but they occur when he sits up.     Data reviewed today: I reviewed all medications, new labs and imaging results over the last 24 hours.     Physical Exam   Vital Signs: Temp: 97.8  F (36.6  C) Temp src: Oral BP: (!) 89/75 Pulse: 89   Resp: 16 SpO2: 97 % O2 Device: None (Room air)    Weight: 132 lbs 0 oz  General: lying in bed, NAD  HEENT: no scleral icterus or conjunctival injection, oropharynx clear  Resp: clear to auscultation bilaterally, no crackles or wheezes  Cardiac: LVAD hum. JVD at base of neck lying at 20 degrees.  Abdomen: soft, non-tender, non-distended. No rebound or  guarding. Driveline site with gauze without swelling or tenderness.   Extremities: warm, no lower extremity edema  MSK: cachetic   Neuro: alert, answers questions appropriately  Psych: appropriate mood and affect    Data

## 2022-09-22 NOTE — PLAN OF CARE
D: Admitted 9/18 for low flow alarms and ongoing profuse diarrhea.  Hx: SLE, antiphospholipid syndrome, pulmonary embolism (on warfarin), ICM (EF 10-15%) s/p HM3 LVAD (7/28/22), and C. Diff infection (on PO vancomycin taper)    I: Monitored vitals and assessed pt status.   PRN: Tylenol 975mg    A: A0x4. VSS, on RA. SR/sinus arrhythmia with BBB, irregular at times. Afebrile. Pain in back/right shoulder, PRN tylenol given. Mg replaced via IV. LBM 9/22 (x1, soft). Voiding adequately via urinal. Regular diet with calorie counts. SBA. Daily driveline dressing completed, no drainage. Sternal incision and old CT sites JOSE ENRIQUE and healed.   LVAD #s WNL except for low flows, pt had x2 low flow alarms while writer was in room, Cards 2 notified and aware, pt sometimes symptomatic and encouraged to drink fluids.     P: Report changes to Cards 2 team. ECHO tomorrow (ECHO attempt x2 and pt refused or was out with therapy).

## 2022-09-22 NOTE — PLAN OF CARE
Dx: admitted 9/8 for low flow alarm and diarrhea    Neuro: A&O x4. Reported minimal sleep; Melatonin given  Cardiac: SR 1  AVB/BBB. Map WNL. PI up to 7. No low flow alarm this shift.   Respiratory: On RA. LS CTAB.  GI/: No BM this shift. Voiding with good UOP.  Diet: Reg; Virgilio count.   Skin: No new skin deficit noted.    Pain: denied.  LDAs: L PIV  Electrolytes: Await AM labs  Mobility: Has not gotten out of bed this shift.    Plan: Monitor LVAd flow; team considering TTE sitting and standing for continuous low flow    Goal Outcome Evaluation:    Plan of Care Reviewed With: patient     Overall Patient Progress: no change    Outcome Evaluation: No BM overnight. Complains of lack of sleep

## 2022-09-23 ENCOUNTER — APPOINTMENT (OUTPATIENT)
Dept: PHYSICAL THERAPY | Facility: CLINIC | Age: 62
DRG: 393 | End: 2022-09-23
Payer: COMMERCIAL

## 2022-09-23 ENCOUNTER — MEDICAL CORRESPONDENCE (OUTPATIENT)
Dept: CARDIOLOGY | Facility: CLINIC | Age: 62
End: 2022-09-23

## 2022-09-23 ENCOUNTER — APPOINTMENT (OUTPATIENT)
Dept: CARDIOLOGY | Facility: CLINIC | Age: 62
DRG: 393 | End: 2022-09-23
Attending: HOSPITALIST
Payer: COMMERCIAL

## 2022-09-23 LAB
ANION GAP SERPL CALCULATED.3IONS-SCNC: 11 MMOL/L (ref 7–15)
BASOPHILS # BLD AUTO: 0.1 10E3/UL (ref 0–0.2)
BASOPHILS NFR BLD AUTO: 1 %
BUN SERPL-MCNC: 19.2 MG/DL (ref 8–23)
CALCIUM SERPL-MCNC: 9.2 MG/DL (ref 8.8–10.2)
CHLORIDE SERPL-SCNC: 104 MMOL/L (ref 98–107)
CREAT SERPL-MCNC: 0.74 MG/DL (ref 0.67–1.17)
DEPRECATED HCO3 PLAS-SCNC: 21 MMOL/L (ref 22–29)
EOSINOPHIL # BLD AUTO: 0.1 10E3/UL (ref 0–0.7)
EOSINOPHIL NFR BLD AUTO: 2 %
ERYTHROCYTE [DISTWIDTH] IN BLOOD BY AUTOMATED COUNT: 17 % (ref 10–15)
GFR SERPL CREATININE-BSD FRML MDRD: >90 ML/MIN/1.73M2
GLUCOSE SERPL-MCNC: 96 MG/DL (ref 70–99)
HCT VFR BLD AUTO: 31.7 % (ref 40–53)
HGB BLD-MCNC: 10 G/DL (ref 13.3–17.7)
HGB BLD-MCNC: 10.1 G/DL (ref 13.3–17.7)
HGB BLD-MCNC: 9.2 G/DL (ref 13.3–17.7)
IMM GRANULOCYTES # BLD: 0 10E3/UL
IMM GRANULOCYTES NFR BLD: 0 %
INR PPP: 1.79 (ref 0.85–1.15)
LVEF ECHO: NORMAL
LYMPHOCYTES # BLD AUTO: 1.1 10E3/UL (ref 0.8–5.3)
LYMPHOCYTES NFR BLD AUTO: 20 %
MAGNESIUM SERPL-MCNC: 1.9 MG/DL (ref 1.7–2.3)
MCH RBC QN AUTO: 29.5 PG (ref 26.5–33)
MCHC RBC AUTO-ENTMCNC: 31.9 G/DL (ref 31.5–36.5)
MCV RBC AUTO: 93 FL (ref 78–100)
MONOCYTES # BLD AUTO: 0.5 10E3/UL (ref 0–1.3)
MONOCYTES NFR BLD AUTO: 10 %
NEUTROPHILS # BLD AUTO: 3.6 10E3/UL (ref 1.6–8.3)
NEUTROPHILS NFR BLD AUTO: 67 %
NRBC # BLD AUTO: 0 10E3/UL
NRBC BLD AUTO-RTO: 0 /100
OXYHGB MFR BLDV: 54 % (ref 92–100)
OXYHGB MFR BLDV: 56 % (ref 92–100)
PLATELET # BLD AUTO: 218 10E3/UL (ref 150–450)
POTASSIUM SERPL-SCNC: 4.3 MMOL/L (ref 3.4–5.3)
RBC # BLD AUTO: 3.42 10E6/UL (ref 4.4–5.9)
SODIUM SERPL-SCNC: 136 MMOL/L (ref 136–145)
WBC # BLD AUTO: 5.4 10E3/UL (ref 4–11)

## 2022-09-23 PROCEDURE — 250N000013 HC RX MED GY IP 250 OP 250 PS 637: Performed by: INTERNAL MEDICINE

## 2022-09-23 PROCEDURE — 4A023N6 MEASUREMENT OF CARDIAC SAMPLING AND PRESSURE, RIGHT HEART, PERCUTANEOUS APPROACH: ICD-10-PCS | Performed by: INTERNAL MEDICINE

## 2022-09-23 PROCEDURE — 93451 RIGHT HEART CATH: CPT | Performed by: INTERNAL MEDICINE

## 2022-09-23 PROCEDURE — 99233 SBSQ HOSP IP/OBS HIGH 50: CPT | Mod: 25 | Performed by: INTERNAL MEDICINE

## 2022-09-23 PROCEDURE — 97116 GAIT TRAINING THERAPY: CPT | Mod: GP

## 2022-09-23 PROCEDURE — 250N000009 HC RX 250: Performed by: INTERNAL MEDICINE

## 2022-09-23 PROCEDURE — 272N000001 HC OR GENERAL SUPPLY STERILE: Performed by: INTERNAL MEDICINE

## 2022-09-23 PROCEDURE — 250N000013 HC RX MED GY IP 250 OP 250 PS 637

## 2022-09-23 PROCEDURE — 85610 PROTHROMBIN TIME: CPT

## 2022-09-23 PROCEDURE — 83735 ASSAY OF MAGNESIUM: CPT

## 2022-09-23 PROCEDURE — 80048 BASIC METABOLIC PNL TOTAL CA: CPT

## 2022-09-23 PROCEDURE — 93325 DOPPLER ECHO COLOR FLOW MAPG: CPT | Mod: 26 | Performed by: INTERNAL MEDICINE

## 2022-09-23 PROCEDURE — C1894 INTRO/SHEATH, NON-LASER: HCPCS | Performed by: INTERNAL MEDICINE

## 2022-09-23 PROCEDURE — 93451 RIGHT HEART CATH: CPT | Mod: 26 | Performed by: INTERNAL MEDICINE

## 2022-09-23 PROCEDURE — 82810 BLOOD GASES O2 SAT ONLY: CPT

## 2022-09-23 PROCEDURE — 93325 DOPPLER ECHO COLOR FLOW MAPG: CPT

## 2022-09-23 PROCEDURE — 36415 COLL VENOUS BLD VENIPUNCTURE: CPT

## 2022-09-23 PROCEDURE — 214N000001 HC R&B CCU UMMC

## 2022-09-23 PROCEDURE — 85018 HEMOGLOBIN: CPT

## 2022-09-23 PROCEDURE — 258N000003 HC RX IP 258 OP 636: Performed by: INTERNAL MEDICINE

## 2022-09-23 PROCEDURE — 93321 DOPPLER ECHO F-UP/LMTD STD: CPT | Mod: 26 | Performed by: INTERNAL MEDICINE

## 2022-09-23 PROCEDURE — 97530 THERAPEUTIC ACTIVITIES: CPT | Mod: GP

## 2022-09-23 PROCEDURE — 85025 COMPLETE CBC W/AUTO DIFF WBC: CPT

## 2022-09-23 PROCEDURE — 93308 TTE F-UP OR LMTD: CPT

## 2022-09-23 PROCEDURE — 93308 TTE F-UP OR LMTD: CPT | Mod: 26 | Performed by: INTERNAL MEDICINE

## 2022-09-23 RX ORDER — HYDRALAZINE HYDROCHLORIDE 25 MG/1
25 TABLET, FILM COATED ORAL 3 TIMES DAILY
Status: DISCONTINUED | OUTPATIENT
Start: 2022-09-23 | End: 2022-09-29 | Stop reason: HOSPADM

## 2022-09-23 RX ORDER — HYDRALAZINE HYDROCHLORIDE 25 MG/1
25 TABLET, FILM COATED ORAL 3 TIMES DAILY
Status: DISCONTINUED | OUTPATIENT
Start: 2022-09-23 | End: 2022-09-23

## 2022-09-23 RX ORDER — WARFARIN SODIUM 10 MG/1
10 TABLET ORAL
Status: COMPLETED | OUTPATIENT
Start: 2022-09-23 | End: 2022-09-23

## 2022-09-23 RX ORDER — HYDRALAZINE HYDROCHLORIDE 25 MG/1
25 TABLET, FILM COATED ORAL ONCE
Status: COMPLETED | OUTPATIENT
Start: 2022-09-23 | End: 2022-09-23

## 2022-09-23 RX ADMIN — HYDRALAZINE HYDROCHLORIDE 25 MG: 25 TABLET, FILM COATED ORAL at 17:00

## 2022-09-23 RX ADMIN — WARFARIN SODIUM 10 MG: 10 TABLET ORAL at 17:00

## 2022-09-23 RX ADMIN — ACETAMINOPHEN 975 MG: 325 TABLET, FILM COATED ORAL at 09:02

## 2022-09-23 RX ADMIN — Medication 1 TABLET: at 08:31

## 2022-09-23 RX ADMIN — HYDROXYCHLOROQUINE SULFATE 200 MG: 200 TABLET, FILM COATED ORAL at 21:34

## 2022-09-23 RX ADMIN — ATORVASTATIN CALCIUM 40 MG: 40 TABLET, FILM COATED ORAL at 21:34

## 2022-09-23 RX ADMIN — SALINE NASAL SPRAY 2 SPRAY: 1.5 SOLUTION NASAL at 17:01

## 2022-09-23 RX ADMIN — METHYLPHENIDATE HYDROCHLORIDE 5 MG: 5 TABLET ORAL at 12:17

## 2022-09-23 RX ADMIN — DIGOXIN 125 MCG: 125 TABLET ORAL at 08:31

## 2022-09-23 RX ADMIN — METHYLCELLULOSE 1500 MG: 500 TABLET ORAL at 08:30

## 2022-09-23 RX ADMIN — THIAMINE HCL TAB 100 MG 100 MG: 100 TAB at 08:31

## 2022-09-23 RX ADMIN — SALINE NASAL SPRAY 2 SPRAY: 1.5 SOLUTION NASAL at 11:37

## 2022-09-23 RX ADMIN — PANTOPRAZOLE SODIUM 40 MG: 40 TABLET, DELAYED RELEASE ORAL at 08:31

## 2022-09-23 RX ADMIN — SALINE NASAL SPRAY 2 SPRAY: 1.5 SOLUTION NASAL at 21:34

## 2022-09-23 RX ADMIN — Medication 150 MG: at 21:33

## 2022-09-23 RX ADMIN — SERTRALINE HYDROCHLORIDE 150 MG: 100 TABLET ORAL at 08:30

## 2022-09-23 RX ADMIN — HYDROXYZINE HYDROCHLORIDE 25 MG: 25 TABLET, FILM COATED ORAL at 21:34

## 2022-09-23 RX ADMIN — HYDROXYCHLOROQUINE SULFATE 200 MG: 200 TABLET, FILM COATED ORAL at 08:31

## 2022-09-23 RX ADMIN — QUETIAPINE 25 MG: 25 TABLET ORAL at 08:31

## 2022-09-23 RX ADMIN — LISINOPRIL 10 MG: 10 TABLET ORAL at 08:30

## 2022-09-23 RX ADMIN — SALINE NASAL SPRAY 2 SPRAY: 1.5 SOLUTION NASAL at 06:05

## 2022-09-23 RX ADMIN — METHYLPHENIDATE HYDROCHLORIDE 5 MG: 5 TABLET ORAL at 08:57

## 2022-09-23 RX ADMIN — SALINE NASAL SPRAY 2 SPRAY: 1.5 SOLUTION NASAL at 14:15

## 2022-09-23 RX ADMIN — TAMSULOSIN HYDROCHLORIDE 0.4 MG: 0.4 CAPSULE ORAL at 08:31

## 2022-09-23 RX ADMIN — HYDRALAZINE HYDROCHLORIDE 25 MG: 25 TABLET, FILM COATED ORAL at 21:33

## 2022-09-23 RX ADMIN — QUETIAPINE 25 MG: 25 TABLET ORAL at 12:17

## 2022-09-23 RX ADMIN — HYDRALAZINE HYDROCHLORIDE 25 MG: 25 TABLET, FILM COATED ORAL at 08:57

## 2022-09-23 ASSESSMENT — ACTIVITIES OF DAILY LIVING (ADL)
ADLS_ACUITY_SCORE: 33
ADLS_ACUITY_SCORE: 34
ADLS_ACUITY_SCORE: 33
ADLS_ACUITY_SCORE: 34
ADLS_ACUITY_SCORE: 33
ADLS_ACUITY_SCORE: 34
ADLS_ACUITY_SCORE: 33
ADLS_ACUITY_SCORE: 34
ADLS_ACUITY_SCORE: 34

## 2022-09-23 NOTE — PROGRESS NOTES
Calorie Count  Intake recorded for: 9/22  Total Kcals: 1818 Total Protein: 56g  Kcals from Hospital Food: 1818   Protein: 56g  Kcals from Outside Food (average):0 Protein: 0g  # Meals Ordered from Kitchen: 3 meals  # Meals Recorded: 3 meals (100% 2 garcia, wheat dinner roll, potato chips, 8oz Gatorade, grapes)      (100% beef pot roast w/ gravy, mashed potatoes w/ gravy, corn, wheat dinner roll, 2 chocolate chip cookies, 8oz Gatorade)      (100% turkey breast w/ gravy, corn, potato chips, 8oz Gatorade)  # Supplements Recorded: 0

## 2022-09-23 NOTE — CONSULTS
Gastroenterology Consultation  Dandy Sands [id number] 61 year old 1960  9/23/2022        Date of Admission: 9/18/2022  Requesting physician: Maria Elena Bateman MD       Reason for Consultation:   Diarrhea  History is obtained from the patient         Assessment and Plan:   Dandy Sands is a 61 year old male with a PMHx of SLE, antiphospholipid syndrome, pulmonary embolism (on warfarin), ICM (EF 10-15%) s/p HM3 LVAD (7/28/22), and C. Diff infection (dx on 9/7/2022, treated with 14 day PO vancomycin taper) who presented on 9/18/2022 for low flow alarms and reported ongoing profuse diarrhea.    # Loose stool  # Recent C diff infeciton on9/7 s/p 14 day of PO vancomycin (first episode)  # ICM (EF 10-15%) s/p HM3 LVAD (7/28/22)  # pulmonary embolism (on warfarin)  The patient's diarrhea at its worse was only 3 episodes a day and having only 1-2 BMs daily for the past two days. All episodes happen after meals. They seem to be more formed now. Normal bowel frequency is 3 BM/week to 3BMs/day. Post meals loose stool likely represent some degree of poor absorption after C diff infection vs medications related. Regardless, his current bowel patterns are not pathologic and not severe enough to explain the low flow alarm of the LVAD.           Recommendations:   - Continue with citrucell 1500mg daily. Can uptitrate as needed.   - Monitor stool frequency and output.   - Inpatient GI will sign off. If diarrhea worsen in the coming days (persistently more than 3BMs daily), please let us know.     It has been a pleasure to participate in the care of this patient.  Patient discussed with GI staff, Dr. Meneses.  Please do not hesitate to contact the GI service with any questions or concerns.     Demar Casper MD  Gastroenterology Fellow  [facility]  Text page [phone]           HPI:   Dandy Sands is a 61 year old male with a PMHx of SLE, antiphospholipid syndrome, pulmonary embolism (on warfarin), ICM (EF 10-15%)  s/p HM3 LVAD (7/28/22), and C. Diff infection (dx on 9/7/2022, treated with 14 day PO vancomycin taper) who presented on 9/18/2022 for low flow alarms and reported ongoing profuse diarrhea.    The patient confirmed with me that prior to his C. difficile infection he has a normal bowel pattern which consist of 1-2 formed BMs daily.  When he was diagnosed with C. difficile infection on 9/7 he was having about 3-4 episodes of watery diarrhea each day.  After diagnosis he was started on a 14-day course of p.o. vancomycin which finished on 9/21.  1 week after starting treatment he noticed some improvement of his diarrhea down to 1-2 soft BMs daily.  However later on in the course, his stool becomes watery again but the frequency was never more than 3 BMs a day.  After his admission on 9/18, he was seen by ID which did not think his symptom was due to ongoing C. difficile colitis.  He was started on Citrucel 1500 mg on 9/21.  Since then he has noticed some improvement of his loose stool.  9/21, he had only 2 BMs both occur after lunch and dinner respectively.  He has no episodes of diarrhea in between.  Yesterday he has only 1 BMs after dinner which was soft and not watery diarrhea.  He denied fever/chills/abdominal pain.  CT scan on 9/18 showed no evidence of colitis.         Past Medical History:   Reviewed and edited as appropriate  Past Medical History:   Diagnosis Date     Antiphospholipid antibody syndrome (H)      CAD (coronary artery disease)      Chronic systolic heart failure (H)      Ischemic cardiomyopathy      Mitral regurgitation      Systemic lupus erythematosus (H)             Past Surgical History:   Reviewed and edited as appropriate   Past Surgical History:   Procedure Laterality Date     COLONOSCOPY N/A 05/23/2022    Procedure: COLONOSCOPY;  Surgeon: Parth Meneses MD;  [facility]     CV CORONARY ANGIOGRAM N/A 05/24/2022    Procedure: Coronary Angiogram;  Surgeon: Mike Pope MD;   [facility]     CV CORONARY ANGIOGRAM N/A 05/29/2022    Procedure: Coronary Angiogram;  Surgeon: Austin Bright MD;  [facility]     CV CORONARY LITHOTRIPSY PCI Bilateral 05/24/2022    Procedure: Percutaneous Coronary Intervention - Lithotripsy;  Surgeon: Mike Pope MD;  [facility]     CV INTRA AORTIC BALLOON N/A 06/17/2022    Procedure: Intraprocedure Aortic Balloon Pump Insertion;  Surgeon: Sherman Cerda MD;  [facility]     CV INTRA AORTIC BALLOON Right 07/03/2022    Procedure: Reposition of Subclavian Intraprocedure Aortic Balloon Pump;  Surgeon: Austin Bright MD;  [facility]     CV INTRAVASULAR ULTRASOUND N/A 05/24/2022    Procedure: Intravascular Ultrasound;  Surgeon: Mike Pope MD;  [facility]     CV PCI ANGIOPLASTY N/A 05/29/2022    Procedure: Percutaneous Transluminal Angioplasty;  Surgeon: Austin Bright MD;  [facility]     CV PCI STENT DRUG ELUTING N/A 05/24/2022    Procedure: Percutaneous Coronary Intervention Stent;  Surgeon: Mike Pope MD;  [facility]     CV RIGHT HEART CATH MEASUREMENTS RECORDED N/A 05/18/2022    Procedure: Right Heart Catheterization;  Surgeon: Justo Stewart MD;  [facility]     CV RIGHT HEART CATH MEASUREMENTS RECORDED N/A 05/24/2022    Procedure: Right Heart Catheterization;  Surgeon: Mike Pope MD;  [facility]     CV RIGHT HEART CATH MEASUREMENTS RECORDED N/A 05/29/2022    Procedure: Right Heart Catheterization;  Surgeon: Austin Bright MD;  [facility]     CV RIGHT HEART CATH MEASUREMENTS RECORDED N/A 07/01/2022    Procedure: Right Heart Catheterization;  Surgeon: Mike Pope MD;  [facility]     CV RIGHT HEART EXERCISE STRESS  STUDY N/A 05/18/2022    Procedure: Stress Drug Study;  Surgeon: Justo Stewart MD;  Location:  [facility]     CV SWAN CHOCO PROCEDURE N/A 06/17/2022    Procedure: Norman Choco Procedure;  Surgeon: Sherman Cerda MD;  Location:  [facility]     INCISION AND DRAINAGE CHEST WASHOUT, COMBINED N/A 07/11/2022    Procedure: Chest washout and closure;  Surgeon: Darnell Morgan MD;  [facility]     INCISION AND DRAINAGE CHEST WASHOUT, COMBINED N/A 07/12/2022    Procedure: INCISION AND DRAINAGE, WOUND, CHEST, WITH IRRIGATION;  Surgeon: Darius Zamora MD;  Location: [facility]     INSERT ARTERIAL LINE  07/18/2022          INSERT INTRAAORTIC BALLOON PUMP Right 06/23/2022    Procedure: INSERTION, INTRA-AORTIC BALLOON PUMP RIGHT SUBCLAVIAN ARTERY.;  Surgeon: Hayden Veras MD;  Location: [facility]     INSERT VENTRICULAR ASSIST DEVICE LEFT (HEARTMATE II/III) MINIMALLY INVASIVE N/A 07/08/2022    Procedure: MEDIAN STERNOTOMY.  CARDIOPULMONARY BYPASS.  INTRAOPERATIVE TRANSESOPHAGEAL ECHOCARDIOGRAM.  IMPLANT LEFT VENTRICULAR ASSIST DEVICE HEARTMATE III.  PLACEMENT OF PERCUTANEOUS RIGHT VENTRICULAR ASSIST DEVICE.  TEMPORARY CHEST CLOSURE;  Surgeon: Darius Zamora MD;  Location: [facility]     IR CHEST TUBE PLACEMENT NON-TUNNELED RIGHT  08/01/2022     IRRIGATION AND DEBRIDEMENT CHEST WASHOUT, COMBINED N/A 07/13/2022    Procedure: IRRIGATION AND DEBRIDEMENT, CHEST;  Surgeon: Darius Zamora MD;  [facility]: [facility]     MIDLINE DOUBLE LUMEN PLACEMENT Left 09/11/2022    Mid line ok to use     MIDLINE DOUBLE LUMEN PLACEMENT Right 09/14/2022    5FR DL midline.     PICC DOUBLE LUMEN PLACEMENT Right 05/28/2022    right basilic 5 fr dl picc 40 cm     PICC DOUBLE LUMEN PLACEMENT Right 08/15/2022    Right Lateral, 41 total length, 3 cm external length            Medications:     Current Facility-Administered Medications   Medication     acetaminophen (TYLENOL) tablet 975 mg     atorvastatin (LIPITOR)  tablet 40 mg     Continuing ACE inhibitor/ARB/ARNI from home medication list OR ACE inhibitor/ARB/ARNI order already placed during this visit     digoxin (LANOXIN) tablet 125 mcg     HOLD nitroGLYcerin IF     hydrALAZINE (APRESOLINE) tablet 50 mg     hydrocortisone 1 % cream CREA     hydroxychloroquine (PLAQUENIL) tablet 200 mg     hydrOXYzine (ATARAX) tablet 25 mg     lisinopril (ZESTRIL) tablet 10 mg     melatonin tablet 3 mg     methylcellulose (CITRUCEL) tablet 1,500 mg     methylphenidate (RITALIN) tablet 5 mg     multivitamin w/minerals (THERA-VIT-M) tablet 1 tablet     [Held by provider] mycophenolate (GENERIC EQUIVALENT) tablet 1,500 mg     [Held by provider] pantoprazole (PROTONIX) EC tablet 40 mg     Patient is already receiving anticoagulation with heparin, enoxaparin (LOVENOX), warfarin (COUMADIN)  or other anticoagulant medication     pramox-pe-glycerin-petrolatum (PREPARATION H) cream     QUEtiapine (SEROquel) tablet 150 mg     QUEtiapine (SEROquel) tablet 25 mg     QUEtiapine (SEROquel) tablet 25 mg     Reason beta blocker not prescribed     [START ON 9/24/2022] sertraline (ZOLOFT) tablet 75 mg     sodium chloride (OCEAN) 0.65 % nasal spray 2 spray     tamsulosin (FLOMAX) capsule 0.4 mg     thiamine (B-1) tablet 100 mg     warfarin ANTICOAGULANT (COUMADIN) tablet 10 mg     Warfarin Dose Required Daily - Pharmacist Managed            Allergies    No Known Allergies         Social History:   Reviewed and edited as appropriate  Social History     Socioeconomic History     Marital status: Single     Spouse name: Not on file     Number of children: Not on file     Years of education: Not on file     Highest education level: Not on file   Occupational History     Not on file   Tobacco Use     Smoking status: Not on file     Smokeless tobacco: Not on file   Substance and Sexual Activity     Alcohol use: Not on file     Drug use: Not on file     Sexual activity: Not on file   Other Topics Concern     Not on  "file   Social History Narrative     Not on file     Social Determinants of Health     Financial Resource Strain: Not on file   Food Insecurity: Not on file   Transportation Needs: Not on file   Physical Activity: Not on file   Stress: Not on file   Social Connections: Not on file   Intimate Partner Violence: Not on file   Housing Stability: Not on file           Family History:   Reviewed and edited as appropriate  No family history on file. No known history of colorectal cancer, liver disease, or inflammatory bowel disease.         Review of Systems:   A complete 10 point review of systems was obtained.  Please see the HPI for pertinent positives and negatives.  All other systems were reviewed and were found to be negative.          Physical Exam:   VS:  BP 90/81   Pulse 82   Temp 97.5  F (36.4  C) (Oral)   Resp 18   Ht 1.702 m (5' 7\")   Wt 59.4 kg (131 lb)   SpO2 99%   BMI 20.52 kg/m      Wt:   Wt Readings from Last 2 Encounters:   09/23/22 59.4 kg (131 lb)   09/15/22 61.2 kg (134 lb 14.7 oz)      Constitutional: cooperative, pleasant, no acute distress  Eyes: Conjunctiva anicteric  HEENT: Normal oropharynx without ulcers or exudate, mucus membranes moist  CV: No Nitish edema  Respiratory: Breathing comfortably on ambient air  Abd:  Nondistended, +bs, no hepatosplenomegaly, nontender, no rebound or guarding   Skin: warm, perfused, no jaundice  Neuro: A&O x 3, No asterixis         Laboratory:   BMP  Recent Labs   Lab 09/23/22  0553 09/22/22  0532 09/21/22  0723 09/20/22  0545    134* 136 138   POTASSIUM 4.3 4.3 4.3 3.9   CHLORIDE 104 105 105 107   ELDA 9.2 9.0 9.2 9.4   CO2 21* 18* 20* 20*   BUN 19.2 20.1 15.0 12.1   CR 0.74 0.64* 0.65* 0.67   GLC 96 88 92 93     CBC  Recent Labs   Lab 09/23/22  1054 09/23/22  1017 09/23/22  0553 09/22/22  0532 09/21/22  0723 09/20/22  0545   WBC  --   --  5.4 5.2 5.2 5.1   RBC  --   --  3.42* 3.34* 3.40* 3.35*   HGB 9.2* 10.0* 10.1* 9.8* 9.9* 9.8*   HCT  --   --  31.7* " 31.5* 31.5* 31.1*   MCV  --   --  93 94 93 93   MCH  --   --  29.5 29.3 29.1 29.3   MCHC  --   --  31.9 31.1* 31.4* 31.5   RDW  --   --  17.0* 16.9* 17.0* 17.0*   PLT  --   --  218 203 204 215     INR  Recent Labs   Lab 09/23/22  0553 09/22/22  0532 09/21/22  0723 09/20/22  0545   INR 1.79* 1.91* 1.92* 1.79*     Liver Enzymes  Recent Labs   Lab 09/20/22  0545 09/18/22  1938   ALKPHOS 102 112   AST 20 39   ALT 11 14   BILITOTAL 0.4 0.4   PROTTOTAL 6.9 7.5   ALBUMIN 3.7 3.9      PANCNo lab results found in last 7 days.    Imaging/Procedures/Studies:

## 2022-09-23 NOTE — PROGRESS NOTES
LVAD Social Work Services Progress Note      Date of Initial Social Work Evaluation: 5/18/2022 with admission assessment completed on 9/19/2022  Collaborated with: Patient and Maricel    Data: Dandy remains on 6C being treated for diarrhea/dyhydration and evaluated for low-flow alarms. He had a right heart cath done this morning to evaluate the cause of low-flow alarms. The VAD coordinator will plan on exchanging his controller to help manage these alarms. He is getting closer to being ready for discharge. Writer sent referral to [facility] earlier this week, but patient does not meet requirements for inpatient rehab and safe to discharge back to the apartment.  Intervention: Met with Dandy for ongoing emotional support and informed him of not meeting skilled needs for rehab. Was able to apply for further mike assistance for more help with payment for the apartment and told Dandy he was approved. Called Maricel in order to give her an update about financial mike approval and not being able to discharge to TCU. Inquired into questions/concerns.  Assessment: Dandy and Asha verbalized understanding toward TCU as not an option. They expressed appreciation toward mike assistance. Asha plans on being here this weekend. Will be here between 12-2 tomorrow. Would benefit them if they could meet with the team in-person to discuss plan moving forward.  Education provided by FAVIOLA: Ongoing SW availability  Plan:    Discharge Plans in Progress: Home to local apartment ([place]) with Son and Dtr taking turns providing 24-hour care    Barriers to d/c plan: Medical condition    Follow up Plan: SW will continue to follow for ongoing psychosocial support

## 2022-09-23 NOTE — PLAN OF CARE
Neuro: A&O x 4.   Cardiac: 2 low flow alarms throughout day-- see prior notes. Happened approx 3 mins after activity, and while patient was sitting. LVAD controller changed to a controller with a low flow of 2. No alarms since. Pt worked with PT and ambulated in hallway. Hydralazine decreased.    Resp: RA  GI: No BM throughout shift. Medications adjusted (d/c'd protonix, zoloft decreased) to see if a contributing factor to diarrhea.   : Adequate UOP   Activity: Standby assist just for safety d/t dizziness w/ low flow alarms.

## 2022-09-23 NOTE — PLAN OF CARE
Goal Outcome Evaluation:    Plan of Care Reviewed With: patient     Overall Patient Progress: improving    Outcome Evaluation: Encourage intake of tolerated foods/beverages that do not worsen GI sx. Encourage intake of two Special K bars daily for additional protein. Also, encourage other high protein options.

## 2022-09-23 NOTE — PROGRESS NOTES
Cuff pressure 88/55 MAP 62. Concerned about giving 50mg hydralazine. MD at bedside obtaining consent for RHC--- concerns expressed. New order for 25mg hydralazine.

## 2022-09-23 NOTE — PLAN OF CARE
Admitted (9/18) for low flow alarms and ongoing profuse diarrhea. PMHx of SLE, antiphospholipid syndrome, pulmonary embolism (on warfarin), ICM (EF 10-15%) s/p HM3 LVAD (7/22), and C. Diff infection (on PO vancomycin taper)      Neuro: AOx4, Calm and cooperative. No deficits.  Cardiac/Tele: SR with a BBB, HR in the 90's, MAPs WNL, LVAD numbers WNL. No alarms this shift. Pt had a pre syncopal episode last evening with movement and transfer which resolved. Pt also endorses dizziness with standing. Other VSS  Respiratory: Room air tolerates well. Sats are > 90%.  GI/: Voids without difficulties, Adequate UOP. Pt has diarrhea. LBM (9/22). Methylcellulose added to pt regime to add bulk to stools.  Diet/Appetite: Regular diet. No FR. Encourage oral intake. Pt on Kcal counts  Skin: LVAD driveline site. No other deficits  Endocrine: No BG checks  LDAs: PIV salined locked   Activity: Up SBA  Pain: Denies     Plan: Echo today. Continue POC and notify team of concerns              Statement Selected

## 2022-09-23 NOTE — PROGRESS NOTES
Low flow alarm upon return from [facility] when transferring from stretcher back to bed. Alarmed approx 3-5 mins after walk from hallway to bed. MAP 49. Denies symptoms. Team notified.

## 2022-09-23 NOTE — PLAN OF CARE
Neuro: A&Ox4. Noted blurred and double vision after transferring from commode that resolved itself in 3-4 minutes. Team aware. Neuro assesment completed and neuros intact. Fluids encouraged.  Cardiac: SR with BBB. HM3 LVAD, parameters WNL.MAPs dopplered- 60-80s.    Respiratory: Sating >95% on RA. Denies SOB or dizziness.  GI/: Adequate urine output via bedside urinal. Loose BM X1.  Diet/appetite: Tolerating regular diet with calorie counts. Denies nausea.  Activity:  SBA.  Pain: Denies.   Skin: Old sternal incision and CT sites JOSE ENRIQUE.  LDA's: HM3 LVAD. R PIV saline locked.    Plan: Continue with POC. Notify primary team with changes.

## 2022-09-23 NOTE — PROGRESS NOTES
D: Stopped by to see patient. Pt continues to have occasional low flow alarms a few times a day.   I: Discussed POC and provided support and listened to patient and caregiver's thoughts and concerns. Reviewed option to pursue a different controller from [employer], that has a low flow alarm setting at 2 L/min (rather than the standard 2.5 L/min). Discussed option with Terry Camaerna and Félix and decided to move forward. Pt is agreeable to upgrade. [employer] Clinical Specialist on site to upgrade software on controllers. Pt tolerated controller change well with no symptoms. VAD parameters following controller change at baseline: Speed: 5100rpm's, Flow: 3.4l/min, PI: 3.4  P: Continue to follow patient and address any questions or concerns patient and or caregiver may have.

## 2022-09-23 NOTE — PROGRESS NOTES
Low flow alarm noted w/ standing this morning around 0915. Slight dizziness w/ movement. Appears orthostatic.

## 2022-09-24 LAB
ANION GAP SERPL CALCULATED.3IONS-SCNC: 12 MMOL/L (ref 7–15)
BASOPHILS # BLD AUTO: 0 10E3/UL (ref 0–0.2)
BASOPHILS NFR BLD AUTO: 1 %
BUN SERPL-MCNC: 18.8 MG/DL (ref 8–23)
CALCIUM SERPL-MCNC: 9.2 MG/DL (ref 8.8–10.2)
CHLORIDE SERPL-SCNC: 107 MMOL/L (ref 98–107)
CREAT SERPL-MCNC: 0.67 MG/DL (ref 0.67–1.17)
DEPRECATED HCO3 PLAS-SCNC: 20 MMOL/L (ref 22–29)
EOSINOPHIL # BLD AUTO: 0.1 10E3/UL (ref 0–0.7)
EOSINOPHIL NFR BLD AUTO: 2 %
ERYTHROCYTE [DISTWIDTH] IN BLOOD BY AUTOMATED COUNT: 16.8 % (ref 10–15)
GFR SERPL CREATININE-BSD FRML MDRD: >90 ML/MIN/1.73M2
GLUCOSE SERPL-MCNC: 99 MG/DL (ref 70–99)
HCT VFR BLD AUTO: 31.1 % (ref 40–53)
HGB BLD-MCNC: 9.7 G/DL (ref 13.3–17.7)
IMM GRANULOCYTES # BLD: 0 10E3/UL
IMM GRANULOCYTES NFR BLD: 1 %
INR PPP: 2.2 (ref 0.85–1.15)
LYMPHOCYTES # BLD AUTO: 1.1 10E3/UL (ref 0.8–5.3)
LYMPHOCYTES NFR BLD AUTO: 20 %
MAGNESIUM SERPL-MCNC: 1.6 MG/DL (ref 1.7–2.3)
MCH RBC QN AUTO: 29.3 PG (ref 26.5–33)
MCHC RBC AUTO-ENTMCNC: 31.2 G/DL (ref 31.5–36.5)
MCV RBC AUTO: 94 FL (ref 78–100)
MONOCYTES # BLD AUTO: 0.5 10E3/UL (ref 0–1.3)
MONOCYTES NFR BLD AUTO: 9 %
NEUTROPHILS # BLD AUTO: 3.8 10E3/UL (ref 1.6–8.3)
NEUTROPHILS NFR BLD AUTO: 67 %
NRBC # BLD AUTO: 0 10E3/UL
NRBC BLD AUTO-RTO: 0 /100
PLATELET # BLD AUTO: 213 10E3/UL (ref 150–450)
POTASSIUM SERPL-SCNC: 4.4 MMOL/L (ref 3.4–5.3)
RBC # BLD AUTO: 3.31 10E6/UL (ref 4.4–5.9)
SODIUM SERPL-SCNC: 139 MMOL/L (ref 136–145)
WBC # BLD AUTO: 5.7 10E3/UL (ref 4–11)

## 2022-09-24 PROCEDURE — 85610 PROTHROMBIN TIME: CPT

## 2022-09-24 PROCEDURE — 85025 COMPLETE CBC W/AUTO DIFF WBC: CPT

## 2022-09-24 PROCEDURE — 250N000013 HC RX MED GY IP 250 OP 250 PS 637

## 2022-09-24 PROCEDURE — 250N000011 HC RX IP 250 OP 636

## 2022-09-24 PROCEDURE — 250N000013 HC RX MED GY IP 250 OP 250 PS 637: Performed by: INTERNAL MEDICINE

## 2022-09-24 PROCEDURE — 36415 COLL VENOUS BLD VENIPUNCTURE: CPT

## 2022-09-24 PROCEDURE — 99233 SBSQ HOSP IP/OBS HIGH 50: CPT | Mod: GC | Performed by: INTERNAL MEDICINE

## 2022-09-24 PROCEDURE — 82310 ASSAY OF CALCIUM: CPT

## 2022-09-24 PROCEDURE — 250N000012 HC RX MED GY IP 250 OP 636 PS 637

## 2022-09-24 PROCEDURE — 83735 ASSAY OF MAGNESIUM: CPT

## 2022-09-24 PROCEDURE — 214N000001 HC R&B CCU UMMC

## 2022-09-24 RX ORDER — WARFARIN SODIUM 10 MG/1
10 TABLET ORAL
Status: DISCONTINUED | OUTPATIENT
Start: 2022-09-24 | End: 2022-09-24

## 2022-09-24 RX ORDER — MYCOPHENOLATE MOFETIL 500 MG/1
1500 TABLET ORAL
Status: DISCONTINUED | OUTPATIENT
Start: 2022-09-24 | End: 2022-09-25

## 2022-09-24 RX ORDER — MAGNESIUM OXIDE 400 MG/1
400 TABLET ORAL EVERY 4 HOURS
Status: COMPLETED | OUTPATIENT
Start: 2022-09-24 | End: 2022-09-24

## 2022-09-24 RX ORDER — WARFARIN SODIUM 7.5 MG/1
7.5 TABLET ORAL
Status: COMPLETED | OUTPATIENT
Start: 2022-09-24 | End: 2022-09-24

## 2022-09-24 RX ORDER — MAGNESIUM SULFATE HEPTAHYDRATE 40 MG/ML
4 INJECTION, SOLUTION INTRAVENOUS ONCE
Status: DISCONTINUED | OUTPATIENT
Start: 2022-09-24 | End: 2022-09-24

## 2022-09-24 RX ADMIN — ATORVASTATIN CALCIUM 40 MG: 40 TABLET, FILM COATED ORAL at 21:06

## 2022-09-24 RX ADMIN — ACETAMINOPHEN 975 MG: 325 TABLET, FILM COATED ORAL at 21:05

## 2022-09-24 RX ADMIN — TAMSULOSIN HYDROCHLORIDE 0.4 MG: 0.4 CAPSULE ORAL at 08:35

## 2022-09-24 RX ADMIN — METHYLPHENIDATE HYDROCHLORIDE 5 MG: 5 TABLET ORAL at 08:35

## 2022-09-24 RX ADMIN — THIAMINE HCL TAB 100 MG 100 MG: 100 TAB at 08:35

## 2022-09-24 RX ADMIN — HYDROXYCHLOROQUINE SULFATE 200 MG: 200 TABLET, FILM COATED ORAL at 21:05

## 2022-09-24 RX ADMIN — HYDRALAZINE HYDROCHLORIDE 25 MG: 25 TABLET, FILM COATED ORAL at 14:01

## 2022-09-24 RX ADMIN — MYCOPHENOLATE MOFETIL 1500 MG: 500 TABLET, FILM COATED ORAL at 17:58

## 2022-09-24 RX ADMIN — Medication 1 TABLET: at 08:35

## 2022-09-24 RX ADMIN — MAGNESIUM OXIDE TAB 400 MG (241.3 MG ELEMENTAL MG) 400 MG: 400 (241.3 MG) TAB at 14:01

## 2022-09-24 RX ADMIN — METHYLCELLULOSE 1500 MG: 500 TABLET ORAL at 08:35

## 2022-09-24 RX ADMIN — HYDROXYZINE HYDROCHLORIDE 25 MG: 25 TABLET, FILM COATED ORAL at 21:05

## 2022-09-24 RX ADMIN — DIGOXIN 125 MCG: 125 TABLET ORAL at 08:35

## 2022-09-24 RX ADMIN — HYDRALAZINE HYDROCHLORIDE 25 MG: 25 TABLET, FILM COATED ORAL at 21:05

## 2022-09-24 RX ADMIN — MAGNESIUM OXIDE TAB 400 MG (241.3 MG ELEMENTAL MG) 400 MG: 400 (241.3 MG) TAB at 10:15

## 2022-09-24 RX ADMIN — SALINE NASAL SPRAY 2 SPRAY: 1.5 SOLUTION NASAL at 17:59

## 2022-09-24 RX ADMIN — QUETIAPINE 25 MG: 25 TABLET ORAL at 08:34

## 2022-09-24 RX ADMIN — QUETIAPINE 25 MG: 25 TABLET ORAL at 10:15

## 2022-09-24 RX ADMIN — HYDROXYCHLOROQUINE SULFATE 200 MG: 200 TABLET, FILM COATED ORAL at 08:35

## 2022-09-24 RX ADMIN — SERTRALINE 75 MG: 25 TABLET, FILM COATED ORAL at 08:34

## 2022-09-24 RX ADMIN — SALINE NASAL SPRAY 2 SPRAY: 1.5 SOLUTION NASAL at 21:06

## 2022-09-24 RX ADMIN — Medication 150 MG: at 21:04

## 2022-09-24 RX ADMIN — METHYLPHENIDATE HYDROCHLORIDE 5 MG: 5 TABLET ORAL at 14:01

## 2022-09-24 RX ADMIN — MYCOPHENOLATE MOFETIL 1500 MG: 500 TABLET, FILM COATED ORAL at 14:00

## 2022-09-24 RX ADMIN — HYDRALAZINE HYDROCHLORIDE 25 MG: 25 TABLET, FILM COATED ORAL at 08:35

## 2022-09-24 RX ADMIN — WARFARIN SODIUM 7.5 MG: 7.5 TABLET ORAL at 17:58

## 2022-09-24 RX ADMIN — LISINOPRIL 10 MG: 10 TABLET ORAL at 08:35

## 2022-09-24 ASSESSMENT — ACTIVITIES OF DAILY LIVING (ADL)
ADLS_ACUITY_SCORE: 33
ADLS_ACUITY_SCORE: 32
ADLS_ACUITY_SCORE: 32
ADLS_ACUITY_SCORE: 33

## 2022-09-24 NOTE — PROGRESS NOTES
[facility]    Cardiology Progress Note- Cardiology 2    I personally saw and examined this patient with the resident, discussed his LVAD low flow problem with LVAD nurse and [employer] support staff.  I agree with plan to substitute monitor equipment for low flow system.  There is no evidence of right heart failure or graft outflow stenosis to suggest cause for low flow alarms .  Volume status continues to be adjusted.  Patient will likely be able to be considered for discharge in the next 72 hours.  45 minutes        Date of Admission:  9/18/2022     Assessment & Plan: HVSL   Dandy Sands is a 61 year old male with a PMHx of SLE, antiphospholipid syndrome, pulmonary embolism (on warfarin), ICM (EF 10-15%) s/p HM3 LVAD (7/28/22), and C. Diff infection (on PO vancomycin taper) who is currently admitted 9/18/2022 for low flow alarms and ongoing profuse diarrhea. Completed course of po vancomycin, encouraging fluids, and trialing stool bulking agents with improvement in diarrhea and lightheadedness, but still having low flow alarms.     Changes today 9/23:   - Got his RHC done today  - Continues to have low flow alarms while trying to go to cath lab and on coming back  - Gi was consulted and they rec trialing methylcellulose for stool bulking (currently at 1,500mg daily) and can increase as tolerated  - Changed dosage of Zoloft   - discontinue pantoprazole     #LVAD low flow alarms  #Diarrhea, improving  #C. Diff infection (positive 9/7/2022), treated  Prior to admit patient was on a 14-day course of 125 mg PO vancomycin qid which was set to go through 9/21/22. Patient denies any missed doses. He states he initially improved with PO vancomycin, but over the past day prior to admission his symptoms have been worsening (3 episodes of non-bloody diarrhea per day, low energy, lack of appetite). CT A/P in the ED negative for toxic megacolon. ID thinks diarrhea  is less likely to be from recurrent C. Diff. Trialing stool bulking agents. Overall, diarrhea and lightheadedness have been improving. Still having low flow alarms. Consulted the GI regarding any cause for chronic diarrhea that might be causing low volume status and might be causing the LVAD low flow alarms. GI have rec that it is highly unlikely and rec continuing his methycellulose and uptitirated as needed.    - ID consult, appreciate recs   - Regular diet without fluid restriction  - Calorie counts  - encouraged fluids  - completed 14-day course of po vancomycin 125 mg QID (last day  9/21/2022)  - curb-sided Rheum- holding mycophenolate in the setting of infection, can reach out to Rheum closer to discharge to discuss how long to hold mycophenolate  - curb-sided GI- trialing methylcellulose for stool bulking (currently at 1,500mg daily, can increase as tolerated)  - Got TTE sitting and standing to assess LVAD    #Stage D HFrEF 2/2 ICM (EF 10-15%) s/p HM3 LVAD (7/28/22) and CRT-D ('06)  #LVAD low flow events  #CAD s/p PCI to LAD ('05)  - LVAD: LVAD speed 5100 (reduced from 5200 9/19/2022)  - Volume status: Mildly hypovolemic to euvolemic (weight on 9/15 discharge 134 lbs, weight this admit 132 lbs)   - GDMT   > BB: Discontinued during previous admission with cardiogenic shock   > ACEi/ARB/ARNi: PTA lisinopril 10 mg daily   > MRA: None    > SGLT2i: None   - SCD ppx: s/p CRT-D (2006)  - AC: Pharmacy to dose warfarin    > on 5 mg daily at home (INR goal 2-3)  - PTA digoxin 125 mcg daily  - PTA atorvastatin 40 mg daily  - Telemetry monitoring  Got his RHC 9/23  RA 10/11/8 mmHg  RV 19/8 mmHg  PA 18/10/13 mmHg  CWP 5/4/4 mmHg  CO (Marley) 3.7 L/min  CI (Marley) 2.2 L/min/m2  CO (TD) 3.27 L/min  CI (TD) 1.94 L/min/m2    #Depression  #MIGUEL  Has had low mood with multiple hospitalizations post LVAD.   - Psychiatry consult, appreciate recs  - Psychology consult, appreciate recs  - PTA seroquel 25 mg QAM, 25 mg at noon, 150 mg  QPM  - decreased sertraline to 75mg daily (adjusted this in view of risk for chronic diarrhea)  - continue Ritalin 5mg in the morning and early afternoon per Psychiatry recs- started this admission (started 9/20, can increase to 10mg per dose in a few days if tolerated)  - PTA hydroxyzine prn    #GERD  #Dysphagia  - d/c pantoprazole in view of risk for chronic diarrhea    #SLE  #APS  #Hx of DVT/PE  On warfarin PTA (INR goal 2-3). Decreased to 5 mg daily during last admission due to mucus membrane bleeding.  - Pharmacy to dose warfarin  - PTA hydroxychloroquine 200mg daily  - HOLDING PTA mycophenolate 1500mg bid in the setting of infection (discussed this with Rheum as above)    #Severe malnutrition in the context of acute on chronic illness  - Nutrition consult  - PT/OT consult  - PTA thiamine 100 mg    #BPH  - PTA tamsulosin 0.4mg daily    #Mild-moderate emphysema  - Not currently using any PTA inhalers      Diet:  Regular diet  DVT Prophylaxis: Warfarin  Fitzgerald Catheter: Not present  Code Status:  Full code       Disposition Plan   Expected discharge: 1-2 days, recommended to prior living arrangement once diarrhea improvement, reduction in low flow alarms.       The patient's care was discussed with Dr Colt Merlos  Internal Medicine Resident PGY1  [facility]    ______________________________________________________________________    Interval History   NAEON. Nursing notes reviewed. Had 3 loose but not watery bowel movements today. Was on his way to his RHC when I saw him in the morning. Denies any chest pain or shortness of breath. There is no abdominal discomfort or concerns with urination. Patient denies any LE edema, orthopnea/PND, dizziness, lightheadedness, nausea, vomiting     His flow alarm went off when he was getting off the bed to go for his RHC and when he was back. He reports being asymptomatic when the  alarms go off, but they occur when he sits up.     Data reviewed today: I reviewed all medications, new labs and imaging results over the last 24 hours.     Physical Exam   Vital Signs: Temp: 98.7  F (37.1  C) Temp src: Oral BP: 92/79 Pulse: 91   Resp: 16 SpO2: 98 % O2 Device: None (Room air)    Weight: 131 lbs 0 oz  General: lying in bed, NAD  HEENT: no scleral icterus or conjunctival injection, oropharynx clear  Resp: clear to auscultation bilaterally, no crackles or wheezes  Cardiac: LVAD hum. JVD at base of neck lying at 20 degrees.  Abdomen: soft, non-tender, non-distended. No rebound or guarding. Driveline site with gauze without swelling or tenderness.   Extremities: warm, no lower extremity edema  MSK: cachetic   Neuro: alert, answers questions appropriately  Psych: appropriate mood and affect

## 2022-09-24 NOTE — PROGRESS NOTES
White male with diarrhea complicating recent insertion of LVAD.  Our plan is to reintroduce mycophenolate and observe for adverse reactions.  I saw this patient with the resident.        [facility]    Cardiology Progress Note- Cardiology 2        Date of Admission:  9/18/2022     Assessment & Plan: HVSL   Dandy Sands is a 61 year old male with a PMHx of SLE, antiphospholipid syndrome, pulmonary embolism (on warfarin), ICM (EF 10-15%) s/p HM3 LVAD (7/28/22), and C. Diff infection (on PO vancomycin taper) who is currently admitted 9/18/2022 for low flow alarms and ongoing profuse diarrhea. Completed course of po vancomycin, encouraging fluids, and trialing stool bulking agents with improvement in diarrhea and lightheadedness. No low flow alarms since alarm threshold adjusted to 2.     Changes today 9/24:   - will see if he has any low flow alarms during the day  - started magnesium replacement protocol  - resume PTA mycophenolate 1500mg bid (discussed this with Rheum)  - likely will discharge 9/25 or 9/26    #LVAD low flow alarms  #Diarrhea, improved  #C. Diff infection (positive 9/7/2022), treated  Prior to admit patient was on a 14-day course of 125 mg PO vancomycin qid which was set to go through 9/21/22. Patient denies any missed doses. He states he initially improved with PO vancomycin, but over the past day prior to admission his symptoms have been worsening (3 episodes of non-bloody diarrhea per day, low energy, lack of appetite). CT A/P in the ED negative for toxic megacolon. ID thinks diarrhea is less likely to be from recurrent C. Diff. Trialing stool bulking agents with improvement. CTA in August 2022 with normal outflow graft. Waveforms reviewed by LVAD engineers and it is not a device issue. Overall, diarrhea and lightheadedness have been improving, and he has had no low flow alarms since threshold for alarm was lowered to 2.  - ID consult- unlikely  to have C. Diff recurrence   - GI consult- methylcellulose 1,500mg daily, can increase as tolerated  - Regular diet without fluid restriction  - Calorie counts  - encouraged fluids  - completed 14-day course of po vancomycin 125 mg QID (last day  9/21/2022)    #Stage D HFrEF 2/2 ICM (EF 10-15%) s/p HM3 LVAD (7/28/22) and CRT-D ('06)  #LVAD low flow events  #CAD s/p PCI to LAD ('05)  - LVAD: LVAD speed 5100 (reduced from 5200 9/19/2022)  - Volume status: Mildly hypovolemic to euvolemic (weight on 9/15 discharge 134 lbs, weight this admit 132 lbs)   - GDMT   > BB: Discontinued during previous admission with cardiogenic shock   > ACEi/ARB/ARNi: PTA lisinopril 10 mg daily   > MRA: None    > SGLT2i: None   - SCD ppx: s/p CRT-D (2006)  - AC: Pharmacy to dose warfarin    > on 5 mg daily at home (INR goal 2-3)  - PTA digoxin 125 mcg daily  - PTA atorvastatin 40 mg daily  - [facility]  RA 10/11/8 mmHg  RV 19/8 mmHg  PA 18/10/13 mmHg  CWP 5/4/4 mmHg  CO (Marley) 3.7 L/min  CI (Marley) 2.2 L/min/m2  CO (TD) 3.27 L/min  CI (TD) 1.94 L/min/m2    #HTN  - continue hydralazine 25mg TID (pta was on 50mg TID, dose decreased 9/23 because of low flows)  - PTA lisinopril 10 mg daily    #Depression  #MIGUEL  Has had low mood with multiple hospitalizations post LVAD.   - Psychiatry consult, appreciate recs  - Psychology consult, appreciate recs  - PTA seroquel 25 mg QAM, 25 mg at noon, 150 mg QPM  - continue sertraline 75mg daily (pta was on 100mg daily, decreased on 9/23 due to risk of diarrhea)  - continue Ritalin 5mg in the morning and early afternoon per Psychiatry recs- started this admission (started 9/20, can increase to 10mg per dose in a few days if tolerated)  - PTA hydroxyzine prn    #GERD  #Dysphagia  - stopped pta pantoprazole 40mg daily on 9/23 because of risk of diarrhea    #SLE  #APS  #Hx of DVT/PE  On warfarin PTA (INR goal 2-3). Decreased to 5 mg daily during last admission due to mucus membrane  bleeding.  - Pharmacy to dose warfarin  - PTA hydroxychloroquine 200mg daily  - Resume PTA mycophenolate 1500mg bid (discussed this with Rheum)    #Severe malnutrition in the context of acute on chronic illness  - Nutrition consult  - PT/OT consult  - PTA thiamine 100 mg    #BPH  - PTA tamsulosin 0.4mg daily    #Mild-moderate emphysema  - Not currently using any PTA inhalers      Diet:  Regular diet  DVT Prophylaxis: Warfarin  Fitzgerald Catheter: Not present  Code Status:  Full code       Disposition Plan   Expected discharge:  1-2 days , recommended to prior living arrangement once  diarrhea improvement, reduction in low flow alarms .       The patient's care was discussed with Dr Colt Can MD  Internal Medicine Resident PGY2  [facility]    ______________________________________________________________________    Interval History   NAEON. Nursing notes reviewed. He reports one episode of loose but formed stool yesterday evening 9/23 but has otherwise been doing better from a diarrhea stand point. Still gets a little lightheaded with getting up but better. No chest pain, shortness of breath, abdominal pain, nausea, vomiting. No low flow alarms on LVAD overnight.     Data reviewed today: I reviewed all medications, new labs and imaging results over the last 24 hours.     Physical Exam   Vital Signs: Temp: 97.9  F (36.6  C) Temp src: Oral BP: 91/69 Pulse: 96   Resp: 18 SpO2: 97 % O2 Device: None (Room air)    Weight: 132 lbs 0 oz  General: lying in bed, NAD  HEENT: no scleral icterus or conjunctival injection, oropharynx clear  Resp: clear to auscultation bilaterally, no crackles or wheezes  Cardiac: LVAD hum. JVD at base of neck lying at 20 degrees.  Abdomen: soft, non-tender, non-distended. No rebound or guarding. Driveline site with gauze without swelling or tenderness.   Extremities: warm, no lower extremity edema  MSK:  cachetic   Neuro: alert, answers questions appropriately   Psych: appropriate mood and affect

## 2022-09-24 NOTE — PLAN OF CARE
Hours of Care:  1900 - 0700    Temp:  [97.5  F (36.4  C)-98.7  F (37.1  C)] 98  F (36.7  C)  Pulse:  [78-98] 98  Resp:  [16-18] 18  BP: (83-92)/(30-81) 90/62  SpO2:  [97 %-99 %] 97 %     D: Dandy Sands is a 61 year old male with a PMHx of SLE, antiphospholipid syndrome, pulmonary embolism (on warfarin), ICM (EF 10-15%) s/p HM3 LVAD (7/28/22), and C. Diff infection (dx on 9/7/2022, treated with 14 day PO vancomycin taper) who presented on 9/18/2022 for low flow alarms and reported ongoing profuse diarrhea.     I: Monitored vitals and assessed pt status.     PRN: Atarax  Tele: Sinus rhythm with BBB  O2: Room air  Mobility: SBA     A: Neuro: A/O x4.  Call light appropriate.  Able to make needs known.  Respiratory:  On room air.  Lung sounds clear.  Denies shortness of breath at rest.  Cardiac: VSS.  Heart Mate 3.  All numbers within ordered parameters.  No alarms overnight.  Received 10 mg Coumadin.  INR 1.79 (9/23)  No s/s of bleeding.  GI: Last BM 9/22.  No report of nausea or vomiting.  : Urinating adequate amounts of clear, yellow urine  Skin:  See PCS for assessment and treatment of wounds and surgical incisions.  LDA:  22 G PIV RFA    Electrolytes: No RN managed electrolyte protocols ordered  Pain: Denies  Isolation:  Standard Precautions  Tests/procedures: None performed overnight.  Diet: Regular diet     P: Continue to monitor Pt status and report changes to Cards 2.

## 2022-09-24 NOTE — PLAN OF CARE
AO X 4, VSS, VAD numbers WDL, no VAD alarms, VAD dressing changed per order, no IV access (with approval of C2), patient complaining of less dizziness when walking and less diarrhea, BM x 1, encouraging pt to stay hydrated. Patient to discharge home when euvolemic.

## 2022-09-24 NOTE — PLAN OF CARE
[id number]:    Pt is A/O x 4.   SBA with GB and walker.   VSS, RA. denies pain.   Tele SR with BBB.   Lung sounds clear throughout. Denies SOB.   LVAD WDL. Drive line dressing CDI. No alarms this shift.  BS active x4, still having diarrhea per pt report. Adequate urine output via urinal.  Regular diet, tolerating well.   Plans for potential discharge on Monday, 9/26.   Patient currently resting in bed with call light in reach.

## 2022-09-25 LAB
ANION GAP SERPL CALCULATED.3IONS-SCNC: 10 MMOL/L (ref 7–15)
BASOPHILS # BLD AUTO: 0 10E3/UL (ref 0–0.2)
BASOPHILS NFR BLD AUTO: 1 %
BUN SERPL-MCNC: 14.7 MG/DL (ref 8–23)
CALCIUM SERPL-MCNC: 9.3 MG/DL (ref 8.8–10.2)
CHLORIDE SERPL-SCNC: 106 MMOL/L (ref 98–107)
CREAT SERPL-MCNC: 0.61 MG/DL (ref 0.67–1.17)
DEPRECATED HCO3 PLAS-SCNC: 20 MMOL/L (ref 22–29)
EOSINOPHIL # BLD AUTO: 0.1 10E3/UL (ref 0–0.7)
EOSINOPHIL NFR BLD AUTO: 2 %
ERYTHROCYTE [DISTWIDTH] IN BLOOD BY AUTOMATED COUNT: 16.6 % (ref 10–15)
GFR SERPL CREATININE-BSD FRML MDRD: >90 ML/MIN/1.73M2
GLUCOSE SERPL-MCNC: 90 MG/DL (ref 70–99)
HCT VFR BLD AUTO: 31.7 % (ref 40–53)
HGB BLD-MCNC: 9.9 G/DL (ref 13.3–17.7)
IMM GRANULOCYTES # BLD: 0 10E3/UL
IMM GRANULOCYTES NFR BLD: 0 %
INR PPP: 2.28 (ref 0.85–1.15)
LYMPHOCYTES # BLD AUTO: 1 10E3/UL (ref 0.8–5.3)
LYMPHOCYTES NFR BLD AUTO: 20 %
MAGNESIUM SERPL-MCNC: 1.6 MG/DL (ref 1.7–2.3)
MCH RBC QN AUTO: 29.3 PG (ref 26.5–33)
MCHC RBC AUTO-ENTMCNC: 31.2 G/DL (ref 31.5–36.5)
MCV RBC AUTO: 94 FL (ref 78–100)
MONOCYTES # BLD AUTO: 0.5 10E3/UL (ref 0–1.3)
MONOCYTES NFR BLD AUTO: 11 %
NEUTROPHILS # BLD AUTO: 3.2 10E3/UL (ref 1.6–8.3)
NEUTROPHILS NFR BLD AUTO: 66 %
NRBC # BLD AUTO: 0 10E3/UL
NRBC BLD AUTO-RTO: 0 /100
PLATELET # BLD AUTO: 203 10E3/UL (ref 150–450)
POTASSIUM SERPL-SCNC: 4.1 MMOL/L (ref 3.4–5.3)
RBC # BLD AUTO: 3.38 10E6/UL (ref 4.4–5.9)
SODIUM SERPL-SCNC: 136 MMOL/L (ref 136–145)
WBC # BLD AUTO: 4.8 10E3/UL (ref 4–11)

## 2022-09-25 PROCEDURE — 36415 COLL VENOUS BLD VENIPUNCTURE: CPT

## 2022-09-25 PROCEDURE — 250N000013 HC RX MED GY IP 250 OP 250 PS 637: Performed by: INTERNAL MEDICINE

## 2022-09-25 PROCEDURE — 99233 SBSQ HOSP IP/OBS HIGH 50: CPT | Mod: GC | Performed by: INTERNAL MEDICINE

## 2022-09-25 PROCEDURE — 214N000001 HC R&B CCU UMMC

## 2022-09-25 PROCEDURE — 99254 IP/OBS CNSLTJ NEW/EST MOD 60: CPT | Mod: GC | Performed by: INTERNAL MEDICINE

## 2022-09-25 PROCEDURE — 85610 PROTHROMBIN TIME: CPT

## 2022-09-25 PROCEDURE — 250N000012 HC RX MED GY IP 250 OP 636 PS 637

## 2022-09-25 PROCEDURE — 250N000013 HC RX MED GY IP 250 OP 250 PS 637

## 2022-09-25 PROCEDURE — 80048 BASIC METABOLIC PNL TOTAL CA: CPT

## 2022-09-25 PROCEDURE — 85025 COMPLETE CBC W/AUTO DIFF WBC: CPT

## 2022-09-25 PROCEDURE — 83735 ASSAY OF MAGNESIUM: CPT

## 2022-09-25 RX ORDER — WARFARIN SODIUM 7.5 MG/1
7.5 TABLET ORAL
Status: COMPLETED | OUTPATIENT
Start: 2022-09-25 | End: 2022-09-25

## 2022-09-25 RX ORDER — MYCOPHENOLIC ACID 360 MG/1
360 TABLET, DELAYED RELEASE ORAL
Status: DISCONTINUED | OUTPATIENT
Start: 2022-10-03 | End: 2022-09-29 | Stop reason: HOSPADM

## 2022-09-25 RX ORDER — MAGNESIUM OXIDE 400 MG/1
400 TABLET ORAL EVERY 4 HOURS
Status: COMPLETED | OUTPATIENT
Start: 2022-09-25 | End: 2022-09-25

## 2022-09-25 RX ORDER — MYCOPHENOLIC ACID 360 MG/1
720 TABLET, DELAYED RELEASE ORAL DAILY
Status: DISCONTINUED | OUTPATIENT
Start: 2022-10-10 | End: 2022-09-29 | Stop reason: HOSPADM

## 2022-09-25 RX ORDER — MYCOPHENOLIC ACID 180 MG/1
180 TABLET, DELAYED RELEASE ORAL
Status: DISCONTINUED | OUTPATIENT
Start: 2022-09-26 | End: 2022-09-29 | Stop reason: HOSPADM

## 2022-09-25 RX ORDER — MYCOPHENOLIC ACID 180 MG/1
180 TABLET, DELAYED RELEASE ORAL ONCE
Status: COMPLETED | OUTPATIENT
Start: 2022-09-25 | End: 2022-09-25

## 2022-09-25 RX ADMIN — LISINOPRIL 10 MG: 10 TABLET ORAL at 08:48

## 2022-09-25 RX ADMIN — SALINE NASAL SPRAY 2 SPRAY: 1.5 SOLUTION NASAL at 12:59

## 2022-09-25 RX ADMIN — ACETAMINOPHEN 975 MG: 325 TABLET, FILM COATED ORAL at 21:37

## 2022-09-25 RX ADMIN — METHYLPHENIDATE HYDROCHLORIDE 5 MG: 5 TABLET ORAL at 08:48

## 2022-09-25 RX ADMIN — HYDROXYCHLOROQUINE SULFATE 200 MG: 200 TABLET, FILM COATED ORAL at 08:48

## 2022-09-25 RX ADMIN — ATORVASTATIN CALCIUM 40 MG: 40 TABLET, FILM COATED ORAL at 21:37

## 2022-09-25 RX ADMIN — Medication 1 TABLET: at 12:58

## 2022-09-25 RX ADMIN — WARFARIN SODIUM 7.5 MG: 7.5 TABLET ORAL at 17:35

## 2022-09-25 RX ADMIN — SALINE NASAL SPRAY 2 SPRAY: 1.5 SOLUTION NASAL at 21:44

## 2022-09-25 RX ADMIN — HYDROXYCHLOROQUINE SULFATE 200 MG: 200 TABLET, FILM COATED ORAL at 21:38

## 2022-09-25 RX ADMIN — MYCOPHENOLIC ACID 180 MG: 180 TABLET, DELAYED RELEASE ORAL at 17:34

## 2022-09-25 RX ADMIN — HYDRALAZINE HYDROCHLORIDE 25 MG: 25 TABLET, FILM COATED ORAL at 21:38

## 2022-09-25 RX ADMIN — QUETIAPINE 25 MG: 25 TABLET ORAL at 08:48

## 2022-09-25 RX ADMIN — HYDRALAZINE HYDROCHLORIDE 25 MG: 25 TABLET, FILM COATED ORAL at 14:09

## 2022-09-25 RX ADMIN — SALINE NASAL SPRAY 2 SPRAY: 1.5 SOLUTION NASAL at 17:36

## 2022-09-25 RX ADMIN — MAGNESIUM OXIDE TAB 400 MG (241.3 MG ELEMENTAL MG) 400 MG: 400 (241.3 MG) TAB at 08:49

## 2022-09-25 RX ADMIN — THIAMINE HCL TAB 100 MG 100 MG: 100 TAB at 08:49

## 2022-09-25 RX ADMIN — METHYLCELLULOSE 1000 MG: 500 TABLET ORAL at 08:48

## 2022-09-25 RX ADMIN — Medication 150 MG: at 21:37

## 2022-09-25 RX ADMIN — MAGNESIUM OXIDE TAB 400 MG (241.3 MG ELEMENTAL MG) 400 MG: 400 (241.3 MG) TAB at 12:58

## 2022-09-25 RX ADMIN — MYCOPHENOLATE MOFETIL 1500 MG: 500 TABLET, FILM COATED ORAL at 08:48

## 2022-09-25 RX ADMIN — METHYLPHENIDATE HYDROCHLORIDE 5 MG: 5 TABLET ORAL at 12:58

## 2022-09-25 RX ADMIN — SERTRALINE 75 MG: 25 TABLET, FILM COATED ORAL at 08:49

## 2022-09-25 RX ADMIN — Medication 3 MG: at 21:37

## 2022-09-25 RX ADMIN — METHYLCELLULOSE 1000 MG: 500 TABLET ORAL at 21:38

## 2022-09-25 RX ADMIN — HYDRALAZINE HYDROCHLORIDE 25 MG: 25 TABLET, FILM COATED ORAL at 08:49

## 2022-09-25 RX ADMIN — SALINE NASAL SPRAY 2 SPRAY: 1.5 SOLUTION NASAL at 08:50

## 2022-09-25 RX ADMIN — TAMSULOSIN HYDROCHLORIDE 0.4 MG: 0.4 CAPSULE ORAL at 08:48

## 2022-09-25 RX ADMIN — DIGOXIN 125 MCG: 125 TABLET ORAL at 08:48

## 2022-09-25 RX ADMIN — HYDROXYZINE HYDROCHLORIDE 25 MG: 25 TABLET, FILM COATED ORAL at 21:37

## 2022-09-25 RX ADMIN — QUETIAPINE 25 MG: 25 TABLET ORAL at 12:58

## 2022-09-25 ASSESSMENT — ACTIVITIES OF DAILY LIVING (ADL)
ADLS_ACUITY_SCORE: 33

## 2022-09-25 NOTE — PLAN OF CARE
"[id number]:    Pt is A/O x 4.   SBA with walker.   VSS, RA. C/O baseline pain. Declined needing intervention when asked, stated \"it's tolerable\"   Tele NSR/ST.   Lung sounds clear throughout. Denies SOB.   LVAD WDL. No alarms this shift. Dressing CDI.  BS active x4. Still having worsening diarrhea. Adequate urine output via urinal.  Regular diet, tolerating well.  Mycophenolate stopped, rheumatology consulted for eval with medication and diarrhea. Started increasing taper dose of myfortic with out patient follow-up.  Plans for potential discharge tomorrow.   Patient currently resting in bed with call light in reach.                         "

## 2022-09-25 NOTE — CONSULTS
RHEUMATOLOGY CONSULT NOTE - FELLOW    Dandy Sands MRN# [id number]   Age: 61 year old YOB: 1960     Date of Admission:  9/18/2022  Reason for consult: Worsening diarrhea after restarting MMF    Assessment and Plan:     Mr. Sands is a 61-year-old male with history of SLE (+ LASHANDA, + dsDNA, + Le, + RNP, arthritis, photosensitivity, fatigue), with PE and DVT in the setting of antiphospholipid syndrome, ICM s/p LVAD (7/8/2022) and recent admission for C. difficile infection admitted on 9/18/2022 for diarrhea and low flow alarms.  Rheumatology consulted regarding worsening diarrhea in the setting of restarting MMF.    The patient reports some issues with loose stools since beginning MMF several years ago however these have been much worse in the past several weeks.  Patient has been afebrile, exam shows unremarkable abdominal exam, and MSK exam without synovitis.  Creatinine here within normal limits, hemoglobin low but stable at ~10. Lupus labs showed dsDNA of 13 in 7/22 improved from 22 in 5/22.  Complements were within normal limits in July.    Etiology for patient's worsening of diarrhea in the setting of restarting MMF may be related to this medication, particularly given its known potential GI side effects.  As the patient is without symptoms or signs of a current lupus flare, recommend discontinuing MMF at this time and beginning Myfortic at a lower dose with titration over the next several weeks.  Recommend patient have follow-up with primary care or rheumatology in 2 to 4 weeks to check in on tolerability of this new medication.  In addition the patient should follow-up with his primary rheumatologist in the next 8-10 weeks.     Recommendations:  -- Stop MMF  -- Start Myfortic 180 mg twice daily X 1 week, increase to 360 mg twice daily X 1 week, then 720 mg daily   -- Follow-up with primary or rheumatology in 2 to 4 weeks to assess medication tolerability  -- Follow-up with primary  rheumatologist in 8 to 10 weeks    The patient was seen and staffed with Dr. Sterling.     Ngoc Tinsley MD  Rheumatology Fellow  9/25/2022    I saw this patient with the Rheumatology Fellow. I agree with the findings and recommendations.    James Sterling M.D.  Staff Rheumatologist, [facility]  Pager [phone]           Chief Complaint:   Diarrhea after restarting MMF         History of Present Illness:     Mr. Sands is a 61-year-old male with history of SLE (+ LASHANDA, + dsDNA, + Le, + RNP, arthritis, photosensitivity, fatigue), with PE and DVT in the setting of antiphospholipid syndrome, ICM s/p LVAD (7/8/2022) and recent admission for C. difficile infection admitted on 9/18/2022 for diarrhea and low flow alarms.    The patient reports he has had some issues with loose stool for the past couple of years since starting MMF, however it is recently gotten much worse since the beginning of September.  The patient reports that while he was not taking CellCept over the past couple of days, his stool was improving. However after restarting last night, his stool has become watery this morning.  He denies abdominal pain, bloating, nausea, fever or chills.     The patient was diagnosed with lupus 15 to 20 years ago.  He was previously on hydroxychloroquine and azathioprine.  Azathioprine was switched to MMF in the summer 2020 when patient was having worsening arthritis symptoms.  He reports he has previously had pain in his hands, back, neck.  He is now having pain only in his back, neck, and shoulders that has been worsening over the past couple years.  The pain is worse in the morning, associated with 10 to 15 minutes of stiffness.  He denies red, warm, or swollen joints.    The patient was about to see a new rheumatologist at [facility] prior to his admission back during/early summer.  He had previously seen Dr. Stanley Garcia at [facility].  The patient notes he plans to return to therapy for another month before  going home to [email or web address].     Mr. Sands denies mouth sores, rash, finger color changes in the cold or with stress, finger ulceration, urinary changes, family history of lupus or other autoimmune disease.  He has had some chest pain since his surgery.            Past Medical History:     Past Medical History:   Diagnosis Date     Antiphospholipid antibody syndrome (H)      CAD (coronary artery disease)      Chronic systolic heart failure (H)      Ischemic cardiomyopathy      Mitral regurgitation      Systemic lupus erythematosus (H)              Past Surgical History:     Past Surgical History:   Procedure Laterality Date     COLONOSCOPY N/A 05/23/2022    Procedure: COLONOSCOPY;  Surgeon: Parth Meneses MD;  [facility]     CV CORONARY ANGIOGRAM N/A 05/24/2022    Procedure: Coronary Angiogram;  Surgeon: Mike Pope MD;  [facility]     CV CORONARY ANGIOGRAM N/A 05/29/2022    Procedure: Coronary Angiogram;  Surgeon: Austin Bright MD;  Location:  [facility] CARDIAC CATH LAB     CV CORONARY LITHOTRIPSY PCI Bilateral 05/24/2022    Procedure: Percutaneous Coronary Intervention - Lithotripsy;  Surgeon: Mike Pope MD;  Location:  HE[facility] CARDIAC CATH LAB     CV INTRA AORTIC BALLOON N/A 06/17/2022    Procedure: Intraprocedure Aortic Balloon Pump Insertion;  Surgeon: Sherman Cerda MD;  Location:  HEART CARDIAC CATH LAB     CV INTRA AORTIC BALLOON Right 07/03/2022    Procedure: Reposition of Subclavian Intraprocedure Aortic Balloon Pump;  Surgeon: Austin Bright MD;  Location:  HEART CARDIAC CATH LAB     CV INTRAVASULAR ULTRASOUND N/A 05/24/2022    Procedure: Intravascular Ultrasound;  Surgeon: Mike Pope MD;  Location: [facility] CARDIAC CATH LAB     CV PCI ANGIOPLASTY N/A 05/29/2022    Procedure: Percutaneous Transluminal Angioplasty;  Surgeon: Austin Birght MD;  Location: [facility] CARDIAC CATH LAB     CV PCI STENT DRUG ELUTING N/A 05/24/2022     Procedure: Percutaneous Coronary Intervention Stent;  Surgeon: Mike Pope MD;  Location:  HEART CARDIAC CATH LAB     CV RIGHT HEART CATH MEASUREMENTS RECORDED N/A 05/18/2022    Procedure: Right Heart Catheterization;  Surgeon: Justo Stewart MD;  Location:  HEART CARDIAC CATH LAB     CV RIGHT HEART CATH MEASUREMENTS RECORDED N/A 05/24/2022    Procedure: Right Heart Catheterization;  Surgeon: Mike Pope MD;  Location:  HEART CARDIAC CATH LAB     CV RIGHT HEART CATH MEASUREMENTS RECORDED N/A 05/29/2022    Procedure: Right Heart Catheterization;  Surgeon: Austin Bright MD;  Location:  HEART CARDIAC CATH LAB     CV RIGHT HEART CATH MEASUREMENTS RECORDED N/A 07/01/2022    Procedure: Right Heart Catheterization;  Surgeon: Mike Pope MD;  Location:  HEART CARDIAC CATH LAB     CV RIGHT HEART EXERCISE STRESS STUDY N/A 05/18/2022    Procedure: Stress Drug Study;  Surgeon: Justo Stewart MD;  Location:  HEART CARDIAC CATH LAB     CV SWAN CHOCO PROCEDURE N/A 06/17/2022    Procedure: [place] Procedure;  Surgeon: Sherman Cerda MD;  Location:  HEART CARDIAC CATH LAB     INCISION AND DRAINAGE CHEST WASHOUT, COMBINED N/A 07/11/2022    Procedure: Chest washout and closure;  Surgeon: Darnell Morgan MD;  [facility]     INCISION AND DRAINAGE CHEST WASHOUT, COMBINED N/A 07/12/2022    Procedure: INCISION AND DRAINAGE, WOUND, CHEST, WITH IRRIGATION;  Surgeon: Darius Zamora MD;  [facility]: [facility]     INSERT ARTERIAL LINE  07/18/2022          INSERT INTRAAORTIC BALLOON PUMP Right 06/23/2022    Procedure: INSERTION, INTRA-AORTIC BALLOON PUMP RIGHT SUBCLAVIAN ARTERY.;  Surgeon: Hayden Veras MD;  [facility]     INSERT VENTRICULAR ASSIST DEVICE LEFT (HEARTMATE II/III) MINIMALLY INVASIVE N/A 07/08/2022    Procedure: MEDIAN STERNOTOMY.  CARDIOPULMONARY BYPASS.  INTRAOPERATIVE TRANSESOPHAGEAL ECHOCARDIOGRAM.  IMPLANT LEFT VENTRICULAR ASSIST DEVICE HEARTMATE III.  PLACEMENT OF  PERCUTANEOUS RIGHT VENTRICULAR ASSIST DEVICE.  TEMPORARY CHEST CLOSURE;  Surgeon: Darius Zamora MD;  [facility]     IR CHEST TUBE PLACEMENT NON-TUNNELED RIGHT  08/01/2022     IRRIGATION AND DEBRIDEMENT CHEST WASHOUT, COMBINED N/A 07/13/2022    Procedure: IRRIGATION AND DEBRIDEMENT, CHEST;  Surgeon: Darius Zamora MD;  [facility]: [facility]     MIDLINE DOUBLE LUMEN PLACEMENT Left 09/11/2022    Mid line ok to use     MIDLINE DOUBLE LUMEN PLACEMENT Right 09/14/2022    5FR DL midline.     PICC DOUBLE LUMEN PLACEMENT Right 05/28/2022    right basilic 5 fr dl picc 40 cm     PICC DOUBLE LUMEN PLACEMENT Right 08/15/2022    Right Lateral, 41 total length, 3 cm external length            Social History:     Social History     Socioeconomic History     Marital status: Single     Spouse name: Not on file     Number of children: Not on file     Years of education: Not on file     Highest education level: Not on file   Occupational History     Not on file   Tobacco Use     Smoking status: Not on file     Smokeless tobacco: Not on file   Substance and Sexual Activity     Alcohol use: Not on file     Drug use: Not on file     Sexual activity: Not on file   Other Topics Concern     Not on file   Social History Narrative     Not on file     Social Determinants of Health     Financial Resource Strain: Not on file   Food Insecurity: Not on file   Transportation Needs: Not on file   Physical Activity: Not on file   Stress: Not on file   Social Connections: Not on file   Intimate Partner Violence: Not on file   Housing Stability: Not on file               Family History:     Denies family history of autoimmune disease.       Immunizations:     Most Recent Immunizations   Administered Date(s) Administered     COVID-19,PF,Moderna 08/24/2021     Influenza Vaccine IM > 6 months Valent IIV4 (Alfuria,Fluzone) 12/17/2021     Pneumo Conj 13-V (2010&after) 04/09/2018     Tdap (Adult) Unspecified Formulation 03/14/2016     Zoster  vaccine recombinant adjuvanted (SHINGRIX) 10/18/2019             Allergies:   No Known Allergies          Medications:     Medications Prior to Admission   Medication Sig Dispense Refill Last Dose     acetaminophen (TYLENOL) 325 MG tablet Take 3 tablets (975 mg) by mouth every 8 hours for 30 days 270 tablet 0      atorvastatin (LIPITOR) 40 MG tablet Take 1 tablet (40 mg) by mouth every evening for 30 days 30 tablet 0      digoxin (LANOXIN) 125 MCG tablet Take 1 tablet (125 mcg) by mouth daily for 30 days 30 tablet 0      fluticasone-vilanterol (BREO ELLIPTA) 100-25 MCG/INH inhaler Inhale 1 puff into the lungs daily for 30 days 30 each 0 Unknown at Unknown time     hydrALAZINE (APRESOLINE) 50 MG tablet Take 1 tablet (50 mg) by mouth 3 times daily for 30 days 90 tablet 0      hydrocortisone 1 % CREA cream Place rectally 3 times daily 28 g 0      hydroxychloroquine (PLAQUENIL) 200 MG tablet Take 1 tablet (200 mg) by mouth 2 times daily 60 tablet 0      hydrOXYzine (ATARAX) 25 MG tablet Take 1 tablet (25 mg) by mouth every 6 hours as needed for anxiety (ANXIETY) 60 tablet 0      lisinopril (ZESTRIL) 10 MG tablet Take 1 tablet (10 mg) by mouth daily for 30 days 30 tablet 0      Melatonin 10 MG TABS tablet Take 1 tablet (10 mg) by mouth At Bedtime for 30 days 30 tablet 0      multivitamin w/minerals (THERA-VIT-M) tablet Take 1 tablet by mouth daily  0      mycophenolate (GENERIC EQUIVALENT) 500 MG tablet Take 3 tablets (1,500 mg) by mouth 2 times daily for 30 days 180 tablet 0      pantoprazole (PROTONIX) 40 MG EC tablet Take 1 tablet (40 mg) by mouth daily 30 tablet 0      pramox-pe-glycerin-petrolatum (PREPARATION H) 1-0.25-14.4-15 % CREA cream Place rectally 2 times daily as needed for hemorrhoids Apply immediately after a bowel movement. 51 g 0      promethazine (PHENERGAN) 12.5 MG tablet Take 1 tablet (12.5 mg) by mouth every 6 hours as needed for nausea or vomiting 30 tablet 1      QUEtiapine (SEROQUEL) 25 MG tablet  Take 1 tablet (25 mg) by mouth 2 times daily for 30 days 60 tablet 0      QUEtiapine (SEROQUEL) 50 MG tablet Take 3 tablets (150 mg) by mouth At Bedtime for 30 days 90 tablet 0      saline nasal (AYR SALINE) GEL topical gel Apply into each nare At Bedtime        sertraline (ZOLOFT) 100 MG tablet Take 1 tablet (100 mg) by mouth daily for 30 days 30 tablet 0      sodium chloride (OCEAN) 0.65 % nasal spray Spray 2 sprays into both nostrils every 4 hours (while awake)        tamsulosin (FLOMAX) 0.4 MG capsule Take 0.4 mg by mouth daily        thiamine (B-1) 100 MG tablet Take 1 tablet (100 mg) by mouth daily 30 tablet 0      vancomycin (VANCOCIN) 125 MG capsule Take 1 capsule (125 mg) by mouth 4 times daily 28 capsule 0      warfarin ANTICOAGULANT (COUMADIN) 2.5 MG tablet Take 2 tabs (5mg) daily at bedtime. Future dose adjustments pending INR 72 tablet 0      zolpidem (AMBIEN) 5 MG tablet Take 5 mg by mouth nightly as needed for sleep          Current Facility-Administered Medications   Medication     acetaminophen (TYLENOL) tablet 975 mg     atorvastatin (LIPITOR) tablet 40 mg     Continuing ACE inhibitor/ARB/ARNI from home medication list OR ACE inhibitor/ARB/ARNI order already placed during this visit     digoxin (LANOXIN) tablet 125 mcg     HOLD nitroGLYcerin IF     hydrALAZINE (APRESOLINE) tablet 25 mg     hydrocortisone 1 % cream CREA     hydroxychloroquine (PLAQUENIL) tablet 200 mg     hydrOXYzine (ATARAX) tablet 25 mg     lisinopril (ZESTRIL) tablet 10 mg     melatonin tablet 3 mg     methylcellulose (CITRUCEL) tablet 1,000 mg     methylphenidate (RITALIN) tablet 5 mg     multivitamin w/minerals (THERA-VIT-M) tablet 1 tablet     [Held by provider] mycophenolate (GENERIC EQUIVALENT) tablet 1,500 mg     [Held by provider] pantoprazole (PROTONIX) EC tablet 40 mg     Patient is already receiving anticoagulation with heparin, enoxaparin (LOVENOX), warfarin (COUMADIN)  or other anticoagulant medication      "pramox-pe-glycerin-petrolatum (PREPARATION H) cream     QUEtiapine (SEROquel) tablet 150 mg     QUEtiapine (SEROquel) tablet 25 mg     QUEtiapine (SEROquel) tablet 25 mg     Reason beta blocker not prescribed     sertraline (ZOLOFT) tablet 75 mg     sodium chloride (OCEAN) 0.65 % nasal spray 2 spray     tamsulosin (FLOMAX) capsule 0.4 mg     thiamine (B-1) tablet 100 mg     warfarin ANTICOAGULANT (COUMADIN) tablet 7.5 mg     Warfarin Dose Required Daily - Pharmacist Managed            Review of Systems:   Complete 12 point ROS completed and negative unless mentioned in HPI.          Physical Exam:   BP 98/77 (BP Location: Right arm)   Pulse 88   Temp 97.4  F (36.3  C) (Oral)   Resp (!) 87   Ht 1.702 m (5' 7\")   Wt 59.9 kg (132 lb)   SpO2 99%   BMI 20.67 kg/m      General: appears well, NAD  HEENT: Sclera non-injected, non-icteric. Oropharynx without ulcers or lesions.  CV: Mechanical hum  Lung: Breath sounds present bilaterally, no wheezes rales or rubs.  Abdomen: Soft, non-distended, nontender, no organomegaly noted.  Neuro: Awake, alert, CN grossly intact.   Skin/Hair: no rashes or nail changes observed. No alopecia  Msk:   Shoulders: No tenderness to palpation along glenohumeral joint . No effusion, warmth.   Elbows: with full extension and flexion, without effusion, erythema or increased warmth  Wrists: with full extension and flexion, without effusion, erythema or increased warmth  Hands: MCPs, PIPs, DIPs without effusion, erythema or increased warmth  Hips: no pain with flexion, internal or external rotation  L knee: No effusion, increased warmth or erythema. Flexion >120 degrees, full extension  R knee: No effusion, increased warmth or erythema. Flexion >120 degrees, full extension  Bilateral ankles: inversion/eversion, plantar and dorsiflexion intact. No joint effusion.  Feet: No effusion of MTPs, PIPs, DIPs noted            Data:   Labs and imaging reviewed.     Ngoc Tinsley MD  Rheumatology " Fellow  9/25/2022

## 2022-09-25 NOTE — PLAN OF CARE
Patient continues to have diarrhea, no IV access and magnesium replaced orally. Cards 2 aware. VAD numbers WDL, no low-flow alarms. VAD dressing changed. Patient SBA with no reported dizziness. VSS

## 2022-09-25 NOTE — PLAN OF CARE
"D: Patient admitted 9/18 for low flow alarms and ongoing profuse diarrhea. Hx. SLE, antiphospholipid syndrome, pulmonary embolism (on warfarin), ICM (EF 10-15%) s/p HM3 LVAD (7/28/22), and C. Diff infection.   I/A: A&Ox4. VSSA on RA. SR/ST 90s-100s w/occ-freq PVCs. LVAD #s wnl, no alarms. C/o R shoulder joint aching partially relieved by prn tylenol. No IV access (per previous RN notes \"ok per Cards 2\"). Adequate uop. Continues to have loose stools. SBA w/walker. PRN atarax at HS. Appeared to rest comfortably overnight.   P: Continue to monitor and notify Cards 2 with questions/concerns.     *Patient has been using purple cleaning wipes for hands after using the restroom. Educated patient on importance of thorough hand-washing w/soap and water and bleach wipes for cleaning surfaces with ongoing C.diff infection/treatment and to prevent reoccurrence.     "

## 2022-09-25 NOTE — PROGRESS NOTES
I personally saw and examined this patient with the resident.  White male recovering from LVAD insertion complicated by diarrhea.  Today, reintroduction of MFM is associated with recurrence of diarrhea.  He is on 1500 BID for which dosing could be reduced or switch to myfortic which has lower incidence of GI toxicity.  Spoke with rheumatology who wishes to us myfortic.          [facility]    Cardiology Progress Note- Cardiology 2    Date of Admission:  9/18/2022     Assessment & Plan: HVSL   Dandy Sands is a 61 year old male with a PMHx of SLE, antiphospholipid syndrome, pulmonary embolism (on warfarin), ICM (EF 10-15%) s/p HM3 LVAD (7/28/22), and C. Diff infection (on PO vancomycin taper) who is currently admitted 9/18/2022 for low flow alarms and ongoing profuse diarrhea. Completed course of po vancomycin, encouraging fluids, and trialing stool bulking agents with improvement in diarrhea and lightheadedness. No low flow alarms since alarm threshold adjusted to 2.      Changes today 9/25:   - methylcellulose adjusted to 1000mg bid from 1500 mg daily  - holding PTA mycophenolate 1500mg bid in the setting of worsening of diarrhea after resumption (2 doses)  - Rheum consult for assistance with management of mycophenolate and consideration of med adjustment in the setting of diarrhea     #LVAD low flow alarms, resolved  #Diarrhea, improved  #C. Diff infection (positive 9/7/2022), treated  Prior to admit patient was on a 14-day course of 125 mg PO vancomycin qid which was set to go through 9/21/22. Patient denies any missed doses. He states he initially improved with PO vancomycin, but over the past day prior to admission his symptoms have been worsening (3 episodes of non-bloody diarrhea per day, low energy, lack of appetite). CT A/P in the ED negative for toxic megacolon. ID thinks diarrhea is less likely to be from recurrent C. Diff. Trialing stool bulking  agents with improvement. CTA in August 2022 with normal outflow graft. Waveforms reviewed by LVAD engineers and it is not a device issue. Overall, diarrhea and lightheadedness have been improving, and he has had no low flow alarms since threshold for alarm was lowered to 2.  - ID consult- unlikely to have C. Diff recurrence   - GI consult- methylcellulose. Adjusted to 1000mg bid today   - Regular diet without fluid restriction  - Calorie counts  - encouraged fluids  - completed 14-day course of po vancomycin 125 mg QID (last day  9/21/2022)  - Rheum consult as below and mycophenolate held as below given worsening of diarrhea after resumption of mycophenolate     #Stage D HFrEF 2/2 ICM (EF 10-15%) s/p HM3 LVAD (7/28/22) and CRT-D ('06)  #CAD s/p PCI to LAD ('05)  - LVAD: LVAD speed 5100 (reduced from 5200 9/19/2022)  - Volume status: Mildly hypovolemic to euvolemic (weight on 9/15 discharge 134 lbs, weight this admit 132 lbs)   - GDMT              > BB: Discontinued during previous admission with cardiogenic shock              > ACEi/ARB/ARNi: PTA lisinopril 10 mg daily              > MRA: None               > SGLT2i: None   - SCD ppx: s/p CRT-D (2006)  - AC: Pharmacy to dose warfarin               > on 5 mg daily at home (INR goal 2-3)  - PTA digoxin 125 mcg daily  - PTA atorvastatin 40 mg daily  - Telemetry monitoring     RHC 9/23  RA 10/11/8 mmHg  RV 19/8 mmHg  PA 18/10/13 mmHg  CWP 5/4/4 mmHg  CO (Marley) 3.7 L/min  CI (Marley) 2.2 L/min/m2  CO (TD) 3.27 L/min  CI (TD) 1.94 L/min/m2    #SLE  #APS  #Hx of DVT/PE  On warfarin PTA (INR goal 2-3). Decreased to 5 mg daily during last admission due to mucus membrane bleeding.  - Pharmacy to dose warfarin  - PTA hydroxychloroquine 200mg daily  - holding PTA mycophenolate 1500mg bid in the setting of worsening of diarrhea after resumption (2 doses)  - Rheum consult for assistance with management of mycophenolate and consideration of med adjustment in the setting of  diarrhea     #HTN  - continue hydralazine 25mg TID (pta was on 50mg TID, dose decreased 9/23 because of low flows)  - PTA lisinopril 10 mg daily     #Depression  #MIGUEL  Has had low mood with multiple hospitalizations post LVAD.   - Psychiatry consult, appreciate recs  - Psychology consult, appreciate recs  - PTA seroquel 25 mg QAM, 25 mg at noon, 150 mg QPM  - continue sertraline 75mg daily (pta was on 100mg daily, decreased on 9/23 due to risk of diarrhea)  - continue Ritalin 5mg in the morning and early afternoon per Psychiatry recs- started this admission (started 9/20, can increase to 10mg per dose in a few days if tolerated)  - PTA hydroxyzine prn     #GERD  #Dysphagia  - stopped pta pantoprazole 40mg daily on 9/23 because of risk of diarrhea     #Severe malnutrition in the context of acute on chronic illness  - Nutrition consult  - PT/OT consult  - PTA thiamine 100 mg      #BPH  - PTA tamsulosin 0.4mg daily     #Mild-moderate emphysema  - Not currently using any PTA inhalers        Diet:  Regular diet  DVT Prophylaxis: Warfarin  Fitzgerald Catheter: Not present  Code Status:  Full code       Disposition Plan   Expected discharge: Tomorrow, recommended to prior living arrangement once  diarrhea improvement, reduction in low flow alarms .       The patient's care was discussed with Dr. Colt Can MD  IM PGY2  [facility]    ______________________________________________________________________    Interval History   NAEON. Nursing notes reviewed. Had a formed BM yesterday morning and a loose BM yesterday evening that he was incontinent of. No BM overnight, but then had another watery BM this AM after breakfast. No fevers, chills, chest pain, shortness of breath, abdominal pain, nausea, vomiting. No new low flow alarms.    Data reviewed today: I reviewed all medications, new labs and imaging results over the last 24 hours.      Physical Exam   Vital Signs: Temp: 97  F (36.1  C) Temp src: Oral BP: 94/64 Pulse: 86   Resp: (!) 95 SpO2: 98 % O2 Device: None (Room air)    Weight: 132 lbs 0 oz  General: lying in bed, NAD  HEENT: no scleral icterus or conjunctival injection, oropharynx clear  Resp: clear to auscultation bilaterally, no crackles or wheezes  Cardiac: LVAD hum. JVD at lower neck lying at 20 degrees.  Abdomen: soft, non-tender, non-distended. No rebound or guarding. Driveline site with gauze without swelling or tenderness.   Extremities: warm, no lower extremity edema  MSK: cachetic   Neuro: alert, answers questions appropriately  Psych: appropriate mood and affect

## 2022-09-26 LAB
ANION GAP SERPL CALCULATED.3IONS-SCNC: 11 MMOL/L (ref 7–15)
BASOPHILS # BLD AUTO: 0 10E3/UL (ref 0–0.2)
BASOPHILS NFR BLD AUTO: 1 %
BUN SERPL-MCNC: 17.5 MG/DL (ref 8–23)
CALCIUM SERPL-MCNC: 9.6 MG/DL (ref 8.8–10.2)
CHLORIDE SERPL-SCNC: 106 MMOL/L (ref 98–107)
CREAT SERPL-MCNC: 0.64 MG/DL (ref 0.67–1.17)
DEPRECATED HCO3 PLAS-SCNC: 20 MMOL/L (ref 22–29)
EOSINOPHIL # BLD AUTO: 0.1 10E3/UL (ref 0–0.7)
EOSINOPHIL NFR BLD AUTO: 3 %
ERYTHROCYTE [DISTWIDTH] IN BLOOD BY AUTOMATED COUNT: 16.4 % (ref 10–15)
GFR SERPL CREATININE-BSD FRML MDRD: >90 ML/MIN/1.73M2
GLUCOSE SERPL-MCNC: 111 MG/DL (ref 70–99)
HCT VFR BLD AUTO: 32.1 % (ref 40–53)
HGB BLD-MCNC: 10.6 G/DL (ref 13.3–17.7)
IMM GRANULOCYTES # BLD: 0 10E3/UL
IMM GRANULOCYTES NFR BLD: 0 %
INR PPP: 2.37 (ref 0.85–1.15)
LYMPHOCYTES # BLD AUTO: 1.3 10E3/UL (ref 0.8–5.3)
LYMPHOCYTES NFR BLD AUTO: 23 %
MAGNESIUM SERPL-MCNC: 1.7 MG/DL (ref 1.7–2.3)
MCH RBC QN AUTO: 30.9 PG (ref 26.5–33)
MCHC RBC AUTO-ENTMCNC: 33 G/DL (ref 31.5–36.5)
MCV RBC AUTO: 94 FL (ref 78–100)
MONOCYTES # BLD AUTO: 0.5 10E3/UL (ref 0–1.3)
MONOCYTES NFR BLD AUTO: 10 %
NEUTROPHILS # BLD AUTO: 3.4 10E3/UL (ref 1.6–8.3)
NEUTROPHILS NFR BLD AUTO: 63 %
NRBC # BLD AUTO: 0 10E3/UL
NRBC BLD AUTO-RTO: 0 /100
PLATELET # BLD AUTO: 221 10E3/UL (ref 150–450)
POTASSIUM SERPL-SCNC: 4.2 MMOL/L (ref 3.4–5.3)
RBC # BLD AUTO: 3.43 10E6/UL (ref 4.4–5.9)
SODIUM SERPL-SCNC: 137 MMOL/L (ref 136–145)
WBC # BLD AUTO: 5.3 10E3/UL (ref 4–11)

## 2022-09-26 PROCEDURE — 85610 PROTHROMBIN TIME: CPT

## 2022-09-26 PROCEDURE — 36415 COLL VENOUS BLD VENIPUNCTURE: CPT

## 2022-09-26 PROCEDURE — 85025 COMPLETE CBC W/AUTO DIFF WBC: CPT

## 2022-09-26 PROCEDURE — 250N000013 HC RX MED GY IP 250 OP 250 PS 637: Performed by: INTERNAL MEDICINE

## 2022-09-26 PROCEDURE — 83735 ASSAY OF MAGNESIUM: CPT | Performed by: INTERNAL MEDICINE

## 2022-09-26 PROCEDURE — 250N000013 HC RX MED GY IP 250 OP 250 PS 637

## 2022-09-26 PROCEDURE — 250N000012 HC RX MED GY IP 250 OP 636 PS 637

## 2022-09-26 PROCEDURE — 214N000001 HC R&B CCU UMMC

## 2022-09-26 PROCEDURE — 80048 BASIC METABOLIC PNL TOTAL CA: CPT

## 2022-09-26 PROCEDURE — 99232 SBSQ HOSP IP/OBS MODERATE 35: CPT | Mod: GC | Performed by: INTERNAL MEDICINE

## 2022-09-26 RX ORDER — MAGNESIUM OXIDE 400 MG/1
400 TABLET ORAL EVERY 4 HOURS
Status: DISCONTINUED | OUTPATIENT
Start: 2022-09-26 | End: 2022-09-26

## 2022-09-26 RX ORDER — MAGNESIUM SULFATE HEPTAHYDRATE 40 MG/ML
4 INJECTION, SOLUTION INTRAVENOUS ONCE
Status: DISCONTINUED | OUTPATIENT
Start: 2022-09-26 | End: 2022-09-26

## 2022-09-26 RX ORDER — WARFARIN SODIUM 7.5 MG/1
7.5 TABLET ORAL
Status: COMPLETED | OUTPATIENT
Start: 2022-09-26 | End: 2022-09-26

## 2022-09-26 RX ADMIN — SALINE NASAL SPRAY 2 SPRAY: 1.5 SOLUTION NASAL at 18:19

## 2022-09-26 RX ADMIN — METHYLPHENIDATE HYDROCHLORIDE 5 MG: 5 TABLET ORAL at 08:27

## 2022-09-26 RX ADMIN — HYDRALAZINE HYDROCHLORIDE 25 MG: 25 TABLET, FILM COATED ORAL at 13:24

## 2022-09-26 RX ADMIN — QUETIAPINE 25 MG: 25 TABLET ORAL at 08:27

## 2022-09-26 RX ADMIN — Medication 3 MG: at 20:56

## 2022-09-26 RX ADMIN — HYDRALAZINE HYDROCHLORIDE 25 MG: 25 TABLET, FILM COATED ORAL at 08:26

## 2022-09-26 RX ADMIN — HYDROXYZINE HYDROCHLORIDE 25 MG: 25 TABLET, FILM COATED ORAL at 20:55

## 2022-09-26 RX ADMIN — QUETIAPINE 25 MG: 25 TABLET ORAL at 10:49

## 2022-09-26 RX ADMIN — METHYLPHENIDATE HYDROCHLORIDE 5 MG: 5 TABLET ORAL at 13:24

## 2022-09-26 RX ADMIN — SALINE NASAL SPRAY 2 SPRAY: 1.5 SOLUTION NASAL at 20:58

## 2022-09-26 RX ADMIN — MYCOPHENOLIC ACID 180 MG: 180 TABLET, DELAYED RELEASE ORAL at 18:18

## 2022-09-26 RX ADMIN — HYDROXYCHLOROQUINE SULFATE 200 MG: 200 TABLET, FILM COATED ORAL at 08:26

## 2022-09-26 RX ADMIN — WARFARIN SODIUM 7.5 MG: 7.5 TABLET ORAL at 18:18

## 2022-09-26 RX ADMIN — DIGOXIN 125 MCG: 125 TABLET ORAL at 08:27

## 2022-09-26 RX ADMIN — TAMSULOSIN HYDROCHLORIDE 0.4 MG: 0.4 CAPSULE ORAL at 08:27

## 2022-09-26 RX ADMIN — HYDROXYCHLOROQUINE SULFATE 200 MG: 200 TABLET, FILM COATED ORAL at 20:55

## 2022-09-26 RX ADMIN — METHYLCELLULOSE 1000 MG: 500 TABLET ORAL at 08:26

## 2022-09-26 RX ADMIN — SERTRALINE 75 MG: 25 TABLET, FILM COATED ORAL at 08:27

## 2022-09-26 RX ADMIN — Medication 150 MG: at 20:54

## 2022-09-26 RX ADMIN — MAGNESIUM OXIDE TAB 400 MG (241.3 MG ELEMENTAL MG) 400 MG: 400 (241.3 MG) TAB at 08:27

## 2022-09-26 RX ADMIN — MYCOPHENOLIC ACID 180 MG: 180 TABLET, DELAYED RELEASE ORAL at 08:36

## 2022-09-26 RX ADMIN — HYDRALAZINE HYDROCHLORIDE 25 MG: 25 TABLET, FILM COATED ORAL at 20:55

## 2022-09-26 RX ADMIN — LISINOPRIL 10 MG: 10 TABLET ORAL at 08:28

## 2022-09-26 RX ADMIN — SALINE NASAL SPRAY 2 SPRAY: 1.5 SOLUTION NASAL at 13:24

## 2022-09-26 RX ADMIN — Medication 1 TABLET: at 10:50

## 2022-09-26 RX ADMIN — METHYLCELLULOSE 500 MG: 500 TABLET ORAL at 10:49

## 2022-09-26 RX ADMIN — METHYLCELLULOSE 1000 MG: 500 TABLET ORAL at 20:54

## 2022-09-26 RX ADMIN — THIAMINE HCL TAB 100 MG 100 MG: 100 TAB at 08:26

## 2022-09-26 RX ADMIN — ACETAMINOPHEN 975 MG: 325 TABLET, FILM COATED ORAL at 16:21

## 2022-09-26 RX ADMIN — ATORVASTATIN CALCIUM 40 MG: 40 TABLET, FILM COATED ORAL at 20:55

## 2022-09-26 ASSESSMENT — ACTIVITIES OF DAILY LIVING (ADL)
ADLS_ACUITY_SCORE: 33
ADLS_ACUITY_SCORE: 34
ADLS_ACUITY_SCORE: 34
ADLS_ACUITY_SCORE: 33

## 2022-09-26 NOTE — PLAN OF CARE
Temp: 97.6  F (36.4  C) Temp src: Oral BP: 99/82 Pulse: 106   Resp: 16 SpO2: 96 % O2 Device: None (Room air)       D: LVAD insertion complications, low flow alarms, lightheadedness, profuse diarrhea  History of SLE, ICM, HFrEF, LVAD - HM3 (7/11), C. Diff, antiphospholipid syndrome, PE    I/A: Pt is AOx4. Tele in place, SR (intermittent ST) w/ BBB, PVC. VSS on RA. No IV access in place (with C2 approval). Up standby to bathroom. Pt reported neck/shoulder pain at bedtime, administered PRN tylenol. Denies pain this morning. Slept well overnight.    PRN: tylenol, atarax, melatonin    P: Continue to monitor and follow POC. Plan for possible discharge today. Notify CARDS 2 with changes.

## 2022-09-26 NOTE — PLAN OF CARE
Cellcept changed to Myfortic. Intermittent sinus tach with pacing. Otherwise, sinus with right BBB. PI this AM 8.0, continues to have diarrhea and dizziness with ambulation. Cards 2 informed. Mag protocol (RN managed) canceled by MD. VSS, afebrile. VAD numbers WDL. Dressing changed. To discharge to [facility] with family when medically stable. Discharge planning in progress; home care referral placed.

## 2022-09-26 NOTE — PROGRESS NOTES
Care Management Follow Up    Length of Stay (days): 8    Expected Discharge Date: 09/26/2022     Concerns to be Addressed:  Discharge planning     Patient plan of care discussed at interdisciplinary rounds: Yes    Anticipated Discharge Disposition:  [facility]     Anticipated Discharge Services:  Skilled home care services  Anticipated Discharge DME:  None    Additional Information:  In 6C Discharge Rounds it was reported if diarrhea improves pt can discharge later today; otherwise he will discharge tomorrow.   Noted on recent hospital discharge, 9-, pt was referred to [facility] for skilled nursing services. Added orders for skilled home care RNC to the Discharge Orders. Notified [facility] pt may discharge today.       Linda Dasilva, RN Care Coordinator (6C RNCC coverage)  [facility]  [facility]  On 9- call [phone] or Page: [phone]      [facility] (nursing)  Phone: [phone]

## 2022-09-26 NOTE — PROGRESS NOTES
[facility]    Cardiology Progress Note- Cardiology 2    I personally saw and examined this patient with resident, reviewed imaging and laboratory studies, confirmed physical examination and discussed results and plan with patient .  The patient has recurrence of diarrhea related either to substitution of PO magnesium or continuing use of micophenolate.  Cancel anticipated discharge.    Date of Admission:  9/18/2022     Assessment & Plan: MARISOL Dorman EDUARD Sands is a 61 year old male with a PMHx of SLE, antiphospholipid syndrome, pulmonary embolism (on warfarin), ICM (EF 10-15%) s/p HM3 LVAD (7/28/22), and C. Diff infection (on PO vancomycin taper) who is currently admitted 9/18/2022 for low flow alarms and ongoing profuse diarrhea. Completed course of po vancomycin, encouraging fluids, and trialing stool bulking agents. No low flow alarms since alarm threshold adjusted to 2.      Changes today 9/26:   - Reaching out to Rheum regarding back up plan in the event diarrhea does not improve with transitioning MMF to Myfortic  - Discontinued Mg replacement protocol because Mg oxide likely contributing to diarrhea     #LVAD low flow alarms, resolved  #Diarrhea  #C. Diff infection (positive 9/7/2022), treated  Prior to admit patient was on a 14-day course of 125 mg PO vancomycin qid which was set to go through 9/21/22. Patient denies any missed doses. He states he initially improved with PO vancomycin, but over the past day prior to admission his symptoms have been worsening (3 episodes of non-bloody diarrhea per day, low energy, lack of appetite). CT A/P in the ED negative for toxic megacolon. ID thinks diarrhea is less likely to be from recurrent C. Diff. Trialing stool bulking agents. CTA in August 2022 with normal outflow graft. Waveforms reviewed by LVAD engineers and it is not a device issue. Diarrhea worsened after resumption of MMF, so Rheum was consulted and MMF  was switched to Myfortic. Monitoring for response.   - ID consult- unlikely to have C. Diff recurrence   - GI consult- methylcellulose. At 1000mg bid currently   - Regular diet without fluid restriction  - Calorie counts  - encouraged fluids  - completed 14-day course of po vancomycin 125 mg QID (last day  9/21/2022)  - Discontinued Mg replacement protocol because Mg oxide likely contributing to diarrhea     #Stage D HFrEF 2/2 ICM (EF 10-15%) s/p HM3 LVAD (7/28/22) and CRT-D ('06)  #CAD s/p PCI to LAD ('05)  - LVAD: LVAD speed 5100 (reduced from 5200 9/19/2022)  - Volume status: Mildly hypovolemic to euvolemic (weight on 9/15 discharge 134 lbs, weight this admit 132 lbs)   - GDMT              > BB: Discontinued during previous admission with cardiogenic shock              > ACEi/ARB/ARNi: PTA lisinopril 10 mg daily              > MRA: None               > SGLT2i: None   - SCD ppx: s/p CRT-D (2006)  - AC: Pharmacy to dose warfarin               > on 5 mg daily at home (INR goal 2-3)  - PTA digoxin 125 mcg daily  - PTA atorvastatin 40 mg daily  - Telemetry monitoring     RHC 9/23  RA 10/11/8 mmHg  RV 19/8 mmHg  PA 18/10/13 mmHg  CWP 5/4/4 mmHg  CO (Marley) 3.7 L/min  CI (Marley) 2.2 L/min/m2  CO (TD) 3.27 L/min  CI (TD) 1.94 L/min/m2    #SLE  #APS  #Hx of DVT/PE  On warfarin PTA (INR goal 2-3). Decreased to 5 mg daily during last admission due to mucus membrane bleeding.  - Pharmacy to dose warfarin  - PTA hydroxychloroquine 200mg daily  - Rheum consult for assistance with management of mycophenolate and consideration of med adjustment in the setting of diarrhea   - discontinued PTA mycophenolate 1500mg bid 9/25   - started Myfortic 9/5 (Myfortic 180 mg twice daily X 1 week, increase to 360 mg twice daily X 1 week, then 720 mg daily )   - Follow-up with primary or rheumatology in 2 to 4 weeks to assess medication tolerability   - Follow-up with primary rheumatologist in 8 to 10 weeks      #HTN  - continue hydralazine 25mg  TID (pta was on 50mg TID, dose decreased 9/23 because of low flows)  - PTA lisinopril 10 mg daily     #Depression  #MIGUEL  Has had low mood with multiple hospitalizations post LVAD.   - Psychiatry consult, appreciate recs  - Psychology consult, appreciate recs  - PTA seroquel 25 mg QAM, 25 mg at noon, 150 mg QPM  - continue sertraline 75mg daily (pta was on 100mg daily, decreased on 9/23 due to risk of diarrhea)  - continue Ritalin 5mg in the morning and early afternoon per Psychiatry recs- started this admission (started 9/20, can increase to 10mg per dose in a few days if tolerated)  - PTA hydroxyzine prn     #GERD  #Dysphagia  - stopped pta pantoprazole 40mg daily on 9/23 because of risk of diarrhea     #Severe malnutrition in the context of acute on chronic illness  - Nutrition consult  - PT/OT consult  - PTA thiamine 100 mg      #BPH  - PTA tamsulosin 0.4mg daily     #Mild-moderate emphysema  - Not currently using any PTA inhalers        Diet:  Regular diet  DVT Prophylaxis: Warfarin  Fitzgerald Catheter: Not present  Code Status:  Full code       Disposition Plan   Expected discharge: Tomorrow, recommended to prior living arrangement once  diarrhea improvement, reduction in low flow alarms .       The patient's care was discussed with Dr. Colt Can MD  IM PGY2  [facility]    ______________________________________________________________________    Interval History   NAEON. Nursing notes reviewed. Was incontinent of water stool yesterday evening and this morning. No BM during the night. No fevers, chills, chest pain, shortness of breath, abdominal pain, nausea, vomiting, falls, loss of consciousness.     Data reviewed today: I reviewed all medications, new labs and imaging results over the last 24 hours.     Physical Exam   Vital Signs: Temp: 97.6  F (36.4  C) Temp src: Oral BP: 99/82 Pulse: 80   Resp: 16 SpO2: 96 % O2  Device: None (Room air)    Weight: 131 lbs 9.6 oz  General: lying in bed, NAD  HEENT: no scleral icterus or conjunctival injection, oropharynx clear  Resp: clear to auscultation bilaterally, no crackles or wheezes  Cardiac: LVAD hum. JVD at lower/mid neck lying at 20 degrees.  Abdomen: soft, non-tender, non-distended. No rebound or guarding. Driveline site with gauze without swelling or tenderness.   Extremities: warm, no lower extremity edema  MSK: cachetic   Neuro: alert, answers questions appropriately  Psych: appropriate mood and affect

## 2022-09-27 ENCOUNTER — APPOINTMENT (OUTPATIENT)
Dept: PHYSICAL THERAPY | Facility: CLINIC | Age: 62
DRG: 393 | End: 2022-09-27
Payer: COMMERCIAL

## 2022-09-27 LAB
ANION GAP SERPL CALCULATED.3IONS-SCNC: 9 MMOL/L (ref 7–15)
BASOPHILS # BLD AUTO: 0 10E3/UL (ref 0–0.2)
BASOPHILS NFR BLD AUTO: 1 %
BUN SERPL-MCNC: 17.4 MG/DL (ref 8–23)
CALCIUM SERPL-MCNC: 9.2 MG/DL (ref 8.8–10.2)
CHLORIDE SERPL-SCNC: 104 MMOL/L (ref 98–107)
CREAT SERPL-MCNC: 0.66 MG/DL (ref 0.67–1.17)
DEPRECATED HCO3 PLAS-SCNC: 23 MMOL/L (ref 22–29)
EOSINOPHIL # BLD AUTO: 0.1 10E3/UL (ref 0–0.7)
EOSINOPHIL NFR BLD AUTO: 2 %
ERYTHROCYTE [DISTWIDTH] IN BLOOD BY AUTOMATED COUNT: 16.2 % (ref 10–15)
GFR SERPL CREATININE-BSD FRML MDRD: >90 ML/MIN/1.73M2
GLUCOSE SERPL-MCNC: 98 MG/DL (ref 70–99)
HCT VFR BLD AUTO: 31.4 % (ref 40–53)
HGB BLD-MCNC: 9.7 G/DL (ref 13.3–17.7)
IMM GRANULOCYTES # BLD: 0 10E3/UL
IMM GRANULOCYTES NFR BLD: 1 %
INR PPP: 2.17 (ref 0.85–1.15)
LYMPHOCYTES # BLD AUTO: 0.9 10E3/UL (ref 0.8–5.3)
LYMPHOCYTES NFR BLD AUTO: 19 %
MAGNESIUM SERPL-MCNC: 1.6 MG/DL (ref 1.7–2.3)
MCH RBC QN AUTO: 28.8 PG (ref 26.5–33)
MCHC RBC AUTO-ENTMCNC: 30.9 G/DL (ref 31.5–36.5)
MCV RBC AUTO: 93 FL (ref 78–100)
MONOCYTES # BLD AUTO: 0.5 10E3/UL (ref 0–1.3)
MONOCYTES NFR BLD AUTO: 10 %
NEUTROPHILS # BLD AUTO: 3.3 10E3/UL (ref 1.6–8.3)
NEUTROPHILS NFR BLD AUTO: 67 %
NRBC # BLD AUTO: 0 10E3/UL
NRBC BLD AUTO-RTO: 0 /100
PLATELET # BLD AUTO: 190 10E3/UL (ref 150–450)
POTASSIUM SERPL-SCNC: 4.2 MMOL/L (ref 3.4–5.3)
RBC # BLD AUTO: 3.37 10E6/UL (ref 4.4–5.9)
SODIUM SERPL-SCNC: 136 MMOL/L (ref 136–145)
WBC # BLD AUTO: 4.9 10E3/UL (ref 4–11)

## 2022-09-27 PROCEDURE — 85025 COMPLETE CBC W/AUTO DIFF WBC: CPT

## 2022-09-27 PROCEDURE — 83735 ASSAY OF MAGNESIUM: CPT | Performed by: INTERNAL MEDICINE

## 2022-09-27 PROCEDURE — 80048 BASIC METABOLIC PNL TOTAL CA: CPT

## 2022-09-27 PROCEDURE — 250N000013 HC RX MED GY IP 250 OP 250 PS 637: Performed by: INTERNAL MEDICINE

## 2022-09-27 PROCEDURE — 99232 SBSQ HOSP IP/OBS MODERATE 35: CPT | Mod: GC | Performed by: INTERNAL MEDICINE

## 2022-09-27 PROCEDURE — 85610 PROTHROMBIN TIME: CPT

## 2022-09-27 PROCEDURE — 250N000013 HC RX MED GY IP 250 OP 250 PS 637

## 2022-09-27 PROCEDURE — 97110 THERAPEUTIC EXERCISES: CPT | Mod: GP | Performed by: REHABILITATION PRACTITIONER

## 2022-09-27 PROCEDURE — 36415 COLL VENOUS BLD VENIPUNCTURE: CPT

## 2022-09-27 PROCEDURE — 250N000012 HC RX MED GY IP 250 OP 636 PS 637

## 2022-09-27 PROCEDURE — 214N000001 HC R&B CCU UMMC

## 2022-09-27 PROCEDURE — 97530 THERAPEUTIC ACTIVITIES: CPT | Mod: GP | Performed by: REHABILITATION PRACTITIONER

## 2022-09-27 RX ORDER — WARFARIN SODIUM 10 MG/1
10 TABLET ORAL
Status: COMPLETED | OUTPATIENT
Start: 2022-09-27 | End: 2022-09-27

## 2022-09-27 RX ADMIN — HYDROXYCHLOROQUINE SULFATE 200 MG: 200 TABLET, FILM COATED ORAL at 21:05

## 2022-09-27 RX ADMIN — Medication 3 MG: at 21:05

## 2022-09-27 RX ADMIN — HYDRALAZINE HYDROCHLORIDE 25 MG: 25 TABLET, FILM COATED ORAL at 21:05

## 2022-09-27 RX ADMIN — HYDRALAZINE HYDROCHLORIDE 25 MG: 25 TABLET, FILM COATED ORAL at 09:10

## 2022-09-27 RX ADMIN — ATORVASTATIN CALCIUM 40 MG: 40 TABLET, FILM COATED ORAL at 21:05

## 2022-09-27 RX ADMIN — METHYLCELLULOSE 1000 MG: 500 TABLET ORAL at 21:05

## 2022-09-27 RX ADMIN — METHYLPHENIDATE HYDROCHLORIDE 5 MG: 5 TABLET ORAL at 09:11

## 2022-09-27 RX ADMIN — HYDROXYCHLOROQUINE SULFATE 200 MG: 200 TABLET, FILM COATED ORAL at 09:11

## 2022-09-27 RX ADMIN — HYDROXYZINE HYDROCHLORIDE 25 MG: 25 TABLET, FILM COATED ORAL at 21:05

## 2022-09-27 RX ADMIN — MYCOPHENOLIC ACID 180 MG: 180 TABLET, DELAYED RELEASE ORAL at 09:10

## 2022-09-27 RX ADMIN — Medication 1 TABLET: at 13:12

## 2022-09-27 RX ADMIN — DIGOXIN 125 MCG: 125 TABLET ORAL at 09:11

## 2022-09-27 RX ADMIN — QUETIAPINE 25 MG: 25 TABLET ORAL at 09:11

## 2022-09-27 RX ADMIN — TAMSULOSIN HYDROCHLORIDE 0.4 MG: 0.4 CAPSULE ORAL at 09:11

## 2022-09-27 RX ADMIN — LISINOPRIL 10 MG: 10 TABLET ORAL at 09:10

## 2022-09-27 RX ADMIN — SALINE NASAL SPRAY 2 SPRAY: 1.5 SOLUTION NASAL at 21:06

## 2022-09-27 RX ADMIN — METHYLCELLULOSE 1000 MG: 500 TABLET ORAL at 09:11

## 2022-09-27 RX ADMIN — SERTRALINE 75 MG: 25 TABLET, FILM COATED ORAL at 09:11

## 2022-09-27 RX ADMIN — ACETAMINOPHEN 975 MG: 325 TABLET, FILM COATED ORAL at 21:14

## 2022-09-27 RX ADMIN — WARFARIN SODIUM 10 MG: 10 TABLET ORAL at 18:54

## 2022-09-27 RX ADMIN — HYDRALAZINE HYDROCHLORIDE 25 MG: 25 TABLET, FILM COATED ORAL at 16:33

## 2022-09-27 RX ADMIN — ACETAMINOPHEN 975 MG: 325 TABLET, FILM COATED ORAL at 05:04

## 2022-09-27 RX ADMIN — Medication 150 MG: at 21:05

## 2022-09-27 RX ADMIN — QUETIAPINE 25 MG: 25 TABLET ORAL at 13:12

## 2022-09-27 RX ADMIN — METHYLPHENIDATE HYDROCHLORIDE 5 MG: 5 TABLET ORAL at 13:13

## 2022-09-27 RX ADMIN — THIAMINE HCL TAB 100 MG 100 MG: 100 TAB at 09:11

## 2022-09-27 RX ADMIN — MYCOPHENOLIC ACID 180 MG: 180 TABLET, DELAYED RELEASE ORAL at 18:54

## 2022-09-27 ASSESSMENT — ACTIVITIES OF DAILY LIVING (ADL)
ADLS_ACUITY_SCORE: 33

## 2022-09-27 NOTE — PLAN OF CARE
VSS. LVAD stable and without alarms. Sinus tachycardia with BBB on the monitor. Dressing CDI. 1 BM this shift. Complains of shoulder pain controlled with PRN Tylenol. PRN atarax and melatonin given before bed. Plan to continue to monitor patient and notify Cards 2 with changes or concerns.

## 2022-09-27 NOTE — PROGRESS NOTES
D: Called patient for routine virtual VAD Coordinator rounding. No VAD related questions or concerns at this time. Pt reports no VAD alarms for the last few days. He is feeling a little better each day.   I: Discussed POC and provided support and listened to patient and care giver's thoughts and concerns.  P: Continue to follow patient and address any questions or concerns patient and or caregiver may have.

## 2022-09-27 NOTE — PLAN OF CARE
D: 61 year old male with a PMH of SLE, antiphospholipid syndrome, pulmonary embolism (on warfarin), ICM (EF 10-15%) s/p HM3 LVAD (7/28/22), and C. Diff infection (on PO vancomycin taper) who is currently admitted 9/18/2022 for low flow alarms and ongoing profuse diarrhea. Completed course of po vancomycin, encouraging fluids, and trialing stool bulking agents. No low flow alarms since alarm threshold adjusted to 2.     I: Monitored vitals and assessed pt status.   LDA: No IV per orders  PRN: Tylenol x1  Tele: SR w/ BBB  O2: RA  Mobility: SBA    A: A0x4. VSS, MAP WNL. Afebrile. LVAD HM3 no alarms overnight, dressing CDI. Pt reported shoulder/back pain, given PRN Tylenol with some relief. Pt did report slight lightheadedness when getting up this AM to do a standing weight after laying in bed all night, but Pt sat at edge of bed briefly before getting up and it went away. Urinating adequately. No BM this shift. No new skin deficits noted this shift. Appeared to sleep well overnight.      P: Continue to monitor Pt status and report changes to Cards 2.    Shift [id number]

## 2022-09-27 NOTE — PROGRESS NOTES
[facility]    Cardiology Progress Note- Cardiology 2    Faculty Attestation  Colt Heard M.D.    I personally saw and examined this patient with resident, reviewed recent laboratories and imaging studies, discussed the case with the housestaff, and conveyed plan to the patient.  I answered all questions from patient and/or family. I agree with the examination, assessment and plan outlined here.  Await stabilization of GI status (diarrhea)      Date of Admission:  9/18/2022     Assessment & Plan: HVSL   Dandy Sands is a 61 year old male with a PMHx of SLE, antiphospholipid syndrome, pulmonary embolism (on warfarin), ICM (EF 10-15%) s/p HM3 LVAD (7/28/22), and C. Diff infection (on PO vancomycin taper) who is currently admitted 9/18/2022 for low flow alarms and ongoing profuse diarrhea. Completed course of po vancomycin, encouraging fluids, and trialing stool bulking agents. No low flow alarms since alarm threshold adjusted to 2.      Changes today 9/27:   - Monitoring the progress of his diarrhea  - Will reach out to Rheumatology regarding their plan for his discharge medication     #LVAD low flow alarms, resolved  #Diarrhea  #C. Diff infection (positive 9/7/2022), treated  Prior to admit patient was on a 14-day course of 125 mg PO vancomycin qid which was set to go through 9/21/22. Patient denies any missed doses. He states he initially improved with PO vancomycin, but over the past day prior to admission his symptoms have been worsening (3 episodes of non-bloody diarrhea per day, low energy, lack of appetite). CT A/P in the ED negative for toxic megacolon. ID thinks diarrhea is less likely to be from recurrent C. Diff. Trialing stool bulking agents. CTA in August 2022 with normal outflow graft. Waveforms reviewed by LVAD engineers and it is not a device issue. Diarrhea worsened after resumption of MMF, so Rheum was consulted and MMF was switched to  Myfortic. Monitoring for response.   - ID consult- unlikely to have C. Diff recurrence   - GI consult- methylcellulose. At 1000mg bid currently   - Regular diet without fluid restriction  - Calorie counts  - encouraged fluids  - completed 14-day course of po vancomycin 125 mg QID (last day  9/21/2022)  - Discontinued Mg replacement protocol because Mg oxide likely contributing to diarrhea     #Stage D HFrEF 2/2 ICM (EF 10-15%) s/p HM3 LVAD (7/28/22) and CRT-D ('06)  #CAD s/p PCI to LAD ('05)  - LVAD: LVAD speed 5100 (reduced from 5200 9/19/2022)  - Volume status: Mildly hypovolemic to euvolemic (weight on 9/15 discharge 134 lbs, weight this admit 132 lbs)   - GDMT              > BB: Discontinued during previous admission with cardiogenic shock              > ACEi/ARB/ARNi: PTA lisinopril 10 mg daily              > MRA: None               > SGLT2i: None   - SCD ppx: s/p CRT-D (2006)  - AC: Pharmacy to dose warfarin               > on 5 mg daily at home (INR goal 2-3)  - PTA digoxin 125 mcg daily  - PTA atorvastatin 40 mg daily  - Telemetry monitoring     RHC 9/23  RA 10/11/8 mmHg  RV 19/8 mmHg  PA 18/10/13 mmHg  CWP 5/4/4 mmHg  CO (Marley) 3.7 L/min  CI (Marley) 2.2 L/min/m2  CO (TD) 3.27 L/min  CI (TD) 1.94 L/min/m2    #SLE  #APS  #Hx of DVT/PE  On warfarin PTA (INR goal 2-3). Decreased to 5 mg daily during last admission due to mucus membrane bleeding.  - Pharmacy to dose warfarin  - PTA hydroxychloroquine 200mg daily  - Rheum consult for assistance with management of mycophenolate and consideration of med adjustment in the setting of diarrhea   - discontinued PTA mycophenolate 1500mg bid 9/25   - started Myfortic 9/5 (Myfortic 180 mg twice daily X 1 week, increase to 360 mg twice daily X 1 week, then 720 mg daily )   - Follow-up with primary or rheumatology in 2 to 4 weeks to assess medication tolerability   - Follow-up with primary rheumatologist in 8 to 10 weeks      #HTN  - continue hydralazine 25mg TID (pta was on  50mg TID, dose decreased 9/23 because of low flows)  - PTA lisinopril 10 mg daily     #Depression  #MIGUEL  Has had low mood with multiple hospitalizations post LVAD.   - Psychiatry consult, appreciate recs  - Psychology consult, appreciate recs  - PTA seroquel 25 mg QAM, 25 mg at noon, 150 mg QPM  - continue sertraline 75mg daily (pta was on 100mg daily, decreased on 9/23 due to risk of diarrhea)  - continue Ritalin 5mg in the morning and early afternoon per Psychiatry recs- started this admission (started 9/20, can increase to 10mg per dose in a few days if tolerated)  - PTA hydroxyzine prn     #GERD  #Dysphagia  - stopped pta pantoprazole 40mg daily on 9/23 because of risk of diarrhea     #Severe malnutrition in the context of acute on chronic illness  - Nutrition consult  - PT/OT consult  - PTA thiamine 100 mg      #BPH  - PTA tamsulosin 0.4mg daily     #Mild-moderate emphysema  - Not currently using any PTA inhalers        Diet:  Regular diet  DVT Prophylaxis: Warfarin  Fitzgerald Catheter: Not present  Code Status:  Full code          Diet:     DVT Prophylaxis: Warfarin  Fitzgerald Catheter: Not present  Code Status:         Disposition Plan   Expected discharge: 2 - 3 days, recommended to prior living arrangement once diarrhea improvement, reduction in low flow alarms .    The patient's care was discussed with Dr. Colt Merlos MD  [facility]    ______________________________________________________________________    Interval History   Denies any acute events over the night. Denies any chest pain or shortness of breath. No over the night fever or chills. He had two episodes of loose stool since last night. Patient denies any LE edema, exertional dyspnea/orthopnea/PND, dizziness, lightheadedness, nausea, vomiting.      Data reviewed today: I reviewed all medications, new labs and imaging results over the last 24 hours.     Physical Exam   Vital  Signs: Temp: 97.7  F (36.5  C) Temp src: Oral BP: 97/69 Pulse: 92   Resp: 16 SpO2: 97 % O2 Device: None (Room air)    Weight: 132 lbs 0 oz  General: lying in bed, NAD  HEENT: no scleral icterus or conjunctival injection, oropharynx clear  Resp: clear to auscultation bilaterally, no crackles or wheezes  Cardiac: LVAD hum. JVD at lower/mid neck lying at 20 degrees.  Abdomen: soft, non-tender, non-distended. No rebound or guarding. Driveline site with gauze without swelling or tenderness.   Extremities: warm, no lower extremity edema  MSK: cachetic   Neuro: alert, answers questions appropriately  Psych: appropriate mood and affect    Data   Recent Labs   Lab 09/27/22  0540 09/26/22  0613 09/25/22  0727   WBC 4.9 5.3 4.8   HGB 9.7* 10.6* 9.9*   MCV 93 94 94    221 203   INR 2.17* 2.37* 2.28*    137 136   POTASSIUM 4.2 4.2 4.1   CHLORIDE 104 106 106   CO2 23 20* 20*   BUN 17.4 17.5 14.7   CR 0.66* 0.64* 0.61*   ANIONGAP 9 11 10   ELDA 9.2 9.6 9.3   GLC 98 111* 90

## 2022-09-28 ENCOUNTER — APPOINTMENT (OUTPATIENT)
Dept: PHYSICAL THERAPY | Facility: CLINIC | Age: 62
DRG: 393 | End: 2022-09-28
Payer: COMMERCIAL

## 2022-09-28 LAB
ANION GAP SERPL CALCULATED.3IONS-SCNC: 11 MMOL/L (ref 7–15)
BASOPHILS # BLD AUTO: 0 10E3/UL (ref 0–0.2)
BASOPHILS NFR BLD AUTO: 1 %
BUN SERPL-MCNC: 19 MG/DL (ref 8–23)
CALCIUM SERPL-MCNC: 9.2 MG/DL (ref 8.8–10.2)
CHLORIDE SERPL-SCNC: 105 MMOL/L (ref 98–107)
CREAT SERPL-MCNC: 0.69 MG/DL (ref 0.67–1.17)
DEPRECATED HCO3 PLAS-SCNC: 21 MMOL/L (ref 22–29)
EOSINOPHIL # BLD AUTO: 0.1 10E3/UL (ref 0–0.7)
EOSINOPHIL NFR BLD AUTO: 2 %
ERYTHROCYTE [DISTWIDTH] IN BLOOD BY AUTOMATED COUNT: 16 % (ref 10–15)
GFR SERPL CREATININE-BSD FRML MDRD: >90 ML/MIN/1.73M2
GLUCOSE SERPL-MCNC: 98 MG/DL (ref 70–99)
HCT VFR BLD AUTO: 31.9 % (ref 40–53)
HGB BLD-MCNC: 10.1 G/DL (ref 13.3–17.7)
IMM GRANULOCYTES # BLD: 0 10E3/UL
IMM GRANULOCYTES NFR BLD: 0 %
INR PPP: 1.94 (ref 0.85–1.15)
LYMPHOCYTES # BLD AUTO: 1.1 10E3/UL (ref 0.8–5.3)
LYMPHOCYTES NFR BLD AUTO: 21 %
MAGNESIUM SERPL-MCNC: 1.6 MG/DL (ref 1.7–2.3)
MCH RBC QN AUTO: 28.9 PG (ref 26.5–33)
MCHC RBC AUTO-ENTMCNC: 31.7 G/DL (ref 31.5–36.5)
MCV RBC AUTO: 91 FL (ref 78–100)
MONOCYTES # BLD AUTO: 0.6 10E3/UL (ref 0–1.3)
MONOCYTES NFR BLD AUTO: 11 %
NEUTROPHILS # BLD AUTO: 3.4 10E3/UL (ref 1.6–8.3)
NEUTROPHILS NFR BLD AUTO: 65 %
NRBC # BLD AUTO: 0 10E3/UL
NRBC BLD AUTO-RTO: 0 /100
PLATELET # BLD AUTO: 231 10E3/UL (ref 150–450)
POTASSIUM SERPL-SCNC: 4.2 MMOL/L (ref 3.4–5.3)
RBC # BLD AUTO: 3.49 10E6/UL (ref 4.4–5.9)
SODIUM SERPL-SCNC: 137 MMOL/L (ref 136–145)
WBC # BLD AUTO: 5.2 10E3/UL (ref 4–11)

## 2022-09-28 PROCEDURE — 250N000013 HC RX MED GY IP 250 OP 250 PS 637: Performed by: INTERNAL MEDICINE

## 2022-09-28 PROCEDURE — 214N000001 HC R&B CCU UMMC

## 2022-09-28 PROCEDURE — 250N000013 HC RX MED GY IP 250 OP 250 PS 637

## 2022-09-28 PROCEDURE — 85025 COMPLETE CBC W/AUTO DIFF WBC: CPT

## 2022-09-28 PROCEDURE — 97110 THERAPEUTIC EXERCISES: CPT | Mod: GP

## 2022-09-28 PROCEDURE — 85610 PROTHROMBIN TIME: CPT

## 2022-09-28 PROCEDURE — 83735 ASSAY OF MAGNESIUM: CPT

## 2022-09-28 PROCEDURE — 80048 BASIC METABOLIC PNL TOTAL CA: CPT

## 2022-09-28 PROCEDURE — 250N000012 HC RX MED GY IP 250 OP 636 PS 637

## 2022-09-28 PROCEDURE — 99232 SBSQ HOSP IP/OBS MODERATE 35: CPT | Mod: GC | Performed by: INTERNAL MEDICINE

## 2022-09-28 PROCEDURE — 97116 GAIT TRAINING THERAPY: CPT | Mod: GP

## 2022-09-28 PROCEDURE — 36415 COLL VENOUS BLD VENIPUNCTURE: CPT

## 2022-09-28 RX ORDER — WARFARIN SODIUM 10 MG/1
10 TABLET ORAL
Status: COMPLETED | OUTPATIENT
Start: 2022-09-28 | End: 2022-09-28

## 2022-09-28 RX ORDER — MAGNESIUM SULFATE HEPTAHYDRATE 40 MG/ML
4 INJECTION, SOLUTION INTRAVENOUS ONCE
Status: DISCONTINUED | OUTPATIENT
Start: 2022-09-28 | End: 2022-09-29

## 2022-09-28 RX ADMIN — SALINE NASAL SPRAY 2 SPRAY: 1.5 SOLUTION NASAL at 21:02

## 2022-09-28 RX ADMIN — DIGOXIN 125 MCG: 125 TABLET ORAL at 09:24

## 2022-09-28 RX ADMIN — Medication 1 TABLET: at 13:43

## 2022-09-28 RX ADMIN — METHYLPHENIDATE HYDROCHLORIDE 5 MG: 5 TABLET ORAL at 09:22

## 2022-09-28 RX ADMIN — MYCOPHENOLIC ACID 180 MG: 180 TABLET, DELAYED RELEASE ORAL at 09:24

## 2022-09-28 RX ADMIN — WARFARIN SODIUM 10 MG: 10 TABLET ORAL at 17:26

## 2022-09-28 RX ADMIN — SALINE NASAL SPRAY 2 SPRAY: 1.5 SOLUTION NASAL at 09:25

## 2022-09-28 RX ADMIN — QUETIAPINE 25 MG: 25 TABLET ORAL at 13:43

## 2022-09-28 RX ADMIN — MYCOPHENOLIC ACID 180 MG: 180 TABLET, DELAYED RELEASE ORAL at 17:26

## 2022-09-28 RX ADMIN — HYDRALAZINE HYDROCHLORIDE 25 MG: 25 TABLET, FILM COATED ORAL at 21:00

## 2022-09-28 RX ADMIN — HYDROXYCHLOROQUINE SULFATE 200 MG: 200 TABLET, FILM COATED ORAL at 09:22

## 2022-09-28 RX ADMIN — Medication 150 MG: at 20:59

## 2022-09-28 RX ADMIN — Medication 3 MG: at 20:59

## 2022-09-28 RX ADMIN — SERTRALINE 75 MG: 25 TABLET, FILM COATED ORAL at 09:23

## 2022-09-28 RX ADMIN — LISINOPRIL 10 MG: 10 TABLET ORAL at 09:24

## 2022-09-28 RX ADMIN — HYDRALAZINE HYDROCHLORIDE 25 MG: 25 TABLET, FILM COATED ORAL at 09:24

## 2022-09-28 RX ADMIN — THIAMINE HCL TAB 100 MG 100 MG: 100 TAB at 09:24

## 2022-09-28 RX ADMIN — METHYLPHENIDATE HYDROCHLORIDE 5 MG: 5 TABLET ORAL at 13:43

## 2022-09-28 RX ADMIN — HYDROXYZINE HYDROCHLORIDE 25 MG: 25 TABLET, FILM COATED ORAL at 21:00

## 2022-09-28 RX ADMIN — QUETIAPINE 25 MG: 25 TABLET ORAL at 09:23

## 2022-09-28 RX ADMIN — TAMSULOSIN HYDROCHLORIDE 0.4 MG: 0.4 CAPSULE ORAL at 09:24

## 2022-09-28 RX ADMIN — SALINE NASAL SPRAY 2 SPRAY: 1.5 SOLUTION NASAL at 13:42

## 2022-09-28 RX ADMIN — METHYLCELLULOSE 1000 MG: 500 TABLET ORAL at 20:58

## 2022-09-28 RX ADMIN — HYDROXYCHLOROQUINE SULFATE 200 MG: 200 TABLET, FILM COATED ORAL at 21:00

## 2022-09-28 RX ADMIN — SALINE NASAL SPRAY 2 SPRAY: 1.5 SOLUTION NASAL at 17:26

## 2022-09-28 RX ADMIN — ATORVASTATIN CALCIUM 40 MG: 40 TABLET, FILM COATED ORAL at 21:00

## 2022-09-28 RX ADMIN — METHYLCELLULOSE 1000 MG: 500 TABLET ORAL at 09:22

## 2022-09-28 ASSESSMENT — ACTIVITIES OF DAILY LIVING (ADL)
ADLS_ACUITY_SCORE: 33
ADLS_ACUITY_SCORE: 35
ADLS_ACUITY_SCORE: 33
ADLS_ACUITY_SCORE: 33
ADLS_ACUITY_SCORE: 35
ADLS_ACUITY_SCORE: 35
ADLS_ACUITY_SCORE: 31
ADLS_ACUITY_SCORE: 29
ADLS_ACUITY_SCORE: 33
ADLS_ACUITY_SCORE: 29

## 2022-09-28 NOTE — PROGRESS NOTES
Care Management Follow Up    Length of Stay (days): 10    Expected Discharge Date: 09/29/2022     Concerns to be Addressed: Discharge planning   Patient plan of care discussed at interdisciplinary rounds: Yes    Anticipated Discharge Disposition: Home      Anticipated Discharge Services: Home Care- RN (resumption)  Anticipated Discharge DME:  None    Additional Information:  Per MD, pt may be able to discharge home tomorrow. This writer updated [facility] liason. They are tentatively planning for a Friday visit with INR draw.     [facility] (nursing)  Phone: [phone]    CC will continue to monitor patient's medical condition and progress towards discharge.  Taylor Schmitz RN BSN  6C Unit Care Coordinator  Phone number: [phone]  Pager: [phone]

## 2022-09-28 NOTE — PROGRESS NOTES
[facility]    Faculty Attestation  Colt Heard M.D.    I personally saw and examined this patient, reviewed recent laboratories and imaging studies, discussed the case with the housestaff, and conveyed plan to the patient.  I answered all questions from patient and/or family. I agree with the examination, assessment and plan outlined here.  I saw patient with resident.  Awaiting stabilization or improvement in GI symptoms.         Cardiology Progress Note- Cardiology 2        Date of Admission:  9/18/2022     Assessment & Plan: HVSL   Dandy EDUARD Sands is a 61 year old male with a PMHx of SLE, antiphospholipid syndrome, pulmonary embolism (on warfarin), ICM (EF 10-15%) s/p HM3 LVAD (7/28/22), and C. Diff infection (on PO vancomycin taper) who is currently admitted 9/18/2022 for low flow alarms and ongoing profuse diarrhea. Completed course of po vancomycin, encouraging fluids, and trialing stool bulking agents. No low flow alarms since alarm threshold adjusted to 2.      Changes today 9/28:   - Monitor for improvement in diarrhea  - 4g IV Mg ordered (if can achieve IV access; otherwise would not proceed with orals for now given risk of diarrhea)     #LVAD low flow alarms, resolved  #Diarrhea, improving  #C. Diff infection (positive 9/7/2022), treated  Prior to admit patient was on a 14-day course of 125 mg PO vancomycin qid which was set to go through 9/21/22. Patient denies any missed doses. He states he initially improved with PO vancomycin, but over the past day prior to admission his symptoms have been worsening (3 episodes of non-bloody diarrhea per day, low energy, lack of appetite). CT A/P in the ED negative for toxic megacolon. ID thinks diarrhea is less likely to be from recurrent C. Diff. Trialing stool bulking agents. CTA in August 2022 with normal outflow graft. Waveforms reviewed by LVAD engineers and it is not a device issue. Diarrhea worsened  after resumption of MMF, so Rheum was consulted and MMF was switched to Myfortic. Monitoring for response. Still having diarrhea, but more formed.  - ID consult- unlikely to have C. Diff recurrence   - GI consult- methylcellulose. At 1000mg bid currently   - Regular diet without fluid restriction  - Calorie counts  - encouraged fluids  - completed 14-day course of po vancomycin 125 mg QID (last day  9/21/2022)  - Discontinued Mg replacement protocol because Mg oxide likely contributing to diarrhea     #Stage D HFrEF 2/2 ICM (EF 10-15%) s/p HM3 LVAD (7/28/22) and CRT-D ('06)  #CAD s/p PCI to LAD ('05)  - LVAD: LVAD speed 5100 (reduced from 5200 9/19/2022)  - Volume status: Mildly hypovolemic to euvolemic (weight on 9/15 discharge 134 lbs, weight this admit 132 lbs)   - GDMT              > BB: Discontinued during previous admission with cardiogenic shock              > ACEi/ARB/ARNi: PTA lisinopril 10 mg daily              > MRA: None               > SGLT2i: None   - SCD ppx: s/p CRT-D (2006)  - AC: Pharmacy to dose warfarin               > on 5 mg daily at home (INR goal 2-3)  - PTA digoxin 125 mcg daily  - PTA atorvastatin 40 mg daily  - Telemetry monitoring     RHC 9/23  RA 10/11/8 mmHg  RV 19/8 mmHg  PA 18/10/13 mmHg  CWP 5/4/4 mmHg  CO (Marley) 3.7 L/min  CI (Marley) 2.2 L/min/m2  CO (TD) 3.27 L/min  CI (TD) 1.94 L/min/m2    #SLE  #APS  #Hx of DVT/PE  On warfarin PTA (INR goal 2-3). Decreased to 5 mg daily during last admission due to mucus membrane bleeding.  - Pharmacy to dose warfarin  - PTA hydroxychloroquine 200mg daily  - Rheum consult for assistance with management of mycophenolate and consideration of med adjustment in the setting of diarrhea   - discontinued PTA mycophenolate 1500mg bid 9/25   - started Myfortic 9/25 (Myfortic 180 mg twice daily X 1 week, increase to 360 mg twice daily X 1 week, then 720 mg daily )   - Follow-up with primary or rheumatology in 2 to 4 weeks to assess medication  tolerability   - Follow-up with primary rheumatologist in 8 to 10 weeks      #HTN  - continue hydralazine 25mg TID (pta was on 50mg TID, dose decreased 9/23 because of low flows)  - PTA lisinopril 10 mg daily     #Depression  #MIGUEL  Has had low mood with multiple hospitalizations post LVAD.   - Psychiatry consult, appreciate recs  - Psychology consult, appreciate recs  - PTA seroquel 25 mg QAM, 25 mg at noon, 150 mg QPM  - continue sertraline 75mg daily (pta was on 100mg daily, decreased on 9/23 due to risk of diarrhea)  - continue Ritalin 5mg in the morning and early afternoon per Psychiatry recs- started this admission (started 9/20, can increase to 10mg per dose in a few days if tolerated)  - PTA hydroxyzine prn     #GERD  #Dysphagia  - stopped pta pantoprazole 40mg daily on 9/23 because of risk of diarrhea     #Severe malnutrition in the context of acute on chronic illness  - Nutrition consult  - PT/OT consult  - PTA thiamine 100 mg      #BPH  - PTA tamsulosin 0.4mg daily     #Mild-moderate emphysema  - Not currently using any PTA inhalers        Diet:  Regular diet  DVT Prophylaxis: Warfarin  Fitzgerald Catheter: Not present  Code Status:  Full code              Disposition Plan   Expected discharge: Tomorrow, recommended to prior living arrangement once diarrhea improvement, reduction in low flow alarms .    The patient's care was discussed with Dr. Colt Can MD  [facility]    ______________________________________________________________________    Interval History   NAEON. Nursing notes reviewed. Reports 3 BMs over the last 24 hours that are like pudding, no incontinence. Stools a little more formed from before. No new symptoms. No fevers, chills chest pain, shortness of breath, abdominal pain, nausea, vomiting. Was able to walk the halls yesterday and it went well. Has been drinking fluids.      Data reviewed today: I reviewed all medications,  new labs and imaging results over the last 24 hours.     Physical Exam   Vital Signs: Temp: 97.4  F (36.3  C) Temp src: Oral BP: 95/83 Pulse: 87   Resp: 18 SpO2: 98 % O2 Device: None (Room air)    Weight: 131 lbs 14.4 oz  General: lying in bed, NAD  HEENT: no scleral icterus or conjunctival injection, oropharynx clear  Resp: clear to auscultation bilaterally, no crackles or wheezes  Cardiac: LVAD hum. JVD at lower/mid neck lying at 20 degrees.  Abdomen: soft, non-tender, non-distended. No rebound or guarding. Driveline site with gauze without swelling or tenderness.   Extremities: warm, no lower extremity edema  MSK: cachetic   Neuro: alert, answers questions appropriately  Psych: appropriate mood and affect    Data   Recent Labs   Lab 09/28/22  0532 09/27/22  0540 09/26/22  0613   WBC 5.2 4.9 5.3   HGB 10.1* 9.7* 10.6*   MCV 91 93 94    190 221   INR 1.94* 2.17* 2.37*    136 137   POTASSIUM 4.2 4.2 4.2   CHLORIDE 105 104 106   CO2 21* 23 20*   BUN 19.0 17.4 17.5   CR 0.69 0.66* 0.64*   ANIONGAP 11 9 11   ELDA 9.2 9.2 9.6   GLC 98 98 111*

## 2022-09-28 NOTE — PLAN OF CARE
Goal Outcome Evaluation:    SR, HM 3 numbers WNL. VSS. RA. BM x1 this shift per patient. Tolerating diet, voiding via urinal. Denies pain. Son at bedside. Plan to discharge to [facility] per pt. Continue to monitor and notify team of changes.

## 2022-09-28 NOTE — PLAN OF CARE
Goal Outcome Evaluation:     HX:61M with HM3 LVAD for NICM 7/28/22 who was recently diagnosed with a c.diff infection and discharged on 9/15/22 with oral vancomycin treatment here with 2 low flow alarms, decreased oral intake, and on going diarrhea. He is hypovolemic on exam. LVAD sounds present. Low flows are likely due to hypovolemia.    Cardiac:SR, 1degree AV block, BBB block. Has PPM but unknown if paced as function turned off. VAD values within patient norms. No alarms this shift. White wall cord difficult to connect to patient. Now on battery. VAD coordinator notified.   VS:VSS, MAP 86 and 57.   IV:No IV, MDs aware. Order received for 4 mg IV magnesium. Patient refused IV as he had had multiple that have been difficult to place and don't last long. MD aware that magnesium may not be given.   Tubes:VAD insertion line.  Neuro:A/Ox4, doesn't fel the need to call when getting up. Reminded patient to have someone I the room when he get up. Does not use his walker.   Resp:Denies shortness of breath, on RA.   GI/: Firmer stools per patient, voiding independently. No incontinence.   Nutrition:Regular diet. Good PO, increasing liquid intake.   Endo:NA  Skin:Intact, other than VAD driveline site.   Activity:Up to bathroom with minimal assist. Waiting for therapy to see him.   Pain:C/O right shoulder/back pain. Declined medication.  Social:Son and girlfriend here to visit. Has cell phone at bedside.  Plan: Probable discharge tomorrow hopefully with diarrhea resolved. Continue current cares and notify providers with questions or concerns.

## 2022-09-28 NOTE — PLAN OF CARE
D: Patient admitted 9/18 for low flow alarms and ongoing diarrhea. Hx. SLE, antiphospholipid syndrome, pulmonary embolism (on warfarin), ICM (EF 10-15%) s/p HM3 LVAD (7/28/22), and C. Diff infection.   I/A: A&Ox4. VSSA on RA. SR/ST 80s-100s w/occ PVCs. LVAD #s wnl, no alarms. C/o R shoulder joint aching partially relieved by prn tylenol. Adequate uop. No loose stools last PM or overnight. SBA w/walker. PRN atarax and melatonin at HS. Appeared to rest comfortably overnight.   P: Continue to monitor and notify Cards 2 with questions/concerns.

## 2022-09-29 ENCOUNTER — TELEPHONE (OUTPATIENT)
Dept: OTOLARYNGOLOGY | Facility: CLINIC | Age: 62
End: 2022-09-29

## 2022-09-29 ENCOUNTER — TELEPHONE (OUTPATIENT)
Dept: ANTICOAGULATION | Facility: CLINIC | Age: 62
End: 2022-09-29

## 2022-09-29 VITALS
WEIGHT: 134.04 LBS | BODY MASS INDEX: 21.04 KG/M2 | RESPIRATION RATE: 16 BRPM | HEIGHT: 67 IN | OXYGEN SATURATION: 100 % | DIASTOLIC BLOOD PRESSURE: 75 MMHG | SYSTOLIC BLOOD PRESSURE: 87 MMHG | HEART RATE: 90 BPM | TEMPERATURE: 98 F

## 2022-09-29 DIAGNOSIS — I50.22 CHRONIC SYSTOLIC CONGESTIVE HEART FAILURE (H): Primary | ICD-10-CM

## 2022-09-29 DIAGNOSIS — Z95.811 LVAD (LEFT VENTRICULAR ASSIST DEVICE) PRESENT (H): ICD-10-CM

## 2022-09-29 DIAGNOSIS — I50.20 HEART FAILURE WITH REDUCED EJECTION FRACTION, NYHA CLASS III (H): ICD-10-CM

## 2022-09-29 LAB
ANION GAP SERPL CALCULATED.3IONS-SCNC: 13 MMOL/L (ref 7–15)
BASOPHILS # BLD AUTO: 0 10E3/UL (ref 0–0.2)
BASOPHILS NFR BLD AUTO: 1 %
BUN SERPL-MCNC: 19.2 MG/DL (ref 8–23)
CALCIUM SERPL-MCNC: 9.2 MG/DL (ref 8.8–10.2)
CHLORIDE SERPL-SCNC: 104 MMOL/L (ref 98–107)
CREAT SERPL-MCNC: 0.66 MG/DL (ref 0.67–1.17)
DEPRECATED HCO3 PLAS-SCNC: 20 MMOL/L (ref 22–29)
EOSINOPHIL # BLD AUTO: 0.1 10E3/UL (ref 0–0.7)
EOSINOPHIL NFR BLD AUTO: 2 %
ERYTHROCYTE [DISTWIDTH] IN BLOOD BY AUTOMATED COUNT: 16.1 % (ref 10–15)
GFR SERPL CREATININE-BSD FRML MDRD: >90 ML/MIN/1.73M2
GLUCOSE SERPL-MCNC: 97 MG/DL (ref 70–99)
HCT VFR BLD AUTO: 32.6 % (ref 40–53)
HGB BLD-MCNC: 10.4 G/DL (ref 13.3–17.7)
IMM GRANULOCYTES # BLD: 0 10E3/UL
IMM GRANULOCYTES NFR BLD: 0 %
INR PPP: 2.12 (ref 0.85–1.15)
LYMPHOCYTES # BLD AUTO: 1 10E3/UL (ref 0.8–5.3)
LYMPHOCYTES NFR BLD AUTO: 16 %
MAGNESIUM SERPL-MCNC: 1.5 MG/DL (ref 1.7–2.3)
MCH RBC QN AUTO: 29.5 PG (ref 26.5–33)
MCHC RBC AUTO-ENTMCNC: 31.9 G/DL (ref 31.5–36.5)
MCV RBC AUTO: 93 FL (ref 78–100)
MONOCYTES # BLD AUTO: 0.7 10E3/UL (ref 0–1.3)
MONOCYTES NFR BLD AUTO: 11 %
NEUTROPHILS # BLD AUTO: 4.2 10E3/UL (ref 1.6–8.3)
NEUTROPHILS NFR BLD AUTO: 70 %
NRBC # BLD AUTO: 0 10E3/UL
NRBC BLD AUTO-RTO: 0 /100
PLATELET # BLD AUTO: 209 10E3/UL (ref 150–450)
POTASSIUM SERPL-SCNC: 4 MMOL/L (ref 3.4–5.3)
RBC # BLD AUTO: 3.52 10E6/UL (ref 4.4–5.9)
SODIUM SERPL-SCNC: 137 MMOL/L (ref 136–145)
WBC # BLD AUTO: 6 10E3/UL (ref 4–11)

## 2022-09-29 PROCEDURE — 093K7ZZ CONTROL BLEEDING IN NASAL MUCOSA AND SOFT TISSUE, VIA NATURAL OR ARTIFICIAL OPENING: ICD-10-PCS | Performed by: PHYSICIAN ASSISTANT

## 2022-09-29 PROCEDURE — 250N000012 HC RX MED GY IP 250 OP 636 PS 637

## 2022-09-29 PROCEDURE — 250N000009 HC RX 250: Performed by: PHYSICIAN ASSISTANT

## 2022-09-29 PROCEDURE — 250N000013 HC RX MED GY IP 250 OP 250 PS 637

## 2022-09-29 PROCEDURE — 99207 PR NO CHARGE CURBSIDE PS: CPT | Performed by: INTERNAL MEDICINE

## 2022-09-29 PROCEDURE — 85610 PROTHROMBIN TIME: CPT

## 2022-09-29 PROCEDURE — 250N000011 HC RX IP 250 OP 636

## 2022-09-29 PROCEDURE — 999N000128 HC STATISTIC PERIPHERAL IV START W/O US GUIDANCE

## 2022-09-29 PROCEDURE — 80048 BASIC METABOLIC PNL TOTAL CA: CPT

## 2022-09-29 PROCEDURE — 36415 COLL VENOUS BLD VENIPUNCTURE: CPT

## 2022-09-29 PROCEDURE — 99222 1ST HOSP IP/OBS MODERATE 55: CPT | Mod: 25 | Performed by: PHYSICIAN ASSISTANT

## 2022-09-29 PROCEDURE — 83735 ASSAY OF MAGNESIUM: CPT

## 2022-09-29 PROCEDURE — 85025 COMPLETE CBC W/AUTO DIFF WBC: CPT

## 2022-09-29 PROCEDURE — 250N000013 HC RX MED GY IP 250 OP 250 PS 637: Performed by: INTERNAL MEDICINE

## 2022-09-29 PROCEDURE — 2Y41X5Z PACKING OF NASAL REGION USING PACKING MATERIAL: ICD-10-PCS | Performed by: PHYSICIAN ASSISTANT

## 2022-09-29 PROCEDURE — 30901 CONTROL OF NOSEBLEED: CPT | Mod: LT | Performed by: PHYSICIAN ASSISTANT

## 2022-09-29 PROCEDURE — 99239 HOSP IP/OBS DSCHRG MGMT >30: CPT | Mod: GC | Performed by: INTERNAL MEDICINE

## 2022-09-29 RX ORDER — MYCOPHENOLIC ACID 360 MG/1
360 TABLET, DELAYED RELEASE ORAL 2 TIMES DAILY
Qty: 14 TABLET | Refills: 0 | Status: SHIPPED | OUTPATIENT
Start: 2022-09-29 | End: 2022-09-29

## 2022-09-29 RX ORDER — HYDRALAZINE HYDROCHLORIDE 25 MG/1
25 TABLET, FILM COATED ORAL 3 TIMES DAILY
Qty: 90 TABLET | Refills: 0 | Status: SHIPPED | OUTPATIENT
Start: 2022-09-29 | End: 2022-11-18

## 2022-09-29 RX ORDER — SERTRALINE HYDROCHLORIDE 25 MG/1
75 TABLET, FILM COATED ORAL DAILY
Qty: 90 TABLET | Refills: 0 | Status: SHIPPED | OUTPATIENT
Start: 2022-09-30 | End: 2022-10-07

## 2022-09-29 RX ORDER — ACETAMINOPHEN 325 MG/1
975 TABLET ORAL EVERY 8 HOURS PRN
Qty: 270 TABLET | Refills: 0 | Status: ON HOLD | OUTPATIENT
Start: 2022-09-29 | End: 2024-06-13

## 2022-09-29 RX ORDER — MYCOPHENOLIC ACID 180 MG/1
180 TABLET, DELAYED RELEASE ORAL 2 TIMES DAILY
Qty: 149 TABLET | Refills: 0 | Status: ON HOLD | OUTPATIENT
Start: 2022-09-29 | End: 2022-10-17

## 2022-09-29 RX ORDER — MAGNESIUM SULFATE HEPTAHYDRATE 40 MG/ML
4 INJECTION, SOLUTION INTRAVENOUS ONCE
Status: COMPLETED | OUTPATIENT
Start: 2022-09-29 | End: 2022-09-29

## 2022-09-29 RX ORDER — MYCOPHENOLIC ACID 360 MG/1
720 TABLET, DELAYED RELEASE ORAL DAILY
Qty: 60 TABLET | Refills: 0 | Status: SHIPPED | OUTPATIENT
Start: 2022-10-10 | End: 2022-09-29

## 2022-09-29 RX ORDER — WARFARIN SODIUM 10 MG/1
10 TABLET ORAL
Status: DISCONTINUED | OUTPATIENT
Start: 2022-09-29 | End: 2022-09-29 | Stop reason: HOSPADM

## 2022-09-29 RX ORDER — OXYMETAZOLINE HYDROCHLORIDE 0.05 G/100ML
2 SPRAY NASAL 2 TIMES DAILY
Status: DISCONTINUED | OUTPATIENT
Start: 2022-09-29 | End: 2022-09-29 | Stop reason: HOSPADM

## 2022-09-29 RX ORDER — METHYLPHENIDATE HYDROCHLORIDE 5 MG/1
5 TABLET ORAL 2 TIMES DAILY
Qty: 60 TABLET | Refills: 0 | Status: CANCELLED | OUTPATIENT
Start: 2022-09-29

## 2022-09-29 RX ORDER — MYCOPHENOLIC ACID 180 MG/1
180 TABLET, DELAYED RELEASE ORAL 2 TIMES DAILY
Qty: 7 TABLET | Refills: 0 | Status: SHIPPED | OUTPATIENT
Start: 2022-09-29 | End: 2022-09-29

## 2022-09-29 RX ADMIN — SERTRALINE 75 MG: 25 TABLET, FILM COATED ORAL at 08:37

## 2022-09-29 RX ADMIN — HYDRALAZINE HYDROCHLORIDE 25 MG: 25 TABLET, FILM COATED ORAL at 08:37

## 2022-09-29 RX ADMIN — METHYLPHENIDATE HYDROCHLORIDE 5 MG: 5 TABLET ORAL at 08:42

## 2022-09-29 RX ADMIN — METHYLPHENIDATE HYDROCHLORIDE 5 MG: 5 TABLET ORAL at 12:16

## 2022-09-29 RX ADMIN — ACETAMINOPHEN 975 MG: 325 TABLET, FILM COATED ORAL at 14:14

## 2022-09-29 RX ADMIN — THIAMINE HCL TAB 100 MG 100 MG: 100 TAB at 08:39

## 2022-09-29 RX ADMIN — METHYLCELLULOSE 1000 MG: 500 TABLET ORAL at 08:37

## 2022-09-29 RX ADMIN — MAGNESIUM SULFATE HEPTAHYDRATE 4 G: 40 INJECTION, SOLUTION INTRAVENOUS at 10:21

## 2022-09-29 RX ADMIN — MYCOPHENOLIC ACID 180 MG: 180 TABLET, DELAYED RELEASE ORAL at 08:42

## 2022-09-29 RX ADMIN — DIGOXIN 125 MCG: 125 TABLET ORAL at 08:37

## 2022-09-29 RX ADMIN — Medication 1 TABLET: at 12:16

## 2022-09-29 RX ADMIN — QUETIAPINE 25 MG: 25 TABLET ORAL at 12:17

## 2022-09-29 RX ADMIN — OXYMETAZOLINE HYDROCHLORIDE 2 SPRAY: 0.05 SPRAY NASAL at 10:44

## 2022-09-29 RX ADMIN — QUETIAPINE 25 MG: 25 TABLET ORAL at 08:38

## 2022-09-29 RX ADMIN — LISINOPRIL 10 MG: 10 TABLET ORAL at 08:38

## 2022-09-29 RX ADMIN — HYDRALAZINE HYDROCHLORIDE 25 MG: 25 TABLET, FILM COATED ORAL at 14:14

## 2022-09-29 RX ADMIN — HYDROXYCHLOROQUINE SULFATE 200 MG: 200 TABLET, FILM COATED ORAL at 08:38

## 2022-09-29 RX ADMIN — TAMSULOSIN HYDROCHLORIDE 0.4 MG: 0.4 CAPSULE ORAL at 08:37

## 2022-09-29 ASSESSMENT — ACTIVITIES OF DAILY LIVING (ADL)
ADLS_ACUITY_SCORE: 34

## 2022-09-29 NOTE — PLAN OF CARE
DISCHARGE                         No discharge date for patient encounter.  ----------------------------------------------------------------------------  Discharged to: Home  Via: Automobile  Accompanied by: Family  Discharge Instructions: diet, activity, medications, follow up appointments, when to call the MD, aftercare instructions, and what to watchout for (i.e. s/s of infection, increasing SOB, palpitations, chest pain,)  Prescriptions: To be filled by    discharge   pharmacy per pt's request; medication list reviewed & sent with pt  Follow Up Appointments: arranged; information given  Belongings: All sent with pt  IV: out  Telemetry: off  Pt exhibits understanding of above discharge instructions; all questions answered.    Discharge Paperwork: Signed, copied, and sent home with patient.

## 2022-09-29 NOTE — PLAN OF CARE
Dx: admitted 9/8 for low flow alarm and diarrhea     Neuro: A&O x4. Reported minimal sleep; Melatonin given  Cardiac: SR 1  AVB/BBB. Map in 50s at rest; 87 with activity. LVAD numbers WNL. No low flow alarm this shift.   Respiratory: On RA. LS CTAB.  GI/: BM+. Voiding with good UOP.  Diet: Reg  Skin: No new skin deficit noted.    Pain: denied.  LDAs: none  Electrolytes: Await AM labs  Mobility: Has not gotten out of bed this shift.     Plan: anticipates discharge this AM    Goal Outcome Evaluation:    Plan of Care Reviewed With: patient     Overall Patient Progress: improving    Outcome Evaluation: Weight up. Pt reported formed stools and expressed readiness for discharge

## 2022-09-29 NOTE — DISCHARGE SUMMARY
[facility]    Cardiology Discharge Summary- Cardiology 2    I personally formulated discharge plan with resident and conveyed to patient.  Planning included VAD management, rehabilitation, GI issues, anticoagulation, follow-up as outpatient.  45 minutes         Date of Admission:  9/18/2022  Date of Discharge:  9/29/2022  Discharging Provider: Colt Heard MD      Discharge Diagnoses   #Diarrhea, improved  #C. Diff infection (positive 9/7/2022), treated  #LVAD low flow alarms, resolved  #Stage D HFrEF 2/2 ICM (EF 10-15%) s/p HM3 LVAD (7/28/22) and CRT-D ('06)  #CAD s/p PCI to LAD ('05)  #Epistaxis, resolved  #Hypomagnesemia  #SLE  #APS  #Hx of DVT/PE  #HTN  #Depression  #MIGUEL  #GERD  #Dysphagia  #Severe malnutrition in the context of acute on chronic illness  #BPH  #Mild-moderate emphysema    Follow-ups Needed After Discharge   Follow-up Appointments     Adult [facility] Follow-up and recommended labs and tests      - ENT will help in scheduling follow-up for nose bleeds ([facility])  - You have a cardiology appt with Dr. Lora on 10/7/2022. You can have   labs rechecked at that time ([facility])  - Follow-up with primary or rheumatology in 2 to 4 weeks to assess   medication tolerability (since we started you on Myfortic) ([place])  - Follow-up with primary rheumatologist in 8 to 10 weeks ([place])    Appointments on [place] and/or [facility] (with [facility] or [facility]   provider or service). Call [phone] if you haven't heard regarding   these appointments within 7 days of discharge.           Unresulted Labs Ordered in the Past 30 Days of this Admission     Date and Time Order Name Status Description    8/7/2022  9:15 AM Prepare red blood cells (unit) Preliminary     8/7/2022  9:15 AM Prepare red blood cells (unit) Preliminary     8/7/2022  2:45 AM Prepare plasma (unit) Preliminary     8/7/2022  2:45 AM Prepare plasma (unit) Preliminary     7/30/2022   1:30 PM Prepare red blood cells (unit) Preliminary     7/13/2022  1:21 AM CONDITIONAL Prepare pheresed platelets (unit) Preliminary     7/12/2022  9:16 PM Prepare red blood cells (unit) Preliminary     7/12/2022  5:30 PM Prepare red blood cells (unit) Preliminary     7/12/2022  5:30 PM Prepare red blood cells (unit) Preliminary     6/20/2022  2:25 PM Prepare red blood cells (unit) Preliminary     6/20/2022  2:25 PM Prepare red blood cells (unit) Preliminary       These results will be followed up by n/a    Discharge Disposition   Discharged to home  Condition at discharge: Stable    Hospital Course   Dandy Sands is a 61 year old male with a PMHx of SLE, antiphospholipid syndrome, pulmonary embolism (on warfarin), ICM (EF 10-15%) s/p HM3 LVAD (7/28/22), and C. Diff infection who was admitted on 9/18/2022 for low flow alarms and ongoing profuse diarrhea. Completed course of po vancomycin, encouraged fluids, adjusted medications, and trialed stool bulking agents. No low flow alarms since alarm threshold adjusted to 2. Diarrhea more formed and no incontinence at time of discharge. On the day of discharge had epistaxis which was packed by ENT.     #Diarrhea, improved  #C. Diff infection (positive 9/7/2022), treated  Prior to admit patient was on a 14-day course of 125 mg PO vancomycin qid which was set to go through 9/21/22. Patient denied any missed doses. He states he initially improved with PO vancomycin, but over the past day prior to admission his symptoms had been worsening (3 episodes of non-bloody diarrhea per day, low energy, lack of appetite). CT A/P in the ED negative for toxic megacolon. ID was consulted and ID thought diarrhea is less likely to be from recurrent C. Diff. GI was consulted, and stool bulking agents were trialed. Diarrhea worsened after resumption of MMF, so Rheum was consulted and MMF was switched to Myfortic. Other medication changes to reduce diarrhea included decreasing dose of sertraline  and discontinuing pantoprazole. Diarrhea improved upon discharge (more formed without incontinence).   - start methylcellulose 1000mg bid for stool bulking  - completed 14-day course of po vancomycin 125 mg QID (last day  9/21/2022)  - medication adjustments as below.     #LVAD low flow alarms, resolved  CTA in August 2022 with normal outflow graft. Waveforms reviewed by LVAD engineers and it is not a device issue. Thought to be from dehydration from diarrhea as above. Continued to have low flow alarms with getting up and asymptomatic despite adequate fluid intake and improvement in diarrhea. No low flow alarms since alarm threshold adjusted to 2 while inpatient.     #Stage D HFrEF 2/2 ICM (EF 10-15%) s/p HM3 LVAD (7/28/22) and CRT-D ('06)  #CAD s/p PCI to LAD ('05)  - LVAD: LVAD speed 5100 (reduced from 5200 9/19/2022)  - Volume status: Mildly hypovolemic to euvolemic (weight on 9/15 discharge 134 lbs, weight ~stable throughout admission)   - GDMT              > BB: Discontinued during previous admission with cardiogenic shock              > ACEi/ARB/ARNi: PTA lisinopril 10 mg daily              > MRA: None               > SGLT2i: None   - SCD ppx: s/p CRT-D (2006)  - AC: warfarin  - PTA digoxin 125 mcg daily  - PTA atorvastatin 40 mg daily      RHC 9/23  RA 10/11/8 mmHg  RV 19/8 mmHg  PA 18/10/13 mmHg  CWP 5/4/4 mmHg  CO (Marley) 3.7 L/min  CI (Marley) 2.2 L/min/m2  CO (TD) 3.27 L/min  CI (TD) 1.94 L/min/m2    #Epistaxis, resolved  Had episode of epistaxis 9/29/2022 (day of discharge. Has history of epistaxis in past with previous ENT evaluations. ENT packed his nose. ENT recs as below.  - Recommend saline nasal spray every 2-4 hours  - Recommend Ayr nasal saline gel to anterior nares QHS, alternatively may use Aquaphor or Vaseline  - If bleeding recurs, spray Afrin (3 sprays) and hold firm digital pressure over the soft part of the nose for 20 minutes continuously. If bleeding continues despite these measures,  repeat. If bleeding continues or is severe please call ENT or return to ED if discharged.  - At home, to reduce frequency of epistaxis:                 - Recommend saline nasal spray BID to keep nasal mucosa moist                 - Recommend use of humidifiers if available to you                 - Recommend Ayr nasal saline gel to anterior nares QHS, alternatively may use Aquaphor or Vaseline  - Remainder of care per primary team, please do not hesitate to contact ENT with questions/concerns  - Clinic will follow up with patient regarding appointment.    #Hypomagnesemia  Mg replaced as needed.    #SLE  #APS  #Hx of DVT/PE  Pharmacy dosed warfarin (INR goal 2-3). Rheumatology was involved because of diarrhea once MMF was re-initiated during this admission. MMF switched to Myfortic per Rheum.   - continue PTA hydroxychloroquine 200mg daily  - Rheum recs:    - discontinued PTA mycophenolate 1500mg bid   - started Myfortic 9/5 (Myfortic 180 mg twice daily X 1 week, increase to 360 mg twice daily X 1 week, then stay on 540 mg twice daily)   - Follow-up with primary or rheumatology in 2 to 4 weeks to assess medication tolerability   - Follow-up with primary rheumatologist in 8 to 10 weeks      #HTN  Med changes as below  - changed hydralazine dose from 50mg to 25mg TID (decreased because of low flows)     #Depression  #MIGUEL  Has had low mood with multiple hospitalizations post LVAD. Psychiatry/Pscyhology consulted and meds adjusted as below.   - changed sertraline dose from 100mg to 75mg daily because of risk of diarrhea  - started Ritalin 5mg in the morning and early afternoon per Psychiatry recs (can increase to 10mg per dose as an outpatient if tolerated/needed)     #GERD  #Dysphagia  - discontinued pta pantoprazole 40mg daily because of risk of diarrhea     #Severe malnutrition in the context of acute on chronic illness  Nutrition was consulted. Continued PTA thiamine.     #BPH  Continued PTA tamsulosin 0.4mg daily      #Mild-moderate emphysema  Not using inhalers    Consultations This Hospital Stay   OCCUPATIONAL THERAPY ADULT IP CONSULT  NUTRITION SERVICES ADULT IP CONSULT  PHARMACY TO DOSE WARFARIN  INFECTIOUS DISEASE GENERAL ADULT IP CONSULT  NUTRITION SERVICES ADULT IP CONSULT  PHYSICAL THERAPY ADULT IP CONSULT  OCCUPATIONAL THERAPY ADULT IP CONSULT  RHEUMATOLOGY IP CONSULT  CARE MANAGEMENT / SOCIAL WORK IP CONSULT  PSYCHOLOGY ADULT IP CONSULT  PSYCHIATRY IP CONSULT  NURSING TO CONSULT FOR VASCULAR ACCESS CARE IP CONSULT  NURSING TO CONSULT FOR VASCULAR ACCESS CARE IP CONSULT  GI LUMINAL ADULT IP CONSULT  NURSING TO CONSULT FOR VASCULAR ACCESS CARE IP CONSULT  RHEUMATOLOGY IP CONSULT  NURSING TO CONSULT FOR VASCULAR ACCESS CARE IP CONSULT  ENT IP CONSULT  SMOKING CESSATION PROGRAM IP CONSULT  PHARMACY TO DOSE VANCO    Code Status   Full Code        Austin Can MD  [facility]  ______________________________________________________________________    Physical Exam   Vital Signs: Temp: 98  F (36.7  C) Temp src: Oral BP: (!) 87/75 Pulse: 90   Resp: 16 SpO2: 100 % O2 Device: None (Room air)    Weight: 134 lbs .63 oz  General: lying in bed, NAD  HEENT: no scleral icterus or conjunctival injection, oropharynx clear  Resp: clear to auscultation bilaterally, no crackles or wheezes  Cardiac: LVAD hum. JVD at lower/mid neck lying at 20 degrees.  Abdomen: soft, non-tender, non-distended. No rebound or guarding. Driveline site with gauze without swelling or tenderness.   Extremities: warm, no lower extremity edema  MSK: cachetic   Neuro: alert, answers questions appropriately  Psych: appropriate mood and affect         Primary Care Physician   Scar Jeffrey    Discharge Orders      Home Care Referral      Activity    Your activity upon discharge: activity as tolerated     Adult [facility] Follow-up and recommended labs and tests    - ENT will help in scheduling follow-up for nose bleeds  ([facility])  - You have a cardiology appt with Dr. Lora on 10/7/2022. You can have labs rechecked at that time ([facility])  - Follow-up with primary or rheumatology in 2 to 4 weeks to assess medication tolerability (since we started you on Myfortic) ([place])  - Follow-up with primary rheumatologist in 8 to 10 weeks ([place])    Appointments on [place] and/or [facility] (with [facility] or [facility] provider or service). Call [phone] if you haven't heard regarding these appointments within 7 days of discharge.     Reason for your hospital stay    You were in the hospital for diarrhea (you completed a course of antibiotics for C. Diff. Infection; you also had medication adjustments). You also were having LVAD low flow alarms which resolved with adjusting your LVAD. Lastly, you had a nose bleed which was treated by the ENT doctors.     Follow-up:   - ENT will help in scheduling follow-up for nose bleeds.   - You have a cardiology appt with Dr. Lora on 10/7/2022. You can have labs rechecked at that time  - Follow-up with primary or rheumatology in 2 to 4 weeks to assess medication tolerability (since we started you on Myfortic)  - Follow-up with primary rheumatologist in 8 to 10 weeks    For nose bleeds (per ENT):   - Recommend saline nasal spray every 2-4 hours  - Recommend Ayr nasal saline gel to anterior nares at night, alternatively may use Aquaphor or Vaseline  - If bleeding recurs, spray Afrin (3 sprays) and hold firm digital pressure over the soft part of the nose for 20 minutes continuously. If bleeding continues despite these measures, repeat. If bleeding continues or is severe please call ENT or return to ED if discharged.  - At home, to reduce frequency of epistaxis:                 - Recommend saline nasal spray twice daily to keep nasal mucosa moist                 - Recommend use of humidifiers if available to you                 - Recommend Ayr nasal saline gel to anterior nares at night,  alternatively may use Aquaphor or Vaseline     Diet    Follow this diet upon discharge: same as prior       Significant Results and Procedures      9/23/2022 TTE  Left ventricular chamber size is small, LVIDd = 3.9 cm  Left ventricular function is decreased. The ejection fraction is 15-20%  (severely reduced).  Global right ventricular function is normal.  The aortic valve remains closed during the cardiac cycle. There is mild  continuous aortic regurgitation.  No pericardial effusion is present.    9/23/2022 RHC  RA 10/11/8 mmHg  RV 19/8 mmHg  PA 18/10/13 mmHg  CWP 5/4/4 mmHg  CO (Marley) 3.7 L/min  CI (Marley) 2.2 L/min/m2  CO (TD) 3.27 L/min  CI (TD) 1.94 L/min/m2    MAP: 62  SVR: 1321 (using TD CO)  PVR 2.8 KNOWLES (using TD CO)    Due to low filling pressures and complaints of low flow alarms patient was given 1 Liter of saline bolus to reassess filling pressures.    RA 17/17/13 mmHg  RV 21/10 mmHg  PA 21/13/16 mmHg  CWP 11/10/9 mmHg  CO (Marley) 3.4 L/min  CI (Marley) 2.01 L/min/m2    MAP 62 mmHg   SVR 1153 dynes (using Marley CO)  PVR 2.1 KNOWLES (using Marley CO) Normal PA pressures. Reduced cardiac output level.    9/18/2022 CT AP w/ contrast  IMPRESSION:   1.  No evidence for pericolonic inflammatory edema or overt colonic wall thickening allowing for predominant collapse of the colon. No bowel obstruction.  2.  Left ventricular assist device. Minimal pericardial fluid with slightly more focal area of fluid posterior to the inferior margin of the sternum with slight peripheral enhancement surrounding this fluid. No evidence for gas within the fluid that would suggest overt signs of infection.  3.  Mildly enlarged lymph nodes in the left retroperitoneal region. Recommend continued close follow-up.    Discharge Medications   Discharge Medication List as of 9/29/2022  2:46 PM      START taking these medications    Details   methylcellulose (CITRUCEL) 500 MG TABS tablet Take 2 tablets (1,000 mg) by mouth 2 times daily, Disp-120  tablet, R-0, E-Prescribe      MYFORTIC (BRAND) 180 MG EC tablet Take 1 tablet (180 mg) by mouth 2 times daily, Disp-7 tablet, R-0, E-PrescribeMyfortic 180 mg twice daily through 10/2/2022, increase to 360 mg twice daily X 1 week through 10/9/2022, then 540 mg twice daily indefinitely (to be managed by primary Rheumatologist)                    methylphenidate (RITALIN) 5 MG tablet Take 1 tablet (5 mg) by mouth 2 times daily, Disp-60 tablet, R-0, E-PrescribeTake in the AM and in the afternoon around 1pm       !! - Potential duplicate medications found. Please discuss with provider.      CONTINUE these medications which have CHANGED    Details   acetaminophen (TYLENOL) 325 MG tablet Take 3 tablets (975 mg) by mouth every 8 hours as needed for mild pain, Disp-270 tablet, R-0, E-Prescribe      hydrALAZINE (APRESOLINE) 25 MG tablet Take 1 tablet (25 mg) by mouth 3 times daily, Disp-90 tablet, R-0, E-Prescribe      sertraline (ZOLOFT) 25 MG tablet Take 3 tablets (75 mg) by mouth daily, Disp-90 tablet, R-0, E-Prescribe         CONTINUE these medications which have NOT CHANGED    Details   atorvastatin (LIPITOR) 40 MG tablet Take 1 tablet (40 mg) by mouth every evening for 30 days, Disp-30 tablet, R-0, E-Prescribe      digoxin (LANOXIN) 125 MCG tablet Take 1 tablet (125 mcg) by mouth daily for 30 days, Disp-30 tablet, R-0, E-Prescribe      fluticasone-vilanterol (BREO ELLIPTA) 100-25 MCG/INH inhaler Inhale 1 puff into the lungs daily for 30 days, Disp-30 each, R-0, E-Prescribe      hydrocortisone 1 % CREA cream Place rectally 3 times dailyDisp-28 g, [id number]      hydroxychloroquine (PLAQUENIL) 200 MG tablet Take 1 tablet (200 mg) by mouth 2 times daily, Disp-60 tablet, R-0, E-Prescribe      hydrOXYzine (ATARAX) 25 MG tablet Take 1 tablet (25 mg) by mouth every 6 hours as needed for anxiety (ANXIETY), Disp-60 tablet, R-0, E-Prescribe      lisinopril (ZESTRIL) 10 MG tablet Take 1 tablet (10 mg) by mouth daily for 30  days, Disp-30 tablet, R-0, E-Prescribe      Melatonin 10 MG TABS tablet Take 1 tablet (10 mg) by mouth At Bedtime for 30 days, Disp-30 tablet, R-0, E-Prescribe      multivitamin w/minerals (THERA-VIT-M) tablet Take 1 tablet by mouth daily, R-0, No Print Out      pramox-pe-glycerin-petrolatum (PREPARATION H) 1-0.25-14.4-15 % CREA cream Place rectally 2 times daily as needed for hemorrhoids Apply immediately after a bowel movement.Disp-51 g, [id number]      promethazine (PHENERGAN) 12.5 MG tablet Take 1 tablet (12.5 mg) by mouth every 6 hours as needed for nausea or vomiting, Disp-30 tablet, R-1, E-Prescribe      !! QUEtiapine (SEROQUEL) 25 MG tablet Take 1 tablet (25 mg) by mouth 2 times daily for 30 days, Disp-60 tablet, R-0, E-Prescribe      !! QUEtiapine (SEROQUEL) 50 MG tablet Take 3 tablets (150 mg) by mouth At Bedtime for 30 days, Disp-90 tablet, R-0, E-Prescribe      saline nasal (AYR SALINE) GEL topical gel Apply into each nare At BedtimeHistorical      sodium chloride (OCEAN) 0.65 % nasal spray Spray 2 sprays into both nostrils every 4 hours (while awake), Historical      tamsulosin (FLOMAX) 0.4 MG capsule Take 0.4 mg by mouth daily, Historical      thiamine (B-1) 100 MG tablet Take 1 tablet (100 mg) by mouth daily, Disp-30 tablet, R-0, E-Prescribe      warfarin ANTICOAGULANT (COUMADIN) 2.5 MG tablet Take 2 tabs (5mg) daily at bedtime. Future dose adjustments pending INR, Disp-72 tablet, R-0, SARAH, E-Prescribe      zolpidem (AMBIEN) 5 MG tablet Take 5 mg by mouth nightly as needed for sleep, Historical       !! - Potential duplicate medications found. Please discuss with provider.      STOP taking these medications       mycophenolate (GENERIC EQUIVALENT) 500 MG tablet Comments:   Reason for Stopping:         pantoprazole (PROTONIX) 40 MG EC tablet Comments:   Reason for Stopping:         vancomycin (VANCOCIN) 125 MG capsule Comments:   Reason for Stopping:             Allergies   No Known Allergies

## 2022-09-29 NOTE — TELEPHONE ENCOUNTER
ANTICOAGULATION  MANAGEMENT: Discharge Review    Dandy Sands chart reviewed for anticoagulation continuity of care    Hospital Admission on 9/18-9/29 for diarrhea and low flow alarms.    Discharge disposition: Home with Home Care    Results:    Recent labs: (last 7 days)     09/23/22  0553 09/24/22  0625 09/25/22  0727 09/26/22  0613 09/27/22  0540 09/28/22  0532 09/29/22  0557   INR 1.79* 2.20* 2.28* 2.37* 2.17* 1.94* 2.12*     Anticoagulation inpatient management:     See calender    Anticoagulation discharge instructions:     Warfarin dosing: 10mg daily   Bridging: No   INR goal change: No      Medication changes affecting anticoagulation: Yes: Patient stopping pantoprazole and Vanco.  Patient is starting Citrucel, Ritalin and scheduled Tylenol 975mg Q 8 hours    Additional factors affecting anticoagulation: Yes: Patient is recovering from hospitalization     PLAN     No adjustment to anticoagulation plan needed. Per note from care coordinator [facility] to see patient tomorrow to check INR.     Patient not contacted    Anticoagulation Calendar updated    Holli Silva RN

## 2022-09-29 NOTE — TELEPHONE ENCOUNTER
FUTURE VISIT INFORMATION      FUTURE VISIT INFORMATION:    Date:   10/28/22    Time: 12:30pm    Location[place] [facility] [place]  REFERRAL INFORMATION:    Referring provider:  Alana Philippe MD    Referring providers clinic:  [facility]     Reason for visit/diagnosis  appt per pt - Recurrent epistaxis [R04.0] & septal perforation. referred by Alana Philippe MD. med recs in [facility].    RECORDS REQUESTED FROM:       Clinic name Comments Records Status Imaging Status   Imaging  8/22/22- CT Head   8/7/22- CT Head [facility]  pacs    [facility] ent 9/9/22- Note from Dr. [facility] 9/4/22- referral note from Alana Philippe MD Epic

## 2022-09-29 NOTE — CONSULTS
Otolaryngology Consult Note  September 29, 2022      CC: epistaxis    HPI: Dandy Sands is a 61 year old male with a past medical history of SLE, antiphospholipid syndrome, pulmonary embolism (on warfarin), implantable cardiac monitor s/p LVAD (7/28/22), and C. Diff infection  who is currently admitted 9/18/2022 for low flow alarms and ongoing profuse diarrhea. ENT was consulted for epistaxis.    Patient is known to service for prior epistaxis on previous admission. He has previously been packed with both resorbable and non-resorbable packing and a septal perforation was identified on previous exams. Per patient he has been packed one other time since his admission in August and did follow up with an ENT in [place]. Per patient he was scheduled for cauterization but had to be admitted before this could happen. He reports feeling blood go down the back of his throat yesterday and then blood started to come out of his nose. If he plugged one side the blood would come out the other due to the perforation. If he plugged his nose completely he said he could feel the blood go down the back of his throat. He does report coughing up at least one clot. He states that holding pressure on his nose does not help. He is not currently bleeding.     Past Medical History:   Diagnosis Date     Antiphospholipid antibody syndrome (H)      CAD (coronary artery disease)      Chronic systolic heart failure (H)      Ischemic cardiomyopathy      Mitral regurgitation      Systemic lupus erythematosus (H)        Past Surgical History:   Procedure Laterality Date     COLONOSCOPY N/A 05/23/2022    Procedure: COLONOSCOPY;  Surgeon: Parth Meneses MD;  Location:  OR     CV CORONARY ANGIOGRAM N/A 05/24/2022    Procedure: Coronary Angiogram;  Surgeon: Mike Pope MD;  Location:  HE[facility] CARDI[facility] CATH LAB     CV CORONARY ANGIOGRAM N/A 05/29/2022    Procedure: Coronary Angiogram;  Surgeon: Austin Bright MD;  Location:   HE[facility] CARDI[facility] CATH LAB     CV CORONARY LITHOTRIPSY PCI Bilateral 05/24/2022    Procedure: Percutaneous Coronary Intervention - Lithotripsy;  Surgeon: Mike Pope MD;  Location: [facility]     CV INTRA AORTIC BALLOON N/A 06/17/2022    Procedure: Intraprocedure Aortic Balloon Pump Insertion;  Surgeon: Sherman Cerda MD;  Location: [facility]     CV INTRA AORTIC BALLOON Right 07/03/2022    Procedure: Reposition of Subclavian Intraprocedure Aortic Balloon Pump;  Surgeon: Austin Bright MD;  Location:  HEART CARDIAC CATH LAB     CV INTRAVASULAR ULTRASOUND N/A 05/24/2022    Procedure: Intravascular Ultrasound;  Surgeon: Mike Pope MD;  Location:  HEART CARDIAC CATH LAB     CV PCI ANGIOPLASTY N/A 05/29/2022    Procedure: Percutaneous Transluminal Angioplasty;  Surgeon: Austin Bright MD;  Location: [facility]     CV PCI STENT DRUG ELUTING N/A 05/24/2022    Procedure: Percutaneous Coronary Intervention Stent;  Surgeon: Mike Pope MD;  Location:  HEART CARDIAC CATH LAB     CV RIGHT HEART CATH MEASUREMENTS RECORDED N/A 05/18/2022    Procedure: Right Heart Catheterization;  Surgeon: Justo Stewart MD;  Location:  HEART CARDIAC CATH LAB     CV RIGHT HEART CATH MEASUREMENTS RECORDED N/A 05/24/2022    Procedure: Right Heart Catheterization;  Surgeon: Mike Pope MD;  [facility]: [facility]     CV RIGHT HEART CATH MEASUREMENTS RECORDED N/A 05/29/2022    Procedure: Right Heart Catheterization;  Surgeon: Austin Bright MD;  Location: [facility]     CV RIGHT HEART CATH MEASUREMENTS RECORDED N/A 07/01/2022    Procedure: Right Heart Catheterization;  Surgeon: Mike Pope MD;  Location: [facility]     CV RIGHT HEART CATH MEASUREMENTS RECORDED N/A 9/23/2022    Procedure: Right Heart Catheterization;  Surgeon: Justo Stewart MD;  Location: [facility]     CV RIGHT HEART  EXERCISE STRESS STUDY N/A 05/18/2022    Procedure: Stress Drug Study;  Surgeon: Justo Stewart MD;  Location:  HEART CARDIAC CATH LAB     CV SWAN CHOCO PROCEDURE N/A 06/17/2022    Procedure: [place]ushing Choco Procedure;  Surgeon: Sherman Cerda MD;  Location:  HEART CARDIAC CATH LAB     INCISION AND DRAINAGE CHEST WASHOUT, COMBINED N/A 07/11/2022    Procedure: Chest washout and closure;  Surgeon: Darnell Morgan MD;  [facility]     INCISION AND DRAINAGE CHEST WASHOUT, COMBINED N/A 07/12/2022    Procedure: INCISION AND DRAINAGE, WOUND, CHEST, WITH IRRIGATION;  Surgeon: Darius Zamora MD;  Location: [facility]     INSERT ARTERIAL LINE  07/18/2022          INSERT INTRAAORTIC BALLOON PUMP Right 06/23/2022    Procedure: INSERTION, INTRA-AORTIC BALLOON PUMP RIGHT SUBCLAVIAN ARTERY.;  Surgeon: Hayden Veras MD;  Location: [facility]     INSERT VENTRICULAR ASSIST DEVICE LEFT (HEARTMATE II/III) MINIMALLY INVASIVE N/A 07/08/2022    Procedure: MEDIAN STERNOTOMY.  CARDIOPULMONARY BYPASS.  INTRAOPERATIVE TRANSESOPHAGEAL ECHOCARDIOGRAM.  IMPLANT LEFT VENTRICULAR ASSIST DEVICE HEARTMATE III.  PLACEMENT OF PERCUTANEOUS RIGHT VENTRICULAR ASSIST DEVICE.  TEMPORARY CHEST CLOSURE;  Surgeon: Darius Zamora MD;  [facility]     IR CHEST TUBE PLACEMENT NON-TUNNELED RIGHT  08/01/2022     IRRIGATION AND DEBRIDEMENT CHEST WASHOUT, COMBINED N/A 07/13/2022    Procedure: IRRIGATION AND DEBRIDEMENT, CHEST;  Surgeon: Darius Zamora MD;  Location: [facility]     MIDLINE DOUBLE LUMEN PLACEMENT Left 09/11/2022    Mid line ok to use     MIDLINE DOUBLE LUMEN PLACEMENT Right 09/14/2022    5FR DL midline.     PICC DOUBLE LUMEN PLACEMENT Right 05/28/2022    right basilic 5 fr dl picc 40 cm     PICC DOUBLE LUMEN PLACEMENT Right 08/15/2022    Right Lateral, 41 total length, 3 cm external length       No current outpatient medications on file.        No Known Allergies    Social History     Socioeconomic History     Marital status:  "Single     Spouse name: Not on file     Number of children: Not on file     Years of education: Not on file     Highest education level: Not on file   Occupational History     Not on file   Tobacco Use     Smoking status: Not on file     Smokeless tobacco: Not on file   Substance and Sexual Activity     Alcohol use: Not on file     Drug use: Not on file     Sexual activity: Not on file   Other Topics Concern     Not on file   Social History Narrative     Not on file     Social Determinants of Health     Financial Resource Strain: Not on file   Food Insecurity: Not on file   Transportation Needs: Not on file   Physical Activity: Not on file   Stress: Not on file   Social Connections: Not on file   Intimate Partner Violence: Not on file   Housing Stability: Not on file       No family history on file.    ROS: 12 point review of systems is negative unless noted in HPI.    PHYSICAL EXAM:  General: laying in bed, no acute distress  BP 95/79 (BP Location: Right arm)   Pulse 86   Temp 97.6  F (36.4  C) (Oral)   Resp 16   Ht 1.702 m (5' 7\")   Wt 60.8 kg (134 lb 0.6 oz)   SpO2 100%   BMI 20.99 kg/m    HEAD: normocephalic, atraumatic  Face: symmetrical, CN VII intact bilaterally (HB 1), no swelling, edema, or erythema.   Eyes: EOMI, clear sclera  Ears: no tragal tenderness, no external abnormality  Nose: see scope exam below  Mouth: moist, no ulcers, tongue midline and symmetric  Oropharynx: tonsils within normal limits, uvula midline, no oropharyngeal erythema,  small amount of bloody secretions coming from the left nasopharynx  Neck: no LAD, trachea midline  Neuro: alert and oriented  Respiratory: breathing non-labored on RA, no stridor  Skin: no rashes or skin lesions of the face/neck  Psych: pleasant affect  Cardio: extremities warm and well perfused     Control of epistaxis, rigid endoscopy:      To evaluate for epistaxis the nares were examined under endoscopic guidance. The right inferior turbinate and caudal " septum were clear with crusting noted in the posterior aspect of the septal perforation. The left inferior turbinate and caudal septum were initially clear with no active bleeding, masses, or lesions. Large perforation in septum was lined with bloody crusting particularly at the superior and posterior aspects. After scope was removed patient noted blood in his throat and his right nare started to drip blood. A large clot and crust was removed from the left nare and the source of bleeding was localized to the mid-septum just superior to the septal perforation on the left side. There was no other obvious bleeding on the inferior turbinate or posteriorly in the nose. Afrin and pressure was applied briefly. Two sticks of silver nitrate were used to cauterize the area superior to the perforation with oozing still occurring post-cautery.  Topical thrombin was applied to the source. A surgicel wrapped surgifoam was placed in the left nare applying pressure to the bleeding source. The packing was then saturated with Afrin and surrounded with Surgiflo. Patient noted the bleeding in his throat stopped.  Reevaluation there was no further bleeding anteriorly or posteriorly down the oropharynx. They tolerated the procedure well.      ROUTINE IP LABS (Last four results)  BMP  Recent Labs   Lab 09/29/22  0557 09/28/22  0532 09/27/22  0540 09/26/22  0613    137 136 137   POTASSIUM 4.0 4.2 4.2 4.2   CHLORIDE 104 105 104 106   ELDA 9.2 9.2 9.2 9.6   CO2 20* 21* 23 20*   BUN 19.2 19.0 17.4 17.5   CR 0.66* 0.69 0.66* 0.64*   GLC 97 98 98 111*     CBC  Recent Labs   Lab 09/29/22  0557 09/28/22  0532 09/27/22  0540 09/26/22  0613   WBC 6.0 5.2 4.9 5.3   RBC 3.52* 3.49* 3.37* 3.43*   HGB 10.4* 10.1* 9.7* 10.6*   HCT 32.6* 31.9* 31.4* 32.1*   MCV 93 91 93 94   MCH 29.5 28.9 28.8 30.9   MCHC 31.9 31.7 30.9* 33.0   RDW 16.1* 16.0* 16.2* 16.4*    231 190 221     INR  Recent Labs   Lab 09/29/22  0557 09/28/22  0532 09/27/22  0540  09/26/22  0613   INR 2.12* 1.94* 2.17* 2.37*           Assessment and Plan  Dandy Sands is a 61 year old male with a past medical history of SLE, antiphospholipid syndrome, pulmonary embolism (on warfarin), implantable cardiac monitor s/p LVAD (7/28/22), and C. Diff infection with prior epistaxis. ENT was consulted today for additional episode of epistaxis. On scope exam, crusting noted along septal perforation with source of bleeding identified on superior aspect of septal perforation in the left nasal passage. Cautery was performed with incomplete hemostasis and further resorbable packing was placed in the left nare. Due to the significant septal perforation and recurrent epistaxis, recommend patient follow up with ENT. Patient may see current ENT in [place] or an ENT here since he will be around for 1 month. A message will be sent to clinic for availability.    - Recommend saline nasal spray every 2-4 hours  - Recommend Ayr nasal saline gel to anterior nares QHS, alternatively may use Aquaphor or Vaseline  - If bleeding recurs, spray Afrin (3 sprays) and hold firm digital pressure over the soft part of the nose for 20 minutes continuously. If bleeding continues despite these measures, repeat. If bleeding continues or is severe please call ENT or return to ED if discharged.  - At home, to reduce frequency of epistaxis:                 - Recommend saline nasal spray BID to keep nasal mucosa moist                 - Recommend use of humidifiers if available to you                 - Recommend Ayr nasal saline gel to anterior nares QHS, alternatively may use Aquaphor or Vaseline  - Remainder of care per primary team, please do not hesitate to contact ENT with questions/concerns  - Clinic will follow up with patient regarding appointment.    --Patient and plan to be discussed with Dr. Bush.      Saba Wilson PA-C  Otolaryngology-Head & Neck Surgery  Please page ENT with questions by dialing [phone] and  entering job code [phone] when prompted.

## 2022-09-29 NOTE — TELEPHONE ENCOUNTER
This patient can be scheduled for a New Nose Bleed appt with Dr. Mae, Ratna, or Taylor if availability works with his schedule (only in town for a month).    Appt Note- epistaxis and septal perforation

## 2022-09-29 NOTE — PROGRESS NOTES
Change in up titration:    Please follow the up titrartion recommendations below:    -- Start Myfortic 180 mg twice daily X 1 week, increase to 360 mg twice daily X 1 week, then 540 mg BID and stay on 540 mg BID.    -- Follow-up with primary or rheumatology in 2 to 4 weeks to assess medication tolerability  -- Follow-up with primary rheumatologist in 8 to 10 weeks.    Everett Solis,  Rheumatology Fellow,  Pager: [phone].    Discussed on rounds. Chart independently reviewed by me. Agree with documentation above.  Troy Duffy MD  Rheumatology Staff

## 2022-09-29 NOTE — PLAN OF CARE
Pmhx: 62yo with HM3 LVAD for NICM 7/28/22 who was recently diagnosed with a c.diff infection and discharged on 9/15/22 with oral vancomycin treatment here with 2 low flow alarms, decreased oral intake, and on going diarrhea. He is hypovolemic on exam. LVAD sounds present. Low flows are likely due to hypovolemia.  Shift: 15:00-23:30  Neuro: A&O x 4. Pleasant and comfortable.  Resp: Lungs CTA, diminished, sats 90s on RA.  CV: SR/ST 90-100s with BBB and 0-5 PVCs. MAPs low 60s, hydralazine held x 1.No low flow alarms, LVAD #s WNL.  GI/: Eating well, no BMs so far this shift. Pushing fluids, adequate urine output.  Mobility: Stable with SBA.  Plan: Continue on enteric precautions. Monitor LVAD alarms. Possible discharge tomorrow. Assist as needed.

## 2022-09-29 NOTE — PLAN OF CARE
D: currently admitted 9/18/2022 for low flow alarms and ongoing profuse diarrhea. Completed course of po vancomycin, encouraging fluids, and trialing stool bulking agents. No low flow alarms since alarm threshold adjusted to 2.     PMHx:SLE, antiphospholipid syndrome, pulmonary embolism (on warfarin), ICM (EF 10-15%) s/p HM3 LVAD (7/28/22), and C. Diff infection (on PO vancomycin taper)     I: Monitored vitals and assessed pt status. Encouraged activity.      Changed: Spoke with provider about mag level. Spoke with pt and provider about plan to place IV just for IV mag replacement and then immediately discontinue IV. Patient can then discharge. Plan for about 3 pm. Son will be picking patient up. Pt had nosebleed. Notified provider. Pt holding pressure. ENT came and fixed nosebleed.  Pt tolerated mag. Provider spoke with son about potential to discharge.   IV Access: PIV  Running:  NA  PRN: NA  Tele: SR 1stdeg AVB,  O2: RA  Mobility: SBA   Skin: LVAD drive line     A: A&Ox4. VSS. Afebrile. No flow alarms for LVAD and all LVAD numbers WDL. MAPs are wdl. Patient had some mild shoulder pain that he denied medication for this morning. Numbness in right arm (baseline per patient). Otherwise no numbness or tingling. No SOB. No CP. Lungs are clear to dim. Patient urinal voiding and notifies nurse if needs to go to bathroom to have BM. Only 1 BM reported     P: Continue to monitor pt status and report changes to treatment team. Maybe discharge this afternoon? At the latest tomorrow morning

## 2022-09-30 ENCOUNTER — CARE COORDINATION (OUTPATIENT)
Dept: CARDIOLOGY | Facility: CLINIC | Age: 62
End: 2022-09-30

## 2022-09-30 ENCOUNTER — TELEPHONE (OUTPATIENT)
Dept: CARDIOLOGY | Facility: CLINIC | Age: 62
End: 2022-09-30

## 2022-09-30 ENCOUNTER — PATIENT OUTREACH (OUTPATIENT)
Dept: CARE COORDINATION | Facility: CLINIC | Age: 62
End: 2022-09-30

## 2022-09-30 ENCOUNTER — ANTICOAGULATION THERAPY VISIT (OUTPATIENT)
Dept: ANTICOAGULATION | Facility: CLINIC | Age: 62
End: 2022-09-30

## 2022-09-30 DIAGNOSIS — I50.22 CHRONIC SYSTOLIC CONGESTIVE HEART FAILURE (H): Primary | ICD-10-CM

## 2022-09-30 DIAGNOSIS — I50.20 HEART FAILURE WITH REDUCED EJECTION FRACTION, NYHA CLASS III (H): ICD-10-CM

## 2022-09-30 DIAGNOSIS — Z95.811 LVAD (LEFT VENTRICULAR ASSIST DEVICE) PRESENT (H): ICD-10-CM

## 2022-09-30 LAB — INR (EXTERNAL): 2.6 (ref 0.9–1.1)

## 2022-09-30 NOTE — TELEPHONE ENCOUNTER
[facility] Call Center    Phone Message    May a detailed message be left on voicemail: yes     Reason for Call: Order(s): Home Care Orders: Skilled Nursing:   PTE Eval for balance and mobility  Skilled nursing visits twice a week for 1 week then 3 times a week for 1 week, then once a week for 6 weeks, then 4 as needed visits for INR management and disease process education.  LVAD dressing changes daily to be performed by family      Action Taken: Message routed to:  [facility] cardio    Travel Screening: Not Applicable     Thank you!  [facility]

## 2022-09-30 NOTE — PLAN OF CARE
Physical Therapy Discharge Summary    Reason for therapy discharge:    Discharged to home with outpatient therapy.    Progress towards therapy goal(s). See goals on Care Plan in [facility] electronic health record for goal details.  Goals partially met.  Barriers to achieving goals:   discharge from facility.    Therapy recommendation(s):    Continued therapy is recommended.  Rationale/Recommendations:  to improve functional endurance capacity.

## 2022-09-30 NOTE — PROGRESS NOTES
Called patient/caregiver to check in 24hr post discharge for admission for diarrhea, low flow alarms, dehydration. Pt reports VAD parameters WNL, no alarms for a few days. Pt has not weighed himself yet today. Encouraged him to weigh today to establish a home baselinle. Reviewed medications and answered any questions. Patient reports sleeping well and no anxiety since being home with LVAD. Patient is able to move around the house and care for himself mostly independently, utilizes a walker when he leaves the apartment.     Discussed specific new problems/stressors since being discharged from the hospital: encouraged pt to contact Home Care to discuss visit timing, and to make an appt with rheumatology as recommend in discharge summary. Provided contact info for both.  Empathized with patient and reviewed coping strategies: enlisting support from friends and love ones, attending patient and caregiver support groups, reviewing LVAD educational materials to reinforce knowledge, and talking about concerns with family/care providers/trusted others. Encouraged pt to page VAD Coordinator with any issues or questions. Pt verbalizes understanding.

## 2022-09-30 NOTE — PROGRESS NOTES
ANTICOAGULATION MANAGEMENT     Dandy Sands 61 year old male is on warfarin with therapeutic INR result. (Goal INR 2.0-3.0)    Recent labs: (last 7 days)     09/30/22  1419   INR 2.6*       ASSESSMENT       Source(s): Chart Review and Home Care/Facility Nurse       Warfarin doses taken: While hospitalized on 9/29/22: more warfarin administered than maintenance regimen .  Discharged on: increase dose to 10mg every Mon, Thurs; 7.5mg all other days    Diet: Protein bar started which maybe affecting INR    New illness, injury, or hospitalization: Yes: Discharged on 9/29/22.  Low flow alarms, see previous encounter    Patient diarrhea is resolving, loose stools    Medication/supplement changes: None noted    Signs or symptoms of bleeding or clotting: No    Previous INR: Therapeutic last visit; previously outside of goal range    Additional findings: Confirmed tablet size and dosing of Warfarin on 9/29/22.  Consulted Hanh Pabon for dosing.  Patient does not drink Ensure/Boost.       PLAN     Recommended plan for ongoing change(s) affecting INR     Dosing Instructions: change your warfarin dose to 10mg every Mon, Thurs; 7.5mg all other days with next INR in 3 days       Summary  As of 9/30/2022    Full warfarin instructions:  10 mg every Mon, Thu; 7.5 mg all other days   Next INR check:  10/3/2022             Telephone call with Anoop home care nurse who verbalizes understanding and agrees to plan and who agrees to plan and repeated back plan correctly    Orders given to  Homecare nurse/facility to recheck    Education provided: Monitoring for bleeding signs and symptoms, Monitoring for clotting signs and symptoms, When to seek medical attention/emergency care, Importance of notifying clinic for changes in medications; a sooner lab recheck maybe needed. and Importance of notifying clinic for diarrhea, nausea/vomiting, reduced intake, and/or illness; a sooner lab recheck maybe needed.    Plan made with [facility] Pharmacist  Pepper Sneed and per LVAD protocol    Chon Tinsley, RN  Anticoagulation Clinic  9/30/2022    _______________________________________________________________________     Anticoagulation Episode Summary     Current INR goal:  2.0-3.0   TTR:  100.0 % (1 d)   Target end date:  Indefinite   Send INR reminders to:  ANTICOAG LVAD    Indications    Chronic systolic congestive heart failure (H) [I50.22]  LVAD (left ventricular assist device) present (H) [Z95.811]  Heart failure with reduced ejection fraction  NYHA class III (H) [I50.20]           Comments:  Follow VAD Anticoag protocol:Yes: HeartMate 3   Bridging: Call MD each time   Date VAD placed: 7/8/22         Anticoagulation Care Providers     Provider Role Specialty Phone number    Kelly Lora MD Referring Cardiovascular Disease [phone]

## 2022-09-30 NOTE — PROGRESS NOTES
Received a VM message from [facility] nurse with INR today of 2.6.  No changes after hospital discharge and loose stools present but better.  Please call patricia at [phone]  Thank you.  Shelia Medina, RN  Anticoagulation Nurse - [facility]

## 2022-09-30 NOTE — PROGRESS NOTES
General acute hospital    Background: Transitional Care Management program auto-identified and prompting a chart review by [facility] team.    Assessment: Upon chart review, [facility] Team member will cancel/close this episode of Transitional Care Management program due to reason below:    Patient has active communication with a nurse, provider or care team for reason of post-hospital follow up plan.  Outreach call by [facility] team not indicated to minimize duplicative efforts.     Plan: Transitional Care Management episode closed per reason above.      PETER Gamez  [facility] [place]    *Connected Care Resource Team does NOT follow patient ongoing. Referrals are identified based on internal discharge reports and the outreach is to ensure patient has an understanding of their discharge instructions.

## 2022-10-03 ENCOUNTER — HEALTH MAINTENANCE LETTER (OUTPATIENT)
Age: 62
End: 2022-10-03

## 2022-10-03 ENCOUNTER — ANTICOAGULATION THERAPY VISIT (OUTPATIENT)
Dept: ANTICOAGULATION | Facility: CLINIC | Age: 62
End: 2022-10-03

## 2022-10-03 DIAGNOSIS — I50.20 HEART FAILURE WITH REDUCED EJECTION FRACTION, NYHA CLASS III (H): ICD-10-CM

## 2022-10-03 DIAGNOSIS — Z95.811 LVAD (LEFT VENTRICULAR ASSIST DEVICE) PRESENT (H): ICD-10-CM

## 2022-10-03 DIAGNOSIS — I50.22 CHRONIC SYSTOLIC CONGESTIVE HEART FAILURE (H): Primary | ICD-10-CM

## 2022-10-03 LAB — INR (EXTERNAL): 2.5 (ref 0.9–1.1)

## 2022-10-03 NOTE — TELEPHONE ENCOUNTER
[facility] Call Center    Phone Message    May a detailed message be left on voicemail: no     Reason for Call: Other: Anoop is calling back, as he has not had a reply to his message. Please reach out to Anoop as soon as possible,      Action Taken: Message routed to:  [facility] Cardiology    Travel Screening: Not Applicable

## 2022-10-03 NOTE — PROGRESS NOTES
ANTICOAGULATION MANAGEMENT     Dandy Sands 61 year old male is on warfarin with therapeutic INR result. (Goal INR 2.0-3.0)    Recent labs: (last 7 days)     10/03/22  1350   INR 2.5*       ASSESSMENT       Source(s): Chart Review, Patient/Caregiver Call and Home Care/Facility Nurse       Warfarin doses taken: Warfarin taken as instructed since last encounter-recently hospitalized, more warfarin administered than maintenance    Diet: loost stools have restarted-reports ongoing for 5 days with 3 stools daily Has been taking fiber-will hold fiber per HHN. Protein bar started which maybe affecting INR    New illness, injury, or hospitalization: Yes: hospitalization with discharge on 9/29/22    Medication/supplement changes: HHN reports that patient has been taking a Laxative while having loose stools.    Signs or symptoms of bleeding or clotting: No    Previous INR: Therapeutic last 2(+) visits    Additional findings: INR 2.6 at last encounter, 2.5 today. Last 7 day total: 60 mg Next 7 day total: 57.5 mg.     Patient is hesitant to take new maintenance dose, history   of taking 5 mg daily     PLAN     Recommended plan for ongoing change(s) affecting INR     Dosing Instructions: Continue your current warfarin dose with next INR in 3 days       Summary  As of 10/3/2022    Full warfarin instructions:  10 mg every Mon, Thu; 7.5 mg all other days   Next INR check:  10/6/2022             Telephone call with [facility] care nurse who verbalizes understanding and agrees to plan and who agrees to plan and repeated back plan correctly    Orders given to  Homecare nurse/facility to recheck    Education provided: Goal range and significance of current result, Importance of taking warfarin as instructed and Contact [phone] with any changes, questions or concerns.     Plan made per [facility] anticoagulation protocol and per LVAD protocol    LORENA MOSES RN  Anticoagulation  Clinic  10/3/2022    _______________________________________________________________________     Anticoagulation Episode Summary     Current INR goal:  2.0-3.0   TTR:  100.0 % (4 d)   Target end date:  Indefinite   Send INR reminders to:  ANTICOAG LVAD    Indications    Chronic systolic congestive heart failure (H) [I50.22]  LVAD (left ventricular assist device) present (H) [Z95.811]  Heart failure with reduced ejection fraction  NYHA class III (H) [I50.20]           Comments:  Follow VAD Anticoag protocol:Yes: HeartMate 3   Bridging: Call MD each time   Date VAD placed: 7/8/22         Anticoagulation Care Providers     Provider Role Specialty Phone number    Kelly Lora MD Referring Cardiovascular Disease [phone]

## 2022-10-03 NOTE — TELEPHONE ENCOUNTER
Returned phone call to Anoop at [facility]. Agreed with the following orders:    PTE Eval for balance and mobility  Skilled nursing visits twice a week for 1 week then 3 times a week for 1 week, then once a week for 6 weeks, then 4 as needed visits for INR management and disease process education.  LVAD dressing changes daily to be performed by family

## 2022-10-04 DIAGNOSIS — I50.22 CHRONIC SYSTOLIC CONGESTIVE HEART FAILURE (H): ICD-10-CM

## 2022-10-04 DIAGNOSIS — Z79.899 LONG TERM USE OF DRUG: ICD-10-CM

## 2022-10-04 DIAGNOSIS — Z95.811 LEFT VENTRICULAR ASSIST DEVICE PRESENT (H): ICD-10-CM

## 2022-10-05 NOTE — PROGRESS NOTES
Cardiology Clinic Note    HPI  Mr. Dandy Sands is a pleasant 61 year old male with a past medical history of SLE, antiphospholipid syndrome, pulmonary embolism (on warfarin), ICM (EF 10-15%) s/p HM3 LVAD (7/28/22), and C. Diff who presents to clinic today for follow up.    I first met him May 2022.  He has brought in for consideration of elevated risk bypass surgery, however we elected to do to a multivessel PCI.  He subsequently returned a month later in cardiogenic shock from [place], with LFTs over thousand.  June 2022 we performed a evaluation for LVAD and transplant, and we had him at one point listed for transplant, however due to his elevated antibody levels associated with his lupus, we are unable to get a adequate donor offer, thus we proceeded with LVAD therapy as probable destination given his amount of antibodies.  His LVAD surgery was complicated by severe right ventricular failure requiring temporary RVAD.  He also had severe delirium and at one point tearing out his bridled nasogastric tube, causing a nasal septal perforation and severe nosebleeding.  He was discharged to rehab and then we presented with severe C. difficile diarrhea, dehydration, low flow alarms.  He has now been discharged from the hospital and this is his first outpatient appointment clinic within our system.    He presents with his son.  He has been staying at the local [facility].  He will still have occasional lightheaded and dizziness upon standing.  He no longer is having low flow alarms, flows generally get as low as 2.4 but are around 3.  Speed is set to 5100 and does have a few associated speed drops.  He did have an episode of epistaxis when his INR get to 2.8.  He otherwise denies any chest pain or shortness of breath.  He does not have any lower extremity edema.    Cardiac Medications:  - Hydralazine 25 mg TID  - Warfarin  - Lisinopril 10 mg daily  - Digoxin 125 mcg every day  - Atorvastatin 40 mg  nightly    PAST MEDICAL HISTORY:  Patient Active Problem List   Diagnosis     Decompensated heart failure (H)     Cardiogenic shock (H)     LVAD (left ventricular assist device) present (H)     Chronic systolic congestive heart failure (H)     Heart failure with reduced ejection fraction, NYHA class III (H)     Lightheadedness     Recurrent epistaxis     Epistaxis     Complication involving left ventricular assist device (LVAD)     Acute diarrhea     C. difficile diarrhea        FAMILY HISTORY:  Father with hypertension, mother with coronary disease requiring a 5 vessel CABG, sister with alcohol and drug use    SOCIAL HISTORY:  Former smoker, quit in 2009 after 25 pack years  Very rare alcohol use    ALLERGIES:  No Known Allergies    CURRENT MEDICATIONS:  Current Outpatient Medications   Medication Sig Dispense Refill     acetaminophen (TYLENOL) 325 MG tablet Take 3 tablets (975 mg) by mouth every 8 hours as needed for mild pain 270 tablet 0     atorvastatin (LIPITOR) 40 MG tablet Take 1 tablet (40 mg) by mouth every evening for 30 days 30 tablet 0     digoxin (LANOXIN) 125 MCG tablet Take 1 tablet (125 mcg) by mouth daily for 30 days 30 tablet 0     hydrALAZINE (APRESOLINE) 25 MG tablet Take 1 tablet (25 mg) by mouth 3 times daily 90 tablet 0     hydrocortisone 1 % CREA cream Place rectally 3 times daily 28 g 0     hydroxychloroquine (PLAQUENIL) 200 MG tablet Take 1 tablet (200 mg) by mouth 2 times daily 60 tablet 0     lisinopril (ZESTRIL) 10 MG tablet Take 1 tablet (10 mg) by mouth daily for 30 days 30 tablet 0     multivitamin w/minerals (THERA-VIT-M) tablet Take 1 tablet by mouth daily  0     MYFORTIC (BRAND) 180 MG EC tablet Take 1 tablet (180 mg) by mouth 2 times daily 149 tablet 0     pramox-pe-glycerin-petrolatum (PREPARATION H) 1-0.25-14.4-15 % CREA cream Place rectally 2 times daily as needed for hemorrhoids Apply immediately after a bowel movement. 51 g 0     promethazine (PHENERGAN) 12.5 MG tablet Take  "1 tablet (12.5 mg) by mouth every 6 hours as needed for nausea or vomiting 30 tablet 1     saline nasal (AYR SALINE) GEL topical gel Apply into each nare At Bedtime       sodium chloride (OCEAN) 0.65 % nasal spray Spray 2 sprays into both nostrils every 4 hours (while awake)       tamsulosin (FLOMAX) 0.4 MG capsule Take 0.4 mg by mouth daily       thiamine (B-1) 100 MG tablet Take 1 tablet (100 mg) by mouth daily 30 tablet 0     warfarin ANTICOAGULANT (COUMADIN) 2.5 MG tablet Take 2 tabs (5mg) daily at bedtime. Future dose adjustments pending INR 72 tablet 0     methylcellulose (CITRUCEL) 500 MG TABS tablet Take 2 tablets (1,000 mg) by mouth 2 times daily (Patient not taking: Reported on 10/7/2022) 120 tablet 0     QUEtiapine (SEROQUEL) 25 MG tablet Take 1 tablet (25 mg) by mouth 2 times daily for 30 days 60 tablet 0     QUEtiapine (SEROQUEL) 50 MG tablet Take 3 tablets (150 mg) by mouth At Bedtime for 30 days 90 tablet 0     sertraline (ZOLOFT) 25 MG tablet Take 3 tablets (75 mg) by mouth daily (Patient not taking: Reported on 10/7/2022) 90 tablet 0     zolpidem (AMBIEN) 5 MG tablet Take 5 mg by mouth nightly as needed for sleep (Patient not taking: Reported on 10/7/2022)         ROS:   10-point ROS negative except for H&P    EXAM:  BP (!) 60/0 (BP Location: Right arm, Patient Position: Chair, Cuff Size: Adult Regular)   Ht 1.659 m (5' 5.32\")   Wt 63.6 kg (140 lb 3.2 oz)   SpO2 95%   BMI 23.11 kg/m      General: appears comfortable, alert and articulate  Head: normocephalic, atraumatic  Eyes: anicteric sclera, EOMI  Heart: LVAD hum, JVP 7 cm  Lungs: clear, no rales or wheezing  Abdomen: soft  Extremities: no edema  Neurological: normal speech and affect, no gross motor deficits    Weight  Wt Readings from Last 10 Encounters:   10/07/22 63.6 kg (140 lb 3.2 oz)   09/29/22 60.8 kg (134 lb 0.6 oz)   09/15/22 61.2 kg (134 lb 14.7 oz)   09/04/22 59.1 kg (130 lb 6.4 oz)   08/21/22 59.3 kg (130 lb 12.8 oz)   07/08/22 " 62.6 kg (138 lb)   06/30/22 65.8 kg (145 lb)   05/29/22 65 kg (143 lb 4.8 oz)   05/26/22 64.6 kg (142 lb 8 oz)       Labs:    CBC RESULTS:  Lab Results   Component Value Date    WBC 6.6 10/07/2022    RBC 3.79 (L) 10/07/2022    HGB 11.3 (L) 10/07/2022    HCT 34.9 (L) 10/07/2022    MCV 92 10/07/2022    MCH 29.8 10/07/2022    MCHC 32.4 10/07/2022    RDW 15.5 (H) 10/07/2022     10/07/2022       CMP RESULTS:  Lab Results   Component Value Date     10/07/2022    POTASSIUM 4.5 10/07/2022    POTASSIUM 3.9 09/01/2022    POTASSIUM 5.2 07/09/2022    CHLORIDE 107 10/07/2022    CHLORIDE 112 (H) 09/01/2022    CO2 21 (L) 10/07/2022    CO2 22 09/01/2022    ANIONGAP 12 10/07/2022    ANIONGAP 6 09/01/2022     (H) 10/07/2022    GLC 94 09/01/2022    BUN 12.8 10/07/2022    BUN 13 09/01/2022    CR 0.81 10/07/2022    GFRESTIMATED >90 10/07/2022    ELDA 9.6 10/07/2022    BILITOTAL 0.4 10/07/2022    ALBUMIN 4.3 10/07/2022    ALBUMIN 3.1 (L) 09/01/2022    ALKPHOS 125 10/07/2022    ALT 36 10/07/2022    AST 27 10/07/2022        Testing/Procedures:  I personally visualized and interpreted:    TTE 9/23/22:  Interpretation Summary  HM3 LVAD is present but not well seen on today's study.     Left ventricular chamber size is small, LVIDd = 3.9 cm  Left ventricular function is decreased. The ejection fraction is 15-20%  (severely reduced).  Global right ventricular function is normal.  The aortic valve remains closed during the cardiac cycle. There is mild  continuous aortic regurgitation.  No pericardial effusion is present.     LV cavity size is smaller with cavity obliteration in standing position ,  LVIDd = 3.0 cm    EKG 9/20/22:  Sinus with PVCs, RBBB    RHC 9/23/22:  RA 10/11/8 mmHg  RV 19/8 mmHg  PA 18/10/13 mmHg  CWP 5/4/4 mmHg  CO (Marley) 3.7 L/min  CI (Marley) 2.2 L/min/m2  CO (TD) 3.27 L/min  CI (TD) 1.94 L/min/m2    MAP: 62  SVR: 1321 (using TD CO)  PVR 2.8 KNOWLES (using TD CO)    Due to low filling pressures and complaints  of low flow alarms patient was given 1 Liter of saline bolus to reassess filling pressures.    RA 17/17/13 mmHg  RV 21/10 mmHg  PA 21/13/16 mmHg  CWP 11/10/9 mmHg  CO (Marley) 3.4 L/min  CI (Marley) 2.01 L/min/m2    MAP 62 mmHg   SVR 1153 dynes (using Marley CO)  PVR 2.1 KNOWLES (using Marley CO)     Assessment and Plan:   In summary, Mr. Dandy Sands is a pleasant 61 year old male with a past medical history of SLE, antiphospholipid syndrome, pulmonary embolism (on warfarin), ICM (EF 10-15%) s/p HM3 LVAD (7/28/22), and C. Diff who presents to clinic today for follow up.    Chronic systolic heart failure/HFrEF (EF 10-15%) secondary to ischemic cardiomyopathy  NYHA Symptom Class III  Stage D  ACE-I/ARB/ARNi: Lisinopril 10 mg daily  He is on hydralazine, eventual transition completely to lisinopril  Not on beta-blocker due to severe RV failure, hypotension  Aldosterone antagonist has not been started yet  SGLT2i: None due to recurrent hypokalemia  SCD prophylaxis is an ICD  Fluid status hypovolemic  Diuretic: None  Cardiac Rehab: Referral previously placed    Follow-up next week with me    LVAD speed 5100, frequent speed drops, PI's ranged from 2.47, flows generally in the 2.5-3 range  Warfarin to maintain INR goal of 2-3  Currently off aspirin due to recurrent epistaxis    #SLE  #antiphospholipid syndrome  #pulmonary embolism:  - Continues on warfarin  - Recently started on myfortic 540 mg BID, I think can be switched back to MMF given that his diarrheal symptoms are not from MMF colitis  - Follow up with Rheum    #c diff  #diarrhea:  - Improved; completed vanc    Follow-up in 1 week    The patient states understanding and is agreeable with plan.   Feel free to contact myself regarding questions or concerns.  It was a pleasure to see this patient today.    Kelly Lora MD   of Medicine  Advanced Heart Failure and Transplant Cardiology

## 2022-10-06 ENCOUNTER — CARE COORDINATION (OUTPATIENT)
Dept: CARDIOLOGY | Facility: CLINIC | Age: 62
End: 2022-10-06

## 2022-10-06 ENCOUNTER — ANTICOAGULATION THERAPY VISIT (OUTPATIENT)
Dept: ANTICOAGULATION | Facility: CLINIC | Age: 62
End: 2022-10-06

## 2022-10-06 DIAGNOSIS — I50.20 HEART FAILURE WITH REDUCED EJECTION FRACTION, NYHA CLASS III (H): ICD-10-CM

## 2022-10-06 DIAGNOSIS — I50.22 CHRONIC SYSTOLIC CONGESTIVE HEART FAILURE (H): Primary | ICD-10-CM

## 2022-10-06 DIAGNOSIS — Z95.811 LVAD (LEFT VENTRICULAR ASSIST DEVICE) PRESENT (H): ICD-10-CM

## 2022-10-06 LAB — INR (EXTERNAL): 2.7 (ref 0.9–1.1)

## 2022-10-06 NOTE — PROGRESS NOTES
ANTICOAGULATION MANAGEMENT     Dandy Sands 61 year old male is on warfarin with therapeutic INR result. (Goal INR 2.0-3.0)    Recent labs: (last 7 days)     10/06/22  1308   INR 2.7*       ASSESSMENT       Source(s): Chart Review and Home Care/Facility Nurse       Warfarin doses taken: Warfarin taken as instructed    Diet: No new diet changes identified    New illness, injury, or hospitalization: No    Medication/supplement changes: None noted    Signs or symptoms of bleeding or clotting: No    Previous INR: Therapeutic last 2(+) visits    Additional findings: None       PLAN     Recommended plan for ongoing change(s) affecting INR     Dosing Instructions: Continue your current warfarin dose with next INR in 4 days       Summary  As of 10/6/2022    Full warfarin instructions:  7.5 mg every day   Next INR check:  10/10/2022             Telephone call with [facility] home care nurse who verbalizes understanding and agrees to plan and who agrees to plan and repeated back plan correctly    Orders given to  Homecare nurse/facility to recheck    Education provided: Goal range and significance of current result, Monitoring for bleeding signs and symptoms and Contact [phone] with any changes, questions or concerns.     Plan made per [facility] anticoagulation protocol and per LVAD protocol    LORENA MOSES RN  Anticoagulation Clinic  10/6/2022    _______________________________________________________________________     Anticoagulation Episode Summary     Current INR goal:  2.0-3.0   TTR:  100.0 % (1 wk)   Target end date:  Indefinite   Send INR reminders to:  ANTICOAG LVAD    Indications    Chronic systolic congestive heart failure (H) [I50.22]  LVAD (left ventricular assist device) present (H) [Z95.811]  Heart failure with reduced ejection fraction  NYHA class III (H) [I50.20]           Comments:  Follow VAD Anticoag protocol:Yes: HeartMate 3   Bridging: Call MD each time   Date VAD placed: 7/8/22         Anticoagulation  Care Providers     Provider Role Specialty Phone number    Kelly Lora MD Referring Cardiovascular Disease [phone]

## 2022-10-06 NOTE — PROGRESS NOTES
Called patient/caregiver to check in 1 week post discharge. Pt reports VAD parameters WNL and weight stable at 130lb. Reviewed medications and answered any questions. Patient reports sleeping well and no anxiety since being home with LVAD. Patient is able to move around the house and care for himself independently, uses assistance of walker for longer distances.     Discussed specific new problems/stressors since being discharged from the hospital: pt continues to have diarrhea/soft stools. Same amount/consistency from when he was in the hospital. He has not had any VAD alarms. Empathized with patient and reviewed coping strategies: enlisting support from friends and love ones, attending patient and caregiver support groups, reviewing LVAD educational materials to reinforce knowledge, and talking about concerns with family/care providers/trusted others. Encouraged pt to page VAD Coordinator with any issues or questions. Pt verbalizes understanding.

## 2022-10-06 NOTE — PROGRESS NOTES
10/6/22 Addendum:  Cathy HERCULES called back. She reports Dandy has been taking 7.5 mg of warfarin daily since discharge from [facility] (9/29/22).  He will continue this dose and recheck INR 10/10/22.  Calendar updated.  Leatha Abbott RN

## 2022-10-07 ENCOUNTER — DOCUMENTATION ONLY (OUTPATIENT)
Dept: ANTICOAGULATION | Facility: CLINIC | Age: 62
End: 2022-10-07

## 2022-10-07 ENCOUNTER — LAB (OUTPATIENT)
Dept: LAB | Facility: CLINIC | Age: 62
End: 2022-10-07
Payer: COMMERCIAL

## 2022-10-07 ENCOUNTER — ANCILLARY PROCEDURE (OUTPATIENT)
Dept: CARDIOLOGY | Facility: CLINIC | Age: 62
End: 2022-10-07
Attending: INTERNAL MEDICINE
Payer: COMMERCIAL

## 2022-10-07 VITALS
SYSTOLIC BLOOD PRESSURE: 60 MMHG | OXYGEN SATURATION: 95 % | BODY MASS INDEX: 23.36 KG/M2 | HEIGHT: 65 IN | WEIGHT: 140.2 LBS

## 2022-10-07 DIAGNOSIS — Z95.811 LVAD (LEFT VENTRICULAR ASSIST DEVICE) PRESENT (H): ICD-10-CM

## 2022-10-07 DIAGNOSIS — I50.22 CHRONIC SYSTOLIC CONGESTIVE HEART FAILURE (H): Primary | ICD-10-CM

## 2022-10-07 DIAGNOSIS — I50.22 CHRONIC SYSTOLIC CONGESTIVE HEART FAILURE (H): ICD-10-CM

## 2022-10-07 DIAGNOSIS — I50.20 HEART FAILURE WITH REDUCED EJECTION FRACTION, NYHA CLASS III (H): ICD-10-CM

## 2022-10-07 DIAGNOSIS — I50.9 DECOMPENSATED HEART FAILURE (H): ICD-10-CM

## 2022-10-07 DIAGNOSIS — I25.5 ISCHEMIC CARDIOMYOPATHY: ICD-10-CM

## 2022-10-07 DIAGNOSIS — I25.10 CORONARY ARTERY DISEASE INVOLVING NATIVE CORONARY ARTERY OF NATIVE HEART WITHOUT ANGINA PECTORIS: ICD-10-CM

## 2022-10-07 DIAGNOSIS — Z95.811 LEFT VENTRICULAR ASSIST DEVICE PRESENT (H): ICD-10-CM

## 2022-10-07 LAB
6 MIN WALK (FT): 615 FT
6 MIN WALK (M): 187 M
ALBUMIN SERPL BCG-MCNC: 4.3 G/DL (ref 3.5–5.2)
ALP SERPL-CCNC: 125 U/L (ref 40–129)
ALT SERPL W P-5'-P-CCNC: 36 U/L (ref 10–50)
ANION GAP SERPL CALCULATED.3IONS-SCNC: 12 MMOL/L (ref 7–15)
AST SERPL W P-5'-P-CCNC: 27 U/L (ref 10–50)
BASOPHILS # BLD AUTO: 0 10E3/UL (ref 0–0.2)
BASOPHILS NFR BLD AUTO: 1 %
BILIRUB SERPL-MCNC: 0.4 MG/DL
BUN SERPL-MCNC: 12.8 MG/DL (ref 8–23)
CALCIUM SERPL-MCNC: 9.6 MG/DL (ref 8.8–10.2)
CHLORIDE SERPL-SCNC: 107 MMOL/L (ref 98–107)
CREAT SERPL-MCNC: 0.81 MG/DL (ref 0.67–1.17)
D DIMER PPP FEU-MCNC: 1.49 UG/ML FEU (ref 0–0.5)
DEPRECATED HCO3 PLAS-SCNC: 21 MMOL/L (ref 22–29)
EOSINOPHIL # BLD AUTO: 0.1 10E3/UL (ref 0–0.7)
EOSINOPHIL NFR BLD AUTO: 1 %
ERYTHROCYTE [DISTWIDTH] IN BLOOD BY AUTOMATED COUNT: 15.5 % (ref 10–15)
GFR SERPL CREATININE-BSD FRML MDRD: >90 ML/MIN/1.73M2
GLUCOSE SERPL-MCNC: 104 MG/DL (ref 70–99)
HCT VFR BLD AUTO: 34.9 % (ref 40–53)
HGB BLD-MCNC: 11.3 G/DL (ref 13.3–17.7)
IMM GRANULOCYTES # BLD: 0 10E3/UL
IMM GRANULOCYTES NFR BLD: 0 %
INR PPP: 2.58 (ref 0.85–1.15)
LDH SERPL L TO P-CCNC: 191 U/L (ref 0–250)
LYMPHOCYTES # BLD AUTO: 1.1 10E3/UL (ref 0.8–5.3)
LYMPHOCYTES NFR BLD AUTO: 16 %
MCH RBC QN AUTO: 29.8 PG (ref 26.5–33)
MCHC RBC AUTO-ENTMCNC: 32.4 G/DL (ref 31.5–36.5)
MCV RBC AUTO: 92 FL (ref 78–100)
MONOCYTES # BLD AUTO: 0.6 10E3/UL (ref 0–1.3)
MONOCYTES NFR BLD AUTO: 9 %
NEUTROPHILS # BLD AUTO: 4.9 10E3/UL (ref 1.6–8.3)
NEUTROPHILS NFR BLD AUTO: 73 %
NRBC # BLD AUTO: 0 10E3/UL
NRBC BLD AUTO-RTO: 0 /100
PLATELET # BLD AUTO: 220 10E3/UL (ref 150–450)
POTASSIUM SERPL-SCNC: 4.5 MMOL/L (ref 3.4–5.3)
PROT SERPL-MCNC: 7.9 G/DL (ref 6.4–8.3)
RBC # BLD AUTO: 3.79 10E6/UL (ref 4.4–5.9)
SODIUM SERPL-SCNC: 140 MMOL/L (ref 136–145)
WBC # BLD AUTO: 6.6 10E3/UL (ref 4–11)

## 2022-10-07 PROCEDURE — 36415 COLL VENOUS BLD VENIPUNCTURE: CPT | Performed by: PATHOLOGY

## 2022-10-07 PROCEDURE — 93750 INTERROGATION VAD IN PERSON: CPT | Performed by: INTERNAL MEDICINE

## 2022-10-07 PROCEDURE — G0463 HOSPITAL OUTPT CLINIC VISIT: HCPCS | Mod: 25

## 2022-10-07 PROCEDURE — 36415 COLL VENOUS BLD VENIPUNCTURE: CPT

## 2022-10-07 PROCEDURE — 93283 PRGRMG EVAL IMPLANTABLE DFB: CPT | Performed by: INTERNAL MEDICINE

## 2022-10-07 PROCEDURE — 99214 OFFICE O/P EST MOD 30 MIN: CPT | Mod: 25 | Performed by: INTERNAL MEDICINE

## 2022-10-07 PROCEDURE — 94618 PULMONARY STRESS TESTING: CPT | Performed by: INTERNAL MEDICINE

## 2022-10-07 PROCEDURE — 85610 PROTHROMBIN TIME: CPT | Performed by: PATHOLOGY

## 2022-10-07 PROCEDURE — 80053 COMPREHEN METABOLIC PANEL: CPT | Performed by: PATHOLOGY

## 2022-10-07 PROCEDURE — 85025 COMPLETE CBC W/AUTO DIFF WBC: CPT | Performed by: PATHOLOGY

## 2022-10-07 PROCEDURE — 83615 LACTATE (LD) (LDH) ENZYME: CPT | Performed by: INTERNAL MEDICINE

## 2022-10-07 PROCEDURE — 85379 FIBRIN DEGRADATION QUANT: CPT | Performed by: INTERNAL MEDICINE

## 2022-10-07 ASSESSMENT — PAIN SCALES - GENERAL: PAINLEVEL: NO PAIN (0)

## 2022-10-07 NOTE — PROGRESS NOTES
INR 2.58 drawn today while seeing the VAD team. No call made per INR done yesterday with dosing and return date. No changes to this and INR within goal range.     Riri Giron RN

## 2022-10-07 NOTE — PATIENT INSTRUCTIONS
Medications:  No changes    Instructions:  Hydrate!!     Follow-up: as previously scheduled.   1. Please follow-up with Dr. Lora on 10/13 months with labs prior.        Page the VAD Coordinator on call if you gain more than 3 lb in a day or 5 in a week. Please also page if you feel unwell or have alarms.   Great to see you in clinic today. To Page the VAD Coordinator on call, dial [phone] option #[phone] and ask to speak to the VAD coordinator on call.

## 2022-10-07 NOTE — NURSING NOTE
MCS VAD Pump Info     Row Name 10/07/22 1544             MCS VAD Information    Implant LVAD      LVAD Pump HeartMate 3              Heartmate 3 LEFT VS    Flow (Lpm) 3.1 Lpm      Pulse Index (PI) 3.9 PI      Speed (rpm) 5100 rpm      Power (persaud) 3.4 persaud      Current Hct setting 34.9      Retired: Unexpected Alarms --              Primary Controller    Serial number [id number]      Low flow alarm setting --      High watt alarm setting --      EBB: Patient use 5      Replace in 29 Months              Backup Controller    Serial number [id number]      EBB: Patient use 8      Replace EBB in 29 Months      Speed & HCT match primary controller --              VAD Interrogation    Alarms reported by patient N      Unexpected alarms noted upon interrogation None      PI events Frequent  PI 2.7-7.2, occasional speed drops. hx back 4.5hrs      Damage to equipment is noted N      Action taken Reviewed proper equipment care and maintenance              Driveline Exit Site    Dressing change done Y      Driveline properly secured Yes      DLES assessment c/d/i;rash  rash to lateral border. dry gauze applied.      Dressing used Daily kit      Frequency patient changes dressing Daily      Dressing modifications --      Dressing change supplier --                Pt with rash to lateral dressing. Educated applying skin prep to this area only and covering lightly with gauze so that no tape is in contact with skin.   Will provide weekly dressing change edu next week       4)  Education Complete: Yes   Charge the BACKUP controller s backup battery every 6 months  Perform a self test on BACKUP every 6 months  Change the MPU s batteries every 6 months:Yes  Have equipment serviced yearly (if applicable):Yes

## 2022-10-07 NOTE — LETTER
10/7/2022      RE: Dandy Sands  [address]  [address] [address]       Dear Colleague,    Thank you for the opportunity to participate in the care of your patient, Dandy Sands, at the [facility] [place] at [facility]. Please see a copy of my visit note below.    Cardiology Clinic Note    HPI  Mr. Dandy Sands is a pleasant 61 year old male with a past medical history of SLE, antiphospholipid syndrome, pulmonary embolism (on warfarin), ICM (EF 10-15%) s/p HM3 LVAD (7/28/22), and C. Diff who presents to clinic today for follow up.    I first met him May 2022.  He has brought in for consideration of elevated risk bypass surgery, however we elected to do to a multivessel PCI.  He subsequently returned a month later in cardiogenic shock from [place], with LFTs over thousand.  June 2022 we performed a evaluation for LVAD and transplant, and we had him at one point listed for transplant, however due to his elevated antibody levels associated with his lupus, we are unable to get a adequate donor offer, thus we proceeded with LVAD therapy as probable destination given his amount of antibodies.  His LVAD surgery was complicated by severe right ventricular failure requiring temporary RVAD.  He also had severe delirium and at one point tearing out his bridled nasogastric tube, causing a nasal septal perforation and severe nosebleeding.  He was discharged to rehab and then we presented with severe C. difficile diarrhea, dehydration, low flow alarms.  He has now been discharged from the hospital and this is his first outpatient appointment clinic within our system.    He presents with his son.  He has been staying at the local [facility].  He will still have occasional lightheaded and dizziness upon standing.  He no longer is having low flow alarms, flows generally get as low as 2.4 but are around 3.  Speed is set to 5100 and does have  a few associated speed drops.  He did have an episode of epistaxis when his INR get to 2.8.  He otherwise denies any chest pain or shortness of breath.  He does not have any lower extremity edema.    Cardiac Medications:  - Hydralazine 25 mg TID  - Warfarin  - Lisinopril 10 mg daily  - Digoxin 125 mcg every day  - Atorvastatin 40 mg nightly    PAST MEDICAL HISTORY:  Patient Active Problem List   Diagnosis     Decompensated heart failure (H)     Cardiogenic shock (H)     LVAD (left ventricular assist device) present (H)     Chronic systolic congestive heart failure (H)     Heart failure with reduced ejection fraction, NYHA class III (H)     Lightheadedness     Recurrent epistaxis     Epistaxis     Complication involving left ventricular assist device (LVAD)     Acute diarrhea     C. difficile diarrhea        FAMILY HISTORY:  Father with hypertension, mother with coronary disease requiring a 5 vessel CABG, sister with alcohol and drug use    SOCIAL HISTORY:  Former smoker, quit in 2009 after 25 pack years  Very rare alcohol use    ALLERGIES:  No Known Allergies    CURRENT MEDICATIONS:  Current Outpatient Medications   Medication Sig Dispense Refill     acetaminophen (TYLENOL) 325 MG tablet Take 3 tablets (975 mg) by mouth every 8 hours as needed for mild pain 270 tablet 0     atorvastatin (LIPITOR) 40 MG tablet Take 1 tablet (40 mg) by mouth every evening for 30 days 30 tablet 0     digoxin (LANOXIN) 125 MCG tablet Take 1 tablet (125 mcg) by mouth daily for 30 days 30 tablet 0     hydrALAZINE (APRESOLINE) 25 MG tablet Take 1 tablet (25 mg) by mouth 3 times daily 90 tablet 0     hydrocortisone 1 % CREA cream Place rectally 3 times daily 28 g 0     hydroxychloroquine (PLAQUENIL) 200 MG tablet Take 1 tablet (200 mg) by mouth 2 times daily 60 tablet 0     lisinopril (ZESTRIL) 10 MG tablet Take 1 tablet (10 mg) by mouth daily for 30 days 30 tablet 0     multivitamin w/minerals (THERA-VIT-M) tablet Take 1 tablet by mouth  "daily  0     MYFORTIC (BRAND) 180 MG EC tablet Take 1 tablet (180 mg) by mouth 2 times daily 149 tablet 0     pramox-pe-glycerin-petrolatum (PREPARATION H) 1-0.25-14.4-15 % CREA cream Place rectally 2 times daily as needed for hemorrhoids Apply immediately after a bowel movement. 51 g 0     promethazine (PHENERGAN) 12.5 MG tablet Take 1 tablet (12.5 mg) by mouth every 6 hours as needed for nausea or vomiting 30 tablet 1     saline nasal (AYR SALINE) GEL topical gel Apply into each nare At Bedtime       sodium chloride (OCEAN) 0.65 % nasal spray Spray 2 sprays into both nostrils every 4 hours (while awake)       tamsulosin (FLOMAX) 0.4 MG capsule Take 0.4 mg by mouth daily       thiamine (B-1) 100 MG tablet Take 1 tablet (100 mg) by mouth daily 30 tablet 0     warfarin ANTICOAGULANT (COUMADIN) 2.5 MG tablet Take 2 tabs (5mg) daily at bedtime. Future dose adjustments pending INR 72 tablet 0     methylcellulose (CITRUCEL) 500 MG TABS tablet Take 2 tablets (1,000 mg) by mouth 2 times daily (Patient not taking: Reported on 10/7/2022) 120 tablet 0     QUEtiapine (SEROQUEL) 25 MG tablet Take 1 tablet (25 mg) by mouth 2 times daily for 30 days 60 tablet 0     QUEtiapine (SEROQUEL) 50 MG tablet Take 3 tablets (150 mg) by mouth At Bedtime for 30 days 90 tablet 0     sertraline (ZOLOFT) 25 MG tablet Take 3 tablets (75 mg) by mouth daily (Patient not taking: Reported on 10/7/2022) 90 tablet 0     zolpidem (AMBIEN) 5 MG tablet Take 5 mg by mouth nightly as needed for sleep (Patient not taking: Reported on 10/7/2022)         ROS:   10-point ROS negative except for H&P    EXAM:  BP (!) 60/0 (BP Location: Right arm, Patient Position: Chair, Cuff Size: Adult Regular)   Ht 1.659 m (5' 5.32\")   Wt 63.6 kg (140 lb 3.2 oz)   SpO2 95%   BMI 23.11 kg/m      General: appears comfortable, alert and articulate  Head: normocephalic, atraumatic  Eyes: anicteric sclera, EOMI  Heart: LVAD hum, JVP 7 cm  Lungs: clear, no rales or " wheezing  Abdomen: soft  Extremities: no edema  Neurological: normal speech and affect, no gross motor deficits    Weight  Wt Readings from Last 10 Encounters:   10/07/22 63.6 kg (140 lb 3.2 oz)   09/29/22 60.8 kg (134 lb 0.6 oz)   09/15/22 61.2 kg (134 lb 14.7 oz)   09/04/22 59.1 kg (130 lb 6.4 oz)   08/21/22 59.3 kg (130 lb 12.8 oz)   07/08/22 62.6 kg (138 lb)   06/30/22 65.8 kg (145 lb)   05/29/22 65 kg (143 lb 4.8 oz)   05/26/22 64.6 kg (142 lb 8 oz)       Labs:    CBC RESULTS:  Lab Results   Component Value Date    WBC 6.6 10/07/2022    RBC 3.79 (L) 10/07/2022    HGB 11.3 (L) 10/07/2022    HCT 34.9 (L) 10/07/2022    MCV 92 10/07/2022    MCH 29.8 10/07/2022    MCHC 32.4 10/07/2022    RDW 15.5 (H) 10/07/2022     10/07/2022       CMP RESULTS:  Lab Results   Component Value Date     10/07/2022    POTASSIUM 4.5 10/07/2022    POTASSIUM 3.9 09/01/2022    POTASSIUM 5.2 07/09/2022    CHLORIDE 107 10/07/2022    CHLORIDE 112 (H) 09/01/2022    CO2 21 (L) 10/07/2022    CO2 22 09/01/2022    ANIONGAP 12 10/07/2022    ANIONGAP 6 09/01/2022     (H) 10/07/2022    GLC 94 09/01/2022    BUN 12.8 10/07/2022    BUN 13 09/01/2022    CR 0.81 10/07/2022    GFRESTIMATED >90 10/07/2022    ELDA 9.6 10/07/2022    BILITOTAL 0.4 10/07/2022    ALBUMIN 4.3 10/07/2022    ALBUMIN 3.1 (L) 09/01/2022    ALKPHOS 125 10/07/2022    ALT 36 10/07/2022    AST 27 10/07/2022        Testing/Procedures:  I personally visualized and interpreted:    TTE 9/23/22:  Interpretation Summary  HM3 LVAD is present but not well seen on today's study.     Left ventricular chamber size is small, LVIDd = 3.9 cm  Left ventricular function is decreased. The ejection fraction is 15-20%  (severely reduced).  Global right ventricular function is normal.  The aortic valve remains closed during the cardiac cycle. There is mild  continuous aortic regurgitation.  No pericardial effusion is present.     LV cavity size is smaller with cavity obliteration in  standing position ,  LVIDd = 3.0 cm    EKG 9/20/22:  Sinus with PVCs, RBBB    RHC 9/23/22:  RA 10/11/8 mmHg  RV 19/8 mmHg  PA 18/10/13 mmHg  CWP 5/4/4 mmHg  CO (Marley) 3.7 L/min  CI (Marley) 2.2 L/min/m2  CO (TD) 3.27 L/min  CI (TD) 1.94 L/min/m2    MAP: 62  SVR: 1321 (using TD CO)  PVR 2.8 KNOWLES (using TD CO)    Due to low filling pressures and complaints of low flow alarms patient was given 1 Liter of saline bolus to reassess filling pressures.    RA 17/17/13 mmHg  RV 21/10 mmHg  PA 21/13/16 mmHg  CWP 11/10/9 mmHg  CO (Marley) 3.4 L/min  CI (Marley) 2.01 L/min/m2    MAP 62 mmHg   SVR 1153 dynes (using Marley CO)  PVR 2.1 KNOWLES (using Marley CO)     Assessment and Plan:   In summary, Mr. Dandy Sands is a pleasant 61 year old male with a past medical history of SLE, antiphospholipid syndrome, pulmonary embolism (on warfarin), ICM (EF 10-15%) s/p HM3 LVAD (7/28/22), and C. Diff who presents to clinic today for follow up.    Chronic systolic heart failure/HFrEF (EF 10-15%) secondary to ischemic cardiomyopathy  NYHA Symptom Class III  Stage D  ACE-I/ARB/ARNi: Lisinopril 10 mg daily  He is on hydralazine, eventual transition completely to lisinopril  Not on beta-blocker due to severe RV failure, hypotension  Aldosterone antagonist has not been started yet  SGLT2i: None due to recurrent hypokalemia  SCD prophylaxis is an ICD  Fluid status hypovolemic  Diuretic: None  Cardiac Rehab: Referral previously placed    Follow-up next week with me    LVAD speed 5100, frequent speed drops, PI's ranged from 2.47, flows generally in the 2.5-3 range  Warfarin to maintain INR goal of 2-3  Currently off aspirin due to recurrent epistaxis    #SLE  #antiphospholipid syndrome  #pulmonary embolism:  - Continues on warfarin  - Recently started on myfortic 540 mg BID, I think can be switched back to MMF given that his diarrheal symptoms are not from MMF colitis  - Follow up with Rheum    #c diff  #diarrhea:  - Improved; completed vanc    Follow-up in 1  week    The patient states understanding and is agreeable with plan.   Feel free to contact myself regarding questions or concerns.  It was a pleasure to see this patient today.    Kelly Lora MD   of Medicine  Advanced Heart Failure and Transplant Cardiology

## 2022-10-07 NOTE — NURSING NOTE
Chief Complaint   Patient presents with     Follow Up     3 month post vad       Vitals were taken and medications reconciled.    Cabrera Ramirez, EMT  3:18 PM

## 2022-10-07 NOTE — PATIENT INSTRUCTIONS
Next automatic remote ICD check is scheduled for 1/12/2023.  RTC in 6 months. It was a pleasure to see you in clinic today    Brenda Degroot RN    Electrophysiology Nurse Clinician  [facility]    During Business Hours Please Call:  [phone]  After Hours Please Call:  [phone] - select option #4 and ask for job code [phone]

## 2022-10-08 LAB
MDC_IDC_LEAD_IMPLANT_DT: NORMAL
MDC_IDC_LEAD_IMPLANT_DT: NORMAL
MDC_IDC_LEAD_LOCATION: NORMAL
MDC_IDC_LEAD_LOCATION: NORMAL
MDC_IDC_LEAD_LOCATION_DETAIL_1: NORMAL
MDC_IDC_LEAD_LOCATION_DETAIL_1: NORMAL
MDC_IDC_LEAD_MFG: NORMAL
MDC_IDC_LEAD_MFG: NORMAL
MDC_IDC_LEAD_MODEL: NORMAL
MDC_IDC_LEAD_MODEL: NORMAL
MDC_IDC_LEAD_POLARITY_TYPE: NORMAL
MDC_IDC_LEAD_POLARITY_TYPE: NORMAL
MDC_IDC_LEAD_SERIAL: NORMAL
MDC_IDC_LEAD_SERIAL: NORMAL
MDC_IDC_MSMT_BATTERY_DTM: NORMAL
MDC_IDC_MSMT_BATTERY_REMAINING_LONGEVITY: 50 MO
MDC_IDC_MSMT_BATTERY_RRT_TRIGGER: 2.73
MDC_IDC_MSMT_BATTERY_STATUS: NORMAL
MDC_IDC_MSMT_BATTERY_VOLTAGE: 2.96 V
MDC_IDC_MSMT_CAP_CHARGE_DTM: NORMAL
MDC_IDC_MSMT_CAP_CHARGE_ENERGY: 18 J
MDC_IDC_MSMT_CAP_CHARGE_TIME: 3.94
MDC_IDC_MSMT_CAP_CHARGE_TYPE: NORMAL
MDC_IDC_MSMT_LEADCHNL_RA_IMPEDANCE_VALUE: 361 OHM
MDC_IDC_MSMT_LEADCHNL_RA_PACING_THRESHOLD_AMPLITUDE: 0.5 V
MDC_IDC_MSMT_LEADCHNL_RA_PACING_THRESHOLD_PULSEWIDTH: 0.4 MS
MDC_IDC_MSMT_LEADCHNL_RA_SENSING_INTR_AMPL: 0.88 MV
MDC_IDC_MSMT_LEADCHNL_RV_IMPEDANCE_VALUE: 589 OHM
MDC_IDC_MSMT_LEADCHNL_RV_IMPEDANCE_VALUE: 722 OHM
MDC_IDC_MSMT_LEADCHNL_RV_PACING_THRESHOLD_AMPLITUDE: 2.75 V
MDC_IDC_MSMT_LEADCHNL_RV_PACING_THRESHOLD_PULSEWIDTH: 0.4 MS
MDC_IDC_MSMT_LEADCHNL_RV_SENSING_INTR_AMPL: 1.4 MV
MDC_IDC_PG_IMPLANT_DTM: NORMAL
MDC_IDC_PG_MFG: NORMAL
MDC_IDC_PG_MODEL: NORMAL
MDC_IDC_PG_SERIAL: NORMAL
MDC_IDC_PG_TYPE: NORMAL
MDC_IDC_SESS_CLINIC_NAME: NORMAL
MDC_IDC_SESS_DTM: NORMAL
MDC_IDC_SESS_TYPE: NORMAL
MDC_IDC_SET_BRADY_AT_MODE_SWITCH_RATE: 171 {BEATS}/MIN
MDC_IDC_SET_BRADY_HYSTRATE: NORMAL
MDC_IDC_SET_BRADY_LOWRATE: 50 {BEATS}/MIN
MDC_IDC_SET_BRADY_MAX_SENSOR_RATE: 115 {BEATS}/MIN
MDC_IDC_SET_BRADY_MAX_TRACKING_RATE: 130 {BEATS}/MIN
MDC_IDC_SET_BRADY_MODE: NORMAL
MDC_IDC_SET_BRADY_PAV_DELAY_LOW: 350 MS
MDC_IDC_SET_BRADY_SAV_DELAY_LOW: 350 MS
MDC_IDC_SET_LEADCHNL_RA_PACING_AMPLITUDE: 1.5 V
MDC_IDC_SET_LEADCHNL_RA_PACING_ANODE_ELECTRODE_1: NORMAL
MDC_IDC_SET_LEADCHNL_RA_PACING_ANODE_LOCATION_1: NORMAL
MDC_IDC_SET_LEADCHNL_RA_PACING_CAPTURE_MODE: NORMAL
MDC_IDC_SET_LEADCHNL_RA_PACING_CATHODE_ELECTRODE_1: NORMAL
MDC_IDC_SET_LEADCHNL_RA_PACING_CATHODE_LOCATION_1: NORMAL
MDC_IDC_SET_LEADCHNL_RA_PACING_POLARITY: NORMAL
MDC_IDC_SET_LEADCHNL_RA_PACING_PULSEWIDTH: 0.4 MS
MDC_IDC_SET_LEADCHNL_RA_SENSING_ANODE_ELECTRODE_1: NORMAL
MDC_IDC_SET_LEADCHNL_RA_SENSING_ANODE_LOCATION_1: NORMAL
MDC_IDC_SET_LEADCHNL_RA_SENSING_CATHODE_ELECTRODE_1: NORMAL
MDC_IDC_SET_LEADCHNL_RA_SENSING_CATHODE_LOCATION_1: NORMAL
MDC_IDC_SET_LEADCHNL_RA_SENSING_POLARITY: NORMAL
MDC_IDC_SET_LEADCHNL_RA_SENSING_SENSITIVITY: 0.15 MV
MDC_IDC_SET_LEADCHNL_RV_PACING_AMPLITUDE: 5 V
MDC_IDC_SET_LEADCHNL_RV_PACING_ANODE_ELECTRODE_1: NORMAL
MDC_IDC_SET_LEADCHNL_RV_PACING_ANODE_LOCATION_1: NORMAL
MDC_IDC_SET_LEADCHNL_RV_PACING_CAPTURE_MODE: NORMAL
MDC_IDC_SET_LEADCHNL_RV_PACING_CATHODE_ELECTRODE_1: NORMAL
MDC_IDC_SET_LEADCHNL_RV_PACING_CATHODE_LOCATION_1: NORMAL
MDC_IDC_SET_LEADCHNL_RV_PACING_POLARITY: NORMAL
MDC_IDC_SET_LEADCHNL_RV_PACING_PULSEWIDTH: 1 MS
MDC_IDC_SET_LEADCHNL_RV_SENSING_ANODE_ELECTRODE_1: NORMAL
MDC_IDC_SET_LEADCHNL_RV_SENSING_ANODE_LOCATION_1: NORMAL
MDC_IDC_SET_LEADCHNL_RV_SENSING_CATHODE_ELECTRODE_1: NORMAL
MDC_IDC_SET_LEADCHNL_RV_SENSING_CATHODE_LOCATION_1: NORMAL
MDC_IDC_SET_LEADCHNL_RV_SENSING_POLARITY: NORMAL
MDC_IDC_SET_LEADCHNL_RV_SENSING_SENSITIVITY: 0.3 MV
MDC_IDC_SET_ZONE_DETECTION_BEATS_DENOMINATOR: 40 {BEATS}
MDC_IDC_SET_ZONE_DETECTION_BEATS_NUMERATOR: 30 {BEATS}
MDC_IDC_SET_ZONE_DETECTION_INTERVAL: 280 MS
MDC_IDC_SET_ZONE_DETECTION_INTERVAL: 320 MS
MDC_IDC_SET_ZONE_DETECTION_INTERVAL: 350 MS
MDC_IDC_SET_ZONE_DETECTION_INTERVAL: 350 MS
MDC_IDC_SET_ZONE_DETECTION_INTERVAL: 400 MS
MDC_IDC_SET_ZONE_TYPE: NORMAL
MDC_IDC_STAT_AT_BURDEN_PERCENT: 0 %
MDC_IDC_STAT_AT_DTM_END: NORMAL
MDC_IDC_STAT_AT_DTM_START: NORMAL
MDC_IDC_STAT_BRADY_AP_VP_PERCENT: 0 %
MDC_IDC_STAT_BRADY_AP_VS_PERCENT: 0.04 %
MDC_IDC_STAT_BRADY_AS_VP_PERCENT: 0.09 %
MDC_IDC_STAT_BRADY_AS_VS_PERCENT: 99.86 %
MDC_IDC_STAT_BRADY_DTM_END: NORMAL
MDC_IDC_STAT_BRADY_DTM_START: NORMAL
MDC_IDC_STAT_BRADY_RA_PERCENT_PACED: 0.05 %
MDC_IDC_STAT_BRADY_RV_PERCENT_PACED: 0.09 %
MDC_IDC_STAT_EPISODE_RECENT_COUNT: 0
MDC_IDC_STAT_EPISODE_RECENT_COUNT_DTM_END: NORMAL
MDC_IDC_STAT_EPISODE_RECENT_COUNT_DTM_START: NORMAL
MDC_IDC_STAT_EPISODE_TOTAL_COUNT: 0
MDC_IDC_STAT_EPISODE_TOTAL_COUNT: 1
MDC_IDC_STAT_EPISODE_TOTAL_COUNT: 1
MDC_IDC_STAT_EPISODE_TOTAL_COUNT: 34
MDC_IDC_STAT_EPISODE_TOTAL_COUNT: 9
MDC_IDC_STAT_EPISODE_TOTAL_COUNT_DTM_END: NORMAL
MDC_IDC_STAT_EPISODE_TOTAL_COUNT_DTM_START: NORMAL
MDC_IDC_STAT_EPISODE_TYPE: NORMAL
MDC_IDC_STAT_TACHYTHERAPY_ATP_DELIVERED_RECENT: 0
MDC_IDC_STAT_TACHYTHERAPY_ATP_DELIVERED_TOTAL: 0
MDC_IDC_STAT_TACHYTHERAPY_RECENT_DTM_END: NORMAL
MDC_IDC_STAT_TACHYTHERAPY_RECENT_DTM_START: NORMAL
MDC_IDC_STAT_TACHYTHERAPY_SHOCKS_ABORTED_RECENT: 0
MDC_IDC_STAT_TACHYTHERAPY_SHOCKS_ABORTED_TOTAL: 0
MDC_IDC_STAT_TACHYTHERAPY_SHOCKS_DELIVERED_RECENT: 0
MDC_IDC_STAT_TACHYTHERAPY_SHOCKS_DELIVERED_TOTAL: 1
MDC_IDC_STAT_TACHYTHERAPY_TOTAL_DTM_END: NORMAL
MDC_IDC_STAT_TACHYTHERAPY_TOTAL_DTM_START: NORMAL

## 2022-10-10 ENCOUNTER — ANTICOAGULATION THERAPY VISIT (OUTPATIENT)
Dept: ANTICOAGULATION | Facility: CLINIC | Age: 62
End: 2022-10-10

## 2022-10-10 DIAGNOSIS — Z95.811 LEFT VENTRICULAR ASSIST DEVICE PRESENT (H): ICD-10-CM

## 2022-10-10 DIAGNOSIS — Z95.811 LVAD (LEFT VENTRICULAR ASSIST DEVICE) PRESENT (H): ICD-10-CM

## 2022-10-10 DIAGNOSIS — I50.22 CHRONIC SYSTOLIC CONGESTIVE HEART FAILURE (H): Primary | ICD-10-CM

## 2022-10-10 DIAGNOSIS — I50.20 HEART FAILURE WITH REDUCED EJECTION FRACTION, NYHA CLASS III (H): ICD-10-CM

## 2022-10-10 LAB
FIO2-PRE: 21 %
INR (EXTERNAL): 2.8 (ref 0.9–1.1)

## 2022-10-10 NOTE — PROGRESS NOTES
ANTICOAGULATION MANAGEMENT     Dandy Sands 61 year old male is on warfarin with therapeutic INR result. (Goal INR 2.0-3.0)    Recent labs: (last 7 days)     10/10/22  0000   INR 2.8*       ASSESSMENT       Source(s): Chart Review, Patient/Caregiver Call and Home Care/Facility Nurse       Warfarin doses taken: Less warfarin taken than planned which may be affecting INR.  Dandy reports taking 5 mg of warfarin F; and 7.5 mg all other days of the week.    Diet: No new diet changes identified    New illness, injury, or hospitalization: No    Medication/supplement changes: None noted    Signs or symptoms of bleeding or clotting: No    Previous INR: Therapeutic last 2(+) visits    Additional findings: Please fax INR result and the next INR date to [facility] on 10/13/22--he has a clinic appointment Fax #:  [phone].  Home care is planning to see patient again on 10/18/22       PLAN     Recommended plan for no diet, medication or health factor changes affecting INR     Dosing Instructions: Continue your current warfarin dose with next INR in 3 days.  This is the warfarin dose they report Dandy taking the last week.       Summary  As of 10/10/2022    Full warfarin instructions:  5 mg every Fri; 7.5 mg all other days   Next INR check:  10/13/2022             [facility] who agrees to plan and repeated back plan correctly      Check at provider office visit.  Please fax INR result and the next INR date to [facility] on 10/13/22--he has a clinic appointment 10/13. Fax #:  [phone].  Home care is planning to see patient again on 10/18/22       Education provided: Please call back if any changes to your diet, medications or how you've been taking warfarin and Contact [phone] with any changes, questions or concerns.     Plan made per [facility] anticoagulation protocol and per LVAD protocol    Leatha Abbott RN  Anticoagulation  Clinic  10/10/2022    _______________________________________________________________________     Anticoagulation Episode Summary     Current INR goal:  2.0-3.0   TTR:  100.0 % (1.6 wk)   Target end date:  Indefinite   Send INR reminders to:  ANTICOAG LVAD    Indications    Chronic systolic congestive heart failure (H) [I50.22]  LVAD (left ventricular assist device) present (H) [Z95.811]  Heart failure with reduced ejection fraction  NYHA class III (H) [I50.20]  Left ventricular assist device present (H) [Z95.811]           Comments:  Follow VAD Anticoag protocol:Yes: HeartMate 3   Bridging: No bridging epistaxis.   Date VAD placed: 7/8/22         Anticoagulation Care Providers     Provider Role Specialty Phone number    Kelly Lora MD Referring Cardiovascular Disease [phone]

## 2022-10-11 DIAGNOSIS — Z79.899 LONG TERM USE OF DRUG: ICD-10-CM

## 2022-10-11 DIAGNOSIS — I50.22 CHRONIC SYSTOLIC CONGESTIVE HEART FAILURE (H): ICD-10-CM

## 2022-10-11 DIAGNOSIS — Z95.811 LEFT VENTRICULAR ASSIST DEVICE PRESENT (H): ICD-10-CM

## 2022-10-12 ENCOUNTER — ANTICOAGULATION THERAPY VISIT (OUTPATIENT)
Dept: ANTICOAGULATION | Facility: CLINIC | Age: 62
End: 2022-10-12

## 2022-10-12 DIAGNOSIS — I50.20 HEART FAILURE WITH REDUCED EJECTION FRACTION, NYHA CLASS III (H): ICD-10-CM

## 2022-10-12 DIAGNOSIS — I50.22 CHRONIC SYSTOLIC CONGESTIVE HEART FAILURE (H): Primary | ICD-10-CM

## 2022-10-12 DIAGNOSIS — Z95.811 LEFT VENTRICULAR ASSIST DEVICE PRESENT (H): ICD-10-CM

## 2022-10-12 DIAGNOSIS — Z95.811 LVAD (LEFT VENTRICULAR ASSIST DEVICE) PRESENT (H): ICD-10-CM

## 2022-10-12 LAB — INR (EXTERNAL): 2.1 (ref 0.9–1.1)

## 2022-10-12 NOTE — PROGRESS NOTES
ANTICOAGULATION MANAGEMENT     Dandy Sands 61 year old male is on warfarin with therapeutic INR result. (Goal INR 2.0-3.0)    Recent labs: (last 7 days)     10/12/22  0000   INR 2.1*       ASSESSMENT       Source(s): Chart Review, Patient/Caregiver Call and Home Care/Facility Nurse       Warfarin doses taken: Warfarin taken as instructed    Diet: No new diet changes identified    New illness, injury, or hospitalization: No    Medication/supplement changes: None noted    Signs or symptoms of bleeding or clotting: No    Previous INR: Therapeutic last 2(+) visits    Additional findings: INR went from 2.8 to 2.1 in 2 days. Writer suggested a 10mg dose today but patient prefers not to take that dose and wants to continue with 7.5mg daily. Writer explains with a new VAD it's important to be well anticoagulated.  Patient is afraid of epistaxis.        PLAN     Recommended plan for no diet, medication or health factor changes affecting INR     Dosing Instructions: Continue your current warfarin dose with next INR in 2 days       Summary  As of 10/12/2022    Full warfarin instructions:  7.5 mg every day   Next INR check:  10/13/2022             Telephone call with  Radha HERCULES and patient.  who verbalizes understanding and agrees to plan and who agrees to plan and repeated back plan correctly    Patient to recheck with home meter    Education provided: Goal range and significance of current result, Importance of therapeutic range, Importance of following up at instructed interval and Importance of taking warfarin as instructed    Plan made per [facility] anticoagulation protocol and per LVAD protocol    Holli Silva RN  Anticoagulation Clinic  10/12/2022    _______________________________________________________________________     Anticoagulation Episode Summary     Current INR goal:  2.0-3.0   TTR:  100.0 % (1.9 wk)   Target end date:  Indefinite   Send INR reminders to:  [facility] LVAD    Indications    Chronic systolic  congestive heart failure (H) [I50.22]  LVAD (left ventricular assist device) present (H) [Z95.811]  Heart failure with reduced ejection fraction  NYHA class III (H) [I50.20]  Left ventricular assist device present (H) [Z95.811]           Comments:  Follow VAD Anticoag protocol:Yes: HeartMate 3   Bridging: No bridging epistaxis.   Date VAD placed: 7/8/22         Anticoagulation Care Providers     Provider Role Specialty Phone number    Kelly Lora MD Referring Cardiovascular Disease [phone]

## 2022-10-13 ENCOUNTER — HOSPITAL ENCOUNTER (INPATIENT)
Facility: CLINIC | Age: 62
LOS: 4 days | Discharge: HOME | DRG: 177 | End: 2022-10-18
Attending: EMERGENCY MEDICINE | Admitting: STUDENT IN AN ORGANIZED HEALTH CARE EDUCATION/TRAINING PROGRAM
Payer: COMMERCIAL

## 2022-10-13 ENCOUNTER — MYC MEDICAL ADVICE (OUTPATIENT)
Dept: CARDIOLOGY | Facility: CLINIC | Age: 62
End: 2022-10-13

## 2022-10-13 ENCOUNTER — CARE COORDINATION (OUTPATIENT)
Dept: CARDIOLOGY | Facility: CLINIC | Age: 62
End: 2022-10-13

## 2022-10-13 ENCOUNTER — APPOINTMENT (OUTPATIENT)
Dept: GENERAL RADIOLOGY | Facility: CLINIC | Age: 62
DRG: 177 | End: 2022-10-13
Attending: EMERGENCY MEDICINE
Payer: COMMERCIAL

## 2022-10-13 DIAGNOSIS — N40.1 BENIGN PROSTATIC HYPERPLASIA WITH LOWER URINARY TRACT SYMPTOMS, SYMPTOM DETAILS UNSPECIFIED: ICD-10-CM

## 2022-10-13 DIAGNOSIS — K64.4 EXTERNAL HEMORRHOIDS: ICD-10-CM

## 2022-10-13 DIAGNOSIS — M32.9 SYSTEMIC LUPUS ERYTHEMATOSUS, UNSPECIFIED SLE TYPE, UNSPECIFIED ORGAN INVOLVEMENT STATUS (H): Primary | ICD-10-CM

## 2022-10-13 DIAGNOSIS — Z95.811 LEFT VENTRICULAR ASSIST DEVICE PRESENT (H): ICD-10-CM

## 2022-10-13 DIAGNOSIS — E46 MALNUTRITION, UNSPECIFIED TYPE (H): ICD-10-CM

## 2022-10-13 DIAGNOSIS — Z79.01 LONG TERM (CURRENT) USE OF ANTICOAGULANTS: ICD-10-CM

## 2022-10-13 DIAGNOSIS — G47.00 INSOMNIA, UNSPECIFIED TYPE: ICD-10-CM

## 2022-10-13 DIAGNOSIS — F32.9 MAJOR DEPRESSIVE DISORDER WITH CURRENT ACTIVE EPISODE, UNSPECIFIED DEPRESSION EPISODE SEVERITY, UNSPECIFIED WHETHER RECURRENT: ICD-10-CM

## 2022-10-13 DIAGNOSIS — U07.1 COVID-19: ICD-10-CM

## 2022-10-13 DIAGNOSIS — R11.2 NAUSEA AND VOMITING, UNSPECIFIED VOMITING TYPE: ICD-10-CM

## 2022-10-13 DIAGNOSIS — I50.20 HEART FAILURE WITH REDUCED EJECTION FRACTION, NYHA CLASS III (H): ICD-10-CM

## 2022-10-13 DIAGNOSIS — R19.7 DIARRHEA, UNSPECIFIED TYPE: ICD-10-CM

## 2022-10-13 DIAGNOSIS — E83.42 HYPOMAGNESEMIA: ICD-10-CM

## 2022-10-13 DIAGNOSIS — Z95.811 LVAD (LEFT VENTRICULAR ASSIST DEVICE) PRESENT (H): ICD-10-CM

## 2022-10-13 DIAGNOSIS — R68.83 CHILLS: ICD-10-CM

## 2022-10-13 DIAGNOSIS — I50.9 DECOMPENSATED HEART FAILURE (H): ICD-10-CM

## 2022-10-13 DIAGNOSIS — U07.1 COVID: ICD-10-CM

## 2022-10-13 LAB
ALBUMIN SERPL BCG-MCNC: 4.2 G/DL (ref 3.5–5.2)
ALP SERPL-CCNC: 100 U/L (ref 40–129)
ALT SERPL W P-5'-P-CCNC: 19 U/L (ref 10–50)
ANION GAP SERPL CALCULATED.3IONS-SCNC: 15 MMOL/L (ref 7–15)
AST SERPL W P-5'-P-CCNC: 26 U/L (ref 10–50)
BASOPHILS # BLD AUTO: 0 10E3/UL (ref 0–0.2)
BASOPHILS NFR BLD AUTO: 0 %
BILIRUB SERPL-MCNC: 0.7 MG/DL
BUN SERPL-MCNC: 10.1 MG/DL (ref 8–23)
CALCIUM SERPL-MCNC: 9.1 MG/DL (ref 8.8–10.2)
CHLORIDE SERPL-SCNC: 102 MMOL/L (ref 98–107)
CREAT SERPL-MCNC: 0.72 MG/DL (ref 0.67–1.17)
DEPRECATED HCO3 PLAS-SCNC: 19 MMOL/L (ref 22–29)
EOSINOPHIL # BLD AUTO: 0.1 10E3/UL (ref 0–0.7)
EOSINOPHIL NFR BLD AUTO: 1 %
ERYTHROCYTE [DISTWIDTH] IN BLOOD BY AUTOMATED COUNT: 15.8 % (ref 10–15)
GFR SERPL CREATININE-BSD FRML MDRD: >90 ML/MIN/1.73M2
GLUCOSE SERPL-MCNC: 93 MG/DL (ref 70–99)
HCT VFR BLD AUTO: 36.5 % (ref 40–53)
HGB BLD-MCNC: 11.5 G/DL (ref 13.3–17.7)
HOLD SPECIMEN: NORMAL
IMM GRANULOCYTES # BLD: 0.1 10E3/UL
IMM GRANULOCYTES NFR BLD: 0 %
LIPASE SERPL-CCNC: 69 U/L (ref 13–60)
LYMPHOCYTES # BLD AUTO: 1.9 10E3/UL (ref 0.8–5.3)
LYMPHOCYTES NFR BLD AUTO: 17 %
MCH RBC QN AUTO: 29.6 PG (ref 26.5–33)
MCHC RBC AUTO-ENTMCNC: 31.5 G/DL (ref 31.5–36.5)
MCV RBC AUTO: 94 FL (ref 78–100)
MONOCYTES # BLD AUTO: 0.7 10E3/UL (ref 0–1.3)
MONOCYTES NFR BLD AUTO: 6 %
NEUTROPHILS # BLD AUTO: 8.8 10E3/UL (ref 1.6–8.3)
NEUTROPHILS NFR BLD AUTO: 76 %
NRBC # BLD AUTO: 0 10E3/UL
NRBC BLD AUTO-RTO: 0 /100
PLATELET # BLD AUTO: 203 10E3/UL (ref 150–450)
POTASSIUM SERPL-SCNC: 4 MMOL/L (ref 3.4–5.3)
PROT SERPL-MCNC: 7.7 G/DL (ref 6.4–8.3)
RBC # BLD AUTO: 3.89 10E6/UL (ref 4.4–5.9)
SODIUM SERPL-SCNC: 136 MMOL/L (ref 136–145)
WBC # BLD AUTO: 11.6 10E3/UL (ref 4–11)

## 2022-10-13 PROCEDURE — 83615 LACTATE (LD) (LDH) ENZYME: CPT

## 2022-10-13 PROCEDURE — 71045 X-RAY EXAM CHEST 1 VIEW: CPT | Mod: 26 | Performed by: RADIOLOGY

## 2022-10-13 PROCEDURE — 250N000011 HC RX IP 250 OP 636: Performed by: EMERGENCY MEDICINE

## 2022-10-13 PROCEDURE — 83735 ASSAY OF MAGNESIUM: CPT

## 2022-10-13 PROCEDURE — 86140 C-REACTIVE PROTEIN: CPT

## 2022-10-13 PROCEDURE — 36415 COLL VENOUS BLD VENIPUNCTURE: CPT | Performed by: EMERGENCY MEDICINE

## 2022-10-13 PROCEDURE — 93010 ELECTROCARDIOGRAM REPORT: CPT | Performed by: EMERGENCY MEDICINE

## 2022-10-13 PROCEDURE — 258N000003 HC RX IP 258 OP 636: Performed by: EMERGENCY MEDICINE

## 2022-10-13 PROCEDURE — 93005 ELECTROCARDIOGRAM TRACING: CPT | Performed by: EMERGENCY MEDICINE

## 2022-10-13 PROCEDURE — 99285 EMERGENCY DEPT VISIT HI MDM: CPT | Mod: CS,25 | Performed by: EMERGENCY MEDICINE

## 2022-10-13 PROCEDURE — 83690 ASSAY OF LIPASE: CPT | Performed by: EMERGENCY MEDICINE

## 2022-10-13 PROCEDURE — 87637 SARSCOV2&INF A&B&RSV AMP PRB: CPT | Performed by: EMERGENCY MEDICINE

## 2022-10-13 PROCEDURE — 85025 COMPLETE CBC W/AUTO DIFF WBC: CPT | Performed by: EMERGENCY MEDICINE

## 2022-10-13 PROCEDURE — 96375 TX/PRO/DX INJ NEW DRUG ADDON: CPT | Performed by: EMERGENCY MEDICINE

## 2022-10-13 PROCEDURE — 83880 ASSAY OF NATRIURETIC PEPTIDE: CPT

## 2022-10-13 PROCEDURE — 99285 EMERGENCY DEPT VISIT HI MDM: CPT | Mod: CS | Performed by: EMERGENCY MEDICINE

## 2022-10-13 PROCEDURE — C9803 HOPD COVID-19 SPEC COLLECT: HCPCS | Performed by: EMERGENCY MEDICINE

## 2022-10-13 PROCEDURE — 80053 COMPREHEN METABOLIC PANEL: CPT | Performed by: EMERGENCY MEDICINE

## 2022-10-13 PROCEDURE — 85379 FIBRIN DEGRADATION QUANT: CPT

## 2022-10-13 PROCEDURE — 85610 PROTHROMBIN TIME: CPT | Performed by: EMERGENCY MEDICINE

## 2022-10-13 PROCEDURE — 999N000248 HC STATISTIC IV INSERT WITH US BY RN

## 2022-10-13 PROCEDURE — 80048 BASIC METABOLIC PNL TOTAL CA: CPT

## 2022-10-13 PROCEDURE — 96361 HYDRATE IV INFUSION ADD-ON: CPT | Performed by: EMERGENCY MEDICINE

## 2022-10-13 PROCEDURE — 71045 X-RAY EXAM CHEST 1 VIEW: CPT

## 2022-10-13 RX ORDER — ONDANSETRON 2 MG/ML
4 INJECTION INTRAMUSCULAR; INTRAVENOUS EVERY 30 MIN PRN
Status: COMPLETED | OUTPATIENT
Start: 2022-10-13 | End: 2022-10-14

## 2022-10-13 RX ORDER — SODIUM CHLORIDE 9 MG/ML
INJECTION, SOLUTION INTRAVENOUS CONTINUOUS
Status: DISCONTINUED | OUTPATIENT
Start: 2022-10-13 | End: 2022-10-14

## 2022-10-13 RX ADMIN — SODIUM CHLORIDE 1000 ML: 9 INJECTION, SOLUTION INTRAVENOUS at 23:48

## 2022-10-13 RX ADMIN — ONDANSETRON 4 MG: 2 INJECTION INTRAMUSCULAR; INTRAVENOUS at 23:48

## 2022-10-13 ASSESSMENT — ENCOUNTER SYMPTOMS
NECK PAIN: 0
HEADACHES: 0
EYE REDNESS: 0
COUGH: 0
CHILLS: 1
NECK STIFFNESS: 0
BRUISES/BLEEDS EASILY: 0
DIARRHEA: 1
VOMITING: 1
SHORTNESS OF BREATH: 0
RHINORRHEA: 0
POLYDIPSIA: 0
CONFUSION: 0
ABDOMINAL PAIN: 0
SORE THROAT: 0
FEVER: 1
NAUSEA: 1
DIFFICULTY URINATING: 0
ARTHRALGIAS: 0
ADENOPATHY: 0
COLOR CHANGE: 0

## 2022-10-13 ASSESSMENT — ACTIVITIES OF DAILY LIVING (ADL)
ADLS_ACUITY_SCORE: 39
ADLS_ACUITY_SCORE: 39

## 2022-10-13 NOTE — PROGRESS NOTES
Pt's son sent [employer] message this am reporting cough, chills, body aches starting last night.   Appt's for today cancelled. Pt and son completed a rapid antigen covid test, which is negative. Pt's son also reported 6lb weight gain from Tuesday to today. Denies LE edema, bloating in abdomen, or SOB.   Scheduled labs, bp and weight check for tomorrow.      Patient is 2 weeks post discharge. Pt's son reports VAD parameters stable and weight up 6lb from last visit. Reviewed medications and answered any questions. Patient's son reports he is sleeping a lot. Will gather more information after visit tomorrow and review plan forward with Dr. Lora.

## 2022-10-14 PROBLEM — R68.83 CHILLS: Status: ACTIVE | Noted: 2022-10-14

## 2022-10-14 PROBLEM — U07.1 COVID: Status: ACTIVE | Noted: 2022-10-14

## 2022-10-14 PROBLEM — R11.2 NAUSEA AND VOMITING, UNSPECIFIED VOMITING TYPE: Status: ACTIVE | Noted: 2022-10-14

## 2022-10-14 LAB
ALBUMIN SERPL BCG-MCNC: 3.5 G/DL (ref 3.5–5.2)
ALP SERPL-CCNC: 82 U/L (ref 40–129)
ALT SERPL W P-5'-P-CCNC: 16 U/L (ref 10–50)
ANION GAP SERPL CALCULATED.3IONS-SCNC: 14 MMOL/L (ref 7–15)
ANION GAP SERPL CALCULATED.3IONS-SCNC: 15 MMOL/L (ref 7–15)
AST SERPL W P-5'-P-CCNC: 21 U/L (ref 10–50)
ATRIAL RATE - MUSE: 117 BPM
BASOPHILS # BLD AUTO: 0.1 10E3/UL (ref 0–0.2)
BASOPHILS NFR BLD AUTO: 1 %
BILIRUB DIRECT SERPL-MCNC: 0.2 MG/DL (ref 0–0.3)
BILIRUB SERPL-MCNC: 0.5 MG/DL
BUN SERPL-MCNC: 10.1 MG/DL (ref 8–23)
BUN SERPL-MCNC: 11.9 MG/DL (ref 8–23)
C DIFF TOX B STL QL: NEGATIVE
CALCIUM SERPL-MCNC: 9 MG/DL (ref 8.8–10.2)
CALCIUM SERPL-MCNC: 9.1 MG/DL (ref 8.8–10.2)
CHLORIDE SERPL-SCNC: 102 MMOL/L (ref 98–107)
CHLORIDE SERPL-SCNC: 104 MMOL/L (ref 98–107)
CREAT SERPL-MCNC: 0.72 MG/DL (ref 0.67–1.17)
CREAT SERPL-MCNC: 0.74 MG/DL (ref 0.67–1.17)
CRP SERPL-MCNC: 101 MG/L
CRP SERPL-MCNC: 131 MG/L
D DIMER PPP FEU-MCNC: 1.19 UG/ML FEU (ref 0–0.5)
DEPRECATED HCO3 PLAS-SCNC: 18 MMOL/L (ref 22–29)
DEPRECATED HCO3 PLAS-SCNC: 19 MMOL/L (ref 22–29)
DIASTOLIC BLOOD PRESSURE - MUSE: NORMAL MMHG
EOSINOPHIL # BLD AUTO: 0.1 10E3/UL (ref 0–0.7)
EOSINOPHIL NFR BLD AUTO: 1 %
ERYTHROCYTE [DISTWIDTH] IN BLOOD BY AUTOMATED COUNT: 15.6 % (ref 10–15)
ERYTHROCYTE [DISTWIDTH] IN BLOOD BY AUTOMATED COUNT: 15.8 % (ref 10–15)
ERYTHROCYTE [DISTWIDTH] IN BLOOD BY AUTOMATED COUNT: 15.9 % (ref 10–15)
FLUAV RNA SPEC QL NAA+PROBE: NEGATIVE
FLUBV RNA RESP QL NAA+PROBE: NEGATIVE
GFR SERPL CREATININE-BSD FRML MDRD: >90 ML/MIN/1.73M2
GFR SERPL CREATININE-BSD FRML MDRD: >90 ML/MIN/1.73M2
GLUCOSE SERPL-MCNC: 76 MG/DL (ref 70–99)
GLUCOSE SERPL-MCNC: 93 MG/DL (ref 70–99)
HCT VFR BLD AUTO: 28 % (ref 40–53)
HCT VFR BLD AUTO: 29.8 % (ref 40–53)
HCT VFR BLD AUTO: 32.3 % (ref 40–53)
HGB BLD-MCNC: 10.2 G/DL (ref 13.3–17.7)
HGB BLD-MCNC: 9 G/DL (ref 13.3–17.7)
HGB BLD-MCNC: 9.8 G/DL (ref 13.3–17.7)
HOLD SPECIMEN: NORMAL
HOLD SPECIMEN: NORMAL
IMM GRANULOCYTES # BLD: 0.1 10E3/UL
IMM GRANULOCYTES NFR BLD: 1 %
INR PPP: 1.67 (ref 0.85–1.15)
INR PPP: 1.93 (ref 0.85–1.15)
INTERPRETATION ECG - MUSE: NORMAL
LDH SERPL L TO P-CCNC: 203 U/L (ref 0–250)
LDH SERPL L TO P-CCNC: 428 U/L (ref 0–250)
LYMPHOCYTES # BLD AUTO: 0.8 10E3/UL (ref 0.8–5.3)
LYMPHOCYTES NFR BLD AUTO: 7 %
MAGNESIUM SERPL-MCNC: 1.4 MG/DL (ref 1.7–2.3)
MAGNESIUM SERPL-MCNC: 4 MG/DL (ref 1.7–2.3)
MCH RBC QN AUTO: 29.1 PG (ref 26.5–33)
MCH RBC QN AUTO: 29.7 PG (ref 26.5–33)
MCH RBC QN AUTO: 29.8 PG (ref 26.5–33)
MCHC RBC AUTO-ENTMCNC: 31.6 G/DL (ref 31.5–36.5)
MCHC RBC AUTO-ENTMCNC: 32.1 G/DL (ref 31.5–36.5)
MCHC RBC AUTO-ENTMCNC: 32.9 G/DL (ref 31.5–36.5)
MCV RBC AUTO: 91 FL (ref 78–100)
MCV RBC AUTO: 91 FL (ref 78–100)
MCV RBC AUTO: 94 FL (ref 78–100)
MONOCYTES # BLD AUTO: 0.7 10E3/UL (ref 0–1.3)
MONOCYTES NFR BLD AUTO: 6 %
NEUTROPHILS # BLD AUTO: 9.4 10E3/UL (ref 1.6–8.3)
NEUTROPHILS NFR BLD AUTO: 84 %
NRBC # BLD AUTO: 0 10E3/UL
NRBC BLD AUTO-RTO: 0 /100
NT-PROBNP SERPL-MCNC: 4292 PG/ML (ref 0–900)
NT-PROBNP SERPL-MCNC: 5061 PG/ML (ref 0–900)
P AXIS - MUSE: NORMAL DEGREES
PLATELET # BLD AUTO: 151 10E3/UL (ref 150–450)
PLATELET # BLD AUTO: 159 10E3/UL (ref 150–450)
PLATELET # BLD AUTO: 159 10E3/UL (ref 150–450)
POTASSIUM SERPL-SCNC: 3.8 MMOL/L (ref 3.4–5.3)
POTASSIUM SERPL-SCNC: 4 MMOL/L (ref 3.4–5.3)
PR INTERVAL - MUSE: NORMAL MS
PROT SERPL-MCNC: 6.4 G/DL (ref 6.4–8.3)
QRS DURATION - MUSE: 180 MS
QT - MUSE: 498 MS
QTC - MUSE: 698 MS
R AXIS - MUSE: 68 DEGREES
RBC # BLD AUTO: 3.09 10E6/UL (ref 4.4–5.9)
RBC # BLD AUTO: 3.29 10E6/UL (ref 4.4–5.9)
RBC # BLD AUTO: 3.43 10E6/UL (ref 4.4–5.9)
RSV RNA SPEC NAA+PROBE: NEGATIVE
SARS-COV-2 RNA RESP QL NAA+PROBE: POSITIVE
SODIUM SERPL-SCNC: 136 MMOL/L (ref 136–145)
SODIUM SERPL-SCNC: 136 MMOL/L (ref 136–145)
SYSTOLIC BLOOD PRESSURE - MUSE: NORMAL MMHG
T AXIS - MUSE: 73 DEGREES
VENTRICULAR RATE- MUSE: 118 BPM
WBC # BLD AUTO: 10 10E3/UL (ref 4–11)
WBC # BLD AUTO: 11 10E3/UL (ref 4–11)
WBC # BLD AUTO: 9.3 10E3/UL (ref 4–11)

## 2022-10-14 PROCEDURE — 87493 C DIFF AMPLIFIED PROBE: CPT

## 2022-10-14 PROCEDURE — 96376 TX/PRO/DX INJ SAME DRUG ADON: CPT | Performed by: EMERGENCY MEDICINE

## 2022-10-14 PROCEDURE — 250N000013 HC RX MED GY IP 250 OP 250 PS 637: Performed by: INTERNAL MEDICINE

## 2022-10-14 PROCEDURE — 85025 COMPLETE CBC W/AUTO DIFF WBC: CPT

## 2022-10-14 PROCEDURE — 93750 INTERROGATION VAD IN PERSON: CPT

## 2022-10-14 PROCEDURE — 250N000011 HC RX IP 250 OP 636: Performed by: EMERGENCY MEDICINE

## 2022-10-14 PROCEDURE — 250N000013 HC RX MED GY IP 250 OP 250 PS 637

## 2022-10-14 PROCEDURE — XW043E5 INTRODUCTION OF REMDESIVIR ANTI-INFECTIVE INTO CENTRAL VEIN, PERCUTANEOUS APPROACH, NEW TECHNOLOGY GROUP 5: ICD-10-PCS | Performed by: STUDENT IN AN ORGANIZED HEALTH CARE EDUCATION/TRAINING PROGRAM

## 2022-10-14 PROCEDURE — 36415 COLL VENOUS BLD VENIPUNCTURE: CPT

## 2022-10-14 PROCEDURE — 82310 ASSAY OF CALCIUM: CPT

## 2022-10-14 PROCEDURE — 96365 THER/PROPH/DIAG IV INF INIT: CPT | Performed by: EMERGENCY MEDICINE

## 2022-10-14 PROCEDURE — 250N000013 HC RX MED GY IP 250 OP 250 PS 637: Performed by: EMERGENCY MEDICINE

## 2022-10-14 PROCEDURE — 83880 ASSAY OF NATRIURETIC PEPTIDE: CPT

## 2022-10-14 PROCEDURE — 85027 COMPLETE CBC AUTOMATED: CPT

## 2022-10-14 PROCEDURE — 214N000001 HC R&B CCU UMMC

## 2022-10-14 PROCEDURE — 93750 INTERROGATION VAD IN PERSON: CPT | Performed by: STUDENT IN AN ORGANIZED HEALTH CARE EDUCATION/TRAINING PROGRAM

## 2022-10-14 PROCEDURE — 99222 1ST HOSP IP/OBS MODERATE 55: CPT | Mod: 25 | Performed by: STUDENT IN AN ORGANIZED HEALTH CARE EDUCATION/TRAINING PROGRAM

## 2022-10-14 PROCEDURE — 258N000003 HC RX IP 258 OP 636

## 2022-10-14 PROCEDURE — 85610 PROTHROMBIN TIME: CPT

## 2022-10-14 PROCEDURE — 250N000011 HC RX IP 250 OP 636

## 2022-10-14 PROCEDURE — 96361 HYDRATE IV INFUSION ADD-ON: CPT | Performed by: EMERGENCY MEDICINE

## 2022-10-14 PROCEDURE — 82248 BILIRUBIN DIRECT: CPT

## 2022-10-14 PROCEDURE — 83615 LACTATE (LD) (LDH) ENZYME: CPT

## 2022-10-14 PROCEDURE — 86140 C-REACTIVE PROTEIN: CPT

## 2022-10-14 PROCEDURE — 83735 ASSAY OF MAGNESIUM: CPT

## 2022-10-14 RX ORDER — SODIUM CHLORIDE/ALOE VERA
GEL (GRAM) NASAL AT BEDTIME
Status: DISCONTINUED | OUTPATIENT
Start: 2022-10-14 | End: 2022-10-18 | Stop reason: HOSPADM

## 2022-10-14 RX ORDER — HYDRALAZINE HYDROCHLORIDE 25 MG/1
25 TABLET, FILM COATED ORAL 3 TIMES DAILY
Status: DISCONTINUED | OUTPATIENT
Start: 2022-10-14 | End: 2022-10-14

## 2022-10-14 RX ORDER — HYDROXYCHLOROQUINE SULFATE 200 MG/1
200 TABLET, FILM COATED ORAL 2 TIMES DAILY
Status: DISCONTINUED | OUTPATIENT
Start: 2022-10-14 | End: 2022-10-14

## 2022-10-14 RX ORDER — ACETAMINOPHEN 325 MG/1
975 TABLET ORAL ONCE
Status: COMPLETED | OUTPATIENT
Start: 2022-10-14 | End: 2022-10-14

## 2022-10-14 RX ORDER — DIGOXIN 125 MCG
125 TABLET ORAL DAILY
Status: DISCONTINUED | OUTPATIENT
Start: 2022-10-14 | End: 2022-10-18 | Stop reason: HOSPADM

## 2022-10-14 RX ORDER — LISINOPRIL 10 MG/1
10 TABLET ORAL DAILY
Status: DISCONTINUED | OUTPATIENT
Start: 2022-10-14 | End: 2022-10-18 | Stop reason: HOSPADM

## 2022-10-14 RX ORDER — MULTIPLE VITAMINS W/ MINERALS TAB 9MG-400MCG
1 TAB ORAL DAILY
Status: DISCONTINUED | OUTPATIENT
Start: 2022-10-14 | End: 2022-10-18 | Stop reason: HOSPADM

## 2022-10-14 RX ORDER — TAMSULOSIN HYDROCHLORIDE 0.4 MG/1
0.4 CAPSULE ORAL DAILY
Status: DISCONTINUED | OUTPATIENT
Start: 2022-10-14 | End: 2022-10-18 | Stop reason: HOSPADM

## 2022-10-14 RX ORDER — WARFARIN SODIUM 7.5 MG/1
7.5 TABLET ORAL ONCE
Status: COMPLETED | OUTPATIENT
Start: 2022-10-14 | End: 2022-10-14

## 2022-10-14 RX ORDER — ATORVASTATIN CALCIUM 40 MG/1
40 TABLET, FILM COATED ORAL EVERY EVENING
Status: DISCONTINUED | OUTPATIENT
Start: 2022-10-14 | End: 2022-10-18 | Stop reason: HOSPADM

## 2022-10-14 RX ORDER — LIDOCAINE 40 MG/G
CREAM TOPICAL
Status: DISCONTINUED | OUTPATIENT
Start: 2022-10-14 | End: 2022-10-18 | Stop reason: HOSPADM

## 2022-10-14 RX ORDER — MYCOPHENOLIC ACID 180 MG/1
180 TABLET, DELAYED RELEASE ORAL 2 TIMES DAILY
Status: DISCONTINUED | OUTPATIENT
Start: 2022-10-14 | End: 2022-10-18 | Stop reason: HOSPADM

## 2022-10-14 RX ORDER — ACETAMINOPHEN 325 MG/1
975 TABLET ORAL EVERY 8 HOURS PRN
Status: DISCONTINUED | OUTPATIENT
Start: 2022-10-14 | End: 2022-10-18 | Stop reason: HOSPADM

## 2022-10-14 RX ORDER — ASPIRIN 81 MG/1
81 TABLET, CHEWABLE ORAL DAILY
Status: DISCONTINUED | OUTPATIENT
Start: 2022-10-14 | End: 2022-10-18 | Stop reason: HOSPADM

## 2022-10-14 RX ADMIN — Medication 1 TABLET: at 07:40

## 2022-10-14 RX ADMIN — THIAMINE HCL TAB 100 MG 100 MG: 100 TAB at 07:40

## 2022-10-14 RX ADMIN — HYDROXYCHLOROQUINE SULFATE 200 MG: 200 TABLET, FILM COATED ORAL at 07:39

## 2022-10-14 RX ADMIN — METHYLCELLULOSE 1000 MG: 500 TABLET ORAL at 07:39

## 2022-10-14 RX ADMIN — WARFARIN SODIUM 7.5 MG: 7.5 TABLET ORAL at 19:43

## 2022-10-14 RX ADMIN — TAMSULOSIN HYDROCHLORIDE 0.4 MG: 0.4 CAPSULE ORAL at 07:40

## 2022-10-14 RX ADMIN — ONDANSETRON 4 MG: 2 INJECTION INTRAMUSCULAR; INTRAVENOUS at 20:46

## 2022-10-14 RX ADMIN — ASPIRIN 81 MG CHEWABLE TABLET 81 MG: 81 TABLET CHEWABLE at 15:33

## 2022-10-14 RX ADMIN — ONDANSETRON 4 MG: 2 INJECTION INTRAMUSCULAR; INTRAVENOUS at 07:51

## 2022-10-14 RX ADMIN — ACETAMINOPHEN 975 MG: 325 TABLET, FILM COATED ORAL at 00:28

## 2022-10-14 RX ADMIN — HYDRALAZINE HYDROCHLORIDE 25 MG: 25 TABLET, FILM COATED ORAL at 07:40

## 2022-10-14 RX ADMIN — SERTRALINE 75 MG: 25 TABLET, FILM COATED ORAL at 07:39

## 2022-10-14 RX ADMIN — LISINOPRIL 10 MG: 10 TABLET ORAL at 07:39

## 2022-10-14 RX ADMIN — DIGOXIN 125 MCG: 125 TABLET ORAL at 07:39

## 2022-10-14 RX ADMIN — SALINE NASAL SPRAY 2 SPRAY: 1.5 SOLUTION NASAL at 21:53

## 2022-10-14 RX ADMIN — ACETAMINOPHEN 975 MG: 325 TABLET, FILM COATED ORAL at 07:53

## 2022-10-14 RX ADMIN — REMDESIVIR 200 MG: 100 INJECTION, POWDER, LYOPHILIZED, FOR SOLUTION INTRAVENOUS at 05:59

## 2022-10-14 ASSESSMENT — ACTIVITIES OF DAILY LIVING (ADL)
ADLS_ACUITY_SCORE: 39
ADLS_ACUITY_SCORE: 29
ADLS_ACUITY_SCORE: 39

## 2022-10-14 NOTE — CONSULTS
Post VAD Patient Social Work Assessment     Patient Name: Dandy Sands  : 1960  Age: 61 year old  MRN: [id number]  Date of LVAD: 2022 after admission to the hospital middle of     Patient known to me from follow up in the [facility] program.  Admitted on 10/14 for Infection, + for COVID. Seen today to update assessment. Hospitalized -9/15 and again -. Discharged both times to local apartment ([facility]) with Son and Dtr taking turns providing 24-hour caregiver support.     Presenting Information   Living Situation: Normally, lives alone in own home, but has struggled with advanced heart failure since May of 2022 and been inpatient since end of May/begin. Now, family is in a local apartment, but patient has only been there for a couple of weeks and has had to be re-hospitalized X3    Functional Status: Dandy has continued to struggle during his recovery, but reports that after discharge this last time, he was doing better. Not using the walker, going to the gym at the apartment and walking on the treadmill and walking in the hallways at the apartment for exercise. Reports today that he feels very sick right now.    Cultural/Language/Spiritual Considerations: None    Support System  Primary Support Person: Son and Dtr-Edwin  Other support:  None  Plan for support in immediate post-hospital period: Son and Dtr will continue to provide support as needed.    Health Care Directive  Decision Maker: Dandy is able to make own decisions, but has a HCD on file  Alternate Decision Maker: Son and Dtr  Health Care Directive: Copy in Chart    Mental Health/Coping:   History of Mental Health: No history of mental health concerns, but has struggled with hopefulness post-surgery and discharge from the hospital  History of Chemical Health: None  Current status: N/A  Coping: Difficult to assess as he is feeling so poor right now  Services Needed/Recommended: May need continued health  psychology and continued connection to other VAD patients    Financial   Income: Using personal senior living/401K money to pay for monthly insurance premiums. No income as patient unable to work.  Impact of VAD on income: Minimal-patient already unable to work due to heart failure, but most likely won't be able to return.  Insurance and medication coverage: Private insurance through employer-COBRA  Financial concerns: Using mike assistance for local lodging, but all mike assistance utilized  Resources needed: None identified right now. Family providing additional financial support as needed.    Discharge Plan   Patient and family discharge goal: Back to apartment as soon as able. Wants to be able to return to [place]  Barriers to discharge: Medical condition-new COVID    Education provided by SW: Social Work role inpatient setting, availability of support groups    Assessment and recommendations and plan:  Writer familiar with patient and family from VAD implant, need for local lodging at [place] and multiple hospitalizations. Unsure what Dandy's discharge needs will be. Son and Dtr providing 24-hour caregiver support, but everyone just wants patient to be able to return to [place] soon, as it is becoming difficult to manage living in the [place]. Here now with COVID. Feeling sick. Unsure what his discharge plans will be. VAD team follows outpatient.

## 2022-10-14 NOTE — PROGRESS NOTES
Indication of Interrogation:  Elevated LDH in setting of COVID. eval power and sx of pump thrombus  Waveforms sent to [employer]  Type of VAD:  Heartmate 3    Current Parameters:  Flow= 3.9 lpm, Speed= 5100 rpm, Power= 3.5 persaud, PI (if applicable)= 3.7    Abnormal Alarm on History:  No    Abnormal Events/Parameters Notes:  Yes, explain: frequent PI events. PI ranging 2.1-7.4. occasional speed drops. Hx back 4 days    Changes Made during Interrogation:  Yes, explain: updated event history  to q12h to capture trends in VAD parameters. Updated hematocrit setting.     No

## 2022-10-14 NOTE — PHARMACY-ANTICOAGULATION SERVICE
Clinical Pharmacy - Warfarin Dosing Consult     Pharmacy has been consulted to manage this patient s warfarin therapy.  Indication: LVAD/RVAD  Therapy Goal: INR 2-3  Provider/Team: [facility] nurse visits and checks INR  OP Anticoag Clinic: [facility]  Warfarin Prior to Admission: Yes  Warfarin PTA Regimen: 7.5 mg daily  Significant drug interactions: Remdesivir - may enhance AC effect of warfarin; digoxin (PTA) may enhance AC effect of warfarin  Recent documented change in oral intake/nutrition: Unknown    INR   Date Value Ref Range Status   10/14/2022 1.67 (H) 0.85 - 1.15 Final   10/13/2022 1.93 (H) 0.85 - 1.15 Final     Factor 10 Chromogenic   Date Value Ref Range Status   08/23/2022 24 (L) 70 - 130 % Final       Recommend warfarin 7.5 mg today. Patient missed his 10/13 PM dose - INR subthera 10/13 and 10/14. Will give dose early in light of missed dose with subtherapeutic INR. Pharmacy will monitor Dandy Sands daily and order warfarin doses to achieve specified goal.      Please contact pharmacy as soon as possible if the warfarin needs to be held for a procedure or if the warfarin goals change.

## 2022-10-14 NOTE — ED PROVIDER NOTES
ED Provider Note  [facility]      History     Chief Complaint   Patient presents with     Flu Symptoms     Patient c/o fever, n/v/d, cough and chills o/s last night. PMH: LVAD, C. Diff dx in September HPI  Dandy Sands is a 61 year old male with a past medical history of SLE, antiphospholipid syndrome, pulmonary embolism (on warfarin), ICM (EF 10-15%) s/p HM3 LVAD (7/28/22), and C. Diff (9/2022) presenting to the ED with fever, nausea, vomiting, and diarrhea.     Patient developed nonbloody, nonbilious vomiting with nausea and worsening of his diarrhea earlier today.  He has also experienced subjective fevers and chills. He denies chest pain or shortness of breath.  No cough.  He is vaccinated against COVID-19 virus and he has had COVID-19 virus infection in January 2022.  His son had identical symptoms earlier this week.  Patient has been taking Tylenol at home with the last dose at 2 PM.  They are in touch with the LVAD coordinator and were trying to manage his symptoms at home, however, at this point he is unable to tolerate p.o. liquids and they are concerned for dehydration and hypovolemia.  No pain.  No other concerns or complaints.     This part of the medical record was transcribed by Isi Haider, Medical Scribe, from a dictation done by Elisa Jordan MD.     Past Medical History  Past Medical History:   Diagnosis Date     Antiphospholipid antibody syndrome (H)      CAD (coronary artery disease)      Chronic systolic heart failure (H)      Ischemic cardiomyopathy      Mitral regurgitation      Systemic lupus erythematosus (H)      Past Surgical History:   Procedure Laterality Date     COLONOSCOPY N/A 05/23/2022    Procedure: COLONOSCOPY;  Surgeon: Parth Meneses MD;  Location: [facility] OR     CV CORONARY ANGIOGRAM N/A 05/24/2022    Procedure: Coronary Angiogram;  Surgeon: Mike Pope MD;  Location:  HE[facility] CARDIAC CATH LAB     CV CORONARY ANGIOGRAM N/A  05/29/2022    Procedure: Coronary Angiogram;  Surgeon: Austin Bright MD;  Location: [facility]     CV CORONARY LITHOTRIPSY PCI Bilateral 05/24/2022    Procedure: Percutaneous Coronary Intervention - Lithotripsy;  Surgeon: Mike Pope MD;  Location: [facility]     CV INTRA AORTIC BALLOON N/A 06/17/2022    Procedure: Intraprocedure Aortic Balloon Pump Insertion;  Surgeon: Sherman Cerda MD;  Location: [facility]     CV INTRA AORTIC BALLOON Right 07/03/2022    Procedure: Reposition of Subclavian Intraprocedure Aortic Balloon Pump;  Surgeon: Austin Bright MD;  [facility]     CV INTRAVASULAR ULTRASOUND N/A 05/24/2022    Procedure: Intravascular Ultrasound;  Surgeon: Mike Pope MD;  [facility]     CV PCI ANGIOPLASTY N/A 05/29/2022    Procedure: Percutaneous Transluminal Angioplasty;  Surgeon: Austin Bright MD;  Location: [facility]     CV PCI STENT DRUG ELUTING N/A 05/24/2022    Procedure: Percutaneous Coronary Intervention Stent;  Surgeon: Mike Pope MD;  Location: [facility]     CV RIGHT HEART CATH MEASUREMENTS RECORDED N/A 05/18/2022    Procedure: Right Heart Catheterization;  Surgeon: Justo Stewart MD;  [facility]     CV RIGHT HEART CATH MEASUREMENTS RECORDED N/A 05/24/2022    Procedure: Right Heart Catheterization;  Surgeon: Mike Pope MD;  [facility]     CV RIGHT HEART CATH MEASUREMENTS RECORDED N/A 05/29/2022    Procedure: Right Heart Catheterization;  Surgeon: Austin Bright MD;  [facility]: [facility]     CV RIGHT HEART CATH MEASUREMENTS RECORDED N/A 07/01/2022    Procedure: Right Heart Catheterization;  Surgeon: Mike Pope MD;  [facility]     CV RIGHT HEART CATH MEASUREMENTS RECORDED N/A 9/23/2022    Procedure: Right Heart Catheterization;   Surgeon: Justo Stewart MD;  Location:  HEART CARDIAC CATH LAB     CV RIGHT HEART EXERCISE STRESS STUDY N/A 05/18/2022    Procedure: Stress Drug Study;  Surgeon: Justo Stewart MD;  Location:  HEART CARDIAC CATH LAB     CV SWAN CHOCO PROCEDURE N/A 06/17/2022    Procedure: [place] Choco Procedure;  Surgeon: Sherman Cerda MD;  Location:  HEART CARDIAC CATH LAB     INCISION AND DRAINAGE CHEST WASHOUT, COMBINED N/A 07/11/2022    Procedure: Chest washout and closure;  Surgeon: Darnell Morgan MD;  Location: [facility]     INCISION AND DRAINAGE CHEST WASHOUT, COMBINED N/A 07/12/2022    Procedure: INCISION AND DRAINAGE, WOUND, CHEST, WITH IRRIGATION;  Surgeon: Darius Zamora MD;  Location: [facility] OR     INSERT ARTERIAL LINE  07/18/2022          INSERT INTRAAORTIC BALLOON PUMP Right 06/23/2022    Procedure: INSERTION, INTRA-AORTIC BALLOON PUMP RIGHT SUBCLAVIAN ARTERY.;  Surgeon: Hayden Veras MD;  Location: [facility]     INSERT VENTRICULAR ASSIST DEVICE LEFT (HEARTMATE II/III) MINIMALLY INVASIVE N/A 07/08/2022    Procedure: MEDIAN STERNOTOMY.  CARDIOPULMONARY BYPASS.  INTRAOPERATIVE TRANSESOPHAGEAL ECHOCARDIOGRAM.  IMPLANT LEFT VENTRICULAR ASSIST DEVICE HEARTMATE III.  PLACEMENT OF PERCUTANEOUS RIGHT VENTRICULAR ASSIST DEVICE.  TEMPORARY CHEST CLOSURE;  Surgeon: Darius Zamora MD;  Location: [facility]     IR CHEST TUBE PLACEMENT NON-TUNNELED RIGHT  08/01/2022     IRRIGATION AND DEBRIDEMENT CHEST WASHOUT, COMBINED N/A 07/13/2022    Procedure: IRRIGATION AND DEBRIDEMENT, CHEST;  Surgeon: Darius Zamora MD;  Location: [facility] OR     MIDLINE DOUBLE LUMEN PLACEMENT Left 09/11/2022    Mid line ok to use     MIDLINE DOUBLE LUMEN PLACEMENT Right 09/14/2022    5FR DL midline.     PICC DOUBLE LUMEN PLACEMENT Right 05/28/2022    right basilic 5 fr dl picc 40 cm     PICC DOUBLE LUMEN PLACEMENT Right 08/15/2022    Right Lateral, 41 total length, 3 cm external length     acetaminophen (TYLENOL) 325 MG tablet  atorvastatin  (LIPITOR) 40 MG tablet  digoxin (LANOXIN) 125 MCG tablet  hydrALAZINE (APRESOLINE) 25 MG tablet  hydrocortisone 1 % CREA cream  hydroxychloroquine (PLAQUENIL) 200 MG tablet  lisinopril (ZESTRIL) 10 MG tablet  multivitamin w/minerals (THERA-VIT-M) tablet  MYFORTIC (BRAND) 180 MG EC tablet  pramox-pe-glycerin-petrolatum (PREPARATION H) 1-0.25-14.4-15 % CREA cream  promethazine (PHENERGAN) 12.5 MG tablet  saline nasal (AYR SALINE) GEL topical gel  sodium chloride (OCEAN) 0.65 % nasal spray  tamsulosin (FLOMAX) 0.4 MG capsule  thiamine (B-1) 100 MG tablet  warfarin ANTICOAGULANT (COUMADIN) 2.5 MG tablet      No Known Allergies  Family History  No family history on file.  Social History   Social History     Tobacco Use     Smoking status: Former     Smokeless tobacco: Never      Past medical history, past surgical history, medications, allergies, family history, and social history were reviewed with the patient. No additional pertinent items.       Review of Systems   Constitutional: Positive for chills and fever ( subjective).   HENT: Negative for congestion, rhinorrhea and sore throat.    Eyes: Negative for redness.   Respiratory: Negative for cough and shortness of breath.    Cardiovascular: Negative for chest pain.   Gastrointestinal: Positive for diarrhea, nausea and vomiting. Negative for abdominal pain.   Endocrine: Negative for polydipsia and polyuria.   Genitourinary: Negative for difficulty urinating.   Musculoskeletal: Negative for arthralgias, neck pain and neck stiffness.   Skin: Negative for color change.   Allergic/Immunologic: Negative for immunocompromised state.   Neurological: Negative for headaches.   Hematological: Negative for adenopathy. Does not bruise/bleed easily.   Psychiatric/Behavioral: Negative for confusion.   All other systems reviewed and are negative.    A complete review of systems was performed with pertinent positives and negatives noted in the HPI, and all other systems  "negative.    Physical Exam   BP:  (LVAD - need manual)  Pulse: 64  Temp: 99.7  F (37.6  C)  Resp: 19  Height: 170.2 cm (5' 7\")  Weight: 66.2 kg (146 lb)  SpO2: 98 %  Physical Exam  Vitals and nursing note reviewed.   Constitutional:       General: He is in acute distress ( mild).      Appearance: He is ill-appearing. He is not toxic-appearing or diaphoretic.   HENT:      Head: Normocephalic and atraumatic.      Mouth/Throat:      Mouth: Oropharynx is clear and moist.   Eyes:      General: No scleral icterus.     Extraocular Movements: Extraocular movements intact.      Conjunctiva/sclera: Conjunctivae normal.   Cardiovascular:      Rate and Rhythm: Normal rate.      Pulses: Intact distal pulses.      Comments: Machine-like heart tones.  Pulmonary:      Effort: Pulmonary effort is normal. No respiratory distress.      Breath sounds: Normal breath sounds. No wheezing or rhonchi.   Abdominal:      Palpations: Abdomen is soft.      Tenderness: There is no abdominal tenderness. There is no guarding or rebound.   Musculoskeletal:         General: No tenderness or edema. Normal range of motion.      Cervical back: Normal range of motion and neck supple.   Skin:     General: Skin is warm.      Findings: No rash.   Neurological:      General: No focal deficit present.      Mental Status: He is alert and oriented to person, place, and time.      Cranial Nerves: No cranial nerve deficit.      Coordination: Coordination normal.   Psychiatric:         Mood and Affect: Mood normal.         Behavior: Behavior normal.         Thought Content: Thought content normal.         Judgment: Judgment normal.         ED Course      Procedures            EKG Interpretation:      Interpreted by Elisa Jordan MD  Time reviewed: 9:26 PM  Symptoms at time of EKG: Nausea/vomiting  Rhythm: Paced rhythm  Rate: 118  Axis: LAD  Ectopy: none  Conduction: QT prolongation  ST Segments/ T Waves: No ST-T wave changes  Q Waves: none  Comparison to prior: " Unchanged from old EKG from September 20, 2022    Clinical Impression: Paced rhythm without acute ischemic changes                  Results for orders placed or performed during the hospital encounter of 10/13/22   XR Chest Port 1 View     Status: None    Narrative    EXAM: XR CHEST PORT 1 VIEW  LOCATION: [facility]  DATE/TIME: 10/13/2022 10:16 PM    INDICATION: Fever, chills, nausea vomiting  COMPARISON: 09/18/2022      Impression    IMPRESSION: No acute process. No evidence for CHF or pneumonia. No pleural effusion or pneumothorax. Stable mild elevation of the right hemidiaphragm. Stable left sided biventricular pacer/defibrillator and left ventricular assist device. Mild   cardiomegaly. Prior sternotomy.   [place] Draw     Status: None    Narrative    The following orders were created for panel order [place] Draw.  Procedure                               Abnormality         Status                     ---------                               -----------         ------                     Extra Blue Top Tube[[id number]]                              Final result               Extra Red Top Tube[[id number]]                               Final result               Extra Green Top (Lithium...[[id number]]                      Final result               Extra Purple Top Tube[[id number]]                            Final result                 Please view results for these tests on the individual orders.   Extra Blue Top Tube     Status: None   Result Value Ref Range    Hold Specimen [facility]    Extra Red Top Tube     Status: None   Result Value Ref Range    Hold Specimen [facility]    Extra Green Top (Lithium Heparin) Tube     Status: None   Result Value Ref Range    Hold Specimen JIC    Extra Purple Top Tube     Status: None   Result Value Ref Range    Hold Specimen JIC    [place] Draw     Status: None    Narrative    The following orders were created for panel order [place] Draw.  Procedure                                Abnormality         Status                     ---------                               -----------         ------                     Extra Blood Culture Bottle[[id number]]                       Final result                 Please view results for these tests on the individual orders.   Extra Blood Culture Bottle     Status: None   Result Value Ref Range    Hold Specimen [facility]    Comprehensive metabolic panel     Status: Abnormal   Result Value Ref Range    Sodium 136 136 - 145 mmol/L    Potassium 4.0 3.4 - 5.3 mmol/L    Chloride 102 98 - 107 mmol/L    Carbon Dioxide (CO2) 19 (L) 22 - 29 mmol/L    Anion Gap 15 7 - 15 mmol/L    Urea Nitrogen 10.1 8.0 - 23.0 mg/dL    Creatinine 0.72 0.67 - 1.17 mg/dL    Calcium 9.1 8.8 - 10.2 mg/dL    Glucose 93 70 - 99 mg/dL    Alkaline Phosphatase 100 40 - 129 U/L    AST 26 10 - 50 U/L    ALT 19 10 - 50 U/L    Protein Total 7.7 6.4 - 8.3 g/dL    Albumin 4.2 3.5 - 5.2 g/dL    Bilirubin Total 0.7 <=1.2 mg/dL    GFR Estimate >90 >60 mL/min/1.73m2   Lipase     Status: Abnormal   Result Value Ref Range    Lipase 69 (H) 13 - 60 U/L   CBC with platelets and differential     Status: Abnormal   Result Value Ref Range    WBC Count 11.6 (H) 4.0 - 11.0 10e3/uL    RBC Count 3.89 (L) 4.40 - 5.90 10e6/uL    Hemoglobin 11.5 (L) 13.3 - 17.7 g/dL    Hematocrit 36.5 (L) 40.0 - 53.0 %    MCV 94 78 - 100 fL    MCH 29.6 26.5 - 33.0 pg    MCHC 31.5 31.5 - 36.5 g/dL    RDW 15.8 (H) 10.0 - 15.0 %    Platelet Count 203 150 - 450 10e3/uL    % Neutrophils 76 %    % Lymphocytes 17 %    % Monocytes 6 %    % Eosinophils 1 %    % Basophils 0 %    % Immature Granulocytes 0 %    NRBCs per 100 WBC 0 <1 /100    Absolute Neutrophils 8.8 (H) 1.6 - 8.3 10e3/uL    Absolute Lymphocytes 1.9 0.8 - 5.3 10e3/uL    Absolute Monocytes 0.7 0.0 - 1.3 10e3/uL    Absolute Eosinophils 0.1 0.0 - 0.7 10e3/uL    Absolute Basophils 0.0 0.0 - 0.2 10e3/uL    Absolute Immature Granulocytes 0.1 <=0.4 10e3/uL     Absolute NRBCs 0.0 10e3/uL   EKG 12-lead, tracing only     Status: None (Preliminary result)   Result Value Ref Range    Systolic Blood Pressure  mmHg    Diastolic Blood Pressure  mmHg    Ventricular Rate 118 BPM    Atrial Rate 117 BPM    AZ Interval  ms    QRS Duration 180 ms     ms    QTc 698 ms    P Axis  degrees    R AXIS 68 degrees    T Axis 73 degrees    Interpretation ECG       Wide QRS rhythm with occasional Premature ventricular complexes  Non-specific intra-ventricular conduction block  Cannot rule out Septal infarct , age undetermined  Abnormal ECG     CBC with platelets differential     Status: Abnormal    Narrative    The following orders were created for panel order CBC with platelets differential.  Procedure                               Abnormality         Status                     ---------                               -----------         ------                     CBC with platelets and d...[[id number]]  Abnormal            Final result                 Please view results for these tests on the individual orders.     Medications   0.9% sodium chloride BOLUS (has no administration in time range)     Followed by   sodium chloride 0.9% infusion (has no administration in time range)   ondansetron (ZOFRAN) injection 4 mg (has no administration in time range)        Assessments & Plan (with Medical Decision Making)   61-year-old man on LVAD presenting with nonbloody, nonbilious vomiting, nausea, and diarrhea.  Differential diagnosis: Dehydration, electrolyte abnormalities, acute pancreatitis, unlikely acute cholecystitis, COVID-19 infection, influenza, pneumonia.    After thorough history and physical exam patient appears to be in acute distress due to nausea.  I will treat his nausea with IV Zofran, hydrate him with a bolus of intravenous normal saline.  I will obtain laboratory studies and chest x-ray for further diagnostic evaluation along with an EKG.  He and his son agree with  plan.    Patient will be signed out to my partner awaiting laboratory studies, reevaluation, and disposition.    I have reviewed the nursing notes. I have reviewed the findings, diagnosis, plan and need for follow up with the patient.    ------------------------------------------------------------------------------------  This part of the medical record was transcribed by Isi Haider, Medical Scribe, from a dictation done by Elisa Jordan MD.     New Prescriptions    No medications on file       Final diagnoses:   Nausea and vomiting, unspecified vomiting type   Chills       --  Elisa Jordan MD  [facility] EMERGENCY DEPARTMENT  10/13/2022     Elisa Jordan MD  10/13/22 [id number]

## 2022-10-14 NOTE — ED NOTES
I took signout on the patient from Dr. Jordan.  This is a 61-year-old male with an LVAD who presents with nausea vomiting diarrhea and feeling unwell.  Lab work shows WBC count of 11.6.  COVID test is positive.  Patient was given IV fluids, ondansetron, and acetaminophen.  We will admit for further observation and treatment.      Darius Miranda,   10/14/22 0224

## 2022-10-14 NOTE — ED NOTES
"ED Triage Provider Note  [facility]  Encounter Date: Oct 13, 2022    History:  Chief Complaint   Patient presents with     Flu Symptoms     Patient c/o fever, n/v/d, cough and chills o/s last night. PMH: LVAD, C. Diff dx in September     Dandy Sands is a 61 year old male with an LVAD who presents with nausea vomiting diarrhea.  Patient is here with family member who had the same symptoms a few days ago.  Patient developed his symptoms yesterday but became severe this evening.  He has been having frequent vomiting this evening.      Review of Systems:  Vomiting, diarrhea,    Exam:  Pulse 64   Temp 99.7  F (37.6  C) (Tympanic)   Resp 19   Ht 1.702 m (5' 7\")   Wt 66.2 kg (146 lb)   BMI 22.87 kg/m    General: Ill-appearing  Cardio: Mechanical sound from LVAD  Resp: Normal work of breathing, grossly normal respiratory rate  Neuro: Alert. CN II-XII grossly intact. Grossly intact strength.       Medical Decision Making:  Patient arriving to the ED with problem as above. A medical screening exam was performed.  Lab and fluid orders initiated from Triage. The patient is appropriate to be immediately roomed.       Darius Miranda, DO on 10/13/2022 at 8:37 PM     Darius Miranda,   10/13/22 2038    "

## 2022-10-14 NOTE — ED NOTES
"Refused aspirin. Requested warfarin be rescheduled to this evening because \"that's when I take it\". Dose resched by pharmacist.  "

## 2022-10-14 NOTE — H&P
[facility]    Cardiology History and Physical - Cardiology 2         Date of Admission:  10/13/2022    Assessment & Plan: HVSL    Adndy EDUARD Sands is a 61 year old male with a history of SLE, antiphospholipid syndrome, pulmonary embolism (on warfarin), ICM (EF 10-15%) s/p HM3 LVAD (7/28/22), and C. Diff infection who was admitted 10/14/2022 with 1 day of fever, body aches, nausea, vomiting, sore throat, and diarrhea, tested positive for COVID.     #COVID-19 infection  #History of COVID-19 infection in Jan 2022  Developed symptoms night of 10/12-10/13/2022 (fever, body aches, nausea, vomiting, sore throat, cough), tested positive in the ED 10/13/2022. He is on room air and afebrile on presentation. Had mild leukocytosis to 11.6 on admission. CXR without acute changes. Will treat symptomatically while inpatient, monitor for progression. Discussed with pharmacy and he qualifies for remdesivir, and patient is agreeable to this. Of note, he reports that he is vaccinated for COVID (Moderna 3/13/2021, 4/10/2021, 8/24/2021), and he had COVID Jan 2022.   - ordered remdesivir IV to be given while inpatient x3 doses (10/14-10/16)  - acetaminophen 975mg q8h prn  - AC with pta warfarin as below  - ordered CRP, D dimer, LDH  - holding pta Myfortic in the setting of infection for now    #Diarrhea  #History of C. Diff. Infection  Was recently admitted 9/18-9/29/2022 for diarrhea. ID was consulted at that time and did not think it was infectious diarrhea. GI was consulted and recommended stool bulking agents and he was started on methylcellulose. It does not sound like his diarrhea has worsened with current COVID infection.   - continue pta methylcellulose 1000mg bid  - can consider testing for C. Diff. If diarrhea worsens.     #Stage D HFrEF 2/2 ICM (EF 10-15%) s/p HM3 LVAD (7/28/22) and CRT-D ('06)  #CAD s/p PCI to LAD ('05)  Hypervolemic on exam, no respiratory distress. Will  "defer diuresis at this time given low po intake and diarrhea and continue to monitor.     - LVAD: LVAD speed 5100 (reduced from 5200 9/19/2022)  - Volume status: hypervolemic  - Dry weight: was 134 lbs when he was discharged from hospital last 9/29/2022  - Weight on admission: 146 lbs  - GDMT              > BB: discontinued during a previous admission              > ACEi/ARB/ARNi: continue pta lisinopril 10 mg daily              > MRA: None               > SGLT2i: None   - SCD ppx: s/p CRT-D (2006)  - AC: Pharmacy to dose warfarin    - INR goal 2-3  - continue pta digoxin 125 mcg daily  - continue pta atorvastatin 40 mg daily  - Telemetry  - Strict I&Os  - Daily standing weights  - Ordered LDH, BNP     #SLE  #APS  #Hx of DVT/PE  On warfarin PTA (INR goal 2-3).   - Pharmacy to dose warfarin  - continue pta hydroxychloroquine 200mg bid  - holding pta Myfortic in the setting of infection for now     #HTN  - continue pta hydralazine 25mg TID   - continue pta lisinopril 10 mg daily     #Depression  #MIGUEL  - continue pta sertraline 75mg daily   - follow-up on pharmacy med rec about other pta medications and resume if still active (Ritalin, quetiapine...)- he had been on these medications before but are not on his current pta med list, he is unsure of what medications he takes currently (\"my son is my pharmacist\")     #Malnutrition  - continue pta thiamine 100mg daily     #BPH  - continue pta tamsulosin 0.4mg daily       Diet: 2g Na diet, 2L fluid restriction  DVT Prophylaxis: Warfarin  Fitzgerald Catheter: Not present  Code Status: full code  Fluids: s/p 1L NS in the ED  Lines: PIV         Disposition Plan   Expected discharge: 2 - 3 days, recommended to prior living arrangement once COVID/diarrhea management.    Entered: Austin Can MD 10/14/2022, 2:59 AM     Patient will be formally staffed in the AM.     Austin Can MD   IM PGY2  [facility]    ______________________________________________________________________    Chief Complaint   Fever, body aches, nausea, vomiting, sore throat, diarrhea    History is obtained from the patient    History of Present Illness   Dandy Sands is a 61 year old male with a history of SLE, antiphospholipid syndrome, pulmonary embolism (on warfarin), ICM (EF 10-15%) s/p HM3 LVAD (7/28/22), and C. Diff infection who was admitted 10/14/2022 with 1 day of fever, body aches, nausea, vomiting, sore throat, and diarrhea, tested positive for COVID.     He was recently admitted from 9/18-9/29/2022 with low flow alarms and ongoing profuse diarrhea. Completed course of po vancomycin, encouraged fluids, adjusted medications, and trialed stool bulking agents. No low flow alarms since alarm threshold adjusted to 2. Diarrhea more formed and no incontinence at time of discharge. On the day of discharge had epistaxis which was packed by ENT.    Since discharge, he had been doing relatively okay. He says that he continued to have diarrhea and that it didn't change. He says that he has been staying at his apartment and not interacting with anyone except for his son and home nurse. The night of 10/12-10/13 he developed symptoms including fever (he said temp was 102F), diffuse body aches, nausea with episodes of vomiting, sore throat. Also reports some cough. If he takes a deep breath he sometimes has pain under his left rib. He took a home COVID test and it was negative. No headaches, chest pain, shortness of breath, dysuria. No drainage from driveline site. No LVAD alarms. Since last discharge from the hospital, he has had a few episodes of epistaxis, but he has gotten them to resolve with pressure and Afrin.    He says that his son developed similar symptoms a few days ago.     In the ED, initial vitals were MAP 78, , afebrile, RR 17, satting well on room air. Tested positive for COVID. CXR without acute process. He was given  1L of NS, acetaminophen 975mg, Zofran 4mg IV.     Social hx:   - tobacco- no  - alcohol- no  - illicit drugs- no  - code status- full code       Review of Systems    The 10 point Review of Systems is negative other than noted in the HPI or here.    Past Medical History    I have reviewed this patient's medical history and updated it with pertinent information if needed.   Past Medical History:   Diagnosis Date     Antiphospholipid antibody syndrome (H)      CAD (coronary artery disease)      Chronic systolic heart failure (H)      Ischemic cardiomyopathy      Mitral regurgitation      Systemic lupus erythematosus (H)        Past Surgical History   I have reviewed this patient's surgical history and updated it with pertinent information if needed.  Past Surgical History:   Procedure Laterality Date     COLONOSCOPY N/A 05/23/2022    Procedure: COLONOSCOPY;  Surgeon: Parth Meneses MD;  Location: [facility]     CV CORONARY ANGIOGRAM N/A 05/24/2022    Procedure: Coronary Angiogram;  Surgeon: Mike Pope MD;  Location:  [facility] CARDIAC CATH LAB     CV CORONARY ANGIOGRAM N/A 05/29/2022    Procedure: Coronary Angiogram;  Surgeon: Austin Bright MD;  Location:  HEART CARDIAC CATH LAB     CV CORONARY LITHOTRIPSY PCI Bilateral 05/24/2022    Procedure: Percutaneous Coronary Intervention - Lithotripsy;  Surgeon: Mike Pope MD;  Location:  HEART CARDIAC CATH LAB     CV INTRA AORTIC BALLOON N/A 06/17/2022    Procedure: Intraprocedure Aortic Balloon Pump Insertion;  Surgeon: Sherman Cerda MD;  Location:  HEART CARDIAC CATH LAB     CV INTRA AORTIC BALLOON Right 07/03/2022    Procedure: Reposition of Subclavian Intraprocedure Aortic Balloon Pump;  Surgeon: Austin Bright MD;  Location:  HEART CARDIAC CATH LAB     CV INTRAVASULAR ULTRASOUND N/A 05/24/2022    Procedure: Intravascular Ultrasound;  Surgeon: Mike Pope MD;  Location: [facility] CARDIAC CATH LAB     CV PCI ANGIOPLASTY N/A  05/29/2022    Procedure: Percutaneous Transluminal Angioplasty;  Surgeon: Austin Bright MD;  [facility]     CV PCI STENT DRUG ELUTING N/A 05/24/2022    Procedure: Percutaneous Coronary Intervention Stent;  Surgeon: Mike Pope MD;  [facility]     CV RIGHT HEART CATH MEASUREMENTS RECORDED N/A 05/18/2022    Procedure: Right Heart Catheterization;  Surgeon: Justo Stewart MD;  [facility]     CV RIGHT HEART CATH MEASUREMENTS RECORDED N/A 05/24/2022    Procedure: Right Heart Catheterization;  Surgeon: Mike Pope MD;  [facility]     CV RIGHT HEART CATH MEASUREMENTS RECORDED N/A 05/29/2022    Procedure: Right Heart Catheterization;  Surgeon: Austin Bright MD;  [facility]     CV RIGHT HEART CATH MEASUREMENTS RECORDED N/A 07/01/2022    Procedure: Right Heart Catheterization;  Surgeon: Mike Pope MD;  [facility]     CV RIGHT HEART CATH MEASUREMENTS RECORDED N/A 9/23/2022    Procedure: Right Heart Catheterization;  Surgeon: Justo Stewart MD;  [facility]     CV RIGHT HEART EXERCISE STRESS STUDY N/A 05/18/2022    Procedure: Stress Drug Study;  Surgeon: Justo Stewart MD;  [facility]     CV SWAN CHOCO PROCEDURE N/A 06/17/2022    Procedure: [place] Choco Procedure;  Surgeon: Sherman Cerda MD;  Location:  HEART CARDIAC CATH LAB     INCISION AND DRAINAGE CHEST WASHOUT, COMBINED N/A 07/11/2022    Procedure: Chest washout and closure;  Surgeon: Darnell Morgan MD;  Location: [facility]     INCISION AND DRAINAGE CHEST WASHOUT, COMBINED N/A 07/12/2022    Procedure: INCISION AND DRAINAGE, WOUND, CHEST, WITH IRRIGATION;  Surgeon: Darius Zamora MD;  Location: [facility] OR     INSERT ARTERIAL LINE  07/18/2022          INSERT INTRAAORTIC BALLOON PUMP Right 06/23/2022    Procedure: INSERTION, INTRA-AORTIC BALLOON  PUMP RIGHT SUBCLAVIAN ARTERY.;  Surgeon: Hayden Veras MD;  [facility]: [facility]     INSERT VENTRICULAR ASSIST DEVICE LEFT (HEARTMATE II/III) MINIMALLY INVASIVE N/A 07/08/2022    Procedure: MEDIAN STERNOTOMY.  CARDIOPULMONARY BYPASS.  INTRAOPERATIVE TRANSESOPHAGEAL ECHOCARDIOGRAM.  IMPLANT LEFT VENTRICULAR ASSIST DEVICE HEARTMATE III.  PLACEMENT OF PERCUTANEOUS RIGHT VENTRICULAR ASSIST DEVICE.  TEMPORARY CHEST CLOSURE;  Surgeon: Darius Zamora MD;  Location: [facility]     IR CHEST TUBE PLACEMENT NON-TUNNELED RIGHT  08/01/2022     IRRIGATION AND DEBRIDEMENT CHEST WASHOUT, COMBINED N/A 07/13/2022    Procedure: IRRIGATION AND DEBRIDEMENT, CHEST;  Surgeon: Darius Zamora MD;  Location: [facility]     MIDLINE DOUBLE LUMEN PLACEMENT Left 09/11/2022    Mid line ok to use     MIDLINE DOUBLE LUMEN PLACEMENT Right 09/14/2022    5FR DL midline.     PICC DOUBLE LUMEN PLACEMENT Right 05/28/2022    right basilic 5 fr dl picc 40 cm     PICC DOUBLE LUMEN PLACEMENT Right 08/15/2022    Right Lateral, 41 total length, 3 cm external length       Social History   I have reviewed this patient's social history and updated it with pertinent information if needed.  Social History     Tobacco Use     Smoking status: Former     Smokeless tobacco: Never     Family History   I have reviewed this patient's family history and updated it with pertinent information if needed.         Prior to Admission Medications   Prior to Admission Medications   Prescriptions Last Dose Informant Patient Reported? Taking?   MYFORTIC (BRAND) 180 MG EC tablet   No No   Sig: Take 1 tablet (180 mg) by mouth 2 times daily   acetaminophen (TYLENOL) 325 MG tablet   No No   Sig: Take 3 tablets (975 mg) by mouth every 8 hours as needed for mild pain   atorvastatin (LIPITOR) 40 MG tablet  Spouse/Significant Other No No   Sig: Take 1 tablet (40 mg) by mouth every evening for 30 days   digoxin (LANOXIN) 125 MCG tablet  Spouse/Significant Other No No   Sig: Take 1  tablet (125 mcg) by mouth daily for 30 days   hydrALAZINE (APRESOLINE) 25 MG tablet   No No   Sig: Take 1 tablet (25 mg) by mouth 3 times daily   hydrocortisone 1 % CREA cream  Spouse/Significant Other No No   Sig: Place rectally 3 times daily   hydroxychloroquine (PLAQUENIL) 200 MG tablet  Spouse/Significant Other No No   Sig: Take 1 tablet (200 mg) by mouth 2 times daily   lisinopril (ZESTRIL) 10 MG tablet  Spouse/Significant Other No No   Sig: Take 1 tablet (10 mg) by mouth daily for 30 days   multivitamin w/minerals (THERA-VIT-M) tablet  Spouse/Significant Other No No   Sig: Take 1 tablet by mouth daily   pramox-pe-glycerin-petrolatum (PREPARATION H) 1-0.25-14.4-15 % CREA cream  Spouse/Significant Other No No   Sig: Place rectally 2 times daily as needed for hemorrhoids Apply immediately after a bowel movement.   promethazine (PHENERGAN) 12.5 MG tablet  Spouse/Significant Other No No   Sig: Take 1 tablet (12.5 mg) by mouth every 6 hours as needed for nausea or vomiting   saline nasal (AYR SALINE) GEL topical gel  Spouse/Significant Other Yes No   Sig: Apply into each nare At Bedtime   sodium chloride (OCEAN) 0.65 % nasal spray  Spouse/Significant Other Yes No   Sig: Spray 2 sprays into both nostrils every 4 hours (while awake)   tamsulosin (FLOMAX) 0.4 MG capsule  Spouse/Significant Other Yes No   Sig: Take 0.4 mg by mouth daily   thiamine (B-1) 100 MG tablet  Spouse/Significant Other No No   Sig: Take 1 tablet (100 mg) by mouth daily   warfarin ANTICOAGULANT (COUMADIN) 2.5 MG tablet  Spouse/Significant Other No No   Sig: Take 2 tabs (5mg) daily at bedtime. Future dose adjustments pending INR      Facility-Administered Medications: None     Allergies   No Known Allergies    Physical Exam   Vital Signs: Temp: 99.6  F (37.6  C) Temp src: Tympanic BP: 95/73 Pulse: 101   Resp: 9 SpO2: 98 % O2 Device: None (Room air)    Weight: 146 lbs 0 oz    General: lying in bed, appears uncomfortable  HEENT: no scleral icterus or  conjunctival injection  Resp: clear to auscultation bilaterally. On room air.   Cardiac: LVAD hum. JVD mid-neck when lying at 20 degrees  Abdomen: soft, non-tender, non-distended. No rebound or guarding.  Extremities: warm, no lower extremity edema  MSK: normal muscle bulk  Skin: no lesions on exposed skin  Neuro: alert, answers questions appropriately. Moves all extremities spontaneously. Sensation intact to light touch of all extremities.   Psych: appropriate mood and affect    Data   Data reviewed today: I reviewed all medications, new labs and imaging results over the last 24 hours.

## 2022-10-15 LAB
ANION GAP SERPL CALCULATED.3IONS-SCNC: 11 MMOL/L (ref 7–15)
ANION GAP SERPL CALCULATED.3IONS-SCNC: 13 MMOL/L (ref 7–15)
BASOPHILS # BLD AUTO: 0 10E3/UL (ref 0–0.2)
BASOPHILS NFR BLD AUTO: 0 %
BUN SERPL-MCNC: 14.7 MG/DL (ref 8–23)
BUN SERPL-MCNC: 16.1 MG/DL (ref 8–23)
CALCIUM SERPL-MCNC: 8.5 MG/DL (ref 8.8–10.2)
CALCIUM SERPL-MCNC: 8.6 MG/DL (ref 8.8–10.2)
CHLORIDE SERPL-SCNC: 103 MMOL/L (ref 98–107)
CHLORIDE SERPL-SCNC: 105 MMOL/L (ref 98–107)
CREAT SERPL-MCNC: 0.69 MG/DL (ref 0.67–1.17)
CREAT SERPL-MCNC: 0.74 MG/DL (ref 0.67–1.17)
CRP SERPL-MCNC: 111 MG/L
DEPRECATED HCO3 PLAS-SCNC: 17 MMOL/L (ref 22–29)
DEPRECATED HCO3 PLAS-SCNC: 18 MMOL/L (ref 22–29)
EOSINOPHIL # BLD AUTO: 0 10E3/UL (ref 0–0.7)
EOSINOPHIL NFR BLD AUTO: 1 %
ERYTHROCYTE [DISTWIDTH] IN BLOOD BY AUTOMATED COUNT: 15.6 % (ref 10–15)
ERYTHROCYTE [DISTWIDTH] IN BLOOD BY AUTOMATED COUNT: 15.6 % (ref 10–15)
GFR SERPL CREATININE-BSD FRML MDRD: >90 ML/MIN/1.73M2
GFR SERPL CREATININE-BSD FRML MDRD: >90 ML/MIN/1.73M2
GLUCOSE SERPL-MCNC: 104 MG/DL (ref 70–99)
GLUCOSE SERPL-MCNC: 98 MG/DL (ref 70–99)
HCT VFR BLD AUTO: 28.9 % (ref 40–53)
HCT VFR BLD AUTO: 29.9 % (ref 40–53)
HGB BLD-MCNC: 9.5 G/DL (ref 13.3–17.7)
HGB BLD-MCNC: 9.9 G/DL (ref 13.3–17.7)
HOLD SPECIMEN: NORMAL
IMM GRANULOCYTES # BLD: 0 10E3/UL
IMM GRANULOCYTES NFR BLD: 0 %
INR PPP: 1.64 (ref 0.85–1.15)
LYMPHOCYTES # BLD AUTO: 1 10E3/UL (ref 0.8–5.3)
LYMPHOCYTES NFR BLD AUTO: 13 %
MAGNESIUM SERPL-MCNC: 1.4 MG/DL (ref 1.7–2.3)
MAGNESIUM SERPL-MCNC: 2.8 MG/DL (ref 1.7–2.3)
MCH RBC QN AUTO: 29.9 PG (ref 26.5–33)
MCH RBC QN AUTO: 30 PG (ref 26.5–33)
MCHC RBC AUTO-ENTMCNC: 32.9 G/DL (ref 31.5–36.5)
MCHC RBC AUTO-ENTMCNC: 33.1 G/DL (ref 31.5–36.5)
MCV RBC AUTO: 91 FL (ref 78–100)
MCV RBC AUTO: 91 FL (ref 78–100)
MONOCYTES # BLD AUTO: 0.9 10E3/UL (ref 0–1.3)
MONOCYTES NFR BLD AUTO: 11 %
NEUTROPHILS # BLD AUTO: 6 10E3/UL (ref 1.6–8.3)
NEUTROPHILS NFR BLD AUTO: 75 %
NRBC # BLD AUTO: 0 10E3/UL
NRBC BLD AUTO-RTO: 0 /100
PLATELET # BLD AUTO: 150 10E3/UL (ref 150–450)
PLATELET # BLD AUTO: 158 10E3/UL (ref 150–450)
POTASSIUM SERPL-SCNC: 3.5 MMOL/L (ref 3.4–5.3)
POTASSIUM SERPL-SCNC: 3.5 MMOL/L (ref 3.4–5.3)
RBC # BLD AUTO: 3.18 10E6/UL (ref 4.4–5.9)
RBC # BLD AUTO: 3.3 10E6/UL (ref 4.4–5.9)
SODIUM SERPL-SCNC: 133 MMOL/L (ref 136–145)
SODIUM SERPL-SCNC: 134 MMOL/L (ref 136–145)
UFH PPP CHRO-ACNC: <0.1 IU/ML
WBC # BLD AUTO: 6.6 10E3/UL (ref 4–11)
WBC # BLD AUTO: 8 10E3/UL (ref 4–11)

## 2022-10-15 PROCEDURE — 85025 COMPLETE CBC W/AUTO DIFF WBC: CPT

## 2022-10-15 PROCEDURE — 83735 ASSAY OF MAGNESIUM: CPT

## 2022-10-15 PROCEDURE — 250N000013 HC RX MED GY IP 250 OP 250 PS 637: Performed by: INTERNAL MEDICINE

## 2022-10-15 PROCEDURE — 214N000001 HC R&B CCU UMMC

## 2022-10-15 PROCEDURE — 85027 COMPLETE CBC AUTOMATED: CPT

## 2022-10-15 PROCEDURE — 93750 INTERROGATION VAD IN PERSON: CPT | Performed by: STUDENT IN AN ORGANIZED HEALTH CARE EDUCATION/TRAINING PROGRAM

## 2022-10-15 PROCEDURE — 80048 BASIC METABOLIC PNL TOTAL CA: CPT

## 2022-10-15 PROCEDURE — 258N000003 HC RX IP 258 OP 636

## 2022-10-15 PROCEDURE — 36415 COLL VENOUS BLD VENIPUNCTURE: CPT | Performed by: INTERNAL MEDICINE

## 2022-10-15 PROCEDURE — 250N000011 HC RX IP 250 OP 636: Performed by: STUDENT IN AN ORGANIZED HEALTH CARE EDUCATION/TRAINING PROGRAM

## 2022-10-15 PROCEDURE — 36415 COLL VENOUS BLD VENIPUNCTURE: CPT

## 2022-10-15 PROCEDURE — 93010 ELECTROCARDIOGRAM REPORT: CPT | Mod: 76 | Performed by: INTERNAL MEDICINE

## 2022-10-15 PROCEDURE — 93005 ELECTROCARDIOGRAM TRACING: CPT

## 2022-10-15 PROCEDURE — 250N000013 HC RX MED GY IP 250 OP 250 PS 637

## 2022-10-15 PROCEDURE — 85520 HEPARIN ASSAY: CPT | Performed by: INTERNAL MEDICINE

## 2022-10-15 PROCEDURE — 250N000013 HC RX MED GY IP 250 OP 250 PS 637: Performed by: STUDENT IN AN ORGANIZED HEALTH CARE EDUCATION/TRAINING PROGRAM

## 2022-10-15 PROCEDURE — 83735 ASSAY OF MAGNESIUM: CPT | Performed by: INTERNAL MEDICINE

## 2022-10-15 PROCEDURE — 250N000011 HC RX IP 250 OP 636

## 2022-10-15 PROCEDURE — 250N000011 HC RX IP 250 OP 636: Performed by: INTERNAL MEDICINE

## 2022-10-15 PROCEDURE — 85610 PROTHROMBIN TIME: CPT

## 2022-10-15 PROCEDURE — 86140 C-REACTIVE PROTEIN: CPT

## 2022-10-15 PROCEDURE — 99232 SBSQ HOSP IP/OBS MODERATE 35: CPT | Mod: 25 | Performed by: STUDENT IN AN ORGANIZED HEALTH CARE EDUCATION/TRAINING PROGRAM

## 2022-10-15 RX ORDER — HEPARIN SODIUM 10000 [USP'U]/100ML
0-5000 INJECTION, SOLUTION INTRAVENOUS CONTINUOUS
Status: DISCONTINUED | OUTPATIENT
Start: 2022-10-15 | End: 2022-10-18

## 2022-10-15 RX ORDER — POTASSIUM CHLORIDE 20MEQ/15ML
40 LIQUID (ML) ORAL ONCE
Status: DISCONTINUED | OUTPATIENT
Start: 2022-10-15 | End: 2022-10-15

## 2022-10-15 RX ORDER — ONDANSETRON 2 MG/ML
4 INJECTION INTRAMUSCULAR; INTRAVENOUS EVERY 6 HOURS PRN
Status: DISCONTINUED | OUTPATIENT
Start: 2022-10-15 | End: 2022-10-18 | Stop reason: HOSPADM

## 2022-10-15 RX ORDER — POTASSIUM CHLORIDE 29.8 MG/ML
20 INJECTION INTRAVENOUS
Status: DISCONTINUED | OUTPATIENT
Start: 2022-10-15 | End: 2022-10-15

## 2022-10-15 RX ORDER — POTASSIUM CHLORIDE 7.45 MG/ML
10 INJECTION INTRAVENOUS ONCE
Status: COMPLETED | OUTPATIENT
Start: 2022-10-15 | End: 2022-10-15

## 2022-10-15 RX ORDER — POTASSIUM CHLORIDE 29.8 MG/ML
20 INJECTION INTRAVENOUS ONCE
Status: DISCONTINUED | OUTPATIENT
Start: 2022-10-15 | End: 2022-10-15

## 2022-10-15 RX ORDER — LOPERAMIDE HCL 2 MG
2 CAPSULE ORAL ONCE
Status: COMPLETED | OUTPATIENT
Start: 2022-10-15 | End: 2022-10-15

## 2022-10-15 RX ORDER — POTASSIUM CHLORIDE 7.45 MG/ML
10 INJECTION INTRAVENOUS ONCE
Status: DISCONTINUED | OUTPATIENT
Start: 2022-10-16 | End: 2022-10-16

## 2022-10-15 RX ORDER — WARFARIN SODIUM 7.5 MG/1
7.5 TABLET ORAL
Status: COMPLETED | OUTPATIENT
Start: 2022-10-15 | End: 2022-10-15

## 2022-10-15 RX ORDER — MAGNESIUM SULFATE HEPTAHYDRATE 40 MG/ML
4 INJECTION, SOLUTION INTRAVENOUS ONCE
Status: COMPLETED | OUTPATIENT
Start: 2022-10-15 | End: 2022-10-15

## 2022-10-15 RX ORDER — POTASSIUM CHLORIDE 7.45 MG/ML
10 INJECTION INTRAVENOUS ONCE
Status: DISCONTINUED | OUTPATIENT
Start: 2022-10-15 | End: 2022-10-16

## 2022-10-15 RX ADMIN — SODIUM CHLORIDE 50 ML: 900 INJECTION INTRAVENOUS at 06:23

## 2022-10-15 RX ADMIN — ONDANSETRON 4 MG: 2 INJECTION INTRAMUSCULAR; INTRAVENOUS at 08:39

## 2022-10-15 RX ADMIN — Medication 1 TABLET: at 08:37

## 2022-10-15 RX ADMIN — ACETAMINOPHEN 975 MG: 325 TABLET, FILM COATED ORAL at 11:28

## 2022-10-15 RX ADMIN — ONDANSETRON 4 MG: 2 INJECTION INTRAMUSCULAR; INTRAVENOUS at 01:57

## 2022-10-15 RX ADMIN — HEPARIN SODIUM 750 UNITS/HR: 10000 INJECTION, SOLUTION INTRAVENOUS at 18:16

## 2022-10-15 RX ADMIN — MAGNESIUM SULFATE HEPTAHYDRATE 4 G: 40 INJECTION, SOLUTION INTRAVENOUS at 12:51

## 2022-10-15 RX ADMIN — ATORVASTATIN CALCIUM 40 MG: 40 TABLET, FILM COATED ORAL at 21:01

## 2022-10-15 RX ADMIN — SALINE NASAL SPRAY 2 SPRAY: 1.5 SOLUTION NASAL at 11:28

## 2022-10-15 RX ADMIN — METHYLCELLULOSE 1000 MG: 500 TABLET ORAL at 21:01

## 2022-10-15 RX ADMIN — POTASSIUM CHLORIDE 10 MEQ: 7.46 INJECTION, SOLUTION INTRAVENOUS at 22:38

## 2022-10-15 RX ADMIN — Medication 5 MG: at 21:40

## 2022-10-15 RX ADMIN — LISINOPRIL 10 MG: 10 TABLET ORAL at 08:38

## 2022-10-15 RX ADMIN — Medication 1 LOZENGE: at 08:39

## 2022-10-15 RX ADMIN — SALINE NASAL SPRAY 2 SPRAY: 1.5 SOLUTION NASAL at 21:40

## 2022-10-15 RX ADMIN — SERTRALINE 75 MG: 25 TABLET, FILM COATED ORAL at 08:37

## 2022-10-15 RX ADMIN — TAMSULOSIN HYDROCHLORIDE 0.4 MG: 0.4 CAPSULE ORAL at 08:39

## 2022-10-15 RX ADMIN — DIGOXIN 125 MCG: 125 TABLET ORAL at 08:38

## 2022-10-15 RX ADMIN — Medication 1 LOZENGE: at 01:57

## 2022-10-15 RX ADMIN — REMDESIVIR 100 MG: 100 INJECTION, POWDER, LYOPHILIZED, FOR SOLUTION INTRAVENOUS at 06:24

## 2022-10-15 RX ADMIN — LOPERAMIDE HYDROCHLORIDE 2 MG: 2 CAPSULE ORAL at 12:33

## 2022-10-15 RX ADMIN — ASPIRIN 81 MG CHEWABLE TABLET 81 MG: 81 TABLET CHEWABLE at 08:38

## 2022-10-15 RX ADMIN — WARFARIN SODIUM 7.5 MG: 7.5 TABLET ORAL at 18:18

## 2022-10-15 RX ADMIN — THIAMINE HCL TAB 100 MG 100 MG: 100 TAB at 11:28

## 2022-10-15 RX ADMIN — SALINE NASAL SPRAY 2 SPRAY: 1.5 SOLUTION NASAL at 18:21

## 2022-10-15 RX ADMIN — Medication 5 MG: at 00:18

## 2022-10-15 RX ADMIN — SODIUM CHLORIDE 500 ML: 9 INJECTION, SOLUTION INTRAVENOUS at 18:25

## 2022-10-15 ASSESSMENT — ACTIVITIES OF DAILY LIVING (ADL)
ADLS_ACUITY_SCORE: 29
ADLS_ACUITY_SCORE: 31
ADLS_ACUITY_SCORE: 35
ADLS_ACUITY_SCORE: 31
ADLS_ACUITY_SCORE: 39
ADLS_ACUITY_SCORE: 35
ADLS_ACUITY_SCORE: 31
ADLS_ACUITY_SCORE: 29

## 2022-10-15 NOTE — PLAN OF CARE
[id number]:    Pt is A/O x 4.   Independent.   VSS, RA. Minimal c/o pain. Declined needing pain meds.   Tele Sinus tach with runs of a fluttMD dahlia notified.   Lung sounds diminished throughout. Denies SOB.   Heparin gtt@ 750units. Next 10a @midnight.    LVAD WDL. Dressing CDI (changed on days).   BS active x4. Adequate urine output via urinal.  2gm Na diet, tolerating well. 2L FR.   Plans to continue COVID medication and reach therapeutic INR and discharge potentially once COVID medication course complete.   Patient currently resting in bed with call light in reach.

## 2022-10-15 NOTE — PROGRESS NOTES
[facility]    Cardiology Progress Note- Cardiology 2        Date of Admission:  10/13/2022     Assessment & Plan: HVSL   Dandy EDUARD Sands is a 61 year old male with a history of SLE, antiphospholipid syndrome, pulmonary embolism (on warfarin), ICM (EF 10-15%) s/p HM3 LVAD (7/28/22), and C. Diff infection who was admitted 10/14/2022 with 1 day of fever, body aches, nausea, vomiting, sore throat, and diarrhea, tested positive for COVID.       Changes made today:  - Gave him one dosage of Imodioum  - Holding the hydralazine   - Helding the hydroxychloroquine given interaction with remedesevir  - Given IV replacement of Magnesium    #COVID-19 infection  #History of COVID-19 infection in Jan 2022  Developed symptoms night of 10/12-10/13/2022 (fever, body aches, nausea, vomiting, sore throat, cough), tested positive in the ED 10/13/2022. He is on room air and afebrile on presentation. Had mild leukocytosis to 11.6 on admission. CXR without acute changes. Will treat symptomatically while inpatient, monitor for progression. Discussed with pharmacy and he qualifies for remdesivir, and patient is agreeable to this. Of note, he reports that he is vaccinated for COVID (Moderna 3/13/2021, 4/10/2021, 8/24/2021), and he had COVID Jan 2022.   - ordered remdesivir IV to be given while inpatient x3 doses (10/14-10/16)  - acetaminophen 975mg q8h prn  - AC with pta warfarin as below  - ordered CRP, D dimer, LDH  - holding pta Myfortic in the setting of infection for now  - Held the hydroxychloroquine given interaction with remdesevir    #Diarrhea  #History of C. Diff. Infection  Was recently admitted 9/18-9/29/2022 for diarrhea. ID was consulted at that time and did not think it was infectious diarrhea. GI was consulted and recommended stool bulking agents and he was started on methylcellulose. It does not sound like his diarrhea has worsened with current COVID infection. States that  he has been having recurrent diarrhea since morning. Will get testing done for C.diff given his recent hospital stay and recurrent diarrhea.   - continue pta methylcellulose 1000mg bid  - can consider testing for C. Diff. If diarrhea worsens.     #Stage D HFrEF 2/2 ICM (EF 10-15%) s/p HM3 LVAD (7/28/22) and CRT-D ('06)  #CAD s/p PCI to LAD ('05)  Hypervolemic on exam, no respiratory distress. Will defer diuresis at this time given low po intake and diarrhea and continue to monitor. States that he has been getting weakness and gain in his weight since Tuesday. The LVAD reading have been normal and there are no acute alarms on the LVAD. His BNP is 5061. Hemodynamically stable currently and will monitor accordingly.   - LVAD: LVAD speed 5100 (reduced from 5200 9/19/2022)  - Volume status: Hypervolemic  - Dry weight: was 134 lbs when he was discharged from hospital last 9/29/2022  - Weight on admission: 146 lbs  - GDMT              > BB: discontinued during a previous admission              > ACEi/ARB/ARNi: continue pta lisinopril 10 mg daily              > MRA: None               > SGLT2i: None   - SCD ppx: s/p CRT-D (2006)  - AC:    - Pharmacy to dose warfarin    - INR goal 2-3  - continue pta digoxin 125 mcg daily  - continue pta atorvastatin 40 mg daily  - Telemetry  - Strict I&Os  - Daily standing weights  - Ordered LDH, BNP     #SLE  #APS  #Hx of DVT/PE  On warfarin PTA (INR goal 2-3).   - Pharmacy to dose warfarin  - Discontinued the pta hydroxychloroquine 200mg bid given the concern for interaction with remedesvir  - holding pta Myfortic in the setting of infection for now     #HTN  - discontinued pta hydralazine 25mg TID given concern for hypotension  - continue pta lisinopril 10 mg daily     #Depression  #MIGUEL  - continue pta sertraline 75mg daily   - follow-up on pharmacy med rec about other pta medications and resume if still active (Ritalin, quetiapine...)- he had been on these medications before but are not  "on his current pta med list, he is unsure of what medications he takes currently (\"my son is my pharmacist\")     #Malnutrition  - continue pta thiamine 100mg daily     #BPH  - continue pta tamsulosin 0.4mg daily     Diet: 2 gm sodium diet     DVT Prophylaxis: Warfarin  Fitzgerald Catheter: Not present  Code Status: Full         Disposition Plan   Expected discharge: 2 - 3 days, recommended to prior living arrangement     The patient's care was discussed with the Attending Physician, Dr. Cherelle Prabhakar.    Lynn Merlos  Internal Medicine Resident   [facility]    ______________________________________________________________________    Interval History   No acute events over the night. I reviewed the nursing notes and he has been having diarrhea. He reports that he had one episode of vomiting since morning. He does not report of any chest pain or shortness of breath. He states that he has been getting sore throat, weakness and loss of appetite. He denies any new swelling over his feet or any dizziness or lightheadedness. He does not report any new concerns with his urination. The LVAD numbers have been within the normal limits.     Data reviewed today: I reviewed all medications, new labs and imaging results over the last 24 hours.    Physical Exam   Vital Signs: Temp: 97.8  F (36.6  C) Temp src: Oral BP: (!) 85/61 Pulse: 83   Resp: 17 SpO2: 97 % O2 Device: None (Room air)    Weight: 135 lbs 12.8 oz    General Appearance: siting in bed and seems uncomfortable  Respiratory: b/l chest is clear and there are no crackles or rales or wheezing  Cardiovascular: LVAD hum is present, JVD is not appreciable, extremities are warm and there is no pitting edema  GI: soft, non tender and there is no distention,no rebound tenderness  Skin: no lesions on exposed skin surface  Other:  Alert, answers all questions and mood is appropriate.    Data   Recent Labs   Lab 10/15/22  0724 " 10/14/22  2206 10/14/22  1742 10/14/22  1043 10/13/22  2050   WBC 8.0 9.3 10.0 11.0 11.6*   HGB 9.5* 9.8* 9.0* 10.2* 11.5*   MCV 91 91 91 94 94    159 151 159 203   INR 1.64*  --   --  1.67* 1.93*   *  --   --  136 136  136   POTASSIUM 3.5  --   --  3.8 4.0  4.0   CHLORIDE 103  --   --  104 102  102   CO2 17*  --   --  18* 19*  19*   BUN 14.7  --   --  11.9 10.1  10.1   CR 0.74  --   --  0.74 0.72  0.72   ANIONGAP 13  --   --  14 15  15   ELDA 8.6*  --   --  9.0 9.1  9.1   GLC 98  --   --  76 93  93   ALBUMIN  --   --  3.5  --  4.2   PROTTOTAL  --   --  6.4  --  7.7   BILITOTAL  --   --  0.5  --  0.7   ALKPHOS  --   --  82  --  100   ALT  --   --  16  --  19   AST  --   --  21  --  26   LIPASE  --   --   --   --  69*

## 2022-10-15 NOTE — PLAN OF CARE
VSS on RA. Lvad numbers wnl. Continues with nausea and frequent loose stools. Zofran and immodium given. Poor appetite, eating only few bites of breakfast. Magnesium replaced per protocol. Driveline dsg changed. Cards 2 primary, will continue POC.

## 2022-10-15 NOTE — PROGRESS NOTES
Care Coordinator  D/I: per chart recent discharge to [facility] with family and Edmar Muñoz  referral for HH RN.  P: I have sent Brady/Wanda  an email message that he has been readmitted and asking if he is open with them and if is will likely need a resumtpion. Will follow.

## 2022-10-15 NOTE — PROGRESS NOTES
SPIRITUAL HEALTH SERVICES  SPIRITUAL ASSESSMENT Progress Note  [facility] 6C     REFERRAL SOURCE: I did try to visit patient Dandy per [facility] . However, on my all visit attempts pt was busy by the unit staff and due to that reason, I didn't get a chance to visit the pt today.     PLAN: The Sunday on-call  will try to visit the pt again.    Susi Romero M.Div. ([place]), M.Th., D.Min., [facility]  Staff   Pager [phone]

## 2022-10-15 NOTE — PROGRESS NOTES
[facility]    Cardiology Progress Note- Cardiology 2        Date of Admission:  10/13/2022     Assessment & Plan: HVSL   Dandy EDUARD Sands is a 61 year old male with a history of SLE, antiphospholipid syndrome, pulmonary embolism (on warfarin), ICM (EF 10-15%) s/p HM3 LVAD (7/28/22), and C. Diff infection who was admitted 10/14/2022 with 1 day of fever, body aches, nausea, vomiting, sore throat, and diarrhea, tested positive for COVID.         Changes made today:  - held the hydralazine   - Held the hydroxychloroquine given interaction with remedesevir    -   #COVID-19 infection  #History of COVID-19 infection in Jan 2022  Developed symptoms night of 10/12-10/13/2022 (fever, body aches, nausea, vomiting, sore throat, cough), tested positive in the ED 10/13/2022. He is on room air and afebrile on presentation. Had mild leukocytosis to 11.6 on admission. CXR without acute changes. Will treat symptomatically while inpatient, monitor for progression. Discussed with pharmacy and he qualifies for remdesivir, and patient is agreeable to this. Of note, he reports that he is vaccinated for COVID (Moderna 3/13/2021, 4/10/2021, 8/24/2021), and he had COVID Jan 2022.   - ordered remdesivir IV to be given while inpatient x3 doses (10/14-10/16)  - acetaminophen 975mg q8h prn  - AC with pta warfarin as below  - ordered CRP, D dimer, LDH  - holding pta Myfortic in the setting of infection for now  - Held the hydroxychloroquine given interaction with remdesevir    #Diarrhea  #History of C. Diff. Infection  Was recently admitted 9/18-9/29/2022 for diarrhea. ID was consulted at that time and did not think it was infectious diarrhea. GI was consulted and recommended stool bulking agents and he was started on methylcellulose. It does not sound like his diarrhea has worsened with current COVID infection. States that he has been having recurrent diarrhea since morning. Will get testing  done for C.diff given his recent hospital stay and recurrent diarrhea.   - continue pta methylcellulose 1000mg bid  - can consider testing for C. Diff. If diarrhea worsens.     #Stage D HFrEF 2/2 ICM (EF 10-15%) s/p HM3 LVAD (7/28/22) and CRT-D ('06)  #CAD s/p PCI to LAD ('05)  Hypervolemic on exam, no respiratory distress. Will defer diuresis at this time given low po intake and diarrhea and continue to monitor. States that he has been getting weakness and gain in his weight since Tuesday. The LVAD reading have been normal and there are no acute alarms on the LVAD. His BNP is 5061. Hemodynamically stable currently and will monitor accordingly.   - LVAD: LVAD speed 5100 (reduced from 5200 9/19/2022)  - Volume status: Hypervolemic  - Dry weight: was 134 lbs when he was discharged from hospital last 9/29/2022  - Weight on admission: 146 lbs  - GDMT              > BB: discontinued during a previous admission              > ACEi/ARB/ARNi: continue pta lisinopril 10 mg daily              > MRA: None               > SGLT2i: None   - SCD ppx: s/p CRT-D (2006)  - AC:    - Pharmacy to dose warfarin    - INR goal 2-3  - continue pta digoxin 125 mcg daily  - continue pta atorvastatin 40 mg daily  - Telemetry  - Strict I&Os  - Daily standing weights  - Ordered LDH, BNP     #SLE  #APS  #Hx of DVT/PE  On warfarin PTA (INR goal 2-3).   - Pharmacy to dose warfarin  - Discontinued the pta hydroxychloroquine 200mg bid given the concern for interaction with remedesvir  - holding pta Myfortic in the setting of infection for now     #HTN  - discontinued pta hydralazine 25mg TID given concern for hypotension  - continue pta lisinopril 10 mg daily     #Depression  #MIGUEL  - continue pta sertraline 75mg daily   - follow-up on pharmacy med rec about other pta medications and resume if still active (Ritalin, quetiapine...)- he had been on these medications before but are not on his current pta med list, he is unsure of what medications he  "takes currently (\"my son is my pharmacist\")     #Malnutrition  - continue pta thiamine 100mg daily     #BPH  - continue pta tamsulosin 0.4mg daily     Diet:     DVT Prophylaxis: Warfarin  Fitzgerald Catheter: Not present  Code Status: Full        Disposition Plan   Expected discharge: 4 - 7 days, recommended to prior living arrangement once fluid volume status optimized on oral medication and covid negative      The patient's care was discussed with the Attending Physician, Dr. Layne Prabhakar.    Lynn Merlos  Internal Medicine Resident   [facility]    ______________________________________________________________________    Interval History   He has been complaining of recurrent diarrhea today. Does not report of any chest pain or shortness of breath. Does not report of any abdominal pain but has recurrent diarrhea. Does not complain of any dizziness or loss of consciousness. Denies any LE edema, exertional dyspnea/orthopnea/PND.    Data reviewed today: I reviewed all medications, new labs and imaging results over the last 24 hours.    Physical Exam   Vital Signs: Temp: 98.9  F (37.2  C) Temp src: Oral BP: (!) 75/49 Pulse: 87   Resp: 11 SpO2: 94 % O2 Device: None (Room air)    Weight: 146 lbs 0 oz  General Appearance: lting in bed and seems uncomfortable  Respiratory: b/l chest is clear and there are no crackles or rales or wheezing  Cardiovascular: LVAD hum is present, JVD is not appreciable, extremities are warm and there is no pitting edema  GI: soft, non tender and there is no distention,no rebound tenderness  Skin: no lesions on exposed skin surface  Other: Alert, answers all questions and mood is appropriate.    Data   Recent Labs   Lab 10/14/22  1742 10/14/22  1043 10/13/22  2050 10/12/22  0000   WBC 10.0 11.0 11.6*  --    HGB 9.0* 10.2* 11.5*  --    MCV 91 94 94  --     159 203  --    INR  --  1.67* 1.93* 2.1*   NA  --  136 136  136  --  "   POTASSIUM  --  3.8 4.0  4.0  --    CHLORIDE  --  104 102  102  --    CO2  --  18* 19*  19*  --    BUN  --  11.9 10.1  10.1  --    CR  --  0.74 0.72  0.72  --    ANIONGAP  --  14 15  15  --    ELDA  --  9.0 9.1  9.1  --    GLC  --  76 93  93  --    ALBUMIN  --   --  4.2  --    PROTTOTAL  --   --  7.7  --    BILITOTAL  --   --  0.7  --    ALKPHOS  --   --  100  --    ALT  --   --  19  --    AST  --   --  26  --    LIPASE  --   --  69*  --

## 2022-10-15 NOTE — PROGRESS NOTES
Admission    Diagnosis: Covid infection  Admitted from: [facility]  Via: [facility]  Accompanied by: RN  Belongings: Clothes and cell phone. No meds. Declined sending items to security.  Admission Profile: Complete  Teaching: orientation to unit, call don't fall, use of console, meal times, visiting hours, when to call for the RN (angina/sob/dizziness, etc.), and enforced importance of safety   Access: PIVx2  Telemetry: Placed on patient  Height/Weight: Complete  2 RN skin assessment completed with Cabrera SHARMA. Skin notable for generalized bruising, surgical scars, and blanchable redness on sacrum.

## 2022-10-15 NOTE — PLAN OF CARE
D: Dandy Sands is a 61 year old male with a history of SLE, antiphospholipid syndrome, pulmonary embolism (on warfarin), ICM (EF 10-15%) s/p HM3 LVAD (7/28/22), and admitted 10/14/2022 with 1 day of fever, body aches, nausea, vomiting, sore throat, and diarrhea, tested positive for COVID. He was recently admitted from 9/18-9/29/2022 with low flow alarms and ongoing profuse diarrhea. Recent C.diff test was negative.      PRN: Zofran x1  Tele: SR - tach w/ frequent PVCs  O2: RA  Mobility: Ind-SBA     A: Neuro: A/O x4.  Call light appropriate.  Able to make needs known.  Respiratory:  On room air.  Lung sounds clear.  Denies shortness of breath at rest.  Cardiac: VSS. LVAD hum. Tachycardia.   GI: Last BM 10/15 - loose.  Intermittent nausea. Not much appetite except for popsicles & ice cream.  : Voiding with mixed stool  Skin: generalized bruising and blanchable redness on sacrum  LDA:  L & R PIV SL.   Pain: Stomach pain - tried zofran & going to the bathroom  Isolation: Special Precautions - COVID  Diet: Regular     P: Continue to monitor Pt status and report changes to Cards 2.      Goal Outcome Evaluation:      Plan of Care Reviewed With: patient    Overall Patient Progress: no changeOverall Patient Progress: no change    Outcome Evaluation: Pt still having nausea and loose stools.

## 2022-10-16 LAB
ANION GAP SERPL CALCULATED.3IONS-SCNC: 11 MMOL/L (ref 7–15)
BASE EXCESS BLDV CALC-SCNC: -6.4 MMOL/L (ref -7.7–1.9)
BASOPHILS # BLD AUTO: 0 10E3/UL (ref 0–0.2)
BASOPHILS NFR BLD AUTO: 1 %
BUN SERPL-MCNC: 18.3 MG/DL (ref 8–23)
CALCIUM SERPL-MCNC: 8 MG/DL (ref 8.8–10.2)
CHLORIDE SERPL-SCNC: 108 MMOL/L (ref 98–107)
CREAT SERPL-MCNC: 0.71 MG/DL (ref 0.67–1.17)
CRP SERPL-MCNC: 70.8 MG/L
DEPRECATED HCO3 PLAS-SCNC: 15 MMOL/L (ref 22–29)
EOSINOPHIL # BLD AUTO: 0.2 10E3/UL (ref 0–0.7)
EOSINOPHIL NFR BLD AUTO: 4 %
ERYTHROCYTE [DISTWIDTH] IN BLOOD BY AUTOMATED COUNT: 15.6 % (ref 10–15)
GFR SERPL CREATININE-BSD FRML MDRD: >90 ML/MIN/1.73M2
GLUCOSE SERPL-MCNC: 89 MG/DL (ref 70–99)
HCO3 BLDV-SCNC: 19 MMOL/L (ref 21–28)
HCT VFR BLD AUTO: 29.9 % (ref 40–53)
HGB BLD-MCNC: 9.5 G/DL (ref 13.3–17.7)
IMM GRANULOCYTES # BLD: 0 10E3/UL
IMM GRANULOCYTES NFR BLD: 0 %
INR PPP: 2.32 (ref 0.85–1.15)
INR PPP: 2.35 (ref 0.85–1.15)
LDH SERPL L TO P-CCNC: 210 U/L (ref 0–250)
LYMPHOCYTES # BLD AUTO: 1 10E3/UL (ref 0.8–5.3)
LYMPHOCYTES NFR BLD AUTO: 16 %
MAGNESIUM SERPL-MCNC: 2 MG/DL (ref 1.7–2.3)
MCH RBC QN AUTO: 29.4 PG (ref 26.5–33)
MCHC RBC AUTO-ENTMCNC: 31.8 G/DL (ref 31.5–36.5)
MCV RBC AUTO: 93 FL (ref 78–100)
MONOCYTES # BLD AUTO: 0.7 10E3/UL (ref 0–1.3)
MONOCYTES NFR BLD AUTO: 12 %
NEUTROPHILS # BLD AUTO: 4 10E3/UL (ref 1.6–8.3)
NEUTROPHILS NFR BLD AUTO: 67 %
NRBC # BLD AUTO: 0 10E3/UL
NRBC BLD AUTO-RTO: 0 /100
O2/TOTAL GAS SETTING VFR VENT: 21 %
OXYHGB MFR BLDV: 96 % (ref 70–75)
PCO2 BLDV: 35 MM HG (ref 40–50)
PH BLDV: 7.34 [PH] (ref 7.32–7.43)
PLATELET # BLD AUTO: 161 10E3/UL (ref 150–450)
PO2 BLDV: 77 MM HG (ref 25–47)
POTASSIUM SERPL-SCNC: 4.1 MMOL/L (ref 3.4–5.3)
RBC # BLD AUTO: 3.23 10E6/UL (ref 4.4–5.9)
SODIUM SERPL-SCNC: 134 MMOL/L (ref 136–145)
UFH PPP CHRO-ACNC: 0.31 IU/ML
UFH PPP CHRO-ACNC: 0.36 IU/ML
WBC # BLD AUTO: 6 10E3/UL (ref 4–11)

## 2022-10-16 PROCEDURE — 83735 ASSAY OF MAGNESIUM: CPT

## 2022-10-16 PROCEDURE — 214N000001 HC R&B CCU UMMC

## 2022-10-16 PROCEDURE — 36415 COLL VENOUS BLD VENIPUNCTURE: CPT

## 2022-10-16 PROCEDURE — 93750 INTERROGATION VAD IN PERSON: CPT | Performed by: STUDENT IN AN ORGANIZED HEALTH CARE EDUCATION/TRAINING PROGRAM

## 2022-10-16 PROCEDURE — 258N000003 HC RX IP 258 OP 636

## 2022-10-16 PROCEDURE — 250N000011 HC RX IP 250 OP 636

## 2022-10-16 PROCEDURE — 250N000013 HC RX MED GY IP 250 OP 250 PS 637: Performed by: STUDENT IN AN ORGANIZED HEALTH CARE EDUCATION/TRAINING PROGRAM

## 2022-10-16 PROCEDURE — 85610 PROTHROMBIN TIME: CPT

## 2022-10-16 PROCEDURE — 86140 C-REACTIVE PROTEIN: CPT

## 2022-10-16 PROCEDURE — 250N000013 HC RX MED GY IP 250 OP 250 PS 637

## 2022-10-16 PROCEDURE — 85025 COMPLETE CBC W/AUTO DIFF WBC: CPT

## 2022-10-16 PROCEDURE — 83615 LACTATE (LD) (LDH) ENZYME: CPT

## 2022-10-16 PROCEDURE — 250N000013 HC RX MED GY IP 250 OP 250 PS 637: Performed by: INTERNAL MEDICINE

## 2022-10-16 PROCEDURE — 83010 ASSAY OF HAPTOGLOBIN QUANT: CPT

## 2022-10-16 PROCEDURE — 99232 SBSQ HOSP IP/OBS MODERATE 35: CPT | Mod: 25 | Performed by: STUDENT IN AN ORGANIZED HEALTH CARE EDUCATION/TRAINING PROGRAM

## 2022-10-16 PROCEDURE — 85520 HEPARIN ASSAY: CPT | Performed by: INTERNAL MEDICINE

## 2022-10-16 PROCEDURE — 36415 COLL VENOUS BLD VENIPUNCTURE: CPT | Performed by: INTERNAL MEDICINE

## 2022-10-16 PROCEDURE — 82805 BLOOD GASES W/O2 SATURATION: CPT

## 2022-10-16 PROCEDURE — 80048 BASIC METABOLIC PNL TOTAL CA: CPT

## 2022-10-16 RX ORDER — WARFARIN SODIUM 5 MG/1
5 TABLET ORAL
Status: COMPLETED | OUTPATIENT
Start: 2022-10-16 | End: 2022-10-16

## 2022-10-16 RX ORDER — POTASSIUM CHLORIDE 750 MG/1
30 TABLET, EXTENDED RELEASE ORAL ONCE
Status: COMPLETED | OUTPATIENT
Start: 2022-10-16 | End: 2022-10-16

## 2022-10-16 RX ORDER — LOPERAMIDE HCL 2 MG
2 CAPSULE ORAL 2 TIMES DAILY
Status: DISCONTINUED | OUTPATIENT
Start: 2022-10-16 | End: 2022-10-16

## 2022-10-16 RX ORDER — HYDROXYZINE HYDROCHLORIDE 10 MG/1
10 TABLET, FILM COATED ORAL 3 TIMES DAILY PRN
Status: DISCONTINUED | OUTPATIENT
Start: 2022-10-16 | End: 2022-10-18 | Stop reason: HOSPADM

## 2022-10-16 RX ORDER — ZINC SULFATE 50(220)MG
220 CAPSULE ORAL DAILY
Status: DISCONTINUED | OUTPATIENT
Start: 2022-10-16 | End: 2022-10-18 | Stop reason: HOSPADM

## 2022-10-16 RX ORDER — LOPERAMIDE HCL 2 MG
2 CAPSULE ORAL 2 TIMES DAILY PRN
Status: DISCONTINUED | OUTPATIENT
Start: 2022-10-16 | End: 2022-10-18 | Stop reason: HOSPADM

## 2022-10-16 RX ADMIN — REMDESIVIR 100 MG: 100 INJECTION, POWDER, LYOPHILIZED, FOR SOLUTION INTRAVENOUS at 06:03

## 2022-10-16 RX ADMIN — ACETAMINOPHEN 975 MG: 325 TABLET, FILM COATED ORAL at 21:23

## 2022-10-16 RX ADMIN — THIAMINE HCL TAB 100 MG 100 MG: 100 TAB at 08:36

## 2022-10-16 RX ADMIN — TAMSULOSIN HYDROCHLORIDE 0.4 MG: 0.4 CAPSULE ORAL at 08:36

## 2022-10-16 RX ADMIN — Medication 5 MG: at 21:23

## 2022-10-16 RX ADMIN — WARFARIN SODIUM 5 MG: 5 TABLET ORAL at 16:55

## 2022-10-16 RX ADMIN — LISINOPRIL 10 MG: 10 TABLET ORAL at 08:36

## 2022-10-16 RX ADMIN — Medication 1 TABLET: at 08:36

## 2022-10-16 RX ADMIN — SALINE NASAL SPRAY 2 SPRAY: 1.5 SOLUTION NASAL at 14:27

## 2022-10-16 RX ADMIN — SALINE NASAL SPRAY 2 SPRAY: 1.5 SOLUTION NASAL at 06:14

## 2022-10-16 RX ADMIN — ZINC SULFATE 220 MG (50 MG) CAPSULE 220 MG: CAPSULE at 14:27

## 2022-10-16 RX ADMIN — METHYLCELLULOSE 1000 MG: 500 TABLET ORAL at 19:52

## 2022-10-16 RX ADMIN — METHYLCELLULOSE 1000 MG: 500 TABLET ORAL at 08:37

## 2022-10-16 RX ADMIN — SALINE NASAL SPRAY 2 SPRAY: 1.5 SOLUTION NASAL at 21:24

## 2022-10-16 RX ADMIN — HYDROXYZINE HYDROCHLORIDE 10 MG: 10 TABLET ORAL at 23:01

## 2022-10-16 RX ADMIN — SALINE NASAL SPRAY 2 SPRAY: 1.5 SOLUTION NASAL at 16:55

## 2022-10-16 RX ADMIN — POTASSIUM CHLORIDE 30 MEQ: 750 TABLET, EXTENDED RELEASE ORAL at 00:51

## 2022-10-16 RX ADMIN — DIGOXIN 125 MCG: 125 TABLET ORAL at 08:36

## 2022-10-16 RX ADMIN — HEPARIN SODIUM 1050 UNITS/HR: 10000 INJECTION, SOLUTION INTRAVENOUS at 14:28

## 2022-10-16 RX ADMIN — SERTRALINE 75 MG: 25 TABLET, FILM COATED ORAL at 08:36

## 2022-10-16 RX ADMIN — SODIUM CHLORIDE 50 ML: 900 INJECTION INTRAVENOUS at 06:05

## 2022-10-16 RX ADMIN — SALINE NASAL SPRAY 2 SPRAY: 1.5 SOLUTION NASAL at 08:41

## 2022-10-16 RX ADMIN — ASPIRIN 81 MG CHEWABLE TABLET 81 MG: 81 TABLET CHEWABLE at 08:37

## 2022-10-16 RX ADMIN — ATORVASTATIN CALCIUM 40 MG: 40 TABLET, FILM COATED ORAL at 19:52

## 2022-10-16 RX ADMIN — ACETAMINOPHEN 975 MG: 325 TABLET, FILM COATED ORAL at 08:48

## 2022-10-16 ASSESSMENT — ACTIVITIES OF DAILY LIVING (ADL)
ADLS_ACUITY_SCORE: 39
ADLS_ACUITY_SCORE: 35
ADLS_ACUITY_SCORE: 39
ADLS_ACUITY_SCORE: 35
ADLS_ACUITY_SCORE: 35
ADLS_ACUITY_SCORE: 39
ADLS_ACUITY_SCORE: 39
ADLS_ACUITY_SCORE: 35
ADLS_ACUITY_SCORE: 39

## 2022-10-16 NOTE — PROGRESS NOTES
Updates:     Pt in aflutter 80s-100s BBB with 0-20 PVCs/min. Potassium replaced PO per 1 time order    Low flows noted in high 2's, no alarms noted. MAPs 70s-80s. Heparin gtt at 1050 units/hr pending 10a recheck    Last dose of Remdesivir completed. Pt appearing very depressed about having Covid twice and being stuck in the hospital w/ no visitors    Nausea, diarrhea and appetite all improving. Pt wondering about discharge and isolation status.

## 2022-10-16 NOTE — PLAN OF CARE
MD Notification    Notified Person: MD    Notified Person Name:  Merlos     Notification Date/Time: 10/16/22 7864    Notification Interaction: Telephone    Purpose of Notification: FYI page that patient had low flow alarm of 1.4 when up ambulating to BR. Patient now sitting around 2.8-3.0.     Orders Received:     Comments: Verified speed at 5100, continue to monitor.

## 2022-10-16 NOTE — PLAN OF CARE
Pt is A/O x 4.   Independent.   VSS, RA. Denies pain.   Tele A flutter with BBB.   Lung sounds diminished in bases. Denies SOB.   Heparin gtt@ 1050, next 10a 10/17 in AM.   LVAD WDL. Dressing changed and drive line site CDI.   BS active x4. Loose stools have slowed down per pt report. Adequate urine output via urinal  Regular diet, tolerating well.  Plans to finish medication for treatment of COVID symptoms and discharge back to prior living arrangement.   Patient currently resting in bed with call light in reach.

## 2022-10-16 NOTE — PROGRESS NOTES
"SPIRITUAL HEALTH SERVICES Progress Note  [facility] 6C    I met with Dandy per admission request and provided him with a birthday card. He expressed about how \"I'm doing better today\" and looking forward to speaking with his children. Dandy shared some of his recent health history and many stays in the hospital and looking forward to discharging. Dandy requested prayer for his healing which I provided.    Artis Nava  Chaplain Resident  Pager [phone]      * [facility] remains available 24/7 for emergent requests/referrals, either by having the switchboard page the on-call  or by entering an ASAP/STAT consult in Epic (this will also page the on-call ). Routine Epic consults receive an initial response within 24 hours.*    "

## 2022-10-16 NOTE — PLAN OF CARE
D: Dandy Sands is a 61 year old male with a history of SLE, antiphospholipid syndrome, pulmonary embolism (on warfarin), ICM (EF 10-15%) s/p HM3 LVAD (7/28/22), and admitted 10/14/2022 with 1 day of fever, body aches, nausea, vomiting, sore throat, and diarrhea, tested positive for COVID. He was recently admitted from 9/18-9/29/2022 with low flow alarms and ongoing profuse diarrhea. Recent C.diff test was negative.      PRN:   Tele: Intermittent A flutter w/ frequent PVCs 0-20/min starting at 0400.  O2: RA  Mobility: Ind-SBA     A: Neuro: A/O x4.  Call light appropriate.  Able to make needs known.  Respiratory:  On room air.  Lung sounds clear.  Denies shortness of breath at rest.  Cardiac: VSS. LVAD hum. A flutter w frequent PVCs  GI: Last BM 10/15 - loose.  Not a huge appetite, but improved since yesterday.  : Voiding with mixed stool  Skin: generalized bruising and blanchable redness on sacrum  LDA:  L & R PIV SL.   Pain: shoulder & back - pt states that its normal.  Isolation: Special Precautions - COVID  Diet: Regular  Electrolytes: Pt to get K+ solution, refused so we tried IV K+, but that burned so pt ended up taking K+ pills PO.     P: Continue to monitor Pt status and report changes to Cards 2.    Goal Outcome Evaluation:      Plan of Care Reviewed With: patient    Overall Patient Progress: improvingOverall Patient Progress: improving    Outcome Evaluation: No nausea & less loose stools reported.

## 2022-10-16 NOTE — PROGRESS NOTES
"CLINICAL NUTRITION SERVICES - ASSESSMENT NOTE     Nutrition Prescription    Malnutrition Status:   Severe malnutrition in the setting of acute on chronic illness    Recommendations already ordered by Registered Dietitian (RD):  Gelatein Plus TID between meals  ZnSo4 220 mg x 14 days  Continue thera vit M    Future/Additional Recommendations:  Monitor po / ONS acceptance / weight trends     REASON FOR ASSESSMENT  Dandy Sands is a/an 62 year old male assessed by the dietitian for Provider Order - \"Suspect malnutrition\"    Pt with a history of SLE, antiphospholipid syndrome, pulmonary embolism (on warfarin), ICM (EF 10-15%) s/p HM3 LVAD (7/28/22), and C. Diff infection who was admitted 10/14/2022 with 1 day of fever, body aches, nausea, vomiting, sore throat, and diarrhea, tested positive for COVID    NUTRITION HISTORY  Per chart review:    - Pt with several weeks of frequent watery diarrhea.   - RN notes suggest improving appetite yesterday.  - Recent admission 9/18 - 9/29: ID and GI consulted, started on methylcellulose.  Per RD note, pt on liberal diet,  accepting of special K bars BID, had been avoiding dairy due to loose stools, and was consuming % meals.    Per visit with patient:  - Primary complaint is lack of variety of food choices with 2gm Na restriction - for example, wanted two dinner rolls with meal and only allowed one  - Did not order breakfast and not excited about lunch because of diet restriction  - Appetite improved.  Last episode of emesis 10/15.  stooling less frequently - was stooling every hour previously.  - Not interested in special K bars.  Discussed alternative supplements and pt interested in gelatein    CURRENT NUTRITION ORDERS  Diet: 2 g Sodium 2000ml FR -- > changed to regular    LABS  Labs reviewed  Cdiff negative    MEDICATIONS  Medications reviewed and include:  remdesivir  lipitor  Methylcellulose  Thera vit M  K+ and Mg (IV) replacements  Thiamine 100 mg  PRN zofran - last " "given 10/15    ANTHROPOMETRICS  Height: 170.2 cm (5' 7\")  Most Recent Weight: 60.6 kg (133 lb 9.6 oz)    IBW: 67 kg (91% IBW)  BMI: Normal BMI (low end normal @ 20.92 kg/m2)    Weight History: dry weight 134 lbs (9/29 upon discharge from recent adm) - usual weight 185 lbs 8 months ago - 27% weight loss in < one year (severe) - although suspect some degree of diuresis with fairly recent LVAD.    Wt Readings from Last 10 Encounters:   10/16/22 60.6 kg (133 lb 9.6 oz)   10/07/22 63.6 kg (140 lb 3.2 oz)   09/29/22 60.8 kg (134 lb 0.6 oz)   09/15/22 61.2 kg (134 lb 14.7 oz)   09/04/22 59.1 kg (130 lb 6.4 oz)   08/21/22 59.3 kg (130 lb 12.8 oz)   07/08/22 62.6 kg (138 lb)   06/30/22 65.8 kg (145 lb)   05/29/22 65 kg (143 lb 4.8 oz)   05/26/22 64.6 kg (142 lb 8 oz)       Dosing Weight: 61 kg    ASSESSED NUTRITION NEEDS  Estimated Energy Needs: 1830 - 2135 kcals/day (30 - 35 kcals/kg )  Justification: Repletion and Underweight  Estimated Protein Needs: 73 - 92 grams protein/day (1.2 - 1.5 grams of pro/kg)  Justification: Hypercatabolism with acute illness  Estimated Fluid Needs: 1 mL/kcal  Justification: Per provider pending fluid status    MALNUTRITION  % Intake: Decreased intake does not meet criteria  % Weight Loss: > 10% in 6 months (severe)  Subcutaneous Fat Loss: Facial region:  moderate and Thoracic/intercostal:  Moderate.Per visual assessment, NFPE not performed as pt with other provider at time of visit   Muscle Loss: Temporal:  moderate and Thoracic region (clavicle, acromium bone, deltoid, trapezius, pectoral):  moderate  Fluid Accumulation/Edema: Does not meet criteria  Malnutrition Diagnosis: Severe malnutrition in the context of acute on chronic illness    NUTRITION DIAGNOSIS  Malnutrition related to decrease in po and GI symptoms as evidenced by severe weight loss (diuresis + true weight loss), visible fat and muscle wasting      INTERVENTIONS  Implementation  Collaboration with other providers - Paged " provider (cards 2) to request liberalized diet, diet changed to regular  Medical food supplement therapy - gelatain plus TID  Ordered Zinc supplementation x 14 days as pt likely deficient with ongoing stool losses  Encouraged po intake / protein intake.  Discussed that zofran is available PRN.    Goals  Patient to consume % of nutritionally adequate meal trays TID, or the equivalent with supplements/snacks.     Monitoring/Evaluation  Progress toward goals will be monitored and evaluated per protocol.    Arely Le, RD, LD, [facility]  6C RD Pager: [phone]  Weekend/Holiday RD pager: [phone]

## 2022-10-16 NOTE — PROGRESS NOTES
[facility]    Cardiology Progress Note- Cardiology        Date of Admission:  10/13/2022     Assessment & Plan: HVSL   Dandy EDUARD Sands is a 61 year old male with a history of SLE, antiphospholipid syndrome, pulmonary embolism (on warfarin), ICM (EF 10-15%) s/p HM3 LVAD (7/28/22), and C. Diff infection who was admitted 10/14/2022 with 1 day of fever, body aches, nausea, vomiting, sore throat, and diarrhea, tested positive for COVID.       Changes made today:  - Imodium BiD PRN given  - Holding the hydralazine   - Helding the hydroxychloroquine given interaction with remedesevir  - Replaced K in solution form  - Continue Heparin    #COVID-19 infection  #History of COVID-19 infection in Jan 2022  Developed symptoms night of 10/12-10/13/2022 (fever, body aches, nausea, vomiting, sore throat, cough), tested positive in the ED 10/13/2022. He is on room air and afebrile on presentation. Had mild leukocytosis to 11.6 on admission. CXR without acute changes. Will treat symptomatically while inpatient, monitor for progression. Discussed with pharmacy and he qualifies for remdesivir, and patient is agreeable to this. Of note, he reports that he is vaccinated for COVID (Moderna 3/13/2021, 4/10/2021, 8/24/2021), and he had COVID Jan 2022.   - Remdesivir IV given while inpatient x3 doses (10/14-10/16)  - Acetaminophen 975mg q8h prn  - AC with pta warfarin as below  - CRP has been down trending  - holding pta Myfortic in the setting of infection for now  - Held the hydroxychloroquine given interaction with remdesevir    #Diarrhea  #History of C. Diff. Infection  Was recently admitted 9/18-9/29/2022 for diarrhea. ID was consulted at that time and did not think it was infectious diarrhea. GI was consulted and recommended stool bulking agents and he was started on methylcellulose. It does not sound like his diarrhea has worsened with current COVID infection. States that he has been  having recurrent diarrhea since morning. Will get testing done for C.diff given his recent hospital stay and recurrent diarrhea.   - continue pta methylcellulose 1000mg bid  - can consider testing for C. Diff. If diarrhea worsens.     #Stage D HFrEF 2/2 ICM (EF 10-15%) s/p HM3 LVAD (7/28/22) and CRT-D ('06)  #CAD s/p PCI to LAD ('05)  Hypervolemic on exam, no respiratory distress. Will defer diuresis at this time given low po intake and diarrhea and continue to monitor. States that he has been getting weakness and gain in his weight since Tuesday. The LVAD reading have been normal and there are no acute alarms on the LVAD. His BNP is 5061. Hemodynamically stable currently and will monitor accordingly.   - LVAD: LVAD speed 5100 (reduced from 5200 9/19/2022)  - Volume status: Hypervolemic  - Dry weight: was 134 lbs when he was discharged from hospital last 9/29/2022  - Weight on admission: 146 lbs  - GDMT              > BB: discontinued during a previous admission              > ACEi/ARB/ARNi: continue pta lisinopril 10 mg daily              > MRA: None               > SGLT2i: None   - SCD ppx: s/p CRT-D (2006)  - AC:    - Pharmacy to dose warfarin , Heparin to get the INR in therapeutic range   - INR goal 2-3  - continue pta digoxin 125 mcg daily  - continue pta atorvastatin 40 mg daily  - Telemetry  - Strict I&Os  - Daily standing weights       #SLE  #APS  #Hx of DVT/PE  On warfarin PTA (INR goal 2-3).   - Pharmacy to dose warfarin  - Discontinued the pta hydroxychloroquine 200mg bid given the concern for interaction with remedesvir  - holding pta Myfortic in the setting of infection for now     #HTN  - discontinued pta hydralazine 25mg TID given concern for hypotension  - continue pta lisinopril 10 mg daily     #Depression  #MIGUEL  - continue pta sertraline 75mg daily     #Malnutrition  - continue pta thiamine 100mg daily     #BPH  - continue pta tamsulosin 0.4mg daily      Diet: Cardiac Diet , Nutrition consulted    DVT Prophylaxis: Warfarin, Heparin  Fitzgerald Catheter: Not present  Code Status: Full        Disposition Plan   Expected discharge: > 7 days, recommended to prior living arrangement once isolation ends.    The patient's care was discussed with Dr. Vanna Merlos  Internal Medicine Resident PGY1  [facility]    ______________________________________________________________________    Interval History   No acute events over the night. I reviewed the nursing notes. He had some EKG changes over the night but he has been asymptomatic and his electrolytes have been stable. Currently denies any chest pain or shortness of breath. States that his loose stools have decreased to 4 times since yesterday and he has been feeling better with no nausea. The body ache has decreased and there is lesser weakness than before.     No LVAD alarms over the night  Flow:2.9 lpm  PI: 6.6  Speed: 5100 rpm  Power: 3.4 W      Data reviewed today: I reviewed all medications, new labs and imaging results over the last 24 hours.      Physical Exam   Vital Signs: Temp: 97.1  F (36.2  C) Temp src: Axillary BP: (!) 67/54 Pulse: 83   Resp: 18 SpO2: 96 % O2 Device: None (Room air)    Weight: 133 lbs 9.6 oz     General Appearance: siting in bed and seems uncomfortable  Respiratory: b/l chest is clear and there are no crackles or rales or wheezing  Cardiovascular: LVAD hum is present, JVD is not appreciable, extremities are warm and there is no pitting edema  GI: soft, non tender and there is no distention,no rebound tenderness  Skin: generalized bruising, surgical scars  Other:  Alert, answers all questions and mood is appropriate    Data   Recent Labs   Lab 10/16/22  0640 10/15/22  1733 10/15/22  0724 10/14/22  2206 10/14/22  1742 10/14/22  1043 10/13/22  2050   WBC 6.0 6.6 8.0   < > 10.0 11.0 11.6*   HGB 9.5* 9.9* 9.5*   < > 9.0* 10.2* 11.5*   MCV 93 91 91   < >  91 94 94    158 150   < > 151 159 203   INR 2.35*  --  1.64*  --   --  1.67* 1.93*   * 134* 133*  --   --  136 136  136   POTASSIUM 4.1 3.5 3.5  --   --  3.8 4.0  4.0   CHLORIDE 108* 105 103  --   --  104 102  102   CO2 15* 18* 17*  --   --  18* 19*  19*   BUN 18.3 16.1 14.7  --   --  11.9 10.1  10.1   CR 0.71 0.69 0.74  --   --  0.74 0.72  0.72   ANIONGAP 11 11 13  --   --  14 15  15   ELDA 8.0* 8.5* 8.6*  --   --  9.0 9.1  9.1   GLC 89 104* 98  --   --  76 93  93   ALBUMIN  --   --   --   --  3.5  --  4.2   PROTTOTAL  --   --   --   --  6.4  --  7.7   BILITOTAL  --   --   --   --  0.5  --  0.7   ALKPHOS  --   --   --   --  82  --  100   ALT  --   --   --   --  16  --  19   AST  --   --   --   --  21  --  26   LIPASE  --   --   --   --   --   --  69*    < > = values in this interval not displayed.

## 2022-10-17 LAB
ANION GAP SERPL CALCULATED.3IONS-SCNC: 10 MMOL/L (ref 7–15)
ANION GAP SERPL CALCULATED.3IONS-SCNC: 9 MMOL/L (ref 7–15)
ATRIAL RATE - MUSE: 67 BPM
ATRIAL RATE - MUSE: 80 BPM
BASOPHILS # BLD AUTO: 0 10E3/UL (ref 0–0.2)
BASOPHILS NFR BLD AUTO: 1 %
BUN SERPL-MCNC: 16.7 MG/DL (ref 8–23)
BUN SERPL-MCNC: 19.1 MG/DL (ref 8–23)
CALCIUM SERPL-MCNC: 8.6 MG/DL (ref 8.8–10.2)
CALCIUM SERPL-MCNC: 8.9 MG/DL (ref 8.8–10.2)
CHLORIDE SERPL-SCNC: 108 MMOL/L (ref 98–107)
CHLORIDE SERPL-SCNC: 108 MMOL/L (ref 98–107)
CREAT SERPL-MCNC: 0.76 MG/DL (ref 0.67–1.17)
CREAT SERPL-MCNC: 0.78 MG/DL (ref 0.67–1.17)
CRP SERPL-MCNC: 50.3 MG/L
DEPRECATED HCO3 PLAS-SCNC: 17 MMOL/L (ref 22–29)
DEPRECATED HCO3 PLAS-SCNC: 19 MMOL/L (ref 22–29)
DIASTOLIC BLOOD PRESSURE - MUSE: NORMAL MMHG
DIASTOLIC BLOOD PRESSURE - MUSE: NORMAL MMHG
EOSINOPHIL # BLD AUTO: 0.2 10E3/UL (ref 0–0.7)
EOSINOPHIL NFR BLD AUTO: 4 %
ERYTHROCYTE [DISTWIDTH] IN BLOOD BY AUTOMATED COUNT: 15.6 % (ref 10–15)
GFR SERPL CREATININE-BSD FRML MDRD: >90 ML/MIN/1.73M2
GFR SERPL CREATININE-BSD FRML MDRD: >90 ML/MIN/1.73M2
GLUCOSE SERPL-MCNC: 88 MG/DL (ref 70–99)
GLUCOSE SERPL-MCNC: 91 MG/DL (ref 70–99)
HAPTOGLOB SERPL-MCNC: 196 MG/DL (ref 32–197)
HCT VFR BLD AUTO: 31.5 % (ref 40–53)
HGB BLD-MCNC: 10.1 G/DL (ref 13.3–17.7)
IMM GRANULOCYTES # BLD: 0 10E3/UL
IMM GRANULOCYTES NFR BLD: 0 %
INR PPP: 2.47 (ref 0.85–1.15)
INTERPRETATION ECG - MUSE: NORMAL
INTERPRETATION ECG - MUSE: NORMAL
LYMPHOCYTES # BLD AUTO: 1.2 10E3/UL (ref 0.8–5.3)
LYMPHOCYTES NFR BLD AUTO: 23 %
MAGNESIUM SERPL-MCNC: 1.8 MG/DL (ref 1.7–2.3)
MAGNESIUM SERPL-MCNC: 2.2 MG/DL (ref 1.7–2.3)
MCH RBC QN AUTO: 29 PG (ref 26.5–33)
MCHC RBC AUTO-ENTMCNC: 32.1 G/DL (ref 31.5–36.5)
MCV RBC AUTO: 91 FL (ref 78–100)
MONOCYTES # BLD AUTO: 0.6 10E3/UL (ref 0–1.3)
MONOCYTES NFR BLD AUTO: 12 %
NEUTROPHILS # BLD AUTO: 3.1 10E3/UL (ref 1.6–8.3)
NEUTROPHILS NFR BLD AUTO: 60 %
NRBC # BLD AUTO: 0 10E3/UL
NRBC BLD AUTO-RTO: 0 /100
P AXIS - MUSE: NORMAL DEGREES
P AXIS - MUSE: NORMAL DEGREES
PLATELET # BLD AUTO: 193 10E3/UL (ref 150–450)
POTASSIUM SERPL-SCNC: 4 MMOL/L (ref 3.4–5.3)
POTASSIUM SERPL-SCNC: 4.3 MMOL/L (ref 3.4–5.3)
PR INTERVAL - MUSE: NORMAL MS
PR INTERVAL - MUSE: NORMAL MS
QRS DURATION - MUSE: 310 MS
QRS DURATION - MUSE: 384 MS
QT - MUSE: 524 MS
QT - MUSE: 684 MS
QTC - MUSE: 581 MS
QTC - MUSE: 743 MS
R AXIS - MUSE: 130 DEGREES
R AXIS - MUSE: 61 DEGREES
RBC # BLD AUTO: 3.48 10E6/UL (ref 4.4–5.9)
SODIUM SERPL-SCNC: 134 MMOL/L (ref 136–145)
SODIUM SERPL-SCNC: 137 MMOL/L (ref 136–145)
SYSTOLIC BLOOD PRESSURE - MUSE: NORMAL MMHG
SYSTOLIC BLOOD PRESSURE - MUSE: NORMAL MMHG
T AXIS - MUSE: 140 DEGREES
T AXIS - MUSE: 214 DEGREES
UFH PPP CHRO-ACNC: <0.1 IU/ML
VENTRICULAR RATE- MUSE: 71 BPM
VENTRICULAR RATE- MUSE: 74 BPM
WBC # BLD AUTO: 5.1 10E3/UL (ref 4–11)

## 2022-10-17 PROCEDURE — 250N000013 HC RX MED GY IP 250 OP 250 PS 637: Performed by: STUDENT IN AN ORGANIZED HEALTH CARE EDUCATION/TRAINING PROGRAM

## 2022-10-17 PROCEDURE — 36415 COLL VENOUS BLD VENIPUNCTURE: CPT | Performed by: INTERNAL MEDICINE

## 2022-10-17 PROCEDURE — 82310 ASSAY OF CALCIUM: CPT

## 2022-10-17 PROCEDURE — 250N000011 HC RX IP 250 OP 636

## 2022-10-17 PROCEDURE — 80048 BASIC METABOLIC PNL TOTAL CA: CPT

## 2022-10-17 PROCEDURE — 93750 INTERROGATION VAD IN PERSON: CPT | Performed by: STUDENT IN AN ORGANIZED HEALTH CARE EDUCATION/TRAINING PROGRAM

## 2022-10-17 PROCEDURE — 250N000013 HC RX MED GY IP 250 OP 250 PS 637

## 2022-10-17 PROCEDURE — 85520 HEPARIN ASSAY: CPT | Performed by: INTERNAL MEDICINE

## 2022-10-17 PROCEDURE — 85610 PROTHROMBIN TIME: CPT

## 2022-10-17 PROCEDURE — 250N000013 HC RX MED GY IP 250 OP 250 PS 637: Performed by: INTERNAL MEDICINE

## 2022-10-17 PROCEDURE — 36415 COLL VENOUS BLD VENIPUNCTURE: CPT

## 2022-10-17 PROCEDURE — 86140 C-REACTIVE PROTEIN: CPT

## 2022-10-17 PROCEDURE — 214N000001 HC R&B CCU UMMC

## 2022-10-17 PROCEDURE — 85025 COMPLETE CBC W/AUTO DIFF WBC: CPT

## 2022-10-17 PROCEDURE — 99232 SBSQ HOSP IP/OBS MODERATE 35: CPT | Mod: 25 | Performed by: STUDENT IN AN ORGANIZED HEALTH CARE EDUCATION/TRAINING PROGRAM

## 2022-10-17 PROCEDURE — 83735 ASSAY OF MAGNESIUM: CPT | Performed by: INTERNAL MEDICINE

## 2022-10-17 PROCEDURE — 83735 ASSAY OF MAGNESIUM: CPT

## 2022-10-17 RX ORDER — HYDROXYCHLOROQUINE SULFATE 200 MG/1
200 TABLET, FILM COATED ORAL 2 TIMES DAILY
Qty: 60 TABLET | Refills: 0 | Status: SHIPPED | OUTPATIENT
Start: 2022-10-17 | End: 2022-10-25

## 2022-10-17 RX ORDER — HYDROXYCHLOROQUINE SULFATE 200 MG/1
200 TABLET, FILM COATED ORAL 2 TIMES DAILY
Status: DISCONTINUED | OUTPATIENT
Start: 2022-10-17 | End: 2022-10-18 | Stop reason: HOSPADM

## 2022-10-17 RX ORDER — ASPIRIN 81 MG/1
81 TABLET, CHEWABLE ORAL DAILY
Qty: 30 TABLET | Refills: 0 | Status: ON HOLD | OUTPATIENT
Start: 2022-10-18 | End: 2022-11-01

## 2022-10-17 RX ORDER — LOPERAMIDE HCL 2 MG
2 CAPSULE ORAL 2 TIMES DAILY PRN
Qty: 30 CAPSULE | Refills: 0 | Status: SHIPPED | OUTPATIENT
Start: 2022-10-17

## 2022-10-17 RX ORDER — ZINC SULFATE 50(220)MG
220 CAPSULE ORAL DAILY
Qty: 30 CAPSULE | Refills: 0 | OUTPATIENT
Start: 2022-10-18 | End: 2024-08-08

## 2022-10-17 RX ORDER — LISINOPRIL 10 MG/1
10 TABLET ORAL DAILY
Qty: 30 TABLET | Refills: 0 | Status: SHIPPED | OUTPATIENT
Start: 2022-10-17 | End: 2023-01-12

## 2022-10-17 RX ORDER — TAMSULOSIN HYDROCHLORIDE 0.4 MG/1
0.4 CAPSULE ORAL DAILY
Qty: 30 CAPSULE | Refills: 0 | Status: ON HOLD | OUTPATIENT
Start: 2022-10-18 | End: 2024-06-13

## 2022-10-17 RX ORDER — SERTRALINE HYDROCHLORIDE 25 MG/1
75 TABLET, FILM COATED ORAL DAILY
Qty: 90 TABLET | Refills: 0 | OUTPATIENT
Start: 2022-10-18 | End: 2024-08-08

## 2022-10-17 RX ORDER — HYDROXYCHLOROQUINE SULFATE 200 MG/1
200 TABLET, FILM COATED ORAL 2 TIMES DAILY
Qty: 60 TABLET | Refills: 0 | OUTPATIENT
Start: 2022-10-17 | End: 2024-08-08

## 2022-10-17 RX ORDER — HYDROCORTISONE 10 MG/G
1 CREAM TOPICAL DAILY PRN
Qty: 1 G | Refills: 0 | Status: SHIPPED | OUTPATIENT
Start: 2022-10-17 | End: 2023-08-24

## 2022-10-17 RX ORDER — ATORVASTATIN CALCIUM 40 MG/1
40 TABLET, FILM COATED ORAL DAILY
Qty: 30 TABLET | Refills: 0 | Status: ON HOLD | OUTPATIENT
Start: 2022-10-17 | End: 2024-06-13

## 2022-10-17 RX ORDER — DIGOXIN 125 MCG
125 TABLET ORAL DAILY
Qty: 30 TABLET | Refills: 0 | Status: SHIPPED | OUTPATIENT
Start: 2022-10-17 | End: 2022-11-29

## 2022-10-17 RX ORDER — LOPERAMIDE HCL 2 MG
2 CAPSULE ORAL 2 TIMES DAILY PRN
Qty: 60 CAPSULE | Refills: 0 | OUTPATIENT
Start: 2022-10-17 | End: 2024-08-08

## 2022-10-17 RX ORDER — ATORVASTATIN CALCIUM 40 MG/1
40 TABLET, FILM COATED ORAL DAILY
Qty: 30 TABLET | Refills: 0 | OUTPATIENT
Start: 2022-10-17 | End: 2024-08-08

## 2022-10-17 RX ORDER — HYDROXYCHLOROQUINE SULFATE 200 MG/1
200 TABLET, FILM COATED ORAL DAILY
Qty: 60 TABLET | Refills: 0 | Status: SHIPPED | OUTPATIENT
Start: 2022-10-17 | End: 2022-10-17

## 2022-10-17 RX ORDER — ZINC SULFATE 50(220)MG
220 CAPSULE ORAL DAILY
Qty: 12 CAPSULE | Refills: 0 | Status: SHIPPED | OUTPATIENT
Start: 2022-10-18 | End: 2022-10-27

## 2022-10-17 RX ORDER — WARFARIN SODIUM 7.5 MG/1
7.5 TABLET ORAL
Status: COMPLETED | OUTPATIENT
Start: 2022-10-17 | End: 2022-10-17

## 2022-10-17 RX ORDER — HYDROXYZINE HYDROCHLORIDE 10 MG/1
10 TABLET, FILM COATED ORAL 3 TIMES DAILY PRN
Qty: 21 TABLET | Refills: 0 | OUTPATIENT
Start: 2022-10-17 | End: 2024-08-08

## 2022-10-17 RX ORDER — LISINOPRIL 10 MG/1
10 TABLET ORAL DAILY
Qty: 90 TABLET | Refills: 0 | OUTPATIENT
Start: 2022-10-17 | End: 2024-08-08

## 2022-10-17 RX ORDER — DIGOXIN 125 MCG
125 TABLET ORAL DAILY
Qty: 30 TABLET | Refills: 0 | OUTPATIENT
Start: 2022-10-17 | End: 2024-08-08

## 2022-10-17 RX ORDER — MAGNESIUM SULFATE HEPTAHYDRATE 40 MG/ML
2 INJECTION, SOLUTION INTRAVENOUS ONCE
Status: COMPLETED | OUTPATIENT
Start: 2022-10-17 | End: 2022-10-17

## 2022-10-17 RX ORDER — SERTRALINE HYDROCHLORIDE 25 MG/1
75 TABLET, FILM COATED ORAL DAILY
Qty: 30 TABLET | Refills: 0 | Status: SHIPPED | OUTPATIENT
Start: 2022-10-18 | End: 2022-10-27

## 2022-10-17 RX ADMIN — LISINOPRIL 10 MG: 10 TABLET ORAL at 08:37

## 2022-10-17 RX ADMIN — ACETAMINOPHEN 975 MG: 325 TABLET, FILM COATED ORAL at 21:41

## 2022-10-17 RX ADMIN — ATORVASTATIN CALCIUM 40 MG: 40 TABLET, FILM COATED ORAL at 20:02

## 2022-10-17 RX ADMIN — ACETAMINOPHEN 975 MG: 325 TABLET, FILM COATED ORAL at 08:42

## 2022-10-17 RX ADMIN — SERTRALINE 75 MG: 25 TABLET, FILM COATED ORAL at 08:41

## 2022-10-17 RX ADMIN — SALINE NASAL SPRAY 2 SPRAY: 1.5 SOLUTION NASAL at 21:41

## 2022-10-17 RX ADMIN — DIGOXIN 125 MCG: 125 TABLET ORAL at 08:42

## 2022-10-17 RX ADMIN — Medication 1 TABLET: at 08:39

## 2022-10-17 RX ADMIN — TAMSULOSIN HYDROCHLORIDE 0.4 MG: 0.4 CAPSULE ORAL at 08:41

## 2022-10-17 RX ADMIN — WARFARIN SODIUM 7.5 MG: 7.5 TABLET ORAL at 18:13

## 2022-10-17 RX ADMIN — METHYLCELLULOSE 500 MG: 500 TABLET ORAL at 08:37

## 2022-10-17 RX ADMIN — HYDROXYCHLOROQUINE SULFATE 200 MG: 200 TABLET, FILM COATED ORAL at 20:02

## 2022-10-17 RX ADMIN — ASPIRIN 81 MG CHEWABLE TABLET 81 MG: 81 TABLET CHEWABLE at 08:40

## 2022-10-17 RX ADMIN — Medication 5 MG: at 21:41

## 2022-10-17 RX ADMIN — HYDROXYZINE HYDROCHLORIDE 10 MG: 10 TABLET ORAL at 21:41

## 2022-10-17 RX ADMIN — MAGNESIUM SULFATE IN WATER 2 G: 40 INJECTION, SOLUTION INTRAVENOUS at 09:19

## 2022-10-17 RX ADMIN — THIAMINE HCL TAB 100 MG 100 MG: 100 TAB at 08:42

## 2022-10-17 RX ADMIN — SALINE NASAL SPRAY 2 SPRAY: 1.5 SOLUTION NASAL at 08:47

## 2022-10-17 RX ADMIN — METHYLCELLULOSE 1000 MG: 500 TABLET ORAL at 20:01

## 2022-10-17 RX ADMIN — ZINC SULFATE 220 MG (50 MG) CAPSULE 220 MG: CAPSULE at 08:42

## 2022-10-17 ASSESSMENT — ACTIVITIES OF DAILY LIVING (ADL)
ADLS_ACUITY_SCORE: 31
ADLS_ACUITY_SCORE: 35
ADLS_ACUITY_SCORE: 31

## 2022-10-17 NOTE — PROGRESS NOTES
[facility]  Patient is currently open to home care services with [facility]. The patient is currently receiving RN and PT services.  [facility]  and team have been notified of patient admission.  [facility] liaison will continue to follow patient during stay.  If appropriate provide orders to resume home care at time of discharge.

## 2022-10-17 NOTE — DISCHARGE SUMMARY
[facility]  Discharge Summary - Cardiology       Date of Admission:  10/13/2022  Date of Discharge:  10/18/2022  Discharging Provider: Behzad Zeng  Discharging Attending: Layne Prabhakar MD  Discharge Service: Cardiology 2 Service    Discharge Diagnoses   #COVID19 Infection  #Diarrhea  #C. Diff infection  #Stage D HFrEF 2/2 ICM (EF 10-15%) s/p HM3 LVAD (7/28/22) and CRT-D ('06)  #CAD s/p PCI to LAD ('05)  #Hypomagnesemia  #SLE  #APS  #Hx of DVT/PE  #HTN  #Depression  #MIGUEL  #Severe malnutrition in the context of acute on chronic illness with protein and calorie deficiency  #BPH    Follow-ups Needed After Discharge      - Rheumatology clinic follow-up, 5-7 days following discharge  - Heart Failure Clinic follow-up, scheduled 10/27  - Anticoagulation Clinic follow-up, 1-2 days following discharge    Discharge Disposition   Discharged to home with family  Condition at discharge: Stable    Hospital Course     61 year old male with a history of SLE, antiphospholipid syndrome, pulmonary embolism (on warfarin), ICM (EF 10-15%) s/p HM3 LVAD (7/28/22), and C. Diff infection who was admitted 10/13/2022 with 1 day of fever, body aches, nausea, vomiting, sore throat, and diarrhea. Patient tested positive for COVID and improved with remdesivir. Of note, patient had persistent diarrhea with hypomagnesemia while inpatient, likely secondary to SLE/APS immunosuppression. Diarrhea improved following discontinuation of Myfortic. Plan for discharge home with family with close outpatient follow-up.     #COVID19 Infection  COVID vaccinated (Moderna 3/13/2021, 4/10/2021, 8/24/2021), now s/p remdesivir x3 doses (10/14-10/16). Stable on room air prior to discharge.   - Follow-up with PCP for Moderna Booster    #Diarrhea  #C. Diff infection  Was recently admitted 9/18-9/29/2022 for diarrhea. ID was consulted at that time and did not think it was infectious diarrhea. GI was consulted and  recommended stool bulking agents and he was started on methylcellulose. Cdiff negative as of 10/14. Suspect diarrhea secondary to immunosuppression, which was discontinued while inpatient with improvement in bowel output.   - continue pta methylcellulose 1000mg bid  - continue prn loperamide on discharge    #Stage D HFrEF 2/2 ICM (EF 10-15%) s/p HM3 LVAD (7/28/22) and CRT-D ('06)  #CAD s/p PCI to LAD ('05)  Patient initially hypervolemic on exam without respiratory distress. Hemodynamically stable throughout admission. Of note, patient did have one low flow alert that improved with 500mL IVF. No other LVAD alarms.   - LVAD: LVAD speed 5100 (reduced from 5200 9/19/2022)  - Volume status: Euvolemic  - Dry weight: 134  - Weight on admission: 146 lbs  - Weight prior to discharge: 134  - GDMT              > BB: discontinued during a previous admission, not restarted on discharge               > ACEi/ARB/ARNi: continue pta lisinopril 10 mg daily              > MRA: None              > SGLT2i: None   - SCD ppx: s/p CRT-D (2006)  - Continue Warfarin, Anticoagulation Clinic follow-up  - Continue pta digoxin 125 mcg daily  - Continue pta atorvastatin 40 mg daily  - Continue daily standing weights    #Hypomagnesemia  - Replete prior to discharge with IV magnesium  - Will need outpatient follow-up with PCP to check labs    #SLE  #APS  Patient discussed with inpatient Rheumatology who recommended Myfortic acid initiation after asymptomatic from COVID-19 with gradual increasing dosage for him. This should be restarted with outpatient Rheumatology assistance.   - Start Myfortic acid when asymptomatic from COVID -19    - 180 mg once daily x1 week    - 180mg BID x1 week    - 360 mg in the morning and 180 mg in the evening x1 week    - 360 mg BID ongoing  - Follow-up with outpatient rheumatology to discuss re-initiation of Myfortic  - Continue pta hydroxychloroquine 200 mg BiD    #Hx of DVT/PE  - Continue PTA Warfarin (INR Goal  2-3)  - Anticoag clinic follow-up, referral ordered    #HTN  - Continue pta Lisinopril 10mg daily  - HOLD pta hydralazine 25mg TID    #Depression  #MIGUEL  - Continue PTA sertraline    #Severe malnutrition in the context of acute on chronic illness  - Continue pta thiamine     #BPH  - Continue pta tamsulosin    Consultations This Hospital Stay   NURSING TO CONSULT FOR VASCULAR ACCESS CARE IP CONSULT  PHARMACY TO DOSE WARFARIN  CARE MANAGEMENT / SOCIAL WORK IP CONSULT  NUTRITION SERVICES ADULT IP CONSULT  PHARMACY IP CONSULT  PHARMACY IP CONSULT  SMOKING CESSATION PROGRAM IP CONSULT    Code Status   Full Code       The patient was discussed with Dr. Lillie Zeng  CARDS2 Resident  [facility] [address] [address]  Phone: [phone]  ______________________________________________________________________    Physical Exam   Vital Signs: Temp: 97.7  F (36.5  C) Temp src: Oral BP: 93/69 Pulse: 79   Resp: 16 SpO2: 99 % O2 Device: None (Room air)    Weight: 133 lbs 9.6 oz     Constitutional: cooperative, no apparent distress  HENT: anicteric sclera, conjugate gaze   Respiratory: non-labored respirations on room air, CTAB, no crackles or wheezing, no cough  Cardiovascular: RRR, no murmur, trace edema   GI: soft, non-distended, non-tender  Skin: warm and dry, no rashes or lesions  Neurologic: Cranial nerves II-XII are grossly intact, moving all extremities equally and independently    Primary Care Physician   Scar Jeffrey    Discharge Orders   No discharge procedures on file.    Significant Results and Procedures   Results for orders placed or performed during the hospital encounter of 10/13/22   XR Chest Port 1 View    Narrative    EXAM: XR CHEST PORT 1 VIEW  LOCATION: [facility]  DATE/TIME: 10/13/2022 10:16 PM    INDICATION: Fever, chills, nausea vomiting  COMPARISON: 09/18/2022      Impression    IMPRESSION: No acute  process. No evidence for CHF or pneumonia. No pleural effusion or pneumothorax. Stable mild elevation of the right hemidiaphragm. Stable left sided biventricular pacer/defibrillator and left ventricular assist device. Mild   cardiomegaly. Prior sternotomy.       Discharge Medications   Current Discharge Medication List        CONTINUE these medications which have NOT CHANGED    Details   acetaminophen (TYLENOL) 325 MG tablet Take 3 tablets (975 mg) by mouth every 8 hours as needed for mild pain  Qty: 270 tablet, Refills: 0    Associated Diagnoses: LVAD (left ventricular assist device) present (H)      atorvastatin (LIPITOR) 40 MG tablet Take 1 tablet (40 mg) by mouth every evening for 30 days  Qty: 30 tablet, Refills: 0    Associated Diagnoses: Decompensated heart failure (H)      digoxin (LANOXIN) 125 MCG tablet Take 1 tablet (125 mcg) by mouth daily for 30 days  Qty: 30 tablet, Refills: 0    Associated Diagnoses: Heart failure with reduced ejection fraction, NYHA class III (H)      hydrALAZINE (APRESOLINE) 25 MG tablet Take 1 tablet (25 mg) by mouth 3 times daily  Qty: 90 tablet, Refills: 0    Associated Diagnoses: Heart failure with reduced ejection fraction, NYHA class III (H)      hydrocortisone 1 % CREA cream Place rectally 3 times daily  Qty: 28 g, Refills: 0    Associated Diagnoses: External hemorrhoids; LVAD (left ventricular assist device) present (H); Heart failure with reduced ejection fraction, NYHA class III (H)      hydroxychloroquine (PLAQUENIL) 200 MG tablet Take 1 tablet (200 mg) by mouth 2 times daily  Qty: 60 tablet, Refills: 0    Associated Diagnoses: Systemic lupus erythematosus (H)      lisinopril (ZESTRIL) 10 MG tablet Take 1 tablet (10 mg) by mouth daily for 30 days  Qty: 30 tablet, Refills: 0    Associated Diagnoses: LVAD (left ventricular assist device) present (H)      multivitamin w/minerals (THERA-VIT-M) tablet Take 1 tablet by mouth daily  Refills: 0    Associated Diagnoses: Heart  failure with reduced ejection fraction, NYHA class III (H)      MYFORTIC (BRAND) 180 MG EC tablet Take 1 tablet (180 mg) by mouth 2 times daily  Qty: 149 tablet, Refills: 0    Comments: 180mg bid to complete 1 week (through 10/2), then 360mg bid to complete 1 week (through 10/9), then 540mg bid indefinitely (follow-up with primary Rheumatologist for refills)  Associated Diagnoses: Systemic lupus erythematosus, unspecified SLE type, unspecified organ involvement status (H)      pramox-pe-glycerin-petrolatum (PREPARATION H) 1-0.25-14.4-15 % CREA cream Place rectally 2 times daily as needed for hemorrhoids Apply immediately after a bowel movement.  Qty: 51 g, Refills: 0    Associated Diagnoses: Heart failure with reduced ejection fraction, NYHA class III (H); LVAD (left ventricular assist device) present (H); External hemorrhoids      promethazine (PHENERGAN) 12.5 MG tablet Take 1 tablet (12.5 mg) by mouth every 6 hours as needed for nausea or vomiting  Qty: 30 tablet, Refills: 1    Associated Diagnoses: Anxiety      saline nasal (AYR SALINE) GEL topical gel Apply into each nare At Bedtime      sodium chloride (OCEAN) 0.65 % nasal spray Spray 2 sprays into both nostrils every 4 hours (while awake)      tamsulosin (FLOMAX) 0.4 MG capsule Take 0.4 mg by mouth daily      thiamine (B-1) 100 MG tablet Take 1 tablet (100 mg) by mouth daily  Qty: 30 tablet, Refills: 0    Associated Diagnoses: Acute on chronic systolic heart failure (H)      warfarin ANTICOAGULANT (COUMADIN) 2.5 MG tablet Take 2 tabs (5mg) daily at bedtime. Future dose adjustments pending INR  Qty: 72 tablet, Refills: 0    Associated Diagnoses: LVAD (left ventricular assist device) present (H)           Allergies   No Known Allergies    I have personally seen and examined the patient on October 18, 2022. I have discussed with the team and agree with the assessment and plan as documented in this note. I have personally reviewed vital signs, medications,  laboratory results, available imaging and hemodynamic data.     Patient with intermittent diarrhea, initially improved now recurring again. Patient with slightly decreased LVAD flows in setting of decreased PO intake, but overall stable. Patient remains off cellcept and will have close Rheumatology follow up. Patient will also follow up with LVAD team once improved from acute COVID infection. Patient understands and agrees with the plan.    I personally interrogated the LVAD at the bedside that showed slightly lower flows with increased PIs.    Heartmate   Flow (Lpm): (!) 2.9 Lpm  Pulse Index (PI): (!) 7.7 PI  Speed (rpm): 5100 rpm  Power (persaud): 3.4 persaud  Current Hct settin     Layne Prabhakar MD   of Medicine, [facility]   Advanced Heart Failure and Transplant Cardiology

## 2022-10-17 NOTE — PLAN OF CARE
Pt is A/O x 4.   Independent.   VSS, RA. Denies pain.   Tele A flutter with BBB. Experienced a 16 beat V tach. Provider notified & BMP ordered.   Lung sounds diminished in bases. Denies SOB.   Heparin discontinued.   LVAD WDL. Dressing changed and drive line site CDI.   BS active x4. Loose stools have slowed down per pt report. Adequate urine output via urinal  Regular diet, tolerating well.  Plans to finish medication for treatment of COVID symptoms and discharge back to prior living arrangement.  Patient currently resting in bed with call light in reach.      Continue to monitor. Contact CARDS 2 with concerns.

## 2022-10-17 NOTE — PROGRESS NOTES
[facility]    Cardiology Progress Note- Cardiology        Date of Admission:  10/13/2022     Assessment & Plan: HVSL   Dandy EDUARD Sands is a 61 year old male with a history of SLE, antiphospholipid syndrome, pulmonary embolism (on warfarin), ICM (EF 10-15%) s/p HM3 LVAD (7/28/22), and C. Diff infection who was admitted 10/14/2022 with 1 day of fever, body aches, nausea, vomiting, sore throat, and diarrhea, tested positive for COVID.       Changes made today:  - Imodium BiD PRN given  - Discontinued Heparin given INR is therapeutic  - Started Hydroxychloroquine 200 mg BiD 10/17    #COVID-19 infection  #History of COVID-19 infection in Jan 2022  Developed symptoms night of 10/12-10/13/2022 (fever, body aches, nausea, vomiting, sore throat, cough), tested positive in the ED 10/13/2022. He is on room air and afebrile on presentation. Had mild leukocytosis to 11.6 on admission. CXR without acute changes. Will treat symptomatically while inpatient, monitor for progression. Discussed with pharmacy and he qualifies for remdesivir, and patient is agreeable to this. Of note, he reports that he is vaccinated for COVID (Moderna 3/13/2021, 4/10/2021, 8/24/2021), and he had COVID Jan 2022.   - Remdesivir IV given while inpatient x3 doses (10/14-10/16)  - Acetaminophen 975mg q8h prn  - AC with pta warfarin as below  - CRP has been down trending  - Holding pta Myfortic in the setting of infection for now    #Diarrhea  #History of C. Diff. Infection  Was recently admitted 9/18-9/29/2022 for diarrhea. ID was consulted at that time and did not think it was infectious diarrhea. GI was consulted and recommended stool bulking agents and he was started on methylcellulose. It does not sound like his diarrhea has worsened with current COVID infection. States that he has been having recurrent diarrhea since morning. Will get testing done for C.diff given his recent hospital stay and  recurrent diarrhea.   - continue pta methylcellulose 1000mg bid  - can consider testing for C. Diff. If diarrhea worsens.     #Stage D HFrEF 2/2 ICM (EF 10-15%) s/p HM3 LVAD (7/28/22) and CRT-D ('06)  #CAD s/p PCI to LAD ('05)  Hypervolemic on exam, no respiratory distress. Will defer diuresis at this time given low po intake and diarrhea and continue to monitor. States that he has been getting weakness and gain in his weight since Tuesday. The LVAD reading have been normal and there are no acute alarms on the LVAD. His BNP is 5061. Hemodynamically stable currently and will monitor accordingly.   - LVAD: LVAD speed 5100 (reduced from 5200 9/19/2022)  - Volume status: Hypervolemic  - Dry weight: was 134 lbs when he was discharged from hospital last 9/29/2022  - Weight on admission: 146 lbs  - GDMT              > BB: discontinued during a previous admission              > ACEi/ARB/ARNi: continue pta lisinopril 10 mg daily              > MRA: None               > SGLT2i: None   - SCD ppx: s/p CRT-D (2006)  - AC:    - Pharmacy to dose warfarin , Heparin has been discontinued as the INR is in therapeutic range.   - INR goal 2-3  - continue pta digoxin 125 mcg daily  - continue pta atorvastatin 40 mg daily  - Telemetry  - Strict I&Os  - Daily standing weights       #SLE  #APS  #Hx of DVT/PE  On warfarin PTA (INR goal 2-3).   - Pharmacy to dose warfarin  - Started pta hydroxychloroquine 200mg bid - holding pta Myfortic in the setting of infection for now     #HTN  - discontinued pta hydralazine 25mg TID given concern for hypotension  - continue pta lisinopril 10 mg daily     #Depression  #MIGUEL  - continue pta sertraline 75mg daily     #Malnutrition  - continue pta thiamine 100mg daily     #BPH  - continue pta tamsulosin 0.4mg daily        Diet: Regular diet     DVT Prophylaxis: Warfarin  Fitzgerald Catheter: Not present  Code Status: Full         Disposition Plan   Expected discharge: Tomorrow, recommended to prior living  arrangement once transport is available. .    The patient's care was discussed with the Attending Physician, Dr. Layne Prabhakar.    Lynn Merlos MD  [facility]    ______________________________________________________________________    Interval History   No acute events over the night. I reviewed the nursing notes and he has bigeminies on the EKG since yesterday but has been asymptomatic during that time. Denies any chest pain or shortness of breath. Does not complain of any nausea or vomiting. His diarrhea has been better and had 4 BM yesterday. Feels more energetic and wake. Has been walking around the room and feels better.     Data reviewed today: I reviewed all medications, new labs and imaging results over the last 24 hours.      Physical Exam   Vital Signs: Temp: 97.8  F (36.6  C) Temp src: Oral BP: (!) 83/68 Pulse: 74   Resp: 14 SpO2: 98 % O2 Device: None (Room air)    Weight: 134 lbs 11.2 oz  General Appearance: siting in bed and seems comfortable  Respiratory: b/l chest is clear and there are no crackles or rales or wheezing  Cardiovascular: LVAD hum is present, JVD is not appreciable, extremities are warm and there is no pitting edema  GI: soft, non tender and there is no distention,no rebound tenderness  Skin: generalized bruising, surgical scars  Other:  Alert, answers all questions and mood is appropriate    Data   Recent Labs   Lab 10/17/22  0534 10/16/22  1641 10/16/22  0640 10/15/22  1733 10/14/22  2206 10/14/22  1742 10/14/22  1043 10/13/22  2050   WBC 5.1  --  6.0 6.6   < > 10.0   < > 11.6*   HGB 10.1*  --  9.5* 9.9*   < > 9.0*   < > 11.5*   MCV 91  --  93 91   < > 91   < > 94     --  161 158   < > 151   < > 203   INR 2.47* 2.32* 2.35*  --    < >  --    < > 1.93*     --  134* 134*   < >  --    < > 136  136   POTASSIUM 4.0  --  4.1 3.5   < >  --    < > 4.0  4.0   CHLORIDE 108*  --  108* 105   < >  --    < > 102  102   CO2  19*  --  15* 18*   < >  --    < > 19*  19*   BUN 19.1  --  18.3 16.1   < >  --    < > 10.1  10.1   CR 0.76  --  0.71 0.69   < >  --    < > 0.72  0.72   ANIONGAP 10  --  11 11   < >  --    < > 15  15   ELDA 8.9  --  8.0* 8.5*   < >  --    < > 9.1  9.1   GLC 88  --  89 104*   < >  --    < > 93  93   ALBUMIN  --   --   --   --   --  3.5  --  4.2   PROTTOTAL  --   --   --   --   --  6.4  --  7.7   BILITOTAL  --   --   --   --   --  0.5  --  0.7   ALKPHOS  --   --   --   --   --  82  --  100   ALT  --   --   --   --   --  16  --  19   AST  --   --   --   --   --  21  --  26   LIPASE  --   --   --   --   --   --   --  69*    < > = values in this interval not displayed.

## 2022-10-17 NOTE — PLAN OF CARE
I/A: A/Ox4. VSS on RA. Aflutter BBB w/ PVCs on tele. LVAD #'s WNL except for occasional low flows, daily dressing with regular tape. Tylenol x1 for back/shoulder pain. Fair appetite, denied nausea. Adequate UOP. LBM 10/16, loose. Up independently to BSC. Atarax and melatonin given for sleep promotion.      Drips: Heparin gtt at 1050 units/hr via PIV (INR therapeutic)     P: Likely discharge back to [facility] when medically ready. Encourage oral intake, pulm hygiene and ambulation.     Hours of care: [phone]

## 2022-10-18 VITALS
HEIGHT: 67 IN | HEART RATE: 76 BPM | BODY MASS INDEX: 21.05 KG/M2 | DIASTOLIC BLOOD PRESSURE: 80 MMHG | WEIGHT: 134.1 LBS | SYSTOLIC BLOOD PRESSURE: 100 MMHG | OXYGEN SATURATION: 99 % | RESPIRATION RATE: 16 BRPM | TEMPERATURE: 97.9 F

## 2022-10-18 LAB
ANION GAP SERPL CALCULATED.3IONS-SCNC: 12 MMOL/L (ref 7–15)
BASOPHILS # BLD AUTO: 0 10E3/UL (ref 0–0.2)
BASOPHILS NFR BLD AUTO: 1 %
BUN SERPL-MCNC: 16.3 MG/DL (ref 8–23)
CALCIUM SERPL-MCNC: 9.1 MG/DL (ref 8.8–10.2)
CHLORIDE SERPL-SCNC: 108 MMOL/L (ref 98–107)
CREAT SERPL-MCNC: 0.69 MG/DL (ref 0.67–1.17)
CRP SERPL-MCNC: 29.7 MG/L
DEPRECATED HCO3 PLAS-SCNC: 17 MMOL/L (ref 22–29)
EOSINOPHIL # BLD AUTO: 0.1 10E3/UL (ref 0–0.7)
EOSINOPHIL NFR BLD AUTO: 3 %
ERYTHROCYTE [DISTWIDTH] IN BLOOD BY AUTOMATED COUNT: 15.4 % (ref 10–15)
GFR SERPL CREATININE-BSD FRML MDRD: >90 ML/MIN/1.73M2
GLUCOSE SERPL-MCNC: 87 MG/DL (ref 70–99)
HCT VFR BLD AUTO: 30.1 % (ref 40–53)
HGB BLD-MCNC: 9.5 G/DL (ref 13.3–17.7)
IMM GRANULOCYTES # BLD: 0 10E3/UL
IMM GRANULOCYTES NFR BLD: 0 %
INR PPP: 2.54 (ref 0.85–1.15)
LYMPHOCYTES # BLD AUTO: 1.1 10E3/UL (ref 0.8–5.3)
LYMPHOCYTES NFR BLD AUTO: 22 %
MAGNESIUM SERPL-MCNC: 1.8 MG/DL (ref 1.7–2.3)
MCH RBC QN AUTO: 29.1 PG (ref 26.5–33)
MCHC RBC AUTO-ENTMCNC: 31.6 G/DL (ref 31.5–36.5)
MCV RBC AUTO: 92 FL (ref 78–100)
MONOCYTES # BLD AUTO: 0.6 10E3/UL (ref 0–1.3)
MONOCYTES NFR BLD AUTO: 11 %
NEUTROPHILS # BLD AUTO: 3.1 10E3/UL (ref 1.6–8.3)
NEUTROPHILS NFR BLD AUTO: 63 %
NRBC # BLD AUTO: 0 10E3/UL
NRBC BLD AUTO-RTO: 0 /100
PLATELET # BLD AUTO: 205 10E3/UL (ref 150–450)
POTASSIUM SERPL-SCNC: 3.9 MMOL/L (ref 3.4–5.3)
RBC # BLD AUTO: 3.26 10E6/UL (ref 4.4–5.9)
SODIUM SERPL-SCNC: 137 MMOL/L (ref 136–145)
WBC # BLD AUTO: 4.9 10E3/UL (ref 4–11)

## 2022-10-18 PROCEDURE — 80048 BASIC METABOLIC PNL TOTAL CA: CPT

## 2022-10-18 PROCEDURE — 85610 PROTHROMBIN TIME: CPT

## 2022-10-18 PROCEDURE — 250N000013 HC RX MED GY IP 250 OP 250 PS 637

## 2022-10-18 PROCEDURE — 258N000003 HC RX IP 258 OP 636

## 2022-10-18 PROCEDURE — 36415 COLL VENOUS BLD VENIPUNCTURE: CPT

## 2022-10-18 PROCEDURE — 83735 ASSAY OF MAGNESIUM: CPT

## 2022-10-18 PROCEDURE — 93750 INTERROGATION VAD IN PERSON: CPT | Performed by: STUDENT IN AN ORGANIZED HEALTH CARE EDUCATION/TRAINING PROGRAM

## 2022-10-18 PROCEDURE — 86140 C-REACTIVE PROTEIN: CPT

## 2022-10-18 PROCEDURE — 99238 HOSP IP/OBS DSCHRG MGMT 30/<: CPT | Mod: 25 | Performed by: STUDENT IN AN ORGANIZED HEALTH CARE EDUCATION/TRAINING PROGRAM

## 2022-10-18 PROCEDURE — 85025 COMPLETE CBC W/AUTO DIFF WBC: CPT

## 2022-10-18 RX ORDER — WARFARIN SODIUM 7.5 MG/1
7.5 TABLET ORAL
Status: DISCONTINUED | OUTPATIENT
Start: 2022-10-18 | End: 2022-10-18 | Stop reason: HOSPADM

## 2022-10-18 RX ADMIN — DIGOXIN 125 MCG: 125 TABLET ORAL at 08:15

## 2022-10-18 RX ADMIN — TAMSULOSIN HYDROCHLORIDE 0.4 MG: 0.4 CAPSULE ORAL at 08:15

## 2022-10-18 RX ADMIN — SERTRALINE 75 MG: 25 TABLET, FILM COATED ORAL at 08:15

## 2022-10-18 RX ADMIN — THIAMINE HCL TAB 100 MG 100 MG: 100 TAB at 08:15

## 2022-10-18 RX ADMIN — ZINC SULFATE 220 MG (50 MG) CAPSULE 220 MG: CAPSULE at 08:15

## 2022-10-18 RX ADMIN — HYDROXYCHLOROQUINE SULFATE 200 MG: 200 TABLET, FILM COATED ORAL at 08:15

## 2022-10-18 RX ADMIN — Medication 1 TABLET: at 08:15

## 2022-10-18 RX ADMIN — LOPERAMIDE HYDROCHLORIDE 2 MG: 2 CAPSULE ORAL at 11:04

## 2022-10-18 RX ADMIN — SODIUM CHLORIDE 500 ML: 9 INJECTION, SOLUTION INTRAVENOUS at 05:15

## 2022-10-18 RX ADMIN — ASPIRIN 81 MG CHEWABLE TABLET 81 MG: 81 TABLET CHEWABLE at 08:15

## 2022-10-18 RX ADMIN — METHYLCELLULOSE 1000 MG: 500 TABLET ORAL at 08:15

## 2022-10-18 RX ADMIN — LISINOPRIL 10 MG: 10 TABLET ORAL at 08:15

## 2022-10-18 ASSESSMENT — ACTIVITIES OF DAILY LIVING (ADL)
ADLS_ACUITY_SCORE: 31

## 2022-10-18 NOTE — PHARMACY-ANTICOAGULATION SERVICE
Clinical Pharmacy- Warfarin Discharge Note  This patient is currently on warfarin for the treatment of LVAD.  INR Goal= 2-3  Expected length of therapy lifetime.    Warfarin PTA Regimen: 7.5 mg daily    Anticoagulation Dose History     Recent Dosing and Labs Latest Ref Rng & Units 10/14/2022 10/15/2022 10/16/2022 10/16/2022 10/16/2022 10/17/2022 10/18/2022    Warfarin 5 mg - - - - - 5 mg - -    Warfarin 7.5 mg - 7.5 mg 7.5 mg - - - 7.5 mg -    [id number] 70 - 130 % - - - - - - -    INR 0.85 - 1.15 1.67(H) 1.64(H) 2.35(H) 2.32(H) - 2.47(H) 2.54(H)      Vitamin K doses administered during the last 7 days: none    A/P:   1. Recommend discharging the patient on his prior to admission warfarin dose of 7.5 mg daily. Next INR check in the next week.     Natalie Mcpherson, PharmD, BCCP, BCPS

## 2022-10-18 NOTE — PLAN OF CARE
DISCHARGE                         10/18/2022  4:21 PM  ----------------------------------------------------------------------------  Discharged to: [facility]  Via: vehicle  Accompanied by: Son  Discharge Instructions: diet, activity, medications, follow up appointments, when to call the MD, aftercare instructions, and what to watchout for (i.e. s/s of infection, increasing SOB, palpitations, chest pain,)  Prescriptions: To be filled by d/c pharmacy per pt's request; medication list reviewed & sent with pt  Follow Up Appointments: arranged; information given  Belongings: All sent with pt  IV: out  Telemetry: off  Pt exhibits understanding of above discharge instructions; all questions answered.    Discharge Paperwork: Signed, copied, and sent home with patient.

## 2022-10-18 NOTE — PROGRESS NOTES
Care Management Discharge Note    Discharge Date: 10/18/2022     Discharge Disposition: Return to [address]    Discharge Services: [facility] (RN), [facility]    Discharge DME: None    Discharge Transportation: family or friend will provide    Education Provided on the Discharge Plan: Yes  Persons Notified of Discharge Plans: Pt  Patient/Family in Agreement with the Plan: Yes      Additional Information:  Per MD, pt medically ready for discharge today and will need INR drawn in next couple days. This writer updated [facility] liason and placed orders.    [facility]  Phone: [phone]    For resumption of care.  RN evaluation post hospitalization. Assess vital signs, respiratory and cardiac status, activity tolerance, hydration, nutritional status, med setup and management.   INR lab draws with results to [facility], next due on 10/20/22.     CC will continue to monitor patient's medical condition and progress towards discharge.  Taylor Schmitz RN BSN  [address] Unit Care Coordinator  Phone number: [phone]  Pager: [phone]

## 2022-10-18 NOTE — PROVIDER NOTIFICATION
Cards crosscover MARIBETH CARTAGENA notified at this time  Flow down to 2.6L and PI up to 9.6. Map 92.  Pt denied CP and dizziness.     Pt stated numbers are fine. Fluids encouraged.

## 2022-10-18 NOTE — PLAN OF CARE
Dx: admitted 10/14 for fever, body aches, sore throat, diarrhea and found to be covid+    Neuro: A&O x4. Atarax and melatonin given at HS  Cardiac: SR 1 AVB and RBBB. Frequent sustaining PVC bigeminy noted. 1x  at 0419.  Doppler MAP elevated. Flow down to 2.4L and PI up to 10. HM3 LVAD with no alarm. Pt reported #'s jump back and forth. 500mL NS bolus given  Respiratory: LS clear on RA  GI/: Pt reported diarrhea improvement. Voiding.  Diet: Reg  Skin: No new skin deficit noted.    Pain: generalized pain relieved with tylenol.   LDAs: L PIV  Electrolytes: Await AM lab result   Mobility: Up ad dianna in room.    Plan: Pt anticipates discharge this AM back to home with [facility].    Goal Outcome Evaluation:      Plan of Care Reviewed With: patient    Overall Patient Progress: improvingOverall Patient Progress: improving    Outcome Evaluation: Reported improvement in diarrhea and other symptoms.

## 2022-10-19 ENCOUNTER — CARE COORDINATION (OUTPATIENT)
Dept: CARDIOLOGY | Facility: CLINIC | Age: 62
End: 2022-10-19

## 2022-10-19 ENCOUNTER — TELEPHONE (OUTPATIENT)
Dept: ANTICOAGULATION | Facility: CLINIC | Age: 62
End: 2022-10-19

## 2022-10-19 ENCOUNTER — PATIENT OUTREACH (OUTPATIENT)
Dept: CARE COORDINATION | Facility: CLINIC | Age: 62
End: 2022-10-19

## 2022-10-19 DIAGNOSIS — Z95.811 LVAD (LEFT VENTRICULAR ASSIST DEVICE) PRESENT (H): ICD-10-CM

## 2022-10-19 DIAGNOSIS — I50.20 HEART FAILURE WITH REDUCED EJECTION FRACTION, NYHA CLASS III (H): ICD-10-CM

## 2022-10-19 DIAGNOSIS — Z95.811 LEFT VENTRICULAR ASSIST DEVICE PRESENT (H): ICD-10-CM

## 2022-10-19 DIAGNOSIS — I50.22 CHRONIC SYSTOLIC CONGESTIVE HEART FAILURE (H): Primary | ICD-10-CM

## 2022-10-19 NOTE — PROGRESS NOTES
Called pt to check in 24hr after discharge from the hospital for covid admission. Pt did not answer. Left voicemail with request to call back for questions or concerns.   Pt is also 3 weeks post discharge from implant hospitalization. Appt for 10/21 cancelled due to quarantine for COVID. Pt has appt with Dr. Lora on 10/27.     Update: Pt called back later in the afternoon. He is feeling ok. Still fairly tired from COVID but improving. He is mostly sleeping and resting. VAD parameters stable, 1 low flow alarm yesterday am. Weight is stable at 135lb. Reviewed appts for next week. Pt requested to reschedule 10/27 afternoon appts as he has a dental appt that has also been rescheduled several times and he needs some dental work completed. Able to reschedule cardiology appt to am, but will have to reschedule CVTS appt to another day.

## 2022-10-19 NOTE — PROGRESS NOTES
Clinic Care Coordination Contact  [facility]: Post-Discharge Note  SITUATION                                                      Admission:    Admission Date: 10/13/22   Reason for Admission: #COVID19 Infection  #Diarrhea  #C. Diff infection  #Stage D HFrEF 2/2 ICM (EF 10-15%) s/p HM3 LVAD (7/28/22) and CRT-D ('06)  #CAD s/p PCI to LAD ('05)  #Hypomagnesemia  #SLE  #APS  #Hx of DVT/PE  #HTN  #Depression  #MIGUEL  #Severe malnutrition in the context of acute on chronic illness with protein and calorie deficiency  #BPH  Discharge:   Discharge Date: 10/18/22  Discharge Diagnosis: #COVID19 Infection  #Diarrhea  #C. Diff infection  #Stage D HFrEF 2/2 ICM (EF 10-15%) s/p HM3 LVAD (7/28/22) and CRT-D ('06)  #CAD s/p PCI to LAD ('05)  #Hypomagnesemia  #SLE  #APS  #Hx of DVT/PE  #HTN  #Depression  #MIGUEL  #Severe malnutrition in the context of acute on chronic illness with protein and calorie deficiency  #BPH    BACKGROUND                                                      61 year old male with a history of SLE, antiphospholipid syndrome, pulmonary embolism (on warfarin), ICM (EF 10-15%) s/p HM3 LVAD (7/28/22), and C. Diff infection who was admitted 10/13/2022 with 1 day of fever, body aches, nausea, vomiting, sore throat, and diarrhea. Patient tested positive for COVID and improved with remdesivir. Of note, patient had persistent diarrhea with hypomagnesemia while inpatient, likely secondary to SLE/APS immunosuppression. Diarrhea improved following discontinuation of Myfortic. Plan for discharge home with family with close outpatient follow-up.      #COVID19 Infection  COVID vaccinated (Moderna 3/13/2021, 4/10/2021, 8/24/2021), now s/p remdesivir x3 doses (10/14-10/16). Stable on room air prior to discharge.   - Follow-up with PCP for Moderna Booster     #Diarrhea  #C. Diff infection  Was recently admitted 9/18-9/29/2022 for diarrhea. ID was consulted at that time and did not think it was infectious diarrhea. GI was  consulted and recommended stool bulking agents and he was started on methylcellulose. Cdiff negative as of 10/14. Suspect diarrhea secondary to immunosuppression, which was discontinued while inpatient with improvement in bowel output.   - continue pta methylcellulose 1000mg bid  - continue prn loperamide on discharge     #Stage D HFrEF 2/2 ICM (EF 10-15%) s/p HM3 LVAD (7/28/22) and CRT-D ('06)  #CAD s/p PCI to LAD ('05)  Patient initially hypervolemic on exam without respiratory distress. Hemodynamically stable throughout admission. Of note, patient did have one low flow alert that improved with 500mL IVF. No other LVAD alarms.   - LVAD: LVAD speed 5100 (reduced from 5200 9/19/2022)  - Volume status: Euvolemic  - Dry weight: 134  - Weight on admission: 146 lbs  - Weight prior to discharge: 134  - GDMT              > BB: discontinued during a previous admission, not restarted on discharge               > ACEi/ARB/ARNi: continue pta lisinopril 10 mg daily              > MRA: None              > SGLT2i: None   - SCD ppx: s/p CRT-D (2006)  - Continue Warfarin, Anticoagulation Clinic follow-up  - Continue pta digoxin 125 mcg daily  - Continue pta atorvastatin 40 mg daily  - Continue daily standing weights     #Hypomagnesemia  - Replete prior to discharge with IV magnesium  - Will need outpatient follow-up with PCP to check labs     #SLE  #APS  Patient discussed with inpatient Rheumatology who recommended Myfortic acid initiation after asymptomatic from COVID-19 with gradual increasing dosage for him. This should be restarted with outpatient Rheumatology assistance.   - Start Myfortic acid when asymptomatic from COVID -19    - 180 mg once daily x1 week    - 180mg BID x1 week    - 360 mg in the morning and 180 mg in the evening x1 week    - 360 mg BID ongoing  - Follow-up with outpatient rheumatology to discuss re-initiation of Myfortic  - Continue pta hydroxychloroquine 200 mg BiD     #Hx of DVT/PE  - Continue PTA  Warfarin (INR Goal 2-3)  - Anticoag clinic follow-up, referral ordered     #HTN  - Continue pta Lisinopril 10mg daily  - HOLD pta hydralazine 25mg TID     #Depression  #MIGUEL  - Continue PTA sertraline     #Severe malnutrition in the context of acute on chronic illness  - Continue pta thiamine     ASSESSMENT           Discharge Assessment  How are you doing now that you are home?: Patient is doing better, but has had some vomiting yet. Just took a zofran to help with this.  How are your symptoms? (Red Flag symptoms escalate to triage hotline per guidelines): Improved  Do you feel your condition is stable enough to be safe at home until your provider visit?: Yes  Does the patient have their discharge instructions? : Yes  Does the patient have questions regarding their discharge instructions? : No  Were you started on any new medications or were there changes to any of your previous medications? : Yes  Does the patient have all of their medications?: Yes  Do you have questions regarding any of your medications? : No  Do you have all of your needed medical supplies or equipment (DME)?  (i.e. oxygen tank, CPAP, cane, etc.): Yes  Discharge follow-up appointment scheduled within 14 calendar days? : Yes  Discharge Follow Up Appointment Date: 10/20/22  Discharge Follow Up Appointment Scheduled with?: Specialty Care Provider (Nurse visit)         Post-op (Clinicians Only)  Did the patient have surgery or a procedure: No  Fever: No  Chills: No      PLAN                                                      Outpatient Plan:   Please attend the following appointments after discharge:  - Please follow-up with Rheumatology within 2-3 weeks after  discharge  - Please follow-up with Heart Failure Clinic within 2-3 weeks  after discharge    Future Appointments   Date Time Provider Department Center   10/21/2022  8:30 AM Antonio Mae MD [facility]   10/27/2022 11:30 AM [facility] [place]   10/27/2022 12:00 PM Kelly Lora  MD [facility]   10/27/2022  1:00 PM Darius Zamora MD [facility]   1/12/2023 12:00 PM UC LAB [facility]CLAB[facility]   1/12/2023 12:30 PM UC PFL 6 MINUTE WALK 1 UCPFT [facility]/12[facility]2023  1:00 [facility]   1/12/2023  1:30 PM [facility]1 [facility]   1/12/2023  2:30 PM Kelly Lora MD [facility]         For any urgent concerns, please contact our 24 hour nurse triage line: [phone] ([phone])         PETER Crandall

## 2022-10-19 NOTE — TELEPHONE ENCOUNTER
ANTICOAGULATION  MANAGEMENT: Discharge Review    Dandy Sands chart reviewed for anticoagulation continuity of care    Hospital Admission on 10/14-10/18 for Covid.    Discharge disposition: Home with Home Care    Results:    Recent labs: (last 7 days)     10/13/22  2050 10/14/22  1043 10/15/22  0724 10/15/22  2323 10/16/22  0640 10/16/22  1413 10/16/22  1641 10/17/22  0534 10/18/22  0606   INR 1.93* 1.67* 1.64*  --  2.35*  --  2.32* 2.47* 2.54*   AAUFH  --   --   --  <0.10 0.36 0.31  --  <0.10  --      Anticoagulation inpatient management:     See calender    Anticoagulation discharge instructions:     Warfarin dosing: Discharge note says 5mg daily   Bridging: No   INR goal change: No      Medication changes affecting anticoagulation: Yes: START taking:  aspirin (ASA)  loperamide (IMODIUM)  melatonin  methylcellulose (CITRUCEL)  sertraline (ZOLOFT)  zinc sulfate (ZINCATE)    Additional factors affecting anticoagulation: Yes: Patient is recovering from Covid     PLAN     No adjustment to anticoagulation plan needed    Patient not contacted. Home care is instructed to see patient on 10/20/22    Anticoagulation Calendar updated    Holli Silva RN

## 2022-10-20 ENCOUNTER — ANTICOAGULATION THERAPY VISIT (OUTPATIENT)
Dept: ANTICOAGULATION | Facility: CLINIC | Age: 62
End: 2022-10-20

## 2022-10-20 DIAGNOSIS — Z95.811 LVAD (LEFT VENTRICULAR ASSIST DEVICE) PRESENT (H): ICD-10-CM

## 2022-10-20 DIAGNOSIS — I50.22 CHRONIC SYSTOLIC CONGESTIVE HEART FAILURE (H): Primary | ICD-10-CM

## 2022-10-20 DIAGNOSIS — I50.20 HEART FAILURE WITH REDUCED EJECTION FRACTION, NYHA CLASS III (H): ICD-10-CM

## 2022-10-20 DIAGNOSIS — Z95.811 LEFT VENTRICULAR ASSIST DEVICE PRESENT (H): ICD-10-CM

## 2022-10-20 LAB — INR (EXTERNAL): 3.8 (ref 0.9–1.1)

## 2022-10-20 NOTE — PROGRESS NOTES
ANTICOAGULATION MANAGEMENT     Dandy Sands 62 year old male is on warfarin with supratherapeutic INR result. (Goal INR 2.0-3.0)    Recent labs: (last 7 days)     10/20/22  1239   INR 3.8*       ASSESSMENT       Source(s): Chart Review, Patient/Caregiver Call and Home Care/Facility Nurse       Warfarin doses taken: More warfarin taken than planned which may be affecting INR-instructed to take 5 mg daily at hospital discharge-took 7.5 mg last evening instead of recommended 5 mg.     Diet: No new diet changes identified  Myfortic dc'd-diarrhea improved.     New illness, injury, or hospitalization: Yes: Hospital Admission on 10/14-10/18 for Covid    Medication/supplement changes: yes-start taking at discharge from hospital:           aspirin (ASA)     loperamide (IMODIUM)     melatonin     methylcellulose (CITRUCEL)     sertraline (ZOLOFT)   zinc sulfate (ZINCATE)      Signs or symptoms of bleeding or clotting: Yes: reports a nosebleed this AM, INR supratherapeutic today    Previous INR: Therapeutic last 2(+) visits    Additional findings: LVAD CC paged with supratherapeutic INR results, nosebleed, and to discuss medication recommendation compliance.   Spoke to VAD CC's Alia and Vijay was not aware of ASA being restarted at hospital discharge, she will connect with Dr. Lora to continue or stop Aspirin. Return message received from Dr. Lora to discontinue Aspirin. Patient's son verbalized understanding to STOP Aspirin.      PLAN     Recommended plan for temporary change(s) and ongoing change(s) affecting INR     Dosing Instructions: partial hold then decrease your warfarin dose (23.8% change) with next INR in 4 days       Summary  As of 10/20/2022    Full warfarin instructions:  10/20: 2.5 mg; Otherwise 7.5 mg every Sun, Wed; 5 mg all other days; Starting 10/20/2022   Next INR check:  10/24/2022             Telephone call with  sonAmos who verbalizes understanding and agrees to plan and who agrees to  plan and repeated back plan correctly  Detailed voice message left for Sil home care nurse with dosing instructions and follow up date.     Orders given to  Homecare nurse/facility to recheck    Education provided:     Taking warfarin: Importance of taking warfarin as instructed    Goal range and lab monitoring: goal range and significance of current result and Importance of therapeutic range    Symptom monitoring: monitoring for bleeding signs and symptoms    Contact [phone] with any changes, questions or concerns.     Education on post COVID temporary changes in Warfarin dosing, importance of taking Warfarin as instructed    Plan made with [facility] Pharmacist Pepper Sneed and per LVAD protocol, VAD CC/MD recommendations for Aspirin    LORENA MOSES RN  Anticoagulation Clinic  10/20/2022    _______________________________________________________________________     Anticoagulation Episode Summary     Current INR goal:  2.0-3.0   TTR:  92.4 % (2.1 wk)   Target end date:  Indefinite   Send INR reminders to:  ANTICOAG LVAD    Indications    Chronic systolic congestive heart failure (H) [I50.22]  LVAD (left ventricular assist device) present (H) [Z95.811]  Heart failure with reduced ejection fraction  NYHA class III (H) [I50.20]  Left ventricular assist device present (H) [Z95.811]           Comments:  Follow VAD Anticoag protocol:Yes: HeartMate 3   Bridging: No bridging epistaxis.   Date VAD placed: 7/8/22         Anticoagulation Care Providers     Provider Role Specialty Phone number    Kelly Lora MD Referring Cardiovascular Disease [phone]    Behzad Zeng MD Referring Student in organized health care education/training program [phone]

## 2022-10-21 ENCOUNTER — PRE VISIT (OUTPATIENT)
Dept: OTOLARYNGOLOGY | Facility: CLINIC | Age: 62
End: 2022-10-21

## 2022-10-24 ENCOUNTER — ANTICOAGULATION THERAPY VISIT (OUTPATIENT)
Dept: ANTICOAGULATION | Facility: CLINIC | Age: 62
End: 2022-10-24

## 2022-10-24 DIAGNOSIS — I50.20 HEART FAILURE WITH REDUCED EJECTION FRACTION, NYHA CLASS III (H): ICD-10-CM

## 2022-10-24 DIAGNOSIS — Z79.899 LONG TERM USE OF DRUG: ICD-10-CM

## 2022-10-24 DIAGNOSIS — Z95.811 LEFT VENTRICULAR ASSIST DEVICE PRESENT (H): ICD-10-CM

## 2022-10-24 DIAGNOSIS — Z95.811 LVAD (LEFT VENTRICULAR ASSIST DEVICE) PRESENT (H): ICD-10-CM

## 2022-10-24 DIAGNOSIS — I50.22 CHRONIC SYSTOLIC CONGESTIVE HEART FAILURE (H): ICD-10-CM

## 2022-10-24 DIAGNOSIS — I50.22 CHRONIC SYSTOLIC CONGESTIVE HEART FAILURE (H): Primary | ICD-10-CM

## 2022-10-24 LAB — INR (EXTERNAL): 2.4 (ref 0.9–1.1)

## 2022-10-24 RX ORDER — WARFARIN SODIUM 2.5 MG/1
TABLET ORAL
Qty: 90 TABLET | Refills: 5 | Status: ON HOLD | OUTPATIENT
Start: 2022-10-24 | End: 2022-11-03

## 2022-10-24 NOTE — PROGRESS NOTES
Rheumatology Clinic Visit  [facility]  James Sterling M.D.     aDndy Sands MRN# [id number]   YOB: 1960 Age: 62 year old   Date of Visit: 10/25/2022  Primary care provider: Scar Jeffrey          Assessment and Plan:     #COVID-19 infection, hospitalized until October 18: Patient relates no active pulmonary symptoms or symptoms formerly associated with active COVID-19.  Oxygenation is excellent.  Productive sputum is reduced.  Patient is recovering apace from infection, and no additional diagnostic or therapeutic steps are required.    # SLE:  There is chronic neck and mid back pain, but no peripheral joint pain, skin rashes, oral ulcers, or other symptoms formerly associated with active lupus.  Physical exam shows clear lungs and tenderness over the midline between the shoulder blades.  No synovitis. Blood work on October 18, 2022 showed normal creatinine and electrolytes; CRP was elevated at 30, down from 70 on October 16.  CBC showed hemoglobin of 9.5, down from 11.3 noted on October 7.  Complement C3 and C4, antidouble-stranded DNA titers were normal/normal in the past 1 months.  Urinalysis was clear on September 19, 2022.    Despite only minimal exposure to mycophenolate acid or mycophenolate in the last 6 weeks, I find no evidence of active autoimmunity or systemic inflammatory disease at present.  Elevated inflammatory markers in mid October were likely associated with COVID-19 infection.  Symptoms associated with the latter are now markedly improved.  I do not think that systemic lupus is active, and since mycophenolate acid and mycophenolate have been associated with possible debilitating gastrointestinal symptoms in the past several months, I recommend continuing to withhold these medications for now.  Lupus activity should be monitored carefully with symptoms and by laboratory evaluation over the coming months.  Hydroxychloroquine should be continued.    We discussed the  following plan:  Plan:  1.  Hold mycophenolate and hold mycophenolate acid  2.  Continue hydroxychloroquine 200 mg twice daily.  While taking hydroxychloroquine, undergo annual ophthalmology evaluation to monitor for rare hydroxychloroquine retinal toxicity.  3.  Blood work for lupus activity in approximately 1 month, either at home, or through [employer] systems.    RTC 5-6 mos     James Sterling MD  [facility]           History of Present Illness:   Dandy Sands presents for evaluation of MMF tolerability. Mr. Sands is a 61-year-old male with history of SLE (+ LASHANDA, + dsDNA, + Le, + RNP, arthritis, photosensitivity, fatigue), with PE and DVT in the setting of antiphospholipid syndrome, ICM s/p LVAD (7/8/2022) and recent admission on 9/18/2022 for diarrhea.    Interval history October 25, 2022    Patient is seen in the company of his adult son who endorses historical report.    Patient was seen in late September 2022 in the hospital by rheumatology for diarrhea likely caused by use of mycophenolate Moffa Roselyn.  Recommendation was to discontinue mycophenolate, and to start mycophenolate acid 180 mg twice daily.    The patient was diagnosed with lupus 15 to 20 years ago.  He was previously on hydroxychloroquine and azathioprine.  Azathioprine was switched to MMF in the summer 2020 when patient was having worsening arthritis symptoms.  He reports he has previously had pain in his hands, back, neck.  He is now having pain only in his back, neck, and shoulders that has been worsening over the past couple years.  The pain is worse in the morning, associated with 10 to 15 minutes of stiffness.  He denies red, warm, or swollen joints.    The patient was about to see a new rheumatologist at [facility] prior to his admission back during/early summer 2022.  He had previously seen Dr. Stanley Garcia at [facility].  The patient notes he plans to return to therapy for another month before going home  to [place].     Patient was hospitalized at [facility] from October 13 through October 18, 2022 with a diagnosis of COVID-19 infection, heart failure with an ejection fraction of 10 to 15%.  Diarrhea was present at the time of admission, suspected due to immunosuppressive medication.  Mycophenolic acid was stopped, and patient was treated with methylcellulose and loperamide.    Today, he is well.  He reports that he has not had diarrhea for over 2 weeks, in association with starting Citrucel and loperamide daily.  He has only used mycophenolate acid for 1 week since he met rheumatology in the hospital in September, due to intercurrent hospitalizations, infections, and recurrent diarrhea (due to C. difficile infection in late September).  He has not had peripheral joint pain, although he notes chronic midline pain between the shoulder blades and in the neck, associated with numbness in the paraspinal tissues.  He denies oral ulcers, photophobia, red irritated eyes, headaches, swollen glands, skin rashes, or neuropathic symptoms.  Headaches, myalgias, fevers, and productive cough that were associated with COVID-19 infection have all receded dramatically.    He expresses opinion that diarrhea was in retrospect, likely not due to mycophenolate acid or mycophenolate, but to viral infection and to C. difficile.  He wonders if mycophenolate acid derivatives are still required, whether he can make a new trial of mycophenolate.           Review of Systems:     Constitutional: negative  Skin: negative  Eyes: negative  Ears/Nose/Throat: negative  Respiratory: No shortness of breath, dyspnea on exertion, cough, or hemoptysis  Cardiovascular: negative  Gastrointestinal: negative  Genitourinary: negative  Musculoskeletal: negative  Neurologic: negative  Psychiatric: negative  Hematologic/Lymphatic/Immunologic: negative  Endocrine: negative         Active Problem List:     Patient Active Problem List     Diagnosis Date Noted     Chills 10/14/2022     Priority: Medium     COVID 10/14/2022     Priority: Medium     Nausea and vomiting, unspecified vomiting type 10/14/2022     Priority: Medium     Left ventricular assist device present (H) 10/07/2022     Priority: Medium     C. difficile diarrhea 09/18/2022     Priority: Medium     Complication involving left ventricular assist device (LVAD) 09/06/2022     Priority: Medium     Acute diarrhea 09/06/2022     Priority: Medium     Lightheadedness 09/05/2022     Priority: Medium     Recurrent epistaxis 09/05/2022     Priority: Medium     Epistaxis 09/05/2022     Priority: Medium     Heart failure with reduced ejection fraction, NYHA class III (H) 08/21/2022     Priority: Medium     Chronic systolic congestive heart failure (H) 08/19/2022     Priority: Medium     LVAD (left ventricular assist device) present (H) 07/11/2022     Priority: Medium     Cardiogenic shock (H) 05/28/2022     Priority: Medium     Decompensated heart failure (H) 05/16/2022     Priority: Medium            Past Medical History:     Past Medical History:   Diagnosis Date     Antiphospholipid antibody syndrome (H)      CAD (coronary artery disease)      Chronic systolic heart failure (H)      Ischemic cardiomyopathy      Mitral regurgitation      Systemic lupus erythematosus (H)      Past Surgical History:   Procedure Laterality Date     COLONOSCOPY N/A 05/23/2022    Procedure: COLONOSCOPY;  Surgeon: Parth Meneses MD;  Location: [facility] OR     CV CORONARY ANGIOGRAM N/A 05/24/2022    Procedure: Coronary Angiogram;  Surgeon: Mike Pope MD;  Location: [facility] CARDIAC CATH LAB     CV CORONARY ANGIOGRAM N/A 05/29/2022    Procedure: Coronary Angiogram;  Surgeon: Austin Birght MD;  Location: [facility] CARDIAC CATH LAB     CV CORONARY LITHOTRIPSY PCI Bilateral 05/24/2022    Procedure: Percutaneous Coronary Intervention - Lithotripsy;  Surgeon: Mike Pope MD;  Location: [facility] CARDIAC  CATH LAB     CV INTRA AORTIC BALLOON N/A 06/17/2022    Procedure: Intraprocedure Aortic Balloon Pump Insertion;  Surgeon: Sherman Cerda MD;  [facility]     CV INTRA AORTIC BALLOON Right 07/03/2022    Procedure: Reposition of Subclavian Intraprocedure Aortic Balloon Pump;  Surgeon: Austin Bright MD;  Location:  [facility] CARDIAC CATH LAB     CV INTRAVASULAR ULTRASOUND N/A 05/24/2022    Procedure: Intravascular Ultrasound;  Surgeon: Mike Pope MD;  [facility]     CV PCI ANGIOPLASTY N/A 05/29/2022    Procedure: Percutaneous Transluminal Angioplasty;  Surgeon: Austin Bright MD;  [facility]     CV PCI STENT DRUG ELUTING N/A 05/24/2022    Procedure: Percutaneous Coronary Intervention Stent;  Surgeon: Mike Pope MD;  [facility]     CV RIGHT HEART CATH MEASUREMENTS RECORDED N/A 05/18/2022    Procedure: Right Heart Catheterization;  Surgeon: Justo Stewart MD;  [facility]     CV RIGHT HEART CATH MEASUREMENTS RECORDED N/A 05/24/2022    Procedure: Right Heart Catheterization;  Surgeon: Mike Pope MD;  [facility]     CV RIGHT HEART CATH MEASUREMENTS RECORDED N/A 05/29/2022    Procedure: Right Heart Catheterization;  Surgeon: Austin Bright MD;  [facility]     CV RIGHT HEART CATH MEASUREMENTS RECORDED N/A 07/01/2022    Procedure: Right Heart Catheterization;  Surgeon: Mike Pope MD;  [facility]     CV RIGHT HEART CATH MEASUREMENTS RECORDED N/A 9/23/2022    Procedure: Right Heart Catheterization;  Surgeon: Justo Stewart MD;  [facility]     CV RIGHT HEART EXERCISE STRESS STUDY N/A 05/18/2022    Procedure: Stress Drug Study;  Surgeon: Justo Stewart MD;  [facility]     CV SWAN CHOCO PROCEDURE N/A 06/17/2022    Procedure: [place]  Procedure;  Surgeon: Sherman Cerda MD;  [facility]     INCISION AND DRAINAGE CHEST WASHOUT, COMBINED N/A 07/11/2022    Procedure: Chest washout and closure;  Surgeon: Darnell Morgan MD;  [facility]     INCISION AND DRAINAGE CHEST WASHOUT, COMBINED N/A 07/12/2022    Procedure: INCISION AND DRAINAGE, WOUND, CHEST, WITH IRRIGATION;  Surgeon: Darius Zamora MD;  Location: [facility] OR     INSERT ARTERIAL LINE  07/18/2022          INSERT INTRAAORTIC BALLOON PUMP Right 06/23/2022    Procedure: INSERTION, INTRA-AORTIC BALLOON PUMP RIGHT SUBCLAVIAN ARTERY.;  Surgeon: Hayden Veras MD;  [facility]     INSERT VENTRICULAR ASSIST DEVICE LEFT (HEARTMATE II/III) MINIMALLY INVASIVE N/A 07/08/2022    Procedure: MEDIAN STERNOTOMY.  CARDIOPULMONARY BYPASS.  INTRAOPERATIVE TRANSESOPHAGEAL ECHOCARDIOGRAM.  IMPLANT LEFT VENTRICULAR ASSIST DEVICE HEARTMATE III.  PLACEMENT OF PERCUTANEOUS RIGHT VENTRICULAR ASSIST DEVICE.  TEMPORARY CHEST CLOSURE;  Surgeon: Darius Zamora MD;  Location[facility]     IR CHEST TUBE PLACEMENT NON-TUNNELED RIGHT  08/01/2022     IRRIGATION AND DEBRIDEMENT CHEST WASHOUT, COMBINED N/A 07/13/2022    Procedure: IRRIGATION AND DEBRIDEMENT, CHEST;  Surgeon: Darius Zamora MD;  Location: [facility]     MIDLINE DOUBLE LUMEN PLACEMENT Left 09/11/2022    Mid line ok to use     MIDLINE DOUBLE LUMEN PLACEMENT Right 09/14/2022    5FR DL midline.     PICC DOUBLE LUMEN PLACEMENT Right 05/28/2022    right basilic 5 fr dl picc 40 cm     PICC DOUBLE LUMEN PLACEMENT Right 08/15/2022    Right Lateral, 41 total length, 3 cm external length            Social History:     Social History     Socioeconomic History     Marital status: Single     Spouse name: Not on file     Number of children: Not on file     Years of education: Not on file     Highest education level: Not on file   Occupational History     Not on file   Tobacco Use     Smoking status: Former     Smokeless  tobacco: Never   Substance and Sexual Activity     Alcohol use: Not on file     Drug use: Not on file     Sexual activity: Not on file   Other Topics Concern     Not on file   Social History Narrative     Not on file     Social Determinants of Health     Financial Resource Strain: Not on file   Food Insecurity: Not on file   Transportation Needs: Not on file   Physical Activity: Not on file   Stress: Not on file   Social Connections: Not on file   Intimate Partner Violence: Not on file   Housing Stability: Not on file          Family History:   No family history on file.  Non contributory       Allergies:   No Known Allergies         Medications:     Current Outpatient Medications   Medication Sig Dispense Refill     acetaminophen (TYLENOL) 325 MG tablet Take 3 tablets (975 mg) by mouth every 8 hours as needed for mild pain 270 tablet 0     atorvastatin (LIPITOR) 40 MG tablet Take 1 tablet (40 mg) by mouth daily 30 tablet 0     digoxin (LANOXIN) 125 MCG tablet Take 1 tablet (125 mcg) by mouth daily 30 tablet 0     hydrALAZINE (APRESOLINE) 25 MG tablet Take 1 tablet (25 mg) by mouth 3 times daily 90 tablet 0     hydrocortisone 1 % CREA cream Place 1 g rectally daily as needed for itching 1 g 0     hydroxychloroquine (PLAQUENIL) 200 MG tablet Take 1 tablet (200 mg) by mouth 2 times daily 60 tablet 4     lisinopril (ZESTRIL) 10 MG tablet Take 1 tablet (10 mg) by mouth daily 30 tablet 0     loperamide (IMODIUM) 2 MG capsule Take 1 capsule (2 mg) by mouth 2 times daily as needed for diarrhea 30 capsule 0     melatonin 5 MG tablet Take 1 tablet (5 mg) by mouth At Bedtime 30 tablet 0     methylcellulose (CITRUCEL) 500 MG TABS tablet Take 2 tablets (1,000 mg) by mouth 2 times daily 60 tablet 0     multivitamin w/minerals (THERA-VIT-M) tablet Take 1 tablet by mouth daily  0     pramox-pe-glycerin-petrolatum (PREPARATION H) 1-0.25-14.4-15 % CREA cream Place rectally 2 times daily as needed for hemorrhoids Apply immediately  after a bowel movement. 51 g 0     promethazine (PHENERGAN) 12.5 MG tablet Take 1 tablet (12.5 mg) by mouth every 6 hours as needed for nausea or vomiting 30 tablet 1     saline nasal (AYR SALINE) GEL topical gel Apply 1 applicator into each nare nightly as needed for congestion       sodium chloride (OCEAN) 0.65 % nasal spray Spray 2 sprays into both nostrils every 4 hours as needed for congestion       tamsulosin (FLOMAX) 0.4 MG capsule Take 1 capsule (0.4 mg) by mouth daily 30 capsule 0     thiamine (B-1) 100 MG tablet Take 1 tablet (100 mg) by mouth daily 30 tablet 0     warfarin ANTICOAGULANT (COUMADIN) 2.5 MG tablet Take 2 tablets (5 mg) to 3 tablets (7.5 mg) by mouth daily or as directed by Anticoagulation Clinic. 90 tablet 5     warfarin ANTICOAGULANT (COUMADIN) 2.5 MG tablet Take 2 tabs (5mg) daily at bedtime. Future dose adjustments pending INR (Patient taking differently: Take 7.5 mg by mouth every evening) 72 tablet 0     zinc sulfate (ZINCATE) 220 (50 Zn) MG capsule Take 1 capsule (220 mg) by mouth daily for 12 doses 12 capsule 0     aspirin (ASA) 81 MG chewable tablet Take 1 tablet (81 mg) by mouth daily (Patient not taking: Reported on 10/25/2022) 30 tablet 0     sertraline (ZOLOFT) 25 MG tablet Take 3 tablets (75 mg) by mouth daily (Patient not taking: Reported on 10/25/2022) 30 tablet 0            Physical Exam:   Pulse 56, weight 65.8 kg (145 lb), SpO2 97 %.  Wt Readings from Last 6 Encounters:   10/25/22 65.8 kg (145 lb)   10/18/22 60.8 kg (134 lb 1.6 oz)   10/07/22 63.6 kg (140 lb 3.2 oz)   09/29/22 60.8 kg (134 lb 0.6 oz)   09/15/22 61.2 kg (134 lb 14.7 oz)   09/04/22 59.1 kg (130 lb 6.4 oz)     Constitutional: thin appearing stated age; cooperative  Eyes: nl EOM, PERRLA, vision, conjunctiva, sclera  ENT: nl external ears, nose, hearing, lips, teeth, gums, throat  No mucous membrane lesions, normal saliva pool  Neck: no mass or thyroid enlargement  Resp: lungs clear to auscultation, nl to  palpation  CV: Continuous sinusoidal grating sound heard over left sternal border  Lymph: no cervical, supraclavicular, inguinal or epitrochlear nodes  MS: The TMJ, neck, shoulder, elbow, wrist, MCP/PIP/DIP, spine, hip, knee, ankle, and foot MTP/IP joints were examined and found normal. No active synovitis or altered joint anatomy.  Tenderness at the mid thoracic spine in the midline.  Cervical range of motion was normal, as was lumbar spine range of motion.  Skin: no nail pitting, alopecia, rash, nodules or lesions  Neuro: nl cranial nerves, strength, sensation, DTRs.   Psych: nl judgement, orientation, memory, affect.         Data:     @  RHEUM RESULTS Latest Ref Rng & Units 5/11/2022 5/16/2022 5/16/2022   ALBUMIN 3.4 - 5.0 g/dL - 3.5 3.5   ALT 10 - 50 U/L - 56 56   AST 10 - 50 U/L - 44 44   LASHANDA INTERPRETATION Negative - - -   ANAP1 - - - -   ANAT1 - - - -   COMPLEMENT C3 81 - 157 mg/dL - - -   COMPLEMENT C4 13 - 39 mg/dL - - -   CK TOTAL 30 - 300 U/L - - -   CREATININE 0.67 - 1.17 mg/dL - 0.96 -   CREATININE (EXTERNAL) 0.7 - 1.3 mg/dL 1.0 - -   CRP <5.00 mg/L - - -   DNA <10.0 IU/mL - - -   GFR ESTIMATE >60 mL/min/1.73m2 - 90 -   HEMATOCRIT 40.0 - 53.0 % - 28.4(L) -   HEMOGLOBIN 13.3 - 17.7 g/dL - 8.8(L) -   HEPBANG Nonreactive - - -   HCVAB Nonreactive - - -    - 1,616 mg/dL - - -   WBC 4.0 - 11.0 10e3/uL - 12.6(H) -   RBC 4.40 - 5.90 10e6/uL - 3.08(L) -   RDW 10.0 - 15.0 % - 18.2(H) -   MCHC 31.5 - 36.5 g/dL - 31.0(L) -   MCV 78 - 100 fL - 92 -   PLATELET COUNT 150 - 450 10e3/uL - 344 -   RHEUMATOID FACTOR <12 IU/mL - - -   ESR 0 - 20 mm/hr - - -   QUANTIFERON-TB GOLD PLUS RESULT Negative - - -       Rheumatoid Factor   Date Value Ref Range Status   06/19/2022 <6 <12 IU/mL Final   ,   Cyclic Citrullinated Peptide Antibody IgG   Date Value Ref Range Status   06/19/2022 1.7 <7.0 U/mL Final     Comment:     Negative   ,  ,  ,   Scl-70 Antibody IgG   Date Value Ref Range Status   06/19/2022 Negative Negative  Final     SSA (Ro) Antibody IgG   Date Value Ref Range Status   06/19/2022 Negative Negative Final     SSB (La) Antibody IgG   Date Value Ref Range Status   06/19/2022 Negative Negative Final   ,  ,   LASHANDA interpretation   Date Value Ref Range Status   06/19/2022 Positive (A) Negative Final     Comment:       Negative:              <1:40  Borderline Positive:   1:40 - 1:80  Positive:              >1:80     LASHANDA pattern 1   Date Value Ref Range Status   06/19/2022 Speckled  Final     LASHANDA titer 1   Date Value Ref Range Status   06/19/2022 1:320  Final   ,  ,   DNA (ds) Antibody   Date Value Ref Range Status   07/19/2022 13.0 (H) <10.0 IU/mL Final     Comment:     Equivocal   ,  ,  ,  ,  ,  ,   Hepatitis B Core Antibody Total   Date Value Ref Range Status   05/20/2022 Nonreactive Nonreactive Final   ,   Hepatitis B Surface Antigen   Date Value Ref Range Status   05/20/2022 Nonreactive Nonreactive Final   ,  ,  ,  ,   Quantiferon-TB Gold Plus   Date Value Ref Range Status   05/20/2022 Negative Negative Final     Comment:     No interferon gamma response to M.tuberculosis antigens was detected. Infection with M.tuberculosis is unlikely, however a single negative result does not exclude infection. In patients at high risk for infection, a second test should be considered in accordance with the 2017 ATS/IDSA/CDC Clinical Pract  ice Guidelines for Diagnosis of Tuberculosis in Adults and Children      TB1 Ag minus Nil Value   Date Value Ref Range Status   05/20/2022 -0.02 IU/mL Final     TB2 Ag minus Nil Value   Date Value Ref Range Status   05/20/2022 0.00 IU/mL Final     Mitogen minus Nil Result   Date Value Ref Range Status   05/20/2022 9.91 IU/mL Final     Nil Result   Date Value Ref Range Status   05/20/2022 0.09 IU/mL Final   ,  ,  ,  ,  ,  ,  ,  ,  ,  ,   Neutrophil Cytoplasmic Antibody   Date Value Ref Range Status   06/19/2022 <1:10 <1:10 Final   ,   Neutrophil Cytoplasmic Antibody Pattern   Date Value Ref Range  Status   06/19/2022   Final    The ANCA IFA is <1:10.  No further testing will be performed.   ,  ,  ,  ,  ,  ,  ,   Immunoglobulin G   Date Value Ref Range Status   06/19/2022 967 610 - 1,616 mg/dL Final   06/19/2022 961 610 - 1,616 mg/dL Final   ,  ,  ,  ,  ,   Scl-70 Antibody IgG   Date Value Ref Range Status   06/19/2022 Negative Negative Final   ,  ,

## 2022-10-24 NOTE — TELEPHONE ENCOUNTER
NOTES Status Details   OFFICE NOTE from referring provider     OFFICE NOTE from other specialist     DISCHARGE SUMMARY from hospital Internal 10.13.2022  Health   DISCHARGE REPORT from the ER     MEDICATION LIST Internal    LABS (Any and all labs)      Internal    Biopsy/pathology (Anything related to diagnoses I.e. fluid aspirations, lip biopsy, muscle biopsy)               Imaging (All imaging related to diagnoses)     Echo Internal 09.18.2022   HRCT     CXR     EMG                    Scleroderma/Dermatomyositis diagnoses     Previous Cardiology notes      Previous Pulmonary notes     Previous Dermatology notes     Previous GI notes     Lupus diagnoses     Previous Nephrology notes     Previous Dermatology notes     Previous Cardiology notes

## 2022-10-24 NOTE — PROGRESS NOTES
ANTICOAGULATION MANAGEMENT     Dandy Sands 62 year old male is on warfarin with therapeutic INR result. (Goal INR 2.0-3.0)    Recent labs: (last 7 days)     10/24/22  0000   INR 2.4*       ASSESSMENT       Source(s): Chart Review and Home Care/Facility Nurse       Warfarin doses taken: Warfarin taken as instructed    Diet: No new diet changes identified    New illness, injury, or hospitalization: No    Medication/supplement changes: None noted    Signs or symptoms of bleeding or clotting: Yes: has had epistaxis everyday except today. It usually last about 15 minutes.  Reviewed with Dandy that if he cannot stop in within 20 minutes with constant pressure, he should be seen.  He verbalizes understanding of this.  He is also going to see ENT.    Previous INR: Supratherapeutic    Additional findings: Please call Priscila HERCULES with INR result and next INR date.  Also call the patient.  Priscila's phone: [phone]                                 RX sent to pharmacy for warfarin 2.5 mg tablets per patient request.     PLAN     Recommended plan for no diet, medication or health factor changes affecting INR     Dosing Instructions: Continue your current warfarin dose with next INR in 3 days       Summary  As of 10/24/2022    Full warfarin instructions:  7.5 mg every Sun, Wed; 5 mg all other days; Starting 10/24/2022   Next INR check:  10/27/2022             Telephone call with Priscila home care nurse who agrees to plan and repeated back plan correctly    Check at provider office visit    Education provided:     Please call back if any changes to your diet, medications or how you've been taking warfarin    Contact [phone] with any changes, questions or concerns.     Plan made per [facility] anticoagulation protocol and per LVAD protocol    Leatha Abbott, RN  Anticoagulation Clinic  10/24/2022    _______________________________________________________________________     Anticoagulation Episode Summary     Current  INR goal:  2.0-3.0   TTR:  83.6 % (2.7 wk)   Target end date:  Indefinite   Send INR reminders to:  ANTICOAG LVAD    Indications    Chronic systolic congestive heart failure (H) [I50.22]  LVAD (left ventricular assist device) present (H) [Z95.811]  Heart failure with reduced ejection fraction  NYHA class III (H) [I50.20]  Left ventricular assist device present (H) [Z95.811]           Comments:  Follow VAD Anticoag protocol:Yes: HeartMate 3   Bridging: No bridging epistaxis.   Date VAD placed: 7/8/22         Anticoagulation Care Providers     Provider Role Specialty Phone number    Kelly Lora MD Referring Cardiovascular Disease [phone]    Behzad Zeng MD Referring Student in organized health care education/training program [phone]

## 2022-10-25 ENCOUNTER — OFFICE VISIT (OUTPATIENT)
Dept: RHEUMATOLOGY | Facility: CLINIC | Age: 62
End: 2022-10-25
Attending: INTERNAL MEDICINE
Payer: COMMERCIAL

## 2022-10-25 ENCOUNTER — PRE VISIT (OUTPATIENT)
Dept: RHEUMATOLOGY | Facility: CLINIC | Age: 62
End: 2022-10-25

## 2022-10-25 VITALS — HEART RATE: 56 BPM | OXYGEN SATURATION: 97 % | BODY MASS INDEX: 22.71 KG/M2 | WEIGHT: 145 LBS

## 2022-10-25 DIAGNOSIS — Z79.899 LONG-TERM USE OF HYDROXYCHLOROQUINE: Primary | ICD-10-CM

## 2022-10-25 DIAGNOSIS — M32.9 SYSTEMIC LUPUS ERYTHEMATOSUS, UNSPECIFIED SLE TYPE, UNSPECIFIED ORGAN INVOLVEMENT STATUS (H): ICD-10-CM

## 2022-10-25 PROCEDURE — 99496 TRANSJ CARE MGMT HIGH F2F 7D: CPT | Performed by: INTERNAL MEDICINE

## 2022-10-25 PROCEDURE — G0463 HOSPITAL OUTPT CLINIC VISIT: HCPCS

## 2022-10-25 RX ORDER — HYDROXYCHLOROQUINE SULFATE 200 MG/1
200 TABLET, FILM COATED ORAL 2 TIMES DAILY
Qty: 60 TABLET | Refills: 4 | Status: ON HOLD | OUTPATIENT
Start: 2022-10-25 | End: 2024-06-13

## 2022-10-25 NOTE — LETTER
10/25/2022       RE: Dandy Sands  [address]  [address] [address]     Dear Colleague,    Thank you for referring your patient, Dandy Sands, to the [facility]OLOG[facility] [place] at [facility]. Please see a copy of my visit note below.    Rheumatology Clinic Visit  [facility]  James Sterling M.D.     Dandy Sands MRN# [id number]   YOB: 1960 Age: 62 year old   Date of Visit: 10/25/2022  Primary care provider: Scar Jeffrey          Assessment and Plan:     #COVID-19 infection, hospitalized until October 18: Patient relates no active pulmonary symptoms or symptoms formerly associated with active COVID-19.  Oxygenation is excellent.  Productive sputum is reduced.  Patient is recovering apace from infection, and no additional diagnostic or therapeutic steps are required.    # SLE:  There is chronic neck and mid back pain, but no peripheral joint pain, skin rashes, oral ulcers, or other symptoms formerly associated with active lupus.  Physical exam shows clear lungs and tenderness over the midline between the shoulder blades.  No synovitis. Blood work on October 18, 2022 showed normal creatinine and electrolytes; CRP was elevated at 30, down from 70 on October 16.  CBC showed hemoglobin of 9.5, down from 11.3 noted on October 7.  Complement C3 and C4, antidouble-stranded DNA titers were normal/normal in the past 1 months.  Urinalysis was clear on September 19, 2022.    Despite only minimal exposure to mycophenolate acid or mycophenolate in the last 6 weeks, I find no evidence of active autoimmunity or systemic inflammatory disease at present.  Elevated inflammatory markers in mid October were likely associated with COVID-19 infection.  Symptoms associated with the latter are now markedly improved.  I do not think that systemic lupus is active, and since mycophenolate acid and mycophenolate have been associated  with possible debilitating gastrointestinal symptoms in the past several months, I recommend continuing to withhold these medications for now.  Lupus activity should be monitored carefully with symptoms and by laboratory evaluation over the coming months.  Hydroxychloroquine should be continued.    We discussed the following plan:  Plan:  1.  Hold mycophenolate and hold mycophenolate acid  2.  Continue hydroxychloroquine 200 mg twice daily.  While taking hydroxychloroquine, undergo annual ophthalmology evaluation to monitor for rare hydroxychloroquine retinal toxicity.  3.  Blood work for lupus activity in approximately 1 month, either at home, or through [employer].    RTC 5-6 mos     James Sterling MD  Staff Rheumatologist, [facility]           History of Present Illness:   Dandy Sands presents for evaluation of MMF tolerability. Mr. Sands is a 61-year-old male with history of SLE (+ LASHANDA, + dsDNA, + Le, + RNP, arthritis, photosensitivity, fatigue), with PE and DVT in the setting of antiphospholipid syndrome, ICM s/p LVAD (7/8/2022) and recent admission on 9/18/2022 for diarrhea.    Interval history October 25, 2022    Patient is seen in the company of his adult son who endorses historical report.    Patient was seen in late September 2022 in the hospital by rheumatology for diarrhea likely caused by use of mycophenolate Moffa Roselyn.  Recommendation was to discontinue mycophenolate, and to start mycophenolate acid 180 mg twice daily.    The patient was diagnosed with lupus 15 to 20 years ago.  He was previously on hydroxychloroquine and azathioprine.  Azathioprine was switched to MMF in the summer 2020 when patient was having worsening arthritis symptoms.  He reports he has previously had pain in his hands, back, neck.  He is now having pain only in his back, neck, and shoulders that has been worsening over the past couple years.  The pain is worse in the morning, associated with 10 to 15 minutes of  stiffness.  He denies red, warm, or swollen joints.    The patient was about to see a new rheumatologist at [facility] prior to his admission back during/early summer 2022.  He had previously seen Dr. Stanley Garcia at [facility].  The patient notes he plans to return to therapy for another month before going home to [place].     Patient was hospitalized at [facility] from October 13 through October 18, 2022 with a diagnosis of COVID-19 infection, heart failure with an ejection fraction of 10 to 15%.  Diarrhea was present at the time of admission, suspected due to immunosuppressive medication.  Mycophenolic acid was stopped, and patient was treated with methylcellulose and loperamide.    Today, he is well.  He reports that he has not had diarrhea for over 2 weeks, in association with starting Citrucel and loperamide daily.  He has only used mycophenolate acid for 1 week since he met rheumatology in the hospital in September, due to intercurrent hospitalizations, infections, and recurrent diarrhea (due to C. difficile infection in late September).  He has not had peripheral joint pain, although he notes chronic midline pain between the shoulder blades and in the neck, associated with numbness in the paraspinal tissues.  He denies oral ulcers, photophobia, red irritated eyes, headaches, swollen glands, skin rashes, or neuropathic symptoms.  Headaches, myalgias, fevers, and productive cough that were associated with COVID-19 infection have all receded dramatically.    He expresses opinion that diarrhea was in retrospect, likely not due to mycophenolate acid or mycophenolate, but to viral infection and to C. difficile.  He wonders if mycophenolate acid derivatives are still required, whether he can make a new trial of mycophenolate.           Review of Systems:     Constitutional: negative  Skin: negative  Eyes: negative  Ears/Nose/Throat: negative  Respiratory: No shortness of breath,  dyspnea on exertion, cough, or hemoptysis  Cardiovascular: negative  Gastrointestinal: negative  Genitourinary: negative  Musculoskeletal: negative  Neurologic: negative  Psychiatric: negative  Hematologic/Lymphatic/Immunologic: negative  Endocrine: negative         Active Problem List:     Patient Active Problem List    Diagnosis Date Noted     Chills 10/14/2022     Priority: Medium     COVID 10/14/2022     Priority: Medium     Nausea and vomiting, unspecified vomiting type 10/14/2022     Priority: Medium     Left ventricular assist device present (H) 10/07/2022     Priority: Medium     C. difficile diarrhea 09/18/2022     Priority: Medium     Complication involving left ventricular assist device (LVAD) 09/06/2022     Priority: Medium     Acute diarrhea 09/06/2022     Priority: Medium     Lightheadedness 09/05/2022     Priority: Medium     Recurrent epistaxis 09/05/2022     Priority: Medium     Epistaxis 09/05/2022     Priority: Medium     Heart failure with reduced ejection fraction, NYHA class III (H) 08/21/2022     Priority: Medium     Chronic systolic congestive heart failure (H) 08/19/2022     Priority: Medium     LVAD (left ventricular assist device) present (H) 07/11/2022     Priority: Medium     Cardiogenic shock (H) 05/28/2022     Priority: Medium     Decompensated heart failure (H) 05/16/2022     Priority: Medium            Past Medical History:     Past Medical History:   Diagnosis Date     Antiphospholipid antibody syndrome (H)      CAD (coronary artery disease)      Chronic systolic heart failure (H)      Ischemic cardiomyopathy      Mitral regurgitation      Systemic lupus erythematosus (H)      Past Surgical History:   Procedure Laterality Date     COLONOSCOPY N/A 05/23/2022    Procedure: COLONOSCOPY;  Surgeon: Parth Meneses MD;  Location: [facility] OR     CV CORONARY ANGIOGRAM N/A 05/24/2022    Procedure: Coronary Angiogram;  Surgeon: Mike Pope MD;  Location:  HEART CARDIAC CATH LAB      CV CORONARY ANGIOGRAM N/A 05/29/2022    Procedure: Coronary Angiogram;  Surgeon: Austin Bright MD;  Location: [facility] C[facility]AC C[facility] LAB     CV CORONARY LITHOTRIPSY PCI Bilateral 05/24/2022    Procedure: Percutaneous Coronary Intervention - Lithotripsy;  Surgeon: Mike Pope MD;  [facility]: [facility] HE[facility] C[facility]AC CATH LAB     CV INTRA AORTIC BALLOON N/A 06/17/2022    Procedure: Intraprocedure Aortic Balloon Pump Insertion;  Surgeon: Sherman Cerda MD;  Location: [facility] HE[facility] CARD[facility]AC CATH LAB     CV INTRA AORTIC BALLOON Right 07/03/2022    Procedure: Reposition of Subclavian Intraprocedure Aortic Balloon Pump;  Surgeon: Austin Bright MD;  [facility]     CV INTRAVASULAR ULTRASOUND N/A 05/24/2022    Procedure: Intravascular Ultrasound;  Surgeon: Mike Pope MD;  [facility]: [facility]AC [facility]     CV PCI ANGIOPLASTY N/A 05/29/2022    Procedure: Percutaneous Transluminal Angioplasty;  Surgeon: Austin Bright MD;  [facility]: [facility]AC [facility]     CV PCI STENT DRUG ELUTING N/A 05/24/2022    Procedure: Percutaneous Coronary Intervention Stent;  Surgeon: Mike Pope MD;  [facility] LAB     CV RIGHT HEART CATH MEASUREMENTS RECORDED N/A 05/18/2022    Procedure: Right Heart Catheterization;  Surgeon: Justo Stewart MD;  [facility]: [facility] LAB     CV RIGHT HEART CATH MEASUREMENTS RECORDED N/A 05/24/2022    Procedure: Right Heart Catheterization;  Surgeon: Mike Pope MD;  [facility]     CV RIGHT HEART CATH MEASUREMENTS RECORDED N/A 05/29/2022    Procedure: Right Heart Catheterization;  Surgeon: Austin Bright MD;  [facility]     CV RIGHT HEART CATH MEASUREMENTS RECORDED N/A 07/01/2022    Procedure: Right Heart Catheterization;  Surgeon: Mike Pope MD;  [facility]AC [facility]     CV RIGHT HEART CATH MEASUREMENTS RECORDED N/A 9/23/2022    Procedure:  Right Heart Catheterization;  Surgeon: Justo Stewart MD;  Location:  HEART CARDIAC CATH LAB     CV RIGHT HEART EXERCISE STRESS STUDY N/A 05/18/2022    Procedure: Stress Drug Study;  Surgeon: Justo Stewart MD;  Location:  HEART CARDIAC CATH LAB     CV SWAN CHOCO PROCEDURE N/A 06/17/2022    Procedure: [place] Choco Procedure;  Surgeon: Sherman Cerda MD;  Location: [facility] CARDIAC CATH LAB     INCISION AND DRAINAGE CHEST WASHOUT, COMBINED N/A 07/11/2022    Procedure: Chest washout and closure;  Surgeon: Darnell Morgan MD;  [facility]     INCISION AND DRAINAGE CHEST WASHOUT, COMBINED N/A 07/12/2022    Procedure: INCISION AND DRAINAGE, WOUND, CHEST, WITH IRRIGATION;  Surgeon: Darius Zamora MD;  Location: [facility] OR     INSERT ARTERIAL LINE  07/18/2022          INSERT INTRAAORTIC BALLOON PUMP Right 06/23/2022    Procedure: INSERTION, INTRA-AORTIC BALLOON PUMP RIGHT SUBCLAVIAN ARTERY.;  Surgeon: Hayden Veras MD;  Location: [facility] OR     INSERT VENTRICULAR ASSIST DEVICE LEFT (HEARTMATE II/III) MINIMALLY INVASIVE N/A 07/08/2022    Procedure: MEDIAN STERNOTOMY.  CARDIOPULMONARY BYPASS.  INTRAOPERATIVE TRANSESOPHAGEAL ECHOCARDIOGRAM.  IMPLANT LEFT VENTRICULAR ASSIST DEVICE HEARTMATE III.  PLACEMENT OF PERCUTANEOUS RIGHT VENTRICULAR ASSIST DEVICE.  TEMPORARY CHEST CLOSURE;  Surgeon: Darius Zamora MD;  Location: [facility] OR     IR CHEST TUBE PLACEMENT NON-TUNNELED RIGHT  08/01/2022     IRRIGATION AND DEBRIDEMENT CHEST WASHOUT, COMBINED N/A 07/13/2022    Procedure: IRRIGATION AND DEBRIDEMENT, CHEST;  Surgeon: Darius Zamora MD;  Location: [facility] OR     MIDLINE DOUBLE LUMEN PLACEMENT Left 09/11/2022    Mid line ok to use     MIDLINE DOUBLE LUMEN PLACEMENT Right 09/14/2022    5FR DL midline.     PICC DOUBLE LUMEN PLACEMENT Right 05/28/2022    right basilic 5 fr dl picc 40 cm     PICC DOUBLE LUMEN PLACEMENT Right 08/15/2022    Right Lateral, 41 total length, 3 cm external length            Social History:      Social History     Socioeconomic History     Marital status: Single     Spouse name: Not on file     Number of children: Not on file     Years of education: Not on file     Highest education level: Not on file   Occupational History     Not on file   Tobacco Use     Smoking status: Former     Smokeless tobacco: Never   Substance and Sexual Activity     Alcohol use: Not on file     Drug use: Not on file     Sexual activity: Not on file   Other Topics Concern     Not on file   Social History Narrative     Not on file     Social Determinants of Health     Financial Resource Strain: Not on file   Food Insecurity: Not on file   Transportation Needs: Not on file   Physical Activity: Not on file   Stress: Not on file   Social Connections: Not on file   Intimate Partner Violence: Not on file   Housing Stability: Not on file          Family History:   No family history on file.  Non contributory       Allergies:   No Known Allergies         Medications:     Current Outpatient Medications   Medication Sig Dispense Refill     acetaminophen (TYLENOL) 325 MG tablet Take 3 tablets (975 mg) by mouth every 8 hours as needed for mild pain 270 tablet 0     atorvastatin (LIPITOR) 40 MG tablet Take 1 tablet (40 mg) by mouth daily 30 tablet 0     digoxin (LANOXIN) 125 MCG tablet Take 1 tablet (125 mcg) by mouth daily 30 tablet 0     hydrALAZINE (APRESOLINE) 25 MG tablet Take 1 tablet (25 mg) by mouth 3 times daily 90 tablet 0     hydrocortisone 1 % CREA cream Place 1 g rectally daily as needed for itching 1 g 0     hydroxychloroquine (PLAQUENIL) 200 MG tablet Take 1 tablet (200 mg) by mouth 2 times daily 60 tablet 4     lisinopril (ZESTRIL) 10 MG tablet Take 1 tablet (10 mg) by mouth daily 30 tablet 0     loperamide (IMODIUM) 2 MG capsule Take 1 capsule (2 mg) by mouth 2 times daily as needed for diarrhea 30 capsule 0     melatonin 5 MG tablet Take 1 tablet (5 mg) by mouth At Bedtime 30 tablet 0     methylcellulose (CITRUCEL) 500 MG  TABS tablet Take 2 tablets (1,000 mg) by mouth 2 times daily 60 tablet 0     multivitamin w/minerals (THERA-VIT-M) tablet Take 1 tablet by mouth daily  0     pramox-pe-glycerin-petrolatum (PREPARATION H) 1-0.25-14.4-15 % CREA cream Place rectally 2 times daily as needed for hemorrhoids Apply immediately after a bowel movement. 51 g 0     promethazine (PHENERGAN) 12.5 MG tablet Take 1 tablet (12.5 mg) by mouth every 6 hours as needed for nausea or vomiting 30 tablet 1     saline nasal (AYR SALINE) GEL topical gel Apply 1 applicator into each nare nightly as needed for congestion       sodium chloride (OCEAN) 0.65 % nasal spray Spray 2 sprays into both nostrils every 4 hours as needed for congestion       tamsulosin (FLOMAX) 0.4 MG capsule Take 1 capsule (0.4 mg) by mouth daily 30 capsule 0     thiamine (B-1) 100 MG tablet Take 1 tablet (100 mg) by mouth daily 30 tablet 0     warfarin ANTICOAGULANT (COUMADIN) 2.5 MG tablet Take 2 tablets (5 mg) to 3 tablets (7.5 mg) by mouth daily or as directed by Anticoagulation Clinic. 90 tablet 5     warfarin ANTICOAGULANT (COUMADIN) 2.5 MG tablet Take 2 tabs (5mg) daily at bedtime. Future dose adjustments pending INR (Patient taking differently: Take 7.5 mg by mouth every evening) 72 tablet 0     zinc sulfate (ZINCATE) 220 (50 Zn) MG capsule Take 1 capsule (220 mg) by mouth daily for 12 doses 12 capsule 0     aspirin (ASA) 81 MG chewable tablet Take 1 tablet (81 mg) by mouth daily (Patient not taking: Reported on 10/25/2022) 30 tablet 0     sertraline (ZOLOFT) 25 MG tablet Take 3 tablets (75 mg) by mouth daily (Patient not taking: Reported on 10/25/2022) 30 tablet 0            Physical Exam:   Pulse 56, weight 65.8 kg (145 lb), SpO2 97 %.  Wt Readings from Last 6 Encounters:   10/25/22 65.8 kg (145 lb)   10/18/22 60.8 kg (134 lb 1.6 oz)   10/07/22 63.6 kg (140 lb 3.2 oz)   09/29/22 60.8 kg (134 lb 0.6 oz)   09/15/22 61.2 kg (134 lb 14.7 oz)   09/04/22 59.1 kg (130 lb 6.4 oz)      Constitutional: thin appearing stated age; cooperative  Eyes: nl EOM, PERRLA, vision, conjunctiva, sclera  ENT: nl external ears, nose, hearing, lips, teeth, gums, throat  No mucous membrane lesions, normal saliva pool  Neck: no mass or thyroid enlargement  Resp: lungs clear to auscultation, nl to palpation  CV: Continuous sinusoidal grating sound heard over left sternal border  Lymph: no cervical, supraclavicular, inguinal or epitrochlear nodes  MS: The TMJ, neck, shoulder, elbow, wrist, MCP/PIP/DIP, spine, hip, knee, ankle, and foot MTP/IP joints were examined and found normal. No active synovitis or altered joint anatomy.  Tenderness at the mid thoracic spine in the midline.  Cervical range of motion was normal, as was lumbar spine range of motion.  Skin: no nail pitting, alopecia, rash, nodules or lesions  Neuro: nl cranial nerves, strength, sensation, DTRs.   Psych: nl judgement, orientation, memory, affect.         Data:     @  RHEUM RESULTS Latest Ref Rng & Units 5/11/2022 5/16/2022 5/16/2022   ALBUMIN 3.4 - 5.0 g/dL - 3.5 3.5   ALT 10 - 50 U/L - 56 56   AST 10 - 50 U/L - 44 44   LASHANDA INTERPRETATION Negative - - -   ANAP1 - - - -   ANAT1 - - - -   COMPLEMENT C3 81 - 157 mg/dL - - -   COMPLEMENT C4 13 - 39 mg/dL - - -   CK TOTAL 30 - 300 U/L - - -   CREATININE 0.67 - 1.17 mg/dL - 0.96 -   CREATININE (EXTERNAL) 0.7 - 1.3 mg/dL 1.0 - -   CRP <5.00 mg/L - - -   DNA <10.0 IU/mL - - -   GFR ESTIMATE >60 mL/min/1.73m2 - 90 -   HEMATOCRIT 40.0 - 53.0 % - 28.4(L) -   HEMOGLOBIN 13.3 - 17.7 g/dL - 8.8(L) -   HEPBANG Nonreactive - - -   HCVAB Nonreactive - - -    - 1,616 mg/dL - - -   WBC 4.0 - 11.0 10e3/uL - 12.6(H) -   RBC 4.40 - 5.90 10e6/uL - 3.08(L) -   RDW 10.0 - 15.0 % - 18.2(H) -   MCHC 31.5 - 36.5 g/dL - 31.0(L) -   MCV 78 - 100 fL - 92 -   PLATELET COUNT 150 - 450 10e3/uL - 344 -   RHEUMATOID FACTOR <12 IU/mL - - -   ESR 0 - 20 mm/hr - - -   QUANTIFERON-TB GOLD PLUS RESULT Negative - - -        Rheumatoid Factor   Date Value Ref Range Status   06/19/2022 <6 <12 IU/mL Final   ,   Cyclic Citrullinated Peptide Antibody IgG   Date Value Ref Range Status   06/19/2022 1.7 <7.0 U/mL Final     Comment:     Negative   ,  ,  ,   Scl-70 Antibody IgG   Date Value Ref Range Status   06/19/2022 Negative Negative Final     SSA (Ro) Antibody IgG   Date Value Ref Range Status   06/19/2022 Negative Negative Final     SSB (La) Antibody IgG   Date Value Ref Range Status   06/19/2022 Negative Negative Final   ,  ,   LASHANDA interpretation   Date Value Ref Range Status   06/19/2022 Positive (A) Negative Final     Comment:       Negative:              <1:40  Borderline Positive:   1:40 - 1:80  Positive:              >1:80     LASHANDA pattern 1   Date Value Ref Range Status   06/19/2022 Speckled  Final     LASHANDA titer 1   Date Value Ref Range Status   06/19/2022 1:320  Final   ,  ,   DNA (ds) Antibody   Date Value Ref Range Status   07/19/2022 13.0 (H) <10.0 IU/mL Final     Comment:     Equivocal   ,  ,  ,  ,  ,  ,   Hepatitis B Core Antibody Total   Date Value Ref Range Status   05/20/2022 Nonreactive Nonreactive Final   ,   Hepatitis B Surface Antigen   Date Value Ref Range Status   05/20/2022 Nonreactive Nonreactive Final   ,  ,  ,  ,   Quantiferon-TB Gold Plus   Date Value Ref Range Status   05/20/2022 Negative Negative Final     Comment:     No interferon gamma response to M.tuberculosis antigens was detected. Infection with M.tuberculosis is unlikely, however a single negative result does not exclude infection. In patients at high risk for infection, a second test should be considered in accordance with the 2017 ATS/IDSA/CDC Clinical Pract  ice Guidelines for Diagnosis of Tuberculosis in Adults and Children      TB1 Ag minus Nil Value   Date Value Ref Range Status   05/20/2022 -0.02 IU/mL Final     TB2 Ag minus Nil Value   Date Value Ref Range Status   05/20/2022 0.00 IU/mL Final     Mitogen minus Nil Result   Date Value Ref  Range Status   05/20/2022 9.91 IU/mL Final     Nil Result   Date Value Ref Range Status   05/20/2022 0.09 IU/mL Final   ,  ,  ,  ,  ,  ,  ,  ,  ,  ,   Neutrophil Cytoplasmic Antibody   Date Value Ref Range Status   06/19/2022 <1:10 <1:10 Final   ,   Neutrophil Cytoplasmic Antibody Pattern   Date Value Ref Range Status   06/19/2022   Final    The ANCA IFA is <1:10.  No further testing will be performed.   ,  ,  ,  ,  ,  ,  ,   Immunoglobulin G   Date Value Ref Range Status   06/19/2022 967 610 - 1,616 mg/dL Final   06/19/2022 961 610 - 1,616 mg/dL Final   ,  ,  ,  ,  ,   Scl-70 Antibody IgG   Date Value Ref Range Status   06/19/2022 Negative Negative Final         Again, thank you for allowing me to participate in the care of your patient.      Sincerely,    James Sterling MD

## 2022-10-25 NOTE — PATIENT INSTRUCTIONS
Diagnosis:  1.  Systemic lupus erythematosus: No evidence of active lupus on physical exam, by history, or in the laboratory recently.  I recommend withholding mycophenolate acid.  I recommend not starting mycophenolate back due to potential effect on diarrhea.  I recommend continuing hydroxychloroquine.    Plan:  1.  Hold mycophenolate and hold mycophenolate acid  2.  Continue hydroxychloroquine 200 mg twice daily.  While taking hydroxychloroquine, undergo annual ophthalmology evaluation to monitor for rare hydroxychloroquine retinal toxicity.  3.  Blood work for lupus activity in approximately 1 month, either at home, or through [employer].

## 2022-10-25 NOTE — NURSING NOTE
Chief Complaint   Patient presents with     Consult     SLE     Pulse 56, weight 65.8 kg (145 lb), SpO2 97 %.    Naveen Mendez on 10/25/2022 at 8:54 AM

## 2022-10-26 ENCOUNTER — ALLIED HEALTH/NURSE VISIT (OUTPATIENT)
Dept: CARDIOLOGY | Facility: CLINIC | Age: 62
End: 2022-10-26
Attending: INTERNAL MEDICINE
Payer: COMMERCIAL

## 2022-10-26 ENCOUNTER — ANTICOAGULATION THERAPY VISIT (OUTPATIENT)
Dept: ANTICOAGULATION | Facility: CLINIC | Age: 62
End: 2022-10-26

## 2022-10-26 ENCOUNTER — LAB (OUTPATIENT)
Dept: LAB | Facility: CLINIC | Age: 62
End: 2022-10-26
Payer: COMMERCIAL

## 2022-10-26 DIAGNOSIS — I50.22 CHRONIC SYSTOLIC CONGESTIVE HEART FAILURE (H): ICD-10-CM

## 2022-10-26 DIAGNOSIS — I50.22 CHRONIC SYSTOLIC CONGESTIVE HEART FAILURE (H): Primary | ICD-10-CM

## 2022-10-26 DIAGNOSIS — Z95.811 LVAD (LEFT VENTRICULAR ASSIST DEVICE) PRESENT (H): ICD-10-CM

## 2022-10-26 DIAGNOSIS — I50.20 HEART FAILURE WITH REDUCED EJECTION FRACTION, NYHA CLASS III (H): ICD-10-CM

## 2022-10-26 DIAGNOSIS — Z95.811 LEFT VENTRICULAR ASSIST DEVICE PRESENT (H): ICD-10-CM

## 2022-10-26 LAB
ALBUMIN SERPL BCG-MCNC: 3.6 G/DL (ref 3.5–5.2)
ALP SERPL-CCNC: 109 U/L (ref 40–129)
ALT SERPL W P-5'-P-CCNC: 7 U/L (ref 10–50)
ANION GAP SERPL CALCULATED.3IONS-SCNC: 12 MMOL/L (ref 7–15)
AST SERPL W P-5'-P-CCNC: 20 U/L (ref 10–50)
BASOPHILS # BLD AUTO: 0 10E3/UL (ref 0–0.2)
BASOPHILS NFR BLD AUTO: 0 %
BILIRUB SERPL-MCNC: 0.4 MG/DL
BUN SERPL-MCNC: 12.1 MG/DL (ref 8–23)
CALCIUM SERPL-MCNC: 9.5 MG/DL (ref 8.8–10.2)
CHLORIDE SERPL-SCNC: 103 MMOL/L (ref 98–107)
CREAT SERPL-MCNC: 0.75 MG/DL (ref 0.67–1.17)
CRP SERPL-MCNC: 83.7 MG/L
D DIMER PPP FEU-MCNC: 0.9 UG/ML FEU (ref 0–0.5)
DEPRECATED HCO3 PLAS-SCNC: 24 MMOL/L (ref 22–29)
EOSINOPHIL # BLD AUTO: 0.1 10E3/UL (ref 0–0.7)
EOSINOPHIL NFR BLD AUTO: 1 %
ERYTHROCYTE [DISTWIDTH] IN BLOOD BY AUTOMATED COUNT: 15.3 % (ref 10–15)
GFR SERPL CREATININE-BSD FRML MDRD: >90 ML/MIN/1.73M2
GLUCOSE SERPL-MCNC: 95 MG/DL (ref 70–99)
HCT VFR BLD AUTO: 30.9 % (ref 40–53)
HGB BLD-MCNC: 9.7 G/DL (ref 13.3–17.7)
IMM GRANULOCYTES # BLD: 0 10E3/UL
IMM GRANULOCYTES NFR BLD: 1 %
INR PPP: 2.37 (ref 0.85–1.15)
LDH SERPL L TO P-CCNC: 272 U/L (ref 0–250)
LYMPHOCYTES # BLD AUTO: 1.1 10E3/UL (ref 0.8–5.3)
LYMPHOCYTES NFR BLD AUTO: 14 %
MCH RBC QN AUTO: 29 PG (ref 26.5–33)
MCHC RBC AUTO-ENTMCNC: 31.4 G/DL (ref 31.5–36.5)
MCV RBC AUTO: 93 FL (ref 78–100)
MONOCYTES # BLD AUTO: 0.9 10E3/UL (ref 0–1.3)
MONOCYTES NFR BLD AUTO: 11 %
NEUTROPHILS # BLD AUTO: 6.1 10E3/UL (ref 1.6–8.3)
NEUTROPHILS NFR BLD AUTO: 73 %
NRBC # BLD AUTO: 0 10E3/UL
NRBC BLD AUTO-RTO: 0 /100
PLATELET # BLD AUTO: 239 10E3/UL (ref 150–450)
POTASSIUM SERPL-SCNC: 4.5 MMOL/L (ref 3.4–5.3)
PROT SERPL-MCNC: 7.4 G/DL (ref 6.4–8.3)
RBC # BLD AUTO: 3.34 10E6/UL (ref 4.4–5.9)
SODIUM SERPL-SCNC: 139 MMOL/L (ref 136–145)
WBC # BLD AUTO: 8.3 10E3/UL (ref 4–11)

## 2022-10-26 PROCEDURE — 99000 SPECIMEN HANDLING OFFICE-LAB: CPT | Performed by: PATHOLOGY

## 2022-10-26 PROCEDURE — 80053 COMPREHEN METABOLIC PANEL: CPT | Performed by: PATHOLOGY

## 2022-10-26 PROCEDURE — 85610 PROTHROMBIN TIME: CPT | Performed by: PATHOLOGY

## 2022-10-26 PROCEDURE — 85379 FIBRIN DEGRADATION QUANT: CPT | Mod: 90 | Performed by: PATHOLOGY

## 2022-10-26 PROCEDURE — 86140 C-REACTIVE PROTEIN: CPT | Performed by: PATHOLOGY

## 2022-10-26 PROCEDURE — 87070 CULTURE OTHR SPECIMN AEROBIC: CPT

## 2022-10-26 PROCEDURE — 87040 BLOOD CULTURE FOR BACTERIA: CPT | Mod: 90 | Performed by: PATHOLOGY

## 2022-10-26 PROCEDURE — 87205 SMEAR GRAM STAIN: CPT

## 2022-10-26 PROCEDURE — 36415 COLL VENOUS BLD VENIPUNCTURE: CPT | Performed by: PATHOLOGY

## 2022-10-26 PROCEDURE — 83615 LACTATE (LD) (LDH) ENZYME: CPT | Mod: 90 | Performed by: PATHOLOGY

## 2022-10-26 PROCEDURE — 87075 CULTR BACTERIA EXCEPT BLOOD: CPT

## 2022-10-26 PROCEDURE — 85025 COMPLETE CBC W/AUTO DIFF WBC: CPT | Performed by: PATHOLOGY

## 2022-10-26 RX ORDER — DOXYCYCLINE 100 MG/1
100 CAPSULE ORAL 2 TIMES DAILY
Qty: 14 CAPSULE | Refills: 0 | Status: ON HOLD | OUTPATIENT
Start: 2022-10-26 | End: 2022-11-02

## 2022-10-26 NOTE — NURSING NOTE
Tanja called the VAD coordinator on call to report new green drainage form the DLES site. She said they have been doing the daily dressing change every other day. I asked them to come to the clinic now and I will assess the site and send cultures.    The site is slightly pink. Dandy says he has had a strong post-covid cough and the fluid has been leakig out of his driveline site. Wound cultures, blood cultures x2, CRP, CBC sent. Doxycycline started. ID appointment requested within 14 days.

## 2022-10-26 NOTE — PATIENT INSTRUCTIONS
You came in today for a culture of your driveline site. Please stop at the lab right now on your way out of the clinic to get labs drawn. Start taking doxycycline 100mg twice per day starting tonight. We will set up an infectious disease appointment for you within the next two weeks.

## 2022-10-26 NOTE — PROGRESS NOTES
ANTICOAGULATION MANAGEMENT     Dandy Sands 62 year old male is on warfarin with therapeutic INR result. (Goal INR 2.0-3.0)    Recent labs: (last 7 days)     10/26/22  1604   INR 2.37*       ASSESSMENT       Source(s): Chart Review and Patient/Caregiver Call       Warfarin doses taken: Warfarin taken as instructed    Diet: No new diet changes identified    New illness, injury, or hospitalization: Yes: Patient has new drainage around site     Medication/supplement changes: Patient is starting a course of Doxycycline.     Signs or symptoms of bleeding or clotting: Yes: Patient has daily nosebleeds that last about 10 minutes    Previous INR: Therapeutic last visit; previously outside of goal range    Additional findings: Amos and patient are very set on having the same dose everyday to make dosing easier to remember.  Writer attempts to explain that at this point with the new VAD and many changes happening it is very difficult to give the same dosing daily.  Amos agrees and reports they will be bringing up thier concerns with the cardiology team tomorrow.        PLAN     Recommended plan for ongoing change(s) affecting INR     Dosing Instructions: Continue your current warfarin dose with next INR in 5 days       Summary  As of 10/26/2022    Full warfarin instructions:  7.5 mg every Sun, Wed; 5 mg all other days; Starting 10/26/2022   Next INR check:  10/31/2022             Telephone call with  Amos and message left for DIOMEDES Morgan.  who verbalizes understanding and agrees to plan and who agrees to plan and repeated back plan correctly    Orders given to  Homecare nurse/facility to recheck    Education provided:     Taking warfarin: Importance of taking warfarin as instructed    Goal range and lab monitoring: goal range and significance of current result and Importance of therapeutic range    Plan made per [facility] anticoagulation protocol and per LVAD protocol    Holli Silva RN  Anticoagulation  Clinic  10/26/2022    _______________________________________________________________________     Anticoagulation Episode Summary     Current INR goal:  2.0-3.0   TTR:  85.4 % (3 wk)   Target end date:  Indefinite   Send INR reminders to:  ANTICOAG LVAD    Indications    Chronic systolic congestive heart failure (H) [I50.22]  LVAD (left ventricular assist device) present (H) [Z95.811]  Heart failure with reduced ejection fraction  NYHA class III (H) [I50.20]  Left ventricular assist device present (H) [Z95.811]           Comments:  Follow VAD Anticoag protocol:Yes: HeartMate 3   Bridging: No bridging epistaxis.   Date VAD placed: 7/8/22         Anticoagulation Care Providers     Provider Role Specialty Phone number    Kelly Lora MD Referring Cardiovascular Disease [phone]    Behzad Zeng MD Referring Student in organized health care education/training program [phone]

## 2022-10-27 ENCOUNTER — ANCILLARY PROCEDURE (OUTPATIENT)
Dept: CARDIOLOGY | Facility: CLINIC | Age: 62
End: 2022-10-27
Attending: INTERNAL MEDICINE
Payer: COMMERCIAL

## 2022-10-27 ENCOUNTER — APPOINTMENT (OUTPATIENT)
Dept: GENERAL RADIOLOGY | Facility: CLINIC | Age: 62
DRG: 314 | End: 2022-10-27
Attending: EMERGENCY MEDICINE
Payer: COMMERCIAL

## 2022-10-27 ENCOUNTER — TELEPHONE (OUTPATIENT)
Dept: INFECTIOUS DISEASES | Facility: CLINIC | Age: 62
End: 2022-10-27

## 2022-10-27 ENCOUNTER — HOSPITAL ENCOUNTER (INPATIENT)
Facility: CLINIC | Age: 62
LOS: 7 days | Discharge: HOME HEALTH | DRG: 314 | End: 2022-11-03
Attending: EMERGENCY MEDICINE | Admitting: INTERNAL MEDICINE
Payer: COMMERCIAL

## 2022-10-27 ENCOUNTER — CARE COORDINATION (OUTPATIENT)
Dept: CARDIOLOGY | Facility: CLINIC | Age: 62
End: 2022-10-27

## 2022-10-27 ENCOUNTER — OFFICE VISIT (OUTPATIENT)
Dept: CARDIOLOGY | Facility: CLINIC | Age: 62
DRG: 314 | End: 2022-10-27
Attending: INTERNAL MEDICINE
Payer: COMMERCIAL

## 2022-10-27 ENCOUNTER — ANCILLARY PROCEDURE (OUTPATIENT)
Dept: CT IMAGING | Facility: CLINIC | Age: 62
End: 2022-10-27
Attending: INTERNAL MEDICINE
Payer: COMMERCIAL

## 2022-10-27 VITALS
HEIGHT: 66 IN | SYSTOLIC BLOOD PRESSURE: 80 MMHG | BODY MASS INDEX: 22.92 KG/M2 | OXYGEN SATURATION: 90 % | HEART RATE: 65 BPM | WEIGHT: 142.6 LBS

## 2022-10-27 DIAGNOSIS — Z95.811 LVAD (LEFT VENTRICULAR ASSIST DEVICE) PRESENT (H): ICD-10-CM

## 2022-10-27 DIAGNOSIS — Z79.01 LONG TERM (CURRENT) USE OF ANTICOAGULANTS: ICD-10-CM

## 2022-10-27 DIAGNOSIS — R11.2 NAUSEA AND VOMITING, UNSPECIFIED VOMITING TYPE: ICD-10-CM

## 2022-10-27 DIAGNOSIS — R68.83 CHILLS: ICD-10-CM

## 2022-10-27 DIAGNOSIS — R50.9 FEVER AND CHILLS: ICD-10-CM

## 2022-10-27 DIAGNOSIS — T82.9XXS COMPLICATION INVOLVING LEFT VENTRICULAR ASSIST DEVICE (LVAD), SEQUELA: ICD-10-CM

## 2022-10-27 DIAGNOSIS — I50.22 CHRONIC SYSTOLIC CONGESTIVE HEART FAILURE (H): ICD-10-CM

## 2022-10-27 DIAGNOSIS — I50.9 DECOMPENSATED HEART FAILURE (H): ICD-10-CM

## 2022-10-27 DIAGNOSIS — A04.72 C. DIFFICILE DIARRHEA: ICD-10-CM

## 2022-10-27 DIAGNOSIS — Z45.2 PICC (PERIPHERALLY INSERTED CENTRAL CATHETER) IN PLACE: ICD-10-CM

## 2022-10-27 DIAGNOSIS — Z95.811 LEFT VENTRICULAR ASSIST DEVICE PRESENT (H): ICD-10-CM

## 2022-10-27 DIAGNOSIS — I50.22 CHRONIC SYSTOLIC CONGESTIVE HEART FAILURE (H): Primary | ICD-10-CM

## 2022-10-27 DIAGNOSIS — R53.83 OTHER FATIGUE: ICD-10-CM

## 2022-10-27 DIAGNOSIS — R04.0 RECURRENT EPISTAXIS: ICD-10-CM

## 2022-10-27 DIAGNOSIS — K21.9 GASTROESOPHAGEAL REFLUX DISEASE WITHOUT ESOPHAGITIS: ICD-10-CM

## 2022-10-27 DIAGNOSIS — R57.0 CARDIOGENIC SHOCK (H): ICD-10-CM

## 2022-10-27 DIAGNOSIS — R42 DIZZINESS: ICD-10-CM

## 2022-10-27 DIAGNOSIS — U07.1 COVID: Primary | ICD-10-CM

## 2022-10-27 DIAGNOSIS — Z86.16 PERSONAL HISTORY OF COVID-19: ICD-10-CM

## 2022-10-27 DIAGNOSIS — R04.0 EPISTAXIS: ICD-10-CM

## 2022-10-27 DIAGNOSIS — I50.20 HEART FAILURE WITH REDUCED EJECTION FRACTION, NYHA CLASS III (H): ICD-10-CM

## 2022-10-27 DIAGNOSIS — I50.9 HEART FAILURE, UNSPECIFIED HF CHRONICITY, UNSPECIFIED HEART FAILURE TYPE (H): ICD-10-CM

## 2022-10-27 DIAGNOSIS — R42 LIGHTHEADEDNESS: ICD-10-CM

## 2022-10-27 DIAGNOSIS — K64.4 EXTERNAL HEMORRHOIDS: Chronic | ICD-10-CM

## 2022-10-27 DIAGNOSIS — Z20.822 CONTACT WITH AND (SUSPECTED) EXPOSURE TO COVID-19: ICD-10-CM

## 2022-10-27 DIAGNOSIS — R19.7 ACUTE DIARRHEA: ICD-10-CM

## 2022-10-27 LAB
BASOPHILS # BLD AUTO: 0 10E3/UL (ref 0–0.2)
BASOPHILS NFR BLD AUTO: 0 %
CA-I BLD-MCNC: 4.8 MG/DL (ref 4.4–5.2)
CPB POCT: NO
EOSINOPHIL # BLD AUTO: 0 10E3/UL (ref 0–0.7)
EOSINOPHIL NFR BLD AUTO: 0 %
ERYTHROCYTE [DISTWIDTH] IN BLOOD BY AUTOMATED COUNT: 15.5 % (ref 10–15)
FLUAV RNA SPEC QL NAA+PROBE: NEGATIVE
FLUBV RNA RESP QL NAA+PROBE: NEGATIVE
GLUCOSE BLD-MCNC: 110 MG/DL (ref 70–99)
HCO3 BLDV-SCNC: 23 MMOL/L (ref 21–28)
HCT VFR BLD AUTO: 31.8 % (ref 40–53)
HCT VFR BLD CALC: 30 % (ref 40–53)
HGB BLD-MCNC: 10.2 G/DL (ref 13.3–17.7)
HGB BLD-MCNC: 9.8 G/DL (ref 13.3–17.7)
IMM GRANULOCYTES # BLD: 0.1 10E3/UL
IMM GRANULOCYTES NFR BLD: 0 %
INR PPP: 2.38 (ref 0.85–1.15)
LACTATE SERPL-SCNC: 1.3 MMOL/L (ref 0.7–2)
LYMPHOCYTES # BLD AUTO: 0.9 10E3/UL (ref 0.8–5.3)
LYMPHOCYTES NFR BLD AUTO: 7 %
MCH RBC QN AUTO: 29.1 PG (ref 26.5–33)
MCHC RBC AUTO-ENTMCNC: 30.8 G/DL (ref 31.5–36.5)
MCV RBC AUTO: 94 FL (ref 78–100)
MDC_IDC_EPISODE_DTM: NORMAL
MDC_IDC_EPISODE_DURATION: 5 S
MDC_IDC_EPISODE_DURATION: NORMAL S
MDC_IDC_EPISODE_DURATION: NORMAL S
MDC_IDC_EPISODE_ID: 58
MDC_IDC_EPISODE_ID: 59
MDC_IDC_EPISODE_ID: 60
MDC_IDC_EPISODE_TYPE: NORMAL
MDC_IDC_LEAD_IMPLANT_DT: NORMAL
MDC_IDC_LEAD_IMPLANT_DT: NORMAL
MDC_IDC_LEAD_LOCATION: NORMAL
MDC_IDC_LEAD_LOCATION: NORMAL
MDC_IDC_LEAD_LOCATION_DETAIL_1: NORMAL
MDC_IDC_LEAD_LOCATION_DETAIL_1: NORMAL
MDC_IDC_LEAD_MFG: NORMAL
MDC_IDC_LEAD_MFG: NORMAL
MDC_IDC_LEAD_MODEL: NORMAL
MDC_IDC_LEAD_MODEL: NORMAL
MDC_IDC_LEAD_POLARITY_TYPE: NORMAL
MDC_IDC_LEAD_POLARITY_TYPE: NORMAL
MDC_IDC_LEAD_SERIAL: NORMAL
MDC_IDC_LEAD_SERIAL: NORMAL
MDC_IDC_MSMT_BATTERY_DTM: NORMAL
MDC_IDC_MSMT_BATTERY_REMAINING_LONGEVITY: 55 MO
MDC_IDC_MSMT_BATTERY_RRT_TRIGGER: 2.73
MDC_IDC_MSMT_BATTERY_STATUS: NORMAL
MDC_IDC_MSMT_BATTERY_VOLTAGE: 2.96 V
MDC_IDC_MSMT_CAP_CHARGE_DTM: NORMAL
MDC_IDC_MSMT_CAP_CHARGE_ENERGY: 18 J
MDC_IDC_MSMT_CAP_CHARGE_TIME: 3.94
MDC_IDC_MSMT_CAP_CHARGE_TYPE: NORMAL
MDC_IDC_MSMT_LEADCHNL_RA_IMPEDANCE_VALUE: 342 OHM
MDC_IDC_MSMT_LEADCHNL_RA_PACING_THRESHOLD_AMPLITUDE: 0.5 V
MDC_IDC_MSMT_LEADCHNL_RA_PACING_THRESHOLD_PULSEWIDTH: 0.4 MS
MDC_IDC_MSMT_LEADCHNL_RA_SENSING_INTR_AMPL: 1 MV
MDC_IDC_MSMT_LEADCHNL_RV_IMPEDANCE_VALUE: 551 OHM
MDC_IDC_MSMT_LEADCHNL_RV_PACING_THRESHOLD_AMPLITUDE: 2.75 V
MDC_IDC_MSMT_LEADCHNL_RV_PACING_THRESHOLD_PULSEWIDTH: 1 MS
MDC_IDC_MSMT_LEADCHNL_RV_SENSING_INTR_AMPL: 0.9 MV
MDC_IDC_PG_IMPLANT_DTM: NORMAL
MDC_IDC_PG_MFG: NORMAL
MDC_IDC_PG_MODEL: NORMAL
MDC_IDC_PG_SERIAL: NORMAL
MDC_IDC_PG_TYPE: NORMAL
MDC_IDC_SESS_CLINIC_NAME: NORMAL
MDC_IDC_SESS_DTM: NORMAL
MDC_IDC_SESS_TYPE: NORMAL
MDC_IDC_SET_BRADY_AT_MODE_SWITCH_RATE: 171 {BEATS}/MIN
MDC_IDC_SET_BRADY_HYSTRATE: NORMAL
MDC_IDC_SET_BRADY_LOWRATE: 50 {BEATS}/MIN
MDC_IDC_SET_BRADY_MAX_SENSOR_RATE: 115 {BEATS}/MIN
MDC_IDC_SET_BRADY_MAX_TRACKING_RATE: 130 {BEATS}/MIN
MDC_IDC_SET_BRADY_MODE: NORMAL
MDC_IDC_SET_BRADY_PAV_DELAY_LOW: 350 MS
MDC_IDC_SET_BRADY_SAV_DELAY_LOW: 350 MS
MDC_IDC_SET_LEADCHNL_RA_PACING_AMPLITUDE: 1.5 V
MDC_IDC_SET_LEADCHNL_RA_PACING_ANODE_ELECTRODE_1: NORMAL
MDC_IDC_SET_LEADCHNL_RA_PACING_ANODE_LOCATION_1: NORMAL
MDC_IDC_SET_LEADCHNL_RA_PACING_CAPTURE_MODE: NORMAL
MDC_IDC_SET_LEADCHNL_RA_PACING_CATHODE_ELECTRODE_1: NORMAL
MDC_IDC_SET_LEADCHNL_RA_PACING_CATHODE_LOCATION_1: NORMAL
MDC_IDC_SET_LEADCHNL_RA_PACING_POLARITY: NORMAL
MDC_IDC_SET_LEADCHNL_RA_PACING_PULSEWIDTH: 0.4 MS
MDC_IDC_SET_LEADCHNL_RA_SENSING_ANODE_ELECTRODE_1: NORMAL
MDC_IDC_SET_LEADCHNL_RA_SENSING_ANODE_LOCATION_1: NORMAL
MDC_IDC_SET_LEADCHNL_RA_SENSING_CATHODE_ELECTRODE_1: NORMAL
MDC_IDC_SET_LEADCHNL_RA_SENSING_CATHODE_LOCATION_1: NORMAL
MDC_IDC_SET_LEADCHNL_RA_SENSING_POLARITY: NORMAL
MDC_IDC_SET_LEADCHNL_RA_SENSING_SENSITIVITY: 0.15 MV
MDC_IDC_SET_LEADCHNL_RV_PACING_AMPLITUDE: 5 V
MDC_IDC_SET_LEADCHNL_RV_PACING_ANODE_ELECTRODE_1: NORMAL
MDC_IDC_SET_LEADCHNL_RV_PACING_ANODE_LOCATION_1: NORMAL
MDC_IDC_SET_LEADCHNL_RV_PACING_CAPTURE_MODE: NORMAL
MDC_IDC_SET_LEADCHNL_RV_PACING_CATHODE_ELECTRODE_1: NORMAL
MDC_IDC_SET_LEADCHNL_RV_PACING_CATHODE_LOCATION_1: NORMAL
MDC_IDC_SET_LEADCHNL_RV_PACING_POLARITY: NORMAL
MDC_IDC_SET_LEADCHNL_RV_PACING_PULSEWIDTH: 1 MS
MDC_IDC_SET_LEADCHNL_RV_SENSING_ANODE_ELECTRODE_1: NORMAL
MDC_IDC_SET_LEADCHNL_RV_SENSING_ANODE_LOCATION_1: NORMAL
MDC_IDC_SET_LEADCHNL_RV_SENSING_CATHODE_ELECTRODE_1: NORMAL
MDC_IDC_SET_LEADCHNL_RV_SENSING_CATHODE_LOCATION_1: NORMAL
MDC_IDC_SET_LEADCHNL_RV_SENSING_POLARITY: NORMAL
MDC_IDC_SET_LEADCHNL_RV_SENSING_SENSITIVITY: 0.3 MV
MDC_IDC_SET_ZONE_DETECTION_BEATS_DENOMINATOR: 40 {BEATS}
MDC_IDC_SET_ZONE_DETECTION_BEATS_NUMERATOR: 30 {BEATS}
MDC_IDC_SET_ZONE_DETECTION_INTERVAL: 280 MS
MDC_IDC_SET_ZONE_DETECTION_INTERVAL: 320 MS
MDC_IDC_SET_ZONE_DETECTION_INTERVAL: 350 MS
MDC_IDC_SET_ZONE_DETECTION_INTERVAL: 350 MS
MDC_IDC_SET_ZONE_DETECTION_INTERVAL: 400 MS
MDC_IDC_SET_ZONE_TYPE: NORMAL
MDC_IDC_STAT_AT_BURDEN_PERCENT: 11.9 %
MDC_IDC_STAT_AT_DTM_END: NORMAL
MDC_IDC_STAT_AT_DTM_START: NORMAL
MDC_IDC_STAT_BRADY_AP_VP_PERCENT: 0 %
MDC_IDC_STAT_BRADY_AP_VS_PERCENT: 0.13 %
MDC_IDC_STAT_BRADY_AS_VP_PERCENT: 0.17 %
MDC_IDC_STAT_BRADY_AS_VS_PERCENT: 99.69 %
MDC_IDC_STAT_BRADY_DTM_END: NORMAL
MDC_IDC_STAT_BRADY_DTM_START: NORMAL
MDC_IDC_STAT_BRADY_RA_PERCENT_PACED: 0.13 %
MDC_IDC_STAT_BRADY_RV_PERCENT_PACED: 0.18 %
MDC_IDC_STAT_EPISODE_RECENT_COUNT: 0
MDC_IDC_STAT_EPISODE_RECENT_COUNT: 1
MDC_IDC_STAT_EPISODE_RECENT_COUNT: 2
MDC_IDC_STAT_EPISODE_RECENT_COUNT_DTM_END: NORMAL
MDC_IDC_STAT_EPISODE_RECENT_COUNT_DTM_START: NORMAL
MDC_IDC_STAT_EPISODE_TOTAL_COUNT: 0
MDC_IDC_STAT_EPISODE_TOTAL_COUNT: 1
MDC_IDC_STAT_EPISODE_TOTAL_COUNT: 1
MDC_IDC_STAT_EPISODE_TOTAL_COUNT: 10
MDC_IDC_STAT_EPISODE_TOTAL_COUNT: 36
MDC_IDC_STAT_EPISODE_TOTAL_COUNT_DTM_END: NORMAL
MDC_IDC_STAT_EPISODE_TOTAL_COUNT_DTM_START: NORMAL
MDC_IDC_STAT_EPISODE_TYPE: NORMAL
MDC_IDC_STAT_TACHYTHERAPY_ATP_DELIVERED_RECENT: 0
MDC_IDC_STAT_TACHYTHERAPY_ATP_DELIVERED_TOTAL: 0
MDC_IDC_STAT_TACHYTHERAPY_RECENT_DTM_END: NORMAL
MDC_IDC_STAT_TACHYTHERAPY_RECENT_DTM_START: NORMAL
MDC_IDC_STAT_TACHYTHERAPY_SHOCKS_ABORTED_RECENT: 0
MDC_IDC_STAT_TACHYTHERAPY_SHOCKS_ABORTED_TOTAL: 0
MDC_IDC_STAT_TACHYTHERAPY_SHOCKS_DELIVERED_RECENT: 0
MDC_IDC_STAT_TACHYTHERAPY_SHOCKS_DELIVERED_TOTAL: 1
MDC_IDC_STAT_TACHYTHERAPY_TOTAL_DTM_END: NORMAL
MDC_IDC_STAT_TACHYTHERAPY_TOTAL_DTM_START: NORMAL
MONOCYTES # BLD AUTO: 0.8 10E3/UL (ref 0–1.3)
MONOCYTES NFR BLD AUTO: 6 %
NEUTROPHILS # BLD AUTO: 11.3 10E3/UL (ref 1.6–8.3)
NEUTROPHILS NFR BLD AUTO: 87 %
NRBC # BLD AUTO: 0 10E3/UL
NRBC BLD AUTO-RTO: 0 /100
PCO2 BLDV: 28 MM HG (ref 40–50)
PH BLDV: 7.52 [PH] (ref 7.32–7.43)
PLATELET # BLD AUTO: 247 10E3/UL (ref 150–450)
PO2 BLDV: 56 MM HG (ref 25–47)
POTASSIUM BLD-SCNC: 4.3 MMOL/L (ref 3.4–5.3)
RBC # BLD AUTO: 3.37 10E6/UL (ref 4.4–5.9)
RSV RNA SPEC NAA+PROBE: NEGATIVE
SAO2 % BLDV: 92 % (ref 94–100)
SARS-COV-2 RNA RESP QL NAA+PROBE: NEGATIVE
SODIUM BLD-SCNC: 136 MMOL/L (ref 133–144)
WBC # BLD AUTO: 13.1 10E3/UL (ref 4–11)

## 2022-10-27 PROCEDURE — 71046 X-RAY EXAM CHEST 2 VIEWS: CPT | Mod: 26 | Performed by: RADIOLOGY

## 2022-10-27 PROCEDURE — 71260 CT THORAX DX C+: CPT | Mod: GC | Performed by: RADIOLOGY

## 2022-10-27 PROCEDURE — 83615 LACTATE (LD) (LDH) ENZYME: CPT | Performed by: EMERGENCY MEDICINE

## 2022-10-27 PROCEDURE — 99285 EMERGENCY DEPT VISIT HI MDM: CPT | Mod: 25 | Performed by: EMERGENCY MEDICINE

## 2022-10-27 PROCEDURE — 93750 INTERROGATION VAD IN PERSON: CPT | Performed by: INTERNAL MEDICINE

## 2022-10-27 PROCEDURE — 214N000001 HC R&B CCU UMMC

## 2022-10-27 PROCEDURE — 99285 EMERGENCY DEPT VISIT HI MDM: CPT | Performed by: EMERGENCY MEDICINE

## 2022-10-27 PROCEDURE — 74177 CT ABD & PELVIS W/CONTRAST: CPT | Mod: GC | Performed by: RADIOLOGY

## 2022-10-27 PROCEDURE — 82947 ASSAY GLUCOSE BLOOD QUANT: CPT

## 2022-10-27 PROCEDURE — 87637 SARSCOV2&INF A&B&RSV AMP PRB: CPT | Performed by: EMERGENCY MEDICINE

## 2022-10-27 PROCEDURE — G0463 HOSPITAL OUTPT CLINIC VISIT: HCPCS | Mod: 25

## 2022-10-27 PROCEDURE — 85025 COMPLETE CBC W/AUTO DIFF WBC: CPT | Performed by: EMERGENCY MEDICINE

## 2022-10-27 PROCEDURE — 36415 COLL VENOUS BLD VENIPUNCTURE: CPT | Performed by: EMERGENCY MEDICINE

## 2022-10-27 PROCEDURE — 84484 ASSAY OF TROPONIN QUANT: CPT | Performed by: EMERGENCY MEDICINE

## 2022-10-27 PROCEDURE — 82947 ASSAY GLUCOSE BLOOD QUANT: CPT | Performed by: EMERGENCY MEDICINE

## 2022-10-27 PROCEDURE — 71046 X-RAY EXAM CHEST 2 VIEWS: CPT

## 2022-10-27 PROCEDURE — 99215 OFFICE O/P EST HI 40 MIN: CPT | Mod: 25 | Performed by: INTERNAL MEDICINE

## 2022-10-27 PROCEDURE — 99222 1ST HOSP IP/OBS MODERATE 55: CPT | Mod: 25 | Performed by: INTERNAL MEDICINE

## 2022-10-27 PROCEDURE — 85610 PROTHROMBIN TIME: CPT | Performed by: EMERGENCY MEDICINE

## 2022-10-27 PROCEDURE — 93283 PRGRMG EVAL IMPLANTABLE DFB: CPT | Performed by: INTERNAL MEDICINE

## 2022-10-27 PROCEDURE — 86140 C-REACTIVE PROTEIN: CPT | Performed by: EMERGENCY MEDICINE

## 2022-10-27 PROCEDURE — 87040 BLOOD CULTURE FOR BACTERIA: CPT | Performed by: EMERGENCY MEDICINE

## 2022-10-27 PROCEDURE — C9803 HOPD COVID-19 SPEC COLLECT: HCPCS | Performed by: EMERGENCY MEDICINE

## 2022-10-27 PROCEDURE — 83605 ASSAY OF LACTIC ACID: CPT | Performed by: EMERGENCY MEDICINE

## 2022-10-27 PROCEDURE — 82803 BLOOD GASES ANY COMBINATION: CPT

## 2022-10-27 RX ORDER — FAMOTIDINE 20 MG/1
20 TABLET, FILM COATED ORAL 2 TIMES DAILY
Qty: 60 TABLET | Refills: 11 | Status: SHIPPED | OUTPATIENT
Start: 2022-10-27 | End: 2023-08-24

## 2022-10-27 RX ORDER — IOPAMIDOL 755 MG/ML
78 INJECTION, SOLUTION INTRAVASCULAR ONCE
Status: COMPLETED | OUTPATIENT
Start: 2022-10-27 | End: 2022-10-27

## 2022-10-27 RX ADMIN — IOPAMIDOL 78 ML: 755 INJECTION, SOLUTION INTRAVASCULAR at 15:32

## 2022-10-27 ASSESSMENT — PAIN SCALES - GENERAL: PAINLEVEL: MODERATE PAIN (4)

## 2022-10-27 ASSESSMENT — ACTIVITIES OF DAILY LIVING (ADL): ADLS_ACUITY_SCORE: 39

## 2022-10-27 NOTE — PATIENT INSTRUCTIONS
Medications:  Stop taking phenergan, thiamin, sertraline, and zinc  Start taking famotidine 20mg twice per day for your gastric reflux  Do not take old prescription medicines that you have in your house without talking to you primary doctor or VAD coordinator first.      Instructions:  Today I showed you how to use the shower bag. Do not use the shower bag until your vad coordinator tells you it is OK to shower.  Today I showed you the weekly dressing change kit. Do not start using the weekly kit until your vad coordinator tells you it is OK to switch.  Get the chest/abdomen/pelvis appointment today at 530pm    Patient Amos and Tanja presented to clinic for education on showering and the use of the shower bag.     Provided instructions on how to safely use shower bag and cover LVAD drive line exit site dressing and modular cable during showers.   Instructed patient that if they are using a daily dressing, it should be changed immediately following a shower.  Instructed patient that if they are using a weekly dressing, dressing can remain in place for up to 1 week.  The weekly dressing must be changed if there is an accumulation of drainage.  The weekly dressing must be changed if there is moisture under the dressing after the shower.   The weekly dressing needs to be changed earlier if the dressing has come loose after the shower.  The shower bag must stay attached to patient at all times throughout the shower.     Patient and Amos presented to clinic for education on LVAD drive line exit site weekly dressing change.     Provided instructions on performing the weekly dressing and demonstrated on dressing board.     Observed Tnaja on changing the weekly dressing. Dressing was changed without difficulty.      Instructed patient that the weekly dressing can remain in place for up to 1 week.  The weekly dressing must be changed if there is an accumulation of drainage, vad coordinator should be notified.  The weekly  dressing must be changed if there is moisture under the dressing.   The weekly dressing needs to be changed earlier if the dressing has come loose.     Follow-up: (make these appointments before you leave)  1. Please follow-up with VAD LAURA Shraddha Menjivar on 11/3 with labs prior.   2. Please follow-up with Dr. Lora in later November 2022 with labs prior.    3. You have an infectious disease appointment on 10/28.      Page the VAD Coordinator on call if you gain more than 3 lb in a day or 5 in a week. Please also page if you feel unwell or have alarms.   Great to see you in clinic today. To Page the VAD Coordinator on call, dial [phone] option #4 and ask to speak to the VAD coordinator on call.

## 2022-10-27 NOTE — TELEPHONE ENCOUNTER
[facility] Call Center    Phone Message    May a detailed message be left on voicemail: no     Reason for Call: Other: Ron from LVAD called to schedule this patient for an appointment next week. He states that the patient has a possible infection in their drive line. There is no availability in the requested timeframe. Please follow up.Thank you.    Action Taken: Other: ID    Travel Screening: Not Applicable

## 2022-10-27 NOTE — PROGRESS NOTES
Cardiology Clinic Note    HPI  Mr. Dandy Sands is a pleasant 61 year old male with a past medical history of SLE, antiphospholipid syndrome, pulmonary embolism (on warfarin), ICM (EF 10-15%) s/p HM3 LVAD (7/28/22), and C. Diff who presents to clinic today for follow up.    I first met him May 2022.  He has brought in for consideration of elevated risk bypass surgery, however we elected to do to a multivessel PCI.  He subsequently returned a month later in cardiogenic shock from [place], with LFTs over thousand.  June 2022 we performed a evaluation for LVAD and transplant, and we had him at one point listed for transplant, however due to his elevated antibody levels associated with his lupus, we are unable to get a adequate donor offer, thus we proceeded with LVAD therapy as probable destination given his amount of antibodies.  His LVAD surgery was complicated by severe right ventricular failure requiring temporary RVAD.  He also had severe delirium and at one point tearing out his bridled nasogastric tube, causing a nasal septal perforation and severe nosebleeding.  He was discharged to rehab and then we presented with severe C. difficile diarrhea, dehydration, low flow alarms, as well as COVID-19 infection.  I had seen him one time in clinic 10/7/22, but he has had several admissions.    He presents with his son.  [facility]  He will still have occasional lightheaded and dizziness upon standing.  He no longer is having low flow alarms, we did get him a low flow controller as his echo speed optimization and right heart catheterization still showed low PCWP and low LVIDD..  Speed is set to 5100 rpm but no longer having speed drops.  He did have an episode of epistaxis when his INR get to 2.8.  He otherwise denies any chest pain or shortness of breath.  He does not have any lower extremity edema.    He has noted increased abdominal discomfort, and nursing bring pus coming  out of his driveline.  He also noted that his ICD is beeping.    Cardiac Medications:  - Hydralazine 25 mg TID  - Warfarin, no aspirin  - Lisinopril 10 mg daily  - Digoxin 125 mcg every day  - Atorvastatin 40 mg nightly    PAST MEDICAL HISTORY:  Patient Active Problem List   Diagnosis     Decompensated heart failure (H)     Cardiogenic shock (H)     LVAD (left ventricular assist device) present (H)     Chronic systolic congestive heart failure (H)     Heart failure with reduced ejection fraction, NYHA class III (H)     Lightheadedness     Recurrent epistaxis     Epistaxis     Complication involving left ventricular assist device (LVAD)     Acute diarrhea     C. difficile diarrhea     Left ventricular assist device present (H)     Chills     COVID     Nausea and vomiting, unspecified vomiting type        FAMILY HISTORY:  Father with hypertension, mother with coronary disease requiring a 5 vessel CABG, sister with alcohol and drug use    SOCIAL HISTORY:  Former smoker, quit in 2009 after 25 pack years  Very rare alcohol use    ALLERGIES:  No Known Allergies    CURRENT MEDICATIONS:  Current Outpatient Medications   Medication Sig Dispense Refill     acetaminophen (TYLENOL) 325 MG tablet Take 3 tablets (975 mg) by mouth every 8 hours as needed for mild pain 270 tablet 0     aspirin (ASA) 81 MG chewable tablet Take 1 tablet (81 mg) by mouth daily (Patient not taking: Reported on 10/25/2022) 30 tablet 0     atorvastatin (LIPITOR) 40 MG tablet Take 1 tablet (40 mg) by mouth daily 30 tablet 0     digoxin (LANOXIN) 125 MCG tablet Take 1 tablet (125 mcg) by mouth daily 30 tablet 0     doxycycline hyclate (VIBRAMYCIN) 100 MG capsule Take 1 capsule (100 mg) by mouth 2 times daily 14 capsule 0     hydrALAZINE (APRESOLINE) 25 MG tablet Take 1 tablet (25 mg) by mouth 3 times daily 90 tablet 0     hydrocortisone 1 % CREA cream Place 1 g rectally daily as needed for itching 1 g 0     hydroxychloroquine (PLAQUENIL) 200 MG tablet Take  1 tablet (200 mg) by mouth 2 times daily 60 tablet 4     lisinopril (ZESTRIL) 10 MG tablet Take 1 tablet (10 mg) by mouth daily 30 tablet 0     loperamide (IMODIUM) 2 MG capsule Take 1 capsule (2 mg) by mouth 2 times daily as needed for diarrhea 30 capsule 0     melatonin 5 MG tablet Take 1 tablet (5 mg) by mouth At Bedtime 30 tablet 0     methylcellulose (CITRUCEL) 500 MG TABS tablet Take 2 tablets (1,000 mg) by mouth 2 times daily 60 tablet 0     multivitamin w/minerals (THERA-VIT-M) tablet Take 1 tablet by mouth daily  0     pramox-pe-glycerin-petrolatum (PREPARATION H) 1-0.25-14.4-15 % CREA cream Place rectally 2 times daily as needed for hemorrhoids Apply immediately after a bowel movement. 51 g 0     promethazine (PHENERGAN) 12.5 MG tablet Take 1 tablet (12.5 mg) by mouth every 6 hours as needed for nausea or vomiting 30 tablet 1     saline nasal (AYR SALINE) GEL topical gel Apply 1 applicator into each nare nightly as needed for congestion       sertraline (ZOLOFT) 25 MG tablet Take 3 tablets (75 mg) by mouth daily (Patient not taking: Reported on 10/25/2022) 30 tablet 0     sodium chloride (OCEAN) 0.65 % nasal spray Spray 2 sprays into both nostrils every 4 hours as needed for congestion       tamsulosin (FLOMAX) 0.4 MG capsule Take 1 capsule (0.4 mg) by mouth daily 30 capsule 0     thiamine (B-1) 100 MG tablet Take 1 tablet (100 mg) by mouth daily 30 tablet 0     warfarin ANTICOAGULANT (COUMADIN) 2.5 MG tablet Take 2 tablets (5 mg) to 3 tablets (7.5 mg) by mouth daily or as directed by Anticoagulation Clinic. 90 tablet 5     warfarin ANTICOAGULANT (COUMADIN) 2.5 MG tablet Take 2 tabs (5mg) daily at bedtime. Future dose adjustments pending INR (Patient taking differently: Take 7.5 mg by mouth every evening) 72 tablet 0     zinc sulfate (ZINCATE) 220 (50 Zn) MG capsule Take 1 capsule (220 mg) by mouth daily for 12 doses 12 capsule 0       ROS:   10-point ROS negative except for H&P    EXAM:  BP (!) 80/0 (BP  "Location: Right arm, Patient Position: Chair, Cuff Size: Adult Small)   Pulse 65   Ht 1.674 m (5' 5.91\")   Wt 64.7 kg (142 lb 9.6 oz)   SpO2 90%   BMI 23.08 kg/m      General: appears comfortable, alert and articulate  Heart: LVAD hum, JVP 7 cm  Lungs: clear, no rales or wheezing  Abdomen: soft  Extremities: no edema  Neurological: normal speech and affect, no gross motor deficits    Weight  Wt Readings from Last 10 Encounters:   10/27/22 64.7 kg (142 lb 9.6 oz)   10/25/22 65.8 kg (145 lb)   10/18/22 60.8 kg (134 lb 1.6 oz)   10/07/22 63.6 kg (140 lb 3.2 oz)   09/29/22 60.8 kg (134 lb 0.6 oz)   09/15/22 61.2 kg (134 lb 14.7 oz)   09/04/22 59.1 kg (130 lb 6.4 oz)   08/21/22 59.3 kg (130 lb 12.8 oz)   07/08/22 62.6 kg (138 lb)   06/30/22 65.8 kg (145 lb)       Labs:    CBC RESULTS:  Lab Results   Component Value Date    WBC 8.3 10/26/2022    RBC 3.34 (L) 10/26/2022    HGB 9.7 (L) 10/26/2022    HCT 30.9 (L) 10/26/2022    MCV 93 10/26/2022    MCH 29.0 10/26/2022    MCHC 31.4 (L) 10/26/2022    RDW 15.3 (H) 10/26/2022     10/26/2022       CMP RESULTS:  Lab Results   Component Value Date     10/26/2022    POTASSIUM 4.5 10/26/2022    POTASSIUM 3.9 09/01/2022    POTASSIUM 5.2 07/09/2022    CHLORIDE 103 10/26/2022    CHLORIDE 112 (H) 09/01/2022    CO2 24 10/26/2022    CO2 22 09/01/2022    ANIONGAP 12 10/26/2022    ANIONGAP 6 09/01/2022    GLC 95 10/26/2022    GLC 94 09/01/2022    BUN 12.1 10/26/2022    BUN 13 09/01/2022    CR 0.75 10/26/2022    GFRESTIMATED >90 10/26/2022    ELDA 9.5 10/26/2022    BILITOTAL 0.4 10/26/2022    ALBUMIN 3.6 10/26/2022    ALBUMIN 3.1 (L) 09/01/2022    ALKPHOS 109 10/26/2022    ALT 7 (L) 10/26/2022    AST 20 10/26/2022        Testing/Procedures:  I personally visualized and interpreted:    TTE 9/23/22:  Interpretation Summary  HM3 LVAD is present but not well seen on today's study.     Left ventricular chamber size is small, LVIDd = 3.9 cm  Left ventricular function is decreased. " The ejection fraction is 15-20% (severely reduced).  Global right ventricular function is normal.  The aortic valve remains closed during the cardiac cycle. There is mild continuous aortic regurgitation.  No pericardial effusion is present.     LV cavity size is smaller with cavity obliteration in standing position LVIDd = 3.0 cm    EKG 9/20/22:  Sinus with PVCs, RBBB    RHC 9/23/22:  RA 10/11/8 mmHg  RV 19/8 mmHg  PA 18/10/13 mmHg  CWP 5/4/4 mmHg  CO (Marley) 3.7 L/min  CI (Marley) 2.2 L/min/m2  CO (TD) 3.27 L/min  CI (TD) 1.94 L/min/m2    MAP: 62  SVR: 1321 (using TD CO)  PVR 2.8 KNOWLES (using TD CO)    Due to low filling pressures and complaints of low flow alarms patient was given 1 Liter of saline bolus to reassess filling pressures.    RA 17/17/13 mmHg  RV 21/10 mmHg  PA 21/13/16 mmHg  CWP 11/10/9 mmHg  CO (Marley) 3.4 L/min  CI (Marley) 2.01 L/min/m2    MAP 62 mmHg   SVR 1153 dynes (using Marley CO)  PVR 2.1 KNOWLES (using Marley CO)     Assessment and Plan:   In summary, Mr. Dandy Sands is a pleasant 61 year old male with a past medical history of SLE, antiphospholipid syndrome, pulmonary embolism (on warfarin), ICM (EF 10-15%) s/p HM3 LVAD (7/28/22), and C. Diff who presents to clinic today for follow up.    Chronic systolic heart failure/HFrEF (EF 10-15%) secondary to ischemic cardiomyopathy  NYHA Symptom Class II  Stage D  ACE-I/ARB/ARNi: Lisinopril 10 mg daily  He is on hydralazine 25mg TID, eventual transition completely to lisinopril  Not on beta-blocker due to severe RV failure, hypotension, low flows  Aldosterone antagonist has not been started yet, due to low flows  SGLT2i: None due to recurrent hypotension, hypovolemia, low flows  SCD prophylaxis is an ICD  Fluid status euvolemic currently  Diuretic: None  Cardiac Rehab: He has been doing  has low flow controller    Potential driveline infection, abdominal pain with pus drainage.  Start doxycycline, wound cultures, CT scan    LVAD speed 5100, no speed drops, PI's  ranged from 3-5, flows generally in the 3-3.5 range  Warfarin to maintain INR goal of 2-3  Currently off aspirin due to recurrent epistaxis    Perform ICD device interrogation today.    #SLE  #antiphospholipid syndrome  #pulmonary embolism:  - Continues on warfarin  -Off CellCept after seeing rheumatology, continues on Plaquenil    #c diff  #diarrhea:  - Improved; completed vanc, has follow-up with infectious disease.  May need to restart prophylactic vancomycin if he is on doxycycline for driveline infection    Follow-up next week with nurse practitioner, myself in a few weeks if he is still here.  We had a long discussion.  His son and his wife no longer can stay in the [facility] past November 7.  Patient's daughter recently had a child and is unable to come.  Technically patient will be 30 days post discharge from his index surgical admission, but has had multiple infectious complications since, now with a potential driveline infection, with recurrent low flow issues and had to get him a low flow controller.  If he looks good next week perhaps he can go back home but I think he has had elevated risk for complication right now.    The patient states understanding and is agreeable with plan.   Feel free to contact myself regarding questions or concerns.  It was a pleasure to see this patient today.    Kelly Lora MD   of Medicine  Advanced Heart Failure and Transplant Cardiology

## 2022-10-27 NOTE — PROGRESS NOTES
Patient/caregiver seen in clinic to check in 4 weeks post discharge. VAD and vitals assessed in clinic appt. Questions and concerns addressed during appointment.

## 2022-10-27 NOTE — NURSING NOTE
Chief Complaint   Patient presents with     Follow Up     Return VAD           Vitals were taken and medications reconciled.     Fam Lanier, EMT   8:02 AM

## 2022-10-27 NOTE — LETTER
10/27/2022      RE: Dandy Sands  [address]  [address] [address]       Dear Colleague,    Thank you for the opportunity to participate in the care of your patient, Dandy Sands, at the [facility] [place] at [facility]. Please see a copy of my visit note below.    Cardiology Clinic Note    HPI  Mr. Dandy Sands is a pleasant 61 year old male with a past medical history of SLE, antiphospholipid syndrome, pulmonary embolism (on warfarin), ICM (EF 10-15%) s/p HM3 LVAD (7/28/22), and C. Diff who presents to clinic today for follow up.    I first met him May 2022.  He has brought in for consideration of elevated risk bypass surgery, however we elected to do to a multivessel PCI.  He subsequently returned a month later in cardiogenic shock from [place], with LFTs over thousand.  June 2022 we performed a evaluation for LVAD and transplant, and we had him at one point listed for transplant, however due to his elevated antibody levels associated with his lupus, we are unable to get a adequate donor offer, thus we proceeded with LVAD therapy as probable destination given his amount of antibodies.  His LVAD surgery was complicated by severe right ventricular failure requiring temporary RVAD.  He also had severe delirium and at one point tearing out his bridled nasogastric tube, causing a nasal septal perforation and severe nosebleeding.  He was discharged to rehab and then we presented with severe C. difficile diarrhea, dehydration, low flow alarms, as well as COVID-19 infection.  I had seen him one time in clinic 10/7/22, but he has had several admissions.    He presents with his son.  [facility]  He will still have occasional lightheaded and dizziness upon standing.  He no longer is having low flow alarms, we did get him a low flow controller as his echo speed optimization and right heart  catheterization still showed low PCWP and low LVIDD..  Speed is set to 5100 rpm but no longer having speed drops.  He did have an episode of epistaxis when his INR get to 2.8.  He otherwise denies any chest pain or shortness of breath.  He does not have any lower extremity edema.    He has noted increased abdominal discomfort, and nursing bring pus coming out of his driveline.  He also noted that his ICD is beeping.    Cardiac Medications:  - Hydralazine 25 mg TID  - Warfarin, no aspirin  - Lisinopril 10 mg daily  - Digoxin 125 mcg every day  - Atorvastatin 40 mg nightly    PAST MEDICAL HISTORY:  Patient Active Problem List   Diagnosis     Decompensated heart failure (H)     Cardiogenic shock (H)     LVAD (left ventricular assist device) present (H)     Chronic systolic congestive heart failure (H)     Heart failure with reduced ejection fraction, NYHA class III (H)     Lightheadedness     Recurrent epistaxis     Epistaxis     Complication involving left ventricular assist device (LVAD)     Acute diarrhea     C. difficile diarrhea     Left ventricular assist device present (H)     Chills     COVID     Nausea and vomiting, unspecified vomiting type        FAMILY HISTORY:  Father with hypertension, mother with coronary disease requiring a 5 vessel CABG, sister with alcohol and drug use    SOCIAL HISTORY:  Former smoker, quit in 2009 after 25 pack years  Very rare alcohol use    ALLERGIES:  No Known Allergies    CURRENT MEDICATIONS:  Current Outpatient Medications   Medication Sig Dispense Refill     acetaminophen (TYLENOL) 325 MG tablet Take 3 tablets (975 mg) by mouth every 8 hours as needed for mild pain 270 tablet 0     aspirin (ASA) 81 MG chewable tablet Take 1 tablet (81 mg) by mouth daily (Patient not taking: Reported on 10/25/2022) 30 tablet 0     atorvastatin (LIPITOR) 40 MG tablet Take 1 tablet (40 mg) by mouth daily 30 tablet 0     digoxin (LANOXIN) 125 MCG tablet Take 1 tablet (125 mcg) by mouth daily 30  tablet 0     doxycycline hyclate (VIBRAMYCIN) 100 MG capsule Take 1 capsule (100 mg) by mouth 2 times daily 14 capsule 0     hydrALAZINE (APRESOLINE) 25 MG tablet Take 1 tablet (25 mg) by mouth 3 times daily 90 tablet 0     hydrocortisone 1 % CREA cream Place 1 g rectally daily as needed for itching 1 g 0     hydroxychloroquine (PLAQUENIL) 200 MG tablet Take 1 tablet (200 mg) by mouth 2 times daily 60 tablet 4     lisinopril (ZESTRIL) 10 MG tablet Take 1 tablet (10 mg) by mouth daily 30 tablet 0     loperamide (IMODIUM) 2 MG capsule Take 1 capsule (2 mg) by mouth 2 times daily as needed for diarrhea 30 capsule 0     melatonin 5 MG tablet Take 1 tablet (5 mg) by mouth At Bedtime 30 tablet 0     methylcellulose (CITRUCEL) 500 MG TABS tablet Take 2 tablets (1,000 mg) by mouth 2 times daily 60 tablet 0     multivitamin w/minerals (THERA-VIT-M) tablet Take 1 tablet by mouth daily  0     pramox-pe-glycerin-petrolatum (PREPARATION H) 1-0.25-14.4-15 % CREA cream Place rectally 2 times daily as needed for hemorrhoids Apply immediately after a bowel movement. 51 g 0     promethazine (PHENERGAN) 12.5 MG tablet Take 1 tablet (12.5 mg) by mouth every 6 hours as needed for nausea or vomiting 30 tablet 1     saline nasal (AYR SALINE) GEL topical gel Apply 1 applicator into each nare nightly as needed for congestion       sertraline (ZOLOFT) 25 MG tablet Take 3 tablets (75 mg) by mouth daily (Patient not taking: Reported on 10/25/2022) 30 tablet 0     sodium chloride (OCEAN) 0.65 % nasal spray Spray 2 sprays into both nostrils every 4 hours as needed for congestion       tamsulosin (FLOMAX) 0.4 MG capsule Take 1 capsule (0.4 mg) by mouth daily 30 capsule 0     thiamine (B-1) 100 MG tablet Take 1 tablet (100 mg) by mouth daily 30 tablet 0     warfarin ANTICOAGULANT (COUMADIN) 2.5 MG tablet Take 2 tablets (5 mg) to 3 tablets (7.5 mg) by mouth daily or as directed by Anticoagulation Clinic. 90 tablet 5     warfarin ANTICOAGULANT  "(COUMADIN) 2.5 MG tablet Take 2 tabs (5mg) daily at bedtime. Future dose adjustments pending INR (Patient taking differently: Take 7.5 mg by mouth every evening) 72 tablet 0     zinc sulfate (ZINCATE) 220 (50 Zn) MG capsule Take 1 capsule (220 mg) by mouth daily for 12 doses 12 capsule 0       ROS:   10-point ROS negative except for H&P    EXAM:  BP (!) 80/0 (BP Location: Right arm, Patient Position: Chair, Cuff Size: Adult Small)   Pulse 65   Ht 1.674 m (5' 5.91\")   Wt 64.7 kg (142 lb 9.6 oz)   SpO2 90%   BMI 23.08 kg/m      General: appears comfortable, alert and articulate  Heart: LVAD hum, JVP 7 cm  Lungs: clear, no rales or wheezing  Abdomen: soft  Extremities: no edema  Neurological: normal speech and affect, no gross motor deficits    Weight  Wt Readings from Last 10 Encounters:   10/27/22 64.7 kg (142 lb 9.6 oz)   10/25/22 65.8 kg (145 lb)   10/18/22 60.8 kg (134 lb 1.6 oz)   10/07/22 63.6 kg (140 lb 3.2 oz)   09/29/22 60.8 kg (134 lb 0.6 oz)   09/15/22 61.2 kg (134 lb 14.7 oz)   09/04/22 59.1 kg (130 lb 6.4 oz)   08/21/22 59.3 kg (130 lb 12.8 oz)   07/08/22 62.6 kg (138 lb)   06/30/22 65.8 kg (145 lb)       Labs:    CBC RESULTS:  Lab Results   Component Value Date    WBC 8.3 10/26/2022    RBC 3.34 (L) 10/26/2022    HGB 9.7 (L) 10/26/2022    HCT 30.9 (L) 10/26/2022    MCV 93 10/26/2022    MCH 29.0 10/26/2022    MCHC 31.4 (L) 10/26/2022    RDW 15.3 (H) 10/26/2022     10/26/2022       CMP RESULTS:  Lab Results   Component Value Date     10/26/2022    POTASSIUM 4.5 10/26/2022    POTASSIUM 3.9 09/01/2022    POTASSIUM 5.2 07/09/2022    CHLORIDE 103 10/26/2022    CHLORIDE 112 (H) 09/01/2022    CO2 24 10/26/2022    CO2 22 09/01/2022    ANIONGAP 12 10/26/2022    ANIONGAP 6 09/01/2022    GLC 95 10/26/2022    GLC 94 09/01/2022    BUN 12.1 10/26/2022    BUN 13 09/01/2022    CR 0.75 10/26/2022    GFRESTIMATED >90 10/26/2022    ELDA 9.5 10/26/2022    BILITOTAL 0.4 10/26/2022    ALBUMIN 3.6 10/26/2022    " ALBUMIN 3.1 (L) 09/01/2022    ALKPHOS 109 10/26/2022    ALT 7 (L) 10/26/2022    AST 20 10/26/2022        Testing/Procedures:  I personally visualized and interpreted:    TTE 9/23/22:  Interpretation Summary  HM3 LVAD is present but not well seen on today's study.     Left ventricular chamber size is small, LVIDd = 3.9 cm  Left ventricular function is decreased. The ejection fraction is 15-20% (severely reduced).  Global right ventricular function is normal.  The aortic valve remains closed during the cardiac cycle. There is mild continuous aortic regurgitation.  No pericardial effusion is present.     LV cavity size is smaller with cavity obliteration in standing position LVIDd = 3.0 cm    EKG 9/20/22:  Sinus with PVCs, RBBB    RHC 9/23/22:  RA 10/11/8 mmHg  RV 19/8 mmHg  PA 18/10/13 mmHg  CWP 5/4/4 mmHg  CO (Marley) 3.7 L/min  CI (Marley) 2.2 L/min/m2  CO (TD) 3.27 L/min  CI (TD) 1.94 L/min/m2    MAP: 62  SVR: 1321 (using TD CO)  PVR 2.8 KNOWLES (using TD CO)    Due to low filling pressures and complaints of low flow alarms patient was given 1 Liter of saline bolus to reassess filling pressures.    RA 17/17/13 mmHg  RV 21/10 mmHg  PA 21/13/16 mmHg  CWP 11/10/9 mmHg  CO (Marley) 3.4 L/min  CI (Marley) 2.01 L/min/m2    MAP 62 mmHg   SVR 1153 dynes (using Marley CO)  PVR 2.1 KNOWLES (using Marley CO)     Assessment and Plan:   In summary, Mr. Dandy Sands is a pleasant 61 year old male with a past medical history of SLE, antiphospholipid syndrome, pulmonary embolism (on warfarin), ICM (EF 10-15%) s/p HM3 LVAD (7/28/22), and C. Diff who presents to clinic today for follow up.    Chronic systolic heart failure/HFrEF (EF 10-15%) secondary to ischemic cardiomyopathy  NYHA Symptom Class II  Stage D  ACE-I/ARB/ARNi: Lisinopril 10 mg daily  He is on hydralazine 25mg TID, eventual transition completely to lisinopril  Not on beta-blocker due to severe RV failure, hypotension, low flows  Aldosterone antagonist has not been started yet, due to low  flows  SGLT2i: None due to recurrent hypotension, hypovolemia, low flows  SCD prophylaxis is an ICD  Fluid status euvolemic currently  Diuretic: None  Cardiac Rehab: He has been doing  has low flow controller    Potential driveline infection, abdominal pain with pus drainage.  Start doxycycline, wound cultures, CT scan    LVAD speed 5100, no speed drops, PI's ranged from 3-5, flows generally in the 3-3.5 range  Warfarin to maintain INR goal of 2-3  Currently off aspirin due to recurrent epistaxis    Perform ICD device interrogation today.    #SLE  #antiphospholipid syndrome  #pulmonary embolism:  - Continues on warfarin  -Off CellCept after seeing rheumatology, continues on Plaquenil    #c diff  #diarrhea:  - Improved; completed vanc, has follow-up with infectious disease.  May need to restart prophylactic vancomycin if he is on doxycycline for driveline infection    Follow-up next week with nurse practitioner, myself in a few weeks if he is still here.  We had a long discussion.  His son and his wife no longer can stay in the [facility] past November 7.  Patient's daughter recently had a child and is unable to come.  Technically patient will be 30 days post discharge from his index surgical admission, but has had multiple infectious complications since, now with a potential driveline infection, with recurrent low flow issues and had to get him a low flow controller.  If he looks good next week perhaps he can go back home but I think he has had elevated risk for complication right now.    The patient states understanding and is agreeable with plan.   Feel free to contact myself regarding questions or concerns.  It was a pleasure to see this patient today.    Kelly Lora MD   of Medicine  Advanced Heart Failure and Transplant Cardiology

## 2022-10-27 NOTE — NURSING NOTE
MCS VAD Pump Info     Row Name 10/27/22 0918             MCS VAD Information    Implant LVAD      LVAD Pump HeartMate 3         Heartmate 3 LEFT VS    Flow (Lpm) 3.3 Lpm  LOW FLOW CONTROLLER      Pulse Index (PI) 6 PI      Speed (rpm) 5100 rpm      Power (persaud) 3.6 persaud      Current Hct setting 31      Retired: Unexpected Alarms --         Primary Controller    Serial number [id number]      Low flow alarm setting 2.0      High watt alarm setting --      EBB: Patient use 5      Replace in 29 Months         Backup Controller    Serial number [id number]      EBB: Patient use 8      Replace EBB in 29 Months      Speed & HCT match primary controller Y         VAD Interrogation    Alarms reported by patient N      Unexpected alarms noted upon interrogation None      PI events Occasional      Damage to equipment is noted N      Action taken Reviewed proper equipment care and maintenance         Driveline Exit Site    Dressing change done Y      Driveline properly secured Yes      DLES assessment drainage  greenish. Cultures sent 10/26      Dressing used Daily kit      Frequency patient changes dressing Daily      Dressing modifications --      Dressing change supplier --                    4)  Education Complete: Yes   Charge the BACKUP controller s backup battery every 6 months  Perform a self test on BACKUP every 6 months  Change the MPU s batteries every 6 months:Yes  Have equipment serviced yearly (if applicable):Not today.    For the daily dsng: used no adhesive remover or skin prep. Used medipore tape instead of he outer covering of the daily kit.

## 2022-10-27 NOTE — PATIENT INSTRUCTIONS
It was a pleasure to see you in clinic today.  Please do not hesitate to call with any questions or concerns. You will be scheduled for a remote transmission every 3 months, which will take place automatically. Your next remote transmission is scheduled for 3 months from now. We will begin the process of getting your remote monitoring transferred from your original device clinic to the [facility]. We look forward to seeing you in clinic at your next device check on 1/12/23.    ROBERT LeungN, RN  Electrophysiology Nurse Clinician  [facility]    During Business Hours Please Call:  [phone]  After Hours Please Call:  [phone] - select option #4 and ask for job code [phone]

## 2022-10-28 LAB
ALBUMIN SERPL BCG-MCNC: 3.2 G/DL (ref 3.5–5.2)
ALBUMIN SERPL BCG-MCNC: 3.6 G/DL (ref 3.5–5.2)
ALBUMIN UR-MCNC: 20 MG/DL
ALP SERPL-CCNC: 108 U/L (ref 40–129)
ALP SERPL-CCNC: 124 U/L (ref 40–129)
ALT SERPL W P-5'-P-CCNC: 10 U/L (ref 10–50)
ALT SERPL W P-5'-P-CCNC: 11 U/L (ref 10–50)
ANION GAP SERPL CALCULATED.3IONS-SCNC: 13 MMOL/L (ref 7–15)
ANION GAP SERPL CALCULATED.3IONS-SCNC: 13 MMOL/L (ref 7–15)
APPEARANCE UR: CLEAR
AST SERPL W P-5'-P-CCNC: 21 U/L (ref 10–50)
AST SERPL W P-5'-P-CCNC: 26 U/L (ref 10–50)
BASOPHILS # BLD AUTO: 0.1 10E3/UL (ref 0–0.2)
BASOPHILS NFR BLD AUTO: 0 %
BILIRUB SERPL-MCNC: 0.4 MG/DL
BILIRUB SERPL-MCNC: 0.7 MG/DL
BILIRUB UR QL STRIP: NEGATIVE
BUN SERPL-MCNC: 11.1 MG/DL (ref 8–23)
BUN SERPL-MCNC: 13.9 MG/DL (ref 8–23)
CALCIUM SERPL-MCNC: 8.9 MG/DL (ref 8.8–10.2)
CALCIUM SERPL-MCNC: 9.2 MG/DL (ref 8.8–10.2)
CHLORIDE SERPL-SCNC: 100 MMOL/L (ref 98–107)
CHLORIDE SERPL-SCNC: 101 MMOL/L (ref 98–107)
COLOR UR AUTO: ABNORMAL
CREAT SERPL-MCNC: 0.72 MG/DL (ref 0.67–1.17)
CREAT SERPL-MCNC: 0.76 MG/DL (ref 0.67–1.17)
CRP SERPL-MCNC: 111 MG/L
CRP SERPL-MCNC: 86.7 MG/L
DEPRECATED HCO3 PLAS-SCNC: 19 MMOL/L (ref 22–29)
DEPRECATED HCO3 PLAS-SCNC: 20 MMOL/L (ref 22–29)
DIGOXIN SERPL-MCNC: 0.7 NG/ML (ref 0.6–2)
EOSINOPHIL # BLD AUTO: 0 10E3/UL (ref 0–0.7)
EOSINOPHIL NFR BLD AUTO: 0 %
ERYTHROCYTE [DISTWIDTH] IN BLOOD BY AUTOMATED COUNT: 15.7 % (ref 10–15)
GFR SERPL CREATININE-BSD FRML MDRD: >90 ML/MIN/1.73M2
GFR SERPL CREATININE-BSD FRML MDRD: >90 ML/MIN/1.73M2
GLUCOSE SERPL-MCNC: 100 MG/DL (ref 70–99)
GLUCOSE SERPL-MCNC: 88 MG/DL (ref 70–99)
GLUCOSE UR STRIP-MCNC: NEGATIVE MG/DL
HCT VFR BLD AUTO: 30.8 % (ref 40–53)
HGB BLD-MCNC: 9.3 G/DL (ref 13.3–17.7)
HGB UR QL STRIP: NEGATIVE
HOLD SPECIMEN: NORMAL
IMM GRANULOCYTES # BLD: 0.1 10E3/UL
IMM GRANULOCYTES NFR BLD: 1 %
INR PPP: 2.3 (ref 0.85–1.15)
KETONES UR STRIP-MCNC: NEGATIVE MG/DL
LACTATE SERPL-SCNC: 1.6 MMOL/L (ref 0.7–2)
LDH SERPL L TO P-CCNC: 260 U/L (ref 0–250)
LDH SERPL L TO P-CCNC: 308 U/L (ref 0–250)
LEUKOCYTE ESTERASE UR QL STRIP: NEGATIVE
LYMPHOCYTES # BLD AUTO: 1.5 10E3/UL (ref 0.8–5.3)
LYMPHOCYTES NFR BLD AUTO: 10 %
MAGNESIUM SERPL-MCNC: 1.5 MG/DL (ref 1.7–2.3)
MAGNESIUM SERPL-MCNC: 1.5 MG/DL (ref 1.7–2.3)
MCH RBC QN AUTO: 28.4 PG (ref 26.5–33)
MCHC RBC AUTO-ENTMCNC: 30.2 G/DL (ref 31.5–36.5)
MCV RBC AUTO: 94 FL (ref 78–100)
MONOCYTES # BLD AUTO: 1.2 10E3/UL (ref 0–1.3)
MONOCYTES NFR BLD AUTO: 8 %
MRSA DNA SPEC QL NAA+PROBE: NEGATIVE
NEUTROPHILS # BLD AUTO: 12.5 10E3/UL (ref 1.6–8.3)
NEUTROPHILS NFR BLD AUTO: 81 %
NITRATE UR QL: NEGATIVE
NRBC # BLD AUTO: 0 10E3/UL
NRBC BLD AUTO-RTO: 0 /100
PH UR STRIP: 6 [PH] (ref 5–7)
PLATELET # BLD AUTO: 264 10E3/UL (ref 150–450)
POTASSIUM SERPL-SCNC: 4.2 MMOL/L (ref 3.4–5.3)
POTASSIUM SERPL-SCNC: 4.6 MMOL/L (ref 3.4–5.3)
PROCALCITONIN SERPL IA-MCNC: 0.07 NG/ML
PROT SERPL-MCNC: 7.1 G/DL (ref 6.4–8.3)
PROT SERPL-MCNC: 7.5 G/DL (ref 6.4–8.3)
RBC # BLD AUTO: 3.27 10E6/UL (ref 4.4–5.9)
RBC URINE: 2 /HPF
SA TARGET DNA: NEGATIVE
SODIUM SERPL-SCNC: 132 MMOL/L (ref 136–145)
SODIUM SERPL-SCNC: 134 MMOL/L (ref 136–145)
SP GR UR STRIP: 1.02 (ref 1–1.03)
TROPONIN T SERPL HS-MCNC: 29 NG/L
UROBILINOGEN UR STRIP-MCNC: NORMAL MG/DL
WBC # BLD AUTO: 15.3 10E3/UL (ref 4–11)
WBC URINE: 1 /HPF

## 2022-10-28 PROCEDURE — 99207 PR SC NO CHARGE VISIT: CPT | Performed by: INTERNAL MEDICINE

## 2022-10-28 PROCEDURE — 85610 PROTHROMBIN TIME: CPT

## 2022-10-28 PROCEDURE — 87086 URINE CULTURE/COLONY COUNT: CPT | Performed by: STUDENT IN AN ORGANIZED HEALTH CARE EDUCATION/TRAINING PROGRAM

## 2022-10-28 PROCEDURE — 87641 MR-STAPH DNA AMP PROBE: CPT | Performed by: STUDENT IN AN ORGANIZED HEALTH CARE EDUCATION/TRAINING PROGRAM

## 2022-10-28 PROCEDURE — 999N000128 HC STATISTIC PERIPHERAL IV START W/O US GUIDANCE

## 2022-10-28 PROCEDURE — 85025 COMPLETE CBC W/AUTO DIFF WBC: CPT | Performed by: STUDENT IN AN ORGANIZED HEALTH CARE EDUCATION/TRAINING PROGRAM

## 2022-10-28 PROCEDURE — 250N000011 HC RX IP 250 OP 636: Performed by: STUDENT IN AN ORGANIZED HEALTH CARE EDUCATION/TRAINING PROGRAM

## 2022-10-28 PROCEDURE — 99222 1ST HOSP IP/OBS MODERATE 55: CPT | Performed by: SURGERY

## 2022-10-28 PROCEDURE — 83615 LACTATE (LD) (LDH) ENZYME: CPT | Performed by: STUDENT IN AN ORGANIZED HEALTH CARE EDUCATION/TRAINING PROGRAM

## 2022-10-28 PROCEDURE — 80053 COMPREHEN METABOLIC PANEL: CPT | Performed by: STUDENT IN AN ORGANIZED HEALTH CARE EDUCATION/TRAINING PROGRAM

## 2022-10-28 PROCEDURE — 93005 ELECTROCARDIOGRAM TRACING: CPT | Performed by: EMERGENCY MEDICINE

## 2022-10-28 PROCEDURE — 250N000013 HC RX MED GY IP 250 OP 250 PS 637: Performed by: INTERNAL MEDICINE

## 2022-10-28 PROCEDURE — 96376 TX/PRO/DX INJ SAME DRUG ADON: CPT | Performed by: EMERGENCY MEDICINE

## 2022-10-28 PROCEDURE — 96365 THER/PROPH/DIAG IV INF INIT: CPT | Mod: 59 | Performed by: EMERGENCY MEDICINE

## 2022-10-28 PROCEDURE — 250N000013 HC RX MED GY IP 250 OP 250 PS 637

## 2022-10-28 PROCEDURE — 96366 THER/PROPH/DIAG IV INF ADDON: CPT | Performed by: EMERGENCY MEDICINE

## 2022-10-28 PROCEDURE — 250N000011 HC RX IP 250 OP 636

## 2022-10-28 PROCEDURE — 83735 ASSAY OF MAGNESIUM: CPT | Performed by: STUDENT IN AN ORGANIZED HEALTH CARE EDUCATION/TRAINING PROGRAM

## 2022-10-28 PROCEDURE — 96361 HYDRATE IV INFUSION ADD-ON: CPT | Performed by: EMERGENCY MEDICINE

## 2022-10-28 PROCEDURE — 258N000003 HC RX IP 258 OP 636

## 2022-10-28 PROCEDURE — 87070 CULTURE OTHR SPECIMN AEROBIC: CPT | Performed by: PHYSICIAN ASSISTANT

## 2022-10-28 PROCEDURE — 36415 COLL VENOUS BLD VENIPUNCTURE: CPT

## 2022-10-28 PROCEDURE — 86140 C-REACTIVE PROTEIN: CPT | Performed by: STUDENT IN AN ORGANIZED HEALTH CARE EDUCATION/TRAINING PROGRAM

## 2022-10-28 PROCEDURE — 96375 TX/PRO/DX INJ NEW DRUG ADDON: CPT | Performed by: EMERGENCY MEDICINE

## 2022-10-28 PROCEDURE — 99223 1ST HOSP IP/OBS HIGH 75: CPT | Performed by: PHYSICIAN ASSISTANT

## 2022-10-28 PROCEDURE — 96368 THER/DIAG CONCURRENT INF: CPT | Performed by: EMERGENCY MEDICINE

## 2022-10-28 PROCEDURE — 99232 SBSQ HOSP IP/OBS MODERATE 35: CPT | Mod: GC | Performed by: INTERNAL MEDICINE

## 2022-10-28 PROCEDURE — 81001 URINALYSIS AUTO W/SCOPE: CPT | Performed by: STUDENT IN AN ORGANIZED HEALTH CARE EDUCATION/TRAINING PROGRAM

## 2022-10-28 PROCEDURE — 36415 COLL VENOUS BLD VENIPUNCTURE: CPT | Performed by: STUDENT IN AN ORGANIZED HEALTH CARE EDUCATION/TRAINING PROGRAM

## 2022-10-28 PROCEDURE — 96367 TX/PROPH/DG ADDL SEQ IV INF: CPT | Performed by: EMERGENCY MEDICINE

## 2022-10-28 PROCEDURE — 250N000011 HC RX IP 250 OP 636: Performed by: INTERNAL MEDICINE

## 2022-10-28 PROCEDURE — 83605 ASSAY OF LACTIC ACID: CPT | Performed by: INTERNAL MEDICINE

## 2022-10-28 PROCEDURE — 84145 PROCALCITONIN (PCT): CPT

## 2022-10-28 PROCEDURE — 214N000001 HC R&B CCU UMMC

## 2022-10-28 PROCEDURE — 80162 ASSAY OF DIGOXIN TOTAL: CPT

## 2022-10-28 PROCEDURE — 36415 COLL VENOUS BLD VENIPUNCTURE: CPT | Performed by: INTERNAL MEDICINE

## 2022-10-28 RX ORDER — ACETAMINOPHEN 650 MG/1
650 SUPPOSITORY RECTAL EVERY 4 HOURS PRN
Status: DISCONTINUED | OUTPATIENT
Start: 2022-10-28 | End: 2022-11-03 | Stop reason: HOSPADM

## 2022-10-28 RX ORDER — HYDROXYCHLOROQUINE SULFATE 200 MG/1
200 TABLET, FILM COATED ORAL 2 TIMES DAILY
Status: DISCONTINUED | OUTPATIENT
Start: 2022-10-28 | End: 2022-11-03 | Stop reason: HOSPADM

## 2022-10-28 RX ORDER — FAMOTIDINE 20 MG/1
20 TABLET, FILM COATED ORAL 2 TIMES DAILY
Status: DISCONTINUED | OUTPATIENT
Start: 2022-10-28 | End: 2022-11-03 | Stop reason: HOSPADM

## 2022-10-28 RX ORDER — DOXYCYCLINE 100 MG/1
100 CAPSULE ORAL EVERY 12 HOURS SCHEDULED
Status: DISCONTINUED | OUTPATIENT
Start: 2022-10-28 | End: 2022-10-28

## 2022-10-28 RX ORDER — LIDOCAINE 40 MG/G
CREAM TOPICAL
Status: DISCONTINUED | OUTPATIENT
Start: 2022-10-28 | End: 2022-11-03 | Stop reason: HOSPADM

## 2022-10-28 RX ORDER — VANCOMYCIN HYDROCHLORIDE 1 G/200ML
1000 INJECTION, SOLUTION INTRAVENOUS EVERY 12 HOURS
Status: DISCONTINUED | OUTPATIENT
Start: 2022-10-28 | End: 2022-10-29

## 2022-10-28 RX ORDER — PIPERACILLIN SODIUM, TAZOBACTAM SODIUM 3; .375 G/15ML; G/15ML
3.38 INJECTION, POWDER, LYOPHILIZED, FOR SOLUTION INTRAVENOUS EVERY 6 HOURS
Status: DISCONTINUED | OUTPATIENT
Start: 2022-10-28 | End: 2022-10-28

## 2022-10-28 RX ORDER — ONDANSETRON 2 MG/ML
4 INJECTION INTRAMUSCULAR; INTRAVENOUS EVERY 6 HOURS PRN
Status: DISCONTINUED | OUTPATIENT
Start: 2022-10-28 | End: 2022-10-29

## 2022-10-28 RX ORDER — ASPIRIN 81 MG/1
81 TABLET, CHEWABLE ORAL DAILY
Status: DISCONTINUED | OUTPATIENT
Start: 2022-10-28 | End: 2022-11-01

## 2022-10-28 RX ORDER — MAGNESIUM HYDROXIDE/ALUMINUM HYDROXICE/SIMETHICONE 120; 1200; 1200 MG/30ML; MG/30ML; MG/30ML
30 SUSPENSION ORAL EVERY 4 HOURS PRN
Status: DISCONTINUED | OUTPATIENT
Start: 2022-10-28 | End: 2022-11-03 | Stop reason: HOSPADM

## 2022-10-28 RX ORDER — TAMSULOSIN HYDROCHLORIDE 0.4 MG/1
0.4 CAPSULE ORAL DAILY
Status: DISCONTINUED | OUTPATIENT
Start: 2022-10-28 | End: 2022-11-03 | Stop reason: HOSPADM

## 2022-10-28 RX ORDER — ATORVASTATIN CALCIUM 40 MG/1
40 TABLET, FILM COATED ORAL EVERY EVENING
Status: DISCONTINUED | OUTPATIENT
Start: 2022-10-28 | End: 2022-11-03 | Stop reason: HOSPADM

## 2022-10-28 RX ORDER — PIPERACILLIN SODIUM, TAZOBACTAM SODIUM 4; .5 G/20ML; G/20ML
4.5 INJECTION, POWDER, LYOPHILIZED, FOR SOLUTION INTRAVENOUS EVERY 6 HOURS
Status: DISCONTINUED | OUTPATIENT
Start: 2022-10-28 | End: 2022-10-30

## 2022-10-28 RX ORDER — WARFARIN SODIUM 5 MG/1
5 TABLET ORAL
Status: COMPLETED | OUTPATIENT
Start: 2022-10-28 | End: 2022-10-28

## 2022-10-28 RX ORDER — DIGOXIN 125 MCG
125 TABLET ORAL DAILY
Status: DISCONTINUED | OUTPATIENT
Start: 2022-10-28 | End: 2022-11-03 | Stop reason: HOSPADM

## 2022-10-28 RX ORDER — WARFARIN SODIUM 5 MG/1
5 TABLET ORAL ONCE
Status: COMPLETED | OUTPATIENT
Start: 2022-10-28 | End: 2022-10-28

## 2022-10-28 RX ORDER — ACETAMINOPHEN 325 MG/1
650 TABLET ORAL EVERY 4 HOURS PRN
Status: DISCONTINUED | OUTPATIENT
Start: 2022-10-28 | End: 2022-11-03 | Stop reason: HOSPADM

## 2022-10-28 RX ADMIN — FAMOTIDINE 20 MG: 20 TABLET ORAL at 09:29

## 2022-10-28 RX ADMIN — ACETAMINOPHEN 650 MG: 325 TABLET, FILM COATED ORAL at 15:32

## 2022-10-28 RX ADMIN — ACETAMINOPHEN 650 MG: 325 TABLET, FILM COATED ORAL at 00:11

## 2022-10-28 RX ADMIN — WARFARIN SODIUM 5 MG: 5 TABLET ORAL at 18:49

## 2022-10-28 RX ADMIN — PIPERACILLIN SODIUM AND TAZOBACTAM SODIUM 4.5 G: 4; .5 INJECTION, POWDER, LYOPHILIZED, FOR SOLUTION INTRAVENOUS at 21:46

## 2022-10-28 RX ADMIN — FAMOTIDINE 20 MG: 20 TABLET ORAL at 19:45

## 2022-10-28 RX ADMIN — ONDANSETRON 4 MG: 2 INJECTION INTRAMUSCULAR; INTRAVENOUS at 06:57

## 2022-10-28 RX ADMIN — ONDANSETRON 4 MG: 2 INJECTION INTRAMUSCULAR; INTRAVENOUS at 15:54

## 2022-10-28 RX ADMIN — HYDROXYCHLOROQUINE SULFATE 200 MG: 200 TABLET, FILM COATED ORAL at 09:29

## 2022-10-28 RX ADMIN — VANCOMYCIN HYDROCHLORIDE 1000 MG: 1 INJECTION, SOLUTION INTRAVENOUS at 23:09

## 2022-10-28 RX ADMIN — DIGOXIN 125 MCG: 125 TABLET ORAL at 09:29

## 2022-10-28 RX ADMIN — WARFARIN SODIUM 5 MG: 5 TABLET ORAL at 04:41

## 2022-10-28 RX ADMIN — ATORVASTATIN CALCIUM 40 MG: 40 TABLET, FILM COATED ORAL at 19:45

## 2022-10-28 RX ADMIN — PIPERACILLIN AND TAZOBACTAM 3.38 G: 3; .375 INJECTION, POWDER, LYOPHILIZED, FOR SOLUTION INTRAVENOUS at 12:50

## 2022-10-28 RX ADMIN — TAMSULOSIN HYDROCHLORIDE 0.4 MG: 0.4 CAPSULE ORAL at 09:28

## 2022-10-28 RX ADMIN — HYDROXYCHLOROQUINE SULFATE 200 MG: 200 TABLET, FILM COATED ORAL at 19:45

## 2022-10-28 RX ADMIN — PIPERACILLIN AND TAZOBACTAM 3.38 G: 3; .375 INJECTION, POWDER, LYOPHILIZED, FOR SOLUTION INTRAVENOUS at 16:02

## 2022-10-28 RX ADMIN — SODIUM CHLORIDE 500 ML: 9 INJECTION, SOLUTION INTRAVENOUS at 00:13

## 2022-10-28 RX ADMIN — VANCOMYCIN HYDROCHLORIDE 1000 MG: 1 INJECTION, SOLUTION INTRAVENOUS at 13:28

## 2022-10-28 ASSESSMENT — ACTIVITIES OF DAILY LIVING (ADL)
ADLS_ACUITY_SCORE: 41

## 2022-10-28 NOTE — CONSULTS
"  [facility] CONSULTATION     Patient:  Dandy Sands   Date of birth 1960, Medical record number [id number]  Date of Visit:  10/28/2022  Date of Admission: 10/27/2022  Consult Requester:Yoli Askew, *          Assessment and Recommendations:   ASSESSMENT:  1. 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 Anterior mediastinum fluid collection (decreasing since 8/20/22), possible loculated hematoma  3. Fever with rigors  4. Diarrhea acute on chronic  5. HFrEF due to ICM, s/p LVAD implantation (7/8/22)    DISCUSSION:   Dandy Sands is a 62 year old male with history of SLE on hydroxychloroquine, antiphospholipid ab syndrome on warfarin, CAD, HFrEF due to ICM, s/p LVAD implantation (7/8/22), recent COVID-19 infection (10/13/22), C.diff (9/7/22), chronic medication-related diarrhea who presented to ED on 10/27/22 with nausea, vomiting, and purulent drainage from LVAD driveline. Drainge noted ~10/25, culture was collected 10/26 patient was started on doxycycline. Presented to ED 10/27 after developing fever, now with elevated CRP and WBC. CT (10/27) with \"trace encapsulated fluid about the driveline\" and mediastinal fluid collection that is slowly decreasing in size since 8/20/22 and favored to represent loculated hematoma. Fever with rigors concerning for possible bacteremia, blood cultures are pending. Has been started on vancomycin and zosyn- agree with continuing for now.     Cough- for several days, occasional green sputum production. CXR and CT Chest not c/w pneumonia. Recent hospitalization for COVID-19 (10/13/22).    Diarrhea- chronic diarrhea at baseline, thought to be medication related. Had C.diff in September 2022, treated with PO vancomycin. Admitted at end of course as diarrhea had not resolved, suspicion for infectious diarrhea low at that time. Monitor.     RECOMMENDATION:  1. Continue Vancomycin for Corynebacterium " "striatum  2. Continue Zosyn for now   - Can stop in 24-48 hours if no planned procedures, blood cultures remain negative at 48hrs, and wound does not grow any additional organisms  3. Would discuss with CVTS- mediastinal fluid collection and relationship to LVAD; any indication for I&D of collection along LVAD outflow tract  4. Following blood and wound cultures  5. Monitor diarrhea  6. Ordered wound culture from dressing change on 10/28    Thank you for this consult. ID will continue to follow.     Patient was discussed with Dr. Baca.     Grazyna Millan PA-C  Infectious Disease  Pager # [phone]  ________________________________________________________________    Consult Question: LVAD with green purulence from driveline, please evaluate and advise.  Admission Diagnosis: Fever and chills [R50.9]         History of Present Illness:     Dandy Sands is a 62 year old male with history of SLE on hydroxychloroquine, antiphospholipid ab syndrome on warfarin, CAD, HFrEF due to ICM, s/p LVAD implantation (7/8/22), recent COVID-19 infection (10/13/22), C.diff (9/7/22), chronic medication-related diarrhea who presented to ED on 10/27/22 with nausea, vomiting, and purulent drainage from LVAD driveline.     Driveline site was previously doing well, he had some brown drainage in the recent past. Son noted green purulent drainage when changing driveline dressing on 10/25. They contacted LVAD coordinator and went in for a culture, doxycycline was started 10/26. He had fever to 101F with rigors and chills on evening of 10/27 so presented to ED. Since starting doxycycline has had \"a lot\" of  watery diarrhea increased from baseline chronic diarrhea. Nausea improved today, now feels very hungry and is eager to eat lunch.  Also notes cough for several days. Occasionally productive of green sputum.           Review of Systems:   CONSTITUTIONAL:  +fever, chills, rigors  EYES: negative for icterus  ENT:  negative for sore " throat  RESPIRATORY:  +cough, occasional green sputum production, and pain in left chest with deep breaths. Negative for shortness of breath.  CARDIOVASCULAR:  negative for chest pain, dyspnea  GASTROINTESTINAL:  +nausea, vomiting, diarrhea. Negative for abdominal cramping, blood in stools.  GENITOURINARY:  negative for dysuria  MUSCULOSKELETAL: negative for joint pain, myalgia  INTEGUMENT:  +green drainage from LVAD driveline site, associated tenderness  NEURO:  Negative for headache         Past Medical History:     Past Medical History:   Diagnosis Date     Antiphospholipid antibody syndrome (H)      CAD (coronary artery disease)      Chronic systolic heart failure (H)      Ischemic cardiomyopathy      Mitral regurgitation      Systemic lupus erythematosus (H)             Past Surgical History:     Past Surgical History:   Procedure Laterality Date     COLONOSCOPY N/A 05/23/2022    Procedure: COLONOSCOPY;  Surgeon: Parth Meneses MD;  Location: [facility] OR     CV CORONARY ANGIOGRAM N/A 05/24/2022    Procedure: Coronary Angiogram;  Surgeon: Mike Pope MD;  Location: [facility] CARDIAC CATH LAB     CV CORONARY ANGIOGRAM N/A 05/29/2022    Procedure: Coronary Angiogram;  Surgeon: Austin Bright MD;  Location: [facility] CARDIAC CATH LAB     CV CORONARY LITHOTRIPSY PCI Bilateral 05/24/2022    Procedure: Percutaneous Coronary Intervention - Lithotripsy;  Surgeon: Mike Pope MD;  Location: [facility] CARDIAC CATH LAB     CV INTRA AORTIC BALLOON N/A 06/17/2022    Procedure: Intraprocedure Aortic Balloon Pump Insertion;  Surgeon: Sherman Cerda MD;  Location: [facility] CARDIAC CATH LAB     CV INTRA AORTIC BALLOON Right 07/03/2022    Procedure: Reposition of Subclavian Intraprocedure Aortic Balloon Pump;  Surgeon: Austin Bright MD;  Location: [facility] CARDIAC CATH LAB     CV INTRAVASULAR ULTRASOUND N/A 05/24/2022    Procedure: Intravascular Ultrasound;  Surgeon: Mike Pope MD;  Location:   [facility]ARD[facility] CATH [facility]     CV PCI ANGIOPLASTY N/A 05/29/2022    Procedure: Percutaneous Transluminal Angioplasty;  Surgeon: Austin Bright MD;  Location:  [facility]ARD[facility] C[facility]     CV PCI STENT DRUG ELUTING N/A 05/24/2022    Procedure: Percutaneous Coronary Intervention Stent;  Surgeon: Mike Pope MD;  Location:  [facility] CARD[facility]     CV RIGHT HEART CATH MEASUREMENTS RECORDED N/A 05/18/2022    Procedure: Right Heart Catheterization;  Surgeon: Justo Stewart MD;  Location:  [facility] CARDIAC CATH LAB     CV RIGHT HEART CATH MEASUREMENTS RECORDED N/A 05/24/2022    Procedure: Right Heart Catheterization;  Surgeon: Mike Pope MD;  Location:  [facility] CARDI[facility] CATH LAB     CV RIGHT HEART CATH MEASUREMENTS RECORDED N/A 05/29/2022    Procedure: Right Heart Catheterization;  Surgeon: Austin Bright MD;  Location:  [facility] CARDIAC CATH LAB     CV RIGHT HEART CATH MEASUREMENTS RECORDED N/A 07/01/2022    Procedure: Right Heart Catheterization;  Surgeon: Mike Pope MD;  Location:  [facility] CARDIAC CATH LAB     CV RIGHT HEART CATH MEASUREMENTS RECORDED N/A 9/23/2022    Procedure: Right Heart Catheterization;  Surgeon: Justo Stewart MD;  Location:  [facility] CARDIAC CATH LAB     CV RIGHT HEART EXERCISE STRESS STUDY N/A 05/18/2022    Procedure: Stress Drug Study;  Surgeon: Justo Stewart MD;  Location:  HEART CARDIAC CATH LAB     CV SWAN CHOCO PROCEDURE N/A 06/17/2022    Procedure: Rover Choco Procedure;  Surgeon: Sherman Cerda MD;  Location:  HEART CARDIAC CATH LAB     INCISION AND DRAINAGE CHEST WASHOUT, COMBINED N/A 07/11/2022    Procedure: Chest washout and closure;  Surgeon: Darnell Morgan MD;  [facility]     INCISION AND DRAINAGE CHEST WASHOUT, COMBINED N/A 07/12/2022    Procedure: INCISION AND DRAINAGE, WOUND, CHEST, WITH IRRIGATION;  Surgeon: Darius Zamora MD;  Location:  OR     INSERT ARTERIAL LINE  07/18/2022          INSERT INTRAAORTIC BALLOON PUMP Right  06/23/2022    Procedure: INSERTION, INTRA-AORTIC BALLOON PUMP RIGHT SUBCLAVIAN ARTERY.;  Surgeon: Hayden Veras MD;  Location: [facility] OR     INSERT VENTRICULAR ASSIST DEVICE LEFT (HEARTMATE II/III) MINIMALLY INVASIVE N/A 07/08/2022    Procedure: MEDIAN STERNOTOMY.  CARDIOPULMONARY BYPASS.  INTRAOPERATIVE TRANSESOPHAGEAL ECHOCARDIOGRAM.  IMPLANT LEFT VENTRICULAR ASSIST DEVICE HEARTMATE III.  PLACEMENT OF PERCUTANEOUS RIGHT VENTRICULAR ASSIST DEVICE.  TEMPORARY CHEST CLOSURE;  Surgeon: Darius Zamora MD;  Location: [facility] OR     IR CHEST TUBE PLACEMENT NON-TUNNELED RIGHT  08/01/2022     IRRIGATION AND DEBRIDEMENT CHEST WASHOUT, COMBINED N/A 07/13/2022    Procedure: IRRIGATION AND DEBRIDEMENT, CHEST;  Surgeon: Darius Zamora MD;  Location: [facility] OR     MIDLINE DOUBLE LUMEN PLACEMENT Left 09/11/2022    Mid line ok to use     MIDLINE DOUBLE LUMEN PLACEMENT Right 09/14/2022    5FR DL midline.     PICC DOUBLE LUMEN PLACEMENT Right 05/28/2022    right basilic 5 fr dl picc 40 cm     PICC DOUBLE LUMEN PLACEMENT Right 08/15/2022    Right Lateral, 41 total length, 3 cm external length            Family History:   Reviewed and non-contributory.   No family history on file.         Social History:     Social History     Tobacco Use     Smoking status: Former     Smokeless tobacco: Never   Substance Use Topics     Alcohol use: Not on file     History   Sexual Activity     Sexual activity: Not on file            Current Medications:       aspirin  81 mg Oral Daily     atorvastatin  40 mg Oral QPM     digoxin  125 mcg Oral Daily     famotidine  20 mg Oral BID     hydroxychloroquine  200 mg Oral BID     piperacillin-tazobactam  3.375 g Intravenous Q6H     sodium chloride (PF)  3 mL Intracatheter Q8H     tamsulosin  0.4 mg Oral Daily     vancomycin  1,000 mg Intravenous Q12H     Warfarin Therapy Reminder  1 each Oral See Admin Instructions            Allergies:   No Known Allergies         Physical Exam:   Vitals were  "reviewed  Patient Vitals for the past 24 hrs:   BP Temp Temp src Pulse Resp SpO2 Height Weight   10/28/22 1115 -- -- -- 114 -- 94 % -- --   10/28/22 0815 -- -- -- 112 -- 92 % -- --   10/28/22 0800 -- 99.6  F (37.6  C) Oral -- -- -- -- --   10/28/22 0608 -- 100  F (37.8  C) Oral -- -- -- -- --   10/28/22 0600 (!) 115/92 -- -- 118 -- 95 % -- --   10/28/22 0422 90/60 99.4  F (37.4  C) Oral 106 18 94 % -- --   10/27/22 2312 113/59 99.3  F (37.4  C) Oral 114 18 97 % -- --   10/27/22 2157 -- 99.2  F (37.3  C) Oral 114 18 97 % 1.702 m (5' 7\") 63.5 kg (140 lb)       Physical Examination:  GENERAL:  Awake, alert, oriented. Lying supine in bed.  HEENT:  Head is normocephalic, atraumatic   EYES:  Eyes have anicteric sclerae without conjunctival injection.    ENT:  Oropharynx is moist without exudates or ulcers. Tongue is midline.  NECK:  Supple.   LUNGS:  Right clear, faint left sided crackles. No wheezing or ronchi. Normal respiratory effort on RA.  CARDIOVASCULAR:  +LVAD hum.  ABDOMEN:  Soft, nondistended, nontender.  SKIN:  LVAD driveline with green drainage (See photo), tender with collection of culture.  +PIV x2. No jaundice, rashes. Skin warm and dry.  NEUROLOGIC:  Grossly nonfocal. Active x4 extremities                 Laboratory Data:     Inflammatory Markers    Recent Labs   Lab Test 10/28/22  0533 10/27/22  2212 10/26/22  1604 10/18/22  0606 10/17/22  0534 10/16/22  0640 10/15/22  0724 10/14/22  1043 08/03/22  0606 06/19/22  1522   SED  --   --   --   --   --   --   --   --   --  39*   .00* 86.70* 83.70* 29.70* 50.30* 70.80* 111.00* 131.00*   < > 28.0*    < > = values in this interval not displayed.       Hematology Studies    Recent Labs   Lab Test 10/28/22  0852 10/27/22  2212 10/27/22  2211 10/26/22  1604 10/18/22  0606 10/17/22  0534 10/16/22  0640 07/20/22  1523 07/20/22  0949 07/20/22  0355 07/19/22  2049 07/19/22  1601 07/19/22  0946 07/19/22  0322 07/18/22  2118   WBC 15.3* 13.1*  --  8.3 4.9 5.1 6.0   " < > 15.9* 13.9* 22.4*   < > 18.5* 15.5* 19.0*   ANEU  --   --   --   --   --   --   --   --  14.6* 11.8* 19.5*  --  16.3* 12.2* 17.5*   AEOS  --   --   --   --   --   --   --   --  0.5 0.1 0.4  --  0.2 0.3 0.4   HGB 9.3* 9.8* 10.2* 9.7* 9.5* 10.1* 9.5*   < > 8.5* 8.1* 8.2*   < > 8.3* 7.0* 7.6*   MCV 94 94  --  93 92 91 93   < > 90 90 90   < > 89 92 91    247  --  239 205 193 161   < > 150 157 200   < > 164 155 179    < > = values in this interval not displayed.       Metabolic Studies     Recent Labs   Lab Test 10/28/22  0852 10/27/22  2212 10/27/22  2211 10/26/22  1604 10/18/22  0606 10/17/22  1442   * 134* 136 139 137 134*   POTASSIUM 4.6 4.2 4.3 4.5 3.9 4.3   CHLORIDE 100 101  --  103 108* 108*   CO2 19* 20*  --  24 17* 17*   BUN 11.1 13.9  --  12.1 16.3 16.7   CR 0.76 0.72  --  0.75 0.69 0.78   GFRESTIMATED >90 >90  --  >90 >90 >90       Hepatic Studies    Recent Labs   Lab Test 10/28/22  0852 10/27/22  2212 10/26/22  1604 10/14/22  1742 10/13/22  2050 10/07/22  1405   BILITOTAL 0.7 0.4 0.4 0.5 0.7 0.4   ALKPHOS 108 124 109 82 100 125   ALBUMIN 3.2* 3.6 3.6 3.5 4.2 4.3   AST 26 21 20 21 26 27   ALT 10 11 7* 16 19 36       Microbiology:  Culture   Date Value Ref Range Status   10/27/2022 No growth after 12 hours  Preliminary   10/27/2022 No growth after 12 hours  Preliminary   10/26/2022 No growth after 1 day  Preliminary   10/26/2022 No growth after 1 day  Preliminary   10/26/2022 No anaerobic organisms isolated after 1 day  Preliminary   10/26/2022 Culture in progress  Preliminary   10/26/2022 1+ Corynebacterium striatum (A)  Preliminary     Comment:     Identification obtained by MALDI-TOF mass spectrometry research use only database. Test characteristics determined and verified by the [facility].Susceptibilities not routinely done   08/04/2022 No Growth  Final   08/04/2022 No Growth  Final   08/03/2022 No Growth  Final   08/03/2022 No Growth  Final   08/03/2022 No  Growth  Final   08/02/2022 No Growth  Final   08/02/2022 Positive on the 1st day of incubation (A)  Final   08/02/2022 Staphylococcus epidermidis (AA)  Final     Comment:     1 of 2 bottles   08/02/2022 Positive on the 1st day of incubation (A)  Final   08/02/2022 Enterococcus faecalis (AA)  Final     Comment:     1 of 2 bottles   08/01/2022 No Growth  Final   07/31/2022 No Growth  Final   07/30/2022 No Growth  Final   07/29/2022 No Growth  Final   07/28/2022 No Growth  Final   07/28/2022 No anaerobic organisms isolated  Final   07/28/2022 No Growth  Final   07/27/2022 No Growth  Final   07/25/2022 No Growth  Final   07/25/2022 No Growth  Final   07/25/2022 No Growth  Final   07/24/2022 No Growth  Final   07/23/2022 No Growth  Final   07/22/2022 No Growth  Final   07/21/2022 No Growth  Final   07/21/2022 No Growth  Final   07/21/2022 No Growth  Final   07/20/2022 No Growth  Final   07/19/2022 No Growth  Final   07/18/2022 No Growth  Final   07/17/2022 No Growth  Final   07/16/2022 No Growth  Final   07/15/2022 No Growth  Final   07/13/2022 No Growth  Final   07/13/2022 No Growth  Final   07/13/2022 No Growth  Final   07/12/2022 No Growth  Final   07/12/2022 4+ Candida albicans (A)  Final     Comment:     Susceptibilities not routinely done   07/12/2022 1+ Escherichia coli (A)  Final   07/12/2022 3+ Enterococcus faecalis (A)  Final   07/12/2022 No Growth  Final   07/12/2022 No Growth  Final   07/11/2022 No Growth  Final   07/10/2022 No Growth  Final   07/09/2022 No Growth  Final   05/19/2022 No Growth  Final       Urine Studies    Recent Labs   Lab Test 10/28/22  0926 09/19/22  0023 09/10/22  1222 08/31/22  1542 08/03/22  1110 07/21/22  2239   LEUKEST Negative Negative Negative Negative Negative Negative   WBCU 1 <1  --  4 21* 1       Vancomycin Levels    Recent Labs   Lab Test 08/09/22  0341 08/04/22  0744 07/10/22  1023   VANCOMYCIN 20.1 32.6* 12.8       Hepatitis B Testing   Recent Labs   Lab Test 05/20/22  0516    HBCAB Nonreactive   HEPBANG Nonreactive     Hepatitis C Testing     Hepatitis C Antibody   Date Value Ref Range Status   05/20/2022 Nonreactive Nonreactive Final     Respiratory Virus Testing    No results found for: RS, FLUAG          Imaging:   CXR (10/27/22)  IMPRESSION: Lungs are clear. Chronic blunting of the right costophrenic angle. No acute pleural effusion. No pneumothorax.    CT Chest/Abd/Pelvis w/contrast (10/27/22)  IMPRESSION:   1. Persistent collection in the anterior mediastinum measuring 4.5 x  2.1 cm, slowly decreasing since 8/20/2022. Findings favored to  represent a loculated hematoma. Cannot rule out superimposed infection  such as abscess, though this is not favored given lack of surrounding  inflammatory fat stranding.  2. Nonspecific free fluid in the pelvis. No acute intra-abdominal  findings.  3. Stable prominent axillary, mediastinal, and retroperitoneal lymph  nodes, likely reactive.  4. Thin residual pericardial effusion and trace left pleural effusion.  5. Stable LVAD with patent outflow tract and trace encapsulated fluid  about the drive line in the subcutaneous fat of the epigastric region.

## 2022-10-28 NOTE — PROGRESS NOTES
Dandy's son Amos called to report Dandy has a fever up to 101, vomiting, driveline site pain, and abdominal pain. He also has greenish drainage form his VAD driveline site. A culture of the driveline site and blood cultures were done 10/26 and doxycycline started. Today a CT abdomen/chest/pelvis was completed but not resulted yet.    I advised them to come to the ED now to be evaluated.

## 2022-10-28 NOTE — PHARMACY-ANTICOAGULATION SERVICE
Clinical Pharmacy - Warfarin Dosing Consult     Pharmacy has been consulted to manage this patient s warfarin therapy.  Indication: LVAD/RVAD  Therapy Goal: INR 2-3  Warfarin Prior to Admission: Yes  Warfarin PTA Regimen: 7.5 mg Sun, Wed and 5 mg all others days  Significant drug interactions: Recently started on doxycycline (possibly increase INR)    INR   Date Value Ref Range Status   10/27/2022 2.38 (H) 0.85 - 1.15 Final   10/26/2022 2.37 (H) 0.85 - 1.15 Final     Factor 10 Chromogenic   Date Value Ref Range Status   08/23/2022 24 (L) 70 - 130 % Final       Recommend warfarin 5 mg today (Late dose for 10-27).  Pharmacy will monitor Dandy Sands daily and order warfarin doses to achieve specified goal.      Please contact pharmacy as soon as possible if the warfarin needs to be held for a procedure or if the warfarin goals change.

## 2022-10-28 NOTE — H&P
[facility]    History and Physical - Hospitalist Service       Date of Admission:  10/27/2022    Assessment & Plan      Dandy Sands is a 62 year old male PMH of SLE, CAD, HFrEF secondary to ischemic cardiomyopathy s/p LVAD implantation and recent Covid infection admitted on 10/27/2022 who presents with nausea, vomiting and a purulent drainage from his LVAD driveline site    #Nausea & Vomiting  #Hyperinflammatory state  #Driveline purulent drainage  #HFrEF s/p HM3 LVAD  The patient presents with fever, elevated CRP, weakness, loss of appetite and difficulty tolerating oral intake. Family noted purulence while changing the LVAD site dressing two days ago. Doxycycline started with minimal symptom relief. A CT CAP was done today in the clinic and, although, not officially read yet, does not appear to show extensive inflammation around the driveline. Differential is broad at this time and could include driveline infection, viral illness, Covid sequelae. Given the diagnostic equipoise we will hold off starting broad spectrum antibiotics and watchfully wait the results of the blood cultures obtained yesterday in the clinic and today at the ED. He has a history of ICM with EF of 10-15% and underwent a high risk multivessel PCI in June after which he went into cardiogenic shock requiring LVAD support and temporary RVAD. Today, the patient's LVAD is running with no issues.  - Hold PTA hydralazine for now  - Hold PTA lisinopril for now  - Continue PTA doxycycline  - Blood cultures ordered and pending  - Wound cultures ordered and pending  - Ordered Procal  - Consider rheumatology consultation in case of suspected autoimmune involvement.   - Gentle fluid support due to poor oral intake (0.5L of NS)  - PRN Zofran  - Continue LVAD Cares (speed 5100 rpm)  - Continue PTA aspirin  - Pharmacy to dose warfarin (INR goal 2-3)  - Continue PTA digoxin (levels  ordered)    #Diarrhea  #Recent C Diff Infection  The patient was recently diagnosed with C Diff and has completed treatment with oral vancomycin. Currently he has watery diarrhea, onset two days ago w/o cramping. We will treat supportively and defer rechecking for C Diff due to high risk of false (+) and the fact that the diarrhea is mostly watery and non-inflammatory. Low threshold to check stool if diarrhea or patient's clinical presentation worsen.  - Hold PTA loperamide  - Gentle fluid support as above  - Low threshold checking C Diff as needed.  - Hold off PPIs    #SLE  #Antiphospholipid syndrome   Follows with rheumatology as outpatient, saw Dr. Sterling two days ago. At the time the patient was found to have no evidence of active autoimmunity or systemic inflammatory disease. Cellcept was discontinued and the patient was to stay only on hydroxychloroquine. However, due to recurrent hospitalizations the patient had minimal exposure to cellcept and this does not represent an abrupt cessation, therefore association between high inflammatory state and autoimmunity is trivial.  - Continue PTA hydroxychloroquine  - Consider rheumatology involvement if evidence of autoimmunity.     #GERD  - Continue PTA famotidine    #Hyperlipidemia  - Continue PTA atorvastatin    #Depression  #MIGUEL  - Holding PTA sertraline due to worsening diarrhea     #BPH  - Continue pta tamsulosin     Diet:  Cardiac  DVT Prophylaxis: Warfarin  Fitzgerald Catheter: Not present  Fluids: None  Central Lines: None  Cardiac Monitoring: None  Code Status:   Full    Clinically Significant Risk Factors Present on Admission               # Drug Induced Coagulation Defect: home medication list includes an anticoagulant medication    # Hypertension: home medication list includes antihypertensive(s)  # End stage heart failure: Ventricular assist device (VAD) present            Disposition Plan      Expected Discharge Date: 10/29/2022                The patient  will be formally staffed this AM.    Iesha Harper MD  Internal Medicine, PGY-2  [facility]  [phone]  Securely message with the Vocera Web Console (learn more here)  Text page via [facility] Paging/Directory       ______________________________________________________________________    Chief Complaint   Purulent drainage from the LVAD site    History is obtained from the patient and his son    History of Present Illness   Dandy Sands is a 62 year old male with PMH of SLE, CAD, HFrEF secondary to ischemic cardiomyopathy s/p LVAD implantation and recent Covid infection who presents with new nausea, vomiting, fevers and purulent drainage from his driveline. The patient was recovering from his recent admission for Covid infection when he started feeling weaker about 4-5 days ago. Two days ago when his son was changing his driveline dressing, he noted green purulent drainage. He reached out to the patient's LVAD coordinator and they started him on oral doxycycline. Two days ago the patient started having nausea and watery non-bloody vomiting. Today at 5:00 pm he started having fevers up to 101 F. His family brought him in the ED for evaluation. The patient reports having diffuse body aches. He notes that since starting doxycycline he has been having a watery diarrhea. Denies cramping. He denies any new rashes. Denies photophobia. Denies chest pain or palpitations.     Review of Systems    The 10 point Review of Systems is negative other than noted in the HPI or here.     Past Medical History    I have reviewed this patient's medical history and updated it with pertinent information if needed.   Past Medical History:   Diagnosis Date     Antiphospholipid antibody syndrome (H)      CAD (coronary artery disease)      Chronic systolic heart failure (H)      Ischemic cardiomyopathy      Mitral regurgitation      Systemic lupus erythematosus (H)         Past Surgical History   I have reviewed this  patient's surgical history and updated it with pertinent information if needed.  Past Surgical History:   Procedure Laterality Date     COLONOSCOPY N/A 05/23/2022    Procedure: COLONOSCOPY;  Surgeon: Parth Meneses MD;  Location: [facility] OR     CV CORONARY ANGIOGRAM N/A 05/24/2022    Procedure: Coronary Angiogram;  Surgeon: Mike Pope MD;  Location: [facility] HEART [facility]ARD[facility]AC [facility]     CV CORONARY ANGIOGRAM N/A 05/29/2022    Procedure: Coronary Angiogram;  Surgeon: Austin Bright MD;  Location: [facility]     CV CORONARY LITHOTRIPSY PCI Bilateral 05/24/2022    Procedure: Percutaneous Coronary Intervention - Lithotripsy;  Surgeon: Mike Pope MD;  Location: [facility]     CV INTRA AORTIC BALLOON N/A 06/17/2022    Procedure: Intraprocedure Aortic Balloon Pump Insertion;  Surgeon: Sherman Cerda MD;  Location: [facility]     CV INTRA AORTIC BALLOON Right 07/03/2022    Procedure: Reposition of Subclavian Intraprocedure Aortic Balloon Pump;  Surgeon: Austin Bright MD;  [facility]     CV INTRAVASULAR ULTRASOUND N/A 05/24/2022    Procedure: Intravascular Ultrasound;  Surgeon: Mike Pope MD;  [facility]: [facility]     CV PCI ANGIOPLASTY N/A 05/29/2022    Procedure: Percutaneous Transluminal Angioplasty;  Surgeon: Austin Bright MD;  [facility]: [facility]     CV PCI STENT DRUG ELUTING N/A 05/24/2022    Procedure: Percutaneous Coronary Intervention Stent;  Surgeon: Mike Pope MD;  [facility]     CV RIGHT HEART CATH MEASUREMENTS RECORDED N/A 05/18/2022    Procedure: Right Heart Catheterization;  Surgeon: Justo Stewart MD;  [facility]: [facility]     CV RIGHT HEART CATH MEASUREMENTS RECORDED N/A 05/24/2022    Procedure: Right Heart Catheterization;  Surgeon: Mike Pope MD;  [facility]: [facility]     CV RIGHT HEART CATH  MEASUREMENTS RECORDED N/A 05/29/2022    Procedure: Right Heart Catheterization;  Surgeon: Austin Bright MD;  Location:  HEART CARDIAC CATH LAB     CV RIGHT HEART CATH MEASUREMENTS RECORDED N/A 07/01/2022    Procedure: Right Heart Catheterization;  Surgeon: Mike Pope MD;  Location:  HEART CARDIAC CATH LAB     CV RIGHT HEART CATH MEASUREMENTS RECORDED N/A 9/23/2022    Procedure: Right Heart Catheterization;  Surgeon: Justo Stewart MD;  Location:  HEART CARDIAC CATH LAB     CV RIGHT HEART EXERCISE STRESS STUDY N/A 05/18/2022    Procedure: Stress Drug Study;  Surgeon: Justo Stewart MD;  Location:  HEART CARDIAC CATH LAB     CV SWAN CHOCO PROCEDURE N/A 06/17/2022    Procedure: Fitzwilliam Choco Procedure;  Surgeon: Sherman Cerda MD;  Location:  HEART CARDIAC CATH LAB     INCISION AND DRAINAGE CHEST WASHOUT, COMBINED N/A 07/11/2022    Procedure: Chest washout and closure;  Surgeon: Darnell Morgan MD;  [facility]: [facility]     INCISION AND DRAINAGE CHEST WASHOUT, COMBINED N/A 07/12/2022    Procedure: INCISION AND DRAINAGE, WOUND, CHEST, WITH IRRIGATION;  Surgeon: Darius Zamora MD;  Location: [facility]     INSERT ARTERIAL LINE  07/18/2022          INSERT INTRAAORTIC BALLOON PUMP Right 06/23/2022    Procedure: INSERTION, INTRA-AORTIC BALLOON PUMP RIGHT SUBCLAVIAN ARTERY.;  Surgeon: Hayden Veras MD;  Location: [facility]     INSERT VENTRICULAR ASSIST DEVICE LEFT (HEARTMATE II/III) MINIMALLY INVASIVE N/A 07/08/2022    Procedure: MEDIAN STERNOTOMY.  CARDIOPULMONARY BYPASS.  INTRAOPERATIVE TRANSESOPHAGEAL ECHOCARDIOGRAM.  IMPLANT LEFT VENTRICULAR ASSIST DEVICE HEARTMATE III.  PLACEMENT OF PERCUTANEOUS RIGHT VENTRICULAR ASSIST DEVICE.  TEMPORARY CHEST CLOSURE;  Surgeon: Darius Zamora MD;  Location: [facility] OR     IR CHEST TUBE PLACEMENT NON-TUNNELED RIGHT  08/01/2022     IRRIGATION AND DEBRIDEMENT CHEST WASHOUT, COMBINED N/A 07/13/2022    Procedure: IRRIGATION AND DEBRIDEMENT, CHEST;  Surgeon: Noah  Darius Chaudhry MD;  Location: [facility] OR     MIDLINE DOUBLE LUMEN PLACEMENT Left 09/11/2022    Mid line ok to use     MIDLINE DOUBLE LUMEN PLACEMENT Right 09/14/2022    5FR DL midline.     PICC DOUBLE LUMEN PLACEMENT Right 05/28/2022    right basilic 5 fr dl picc 40 cm     PICC DOUBLE LUMEN PLACEMENT Right 08/15/2022    Right Lateral, 41 total length, 3 cm external length        Social History   I have reviewed this patient's social history and updated it with pertinent information if needed. Dandy CHAPA Shavon  reports that he has quit smoking. He has never used smokeless tobacco. He denies drinking or any other substance use    Family History   Not contributory    Prior to Admission Medications   Prior to Admission Medications   Prescriptions Last Dose Informant Patient Reported? Taking?   acetaminophen (TYLENOL) 325 MG tablet   No No   Sig: Take 3 tablets (975 mg) by mouth every 8 hours as needed for mild pain   aspirin (ASA) 81 MG chewable tablet   No No   Sig: Take 1 tablet (81 mg) by mouth daily   Patient not taking: Reported on 10/25/2022   atorvastatin (LIPITOR) 40 MG tablet   No No   Sig: Take 1 tablet (40 mg) by mouth daily   digoxin (LANOXIN) 125 MCG tablet   No No   Sig: Take 1 tablet (125 mcg) by mouth daily   doxycycline hyclate (VIBRAMYCIN) 100 MG capsule   No No   Sig: Take 1 capsule (100 mg) by mouth 2 times daily   famotidine (PEPCID) 20 MG tablet   No No   Sig: Take 1 tablet (20 mg) by mouth 2 times daily   hydrALAZINE (APRESOLINE) 25 MG tablet   No No   Sig: Take 1 tablet (25 mg) by mouth 3 times daily   hydrocortisone 1 % CREA cream   No No   Sig: Place 1 g rectally daily as needed for itching   hydroxychloroquine (PLAQUENIL) 200 MG tablet   No No   Sig: Take 1 tablet (200 mg) by mouth 2 times daily   lisinopril (ZESTRIL) 10 MG tablet   No No   Sig: Take 1 tablet (10 mg) by mouth daily   loperamide (IMODIUM) 2 MG capsule   No No   Sig: Take 1 capsule (2 mg) by mouth 2 times daily as needed for diarrhea    melatonin 5 MG tablet   No No   Sig: Take 1 tablet (5 mg) by mouth At Bedtime   methylcellulose (CITRUCEL) 500 MG TABS tablet   No No   Sig: Take 2 tablets (1,000 mg) by mouth 2 times daily   multivitamin w/minerals (THERA-VIT-M) tablet  Spouse/Significant Other No No   Sig: Take 1 tablet by mouth daily   pramox-pe-glycerin-petrolatum (PREPARATION H) 1-0.25-14.4-15 % CREA cream  Spouse/Significant Other No No   Sig: Place rectally 2 times daily as needed for hemorrhoids Apply immediately after a bowel movement.   saline nasal (AYR SALINE) GEL topical gel  Spouse/Significant Other Yes No   Sig: Apply 1 applicator into each nare nightly as needed for congestion   sodium chloride (OCEAN) 0.65 % nasal spray  Spouse/Significant Other Yes No   Sig: Spray 2 sprays into both nostrils every 4 hours as needed for congestion   tamsulosin (FLOMAX) 0.4 MG capsule   No No   Sig: Take 1 capsule (0.4 mg) by mouth daily   warfarin ANTICOAGULANT (COUMADIN) 2.5 MG tablet  Spouse/Significant Other No No   Sig: Take 2 tabs (5mg) daily at bedtime. Future dose adjustments pending INR   Patient taking differently: Take 7.5 mg by mouth every evening   warfarin ANTICOAGULANT (COUMADIN) 2.5 MG tablet   No No   Sig: Take 2 tablets (5 mg) to 3 tablets (7.5 mg) by mouth daily or as directed by Anticoagulation Clinic.      Facility-Administered Medications: None     Allergies   No Known Allergies    Physical Exam   Vital Signs: Temp: 99.3  F (37.4  C) Temp src: Oral BP: 113/59 Pulse: 114   Resp: 18 SpO2: 97 % O2 Device: None (Room air)    Weight: 140 lbs 0 oz    General Appearance: AAOx3, tired looking, lying in his bed   Eyes: PERRLA, EOMI   HEENT: NCAT, OP, NP mucosae are dry  Respiratory: CTAB, no crackles, rales or wheezing   Cardiovascular: LVAD hum appreciated, tachycardic, S1, S2, no mrg, no EDMAR.  GI: soft, not distended, diffusely tender to light palpation, bowel sounds (+)  Skin: No rashes  Neurologic: AAOx3, moves freely all  extremities, symmetrically weak, Kernig, Brudzinski (-)  Psychiatric: Tired but cooperative, normal affect    Data   Data reviewed today: I reviewed all medications, new labs and imaging results over the last 24 hours. I personally reviewed the chest x-ray image(s) showing small right sided pleural effusion, with ICD and LVAD in place..    Recent Labs   Lab 10/27/22  2212 10/27/22  2211 10/26/22  1604 10/24/22  0000   WBC 13.1*  --  8.3  --    HGB 9.8* 10.2* 9.7*  --    MCV 94  --  93  --      --  239  --    INR 2.38*  --  2.37* 2.4*   * 136 139  --    POTASSIUM 4.2 4.3 4.5  --    CHLORIDE 101  --  103  --    CO2 20*  --  24  --    BUN 13.9  --  12.1  --    CR 0.72  --  0.75  --    ANIONGAP 13  --  12  --    ELDA 8.9  --  9.5  --    * 110* 95  --    ALBUMIN 3.6  --  3.6  --    PROTTOTAL 7.5  --  7.4  --    BILITOTAL 0.4  --  0.4  --    ALKPHOS 124  --  109  --    ALT 11  --  7*  --    AST 21  --  20  --

## 2022-10-28 NOTE — PHARMACY-VANCOMYCIN DOSING SERVICE
"Pharmacy Vancomycin Initial Note  Date of Service 2022  Patient's  1960  62 year old, male    Indication: Skin and Soft Tissue Infection    Current estimated CrCl = Estimated Creatinine Clearance: 96.8 mL/min (based on SCr of 0.72 mg/dL).    Creatinine for last 3 days  10/26/2022:  4:04 PM Creatinine 0.75 mg/dL  10/27/2022: 10:12 PM Creatinine 0.72 mg/dL    Recent Vancomycin Level(s) for last 3 days  No results found for requested labs within last 72 hours.      Vancomycin IV Administrations (past 72 hours)      No vancomycin orders with administrations in past 72 hours.                Nephrotoxins and other renal medications (From now, onward)    Start     Dose/Rate Route Frequency Ordered Stop    10/28/22 1030  vancomycin (VANCOCIN) 1000 mg in dextrose 5% 200 mL PREMIX         1,000 mg  200 mL/hr over 1 Hours Intravenous EVERY 12 HOURS 10/28/22 0956      10/28/22 0930  piperacillin-tazobactam (ZOSYN) 3.375 g vial to attach to  mL bag        Note to Pharmacy: For [facility], [facility] and [facility]: For Zosyn-naive patients, use the \"Zosyn initial dose + extended infusion\" order panel.    3.375 g  over 30 Minutes Intravenous EVERY 6 HOURS 10/28/22 0914            Contrast Orders - past 72 hours (72h ago, onward)    None          Insight[employer] Prediction of Planned Initial Vancomycin Regimen  Loading dose: N/A  Regimen: 1000 mg IV every 12 hours.  Start time: 09:57 on 10/28/2022  Exposure target: AUC24 (range)400-600 mg/L.hr   AUC24,ss: 515 mg/L.hr  Probability of AUC24 > 400: 76 %  Ctrough,ss: 15.5 mg/L  Probability of Ctrough,ss > 20: 29 %  Probability of nephrotoxicity (Lodise TAD ): 11 %          Plan:  1. Start vancomycin  1000 mg IV q12h.   2. Vancomycin monitoring method: AUC  3. Vancomycin therapeutic monitoring goal: 400-600 mg*h/L  4. Pharmacy will check vancomycin levels as appropriate in 1-3 Days.    5. Serum creatinine levels will be ordered daily for the first week of therapy and at least twice " weekly for subsequent weeks.      Taya Paige RPH

## 2022-10-28 NOTE — PROGRESS NOTES
[facility]    History and Physical - Cardiology 2 Heart Failure Service       Date of Admission:  10/27/2022    Assessment & Plan      Dandy Sands is a 62 year old male PMH of SLE, CAD, HFrEF secondary to ischemic cardiomyopathy s/p LVAD implantation and recent Covid infection admitted on 10/27/2022 who presents with nausea, vomiting and a purulent drainage from his LVAD driveline site.    Patient remains inpatient to continue infectious work-up for drive line infection and mediastinal mass.     Changes  - Adjusted to Vanc/Zosyn  - Infectious Disease consult  - CVTS consulted for possible incision and drainage  - Wound culture with corynebacterium     #Drive line infection  #Stage D HFrEF 2/2 ICM (EF 10-15%) s/p HM3 LVAD (7/28/22) and CRT-D ('06)  #CAD s/p PCI to LAD ('05)  The patient presents with fever, elevated CRP, weakness, loss of appetite and difficulty tolerating oral intake. Family noted purulence while changing the LVAD site dressing two days ago. Doxycycline started with minimal symptom relief. Admission CT CAP reveals persistent mediastinal mass with extensive inflammation around the driveline. Of note, patient with history of ICM with EF of 10-15% and underwent a high risk multivessel PCI in June after which he went into cardiogenic shock requiring LVAD support and temporary RVAD. No LVAD alarms.   - LVAD: LVAD speed 5100 (reduced from 5200 9/19/2022)  - Volume status: Euvolemic  - Dry weight: 134  - Weight on admission: 140  - GDMT              > BB: discontinued during a previous admission, continue to hold              > ACEi/ARB/ARNi: held pta lisinopril 10 mg daily              > MRA: None              > SGLT2i: None   - SCD ppx: s/p CRT-D (2006)  - Continue Warfarin  - Continue pta digoxin 125 mcg daily  - Continue pta atorvastatin 40 mg daily  - Continue daily standing weights  - Started Vanc/Zosyn  - Infectious disease consulted, appreciate  recs  - CVTS consulted, appreciate recs    Culture Results:  - 10/28 MRSA Nares Negative  - 10/28 Urine culture pending  - 10/27 Blood culture NGTD  - 10/27 Blood culture: NGTD  - 10/26 Blood culture NGTD  - 10/26 Blood culture: NGTD  - 10/26 Blood culture: NGTD  - 10/26 Anaerobic wound culture: NGTD  - 10/26 Aerobic wound culture: Corynebacterium    #Diarrhea  #Recent C Diff Infection  The patient was recently diagnosed with C Diff and has completed treatment with oral vancomycin. Currently he has watery diarrhea, onset two days ago w/o cramping.   - C.diff and enteric stool panel ordered  - Hold PTA loperamide    #SLE  #Antiphospholipid syndrome   Follows with rheumatology as outpatient, recent 10/25/22 evaluation notable for no evidence of active autoimmunity or systemic inflammatory disease. Cellcept was discontinued and the patient was to stay only on hydroxychloroquine. However, due to recurrent hospitalizations the patient had minimal exposure to cellcept and this does not represent an abrupt cessation, therefore association between high inflammatory state and autoimmunity is trivial.  - Continue PTA hydroxychloroquine    #GERD  - Continue PTA famotidine    #Hyperlipidemia  - Continue PTA atorvastatin    #Depression  #MIGUEL  - Holding PTA sertraline due to worsening diarrhea     #BPH  - Continue pta tamsulosin     Diet: Low Saturated Fat Na <2400 mgCardiac  DVT Prophylaxis: Warfarin  Fitzgerald Catheter: Not present  Fluids: None  Central Lines: None  Cardiac Monitoring: ACTIVE order. Indication: Infective endocarditis (48 hours) - additional guidance recommending until clinically stable  Code Status: Full Code Full    Patient discussed with [facility] staff.    Behzad Zeng MD  Internal Medicine, PGY-2  [facility]  [phone]  Securely message with the Vocera Web Console (learn more here)  Text page via [employer] Paging/Directory    ______________________________________________________________________    Interval History    NAEON. Previous notes reviewed. Patient confirms ongoing nausea with one episode of non-bloody emesis and intermittent diarrhea and subjective fevers. Also notes dried green drainage from drive line site. No other acute complaints. He denies chest pain, abdominal pain or shortness of breath.    Physical Exam   Vital Signs: Temp: 100  F (37.8  C) Temp src: Oral BP: 90/60 Pulse: 106   Resp: 18 SpO2: 94 % O2 Device: None (Room air)    Weight: 140 lbs 0 oz    General Appearance: AAOx3, tired looking, lying in his bed   Eyes: PERRLA, EOMI   HEENT: NCAT, OP, NP mucosae are dry  Respiratory: CTAB, no crackles, rales or wheezing   Cardiovascular: LVAD hum appreciated, tachycardic, S1, S2, no mrg, no EDMAR.  GI: soft, not distended, diffusely tender to light palpation, bowel sounds (+)  Skin: No rashes  Neurologic: AAOx3, moves freely all extremities, symmetrically weak  Psychiatric: Tired but cooperative, normal affect    Data   Data reviewed today: I reviewed all medications, new labs and imaging results over the last 24 hours.     Recent Labs   Lab 10/28/22  0533 10/27/22  2212 10/27/22  2211 10/26/22  1604   WBC  --  13.1*  --  8.3   HGB  --  9.8* 10.2* 9.7*   MCV  --  94  --  93   PLT  --  247  --  239   INR 2.30* 2.38*  --  2.37*   NA  --  134* 136 139   POTASSIUM  --  4.2 4.3 4.5   CHLORIDE  --  101  --  103   CO2  --  20*  --  24   BUN  --  13.9  --  12.1   CR  --  0.72  --  0.75   ANIONGAP  --  13  --  12   ELDA  --  8.9  --  9.5   GLC  --  100* 110* 95   ALBUMIN  --  3.6  --  3.6   PROTTOTAL  --  7.5  --  7.4   BILITOTAL  --  0.4  --  0.4   ALKPHOS  --  124  --  109   ALT  --  11  --  7*   AST  --  21  --  20

## 2022-10-28 NOTE — CONSULTS
"Cardiothoracic Surgery Consult Note    Consult Reason: Concern for driveline infection and \"mediastinal mass\"    HPI: Dandy Sands is a 62 year old male who presents to the ED overnight with purulent drainage from his driveline site as well as nausea, vomiting, and fevers.  He was seen earlier in the day in Cardiology clinic where he was started on oral doxycycline, imaging was performed, and a wound culture was sent.    Mr. Sands reports he and his family noted greenish drainage from his driveline site 2 days ago.  He had accompanying nausea, vomiting, diarrhea, fevers, chills, periumbilical abdominal pain, and dizziness/lightheadedness with activity (though he is uncertain whether this is worse than baseline).    PMH:  Past Medical History:   Diagnosis Date     Antiphospholipid antibody syndrome (H)      CAD (coronary artery disease)      Chronic systolic heart failure (H)      Ischemic cardiomyopathy      Mitral regurgitation      Systemic lupus erythematosus (H)      PSH:  Past Surgical History:   Procedure Laterality Date     COLONOSCOPY N/A 05/23/2022    Procedure: COLONOSCOPY;  Surgeon: Parth Meneses MD;  Location: [facility] OR     CV CORONARY ANGIOGRAM N/A 05/24/2022    Procedure: Coronary Angiogram;  Surgeon: Mike Pope MD;  Location:  [facility]     CV CORONARY ANGIOGRAM N/A 05/29/2022    Procedure: Coronary Angiogram;  Surgeon: Austin Bright MD;  Location:  [facility]     CV CORONARY LITHOTRIPSY PCI Bilateral 05/24/2022    Procedure: Percutaneous Coronary Intervention - Lithotripsy;  Surgeon: Mike Pope MD;  Location:  [facility] CATH [facility]     CV INTRA AORTIC BALLOON N/A 06/17/2022    Procedure: Intraprocedure Aortic Balloon Pump Insertion;  Surgeon: Sherman Cerda MD;  Location:  [facility]ART CARDIAC CATH LAB     CV INTRA AORTIC BALLOON Right 07/03/2022    Procedure: Reposition of Subclavian Intraprocedure Aortic Balloon Pump;  Surgeon: Deangelo" Austin ARCHER MD;  Location: [facility] CARDIAC CATH LAB     CV INTRAVASULAR ULTRASOUND N/A 05/24/2022    Procedure: Intravascular Ultrasound;  Surgeon: Mike Pope MD;  Location: [facility] CARDIAC CATH LAB     CV PCI ANGIOPLASTY N/A 05/29/2022    Procedure: Percutaneous Transluminal Angioplasty;  Surgeon: Austin Bright MD;  Location: [facility] CARDIAC CATH LAB     CV PCI STENT DRUG ELUTING N/A 05/24/2022    Procedure: Percutaneous Coronary Intervention Stent;  Surgeon: Mike Pope MD;  Location:  HEART CARDIAC CATH LAB     CV RIGHT HEART CATH MEASUREMENTS RECORDED N/A 05/18/2022    Procedure: Right Heart Catheterization;  Surgeon: Justo Stewart MD;  Location: [facility] CARDIAC CATH LAB     CV RIGHT HEART CATH MEASUREMENTS RECORDED N/A 05/24/2022    Procedure: Right Heart Catheterization;  Surgeon: Mike Pope MD;  Location:  HEART CARDIAC CATH LAB     CV RIGHT HEART CATH MEASUREMENTS RECORDED N/A 05/29/2022    Procedure: Right Heart Catheterization;  Surgeon: Austin Bright MD;  Location:  HEART CARDIAC CATH LAB     CV RIGHT HEART CATH MEASUREMENTS RECORDED N/A 07/01/2022    Procedure: Right Heart Catheterization;  Surgeon: Mike Pope MD;  Location:  HEART CARDIAC CATH LAB     CV RIGHT HEART CATH MEASUREMENTS RECORDED N/A 9/23/2022    Procedure: Right Heart Catheterization;  Surgeon: Justo Stewart MD;  Location: [facility] CARDIAC CATH LAB     CV RIGHT HEART EXERCISE STRESS STUDY N/A 05/18/2022    Procedure: Stress Drug Study;  Surgeon: Justo Stewart MD;  Location: [facility] CARDIAC CATH LAB     CV SWAN CHOCO PROCEDURE N/A 06/17/2022    Procedure: [place] Procedure;  Surgeon: Sherman Cerda MD;  Location: [facility] CARDIAC CATH LAB     INCISION AND DRAINAGE CHEST WASHOUT, COMBINED N/A 07/11/2022    Procedure: Chest washout and closure;  Surgeon: Darnell Morgan MD;  Location:  OR     INCISION AND DRAINAGE CHEST WASHOUT, COMBINED N/A 07/12/2022    Procedure: INCISION AND  DRAINAGE, WOUND, CHEST, WITH IRRIGATION;  Surgeon: Darius Zamora MD;  Location: [facility] OR     INSERT ARTERIAL LINE  07/18/2022          INSERT INTRAAORTIC BALLOON PUMP Right 06/23/2022    Procedure: INSERTION, INTRA-AORTIC BALLOON PUMP RIGHT SUBCLAVIAN ARTERY.;  Surgeon: Hayden Veras MD;  Location: [facility] OR     INSERT VENTRICULAR ASSIST DEVICE LEFT (HEARTMATE II/III) MINIMALLY INVASIVE N/A 07/08/2022    Procedure: MEDIAN STERNOTOMY.  CARDIOPULMONARY BYPASS.  INTRAOPERATIVE TRANSESOPHAGEAL ECHOCARDIOGRAM.  IMPLANT LEFT VENTRICULAR ASSIST DEVICE HEARTMATE III.  PLACEMENT OF PERCUTANEOUS RIGHT VENTRICULAR ASSIST DEVICE.  TEMPORARY CHEST CLOSURE;  Surgeon: Darius Zamora MD;  Location: [facility] OR     IR CHEST TUBE PLACEMENT NON-TUNNELED RIGHT  08/01/2022     IRRIGATION AND DEBRIDEMENT CHEST WASHOUT, COMBINED N/A 07/13/2022    Procedure: IRRIGATION AND DEBRIDEMENT, CHEST;  Surgeon: Darius Zamora MD;  Location: [facility] OR     MIDLINE DOUBLE LUMEN PLACEMENT Left 09/11/2022    Mid line ok to use     MIDLINE DOUBLE LUMEN PLACEMENT Right 09/14/2022    5FR DL midline.     PICC DOUBLE LUMEN PLACEMENT Right 05/28/2022    right basilic 5 fr dl picc 40 cm     PICC DOUBLE LUMEN PLACEMENT Right 08/15/2022    Right Lateral, 41 total length, 3 cm external length       FH:  No family history on file.    SH:  Social History     Socioeconomic History     Marital status: Single   Tobacco Use     Smoking status: Former     Smokeless tobacco: Never       Home Meds:  (Not in a hospital admission)    Allergies:  No Known Allergies    ROS:  ROS negative except as noted above, under HPI    Physical Exam:  Temp:  [99.2  F (37.3  C)-100  F (37.8  C)] 99.6  F (37.6  C)  Pulse:  [106-118] 114  Resp:  [18] 18  BP: ()/(59-92) 115/92  SpO2:  [92 %-97 %] 94 %     Gen: NAD, drowsy, dozing quietly on ED cart, calm, cooperative on exam  HEENT: normocephalic, atraumatic cranium, EOMI, sclerae anicteric. Lungs: Unlabored breathing on  RA  CV: low grade tachycardia, WWP, driveline site with trace erythema, no obvious significant drainage on dressing   Abd:  soft and non distended, mildly TTP along band extending laterally from umbilicus, no obvious masses or hernias  Musculoskeletal: FINCH, no gross deformities  Neuro: AOx3, CN II-VII grossly intact  Mental: normal mood and affect, regular rate of speech    Labs:  ABG No lab results found in last 7 days.     CBC  Recent Labs   Lab 10/28/22  0852 10/27/22  2212 10/27/22  2211 10/26/22  1604   WBC 15.3* 13.1*  --  8.3   HGB 9.3* 9.8* 10.2* 9.7*    247  --  239     BMP  Recent Labs   Lab 10/28/22  0852 10/27/22  2212 10/27/22  2211 10/26/22  1604   * 134* 136 139   POTASSIUM 4.6 4.2 4.3 4.5   CHLORIDE 100 101  --  103   CO2 19* 20*  --  24   BUN 11.1 13.9  --  12.1   CR 0.76 0.72  --  0.75   GLC 88 100* 110* 95     LFT  Recent Labs   Lab 10/28/22  0852 10/28/22  0533 10/27/22  2212 10/26/22  1604 10/24/22  0000   AST 26  --  21 20  --    ALT 10  --  11 7*  --    ALKPHOS 108  --  124 109  --    BILITOTAL 0.7  --  0.4 0.4  --    ALBUMIN 3.2*  --  3.6 3.6  --    INR  --  2.30* 2.38* 2.37* 2.4*     Cultures:  7-Day Micro Results     Collected Updated Procedure Result Status      10/28/2022 1459 10/28/2022 1506 Wound Aerobic Bacterial Culture Routine [[id number]]   Wound from Abdomen    In process Component Value   No component results               10/28/2022 0926 10/28/2022 0951 Urine Culture Aerobic Bacterial [[id number]]   Urine, Midstream    In process Component Value   No component results               10/28/2022 0925 10/28/2022 1135 MRSA MSSA PCR, Nasal Swab [[id number]]    Swab from Nares, Bilateral    Final result Component Value   MRSA Target DNA Negative   SA Target DNA Negative            10/27/2022 2214 10/27/2022 2331 Symptomatic; Unknown Influenza A/B & SARS-CoV2 (COVID-19) Virus PCR Multiplex Nasopharyngeal [[id number]]    Swab from Nasopharyngeal    Final result  Component Value   Influenza A PCR Negative   Influenza B PCR Negative   RSV PCR Negative   SARS CoV2 PCR Negative   NEGATIVE: SARS-CoV-2 (COVID-19) RNA not detected, presumed negative.            10/27/2022 2212 10/28/2022 1147 Blood Culture Peripheral Blood [[id number]]   Peripheral Blood    Preliminary result Component Value   Culture No growth after 12 hours  [P]                10/27/2022 2212 10/28/2022 1147 Blood Culture Peripheral Blood [[id number]]   Peripheral Blood    Preliminary result Component Value   Culture No growth after 12 hours  [P]                10/26/2022 1615 10/27/2022 1847 Blood Culture Arm, Left [[id number]]   Blood from Arm, Left    Preliminary result Component Value   Culture No growth after 1 day  [P]                10/26/2022 1604 10/27/2022 1847 Blood Culture Arm, Right [[id number]]   Blood from Arm, Right    Preliminary result Component Value   Culture No growth after 1 day  [P]                10/26/2022 1518 10/27/2022 1931 Anaerobic Bacterial Culture Routine [[id number]]   Wound from Abdomen    Preliminary result Component Value   Culture No anaerobic organisms isolated after 1 day  [P]                10/26/2022 1518 10/28/2022 1138 Wound Aerobic Bacterial Culture Routine with Gram Stain [[id number]]   (Abnormal)   Wound from Abdomen    Preliminary result Component Value   Culture Culture in progress  [P]     1+ Corynebacterium striatum  [P]     Identification obtained by MALDI-TOF mass spectrometry research use only database. Test characteristics determined and verified by the Infectious Diseases Diagnostic Laboratory.Susceptibilities not routinely done   Gram Stain Result No organisms seen  [P]     No white blood cells seen  [P]                      Imaging:  XR Chest 2 Views   Final Result   IMPRESSION: Lungs are clear. Chronic blunting of the right costophrenic angle. No acute pleural effusion. No pneumothorax.      Unchanged cardiomegaly. Postsurgical changes of  median sternotomy. Left chest wall pacer/defibrillator device is present. A left ventricular device is additionally noted, which appears in unchanged position. Surgical clips overlie the right axilla. An    additional metallic density is partially imaged at the mid abdomen, incompletely evaluated.         CT C/A/P 10/27/2022  IMPRESSION:   1. Persistent collection in the anterior mediastinum measuring 4.5 x  2.1 cm, slowly decreasing since 8/20/2022. Findings favored to  represent a loculated hematoma. Cannot rule out superimposed infection  such as abscess, though this is not favored given lack of surrounding  inflammatory fat stranding.  2. Nonspecific free fluid in the pelvis. No acute intra-abdominal  findings.  3. Stable prominent axillary, mediastinal, and retroperitoneal lymph  nodes, likely reactive.  4. Thin residual pericardial effusion and trace left pleural effusion.  5. Stable LVAD with patent outflow tract and trace encapsulated fluid  about the drive line in the subcutaneous fat of the epigastric region.     I have personally reviewed the examination and initial interpretation  and I agree with the findings.     YESENIA KEITH, DO     A/P: Patient is a 62 year old male who had a HM3 implanted in July 2022 with a prolonged subsequent hospital course.  He was admitted overnight with concern for driveline infection for which CVTS was consulted in addition to incidental mediastinal fluid collection as seen on CT.    Agree with ID consult and initiation of parenteral, broad-spectrum abx, as you have.    Given hx of c. Diff along with fever, leukocytosis, abd tenderness, and diarrhea, consider ruling out recurrent C. Diff infection.    Patient discussed with attending, Dr. Morgan, who has seen and evaluated this patient independently; imaging under review - surgical necessity TBD.  CVTS will continue to follow.    Debbie Montoya, CNP, [facility]-  Cardiothoracic Surgery  Pager [phone]    3:02 PM  October 28,  2022    Patient seen and examined. Investigations reviewed. I agree with the findings outlined in the LAURA note. CT findings suggestive of slowly resolving hematoma. Continue with plan for IV antibiotics. Plan discussed with Heart Failure service.

## 2022-10-28 NOTE — ED PROVIDER NOTES
[place] EMERGENCY DEPARTMENT ([facility])  10/27/22  ED Provider Note  [facility]      History     Chief Complaint   Patient presents with     Fever     HPI  Dandy Sands is a 62 year old male, LVAD patient, with a PMH of  ICM (EF 10-15%) s/p HM3 LVAD (7/28/22), CAD s/p PCI to LAD (2005), HTN, SLE, antiphospholipid syndrome, DVT/PE on warfarin, prior C. Diff and COVID, prior recurrent epistaxis, et al. who presents today with his son and concern for fever along with some lightheadedness and fatigue symptoms.    RECENT HISTORY REVIEW:  Patient was most recently admitted 10/13/2022 - 10/18/2022 to the Cardiology to service having had a COVID-19 infection, diarrhea and C. Difficile, electrolyte abnormalities, as well as multiple other medical comorbidities/conditions.  They noted improvement of his diarrhea after discontinuation of Myfortic, with plan to start that again when he is asymptomatic with regards to his COVID.     Per report of patient and his family, as an outpatient they have been wondering if he could be having issues or infection with his driveline and had recently started him on oral doxycycline.  See EMR/discharge summary for full details.      CURRENT PRESENTATION:  Patient presents today with his son and concern for new fever just today, as well as noticing some other abnormal symptoms.  He has been more fatigued, lightheaded (but without vertigo).  No syncope or near syncope, but just not feeling well.  No traumas or falls.  No seizures or new tremors.  No coordination abnormalities, numbness, tingling or focal weakness.  No new headaches, vision or hearing changes.  No other HEENT symptoms, sore throats, trouble swallowing or breathing.  No new chest pain or breathing symptoms.  No new cough.  No abdominal pain or change to bowel or bladder.  No blood noted.  No GI/ symptoms or changes.  No new rashes or skin changes. Have not noticed any LVAD alarms and  his readings have been near his normal baseline.  No other new symptoms or complaints this time.  Please see ROS for further details.    Past Medical History  Past Medical History:   Diagnosis Date     Antiphospholipid antibody syndrome (H)      CAD (coronary artery disease)      Chronic systolic heart failure (H)      Ischemic cardiomyopathy      Mitral regurgitation      Systemic lupus erythematosus (H)      Past Surgical History:   Procedure Laterality Date     COLONOSCOPY N/A 05/23/2022    Procedure: COLONOSCOPY;  Surgeon: Parth Meneses MD;  [facility]: [facility]     CV CORONARY ANGIOGRAM N/A 05/24/2022    Procedure: Coronary Angiogram;  Surgeon: Mike Pope MD;  [facility]     CV CORONARY ANGIOGRAM N/A 05/29/2022    Procedure: Coronary Angiogram;  Surgeon: Austin Bright MD;  [facility]     CV CORONARY LITHOTRIPSY PCI Bilateral 05/24/2022    Procedure: Percutaneous Coronary Intervention - Lithotripsy;  Surgeon: Mike Pope MD;  [facility]     CV INTRA AORTIC BALLOON N/A 06/17/2022    Procedure: Intraprocedure Aortic Balloon Pump Insertion;  Surgeon: Sherman Cerda MD;  [facility]     CV INTRA AORTIC BALLOON Right 07/03/2022    Procedure: Reposition of Subclavian Intraprocedure Aortic Balloon Pump;  Surgeon: Austin Bright MD;  Location:  [facility] CARDI[facility] CATH LAB     CV INTRAVASULAR ULTRASOUND N/A 05/24/2022    Procedure: Intravascular Ultrasound;  Surgeon: Mike Pope MD;  [facility]     CV PCI ANGIOPLASTY N/A 05/29/2022    Procedure: Percutaneous Transluminal Angioplasty;  Surgeon: Austin Bright MD;  Location:  [facility] CARD[facility] CATH LAB     CV PCI STENT DRUG ELUTING N/A 05/24/2022    Procedure: Percutaneous Coronary Intervention Stent;  Surgeon: Mike Pope MD;  Location:  [facility] CARDIAC CATH LAB     CV RIGHT HEART CATH MEASUREMENTS  RECORDED N/A 05/18/2022    Procedure: Right Heart Catheterization;  Surgeon: Justo Stewart MD;  [facility]     CV RIGHT HEART CATH MEASUREMENTS RECORDED N/A 05/24/2022    Procedure: Right Heart Catheterization;  Surgeon: Mike Pope MD;  [facility]     CV RIGHT HEART CATH MEASUREMENTS RECORDED N/A 05/29/2022    Procedure: Right Heart Catheterization;  Surgeon: Austin Bright MD;  [facility]     CV RIGHT HEART CATH MEASUREMENTS RECORDED N/A 07/01/2022    Procedure: Right Heart Catheterization;  Surgeon: Mike Pope MD;  [facility]     CV RIGHT HEART CATH MEASUREMENTS RECORDED N/A 9/23/2022    Procedure: Right Heart Catheterization;  Surgeon: Justo Stewart MD;  [facility]     CV RIGHT HEART EXERCISE STRESS STUDY N/A 05/18/2022    Procedure: Stress Drug Study;  Surgeon: Justo Stewart MD;  [facility]     CV SWAN CHOCO PROCEDURE N/A 06/17/2022    Procedure: Burgin Choco Procedure;  Surgeon: Sherman Cerda MD;  [facility]     INCISION AND DRAINAGE CHEST WASHOUT, COMBINED N/A 07/11/2022    Procedure: Chest washout and closure;  Surgeon: Darnell Morgan MD;  [facility]     INCISION AND DRAINAGE CHEST WASHOUT, COMBINED N/A 07/12/2022    Procedure: INCISION AND DRAINAGE, WOUND, CHEST, WITH IRRIGATION;  Surgeon: Darius Zamora MD;  Location: [facility]     INSERT ARTERIAL LINE  07/18/2022          INSERT INTRAAORTIC BALLOON PUMP Right 06/23/2022    Procedure: INSERTION, INTRA-AORTIC BALLOON PUMP RIGHT SUBCLAVIAN ARTERY.;  Surgeon: Hayden Veras MD;  Location: [facility]     INSERT VENTRICULAR ASSIST DEVICE LEFT (HEARTMATE II/III) MINIMALLY INVASIVE N/A 07/08/2022    Procedure: MEDIAN STERNOTOMY.  CARDIOPULMONARY BYPASS.  INTRAOPERATIVE TRANSESOPHAGEAL ECHOCARDIOGRAM.  IMPLANT LEFT VENTRICULAR ASSIST DEVICE HEARTMATE III.   PLACEMENT OF PERCUTANEOUS RIGHT VENTRICULAR ASSIST DEVICE.  TEMPORARY CHEST CLOSURE;  Surgeon: Darius Zamora MD;  Location: [facility]     IR CHEST TUBE PLACEMENT NON-TUNNELED RIGHT  08/01/2022     IRRIGATION AND DEBRIDEMENT CHEST WASHOUT, COMBINED N/A 07/13/2022    Procedure: IRRIGATION AND DEBRIDEMENT, CHEST;  Surgeon: Darius Zamora MD;  Location: [facility]     MIDLINE DOUBLE LUMEN PLACEMENT Left 09/11/2022    Mid line ok to use     MIDLINE DOUBLE LUMEN PLACEMENT Right 09/14/2022    5FR DL midline.     PICC DOUBLE LUMEN PLACEMENT Right 05/28/2022    right basilic 5 fr dl picc 40 cm     PICC DOUBLE LUMEN PLACEMENT Right 08/15/2022    Right Lateral, 41 total length, 3 cm external length     No current outpatient medications on file.    No Known Allergies  Family History  No family history on file.  Social History   Social History     Tobacco Use     Smoking status: Former     Smokeless tobacco: Never      Past medical history, past surgical history, medications, allergies, family history, and social history were reviewed with the patient. No additional pertinent items.       Review of Systems   Constitutional: Positive for appetite change, chills, fatigue and fever. Negative for diaphoresis.   HENT: Negative.    Eyes: Negative.    Respiratory: Negative for cough and shortness of breath.    Gastrointestinal: Negative for abdominal pain, nausea and vomiting.   Genitourinary: Negative.    Musculoskeletal: Negative for back pain, neck pain and neck stiffness.   Skin: Negative for color change and wound.   Allergic/Immunologic: Positive for immunocompromised state.   Neurological: Positive for light-headedness. Negative for dizziness, seizures, syncope, facial asymmetry, numbness and headaches. Weakness: generalized w/ fatigue, no focal weakness.   Psychiatric/Behavioral: Negative for suicidal ideas.   All other systems reviewed and are negative.     .  A complete review of systems was performed with pertinent  "positives and negatives noted in the HPI, and all other systems negative.    Physical Exam   Pulse: 114  Temp: 99.2  F (37.3  C)  Resp: 18  Height: 170.2 cm (5' 7\")  Weight: 63.5 kg (140 lb)  SpO2: 97 %  Physical Exam  CONSTITUTIONAL: Well-developed and well-nourished. Awake and alert. Non-toxic appearance. No acute distress.   HENT:   - Head: Normocephalic and atraumatic.   - Ears: External ear grossly normal.   - Nose: Nose normal. No rhinorrhea. No epistaxis.   - Mouth/Throat: MMM  EYES: Conjunctivae and lids are normal. No scleral icterus.   NECK: Normal range of motion and phonation normal. Neck supple.  No tracheal deviation, no stridor. No edema or erythema noted.  CARDIOVASCULAR: LVAD whir, seems to be well perfused, map in the 60's  PULMONARY/CHEST: Normal work of breathing. No accessory muscle usage or stridor. No respiratory distress. Obscured somewhat by LVAD  ABDOMEN: Soft, non-distended. No tenderness. No peritoneal findings, no rigidity, rebound or guarding.  No palpable masses or abnormal pulsatility appreciated.  MUSCULOSKELETAL: Extremities warm and seemingly well perfused. No edema or calf tenderness.  NEUROLOGIC: Awake, alert. Not disoriented. No seizure activity. GCS 15  SKIN: Skin is warm and dry. No rash noted. No diaphoresis. No pallor.   PSYCHIATRIC: Normal mood and affect. Speech and behavior normal. Thought processes linear. Cognition and memory are normal. No SI/HI reported.    ED Course     ED Course as of 10/29/22 1805   Thu Oct 27, 2022   2223 Discussed CT from earlier today in outpatient setting (which had not yet been read) w/ Rads tonight. They reviewed the imaging. No obvious cause for fevers, nothing in lungs, nothing obvious in abdomen. No obvious acute findings.   2239 Discussed with Cards 2 attending.  They will admit for further evaluation management.  Fellow also updated on admit/plan.  They will order antibiotics as we get some additional results back.  Appreciate their " assistance.        Assessments & Plan (with Medical Decision Making)   IMPRESSION:   62 year old male w/ PMH notable for ICM (EF 10-15%) s/p HM3 LVAD (7/28/22), CAD s/p PCI to LAD (2005), HTN, SLE, antiphospholipid syndrome, DVT/PE on warfarin, prior C. Diff and COVID, prior recurrent epistaxis, et al. who presents today with his son and concern for fever along with some lightheadedness and fatigue symptoms, as described further above.     Clinically, patient appears nontoxic, NAD. Vitals have MAP in the 60's and seemingly well perfused, normal mentation.  Ddx includes, but not limited to, driveline infection, bacteremia, worsening heart failure, electrolyte abnormality/metabolic derangement, other occult infection, amongst others    PLAN:   -Screening ECG, laboratory studies, repeat CXR (reviewed the CT from outpatient), discuss with Cardiology  - Risks/benefits of pursuing imaging reviewed and accepted.     RESULTS:  Labs: Pending  Urine: No sample yet provided  Imaging: Discussed the CT from outpatient clinic with the Radiology team, as above in the ED course section, discussed this again with the admitting Cardiology 2 attending    INTERVENTIONS:   - Holding off on Abx at this time, Cardiology will order after they see the patient if they think appropriate.     RE-EVALUATION:  - Pt otherwise continues to do well here in the ED, no acute issues or apparent concerning changes in vitals or clinical appearance.    DISCUSSIONS:  - w/ Cardiology attending and fellow: Reviewed patient/presentation, current state of workup/any pending studies. They will admit for further evaluation/management, F/U pending studies as needed, coordinate w/ consulting services as needed. They will initiate Abx. No additional requests/recommendations for workup/management for in the ED at this time.   - w/ Patient: I have reviewed the available findings, plan with patient/son. They expressed understanding and agreement with this plan. All  questions answered to the best of our ability at this time.     DISPOSITION/PLANNING:  IMPRESSION:   - Fever, lightheadedness, fatigue,malaise  DISPOSITION:  - Admit to Cardiology Heart Failure/2 Service for further evaluation/management  OTHER RECOMMENDATIONS:   - F/U cultures, labs, and written finalized imaging reports  - Initiate Abx as indicated, consults as needed      ______________________________________________________________________        --  Alana Philippe MD  [facility] EMERGENCY DEPARTMENT  10/27/2022     Alana Philippe MD  10/29/22 1826

## 2022-10-29 LAB
ALBUMIN SERPL BCG-MCNC: 3.2 G/DL (ref 3.5–5.2)
ALP SERPL-CCNC: 106 U/L (ref 40–129)
ALT SERPL W P-5'-P-CCNC: 6 U/L (ref 10–50)
ANION GAP SERPL CALCULATED.3IONS-SCNC: 12 MMOL/L (ref 7–15)
AST SERPL W P-5'-P-CCNC: 18 U/L (ref 10–50)
ATRIAL RATE - MUSE: 100 BPM
BACTERIA WND CULT: ABNORMAL
BASOPHILS # BLD AUTO: 0.1 10E3/UL (ref 0–0.2)
BASOPHILS NFR BLD AUTO: 0 %
BILIRUB SERPL-MCNC: 0.7 MG/DL
BUN SERPL-MCNC: 11.4 MG/DL (ref 8–23)
C DIFF GDH STL QL IA: POSITIVE
C DIFF TOX A+B STL QL IA: NEGATIVE
C DIFF TOX B STL QL: POSITIVE
CALCIUM SERPL-MCNC: 8.5 MG/DL (ref 8.8–10.2)
CHLORIDE SERPL-SCNC: 94 MMOL/L (ref 98–107)
CREAT SERPL-MCNC: 0.86 MG/DL (ref 0.67–1.17)
DEPRECATED HCO3 PLAS-SCNC: 21 MMOL/L (ref 22–29)
DIASTOLIC BLOOD PRESSURE - MUSE: NORMAL MMHG
EOSINOPHIL # BLD AUTO: 0.1 10E3/UL (ref 0–0.7)
EOSINOPHIL NFR BLD AUTO: 0 %
ERYTHROCYTE [DISTWIDTH] IN BLOOD BY AUTOMATED COUNT: 15.8 % (ref 10–15)
GFR SERPL CREATININE-BSD FRML MDRD: >90 ML/MIN/1.73M2
GLUCOSE SERPL-MCNC: 95 MG/DL (ref 70–99)
GRAM STAIN RESULT: ABNORMAL
GRAM STAIN RESULT: ABNORMAL
HCT VFR BLD AUTO: 27.9 % (ref 40–53)
HGB BLD-MCNC: 8.7 G/DL (ref 13.3–17.7)
IMM GRANULOCYTES # BLD: 0.1 10E3/UL
IMM GRANULOCYTES NFR BLD: 0 %
INR PPP: 2.74 (ref 0.85–1.15)
INTERPRETATION ECG - MUSE: NORMAL
LYMPHOCYTES # BLD AUTO: 0.6 10E3/UL (ref 0.8–5.3)
LYMPHOCYTES NFR BLD AUTO: 5 %
MCH RBC QN AUTO: 28.8 PG (ref 26.5–33)
MCHC RBC AUTO-ENTMCNC: 31.2 G/DL (ref 31.5–36.5)
MCV RBC AUTO: 92 FL (ref 78–100)
MONOCYTES # BLD AUTO: 0.7 10E3/UL (ref 0–1.3)
MONOCYTES NFR BLD AUTO: 5 %
NEUTROPHILS # BLD AUTO: 11.1 10E3/UL (ref 1.6–8.3)
NEUTROPHILS NFR BLD AUTO: 90 %
NRBC # BLD AUTO: 0 10E3/UL
NRBC BLD AUTO-RTO: 0 /100
P AXIS - MUSE: NORMAL DEGREES
PLATELET # BLD AUTO: 234 10E3/UL (ref 150–450)
POTASSIUM SERPL-SCNC: 3.9 MMOL/L (ref 3.4–5.3)
PR INTERVAL - MUSE: NORMAL MS
PROT SERPL-MCNC: 6.9 G/DL (ref 6.4–8.3)
QRS DURATION - MUSE: 162 MS
QT - MUSE: 534 MS
QTC - MUSE: 681 MS
R AXIS - MUSE: -63 DEGREES
RBC # BLD AUTO: 3.02 10E6/UL (ref 4.4–5.9)
SODIUM SERPL-SCNC: 127 MMOL/L (ref 136–145)
SYSTOLIC BLOOD PRESSURE - MUSE: NORMAL MMHG
T AXIS - MUSE: 68 DEGREES
VENTRICULAR RATE- MUSE: 98 BPM
WBC # BLD AUTO: 12.6 10E3/UL (ref 4–11)

## 2022-10-29 PROCEDURE — 214N000001 HC R&B CCU UMMC

## 2022-10-29 PROCEDURE — 85025 COMPLETE CBC W/AUTO DIFF WBC: CPT | Performed by: STUDENT IN AN ORGANIZED HEALTH CARE EDUCATION/TRAINING PROGRAM

## 2022-10-29 PROCEDURE — 87324 CLOSTRIDIUM AG IA: CPT | Performed by: STUDENT IN AN ORGANIZED HEALTH CARE EDUCATION/TRAINING PROGRAM

## 2022-10-29 PROCEDURE — 36415 COLL VENOUS BLD VENIPUNCTURE: CPT | Performed by: STUDENT IN AN ORGANIZED HEALTH CARE EDUCATION/TRAINING PROGRAM

## 2022-10-29 PROCEDURE — 99232 SBSQ HOSP IP/OBS MODERATE 35: CPT | Mod: GC | Performed by: INTERNAL MEDICINE

## 2022-10-29 PROCEDURE — 250N000013 HC RX MED GY IP 250 OP 250 PS 637

## 2022-10-29 PROCEDURE — 250N000011 HC RX IP 250 OP 636: Performed by: STUDENT IN AN ORGANIZED HEALTH CARE EDUCATION/TRAINING PROGRAM

## 2022-10-29 PROCEDURE — 250N000013 HC RX MED GY IP 250 OP 250 PS 637: Performed by: INTERNAL MEDICINE

## 2022-10-29 PROCEDURE — 85610 PROTHROMBIN TIME: CPT

## 2022-10-29 PROCEDURE — 250N000013 HC RX MED GY IP 250 OP 250 PS 637: Performed by: STUDENT IN AN ORGANIZED HEALTH CARE EDUCATION/TRAINING PROGRAM

## 2022-10-29 PROCEDURE — 87493 C DIFF AMPLIFIED PROBE: CPT | Performed by: STUDENT IN AN ORGANIZED HEALTH CARE EDUCATION/TRAINING PROGRAM

## 2022-10-29 PROCEDURE — 258N000003 HC RX IP 258 OP 636: Performed by: INTERNAL MEDICINE

## 2022-10-29 PROCEDURE — 250N000011 HC RX IP 250 OP 636: Performed by: INTERNAL MEDICINE

## 2022-10-29 PROCEDURE — 250N000011 HC RX IP 250 OP 636

## 2022-10-29 PROCEDURE — 99231 SBSQ HOSP IP/OBS SF/LOW 25: CPT | Performed by: SURGERY

## 2022-10-29 PROCEDURE — 80053 COMPREHEN METABOLIC PANEL: CPT | Performed by: STUDENT IN AN ORGANIZED HEALTH CARE EDUCATION/TRAINING PROGRAM

## 2022-10-29 PROCEDURE — 82040 ASSAY OF SERUM ALBUMIN: CPT | Performed by: STUDENT IN AN ORGANIZED HEALTH CARE EDUCATION/TRAINING PROGRAM

## 2022-10-29 RX ORDER — WARFARIN SODIUM 3 MG/1
3 TABLET ORAL
Status: COMPLETED | OUTPATIENT
Start: 2022-10-29 | End: 2022-10-29

## 2022-10-29 RX ORDER — VANCOMYCIN HYDROCHLORIDE 125 MG/1
125 CAPSULE ORAL 4 TIMES DAILY
Status: DISCONTINUED | OUTPATIENT
Start: 2022-10-29 | End: 2022-10-29

## 2022-10-29 RX ORDER — WARFARIN SODIUM 4 MG/1
4 TABLET ORAL
Status: DISCONTINUED | OUTPATIENT
Start: 2022-10-29 | End: 2022-10-29

## 2022-10-29 RX ORDER — VANCOMYCIN HYDROCHLORIDE 125 MG/1
125 CAPSULE ORAL DAILY
Status: DISCONTINUED | OUTPATIENT
Start: 2022-10-30 | End: 2022-10-31

## 2022-10-29 RX ORDER — MAGNESIUM SULFATE HEPTAHYDRATE 40 MG/ML
4 INJECTION, SOLUTION INTRAVENOUS ONCE
Status: COMPLETED | OUTPATIENT
Start: 2022-10-29 | End: 2022-10-29

## 2022-10-29 RX ADMIN — FAMOTIDINE 20 MG: 20 TABLET ORAL at 19:50

## 2022-10-29 RX ADMIN — TAMSULOSIN HYDROCHLORIDE 0.4 MG: 0.4 CAPSULE ORAL at 08:42

## 2022-10-29 RX ADMIN — Medication 5 MG: at 00:24

## 2022-10-29 RX ADMIN — PIPERACILLIN SODIUM AND TAZOBACTAM SODIUM 4.5 G: 4; .5 INJECTION, POWDER, LYOPHILIZED, FOR SOLUTION INTRAVENOUS at 16:37

## 2022-10-29 RX ADMIN — VANCOMYCIN HYDROCHLORIDE 750 MG: 1 INJECTION, POWDER, LYOPHILIZED, FOR SOLUTION INTRAVENOUS at 22:31

## 2022-10-29 RX ADMIN — WARFARIN SODIUM 3 MG: 3 TABLET ORAL at 17:09

## 2022-10-29 RX ADMIN — FAMOTIDINE 20 MG: 20 TABLET ORAL at 08:42

## 2022-10-29 RX ADMIN — ACETAMINOPHEN 650 MG: 325 TABLET, FILM COATED ORAL at 21:15

## 2022-10-29 RX ADMIN — ONDANSETRON 4 MG: 2 INJECTION INTRAMUSCULAR; INTRAVENOUS at 05:10

## 2022-10-29 RX ADMIN — PIPERACILLIN SODIUM AND TAZOBACTAM SODIUM 4.5 G: 4; .5 INJECTION, POWDER, LYOPHILIZED, FOR SOLUTION INTRAVENOUS at 21:15

## 2022-10-29 RX ADMIN — VANCOMYCIN HYDROCHLORIDE 1000 MG: 1 INJECTION, SOLUTION INTRAVENOUS at 11:20

## 2022-10-29 RX ADMIN — ACETAMINOPHEN 650 MG: 325 TABLET, FILM COATED ORAL at 08:42

## 2022-10-29 RX ADMIN — ATORVASTATIN CALCIUM 40 MG: 40 TABLET, FILM COATED ORAL at 19:51

## 2022-10-29 RX ADMIN — ACETAMINOPHEN 650 MG: 325 TABLET, FILM COATED ORAL at 17:09

## 2022-10-29 RX ADMIN — PIPERACILLIN SODIUM AND TAZOBACTAM SODIUM 4.5 G: 4; .5 INJECTION, POWDER, LYOPHILIZED, FOR SOLUTION INTRAVENOUS at 04:31

## 2022-10-29 RX ADMIN — DIGOXIN 125 MCG: 125 TABLET ORAL at 08:42

## 2022-10-29 RX ADMIN — VANCOMYCIN HYDROCHLORIDE 125 MG: 125 CAPSULE ORAL at 16:36

## 2022-10-29 RX ADMIN — PIPERACILLIN SODIUM AND TAZOBACTAM SODIUM 4.5 G: 4; .5 INJECTION, POWDER, LYOPHILIZED, FOR SOLUTION INTRAVENOUS at 10:29

## 2022-10-29 RX ADMIN — HYDROXYCHLOROQUINE SULFATE 200 MG: 200 TABLET, FILM COATED ORAL at 19:50

## 2022-10-29 RX ADMIN — HYDROXYCHLOROQUINE SULFATE 200 MG: 200 TABLET, FILM COATED ORAL at 08:42

## 2022-10-29 RX ADMIN — MAGNESIUM SULFATE IN WATER 4 G: 40 INJECTION, SOLUTION INTRAVENOUS at 12:44

## 2022-10-29 ASSESSMENT — ACTIVITIES OF DAILY LIVING (ADL)
ADLS_ACUITY_SCORE: 34
ADLS_ACUITY_SCORE: 33
ADLS_ACUITY_SCORE: 33
ADLS_ACUITY_SCORE: 31
ADLS_ACUITY_SCORE: 33
ADLS_ACUITY_SCORE: 41
ADLS_ACUITY_SCORE: 31
ADLS_ACUITY_SCORE: 33
ADLS_ACUITY_SCORE: 31
ADLS_ACUITY_SCORE: 34

## 2022-10-29 ASSESSMENT — ENCOUNTER SYMPTOMS
SEIZURES: 0
SHORTNESS OF BREATH: 0
COUGH: 0
EYES NEGATIVE: 1
NAUSEA: 0
APPETITE CHANGE: 1
CHILLS: 1
FACIAL ASYMMETRY: 0
FEVER: 1
HEADACHES: 0
DIAPHORESIS: 0
DIZZINESS: 0
NUMBNESS: 0
FATIGUE: 1
WOUND: 0
LIGHT-HEADEDNESS: 1
BACK PAIN: 0
VOMITING: 0
NECK STIFFNESS: 0
COLOR CHANGE: 0
NECK PAIN: 0
ABDOMINAL PAIN: 0

## 2022-10-29 NOTE — PLAN OF CARE
D: Admitted 10/27 with nausea, vomiting and a purulent drainage from his LVAD driveline site.     I: Monitored vitals and assessed patient status.   PRN:  IV zofran x1  melatonin at bedtime     A: A&Ox4. On room air. ST 100s-110s, VSS, afebrile. LVAD numbers WNL, no alarms overnight. Driveline dressing CDI. Urinating adequately. Soft BM x1 overnight. Intermittent nausea relieved by PRNs and gingerale.     P: Continue to monitor. Continue infectious work-up for drive line infection and mediastinal mass.

## 2022-10-29 NOTE — PHARMACY-VANCOMYCIN DOSING SERVICE
Pharmacy [facility] Vancomycin Dose Change Per Policy  Original Dose Ordered: Vancomycin 1000mg q12H  Dose Changed To: Vancomycin 750mg q12H    Current Regimen  Loading dose: N/A  Regimen: 1000 mg IV every 12 hours.  Start time: 14:09 on 10/29/2022  Exposure target: AUC24 (range)400-600 mg/L.hr   AUC24,ss: 620 mg/L.hr  Probability of AUC24 > 400: 88 %  Ctrough,ss: 19.5 mg/L  Probability of Ctrough,ss > 20: 48 %  Probability of nephrotoxicity (Lodise TAD 2009): 16 %    New Regimen  Loading dose: N/A  Regimen: 750 mg IV every 12 hours.  Start time: 14:09 on 10/29/2022  Exposure target: AUC24 (range)400-600 mg/L.hr   AUC24,ss: 471 mg/L.hr  Probability of AUC24 > 400: 66 %  Ctrough,ss: 14.9 mg/L  Probability of Ctrough,ss > 20: 27 %  Probability of nephrotoxicity (Lodise TAD 2009): 10 %      This dose change was based on the pharmacist's assessment of this patient's age, weight, concurrent drug therapy, treatment goals, whether patient's creatinine clearance adequately indicates renal function (factoring in age, muscle mass, fluid and clinical status), and, if applicable, prior pharmacokinetic data.    Creatinine Clearance= 78ml/min (SCr 0.86mg/dL)    Will continue to follow and modify dosage according to levels, organ function and clinical condition

## 2022-10-29 NOTE — PROGRESS NOTES
"  CVTS DAILY PROGRESS NOTE    Dandy Sands is a 62 year old male who is s/p HM3 implant in July 2022 with a prolonged subsequent hospital course.  He was admitted 10/27/2022 with concern for driveline infection for which CVTS was consulted in addition to incidental mediastinal fluid collection as seen on CT.    S:  Dandy states he is feeling better than yesterday, fevers are improving, good appetite - asks if he can eat, no nausea.    O:    BP (!) 138/105 (BP Location: Right arm)   Pulse 108   Temp 98.4  F (36.9  C) (Oral)   Resp 16   Ht 1.702 m (5' 7\")   Wt 61.8 kg (136 lb 4.8 oz)   SpO2 93%   BMI 21.35 kg/m      CBC  Recent Labs   Lab 10/29/22  0615 10/28/22  0852 10/27/22  2212 10/27/22  2211 10/26/22  1604   WBC 12.6* 15.3* 13.1*  --  8.3   RBC 3.02* 3.27* 3.37*  --  3.34*   HGB 8.7* 9.3* 9.8* 10.2* 9.7*   HCT 27.9* 30.8* 31.8* 30* 30.9*   MCV 92 94 94  --  93   MCH 28.8 28.4 29.1  --  29.0   MCHC 31.2* 30.2* 30.8*  --  31.4*   RDW 15.8* 15.7* 15.5*  --  15.3*    264 247  --  239     BMP  Recent Labs   Lab 10/29/22  0615 10/28/22  0852 10/27/22  2212 10/27/22  2211 10/26/22  1604   * 132* 134* 136 139   POTASSIUM 3.9 4.6 4.2 4.3 4.5   CHLORIDE 94* 100 101  --  103   ELDA 8.5* 9.2 8.9  --  9.5   CO2 21* 19* 20*  --  24   BUN 11.4 11.1 13.9  --  12.1   CR 0.86 0.76 0.72  --  0.75   GLC 95 88 100* 110* 95     Hepatic Panel  Recent Labs   Lab 10/29/22  0615 10/28/22  0852 10/27/22  2212 10/26/22  1604   AST 18 26 21 20   ALT 6* 10 11 7*   ALKPHOS 106 108 124 109   BILITOTAL 0.7 0.7 0.4 0.4   ALBUMIN 3.2* 3.2* 3.6 3.6     INR  Recent Labs   Lab 10/29/22  0615 10/28/22  0533 10/27/22  2212 10/26/22  1604   INR 2.74* 2.30* 2.38* 2.37*        IMAGING:  No results found for this or any previous visit (from the past 24 hour(s)).    EXAM:  Gen:  NAD, dozing in bed, rouses to voice  Neuro:  A&O, CN II-VII grossly intact, no focal deficits   Resp:  unlabored breathing on RA  CV:  WWP, no LE edema  Int:  " driveline site with negligible erythema, nickel-sized amount of dried yellow-green drainage on dressing.    A/P:    VS, pertinent studies, and nursing notes reviewed from the past 24 hrs.     - Mediastinal collection believed to represent slowly resolving hematoma   - Continue parenteral abx, as you are   - No indication for surgical intervention at this time; continue meticulous driveline cares.    Discussed with Dr. Morgan; CVTS will sign off at this time, please call/re-consult for questions.  Thank you for the opportunity to participate in Mr. Sands's care.    Debbie Montoya, CNP, [facility]  Cardiothoracic Surgery  Pager [phone]

## 2022-10-29 NOTE — PROGRESS NOTES
62 year old male who is s/p HM3 implant in July 2022 with prolonged subsequent hospital course. Pt admitted 10/27/22 with concern for driveline infection and mediastinal fluid collection seen on Chest and abdomen CT scan.     Change Today:     * Continue Broad spectrum antibiotic while blood culture pending, driveline culture back positive, and C-diff still positive-PO vancro. ID consulted.   * Magnesium 4g IV replaced once per card 2   * Advanced diet to 2g sodium diet and 2L FR.   * sodium today 127 and card 2 aware. Possible place small dose diuretic  * PT ordered placed.    Neuro: A&O x4, denied pain and dizziness.  Respiratory: Incentive pulmonary hygiene up to 1500ML. O2 sating >96% on RA.  Denies SOB.   Cardiac: Tele show SR. MAP 70-78s. Denies chest pain.  HM3 LVAD running at a fixed rate of 5100 rpm, no alarms during shift.  Flow 3.8 , PI 3.6-4.5.  Drive line site some green/yellow dry drainage. dressing changed today. CDI.  Afebrile.   GI: Denied nausea, bowel sounds audible.  1 loose BM today. 2g NA.   : Had adequate urine output, see I&O flowsheet. 2L FR  Skin: See PCS for assessment and treatment of wounds and surgical incisions.   Electrolytes: Replaced with 4g magnesium IV per card2  Pain: Reported chroninc pain, was given PRN tylenol with good relief. Declines heat pack or warm pack.  Tests/procedures: No test performed during shift, see Chart Review for results.    Mobility: Ambulated in room with SBA, was steady on his feet.  Social: No visitor during shift.    Plan:  Continue broad spectrum antibiotic. Continue to monitor blood culture. Maintain C-diff isolation. Continue to monitor pain, VS, heart rhythm, LVAD function, drive line site integrity, fluid status, bowel status, cardiac and respiratory status. Notify card 2 of changes in patient condition or other concerns.

## 2022-10-29 NOTE — PROGRESS NOTES
[facility]    History and Physical - Cardiology 2 Heart Failure Service       Date of Admission:  10/27/2022    Assessment & Plan      Dandy Sands is a 62 year old male PMH of SLE, CAD, HFrEF secondary to ischemic cardiomyopathy s/p LVAD implantation and recent Covid infection admitted on 10/27/2022 who presents with nausea, vomiting and a purulent drainage from his LVAD driveline site.    Patient remains inpatient for ongoing management and work-up of drive line infection.    Changes  - Continue Vanc/Zosyn  - Cdiff colonization, added prophylactic oral vancomycin   - CVTS consulted, no indication for I&D  - Wound culture with corynebacterium     #Drive line infection  #Stage D HFrEF 2/2 ICM (EF 10-15%) s/p HM3 LVAD (7/28/22) and CRT-D ('06)  #CAD s/p PCI to LAD ('05)  The patient presents with fever, elevated CRP, weakness, loss of appetite and difficulty tolerating oral intake. Family noted purulence while changing the LVAD site dressing two days ago. Doxycycline started with minimal symptom relief. Admission CT CAP reveals persistent mediastinal mass with extensive inflammation around the driveline. Of note, patient with history of ICM with EF of 10-15% and underwent a high risk multivessel PCI in June after which he went into cardiogenic shock requiring LVAD support and temporary RVAD. No LVAD alarms.   - LVAD: LVAD speed 5100 (reduced from 5200 9/19/2022)  - Volume status: Euvolemic  - Dry weight: 134  - Weight on admission: 140  - GDMT              > BB: discontinued during a previous admission, continue to hold              > ACEi/ARB/ARNi: held pta lisinopril 10 mg daily              > MRA: None              > SGLT2i: None   - SCD ppx: s/p CRT-D (2006)  - Continue Warfarin  - Continue pta digoxin 125 mcg daily  - Continue pta atorvastatin 40 mg daily  - Continue daily standing weights  - Continue Vanc/Zosyn (10/28-)  - Infectious disease consulted,  appreciate recs  - CVTS consulted, appreciate recs    Culture Results:  - 10/28 MRSA Nares Negative  - 10/28 Urine culture pending  - 10/27 Blood culture NGTD  - 10/27 Blood culture: NGTD  - 10/26 Blood culture NGTD  - 10/26 Blood culture: NGTD  - 10/26 Blood culture: NGTD  - 10/26 Anaerobic wound culture: NGTD  - 10/26 Aerobic wound culture: Corynebacterium    #Diarrhea  #Recent C Diff Infection  The patient was recently diagnosed with C Diff and has completed treatment with oral vancomycin. Currently he has watery diarrhea, onset two days ago w/o cramping.   - C.diff and enteric stool panel consistent with colonization  - Added PO Vancomycin 125 daily as prophylaxis  - Hold PTA loperamide    #SLE  #Antiphospholipid syndrome   Follows with rheumatology as outpatient, recent 10/25/22 evaluation notable for no evidence of active autoimmunity or systemic inflammatory disease. Cellcept was discontinued and the patient was to stay only on hydroxychloroquine. However, due to recurrent hospitalizations the patient had minimal exposure to cellcept and this does not represent an abrupt cessation, therefore association between high inflammatory state and autoimmunity is trivial.  - Continue PTA hydroxychloroquine    #GERD  - Continue PTA famotidine    #Hyperlipidemia  - Continue PTA atorvastatin    #Depression  #MIGUEL  - Holding PTA sertraline due to worsening diarrhea     #BPH  - Continue pta tamsulosin     Diet: Low Saturated Fat Na <2400 mgCardiac  DVT Prophylaxis: Warfarin  Fitzgerald Catheter: Not present  Fluids: None  Central Lines: None  Cardiac Monitoring: ACTIVE order. Indication: Infective endocarditis (48 hours) - additional guidance recommending until clinically stable  Code Status: Full Code Full    Patient discussed with [facility] staff.    Behzad Zeng MD  Internal Medicine, PGY-2  [facility]  [phone]  Securely message with the Vocera Web Console (learn more here)  Text page via [employer]  Paging/Directory   ______________________________________________________________________    Interval History    NAEON. Previous notes reviewed. Patient notes significant improvement in nausea/vomiting and diarrhea since admission. No other acute complaints. He denies chest pain, abdominal pain or shortness of breath.    Physical Exam   Vital Signs: Temp: 98.1  F (36.7  C) Temp src: Oral BP: (!) 139/125 Pulse: 106   Resp: 16 SpO2: 93 % O2 Device: None (Room air)    Weight: 136 lbs 4.8 oz    General Appearance: No apparent distress  Eyes: PERRLA, EOMI   HEENT: NCAT, OP, NP mucosae are dry  Respiratory: CTAB, no crackles, rales or wheezing   Cardiovascular: LVAD hum appreciated, tachycardic, S1, S2, no mrg, no EDMAR.  GI: soft, not distended, diffusely tender to light palpation, bowel sounds (+), drive line site without clean, dry and without drainage when seen today.  Skin: No rashes  Neurologic: AAOx3, moves freely all extremities, symmetrically weak  Psychiatric: Fluent speech, fatigued but responsive to questions    Data   Data reviewed today: I reviewed all medications, new labs and imaging results over the last 24 hours.     Recent Labs   Lab 10/28/22  0852 10/28/22  0533 10/27/22  2212 10/27/22  2211 10/26/22  1604   WBC 15.3*  --  13.1*  --  8.3   HGB 9.3*  --  9.8* 10.2* 9.7*   MCV 94  --  94  --  93     --  247  --  239   INR  --  2.30* 2.38*  --  2.37*   *  --  134* 136 139   POTASSIUM 4.6  --  4.2 4.3 4.5   CHLORIDE 100  --  101  --  103   CO2 19*  --  20*  --  24   BUN 11.1  --  13.9  --  12.1   CR 0.76  --  0.72  --  0.75   ANIONGAP 13  --  13  --  12   ELDA 9.2  --  8.9  --  9.5   GLC 88  --  100* 110* 95   ALBUMIN 3.2*  --  3.6  --  3.6   PROTTOTAL 7.1  --  7.5  --  7.4   BILITOTAL 0.7  --  0.4  --  0.4   ALKPHOS 108  --  124  --  109   ALT 10  --  11  --  7*   AST 26  --  21  --  20

## 2022-10-29 NOTE — PROGRESS NOTES
Admission       -----------------------------------------------------------    Admitted from: ED  For: infectious work-up for drive line infection and mediastinal mass  Report given from: King TRUJILLO RN  Via: Cart  Family Aware of Admission: Yes  Chart: Arrived with patient  Belongings: To remain in patient's room, belongings include LVAD bag, backup batteries, clothes, slippers, cell phone, .  Admission Profile: Complete  Teaching: Oriented to unit, call light, call don't fall, meal times, visiting hours, when to call for help.  Access: PIV  Telemetry: Placed on patient  Ht./Wt.: Complete  2 RN Skin Check: Patient declined skin check overnight, will complete in the morning.

## 2022-10-30 ENCOUNTER — APPOINTMENT (OUTPATIENT)
Dept: PHYSICAL THERAPY | Facility: CLINIC | Age: 62
DRG: 314 | End: 2022-10-30
Payer: COMMERCIAL

## 2022-10-30 LAB
ALBUMIN SERPL BCG-MCNC: 3 G/DL (ref 3.5–5.2)
ALP SERPL-CCNC: 116 U/L (ref 40–129)
ALT SERPL W P-5'-P-CCNC: 7 U/L (ref 10–50)
ANION GAP SERPL CALCULATED.3IONS-SCNC: 13 MMOL/L (ref 7–15)
ANION GAP SERPL CALCULATED.3IONS-SCNC: 9 MMOL/L (ref 7–15)
AST SERPL W P-5'-P-CCNC: 18 U/L (ref 10–50)
BACTERIA UR CULT: ABNORMAL
BASOPHILS # BLD AUTO: 0 10E3/UL (ref 0–0.2)
BASOPHILS NFR BLD AUTO: 0 %
BILIRUB SERPL-MCNC: 0.4 MG/DL
BUN SERPL-MCNC: 13.2 MG/DL (ref 8–23)
BUN SERPL-MCNC: 13.7 MG/DL (ref 8–23)
CALCIUM SERPL-MCNC: 8.7 MG/DL (ref 8.8–10.2)
CALCIUM SERPL-MCNC: 9 MG/DL (ref 8.8–10.2)
CHLORIDE SERPL-SCNC: 100 MMOL/L (ref 98–107)
CHLORIDE SERPL-SCNC: 102 MMOL/L (ref 98–107)
CREAT SERPL-MCNC: 0.87 MG/DL (ref 0.67–1.17)
CREAT SERPL-MCNC: 0.9 MG/DL (ref 0.67–1.17)
DEPRECATED HCO3 PLAS-SCNC: 20 MMOL/L (ref 22–29)
DEPRECATED HCO3 PLAS-SCNC: 22 MMOL/L (ref 22–29)
EOSINOPHIL # BLD AUTO: 0.2 10E3/UL (ref 0–0.7)
EOSINOPHIL NFR BLD AUTO: 3 %
ERYTHROCYTE [DISTWIDTH] IN BLOOD BY AUTOMATED COUNT: 15.6 % (ref 10–15)
GFR SERPL CREATININE-BSD FRML MDRD: >90 ML/MIN/1.73M2
GFR SERPL CREATININE-BSD FRML MDRD: >90 ML/MIN/1.73M2
GLUCOSE SERPL-MCNC: 85 MG/DL (ref 70–99)
GLUCOSE SERPL-MCNC: 89 MG/DL (ref 70–99)
HCT VFR BLD AUTO: 26.7 % (ref 40–53)
HGB BLD-MCNC: 8.4 G/DL (ref 13.3–17.7)
IMM GRANULOCYTES # BLD: 0 10E3/UL
IMM GRANULOCYTES NFR BLD: 0 %
INR PPP: 3.14 (ref 0.85–1.15)
LYMPHOCYTES # BLD AUTO: 0.4 10E3/UL (ref 0.8–5.3)
LYMPHOCYTES NFR BLD AUTO: 6 %
MAGNESIUM SERPL-MCNC: 2.1 MG/DL (ref 1.7–2.3)
MCH RBC QN AUTO: 28.9 PG (ref 26.5–33)
MCHC RBC AUTO-ENTMCNC: 31.5 G/DL (ref 31.5–36.5)
MCV RBC AUTO: 92 FL (ref 78–100)
MONOCYTES # BLD AUTO: 0.5 10E3/UL (ref 0–1.3)
MONOCYTES NFR BLD AUTO: 6 %
NEUTROPHILS # BLD AUTO: 5.9 10E3/UL (ref 1.6–8.3)
NEUTROPHILS NFR BLD AUTO: 85 %
NRBC # BLD AUTO: 0 10E3/UL
NRBC BLD AUTO-RTO: 0 /100
PLATELET # BLD AUTO: 203 10E3/UL (ref 150–450)
POTASSIUM SERPL-SCNC: 4 MMOL/L (ref 3.4–5.3)
POTASSIUM SERPL-SCNC: 4.1 MMOL/L (ref 3.4–5.3)
PROT SERPL-MCNC: 6.6 G/DL (ref 6.4–8.3)
RBC # BLD AUTO: 2.91 10E6/UL (ref 4.4–5.9)
SODIUM SERPL-SCNC: 133 MMOL/L (ref 136–145)
SODIUM SERPL-SCNC: 133 MMOL/L (ref 136–145)
VANCOMYCIN SERPL-MCNC: 23.3 UG/ML
WBC # BLD AUTO: 7.1 10E3/UL (ref 4–11)

## 2022-10-30 PROCEDURE — 85025 COMPLETE CBC W/AUTO DIFF WBC: CPT | Performed by: STUDENT IN AN ORGANIZED HEALTH CARE EDUCATION/TRAINING PROGRAM

## 2022-10-30 PROCEDURE — 250N000013 HC RX MED GY IP 250 OP 250 PS 637

## 2022-10-30 PROCEDURE — 99232 SBSQ HOSP IP/OBS MODERATE 35: CPT | Mod: GC | Performed by: INTERNAL MEDICINE

## 2022-10-30 PROCEDURE — 258N000003 HC RX IP 258 OP 636: Performed by: INTERNAL MEDICINE

## 2022-10-30 PROCEDURE — 999N000248 HC STATISTIC IV INSERT WITH US BY RN

## 2022-10-30 PROCEDURE — 250N000011 HC RX IP 250 OP 636: Performed by: STUDENT IN AN ORGANIZED HEALTH CARE EDUCATION/TRAINING PROGRAM

## 2022-10-30 PROCEDURE — 83735 ASSAY OF MAGNESIUM: CPT | Performed by: INTERNAL MEDICINE

## 2022-10-30 PROCEDURE — 97530 THERAPEUTIC ACTIVITIES: CPT | Mod: GP

## 2022-10-30 PROCEDURE — 80053 COMPREHEN METABOLIC PANEL: CPT | Performed by: STUDENT IN AN ORGANIZED HEALTH CARE EDUCATION/TRAINING PROGRAM

## 2022-10-30 PROCEDURE — 214N000001 HC R&B CCU UMMC

## 2022-10-30 PROCEDURE — 250N000013 HC RX MED GY IP 250 OP 250 PS 637: Performed by: INTERNAL MEDICINE

## 2022-10-30 PROCEDURE — 97161 PT EVAL LOW COMPLEX 20 MIN: CPT | Mod: GP

## 2022-10-30 PROCEDURE — 250N000011 HC RX IP 250 OP 636: Performed by: INTERNAL MEDICINE

## 2022-10-30 PROCEDURE — 80202 ASSAY OF VANCOMYCIN: CPT | Performed by: INTERNAL MEDICINE

## 2022-10-30 PROCEDURE — 85610 PROTHROMBIN TIME: CPT

## 2022-10-30 PROCEDURE — 36415 COLL VENOUS BLD VENIPUNCTURE: CPT | Performed by: INTERNAL MEDICINE

## 2022-10-30 PROCEDURE — 82310 ASSAY OF CALCIUM: CPT | Performed by: STUDENT IN AN ORGANIZED HEALTH CARE EDUCATION/TRAINING PROGRAM

## 2022-10-30 PROCEDURE — 97116 GAIT TRAINING THERAPY: CPT | Mod: GP

## 2022-10-30 PROCEDURE — 36415 COLL VENOUS BLD VENIPUNCTURE: CPT | Performed by: STUDENT IN AN ORGANIZED HEALTH CARE EDUCATION/TRAINING PROGRAM

## 2022-10-30 PROCEDURE — 250N000013 HC RX MED GY IP 250 OP 250 PS 637: Performed by: STUDENT IN AN ORGANIZED HEALTH CARE EDUCATION/TRAINING PROGRAM

## 2022-10-30 RX ORDER — WARFARIN SODIUM 1 MG/1
2 TABLET ORAL
Status: COMPLETED | OUTPATIENT
Start: 2022-10-30 | End: 2022-10-30

## 2022-10-30 RX ORDER — HYDROXYZINE HYDROCHLORIDE 25 MG/1
25 TABLET, FILM COATED ORAL EVERY 6 HOURS PRN
Status: DISCONTINUED | OUTPATIENT
Start: 2022-10-30 | End: 2022-11-03 | Stop reason: HOSPADM

## 2022-10-30 RX ORDER — HYDROXYZINE HYDROCHLORIDE 50 MG/1
50 TABLET, FILM COATED ORAL EVERY 6 HOURS PRN
Status: DISCONTINUED | OUTPATIENT
Start: 2022-10-30 | End: 2022-11-03 | Stop reason: HOSPADM

## 2022-10-30 RX ORDER — FUROSEMIDE 10 MG/ML
40 INJECTION INTRAMUSCULAR; INTRAVENOUS ONCE
Status: DISCONTINUED | OUTPATIENT
Start: 2022-10-30 | End: 2022-10-30

## 2022-10-30 RX ORDER — FUROSEMIDE 10 MG/ML
20 INJECTION INTRAMUSCULAR; INTRAVENOUS ONCE
Status: COMPLETED | OUTPATIENT
Start: 2022-10-30 | End: 2022-10-30

## 2022-10-30 RX ADMIN — ACETAMINOPHEN 650 MG: 325 TABLET, FILM COATED ORAL at 08:13

## 2022-10-30 RX ADMIN — FAMOTIDINE 20 MG: 20 TABLET ORAL at 19:53

## 2022-10-30 RX ADMIN — HYDROXYCHLOROQUINE SULFATE 200 MG: 200 TABLET, FILM COATED ORAL at 19:53

## 2022-10-30 RX ADMIN — TAMSULOSIN HYDROCHLORIDE 0.4 MG: 0.4 CAPSULE ORAL at 08:13

## 2022-10-30 RX ADMIN — ATORVASTATIN CALCIUM 40 MG: 40 TABLET, FILM COATED ORAL at 19:53

## 2022-10-30 RX ADMIN — FAMOTIDINE 20 MG: 20 TABLET ORAL at 08:13

## 2022-10-30 RX ADMIN — DIGOXIN 125 MCG: 125 TABLET ORAL at 08:13

## 2022-10-30 RX ADMIN — HYDROXYCHLOROQUINE SULFATE 200 MG: 200 TABLET, FILM COATED ORAL at 08:13

## 2022-10-30 RX ADMIN — ACETAMINOPHEN 650 MG: 325 TABLET, FILM COATED ORAL at 19:53

## 2022-10-30 RX ADMIN — PIPERACILLIN SODIUM AND TAZOBACTAM SODIUM 4.5 G: 4; .5 INJECTION, POWDER, LYOPHILIZED, FOR SOLUTION INTRAVENOUS at 04:44

## 2022-10-30 RX ADMIN — Medication 5 MG: at 21:10

## 2022-10-30 RX ADMIN — HYDROXYZINE HYDROCHLORIDE 25 MG: 25 TABLET, FILM COATED ORAL at 21:10

## 2022-10-30 RX ADMIN — FUROSEMIDE 20 MG: 10 INJECTION, SOLUTION INTRAVENOUS at 08:14

## 2022-10-30 RX ADMIN — VANCOMYCIN HYDROCHLORIDE 1250 MG: 10 INJECTION, POWDER, LYOPHILIZED, FOR SOLUTION INTRAVENOUS at 10:51

## 2022-10-30 RX ADMIN — PIPERACILLIN SODIUM AND TAZOBACTAM SODIUM 4.5 G: 4; .5 INJECTION, POWDER, LYOPHILIZED, FOR SOLUTION INTRAVENOUS at 09:44

## 2022-10-30 RX ADMIN — WARFARIN SODIUM 2 MG: 1 TABLET ORAL at 18:51

## 2022-10-30 RX ADMIN — VANCOMYCIN HYDROCHLORIDE 125 MG: 125 CAPSULE ORAL at 09:43

## 2022-10-30 ASSESSMENT — ACTIVITIES OF DAILY LIVING (ADL)
ADLS_ACUITY_SCORE: 38
ADLS_ACUITY_SCORE: 38
ADLS_ACUITY_SCORE: 34
ADLS_ACUITY_SCORE: 38
ADLS_ACUITY_SCORE: 38
ADLS_ACUITY_SCORE: 34

## 2022-10-30 NOTE — PHARMACY-VANCOMYCIN DOSING SERVICE
Pharmacy Vancomycin Note  Date of Service 2022  Patient's  1960   62 year old, male    Indication: Skin and Soft Tissue Infection  Day of Therapy: 3  Current vancomycin regimen:  750 mg IV q12h  Current vancomycin monitoring method: AUC  Current vancomycin therapeutic monitoring goal: 400-600 mg*h/L    InsightRX Prediction of Current Vancomycin Regimen  Loading dose: N/A  Regimen: 750 mg IV every 12 hours.  Start time: 10:31 on 10/30/2022  Exposure target: AUC24 (range)400-600 mg/L.hr   AUC24,ss: 579 mg/L.hr  Probability of AUC24 > 400: 98 %  Ctrough,ss: 18.9 mg/L  Probability of Ctrough,ss > 20: 41 %  Probability of nephrotoxicity (Lodise TAD ): 15 %    Current estimated CrCl = Estimated Creatinine Clearance: 78.2 mL/min (based on SCr of 0.87 mg/dL).    Creatinine for last 3 days  10/27/2022: 10:12 PM Creatinine 0.72 mg/dL  10/28/2022:  8:52 AM Creatinine 0.76 mg/dL  10/29/2022:  6:15 AM Creatinine 0.86 mg/dL  10/30/2022:  6:53 AM Creatinine 0.87 mg/dL    Recent Vancomycin Levels (past 3 days)  10/30/2022:  6:53 AM Vancomycin 23.3 ug/mL    Vancomycin IV Administrations (past 72 hours)                   vancomycin (VANCOCIN) 750 mg in sodium chloride 0.9 % 250 mL intermittent infusion (mg) 750 mg New Bag 10/29/22 2231    vancomycin (VANCOCIN) 1000 mg in dextrose 5% 200 mL PREMIX (mg) 1,000 mg New Bag 10/29/22 1120     1,000 mg New Bag 10/28/22 2309     1,000 mg New Bag  1328                Nephrotoxins and other renal medications (From now, onward)    Start     Dose/Rate Route Frequency Ordered Stop    10/30/22 0800  vancomycin (VANCOCIN) capsule 125 mg         125 mg Oral DAILY 10/29/22 1828      10/29/22 223  vancomycin (VANCOCIN) 750 mg in sodium chloride 0.9 % 250 mL intermittent infusion         750 mg  over 60-90 Minutes Intravenous EVERY 12 HOURS 10/29/22 1312      10/28/22 2200  piperacillin-tazobactam (ZOSYN) 4.5 g vial to attach to  mL bag        Note to Pharmacy: For [facility]ANNY,  "[facility] and [facility]: For Zosyn-naive patients, use the \"Zosyn initial dose + extended infusion\" order panel.    4.5 g  over 30 Minutes Intravenous EVERY 6 HOURS 10/28/22 1912               Contrast Orders - past 72 hours (72h ago, onward)    None          Interpretation of levels and current regimen:  Vancomycin level is reflective of -600    Has serum creatinine changed greater than 50% in last 72 hours: No    Urine output:  good urine output    Renal Function: Stable    InsightRX Prediction of Planned New Vancomycin Regimen  Loading dose: N/A  Regimen: 1250 mg IV every 24 hours.  Start time: 10:31 on 10/30/2022  Exposure target: AUC24 (range)400-600 mg/L.hr   AUC24,ss: 493 mg/L.hr  Probability of AUC24 > 400: 88 %  Ctrough,ss: 13.3 mg/L  Probability of Ctrough,ss > 20: 7 %  Probability of nephrotoxicity (Lodise TAD 2009): 8 %    Plan:  1. Decrease Dose to 1250mg q24h  2. Vancomycin monitoring method: AUC  3. Vancomycin therapeutic monitoring goal: 400-600 mg*h/L  4. Pharmacy will check vancomycin levels as appropriate in 1-3 Days.  5. Serum creatinine levels will be ordered daily for the first week of therapy and at least twice weekly for subsequent weeks.    Mracos Taylor [facility]  "

## 2022-10-30 NOTE — PROGRESS NOTES
[facility]    History and Physical - Cardiology 2 Heart Failure Service       Date of Admission:  10/27/2022    Assessment & Plan      Dandy Sands is a 62 year old male PMH of SLE, CAD, HFrEF secondary to ischemic cardiomyopathy s/p LVAD implantation and recent Covid infection admitted on 10/27/2022 who presents with nausea, vomiting and a purulent drainage from his LVAD driveline site.    Patient remains inpatient for ongoing management and work-up of drive line infection.    Changes  - Continue Vanc for Corynebacterium Wound infection   - Discontinued Zosyn  - Cdiff colonization, added prophylactic oral vancomycin   - CVTS consulted, no indication for I&D  - Gentle diuresis with IV Lasix 20    #Drive line infection  #Stage D HFrEF 2/2 ICM (EF 10-15%) s/p HM3 LVAD (7/28/22) and CRT-D ('06)  #CAD s/p PCI to LAD ('05)  The patient presents with fever, elevated CRP, weakness, loss of appetite and difficulty tolerating oral intake. Family noted purulence while changing the LVAD site dressing two days ago. Doxycycline started with minimal symptom relief. Admission CT CAP reveals persistent mediastinal mass with extensive inflammation around the driveline. Of note, patient with history of ICM with EF of 10-15% and underwent a high risk multivessel PCI in June after which he went into cardiogenic shock requiring LVAD support and temporary RVAD. No LVAD alarms.   - LVAD: LVAD speed 5100 (reduced from 5200 9/19/2022)  - Volume status: Euvolemic  - Dry weight: 134  - Weight on admission: 140  - GDMT              > BB: discontinued during a previous admission, continue to hold              > ACEi/ARB/ARNi: held pta lisinopril 10 mg daily              > MRA: None              > SGLT2i: None   - SCD ppx: s/p CRT-D (2006)  - Continue Warfarin  - Continue pta digoxin 125 mcg daily  - Continue pta atorvastatin 40 mg daily  - Continue daily standing weights  - Continue Vanc, d/c  Zosyn (10/28-10/30)  - Infectious disease consulted, appreciate recs  - CVTS consulted, appreciate recs    Culture Results:  - 10/28 MRSA Nares Negative  - 10/28 Aerobic Wound culture 2+ Corynebacterium  - 10/28 Urine culture <50k Proteus  - 10/27 Blood culture NGTD  - 10/27 Blood culture: NGTD  - 10/26 Blood culture NGTD  - 10/26 Blood culture: NGTD  - 10/26 Blood culture: NGTD  - 10/26 Anaerobic wound culture: NGTD  - 10/26 Aerobic wound culture 1+ Corynebacterium    #Diarrhea  #Recent C Diff Infection  The patient was recently diagnosed with C Diff and has completed treatment with oral vancomycin. Currently he has watery diarrhea, onset two days ago w/o cramping.   - C.diff and enteric stool panel consistent with colonization  - Added PO Vancomycin 125 daily as prophylaxis  - Hold PTA loperamide    #SLE  #Antiphospholipid syndrome   Follows with rheumatology as outpatient, recent 10/25/22 evaluation notable for no evidence of active autoimmunity or systemic inflammatory disease. Cellcept was discontinued and the patient was to stay only on hydroxychloroquine. However, due to recurrent hospitalizations the patient had minimal exposure to cellcept and this does not represent an abrupt cessation, therefore association between high inflammatory state and autoimmunity is trivial.  - Continue PTA hydroxychloroquine    #GERD  - Continue PTA famotidine    #Hyperlipidemia  - Continue PTA atorvastatin    #Depression  #MIGUEL  - Holding PTA sertraline due to worsening diarrhea     #BPH  - Continue pta tamsulosin     Diet: Fluid restriction 2000 ML FLUID  2 Gram Sodium DietCardiac  DVT Prophylaxis: Warfarin  Fitzgerald Catheter: Not present  Fluids: None  Central Lines: None  Cardiac Monitoring: ACTIVE order. Indication: Infective endocarditis (48 hours) - additional guidance recommending until clinically stable  Code Status: Full Code Full    Patient discussed with Cards2 staff.    Behzad Zeng MD  Internal Medicine,  PGY-2  [facility]  [phone]  Securely message with the [employer] Web Console (learn more here)  Text page via [employer] Paging/Directory   ______________________________________________________________________    Interval History    NAEON. Previous notes reviewed. Patient notes significant improvement in nausea/vomiting and diarrhea since admission. No other acute complaints. He notes chronic     Physical Exam   Vital Signs: Temp: 97.5  F (36.4  C) Temp src: Oral BP: (!) 82/76 Pulse: 83   Resp: 16 SpO2: 96 %      Weight: 136 lbs 11.2 oz    General Appearance: No apparent distress  Eyes: PERRLA, EOMI   HEENT: NCAT, OP, NP mucosae are dry  Respiratory: CTAB, no crackles, rales or wheezing   Cardiovascular: LVAD hum appreciated, tachycardic, S1, S2, no mrg, no EDMAR.  GI: soft, not distended, diffusely tender to light palpation, bowel sounds (+), drive line site without clean, dry and without drainage when seen today.  Skin: No rashes  Neurologic: AAOx3, moves freely all extremities, symmetrically weak  Psychiatric: Fluent speech, fatigued but responsive to questions    Data   Data reviewed today: I reviewed all medications, new labs and imaging results over the last 24 hours.     Recent Labs   Lab 10/29/22  0615 10/28/22  0852 10/28/22  0533 10/27/22  2212   WBC 12.6* 15.3*  --  13.1*   HGB 8.7* 9.3*  --  9.8*   MCV 92 94  --  94    264  --  247   INR 2.74*  --  2.30* 2.38*   * 132*  --  134*   POTASSIUM 3.9 4.6  --  4.2   CHLORIDE 94* 100  --  101   CO2 21* 19*  --  20*   BUN 11.4 11.1  --  13.9   CR 0.86 0.76  --  0.72   ANIONGAP 12 13  --  13   ELDA 8.5* 9.2  --  8.9   GLC 95 88  --  100*   ALBUMIN 3.2* 3.2*  --  3.6   PROTTOTAL 6.9 7.1  --  7.5   BILITOTAL 0.7 0.7  --  0.4   ALKPHOS 106 108  --  124   ALT 6* 10  --  11   AST 18 26  --  21

## 2022-10-30 NOTE — PLAN OF CARE
Status [id number]:     Neuro: A&O x4, no complaints of dizziness.  Respiratory: LL bases diminished with fine crackles. All other lungs sounds clear/diminished. Reports some SOB/fatigue with activity.   Cardiac: Rhythm sinus dysrhythmia, 1st degree block and BBB. Tachycardic. PI's often between 6-7, Cards aware. Denies chest pain. No alarms on LVAD. 1x dose 20mg Lasix given with good urine output. Pt reported to Card 2 he has lower chest pain with deep breathing, states this has been happening for about 3 weeks. Continue to monitor.  GI/: Complaints of nausea at the beginning of the shift when sitting at the edge of the bed. No order for antiemetics due to prolonged QT. Cold sips of water given and encouraged to lay down, pt reported relief after eating breakfast.   Skin: See driveline dressing flowsheet.  VS: Afebrile this shift. VSS. Doppler MAPs 82-84.  LDA: LPIV SL. Previous RPIV site WNL based on extravasation protocol.     Labs: Electrolytes WNL. LVAD adjusted to current hematocrit level. INR therapeutic. Magnesium add-on to afternoon draw, in process at this time.  Pain: Reports chronic aching back pain. Relief with PRN tylenol.  Mobility: Ambulating SBA. Walked in hallways with PT today.     Plan: IV zosyn d/c'd. Continue IV and PO vanco. Monitor driveline dressing and LVAD numbers. Trend electrolytes.

## 2022-10-30 NOTE — PLAN OF CARE
D: Patient admitted 10/27 with nausea, vomiting and a purulent drainage from his LVAD driveline site. Hx. SLE, CAD, HFrEF secondary to ischemic cardiomyopathy s/p LVAD implantation and recent Covid infection.  I/A: A&Ox4. VSSA (doppler MAPs 82, 78, 88) on RA. CASEY. SR/ST 90s-100s. LVAD #s wnl, no alarms. C/o generalized joint pains partially relieved by prn tylenol. Abx infused per orders. *Pt awake this AM w/ R PIV pink/tender and leaking (tko), line previously infusing vanco, extravasation protocol initiated, site marked/monitored, picture documented, and MD notified. Adequate uop. SBA. Appeared to rest comfortably overnight.   P: Continue to monitor and notify Cards 2 with questions/concerns.

## 2022-10-30 NOTE — PROGRESS NOTES
"   10/30/22 1045   Appointment Info   Signing Clinician's Name / Credentials (PT) Shante Prado DPT       Present no   Living Environment   People in Home alone   Current Living Arrangements house   Home Accessibility stairs to enter home   Number of Stairs, Main Entrance 3   Stair Railings, Main Entrance none   Transportation Anticipated family or friend will provide   Living Environment Comments PT: Pt has been staying at [facility] for 5 months post receiving LVAD in July 2022. Pt reports plan to return to [place] on Nov 7th. Looking into discharging straight to an apartment where he will have no stairs, but if unable to obtain prior will return to his home which has 3STE.   Self-Care   Usual Activity Tolerance moderate   Current Activity Tolerance moderate   Regular Exercise Yes   Activity/Exercise Type other (see comments)  ([facility]ep at [facility])   Exercise Amount/Frequency daily   Equipment Currently Used at Home walker, standard   Fall history within last six months no   Activity/Exercise/Self-Care Comment Pt reports IND/ mod I w/ functional mobility. Reports usually does not use FWW in the aparmtent (\"when son isn't looking\") but will when he walks to his appointments. Reports will be able to walk ~1 block prior to needing to rest. Has been using a Nustep bike at [facility] consistently. Has not been to [facility] or have HH PT lately.   General Information   Onset of Illness/Injury or Date of Surgery 10/27/22   Referring Physician Behzad Zeng MD   Patient/Family Therapy Goals Statement (PT) to return home to [place],  remain active at home   Pertinent History of Current Problem (include personal factors and/or comorbidities that impact the POC) Dandy Sands is a 62 year old male who is s/p HM3 implant in July 2022 with a prolonged subsequent hospital course.  He was admitted 10/27/2022 with concern for driveline infection for which CVTS was consulted in addition to incidental mediastinal " fluid collection as seen on CT.   Existing Precautions/Restrictions fall;cardiac  (LVAD)   Heart Disease Risk Factors Age;Gender;Medical history   General Observations Activity orders: Ambulate. LVAD #'s WNL   Cognition   Affect/Mental Status (Cognition) WNL   Orientation Status (Cognition) oriented x 4   Follows Commands (Cognition) WNL   Safety Deficit (Cognition) minimal deficit;safety precautions follow-through/compliance   Pain Assessment   Patient Currently in Pain No   Integumentary/Edema   Integumentary/Edema no deficits were identifed   Posture    Posture Not impaired;Forward head position   Range of Motion (ROM)   Range of Motion ROM is WNL   Strength (Manual Muscle Testing)   Strength (Manual Muscle Testing) strength is WFL   Strength Comments mild deconditioning w/ proximal weakness but >3/5 for functional mobility   Bed Mobility   Comment, (Bed Mobility) IND   Transfers   Comment, (Transfers) IND   Gait/Stairs (Locomotion)   Comment, (Gait/Stairs) mod I w/ FWW   Balance   Balance Comments fair dynamic balance needing SBA w/ higher level tasks when not using FWW   Sensory Examination   Sensory Perception patient reports no sensory changes   Coordination   Coordination no deficits were identified   Muscle Tone   Muscle Tone no deficits were identified   Clinical Impression   Criteria for Skilled Therapeutic Intervention Yes, treatment indicated   PT Diagnosis (PT) impaired functional mobility   Influenced by the following impairments activity tolerance, functional strength, balance   Functional limitations due to impairments decreased (I) w/ gait, ADL tolerance   Clinical Presentation (PT Evaluation Complexity) Stable/Uncomplicated   Clinical Presentation Rationale PMH/comorbidities, clinical judgement, home set up   Clinical Decision Making (Complexity) low complexity   Planned Therapy Interventions (PT) balance training;gait training;patient/family education;strengthening;stair training;progressive  activity/exercise;risk factor education   Anticipated Equipment Needs at Discharge (PT)   (none, has FWW)   Risk & Benefits of therapy have been explained evaluation/treatment results reviewed;care plan/treatment goals reviewed;risks/benefits reviewed;participants voiced agreement with care plan;participants included;current/potential barriers reviewed;patient   Clinical Impression Comments PT: Pt presents w/ fair activity tolerance s/p LVAD 4 mo ago, decreased functional balance w/ gait and stairs. Will benefit from skilled PT to progress functional IND and ensure safe home discharge.   PT Total Evaluation Time   PT Eval, Low Complexity Minutes (58029) 14   Plan of Care Review   Plan of Care Reviewed With patient   Physical Therapy Goals   PT Frequency 4x/week   PT Predicted Duration/Target Date for Goal Attainment 11/02/22   PT Goals Gait;Stairs;PT Goal 1;Aerobic Activity   PT: Gait Greater than 200 feet;Assistive device;Modified independent   PT: Stairs Modified independent;3 stairs;Assistive device   PT: Perform aerobic activity with stable cardiovascular response continuous activity;20 minutes;NuStep   PT: Goal 1 Demonstrate low falls risk with a balance assessment score of < 13.5 seconds on TUG,  > 19/24 on Dynamic Gait Index, > 9/12 on 4-Item Dynamic Gait Index, >22/30 on Functional Gait Assessment, or > 45/56 on David Balance Assessment.

## 2022-10-31 ENCOUNTER — HOME INFUSION (PRE-WILLOW HOME INFUSION) (OUTPATIENT)
Dept: PHARMACY | Facility: CLINIC | Age: 62
End: 2022-10-31

## 2022-10-31 ENCOUNTER — APPOINTMENT (OUTPATIENT)
Dept: EDUCATION SERVICES | Facility: CLINIC | Age: 62
DRG: 314 | End: 2022-10-31
Payer: COMMERCIAL

## 2022-10-31 ENCOUNTER — APPOINTMENT (OUTPATIENT)
Dept: PHYSICAL THERAPY | Facility: CLINIC | Age: 62
DRG: 314 | End: 2022-10-31
Payer: COMMERCIAL

## 2022-10-31 DIAGNOSIS — T82.7XXA INFECTION ASSOCIATED WITH DRIVELINE OF LEFT VENTRICULAR ASSIST DEVICE (LVAD) (H): ICD-10-CM

## 2022-10-31 DIAGNOSIS — Z95.811 LVAD (LEFT VENTRICULAR ASSIST DEVICE) PRESENT (H): Primary | ICD-10-CM

## 2022-10-31 LAB
ALBUMIN SERPL BCG-MCNC: 3.2 G/DL (ref 3.5–5.2)
ALP SERPL-CCNC: 122 U/L (ref 40–129)
ALT SERPL W P-5'-P-CCNC: 7 U/L (ref 10–50)
ANION GAP SERPL CALCULATED.3IONS-SCNC: 11 MMOL/L (ref 7–15)
AST SERPL W P-5'-P-CCNC: 17 U/L (ref 10–50)
BACTERIA BLD CULT: NO GROWTH
BACTERIA BLD CULT: NO GROWTH
BASOPHILS # BLD AUTO: 0 10E3/UL (ref 0–0.2)
BASOPHILS NFR BLD AUTO: 1 %
BILIRUB SERPL-MCNC: 0.4 MG/DL
BUN SERPL-MCNC: 13.1 MG/DL (ref 8–23)
CALCIUM SERPL-MCNC: 8.8 MG/DL (ref 8.8–10.2)
CHLORIDE SERPL-SCNC: 104 MMOL/L (ref 98–107)
CREAT SERPL-MCNC: 0.71 MG/DL (ref 0.67–1.17)
DEPRECATED HCO3 PLAS-SCNC: 21 MMOL/L (ref 22–29)
EOSINOPHIL # BLD AUTO: 0.2 10E3/UL (ref 0–0.7)
EOSINOPHIL NFR BLD AUTO: 3 %
ERYTHROCYTE [DISTWIDTH] IN BLOOD BY AUTOMATED COUNT: 15.4 % (ref 10–15)
GFR SERPL CREATININE-BSD FRML MDRD: >90 ML/MIN/1.73M2
GLUCOSE SERPL-MCNC: 82 MG/DL (ref 70–99)
HCT VFR BLD AUTO: 27.1 % (ref 40–53)
HGB BLD-MCNC: 8.6 G/DL (ref 13.3–17.7)
IMM GRANULOCYTES # BLD: 0 10E3/UL
IMM GRANULOCYTES NFR BLD: 0 %
INR PPP: 2.66 (ref 0.85–1.15)
LYMPHOCYTES # BLD AUTO: 0.8 10E3/UL (ref 0.8–5.3)
LYMPHOCYTES NFR BLD AUTO: 14 %
MAGNESIUM SERPL-MCNC: 1.9 MG/DL (ref 1.7–2.3)
MCH RBC QN AUTO: 29.3 PG (ref 26.5–33)
MCHC RBC AUTO-ENTMCNC: 31.7 G/DL (ref 31.5–36.5)
MCV RBC AUTO: 92 FL (ref 78–100)
MONOCYTES # BLD AUTO: 0.5 10E3/UL (ref 0–1.3)
MONOCYTES NFR BLD AUTO: 8 %
NEUTROPHILS # BLD AUTO: 4.4 10E3/UL (ref 1.6–8.3)
NEUTROPHILS NFR BLD AUTO: 74 %
NRBC # BLD AUTO: 0 10E3/UL
NRBC BLD AUTO-RTO: 0 /100
PLATELET # BLD AUTO: 223 10E3/UL (ref 150–450)
POTASSIUM SERPL-SCNC: 3.7 MMOL/L (ref 3.4–5.3)
PROT SERPL-MCNC: 6.9 G/DL (ref 6.4–8.3)
RBC # BLD AUTO: 2.94 10E6/UL (ref 4.4–5.9)
SODIUM SERPL-SCNC: 136 MMOL/L (ref 136–145)
WBC # BLD AUTO: 5.9 10E3/UL (ref 4–11)

## 2022-10-31 PROCEDURE — 83735 ASSAY OF MAGNESIUM: CPT | Performed by: INTERNAL MEDICINE

## 2022-10-31 PROCEDURE — 99232 SBSQ HOSP IP/OBS MODERATE 35: CPT | Mod: GC | Performed by: INTERNAL MEDICINE

## 2022-10-31 PROCEDURE — 97530 THERAPEUTIC ACTIVITIES: CPT | Mod: GP | Performed by: REHABILITATION PRACTITIONER

## 2022-10-31 PROCEDURE — 258N000003 HC RX IP 258 OP 636: Performed by: INTERNAL MEDICINE

## 2022-10-31 PROCEDURE — 250N000011 HC RX IP 250 OP 636: Performed by: INTERNAL MEDICINE

## 2022-10-31 PROCEDURE — 250N000013 HC RX MED GY IP 250 OP 250 PS 637: Performed by: STUDENT IN AN ORGANIZED HEALTH CARE EDUCATION/TRAINING PROGRAM

## 2022-10-31 PROCEDURE — 250N000011 HC RX IP 250 OP 636

## 2022-10-31 PROCEDURE — 85025 COMPLETE CBC W/AUTO DIFF WBC: CPT | Performed by: STUDENT IN AN ORGANIZED HEALTH CARE EDUCATION/TRAINING PROGRAM

## 2022-10-31 PROCEDURE — 36415 COLL VENOUS BLD VENIPUNCTURE: CPT

## 2022-10-31 PROCEDURE — 250N000013 HC RX MED GY IP 250 OP 250 PS 637: Performed by: INTERNAL MEDICINE

## 2022-10-31 PROCEDURE — 85610 PROTHROMBIN TIME: CPT

## 2022-10-31 PROCEDURE — 80053 COMPREHEN METABOLIC PANEL: CPT | Performed by: STUDENT IN AN ORGANIZED HEALTH CARE EDUCATION/TRAINING PROGRAM

## 2022-10-31 PROCEDURE — 214N000001 HC R&B CCU UMMC

## 2022-10-31 PROCEDURE — 82040 ASSAY OF SERUM ALBUMIN: CPT | Performed by: STUDENT IN AN ORGANIZED HEALTH CARE EDUCATION/TRAINING PROGRAM

## 2022-10-31 PROCEDURE — 99233 SBSQ HOSP IP/OBS HIGH 50: CPT | Mod: GC | Performed by: INTERNAL MEDICINE

## 2022-10-31 PROCEDURE — 250N000013 HC RX MED GY IP 250 OP 250 PS 637

## 2022-10-31 RX ORDER — HYDRALAZINE HYDROCHLORIDE 25 MG/1
25 TABLET, FILM COATED ORAL 3 TIMES DAILY
Status: DISCONTINUED | OUTPATIENT
Start: 2022-10-31 | End: 2022-11-03 | Stop reason: HOSPADM

## 2022-10-31 RX ORDER — WARFARIN SODIUM 4 MG/1
4 TABLET ORAL
Status: COMPLETED | OUTPATIENT
Start: 2022-10-31 | End: 2022-10-31

## 2022-10-31 RX ORDER — HYDRALAZINE HYDROCHLORIDE 20 MG/ML
10 INJECTION INTRAMUSCULAR; INTRAVENOUS EVERY 6 HOURS PRN
Status: DISCONTINUED | OUTPATIENT
Start: 2022-10-31 | End: 2022-11-03 | Stop reason: HOSPADM

## 2022-10-31 RX ORDER — LIDOCAINE 40 MG/G
CREAM TOPICAL
Status: ACTIVE | OUTPATIENT
Start: 2022-10-31 | End: 2022-11-03

## 2022-10-31 RX ADMIN — HYDRALAZINE HYDROCHLORIDE 25 MG: 25 TABLET, FILM COATED ORAL at 08:27

## 2022-10-31 RX ADMIN — ATORVASTATIN CALCIUM 40 MG: 40 TABLET, FILM COATED ORAL at 20:15

## 2022-10-31 RX ADMIN — ACETAMINOPHEN 650 MG: 325 TABLET, FILM COATED ORAL at 20:24

## 2022-10-31 RX ADMIN — TAMSULOSIN HYDROCHLORIDE 0.4 MG: 0.4 CAPSULE ORAL at 08:27

## 2022-10-31 RX ADMIN — DIGOXIN 125 MCG: 125 TABLET ORAL at 08:27

## 2022-10-31 RX ADMIN — Medication 5 MG: at 20:16

## 2022-10-31 RX ADMIN — HYDRALAZINE HYDROCHLORIDE 25 MG: 25 TABLET, FILM COATED ORAL at 20:15

## 2022-10-31 RX ADMIN — HYDROXYZINE HYDROCHLORIDE 50 MG: 50 TABLET ORAL at 20:15

## 2022-10-31 RX ADMIN — FAMOTIDINE 20 MG: 20 TABLET ORAL at 08:27

## 2022-10-31 RX ADMIN — WARFARIN SODIUM 4 MG: 4 TABLET ORAL at 18:22

## 2022-10-31 RX ADMIN — HYDROXYCHLOROQUINE SULFATE 200 MG: 200 TABLET, FILM COATED ORAL at 08:27

## 2022-10-31 RX ADMIN — ACETAMINOPHEN 650 MG: 325 TABLET, FILM COATED ORAL at 04:32

## 2022-10-31 RX ADMIN — FAMOTIDINE 20 MG: 20 TABLET ORAL at 20:16

## 2022-10-31 RX ADMIN — VANCOMYCIN HYDROCHLORIDE 125 MG: 125 CAPSULE ORAL at 08:27

## 2022-10-31 RX ADMIN — HYDRALAZINE HYDROCHLORIDE 25 MG: 25 TABLET, FILM COATED ORAL at 15:23

## 2022-10-31 RX ADMIN — HYDROXYCHLOROQUINE SULFATE 200 MG: 200 TABLET, FILM COATED ORAL at 20:15

## 2022-10-31 RX ADMIN — VANCOMYCIN HYDROCHLORIDE 1250 MG: 10 INJECTION, POWDER, LYOPHILIZED, FOR SOLUTION INTRAVENOUS at 11:36

## 2022-10-31 RX ADMIN — HYDRALAZINE HYDROCHLORIDE 10 MG: 20 INJECTION INTRAMUSCULAR; INTRAVENOUS at 00:39

## 2022-10-31 ASSESSMENT — ACTIVITIES OF DAILY LIVING (ADL)
ADLS_ACUITY_SCORE: 38
ADLS_ACUITY_SCORE: 38
ADLS_ACUITY_SCORE: 40
ADLS_ACUITY_SCORE: 38
ADLS_ACUITY_SCORE: 40
ADLS_ACUITY_SCORE: 38

## 2022-10-31 NOTE — PLAN OF CARE
D: Patient admitted 10/27 with nausea, vomiting and a purulent drainage from his LVAD driveline site. Hx. SLE, CAD, HFrEF secondary to ischemic cardiomyopathy s/p LVAD implantation and recent Covid infection.  I/A: A&Ox4. VSSA (except: doppler MAP elevated overnight, LVAD PIs 8-10, flow 2.6-3s, prn hydral given with resolve to baseline) on RA. CASEY. SR/ST (w/first degree AVB and BBB) 80s-100s. LVAD #s wnl (except episodic x1 elevated PIs and lower flows overnight, see provider notification note), no alarms. C/o generalized joint pains partially relieved by prn tylenol. Adequate uop. SBA. Appeared to rest comfortably overnight.   P: Continue to monitor and notify Cards 2 with questions/concerns.

## 2022-10-31 NOTE — PLAN OF CARE
D: Admitted 10/27 who presents with nausea, vomiting and a purulent drainage from his LVAD driveline site.     I: Monitored vitals and assessed pt status.   Tele: Sinus rhythm, HR 80-90's   Mobility: SBA    A: AOx4. VSS. Afebrile. Urinating adequately. LBM today. LVAD dressing change completed. PLC education for PICC and IV vanco home infusion completed today, however this was done prior to pt even having PICC placed and family requesting to be present for teaching as they will be assisting him back home in the beginning. Another consult for PLC was placed.      P: Continue to monitor pt status and report changes to Cards 2. PICC to be placed in the morning.

## 2022-10-31 NOTE — PLAN OF CARE
Goal Outcome Evaluation:      Plan of Care Reviewed With: patient    Overall Patient Progress: no changeOverall Patient Progress: no change    Outcome Evaluation: Encourage intake at meals, esepcially of foods he tolerates from a GI perspective. Encourage intake of snacks. Rec liberalize diet order to optimize oral intake and prevent further unintentional wt loss.

## 2022-10-31 NOTE — PROVIDER NOTIFICATION
LVAD PIs trending up 8s-10s, flows 2.6-3s, other LVAD parameters wnl. Doppler MAP 98. Cards CC notified. PRN hydral 10 mg ordered.

## 2022-10-31 NOTE — PROGRESS NOTES
"CLINICAL NUTRITION SERVICES - ASSESSMENT NOTE     Nutrition Prescription    RECOMMENDATIONS FOR MDs/PROVIDERS TO ORDER:  1. lLiberalize to a 3g sodium vs regular diet to help encourage oral intake  2. Monitor lytes (Phos, Mg++, and K+) for refeeding syndrome. Negative urinary ketones per urinalysis on 10/28. If lytes trend low, aggressively replace. K+ and Mg++ WNL today (10/31) but no recent phos lab. Ensure the lyte Replacement ADULT order set/s is/are implemented and select the option for high replacement.      Malnutrition Status:    Severe malnutrition in the context of chronic illness    Recommendations already ordered by Registered Dietitian (RD):  Snacks    Future/Additional Recommendations:  1. Encourage intake of tolerated foods/beverages which do not exacerbate his loose stools.   2. Monitor stools for steatorrhea, when appropriate.   3. Continue fluid restriction as per team.    4. Order a multivitamin with minerals, if inadequate oral intake, to help meet micronutrient needs.      REASON FOR ASSESSMENT  Dandy Sands is a/an 62 year old male assessed by the dietitian for Admission Nutrition Risk Screen for malnutrition score of two or greater. Have you recently lost weight without trying?: yes.Have you been eating poorly because of a decreased appetite?: no    NUTRITION/ADDITIONAL HISTORY  Per RD note 10/16/2022: \"Per visit with patient:  - Primary complaint is lack of variety of food choices with 2gm Na restriction - for example, wanted two dinner rolls with meal and only allowed one  - Did not order breakfast and not excited about lunch because of diet restriction  - Appetite improved.  Last episode of emesis 10/15.  stooling less frequently - was stooling every hour previously.  - Not interested in special K bars.  Discussed alternative supplements and pt interested in gelatein  Diet: 2 g Sodium 2000ml FR -- > changed to regular. Recent admission 9/18 - 9/29: ID and GI consulted, started on " "methylcellulose.  Per RD note, pt on liberal diet,  accepting of special K bars BID, had been avoiding dairy due to loose stools, and was consuming % meals.\"  Per H & P, \"PMH of SLE, CAD, HFrEF secondary to ischemic cardiomyopathy s/p LVAD implantation and recent Covid infection admitted on 10/27/2022 who presents with nausea, vomiting and a purulent drainage from his LVAD driveline site. The patient presents with fever, elevated CRP, weakness, loss of appetite and difficulty tolerating oral intake. The patient was recently diagnosed with C Diff and has completed treatment with oral vancomycin. Currently he has watery diarrhea, onset two days ago w/o cramping.\" Pt was ordered to take, noting, pepcid, imodium, citrucel, thera-vit-M, and  PTA.   Per pt, he continues to lose wt, especially while in the hospital. He attributes this to eating less in the hospital (food preferences and diet restrictions). Dislikes oral supplements, including the Gelatein he tried his most recent admission. Likes peanut butter and crackers. Does not care for the Special K bars. PTA, states his son was buying him a protein bar that contains cashews. Aware of need for adequate protein intake. Historically, periods of inadequate nutrition intake.     CURRENT NUTRITION ORDERS  Diet: 2 g Sodium and 2000 mL Fluid Restriction since 10/29  Intake/Tolerance: Good diet tolerance. Per nursing flowsheets (% intake), consuming % so far with the exception of 50% once. Per pt, his appetite has improved and he is hungry. Finds he is not able to order what he would like on the restrictive diet order. [employer] (meal ordering system) indicates food/beverages sent totaled 894 kcals and 42 g protein daily on average 10/29-10/30, inadequate.     LABS  Labs reviewed    MEDICATIONS  Medications reviewed    ANTHROPOMETRICS  Height: 170.2 cm (5' 7\")  Most Recent Weight: 61.8 kg (136 lb 4.8 oz)    IBW: 67.3 kg   BMI: Normal BMI  Wt Hx: 81.4 kg " (3/17/14), 66.8 kg (5/17/2022), 64.6 kg (5/26/2022), 62.6 kg (7/8/2022), 63.5 kg (10/27, admit) - Pt has lost 7.5% of his body wt over the last five to six months. Diuresis. Suspect actual and fluid loss. Potential variability in weights due to LVAD. Per pt, he weighed 181# (82.3 kg) about a year ago which translates to a 25% of total body wt loss.     Dosing Weight: 62 kg (based on lowest wt so far this admission of 61.8 kg on 10/31)    ASSESSED NUTRITION NEEDS (for inpatient hospital stay)  Estimated Energy Needs: 8497-6972 kcals/day (30 - 35 kcals/kg)  Justification: Increased needs for repletion  Estimated Protein Needs: 74-93 grams protein/day (1.2 - 1.5 grams of pro/kg)  Justification: Increased needs for repletion  Estimated Fluid Needs: 2000 mL/day   Justification: On a fluid restriction    PHYSICAL FINDINGS/OTHER FINDINGS  See malnutrition section below.  GI: Pt having zero to two stools daily. Last stool on 10/30. C diff + on 10/29 (GDH antigen +). Per pt, having two brown, watery stools daily.     MALNUTRITION  % Intake: < 75% for > 7 days (moderate)   % Weight Loss: > 20% in 1 year (severe) - although difficult to assess due to fluid status changes, diuresis, and variability in weights  Subcutaneous Fat Loss: Facial region and arms:  Moderate-Severe  Muscle Loss: Temporal:  Severe, Thoracic region (clavicle, acromium bone, deltoid, trapezius, pectoral):  Severe and Dorsal hand:  Moderate  Fluid Accumulation/Edema: None noted  Malnutrition Diagnosis: Severe malnutrition in the context of chronic illness    NUTRITION DIAGNOSIS  Malnutrition related to inadequate intake intermittently and increased needs as evidenced by pt with severe malnutrition due to wt loss (25% body wt over a year), fat loss, and muscle loss.       INTERVENTIONS  Implementation  Nutrition Education: Importance of adequate nutrition intake discussed with pt. Rec small, frequent meals to help increase oral intake. Gave suggestions of  ideas to order on his current diet order. Gave sodium room service menu and explained room service allocations. Discussed foods which may worsen loose stools and rec he avoid foods that cause sx. Provided handout, Coping with Diarrhea.   Medical food supplement therapy: Offered various oral supplement options. Pt declined all options.   Modify composition of meals/snacks: Agreed to receive a snack. Ordered cottage cheese with grapes and pepper in the am, per pt request. Obtained a snack for pt.   Collaboration with other providers: Paged team regarding rec #1 for MDs/providers to order.      Goals  Patient to consume % of nutritionally adequate meal trays TID, or the equivalent with supplements/snacks.     Monitoring/Evaluation  Progress toward goals will be monitored and evaluated per protocol.       Nutrition will continue to follow.      Bridget Wade, MS, RD, LD, [facility]   6C Pgr: [phone]

## 2022-10-31 NOTE — PROGRESS NOTES
"  [facility]EASES CONSULTATION     Patient:  Dandy Sands   Date of birth 1960, Medical record number [id number]  Date of Visit:  10/31/2022  Date of Admission: 10/27/2022  Consult Requester:Yoli Askew,          Assessment and Recommendations:   ASSESSMENT:  1. 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 Anterior mediastinum fluid collection (decreasing since 8/20/22), possible loculated hematoma  3. Fever with rigors ( Resolved)   4. Diarrhea acute on chronic  5. HFrEF due to ICM, s/p LVAD implantation (7/8/22)  6. BPCUS: Proteus in UC     DISCUSSION:   Dandy Sands is a 62 year old male with history of SLE on hydroxychloroquine, antiphospholipid ab syndrome on warfarin, CAD, HFrEF due to ICM, s/p LVAD implantation (7/8/22), recent COVID-19 infection (10/13/22), C.diff (9/7/22), chronic medication-related diarrhea who presented to ED on 10/27/22 with nausea, vomiting, and purulent drainage from LVAD driveline. Drainge noted ~10/25, culture was collected 10/26 patient was started on doxycycline. Presented to ED 10/27 after developing fever, now with elevated CRP and WBC. CT (10/27) with \"trace encapsulated fluid about the driveline\" and mediastinal fluid collection that is slowly decreasing in size since 8/20/22 and favored to represent loculated hematoma. Evaluated by CVTS with no indication for I&D. Blood cultures with NGTD.     He also noted Cough- for several days, occasional green sputum production. CXR and CT Chest not c/w pneumonia. Recent hospitalization for COVID-19 (10/13/22).    He has hx of chronic diarrhea at baseline, thought to be medication related. Had C.diff in September 2022, treated with PO vancomycin. Currently having 2-3 BM's q day. C-diff PCR +, GDH +, Toxin - Indicating prior infection. Ppx with vancomycin will be low yield for this patient based on current literature.      Over the weekend patient had proteus " growing in UC with no UTI sx. Thus this may not be clinically significant.     RECOMMENDATION:  1. Continue Vancomycin for Corynebacterium striatum. Will likely need 2-4 weeks of treatment pending susceptibilities. Patient will need at least 2 weeks of IV antibiotic therapy followed by reevaluation.  Based on susceptibilities and clinical trajectory patient may need 2 additional weeks of IV versus p.o. antibiotics.  2. Following wound cultures sensitivities ( added on)   3. Stop P/o vanco 125 q day for c-diff ppx       Thank you for this consult. ID will continue to follow.     Patient was discussed with Dr. Baca. Recommendations communicated to primary team via this note and on the phone.     Wade Harding MD  Internal Medicine Resident (PGY3)  [id number]  ___________________________________________________________    Consult Question: LVAD with green purulence from driveline, please evaluate and advise.  Admission Diagnosis: Fever and chills [R50.9]    Interval Hx    Patient continues to endorse 2-3 episodes of diarrhea every day.  Subjectively notes diarrhea is improving and not a major cause of concern for patient at this time.  He does note mild abdominal discomfort.  White count downtrending.  Denies any urinary tract symptoms.    Discussed with ID pharmacy and RN CC.  Since patient would like to move back to [place] he would either need to establish with an ID provider at [place] to order IV antibiotic outpatients due to state license related issues.  An alternative would be for patient to be transferred to a TCU for the duration of IV antibiotic therapy.          Physical Exam:   Vitals were reviewed  Patient Vitals for the past 24 hrs:   BP Temp Temp src Pulse Resp SpO2 Weight   10/31/22 0815 -- 97.5  F (36.4  C) Oral 84 16 98 % --   10/31/22 0421 -- 97.5  F (36.4  C) Oral 94 16 100 % 61.8 kg (136 lb 4.8 oz)   10/31/22 0300 -- -- -- 79 16 -- --   10/31/22 0112 -- -- -- 79 -- -- --   10/31/22 0055 -- --  -- 82 -- -- --   10/31/22 0024 -- 97.7  F (36.5  C) Oral 81 16 97 % --   10/30/22 1957 -- 98.2  F (36.8  C) -- 93 16 96 % --   10/30/22 1720 -- -- -- 92 -- -- --   10/30/22 1552 94/51 98.2  F (36.8  C) Oral 93 17 93 % --     Physical Examination:  GENERAL:  Awake, alert, oriented. Lying supine in bed.  LUNGS:  Clear b/l   CARDIOVASCULAR:  +LVAD hum.  ABDOMEN:  Soft, nondistended, nontender. Drive line dressing in place.   SKIN:  LVAD driveline with green drainage (See photo), NEUROLOGIC:  Grossly nonfocal. Active x4 extremities                 Laboratory Data:     Inflammatory Markers    Recent Labs   Lab Test 10/28/22  0533 10/27/22  2212 10/26/22  1604 10/18/22  0606 10/17/22  0534 10/16/22  0640 10/15/22  0724 10/14/22  1043 08/03/22  0606 06/19/22  1522   SED  --   --   --   --   --   --   --   --   --  39*   .00* 86.70* 83.70* 29.70* 50.30* 70.80* 111.00* 131.00*   < > 28.0*    < > = values in this interval not displayed.       Hematology Studies    Recent Labs   Lab Test 10/31/22  0641 10/30/22  0653 10/29/22  0615 10/28/22  0852 10/27/22  2212 10/27/22  2211 10/26/22  1604 07/20/22  1523 07/20/22  0949 07/20/22  0355 07/19/22  2049 07/19/22  1601 07/19/22  0946 07/19/22  0322 07/18/22 2118   WBC 5.9 7.1 12.6* 15.3* 13.1*  --  8.3   < > 15.9* 13.9* 22.4*   < > 18.5* 15.5* 19.0*   ANEU  --   --   --   --   --   --   --   --  14.6* 11.8* 19.5*  --  16.3* 12.2* 17.5*   AEOS  --   --   --   --   --   --   --   --  0.5 0.1 0.4  --  0.2 0.3 0.4   HGB 8.6* 8.4* 8.7* 9.3* 9.8* 10.2* 9.7*   < > 8.5* 8.1* 8.2*   < > 8.3* 7.0* 7.6*   MCV 92 92 92 94 94  --  93   < > 90 90 90   < > 89 92 91    203 234 264 247  --  239   < > 150 157 200   < > 164 155 179    < > = values in this interval not displayed.       Metabolic Studies     Recent Labs   Lab Test 10/31/22  0641 10/30/22  1438 10/30/22  0653 10/29/22  0615 10/28/22  0852    133* 133* 127* 132*   POTASSIUM 3.7 4.0 4.1 3.9 4.6   CHLORIDE 104 100 102  94* 100   CO2 21* 20* 22 21* 19*   BUN 13.1 13.7 13.2 11.4 11.1   CR 0.71 0.90 0.87 0.86 0.76   GFRESTIMATED >90 >90 >90 >90 >90       Hepatic Studies    Recent Labs   Lab Test 10/31/22  0641 10/30/22  0653 10/29/22  0615 10/28/22  0852 10/27/22  2212 10/26/22  1604   BILITOTAL 0.4 0.4 0.7 0.7 0.4 0.4   ALKPHOS 122 116 106 108 124 109   ALBUMIN 3.2* 3.0* 3.2* 3.2* 3.6 3.6   AST 17 18 18 26 21 20   ALT 7* 7* 6* 10 11 7*       Microbiology:  Culture   Date Value Ref Range Status   10/28/2022 2+ Corynebacterium striatum (A)  Preliminary     Comment:     Identification obtained by MALDI-TOF mass spectrometry research use only database. Test characteristics determined and verified by the Infectious Diseases Diagnostic Laboratory.Susceptibilities not routinely done   10/28/2022 10,000-50,000 CFU/mL Proteus mirabilis (A)  Final   10/27/2022 No growth after 3 days  Preliminary   10/27/2022 No growth after 3 days  Preliminary   10/26/2022 No growth after 4 days  Preliminary   10/26/2022 No growth after 4 days  Preliminary   10/26/2022 No anaerobic organisms isolated after 4 days  Preliminary   10/26/2022 1+ Corynebacterium striatum (A)  Final     Comment:     Identification obtained by MALDI-TOF mass spectrometry research use only database. Test characteristics determined and verified by the Infectious Diseases Diagnostic Laboratory.Susceptibilities not routinely done   08/04/2022 No Growth  Final   08/04/2022 No Growth  Final   08/03/2022 No Growth  Final   08/03/2022 No Growth  Final   08/03/2022 No Growth  Final   08/02/2022 No Growth  Final   08/02/2022 Positive on the 1st day of incubation (A)  Final   08/02/2022 Staphylococcus epidermidis (AA)  Final     Comment:     1 of 2 bottles   08/02/2022 Positive on the 1st day of incubation (A)  Final   08/02/2022 Enterococcus faecalis (AA)  Final     Comment:     1 of 2 bottles   08/01/2022 No Growth  Final   07/31/2022 No Growth  Final   07/30/2022 No Growth  Final    07/29/2022 No Growth  Final   07/28/2022 No Growth  Final   07/28/2022 No anaerobic organisms isolated  Final   07/28/2022 No Growth  Final   07/27/2022 No Growth  Final   07/25/2022 No Growth  Final   07/25/2022 No Growth  Final   07/25/2022 No Growth  Final   07/24/2022 No Growth  Final   07/23/2022 No Growth  Final   07/22/2022 No Growth  Final   07/21/2022 No Growth  Final   07/21/2022 No Growth  Final   07/21/2022 No Growth  Final   07/20/2022 No Growth  Final   07/19/2022 No Growth  Final   07/18/2022 No Growth  Final   07/17/2022 No Growth  Final   07/16/2022 No Growth  Final   07/15/2022 No Growth  Final   07/13/2022 No Growth  Final   07/13/2022 No Growth  Final   07/13/2022 No Growth  Final   07/12/2022 No Growth  Final   07/12/2022 4+ Candida albicans (A)  Final     Comment:     Susceptibilities not routinely done   07/12/2022 1+ Escherichia coli (A)  Final   07/12/2022 3+ Enterococcus faecalis (A)  Final   07/12/2022 No Growth  Final   07/12/2022 No Growth  Final   07/11/2022 No Growth  Final   07/10/2022 No Growth  Final   07/09/2022 No Growth  Final   05/19/2022 No Growth  Final       Urine Studies    Recent Labs   Lab Test 10/28/22  0926 09/19/22  0023 09/10/22  1222 08/31/22  1542 08/03/22  1110 07/21/22  2239   LEUKEST Negative Negative Negative Negative Negative Negative   WBCU 1 <1  --  4 21* 1       Vancomycin Levels    Recent Labs   Lab Test 10/30/22  0653 08/09/22  0341 08/04/22  0744   VANCOMYCIN 23.3 20.1 32.6*       Hepatitis B Testing   Recent Labs   Lab Test 05/20/22  0516   HBCAB Nonreactive   HEPBANG Nonreactive     Hepatitis C Testing     Hepatitis C Antibody   Date Value Ref Range Status   05/20/2022 Nonreactive Nonreactive Final     Respiratory Virus Testing    No results found for: RS, FLUAG          Imaging:   CXR (10/27/22)  IMPRESSION: Lungs are clear. Chronic blunting of the right costophrenic angle. No acute pleural effusion. No pneumothorax.    CT Chest/Abd/Pelvis w/contrast  (10/27/22)  IMPRESSION:   1. Persistent collection in the anterior mediastinum measuring 4.5 x  2.1 cm, slowly decreasing since 8/20/2022. Findings favored to  represent a loculated hematoma. Cannot rule out superimposed infection  such as abscess, though this is not favored given lack of surrounding  inflammatory fat stranding.  2. Nonspecific free fluid in the pelvis. No acute intra-abdominal  findings.  3. Stable prominent axillary, mediastinal, and retroperitoneal lymph  nodes, likely reactive.  4. Thin residual pericardial effusion and trace left pleural effusion.  5. Stable LVAD with patent outflow tract and trace encapsulated fluid  about the drive line in the subcutaneous fat of the epigastric region.

## 2022-10-31 NOTE — PROGRESS NOTES
Care Management Initial Consult    General Information  Assessment completed with: Patient,    Type of CM/SW Visit: Initial Assessment    Primary Care Provider verified and updated as needed: Yes   Readmission within the last 30 days:        Reason for Consult: discharge planning  Advance Care Planning: Advance Care Planning Reviewed: no concerns identified        Communication Assessment  Patient's communication style: spoken language (English or Bilingual)    Hearing Difficulty or Deaf: yes   Wear Glasses or Blind: yes    Cognitive  Cognitive/Neuro/Behavioral: WDL                      Living Environment:   People in home: alone (pta, currently staying local at [facility])  Current living Arrangements: house      Able to return to prior arrangements:       Family/Social Support:  Care provided by: self, child(jesús)  Provides care for:    Marital Status: Single  Children          Description of Support System: Supportive, Involved    Support Assessment: Adequate family and caregiver support    Current Resources:   Patient receiving home care services: Yes  Skilled Home Care Services: Skilled Nursing  Community Resources: None  Equipment currently used at home: walker, standard  Supplies currently used at home: None    Employment/Financial:  Employment Status: other (see comments). Pt has been unable to work since earlier this year and anticipates he may be unable to return.     Financial Concerns: No concerns identified during this visit.    Additional Information:  Pt with LVAD placement in July of 2022, has had multiple admissions and is still staying local the [facility]. Pt currently admitted for nausea, vomiting and driveline site infection. Per pt, he is planning to return to [place] this coming weekend. Pt states his son and daughter are actively looking for an apartment for him in [place] as he is selling his home in [place], [place]. Pt states he could stay with his son or daughter for a while (both  live in [place]), but he is hoping to be able to go directly to a new apartment.     Per MD, pt will need 2-4 weeks of IV antibiotics, final ID recs TBD. This writer sent benefit check to [facility]. PLC had a cancellation this morning at 1130 and pt agreeable to taking this spot.     [facility] (RN)  Phone: [phone]    [facility] (Benefit check pending)  Phone: [phone]    1130: Received update from ID team that pt will need to establish infectious disease follow up in [place] if plan is to return there prior to completing antibiotics course. Requested OP ID referral, will need to fax to:  [facility]  Fax: [phone]    CC will continue to monitor patient's medical condition and progress towards discharge.  Taylor Schmitz RN BSN  6C Unit Care Coordinator  Phone number: [phone]  Pager: [phone]

## 2022-10-31 NOTE — CONSULTS
Dandy was seen bedside for PICC and IV vanco education. He has not done before but has watched many times. Dandy observed saline/heparin flush and HP ball administration of Vanco. He asked good questions and stated understanding of all information. He declined to practice today so will need follow up teaching by home care.    Literature given: Handwashing and Skin Care, Getting Ready for Your PICC, Caring for Your PICC at Home, Changing the End Cap, Flushing the Line with Heparin, Saline or Citrate,    Instructions for IV Medicine Ball,

## 2022-10-31 NOTE — PROGRESS NOTES
[facility]  Patient is currently open to home care services with [facility]. The patient is currently receiving RN    services.  [facility]  and team have been notified of patient admission.  [facility] liaison will continue to follow patient during stay.  If appropriate provide orders to resume home care at time of discharge.

## 2022-10-31 NOTE — PROGRESS NOTES
Pt has coverage for iv abx through their [employer] plan, covered at 100%.       Please contact Intake with any questions, [phone] or In Basket pool, [facility] ([phone]).  
Regular rate and rhythm, Heart sounds S1 S2 present, no murmurs, rubs or gallops

## 2022-10-31 NOTE — PROGRESS NOTES
[facility]   Cardiology Progress Note    Assessment & Plan     Dandy Sands is a 62 year old male PMH of SLE, CAD, HFrEF secondary to ischemic cardiomyopathy s/p LVAD implantation and recent Covid infection admitted on 10/27/2022 who presents with nausea, vomiting and a purulent drainage from his LVAD driveline site.     Patient remains inpatient for ongoing management and work-up of drive line infection.     Changes  - Restart PTA hydralazine  - Per ID will need 2-4 weeks of IV vancomycin for driveline infection  - Discontinue oral vancomycin for C Diff prophylaxis     #Drive line infection  #Stage D HFrEF 2/2 ICM (EF 10-15%) s/p HM3 LVAD (7/28/22) and CRT-D ('06)  #CAD s/p PCI to LAD ('05)  The patient presented with fever, elevated CRP, weakness, loss of appetite and difficulty tolerating oral intake. Family noted purulence while changing the LVAD site dressing two days ago. Doxycycline started with minimal symptom relief. Admission CT CAP reveals persistent mediastinal mass with extensive inflammation around the driveline. Of note, patient with history of ICM with EF of 10-15% and underwent a high risk multivessel PCI in June after which he went into cardiogenic shock requiring LVAD support and temporary RVAD. LVAD alarms on 10/30 night resolved with administration of vasodilator.   - LVAD: LVAD speed 5100 (reduced from 5200 9/19/2022)  - Volume status: Euvolemic  - Dry weight: 134  - Weight on admission: 140  - GDMT              > BB: discontinued during a previous admission, continue to hold              > ACEi/ARB/ARNi: hold pta lisinopril 10 mg daily              > MRA: None              > SGLT2i: None   - Restart PTA hydralazine 25 mg TID  - SCD ppx: s/p CRT-D (2006)  - Continue Warfarin  - Continue pta digoxin 125 mcg daily  - Continue pta atorvastatin 40 mg daily  - Continue daily standing weights  - Continue Vanc, d/c Zosyn (10/28-10/30)  - Patient to discharge on IV vancomycin, treatment duration 2-4  weeks.  - Infectious disease consulted, appreciate recs  - CVTS consulted, appreciate recs     Culture Results:  - 10/28 MRSA Nares Negative  - 10/28 Aerobic Wound culture 2+ Corynebacterium  - 10/28 Urine culture <50k Proteus  - 10/27 Blood culture NGTD  - 10/27 Blood culture: NGTD  - 10/26 Blood culture NGTD  - 10/26 Blood culture: NGTD  - 10/26 Blood culture: NGTD  - 10/26 Anaerobic wound culture: NGTD  - 10/26 Aerobic wound culture 1+ Corynebacterium     #Diarrhea  #Recent C Diff Infection  The patient was recently diagnosed with C Diff and has completed treatment with oral vancomycin. Currently he has watery diarrhea, onset two days ago w/o cramping.   - C.diff and enteric stool panel consistent with colonization  - Per ID ok to discontinue oral vancomycin for prophylaxis     #SLE  #Antiphospholipid syndrome   Follows with rheumatology as outpatient, recent 10/25/22 evaluation notable for no evidence of active autoimmunity or systemic inflammatory disease. Cellcept was discontinued and the patient was to stay only on hydroxychloroquine. However, due to recurrent hospitalizations the patient had minimal exposure to cellcept and this does not represent an abrupt cessation, therefore association between high inflammatory state and autoimmunity is trivial.  - Continue PTA hydroxychloroquine     #GERD  - Continue PTA famotidine     #Hyperlipidemia  - Continue PTA atorvastatin     #Depression  #MIGUEL  - Holding PTA sertraline due to worsening diarrhea     #BPH  - Continue pta tamsulosin    Diet: Fluid restriction 2000 ML FLUID  2 Gram Sodium Diet Cardiac  DVT Prophylaxis: Warfarin  Fitzgerald Catheter: Not present  Fluids: None  Central Lines: None  Cardiac Monitoring: ACTIVE order. Indication: Infective endocarditis (48 hours) - additional guidance recommending until clinically stable  Code Status: Full Code    Dispo: Pending return to [facility] with family, patient expecting to return to [place] by November 7.     Pt was  "seen and examined with Dr. Askew, who agrees with the assessment and plan.    Iesha Harper MD  Internal Medicine, PGY-2  [facility]  [phone]  Securely message with the Vocera Web Console (learn more here)  Text page via [facility] Paging/Directory       Interval History   No acute events overnight, yesterday's notes reviewed AM. Paged from nurse about high LVAD PI and low flow in the setting of high MAPs. PRN hydralazine ordered and effective in resolving alarms. Patient complained of anxiety overnight requiring his PRN hydroxyzine. Still having 1-2 watery bowel movements, complaining of diffuse abdominal pain. Notes that he has been having night sweats. Denies any other concerns. Notes that he was able to walk the hallway without getting SOB.    ROS: A 4-point ROS was otherwise negative except as above.    Data reviewed today: I reviewed all new labs and imaging results over the last 24 hours. I personally reviewed:    Physical Exam   Temp: 97.5  F (36.4  C) Temp src: Oral BP: 94/51 Pulse: 94   Resp: 16 SpO2: 100 % O2 Device: None (Room air)    Vitals:    10/29/22 0400 10/30/22 0656 10/31/22 0421   Weight: 61.8 kg (136 lb 4.8 oz) 62 kg (136 lb 11.2 oz) 61.8 kg (136 lb 4.8 oz)     Vital Signs with Ranges  Temp:  [97.5  F (36.4  C)-98.2  F (36.8  C)] 97.5  F (36.4  C)  Pulse:  [79-94] 94  Resp:  [16-17] 16  BP: ()/(51-83) 94/51  SpO2:  [93 %-100 %] 100 %  I/O last 3 completed shifts:  In: 1310 [P.O.:1080; I.V.:230]  Out: 775 [Urine:775]     , Blood pressure 94/51, pulse 94, temperature 97.5  F (36.4  C), temperature source Oral, resp. rate 16, height 1.702 m (5' 7\"), weight 61.8 kg (136 lb 4.8 oz), SpO2 100 %.  136 lbs 4.8 oz  GEN:  Alert, interactive, appears comfortable, NAD.  CV:  LVAD hum appreciated, no m/r/g, no EDMAR   LUNGS:  CTAB, no wheezes or crackles  ABD:  Active bowel sounds, soft, non-tender/non-distended.  No rebound/guarding/rigidity. LVAD driveline appreciated on RLQ, " dressing applied over.  EXT:  No edema or cyanosis.    SKIN: Warm and dry, no lesions on exposed surfaces    Medications     - MEDICATION INSTRUCTIONS -       - MEDICATION INSTRUCTIONS -         [Held by provider] aspirin  81 mg Oral Daily     atorvastatin  40 mg Oral QPM     digoxin  125 mcg Oral Daily     famotidine  20 mg Oral BID     hydroxychloroquine  200 mg Oral BID     sodium chloride (PF)  3 mL Intracatheter Q8H     tamsulosin  0.4 mg Oral Daily     vancomycin  1,250 mg Intravenous Q24H     vancomycin  125 mg Oral Daily     Warfarin Therapy Reminder  1 each Oral See Admin Instructions       Data   Recent Labs   Lab 10/30/22  1438 10/30/22  0653 10/29/22  0615 10/28/22  0852 10/28/22  0533   WBC  --  7.1 12.6* 15.3*  --    HGB  --  8.4* 8.7* 9.3*  --    MCV  --  92 92 94  --    PLT  --  203 234 264  --    INR  --  3.14* 2.74*  --  2.30*   * 133* 127* 132*  --    POTASSIUM 4.0 4.1 3.9 4.6  --    CHLORIDE 100 102 94* 100  --    CO2 20* 22 21* 19*  --    BUN 13.7 13.2 11.4 11.1  --    CR 0.90 0.87 0.86 0.76  --    ANIONGAP 13 9 12 13  --    ELDA 9.0 8.7* 8.5* 9.2  --    GLC 89 85 95 88  --    ALBUMIN  --  3.0* 3.2* 3.2*  --    PROTTOTAL  --  6.6 6.9 7.1  --    BILITOTAL  --  0.4 0.7 0.7  --    ALKPHOS  --  116 106 108  --    ALT  --  7* 6* 10  --    AST  --  18 18 26  --        No results found for this or any previous visit (from the past 24 hour(s)).

## 2022-10-31 NOTE — DISCHARGE SUMMARY
[facility]  Discharge Summary - Medicine & Pediatrics       Date of Admission:  10/27/2022  Date of Discharge:  11/3/2022  2:21 PM  Discharging Provider: Iesha Harper  Discharge Service: Cards 2 Heart Failure Service  Discharge Diagnoses      #Drive line infection  #Stage D HFrEF 2/2 ICM (EF 10-15%) s/p HM3 LVAD (7/28/22) and CRT-D ('06)  #CAD s/p PCI to LAD ('05)  #Diarrhea  #SLE  #Antiphospholipid syndrome   #GERD  #Hyperlipidemia  #Depression  #MIGUEL  #BPH  #External hemorrhoids    Follow-ups Needed After Discharge       Unresulted Labs Ordered in the Past 30 Days of this Admission     Date and Time Order Name Status Description    10/28/2022  2:26 PM Wound Aerobic Bacterial Culture Routine Preliminary     10/27/2022 10:05 PM Blood Culture Peripheral Blood Preliminary     10/27/2022 10:05 PM Blood Culture Peripheral Blood Preliminary     10/26/2022  4:14 PM BLOOD CULTURE Preliminary     10/26/2022  4:04 PM BLOOD CULTURE Preliminary     10/26/2022  3:11 PM ANAEROBIC BACTERIAL CULTURE ROUTINE Preliminary       These results will be followed up by outpatient ID    Discharge Disposition   Discharged to home  Condition at discharge: Stable    Hospital Course   Dandy Sands is a 62 year old male PMH of SLE, CAD, HFrEF secondary to ischemic cardiomyopathy s/p LVAD implantation and recent Covid infection admitted on 10/27/2022 who presents with nausea, vomiting and a purulent drainage from his LVAD driveline site. Inpatient work-up notable for multiple Corynebacterium positive wound cultures.     #Drive line infection  #Stage D HFrEF 2/2 ICM (EF 10-15%) s/p HM3 LVAD (7/28/22) and CRT-D ('06)  #CAD s/p PCI to LAD ('05)  The patient presents with fever, elevated CRP, weakness, loss of appetite and difficulty tolerating oral intake. Family noted purulence while changing the LVAD site dressing two days ago. Doxycycline started with minimal symptom relief. Admission CT CAP  reveals persistent mediastinal mass with extensive inflammation around the driveline. Of note, patient with history of ICM with EF of 10-15% and underwent a high risk multivessel PCI in June after which he went into cardiogenic shock requiring LVAD support and temporary RVAD. No LVAD alarms. Inpatient work-up notable for multiple Corynebacterium positive wound cultures.   - LVAD: LVAD speed 5100 (reduced from 5200 9/19/2022)  - Volume status: Euvolemic  - Dry weight: 134  - Weight on admission: 140  - GDMT              > BB: discontinued during a previous admission, continue to hold              > ACEi/ARB/ARNi: restarted pta lisinopril 10 mg daily              > MRA: None              > SGLT2i: None   > Continue PTA hydralazine 25mg TID  - SCD ppx: s/p CRT-D (2006)  - Continue Warfarin  - Continue pta digoxin 125 mcg daily  - Continue pta atorvastatin 40 mg daily  - Continue IV vancomycin for drive line infection    #Diarrhea  The patient was recently diagnosed with C Diff and has completed treatment with oral vancomycin. Currently he has watery diarrhea, onset two days ago w/o cramping. Evaluation for C.difficile revealed colonization but no active infection. Patient was continued on prophylactic dosing of oral vancomycin while on broad spectrum antibiotics. Improved prior to discharge. Suspect diarrhea was medication induced from doxycycline. Oral vancomycin was discontinued as diarrhea deemed unlikely to be secondary to C. Diff.  - Follow-up with ID  - Continue PTA loperamide    #External hemorrhoids  #Hematochezia  Patient with known history of hemorrhoids. Had an episode of hematochezia on 11/2/22. DENISSE with no blood in the rectal vault. Hydrocortisone cream applied with good results. Hgb stable. Did not recur while inpatient  - Apply hydrocortisone cream in the anal area     #SLE  #Antiphospholipid syndrome   Follows with rheumatology as outpatient, recent 10/25/22 evaluation notable for no evidence of active  autoimmunity or systemic inflammatory disease. Cellcept was discontinued and the patient was to stay only on hydroxychloroquine. However, due to recurrent hospitalizations the patient had minimal exposure to cellcept and this does not represent an abrupt cessation, therefore association between high inflammatory state and autoimmunity is trivial.  - Continue PTA hydroxychloroquine     #GERD  - Continue PTA famotidine     #Hyperlipidemia  - Continue PTA atorvastatin     #Depression  #MIGUEL  - Holding PTA sertraline due to diarrhea     #BPH  - Continue pta tamsulosin    #Severe malnutrition in the context of chronic illness  -Snacks  -Encourage intake of tolerated foods/beverages which do not exacerbate his loose stools.   - Multivitamin with minerals if inadequate oral intake    Consultations This Hospital Stay   PHARMACY TO DOSE WARFARIN  PHARMACY TO DOSE VANCO  INFECTIOUS DISEASE GENERAL ADULT IP CONSULT  NURSING TO CONSULT FOR VASCULAR ACCESS CARE IP CONSULT  CARDIOTHORACIC SURGERY ADULT IP CONSULT  PHYSICAL THERAPY ADULT IP CONSULT  NURSING TO CONSULT FOR VASCULAR ACCESS CARE IP CONSULT  NURSING TO CONSULT FOR VASCULAR ACCESS CARE IP CONSULT  SMOKING CESSATION PROGRAM IP CONSULT    Code Status   Full Code       The patient was discussed with Dr. Jamilah Harper MD  Internal Medicine, PGY-2  [facility]  [phone]    Cards 2 Heart Failure Service  [facility] [address] [address]  Phone: [phone]  ______________________________________________________________________    Physical Exam   Vital Signs: Temp: 98.2  F (36.8  C) Temp src: Oral BP: 94/51 Pulse: 92   Resp: 17 SpO2: 93 % O2 Device: None (Room air)    Weight: 136 lbs 11.2 oz     General Appearance:  AAOx3, NAD, appears comfortable  Respiratory: CTAB, no crackles, rales or wheezing  Cardiovascular: LVAD hum appreciated, S1, S2, no mrg, no EDMAR, appears euvolemic  GI: Soft, nt, nd, bs  (+), driveline appreciated in the RLQ  Skin: No rash appreciated  Other:  AAOx3, moves freely all extremities, appears comfortable.         Primary Care Physician   Scar Jeffrey    Discharge Orders       Significant Results and Procedures   Results for orders placed or performed during the hospital encounter of 10/27/22   XR Chest 2 Views    Narrative    EXAM: XR CHEST 2 VIEWS  LOCATION: [facility]  DATE/TIME: 10/27/2022 10:50 PM    INDICATION: Fever; LVAD  COMPARISON: 10/13/2022.      Impression    IMPRESSION: Lungs are clear. Chronic blunting of the right costophrenic angle. No acute pleural effusion. No pneumothorax.    Unchanged cardiomegaly. Postsurgical changes of median sternotomy. Left chest wall pacer/defibrillator device is present. A left ventricular device is additionally noted, which appears in unchanged position. Surgical clips overlie the right axilla. An   additional metallic density is partially imaged at the mid abdomen, incompletely evaluated.       Discharge Medications   Current Discharge Medication List      CONTINUE these medications which have NOT CHANGED    Details   acetaminophen (TYLENOL) 325 MG tablet Take 3 tablets (975 mg) by mouth every 8 hours as needed for mild pain  Qty: 270 tablet, Refills: 0    Associated Diagnoses: LVAD (left ventricular assist device) present (H)      aspirin (ASA) 81 MG chewable tablet Take 1 tablet (81 mg) by mouth daily  Qty: 30 tablet, Refills: 0    Associated Diagnoses: Decompensated heart failure (H)      atorvastatin (LIPITOR) 40 MG tablet Take 1 tablet (40 mg) by mouth daily  Qty: 30 tablet, Refills: 0    Associated Diagnoses: Decompensated heart failure (H)      digoxin (LANOXIN) 125 MCG tablet Take 1 tablet (125 mcg) by mouth daily  Qty: 30 tablet, Refills: 0    Associated Diagnoses: Decompensated heart failure (H)      doxycycline hyclate (VIBRAMYCIN) 100 MG capsule Take 1 capsule (100 mg) by mouth 2  times daily  Qty: 14 capsule, Refills: 0    Associated Diagnoses: Chronic systolic congestive heart failure (H); LVAD (left ventricular assist device) present (H)      famotidine (PEPCID) 20 MG tablet Take 1 tablet (20 mg) by mouth 2 times daily  Qty: 60 tablet, Refills: 11    Associated Diagnoses: Chronic systolic congestive heart failure (H); LVAD (left ventricular assist device) present (H); Gastroesophageal reflux disease without esophagitis      hydrALAZINE (APRESOLINE) 25 MG tablet Take 1 tablet (25 mg) by mouth 3 times daily  Qty: 90 tablet, Refills: 0    Associated Diagnoses: Heart failure with reduced ejection fraction, NYHA class III (H)      hydrocortisone 1 % CREA cream Place 1 g rectally daily as needed for itching  Qty: 1 g, Refills: 0    Associated Diagnoses: External hemorrhoids; LVAD (left ventricular assist device) present (H); Heart failure with reduced ejection fraction, NYHA class III (H)      hydroxychloroquine (PLAQUENIL) 200 MG tablet Take 1 tablet (200 mg) by mouth 2 times daily  Qty: 60 tablet, Refills: 4    Associated Diagnoses: Systemic lupus erythematosus, unspecified SLE type, unspecified organ involvement status (H)      lisinopril (ZESTRIL) 10 MG tablet Take 1 tablet (10 mg) by mouth daily  Qty: 30 tablet, Refills: 0    Associated Diagnoses: Decompensated heart failure (H)      loperamide (IMODIUM) 2 MG capsule Take 1 capsule (2 mg) by mouth 2 times daily as needed for diarrhea  Qty: 30 capsule, Refills: 0    Associated Diagnoses: Diarrhea, unspecified type      melatonin 5 MG tablet Take 1 tablet (5 mg) by mouth At Bedtime  Qty: 30 tablet, Refills: 0    Associated Diagnoses: Insomnia, unspecified type      methylcellulose (CITRUCEL) 500 MG TABS tablet Take 2 tablets (1,000 mg) by mouth 2 times daily  Qty: 60 tablet, Refills: 0    Associated Diagnoses: Diarrhea, unspecified type      multivitamin w/minerals (THERA-VIT-M) tablet Take 1 tablet by mouth daily  Refills: 0    Associated  Diagnoses: Heart failure with reduced ejection fraction, NYHA class III (H)      pramox-pe-glycerin-petrolatum (PREPARATION H) 1-0.25-14.4-15 % CREA cream Place rectally 2 times daily as needed for hemorrhoids Apply immediately after a bowel movement.  Qty: 51 g, Refills: 0    Associated Diagnoses: Heart failure with reduced ejection fraction, NYHA class III (H); LVAD (left ventricular assist device) present (H); External hemorrhoids      saline nasal (AYR SALINE) GEL topical gel Apply 1 applicator into each nare nightly as needed for congestion      sodium chloride (OCEAN) 0.65 % nasal spray Spray 2 sprays into both nostrils every 4 hours as needed for congestion      tamsulosin (FLOMAX) 0.4 MG capsule Take 1 capsule (0.4 mg) by mouth daily  Qty: 30 capsule, Refills: 0    Associated Diagnoses: Benign prostatic hyperplasia with lower urinary tract symptoms, symptom details unspecified      !! warfarin ANTICOAGULANT (COUMADIN) 2.5 MG tablet Take 2 tablets (5 mg) to 3 tablets (7.5 mg) by mouth daily or as directed by Anticoagulation Clinic.  Qty: 90 tablet, Refills: 5    Comments: Current dose is 7.5 mg WSu; and 5 mg all other days of the week.  Dose is being adjusted.  Associated Diagnoses: Chronic systolic congestive heart failure (H); LVAD (left ventricular assist device) present (H); Heart failure with reduced ejection fraction, NYHA class III (H); Left ventricular assist device present (H)      !! warfarin ANTICOAGULANT (COUMADIN) 2.5 MG tablet Take 2 tabs (5mg) daily at bedtime. Future dose adjustments pending INR  Qty: 72 tablet, Refills: 0    Associated Diagnoses: LVAD (left ventricular assist device) present (H)       !! - Potential duplicate medications found. Please discuss with provider.        Allergies   No Known Allergies

## 2022-10-31 NOTE — PROGRESS NOTES
Home Infusion  Received referral from ARTURO Vazquez for IV antibiotics.  Benefits verified.  Pt has [facility] plan and is covered at 100%.  Spoke with patient to review home infusion services, review benefits and offer choice of providers.  Patient would like to use [facility] for home infusion.    He is expected to discharge soon and will be going home on IV antibiotics.  He has not done home IV therapy before however he did have PLC earlier today and reports that training went well.  Met with patient at bedside and provided him with information about I services.  Explained about hp ball administration methods and explained that a home nurse can provide additional teaching as needed for self-administration.  Informed him about supplies and delivery of supplies, storage of medication, dosing times, plan for SNV and 24/7 availability of I staff while on IV therapy. Pt states he will be staying at [facility] for approximately 2 days before returning home to [place] on Sunday 11/6. He is currently open to [facility] and is aware that a nursing agency will need to be secured in [place] prior to his discharge.     He verbalized understanding of all information and is willing and able to manage the  home IV therapy once teaching is complete.  Will continue to follow and update pt with more details once discharge is confirmed.    Nica David RN / Nurse Liaison  [facility]  [email or web address]  Cell [phone] M-F/ [facility] office [phone] *24/7

## 2022-11-01 ENCOUNTER — TELEPHONE (OUTPATIENT)
Dept: INFECTIOUS DISEASES | Facility: CLINIC | Age: 62
End: 2022-11-01
Payer: COMMERCIAL

## 2022-11-01 ENCOUNTER — APPOINTMENT (OUTPATIENT)
Dept: GENERAL RADIOLOGY | Facility: CLINIC | Age: 62
DRG: 314 | End: 2022-11-01
Payer: COMMERCIAL

## 2022-11-01 DIAGNOSIS — Z53.9 ERRONEOUS ENCOUNTER--DISREGARD: Primary | ICD-10-CM

## 2022-11-01 LAB
ALBUMIN SERPL BCG-MCNC: 3.1 G/DL (ref 3.5–5.2)
ALP SERPL-CCNC: 118 U/L (ref 40–129)
ALT SERPL W P-5'-P-CCNC: 8 U/L (ref 10–50)
ANION GAP SERPL CALCULATED.3IONS-SCNC: 13 MMOL/L (ref 7–15)
AST SERPL W P-5'-P-CCNC: 18 U/L (ref 10–50)
BACTERIA BLD CULT: NO GROWTH
BACTERIA BLD CULT: NO GROWTH
BASOPHILS # BLD AUTO: 0 10E3/UL (ref 0–0.2)
BASOPHILS NFR BLD AUTO: 1 %
BILIRUB SERPL-MCNC: 0.3 MG/DL
BUN SERPL-MCNC: 12.6 MG/DL (ref 8–23)
CALCIUM SERPL-MCNC: 8.8 MG/DL (ref 8.8–10.2)
CHLORIDE SERPL-SCNC: 106 MMOL/L (ref 98–107)
CREAT SERPL-MCNC: 0.68 MG/DL (ref 0.67–1.17)
DEPRECATED HCO3 PLAS-SCNC: 20 MMOL/L (ref 22–29)
EOSINOPHIL # BLD AUTO: 0.2 10E3/UL (ref 0–0.7)
EOSINOPHIL NFR BLD AUTO: 3 %
ERYTHROCYTE [DISTWIDTH] IN BLOOD BY AUTOMATED COUNT: 15.3 % (ref 10–15)
GFR SERPL CREATININE-BSD FRML MDRD: >90 ML/MIN/1.73M2
GLUCOSE SERPL-MCNC: 96 MG/DL (ref 70–99)
HCT VFR BLD AUTO: 28.2 % (ref 40–53)
HGB BLD-MCNC: 8.8 G/DL (ref 13.3–17.7)
IMM GRANULOCYTES # BLD: 0.1 10E3/UL
IMM GRANULOCYTES NFR BLD: 1 %
INR PPP: 2.5 (ref 0.85–1.15)
LYMPHOCYTES # BLD AUTO: 1 10E3/UL (ref 0.8–5.3)
LYMPHOCYTES NFR BLD AUTO: 16 %
MAGNESIUM SERPL-MCNC: 1.7 MG/DL (ref 1.7–2.3)
MCH RBC QN AUTO: 28.7 PG (ref 26.5–33)
MCHC RBC AUTO-ENTMCNC: 31.2 G/DL (ref 31.5–36.5)
MCV RBC AUTO: 92 FL (ref 78–100)
MONOCYTES # BLD AUTO: 0.5 10E3/UL (ref 0–1.3)
MONOCYTES NFR BLD AUTO: 8 %
NEUTROPHILS # BLD AUTO: 4.5 10E3/UL (ref 1.6–8.3)
NEUTROPHILS NFR BLD AUTO: 71 %
NRBC # BLD AUTO: 0 10E3/UL
NRBC BLD AUTO-RTO: 0 /100
PLATELET # BLD AUTO: 251 10E3/UL (ref 150–450)
POTASSIUM SERPL-SCNC: 4 MMOL/L (ref 3.4–5.3)
PROT SERPL-MCNC: 6.8 G/DL (ref 6.4–8.3)
RBC # BLD AUTO: 3.07 10E6/UL (ref 4.4–5.9)
SODIUM SERPL-SCNC: 139 MMOL/L (ref 136–145)
VANCOMYCIN SERPL-MCNC: 15.1 UG/ML
WBC # BLD AUTO: 6.3 10E3/UL (ref 4–11)

## 2022-11-01 PROCEDURE — 250N000013 HC RX MED GY IP 250 OP 250 PS 637: Performed by: STUDENT IN AN ORGANIZED HEALTH CARE EDUCATION/TRAINING PROGRAM

## 2022-11-01 PROCEDURE — 83735 ASSAY OF MAGNESIUM: CPT | Performed by: INTERNAL MEDICINE

## 2022-11-01 PROCEDURE — 999N000065 XR CHEST PORT 1 VIEW

## 2022-11-01 PROCEDURE — 36569 INSJ PICC 5 YR+ W/O IMAGING: CPT

## 2022-11-01 PROCEDURE — 250N000009 HC RX 250

## 2022-11-01 PROCEDURE — 85025 COMPLETE CBC W/AUTO DIFF WBC: CPT | Performed by: STUDENT IN AN ORGANIZED HEALTH CARE EDUCATION/TRAINING PROGRAM

## 2022-11-01 PROCEDURE — 272N000450 HC KIT 4FR POWER PICC SINGLE LUMEN

## 2022-11-01 PROCEDURE — 80202 ASSAY OF VANCOMYCIN: CPT | Performed by: INTERNAL MEDICINE

## 2022-11-01 PROCEDURE — 250N000011 HC RX IP 250 OP 636: Performed by: INTERNAL MEDICINE

## 2022-11-01 PROCEDURE — 71045 X-RAY EXAM CHEST 1 VIEW: CPT | Mod: 26 | Performed by: RADIOLOGY

## 2022-11-01 PROCEDURE — 80053 COMPREHEN METABOLIC PANEL: CPT | Performed by: STUDENT IN AN ORGANIZED HEALTH CARE EDUCATION/TRAINING PROGRAM

## 2022-11-01 PROCEDURE — 250N000013 HC RX MED GY IP 250 OP 250 PS 637

## 2022-11-01 PROCEDURE — 250N000011 HC RX IP 250 OP 636

## 2022-11-01 PROCEDURE — 258N000003 HC RX IP 258 OP 636: Performed by: INTERNAL MEDICINE

## 2022-11-01 PROCEDURE — 85610 PROTHROMBIN TIME: CPT

## 2022-11-01 PROCEDURE — 214N000001 HC R&B CCU UMMC

## 2022-11-01 PROCEDURE — 99232 SBSQ HOSP IP/OBS MODERATE 35: CPT | Mod: GC | Performed by: INTERNAL MEDICINE

## 2022-11-01 PROCEDURE — 250N000013 HC RX MED GY IP 250 OP 250 PS 637: Performed by: INTERNAL MEDICINE

## 2022-11-01 PROCEDURE — 36415 COLL VENOUS BLD VENIPUNCTURE: CPT | Performed by: INTERNAL MEDICINE

## 2022-11-01 RX ORDER — HEPARIN SODIUM,PORCINE 10 UNIT/ML
5-20 VIAL (ML) INTRAVENOUS
Status: DISCONTINUED | OUTPATIENT
Start: 2022-11-01 | End: 2022-11-03 | Stop reason: HOSPADM

## 2022-11-01 RX ORDER — LISINOPRIL 5 MG/1
5 TABLET ORAL DAILY
Status: DISCONTINUED | OUTPATIENT
Start: 2022-11-01 | End: 2022-11-02

## 2022-11-01 RX ORDER — WARFARIN SODIUM 4 MG/1
4 TABLET ORAL
Status: COMPLETED | OUTPATIENT
Start: 2022-11-01 | End: 2022-11-01

## 2022-11-01 RX ORDER — HEPARIN SODIUM,PORCINE 10 UNIT/ML
5-20 VIAL (ML) INTRAVENOUS EVERY 24 HOURS
Status: DISCONTINUED | OUTPATIENT
Start: 2022-11-01 | End: 2022-11-03 | Stop reason: HOSPADM

## 2022-11-01 RX ADMIN — LISINOPRIL 5 MG: 5 TABLET ORAL at 13:10

## 2022-11-01 RX ADMIN — HYDROXYZINE HYDROCHLORIDE 50 MG: 50 TABLET ORAL at 20:33

## 2022-11-01 RX ADMIN — Medication 5 MG: at 20:33

## 2022-11-01 RX ADMIN — ATORVASTATIN CALCIUM 40 MG: 40 TABLET, FILM COATED ORAL at 20:33

## 2022-11-01 RX ADMIN — HYDROXYCHLOROQUINE SULFATE 200 MG: 200 TABLET, FILM COATED ORAL at 07:59

## 2022-11-01 RX ADMIN — WARFARIN SODIUM 4 MG: 4 TABLET ORAL at 20:33

## 2022-11-01 RX ADMIN — HYDRALAZINE HYDROCHLORIDE 25 MG: 25 TABLET, FILM COATED ORAL at 07:59

## 2022-11-01 RX ADMIN — LIDOCAINE HYDROCHLORIDE ANHYDROUS 1 ML: 10 INJECTION, SOLUTION INFILTRATION at 08:54

## 2022-11-01 RX ADMIN — HYDRALAZINE HYDROCHLORIDE 25 MG: 25 TABLET, FILM COATED ORAL at 20:33

## 2022-11-01 RX ADMIN — FAMOTIDINE 20 MG: 20 TABLET ORAL at 07:59

## 2022-11-01 RX ADMIN — ACETAMINOPHEN 650 MG: 325 TABLET, FILM COATED ORAL at 16:30

## 2022-11-01 RX ADMIN — FAMOTIDINE 20 MG: 20 TABLET ORAL at 20:33

## 2022-11-01 RX ADMIN — HYDRALAZINE HYDROCHLORIDE 25 MG: 25 TABLET, FILM COATED ORAL at 13:10

## 2022-11-01 RX ADMIN — HYDROXYCHLOROQUINE SULFATE 200 MG: 200 TABLET, FILM COATED ORAL at 20:33

## 2022-11-01 RX ADMIN — Medication 5 ML: at 16:30

## 2022-11-01 RX ADMIN — DIGOXIN 125 MCG: 125 TABLET ORAL at 07:59

## 2022-11-01 RX ADMIN — Medication 1500 MG: at 10:46

## 2022-11-01 RX ADMIN — TAMSULOSIN HYDROCHLORIDE 0.4 MG: 0.4 CAPSULE ORAL at 07:59

## 2022-11-01 ASSESSMENT — ACTIVITIES OF DAILY LIVING (ADL)
ADLS_ACUITY_SCORE: 39
ADLS_ACUITY_SCORE: 40
ADLS_ACUITY_SCORE: 39
ADLS_ACUITY_SCORE: 33
ADLS_ACUITY_SCORE: 39

## 2022-11-01 NOTE — PROGRESS NOTES
Added on minocycline susceptibilities.   Called Dr. Goode's office ([phone]) and requested a call back to discuss case before discharge.     Shania Baca  Infectious Diseases

## 2022-11-01 NOTE — TELEPHONE ENCOUNTER
Shania Baca DO P [facility]; P [facility]; P [facility] Infectious Disease Adult [facility]  Patient to discharge to [place]. Please ensure transitions of care are okay.

## 2022-11-01 NOTE — PLAN OF CARE
Admitted on (10/27) with nausea, vomiting and a purulent drainage from his LVAD driveline site. Pt is now ongoing management and work-up of drive line infection. PMHx of SLE, CAD, HFrEF secondary to ischemic cardiomyopathy s/p LVAD implantation and recent Covid infection.     Code status: Full code   Team: En[place] 2     Neuro: AOx4, Calm and cooperative, no deficits  Cardiac/Tele/VS: SR with BBB, HR 80's-90's, BP normotensive, MAPs WNL. LVAD PI's high (fluctuating from 7.5-9.0).Pt asymptomatic and sleeping. Cards resident notified. No interventions at this time.  Respiratory: Room air, tolerates well. Slight CASEY noted. Sats > 92%. LS clear diminished on the bases.  GI/: Voids independently without difficulty. Bowel movements regular. Bowel sounds audible and normoactive. LBM 10/31  Diet/Appetite: 2g Na diet with 2L FR. Good appetite  Skin: Generalized, abdominal bruising. LVAD driveline site dressing CDI. Dressing changes   Endocrine/Electrolytes: No BG checks. No electrolytes replaced this shft  LDAs: PIV saline locked  Activity: Up SBA  Pain: Endorses some shoulder pain. Tylenol x1 given.     Plan: PICC placement for home ABX infusion. Son wanted more teaching on PICC. Continue to monitor notify team of concerns

## 2022-11-01 NOTE — PROVIDER NOTIFICATION
11/01/22 0902   PICC 11/01/22 Single Lumen Right Brachial vein lateral Antibiotic   Placement Date/Time: 11/01/22 (c) 0902   Lumens: Single Lumen  Lumen Identification: Purple  Catheter Brand: [employer]  Catheter Size: 4 fr  Lot #: [id number]  Full barrier precautions done: Yes, hand hygiene, sterile gown, sterile gloves, mask, cap, full misty...   Site Assessment WDL   External Cath Length (cm) (S)  2 cm   Extremity Circumference (cm) 24 cm   Dressing Chlorhexidine disk;Transparent;Securement device   Dressing Status clean;dry;intact   Dressing Change Due (S)  11/08/22   Line Necessity Yes, meets criteria   Phlebitis Scale 0-->no symptoms   Infiltration? no   Purple - Status blood return noted;heparin locked   Purple - Cap Change Due 11/05/22   Purple - Intervention Flushed

## 2022-11-01 NOTE — PROGRESS NOTES
[facility]   Cardiology Progress Note    Assessment & Plan     Dandy Sands is a 62 year old male PMH of SLE, CAD, HFrEF secondary to ischemic cardiomyopathy s/p LVAD implantation and recent Covid infection admitted on 10/27/2022 who presents with nausea, vomiting and a purulent drainage from his LVAD driveline site.     Patient remains inpatient for ongoing management and work-up of drive line infection. Outpatient ID follow up has been arranged in [place] as of 11/1. Plan for patient to discharge on 11/2 back to [facility] with PICC line for IV vancomycin, then return to [place] to stay with his son over the coming weekend.      Changes  - Restart PTA lisinopril a thalf of home dose  - Plan to discharge back to [place] with son for 2-4 weeks of IV vancomycin for driveline infection  - Discontinue oral vancomycin for C Diff prophylaxis     #Drive line infection  #Stage D HFrEF 2/2 ICM (EF 10-15%) s/p HM3 LVAD (7/28/22) and CRT-D ('06)  #CAD s/p PCI to LAD ('05)  The patient presented with fever, elevated CRP, weakness, loss of appetite and difficulty tolerating oral intake. Family noted purulence while changing the LVAD site dressing two days ago. Doxycycline started with minimal symptom relief. Admission CT CAP reveals persistent mediastinal mass with extensive inflammation around the driveline. Of note, patient with history of ICM with EF of 10-15% and underwent a high risk multivessel PCI in June after which he went into cardiogenic shock requiring LVAD support and temporary RVAD. LVAD alarms on 10/30 night resolved with administration of vasodilator.   - LVAD: LVAD speed 5100 (reduced from 5200 9/19/2022)  - Volume status: Euvolemic  - Dry weight: 134  - Weight on admission: 140  - GDMT              > BB: discontinued during a previous admission, continue to hold              > ACEi/ARB/ARNi: restarted PTA lisinopril on 11/1 at half of home dose (5 mg daily)              > MRA: None              > SGLT2i:  None   - Continue PTA hydralazine 25 mg TID  - SCD ppx: s/p CRT-D (2006)  - Continue Warfarin  - Continue pta digoxin 125 mcg daily  - Continue pta atorvastatin 40 mg daily  - Continue daily standing weights  - Continue IV Vanc, d/c Zosyn (10/28-10/30)  - Patient to discharge on IV vancomycin, treatment duration 2-4 weeks.  - Infectious disease consulted, appreciate recs  - CVTS consulted, appreciate recs     Culture Results:  - 10/28 MRSA Nares Negative  - 10/28 Aerobic Wound culture 2+ Corynebacterium  - 10/28 Urine culture <50k Proteus  - 10/27 Blood culture NGTD  - 10/27 Blood culture: NGTD  - 10/26 Blood culture NGTD  - 10/26 Blood culture: NGTD  - 10/26 Blood culture: NGTD  - 10/26 Anaerobic wound culture: NGTD  - 10/26 Aerobic wound culture 1+ Corynebacterium     #Diarrhea  #Recent C Diff Infection  The patient was recently diagnosed with C Diff and has completed treatment with oral vancomycin. Currently he has watery diarrhea, onset two days ago w/o cramping.   - C.diff and enteric stool panel consistent with colonization  - Per ID ok to discontinue oral vancomycin for prophylaxis     #SLE  #Antiphospholipid syndrome   Follows with rheumatology as outpatient, recent 10/25/22 evaluation notable for no evidence of active autoimmunity or systemic inflammatory disease. Cellcept was discontinued and the patient was to stay only on hydroxychloroquine. However, due to recurrent hospitalizations the patient had minimal exposure to cellcept and this does not represent an abrupt cessation, therefore association between high inflammatory state and autoimmunity is trivial.  - Continue PTA hydroxychloroquine     #GERD  - Continue PTA famotidine     #Hyperlipidemia  - Continue PTA atorvastatin     #Depression  #MIGUEL  - Holding PTA sertraline due to worsening diarrhea     #BPH  - Continue pta tamsulosin    Diet: Fluid restriction 2000 ML FLUID  2 Gram Sodium Diet Cardiac  DVT Prophylaxis: Warfarin  Fitzgerald Catheter: Not  "present  Fluids: None  Central Lines: None  Cardiac Monitoring: ACTIVE order. Indication: Infective endocarditis (48 hours) - additional guidance recommending until clinically stable  Code Status: Full Code    Dispo: Pending return to [facility] with family, patient expecting to return to [place] by November 7.     Pt was seen and examined with Dr. Askew, who agrees with the assessment and plan.    Iesha Harper MD  Internal Medicine, PGY-2  [facility]  [phone]  Securely message with the Vocera Web Console (learn more here)  Text page via [facility] Paging/Directory       Interval History   No acute events overnight. Once again, patient had PIs in the 9s overnight with otherwise stable vitals and no symptoms. No LVAD alarms. Denies any other concerns. Denies chest pain, shortness of breath, palpitations, abdominal pain, lightheadedness, nausea, vomiting. Still has some ongoing watery diarrhea.     ROS: A 4-point ROS was otherwise negative except as above.    Data reviewed today: I reviewed all new labs and imaging results over the last 24 hours. I personally reviewed:    Physical Exam   Temp: 97.6  F (36.4  C) Temp src: Oral BP: 103/80 Pulse: 89   Resp: 16 SpO2: 98 % O2 Device: None (Room air)    Vitals:    10/30/22 0656 10/31/22 0421 11/01/22 0555   Weight: 62 kg (136 lb 11.2 oz) 61.8 kg (136 lb 4.8 oz) 62 kg (136 lb 11.2 oz)     Vital Signs with Ranges  Temp:  [97.5  F (36.4  C)-97.9  F (36.6  C)] 97.6  F (36.4  C)  Pulse:  [83-93] 89  Resp:  [16-18] 16  BP: ()/(74-86) 103/80  SpO2:  [97 %-98 %] 98 %  I/O last 3 completed shifts:  In: 1120 [P.O.:1120]  Out: 550 [Urine:550]     , Blood pressure 103/80, pulse 89, temperature 97.6  F (36.4  C), temperature source Oral, resp. rate 16, height 1.702 m (5' 7\"), weight 62 kg (136 lb 11.2 oz), SpO2 98 %.  136 lbs 11.2 oz  GEN:  Alert, interactive, appears comfortable, NAD.  CV:  LVAD hum appreciated, no m/r/g, no EDMAR   LUNGS:  CTAB, no wheezes or " crackles  ABD:  Active bowel sounds, soft, non-tender/non-distended.  No rebound/guarding/rigidity. LVAD driveline appreciated on RLQ, dressing applied over.  EXT:  No edema or cyanosis.    SKIN: Warm and dry, no lesions on exposed surfaces    Medications     - MEDICATION INSTRUCTIONS -       - MEDICATION INSTRUCTIONS -         atorvastatin  40 mg Oral QPM     digoxin  125 mcg Oral Daily     famotidine  20 mg Oral BID     heparin lock flush  5-20 mL Intracatheter Q24H     hydrALAZINE  25 mg Oral TID     hydroxychloroquine  200 mg Oral BID     lisinopril  5 mg Oral Daily     sodium chloride (PF)  3 mL Intracatheter Q8H     tamsulosin  0.4 mg Oral Daily     vancomycin  1,500 mg Intravenous Q24H     warfarin ANTICOAGULANT  4 mg Oral ONCE at 18:00     Warfarin Therapy Reminder  1 each Oral See Admin Instructions       Data   Recent Labs   Lab 11/01/22  0755 11/01/22  0754 10/31/22  0641 10/30/22  1438 10/30/22  0653   WBC 6.3  --  5.9  --  7.1   HGB 8.8*  --  8.6*  --  8.4*   MCV 92  --  92  --  92     --  223  --  203   INR  --  2.50* 2.66*  --  3.14*   NA  --  139 136 133* 133*   POTASSIUM  --  4.0 3.7 4.0 4.1   CHLORIDE  --  106 104 100 102   CO2  --  20* 21* 20* 22   BUN  --  12.6 13.1 13.7 13.2   CR  --  0.68 0.71 0.90 0.87   ANIONGAP  --  13 11 13 9   ELDA  --  8.8 8.8 9.0 8.7*   GLC  --  96 82 89 85   ALBUMIN  --  3.1* 3.2*  --  3.0*   PROTTOTAL  --  6.8 6.9  --  6.6   BILITOTAL  --  0.3 0.4  --  0.4   ALKPHOS  --  118 122  --  116   ALT  --  8* 7*  --  7*   AST  --  18 17  --  18       Recent Results (from the past 24 hour(s))   XR Chest Port 1 View    Narrative    XR CHEST PORT 1 VIEW  11/1/2022 10:06 AM      HISTORY: 62-year-old male status post PICC placement.     COMPARISON: 10/27/2022    FINDINGS:   Portable 45 degree AP projection radiograph of the chest. Coronary  stent. Right-sided PICC tip projects over the superior cavoatrial  junction. Left chest wall pacer/defibrillator leads in  stable  position. LVAD unchanged position. Sternotomy wires intact. Trachea is  midline. Cardiac silhouette is stable. Unchanged blunting of the right  costophrenic angle. No appreciable pneumothorax. No focal airspace  opacity.        Impression    IMPRESSION:   1.  PICC tip projects over the superior cavoatrial junction.  2.  No pneumothorax or focal air space opacities.    I have personally reviewed the examination and initial interpretation  and I agree with the findings.    GABRIEL COURTNEY MD         SYSTEM ID:  [id number]

## 2022-11-01 NOTE — PROCEDURES
[facility]    Single Lumen PICC Placement    Date/Time: 11/1/2022 9:02 AM  Performed by: Yusuf Love RN  Authorized by: Remedios Ansari MD   Indications: Antibiotic.      UNIVERSAL PROTOCOL   Site Marked: Yes  Prior Images Obtained and Reviewed:  Yes  Required items: Required blood products, implants, devices and special equipment available    Patient identity confirmed:  Verbally with patient, arm band, provided demographic data and hospital-assigned identification number  Patient was reevaluated immediately before administering moderate or deep sedation or anesthesia  Confirmation Checklist:  Patient's identity using two indicators, relevant allergies, procedure was appropriate and matched the consent or emergent situation and correct equipment/implants were available  Time out: Immediately prior to the procedure a time out was called    Universal Protocol: the [facility] Universal Protocol was followed    Preparation: Patient was prepped and draped in usual sterile fashion       ANESTHESIA    Anesthesia: See MAR for details  Local Anesthetic:  Lidocaine 1% without epinephrine  Anesthetic Total (mL):  1      SEDATION    Patient Sedated: No        Preparation: skin prepped with ChloraPrep  Skin prep agent: skin prep agent completely dried prior to procedure  Sterile barriers: maximum sterile barriers were used: cap, mask, sterile gown, sterile gloves, and large sterile sheet  Hand hygiene: hand hygiene performed prior to central venous catheter insertion  Type of line used: PICC  Catheter type: single lumen  Lumen type: non-valved and power PICC  Catheter size: 4 Fr  Brand: Bard  Lot number: [id number]  Placement method: venipuncture, MST, ultrasound and tip navigation system  Number of attempts: 1  Difficulty threading catheter: no  Successful placement: yes  Orientation: right    Location: brachial vein (lateral) (vein diameter - 0.43 cm)  Tip  Location: SVC (Low)  Site rationale: Left chest pacemaker  Arm circumference: adults 10 cm  Extremity circumference: 24  Visible catheter length: 2  Total catheter length: 41  Dressing and securement: alcohol impregnated caps, chlorhexidine disc applied, transparent dressing, tissue adhesive, sterile dressing applied, statlock and site cleansed  Post procedure assessment: blood return through all ports, free fluid flow and placement verified by x-ray  PROCEDURE   Patient Tolerance:  Patient tolerated the procedure well with no immediate complicationsDescribe Procedure: PICC is OK to use.

## 2022-11-01 NOTE — PROGRESS NOTES
"Care Management Follow Up    Length of Stay (days): 5    Expected Discharge Date: 11/02/2022     Concerns to be Addressed: Discharge planning    Patient plan of care discussed at interdisciplinary rounds: Yes    Anticipated Discharge Disposition: [facility] until 11/5 and then to son's home in [place], [place].    Amos Villasenor's home address:  [address] [place] [address]     Anticipated Discharge Services: Home Infusion,Home Care  Anticipated Discharge DME: None    Education Provided on the Discharge Plan: Yes  Patient/Family in Agreement with the Plan: Yes    Referrals Placed by CM/SW: Home Infusion   Private pay costs discussed: Not applicable    Additional Information:  This writer had lengthy discussion with pt, son Amos Trinidad's robe and Dr. Askew regarding discharge plan. Pt now agreeable to discharging to his sonAmos's home until he completes course of IV antibiotics.   Pt will likely discharge tomorrow back to [facility] and then daughterAsha will come on Saturday to drive pt back to [place] as Amos doesn't have room in his car.   Pt had PLC yesterday, will plan for home nursing to teach Amos and robe at [facility] on Thursday.  Updated [facility] liason of plan, new address for [place] and new PCP appointment. She will confirm that [facility] can see pt on Thursday and the office will work on finding a home care agency for RN/PT/OT when pt returns to son's home this weekend.     Per [facility]: \"Pt has coverage for iv abx through their [facility] plan, covered at 100%.\"     Scheduled the following follow ups:  [facility]  [address]   [place] [address]  Pt scheduled on Weds, 11/9 at 9:45am with Dr. Goode.   Dr. Baca updated and will call to do sign out.     [facility]  [address]  [place], [place]  Phone: [phone]  Fax: [phone]  Pt scheduled on Monday, 11/7 at 1pm with Dr. Austin Sierra for " establishment of care and post hospitalization follow up.       [facility] (RN)  Phone: [phone]     [facility] (Home IV abx)  Phone: [phone]     CC will continue to monitor patient's medical condition and progress towards discharge.  Taylor Schmitz RN BSN  6C Unit Care Coordinator  Phone number: [phone]  Pager: [phone]

## 2022-11-01 NOTE — PHARMACY-VANCOMYCIN DOSING SERVICE
Pharmacy Vancomycin Note  Date of Service 2022  Patient's  1960   62 year old, male    Indication: LVAD driveline infection   Day of Therapy: 5  Current vancomycin regimen:  1250 mg IV q24h  Current vancomycin monitoring method: AUC  Current vancomycin therapeutic monitoring goal: 400-600 mg*h/L    [employer]ight[employer] Prediction of Current Vancomycin Regimen  Dosing regimen: 1250 mg (20.2 mg/kg) IV Q24 hr  AUC24, ss: 408 mg/L.hr  Ctrough, ss: 10.2 mg/L  Pauc: 56 % (probability that AUC is >400 - efficacy)  Pconc: 0 % (probability that Ctrough is above 20 mcg/mL - toxicity)  Toxicity: 6 % (probability of nephrotoxicity)    Current estimated CrCl = Estimated Creatinine Clearance: 100 mL/min (based on SCr of 0.68 mg/dL).    Creatinine for last 3 days  10/30/2022:  6:53 AM Creatinine 0.87 mg/dL;  2:38 PM Creatinine 0.90 mg/dL  10/31/2022:  6:41 AM Creatinine 0.71 mg/dL  2022:  7:54 AM Creatinine 0.68 mg/dL    Recent Vancomycin Levels (past 3 days)  10/30/2022:  6:53 AM Vancomycin 23.3 ug/mL  2022:  7:54 AM Vancomycin 15.1 ug/mL    Vancomycin IV Administrations (past 72 hours)                   vancomycin (VANCOCIN) 1,250 mg in 0.9% NaCl 250 mL intermittent infusion (mg) 1,250 mg New Bag 10/31/22 1136     1,250 mg New Bag 10/30/22 1051    vancomycin (VANCOCIN) 750 mg in sodium chloride 0.9 % 250 mL intermittent infusion (mg) 750 mg New Bag 10/29/22 2231    vancomycin (VANCOCIN) 1000 mg in dextrose 5% 200 mL PREMIX (mg) 1,000 mg New Bag 10/29/22 1120                Nephrotoxins and other renal medications (From now, onward)    Start     Dose/Rate Route Frequency Ordered Stop    10/30/22 1030  vancomycin (VANCOCIN) 1,250 mg in 0.9% NaCl 250 mL intermittent infusion         1,250 mg  over 90 Minutes Intravenous EVERY 24 HOURS 10/30/22 0937               Contrast Orders - past 72 hours (72h ago, onward)    None          Interpretation of levels and current regimen:  Vancomycin level is reflective of AUC  400-600    Has serum creatinine changed greater than 50% in last 72 hours: No    Urine output:  good urine output    Renal Function: Stable    Insight[employer] Prediction of Planned New Vancomycin Regimen  Dosing regimen: 1500 mg (24.2 mg/kg) IV Q24 hr  AUC24, ss: 486 mg/L.hr  Ctrough, ss: 12.2 mg/L  Pauc: 94 % (probability that AUC is >400 - efficacy)  Pconc: 1 % (probability that Ctrough is above 20 mcg/mL - toxicity)  Toxicity: 7 % (probability of nephrotoxicity)    Plan:  1. Increase Dose to 1500 mg IV Q24 hr  2. Vancomycin monitoring method: AUC  3. Vancomycin therapeutic monitoring goal: 400-600 mg*h/L  4. Pharmacy will check vancomycin levels as appropriate in 1-3 Days.  5. Serum creatinine levels will be ordered daily for the first week of therapy and at least twice weekly for subsequent weeks.    Susan Esteban, PharmD, BCPS

## 2022-11-02 LAB
ALBUMIN SERPL BCG-MCNC: 2.9 G/DL (ref 3.5–5.2)
ALP SERPL-CCNC: 117 U/L (ref 40–129)
ALT SERPL W P-5'-P-CCNC: 9 U/L (ref 10–50)
ANION GAP SERPL CALCULATED.3IONS-SCNC: 11 MMOL/L (ref 7–15)
AST SERPL W P-5'-P-CCNC: 20 U/L (ref 10–50)
BACTERIA WND CULT: NORMAL
BASOPHILS # BLD AUTO: 0.1 10E3/UL (ref 0–0.2)
BASOPHILS NFR BLD AUTO: 1 %
BILIRUB SERPL-MCNC: 0.3 MG/DL
BUN SERPL-MCNC: 11 MG/DL (ref 8–23)
CALCIUM SERPL-MCNC: 8.8 MG/DL (ref 8.8–10.2)
CHLORIDE SERPL-SCNC: 109 MMOL/L (ref 98–107)
CREAT SERPL-MCNC: 0.57 MG/DL (ref 0.67–1.17)
DEPRECATED HCO3 PLAS-SCNC: 20 MMOL/L (ref 22–29)
EOSINOPHIL # BLD AUTO: 0.2 10E3/UL (ref 0–0.7)
EOSINOPHIL NFR BLD AUTO: 3 %
ERYTHROCYTE [DISTWIDTH] IN BLOOD BY AUTOMATED COUNT: 15.5 % (ref 10–15)
ERYTHROCYTE [DISTWIDTH] IN BLOOD BY AUTOMATED COUNT: 15.5 % (ref 10–15)
GFR SERPL CREATININE-BSD FRML MDRD: >90 ML/MIN/1.73M2
GLUCOSE SERPL-MCNC: 98 MG/DL (ref 70–99)
HCT VFR BLD AUTO: 28.3 % (ref 40–53)
HCT VFR BLD AUTO: 29.2 % (ref 40–53)
HGB BLD-MCNC: 8.6 G/DL (ref 13.3–17.7)
HGB BLD-MCNC: 9.4 G/DL (ref 13.3–17.7)
IMM GRANULOCYTES # BLD: 0.1 10E3/UL
IMM GRANULOCYTES NFR BLD: 1 %
INR PPP: 2.38 (ref 0.85–1.15)
LYMPHOCYTES # BLD AUTO: 1 10E3/UL (ref 0.8–5.3)
LYMPHOCYTES NFR BLD AUTO: 16 %
MAGNESIUM SERPL-MCNC: 1.7 MG/DL (ref 1.7–2.3)
MCH RBC QN AUTO: 29 PG (ref 26.5–33)
MCH RBC QN AUTO: 29.3 PG (ref 26.5–33)
MCHC RBC AUTO-ENTMCNC: 30.4 G/DL (ref 31.5–36.5)
MCHC RBC AUTO-ENTMCNC: 32.2 G/DL (ref 31.5–36.5)
MCV RBC AUTO: 91 FL (ref 78–100)
MCV RBC AUTO: 95 FL (ref 78–100)
MONOCYTES # BLD AUTO: 0.5 10E3/UL (ref 0–1.3)
MONOCYTES NFR BLD AUTO: 8 %
NEUTROPHILS # BLD AUTO: 4.7 10E3/UL (ref 1.6–8.3)
NEUTROPHILS NFR BLD AUTO: 71 %
NRBC # BLD AUTO: 0 10E3/UL
NRBC BLD AUTO-RTO: 0 /100
PLATELET # BLD AUTO: 262 10E3/UL (ref 150–450)
PLATELET # BLD AUTO: 289 10E3/UL (ref 150–450)
POTASSIUM SERPL-SCNC: 3.9 MMOL/L (ref 3.4–5.3)
PROT SERPL-MCNC: 6.5 G/DL (ref 6.4–8.3)
RBC # BLD AUTO: 2.97 10E6/UL (ref 4.4–5.9)
RBC # BLD AUTO: 3.21 10E6/UL (ref 4.4–5.9)
SODIUM SERPL-SCNC: 140 MMOL/L (ref 136–145)
WBC # BLD AUTO: 6.5 10E3/UL (ref 4–11)
WBC # BLD AUTO: 8.5 10E3/UL (ref 4–11)

## 2022-11-02 PROCEDURE — 250N000013 HC RX MED GY IP 250 OP 250 PS 637

## 2022-11-02 PROCEDURE — 36592 COLLECT BLOOD FROM PICC: CPT | Performed by: STUDENT IN AN ORGANIZED HEALTH CARE EDUCATION/TRAINING PROGRAM

## 2022-11-02 PROCEDURE — 250N000013 HC RX MED GY IP 250 OP 250 PS 637: Performed by: INTERNAL MEDICINE

## 2022-11-02 PROCEDURE — 85014 HEMATOCRIT: CPT | Performed by: STUDENT IN AN ORGANIZED HEALTH CARE EDUCATION/TRAINING PROGRAM

## 2022-11-02 PROCEDURE — 214N000001 HC R&B CCU UMMC

## 2022-11-02 PROCEDURE — 85027 COMPLETE CBC AUTOMATED: CPT

## 2022-11-02 PROCEDURE — 99232 SBSQ HOSP IP/OBS MODERATE 35: CPT | Mod: GC | Performed by: INTERNAL MEDICINE

## 2022-11-02 PROCEDURE — 80053 COMPREHEN METABOLIC PANEL: CPT | Performed by: STUDENT IN AN ORGANIZED HEALTH CARE EDUCATION/TRAINING PROGRAM

## 2022-11-02 PROCEDURE — 36592 COLLECT BLOOD FROM PICC: CPT

## 2022-11-02 PROCEDURE — 250N000013 HC RX MED GY IP 250 OP 250 PS 637: Performed by: STUDENT IN AN ORGANIZED HEALTH CARE EDUCATION/TRAINING PROGRAM

## 2022-11-02 PROCEDURE — 999N000007 HC SITE CHECK

## 2022-11-02 PROCEDURE — 85610 PROTHROMBIN TIME: CPT

## 2022-11-02 PROCEDURE — 250N000011 HC RX IP 250 OP 636: Performed by: INTERNAL MEDICINE

## 2022-11-02 PROCEDURE — 83735 ASSAY OF MAGNESIUM: CPT | Performed by: INTERNAL MEDICINE

## 2022-11-02 RX ORDER — LISINOPRIL 5 MG/1
5 TABLET ORAL DAILY
Status: DISCONTINUED | OUTPATIENT
Start: 2022-11-02 | End: 2022-11-02

## 2022-11-02 RX ORDER — BENZOCAINE/MENTHOL 6 MG-10 MG
LOZENGE MUCOUS MEMBRANE 2 TIMES DAILY
Status: DISCONTINUED | OUTPATIENT
Start: 2022-11-02 | End: 2022-11-03 | Stop reason: HOSPADM

## 2022-11-02 RX ORDER — WARFARIN SODIUM 7.5 MG/1
7.5 TABLET ORAL
Status: COMPLETED | OUTPATIENT
Start: 2022-11-02 | End: 2022-11-02

## 2022-11-02 RX ORDER — FUROSEMIDE 20 MG
20 TABLET ORAL DAILY PRN
Qty: 30 TABLET | Refills: 0 | Status: ON HOLD | OUTPATIENT
Start: 2022-11-02 | End: 2023-03-26

## 2022-11-02 RX ORDER — LISINOPRIL 10 MG/1
10 TABLET ORAL DAILY
Status: DISCONTINUED | OUTPATIENT
Start: 2022-11-03 | End: 2022-11-03 | Stop reason: HOSPADM

## 2022-11-02 RX ORDER — BENZOCAINE/MENTHOL 6 MG-10 MG
LOZENGE MUCOUS MEMBRANE 2 TIMES DAILY
Status: DISCONTINUED | OUTPATIENT
Start: 2022-11-02 | End: 2022-11-02

## 2022-11-02 RX ORDER — LOPERAMIDE HCL 2 MG
2 CAPSULE ORAL 4 TIMES DAILY PRN
Status: DISCONTINUED | OUTPATIENT
Start: 2022-11-02 | End: 2022-11-03 | Stop reason: HOSPADM

## 2022-11-02 RX ADMIN — TAMSULOSIN HYDROCHLORIDE 0.4 MG: 0.4 CAPSULE ORAL at 08:08

## 2022-11-02 RX ADMIN — HYDROXYCHLOROQUINE SULFATE 200 MG: 200 TABLET, FILM COATED ORAL at 08:08

## 2022-11-02 RX ADMIN — ACETAMINOPHEN 650 MG: 325 TABLET, FILM COATED ORAL at 15:56

## 2022-11-02 RX ADMIN — LISINOPRIL 5 MG: 5 TABLET ORAL at 05:58

## 2022-11-02 RX ADMIN — HYDRALAZINE HYDROCHLORIDE 25 MG: 25 TABLET, FILM COATED ORAL at 05:58

## 2022-11-02 RX ADMIN — HYDRALAZINE HYDROCHLORIDE 25 MG: 25 TABLET, FILM COATED ORAL at 19:56

## 2022-11-02 RX ADMIN — FAMOTIDINE 20 MG: 20 TABLET ORAL at 19:55

## 2022-11-02 RX ADMIN — Medication 5 MG: at 19:55

## 2022-11-02 RX ADMIN — WARFARIN SODIUM 7.5 MG: 7.5 TABLET ORAL at 17:35

## 2022-11-02 RX ADMIN — HYDROXYZINE HYDROCHLORIDE 50 MG: 50 TABLET ORAL at 19:55

## 2022-11-02 RX ADMIN — HYDROXYCHLOROQUINE SULFATE 200 MG: 200 TABLET, FILM COATED ORAL at 19:55

## 2022-11-02 RX ADMIN — ATORVASTATIN CALCIUM 40 MG: 40 TABLET, FILM COATED ORAL at 19:55

## 2022-11-02 RX ADMIN — FAMOTIDINE 20 MG: 20 TABLET ORAL at 08:08

## 2022-11-02 RX ADMIN — DIGOXIN 125 MCG: 125 TABLET ORAL at 08:08

## 2022-11-02 RX ADMIN — HYDROCORTISONE: 1 CREAM TOPICAL at 17:39

## 2022-11-02 RX ADMIN — Medication 1500 MG: at 08:09

## 2022-11-02 RX ADMIN — ACETAMINOPHEN 650 MG: 325 TABLET, FILM COATED ORAL at 00:03

## 2022-11-02 ASSESSMENT — ACTIVITIES OF DAILY LIVING (ADL)
ADLS_ACUITY_SCORE: 33

## 2022-11-02 NOTE — PLAN OF CARE
Patient admitted 10/27 with nausea, vomiting and a purulent drainage from his LVAD driveline site. Hx. SLE, CAD, HFrEF secondary to ischemic cardiomyopathy s/p LVAD implantation 7/22 and recent Covid infection.    Neuro: A/Ox4.  Cardiac: SR 1st degree AVB, BBB with HR 80's. No LVAD alarms, MAPS in 90's. Parameters changed on MAR for hydralazine administration. Per MD this am give po hydralazine and lisinopril early, not IV hydralazine.    Respiratory: O2 sats stable on RA  GI/: Voiding in urinal at bedside, loose BM yesterday  Diet/appetite: 2 GM NA diet, 2L FR   Activity: Up with SBA  Pain: Tylenol for shoulder pain with relief.  Skin: Intact, no new deficits noted. Driveline dressing CDI.   LDA's: PICC HL'd, Driveline CDI    Plan: To discharge to [facility] today and back to [place] Saturday. Continue with POC. Notify primary team with changes.

## 2022-11-02 NOTE — PROGRESS NOTES
[facility]    Progress Note - Cardiology Service        Date of Admission:  10/27/2022    Assessment & Plan   Dandy Sands is a 62 year old male PMH of SLE, CAD, HFrEF secondary to ischemic cardiomyopathy s/p LVAD implantation and recent Covid infection admitted on 10/27/2022 who presents with nausea, vomiting and a purulent drainage from his LVAD driveline site.     Patient remains inpatient for ongoing management and work-up of drive line infection. Outpatient ID follow up has been arranged in [place] as of 11/1. Plan for patient to discharge on 11/3 back to [facility] with PICC line for IV vancomycin, then return to [place] to stay with his son over the coming weekend.     Changes  - Increase lisinorpil to PTA dose of 10mg  - Restart PTA loperamide 2mg   - Hydrocortisone 1% for external hemorrhoid  - Due to concern for BBPR will stay one more night for observation    #Drive line infection  #Stage D HFrEF 2/2 ICM (EF 10-15%) s/p HM3 LVAD (7/28/22) and CRT-D ('06)  #CAD s/p PCI to LAD ('05)  The patient presented with fever, elevated CRP, weakness, loss of appetite and difficulty tolerating oral intake. Family noted purulence while changing the LVAD site dressing two days ago. Doxycycline started with minimal symptom relief. Admission CT CAP reveals persistent mediastinal mass with extensive inflammation around the driveline. Of note, patient with history of ICM with EF of 10-15% and underwent a high risk multivessel PCI in June after which he went into cardiogenic shock requiring LVAD support and temporary RVAD. LVAD alarms on 10/30 night resolved with administration of vasodilator.   - LVAD: LVAD speed 5100 (reduced from 5200 9/19/2022)  - Volume status: Euvolemic  - Dry weight: 134  - Weight on admission: 140  - GDMT              > BB: discontinued during a previous admission, continue to hold              > ACEi/ARB/ARNi: Continue PTA lisinopril 10 mg  daily              > MRA: None              > SGLT2i: None   - Continue PTA hydralazine 25 mg TID  - SCD ppx: s/p CRT-D (2006)  - Continue Warfarin  - Continue pta digoxin 125 mcg daily  - Continue pta atorvastatin 40 mg daily  - Continue daily standing weights  - Continue IV Vanc, d/c Zosyn (10/28-10/30)  - Patient to discharge on IV vancomycin, treatment duration 2-4 weeks.  - Infectious disease consulted, appreciate recs  - CVTS consulted, appreciate recs     Culture Results:  - 10/28 MRSA Nares Negative  - 10/28 Aerobic Wound culture 2+ Corynebacterium  - 10/28 Urine culture <50k Proteus  - 10/27 Blood culture NGTD  - 10/27 Blood culture: NGTD  - 10/26 Blood culture NGTD  - 10/26 Blood culture: NGTD  - 10/26 Blood culture: NGTD  - 10/26 Anaerobic wound culture: NGTD  - 10/26 Aerobic wound culture 1+ Corynebacterium     #Severe malnutrition in the context of chronic illness  -Snacks  -Encourage intake of tolerated foods/beverages which do not exacerbate his loose stools.   - Multivitamin with minerals if inadequate oral intake.    #Diarrhea  #Recent C Diff Infection  #External Hemorrhoid  The patient was recently diagnosed with C Diff and has completed treatment with oral vancomycin. Currently he has watery diarrhea, onset two days ago w/o cramping. On 11/2/22 the patient had an episode of bloody diarrhea and digital rectal exam showed a prominent external hemorrhoid without any blood in the rectum  - 1% hydrocortisone cream for hemorrhoid.   - C.diff and enteric stool panel consistent with colonization  - Per ID ok to discontinue oral vancomycin for prophylaxis     #SLE  #Antiphospholipid syndrome   Follows with rheumatology as outpatient, recent 10/25/22 evaluation notable for no evidence of active autoimmunity or systemic inflammatory disease. Cellcept was discontinued and the patient was to stay only on hydroxychloroquine. However, due to recurrent hospitalizations the patient had minimal exposure to cellcept  and this does not represent an abrupt cessation, therefore association between high inflammatory state and autoimmunity is trivial.  - Continue PTA hydroxychloroquine     #GERD  - Continue PTA famotidine     #Hyperlipidemia  - Continue PTA atorvastatin     #Depression  #MIGUEL  - Holding PTA sertraline due to worsening diarrhea     #BPH  - Continue pta tamsulosin     Diet: Fluid restriction 2000 ML FLUID  2 Gram Sodium Diet  Snacks/Supplements Adult: Other; cottage cheese with grapes and pepper x2 @ 10; Between Meals  Diet    DVT Prophylaxis: Warfarin  Fitzgerald Catheter: Not present  Fluids: None  Central Lines: PRESENT  PICC 11/01/22 Single Lumen Right Brachial vein lateral Antibiotic. PICC is OK to use.-Site Assessment: WDL  Cardiac Monitoring: ACTIVE order. Indication: Acute decompensated heart failure (48 hours)  Code Status: Full Code      Disposition Plan      Expected Discharge Date: 11/02/2022, 12:00 PM    Destination: home          The patient's care was discussed with the Attending Physician, Dr. Asekw.    Iesha Harper MD  Hospitalist Service  [facility]  Securely message with the Vocera Web Console (learn more here)  Text page via [employer] Paging/Directory             # Hypoalbuminemia: Lowest albumin = 2.9 g/dL (Ref range: 3.5-5.2) at 11/2/2022  6:11 AM, will monitor as appropriate           # Severe Malnutrition: based on nutrition assessment    ____________________________________________________________________    Interval History   No acute events overnight. Yesterday's notes reviewed AM. Patient was getting ready for discharge, however     Data reviewed today: I reviewed all medications, new labs and imaging results over the last 24 hours. I personally reviewed no images or EKG's today.    Physical Exam   Vital Signs: Temp: 98.1  F (36.7  C) Temp src: Oral BP: 105/83 Pulse: 90   Resp: 17 SpO2: 98 % O2 Device: None (Room air)    Weight: 137 lbs 14.4 oz      General Appearance: AAOx3, NAD, appears comfortable  Respiratory: CTAB, no crackles, rales or wheezing  Cardiovascular: LVAD hum appreciated, S1, S2, no mrg, no EDMAR, appears euvolemic  GI: Soft, nt, nd, bs (+), driveline appreciated in the RLQ, digital exam with some dried blood in the anus, prominent external hemorrhoid at 4 o'clock. Rectal exam without any internal hemorrhoid, painless. No blood was identified in the rectum.  Skin: No rash appreciated  Other: AAOx3, moves freely all extremities, appears comfortable.    Data   Recent Labs   Lab 11/02/22  0611 11/01/22  0755 11/01/22  0754 10/31/22  0641   WBC 6.5 6.3  --  5.9   HGB 8.6* 8.8*  --  8.6*   MCV 95 92  --  92    251  --  223   INR 2.38*  --  2.50* 2.66*     --  139 136   POTASSIUM 3.9  --  4.0 3.7   CHLORIDE 109*  --  106 104   CO2 20*  --  20* 21*   BUN 11.0  --  12.6 13.1   CR 0.57*  --  0.68 0.71   ANIONGAP 11  --  13 11   ELDA 8.8  --  8.8 8.8   GLC 98  --  96 82   ALBUMIN 2.9*  --  3.1* 3.2*   PROTTOTAL 6.5  --  6.8 6.9   BILITOTAL 0.3  --  0.3 0.4   ALKPHOS 117  --  118 122   ALT 9*  --  8* 7*   AST 20  --  18 17

## 2022-11-02 NOTE — PLAN OF CARE
"  Problem: Plan of Care - These are the overarching goals to be used throughout the patient stay.    Goal: Plan of Care Review  Description: The Plan of Care Review/Shift note should be completed every shift.  The Outcome Evaluation is a brief statement about your assessment that the patient is improving, declining, or no change.  This information will be displayed automatically on your shift note.  Outcome: Adequate for Care Transition  Goal: Patient-Specific Goal (Individualized)  Description: You can add care plan individualizations to a care plan. Examples of Individualization might be:  \"Parent requests to be called daily at 9am for status\", \"I have a hard time hearing out of my right ear\", or \"Do not touch me to wake me up as it startles me\".  Outcome: Adequate for Care Transition  Goal: Absence of Hospital-Acquired Illness or Injury  Outcome: Adequate for Care Transition  Intervention: Identify and Manage Fall Risk  Recent Flowsheet Documentation  Taken 11/2/2022 0800 by Nicole Rivers RN  Safety Promotion/Fall Prevention:   activity supervised   assistive device/personal items within reach   clutter free environment maintained   fall prevention program maintained   increased rounding and observation   nonskid shoes/slippers when out of bed   patient and family education  Intervention: Prevent Skin Injury  Recent Flowsheet Documentation  Taken 11/2/2022 0800 by Nicole Rivers RN  Body Position: position changed independently  Intervention: Prevent and Manage VTE (Venous Thromboembolism) Risk  Recent Flowsheet Documentation  Taken 11/2/2022 0800 by Nicole Rivers RN  VTE Prevention/Management: SCDs (sequential compression devices) off  Intervention: Prevent Infection  Recent Flowsheet Documentation  Taken 11/2/2022 0800 by Nicole Rivers RN  Infection Prevention:   cohorting utilized   environmental surveillance performed   equipment surfaces disinfected   hand hygiene promoted   personal " protective equipment utilized   rest/sleep promoted   single patient room provided  Goal: Optimal Comfort and Wellbeing  Outcome: Adequate for Care Transition  Goal: Readiness for Transition of Care  Outcome: Adequate for Care Transition   Goal Outcome Evaluation:

## 2022-11-02 NOTE — PROGRESS NOTES
Care Management Discharge Note    Expected Discharge Date: 11/02/2022     Concerns to be Addressed: Discharge planning    Patient plan of care discussed at interdisciplinary rounds: Yes     Anticipated Discharge Disposition: [place] until 11/5 and then to son's home in [place], [place].     SonAmos's home address:  [address] [place] [address]     Anticipated Discharge Services: Home Infusion,Home Care  Anticipated Discharge DME: None     Education Provided on the Discharge Plan: Yes  Patient/Family in Agreement with the Plan: Yes     Referrals Placed by CM/SW: Home Infusion   Private pay costs discussed: Not applicable    Education Provided on the Discharge Plan: Yes   Persons Notified of Discharge Plans: Pt, RN, Home Infusion, Son Amos   Patient/Family in Agreement with the Plan: Yes    Additional Information:  Per MD, pt medically ready for discharge. This writer updated  Home Infusion liason. [facility] will not re-open pt to home since he is discharging back to [place] this weekend. [facility] working on coordinating for one of their nurses to do the education at [place] tomorrow.    1130: Received update from [facility] liason that they have secured home care (RN/PT/OT) through [facility], orders placed. [facility] will deliver drug/supplies directly to [place] this evening. [facility] nurse will see patient and family in am for teaching.  Pt and son updated.    Previously cheduled the following follow ups:  [facility]  [address]   [place] [address]  Pt scheduled on Weds, 11/9 at 9:45am with Dr. Goode.   Dr. Baca updated and will call to do sign out.      [facility]  [address] [place], [place]  Phone: [phone]  Fax: [phone]  Pt scheduled on Monday, 11/7 at 1pm with Dr. Austin Sierra for establishment of care and post hospitalization follow up.     [facility] (RN/PT/OT)     [facility] (Home IV abx)  Phone:  [phone]    1510: Received update that pt no longer discharging today due to concern for bleeding hemorrhoids, will observe overnight. Updated [facility] liason. They will just need pt to receive his am vancomycin dose tomorrow prior to discharge and then will plan for a nurse visit at [place] on Friday morning.    CC will continue to monitor patient's medical condition and progress towards discharge.  Taylor Schmitz RN on 11/2/2022 at 1:24 PM

## 2022-11-02 NOTE — PHARMACY
[facility], [facility]  Parenteral ANtibiotic Review at Departure from Acute [facility]     Antimicrobial Stewardship Program - A joint venture between [facility] and  Physicians to optimize antibiotic management.  NOT a formal consult - Restricted Antimicrobial Review     Patient: Dandy Sands  MRN: [id number]  Allergies: Patient has no known allergies.    Brief Summary: Dandy Sands is a 62 year old male with a history of SLE on hydroxychloroquine, antiphospholipid ab syndrome on warfarin, CAD, HFrEF due to ICM s/p LVAD implantation (7/8/22), COVID-19 (10/13/22), C.diff (9/7/22), and chronic medication-related diarrhea who presented 10/27/22 with nausea, vomiting, and purulent drainage from LVAD driveline starting 10/25.  On 10/28, Corynebacterium striatum was isolated from the driveline insertion site.  It is thought it is a fairly superficial infection but there are concerns for a deeper infection given systemic symptoms on admission (fever, chills).  The plan is to discharge on IV antibiotics due to concerns for oral absorption with persistent diarrhea.  Patient plans to leave for [place] on 11/7/22 and will need an ID provider at [place] to prescribe IV antibiotics for LVAD driveline infection.      Antimicrobial Dose/Route/Frequency Duration/Indication Start Date End Date   Vancomycin IV 1500/IV/every 24 hours 2-4 weeks/ (LVAD Driveline infection) 10/28/2022 Definitive end date to be determined by outpatient ID provider       Laboratory Tests: CBC with Diff, CMP   Lab Monitoring Frequency: 1x week   Therapeutic Drug Monitoring:  (Vancomycin serum level -goal -600, pharmacy may dose)   Therapeutic Drug Monitoring Frequency: 1x week ((May increase serum vancomycin level frequency    with regimen changes, labile renal function, and/or addition of nephrotoxic medications)   Miscellaneous Drug Monitoring:  None     Line Type:  PICC    Reassess Line/Pull Line Date: PICC pull line date to be determined by ID provider    First Dose Received in Controlled Setting: Yes    Designated Provider: Dr. Shania Baca    Follow-up: Patient is scheduled for a virtual visit with Loretta Romero on 11/4/22 at 9:00 AM after which the plan is to have follow-up appointments with an ID provider in [place], [place] per ID note on 10/31.    Recommendations/Additional Information:   Please fax laboratory results to [facility] ([phone]) attn: Jonathan Lucas    Discussed with Jo Mcleod, SaravananD, BCITOR Taylor PharmD  Pager: [phone]    Vital Signs/Clinical Features:  Vitals         10/31 0700  11/01 0659 11/01 0700  11/02 0659 11/02 0700  11/02 1226   Most Recent      Temp ( F) 97.5 -  97.9    97.5 -  98    97.7 -  98.1     98.1 (36.7) 11/02 1100    Pulse 83 -  93    83 -  96    90 -  94     90 11/02 1100    Resp 16 -  18    16 -  18      17     17 11/02 1100    BP 93/74 -  104/86    99/84 -  101/89    74/62 -  115/87     115/87 11/02 1100    SpO2 (%) 97 -  98    97 -  98      98     98 11/02 1100            Labs  Estimated Creatinine Clearance: 120.5 mL/min (A) (based on SCr of 0.57 mg/dL (L)).  Recent Labs   Lab Test 10/29/22  0615 10/30/22  0653 10/30/22  1438 10/31/22  0641 11/01/22  0754 11/02/22  0611   CR 0.86 0.87 0.90 0.71 0.68 0.57*       Recent Labs   Lab Test 07/18/22  2118 07/19/22  0322 07/19/22  0946 07/19/22  1601 07/19/22  2049 07/20/22  0355 07/20/22  0949 07/20/22  1523 10/28/22  0852 10/29/22  0615 10/30/22  0653 10/31/22  0641 11/01/22  0755 11/02/22  0611   WBC 19.0* 15.5* 18.5*   < > 22.4* 13.9* 15.9*   < > 15.3* 12.6* 7.1 5.9 6.3 6.5   ANEU 17.5* 12.2* 16.3*  --  19.5* 11.8* 14.6*  --   --   --   --   --   --   --    ALYM 0.4* 1.4 0.7*  --  1.3 1.0 0.2*  --   --   --   --   --   --   --    GERALDO 0.4 0.5 0.7  --  0.4 0.4 0.5  --   --   --   --   --   --   --    AEOS 0.4 0.3 0.2  --  0.4 0.1 0.5  --   --   --   --   --    --   --    HGB 7.6* 7.0* 8.3*   < > 8.2* 8.1* 8.5*   < > 9.3* 8.7* 8.4* 8.6* 8.8* 8.6*   HCT 23.4* 21.9* 25.5*   < > 26.0* 25.4* 27.0*   < > 30.8* 27.9* 26.7* 27.1* 28.2* 28.3*   MCV 91 92 89   < > 90 90 90   < > 94 92 92 92 92 95    155 164   < > 200 157 150   < > 264 234 203 223 251 262    < > = values in this interval not displayed.       Recent Labs   Lab Test 10/28/22  0852 10/29/22  0615 10/30/22  0653 10/31/22  0641 11/01/22  0754 11/02/22  0611   BILITOTAL 0.7 0.7 0.4 0.4 0.3 0.3   ALKPHOS 108 106 116 122 118 117   ALBUMIN 3.2* 3.2* 3.0* 3.2* 3.1* 2.9*   AST 26 18 18 17 18 20   ALT 10 6* 7* 7* 8* 9*       Recent Labs   Lab Test 06/19/22  1522 06/19/22  2157 08/19/22  0642 08/20/22  0520 08/21/22  0510 09/04/22  2234 09/06/22  1855 09/18/22  1938 10/16/22  0640 10/17/22  0534 10/18/22  0606 10/26/22  1604 10/27/22  2212 10/28/22  0045 10/28/22  0533 10/28/22  1957   PCAL  --   --   --   --   --   --   --   --   --   --   --   --   --  0.07*  --   --    LACT 1.1   < > 0.5* 0.8 0.8  --  1.0  --   --   --   --   --  1.3  --   --  1.6   CRP 28.0*   < >  --   --   --    < >  --    < > 70.80* 50.30* 29.70* 83.70* 86.70*  --  111.00*  --    SED 39*  --   --   --   --   --   --   --   --   --   --   --   --   --   --   --     < > = values in this interval not displayed.       Recent Labs   Lab Test 11/01/22  0754   VANCOMYCIN 15.1       Culture Results:  7-Day Micro Results       Procedure Component Value Units Date/Time    C. difficile Toxin B PCR with reflex to C. difficile Antigen and Toxins A/B EIA [[id number]]  (Abnormal) Collected: 10/29/22 0519    Order Status: Completed Lab Status: Final result Updated: 10/29/22 0702    Specimen: Stool from Per Rectum      C Difficile Toxin B by PCR Positive     Comment: Detection of C. difficile nucleic acid in stools confirms the presence of these organisms in diarrheal patients but may not indicate that C. difficile is the etiologic agent of the diarrhea. Results  from the Xpert C. difficile assay should be interpreted in conjunction with other laboratory and clinical data available to the clinician.    Patients with a positive C. difficile PCR result will receive reflex GDH/Toxin Immunoassay testing. Please interpret the PCR test result in conjunction with GDH/Toxin Immunoassay results and the clinical status of patient.       Narrative:      The [employer] Xpert C. difficile Assay, performed on the [employer]  Instrument Systems, is a qualitative in vitro diagnostic test for rapid detection of toxin B gene sequences from unformed (liquid or soft) stool specimens collected from patients suspected of having Clostridioides difficile infection (CDI). The test utilizes automated real-time polymerase chain reaction (PCR) to detect toxin gene sequences associated with toxin producing C. difficile. The Xpert C. difficile Assay is intended as an aid in the diagnosis of CDI.    C. difficile Antigen and Toxins A/B by Enzyme Immunoassay [[id number]]  (Abnormal) Collected: 10/29/22 0519    Order Status: Completed Lab Status: Final result Updated: 10/29/22 5596    Specimen: Stool from Per Rectum      C. difficile GDH Antigen Positive     C. difficile Toxin Negative    Narrative:      C. difficile GDH antigen detected and C. difficile toxin not detected by enzyme immunoassay. These results indicate the organism is present and the toxin is absent or below the limit of detection. Results must be interpreted based on clinical findings.    Wound Aerobic Bacterial Culture Routine [[id number]]  (Abnormal)  (Susceptibility) Collected: 10/28/22 1459    Order Status: Completed Lab Status: Preliminary result Updated: 11/01/22 1630    Specimen: Wound from Abdomen      Culture 2+ Corynebacterium striatum     Comment: Identification obtained by MALDI-TOF mass spectrometry research use only database. Test characteristics determined and verified by the Infectious Diseases Diagnostic  Laboratory.Susceptibilities not routinely done       Narrative:      Susceptibility testing requested by Shania Hutchinson. Please do usual sens and include Vanco, Dapto, Doxy, Linezolid,Penicillin. [phone].        Susceptibility testing requested by again by Dr. Shania Baca [phone]. Asking for Minocycline.     Susceptibility       Corynebacterium striatum (1)       Antibiotic Interpretation Sensitivity   Method Status    Penicillin Resistant 6 ug/mL ANIKA Final    Ceftriaxone Resistant 96 ug/mL ANIKA Final    Clindamycin Resistant >256 ug/mL ANIKA Final    Trimethoprim/Sulfamethoxazole Resistant 8.000 ug/mL ANIKA Final    Vancomycin Susceptible 0.50 ug/mL ANIKA Final    Daptomycin Susceptible 0.094 ug/mL ANIKA Final    Doxycycline Intermediate 8 ug/mL ANIKA Final    Linezolid Susceptible 0.38 ug/mL ANIKA Final                       Urine Culture Aerobic Bacterial [[id number]]  (Abnormal)  (Susceptibility) Collected: 10/28/22 0926    Order Status: Completed Lab Status: Final result Updated: 10/30/22 0049    Specimen: Urine, Midstream      Culture 10,000-50,000 CFU/mL Proteus mirabilis    Susceptibility       Proteus mirabilis (1)       Antibiotic Interpretation Sensitivity   Method Status    Ampicillin Susceptible <=2 ug/mL ANIKA Final    Ampicillin/ Sulbactam Susceptible <=2 ug/mL ANIKA Final    Piperacillin/Tazobactam Susceptible <=4 ug/mL ANIKA Final    Cefazolin Susceptible <=4 ug/mL ANIKA Final     Cefazolin ANIKA breakpoints are for the treatment of uncomplicated urinary tract infections. For the treatment of systemic infections, please contact the laboratory for additional testing.       Cefoxitin Susceptible <=4 ug/mL ANIKA Final    Ceftazidime Susceptible <=1 ug/mL ANIKA Final    Ceftriaxone Susceptible <=1 ug/mL ANIKA Final    Cefepime Susceptible <=1 ug/mL ANIKA Final    Meropenem  [*]  Susceptible <=0.25 ug/mL ANIKA Final    Amikacin  [*]  Susceptible <=2 ug/mL ANIKA Final    Gentamicin Susceptible <=1 ug/mL ANIKA Final     Tobramycin Susceptible <=1 ug/mL ANIKA Final    Ciprofloxacin Susceptible <=0.25 ug/mL ANIKA Final    Levofloxacin Susceptible <=0.12 ug/mL ANIKA Final    Nitrofurantoin Resistant 128 ug/mL ANIKA Final     Intrinsically Resistant       Trimethoprim/Sulfamethoxazole Susceptible <=1/19 ug/mL ANIKA Final               [*]  Suppressed Antibiotic                   MRSA MSSA PCR, Nasal Swab [[id number]] Collected: 10/28/22 0925    Order Status: Completed Lab Status: Final result Updated: 10/28/22 1135    Specimen: Swab from Nares, Bilateral      MRSA Target DNA Negative     SA Target DNA Negative    Narrative:      The [employer]  Xpert SA Nasal Complete assay performed in the [employer]  Dx System is a qualitative in vitro diagnostic test designed for rapid detection of Staphylococcus aureus (SA) and methicillin-resistant Staphylococcus aureus (MRSA) from nasal swabs in patients at risk for nasal colonization. The test utilizes automated real-time polymerase chain reaction (PCR) to detect MRSA/SA DNA. The Xpert SA Nasal Complete assay is intended to aid in the prevention and control of MRSA/SA infections in healthcare settings. The assay is not intended to diagnose, guide or monitor treatment for MRSA/SA infections, or provide results of susceptibility to methicillin. A negative result does not preclude MRSA/SA nasal colonization.     Enteric Bacteria and Virus Panel by DI Stool     Order Status: Canceled Lab Status: No result     Specimen: Stool from Per Rectum     Blood Culture Peripheral Blood [[id number]]  (Normal) Collected: 10/27/22 2212    Order Status: Completed Lab Status: Final result Updated: 11/01/22 2346    Specimen: Peripheral Blood      Culture No Growth    Blood Culture Peripheral Blood [[id number]]  (Normal) Collected: 10/27/22 2212    Order Status: Completed Lab Status: Final result Updated: 11/01/22 2346    Specimen: Peripheral Blood      Culture No Growth    Blood Culture Arm, Left [[id number]]  (Normal)  Collected: 10/26/22 1615    Order Status: Completed Lab Status: Final result Updated: 10/31/22 1846    Specimen: Blood from Arm, Left      Culture No Growth    Blood Culture Arm, Right [[id number]]  (Normal) Collected: 10/26/22 1604    Order Status: Completed Lab Status: Final result Updated: 10/31/22 1846    Specimen: Blood from Arm, Right      Culture No Growth    Anaerobic Bacterial Culture Routine [[id number]] Collected: 10/26/22 1518    Order Status: Completed Lab Status: Final result Updated: 11/02/22 0743    Specimen: Wound from Abdomen      Culture No anaerobic organisms isolated    Wound Aerobic Bacterial Culture Routine with Gram Stain [[id number]]  (Abnormal) Collected: 10/26/22 1518    Order Status: Completed Lab Status: Final result Updated: 10/29/22 0749    Specimen: Wound from Abdomen      Culture 1+ Corynebacterium striatum     Comment: Identification obtained by MALDI-TOF mass spectrometry research use only database. Test characteristics determined and verified by the Infectious Diseases Diagnostic Laboratory.Susceptibilities not routinely done        Gram Stain Result No organisms seen      No white blood cells seen            Recent Labs   Lab Test 07/21/22  2239 08/03/22  1110 08/31/22  1542 09/10/22  1222 09/19/22  0023 10/28/22  0926   URINEPH 5.5 7.0 5.5 5.5 5.5 6.0   NITRITE Negative Negative Negative Negative Negative Negative   LEUKEST Negative Negative Negative Negative Negative Negative   WBCU 1 21* 4  --  <1 1                   Recent Labs   Lab Test 10/29/22  0519   CDBPCT Positive*       Imaging: CT Chest/Abdomen/Pelvis w Contrast    Result Date: 10/28/2022  EXAMINATION: CT CHEST/ABDOMEN/PELVIS W CONTRAST, 10/27/2022 3:47 PM TECHNIQUE:  Helical CT images from the thoracic inlet through the symphysis pubis were obtained with IV contrast. Contrast dose: Isovue 370 78cc COMPARISON: CT abdomen and pelvis 9/18/2022. CT chest, abdomen and pelvis 7/12/2022. CT chest 8/20/2022 and  5/16/2022. HISTORY: epigastric pain with an LVAD driveline; Chronic systolic congestive heart failure (H); LVAD (left ventricular assist device) present (H) FINDINGS: Devices: LVAD with patent outflow tract and trace encapsulated fluid about the drive line in the subcutaneous fat of the epigastric region. Left chest wall implantable cardiac defibrillator with stable positioning of leads. Coronary artery stenting. Right axillary vascular access cannula in stable position. CHEST: LUNGS: Central tracheobronchial tree is patent. No pneumothorax. Trace left pleural effusion. Centrilobular and paraseptal emphysematous changes. Mild subpleural predominant interstitial fibrosis. Scattered pulmonary nodules are unchanged, for example 3 mm solid nodule in the left lung apex (series 5, image 57). MEDIASTINUM: Cardiomegaly. Peripherally calcified left ventricular wall with associated subendocardial fat deposition, similar to prior. Thin pericardial effusion. Encapsulated intermediate attenuation anterior mediastinal fluid collection similar to prior measurement 4.5 x 2.1 cm axially. Ascending aorta and main pulmonary artery diameters are within normal limits. Normal appearance and configuration of the great vessels off of the aortic arch. Prominent axillary and mediastinal lymph nodes are not significantly changed. Visualized thyroid and esophagus are unremarkable. ABDOMEN/PELVIS: LIVER: No suspicious focal hepatic lesion. The liver measures up to 20.1 cm in the craniocaudal dimension. Continued mild diffuse heterogeneous parenchymal enhancement, likely related to congestive hepatopathy. BILIARY: Partially calcified appearance of the gallbladder wall versus adherent stones. No intrahepatic or extrahepatic biliary ductal dilation. PANCREAS: No focal pancreatic lesion. The main pancreatic duct is not dilated. SPLEEN: 1.2 cm hypodensity within the superior aspect of the spleen, likely trace fluid within a splenic cleft. ADRENAL  GLANDS: No focal adrenal nodule. URINARY TRACT: No suspicious renal lesion. No hydronephrosis or hydroureter. No renal stone. Urinary bladder is unremarkable. REPRODUCTIVE ORGANS: Within normal limits. STOMACH: Within normal limits. BOWEL: Normal caliber large and small bowel. No abnormal bowel wall thickening or enhancement. Appendix is nonvisualized. PERITONEUM/FLUID: Small amount of free fluid in the low pelvis. VESSELS: No aneurysmal dilatation of the abdominal aorta.  The portal, splenic, and superior mesenteric veins are patent.  The origins of the celiac and superior mesenteric arteries are patent. Mild atherosclerotic calcifications. Stable positioning of infrarenal IVC filter with slight tilting towards the posterior wall and extension of the struts beyond the vessel. LYMPH NODES: Prominent periaortic lymph nodes, not significantly changed. BONES/SOFT TISSUES: Mixed lytic and sclerotic appearing lesions within the femoral heads bilaterally, compatible with avascular necrosis, as seen previously. Median sternotomy wires. No cortical erosive changes of the sternum adjacent to previously described anterior mediastinal fluid collection. No acute osseous abnormalities. No suspicious soft tissue findings.     IMPRESSION: 1. Persistent collection in the anterior mediastinum measuring 4.5 x 2.1 cm, slowly decreasing since 8/20/2022. Findings favored to represent a loculated hematoma. Cannot rule out superimposed infection such as abscess, though this is not favored given lack of surrounding inflammatory fat stranding. 2. Nonspecific free fluid in the pelvis. No acute intra-abdominal findings. 3. Stable prominent axillary, mediastinal, and retroperitoneal lymph nodes, likely reactive. 4. Thin residual pericardial effusion and trace left pleural effusion. 5. Stable LVAD with patent outflow tract and trace encapsulated fluid about the drive line in the subcutaneous fat of the epigastric region. I have personally  reviewed the examination and initial interpretation and I agree with the findings. YESENIA KEITH DO   SYSTEM ID:  [id number]    XR Chest 2 Views    Result Date: 10/27/2022  EXAM: XR CHEST 2 VIEWS LOCATION: [facility] DATE/TIME: 10/27/2022 10:50 PM INDICATION: Fever; LVAD COMPARISON: 10/13/2022.     IMPRESSION: Lungs are clear. Chronic blunting of the right costophrenic angle. No acute pleural effusion. No pneumothorax. Unchanged cardiomegaly. Postsurgical changes of median sternotomy. Left chest wall pacer/defibrillator device is present. A left ventricular device is additionally noted, which appears in unchanged position. Surgical clips overlie the right axilla. An additional metallic density is partially imaged at the mid abdomen, incompletely evaluated.    XR Chest Port 1 View    Result Date: 11/1/2022  XR CHEST PORT 1 VIEW  11/1/2022 10:06 AM  HISTORY: 62-year-old male status post PICC placement. COMPARISON: 10/27/2022 FINDINGS: Portable 45 degree AP projection radiograph of the chest. Coronary stent. Right-sided PICC tip projects over the superior cavoatrial junction. Left chest wall pacer/defibrillator leads in stable position. LVAD unchanged position. Sternotomy wires intact. Trachea is midline. Cardiac silhouette is stable. Unchanged blunting of the right costophrenic angle. No appreciable pneumothorax. No focal airspace opacity.     IMPRESSION: 1.  PICC tip projects over the superior cavoatrial junction. 2.  No pneumothorax or focal air space opacities. I have personally reviewed the examination and initial interpretation and I agree with the findings. GABRIEL COURTNEY MD   SYSTEM ID:  [id number]    Cardiac Device Check - In Clinic    Result Date: 10/27/2022  Patient seen in the [facility] for evaluation and iterative programming of their ICD per MD orders. Patient was seen in clinic today for reset of audible alert tone reported by patient/family. Alert tone sounding for  "\"unsuccessful [facility] Alert Transmission\" of \"High RV Threshold\" on 10/27/22. Device: Medtronic [id number] Eliseo TIRADO DR Normal device function Mode: AAI/DDD /115 bpm AP: 0.1% : 0.2% Intrinsic rhythm: SR w/ PVCs @ 96 bpm Thoracic Impedance: Below reference line, suggesting possible intrathoracic fluid accumulation. Short V-V intervals: 0 Lead Trends: Acute change in RV capture threshold noted since VAD implant in July '22 and continues to remain high, measuring today at 2.75 V @ 1.0 ms. R waves today measuring at 0.9 mV. RV lead impedance appears stable at this time. Estimated battery longevity to RRT = 4.5 years. Battery voltage = 2.96 V. Atrial Arrhythmia: 2 AT/AF episodes lasting 19 hr 55 min - 36 hr 11 min, most recently occurring on 10/16/22. Stored EGMs show AT/AF with controlled vent response (80-90s bpm). AF [place]: 11.9% (since 10/7/22) Anticoagulant: warfarin Ventricular Arrhythmia: 1 VT-NS lasting 5 sec @ 186 bpm. Setting Changes: RV Capture Management programmed from \"Adaptive\" to OFF in setting of increased RV amplitude, with current programmed RV Amplitude: 5V @ 1.0 ms. Patient has an appointment to see Dr. Lora today. Plan: Send a remote transmission in 3 months (upon transfer of home monitor from previous device clinic). Follow-up with next available EP to address increase in RV amplitude following insertion of VAD.  KHANG Rodriguez RN Dual lead ICD I have reviewed and interpreted the device interrogation, settings, programming and nurse's summary. The device is functioning within normal device parameters. I agree with the current findings, assessment and plan.   "

## 2022-11-02 NOTE — PROGRESS NOTES
[id number] 11/2/2022    Patient is a 62 year old male admitted for fever/infection with a PMH of SLE, CAD, HFrEF secondary to ischemic cardiomyopathy s/p LVAD implantation and recent Covid infection admitted on 10/27/2022 who presents with nausea, vomiting and a purulent drainage from his LVAD driveline site. Patient remains inpatient for ongoing management and work-up of drive line infection. Outpatient ID follow up has been arranged in [place] as of 11/1. Team is cards 2. Patient is on enteric precautions.    Neuro: A&Ox4  Cardiac: Sinus rhythm with BBB; LVAD numbers WDL for patient, high PI is normal for him;Mg and K+ protocols; high MAPs overnight, lisinopril dose increased; MAP high again later morning, team notified   Respiratory: WDL; on room air; clear lungs  GI/: Hemorrhoid bleeding this morning, team aware; BM yesterday; uses bedside urinal  Endocrine: WDL  Diet/Appetite: 2 gram sodium diet; 2L fluid restriction  Skin: No overt skin issues noted  LDA: Left PIV; right single lumen PICC (going home with this)  Activity: Independent  Pain: No complaints of pain  Plan: Discharge to [facility] tomorrow, back home to [place] this Friday with home IV Vancomycin    - Bleeding hemorrhoid prevented discharge today; will monitor hemoglobin and bleeding over night; steroid cream added to MAR

## 2022-11-02 NOTE — PLAN OF CARE
D: Admitted 10/27 who presents with nausea, vomiting and a purulent drainage from his LVAD driveline site.     I: Monitored vitals and assessed pt status.   PRN: Tylenol, Atarax, Melatonin   Tele: Sinus rhythm, HR 80-90's   Mobility: SBA/up ind in room     A: AOx4. VSS. Afebrile. Urinating adequately. LBM today. R shoulder pain well controlled with Tylenol. LVAD #'s WDL, no alarms. Dressing change completed. PICC was placed today for home infusion, PLC teaching has been completed.       P: Continue to monitor Pt status and report changes to Cards 2. Plan to discharge back to [place] tomorrow, then home to [place] on Saturday.

## 2022-11-03 ENCOUNTER — TELEPHONE (OUTPATIENT)
Dept: ANTICOAGULATION | Facility: CLINIC | Age: 62
End: 2022-11-03

## 2022-11-03 ENCOUNTER — HOME INFUSION (PRE-WILLOW HOME INFUSION) (OUTPATIENT)
Dept: PHARMACY | Facility: CLINIC | Age: 62
End: 2022-11-03

## 2022-11-03 VITALS
OXYGEN SATURATION: 98 % | RESPIRATION RATE: 16 BRPM | HEIGHT: 67 IN | BODY MASS INDEX: 21.64 KG/M2 | WEIGHT: 137.9 LBS | SYSTOLIC BLOOD PRESSURE: 105 MMHG | TEMPERATURE: 97.8 F | DIASTOLIC BLOOD PRESSURE: 94 MMHG | HEART RATE: 93 BPM

## 2022-11-03 DIAGNOSIS — I50.20 HEART FAILURE WITH REDUCED EJECTION FRACTION, NYHA CLASS III (H): ICD-10-CM

## 2022-11-03 DIAGNOSIS — Z95.811 LEFT VENTRICULAR ASSIST DEVICE PRESENT (H): ICD-10-CM

## 2022-11-03 DIAGNOSIS — Z95.811 LVAD (LEFT VENTRICULAR ASSIST DEVICE) PRESENT (H): ICD-10-CM

## 2022-11-03 DIAGNOSIS — I50.22 CHRONIC SYSTOLIC CONGESTIVE HEART FAILURE (H): Primary | ICD-10-CM

## 2022-11-03 PROBLEM — K64.4 EXTERNAL HEMORRHOIDS: Chronic | Status: ACTIVE | Noted: 2022-11-03

## 2022-11-03 LAB
ALBUMIN SERPL BCG-MCNC: 3.3 G/DL (ref 3.5–5.2)
ALP SERPL-CCNC: 131 U/L (ref 40–129)
ALT SERPL W P-5'-P-CCNC: 12 U/L (ref 10–50)
ANION GAP SERPL CALCULATED.3IONS-SCNC: 9 MMOL/L (ref 7–15)
AST SERPL W P-5'-P-CCNC: 20 U/L (ref 10–50)
BASOPHILS # BLD AUTO: 0 10E3/UL (ref 0–0.2)
BASOPHILS NFR BLD AUTO: 1 %
BILIRUB SERPL-MCNC: 0.3 MG/DL
BUN SERPL-MCNC: 14.3 MG/DL (ref 8–23)
CALCIUM SERPL-MCNC: 8.6 MG/DL (ref 8.8–10.2)
CHLORIDE SERPL-SCNC: 108 MMOL/L (ref 98–107)
CREAT SERPL-MCNC: 0.7 MG/DL (ref 0.67–1.17)
DEPRECATED HCO3 PLAS-SCNC: 22 MMOL/L (ref 22–29)
EOSINOPHIL # BLD AUTO: 0.2 10E3/UL (ref 0–0.7)
EOSINOPHIL NFR BLD AUTO: 3 %
ERYTHROCYTE [DISTWIDTH] IN BLOOD BY AUTOMATED COUNT: 15.8 % (ref 10–15)
GFR SERPL CREATININE-BSD FRML MDRD: >90 ML/MIN/1.73M2
GLUCOSE SERPL-MCNC: 96 MG/DL (ref 70–99)
HCT VFR BLD AUTO: 27.5 % (ref 40–53)
HGB BLD-MCNC: 8.6 G/DL (ref 13.3–17.7)
IMM GRANULOCYTES # BLD: 0.1 10E3/UL
IMM GRANULOCYTES NFR BLD: 1 %
INR PPP: 2.3 (ref 0.85–1.15)
LYMPHOCYTES # BLD AUTO: 1.2 10E3/UL (ref 0.8–5.3)
LYMPHOCYTES NFR BLD AUTO: 15 %
MCH RBC QN AUTO: 28.2 PG (ref 26.5–33)
MCHC RBC AUTO-ENTMCNC: 31.3 G/DL (ref 31.5–36.5)
MCV RBC AUTO: 90 FL (ref 78–100)
MONOCYTES # BLD AUTO: 0.5 10E3/UL (ref 0–1.3)
MONOCYTES NFR BLD AUTO: 7 %
NEUTROPHILS # BLD AUTO: 5.7 10E3/UL (ref 1.6–8.3)
NEUTROPHILS NFR BLD AUTO: 73 %
NRBC # BLD AUTO: 0 10E3/UL
NRBC BLD AUTO-RTO: 0 /100
PLATELET # BLD AUTO: 265 10E3/UL (ref 150–450)
POTASSIUM SERPL-SCNC: 3.8 MMOL/L (ref 3.4–5.3)
PROT SERPL-MCNC: 6.8 G/DL (ref 6.4–8.3)
RBC # BLD AUTO: 3.05 10E6/UL (ref 4.4–5.9)
SODIUM SERPL-SCNC: 139 MMOL/L (ref 136–145)
VANCOMYCIN SERPL-MCNC: 14.4 UG/ML
WBC # BLD AUTO: 7.6 10E3/UL (ref 4–11)

## 2022-11-03 PROCEDURE — 85025 COMPLETE CBC W/AUTO DIFF WBC: CPT | Performed by: STUDENT IN AN ORGANIZED HEALTH CARE EDUCATION/TRAINING PROGRAM

## 2022-11-03 PROCEDURE — 80053 COMPREHEN METABOLIC PANEL: CPT | Performed by: STUDENT IN AN ORGANIZED HEALTH CARE EDUCATION/TRAINING PROGRAM

## 2022-11-03 PROCEDURE — 85610 PROTHROMBIN TIME: CPT

## 2022-11-03 PROCEDURE — 250N000013 HC RX MED GY IP 250 OP 250 PS 637

## 2022-11-03 PROCEDURE — 250N000011 HC RX IP 250 OP 636

## 2022-11-03 PROCEDURE — 258N000003 HC RX IP 258 OP 636: Performed by: INTERNAL MEDICINE

## 2022-11-03 PROCEDURE — 250N000011 HC RX IP 250 OP 636: Performed by: INTERNAL MEDICINE

## 2022-11-03 PROCEDURE — 80202 ASSAY OF VANCOMYCIN: CPT | Performed by: INTERNAL MEDICINE

## 2022-11-03 RX ORDER — WARFARIN SODIUM 5 MG/1
5 TABLET ORAL DAILY
Qty: 90 TABLET | Refills: 0 | Status: SHIPPED | OUTPATIENT
Start: 2022-11-03 | End: 2022-12-14

## 2022-11-03 RX ORDER — WARFARIN SODIUM 5 MG/1
5 TABLET ORAL
Status: DISCONTINUED | OUTPATIENT
Start: 2022-11-03 | End: 2022-11-03 | Stop reason: HOSPADM

## 2022-11-03 RX ORDER — BENZOCAINE/MENTHOL 6 MG-10 MG
LOZENGE MUCOUS MEMBRANE 2 TIMES DAILY
Qty: 30 G | Refills: 0 | Status: SHIPPED | OUTPATIENT
Start: 2022-11-03 | End: 2022-12-03

## 2022-11-03 RX ORDER — LOPERAMIDE HCL 2 MG
2 CAPSULE ORAL 4 TIMES DAILY PRN
Qty: 200 CAPSULE | Refills: 0 | Status: SHIPPED | OUTPATIENT
Start: 2022-11-03 | End: 2022-11-03

## 2022-11-03 RX ADMIN — Medication 1500 MG: at 09:34

## 2022-11-03 RX ADMIN — LISINOPRIL 10 MG: 10 TABLET ORAL at 07:52

## 2022-11-03 RX ADMIN — Medication 5 ML: at 07:53

## 2022-11-03 RX ADMIN — HYDROXYCHLOROQUINE SULFATE 200 MG: 200 TABLET, FILM COATED ORAL at 07:52

## 2022-11-03 RX ADMIN — DIGOXIN 125 MCG: 125 TABLET ORAL at 07:52

## 2022-11-03 RX ADMIN — HYDRALAZINE HYDROCHLORIDE 25 MG: 25 TABLET, FILM COATED ORAL at 07:52

## 2022-11-03 RX ADMIN — FAMOTIDINE 20 MG: 20 TABLET ORAL at 07:52

## 2022-11-03 RX ADMIN — HYDROXYZINE HYDROCHLORIDE 50 MG: 50 TABLET ORAL at 02:27

## 2022-11-03 RX ADMIN — HYDROCORTISONE: 1 CREAM TOPICAL at 07:53

## 2022-11-03 RX ADMIN — TAMSULOSIN HYDROCHLORIDE 0.4 MG: 0.4 CAPSULE ORAL at 07:51

## 2022-11-03 RX ADMIN — ACETAMINOPHEN 650 MG: 325 TABLET, FILM COATED ORAL at 08:30

## 2022-11-03 ASSESSMENT — ACTIVITIES OF DAILY LIVING (ADL)
ADLS_ACUITY_SCORE: 33
ADLS_ACUITY_SCORE: 33
ADLS_ACUITY_SCORE: 31
ADLS_ACUITY_SCORE: 33
ADLS_ACUITY_SCORE: 33
ADLS_ACUITY_SCORE: 31
ADLS_ACUITY_SCORE: 33

## 2022-11-03 NOTE — PROGRESS NOTES
Continue to monitor blood pressure  Pain management to prevent pain triggered elevated blood pressure  Low sodium diet D-HM 3 LVAD pt admitted on 10/27/22 with N/V and drive line drainage,  with concern for infection and recent c-diff infection. New issue today, BRBPR. External hemorrhoid noted. Am hemoglobin 8.6.  I-Continues on IV vancomycin. New order for hydrocortisone cream to hemorrhoid.  A-Recheck hemoglobin 9.4.  P-Anticipate discharge to [facility] tomorrow. Continue with current poc. Notify Cards 2 provider of any concerns/changes.

## 2022-11-03 NOTE — DISCHARGE SUMMARY
DISCHARGE   Discharged to: Home  Via: Automobile  Accompanied by: Family  Discharge Instructions: diet, activity, medications, follow up appointments, when to call the MD, and what to watchout for (i.e. s/s of infection, increasing SOB, palpitations, chest pain,)  Prescriptions: To be filled by [facility] pharmacy per pt's request; medication list reviewed & sent with pt  Follow Up Appointments: arranged; information given  Belongings: All sent with pt  IV: out  Telemetry: off  Pt exhibits understanding of above discharge instructions; all questions answered.  Discharge Paperwork: faxed

## 2022-11-03 NOTE — TELEPHONE ENCOUNTER
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    Discharge disposition: Staying locally at [facility] until 22; then going to stay at his son's house in [place].  [facility] is going to see Dandy 22--writer spoke with Sandra at [facility] to see if they would draw an INR on 22.  Order faxed to:  [phone].  Dandy will be going to [place], [place] on 22 and will have his blood drawn at [facility] on [address]  PH: [phone];  And fax:  [phone]. Standing INR order faxed to clinic.      He will have [facility] in [place] also.  [facility] did not accept his as a patient.  See Epic note from  dated 11/3/22.    Results:    Recent labs: (last 7 days)     10/27/22  2212 10/28/22  0533 10/29/22  0615 10/30/22  0653 10/31/22  0641 22  0754 22  0611 22  0522   INR 2.38* 2.30* 2.74* 3.14* 2.66* 2.50* 2.38* 2.30*     Anticoagulation inpatient management:     see dosing calendar for doses taken while inpatient    Anticoagulation discharge instructions:     Warfarin dosin mg on 11/3/22; recheck INR 22   Bridging: No   INR goal change: No      Medication changes affecting anticoagulation: Yes:Concurrent use of VANCOMYCIN and WARFARIN may result in an increased risk of bleeding. Start lasix, hydrocortisone.   Stop doxycycline.  Will be getting vancomycin IV    Additional factors affecting anticoagulation: Yes: recovering from hospital stay, has had recent Covid, diarrhea, and Cdiff (earlier in Oct)     PLAN     No adjustment to anticoagulation plan needed  Recommend to check INR on 22    Spoke with Dandy and [facility] nurseSandra    Anticoagulation Calendar updated    Leatha Abbott RN

## 2022-11-03 NOTE — PROGRESS NOTES
Verbal handoff provided to Dr. aGrza, an infectious diseases provider, at [facility]. He will call me with questions.

## 2022-11-03 NOTE — DISCHARGE INSTRUCTIONS
"[facility] provides you IV Vancomycin for home--you DO NOT need to pick this up from \"any pharmacy\"--I could not remove this  _____________________________________________________________________________________________________      Keep Video Visit 11/4/22 @ 0845 with Loretta Romero from [facility]--they send you the info under you my chart appointments and a call facilitator will call you 15-30 minutes prior to this appointment  _________________________________________________________________________________________________________      [facility]  [address]   [place] [address]  Ph: [phone]  Fax: [phone]  Pt scheduled on Weds, 11/9 at 9:45am with Dr. Goode.   ________________________________________________________________________________________________________       [facility]  [address] [place], [place]  Phone: [phone]  Fax: [phone]  Pt scheduled on Monday, 11/7 at 1pm with Dr. Austin Sierra for establishment of care and post hospitalization follow up.  "

## 2022-11-03 NOTE — PHARMACY-VANCOMYCIN DOSING SERVICE
Pharmacy Vancomycin Note  Date of Service November 3, 2022  Patient's  1960   62 year old, male    Indication: LVAD driveline infection  Day of Therapy: 7  Current vancomycin regimen: 1500 mg IV q24h  Current vancomycin monitoring method: AUC  Current vancomycin therapeutic monitoring goal: 400-600 mg*h/L    InsightRX Prediction of Current Vancomycin Regimen  Dosing regimen: 1500 mg (24 mg/kg) IV Q24 hr  AUC24, ss: 492 mg/L.hr  Ctrough, ss: 12.9 mg/L  Pauc: 95 % (probability that AUC is >400 - efficacy)  Pconc: 0 % (probability that Ctrough is above 20 mcg/mL - toxicity)  Toxicity: 8 % (probability of nephrotoxicity)    Current estimated CrCl = Estimated Creatinine Clearance: 98.1 mL/min (based on SCr of 0.7 mg/dL).    Creatinine for last 3 days  2022:  7:54 AM Creatinine 0.68 mg/dL  2022:  6:11 AM Creatinine 0.57 mg/dL  11/3/2022:  5:22 AM Creatinine 0.70 mg/dL    Recent Vancomycin Levels (past 3 days)  2022:  7:54 AM Vancomycin 15.1 ug/mL  11/3/2022:  5:22 AM Vancomycin 14.4 ug/mL    Vancomycin IV Administrations (past 72 hours)                   vancomycin (VANCOCIN) 1,500 mg in 0.9% NaCl 250 mL intermittent infusion (mg) 1,500 mg New Bag 22 0809     1,500 mg New Bag 22 1046    vancomycin (VANCOCIN) 1,250 mg in 0.9% NaCl 250 mL intermittent infusion (mg) 1,250 mg New Bag 10/31/22 1136                Nephrotoxins and other renal medications (From now, onward)    Start     Dose/Rate Route Frequency Ordered Stop    22 0800  lisinopril (ZESTRIL) tablet 10 mg         10 mg Oral DAILY 22 1426      22 0000  vancomycin (VANCOCIN) 1500 mg/250 mL IVPB         1,500 mg Intravenous EVERY 24 HOURS 22 1122 22 2359    22 0000  furosemide (LASIX) 20 MG tablet         20 mg Oral DAILY PRN 22 1122      22 1030  vancomycin (VANCOCIN) 1,500 mg in 0.9% NaCl 250 mL intermittent infusion         1,500 mg  over 90 Minutes Intravenous EVERY 24 HOURS  11/01/22 0920               Contrast Orders - past 72 hours (72h ago, onward)    None          Interpretation of levels and current regimen:  Vancomycin level is reflective of -600    Has serum creatinine changed greater than 50% in last 72 hours: No    Urine output:  good urine output    Renal Function: Stable    Plan:  1. Continue Current Dose  2. Vancomycin monitoring method: AUC  3. Vancomycin therapeutic monitoring goal: 400-600 mg*h/L  4. Pharmacy will check vancomycin levels as appropriate in 3-5 Days.  5. Serum creatinine levels will be ordered daily for the first week of therapy and at least twice weekly for subsequent weeks.    Susan Esteban, SaravananD, BCPS

## 2022-11-03 NOTE — PLAN OF CARE
Dx: admitted 10/27 for N/V and driveline infection    Neuro: A&O x4. C/o of difficulty falling asleep  Cardiac: SR 1 AVB. Map elevated. PI up to 8. PT denied symptoms.  Respiratory:  LS dim at bases. On RA.  GI/: No BM this shift. Voiding.  Diet: 2g Na+; 2L FR  Skin: Hydrocortisone cream applied on Hemorrhoid   Pain: denied  LDAs: R SL PICC   Labs: Hgb 8.6.  Mobility: SBA    Plan: Anticipated discharge to [place] today. Vanco to be completed prior to discharge.

## 2022-11-03 NOTE — PROGRESS NOTES
Care Management Discharge Note    Discharge Date: 11/03/2022 to [facility] locally until Sunday, 11/6/22 then to:  Amos Villasenor's home address:  [address] [place] [address]       Discharge Disposition: Home Infusion, Home Care  Follow Up ([facility])    Appointments:     [facility] RN will visit you approx 10am on Friday, 11/4/22 at [facility] for IV antibiotic administration   _______________________________________________________________________________________________________________     Keep established appointment with [facility] on Friday, 11/4/22 @ 0845 with Loretta Romero NP--this is a video visit--instructions in my chart and a call facilitator will call you 15-3o minutes prior to your visit   __________________________________________________________________________________________________________________       [facility]   [address]    [place], [place]   Phone: [phone]   Fax: [phone]   Pt scheduled on Monday, 11/7 at 1pm with Dr. Austin Sierra for establishment of care and post hospitalization follow up.   _________________________________________________________________________________________________________________________           Keep established appointment with ID on :   [facility] [place]   [address]   [place] [address]   Ph: [phone]  Fax: [phone]  Pt scheduled on Weds, 11/9 at 9:45am with Dr. Goode.   Dr. Baca updated and will call to do sign out.      Appointments on [place] and/or [place] (with [facility] or [facility] provider or service). Call [phone] if you haven't heard regarding these appointments within 7 days of discharge.   _________________________________________________________________________________________________________________________     Keep established appointment with Dr. Lora: 1/12/23--see AVS appointments                Discharge Services:     Discharge DME:  none    Discharge Transportation: family or friend will provide    Private pay costs discussed: Not applicable    PAS Confirmation Code:    Patient/family educated on Medicare website which has current facility and service quality ratings:  n/a    Education Provided on the Discharge Plan: to Dr Rebecca Harper of cards 2, bedside RN Onofre [id number], and ARMANDO David   Persons Notified of Discharge Plans: to Dr Rebecca Harper of cards 2, bedside RN Onofre [id number], and PATRICE David  Patient/Family in Agreement with the Plan: yes    Handoff Referral Completed: Yes   I have fax'd AVS/sumamry to [facility]. [facility] will inform [facility] and fax orders.    Additional Information:  Discharge orders signed alittle after 11am--ARMANDO Yousif aware.  I verified with Dr Rebecca Harper, that pt needs to keep 11/4/22 0845 video visit with [facility] ID.    Found out @ 12:58pm from [facility] ponce david that [facility] had NOT accepted pt--[facility] tried 4 other agencies that cannot take him.  2:37pm Nica informed me that [facility] has RN's in [place] and they have agreed to see him any day but Wednesday.  I have asked for visit to begin Tuesday, 11/8/22 as my 6C pharmacist Daly said he should have labs on that day and PICC dressing changes--he is seeing [facility] on 11/9/22.  The AVS will NOT let me update the orders (take out [facility] and update [facility] to reflect their RN visits every Tuesdayas he has been discharged. Nica aware and asking if they need the MD to call with verbal order.  Nica will call pt's son: Amos with update.        For future reference in case ynes need this info: I found out that PCP clinic RN does NOT do PICC dressing changes and pt would have to go to [facility]: OPP located in the main [facility] at [address] [facility]  Ph: [phone] Fax; [phone]  AND a [facility] would have to  place the orders            Leann Falcon RN

## 2022-11-04 ENCOUNTER — TELEPHONE (OUTPATIENT)
Dept: INFECTIOUS DISEASES | Facility: CLINIC | Age: 62
End: 2022-11-04

## 2022-11-04 ENCOUNTER — VIRTUAL VISIT (OUTPATIENT)
Dept: INFECTIOUS DISEASES | Facility: CLINIC | Age: 62
End: 2022-11-04
Attending: REGISTERED NURSE
Payer: COMMERCIAL

## 2022-11-04 ENCOUNTER — TELEPHONE (OUTPATIENT)
Dept: PHARMACY | Facility: OTHER | Age: 62
End: 2022-11-04

## 2022-11-04 ENCOUNTER — CARE COORDINATION (OUTPATIENT)
Dept: CARDIOLOGY | Facility: CLINIC | Age: 62
End: 2022-11-04

## 2022-11-04 ENCOUNTER — PATIENT OUTREACH (OUTPATIENT)
Dept: CARE COORDINATION | Facility: CLINIC | Age: 62
End: 2022-11-04

## 2022-11-04 ENCOUNTER — TELEPHONE (OUTPATIENT)
Dept: ANTICOAGULATION | Facility: CLINIC | Age: 62
End: 2022-11-04

## 2022-11-04 ENCOUNTER — LAB REQUISITION (OUTPATIENT)
Dept: LAB | Facility: CLINIC | Age: 62
End: 2022-11-04
Payer: COMMERCIAL

## 2022-11-04 ENCOUNTER — ANTICOAGULATION THERAPY VISIT (OUTPATIENT)
Dept: ANTICOAGULATION | Facility: CLINIC | Age: 62
End: 2022-11-04

## 2022-11-04 DIAGNOSIS — Z95.811 LVAD (LEFT VENTRICULAR ASSIST DEVICE) PRESENT (H): Primary | ICD-10-CM

## 2022-11-04 DIAGNOSIS — I50.20 HEART FAILURE WITH REDUCED EJECTION FRACTION, NYHA CLASS III (H): ICD-10-CM

## 2022-11-04 DIAGNOSIS — Z95.811 LEFT VENTRICULAR ASSIST DEVICE PRESENT (H): ICD-10-CM

## 2022-11-04 DIAGNOSIS — T82.7XXA INFECTION ASSOCIATED WITH DRIVELINE OF LEFT VENTRICULAR ASSIST DEVICE (LVAD) (H): ICD-10-CM

## 2022-11-04 DIAGNOSIS — Z95.811 LVAD (LEFT VENTRICULAR ASSIST DEVICE) PRESENT (H): ICD-10-CM

## 2022-11-04 DIAGNOSIS — Z79.2 ENCOUNTER FOR LONG-TERM (CURRENT) USE OF ANTIBIOTICS: ICD-10-CM

## 2022-11-04 DIAGNOSIS — I50.22 CHRONIC SYSTOLIC CONGESTIVE HEART FAILURE (H): Primary | ICD-10-CM

## 2022-11-04 DIAGNOSIS — R78.81 BACTEREMIA: ICD-10-CM

## 2022-11-04 DIAGNOSIS — Z79.2 RECEIVING INTRAVENOUS ANTIBIOTIC TREATMENT AS OUTPATIENT: ICD-10-CM

## 2022-11-04 LAB
BACTERIA WND CULT: ABNORMAL
INR PPP: 2.15 (ref 0.85–1.15)

## 2022-11-04 PROCEDURE — 99417 PROLNG OP E/M EACH 15 MIN: CPT | Performed by: REGISTERED NURSE

## 2022-11-04 PROCEDURE — 85610 PROTHROMBIN TIME: CPT | Performed by: INTERNAL MEDICINE

## 2022-11-04 PROCEDURE — 99215 OFFICE O/P EST HI 40 MIN: CPT | Mod: GT | Performed by: REGISTERED NURSE

## 2022-11-04 NOTE — PROGRESS NOTES
Called patient/caregiver to check in 24 hour post discharge. Pt reports VAD parameters normal and weight 137 lbs. Reviewed medications and answered any questions. Patient reports getting settled in well after being discharged and is well prepared for administering his IV antibiotics.     Discussed specific new problems/stressors since being discharged from the hospital: Patient is currently packing and very excited to get home. He has follow up appointments set up in [place]. Empathized with patient and reviewed coping strategies: enlisting support from friends and love ones, attending patient and caregiver support groups, reviewing LVAD educational materials to reinforce knowledge, and talking about concerns with family/care providers/trusted others. Encouraged pt to page VAD Coordinator with any issues or questions. Pt verbalizes understanding.

## 2022-11-04 NOTE — LETTER
11/4/2022       RE: Dandy Sands  [address]  [address] [address]     Dear Colleague,    Thank you for referring your patient, Dandy Sands, to the [facility] INFECTIOUS DISEASE CLINIC [place] at [facility]. Please see a copy of my visit note below.    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    Care coordination provided through our OPAT RN, Jonathan Lucas, who is in communication with [facility] ([facility])    IV vancomycin via PICC line to treat Corynebacterium striatum per wound culture results; tentative end date 11/11-11/15 pending clinical improvement; has enough doses to make it through 11/11/22    Weekly labs while on IV vancomycin: CBC with diff, CMP, and vancomycin level; Vancomycin levels and labs will be monitored by [facility] and the draws will be done by [place] infusion trained nurse who works for [employer]     Follow up with Dr. Goode in [place], [place] on 11/9/22    [place] nurse who is trained in infusions and works for [facility] will be coming out twice a week to manage the PICC line, help with infusions, and draw labs. She has already established with the patient and family    Driveline changes are performed by the daughter in-law    Will follow up on Minocycline susceptibility and pass this information on to Dr. Rodríguez in the case oral suppressive therapy is needed for this infection    2. Anterior mediastinum fluid collection (decreasing since 8/20/22), possible loculated hematoma    3. Fever with rigors    Resolved    2 sets of blood cultures  "negative x48 hours    4. Diarrhea acute on chronic    C diff in September, 2022; treated PO vancomycin.     Recurrence of diarrhea after PO doxycyline; C diff positive but toxin negative (showing prior infection vs colonization 10/29/22) and no need to treat.    Possible medication-related diarrhea    Likely not infectious at this point; will defer further workup to patient's PCP    5. HFrEF due to ICM, s/p LVAD implantation (7/8/22)    6. UC positive for Proteus mirabilis     no UTI symptoms at the time of culture positivity; not clinically significant    I spent 90 minutes as part of a visit on the date of the encounter doing chart review, history and exam, documentation.      YAO Trevino, CNP  Infectious Diseases  Pager# [phone]          Interval History:       Patient heading home to [place] where he plans to finish his OPAT care and establish with infectious disease. Warm handoff already given by Dr. Baca to ID provider, Dr. Goode, with whom the patient will establish November, 9th. [facility] helping to coordinate the transition of OPAT to a pharmacy in [place] that will provide the remainder of the patient's IV vancomycin for OPAT and monitor the patient's vancomycin levels throughout therapy. Tentative end date for IV vancomycin 11/11-11/15 based on clinical improvement and to be determined by Dr. Rodríguez in [place]. Has enough vancomycin to make it to his first appointment with Dr. Rodríguez. Follows with Rheumatology in [place] and CellCept currently on hold with no current Lupus flare. Also has plans to establish with a PCP, first appointment on 11/4/22. Still having ongoing diarrhea. Denies any current UTI symptoms.        History of Present Illness:     Per Verna Millan PA-C's note 10/28/22:  \"Dandy Sands is a 62 year old male with history of SLE on hydroxychloroquine, antiphospholipid ab syndrome on warfarin, CAD, HFrEF due to ICM, s/p LVAD implantation (7/8/22), recent " "COVID-19 infection (10/13/22), C.diff (9/7/22), chronic medication-related diarrhea who presented to ED on 10/27/22 with nausea, vomiting, and purulent drainage from LVAD driveline. Drainge noted ~10/25, culture was collected 10/26 patient was started on doxycycline. Presented to ED 10/27 after developing fever, now with elevated CRP and WBC. CT (10/27) with \"trace encapsulated fluid about the driveline\" and mediastinal fluid collection that is slowly decreasing in size since 8/20/22 and favored to represent loculated hematoma. Fever with rigors concerning for possible bacteremia, blood cultures are pending. Has been started on vancomycin and zosyn- agree with continuing for now.      Cough- for several days, occasional green sputum production. CXR and CT Chest not c/w pneumonia. Recent hospitalization for COVID-19 (10/13/22).     Diarrhea- chronic diarrhea at baseline, thought to be medication related. Had C.diff in September 2022, treated with PO vancomycin. Admitted at end of course as diarrhea had not resolved, suspicion for infectious diarrhea low at that time. Monitor.\"      OPAT Plan for driveline infection:  Name of Antibiotic Dose of Antibiotic1 Pharmacy to assist with dosing Y/N Anticipated duration Effective start date2 End date   Vancomycin  Pharmacy to dose Y 2-4 weeks 10/28/22 11/11 or 11/25 pending clinical improvement                                     Review of Systems: Remaining systems all reviewed and negative.           Medications & Allergies:     Current Outpatient Medications   Medication     acetaminophen (TYLENOL) 325 MG tablet     atorvastatin (LIPITOR) 40 MG tablet     digoxin (LANOXIN) 125 MCG tablet     famotidine (PEPCID) 20 MG tablet     furosemide (LASIX) 20 MG tablet     hydrALAZINE (APRESOLINE) 25 MG tablet     hydrocortisone (CORTAID) 1 % external cream     hydrocortisone 1 % CREA cream     hydroxychloroquine (PLAQUENIL) 200 MG tablet     lisinopril (ZESTRIL) 10 MG tablet     " loperamide (IMODIUM) 2 MG capsule     melatonin 5 MG tablet     methylcellulose (CITRUCEL) 500 MG TABS tablet     multivitamin w/minerals (THERA-VIT-M) tablet     pramox-pe-glycerin-petrolatum (PREPARATION H) 1-0.25-14.4-15 % CREA cream     saline nasal (AYR SALINE) GEL topical gel     tamsulosin (FLOMAX) 0.4 MG capsule     vancomycin (VANCOCIN) 1500 mg/250 mL IVPB     warfarin ANTICOAGULANT (COUMADIN) 5 MG tablet     No current facility-administered medications for this visit.         No Known Allergies         Physical Exam:        There were no vitals filed for this visit. No vitals, this was a video visit    Physical Examination:  GENERAL: Healthy, alert and no distress  EYES: Eyes grossly normal to inspection.  No discharge or erythema, or obvious scleral/conjunctival abnormalities.  RESP: No audible wheeze, cough, or visible cyanosis.  No visible retractions or increased work of breathing.    SKIN: Visible skin clear. No significant rash, abnormal pigmentation or lesions.  NEURO: Cranial nerves grossly intact.  Mentation and speech appropriate for age.  PSYCH: Mentation appears normal, affect normal/bright, judgement and insight intact, normal speech and appearance well-groomed.    Microbiology Data   Pertinent Micro:    Culture   Date Value Ref Range Status   10/28/2022 2+ Corynebacterium striatum (A)  Incomplete     Comment:     Identification obtained by MALDI-TOF mass spectrometry research use only database. Test characteristics determined and verified by the Infectious Diseases Diagnostic Laboratory.Susceptibilities not routinely done   10/28/2022 10,000-50,000 CFU/mL Proteus mirabilis (A)  Final   10/27/2022 No Growth  Final   10/27/2022 No Growth  Final   10/26/2022 No Growth  Final   10/26/2022 No Growth  Final   10/26/2022 No anaerobic organisms isolated  Final   10/26/2022 1+ Corynebacterium striatum (A)  Final     Comment:     Identification obtained by MALDI-TOF mass spectrometry research use only  database. Test characteristics determined and verified by the Infectious Diseases Diagnostic Laboratory.Susceptibilities not routinely done   08/04/2022 No Growth  Final   08/04/2022 No Growth  Final   08/03/2022 No Growth  Final   08/03/2022 No Growth  Final   08/03/2022 No Growth  Final   08/02/2022 No Growth  Final   08/02/2022 Positive on the 1st day of incubation (A)  Final   08/02/2022 Staphylococcus epidermidis (AA)  Final     Comment:     1 of 2 bottles   08/02/2022 Positive on the 1st day of incubation (A)  Final   08/02/2022 Enterococcus faecalis (AA)  Final     Comment:     1 of 2 bottles   08/01/2022 No Growth  Final   07/31/2022 No Growth  Final       Last check of C difficile  C Difficile Toxin B by PCR   Date Value Ref Range Status   10/29/2022 Positive (A) Negative Final     Comment:     Detection of C. difficile nucleic acid in stools confirms the presence of these organisms in diarrheal patients but may not indicate that C. difficile is the etiologic agent of the diarrhea. Results from the Xpert C. difficile assay should be interpreted in conjunction with other laboratory and clinical data available to the clinician.    Patients with a positive C. difficile PCR result will receive reflex GDH/Toxin Immunoassay testing. Please interpret the PCR test result in conjunction with GDH/Toxin Immunoassay results and the clinical status of patient.       Urine Studies     Recent Labs   Lab Test 10/28/22  0926 09/19/22  0023 09/10/22  1222 08/31/22  1542 08/03/22  1110 07/21/22  2239   URINEPH 6.0 5.5 5.5 5.5 7.0 5.5   NITRITE Negative Negative Negative Negative Negative Negative   LEUKEST Negative Negative Negative Negative Negative Negative   WBCU 1 <1  --  4 21* 1       CSF testing   No lab results found.    Invalid input(s): CADAM, EVPCR, ENTPCR, ENTEROVIRUS    Laboratory Data:   Metabolic Studies       Recent Labs   Lab Test 11/03/22  0522 11/02/22  0611 10/29/22  0615 10/28/22  1957 10/28/22  0533  10/28/22  0045 10/27/22  2212 09/05/22  0617 09/04/22  2234 06/19/22  2157 06/19/22  1522 05/20/22  1715 05/20/22  0516    140   < >  --    < >  --  134*   < > 137   < > 138   < > 138   POTASSIUM 3.8 3.9   < >  --    < >  --  4.2   < > 4.2   < > 4.0   < > 4.2   CHLORIDE 108* 109*   < >  --    < >  --  101   < > 106   < > 107   < > 105   CO2 22 20*   < >  --    < >  --  20*   < > 16*   < > 26   < > 24   ANIONGAP 9 11   < >  --    < >  --  13   < > 15   < > 5   < > 9   BUN 14.3 11.0   < >  --    < >  --  13.9   < > 17.6   < > 10   < > 16   CR 0.70 0.57*   < >  --    < >  --  0.72   < > 0.59*   < > 0.78   < > 0.92   GFRESTIMATED >90 >90   < >  --    < >  --  >90   < > >90   < > >90   < > >90   GLC 96 98   < >  --    < >  --  100*   < > 112*   < > 85   < > 87   A1C  --   --   --   --   --   --   --   --   --   --   --   --  5.5   ELDA 8.6* 8.8   < >  --    < >  --  8.9   < > 9.5   < > 8.2*   < > 8.4*   PHOS  --   --   --   --   --   --   --   --  3.8   < >  --    < >  --    MAG  --  1.7   < >  --    < >  --   --    < > 1.6*   < >  --    < > 2.1   LACT  --   --   --  1.6  --   --  1.3   < >  --    < > 1.1   < >  --    PCAL  --   --   --   --   --  0.07*  --   --   --   --   --   --   --    CKT  --   --   --   --   --   --   --   --   --   --  37  --   --     < > = values in this interval not displayed.       Hepatic Studies    Recent Labs   Lab Test 11/03/22  0522 11/02/22  0611 11/01/22  0754 10/28/22  0852 10/28/22  0533 10/27/22  2212 10/16/22  0640 10/14/22  1742   BILITOTAL 0.3 0.3 0.3   < >  --  0.4   < > 0.5   DBIL  --   --   --   --   --   --   --  0.20   ALKPHOS 131* 117 118   < >  --  124   < > 82   PROTTOTAL 6.8 6.5 6.8   < >  --  7.5   < > 6.4   ALBUMIN 3.3* 2.9* 3.1*   < >  --  3.6   < > 3.5   AST 20 20 18   < >  --  21   < > 21   ALT 12 9* 8*   < >  --  11   < > 16   LDH  --   --   --   --  308* 260*   < > 203    < > = values in this interval not displayed.       Hematology Studies      Recent Labs    Lab Test 11/03/22  0522 11/02/22  1528 11/02/22  0611 11/01/22  0755 10/31/22  0641 10/30/22  0653 07/20/22  1523 07/20/22  0949 07/20/22  0355   WBC 7.6 8.5 6.5 6.3 5.9 7.1   < > 15.9* 13.9*   ANEU  --   --   --   --   --   --   --  14.6* 11.8*   ALYM  --   --   --   --   --   --   --  0.2* 1.0   GERALDO  --   --   --   --   --   --   --  0.5 0.4   AEOS  --   --   --   --   --   --   --  0.5 0.1   HGB 8.6* 9.4* 8.6* 8.8* 8.6* 8.4*   < > 8.5* 8.1*   HCT 27.5* 29.2* 28.3* 28.2* 27.1* 26.7*   < > 27.0* 25.4*    289 262 251 223 203   < > 150 157    < > = values in this interval not displayed.       Medication levels    Recent Labs   Lab Test 11/03/22  0522   VANCOMYCIN 14.4       Inflammatory Markers    Recent Labs   Lab Test 10/28/22  0533 10/27/22  2212 10/26/22  1604 10/18/22  0606 10/17/22  0534 10/16/22  0640 08/03/22  0606 06/19/22  1522   SED  --   --   --   --   --   --   --  39*   .00* 86.70* 83.70* 29.70* 50.30* 70.80*   < > 28.0*    < > = values in this interval not displayed.       Body fluid stats  No lab results found.    Imaging:  Results for orders placed or performed during the hospital encounter of 10/27/22   XR Chest 2 Views    Narrative    EXAM: XR CHEST 2 VIEWS  LOCATION: [facility]  DATE/TIME: 10/27/2022 10:50 PM    INDICATION: Fever; LVAD  COMPARISON: 10/13/2022.      Impression    IMPRESSION: Lungs are clear. Chronic blunting of the right costophrenic angle. No acute pleural effusion. No pneumothorax.    Unchanged cardiomegaly. Postsurgical changes of median sternotomy. Left chest wall pacer/defibrillator device is present. A left ventricular device is additionally noted, which appears in unchanged position. Surgical clips overlie the right axilla. An   additional metallic density is partially imaged at the mid abdomen, incompletely evaluated.   XR Chest Port 1 View    Narrative    XR CHEST PORT 1 VIEW  11/1/2022 10:06 AM      HISTORY:  62-year-old male status post PICC placement.     COMPARISON: 10/27/2022    FINDINGS:   Portable 45 degree AP projection radiograph of the chest. Coronary  stent. Right-sided PICC tip projects over the superior cavoatrial  junction. Left chest wall pacer/defibrillator leads in stable  position. LVAD unchanged position. Sternotomy wires intact. Trachea is  midline. Cardiac silhouette is stable. Unchanged blunting of the right  costophrenic angle. No appreciable pneumothorax. No focal airspace  opacity.        Impression    IMPRESSION:   1.  PICC tip projects over the superior cavoatrial junction.  2.  No pneumothorax or focal air space opacities.    I have personally reviewed the examination and initial interpretation  and I agree with the findings.    GABRIEL COURTNEY MD         SYSTEM ID:  [id number]         Dandy is a 62 year old who is being evaluated via a billable video visit.      Patient denies any changes since echeck-in regarding medication and allergies and states all information entered during echeck-in remains accurate.    How would you like to obtain your AVS? MyChart  If the video visit is dropped, the invitation should be resent by: Text to cell phone: [phone]  Will anyone else be joining your video visit? No        Video-Visit Details    Video Start Time: 9:00    Type of service:  Video Visit    Video End Time:9:37    Originating Location (pt. Location): Home        Distant Location (provider location):  Off-site    Platform used for Video Visit: [employer]

## 2022-11-04 NOTE — PROGRESS NOTES
Dandy is a 62 year old who is being evaluated via a billable video visit.      Patient denies any changes since echeck-in regarding medication and allergies and states all information entered during echeck-in remains accurate.    How would you like to obtain your AVS? MyChart  If the video visit is dropped, the invitation should be resent by: Text to cell phone: [phone]  Will anyone else be joining your video visit? No        Video-Visit Details    Video Start Time: 9:00    Type of service:  Video Visit    Video End Time:9:37    Originating Location (pt. Location): Home        Distant Location (provider location):  Off-site    Platform used for Video Visit: SinglePlatform

## 2022-11-04 NOTE — TELEPHONE ENCOUNTER
[facility] is licensed in [place] and able to deliver Pt IV meds,  monitor labs and Vanco dosing. However they do not have a nursing group there to draw labs and do PICC line cares so will need to find an agency there that can do this.   [facility] will be in contact with writer regarding nursing agency options.     Pt already has enough IV meds to last him through 11/11 and Vanco trough was check yesterday.      [facility] notified writer that Pt is going to stay with [facility] nursing since there is a nurse who lives close enough to Pt and has agreed to see the Pt.   However, if there are any urgent labs that have to be repeated the Pt may have to go to lab near his home if the nurse is not available.

## 2022-11-04 NOTE — TELEPHONE ENCOUNTER
Nafisa from [facility] called reporting that they need a new INR lab standing order with clinic info on it (address in particular) and to fax this to Fax [phone]. Writer faxed a new standing INR lab order to [place] with Coumadin clinic address, phone, and fax number.

## 2022-11-04 NOTE — PROGRESS NOTES
ANTICOAGULATION MANAGEMENT     Dandy Sands 62 year old male is on warfarin with therapeutic INR result. (Goal INR 2.0-3.0)    Recent labs: (last 7 days)     11/04/22  1050   INR 2.15*       ASSESSMENT       Source(s): Chart Review and Patient/Caregiver Call       Warfarin doses taken: Warfarin taken as instructed    Diet: Patient is going home to [place].  Please ask about Boost/Ensure drinks when he is home.    New illness, injury, or hospitalization: Yes: Discharged on 11/3 to [place] for DLES infection    Medication/supplement changes: Vancomycin Will be IV at home 11/11 to 11/15, was on Oral for C.diff, off oral 11/3/22. subsequent INRs may increased. Closer INR monitoring recommended.    Doxycycline started on 11/3/22 not expected to affect INR, but may increase risk of bleeding    Signs or symptoms of bleeding or clotting: No    Previous INR: Therapeutic last 2(+) visits    Additional findings: Patient is discharging to [place].  Colleagues faxed orders to lab on 11/4/22.       PLAN     Recommended plan for ongoing change(s) affecting INR     Dosing Instructions: change your warfarin dose to 7.5mg every Tues, Fri; 5mg all other days with next INR in 3 days       Summary  As of 11/4/2022    Full warfarin instructions:  7.5 mg every Tue, Fri; 5 mg all other days; Starting 11/4/2022   Next INR check:  11/7/2022             Telephone call with Dandy who verbalizes understanding and agrees to plan and who agrees to plan and repeated back plan correctly    Patient using outside facility for labs    Education provided:     Taking warfarin: purpose of warfarin and how it works, importance of following ACC instructions vs instructions on the prescription bottle and Importance of taking warfarin as instructed    Symptom monitoring: monitoring for bleeding signs and symptoms, monitoring for clotting signs and symptoms and when to seek medical attention/emergency care    Importance of notifying anticoagulation  clinic for: changes in medications; a sooner lab recheck maybe needed, diarrhea, nausea/vomiting, reduced intake, cold/flu, and/or infections; a sooner lab recheck maybe needed and if you did not receive dosing instructions on the same day as your labs were checked    Plan made with [facility] Pharmacist Pepper Sneed and per LVAD protocol    Chon Tinsley, RN  Anticoagulation Clinic  11/4/2022    _______________________________________________________________________     Anticoagulation Episode Summary     Current INR goal:  2.0-3.0   TTR:  86.0 % (3.3 wk)   Target end date:  Indefinite   Send INR reminders to:  ANTICOAG LVAD    Indications    Chronic systolic congestive heart failure (H) [I50.22]  LVAD (left ventricular assist device) present (H) [Z95.811]  Heart failure with reduced ejection fraction  NYHA class III (H) [I50.20]  Left ventricular assist device present (H) [Z95.811]           Comments:  Follow VAD Anticoag protocol:Yes: HeartMate 3   Bridging: No bridging epistaxis.   Date VAD placed: 7/8/22         Anticoagulation Care Providers     Provider Role Specialty Phone number    Kelly Lora MD Referring Cardiovascular Disease [phone]    Behzad Zeng MD Referring Student in [facility] health care education/training program [phone]

## 2022-11-04 NOTE — PROGRESS NOTES
Post Discharge: Care Coordinator  D/I: [facility] and will go to son's home in [place] this weekend.  P: I fax'd discharge orders and summary with ID notes to [place] [facility] and [place] [facility].

## 2022-11-04 NOTE — PROGRESS NOTES
[facility]  Infectious Disease Outpatient Follow up Note: Video Visit     Patient:  Dandy Sands, Date of birth 1960  Medical record number [id number]  Date of Visit:  11/04/2022    Video Start: 9:00 AM  Video Stop: 9:37 AM  Via [employer]        Assessment and Recommendations   1. 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 ([facility])    IV vancomycin via PICC line to treat Corynebacterium striatum per wound culture results; tentative end date 11/11-11/15 pending clinical improvement; has enough doses to make it through 11/11/22    Weekly labs while on IV vancomycin: CBC with diff, CMP, and vancomycin level; Vancomycin levels and labs will be monitored by [facility] and the draws will be done by [place] infusion trained nurse who works for [employer]     Follow up with Dr. Goode in [place], [place] on 11/9/22    [place] nurse who is trained in infusions and works for [employer] will be coming out twice a week to manage the PICC line, help with infusions, and draw labs. She has already established with the patient and family    Driveline changes are performed by the daughter in-law    Will follow up on Minocycline susceptibility and pass this information on to Dr. Rodríguez in the case oral suppressive therapy is needed for this infection    2. Anterior mediastinum fluid collection (decreasing since 8/20/22), possible loculated hematoma    3. Fever with rigors    Resolved    2 sets of blood cultures negative x48 hours    4. Diarrhea acute on chronic    C diff in September, 2022; treated PO vancomycin.     Recurrence of diarrhea after PO doxycyline; C diff positive but toxin negative (showing prior infection vs colonization 10/29/22) and no need to treat.    Possible medication-related diarrhea    Likely not infectious at this  "point; will defer further workup to patient's PCP    5. HFrEF due to ICM, s/p LVAD implantation (7/8/22)    6. UC positive for Proteus mirabilis     no UTI symptoms at the time of culture positivity; not clinically significant    I spent 90 minutes as part of a visit on the date of the encounter doing chart review, history and exam, documentation.      YAO Trevino, CNP  Infectious Diseases  Pager# [phone]          Interval History:       Patient heading home to [place] where he plans to finish his OPAT care and establish with infectious disease. Warm handoff already given by Dr. Baca to ID provider, Dr. Goode, with whom the patient will establish November, 9th. [facility] helping to coordinate the transition of OPAT to a pharmacy in [place] that will provide the remainder of the patient's IV vancomycin for OPAT and monitor the patient's vancomycin levels throughout therapy. Tentative end date for IV vancomycin 11/11-11/15 based on clinical improvement and to be determined by Dr. Rodríguez in [place]. Has enough vancomycin to make it to his first appointment with Dr. Rodríguez. Follows with Rheumatology in [place] and CellCept currently on hold with no current Lupus flare. Also has plans to establish with a PCP, first appointment on 11/4/22. Still having ongoing diarrhea. Denies any current UTI symptoms.        History of Present Illness:     Per Verna Millan PA-C's note 10/28/22:  \"Dandy Sands is a 62 year old male with history of SLE on hydroxychloroquine, antiphospholipid ab syndrome on warfarin, CAD, HFrEF due to ICM, s/p LVAD implantation (7/8/22), recent COVID-19 infection (10/13/22), C.diff (9/7/22), chronic medication-related diarrhea who presented to ED on 10/27/22 with nausea, vomiting, and purulent drainage from LVAD driveline. Drainge noted ~10/25, culture was collected 10/26 patient was started on doxycycline. Presented to ED 10/27 after developing fever, now with elevated CRP " "and WBC. CT (10/27) with \"trace encapsulated fluid about the driveline\" and mediastinal fluid collection that is slowly decreasing in size since 8/20/22 and favored to represent loculated hematoma. Fever with rigors concerning for possible bacteremia, blood cultures are pending. Has been started on vancomycin and zosyn- agree with continuing for now.      Cough- for several days, occasional green sputum production. CXR and CT Chest not c/w pneumonia. Recent hospitalization for COVID-19 (10/13/22).     Diarrhea- chronic diarrhea at baseline, thought to be medication related. Had C.diff in September 2022, treated with PO vancomycin. Admitted at end of course as diarrhea had not resolved, suspicion for infectious diarrhea low at that time. Monitor.\"      OPAT Plan for driveline infection:  Name of Antibiotic Dose of Antibiotic1 Pharmacy to assist with dosing Y/N Anticipated duration Effective start date2 End date   Vancomycin  Pharmacy to dose Y 2-4 weeks 10/28/22 11/11 or 11/25 pending clinical improvement                                     Review of Systems: Remaining systems all reviewed and negative.           Medications & Allergies:     Current Outpatient Medications   Medication     acetaminophen (TYLENOL) 325 MG tablet     atorvastatin (LIPITOR) 40 MG tablet     digoxin (LANOXIN) 125 MCG tablet     famotidine (PEPCID) 20 MG tablet     furosemide (LASIX) 20 MG tablet     hydrALAZINE (APRESOLINE) 25 MG tablet     hydrocortisone (CORTAID) 1 % external cream     hydrocortisone 1 % CREA cream     hydroxychloroquine (PLAQUENIL) 200 MG tablet     lisinopril (ZESTRIL) 10 MG tablet     loperamide (IMODIUM) 2 MG capsule     melatonin 5 MG tablet     methylcellulose (CITRUCEL) 500 MG TABS tablet     multivitamin w/minerals (THERA-VIT-M) tablet     pramox-pe-glycerin-petrolatum (PREPARATION H) 1-0.25-14.4-15 % CREA cream     saline nasal (AYR SALINE) GEL topical gel     tamsulosin (FLOMAX) 0.4 MG capsule     vancomycin " (VANCOCIN) 1500 mg/250 mL IVPB     warfarin ANTICOAGULANT (COUMADIN) 5 MG tablet     No current facility-administered medications for this visit.         No Known Allergies         Physical Exam:        There were no vitals filed for this visit. No vitals, this was a video visit    Physical Examination:  GENERAL: Healthy, alert and no distress  EYES: Eyes grossly normal to inspection.  No discharge or erythema, or obvious scleral/conjunctival abnormalities.  RESP: No audible wheeze, cough, or visible cyanosis.  No visible retractions or increased work of breathing.    SKIN: Visible skin clear. No significant rash, abnormal pigmentation or lesions.  NEURO: Cranial nerves grossly intact.  Mentation and speech appropriate for age.  PSYCH: Mentation appears normal, affect normal/bright, judgement and insight intact, normal speech and appearance well-groomed.    Microbiology Data   Pertinent Micro:    Culture   Date Value Ref Range Status   10/28/2022 2+ Corynebacterium striatum (A)  Incomplete     Comment:     Identification obtained by MALDI-TOF mass spectrometry research use only database. Test characteristics determined and verified by the Infectious Diseases Diagnostic Laboratory.Susceptibilities not routinely done   10/28/2022 10,000-50,000 CFU/mL Proteus mirabilis (A)  Final   10/27/2022 No Growth  Final   10/27/2022 No Growth  Final   10/26/2022 No Growth  Final   10/26/2022 No Growth  Final   10/26/2022 No anaerobic organisms isolated  Final   10/26/2022 1+ Corynebacterium striatum (A)  Final     Comment:     Identification obtained by MALDI-TOF mass spectrometry research use only database. Test characteristics determined and verified by the Infectious Diseases Diagnostic Laboratory.Susceptibilities not routinely done   08/04/2022 No Growth  Final   08/04/2022 No Growth  Final   08/03/2022 No Growth  Final   08/03/2022 No Growth  Final   08/03/2022 No Growth  Final   08/02/2022 No Growth  Final   08/02/2022  Positive on the 1st day of incubation (A)  Final   08/02/2022 Staphylococcus epidermidis (AA)  Final     Comment:     1 of 2 bottles   08/02/2022 Positive on the 1st day of incubation (A)  Final   08/02/2022 Enterococcus faecalis (AA)  Final     Comment:     1 of 2 bottles   08/01/2022 No Growth  Final   07/31/2022 No Growth  Final       Last check of C difficile  C Difficile Toxin B by PCR   Date Value Ref Range Status   10/29/2022 Positive (A) Negative Final     Comment:     Detection of C. difficile nucleic acid in stools confirms the presence of these organisms in diarrheal patients but may not indicate that C. difficile is the etiologic agent of the diarrhea. Results from the Xpert C. difficile assay should be interpreted in conjunction with other laboratory and clinical data available to the clinician.    Patients with a positive C. difficile PCR result will receive reflex GDH/Toxin Immunoassay testing. Please interpret the PCR test result in conjunction with GDH/Toxin Immunoassay results and the clinical status of patient.       Urine Studies     Recent Labs   Lab Test 10/28/22  0926 09/19/22  0023 09/10/22  1222 08/31/22  1542 08/03/22  1110 07/21/22  2239   URINEPH 6.0 5.5 5.5 5.5 7.0 5.5   NITRITE Negative Negative Negative Negative Negative Negative   LEUKEST Negative Negative Negative Negative Negative Negative   WBCU 1 <1  --  4 21* 1       CSF testing   No lab results found.    Invalid input(s): CADAM, EVPCR, ENTPCR, ENTEROVIRUS    Laboratory Data:   Metabolic Studies       Recent Labs   Lab Test 11/03/22  0522 11/02/22  0611 10/29/22  0615 10/28/22  1957 10/28/22  0533 10/28/22  0045 10/27/22  2212 09/05/22  0617 09/04/22  2234 06/19/22  2157 06/19/22  1522 05/20/22  1715 05/20/22  0516    140   < >  --    < >  --  134*   < > 137   < > 138   < > 138   POTASSIUM 3.8 3.9   < >  --    < >  --  4.2   < > 4.2   < > 4.0   < > 4.2   CHLORIDE 108* 109*   < >  --    < >  --  101   < > 106   < > 107   < >  105   CO2 22 20*   < >  --    < >  --  20*   < > 16*   < > 26   < > 24   ANIONGAP 9 11   < >  --    < >  --  13   < > 15   < > 5   < > 9   BUN 14.3 11.0   < >  --    < >  --  13.9   < > 17.6   < > 10   < > 16   CR 0.70 0.57*   < >  --    < >  --  0.72   < > 0.59*   < > 0.78   < > 0.92   GFRESTIMATED >90 >90   < >  --    < >  --  >90   < > >90   < > >90   < > >90   GLC 96 98   < >  --    < >  --  100*   < > 112*   < > 85   < > 87   A1C  --   --   --   --   --   --   --   --   --   --   --   --  5.5   ELDA 8.6* 8.8   < >  --    < >  --  8.9   < > 9.5   < > 8.2*   < > 8.4*   PHOS  --   --   --   --   --   --   --   --  3.8   < >  --    < >  --    MAG  --  1.7   < >  --    < >  --   --    < > 1.6*   < >  --    < > 2.1   LACT  --   --   --  1.6  --   --  1.3   < >  --    < > 1.1   < >  --    PCAL  --   --   --   --   --  0.07*  --   --   --   --   --   --   --    CKT  --   --   --   --   --   --   --   --   --   --  37  --   --     < > = values in this interval not displayed.       Hepatic Studies    Recent Labs   Lab Test 11/03/22  0522 11/02/22  0611 11/01/22  0754 10/28/22  0852 10/28/22  0533 10/27/22  2212 10/16/22  0640 10/14/22  1742   BILITOTAL 0.3 0.3 0.3   < >  --  0.4   < > 0.5   DBIL  --   --   --   --   --   --   --  0.20   ALKPHOS 131* 117 118   < >  --  124   < > 82   PROTTOTAL 6.8 6.5 6.8   < >  --  7.5   < > 6.4   ALBUMIN 3.3* 2.9* 3.1*   < >  --  3.6   < > 3.5   AST 20 20 18   < >  --  21   < > 21   ALT 12 9* 8*   < >  --  11   < > 16   LDH  --   --   --   --  308* 260*   < > 203    < > = values in this interval not displayed.       Hematology Studies      Recent Labs   Lab Test 11/03/22  0522 11/02/22  1528 11/02/22  0611 11/01/22  0755 10/31/22  0641 10/30/22  0653 07/20/22  1523 07/20/22  0949 07/20/22  0355   WBC 7.6 8.5 6.5 6.3 5.9 7.1   < > 15.9* 13.9*   ANEU  --   --   --   --   --   --   --  14.6* 11.8*   ALYM  --   --   --   --   --   --   --  0.2* 1.0   GERALDO  --   --   --   --   --   --   --   0.5 0.4   AEOS  --   --   --   --   --   --   --  0.5 0.1   HGB 8.6* 9.4* 8.6* 8.8* 8.6* 8.4*   < > 8.5* 8.1*   HCT 27.5* 29.2* 28.3* 28.2* 27.1* 26.7*   < > 27.0* 25.4*    289 262 251 223 203   < > 150 157    < > = values in this interval not displayed.       Medication levels    Recent Labs   Lab Test 11/03/22  0522   VANCOMYCIN 14.4       Inflammatory Markers    Recent Labs   Lab Test 10/28/22  0533 10/27/22  2212 10/26/22  1604 10/18/22  0606 10/17/22  0534 10/16/22  0640 08/03/22  0606 06/19/22  1522   SED  --   --   --   --   --   --   --  39*   .00* 86.70* 83.70* 29.70* 50.30* 70.80*   < > 28.0*    < > = values in this interval not displayed.       Body fluid stats  No lab results found.    Imaging:  Results for orders placed or performed during the hospital encounter of 10/27/22   XR Chest 2 Views    Narrative    EXAM: XR CHEST 2 VIEWS  LOCATION: [facility]  DATE/TIME: 10/27/2022 10:50 PM    INDICATION: Fever; LVAD  COMPARISON: 10/13/2022.      Impression    IMPRESSION: Lungs are clear. Chronic blunting of the right costophrenic angle. No acute pleural effusion. No pneumothorax.    Unchanged cardiomegaly. Postsurgical changes of median sternotomy. Left chest wall pacer/defibrillator device is present. A left ventricular device is additionally noted, which appears in unchanged position. Surgical clips overlie the right axilla. An   additional metallic density is partially imaged at the mid abdomen, incompletely evaluated.   XR Chest Port 1 View    Narrative    XR CHEST PORT 1 VIEW  11/1/2022 10:06 AM      HISTORY: 62-year-old male status post PICC placement.     COMPARISON: 10/27/2022    FINDINGS:   Portable 45 degree AP projection radiograph of the chest. Coronary  stent. Right-sided PICC tip projects over the superior cavoatrial  junction. Left chest wall pacer/defibrillator leads in stable  position. LVAD unchanged position. Sternotomy wires  intact. Trachea is  midline. Cardiac silhouette is stable. Unchanged blunting of the right  costophrenic angle. No appreciable pneumothorax. No focal airspace  opacity.        Impression    IMPRESSION:   1.  PICC tip projects over the superior cavoatrial junction.  2.  No pneumothorax or focal air space opacities.    I have personally reviewed the examination and initial interpretation  and I agree with the findings.    GABRIEL COURTNEY MD         SYSTEM ID:  [id number]

## 2022-11-06 ENCOUNTER — HOME INFUSION (PRE-WILLOW HOME INFUSION) (OUTPATIENT)
Dept: PHARMACY | Facility: CLINIC | Age: 62
End: 2022-11-06

## 2022-11-06 LAB — INR (EXTERNAL): 2.39 (ref 0.9–1.1)

## 2022-11-07 ENCOUNTER — ANTICOAGULATION THERAPY VISIT (OUTPATIENT)
Dept: ANTICOAGULATION | Facility: CLINIC | Age: 62
End: 2022-11-07

## 2022-11-07 ENCOUNTER — CARE COORDINATION (OUTPATIENT)
Dept: CARDIOLOGY | Facility: CLINIC | Age: 62
End: 2022-11-07

## 2022-11-07 ENCOUNTER — TELEPHONE (OUTPATIENT)
Dept: CARDIOLOGY | Facility: CLINIC | Age: 62
End: 2022-11-07

## 2022-11-07 DIAGNOSIS — I50.22 CHRONIC SYSTOLIC CONGESTIVE HEART FAILURE (H): Primary | ICD-10-CM

## 2022-11-07 DIAGNOSIS — I50.20 HEART FAILURE WITH REDUCED EJECTION FRACTION, NYHA CLASS III (H): ICD-10-CM

## 2022-11-07 DIAGNOSIS — Z95.811 LEFT VENTRICULAR ASSIST DEVICE PRESENT (H): ICD-10-CM

## 2022-11-07 DIAGNOSIS — Z95.811 LVAD (LEFT VENTRICULAR ASSIST DEVICE) PRESENT (H): ICD-10-CM

## 2022-11-07 LAB — INR (EXTERNAL): 2.6 (ref 0.9–1.1)

## 2022-11-07 NOTE — PROGRESS NOTES
Called pt to check in after moving home to [place] over the weekend. Pt reports he went to [facility] ED late Saturday night due to SOB and fever.   Pt is being worked up for further infection by cardiology team in . He is happy to be back locally and is feeling better today than on admission yesterday. SOB is improving. He remains on IV abx for DL infections. He has no questions or concerns at this time. Will continue to follow progress via care everywhere and check in with pt later this week.

## 2022-11-07 NOTE — CONFIDENTIAL NOTE
11/7/22    Writer spoke to Shauna inpatient pharmacist.  Reviewed Warfarin dosing.  Patient will not discharge today, perhaps tomorrow or Wednesday.  Routed to [place] to follow up once patient discharges from OSH.  LITA Lance Anna M, RN routed conversation to Anticoag Lvad 1 hour ago (1:59 PM)     Shanti Cantrell CMA routed conversation to Cardiology Heart Failure [facility] 1 hour ago (1:55 PM)     Gaurav Neville routed conversation to [facility] Adult [facility] 1 hour ago (1:54 PM)     Gaurav Neville 1 hour ago (1:54 PM)     [facility] Call Center     Phone Message     May a detailed message be left on voicemail: yes      Reason for Call: Other: Shauna the pharmacist from [facility] calling to verify patient's Warfarin dosing. Please call her back to discuss.      Thank you!  Specialty Access Center        Action Taken: Other: Cardiology     Travel Screening: Not Applicable                                                                                                                                                                                                                                                                                                                                                                                                                                                                                                                                                                                                                                                                                             Note      Shauna [phone]

## 2022-11-07 NOTE — TELEPHONE ENCOUNTER
Health Call Center    Phone Message    May a detailed message be left on voicemail: yes     Reason for Call: Other: Shauna the pharmacist from [facility] calling to verify patient's Warfarin dosing. Please call her back to discuss.     Thank you!  Specialty Access Center      Action Taken: Other: Cardiology    Travel Screening: Not Applicable

## 2022-11-09 ENCOUNTER — DOCUMENTATION ONLY (OUTPATIENT)
Dept: ANTICOAGULATION | Facility: CLINIC | Age: 62
End: 2022-11-09

## 2022-11-09 DIAGNOSIS — Z95.811 LEFT VENTRICULAR ASSIST DEVICE PRESENT (H): ICD-10-CM

## 2022-11-09 DIAGNOSIS — Z95.811 LVAD (LEFT VENTRICULAR ASSIST DEVICE) PRESENT (H): ICD-10-CM

## 2022-11-09 DIAGNOSIS — I50.20 HEART FAILURE WITH REDUCED EJECTION FRACTION, NYHA CLASS III (H): ICD-10-CM

## 2022-11-09 DIAGNOSIS — I50.22 CHRONIC SYSTOLIC CONGESTIVE HEART FAILURE (H): Primary | ICD-10-CM

## 2022-11-09 LAB
INR (EXTERNAL): 2.46 (ref 0.9–1.1)
INR (EXTERNAL): 2.5 (ref 0.9–1.1)

## 2022-11-09 NOTE — PROGRESS NOTES
This is a recent snapshot of the patient's [facility] medical record.  For current drug dose and complete information and questions, call [phone] or In Basket pool, [facility] infusion ([id number])  CSN Number:  [id number]

## 2022-11-09 NOTE — PROGRESS NOTES
Addendum 22 Received via fax discharge summary from [facility] in [place]  Updated dosing calendar with results and dosing.   Riri Giron RN    ANTICOAGULATION  MANAGEMENT: Discharge Review    Dandy Sands chart reviewed for anticoagulation continuity of care --he did not discharge as expected. Discharge delayed until 22 - 22    Hospital Admission on 22 - pending for fever,chills, SOB;  Chronic drive line infection.   Anticipated discharge date is  - .    Discharge disposition: hospitalized    Results:    Recent labs: (last 7 days)     22  0522 22  1050 22  0000 22  0300   INR 2.30* 2.15* 2.39* 2.60*     Anticoagulation inpatient management:     see warfarin dosing calendar    Anticoagulation discharge instructions:     Warfarin dosinmg daily   Bridging: No   NR goal change: No      Medication changes affecting anticoagulation: Yes: on daptomycin IV.  Will get through [facility] (changed from [facility]). Also on Ceftriaxone    Additional factors affecting anticoagulation: Yes: recovering from hospital stay; recent Cdiff     PLAN     Discharge was anticipated to be today, 22.  Received discharge progress note from inpatient pharmacist at [facility] in [place].    Discharge has been delayed.  Anticipated date of discharge is between 22- 22.  Warfarin dosing calendar has been updated with dosing he took in hospital up to .  Leatha Abbott RN

## 2022-11-10 LAB — INR (EXTERNAL): 2.53 (ref 0.9–1.1)

## 2022-11-10 NOTE — PROGRESS NOTES
This is a recent snapshot of the patient's [facility] medical record.  For current drug dose and complete information and questions, call [phone] or In Basket pool, [facility] home infusion ([id number])  CSN Number:  [id number]

## 2022-11-11 ENCOUNTER — CARE COORDINATION (OUTPATIENT)
Dept: CARDIOLOGY | Facility: CLINIC | Age: 62
End: 2022-11-11

## 2022-11-11 LAB — INR (EXTERNAL): 2.58 (ref 0.9–1.1)

## 2022-11-11 NOTE — PROGRESS NOTES
Called patient/caregiver to check in 6 weeks post discharge. Pt reports VAD parameters WNL and weight stable at 134lb. Pt remains hospitalized at [facility]. Team is still finalizing antibiotic plan for DLES infection. Reviewed medications and answered any questions. Patient reports sleeping well and no anxiety since being home with LVAD. Patient is able to move around the house and care for himself with assistance - uses walker for longer distances and for stability.     Discussed specific new problems/stressors since being discharged from the hospital: none at this time. Empathized with patient and reviewed coping strategies: enlisting support from friends and love ones, attending patient and caregiver support groups, reviewing LVAD educational materials to reinforce knowledge, and talking about concerns with family/care providers/trusted others. Encouraged pt to page VAD Coordinator with any issues or questions. Pt verbalizes understanding.

## 2022-11-12 LAB — INR (EXTERNAL): 2.52 (ref 0.9–1.1)

## 2022-11-13 LAB — INR (EXTERNAL): 2.53 (ref 0.9–1.1)

## 2022-11-14 ENCOUNTER — CARE COORDINATION (OUTPATIENT)
Dept: CARDIOLOGY | Facility: CLINIC | Age: 62
End: 2022-11-14

## 2022-11-14 LAB — INR (EXTERNAL): 2.61 (ref 0.9–1.1)

## 2022-11-14 NOTE — PROGRESS NOTES
D: Received call from patient's son regarding his discharge from [facility].     I/A: Patient's son states patient was started on Plavix at hospital discharge- concerned about this with patient's bleeding history. Also is on two different IV antibiotics now- reminded him to touch base with coumadin clinic, I will also send message to them.   Also wondering about a refill of atarax- they were not sent home with it from the hospital- I advised them to call the hospital back to enquire about this.    P: Discussed with Dr. Lora who does not think patient should take plavix- will re-start aspirin 81mg/day. Will discuss with primary coordinator Chantal Brasher.  Family notified to page on-call coordinator if symptoms worsen or with other concerns. Family verbalized understanding.

## 2022-11-14 NOTE — PROGRESS NOTES
Paged by SAJAN Villarreal at [place] who reports that Dandy is discharging from the hospital today. Patient was admitted on 11/6 for a recurrent driveline infection.       Patient set up for home antibiotic infusions. VAD Coordinator will call and follow up with patient tomorrow.

## 2022-11-15 ENCOUNTER — CARE COORDINATION (OUTPATIENT)
Dept: CARDIOLOGY | Facility: CLINIC | Age: 62
End: 2022-11-15

## 2022-11-15 NOTE — PROGRESS NOTES
seble pt to check in after d/c from [facility]. Pt reports he is doing well. He is on 2 IV abx that he administers at home. He goes to the infusion clinic weekly on mondays for labs and PICC line dressing change. Pt reports drainage from DLES is improving. Rash surrounding dressing is also improving.   Pt requesting refill for atarax - discussed that if he is not bill to get refill from hospital, will discuss with Dr. Lora refilling only about 1 week, to get him through to PCP visit. Pt is also questioning restarting ASA - he had been taken off this med prior due to frequent epistaxis. Will review and clarify again with Dr. Lora. Pt reports he is scheduled to start cardiac rehab next week. No other questions or concerns at this time.

## 2022-11-16 NOTE — PROGRESS NOTES
11/16/22 Update: clarified with Dr. Lora. Pt should restart ASA 81mg po daily. Called pt to update. Pt verbalized understanding. Instructed pt to page the VAD Coord on Call if he has nosebleeds.

## 2022-11-18 DIAGNOSIS — I50.20 HEART FAILURE WITH REDUCED EJECTION FRACTION, NYHA CLASS III (H): ICD-10-CM

## 2022-11-18 RX ORDER — HYDRALAZINE HYDROCHLORIDE 25 MG/1
25 TABLET, FILM COATED ORAL 3 TIMES DAILY
Qty: 270 TABLET | Refills: 3 | Status: SHIPPED | OUTPATIENT
Start: 2022-11-18 | End: 2022-12-12

## 2022-11-22 ENCOUNTER — ANTICOAGULATION THERAPY VISIT (OUTPATIENT)
Dept: ANTICOAGULATION | Facility: CLINIC | Age: 62
End: 2022-11-22

## 2022-11-22 ENCOUNTER — TRANSFERRED RECORDS (OUTPATIENT)
Dept: HEALTH INFORMATION MANAGEMENT | Facility: CLINIC | Age: 62
End: 2022-11-22

## 2022-11-22 DIAGNOSIS — I50.20 HEART FAILURE WITH REDUCED EJECTION FRACTION, NYHA CLASS III (H): ICD-10-CM

## 2022-11-22 DIAGNOSIS — Z95.811 LVAD (LEFT VENTRICULAR ASSIST DEVICE) PRESENT (H): ICD-10-CM

## 2022-11-22 DIAGNOSIS — I50.22 CHRONIC SYSTOLIC CONGESTIVE HEART FAILURE (H): Primary | ICD-10-CM

## 2022-11-22 DIAGNOSIS — Z95.811 LEFT VENTRICULAR ASSIST DEVICE PRESENT (H): ICD-10-CM

## 2022-11-22 LAB — INR (EXTERNAL): 1.88 (ref 0.9–1.1)

## 2022-11-22 NOTE — PROGRESS NOTES
ANTICOAGULATION MANAGEMENT     Dandy Sands 62 year old male is on warfarin with subtherapeutic INR result. (Goal INR 2.0-3.0)    Recent labs: (last 7 days)     11/22/22  1459   INR 1.88*       ASSESSMENT       Source(s): Chart Review and Patient/Caregiver Call       Warfarin doses taken: Warfarin taken as instructed    Diet: Hospitalization may be affecting diet and INR    New illness, injury, or hospitalization: Yes: discharged from OSH    Medication/supplement changes: IV antibiotics    Signs or symptoms of bleeding or clotting: No    Previous INR: Therapeutic last 2(+) visits    Additional findings: Consulted with Pepper.  Patient recently discharged from outside hospital.        PLAN     Recommended plan for ongoing change(s) affecting INR     Dosing Instructions: Increase your warfarin dose (7.1% change) with next INR in 6 days       Summary  As of 11/22/2022    Full warfarin instructions:  11/22: 7.5 mg; Otherwise 7.5 mg every Tue; 5 mg all other days; Starting 11/22/2022   Next INR check:  11/28/2022             Telephone call with Dandy who verbalizes understanding and agrees to plan and who agrees to plan and repeated back plan correctly    Patient using outside facility for labs    Education provided:     Taking warfarin: take warfarin at same time each day; preferably in the evening and Importance of taking warfarin as instructed    Symptom monitoring: monitoring for bleeding signs and symptoms and monitoring for clotting signs and symptoms    Importance of notifying anticoagulation clinic for: changes in medications; a sooner lab recheck maybe needed, diarrhea, nausea/vomiting, reduced intake, cold/flu, and/or infections; a sooner lab recheck maybe needed, upcoming surgeries and procedures 2 weeks in advance (Dandy will be having some dental work completed in the future) and if you did not receive dosing instructions on the same day as your labs were checked    Plan made with [facility] Pharmacist Pepper Sneed  and per LVAD protocol    Chon Tinsley, RN  Anticoagulation Clinic  11/22/2022    _______________________________________________________________________     Anticoagulation Episode Summary     Current INR goal:  2.0-3.0   TTR:  88.5 % (1.4 mo)   Target end date:  Indefinite   Send INR reminders to:  ANTICOAG LVAD    Indications    Chronic systolic congestive heart failure (H) [I50.22]  LVAD (left ventricular assist device) present (H) [Z95.811]  Heart failure with reduced ejection fraction  NYHA class III (H) [I50.20]  Left ventricular assist device present (H) [Z95.811]           Comments:  Follow VAD Anticoag protocol:Yes: HeartMate 3   Bridging: No bridging epistaxis.   Date VAD placed: 7/8/22         Anticoagulation Care Providers     Provider Role Specialty Phone number    Kelly Lora MD Referring Cardiovascular Disease [phone]

## 2022-11-28 ENCOUNTER — ANTICOAGULATION THERAPY VISIT (OUTPATIENT)
Dept: ANTICOAGULATION | Facility: CLINIC | Age: 62
End: 2022-11-28

## 2022-11-28 DIAGNOSIS — I50.22 CHRONIC SYSTOLIC CONGESTIVE HEART FAILURE (H): Primary | ICD-10-CM

## 2022-11-28 DIAGNOSIS — I50.20 HEART FAILURE WITH REDUCED EJECTION FRACTION, NYHA CLASS III (H): ICD-10-CM

## 2022-11-28 DIAGNOSIS — Z95.811 LEFT VENTRICULAR ASSIST DEVICE PRESENT (H): ICD-10-CM

## 2022-11-28 DIAGNOSIS — Z95.811 LVAD (LEFT VENTRICULAR ASSIST DEVICE) PRESENT (H): ICD-10-CM

## 2022-11-28 LAB — INR (EXTERNAL): 1.88 (ref 0.9–1.1)

## 2022-11-28 NOTE — PROGRESS NOTES
Last encounter-maintenance dose increase 7.1% recommended     ANTICOAGULATION MANAGEMENT     Dandy Sands 62 year old male is on warfarin with subtherapeutic INR result. (Goal INR 2.0-3.0)    Recent labs: (last 7 days)     11/28/22  1251   INR 1.88*       ASSESSMENT       Source(s): Chart Review and Patient/Caregiver Call       Warfarin doses taken: Warfarin taken as instructed-he accidentally took PM pills this AM-he will take 2.5 mg tonight for a total of 7.5 mg today    Diet: No new diet changes identified    New illness, injury, or hospitalization: No    Medication/supplement changes: continued on IV abx with possibility of converting to PO after ID appt 12/1    Signs or symptoms of bleeding or clotting: No    Previous INR: Subtherapeutic    Additional findings: None       PLAN     Recommended plan for ongoing change(s) affecting INR     Dosing Instructions: Increase your warfarin dose (6.7% change) with next INR in 3 days       Summary  As of 11/28/2022    Full warfarin instructions:  7.5 mg every Mon, Thu; 5 mg all other days; Starting 11/28/2022   Next INR check:  12/1/2022             Telephone call with  sonAmos who verbalizes understanding and agrees to plan and who agrees to plan and repeated back plan correctly    Patient using outside facility for labs    Education provided:     Contact [phone] with any changes, questions or concerns.     Plan made per [facility] anticoagulation protocol and per LVAD protocol    LORENA MOSES RN  Anticoagulation Clinic  11/28/2022    _______________________________________________________________________     Anticoagulation Episode Summary     Current INR goal:  2.0-3.0   TTR:  77.4 % (1.6 mo)   Target end date:  Indefinite   Send INR reminders to:  ANTICOAG LVAD    Indications    Chronic systolic congestive heart failure (H) [I50.22]  LVAD (left ventricular assist device) present (H) [Z95.811]  Heart failure with reduced ejection fraction  NYHA class III (H)  [I50.20]  Left ventricular assist device present (H) [Z95.181]           Comments:  Follow VAD Anticoag protocol:Yes: HeartMate 3   Bridging: No bridging epistaxis.   Date VAD placed: 7/8/22         Anticoagulation Care Providers     Provider Role Specialty Phone number    Kelly Lora MD Referring Cardiovascular Disease [phone]

## 2022-11-29 ENCOUNTER — CARE COORDINATION (OUTPATIENT)
Dept: CARDIOLOGY | Facility: CLINIC | Age: 62
End: 2022-11-29

## 2022-11-29 DIAGNOSIS — I50.9 DECOMPENSATED HEART FAILURE (H): ICD-10-CM

## 2022-11-29 RX ORDER — DIGOXIN 125 MCG
125 TABLET ORAL DAILY
Qty: 90 TABLET | Refills: 3 | Status: ON HOLD | OUTPATIENT
Start: 2022-11-29 | End: 2024-06-07

## 2022-11-29 NOTE — PROGRESS NOTES
Called patient/caregiver to check in 8 weeks post discharge. Pt reports VAD parameters WNL and weight stable at 140.1lb - stays within a 2-3lb range. Reviewed medications and answered any questions. Patient reports sleeping well and no anxiety since being home with LVAD. Patient is able to move around the house and care for him/self independently.      Discussed specific new problems/stressors since being discharged from the hospital: pt recently started cardiac rehab and it is going well. He has a f/u with ID locally tomorrow where they will review if continued IV abx are necessary. Pt still reports slight drainage at the site. Rash surrounding site is resolved, but pt still does have some pain at the site only with bending. Empathized with patient and reviewed coping strategies: enlisting support from friends and love ones, attending patient and caregiver support groups, reviewing LVAD educational materials to reinforce knowledge, and talking about concerns with family/care providers/trusted others. Encouraged pt to page VAD Coordinator with any issues or questions. Pt verbalizes understanding.    Dental extractions planned  - review medical clearance and get INR goal from provider. They are ok to perform extractions with INR less than 3. No need to hold coumadin.

## 2022-12-01 ENCOUNTER — ANTICOAGULATION THERAPY VISIT (OUTPATIENT)
Dept: ANTICOAGULATION | Facility: CLINIC | Age: 62
End: 2022-12-01

## 2022-12-01 DIAGNOSIS — I50.22 CHRONIC SYSTOLIC CONGESTIVE HEART FAILURE (H): Primary | ICD-10-CM

## 2022-12-01 DIAGNOSIS — Z95.811 LEFT VENTRICULAR ASSIST DEVICE PRESENT (H): ICD-10-CM

## 2022-12-01 DIAGNOSIS — I50.20 HEART FAILURE WITH REDUCED EJECTION FRACTION, NYHA CLASS III (H): ICD-10-CM

## 2022-12-01 DIAGNOSIS — Z95.811 LVAD (LEFT VENTRICULAR ASSIST DEVICE) PRESENT (H): ICD-10-CM

## 2022-12-01 LAB — INR (EXTERNAL): 2.23 (ref 0.9–1.1)

## 2022-12-01 NOTE — PROGRESS NOTES
ANTICOAGULATION MANAGEMENT     Dandy Sands 62 year old male is on warfarin with therapeutic INR result. (Goal INR 2.0-3.0)    Recent labs: (last 7 days)     12/01/22  1417   INR 2.23*       ASSESSMENT       Source(s): Chart Review and Patient/Caregiver Call       Warfarin doses taken: Warfarin taken as instructed    Diet: No new diet changes identified    New illness, injury, or hospitalization: No    Medication/supplement changes: None noted    Signs or symptoms of bleeding or clotting: No    Previous INR: Subtherapeutic    Additional findings: None       PLAN     Recommended plan for no diet, medication or health factor changes affecting INR     Dosing Instructions: Continue your current warfarin dose with next INR in 5 days       Summary  As of 12/1/2022    Full warfarin instructions:  7.5 mg every Mon, Thu; 5 mg all other days; Starting 12/1/2022   Next INR check:  12/6/2022             Telephone call with Dandy who agrees to plan and repeated back plan correctly    Patient using outside facility for labs    Education provided:     Taking warfarin: take warfarin at same time each day; preferably in the evening, importance of following [facility] instructions vs instructions on the prescription bottle and Importance of taking warfarin as instructed    Symptom monitoring: monitoring for bleeding signs and symptoms and monitoring for clotting signs and symptoms    Importance of notifying anticoagulation clinic for: changes in medications; a sooner lab recheck maybe needed, diarrhea, nausea/vomiting, reduced intake, cold/flu, and/or infections; a sooner lab recheck maybe needed, upcoming surgeries and procedures 2 weeks in advance and if you did not receive dosing instructions on the same day as your labs were checked    Plan made per [facility] anticoagulation protocol and per LVAD protocol    Chon Tinsley, RN  Anticoagulation Clinic  12/1/2022    _______________________________________________________________________      Anticoagulation Episode Summary     Current INR goal:  2.0-3.0   TTR:  76.9 % (1.7 mo)   Target end date:  Indefinite   Send INR reminders to:  ANTICOAG LVAD    Indications    Chronic systolic congestive heart failure (H) [I50.22]  LVAD (left ventricular assist device) present (H) [Z95.811]  Heart failure with reduced ejection fraction  NYHA class III (H) [I50.20]  Left ventricular assist device present (H) [Z95.811]           Comments:  Follow VAD Anticoag protocol:Yes: HeartMate 3   Bridging: No bridging epistaxis.   Date VAD placed: 7/8/22         Anticoagulation Care Providers     Provider Role Specialty Phone number    Kelly Lora MD Referring Cardiovascular Disease [phone]

## 2022-12-06 ENCOUNTER — CARE COORDINATION (OUTPATIENT)
Dept: CARDIOLOGY | Facility: CLINIC | Age: 62
End: 2022-12-06

## 2022-12-06 ENCOUNTER — ANTICOAGULATION THERAPY VISIT (OUTPATIENT)
Dept: ANTICOAGULATION | Facility: CLINIC | Age: 62
End: 2022-12-06

## 2022-12-06 DIAGNOSIS — I50.20 HEART FAILURE WITH REDUCED EJECTION FRACTION, NYHA CLASS III (H): ICD-10-CM

## 2022-12-06 DIAGNOSIS — Z95.811 LVAD (LEFT VENTRICULAR ASSIST DEVICE) PRESENT (H): ICD-10-CM

## 2022-12-06 DIAGNOSIS — Z95.811 LEFT VENTRICULAR ASSIST DEVICE PRESENT (H): ICD-10-CM

## 2022-12-06 DIAGNOSIS — I50.22 CHRONIC SYSTOLIC CONGESTIVE HEART FAILURE (H): Primary | ICD-10-CM

## 2022-12-06 LAB — INR (EXTERNAL): 1.7 (ref 0.9–1.1)

## 2022-12-06 RX ORDER — AMOXICILLIN 500 MG/1
2000 CAPSULE ORAL PRN
Qty: 4 CAPSULE | Refills: 3 | Status: SHIPPED | OUTPATIENT
Start: 2022-12-06 | End: 2024-10-02

## 2022-12-06 NOTE — PROGRESS NOTES
12/6 Patient is scheduled for 4 teeth to be extracted on 12/20.  INR needs to be <2.5 for this procedure. [facility]

## 2022-12-06 NOTE — PROGRESS NOTES
Pt has dental extraction of 4 teeth scheduled for 12/20/22 at [facility] in [place], [place].   Reviewed INR requirements with dental clinic. They are requesting INR of 2.5 or lower at time of surgery. Pt does not have to hold coumadin. Pt will have INR drawn on 12/19. Reviewed plan with Dr. Loar. MD signed clearance form requested by [facility].   Message routed to Anticoagulation Clinic to make plan to bring INR into lower end of pt's goal prior to procedure.   Prescription for amoxicillin sent to pt's pharmacy with instructions to take 1hr prior to procedure. Called pt review instructions.

## 2022-12-06 NOTE — PROGRESS NOTES
ANTICOAGULATION MANAGEMENT     Dandy Sands 62 year old male is on warfarin with subtherapeutic INR result. (Goal INR 2.0-3.0)    Recent labs: (last 7 days)     12/06/22  0000   INR 1.7*       ASSESSMENT       Source(s): Patient/Caregiver Call        Warfarin doses taken: Warfarin taken as instructed    Diet: No new diet changes identified    New illness, injury, or hospitalization: No    Medication/supplement changes: PICC Line removed and patient started on Minocycline 100mg BID on 12/1    Signs or symptoms of bleeding or clotting: No    Previous INR: Therapeutic last visit; previously outside of goal range    Additional findings: Upcoming surgery/procedure Patient is scheduled for 4 teeth to be extracted on 12/20.  INR needs to be <2.5 for this procedure       PLAN     Recommended plan for no diet, medication or health factor changes affecting INR     Dosing Instructions: Increase your warfarin dose (6.2% change) with next INR in 6 days       Summary  As of 12/6/2022    Full warfarin instructions:  7.5 mg every Mon, Thu, Sat; 5 mg all other days; Starting 12/6/2022   Next INR check:  12/12/2022             Telephone call with  leena Sierra  who verbalizes understanding and agrees to plan and who agrees to plan and repeated back plan correctly    Lab visit scheduled    Education provided:     None required    Plan made per [facility] anticoagulation protocol and per LVAD protocol    Riri Giron RN  Anticoagulation Clinic  12/6/2022    _______________________________________________________________________     Anticoagulation Episode Summary     Current INR goal:  2.0-3.0   TTR:  73.2 % (1.8 mo)   Target end date:  Indefinite   Send INR reminders to:  ANTICOAG LVAD    Indications    Chronic systolic congestive heart failure (H) [I50.22]  LVAD (left ventricular assist device) present (H) [Z95.811]  Heart failure with reduced ejection fraction  NYHA class III (H) [I50.20]  Left ventricular assist device present (H)  [Q38.646]           Comments:  Follow VAD Anticoag protocol:Yes: HeartMate 3   Bridging: No bridging epistaxis.   Date VAD placed: 7/8/22         Anticoagulation Care Providers     Provider Role Specialty Phone number    Kelly Lora MD Referring Cardiovascular Disease [phone]

## 2022-12-09 DIAGNOSIS — I50.22 CHRONIC SYSTOLIC CONGESTIVE HEART FAILURE (H): Primary | ICD-10-CM

## 2022-12-12 ENCOUNTER — OFFICE VISIT (OUTPATIENT)
Dept: CARDIOLOGY | Facility: CLINIC | Age: 62
End: 2022-12-12
Attending: INTERNAL MEDICINE
Payer: COMMERCIAL

## 2022-12-12 ENCOUNTER — LAB (OUTPATIENT)
Dept: LAB | Facility: CLINIC | Age: 62
End: 2022-12-12
Payer: COMMERCIAL

## 2022-12-12 ENCOUNTER — OFFICE VISIT (OUTPATIENT)
Dept: CARDIOLOGY | Facility: CLINIC | Age: 62
End: 2022-12-12
Attending: NURSE PRACTITIONER
Payer: COMMERCIAL

## 2022-12-12 ENCOUNTER — PRE VISIT (OUTPATIENT)
Dept: CARDIOLOGY | Facility: CLINIC | Age: 62
End: 2022-12-12

## 2022-12-12 ENCOUNTER — ANTICOAGULATION THERAPY VISIT (OUTPATIENT)
Dept: ANTICOAGULATION | Facility: CLINIC | Age: 62
End: 2022-12-12

## 2022-12-12 ENCOUNTER — ANCILLARY PROCEDURE (OUTPATIENT)
Dept: CT IMAGING | Facility: CLINIC | Age: 62
End: 2022-12-12
Attending: NURSE PRACTITIONER
Payer: COMMERCIAL

## 2022-12-12 VITALS — SYSTOLIC BLOOD PRESSURE: 90 MMHG | BODY MASS INDEX: 23.78 KG/M2 | WEIGHT: 151.5 LBS | HEART RATE: 99 BPM | HEIGHT: 67 IN

## 2022-12-12 VITALS — BODY MASS INDEX: 23.79 KG/M2 | HEIGHT: 67 IN | HEART RATE: 99 BPM | SYSTOLIC BLOOD PRESSURE: 90 MMHG | WEIGHT: 151.6 LBS

## 2022-12-12 DIAGNOSIS — I50.22 CHRONIC SYSTOLIC CONGESTIVE HEART FAILURE (H): ICD-10-CM

## 2022-12-12 DIAGNOSIS — Z95.811 LVAD (LEFT VENTRICULAR ASSIST DEVICE) PRESENT (H): ICD-10-CM

## 2022-12-12 DIAGNOSIS — I50.22 CHRONIC SYSTOLIC CONGESTIVE HEART FAILURE (H): Primary | ICD-10-CM

## 2022-12-12 DIAGNOSIS — Z95.811 LEFT VENTRICULAR ASSIST DEVICE PRESENT (H): ICD-10-CM

## 2022-12-12 DIAGNOSIS — I50.20 HEART FAILURE WITH REDUCED EJECTION FRACTION, NYHA CLASS III (H): ICD-10-CM

## 2022-12-12 DIAGNOSIS — T82.110A FAILURE OF IMPLANTABLE CARDIOVERTER-DEFIBRILLATOR (ICD) LEAD, INITIAL ENCOUNTER: Primary | ICD-10-CM

## 2022-12-12 DIAGNOSIS — Z95.810 ICD (IMPLANTABLE CARDIOVERTER-DEFIBRILLATOR) IN PLACE: ICD-10-CM

## 2022-12-12 DIAGNOSIS — T82.198A MALFUNCTION OF IMPLANTABLE DEFIBRILLATOR VENTRICULAR (ICD) LEAD: ICD-10-CM

## 2022-12-12 LAB
ALBUMIN SERPL BCG-MCNC: 3.8 G/DL (ref 3.5–5.2)
ALP SERPL-CCNC: 119 U/L (ref 40–129)
ALT SERPL W P-5'-P-CCNC: 12 U/L (ref 10–50)
ANION GAP SERPL CALCULATED.3IONS-SCNC: 10 MMOL/L (ref 7–15)
AST SERPL W P-5'-P-CCNC: 19 U/L (ref 10–50)
BILIRUB SERPL-MCNC: 0.7 MG/DL
BUN SERPL-MCNC: 16.4 MG/DL (ref 8–23)
CALCIUM SERPL-MCNC: 9.7 MG/DL (ref 8.8–10.2)
CHLORIDE SERPL-SCNC: 103 MMOL/L (ref 98–107)
CREAT SERPL-MCNC: 0.8 MG/DL (ref 0.67–1.17)
CRP SERPL-MCNC: 59.8 MG/L
D DIMER PPP FEU-MCNC: 1.01 UG/ML FEU (ref 0–0.5)
DEPRECATED HCO3 PLAS-SCNC: 22 MMOL/L (ref 22–29)
ERYTHROCYTE [DISTWIDTH] IN BLOOD BY AUTOMATED COUNT: 16.9 % (ref 10–15)
GFR SERPL CREATININE-BSD FRML MDRD: >90 ML/MIN/1.73M2
GLUCOSE SERPL-MCNC: 97 MG/DL (ref 70–99)
HCT VFR BLD AUTO: 33.8 % (ref 40–53)
HGB BLD-MCNC: 10.6 G/DL (ref 13.3–17.7)
INR PPP: 1.83 (ref 0.85–1.15)
LDH SERPL L TO P-CCNC: 230 U/L (ref 0–250)
MCH RBC QN AUTO: 28.7 PG (ref 26.5–33)
MCHC RBC AUTO-ENTMCNC: 31.4 G/DL (ref 31.5–36.5)
MCV RBC AUTO: 92 FL (ref 78–100)
MDC_IDC_LEAD_IMPLANT_DT: NORMAL
MDC_IDC_LEAD_IMPLANT_DT: NORMAL
MDC_IDC_LEAD_LOCATION: NORMAL
MDC_IDC_LEAD_LOCATION: NORMAL
MDC_IDC_LEAD_LOCATION_DETAIL_1: NORMAL
MDC_IDC_LEAD_LOCATION_DETAIL_1: NORMAL
MDC_IDC_LEAD_MFG: NORMAL
MDC_IDC_LEAD_MFG: NORMAL
MDC_IDC_LEAD_MODEL: NORMAL
MDC_IDC_LEAD_MODEL: NORMAL
MDC_IDC_LEAD_POLARITY_TYPE: NORMAL
MDC_IDC_LEAD_POLARITY_TYPE: NORMAL
MDC_IDC_LEAD_SERIAL: NORMAL
MDC_IDC_LEAD_SERIAL: NORMAL
MDC_IDC_MSMT_BATTERY_DTM: NORMAL
MDC_IDC_MSMT_BATTERY_REMAINING_LONGEVITY: 51 MO
MDC_IDC_MSMT_BATTERY_RRT_TRIGGER: 2.73
MDC_IDC_MSMT_BATTERY_STATUS: NORMAL
MDC_IDC_MSMT_BATTERY_VOLTAGE: 2.97 V
MDC_IDC_MSMT_CAP_CHARGE_DTM: NORMAL
MDC_IDC_MSMT_CAP_CHARGE_ENERGY: 18 J
MDC_IDC_MSMT_CAP_CHARGE_TIME: 3.99
MDC_IDC_MSMT_CAP_CHARGE_TYPE: NORMAL
MDC_IDC_MSMT_LEADCHNL_RA_IMPEDANCE_VALUE: 342 OHM
MDC_IDC_MSMT_LEADCHNL_RA_PACING_THRESHOLD_AMPLITUDE: 0.5 V
MDC_IDC_MSMT_LEADCHNL_RA_PACING_THRESHOLD_PULSEWIDTH: 0.4 MS
MDC_IDC_MSMT_LEADCHNL_RA_SENSING_INTR_AMPL: 0.88 MV
MDC_IDC_MSMT_LEADCHNL_RA_SENSING_INTR_AMPL: 1.12 MV
MDC_IDC_MSMT_LEADCHNL_RV_IMPEDANCE_VALUE: 399 OHM
MDC_IDC_MSMT_LEADCHNL_RV_IMPEDANCE_VALUE: 475 OHM
MDC_IDC_MSMT_LEADCHNL_RV_PACING_THRESHOLD_AMPLITUDE: 3 V
MDC_IDC_MSMT_LEADCHNL_RV_PACING_THRESHOLD_PULSEWIDTH: 1 MS
MDC_IDC_MSMT_LEADCHNL_RV_SENSING_INTR_AMPL: 0.75 MV
MDC_IDC_MSMT_LEADCHNL_RV_SENSING_INTR_AMPL: 0.88 MV
MDC_IDC_PG_IMPLANT_DTM: NORMAL
MDC_IDC_PG_MFG: NORMAL
MDC_IDC_PG_MODEL: NORMAL
MDC_IDC_PG_SERIAL: NORMAL
MDC_IDC_PG_TYPE: NORMAL
MDC_IDC_SESS_CLINIC_NAME: NORMAL
MDC_IDC_SESS_DTM: NORMAL
MDC_IDC_SESS_TYPE: NORMAL
MDC_IDC_SET_BRADY_AT_MODE_SWITCH_RATE: 171 {BEATS}/MIN
MDC_IDC_SET_BRADY_HYSTRATE: NORMAL
MDC_IDC_SET_BRADY_LOWRATE: 50 {BEATS}/MIN
MDC_IDC_SET_BRADY_MAX_SENSOR_RATE: 115 {BEATS}/MIN
MDC_IDC_SET_BRADY_MAX_TRACKING_RATE: 130 {BEATS}/MIN
MDC_IDC_SET_BRADY_MODE: NORMAL
MDC_IDC_SET_BRADY_PAV_DELAY_LOW: 350 MS
MDC_IDC_SET_BRADY_SAV_DELAY_LOW: 350 MS
MDC_IDC_SET_LEADCHNL_RA_PACING_AMPLITUDE: 1.5 V
MDC_IDC_SET_LEADCHNL_RA_PACING_ANODE_ELECTRODE_1: NORMAL
MDC_IDC_SET_LEADCHNL_RA_PACING_ANODE_LOCATION_1: NORMAL
MDC_IDC_SET_LEADCHNL_RA_PACING_CAPTURE_MODE: NORMAL
MDC_IDC_SET_LEADCHNL_RA_PACING_CATHODE_ELECTRODE_1: NORMAL
MDC_IDC_SET_LEADCHNL_RA_PACING_CATHODE_LOCATION_1: NORMAL
MDC_IDC_SET_LEADCHNL_RA_PACING_POLARITY: NORMAL
MDC_IDC_SET_LEADCHNL_RA_PACING_PULSEWIDTH: 0.4 MS
MDC_IDC_SET_LEADCHNL_RA_SENSING_ANODE_ELECTRODE_1: NORMAL
MDC_IDC_SET_LEADCHNL_RA_SENSING_ANODE_LOCATION_1: NORMAL
MDC_IDC_SET_LEADCHNL_RA_SENSING_CATHODE_ELECTRODE_1: NORMAL
MDC_IDC_SET_LEADCHNL_RA_SENSING_CATHODE_LOCATION_1: NORMAL
MDC_IDC_SET_LEADCHNL_RA_SENSING_POLARITY: NORMAL
MDC_IDC_SET_LEADCHNL_RA_SENSING_SENSITIVITY: 0.15 MV
MDC_IDC_SET_LEADCHNL_RV_PACING_AMPLITUDE: 6 V
MDC_IDC_SET_LEADCHNL_RV_PACING_ANODE_ELECTRODE_1: NORMAL
MDC_IDC_SET_LEADCHNL_RV_PACING_ANODE_LOCATION_1: NORMAL
MDC_IDC_SET_LEADCHNL_RV_PACING_CAPTURE_MODE: NORMAL
MDC_IDC_SET_LEADCHNL_RV_PACING_CATHODE_ELECTRODE_1: NORMAL
MDC_IDC_SET_LEADCHNL_RV_PACING_CATHODE_LOCATION_1: NORMAL
MDC_IDC_SET_LEADCHNL_RV_PACING_POLARITY: NORMAL
MDC_IDC_SET_LEADCHNL_RV_PACING_PULSEWIDTH: 1 MS
MDC_IDC_SET_LEADCHNL_RV_SENSING_ANODE_ELECTRODE_1: NORMAL
MDC_IDC_SET_LEADCHNL_RV_SENSING_ANODE_LOCATION_1: NORMAL
MDC_IDC_SET_LEADCHNL_RV_SENSING_CATHODE_ELECTRODE_1: NORMAL
MDC_IDC_SET_LEADCHNL_RV_SENSING_CATHODE_LOCATION_1: NORMAL
MDC_IDC_SET_LEADCHNL_RV_SENSING_POLARITY: NORMAL
MDC_IDC_SET_LEADCHNL_RV_SENSING_SENSITIVITY: 0.3 MV
MDC_IDC_SET_ZONE_DETECTION_BEATS_DENOMINATOR: 40 {BEATS}
MDC_IDC_SET_ZONE_DETECTION_BEATS_NUMERATOR: 30 {BEATS}
MDC_IDC_SET_ZONE_DETECTION_INTERVAL: 280 MS
MDC_IDC_SET_ZONE_DETECTION_INTERVAL: 320 MS
MDC_IDC_SET_ZONE_DETECTION_INTERVAL: 350 MS
MDC_IDC_SET_ZONE_DETECTION_INTERVAL: 350 MS
MDC_IDC_SET_ZONE_DETECTION_INTERVAL: 400 MS
MDC_IDC_SET_ZONE_TYPE: NORMAL
MDC_IDC_STAT_AT_BURDEN_PERCENT: 0.1 %
MDC_IDC_STAT_AT_DTM_END: NORMAL
MDC_IDC_STAT_AT_DTM_START: NORMAL
MDC_IDC_STAT_BRADY_AP_VP_PERCENT: 0.01 %
MDC_IDC_STAT_BRADY_AP_VS_PERCENT: 0.09 %
MDC_IDC_STAT_BRADY_AS_VP_PERCENT: 0.05 %
MDC_IDC_STAT_BRADY_AS_VS_PERCENT: 99.86 %
MDC_IDC_STAT_BRADY_DTM_END: NORMAL
MDC_IDC_STAT_BRADY_DTM_START: NORMAL
MDC_IDC_STAT_BRADY_RA_PERCENT_PACED: 0.09 %
MDC_IDC_STAT_BRADY_RV_PERCENT_PACED: 0.06 %
MDC_IDC_STAT_EPISODE_RECENT_COUNT: 0
MDC_IDC_STAT_EPISODE_RECENT_COUNT: 9
MDC_IDC_STAT_EPISODE_RECENT_COUNT_DTM_END: NORMAL
MDC_IDC_STAT_EPISODE_RECENT_COUNT_DTM_START: NORMAL
MDC_IDC_STAT_EPISODE_TOTAL_COUNT: 0
MDC_IDC_STAT_EPISODE_TOTAL_COUNT: 1
MDC_IDC_STAT_EPISODE_TOTAL_COUNT: 1
MDC_IDC_STAT_EPISODE_TOTAL_COUNT: 10
MDC_IDC_STAT_EPISODE_TOTAL_COUNT: 36
MDC_IDC_STAT_EPISODE_TOTAL_COUNT_DTM_END: NORMAL
MDC_IDC_STAT_EPISODE_TOTAL_COUNT_DTM_START: NORMAL
MDC_IDC_STAT_EPISODE_TYPE: NORMAL
MDC_IDC_STAT_TACHYTHERAPY_ATP_DELIVERED_RECENT: 0
MDC_IDC_STAT_TACHYTHERAPY_ATP_DELIVERED_TOTAL: 0
MDC_IDC_STAT_TACHYTHERAPY_RECENT_DTM_END: NORMAL
MDC_IDC_STAT_TACHYTHERAPY_RECENT_DTM_START: NORMAL
MDC_IDC_STAT_TACHYTHERAPY_SHOCKS_ABORTED_RECENT: 0
MDC_IDC_STAT_TACHYTHERAPY_SHOCKS_ABORTED_TOTAL: 0
MDC_IDC_STAT_TACHYTHERAPY_SHOCKS_DELIVERED_RECENT: 0
MDC_IDC_STAT_TACHYTHERAPY_SHOCKS_DELIVERED_TOTAL: 1
MDC_IDC_STAT_TACHYTHERAPY_TOTAL_DTM_END: NORMAL
MDC_IDC_STAT_TACHYTHERAPY_TOTAL_DTM_START: NORMAL
PLATELET # BLD AUTO: 249 10E3/UL (ref 150–450)
POTASSIUM SERPL-SCNC: 4.4 MMOL/L (ref 3.4–5.3)
PROT SERPL-MCNC: 7.8 G/DL (ref 6.4–8.3)
RBC # BLD AUTO: 3.69 10E6/UL (ref 4.4–5.9)
SODIUM SERPL-SCNC: 135 MMOL/L (ref 136–145)
WBC # BLD AUTO: 7.8 10E3/UL (ref 4–11)

## 2022-12-12 PROCEDURE — 87070 CULTURE OTHR SPECIMN AEROBIC: CPT | Performed by: NURSE PRACTITIONER

## 2022-12-12 PROCEDURE — 93283 PRGRMG EVAL IMPLANTABLE DFB: CPT | Performed by: INTERNAL MEDICINE

## 2022-12-12 PROCEDURE — 93750 INTERROGATION VAD IN PERSON: CPT | Performed by: NURSE PRACTITIONER

## 2022-12-12 PROCEDURE — 82374 ASSAY BLOOD CARBON DIOXIDE: CPT | Performed by: NURSE PRACTITIONER

## 2022-12-12 PROCEDURE — 74177 CT ABD & PELVIS W/CONTRAST: CPT | Mod: GC | Performed by: STUDENT IN AN ORGANIZED HEALTH CARE EDUCATION/TRAINING PROGRAM

## 2022-12-12 PROCEDURE — 85027 COMPLETE CBC AUTOMATED: CPT | Performed by: PATHOLOGY

## 2022-12-12 PROCEDURE — 86140 C-REACTIVE PROTEIN: CPT | Performed by: PATHOLOGY

## 2022-12-12 PROCEDURE — 87075 CULTR BACTERIA EXCEPT BLOOD: CPT | Performed by: NURSE PRACTITIONER

## 2022-12-12 PROCEDURE — G0463 HOSPITAL OUTPT CLINIC VISIT: HCPCS

## 2022-12-12 PROCEDURE — 36415 COLL VENOUS BLD VENIPUNCTURE: CPT | Performed by: PATHOLOGY

## 2022-12-12 PROCEDURE — 85379 FIBRIN DEGRADATION QUANT: CPT | Performed by: NURSE PRACTITIONER

## 2022-12-12 PROCEDURE — 82947 ASSAY GLUCOSE BLOOD QUANT: CPT | Performed by: NURSE PRACTITIONER

## 2022-12-12 PROCEDURE — 71260 CT THORAX DX C+: CPT | Mod: GC | Performed by: STUDENT IN AN ORGANIZED HEALTH CARE EDUCATION/TRAINING PROGRAM

## 2022-12-12 PROCEDURE — 87205 SMEAR GRAM STAIN: CPT | Performed by: NURSE PRACTITIONER

## 2022-12-12 PROCEDURE — 99215 OFFICE O/P EST HI 40 MIN: CPT | Mod: 25 | Performed by: NURSE PRACTITIONER

## 2022-12-12 PROCEDURE — 99215 OFFICE O/P EST HI 40 MIN: CPT | Mod: 25 | Performed by: INTERNAL MEDICINE

## 2022-12-12 PROCEDURE — 85610 PROTHROMBIN TIME: CPT | Performed by: PATHOLOGY

## 2022-12-12 PROCEDURE — 83615 LACTATE (LD) (LDH) ENZYME: CPT | Performed by: NURSE PRACTITIONER

## 2022-12-12 PROCEDURE — 87040 BLOOD CULTURE FOR BACTERIA: CPT | Performed by: NURSE PRACTITIONER

## 2022-12-12 RX ORDER — SODIUM CHLORIDE 9 MG/ML
INJECTION, SOLUTION INTRAVENOUS CONTINUOUS
Status: CANCELLED | OUTPATIENT
Start: 2022-12-12

## 2022-12-12 RX ORDER — MINOCYCLINE HYDROCHLORIDE 100 MG/1
1 CAPSULE ORAL
Status: ON HOLD | COMMUNITY
Start: 2022-12-01 | End: 2023-03-26

## 2022-12-12 RX ORDER — LIDOCAINE 40 MG/G
CREAM TOPICAL
Status: CANCELLED | OUTPATIENT
Start: 2022-12-12

## 2022-12-12 RX ORDER — IOPAMIDOL 755 MG/ML
85 INJECTION, SOLUTION INTRAVASCULAR ONCE
Status: COMPLETED | OUTPATIENT
Start: 2022-12-12 | End: 2022-12-12

## 2022-12-12 RX ORDER — HYDRALAZINE HYDROCHLORIDE 25 MG/1
37.5 TABLET, FILM COATED ORAL 3 TIMES DAILY
Qty: 270 TABLET | Refills: 3 | Status: SHIPPED | OUTPATIENT
Start: 2022-12-12 | End: 2023-01-12

## 2022-12-12 RX ORDER — CEFAZOLIN SODIUM 2 G/50ML
2 SOLUTION INTRAVENOUS
Status: CANCELLED | OUTPATIENT
Start: 2022-12-12

## 2022-12-12 RX ADMIN — IOPAMIDOL 85 ML: 755 INJECTION, SOLUTION INTRAVASCULAR at 12:20

## 2022-12-12 ASSESSMENT — PAIN SCALES - GENERAL
PAINLEVEL: NO PAIN (0)
PAINLEVEL: NO PAIN (0)

## 2022-12-12 NOTE — PROGRESS NOTES
[facility] Cardiology   VAD Clinic      HPI:   Mr. Sands is a 62 year old male with medical history pertinent for SLE, antiphospholipid syndrome, pulmonary embolism (on warfarin), ICM (EF 10-15%) s/p HM3 LVAD (7/28/22), and C. Diff. He presents to VAD clinic for routine follow up.     With regards to his recent conditions, Mr. Sands was admitted to [facility] 10/27-11/3/22 for drive line infection. CT CAP notable for persistent mediastinal mass with extensive inflammation around the drive line. He was evaluated by CVTS with no indication for I&D. He was started on broad spectrum antibiotics, which were later narrowed to vancomycin for Corynebacterium wound infection.  Several days later, Mr. Sands presented to [facility] on 11/6/22 with fever, chills, and SOB. A TTE was unremarkable. No fevers were documented during his stay, but his blood cultures showed 1/2 actinomyces. Repeat axial imaging showed no changes from prior, and a PET/CT did show preference for drive line infection without fluid collection that could be drained. He was sent home on IV ceftriaxone and daptomycin. At follow up with ID on 11/16 he reported no side effects from the medications, scant drainage from driveline, no fevers, chills, or diarrhea. Ceftraixone and dapto were stopped and he was started on minocycline.     Today, Mr. Sands presents to clinic with his daughter. He reports feeling relatively well. Chief complaint is persistent fatigue. He tells me that he takes one or two 30 minute naps daily, but then has difficulty sleeping well at night. Other notable issues that Mr. Sands reports include a brief nose bleed that occurred last week and a trip/fall that occurred 2 weeks ago. With regards to the epistaxis, Mr. Sands reports that it lasted ~ 5 minutes and stopped with pressure. Denies any other acute bleeding episodes.  With regards to the fall, Mr. Sands tells me that he tripped over a dog bed and fell onto his right side. Fall was not  associated with dizziness or lightheadedness. He bruised his right cheek. Denies any injury to his head. He did not lose consciousness. He also did not notify his LVAD coordinator of this event.     No other acute concerns. Denies chest pain/pressure, CASEY, SOB, palpitations, orthopnea, PND, LE edema, abdominal distension, syncope, or presyncope. Sometimes experiences lightheadedness with position changes. Denies fever, chills, cough, sore throat, nausea, vomiting. Occasional constipation. Denies melena, hematochezia, hematuria. Thinks he is having some vision changes with L>R blurry vision. He was told he has cataracts in his left eye. It has been > 1 year since he has had an eye exam. Denies headaches. No stroke symptoms.     Presently changing his LVAD dressing 2-3 x per week ( he should be doing it daily). Experiences intermittent pain at drive line site when he bends over. Sometimes has small amount of yellow drainage on dressing which he tells me is not new. No LVAD alarms.     Attends cardiac rehab 2 x weekly. Exercises on the bike for 30 minutes. Reports that MAPs at rehab are typically 80-85.   Appetite is good, although high in salt and does not consume veggies or fruits.  Reports eating a breakfast sandwich for breakfast, a sandwich for lunch, and cheeseburger for dinner. Snacks on chips, pickles, lunch meat.     VAD interrogation 12/12/22: VAD interrogation reviewed with VAD coordinator. Agree with findings. Occasional PI events, no power spikes, speed drops, or other findings suspicious of pump malfunction noted. Highest PIs in the 8s. Lowest flows in the 3s.      Cardiac Medications:  - Hydralazine 25 mg TID  - Warfarin  - ASA 81 mg daily   - Lisinopril 10 mg daily  - Digoxin 125 mcg every day  - Atorvastatin 40 mg nightly      PAST MEDICAL HISTORY:  Past Medical History:   Diagnosis Date     Antiphospholipid antibody syndrome (H)      CAD (coronary artery disease)      Chronic systolic heart failure  (H)      Ischemic cardiomyopathy      Mitral regurgitation      Systemic lupus erythematosus (H)        FAMILY HISTORY:  No family history on file.    SOCIAL HISTORY:  Social History     Socioeconomic History     Marital status: Single   Tobacco Use     Smoking status: Former     Smokeless tobacco: Never       CURRENT MEDICATIONS:  acetaminophen (TYLENOL) 325 MG tablet, Take 3 tablets (975 mg) by mouth every 8 hours as needed for mild pain  amoxicillin (AMOXIL) 500 MG capsule, Take 4 capsules (2,000 mg) by mouth as needed (take 1 hour prior to any dental procedures)  aspirin (ASA) 81 MG EC tablet, Take 1 tablet (81 mg) by mouth daily  atorvastatin (LIPITOR) 40 MG tablet, Take 1 tablet (40 mg) by mouth daily  digoxin (LANOXIN) 125 MCG tablet, Take 1 tablet (125 mcg) by mouth daily  famotidine (PEPCID) 20 MG tablet, Take 1 tablet (20 mg) by mouth 2 times daily  furosemide (LASIX) 20 MG tablet, Take 1 tablet (20 mg) by mouth daily as needed (For weight gain or shortness of breath)  hydrALAZINE (APRESOLINE) 25 MG tablet, Take 1 tablet (25 mg) by mouth 3 times daily  hydrocortisone 1 % CREA cream, Place 1 g rectally daily as needed for itching  hydroxychloroquine (PLAQUENIL) 200 MG tablet, Take 1 tablet (200 mg) by mouth 2 times daily  lisinopril (ZESTRIL) 10 MG tablet, Take 1 tablet (10 mg) by mouth daily  loperamide (IMODIUM) 2 MG capsule, Take 1 capsule (2 mg) by mouth 2 times daily as needed for diarrhea  melatonin 5 MG tablet, Take 1 tablet (5 mg) by mouth At Bedtime  methylcellulose (CITRUCEL) 500 MG TABS tablet, Take 2 tablets (1,000 mg) by mouth 2 times daily  multivitamin w/minerals (THERA-VIT-M) tablet, Take 1 tablet by mouth daily  pramox-pe-glycerin-petrolatum (PREPARATION H) 1-0.25-14.4-15 % CREA cream, Place rectally 2 times daily as needed for hemorrhoids Apply immediately after a bowel movement.  saline nasal (AYR SALINE) GEL topical gel, Apply 1 applicator into each nare nightly as needed for  congestion  tamsulosin (FLOMAX) 0.4 MG capsule, Take 1 capsule (0.4 mg) by mouth daily  warfarin ANTICOAGULANT (COUMADIN) 5 MG tablet, Take 1 tablet (5 mg) by mouth daily for 92 days  [DISCONTINUED] clopidogrel (PLAVIX) 75 MG tablet, Take 1 tablet (75 mg) by mouth daily  [DISCONTINUED] DULoxetine (CYMBALTA) 30 MG capsule, Take 1 capsule (30 mg) by mouth daily  [DISCONTINUED] mycophenolate (GENERIC EQUIVALENT) 500 MG tablet, Take 3 tablets (1,500 mg) by mouth 2 times daily for 30 days  [DISCONTINUED] ticagrelor (BRILINTA) 90 MG tablet, Take 1 tablet (90 mg) by mouth 2 times daily    No current facility-administered medications on file prior to visit.      ROS:   Refer to HPI    EXAM:  There were no vitals taken for this visit.  GENERAL: Appears comfortable, in no acute distress.   HEENT: Eye symmetrical, no discharge or icterus bilaterally. Mucous membranes moist and without lesions.  CV: RRR, + mechanical LVAD hum, JVD at clavicle sitting upright.   RESPIRATORY: Respirations regular, even, and unlabored. Lungs CTA throughout.   GI: Soft and non distended with normoactive bowel sounds present in all quadrants. No tenderness, rebound, guarding. No hepatomegaly.   EXTREMITIES: no peripheral edema. 2+ bilateral pedal pulses.   NEUROLOGIC: Alert and oriented x 3. No focal deficits.   MUSCULOSKELETAL: No joint swelling or tenderness.   SKIN: No jaundice. No rashes or lesions.     Labs, reviewed with patient in clinic today:  CBC RESULTS:  Lab Results   Component Value Date    WBC 7.6 11/03/2022    RBC 3.05 (L) 11/03/2022    HGB 8.6 (L) 11/03/2022    HCT 27.5 (L) 11/03/2022    MCV 90 11/03/2022    MCH 28.2 11/03/2022    MCHC 31.3 (L) 11/03/2022    RDW 15.8 (H) 11/03/2022     11/03/2022       CMP RESULTS:  Lab Results   Component Value Date     11/03/2022    POTASSIUM 3.8 11/03/2022    POTASSIUM 4.3 10/27/2022    POTASSIUM 3.9 09/01/2022    POTASSIUM 5.2 07/09/2022    CHLORIDE 108 (H) 11/03/2022    CHLORIDE 112  (H) 09/01/2022    CO2 22 11/03/2022    CO2 22 09/01/2022    ANIONGAP 9 11/03/2022    ANIONGAP 6 09/01/2022    GLC 96 11/03/2022     (H) 10/27/2022    GLC 94 09/01/2022    BUN 14.3 11/03/2022    BUN 13 09/01/2022    CR 0.70 11/03/2022    GFRESTIMATED >90 11/03/2022    ELDA 8.6 (L) 11/03/2022    BILITOTAL 0.3 11/03/2022    ALBUMIN 3.3 (L) 11/03/2022    ALBUMIN 3.1 (L) 09/01/2022    ALKPHOS 131 (H) 11/03/2022    ALT 12 11/03/2022    AST 20 11/03/2022        INR RESULTS:  Lab Results   Component Value Date    INR 1.7 (A) 12/06/2022       Lab Results   Component Value Date    MAG 1.7 11/02/2022     Lab Results   Component Value Date    NTBNPI 5,061 (H) 10/14/2022     No results found for: NTBNP    Cardiac Diagnostics:    12/12/22 CT A/P w/ contrast   IMPRESSION:   1. Very slight decrease in anterior mediastinal fluid collection of  unknown clinical significance.  No associated gas or significant  inflammation. Stable soft tissue thickening about the LVAD drive line.     2. Slight decrease probably mediastinal lymph nodes. Stable prominent  retroperitoneal and bilateral inguinal/external iliac chain lymph  nodes, likely reactive.  3. Stable trace pericardial effusion.  Chronic changes in the lungs as  above.  4. Hepatomegaly with heterogenous appearance, likely secondary to  congestive hepatopathy.  5. Small volume pelvic free fluid.  6. Cholelithiasis.    11/11/2022 CT/PET ([facility])  IMPRESSION:   1.  Increased FDG uptake along the subcutaneous course of the LVAD driveline suspicious for infection.   2.  Less intense FDG uptake along the LVAD outflow cannula is more likely to be due to aseptic reactive accumulation rather than infection.   3.  Moderately intense uptake along the midline sternotomy is likely due to ongoing osseous healing.   4.  Mildly hypermetabolic mediastinal and bilateral axillary lymph nodes are most likely inflammatory/reactive.   5.  Additional findings are similar to the comparison CT  "examinations.      11/6/2022 ECHO     Left Ventricle: The left ventricle appears normal in size. There is   mild left ventricular hypertrophy. Severely reduced left ventricular   systolic function. The EF is visually estimated to be 15-20%. HeartMate   III LVAD cannula visualized. The aortic valve opens intermittently during   the visualized cardiac cycles. The LVAD inflow cannula is well seated at   the apex.      Right Ventricle: The right ventricle is within the upper limits of   normal in size. Right ventricle was not well visualized. Systolic function   is mildly reduced.      IVC/SVC: Normal IVC size with minimal respirophasic changes.      Aortic Valve: There is mild regurgitation.      10/27/22 Device Interrogation   Device: Blooie [id number] Evera XT   Normal device function   Mode: AAI/DDD /115 bpm  AP: 0.1%  : 0.2%  Intrinsic rhythm: SR w/ PVCs @ 96 bpm  Thoracic Impedance: Below reference line, suggesting possible intrathoracic fluid accumulation.  Short V-V intervals: 0  Lead Trends: Acute change in RV capture threshold noted since VAD implant in July '22 and continues to remain high, measuring today at 2.75 V @ 1.0 ms. R waves today measuring at 0.9 mV. RV lead impedance appears stable at this time.  Estimated battery longevity to RRT = 4.5 years. Battery voltage = 2.96 V.   Atrial Arrhythmia: 2 AT/AF episodes lasting 19 hr 55 min - 36 hr 11 min, most recently occurring on 10/16/22. Stored EGMs show AT/AF with controlled vent response (80-90s bpm).   AF Remlap: 11.9% (since 10/7/22)  Anticoagulant: warfarin  Ventricular Arrhythmia: 1 VT-NS lasting 5 sec @ 186 bpm.   Setting Changes: RV Capture Management programmed from \"Adaptive\" to OFF in setting of increased RV amplitude, with current programmed RV Amplitude: 5V @ 1.0 ms.   Patient has an appointment to see Dr. Lora today.   Plan: Send a remote transmission in 3 months (upon transfer of home monitor from previous device clinic). " Follow-up with next available EP to address increase in RV amplitude following insertion of VAD.    KHANG Rodriguez RN    9/23/2022 ECHO  Interpretation Summary  HM3 LVAD is present but not well seen on today's study.     Left ventricular chamber size is small, LVIDd = 3.9 cm  Left ventricular function is decreased. The ejection fraction is 15-20%  (severely reduced).  Global right ventricular function is normal.  The aortic valve remains closed during the cardiac cycle. There is mild  continuous aortic regurgitation.  No pericardial effusion is present.     LV cavity size is smaller with cavity obliteration in standing position ,  LVIDd = 3.0 cm        Assessment and Plan:   Mr. Sands is a 62 year old male with medical history pertinent for SLE, antiphospholipid syndrome, pulmonary embolism (on warfarin), ICM (EF 10-15%) s/p HM3 LVAD (7/28/22), and C. Diff. He presents to VAD clinic for routine follow up.     Overall, feeling well. Appears euvolemic on exam. Remains on minocycline for drive line infection. He has been without fevers or chills. Currently changing dressing every 2-3 days. He is suppose to be doing daily dressing changes. This was reiterated.  Drive line dressing change performed in clinic by coordinator. Concerning for yellow/brown drainage. Additionally, drive line does not appear stable and is presently moving/pulling from exit site. Review of axial imaging from [place] [facility] admission on 11/6 showed no changes from prior, and a PET/CT done on 11/11 did show increased FDG uptake along the subcutaneous course of the LVAD driveline suspicious for infection, but without evidence of fluid collection. Given today's drive line findings, proceeded with wound cultures and CT A/P w/contrast. Notably his CRP 59 (19.5 on admit 11/6). CT imaging reviewed with Dr. Zamora. Opted to resume IV abx x 2 weeks. This will be setup with [facility] ID. After 2 weeks of IV abx, plan to repeat CT A/P and compare imaging to today's.  Dr. Zamora will determine if surgical intervention is needed at that time.     LVAD interrogation without alarms, however does have occasionally high PIs. Suspect this is secondary to hypertension. MAPs 85-90. Increased hydralazine to 37.5 mg TID. Discussed the importance of using caution with position changes and to sit/rest if he is feeling lightheaded or dizzy. Additionally, while he reports that his recent fall was due to tripping, we also discussed at length that any fall, especially if he bumps his head must be reported to his LVAD coordinator.   Review of today's labs are stable. Renal function and LFTs are wnl. Anemia improving.    In discussion of follow up care, Mr. Sands tells me that it is too challenging to return to for his follow up visit with Dr. Lora on 1/12/23 due to distance and winter weather. We discussed that close follow up is essential. Will plan to have him follow up with [facility] in 2-4 weeks with labs prior.  He will also have [facility] infectious disease follow up on 12/29/22.  team on       # Chronic systolic heart failure secondary to ICM   # s/p HM3 LVAD on 7/8/12  c/b RV failure with temporary RVAD support with Protek duo (removed 7/21) and post chest closure hemopericardium s/p repeat washout on 7/12, epistaixis requiring reintubation for airway protection.  Stage D. NYHA Class III- confounded by recent surgery     Fluid status euvolemic off diuretics, Rx for lasix 20 mg daily PRN  ACEi/ARB: lisinopril 10 mg daily and hydralazine 37.5 mg TID, will work to transition him to increased dose of lisinopril and off hydralazine, but awaiting repeat renal function  BB Deferred give recent LVAD implant with RV failure requiring protek   Aldosterone antagonist deferred while other medical therapy is prioritized and given recent hypovolemia  SGLT2i- defered given patient immunosuppression and unclear evidence in LVAD patients  RV support digoxin 125 mcg daily   SCD prophylaxis  ICD  MAP: Goal MAP 65-85, current MAPs 85-90 with high PIs  LDH trends: 230-300  Anticoagulation: warfarin INR goal 2-3, today 1.8  Antiplatelet: ASA 81 mg daily    #Drive line infection  Recent admission 10/27-11/3/22 for drive line infection. CT CAP notable for persistent mediastinal mass with extensive inflammation around the drive line. He was evaluated by CVTS with no indication for I&D. He was started on broad spectrum antibiotics, which were later narrowed to vancomycin for Corynebacterium wound infection. Readmitted to [facility] 11/6-11/14/22 for ongoing fever, chills, SOB. Blood cultures 1/2 grew Actinomyces bacteremia; questionable from PICC. PICC was removed and he was discharged home on daptomycin and IV ceftriaxone. At follow up with [facility] ID (Dr. Wilson) on 12/1 he reported no side effects from the medications, scant drainage from driveline, no fevers, chills, or diarrhea. Ceftraixone and dapto were stopped and he was started on minocycline.   - wound cultures and CT A/P w/contrast today--> CT A/P reviewed with Dr. Zamora who recommended resuming IV abx x 2 weeks-this will be ordered and managed by  ID  - Repeat CT A/P in 2 weeks following completion of IV abx  - continue minocycline 100 mg BID  - follow up with Dr. Wilson 12/29/22    #Epistaxis; stable  Single, brief episode of epistasis this past week. Resolved with pressure after 5 minutes.   - Continue nasal saline    #CAD s/p PCI to LAD ('05)  - continue ASA and atorvastatin       Chart review time today: 15 minutes  Visit time today: 45 minutes  Total time spent today: 60 minutes        Shraddha Menjivar DNP, NP-C  Advance Heart Failure  12/11/22          ADRIÁN GARCIA

## 2022-12-12 NOTE — PATIENT INSTRUCTIONS
Medications:  INCREASE Hydralazine to 37.5mg (1.5 tabs) three times a day    Instructions:   Your driveline looks concerning to our team, so we need to evaluate a few things before returning home.  Please return to the lab for a few more tests  Please complete your CT scan  Do not leave until you get the go ahead, from [facility], or one of the other coordinators  In the future, it's not a bad idea to bring your meds and battery charger with you in case you need to stay longer than anticipated.  [facility] will arrange with [facility] ID to restart IV antibiotics  You will need a repeat chest CT in 2 weeks.  Dr. Zamora will compare the CT's to see if there needs to be surgical intervention.    Follow-up: (make these appointments before you leave)  Please follow-up with [facility] in 2-4 weeks with labs prior.  You may need to see the [facility] team once more before returning to the [facility].  Please keep follow up as scheduled with the [facility] infectious disease team on December 29th @10:45.  Please follow-up with Dr. Lora in early March with labs prior, as scheduled.      Page the VAD Coordinator on call if you gain more than 3 lb in a day or 5 in a week. Please also page if you feel unwell or have alarms.   Great to see you in clinic today. To Page the VAD Coordinator on call, dial [phone] option #[phone] and ask to speak to the VAD coordinator on call.

## 2022-12-12 NOTE — LETTER
12/12/2022      RE: Dandy Sands  [address]  [address] [address]       HPI:   Mr. Sands is a 62 year old male with medical history pertinent for SLE, antiphospholipid syndrome, pulmonary embolism (on warfarin), ICM (EF 10-15%) s/p HM3 LVAD (7/28/22), s/p MDT CRT-D placement on 4/12/2018, and C. Diff.  He was referred for an EP evaluation due to ICD lead failure.  His ICD lead was found to have a sensing issue (low R wave amplitude) after the LVAD placement.    He denied any chest pain, SOB, dizziness or palpitation.    PAST MEDICAL HISTORY:  Past Medical History:   Diagnosis Date     Antiphospholipid antibody syndrome (H)      CAD (coronary artery disease)      Chronic systolic heart failure (H)      Ischemic cardiomyopathy      Mitral regurgitation      Systemic lupus erythematosus (H)        CURRENT MEDICATIONS:  Current Outpatient Medications   Medication Sig Dispense Refill     acetaminophen (TYLENOL) 325 MG tablet Take 3 tablets (975 mg) by mouth every 8 hours as needed for mild pain 270 tablet 0     amoxicillin (AMOXIL) 500 MG capsule Take 4 capsules (2,000 mg) by mouth as needed (take 1 hour prior to any dental procedures) 4 capsule 3     aspirin (ASA) 81 MG EC tablet Take 1 tablet (81 mg) by mouth daily       atorvastatin (LIPITOR) 40 MG tablet Take 1 tablet (40 mg) by mouth daily 30 tablet 0     digoxin (LANOXIN) 125 MCG tablet Take 1 tablet (125 mcg) by mouth daily 90 tablet 3     famotidine (PEPCID) 20 MG tablet Take 1 tablet (20 mg) by mouth 2 times daily 60 tablet 11     furosemide (LASIX) 20 MG tablet Take 1 tablet (20 mg) by mouth daily as needed (For weight gain or shortness of breath) 30 tablet 0     hydrALAZINE (APRESOLINE) 25 MG tablet Take 1 tablet (25 mg) by mouth 3 times daily 270 tablet 3     hydrocortisone 1 % CREA cream Place 1 g rectally daily as needed for itching 1 g 0     hydroxychloroquine (PLAQUENIL) 200 MG tablet Take 1 tablet (200 mg) by mouth 2 times daily 60  tablet 4     lisinopril (ZESTRIL) 10 MG tablet Take 1 tablet (10 mg) by mouth daily 30 tablet 0     loperamide (IMODIUM) 2 MG capsule Take 1 capsule (2 mg) by mouth 2 times daily as needed for diarrhea 30 capsule 0     melatonin 5 MG tablet Take 1 tablet (5 mg) by mouth At Bedtime 30 tablet 0     methylcellulose (CITRUCEL) 500 MG TABS tablet Take 2 tablets (1,000 mg) by mouth 2 times daily 60 tablet 0     multivitamin w/minerals (THERA-VIT-M) tablet Take 1 tablet by mouth daily  0     pramox-pe-glycerin-petrolatum (PREPARATION H) 1-0.25-14.4-15 % CREA cream Place rectally 2 times daily as needed for hemorrhoids Apply immediately after a bowel movement. 51 g 0     saline nasal (AYR SALINE) GEL topical gel Apply 1 applicator into each nare nightly as needed for congestion       tamsulosin (FLOMAX) 0.4 MG capsule Take 1 capsule (0.4 mg) by mouth daily 30 capsule 0     warfarin ANTICOAGULANT (COUMADIN) 5 MG tablet Take 1 tablet (5 mg) by mouth daily for 92 days 90 tablet 0       PAST SURGICAL HISTORY:  Past Surgical History:   Procedure Laterality Date     COLONOSCOPY N/A 05/23/2022    Procedure: COLONOSCOPY;  Surgeon: Parth Mneeses MD;  Location: [facility] OR     CV CORONARY ANGIOGRAM N/A 05/24/2022    Procedure: Coronary Angiogram;  Surgeon: Mike Pope MD;  Location:  HE[facility] CARDIAC CATH LAB     CV CORONARY ANGIOGRAM N/A 05/29/2022    Procedure: Coronary Angiogram;  Surgeon: Austin Bright MD;  Location: [facility] CARDIAC CATH LAB     CV CORONARY LITHOTRIPSY PCI Bilateral 05/24/2022    Procedure: Percutaneous Coronary Intervention - Lithotripsy;  Surgeon: Mike Pope MD;  Location: [facility] CARDIAC CATH LAB     CV INTRA AORTIC BALLOON N/A 06/17/2022    Procedure: Intraprocedure Aortic Balloon Pump Insertion;  Surgeon: Sherman Cerda MD;  Location:  HEART CARDIAC CATH LAB     CV INTRA AORTIC BALLOON Right 07/03/2022    Procedure: Reposition of Subclavian Intraprocedure Aortic Balloon  Pump;  Surgeon: Austin Bright MD;  Location:  HEART CARDIAC CATH LAB     CV INTRAVASULAR ULTRASOUND N/A 05/24/2022    Procedure: Intravascular Ultrasound;  Surgeon: Mike Pope MD;  Location:  HEART CARDIAC CATH LAB     CV PCI ANGIOPLASTY N/A 05/29/2022    Procedure: Percutaneous Transluminal Angioplasty;  Surgeon: Austin Bright MD;  Location:  HE[facility] CARDIAC CATH LAB     CV PCI STENT DRUG ELUTING N/A 05/24/2022    Procedure: Percutaneous Coronary Intervention Stent;  Surgeon: Mike Pope MD;  Location:  HEART CARDIAC CATH LAB     CV RIGHT HEART CATH MEASUREMENTS RECORDED N/A 05/18/2022    Procedure: Right Heart Catheterization;  Surgeon: Justo Stewart MD;  Location:  HEART CARDIAC CATH LAB     CV RIGHT HEART CATH MEASUREMENTS RECORDED N/A 05/24/2022    Procedure: Right Heart Catheterization;  Surgeon: Mike Pope MD;  Location:  HEART CARDIAC CATH LAB     CV RIGHT HEART CATH MEASUREMENTS RECORDED N/A 05/29/2022    Procedure: Right Heart Catheterization;  Surgeon: Austin Bright MD;  Location:  HE[facility] CARDIAC CATH LAB     CV RIGHT HEART CATH MEASUREMENTS RECORDED N/A 07/01/2022    Procedure: Right Heart Catheterization;  Surgeon: Mike Pope MD;  Location:  HEART CARDIAC CATH LAB     CV RIGHT HEART CATH MEASUREMENTS RECORDED N/A 09/23/2022    Procedure: Right Heart Catheterization;  Surgeon: Justo Stewart MD;  Location:  HEART CARDIAC CATH LAB     CV RIGHT HEART EXERCISE STRESS STUDY N/A 05/18/2022    Procedure: Stress Drug Study;  Surgeon: Justo Stewart MD;  Location:  HEART CARDIAC CATH LAB     CV [place]O PROCEDURE N/A 06/17/2022    Procedure: [place] Procedure;  Surgeon: Sherman Cerda MD;  Location:  HEART CARDIAC CATH LAB     INCISION AND DRAINAGE CHEST WASHOUT, COMBINED N/A 07/11/2022    Procedure: Chest washout and closure;  Surgeon: Darnell Morgan MD;  Location:  OR     INCISION AND DRAINAGE CHEST WASHOUT, COMBINED N/A 07/12/2022     Procedure: INCISION AND DRAINAGE, WOUND, CHEST, WITH IRRIGATION;  Surgeon: Darius Zamora MD;  Location: [facility] OR     INSERT ARTERIAL LINE  07/18/2022          INSERT INTRAAORTIC BALLOON PUMP Right 06/23/2022    Procedure: INSERTION, INTRA-AORTIC BALLOON PUMP RIGHT SUBCLAVIAN ARTERY.;  Surgeon: Hayden Veras MD;  Location: [facility] OR     INSERT VENTRICULAR ASSIST DEVICE LEFT (HEARTMATE II/III) MINIMALLY INVASIVE N/A 07/08/2022    Procedure: MEDIAN STERNOTOMY.  CARDIOPULMONARY BYPASS.  INTRAOPERATIVE TRANSESOPHAGEAL ECHOCARDIOGRAM.  IMPLANT LEFT VENTRICULAR ASSIST DEVICE HEARTMATE III.  PLACEMENT OF PERCUTANEOUS RIGHT VENTRICULAR ASSIST DEVICE.  TEMPORARY CHEST CLOSURE;  Surgeon: Darius Zamora MD;  Location: [facility] OR     IR CHEST TUBE PLACEMENT NON-TUNNELED RIGHT  08/01/2022     IRRIGATION AND DEBRIDEMENT CHEST WASHOUT, COMBINED N/A 07/13/2022    Procedure: IRRIGATION AND DEBRIDEMENT, CHEST;  Surgeon: Darius Zamora MD;  Location: [facility] OR     MIDLINE DOUBLE LUMEN PLACEMENT Left 09/11/2022    Mid line ok to use     MIDLINE DOUBLE LUMEN PLACEMENT Right 09/14/2022    5FR DL midline.     PICC DOUBLE LUMEN PLACEMENT Right 05/28/2022    right basilic 5 fr dl picc 40 cm     PICC DOUBLE LUMEN PLACEMENT Right 08/15/2022    Right Lateral, 41 total length, 3 cm external length     PICC SINGLE LUMEN PLACEMENT Right 11/01/2022    41cm (2cm external), Lateral brachial vein, low SVC       ALLERGIES:   No Known Allergies    FAMILY HISTORY:  - Premature coronary artery disease  - Atrial fibrillation  - Sudden cardiac death     SOCIAL HISTORY:  Social History     Tobacco Use     Smoking status: Former     Smokeless tobacco: Never       ROS:   Constitutional: No fever, chills, or sweats. Weight stable.   ENT: No visual disturbance, ear ache, epistaxis, sore throat.   Cardiovascular: As per HPI.   Respiratory: No cough, hemoptysis.    GI: No nausea, vomiting, hematemesis, melena, or hematochezia.   : No hematuria.  "  Integument: Negative.   Psychiatric: Negative.   Hematologic:  Easy bruising, no easy bleeding.  Neuro: Negative.   Endocrinology: No significant heat or cold intolerance   Musculoskeletal: No myalgia.    Exam:  BP (!) 90/0 (BP Location: Right arm, Patient Position: Sitting, Cuff Size: Adult Small)   Pulse 99   Ht 1.705 m (5' 7.13\")   Wt 68.7 kg (151 lb 8 oz)   BMI 23.64 kg/m    GENERAL APPEARANCE: healthy, alert and no distress  HEENT: no icterus, no xanthelasmas, normal pupil size and reaction, normal palate, mucosa moist, no central cyanosis  NECK: no adenopathy, no asymmetry, masses, or scars, thyroid normal to palpation and no bruits, JVP not elevated  RESPIRATORY: lungs clear to auscultation - no rales, rhonchi or wheezes, no use of accessory muscles, no retractions, respirations are unlabored, normal respiratory rate  CARDIOVASCULAR: regular rhythm, normal S1 with physiologic split S2, no S3 or S4 and no murmur, click or rub, precordium quiet with normal PMI.  ABDOMEN: soft, non tender, without hepatosplenomegaly, no masses palpable, bowel sounds normal, aorta not enlarged by palpation, no abdominal bruits  EXTREMITIES: peripheral pulses normal, no edema, no bruits  NEURO: alert and oriented to person/place/time, normal speech, gait and affect  VASC: Radial, femoral, dorsalis pedis and posterior tibialis pulses are normal in volumes and symmetric bilaterally. No bruits are heard.  SKIN: no ecchymoses, no rashes    Labs:  CBC RESULTS:   Lab Results   Component Value Date    WBC 7.8 12/12/2022    RBC 3.69 (L) 12/12/2022    HGB 10.6 (L) 12/12/2022    HCT 33.8 (L) 12/12/2022    MCV 92 12/12/2022    MCH 28.7 12/12/2022    MCHC 31.4 (L) 12/12/2022    RDW 16.9 (H) 12/12/2022     12/12/2022       BMP RESULTS:  Lab Results   Component Value Date     11/03/2022    POTASSIUM 3.8 11/03/2022    POTASSIUM 4.3 10/27/2022    POTASSIUM 3.9 09/01/2022    POTASSIUM 5.2 07/09/2022    CHLORIDE 108 (H) " "11/03/2022    CHLORIDE 112 (H) 09/01/2022    CO2 22 11/03/2022    CO2 22 09/01/2022    ANIONGAP 9 11/03/2022    ANIONGAP 6 09/01/2022    GLC 96 11/03/2022     (H) 10/27/2022    GLC 94 09/01/2022    BUN 14.3 11/03/2022    BUN 13 09/01/2022    CR 0.70 11/03/2022    GFRESTIMATED >90 11/03/2022    ELDA 8.6 (L) 11/03/2022        INR RESULTS:  Lab Results   Component Value Date    INR 1.83 (H) 12/12/2022    INR 1.7 (A) 12/06/2022    INR 2.23 (A) 12/01/2022    INR 1.88 (A) 11/28/2022    INR 1.88 (A) 11/22/2022       Procedures:  CXR on 10/27/2022: Reviewed.      ICD interrogation on 10/27/2022: Reviewed.  [place] [facility] [place] and iterative programming of their ICD per MD orders. Patient was seen in clinic today for reset of audible alert tone reported by patient/family. Alert tone sounding for \"unsuccessful [facility] Alert Transmission\" of \"High RV Threshold\" on 10/27/22.      Device: Medtronic [id number] Evera XT DR  Normal device function   Mode: AAI/DDD /115 bpm  AP: 0.1%  : 0.2%  Intrinsic rhythm: SR w/ PVCs @ 96 bpm  Thoracic Impedance: Below reference line, suggesting possible intrathoracic fluid accumulation.  Short V-V intervals: 0  Lead Trends: Acute change in RV capture threshold noted since VAD implant in July '22 and continues to remain high, measuring today at 2.75 V @ 1.0 ms. R waves today measuring at 0.9 mV. RV lead impedance appears stable at this time.  Estimated battery longevity to RRT = 4.5 years. Battery voltage = 2.96 V.   Atrial Arrhythmia: 2 AT/AF episodes lasting 19 hr 55 min - 36 hr 11 min, most recently occurring on 10/16/22. Stored EGMs show AT/AF with controlled vent response (80-90s bpm).   AF [place]: 11.9% (since 10/7/22)  Anticoagulant: warfarin  Ventricular Arrhythmia: 1 VT-NS lasting 5 sec @ 186 bpm.   Setting Changes: RV Capture Management programmed from \"Adaptive\" to OFF in setting of increased RV amplitude, with current programmed RV Amplitude: 5V @ 1.0 ms. "   Patient has an appointment to see Dr. Lora today.   Plan: Send a remote transmission in 3 months (upon transfer of home monitor from previous device clinic). Follow-up with next available EP to address increase in RV amplitude following insertion of VAD.    KHANG Rodriguez RN     Dual lead ICD    ICD interrogation on 12/12/2022: Reviewed.  Patient seen in clinic for evaluation and iterative programming of their ICD per MD orders.      Device: Medtronic [id number] Evera XT   Normal device function  Mode: AAI>DDD  bpm  AP: <0.1%  : <0.1%  Intrinsic rhythm: NSR 94 bpm  Thoracic Impedance: Below the reference line suggesting possible intrathoracic fluid accumulation.  Patient has a Muzicalltronic [id number] lead.  Short V-V intervals: 1  RV lead trend appear stable.  Increase in RV threshold and decrease in R waves noted at time of LVAD implant (7/8/22). RV threshold measured 3 V @ 1 ms today. R waves measured 0.8 mV.   Estimated battery longevity to RRT = 4.2 years  Atrial Arrhythmia: 9 AT/AF episodes recorded for a total time of 35 seconds.  AF [place]: <0.1%  Anticoagulant: warfarin  Ventricular Arrhythmia: 0  Setting Changes: RV amplitude increased to 6 V  Patient has an appointment to see Dr. Maurice and Shraddha Menjivar NP today.   Plan: RTC in 3 months.  TRINA Amador RN     Dual lead ICD    Assessment and Plan:   # SLE  # Antiphospholipid syndrome  # C. Diff  # Pulmonary embolism (on warfarin)  # ICM (EF 10-15%) s/p HM3 LVAD (7/28/22)  # s/p MDT CRT-D placement on 4/12/2018.  His ICD lead was found to have a sensing issue (low R wave amplitude) after the LVAD placement.  The R wave amplitude recorded from the ICD lead is getting attenuated due to the myocardial damage from the LVAD placement.  Therefore, the patient requires a replacement of the ICD lead.  The nature, risks, benefits, alternatives and anticipated results of the procedure were explained to the patient. These risks include but are not limited to local  vascular damage, bleeding, tamponade and infection. After careful consideration the patient wished to proceed with the procedure. The patient was verbally consented. We will schedule the patient to have this done at his earliest convenience.     I spent a total of 40 min today to review the records, see the patient, and complete the documents.      Beckie Maurice MD    CC  Patient Care Team:  Austin Sierra as PCP - General  Chantal Brasher RN as VAD Coordinator  Kelly Lora MD as MD (Advanced Heart Failure and Transplant Cardiology)  Antonio Mae MD as MD (Otolaryngology)  James Sterling MD as Assigned Rheumatology Provider

## 2022-12-12 NOTE — PATIENT INSTRUCTIONS
It was a pleasure to see you in clinic today.  Please do not hesitate to call with any questions or concerns.  We look forward to seeing you in clinic at your next device check in 3 months.    Zofia Amador, RN, MS, CCRN  Electrophysiology Nurse Clinician  [facility]    During Business Hours Please Call:  [phone]  After Hours Please Call:  [phone] - select option #[phone] and ask for job code [id number]

## 2022-12-12 NOTE — PROGRESS NOTES
HPI:   Mr. Sands is a 62 year old male with medical history pertinent for SLE, antiphospholipid syndrome, pulmonary embolism (on warfarin), ICM (EF 10-15%) s/p HM3 LVAD (7/28/22), s/p MDT CRT-D placement on 4/12/2018, and C. Diff.  He was referred for an EP evaluation due to ICD lead failure.  His ICD lead was found to have a sensing issue (low R wave amplitude) after the LVAD placement.    He denied any chest pain, SOB, dizziness or palpitation.    PAST MEDICAL HISTORY:  Past Medical History:   Diagnosis Date     Antiphospholipid antibody syndrome (H)      CAD (coronary artery disease)      Chronic systolic heart failure (H)      Ischemic cardiomyopathy      Mitral regurgitation      Systemic lupus erythematosus (H)        CURRENT MEDICATIONS:  Current Outpatient Medications   Medication Sig Dispense Refill     acetaminophen (TYLENOL) 325 MG tablet Take 3 tablets (975 mg) by mouth every 8 hours as needed for mild pain 270 tablet 0     amoxicillin (AMOXIL) 500 MG capsule Take 4 capsules (2,000 mg) by mouth as needed (take 1 hour prior to any dental procedures) 4 capsule 3     aspirin (ASA) 81 MG EC tablet Take 1 tablet (81 mg) by mouth daily       atorvastatin (LIPITOR) 40 MG tablet Take 1 tablet (40 mg) by mouth daily 30 tablet 0     digoxin (LANOXIN) 125 MCG tablet Take 1 tablet (125 mcg) by mouth daily 90 tablet 3     famotidine (PEPCID) 20 MG tablet Take 1 tablet (20 mg) by mouth 2 times daily 60 tablet 11     furosemide (LASIX) 20 MG tablet Take 1 tablet (20 mg) by mouth daily as needed (For weight gain or shortness of breath) 30 tablet 0     hydrALAZINE (APRESOLINE) 25 MG tablet Take 1 tablet (25 mg) by mouth 3 times daily 270 tablet 3     hydrocortisone 1 % CREA cream Place 1 g rectally daily as needed for itching 1 g 0     hydroxychloroquine (PLAQUENIL) 200 MG tablet Take 1 tablet (200 mg) by mouth 2 times daily 60 tablet 4     lisinopril (ZESTRIL) 10 MG tablet Take 1 tablet (10 mg) by mouth daily 30 tablet  0     loperamide (IMODIUM) 2 MG capsule Take 1 capsule (2 mg) by mouth 2 times daily as needed for diarrhea 30 capsule 0     melatonin 5 MG tablet Take 1 tablet (5 mg) by mouth At Bedtime 30 tablet 0     methylcellulose (CITRUCEL) 500 MG TABS tablet Take 2 tablets (1,000 mg) by mouth 2 times daily 60 tablet 0     multivitamin w/minerals (THERA-VIT-M) tablet Take 1 tablet by mouth daily  0     pramox-pe-glycerin-petrolatum (PREPARATION H) 1-0.25-14.4-15 % CREA cream Place rectally 2 times daily as needed for hemorrhoids Apply immediately after a bowel movement. 51 g 0     saline nasal (AYR SALINE) GEL topical gel Apply 1 applicator into each nare nightly as needed for congestion       tamsulosin (FLOMAX) 0.4 MG capsule Take 1 capsule (0.4 mg) by mouth daily 30 capsule 0     warfarin ANTICOAGULANT (COUMADIN) 5 MG tablet Take 1 tablet (5 mg) by mouth daily for 92 days 90 tablet 0       PAST SURGICAL HISTORY:  Past Surgical History:   Procedure Laterality Date     COLONOSCOPY N/A 05/23/2022    Procedure: COLONOSCOPY;  Surgeon: Parth Meneses MD;  [facility]     CV CORONARY ANGIOGRAM N/A 05/24/2022    Procedure: Coronary Angiogram;  Surgeon: Mike Pope MD;  Location: [facility] CARDIAC CATH LAB     CV CORONARY ANGIOGRAM N/A 05/29/2022    Procedure: Coronary Angiogram;  Surgeon: Austin Bright MD;  Location: [facility] CARDIAC CATH LAB     CV CORONARY LITHOTRIPSY PCI Bilateral 05/24/2022    Procedure: Percutaneous Coronary Intervention - Lithotripsy;  Surgeon: Mike Pope MD;  Location: [facility] CARDIAC CATH LAB     CV INTRA AORTIC BALLOON N/A 06/17/2022    Procedure: Intraprocedure Aortic Balloon Pump Insertion;  Surgeon: Sherman Cerda MD;  Location: [facility] CARDIAC CATH LAB     CV INTRA AORTIC BALLOON Right 07/03/2022    Procedure: Reposition of Subclavian Intraprocedure Aortic Balloon Pump;  Surgeon: Austin Bright MD;  Location: [facility] CARDIAC CATH LAB     CV INTRAVASULAR  ULTRASOUND N/A 05/24/2022    Procedure: Intravascular Ultrasound;  Surgeon: Mike Pope MD;  [facility]     CV PCI ANGIOPLASTY N/A 05/29/2022    Procedure: Percutaneous Transluminal Angioplasty;  Surgeon: Austin Brigth MD;  [facility]     CV PCI STENT DRUG ELUTING N/A 05/24/2022    Procedure: Percutaneous Coronary Intervention Stent;  Surgeon: Mike Pope MD;  [facility]     CV RIGHT HEART CATH MEASUREMENTS RECORDED N/A 05/18/2022    Procedure: Right Heart Catheterization;  Surgeon: Justo Stewart MD;  [facility]     CV RIGHT HEART CATH MEASUREMENTS RECORDED N/A 05/24/2022    Procedure: Right Heart Catheterization;  Surgeon: Mike Pope MD;  [facility]     CV RIGHT HEART CATH MEASUREMENTS RECORDED N/A 05/29/2022    Procedure: Right Heart Catheterization;  Surgeon: Austin Bright MD;  [facility]     CV RIGHT HEART CATH MEASUREMENTS RECORDED N/A 07/01/2022    Procedure: Right Heart Catheterization;  Surgeon: Mike Pope MD;  Location:  [facility]     CV RIGHT HEART CATH MEASUREMENTS RECORDED N/A 09/23/2022    Procedure: Right Heart Catheterization;  Surgeon: Justo Stewart MD;  Location:  [facility] CARD[facility]AC CATH LAB     CV RIGHT HEART EXERCISE STRESS STUDY N/A 05/18/2022    Procedure: Stress Drug Study;  Surgeon: Justo Stewart MD;  Location:  [facility] CARD[facility]AC CATH LAB     CV [place] PROCEDURE N/A 06/17/2022    Procedure: [address] [place] Procedure;  Surgeon: Sherman Cerda MD;  Location:  [facility] CARDIAC CATH LAB     INCISION AND DRAINAGE CHEST WASHOUT, COMBINED N/A 07/11/2022    Procedure: Chest washout and closure;  Surgeon: Darnell Morgan MD;  [facility]     INCISION AND DRAINAGE CHEST WASHOUT, COMBINED N/A 07/12/2022    Procedure: INCISION AND DRAINAGE, WOUND, CHEST, WITH IRRIGATION;  Surgeon: Darius Zamora  MD Isidoro;  Location: [facility] OR     INSERT ARTERIAL LINE  07/18/2022          INSERT INTRAAORTIC BALLOON PUMP Right 06/23/2022    Procedure: INSERTION, INTRA-AORTIC BALLOON PUMP RIGHT SUBCLAVIAN ARTERY.;  Surgeon: Hayden Veras MD;  [facility]     INSERT VENTRICULAR ASSIST DEVICE LEFT (HEARTMATE II/III) MINIMALLY INVASIVE N/A 07/08/2022    Procedure: MEDIAN STERNOTOMY.  CARDIOPULMONARY BYPASS.  INTRAOPERATIVE TRANSESOPHAGEAL ECHOCARDIOGRAM.  IMPLANT LEFT VENTRICULAR ASSIST DEVICE HEARTMATE III.  PLACEMENT OF PERCUTANEOUS RIGHT VENTRICULAR ASSIST DEVICE.  TEMPORARY CHEST CLOSURE;  Surgeon: Darius Zamora MD;  [facility]     IR CHEST TUBE PLACEMENT NON-TUNNELED RIGHT  08/01/2022     IRRIGATION AND DEBRIDEMENT CHEST WASHOUT, COMBINED N/A 07/13/2022    Procedure: IRRIGATION AND DEBRIDEMENT, CHEST;  Surgeon: Darius Zamora MD;  [facility]     MIDLINE DOUBLE LUMEN PLACEMENT Left 09/11/2022    Mid line ok to use     MIDLINE DOUBLE LUMEN PLACEMENT Right 09/14/2022    5FR DL midline.     PICC DOUBLE LUMEN PLACEMENT Right 05/28/2022    right basilic 5 fr dl picc 40 cm     PICC DOUBLE LUMEN PLACEMENT Right 08/15/2022    Right Lateral, 41 total length, 3 cm external length     PICC SINGLE LUMEN PLACEMENT Right 11/01/2022    41cm (2cm external), Lateral brachial vein, low SVC       ALLERGIES:   No Known Allergies    FAMILY HISTORY:  - Premature coronary artery disease  - Atrial fibrillation  - Sudden cardiac death     SOCIAL HISTORY:  Social History     Tobacco Use     Smoking status: Former     Smokeless tobacco: Never       ROS:   Constitutional: No fever, chills, or sweats. Weight stable.   ENT: No visual disturbance, ear ache, epistaxis, sore throat.   Cardiovascular: As per HPI.   Respiratory: No cough, hemoptysis.    GI: No nausea, vomiting, hematemesis, melena, or hematochezia.   : No hematuria.   Integument: Negative.   Psychiatric: Negative.   Hematologic:  Easy bruising, no easy  "bleeding.  Neuro: Negative.   Endocrinology: No significant heat or cold intolerance   Musculoskeletal: No myalgia.    Exam:  BP (!) 90/0 (BP Location: Right arm, Patient Position: Sitting, Cuff Size: Adult Small)   Pulse 99   Ht 1.705 m (5' 7.13\")   Wt 68.7 kg (151 lb 8 oz)   BMI 23.64 kg/m    GENERAL APPEARANCE: healthy, alert and no distress  HEENT: no icterus, no xanthelasmas, normal pupil size and reaction, normal palate, mucosa moist, no central cyanosis  NECK: no adenopathy, no asymmetry, masses, or scars, thyroid normal to palpation and no bruits, JVP not elevated  RESPIRATORY: lungs clear to auscultation - no rales, rhonchi or wheezes, no use of accessory muscles, no retractions, respirations are unlabored, normal respiratory rate  CARDIOVASCULAR: regular rhythm, normal S1 with physiologic split S2, no S3 or S4 and no murmur, click or rub, precordium quiet with normal PMI.  ABDOMEN: soft, non tender, without hepatosplenomegaly, no masses palpable, bowel sounds normal, aorta not enlarged by palpation, no abdominal bruits  EXTREMITIES: peripheral pulses normal, no edema, no bruits  NEURO: alert and oriented to person/place/time, normal speech, gait and affect  VASC: Radial, femoral, dorsalis pedis and posterior tibialis pulses are normal in volumes and symmetric bilaterally. No bruits are heard.  SKIN: no ecchymoses, no rashes    Labs:  CBC RESULTS:   Lab Results   Component Value Date    WBC 7.8 12/12/2022    RBC 3.69 (L) 12/12/2022    HGB 10.6 (L) 12/12/2022    HCT 33.8 (L) 12/12/2022    MCV 92 12/12/2022    MCH 28.7 12/12/2022    MCHC 31.4 (L) 12/12/2022    RDW 16.9 (H) 12/12/2022     12/12/2022       BMP RESULTS:  Lab Results   Component Value Date     11/03/2022    POTASSIUM 3.8 11/03/2022    POTASSIUM 4.3 10/27/2022    POTASSIUM 3.9 09/01/2022    POTASSIUM 5.2 07/09/2022    CHLORIDE 108 (H) 11/03/2022    CHLORIDE 112 (H) 09/01/2022    CO2 22 11/03/2022    CO2 22 09/01/2022    ANIONGAP 9 " "11/03/2022    ANIONGAP 6 09/01/2022    GLC 96 11/03/2022     (H) 10/27/2022    GLC 94 09/01/2022    BUN 14.3 11/03/2022    BUN 13 09/01/2022    CR 0.70 11/03/2022    GFRESTIMATED >90 11/03/2022    ELDA 8.6 (L) 11/03/2022        INR RESULTS:  Lab Results   Component Value Date    INR 1.83 (H) 12/12/2022    INR 1.7 (A) 12/06/2022    INR 2.23 (A) 12/01/2022    INR 1.88 (A) 11/28/2022    INR 1.88 (A) 11/22/2022       Procedures:  CXR on 10/27/2022: Reviewed.      ICD interrogation on 10/27/2022: Reviewed.  [place] [facility] [place] of their ICD per MD orders. Patient was seen in clinic today for reset of audible alert tone reported by patient/family. Alert tone sounding for \"unsuccessful [facility] Alert Transmission\" of \"High RV Threshold\" on 10/27/22.      Device: Medtronic [id number] Evera XT DR  Normal device function   Mode: AAI/DDD /115 bpm  AP: 0.1%  : 0.2%  Intrinsic rhythm: SR w/ PVCs @ 96 bpm  Thoracic Impedance: Below reference line, suggesting possible intrathoracic fluid accumulation.  Short V-V intervals: 0  Lead Trends: Acute change in RV capture threshold noted since VAD implant in July '22 and continues to remain high, measuring today at 2.75 V @ 1.0 ms. R waves today measuring at 0.9 mV. RV lead impedance appears stable at this time.  Estimated battery longevity to RRT = 4.5 years. Battery voltage = 2.96 V.   Atrial Arrhythmia: 2 AT/AF episodes lasting 19 hr 55 min - 36 hr 11 min, most recently occurring on 10/16/22. Stored EGMs show AT/AF with controlled vent response (80-90s bpm).   AF [place]: 11.9% (since 10/7/22)  Anticoagulant: warfarin  Ventricular Arrhythmia: 1 VT-NS lasting 5 sec @ 186 bpm.   Setting Changes: RV Capture Management programmed from \"Adaptive\" to OFF in setting of increased RV amplitude, with current programmed RV Amplitude: 5V @ 1.0 ms.   Patient has an appointment to see Dr. Lora today.   Plan: Send a remote transmission in 3 " months (upon transfer of home monitor from previous device clinic). Follow-up with next available EP to address increase in RV amplitude following insertion of VAD.    KHANG Rodriguez RN     Dual lead ICD    ICD interrogation on 12/12/2022: Reviewed.  Patient seen in clinic for evaluation and iterative programming of their ICD per MD orders.      Device: Medtronic [id number] Evera XT   Normal device function  Mode: AAI>DDD  bpm  AP: <0.1%  : <0.1%  Intrinsic rhythm: NSR 94 bpm  Thoracic Impedance: Below the reference line suggesting possible intrathoracic fluid accumulation.  Patient has a Medtronic 6949 lead.  Short V-V intervals: 1  RV lead trend appear stable.  Increase in RV threshold and decrease in R waves noted at time of LVAD implant (7/8/22). RV threshold measured 3 V @ 1 ms today. R waves measured 0.8 mV.   Estimated battery longevity to RRT = 4.2 years  Atrial Arrhythmia: 9 AT/AF episodes recorded for a total time of 35 seconds.  AF [place]: <0.1%  Anticoagulant: warfarin  Ventricular Arrhythmia: 0  Setting Changes: RV amplitude increased to 6 V  Patient has an appointment to see Dr. Maurice and Shraddha Menjivar NP today.   Plan: RTC in 3 months.  TRINA Amador RN     Dual lead ICD    Assessment and Plan:   # SLE  # Antiphospholipid syndrome  # C. Diff  # Pulmonary embolism (on warfarin)  # ICM (EF 10-15%) s/p HM3 LVAD (7/28/22)  # s/p MDT CRT-D placement on 4/12/2018.  His ICD lead was found to have a sensing issue (low R wave amplitude) after the LVAD placement.  The R wave amplitude recorded from the ICD lead is getting attenuated due to the myocardial damage from the LVAD placement.  Therefore, the patient requires a replacement of the ICD lead.  The nature, risks, benefits, alternatives and anticipated results of the procedure were explained to the patient. These risks include but are not limited to local vascular damage, bleeding, tamponade and infection. After careful consideration the patient wished to  proceed with the procedure. The patient was verbally consented. We will schedule the patient to have this done at his earliest convenience.     I spent a total of 40 min today to review the records, see the patient, and complete the documents.    CC  Patient Care Team:  Austin Sierra as PCP - General  Chantal Brasher RN as VAD Coordinator  Kelly Ndiaye MD as MD (Advanced Heart Failure and Transplant Cardiology)  Antoino Mae MD as MD (Otolaryngology)  Kelly Ndiaye MD as Assigned Heart and Vascular Provider  James Sterling MD as Assigned Rheumatology Provider  KELLY NDIAYE

## 2022-12-12 NOTE — NURSING NOTE
12/12/22 0930   [facility] VAD Information   Implant LVAD   LVAD Pump HeartMate 3   Heartmate 3 LEFT VS   Flow (Lpm) 3.6 Lpm   Pulse Index (PI) 5.1 PI   Speed (rpm) 5100 rpm   Power (persaud) 3.6 persaud   Current Hct setting 34  (Updated)   Primary Controller   Serial number [id number]   EBB: Patient use 5   Replace in 27 Months   Backup Controller   Serial number [id number]   EBB: Patient use 8   Replace EBB in 27 Months   VAD Interrogation   Alarms reported by patient N   Unexpected alarms noted upon interrogation None   PI events Frequent   Damage to equipment is noted N   Action taken Reviewed proper equipment care and maintenance   Driveline Exit Site   Dressing change done Y   Driveline properly secured Yes   DLES assessment drainage;redness  (small granuloma noted)   Dressing used Daily kit   Frequency patient changes dressing   (Per pt Q 3-4 days)     4)  Education Complete: Yes   Charge the BACKUP controller s backup battery every 6 months  Perform a self test on BACKUP every 6 months  Change the MPU s batteries every 6 months:Yes  Have equipment serviced yearly (if applicable):Yes      Pt reports ongoing draingage, that has worsened slightly since transitioning to PO abx.  Cultures collected, labs sent and pt obtained CT scan.  Results all discussed with both Dr. Zamora and Shraddha MONTIEL NP.  Plan is for pt to transition back to IV abx for two weeks, & repeat a CT scan.  Dr. Zamora to compare scans to determine if further intervention is needed at that time.    Spoke with pt to inform him and Asha of updated plan.  They verbalized understanding of the instructions and plans discussed.    Writer called [facility] [facility] (nurses station @ [phone], main # [phone])  Discussed the plan to transition back to IV antibiotics for 2 weeks, with both RN and NP, to implement the change.  Gave pt's primary coordinator contact information to ID clinic to follow up.

## 2022-12-12 NOTE — PROGRESS NOTES
ANTICOAGULATION MANAGEMENT     Dandy Sands 62 year old male is on warfarin with subtherapeutic INR result. (Goal INR 2.0-3.0)    Recent labs: (last 7 days)     12/12/22  0854   INR 1.83*       ASSESSMENT       Source(s): Chart Review and Patient/Caregiver Call       Warfarin doses taken: Warfarin taken as instructed    Diet: No new diet changes identified    New illness, injury, or hospitalization: No    Medication/supplement changes: Minocycline 100mg BID     Signs or symptoms of bleeding or clotting: Yes: per office visit today-brief nose bleed occurred last week-lasted ~5 min-stopped with pressure    Previous INR: Subtherapeutic-maintenance dose increase recommended last encounter    Additional findings: Upcoming surgery/procedure Patient is scheduled for 4 teeth to be extracted on 12/20.  INR needs to be <2.5 for this procedure    Also requiring a replacement of his ICD lead in the near future-per EP instructions for this procedure-CONTINUE: Warfarin     PLAN     Recommended plan for ongoing change(s) affecting INR     Dosing Instructions: Increase your warfarin dose (5.9% change) with next INR in 1 week  -has CR on Tues, Thurs-would like to recheck labs one of those days    Summary  As of 12/12/2022    Full warfarin instructions:  5 mg every Sun, Tue, Thu; 7.5 mg all other days; Starting 12/12/2022   Next INR check:  12/20/2022             Telephone call with  sonAmos who verbalizes understanding and agrees to plan and who agrees to plan and repeated back plan correctly    Patient using outside facility for labs    Education provided:     Symptom monitoring: monitoring for bleeding signs and symptoms and monitoring for clotting signs and symptoms    Contact [phone] with any changes, questions or concerns.     Plan made per [facility] anticoagulation protocol and per LVAD protocol    LORENA MOSES RN  Anticoagulation Clinic  12/12/2022    _______________________________________________________________________      Anticoagulation Episode Summary     Current INR goal:  2.0-3.0   TTR:  65.8 % (2 mo)   Target end date:  Indefinite   Send INR reminders to:  ANTICOAG LVAD    Indications    Chronic systolic congestive heart failure (H) [I50.22]  LVAD (left ventricular assist device) present (H) [Z95.811]  Heart failure with reduced ejection fraction  NYHA class III (H) [I50.20]  Left ventricular assist device present (H) [Z95.811]           Comments:  Follow VAD Anticoag protocol:Yes: HeartMate 3   Bridging: No bridging epistaxis.   Date VAD placed: 7/8/22         Anticoagulation Care Providers     Provider Role Specialty Phone number    Kelly Lora MD Referring Cardiovascular Disease [phone]

## 2022-12-12 NOTE — PATIENT INSTRUCTIONS
You were seen in the Electrophysiology Clinic today by: Dr Maurice    Plan:     You are scheduled for a lead revision of your cardiac device.      Visitor Policy: Two visitors.    Below are instructions, if you have questions please contact our office.     1. You should arrive at the [facility]   ([address], [place]) to the [facility] Waiting Room at ___________ on   ______________.  2. Do not eat for 8 hours prior to arrival, you can drink water up until 2 hours prior.  3. If you are diabetic contact your diabetes team if you have questions  4. The morning of your procedure, you may take your scheduled medications with a sip of water - CONTINUE: Warfarin  5. You will discharge the same day. You will need a  and someone to stay with you for 24 hours.  6. Use the antibacterial wipes the night before and morning of your procedure. Follow the included instructions.       Post-Procedure Instructions  Wound Care  If no Dermabond has been applied to your incision: Keep your incision (surgery wound) dry for 3 days.  After 3 days, you may remove the outer bandage.  Keep the strips of tape on.  They will be removed at your clinic visit.  If Dermabond has been applied to your incision,  do not apply powder or lotion to the incision for 14 days. You may shower in the morning.  Check for signs of infection each day.  These include increased redness, swelling, drainage or a fever over 101 F (38.3 C).  Call us immediately if you see any of these signs.  If there are no signs of infection, you may shower in 3 days.  Do not submerge the incision (in a bath tub, hot tub, or swimming pool) until fully healed.  Pain  You may have mild to moderate pain for 3 to 5 days.  Take acetaminophen (Tylenol) or ibuprofen (Advil) for the pain.  Call us if the pain is severe or lasts more than 5 days.  Activity  You should slowly go back to your normal activities after 24 hours.  Healing will take 4 to 6  "weeks.  No driving for 3 days  Avoid climbing a ladder alone.  It is best to stay within 4 feet of the ground.  Avoid anything that may cause rough contact or a hard hit to your chest.  This includes football, hockey, and other contact sports.  Do not go swimming or boating alone.    FOR ATLEAST 4 WEEKS:  Do not raise your affected arm above your shoulder.  Do not use your affected arm to push, pull, or lift anything over 10 pounds.  Avoid repetitive upper body activities for 6 weeks (ie: golf, swimming, and weight lifting)        Follow Up Appointments Date & Time   7-10 day incision check with device clinic    3 month follow up visit with device check & provider          Preparing the Skin Before Surgery      Preparing or \"prepping\" skin before surgery can reduce the risk of infection at the surgical site. To make the process easier, this facility has chosen disposable cloths moistened with rinse-free 2% Chlorhexidine Gluconate antiseptic solution designed to reduce the bacteria on the skin. The steps below outline the prepping process and should be carefully followed.    Prep the skin at the following time(s):  - If you wish to shower, bathe or shampoo your hair, do so the night before and prep your skin afterwards for the first time using one package of cloths.  - Skin must be prepped the morning of the procedure using the second package of cloths.    Prep The Night Before Procedure  Do not allow this product to come in contact with your eyes, ears, mouth or mucous membranes.   Reach into one of the packages, remove the two cloths and place onto a clean table.  Use one clean cloth to prep each are of the body. One cloth for #1 - neck & chest. One cloth for #2 & #3 - both arms, shoulder to fingertips including armpits. Wipe each area in a back and forth motion for 3 minutes. Be sure to wipe each area thoroughly.  Do not rinse or apply any lotions, moisturizer or make up after prepping.  Discard cloths in trash " can.   Allow your skin to air dry. Dress in clean clothes/sleepwear    Prepping your skin on the morning of surgery:  Do not shower, bathe or shampoo hair.  Open a new package and follow the instructions listed above.                     If you have further questions, please utilize [employer] to contact us.     Your Care Team:    EP Cardiology   Telephone Number     Nurse Line  Dayana Reynaga, RN   Kym Tan RN   [phone]     For scheduling appointments:   Shanti   [phone]   For procedure scheduling:    Terra Tomas     [phone]   For the Device Clinic (Pacemakers, ICDs, Loop Recorders)    During business hours: [phone]  After business hours:   [phone]- select option 4 and ask for job code [phone].       On-call cardiologist for after hours or on weekends:   [phone], option #4, and ask to speak to the on-call cardiologist.     Cardiovascular Clinic:   [address] [place] [address]. [place] [address]      As always, Thank you for trusting us with your health care needs!

## 2022-12-12 NOTE — NURSING NOTE
Chief Complaint   Patient presents with     Follow Up     device note from 10/27/22 next available EP to address increase in RV amplitude following insertion of VAD.   *follows Dr Lora       Vitals were taken and medications were reconciled.    Raúl Delcid, EMT  9:39 AM

## 2022-12-12 NOTE — LETTER
12/12/2022      RE: Dandy Sands  [address]  [address] [address]       Dear Colleague,    Thank you for the opportunity to participate in the care of your patient, Dandy Sands, at the [facility]. Please see a copy of my visit note below.      [facility]   V[facility] Clinic      HPI:   Mr. Sands is a 62 year old male with medical history pertinent for SLE, antiphospholipid syndrome, pulmonary embolism (on warfarin), ICM (EF 10-15%) s/p HM3 LVAD (7/28/22), and C. Diff. He presents to VAD clinic for routine follow up.     With regards to his recent conditions, Mr. Sands was admitted to [facility] 10/27-11/3/22 for drive line infection. CT CAP notable for persistent mediastinal mass with extensive inflammation around the drive line. He was evaluated by CVTS with no indication for I&D. He was started on broad spectrum antibiotics, which were later narrowed to vancomycin for Corynebacterium wound infection.  Several days later, Mr. Sands presented to [facility] on 11/6/22 with fever, chills, and SOB. A TTE was unremarkable. No fevers were documented during his stay, but his blood cultures showed 1/2 actinomyces. Repeat axial imaging showed no changes from prior, and a PET/CT did show preference for drive line infection without fluid collection that could be drained. He was sent home on IV ceftriaxone and daptomycin. At follow up with ID on 11/16 he reported no side effects from the medications, scant drainage from driveline, no fevers, chills, or diarrhea. Ceftraixone and dapto were stopped and he was started on minocycline.     Today, Mr. Sands presents to clinic with his daughter. He reports feeling relatively well. Chief complaint is persistent fatigue. He tells me that he takes one or two 30 minute naps daily, but then has difficulty sleeping well at night. Other notable issues that Mr. Sands reports include a brief  nose bleed that occurred last week and a trip/fall that occurred 2 weeks ago. With regards to the epistaxis, Mr. Sands reports that it lasted ~ 5 minutes and stopped with pressure. Denies any other acute bleeding episodes.  With regards to the fall, Mr. Sands tells me that he tripped over a dog bed and fell onto his right side. Fall was not associated with dizziness or lightheadedness. He bruised his right cheek. Denies any injury to his head. He did not lose consciousness. He also did not notify his LVAD coordinator of this event.     No other acute concerns. Denies chest pain/pressure, CASEY, SOB, palpitations, orthopnea, PND, LE edema, abdominal distension, syncope, or presyncope. Sometimes experiences lightheadedness with position changes. Denies fever, chills, cough, sore throat, nausea, vomiting. Occasional constipation. Denies melena, hematochezia, hematuria. Thinks he is having some vision changes with L>R blurry vision. He was told he has cataracts in his left eye. It has been > 1 year since he has had an eye exam. Denies headaches. No stroke symptoms.     Presently changing his LVAD dressing 2-3 x per week ( he should be doing it daily). Experiences intermittent pain at drive line site when he bends over. Sometimes has small amount of yellow drainage on dressing which he tells me is not new. No LVAD alarms.     Attends cardiac rehab 2 x weekly. Exercises on the bike for 30 minutes. Reports that MAPs at rehab are typically 80-85.   Appetite is good, although high in salt and does not consume veggies or fruits.  Reports eating a breakfast sandwich for breakfast, a sandwich for lunch, and cheeseburger for dinner. Snacks on chips, pickles, lunch meat.     VAD interrogation 12/12/22: VAD interrogation reviewed with VAD coordinator. Agree with findings. Occasional PI events, no power spikes, speed drops, or other findings suspicious of pump malfunction noted. Highest PIs in the 8s. Lowest flows in the 3s.       Cardiac Medications:  - Hydralazine 25 mg TID  - Warfarin  - ASA 81 mg daily   - Lisinopril 10 mg daily  - Digoxin 125 mcg every day  - Atorvastatin 40 mg nightly      PAST MEDICAL HISTORY:  Past Medical History:   Diagnosis Date     Antiphospholipid antibody syndrome (H)      CAD (coronary artery disease)      Chronic systolic heart failure (H)      Ischemic cardiomyopathy      Mitral regurgitation      Systemic lupus erythematosus (H)        FAMILY HISTORY:  No family history on file.    SOCIAL HISTORY:  Social History     Socioeconomic History     Marital status: Single   Tobacco Use     Smoking status: Former     Smokeless tobacco: Never       CURRENT MEDICATIONS:  acetaminophen (TYLENOL) 325 MG tablet, Take 3 tablets (975 mg) by mouth every 8 hours as needed for mild pain  amoxicillin (AMOXIL) 500 MG capsule, Take 4 capsules (2,000 mg) by mouth as needed (take 1 hour prior to any dental procedures)  aspirin (ASA) 81 MG EC tablet, Take 1 tablet (81 mg) by mouth daily  atorvastatin (LIPITOR) 40 MG tablet, Take 1 tablet (40 mg) by mouth daily  digoxin (LANOXIN) 125 MCG tablet, Take 1 tablet (125 mcg) by mouth daily  famotidine (PEPCID) 20 MG tablet, Take 1 tablet (20 mg) by mouth 2 times daily  furosemide (LASIX) 20 MG tablet, Take 1 tablet (20 mg) by mouth daily as needed (For weight gain or shortness of breath)  hydrALAZINE (APRESOLINE) 25 MG tablet, Take 1 tablet (25 mg) by mouth 3 times daily  hydrocortisone 1 % CREA cream, Place 1 g rectally daily as needed for itching  hydroxychloroquine (PLAQUENIL) 200 MG tablet, Take 1 tablet (200 mg) by mouth 2 times daily  lisinopril (ZESTRIL) 10 MG tablet, Take 1 tablet (10 mg) by mouth daily  loperamide (IMODIUM) 2 MG capsule, Take 1 capsule (2 mg) by mouth 2 times daily as needed for diarrhea  melatonin 5 MG tablet, Take 1 tablet (5 mg) by mouth At Bedtime  methylcellulose (CITRUCEL) 500 MG TABS tablet, Take 2 tablets (1,000 mg) by mouth 2 times  daily  multivitamin w/minerals (THERA-VIT-M) tablet, Take 1 tablet by mouth daily  pramox-pe-glycerin-petrolatum (PREPARATION H) 1-0.25-14.4-15 % CREA cream, Place rectally 2 times daily as needed for hemorrhoids Apply immediately after a bowel movement.  saline nasal (AYR SALINE) GEL topical gel, Apply 1 applicator into each nare nightly as needed for congestion  tamsulosin (FLOMAX) 0.4 MG capsule, Take 1 capsule (0.4 mg) by mouth daily  warfarin ANTICOAGULANT (COUMADIN) 5 MG tablet, Take 1 tablet (5 mg) by mouth daily for 92 days  [DISCONTINUED] clopidogrel (PLAVIX) 75 MG tablet, Take 1 tablet (75 mg) by mouth daily  [DISCONTINUED] DULoxetine (CYMBALTA) 30 MG capsule, Take 1 capsule (30 mg) by mouth daily  [DISCONTINUED] mycophenolate (GENERIC EQUIVALENT) 500 MG tablet, Take 3 tablets (1,500 mg) by mouth 2 times daily for 30 days  [DISCONTINUED] ticagrelor (BRILINTA) 90 MG tablet, Take 1 tablet (90 mg) by mouth 2 times daily    No current facility-administered medications on file prior to visit.      ROS:   Refer to HPI    EXAM:  There were no vitals taken for this visit.  GENERAL: Appears comfortable, in no acute distress.   HEENT: Eye symmetrical, no discharge or icterus bilaterally. Mucous membranes moist and without lesions.  CV: RRR, + mechanical LVAD hum, JVD at clavicle sitting upright.   RESPIRATORY: Respirations regular, even, and unlabored. Lungs CTA throughout.   GI: Soft and non distended with normoactive bowel sounds present in all quadrants. No tenderness, rebound, guarding. No hepatomegaly.   EXTREMITIES: no peripheral edema. 2+ bilateral pedal pulses.   NEUROLOGIC: Alert and oriented x 3. No focal deficits.   MUSCULOSKELETAL: No joint swelling or tenderness.   SKIN: No jaundice. No rashes or lesions.     Labs, reviewed with patient in clinic today:  CBC RESULTS:  Lab Results   Component Value Date    WBC 7.6 11/03/2022    RBC 3.05 (L) 11/03/2022    HGB 8.6 (L) 11/03/2022    HCT 27.5 (L) 11/03/2022     MCV 90 11/03/2022    MCH 28.2 11/03/2022    MCHC 31.3 (L) 11/03/2022    RDW 15.8 (H) 11/03/2022     11/03/2022       CMP RESULTS:  Lab Results   Component Value Date     11/03/2022    POTASSIUM 3.8 11/03/2022    POTASSIUM 4.3 10/27/2022    POTASSIUM 3.9 09/01/2022    POTASSIUM 5.2 07/09/2022    CHLORIDE 108 (H) 11/03/2022    CHLORIDE 112 (H) 09/01/2022    CO2 22 11/03/2022    CO2 22 09/01/2022    ANIONGAP 9 11/03/2022    ANIONGAP 6 09/01/2022    GLC 96 11/03/2022     (H) 10/27/2022    GLC 94 09/01/2022    BUN 14.3 11/03/2022    BUN 13 09/01/2022    CR 0.70 11/03/2022    GFRESTIMATED >90 11/03/2022    ELDA 8.6 (L) 11/03/2022    BILITOTAL 0.3 11/03/2022    ALBUMIN 3.3 (L) 11/03/2022    ALBUMIN 3.1 (L) 09/01/2022    ALKPHOS 131 (H) 11/03/2022    ALT 12 11/03/2022    AST 20 11/03/2022        INR RESULTS:  Lab Results   Component Value Date    INR 1.7 (A) 12/06/2022       Lab Results   Component Value Date    MAG 1.7 11/02/2022     Lab Results   Component Value Date    NTBNPI 5,061 (H) 10/14/2022     No results found for: NTBNP    Cardiac Diagnostics:    12/12/22 CT A/P w/ contrast   IMPRESSION:   1. Very slight decrease in anterior mediastinal fluid collection of  unknown clinical significance.  No associated gas or significant  inflammation. Stable soft tissue thickening about the LVAD drive line.     2. Slight decrease probably mediastinal lymph nodes. Stable prominent  retroperitoneal and bilateral inguinal/external iliac chain lymph  nodes, likely reactive.  3. Stable trace pericardial effusion.  Chronic changes in the lungs as  above.  4. Hepatomegaly with heterogenous appearance, likely secondary to  congestive hepatopathy.  5. Small volume pelvic free fluid.  6. Cholelithiasis.    11/11/2022 CT/PET ([facility])  IMPRESSION:   1.  Increased FDG uptake along the subcutaneous course of the LVAD driveline suspicious for infection.   2.  Less intense FDG uptake along the LVAD outflow cannula  is more likely to be due to aseptic reactive accumulation rather than infection.   3.  Moderately intense uptake along the midline sternotomy is likely due to ongoing osseous healing.   4.  Mildly hypermetabolic mediastinal and bilateral axillary lymph nodes are most likely inflammatory/reactive.   5.  Additional findings are similar to the comparison CT examinations.      11/6/2022 ECHO     Left Ventricle: The left ventricle appears normal in size. There is   mild left ventricular hypertrophy. Severely reduced left ventricular   systolic function. The EF is visually estimated to be 15-20%. HeartMate   III LVAD cannula visualized. The aortic valve opens intermittently during   the visualized cardiac cycles. The LVAD inflow cannula is well seated at   the apex.      Right Ventricle: The right ventricle is within the upper limits of   normal in size. Right ventricle was not well visualized. Systolic function   is mildly reduced.      IVC/SVC: Normal IVC size with minimal respirophasic changes.      Aortic Valve: There is mild regurgitation.      10/27/22 Device Interrogation   Device: [employer] [id number] Evera XT DR  Normal device function   Mode: AAI/DDD /115 bpm  AP: 0.1%  : 0.2%  Intrinsic rhythm: SR w/ PVCs @ 96 bpm  Thoracic Impedance: Below reference line, suggesting possible intrathoracic fluid accumulation.  Short V-V intervals: 0  Lead Trends: Acute change in RV capture threshold noted since VAD implant in July '22 and continues to remain high, measuring today at 2.75 V @ 1.0 ms. R waves today measuring at 0.9 mV. RV lead impedance appears stable at this time.  Estimated battery longevity to RRT = 4.5 years. Battery voltage = 2.96 V.   Atrial Arrhythmia: 2 AT/AF episodes lasting 19 hr 55 min - 36 hr 11 min, most recently occurring on 10/16/22. Stored EGMs show AT/AF with controlled vent response (80-90s bpm).   AF [place]: 11.9% (since 10/7/22)  Anticoagulant: warfarin  Ventricular Arrhythmia: 1 VT-NS  "lasting 5 sec @ 186 bpm.   Setting Changes: RV Capture Management programmed from \"Adaptive\" to OFF in setting of increased RV amplitude, with current programmed RV Amplitude: 5V @ 1.0 ms.   Patient has an appointment to see Dr. Lora today.   Plan: Send a remote transmission in 3 months (upon transfer of home monitor from previous device clinic). Follow-up with next available EP to address increase in RV amplitude following insertion of VAD.    KHANG Rodriguez RN    9/23/2022 ECHO  Interpretation Summary  HM3 LVAD is present but not well seen on today's study.     Left ventricular chamber size is small, LVIDd = 3.9 cm  Left ventricular function is decreased. The ejection fraction is 15-20%  (severely reduced).  Global right ventricular function is normal.  The aortic valve remains closed during the cardiac cycle. There is mild  continuous aortic regurgitation.  No pericardial effusion is present.     LV cavity size is smaller with cavity obliteration in standing position ,  LVIDd = 3.0 cm        Assessment and Plan:   Mr. Sands is a 62 year old male with medical history pertinent for SLE, antiphospholipid syndrome, pulmonary embolism (on warfarin), ICM (EF 10-15%) s/p HM3 LVAD (7/28/22), and C. Diff. He presents to VAD clinic for routine follow up.     Overall, feeling well. Appears euvolemic on exam. Remains on minocycline for drive line infection. He has been without fevers or chills. Currently changing dressing every 2-3 days. He is suppose to be doing daily dressing changes. This was reiterated.  Drive line dressing change performed in clinic by coordinator. Concerning for yellow/brown drainage. Additionally, drive line does not appear stable and is presently moving/pulling from exit site. Review of axial imaging from [facility] admission on 11/6 showed no changes from prior, and a PET/CT done on 11/11 did show increased FDG uptake along the subcutaneous course of the LVAD driveline suspicious for infection, but " without evidence of fluid collection. Given today's drive line findings, proceeded with wound cultures and CT A/P w/contrast. Notably his CRP 59 (19.5 on admit 11/6). CT imaging reviewed with Dr. Zamora. Opted to resume IV abx x 2 weeks. This will be setup with [facility] HARDIN. After 2 weeks of IV abx, plan to repeat CT A/P and compare imaging to today's. Dr. Zamora will determine if surgical intervention is needed at that time.     LVAD interrogation without alarms, however does have occasionally high PIs. Suspect this is secondary to hypertension. MAPs 85-90. Increased hydralazine to 37.5 mg TID. Discussed the importance of using caution with position changes and to sit/rest if he is feeling lightheaded or dizzy. Additionally, while he reports that his recent fall was due to tripping, we also discussed at length that any fall, especially if he bumps his head must be reported to his LVAD coordinator.   Review of today's labs are stable. Renal function and LFTs are wnl. Anemia improving.    In discussion of follow up care, Mr. Sands tells me that it is too challenging to return to for his follow up visit with Dr. Lora on 1/12/23 due to distance and winter weather. We discussed that close follow up is essential. Will plan to have him follow up with [facility] in 2-4 weeks with labs prior.  He will also have [facility] infectious disease follow up on 12/29/22.  team on       # Chronic systolic heart failure secondary to ICM   # s/p HM3 LVAD on 7/8/12  c/b RV failure with temporary RVAD support with Protek duo (removed 7/21) and post chest closure hemopericardium s/p repeat washout on 7/12, epistaixis requiring reintubation for airway protection.  Stage D. NYHA Class III- confounded by recent surgery     Fluid status euvolemic off diuretics, Rx for lasix 20 mg daily PRN  ACEi/ARB: lisinopril 10 mg daily and hydralazine 37.5 mg TID, will work to transition him to increased dose of lisinopril and off hydralazine,  but awaiting repeat renal function  BB Deferred give recent LVAD implant with RV failure requiring protek   Aldosterone antagonist deferred while other medical therapy is prioritized and given recent hypovolemia  SGLT2i- defered given patient immunosuppression and unclear evidence in LVAD patients  RV support digoxin 125 mcg daily   SCD prophylaxis ICD  MAP: Goal MAP 65-85, current MAPs 85-90 with high PIs  LDH trends: 230-300  Anticoagulation: warfarin INR goal 2-3, today 1.8  Antiplatelet: ASA 81 mg daily    #Drive line infection  Recent admission 10/27-11/3/22 for drive line infection. CT CAP notable for persistent mediastinal mass with extensive inflammation around the drive line. He was evaluated by CVTS with no indication for I&D. He was started on broad spectrum antibiotics, which were later narrowed to vancomycin for Corynebacterium wound infection. Readmitted to [facility] 11/6-11/14/22 for ongoing fever, chills, SOB. Blood cultures 1/2 grew Actinomyces bacteremia; questionable from PICC. PICC was removed and he was discharged home on daptomycin and IV ceftriaxone. At follow up with [facility] (Dr. Wilson) on 12/1 he reported no side effects from the medications, scant drainage from driveline, no fevers, chills, or diarrhea. Ceftraixone and dapto were stopped and he was started on minocycline.   - wound cultures and CT A/P w/contrast today--> CT A/P reviewed with Dr. Zamora who recommended resuming IV abx x 2 weeks-this will be ordered and managed by  ID  - Repeat CT A/P in 2 weeks following completion of IV abx  - continue minocycline 100 mg BID  - follow up with Dr. Wilson 12/29/22    #Epistaxis; stable  Single, brief episode of epistasis this past week. Resolved with pressure after 5 minutes.   - Continue nasal saline    #CAD s/p PCI to LAD ('05)  - continue ASA and atorvastatin       Chart review time today: 15 minutes  Visit time today: 45 minutes  Total time spent today: 60 minutes    Shraddha Menjivar  INEZ, NP-C  Advance Heart Failure  12/11/22    CC  ADRIÁN NDIAYE R

## 2022-12-12 NOTE — LETTER
12/12/2022      RE: Dandy Sands  [address]  [address] [address]       Dear Colleague,    Thank you for the opportunity to participate in the care of your patient, Dandy Sands, at the [facility] [place] [facility]. Please see a copy of my visit note below.    HPI:   Mr. Sands is a 62 year old male with medical history pertinent for SLE, antiphospholipid syndrome, pulmonary embolism (on warfarin), ICM (EF 10-15%) s/p HM3 LVAD (7/28/22), s/p MDT CRT-D placement on 4/12/2018, and C. Diff.  He was referred for an EP evaluation due to ICD lead failure.  His ICD lead was found to have a sensing issue (low R wave amplitude) after the LVAD placement.    He denied any chest pain, SOB, dizziness or palpitation.    PAST MEDICAL HISTORY:  Past Medical History:   Diagnosis Date     Antiphospholipid antibody syndrome (H)      CAD (coronary artery disease)      Chronic systolic heart failure (H)      Ischemic cardiomyopathy      Mitral regurgitation      Systemic lupus erythematosus (H)        CURRENT MEDICATIONS:  Current Outpatient Medications   Medication Sig Dispense Refill     acetaminophen (TYLENOL) 325 MG tablet Take 3 tablets (975 mg) by mouth every 8 hours as needed for mild pain 270 tablet 0     amoxicillin (AMOXIL) 500 MG capsule Take 4 capsules (2,000 mg) by mouth as needed (take 1 hour prior to any dental procedures) 4 capsule 3     aspirin (ASA) 81 MG EC tablet Take 1 tablet (81 mg) by mouth daily       atorvastatin (LIPITOR) 40 MG tablet Take 1 tablet (40 mg) by mouth daily 30 tablet 0     digoxin (LANOXIN) 125 MCG tablet Take 1 tablet (125 mcg) by mouth daily 90 tablet 3     famotidine (PEPCID) 20 MG tablet Take 1 tablet (20 mg) by mouth 2 times daily 60 tablet 11     furosemide (LASIX) 20 MG tablet Take 1 tablet (20 mg) by mouth daily as needed (For weight gain or shortness of breath) 30 tablet 0     hydrALAZINE (APRESOLINE) 25  MG tablet Take 1 tablet (25 mg) by mouth 3 times daily 270 tablet 3     hydrocortisone 1 % CREA cream Place 1 g rectally daily as needed for itching 1 g 0     hydroxychloroquine (PLAQUENIL) 200 MG tablet Take 1 tablet (200 mg) by mouth 2 times daily 60 tablet 4     lisinopril (ZESTRIL) 10 MG tablet Take 1 tablet (10 mg) by mouth daily 30 tablet 0     loperamide (IMODIUM) 2 MG capsule Take 1 capsule (2 mg) by mouth 2 times daily as needed for diarrhea 30 capsule 0     melatonin 5 MG tablet Take 1 tablet (5 mg) by mouth At Bedtime 30 tablet 0     methylcellulose (CITRUCEL) 500 MG TABS tablet Take 2 tablets (1,000 mg) by mouth 2 times daily 60 tablet 0     multivitamin w/minerals (THERA-VIT-M) tablet Take 1 tablet by mouth daily  0     pramox-pe-glycerin-petrolatum (PREPARATION H) 1-0.25-14.4-15 % CREA cream Place rectally 2 times daily as needed for hemorrhoids Apply immediately after a bowel movement. 51 g 0     saline nasal (AYR SALINE) GEL topical gel Apply 1 applicator into each nare nightly as needed for congestion       tamsulosin (FLOMAX) 0.4 MG capsule Take 1 capsule (0.4 mg) by mouth daily 30 capsule 0     warfarin ANTICOAGULANT (COUMADIN) 5 MG tablet Take 1 tablet (5 mg) by mouth daily for 92 days 90 tablet 0       PAST SURGICAL HISTORY:  Past Surgical History:   Procedure Laterality Date     COLONOSCOPY N/A 05/23/2022    Procedure: COLONOSCOPY;  Surgeon: Parth Meneses MD;  Location: [facility] OR     CV CORONARY ANGIOGRAM N/A 05/24/2022    Procedure: Coronary Angiogram;  Surgeon: Mike Pope MD;  Location:  HEART CARDIAC CATH LAB     CV CORONARY ANGIOGRAM N/A 05/29/2022    Procedure: Coronary Angiogram;  Surgeon: Austin Bright MD;  Location:  HEART CARDIAC CATH LAB     CV CORONARY LITHOTRIPSY PCI Bilateral 05/24/2022    Procedure: Percutaneous Coronary Intervention - Lithotripsy;  Surgeon: Mike Pope MD;  Location:  HEART CARDIAC CATH LAB     CV INTRA AORTIC BALLOON N/A  06/17/2022    Procedure: Intraprocedure Aortic Balloon Pump Insertion;  Surgeon: Sherman Cerda MD;  [facility]     CV INTRA AORTIC BALLOON Right 07/03/2022    Procedure: Reposition of Subclavian Intraprocedure Aortic Balloon Pump;  Surgeon: Austin Bright MD;  Location:  HEART CARDIAC CATH LAB     CV INTRAVASULAR ULTRASOUND N/A 05/24/2022    Procedure: Intravascular Ultrasound;  Surgeon: Mike Pope MD;  [facility]ARD[facility] LAB     CV PCI ANGIOPLASTY N/A 05/29/2022    Procedure: Percutaneous Transluminal Angioplasty;  Surgeon: Austin Bright MD;  Location: [facility] HEART [facility]ARDIAC CATH LAB     CV PCI STENT DRUG ELUTING N/A 05/24/2022    Procedure: Percutaneous Coronary Intervention Stent;  Surgeon: Mike Pope MD;  Location:  HEART CARDIAC CATH LAB     CV RIGHT HEART CATH MEASUREMENTS RECORDED N/A 05/18/2022    Procedure: Right Heart Catheterization;  Surgeon: Justo Stewart MD;  [facility]     CV RIGHT HEART CATH MEASUREMENTS RECORDED N/A 05/24/2022    Procedure: Right Heart Catheterization;  Surgeon: Mike Pope MD;  [facility]ARD[facility] CATH LAB     CV RIGHT HEART CATH MEASUREMENTS RECORDED N/A 05/29/2022    Procedure: Right Heart Catheterization;  Surgeon: Austin Bright MD;  [facility]ARD[facility] LAB     CV RIGHT HEART CATH MEASUREMENTS RECORDED N/A 07/01/2022    Procedure: Right Heart Catheterization;  Surgeon: Mike Pope MD;  [facility] LAB     CV RIGHT HEART CATH MEASUREMENTS RECORDED N/A 09/23/2022    Procedure: Right Heart Catheterization;  Surgeon: Justo Stewart MD;  Location: [facility] HE[facility] CARDIAC CATH LAB     CV RIGHT HEART EXERCISE STRESS STUDY N/A 05/18/2022    Procedure: Stress Drug Study;  Surgeon: Justo tSewart MD;  Location: [facility] HEART CARDIAC CATH LAB     CV SWAN CHOCO PROCEDURE N/A 06/17/2022    Procedure: [place] Ch[place] Procedure;  Surgeon: Sherman Cerda MD;   [facility] LAB     INCISION AND DRAINAGE CHEST WASHOUT, COMBINED N/A 07/11/2022    Procedure: Chest washout and closure;  Surgeon: Darnell Morgan MD;  [facility]: [facility]     INCISION AND DRAINAGE CHEST WASHOUT, COMBINED N/A 07/12/2022    Procedure: INCISION AND DRAINAGE, WOUND, CHEST, WITH IRRIGATION;  Surgeon: Darius Zamora MD;  Location: [facility] OR     INSERT ARTERIAL LINE  07/18/2022          INSERT INTRAAORTIC BALLOON PUMP Right 06/23/2022    Procedure: INSERTION, INTRA-AORTIC BALLOON PUMP RIGHT SUBCLAVIAN ARTERY.;  Surgeon: Hayden Veras MD;  Location: [facility] OR     INSERT VENTRICULAR ASSIST DEVICE LEFT (HEARTMATE II/III) MINIMALLY INVASIVE N/A 07/08/2022    Procedure: MEDIAN STERNOTOMY.  CARDIOPULMONARY BYPASS.  INTRAOPERATIVE TRANSESOPHAGEAL ECHOCARDIOGRAM.  IMPLANT LEFT VENTRICULAR ASSIST DEVICE HEARTMATE III.  PLACEMENT OF PERCUTANEOUS RIGHT VENTRICULAR ASSIST DEVICE.  TEMPORARY CHEST CLOSURE;  Surgeon: Darius Zamora MD;  Location: [facility]     IR CHEST TUBE PLACEMENT NON-TUNNELED RIGHT  08/01/2022     IRRIGATION AND DEBRIDEMENT CHEST WASHOUT, COMBINED N/A 07/13/2022    Procedure: IRRIGATION AND DEBRIDEMENT, CHEST;  Surgeon: Darius aZmora MD;  Location: [facility] OR     MIDLINE DOUBLE LUMEN PLACEMENT Left 09/11/2022    Mid line ok to use     MIDLINE DOUBLE LUMEN PLACEMENT Right 09/14/2022    5FR DL midline.     PICC DOUBLE LUMEN PLACEMENT Right 05/28/2022    right basilic 5 fr dl picc 40 cm     PICC DOUBLE LUMEN PLACEMENT Right 08/15/2022    Right Lateral, 41 total length, 3 cm external length     PICC SINGLE LUMEN PLACEMENT Right 11/01/2022    41cm (2cm external), Lateral brachial vein, low SVC       ALLERGIES:   No Known Allergies    FAMILY HISTORY:  - Premature coronary artery disease  - Atrial fibrillation  - Sudden cardiac death     SOCIAL HISTORY:  Social History     Tobacco Use     Smoking status: Former     Smokeless tobacco: Never       ROS:   Constitutional: No fever,  "chills, or sweats. Weight stable.   ENT: No visual disturbance, ear ache, epistaxis, sore throat.   Cardiovascular: As per HPI.   Respiratory: No cough, hemoptysis.    GI: No nausea, vomiting, hematemesis, melena, or hematochezia.   : No hematuria.   Integument: Negative.   Psychiatric: Negative.   Hematologic:  Easy bruising, no easy bleeding.  Neuro: Negative.   Endocrinology: No significant heat or cold intolerance   Musculoskeletal: No myalgia.    Exam:  BP (!) 90/0 (BP Location: Right arm, Patient Position: Sitting, Cuff Size: Adult Small)   Pulse 99   Ht 1.705 m (5' 7.13\")   Wt 68.7 kg (151 lb 8 oz)   BMI 23.64 kg/m    GENERAL APPEARANCE: healthy, alert and no distress  HEENT: no icterus, no xanthelasmas, normal pupil size and reaction, normal palate, mucosa moist, no central cyanosis  NECK: no adenopathy, no asymmetry, masses, or scars, thyroid normal to palpation and no bruits, JVP not elevated  RESPIRATORY: lungs clear to auscultation - no rales, rhonchi or wheezes, no use of accessory muscles, no retractions, respirations are unlabored, normal respiratory rate  CARDIOVASCULAR: regular rhythm, normal S1 with physiologic split S2, no S3 or S4 and no murmur, click or rub, precordium quiet with normal PMI.  ABDOMEN: soft, non tender, without hepatosplenomegaly, no masses palpable, bowel sounds normal, aorta not enlarged by palpation, no abdominal bruits  EXTREMITIES: peripheral pulses normal, no edema, no bruits  NEURO: alert and oriented to person/place/time, normal speech, gait and affect  VASC: Radial, femoral, dorsalis pedis and posterior tibialis pulses are normal in volumes and symmetric bilaterally. No bruits are heard.  SKIN: no ecchymoses, no rashes    Labs:  CBC RESULTS:   Lab Results   Component Value Date    WBC 7.8 12/12/2022    RBC 3.69 (L) 12/12/2022    HGB 10.6 (L) 12/12/2022    HCT 33.8 (L) 12/12/2022    MCV 92 12/12/2022    MCH 28.7 12/12/2022    MCHC 31.4 (L) 12/12/2022    RDW 16.9 (H) " "12/12/2022     12/12/2022       BMP RESULTS:  Lab Results   Component Value Date     11/03/2022    POTASSIUM 3.8 11/03/2022    POTASSIUM 4.3 10/27/2022    POTASSIUM 3.9 09/01/2022    POTASSIUM 5.2 07/09/2022    CHLORIDE 108 (H) 11/03/2022    CHLORIDE 112 (H) 09/01/2022    CO2 22 11/03/2022    CO2 22 09/01/2022    ANIONGAP 9 11/03/2022    ANIONGAP 6 09/01/2022    GLC 96 11/03/2022     (H) 10/27/2022    GLC 94 09/01/2022    BUN 14.3 11/03/2022    BUN 13 09/01/2022    CR 0.70 11/03/2022    GFRESTIMATED >90 11/03/2022    ELDA 8.6 (L) 11/03/2022        INR RESULTS:  Lab Results   Component Value Date    INR 1.83 (H) 12/12/2022    INR 1.7 (A) 12/06/2022    INR 2.23 (A) 12/01/2022    INR 1.88 (A) 11/28/2022    INR 1.88 (A) 11/22/2022       Procedures:  CXR on 10/27/2022: Reviewed.      ICD interrogation on 10/27/2022: Reviewed.  Patient seen in the [facility] [place], [place] for evaluation and iterative programming of their ICD per MD orders. Patient was seen in clinic today for reset of audible alert tone reported by patient/family. Alert tone sounding for \"unsuccessful [facility] Alert Transmission\" of \"High RV Threshold\" on 10/27/22.      Device: Medtronic [id number] Evera XT   Normal device function   Mode: AAI/DDD /115 bpm  AP: 0.1%  : 0.2%  Intrinsic rhythm: SR w/ PVCs @ 96 bpm  Thoracic Impedance: Below reference line, suggesting possible intrathoracic fluid accumulation.  Short V-V intervals: 0  Lead Trends: Acute change in RV capture threshold noted since VAD implant in July '22 and continues to remain high, measuring today at 2.75 V @ 1.0 ms. R waves today measuring at 0.9 mV. RV lead impedance appears stable at this time.  Estimated battery longevity to RRT = 4.5 years. Battery voltage = 2.96 V.   Atrial Arrhythmia: 2 AT/AF episodes lasting 19 hr 55 min - 36 hr 11 min, most recently occurring on 10/16/22. Stored EGMs show AT/AF with controlled vent response (80-90s bpm).   AF [place]: 11.9% (since " "10/7/22)  Anticoagulant: warfarin  Ventricular Arrhythmia: 1 VT-NS lasting 5 sec @ 186 bpm.   Setting Changes: RV Capture Management programmed from \"Adaptive\" to OFF in setting of increased RV amplitude, with current programmed RV Amplitude: 5V @ 1.0 ms.   Patient has an appointment to see Dr. Lora today.   Plan: Send a remote transmission in 3 months (upon transfer of home monitor from previous device clinic). Follow-up with next available EP to address increase in RV amplitude following insertion of VAD.    KHANG Rodriguez RN     Dual lead ICD    ICD interrogation on 12/12/2022: Reviewed.  Patient seen in clinic for evaluation and iterative programming of their ICD per MD orders.      Device: [employer] [id number] Evera XT   Normal device function  Mode: AAI>DDD  bpm  AP: <0.1%  : <0.1%  Intrinsic rhythm: NSR 94 bpm  Thoracic Impedance: Below the reference line suggesting possible intrathoracic fluid accumulation.  Patient has a Medtronic 6949 lead.  Short V-V intervals: 1  RV lead trend appear stable.  Increase in RV threshold and decrease in R waves noted at time of LVAD implant (7/8/22). RV threshold measured 3 V @ 1 ms today. R waves measured 0.8 mV.   Estimated battery longevity to RRT = 4.2 years  Atrial Arrhythmia: 9 AT/AF episodes recorded for a total time of 35 seconds.  AF [place]: <0.1%  Anticoagulant: warfarin  Ventricular Arrhythmia: 0  Setting Changes: RV amplitude increased to 6 V  Patient has an appointment to see Dr. Maurice and Shraddha Menjivar NP today.   Plan: RTC in 3 months.  TRINA Amador RN     Dual lead ICD    Assessment and Plan:   # SLE  # Antiphospholipid syndrome  # C. Diff  # Pulmonary embolism (on warfarin)  # ICM (EF 10-15%) s/p HM3 LVAD (7/28/22)  # s/p MDT CRT-D placement on 4/12/2018.  His ICD lead was found to have a sensing issue (low R wave amplitude) after the LVAD placement.  The R wave amplitude recorded from the ICD lead is getting attenuated due to the myocardial damage " from the LVAD placement.  Therefore, the patient requires a replacement of the ICD lead.  The nature, risks, benefits, alternatives and anticipated results of the procedure were explained to the patient. These risks include but are not limited to local vascular damage, bleeding, tamponade and infection. After careful consideration the patient wished to proceed with the procedure. The patient was verbally consented. We will schedule the patient to have this done at his earliest convenience.     I spent a total of 40 min today to review the records, see the patient, and complete the documents.    CC  Patient Care Team:  Austin Sierra as PCP - General  Chantal Brasher, RN as VAD Coordinator  Kelly Ndiaye MD as MD (Advanced Heart Failure and Transplant Cardiology)  Antonio Mae MD as MD (Otolaryngology)  Kelly Ndiaye MD as Assigned Heart and Vascular Provider  James Sterling MD as Assigned Rheumatology Provider  KELLY NDIAYE        Please do not hesitate to contact me if you have any questions/concerns.     Sincerely,     Beckie Maurice MD

## 2022-12-14 ENCOUNTER — CARE COORDINATION (OUTPATIENT)
Dept: CARDIOLOGY | Facility: CLINIC | Age: 62
End: 2022-12-14

## 2022-12-14 DIAGNOSIS — Z95.811 LEFT VENTRICULAR ASSIST DEVICE PRESENT (H): ICD-10-CM

## 2022-12-14 RX ORDER — WARFARIN SODIUM 2.5 MG/1
TABLET ORAL
Qty: 192 TABLET | Refills: 3 | Status: ON HOLD | OUTPATIENT
Start: 2022-12-14 | End: 2023-03-26

## 2022-12-14 NOTE — PROGRESS NOTES
Called patient/caregiver to check in 10 week post discharge. Pt reports VAD parameters stable/WNL and weight stable. Reviewed medications and answered any questions. Patient reports sleeping well and no anxiety since being home with LVAD. Patient is able to move around the house and care for himself independently - pt recently started cardiac rehab.     Discussed specific new problems/stressors since being discharged from the hospital: reviewed appt findings from 12/12. Pt had PICC replaced yesterday and received first dose of IV abx at [facility]. Contacted ID clinic to confirm follow-up - pt is scheduled to see ID provider on 12/29, pt will also get repeat CT scan at that time. Called [facility] and spoke with RN about getting pt scheduled in 2-4 weeks as discussed at appt. She will call pt to set up appt. Pt reported that he will need ICD lead replacement but isn't sure about timing. Provided pt with phone number with scheduling so that he can call when he is ready. He is feeling overwhelmed by volume of appts and follow-up.  Empathized with patient and reviewed coping strategies: enlisting support from friends and love ones, attending patient and caregiver support groups, reviewing LVAD educational materials to reinforce knowledge, and talking about concerns with family/care providers/trusted others. Encouraged pt to page VAD Coordinator with any issues or questions. Pt verbalizes understanding.

## 2022-12-15 LAB
BACTERIA WND CULT: ABNORMAL
BACTERIA WND CULT: ABNORMAL
GRAM STAIN RESULT: ABNORMAL

## 2022-12-16 ENCOUNTER — TELEPHONE (OUTPATIENT)
Dept: ANTICOAGULATION | Facility: CLINIC | Age: 62
End: 2022-12-16

## 2022-12-16 DIAGNOSIS — Z95.811 LVAD (LEFT VENTRICULAR ASSIST DEVICE) PRESENT (H): ICD-10-CM

## 2022-12-16 DIAGNOSIS — I50.20 HEART FAILURE WITH REDUCED EJECTION FRACTION, NYHA CLASS III (H): ICD-10-CM

## 2022-12-16 DIAGNOSIS — Z95.811 LEFT VENTRICULAR ASSIST DEVICE PRESENT (H): ICD-10-CM

## 2022-12-16 DIAGNOSIS — I50.22 CHRONIC SYSTOLIC CONGESTIVE HEART FAILURE (H): Primary | ICD-10-CM

## 2022-12-16 NOTE — TELEPHONE ENCOUNTER
"Incoming call received from Dandy stating that he had labs drawn recently and has not heard from the [facility] regarding dosing. Writer's phone connection was lost. In the meantime writer looked through Care Everywhere and Epic for recent INR results. Last INR results found from 12/12/22-[facility] dosing recommendations were given at that time. Writer called back the phone number listed under \"Dandy\" and reached his son, Amos. Writer informed Amos that last INR result found was from 12/12/22. Amos reported that they are having Dandy tested for possible dementia-he has been forgetful and confused more lately. Per Amos-new PICC line was placed for abx with weekly dressing changes and lab draws-next scheduled lab is 12/20/22. Amos stated that writer did not need to call Dandy back, the he would call his father and update him. Amos is currently setting up medications.     Tracking calendar updated with next recheck lab.    LORENA MOSES RN-BC, [facility]  Anticoagulation Clinic  [phone]    "

## 2022-12-17 LAB
BACTERIA BLD CULT: NO GROWTH
BACTERIA BLD CULT: NO GROWTH

## 2022-12-19 LAB — BACTERIA WND CULT: NORMAL

## 2022-12-19 NOTE — ADDENDUM NOTE
Addendum  created 12/19/22 1050 by Goldberg, Leslie, MD    Clinical Note Signed, Diagnosis association updated, Intraprocedure Blocks edited, SmartForm saved

## 2022-12-20 LAB — INR (EXTERNAL): 1.8 (ref 0.9–1.1)

## 2022-12-21 ENCOUNTER — ANTICOAGULATION THERAPY VISIT (OUTPATIENT)
Dept: ANTICOAGULATION | Facility: CLINIC | Age: 62
End: 2022-12-21

## 2022-12-21 DIAGNOSIS — I50.20 HEART FAILURE WITH REDUCED EJECTION FRACTION, NYHA CLASS III (H): ICD-10-CM

## 2022-12-21 DIAGNOSIS — Z95.811 LEFT VENTRICULAR ASSIST DEVICE PRESENT (H): ICD-10-CM

## 2022-12-21 DIAGNOSIS — I50.22 CHRONIC SYSTOLIC CONGESTIVE HEART FAILURE (H): Primary | ICD-10-CM

## 2022-12-21 DIAGNOSIS — Z95.811 LVAD (LEFT VENTRICULAR ASSIST DEVICE) PRESENT (H): ICD-10-CM

## 2022-12-21 NOTE — PROGRESS NOTES
4 teeth extractions scheduled for 12/20. INR 2.5 or less    ANTICOAGULATION MANAGEMENT     Dandy Sands 62 year old male is on warfarin with subtherapeutic INR result. (Goal INR 2.0-3.0)    Recent labs: (last 7 days)     12/20/22  1455   INR 1.8*       ASSESSMENT       Source(s): Chart Review and Patient/Caregiver Call       Warfarin doses taken: Missed dose(s) may be affecting INR    Diet: No new diet changes identified    New illness, injury, or hospitalization: No    Medication/supplement changes: None noted    Signs or symptoms of bleeding or clotting: No    Previous INR: Subtherapeutic    Additional findings: Amos/Dandy do not want to do a booster dose today but would like to see INR higher-will take 2.5mg more on Friday instead of booster dose today       PLAN     Recommended plan for ongoing change(s) affecting INR     Dosing Instructions: booster dose then Increase your warfarin dose (5.6% change) with next INR in 1 week       Summary  As of 12/21/2022    Full warfarin instructions:  12/23: 7.5 mg; Otherwise 5 mg every Tue, Fri; 7.5 mg all other days   Next INR check:  12/27/2022             Telephone call with  son, Amos who verbalizes understanding and agrees to plan and who agrees to plan and repeated back plan correctly    Patient using outside facility for labs    Education provided:     Taking warfarin: Importance of taking warfarin as instructed    Symptom monitoring: monitoring for bleeding signs and symptoms and monitoring for clotting signs and symptoms    Contact [phone] with any changes, questions or concerns.     Plan made per [facility] anticoagulation protocol and per LVAD protocol    LORENA MOSES RN  Anticoagulation Clinic  12/21/2022    _______________________________________________________________________     Anticoagulation Episode Summary     Current INR goal:  2.0-3.0   TTR:  58.2 % (2.3 mo)   Target end date:  Indefinite   Send INR reminders to:  ANTICOAG LVAD    Indications     Chronic systolic congestive heart failure (H) [I50.22]  LVAD (left ventricular assist device) present (H) [Z95.811]  Heart failure with reduced ejection fraction  NYHA class III (H) [I50.20]  Left ventricular assist device present (H) [Z95.811]           Comments:  Follow VAD Anticoag protocol:Yes: HeartMate 3   Bridging: No bridging epistaxis.   Date VAD placed: 7/8/22         Anticoagulation Care Providers     Provider Role Specialty Phone number    Kelly Lora MD Referring Cardiovascular Disease [phone]

## 2022-12-22 ENCOUNTER — CARE COORDINATION (OUTPATIENT)
Dept: CARDIOLOGY | Facility: CLINIC | Age: 62
End: 2022-12-22

## 2022-12-22 NOTE — PROGRESS NOTES
Called patient/caregiver to check in 12 weeks post discharge. Pt reports VAD parameters WNL/stable (spd: 5100, flow: 3.7L/min, PI 6.6) and weight 148lb. Reviewed medications and answered any questions. Patient reports sleeping well and no anxiety since being home with LVAD. Patient is able to move around the house and care for himself independently.      Discussed specific new problems/stressors since being discharged from the hospital: pt reports some issues with filling med box - needs assistance with this task. He does occasionally forget to take a dose of meds. Pt reports drainage from DLES is improving a little since starting IV abx. He has an appt with ID in [place] next week. Empathized with patient and reviewed coping strategies: enlisting support from friends and love ones, attending patient and caregiver support groups, reviewing LVAD educational materials to reinforce knowledge, and talking about concerns with family/care providers/trusted others. Encouraged pt to page VAD Coordinator with any issues or questions. Pt verbalizes understanding.    Pt has been scheduled for ICD lead revision on 1/13/23. Pt confirmed he will keep his appt's on 1/12/23 as previously scheduled.

## 2022-12-27 ENCOUNTER — CARE COORDINATION (OUTPATIENT)
Dept: CARDIOLOGY | Facility: CLINIC | Age: 62
End: 2022-12-27

## 2022-12-27 DIAGNOSIS — Z95.811 LVAD (LEFT VENTRICULAR ASSIST DEVICE) PRESENT (H): ICD-10-CM

## 2022-12-27 DIAGNOSIS — B99.9 INFECTION: Primary | ICD-10-CM

## 2022-12-27 LAB — INR (EXTERNAL): 1.7 (ref 0.9–1.1)

## 2022-12-27 NOTE — PROGRESS NOTES
Situation:   Writer spoke with Family today to discuss multiple concerns.     Background/Assessment:  Patient reports always having some chest pain, but in the last 24 hours is has been significantly worse. It does not radiate, stays in the same spot it always has been. His son reports that he has seen a significant loss of appetite and increase in urination. He doesn't have a current weight as he is staying at his son's place. Son reports no fluid retention that he can see.     He denies any fever/chills, but is constantly cold. He has a known driveline infection but son reports drainage and color has been improving. They report INR hasn't been therapeutic in weeks, but denies any power spikes or tea colored urine. No abnormal colored stools.      12/27/22 0900   MCS VAD Information   Implant LVAD   LVAD Pump HeartMate 3   Heartmate 3 LEFT VS   Flow (Lpm) 3.9 Lpm   Pulse Index (PI) 3.3 PI   Speed (rpm) 5100 rpm   Power (persaud) 3.6 persaud         Recommendation:  Recommended patient goes [place] ED as it seems as if an infection is brewing. Writer will call ED to give report, encourage an abdominal CT and cultures. Son verbalized understanding.

## 2022-12-27 NOTE — PROGRESS NOTES
Patient being admitted to [facility] for driveline infection. [facility] contacted to have imaging forwarded to [facility] for outside read. CVTS and Cardiology updated on patient status.

## 2022-12-27 NOTE — PROGRESS NOTES
D: Received call from patient's son concerning discharging from local ED. Concerned for c/o loss of appetite/ increase urination.    I/A: Patient's son is concerned regarding INR of 1.7 and being discharged from ED. States INR has been under 2 for a few weeks now. CT scan as well as lab work was completed at local ED.     Shelia MOELLER from [facility] paged to update as well, yellowish green drainage, no increase/ no change. Nose bleed earlier- was controlled. She also reiterated to patient/family INR of 1.7 ok, and coumadin clinic will continue to dose up as needed.     P: I discussed with Dr. Lora the above information, Dr. Lora feels it is reasonable that patient is discharge. Advised patient's son to touch base with coumadin clinic regarding INR. Dr. Zamora to review CT that was done today and we will touch base with patient tomorrow. LDH in process, blood cultures in process, driveline culture done.  ID & clinic @ [facility] on Thursday.  Patient, Family notified to page on-call coordinator if symptoms worsen or with other concerns. Patient, Family verbalized understanding.

## 2022-12-28 ENCOUNTER — CARE COORDINATION (OUTPATIENT)
Dept: CARDIOLOGY | Facility: CLINIC | Age: 62
End: 2022-12-28

## 2022-12-28 NOTE — PROGRESS NOTES
Mr. Sands paged the VAD Coordinator on-call to state that his son's girlfriend, who had been the one changing his daily driveline exit site (DLES) dressing is now refusing to continue helping him with this task. The patient was wondering if he could just perform the dressings himself. 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     The patient states that he will speak to his daughter, Lina, who is also trained on how to perform the dressing change to see if she could help with the dressing change responsibilities, at least temporarily.     The LVAD coordinator offered for Mr. Sands to come on Friday (12/30/22) to get trained in on how to perform the dressing change at clinic, but the patient does not want to drive here. He has agreed to call our  so that we can schedule two nursing appointments on 1/11/23 and 1/12/23 in order to train him on his dressing change. He is scheduled for an ICD lead revision on 1/13/23 and the VAD coordinator explained that this intervention may impact his range of motion and ability to perform the dressing change for a few weeks. It would be ideal if his daughter could continue to help with the dressing change while he recovers and then he could take over once he is healed. The patient agreed to this plan.

## 2022-12-29 ENCOUNTER — ANTICOAGULATION THERAPY VISIT (OUTPATIENT)
Dept: ANTICOAGULATION | Facility: CLINIC | Age: 62
End: 2022-12-29

## 2022-12-29 DIAGNOSIS — Z95.811 LEFT VENTRICULAR ASSIST DEVICE PRESENT (H): ICD-10-CM

## 2022-12-29 DIAGNOSIS — Z95.811 LVAD (LEFT VENTRICULAR ASSIST DEVICE) PRESENT (H): ICD-10-CM

## 2022-12-29 DIAGNOSIS — I50.20 HEART FAILURE WITH REDUCED EJECTION FRACTION, NYHA CLASS III (H): ICD-10-CM

## 2022-12-29 DIAGNOSIS — I50.22 CHRONIC SYSTOLIC CONGESTIVE HEART FAILURE (H): Primary | ICD-10-CM

## 2022-12-29 NOTE — PROGRESS NOTES
ANTICOAGULATION MANAGEMENT     Dandy Sands 62 year old male is on warfarin with subtherapeutic INR result. (Goal INR 2.0-3.0)    Recent labs: (last 7 days)     12/27/22  0000   INR 1.7*       ASSESSMENT       Source(s): Chart Review and Patient/Caregiver Call       Warfarin doses taken: Warfarin taken as instructed and Patient confirms that he has been taking 7.5mg daily and hasn't missed any doses.     Diet: No new diet changes identified    New illness, injury, or hospitalization: No    Medication/supplement changes: None noted    Signs or symptoms of bleeding or clotting: No    Previous INR: Subtherapeutic    Additional findings: None       PLAN     Recommended plan for no diet, medication or health factor changes affecting INR     Dosing Instructions: Increase your warfarin dose (5% change) with next INR in 4 days       Summary  As of 12/29/2022    Full warfarin instructions:  10 mg every Thu; 7.5 mg all other days   Next INR check:  1/3/2023             Telephone call with Dandy who verbalizes understanding and agrees to plan and who agrees to plan and repeated back plan correctly    Patient using outside facility for labs    Education provided:     Taking warfarin: Importance of taking warfarin as instructed    Goal range and lab monitoring: goal range and significance of current result and Importance of therapeutic range    Plan made per [facility] anticoagulation protocol and per LVAD protocol    Holli Silva RN  Anticoagulation Clinic  12/29/2022    _______________________________________________________________________     Anticoagulation Episode Summary     Current INR goal:  2.0-3.0   TTR:  52.9 % (2.5 mo)   Target end date:  Indefinite   Send INR reminders to:  ANTICOAG LVAD    Indications    Chronic systolic congestive heart failure (H) [I50.22]  LVAD (left ventricular assist device) present (H) [Z95.811]  Heart failure with reduced ejection fraction  NYHA class III (H) [I50.20]  Left ventricular assist  device present (H) [Z95.810]           Comments:  Follow VAD Anticoag protocol:Yes: HeartMate 3   Bridging: No bridging epistaxis.   Date VAD placed: 7/8/22         Anticoagulation Care Providers     Provider Role Specialty Phone number    Kelly Lora MD Referring Cardiovascular Disease [phone]

## 2022-12-30 ENCOUNTER — CARE COORDINATION (OUTPATIENT)
Dept: CARDIOLOGY | Facility: CLINIC | Age: 62
End: 2022-12-30

## 2022-12-30 DIAGNOSIS — Z95.811 LVAD (LEFT VENTRICULAR ASSIST DEVICE) PRESENT (H): ICD-10-CM

## 2022-12-30 DIAGNOSIS — B99.9 INFECTION: ICD-10-CM

## 2022-12-30 DIAGNOSIS — I50.22 CHRONIC SYSTOLIC CONGESTIVE HEART FAILURE (H): Primary | ICD-10-CM

## 2022-12-30 NOTE — PROGRESS NOTES
Dr Zamora reviewed the CT abd/pelvis from [facility]. He said there is a small amount of fluid around the driveline. Dandy did see ID at [place] on 12/29. He is on IV antibiotics per Dandy's report. Dr Zamora would like a CT chest/abd/pelvis w/contrast in a week. He said if this fluid collection persists, he will need an I&D.    CT ordered and faxed to [facility] CT scheduling at [phone]

## 2023-01-02 ENCOUNTER — CARE COORDINATION (OUTPATIENT)
Dept: CARDIOLOGY | Facility: CLINIC | Age: 63
End: 2023-01-02

## 2023-01-02 NOTE — PROGRESS NOTES
Received notification that pt needs f/u CT chest/abd/pelvis this week, to compare images from last week. [facility] to ask if provider is willing to order again, as they are unable to accept order from outside provider and need a PA for the scan. Clinics are closed today and unable to speak with anyone. Will call clinics tomorrow to make request.     Called pt to check in. Pt reports feeling well. Thinks drainage is improving. He reports wanting to learn the dressing, as it is getting difficult to get to his son or daughter every day. Set up a nurse visit appt on 1/11/23 at 2pm to provide edu. Informed pt that this does not guarantee that he will be signed off after this session, and he may still need assistance from his kids.

## 2023-01-04 ENCOUNTER — TELEPHONE (OUTPATIENT)
Dept: ANTICOAGULATION | Facility: CLINIC | Age: 63
End: 2023-01-04

## 2023-01-04 NOTE — TELEPHONE ENCOUNTER
ANTICOAGULATION     Dandy Sands is overdue for INR check.      Spoke with patient and he will have an INR check tomorrow.     Holli Silva RN

## 2023-01-05 ENCOUNTER — CARE COORDINATION (OUTPATIENT)
Dept: CARDIOLOGY | Facility: CLINIC | Age: 63
End: 2023-01-05

## 2023-01-05 LAB — INR (EXTERNAL): 2.8 (ref 0.9–1.1)

## 2023-01-05 NOTE — PROGRESS NOTES
Spoke with pt's ID provider from [facility], Dr. Wilson. MD is agreeable to ordering CT locally. He is not sure when CT will be approved as it does require PA. Discussed timing of as soon as possible. If unable to complete by Tuesday next week, will order and schedule for pt to have scan done here on Wednesday, 1/11/23.   MD reports minimal change to drainage while on IV abx over the last month.

## 2023-01-06 ENCOUNTER — ANTICOAGULATION THERAPY VISIT (OUTPATIENT)
Dept: ANTICOAGULATION | Facility: CLINIC | Age: 63
End: 2023-01-06

## 2023-01-06 DIAGNOSIS — Z95.811 LEFT VENTRICULAR ASSIST DEVICE PRESENT (H): ICD-10-CM

## 2023-01-06 DIAGNOSIS — I50.22 CHRONIC SYSTOLIC CONGESTIVE HEART FAILURE (H): ICD-10-CM

## 2023-01-06 DIAGNOSIS — I50.22 CHRONIC SYSTOLIC CONGESTIVE HEART FAILURE (H): Primary | ICD-10-CM

## 2023-01-06 DIAGNOSIS — I50.20 HEART FAILURE WITH REDUCED EJECTION FRACTION, NYHA CLASS III (H): ICD-10-CM

## 2023-01-06 DIAGNOSIS — Z95.811 LVAD (LEFT VENTRICULAR ASSIST DEVICE) PRESENT (H): ICD-10-CM

## 2023-01-06 DIAGNOSIS — Z79.899 LONG TERM USE OF DRUG: ICD-10-CM

## 2023-01-06 NOTE — PROGRESS NOTES
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 He remains on daily dressing changes and IV abx. Reviewed literature for silverlon disc use at LVAD drive line exit site and recommendation of [facility] to use for reduction of infection risk. Pt is a good candidate to use silverlon disc with daily dressing change.   Called pt and son to review option. They are agreeable. [facility] and sent video and letter with instructions on how to use. Pt will be in clinic next week for dressing change education, will include training for silverlon disc.

## 2023-01-06 NOTE — PROGRESS NOTES
ANTICOAGULATION MANAGEMENT     Dandy Sands 62 year old male is on warfarin with therapeutic INR result. (Goal INR 2.0-3.0)    Recent labs: (last 7 days)     01/05/23  0000   INR 2.8*       ASSESSMENT       Source(s): Chart Review       Warfarin doses taken: Reviewed in chart    Diet: No new diet changes identified    New illness, injury, or hospitalization: No    Medication/supplement changes: None noted    Signs or symptoms of bleeding or clotting: No    Previous INR: Subtherapeutic    Additional findings: None       PLAN     Recommended plan for no diet, medication or health factor changes affecting INR     Dosing Instructions: Continue your current warfarin dose with next INR in 1 week       Summary  As of 1/6/2023    Full warfarin instructions:  10 mg every Thu; 7.5 mg all other days   Next INR check:  1/12/2023             Detailed voice message left for  leena Trinidad with dosing instructions and follow up date.     Lab visit scheduled    Education provided:     Please call back if any changes to your diet, medications or how you've been taking warfarin    Contact [phone] with any changes, questions or concerns.     Plan made per [facility] anticoagulation protocol and per LVAD protocol    Riri Giron RN  Anticoagulation Clinic  1/6/2023    _______________________________________________________________________     Anticoagulation Episode Summary     Current INR goal:  2.0-3.0   TTR:  55.2 % (2.8 mo)   Target end date:  Indefinite   Send INR reminders to:  ANTICOAG LVAD    Indications    Chronic systolic congestive heart failure (H) [I50.22]  LVAD (left ventricular assist device) present (H) [Z95.811]  Heart failure with reduced ejection fraction  NYHA class III (H) [I50.20]  Left ventricular assist device present (H) [Z95.811]           Comments:  Follow VAD Anticoag protocol:Yes: HeartMate 3   Bridging: No bridging epistaxis.   Date VAD placed: 7/8/22         Anticoagulation Care Providers     Provider Role  Specialty Phone number    Kelly Lora MD Referring Cardiovascular Disease [phone]

## 2023-01-10 ENCOUNTER — ANTICOAGULATION THERAPY VISIT (OUTPATIENT)
Dept: ANTICOAGULATION | Facility: CLINIC | Age: 63
End: 2023-01-10

## 2023-01-10 DIAGNOSIS — I50.20 HEART FAILURE WITH REDUCED EJECTION FRACTION, NYHA CLASS III (H): ICD-10-CM

## 2023-01-10 DIAGNOSIS — I50.22 CHRONIC SYSTOLIC CONGESTIVE HEART FAILURE (H): Primary | ICD-10-CM

## 2023-01-10 DIAGNOSIS — Z95.811 LEFT VENTRICULAR ASSIST DEVICE PRESENT (H): ICD-10-CM

## 2023-01-10 DIAGNOSIS — Z95.811 LVAD (LEFT VENTRICULAR ASSIST DEVICE) PRESENT (H): ICD-10-CM

## 2023-01-10 LAB — INR (EXTERNAL): 3.7 (ref 0.9–1.1)

## 2023-01-10 NOTE — PROGRESS NOTES
ANTICOAGULATION MANAGEMENT     Dandy Sands 62 year old male is on warfarin with supratherapeutic INR result. (Goal INR 2.0-3.0)    Recent labs: (last 7 days)     01/10/23  0000   INR 3.7*       ASSESSMENT       Source(s): Chart Review    Previous INR was Therapeutic last visit; previously outside of goal range    Medication, diet, health changes since last INR chart reviewed; none identified spoke with son who told me that his dad prefers we call him with the plan.            PLAN     Recommended plan for no diet, medication or health factor changes affecting INR     Dosing Instructions: partial hold then decrease your warfarin dose (4.5% change) with next INR in 1 week       Summary  As of 1/10/2023    Full warfarin instructions:  1/10: 5 mg; Otherwise 7.5 mg every day   Next INR check:  1/17/2023             Detailed voice message left for Dandy with dosing instructions and follow up date.   Sent [employer] message with dosing and follow up instructions    Patient using outside facility for labs    Education provided:     Please call back if any changes to your diet, medications or how you've been taking warfarin    Contact [phone] with any changes, questions or concerns.     Plan made per [facility] anticoagulation protocol and per LVAD protocol    Kiki Dave RN  Anticoagulation Clinic  1/10/2023    _______________________________________________________________________     Anticoagulation Episode Summary     Current INR goal:  2.0-3.0   TTR:  53.1 % (3 mo)   Target end date:  Indefinite   Send INR reminders to:  ANTICOAG LVAD    Indications    Chronic systolic congestive heart failure (H) [I50.22]  LVAD (left ventricular assist device) present (H) [Z95.811]  Heart failure with reduced ejection fraction  NYHA class III (H) [I50.20]  Left ventricular assist device present (H) [Z95.811]           Comments:  Follow VAD Anticoag protocol:Yes: HeartMate 3   Bridging: No bridging epistaxis.   Date VAD placed:  7/8/22         Anticoagulation Care Providers     Provider Role Specialty Phone number    Kelly Lora MD Referring Cardiovascular Disease [phone]

## 2023-01-11 ENCOUNTER — ALLIED HEALTH/NURSE VISIT (OUTPATIENT)
Dept: CARDIOLOGY | Facility: CLINIC | Age: 63
End: 2023-01-11
Attending: INTERNAL MEDICINE
Payer: COMMERCIAL

## 2023-01-11 DIAGNOSIS — T82.7XXA INFECTION ASSOCIATED WITH DRIVELINE OF LEFT VENTRICULAR ASSIST DEVICE (LVAD) (H): Primary | ICD-10-CM

## 2023-01-11 DIAGNOSIS — Z95.811 LVAD (LEFT VENTRICULAR ASSIST DEVICE) PRESENT (H): ICD-10-CM

## 2023-01-11 PROCEDURE — 87075 CULTR BACTERIA EXCEPT BLOOD: CPT

## 2023-01-11 PROCEDURE — 87070 CULTURE OTHR SPECIMN AEROBIC: CPT

## 2023-01-11 NOTE — NURSING NOTE
Pt presented to clinic for education on performing his own LVAD drive line exit site dressing change. Pt has had a chronic infection for a few months and is on IV abx, managed by [facility] ID.   Practiced donning sterile gloves, practiced dressing change on dressing change board, practiced dropping silverlon patch onto sterile field.   Unfortunately, pt is not appropriate to do his own dressings at this time and it was recommended that he continue to have family change his dressing.   Site cultured today. Drainage is improved since last assessment on 12/12/22. Pt had CT at [facility] yesterday, images are available in PACS.   Also made the following recommendations:/  1. Switch to betadine cleanser (pt reported /sensitivity) and clean entire 4x4 area surrounding driveline exit site. Order to be faxed to [facility]. Small supply give to pt today.  2. Start using silverlon patch - already ordered and received by [employer].  3. Change dressing every day. Pt reports it is sometimes hard to get to his caregivers home to get the dressing done, which was the main reason he wanted to learn the dressing for himself. Reinforced that it is absolutely imperative that the dressing be changed every day.   4. Transition ID care to  Health team. [facility]   Pt and son verbalized understanding.

## 2023-01-11 NOTE — PROGRESS NOTES
RE: Dandy Sands  MR#: [id number]  : 1960  DOS: 2023      NEUROPSYCHOLOGICAL CONSULTATION      REASON FOR REFERRAL:  Dandy Sands is a 62 year old male with 10 years of education. The patient was referred for a neuropsychological re-evaluation by Dr. Lawrence to assess changes in his cognitive and emotional functioning secondary to memory concerns in the context of a history of heart disease and LVAD implantation in . The evaluation was requested in order to document his current functioning, assist with the differential diagnosis, and provide appropriate recommendations. The patient was informed that the evaluation included multiple measures of performance and symptom validity, assist with the differential diagnosis, and provide appropriate recommendations. The patient was informed that the evaluation included multiple measures of performance and symptom validity, and he was encouraged to provide his best effort at all times.  The nature of the neuropsychological evaluation was also discussed, including limits of confidentiality (for suspected child or elder abuse, potential homicide or suicide, and court orders). The patient was also informed that the report would be placed on the electronic medical records system.  The patient was also given an opportunity to ask questions. The patient indicated that he understood the information and consented to participate in the evaluation.    PROCEDURES:   Review of records and clinical interview  Mental Status-orientation, Wide Range Achievement Test-4, Wechsler Adult Intelligence Scale-IV, Kent Verbal Learning Test-Revised, Clock Drawing Test, Verbal Fluency Test, Chadwick Depression Inventory, Chadwick Anxiety Inventory, Brad Complex Figure Test, Trailmaking Test, [place] Naming Test, TOMM, Judgment of Line Orientation Test, Stroop Test, Wechsler Memory Scale-IV (selected subtests) and BDAE Complex Ideational Material    REVIEW OF RECORDS: A message from  the treatment team noted the patient  was implanted in July of this year. Your assessment pre-implant indicated some cognitive deficits, and requested a brief re-assessment after healing from surgery. His kids have recently expressed some concerns to me over his cognition - mostly forgetfulness, some mild confusion.   Records indicated that the patient had a previous neuropsychological evaluation with Dr. Villafuerte on 5/25/22 and noted  a history of CAD s/p remote PCI to LAD, ICM LVEF 30% s/p CRT-D, severe MR, APL with hx of DVT/PE on chronic anticoagulation with warfarin, hx of COVID-19 1/2022 (was hospitalized, not intubated), HTN, and HLD who presented to [facility] as a transfer from [facility] in [place] for evaluation of multivessel coronary artery disease and moderate-severe functional mitral regurgitation. He is being considered for advanced therapies for heart failure, including LVAD and transplant.  The report stated that the patient  provides a mixed understanding of his medical history and current concerns. For example, he knew he just underwent a surgery during this hospitalization, but he could not recall what the surgery was for (the nurse at bedside reminded him he just got a stent put in). He tells me that heart failure was first diagnosed when he had his heart attack around age 46. If he goes through with advanced therapies, he hopes to be able to live longer and to be able to breathe without much effort. He has not had LVAD/transplant education sessions yet, and he says he does not really have any idea of what the presurgical work-up entails. He says he has not been told anything about potential risks with the treatments.  The report concluded that  the cognitive test results are abnormal. Mr. Sands demonstrates diffuse deficits in the cognitive domains assessed today, with only a few performances reaching the lower end of normal/average while most are below average. There are no observed  "fluctuations in alertness and cognizance to suggest delirium. I think his cognitive capacity is suppressed by encephalopathy from his current ill state. I cannot rule out lingering effects from sedation in his surgery yesterday afternoon or sleep aids/anxiolytics administered last night. The cognitive data are consistent with troubles seen in the interview, where he is a bit confused and slow to recall some information. His cognitive state will likely improve as his health stabilizes.      Records from 12/12/22 noted the patient has  medical history pertinent for SLE, antiphospholipid syndrome, pulmonary embolism (on warfarin), ICM (EF 10-15%) s/p HM3 LVAD (7/28/22), s/p MDT CRT-D placement on 4/12/2018, and C. Diff. He was referred for an EP evaluation due to ICD lead failure. His ICD lead was found to have a sensing issue (low R wave amplitude) after the LVAD placement.  A CT scan of the brain report from 8/22/22 noted  No acute intracranial pathology. No acute loss of gray-white differentiation. 2. Moderate diffuse cerebral atrophy with patchy periventricular and subcortical white matter hypo-attenuation, likely sequelae of chronic small vessel ischemic disease.  Records noted the patient is being treated with Coumadin, hydralazine, amoxicillin, medicine, digoxin, aspirin, Lasix, Pepcid, Plaquenil, Flomax, Imodium, melatonin, Citrucel, hydrocortisone, atorvastatin, lisinopril, acetaminophen, Preparation H, and nasal saline.  Records from 12/22/22 indicated that the patient \"reports some issues with filling med box - needs assistance with this task. 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    CLINICAL INTERVIEW: The patient was interviewed via video platform to [facility] [place] and he was interviewed alone.  On interview, the patient acknowledged having memory difficulties, but he also indicated " "these might be age-related changes.  For example, he reported that he will misplace items at times, but eventually will find them.  He denied significant any changes in his memory since the LVAD procedure on July 8, 2022.  However, he stated that he has had frequent \"weird dreams\" since the operation.  In terms of attention/concentration, he reported that if it is not important to him he will not remember it.  He denied difficulty with making decisions, although he has second thoughts frequently.  Functionally, he reported that he drives and denied difficulty with driving.  He also denied difficulty with financial management.  In terms of medication management, he reported that because he takes 21 pills, so he began using a service through his pharmacy where the medications are placed in prepackaged containers.  He reported this has been very effective and he does not miss medication dosages.  He also denied significant problems with basic household chores.  However, he reported that he has ongoing chest pain that is being evaluated later today at a subsequent medical appointment.  He noted that this ongoing chest pain interferes with his ability to complete physical tasks.    In terms of his mood, the patient reported that he is \"90%\" in a good mood, although he acknowledged getting frustrated because the chest pain has been occurring for the past 3 months.  However, he said he is usually \"pretty .\"  He reported that his sleeping has been \"terrible\" since the LVAD procedure.  He stated that his sleep is very disrupted and he would typically only sleep 2-3 hours per night.  He reported that he naps a great deal during the day, and likely sleeps a great deal during the day.  He indicated that his appetite is about half of its usual, and he said that he eat smaller portions more frequently.  He reported that his weight is stable.  He denied any contact with mental health professionals.  He also denied any " current or previous suicidal or homicidal ideation, denied any hallucinations or delusions.  He reported he has not drank any alcohol for the past 1.5 years because of his medication regime.  He indicated that he quit tobacco use 16 years ago.  He noted that he used illicit substances in high school, but denied any recent use.    Medically, the patient reported he has generally been stable since the LVAD procedure.  However he noted that his left leg and right arm get really weak, and as noted above he has ongoing chest pain since the implantation which is being evaluated at an appointment later today.  He reported that he participates in physical therapy twice a week, but no longer participates in occupational therapy.  He reported that he contracted Covid-19 while in the hospital about 2-3 months ago.  He reported that he wears eyeglasses to read.  He also reported that his children say he needs hearing aids and he has been told by neighbors that his television is too loud.  However he indicated he currently does not have hearing aids.  He noted the medications were up-to-date in the records.    The patient confirmed that he left high school in the 11th grade.  He reported that he smoked marijuana frequently so he was an average student.  He noted that he previously worked as a  and is on disability.  The patient reported that he  in 1997 has been  one time.  He also reported that he has 2 children, a son age 37 and a daughter age 39, as well as one grandchild.  He noted that he lives at home alone with his pet cat.    BEHAVIORAL OBSERVATIONS:  On examination, the patient was casually but appropriately dressed and groomed. The patient was cooperative with the evaluation and was talkative.  Speech was normal in volume, rate and tone.  The patient also appeared to put forth their best effort on all tasks. Thus, the results of this evaluation are a reasonably valid reflection of the  patient's current level of functioning. There were no indications of hallucinations, delusions or unusual thought processes and the patient was generally well oriented to personal information, place, and time. There were no demonstrations of a practical memory problem. The patient was a good historian, with a self-report consistent with medical records. Affect was also generally appropriate.      RESULTS: Formal performance validity measures were within expected limits and consistent with the above noted observations.  Psychometric estimates of the patient's premorbid global cognitive functioning based upon single word reading ability were in the low average range, which is consistent with his educational and occupational history.    Measures of attention/concentration were broadly within expected limits. For example, a visual scanning task that integrates attention was in the average range while a digit span task was in the low average range, which is a mild improvement compared to the previous evaluation.  Speed of information processing was also slower than expected.  For example, motor-free processing speed was in the below average to low average range and also a mild improvement compared to previous evaluation.    Measures of the patient's memory functioning were also poorer than expected.  For example, immediate and delayed verbal recall on a word list learning task were in the exceptionally low range.  Verbal recognition memory on this task was also in the exceptionally low range.  On a more structured story memory test, immediate recall was in the below average range, but delayed recall was in the low average range and slightly better.  In terms of visual memory, immediate and delayed visual recall of a complex figure drawing was in the below average to low average range.  Verbal recognition memory was also in the low average range.  The patient tended to have improved encoding of new information on verbal  tasks with more structured memory tasks.  There was no evidence for rapid forgetting of previously learned verbal or visual information, however.  Further, while memory functioning was poorer than expected, there was evidence for mild improvement compared to previous evaluation.    Expressive language functioning was generally within expected limits.  For example, confrontation naming was in the average range.  Further, semantic fluency was in the average range.  Phonemic fluency was in the low average range.  This results indicated evidence for mild improvement in language functioning compared to previous evaluation.  Receptive language functioning as assessed by complex ideational material task, was in exceptionally low range, but during the interview no difficulties with receptive language were observed.  However, his performance was likely adversely impacted by difficulties other domains, such as processing speed and executive functioning.    Visual-spatial and visual constructional abilities were variable.  For example, motor-free line orientation judgment was poorer than expected.  However, on complex figure drawing and clock drawing tasks, there was no evidence for significant visual-spatial distortions.  There was no evidence for significant decline in visual-spatial abilities.    Measures of the patient's executive functioning were likewise slightly poorer than expected.  For example, a complex visual scanning task that integrates cognitive flexibility was in the low average range.  Further, a measure of response admission integrates processing speed was in the low average range.  A functional measure of health and safety awareness was at the lower end of expected limits and consistent with the previous evaluation.  Verbal abstract reasoning was at the lower end of the average range while visual reasoning was in the low average range.  These results indicated a mild improvement compared to previous  evaluation.  Further, there was no evidence for planning or organizational difficulties on complex figure drawing or clock drawing tasks.  These results indicated evidence for improvement in executive functioning compared to previous neuropsychological evaluation.    Assessment of the patient's emotional functioning was completed utilizing the clinical interview and self-report measures depression and anxiety.  On the self-report measures, the patient endorsed mild depressive and mild anxiety symptoms.  This is generally consistent with the previous neuropsychological evaluation.    SUMMARY:  In summary, the neuropsychological assessment results indicated no evidence for significant change or decline in global cognitive functioning.  In general, the results indicated evidence for mild improvement in several domains compared to the previous neuropsychological evaluation, including memory functioning, executive function, language functioning, and attention/concentration. Nonetheless, speed of information processing was mildly slowed, and he continued to have mild difficulty in encoding of new information, particularly structured task.  Executive functioning was also poorer than expected, although improved compared to previous evaluation.  The patient also endorsed mild depressive and anxiety symptoms, which is consistent the previous evaluation.  There was no evidence for significant decline in cognitive functioning.  Given the relatively recent time since his surgery, further improvement in his cognitive functioning is likely.  The pattern of results indicated the patient did not meet criteria for mild cognitive impairment (MCI) or dementia.  However, his history of cardiac difficulties is likely contributing to his cognitive difficulties.  Additionally, other issues such as sleep difficulties, possible hearing issues, low mood, and anxiety could contribute to cognitive difficulties.    RECOMMENDATIONS:   1.  The  patient strongly encouraged to continue working closely with his treating healthcare providers, given his history of LVAD placement.  2. Given his hearing difficulties, a referral for an audiology evaluation is recommended.  3.  The patient reported ongoing difficulties with disrupted sleep, therefore a referral to a health psychologist may be beneficial in addressing these issues.  A cognitive-behavioral intervention focused on sleep management and stress management may be beneficial. One option for this service is through the [facility] at [email or web address] or [phone]. However, a provider located hear the patient's home may be more convenient.   4. Given the reported sleep difficulties, it is recommended that sleep hygiene be addressed to improve the quality and duration of sleep. The following are recommendations from the National Sleep Foundation that may be helpful:     Avoid napping during the day; it can disturb the normal pattern of sleep and wakefulness.    Avoid stimulants such as caffeine, nicotine, and alcohol too close to bedtime. While alcohol is well known to speed the onset of sleep, it disrupts sleep in the second half as the body begins to metabolize the alcohol, causing arousal.    Exercise can promote good sleep. Vigorous exercise should be taken in the morning or late afternoon. A relaxing exercise, like yoga, can be done before bed to help initiate a restful night's sleep.    Food can be disruptive right before sleep; stay away from large meals close to bedtime. Also dietary changes can cause sleep problems, if someone is struggling with a sleep problem, it's not a good time to start experimenting with spicy dishes. And, remember, chocolate has caffeine.    Ensure adequate exposure to natural light. This is particularly important for older people who may not venture outside as frequently as children and adults. Light  exposure helps maintain a healthy sleep-wake cycle.    Establish a regular relaxing bedtime routine. Try to avoid emotionally upsetting conversations and activities before trying to go to sleep. Don't dwell on, or bring your problems to bed.    Associate your bed with sleep. It's not a good idea to use your bed to watch TV, listen to the radio, or read.    Make sure that the sleep environment is pleasant and relaxing. The bed should be comfortable, the room should not be too hot or cold, or too bright.  5.The patient may find the following attention and organizational strategies helpful:     Use cell phone reminders to help monitor upcoming appointments and due dates as well as other important tasks (e.g., when to take medications). Set the reminder to go off several times prior to the event with advanced notice (e.g., one week before, two days before, the day before, and the morning of the event).     Attempt to reduce distractions at home. Having a clutter-free work environment and removing or at least making it harder to access potential distractions would help optimize attention. Also consider facing away from high traffic areas and using earplugs or noise cancellation headphones when desiring a quiet work environment.    Taking short breaks during the day may help optimize and maintain concentration.     Make to-do lists and prioritize tasks. Break down large tasks into smaller steps and check off each small step when completed.   6.  A referral for cognitive rehabilitation therapy, such as with a speech therapist, may be helpful to assist with compensatory strategies for the cognitive concerns and facilitate his continued cognitive recovery.  One option for this service is [facility] at [email or web address]. However, services located near his home may be a more practical option.   7. Working closely with treating  healthcare providers to develop a medically appropriate exercise program is recommended, given the mental health and physical benefits of regular exercise.  8. The results of the evaluation indicate that he generally has adequate capacity for informed personal and medical decision making. However, family assistance with complex decision making is recommended   given his difficulties with executive functioning.    9. The results of the evaluation now continue the baseline of the patient's cognitive and emotional functioning.  A referral for neuropsychological reevaluation in approximately one year is recommended to evaluate changes in cognitive functioning, and provide further recommendations and prognosis concerning a long-term baseline of his cognitive functioning.    Results and recommendations were discussed with the patient via telephone on 1/12/2023 and his questions were answered.  Thank you for referring this interesting individual. If you have any questions, please feel free to contact me.      Anthony Joseph, PhD, ABPP, LP  Professor and Licensed Psychologist [id number]  Board Certified Clinical Neuropsychologist    Time Spent:      Minutes Date Code Units   Total Time-Neuropsychologist Professional 277 1/12/23 33210 1     1/12/23 51942 4   Neuropsychologist Admin/scoring 0 1/12/23 32769 0     1/12/23 18399 0   Diagnostic Interview 16 1/12/23 86888 1   Psychometrician Time-Test Administration/Score 305 1/12/23 53655 1     1/12/23 57020 9   Diagnosis R41.3 F54

## 2023-01-11 NOTE — PATIENT INSTRUCTIONS
You were here today to practice dressing changes. At this time I DO NOT recommend that you change your own dressing.   Change your dressing every day.  Please start using betadine to clean your site. Clean the entire 4x4 area that is covered by the gauze. Chantal will send an updated order to [employer] for these supplies.  Start using silveron patch at your driveline exit site. Keep Chantal updated on how your drainage looks.  We will transition your infectious disease care to the [facility]. Please continue to see Dr. Wilson at [place] until your next follow-up in [place]. I will send your culture results to Dr. Wilson for review once they are available.   Update Chantal with any changes to your site or any other things. I can only help you if I know there is something wrong.

## 2023-01-11 NOTE — Clinical Note
Hi all, please see attached note for Dandy today. He will see Jackie and Roberto 1/12. He continues to have severe L lower rib cage pain. (I was not previously aware of how significant it is) We discussed a pain consult today but he will likely request something to get him by. He has several CT scans that can be compared to see if there is anything concerning.  Daphne - will you please do dressing change for him? His son is not a dressing changer. Please use betadine. They know we don't have any silverlon. I added on infectious labs to be drawn 1/12/22

## 2023-01-12 ENCOUNTER — LAB (OUTPATIENT)
Dept: LAB | Facility: CLINIC | Age: 63
End: 2023-01-12
Payer: COMMERCIAL

## 2023-01-12 ENCOUNTER — OFFICE VISIT (OUTPATIENT)
Dept: NEUROPSYCHOLOGY | Facility: CLINIC | Age: 63
End: 2023-01-12
Payer: COMMERCIAL

## 2023-01-12 ENCOUNTER — TELEPHONE (OUTPATIENT)
Dept: CARDIOLOGY | Facility: CLINIC | Age: 63
End: 2023-01-12

## 2023-01-12 ENCOUNTER — ANCILLARY PROCEDURE (OUTPATIENT)
Dept: CARDIOLOGY | Facility: CLINIC | Age: 63
End: 2023-01-12
Attending: INTERNAL MEDICINE
Payer: COMMERCIAL

## 2023-01-12 ENCOUNTER — ANTICOAGULATION THERAPY VISIT (OUTPATIENT)
Dept: ANTICOAGULATION | Facility: CLINIC | Age: 63
End: 2023-01-12

## 2023-01-12 VITALS
HEART RATE: 100 BPM | BODY MASS INDEX: 23.95 KG/M2 | SYSTOLIC BLOOD PRESSURE: 88 MMHG | OXYGEN SATURATION: 93 % | WEIGHT: 149 LBS | HEIGHT: 66 IN

## 2023-01-12 VITALS
SYSTOLIC BLOOD PRESSURE: 88 MMHG | BODY MASS INDEX: 23.95 KG/M2 | HEIGHT: 66 IN | WEIGHT: 149 LBS | HEART RATE: 100 BPM | OXYGEN SATURATION: 93 %

## 2023-01-12 DIAGNOSIS — I50.22 CHRONIC SYSTOLIC CONGESTIVE HEART FAILURE (H): ICD-10-CM

## 2023-01-12 DIAGNOSIS — I50.9 DECOMPENSATED HEART FAILURE (H): ICD-10-CM

## 2023-01-12 DIAGNOSIS — I50.22 CHRONIC SYSTOLIC CONGESTIVE HEART FAILURE (H): Primary | ICD-10-CM

## 2023-01-12 DIAGNOSIS — Z95.811 LVAD (LEFT VENTRICULAR ASSIST DEVICE) PRESENT (H): ICD-10-CM

## 2023-01-12 DIAGNOSIS — F54 PSYCHOLOGICAL FACTOR AFFECTING PHYSICAL CONDITION: ICD-10-CM

## 2023-01-12 DIAGNOSIS — T82.7XXA INFECTION ASSOCIATED WITH DRIVELINE OF LEFT VENTRICULAR ASSIST DEVICE (LVAD) (H): ICD-10-CM

## 2023-01-12 DIAGNOSIS — M79.2 NEUROPATHIC PAIN: ICD-10-CM

## 2023-01-12 DIAGNOSIS — R41.3 MEMORY LOSS: Primary | ICD-10-CM

## 2023-01-12 DIAGNOSIS — I50.20 HEART FAILURE WITH REDUCED EJECTION FRACTION, NYHA CLASS III (H): ICD-10-CM

## 2023-01-12 DIAGNOSIS — T82.9XXS COMPLICATION INVOLVING LEFT VENTRICULAR ASSIST DEVICE (LVAD), SEQUELA: ICD-10-CM

## 2023-01-12 DIAGNOSIS — Z95.811 LEFT VENTRICULAR ASSIST DEVICE PRESENT (H): ICD-10-CM

## 2023-01-12 LAB
6 MIN WALK (FT): 1000 FT
6 MIN WALK (M): 305 M
ALBUMIN SERPL BCG-MCNC: 3.8 G/DL (ref 3.5–5.2)
ALP SERPL-CCNC: 183 U/L (ref 40–129)
ALT SERPL W P-5'-P-CCNC: 14 U/L (ref 10–50)
ANION GAP SERPL CALCULATED.3IONS-SCNC: 10 MMOL/L (ref 7–15)
AST SERPL W P-5'-P-CCNC: 21 U/L (ref 10–50)
BASOPHILS # BLD AUTO: 0 10E3/UL (ref 0–0.2)
BASOPHILS NFR BLD AUTO: 0 %
BILIRUB SERPL-MCNC: 0.4 MG/DL
BUN SERPL-MCNC: 16.4 MG/DL (ref 8–23)
CALCIUM SERPL-MCNC: 9.6 MG/DL (ref 8.8–10.2)
CHLORIDE SERPL-SCNC: 102 MMOL/L (ref 98–107)
CREAT SERPL-MCNC: 0.8 MG/DL (ref 0.67–1.17)
CRP SERPL-MCNC: 74.9 MG/L
D DIMER PPP FEU-MCNC: 0.63 UG/ML FEU (ref 0–0.5)
DEPRECATED HCO3 PLAS-SCNC: 24 MMOL/L (ref 22–29)
EOSINOPHIL # BLD AUTO: 0.1 10E3/UL (ref 0–0.7)
EOSINOPHIL NFR BLD AUTO: 1 %
ERYTHROCYTE [DISTWIDTH] IN BLOOD BY AUTOMATED COUNT: 16.4 % (ref 10–15)
GFR SERPL CREATININE-BSD FRML MDRD: >90 ML/MIN/1.73M2
GLUCOSE SERPL-MCNC: 118 MG/DL (ref 70–99)
HCT VFR BLD AUTO: 27 % (ref 40–53)
HGB BLD-MCNC: 8.5 G/DL (ref 13.3–17.7)
IMM GRANULOCYTES # BLD: 0 10E3/UL
IMM GRANULOCYTES NFR BLD: 0 %
INR PPP: 3.22 (ref 0.85–1.15)
LDH SERPL L TO P-CCNC: 181 U/L (ref 0–250)
LYMPHOCYTES # BLD AUTO: 0.7 10E3/UL (ref 0.8–5.3)
LYMPHOCYTES NFR BLD AUTO: 10 %
MCH RBC QN AUTO: 27.1 PG (ref 26.5–33)
MCHC RBC AUTO-ENTMCNC: 31.5 G/DL (ref 31.5–36.5)
MCV RBC AUTO: 86 FL (ref 78–100)
MONOCYTES # BLD AUTO: 0.5 10E3/UL (ref 0–1.3)
MONOCYTES NFR BLD AUTO: 7 %
NEUTROPHILS # BLD AUTO: 5.4 10E3/UL (ref 1.6–8.3)
NEUTROPHILS NFR BLD AUTO: 82 %
NRBC # BLD AUTO: 0 10E3/UL
NRBC BLD AUTO-RTO: 0 /100
PLATELET # BLD AUTO: 419 10E3/UL (ref 150–450)
POTASSIUM SERPL-SCNC: 4.3 MMOL/L (ref 3.4–5.3)
PROT SERPL-MCNC: 8.7 G/DL (ref 6.4–8.3)
RBC # BLD AUTO: 3.14 10E6/UL (ref 4.4–5.9)
SODIUM SERPL-SCNC: 136 MMOL/L (ref 136–145)
VANCOMYCIN SERPL-MCNC: <4 UG/ML
WBC # BLD AUTO: 6.7 10E3/UL (ref 4–11)

## 2023-01-12 PROCEDURE — 80202 ASSAY OF VANCOMYCIN: CPT

## 2023-01-12 PROCEDURE — G0463 HOSPITAL OUTPT CLINIC VISIT: HCPCS | Mod: 25 | Performed by: INTERNAL MEDICINE

## 2023-01-12 PROCEDURE — 96138 PSYCL/NRPSYC TECH 1ST: CPT | Performed by: PSYCHOLOGIST

## 2023-01-12 PROCEDURE — 96139 PSYCL/NRPSYC TST TECH EA: CPT | Performed by: PSYCHOLOGIST

## 2023-01-12 PROCEDURE — 85610 PROTHROMBIN TIME: CPT | Performed by: PATHOLOGY

## 2023-01-12 PROCEDURE — 94618 PULMONARY STRESS TESTING: CPT | Performed by: INTERNAL MEDICINE

## 2023-01-12 PROCEDURE — 90791 PSYCH DIAGNOSTIC EVALUATION: CPT | Performed by: PSYCHOLOGIST

## 2023-01-12 PROCEDURE — 85379 FIBRIN DEGRADATION QUANT: CPT | Performed by: INTERNAL MEDICINE

## 2023-01-12 PROCEDURE — 99215 OFFICE O/P EST HI 40 MIN: CPT | Mod: 25 | Performed by: INTERNAL MEDICINE

## 2023-01-12 PROCEDURE — 96133 NRPSYC TST EVAL PHYS/QHP EA: CPT | Performed by: PSYCHOLOGIST

## 2023-01-12 PROCEDURE — 93750 INTERROGATION VAD IN PERSON: CPT | Performed by: INTERNAL MEDICINE

## 2023-01-12 PROCEDURE — 85025 COMPLETE CBC W/AUTO DIFF WBC: CPT | Performed by: PATHOLOGY

## 2023-01-12 PROCEDURE — 96132 NRPSYC TST EVAL PHYS/QHP 1ST: CPT | Performed by: PSYCHOLOGIST

## 2023-01-12 PROCEDURE — G0463 HOSPITAL OUTPT CLINIC VISIT: HCPCS | Mod: 27 | Performed by: THORACIC SURGERY (CARDIOTHORACIC VASCULAR SURGERY)

## 2023-01-12 PROCEDURE — 80053 COMPREHEN METABOLIC PANEL: CPT | Performed by: PATHOLOGY

## 2023-01-12 PROCEDURE — 93321 DOPPLER ECHO F-UP/LMTD STD: CPT | Performed by: INTERNAL MEDICINE

## 2023-01-12 PROCEDURE — 83615 LACTATE (LD) (LDH) ENZYME: CPT | Performed by: INTERNAL MEDICINE

## 2023-01-12 PROCEDURE — 93308 TTE F-UP OR LMTD: CPT | Performed by: INTERNAL MEDICINE

## 2023-01-12 PROCEDURE — 93283 PRGRMG EVAL IMPLANTABLE DFB: CPT | Performed by: INTERNAL MEDICINE

## 2023-01-12 PROCEDURE — 36415 COLL VENOUS BLD VENIPUNCTURE: CPT | Performed by: PATHOLOGY

## 2023-01-12 PROCEDURE — G0463 HOSPITAL OUTPT CLINIC VISIT: HCPCS | Mod: 25

## 2023-01-12 PROCEDURE — 93325 DOPPLER ECHO COLOR FLOW MAPG: CPT | Performed by: INTERNAL MEDICINE

## 2023-01-12 PROCEDURE — 99213 OFFICE O/P EST LOW 20 MIN: CPT | Performed by: THORACIC SURGERY (CARDIOTHORACIC VASCULAR SURGERY)

## 2023-01-12 PROCEDURE — 87040 BLOOD CULTURE FOR BACTERIA: CPT | Performed by: INTERNAL MEDICINE

## 2023-01-12 PROCEDURE — 86140 C-REACTIVE PROTEIN: CPT | Performed by: PATHOLOGY

## 2023-01-12 RX ORDER — LISINOPRIL 10 MG/1
10 TABLET ORAL 2 TIMES DAILY
Qty: 180 TABLET | Refills: 3 | Status: SHIPPED | OUTPATIENT
Start: 2023-01-12 | End: 2023-08-27

## 2023-01-12 RX ORDER — DIAZEPAM 2 MG
2 TABLET ORAL EVERY 6 HOURS PRN
Qty: 30 TABLET | Refills: 0 | Status: SHIPPED | OUTPATIENT
Start: 2023-01-12 | End: 2023-08-24

## 2023-01-12 RX ORDER — LIDOCAINE 50 MG/G
1 PATCH TOPICAL EVERY 24 HOURS
Qty: 3 PATCH | Refills: 3 | Status: ON HOLD | OUTPATIENT
Start: 2023-01-12 | End: 2023-01-13

## 2023-01-12 ASSESSMENT — PAIN SCALES - GENERAL
PAINLEVEL: MODERATE PAIN (5)
PAINLEVEL: MODERATE PAIN (5)

## 2023-01-12 NOTE — NURSING NOTE
Chief Complaint   Patient presents with     Follow Up     Return CV surgery - 6 mo post VAD           Vitals were taken and medications reconciled.     Fam Lanier, EMT   11:58 AM

## 2023-01-12 NOTE — TELEPHONE ENCOUNTER
Left voicemail to remind patient of Cardiac Cath Lab appointment on 1/13 and inform patient of updated Visitor Policy.

## 2023-01-12 NOTE — NURSING NOTE
Pt was seen for neuropsychological evaluation at the request of Dr. Sameer Lawrence for the purposes of diagnostic clarification and treatment planning. 305 minutes of test administration and scoring were provided by this writer. Please see Dr. Joseph's report for a full interpretation of the findings.    MARISELA Taylor.  Practicum Student

## 2023-01-12 NOTE — LETTER
2023      RE: Dandy Sands  [address]  [address] [address]   MR#: [id number]  : 1960  DOS: 2023      NEUROPSYCHOLOGICAL CONSULTATION      REASON FOR REFERRAL:  Dandy Sands is a 62 year old male with 10 years of education. The patient was referred for a neuropsychological re-evaluation by Dr. Lawrence to assess changes in his cognitive and emotional functioning secondary to memory concerns in the context of a history of heart disease and LVAD implantation in . The evaluation was requested in order to document his current functioning, assist with the differential diagnosis, and provide appropriate recommendations. The patient was informed that the evaluation included multiple measures of performance and symptom validity, assist with the differential diagnosis, and provide appropriate recommendations. The patient was informed that the evaluation included multiple measures of performance and symptom validity, and he was encouraged to provide his best effort at all times.  The nature of the neuropsychological evaluation was also discussed, including limits of confidentiality (for suspected child or elder abuse, potential homicide or suicide, and court orders). The patient was also informed that the report would be placed on the electronic medical records system.  The patient was also given an opportunity to ask questions. The patient indicated that he understood the information and consented to participate in the evaluation.    PROCEDURES:   Review of records and clinical interview  Mental Status-orientation, Wide Range Achievement Test-4, Wechsler Adult Intelligence Scale-IV, Kent Verbal Learning Test-Revised, Clock Drawing Test, Verbal Fluency Test, Chadwick Depression Inventory, Chadwick Anxiety Inventory, Brad Complex Figure Test, Trailmaking Test, [place] Naming Test, TOMM, Judgment of Line Orientation Test, Stroop Test, Wechsler Memory Scale-IV (selected subtests) and BDAE  Complex Ideational Material    REVIEW OF RECORDS: A message from the treatment team noted the patient  was implanted in July of this year. Your assessment pre-implant indicated some cognitive deficits, and requested a brief re-assessment after healing from surgery. His kids have recently expressed some concerns to me over his cognition - mostly forgetfulness, some mild confusion.   Records indicated that the patient had a previous neuropsychological evaluation with Dr. Villafuerte on 5/25/22 and noted  a history of CAD s/p remote PCI to LAD, ICM LVEF 30% s/p CRT-D, severe MR, APL with hx of DVT/PE on chronic anticoagulation with warfarin, hx of COVID-19 1/2022 (was hospitalized, not intubated), HTN, and HLD who presented to [facility] as a transfer from [facility] in [place] for evaluation of multivessel coronary artery disease and moderate-severe functional mitral regurgitation. He is being considered for advanced therapies for heart failure, including LVAD and transplant.  The report stated that the patient  provides a mixed understanding of his medical history and current concerns. For example, he knew he just underwent a surgery during this hospitalization, but he could not recall what the surgery was for (the nurse at bedside reminded him he just got a stent put in). He tells me that heart failure was first diagnosed when he had his heart attack around age 46. If he goes through with advanced therapies, he hopes to be able to live longer and to be able to breathe without much effort. He has not had LVAD/transplant education sessions yet, and he says he does not really have any idea of what the presurgical work-up entails. He says he has not been told anything about potential risks with the treatments.  The report concluded that  the cognitive test results are abnormal. Mr. Sands demonstrates diffuse deficits in the cognitive domains assessed today, with only a few performances reaching the lower end of  "normal/average while most are below average. There are no observed fluctuations in alertness and cognizance to suggest delirium. I think his cognitive capacity is suppressed by encephalopathy from his current ill state. I cannot rule out lingering effects from sedation in his surgery yesterday afternoon or sleep aids/anxiolytics administered last night. The cognitive data are consistent with troubles seen in the interview, where he is a bit confused and slow to recall some information. His cognitive state will likely improve as his health stabilizes.      Records from 12/12/22 noted the patient has  medical history pertinent for SLE, antiphospholipid syndrome, pulmonary embolism (on warfarin), ICM (EF 10-15%) s/p HM3 LVAD (7/28/22), s/p MDT CRT-D placement on 4/12/2018, and C. Diff. He was referred for an EP evaluation due to ICD lead failure. His ICD lead was found to have a sensing issue (low R wave amplitude) after the LVAD placement.  A CT scan of the brain report from 8/22/22 noted  No acute intracranial pathology. No acute loss of gray-white differentiation. 2. Moderate diffuse cerebral atrophy with patchy periventricular and subcortical white matter hypo-attenuation, likely sequelae of chronic small vessel ischemic disease.  Records noted the patient is being treated with Coumadin, hydralazine, amoxicillin, medicine, digoxin, aspirin, Lasix, Pepcid, Plaquenil, Flomax, Imodium, melatonin, Citrucel, hydrocortisone, atorvastatin, lisinopril, acetaminophen, Preparation H, and nasal saline.  Records from 12/22/22 indicated that the patient \"reports some issues with filling med box - needs assistance with this task. 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    CLINICAL INTERVIEW: The patient was interviewed via video platform to the [facility] ([facility]) and he was interviewed alone.  On interview, the patient " "acknowledged having memory difficulties, but he also indicated these might be age-related changes.  For example, he reported that he will misplace items at times, but eventually will find them.  He denied significant any changes in his memory since the LVAD procedure on July 8, 2022.  However, he stated that he has had frequent \"weird dreams\" since the operation.  In terms of attention/concentration, he reported that if it is not important to him he will not remember it.  He denied difficulty with making decisions, although he has second thoughts frequently.  Functionally, he reported that he drives and denied difficulty with driving.  He also denied difficulty with financial management.  In terms of medication management, he reported that because he takes 21 pills, so he began using a service through his pharmacy where the medications are placed in prepackaged containers.  He reported this has been very effective and he does not miss medication dosages.  He also denied significant problems with basic household chores.  However, he reported that he has ongoing chest pain that is being evaluated later today at a subsequent medical appointment.  He noted that this ongoing chest pain interferes with his ability to complete physical tasks.    In terms of his mood, the patient reported that he is \"90%\" in a good mood, although he acknowledged getting frustrated because the chest pain has been occurring for the past 3 months.  However, he said he is usually \"pretty .\"  He reported that his sleeping has been \"terrible\" since the LVAD procedure.  He stated that his sleep is very disrupted and he would typically only sleep 2-3 hours per night.  He reported that he naps a great deal during the day, and likely sleeps a great deal during the day.  He indicated that his appetite is about half of its usual, and he said that he eat smaller portions more frequently.  He reported that his weight is stable.  He denied any " contact with mental health professionals.  He also denied any current or previous suicidal or homicidal ideation, denied any hallucinations or delusions.  He reported he has not drank any alcohol for the past 1.5 years because of his medication regime.  He indicated that he quit tobacco use 16 years ago.  He noted that he used illicit substances in high school, but denied any recent use.    Medically, the patient reported he has generally been stable since the LVAD procedure.  However he noted that his left leg and right arm get really weak, and as noted above he has ongoing chest pain since the implantation which is being evaluated at an appointment later today.  He reported that he participates in physical therapy twice a week, but no longer participates in occupational therapy.  He reported that he contracted Covid-19 while in the hospital about 2-3 months ago.  He reported that he wears eyeglasses to read.  He also reported that his children say he needs hearing aids and he has been told by neighbors that his television is too loud.  However he indicated he currently does not have hearing aids.  He noted the medications were up-to-date in the records.    The patient confirmed that he left high school in the 11th grade.  He reported that he smoked marijuana frequently so he was an average student.  He noted that he previously worked as a  and is on disability.  The patient reported that he  in 1997 has been  one time.  He also reported that he has 2 children, a son age 37 and a daughter age 39, as well as one grandchild.  He noted that he lives at home alone with his pet cat.    BEHAVIORAL OBSERVATIONS:  On examination, the patient was casually but appropriately dressed and groomed. The patient was cooperative with the evaluation and was talkative.  Speech was normal in volume, rate and tone.  The patient also appeared to put forth their best effort on all tasks. Thus, the results of  this evaluation are a reasonably valid reflection of the patient's current level of functioning. There were no indications of hallucinations, delusions or unusual thought processes and the patient was generally well oriented to personal information, place, and time. There were no demonstrations of a practical memory problem. The patient was a good historian, with a self-report consistent with medical records. Affect was also generally appropriate.      RESULTS: Formal performance validity measures were within expected limits and consistent with the above noted observations.  Psychometric estimates of the patient's premorbid global cognitive functioning based upon single word reading ability were in the low average range, which is consistent with his educational and occupational history.    Measures of attention/concentration were broadly within expected limits. For example, a visual scanning task that integrates attention was in the average range while a digit span task was in the low average range, which is a mild improvement compared to the previous evaluation.  Speed of information processing was also slower than expected.  For example, motor-free processing speed was in the below average to low average range and also a mild improvement compared to previous evaluation.    Measures of the patient's memory functioning were also poorer than expected.  For example, immediate and delayed verbal recall on a word list learning task were in the exceptionally low range.  Verbal recognition memory on this task was also in the exceptionally low range.  On a more structured story memory test, immediate recall was in the below average range, but delayed recall was in the low average range and slightly better.  In terms of visual memory, immediate and delayed visual recall of a complex figure drawing was in the below average to low average range.  Verbal recognition memory was also in the low average range.  The patient tended  to have improved encoding of new information on verbal tasks with more structured memory tasks.  There was no evidence for rapid forgetting of previously learned verbal or visual information, however.  Further, while memory functioning was poorer than expected, there was evidence for mild improvement compared to previous evaluation.    Expressive language functioning was generally within expected limits.  For example, confrontation naming was in the average range.  Further, semantic fluency was in the average range.  Phonemic fluency was in the low average range.  This results indicated evidence for mild improvement in language functioning compared to previous evaluation.  Receptive language functioning as assessed by complex ideational material task, was in exceptionally low range, but during the interview no difficulties with receptive language were observed.  However, his performance was likely adversely impacted by difficulties other domains, such as processing speed and executive functioning.    Visual-spatial and visual constructional abilities were variable.  For example, motor-free line orientation judgment was poorer than expected.  However, on complex figure drawing and clock drawing tasks, there was no evidence for significant visual-spatial distortions.  There was no evidence for significant decline in visual-spatial abilities.    Measures of the patient's executive functioning were likewise slightly poorer than expected.  For example, a complex visual scanning task that integrates cognitive flexibility was in the low average range.  Further, a measure of response admission integrates processing speed was in the low average range.  A functional measure of health and safety awareness was at the lower end of expected limits and consistent with the previous evaluation.  Verbal abstract reasoning was at the lower end of the average range while visual reasoning was in the low average range.  These results  indicated a mild improvement compared to previous evaluation.  Further, there was no evidence for planning or organizational difficulties on complex figure drawing or clock drawing tasks.  These results indicated evidence for improvement in executive functioning compared to previous neuropsychological evaluation.    Assessment of the patient's emotional functioning was completed utilizing the clinical interview and self-report measures depression and anxiety.  On the self-report measures, the patient endorsed mild depressive and mild anxiety symptoms.  This is generally consistent with the previous neuropsychological evaluation.    SUMMARY:  In summary, the neuropsychological assessment results indicated no evidence for significant change or decline in global cognitive functioning.  In general, the results indicated evidence for mild improvement in several domains compared to the previous neuropsychological evaluation, including memory functioning, executive function, language functioning, and attention/concentration. Nonetheless, speed of information processing was mildly slowed, and he continued to have mild difficulty in encoding of new information, particularly structured task.  Executive functioning was also poorer than expected, although improved compared to previous evaluation.  The patient also endorsed mild depressive and anxiety symptoms, which is consistent the previous evaluation.  There was no evidence for significant decline in cognitive functioning.  Given the relatively recent time since his surgery, further improvement in his cognitive functioning is likely.  The pattern of results indicated the patient did not meet criteria for mild cognitive impairment (MCI) or dementia.  However, his history of cardiac difficulties is likely contributing to his cognitive difficulties.  Additionally, other issues such as sleep difficulties, possible hearing issues, low mood, and anxiety could contribute to  cognitive difficulties.    RECOMMENDATIONS:   1.  The patient strongly encouraged to continue working closely with his treating healthcare providers, given his history of LVAD placement.  2. Given his hearing difficulties, a referral for an audiology evaluation is recommended.  3.  The patient reported ongoing difficulties with disrupted sleep, therefore a referral to a health psychologist may be beneficial in addressing these issues.  A cognitive-behavioral intervention focused on sleep management and stress management may be beneficial. One option for this service is through the [facility] at [email or web address] or [phone]. However, a provider located hear the patient's home may be more convenient.   4. Given the reported sleep difficulties, it is recommended that sleep hygiene be addressed to improve the quality and duration of sleep. The following are recommendations from the National Sleep Foundation that may be helpful:     Avoid napping during the day; it can disturb the normal pattern of sleep and wakefulness.    Avoid stimulants such as caffeine, nicotine, and alcohol too close to bedtime. While alcohol is well known to speed the onset of sleep, it disrupts sleep in the second half as the body begins to metabolize the alcohol, causing arousal.    Exercise can promote good sleep. Vigorous exercise should be taken in the morning or late afternoon. A relaxing exercise, like yoga, can be done before bed to help initiate a restful night's sleep.    Food can be disruptive right before sleep; stay away from large meals close to bedtime. Also dietary changes can cause sleep problems, if someone is struggling with a sleep problem, it's not a good time to start experimenting with spicy dishes. And, remember, chocolate has caffeine.    Ensure adequate exposure to natural light. This is particularly important for older people who may not  venture outside as frequently as children and adults. Light exposure helps maintain a healthy sleep-wake cycle.    Establish a regular relaxing bedtime routine. Try to avoid emotionally upsetting conversations and activities before trying to go to sleep. Don't dwell on, or bring your problems to bed.    Associate your bed with sleep. It's not a good idea to use your bed to watch TV, listen to the radio, or read.    Make sure that the sleep environment is pleasant and relaxing. The bed should be comfortable, the room should not be too hot or cold, or too bright.  5.The patient may find the following attention and organizational strategies helpful:     Use cell phone reminders to help monitor upcoming appointments and due dates as well as other important tasks (e.g., when to take medications). Set the reminder to go off several times prior to the event with advanced notice (e.g., one week before, two days before, the day before, and the morning of the event).     Attempt to reduce distractions at home. Having a clutter-free work environment and removing or at least making it harder to access potential distractions would help optimize attention. Also consider facing away from high traffic areas and using earplugs or noise cancellation headphones when desiring a quiet work environment.    Taking short breaks during the day may help optimize and maintain concentration.     Make to-do lists and prioritize tasks. Break down large tasks into smaller steps and check off each small step when completed.   6.  A referral for cognitive rehabilitation therapy, such as with a speech therapist, may be helpful to assist with compensatory strategies for the cognitive concerns and facilitate his continued cognitive recovery.  One option for this service is [facility] at [email or web address]. However, services located near his home may be a more  practical option.   7. Working closely with treating healthcare providers to develop a medically appropriate exercise program is recommended, given the mental health and physical benefits of regular exercise.  8. The results of the evaluation indicate that he generally has adequate capacity for informed personal and medical decision making. However, family assistance with complex decision making is recommended   given his difficulties with executive functioning.    9. The results of the evaluation now continue the baseline of the patient's cognitive and emotional functioning.  A referral for neuropsychological reevaluation in approximately one year is recommended to evaluate changes in cognitive functioning, and provide further recommendations and prognosis concerning a long-term baseline of his cognitive functioning.    Results and recommendations were discussed with the patient via telephone on 1/12/2023 and his questions were answered.  Thank you for referring this interesting individual. If you have any questions, please feel free to contact me.      Anthony Joseph, PhD, ABPP, LP  Professor and Licensed Psychologist [id number]  Board Certified Clinical Neuropsychologist    Time Spent:      Minutes Date Code Units   Total Time-Neuropsychologist Professional 277 1/12/23 12953 1     1/12/23 00159 4   Neuropsychologist Admin/scoring 0 1/12/23 49530 0     1/12/23 73166 0   Diagnostic Interview 16 1/12/23 26551 1   Psychometrician Time-Test Administration/Score 305 1/12/23 66467 1     1/12/23 23474 9   Diagnosis R41.3 F54 I60.55

## 2023-01-12 NOTE — PROGRESS NOTES
"CVTS Followup  Mr. Sands is a 62yoM s/p HM3 LVAD implant.  He returns to clinic for followup after discharge to rehab.  He has done well since surgery.  He does complain of increasing left chest wall pain.    BP (!) 88/0 (BP Location: Left arm, Patient Position: Sitting, Cuff Size: Adult Regular)   Pulse 100   Ht 1.68 m (5' 6.14\")   Wt 67.6 kg (149 lb)   SpO2 93%   BMI 23.95 kg/m      In bed, comfortable, conversant  CTAB, [employer] c/d/i, driveline site c/d/i  Motor, sensation, pulses intact    A/P: 62yoM s/p HM3 implant  - Refer for anesthesia cryo nerve block  - Valium  - Lidocaine patch  - Ok to discharge from surgical service at this time  - Please call with further questions / concerns    Darius Zamora  Cardiothoracic surgery  [phone]    "

## 2023-01-12 NOTE — NURSING NOTE
Met with pt, son, Nicholas and Dr. Zamora to discuss possible I & D of driveline and pain in left chest.  Per Dr. Zamora, pt ok to use Valium, Lidocaine patches and extra strength Tylenol to medicate for pain.  Scripts written and sent to [facility].   Also recommended cryo nerve ablation.    Writer contacted anesthesia, Dr. Nguyen to arrange for soonest procedure time available. Awaiting response.  Will follow up with primary coordinator and anesthesia

## 2023-01-12 NOTE — PATIENT INSTRUCTIONS
It was a pleasure to see you in clinic today. Please do not hesitate to call with any questions or concerns. We look forward to seeing you in clinic at your next device check in 3 months.    Di Cueva, RN  Electrophysiology Nurse Clinician  [facility]  During business hours call:  [phone]  Urgent needs after hours- please call: [phone]- select option #4 and ask for job code [phone].

## 2023-01-12 NOTE — PATIENT INSTRUCTIONS
Medications:   Stop your Hydralazine   Start taking your Lisinopril 10 mg twice a day.  Its ok to start this when your current pill packets run out in a week or two, per Dr. Lora    Instructions:   We will wait for your culture results to come back in order to make an ID plan.  Chantal will follow up with you regarding this.    We will follow up with you with details regarding the cryo ablation timing    Follow-up: (make these appointments before you leave)  1. Please follow-up with VAD LAURA in June with labs prior.   2. Please follow-up with Dr. Lora in March with labs prior, as previously scheduled.        Page the VAD Coordinator on call if you gain more than 3 lb in a day or 5 in a week. Please also page if you feel unwell or have alarms.   Great to see you in clinic today. To Page the VAD Coordinator on call, dial [phone] option #4 and ask to speak to the VAD coordinator on call.

## 2023-01-12 NOTE — Clinical Note
1/12/2023      RE: Dandy Sands  [address]  [address] [address]       Dear Colleague,    Thank you for the opportunity to participate in the care of your patient, Dandy Sands, at the [facility]. Please see a copy of my visit note below.    No notes on file    Please do not hesitate to contact me if you have any questions/concerns.     Sincerely,     Darius Zamora MD

## 2023-01-12 NOTE — LETTER
1/12/2023      RE: Dandy Sands  [address]  [address] [address]       Dear Colleague,    Thank you for the opportunity to participate in the care of your patient, Dandy Sands, at the [facility] [place] [facility]. Please see a copy of my visit note below.    Cardiology Clinic Note    HPI  Mr. Dandy Sands is a pleasant 61 year old male with a past medical history of heart failure with reduced ejection fraction secondary to ischemic cardiomyopathy who underwent Heartmate 3 LVAD therapy July 28th 2022.  He has other pertinent history of lupus, antiphospholipid syndrome, pulmonary embolism (on warfarin) who presents to clinic today for follow up.    I first met him May 2022.  He has brought in for consideration of elevated risk bypass surgery, however we elected to do to a multivessel PCI.  He subsequently returned a month later in cardiogenic shock from [place], with LFTs over thousand.  June 2022 we performed a evaluation for LVAD and transplant, and we had him at one point listed for transplant, however due to his elevated antibody levels associated with his lupus, we are unable to get a adequate donor offer, thus we proceeded with LVAD therapy as probable destination given his amount of antibodies.  His LVAD surgery was complicated by severe right ventricular failure requiring temporary RVAD.  He also had severe delirium and at one point tearing out his bridled nasogastric tube, causing a nasal septal perforation and severe nosebleeding.  He was discharged to rehab and then we presented with severe C. difficile diarrhea, dehydration, low flow alarms, as well as COVID-19 infection.  I had seen him one time in clinic 10/7/22, but he has had several admissions that have not necessarily been LVAD related.    He has been back home in [place] since October 2022.  He presents with his son.  He did visit the ER for abdominal  and chest pain and has been on IV antibiotics for driveline infection.  He has not had many more low flow alarms.  No more epistaxis nor dyspnea.  He has chest wall pain and is getting a nerve block next week.    PAST MEDICAL HISTORY:  Patient Active Problem List   Diagnosis     Decompensated heart failure (H)     Cardiogenic shock (H)     LVAD (left ventricular assist device) present (H)     Chronic systolic congestive heart failure (H)     Heart failure with reduced ejection fraction, NYHA class III (H)     Lightheadedness     Recurrent epistaxis     Epistaxis     Complication involving left ventricular assist device (LVAD)     Acute diarrhea     C. difficile diarrhea     Left ventricular assist device present (H)     Chills     COVID     Nausea and vomiting, unspecified vomiting type     Fever and chills     External hemorrhoids        FAMILY HISTORY:  Father with hypertension, mother with coronary disease requiring a 5 vessel CABG, sister with alcohol and drug use    SOCIAL HISTORY:  Former smoker, quit in 2009 after 25 pack years  Very rare alcohol use    ALLERGIES:  No Known Allergies    CURRENT MEDICATIONS:  Current Outpatient Medications   Medication Sig Dispense Refill     acetaminophen (TYLENOL) 325 MG tablet Take 3 tablets (975 mg) by mouth every 8 hours as needed for mild pain 270 tablet 0     amoxicillin (AMOXIL) 500 MG capsule Take 4 capsules (2,000 mg) by mouth as needed (take 1 hour prior to any dental procedures) 4 capsule 3     aspirin (ASA) 81 MG EC tablet Take 1 tablet (81 mg) by mouth daily       atorvastatin (LIPITOR) 40 MG tablet Take 1 tablet (40 mg) by mouth daily 30 tablet 0     diazepam (VALIUM) 2 MG tablet Take 1 tablet (2 mg) by mouth every 6 hours as needed for anxiety 30 tablet 0     digoxin (LANOXIN) 125 MCG tablet Take 1 tablet (125 mcg) by mouth daily 90 tablet 3     famotidine (PEPCID) 20 MG tablet Take 1 tablet (20 mg) by mouth 2 times daily 60 tablet 11     furosemide (LASIX) 20  MG tablet Take 1 tablet (20 mg) by mouth daily as needed (For weight gain or shortness of breath) 30 tablet 0     hydrALAZINE (APRESOLINE) 25 MG tablet Take 1.5 tablets (37.5 mg) by mouth 3 times daily 270 tablet 3     hydrocortisone 1 % CREA cream Place 1 g rectally daily as needed for itching 1 g 0     hydroxychloroquine (PLAQUENIL) 200 MG tablet Take 1 tablet (200 mg) by mouth 2 times daily 60 tablet 4     lidocaine (LIDODERM) 5 % patch Place 1 patch onto the skin every 24 hours To prevent lidocaine toxicity, patient should be patch free for 12 hrs daily. 3 patch 3     lisinopril (ZESTRIL) 10 MG tablet Take 1 tablet (10 mg) by mouth daily 30 tablet 0     loperamide (IMODIUM) 2 MG capsule Take 1 capsule (2 mg) by mouth 2 times daily as needed for diarrhea 30 capsule 0     melatonin 5 MG tablet Take 1 tablet (5 mg) by mouth At Bedtime 30 tablet 0     methylcellulose (CITRUCEL) 500 MG TABS tablet Take 2 tablets (1,000 mg) by mouth 2 times daily 60 tablet 0     minocycline (MINOCIN) 100 MG capsule Take 1 capsule by mouth 2 times daily (before meals) (Patient not taking: Reported on 1/12/2023)       multivitamin w/minerals (THERA-VIT-M) tablet Take 1 tablet by mouth daily  0     pramox-pe-glycerin-petrolatum (PREPARATION H) 1-0.25-14.4-15 % CREA cream Place rectally 2 times daily as needed for hemorrhoids Apply immediately after a bowel movement. 51 g 0     saline nasal (AYR SALINE) GEL topical gel Apply 1 applicator into each nare nightly as needed for congestion (Patient not taking: Reported on 1/12/2023)       tamsulosin (FLOMAX) 0.4 MG capsule Take 1 capsule (0.4 mg) by mouth daily 30 capsule 0     warfarin ANTICOAGULANT (COUMADIN) 2.5 MG tablet Take 5mg on Sunday, Tuesday, Thursday and 7.5mg all other days, or as directed by anticoagulation clinic 192 tablet 3       ROS:   10-point ROS negative except for H&P    EXAM:  BP (!) 88/0 (BP Location: Left arm, Patient Position: Sitting, Cuff Size: Adult Regular)    "Pulse 100   Ht 1.68 m (5' 6.14\")   Wt 67.6 kg (149 lb)   SpO2 93%   BMI 23.95 kg/m      General: appears comfortable, alert and articulate  Heart: LVAD hum, JVP 7 cm  Lungs: clear, no rales or wheezing  Abdomen: soft  Extremities: no edema  Neurological: normal speech and affect, no gross motor deficits    Weight  Wt Readings from Last 10 Encounters:   01/12/23 67.6 kg (149 lb)   01/12/23 67.6 kg (149 lb)   12/12/22 68.7 kg (151 lb 8 oz)   12/12/22 68.8 kg (151 lb 9.6 oz)   11/02/22 62.6 kg (137 lb 14.4 oz)   10/27/22 64.7 kg (142 lb 9.6 oz)   10/25/22 65.8 kg (145 lb)   10/18/22 60.8 kg (134 lb 1.6 oz)   10/07/22 63.6 kg (140 lb 3.2 oz)   09/29/22 60.8 kg (134 lb 0.6 oz)       Labs:  Reviewed    Testing/Procedures:  I personally visualized and interpreted:    Echocardiogram 1/12/23: stable from prior, no changes    TTE 9/23/22:  Interpretation Summary  HM3 LVAD is present but not well seen on today's study.     Left ventricular chamber size is small, LVIDd = 3.9 cm  Left ventricular function is decreased. The ejection fraction is 15-20% (severely reduced).  Global right ventricular function is normal.  The aortic valve remains closed during the cardiac cycle. There is mild continuous aortic regurgitation.  No pericardial effusion is present.     LV cavity size is smaller with cavity obliteration in standing position LVIDd = 3.0 cm    EKG 9/20/22:  Sinus with PVCs, RBBB    RHC 9/23/22:  RA 10/11/8 mmHg  RV 19/8 mmHg  PA 18/10/13 mmHg  CWP 5/4/4 mmHg  CO (Marley) 3.7 L/min  CI (Marley) 2.2 L/min/m2  CO (TD) 3.27 L/min  CI (TD) 1.94 L/min/m2    MAP: 62  SVR: 1321 (using TD CO)  PVR 2.8 KNOWLES (using TD CO)    Due to low filling pressures and complaints of low flow alarms patient was given 1 Liter of saline bolus to reassess filling pressures.    RA 17/17/13 mmHg  RV 21/10 mmHg  PA 21/13/16 mmHg  CWP 11/10/9 mmHg  CO (Marley) 3.4 L/min  CI (Marley) 2.01 L/min/m2    MAP 62 mmHg   SVR 1153 dynes (using Marley CO)  PVR 2.1 KNOWLES " (using Marley CO)     Assessment and Plan:   In summary, Mr. Dandy Sands is a pleasant 61 year old male with a past medical history of SLE, antiphospholipid syndrome, pulmonary embolism (on warfarin), ICM (EF 10-15%) s/p HM3 LVAD (7/28/22), and C. Diff who presents to clinic today for follow up.    Chronic systolic heart failure/HFrEF (EF 10-15%) secondary to ischemic cardiomyopathy  NYHA Symptom Class II  Stage D  ACE-I/ARB/ARNi: Lisinopril 10 mg daily, increase to BID to come off hydralazine  Not on beta-blocker due to severe RV failure, hypotension, low flows  SGLT2i and Aldosterone antagonist has not been started yet, due to prior low flows and increasing lisinopril now  Digoxin 125 mcg daily  SCD prophylaxis is an ICD  Fluid status euvolemic currently  Diuretic: lasix as needed  Cardiac Rehab: He has been doing  has low flow controller  Warfarin to maintain INR goal of 2-3, aspirin 81 mg daily    Heartmate 3 LEFT VS  Flow (Lpm): 3.4 Lpm  Pulse Index (PI): 5.5 PI  Speed (rpm): 5100 rpm  Power (persaud): 3.7 persaud  No LVAD alarms, rare PI events on interrogation    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    He was initially listed for transplant but due to high cPRA, was not getting compatible offers.  I think this is due to his lupus and autoimmune diseases.  His family asked about transplant status.  Can recheck cPRA but honestly will be a long shot in my opinion.  We have looked at desensitization protocols but patient has had recurrent infections since his LVAD, which will worsen with the inpatient protocol.    Follow up in 4 months    The patient states understanding and is agreeable with plan.   Feel free to contact myself regarding questions or concerns.  It was a pleasure to see this patient today.    Kelly Lora MD   of Medicine  Advanced Heart Failure and Transplant Cardiology    High complexity patient  40 minutes of time  spent

## 2023-01-12 NOTE — NURSING NOTE
01/12/23 1400   Heartmate 3 LEFT VS   Flow (Lpm) 3.4 Lpm   Pulse Index (PI) 5.5 PI   Speed (rpm) 5100 rpm   Power (persaud) 3.7 persaud   Primary Controller   Serial number [id number]   EBB: Patient use 5   Replace in 26 Months   Backup Controller   Serial number [id number]   EBB: Patient use 8   Replace EBB in 26 Months   VAD Interrogation   Alarms reported by patient N   Unexpected alarms noted upon interrogation None   PI events Occasional  (PI range 2.4-7.3, mostly 4's. No speed drops)   Damage to equipment is noted N   Action taken Reviewed proper equipment care and maintenance   Driveline Exit Site   Dressing change done Y   Driveline properly secured Yes   DLES assessment c/d/i per pt report  (quarter sized area of skin irritation noted. 2x2 added)   Dressing used Daily kit   Frequency patient changes dressing Daily     Education Complete: Yes   Charge the BACKUP controller s backup battery every 6 months  Perform a self test on BACKUP every 6 months  Change the MPU s batteries every 6 months:Yes  Have equipment serviced yearly (if applicable):Yes

## 2023-01-12 NOTE — PROGRESS NOTES
"Name Dandy Sands  [address]Y [address]    MRN [id number]  Provider FEROZ     [birth year]  Tech AS    Age 62  Station OP    Sex Male  Visit Type CSC    Education 10  Platform FV    Handedness Right               ORIENTATION       Time  0      Personal Information      Place       Presidents               TOMM        Trial 1 49       Trial 2 -       Trial 3 -                                                               WAIS-IV          Raw SS RDS    Digit Span  20 8 8            Matrix Reasoning 10 7     Similarities  22 8                     BOSTON NAMING TEST       Raw 52       SS 8 %ile 19-28% (MOANS)    T 46 (CONOR) 0     Total Stim. Correct 1      Total Phon. Correct 3              COWAT        Form FAS       Raw 26       SS 7 (BOTH CONOR & MOANS)    T 42 (CONOR)      %ile 11-18% (MOANS)              ANIMAL FLUENCY       Raw 18       SS 9 z 0     T 50               COMPLEX IDEATIONAL MATERIAL      Raw  9      SS  5      T  29      Time to Copy 0 0                                             TRAIL MAKING TEST        Time Errors SSa T    A 39\" 0 7 45    B 191\" 0 5 36            STROOP         Raw T      Word 78 39      Color 51 34      C/W 18 36              HAYLEE-O COMPLEX FIGURE         Raw T %ile    Time to Copy 153\"  >16%    Copy  31  11-16%    Short Delay Recall 10 36 8%    Long Delay Recall 11.5 39 14%    Recognition Total 18 37 10%            CLOCK DRAWING       Command  Borderline      Copy  Normal                                                      RBANS          Raw z SS/%ile    Line Orientation 11 -1.93 3-9%    Story Immediate 11 -2.11 4    Story Delay  6 -1.57 7            HVLT          Raw T     Trial 1  4      Trial 2  8      Trial 3  5      Learning  4      Total Recall  17 28     Delayed Recall 4 21     Percent Retention 50 ?20     True Positives 9      False Positives 2      Discrimination Index 7 ?20             ILS HEALTH & SAFETY QUESTIONNAIRE      Total  32      Classification Moderate       "        PHQ-9        Total  6      Interpretation Mild              MIGUEL-7        Total  6      Interpretation Mild

## 2023-01-12 NOTE — PROGRESS NOTES
Cardiology Clinic Note    HPI  Mr. Dandy Sands is a pleasant 61 year old male with a past medical history of heart failure with reduced ejection fraction secondary to ischemic cardiomyopathy who underwent Heartmate 3 LVAD therapy July 28th 2022.  He has other pertinent history of lupus, antiphospholipid syndrome, pulmonary embolism (on warfarin) who presents to clinic today for follow up.    I first met him May 2022.  He has brought in for consideration of elevated risk bypass surgery, however we elected to do to a multivessel PCI.  He subsequently returned a month later in cardiogenic shock from [place], with LFTs over thousand.  June 2022 we performed a evaluation for LVAD and transplant, and we had him at one point listed for transplant, however due to his elevated antibody levels associated with his lupus, we are unable to get a adequate donor offer, thus we proceeded with LVAD therapy as probable destination given his amount of antibodies.  His LVAD surgery was complicated by severe right ventricular failure requiring temporary RVAD.  He also had severe delirium and at one point tearing out his bridled nasogastric tube, causing a nasal septal perforation and severe nosebleeding.  He was discharged to rehab and then we presented with severe C. difficile diarrhea, dehydration, low flow alarms, as well as COVID-19 infection.  I had seen him one time in clinic 10/7/22, but he has had several admissions that have not necessarily been LVAD related.    He has been back home in [place] since October 2022.  He presents with his son.  He did visit the ER for abdominal and chest pain and has been on IV antibiotics for driveline infection.  He has not had many more low flow alarms.  No more epistaxis nor dyspnea.  He has chest wall pain and is getting a nerve block next week.    PAST MEDICAL HISTORY:  Patient Active Problem List   Diagnosis     Decompensated heart failure (H)     Cardiogenic shock (H)     LVAD  (left ventricular assist device) present (H)     Chronic systolic congestive heart failure (H)     Heart failure with reduced ejection fraction, NYHA class III (H)     Lightheadedness     Recurrent epistaxis     Epistaxis     Complication involving left ventricular assist device (LVAD)     Acute diarrhea     C. difficile diarrhea     Left ventricular assist device present (H)     Chills     COVID     Nausea and vomiting, unspecified vomiting type     Fever and chills     External hemorrhoids        FAMILY HISTORY:  Father with hypertension, mother with coronary disease requiring a 5 vessel CABG, sister with alcohol and drug use    SOCIAL HISTORY:  Former smoker, quit in 2009 after 25 pack years  Very rare alcohol use    ALLERGIES:  No Known Allergies    CURRENT MEDICATIONS:  Current Outpatient Medications   Medication Sig Dispense Refill     acetaminophen (TYLENOL) 325 MG tablet Take 3 tablets (975 mg) by mouth every 8 hours as needed for mild pain 270 tablet 0     amoxicillin (AMOXIL) 500 MG capsule Take 4 capsules (2,000 mg) by mouth as needed (take 1 hour prior to any dental procedures) 4 capsule 3     aspirin (ASA) 81 MG EC tablet Take 1 tablet (81 mg) by mouth daily       atorvastatin (LIPITOR) 40 MG tablet Take 1 tablet (40 mg) by mouth daily 30 tablet 0     diazepam (VALIUM) 2 MG tablet Take 1 tablet (2 mg) by mouth every 6 hours as needed for anxiety 30 tablet 0     digoxin (LANOXIN) 125 MCG tablet Take 1 tablet (125 mcg) by mouth daily 90 tablet 3     famotidine (PEPCID) 20 MG tablet Take 1 tablet (20 mg) by mouth 2 times daily 60 tablet 11     furosemide (LASIX) 20 MG tablet Take 1 tablet (20 mg) by mouth daily as needed (For weight gain or shortness of breath) 30 tablet 0     hydrALAZINE (APRESOLINE) 25 MG tablet Take 1.5 tablets (37.5 mg) by mouth 3 times daily 270 tablet 3     hydrocortisone 1 % CREA cream Place 1 g rectally daily as needed for itching 1 g 0     hydroxychloroquine (PLAQUENIL) 200 MG  "tablet Take 1 tablet (200 mg) by mouth 2 times daily 60 tablet 4     lidocaine (LIDODERM) 5 % patch Place 1 patch onto the skin every 24 hours To prevent lidocaine toxicity, patient should be patch free for 12 hrs daily. 3 patch 3     lisinopril (ZESTRIL) 10 MG tablet Take 1 tablet (10 mg) by mouth daily 30 tablet 0     loperamide (IMODIUM) 2 MG capsule Take 1 capsule (2 mg) by mouth 2 times daily as needed for diarrhea 30 capsule 0     melatonin 5 MG tablet Take 1 tablet (5 mg) by mouth At Bedtime 30 tablet 0     methylcellulose (CITRUCEL) 500 MG TABS tablet Take 2 tablets (1,000 mg) by mouth 2 times daily 60 tablet 0     minocycline (MINOCIN) 100 MG capsule Take 1 capsule by mouth 2 times daily (before meals) (Patient not taking: Reported on 1/12/2023)       multivitamin w/minerals (THERA-VIT-M) tablet Take 1 tablet by mouth daily  0     pramox-pe-glycerin-petrolatum (PREPARATION H) 1-0.25-14.4-15 % CREA cream Place rectally 2 times daily as needed for hemorrhoids Apply immediately after a bowel movement. 51 g 0     saline nasal (AYR SALINE) GEL topical gel Apply 1 applicator into each nare nightly as needed for congestion (Patient not taking: Reported on 1/12/2023)       tamsulosin (FLOMAX) 0.4 MG capsule Take 1 capsule (0.4 mg) by mouth daily 30 capsule 0     warfarin ANTICOAGULANT (COUMADIN) 2.5 MG tablet Take 5mg on Sunday, Tuesday, Thursday and 7.5mg all other days, or as directed by anticoagulation clinic 192 tablet 3       ROS:   10-point ROS negative except for H&P    EXAM:  BP (!) 88/0 (BP Location: Left arm, Patient Position: Sitting, Cuff Size: Adult Regular)   Pulse 100   Ht 1.68 m (5' 6.14\")   Wt 67.6 kg (149 lb)   SpO2 93%   BMI 23.95 kg/m      General: appears comfortable, alert and articulate  Heart: LVAD hum, JVP 7 cm  Lungs: clear, no rales or wheezing  Abdomen: soft  Extremities: no edema  Neurological: normal speech and affect, no gross motor deficits    Weight  Wt Readings from Last 10 " Encounters:   01/12/23 67.6 kg (149 lb)   01/12/23 67.6 kg (149 lb)   12/12/22 68.7 kg (151 lb 8 oz)   12/12/22 68.8 kg (151 lb 9.6 oz)   11/02/22 62.6 kg (137 lb 14.4 oz)   10/27/22 64.7 kg (142 lb 9.6 oz)   10/25/22 65.8 kg (145 lb)   10/18/22 60.8 kg (134 lb 1.6 oz)   10/07/22 63.6 kg (140 lb 3.2 oz)   09/29/22 60.8 kg (134 lb 0.6 oz)       Labs:  Reviewed    Testing/Procedures:  I personally visualized and interpreted:    Echocardiogram 1/12/23: stable from prior, no changes    TTE 9/23/22:  Interpretation Summary  HM3 LVAD is present but not well seen on today's study.     Left ventricular chamber size is small, LVIDd = 3.9 cm  Left ventricular function is decreased. The ejection fraction is 15-20% (severely reduced).  Global right ventricular function is normal.  The aortic valve remains closed during the cardiac cycle. There is mild continuous aortic regurgitation.  No pericardial effusion is present.     LV cavity size is smaller with cavity obliteration in standing position LVIDd = 3.0 cm    EKG 9/20/22:  Sinus with PVCs, RBBB    RHC 9/23/22:  RA 10/11/8 mmHg  RV 19/8 mmHg  PA 18/10/13 mmHg  CWP 5/4/4 mmHg  CO (Marley) 3.7 L/min  CI (Marley) 2.2 L/min/m2  CO (TD) 3.27 L/min  CI (TD) 1.94 L/min/m2    MAP: 62  SVR: 1321 (using TD CO)  PVR 2.8 KNOWLES (using TD CO)    Due to low filling pressures and complaints of low flow alarms patient was given 1 Liter of saline bolus to reassess filling pressures.    RA 17/17/13 mmHg  RV 21/10 mmHg  PA 21/13/16 mmHg  CWP 11/10/9 mmHg  CO (Marley) 3.4 L/min  CI (Marley) 2.01 L/min/m2    MAP 62 mmHg   SVR 1153 dynes (using Marley CO)  PVR 2.1 KNOWLES (using Marley CO)     Assessment and Plan:   In summary, Mr. Dandy Sands is a pleasant 61 year old male with a past medical history of SLE, antiphospholipid syndrome, pulmonary embolism (on warfarin), ICM (EF 10-15%) s/p HM3 LVAD (7/28/22), and C. Diff who presents to clinic today for follow up.    Chronic systolic heart failure/HFrEF (EF 10-15%)  secondary to ischemic cardiomyopathy  NYHA Symptom Class II  Stage D  ACE-I/ARB/ARNi: Lisinopril 10 mg daily, increase to BID to come off hydralazine  Not on beta-blocker due to severe RV failure, hypotension, low flows  SGLT2i and Aldosterone antagonist has not been started yet, due to prior low flows and increasing lisinopril now  Digoxin 125 mcg daily  SCD prophylaxis is an ICD  Fluid status euvolemic currently  Diuretic: lasix as needed  Cardiac Rehab: He has been doing  has low flow controller  Warfarin to maintain INR goal of 2-3, aspirin 81 mg daily    Heartmate 3 LEFT VS  Flow (Lpm): 3.4 Lpm  Pulse Index (PI): 5.5 PI  Speed (rpm): 5100 rpm  Power (persaud): 3.7 persaud  No LVAD alarms, rare PI events on interrogation    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    He was initially listed for transplant but due to high cPRA, was not getting compatible offers.  I think this is due to his lupus and autoimmune diseases.  His family asked about transplant status.  Can recheck cPRA but honestly will be a long shot in my opinion.  We have looked at desensitization protocols but patient has had recurrent infections since his LVAD, which will worsen with the inpatient protocol.    Follow up in 4 months    The patient states understanding and is agreeable with plan.   Feel free to contact myself regarding questions or concerns.  It was a pleasure to see this patient today.    Kelly Lora MD   of Medicine  Advanced Heart Failure and Transplant Cardiology    High complexity patient  40 minutes of time spent

## 2023-01-13 ENCOUNTER — CARE COORDINATION (OUTPATIENT)
Dept: CARDIOLOGY | Facility: CLINIC | Age: 63
End: 2023-01-13

## 2023-01-13 ENCOUNTER — HOSPITAL ENCOUNTER (OUTPATIENT)
Facility: CLINIC | Age: 63
Discharge: HOME | End: 2023-01-13
Attending: INTERNAL MEDICINE | Admitting: INTERNAL MEDICINE
Payer: COMMERCIAL

## 2023-01-13 ENCOUNTER — APPOINTMENT (OUTPATIENT)
Dept: MEDSURG UNIT | Facility: CLINIC | Age: 63
End: 2023-01-13
Attending: INTERNAL MEDICINE
Payer: COMMERCIAL

## 2023-01-13 ENCOUNTER — APPOINTMENT (OUTPATIENT)
Dept: GENERAL RADIOLOGY | Facility: CLINIC | Age: 63
End: 2023-01-13
Payer: COMMERCIAL

## 2023-01-13 ENCOUNTER — ANCILLARY PROCEDURE (OUTPATIENT)
Dept: CARDIOLOGY | Facility: CLINIC | Age: 63
End: 2023-01-13
Payer: COMMERCIAL

## 2023-01-13 ENCOUNTER — APPOINTMENT (OUTPATIENT)
Dept: LAB | Facility: CLINIC | Age: 63
End: 2023-01-13
Attending: INTERNAL MEDICINE
Payer: COMMERCIAL

## 2023-01-13 VITALS
DIASTOLIC BLOOD PRESSURE: 44 MMHG | WEIGHT: 142.7 LBS | RESPIRATION RATE: 16 BRPM | SYSTOLIC BLOOD PRESSURE: 100 MMHG | TEMPERATURE: 97.7 F | HEIGHT: 67 IN | HEART RATE: 85 BPM | BODY MASS INDEX: 22.4 KG/M2 | OXYGEN SATURATION: 99 %

## 2023-01-13 DIAGNOSIS — T82.110A ICD (IMPLANTABLE CARDIOVERTER-DEFIBRILLATOR) LEAD FAILURE: ICD-10-CM

## 2023-01-13 DIAGNOSIS — T82.110D ICD (IMPLANTABLE CARDIOVERTER-DEFIBRILLATOR) LEAD FAILURE, SUBSEQUENT ENCOUNTER: Primary | ICD-10-CM

## 2023-01-13 DIAGNOSIS — T82.110D ICD (IMPLANTABLE CARDIOVERTER-DEFIBRILLATOR) LEAD FAILURE, SUBSEQUENT ENCOUNTER: ICD-10-CM

## 2023-01-13 DIAGNOSIS — T82.110A FAILURE OF IMPLANTABLE CARDIOVERTER-DEFIBRILLATOR (ICD) LEAD, INITIAL ENCOUNTER: ICD-10-CM

## 2023-01-13 LAB
ANION GAP SERPL CALCULATED.3IONS-SCNC: 14 MMOL/L (ref 7–15)
BACTERIA WND CULT: NO GROWTH
BUN SERPL-MCNC: 15.3 MG/DL (ref 8–23)
CALCIUM SERPL-MCNC: 9.6 MG/DL (ref 8.8–10.2)
CHLORIDE SERPL-SCNC: 102 MMOL/L (ref 98–107)
CREAT SERPL-MCNC: 0.85 MG/DL (ref 0.67–1.17)
DEPRECATED HCO3 PLAS-SCNC: 21 MMOL/L (ref 22–29)
ERYTHROCYTE [DISTWIDTH] IN BLOOD BY AUTOMATED COUNT: 16.4 % (ref 10–15)
FIO2-PRE: 21 %
GFR SERPL CREATININE-BSD FRML MDRD: >90 ML/MIN/1.73M2
GLUCOSE SERPL-MCNC: 118 MG/DL (ref 70–99)
GRAM STAIN RESULT: NORMAL
GRAM STAIN RESULT: NORMAL
HCT VFR BLD AUTO: 29.6 % (ref 40–53)
HGB BLD-MCNC: 8.9 G/DL (ref 13.3–17.7)
INR PPP: 3.27 (ref 0.85–1.15)
MCH RBC QN AUTO: 26.6 PG (ref 26.5–33)
MCHC RBC AUTO-ENTMCNC: 30.1 G/DL (ref 31.5–36.5)
MCV RBC AUTO: 89 FL (ref 78–100)
MDC_IDC_LEAD_IMPLANT_DT: NORMAL
MDC_IDC_LEAD_LOCATION: NORMAL
MDC_IDC_LEAD_LOCATION_DETAIL_1: NORMAL
MDC_IDC_LEAD_MFG: NORMAL
MDC_IDC_LEAD_MODEL: NORMAL
MDC_IDC_LEAD_POLARITY_TYPE: NORMAL
MDC_IDC_LEAD_SERIAL: NORMAL
MDC_IDC_LEAD_SPECIAL_FUNCTION: NORMAL
MDC_IDC_MSMT_BATTERY_DTM: NORMAL
MDC_IDC_MSMT_BATTERY_REMAINING_LONGEVITY: 51 MO
MDC_IDC_MSMT_BATTERY_RRT_TRIGGER: 2.73
MDC_IDC_MSMT_BATTERY_STATUS: NORMAL
MDC_IDC_MSMT_BATTERY_VOLTAGE: 2.89 V
MDC_IDC_MSMT_CAP_CHARGE_DTM: NORMAL
MDC_IDC_MSMT_CAP_CHARGE_ENERGY: 18 J
MDC_IDC_MSMT_CAP_CHARGE_TIME: 3.99
MDC_IDC_MSMT_CAP_CHARGE_TYPE: NORMAL
MDC_IDC_MSMT_LEADCHNL_RA_IMPEDANCE_VALUE: 342 OHM
MDC_IDC_MSMT_LEADCHNL_RA_PACING_THRESHOLD_AMPLITUDE: 0.5 V
MDC_IDC_MSMT_LEADCHNL_RA_PACING_THRESHOLD_PULSEWIDTH: 0.4 MS
MDC_IDC_MSMT_LEADCHNL_RA_SENSING_INTR_AMPL: 1 MV
MDC_IDC_MSMT_LEADCHNL_RV_IMPEDANCE_VALUE: 475 OHM
MDC_IDC_MSMT_LEADCHNL_RV_PACING_THRESHOLD_AMPLITUDE: 0.5 V
MDC_IDC_MSMT_LEADCHNL_RV_PACING_THRESHOLD_PULSEWIDTH: 0.4 MS
MDC_IDC_MSMT_LEADCHNL_RV_SENSING_INTR_AMPL: 4.3 MV
MDC_IDC_PG_IMPLANT_DTM: NORMAL
MDC_IDC_PG_MFG: NORMAL
MDC_IDC_PG_MODEL: NORMAL
MDC_IDC_PG_SERIAL: NORMAL
MDC_IDC_PG_TYPE: NORMAL
MDC_IDC_SESS_CLINIC_NAME: NORMAL
MDC_IDC_SESS_DTM: NORMAL
MDC_IDC_SESS_TYPE: NORMAL
MDC_IDC_SET_BRADY_AT_MODE_SWITCH_RATE: 171 {BEATS}/MIN
MDC_IDC_SET_BRADY_HYSTRATE: NORMAL
MDC_IDC_SET_BRADY_LOWRATE: 50 {BEATS}/MIN
MDC_IDC_SET_BRADY_MAX_SENSOR_RATE: 115 {BEATS}/MIN
MDC_IDC_SET_BRADY_MAX_TRACKING_RATE: 130 {BEATS}/MIN
MDC_IDC_SET_BRADY_MODE: NORMAL
MDC_IDC_SET_BRADY_PAV_DELAY_LOW: 350 MS
MDC_IDC_SET_BRADY_SAV_DELAY_LOW: 350 MS
MDC_IDC_SET_LEADCHNL_RA_PACING_AMPLITUDE: 1.5 V
MDC_IDC_SET_LEADCHNL_RA_PACING_ANODE_ELECTRODE_1: NORMAL
MDC_IDC_SET_LEADCHNL_RA_PACING_ANODE_LOCATION_1: NORMAL
MDC_IDC_SET_LEADCHNL_RA_PACING_CAPTURE_MODE: NORMAL
MDC_IDC_SET_LEADCHNL_RA_PACING_CATHODE_ELECTRODE_1: NORMAL
MDC_IDC_SET_LEADCHNL_RA_PACING_CATHODE_LOCATION_1: NORMAL
MDC_IDC_SET_LEADCHNL_RA_PACING_POLARITY: NORMAL
MDC_IDC_SET_LEADCHNL_RA_PACING_PULSEWIDTH: 0.4 MS
MDC_IDC_SET_LEADCHNL_RA_SENSING_ANODE_ELECTRODE_1: NORMAL
MDC_IDC_SET_LEADCHNL_RA_SENSING_ANODE_LOCATION_1: NORMAL
MDC_IDC_SET_LEADCHNL_RA_SENSING_CATHODE_ELECTRODE_1: NORMAL
MDC_IDC_SET_LEADCHNL_RA_SENSING_CATHODE_LOCATION_1: NORMAL
MDC_IDC_SET_LEADCHNL_RA_SENSING_POLARITY: NORMAL
MDC_IDC_SET_LEADCHNL_RA_SENSING_SENSITIVITY: 0.15 MV
MDC_IDC_SET_LEADCHNL_RV_PACING_AMPLITUDE: 3.5 V
MDC_IDC_SET_LEADCHNL_RV_PACING_ANODE_ELECTRODE_1: NORMAL
MDC_IDC_SET_LEADCHNL_RV_PACING_ANODE_LOCATION_1: NORMAL
MDC_IDC_SET_LEADCHNL_RV_PACING_CAPTURE_MODE: NORMAL
MDC_IDC_SET_LEADCHNL_RV_PACING_CATHODE_ELECTRODE_1: NORMAL
MDC_IDC_SET_LEADCHNL_RV_PACING_CATHODE_LOCATION_1: NORMAL
MDC_IDC_SET_LEADCHNL_RV_PACING_POLARITY: NORMAL
MDC_IDC_SET_LEADCHNL_RV_PACING_PULSEWIDTH: 0.4 MS
MDC_IDC_SET_LEADCHNL_RV_SENSING_ANODE_ELECTRODE_1: NORMAL
MDC_IDC_SET_LEADCHNL_RV_SENSING_ANODE_LOCATION_1: NORMAL
MDC_IDC_SET_LEADCHNL_RV_SENSING_CATHODE_ELECTRODE_1: NORMAL
MDC_IDC_SET_LEADCHNL_RV_SENSING_CATHODE_LOCATION_1: NORMAL
MDC_IDC_SET_LEADCHNL_RV_SENSING_POLARITY: NORMAL
MDC_IDC_SET_LEADCHNL_RV_SENSING_SENSITIVITY: 0.3 MV
MDC_IDC_SET_ZONE_DETECTION_BEATS_DENOMINATOR: 40 {BEATS}
MDC_IDC_SET_ZONE_DETECTION_BEATS_NUMERATOR: 30 {BEATS}
MDC_IDC_SET_ZONE_DETECTION_INTERVAL: 280 MS
MDC_IDC_SET_ZONE_DETECTION_INTERVAL: 320 MS
MDC_IDC_SET_ZONE_DETECTION_INTERVAL: 350 MS
MDC_IDC_SET_ZONE_DETECTION_INTERVAL: 350 MS
MDC_IDC_SET_ZONE_DETECTION_INTERVAL: 400 MS
MDC_IDC_SET_ZONE_TYPE: NORMAL
MDC_IDC_STAT_AT_BURDEN_PERCENT: 0.1 %
MDC_IDC_STAT_AT_DTM_END: NORMAL
MDC_IDC_STAT_AT_DTM_START: NORMAL
MDC_IDC_STAT_BRADY_AP_VP_PERCENT: 0.04 %
MDC_IDC_STAT_BRADY_AP_VS_PERCENT: 0.09 %
MDC_IDC_STAT_BRADY_AS_VP_PERCENT: 0.06 %
MDC_IDC_STAT_BRADY_AS_VS_PERCENT: 99.81 %
MDC_IDC_STAT_BRADY_DTM_END: NORMAL
MDC_IDC_STAT_BRADY_DTM_START: NORMAL
MDC_IDC_STAT_BRADY_RA_PERCENT_PACED: 0.13 %
MDC_IDC_STAT_BRADY_RV_PERCENT_PACED: 0.1 %
MDC_IDC_STAT_EPISODE_RECENT_COUNT: 0
MDC_IDC_STAT_EPISODE_RECENT_COUNT_DTM_END: NORMAL
MDC_IDC_STAT_EPISODE_RECENT_COUNT_DTM_START: NORMAL
MDC_IDC_STAT_EPISODE_TOTAL_COUNT: 0
MDC_IDC_STAT_EPISODE_TOTAL_COUNT: 1
MDC_IDC_STAT_EPISODE_TOTAL_COUNT: 1
MDC_IDC_STAT_EPISODE_TOTAL_COUNT: 10
MDC_IDC_STAT_EPISODE_TOTAL_COUNT: 36
MDC_IDC_STAT_EPISODE_TOTAL_COUNT_DTM_END: NORMAL
MDC_IDC_STAT_EPISODE_TOTAL_COUNT_DTM_START: NORMAL
MDC_IDC_STAT_EPISODE_TYPE: NORMAL
MDC_IDC_STAT_TACHYTHERAPY_ATP_DELIVERED_RECENT: 0
MDC_IDC_STAT_TACHYTHERAPY_ATP_DELIVERED_TOTAL: 0
MDC_IDC_STAT_TACHYTHERAPY_RECENT_DTM_END: NORMAL
MDC_IDC_STAT_TACHYTHERAPY_RECENT_DTM_START: NORMAL
MDC_IDC_STAT_TACHYTHERAPY_SHOCKS_ABORTED_RECENT: 0
MDC_IDC_STAT_TACHYTHERAPY_SHOCKS_ABORTED_TOTAL: 0
MDC_IDC_STAT_TACHYTHERAPY_SHOCKS_DELIVERED_RECENT: 0
MDC_IDC_STAT_TACHYTHERAPY_SHOCKS_DELIVERED_TOTAL: 1
MDC_IDC_STAT_TACHYTHERAPY_TOTAL_DTM_END: NORMAL
MDC_IDC_STAT_TACHYTHERAPY_TOTAL_DTM_START: NORMAL
PLATELET # BLD AUTO: 409 10E3/UL (ref 150–450)
POTASSIUM SERPL-SCNC: 4.3 MMOL/L (ref 3.4–5.3)
RBC # BLD AUTO: 3.34 10E6/UL (ref 4.4–5.9)
SODIUM SERPL-SCNC: 137 MMOL/L (ref 136–145)
WBC # BLD AUTO: 7.1 10E3/UL (ref 4–11)

## 2023-01-13 PROCEDURE — C1898 LEAD, PMKR, OTHER THAN TRANS: HCPCS | Performed by: INTERNAL MEDICINE

## 2023-01-13 PROCEDURE — 258N000003 HC RX IP 258 OP 636: Performed by: INTERNAL MEDICINE

## 2023-01-13 PROCEDURE — 99153 MOD SED SAME PHYS/QHP EA: CPT | Performed by: INTERNAL MEDICINE

## 2023-01-13 PROCEDURE — 93283 PRGRMG EVAL IMPLANTABLE DFB: CPT | Mod: 26 | Performed by: INTERNAL MEDICINE

## 2023-01-13 PROCEDURE — 93005 ELECTROCARDIOGRAM TRACING: CPT

## 2023-01-13 PROCEDURE — 999N000065 XR CHEST PORT 1 VIEW

## 2023-01-13 PROCEDURE — 85027 COMPLETE CBC AUTOMATED: CPT | Performed by: INTERNAL MEDICINE

## 2023-01-13 PROCEDURE — C1894 INTRO/SHEATH, NON-LASER: HCPCS | Performed by: INTERNAL MEDICINE

## 2023-01-13 PROCEDURE — 71045 X-RAY EXAM CHEST 1 VIEW: CPT | Mod: 26 | Performed by: RADIOLOGY

## 2023-01-13 PROCEDURE — 33216 INSERT 1 ELECTRODE PM-DEFIB: CPT | Performed by: INTERNAL MEDICINE

## 2023-01-13 PROCEDURE — 999N000134 HC STATISTIC PP CARE STAGE 3

## 2023-01-13 PROCEDURE — 999N000142 HC STATISTIC PROCEDURE PREP ONLY

## 2023-01-13 PROCEDURE — 36415 COLL VENOUS BLD VENIPUNCTURE: CPT | Performed by: INTERNAL MEDICINE

## 2023-01-13 PROCEDURE — 82310 ASSAY OF CALCIUM: CPT | Performed by: INTERNAL MEDICINE

## 2023-01-13 PROCEDURE — 33216 INSERT 1 ELECTRODE PM-DEFIB: CPT | Mod: GC | Performed by: INTERNAL MEDICINE

## 2023-01-13 PROCEDURE — C1769 GUIDE WIRE: HCPCS | Performed by: INTERNAL MEDICINE

## 2023-01-13 PROCEDURE — 272N000001 HC OR GENERAL SUPPLY STERILE: Performed by: INTERNAL MEDICINE

## 2023-01-13 PROCEDURE — 999N000054 HC STATISTIC EKG NON-CHARGEABLE

## 2023-01-13 PROCEDURE — 85610 PROTHROMBIN TIME: CPT | Performed by: INTERNAL MEDICINE

## 2023-01-13 PROCEDURE — 93010 ELECTROCARDIOGRAM REPORT: CPT | Mod: 76 | Performed by: INTERNAL MEDICINE

## 2023-01-13 PROCEDURE — 93283 PRGRMG EVAL IMPLANTABLE DFB: CPT

## 2023-01-13 PROCEDURE — 250N000011 HC RX IP 250 OP 636: Performed by: INTERNAL MEDICINE

## 2023-01-13 PROCEDURE — 99152 MOD SED SAME PHYS/QHP 5/>YRS: CPT | Mod: GC | Performed by: INTERNAL MEDICINE

## 2023-01-13 PROCEDURE — 99152 MOD SED SAME PHYS/QHP 5/>YRS: CPT | Performed by: INTERNAL MEDICINE

## 2023-01-13 DEVICE — IMP LEAD PACING BIPOLAR CAPSUREFIX NOVUS 52CM 5076-52: Type: IMPLANTABLE DEVICE | Status: FUNCTIONAL

## 2023-01-13 RX ORDER — NALOXONE HYDROCHLORIDE 0.4 MG/ML
0.4 INJECTION, SOLUTION INTRAMUSCULAR; INTRAVENOUS; SUBCUTANEOUS
Status: DISCONTINUED | OUTPATIENT
Start: 2023-01-13 | End: 2023-01-13 | Stop reason: HOSPADM

## 2023-01-13 RX ORDER — FENTANYL CITRATE 50 UG/ML
INJECTION, SOLUTION INTRAMUSCULAR; INTRAVENOUS
Status: DISCONTINUED | OUTPATIENT
Start: 2023-01-13 | End: 2023-01-13 | Stop reason: HOSPADM

## 2023-01-13 RX ORDER — LIDOCAINE 50 MG/G
1 PATCH TOPICAL EVERY 24 HOURS
Qty: 3 PATCH | Refills: 3 | Status: ON HOLD | OUTPATIENT
Start: 2023-01-13 | End: 2024-06-13

## 2023-01-13 RX ORDER — CEPHALEXIN 500 MG/1
500 TABLET ORAL 3 TIMES DAILY
Qty: 15 TABLET | Refills: 0 | Status: SHIPPED | OUTPATIENT
Start: 2023-01-13 | End: 2023-01-13

## 2023-01-13 RX ORDER — NALOXONE HYDROCHLORIDE 0.4 MG/ML
0.2 INJECTION, SOLUTION INTRAMUSCULAR; INTRAVENOUS; SUBCUTANEOUS
Status: DISCONTINUED | OUTPATIENT
Start: 2023-01-13 | End: 2023-01-13 | Stop reason: HOSPADM

## 2023-01-13 RX ORDER — CEPHALEXIN 500 MG/1
500 TABLET ORAL 3 TIMES DAILY
Qty: 15 TABLET | Refills: 0 | Status: ON HOLD | OUTPATIENT
Start: 2023-01-13 | End: 2023-03-19

## 2023-01-13 RX ORDER — SODIUM CHLORIDE 9 MG/ML
INJECTION, SOLUTION INTRAVENOUS CONTINUOUS
Status: DISCONTINUED | OUTPATIENT
Start: 2023-01-13 | End: 2023-01-13 | Stop reason: HOSPADM

## 2023-01-13 RX ORDER — OXYCODONE AND ACETAMINOPHEN 5; 325 MG/1; MG/1
1 TABLET ORAL EVERY 4 HOURS PRN
Status: DISCONTINUED | OUTPATIENT
Start: 2023-01-13 | End: 2023-01-13 | Stop reason: HOSPADM

## 2023-01-13 RX ORDER — LIDOCAINE 40 MG/G
CREAM TOPICAL
Status: DISCONTINUED | OUTPATIENT
Start: 2023-01-13 | End: 2023-01-13 | Stop reason: HOSPADM

## 2023-01-13 RX ORDER — CEFAZOLIN SODIUM 2 G/100ML
2 INJECTION, SOLUTION INTRAVENOUS
Status: COMPLETED | OUTPATIENT
Start: 2023-01-13 | End: 2023-01-13

## 2023-01-13 RX ADMIN — CEFAZOLIN 2 G: 10 INJECTION, POWDER, FOR SOLUTION INTRAVENOUS at 10:23

## 2023-01-13 RX ADMIN — SODIUM CHLORIDE: 9 INJECTION, SOLUTION INTRAVENOUS at 09:05

## 2023-01-13 ASSESSMENT — ACTIVITIES OF DAILY LIVING (ADL)
ADLS_ACUITY_SCORE: 39

## 2023-01-13 NOTE — PROGRESS NOTES
D/I/A: Pt roomed on 3C in bay [address].  Arrived via litter and accompanied by RN on monitor.  VSSA.  Rhythm upon arrival: SR with BBB on monitor.  Denies pain or sob.  Reviewed activity restrictions and when to notify RN, ie-changes to breathing or increased chest pressure or chest pain.  CCL access: L chest incision with pressure dressing on, c/d/i, arm in sling. LVAD numbers wnl, back up controller and battery in room. LVAD coordinator changing driveline dressing now.   P: Continue to monitor status.  Discharge to home once meeting criteria.

## 2023-01-13 NOTE — H&P
Electrophysiology Pre-Procedure History and Physical    Dandy Sands MRN# [id number]   Age: 62 year old YOB: 1960      Date of Procedure: 1/13/2023  [facility]      Date of Exam 1/13/2023 Facility (Same day)       Home clinic: [facility]  Primary care provider: Austin Sierra  HPI:  Mr. Sands is a 62 year old male who has a past medical history significant for CAD s/p PCI, ICM LVEF 10-15%, s/p dual chamber CRTD 2006, s/p HM3 LVAD 7/28/22, drive line infection, SLE, antiphospholipid syndrome, HTN, HLD, and PE.    He has a longstanding history of CAD. He had PCI to LAD first in 2005. He had a STEMI in 2007. He has had subsequent ICM. He eventually had CRTD implanted in 2006 with gen change in 2012. He was admitted after having COVID-19 for respiratory distress. It was unclear whether this initially was related to pneumonia or decompensated heart failure. After a prolonged hospitalization and extensive testing it was felt that this was related to decompensated heart failure with progression of his MR. Due to persistent hypotension he was taken off most of his GDMT. He was seen HF who felt that the MR was the primary  of his decompensation.  He was eventually discharged with referral for mitraclip eval. He had coronary angiogram on 5/9/22 showing severe ostial LAD disease with in-stent restenosis of the mid LAD involving a diagonal bifurcation, mild LCx disease, and severe proximal RCA disease. LVEDP was 25.  TAVARES with anesthesia showed LVEF 30-35%, severely dilated LA, moderate to severe functional MR with multiple jets and apically tethered leaflets (he was hypotensive during the procedure SBP 70s). Due to his age, low LVEF, and multivessel CAD he was admitted that day for surgical evaluation.  CTS at [place] felt he would benefit from CABG + MVR but that he should be at a center with ECMO/VAD back up.  He was readmitted on  5/27/22 with nausea, vomiting, and hypotension. A right heart catheterization which showed minimally elevated filling pressures and borderline cardiac index however no clear evidence of cardiogenic shock.  During the admission medications were adjusted, from Brilinta we will switch back to Plavix and ultimately his nausea resolved and was able to tolerate food before discharge.  He continued to have worsening HF issues and ultimately underwent LVAD in 7/28/22. His ICD lead was found to have low R wave amplitude post LVAD. His LVAD surgery was complicated by severe right ventricular failure requiring temporary RVAD.  He also had severe delirium and at one point tearing out his bridled nasogastric tube, causing a nasal septal perforation and severe nosebleeding.  He was discharged to rehab and then we presented with severe C. difficile diarrhea, dehydration, low flow alarms, as well as COVID-19 infection. He saw Dr. Maurice in clinic who discussed management options with him. He elected to pursue RV lead revision/adddition for which he presents today. Of note, He did visit the ER for abdominal and chest pain and has been on IV antibiotics for driveline infection.  He has PICC line and is on daily IV abx and states he has some drainage still from drive line but improving. INR 3.27.        Active problem list:     Patient Active Problem List    Diagnosis Date Noted     External hemorrhoids 11/03/2022     Priority: Medium     Fever and chills 10/27/2022     Priority: Medium     Chills 10/14/2022     Priority: Medium     COVID 10/14/2022     Priority: Medium     Nausea and vomiting, unspecified vomiting type 10/14/2022     Priority: Medium     Left ventricular assist device present (H) 10/07/2022     Priority: Medium     C. difficile diarrhea 09/18/2022     Priority: Medium     Complication involving left ventricular assist device (LVAD) 09/06/2022     Priority: Medium     Acute diarrhea 09/06/2022     Priority: Medium      "Lightheadedness 09/05/2022     Priority: Medium     Recurrent epistaxis 09/05/2022     Priority: Medium     Epistaxis 09/05/2022     Priority: Medium     Heart failure with reduced ejection fraction, NYHA class III (H) 08/21/2022     Priority: Medium     Chronic systolic congestive heart failure (H) 08/19/2022     Priority: Medium     LVAD (left ventricular assist device) present (H) 07/11/2022     Priority: Medium     Cardiogenic shock (H) 05/28/2022     Priority: Medium     Decompensated heart failure (H) 05/16/2022     Priority: Medium            Medications (include herbals and vitamins):      Current Facility-Administered Medications   Medication     ceFAZolin (ANCEF) 2 g in 100 mL D5W intermittent infusion     lidocaine (LMX4) cream     lidocaine 1 % 0.1-1 mL     sodium chloride (PF) 0.9% PF flush 3 mL     sodium chloride (PF) 0.9% PF flush 3 mL     sodium chloride (PF) 0.9% PF flush 3 mL     sodium chloride 0.9% infusion           Medication List      There are no discharge medications for this visit.              Allergies:    No Known Allergies          Social History:     Social History     Tobacco Use     Smoking status: Former     Smokeless tobacco: Never   Substance Use Topics     Alcohol use: Not on file            Physical Exam:   All vitals have been reviewed  Patient Vitals for the past 8 hrs:   Temp Temp src Pulse Resp SpO2 Height Weight   01/13/23 0800 97.6  F (36.4  C) Oral 55 20 98 % 1.702 m (5' 7\") 64.7 kg (142 lb 11.2 oz)     No intake/output data recorded.  Airway assessment:   Patient is able to open mouth wide  Patient is able to stick out tongue}      ENT:   Normocephalic, without obvious abnormality, atraumatic, sinuses nontender on palpation, external ears without lesions, oral pharynx with moist mucous membranes, tonsils without erythema or exudates, gums normal and good dentition.     Neck:   Supple, symmetrical, trachea midline, no adenopathy, thyroid symmetric, not enlarged and no " tenderness, skin normal     Lungs:   No increased work of breathing, good air exchange, clear to auscultation bilaterally, no crackles or wheezing     Cardiovascular:   LVAD hum, regular.             Lab / Radiology Results:     Lab Results   Component Value Date    WBC 7.1 01/13/2023    RBC 3.34 01/13/2023    HGB 8.9 01/13/2023    HCT 29.6 01/13/2023    MCV 89 01/13/2023    RDW 16.4 01/13/2023     01/13/2023      Lab Results   Component Value Date    WBC 7.1 01/13/2023     Lab Results   Component Value Date     01/13/2023     Lab Results   Component Value Date    HGB 8.9 01/13/2023    HCT 29.6 01/13/2023     Lab Results   Component Value Date     01/12/2023    CO2 24 01/12/2023    CO2 22 09/01/2022    BUN 16.4 01/12/2023    BUN 13 09/01/2022     Lab Results   Component Value Date     01/12/2023    CO2 24 01/12/2023    CO2 22 09/01/2022    BUN 16.4 01/12/2023    BUN 13 09/01/2022     Lab Results   Component Value Date     01/12/2023     Lab Results   Component Value Date    BUN 16.4 01/12/2023    BUN 13 09/01/2022     Lab Results   Component Value Date    TSH 1.67 09/18/2022    TSH 1.65 05/20/2022             Plan:   Patient's active problems diagnostically and therapeutically optimized for the planned procedure. Patient here for RV lead revision/replacement. Procedure explained in detail to patient including indications, risks, and benefits. He states understanding and wishes to procced. Dr. Maurice made aware of the IV abx/driveline infection and INR and states OK to proceed.     Brenda Perkins, APRN CNP  Electrophysiology Consult Service  Pager: [phone]

## 2023-01-13 NOTE — PROGRESS NOTES
Received notification from Dr. Nguyen that he can perform cyroablation on Monday, 1/16/23 at 2:30PM. Pt to arrive to 3C at 2pm. No further pre-procedure instructions provided.   Notified pt and son of instructions.   Met with pt in recovery unit to perform LVAD drive line exit site dressing change. Site with very minimal drainage. Pt denies tenderness at the site.

## 2023-01-13 NOTE — PROGRESS NOTES
Pt prepped for CRT-D Lead Revision.  VSS.  Doppler MAP 78.  LVAD numbers WNL.  Backup bag at the bedside; wall power unit also available.  Chest clipped and wiped down with CHG paco wipes.  Left PIV placed and currently running NS at 30 ml/hr.  Consent signed.  Labs resulted.  INR 3.27, Dr. Maurice aware and OK with value.  Device nurse interrogated device at bedside.  EKG done.  Per pt report, will call son Amos to pick him up post procedure.

## 2023-01-13 NOTE — PROGRESS NOTES
Pt tolerating po intake, ambulated in freitas and voided. Vitals stable, no LVAD alarms noted. L chest incision covered c/d/i with no bleeding. Discharge instructions reviewed with pt. Pt seen by device nurse, follow up CXR and EKG done and reviewed. Pt has no further questions. Pt discharged via wheelchair with son. Prescription for keflex picked up from  discharge pharmacy on way out. All belongings sent with pt including backup LVAD controller and battery.

## 2023-01-13 NOTE — DISCHARGE INSTRUCTIONS
Home Care after a RV Lead Revision    Wound care:  Keep your incision (surgery wound) dry for 3 days.  After 3 days, you may remove the outer bandage.  Keep the strips of tape on.  They will be removed at your clinic visit.  Check for signs of infection each day.  These include increased redness, swelling, drainage, bleeding or a fever over 101 F (38.3 C).  Call us immediately if you see any of these signs.  If there are no signs of infection, you may shower in 3 days.  Do not submerge the incision (in a bath tub, hot tub, or swimming pool) until fully healed.    Pain:   You may have mild to moderate pain for 3 to 5 days.  Take acetaminophen (Tylenol) or ibuprofen (Advil) for the pain.  Call us if the pain is severe or lasts more than 5 days.    Activity:  After 24 hours, slowly return to your normal activities.   Healing will take 4 to 6 weeks.  No driving for 3 days  Avoid climbing a ladder alone.  It is best to stay within 4 feet of the ground.  Avoid anything that may cause rough contact or a hard hit to your chest.  This includes football, hockey, and other contact sports.  For at least 4 weeks:  Do not raise your affected arm above your shoulder.  Do not use your affected arm to push, pull, or lift anything over 10 pounds.  Avoid repetitive upper body activities for 6 weeks (ie: golf, swimming, and weight lifting)    Follow Up Visits:  Return to the clinic in 7 to 10 days to have your device and wound checked.      Telling others about your device:  Before you have any medical tests or treatments, tell the doctors, dentists, and other care providers about your device.  There are a few tests and treatments that may interfere with your device.  (These include MRI, radiation therapy, electrocautery, and others.)  Your care team may need to take special steps to keep you safe.  Before you leave the hospital, you will receive a temporary ID card.  A permanent card will be mailed to you about 6 to 8 weeks later.   Always carry the ID card with you.  It has important details about your device.  You should also get a MedicAlert ID.  Please ask us for a MedicAlert brochure, or go to [email or web address].    Safety near electrical equipment:  All of these are safe to use when in good repair:  Microwaves  Radios  Cordless phone  Remote controls  Small electrical tools  Cell phones: Keep cell phones at least 6 inches from your device.  Do not carry the phone in a pocket near your device.  Security randhawa: It is okay to walk through security randhawa at the airports and department stores.  Tell airport security that you have a pacemaker.  They should keep the screening wand at least 6 inches from your device.  Full-body scanners are safe.  Avoid the following:   MRI tests in the hospital unless you have a MRI safe pacemaker.          Arc welding, chain saws and high-powered industrial or commercial tools.   Power lines, power plants and large power generators.   Electric body fat scales.   Magnetic mattress pads or pillow.    Questions?  Please call [facility].   Device Nurse:          Business Hours:  [phone]                       After Hours:  [phone]   Choose option 4, then ask for the                                                  on-call device nurse at job code [id number].    Your next device clinic appointment is scheduled on:  Make sure to schedule an appointment in the next 7 days to have your incision and device checked.         [facility]  [facility]  [address] [place] [address]  [phone]

## 2023-01-16 ENCOUNTER — ANESTHESIA EVENT (OUTPATIENT)
Dept: GASTROENTEROLOGY | Facility: CLINIC | Age: 63
End: 2023-01-16
Payer: COMMERCIAL

## 2023-01-16 ENCOUNTER — DOCUMENTATION ONLY (OUTPATIENT)
Dept: ANTICOAGULATION | Facility: CLINIC | Age: 63
End: 2023-01-16

## 2023-01-16 ENCOUNTER — HOSPITAL ENCOUNTER (OUTPATIENT)
Facility: CLINIC | Age: 63
Discharge: HOME | End: 2023-01-16
Attending: ANESTHESIOLOGY | Admitting: ANESTHESIOLOGY
Payer: COMMERCIAL

## 2023-01-16 ENCOUNTER — ANESTHESIA (OUTPATIENT)
Dept: GASTROENTEROLOGY | Facility: CLINIC | Age: 63
End: 2023-01-16
Payer: COMMERCIAL

## 2023-01-16 VITALS
DIASTOLIC BLOOD PRESSURE: 82 MMHG | SYSTOLIC BLOOD PRESSURE: 101 MMHG | OXYGEN SATURATION: 95 % | RESPIRATION RATE: 14 BRPM | HEART RATE: 79 BPM

## 2023-01-16 DIAGNOSIS — G89.29 OTHER CHRONIC PAIN: Primary | ICD-10-CM

## 2023-01-16 LAB
ATRIAL RATE - MUSE: NORMAL BPM
ATRIAL RATE - MUSE: NORMAL BPM
DIASTOLIC BLOOD PRESSURE - MUSE: NORMAL MMHG
DIASTOLIC BLOOD PRESSURE - MUSE: NORMAL MMHG
INTERPRETATION ECG - MUSE: NORMAL
INTERPRETATION ECG - MUSE: NORMAL
MDC_IDC_LEAD_IMPLANT_DT: NORMAL
MDC_IDC_LEAD_IMPLANT_DT: NORMAL
MDC_IDC_LEAD_LOCATION: NORMAL
MDC_IDC_LEAD_LOCATION: NORMAL
MDC_IDC_LEAD_LOCATION_DETAIL_1: NORMAL
MDC_IDC_LEAD_LOCATION_DETAIL_1: NORMAL
MDC_IDC_LEAD_MFG: NORMAL
MDC_IDC_LEAD_MFG: NORMAL
MDC_IDC_LEAD_MODEL: NORMAL
MDC_IDC_LEAD_MODEL: NORMAL
MDC_IDC_LEAD_POLARITY_TYPE: NORMAL
MDC_IDC_LEAD_POLARITY_TYPE: NORMAL
MDC_IDC_LEAD_SERIAL: NORMAL
MDC_IDC_LEAD_SERIAL: NORMAL
MDC_IDC_MSMT_BATTERY_DTM: NORMAL
MDC_IDC_MSMT_BATTERY_REMAINING_LONGEVITY: 51 MO
MDC_IDC_MSMT_BATTERY_RRT_TRIGGER: 2.73
MDC_IDC_MSMT_BATTERY_STATUS: NORMAL
MDC_IDC_MSMT_BATTERY_VOLTAGE: 2.97 V
MDC_IDC_MSMT_CAP_CHARGE_DTM: NORMAL
MDC_IDC_MSMT_CAP_CHARGE_ENERGY: 18 J
MDC_IDC_MSMT_CAP_CHARGE_TIME: 3.99
MDC_IDC_MSMT_CAP_CHARGE_TYPE: NORMAL
MDC_IDC_MSMT_LEADCHNL_RA_IMPEDANCE_VALUE: 342 OHM
MDC_IDC_MSMT_LEADCHNL_RA_PACING_THRESHOLD_AMPLITUDE: 0.5 V
MDC_IDC_MSMT_LEADCHNL_RA_PACING_THRESHOLD_PULSEWIDTH: 0.4 MS
MDC_IDC_MSMT_LEADCHNL_RA_SENSING_INTR_AMPL: 1 MV
MDC_IDC_MSMT_LEADCHNL_RV_IMPEDANCE_VALUE: 342 OHM
MDC_IDC_MSMT_LEADCHNL_RV_IMPEDANCE_VALUE: 475 OHM
MDC_IDC_MSMT_LEADCHNL_RV_SENSING_INTR_AMPL: 1 MV
MDC_IDC_PG_IMPLANT_DTM: NORMAL
MDC_IDC_PG_MFG: NORMAL
MDC_IDC_PG_MODEL: NORMAL
MDC_IDC_PG_SERIAL: NORMAL
MDC_IDC_PG_TYPE: NORMAL
MDC_IDC_SESS_CLINIC_NAME: NORMAL
MDC_IDC_SESS_DTM: NORMAL
MDC_IDC_SESS_TYPE: NORMAL
MDC_IDC_SET_BRADY_AT_MODE_SWITCH_RATE: 171 {BEATS}/MIN
MDC_IDC_SET_BRADY_HYSTRATE: NORMAL
MDC_IDC_SET_BRADY_LOWRATE: 50 {BEATS}/MIN
MDC_IDC_SET_BRADY_MAX_SENSOR_RATE: 115 {BEATS}/MIN
MDC_IDC_SET_BRADY_MAX_TRACKING_RATE: 130 {BEATS}/MIN
MDC_IDC_SET_BRADY_MODE: NORMAL
MDC_IDC_SET_BRADY_PAV_DELAY_LOW: 350 MS
MDC_IDC_SET_BRADY_SAV_DELAY_LOW: 350 MS
MDC_IDC_SET_LEADCHNL_RA_PACING_AMPLITUDE: 1.5 V
MDC_IDC_SET_LEADCHNL_RA_PACING_ANODE_ELECTRODE_1: NORMAL
MDC_IDC_SET_LEADCHNL_RA_PACING_ANODE_LOCATION_1: NORMAL
MDC_IDC_SET_LEADCHNL_RA_PACING_CAPTURE_MODE: NORMAL
MDC_IDC_SET_LEADCHNL_RA_PACING_CATHODE_ELECTRODE_1: NORMAL
MDC_IDC_SET_LEADCHNL_RA_PACING_CATHODE_LOCATION_1: NORMAL
MDC_IDC_SET_LEADCHNL_RA_PACING_POLARITY: NORMAL
MDC_IDC_SET_LEADCHNL_RA_PACING_PULSEWIDTH: 0.4 MS
MDC_IDC_SET_LEADCHNL_RA_SENSING_ANODE_ELECTRODE_1: NORMAL
MDC_IDC_SET_LEADCHNL_RA_SENSING_ANODE_LOCATION_1: NORMAL
MDC_IDC_SET_LEADCHNL_RA_SENSING_CATHODE_ELECTRODE_1: NORMAL
MDC_IDC_SET_LEADCHNL_RA_SENSING_CATHODE_LOCATION_1: NORMAL
MDC_IDC_SET_LEADCHNL_RA_SENSING_POLARITY: NORMAL
MDC_IDC_SET_LEADCHNL_RA_SENSING_SENSITIVITY: 0.15 MV
MDC_IDC_SET_LEADCHNL_RV_PACING_AMPLITUDE: 6 V
MDC_IDC_SET_LEADCHNL_RV_PACING_ANODE_ELECTRODE_1: NORMAL
MDC_IDC_SET_LEADCHNL_RV_PACING_ANODE_LOCATION_1: NORMAL
MDC_IDC_SET_LEADCHNL_RV_PACING_CAPTURE_MODE: NORMAL
MDC_IDC_SET_LEADCHNL_RV_PACING_CATHODE_ELECTRODE_1: NORMAL
MDC_IDC_SET_LEADCHNL_RV_PACING_CATHODE_LOCATION_1: NORMAL
MDC_IDC_SET_LEADCHNL_RV_PACING_POLARITY: NORMAL
MDC_IDC_SET_LEADCHNL_RV_PACING_PULSEWIDTH: 1 MS
MDC_IDC_SET_LEADCHNL_RV_SENSING_ANODE_ELECTRODE_1: NORMAL
MDC_IDC_SET_LEADCHNL_RV_SENSING_ANODE_LOCATION_1: NORMAL
MDC_IDC_SET_LEADCHNL_RV_SENSING_CATHODE_ELECTRODE_1: NORMAL
MDC_IDC_SET_LEADCHNL_RV_SENSING_CATHODE_LOCATION_1: NORMAL
MDC_IDC_SET_LEADCHNL_RV_SENSING_POLARITY: NORMAL
MDC_IDC_SET_LEADCHNL_RV_SENSING_SENSITIVITY: 0.3 MV
MDC_IDC_SET_ZONE_DETECTION_BEATS_DENOMINATOR: 40 {BEATS}
MDC_IDC_SET_ZONE_DETECTION_BEATS_NUMERATOR: 30 {BEATS}
MDC_IDC_SET_ZONE_DETECTION_INTERVAL: 280 MS
MDC_IDC_SET_ZONE_DETECTION_INTERVAL: 320 MS
MDC_IDC_SET_ZONE_DETECTION_INTERVAL: 350 MS
MDC_IDC_SET_ZONE_DETECTION_INTERVAL: 350 MS
MDC_IDC_SET_ZONE_DETECTION_INTERVAL: 400 MS
MDC_IDC_SET_ZONE_TYPE: NORMAL
MDC_IDC_STAT_AT_BURDEN_PERCENT: 0 %
MDC_IDC_STAT_AT_DTM_END: NORMAL
MDC_IDC_STAT_AT_DTM_START: NORMAL
MDC_IDC_STAT_BRADY_AP_VP_PERCENT: 0 %
MDC_IDC_STAT_BRADY_AP_VS_PERCENT: 0.01 %
MDC_IDC_STAT_BRADY_AS_VP_PERCENT: 0.04 %
MDC_IDC_STAT_BRADY_AS_VS_PERCENT: 99.95 %
MDC_IDC_STAT_BRADY_DTM_END: NORMAL
MDC_IDC_STAT_BRADY_DTM_START: NORMAL
MDC_IDC_STAT_BRADY_RA_PERCENT_PACED: 0.01 %
MDC_IDC_STAT_BRADY_RV_PERCENT_PACED: 0.04 %
MDC_IDC_STAT_EPISODE_RECENT_COUNT: 0
MDC_IDC_STAT_EPISODE_RECENT_COUNT_DTM_END: NORMAL
MDC_IDC_STAT_EPISODE_RECENT_COUNT_DTM_START: NORMAL
MDC_IDC_STAT_EPISODE_TOTAL_COUNT: 0
MDC_IDC_STAT_EPISODE_TOTAL_COUNT: 1
MDC_IDC_STAT_EPISODE_TOTAL_COUNT: 1
MDC_IDC_STAT_EPISODE_TOTAL_COUNT: 10
MDC_IDC_STAT_EPISODE_TOTAL_COUNT: 36
MDC_IDC_STAT_EPISODE_TOTAL_COUNT_DTM_END: NORMAL
MDC_IDC_STAT_EPISODE_TOTAL_COUNT_DTM_START: NORMAL
MDC_IDC_STAT_EPISODE_TYPE: NORMAL
MDC_IDC_STAT_TACHYTHERAPY_ATP_DELIVERED_RECENT: 0
MDC_IDC_STAT_TACHYTHERAPY_ATP_DELIVERED_TOTAL: 0
MDC_IDC_STAT_TACHYTHERAPY_RECENT_DTM_END: NORMAL
MDC_IDC_STAT_TACHYTHERAPY_RECENT_DTM_START: NORMAL
MDC_IDC_STAT_TACHYTHERAPY_SHOCKS_ABORTED_RECENT: 0
MDC_IDC_STAT_TACHYTHERAPY_SHOCKS_ABORTED_TOTAL: 0
MDC_IDC_STAT_TACHYTHERAPY_SHOCKS_DELIVERED_RECENT: 0
MDC_IDC_STAT_TACHYTHERAPY_SHOCKS_DELIVERED_TOTAL: 1
MDC_IDC_STAT_TACHYTHERAPY_TOTAL_DTM_END: NORMAL
MDC_IDC_STAT_TACHYTHERAPY_TOTAL_DTM_START: NORMAL
P AXIS - MUSE: NORMAL DEGREES
P AXIS - MUSE: NORMAL DEGREES
PR INTERVAL - MUSE: NORMAL MS
PR INTERVAL - MUSE: NORMAL MS
QRS DURATION - MUSE: 160 MS
QRS DURATION - MUSE: 198 MS
QT - MUSE: 422 MS
QT - MUSE: 490 MS
QTC - MUSE: 486 MS
QTC - MUSE: 565 MS
R AXIS - MUSE: -40 DEGREES
R AXIS - MUSE: -52 DEGREES
SYSTOLIC BLOOD PRESSURE - MUSE: NORMAL MMHG
SYSTOLIC BLOOD PRESSURE - MUSE: NORMAL MMHG
T AXIS - MUSE: -2 DEGREES
T AXIS - MUSE: -22 DEGREES
VENTRICULAR RATE- MUSE: 80 BPM
VENTRICULAR RATE- MUSE: 80 BPM

## 2023-01-16 PROCEDURE — 64450 NJX AA&/STRD OTHER PN/BRANCH: CPT | Performed by: ANESTHESIOLOGY

## 2023-01-16 PROCEDURE — 370N000017 HC ANESTHESIA TECHNICAL FEE, PER MIN: Performed by: ANESTHESIOLOGY

## 2023-01-16 PROCEDURE — 250N000009 HC RX 250: Performed by: ANESTHESIOLOGY

## 2023-01-16 PROCEDURE — 64620 INJECTION TREATMENT OF NERVE: CPT | Performed by: ANESTHESIOLOGY

## 2023-01-16 RX ORDER — LIDOCAINE HYDROCHLORIDE 10 MG/ML
INJECTION, SOLUTION INFILTRATION; PERINEURAL PRN
Status: DISCONTINUED | OUTPATIENT
Start: 2023-01-16 | End: 2023-01-16 | Stop reason: HOSPADM

## 2023-01-16 ASSESSMENT — ACTIVITIES OF DAILY LIVING (ADL): ADLS_ACUITY_SCORE: 39

## 2023-01-16 NOTE — ANESTHESIA PROCEDURE NOTES
Other (Intercostal) Procedure Note    Pre-Procedure   Staff -        Anesthesiologist:  Kenroy Nguyen MD       Performed By: anesthesiologist       Location: OR       Procedure Start/Stop Times: 1/16/2023 2:50 PM and 1/16/2023 3:10 PM       Pre-Anesthestic Checklist: patient identified, IV checked, site marked, risks and benefits discussed, informed consent, monitors and equipment checked, pre-op evaluation, at physician/surgeon's request and post-op pain management  Timeout:       Correct Patient: Yes        Correct Procedure: Yes        Correct Site: Yes        Correct Position: Yes        Correct Laterality: Yes        Site Marked: Yes  Procedure Documentation  Procedure: Other (Intercostal)       Laterality: left       Patient Position: RLD       Patient Prep/Sterile Barriers: mask, patient draped       Skin prep: Chloraprep       1. Ultrasound was used to identify targeted nerve, plexus, vascular marker, or fascial plane and place a needle adjacent to it in real-time.       2. Ultrasound was used to visualize the spread of anesthetic in close proximity to the above referenced structure.    Assessment/Narrative         Bolus given via needle..        Secured via.        Insertion/Infusion Method: Single Shot       Complications: none    Medication(s) Administered   Medication Administration Time: 1/16/2023 2:50 PM     Comments:  PROCEDURE NOTE: Left Intercostal T6, T7 T8 Nerves Cryoablation    PROCEDURE DATE: January 16, 2023  PATIENT NAME: Home Phone      [phone]  Work Phone      [phone]  Mobile          [phone]    YOB: 1960  ATTENDING PHYSICIAN: Kenroy Nguyen MD  RESIDENT/FELLOW PHYSICIAN: None   PREOPERATIVE DIAGNOSIS: #left rib pain  POSTOPERATIVE DIAGNOSIS: same     OPERATION PERFORMED: Left T6 T7 T8 Intercostal nerve cryoablation Ultrasonography WAS USED.     INDICATIONS FOR PROCEDURE: The patient is a 62 year old male with a clinical picture consistent with left rib  "pain    PROCEDURE AND FINDINGS:  Patient was greeted in the pre-procedure holding area. The risk, benefits and alternatives to the procedure were again reviewed with the patient and written informed consent was placed in the chart. The patient was taken to the procedure room and positioned supine on the procedure room table.  Routine monitors were applied including blood pressure cuff, and pulse oximetry. Prior to the procedure a time out was completed, verifying correct patient, procedure, site, and positioning.  A 13-6 MHz ultrasound probe was used to identify the intercostal nerve.  A skin marker was used to malathi the site of injection.  Then the skin was prepped and ultrasound covered in the usual sterile fashion. The skin overlying the intercostal nerve and the intercostal nerve were anesthetized using a 25-gauge 1-1/2 inch needle with 2% preservative-free lidocaine for a total volume of 2 ml. Next, the smart needle was advanced under ultrasound guidance until it made contact with the nerve.  Then the portable handheld cryoablation device was used to deliver nitrous oxide through the center of the probe, using one cycle of 90 second freezing to below -20 C.  Following thaw, the probe was removed and the entry site was bandaged.    Patient was taken to the recovery room where the patient was monitored for a brief period of time. The patient tolerated the procedure well and was discharged home in stable condition with post procedural instructions. Before the procedure, the patient reported a pain score of 9/10. This was 4/10 prior to discharge      COMPLICATIONS: None   COMMENTS: None          FOR [facility] ([facility]) ONLY:   Pain Team Contact information: please page the Pain Team Via [employer]. Search \"Pain\". During daytime hours, please page the attending first. At night please page the resident first.    "

## 2023-01-16 NOTE — ANESTHESIA PREPROCEDURE EVALUATION
Anesthesia Pre-Procedure Evaluation    Patient: Dandy Sands   MRN: [id number] : 1960        Procedure : Procedure(s):  IRRIGATION AND DEBRIDEMENT, CHEST          61 year old male with a PMH of CAD s/p PCI to LAD, MI, ICM, acute on chronic heart failure, s/p CRT-D, severe MR, antiphospholipid antibody syndrome on chronic warfarin, SLE, HTN, HLD, recent hospitalization - s/p RCA, LAD stenting who underwent RVAD (29F Protek duo RIJ) LVAD placement (HeartMate 3) on 22 by Dr. Zamora. Intraoperative course complicated by IABP dysfunction and RV failure, requiring crash onto bypass / vasopressor support, NO, and protek placement. Now s/p chest closure and NO wean . Return to OR for hemopericardium overnight and chest felt open. Closure planned .       Past Medical History:   Diagnosis Date     Antiphospholipid antibody syndrome (H)      CAD (coronary artery disease)      Chronic systolic heart failure (H)      Ischemic cardiomyopathy      Mitral regurgitation      Systemic lupus erythematosus (H)       Past Surgical History:   Procedure Laterality Date     COLONOSCOPY N/A 2022    Procedure: COLONOSCOPY;  Surgeon: Parth Meneses MD;  Location: [facility] OR     CV CORONARY ANGIOGRAM N/A 2022    Procedure: Coronary Angiogram;  Surgeon: Mike Pope MD;  Location:  HEART CARDIAC CATH LAB     CV CORONARY ANGIOGRAM N/A 2022    Procedure: Coronary Angiogram;  Surgeon: Austin Bright MD;  Location:  HEART CARDIAC CATH LAB     CV CORONARY LITHOTRIPSY PCI Bilateral 2022    Procedure: Percutaneous Coronary Intervention - Lithotripsy;  Surgeon: Mike Pope MD;  Location:  HEART CARDIAC CATH LAB     CV INTRA AORTIC BALLOON N/A 2022    Procedure: Intraprocedure Aortic Balloon Pump Insertion;  Surgeon: Sherman Cerda MD;  Location: [facility] CARDIAC CATH LAB     CV INTRA AORTIC BALLOON Right 2022    Procedure: Reposition of Subclavian  Intraprocedure Aortic Balloon Pump;  Surgeon: Austin Bright MD;  Location:  HEART CARDIAC CATH LAB     CV INTRAVASULAR ULTRASOUND N/A 05/24/2022    Procedure: Intravascular Ultrasound;  Surgeon: Mike Pope MD;  Location:  HEART CARDIAC CATH LAB     CV PCI ANGIOPLASTY N/A 05/29/2022    Procedure: Percutaneous Transluminal Angioplasty;  Surgeon: Austin Bright MD;  Location:  HEART CARDIAC CATH LAB     CV PCI STENT DRUG ELUTING N/A 05/24/2022    Procedure: Percutaneous Coronary Intervention Stent;  Surgeon: Mike Pope MD;  Location:  HEART CARDIAC CATH LAB     CV RIGHT HEART CATH MEASUREMENTS RECORDED N/A 05/18/2022    Procedure: Right Heart Catheterization;  Surgeon: Justo Stewart MD;  Location:  HEART CARDIAC CATH LAB     CV RIGHT HEART CATH MEASUREMENTS RECORDED N/A 05/24/2022    Procedure: Right Heart Catheterization;  Surgeon: Mike Pope MD;  Location:  HEART CARDIAC CATH LAB     CV RIGHT HEART CATH MEASUREMENTS RECORDED N/A 05/29/2022    Procedure: Right Heart Catheterization;  Surgeon: Austin Bright MD;  Location:  HEART CARDIAC CATH LAB     CV RIGHT HEART CATH MEASUREMENTS RECORDED N/A 07/01/2022    Procedure: Right Heart Catheterization;  Surgeon: Mike Pope MD;  Location:  HEART CARDIAC CATH LAB     CV RIGHT HEART CATH MEASUREMENTS RECORDED N/A 09/23/2022    Procedure: Right Heart Catheterization;  Surgeon: Justo Stewart MD;  Location:  HEART CARDIAC CATH LAB     CV RIGHT HEART EXERCISE STRESS STUDY N/A 05/18/2022    Procedure: Stress Drug Study;  Surgeon: Justo Stewart MD;  Location:  HEART CARDIAC CATH LAB     CV SWAN CHOCO PROCEDURE N/A 06/17/2022    Procedure: [place] Choco Procedure;  Surgeon: Sherman Cerda MD;  Location:  HEART CARDIAC CATH LAB     EP PACEMAKER OR ICD LEAD IMPLANT-ONE LEAD N/A 1/13/2023    Procedure: CRT-D Lead revision;  Surgeon: Beckie Maurice MD;  Location:  HEART CARDIAC CATH LAB     INCISION AND DRAINAGE  CHEST WASHOUT, COMBINED N/A 07/11/2022    Procedure: Chest washout and closure;  Surgeon: Darnell Moragn MD;  Location: [facility]     INCISION AND DRAINAGE CHEST WASHOUT, COMBINED N/A 07/12/2022    Procedure: INCISION AND DRAINAGE, WOUND, CHEST, WITH IRRIGATION;  Surgeon: Darius Zamora MD;  Location: [facility] OR     INSERT ARTERIAL LINE  07/18/2022          INSERT INTRAAORTIC BALLOON PUMP Right 06/23/2022    Procedure: INSERTION, INTRA-AORTIC BALLOON PUMP RIGHT SUBCLAVIAN ARTERY.;  Surgeon: Hayden Veras MD;  Location: [facility] OR     INSERT VENTRICULAR ASSIST DEVICE LEFT (HEARTMATE II/III) MINIMALLY INVASIVE N/A 07/08/2022    Procedure: MEDIAN STERNOTOMY.  CARDIOPULMONARY BYPASS.  INTRAOPERATIVE TRANSESOPHAGEAL ECHOCARDIOGRAM.  IMPLANT LEFT VENTRICULAR ASSIST DEVICE HEARTMATE III.  PLACEMENT OF PERCUTANEOUS RIGHT VENTRICULAR ASSIST DEVICE.  TEMPORARY CHEST CLOSURE;  Surgeon: Darius Zamora MD;  Location: [facility] OR     IR CHEST TUBE PLACEMENT NON-TUNNELED RIGHT  08/01/2022     IRRIGATION AND DEBRIDEMENT CHEST WASHOUT, COMBINED N/A 07/13/2022    Procedure: IRRIGATION AND DEBRIDEMENT, CHEST;  Surgeon: Darius Zamora MD;  Location: [facility] OR     MIDLINE DOUBLE LUMEN PLACEMENT Left 09/11/2022    Mid line ok to use     MIDLINE DOUBLE LUMEN PLACEMENT Right 09/14/2022    5FR DL midline.     PICC DOUBLE LUMEN PLACEMENT Right 05/28/2022    right basilic 5 fr dl picc 40 cm     PICC DOUBLE LUMEN PLACEMENT Right 08/15/2022    Right Lateral, 41 total length, 3 cm external length     PICC SINGLE LUMEN PLACEMENT Right 11/01/2022    41cm (2cm external), Lateral brachial vein, low SVC      No Known Allergies   Social History     Tobacco Use     Smoking status: Former     Smokeless tobacco: Never   Substance Use Topics     Alcohol use: Not on file      Wt Readings from Last 1 Encounters:   01/13/23 64.7 kg (142 lb 11.2 oz)        Anesthesia Evaluation            ROS/MED HX  ENT/Pulmonary: Comment: Hypoxic respiratory failure       Neurologic: Comment: Sedated      Cardiovascular:     (+) Dyslipidemia hypertension--CAD -past MI -stent-Drug Eluting Stent. Taking blood thinners     METS/Exercise Tolerance:     Hematologic:     (+) History of blood clots, anemia, history of blood transfusion, no previous transfusion reaction,     Musculoskeletal:       GI/Hepatic:       Renal/Genitourinary:     (+) renal disease, type: CRI, Pt does not require dialysis,     Endo:       Psychiatric/Substance Use:       Infectious Disease:       Malignancy:       Other: Comment: Antiphospholipid syndrome           Physical Exam    Airway  airway exam normal           Respiratory Devices and Support    ETT:      Dental  no notable dental history         Cardiovascular          Rhythm and rate: regular     Pulmonary           (+) rhonchi, decreased breath sounds and rales           OUTSIDE LABS:  CBC:   Lab Results   Component Value Date    WBC 7.1 01/13/2023    WBC 6.7 01/12/2023    HGB 8.9 (L) 01/13/2023    HGB 8.5 (L) 01/12/2023    HCT 29.6 (L) 01/13/2023    HCT 27.0 (L) 01/12/2023     01/13/2023     01/12/2023     BMP:   Lab Results   Component Value Date     01/13/2023     01/12/2023    POTASSIUM 4.3 01/13/2023    POTASSIUM 4.3 01/12/2023    CHLORIDE 102 01/13/2023    CHLORIDE 102 01/12/2023    CO2 21 (L) 01/13/2023    CO2 24 01/12/2023    BUN 15.3 01/13/2023    BUN 16.4 01/12/2023    CR 0.85 01/13/2023    CR 0.80 01/12/2023     (H) 01/13/2023     (H) 01/12/2023     COAGS:   Lab Results   Component Value Date    PTT 65 (H) 08/07/2022    INR 3.27 (H) 01/13/2023    FIBR 376 08/07/2022     POC: No results found for: BGM, HCG, HCGS  HEPATIC:   Lab Results   Component Value Date    ALBUMIN 3.8 01/12/2023    PROTTOTAL 8.7 (H) 01/12/2023    ALT 14 01/12/2023    AST 21 01/12/2023    ALKPHOS 183 (H) 01/12/2023    BILITOTAL 0.4 01/12/2023    MATTHIEU 34 08/07/2022     OTHER:   Lab Results   Component Value Date    PH 7.42 08/08/2022     LACT 1.6 10/28/2022    A1C 5.5 05/20/2022    ELDA 9.6 01/13/2023    PHOS 3.8 09/04/2022    MAG 1.7 11/02/2022    LIPASE 69 (H) 10/13/2022    TSH 1.67 09/18/2022    CRP 74.90 (H) 01/12/2023    SED 39 (H) 06/19/2022       Anesthesia Plan    ASA Status:  4   NPO Status:  NPO Appropriate    Anesthesia Type: Peripheral Nerve Block.              Consents    Anesthesia Plan(s) and associated risks, benefits, and realistic alternatives discussed. Questions answered and patient/representative(s) expressed understanding.     - Discussed: Risks, Benefits and Alternatives for the PROCEDURE were discussed     - Discussed with:  Patient         Postoperative Care            Comments:    Other Comments: Left cryoablation of intercostal nerves T6, 7, 8                Kenroy Nguyen MD, MD

## 2023-01-16 NOTE — ANESTHESIA CARE TRANSFER NOTE
Patient: Dandy Sands    Procedure: Procedure(s):  Cryoablation of intercostal nerves       Diagnosis: Other chronic pain [G89.29]  Diagnosis Additional Information: No value filed.    Anesthesia Type:   Peripheral Nerve Block     Note:    Oropharynx: oropharynx clear of all foreign objects  Level of Consciousness: awake  Oxygen Supplementation: room air    Independent Airway: airway patency satisfactory and stable  Dentition: dentition unchanged  Vital Signs Stable: post-procedure vital signs reviewed and stable  Report to RN Given: handoff report given  Patient transferred to: Phase II    Handoff Report: Identifed the Patient, Identified the Reponsible Provider, Reviewed the pertinent medical history, Discussed the surgical course, Reviewed Intra-OP anesthesia mangement and issues during anesthesia, Set expectations for post-procedure period and Allowed opportunity for questions and acknowledgement of understanding      Vitals:  Vitals Value Taken Time   BP     Temp     Pulse     Resp     SpO2         Electronically Signed By: Kenroy Nguyen MD, MD  January 16, 2023  3:19 PM

## 2023-01-16 NOTE — PROGRESS NOTES
ANTICOAGULATION  MANAGEMENT: Discharge Review    Dandy Sands chart reviewed for anticoagulation continuity of care    Outpatient surgery/procedure on 1/16/23 for Cryoablation.    Discharge disposition: Home    Results:    Recent labs: (last 7 days)     01/10/23  0000 01/12/23  0822 01/13/23  0744   INR 3.7* 3.22* 3.27*     Anticoagulation inpatient management:     not applicable     Anticoagulation discharge instructions:     Warfarin dosing: home regimen continued   Bridging: No   INR goal change: No      Medication changes affecting anticoagulation: No    Additional factors affecting anticoagulation: No    Per chart review no interruption for Warfarin was needed for this procedure and patient is scheduled for an INR on Tuesday 1/17.     PLAN     No adjustment to anticoagulation plan needed    Patient not contacted    Anticoagulation Calendar updated    Riri Giron RN

## 2023-01-16 NOTE — OR NURSING
Cryoablation of left intercostal nerves X 3 performed with local anesthetic, patient tolerated. Transferred to  and report given to recovery RN.

## 2023-01-16 NOTE — PROGRESS NOTES
ANTICOAGULATION  MANAGEMENT: Discharge Review    Dandy Sands chart reviewed for anticoagulation continuity of care    Outpatient surgery/procedure on 1/13/23 for CRT-D Lead revision.    Discharge disposition: Home    Results:    Recent labs: (last 7 days)     01/10/23  0000 01/12/23  0822 01/13/23  0744   INR 3.7* 3.22* 3.27*     Anticoagulation inpatient management:     not applicable     Anticoagulation discharge instructions:     Warfarin dosing: home regimen continued   Bridging: No   INR goal change: No      Medication changes affecting anticoagulation: Yes: Cephalexin 500mg TID for 5 days    Additional factors affecting anticoagulation: No     PLAN     No adjustment to anticoagulation plan needed    Patient not contacted    No adjustment to Anticoagulation Calendar was required    Riri Giron RN

## 2023-01-16 NOTE — ANESTHESIA POSTPROCEDURE EVALUATION
Patient: Dandy Sands    Procedure: Procedure(s):  Cryoablation of intercostal nerves       Anesthesia Type:  Peripheral Nerve Block    Note:  Disposition: Outpatient   Postop Pain Control: Uneventful            Sign Out: Well controlled pain   PONV: No   Neuro/Psych: Uneventful            Sign Out: Acceptable/Baseline neuro status   Airway/Respiratory: Uneventful            Sign Out: Acceptable/Baseline resp. status   CV/Hemodynamics: Uneventful            Sign Out: Acceptable CV status; No obvious hypovolemia; No obvious fluid overload   Other NRE: NONE   DID A NON-ROUTINE EVENT OCCUR? No           Last vitals:  Vitals Value Taken Time   BP     Temp     Pulse     Resp     SpO2         Electronically Signed By: Kenroy Nguyen MD, MD  January 16, 2023  3:21 PM

## 2023-01-17 ENCOUNTER — CARE COORDINATION (OUTPATIENT)
Dept: CARDIOLOGY | Facility: CLINIC | Age: 63
End: 2023-01-17
Payer: COMMERCIAL

## 2023-01-17 LAB
BACTERIA BLD CULT: NO GROWTH
BACTERIA BLD CULT: NO GROWTH

## 2023-01-17 NOTE — PROGRESS NOTES
Called pt today to check in following cryoablation yesterday.   Pt reports doing ok. Pain is better that prior to ablation though not completely gone.   Pt reports he has an appt with Infectious Disease today at [facility]. Called clinic to update on findings from last week, as well as faxed wound and blood culture results, nursing note with picture of wound, and progress note from visit with Dr. Lora.   Culture results are negative and drainage is improving on current regimen. Gave recommendations last week for using betadine swabs and silverlon patch, as well as reinforced the importance of daily dressing changes. Had discussed with pt last week the possibility of transferring ID care to [facility], depending on culture findings. However, with improvement in drainage status and negative cultures, informed pt today that there might not be a need to transfer. At this time, pt prefers to keep his care at [facility].     Pt is 16 weeks post discharge from implant hospitalization. Weight is WNL, VAD parameters at baseline, no alarms. No further questions or concerns at this time.

## 2023-01-18 LAB — BACTERIA WND CULT: NORMAL

## 2023-01-20 ENCOUNTER — CARE COORDINATION (OUTPATIENT)
Dept: CARDIOLOGY | Facility: CLINIC | Age: 63
End: 2023-01-20
Payer: COMMERCIAL

## 2023-01-20 NOTE — LETTER
Faxed to:  [facility]  Fax Number:  [phone]        Patient Name: Dandy Sands    1960   Diagnosis/ICD-9: Heart Failure, unspecified I50.9; LVAD Z95.811   Requesting Physician: Dr. Kelly Lora   Date of Request: 23     **Please fax results to Chantal Brasher RN VAD Coord at [phone].                Call [phone] with Questions    Date ORDERS   23  Pain Clinic referral due to left side intercostal pain. LVAD patient from the [facility] program.                      Signed,      Kelly Lora MD  Heart Failure, Mechanical Circulatory Support and Transplant Cardiology   of Medicine,  Division of Cardiology, [facility]

## 2023-01-20 NOTE — PROGRESS NOTES
"Patient had Intercostal block procedure on 1/16/23. He said his pain was better for a few days and in the last couple of days his pain has returned to the level it was previous to the procedure. Pain is located by his rib cage (anterior) on the left side. However, it can radiate to his back and left shoulder.   He states his pain decreases when he is sitting down to a level of 5/10, but if he sits up or has a bowel movement the pain increases to a level 9/10. The pt states he was unable to sleep last night due to the pain and his ability to eat is also being impacted. He states he has been taking the previously prescribed Valium, but that his PCP recommended he take two tablets (instead of the 1 tablets every 6 hrs as prescribed) due to the level of pain he is experiencing.     The patient denies alarms or issues with LVAD equipment. Denies shortness of breath, dizziness/lighthehdedness, swelling.     Driveline exit site (DLES) status: Per patient it \"look very good\". DLES dressing was changed today. No drainage present. Denies redness or tenderness in that area.     Writer will share this information with the provider (Dr. Kenroy Nguyen) who performed the intercostal block procedure and will request a pain clinic referral from main cardiologist (Dr. Lora).     Writer informed patient that if pain becomes unmanageable or he develops chest pain or worsening heart failure symptoms, he should go to his local ED. Patient verbalized understanding.       Addendum:   Pain Clinic referral sent to [facility] (fax: [phone]).    "

## 2023-01-21 ENCOUNTER — CARE COORDINATION (OUTPATIENT)
Dept: CARDIOLOGY | Facility: CLINIC | Age: 63
End: 2023-01-21
Payer: COMMERCIAL

## 2023-01-21 LAB — INR (EXTERNAL): 2.8 (ref 0.9–1.1)

## 2023-01-21 NOTE — PROGRESS NOTES
S: Amos called notifying coordinator that Dandy was on his way to the ED in [place].  B: He reports getting a voicemail from Dandy saying that he needs help. It took him over an hour to reach him, and when he did, it was unclear what exactly happened, but he was on the ground and slurring his words. Amos called 911 and EMS was bringing him to the emergency department. Amos believes Dandy had fallen last night and had been laying on the ground until EMS showed up.    Amos notes that Dandy has been experiencing a lot of rib cage pain and has been treating it with pain medications. He's worried Dandy had maybe taken too much which made him fall.     A: Agree with plan for Dandy to go to ED.  R: Writer attempted calling [facility] ED to give report, but unable to reach anyone. Instructed Amos to give pager number to provider if they have any questions. Amos verbalized understanding.

## 2023-01-24 ENCOUNTER — TRANSFERRED RECORDS (OUTPATIENT)
Dept: HEALTH INFORMATION MANAGEMENT | Facility: CLINIC | Age: 63
End: 2023-01-24
Payer: COMMERCIAL

## 2023-01-24 ENCOUNTER — TELEPHONE (OUTPATIENT)
Dept: ANTICOAGULATION | Facility: CLINIC | Age: 63
End: 2023-01-24
Payer: COMMERCIAL

## 2023-01-24 LAB — INR (EXTERNAL): 3.2 (ref 0.9–1.1)

## 2023-01-24 NOTE — TELEPHONE ENCOUNTER
ANTICOAGULATION  MANAGEMENT: Discharge Review    Dandy Sands chart reviewed for anticoagulation continuity of care    Hospital Admission on 1/21-24/23 for hypotension,fall and confusion.    Discharge disposition: Home    Results:    Put in calendar.  Anticoagulation inpatient management:     less warfarin administered than maintenance regimen    Anticoagulation discharge instructions:     Warfarin dosing: home regimen continued   Bridging: No   INR goal change: No      Medication changes affecting anticoagulation: No per Pharmacy fax- but pt on minocycline and daptomycin d/c'd    Additional factors affecting anticoagulation: No     PLAN     Agree with discharge plan for follow up on 1/31/23    Patient not contacted    Anticoagulation Calendar updated    Kiki Dave RN

## 2023-01-26 ENCOUNTER — CARE COORDINATION (OUTPATIENT)
Dept: CARDIOLOGY | Facility: CLINIC | Age: 63
End: 2023-01-26
Payer: COMMERCIAL

## 2023-01-26 NOTE — PROGRESS NOTES
Called pt to check in following admission/discharge at [facility] over the weekend. Pt reports feeling much better. He thinks he took too many pain and anxiety meds together. Infectious work-up at [place] was negative.   Pt reports very minimal pain to LL ribcage/ribs currently. He is taking tylenol q8hrs. He is avoiding sleeping on his left side.   Pt also reports DLES looks very good. Minimal to no drainage. He continues to use betadine to clean (which has cleared up the irritation) and silverlon patch. He is getting his dressing changed daily. He had an ID consult while inpt and IV abx were discontinued. He is on po abx BID (minocin 100mg po BID per care everywhere notes) and has ID follow-up next week.   Pt reports his application for a senior living apartment in [place] was accepted and he will likely move this spring. This will allow him to be closer to his kids/support system.   Pt has no questions or concerns at this time.

## 2023-01-30 LAB — INR (EXTERNAL): 3.1 (ref 0.9–1.1)

## 2023-01-31 ENCOUNTER — ANTICOAGULATION THERAPY VISIT (OUTPATIENT)
Dept: ANTICOAGULATION | Facility: CLINIC | Age: 63
End: 2023-01-31
Payer: COMMERCIAL

## 2023-01-31 DIAGNOSIS — Z95.811 LEFT VENTRICULAR ASSIST DEVICE PRESENT (H): ICD-10-CM

## 2023-01-31 DIAGNOSIS — I50.20 HEART FAILURE WITH REDUCED EJECTION FRACTION, NYHA CLASS III (H): ICD-10-CM

## 2023-01-31 DIAGNOSIS — Z95.811 LVAD (LEFT VENTRICULAR ASSIST DEVICE) PRESENT (H): ICD-10-CM

## 2023-01-31 DIAGNOSIS — I50.22 CHRONIC SYSTOLIC CONGESTIVE HEART FAILURE (H): Primary | ICD-10-CM

## 2023-01-31 NOTE — PROGRESS NOTES
ANTICOAGULATION MANAGEMENT     Dandy Sands 62 year old male is on warfarin with supratherapeutic INR result. (Goal INR 2.0-3.0)    Recent labs: (last 7 days)     01/30/23  0900   INR 3.1*       ASSESSMENT       Source(s): Chart Review    Previous INR was Supratherapeutic    Medication, diet, health changes since last INR chart reviewed; none identified           PLAN     Recommended plan for temporary change(s) affecting INR     Dosing Instructions: partial hold then continue your current warfarin dose with next INR in 1 week       Summary  As of 1/31/2023    Full warfarin instructions:  1/31: 5 mg; Otherwise 7.5 mg every day   Next INR check:  2/6/2023             Detailed voice message left for Dandy with dosing instructions and follow up date.     Patient using outside facility for labs    Education provided:     Please call back if any changes to your diet, medications or how you've been taking warfarin    Plan made per [facility] anticoagulation protocol and per LVAD protocol    Chon Tinsley, RN  Anticoagulation Clinic  1/31/2023    _______________________________________________________________________     Anticoagulation Episode Summary     Current INR goal:  2.0-3.0   TTR:  47.9 % (3.7 mo)   Target end date:  Indefinite   Send INR reminders to:  ANTICOAG LVAD    Indications    Chronic systolic congestive heart failure (H) [I50.22]  LVAD (left ventricular assist device) present (H) [Z95.811]  Heart failure with reduced ejection fraction  NYHA class III (H) [I50.20]  Left ventricular assist device present (H) [Z95.811]           Comments:  Follow VAD Anticoag protocol:Yes: HeartMate 3   Bridging: No bridging epistaxis.   Date VAD placed: 7/8/22         Anticoagulation Care Providers     Provider Role Specialty Phone number    Kelly Lora MD Referring Cardiovascular Disease [phone]

## 2023-02-01 NOTE — PROGRESS NOTES
2/1/23 Addendum:  Dandy called--reviewed plan with him. He will take the 5 mg dose of warfarin tonight, since he did not take in 1/31/23.  He reports no changes in health, diet, medications.  Leatha Abbott RN

## 2023-02-02 ENCOUNTER — CARE COORDINATION (OUTPATIENT)
Dept: CARDIOLOGY | Facility: CLINIC | Age: 63
End: 2023-02-02
Payer: COMMERCIAL

## 2023-02-02 DIAGNOSIS — T82.7XXA INFECTION ASSOCIATED WITH DRIVELINE OF LEFT VENTRICULAR ASSIST DEVICE (LVAD) (H): ICD-10-CM

## 2023-02-02 DIAGNOSIS — I50.22 CHRONIC SYSTOLIC CONGESTIVE HEART FAILURE (H): Primary | ICD-10-CM

## 2023-02-02 DIAGNOSIS — Z95.811 LVAD (LEFT VENTRICULAR ASSIST DEVICE) PRESENT (H): ICD-10-CM

## 2023-02-02 NOTE — PROGRESS NOTES
Pt called VAD coordinator today to discuss LVAD drive line exit site (DLES). Pt has been following with ID at [facility]. He reports IV abx were discontinued about 1.5 weeks ago. He continues on po abx. Pt reports he continues to have small amount of green drainage from DLES. Pt is requesting to transfer his ID care to [facility].   This has previously been discussed with Dr. Genao. ID referral placed and message sent to scheduling team. Pt is hoping to see ID on 3/17 when he is here to see the VAD team. He reports he has sufficient po abx to get him through to that time and will continue to take them as prescribed. Requested that pt send a picture of his site via [employer] at his next dressing change. Instructed pt that if infection does get worse he may need to come sooner to be seen in person by ID. Pt verbalized understanding.

## 2023-02-03 ENCOUNTER — TELEPHONE (OUTPATIENT)
Dept: INFECTIOUS DISEASES | Facility: CLINIC | Age: 63
End: 2023-02-03
Payer: COMMERCIAL

## 2023-02-03 NOTE — TELEPHONE ENCOUNTER
EP called 2/3 to schedule new appt with any LVAD ID provider for March 2023.  Pt said he will call back at a later time to schedule.         ----- Message from Zoie Hale RN sent at 2/2/2023 12:04 PM CST -----  Regarding: RE: new referral  Either works!     Thanks,  Stephanie   ----- Message -----  From: Heydi Fortune  Sent: 2/2/2023  12:02 PM CST  To: Zoie Hale RN  Subject: RE: new referral                                 Hi Stephanie,     Does he need to be scheduled for the LVAD clinic or just with an LVAD provider in the ID clinic?    Heydi  ----- Message -----  From: Zoie Hale RN  Sent: 2/2/2023  10:00 AM CST  To: Heydi Fortune  Subject: FW: new referral                                 Hi!    Would you work with this patient to get scheduled? 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 He has been following with [facility] but would like to switch to  [facility]. I placed an ID referral. Can you please double check that he gets scheduled with one of the LVAD ID providers? He is hoping for an appt on 3/17 when he returns to [facility] for other appt's.   Thanks!

## 2023-02-07 ENCOUNTER — CARE COORDINATION (OUTPATIENT)
Dept: CARDIOLOGY | Facility: CLINIC | Age: 63
End: 2023-02-07
Payer: COMMERCIAL

## 2023-02-07 NOTE — PROGRESS NOTES
Pt called VAD Coordinator to notify of new R sided chest pain. Pt reports he has been packing and moving from his house. He thinks this may be what is causing the pain. He is taking Extra Strength Tylenol which seems to help the pain. Encouraged pt to rest as needed ant to use heat and ice to the affected area, and to ask for help with moving.   Pt verbalized understanding.

## 2023-02-14 ENCOUNTER — ANTICOAGULATION THERAPY VISIT (OUTPATIENT)
Dept: ANTICOAGULATION | Facility: CLINIC | Age: 63
End: 2023-02-14
Payer: COMMERCIAL

## 2023-02-14 DIAGNOSIS — Z95.811 LVAD (LEFT VENTRICULAR ASSIST DEVICE) PRESENT (H): ICD-10-CM

## 2023-02-14 DIAGNOSIS — I50.22 CHRONIC SYSTOLIC CONGESTIVE HEART FAILURE (H): Primary | ICD-10-CM

## 2023-02-14 DIAGNOSIS — Z95.811 LEFT VENTRICULAR ASSIST DEVICE PRESENT (H): ICD-10-CM

## 2023-02-14 DIAGNOSIS — I50.20 HEART FAILURE WITH REDUCED EJECTION FRACTION, NYHA CLASS III (H): ICD-10-CM

## 2023-02-14 LAB — INR (EXTERNAL): 2.8 (ref 0.9–1.1)

## 2023-02-14 NOTE — PROGRESS NOTES
ANTICOAGULATION MANAGEMENT     Dandy CHAPA Shavon 62 year old male is on warfarin with therapeutic INR result. (Goal INR 2.0-3.0)    Recent labs: (last 7 days)     02/14/23  0000   INR 2.8*       ASSESSMENT       Source(s): Chart Review and Patient/Caregiver Call       Warfarin doses taken: Warfarin taken as instructed    Diet: No new diet changes identified    New illness, injury, or hospitalization: No    Medication/supplement changes: None noted    Signs or symptoms of bleeding or clotting: No    Previous INR: Supratherapeutic    Additional findings: None       PLAN     Recommended plan for no diet, medication or health factor changes affecting INR     Dosing Instructions: Continue your current warfarin dose with next INR in 1 week       Summary  As of 2/14/2023    Full warfarin instructions:  7.5 mg every day   Next INR check:  2/21/2023             Telephone call with Dandy who verbalizes understanding and agrees to plan and who agrees to plan and repeated back plan correctly    Patient using outside facility for labs    Education provided:     Taking warfarin: Importance of taking warfarin as instructed    Goal range and lab monitoring: goal range and significance of current result and Importance of therapeutic range    Plan made per [facility] anticoagulation protocol and per LVAD protocol    Holli Silva RN  Anticoagulation Clinic  2/14/2023    _______________________________________________________________________     Anticoagulation Episode Summary     Current INR goal:  2.0-3.0   TTR:  50.4 % (4.2 mo)   Target end date:  Indefinite   Send INR reminders to:  ANTICOAG LVAD    Indications    Chronic systolic congestive heart failure (H) [I50.22]  LVAD (left ventricular assist device) present (H) [Z95.811]  Heart failure with reduced ejection fraction  NYHA class III (H) [I50.20]  Left ventricular assist device present (H) [Z95.811]           Comments:  Follow VAD Anticoag protocol:Yes: HeartMate 3   Bridging: No  bridging epistaxis.   Date VAD placed: 7/8/22         Anticoagulation Care Providers     Provider Role Specialty Phone number    Kelly Lora MD Referring Cardiovascular Disease [phone]

## 2023-02-16 ENCOUNTER — TELEPHONE (OUTPATIENT)
Dept: RHEUMATOLOGY | Facility: CLINIC | Age: 63
End: 2023-02-16
Payer: COMMERCIAL

## 2023-02-16 ENCOUNTER — CARE COORDINATION (OUTPATIENT)
Dept: CARDIOLOGY | Facility: CLINIC | Age: 63
End: 2023-02-16
Payer: COMMERCIAL

## 2023-02-16 NOTE — PROGRESS NOTES
Called patient/caregiver to check in 20 weeks post discharge. Pt reports VAD parameters WNL and weight stable. Reviewed medications and answered any questions. Patient reports sleeping well and no anxiety since being home with LVAD. Patient is able to move around the house and care for himself independently.    Discussed specific new problems/stressors since being discharged from the hospital: none at this time. Pt has moved to a new apartment closer to his kids. Address updated. Empathized with patient and reviewed coping strategies: enlisting support from friends and love ones, attending patient and caregiver support groups, reviewing LVAD educational materials to reinforce knowledge, and talking about concerns with family/care providers/trusted others. Encouraged pt to page VAD Coordinator with any issues or questions. Pt verbalizes understanding.

## 2023-02-17 NOTE — TELEPHONE ENCOUNTER
Current Issues/Problems reviewed on call:Mr. Fagan,denies headache,dizziness, difficulty breathing, chest pain, swelling. Mr. Fagan verbalized feeling well,  taking medications, understands action plan     Details for Interventions/Education completed on call: CM     Screening completed for  COVID -19 using the [facility] Symptom . Screening is Negative for symptoms    Time Frame for Follow up:  Within No future follow up needed due to Outcome met    Plan for Next Call:n/a    Care Plan reviewed with Patient  and he agrees with the plan of care and the call follow up plan.      Care Plan Reviewed with Dr. Houston   on 2/10/23  via EMR     RECORDS RECEIVED FROM:   DATE RECEIVED:   NOTES STATUS DETAILS   OFFICE NOTE from referring provider    Internal    OFFICE NOTE from other cardiologist    Internal Dr. Lora 10-27-22   SUMMARY from hospital/ED   Care Everywhere [facility] 11-6-22   MEDICATION LIST   Internal    DIAGNOSTIC PROCEDURES     EKG   Internal 10-28-22   Monitor Reports   N/A    IMAGING (DISC & REPORT)      Echo   In process 11-6-22 Requested   Stress Tests   N/A    Cath   Internal 9-23-22   MRI/MRA   N/A    CT/CTA   Internal 10-27-22 CT Chest     Action 12/5/22 [facility]  Fax #[phone]   Action Taken Requested:    CT Chest    Echo   11-6-22 11-6-22, 6-14-22     EKGs viewable in CE    Resolved images in PACS 12/7      nothing/activity

## 2023-02-24 NOTE — TELEPHONE ENCOUNTER
RECORDS RECEIVED FROM: Internal   DATE RECEIVED: 3.16.23   NOTES (Gather within 2 years) STATUS DETAILS   OFFICE NOTE from referring provider   Internal 2.2.23, 1.26.23  [facility]  Cardiology   OFFICE NOTE from other specialist CE 1.30.23, 1.20.23, 11.21.22  [facility] FM    1.17.23  [facility] ID    1.12.23  [facility]  Cardiology    12.29.22, 12.1.22  [facility]    12.12.22  [facility]  Cardiology    + more related encounters   DISCHARGE SUMMARY from hospital CE 1.21-1.24.23  [facility]   DISCHARGE REPORT from the ER CE 12.27.22  [facility] ER   LABS (any labs) CE/Internal    MEDICATION LIST CE/Internal    IMAGING  (NEED IMAGES AND REPORTS)     Osteomyelitis: Foot imaging      Liver Abscess: Abdominal imagineg     Other (anything related to diagnoses Internal 1.13.23, 11.1.22, 10.27.22  XR Chest    12.12.22, 10.27.22  CT CAP

## 2023-02-28 ENCOUNTER — ANTICOAGULATION THERAPY VISIT (OUTPATIENT)
Dept: ANTICOAGULATION | Facility: CLINIC | Age: 63
End: 2023-02-28
Payer: COMMERCIAL

## 2023-02-28 ENCOUNTER — CARE COORDINATION (OUTPATIENT)
Dept: CARDIOLOGY | Facility: CLINIC | Age: 63
End: 2023-02-28
Payer: COMMERCIAL

## 2023-02-28 DIAGNOSIS — I50.22 CHRONIC SYSTOLIC CONGESTIVE HEART FAILURE (H): Primary | ICD-10-CM

## 2023-02-28 DIAGNOSIS — Z95.811 LEFT VENTRICULAR ASSIST DEVICE PRESENT (H): ICD-10-CM

## 2023-02-28 DIAGNOSIS — Z95.811 LVAD (LEFT VENTRICULAR ASSIST DEVICE) PRESENT (H): ICD-10-CM

## 2023-02-28 DIAGNOSIS — I50.20 HEART FAILURE WITH REDUCED EJECTION FRACTION, NYHA CLASS III (H): ICD-10-CM

## 2023-02-28 LAB — INR (EXTERNAL): 4.7 (ref 0.9–1.1)

## 2023-02-28 NOTE — PROGRESS NOTES
ANTICOAGULATION MANAGEMENT     Dandy Sands 62 year old male is on warfarin with supratherapeutic INR result. (Goal INR 2.0-3.0)    Recent labs: (last 7 days)     02/28/23  0000   INR 4.7*       ASSESSMENT       Source(s): Patient/Caregiver Call       Warfarin doses taken: Warfarin taken as instructed    Diet: No new diet changes identified    New illness, injury, or hospitalization: No    Medication/supplement changes: None noted    Signs or symptoms of bleeding or clotting: No    Previous INR: Therapeutic last visit; previously outside of goal range    Additional findings: None         PLAN     Recommended plan for no diet, medication or health factor changes affecting INR     Dosing Instructions: hold dose then decrease your warfarin dose (9.5% change) with next INR in 1 week       Summary  As of 2/28/2023    Full warfarin instructions:  2/28: Hold; Otherwise 5 mg every Wed, Fri; 7.5 mg all other days   Next INR check:  3/10/2023             Telephone call with Dandy who verbalizes understanding and agrees to plan and who agrees to plan and repeated back plan correctly    Patient using outside facility for labs    Education provided:     None required    Plan made per [facility] anticoagulation protocol and per LVAD protocol    Riri Giron, RN  Anticoagulation Clinic  2/28/2023    _______________________________________________________________________     Anticoagulation Episode Summary     Current INR goal:  2.0-3.0   TTR:  46.4 % (4.6 mo)   Target end date:  Indefinite   Send INR reminders to:  ANTICOAG LVAD    Indications    Chronic systolic congestive heart failure (H) [I50.22]  LVAD (left ventricular assist device) present (H) [Z95.811]  Heart failure with reduced ejection fraction  NYHA class III (H) [I50.20]  Left ventricular assist device present (H) [Z95.811]           Comments:  Follow VAD Anticoag protocol:Yes: HeartMate 3   Bridging: No bridging epistaxis.   Date VAD placed: 7/8/22         Anticoagulation  Care Providers     Provider Role Specialty Phone number    Kelly Lora MD Referring Cardiovascular Disease [phone]

## 2023-02-28 NOTE — PROGRESS NOTES
Pt's son contacted VAD Coordinator with reports that pt's L chest/rib pain is worsening again. They initially requested an appointment with Dr. Zamora to review options, but have decided to request referral for pain management instead.   Called pt to check in. Pt reports pain is worsening again and radiating to back. He has been packing up his house for a move to an apartment, but doesn't feel like he has been overdoing activity and hasn't been lifting any of the boxes. He confirms that he would like to see a pain specialist to be prescribed medications for pain.   Called [facility] to discuss if they can follow pt. They typically see pt's for blocks and injections, but don't follow pts for po pain meds. The [facility] has closed. Scheduling department suggesting reaching out to pt's PCP to see if they will prescribe for pain management.   Called pt's pcp and left message with request for call back.

## 2023-03-01 ENCOUNTER — MYC MEDICAL ADVICE (OUTPATIENT)
Dept: CARDIOLOGY | Facility: CLINIC | Age: 63
End: 2023-03-01
Payer: COMMERCIAL

## 2023-03-01 ENCOUNTER — CARE COORDINATION (OUTPATIENT)
Dept: CARDIOLOGY | Facility: CLINIC | Age: 63
End: 2023-03-01
Payer: COMMERCIAL

## 2023-03-01 DIAGNOSIS — I50.22 CHRONIC SYSTOLIC CONGESTIVE HEART FAILURE (H): Primary | ICD-10-CM

## 2023-03-07 NOTE — PROGRESS NOTES
Pt sent [employer] message stating he saw his PCP today to discuss pain control option. On review of notes in care everywhere, PCP prescribed oxycodone IR and gabapentin for pt, and requested that pt call their clinic in 1 week to update on how this manages his pain.

## 2023-03-08 LAB — INR (EXTERNAL): 2.4 (ref 0.9–1.1)

## 2023-03-09 ENCOUNTER — ANTICOAGULATION THERAPY VISIT (OUTPATIENT)
Dept: ANTICOAGULATION | Facility: CLINIC | Age: 63
End: 2023-03-09
Payer: COMMERCIAL

## 2023-03-09 DIAGNOSIS — Z95.811 LVAD (LEFT VENTRICULAR ASSIST DEVICE) PRESENT (H): ICD-10-CM

## 2023-03-09 DIAGNOSIS — I50.22 CHRONIC SYSTOLIC CONGESTIVE HEART FAILURE (H): Primary | ICD-10-CM

## 2023-03-09 DIAGNOSIS — I50.20 HEART FAILURE WITH REDUCED EJECTION FRACTION, NYHA CLASS III (H): ICD-10-CM

## 2023-03-09 DIAGNOSIS — Z95.811 LEFT VENTRICULAR ASSIST DEVICE PRESENT (H): ICD-10-CM

## 2023-03-09 NOTE — PROGRESS NOTES
ANTICOAGULATION MANAGEMENT     Dandy Sands 62 year old male is on warfarin with therapeutic INR result. (Goal INR 2.0-3.0)    Recent labs: (last 7 days)     03/08/23  1058   INR 2.4*       ASSESSMENT       Source(s): Chart Review and Patient/Caregiver Call       Warfarin doses taken: Missed dose(s) may be affecting INR and Held for supratherapeutic INR  recently which may be affecting INR    Diet: No new diet changes identified    New illness, injury, or hospitalization: No    Medication/supplement changes: None noted    Signs or symptoms of bleeding or clotting: No    Previous INR: Supratherapeutic    Additional findings: None         PLAN     Recommended plan for temporary change(s) and ongoing change(s) affecting INR     Dosing Instructions: Continue your current warfarin dose with next INR in 1 week       Summary  As of 3/9/2023    Full warfarin instructions:  5 mg every Wed, Fri; 7.5 mg all other days   Next INR check:  3/17/2023             Telephone call with Dandy who agrees to plan and repeated back plan correctly    Check at provider office visit    Education provided:     Taking warfarin: Importance of taking warfarin as instructed    Symptom monitoring: monitoring for bleeding signs and symptoms and monitoring for clotting signs and symptoms    Contact [phone] with any changes, questions or concerns.     Plan made per [facility] anticoagulation protocol and per LVAD protocol    LORENA MOSES RN  Anticoagulation Clinic  3/9/2023    _______________________________________________________________________     Anticoagulation Episode Summary     Current INR goal:  2.0-3.0   TTR:  45.6 % (4.9 mo)   Target end date:  Indefinite   Send INR reminders to:  ANTICOAG LVAD    Indications    Chronic systolic congestive heart failure (H) [I50.22]  LVAD (left ventricular assist device) present (H) [Z95.811]  Heart failure with reduced ejection fraction  NYHA class III (H) [I50.20]  Left ventricular assist device present  (H) [Z75.091]           Comments:  Follow VAD Anticoag protocol:Yes: HeartMate 3   Bridging: No bridging epistaxis.   Date VAD placed: 7/8/22         Anticoagulation Care Providers     Provider Role Specialty Phone number    Kelly Lora MD Referring Cardiovascular Disease [phone]

## 2023-03-10 DIAGNOSIS — I50.22 CHRONIC SYSTOLIC CONGESTIVE HEART FAILURE (H): ICD-10-CM

## 2023-03-10 DIAGNOSIS — Z95.811 LEFT VENTRICULAR ASSIST DEVICE PRESENT (H): ICD-10-CM

## 2023-03-10 DIAGNOSIS — Z79.899 LONG TERM USE OF DRUG: ICD-10-CM

## 2023-03-12 ENCOUNTER — CARE COORDINATION (OUTPATIENT)
Dept: CARDIOLOGY | Facility: CLINIC | Age: 63
End: 2023-03-12
Payer: COMMERCIAL

## 2023-03-12 NOTE — PROGRESS NOTES
"S: Patient called reporting bruising and a lump on his chest \"where the incision was\"    B: Told states that he fell a few days ago hitting his chest. Over the last two days, a lump and a bruise appeared. It is very painful.     A: All VAD parameters normal. No heart failure symptoms. Painful  lump on his chest     R: recommended patient go to the ED for imaging to rule out any internal damage. Report given to [place] ED.   "

## 2023-03-13 ENCOUNTER — CARE COORDINATION (OUTPATIENT)
Dept: CARDIOLOGY | Facility: CLINIC | Age: 63
End: 2023-03-13
Payer: COMMERCIAL

## 2023-03-13 NOTE — PROGRESS NOTES
Called patient regarding his ED visit and the [employer] message from his son Amos. Patient is in a lot of pain when he moves or touches the area. [facility] did not see any acute injury to his sternum, however writer has images being pushed to our radiology department for our cardiovascular surgeon to see. Picture below sent from Amos via [email or web address].     Recommended patient not do any strenuous activity, take ibuprofen and ice if tolerated. Will follow up when surgeon sees images. Patient verbalized understanding.   
DISCHARGE

## 2023-03-14 ENCOUNTER — CARE COORDINATION (OUTPATIENT)
Dept: CARDIOLOGY | Facility: CLINIC | Age: 63
End: 2023-03-14
Payer: COMMERCIAL

## 2023-03-14 NOTE — PROGRESS NOTES
Patient called VAD Coordinator on call regarding ongoing pain on his chest. Patient had called yesterday, and was seen at [facility] for evaluation. Patient called again today to report they would not be coming for their follow-up appointments here in the [facility] if his chest pain would not be addressed. This writer assured patient that any concerns would be addressed during clinic visit, and he should come here to be further evaluated by the cardiology team. Patient agreed to come to appointments.

## 2023-03-15 NOTE — PROGRESS NOTES
Called patient/caregiver to check in 24 weeks post discharge. Pt reports VAD parameters WNL and weight stable. Reviewed medications and answered any questions. Patient reports sleeping ok and no anxiety since being home with LVAD. Patient is able to move around the house and care for himself independently.     Discussed specific new problems/stressors since being discharged from the hospital: checked in with pt regarding recent fall and new pain and bruising to sternum. Pt reports acute pain. The gabapentin and oxy that were prescribed by his PCP 2 weeks ago are helping with the rib pain, but this new pain is quite severe. Pt is adamant to have provider address issue. CT images pushed to [facility] and reviewed by Dr. Zamora. MD added pt on to clinic schedule for 3/16.   Empathized with patient and reviewed coping strategies: enlisting support from friends and love ones, attending patient and caregiver support groups, reviewing LVAD educational materials to reinforce knowledge, and talking about concerns with family/care providers/trusted others. Encouraged pt to page VAD Coordinator with any issues or questions. Pt verbalizes understanding.

## 2023-03-16 ENCOUNTER — OFFICE VISIT (OUTPATIENT)
Dept: INFECTIOUS DISEASES | Facility: CLINIC | Age: 63
End: 2023-03-16
Attending: INTERNAL MEDICINE
Payer: COMMERCIAL

## 2023-03-16 ENCOUNTER — PRE VISIT (OUTPATIENT)
Dept: INFECTIOUS DISEASES | Facility: CLINIC | Age: 63
End: 2023-03-16
Payer: COMMERCIAL

## 2023-03-16 VITALS — WEIGHT: 155 LBS | OXYGEN SATURATION: 98 % | HEART RATE: 59 BPM | BODY MASS INDEX: 24.28 KG/M2 | TEMPERATURE: 97.6 F

## 2023-03-16 DIAGNOSIS — T82.7XXA INFECTION ASSOCIATED WITH DRIVELINE OF LEFT VENTRICULAR ASSIST DEVICE (LVAD) (H): Primary | ICD-10-CM

## 2023-03-16 PROCEDURE — G0463 HOSPITAL OUTPT CLINIC VISIT: HCPCS | Performed by: INTERNAL MEDICINE

## 2023-03-16 PROCEDURE — 99215 OFFICE O/P EST HI 40 MIN: CPT | Mod: GC | Performed by: INTERNAL MEDICINE

## 2023-03-16 PROCEDURE — 87070 CULTURE OTHR SPECIMN AEROBIC: CPT | Performed by: INTERNAL MEDICINE

## 2023-03-16 NOTE — LETTER
3/16/2023       RE: Dandy Sands  [address] [address]     Dear Colleague,    Thank you for referring your patient, Dandy Sands, to the [facility] [place] at [facility]. Please see a copy of my visit note below.    [facility]  New Patient Visit  3/16/2023     Chief Complaint:      HPI:  Dandy Sands is a 62 year old male with a history of SLE, antiphospholipid syndrome, CAD, CHF 2/2 ICM s/p LVAD 7/8/22, history of C.diff who presents today for management of chronic driveline infection.  Patient has had a complex history since his initial LVAD placement in 7/2022.  He was hospitalized 6/17/22 - 8/21/22 for decompensated heart failure complicated by cardiogenic shock leading to LVAD placement.  He was in acute rehab from 8/21/22 - 9/4/22.  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 He was treated with Vancomycin inpatient and ultimately transitioned to Daptomycin for ease of dosing for 2-4 weeks.  He was subsequently placed on minocycline for suppression.  He developed increased drainage in 12/2022 and received another course of IV Daptomycin for 2 weeks.  He was hospitalized 1/21 - 1/24 for dizziness and transitioned back to PO minocycline by infectious disease.      Patient has been maintained on PO minocycline with controlled drainage.  He has been changing his dressings daily with scant drainage from the driveline.  No systemic symptoms.  Tolerating minocycline.      LVAD History:  Current LVAD model: Heartmate III    Date current LVAD placed: 7/8/22    Previous LVAD devices: unknown    Other prosthetic devices/materials:  none     Primary cardiologist: Kelly Lora MD    Primary LVAD coordinator: Chantal Brasher, RN    Primary ID provider: [facility] (Tony Latif, Aishwarya, Phuong, and Zoltan)    History of bacteremias  (dates and organisms):  -Actinomyces 11/6/22     History of driveline infections (dates and organisms):   -10/2022 Corynebacterium striatum    History of other pertinent infections:   -Hx C.diff 2022    History of driveline area irritation and current mitigation strategies:  -Currently performing daily dressing changes    Current suppressive antibiotics:   -Minocycline    Previous antibiotic failures/allergies/intolerances etc:  -none    Transplant Plan:    -Transplant evaluation      ROS: Complete 12-point ROS is negative except as noted above.    Past Medical History:  Past Medical History:   Diagnosis Date     Antiphospholipid antibody syndrome (H)      CAD (coronary artery disease)      Chronic systolic heart failure (H)      Ischemic cardiomyopathy      Mitral regurgitation      Systemic lupus erythematosus (H)        Past Surgical History:  Past Surgical History:   Procedure Laterality Date     COLONOSCOPY N/A 05/23/2022    Procedure: COLONOSCOPY;  Surgeon: Parth Meneses MD;  Location: [facility] OR     CV CORONARY ANGIOGRAM N/A 05/24/2022    Procedure: Coronary Angiogram;  Surgeon: Mike Pope MD;  Location:  HEART CARDIAC CATH LAB     CV CORONARY ANGIOGRAM N/A 05/29/2022    Procedure: Coronary Angiogram;  Surgeon: Austin Bright MD;  Location:  HEART CARDIAC CATH LAB     CV CORONARY LITHOTRIPSY PCI Bilateral 05/24/2022    Procedure: Percutaneous Coronary Intervention - Lithotripsy;  Surgeon: Mike Pope MD;  Location: [facility] CARDIAC CATH LAB     CV INTRA AORTIC BALLOON N/A 06/17/2022    Procedure: Intraprocedure Aortic Balloon Pump Insertion;  Surgeon: Sherman Cerda MD;  Location:  HEART CARDIAC CATH LAB     CV INTRA AORTIC BALLOON Right 07/03/2022    Procedure: Reposition of Subclavian Intraprocedure Aortic Balloon Pump;  Surgeon: Austin Bright MD;  Location: [facility] CARDIAC CATH LAB     CV INTRAVASULAR ULTRASOUND N/A 05/24/2022    Procedure: Intravascular  Ultrasound;  Surgeon: Mike Pope MD;  Location:  HEART CARDIAC CATH LAB     CV PCI ANGIOPLASTY N/A 05/29/2022    Procedure: Percutaneous Transluminal Angioplasty;  Surgeon: Austin Bright MD;  Location:  HEART CARDIAC CATH LAB     CV PCI STENT DRUG ELUTING N/A 05/24/2022    Procedure: Percutaneous Coronary Intervention Stent;  Surgeon: Mike Pope MD;  Location:  HEART CARDIAC CATH LAB     CV RIGHT HEART CATH MEASUREMENTS RECORDED N/A 05/18/2022    Procedure: Right Heart Catheterization;  Surgeon: Justo Stewart MD;  Location:  HEART CARDIAC CATH LAB     CV RIGHT HEART CATH MEASUREMENTS RECORDED N/A 05/24/2022    Procedure: Right Heart Catheterization;  Surgeon: Mike Pope MD;  Location:  HEART CARDIAC CATH LAB     CV RIGHT HEART CATH MEASUREMENTS RECORDED N/A 05/29/2022    Procedure: Right Heart Catheterization;  Surgeon: Austin Bright MD;  Location:  HEART CARDIAC CATH LAB     CV RIGHT HEART CATH MEASUREMENTS RECORDED N/A 07/01/2022    Procedure: Right Heart Catheterization;  Surgeon: Mike Pope MD;  Location:  HEART CARDIAC CATH LAB     CV RIGHT HEART CATH MEASUREMENTS RECORDED N/A 09/23/2022    Procedure: Right Heart Catheterization;  Surgeon: Justo Stewart MD;  Location:  HEART CARDIAC CATH LAB     CV RIGHT HEART EXERCISE STRESS STUDY N/A 05/18/2022    Procedure: Stress Drug Study;  Surgeon: Justo Stewart MD;  Location:  HEART CARDIAC CATH LAB     CV SWAN CHOCO PROCEDURE N/A 06/17/2022    Procedure: [place] Procedure;  Surgeon: Sherman Cerda MD;  Location:  HEART CARDIAC CATH LAB     EP PACEMAKER OR ICD LEAD IMPLANT-ONE LEAD N/A 1/13/2023    Procedure: CRT-D Lead revision;  Surgeon: Beckie Maurice MD;  Location:  HEART CARDIAC CATH LAB     INCISION AND DRAINAGE CHEST WASHOUT, COMBINED N/A 07/11/2022    Procedure: Chest washout and closure;  Surgeon: Danrell Morgan MD;  Location:  OR     INCISION AND DRAINAGE CHEST WASHOUT, COMBINED N/A  07/12/2022    Procedure: INCISION AND DRAINAGE, WOUND, CHEST, WITH IRRIGATION;  Surgeon: Darius Zamora MD;  Location: [facility] OR     INJECT NERVE OTHER PERIPHERAL Left 1/16/2023    Procedure: Cryoablation of intercostal nerves;  Surgeon: Kenroy Nguyen MD;  Location: [facility] GI     INSERT ARTERIAL LINE  07/18/2022          INSERT INTRAAORTIC BALLOON PUMP Right 06/23/2022    Procedure: INSERTION, INTRA-AORTIC BALLOON PUMP RIGHT SUBCLAVIAN ARTERY.;  Surgeon: Hayden Veras MD;  Location: [facility] OR     INSERT VENTRICULAR ASSIST DEVICE LEFT (HEARTMATE II/III) MINIMALLY INVASIVE N/A 07/08/2022    Procedure: MEDIAN STERNOTOMY.  CARDIOPULMONARY BYPASS.  INTRAOPERATIVE TRANSESOPHAGEAL ECHOCARDIOGRAM.  IMPLANT LEFT VENTRICULAR ASSIST DEVICE HEARTMATE III.  PLACEMENT OF PERCUTANEOUS RIGHT VENTRICULAR ASSIST DEVICE.  TEMPORARY CHEST CLOSURE;  Surgeon: Darius Zamora MD;  Location: [facility] OR     IR CHEST TUBE PLACEMENT NON-TUNNELED RIGHT  08/01/2022     IRRIGATION AND DEBRIDEMENT CHEST WASHOUT, COMBINED N/A 07/13/2022    Procedure: IRRIGATION AND DEBRIDEMENT, CHEST;  Surgeon: Darius Zamora MD;  Location: [facility] OR     MIDLINE DOUBLE LUMEN PLACEMENT Left 09/11/2022    Mid line ok to use     MIDLINE DOUBLE LUMEN PLACEMENT Right 09/14/2022    5FR DL midline.     PICC DOUBLE LUMEN PLACEMENT Right 05/28/2022    right basilic 5 fr dl picc 40 cm     PICC DOUBLE LUMEN PLACEMENT Right 08/15/2022    Right Lateral, 41 total length, 3 cm external length     PICC SINGLE LUMEN PLACEMENT Right 11/01/2022    41cm (2cm external), Lateral brachial vein, low SVC       Social History:  Social History     Socioeconomic History     Marital status: Single     Spouse name: Not on file     Number of children: Not on file     Years of education: Not on file     Highest education level: Not on file   Occupational History     Not on file   Tobacco Use     Smoking status: Former     Smokeless tobacco: Never   Substance and Sexual Activity      Alcohol use: Not on file     Drug use: Not on file     Sexual activity: Not on file   Other Topics Concern     Not on file   Social History Narrative     Not on file     Social Determinants of Health     Financial Resource Strain: Not on file   Food Insecurity: Not on file   Transportation Needs: Not on file   Physical Activity: Not on file   Stress: Not on file   Social Connections: Not on file   Intimate Partner Violence: Not on file   Housing Stability: Not on file       Family Medical History:  No family history on file.    Allergies:   No Known Allergies    Medications:  Current Outpatient Medications   Medication Sig Dispense Refill     acetaminophen (TYLENOL) 325 MG tablet Take 3 tablets (975 mg) by mouth every 8 hours as needed for mild pain 270 tablet 0     amoxicillin (AMOXIL) 500 MG capsule Take 4 capsules (2,000 mg) by mouth as needed (take 1 hour prior to any dental procedures) 4 capsule 3     aspirin (ASA) 81 MG EC tablet Take 1 tablet (81 mg) by mouth daily       atorvastatin (LIPITOR) 40 MG tablet Take 1 tablet (40 mg) by mouth daily 30 tablet 0     cephalexin 500 MG tablet Take 500 mg by mouth 3 times daily 15 tablet 0     diazepam (VALIUM) 2 MG tablet Take 1 tablet (2 mg) by mouth every 6 hours as needed for anxiety 30 tablet 0     digoxin (LANOXIN) 125 MCG tablet Take 1 tablet (125 mcg) by mouth daily 90 tablet 3     famotidine (PEPCID) 20 MG tablet Take 1 tablet (20 mg) by mouth 2 times daily 60 tablet 11     furosemide (LASIX) 20 MG tablet Take 1 tablet (20 mg) by mouth daily as needed (For weight gain or shortness of breath) 30 tablet 0     hydrocortisone 1 % CREA cream Place 1 g rectally daily as needed for itching 1 g 0     hydroxychloroquine (PLAQUENIL) 200 MG tablet Take 1 tablet (200 mg) by mouth 2 times daily 60 tablet 4     lidocaine (LIDODERM) 5 % patch Place 1 patch onto the skin every 24 hours To prevent lidocaine toxicity, patient should be patch free for 12 hrs daily. 3 patch 3      lisinopril (ZESTRIL) 10 MG tablet Take 1 tablet (10 mg) by mouth 2 times daily 180 tablet 3     loperamide (IMODIUM) 2 MG capsule Take 1 capsule (2 mg) by mouth 2 times daily as needed for diarrhea 30 capsule 0     melatonin 5 MG tablet Take 1 tablet (5 mg) by mouth At Bedtime 30 tablet 0     methylcellulose (CITRUCEL) 500 MG TABS tablet Take 2 tablets (1,000 mg) by mouth 2 times daily 60 tablet 0     multivitamin w/minerals (THERA-VIT-M) tablet Take 1 tablet by mouth daily  0     pramox-pe-glycerin-petrolatum (PREPARATION H) 1-0.25-14.4-15 % CREA cream Place rectally 2 times daily as needed for hemorrhoids Apply immediately after a bowel movement. 51 g 0     tamsulosin (FLOMAX) 0.4 MG capsule Take 1 capsule (0.4 mg) by mouth daily 30 capsule 0     warfarin ANTICOAGULANT (COUMADIN) 2.5 MG tablet Take 5mg on Sunday, Tuesday, Thursday and 7.5mg all other days, or as directed by anticoagulation clinic 192 tablet 3     minocycline (MINOCIN) 100 MG capsule Take 1 capsule by mouth 2 times daily (before meals) (Patient not taking: Reported on 3/16/2023)       saline nasal (AYR SALINE) GEL topical gel Apply 1 applicator into each nare nightly as needed for congestion (Patient not taking: Reported on 3/16/2023)         Immunizations:  Immunization History   Administered Date(s) Administered     COVID-19 Vaccine 18+ (Moderna) 03/13/2021, 04/10/2021, 08/24/2021     COVID-19 Vaccine Bivalent Booster 12+ (Pfizer) 01/20/2023     Influenza Vaccine >6 months (Alfuria,Fluzone) 12/17/2021     Pneumo Conj 13-V (2010&after) 04/09/2018     Tdap (Adult) Unspecified Formulation 03/14/2016     Zoster vaccine recombinant adjuvanted (SHINGRIX) 08/16/2019, 10/18/2019       Exam:  Pulse 59   Temp 97.6  F (36.4  C) (Oral)   Wt 70.3 kg (155 lb)   SpO2 98%   BMI 24.28 kg/m    Gen: Alert and in no distress.   Psych: Normal affect. Alert and oriented.   HEENT: EOMI. No icterus. Moist mucous membranes   Neck: No lymphadenopathy.   CV: Regular  rate    Chest: Non-labored breathing, symmetric chest rise.   Abdomen: Soft, non-distended. Erythematous nodule near epigastrium  Extremities: Warm and well perfused.   Skin: No rashes or lesions noted.     Labs:  WBC Count   Date Value Ref Range Status   01/13/2023 7.1 4.0 - 11.0 10e3/uL Final       CRP Inflammation   Date Value Ref Range Status   06/19/2022 28.0 (H) 0.0 - 8.0 mg/L Final       Creatinine   Date Value Ref Range Status   01/13/2023 0.85 0.67 - 1.17 mg/dL Final   01/12/2023 0.80 0.67 - 1.17 mg/dL Final   12/12/2022 0.80 0.67 - 1.17 mg/dL Final       Assessment and Plan:  Dandy Sands is a 62 year old male with a history of SLE, antiphospholipid syndrome, CAD, CHF 2/2 ICM s/p LVAD 7/8/22, history of C.diff who presents today for management of chronic driveline infection.    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  He is currently on minocycline for suppression.  He has been changing his dressing daily due to concerns of drainage with his daughter and son helping. He does present with a peculiar erythematous nodule in his epigastrium region over a rib.  It is unclear the relevance of this as he does report trauma associated with this; however, it is over a site that would make me concerned about possible infection.  Will have him review this with his surgeon. Given his recurrent infections, will plan on placing him on suppressive therapy.  Will obtain new cultures today and keep him on minocycline for now.      Plan  -Surgical evaluation of erythematous nodule in epigastrium/rib region  -Obtained cultures of driveline today  -Continue PO minocycline for suppressive therapy  -Ok to change dressings every other day        Case discussed with Dr. Zoltan Triplett DO, MPH  Infectious Disease Fellow  Pager:  [phone]  InderjitGower preferred          Attestation signed by Fariad Charlton MD at 3/17/2023 12:20 PM:  Attestation:    I have seen and evaluated the patient and have discussed his/her care with the fellow. I agree with the documented findings and plan. I have reviewed today's vital signs, medications, labs and imaging.    Mr. Sands has a history of relapsing C striatum driveline infection and now presents with a very tender erythematous area of swelling along his driveline track. This was thought to be a contusion related to a recent fall and CT did not see any fluid collection. However, the appearance and location remain to me very suspicious for a deep driveline infection with impending cutaneous fistula. He is awaiting evaluation by Dr. Zamora tomorrow morning.     I spent 45 minutes as part of a visit on the date of the encounter doing chart review, history and exam, documentation, and care coordination.     Farida Charlton MD  Infectious Diseases  Pager: [phone]

## 2023-03-16 NOTE — PROGRESS NOTES
[facility]  New Patient Visit  3/16/2023     Chief Complaint:      HPI:  Dandy Sands is a 62 year old male with a history of SLE, antiphospholipid syndrome, CAD, CHF 2/2 ICM s/p LVAD 7/8/22, history of C.diff who presents today for management of chronic driveline infection.  Patient has had a complex history since his initial LVAD placement in 7/2022.  He was hospitalized 6/17/22 - 8/21/22 for decompensated heart failure complicated by cardiogenic shock leading to LVAD placement.  He was in acute rehab from 8/21/22 - 9/4/22.  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 He was treated with Vancomycin inpatient and ultimately transitioned to Daptomycin for ease of dosing for 2-4 weeks.  He was subsequently placed on minocycline for suppression.  He developed increased drainage in 12/2022 and received another course of IV Daptomycin for 2 weeks.  He was hospitalized 1/21 - 1/24 for dizziness and transitioned back to PO minocycline by infectious disease.      Patient has been maintained on PO minocycline with controlled drainage.  He has been changing his dressings daily with scant drainage from the driveline.  No systemic symptoms.  Tolerating minocycline.      LVAD History:  Current LVAD model: Heartmate III    Date current LVAD placed: 7/8/22    Previous LVAD devices: unknown    Other prosthetic devices/materials:  none     Primary cardiologist: Kelly Lora MD    Primary LVAD coordinator: Chantal Brasher RN    Primary ID provider: [facility] (Tony Latif, Aishwarya, Phuong, and Zoltan)    History of bacteremias (dates and organisms):  -Actinomyces 11/6/22     History of driveline infections (dates and organisms):   -10/2022 Corynebacterium striatum    History of other pertinent infections:   -Hx C.diff 2022    History of driveline area irritation and current mitigation strategies:  -Currently performing daily dressing  changes    Current suppressive antibiotics:   -Minocycline    Previous antibiotic failures/allergies/intolerances etc:  -none    Transplant Plan:    -Transplant evaluation      ROS: Complete 12-point ROS is negative except as noted above.    Past Medical History:  Past Medical History:   Diagnosis Date     Antiphospholipid antibody syndrome (H)      CAD (coronary artery disease)      Chronic systolic heart failure (H)      Ischemic cardiomyopathy      Mitral regurgitation      Systemic lupus erythematosus (H)        Past Surgical History:  Past Surgical History:   Procedure Laterality Date     COLONOSCOPY N/A 05/23/2022    Procedure: COLONOSCOPY;  Surgeon: Parth Meneses MD;  Location: [facility]     CV CORONARY ANGIOGRAM N/A 05/24/2022    Procedure: Coronary Angiogram;  Surgeon: Mike Pope MD;  [facility]     CV CORONARY ANGIOGRAM N/A 05/29/2022    Procedure: Coronary Angiogram;  Surgeon: Austin Bright MD;  [facility]     CV CORONARY LITHOTRIPSY PCI Bilateral 05/24/2022    Procedure: Percutaneous Coronary Intervention - Lithotripsy;  Surgeon: Mike Pope MD;  [facility]     CV INTRA AORTIC BALLOON N/A 06/17/2022    Procedure: Intraprocedure Aortic Balloon Pump Insertion;  Surgeon: Sherman Cerda MD;  Location:  [facility] CARDIAC CATH LAB     CV INTRA AORTIC BALLOON Right 07/03/2022    Procedure: Reposition of Subclavian Intraprocedure Aortic Balloon Pump;  Surgeon: Austin Bright MD;  Location:  [facility] CARD[facility]AC CATH LAB     CV INTRAVASULAR ULTRASOUND N/A 05/24/2022    Procedure: Intravascular Ultrasound;  Surgeon: Mike Pope MD;  Location:  [facility] CARD[facility] CATH LAB     CV PCI ANGIOPLASTY N/A 05/29/2022    Procedure: Percutaneous Transluminal Angioplasty;  Surgeon: Austin Bright MD;  Location:  [facility] CARDIAC CATH LAB     CV PCI STENT DRUG ELUTING N/A 05/24/2022    Procedure: Percutaneous Coronary  Intervention Stent;  Surgeon: Mike Pope MD;  Location:  HEART CARDIAC CATH LAB     CV RIGHT HEART CATH MEASUREMENTS RECORDED N/A 05/18/2022    Procedure: Right Heart Catheterization;  Surgeon: Justo Stewart MD;  Location:  HEART CARDIAC CATH LAB     CV RIGHT HEART CATH MEASUREMENTS RECORDED N/A 05/24/2022    Procedure: Right Heart Catheterization;  Surgeon: Mike Pope MD;  Location:  HEART CARDIAC CATH LAB     CV RIGHT HEART CATH MEASUREMENTS RECORDED N/A 05/29/2022    Procedure: Right Heart Catheterization;  Surgeon: Austin Bright MD;  Location:  [facility]ART CARDIAC CATH LAB     CV RIGHT HEART CATH MEASUREMENTS RECORDED N/A 07/01/2022    Procedure: Right Heart Catheterization;  Surgeon: Mike Pope MD;  Location:  [facility]ART CARDIAC CATH LAB     CV RIGHT HEART CATH MEASUREMENTS RECORDED N/A 09/23/2022    Procedure: Right Heart Catheterization;  Surgeon: Justo Stewart MD;  Location:  HEART CARDIAC CATH LAB     CV RIGHT HEART EXERCISE STRESS STUDY N/A 05/18/2022    Procedure: Stress Drug Study;  Surgeon: Justo Stewart MD;  Location: [facility] CARDIAC CATH LAB     CV SWAN CHOCO PROCEDURE N/A 06/17/2022    Procedure: [place] Choco Procedure;  Surgeon: Sherman Cerda MD;  Location:  HEART CARDIAC CATH LAB     EP PACEMAKER OR ICD LEAD IMPLANT-ONE LEAD N/A 1/13/2023    Procedure: CRT-D Lead revision;  Surgeon: Beckie Maurice MD;  Location:  HEART CARDIAC CATH LAB     INCISION AND DRAINAGE CHEST WASHOUT, COMBINED N/A 07/11/2022    Procedure: Chest washout and closure;  Surgeon: Darnell Morgan MD;  Location:  OR     INCISION AND DRAINAGE CHEST WASHOUT, COMBINED N/A 07/12/2022    Procedure: INCISION AND DRAINAGE, WOUND, CHEST, WITH IRRIGATION;  Surgeon: Darius Zamora MD;  Location:  OR     INJECT NERVE OTHER PERIPHERAL Left 1/16/2023    Procedure: Cryoablation of intercostal nerves;  Surgeon: Kenroy Nguyen MD;  Location:  GI     INSERT ARTERIAL LINE  07/18/2022           INSERT INTRAAORTIC BALLOON PUMP Right 06/23/2022    Procedure: INSERTION, INTRA-AORTIC BALLOON PUMP RIGHT SUBCLAVIAN ARTERY.;  Surgeon: Hayden Veras MD;  Location: [facility] OR     INSERT VENTRICULAR ASSIST DEVICE LEFT (HEARTMATE II/III) MINIMALLY INVASIVE N/A 07/08/2022    Procedure: MEDIAN STERNOTOMY.  CARDIOPULMONARY BYPASS.  INTRAOPERATIVE TRANSESOPHAGEAL ECHOCARDIOGRAM.  IMPLANT LEFT VENTRICULAR ASSIST DEVICE HEARTMATE III.  PLACEMENT OF PERCUTANEOUS RIGHT VENTRICULAR ASSIST DEVICE.  TEMPORARY CHEST CLOSURE;  Surgeon: Darius Zamora MD;  Location: [facility] OR     IR CHEST TUBE PLACEMENT NON-TUNNELED RIGHT  08/01/2022     IRRIGATION AND DEBRIDEMENT CHEST WASHOUT, COMBINED N/A 07/13/2022    Procedure: IRRIGATION AND DEBRIDEMENT, CHEST;  Surgeon: Darius Zamora MD;  Location: [facility] OR     MIDLINE DOUBLE LUMEN PLACEMENT Left 09/11/2022    Mid line ok to use     MIDLINE DOUBLE LUMEN PLACEMENT Right 09/14/2022    5FR DL midline.     PICC DOUBLE LUMEN PLACEMENT Right 05/28/2022    right basilic 5 fr dl picc 40 cm     PICC DOUBLE LUMEN PLACEMENT Right 08/15/2022    Right Lateral, 41 total length, 3 cm external length     PICC SINGLE LUMEN PLACEMENT Right 11/01/2022    41cm (2cm external), Lateral brachial vein, low SVC       Social History:  Social History     Socioeconomic History     Marital status: Single     Spouse name: Not on file     Number of children: Not on file     Years of education: Not on file     Highest education level: Not on file   Occupational History     Not on file   Tobacco Use     Smoking status: Former     Smokeless tobacco: Never   Substance and Sexual Activity     Alcohol use: Not on file     Drug use: Not on file     Sexual activity: Not on file   Other Topics Concern     Not on file   Social History Narrative     Not on file     Social Determinants of Health     Financial Resource Strain: Not on file   Food Insecurity: Not on file   Transportation Needs: Not on file   Physical  Activity: Not on file   Stress: Not on file   Social Connections: Not on file   Intimate Partner Violence: Not on file   Housing Stability: Not on file       Family Medical History:  No family history on file.    Allergies:   No Known Allergies    Medications:  Current Outpatient Medications   Medication Sig Dispense Refill     acetaminophen (TYLENOL) 325 MG tablet Take 3 tablets (975 mg) by mouth every 8 hours as needed for mild pain 270 tablet 0     amoxicillin (AMOXIL) 500 MG capsule Take 4 capsules (2,000 mg) by mouth as needed (take 1 hour prior to any dental procedures) 4 capsule 3     aspirin (ASA) 81 MG EC tablet Take 1 tablet (81 mg) by mouth daily       atorvastatin (LIPITOR) 40 MG tablet Take 1 tablet (40 mg) by mouth daily 30 tablet 0     cephalexin 500 MG tablet Take 500 mg by mouth 3 times daily 15 tablet 0     diazepam (VALIUM) 2 MG tablet Take 1 tablet (2 mg) by mouth every 6 hours as needed for anxiety 30 tablet 0     digoxin (LANOXIN) 125 MCG tablet Take 1 tablet (125 mcg) by mouth daily 90 tablet 3     famotidine (PEPCID) 20 MG tablet Take 1 tablet (20 mg) by mouth 2 times daily 60 tablet 11     furosemide (LASIX) 20 MG tablet Take 1 tablet (20 mg) by mouth daily as needed (For weight gain or shortness of breath) 30 tablet 0     hydrocortisone 1 % CREA cream Place 1 g rectally daily as needed for itching 1 g 0     hydroxychloroquine (PLAQUENIL) 200 MG tablet Take 1 tablet (200 mg) by mouth 2 times daily 60 tablet 4     lidocaine (LIDODERM) 5 % patch Place 1 patch onto the skin every 24 hours To prevent lidocaine toxicity, patient should be patch free for 12 hrs daily. 3 patch 3     lisinopril (ZESTRIL) 10 MG tablet Take 1 tablet (10 mg) by mouth 2 times daily 180 tablet 3     loperamide (IMODIUM) 2 MG capsule Take 1 capsule (2 mg) by mouth 2 times daily as needed for diarrhea 30 capsule 0     melatonin 5 MG tablet Take 1 tablet (5 mg) by mouth At Bedtime 30 tablet 0     methylcellulose (CITRUCEL)  500 MG TABS tablet Take 2 tablets (1,000 mg) by mouth 2 times daily 60 tablet 0     multivitamin w/minerals (THERA-VIT-M) tablet Take 1 tablet by mouth daily  0     pramox-pe-glycerin-petrolatum (PREPARATION H) 1-0.25-14.4-15 % CREA cream Place rectally 2 times daily as needed for hemorrhoids Apply immediately after a bowel movement. 51 g 0     tamsulosin (FLOMAX) 0.4 MG capsule Take 1 capsule (0.4 mg) by mouth daily 30 capsule 0     warfarin ANTICOAGULANT (COUMADIN) 2.5 MG tablet Take 5mg on Sunday, Tuesday, Thursday and 7.5mg all other days, or as directed by anticoagulation clinic 192 tablet 3     minocycline (MINOCIN) 100 MG capsule Take 1 capsule by mouth 2 times daily (before meals) (Patient not taking: Reported on 3/16/2023)       saline nasal (AYR SALINE) GEL topical gel Apply 1 applicator into each nare nightly as needed for congestion (Patient not taking: Reported on 3/16/2023)         Immunizations:  Immunization History   Administered Date(s) Administered     COVID-19 Vaccine 18+ (Moderna) 03/13/2021, 04/10/2021, 08/24/2021     COVID-19 Vaccine Bivalent Booster 12+ (Pfizer) 01/20/2023     Influenza Vaccine >6 months (Alfuria,Fluzone) 12/17/2021     Pneumo Conj 13-V (2010&after) 04/09/2018     Tdap (Adult) Unspecified Formulation 03/14/2016     Zoster vaccine recombinant adjuvanted (SHINGRIX) 08/16/2019, 10/18/2019       Exam:  Pulse 59   Temp 97.6  F (36.4  C) (Oral)   Wt 70.3 kg (155 lb)   SpO2 98%   BMI 24.28 kg/m    Gen: Alert and in no distress.   Psych: Normal affect. Alert and oriented.   HEENT: EOMI. No icterus. Moist mucous membranes   Neck: No lymphadenopathy.   CV: Regular rate    Chest: Non-labored breathing, symmetric chest rise.   Abdomen: Soft, non-distended. Erythematous nodule near epigastrium  Extremities: Warm and well perfused.   Skin: No rashes or lesions noted.     Labs:  WBC Count   Date Value Ref Range Status   01/13/2023 7.1 4.0 - 11.0 10e3/uL Final       CRP Inflammation    Date Value Ref Range Status   06/19/2022 28.0 (H) 0.0 - 8.0 mg/L Final       Creatinine   Date Value Ref Range Status   01/13/2023 0.85 0.67 - 1.17 mg/dL Final   01/12/2023 0.80 0.67 - 1.17 mg/dL Final   12/12/2022 0.80 0.67 - 1.17 mg/dL Final       Assessment and Plan:  Dandy Sands is a 62 year old male with a history of SLE, antiphospholipid syndrome, CAD, CHF 2/2 ICM s/p LVAD 7/8/22, history of C.diff who presents today for management of chronic driveline infection.    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  He is currently on minocycline for suppression.  He has been changing his dressing daily due to concerns of drainage with his daughter and son helping. He does present with a peculiar erythematous nodule in his epigastrium region over a rib.  It is unclear the relevance of this as he does report trauma associated with this; however, it is over a site that would make me concerned about possible infection.  Will have him review this with his surgeon. Given his recurrent infections, will plan on placing him on suppressive therapy.  Will obtain new cultures today and keep him on minocycline for now.      Plan  -Surgical evaluation of erythematous nodule in epigastrium/rib region  -Obtained cultures of driveline today  -Continue PO minocycline for suppressive therapy  -Ok to change dressings every other day        Case discussed with Dr. Zoltan Triplett DO, MPH  Infectious Disease Fellow  Pager: [phone]  Vocera preferred

## 2023-03-16 NOTE — NURSING NOTE
Chief Complaint   Patient presents with     Consult     LVAD     Pulse 59, temperature 97.6  F (36.4  C), temperature source Oral, weight 70.3 kg (155 lb), SpO2 98 %.    Naveen Mendez on 3/16/2023 at 8:57 AM

## 2023-03-17 ENCOUNTER — OFFICE VISIT (OUTPATIENT)
Dept: CARDIOLOGY | Facility: CLINIC | Age: 63
End: 2023-03-17
Attending: INTERNAL MEDICINE
Payer: COMMERCIAL

## 2023-03-17 ENCOUNTER — ANESTHESIA EVENT (OUTPATIENT)
Dept: SURGERY | Facility: CLINIC | Age: 63
End: 2023-03-17

## 2023-03-17 ENCOUNTER — HOSPITAL ENCOUNTER (INPATIENT)
Facility: CLINIC | Age: 63
LOS: 9 days | Discharge: HOME | DRG: 315 | End: 2023-03-26
Attending: THORACIC SURGERY (CARDIOTHORACIC VASCULAR SURGERY) | Admitting: THORACIC SURGERY (CARDIOTHORACIC VASCULAR SURGERY)
Payer: COMMERCIAL

## 2023-03-17 ENCOUNTER — ANCILLARY PROCEDURE (OUTPATIENT)
Dept: CARDIOLOGY | Facility: CLINIC | Age: 63
DRG: 315 | End: 2023-03-17
Attending: THORACIC SURGERY (CARDIOTHORACIC VASCULAR SURGERY)
Payer: COMMERCIAL

## 2023-03-17 ENCOUNTER — ANESTHESIA (OUTPATIENT)
Dept: SURGERY | Facility: CLINIC | Age: 63
End: 2023-03-17

## 2023-03-17 VITALS — SYSTOLIC BLOOD PRESSURE: 70 MMHG | HEART RATE: 102 BPM | BODY MASS INDEX: 23.98 KG/M2 | WEIGHT: 153.1 LBS

## 2023-03-17 DIAGNOSIS — T82.7XXA INFECTION ASSOCIATED WITH DRIVELINE OF LEFT VENTRICULAR ASSIST DEVICE (LVAD) (H): Primary | ICD-10-CM

## 2023-03-17 DIAGNOSIS — Z95.811 LVAD (LEFT VENTRICULAR ASSIST DEVICE) PRESENT (H): ICD-10-CM

## 2023-03-17 DIAGNOSIS — I50.22 CHRONIC SYSTOLIC CONGESTIVE HEART FAILURE (H): Primary | ICD-10-CM

## 2023-03-17 LAB
ABO/RH(D): NORMAL
ALBUMIN SERPL BCG-MCNC: 3.2 G/DL (ref 3.5–5.2)
ALP SERPL-CCNC: 192 U/L (ref 40–129)
ALT SERPL W P-5'-P-CCNC: 15 U/L (ref 10–50)
ANION GAP SERPL CALCULATED.3IONS-SCNC: 12 MMOL/L (ref 7–15)
ANTIBODY SCREEN: NEGATIVE
AST SERPL W P-5'-P-CCNC: 22 U/L (ref 10–50)
BILIRUB SERPL-MCNC: 0.5 MG/DL
BUN SERPL-MCNC: 23.2 MG/DL (ref 8–23)
CALCIUM SERPL-MCNC: 10.3 MG/DL (ref 8.8–10.2)
CHLORIDE SERPL-SCNC: 101 MMOL/L (ref 98–107)
CREAT SERPL-MCNC: 0.78 MG/DL (ref 0.67–1.17)
DEPRECATED HCO3 PLAS-SCNC: 19 MMOL/L (ref 22–29)
ERYTHROCYTE [DISTWIDTH] IN BLOOD BY AUTOMATED COUNT: 19.3 % (ref 10–15)
GFR SERPL CREATININE-BSD FRML MDRD: >90 ML/MIN/1.73M2
GLUCOSE BLDC GLUCOMTR-MCNC: 82 MG/DL (ref 70–99)
GLUCOSE SERPL-MCNC: 84 MG/DL (ref 70–99)
HCT VFR BLD AUTO: 25.9 % (ref 40–53)
HGB BLD-MCNC: 7.8 G/DL (ref 13.3–17.7)
INR PPP: 2.66 (ref 0.85–1.15)
LDH SERPL L TO P-CCNC: 169 U/L (ref 0–250)
MCH RBC QN AUTO: 25.7 PG (ref 26.5–33)
MCHC RBC AUTO-ENTMCNC: 30.1 G/DL (ref 31.5–36.5)
MCV RBC AUTO: 85 FL (ref 78–100)
PLATELET # BLD AUTO: 277 10E3/UL (ref 150–450)
POTASSIUM SERPL-SCNC: 4.3 MMOL/L (ref 3.4–5.3)
PROT SERPL-MCNC: 7.5 G/DL (ref 6.4–8.3)
RBC # BLD AUTO: 3.04 10E6/UL (ref 4.4–5.9)
SARS-COV-2 RNA RESP QL NAA+PROBE: NEGATIVE
SODIUM SERPL-SCNC: 132 MMOL/L (ref 136–145)
SPECIMEN EXPIRATION DATE: NORMAL
WBC # BLD AUTO: 7.9 10E3/UL (ref 4–11)

## 2023-03-17 PROCEDURE — 999N000141 HC STATISTIC PRE-PROCEDURE NURSING ASSESSMENT: Performed by: THORACIC SURGERY (CARDIOTHORACIC VASCULAR SURGERY)

## 2023-03-17 PROCEDURE — 85610 PROTHROMBIN TIME: CPT | Performed by: PHYSICIAN ASSISTANT

## 2023-03-17 PROCEDURE — 36415 COLL VENOUS BLD VENIPUNCTURE: CPT | Performed by: PHYSICIAN ASSISTANT

## 2023-03-17 PROCEDURE — U0005 INFEC AGEN DETEC AMPLI PROBE: HCPCS | Performed by: THORACIC SURGERY (CARDIOTHORACIC VASCULAR SURGERY)

## 2023-03-17 PROCEDURE — 83615 LACTATE (LD) (LDH) ENZYME: CPT | Performed by: PHYSICIAN ASSISTANT

## 2023-03-17 PROCEDURE — 250N000013 HC RX MED GY IP 250 OP 250 PS 637

## 2023-03-17 PROCEDURE — 214N000001 HC R&B CCU UMMC

## 2023-03-17 PROCEDURE — 93287 PERI-PX DEVICE EVAL & PRGR: CPT | Mod: 26 | Performed by: INTERNAL MEDICINE

## 2023-03-17 PROCEDURE — G0463 HOSPITAL OUTPT CLINIC VISIT: HCPCS | Performed by: THORACIC SURGERY (CARDIOTHORACIC VASCULAR SURGERY)

## 2023-03-17 PROCEDURE — 80053 COMPREHEN METABOLIC PANEL: CPT | Performed by: PHYSICIAN ASSISTANT

## 2023-03-17 PROCEDURE — 250N000013 HC RX MED GY IP 250 OP 250 PS 637: Performed by: PHYSICIAN ASSISTANT

## 2023-03-17 PROCEDURE — 86901 BLOOD TYPING SEROLOGIC RH(D): CPT | Performed by: PHYSICIAN ASSISTANT

## 2023-03-17 PROCEDURE — 93287 PERI-PX DEVICE EVAL & PRGR: CPT

## 2023-03-17 PROCEDURE — 85027 COMPLETE CBC AUTOMATED: CPT | Performed by: PHYSICIAN ASSISTANT

## 2023-03-17 RX ORDER — ACETAMINOPHEN 325 MG/1
975 TABLET ORAL EVERY 8 HOURS PRN
Status: DISCONTINUED | OUTPATIENT
Start: 2023-03-17 | End: 2023-03-22

## 2023-03-17 RX ORDER — DIGOXIN 125 MCG
125 TABLET ORAL DAILY
Status: DISCONTINUED | OUTPATIENT
Start: 2023-03-18 | End: 2023-03-26 | Stop reason: HOSPADM

## 2023-03-17 RX ORDER — PHENYLEPHRINE HCL IN 0.9% NACL 50MG/250ML
.1-6 PLASTIC BAG, INJECTION (ML) INTRAVENOUS CONTINUOUS
Status: DISCONTINUED | OUTPATIENT
Start: 2023-03-17 | End: 2023-03-17 | Stop reason: HOSPADM

## 2023-03-17 RX ORDER — GABAPENTIN 100 MG/1
100 CAPSULE ORAL 3 TIMES DAILY
Status: DISCONTINUED | OUTPATIENT
Start: 2023-03-17 | End: 2023-03-26 | Stop reason: HOSPADM

## 2023-03-17 RX ORDER — FAMOTIDINE 20 MG/1
20 TABLET, FILM COATED ORAL 2 TIMES DAILY
Status: DISCONTINUED | OUTPATIENT
Start: 2023-03-17 | End: 2023-03-26 | Stop reason: HOSPADM

## 2023-03-17 RX ORDER — HYDROXYCHLOROQUINE SULFATE 200 MG/1
200 TABLET, FILM COATED ORAL 2 TIMES DAILY
Status: DISCONTINUED | OUTPATIENT
Start: 2023-03-17 | End: 2023-03-26 | Stop reason: HOSPADM

## 2023-03-17 RX ORDER — LEVOFLOXACIN 5 MG/ML
500 INJECTION, SOLUTION INTRAVENOUS
Status: DISCONTINUED | OUTPATIENT
Start: 2023-03-17 | End: 2023-03-17 | Stop reason: HOSPADM

## 2023-03-17 RX ORDER — FERROUS SULFATE 325(65) MG
325 TABLET ORAL
COMMUNITY
End: 2023-08-25 | Stop reason: DRUGHIGH

## 2023-03-17 RX ORDER — LISINOPRIL 10 MG/1
10 TABLET ORAL 2 TIMES DAILY
Status: DISCONTINUED | OUTPATIENT
Start: 2023-03-17 | End: 2023-03-26 | Stop reason: HOSPADM

## 2023-03-17 RX ORDER — SODIUM CHLORIDE, SODIUM LACTATE, POTASSIUM CHLORIDE, CALCIUM CHLORIDE 600; 310; 30; 20 MG/100ML; MG/100ML; MG/100ML; MG/100ML
INJECTION, SOLUTION INTRAVENOUS CONTINUOUS
Status: DISCONTINUED | OUTPATIENT
Start: 2023-03-17 | End: 2023-03-17 | Stop reason: HOSPADM

## 2023-03-17 RX ORDER — VANCOMYCIN HYDROCHLORIDE 1 G/200ML
1000 INJECTION, SOLUTION INTRAVENOUS
Status: DISCONTINUED | OUTPATIENT
Start: 2023-03-17 | End: 2023-03-17 | Stop reason: HOSPADM

## 2023-03-17 RX ORDER — MUPIROCIN 20 MG/G
OINTMENT TOPICAL 2 TIMES DAILY
Status: DISCONTINUED | OUTPATIENT
Start: 2023-03-17 | End: 2023-03-17 | Stop reason: HOSPADM

## 2023-03-17 RX ORDER — TAMSULOSIN HYDROCHLORIDE 0.4 MG/1
0.4 CAPSULE ORAL ONCE
Status: COMPLETED | OUTPATIENT
Start: 2023-03-17 | End: 2023-03-17

## 2023-03-17 RX ORDER — ATORVASTATIN CALCIUM 40 MG/1
40 TABLET, FILM COATED ORAL DAILY
Status: DISCONTINUED | OUTPATIENT
Start: 2023-03-18 | End: 2023-03-26 | Stop reason: HOSPADM

## 2023-03-17 RX ORDER — DEXMEDETOMIDINE HYDROCHLORIDE 4 UG/ML
.1-1.2 INJECTION, SOLUTION INTRAVENOUS CONTINUOUS
Status: DISCONTINUED | OUTPATIENT
Start: 2023-03-17 | End: 2023-03-17 | Stop reason: HOSPADM

## 2023-03-17 RX ORDER — SODIUM CHLORIDE/ALOE VERA
1 GEL (GRAM) NASAL
Status: DISCONTINUED | OUTPATIENT
Start: 2023-03-17 | End: 2023-03-26 | Stop reason: HOSPADM

## 2023-03-17 RX ORDER — ACETAMINOPHEN 325 MG/1
975 TABLET ORAL ONCE
Status: COMPLETED | OUTPATIENT
Start: 2023-03-17 | End: 2023-03-17

## 2023-03-17 RX ORDER — HYDRALAZINE HYDROCHLORIDE 25 MG/1
25 TABLET, FILM COATED ORAL 3 TIMES DAILY
COMMUNITY
End: 2023-08-25 | Stop reason: DRUGHIGH

## 2023-03-17 RX ORDER — LIDOCAINE 40 MG/G
CREAM TOPICAL
Status: DISCONTINUED | OUTPATIENT
Start: 2023-03-17 | End: 2023-03-17 | Stop reason: HOSPADM

## 2023-03-17 RX ORDER — NOREPINEPHRINE BITARTRATE 0.06 MG/ML
.01-.1 INJECTION, SOLUTION INTRAVENOUS CONTINUOUS
Status: DISCONTINUED | OUTPATIENT
Start: 2023-03-17 | End: 2023-03-17 | Stop reason: HOSPADM

## 2023-03-17 RX ORDER — MINOCYCLINE HYDROCHLORIDE 100 MG/1
100 CAPSULE ORAL
Status: DISCONTINUED | OUTPATIENT
Start: 2023-03-18 | End: 2023-03-18

## 2023-03-17 RX ORDER — DIAZEPAM 2 MG
2 TABLET ORAL EVERY 6 HOURS PRN
Status: DISCONTINUED | OUTPATIENT
Start: 2023-03-17 | End: 2023-03-26 | Stop reason: HOSPADM

## 2023-03-17 RX ORDER — FLUCONAZOLE 2 MG/ML
200 INJECTION, SOLUTION INTRAVENOUS
Status: DISCONTINUED | OUTPATIENT
Start: 2023-03-17 | End: 2023-03-17 | Stop reason: HOSPADM

## 2023-03-17 RX ORDER — ONDANSETRON 2 MG/ML
4 INJECTION INTRAMUSCULAR; INTRAVENOUS EVERY 6 HOURS PRN
Status: DISCONTINUED | OUTPATIENT
Start: 2023-03-17 | End: 2023-03-26 | Stop reason: HOSPADM

## 2023-03-17 RX ORDER — MULTIPLE VITAMINS W/ MINERALS TAB 9MG-400MCG
1 TAB ORAL DAILY
Status: DISCONTINUED | OUTPATIENT
Start: 2023-03-18 | End: 2023-03-26 | Stop reason: HOSPADM

## 2023-03-17 RX ORDER — LIDOCAINE 40 MG/G
CREAM TOPICAL
Status: DISCONTINUED | OUTPATIENT
Start: 2023-03-17 | End: 2023-03-26 | Stop reason: HOSPADM

## 2023-03-17 RX ORDER — ASPIRIN 81 MG/1
81 TABLET ORAL DAILY
Status: DISCONTINUED | OUTPATIENT
Start: 2023-03-18 | End: 2023-03-26 | Stop reason: HOSPADM

## 2023-03-17 RX ORDER — GABAPENTIN 100 MG/1
CAPSULE ORAL
COMMUNITY
Start: 2023-03-02

## 2023-03-17 RX ORDER — LIDOCAINE 4 G/G
1 PATCH TOPICAL
Status: DISCONTINUED | OUTPATIENT
Start: 2023-03-17 | End: 2023-03-24

## 2023-03-17 RX ORDER — WARFARIN SODIUM 5 MG/1
5 TABLET ORAL ONCE
Status: COMPLETED | OUTPATIENT
Start: 2023-03-17 | End: 2023-03-17

## 2023-03-17 RX ORDER — MAGNESIUM HYDROXIDE/ALUMINUM HYDROXICE/SIMETHICONE 120; 1200; 1200 MG/30ML; MG/30ML; MG/30ML
30 SUSPENSION ORAL EVERY 4 HOURS PRN
Status: DISCONTINUED | OUTPATIENT
Start: 2023-03-17 | End: 2023-03-26 | Stop reason: HOSPADM

## 2023-03-17 RX ORDER — DIGOXIN 125 MCG
125 TABLET ORAL ONCE
Status: COMPLETED | OUTPATIENT
Start: 2023-03-17 | End: 2023-03-17

## 2023-03-17 RX ORDER — LEVOFLOXACIN 5 MG/ML
500 INJECTION, SOLUTION INTRAVENOUS SEE ADMIN INSTRUCTIONS
Status: DISCONTINUED | OUTPATIENT
Start: 2023-03-17 | End: 2023-03-17 | Stop reason: HOSPADM

## 2023-03-17 RX ORDER — ONDANSETRON 4 MG/1
4 TABLET, ORALLY DISINTEGRATING ORAL EVERY 6 HOURS PRN
Status: DISCONTINUED | OUTPATIENT
Start: 2023-03-17 | End: 2023-03-26 | Stop reason: HOSPADM

## 2023-03-17 RX ORDER — TAMSULOSIN HYDROCHLORIDE 0.4 MG/1
0.4 CAPSULE ORAL DAILY
Status: DISCONTINUED | OUTPATIENT
Start: 2023-03-18 | End: 2023-03-26 | Stop reason: HOSPADM

## 2023-03-17 RX ORDER — CEFDINIR 300 MG/1
CAPSULE ORAL
Status: ON HOLD | COMMUNITY
Start: 2023-02-28 | End: 2023-03-26

## 2023-03-17 RX ADMIN — ACETAMINOPHEN 325MG 975 MG: 325 TABLET ORAL at 13:49

## 2023-03-17 RX ADMIN — GABAPENTIN 100 MG: 100 CAPSULE ORAL at 21:46

## 2023-03-17 RX ADMIN — TAMSULOSIN HYDROCHLORIDE 0.4 MG: 0.4 CAPSULE ORAL at 16:13

## 2023-03-17 RX ADMIN — HYDROXYCHLOROQUINE SULFATE 200 MG: 200 TABLET, FILM COATED ORAL at 21:01

## 2023-03-17 RX ADMIN — DIGOXIN 125 MCG: 125 TABLET ORAL at 16:13

## 2023-03-17 RX ADMIN — FAMOTIDINE 20 MG: 20 TABLET ORAL at 21:02

## 2023-03-17 RX ADMIN — ACETAMINOPHEN 975 MG: 325 TABLET ORAL at 21:01

## 2023-03-17 RX ADMIN — DIAZEPAM 2 MG: 2 TABLET ORAL at 21:02

## 2023-03-17 RX ADMIN — WARFARIN SODIUM 5 MG: 5 TABLET ORAL at 21:46

## 2023-03-17 ASSESSMENT — ACTIVITIES OF DAILY LIVING (ADL)
ADLS_ACUITY_SCORE: 41
DIFFICULTY_EATING/SWALLOWING: NO
ADLS_ACUITY_SCORE: 37
WALKING_OR_CLIMBING_STAIRS_DIFFICULTY: NO
ADLS_ACUITY_SCORE: 26
DRESSING/BATHING_DIFFICULTY: NO
CHANGE_IN_FUNCTIONAL_STATUS_SINCE_ONSET_OF_CURRENT_ILLNESS/INJURY: NO
FALL_HISTORY_WITHIN_LAST_SIX_MONTHS: YES
NUMBER_OF_TIMES_PATIENT_HAS_FALLEN_WITHIN_LAST_SIX_MONTHS: 2
ADLS_ACUITY_SCORE: 39
TOILETING_ISSUES: NO
ADLS_ACUITY_SCORE: 39
CONCENTRATING,_REMEMBERING_OR_MAKING_DECISIONS_DIFFICULTY: NO
ADLS_ACUITY_SCORE: 41
DOING_ERRANDS_INDEPENDENTLY_DIFFICULTY: NO
WEAR_GLASSES_OR_BLIND: YES

## 2023-03-17 ASSESSMENT — PAIN SCALES - GENERAL: PAINLEVEL: MILD PAIN (2)

## 2023-03-17 NOTE — NURSING NOTE
Chief Complaint   Patient presents with     Follow Up     Return CV Surgery     Vitals were taken, medications reconciled, and MAP was recorded.    KHARI Mckeon  8:10 AM

## 2023-03-17 NOTE — H&P
Cardiovascular Surgery H&P  03/17/2023         Assessment and Plan:     Dandy Sands is a 62 year old male with a history of SLE, antiphospholipid syndrome, CAD, CHF 2/2 ICM s/p LVAD 7/8/22, history of C.diff who presents today for management of chronic driveline infection.  Patient has had a complex history since his initial LVAD placement in 7/2022.  He was hospitalized 6/17/22 - 8/21/22 for decompensated heart failure complicated by cardiogenic shock leading to LVAD placement. 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 He was treated with Vancomycin inpatient and ultimately transitioned to Daptomycin for  2-4 weeks.  He was subsequently placed on minocycline for suppression.  He developed increased drainage in 12/2022 and received another course of IV Daptomycin for 2 weeks.  He was hospitalized 1/21 - 1/24 for dizziness and transitioned back to PO minocycline by infectious disease.       He continues to have drainage from driveline site. Dr Zamora is admitting Dandy for a add on chest I&D this afternoon. Dandy last at food at 8am. A covid swab negative. Patient states that he was moving boxes yesterday and dropped a box and fell right on his chest. He has a small hematoma appearing spot on the inferior border of his sternal incision that is tender to touch.    Keep NPO until after procedure. Then can resume regular diet.  Not reversing INR for procedure. Plan to continue coumadin this evening after I&D.  Home meds reordered. Patient took evening meds on accident in place of AM meds this morning. Digoxin and flomax to be given this afternoon.    Ivy Zabala PA-C   Cardiothoracic Surgery   Pager #[phone]  March 17, 2023 at 4:40 PM             Physical Exam:     Gen: A&Ox4, NAD  Neuro: no focal deficits   CV:RRR, normal S1 S2  Pulm: CTA,no wheezing or rhonchi, normal breathing on RA  Abd: nondistended, normal BS, soft, nontender  Ext: no  peripheral edema  Incision: clean, dry, intact, sternum stable. Moderate bump on the inferior border of his incision, tender to touch.  Lines: driveline dressing intact, did not remove for exam.          Data:    Imaging:  reviewed recent imaging, no acute concerns    Recent Results (from the past 24 hour(s))   Cardiac Device Check - Inpatient   Result Value    Date Time Interrogation Session [id number]    Implantable Pulse Generator  Medtronic    Implantable Pulse Generator Model [id number] Eliseo TIRADO DR    Implantable Pulse Generator Serial Number [id number]    Type Interrogation Session In Clinic    Clinic Name [facility]    Implantable Pulse Generator Type Defibrillator    Implantable Pulse Generator Implant Date 20180412    Implantable Lead  Medtronic    Implantable Lead Model 5076 CapSureFix Novus MRI SureScan    Implantable Lead Serial Number [id number]    Implantable Lead Implant Date 20230113    Implantable Lead Polarity Type Bipolar Lead    Implantable Lead Location Detail 1 APEX    Implantable Lead Special Function 52 cm    Implantable Lead Location Right Ventricle    Implantable Lead  Medtronic    Implantable Lead Model 5076 CapSureFix Novus    Implantable Lead Serial Number [id number]    Implantable Lead Implant Date 20060306    Implantable Lead Polarity Type Bipolar Lead    Implantable Lead Location Detail 1 APPENDAGE    Implantable Lead Special Function 45 cm    Implantable Lead Location Right Atrium    Implantable Lead  Medtronic    Implantable Lead Model 6949 Sprint Doron    Implantable Lead Serial Number [id number]    Implantable Lead Implant Date 20060306    Implantable Lead Polarity Type Bipolar Lead    Implantable Lead Location Detail 1 APEX    Implantable Lead Special Function      In Use - Partially Reused (Paced/Sensed portion of the ICD lead is capped)58 cm    Implantable Lead Location Right Ventricle    Marcos Setting  Mode (NBG Code) MVP_AAI_DDD    Marcos Setting Lower Rate Limit 50    Marcos Setting Maximum Tracking Rate 130    Marcos Setting Maximum Sensor Rate 115    Marcos Setting Hysterisis Rate DISABLED    Marcos Setting TRISTEN Delay Low 350    Marcos Setting PAV Delay Low 350    Marcos Setting AT Mode Switch Rate 171    Lead Channel Setting Sensing Polarity Bipolar    Lead Channel Setting Sensing Anode Location Right Atrium    Lead Channel Setting Sensing Anode Terminal Ring    Lead Channel Setting Sensing Cathode Location Right Atrium    Lead Channel Setting Sensing Cathode Terminal Tip    Lead Channel Setting Sensing Sensitivity 0.15    Lead Channel Setting Sensing Polarity Bipolar    Lead Channel Setting Sensing Anode Location Right Ventricle    Lead Channel Setting Sensing Anode Terminal Ring    Lead Channel Setting Sensing Cathode Location Right Ventricle    Lead Channel Setting Sensing Cathode Terminal Tip    Lead Channel Setting Sensing Sensitivity 0.3    Lead Channel Setting Pacing Polarity Bipolar    Lead Channel Setting Pacing Anode Location Right Atrium    Lead Channel Setting Pacing Anode Terminal Ring    Lead Channel Setting Sensing Cathode Location Right Atrium    Lead Channel Setting Sensing Cathode Terminal Tip    Lead Channel Setting Pacing Pulse Width 0.4    Lead Channel Setting Pacing Amplitude 1.5    Lead Channel Setting Pacing Capture Mode Adaptive    Lead Channel Setting Pacing Polarity Bipolar    Lead Channel Setting Pacing Anode Location Right Ventricle    Lead Channel Setting Pacing Anode Terminal Ring    Lead Channel Setting Sensing Cathode Location Right Ventricle    Lead Channel Setting Sensing Cathode Terminal Tip    Lead Channel Setting Pacing Pulse Width 0.4    Lead Channel Setting Pacing Amplitude 2    Lead Channel Setting Pacing Capture Mode Adaptive    Zone Setting Type Category VF    Zone Setting Detection Interval 320    Zone Setting Detection Beats Numerator 30    Zone Setting Detection Beats  Denominator 40    Zone Setting Type Category VT    Zone Setting Detection Interval 280    Zone Setting Type Category VT    Zone Setting Detection Interval 350    Zone Setting Type Category VT    Zone Setting Detection Interval 400    Zone Setting Type Category ATRIAL_FIBRILLATION    Zone Setting Type Category AT/AF    Zone Setting Detection Interval 350    Lead Channel Impedance Value 342    Lead Channel Sensing Intrinsic Amplitude 1.25    Lead Channel Pacing Threshold Amplitude 0.5    Lead Channel Pacing Threshold Pulse Width 0.4    Lead Channel Impedance Value 456    Lead Channel Impedance Value 361    Lead Channel Sensing Intrinsic Amplitude 3.875    Lead Channel Pacing Threshold Amplitude 0.75    Lead Channel Pacing Threshold Pulse Width 0.4    Battery Date Time of Measurements 08051605265400    Battery Status Middle of Service    Battery RRT Trigger 2.727    Battery Remaining Longevity 44    Battery Voltage 2.96    Capacitor Charge Type Reformation    Capacitor Last Charge Date Time 58609193358110    Capacitor Charge Time 3.983    Capacitor Charge Energy 18    Marcos Statistic Date Time Start 24501415441689    Marcos Statistic Date Time End 36323961013061    Marcos Statistic RA Percent Paced 0.04    Marcos Statistic RV Percent Paced 0.14    Marcos Statistic AP  Percent 0.01    Marcos Statistic AS  Percent 0.13    Marcos Statistic AP VS Percent 0.03    Marcos Statistic AS VS Percent 99.83    Atrial Tachy Statistic Date Time Start 94389370664844    Atrial Tachy Statistic Date Time End 63139368456227    Atrial Tachy Statistic AT/AF [place] Percent 0    Therapy Statistic Recent Shocks Delivered 0    Therapy Statistic Recent Shocks Aborted 0    Therapy Statistic Recent ATP Delivered 0    Therapy Statistic Recent Date Time Start 31100529464379    Therapy Statistic Recent Date Time End 88654326551718    Therapy Statistic Total Shocks Delivered 1    Therapy Statistic Total Shocks Aborted 0    Therapy Statistic Total ATP  Delivered 0    Therapy Statistic Total  Date Time Start 20688443071697    Therapy Statistic Total  Date Time End 68880625413032    Episode Statistic Recent Count 0    Episode Statistic Type Category AT/AF    Episode Statistic Recent Count 0    Episode Statistic Type Category SVT    Episode Statistic Recent Count 2    Episode Statistic Type Category VT    Episode Statistic Recent Count 0    Episode Statistic Type Category VF    Episode Statistic Recent Count 0    Episode Statistic Type Category VT    Episode Statistic Recent Count 0    Episode Statistic Type Category VT    Episode Statistic Recent Count 0    Episode Statistic Type Category VT    Episode Statistic Recent Date Time Start 57438193030887    Episode Statistic Recent Date Time End 24010036088335    Episode Statistic Recent Date Time Start 85698953584331    Episode Statistic Recent Date Time End 44512905005314    Episode Statistic Recent Date Time Start 59116866421945    Episode Statistic Recent Date Time End 82740405208894    Episode Statistic Recent Date Time Start 31003369633780    Episode Statistic Recent Date Time End 15675337599831    Episode Statistic Recent Date Time Start 39711703778968    Episode Statistic Recent Date Time End 62245159752330    Episode Statistic Recent Date Time Start 93761066778800    Episode Statistic Recent Date Time End 59440551813919    Episode Statistic Recent Date Time Start 49505462704358    Episode Statistic Recent Date Time End 85107343179608    Episode Statistic Total Count 36    Episode Statistic Type Category AT/AF    Episode Statistic Total Count 0    Episode Statistic Type Category SVT    Episode Statistic Total Count 12    Episode Statistic Type Category VT    Episode Statistic Total Count 1    Episode Statistic Type Category VF    Episode Statistic Total Count 0    Episode Statistic Type Category VT    Episode Statistic Total Count 0    Episode Statistic Type Category VT    Episode Statistic Total Count 1     Episode Statistic Type Category VT    Episode Statistic Total Date Time Start 75487810910243    Episode Statistic Total Date Time End 24928466142942    Episode Statistic Total Date Time Start 46526566293350    Episode Statistic Total Date Time End 21433400824694    Episode Statistic Total Date Time Start 56481111995005    Episode Statistic Total Date Time End 66205362814872    Episode Statistic Total Date Time Start 10851701812472    Episode Statistic Total Date Time End 1961664978    Episode Statistic Total Date Time Start 20180412151624    Episode Statistic Total Date Time End 82170967605582    Episode Statistic Total Date Time Start 41757486710559    Episode Statistic Total Date Time End 43836039337990    Episode Statistic Total Date Time Start 22885009582373    Episode Statistic Total Date Time End 49836925458235    Episode Identifier 62    Episode Type Category VT    Episode Date Time 57347447947669    Episode Duration 1    Episode Identifier 61    Episode Type Category VT    Episode Date Time [id number]    Episode Duration 1    Narrative    Patient seen on U [address] [place] [address] for evaluation and iterative programming of their ICD per MD orders. Pre Op Driveline flush.    Device: Medtronic [id number] Evera XT   Normal device function.  Mode: AAI<>DDD /115  AP: <0.1%  : <0.2%  Intrinsic rhythm: AS-VS 80 bpm  Thoracic Impedance: Re-establishing a baseline post LVAD placement June 2022. Trending towards basline.  Short V-V intervals: 0  Lead Trends Appear Stable.  Estimated battery longevity to RRT = 3.6 years. Battery voltage = 2.97 V.   Atrial Arrhythmia: None  AF [place]: 0%  Anticoagulant: Warfarin  Ventricular Arrhythmia: 2 Non sustained VT episodes detected, 182-194 bpm lasting 1 sec each.  Setting Changes: None    Plan: Recommend magnet placement franklin op, this will disable ICD tachy therapies.  A call was placed to Dr Davidson from anesthesia, still waiting for callback to discuss plan.    Brenda Degroot,  RN    Dual lead ICD    I have reviewed and interpreted the device interrogation, settings, programming and nurse's summary. The device is functioning within normal device parameters. I agree with the current findings, assessment and plan.        Labs:  Recent Labs   Lab 03/17/23  1405 03/17/23  1355   WBC 7.9  --    HGB 7.8*  --    MCV 85  --      --    INR 2.66*  --    *  --    POTASSIUM 4.3  --    CHLORIDE 101  --    CO2 19*  --    BUN 23.2*  --    CR 0.78  --    ANIONGAP 12  --    ELDA 10.3*  --    GLC 84 82   ALBUMIN 3.2*  --    PROTTOTAL 7.5  --    BILITOTAL 0.5  --    ALKPHOS 192*  --    ALT 15  --    AST 22  --       GLUCOSE:   Recent Labs   Lab 03/17/23  1405 03/17/23  1355   GLC 84 82

## 2023-03-17 NOTE — LETTER
3/17/2023      RE: Dandy Sands  [address] [facility] [address]       Dear Colleague,    Thank you for the opportunity to participate in the care of your patient, Dandy Sands, at the [facility]. Please see a copy of my visit note below.    CVTS Followup Note  Patient well known to service.  He returns with complaints of exquisitely tender lump at base of sternum.  He did have a recent fall from standing where he hit this site.      BP (!) 70/0 (BP Location: Left arm, Patient Position: Chair, Cuff Size: Adult Regular)   Pulse 102   Wt 69.4 kg (153 lb 1.6 oz)   BMI 23.98 kg/m      In bed, conversant, comfortable  CTAB, RRR  5cm area of induration and erythema at xyphoid. Extremely tender and fluctuant.    A/P: 62yoM s/p LVAD with likely wound infection  - Plan for hospital admission with IV antibiotics and drainage in OR   - Please call with questions    Darius Zamora  Cardiothoracic surgery  [phone]

## 2023-03-17 NOTE — NURSING NOTE
Dr Zamora is admitting Dandy for a add on chest I&D this afternoon. Dandy last at food at 8am. A covid swab is being done now for admission to . Report called to . Once the Covid test results, the patient can be admitted to .

## 2023-03-17 NOTE — PROGRESS NOTES
3/17/2023   ID Chart Note    Please see ID Clinic note from 3/16 for history. Upon chart review, Mr. Sands is being admitted for I&D of presumed deep driveline infection. Initial ID recommendations below.     Recommendations:   1. Place inpatient ID consult order after surgery  2. Recommend blood cultures upon admission  3. Please obtain Gram stain, aerobic and anaerobic cultures, AFB stain and culture, and fungal culture during surgery  4. Ok to stop minocycline  5. Recommend empiric vancomycin alone pending cultures (suspect this is all C striatum) but will need broader coverage if GNRs are seen on Gram stain.   6. I would not use daptomycin for this patient. C striatum becomes rapidly resistant while on therapy plus his daughter reports a history of what sounds like elevated CK/rhabdo when this was used previously.   7. If C striatum grows, please ensure sensitivities are done on the isolate  8. Patient has been followed by Dr. Steve Saravia, ID provider with [place], so will need to decide if he will be followed here by LVAD ID group or by is previous ID provider after discharge.     Farida Charlton MD  Infectious Diseases  Pager [phone]

## 2023-03-18 ENCOUNTER — ANESTHESIA (OUTPATIENT)
Dept: SURGERY | Facility: CLINIC | Age: 63
DRG: 315 | End: 2023-03-18
Payer: COMMERCIAL

## 2023-03-18 ENCOUNTER — ANESTHESIA EVENT (OUTPATIENT)
Dept: SURGERY | Facility: CLINIC | Age: 63
DRG: 315 | End: 2023-03-18
Payer: COMMERCIAL

## 2023-03-18 LAB
ANION GAP SERPL CALCULATED.3IONS-SCNC: 11 MMOL/L (ref 7–15)
BUN SERPL-MCNC: 20.3 MG/DL (ref 8–23)
CALCIUM SERPL-MCNC: 9.6 MG/DL (ref 8.8–10.2)
CHLORIDE SERPL-SCNC: 104 MMOL/L (ref 98–107)
CREAT SERPL-MCNC: 0.76 MG/DL (ref 0.67–1.17)
DEPRECATED HCO3 PLAS-SCNC: 19 MMOL/L (ref 22–29)
ERYTHROCYTE [DISTWIDTH] IN BLOOD BY AUTOMATED COUNT: 19.2 % (ref 10–15)
GFR SERPL CREATININE-BSD FRML MDRD: >90 ML/MIN/1.73M2
GLUCOSE SERPL-MCNC: 79 MG/DL (ref 70–99)
GRAM STAIN RESULT: NORMAL
GRAM STAIN RESULT: NORMAL
HCT VFR BLD AUTO: 26.9 % (ref 40–53)
HGB BLD-MCNC: 8.2 G/DL (ref 13.3–17.7)
INR PPP: 2.99 (ref 0.85–1.15)
MCH RBC QN AUTO: 25.8 PG (ref 26.5–33)
MCHC RBC AUTO-ENTMCNC: 30.5 G/DL (ref 31.5–36.5)
MCV RBC AUTO: 85 FL (ref 78–100)
PLATELET # BLD AUTO: 281 10E3/UL (ref 150–450)
POTASSIUM SERPL-SCNC: 4.5 MMOL/L (ref 3.4–5.3)
RBC # BLD AUTO: 3.18 10E6/UL (ref 4.4–5.9)
SODIUM SERPL-SCNC: 134 MMOL/L (ref 136–145)
WBC # BLD AUTO: 6.2 10E3/UL (ref 4–11)

## 2023-03-18 PROCEDURE — 360N000076 HC SURGERY LEVEL 3, PER MIN: Performed by: THORACIC SURGERY (CARDIOTHORACIC VASCULAR SURGERY)

## 2023-03-18 PROCEDURE — 87102 FUNGUS ISOLATION CULTURE: CPT | Performed by: THORACIC SURGERY (CARDIOTHORACIC VASCULAR SURGERY)

## 2023-03-18 PROCEDURE — 87040 BLOOD CULTURE FOR BACTERIA: CPT | Performed by: PHYSICIAN ASSISTANT

## 2023-03-18 PROCEDURE — 87205 SMEAR GRAM STAIN: CPT | Performed by: THORACIC SURGERY (CARDIOTHORACIC VASCULAR SURGERY)

## 2023-03-18 PROCEDURE — 258N000003 HC RX IP 258 OP 636: Performed by: THORACIC SURGERY (CARDIOTHORACIC VASCULAR SURGERY)

## 2023-03-18 PROCEDURE — 258N000003 HC RX IP 258 OP 636: Performed by: ANESTHESIOLOGY

## 2023-03-18 PROCEDURE — 250N000009 HC RX 250: Performed by: NURSE ANESTHETIST, CERTIFIED REGISTERED

## 2023-03-18 PROCEDURE — 87077 CULTURE AEROBIC IDENTIFY: CPT | Performed by: THORACIC SURGERY (CARDIOTHORACIC VASCULAR SURGERY)

## 2023-03-18 PROCEDURE — 87070 CULTURE OTHR SPECIMN AEROBIC: CPT | Performed by: THORACIC SURGERY (CARDIOTHORACIC VASCULAR SURGERY)

## 2023-03-18 PROCEDURE — 85027 COMPLETE CBC AUTOMATED: CPT | Performed by: PHYSICIAN ASSISTANT

## 2023-03-18 PROCEDURE — 999N000141 HC STATISTIC PRE-PROCEDURE NURSING ASSESSMENT: Performed by: THORACIC SURGERY (CARDIOTHORACIC VASCULAR SURGERY)

## 2023-03-18 PROCEDURE — 250N000011 HC RX IP 250 OP 636: Performed by: THORACIC SURGERY (CARDIOTHORACIC VASCULAR SURGERY)

## 2023-03-18 PROCEDURE — 214N000001 HC R&B CCU UMMC

## 2023-03-18 PROCEDURE — 36415 COLL VENOUS BLD VENIPUNCTURE: CPT | Performed by: PHYSICIAN ASSISTANT

## 2023-03-18 PROCEDURE — 0W980ZZ DRAINAGE OF CHEST WALL, OPEN APPROACH: ICD-10-PCS | Performed by: THORACIC SURGERY (CARDIOTHORACIC VASCULAR SURGERY)

## 2023-03-18 PROCEDURE — 272N000001 HC OR GENERAL SUPPLY STERILE: Performed by: THORACIC SURGERY (CARDIOTHORACIC VASCULAR SURGERY)

## 2023-03-18 PROCEDURE — 250N000013 HC RX MED GY IP 250 OP 250 PS 637: Performed by: SURGERY

## 2023-03-18 PROCEDURE — 80048 BASIC METABOLIC PNL TOTAL CA: CPT | Performed by: PHYSICIAN ASSISTANT

## 2023-03-18 PROCEDURE — 87075 CULTR BACTERIA EXCEPT BLOOD: CPT | Performed by: THORACIC SURGERY (CARDIOTHORACIC VASCULAR SURGERY)

## 2023-03-18 PROCEDURE — 258N000003 HC RX IP 258 OP 636: Performed by: PHYSICIAN ASSISTANT

## 2023-03-18 PROCEDURE — 250N000011 HC RX IP 250 OP 636: Performed by: PHYSICIAN ASSISTANT

## 2023-03-18 PROCEDURE — 250N000009 HC RX 250: Performed by: PHYSICIAN ASSISTANT

## 2023-03-18 PROCEDURE — 10180 I&D COMPLEX PO WOUND INFCTJ: CPT | Performed by: THORACIC SURGERY (CARDIOTHORACIC VASCULAR SURGERY)

## 2023-03-18 PROCEDURE — 250N000011 HC RX IP 250 OP 636: Performed by: STUDENT IN AN ORGANIZED HEALTH CARE EDUCATION/TRAINING PROGRAM

## 2023-03-18 PROCEDURE — 250N000011 HC RX IP 250 OP 636: Performed by: ANESTHESIOLOGY

## 2023-03-18 PROCEDURE — 250N000013 HC RX MED GY IP 250 OP 250 PS 637: Performed by: PHYSICIAN ASSISTANT

## 2023-03-18 PROCEDURE — 370N000017 HC ANESTHESIA TECHNICAL FEE, PER MIN: Performed by: THORACIC SURGERY (CARDIOTHORACIC VASCULAR SURGERY)

## 2023-03-18 PROCEDURE — 710N000010 HC RECOVERY PHASE 1, LEVEL 2, PER MIN: Performed by: THORACIC SURGERY (CARDIOTHORACIC VASCULAR SURGERY)

## 2023-03-18 PROCEDURE — 250N000011 HC RX IP 250 OP 636: Performed by: NURSE ANESTHETIST, CERTIFIED REGISTERED

## 2023-03-18 PROCEDURE — 85610 PROTHROMBIN TIME: CPT | Performed by: PHYSICIAN ASSISTANT

## 2023-03-18 PROCEDURE — 250N000013 HC RX MED GY IP 250 OP 250 PS 637: Performed by: ANESTHESIOLOGY

## 2023-03-18 RX ORDER — MEPERIDINE HYDROCHLORIDE 25 MG/ML
12.5 INJECTION INTRAMUSCULAR; INTRAVENOUS; SUBCUTANEOUS EVERY 5 MIN PRN
Status: DISCONTINUED | OUTPATIENT
Start: 2023-03-18 | End: 2023-03-18 | Stop reason: HOSPADM

## 2023-03-18 RX ORDER — FENTANYL CITRATE 50 UG/ML
50 INJECTION, SOLUTION INTRAMUSCULAR; INTRAVENOUS EVERY 5 MIN PRN
Status: DISCONTINUED | OUTPATIENT
Start: 2023-03-18 | End: 2023-03-18 | Stop reason: HOSPADM

## 2023-03-18 RX ORDER — KETAMINE HYDROCHLORIDE 10 MG/ML
INJECTION INTRAMUSCULAR; INTRAVENOUS PRN
Status: DISCONTINUED | OUTPATIENT
Start: 2023-03-18 | End: 2023-03-18

## 2023-03-18 RX ORDER — OXYCODONE HYDROCHLORIDE 5 MG/1
5 TABLET ORAL EVERY 4 HOURS PRN
Status: DISCONTINUED | OUTPATIENT
Start: 2023-03-18 | End: 2023-03-22

## 2023-03-18 RX ORDER — HYDROMORPHONE HCL IN WATER/PF 6 MG/30 ML
0.2 PATIENT CONTROLLED ANALGESIA SYRINGE INTRAVENOUS EVERY 5 MIN PRN
Status: DISCONTINUED | OUTPATIENT
Start: 2023-03-18 | End: 2023-03-18

## 2023-03-18 RX ORDER — FENTANYL CITRATE 50 UG/ML
25 INJECTION, SOLUTION INTRAMUSCULAR; INTRAVENOUS EVERY 5 MIN PRN
Status: DISCONTINUED | OUTPATIENT
Start: 2023-03-18 | End: 2023-03-18

## 2023-03-18 RX ORDER — NALOXONE HYDROCHLORIDE 0.4 MG/ML
0.2 INJECTION, SOLUTION INTRAMUSCULAR; INTRAVENOUS; SUBCUTANEOUS
Status: DISCONTINUED | OUTPATIENT
Start: 2023-03-18 | End: 2023-03-26 | Stop reason: HOSPADM

## 2023-03-18 RX ORDER — PROPOFOL 10 MG/ML
INJECTION, EMULSION INTRAVENOUS PRN
Status: DISCONTINUED | OUTPATIENT
Start: 2023-03-18 | End: 2023-03-18

## 2023-03-18 RX ORDER — SODIUM CHLORIDE, SODIUM LACTATE, POTASSIUM CHLORIDE, CALCIUM CHLORIDE 600; 310; 30; 20 MG/100ML; MG/100ML; MG/100ML; MG/100ML
INJECTION, SOLUTION INTRAVENOUS CONTINUOUS
Status: DISCONTINUED | OUTPATIENT
Start: 2023-03-18 | End: 2023-03-18 | Stop reason: HOSPADM

## 2023-03-18 RX ORDER — MEPERIDINE HYDROCHLORIDE 25 MG/ML
12.5 INJECTION INTRAMUSCULAR; INTRAVENOUS; SUBCUTANEOUS EVERY 5 MIN PRN
Status: DISCONTINUED | OUTPATIENT
Start: 2023-03-18 | End: 2023-03-18

## 2023-03-18 RX ORDER — ONDANSETRON 2 MG/ML
4 INJECTION INTRAMUSCULAR; INTRAVENOUS EVERY 30 MIN PRN
Status: DISCONTINUED | OUTPATIENT
Start: 2023-03-18 | End: 2023-03-18 | Stop reason: HOSPADM

## 2023-03-18 RX ORDER — FENTANYL CITRATE 50 UG/ML
25 INJECTION, SOLUTION INTRAMUSCULAR; INTRAVENOUS EVERY 5 MIN PRN
Status: DISCONTINUED | OUTPATIENT
Start: 2023-03-18 | End: 2023-03-18 | Stop reason: HOSPADM

## 2023-03-18 RX ORDER — OXYCODONE HYDROCHLORIDE 5 MG/1
5 TABLET ORAL
Status: COMPLETED | OUTPATIENT
Start: 2023-03-18 | End: 2023-03-18

## 2023-03-18 RX ORDER — FENTANYL CITRATE 50 UG/ML
INJECTION, SOLUTION INTRAMUSCULAR; INTRAVENOUS PRN
Status: DISCONTINUED | OUTPATIENT
Start: 2023-03-18 | End: 2023-03-18

## 2023-03-18 RX ORDER — NALOXONE HYDROCHLORIDE 0.4 MG/ML
0.4 INJECTION, SOLUTION INTRAMUSCULAR; INTRAVENOUS; SUBCUTANEOUS
Status: DISCONTINUED | OUTPATIENT
Start: 2023-03-18 | End: 2023-03-26 | Stop reason: HOSPADM

## 2023-03-18 RX ORDER — HYDROMORPHONE HCL IN WATER/PF 6 MG/30 ML
0.2 PATIENT CONTROLLED ANALGESIA SYRINGE INTRAVENOUS EVERY 4 HOURS PRN
Status: DISCONTINUED | OUTPATIENT
Start: 2023-03-18 | End: 2023-03-23

## 2023-03-18 RX ORDER — ALBUTEROL SULFATE 0.83 MG/ML
2.5 SOLUTION RESPIRATORY (INHALATION) EVERY 4 HOURS PRN
Status: DISCONTINUED | OUTPATIENT
Start: 2023-03-18 | End: 2023-03-18

## 2023-03-18 RX ORDER — BUPIVACAINE HYDROCHLORIDE 2.5 MG/ML
INJECTION, SOLUTION INFILTRATION; PERINEURAL PRN
Status: DISCONTINUED | OUTPATIENT
Start: 2023-03-18 | End: 2023-03-18 | Stop reason: HOSPADM

## 2023-03-18 RX ORDER — WARFARIN SODIUM 5 MG/1
5 TABLET ORAL
Status: COMPLETED | OUTPATIENT
Start: 2023-03-18 | End: 2023-03-18

## 2023-03-18 RX ORDER — VANCOMYCIN HYDROCHLORIDE 1 G/200ML
1000 INJECTION, SOLUTION INTRAVENOUS EVERY 12 HOURS
Status: DISCONTINUED | OUTPATIENT
Start: 2023-03-19 | End: 2023-03-20

## 2023-03-18 RX ORDER — ALBUTEROL SULFATE 0.83 MG/ML
2.5 SOLUTION RESPIRATORY (INHALATION) EVERY 4 HOURS PRN
Status: DISCONTINUED | OUTPATIENT
Start: 2023-03-18 | End: 2023-03-18 | Stop reason: HOSPADM

## 2023-03-18 RX ORDER — HYDROMORPHONE HCL IN WATER/PF 6 MG/30 ML
0.4 PATIENT CONTROLLED ANALGESIA SYRINGE INTRAVENOUS EVERY 5 MIN PRN
Status: DISCONTINUED | OUTPATIENT
Start: 2023-03-18 | End: 2023-03-18 | Stop reason: HOSPADM

## 2023-03-18 RX ORDER — HYDROMORPHONE HCL IN WATER/PF 6 MG/30 ML
0.2 PATIENT CONTROLLED ANALGESIA SYRINGE INTRAVENOUS EVERY 5 MIN PRN
Status: DISCONTINUED | OUTPATIENT
Start: 2023-03-18 | End: 2023-03-18 | Stop reason: HOSPADM

## 2023-03-18 RX ORDER — ONDANSETRON 4 MG/1
4 TABLET, ORALLY DISINTEGRATING ORAL EVERY 30 MIN PRN
Status: DISCONTINUED | OUTPATIENT
Start: 2023-03-18 | End: 2023-03-18 | Stop reason: HOSPADM

## 2023-03-18 RX ORDER — SODIUM CHLORIDE, SODIUM LACTATE, POTASSIUM CHLORIDE, CALCIUM CHLORIDE 600; 310; 30; 20 MG/100ML; MG/100ML; MG/100ML; MG/100ML
INJECTION, SOLUTION INTRAVENOUS CONTINUOUS
Status: DISCONTINUED | OUTPATIENT
Start: 2023-03-18 | End: 2023-03-18

## 2023-03-18 RX ADMIN — PROPOFOL 10 MG: 10 INJECTION, EMULSION INTRAVENOUS at 19:03

## 2023-03-18 RX ADMIN — FAMOTIDINE 20 MG: 20 TABLET ORAL at 08:12

## 2023-03-18 RX ADMIN — DIGOXIN 125 MCG: 125 TABLET ORAL at 08:12

## 2023-03-18 RX ADMIN — Medication 10 MG: at 18:51

## 2023-03-18 RX ADMIN — LISINOPRIL 10 MG: 10 TABLET ORAL at 09:41

## 2023-03-18 RX ADMIN — SODIUM CHLORIDE, POTASSIUM CHLORIDE, SODIUM LACTATE AND CALCIUM CHLORIDE: 600; 310; 30; 20 INJECTION, SOLUTION INTRAVENOUS at 18:14

## 2023-03-18 RX ADMIN — GABAPENTIN 100 MG: 100 CAPSULE ORAL at 08:12

## 2023-03-18 RX ADMIN — HYDROXYCHLOROQUINE SULFATE 200 MG: 200 TABLET, FILM COATED ORAL at 08:12

## 2023-03-18 RX ADMIN — HYDROMORPHONE HYDROCHLORIDE 0.2 MG: 0.2 INJECTION, SOLUTION INTRAMUSCULAR; INTRAVENOUS; SUBCUTANEOUS at 19:47

## 2023-03-18 RX ADMIN — MIDAZOLAM 1 MG: 1 INJECTION INTRAMUSCULAR; INTRAVENOUS at 18:26

## 2023-03-18 RX ADMIN — Medication 1 TABLET: at 08:11

## 2023-03-18 RX ADMIN — TAMSULOSIN HYDROCHLORIDE 0.4 MG: 0.4 CAPSULE ORAL at 08:12

## 2023-03-18 RX ADMIN — FENTANYL CITRATE 25 MCG: 50 INJECTION, SOLUTION INTRAMUSCULAR; INTRAVENOUS at 18:49

## 2023-03-18 RX ADMIN — WARFARIN SODIUM 5 MG: 5 TABLET ORAL at 21:54

## 2023-03-18 RX ADMIN — PROPOFOL 10 MG: 10 INJECTION, EMULSION INTRAVENOUS at 19:01

## 2023-03-18 RX ADMIN — Medication 20 MG: at 18:46

## 2023-03-18 RX ADMIN — DIAZEPAM 2 MG: 2 TABLET ORAL at 21:54

## 2023-03-18 RX ADMIN — ATORVASTATIN CALCIUM 40 MG: 40 TABLET, FILM COATED ORAL at 08:12

## 2023-03-18 RX ADMIN — VANCOMYCIN HYDROCHLORIDE 1500 MG: 10 INJECTION, POWDER, LYOPHILIZED, FOR SOLUTION INTRAVENOUS at 15:33

## 2023-03-18 RX ADMIN — MIDAZOLAM 1 MG: 1 INJECTION INTRAMUSCULAR; INTRAVENOUS at 18:14

## 2023-03-18 RX ADMIN — OXYCODONE HYDROCHLORIDE 5 MG: 5 TABLET ORAL at 20:30

## 2023-03-18 RX ADMIN — ACETAMINOPHEN 975 MG: 325 TABLET ORAL at 08:11

## 2023-03-18 RX ADMIN — HYDROMORPHONE HYDROCHLORIDE 0.2 MG: 0.2 INJECTION, SOLUTION INTRAMUSCULAR; INTRAVENOUS; SUBCUTANEOUS at 20:06

## 2023-03-18 RX ADMIN — GABAPENTIN 100 MG: 100 CAPSULE ORAL at 14:02

## 2023-03-18 RX ADMIN — HYDROMORPHONE HYDROCHLORIDE 0.2 MG: 0.2 INJECTION, SOLUTION INTRAMUSCULAR; INTRAVENOUS; SUBCUTANEOUS at 23:39

## 2023-03-18 RX ADMIN — SODIUM CHLORIDE, POTASSIUM CHLORIDE, SODIUM LACTATE AND CALCIUM CHLORIDE: 600; 310; 30; 20 INJECTION, SOLUTION INTRAVENOUS at 17:09

## 2023-03-18 RX ADMIN — Medication 10 MG: at 18:20

## 2023-03-18 RX ADMIN — ASPIRIN 81 MG: 81 TABLET ORAL at 08:11

## 2023-03-18 RX ADMIN — FENTANYL CITRATE 25 MCG: 50 INJECTION, SOLUTION INTRAMUSCULAR; INTRAVENOUS at 18:56

## 2023-03-18 RX ADMIN — Medication 10 MG: at 18:41

## 2023-03-18 RX ADMIN — MINOCYCLINE HYDROCHLORIDE 100 MG: 100 CAPSULE ORAL at 08:13

## 2023-03-18 RX ADMIN — ACETAMINOPHEN 975 MG: 325 TABLET ORAL at 23:39

## 2023-03-18 ASSESSMENT — ACTIVITIES OF DAILY LIVING (ADL)
ADLS_ACUITY_SCORE: 26
DEPENDENT_IADLS:: INDEPENDENT
ADLS_ACUITY_SCORE: 26

## 2023-03-18 ASSESSMENT — ENCOUNTER SYMPTOMS: ORTHOPNEA: 1

## 2023-03-18 ASSESSMENT — NEW YORK HEART ASSOCIATION (NYHA) CLASSIFICATION: NYHA FUNCTIONAL CLASS: IV

## 2023-03-18 NOTE — PROGRESS NOTES
Focus:  Admission  Diagnosis: infection  Admitted from:3C  Via: stretcher   Accompanied by: family.  Belongings: Placed in closet;patient to keep   Admission paperwork: complete.   Teaching: Call don't fall, use of console, meal times, visiting hours, orientation to unit, when to call for the RN (angina/sob/dizzyness, etc.), and stressed the importance of safety.   Access: PIV   Telemetry: Placed on pt   Ht./Wt.: complete

## 2023-03-18 NOTE — ANESTHESIA PREPROCEDURE EVALUATION
Anesthesia Pre-Procedure Evaluation    Patient: Dandy Sands   MRN: [id number] : 1960        Procedure : Procedure(s):  Incision and drainage of driveline          Past Medical History:   Diagnosis Date     Antiphospholipid antibody syndrome (H)      CAD (coronary artery disease)      Chronic systolic heart failure (H)      Ischemic cardiomyopathy      Mitral regurgitation      Systemic lupus erythematosus (H)       Past Surgical History:   Procedure Laterality Date     COLONOSCOPY N/A 2022    Procedure: COLONOSCOPY;  Surgeon: Parth Meneses MD;  [facility]: [facility]     CV CORONARY ANGIOGRAM N/A 2022    Procedure: Coronary Angiogram;  Surgeon: Mike Pope MD;  [facility]: [facility]     CV CORONARY ANGIOGRAM N/A 2022    Procedure: Coronary Angiogram;  Surgeon: Austin Bright MD;  Location: [facility]     CV CORONARY LITHOTRIPSY PCI Bilateral 2022    Procedure: Percutaneous Coronary Intervention - Lithotripsy;  Surgeon: Mike Pope MD;  [facility]     CV INTRA AORTIC BALLOON N/A 2022    Procedure: Intraprocedure Aortic Balloon Pump Insertion;  Surgeon: Sherman Cerda MD;  [facility]     CV INTRA AORTIC BALLOON Right 2022    Procedure: Reposition of Subclavian Intraprocedure Aortic Balloon Pump;  Surgeon: Austin Bright MD;  [facility]     CV INTRAVASULAR ULTRASOUND N/A 2022    Procedure: Intravascular Ultrasound;  Surgeon: Mike Pope MD;  [facility]     CV PCI ANGIOPLASTY N/A 2022    Procedure: Percutaneous Transluminal Angioplasty;  Surgeon: Austin Bright MD;  [facility]     CV PCI STENT DRUG ELUTING N/A 2022    Procedure: Percutaneous Coronary Intervention Stent;  Surgeon: Mike Pope MD;  [facility]     CV RIGHT HEART CATH  MEASUREMENTS RECORDED N/A 05/18/2022    Procedure: Right Heart Catheterization;  Surgeon: Justo Stewart MD;  Location:  HE[facility] CARDIAC CATH LAB     CV RIGHT HEART CATH MEASUREMENTS RECORDED N/A 05/24/2022    Procedure: Right Heart Catheterization;  Surgeon: Mike Pope MD;  Location:  HE[facility] CARDIAC CATH LAB     CV RIGHT HEART CATH MEASUREMENTS RECORDED N/A 05/29/2022    Procedure: Right Heart Catheterization;  Surgeon: Austin Bright MD;  Location:  HE[facility] CARDIAC CATH LAB     CV RIGHT HEART CATH MEASUREMENTS RECORDED N/A 07/01/2022    Procedure: Right Heart Catheterization;  Surgeon: Mike Pope MD;  Location:  HEART CARDIAC CATH LAB     CV RIGHT HEART CATH MEASUREMENTS RECORDED N/A 09/23/2022    Procedure: Right Heart Catheterization;  Surgeon: Justo Stewart MD;  Location:  HEART CARDIAC CATH LAB     CV RIGHT HEART EXERCISE STRESS STUDY N/A 05/18/2022    Procedure: Stress Drug Study;  Surgeon: Justo Stewart MD;  Location:  HEART CARDIAC CATH LAB     CV SWAN CHOCO PROCEDURE N/A 06/17/2022    Procedure: Rochelle Choco Procedure;  Surgeon: Sherman Cerda MD;  Location:  HEART CARDIAC CATH LAB     EP PACEMAKER OR ICD LEAD IMPLANT-ONE LEAD N/A 1/13/2023    Procedure: CRT-D Lead revision;  Surgeon: Beckie Maurice MD;  Location:  HEART CARDIAC CATH LAB     INCISION AND DRAINAGE CHEST WASHOUT, COMBINED N/A 07/11/2022    Procedure: Chest washout and closure;  Surgeon: Darnell Morgan MD;  [facility]     INCISION AND DRAINAGE CHEST WASHOUT, COMBINED N/A 07/12/2022    Procedure: INCISION AND DRAINAGE, WOUND, CHEST, WITH IRRIGATION;  Surgeon: Darius Zamora MD;  [facility]     INJECT NERVE OTHER PERIPHERAL Left 1/16/2023    Procedure: Cryoablation of intercostal nerves;  Surgeon: Kenroy Nguyen MD;  Location: [facility]     INSERT ARTERIAL LINE  07/18/2022          INSERT INTRAAORTIC BALLOON PUMP Right 06/23/2022    Procedure: INSERTION, INTRA-AORTIC BALLOON PUMP RIGHT  SUBCLAVIAN ARTERY.;  Surgeon: Hayden Veras MD;  Location: [facility]     INSERT VENTRICULAR ASSIST DEVICE LEFT (HEARTMATE II/III) MINIMALLY INVASIVE N/A 07/08/2022    Procedure: MEDIAN STERNOTOMY.  CARDIOPULMONARY BYPASS.  INTRAOPERATIVE TRANSESOPHAGEAL ECHOCARDIOGRAM.  IMPLANT LEFT VENTRICULAR ASSIST DEVICE HEARTMATE III.  PLACEMENT OF PERCUTANEOUS RIGHT VENTRICULAR ASSIST DEVICE.  TEMPORARY CHEST CLOSURE;  Surgeon: Darius Zamora MD;  Location: [facility]     IR CHEST TUBE PLACEMENT NON-TUNNELED RIGHT  08/01/2022     IRRIGATION AND DEBRIDEMENT CHEST WASHOUT, COMBINED N/A 07/13/2022    Procedure: IRRIGATION AND DEBRIDEMENT, CHEST;  Surgeon: Darius Zamora MD;  [facility]     MIDLINE DOUBLE LUMEN PLACEMENT Left 09/11/2022    Mid line ok to use     MIDLINE DOUBLE LUMEN PLACEMENT Right 09/14/2022    5FR DL midline.     PICC DOUBLE LUMEN PLACEMENT Right 05/28/2022    right basilic 5 fr dl picc 40 cm     PICC DOUBLE LUMEN PLACEMENT Right 08/15/2022    Right Lateral, 41 total length, 3 cm external length     PICC SINGLE LUMEN PLACEMENT Right 11/01/2022    41cm (2cm external), Lateral brachial vein, low SVC      No Known Allergies   Social History     Tobacco Use     Smoking status: Former     Smokeless tobacco: Never   Substance Use Topics     Alcohol use: Not on file      Wt Readings from Last 1 Encounters:   03/17/23 68.9 kg (151 lb 14.4 oz)        Anesthesia Evaluation   Pt has had prior anesthetic. Type: General and MAC.    No history of anesthetic complications       ROS/MED HX  ENT/Pulmonary:  - neg pulmonary ROS     Neurologic:  - neg neurologic ROS     Cardiovascular: Comment: HM 3  IC CM    (+) --CAD ---CHF (ICM) Last EF: 10 NYHA classification: IV. CASEY. orthopnea/PND, pacemaker, - Patientt is not dependent on pacemaker. ICD - Patient is dependent on ICD. valvular problems/murmurs type: AI Previous cardiac testing  (-) pulmonary hypertension   METS/Exercise Tolerance: 1 - Eating, dressing     Hematologic:     (+) History of blood clots, pt is anticoagulated, history of blood transfusion, no previous transfusion reaction, Known PRBC Anitbodies:No     Musculoskeletal:  - neg musculoskeletal ROS     GI/Hepatic:  - neg GI/hepatic ROS     Renal/Genitourinary:  - neg Renal ROS     Endo:  - neg endo ROS     Psychiatric/Substance Use:  - neg psychiatric ROS     Infectious Disease: Comment: Infected driveline      Malignancy:  - neg malignancy ROS     Other: Comment: Antiphospholipid Ab syndrome  SLE           Physical Exam    Airway        Mallampati: II   TM distance: > 3 FB   Neck ROM: full   Mouth opening: > 3 cm    Respiratory Devices and Support         Dental       (+) Multiple visibly decayed, broken teeth      Cardiovascular       Comment: Difficult to hear over hum of LVAD      Pulmonary           breath sounds clear to auscultation           OUTSIDE LABS:  CBC:   Lab Results   Component Value Date    WBC 6.2 03/18/2023    WBC 7.9 03/17/2023    HGB 8.2 (L) 03/18/2023    HGB 7.8 (L) 03/17/2023    HCT 26.9 (L) 03/18/2023    HCT 25.9 (L) 03/17/2023     03/18/2023     03/17/2023     BMP:   Lab Results   Component Value Date     (L) 03/18/2023     (L) 03/17/2023    POTASSIUM 4.5 03/18/2023    POTASSIUM 4.3 03/17/2023    CHLORIDE 104 03/18/2023    CHLORIDE 101 03/17/2023    CO2 19 (L) 03/18/2023    CO2 19 (L) 03/17/2023    BUN 20.3 03/18/2023    BUN 23.2 (H) 03/17/2023    CR 0.76 03/18/2023    CR 0.78 03/17/2023    GLC 79 03/18/2023    GLC 84 03/17/2023     COAGS:   Lab Results   Component Value Date    PTT 65 (H) 08/07/2022    INR 2.99 (H) 03/18/2023    FIBR 376 08/07/2022     POC: No results found for: BGM, HCG, HCGS  HEPATIC:   Lab Results   Component Value Date    ALBUMIN 3.2 (L) 03/17/2023    PROTTOTAL 7.5 03/17/2023    ALT 15 03/17/2023    AST 22 03/17/2023    ALKPHOS 192 (H) 03/17/2023    BILITOTAL 0.5 03/17/2023    MATTHIEU 34 08/07/2022     OTHER:   Lab Results   Component  Value Date    PH 7.42 08/08/2022    LACT 1.6 10/28/2022    A1C 5.5 05/20/2022    ELDA 9.6 03/18/2023    PHOS 3.8 09/04/2022    MAG 1.7 11/02/2022    LIPASE 69 (H) 10/13/2022    TSH 1.67 09/18/2022    CRP 28.0 (H) 06/19/2022    SED 39 (H) 06/19/2022       Anesthesia Plan    ASA Status:  4   NPO Status:  NPO Appropriate    Anesthesia Type: MAC.     - Reason for MAC: straight local not clinically adequate   Induction: Intravenous.      Techniques and Equipment:       - Blood: T&S     Consents    Anesthesia Plan(s) and associated risks, benefits, and realistic alternatives discussed. Questions answered and patient/representative(s) expressed understanding.    - Discussed:     - Discussed with:  Patient      - Extended Intubation/Ventilatory Support Discussed: No.      - Patient is DNR/DNI Status: No    Use of blood products discussed: No .     Postoperative Care    Pain management: IV analgesics.   PONV prophylaxis: Ondansetron (or other 5HT-3)     Comments:    Other Comments: Patient seen and examined.  Risks, benefits and alternatives to MAC with possibility of GETA discussed.  Patient made aware of the possibility of periprocedural awareness given MAC.  Questions answered and patient wishes to proceed            Joseph Love MD

## 2023-03-18 NOTE — PROGRESS NOTES
Cardiovascular Surgery Progress Note  03/18/2023         Assessment and Plan:     Dandy Sands is a 62 year old male with a PMH of SLE, antiphospholipid syndrome, CAD, CHF 2/2 ICM s/p LVAD 7/8/22, history of C.diff who presents from clinic for management of chronic driveline infection. Plan for I&D of driveline 3/18 with Dr. Zamora.      Cardiovascular:   S/p LVAD placement (HeartMate 3) on 7/8/22   Advanced ischemic cardiomyopathy, class IV, stage D  CAD s/p PCI to LAD (2005)  S/p CRT-D (2006)  History of MI (2007)  Severe MR  Antiphospholipid antibody syndrome on chronic warfarin  HTN, HLD  No arrhythmias overnight, HD stable, in NSR.  Most recent echo showed LV EF 15-20%  - PTA ASA 81 mg daily  - PTA Atorvastatin 40 mg daily  - BB  - continue PTA digoxin  - Diuresis as below    Pulmonary:  Saturating well on RA  - Supplemental O2 PRN to keep sats > 92%. Wean off as tolerated.  - Pulm hygiene, IS, activity and deep breathing    Neurology / MSK:  Acute post-operative pain   Pain well controlled with current regimen:  - Acetaminophen     / Renal / Fluid / Electrolytes:  BL creat ~ 0.7, most recent creatinine 0.78; adequate UOP.   FLUID STATUS; I/O past 24 hours: -1.1L  - Diuresis not indicated, PTA 20 mg lasix prn   - Replete lytes per protocol  - Strict I/O, daily weights  - Avoid/limit nephrotoxins as able    GI / Nutrition:   - Currently NPO for I&D today  - Continue bowel regimen    Endocrine:  Stress induced hyperglycemia   Hgb A1c 5.5    Infectious Disease:  WBC 7.9, remains afebrile, no signs or symptoms of infection  - Completed perioperative antibiotics  - Continue to monitor fever curve, CBC  ID to be consulted after OR per their note started vancomycin    Hematology:   Hgb 7.8; Plt 277, no signs or symptoms of active bleeding  - CBC daily    Anticoagulation:   - ASA 81 mg daiy  - Coumadin for LVAD, INR goal 2-3. Most recent INR 2.99.      MSK/Skin:  Sternotomy  Surgical incision  - Sternal  precautions  - Incisional cares per protocol    Prophylaxis:   - Stress ulcer prophylaxis: Pantoprazole 40 mg daily for 30 days  - DVT prophylaxis: Subcutaneous heparin, SCD      Discussed with Dr. Noah Zabala PA-C   Cardiothoracic Surgery   Pager #[phone]  March 18, 2023 at 1:38 PM        Interval History:     NPO for I&D. Plan to do today  Denies chest pain, palpitations, dizziness, syncopal symptoms, fevers, chills, myalgias, or sternal popping/clicking.         Physical Exam:     Gen: A&Ox4, NAD  Neuro: no focal deficits   CV: RRR, normal S1 S2, no murmurs, rubs or gallops  Pulm: CTA, no wheezing or rhonchi, normal breathing on RA  Abd: nondistended, normal BS, soft, nontender  Ext: no peripheral edema  Incision: clean, dry, intact, mild erythema around inferior border of sternal incision, sternum stable  Lines: driveline dressing continues to drain  LVAD: speed 5100, flow 3.9, PI 4.4, power 3.6.          Data:    Imaging:  reviewed recent imaging, no acute concerns    Recent Results (from the past 24 hour(s))   Cardiac Device Check - Inpatient   Result Value    Date Time Interrogation Session [id number]    Implantable Pulse Generator  Medtronic    Implantable Pulse Generator Model [id number] Eliseo TIRADO DR    Implantable Pulse Generator Serial Number [id number]    Type Interrogation Session In Clinic    Clinic Name [facility]    Implantable Pulse Generator Type Defibrillator    Implantable Pulse Generator Implant Date 20180412    Implantable Lead  Medtronic    Implantable Lead Model 5076 CapSureFix Novus MRI SureScan    Implantable Lead Serial Number [id number]    Implantable Lead Implant Date 20230113    Implantable Lead Polarity Type Bipolar Lead    Implantable Lead Location Detail 1 APEX    Implantable Lead Special Function 52 cm    Implantable Lead Location Right Ventricle    Implantable Lead  Medtronic    Implantable Lead Model  5076 CapSureFix Novus    Implantable Lead Serial Number [id number]    Implantable Lead Implant Date 20060306    Implantable Lead Polarity Type Bipolar Lead    Implantable Lead Location Detail 1 APPENDAGE    Implantable Lead Special Function 45 cm    Implantable Lead Location Right Atrium    Implantable Lead  Medtronic    Implantable Lead Model 6949 Fidel Sal    Implantable Lead Serial Number [id number]    Implantable Lead Implant Date 20060306    Implantable Lead Polarity Type Bipolar Lead    Implantable Lead Location Detail 1 APEX    Implantable Lead Special Function      In Use - Partially Reused (Paced/Sensed portion of the ICD lead is capped)58 cm    Implantable Lead Location Right Ventricle    Marcos Setting Mode (NBG Code) MVP_AAI_DDD    Marcos Setting Lower Rate Limit 50    Marcos Setting Maximum Tracking Rate 130    Marocs Setting Maximum Sensor Rate 115    Marcos Setting Hysterisis Rate DISABLED    Marcos Setting TRISTEN Delay Low 350    Marcos Setting PAV Delay Low 350    Marcos Setting AT Mode Switch Rate 171    Lead Channel Setting Sensing Polarity Bipolar    Lead Channel Setting Sensing Anode Location Right Atrium    Lead Channel Setting Sensing Anode Terminal Ring    Lead Channel Setting Sensing Cathode Location Right Atrium    Lead Channel Setting Sensing Cathode Terminal Tip    Lead Channel Setting Sensing Sensitivity 0.15    Lead Channel Setting Sensing Polarity Bipolar    Lead Channel Setting Sensing Anode Location Right Ventricle    Lead Channel Setting Sensing Anode Terminal Ring    Lead Channel Setting Sensing Cathode Location Right Ventricle    Lead Channel Setting Sensing Cathode Terminal Tip    Lead Channel Setting Sensing Sensitivity 0.3    Lead Channel Setting Pacing Polarity Bipolar    Lead Channel Setting Pacing Anode Location Right Atrium    Lead Channel Setting Pacing Anode Terminal Ring    Lead Channel Setting Sensing Cathode Location Right Atrium    Lead Channel Setting Sensing  Cathode Terminal Tip    Lead Channel Setting Pacing Pulse Width 0.4    Lead Channel Setting Pacing Amplitude 1.5    Lead Channel Setting Pacing Capture Mode Adaptive    Lead Channel Setting Pacing Polarity Bipolar    Lead Channel Setting Pacing Anode Location Right Ventricle    Lead Channel Setting Pacing Anode Terminal Ring    Lead Channel Setting Sensing Cathode Location Right Ventricle    Lead Channel Setting Sensing Cathode Terminal Tip    Lead Channel Setting Pacing Pulse Width 0.4    Lead Channel Setting Pacing Amplitude 2    Lead Channel Setting Pacing Capture Mode Adaptive    Zone Setting Type Category VF    Zone Setting Detection Interval 320    Zone Setting Detection Beats Numerator 30    Zone Setting Detection Beats Denominator 40    Zone Setting Type Category VT    Zone Setting Detection Interval 280    Zone Setting Type Category VT    Zone Setting Detection Interval 350    Zone Setting Type Category VT    Zone Setting Detection Interval 400    Zone Setting Type Category ATRIAL_FIBRILLATION    Zone Setting Type Category AT/AF    Zone Setting Detection Interval 350    Lead Channel Impedance Value 342    Lead Channel Sensing Intrinsic Amplitude 1.25    Lead Channel Pacing Threshold Amplitude 0.5    Lead Channel Pacing Threshold Pulse Width 0.4    Lead Channel Impedance Value 456    Lead Channel Impedance Value 361    Lead Channel Sensing Intrinsic Amplitude 3.875    Lead Channel Pacing Threshold Amplitude 0.75    Lead Channel Pacing Threshold Pulse Width 0.4    Battery Date Time of Measurements 78087747303001    Battery Status Middle of Service    Battery RRT Trigger 2.727    Battery Remaining Longevity 44    Battery Voltage 2.96    Capacitor Charge Type Reformation    Capacitor Last Charge Date Time 66888110821564    Capacitor Charge Time 3.983    Capacitor Charge Energy 18    Marcos Statistic Date Time Start 07609702898783    Marcos Statistic Date Time End 14731550348440    Marcos Statistic RA Percent Paced  0.04    Marcos Statistic RV Percent Paced 0.14    Marcos Statistic AP  Percent 0.01    Marcos Statistic AS  Percent 0.13    Marcos Statistic AP VS Percent 0.03    Marcos Statistic AS VS Percent 99.83    Atrial Tachy Statistic Date Time Start 19767323362249    Atrial Tachy Statistic Date Time End 04145097632402    Atrial Tachy Statistic AT/AF [place] Percent 0    Therapy Statistic Recent Shocks Delivered 0    Therapy Statistic Recent Shocks Aborted 0    Therapy Statistic Recent ATP Delivered 0    Therapy Statistic Recent Date Time Start 68937031715929    Therapy Statistic Recent Date Time End 1961885314    Therapy Statistic Total Shocks Delivered 1    Therapy Statistic Total Shocks Aborted 0    Therapy Statistic Total ATP Delivered 0    Therapy Statistic Total  Date Time Start 09812189832598    Therapy Statistic Total  Date Time End 01029009333379    Episode Statistic Recent Count 0    Episode Statistic Type Category AT/AF    Episode Statistic Recent Count 0    Episode Statistic Type Category SVT    Episode Statistic Recent Count 2    Episode Statistic Type Category VT    Episode Statistic Recent Count 0    Episode Statistic Type Category VF    Episode Statistic Recent Count 0    Episode Statistic Type Category VT    Episode Statistic Recent Count 0    Episode Statistic Type Category VT    Episode Statistic Recent Count 0    Episode Statistic Type Category VT    Episode Statistic Recent Date Time Start 39521094879804    Episode Statistic Recent Date Time End 23330057539505    Episode Statistic Recent Date Time Start 05165338039653    Episode Statistic Recent Date Time End 92135388833196    Episode Statistic Recent Date Time Start 86765788373589    Episode Statistic Recent Date Time End 40081110273361    Episode Statistic Recent Date Time Start 51038696933442    Episode Statistic Recent Date Time End 67174268739096    Episode Statistic Recent Date Time Start 20367546833989    Episode Statistic Recent Date Time End  96934550055497    Episode Statistic Recent Date Time Start 19371187273984    Episode Statistic Recent Date Time End 48206181338300    Episode Statistic Recent Date Time Start 21844154180920    Episode Statistic Recent Date Time End 19576312784504    Episode Statistic Total Count 36    Episode Statistic Type Category AT/AF    Episode Statistic Total Count 0    Episode Statistic Type Category SVT    Episode Statistic Total Count 12    Episode Statistic Type Category VT    Episode Statistic Total Count 1    Episode Statistic Type Category VF    Episode Statistic Total Count 0    Episode Statistic Type Category VT    Episode Statistic Total Count 0    Episode Statistic Type Category VT    Episode Statistic Total Count 1    Episode Statistic Type Category VT    Episode Statistic Total Date Time Start 14061689432671    Episode Statistic Total Date Time End 90538319631956    Episode Statistic Total Date Time Start 63637105865161    Episode Statistic Total Date Time End 09890381573016    Episode Statistic Total Date Time Start 06456566112310    Episode Statistic Total Date Time End 95096098367347    Episode Statistic Total Date Time Start 46372889762712    Episode Statistic Total Date Time End 12632612370619    Episode Statistic Total Date Time Start 27107901684425    Episode Statistic Total Date Time End 52302506424505    Episode Statistic Total Date Time Start 51735299226217    Episode Statistic Total Date Time End 25942920557307    Episode Statistic Total Date Time Start 45724165156751    Episode Statistic Total Date Time End 52053167932164    Episode Identifier 62    Episode Type Category VT    Episode Date Time [id number]    Episode Duration 1    Episode Identifier 61    Episode Type Category VT    Episode Date Time [id number]    Episode Duration 1    Narrative    Patient seen on [address] for evaluation and iterative programming of their ICD per MD orders. Pre Op Driveline flush.    Device: Medtronic [id number]  Eliseo TIRADO DR  Normal device function.  Mode: AAI<>DDD /115  AP: <0.1%  : <0.2%  Intrinsic rhythm: AS-VS 80 bpm  Thoracic Impedance: Re-establishing a baseline post LVAD placement June 2022. Trending towards basline.  Short V-V intervals: 0  Lead Trends Appear Stable.  Estimated battery longevity to RRT = 3.6 years. Battery voltage = 2.97 V.   Atrial Arrhythmia: None  AF [place]: 0%  Anticoagulant: Warfarin  Ventricular Arrhythmia: 2 Non sustained VT episodes detected, 182-194 bpm lasting 1 sec each.  Setting Changes: None    Plan: Recommend magnet placement franklin op, this will disable ICD tachy therapies.  A call was placed to Dr Davidson from anesthesia, still waiting for callback to discuss plan.    Brenda Degroot RN    Dual lead ICD    I have reviewed and interpreted the device interrogation, settings, programming and nurse's summary. The device is functioning within normal device parameters. I agree with the current findings, assessment and plan.        Labs:  Recent Labs   Lab 03/18/23  0706 03/17/23  1405 03/17/23  1355   WBC  --  7.9  --    HGB  --  7.8*  --    MCV  --  85  --    PLT  --  277  --    INR 2.99* 2.66*  --    NA  --  132*  --    POTASSIUM  --  4.3  --    CHLORIDE  --  101  --    CO2  --  19*  --    BUN  --  23.2*  --    CR  --  0.78  --    ANIONGAP  --  12  --    ELDA  --  10.3*  --    GLC  --  84 82   ALBUMIN  --  3.2*  --    PROTTOTAL  --  7.5  --    BILITOTAL  --  0.5  --    ALKPHOS  --  192*  --    ALT  --  15  --    AST  --  22  --       GLUCOSE:   Recent Labs   Lab 03/17/23  1405 03/17/23  1355   GLC 84 82

## 2023-03-18 NOTE — OR NURSING
Patient procedure cancelled today d/t previous case needing extra attention. Patient given option by team to potentially still have it tonight with no guarantee or try and wait til tomorrow. Patient will wait until tomorrow. Call made to [facility] to inform them. They have a bed ready.

## 2023-03-18 NOTE — PROGRESS NOTES
SPIRITUAL HEALTH SERVICES  SPIRITUAL ASSESSMENT Progress Note  [facility]C     REFERRAL SOURCE: Routine consult     Pt and daughter were present at time of visit.  Pt was anxious to know when his surgery would be, as he is not able to eat until afterwards.  He voiced no needs at this time but is aware of how to reach out to [facility] should that change.    PLAN: [facility] will remain available     Yoli Benedict  Pager: [phone]

## 2023-03-18 NOTE — CONSULTS
Care Management Initial Consult    General Information  Assessment completed with: Patient, Children, Asha Dorman (daughter)  Type of CM/SW Visit: Initial Assessment    Primary Care Provider verified and updated as needed: Yes   Readmission within the last 30 days:        Reason for Consult: discharge planning  Advance Care Planning: Advance Care Planning Reviewed: no concerns identified          Communication Assessment  Patient's communication style: spoken language (English or Bilingual)    Hearing Difficulty or Deaf: no   Wear Glasses or Blind: yes    Cognitive  Cognitive/Neuro/Behavioral: WDL                      Living Environment:   People in home: alone     Current living Arrangements: house (one-level townhouse)      Able to return to prior arrangements: yes       Family/Social Support:  Care provided by: self  Provides care for: no one  Marital Status: Single  Children          Description of Support System: Supportive, Involved    Support Assessment: Adequate family and caregiver support, Adequate social supports    Current Resources:   Patient receiving home care services: No     Community Resources: Cardiac Rehab  Equipment currently used at home: none  Supplies currently used at home: None    Employment/Financial:  Employment Status:          Financial Concerns: No concerns identified           Lifestyle & Psychosocial Needs:  Social Determinants of Health     Tobacco Use: Medium Risk     Smoking Tobacco Use: Former     Smokeless Tobacco Use: Never     Passive Exposure: Not on file   Alcohol Use: Not on file   Financial Resource Strain: Not on file   Food Insecurity: Not on file   Transportation Needs: Not on file   Physical Activity: Not on file   Stress: Not on file   Social Connections: Not on file   Intimate Partner Violence: Not on file   Depression: Not at risk     PHQ-2 Score: 0   Housing Stability: Not on file       Functional Status:  Prior to admission patient needed assistance:   Dependent  ADLs:: Independent  Dependent IADLs:: Independent  Assesssment of Functional Status: At functional baseline    Mental Health Status:  Mental Health Status: No Current Concerns       Chemical Dependency Status:  Chemical Dependency Status: No Current Concerns             Values/Beliefs:  Spiritual, Cultural Beliefs, Hinduism Practices, Values that affect care:                 Additional Information:  Pt with a history of SLE, antiphospholipid syndrome, CAD, CHF 2/2 ICM s/p LVAD 7/8/22, history of C.diff who presents today for management of chronic driveline infection.  Patient has had a complex history since his initial LVAD placement in 7/2022.  He was hospitalized 6/17/22 - 8/21/22 for decompensated heart failure complicated by cardiogenic shock leading to LVAD placement. 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 He was treated with Vancomycin inpatient and ultimately transitioned to Daptomycin for  2-4 weeks.  He was subsequently placed on minocycline for suppression.  He developed increased drainage in 12/2022 and received another course of IV Daptomycin for 2 weeks.  He was hospitalized 1/21 - 1/24 for dizziness and transitioned back to PO minocycline by infectious disease.    Met with patient and daughter Asha to introduce self/role and complete initial assessment.     Per pt he recently moved into a new town home. The town home is all one level and has exercise equipment available. Pt is independent in his ADL's. Pt reported he has been going to outpt rehab but recently quit d/t sternal pain after having a box fall on his chest. He has previously discharged from this facility with IV abx, received meds here and managed the med admin himself and received outpt RN once a week for PICC dressing changes. Pt's PCP was verified and pt has previously had outpt IV abx meds managed by his [place] PCP and ID team. Pt's daughter Asha is staying locally  and will be able to provide transportation home when pt discharges. Pt and Asha reported no other concerns. Are hopeful for surgery today and discharge tomorrow. Benefit check sent to [facility] and RNCC's for this unit for 3/19 and 3/20 cc'd.       Miriam Sarmiento RNCC  Covering for 6C/6D  Phone [phone]    SEARCHABLE in [facility] - search CARE COORDINATOR    [place] [employer] [place] ([phone]) Saturday & Sunday; ([phone])  Recognized Holidays    Units: 4A, 4C, 4E, 5A & 5B   Pager: [phone]    Units: 6A & 6B    Pager: [phone]    Units: 6C & 6D   Pager: [phone]    Units: 7A, 7B, 7C, 7D & 5C    Pager: [phone]    Units: [facility] [place], 5 Ortho, 5 Med/Surg, 6 Med/Surg, 8A & 10 ICU

## 2023-03-18 NOTE — PHARMACY-ANTICOAGULATION SERVICE
Updated warfarin PTA dose    Per patient has been taking warfarin 5mg on Tuesdays and Thursdays and 7.5mg all other days of the week. Anticoagulation clinic note on 3/9/2023 has same regimen just different days of the week to take the 5mg dose. Patient was made aware of the plan in the anticoagulation clinic note.    Nicole Ramirez, Pharm.D., BCPS, BCTXP  Pager [phone]

## 2023-03-18 NOTE — PHARMACY-VANCOMYCIN DOSING SERVICE
Pharmacy Vancomycin Initial Note  Date of Service 2023  Patient's  1960  62 year old, male    Indication: LVAD driveline infection (h/o Corynebacterium)    Current estimated CrCl = Estimated Creatinine Clearance: 99.5 mL/min (based on SCr of 0.76 mg/dL).    Creatinine for last 3 days  3/17/2023:  2:05 PM Creatinine 0.78 mg/dL  3/18/2023:  1:48 PM Creatinine 0.76 mg/dL    Recent Vancomycin Level(s) for last 3 days  No results found for requested labs within last 72 hours.      Vancomycin IV Administrations (past 72 hours)      No vancomycin orders with administrations in past 72 hours.                Nephrotoxins and other renal medications (From now, onward)    Start     Dose/Rate Route Frequency Ordered Stop    23 0400  vancomycin (VANCOCIN) 1000 mg in dextrose 5% 200 mL PREMIX         1,000 mg  200 mL/hr over 1 Hours Intravenous EVERY 12 HOURS 23 1449      23 1500  vancomycin (VANCOCIN) 1,500 mg in 0.9% NaCl 250 mL intermittent infusion         1,500 mg  over 90 Minutes Intravenous ONCE 23 1449      23 2030  lisinopril (ZESTRIL) tablet 10 mg        Note to Pharmacy: PTA Sig:Take 1 tablet (10 mg) by mouth 2 times daily      10 mg Oral 2 TIMES DAILY 23            Contrast Orders - past 72 hours (72h ago, onward)    None          [employer] Prediction of Planned Initial Vancomycin Regimen  Loading dose: 1500mg IV 3/18 @ 1500  Regimen: 1000 mg IV every 12 hours.  Start time: 07:48 on 2023  Exposure target: AUC24 (range)400-600 mg/L.hr   AUC24,ss: 513 mg/L.hr  Probability of AUC24 > 400: 74 %  Ctrough,ss: 15.7 mg/L  Probability of Ctrough,ss > 20: 31 %  Probability of nephrotoxicity (Lodise TAD ): 11 %          Plan:  1. Start vancomycin  1500mg IV x one, followed by 1000mg iv q12h.   2. Vancomycin monitoring method: AUC  3. Vancomycin therapeutic monitoring goal: 400-600 mg*h/L  4. Pharmacy will check vancomycin levels as appropriate in 1-3 Days.     5. Serum creatinine levels will be ordered a minimum of twice weekly.      Nicole Ramirez, Pharm.D., [facility], [facility]  Pager [phone]

## 2023-03-18 NOTE — PLAN OF CARE
Pt admitted  3/17 for scheduled I&D of chest wound. Rescheduled for today. . PMH includes SLE, antiphospholipid syndrome, CAD, CHF 2/2 ICM s/p HM3 (7/2022)., and chronic driveline infections.     Neuro: A&O x 4. Afebrile. Pleasant affect. Calls appropriately. Slept well overnight.   Cardiac: 1st degree AV/BBB 70s. MAPs 70s. Denies dizziness, chest pain, or palpitations. LVAD #s WNL. No alarms.   Respiratory: Sating well on RA. LS clear. Denies cough.  GI/: Good UOP via bedside urinal. LBM PTA. Denies nausea.   Diet/Appetite: NPO  Skin: LVAD dressing CDI. Chest wound dressing CDI.   LDA: R PIV SL  Activity: SBA  Pain: Chest pain. Declines intervention.    Plan: Continue to monitor and alert team with any changes or concerns.

## 2023-03-18 NOTE — PHARMACY-ANTICOAGULATION SERVICE
Clinical Pharmacy - Warfarin Dosing Consult     Pharmacy has been consulted to manage this patient s warfarin therapy.  Indication: LVAD/RVAD  Therapy Goal: INR 2-3  Provider/Team: Cardiothoracic surgery  OP Anticoag Clinic: [facility] anticoagulation clinic  Warfarin Prior to Admission: Yes  Warfarin PTA Regimen: 5 mg Tues, Thurs, Sun and 7.5 mg all other days of the week  Recent documented change in oral intake/nutrition: Yes, has been NPO for planned procedure.    INR   Date Value Ref Range Status   03/17/2023 2.66 (H) 0.85 - 1.15 Final     INR (External)   Date Value Ref Range Status   03/08/2023 2.4 (A) 0.9 - 1.1 Final     Factor 10 Chromogenic   Date Value Ref Range Status   08/23/2022 24 (L) 70 - 130 % Final       Recommend warfarin 5 mg today. Would normally take 7.5 mg on Fridays, but will give reduced dose given intermittent NPO, late dose, and planned procedure tomorrow (confirmed with the cardiothoracic team that okay to give warfarin tonight).  Pharmacy will monitor Dandy Sands daily and order warfarin doses to achieve specified goal.      Please contact pharmacy as soon as possible if the warfarin needs to be held for a procedure or if the warfarin goals change.      Austin Wright, PharmD, BCPS  [phone]     Detail Level: Generalized

## 2023-03-18 NOTE — PLAN OF CARE
D: Pt admitted for wound debridement. Pt stable and comfortable. AVSS, LVAD #s WNL. No pain or shortness of breath noted. Pre-op vanco given as ordered. DL dressing not changed, pre-op RN notified. Pt in OR at time of writing.    I: Monitored/assessed pt. Assisted with cares.  A: Pt stable and comfortable.  P: Continue to monitor/assess pt, contact provider with concerns.

## 2023-03-18 NOTE — PHARMACY-ADMISSION MEDICATION HISTORY
Admission Medication History Completed by Pharmacy    See [facility] Admission Navigator for allergy information, preferred outpatient pharmacy, prior to admission medications and immunization status.     Medication History Sources:     Admitting RN reviewed PTA med list and updated last dose times    Reviewed dispense history    Reviewed warfarin dose with patient    Changes made to PTA medication list (reason):    Added: None    Deleted:   o Cephalexin 500mg three times daily (dispense history was filled 1/13/2023 for 5 days, would have completed therapy)    Changed:   o Warfarin changed from 5mg Sunday, Tuesday, Thursday and 7.5mg all other days TO 5mg Tuesday and Thursday and 7.5mg all other days.     Additional Information:    Warfarin followed by [facility]. Last note on 3/9/2023.    Cefdinir 300mg by mouth every 12 hours was prescribed 2/28/2023 for 21 day supply.    Prior to Admission medications    Medication Sig Last Dose Taking? Auth Provider Long Term End Date   acetaminophen (TYLENOL) 325 MG tablet Take 3 tablets (975 mg) by mouth every 8 hours as needed for mild pain 3/17/2023 at 1200 Yes Colt Heard MD     amoxicillin (AMOXIL) 500 MG capsule Take 4 capsules (2,000 mg) by mouth as needed (take 1 hour prior to any dental procedures) Past Month Yes Kelly Lora MD     aspirin (ASA) 81 MG EC tablet Take 1 tablet (81 mg) by mouth daily 3/17/2023 at 0800 Yes Kelly Lora MD     atorvastatin (LIPITOR) 40 MG tablet Take 1 tablet (40 mg) by mouth daily 3/17/2023 at 0800 Yes Behzad Zeng MD Yes    cefdinir (OMNICEF) 300 MG capsule TAKE 1 Capsule BY MOUTH EVERY 12 HOURS FOR 21 DAYS 3/17/2023 at 0800 Yes Reported, Patient     diazepam (VALIUM) 2 MG tablet Take 1 tablet (2 mg) by mouth every 6 hours as needed for anxiety Past Week Yes Darius Zamora MD     digoxin (LANOXIN) 125 MCG tablet Take 1 tablet (125 mcg) by mouth daily 3/17/2023 at 0800 Yes Geno  Kelly VELAZQUEZ MD Yes    famotidine (PEPCID) 20 MG tablet Take 1 tablet (20 mg) by mouth 2 times daily 3/17/2023 Yes Kelly Lora MD     ferrous sulfate (FEROSUL) 325 (65 Fe) MG tablet Take 325 mg by mouth daily (with breakfast) 3/17/2023 at 0800 Yes Reported, Patient     furosemide (LASIX) 20 MG tablet Take 1 tablet (20 mg) by mouth daily as needed (For weight gain or shortness of breath) Past Month Yes Marito Mahajan MD Yes    gabapentin (NEURONTIN) 100 MG capsule TAKE 1 Capsule BY MOUTH EVERY MORNING, NOON & BEDTIME 3/17/2023 at 0800 Yes Reported, Patient     hydrALAZINE (APRESOLINE) 25 MG tablet Take 25 mg by mouth 3 times daily 3/17/2023 at 0800 Yes Reported, Patient No    hydrocortisone 1 % CREA cream Place 1 g rectally daily as needed for itching 3/17/2023 at 0800 Yes Behzad Zeng MD     hydroxychloroquine (PLAQUENIL) 200 MG tablet Take 1 tablet (200 mg) by mouth 2 times daily 3/16/2023 at 0800 Yes James Sterling MD No    lidocaine (LIDODERM) 5 % patch Place 1 patch onto the skin every 24 hours To prevent lidocaine toxicity, patient should be patch free for 12 hrs daily. More than a month Yes Darius Zamora MD     lisinopril (ZESTRIL) 10 MG tablet Take 1 tablet (10 mg) by mouth 2 times daily 3/17/2023 Yes Kelly Lora MD Yes    loperamide (IMODIUM) 2 MG capsule Take 1 capsule (2 mg) by mouth 2 times daily as needed for diarrhea Past Month Yes Behzad Zeng MD     melatonin 5 MG tablet Take 1 tablet (5 mg) by mouth At Bedtime Past Week Yes Behzad Zeng MD     methylcellulose (CITRUCEL) 500 MG TABS tablet Take 2 tablets (1,000 mg) by mouth 2 times daily 3/16/2023 Yes Behzad Zeng MD     minocycline (MINOCIN) 100 MG capsule Take 1 capsule by mouth 2 times daily (before meals) 3/17/2023 Yes Reported, Patient     multivitamin w/minerals (THERA-VIT-M) tablet Take 1 tablet by mouth daily Past Week Yes Spencer Mehta MD     pramox-pe-glycerin-petrolatum (PREPARATION H) 1-0.25-14.4-15  % CREA cream Place rectally 2 times daily as needed for hemorrhoids Apply immediately after a bowel movement. More than a month Yes Gisell Barnes MD     saline nasal (AYR SALINE) GEL topical gel Apply 1 applicator into each nare nightly as needed for congestion Past Month Yes Unknown, Entered By History     tamsulosin (FLOMAX) 0.4 MG capsule Take 1 capsule (0.4 mg) by mouth daily 3/16/2023 at 0800 Yes Behzad Zeng MD No    warfarin ANTICOAGULANT (COUMADIN) 2.5 MG tablet Take 5mg on Sunday, Tuesday, Thursday and 7.5mg all other days, or as directed by anticoagulation clinic  Patient taking differently: Take 5mg on Tuesday, Thursday and 7.5mg all other days, or as directed by anticoagulation clinic 3/16/2023 at 2000 Yes Kelly Lora MD         Date completed: 03/18/23    Medication history completed by: Nicole Ramirez, Pharm.D., BCPS, BCTXP  Pager [phone]

## 2023-03-19 LAB
ANION GAP SERPL CALCULATED.3IONS-SCNC: 11 MMOL/L (ref 7–15)
BUN SERPL-MCNC: 20.2 MG/DL (ref 8–23)
CALCIUM SERPL-MCNC: 9 MG/DL (ref 8.8–10.2)
CHLORIDE SERPL-SCNC: 100 MMOL/L (ref 98–107)
CREAT SERPL-MCNC: 0.91 MG/DL (ref 0.67–1.17)
DEPRECATED HCO3 PLAS-SCNC: 21 MMOL/L (ref 22–29)
ERYTHROCYTE [DISTWIDTH] IN BLOOD BY AUTOMATED COUNT: 19.2 % (ref 10–15)
GFR SERPL CREATININE-BSD FRML MDRD: >90 ML/MIN/1.73M2
GLUCOSE SERPL-MCNC: 102 MG/DL (ref 70–99)
HCT VFR BLD AUTO: 28.7 % (ref 40–53)
HGB BLD-MCNC: 8.7 G/DL (ref 13.3–17.7)
INR PPP: 3.59 (ref 0.85–1.15)
MCH RBC QN AUTO: 26 PG (ref 26.5–33)
MCHC RBC AUTO-ENTMCNC: 30.3 G/DL (ref 31.5–36.5)
MCV RBC AUTO: 86 FL (ref 78–100)
MDC_IDC_EPISODE_DTM: NORMAL
MDC_IDC_EPISODE_DTM: NORMAL
MDC_IDC_EPISODE_DURATION: 1 S
MDC_IDC_EPISODE_DURATION: 1 S
MDC_IDC_EPISODE_ID: 61
MDC_IDC_EPISODE_ID: 62
MDC_IDC_EPISODE_TYPE: NORMAL
MDC_IDC_EPISODE_TYPE: NORMAL
MDC_IDC_LEAD_IMPLANT_DT: NORMAL
MDC_IDC_LEAD_LOCATION: NORMAL
MDC_IDC_LEAD_LOCATION_DETAIL_1: NORMAL
MDC_IDC_LEAD_MFG: NORMAL
MDC_IDC_LEAD_MODEL: NORMAL
MDC_IDC_LEAD_POLARITY_TYPE: NORMAL
MDC_IDC_LEAD_SERIAL: NORMAL
MDC_IDC_LEAD_SPECIAL_FUNCTION: NORMAL
MDC_IDC_MSMT_BATTERY_DTM: NORMAL
MDC_IDC_MSMT_BATTERY_REMAINING_LONGEVITY: 44 MO
MDC_IDC_MSMT_BATTERY_RRT_TRIGGER: 2.73
MDC_IDC_MSMT_BATTERY_STATUS: NORMAL
MDC_IDC_MSMT_BATTERY_VOLTAGE: 2.96 V
MDC_IDC_MSMT_CAP_CHARGE_DTM: NORMAL
MDC_IDC_MSMT_CAP_CHARGE_ENERGY: 18 J
MDC_IDC_MSMT_CAP_CHARGE_TIME: 3.98
MDC_IDC_MSMT_CAP_CHARGE_TYPE: NORMAL
MDC_IDC_MSMT_LEADCHNL_RA_IMPEDANCE_VALUE: 342 OHM
MDC_IDC_MSMT_LEADCHNL_RA_PACING_THRESHOLD_AMPLITUDE: 0.5 V
MDC_IDC_MSMT_LEADCHNL_RA_PACING_THRESHOLD_PULSEWIDTH: 0.4 MS
MDC_IDC_MSMT_LEADCHNL_RA_SENSING_INTR_AMPL: 1.25 MV
MDC_IDC_MSMT_LEADCHNL_RV_IMPEDANCE_VALUE: 361 OHM
MDC_IDC_MSMT_LEADCHNL_RV_IMPEDANCE_VALUE: 456 OHM
MDC_IDC_MSMT_LEADCHNL_RV_PACING_THRESHOLD_AMPLITUDE: 0.75 V
MDC_IDC_MSMT_LEADCHNL_RV_PACING_THRESHOLD_PULSEWIDTH: 0.4 MS
MDC_IDC_MSMT_LEADCHNL_RV_SENSING_INTR_AMPL: 3.88 MV
MDC_IDC_PG_IMPLANT_DTM: NORMAL
MDC_IDC_PG_MFG: NORMAL
MDC_IDC_PG_MODEL: NORMAL
MDC_IDC_PG_SERIAL: NORMAL
MDC_IDC_PG_TYPE: NORMAL
MDC_IDC_SESS_CLINIC_NAME: NORMAL
MDC_IDC_SESS_DTM: NORMAL
MDC_IDC_SESS_TYPE: NORMAL
MDC_IDC_SET_BRADY_AT_MODE_SWITCH_RATE: 171 {BEATS}/MIN
MDC_IDC_SET_BRADY_HYSTRATE: NORMAL
MDC_IDC_SET_BRADY_LOWRATE: 50 {BEATS}/MIN
MDC_IDC_SET_BRADY_MAX_SENSOR_RATE: 115 {BEATS}/MIN
MDC_IDC_SET_BRADY_MAX_TRACKING_RATE: 130 {BEATS}/MIN
MDC_IDC_SET_BRADY_MODE: NORMAL
MDC_IDC_SET_BRADY_PAV_DELAY_LOW: 350 MS
MDC_IDC_SET_BRADY_SAV_DELAY_LOW: 350 MS
MDC_IDC_SET_LEADCHNL_RA_PACING_AMPLITUDE: 1.5 V
MDC_IDC_SET_LEADCHNL_RA_PACING_ANODE_ELECTRODE_1: NORMAL
MDC_IDC_SET_LEADCHNL_RA_PACING_ANODE_LOCATION_1: NORMAL
MDC_IDC_SET_LEADCHNL_RA_PACING_CAPTURE_MODE: NORMAL
MDC_IDC_SET_LEADCHNL_RA_PACING_CATHODE_ELECTRODE_1: NORMAL
MDC_IDC_SET_LEADCHNL_RA_PACING_CATHODE_LOCATION_1: NORMAL
MDC_IDC_SET_LEADCHNL_RA_PACING_POLARITY: NORMAL
MDC_IDC_SET_LEADCHNL_RA_PACING_PULSEWIDTH: 0.4 MS
MDC_IDC_SET_LEADCHNL_RA_SENSING_ANODE_ELECTRODE_1: NORMAL
MDC_IDC_SET_LEADCHNL_RA_SENSING_ANODE_LOCATION_1: NORMAL
MDC_IDC_SET_LEADCHNL_RA_SENSING_CATHODE_ELECTRODE_1: NORMAL
MDC_IDC_SET_LEADCHNL_RA_SENSING_CATHODE_LOCATION_1: NORMAL
MDC_IDC_SET_LEADCHNL_RA_SENSING_POLARITY: NORMAL
MDC_IDC_SET_LEADCHNL_RA_SENSING_SENSITIVITY: 0.15 MV
MDC_IDC_SET_LEADCHNL_RV_PACING_AMPLITUDE: 2 V
MDC_IDC_SET_LEADCHNL_RV_PACING_ANODE_ELECTRODE_1: NORMAL
MDC_IDC_SET_LEADCHNL_RV_PACING_ANODE_LOCATION_1: NORMAL
MDC_IDC_SET_LEADCHNL_RV_PACING_CAPTURE_MODE: NORMAL
MDC_IDC_SET_LEADCHNL_RV_PACING_CATHODE_ELECTRODE_1: NORMAL
MDC_IDC_SET_LEADCHNL_RV_PACING_CATHODE_LOCATION_1: NORMAL
MDC_IDC_SET_LEADCHNL_RV_PACING_POLARITY: NORMAL
MDC_IDC_SET_LEADCHNL_RV_PACING_PULSEWIDTH: 0.4 MS
MDC_IDC_SET_LEADCHNL_RV_SENSING_ANODE_ELECTRODE_1: NORMAL
MDC_IDC_SET_LEADCHNL_RV_SENSING_ANODE_LOCATION_1: NORMAL
MDC_IDC_SET_LEADCHNL_RV_SENSING_CATHODE_ELECTRODE_1: NORMAL
MDC_IDC_SET_LEADCHNL_RV_SENSING_CATHODE_LOCATION_1: NORMAL
MDC_IDC_SET_LEADCHNL_RV_SENSING_POLARITY: NORMAL
MDC_IDC_SET_LEADCHNL_RV_SENSING_SENSITIVITY: 0.3 MV
MDC_IDC_SET_ZONE_DETECTION_BEATS_DENOMINATOR: 40 {BEATS}
MDC_IDC_SET_ZONE_DETECTION_BEATS_NUMERATOR: 30 {BEATS}
MDC_IDC_SET_ZONE_DETECTION_INTERVAL: 280 MS
MDC_IDC_SET_ZONE_DETECTION_INTERVAL: 320 MS
MDC_IDC_SET_ZONE_DETECTION_INTERVAL: 350 MS
MDC_IDC_SET_ZONE_DETECTION_INTERVAL: 350 MS
MDC_IDC_SET_ZONE_DETECTION_INTERVAL: 400 MS
MDC_IDC_SET_ZONE_TYPE: NORMAL
MDC_IDC_STAT_AT_BURDEN_PERCENT: 0 %
MDC_IDC_STAT_AT_DTM_END: NORMAL
MDC_IDC_STAT_AT_DTM_START: NORMAL
MDC_IDC_STAT_BRADY_AP_VP_PERCENT: 0.01 %
MDC_IDC_STAT_BRADY_AP_VS_PERCENT: 0.03 %
MDC_IDC_STAT_BRADY_AS_VP_PERCENT: 0.13 %
MDC_IDC_STAT_BRADY_AS_VS_PERCENT: 99.83 %
MDC_IDC_STAT_BRADY_DTM_END: NORMAL
MDC_IDC_STAT_BRADY_DTM_START: NORMAL
MDC_IDC_STAT_BRADY_RA_PERCENT_PACED: 0.04 %
MDC_IDC_STAT_BRADY_RV_PERCENT_PACED: 0.14 %
MDC_IDC_STAT_EPISODE_RECENT_COUNT: 0
MDC_IDC_STAT_EPISODE_RECENT_COUNT: 2
MDC_IDC_STAT_EPISODE_RECENT_COUNT_DTM_END: NORMAL
MDC_IDC_STAT_EPISODE_RECENT_COUNT_DTM_START: NORMAL
MDC_IDC_STAT_EPISODE_TOTAL_COUNT: 0
MDC_IDC_STAT_EPISODE_TOTAL_COUNT: 1
MDC_IDC_STAT_EPISODE_TOTAL_COUNT: 1
MDC_IDC_STAT_EPISODE_TOTAL_COUNT: 12
MDC_IDC_STAT_EPISODE_TOTAL_COUNT: 36
MDC_IDC_STAT_EPISODE_TOTAL_COUNT_DTM_END: NORMAL
MDC_IDC_STAT_EPISODE_TOTAL_COUNT_DTM_START: NORMAL
MDC_IDC_STAT_EPISODE_TYPE: NORMAL
MDC_IDC_STAT_TACHYTHERAPY_ATP_DELIVERED_RECENT: 0
MDC_IDC_STAT_TACHYTHERAPY_ATP_DELIVERED_TOTAL: 0
MDC_IDC_STAT_TACHYTHERAPY_RECENT_DTM_END: NORMAL
MDC_IDC_STAT_TACHYTHERAPY_RECENT_DTM_START: NORMAL
MDC_IDC_STAT_TACHYTHERAPY_SHOCKS_ABORTED_RECENT: 0
MDC_IDC_STAT_TACHYTHERAPY_SHOCKS_ABORTED_TOTAL: 0
MDC_IDC_STAT_TACHYTHERAPY_SHOCKS_DELIVERED_RECENT: 0
MDC_IDC_STAT_TACHYTHERAPY_SHOCKS_DELIVERED_TOTAL: 1
MDC_IDC_STAT_TACHYTHERAPY_TOTAL_DTM_END: NORMAL
MDC_IDC_STAT_TACHYTHERAPY_TOTAL_DTM_START: NORMAL
PLATELET # BLD AUTO: 282 10E3/UL (ref 150–450)
POTASSIUM SERPL-SCNC: 4.2 MMOL/L (ref 3.4–5.3)
RBC # BLD AUTO: 3.34 10E6/UL (ref 4.4–5.9)
SODIUM SERPL-SCNC: 132 MMOL/L (ref 136–145)
WBC # BLD AUTO: 6.5 10E3/UL (ref 4–11)

## 2023-03-19 PROCEDURE — 250N000013 HC RX MED GY IP 250 OP 250 PS 637: Performed by: PHYSICIAN ASSISTANT

## 2023-03-19 PROCEDURE — 99255 IP/OBS CONSLTJ NEW/EST HI 80: CPT | Performed by: INTERNAL MEDICINE

## 2023-03-19 PROCEDURE — 80048 BASIC METABOLIC PNL TOTAL CA: CPT | Performed by: SURGERY

## 2023-03-19 PROCEDURE — 999N000128 HC STATISTIC PERIPHERAL IV START W/O US GUIDANCE

## 2023-03-19 PROCEDURE — 250N000011 HC RX IP 250 OP 636: Performed by: STUDENT IN AN ORGANIZED HEALTH CARE EDUCATION/TRAINING PROGRAM

## 2023-03-19 PROCEDURE — 250N000013 HC RX MED GY IP 250 OP 250 PS 637: Performed by: SURGERY

## 2023-03-19 PROCEDURE — 250N000013 HC RX MED GY IP 250 OP 250 PS 637: Performed by: STUDENT IN AN ORGANIZED HEALTH CARE EDUCATION/TRAINING PROGRAM

## 2023-03-19 PROCEDURE — 250N000013 HC RX MED GY IP 250 OP 250 PS 637: Performed by: THORACIC SURGERY (CARDIOTHORACIC VASCULAR SURGERY)

## 2023-03-19 PROCEDURE — 250N000011 HC RX IP 250 OP 636: Performed by: PHYSICIAN ASSISTANT

## 2023-03-19 PROCEDURE — 250N000011 HC RX IP 250 OP 636: Performed by: SURGERY

## 2023-03-19 PROCEDURE — 85027 COMPLETE CBC AUTOMATED: CPT | Performed by: SURGERY

## 2023-03-19 PROCEDURE — 214N000001 HC R&B CCU UMMC

## 2023-03-19 PROCEDURE — 36415 COLL VENOUS BLD VENIPUNCTURE: CPT | Performed by: SURGERY

## 2023-03-19 PROCEDURE — 99231 SBSQ HOSP IP/OBS SF/LOW 25: CPT | Mod: 24 | Performed by: PHYSICIAN ASSISTANT

## 2023-03-19 PROCEDURE — 85610 PROTHROMBIN TIME: CPT | Performed by: SURGERY

## 2023-03-19 RX ORDER — HYDROMORPHONE HYDROCHLORIDE 1 MG/ML
0.5 INJECTION, SOLUTION INTRAMUSCULAR; INTRAVENOUS; SUBCUTANEOUS ONCE
Status: COMPLETED | OUTPATIENT
Start: 2023-03-19 | End: 2023-03-19

## 2023-03-19 RX ORDER — WARFARIN SODIUM 2.5 MG/1
2.5 TABLET ORAL
Status: COMPLETED | OUTPATIENT
Start: 2023-03-19 | End: 2023-03-19

## 2023-03-19 RX ORDER — HYDRALAZINE HYDROCHLORIDE 25 MG/1
25 TABLET, FILM COATED ORAL 3 TIMES DAILY
Status: DISCONTINUED | OUTPATIENT
Start: 2023-03-19 | End: 2023-03-26 | Stop reason: HOSPADM

## 2023-03-19 RX ORDER — FERROUS SULFATE 325(65) MG
325 TABLET ORAL
Status: DISCONTINUED | OUTPATIENT
Start: 2023-03-19 | End: 2023-03-26 | Stop reason: HOSPADM

## 2023-03-19 RX ADMIN — GABAPENTIN 100 MG: 100 CAPSULE ORAL at 08:33

## 2023-03-19 RX ADMIN — OXYCODONE HYDROCHLORIDE 5 MG: 5 TABLET ORAL at 04:49

## 2023-03-19 RX ADMIN — HYDROMORPHONE HYDROCHLORIDE 0.2 MG: 0.2 INJECTION, SOLUTION INTRAMUSCULAR; INTRAVENOUS; SUBCUTANEOUS at 02:47

## 2023-03-19 RX ADMIN — HYDRALAZINE HYDROCHLORIDE 25 MG: 25 TABLET, FILM COATED ORAL at 21:37

## 2023-03-19 RX ADMIN — ATORVASTATIN CALCIUM 40 MG: 40 TABLET, FILM COATED ORAL at 08:32

## 2023-03-19 RX ADMIN — ACETAMINOPHEN 975 MG: 325 TABLET ORAL at 22:22

## 2023-03-19 RX ADMIN — ASPIRIN 81 MG: 81 TABLET ORAL at 08:33

## 2023-03-19 RX ADMIN — HYDROMORPHONE HYDROCHLORIDE 0.5 MG: 1 INJECTION, SOLUTION INTRAMUSCULAR; INTRAVENOUS; SUBCUTANEOUS at 13:56

## 2023-03-19 RX ADMIN — Medication 1 TABLET: at 08:32

## 2023-03-19 RX ADMIN — OXYCODONE HYDROCHLORIDE 5 MG: 5 TABLET ORAL at 08:52

## 2023-03-19 RX ADMIN — TAMSULOSIN HYDROCHLORIDE 0.4 MG: 0.4 CAPSULE ORAL at 08:32

## 2023-03-19 RX ADMIN — HYDRALAZINE HYDROCHLORIDE 25 MG: 25 TABLET, FILM COATED ORAL at 17:45

## 2023-03-19 RX ADMIN — HYDROMORPHONE HYDROCHLORIDE 0.5 MG: 1 INJECTION, SOLUTION INTRAMUSCULAR; INTRAVENOUS; SUBCUTANEOUS at 21:37

## 2023-03-19 RX ADMIN — VANCOMYCIN HYDROCHLORIDE 1000 MG: 1 INJECTION, SOLUTION INTRAVENOUS at 04:50

## 2023-03-19 RX ADMIN — GABAPENTIN 100 MG: 100 CAPSULE ORAL at 20:27

## 2023-03-19 RX ADMIN — FERROUS SULFATE TAB 325 MG (65 MG ELEMENTAL FE) 325 MG: 325 (65 FE) TAB at 11:04

## 2023-03-19 RX ADMIN — HYDRALAZINE HYDROCHLORIDE 25 MG: 25 TABLET, FILM COATED ORAL at 11:04

## 2023-03-19 RX ADMIN — DIGOXIN 125 MCG: 125 TABLET ORAL at 08:33

## 2023-03-19 RX ADMIN — FAMOTIDINE 20 MG: 20 TABLET ORAL at 08:33

## 2023-03-19 RX ADMIN — HYDROXYCHLOROQUINE SULFATE 200 MG: 200 TABLET, FILM COATED ORAL at 08:33

## 2023-03-19 RX ADMIN — DIAZEPAM 2 MG: 2 TABLET ORAL at 13:48

## 2023-03-19 RX ADMIN — GABAPENTIN 100 MG: 100 CAPSULE ORAL at 13:48

## 2023-03-19 RX ADMIN — METHYLCELLULOSE 1000 MG: 500 TABLET ORAL at 20:27

## 2023-03-19 RX ADMIN — WARFARIN SODIUM 2.5 MG: 2.5 TABLET ORAL at 17:45

## 2023-03-19 RX ADMIN — OXYCODONE HYDROCHLORIDE 5 MG: 5 TABLET ORAL at 20:37

## 2023-03-19 RX ADMIN — VANCOMYCIN HYDROCHLORIDE 1000 MG: 1 INJECTION, SOLUTION INTRAVENOUS at 17:45

## 2023-03-19 RX ADMIN — FAMOTIDINE 20 MG: 20 TABLET ORAL at 20:27

## 2023-03-19 RX ADMIN — LISINOPRIL 10 MG: 10 TABLET ORAL at 20:27

## 2023-03-19 RX ADMIN — HYDROXYCHLOROQUINE SULFATE 200 MG: 200 TABLET, FILM COATED ORAL at 20:27

## 2023-03-19 RX ADMIN — METHYLCELLULOSE 1000 MG: 500 TABLET ORAL at 08:33

## 2023-03-19 RX ADMIN — OXYCODONE HYDROCHLORIDE 5 MG: 5 TABLET ORAL at 12:39

## 2023-03-19 RX ADMIN — LISINOPRIL 10 MG: 10 TABLET ORAL at 08:33

## 2023-03-19 RX ADMIN — OXYCODONE HYDROCHLORIDE 5 MG: 5 TABLET ORAL at 00:47

## 2023-03-19 ASSESSMENT — ACTIVITIES OF DAILY LIVING (ADL)
ADLS_ACUITY_SCORE: 28
ADLS_ACUITY_SCORE: 26
ADLS_ACUITY_SCORE: 28
ADLS_ACUITY_SCORE: 26
ADLS_ACUITY_SCORE: 28
ADLS_ACUITY_SCORE: 26
ADLS_ACUITY_SCORE: 28

## 2023-03-19 NOTE — PROGRESS NOTES
Cardiovascular Surgery Progress Note  03/19/2023         Assessment and Plan:     Dandy Sands is a 62 year old male with a PMH of SLE, antiphospholipid syndrome, CAD, CHF 2/2 ICM s/p LVAD 7/8/22, history of C.diff who presents from clinic for management of chronic driveline infection. S/p I&D of driveline 3/18 with Dr. Zamora.      Cardiovascular:   S/p LVAD placement (HeartMate 3) on 7/8/22   Advanced ischemic cardiomyopathy, class IV, stage D  CAD s/p PCI to LAD (2005)  S/p CRT-D (2006)  History of MI (2007)  Severe MR  Antiphospholipid antibody syndrome on chronic warfarin  HTN, HLD  No arrhythmias overnight, HD stable, in NSR.  Most recent echo showed LV EF 15-20%  - PTA ASA 81 mg daily  - PTA Atorvastatin 40 mg daily  - PTA digoxin 125mcg  - PTA lisinopril 10 mg QD  - PTA Hydralazine 25 mg TID      Pulmonary:  Saturating well on RA  - Supplemental O2 PRN to keep sats > 92%. Wean off as tolerated.  - Pulm hygiene, IS, activity and deep breathing    Neurology / MSK:  Acute post-operative pain   Pain well controlled with current regimen:  - Acetaminophen     / Renal / Fluid / Electrolytes:  BL creat ~ 0.7, most recent creatinine 0.78; adequate UOP.   FLUID STATUS; I/O past 24 hours: -1.2L  - Diuresis not indicated, PTA 20 mg lasix prn   - Replete lytes per protocol  - Strict I/O, daily weights  - Avoid/limit nephrotoxins as able    GI / Nutrition:   - Currently NPO for I&D today  - Continue bowel regimen    Endocrine:  Stress induced hyperglycemia   Hgb A1c 5.5    Infectious Disease:  WBC 6.5, remains afebrile, no signs or symptoms of infection  - Completed perioperative antibiotics  - Continue to monitor fever curve, CBC  ID consulted after OR: plan to continue vancomycin     Hematology:   Hgb 8.7; Plt 282, no signs or symptoms of active bleeding  - CBC daily    Anticoagulation:   - ASA 81 mg daiy  - Coumadin for LVAD, INR goal 2-3. Most recent INR 3.59.      MSK/Skin:  Sternal wound I&D site  LVAD  Driveline exit site  - Driveline dressing changes per protocol  - chest wound I&D site: BID dressing changes with sterile packing. Plan to VAC tomorrow if wound is hemostatic    Prophylaxis:   - Stress ulcer prophylaxis: Pantoprazole 40 mg daily for 30 days  - DVT prophylaxis: Subcutaneous heparin, SCD      Discussed with Dr. Noah Zabala PA-C   Cardiothoracic Surgery   Pager #[phone]  March 19, 2023 at 9:10 AM          Interval History:     I&D yesterday. Tolerated well. No bleeding overnight. Moderate pain. Controlled with pain regimen. Wound hemostatic. Mild purulence.   Denies chest pain, palpitations, dizziness, syncopal symptoms, fevers, chills, myalgias, or sternal popping/clicking.         Physical Exam:     Gen: A&Ox4, NAD  Neuro: no focal deficits   CV: RRR, normal S1 S2, no murmurs, rubs or gallops  Pulm: CTA, no wheezing or rhonchi, normal breathing on RA  Abd: nondistended, normal BS, soft, nontender  Ext: no peripheral edema  Incision: clean, dry, intact,   Chest wound with open packing, hemostatic, mild purulence  Lines: driveline dressing clean  LVAD: speed 5100, flow 3.9, PI 4.4, power 3.6.          Data:    Imaging:  reviewed recent imaging, no acute concerns    No results found for this or any previous visit (from the past 24 hour(s)).     Labs:  Recent Labs   Lab 03/19/23  0737 03/18/23  1348 03/18/23  0706 03/17/23  1405   WBC 6.5 6.2  --  7.9   HGB 8.7* 8.2*  --  7.8*   MCV 86 85  --  85    281  --  277   INR 3.59*  --  2.99* 2.66*   * 134*  --  132*   POTASSIUM 4.2 4.5  --  4.3   CHLORIDE 100 104  --  101   CO2 21* 19*  --  19*   BUN 20.2 20.3  --  23.2*   CR 0.91 0.76  --  0.78   ANIONGAP 11 11  --  12   ELDA 9.0 9.6  --  10.3*   * 79  --  84   ALBUMIN  --   --   --  3.2*   PROTTOTAL  --   --   --  7.5   BILITOTAL  --   --   --  0.5   ALKPHOS  --   --   --  192*   ALT  --   --   --  15   AST  --   --   --  22      GLUCOSE:   Recent Labs   Lab 03/19/23  0737  03/18/23  1348 03/17/23  1405 03/17/23  1355   * 79 84 82

## 2023-03-19 NOTE — PROGRESS NOTES
Care Management Follow Up    Length of Stay (days): 2    Expected Discharge Date: 3/22/2023   Concerns to be Addressed: discharge planning         Anticipated Discharge Disposition: Home Infusion      Additional Information:  RNCC spoke with CVTS re: discharge plan. Will need to determine wound plan, final cultures result and ID recs prior to patient discharge. He has been accepted to [facility].     RNCC will continue to follow for final antibiotic plan.     [facility]  [phone]      Jessica Swain RN         English

## 2023-03-19 NOTE — PROGRESS NOTES
VAD Coordinator in OR throughout duration of I &D surgery. VAD parameters were monitored in collaboration with anesthesia. Report given to Vandana LONDON upon leaving the OR.       VAD parameters at START of surgery:  o Speed: 5100 rpm  o Flow: 3.7 lpm  o Power: 3.7 persaud  o PI (or flow waveform peak/trough for HW): 5    VAD parameters at END of surgery:  o Speed: 5100 rpm  o Flow: 3.7 lpm  o Power: 3.7 persaud  o PI (or flow waveform peak/trough for HW): 5.4    Speed adjustments made during OR: None     Flow range: 3.5- 3.9lpm    PI (or flow waveform peak/trough for HW) range: 4.5-6.3    Factors noted to cause a significant variation in VAD parameters: NA    Other significant events: NA    Please page the VAD Coordinator on-call with any VAD related questions (* * * [phone], job code [phone] from an internal line).     Alia Littlejohn RN

## 2023-03-19 NOTE — ANESTHESIA CARE TRANSFER NOTE
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    Anesthesia Type:   MAC     Note:    Oropharynx: spontaneously breathing  Level of Consciousness: awake  Oxygen Supplementation: nasal cannula  Level of Supplemental Oxygen (L/min / FiO2): 2  Independent Airway: airway patency satisfactory and stable  Dentition: dentition unchanged  Vital Signs Stable: post-procedure vital signs reviewed and stable  Report to RN Given: handoff report given  Patient transferred to: PACU  Comments: Patient awake. Airway patent. LVAD parameters WNL. LVAD coodinator at bedside. NIBP MAP 82. Sp02 intermittently picking up-- difficult with nonpulsatile flow.  Handoff Report: Identifed the Patient, Identified the Reponsible Provider, Reviewed the pertinent medical history, Discussed the surgical course, Reviewed Intra-OP anesthesia mangement and issues during anesthesia, Set expectations for post-procedure period and Allowed opportunity for questions and acknowledgement of understanding      Vitals:  Vitals Value Taken Time   BP 89/52 03/18/23 1919   Temp     Pulse 87 03/18/23 1921   Resp     SpO2 83 % 03/18/23 1921   Vitals shown include unvalidated device data.    Electronically Signed By: YAO Laboy CRNA  March 18, 2023  7:22 PM

## 2023-03-19 NOTE — BRIEF OP NOTE
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 (H) [T82.7XXA]  Post-operative diagnosis Same as pre-operative diagnosis    Procedure: Procedure(s):  Incision and drainage of driveline  Surgeon: Surgeon(s) and Role:     * Darius Zamora MD - Primary  Anesthesia: General   Estimated Blood Loss: Less than 50 ml    Drains: None  Specimens:   ID Type Source Tests Collected by Time Destination   A : Sternal wound Abscess Chest AFB CULTURE AND STAIN NON BLOOD (Canceled), ANAEROBIC BACTERIAL CULTURE ROUTINE, GRAM STAIN, FUNGAL OR YEAST CULTURE ROUTINE, AEROBIC BACTERIAL CULTURE ROUTINE Darius Zamora MD 3/18/2023  7:00 PM      Findings:   see op note.  Complications: None.  Implants: * No implants in log *    Signed:    Miguel Iglesias MD 3/18/2023 at 8:12 PM  Cardiothoracic Surgery Fellow  Pager: [phone]

## 2023-03-19 NOTE — PLAN OF CARE
D: Pt admitted for wound debridement 3/18. Pt stable and comfortable. AVSS, LVAD #s WNL. Incisional pain adequately controlled with oxycodone. IV dilaudid x 1 premedication for dressing change/packing. No shortness of breath noted. IV Abx given as ordered.   I: Monitored/assessed pt. Assisted with cares.  A: Pt stable and comfortable.  P: Continue to monitor/assess pt, contact provider with concerns. Possible wound vac placement 3/20. Plan to DC on home infusion.

## 2023-03-19 NOTE — PLAN OF CARE
Pt admitted  3/17 for scheduled I&D of chest wound. Rescheduled for today. . PMH includes SLE, antiphospholipid syndrome, CAD, CHF 2/2 ICM s/p HM3 (7/2022)., and chronic driveline infections.      Neuro: A&O x 4. Afebrile. Difficulty sleeping overnight.   Cardiac: 1st degree AV/BBB 60s to 100s. MAPs 60s to 90s. Denies dizziness, chest pain, or palpitations. LVAD #s WNL. No alarms.   Respiratory: Sating well on RA. LS clear. Denies cough.  GI/: Good UOP via bedside urinal. LBM PTA. Denies nausea.   Diet/Appetite: Regular  Skin: LVAD dressing CDI. Chest wound dressing CDI.   LDA: R PIV SL  Activity: SBA  Pain: Incisional pain. PRN Dilaudid and Oxycodone given.      Plan: Continue to monitor and alert team with any changes or concerns.

## 2023-03-19 NOTE — ANESTHESIA POSTPROCEDURE EVALUATION
Patient: Dandy Sands    Procedure: Procedure(s):  Incision and drainage of driveline       Anesthesia Type:  MAC    Note:  Disposition: Inpatient   Postop Pain Control: Uneventful            Sign Out: Well controlled pain   PONV: No   Neuro/Psych: Uneventful            Sign Out: Acceptable/Baseline neuro status   Airway/Respiratory: Uneventful            Sign Out: Acceptable/Baseline resp. status; O2 supplementation               Oxygen: Nasal Cannula   CV/Hemodynamics: Uneventful            Sign Out: Acceptable CV status; No obvious hypovolemia; No obvious fluid overload   Other NRE: NONE   DID A NON-ROUTINE EVENT OCCUR? No           Last vitals:  Vitals Value Taken Time   /40 03/18/23 2030   Temp 36.4  C (97.6  F) 03/18/23 1920   Pulse 88 03/18/23 2038   Resp 18 03/18/23 1920   SpO2 88 % 03/18/23 2039   Vitals shown include unvalidated device data.    Electronically Signed By: Joseph Love MD  March 18, 2023  8:42 PM

## 2023-03-19 NOTE — CONSULTS
GENERAL INFECTIOUS DISEASES CONSULTATION     Patient:  Dandy Sands   Date of birth 1960, Medical record number [id number]  Date of Visit:  03/19/2023  Date of Admission: 3/17/2023  Consult Requester:Darius Zamora, *          Assessment and Recommendations:   ASSESSMENT:  1. 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 Zamora). Patient has previously been treated with daptomycin (10/2022, 12/2022) and more recently has been on minocycline for suppression of Corynebacterium driveline infection. Developed redness, pain, and swelling concerning for abscess (see clinic photo from ID clinic 3/16 in media tab). Wound cultures collected from driveline drainage in clinic 3/16. Now s/p I&D on 3/18 with cultures sent from OR. Blood culture x1 NGTD. Doing well following I&D. Afebrile, no signs of systemic infection. Recommend continuing vancomycin while awaiting culture data.       RECOMMENDATION:  1. Continue vancomycin, appreciate pharmacy dosing  2. Following wound cultures (3/16) and OR cultures (3/19)  3. ID will continue to follow to assist with abx plan  4. Patient will need to decide if he would like to follow up with [facility] or [facility] (Dr. Steve Saravia)    Thank you for this consult. ID will continue to follow.     Patient was discussed with Dr. Lees.     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 Zamora).    Brief history of driveline infection per ID note 3/16/23: \"Hospitalized 10/27/22 - 11/3/22 with a driveline infection due to Corynebacterium striatum. He was treated with Vancomycin inpatient and ultimately transitioned to Daptomycin for ease of dosing for 2-4 weeks.  He was subsequently placed on minocycline for suppression.  He developed increased drainage in 12/2022 and received another course of IV Daptomycin for 2 weeks.  He was hospitalized 1/21 - 1/24 for dizziness and transitioned back to PO minocycline by infectious disease.\"    Dandy reports that he has been taking minocycline as directed and that drainage has been well controlled. Children help with dressing changes. He presented to ID clinic on 3/16 with a golf ball-sized erythematous, painful nodule in epigastric/rib area. Symptoms began after he injured the area lifting a box a few days prior to presentation in ID clinic on 3/16. Feeling well post-I&D, incision covered with dressing. Notes night time sweats, states these have been going on for some time. He has not had any fever, chills, nausea, vomiting, other skin concerns, shortness of breath, worsening edema.          LVAD History:  Current LVAD model: Heartmate III  Date current LVAD placed: 7/8/22  Previous LVAD devices: none  Other prosthetic devices/materials:  none  Primary cardiologist: Kelly Lora MD  Primary LVAD coordinator: Chantal Brasher RN  Primary ID " provider: [facility] (Aishwarya Wolff, Phuong, and Zoltan)  - Has also been following with Dr. Steve Saravia, ID provider with [facility]     History of bacteremias (dates and organisms):  -Actinomyces 11/6/22      History of driveline infections (dates and organisms):   -10/2022 Corynebacterium striatum     History of other pertinent infections:   -Hx C.diff 2022     History of driveline area irritation and current mitigation strategies:  -Currently performing daily dressing changes     Current suppressive antibiotics:   -Minocycline     Previous antibiotic failures/allergies/intolerances etc:  -muscle pain and elevated CK on daptomycin     Transplant Plan:    -Transplant evaluation           Past Medical History:     Past Medical History:   Diagnosis Date     Antiphospholipid antibody syndrome (H)      CAD (coronary artery disease)      Chronic systolic heart failure (H)      Ischemic cardiomyopathy      Mitral regurgitation      Systemic lupus erythematosus (H)             Past Surgical History:     Past Surgical History:   Procedure Laterality Date     COLONOSCOPY N/A 05/23/2022    Procedure: COLONOSCOPY;  Surgeon: Parth Meneses MD;  [facility]     CV CORONARY ANGIOGRAM N/A 05/24/2022    Procedure: Coronary Angiogram;  Surgeon: Mike Pope MD;  [facility]     CV CORONARY ANGIOGRAM N/A 05/29/2022    Procedure: Coronary Angiogram;  Surgeon: Austin Bright MD;  [facility]     CV CORONARY LITHOTRIPSY PCI Bilateral 05/24/2022    Procedure: Percutaneous Coronary Intervention - Lithotripsy;  Surgeon: Mike Pope MD;  [facility]     CV INTRA AORTIC BALLOON N/A 06/17/2022    Procedure: Intraprocedure Aortic Balloon Pump Insertion;  Surgeon: Sherman Cerda MD;  [facility]     CV INTRA AORTIC BALLOON Right 07/03/2022    Procedure: Reposition of Subclavian Intraprocedure  Aortic Balloon Pump;  Surgeon: Austin Bright MD;  Location:  [facility]ATH [facility]     CV INTRAVASULAR ULTRASOUND N/A 05/24/2022    Procedure: Intravascular Ultrasound;  Surgeon: Mike Pope MD;  Location:  [facility]     CV PCI ANGIOPLASTY N/A 05/29/2022    Procedure: Percutaneous Transluminal Angioplasty;  Surgeon: Austin Bright MD;  Location:  [facility]     CV PCI STENT DRUG ELUTING N/A 05/24/2022    Procedure: Percutaneous Coronary Intervention Stent;  Surgeon: Mike Pope MD;  Location:  [facility]ATH [facility]     CV RIGHT HEART CATH MEASUREMENTS RECORDED N/A 05/18/2022    Procedure: Right Heart Catheterization;  Surgeon: Justo Stewart MD;  Location:  [facility]ARD[facility] CATH [facility]     CV RIGHT HEART CATH MEASUREMENTS RECORDED N/A 05/24/2022    Procedure: Right Heart Catheterization;  Surgeon: Mike Pope MD;  Location:  [facility] CATH [facility]     CV RIGHT HEART CATH MEASUREMENTS RECORDED N/A 05/29/2022    Procedure: Right Heart Catheterization;  Surgeon: Austin Bright MD;  Location:  [facility]ARD[facility] CATH [facility]     CV RIGHT HEART CATH MEASUREMENTS RECORDED N/A 07/01/2022    Procedure: Right Heart Catheterization;  Surgeon: Mike Pope MD;  Location:  [facility]ARD[facility] CATH LAB     CV RIGHT HEART CATH MEASUREMENTS RECORDED N/A 09/23/2022    Procedure: Right Heart Catheterization;  Surgeon: Justo Stewart MD;  Location:  [facility] CARD[facility] CATH LAB     CV RIGHT HEART EXERCISE STRESS STUDY N/A 05/18/2022    Procedure: Stress Drug Study;  Surgeon: Justo Stewart MD;  Location:  HE[facility] CARDIAC CATH LAB     CV SWAN CHOCO PROCEDURE N/A 06/17/2022    Procedure: Mcnary Choco Procedure;  Surgeon: Sherman Cerda MD;  Location:  HE[facility] CARDIAC CATH LAB     EP PACEMAKER OR ICD LEAD IMPLANT-ONE LEAD N/A 1/13/2023    Procedure: CRT-D Lead revision;  Surgeon: Beckie Maurice MD;  Location:  [facility] CARD[facility] CATH LAB     INCISION AND DRAINAGE CHEST WASHOUT,  COMBINED N/A 07/11/2022    Procedure: Chest washout and closure;  Surgeon: Darnell Morgan MD;  [facility]     INCISION AND DRAINAGE CHEST WASHOUT, COMBINED N/A 07/12/2022    Procedure: INCISION AND DRAINAGE, WOUND, CHEST, WITH IRRIGATION;  Surgeon: Darius Zamora MD;  [facility]: [facility]     INJECT NERVE OTHER PERIPHERAL Left 1/16/2023    Procedure: Cryoablation of intercostal nerves;  Surgeon: Kenroy Nguyen MD;  [facility]: [facility]     INSERT ARTERIAL LINE  07/18/2022          INSERT INTRAAORTIC BALLOON PUMP Right 06/23/2022    Procedure: INSERTION, INTRA-AORTIC BALLOON PUMP RIGHT SUBCLAVIAN ARTERY.;  Surgeon: Hayden Veras MD;  [facility]: [facility]     INSERT VENTRICULAR ASSIST DEVICE LEFT (HEARTMATE II/III) MINIMALLY INVASIVE N/A 07/08/2022    Procedure: MEDIAN STERNOTOMY.  CARDIOPULMONARY BYPASS.  INTRAOPERATIVE TRANSESOPHAGEAL ECHOCARDIOGRAM.  IMPLANT LEFT VENTRICULAR ASSIST DEVICE HEARTMATE III.  PLACEMENT OF PERCUTANEOUS RIGHT VENTRICULAR ASSIST DEVICE.  TEMPORARY CHEST CLOSURE;  Surgeon: Darius Zamora MD;  Location: [facility]     IR CHEST TUBE PLACEMENT NON-TUNNELED RIGHT  08/01/2022     IRRIGATION AND DEBRIDEMENT CHEST WASHOUT, COMBINED N/A 07/13/2022    Procedure: IRRIGATION AND DEBRIDEMENT, CHEST;  Surgeon: Darius Zamora MD;  [facility]: [facility]     MIDLINE DOUBLE LUMEN PLACEMENT Left 09/11/2022    Mid line ok to use     MIDLINE DOUBLE LUMEN PLACEMENT Right 09/14/2022    5FR DL midline.     PICC DOUBLE LUMEN PLACEMENT Right 05/28/2022    right basilic 5 fr dl picc 40 cm     PICC DOUBLE LUMEN PLACEMENT Right 08/15/2022    Right Lateral, 41 total length, 3 cm external length     PICC SINGLE LUMEN PLACEMENT Right 11/01/2022    41cm (2cm external), Lateral brachial vein, low SVC            Family History:   Reviewed and non-contributory.   History reviewed. No pertinent family history.         Social History:     Social History     Tobacco Use     Smoking status: Former     Smokeless  tobacco: Never   Substance Use Topics     Alcohol use: Not on file     History   Sexual Activity     Sexual activity: Not on file            Current Medications:       aspirin  81 mg Oral Daily     atorvastatin  40 mg Oral Daily     digoxin  125 mcg Oral Daily     famotidine  20 mg Oral BID     ferrous sulfate  325 mg Oral Daily with breakfast     gabapentin  100 mg Oral TID     hydrALAZINE  25 mg Oral TID     hydroxychloroquine  200 mg Oral BID     lidocaine  1 patch Transdermal Q24h    And     lidocaine   Transdermal Q8H CAMMY     lisinopril  10 mg Oral BID     methylcellulose  1,000 mg Oral BID     multivitamin w/minerals  1 tablet Oral Daily     sodium chloride (PF)  3 mL Intracatheter Q8H     tamsulosin  0.4 mg Oral Daily     vancomycin  1,000 mg Intravenous Q12H     warfarin ANTICOAGULANT  2.5 mg Oral ONCE at 18:00     Warfarin Therapy Reminder  1 each Oral See Admin Instructions            Allergies:   No Known Allergies         Physical Exam:   Vitals were reviewed  Patient Vitals for the past 24 hrs:   BP Temp Temp src Pulse Resp SpO2 Weight   03/19/23 0817 (!) 72/61 98  F (36.7  C) Oral 80 17 98 % --   03/19/23 0600 -- -- -- -- -- -- 70.6 kg (155 lb 10.3 oz)   03/19/23 0500 (!) 86/71 97.8  F (36.6  C) Oral 96 18 98 % --   03/19/23 0245 -- -- -- 94 -- -- --   03/18/23 2308 (!) 89/61 97.5  F (36.4  C) Oral 63 18 99 % --   03/18/23 2205 117/52 -- -- 79 18 -- --   03/18/23 2145 (!) 89/72 -- -- 103 16 -- --   03/18/23 2100 (!) 84/46 97.7  F (36.5  C) Oral 70 16 -- --   03/18/23 2030 100/40 -- -- 87 18 -- --   03/18/23 2010 (!) 74/61 -- -- 82 18 90 % --   03/18/23 2000 96/73 -- -- 87 18 94 % --   03/18/23 1950 95/81 -- -- 86 -- -- --   03/18/23 1940 (!) 78/54 -- -- 86 -- 92 % --   03/18/23 1920 (!) 89/52 97.6  F (36.4  C) Oral 91 18 (!) 82 % --   03/18/23 1535 (!) 70/56 97.7  F (36.5  C) Oral 82 18 -- --   03/18/23 1104 (!) 84/50 97.7  F (36.5  C) Oral 78 18 91 % --       Physical Examination:  GENERAL:  Awake,  alert, oriented, well-developed, in bed in no acute distress.   HEENT:  Head is normocephalic, atraumatic   EYES:  Eyes have anicteric sclerae without conjunctival injection.    ENT:  Oropharynx is moist without exudates or ulcers.   LUNGS:  Normal respiratory effort on RA.   CARDIOVASCULAR:  No peripheral edema.  ABDOMEN:  Soft, nondistended. LVAD in place, driveline with dressing intact. Dressing in place over I&D site.  SKIN:  Warm, non-diaphoretic. No acute rashes.  Line(s) are in place without any surrounding erythema or exudate. No stigmata of endocarditis.  NEUROLOGIC:  Grossly nonfocal. Active x4 extremities         Laboratory Data:     Inflammatory Markers    Recent Labs   Lab Test 06/19/22  1522   SED 39*   CRP 28.0*       Hematology Studies    Recent Labs   Lab Test 03/19/23  0737 03/18/23  1348 03/17/23  1405 01/13/23  0744 01/12/23  0822 12/12/22  0854 07/20/22  1523 07/20/22  0949 07/20/22  0355 07/19/22  2049 07/19/22  1601 07/19/22  0946 07/19/22  0322 07/18/22  2118   WBC 6.5 6.2 7.9 7.1 6.7 7.8   < > 15.9* 13.9* 22.4*   < > 18.5* 15.5* 19.0*   ANEU  --   --   --   --   --   --   --  14.6* 11.8* 19.5*  --  16.3* 12.2* 17.5*   AEOS  --   --   --   --   --   --   --  0.5 0.1 0.4  --  0.2 0.3 0.4   HGB 8.7* 8.2* 7.8* 8.9* 8.5* 10.6*   < > 8.5* 8.1* 8.2*   < > 8.3* 7.0* 7.6*   MCV 86 85 85 89 86 92   < > 90 90 90   < > 89 92 91    281 277 409 419 249   < > 150 157 200   < > 164 155 179    < > = values in this interval not displayed.       Metabolic Studies     Recent Labs   Lab Test 03/19/23  0737 03/18/23  1348 03/17/23  1405 01/13/23  0744 01/12/23  0822   * 134* 132* 137 136   POTASSIUM 4.2 4.5 4.3 4.3 4.3   CHLORIDE 100 104 101 102 102   CO2 21* 19* 19* 21* 24   BUN 20.2 20.3 23.2* 15.3 16.4   CR 0.91 0.76 0.78 0.85 0.80   GFRESTIMATED >90 >90 >90 >90 >90       Hepatic Studies    Recent Labs   Lab Test 03/17/23  1405 01/12/23  0822 12/12/22  0854 11/03/22  0522 11/02/22  0611  11/01/22  0754   BILITOTAL 0.5 0.4 0.7 0.3 0.3 0.3   ALKPHOS 192* 183* 119 131* 117 118   ALBUMIN 3.2* 3.8 3.8 3.3* 2.9* 3.1*   AST 22 21 19 20 20 18   ALT 15 14 12 12 9* 8*       Microbiology:  Culture   Date Value Ref Range Status   03/18/2023 No growth after 12 hours  Preliminary   03/16/2023 No growth after 2 days  Preliminary   01/12/2023 No Growth  Final   01/12/2023 No Growth  Final   01/11/2023 No Growth  Final   01/11/2023 No anaerobic organisms isolated  Final   12/12/2022 No Growth  Final   12/12/2022 No Growth  Final   12/12/2022 4+ Corynebacterium striatum (A)  Final     Comment:     Identification obtained by MALDI-TOF mass spectrometry research use only database. Test characteristics determined and verified by the [facility].Susceptibilities not routinely done   12/12/2022 4+ Corynebacterium striatum (A)  Final     Comment:     Identification obtained by MALDI-TOF mass spectrometry research use only database. Test characteristics determined and verified by the [facility] Diseases Diagnostic Laboratory.Susceptibilities not routinely done   12/12/2022 No anaerobic organisms isolated  Final   10/28/2022 2+ Corynebacterium striatum (A)  Final     Comment:     Identification obtained by MALDI-TOF mass spectrometry research use only database. Test characteristics determined and verified by the [facility] Diseases Diagnostic Laboratory.Susceptibilities not routinely done   10/28/2022 10,000-50,000 CFU/mL Proteus mirabilis (A)  Final   10/27/2022 No Growth  Final   10/27/2022 No Growth  Final   10/26/2022 No Growth  Final   10/26/2022 No Growth  Final   10/26/2022 No anaerobic organisms isolated  Final   10/26/2022 1+ Corynebacterium striatum (A)  Final     Comment:     Identification obtained by MALDI-TOF mass spectrometry research use only database. Test characteristics determined and verified by the Infect[facility] Diseases Diagnostic Laboratory.Susceptibilities not routinely done    08/04/2022 No Growth  Final   08/04/2022 No Growth  Final   08/03/2022 No Growth  Final   08/03/2022 No Growth  Final   08/03/2022 No Growth  Final   08/02/2022 No Growth  Final   08/02/2022 Positive on the 1st day of incubation (A)  Final   08/02/2022 Staphylococcus epidermidis (AA)  Final     Comment:     1 of 2 bottles   08/02/2022 Positive on the 1st day of incubation (A)  Final   08/02/2022 Enterococcus faecalis (AA)  Final     Comment:     1 of 2 bottles   08/01/2022 No Growth  Final   07/31/2022 No Growth  Final   07/30/2022 No Growth  Final   07/29/2022 No Growth  Final   07/28/2022 No Growth  Final   07/28/2022 No anaerobic organisms isolated  Final   07/28/2022 No Growth  Final   07/27/2022 No Growth  Final   07/25/2022 No Growth  Final   07/25/2022 No Growth  Final   07/25/2022 No Growth  Final   07/24/2022 No Growth  Final   07/23/2022 No Growth  Final   07/22/2022 No Growth  Final   07/21/2022 No Growth  Final   07/21/2022 No Growth  Final   07/21/2022 No Growth  Final   07/20/2022 No Growth  Final   07/19/2022 No Growth  Final   07/18/2022 No Growth  Final   07/17/2022 No Growth  Final   07/16/2022 No Growth  Final   07/15/2022 No Growth  Final   07/13/2022 No Growth  Final   07/13/2022 No Growth  Final   07/13/2022 No Growth  Final   07/12/2022 No Growth  Final   07/12/2022 4+ Candida albicans (A)  Final     Comment:     Susceptibilities not routinely done   07/12/2022 1+ Escherichia coli (A)  Final   07/12/2022 3+ Enterococcus faecalis (A)  Final   07/12/2022 No Growth  Final   07/12/2022 No Growth  Final   07/11/2022 No Growth  Final   07/10/2022 No Growth  Final   07/09/2022 No Growth  Final   05/19/2022 No Growth  Final       Urine Studies    Recent Labs   Lab Test 10/28/22  0926 09/19/22  0023 09/10/22  1222 08/31/22  1542 08/03/22  1110 07/21/22  2239   LEUKEST Negative Negative Negative Negative Negative Negative   WBCU 1 <1  --  4 21* 1       Vancomycin Levels    Recent Labs   Lab Test  01/12/23  0822 11/03/22  0522 11/01/22  0754   VANCOMYCIN <4.0 14.4 15.1       Hepatitis B Testing   Recent Labs   Lab Test 05/20/22  0516   HBCAB Nonreactive   HEPBANG Nonreactive     Hepatitis C Testing     Hepatitis C Antibody   Date Value Ref Range Status   05/20/2022 Nonreactive Nonreactive Final     Respiratory Virus Testing    No results found for: RS, FLUAG          Imaging:   No new imaging

## 2023-03-20 LAB
ANION GAP SERPL CALCULATED.3IONS-SCNC: 15 MMOL/L (ref 7–15)
BUN SERPL-MCNC: 19.7 MG/DL (ref 8–23)
CALCIUM SERPL-MCNC: 9 MG/DL (ref 8.8–10.2)
CHLORIDE SERPL-SCNC: 98 MMOL/L (ref 98–107)
CREAT SERPL-MCNC: 0.88 MG/DL (ref 0.67–1.17)
DEPRECATED HCO3 PLAS-SCNC: 20 MMOL/L (ref 22–29)
ERYTHROCYTE [DISTWIDTH] IN BLOOD BY AUTOMATED COUNT: 19.2 % (ref 10–15)
GFR SERPL CREATININE-BSD FRML MDRD: >90 ML/MIN/1.73M2
GLUCOSE SERPL-MCNC: 98 MG/DL (ref 70–99)
HCT VFR BLD AUTO: 28.8 % (ref 40–53)
HGB BLD-MCNC: 8.6 G/DL (ref 13.3–17.7)
INR PPP: 3.46 (ref 0.85–1.15)
MCH RBC QN AUTO: 25.7 PG (ref 26.5–33)
MCHC RBC AUTO-ENTMCNC: 29.9 G/DL (ref 31.5–36.5)
MCV RBC AUTO: 86 FL (ref 78–100)
PLATELET # BLD AUTO: 297 10E3/UL (ref 150–450)
POTASSIUM SERPL-SCNC: 4.1 MMOL/L (ref 3.4–5.3)
RBC # BLD AUTO: 3.35 10E6/UL (ref 4.4–5.9)
SODIUM SERPL-SCNC: 133 MMOL/L (ref 136–145)
VANCOMYCIN SERPL-MCNC: 18.4 UG/ML
VANCOMYCIN SERPL-MCNC: 21.3 UG/ML
WBC # BLD AUTO: 5.5 10E3/UL (ref 4–11)

## 2023-03-20 PROCEDURE — 99232 SBSQ HOSP IP/OBS MODERATE 35: CPT | Performed by: INTERNAL MEDICINE

## 2023-03-20 PROCEDURE — 250N000011 HC RX IP 250 OP 636: Performed by: SURGERY

## 2023-03-20 PROCEDURE — 80202 ASSAY OF VANCOMYCIN: CPT | Performed by: THORACIC SURGERY (CARDIOTHORACIC VASCULAR SURGERY)

## 2023-03-20 PROCEDURE — 250N000011 HC RX IP 250 OP 636: Performed by: PHYSICIAN ASSISTANT

## 2023-03-20 PROCEDURE — 250N000013 HC RX MED GY IP 250 OP 250 PS 637: Performed by: STUDENT IN AN ORGANIZED HEALTH CARE EDUCATION/TRAINING PROGRAM

## 2023-03-20 PROCEDURE — 93750 INTERROGATION VAD IN PERSON: CPT

## 2023-03-20 PROCEDURE — 80048 BASIC METABOLIC PNL TOTAL CA: CPT | Performed by: SURGERY

## 2023-03-20 PROCEDURE — 250N000013 HC RX MED GY IP 250 OP 250 PS 637: Performed by: THORACIC SURGERY (CARDIOTHORACIC VASCULAR SURGERY)

## 2023-03-20 PROCEDURE — 36415 COLL VENOUS BLD VENIPUNCTURE: CPT | Performed by: SURGERY

## 2023-03-20 PROCEDURE — 85610 PROTHROMBIN TIME: CPT | Performed by: SURGERY

## 2023-03-20 PROCEDURE — 250N000013 HC RX MED GY IP 250 OP 250 PS 637: Performed by: PHYSICIAN ASSISTANT

## 2023-03-20 PROCEDURE — 250N000011 HC RX IP 250 OP 636: Performed by: STUDENT IN AN ORGANIZED HEALTH CARE EDUCATION/TRAINING PROGRAM

## 2023-03-20 PROCEDURE — 250N000013 HC RX MED GY IP 250 OP 250 PS 637: Performed by: SURGERY

## 2023-03-20 PROCEDURE — 99231 SBSQ HOSP IP/OBS SF/LOW 25: CPT | Mod: 24 | Performed by: PHYSICIAN ASSISTANT

## 2023-03-20 PROCEDURE — 85027 COMPLETE CBC AUTOMATED: CPT | Performed by: SURGERY

## 2023-03-20 PROCEDURE — 36415 COLL VENOUS BLD VENIPUNCTURE: CPT | Performed by: THORACIC SURGERY (CARDIOTHORACIC VASCULAR SURGERY)

## 2023-03-20 PROCEDURE — 214N000001 HC R&B CCU UMMC

## 2023-03-20 RX ORDER — HYDROMORPHONE HYDROCHLORIDE 1 MG/ML
0.5 INJECTION, SOLUTION INTRAMUSCULAR; INTRAVENOUS; SUBCUTANEOUS 2 TIMES DAILY PRN
Status: DISCONTINUED | OUTPATIENT
Start: 2023-03-20 | End: 2023-03-23

## 2023-03-20 RX ORDER — HYDROMORPHONE HYDROCHLORIDE 1 MG/ML
0.5 INJECTION, SOLUTION INTRAMUSCULAR; INTRAVENOUS; SUBCUTANEOUS ONCE
Status: COMPLETED | OUTPATIENT
Start: 2023-03-20 | End: 2023-03-20

## 2023-03-20 RX ORDER — WARFARIN SODIUM 2.5 MG/1
2.5 TABLET ORAL
Status: COMPLETED | OUTPATIENT
Start: 2023-03-20 | End: 2023-03-20

## 2023-03-20 RX ORDER — VANCOMYCIN HYDROCHLORIDE 1 G/200ML
1000 INJECTION, SOLUTION INTRAVENOUS
Status: DISCONTINUED | OUTPATIENT
Start: 2023-03-20 | End: 2023-03-20

## 2023-03-20 RX ADMIN — GABAPENTIN 100 MG: 100 CAPSULE ORAL at 13:13

## 2023-03-20 RX ADMIN — HYDROMORPHONE HYDROCHLORIDE 0.2 MG: 0.2 INJECTION, SOLUTION INTRAMUSCULAR; INTRAVENOUS; SUBCUTANEOUS at 12:15

## 2023-03-20 RX ADMIN — ACETAMINOPHEN 975 MG: 325 TABLET ORAL at 08:50

## 2023-03-20 RX ADMIN — VANCOMYCIN HYDROCHLORIDE 1000 MG: 1 INJECTION, SOLUTION INTRAVENOUS at 05:57

## 2023-03-20 RX ADMIN — GABAPENTIN 100 MG: 100 CAPSULE ORAL at 08:50

## 2023-03-20 RX ADMIN — ACETAMINOPHEN 975 MG: 325 TABLET ORAL at 17:21

## 2023-03-20 RX ADMIN — OXYCODONE HYDROCHLORIDE 5 MG: 5 TABLET ORAL at 00:35

## 2023-03-20 RX ADMIN — HYDRALAZINE HYDROCHLORIDE 25 MG: 25 TABLET, FILM COATED ORAL at 16:33

## 2023-03-20 RX ADMIN — WARFARIN SODIUM 2.5 MG: 2.5 TABLET ORAL at 17:21

## 2023-03-20 RX ADMIN — DIAZEPAM 2 MG: 2 TABLET ORAL at 11:35

## 2023-03-20 RX ADMIN — ATORVASTATIN CALCIUM 40 MG: 40 TABLET, FILM COATED ORAL at 08:50

## 2023-03-20 RX ADMIN — FAMOTIDINE 20 MG: 20 TABLET ORAL at 20:12

## 2023-03-20 RX ADMIN — HYDROXYCHLOROQUINE SULFATE 200 MG: 200 TABLET, FILM COATED ORAL at 20:12

## 2023-03-20 RX ADMIN — HYDROMORPHONE HYDROCHLORIDE 0.5 MG: 1 INJECTION, SOLUTION INTRAMUSCULAR; INTRAVENOUS; SUBCUTANEOUS at 21:08

## 2023-03-20 RX ADMIN — Medication 1 TABLET: at 08:50

## 2023-03-20 RX ADMIN — ASPIRIN 81 MG: 81 TABLET ORAL at 08:49

## 2023-03-20 RX ADMIN — LISINOPRIL 10 MG: 10 TABLET ORAL at 20:12

## 2023-03-20 RX ADMIN — OXYCODONE HYDROCHLORIDE 5 MG: 5 TABLET ORAL at 08:50

## 2023-03-20 RX ADMIN — LISINOPRIL 10 MG: 10 TABLET ORAL at 08:50

## 2023-03-20 RX ADMIN — HYDROMORPHONE HYDROCHLORIDE 0.5 MG: 1 INJECTION, SOLUTION INTRAMUSCULAR; INTRAVENOUS; SUBCUTANEOUS at 11:45

## 2023-03-20 RX ADMIN — FERROUS SULFATE TAB 325 MG (65 MG ELEMENTAL FE) 325 MG: 325 (65 FE) TAB at 08:50

## 2023-03-20 RX ADMIN — HYDRALAZINE HYDROCHLORIDE 25 MG: 25 TABLET, FILM COATED ORAL at 08:50

## 2023-03-20 RX ADMIN — HYDRALAZINE HYDROCHLORIDE 25 MG: 25 TABLET, FILM COATED ORAL at 21:51

## 2023-03-20 RX ADMIN — METHYLCELLULOSE 1000 MG: 500 TABLET ORAL at 08:51

## 2023-03-20 RX ADMIN — OXYCODONE HYDROCHLORIDE 5 MG: 5 TABLET ORAL at 16:33

## 2023-03-20 RX ADMIN — GABAPENTIN 100 MG: 100 CAPSULE ORAL at 20:12

## 2023-03-20 RX ADMIN — HYDROXYCHLOROQUINE SULFATE 200 MG: 200 TABLET, FILM COATED ORAL at 08:50

## 2023-03-20 RX ADMIN — METHYLCELLULOSE 1000 MG: 500 TABLET ORAL at 20:16

## 2023-03-20 RX ADMIN — TAMSULOSIN HYDROCHLORIDE 0.4 MG: 0.4 CAPSULE ORAL at 08:50

## 2023-03-20 RX ADMIN — OXYCODONE HYDROCHLORIDE 5 MG: 5 TABLET ORAL at 04:41

## 2023-03-20 RX ADMIN — FAMOTIDINE 20 MG: 20 TABLET ORAL at 08:50

## 2023-03-20 RX ADMIN — OXYCODONE HYDROCHLORIDE 5 MG: 5 TABLET ORAL at 12:12

## 2023-03-20 RX ADMIN — DIGOXIN 125 MCG: 125 TABLET ORAL at 08:50

## 2023-03-20 RX ADMIN — DIAZEPAM 2 MG: 2 TABLET ORAL at 21:37

## 2023-03-20 RX ADMIN — OXYCODONE HYDROCHLORIDE 5 MG: 5 TABLET ORAL at 20:38

## 2023-03-20 ASSESSMENT — ACTIVITIES OF DAILY LIVING (ADL)
ADLS_ACUITY_SCORE: 26
ADLS_ACUITY_SCORE: 28
ADLS_ACUITY_SCORE: 26
ADLS_ACUITY_SCORE: 28
ADLS_ACUITY_SCORE: 26
ADLS_ACUITY_SCORE: 26
ADLS_ACUITY_SCORE: 28
ADLS_ACUITY_SCORE: 28

## 2023-03-20 NOTE — PROGRESS NOTES
Home Infusion  Dandy is expected to discharge in a coupe days and will be going home on IV vanco (currently q 12 hrs).   He has been on service with [facility] in the past as recently as Nov '22 and self administered his medication.   Benefits have been checked and pt will have coverage 100% coverage  through his  [employer] policy since he has met his deductible.  Dandy will also discontinue with a wound vac that will require 3x/week dressing changes by home RN is able to secure in his area.    Met with Dandy at bedside to discuss discharge plans and assess for needs.    Confirmed administration method for the vanco which he has done before and offered review/teach.  Dandy stated he remembers how to administer, that's it's pretty simple  And he does not need/want a review.   Informed him that we are looking for a home care agency in his area to do his wound vac changes, PICC dressing changes and labs. Dandy is agreeable.  Reminded him about delivery plans, storage of his medication and 24/7 support from [facility] while on the IV abx.   [facility] will need to deliver drug and supplies to the hospital on day of discharge which will require a 4-5 hr turnaround time from when orders are completd.  Dandy verbalized understanding of information given and is agreeable with the plan.  Will continue to follow and update pt as appropriate.    Courtney CHOWDHURY  Nurse Liaison  [facility] I [email or web address]  [address] [place] [address]  [email or web address]  [phone] M-F I [facility] main: [phone]

## 2023-03-20 NOTE — PHARMACY-VANCOMYCIN DOSING SERVICE
Pharmacy Vancomycin Note  Date of Service 2023  Patient's  1960   62 year old, male    Indication: LVAD driveline infection   Day of Therapy: 3  Current vancomycin regimen:  1000 mg IV q12h  Current vancomycin monitoring method: AUC  Current vancomycin therapeutic monitoring goal: 400-600 mg*h/L    Insight[employer] Prediction of Current Vancomycin Regimen  Exposure target: AUC24 (range)400-600 mg/L.hr   AUC24,ss: 687 mg/L.hr  Probability of AUC24 > 400: 100 %  Ctrough,ss: 22.4 mg/L  Probability of Ctrough,ss > 20: 70 %  Probability of nephrotoxicity (Lodise TAD ): 22 %      Current estimated CrCl = Estimated Creatinine Clearance: 81.8 mL/min (based on SCr of 0.88 mg/dL).    Creatinine for last 3 days  3/18/2023:  1:48 PM Creatinine 0.76 mg/dL  3/19/2023:  7:37 AM Creatinine 0.91 mg/dL  3/20/2023:  6:01 AM Creatinine 0.88 mg/dL    Recent Vancomycin Levels (past 3 days)  3/20/2023:  6:01 AM Vancomycin 18.4 ug/mL;  4:26 PM Vancomycin 21.3 ug/mL    Vancomycin IV Administrations (past 72 hours)                   vancomycin (VANCOCIN) 1000 mg in dextrose 5% 200 mL PREMIX (mg) 1,000 mg New Bag 23 0557     1,000 mg New Bag 23 1745     1,000 mg New Bag  0450    vancomycin (VANCOCIN) 1,500 mg in 0.9% NaCl 250 mL intermittent infusion (mg) 1,500 mg New Bag 23 1533                Nephrotoxins and other renal medications (From now, onward)    Start     Dose/Rate Route Frequency Ordered Stop    23 0600  vancomycin (VANCOCIN) 1,250 mg in 0.9% NaCl 250 mL intermittent infusion         1,250 mg  over 90 Minutes Intravenous EVERY 24 HOURS 23 1755      23 2030  lisinopril (ZESTRIL) tablet 10 mg        Note to Pharmacy: PTA Sig:Take 1 tablet (10 mg) by mouth 2 times daily      10 mg Oral 2 TIMES DAILY 23               Contrast Orders - past 72 hours (72h ago, onward)    None          Interpretation of levels and current regimen:  Vancomycin level is reflective of AUC  400-600    Has serum creatinine changed greater than 50% in last 72 hours: No    Urine output: decreased over past 2 days     Renal Function: Stable    Insight[employer] Prediction of Planned New Vancomycin Regimen  Exposure target: AUC24 (range)400-600 mg/L.hr   AUC24,ss: 446 mg/L.hr  Probability of AUC24 > 400: 75 %  Ctrough,ss: 12 mg/L  Probability of Ctrough,ss > 20: 3 %  Probability of nephrotoxicity (Lodise TAD 2009): 7 %          Plan:  1. Change to 1250 mg IV q24h with first dose on 3/21.  2. Vancomycin monitoring method: AUC  3. Vancomycin therapeutic monitoring goal: 400-600 mg*h/L  4. Pharmacy will check vancomycin levels as appropriate in 1-3 Days.    Donna Camara, H

## 2023-03-20 NOTE — PHARMACY-VANCOMYCIN DOSING SERVICE
Pharmacy Vancomycin Note  Date of Service 2023  Patient's  1960   62 year old, male    Indication: LVAD driveline infection (h/o Corynebacterium)  Day of Therapy: 3  Current vancomycin regimen:  1500 mg IV x1 then 1000 mg IV q12h  Current vancomycin monitoring method: AUC  Current vancomycin therapeutic monitoring goal: 400-600 mg*h/L    Current estimated CrCl = Estimated Creatinine Clearance: 81.8 mL/min (based on SCr of 0.88 mg/dL).    Creatinine for last 3 days  3/17/2023:  2:05 PM Creatinine 0.78 mg/dL  3/18/2023:  1:48 PM Creatinine 0.76 mg/dL  3/19/2023:  7:37 AM Creatinine 0.91 mg/dL  3/20/2023:  6:01 AM Creatinine 0.88 mg/dL    Recent Vancomycin Levels (past 3 days)  3/20/2023:  6:01 AM Vancomycin 18.4 ug/mL    Vancomycin IV Administrations (past 72 hours)                   vancomycin (VANCOCIN) 1000 mg in dextrose 5% 200 mL PREMIX (mg) 1,000 mg New Bag 23 0557     1,000 mg New Bag 23 1745     1,000 mg New Bag  0450    vancomycin (VANCOCIN) 1,500 mg in 0.9% NaCl 250 mL intermittent infusion (mg) 1,500 mg New Bag 23 1533                Nephrotoxins and other renal medications (From now, onward)    Start     Dose/Rate Route Frequency Ordered Stop    23 0400  vancomycin (VANCOCIN) 1000 mg in dextrose 5% 200 mL PREMIX         1,000 mg  200 mL/hr over 1 Hours Intravenous EVERY 12 HOURS 23 1449      23  lisinopril (ZESTRIL) tablet 10 mg        Note to Pharmacy: PTA Sig:Take 1 tablet (10 mg) by mouth 2 times daily      10 mg Oral 2 TIMES DAILY 23               Contrast Orders - past 72 hours (72h ago, onward)    None          Interpretation of levels and current regimen:  Vancomycin level is reflective of unclear as level drawn after dose was given.    Has serum creatinine changed greater than 50% in last 72 hours: No    Urine output:  good urine output    Renal Function: Stable    Plan:  1. Vancomycin level drawn after vancomycin dose started.  Unclear if level is reflective of true trough level or if inaccurate due to timing and/or drawing from line that vancomycin was infusing in. Given patient has active infection, would favor repeating level at 1600 today to make an accurate assessment.   2. Vancomycin monitoring method: AUC  3. Vancomycin therapeutic monitoring goal: 400-600 mg*h/L  4. Serum creatinine levels will be ordered daily for the first week of therapy and at least twice weekly for subsequent weeks.    Natalie Mcpherson, PharmD, BCCP, BCPS

## 2023-03-20 NOTE — PLAN OF CARE
D: Pt admitted for wound debridement 3/18. Pt stable and comfortable. AVSS, LVAD #s WNL. Incisional pain adequately controlled with oxycodone. IV dilaudid x 1 as premedication for dressing change/packing and x 1 for breakthrough pain. No shortness of breath noted. Small bump from previous PIV on R forearm marked.   I: Monitored/assessed pt. Assisted with cares.  A: Pt stable and comfortable.  P: Continue to monitor/assess pt, contact provider with concerns. Wound vac placement 3/21, machine on shelf. Plan to DC on home infusion.

## 2023-03-20 NOTE — PROGRESS NOTES
Cardiovascular Surgery Progress Note  03/20/2023         Assessment and Plan:     Dandy Sands is a 62 year old male with a PMH of SLE, antiphospholipid syndrome, CAD, CHF 2/2 ICM s/p LVAD 7/8/22, history of C.diff who presents from clinic for management of chronic driveline infection. S/p I&D of driveline 3/18 with Dr. Zamora.      Cardiovascular:   S/p LVAD placement (HeartMate 3) on 7/8/22   Advanced ischemic cardiomyopathy, class IV, stage D  CAD s/p PCI to LAD (2005)  S/p CRT-D (2006)  History of MI (2007)  Severe MR  Antiphospholipid antibody syndrome on chronic warfarin  HTN, HLD  No arrhythmias overnight, HD stable, in NSR.  Most recent echo showed LV EF 15-20%  - PTA ASA 81 mg daily  - PTA Atorvastatin 40 mg daily  - PTA digoxin 125mcg  - PTA lisinopril 10 mg QD  - PTA Hydralazine 25 mg TID      Pulmonary:  Saturating well on RA  - Supplemental O2 PRN to keep sats > 92%. Wean off as tolerated.  - Pulm hygiene, IS, activity and deep breathing    Neurology / MSK:  Acute post-operative pain   Pain well controlled with current regimen:  - Acetaminophen  - PO and IV dilaudid prn  - Patient currently requiring IV Dilaudid prior to dressing changes.      / Renal / Fluid / Electrolytes:  Hyponatemia, stable  BL creat ~ 0.7, most recent creatinine 0.78; adequate UOP.   FLUID STATUS; I/O past 24 hours: -1.2L  - Diuresis not indicated, PTA 20 mg lasix prn   - Replete lytes per protocol  - Strict I/O, daily weights  - Avoid/limit nephrotoxins as able    GI / Nutrition:   - Currently NPO for I&D today  - Continue bowel regimen    Endocrine:  Stress induced hyperglycemia   Hgb A1c 5.5    Infectious Disease:  1+ Corynebacterium striatum from OR wound cultures  WBC 6.5, remains afebrile, no signs or symptoms of infection  - Completed perioperative antibiotics  - Continue to monitor fever curve, CBC  ID consulted after OR: plan to continue vancomycin, will review and wait for final cultures    Hematology:   Hgb 8.7;  Plt 282, no signs or symptoms of active bleeding  - CBC daily    Anticoagulation:   - ASA 81 mg daiy  - Coumadin for LVAD, INR goal 2-3. Most recent INR 3.59.      MSK/Skin:  Sternal wound I&D site  LVAD Driveline exit site  - Driveline dressing changes per protocol  - chest wound I&D site: BID dressing changes with sterile packing. Plan to VAC tomorrow if wound is hemostatic/ clean    Prophylaxis:   - Stress ulcer prophylaxis: Pantoprazole 40 mg daily for 30 days  - DVT prophylaxis: Subcutaneous heparin, SCD      Discussed with Dr. Noah Zabala PA-C   Cardiothoracic Surgery   Pager #[phone]  March 20, 2023 at 12:42 PM            Interval History:     Pain better controlled. Tolerated dressing changes with IV dilaudid. Wound hemostatic. Mild purulence. Continuing BID dressing changes and possible VAC tomorrow.  Denies chest pain, palpitations, dizziness, syncopal symptoms, fevers, chills, myalgias, or sternal popping/clicking.         Physical Exam:     Gen: A&Ox4, NAD  Neuro: no focal deficits   CV: RRR, normal S1 S2, no murmurs, rubs or gallops  Pulm: CTA, no wheezing or rhonchi, normal breathing on RA  Abd: nondistended, normal BS, soft, nontender  Ext: no peripheral edema  Incision: clean, dry, intact,   Chest wound with open packing, hemostatic, mild purulence  Lines: driveline dressing clean  LVAD: speed 5100, flow 3.3, PI 3.9, power 3.4.          Data:    Imaging:  reviewed recent imaging, no acute concerns    No results found for this or any previous visit (from the past 24 hour(s)).     Labs:  Recent Labs   Lab 03/20/23  0601 03/19/23  0737 03/18/23  1348 03/18/23  0706 03/17/23  1405   WBC 5.5 6.5 6.2  --  7.9   HGB 8.6* 8.7* 8.2*  --  7.8*   MCV 86 86 85  --  85    282 281  --  277   INR 3.46* 3.59*  --  2.99* 2.66*   * 132* 134*  --  132*   POTASSIUM 4.1 4.2 4.5  --  4.3   CHLORIDE 98 100 104  --  101   CO2 20* 21* 19*  --  19*   BUN 19.7 20.2 20.3  --  23.2*   CR 0.88 0.91  0.76  --  0.78   ANIONGAP 15 11 11  --  12   ELDA 9.0 9.0 9.6  --  10.3*   GLC 98 102* 79  --  84   ALBUMIN  --   --   --   --  3.2*   PROTTOTAL  --   --   --   --  7.5   BILITOTAL  --   --   --   --  0.5   ALKPHOS  --   --   --   --  192*   ALT  --   --   --   --  15   AST  --   --   --   --  22      GLUCOSE:   Recent Labs   Lab 03/20/23  0601 03/19/23  0737 03/18/23  1348 03/17/23  1405 03/17/23  1355   GLC 98 102* 79 84 82

## 2023-03-20 NOTE — PROGRESS NOTES
Indication of Interrogation:  Perioperative phase, eval hemodynamic fxn    Type of VAD:  Heartmate 3    Current Parameters:  Flow= 3.4 lpm, Speed= 5100 rpm, Power= 3.4 persaud, PI (if applicable)= 2.4-6.9    Abnormal Alarm on History:  No    Abnormal Events/Parameters Notes:  No    Changes Made during Interrogation:  No

## 2023-03-20 NOTE — PLAN OF CARE
ours of Care:  1900 - 0700    Temp:  [97.5  F (36.4  C)-98.4  F (36.9  C)] 97.8  F (36.6  C)  Pulse:  [] 65  Resp:  [16-18] 16  BP: (68-86)/(58-71) 83/64  SpO2:  [95 %-100 %] 96 %     D: Dandy Sands is a 62 year old male with a PMH of SLE, antiphospholipid syndrome, CAD, CHF 2/2 ICM s/p LVAD 7/8/22, history of C.diff who presents from clinic for management of chronic driveline infection. S/p I&D of driveline 3/18 with Dr. Zamora.    I: Monitored vitals and assessed pt status.     PRN: Hydromorphone IVP (dressing change), tylenol x1, and oxycodone x3  O2: Room air  Mobility: SBA    A: Neuro: A/O x4.  Call light appropriate.  Able to make needs known.  Respiratory:  On room air.  Lung sounds clear.  Denies shortness of breath at rest.  Cardiac: VSS.  Doppler MAP.  70-72  HM3.  Low flows.  All numbers within ordered parameters.  No alarms overnight.  Dressing CDI.  ICD.  Received 2.5 mg coumadin.  INR 3.59 (3/19).  No s/s of bleeding  GI: Last BM 3/19.  Receives scheduled Citrucel tabs.  No report of nausea or vomiting.  : Urinating adequate amounts of clear, yellow urine  Skin:  See PCS for assessment and treatment of wounds and surgical incisions.  Dressing change BID.   LDA:  20 G PIV LFA  Antibiotics:  Vancomycin IV Q12H  Electrolytes: No RN managed electrolyte protocols ordered.  Pain: Reports 4-5/10 pain in I&D site.  Premedicated with PO oxycodone and IVP hydromorphone for dressing change.    Isolation:  Standard Precautions  Tests/procedures: None performed overnight.  Diet: Regular  Sleep:  No sleep disturbances noted or reported.    P: Continue to monitor Pt status and report changes to CVTS.  Plan for possible wound vac placement today.

## 2023-03-20 NOTE — PROGRESS NOTES
General Infectious Disease Service - Yellow Team    Patient:  Dandy Sands, Date of birth 1960, Medical record number [id number]  Date of Admission: 3/17/2023  Date of Visit:  3/20/2023         Assessment and Recommendations:   Problem List:    1. 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 Zamora). Patient has previously been treated with daptomycin (10/2022, 12/2022) and more recently has been on minocycline for suppression of Corynebacterium driveline infection. Developed redness, pain, and swelling concerning for abscess (see clinic photo from ID clinic 3/16 in media tab). Wound cultures collected from driveline drainage in clinic 3/16 and negative to date. Now s/p I&D on 3/18 with cultures sent from OR and positive for Corynebacterium striatum. Blood culture x1 NGTD. Doing well following I&D. Afebrile, no signs of systemic infection. Recommend continuing vancomycin while awaiting full culture data.     Recommendations:    1. Continue vancomycin, appreciate pharmacy dosing  2. Following wound cultures (3/16) - negative to date  3. Following susceptibilities from and OR cultures (3/19) - positive for Corynebacterium striatum  4. Following blood culture - negative to date  5. ID will continue to follow to assist with abx plan  6. Patient will need to decide if he would like to follow up with [facility] or [facility] (Dr. Steve Saravia)      The General ID team will continue to follow this patient. Please feel free to call with any questions.     LASHANDA LOMBARDO MD  Date of Service: 03/20/23  Pager:  "[phone]               Physical Exam:   BP (!) 85/35   Pulse 73   Temp 97.3  F (36.3  C) (Oral)   Resp 18   Ht 1.702 m (5' 7\")   Wt 65.6 kg (144 lb 11.2 oz)   SpO2 96%   BMI 22.66 kg/m         Exam:  GENERAL: Not in acute distress.   HEAD: Normocephalic and atraumatic  ENT:  No hearing impairment  EYES:  Eyes grossly normal to inspection, PERRL and conjunctivae and sclerae normal   LUNGS:  Rales on the left lower lung, other lung areas sound clear  CARDIOVASCULAR:  LVAD humming  ABDOMEN:  Soft, nontender  SKIN:  I&D site covered with surgical dressings  NEUROLOGIC:  Grossly nonfocal  PSYCHIATRIC: Mood stable, mentation appears normal, affect normal           Laboratory Data:     Creatinine   Date Value Ref Range Status   03/20/2023 0.88 0.67 - 1.17 mg/dL Final   03/19/2023 0.91 0.67 - 1.17 mg/dL Final   03/18/2023 0.76 0.67 - 1.17 mg/dL Final   03/17/2023 0.78 0.67 - 1.17 mg/dL Final   01/13/2023 0.85 0.67 - 1.17 mg/dL Final     WBC Count   Date Value Ref Range Status   03/20/2023 5.5 4.0 - 11.0 10e3/uL Final   03/19/2023 6.5 4.0 - 11.0 10e3/uL Final   03/18/2023 6.2 4.0 - 11.0 10e3/uL Final   03/17/2023 7.9 4.0 - 11.0 10e3/uL Final   01/13/2023 7.1 4.0 - 11.0 10e3/uL Final     Hemoglobin   Date Value Ref Range Status   03/20/2023 8.6 (L) 13.3 - 17.7 g/dL Final     Platelet Count   Date Value Ref Range Status   03/20/2023 297 150 - 450 10e3/uL Final     Lab Results   Component Value Date     (L) 03/20/2023    BUN 19.7 03/20/2023    CO2 20 (L) 03/20/2023     CRP Inflammation   Date Value Ref Range Status   06/19/2022 28.0 (H) 0.0 - 8.0 mg/L Final        "

## 2023-03-20 NOTE — PROGRESS NOTES
VAD Social Work Services Progress Note      Date of Initial Social Work Evaluation: May 18, 2022. Worked with Dandy after his admission 6/20/2022 where he received an LVAD in July.  Collaborated with: Dandy    Data: Dandy was admitted to the hospital due to driveline infection and need for surgery to clean out his Abscess. He now has an open wound and will require a wound vac prior to discharge. Writer was also informed that he will most likely need IV abx as well.  Intervention: Met with Dandy for a supportive visit. Offered support and inquired into questions/concerns.  Assessment: Dandy was talkative and exhibited good coping skills. He reports that he was finally able to sell his house in [place], [place] which was about 25 miles aware from his kids. He now lives in an apartment 4 miles aware from his adult children. Both of them take turns coming over to do his dressing change. Dandy reports that he is able to live alone and has been able to get back to driving. He is sorting out questions about his health insurance as he is paying COBRA, but learned that his company is going back to [place] instead of [facility]. He is working with an  rep on this issue. No other questions or concerns noted.  Education provided by SW: Ongoing SW availability, as well as support group info  Plan:    Discharge Plans in Progress: Home with home care and home infusion    Barriers to d/c plan: Medical condition    Follow up Plan: SW will remain available as needed. RNCC working on setting up home care.

## 2023-03-21 ENCOUNTER — APPOINTMENT (OUTPATIENT)
Dept: ULTRASOUND IMAGING | Facility: CLINIC | Age: 63
DRG: 315 | End: 2023-03-21
Attending: PHYSICIAN ASSISTANT
Payer: COMMERCIAL

## 2023-03-21 LAB
ANION GAP SERPL CALCULATED.3IONS-SCNC: 13 MMOL/L (ref 7–15)
BACTERIA ABSC ANAEROBE+AEROBE CULT: NO GROWTH
BUN SERPL-MCNC: 19.7 MG/DL (ref 8–23)
CALCIUM SERPL-MCNC: 9 MG/DL (ref 8.8–10.2)
CHLORIDE SERPL-SCNC: 102 MMOL/L (ref 98–107)
CREAT SERPL-MCNC: 0.87 MG/DL (ref 0.67–1.17)
DEPRECATED HCO3 PLAS-SCNC: 19 MMOL/L (ref 22–29)
ERYTHROCYTE [DISTWIDTH] IN BLOOD BY AUTOMATED COUNT: 19.4 % (ref 10–15)
GFR SERPL CREATININE-BSD FRML MDRD: >90 ML/MIN/1.73M2
GLUCOSE SERPL-MCNC: 111 MG/DL (ref 70–99)
HCT VFR BLD AUTO: 30.1 % (ref 40–53)
HGB BLD-MCNC: 9.1 G/DL (ref 13.3–17.7)
INR PPP: 2.88 (ref 0.85–1.15)
MCH RBC QN AUTO: 26.2 PG (ref 26.5–33)
MCHC RBC AUTO-ENTMCNC: 30.2 G/DL (ref 31.5–36.5)
MCV RBC AUTO: 87 FL (ref 78–100)
PLATELET # BLD AUTO: 313 10E3/UL (ref 150–450)
POTASSIUM SERPL-SCNC: 4.4 MMOL/L (ref 3.4–5.3)
RBC # BLD AUTO: 3.47 10E6/UL (ref 4.4–5.9)
SODIUM SERPL-SCNC: 134 MMOL/L (ref 136–145)
WBC # BLD AUTO: 7.1 10E3/UL (ref 4–11)

## 2023-03-21 PROCEDURE — 214N000001 HC R&B CCU UMMC

## 2023-03-21 PROCEDURE — 250N000013 HC RX MED GY IP 250 OP 250 PS 637: Performed by: SURGERY

## 2023-03-21 PROCEDURE — 250N000011 HC RX IP 250 OP 636: Performed by: PHYSICIAN ASSISTANT

## 2023-03-21 PROCEDURE — 93971 EXTREMITY STUDY: CPT | Mod: 26 | Performed by: RADIOLOGY

## 2023-03-21 PROCEDURE — 85610 PROTHROMBIN TIME: CPT | Performed by: SURGERY

## 2023-03-21 PROCEDURE — 36569 INSJ PICC 5 YR+ W/O IMAGING: CPT

## 2023-03-21 PROCEDURE — 272N000450 HC KIT 4FR POWER PICC SINGLE LUMEN

## 2023-03-21 PROCEDURE — 82310 ASSAY OF CALCIUM: CPT | Performed by: SURGERY

## 2023-03-21 PROCEDURE — 250N000013 HC RX MED GY IP 250 OP 250 PS 637: Performed by: PHYSICIAN ASSISTANT

## 2023-03-21 PROCEDURE — 272N000473 HC KIT, VPS RHYTHM STYLET

## 2023-03-21 PROCEDURE — 99231 SBSQ HOSP IP/OBS SF/LOW 25: CPT | Mod: 24 | Performed by: PHYSICIAN ASSISTANT

## 2023-03-21 PROCEDURE — 250N000011 HC RX IP 250 OP 636: Performed by: THORACIC SURGERY (CARDIOTHORACIC VASCULAR SURGERY)

## 2023-03-21 PROCEDURE — 36415 COLL VENOUS BLD VENIPUNCTURE: CPT | Performed by: SURGERY

## 2023-03-21 PROCEDURE — 85027 COMPLETE CBC AUTOMATED: CPT | Performed by: SURGERY

## 2023-03-21 PROCEDURE — 250N000013 HC RX MED GY IP 250 OP 250 PS 637: Performed by: STUDENT IN AN ORGANIZED HEALTH CARE EDUCATION/TRAINING PROGRAM

## 2023-03-21 PROCEDURE — 258N000003 HC RX IP 258 OP 636: Performed by: THORACIC SURGERY (CARDIOTHORACIC VASCULAR SURGERY)

## 2023-03-21 PROCEDURE — 93971 EXTREMITY STUDY: CPT | Mod: RT

## 2023-03-21 PROCEDURE — 99232 SBSQ HOSP IP/OBS MODERATE 35: CPT | Performed by: INTERNAL MEDICINE

## 2023-03-21 PROCEDURE — 250N000013 HC RX MED GY IP 250 OP 250 PS 637: Performed by: THORACIC SURGERY (CARDIOTHORACIC VASCULAR SURGERY)

## 2023-03-21 RX ORDER — LIDOCAINE 40 MG/G
CREAM TOPICAL
Status: ACTIVE | OUTPATIENT
Start: 2023-03-21 | End: 2023-03-24

## 2023-03-21 RX ORDER — HEPARIN SODIUM,PORCINE 10 UNIT/ML
5-20 VIAL (ML) INTRAVENOUS EVERY 24 HOURS
Status: DISCONTINUED | OUTPATIENT
Start: 2023-03-21 | End: 2023-03-26 | Stop reason: HOSPADM

## 2023-03-21 RX ORDER — LANOLIN ALCOHOL/MO/W.PET/CERES
3 CREAM (GRAM) TOPICAL
Status: DISCONTINUED | OUTPATIENT
Start: 2023-03-21 | End: 2023-03-26 | Stop reason: HOSPADM

## 2023-03-21 RX ORDER — HEPARIN SODIUM,PORCINE 10 UNIT/ML
5-20 VIAL (ML) INTRAVENOUS
Status: DISCONTINUED | OUTPATIENT
Start: 2023-03-21 | End: 2023-03-26 | Stop reason: HOSPADM

## 2023-03-21 RX ORDER — WARFARIN SODIUM 5 MG/1
5 TABLET ORAL
Status: COMPLETED | OUTPATIENT
Start: 2023-03-21 | End: 2023-03-21

## 2023-03-21 RX ADMIN — LISINOPRIL 10 MG: 10 TABLET ORAL at 08:36

## 2023-03-21 RX ADMIN — GABAPENTIN 100 MG: 100 CAPSULE ORAL at 20:21

## 2023-03-21 RX ADMIN — WARFARIN SODIUM 5 MG: 5 TABLET ORAL at 18:43

## 2023-03-21 RX ADMIN — OXYCODONE HYDROCHLORIDE 5 MG: 5 TABLET ORAL at 23:47

## 2023-03-21 RX ADMIN — TAMSULOSIN HYDROCHLORIDE 0.4 MG: 0.4 CAPSULE ORAL at 08:35

## 2023-03-21 RX ADMIN — LISINOPRIL 10 MG: 10 TABLET ORAL at 20:22

## 2023-03-21 RX ADMIN — Medication 1 TABLET: at 08:35

## 2023-03-21 RX ADMIN — Medication 5 ML: at 16:07

## 2023-03-21 RX ADMIN — DIGOXIN 125 MCG: 125 TABLET ORAL at 08:35

## 2023-03-21 RX ADMIN — METHYLCELLULOSE 1000 MG: 500 TABLET ORAL at 08:36

## 2023-03-21 RX ADMIN — GABAPENTIN 100 MG: 100 CAPSULE ORAL at 14:45

## 2023-03-21 RX ADMIN — HYDROXYCHLOROQUINE SULFATE 200 MG: 200 TABLET, FILM COATED ORAL at 08:35

## 2023-03-21 RX ADMIN — FAMOTIDINE 20 MG: 20 TABLET ORAL at 20:22

## 2023-03-21 RX ADMIN — FAMOTIDINE 20 MG: 20 TABLET ORAL at 08:35

## 2023-03-21 RX ADMIN — METHYLCELLULOSE 1000 MG: 500 TABLET ORAL at 20:22

## 2023-03-21 RX ADMIN — ACETAMINOPHEN 975 MG: 325 TABLET ORAL at 14:45

## 2023-03-21 RX ADMIN — DIAZEPAM 2 MG: 2 TABLET ORAL at 15:52

## 2023-03-21 RX ADMIN — DIAZEPAM 2 MG: 2 TABLET ORAL at 22:05

## 2023-03-21 RX ADMIN — ACETAMINOPHEN 975 MG: 325 TABLET ORAL at 06:14

## 2023-03-21 RX ADMIN — FERROUS SULFATE TAB 325 MG (65 MG ELEMENTAL FE) 325 MG: 325 (65 FE) TAB at 08:36

## 2023-03-21 RX ADMIN — HYDROMORPHONE HYDROCHLORIDE 0.5 MG: 1 INJECTION, SOLUTION INTRAMUSCULAR; INTRAVENOUS; SUBCUTANEOUS at 12:41

## 2023-03-21 RX ADMIN — HYDRALAZINE HYDROCHLORIDE 25 MG: 25 TABLET, FILM COATED ORAL at 22:05

## 2023-03-21 RX ADMIN — HYDRALAZINE HYDROCHLORIDE 25 MG: 25 TABLET, FILM COATED ORAL at 16:05

## 2023-03-21 RX ADMIN — HYDRALAZINE HYDROCHLORIDE 25 MG: 25 TABLET, FILM COATED ORAL at 08:35

## 2023-03-21 RX ADMIN — GABAPENTIN 100 MG: 100 CAPSULE ORAL at 08:36

## 2023-03-21 RX ADMIN — HYDROMORPHONE HYDROCHLORIDE 0.5 MG: 1 INJECTION, SOLUTION INTRAMUSCULAR; INTRAVENOUS; SUBCUTANEOUS at 19:00

## 2023-03-21 RX ADMIN — ATORVASTATIN CALCIUM 40 MG: 40 TABLET, FILM COATED ORAL at 08:35

## 2023-03-21 RX ADMIN — ASPIRIN 81 MG: 81 TABLET ORAL at 08:35

## 2023-03-21 RX ADMIN — OXYCODONE HYDROCHLORIDE 5 MG: 5 TABLET ORAL at 08:39

## 2023-03-21 RX ADMIN — OXYCODONE HYDROCHLORIDE 5 MG: 5 TABLET ORAL at 14:45

## 2023-03-21 RX ADMIN — OXYCODONE HYDROCHLORIDE 5 MG: 5 TABLET ORAL at 18:43

## 2023-03-21 RX ADMIN — HYDROXYCHLOROQUINE SULFATE 200 MG: 200 TABLET, FILM COATED ORAL at 20:21

## 2023-03-21 RX ADMIN — VANCOMYCIN HYDROCHLORIDE 1250 MG: 10 INJECTION, POWDER, LYOPHILIZED, FOR SOLUTION INTRAVENOUS at 06:05

## 2023-03-21 ASSESSMENT — ACTIVITIES OF DAILY LIVING (ADL)
ADLS_ACUITY_SCORE: 26

## 2023-03-21 NOTE — PLAN OF CARE
"Neuro: A&Ox4. Slightly [place].  Cardiac: Afebrile, VSS. SR with 1st degree AV block. HM3@5100 without alarm. Doppler MAPs 62-78 this shift.  Respiratory: RA  GI/: Voiding spontaneously. No BM this shift.  Diet/appetite: Tolerating regular diet. Denies nausea.  Activity: Up ad dianna.  Pain: Sternal incision site pain. Needs premedication before sternal dressing changes. Has 0.5mg IV Dilaudid ordered. Also has PRN oxycodone, tylenol and IV dilaudid 0.2mg orders.     Skin: Driveline site changed earlier today, dressing c/d/I. Sternal incision dressing changes BID. Changed this evening and packed with kerlix, covered with 4x4 and then ABD. Nursing orders read: \"BID dressing change with sterile dry wound packing\".  Lines: Left PIV placed yesterday. Tender upon initial flush but then improves. Patient says PIVs do not last long with him.  Replacements: AM labs WNL.  Plan: Wound vac to sternum anticipated tomorrow. If he were to discharge with home IV abx, he will need a PICC line and teaching.      Goal Outcome Evaluation:      Plan of Care Reviewed With: patient                 "

## 2023-03-21 NOTE — PROGRESS NOTES
Cardiovascular Surgery Progress Note  03/21/2023         Assessment and Plan:     Dandy Sands is a 62 year old male with a PMH of SLE, antiphospholipid syndrome, CAD, CHF 2/2 ICM s/p LVAD 7/8/22, history of C.diff who presented from clinic for management of chronic driveline infection.  Dandy is now s/p I&D of driveline 3/18 with Dr. Zamora.     Cardiovascular:   S/p LVAD placement (HeartMate 3) on 7/8/22   Advanced ischemic cardiomyopathy, class IV, stage D  CAD s/p PCI to LAD (2005)  S/p CRT-D (2006)  History of MI (2007)  Severe MR  Antiphospholipid antibody syndrome on chronic warfarin  HTN, HLD  No arrhythmias overnight, HD stable, in NSR.  Most recent echo showed LV EF 15-20%  - PTA ASA 81 mg daily  - PTA Atorvastatin 40 mg daily  - PTA digoxin 125mcg  - PTA lisinopril 10 mg QD  - PTA Hydralazine 25 mg TID     Pulmonary:  Saturating well on RA  - Supplemental O2 PRN to keep sats > 92%. Wean off as tolerated.  - Pulm hygiene, IS, activity and deep breathing     Neurology / MSK:  Acute post-operative pain   Pain well controlled with current regimen:  - Acetaminophen  - PO and IV dilaudid prn  - Patient currently requiring IV Dilaudid prior to dressing changes but less pain than yesterday.      / Renal / Fluid / Electrolytes:  Hyponatemia, stable  BL creat ~ 0.7, most recent creatinine WNL; adequate UOP.   FLUID STATUS; I/O past 24 hours: -1.2L  - Diuresis not indicated, PTA 20 mg lasix prn   - Replete lytes per protocol  - Strict I/O, daily weights  - Avoid/limit nephrotoxins as able     GI / Nutrition:   - Reg diet  - Continue bowel regimen     Endocrine:  Stress induced hyperglycemia   Hgb A1c 5.5%, no insulin needs.     Infectious Disease:  1+ Corynebacterium striatum from OR wound cultures  WBC WNL, remains afebrile, no signs or symptoms of infection  - Completed perioperative antibiotics  - Continue to monitor fever curve, CBC  ID consulted after OR: plan to continue vancomycin, will review and wait  "for final cultures  - Plan for wound vac placement 3/22 (pain has been improving daily)     Hematology:   Hgb 9.1, Plt WNL, no signs or symptoms of active bleeding  - CBC daily     Anticoagulation:   - ASA 81 mg daiy  - Coumadin for LVAD, INR goal 2-3. Most recent INR 2.88.       MSK/Skin:  Sternal wound I&D site  LVAD Driveline exit site  - Driveline dressing changes per protocol  - chest wound I&D site: BID dressing changes with sterile packing. Plan to VAC tomorrow if wound is hemostatic/ clean     Prophylaxis:   - Stress ulcer prophylaxis: Pantoprazole 40 mg daily for 30 days  - DVT prophylaxis: Subcutaneous heparin, SCD    Disposition:  - Home with Home infusion. Has had prior PICC line training to discharge on home Vanco.   - Ordered RUE ultrasound and PICC line placement on 3/21  - Need ID final ABx recs  - Needs wound vac home order  - Planning discharge to home Thursday 3/23.     Discussed with Dr Zamora through written and verbal communication.      Ld Espinoza PA-C  Cardiothoracic Surgery  Pager [phone]    12:12 PM   March 21, 2023        Interval History:     No overnight events.  Feels good overall. No LVAD issues.  States pain is well managed on current regimen. Slept well overnight.  Tolerating diet, is passing flatus, + BM. No nausea or vomiting.  Breathing well without complaints.   Working with therapies and ambulating in halls with assistance.   Denies chest pain, palpitations, dizziness, syncopal symptoms, fevers, chills, myalgias, or sternal popping/clicking.         Physical Exam:   Blood pressure (!) 85/35, pulse 80, temperature 98.2  F (36.8  C), temperature source Oral, resp. rate 18, height 1.702 m (5' 7\"), weight 66.8 kg (147 lb 4.8 oz), SpO2 97 %.  Vitals:    03/19/23 0600 03/20/23 0431 03/21/23 0704   Weight: 70.6 kg (155 lb 10.3 oz) 65.6 kg (144 lb 11.2 oz) 66.8 kg (147 lb 4.8 oz)      Weight; - 4 lbs since admit and trending up.   24 hr Fluid status; net loss 979 mL. UOP 1.2 L "   MAPs: 70's    Gen: A&Ox4, NAD  Neuro: no focal deficits   CV: LVAD humming, RRR, normal S1 S2, no murmurs, rubs or gallops.   Pulm: no gross wheezing or rhonchi, normal breathing on RA  Abd: nondistended, normal BS, soft, nontender  Ext: no peripheral edema  Incision: clean, no drainage or bleeding (5 cm x 3 cm x 2 cm deep), 2 cm induration surrounding wound. Replaced Kerlix dressing today due to pain.  Lines: driveline dressing clean and dry   LVAD: speed 5100, flow 3.3 - 3.7, PI 3.7 - 4.7, power 3.4 - 3.5.          Data:    Imaging:  reviewed recent imaging, no acute concerns    Labs:  BMP  Recent Labs   Lab 03/21/23  0625 03/20/23  0601 03/19/23  0737 03/18/23  1348   * 133* 132* 134*   POTASSIUM 4.4 4.1 4.2 4.5   CHLORIDE 102 98 100 104   ELDA 9.0 9.0 9.0 9.6   CO2 19* 20* 21* 19*   BUN 19.7 19.7 20.2 20.3   CR 0.87 0.88 0.91 0.76   * 98 102* 79     CBC  Recent Labs   Lab 03/21/23  0625 03/20/23  0601 03/19/23  0737 03/18/23  1348   WBC 7.1 5.5 6.5 6.2   RBC 3.47* 3.35* 3.34* 3.18*   HGB 9.1* 8.6* 8.7* 8.2*   HCT 30.1* 28.8* 28.7* 26.9*   MCV 87 86 86 85   MCH 26.2* 25.7* 26.0* 25.8*   MCHC 30.2* 29.9* 30.3* 30.5*   RDW 19.4* 19.2* 19.2* 19.2*    297 282 281     INR  Recent Labs   Lab 03/21/23  0625 03/20/23  0601 03/19/23  0737 03/18/23  0706   INR 2.88* 3.46* 3.59* 2.99*      Hepatic Panel  Recent Labs   Lab 03/17/23  1405   AST 22   ALT 15   ALKPHOS 192*   BILITOTAL 0.5   ALBUMIN 3.2*     GLUCOSE:   Recent Labs   Lab 03/21/23  0625 03/20/23  0601 03/19/23  0737 03/18/23  1348 03/17/23  1405 03/17/23  1355   * 98 102* 79 84 82

## 2023-03-21 NOTE — PLAN OF CARE
Goal Outcome Evaluation:       NSR with first degree AVB and BBB on monitor. HM3 numbers WNL. MAP 68-80 via doppler. Driveline dressing changed. Sternal wound dressing changed by CVTS this am. Wound vac supplies ordered. PRN Oxy and Tylenol effective for pain control. PRN dilaudid for BID dressing changes, see MAR. PICC to be placed today for home abx. Pt denies needs for concerns at this time. P- Continue to monitor and notify team of changes.

## 2023-03-21 NOTE — PROGRESS NOTES
General Infectious Disease Service - Yellow Team    Patient:  Dandy Sands, Date of birth 1960, Medical record number [id number]  Date of Admission: 3/17/2023  Date of Visit:  3/21/2023         Assessment and Recommendations:   Problem List:  List:    1. 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 Zamora). Patient has previously been treated with daptomycin (10/2022, 12/2022) and more recently has been on minocycline for suppression of Corynebacterium driveline infection. Developed redness, pain, and swelling concerning for abscess (see clinic photo from ID clinic 3/16 in media tab). Wound cultures collected from driveline drainage in clinic 3/16 and negative to date. Now s/p I&D on 3/18 with cultures sent from OR and positive for Corynebacterium striatum. Blood culture x1 NGTD. Doing well following I&D. Afebrile, no signs of systemic infection. Recommend continuing vancomycin while awaiting full culture data.      Recommendations:     1. Continue vancomycin, appreciate pharmacy dosing     - Plan will be to complete 6 weeks of IV vancomycin (3/18/23-4/29/23) followed by return of oral chronic suppression ->  minocycline 100 mg po q 12h    2. Patient has a PICC in place    3.  While on antibiotics, please obtain at least weekly CBC, BMP and CRP (daily CBC and BMP while in hospital). Patient will also need vancomycin level monitoring according to pharmacy protocol and pharmacy support for the duration of antimicrobial treatment. If the patient prefers to be followed at the [facility] ([place]), the labs will need to  "be faxed to his local ID provider (Dr. Steve Saravia - clinic number [phone])    4. If the patient prefers to be followed at the [facility] ([place]), his local ID provider (Dr. Steve Saravia - clinic number [phone]) will need to be contacted prior discharge since the OPAT lab review and OPAT management will need to be performed by his local ID provider. A follow up will also need to be scheduled within 2 weeks from discharge    5. Following wound cultures (3/16) - negative to date    6. Following susceptibilities from and OR cultures (3/19) - positive for Corynebacterium striatum (requested susceptibilities today) - to assure the Corynebacterium continues to be sensitive to minocycline      The General ID team will continue to follow this patient. Please feel free to call with any questions.     LASHANDA LOMBARDO MD  Date of Service: 03/21/23  Pager: [phone]           Physical Exam:   BP (!) 85/35   Pulse 81   Temp 98.2  F (36.8  C) (Oral)   Resp 18   Ht 1.702 m (5' 7\")   Wt 66.8 kg (147 lb 4.8 oz)   SpO2 97%   BMI 23.07 kg/m         Exam:  GENERAL: Not in acute distress.   HEAD: Normocephalic and atraumatic  ENT:  No hearing impairment  EYES:  Eyes grossly normal to inspection, PERRL and conjunctivae and sclerae normal   LUNGS:  Rales on the left lower lung, other lung areas sound clear  CARDIOVASCULAR:  LVAD humming  ABDOMEN:  Soft, nontender  SKIN:  I&D site covered with surgical dressings  NEUROLOGIC:  Grossly nonfocal  PSYCHIATRIC: Mood stable, mentation appears normal, affect normal           Laboratory Data:     Creatinine   Date Value Ref Range Status   03/21/2023 0.87 0.67 - 1.17 mg/dL Final   03/20/2023 0.88 0.67 - 1.17 mg/dL Final   03/19/2023 0.91 0.67 - 1.17 mg/dL Final   03/18/2023 0.76 0.67 - 1.17 mg/dL Final   03/17/2023 0.78 0.67 - 1.17 mg/dL Final     WBC Count   Date Value Ref Range Status   03/21/2023 7.1 4.0 - 11.0 10e3/uL Final   03/20/2023 5.5 4.0 - " 11.0 10e3/uL Final   03/19/2023 6.5 4.0 - 11.0 10e3/uL Final   03/18/2023 6.2 4.0 - 11.0 10e3/uL Final   03/17/2023 7.9 4.0 - 11.0 10e3/uL Final     Hemoglobin   Date Value Ref Range Status   03/21/2023 9.1 (L) 13.3 - 17.7 g/dL Final     Platelet Count   Date Value Ref Range Status   03/21/2023 313 150 - 450 10e3/uL Final     Lab Results   Component Value Date     (L) 03/21/2023    BUN 19.7 03/21/2023    CO2 19 (L) 03/21/2023     CRP Inflammation   Date Value Ref Range Status   06/19/2022 28.0 (H) 0.0 - 8.0 mg/L Final

## 2023-03-21 NOTE — PROCEDURES
[facility]    Single Lumen PICC Placement    Date/Time: 3/21/2023 3:49 PM  Performed by: Kym Del Real RN  Authorized by: Ld Espinoza PA   Indications: vascular access      UNIVERSAL PROTOCOL   Site Marked: Yes  Prior Images Obtained and Reviewed:  Yes  Required items: Required blood products, implants, devices and special equipment available    Patient identity confirmed:  Verbally with patient, arm band and provided demographic data  Patient was reevaluated immediately before administering moderate or deep sedation or anesthesia  Confirmation Checklist:  Patient's identity using two indicators, relevant allergies, procedure was appropriate and matched the consent or emergent situation and correct equipment/implants were available  Time out: Immediately prior to the procedure a time out was called    Universal Protocol: the Joint Commission Universal Protocol was followed    Preparation: Patient was prepped and draped in usual sterile fashion       ANESTHESIA    Anesthesia: Local infiltration  Local Anesthetic:  Lidocaine 1% without epinephrine  Anesthetic Total (mL):  3.5      SEDATION    Patient Sedated: No    Vital signs: Vital signs monitored during sedation        Preparation: skin prepped with ChloraPrep  Skin prep agent: skin prep agent completely dried prior to procedure  Sterile barriers: maximum sterile barriers were used: cap, mask, sterile gown, sterile gloves, and large sterile sheet  Hand hygiene: hand hygiene performed prior to central venous catheter insertion  Type of line used: PICC  Catheter type: single lumen  Lumen type: non-valved and power PICC  Catheter size: 4 Fr  Brand: Bard  Placement method: venipuncture, MST, ultrasound and tip navigation system  Number of attempts: 1  Difficulty threading catheter: no  Successful placement: yes  Orientation: right  Catheter to Vein (%): 10  Location: brachial vein (lateral) (0.66 cm  vein diameter)  Tip Location: SVC  Arm circumference: adults 10 cm  Extremity circumference: 26  Visible catheter length: 3  Total catheter length: 40  Dressing and securement: adhesive securement device, alcohol impregnated caps, blood cleaned with CHG, chlorhexidine disc applied, site cleansed, statlock, sterile dressing applied, glue and transparent dressing  Post procedure assessment: blood return through all ports, free fluid flow and placement verified by 3CG technology  PROCEDURE   Disposal: sharps and needle count correct at the end of procedure, needles and guidewire disposed in sharps container

## 2023-03-21 NOTE — PROGRESS NOTES
Care Management Follow Up    Length of Stay (days): 4    Expected Discharge Date: 03/24/2023     Concerns to be Addressed: discharge planning     Patient plan of care discussed at interdisciplinary rounds: Yes    Anticipated Discharge Disposition: Home      Anticipated Discharge Services: Home Infusion, Home Care  Anticipated Discharge DME: Wound vac    Education Provided on the Discharge Plan: Yes  Patient/Family in Agreement with the Plan: Yes    Referrals Placed by CM/SW: Home Infusion, Home Care  Private pay costs discussed: Not applicable    Additional Information:  Per PA, plan to place wound vac tomorrow. Confirmed pt will need 6 weeks of IV vancomycin. [facility] has reached out [facility] to see if they are able to accept pt as he has also used them in the past, awaiting confirmation that they can accept pt and provide M/W/F wound vac dressing changes. Per discussion with CVTS PA and pt, plan to hold on OP CR at this time and pt will resume once wound/infection improved.     Updated daughter Asha of the above. Asha confirmed that her brother will be pt's ride home if discharge prior to Saturday as she is going out of town. Asha would like a call tomorrow afternoon with update on discharge planning/timing after wound vac placed.    Discussed follow up plan with Asha. Asha stated she does not feel that ID follow up with [facility] is most beneficial. She said it is definitely more convenient, but since they aren't able to look at the wound site she feels that follow up here may be more beneficial depending on how soon/often. Updated CVTS PA, will need to clarify follow up rec.     Plan for PICC placement today. Pt does not feel he needs a refresher teach on IV vanco administration (he did home IV vanco last fall).     Started home wound vac order, did not have time to complete today, will submit tomorrow.     CC will continue to monitor patient's medical  condition and progress towards discharge.  Taylor Schmitz RN BSN  6C Unit Care Coordinator  Phone number: [phone]  Pager: [phone]

## 2023-03-21 NOTE — PLAN OF CARE
[id number]    VSS. LVAD stable and without alarms. Doppler MAP of 74. PICC placed at start of shift and OK'd for use, now heplocked. Pt c/o pain at incision site related positioning during PICC placement. PRN valium given with relief. Oxycodone and dilaudid given prior to dressing change. Pt tolerated dressing change well. Plan for wound vac placement tomorrow. Continue to monitor patient. Continue IV antibiotics. Notify physician with changes or concerns.

## 2023-03-21 NOTE — PLAN OF CARE
A:   Neuro: A/Ox4. Calls appropriately. Pleasant and cooperative. Denies headache, dizziness, and lightheadedness.  Cardiac/Tele: VSS. SR. HR 80's-90's. Afebrile. Denies chest pain.   Respiratory: Sats >92% on room air.   GI/: Continent. Urinal at bedside with adequate urine output. Last BM 3/19.  Diet/Appetite: Regular diet.  Skin: Driveline and sternal bandages CDI.   LDAs: L PIV- Infusing Vancomycin.   Activity: SBA  Pain: C/o sternal wound pain. PRN Tylenol given x1.     P: Sternal wound vac to be placed today. Continue to monitor and notify CVTS with changes.

## 2023-03-22 LAB
ANION GAP SERPL CALCULATED.3IONS-SCNC: 10 MMOL/L (ref 7–15)
BUN SERPL-MCNC: 21.9 MG/DL (ref 8–23)
CALCIUM SERPL-MCNC: 9.1 MG/DL (ref 8.8–10.2)
CHLORIDE SERPL-SCNC: 103 MMOL/L (ref 98–107)
CREAT SERPL-MCNC: 0.79 MG/DL (ref 0.67–1.17)
DEPRECATED HCO3 PLAS-SCNC: 21 MMOL/L (ref 22–29)
ERYTHROCYTE [DISTWIDTH] IN BLOOD BY AUTOMATED COUNT: 19.3 % (ref 10–15)
GFR SERPL CREATININE-BSD FRML MDRD: >90 ML/MIN/1.73M2
GLUCOSE SERPL-MCNC: 102 MG/DL (ref 70–99)
HCT VFR BLD AUTO: 28.5 % (ref 40–53)
HGB BLD-MCNC: 8.5 G/DL (ref 13.3–17.7)
INR PPP: 2.45 (ref 0.85–1.15)
MCH RBC QN AUTO: 26 PG (ref 26.5–33)
MCHC RBC AUTO-ENTMCNC: 29.8 G/DL (ref 31.5–36.5)
MCV RBC AUTO: 87 FL (ref 78–100)
PLATELET # BLD AUTO: 324 10E3/UL (ref 150–450)
POTASSIUM SERPL-SCNC: 4.5 MMOL/L (ref 3.4–5.3)
RBC # BLD AUTO: 3.27 10E6/UL (ref 4.4–5.9)
SODIUM SERPL-SCNC: 134 MMOL/L (ref 136–145)
WBC # BLD AUTO: 6.7 10E3/UL (ref 4–11)

## 2023-03-22 PROCEDURE — 250N000013 HC RX MED GY IP 250 OP 250 PS 637: Performed by: SURGERY

## 2023-03-22 PROCEDURE — 250N000013 HC RX MED GY IP 250 OP 250 PS 637: Performed by: STUDENT IN AN ORGANIZED HEALTH CARE EDUCATION/TRAINING PROGRAM

## 2023-03-22 PROCEDURE — 250N000011 HC RX IP 250 OP 636: Performed by: THORACIC SURGERY (CARDIOTHORACIC VASCULAR SURGERY)

## 2023-03-22 PROCEDURE — 82310 ASSAY OF CALCIUM: CPT | Performed by: SURGERY

## 2023-03-22 PROCEDURE — 999N000007 HC SITE CHECK

## 2023-03-22 PROCEDURE — 85610 PROTHROMBIN TIME: CPT | Performed by: SURGERY

## 2023-03-22 PROCEDURE — 85014 HEMATOCRIT: CPT | Performed by: SURGERY

## 2023-03-22 PROCEDURE — 258N000003 HC RX IP 258 OP 636: Performed by: THORACIC SURGERY (CARDIOTHORACIC VASCULAR SURGERY)

## 2023-03-22 PROCEDURE — 214N000001 HC R&B CCU UMMC

## 2023-03-22 PROCEDURE — 250N000011 HC RX IP 250 OP 636: Performed by: PHYSICIAN ASSISTANT

## 2023-03-22 PROCEDURE — 250N000013 HC RX MED GY IP 250 OP 250 PS 637: Performed by: THORACIC SURGERY (CARDIOTHORACIC VASCULAR SURGERY)

## 2023-03-22 PROCEDURE — 250N000013 HC RX MED GY IP 250 OP 250 PS 637: Performed by: PHYSICIAN ASSISTANT

## 2023-03-22 PROCEDURE — 36415 COLL VENOUS BLD VENIPUNCTURE: CPT | Performed by: SURGERY

## 2023-03-22 RX ORDER — METHOCARBAMOL 500 MG/1
500 TABLET, FILM COATED ORAL 4 TIMES DAILY
Status: DISCONTINUED | OUTPATIENT
Start: 2023-03-22 | End: 2023-03-23

## 2023-03-22 RX ORDER — WARFARIN SODIUM 5 MG/1
5 TABLET ORAL
Status: COMPLETED | OUTPATIENT
Start: 2023-03-22 | End: 2023-03-22

## 2023-03-22 RX ORDER — WARFARIN SODIUM 4 MG/1
4 TABLET ORAL
Status: CANCELLED | OUTPATIENT
Start: 2023-03-22 | End: 2023-03-22

## 2023-03-22 RX ORDER — HYDROMORPHONE HYDROCHLORIDE 2 MG/1
2-4 TABLET ORAL EVERY 4 HOURS PRN
Status: DISCONTINUED | OUTPATIENT
Start: 2023-03-22 | End: 2023-03-23

## 2023-03-22 RX ORDER — OXYCODONE HYDROCHLORIDE 5 MG/1
5 TABLET ORAL ONCE
Status: COMPLETED | OUTPATIENT
Start: 2023-03-22 | End: 2023-03-22

## 2023-03-22 RX ORDER — ACETAMINOPHEN 325 MG/1
650 TABLET ORAL
Status: DISCONTINUED | OUTPATIENT
Start: 2023-03-22 | End: 2023-03-23

## 2023-03-22 RX ADMIN — LISINOPRIL 10 MG: 10 TABLET ORAL at 07:56

## 2023-03-22 RX ADMIN — ACETAMINOPHEN 650 MG: 325 TABLET ORAL at 17:44

## 2023-03-22 RX ADMIN — HYDRALAZINE HYDROCHLORIDE 25 MG: 25 TABLET, FILM COATED ORAL at 07:56

## 2023-03-22 RX ADMIN — FAMOTIDINE 20 MG: 20 TABLET ORAL at 19:28

## 2023-03-22 RX ADMIN — FAMOTIDINE 20 MG: 20 TABLET ORAL at 07:56

## 2023-03-22 RX ADMIN — TAMSULOSIN HYDROCHLORIDE 0.4 MG: 0.4 CAPSULE ORAL at 07:56

## 2023-03-22 RX ADMIN — ACETAMINOPHEN 975 MG: 325 TABLET ORAL at 07:55

## 2023-03-22 RX ADMIN — HYDRALAZINE HYDROCHLORIDE 25 MG: 25 TABLET, FILM COATED ORAL at 15:47

## 2023-03-22 RX ADMIN — METHOCARBAMOL 500 MG: 500 TABLET ORAL at 13:08

## 2023-03-22 RX ADMIN — GABAPENTIN 100 MG: 100 CAPSULE ORAL at 19:28

## 2023-03-22 RX ADMIN — ACETAMINOPHEN 650 MG: 325 TABLET ORAL at 13:07

## 2023-03-22 RX ADMIN — METHYLCELLULOSE 1000 MG: 500 TABLET ORAL at 07:57

## 2023-03-22 RX ADMIN — ATORVASTATIN CALCIUM 40 MG: 40 TABLET, FILM COATED ORAL at 07:56

## 2023-03-22 RX ADMIN — HYDROMORPHONE HYDROCHLORIDE 2 MG: 2 TABLET ORAL at 23:18

## 2023-03-22 RX ADMIN — GABAPENTIN 100 MG: 100 CAPSULE ORAL at 07:56

## 2023-03-22 RX ADMIN — HYDROXYCHLOROQUINE SULFATE 200 MG: 200 TABLET, FILM COATED ORAL at 19:28

## 2023-03-22 RX ADMIN — HYDROXYCHLOROQUINE SULFATE 200 MG: 200 TABLET, FILM COATED ORAL at 07:56

## 2023-03-22 RX ADMIN — LISINOPRIL 10 MG: 10 TABLET ORAL at 19:28

## 2023-03-22 RX ADMIN — HYDROMORPHONE HYDROCHLORIDE 0.5 MG: 1 INJECTION, SOLUTION INTRAMUSCULAR; INTRAVENOUS; SUBCUTANEOUS at 12:34

## 2023-03-22 RX ADMIN — METHYLCELLULOSE 1000 MG: 500 TABLET ORAL at 20:09

## 2023-03-22 RX ADMIN — DIGOXIN 125 MCG: 125 TABLET ORAL at 07:56

## 2023-03-22 RX ADMIN — DIAZEPAM 2 MG: 2 TABLET ORAL at 22:04

## 2023-03-22 RX ADMIN — METHOCARBAMOL 500 MG: 500 TABLET ORAL at 17:43

## 2023-03-22 RX ADMIN — Medication 1 TABLET: at 07:56

## 2023-03-22 RX ADMIN — METHOCARBAMOL 500 MG: 500 TABLET ORAL at 22:04

## 2023-03-22 RX ADMIN — OXYCODONE HYDROCHLORIDE 5 MG: 5 TABLET ORAL at 07:55

## 2023-03-22 RX ADMIN — HYDROMORPHONE HYDROCHLORIDE 2 MG: 2 TABLET ORAL at 15:47

## 2023-03-22 RX ADMIN — OXYCODONE HYDROCHLORIDE 5 MG: 5 TABLET ORAL at 11:05

## 2023-03-22 RX ADMIN — ASPIRIN 81 MG: 81 TABLET ORAL at 07:56

## 2023-03-22 RX ADMIN — ACETAMINOPHEN 650 MG: 325 TABLET ORAL at 22:04

## 2023-03-22 RX ADMIN — HYDROMORPHONE HYDROCHLORIDE 2 MG: 2 TABLET ORAL at 19:28

## 2023-03-22 RX ADMIN — HYDROMORPHONE HYDROCHLORIDE 2 MG: 2 TABLET ORAL at 20:07

## 2023-03-22 RX ADMIN — VANCOMYCIN HYDROCHLORIDE 1250 MG: 10 INJECTION, POWDER, LYOPHILIZED, FOR SOLUTION INTRAVENOUS at 06:57

## 2023-03-22 RX ADMIN — FERROUS SULFATE TAB 325 MG (65 MG ELEMENTAL FE) 325 MG: 325 (65 FE) TAB at 07:56

## 2023-03-22 RX ADMIN — WARFARIN SODIUM 5 MG: 5 TABLET ORAL at 17:43

## 2023-03-22 RX ADMIN — GABAPENTIN 100 MG: 100 CAPSULE ORAL at 13:08

## 2023-03-22 RX ADMIN — HYDRALAZINE HYDROCHLORIDE 25 MG: 25 TABLET, FILM COATED ORAL at 22:04

## 2023-03-22 ASSESSMENT — ACTIVITIES OF DAILY LIVING (ADL)
ADLS_ACUITY_SCORE: 26

## 2023-03-22 NOTE — PROGRESS NOTES
Cardiovascular Surgery Progress Note  03/22/2023         Assessment and Plan:     Dandy Sands is a 62 year old male with a PMH of SLE, antiphospholipid syndrome, CAD, CHF 2/2 ICM s/p LVAD 7/8/22, history of C.diff who presented from clinic for management of chronic driveline infection.  Dandy is now s/p I&D of driveline 3/18 with Dr. Zamora.     Cardiovascular:   S/p LVAD placement (HeartMate 3) on 7/8/22   Advanced ischemic cardiomyopathy, class IV, stage D  CAD s/p PCI to LAD (2005)  S/p CRT-D (2006)  History of MI (2007)  Severe MR  Antiphospholipid antibody syndrome on chronic warfarin  HTN, HLD  No arrhythmias overnight, HD stable, in NSR.  Most recent echo showed LV EF 15-20%  - PTA ASA 81 mg daily  - PTA Atorvastatin 40 mg daily  - PTA digoxin 125mcg  - PTA lisinopril 10 mg QD  - PTA Hydralazine 25 mg TID     Pulmonary:  Saturating well on RA  - Supplemental O2 PRN to keep sats > 92%. Wean off as tolerated.  - Pulm hygiene, IS, activity and deep breathing     Neurology / MSK:  Acute post-operative pain   Pain well controlled with current regimen:  - Acetaminophen  - PO and IV dilaudid prn  - Patient currently requiring IV Dilaudid prior to dressing changes. Was given PO oxycodone prior to wound vac placement today, however did not tolerate due to pain so was given IV dilaudid.      / Renal / Fluid / Electrolytes:  Hyponatemia, stable  BL creat ~ 0.7, most recent creatinine WNL; adequate UOP.   FLUID STATUS; I/O past 24 hours: -240 mL  - Diuresis not indicated, PTA 20 mg lasix prn   - Replete lytes per protocol  - Strict I/O, daily weights  - Avoid/limit nephrotoxins as able     GI / Nutrition:   - Reg diet  - Continue bowel regimen     Endocrine:  Stress induced hyperglycemia   Hgb A1c 5.5%, no insulin needs.     Infectious Disease:  1+ Corynebacterium striatum from OR wound cultures  WBC WNL, remains afebrile, no signs or symptoms of infection  - Completed perioperative antibiotics  - Continue to  "monitor fever curve, CBC  ID consulted after OR: plan to continue vancomycin, will review and wait for final cultures  - Wound vac placed today -- significant pain requiring IV dilaudid      Hematology:   Hgb stable, Plt WNL, no signs or symptoms of active bleeding  - CBC daily     Anticoagulation:   - ASA 81 mg daiy  - Coumadin for LVAD, INR goal 2-3. Most recent INR therapeutic      MSK/Skin:  Sternal wound I&D site  LVAD Driveline exit site  - Driveline dressing changes per protocol  - chest wound I&D site: wound vac placed as discussed above     Prophylaxis:   - Stress ulcer prophylaxis: Pantoprazole 40 mg daily for 30 days  - DVT prophylaxis: Subcutaneous heparin, SCD    Disposition:  - Home with Home infusion. Has had prior PICC line training to discharge on home Vanco.   - Ordered RUE ultrasound and PICC line placement on 3/21  - Need ID final ABx recs  - Needs wound vac home order  - Planning discharge to home, hopefully Friday 3/24 if able to tolerate wound vac change without IV dilaudid      Discussed with Dr Zamora through written and verbal communication.      Cindy Durán PA-C on 3/22/2023 at 4:50 PM          Interval History:     No overnight events.  Feels well overall. No LVAD issues.  States pain is well managed on current regimen. Slept well overnight.  Tolerating diet, is passing flatus, + BM. No nausea or vomiting.  Breathing well without complaints.   Working with therapies and ambulating in halls with assistance.   Denies chest pain, palpitations, dizziness, syncopal symptoms, fevers, chills, myalgias, or sternal popping/clicking.         Physical Exam:   Blood pressure 92/44, pulse 84, temperature 97.6  F (36.4  C), temperature source Oral, resp. rate 16, height 1.702 m (5' 7\"), weight 66.4 kg (146 lb 6.4 oz), SpO2 98 %.  Vitals:    03/20/23 0431 03/21/23 0704 03/22/23 0702   Weight: 65.6 kg (144 lb 11.2 oz) 66.8 kg (147 lb 4.8 oz) 66.4 kg (146 lb 6.4 oz)        Gen: A&Ox4, NAD  Neuro: " no focal deficits   CV: LVAD humming, RRR, normal S1 S2, no murmurs, rubs or gallops.   Pulm: no gross wheezing or rhonchi, normal breathing on RA  Abd: nondistended, normal BS, soft, nontender  Ext: no peripheral edema  Incision: clean, no drainage or bleeding (5 cm x 3 cm x 2 cm deep), 2 cm induration surrounding wound.  Lines: driveline dressing clean and dry   LVAD: speed 5150, flow 3.7-3.8, PI 3.7 - 4.8, power 3.5-4.9.          Data:    Imaging:  reviewed recent imaging, no acute concerns    Labs:  BMP  Recent Labs   Lab 03/22/23  0714 03/21/23  0625 03/20/23  0601 03/19/23  0737   * 134* 133* 132*   POTASSIUM 4.5 4.4 4.1 4.2   CHLORIDE 103 102 98 100   ELDA 9.1 9.0 9.0 9.0   CO2 21* 19* 20* 21*   BUN 21.9 19.7 19.7 20.2   CR 0.79 0.87 0.88 0.91   * 111* 98 102*     CBC  Recent Labs   Lab 03/22/23  0714 03/21/23  0625 03/20/23  0601 03/19/23  0737   WBC 6.7 7.1 5.5 6.5   RBC 3.27* 3.47* 3.35* 3.34*   HGB 8.5* 9.1* 8.6* 8.7*   HCT 28.5* 30.1* 28.8* 28.7*   MCV 87 87 86 86   MCH 26.0* 26.2* 25.7* 26.0*   MCHC 29.8* 30.2* 29.9* 30.3*   RDW 19.3* 19.4* 19.2* 19.2*    313 297 282     INR  Recent Labs   Lab 03/22/23  0714 03/21/23  0625 03/20/23  0601 03/19/23  0737   INR 2.45* 2.88* 3.46* 3.59*      Hepatic Panel  Recent Labs   Lab 03/17/23  1405   AST 22   ALT 15   ALKPHOS 192*   BILITOTAL 0.5   ALBUMIN 3.2*     GLUCOSE:   Recent Labs   Lab 03/22/23  0714 03/21/23  0625 03/20/23  0601 03/19/23  0737 03/18/23  1348 03/17/23  1405   * 111* 98 102* 79 84

## 2023-03-22 NOTE — PLAN OF CARE
D: PMH of SLE, antiphospholipid syndrome, CAD, CHF 2/2 ICM s/p LVAD 7/8/22. Admitted from clinic for management of chronic driveline infection. Now s/p I&D of driveline 3/18.    A:   Neuro: A/Ox4. Calls appropriately. Pleasant and cooperative. Denies headache, dizziness, and lightheadedness.  Cardiac/Tele: VSS. SR. HR 80's-90's. LVAD values WDL, no alarms overnight. Afebrile. Denies chest pain.   Respiratory: Sats >92% on room air.   GI/: Continent. Urinal at bedside with adequate urine output. Last BM 3/20.  Diet/Appetite: Regular diet.  Skin: Driveline and chest bandages CDI.   LDAs: R SL PICC- Infusing Vancomycin.   Activity: SBA  Pain: C/o chest wound pain. PRN Valium and PRN Oxycodone given x1.     P: Chest wound vac to be placed today. Continue to monitor and notify CVTS with changes.

## 2023-03-22 NOTE — PROGRESS NOTES
Care Management Follow Up    Length of Stay (days): 5    Expected Discharge Date: 03/22/2023     Concerns to be Addressed: discharge planning     Patient plan of care discussed at interdisciplinary rounds: Yes    Anticipated Discharge Disposition: Home     Anticipated Discharge Services: Home Infusion, Home Care  Anticipated Discharge DME: Wound vac (awaiting approval)    Education Provided on the Discharge Plan: Yes  Patient/Family in Agreement with the Plan: Yes    Referrals Placed by CM/SW: Home Infusion, Home Care    Additional Information:  Received update from [facility] ponce that [facility] and [facility] have accepted pt for home IV abx and wound care management.     Faxed script and clinicals to /[facility] for home wound vac, awaiting insurance approval.     Per PA, pt required IV pain meds for vac placement today. Will need to tolerate back change without IV pain meds prior to discharge so will stay for inpt vac change on Friday.    [facility]   Phone: [phone]    Fax: [phone]  Updated Seda that soonest discharge would be Friday 3/24.    [facility]    Pharmacy-Phone: [phone]   Fax: [phone]    Updated Lin, pharmacist on potential discharge Friday. Lin stated pt or family can  drug/supply from their office before 5pm on Friday, they are closed on the weekend. Lin stated if unable to  before 5pm they can arrange for  Pickup at the adjoining [facility] until they close at 9pm. [facility] is also open on the weekend if needed, arrangements just need to be made prior to the weekend.    Paged ID to discuss follow up plan since daughter is hesitant about following up with [facility].    Updated daughterAsha on the above arrangements.     CC will continue to monitor patient's medical condition and progress towards discharge.  Taylor Schmitz RN BSN  6C Unit Care Coordinator  Phone number: [phone]  Pager:  [phone]

## 2023-03-22 NOTE — PLAN OF CARE
"BP 92/44   Pulse 83   Temp 97.6  F (36.4  C) (Oral)   Resp 16   Ht 1.702 m (5' 7\")   Wt 66.4 kg (146 lb 6.4 oz)   SpO2 98%   BMI 22.93 kg/m      Dx Chronic VAD driveline infection s/p I&D, 3/18.    Hx includes SLE, antiphospholipid syndrome, CAD, CHF 2/2 ICM s/p LVAD 7/8/22.    AO X 4, RA, VSS, doppler map 70, afebrile, pain controlled with PRN and scheduled meds, all dressings CDI, wound vac placed today, VAD numbers WDL. SBA. Will discharge with wound vac and PICC, outpatient ABx infusions.  "

## 2023-03-23 LAB
ANION GAP SERPL CALCULATED.3IONS-SCNC: 11 MMOL/L (ref 7–15)
BACTERIA BLD CULT: NO GROWTH
BUN SERPL-MCNC: 18.1 MG/DL (ref 8–23)
CALCIUM SERPL-MCNC: 9.1 MG/DL (ref 8.8–10.2)
CHLORIDE SERPL-SCNC: 104 MMOL/L (ref 98–107)
CREAT SERPL-MCNC: 0.8 MG/DL (ref 0.67–1.17)
DEPRECATED HCO3 PLAS-SCNC: 21 MMOL/L (ref 22–29)
ERYTHROCYTE [DISTWIDTH] IN BLOOD BY AUTOMATED COUNT: 19.9 % (ref 10–15)
GFR SERPL CREATININE-BSD FRML MDRD: >90 ML/MIN/1.73M2
GLUCOSE SERPL-MCNC: 91 MG/DL (ref 70–99)
HCT VFR BLD AUTO: 28.9 % (ref 40–53)
HGB BLD-MCNC: 8.6 G/DL (ref 13.3–17.7)
INR PPP: 2.65 (ref 0.85–1.15)
MCH RBC QN AUTO: 26.1 PG (ref 26.5–33)
MCHC RBC AUTO-ENTMCNC: 29.8 G/DL (ref 31.5–36.5)
MCV RBC AUTO: 88 FL (ref 78–100)
PLATELET # BLD AUTO: 334 10E3/UL (ref 150–450)
POTASSIUM SERPL-SCNC: 4.5 MMOL/L (ref 3.4–5.3)
RBC # BLD AUTO: 3.3 10E6/UL (ref 4.4–5.9)
SODIUM SERPL-SCNC: 136 MMOL/L (ref 136–145)
VANCOMYCIN SERPL-MCNC: 14.2 UG/ML
WBC # BLD AUTO: 5.9 10E3/UL (ref 4–11)

## 2023-03-23 PROCEDURE — 85027 COMPLETE CBC AUTOMATED: CPT | Performed by: SURGERY

## 2023-03-23 PROCEDURE — 82310 ASSAY OF CALCIUM: CPT | Performed by: SURGERY

## 2023-03-23 PROCEDURE — 214N000001 HC R&B CCU UMMC

## 2023-03-23 PROCEDURE — 250N000011 HC RX IP 250 OP 636: Performed by: THORACIC SURGERY (CARDIOTHORACIC VASCULAR SURGERY)

## 2023-03-23 PROCEDURE — 258N000003 HC RX IP 258 OP 636: Performed by: THORACIC SURGERY (CARDIOTHORACIC VASCULAR SURGERY)

## 2023-03-23 PROCEDURE — 250N000013 HC RX MED GY IP 250 OP 250 PS 637: Performed by: STUDENT IN AN ORGANIZED HEALTH CARE EDUCATION/TRAINING PROGRAM

## 2023-03-23 PROCEDURE — 250N000013 HC RX MED GY IP 250 OP 250 PS 637: Performed by: SURGERY

## 2023-03-23 PROCEDURE — 250N000013 HC RX MED GY IP 250 OP 250 PS 637: Performed by: PHYSICIAN ASSISTANT

## 2023-03-23 PROCEDURE — 250N000013 HC RX MED GY IP 250 OP 250 PS 637: Performed by: THORACIC SURGERY (CARDIOTHORACIC VASCULAR SURGERY)

## 2023-03-23 PROCEDURE — 36592 COLLECT BLOOD FROM PICC: CPT | Performed by: SURGERY

## 2023-03-23 PROCEDURE — 250N000011 HC RX IP 250 OP 636: Performed by: PHYSICIAN ASSISTANT

## 2023-03-23 PROCEDURE — 85610 PROTHROMBIN TIME: CPT | Performed by: SURGERY

## 2023-03-23 PROCEDURE — 80202 ASSAY OF VANCOMYCIN: CPT | Performed by: THORACIC SURGERY (CARDIOTHORACIC VASCULAR SURGERY)

## 2023-03-23 PROCEDURE — 999N000197 HC STATISTIC WOC PT EDUCATION, 0-15 MIN

## 2023-03-23 RX ORDER — ACETAMINOPHEN 325 MG/1
975 TABLET ORAL EVERY 6 HOURS PRN
Status: DISCONTINUED | OUTPATIENT
Start: 2023-03-23 | End: 2023-03-26 | Stop reason: HOSPADM

## 2023-03-23 RX ORDER — METHOCARBAMOL 500 MG/1
500 TABLET, FILM COATED ORAL 4 TIMES DAILY PRN
Status: DISCONTINUED | OUTPATIENT
Start: 2023-03-23 | End: 2023-03-26 | Stop reason: HOSPADM

## 2023-03-23 RX ORDER — WARFARIN SODIUM 4 MG/1
4 TABLET ORAL
Status: COMPLETED | OUTPATIENT
Start: 2023-03-23 | End: 2023-03-23

## 2023-03-23 RX ADMIN — HYDROMORPHONE HYDROCHLORIDE 4 MG: 2 TABLET ORAL at 04:17

## 2023-03-23 RX ADMIN — WARFARIN SODIUM 4 MG: 4 TABLET ORAL at 18:19

## 2023-03-23 RX ADMIN — LISINOPRIL 10 MG: 10 TABLET ORAL at 20:30

## 2023-03-23 RX ADMIN — VANCOMYCIN HYDROCHLORIDE 1500 MG: 10 INJECTION, POWDER, LYOPHILIZED, FOR SOLUTION INTRAVENOUS at 10:25

## 2023-03-23 RX ADMIN — ACETAMINOPHEN 975 MG: 325 TABLET ORAL at 21:57

## 2023-03-23 RX ADMIN — HYDRALAZINE HYDROCHLORIDE 25 MG: 25 TABLET, FILM COATED ORAL at 21:56

## 2023-03-23 RX ADMIN — METHYLCELLULOSE 1000 MG: 500 TABLET ORAL at 20:29

## 2023-03-23 RX ADMIN — HYDROMORPHONE HYDROCHLORIDE 4 MG: 2 TABLET ORAL at 09:24

## 2023-03-23 RX ADMIN — ACETAMINOPHEN 650 MG: 325 TABLET ORAL at 01:56

## 2023-03-23 RX ADMIN — HYDRALAZINE HYDROCHLORIDE 25 MG: 25 TABLET, FILM COATED ORAL at 15:51

## 2023-03-23 RX ADMIN — DIGOXIN 125 MCG: 125 TABLET ORAL at 09:25

## 2023-03-23 RX ADMIN — ATORVASTATIN CALCIUM 40 MG: 40 TABLET, FILM COATED ORAL at 09:24

## 2023-03-23 RX ADMIN — ACETAMINOPHEN 650 MG: 325 TABLET ORAL at 09:24

## 2023-03-23 RX ADMIN — FAMOTIDINE 20 MG: 20 TABLET ORAL at 20:30

## 2023-03-23 RX ADMIN — GABAPENTIN 100 MG: 100 CAPSULE ORAL at 15:05

## 2023-03-23 RX ADMIN — FAMOTIDINE 20 MG: 20 TABLET ORAL at 09:25

## 2023-03-23 RX ADMIN — GABAPENTIN 100 MG: 100 CAPSULE ORAL at 20:30

## 2023-03-23 RX ADMIN — HYDROXYCHLOROQUINE SULFATE 200 MG: 200 TABLET, FILM COATED ORAL at 09:25

## 2023-03-23 RX ADMIN — Medication 5 ML: at 06:11

## 2023-03-23 RX ADMIN — METHYLCELLULOSE 1000 MG: 500 TABLET ORAL at 09:24

## 2023-03-23 RX ADMIN — TAMSULOSIN HYDROCHLORIDE 0.4 MG: 0.4 CAPSULE ORAL at 09:24

## 2023-03-23 RX ADMIN — HYDROXYCHLOROQUINE SULFATE 200 MG: 200 TABLET, FILM COATED ORAL at 20:30

## 2023-03-23 RX ADMIN — ASPIRIN 81 MG: 81 TABLET ORAL at 09:24

## 2023-03-23 RX ADMIN — LISINOPRIL 10 MG: 10 TABLET ORAL at 09:25

## 2023-03-23 RX ADMIN — ACETAMINOPHEN 650 MG: 325 TABLET ORAL at 06:34

## 2023-03-23 RX ADMIN — ACETAMINOPHEN 975 MG: 325 TABLET ORAL at 13:48

## 2023-03-23 RX ADMIN — Medication 1 TABLET: at 09:24

## 2023-03-23 RX ADMIN — HYDRALAZINE HYDROCHLORIDE 25 MG: 25 TABLET, FILM COATED ORAL at 09:24

## 2023-03-23 RX ADMIN — METHOCARBAMOL 500 MG: 500 TABLET ORAL at 18:19

## 2023-03-23 RX ADMIN — FERROUS SULFATE TAB 325 MG (65 MG ELEMENTAL FE) 325 MG: 325 (65 FE) TAB at 09:25

## 2023-03-23 RX ADMIN — METHOCARBAMOL 500 MG: 500 TABLET ORAL at 09:25

## 2023-03-23 RX ADMIN — GABAPENTIN 100 MG: 100 CAPSULE ORAL at 09:25

## 2023-03-23 ASSESSMENT — ACTIVITIES OF DAILY LIVING (ADL)
ADLS_ACUITY_SCORE: 26

## 2023-03-23 NOTE — PLAN OF CARE
D/I/A: Pt up ad dianna and independent at bedside.  A+O x4 and making needs known.  VSS, afebrile.  LVAD operating within normal limits and free from alarm.  Pleasant and cooperative.  Medicated for pain with current med orders, MD notified to increase oral Dilaudid as was not effective previously; encouraged patient to notify RN if changes made are effective or not.  Pain r/t placement and maintenance of wound vacc at lower sternal I&D site.  P: Continue to monitor status.  Monitor effectiveness of pain management.

## 2023-03-23 NOTE — PROGRESS NOTES
Care Coordinator  D/I: had vmm from [facility] Lorena x [phone]  [phone] and needs script____(was fax'd on 3/22)  P: I refax'd script and H&P. I called and left a message for Lorena at [facility]--plan discharge tomorrow.. Wound info is on script. Wait for vac approval____. Will follow.

## 2023-03-23 NOTE — PROGRESS NOTES
Cardiovascular Surgery Progress Note  03/23/2023         Assessment and Plan:     Dandy Sands is a 62 year old male with a PMH of SLE, antiphospholipid syndrome, CAD, CHF 2/2 ICM s/p LVAD 7/8/22, history of C.diff who presented from clinic for management of chronic driveline infection.  Dandy is now s/p I&D of driveline 3/18 with Dr. aZmora.     Cardiovascular:   S/p LVAD placement (HeartMate 3) on 7/8/22   Advanced ischemic cardiomyopathy, class IV, stage D  CAD s/p PCI to LAD (2005)  S/p CRT-D (2006)  History of MI (2007)  Severe MR  Antiphospholipid antibody syndrome on chronic warfarin  HTN, HLD  No arrhythmias overnight, HD stable, in NSR.  Most recent echo showed LV EF 15-20%  - PTA ASA 81 mg daily  - PTA Atorvastatin 40 mg daily  - PTA digoxin 125mcg  - PTA lisinopril 10 mg QD  - PTA Hydralazine 25 mg TID     Pulmonary:  Saturating well on RA  - Supplemental O2 PRN to keep sats > 92%. Wean off as tolerated.  - Pulm hygiene, IS, activity and deep breathing     Neurology / MSK:  Acute post-operative pain   Pain well controlled with current regimen:  - Acetaminophen  - PO and IV dilaudid prn  - Patient currently requiring IV Dilaudid prior to dressing changes. Was given PO oxycodone prior to wound vac placement 3/22, however did not tolerate due to pain so was given IV dilaudid.      / Renal / Fluid / Electrolytes:  Hyponatemia, stable  BL creat ~ 0.7, most recent creatinine WNL; adequate UOP.   FLUID STATUS; I/O past 24 hours: -1000 mL  - Diuresis not indicated, PTA 20 mg lasix prn   - Replete lytes per protocol  - Strict I/O, daily weights  - Avoid/limit nephrotoxins as able     GI / Nutrition:   - Reg diet  - Continue bowel regimen     Endocrine:  Stress induced hyperglycemia   Hgb A1c 5.5%, no insulin needs.     Infectious Disease:  1+ Corynebacterium striatum from OR wound cultures  WBC WNL, remains afebrile, no signs or symptoms of infection  - Completed perioperative antibiotics  - Continue to  "monitor fever curve, CBC  ID consulted after OR: plan to continue vancomycin, will review and wait for final cultures  - Wound vac placed 3/22-- significant pain requiring IV dilaudid      Hematology:   Hgb stable, Plt WNL, no signs or symptoms of active bleeding  - CBC daily     Anticoagulation:   - ASA 81 mg daiy  - Coumadin for LVAD, INR goal 2-3. Most recent INR therapeutic      MSK/Skin:  Sternal wound I&D site  LVAD Driveline exit site  - Driveline dressing changes per protocol  - chest wound I&D site: wound vac placed as discussed above     Prophylaxis:   - Stress ulcer prophylaxis: Pantoprazole 40 mg daily for 30 days  - DVT prophylaxis: Subcutaneous heparin, SCD    Disposition:  - Home with Home infusion. Has had prior PICC line training to discharge on home Vanco.   - Ordered RUE ultrasound and PICC line placement on 3/21  - Need ID final ABx recs -- patient states he would like to follow up with ID in   - Needs wound vac home order  - Planning discharge to home, hopefully Friday 3/24 if able to tolerate wound vac change without IV dilaudid       Discussed with Dr Zamora through written and verbal communication.      Cindy Durán PA-C on 3/23/2023 at 3:50 PM          Interval History:     No overnight events.  Feels well overall. No LVAD issues.  States pain is well managed on current regimen. Slept well overnight.  Tolerating diet, is passing flatus, + BM. No nausea or vomiting.  Breathing well without complaints.   Working with therapies and ambulating in halls with assistance.   Denies chest pain, palpitations, dizziness, syncopal symptoms, fevers, chills, myalgias, or sternal popping/clicking.         Physical Exam:   Blood pressure 92/44, pulse 75, temperature 97.5  F (36.4  C), temperature source Oral, resp. rate 18, height 1.702 m (5' 7\"), weight 64.5 kg (142 lb 3.2 oz), SpO2 98 %.  Vitals:    03/21/23 0704 03/22/23 0702 03/23/23 0924   Weight: 66.8 kg (147 lb 4.8 oz) " 66.4 kg (146 lb 6.4 oz) 64.5 kg (142 lb 3.2 oz)        Gen: A&Ox4, NAD  Neuro: no focal deficits   CV: LVAD humming, RRR, normal S1 S2, no murmurs, rubs or gallops.   Pulm: no gross wheezing or rhonchi, normal breathing on RA  Abd: nondistended, normal BS, soft, nontender  Ext: no peripheral edema  Incision: clean, no drainage or bleeding (5 cm x 3 cm x 2 cm deep), 2 cm induration surrounding wound.  Lines: driveline dressing clean and dry   LVAD: speed 5100, flow 3-3.6, PI 5.2-5.9, power 3.5.          Data:    Imaging:  reviewed recent imaging, no acute concerns    Labs:  BMP  Recent Labs   Lab 03/23/23  0616 03/22/23  0714 03/21/23  0625 03/20/23  0601    134* 134* 133*   POTASSIUM 4.5 4.5 4.4 4.1   CHLORIDE 104 103 102 98   ELDA 9.1 9.1 9.0 9.0   CO2 21* 21* 19* 20*   BUN 18.1 21.9 19.7 19.7   CR 0.80 0.79 0.87 0.88   GLC 91 102* 111* 98     CBC  Recent Labs   Lab 03/23/23  0616 03/22/23  0714 03/21/23  0625 03/20/23  0601   WBC 5.9 6.7 7.1 5.5   RBC 3.30* 3.27* 3.47* 3.35*   HGB 8.6* 8.5* 9.1* 8.6*   HCT 28.9* 28.5* 30.1* 28.8*   MCV 88 87 87 86   MCH 26.1* 26.0* 26.2* 25.7*   MCHC 29.8* 29.8* 30.2* 29.9*   RDW 19.9* 19.3* 19.4* 19.2*    324 313 297     INR  Recent Labs   Lab 03/23/23  0616 03/22/23  0714 03/21/23  0625 03/20/23  0601   INR 2.65* 2.45* 2.88* 3.46*      Hepatic Panel  Recent Labs   Lab 03/17/23  1405   AST 22   ALT 15   ALKPHOS 192*   BILITOTAL 0.5   ALBUMIN 3.2*     GLUCOSE:   Recent Labs   Lab 03/23/23  0616 03/22/23  0714 03/21/23  0625 03/20/23  0601 03/19/23  0737 03/18/23  1348   GLC 91 102* 111* 98 102* 79

## 2023-03-23 NOTE — PROGRESS NOTES
D: Pt presented from clinic for management of chronic driveline infection. Now s/p I&D of driveline 3/18 with Dr. Zamora.     I/A:   Neuro: A&O x4.   Cardiac: Paced rhythm in the 70s. Dopplers 70s. LVAD numbers WDL, no alarms or events.   Respiratory: Sats >90% on RA, denied SOB.   GI/: Adequate UOP. BM today. Good appetite.   Skin/drains: Wound vac in place on abdomen. Driveline dressing changed, site in tact.   IV/Drips: 1 lumen PICC in RUE   PRNs: PO Dilaudid x1, PRN Tylenol x1 and Robaxin x1, effective for pain management of wound vac site. Patient states pain is most severe during vac change, otherwise it is manageable on current pain regimen.   Activity: Stand by assist, steady gait. In bed majority of the shift. Ambulating to the bathroom.        P: Discharge to home when ready. Notifying CVTS team of changes.

## 2023-03-23 NOTE — PHARMACY-VANCOMYCIN DOSING SERVICE
Pharmacy Vancomycin Note  Date of Service 2023  Patient's  1960   62 year old, male    Indication: LVAD driveline infection  Day of Therapy: 6 (plan to continue therapy x 6 weeks 3/18/23-23)  Current vancomycin regimen:  1250 mg IV q24h  Current vancomycin monitoring method: AUC  Current vancomycin therapeutic monitoring goal: 400-600 mg*h/L    Insight[employer] Prediction of Current Vancomycin Regimen  Regimen: 1250 mg IV every 24 hours.  Start time: 09:05 on 2023  Exposure target: AUC24 (range)400-600 mg/L.hr   AUC24,ss: 424 mg/L.hr  Probability of AUC24 > 400: 68 %  Ctrough,ss: 11.8 mg/L  Probability of Ctrough,ss > 20: 0 %  Probability of nephrotoxicity (Lodise TAD ): 7 %    Current estimated CrCl = Estimated Creatinine Clearance: 91.1 mL/min (based on SCr of 0.8 mg/dL).    Creatinine for last 3 days  3/21/2023:  6:25 AM Creatinine 0.87 mg/dL  3/22/2023:  7:14 AM Creatinine 0.79 mg/dL  3/23/2023:  6:16 AM Creatinine 0.80 mg/dL    Recent Vancomycin Levels (past 3 days)  3/20/2023:  4:26 PM Vancomycin 21.3 ug/mL  3/23/2023:  6:16 AM Vancomycin 14.2 ug/mL    Vancomycin IV Administrations (past 72 hours)                   vancomycin (VANCOCIN) 1,250 mg in 0.9% NaCl 250 mL intermittent infusion (mg) 1,250 mg New Bag 23 0657     1,250 mg New Bag 23 0605                Nephrotoxins and other renal medications (From now, onward)    Start     Dose/Rate Route Frequency Ordered Stop    23 0930  vancomycin (VANCOCIN) 1,500 mg in 0.9% NaCl 250 mL intermittent infusion         1,500 mg  over 90 Minutes Intravenous EVERY 24 HOURS 23 0907      23  lisinopril (ZESTRIL) tablet 10 mg        Note to Pharmacy: PTA Sig:Take 1 tablet (10 mg) by mouth 2 times daily      10 mg Oral 2 TIMES DAILY 23               Contrast Orders - past 72 hours (72h ago, onward)    None          Interpretation of levels and current regimen:  Vancomycin level is reflective of AUC  400-600; however, on the low end of the goal range    Has serum creatinine changed greater than 50% in last 72 hours: No    Urine output:  good urine output    Renal Function: Stable    InsightRX Prediction of Planned New Vancomycin Regimen  Regimen: 1500 mg IV every 24 hours.  Start time: 09:05 on 03/23/2023  Exposure target: AUC24 (range)400-600 mg/L.hr   AUC24,ss: 503 mg/L.hr  Probability of AUC24 > 400: 97 %  Ctrough,ss: 14.1 mg/L  Probability of Ctrough,ss > 20: 2 %  Probability of nephrotoxicity (Lodise TAD 2009): 9 %      Plan:  1. Increase Dose to 1500 mg IV Q24h to ensure AUC is within goal range (probability of AUC > 400 only 68% with 1250 mg IV Q24h dose but would be 97% with small dose increase up to 1500mg IV Q24h)  2. Vancomycin monitoring method: AUC  3. Vancomycin therapeutic monitoring goal: 400-600 mg*h/L  4. Pharmacy will check vancomycin levels as appropriate in 3-5 days.  5. Serum creatinine levels will be ordered daily for the first week of therapy and at least twice weekly for subsequent weeks.    Natalie Mcpherson, PharmD, BCCP, BCPS

## 2023-03-23 NOTE — PLAN OF CARE
Hours of Care:  2300 - 0700     Temp:  [97.6  F (36.4  C)-98  F (36.7  C)] 97.8  F (36.6  C)  Pulse:  [79-85] 80  Resp:  [16-18] 18  BP: (92)/(44) 92/44  SpO2:  [98 %] 98 %        D: 62 year old male with LVAD admitted 3/17 with infection related to drive line. Pertinent history of CAD, CHF, LVAD 7/2022.      Neuro: A/Ox4.  Call light appropriate.  Able to make needs known.  Respiratory:  On room air. Denies shortness of breath at rest.  Cardiac: VSS. LVAD numbers WDL.  GI: Last BM not reported.  No report of nausea or vomiting.  : Urinating adequate amounts of clear, yellow urine  Skin:  See PCS for assessment and treatment of wounds and surgical incisions.  LDA:  Wound vac on lower sternum, 125mm HG. Low serosanguinous output.  Pain: Rates pain consistently at 6-7/10, 4mg dilaudid given at 0400 and tylenol at 0630.  Activity: independent.  Diet: Regular diet.     P: Continue to monitor status and report changes to CVTS. Plan for discharge, anticipate home care for antibiotic infusion, drive line dress and PICC by [facility].

## 2023-03-23 NOTE — CONSULTS
Received consult for chest wound s/p I&D of driveline 3/18 with Dr. Zamora, vac was changed in 3/22 by cardiothoracic team. [facility] will change the vac on 3/24, supplies ordered.   none

## 2023-03-24 LAB
ANION GAP SERPL CALCULATED.3IONS-SCNC: 12 MMOL/L (ref 7–15)
BACTERIA ABSC ANAEROBE+AEROBE CULT: ABNORMAL
BUN SERPL-MCNC: 20 MG/DL (ref 8–23)
CALCIUM SERPL-MCNC: 9.3 MG/DL (ref 8.8–10.2)
CHLORIDE SERPL-SCNC: 105 MMOL/L (ref 98–107)
CREAT SERPL-MCNC: 0.79 MG/DL (ref 0.67–1.17)
DEPRECATED HCO3 PLAS-SCNC: 20 MMOL/L (ref 22–29)
ERYTHROCYTE [DISTWIDTH] IN BLOOD BY AUTOMATED COUNT: 19.9 % (ref 10–15)
GFR SERPL CREATININE-BSD FRML MDRD: >90 ML/MIN/1.73M2
GLUCOSE SERPL-MCNC: 100 MG/DL (ref 70–99)
HCT VFR BLD AUTO: 29.5 % (ref 40–53)
HGB BLD-MCNC: 8.8 G/DL (ref 13.3–17.7)
INR PPP: 2.68 (ref 0.85–1.15)
MCH RBC QN AUTO: 25.9 PG (ref 26.5–33)
MCHC RBC AUTO-ENTMCNC: 29.8 G/DL (ref 31.5–36.5)
MCV RBC AUTO: 87 FL (ref 78–100)
PLATELET # BLD AUTO: 339 10E3/UL (ref 150–450)
POTASSIUM SERPL-SCNC: 4.2 MMOL/L (ref 3.4–5.3)
RBC # BLD AUTO: 3.4 10E6/UL (ref 4.4–5.9)
SODIUM SERPL-SCNC: 137 MMOL/L (ref 136–145)
WBC # BLD AUTO: 6.6 10E3/UL (ref 4–11)

## 2023-03-24 PROCEDURE — 250N000013 HC RX MED GY IP 250 OP 250 PS 637: Performed by: SURGERY

## 2023-03-24 PROCEDURE — 80048 BASIC METABOLIC PNL TOTAL CA: CPT | Performed by: SURGERY

## 2023-03-24 PROCEDURE — 250N000013 HC RX MED GY IP 250 OP 250 PS 637: Performed by: THORACIC SURGERY (CARDIOTHORACIC VASCULAR SURGERY)

## 2023-03-24 PROCEDURE — 250N000013 HC RX MED GY IP 250 OP 250 PS 637: Performed by: PHYSICIAN ASSISTANT

## 2023-03-24 PROCEDURE — 214N000001 HC R&B CCU UMMC

## 2023-03-24 PROCEDURE — 258N000003 HC RX IP 258 OP 636: Performed by: THORACIC SURGERY (CARDIOTHORACIC VASCULAR SURGERY)

## 2023-03-24 PROCEDURE — 250N000011 HC RX IP 250 OP 636: Performed by: THORACIC SURGERY (CARDIOTHORACIC VASCULAR SURGERY)

## 2023-03-24 PROCEDURE — 250N000011 HC RX IP 250 OP 636: Performed by: PHYSICIAN ASSISTANT

## 2023-03-24 PROCEDURE — 85027 COMPLETE CBC AUTOMATED: CPT | Performed by: SURGERY

## 2023-03-24 PROCEDURE — 85610 PROTHROMBIN TIME: CPT | Performed by: SURGERY

## 2023-03-24 PROCEDURE — 36415 COLL VENOUS BLD VENIPUNCTURE: CPT | Performed by: SURGERY

## 2023-03-24 PROCEDURE — 250N000013 HC RX MED GY IP 250 OP 250 PS 637: Performed by: STUDENT IN AN ORGANIZED HEALTH CARE EDUCATION/TRAINING PROGRAM

## 2023-03-24 RX ORDER — WARFARIN SODIUM 4 MG/1
4 TABLET ORAL
Status: COMPLETED | OUTPATIENT
Start: 2023-03-24 | End: 2023-03-24

## 2023-03-24 RX ORDER — WARFARIN SODIUM 4 MG/1
4 TABLET ORAL ONCE
Status: DISCONTINUED | OUTPATIENT
Start: 2023-03-24 | End: 2023-03-24

## 2023-03-24 RX ORDER — LIDOCAINE 4 G/G
1 PATCH TOPICAL
Status: DISCONTINUED | OUTPATIENT
Start: 2023-03-24 | End: 2023-03-26 | Stop reason: HOSPADM

## 2023-03-24 RX ADMIN — LISINOPRIL 10 MG: 10 TABLET ORAL at 20:37

## 2023-03-24 RX ADMIN — Medication 3 MG: at 01:00

## 2023-03-24 RX ADMIN — ACETAMINOPHEN 975 MG: 325 TABLET ORAL at 09:18

## 2023-03-24 RX ADMIN — DIGOXIN 125 MCG: 125 TABLET ORAL at 08:14

## 2023-03-24 RX ADMIN — HYDROXYCHLOROQUINE SULFATE 200 MG: 200 TABLET, FILM COATED ORAL at 08:15

## 2023-03-24 RX ADMIN — METHYLCELLULOSE 1000 MG: 500 TABLET ORAL at 20:37

## 2023-03-24 RX ADMIN — METHOCARBAMOL 500 MG: 500 TABLET ORAL at 09:18

## 2023-03-24 RX ADMIN — FERROUS SULFATE TAB 325 MG (65 MG ELEMENTAL FE) 325 MG: 325 (65 FE) TAB at 08:14

## 2023-03-24 RX ADMIN — HYDRALAZINE HYDROCHLORIDE 25 MG: 25 TABLET, FILM COATED ORAL at 22:22

## 2023-03-24 RX ADMIN — METHYLCELLULOSE 1000 MG: 500 TABLET ORAL at 08:15

## 2023-03-24 RX ADMIN — ATORVASTATIN CALCIUM 40 MG: 40 TABLET, FILM COATED ORAL at 08:14

## 2023-03-24 RX ADMIN — GABAPENTIN 100 MG: 100 CAPSULE ORAL at 08:14

## 2023-03-24 RX ADMIN — DIAZEPAM 2 MG: 2 TABLET ORAL at 01:00

## 2023-03-24 RX ADMIN — GABAPENTIN 100 MG: 100 CAPSULE ORAL at 13:51

## 2023-03-24 RX ADMIN — FAMOTIDINE 20 MG: 20 TABLET ORAL at 20:37

## 2023-03-24 RX ADMIN — HYDROMORPHONE HYDROCHLORIDE 2 MG: 2 TABLET ORAL at 09:18

## 2023-03-24 RX ADMIN — WARFARIN SODIUM 4 MG: 4 TABLET ORAL at 17:45

## 2023-03-24 RX ADMIN — ASPIRIN 81 MG: 81 TABLET ORAL at 08:14

## 2023-03-24 RX ADMIN — GABAPENTIN 100 MG: 100 CAPSULE ORAL at 20:37

## 2023-03-24 RX ADMIN — TAMSULOSIN HYDROCHLORIDE 0.4 MG: 0.4 CAPSULE ORAL at 08:14

## 2023-03-24 RX ADMIN — Medication 1 TABLET: at 08:14

## 2023-03-24 RX ADMIN — DIAZEPAM 2 MG: 2 TABLET ORAL at 22:22

## 2023-03-24 RX ADMIN — HYDRALAZINE HYDROCHLORIDE 25 MG: 25 TABLET, FILM COATED ORAL at 16:44

## 2023-03-24 RX ADMIN — ACETAMINOPHEN 975 MG: 325 TABLET ORAL at 13:51

## 2023-03-24 RX ADMIN — LISINOPRIL 10 MG: 10 TABLET ORAL at 08:14

## 2023-03-24 RX ADMIN — HYDROXYCHLOROQUINE SULFATE 200 MG: 200 TABLET, FILM COATED ORAL at 20:37

## 2023-03-24 RX ADMIN — Medication 3 MG: at 22:22

## 2023-03-24 RX ADMIN — VANCOMYCIN HYDROCHLORIDE 1500 MG: 10 INJECTION, POWDER, LYOPHILIZED, FOR SOLUTION INTRAVENOUS at 09:49

## 2023-03-24 RX ADMIN — Medication 5 ML: at 08:18

## 2023-03-24 RX ADMIN — HYDRALAZINE HYDROCHLORIDE 25 MG: 25 TABLET, FILM COATED ORAL at 08:15

## 2023-03-24 RX ADMIN — FAMOTIDINE 20 MG: 20 TABLET ORAL at 08:14

## 2023-03-24 ASSESSMENT — ACTIVITIES OF DAILY LIVING (ADL)
ADLS_ACUITY_SCORE: 26
ADLS_ACUITY_SCORE: 32
ADLS_ACUITY_SCORE: 26
ADLS_ACUITY_SCORE: 32
ADLS_ACUITY_SCORE: 32
ADLS_ACUITY_SCORE: 26
ADLS_ACUITY_SCORE: 26
ADLS_ACUITY_SCORE: 32

## 2023-03-24 NOTE — PROGRESS NOTES
Cardiovascular Surgery Progress Note  03/24/2023         Assessment and Plan:     Dandy Sands is a 62 year old male with a PMH of SLE, antiphospholipid syndrome, CAD, CHF 2/2 ICM s/p LVAD 7/8/22, history of C.diff who presented from clinic for management of chronic driveline infection.  Dandy is now s/p I&D of driveline 3/18 with Dr. Zamora.     Cardiovascular:   S/p LVAD placement (HeartMate 3) on 7/8/22   Advanced ischemic cardiomyopathy, class IV, stage D  CAD s/p PCI to LAD (2005)  S/p CRT-D (2006)  History of MI (2007)  Severe MR  Antiphospholipid antibody syndrome on chronic warfarin  HTN, HLD  No arrhythmias overnight, HD stable, in NSR.  Most recent echo showed LV EF 15-20%  - PTA ASA 81 mg daily  - PTA Atorvastatin 40 mg daily  - PTA digoxin 125mcg  - PTA lisinopril 10 mg QD  - PTA Hydralazine 25 mg TID     Pulmonary:  Saturating well on RA  - Supplemental O2 PRN to keep sats > 92%. Wean off as tolerated.  - Pulm hygiene, IS, activity and deep breathing     Neurology / MSK:  Acute post-operative pain   Pain well controlled with current regimen:  - Acetaminophen  - Robaxin  - lidocaine patches, heat, ice  - PO dilaudid PRN      / Renal / Fluid / Electrolytes:  Hyponatemia, stable  BL creat ~ 0.7, most recent creatinine WNL; adequate UOP.   FLUID STATUS; I/O past 24 hours: -135 mL  - Diuresis not indicated, PTA 20 mg lasix prn   - Replete lytes per protocol  - Strict I/O, daily weights  - Avoid/limit nephrotoxins as able     GI / Nutrition:   - Reg diet  - Continue bowel regimen     Endocrine:  Stress induced hyperglycemia   Hgb A1c 5.5%, no insulin needs.     Infectious Disease:  1+ Corynebacterium striatum from OR wound cultures  WBC WNL, remains afebrile, no signs or symptoms of infection  - Completed perioperative antibiotics  - Continue to monitor fever curve, CBC  ID consulted after OR: plan to continue vancomycin  - Wound vac placed 3/22-- significant pain requiring IV dilaudid   - WOC RN  recommended discontinuing wound vac due to superficiality of the wound and significant pain with wound vac change. Discussed with Dr. Zamora -- OK to discontinue wound vac and dress with aquacel Ag  - patient will have wound care in home at discharge   - IV vancomycin ordered for discharge  - Patient will follow up with ID at [place] in 2 weeks after discharge  - Per ID Weekly labs required: CBC with diff, CMP, CRP and Vancomycin level. Dr. Steve Saravia ([facility] ID) to follow labs.      Hematology:   Hgb stable, Plt WNL, no signs or symptoms of active bleeding  - CBC daily     Anticoagulation:   - ASA 81 mg daiy  - Coumadin for LVAD, INR goal 2-3. Most recent INR therapeutic      MSK/Skin:  Sternal wound I&D site  LVAD Driveline exit site  - Driveline dressing changes per protocol  - chest wound I&D site: wound care discussed above     Prophylaxis:   - Stress ulcer prophylaxis: Pantoprazole 40 mg daily for 30 days  - DVT prophylaxis: Subcutaneous heparin, SCD    Disposition:  - Home with Home infusion. Has had prior PICC line training to discharge on home Vanco.   - Ordered RUE ultrasound and PICC line placement on 3/21  - Patient will follow up with ID in [place] ([place])  - Medically ready for discharge 3/24 but patient does not have a ride back to [place] -- will plan to discharge tomorrow 3/25      Discussed with Dr Noah Durán PA-C on 3/24/2023 at 4:29 PM          Interval History:     No overnight events.  Feels well overall. No LVAD issues.  States pain is well managed on current regimen. Slept well overnight.  Tolerating diet, is passing flatus, + BM. No nausea or vomiting.  Breathing well without complaints.   Working with therapies and ambulating in halls with assistance.   Denies chest pain, palpitations, dizziness, syncopal symptoms, fevers, chills, myalgias, or sternal popping/clicking.         Physical Exam:   Blood pressure 96/77, pulse 78, temperature 97.5  " F (36.4  C), temperature source Oral, resp. rate 18, height 1.702 m (5' 7\"), weight 64.4 kg (142 lb), SpO2 99 %.  Vitals:    03/22/23 0702 03/23/23 0924 03/24/23 0347   Weight: 66.4 kg (146 lb 6.4 oz) 64.5 kg (142 lb 3.2 oz) 64.4 kg (142 lb)        Gen: A&Ox4, NAD  Neuro: no focal deficits   CV: LVAD humming, RRR, normal S1 S2, no murmurs, rubs or gallops.   Pulm: no gross wheezing or rhonchi, normal breathing on RA  Abd: nondistended, normal BS, soft, nontender  Ext: no peripheral edema  Incision: clean, no drainage or bleeding (5 cm x 3 cm x 2 cm deep), 2 cm induration surrounding wound.  Lines: driveline dressing clean and dry   LVAD: speed 5100, flow 3.3-3.7, PI 3.5-6.5, power 3.5-3.6.          Data:    Imaging:  reviewed recent imaging, no acute concerns    Labs:  BMP  Recent Labs   Lab 03/24/23  0746 03/23/23  0616 03/22/23  0714 03/21/23  0625    136 134* 134*   POTASSIUM 4.2 4.5 4.5 4.4   CHLORIDE 105 104 103 102   ELDA 9.3 9.1 9.1 9.0   CO2 20* 21* 21* 19*   BUN 20.0 18.1 21.9 19.7   CR 0.79 0.80 0.79 0.87   * 91 102* 111*     CBC  Recent Labs   Lab 03/24/23  0746 03/23/23  0616 03/22/23  0714 03/21/23  0625   WBC 6.6 5.9 6.7 7.1   RBC 3.40* 3.30* 3.27* 3.47*   HGB 8.8* 8.6* 8.5* 9.1*   HCT 29.5* 28.9* 28.5* 30.1*   MCV 87 88 87 87   MCH 25.9* 26.1* 26.0* 26.2*   MCHC 29.8* 29.8* 29.8* 30.2*   RDW 19.9* 19.9* 19.3* 19.4*    334 324 313     INR  Recent Labs   Lab 03/24/23  0746 03/23/23  0616 03/22/23  0714 03/21/23  0625   INR 2.68* 2.65* 2.45* 2.88*      Hepatic Panel  No lab results found in last 7 days.  GLUCOSE:   Recent Labs   Lab 03/24/23  0746 03/23/23  0616 03/22/23  0714 03/21/23  0625 03/20/23  0601 03/19/23  0737   * 91 102* 111* 98 102*       "

## 2023-03-24 NOTE — CONSULTS
[facility]  WO Nurse Inpatient Assessment     Consulted for: Chest wound vac change    Patient History (according to provider note(s):      Dandy Sands is a 62 year old male with a PMH of SLE, antiphospholipid syndrome, CAD, CHF 2/2 ICM s/p LVAD 7/8/22, history of C.diff who presented from clinic for management of chronic driveline infection.  Dandy is now s/p I&D of driveline 3/18 with Dr. Zamora.    Areas Assessed:      Areas visualized during today's visit: Anterior surfaces    Wound location: Mid lower chest        Last photo: 3/24  Wound due to: Surgical Wound  Wound history/plan of care: see above  Wound base: 100 % granulation tissue,      Palpation of the wound bed: normal      Drainage: small     Description of drainage: serosanguinous     Measurements (length x width x depth, in cm): 5.7  x 2.6  x  1.2 cm      Tunneling: N/A     Undermining: N/A  Periwound skin: Intact      Color: normal and consistent with surrounding tissue      Temperature: normal   Odor: none  Pain: severe, tension to hands, feet and body, facial expression of distress and during dressing change, constant  Pain interventions prior to dressing change: oral dilaudid, soaking, slow and gentle cares  and distraction  Treatment goal: Heal , Decrease moisture and Protection  STATUS: initial assessment  Supplies ordered: ordered Aquacel AG, discussed with RN and discussed with patient and provider- recommended to discontinue the vac due to significant pain with NPWT and initiated new POC see below. Updated RNCC as well.     Treatment Plan:     Chest wound(s): Monday/Wednesday/Friday   Cleanse wound bed with Microklenz and pat dry.  Cut a piece of Aquacel AG (#[id number]) to fit into the wound bed and place it. Ensure to cover entire wound bed.  Cover with Mepilex border.      Orders: Written    RECOMMEND PRIMARY TEAM ORDER: None, at this time  Education provided: plan of care, wound progress and  Moisture management  Discussed plan of care with: Patient, Nurse, Physicians Assistant and RNCC  [facility] nurse follow-up plan: weekly  Notify [facility] if wound(s) deteriorate.  Nursing to notify the Provider(s) and re-consult the [facility] Nurse if new skin concern.    DATA:     Current support surface: Standard  Standard gel/foam mattress (IsoFlex, Atmos air, etc)  Containment of urine/stool: Continent of bladder and Continent of bowel  BMI: Body mass index is 22.24 kg/m .   Active diet order: Orders Placed This Encounter      Regular Diet Adult     Output: I/O last 3 completed shifts:  In: 1480 [P.O.:1480]  Out: 1550 [Urine:1550]     Labs: Recent Labs   Lab 03/24/23  0746 03/18/23  0706 03/17/23  1405   ALBUMIN  --   --  3.2*   HGB 8.8*   < > 7.8*   INR 2.68*   < > 2.66*   WBC 6.6   < > 7.9    < > = values in this interval not displayed.     Pressure injury risk assessment:   Sensory Perception: 4-->no impairment  Moisture: 4-->rarely moist  Activity: 3-->walks occasionally  Mobility: 3-->slightly limited  Nutrition: 3-->adequate  Friction and Shear: 3-->no apparent problem  Case Score: 20    Caren Clemente, RN   Pager no longer is use, please contact through [employer] group: [facility] Nurse  Dept. Office Number: [phone]

## 2023-03-24 NOTE — PROGRESS NOTES
"Prolonged Parenteral/Oral Antibiotic Recommendations and ID Follow up  This template provides final ID recommendations as of this date.     Infectious Diseases Indication: Corynebacterium striatum LVAD driveline infection with abscess s/p I&D on 3/18/23    Antibiotic Information  Name of Antibiotic Dose of Antibiotic1 Anticipated duration Effective start date2 End date   vancomycin 1500mg IV q24hrs 6 weeks 3/18/23 4/29/23                 1.Dose of antibiotic will need to be renally adjusted if creatinine clearance changes  2.Effective start date is the date of adequate therapy with appropriate spectrum    Method of antibiotic delivery:PICC line.Is the line being used for another indication besides antimicrobials? No At the end of therapy should the line used for antimicrobials be removed or de-accessed? Yes. Selecting \"yes\" will function as written order to remove PICC line or de-access the indwelling line at the end of therapy.    Weekly labs required: CBC with diff, CMP, CRP and Vancomycin level. Dr. Steve Saravia ([facility] ID) to follow labs.     Are there pending microbial tests: Yes Cultures - fungal and anaerobic cultures from I&D    Imaging for ID follow up: ID Imaging: No.     ID Follow up: Patient will need follow up in 2 weeks and again at end of treatment ~4/29/23 for transition to minocycline suppression therapy. Prefers to follow up closer to home at [facility] in [place], [place].   - [facility] ID Provider: Dr. Steve Saravia    - clinic number [phone]      3/18/23 I&D Aerobic culture results:    "

## 2023-03-24 NOTE — PROGRESS NOTES
CLINICAL NUTRITION SERVICES    Reviewed nutrition risk factors due to LOS. Pt is tolerating regular diet, has 100% intake, ordering 2-3 meals/day per nursing documentation. No nutrition issues identified at this time. Weight stable this admission (65.6 kg 3/20 and 64.4 kg 3/24), suspect 3/19 weight of 70.6 kg related to bed scale inaccuracy. No concerns for weight loss at this time. RD will follow via rounds at this time, unless consulted.     INTERVENTIONS:  Implementation:  None at present    Follow Up / Monitoring:   Will continue to follow pt via rounds.     Paty Finley Dietitian Eligible  6C RD pager: [phone]  Weekend/Holiday RD pager: [phone]

## 2023-03-24 NOTE — PHARMACY-ANTICOAGULATION SERVICE
Clinical Pharmacy- Warfarin Discharge Note  This patient is currently on warfarin for the treatment of LVAD.  INR Goal= 2-3  Expected length of therapy lifetime.  Warfarin PTA Regimen: Verified with patien taking 5mg Tue and Thur and 7.5mg all other days of the week    Anticoagulation Dose History     Recent Dosing and Labs Latest Ref Rng & Units 3/18/2023 3/19/2023 3/20/2023 3/21/2023 3/22/2023 3/23/2023 3/24/2023    Warfarin 2.5 mg - - 2.5 mg 2.5 mg - - - -    Warfarin 4 mg - - - - - - 4 mg -    Warfarin 5 mg - 5 mg - - 5 mg 5 mg - -    [id number] 70 - 130 % - - - - - - -    INR 0.85 - 1.15 2.99(H) 3.59(H) 3.46(H) 2.88(H) 2.45(H) 2.65(H) 2.68(H)        Recommend discharging the patient on a warfarin regimen of the following:   Friday 3/24: 4 mg today while inpatient, prior to discharge    Saturday 3/25: 5 mg, using home supply of 2.5 mg tablets.   Asif 3/26: 5 mg     The patient should have an INR checked Monday, March 27th, 2023.    Agnieszka Cool, PharmD Candidate

## 2023-03-24 NOTE — PLAN OF CARE
D: Admitted s/p chronic driveline infection  PMH of SLE, antiphospholipid syndrome, CAD, CHF 2/2 ICM s/p LVAD 7/8/22, C-diff     I: Monitored vitals and assessed pt status.     Changed: LVAD Dressing  PRN: Tylenol, Dilaudid     A: A0x4. Afebrile. VSS. HRs 80s. Sinus rhythm. Sats >92% on RA. Pt denies any shortness of breath, chest pain/palpitations, nausea, dizziness, or chills. Wound vac removed on sternal site. Wound packed with Aquacel and covered with mepilex, due to be changed on 3/27.  HM3 LVAD. Numbers WNL. LVAD dressing changed, pt uses betadine. Pt on regular diet.  Pt up w/assist x1.      P: Discharge pending medical stability. Pt states that he does not have a ride until Asif 3/26. Continue to monitor pt status and notify CVTS treatment team with any change or concerns.        Goal Outcome Evaluation:     Plan of Care Reviewed With: patient    Overall Patient Progress: no change

## 2023-03-24 NOTE — PLAN OF CARE
Dandy Sands is a 62 year old male with a PMH of SLE, antiphospholipid syndrome, CAD, CHF 2/2 ICM s/p LVAD 7/8/22, history of C.diff who presented from clinic for management of chronic driveline infection.  Dandy is now s/p I&D of driveline 3/18    Temp: 97.4  F (36.3  C) Temp src: Oral BP: (!) 85/55 Pulse: 98   Resp: 18 SpO2: 98 % O2 Device: None (Room air)         Neuro: AOx4  Cardiac: Paced w/LVAD HM III  Resp: RA  GI/: Voids spontaneously to bedside urinal, BM previous shift  Diet: Regular  LDA's: R SL PICC  Endo: No BS checks  Pain: mostly R/T wound vac changes  Activity: SBA  Changes: none to note  Plan: Possible discharge if Pt can handle wound vac change without need for dilaudid. Continue to monitor and contact CVTS with any changes/concerns

## 2023-03-24 NOTE — PHARMACY
[facility]  Parenteral ANtibiotic Review at Departure from Acute Care Collaborative Note     Patient: Dandy Sands  MRN: [id number]  Allergies: Patient has no known allergies.    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 No  Pending Cultures/Microbiological Tests: no      Inpatient ID involved in developing OPAT plan: Yes - discharge OPAT plan has no changes from ID provider, Dr. Grazyna Millan, OPAT plan charted on 3/24/2023    Outpatient ID Follow-up: Referred to outside ID provider for follow-up  Designated Provider: Steve Wu ([facility]) Clinic number [phone]    Antimicrobial Regimen / Route Anticipated  Duration Start Date Stop /  Reassess Date   Vancomycin IV 1500 mg every 24 hours/IV 6 weeks, minocycline suppression to start after 3/18/2023 4/29/2029     Laboratory Tests and Monitoring Frequency: CBC with Diff, CMP, CRP Once Weekly    Therapeutic Drug Monitoring: Vancomycin   - Drug: Vancomycin   - Goal(s): -600   - Level(s) last checked date: 3/23/23    Imaging/Miscellaneous Monitoring: No    ID Pharmacist Interventions: None                          DONAVAN LUGO RPH  Pager: [phone]

## 2023-03-24 NOTE — PROGRESS NOTES
Care Coordinator  D/I: to discharge to home on Saturday, 2/25 by 12 noon  Daughter Asha to come pick him up.  Lu FABIAN called her approx 3pm and gave her a full update.        Home wound vac HAS been APPROVED: Per Caren ROCHE RN pt CANNOT tolerate it(Sternal wound which IVAB are for) and will be taken off. I sent an email to [facility] to have them cancel the order on 3/25/23.    ----and will do: MWF Aquacel AG with Mepilex dressing  [facility] RN to begin Monday, 3/27/23 needs INR Monday with results to [facility] coumadin clinic:  Ph: [phone]  Fax: [phone]    [facility]: [place], [place]  Ph: [phone] Lin has been updated today  Fax: [phone]  --script for IV Vanco has been fax'd today  RUI Holt--has called in verbal orders for labs/vanco levels and goal and PICC flush supplies.  [facility] CLOSED on WEEKEND so will send his IVAB/PICC supplies to: family to   [facility]   [facility] [address]  [address]   [phone]  ----[facility] will send his IVAB/PICC supplies her and FAMILY MUST  on Saturday 3/25 by 6pm @  [facility] aware per Gregorio HANLEY   [address] [place] [address]   Main Phone:[phone]    3/21/23 SL PICC placed--dressing change due 3/28/23       Q week labs/vanco levels--to go to [facility]: Dr Luis Saravia  Ph: [phone]  Fax; [phone]  ----Pt needs to follow up in 2 weeks______I tried to call at 3pm and their office is closed: Pt can call on Monday 3/27 to schedule____.        6C RN to send home 1 week supply for dressings(sternal wound) then [facility] PER[facility]O to provide.    LVAD dressings--Pt/family are independent with this dressing change and have supplies. OP RN CC: Chantal Brasher--family has her phone #.  IF driveline site needs to be looked at for some reason--the [facility] PER[facility] RN should be alen to do this, as the [facility] HARDIN MD refuses.: Pt is managed at [facility] for LVAD--driveline issues.      Pt  has been placed on the WEEKEND RN CC list--they are to fax to multiple parties above.    Will follow.

## 2023-03-24 NOTE — DISCHARGE INSTRUCTIONS
AFTER YOU GO HOME FROM YOUR HEART SURGERY  ([facility] chronic wound infection, s/p I&D 3/17/23)    Avoid strenuous activities such as bowling, vacuuming, raking, shoveling, golf or tennis for 2 weeks after your surgery. It is okay to resume sex if you feel comfortable in doing so. You may have to try different positions with your partner.  Splint your chest incision by hugging a pillow or bringing your arms across your chest when coughing or sneezing. No driving if taking narcotics for pain.    Chest wound(s): Monday/Wednesday/Friday   Cleanse wound bed with Microklenz and pat dry.  Cut a piece of Aquacel AG to fit into the wound bed and place it. Ensure to cover entire wound bed.  Cover with Mepilex border.    Watch for signs of infection: increased redness, tenderness, warmth or any drainage from sternum incision.  Also a temperature > 100.5 F or chills. Call your surgeon or primary care provider's office immediately. Remove any skin glue left on incisions after 10-14 days. This will not affect your incision and can speed up healing.    Exercise is very important in your recovery. Please follow the guidelines set up for you in your cardiac rehab classes at the hospital. If outpatient cardiac rehab was ordered for you, we highly recommend you participate. If you have problems arranging your cardiac rehab, please call [phone] for all locations, with the exception of [place], please call [phone] and [facility], please call [phone].    Avoid sitting for prolonged periods of time, try to walk every hour during the day. If you have a leg incision, elevate your leg often when you are not walking.    Check your weight when you get home from the hospital and continue to check it daily through your recovery for at least a month. If you notice a weight gain of 2-3 pounds in a week, notify your primary care physician, cardiologist or surgeon.    Bowel activity may be slow after surgery. If necessary,  you may take an over the counter laxative such as Milk of Magnesia or Miralax. You may have stool softeners prescribed (docusate sodium, Senokot). We recommend using stool softeners while using narcotics for pain (oxycodone/percocet, hydrocodone/vicodin, hydromorphone/dilaudid).      Wean OFF of narcotics (oxycodone, dilaudid, hydrocodone) as soon as possible. You should continue taking acetaminophen as long as you have any surgical pain as the first choice for pain control and add narcotics as necessary for pain to be tolerable.      DO NOT SMOKE.  IF YOU NEED HELP QUITTING, PLEASE TALK WITH YOUR CARDIOLOGIST OR PRIMARY DOCTOR.    You are on a blood thinner, follow the instructions you were given in the hospital and DO NOT SKIP this medication. Try and take it the same time everyday. Your primary care physician or coumadin clinic will manage the dosing. INR goal is 2-3.    You have an LVAD device, so call your LVAD coordinator with all questions and concerns.  No swimming, showering, or baths. Daily sterile driveline dressing changes as instructed. You cannot have a MRI with your LVAD in place. No contact sports.     REGARDING PRESCRIPTION REFILLS.  If you need a refill on your pain medication contact us to discuss your pain and a possible one time refill.   All other medications will be adjusted, discontinued and re-filled by your primary care physician and/or your cardiologist as they were prior to your surgery. We have given you enough for one to three month with possibly one refill.    POST-OPERATIVE CLINIC VISITS  You will now return to the care of your primary care provider and your Cardiology care team.  Your LVAD coordinator will assist with your care and can handle all questions, including follow up appointments, lab draws, and other needs.    You will need to arrange for an INR check on 3/27 or 3/28.     Plan will be to complete 6 weeks of IV vancomycin (3/18/23-4/29/23) followed by return of oral chronic  suppression ->  minocycline 100 mg po q 12h  Per ID Weekly labs required: CBC with diff, CMP, CRP and Vancomycin level. Dr. Steve Saravia ([facility]) to follow labs, clinic number [phone]  Patient will need follow up in 2 weeks and again at end of treatment ~4/29/23 for transition to minocycline suppression therapy. Prefers to follow up closer to home at [facility] in [place], [place].              - [facility] Provider: Dr. Steve Saravia               - clinic number [phone]    You should see your primary care provider in 2-4 weeks after discharge.   It is important to see your cardiologist as recommended.       If you do not hear from a  in 7 days, please call [phone] (choose option 1) and request to be seen with a general cardiologist or someone that you have seen in the past.   If there is a need to return to see CT Surgery, please call our  [facility] at [phone].     SURGICAL QUESTIONS  Please call Ashutosh Garza, Franny Mendes, Flory Leon, or Layne Garcia with surgical recovery and medication questions; phone numbers are listed below.  They will assist you with your needs and contact other surgery care team members as indicated.    On weekends or after hours, please call [phone] and ask the  to page the Cardiothoracic Surgery fellow on call.      Thank you,    Your Cardiothoracic Surgery Team   Ashutosh Gazra RN Care Coordinator - [phone]  Franny Mendes RN Care Coordinator - [phone]   Layne Shabazz, RN Care Coordinator - [phone]   Verna Leon, LITA Care Coordinator - [phone]

## 2023-03-25 LAB
ANION GAP SERPL CALCULATED.3IONS-SCNC: 12 MMOL/L (ref 7–15)
BACTERIA ABSC ANAEROBE+AEROBE CULT: NORMAL
BUN SERPL-MCNC: 21.7 MG/DL (ref 8–23)
CALCIUM SERPL-MCNC: 9.4 MG/DL (ref 8.8–10.2)
CHLORIDE SERPL-SCNC: 106 MMOL/L (ref 98–107)
CREAT SERPL-MCNC: 0.8 MG/DL (ref 0.67–1.17)
DEPRECATED HCO3 PLAS-SCNC: 20 MMOL/L (ref 22–29)
ERYTHROCYTE [DISTWIDTH] IN BLOOD BY AUTOMATED COUNT: 19.9 % (ref 10–15)
GFR SERPL CREATININE-BSD FRML MDRD: >90 ML/MIN/1.73M2
GLUCOSE SERPL-MCNC: 109 MG/DL (ref 70–99)
HCT VFR BLD AUTO: 29.8 % (ref 40–53)
HGB BLD-MCNC: 8.9 G/DL (ref 13.3–17.7)
INR PPP: 2.55 (ref 0.85–1.15)
MCH RBC QN AUTO: 26.2 PG (ref 26.5–33)
MCHC RBC AUTO-ENTMCNC: 29.9 G/DL (ref 31.5–36.5)
MCV RBC AUTO: 88 FL (ref 78–100)
PLATELET # BLD AUTO: 349 10E3/UL (ref 150–450)
POTASSIUM SERPL-SCNC: 4.2 MMOL/L (ref 3.4–5.3)
RBC # BLD AUTO: 3.4 10E6/UL (ref 4.4–5.9)
SODIUM SERPL-SCNC: 138 MMOL/L (ref 136–145)
WBC # BLD AUTO: 7.2 10E3/UL (ref 4–11)

## 2023-03-25 PROCEDURE — 250N000013 HC RX MED GY IP 250 OP 250 PS 637: Performed by: THORACIC SURGERY (CARDIOTHORACIC VASCULAR SURGERY)

## 2023-03-25 PROCEDURE — 250N000013 HC RX MED GY IP 250 OP 250 PS 637: Performed by: SURGERY

## 2023-03-25 PROCEDURE — 36415 COLL VENOUS BLD VENIPUNCTURE: CPT | Performed by: SURGERY

## 2023-03-25 PROCEDURE — 250N000011 HC RX IP 250 OP 636: Performed by: PHYSICIAN ASSISTANT

## 2023-03-25 PROCEDURE — 258N000003 HC RX IP 258 OP 636: Performed by: THORACIC SURGERY (CARDIOTHORACIC VASCULAR SURGERY)

## 2023-03-25 PROCEDURE — 250N000013 HC RX MED GY IP 250 OP 250 PS 637: Performed by: PHYSICIAN ASSISTANT

## 2023-03-25 PROCEDURE — 80048 BASIC METABOLIC PNL TOTAL CA: CPT | Performed by: SURGERY

## 2023-03-25 PROCEDURE — 250N000011 HC RX IP 250 OP 636: Performed by: THORACIC SURGERY (CARDIOTHORACIC VASCULAR SURGERY)

## 2023-03-25 PROCEDURE — 214N000001 HC R&B CCU UMMC

## 2023-03-25 PROCEDURE — 250N000013 HC RX MED GY IP 250 OP 250 PS 637: Performed by: STUDENT IN AN ORGANIZED HEALTH CARE EDUCATION/TRAINING PROGRAM

## 2023-03-25 PROCEDURE — 85610 PROTHROMBIN TIME: CPT | Performed by: SURGERY

## 2023-03-25 PROCEDURE — 85027 COMPLETE CBC AUTOMATED: CPT | Performed by: SURGERY

## 2023-03-25 RX ORDER — WARFARIN SODIUM 4 MG/1
4 TABLET ORAL
Status: COMPLETED | OUTPATIENT
Start: 2023-03-25 | End: 2023-03-25

## 2023-03-25 RX ADMIN — LISINOPRIL 10 MG: 10 TABLET ORAL at 07:57

## 2023-03-25 RX ADMIN — DIGOXIN 125 MCG: 125 TABLET ORAL at 07:56

## 2023-03-25 RX ADMIN — HYDRALAZINE HYDROCHLORIDE 25 MG: 25 TABLET, FILM COATED ORAL at 07:56

## 2023-03-25 RX ADMIN — ATORVASTATIN CALCIUM 40 MG: 40 TABLET, FILM COATED ORAL at 07:56

## 2023-03-25 RX ADMIN — TAMSULOSIN HYDROCHLORIDE 0.4 MG: 0.4 CAPSULE ORAL at 07:57

## 2023-03-25 RX ADMIN — Medication 5 ML: at 20:15

## 2023-03-25 RX ADMIN — WARFARIN SODIUM 4 MG: 4 TABLET ORAL at 17:45

## 2023-03-25 RX ADMIN — HYDRALAZINE HYDROCHLORIDE 25 MG: 25 TABLET, FILM COATED ORAL at 16:06

## 2023-03-25 RX ADMIN — LISINOPRIL 10 MG: 10 TABLET ORAL at 20:08

## 2023-03-25 RX ADMIN — DIAZEPAM 2 MG: 2 TABLET ORAL at 22:11

## 2023-03-25 RX ADMIN — HYDRALAZINE HYDROCHLORIDE 25 MG: 25 TABLET, FILM COATED ORAL at 22:11

## 2023-03-25 RX ADMIN — GABAPENTIN 100 MG: 100 CAPSULE ORAL at 20:08

## 2023-03-25 RX ADMIN — METHYLCELLULOSE 1000 MG: 500 TABLET ORAL at 07:57

## 2023-03-25 RX ADMIN — GABAPENTIN 100 MG: 100 CAPSULE ORAL at 14:42

## 2023-03-25 RX ADMIN — Medication 3 MG: at 22:11

## 2023-03-25 RX ADMIN — VANCOMYCIN HYDROCHLORIDE 1500 MG: 10 INJECTION, POWDER, LYOPHILIZED, FOR SOLUTION INTRAVENOUS at 10:15

## 2023-03-25 RX ADMIN — Medication 1 TABLET: at 07:57

## 2023-03-25 RX ADMIN — HYDROXYCHLOROQUINE SULFATE 200 MG: 200 TABLET, FILM COATED ORAL at 07:57

## 2023-03-25 RX ADMIN — GABAPENTIN 100 MG: 100 CAPSULE ORAL at 07:57

## 2023-03-25 RX ADMIN — FAMOTIDINE 20 MG: 20 TABLET ORAL at 20:08

## 2023-03-25 RX ADMIN — ASPIRIN 81 MG: 81 TABLET ORAL at 07:57

## 2023-03-25 RX ADMIN — FERROUS SULFATE TAB 325 MG (65 MG ELEMENTAL FE) 325 MG: 325 (65 FE) TAB at 07:57

## 2023-03-25 RX ADMIN — FAMOTIDINE 20 MG: 20 TABLET ORAL at 07:56

## 2023-03-25 RX ADMIN — HYDROXYCHLOROQUINE SULFATE 200 MG: 200 TABLET, FILM COATED ORAL at 20:08

## 2023-03-25 RX ADMIN — METHYLCELLULOSE 1000 MG: 500 TABLET ORAL at 20:09

## 2023-03-25 ASSESSMENT — ACTIVITIES OF DAILY LIVING (ADL)
ADLS_ACUITY_SCORE: 28
ADLS_ACUITY_SCORE: 28
ADLS_ACUITY_SCORE: 32
ADLS_ACUITY_SCORE: 28
ADLS_ACUITY_SCORE: 32
ADLS_ACUITY_SCORE: 28
ADLS_ACUITY_SCORE: 28
ADLS_ACUITY_SCORE: 32
ADLS_ACUITY_SCORE: 28

## 2023-03-25 NOTE — PLAN OF CARE
"D: chronic driveline infection, s/p I & D (3/18)    I: Monitored vitals and assessed pt status.   Changed:   Running:   PRN: Ativan x1 before bed    A: A/o x4, VSS. Pressures stable w/ automatic cuff. LVAD HM III WNL, sinus w/ BBB and 1st degree AV block. RA. Voiding in urinal. Last BM 3/23. Driveline dressing CDI, sternal dressing CDI. RSL PICC saline locked. X1, SBA.     Vitals: BP (!) 85/70 (BP Location: Right arm)   Pulse 78   Temp 98.2  F (36.8  C) (Oral)   Resp 18   Ht 1.702 m (5' 7\")   Wt 63.6 kg (140 lb 4.8 oz)   SpO2 99%   BMI 21.97 kg/m       P: Continue to monitor Pt status and report any significant changes to treatment team. Awaiting ride until Sunday.     "

## 2023-03-25 NOTE — PROGRESS NOTES
Cardiovascular Surgery Progress Note  03/25/2023         Assessment and Plan:     Dandy Sands is a 62 year old male with a PMH of SLE, antiphospholipid syndrome, CAD, CHF 2/2 ICM s/p LVAD 7/8/22, history of C.diff who presented from clinic for management of chronic driveline infection.  Dandy is now s/p I&D of driveline 3/18 with Dr. Zamora.     Cardiovascular:   S/p LVAD placement (HeartMate 3) on 7/8/22   Advanced ischemic cardiomyopathy, class IV, stage D  CAD s/p PCI to LAD (2005)  S/p CRT-D (2006)  History of MI (2007)  Severe MR  Antiphospholipid antibody syndrome on chronic warfarin  HTN, HLD  No arrhythmias overnight, HD stable, in NSR.  Most recent echo showed LV EF 15-20%  - PTA ASA 81 mg daily  - PTA Atorvastatin 40 mg daily  - PTA digoxin 125mcg  - PTA lisinopril 10 mg QD  - PTA Hydralazine 25 mg TID     Pulmonary:  Saturating well on RA  - Supplemental O2 PRN to keep sats > 92%. Wean off as tolerated.  - Pulm hygiene, IS, activity and deep breathing     Neurology / MSK:  Acute post-operative pain   Pain well controlled with current regimen:  - Acetaminophen  - Robaxin  - lidocaine patches, heat, ice  - PO dilaudid PRN      / Renal / Fluid / Electrolytes:  Hyponatemia, stable  BL creat ~ 0.7, most recent creatinine WNL; adequate UOP.   FLUID STATUS; I/O past 24 hours: -135 mL  - Diuresis not indicated, PTA 20 mg lasix prn   - Replete lytes per protocol  - Strict I/O, daily weights  - Avoid/limit nephrotoxins as able     GI / Nutrition:   - Reg diet  - Continue bowel regimen     Endocrine:  Stress induced hyperglycemia   Hgb A1c 5.5%, no insulin needs.     Infectious Disease:  1+ Corynebacterium striatum from OR wound cultures  WBC WNL, remains afebrile, no signs or symptoms of infection  - Completed perioperative antibiotics  - Continue to monitor fever curve, CBC  ID consulted after OR: plan to continue vancomycin  - Wound vac placed 3/22-- significant pain requiring IV dilaudid   - WOC RN  recommended discontinuing wound vac due to superficiality of the wound and significant pain with wound vac change. Discussed with Dr. Zamora -- OK to discontinue wound vac and dress with aquacel Ag.  - Patient will have wound care in home at discharge.   - IV vancomycin ordered for discharge.  - Patient will follow up with ID at [place] in 2 weeks after discharge  - Per ID Weekly labs required: CBC with diff, CMP, CRP and Vancomycin level. Dr. Steve Saravia ([facility]) to follow labs, clinic number [phone]     Hematology:   Hgb stable, Plt WNL, no signs or symptoms of active bleeding  - CBC daily     Anticoagulation:   - ASA 81 mg daiy  - Coumadin for LVAD, INR goal 2-3. Most recent INR therapeutic      MSK/Skin:  Sternal wound I&D site  LVAD Driveline exit site  - Driveline dressing changes per protocol  - chest wound I&D site: wound care discussed above     Prophylaxis:   - Stress ulcer prophylaxis: Pantoprazole 40 mg daily for 30 days  - DVT prophylaxis: Subcutaneous heparin, SCD     Disposition:  - Home with Home infusion. Has had prior PICC line training to discharge on home Vanco.   - Ordered RUE ultrasound and PICC line placement on 3/21  - Patient will follow up with ID in [place] ([place])  - Medically ready for discharge since 3/24 but patient did not have a ride back to [place] -- plan to discharge tomorrow 3/26.    Discussed with Dr Quispe through written communication.      Ld Espinoza PA-C  Cardiothoracic Surgery  Pager [phone]    7:33 AM   March 25, 2023        Interval History:     No overnight events. Planning Discharge this AM.   States pain is well managed on current regimen. Slept well overnight.  Tolerating diet, is passing flatus, + BM. No nausea or vomiting.  Breathing well without complaints.   Working with therapies and ambulating in halls without assistance.   Denies chest pain, palpitations, dizziness, syncopal symptoms, fevers, chills, myalgias, or  "sternal popping/clicking.         Physical Exam:   Blood pressure (!) 85/70, pulse 78, temperature 98.2  F (36.8  C), temperature source Oral, resp. rate 18, height 1.702 m (5' 7\"), weight 63.6 kg (140 lb 4.8 oz), SpO2 99 %.  Vitals:    03/23/23 0924 03/24/23 0347 03/25/23 0600   Weight: 64.5 kg (142 lb 3.2 oz) 64.4 kg (142 lb) 63.6 kg (140 lb 4.8 oz)      Weight; - 9 lbs since admit and trending down.   24 hr Fluid status; net loss 1.2 L. UOP 1.7 L  MAPs: 76 - 84     Gen: A&Ox4, NAD  Neuro: no focal deficits   CV: LVAD humming, RRR, normal S1 S2, no murmurs, rubs or gallops.   Pulm: CTA, no wheezing or rhonchi, normal breathing on RA  Abd: nondistended, normal BS, soft, nontender  Ext: no peripheral edema  Incision: open wound with blue packing and mepilex cover.  Tubes/drain sites: dressing clean and dry  Lines: driveline dressing clean and dry   LVAD: speed 5100, flow 3.3 - 3.8, PI 4.5 - 5.4, power 3.4 - 3.5.          Data:    Imaging:  reviewed recent imaging, no acute concerns    Labs:  [facility]  Recent Labs   Lab 03/25/23  0625 03/24/23  0746 03/23/23  0616 03/22/23  0714    137 136 134*   POTASSIUM 4.2 4.2 4.5 4.5   CHLORIDE 106 105 104 103   ELDA 9.4 9.3 9.1 9.1   CO2 20* 20* 21* 21*   BUN 21.7 20.0 18.1 21.9   CR 0.80 0.79 0.80 0.79   * 100* 91 102*     CBC  Recent Labs   Lab 03/25/23  0625 03/24/23  0746 03/23/23  0616 03/22/23  0714   WBC 7.2 6.6 5.9 6.7   RBC 3.40* 3.40* 3.30* 3.27*   HGB 8.9* 8.8* 8.6* 8.5*   HCT 29.8* 29.5* 28.9* 28.5*   MCV 88 87 88 87   MCH 26.2* 25.9* 26.1* 26.0*   MCHC 29.9* 29.8* 29.8* 29.8*   RDW 19.9* 19.9* 19.9* 19.3*    339 334 324     INR  Recent Labs   Lab 03/25/23  0625 03/24/23  0746 03/23/23  0616 03/22/23  0714   INR 2.55* 2.68* 2.65* 2.45*      Hepatic Panel  No lab results found in last 7 days.  GLUCOSE:   Recent Labs   Lab 03/25/23  0625 03/24/23  0746 03/23/23  0616 03/22/23  0714 03/21/23  0625 03/20/23  0601   * 100* 91 102* 111* 98       "

## 2023-03-25 NOTE — PLAN OF CARE
D: presented from clinic for management of chronic driveline infection. Dandy is now s/p I&D of driveline 3/18 with Dr. Zamora.    PMHx: SLE, antiphospholipid syndrome, CAD, CHF 2/2 ICM s/p LVAD 7/8/22, history of C.diff     I: Monitored vitals and assessed pt status. Encouraged activity.      Changed: Plan for patient to discharge tomorrow at 11 AM. Patient has meds in security so will need to have security called ASAP in the AM to bring up patient belongings. Otherwise no changes.   IV Access: PICC R SL  Running:  NA  PRN: NA  Tele: SR 1st deg. AV block with a BBB  O2: RA  Mobility: SBA Calls appropriately   Skin: LVAD dressing changed. Lower sternal dressing due for change Monday 3/27     A: A&Ox4. VSS. Afebrile. Calls appropriately. Urinal void and bathroom. Pressures good with automatic cuff. Lungs clear to dim.      P: Continue to monitor pt status and report changes to treatment team. Son picking patient up at 11am tomorrow. Patient has meds in security and will have to have those before leaving.

## 2023-03-26 VITALS
TEMPERATURE: 97.9 F | DIASTOLIC BLOOD PRESSURE: 74 MMHG | BODY MASS INDEX: 22.02 KG/M2 | SYSTOLIC BLOOD PRESSURE: 97 MMHG | HEART RATE: 76 BPM | OXYGEN SATURATION: 99 % | RESPIRATION RATE: 18 BRPM | WEIGHT: 140.3 LBS | HEIGHT: 67 IN

## 2023-03-26 LAB
ANION GAP SERPL CALCULATED.3IONS-SCNC: 12 MMOL/L (ref 7–15)
BUN SERPL-MCNC: 17.3 MG/DL (ref 8–23)
CALCIUM SERPL-MCNC: 9.1 MG/DL (ref 8.8–10.2)
CHLORIDE SERPL-SCNC: 106 MMOL/L (ref 98–107)
CREAT SERPL-MCNC: 0.77 MG/DL (ref 0.67–1.17)
DEPRECATED HCO3 PLAS-SCNC: 21 MMOL/L (ref 22–29)
ERYTHROCYTE [DISTWIDTH] IN BLOOD BY AUTOMATED COUNT: 20.1 % (ref 10–15)
GFR SERPL CREATININE-BSD FRML MDRD: >90 ML/MIN/1.73M2
GLUCOSE SERPL-MCNC: 108 MG/DL (ref 70–99)
HCT VFR BLD AUTO: 29 % (ref 40–53)
HGB BLD-MCNC: 8.6 G/DL (ref 13.3–17.7)
INR PPP: 2.32 (ref 0.85–1.15)
MCH RBC QN AUTO: 26.2 PG (ref 26.5–33)
MCHC RBC AUTO-ENTMCNC: 29.7 G/DL (ref 31.5–36.5)
MCV RBC AUTO: 88 FL (ref 78–100)
PLATELET # BLD AUTO: 319 10E3/UL (ref 150–450)
POTASSIUM SERPL-SCNC: 3.7 MMOL/L (ref 3.4–5.3)
RBC # BLD AUTO: 3.28 10E6/UL (ref 4.4–5.9)
SODIUM SERPL-SCNC: 139 MMOL/L (ref 136–145)
VANCOMYCIN SERPL-MCNC: 17.7 UG/ML
WBC # BLD AUTO: 7.9 10E3/UL (ref 4–11)

## 2023-03-26 PROCEDURE — 80048 BASIC METABOLIC PNL TOTAL CA: CPT | Performed by: SURGERY

## 2023-03-26 PROCEDURE — 85610 PROTHROMBIN TIME: CPT | Performed by: SURGERY

## 2023-03-26 PROCEDURE — 250N000013 HC RX MED GY IP 250 OP 250 PS 637: Performed by: PHYSICIAN ASSISTANT

## 2023-03-26 PROCEDURE — 250N000013 HC RX MED GY IP 250 OP 250 PS 637: Performed by: SURGERY

## 2023-03-26 PROCEDURE — 36415 COLL VENOUS BLD VENIPUNCTURE: CPT | Performed by: SURGERY

## 2023-03-26 PROCEDURE — 85027 COMPLETE CBC AUTOMATED: CPT | Performed by: SURGERY

## 2023-03-26 PROCEDURE — 80202 ASSAY OF VANCOMYCIN: CPT | Performed by: THORACIC SURGERY (CARDIOTHORACIC VASCULAR SURGERY)

## 2023-03-26 PROCEDURE — 258N000003 HC RX IP 258 OP 636: Performed by: THORACIC SURGERY (CARDIOTHORACIC VASCULAR SURGERY)

## 2023-03-26 PROCEDURE — 250N000011 HC RX IP 250 OP 636: Performed by: THORACIC SURGERY (CARDIOTHORACIC VASCULAR SURGERY)

## 2023-03-26 RX ORDER — HYDROMORPHONE HYDROCHLORIDE 2 MG/1
1-2 TABLET ORAL EVERY 8 HOURS PRN
Qty: 30 TABLET | Refills: 0 | Status: SHIPPED | OUTPATIENT
Start: 2023-03-26 | End: 2023-08-24

## 2023-03-26 RX ORDER — METHOCARBAMOL 500 MG/1
500 TABLET, FILM COATED ORAL 4 TIMES DAILY PRN
Qty: 50 TABLET | Refills: 0 | Status: SHIPPED | OUTPATIENT
Start: 2023-03-26 | End: 2023-08-24

## 2023-03-26 RX ORDER — WARFARIN SODIUM 2 MG/1
TABLET ORAL
Qty: 90 TABLET | Refills: 0 | Status: ON HOLD | OUTPATIENT
Start: 2023-03-26 | End: 2024-06-13

## 2023-03-26 RX ADMIN — TAMSULOSIN HYDROCHLORIDE 0.4 MG: 0.4 CAPSULE ORAL at 07:54

## 2023-03-26 RX ADMIN — HYDROXYCHLOROQUINE SULFATE 200 MG: 200 TABLET, FILM COATED ORAL at 07:53

## 2023-03-26 RX ADMIN — ASPIRIN 81 MG: 81 TABLET ORAL at 07:54

## 2023-03-26 RX ADMIN — DIAZEPAM 2 MG: 2 TABLET ORAL at 03:46

## 2023-03-26 RX ADMIN — VANCOMYCIN HYDROCHLORIDE 1500 MG: 10 INJECTION, POWDER, LYOPHILIZED, FOR SOLUTION INTRAVENOUS at 09:04

## 2023-03-26 RX ADMIN — FAMOTIDINE 20 MG: 20 TABLET ORAL at 07:54

## 2023-03-26 RX ADMIN — ACETAMINOPHEN 975 MG: 325 TABLET ORAL at 07:53

## 2023-03-26 RX ADMIN — GABAPENTIN 100 MG: 100 CAPSULE ORAL at 07:54

## 2023-03-26 RX ADMIN — ATORVASTATIN CALCIUM 40 MG: 40 TABLET, FILM COATED ORAL at 07:53

## 2023-03-26 RX ADMIN — DIGOXIN 125 MCG: 125 TABLET ORAL at 07:53

## 2023-03-26 RX ADMIN — FERROUS SULFATE TAB 325 MG (65 MG ELEMENTAL FE) 325 MG: 325 (65 FE) TAB at 07:54

## 2023-03-26 RX ADMIN — LISINOPRIL 10 MG: 10 TABLET ORAL at 07:54

## 2023-03-26 RX ADMIN — HYDRALAZINE HYDROCHLORIDE 25 MG: 25 TABLET, FILM COATED ORAL at 07:54

## 2023-03-26 RX ADMIN — Medication 1 TABLET: at 07:54

## 2023-03-26 RX ADMIN — METHYLCELLULOSE 1000 MG: 500 TABLET ORAL at 07:54

## 2023-03-26 ASSESSMENT — ACTIVITIES OF DAILY LIVING (ADL)
ADLS_ACUITY_SCORE: 28

## 2023-03-26 NOTE — DISCHARGE SUMMARY
[facility], [place]   Cardiothoracic Surgery Hospital Discharge Summary     Dandy Sands MRN# [id number]   Age: 62 year old YOB: 1960     Admitting Physician:  Darius Zamora MD  Discharge Physician:  RUI Hoover  Primary Care Physician:        Austin Sierra     DATE OF ADMISSION: 3/17/2023      DATE OF DISCHARGE: March 26, 2023     Admit Wt: 151 lbs   Discharge Wt: 140 lbs          Primary Diagnoses:   1. Chronic driveline infection   2. S/P Driveline I&D  3. Chronic anticoagulation, INR goal 2-3  4. IV antibiotics, single lumen non-valved PICC placement 3/17/23          Secondary Diagnoses:   1. Systemic lupus erythematous   2. Antiphospholipid syndrome  3. CAD with Hx MI  4. ICM with HFrEF s/p ICD  5. Hx LVAD placement   6. HTN  7. HLD  8. Hx C diff    PROCEDURES PERFORMED:   Date: 3/18/23.  Surgeon: Dr. Darius Zamora  1. Incision and Drainage of chronic driveline infection     INTRAOPERATIVE COMPLICATIONS:  none    PATHOLOGY RESULTS:  none     CULTURE RESULTS:    1. Surgical Cultures positive for corynebacterium striatum     CONSULTS:    1. PT/OT  2. Infectious Disease  3. 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 Zamora). Patient has previously been treated with daptomycin (10/2022, 12/2022) and more recently has been on minocycline for suppression of Corynebacterium driveline infection. Developed redness, pain, and swelling concerning for abscess (see clinic photo from ID clinic 3/16 in media tab). Wound cultures collected from driveline drainage in clinic 3/16 and negative to date.  Now s/p I&D on 3/18 with cultures sent from OR and positive for Corynebacterium striatum.  Blood culture x1 NGTD. Doing well following I&D. Afebrile, no signs of systemic infection. Recommend continuing vancomycin while awaiting full culture data.     HOSPITAL COURSE:   Dandy Sands is a 62 year old male who on 3/18/2023 underwent the above-named procedures. He was extubated with neuro status intact. He tolerated the operation well and postoperatively was admitted to the Telemonitoring floor (6C). Pain control was his main complaint.    Cardiovascular:   S/p LVAD placement (HeartMate 3) on 7/8/22   Advanced ischemic cardiomyopathy, class IV, stage D  CAD s/p PCI to LAD (2005)  S/p CRT-D (2006)  History of MI (2007)  Severe MR  Antiphospholipid antibody syndrome on chronic warfarin  HTN, HLD  No arrhythmias overnight, HD stable, in NSR.  Most recent echo showed LV EF 15-20%  - PTA ASA 81 mg daily  - PTA Atorvastatin 40 mg daily  - PTA digoxin 125mcg  - PTA lisinopril 10 mg QD  - PTA Hydralazine 25 mg TID     Pulmonary:  Saturating well on RA  - Supplemental O2 PRN to keep sats > 92%. Wean off as tolerated.  - Pulm hygiene, IS, activity and deep breathing     Neurology / MSK:  Acute post-operative pain   Pain well controlled with current regimen:  - Acetaminophen, Robaxin, PO dilaudid PRN  - Lidocaine patches, heat, ice      / Renal / Fluid / Electrolytes:  Hyponatemia, stable  BL creat ~ 0.7, most recent creatinine WNL; adequate UOP.   - Diuresis not indicated, PTA 20 mg lasix prn   - Daily weights     GI / Nutrition:   - Reg diet  - Continue bowel regimen while on oxycodone     Endocrine:  Stress induced hyperglycemia   Hgb A1c 5.5%, no insulin needs.     Infectious Disease:  1+ Corynebacterium striatum from OR wound cultures  WBC WNL, remains afebrile, no new signs or symptoms of infection  ID consulted after OR: plan to continue vancomycin  - Wound vac placed 3/22-- significant pain requiring IV dilaudid   - WOC RN recommended discontinuing wound vac due to superficiality of the wound and significant  pain with wound vac change. Discussed with Dr. Zamora -- OK to discontinue wound vac and dress with aquacel Ag. Patient will have wound care in home at discharge.      Hematology:   Hgb stable, Plt WNL, no signs or symptoms of active bleeding  - CBC daily     Anticoagulation:   - ASA 81 mg daiy  - Coumadin for LVAD, INR goal 2-3. Most recent INR therapeutic      MSK/Skin:  Sternal wound I&D site  LVAD Driveline exit site  - Driveline dressing changes per protocol  - chest wound I&D site: wound care discussed above     Disposition:  - Home with Home infusion. Has had prior PICC line training to discharge on home Vanco.   - PICC line placement on 3/21  - Medically ready for discharge since 3/24 but patient did not have a ride back to [place] -- plan to discharge 3/26.    ID Recommendations and Follow up:   Plan will be to complete 6 weeks of IV vancomycin (3/18/23-4/29/23) followed by return of oral chronic suppression ->  minocycline 100 mg po q 12h  Per ID Weekly labs required: CBC with diff, CMP, CRP and Vancomycin level. Dr. Steve Saravia ([facility]) to follow labs, clinic number [phone]  Patient will need follow up in 2 weeks and again at end of treatment ~4/29/23 for transition to minocycline suppression therapy. Prefers to follow up closer to home at [facility] in [place], [place].              - [facility] ID Provider: Dr. Steve Saravia               - clinic number [phone]    Prior to discharge, his pain was controlled well, he was able to perform most ADLs and ambulate without difficulty, and had full return of bowel and bladder function.  On March 26, 2023, he was discharged to home with R arm PICC line for antibiotics in stable condition.    Patient discharged on aspirin:  Yes 81 mg  Patient discharged on a statin: Yes  Patient discharged on beta blocker: No  Patient discharged on ACE Inhibitor/ARB: Yes         Discharge Disposition:     Discharged to home           "  Condition on Discharge:     Discharge condition: Stable   Discharge vitals: Blood pressure 90/74, pulse 79, temperature 98.1  F (36.7  C), temperature source Oral, resp. rate 18, height 1.702 m (5' 7\"), weight 63.6 kg (140 lb 4.8 oz), SpO2 98 %.     Code status on discharge: Full Code     Vitals:    03/23/23 0924 03/24/23 0347 03/25/23 0600   Weight: 64.5 kg (142 lb 3.2 oz) 64.4 kg (142 lb) 63.6 kg (140 lb 4.8 oz)       DAY OF DISCHARGE PHYSICAL EXAM:    Blood pressure 90/74, pulse 79, temperature 98.1  F (36.7  C), temperature source Oral, resp. rate 18, height 1.702 m (5' 7\"), weight 63.6 kg (140 lb 4.8 oz), SpO2 98 %.  Vitals:    03/23/23 0924 03/24/23 0347 03/25/23 0600   Weight: 64.5 kg (142 lb 3.2 oz) 64.4 kg (142 lb) 63.6 kg (140 lb 4.8 oz)      Weight; - 11 lbs since admit and trending down.   24 hr Fluid status; net loss 120 mL. UOP 1.6 L  MAPs: 80's     Gen: A&Ox4, NAD  Neuro: no focal deficits   CV: VAD humming, RRR, normal S1 S2, no murmurs, rubs or gallops.   Pulm: CTA, no wheezing or rhonchi, normal breathing on RA  Abd: nondistended, normal BS, soft, nontender  Ext: no peripheral edema  Incision: clean, dry, open area (5 cm x 3 cm x 2 cm deep)  Lines: driveline dressing clean and dry   LVAD: speed 5100, flow 3.4 - 3.6, PI 5.5 - 5.6, power 3.5 - 3.6      BMP  Recent Labs   Lab 03/25/23  0625 03/24/23  0746 03/23/23  0616 03/22/23  0714    137 136 134*   POTASSIUM 4.2 4.2 4.5 4.5   CHLORIDE 106 105 104 103   ELDA 9.4 9.3 9.1 9.1   CO2 20* 20* 21* 21*   BUN 21.7 20.0 18.1 21.9   CR 0.80 0.79 0.80 0.79   * 100* 91 102*     CBC  Recent Labs   Lab 03/25/23  0625 03/24/23  0746 03/23/23  0616 03/22/23  0714   WBC 7.2 6.6 5.9 6.7   RBC 3.40* 3.40* 3.30* 3.27*   HGB 8.9* 8.8* 8.6* 8.5*   HCT 29.8* 29.5* 28.9* 28.5*   MCV 88 87 88 87   MCH 26.2* 25.9* 26.1* 26.0*   MCHC 29.9* 29.8* 29.8* 29.8*   RDW 19.9* 19.9* 19.9* 19.3*    339 334 324     INR  Recent Labs   Lab 03/25/23  0625 " 03/24/23  0746 03/23/23  0616 03/22/23  0714   INR 2.55* 2.68* 2.65* 2.45*      Hepatic Panel  No lab results found in last 7 days.  Recent Labs   Lab 03/25/23  0625 03/24/23  0746 03/23/23  0616 03/22/23  0714 03/21/23  0625 03/20/23  0601   * 100* 91 102* 111* 98       RUE Ultrasound 3/21/2023-   Right: Normal blood flow and waveforms are demonstrated in the  internal jugular, innominate, subclavian, and axillary veins. There is  normal compressibility of the brachial, basilic and cephalic veins.  IMPRESSION:  No evidence of right upper extremity deep venous thrombosis.    DISCHARGE INSTRUCTIONS:  Avoid strenuous activities such as bowling, vacuuming, raking, shoveling, golf or tennis for 2 weeks after your surgery. It is okay to resume sex if you feel comfortable in doing so. You may have to try different positions with your partner.  Splint your chest incision by hugging a pillow or bringing your arms across your chest when coughing or sneezing. No driving if taking narcotics for pain.    Chest wound(s): Monday/Wednesday/Friday   Cleanse wound bed with Microklenz and pat dry.  Cut a piece of Aquacel AG to fit into the wound bed and place it. Ensure to cover entire wound bed.  Cover with Mepilex border.    Watch for signs of infection: increased redness, tenderness, warmth or any drainage from sternum incision.  Also a temperature > 100.5 F or chills. Call your surgeon or primary care provider's office immediately. Remove any skin glue left on incisions after 10-14 days. This will not affect your incision and can speed up healing.    Exercise is very important in your recovery. Please follow the guidelines set up for you in your cardiac rehab classes at the hospital. If outpatient cardiac rehab was ordered for you, we highly recommend you participate. If you have problems arranging your cardiac rehab, please call [phone] for all locations, with the exception of [place], please call [phone] and  [place], please call [phone].    Avoid sitting for prolonged periods of time, try to walk every hour during the day. If you have a leg incision, elevate your leg often when you are not walking.    Check your weight when you get home from the hospital and continue to check it daily through your recovery for at least a month. If you notice a weight gain of 2-3 pounds in a week, notify your primary care physician, cardiologist or surgeon.    Bowel activity may be slow after surgery. If necessary, you may take an over the counter laxative such as Milk of Magnesia or Miralax. You may have stool softeners prescribed (docusate sodium, Senokot). We recommend using stool softeners while using narcotics for pain (oxycodone/percocet, hydrocodone/vicodin, hydromorphone/dilaudid).      Wean OFF of narcotics (oxycodone, dilaudid, hydrocodone) as soon as possible. You should continue taking acetaminophen as long as you have any surgical pain as the first choice for pain control and add narcotics as necessary for pain to be tolerable.      DO NOT SMOKE.  IF YOU NEED HELP QUITTING, PLEASE TALK WITH YOUR CARDIOLOGIST OR PRIMARY DOCTOR.    You are on a blood thinner, follow the instructions you were given in the hospital and DO NOT SKIP this medication. Try and take it the same time everyday. Your primary care physician or coumadin clinic will manage the dosing. INR goal is 2-3.    You have an LVAD device, so call your LVAD coordinator with all questions and concerns.  No swimming, showering, or baths. Daily sterile driveline dressing changes as instructed. You cannot have a MRI with your LVAD in place. No contact sports.     REGARDING PRESCRIPTION REFILLS.  If you need a refill on your pain medication contact us to discuss your pain and a possible one time refill.   All other medications will be adjusted, discontinued and re-filled by your primary care physician and/or your cardiologist as they were prior to your surgery.  We have given you enough for one to three month with possibly one refill.    PRE-ADMISSION MEDICATIONS:  No current facility-administered medications on file prior to encounter.  acetaminophen (TYLENOL) 325 MG tablet, Take 3 tablets (975 mg) by mouth every 8 hours as needed for mild pain  amoxicillin (AMOXIL) 500 MG capsule, Take 4 capsules (2,000 mg) by mouth as needed (take 1 hour prior to any dental procedures)  aspirin (ASA) 81 MG EC tablet, Take 1 tablet (81 mg) by mouth daily  atorvastatin (LIPITOR) 40 MG tablet, Take 1 tablet (40 mg) by mouth daily  cefdinir (OMNICEF) 300 MG capsule, TAKE 1 Capsule BY MOUTH EVERY 12 HOURS FOR 21 DAYS  diazepam (VALIUM) 2 MG tablet, Take 1 tablet (2 mg) by mouth every 6 hours as needed for anxiety  digoxin (LANOXIN) 125 MCG tablet, Take 1 tablet (125 mcg) by mouth daily  famotidine (PEPCID) 20 MG tablet, Take 1 tablet (20 mg) by mouth 2 times daily  ferrous sulfate (FEROSUL) 325 (65 Fe) MG tablet, Take 325 mg by mouth daily (with breakfast)  furosemide (LASIX) 20 MG tablet, Take 1 tablet (20 mg) by mouth daily as needed (For weight gain or shortness of breath)  gabapentin (NEURONTIN) 100 MG capsule, TAKE 1 Capsule BY MOUTH EVERY MORNING, NOON & BEDTIME  hydrALAZINE (APRESOLINE) 25 MG tablet, Take 25 mg by mouth 3 times daily  hydrocortisone 1 % CREA cream, Place 1 g rectally daily as needed for itching  hydroxychloroquine (PLAQUENIL) 200 MG tablet, Take 1 tablet (200 mg) by mouth 2 times daily  lidocaine (LIDODERM) 5 % patch, Place 1 patch onto the skin every 24 hours To prevent lidocaine toxicity, patient should be patch free for 12 hrs daily.  lisinopril (ZESTRIL) 10 MG tablet, Take 1 tablet (10 mg) by mouth 2 times daily  loperamide (IMODIUM) 2 MG capsule, Take 1 capsule (2 mg) by mouth 2 times daily as needed for diarrhea  melatonin 5 MG tablet, Take 1 tablet (5 mg) by mouth At Bedtime  methylcellulose (CITRUCEL) 500 MG TABS tablet, Take 2 tablets (1,000 mg) by mouth 2  times daily  minocycline (MINOCIN) 100 MG capsule, Take 1 capsule by mouth 2 times daily (before meals)  multivitamin w/minerals (THERA-VIT-M) tablet, Take 1 tablet by mouth daily  pramox-pe-glycerin-petrolatum (PREPARATION H) 1-0.25-14.4-15 % CREA cream, Place rectally 2 times daily as needed for hemorrhoids Apply immediately after a bowel movement.  saline nasal (AYR SALINE) GEL topical gel, Apply 1 applicator into each nare nightly as needed for congestion  tamsulosin (FLOMAX) 0.4 MG capsule, Take 1 capsule (0.4 mg) by mouth daily  warfarin ANTICOAGULANT (COUMADIN) 2.5 MG tablet, Take 5mg on Sunday, Tuesday, Thursday and 7.5mg all other days, or as directed by anticoagulation clinic (Patient taking differently: Take 5mg on Tuesday, Thursday and 7.5mg all other days, or as directed by anticoagulation clinic)  [DISCONTINUED] clopidogrel (PLAVIX) 75 MG tablet, Take 1 tablet (75 mg) by mouth daily  [DISCONTINUED] DULoxetine (CYMBALTA) 30 MG capsule, Take 1 capsule (30 mg) by mouth daily  [DISCONTINUED] mycophenolate (GENERIC EQUIVALENT) 500 MG tablet, Take 3 tablets (1,500 mg) by mouth 2 times daily for 30 days  [DISCONTINUED] ticagrelor (BRILINTA) 90 MG tablet, Take 1 tablet (90 mg) by mouth 2 times daily       DISCHARGE MEDICATIONS:      Review of your medicines      START taking      Dose / Directions   HYDROmorphone 2 MG tablet  Commonly known as: DILAUDID      Dose: 1-2 mg  Take 0.5-1 tablets (1-2 mg) by mouth every 8 hours as needed for severe pain (7-10)  Quantity: 30 tablet  Refills: 0     methocarbamol 500 MG tablet  Commonly known as: ROBAXIN      Dose: 500 mg  Take 1 tablet (500 mg) by mouth 4 times daily as needed for muscle spasms or other (or mild-moderate pain)  Quantity: 50 tablet  Refills: 0     vancomycin 1500 mg/250 mL  IVPB  Commonly known as: VANCOCIN  Indication: LVAD driveline infection      Dose: 1,500 mg  Inject 1,500 mg into the vein every 24 hours for 35 days  Quantity: 8750 mL  Refills:  0        CONTINUE these medicines which may have CHANGED, or have new prescriptions. If we are uncertain of the size of tablets/capsules you have at home, strength may be listed as something that might have changed.      Dose / Directions   warfarin ANTICOAGULANT 2 MG tablet  Commonly known as: COUMADIN  This may have changed:     medication strength    additional instructions      Take as directed. If you are unsure how to take this medication, talk to your nurse or doctor.  Original instructions: Take 4 mg PO daily at 6 pm until next INR check, then dose per INR goal 2-3.  Quantity: 90 tablet  Refills: 0        CONTINUE these medicines which have NOT CHANGED      Dose / Directions   acetaminophen 325 MG tablet  Commonly known as: TYLENOL  Used for: LVAD (left ventricular assist device) present (H)      Dose: 975 mg  Take 3 tablets (975 mg) by mouth every 8 hours as needed for mild pain  Quantity: 270 tablet  Refills: 0     amoxicillin 500 MG capsule  Commonly known as: AMOXIL  Used for: Chronic systolic congestive heart failure (H)      Dose: 2,000 mg  Take 4 capsules (2,000 mg) by mouth as needed (take 1 hour prior to any dental procedures)  Quantity: 4 capsule  Refills: 3     aspirin 81 MG EC tablet  Commonly known as: ASA      Dose: 81 mg  Take 1 tablet (81 mg) by mouth daily  Refills: 0     atorvastatin 40 MG tablet  Commonly known as: LIPITOR  Used for: Decompensated heart failure (H)      Dose: 40 mg  Take 1 tablet (40 mg) by mouth daily  Quantity: 30 tablet  Refills: 0     diazepam 2 MG tablet  Commonly known as: VALIUM  Used for: Chronic systolic congestive heart failure (H)      Dose: 2 mg  Take 1 tablet (2 mg) by mouth every 6 hours as needed for anxiety  Quantity: 30 tablet  Refills: 0     digoxin 125 MCG tablet  Commonly known as: LANOXIN  Used for: Decompensated heart failure (H)      Dose: 125 mcg  Take 1 tablet (125 mcg) by mouth daily  Quantity: 90 tablet  Refills: 3     famotidine 20 MG  tablet  Commonly known as: PEPCID  Used for: Chronic systolic congestive heart failure (H), LVAD (left ventricular assist device) present (H), Gastroesophageal reflux disease without esophagitis      Dose: 20 mg  Take 1 tablet (20 mg) by mouth 2 times daily  Quantity: 60 tablet  Refills: 11     ferrous sulfate 325 (65 Fe) MG tablet  Commonly known as: FEROSUL      Dose: 325 mg  Take 325 mg by mouth daily (with breakfast)  Refills: 0     gabapentin 100 MG capsule  Commonly known as: NEURONTIN      TAKE 1 Capsule BY MOUTH EVERY MORNING, NOON & BEDTIME  Refills: 0     hydrALAZINE 25 MG tablet  Commonly known as: APRESOLINE      Dose: 25 mg  Take 25 mg by mouth 3 times daily  Refills: 0     hydrocortisone 1 % Crea cream  Used for: External hemorrhoids, LVAD (left ventricular assist device) present (H), Heart failure with reduced ejection fraction, NYHA class III (H)      Dose: 1 applicator  Place 1 g rectally daily as needed for itching  Quantity: 1 g  Refills: 0     hydroxychloroquine 200 MG tablet  Commonly known as: PLAQUENIL  Used for: Systemic lupus erythematosus, unspecified SLE type, unspecified organ involvement status (H)      Dose: 200 mg  Take 1 tablet (200 mg) by mouth 2 times daily  Quantity: 60 tablet  Refills: 4     lidocaine 5 % patch  Commonly known as: LIDODERM  Used for: Neuropathic pain      Dose: 1 patch  Place 1 patch onto the skin every 24 hours To prevent lidocaine toxicity, patient should be patch free for 12 hrs daily.  Quantity: 3 patch  Refills: 3     lisinopril 10 MG tablet  Commonly known as: ZESTRIL  Used for: Decompensated heart failure (H)      Dose: 10 mg  Take 1 tablet (10 mg) by mouth 2 times daily  Quantity: 180 tablet  Refills: 3     loperamide 2 MG capsule  Commonly known as: IMODIUM  Used for: Diarrhea, unspecified type      Dose: 2 mg  Take 1 capsule (2 mg) by mouth 2 times daily as needed for diarrhea  Quantity: 30 capsule  Refills: 0     melatonin 5 MG tablet  Used for:  Insomnia, unspecified type      Dose: 5 mg  Take 1 tablet (5 mg) by mouth At Bedtime  Quantity: 30 tablet  Refills: 0     methylcellulose 500 MG Tabs tablet  Commonly known as: CITRUCEL  Used for: Diarrhea, unspecified type      Dose: 1,000 mg  Take 2 tablets (1,000 mg) by mouth 2 times daily  Quantity: 60 tablet  Refills: 0     multivitamin w/minerals tablet  Used for: Heart failure with reduced ejection fraction, NYHA class III (H)      Dose: 1 tablet  Take 1 tablet by mouth daily  Refills: 0     pramox-pe-glycerin-petrolatum 1-0.25-14.4-15 % Crea cream  Commonly known as: PREPARATION H  Used for: Heart failure with reduced ejection fraction, NYHA class III (H), LVAD (left ventricular assist device) present (H), External hemorrhoids      Place rectally 2 times daily as needed for hemorrhoids Apply immediately after a bowel movement.  Quantity: 51 g  Refills: 0     saline nasal Gel topical gel      Dose: 1 applicator  Apply 1 applicator into each nare nightly as needed for congestion  Refills: 0     tamsulosin 0.4 MG capsule  Commonly known as: FLOMAX  Used for: Benign prostatic hyperplasia with lower urinary tract symptoms, symptom details unspecified      Dose: 0.4 mg  Take 1 capsule (0.4 mg) by mouth daily  Quantity: 30 capsule  Refills: 0        STOP taking    cefdinir 300 MG capsule  Commonly known as: OMNICEF        furosemide 20 MG tablet  Commonly known as: LASIX        minocycline 100 MG capsule  Commonly known as: MINOCIN              Where to get your medicines      These medications were sent to [facility] [place], [place] - [address]  [address] [address]    Phone: [phone]     HYDROmorphone 2 MG tablet    methocarbamol 500 MG tablet    warfarin ANTICOAGULANT 2 MG tablet     Some of these will need a paper prescription and others can be bought over the counter. Ask your nurse if you have questions.    Bring a paper prescription for each of these  medications    vancomycin 1500 mg/250 mL  IVPB       CC:Austin North, Morgan Barragan MD      [facility]   Cardiothoracic Surgery  Office phone: [phone]  Office fax: [phone]     * a total of 45 minutes was spent on this discharge, including coordination of care, review of lab and imaging results,  patient communication and education, and discussion of care plan with consulting teams and surgeon.

## 2023-03-26 NOTE — PHARMACY-VANCOMYCIN DOSING SERVICE
Pharmacy Vancomycin Note  Date of Service 2023  Patient's  1960   62 year old, male    Indication: Cornybacterium driveline infection  Day of Therapy: 7  Current vancomycin regimen:  1500 mg IV q24h  Current vancomycin monitoring method: AUC  Current vancomycin therapeutic monitoring goal: 400-600 mg*h/L    InsightRX Prediction of Current Vancomycin Regimen    Regimen: 1500 mg IV every 24 hours.  Start time: 09:04 on 2023  Exposure target: AUC24 (range)400-600 mg/L.hr   AUC24,ss: 523 mg/L.hr  Probability of AUC24 > 400: 98 %  Ctrough,ss: 14.7 mg/L  Probability of Ctrough,ss > 20: 2 %  Probability of nephrotoxicity (Lodise TAD ): 10 %    Current estimated CrCl = Estimated Creatinine Clearance: 90.6 mL/min (based on SCr of 0.77 mg/dL).    Creatinine for last 3 days  3/24/2023:  7:46 AM Creatinine 0.79 mg/dL  3/25/2023:  6:25 AM Creatinine 0.80 mg/dL  3/26/2023:  7:53 AM Creatinine 0.77 mg/dL    Recent Vancomycin Levels (past 3 days)  3/26/2023:  7:53 AM Vancomycin 17.7 ug/mL    Vancomycin IV Administrations (past 72 hours)                   vancomycin (VANCOCIN) 1,500 mg in 0.9% NaCl 250 mL intermittent infusion (mg) 1,500 mg New Bag 23 0904     1,500 mg New Bag 23 1015     1,500 mg New Bag 23 0949                Nephrotoxins and other renal medications (From now, onward)    Start     Dose/Rate Route Frequency Ordered Stop    23 0000  vancomycin (VANCOCIN) 1500 mg/250 mL IVPB         1,500 mg Intravenous EVERY 24 HOURS 23 1350 23 2359    23 0930  vancomycin (VANCOCIN) 1,500 mg in 0.9% NaCl 250 mL intermittent infusion         1,500 mg  over 90 Minutes Intravenous EVERY 24 HOURS 23 0907      23  lisinopril (ZESTRIL) tablet 10 mg        Note to Pharmacy: PTA Sig:Take 1 tablet (10 mg) by mouth 2 times daily      10 mg Oral 2 TIMES DAILY 23               Contrast Orders - past 72 hours (72h ago, onward)    None           Interpretation of levels and current regimen:  Vancomycin level is reflective of -600    Has serum creatinine changed greater than 50% in last 72 hours: No    Urine output:  good urine output    Renal Function: Stable    Plan:  1. Continue Current Dose, pt discharging today on 1500 mg IV q24 to continue to complete a 6 week course.  2. Home Health provider will continue to monitor vancomycin therapy and levels as needed.    Meredith Young [facility]

## 2023-03-26 NOTE — PLAN OF CARE
DISCHARGE                         No discharge date for patient encounter.  ----------------------------------------------------------------------------  Discharged to: Home  Via: Automobile  Accompanied by: Son  Discharge Instructions: diet, activity, medications, follow up appointments, when to call the MD, aftercare instructions, and what to watchout for (i.e. s/s of infection, increasing SOB, palpitations, chest pain,)  Prescriptions: To be filled by    dischrge   pharmacy per pt's request; medication list reviewed & sent with pt  Follow Up Appointments: arranged; information given  Belongings: All sent with pt and retrieved from pharmacy  IV: PICC remains for home infusion  Telemetry: off  Pt exhibits understanding of above discharge instructions; all questions answered.    Discharge Paperwork: Signed, copied, and sent home with patient.     normal...

## 2023-03-26 NOTE — PLAN OF CARE
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 Dandy is now s/p I&D of driveline 3/18 with Dr. Zamora.     PMH of SLE, antiphospholipid syndrome, CAD, CHF 2/2 ICM s/p LVAD 7/8/22, history of C.diff      I: Monitored vitals and assessed pt status.   LDA: R SL PICC   PRN:   -Valium x2  -Melatonin x1   Tele: SR w/ BBB and 1st * AVB  O2: RA  Mobility: SBA     A: A0x4, calls appropriately, able to make needs known. VSS. Afebrile.  LVAD HM3 numbers WNL, no alarms overnight. Denied pain, numbness/tingling, nausea, HA, CP, SOB/CASEY. Sometimes gets lightheaded/dizzy when get OOB but goes away pretty quick per Pt. Regular diet, good appetite. Urinating adequately via urinal. No BM this shift, LBM 3/25. Driveline dressing CDI, daily changes with betadine. Lower sternal dressing CDI, due to be changed 3/27 ([facility]) - send Pt home with 1 week supply. No new skin deficits noted this shift. Appeared to sleep well overnight. Did not want to get up for weight until later this AM.       P: Continue to monitor Pt status and report changes to CVTS. Plan to discharge home today 11AM, son picking Pt up; will need meds in security before discharging; will discharge with PICC.      Shift of care [id number]

## 2023-03-26 NOTE — PROGRESS NOTES
Care Management Follow Up    Length of Stay (days): 9    Expected Discharge Date: 03/26/2023     Concerns to be Addressed:  Discharge planning  Patient plan of care discussed at interdisciplinary rounds: Yes    Anticipated Discharge Disposition:  Home      Anticipated Discharge Services:  Skilled home care. Home infusion  Anticipated Discharge DME:      Referrals Placed by CM/SW:  Home care. Home infusion  Private pay costs discussed: Not applicable    Additional Information:  Plan is discharging home today on IV Vanco. IV med orders were faxed on 3-24 to [facility] by ARTURO Clinton.     This afternoon I faxed final Discharge Orders (AVS), Discharge Summary and latest ID note to [facility].    The fax number for [facility] had too many numbers, [phone]. I called the [facility] Care answering service and was given the following Fax: [phone]. AVS, Discharge Summary and latest ID note faxed to [facility].       Linda Dasilva RN Care Coordinator  [facility]   On 3- Weekend RNCC coverage for Units 6C & 6D. Weekend Pager: [phone].        [facility]   Phone: [phone]     Fax: [phone] (this has too many numbers). Called the answering service and obtained Fax: [phone]    [facility]     Phone: [phone]   Fax: [phone]     [facility] closed on the weekend so pt/family will  at:    [facility] [address]    [address] [place] [address]   Main Phone:  [phone]

## 2023-03-27 ENCOUNTER — TELEPHONE (OUTPATIENT)
Dept: PHARMACY | Facility: OTHER | Age: 63
End: 2023-03-27
Payer: COMMERCIAL

## 2023-03-27 ENCOUNTER — CARE COORDINATION (OUTPATIENT)
Dept: CARDIOLOGY | Facility: CLINIC | Age: 63
End: 2023-03-27
Payer: COMMERCIAL

## 2023-03-27 ENCOUNTER — PATIENT OUTREACH (OUTPATIENT)
Dept: CARE COORDINATION | Facility: CLINIC | Age: 63
End: 2023-03-27
Payer: COMMERCIAL

## 2023-03-27 ENCOUNTER — ANTICOAGULATION THERAPY VISIT (OUTPATIENT)
Dept: ANTICOAGULATION | Facility: CLINIC | Age: 63
End: 2023-03-27
Payer: COMMERCIAL

## 2023-03-27 DIAGNOSIS — Z95.811 LVAD (LEFT VENTRICULAR ASSIST DEVICE) PRESENT (H): ICD-10-CM

## 2023-03-27 DIAGNOSIS — I50.20 HEART FAILURE WITH REDUCED EJECTION FRACTION, NYHA CLASS III (H): ICD-10-CM

## 2023-03-27 DIAGNOSIS — I50.22 CHRONIC SYSTOLIC CONGESTIVE HEART FAILURE (H): Primary | ICD-10-CM

## 2023-03-27 DIAGNOSIS — Z95.811 LEFT VENTRICULAR ASSIST DEVICE PRESENT (H): ICD-10-CM

## 2023-03-27 LAB — INR (EXTERNAL): 2.3 (ref 0.9–1.1)

## 2023-03-27 NOTE — PROGRESS NOTES
[facility]    Background: Transitional Care Management program identified per system criteria and reviewed by [facility] team for possible outreach.    Assessment: Upon chart review, CCR Team member will not proceed with patient outreach related to this episode of Transitional Care Management program due to reason below:    Patient has active communication with a nurse, provider or care team for reason of post-hospital follow up plan.  Outreach call by CCR team not indicated to minimize duplicative efforts.     Plan: Transitional Care Management episode addressed appropriately per reason noted above.      Agnieszka Euceda RN  [facility] Resource [facility]    *Connected Care Resource Team does NOT follow patient ongoing. Referrals are identified based on internal discharge reports and the outreach is to ensure patient has an understanding of their discharge instructions.

## 2023-03-27 NOTE — TELEPHONE ENCOUNTER
MTM referral from: Transitions of Care (recent hospital discharge or ED visit)    MTM referral outreach attempt #1 on March 27, 2023 at 2:02 PM      Outcome: Patient declined, will route to MTM Pharmacist/Provider as an FYI. Thank you for the referral.     Verna Lewis - [facility]

## 2023-03-27 NOTE — PROGRESS NOTES
Called patient to check in after being discharged from the hospital this weekend.     Amos, patient's son answered as Dandy was currently with the home health nurse getting his first bandage change. Amos reports Dandy feeling well, no VAD concerns or medication questions.    Encouraged Amos or Dandy to page the VAD Coordinator if any questions arise in the next day or so. Verbalized understanding and agree with plan.

## 2023-03-27 NOTE — PROGRESS NOTES
ANTICOAGULATION MANAGEMENT     Dandy Sands 62 year old male is on warfarin with therapeutic INR result. (Goal INR 2.0-3.0)    Recent labs: (last 7 days)     03/27/23  1220   INR 2.3*       ASSESSMENT       Source(s): Chart Review, Patient/Caregiver Call and Home Care/Facility Nurse       Warfarin doses taken: recently hospitalized on less Warfarin-see tracking calendar    Diet: No new diet changes identified    New illness, injury, or hospitalization: Yes: hospital admission 3/17-3/26/23 for driveline infection. PICC placement 3/17/23 for IV abx.     Medication/supplement changes: 6 weeks of IV vancomycin (3/18/23-4/29/23) followed by return of oral chronic suppression ->  minocycline 100 mg po q 12h.     Signs or symptoms of bleeding or clotting: No    Previous INR: Therapeutic last 2(+) visits    Additional findings: None         PLAN     Recommended plan for temporary change(s) and ongoing change(s) affecting INR     Dosing Instructions: decrease your warfarin dose (23.7% change) with next INR in 2 days       Summary  As of 3/27/2023    Full warfarin instructions:  6.25 mg every Mon; 5 mg all other days   Next INR check:  3/29/2023             Telephone call with [facility] home care nurse who agrees to plan and repeated back plan correctly    Orders given to  Homecare nurse/facility to recheck    Education provided:     Interaction IS anticipated between warfarin and Vanco    Symptom monitoring: monitoring for bleeding signs and symptoms    Contact [phone] with any changes, questions or concerns.     Plan made with [facility] Pharmacist Radha Kelley and per LVAD protocol    LORENA MOSES, RN  Anticoagulation Clinic  3/27/2023    _______________________________________________________________________     Anticoagulation Episode Summary     Current INR goal:  2.0-3.0   TTR:  49.0 % (5.2 mo)   Target end date:  Indefinite   Send INR reminders to:  ANTICOAG LVAD    Indications    Chronic systolic congestive heart  failure (H) [I50.22]  LVAD (left ventricular assist device) present (H) [Z95.811]  Heart failure with reduced ejection fraction  NYHA class III (H) [I50.20]  Left ventricular assist device present (H) [Z95.811]           Comments:  Follow VAD Anticoag protocol:Yes: HeartMate 3   Bridging: No bridging epistaxis.   Date VAD placed: 7/8/22         Anticoagulation Care Providers     Provider Role Specialty Phone number    Kelly Lora MD Referring Cardiovascular Disease [phone]

## 2023-03-28 NOTE — PROGRESS NOTES
CVTS Followup Note  Patient well known to service.  He returns with complaints of exquisitely tender lump at base of sternum.  He did have a recent fall from standing where he hit this site.      BP (!) 70/0 (BP Location: Left arm, Patient Position: Chair, Cuff Size: Adult Regular)   Pulse 102   Wt 69.4 kg (153 lb 1.6 oz)   BMI 23.98 kg/m      In bed, conversant, comfortable  CTAB, RRR  5cm area of induration and erythema at xyphoid. Extremely tender and fluctuant.    A/P: 62yoM s/p LVAD with likely wound infection  - Plan for hospital admission with IV antibiotics and drainage in OR   - Please call with questions    Darius Zamora  Cardiothoracic surgery  [phone]

## 2023-03-29 ENCOUNTER — ANTICOAGULATION THERAPY VISIT (OUTPATIENT)
Dept: ANTICOAGULATION | Facility: CLINIC | Age: 63
End: 2023-03-29
Payer: COMMERCIAL

## 2023-03-29 DIAGNOSIS — Z95.811 LVAD (LEFT VENTRICULAR ASSIST DEVICE) PRESENT (H): ICD-10-CM

## 2023-03-29 DIAGNOSIS — I50.22 CHRONIC SYSTOLIC CONGESTIVE HEART FAILURE (H): Primary | ICD-10-CM

## 2023-03-29 DIAGNOSIS — Z95.811 LEFT VENTRICULAR ASSIST DEVICE PRESENT (H): ICD-10-CM

## 2023-03-29 DIAGNOSIS — I50.20 HEART FAILURE WITH REDUCED EJECTION FRACTION, NYHA CLASS III (H): ICD-10-CM

## 2023-03-29 LAB — INR (EXTERNAL): 2 (ref 0.9–1.1)

## 2023-03-29 NOTE — PROGRESS NOTES
ANTICOAGULATION MANAGEMENT     Dandy Sands 62 year old male is on warfarin with therapeutic INR result. (Goal INR 2.0-3.0)    Recent labs: (last 7 days)     03/29/23  0000   INR 2.0*       ASSESSMENT       Source(s): Chart Review, Patient/Caregiver Call and Home Care/Facility Nurse       Warfarin doses taken: Warfarin taken as instructed    Diet: No new diet changes identified    New illness, injury, or hospitalization: Yes: Patient was hospitalized for a driveline infection 3/17-3/26    Medication/supplement changes: Patient is currently on 6 weeks of Vanco    Signs or symptoms of bleeding or clotting: No    Previous INR: Therapeutic last 2(+) visits    Additional findings: None         PLAN     Recommended plan for temporary change(s) affecting INR     Dosing Instructions: Increase your warfarin dose (6.5% change) with next INR in 2 days       Summary  As of 3/29/2023    Full warfarin instructions:  6.25 mg every Mon, Wed, Fri; 5 mg all other days   Next INR check:  3/31/2023             Telephone call with Alia who verbalizes understanding and agrees to plan and who agrees to plan and repeated back plan correctly    Orders given to  Homecare nurse/facility to recheck    Education provided:     None required    Plan made per [facility] anticoagulation protocol and per LVAD protocol    Holli Silva RN  Anticoagulation Clinic  3/29/2023    _______________________________________________________________________     Anticoagulation Episode Summary     Current INR goal:  2.0-3.0   TTR:  49.6 % (5.3 mo)   Target end date:  Indefinite   Send INR reminders to:  ANTICOAG LVAD    Indications    Chronic systolic congestive heart failure (H) [I50.22]  LVAD (left ventricular assist device) present (H) [Z95.811]  Heart failure with reduced ejection fraction  NYHA class III (H) [I50.20]  Left ventricular assist device present (H) [Z95.811]           Comments:  Follow VAD Anticoag protocol:Yes: HeartMate 3   Bridging: No  bridging epistaxis.   Date VAD placed: 7/8/22         Anticoagulation Care Providers     Provider Role Specialty Phone number    Kelly Lora MD Referring Cardiovascular Disease [phone]

## 2023-03-31 ENCOUNTER — ANTICOAGULATION THERAPY VISIT (OUTPATIENT)
Dept: ANTICOAGULATION | Facility: CLINIC | Age: 63
End: 2023-03-31

## 2023-03-31 DIAGNOSIS — Z95.811 LEFT VENTRICULAR ASSIST DEVICE PRESENT (H): ICD-10-CM

## 2023-03-31 DIAGNOSIS — Z95.811 LVAD (LEFT VENTRICULAR ASSIST DEVICE) PRESENT (H): ICD-10-CM

## 2023-03-31 DIAGNOSIS — I50.22 CHRONIC SYSTOLIC CONGESTIVE HEART FAILURE (H): Primary | ICD-10-CM

## 2023-03-31 DIAGNOSIS — I50.20 HEART FAILURE WITH REDUCED EJECTION FRACTION, NYHA CLASS III (H): ICD-10-CM

## 2023-03-31 LAB — INR (EXTERNAL): 2.4 (ref 0.9–1.1)

## 2023-03-31 NOTE — PROGRESS NOTES
ANTICOAGULATION MANAGEMENT     Dandy Sands 62 year old male is on warfarin with therapeutic INR result. (Goal INR 2.0-3.0)    Recent labs: (last 7 days)     03/31/23  0000   INR 2.4*       ASSESSMENT       Source(s): Patient/Caregiver Call       Warfarin doses taken: Warfarin taken as instructed    Diet: No new diet changes identified    New illness, injury, or hospitalization: No    Medication/supplement changes: continues IV Vanco     Signs or symptoms of bleeding or clotting: No    Previous INR: Therapeutic last 2(+) visits    Additional findings: None         PLAN     Recommended plan for no diet, medication or health factor changes affecting INR     Dosing Instructions: Continue your current warfarin dose with next INR in 3 days       Summary  As of 3/31/2023    Full warfarin instructions:  6.25 mg every Mon, Wed, Fri; 5 mg all other days   Next INR check:  4/3/2023             Telephone call with [facility] home care nurse who verbalizes understanding and agrees to plan and who agrees to plan and repeated back plan correctly    Orders given to  Homecare nurse/facility to recheck    Education provided:     None required    Plan made per [facility] anticoagulation protocol and per LVAD protocol    Riri Giron RN  Anticoagulation Clinic  3/31/2023    _______________________________________________________________________     Anticoagulation Episode Summary     Current INR goal:  2.0-3.0   TTR:  50.2 % (5.4 mo)   Target end date:  Indefinite   Send INR reminders to:  ANTICOAG LVAD    Indications    Chronic systolic congestive heart failure (H) [I50.22]  LVAD (left ventricular assist device) present (H) [Z95.811]  Heart failure with reduced ejection fraction  NYHA class III (H) [I50.20]  Left ventricular assist device present (H) [Z95.811]           Comments:  Follow VAD Anticoag protocol:Yes: HeartMate 3   Bridging: No bridging epistaxis.   Date VAD placed: 7/8/22         Anticoagulation Care Providers     Provider  Role Specialty Phone number    Kelly Lora MD Referring Cardiovascular Disease [phone]

## 2023-04-03 ENCOUNTER — ANTICOAGULATION THERAPY VISIT (OUTPATIENT)
Dept: ANTICOAGULATION | Facility: CLINIC | Age: 63
End: 2023-04-03
Payer: COMMERCIAL

## 2023-04-03 DIAGNOSIS — Z95.811 LEFT VENTRICULAR ASSIST DEVICE PRESENT (H): ICD-10-CM

## 2023-04-03 DIAGNOSIS — I50.20 HEART FAILURE WITH REDUCED EJECTION FRACTION, NYHA CLASS III (H): ICD-10-CM

## 2023-04-03 DIAGNOSIS — I50.22 CHRONIC SYSTOLIC CONGESTIVE HEART FAILURE (H): Primary | ICD-10-CM

## 2023-04-03 DIAGNOSIS — Z95.811 LVAD (LEFT VENTRICULAR ASSIST DEVICE) PRESENT (H): ICD-10-CM

## 2023-04-03 LAB — INR (EXTERNAL): 1.9 (ref 0.9–1.1)

## 2023-04-03 NOTE — PROGRESS NOTES
ANTICOAGULATION MANAGEMENT     Dandy Sands 62 year old male is on warfarin with subtherapeutic INR result. (Goal INR 2.0-3.0)    Recent labs: (last 7 days)     04/03/23  0000   INR 1.9*       ASSESSMENT       Source(s): Chart Review, Patient/Caregiver Call and Home Care/Facility Nurse       Warfarin doses taken: Warfarin taken as instructed    Diet: No new diet changes identified    New illness, injury, or hospitalization: No    Medication/supplement changes: continues with IV vanco    Signs or symptoms of bleeding or clotting: No    Previous INR: Therapeutic last 2(+) visits    Additional findings: None         PLAN     Recommended plan for no diet, medication or health factor changes affecting INR     Dosing Instructions: Increase your warfarin dose (3.2% change) with next INR in 2 days.  Increased maintenance dose slightly-Dandy was on a higher maintenance dose before this infection started.       Summary  As of 4/3/2023    Full warfarin instructions:  5 mg every Sun, Tue, Thu; 6.25 mg all other days   Next INR check:  4/5/2023             Telephone call with [facility] home care nurse who agrees to plan and repeated back plan correctly    Orders given to  Homecare nurse/facility to recheck    Education provided:     Contact [phone] with any changes, questions or concerns.     Plan made per [facility] anticoagulation protocol and per LVAD protocol    Leatha Abbott RN  Anticoagulation Clinic  4/3/2023    _______________________________________________________________________     Anticoagulation Episode Summary     Current INR goal:  2.0-3.0   TTR:  50.3 % (5.5 mo)   Target end date:  Indefinite   Send INR reminders to:  ANTICOAG LVAD    Indications    Chronic systolic congestive heart failure (H) [I50.22]  LVAD (left ventricular assist device) present (H) [Z95.811]  Heart failure with reduced ejection fraction  NYHA class III (H) [I50.20]  Left ventricular assist device present (H) [Z95.811]           Comments:   Follow VAD Anticoag protocol:Yes: HeartMate 3   Bridging: No bridging epistaxis.   Date VAD placed: 7/8/22         Anticoagulation Care Providers     Provider Role Specialty Phone number    Kelly Lora MD Referring Cardiovascular Disease [phone]

## 2023-04-05 ENCOUNTER — ANTICOAGULATION THERAPY VISIT (OUTPATIENT)
Dept: ANTICOAGULATION | Facility: CLINIC | Age: 63
End: 2023-04-05
Payer: COMMERCIAL

## 2023-04-05 DIAGNOSIS — Z95.811 LEFT VENTRICULAR ASSIST DEVICE PRESENT (H): ICD-10-CM

## 2023-04-05 DIAGNOSIS — Z95.811 LVAD (LEFT VENTRICULAR ASSIST DEVICE) PRESENT (H): ICD-10-CM

## 2023-04-05 DIAGNOSIS — I50.20 HEART FAILURE WITH REDUCED EJECTION FRACTION, NYHA CLASS III (H): ICD-10-CM

## 2023-04-05 DIAGNOSIS — I50.22 CHRONIC SYSTOLIC CONGESTIVE HEART FAILURE (H): Primary | ICD-10-CM

## 2023-04-05 LAB — INR (EXTERNAL): 2 (ref 0.9–1.1)

## 2023-04-05 NOTE — PROGRESS NOTES
ANTICOAGULATION MANAGEMENT     Dandy Sands 62 year old male is on warfarinNoith therapeutic INR result. (Goal INR 2.0-3.0)    Recent labs: (last 7 days)     04/05/23  0000   INR 2.0*       ASSESSMENT       Source(s): Chart Review and Home Care/Facility Nurse       Warfarin doses taken: Warfarin taken as instructed    Diet: No new diet changes identified    New illness, injury, or hospitalization: No    Medication/supplement changes: continues on IV vanco    Signs or symptoms of bleeding or clotting: No    Previous INR: Subtherapeutic    Additional findings: None         PLAN     Recommended plan for no diet, medication or health factor changes affecting INR     Dosing Instructions: Continue your current warfarin dose with next INR in 5 days       Summary  As of 4/5/2023    Full warfarin instructions:  5 mg every Sun, Tue, Thu; 6.25 mg all other days   Next INR check:  4/10/2023             Telephone call with [facility] home care nurse who agrees to plan and repeated back plan correctly    Orders given to  Homecare nurse/facility to recheck    Education provided:     Contact [phone] with any changes, questions or concerns.     Plan made per [facility] anticoagulation protocol and per LVAD protocol    Leatha Abbott, RN  Anticoagulation Clinic  4/5/2023    _______________________________________________________________________     Anticoagulation Episode Summary     Current INR goal:  2.0-3.0   TTR:  50.2 % (5.5 mo)   Target end date:  Indefinite   Send INR reminders to:  ANTICOAG LVAD    Indications    Chronic systolic congestive heart failure (H) [I50.22]  LVAD (left ventricular assist device) present (H) [Z95.811]  Heart failure with reduced ejection fraction  NYHA class III (H) [I50.20]  Left ventricular assist device present (H) [Z95.811]           Comments:  Follow VAD Anticoag protocol:Yes: HeartMate 3   Bridging: No bridging epistaxis.   Date VAD placed: 7/8/22         Anticoagulation Care Providers      Provider Role Specialty Phone number    Kelly Lora MD Referring Cardiovascular Disease [phone]

## 2023-04-10 ENCOUNTER — ANTICOAGULATION THERAPY VISIT (OUTPATIENT)
Dept: ANTICOAGULATION | Facility: CLINIC | Age: 63
End: 2023-04-10
Payer: COMMERCIAL

## 2023-04-10 DIAGNOSIS — Z95.811 LEFT VENTRICULAR ASSIST DEVICE PRESENT (H): ICD-10-CM

## 2023-04-10 DIAGNOSIS — I50.22 CHRONIC SYSTOLIC CONGESTIVE HEART FAILURE (H): Primary | ICD-10-CM

## 2023-04-10 DIAGNOSIS — I50.20 HEART FAILURE WITH REDUCED EJECTION FRACTION, NYHA CLASS III (H): ICD-10-CM

## 2023-04-10 DIAGNOSIS — Z95.811 LVAD (LEFT VENTRICULAR ASSIST DEVICE) PRESENT (H): ICD-10-CM

## 2023-04-10 LAB — INR (EXTERNAL): 2.5 (ref 0.9–1.1)

## 2023-04-10 NOTE — PROGRESS NOTES
ANTICOAGULATION MANAGEMENT     Dandy Sands 62 year old male is on warfarin with therapeutic INR result. (Goal INR 2.0-3.0)    Recent labs: (last 7 days)     04/10/23  0000   INR 2.5*       ASSESSMENT       Source(s): Chart Review and Home Care/Facility Nurse       Warfarin doses taken: Warfarin taken as instructed    Diet: No new diet changes identified    New illness, injury, or hospitalization: No    Medication/supplement changes: None noted    Signs or symptoms of bleeding or clotting: No    Previous INR: Therapeutic last visit; previously outside of goal range    Additional findings: None         PLAN     Recommended plan for no diet, medication or health factor changes affecting INR     Dosing Instructions: Continue your current warfarin dose with next INR in 4 days  (Home Health sees him MWF)     Summary  As of 4/10/2023    Full warfarin instructions:  5 mg every Sun, Tue, Thu; 6.25 mg all other days   Next INR check:  4/14/2023             Telephone call with [facility] care nurse who agrees to plan and repeated back plan correctly    Orders given to  Homecare nurse/facility to recheck    Education provided:     Contact [phone] with any changes, questions or concerns.     Plan made per [facility] anticoagulation protocol and per LVAD protocol    Leatha Abbott RN  Anticoagulation Clinic  4/10/2023    _______________________________________________________________________     Anticoagulation Episode Summary     Current INR goal:  2.0-3.0   TTR:  51.6 % (5.7 mo)   Target end date:  Indefinite   Send INR reminders to:  ANTICOAG LVAD    Indications    Chronic systolic congestive heart failure (H) [I50.22]  LVAD (left ventricular assist device) present (H) [Z95.811]  Heart failure with reduced ejection fraction  NYHA class III (H) [I50.20]  Left ventricular assist device present (H) [Z95.811]           Comments:  Follow VAD Anticoag protocol:Yes: HeartMate 3   Bridging: No bridging epistaxis.   Date VAD  placed: 7/8/22         Anticoagulation Care Providers     Provider Role Specialty Phone number    Kelly Lora MD Referring Cardiovascular Disease [phone]

## 2023-04-10 NOTE — OP NOTE
OPERATIVE DATE: 3/18/23    PRE-OPERATIVE DIAGNOSIS:  1. Sternal wound infection  Patient Active Problem List   Diagnosis     Decompensated heart failure (H)     Cardiogenic shock (H)     LVAD (left ventricular assist device) present (H)     Chronic systolic congestive heart failure (H)     Heart failure with reduced ejection fraction, NYHA class III (H)     Lightheadedness     Recurrent epistaxis     Epistaxis     Complication involving left ventricular assist device (LVAD)     Acute diarrhea     C. 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 Sternal wound infection  Patient Active Problem List   Diagnosis     Decompensated heart failure (H)     Cardiogenic shock (H)     LVAD (left ventricular assist device) present (H)     Chronic systolic congestive heart failure (H)     Heart failure with reduced ejection fraction, NYHA class III (H)     Lightheadedness     Recurrent epistaxis     Epistaxis     Complication involving left ventricular assist device (LVAD)     Acute diarrhea     C. 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 Incision and drainage of sternal wound infection    SURGEON: Darius Zamora MD    ANESTHESIA: GETA    INDICATIONS:  OLEG KAUR is a 62 year old male admitted with sternal wound infection.  We were asked to evaluate for incision and drainage.  Risks and benefits of the operation were explained to the patient and their family including, but not limited to, bleeding, infection, stroke and even  death.  They understood these risks and agreed to proceed electively.    OPERATIVE REPORT:  The patient was transferred to the operating room and positioned supine on the OR table. MAC anesthesia was initiated by the anesthesia team.  The patients neck, chest, abdomen and bilateral lower extremities were clipped, prepped and draped in sterile fashion.  A pre-procedure time-out was performed confirming the correct patient, correct site and correct procedure.    Surgical site was anesthetized using quarter percent Marcaine.  Next a longitudinal skin incision was made through the prior lower aspect of the sternotomy incision.  A copious amount of purulent material was expressed.  The specimen was sent for culture.  The wound was then made made hemostatic with cautery.  The wound was packed with gauze and sterile dressing.    The patient was then transferred from the operating bed to an ICU bed and transferred to the ICU in critical, but stable, condition.    All needle, sponge and instrument counts were correct at the end of the case.    Darius Zamora  Cardiothoracic Surgery  Pager: [phone]

## 2023-04-14 ENCOUNTER — ANTICOAGULATION THERAPY VISIT (OUTPATIENT)
Dept: ANTICOAGULATION | Facility: CLINIC | Age: 63
End: 2023-04-14
Payer: COMMERCIAL

## 2023-04-14 DIAGNOSIS — I50.20 HEART FAILURE WITH REDUCED EJECTION FRACTION, NYHA CLASS III (H): ICD-10-CM

## 2023-04-14 DIAGNOSIS — Z95.811 LVAD (LEFT VENTRICULAR ASSIST DEVICE) PRESENT (H): ICD-10-CM

## 2023-04-14 DIAGNOSIS — Z95.811 LEFT VENTRICULAR ASSIST DEVICE PRESENT (H): ICD-10-CM

## 2023-04-14 DIAGNOSIS — I50.22 CHRONIC SYSTOLIC CONGESTIVE HEART FAILURE (H): Primary | ICD-10-CM

## 2023-04-14 LAB — INR (EXTERNAL): 2.7 (ref 0.9–1.1)

## 2023-04-14 NOTE — PROGRESS NOTES
ANTICOAGULATION MANAGEMENT     Dandy Sands 62 year old male is on warfarin with therapeutic INR result. (Goal INR 2.0-3.0)    Recent labs: (last 7 days)     04/14/23  0000   INR 2.7*       ASSESSMENT       Source(s): Chart Review and Patient/Caregiver Call       Warfarin doses taken: Warfarin taken as instructed    Diet: No new diet changes identified    Medication/supplement changes: None noted    New illness, injury, or hospitalization: No    Signs or symptoms of bleeding or clotting: No    Previous INR: Therapeutic last 2(+) visits    Additional findings: INR trending up.  Will continue to monitor for now.          PLAN     Recommended plan for no diet, medication or health factor changes affecting INR     Dosing Instructions: Continue your current warfarin dose with next INR in 5 days       Summary  As of 4/14/2023    Full warfarin instructions:  5 mg every Sun, Tue, Thu; 6.25 mg all other days   Next INR check:  4/19/2023             Telephone call with [facility] nurse who verbalizes understanding and agrees to plan and who agrees to plan and repeated back plan correctly    Orders given to  Homecare nurse/facility to recheck    Education provided:     Taking warfarin: Importance of taking warfarin as instructed    Goal range and lab monitoring: goal range and significance of current result and Importance of therapeutic range    Plan made per [facility] anticoagulation protocol and per LVAD protocol    Holli Silva RN  Anticoagulation Clinic  4/14/2023    _______________________________________________________________________     Anticoagulation Episode Summary     Current INR goal:  2.0-3.0   TTR:  52.8 % (5.8 mo)   Target end date:  Indefinite   Send INR reminders to:  ANTICOAG LVAD    Indications    Chronic systolic congestive heart failure (H) [I50.22]  LVAD (left ventricular assist device) present (H) [Z95.811]  Heart failure with reduced ejection fraction  NYHA class III (H) [I50.20]  Left  ventricular assist device present (H) [Z95.811]           Comments:  Follow VAD Anticoag protocol:Yes: HeartMate 3   Bridging: No bridging epistaxis.   Date VAD placed: 7/8/22         Anticoagulation Care Providers     Provider Role Specialty Phone number    Kelly Lora MD Referring Cardiovascular Disease [phone]

## 2023-04-15 LAB — BACTERIA ABSC ANAEROBE+AEROBE CULT: NO GROWTH

## 2023-04-19 ENCOUNTER — ANTICOAGULATION THERAPY VISIT (OUTPATIENT)
Dept: ANTICOAGULATION | Facility: CLINIC | Age: 63
End: 2023-04-19
Payer: COMMERCIAL

## 2023-04-19 DIAGNOSIS — I50.22 CHRONIC SYSTOLIC CONGESTIVE HEART FAILURE (H): Primary | ICD-10-CM

## 2023-04-19 DIAGNOSIS — I50.20 HEART FAILURE WITH REDUCED EJECTION FRACTION, NYHA CLASS III (H): ICD-10-CM

## 2023-04-19 DIAGNOSIS — Z95.811 LEFT VENTRICULAR ASSIST DEVICE PRESENT (H): ICD-10-CM

## 2023-04-19 DIAGNOSIS — Z95.811 LVAD (LEFT VENTRICULAR ASSIST DEVICE) PRESENT (H): ICD-10-CM

## 2023-04-19 LAB — INR (EXTERNAL): 2 (ref 0.9–1.1)

## 2023-04-19 NOTE — PROGRESS NOTES
ANTICOAGULATION MANAGEMENT     Dandy Sands 62 year old male is on warfarin with therapeutic INR result. (Goal INR 2.0-3.0)    Recent labs: (last 7 days)     04/19/23  1146   INR 2.0*       ASSESSMENT       Source(s): Chart Review and Home Care/Facility Nurse       Warfarin doses taken: Warfarin taken as instructed    Diet: No new diet changes identified    Medication/supplement changes: None noted    New illness, injury, or hospitalization: No    Signs or symptoms of bleeding or clotting: No    Previous INR: Therapeutic last 2(+) visits    Additional findings: None         PLAN     Recommended plan for no diet, medication or health factor changes affecting INR     Dosing Instructions: Continue your current warfarin dose with next INR in 1 week       Summary  As of 4/19/2023    Full warfarin instructions:  5 mg every Sun, Tue, Thu; 6.25 mg all other days   Next INR check:  4/26/2023             Telephone call with [facility] nurse who verbalizes understanding and agrees to plan and who agrees to plan and repeated back plan correctly    Orders given to  Homecare nurse/facility to recheck    Education provided:     None required    Plan made per [facility] anticoagulation protocol and per LVAD protocol    Chon Tinsley, RN  Anticoagulation Clinic  4/19/2023    _______________________________________________________________________     Anticoagulation Episode Summary     Current INR goal:  2.0-3.0   TTR:  54.2 % (6 mo)   Target end date:  Indefinite   Send INR reminders to:  ANTICOAG LVAD    Indications    Chronic systolic congestive heart failure (H) [I50.22]  LVAD (left ventricular assist device) present (H) [Z95.811]  Heart failure with reduced ejection fraction  NYHA class III (H) [I50.20]  Left ventricular assist device present (H) [Z95.811]           Comments:  Follow VAD Anticoag protocol:Yes: HeartMate 3   Bridging: No bridging epistaxis.   Date VAD placed: 7/8/22         Anticoagulation Care Providers      Adderall Approved    Prior Authorization Number: [id number]  Dates of approval: 09/26/21-10/25/21  Filling [facility]acy: [facility]  Additional Information: Pharmacy has a paid claim and will notify patient when ready.      Provider Role Specialty Phone number    Kelly Lora MD Referring Cardiovascular Disease [phone]

## 2023-04-26 ENCOUNTER — ANTICOAGULATION THERAPY VISIT (OUTPATIENT)
Dept: ANTICOAGULATION | Facility: CLINIC | Age: 63
End: 2023-04-26
Payer: COMMERCIAL

## 2023-04-26 DIAGNOSIS — Z95.811 LEFT VENTRICULAR ASSIST DEVICE PRESENT (H): ICD-10-CM

## 2023-04-26 DIAGNOSIS — Z95.811 LVAD (LEFT VENTRICULAR ASSIST DEVICE) PRESENT (H): ICD-10-CM

## 2023-04-26 DIAGNOSIS — I50.22 CHRONIC SYSTOLIC CONGESTIVE HEART FAILURE (H): Primary | ICD-10-CM

## 2023-04-26 DIAGNOSIS — I50.20 HEART FAILURE WITH REDUCED EJECTION FRACTION, NYHA CLASS III (H): ICD-10-CM

## 2023-04-26 LAB — INR (EXTERNAL): 2.3 (ref 0.9–1.1)

## 2023-04-26 NOTE — PROGRESS NOTES
ANTICOAGULATION MANAGEMENT     Dandy Sands 62 year old male is on warfarin with therapeutic INR result. (Goal INR 2.0-3.0)    Recent labs: (last 7 days)     04/26/23  1359   INR 2.3*       ASSESSMENT       Source(s): Chart Review and Home Care/Facility Nurse       Warfarin doses taken: Warfarin taken as instructed    Diet: No new diet changes identified    Medication/supplement changes: None noted    New illness, injury, or hospitalization: No    Signs or symptoms of bleeding or clotting: No    Previous result: Therapeutic last 2(+) visits    Additional findings: will be completing IV abx at the end of this week with to be pulled on Monday-likely will discharge from home care on Monday. Standing lab orders faxed to local clinic-[facility]         PLAN     Recommended plan for temporary change(s) affecting INR     Dosing Instructions: Continue your current warfarin dose with next INR in 2 weeks       Summary  As of 4/26/2023    Full warfarin instructions:  5 mg every Sun, Tue, Thu; 6.25 mg all other days   Next INR check:  5/10/2023             Telephone call with [facility] nurse who agrees to plan and repeated back plan correctly    Patient using outside facility for labs    Education provided:     Importance of notifying anticoagulation clinic for: if you did not receive dosing instructions on the same day as your labs were checked    Contact [phone] with any changes, questions or concerns.     Plan made per [facility] anticoagulation protocol and per LVAD protocol    LORENA MOSES RN  Anticoagulation Clinic  4/26/2023    _______________________________________________________________________     Anticoagulation Episode Summary     Current INR goal:  2.0-3.0   TTR:  55.9 % (6.2 mo)   Target end date:  Indefinite   Send INR reminders to:  ANTICOAG LVAD    Indications    Chronic systolic congestive heart failure (H) [I50.22]  LVAD (left ventricular assist device) present (H) [Z95.811]  Heart  failure with reduced ejection fraction  NYHA class III (H) [I50.20]  Left ventricular assist device present (H) [Z95.811]           Comments:  Follow VAD Anticoag protocol:Yes: HeartMate 3   Bridging: No bridging epistaxis.   Date VAD placed: 7/8/22         Anticoagulation Care Providers     Provider Role Specialty Phone number    Kelly Lora MD Referring Cardiovascular Disease [phone]

## 2023-05-08 ENCOUNTER — ANTICOAGULATION THERAPY VISIT (OUTPATIENT)
Dept: ANTICOAGULATION | Facility: CLINIC | Age: 63
End: 2023-05-08
Payer: COMMERCIAL

## 2023-05-08 DIAGNOSIS — I50.20 HEART FAILURE WITH REDUCED EJECTION FRACTION, NYHA CLASS III (H): ICD-10-CM

## 2023-05-08 DIAGNOSIS — Z95.811 LVAD (LEFT VENTRICULAR ASSIST DEVICE) PRESENT (H): ICD-10-CM

## 2023-05-08 DIAGNOSIS — Z95.811 LEFT VENTRICULAR ASSIST DEVICE PRESENT (H): ICD-10-CM

## 2023-05-08 DIAGNOSIS — I50.22 CHRONIC SYSTOLIC CONGESTIVE HEART FAILURE (H): Primary | ICD-10-CM

## 2023-05-08 LAB — INR (EXTERNAL): 2.4 (ref 0.9–1.1)

## 2023-05-08 NOTE — PROGRESS NOTES
ANTICOAGULATION MANAGEMENT     Dandy Sands 62 year old male is on warfarin with therapeutic INR result. (Goal INR 2.0-3.0)    Recent labs: (last 7 days)     05/08/23  0000   INR 2.4*       ASSESSMENT       Source(s): Chart Review and Patient/Caregiver Call       Warfarin doses taken: Warfarin taken as instructed    Diet: No new diet changes identified    Medication/supplement changes: None noted    New illness, injury, or hospitalization: No    Signs or symptoms of bleeding or clotting: No    Previous result: Therapeutic last 2(+) visits    Additional findings: None         PLAN     Recommended plan for no diet, medication or health factor changes affecting INR     Dosing Instructions: Continue your current warfarin dose with next INR in 2 weeks       Summary  As of 5/8/2023    Full warfarin instructions:  5 mg every Sun, Tue, Thu; 6.25 mg all other days   Next INR check:  5/22/2023             Telephone call with Dandy who verbalizes understanding and agrees to plan    Patient using outside facility for labs    Education provided:     Goal range and lab monitoring: goal range and significance of current result    Plan made per [facility] anticoagulation protocol and per LVAD protocol    Kiki Dave RN  Anticoagulation Clinic  5/8/2023    _______________________________________________________________________     Anticoagulation Episode Summary     Current INR goal:  2.0-3.0   TTR:  58.6 % (6.6 mo)   Target end date:  Indefinite   Send INR reminders to:  ANTICOAG LVAD    Indications    Chronic systolic congestive heart failure (H) [I50.22]  LVAD (left ventricular assist device) present (H) [Z95.811]  Heart failure with reduced ejection fraction  NYHA class III (H) [I50.20]  Left ventricular assist device present (H) [Z95.811]           Comments:  Follow VAD Anticoag protocol:Yes: HeartMate 3   Bridging: No bridging epistaxis.   Date VAD placed: 7/8/22         Anticoagulation Care Providers     Provider Role  Specialty Phone number    Kelly Lora MD Referring Cardiovascular Disease [phone]

## 2023-05-16 ENCOUNTER — CARE COORDINATION (OUTPATIENT)
Dept: CARDIOLOGY | Facility: CLINIC | Age: 63
End: 2023-05-16
Payer: COMMERCIAL

## 2023-05-16 LAB — INR (EXTERNAL): 2.2 (ref 0.9–1.1)

## 2023-05-16 NOTE — PROGRESS NOTES
D:  Rcvd page from daughter Asha to report that son, Nicholas brought pt to [facility] for a change in mentation & confusion.     I:  Informed primary cardiologist and coordinator.  Called ER and gave patient placement contact information for recs.  P: Will call VAD coordinator with further needs and questions.     Detail Level: Detailed Quality 130: Documentation Of Current Medications In The Medical Record: Current Medications Documented

## 2023-05-17 LAB — INR (EXTERNAL): 2.3 (ref 0.9–1.1)

## 2023-05-17 NOTE — PROGRESS NOTES
1645: spoke with cardiology NP at [facility]. Reports pt is vitally stable, head CT negative, but only oriented x1. They will admit pt and continue testing for possible causes of confusion.

## 2023-05-18 LAB — INR (EXTERNAL): 2.3 (ref 0.9–1.1)

## 2023-05-19 ENCOUNTER — CARE COORDINATION (OUTPATIENT)
Dept: CARDIOLOGY | Facility: CLINIC | Age: 63
End: 2023-05-19
Payer: COMMERCIAL

## 2023-05-19 LAB — INR (EXTERNAL): 2.8 (ref 0.9–1.1)

## 2023-05-19 NOTE — PROGRESS NOTES
Called pt to check in. He remains admitted at [facility]. His mentation is clear, however now is having frequent loose stools and nausea/vomitting. No source identified. Provided emotional support for pt. Will continue to follow notes in care everywhere and check in on pt next week.

## 2023-05-20 LAB — INR (EXTERNAL): 3.7 (ref 0.9–1.1)

## 2023-05-23 ENCOUNTER — TELEPHONE (OUTPATIENT)
Dept: ANTICOAGULATION | Facility: CLINIC | Age: 63
End: 2023-05-23
Payer: COMMERCIAL

## 2023-05-23 ENCOUNTER — CARE COORDINATION (OUTPATIENT)
Dept: CARDIOLOGY | Facility: CLINIC | Age: 63
End: 2023-05-23
Payer: COMMERCIAL

## 2023-05-23 DIAGNOSIS — Z95.811 LVAD (LEFT VENTRICULAR ASSIST DEVICE) PRESENT (H): ICD-10-CM

## 2023-05-23 DIAGNOSIS — Z95.811 LEFT VENTRICULAR ASSIST DEVICE PRESENT (H): ICD-10-CM

## 2023-05-23 DIAGNOSIS — I50.22 CHRONIC SYSTOLIC CONGESTIVE HEART FAILURE (H): Primary | ICD-10-CM

## 2023-05-23 DIAGNOSIS — I50.20 HEART FAILURE WITH REDUCED EJECTION FRACTION, NYHA CLASS III (H): ICD-10-CM

## 2023-05-23 NOTE — PROGRESS NOTES
Called pt to check in after discharge from [facility] on 5/20/23.   Pt did not answer. Left voicemail requesting call back.

## 2023-05-23 NOTE — TELEPHONE ENCOUNTER
ANTICOAGULATION  MANAGEMENT: Discharge Review    Dandy Sands chart reviewed for anticoagulation continuity of care    Hospital Admission on 5/16/23 to 5/20/23 for Hospital Course  Dandy is a 62M with a PMH of ICM HFrEF s/p HM3 LVAD (7/2022 at [facility]), Chronic Driveline Infection, CAD s/p PCI, HTN, HLD, SLE/APLS on warfarin presenting with confusion in the setting of diarrhea and nausea / vomitus. He was evalauted by the hospitalists and neurology teams who felt his confusion was related to his acute JOSE and dehydrated status POA in the setting of his chronic medical conditions. His confusion improved to baseline with supportive care and his diarrhea / nausea / vomiting was evaluated with imaging revealing duodenitis. Ultimately this also improved with supportive care. Of note he has a chronic driveline infection as well as a chest wall infection after sustaining injury to his chest wall prior to this admission. This is being managed by ID and he will follow up outpatient with wound care and ID.        Discharge disposition: Home with Home Care  [facility]   Specialty: [facility]   [facility]  [address] [place] [id number]   Phone: [phone]     Results:  Updated in calendar    Anticoagulation inpatient management:     held warfarin due to Supratherapeutic INR result  and resumed on 5/21/2023    Anticoagulation discharge instructions:     Warfarin dosing: home regimen continued   Bridging: No   INR goal change: No      Medication changes affecting anticoagulation: No    Additional factors affecting anticoagulation: Yes: Left voicemail for patient to call [facility] to confirm missed dose on 5/20/2023.  Outside hospital recommended Dandy hold warfarin on 5/20/2023.    No medication changes per outside discharge summary.     PLAN     Recommend to check INR on 5/24/2023    Left a detailed message for Dandy    Anticoagulation Calendar updated    Chon Tinsley, RN

## 2023-05-24 NOTE — TELEPHONE ENCOUNTER
Patient does not have home care.  This was confirmed with [facility]. Discharge notes indicate INR should be drawn on 5/29/23.  This is memorial day and clinics won't be open.  Writer speaks with patient about date he plans to go in for INR test and patient will go in on 5/30/23. Calendar is updated. [facility]

## 2023-05-30 ENCOUNTER — CARE COORDINATION (OUTPATIENT)
Dept: CARDIOLOGY | Facility: CLINIC | Age: 63
End: 2023-05-30
Payer: COMMERCIAL

## 2023-05-30 LAB — INR (EXTERNAL): 1.9 (ref 0.9–1.1)

## 2023-05-30 NOTE — PROGRESS NOTES
Pt called VAD coordinator this am to report increased drainage and redness at LVAD drive line exit site.  Site has been managed by [facility] ID clinic. Instructed pt to call their clinic to report changes and request appointment. Reminded pt that son or daughter would have to accompany him to do a dressing change. Pt verbalized understanding.

## 2023-06-01 ENCOUNTER — CARE COORDINATION (OUTPATIENT)
Dept: CARDIOLOGY | Facility: CLINIC | Age: 63
End: 2023-06-01

## 2023-06-01 ENCOUNTER — ANTICOAGULATION THERAPY VISIT (OUTPATIENT)
Dept: ANTICOAGULATION | Facility: CLINIC | Age: 63
End: 2023-06-01

## 2023-06-01 DIAGNOSIS — Z95.811 LVAD (LEFT VENTRICULAR ASSIST DEVICE) PRESENT (H): ICD-10-CM

## 2023-06-01 DIAGNOSIS — Z95.811 LEFT VENTRICULAR ASSIST DEVICE PRESENT (H): ICD-10-CM

## 2023-06-01 DIAGNOSIS — I50.20 HEART FAILURE WITH REDUCED EJECTION FRACTION, NYHA CLASS III (H): ICD-10-CM

## 2023-06-01 DIAGNOSIS — I50.22 CHRONIC SYSTOLIC CONGESTIVE HEART FAILURE (H): Primary | ICD-10-CM

## 2023-06-01 NOTE — PROGRESS NOTES
Pt contacted his [facility] ID clinic as instructed. Per review of care everywhere, antibiotics were changed to linezolid.   Pt's son sent a picture of the site on [employer] yesterday afternoon. Pt has a granuloma growing at the site, picture appears to have dried blood on it. Instructed pt and son to continue with daily dressing changes, with silverlon, at this time and provide update after a few days on linezolid.

## 2023-06-01 NOTE — PROGRESS NOTES
ANTICOAGULATION MANAGEMENT     Dandy Sands 62 year old male is on warfarin with subtherapeutic INR result. (Goal INR 2.0-3.0)    Recent labs: (last 7 days)     05/30/23  0000   INR 1.9*       ASSESSMENT       Source(s): Chart Review and Patient/Caregiver Call       Warfarin doses taken: Warfarin taken as instructed    Diet: No new diet changes identified    Medication/supplement changes: Patient is starting Zyvox    New illness, injury, or hospitalization: Yes: hospitalized 5/16-5/20 for driveline infection    Signs or symptoms of bleeding or clotting: No    Previous result: Supratherapeutic    Additional findings: Patient reports that he was notified yesterday of INR results and was told to continue with current warfarin dose.  Writer does not see documentation of this. Writer inquires if patient knows who called him and patient does not know.  Writer explains [facility] should be only clinic giving patient instructions on warfarin dosing.          PLAN     Recommended plan for ongoing change(s) affecting INR     Dosing Instructions: Continue your current warfarin dose with next INR in 1 week       Summary  As of 6/1/2023    Full warfarin instructions:  5 mg every Sun, Tue, Thu; 6.25 mg all other days   Next INR check:  6/6/2023             Telephone call with Dandy who verbalizes understanding and agrees to plan and who agrees to plan and repeated back plan correctly    Patient using outside facility for labs    Education provided:     Taking warfarin: Importance of taking warfarin as instructed    Goal range and lab monitoring: goal range and significance of current result and Importance of therapeutic range    Plan made per [facility] anticoagulation protocol and per LVAD protocol    Holli Silva RN  Anticoagulation Clinic  6/1/2023    _______________________________________________________________________     Anticoagulation Episode Summary     Current INR goal:  2.0-3.0   TTR:  60.1 % (7.4 mo)   Target end date:   Indefinite   Send INR reminders to:  ANTICOAG LVAD    Indications    Chronic systolic congestive heart failure (H) [I50.22]  LVAD (left ventricular assist device) present (H) [Z95.811]  Heart failure with reduced ejection fraction  NYHA class III (H) [I50.20]  Left ventricular assist device present (H) [Z95.811]           Comments:  Follow VAD Anticoag protocol:Yes: HeartMate 3   Bridging: No bridging epistaxis.   Date VAD placed: 7/8/22         Anticoagulation Care Providers     Provider Role Specialty Phone number    Kelly Lora MD Referring Cardiovascular Disease [phone]

## 2023-06-04 ENCOUNTER — HEALTH MAINTENANCE LETTER (OUTPATIENT)
Age: 63
End: 2023-06-04

## 2023-06-07 ENCOUNTER — ANTICOAGULATION THERAPY VISIT (OUTPATIENT)
Dept: ANTICOAGULATION | Facility: CLINIC | Age: 63
End: 2023-06-07

## 2023-06-07 DIAGNOSIS — I50.22 CHRONIC SYSTOLIC CONGESTIVE HEART FAILURE (H): Primary | ICD-10-CM

## 2023-06-07 DIAGNOSIS — Z95.811 LEFT VENTRICULAR ASSIST DEVICE PRESENT (H): ICD-10-CM

## 2023-06-07 DIAGNOSIS — I50.20 HEART FAILURE WITH REDUCED EJECTION FRACTION, NYHA CLASS III (H): ICD-10-CM

## 2023-06-07 DIAGNOSIS — Z95.811 LVAD (LEFT VENTRICULAR ASSIST DEVICE) PRESENT (H): ICD-10-CM

## 2023-06-07 LAB — INR (EXTERNAL): 3 (ref 0.9–1.1)

## 2023-06-07 NOTE — PROGRESS NOTES
ANTICOAGULATION MANAGEMENT     Dandy Sands 62 year old male is on warfarin with therapeutic INR result. (Goal INR 2.0-3.0)    Recent labs: (last 7 days)     06/07/23  0000   INR 3.0*       ASSESSMENT       Source(s): Chart Review       Warfarin doses taken: Reviewed in chart    Diet: No new diet changes identified    Medication/supplement changes: None noted    New illness, injury, or hospitalization: No    Signs or symptoms of bleeding or clotting: No    Previous result: Subtherapeutic    Additional findings: Patient saw NP today.  Notes indicate home care is discharging patient today and abx have been completed. Patient is encouraged to stay well hydrated and eating a high protein diet. INR went from 1.9 to 3.0 so writer recommends slightly smaller dose just today.          PLAN     Recommended plan for ongoing change(s) affecting INR     Dosing Instructions: partial hold then continue your current warfarin dose with next INR in 1 week       Summary  As of 6/7/2023    Full warfarin instructions:  6/7: 5 mg; Otherwise 5 mg every Sun, Tue, Thu; 6.25 mg all other days   Next INR check:  6/14/2023             Detailed voice message left for Dandy with dosing instructions and follow up date.     Patient using outside facility for labs    Education provided:     Please call back if any changes to your diet, medications or how you've been taking warfarin    Contact [phone] with any changes, questions or concerns.     Plan made per [facility] anticoagulation protocol and per LVAD protocol    Holli Silva RN  Anticoagulation Clinic  6/7/2023    _______________________________________________________________________     Anticoagulation Episode Summary     Current INR goal:  2.0-3.0   TTR:  61.1 % (7.6 mo)   Target end date:  Indefinite   Send INR reminders to:  ANTICOAG LVAD    Indications    Chronic systolic congestive heart failure (H) [I50.22]  LVAD (left ventricular assist device) present (H) [Z95.811]  Heart  failure with reduced ejection fraction  NYHA class III (H) [I50.20]  Left ventricular assist device present (H) [Z95.811]           Comments:  Follow VAD Anticoag protocol:Yes: HeartMate 3   Bridging: No bridging epistaxis.   Date VAD placed: 7/8/22         Anticoagulation Care Providers     Provider Role Specialty Phone number    Kelly Lora MD Referring Cardiovascular Disease [phone]

## 2023-06-09 ENCOUNTER — CARE COORDINATION (OUTPATIENT)
Dept: CARDIOLOGY | Facility: CLINIC | Age: 63
End: 2023-06-09

## 2023-06-09 LAB — INR (EXTERNAL): 2.9 (ref 0.9–1.1)

## 2023-06-09 NOTE — PROGRESS NOTES
VAD coordinator notified by NP at [facility] that pt was admitted for nausea, vomitting and diarrhea. Encouraged NP to consider transfer as this is pt's second admit in the last month.   Called pt to check in. Pt did not answer. Left voicemail requesting call back.   Reviewed notes and labs in care everywhere. Pt is receiving IV fluids. Pt is being followed by ID. Stool samples sent for cause/source of diarrhea. Pt had a recent change in abx for chronic DLES infection (followed by [facility] ID team).   Primary cardiologist and Cards 2 attending notified.

## 2023-06-12 ENCOUNTER — CARE COORDINATION (OUTPATIENT)
Dept: CARDIOLOGY | Facility: CLINIC | Age: 63
End: 2023-06-12

## 2023-06-12 LAB — INR (EXTERNAL): 5.9 (ref 0.9–1.1)

## 2023-06-12 NOTE — PROGRESS NOTES
"Reviewed pt's notes and labs via care everywhere. Noted that pt's abx switched to IV and that pt's hgb decreased from 11.3 on 6/8 to 8.9 today. INR trending up and is 5.9 today.  Called pt to check in. Pt reports nausea is improving. GI was consulted and told him he had an infection in his gut and he was started on an \"acid reducer\". Pt continues to have diarrhea which he describes as pure liquid, not black or tarry, no blood noted. Pt reports that his medical issues are causing strain within his family, more specifically related to daily dressing changes, and he feels like a burden. Provided emotional support and empathetic listening.   Spoke with Cardiology NP at [facility]. She reports pt's VAD and VS are stable. GI was consulted - CT abdomen completed and revealed duodenitis. Pt was started on a PPI. With pt's INR in the 5's, they do not feel that they can do an endoscopy at this time. Pt has no s/s of active bleeding. GI has signed off. Pt transitioned from oral to IV abx, currently on IV vanco for a 14 day course. ID managing. NP ordered palliative care consult as pt has voiced poor quality - note not complete at this time. NP will change pt's VAD dressing this week while he is inpt. She has consulted care coordinator to see if home care may be an option for dressing changes.   NP discussed option of transfer to [facility]. Initially pt was very resistant, however now is saying if he has to come he will. Discussed VAD coordinator concern with drifting Hgb and high INR, and if that cannot be corrected or if pt needs intervention, [facility] may be a better option. NP in agreement. Will continue to be in contact with [facility] team.   Note routed to primary cardiologist to update.   "

## 2023-06-13 ENCOUNTER — CARE COORDINATION (OUTPATIENT)
Dept: CARDIOLOGY | Facility: CLINIC | Age: 63
End: 2023-06-13

## 2023-06-13 LAB — INR (EXTERNAL): 4.3 (ref 0.9–1.1)

## 2023-06-13 NOTE — PROGRESS NOTES
VAD Coordinator contacted by [facility] NP for update on pt. Hgb continues to trend down and is 8.1 today (from 11.3 on 6/8). INR drifting down, currently 4.3 today from 5.9 yesterday.   GI had signed off yesterday after giving recommendation to start PPI. NP re-consulted GI service today. They have not responded to NP's message at this time. Pt does endorse slightly darker stool at times, which had not previously been disclosed to GI or VAD coordinator.   NP will staff with attending Cardiologist at [facility] today. Provided phone number for patient placement in the event transfer is necessary. VAD Coordinator voiced concern over pt's frailty and lack of reserve. NP agreed.

## 2023-06-15 ENCOUNTER — CARE COORDINATION (OUTPATIENT)
Dept: CARDIOLOGY | Facility: CLINIC | Age: 63
End: 2023-06-15

## 2023-06-15 NOTE — PROGRESS NOTES
Spoke with NP at [facility] today for update on pt.   GI was reconsulted. hgb dropped yesterday to 7.3. No blood products given. Hgb rebounded today to 8.1. INR has continued to drift down with holding warfarin and is 2.3 today. GI is planning to scope tomorrow. NP will be in attendance for procedure.   NP reports DLES drainage is improving on IV vancomycin. Unclear on plans for discharge. Reviewed updates with Dr. Lora. Reviewed INR and bridging recommendations - if INR continues to drift down below 1.5, ok to bridge with heparin. Relayed recommendation to NP.

## 2023-06-16 ENCOUNTER — CARE COORDINATION (OUTPATIENT)
Dept: CARDIOLOGY | Facility: CLINIC | Age: 63
End: 2023-06-16

## 2023-06-16 NOTE — PROGRESS NOTES
D:  Rcvd call from Anne SERRANO NP at [place] [place] with update.  Anne reports pt had endoscopy procedure today.  Per GI, no active bleed found, but will need further workup.  Sending stool samples today.  HGB low, but has been stable in the 7's, pt has not rcvd blood transfusion.    I:  Informed primary coordinator, cardiologist and oncall cardiologist.  Advised NP ideally pt to be transferred here for continued care. Gave contact info for oncall cardiologist to further discuss possible transfer.  P:  Anne verbalized understanding and will contact us PRN.

## 2023-06-17 LAB — INR (EXTERNAL): 1.5 (ref 0.9–1.1)

## 2023-06-20 LAB — INR (EXTERNAL): 1.8 (ref 0.9–1.1)

## 2023-06-22 ENCOUNTER — CARE COORDINATION (OUTPATIENT)
Dept: CARDIOLOGY | Facility: CLINIC | Age: 63
End: 2023-06-22

## 2023-06-22 NOTE — PROGRESS NOTES
Called pt to check in after discharge from [facility] on 6/20.   Pt reports feeling much better. Diarrhea has resolved. Nausea is improved and he is eating better. Pt reports he saw local ID provider yesterday and plan is to try a new IV abx that will be administered q2 weeks. Pt will meet with community support center on 6/26 and has follow-up with [facility] on 6/27. He denies any questions or concerns at this time.

## 2023-06-23 ENCOUNTER — DOCUMENTATION ONLY (OUTPATIENT)
Dept: ANTICOAGULATION | Facility: CLINIC | Age: 63
End: 2023-06-23

## 2023-06-23 DIAGNOSIS — Z95.811 LVAD (LEFT VENTRICULAR ASSIST DEVICE) PRESENT (H): ICD-10-CM

## 2023-06-23 DIAGNOSIS — I50.22 CHRONIC SYSTOLIC CONGESTIVE HEART FAILURE (H): Primary | ICD-10-CM

## 2023-06-23 DIAGNOSIS — I50.20 HEART FAILURE WITH REDUCED EJECTION FRACTION, NYHA CLASS III (H): ICD-10-CM

## 2023-06-23 DIAGNOSIS — Z95.811 LEFT VENTRICULAR ASSIST DEVICE PRESENT (H): ICD-10-CM

## 2023-06-23 NOTE — PROGRESS NOTES
ANTICOAGULATION  MANAGEMENT: Discharge Review    Dandy Sands chart reviewed for anticoagulation continuity of care    Hospital Admission on 6/8-6/20/23 for N/V/D.    Discharge disposition: Home    Results:    No results for input(s): INR, [id number], F2, ALMWH, AAUFH in the last 168 hours.  Anticoagulation inpatient management:     less warfarin administered than maintenance regimen    Anticoagulation discharge instructions:     Warfarin dosing: home regimen continued   Bridging: No   INR goal change: No      Medication changes affecting anticoagulation: Yes: omeprazole-every day IV Vanco d/c'd 6/21/23 and changed to dalbavancin-(no affect on inr from abx but omeprazole could cause INR to increase    Additional factors affecting anticoagulation: No     PLAN     Agree with discharge plan for follow up on 6/26/23    Patient not contacted    Anticoagulation Calendar updated    Kiki Dave RN

## 2023-06-26 ENCOUNTER — TELEPHONE (OUTPATIENT)
Dept: ANTICOAGULATION | Facility: CLINIC | Age: 63
End: 2023-06-26

## 2023-06-26 NOTE — TELEPHONE ENCOUNTER
Spoke with Dandy. He is going to have his INR checked tomorrow, 6/27/23 when he sees provider in [place].  In basket message sent to look for result.  He will take 6.25 mg of warfarin today.  Leatha Abbott RN

## 2023-06-27 ENCOUNTER — ANTICOAGULATION THERAPY VISIT (OUTPATIENT)
Dept: ANTICOAGULATION | Facility: CLINIC | Age: 63
End: 2023-06-27

## 2023-06-27 ENCOUNTER — TRANSFERRED RECORDS (OUTPATIENT)
Dept: HEALTH INFORMATION MANAGEMENT | Facility: CLINIC | Age: 63
End: 2023-06-27

## 2023-06-27 DIAGNOSIS — Z95.811 LVAD (LEFT VENTRICULAR ASSIST DEVICE) PRESENT (H): ICD-10-CM

## 2023-06-27 DIAGNOSIS — I50.20 HEART FAILURE WITH REDUCED EJECTION FRACTION, NYHA CLASS III (H): ICD-10-CM

## 2023-06-27 DIAGNOSIS — Z95.811 LEFT VENTRICULAR ASSIST DEVICE PRESENT (H): ICD-10-CM

## 2023-06-27 DIAGNOSIS — I50.22 CHRONIC SYSTOLIC CONGESTIVE HEART FAILURE (H): Primary | ICD-10-CM

## 2023-06-27 LAB — INR (EXTERNAL): 1.4 (ref 0.9–1.1)

## 2023-06-27 NOTE — PROGRESS NOTES
ANTICOAGULATION MANAGEMENT     Dandy Sands 62 year old male is on warfarin with subtherapeutic INR result. (Goal INR 2.0-3.0)    Recent labs: (last 7 days)     06/27/23  0000   INR 1.4*       ASSESSMENT       Source(s): Chart Review and Patient/Caregiver Call       Warfarin doses taken: Missed dose(s) may be affecting INR    Diet: No new diet changes identified    Medication/supplement changes: None noted    New illness, injury, or hospitalization: Yes: diarrhea- 3-4x a day    Signs or symptoms of bleeding or clotting: No    Previous result: Subtherapeutic    Additional findings: going to the doctor tomorrow- thus far cdiff neg. Pt fell asleep and missed a couple doses- he is going to try to put his warfarin in with is dinner time pills and see if that helps         PLAN     Recommended plan for temporary change(s) affecting INR     Dosing Instructions: booster dose then continue your current warfarin dose with next INR in 3 days       Summary  As of 6/27/2023    Full warfarin instructions:  6/27: 10 mg; Otherwise 5 mg every Sun, Tue, Thu; 6.25 mg all other days   Next INR check:  6/30/2023             Telephone call with Dandy who verbalizes understanding and agrees to plan and who agrees to plan and repeated back plan correctly  Sent [employer] message with dosing and follow up instructions    Patient using outside facility for labs    Education provided:     Goal range and lab monitoring: goal range and significance of current result    Contact [phone] with any changes, questions or concerns.     Plan made with [facility] Pharmacist Radha Dave, RN  Anticoagulation Clinic  6/27/2023    _______________________________________________________________________     Anticoagulation Episode Summary     Current INR goal:  2.0-3.0   TTR:  55.7 % (7.9 mo)   Target end date:  Indefinite   Send INR reminders to:  ANTICOAG LVAD    Indications    Chronic systolic congestive heart failure (H) [I50.22]  LVAD  (left ventricular assist device) present (H) [Z95.811]  Heart failure with reduced ejection fraction  NYHA class III (H) [I50.20]  Left ventricular assist device present (H) [Z95.811]           Comments:  Follow VAD Anticoag protocol:Yes: HeartMate 3   Bridging: No bridging epistaxis.   Date VAD placed: 7/8/22         Anticoagulation Care Providers     Provider Role Specialty Phone number    Kelly Lora MD Referring Cardiovascular Disease [phone]

## 2023-06-28 ENCOUNTER — ANCILLARY PROCEDURE (OUTPATIENT)
Dept: CARDIOLOGY | Facility: CLINIC | Age: 63
End: 2023-06-28
Attending: INTERNAL MEDICINE
Payer: COMMERCIAL

## 2023-06-28 DIAGNOSIS — I50.22 CHRONIC SYSTOLIC CONGESTIVE HEART FAILURE (H): ICD-10-CM

## 2023-06-28 DIAGNOSIS — Z95.811 LVAD (LEFT VENTRICULAR ASSIST DEVICE) PRESENT (H): ICD-10-CM

## 2023-06-28 PROCEDURE — 93296 REM INTERROG EVL PM/IDS: CPT

## 2023-06-28 PROCEDURE — 93295 DEV INTERROG REMOTE 1/2/MLT: CPT | Performed by: INTERNAL MEDICINE

## 2023-06-29 LAB
MDC_IDC_EPISODE_DTM: NORMAL
MDC_IDC_EPISODE_DTM: NORMAL
MDC_IDC_EPISODE_DURATION: 1 S
MDC_IDC_EPISODE_DURATION: NORMAL S
MDC_IDC_EPISODE_ID: 63
MDC_IDC_EPISODE_ID: 64
MDC_IDC_EPISODE_TYPE: NORMAL
MDC_IDC_EPISODE_TYPE: NORMAL
MDC_IDC_LEAD_IMPLANT_DT: NORMAL
MDC_IDC_LEAD_LOCATION: NORMAL
MDC_IDC_LEAD_LOCATION_DETAIL_1: NORMAL
MDC_IDC_LEAD_MFG: NORMAL
MDC_IDC_LEAD_MODEL: NORMAL
MDC_IDC_LEAD_POLARITY_TYPE: NORMAL
MDC_IDC_LEAD_SERIAL: NORMAL
MDC_IDC_LEAD_SPECIAL_FUNCTION: NORMAL
MDC_IDC_MSMT_BATTERY_DTM: NORMAL
MDC_IDC_MSMT_BATTERY_REMAINING_LONGEVITY: 45 MO
MDC_IDC_MSMT_BATTERY_RRT_TRIGGER: 2.73
MDC_IDC_MSMT_BATTERY_STATUS: NORMAL
MDC_IDC_MSMT_BATTERY_VOLTAGE: 2.97 V
MDC_IDC_MSMT_CAP_CHARGE_DTM: NORMAL
MDC_IDC_MSMT_CAP_CHARGE_ENERGY: 18 J
MDC_IDC_MSMT_CAP_CHARGE_TIME: 4.06
MDC_IDC_MSMT_CAP_CHARGE_TYPE: NORMAL
MDC_IDC_MSMT_LEADCHNL_RA_IMPEDANCE_VALUE: 342 OHM
MDC_IDC_MSMT_LEADCHNL_RA_PACING_THRESHOLD_AMPLITUDE: 0.5 V
MDC_IDC_MSMT_LEADCHNL_RA_PACING_THRESHOLD_PULSEWIDTH: 0.4 MS
MDC_IDC_MSMT_LEADCHNL_RA_SENSING_INTR_AMPL: 1 MV
MDC_IDC_MSMT_LEADCHNL_RV_IMPEDANCE_VALUE: 361 OHM
MDC_IDC_MSMT_LEADCHNL_RV_IMPEDANCE_VALUE: 456 OHM
MDC_IDC_MSMT_LEADCHNL_RV_PACING_THRESHOLD_AMPLITUDE: 0.38 V
MDC_IDC_MSMT_LEADCHNL_RV_PACING_THRESHOLD_PULSEWIDTH: 0.4 MS
MDC_IDC_MSMT_LEADCHNL_RV_SENSING_INTR_AMPL: 5.3 MV
MDC_IDC_PG_IMPLANT_DTM: NORMAL
MDC_IDC_PG_MFG: NORMAL
MDC_IDC_PG_MODEL: NORMAL
MDC_IDC_PG_SERIAL: NORMAL
MDC_IDC_PG_TYPE: NORMAL
MDC_IDC_SESS_CLINIC_NAME: NORMAL
MDC_IDC_SESS_DTM: NORMAL
MDC_IDC_SESS_TYPE: NORMAL
MDC_IDC_SET_BRADY_AT_MODE_SWITCH_RATE: 171 {BEATS}/MIN
MDC_IDC_SET_BRADY_HYSTRATE: NORMAL
MDC_IDC_SET_BRADY_LOWRATE: 50 {BEATS}/MIN
MDC_IDC_SET_BRADY_MAX_SENSOR_RATE: 115 {BEATS}/MIN
MDC_IDC_SET_BRADY_MAX_TRACKING_RATE: 130 {BEATS}/MIN
MDC_IDC_SET_BRADY_MODE: NORMAL
MDC_IDC_SET_BRADY_PAV_DELAY_LOW: 350 MS
MDC_IDC_SET_BRADY_SAV_DELAY_LOW: 350 MS
MDC_IDC_SET_LEADCHNL_RA_PACING_AMPLITUDE: 1.5 V
MDC_IDC_SET_LEADCHNL_RA_PACING_ANODE_ELECTRODE_1: NORMAL
MDC_IDC_SET_LEADCHNL_RA_PACING_ANODE_LOCATION_1: NORMAL
MDC_IDC_SET_LEADCHNL_RA_PACING_CAPTURE_MODE: NORMAL
MDC_IDC_SET_LEADCHNL_RA_PACING_CATHODE_ELECTRODE_1: NORMAL
MDC_IDC_SET_LEADCHNL_RA_PACING_CATHODE_LOCATION_1: NORMAL
MDC_IDC_SET_LEADCHNL_RA_PACING_POLARITY: NORMAL
MDC_IDC_SET_LEADCHNL_RA_PACING_PULSEWIDTH: 0.4 MS
MDC_IDC_SET_LEADCHNL_RA_SENSING_ANODE_ELECTRODE_1: NORMAL
MDC_IDC_SET_LEADCHNL_RA_SENSING_ANODE_LOCATION_1: NORMAL
MDC_IDC_SET_LEADCHNL_RA_SENSING_CATHODE_ELECTRODE_1: NORMAL
MDC_IDC_SET_LEADCHNL_RA_SENSING_CATHODE_LOCATION_1: NORMAL
MDC_IDC_SET_LEADCHNL_RA_SENSING_POLARITY: NORMAL
MDC_IDC_SET_LEADCHNL_RA_SENSING_SENSITIVITY: 0.15 MV
MDC_IDC_SET_LEADCHNL_RV_PACING_AMPLITUDE: 2 V
MDC_IDC_SET_LEADCHNL_RV_PACING_ANODE_ELECTRODE_1: NORMAL
MDC_IDC_SET_LEADCHNL_RV_PACING_ANODE_LOCATION_1: NORMAL
MDC_IDC_SET_LEADCHNL_RV_PACING_CAPTURE_MODE: NORMAL
MDC_IDC_SET_LEADCHNL_RV_PACING_CATHODE_ELECTRODE_1: NORMAL
MDC_IDC_SET_LEADCHNL_RV_PACING_CATHODE_LOCATION_1: NORMAL
MDC_IDC_SET_LEADCHNL_RV_PACING_POLARITY: NORMAL
MDC_IDC_SET_LEADCHNL_RV_PACING_PULSEWIDTH: 0.4 MS
MDC_IDC_SET_LEADCHNL_RV_SENSING_ANODE_ELECTRODE_1: NORMAL
MDC_IDC_SET_LEADCHNL_RV_SENSING_ANODE_LOCATION_1: NORMAL
MDC_IDC_SET_LEADCHNL_RV_SENSING_CATHODE_ELECTRODE_1: NORMAL
MDC_IDC_SET_LEADCHNL_RV_SENSING_CATHODE_LOCATION_1: NORMAL
MDC_IDC_SET_LEADCHNL_RV_SENSING_POLARITY: NORMAL
MDC_IDC_SET_LEADCHNL_RV_SENSING_SENSITIVITY: 0.3 MV
MDC_IDC_SET_ZONE_DETECTION_BEATS_DENOMINATOR: 40 {BEATS}
MDC_IDC_SET_ZONE_DETECTION_BEATS_NUMERATOR: 30 {BEATS}
MDC_IDC_SET_ZONE_DETECTION_INTERVAL: 280 MS
MDC_IDC_SET_ZONE_DETECTION_INTERVAL: 320 MS
MDC_IDC_SET_ZONE_DETECTION_INTERVAL: 350 MS
MDC_IDC_SET_ZONE_DETECTION_INTERVAL: 350 MS
MDC_IDC_SET_ZONE_DETECTION_INTERVAL: 400 MS
MDC_IDC_SET_ZONE_TYPE: NORMAL
MDC_IDC_STAT_AT_BURDEN_PERCENT: 0.2 %
MDC_IDC_STAT_AT_DTM_END: NORMAL
MDC_IDC_STAT_AT_DTM_START: NORMAL
MDC_IDC_STAT_BRADY_AP_VP_PERCENT: 0.02 %
MDC_IDC_STAT_BRADY_AP_VS_PERCENT: 0.08 %
MDC_IDC_STAT_BRADY_AS_VP_PERCENT: 0.26 %
MDC_IDC_STAT_BRADY_AS_VS_PERCENT: 99.63 %
MDC_IDC_STAT_BRADY_DTM_END: NORMAL
MDC_IDC_STAT_BRADY_DTM_START: NORMAL
MDC_IDC_STAT_BRADY_RA_PERCENT_PACED: 0.1 %
MDC_IDC_STAT_BRADY_RV_PERCENT_PACED: 0.28 %
MDC_IDC_STAT_EPISODE_RECENT_COUNT: 0
MDC_IDC_STAT_EPISODE_RECENT_COUNT: 1
MDC_IDC_STAT_EPISODE_RECENT_COUNT: 1
MDC_IDC_STAT_EPISODE_RECENT_COUNT_DTM_END: NORMAL
MDC_IDC_STAT_EPISODE_RECENT_COUNT_DTM_START: NORMAL
MDC_IDC_STAT_EPISODE_TOTAL_COUNT: 0
MDC_IDC_STAT_EPISODE_TOTAL_COUNT: 1
MDC_IDC_STAT_EPISODE_TOTAL_COUNT: 1
MDC_IDC_STAT_EPISODE_TOTAL_COUNT: 13
MDC_IDC_STAT_EPISODE_TOTAL_COUNT: 37
MDC_IDC_STAT_EPISODE_TOTAL_COUNT_DTM_END: NORMAL
MDC_IDC_STAT_EPISODE_TOTAL_COUNT_DTM_START: NORMAL
MDC_IDC_STAT_EPISODE_TYPE: NORMAL
MDC_IDC_STAT_TACHYTHERAPY_ATP_DELIVERED_RECENT: 0
MDC_IDC_STAT_TACHYTHERAPY_ATP_DELIVERED_TOTAL: 0
MDC_IDC_STAT_TACHYTHERAPY_RECENT_DTM_END: NORMAL
MDC_IDC_STAT_TACHYTHERAPY_RECENT_DTM_START: NORMAL
MDC_IDC_STAT_TACHYTHERAPY_SHOCKS_ABORTED_RECENT: 0
MDC_IDC_STAT_TACHYTHERAPY_SHOCKS_ABORTED_TOTAL: 0
MDC_IDC_STAT_TACHYTHERAPY_SHOCKS_DELIVERED_RECENT: 0
MDC_IDC_STAT_TACHYTHERAPY_SHOCKS_DELIVERED_TOTAL: 1
MDC_IDC_STAT_TACHYTHERAPY_TOTAL_DTM_END: NORMAL
MDC_IDC_STAT_TACHYTHERAPY_TOTAL_DTM_START: NORMAL

## 2023-06-30 ENCOUNTER — CARE COORDINATION (OUTPATIENT)
Dept: CARDIOLOGY | Facility: CLINIC | Age: 63
End: 2023-06-30

## 2023-07-05 LAB — INR (EXTERNAL): 1.6 (ref 0.9–1.1)

## 2023-07-06 ENCOUNTER — CARE COORDINATION (OUTPATIENT)
Dept: CARDIOLOGY | Facility: CLINIC | Age: 63
End: 2023-07-06

## 2023-07-06 ENCOUNTER — ANTICOAGULATION THERAPY VISIT (OUTPATIENT)
Dept: ANTICOAGULATION | Facility: CLINIC | Age: 63
End: 2023-07-06

## 2023-07-06 DIAGNOSIS — I50.22 CHRONIC SYSTOLIC CONGESTIVE HEART FAILURE (H): Primary | ICD-10-CM

## 2023-07-06 DIAGNOSIS — I50.20 HEART FAILURE WITH REDUCED EJECTION FRACTION, NYHA CLASS III (H): ICD-10-CM

## 2023-07-06 DIAGNOSIS — Z95.811 LVAD (LEFT VENTRICULAR ASSIST DEVICE) PRESENT (H): ICD-10-CM

## 2023-07-06 DIAGNOSIS — Z95.811 LEFT VENTRICULAR ASSIST DEVICE PRESENT (H): ICD-10-CM

## 2023-07-06 NOTE — PROGRESS NOTES
VAD coordinator contacted by Anne Gilliland NP at [facility].   Pt called cardiology clinic at [facility] to report lump just medial to DLES. NP assessed site, which is slightly worrisome for abcess. Relayed that pt had sent message to [facility] VAD team yesterday and we instructed pt to review with his ID provider that he was scheduled to see today. Pt did not bring this up with his ID provider.  NP will order CT scan to evaluate as pt has chronic DL infection as well as ongoing infection at base of sternum. Pt is on IV abx, Dalbavancin q 2 weeks. NP will send message to pt's local ID provider to update/notify of new area of redness/induration.   NP sent below picture for reference. Redness seems to be along an old chest tube site scar.   After pt completes CT, will get images pushed to [facility] for review by cardiologist and surgeon.

## 2023-07-06 NOTE — PROGRESS NOTES
"  ANTICOAGULATION MANAGEMENT     Dandy CHAPA Shavon 62 year old male is on warfarin with subtherapeutic INR result. (Goal INR 2.0-3.0)    Recent labs: (last 7 days)     07/05/23  1415   INR 1.6*       ASSESSMENT       Source(s): Chart Review and Patient/Caregiver Call       Warfarin doses taken: Booster dose(s) recently taken which may be affecting INR    Diet: No new diet changes identified          Medication/supplement changes: ID OV 7/5/23:     Recommendations:  Continue dalbavancin 1500 mg IV every 2 weeks at [facility], length of therapy is TBD.    New illness, injury, or hospitalization: Yes: patient had a \"small fall\" and ended up with an injury near his driveline insertion site that he reports as being tender and sore to the touch.   [employer] message 7/5/23: small bump/bruise near my Dads driveline site during his bandage change today.     Signs or symptoms of bleeding or clotting: Yes: bruise/bump at site of injury    Previous result: Subtherapeutic    Additional findings: patient received a call from [facility] as we were discussing results, they want him to be seen today. For now we will do a booster dose with a potential need to increase maintenance dose next week depending on outcome of injury and potential new meds         PLAN     Recommended plan for temporary change(s) and ongoing change(s) affecting INR     Dosing Instructions: booster dose then continue your current warfarin dose with next INR in 1 week       Summary  As of 7/6/2023    Full warfarin instructions:  7/6: 10 mg; Otherwise 5 mg every Sun, Tue, Thu; 6.25 mg all other days   Next INR check:  7/12/2023             Telephone call with Dandy who agrees to plan and repeated back plan correctly    Patient using outside facility for labs    Education provided:     Goal range and lab monitoring: goal range and significance of current result and Importance of following up at instructed interval    Symptom monitoring: monitoring for bleeding signs and symptoms " and monitoring for clotting signs and symptoms    Contact [phone] with any changes, questions or concerns.     Plan made per [facility] anticoagulation protocol and per LVAD protocol    LORENA MOSES RN  Anticoagulation Clinic  7/6/2023    _______________________________________________________________________     Anticoagulation Episode Summary     Current INR goal:  2.0-3.0   TTR:  53.8 % (8.2 mo)   Target end date:  Indefinite   Send INR reminders to:  ANTICOAG LVAD    Indications    Chronic systolic congestive heart failure (H) [I50.22]  LVAD (left ventricular assist device) present (H) [Z95.811]  Heart failure with reduced ejection fraction  NYHA class III (H) [I50.20]  Left ventricular assist device present (H) [Z95.811]           Comments:  Follow VAD Anticoag protocol:Yes: HeartMate 3   Bridging: No bridging epistaxis.   Date VAD placed: 7/8/22         Anticoagulation Care Providers     Provider Role Specialty Phone number    Kelly Lora MD Referring Cardiovascular Disease [phone]

## 2023-07-11 ENCOUNTER — CARE COORDINATION (OUTPATIENT)
Dept: CARDIOLOGY | Facility: CLINIC | Age: 63
End: 2023-07-11

## 2023-07-11 NOTE — PROGRESS NOTES
Pt had CT scan chest/abd/pelvis completed yesterday at [facility], to assess small bump and area of redness just proximal and medial to DLES.   Ordering NP sent message that there were no new findings on CT.   Called pt to check in. Pt did not answer. Left voicemail.

## 2023-07-12 ENCOUNTER — CARE COORDINATION (OUTPATIENT)
Dept: CARDIOLOGY | Facility: CLINIC | Age: 63
End: 2023-07-12

## 2023-07-12 NOTE — PROGRESS NOTES
Pt called and left voicemail for VAD coordinator. Reported he is having more pain and drainage at LVAD drive line exit site. He has called his ID provider and is trying to get an appt.   On review of care everywhere this am, pt has an appt with ID provider today at 1030. They will assess site and culture if needed.

## 2023-07-13 ENCOUNTER — TELEPHONE (OUTPATIENT)
Dept: ANTICOAGULATION | Facility: CLINIC | Age: 63
End: 2023-07-13

## 2023-07-13 NOTE — TELEPHONE ENCOUNTER
ANTICOAGULATION     Dandy Sands is overdue for INR check.       Spoke with Dandy who uses an outside lab. He states he can go to the lab tomorrow.     Tanja Mariee RN

## 2023-07-14 LAB — INR (EXTERNAL): 1.8 (ref 0.9–1.1)

## 2023-07-18 ENCOUNTER — ANTICOAGULATION THERAPY VISIT (OUTPATIENT)
Dept: ANTICOAGULATION | Facility: CLINIC | Age: 63
End: 2023-07-18

## 2023-07-18 DIAGNOSIS — Z95.811 LEFT VENTRICULAR ASSIST DEVICE PRESENT (H): ICD-10-CM

## 2023-07-18 DIAGNOSIS — I50.22 CHRONIC SYSTOLIC CONGESTIVE HEART FAILURE (H): Primary | ICD-10-CM

## 2023-07-18 DIAGNOSIS — I50.20 HEART FAILURE WITH REDUCED EJECTION FRACTION, NYHA CLASS III (H): ICD-10-CM

## 2023-07-18 DIAGNOSIS — Z95.811 LVAD (LEFT VENTRICULAR ASSIST DEVICE) PRESENT (H): ICD-10-CM

## 2023-07-18 NOTE — PROGRESS NOTES
ANTICOAGULATION MANAGEMENT     Dandy Sands 62 year old male is on warfarin with subtherapeutic INR result. (Goal INR 2.0-3.0)    Recent labs: (last 7 days)     07/14/23  0000   INR 1.8*       ASSESSMENT       Source(s): Chart Review and Patient/Caregiver Call       Warfarin doses taken: Booster dose(s) recently taken which may be affecting INR    Diet: No new diet changes identified    Medication/supplement changes: None noted    New illness, injury, or hospitalization: No    Signs or symptoms of bleeding or clotting: No    Previous result: Subtherapeutic    Additional findings: None         PLAN     Recommended plan for no diet, medication or health factor changes affecting INR     Dosing Instructions: Increase your warfarin dose (6.2% change) with next INR in 1 week -Dandy will recheck early next week    Summary  As of 7/18/2023    Full warfarin instructions:  5 mg every Mon; 6.25 mg all other days   Next INR check:  7/24/2023             Telephone call with Dandy who agrees to plan and repeated back plan correctly    Patient using outside facility for labs    Education provided:     Goal range and lab monitoring: goal range and significance of current result and Importance of following up at instructed interval    Contact [phone] with any changes, questions or concerns.     Plan made per [facility] anticoagulation protocol and per LVAD protocol    LORENA MOSES RN  Anticoagulation Clinic  7/18/2023    _______________________________________________________________________     Anticoagulation Episode Summary     Current INR goal:  2.0-3.0   TTR:  52.0 % (8.5 mo)   Target end date:  Indefinite   Send INR reminders to:  ANTICOAG LVAD    Indications    Chronic systolic congestive heart failure (H) [I50.22]  LVAD (left ventricular assist device) present (H) [Z95.811]  Heart failure with reduced ejection fraction  NYHA class III (H) [I50.20]  Left ventricular assist device present (H) [Z95.811]           Comments:   Follow VAD Anticoag protocol:Yes: HeartMate 3   Bridging: No bridging epistaxis.   Date VAD placed: 7/8/22         Anticoagulation Care Providers     Provider Role Specialty Phone number    Kelly Lora MD Referring Cardiovascular Disease [phone]

## 2023-07-24 ENCOUNTER — TELEPHONE (OUTPATIENT)
Dept: ANTICOAGULATION | Facility: CLINIC | Age: 63
End: 2023-07-24

## 2023-07-24 NOTE — TELEPHONE ENCOUNTER
ANTICOAGULATION     Dandy Sands is overdue for INR check.      Spoke with Dandy who states he will go in tomorrow morning (7/25/23) for an INR.  He uses an outside lab.     Leatha Abbott RN

## 2023-07-25 LAB — INR (EXTERNAL): 2.2 (ref 0.9–1.1)

## 2023-07-26 ENCOUNTER — ANTICOAGULATION THERAPY VISIT (OUTPATIENT)
Dept: ANTICOAGULATION | Facility: CLINIC | Age: 63
End: 2023-07-26

## 2023-07-26 DIAGNOSIS — Z95.811 LEFT VENTRICULAR ASSIST DEVICE PRESENT (H): ICD-10-CM

## 2023-07-26 DIAGNOSIS — Z95.811 LVAD (LEFT VENTRICULAR ASSIST DEVICE) PRESENT (H): ICD-10-CM

## 2023-07-26 DIAGNOSIS — I50.20 HEART FAILURE WITH REDUCED EJECTION FRACTION, NYHA CLASS III (H): ICD-10-CM

## 2023-07-26 DIAGNOSIS — I50.22 CHRONIC SYSTOLIC CONGESTIVE HEART FAILURE (H): Primary | ICD-10-CM

## 2023-07-26 NOTE — PROGRESS NOTES
ANTICOAGULATION MANAGEMENT     Dandy CHAPA Shavon 62 year old male is on warfarin with therapeutic INR result. (Goal INR 2.0-3.0)    Recent labs: (last 7 days)     07/25/23  0000   INR 2.2*       ASSESSMENT     Source(s): Chart Review and Patient/Caregiver Call     Warfarin doses taken: Warfarin taken as instructed  Diet: No new diet changes identified  Medication/supplement changes: None noted  New illness, injury, or hospitalization: No  Signs or symptoms of bleeding or clotting: No  Previous result: Subtherapeutic  Additional findings:  ongoing maintenance dose increases       PLAN     Recommended plan for ongoing change(s) affecting INR     Dosing Instructions: Continue your current warfarin dose with next INR in 2 weeks       Summary  As of 7/26/2023      Full warfarin instructions:  5 mg every Mon; 6.25 mg all other days   Next INR check:  8/8/2023               Telephone call with Dandy who agrees to plan and repeated back plan correctly    Patient using outside facility for labs    Education provided:   Goal range and lab monitoring: goal range and significance of current result and Importance of following up at instructed interval  Contact [phone] with any changes, questions or concerns.     Plan made per [facility] anticoagulation protocol and per LVAD protocol    LORENA MOSES RN  Anticoagulation Clinic  7/26/2023    _______________________________________________________________________     Anticoagulation Episode Summary       Current INR goal:  2.0-3.0   TTR:  51.9 % (8.8 mo)   Target end date:  Indefinite   Send INR reminders to:  ANTICOAG LVAD    Indications    Chronic systolic congestive heart failure (H) [I50.22]  LVAD (left ventricular assist device) present (H) [Z95.811]  Heart failure with reduced ejection fraction  NYHA class III (H) [I50.20]  Left ventricular assist device present (H) [Z95.811]             Comments:  Follow VAD Anticoag protocol:Yes: HeartMate 3   Bridging: No bridging  epistaxis.   Date VAD placed: 7/8/22             Anticoagulation Care Providers       Provider Role Specialty Phone number    Kelly Lora MD Referring Cardiovascular Disease [phone]

## 2023-07-30 NOTE — PLAN OF CARE
Observation goals  PRIOR TO DISCHARGE        Comments:   -diagnostic tests and consults completed and resulted  Not met  -vital signs normal or at patient baseline  Met  -adequate pain control on oral analgesics  Met  -returns to baseline functional status  Not met  -safe disposition plan has been identified  Not Met  -evaluation and clearance by neurosurgery  Met  Nurse to notify provider when observation goals have been met and patient is ready for discharge.                FOCUS/GOAL  Medical management    ASSESSMENT, INTERVENTIONS AND CONTINUING PLAN FOR GOAL:  Pt is seen sleeping mostly during safety round checks. No sign of pain or sob.  Use urinal for voiding independently at bed side during the night. Assisted to the BR early this morning. Cont of B/B. Independent w/ franklin cares and clothing management. LLVAD parameters is w/in normal. Cont w/ POC.  Goal Outcome Evaluation:    Plan of Care Reviewed With: patient     Overall Patient Progress: no change

## 2023-08-01 ENCOUNTER — CARE COORDINATION (OUTPATIENT)
Dept: CARDIOLOGY | Facility: CLINIC | Age: 63
End: 2023-08-01

## 2023-08-08 ENCOUNTER — TELEPHONE (OUTPATIENT)
Dept: ANTICOAGULATION | Facility: CLINIC | Age: 63
End: 2023-08-08

## 2023-08-08 NOTE — TELEPHONE ENCOUNTER
ANTICOAGULATION     Dandy Sands is overdue for INR check.      Spoke with Dandy and patient will have labs checked at next office visit on tomorrow 8/9/23.    Chon Tinsley, RN

## 2023-08-10 ENCOUNTER — ANTICOAGULATION THERAPY VISIT (OUTPATIENT)
Dept: ANTICOAGULATION | Facility: CLINIC | Age: 63
End: 2023-08-10

## 2023-08-10 DIAGNOSIS — Z95.811 LVAD (LEFT VENTRICULAR ASSIST DEVICE) PRESENT (H): ICD-10-CM

## 2023-08-10 DIAGNOSIS — Z95.811 LEFT VENTRICULAR ASSIST DEVICE PRESENT (H): ICD-10-CM

## 2023-08-10 DIAGNOSIS — I50.22 CHRONIC SYSTOLIC CONGESTIVE HEART FAILURE (H): Primary | ICD-10-CM

## 2023-08-10 DIAGNOSIS — I50.20 HEART FAILURE WITH REDUCED EJECTION FRACTION, NYHA CLASS III (H): ICD-10-CM

## 2023-08-10 LAB — INR (EXTERNAL): 1.5 (ref 0.9–1.1)

## 2023-08-10 NOTE — PROGRESS NOTES
ANTICOAGULATION MANAGEMENT     Dandy Sands 62 year old male is on warfarin with subtherapeutic INR result. (Goal INR 2.0-3.0)    Recent labs: (last 7 days)     08/10/23  0000   INR 1.5*       ASSESSMENT     Source(s): Chart Review and Patient/Caregiver Call     Warfarin doses taken: Warfarin taken as instructed  Diet:  Patient reports his appetite has greatly improved  Medication/supplement changes:  Patient started IV Dalvance Q 2 weeks.  According to the literature this is friendly with warfarin  New illness, injury, or hospitalization: No  Signs or symptoms of bleeding or clotting: No  Previous result: Therapeutic last visit; previously outside of goal range  Additional findings:  Nicholas reports that infection site has greatly improved       PLAN     Recommended plan for ongoing change(s) affecting INR     Dosing Instructions: Increase your warfarin dose (8.8% change) with next INR in 1 week       Summary  As of 8/10/2023      Full warfarin instructions:  7.5 mg every Mon, Thu; 6.25 mg all other days   Next INR check:                 Telephone call with Ryan who verbalizes understanding and agrees to plan and who agrees to plan and repeated back plan correctly    Patient using outside facility for labs    Education provided:   Taking warfarin: Importance of taking warfarin as instructed  Goal range and lab monitoring: goal range and significance of current result and Importance of therapeutic range    Plan made per [facility] anticoagulation protocol and LVAD protocol    Holli Silva RN  Anticoagulation Clinic  8/10/2023    _______________________________________________________________________     Anticoagulation Episode Summary       Current INR goal:  2.0-3.0   TTR:  50.6 % (9.4 mo)   Target end date:  Indefinite   Send INR reminders to:  ANTICOAG LVAD    Indications    Chronic systolic congestive heart failure (H) [I50.22]  LVAD (left ventricular assist device) present (H) [Z95.811]  Heart  failure with reduced ejection fraction  NYHA class III (H) [I50.20]  Left ventricular assist device present (H) [Z95.811]             Comments:  Follow VAD Anticoag protocol:Yes: HeartMate 3   Bridging: No bridging epistaxis.   Date VAD placed: 7/8/22             Anticoagulation Care Providers       Provider Role Specialty Phone number    Kelly Lora MD Referring Cardiovascular Disease [phone]

## 2023-08-14 ENCOUNTER — CARE COORDINATION (OUTPATIENT)
Dept: CARDIOLOGY | Facility: CLINIC | Age: 63
End: 2023-08-14

## 2023-08-16 ENCOUNTER — ANTICOAGULATION THERAPY VISIT (OUTPATIENT)
Dept: ANTICOAGULATION | Facility: CLINIC | Age: 63
End: 2023-08-16

## 2023-08-16 DIAGNOSIS — Z95.811 LVAD (LEFT VENTRICULAR ASSIST DEVICE) PRESENT (H): ICD-10-CM

## 2023-08-16 DIAGNOSIS — Z95.811 LEFT VENTRICULAR ASSIST DEVICE PRESENT (H): ICD-10-CM

## 2023-08-16 DIAGNOSIS — I50.22 CHRONIC SYSTOLIC CONGESTIVE HEART FAILURE (H): Primary | ICD-10-CM

## 2023-08-16 DIAGNOSIS — I50.20 HEART FAILURE WITH REDUCED EJECTION FRACTION, NYHA CLASS III (H): ICD-10-CM

## 2023-08-16 LAB — INR (EXTERNAL): 1.9 (ref 0.9–1.1)

## 2023-08-17 NOTE — PROGRESS NOTES
ANTICOAGULATION MANAGEMENT     Dandy Sands 62 year old male is on warfarin with subtherapeutic INR result. (Goal INR 2.0-3.0)    Recent labs: (last 7 days)     08/16/23  0943   INR 1.9*       ASSESSMENT     Source(s): Chart Review and Patient/Caregiver Call     Warfarin doses taken: Warfarin taken as instructed  Diet:  ongoing improved appetite  Medication/supplement changes:  started IV Dalvance Q 2 weeks (no interaction with warfarin)  New illness, injury, or hospitalization: No, infection site greatly improved.   Signs or symptoms of bleeding or clotting: No  Previous result: Subtherapeutic-maintenance dose increase last encounter  Additional findings: None       PLAN     Recommended plan for temporary change(s) affecting INR     Dosing Instructions: booster dose then continue your current warfarin dose with next INR in 1 week       Summary  As of 8/16/2023      Full warfarin instructions:  8/17: 10 mg; Otherwise 7.5 mg every Mon, Thu; 6.25 mg all other days   Next INR check:  8/24/2023               Telephone call with Dandy and son, Amos who agrees to plan and repeated back plan correctly    Patient using outside facility for labs-potential INR lab 8/22/23 at local clinic, otherwise he has labs scheduled at [facility] 8/24/23    Education provided:   Goal range and lab monitoring: goal range and significance of current result and Importance of following up at instructed interval  Interaction IS NOT anticipated between warfarin and IV abx  Symptom monitoring: monitoring for bleeding signs and symptoms and monitoring for clotting signs and symptoms  Contact [phone] with any changes, questions or concerns.     Plan made per [facility] anticoagulation protocol and per LVAD protocol    LORENA MOSES RN  Anticoagulation Clinic  8/17/2023    _______________________________________________________________________     Anticoagulation Episode Summary       Current INR goal:  2.0-3.0   TTR:  49.5 % (9.6 mo)   Target end  date:  Indefinite   Send INR reminders to:  ANTICOAG LVAD    Indications    Chronic systolic congestive heart failure (H) [I50.22]  LVAD (left ventricular assist device) present (H) [Z95.811]  Heart failure with reduced ejection fraction  NYHA class III (H) [I50.20]  Left ventricular assist device present (H) [Z95.811]             Comments:  Follow VAD Anticoag protocol:Yes: HeartMate 3   Bridging: No bridging epistaxis.   Date VAD placed: 7/8/22             Anticoagulation Care Providers       Provider Role Specialty Phone number    Kelly Lora MD Referring Cardiovascular Disease [phone]

## 2023-08-21 DIAGNOSIS — Z95.811 LEFT VENTRICULAR ASSIST DEVICE PRESENT (H): ICD-10-CM

## 2023-08-21 DIAGNOSIS — Z79.899 LONG TERM USE OF DRUG: Primary | ICD-10-CM

## 2023-08-21 DIAGNOSIS — I50.22 CHRONIC SYSTOLIC CONGESTIVE HEART FAILURE (H): ICD-10-CM

## 2023-08-24 ENCOUNTER — OFFICE VISIT (OUTPATIENT)
Dept: CARDIOLOGY | Facility: CLINIC | Age: 63
End: 2023-08-24
Attending: PHYSICIAN ASSISTANT
Payer: COMMERCIAL

## 2023-08-24 ENCOUNTER — OFFICE VISIT (OUTPATIENT)
Dept: INFECTIOUS DISEASES | Facility: CLINIC | Age: 63
End: 2023-08-24
Attending: INTERNAL MEDICINE
Payer: COMMERCIAL

## 2023-08-24 ENCOUNTER — OFFICE VISIT (OUTPATIENT)
Dept: CARDIOLOGY | Facility: CLINIC | Age: 63
End: 2023-08-24
Payer: COMMERCIAL

## 2023-08-24 ENCOUNTER — LAB (OUTPATIENT)
Dept: LAB | Facility: CLINIC | Age: 63
End: 2023-08-24
Attending: PHYSICIAN ASSISTANT

## 2023-08-24 ENCOUNTER — ANTICOAGULATION THERAPY VISIT (OUTPATIENT)
Dept: ANTICOAGULATION | Facility: CLINIC | Age: 63
End: 2023-08-24

## 2023-08-24 VITALS
WEIGHT: 160 LBS | BODY MASS INDEX: 25.06 KG/M2 | HEART RATE: 71 BPM | SYSTOLIC BLOOD PRESSURE: 71 MMHG | OXYGEN SATURATION: 100 %

## 2023-08-24 VITALS — HEART RATE: 62 BPM | BODY MASS INDEX: 23.18 KG/M2 | OXYGEN SATURATION: 99 % | WEIGHT: 148 LBS

## 2023-08-24 VITALS
OXYGEN SATURATION: 100 % | SYSTOLIC BLOOD PRESSURE: 71 MMHG | BODY MASS INDEX: 25.06 KG/M2 | HEART RATE: 71 BPM | WEIGHT: 160 LBS

## 2023-08-24 DIAGNOSIS — Z95.811 LEFT VENTRICULAR ASSIST DEVICE PRESENT (H): ICD-10-CM

## 2023-08-24 DIAGNOSIS — T82.7XXA INFECTION ASSOCIATED WITH DRIVELINE OF LEFT VENTRICULAR ASSIST DEVICE (LVAD) (H): Primary | ICD-10-CM

## 2023-08-24 DIAGNOSIS — D50.8 OTHER IRON DEFICIENCY ANEMIA: ICD-10-CM

## 2023-08-24 DIAGNOSIS — I50.22 CHRONIC SYSTOLIC CONGESTIVE HEART FAILURE (H): ICD-10-CM

## 2023-08-24 DIAGNOSIS — I50.20 HEART FAILURE WITH REDUCED EJECTION FRACTION, NYHA CLASS III (H): ICD-10-CM

## 2023-08-24 DIAGNOSIS — Z95.811 LVAD (LEFT VENTRICULAR ASSIST DEVICE) PRESENT (H): ICD-10-CM

## 2023-08-24 DIAGNOSIS — T82.7XXA INFECTION ASSOCIATED WITH DRIVELINE OF LEFT VENTRICULAR ASSIST DEVICE (LVAD) (H): ICD-10-CM

## 2023-08-24 DIAGNOSIS — Z79.899 LONG TERM USE OF DRUG: ICD-10-CM

## 2023-08-24 DIAGNOSIS — T82.110D ICD (IMPLANTABLE CARDIOVERTER-DEFIBRILLATOR) LEAD FAILURE, SUBSEQUENT ENCOUNTER: Primary | ICD-10-CM

## 2023-08-24 DIAGNOSIS — I50.22 CHRONIC SYSTOLIC CONGESTIVE HEART FAILURE (H): Primary | ICD-10-CM

## 2023-08-24 LAB
ALBUMIN SERPL BCG-MCNC: 4.2 G/DL (ref 3.5–5.2)
ALP SERPL-CCNC: 205 U/L (ref 40–129)
ALT SERPL W P-5'-P-CCNC: 22 U/L (ref 0–70)
ANION GAP SERPL CALCULATED.3IONS-SCNC: 12 MMOL/L (ref 7–15)
AST SERPL W P-5'-P-CCNC: 22 U/L (ref 0–45)
BASOPHILS # BLD AUTO: 0 10E3/UL (ref 0–0.2)
BASOPHILS NFR BLD AUTO: 1 %
BILIRUB SERPL-MCNC: 0.4 MG/DL
BUN SERPL-MCNC: 22.3 MG/DL (ref 8–23)
CALCIUM SERPL-MCNC: 9.3 MG/DL (ref 8.8–10.2)
CHLORIDE SERPL-SCNC: 106 MMOL/L (ref 98–107)
CREAT SERPL-MCNC: 1.12 MG/DL (ref 0.67–1.17)
CRP SERPL-MCNC: 18.2 MG/L
DEPRECATED HCO3 PLAS-SCNC: 21 MMOL/L (ref 22–29)
EOSINOPHIL # BLD AUTO: 0.1 10E3/UL (ref 0–0.7)
EOSINOPHIL NFR BLD AUTO: 2 %
ERYTHROCYTE [DISTWIDTH] IN BLOOD BY AUTOMATED COUNT: 18 % (ref 10–15)
GFR SERPL CREATININE-BSD FRML MDRD: 75 ML/MIN/1.73M2
GLUCOSE SERPL-MCNC: 98 MG/DL (ref 70–99)
GRAM STAIN RESULT: NORMAL
GRAM STAIN RESULT: NORMAL
HCT VFR BLD AUTO: 27.2 % (ref 40–53)
HGB BLD-MCNC: 8.6 G/DL (ref 13.3–17.7)
IMM GRANULOCYTES # BLD: 0 10E3/UL
IMM GRANULOCYTES NFR BLD: 0 %
INR PPP: 2.16 (ref 0.85–1.15)
LDH SERPL L TO P-CCNC: 153 U/L (ref 0–250)
LYMPHOCYTES # BLD AUTO: 0.7 10E3/UL (ref 0.8–5.3)
LYMPHOCYTES NFR BLD AUTO: 14 %
MCH RBC QN AUTO: 27.7 PG (ref 26.5–33)
MCHC RBC AUTO-ENTMCNC: 31.6 G/DL (ref 31.5–36.5)
MCV RBC AUTO: 88 FL (ref 78–100)
MONOCYTES # BLD AUTO: 0.6 10E3/UL (ref 0–1.3)
MONOCYTES NFR BLD AUTO: 11 %
NEUTROPHILS # BLD AUTO: 3.6 10E3/UL (ref 1.6–8.3)
NEUTROPHILS NFR BLD AUTO: 72 %
NRBC # BLD AUTO: 0 10E3/UL
NRBC BLD AUTO-RTO: 0 /100
PLATELET # BLD AUTO: 241 10E3/UL (ref 150–450)
POTASSIUM SERPL-SCNC: 3.6 MMOL/L (ref 3.4–5.3)
PROT SERPL-MCNC: 7.6 G/DL (ref 6.4–8.3)
RBC # BLD AUTO: 3.1 10E6/UL (ref 4.4–5.9)
SODIUM SERPL-SCNC: 139 MMOL/L (ref 136–145)
VANCOMYCIN SERPL-MCNC: <4 UG/ML
WBC # BLD AUTO: 5 10E3/UL (ref 4–11)

## 2023-08-24 PROCEDURE — 86832 HLA CLASS I HIGH DEFIN QUAL: CPT | Performed by: INTERNAL MEDICINE

## 2023-08-24 PROCEDURE — 82728 ASSAY OF FERRITIN: CPT | Performed by: PATHOLOGY

## 2023-08-24 PROCEDURE — 99214 OFFICE O/P EST MOD 30 MIN: CPT | Performed by: INTERNAL MEDICINE

## 2023-08-24 PROCEDURE — G0463 HOSPITAL OUTPT CLINIC VISIT: HCPCS | Mod: 25,27

## 2023-08-24 PROCEDURE — 36415 COLL VENOUS BLD VENIPUNCTURE: CPT | Performed by: PATHOLOGY

## 2023-08-24 PROCEDURE — 85610 PROTHROMBIN TIME: CPT | Performed by: PATHOLOGY

## 2023-08-24 PROCEDURE — 99214 OFFICE O/P EST MOD 30 MIN: CPT | Mod: 25 | Performed by: PHYSICIAN ASSISTANT

## 2023-08-24 PROCEDURE — 87102 FUNGUS ISOLATION CULTURE: CPT | Performed by: INTERNAL MEDICINE

## 2023-08-24 PROCEDURE — G0463 HOSPITAL OUTPT CLINIC VISIT: HCPCS | Mod: 25 | Performed by: PHYSICIAN ASSISTANT

## 2023-08-24 PROCEDURE — 86833 HLA CLASS II HIGH DEFIN QUAL: CPT | Performed by: INTERNAL MEDICINE

## 2023-08-24 PROCEDURE — 87077 CULTURE AEROBIC IDENTIFY: CPT | Performed by: INTERNAL MEDICINE

## 2023-08-24 PROCEDURE — 99000 SPECIMEN HANDLING OFFICE-LAB: CPT | Performed by: PATHOLOGY

## 2023-08-24 PROCEDURE — 87205 SMEAR GRAM STAIN: CPT | Performed by: INTERNAL MEDICINE

## 2023-08-24 PROCEDURE — G0463 HOSPITAL OUTPT CLINIC VISIT: HCPCS | Mod: 25,27 | Performed by: INTERNAL MEDICINE

## 2023-08-24 PROCEDURE — 87070 CULTURE OTHR SPECIMN AEROBIC: CPT | Performed by: INTERNAL MEDICINE

## 2023-08-24 PROCEDURE — 99215 OFFICE O/P EST HI 40 MIN: CPT

## 2023-08-24 PROCEDURE — 85025 COMPLETE CBC W/AUTO DIFF WBC: CPT | Performed by: PATHOLOGY

## 2023-08-24 PROCEDURE — 83540 ASSAY OF IRON: CPT | Performed by: PATHOLOGY

## 2023-08-24 PROCEDURE — 80053 COMPREHEN METABOLIC PANEL: CPT | Performed by: PATHOLOGY

## 2023-08-24 PROCEDURE — 87075 CULTR BACTERIA EXCEPT BLOOD: CPT | Performed by: INTERNAL MEDICINE

## 2023-08-24 PROCEDURE — 80202 ASSAY OF VANCOMYCIN: CPT | Performed by: PHYSICIAN ASSISTANT

## 2023-08-24 PROCEDURE — 83615 LACTATE (LD) (LDH) ENZYME: CPT | Performed by: PATHOLOGY

## 2023-08-24 PROCEDURE — 86140 C-REACTIVE PROTEIN: CPT | Performed by: PATHOLOGY

## 2023-08-24 PROCEDURE — 83550 IRON BINDING TEST: CPT | Performed by: PATHOLOGY

## 2023-08-24 PROCEDURE — 93750 INTERROGATION VAD IN PERSON: CPT | Performed by: PHYSICIAN ASSISTANT

## 2023-08-24 ASSESSMENT — PAIN SCALES - GENERAL
PAINLEVEL: MILD PAIN (3)
PAINLEVEL: MILD PAIN (3)
PAINLEVEL: MODERATE PAIN (4)

## 2023-08-24 NOTE — PROGRESS NOTES
ELECTROPHYSIOLOGY CLINIC VISIT    Assessment/Recommendations   Assessment/Plan:    Dandy Sands is a 62 year old male with past medical history signifcant for CAD s/p PCI, ICM LVEF 10-15%, s/p dual chamber CRTD 2006, s/p HM3 LVAD 7/28/22, drive line infection, SLE, antiphospholipid syndrome, HTN, HLD, and PE.     Chronic systolic heart failure, 2/2 to ICM s/p CRT-D (2006) s/p RV lead upgrade 1/13/23   s/p LVAD 7/8/12  GDMT management per VAD team as below:  1. ACEi/ARB/ARNi: Lisinopril increased to 15 mg in am and 10 mg in pm and hydralazine stopped today  2. BB: Deferred give recent LVAD implant with RV failure    3. Aldosterone antagonist: Deferred while optimizing medical therapy  4. STLG2i: defered given patients immunosuppression   5.  SCD prophylaxis: primary prevention CRT-D in 2006, increased RV thresholds noted with associated sensing issues. He was seen by Dr Maurice in clinic and they discussed RV lead replacement. Venogram with patent left subclavian vein, new RV lead was implanted by Dr Maurice on 1/13/23. Old RV lead was capped.    - Device check today with normal device function and stable lead trends, AP <0.1%,  0.3%, One 4 hour AT/AFL episode     - Continue routine device follow up   6. Fluid status: euvolemic on exam     Follow up with Dr Maurice in 6 months, device check prior.      History of Present Illness/Subjective    Mr. Dandy Sands is a 62 year old male who comes in today for EP follow-up after RV lead revision.    Mr. Sands is a 62 year old male who has a past medical history significant for CAD s/p PCI, ICM LVEF 10-15%, s/p dual chamber CRTD 2006, s/p HM3 LVAD 7/28/22, drive line infection, SLE, antiphospholipid syndrome, HTN, HLD, and PE.     He has a longstanding history of CAD. He had PCI to LAD first in 2005. He had a STEMI in 2007. He has had subsequent ICM. He eventually had CRTD implanted in 2006 with gen change in 2012. He was admitted after having COVID-19 for respiratory  distress. It was unclear whether this initially was related to pneumonia or decompensated heart failure. After a prolonged hospitalization and extensive testing it was felt that this was related to decompensated heart failure with progression of his MR. Due to persistent hypotension he was taken off most of his GDMT. He was seen HF who felt that the MR was the primary  of his decompensation.  He was eventually discharged with referral for mitraclip eval. He had coronary angiogram on 5/9/22 showing severe ostial LAD disease with in-stent restenosis of the mid LAD involving a diagonal bifurcation, mild LCx disease, and severe proximal RCA disease. LVEDP was 25.  TAVARES with anesthesia showed LVEF 30-35%, severely dilated LA, moderate to severe functional MR with multiple jets and apically tethered leaflets (he was hypotensive during the procedure SBP 70s). Due to his age, low LVEF, and multivessel CAD he was admitted that day for surgical evaluation.  CTS at [facility] felt he would benefit from CABG + MVR but that he should be at a center with ECMO/VAD back up.  He was readmitted on 5/27/22 with nausea, vomiting, and hypotension. A right heart catheterization which showed minimally elevated filling pressures and borderline cardiac index however no clear evidence of cardiogenic shock.  During the admission medications were adjusted, from Brilinta we will switch back to Plavix and ultimately his nausea resolved and was able to tolerate food before discharge.  He continued to have worsening HF issues and ultimately underwent LVAD in 7/28/22. His ICD lead was found to have low R wave amplitude post LVAD. His LVAD surgery was complicated by severe right ventricular failure requiring temporary RVAD.  He also had severe delirium and at one point tearing out his bridled nasogastric tube, causing a nasal septal perforation and severe nosebleeding.  He was discharged to rehab and then we presented with severe C. difficile  diarrhea, dehydration, low flow alarms, as well as COVID-19 infection. He saw Dr. Maurice in clinic who discussed management options with him. He elected to pursue RV lead revision/addition. He underwent implantation of a new RV lead on 1/13/23 with Dr Muarice.     He presents today for follow up after implantation of new RV lead. He reports feeling well. Incision site well healed. He denies any palpitations, abdominal fullness or peripheral edema, shortness of breath, paroxysmal nocturnal dyspnea or orthopnea. Device interrogation shows normal device function, stable lead trends, 4 hours AFL episode. Current cardiac medications include: Warfarin, ASA, lisinopril, hydralazine, digoxin and atorvastatin.     I have reviewed and updated the patient's Past Medical History, Social History, Family History and Medication List.     Cardiographics (Personally Reviewed) :   Echo: 6/14/23    Left Ventricle: The left ventricle appears normal in size. Wall   thickness is normal. Severely reduced left ventricular systolic function.   The EF is visually estimated to be 15-20%. HeartMate III LVAD cannula   visualized. The aortic valve opens intermittently during the visualized   cardiac cycles. The LVAD inflow cannula is well seated at the apex.     Right Ventricle: The right ventricle is within the upper limits of   normal in size. Right ventricle was not well visualized. Systolic function   is mildly reduced.     IVC/SVC: Normal IVC size with minimal respirophasic changes.     Aortic Valve: There is mild regurgitation.     RHC: 9/23/22  Hemodynamic data has been modified in Epic per physician review.  Reduced cardiac output level.  Normal PA pressures.    Coronary Angiogram/RHC: 5/29/22  Right sided filling pressures are normal.  Mild elevated pulmonary hypertension.  Left sided filling pressures are mildly elevated.  Reduced cardiac output level.  Two vessel coronary artery disease including prior PCI of the RCA and LAD. There are  no new hemodynamically significant lesions.       Physical Examination   BP (!) 71/0 (BP Location: Right arm, Patient Position: Chair, Cuff Size: Adult Regular)   Pulse 71   Wt 72.6 kg (160 lb)   SpO2 100%   BMI 25.06 kg/m    Wt Readings from Last 3 Encounters:   08/24/23 72.6 kg (160 lb)   08/24/23 72.6 kg (160 lb)   08/24/23 67.1 kg (148 lb)     General Appearance:   Alert, well-appearing and in no acute distress.   HEENT: Atraumatic, normocephalic. MMM.   Chest/Lungs:   Respirations unlabored.  Lungs are clear to auscultation.   Cardiovascular:   LVAD hum.     Abdomen:  Soft, nontender, nondistended.   Extremities: No cyanosis or clubbing. No edema.     Musculoskeletal: Moves all extremities.     Skin: Warm, dry, intact. Incision site well healed.    Neurologic: Mood and affect are appropriate.  Alert and oriented to person, place, time, and situation.          Medications  Allergies   Current Outpatient Medications   Medication    acetaminophen (TYLENOL) 325 MG tablet    amoxicillin (AMOXIL) 500 MG capsule    aspirin (ASA) 81 MG EC tablet    atorvastatin (LIPITOR) 40 MG tablet    dalbavancin (DALVANCE) 20 mg/mL    digoxin (LANOXIN) 125 MCG tablet    ferrous sulfate (FEROSUL) 325 (65 Fe) MG tablet    gabapentin (NEURONTIN) 100 MG capsule    hydroxychloroquine (PLAQUENIL) 200 MG tablet    lidocaine (LIDODERM) 5 % patch    lisinopril (ZESTRIL) 10 MG tablet    loperamide (IMODIUM) 2 MG capsule    tamsulosin (FLOMAX) 0.4 MG capsule    warfarin ANTICOAGULANT (COUMADIN) 2 MG tablet     No current facility-administered medications for this visit.      No Known Allergies      Lab Results (Personally Reviewed)    Chemistry/lipid CBC Cardiac Enzymes/BNP/TSH/INR   Lab Results   Component Value Date    BUN 22.3 08/24/2023     08/24/2023    CO2 21 (L) 08/24/2023     Creatinine   Date Value Ref Range Status   08/24/2023 1.12 0.67 - 1.17 mg/dL Final       Lab Results   Component Value Date    CHOL 93 09/19/2022    HDL  43 09/19/2022    LDL 25 09/19/2022      Lab Results   Component Value Date    WBC 5.0 08/24/2023    HGB 8.6 (L) 08/24/2023    HCT 27.2 (L) 08/24/2023    MCV 88 08/24/2023     08/24/2023    Lab Results   Component Value Date    TSH 1.67 09/18/2022    INR 2.16 (H) 08/24/2023        The patient states understanding and is agreeable with the plan.     Whit Davidson PA-C  [facility]  Electrophysiology Consult Service  Pager: [phone]    I spent a total of 20 minutes face to face with Dandy Sands during today's office visit. I have spent an additional 30 minutes today on chart review and documentation.

## 2023-08-24 NOTE — PROGRESS NOTES
[facility]  Clinic Follow-Up Visit  8/24/2023     Chief Complaint:  LVAD infection    Assessment and Plan:  Dandy Sands is a 62 year old male with a history of SLE, antiphospholipid syndrome, CAD, CHF 2/2 ICM s/p LVAD 7/8/22, history of C.diff who presents today for management of chronic driveline infection. He is managed primarily by Joseph Reese and Dr. Steve Saravia with [facility]. He was in [place] today for cardiology appointment and so was asked to check in with ID here as well. 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 He recently finished a course of Bactrim for the Serratia and remains on dalbavancin for the C striatum.     His only good option for long term control of this polymicrobial infection is transplant, but he has very high levels of sensitization (thought to be related to his lupus) which is making transplant challenging for him. His 8/24/23 driveline swab is growing E coli sensitive to fluoroquinolones so we can see if his drainage decreases with addition of ciprofloxacin. If his C striatum breaks through dalbavancin, we will pursue phage therapy for this organism, consider omadacycline, and re-discuss transplant options with cardiology.     Plan:   Continue dalbavancin through [facility]  If C striatum grows again, would see if omadacycline sensitivities are available, consider phage therapy through [facility] (I'm happy to help coordinate), and reassess possibilities for expediting transplant vs device exchange.   8/24 isolate is growing E coli, quite resistant overall but sensitive to fluoroquinolones. His QTc is prolonged, but risk is low in setting of LVAD/ICD so will give trial of ciprofloxacin to see if his drainage improves.     Continue to follow-up with [facility]. If I can be of help  coordinating phage therapy, etc., please let me know.     Farida Charlton MD  Infectious Diseases  Pager [phone]     HPI:  Dandy Sands is a 62 year old male with a history of SLE, antiphospholipid syndrome, CAD, CHF 2/2 ICM s/p LVAD 7/8/22, history of C.diff who presents today for management of chronic driveline infection.  Patient has had a complex history since his initial LVAD placement in 7/2022.  He was hospitalized 6/17/22 - 8/21/22 for decompensated heart failure complicated by cardiogenic shock leading to LVAD placement.  He was in acute rehab from 8/21/22 - 9/4/22.  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 He was treated with vancomycin inpatient and ultimately transitioned to daptomycin for ease of dosing for 2-4 weeks.  He was subsequently placed on minocycline for suppression.  Since that time he has had ongoing episodes of C striatum breakthrough infection and is now on dalbavancin (previous agents tried include vancomycin, daptomycin, minocycline, linezolid). He has required debridement and has open draining small sternal wound and a secondary fistula near his driveline exit site (see picture below). He also recently had Serratia grow and finished a course of Bactrim earlier this month. Please also see Clifton HARDIN notes for excellent timeline/recap of previous infections and interventions.     He is currently doing well overall on long-term dalbavancin but has had some increased drainage again recently. Per patient, heart transplant workup has been stalled due to issues with sensitization.     LVAD History:  Current LVAD model: Heartmate III    Date current LVAD placed: 7/8/22    Previous LVAD devices: unknown    Other prosthetic devices/materials:  ICD     Primary cardiologist: Kelly Lora MD    Primary LVAD coordinator: Chantal Brasher, RN    Primary ID provider: [facility]if[facility] HARDIN, Joseph Reese and [facility] (Aishwarya Wolff, Phuong, and Zoltan)    History of bacteremias (dates and organisms):  -Actinomyces 11/6/22     History of driveline infections (dates and organisms):   -10/2022 Corynebacterium striatum  -2023 Serratia  -August 2023 E coli    History of other pertinent infections:   -Hx C.diff 2022    History of driveline area irritation and current mitigation strategies:  -Currently performing daily dressing changes    Current suppressive antibiotics:   -Dalbavancin    Previous antibiotic failures/allergies/intolerances etc:  -For C striatum has been on vancomycin, daptomycin, minocycline, linezolid    Transplant Plan:    -On hold due to sensitization      Allergies:   No Known Allergies    Immunizations:  Immunization History   Administered Date(s) Administered    COVID-19 Bivalent 12+ (Pfizer) 01/20/2023    COVID-19 Monovalent 18+ (Moderna) 03/13/2021, 04/10/2021, 08/24/2021    FLU 6-35 months 10/25/2012    Influenza (IIV3) PF 12/30/2008, 09/22/2009, 10/17/2020    Influenza Vaccine >6 months (Alfuria,Fluzone) 10/20/2017, 11/17/2018, 10/18/2019, 10/17/2020, 12/17/2021, 11/14/2022    Influenza Vaccine, 6+MO IM (QUADRIVALENT W/PRESERVATIVES) 10/17/2020    Influenza,INJ,MDCK,PF,Quad >6mo(Flucelvax) 12/17/2021    Pneumo Conj 13-V (2010&after) 04/09/2018    TDAP (Adacel,Boostrix) 03/14/2016    Tdap (Adult) Unspecified Formulation 03/14/2016    Zoster recombinant adjuvanted (SHINGRIX) 08/16/2019, 10/18/2019       Exam:  Wt 67.1 kg (148 lb)   BMI 23.18 kg/m    Gen: Alert and in no distress.   Psych: Normal affect. Alert and oriented.   HEENT: EOMI. No icterus. Moist mucous membranes   Neck: No lymphadenopathy.   CV: Regular rate    Chest: Non-labored breathing, symmetric chest rise.   Abdomen: Soft, non-distended. See pictures below. Draining sternal wound cultured.   Extremities: Warm and well perfused.   Skin: No rashes or lesions noted.       Labs:  WBC Count   Date Value Ref Range Status    03/26/2023 7.9 4.0 - 11.0 10e3/uL Final       CRP Inflammation   Date Value Ref Range Status   06/19/2022 28.0 (H) 0.0 - 8.0 mg/L Final       Creatinine   Date Value Ref Range Status   03/26/2023 0.77 0.67 - 1.17 mg/dL Final   03/25/2023 0.80 0.67 - 1.17 mg/dL Final   03/24/2023 0.79 0.67 - 1.17 mg/dL Final

## 2023-08-24 NOTE — PATIENT INSTRUCTIONS
"The new therapy we could try is called bacteriophage \"phage\" therapy. You can read about it online or there is a book called \"The Perfect Predator\" if you'd like to read more.   I'll call you when we get the culture results.   "

## 2023-08-24 NOTE — NURSING NOTE
Chief Complaint   Patient presents with    RECHECK     lvad     Pulse 62   Wt 67.1 kg (148 lb)   SpO2 99%   BMI 23.18 kg/m    Joann Nuñez [facility]

## 2023-08-24 NOTE — LETTER
8/24/2023      RE: Dandy Sands  [address]  [facility] [address]       Dear Colleague,    Thank you for the opportunity to participate in the care of your patient, Dandy Sands, at the [facility] [place] at [facility]. Please see a copy of my visit note below.    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 Zamora). Patient has previously been treated with daptomycin (10/2022, 12/2022) and more recently has been on minocycline for suppression of Corynebacterium driveline infection. He presents today for LVAD follow-up.    No SOB at rest. No CASEY. No LE edema, abdominal edema, orthopnea or PND. He gets some lightheadedness when he stands up too quick. He is getting better at getting up slowly and feels that he can manage it okay this way. No palpitations. He has some left sided chest pain, especially when he lays on his left side, that has been true since the LVAD implant. Has not gotten any wore. He is not sure if the gabapentin is helping. He takes tylenol intermittently for that and that does help. No palpitations.     No blood in the urine or blood in the stool. No prolonged nose bleeds.    He still has drainged from he prior I&D site and also from the driveline. No pain there. No fevers or chills.      No headaches. No No stroke symptoms.      Cardiac Medications  Coumadin  ASA 81 mg daily  Lisinopril 10 mg BID  Hydralazine 25 mg TID  Digoxin 125 mcg qd  Atorvastatin 40 mg daily    PAST MEDICAL HISTORY:  Past Medical History:   Diagnosis Date    Antiphospholipid antibody syndrome (H)     CAD (coronary artery disease)     Chronic systolic heart failure (H)     Ischemic cardiomyopathy     Mitral  regurgitation     Systemic lupus erythematosus (H)        FAMILY HISTORY:  No family history on file.    SOCIAL HISTORY:  Social History     Socioeconomic History    Marital status: Single   Tobacco Use    Smoking status: Former    Smokeless tobacco: Never       CURRENT MEDICATIONS:  acetaminophen (TYLENOL) 325 MG tablet, Take 3 tablets (975 mg) by mouth every 8 hours as needed for mild pain (Patient taking differently: Take 975 mg by mouth every 8 hours as needed for mild pain prn)  amoxicillin (AMOXIL) 500 MG capsule, Take 4 capsules (2,000 mg) by mouth as needed (take 1 hour prior to any dental procedures)  aspirin (ASA) 81 MG EC tablet, Take 1 tablet (81 mg) by mouth daily  atorvastatin (LIPITOR) 40 MG tablet, Take 1 tablet (40 mg) by mouth daily  dalbavancin (DALVANCE) 20 mg/mL, Inject into the vein every 14 days  digoxin (LANOXIN) 125 MCG tablet, Take 1 tablet (125 mcg) by mouth daily  gabapentin (NEURONTIN) 100 MG capsule, TAKE 1 Capsule BY MOUTH EVERY MORNING, NOON & BEDTIME  hydrALAZINE (APRESOLINE) 25 MG tablet, Take 25 mg by mouth 3 times daily  hydroxychloroquine (PLAQUENIL) 200 MG tablet, Take 1 tablet (200 mg) by mouth 2 times daily  lidocaine (LIDODERM) 5 % patch, Place 1 patch onto the skin every 24 hours To prevent lidocaine toxicity, patient should be patch free for 12 hrs daily.  lisinopril (ZESTRIL) 10 MG tablet, Take 1 tablet (10 mg) by mouth 2 times daily  loperamide (IMODIUM) 2 MG capsule, Take 1 capsule (2 mg) by mouth 2 times daily as needed for diarrhea  tamsulosin (FLOMAX) 0.4 MG capsule, Take 1 capsule (0.4 mg) by mouth daily  warfarin ANTICOAGULANT (COUMADIN) 2 MG tablet, Take 4 mg PO daily at 6 pm until next INR check, then dose per INR goal 2-3.  ferrous sulfate (FEROSUL) 325 (65 Fe) MG tablet, Take 325 mg by mouth daily (with breakfast) (Patient not taking: Reported on 8/24/2023)  [DISCONTINUED] clopidogrel (PLAVIX) 75 MG tablet, Take 1 tablet (75 mg) by mouth daily  [DISCONTINUED]  DULoxetine (CYMBALTA) 30 MG capsule, Take 1 capsule (30 mg) by mouth daily  [DISCONTINUED] mycophenolate (GENERIC EQUIVALENT) 500 MG tablet, Take 3 tablets (1,500 mg) by mouth 2 times daily for 30 days  [DISCONTINUED] ticagrelor (BRILINTA) 90 MG tablet, Take 1 tablet (90 mg) by mouth 2 times daily    No current facility-administered medications on file prior to visit.      ROS:   See HPI     EXAM:  BP (!) 71/0 (BP Location: Right arm, Patient Position: Chair, Cuff Size: Adult Regular)   Pulse 71   Wt 72.6 kg (160 lb)   SpO2 100%   BMI 25.06 kg/m      GENERAL: Appears comfortable, in no distress. Thin, but he has recovered some of his muscle mass since I last saw him  HEENT: Eye symmetrical and without discharge or icterus bilaterally.   NECK: Supple, JVD <6.   CV: Hum of LVAD, no adventitious sounds  RESPIRATORY: Respirations regular, even, and unlabored. Lungs CTA throughout.   GI: Soft and non distended with normoactive bowel sounds   EXTREMITIES: No peripheral edema. All extremities are warm and well perfused  NEUROLOGIC: Alert and interacting appropriatlty.  No focal deficits.   MUSCULOSKELETAL: No joint swelling or tenderness.   SKIN: No jaundice. No rashes or lesions. Driveline dressing c/d/i.    Labs - as reviewed in clinic with patient today:  CBC RESULTS:  Lab Results   Component Value Date    WBC 5.0 08/24/2023    RBC 3.10 (L) 08/24/2023    HGB 8.6 (L) 08/24/2023    HCT 27.2 (L) 08/24/2023    MCV 88 08/24/2023    MCH 27.7 08/24/2023    MCHC 31.6 08/24/2023    RDW 18.0 (H) 08/24/2023     08/24/2023       CMP RESULTS:  Lab Results   Component Value Date     08/24/2023    POTASSIUM 3.6 08/24/2023    POTASSIUM 4.3 10/27/2022    POTASSIUM 3.9 09/01/2022    POTASSIUM 5.2 07/09/2022    CHLORIDE 106 08/24/2023    CHLORIDE 112 (H) 09/01/2022    CO2 21 (L) 08/24/2023    CO2 22 09/01/2022    ANIONGAP 12 08/24/2023    ANIONGAP 6 09/01/2022    GLC 98 08/24/2023    GLC 82 03/17/2023    GLC 94 09/01/2022     BUN 22.3 08/24/2023    BUN 13 09/01/2022    CR 1.12 08/24/2023    GFRESTIMATED 75 08/24/2023    ELDA 9.3 08/24/2023    BILITOTAL 0.4 08/24/2023    ALBUMIN 4.2 08/24/2023    ALBUMIN 3.1 (L) 09/01/2022    ALKPHOS 205 (H) 08/24/2023    ALT 22 08/24/2023    AST 22 08/24/2023        INR RESULTS:  Lab Results   Component Value Date    INR 2.16 (H) 08/24/2023    INR 1.9 (A) 08/16/2023       Lab Results   Component Value Date    MAG 1.7 11/02/2022     Lab Results   Component Value Date    NTBNPI 5,061 (H) 10/14/2022     No results found for: NTBNP    Assessment and Plan:   Dandy Sands is a 62 year old male with history of SLE, antiphospholipid syndrome, history of PE, C.diff infection (2022), CAD, HFrEF 2/2 ICM s/p HeartMateIII LVAD (7/8/22) also with ICD, 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 Zamora). HE presents today for LVAD follow-up. He is also seeing ID and EP today.    From the LVAD perspective he is doing well apart from his infection. He appears euvolemic. MAP is at goal. We did talk slightly about transplant today- his PRAs are an issue. Salud is happy on his LVAD and actually wondering himself if transplant is not the best option for him given the risks with antibodies and side effects of those medications. I think that these will be evolving discussions over the coming visits/years and will depend significantly on his PRAS and also on how we are able to control his driveline infection.     He will come back to see Dr. Lora in a few months- I think at that time we should discuss if we can transition to a more formal shared care plan- [facility] They are already seeing him intermittently.    # Chronic systolic heart failure secondary to ICM   # s/p HM3 LVAD on 7/8/12  c/b RV failure with temporary RVAD support with Protek duo (removed 7/21) and post chest closure hemopericardium  s/p repeat washout on 7/12, epistaixis requiring reintubation for airway protection.  Stage D. NYHA Class III- confounded by recent surgery     Fluid status euvolemic off diuretics, Rx for lasix 20 mg daily PRN  ACEi/ARB: increase lisinopril to 15 mg in them morning and 10 mg in the evening and STOP hydralazine.    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 Patient has previously been treated with daptomycin (10/2022, 12/2022) and more recently then later was on minocycline for suppression of Corynebacterium driveline infection. He does continue to have ongoin drainage and non-healing wounds at the driveline site and also at the prior I&D site.  - He follows with ID at [place] and intermittently with ID here  - Saw ID this morning    #CAD s/p PCI to LAD ('05)  - continue ASA and atorvastatin      # SLE  # antiphospholipid syndrome  - He is on CellCept  - Anticoagulation as above  - Consider his antiphospholipid syndrome when making bridging decisions    Elevated alk phos. Up to 205. Other LFTs are normal  - Recheck in 2 weeks    Chronic anemia, likely in the setting of chronic disease and infection  - Will add on iron studies, B12, and add folate in 2 weeks    Follow-up  - BMP in 2 weeks (trend alk phos, also increased lisinopril)  - Rerferred to sleep clinic  -  Dr. Lora in 4 months    Billing  - I managed 2+ stable chronic conditions  - I changed a prescription medication  - I reviewed 4+ labs        Ronn Oropeza VALMIKI R

## 2023-08-24 NOTE — PATIENT INSTRUCTIONS
Medications:  NO CHANGES in medications    Instructions:  Get alkaline phosphotase lab drawn in one month at the same time that you get your INR drawn.   Your VAD coordinator will follow up with you shortly after infectious disease gives any recommendations.  Follow up with the sleep clinic near you to assess for sleep apnea  Page the VAD coordinator on call with any changes in driveline integrity, or new/worsened signs of infection.    Follow-up: (make these appointments before you leave)  1. Please follow-up with Dr. Lora in 4 months with labs prior.        Page the VAD Coordinator on call if you gain more than 3 lb in a day or 5 in a week. Please also page if you feel unwell or have alarms.   Great to see you in clinic today. To Page the VAD Coordinator on call, dial [phone] option #4 and ask to speak to the VAD coordinator on call.

## 2023-08-24 NOTE — NURSING NOTE
Chief Complaint   Patient presents with    Follow Up     Return EP- 7 mo follow up post CRTD lead revision with TY on 1/13/2023     Vitals were taken, medications reconciled, and MAP was recorded.    KHARI Mckeon  10:16 AM

## 2023-08-24 NOTE — LETTER
8/24/2023       RE: Dandy Sands  [address]  [facility] [address]     Dear Colleague,    Thank you for referring your patient, Dandy Sands, to the [facility] [place] at [facility]. Please see a copy of my visit note below.    [facility]  Clinic Follow-Up Visit  8/24/2023     Chief Complaint:  LVAD infection    Assessment and Plan:  Dandy Sands is a 62 year old male with a history of SLE, antiphospholipid syndrome, CAD, CHF 2/2 ICM s/p LVAD 7/8/22, history of C.diff who presents today for management of chronic driveline infection. He is managed primarily by Joseph Reese and Dr. Steve Saravia with [facility]. He was in [place] today for cardiology appointment and so was asked to check in with ID here as well. 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 He recently finished a course of Bactrim for the Serratia and remains on dalbavancin for the C striatum.     His only good option for long term control of this polymicrobial infection is transplant, but he has very high levels of sensitization (thought to be related to his lupus) which is making transplant challenging for him. His 8/24/23 driveline swab is growing E coli sensitive to fluoroquinolones so we can see if his drainage decreases with addition of ciprofloxacin. If his C striatum breaks through dalbavancin, we will pursue phage therapy for this organism, consider omadacycline, and re-discuss transplant options with cardiology.     Plan:   Continue dalbavancin through [facility]  If C striatum grows again, would see if omadacycline sensitivities are available, consider phage therapy through [facility] (I'm happy to help coordinate), and reassess possibilities for expediting  transplant vs device exchange.   8/24 isolate is growing E coli, quite resistant overall but sensitive to fluoroquinolones. His QTc is prolonged, but risk is low in setting of LVAD/ICD so will give trial of ciprofloxacin to see if his drainage improves.     Continue to follow-up with [facility]. If I can be of help coordinating phage therapy, etc., please let me know.     Farida Charlton MD  Infectious Diseases  Pager [phone]     HPI:  Dandy Sands is a 62 year old male with a history of SLE, antiphospholipid syndrome, CAD, CHF 2/2 ICM s/p LVAD 7/8/22, history of C.diff who presents today for management of chronic driveline infection.  Patient has had a complex history since his initial LVAD placement in 7/2022.  He was hospitalized 6/17/22 - 8/21/22 for decompensated heart failure complicated by cardiogenic shock leading to LVAD placement.  He was in acute rehab from 8/21/22 - 9/4/22.  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 He was treated with vancomycin inpatient and ultimately transitioned to daptomycin for ease of dosing for 2-4 weeks.  He was subsequently placed on minocycline for suppression.  Since that time he has had ongoing episodes of C striatum breakthrough infection and is now on dalbavancin (previous agents tried include vancomycin, daptomycin, minocycline, linezolid). He has required debridement and has open draining small sternal wound and a secondary fistula near his driveline exit site (see picture below). He also recently had Serratia grow and finished a course of Bactrim earlier this month. Please also see [facility] notes for excellent timeline/recap of previous infections and interventions.     He is currently doing well overall on long-term dalbavancin but has had some increased drainage again recently. Per patient, heart transplant workup has been stalled due to issues with sensitization.     LVAD History:  Current  LVAD model: Heartmate III    Date current LVAD placed: 7/8/22    Previous LVAD devices: unknown    Other prosthetic devices/materials:  ICD     Primary cardiologist: Kelly Lora MD    Primary LVAD coordinator: Chantal Brasher RN    Primary ID provider: [facility], Joseph Reese and Dr. [facility] (Tony Latif, Aishwarya, Phuong, and Zoltan)    History of bacteremias (dates and organisms):  -Actinomyces 11/6/22     History of driveline infections (dates and organisms):   -10/2022 Corynebacterium striatum  -2023 Serratia  -August 2023 E coli    History of other pertinent infections:   -Hx C.diff 2022    History of driveline area irritation and current mitigation strategies:  -Currently performing daily dressing changes    Current suppressive antibiotics:   -Dalbavancin    Previous antibiotic failures/allergies/intolerances etc:  -For C striatum has been on vancomycin, daptomycin, minocycline, linezolid    Transplant Plan:    -On hold due to sensitization      Allergies:   No Known Allergies    Immunizations:  Immunization History   Administered Date(s) Administered    COVID-19 Bivalent 12+ (Pfizer) 01/20/2023    COVID-19 Monovalent 18+ (Moderna) 03/13/2021, 04/10/2021, 08/24/2021    FLU 6-35 months 10/25/2012    Influenza (IIV3) PF 12/30/2008, 09/22/2009, 10/17/2020    Influenza Vaccine >6 months (Alfuria,Fluzone) 10/20/2017, 11/17/2018, 10/18/2019, 10/17/2020, 12/17/2021, 11/14/2022    Influenza Vaccine, 6+MO IM (QUADRIVALENT W/PRESERVATIVES) 10/17/2020    Influenza,INJ,MDCK,PF,Quad >6mo(Flucelvax) 12/17/2021    Pneumo Conj 13-V (2010&after) 04/09/2018    TDAP (Adacel,Boostrix) 03/14/2016    Tdap (Adult) Unspecified Formulation 03/14/2016    Zoster recombinant adjuvanted (SHINGRIX) 08/16/2019, 10/18/2019       Exam:  Wt 67.1 kg (148 lb)   BMI 23.18 kg/m    Gen: Alert and in no distress.   Psych: Normal affect. Alert and oriented.   HEENT: EOMI. No icterus. Moist mucous membranes    Neck: No lymphadenopathy.   CV: Regular rate    Chest: Non-labored breathing, symmetric chest rise.   Abdomen: Soft, non-distended. See pictures below. Draining sternal wound cultured.   Extremities: Warm and well perfused.   Skin: No rashes or lesions noted.       Labs:  WBC Count   Date Value Ref Range Status   03/26/2023 7.9 4.0 - 11.0 10e3/uL Final       CRP Inflammation   Date Value Ref Range Status   06/19/2022 28.0 (H) 0.0 - 8.0 mg/L Final       Creatinine   Date Value Ref Range Status   03/26/2023 0.77 0.67 - 1.17 mg/dL Final   03/25/2023 0.80 0.67 - 1.17 mg/dL Final   03/24/2023 0.79 0.67 - 1.17 mg/dL Final       Farida Charlton MD

## 2023-08-24 NOTE — LETTER
Mechanical Circulatory Support Program  [address], [facility] [address] [place] [address]  [phone] Office Phone  [phone] Fax Number  Faxed to:  [facility]  Fax Number:  [phone]                                                                       STANDING INR      Patient Name: Dandy EDUARD CHUNG 1960   Diagnosis/ICD-9: Heart Failure, unspecified I50.9; LVAD Z95.811   Requesting Physician: Dr. Kelly Lora   Date of Request: 2023     Date ORDERS   23 (approx.) Alkaline Phosphotase         Signed,      Kelly Lora MD  Heart Failure, Mechanical Circulatory Support and Transplant Cardiology   of Medicine,  Division of Cardiology, [facility]

## 2023-08-24 NOTE — NURSING NOTE
Chief Complaint   Patient presents with    Follow Up     Return VAD- July follow up     Vitals were taken, medications reconciled, and MAP was recorded.    KHARI Mckeon  10:17 AM

## 2023-08-24 NOTE — PROGRESS NOTES
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 Zamora). Patient has previously been treated with daptomycin (10/2022, 12/2022) and more recently has been on minocycline for suppression of Corynebacterium driveline infection. He presents today for LVAD follow-up.    No SOB at rest. No CASEY. No LE edema, abdominal edema, orthopnea or PND. He gets some lightheadedness when he stands up too quick. He is getting better at getting up slowly and feels that he can manage it okay this way. No palpitations. He has some left sided chest pain, especially when he lays on his left side, that has been true since the LVAD implant. Has not gotten any wore. He is not sure if the gabapentin is helping. He takes tylenol intermittently for that and that does help. No palpitations.     No blood in the urine or blood in the stool. No prolonged nose bleeds.    He still has drainged from he prior I&D site and also from the driveline. No pain there. No fevers or chills.      No headaches. No No stroke symptoms.      Cardiac Medications  Coumadin  ASA 81 mg daily  Lisinopril 10 mg BID  Hydralazine 25 mg TID  Digoxin 125 mcg qd  Atorvastatin 40 mg daily    PAST MEDICAL HISTORY:  Past Medical History:   Diagnosis Date    Antiphospholipid antibody syndrome (H)     CAD (coronary artery disease)     Chronic systolic heart failure (H)     Ischemic cardiomyopathy     Mitral regurgitation     Systemic lupus erythematosus (H)        FAMILY HISTORY:  No family history on file.    SOCIAL HISTORY:  Social History     Socioeconomic History    Marital status: Single   Tobacco Use    Smoking status: Former    Smokeless tobacco: Never       CURRENT MEDICATIONS:  acetaminophen (TYLENOL) 325 MG tablet, Take 3 tablets (975 mg)  by mouth every 8 hours as needed for mild pain (Patient taking differently: Take 975 mg by mouth every 8 hours as needed for mild pain prn)  amoxicillin (AMOXIL) 500 MG capsule, Take 4 capsules (2,000 mg) by mouth as needed (take 1 hour prior to any dental procedures)  aspirin (ASA) 81 MG EC tablet, Take 1 tablet (81 mg) by mouth daily  atorvastatin (LIPITOR) 40 MG tablet, Take 1 tablet (40 mg) by mouth daily  dalbavancin (DALVANCE) 20 mg/mL, Inject into the vein every 14 days  digoxin (LANOXIN) 125 MCG tablet, Take 1 tablet (125 mcg) by mouth daily  gabapentin (NEURONTIN) 100 MG capsule, TAKE 1 Capsule BY MOUTH EVERY MORNING, NOON & BEDTIME  hydrALAZINE (APRESOLINE) 25 MG tablet, Take 25 mg by mouth 3 times daily  hydroxychloroquine (PLAQUENIL) 200 MG tablet, Take 1 tablet (200 mg) by mouth 2 times daily  lidocaine (LIDODERM) 5 % patch, Place 1 patch onto the skin every 24 hours To prevent lidocaine toxicity, patient should be patch free for 12 hrs daily.  lisinopril (ZESTRIL) 10 MG tablet, Take 1 tablet (10 mg) by mouth 2 times daily  loperamide (IMODIUM) 2 MG capsule, Take 1 capsule (2 mg) by mouth 2 times daily as needed for diarrhea  tamsulosin (FLOMAX) 0.4 MG capsule, Take 1 capsule (0.4 mg) by mouth daily  warfarin ANTICOAGULANT (COUMADIN) 2 MG tablet, Take 4 mg PO daily at 6 pm until next INR check, then dose per INR goal 2-3.  ferrous sulfate (FEROSUL) 325 (65 Fe) MG tablet, Take 325 mg by mouth daily (with breakfast) (Patient not taking: Reported on 8/24/2023)  [DISCONTINUED] clopidogrel (PLAVIX) 75 MG tablet, Take 1 tablet (75 mg) by mouth daily  [DISCONTINUED] DULoxetine (CYMBALTA) 30 MG capsule, Take 1 capsule (30 mg) by mouth daily  [DISCONTINUED] mycophenolate (GENERIC EQUIVALENT) 500 MG tablet, Take 3 tablets (1,500 mg) by mouth 2 times daily for 30 days  [DISCONTINUED] ticagrelor (BRILINTA) 90 MG tablet, Take 1 tablet (90 mg) by mouth 2 times daily    No current facility-administered medications on  file prior to visit.      ROS:   See HPI     EXAM:  BP (!) 71/0 (BP Location: Right arm, Patient Position: Chair, Cuff Size: Adult Regular)   Pulse 71   Wt 72.6 kg (160 lb)   SpO2 100%   BMI 25.06 kg/m      GENERAL: Appears comfortable, in no distress. Thin, but he has recovered some of his muscle mass since I last saw him  HEENT: Eye symmetrical and without discharge or icterus bilaterally.   NECK: Supple, JVD <6.   CV: Hum of LVAD, no adventitious sounds  RESPIRATORY: Respirations regular, even, and unlabored. Lungs CTA throughout.   GI: Soft and non distended with normoactive bowel sounds   EXTREMITIES: No peripheral edema. All extremities are warm and well perfused  NEUROLOGIC: Alert and interacting appropriatlty.  No focal deficits.   MUSCULOSKELETAL: No joint swelling or tenderness.   SKIN: No jaundice. No rashes or lesions. Driveline dressing c/d/i.    Labs - as reviewed in clinic with patient today:  CBC RESULTS:  Lab Results   Component Value Date    WBC 5.0 08/24/2023    RBC 3.10 (L) 08/24/2023    HGB 8.6 (L) 08/24/2023    HCT 27.2 (L) 08/24/2023    MCV 88 08/24/2023    MCH 27.7 08/24/2023    MCHC 31.6 08/24/2023    RDW 18.0 (H) 08/24/2023     08/24/2023       CMP RESULTS:  Lab Results   Component Value Date     08/24/2023    POTASSIUM 3.6 08/24/2023    POTASSIUM 4.3 10/27/2022    POTASSIUM 3.9 09/01/2022    POTASSIUM 5.2 07/09/2022    CHLORIDE 106 08/24/2023    CHLORIDE 112 (H) 09/01/2022    CO2 21 (L) 08/24/2023    CO2 22 09/01/2022    ANIONGAP 12 08/24/2023    ANIONGAP 6 09/01/2022    GLC 98 08/24/2023    GLC 82 03/17/2023    GLC 94 09/01/2022    BUN 22.3 08/24/2023    BUN 13 09/01/2022    CR 1.12 08/24/2023    GFRESTIMATED 75 08/24/2023    ELDA 9.3 08/24/2023    BILITOTAL 0.4 08/24/2023    ALBUMIN 4.2 08/24/2023    ALBUMIN 3.1 (L) 09/01/2022    ALKPHOS 205 (H) 08/24/2023    ALT 22 08/24/2023    AST 22 08/24/2023        INR RESULTS:  Lab Results   Component Value Date    INR 2.16 (H)  08/24/2023    INR 1.9 (A) 08/16/2023       Lab Results   Component Value Date    MAG 1.7 11/02/2022     Lab Results   Component Value Date    NTBNPI 5,061 (H) 10/14/2022     No results found for: NTBNP    Assessment and Plan:   Dandy Sands is a 62 year old male with history of SLE, antiphospholipid syndrome, history of PE, C.diff infection (2022), CAD, HFrEF 2/2 ICM s/p HeartMateIII LVAD (7/8/22) also with ICD, 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 Zamora). HE presents today for LVAD follow-up. He is also seeing ID and EP today.    From the LVAD perspective he is doing well apart from his infection. He appears euvolemic. MAP is at goal. We did talk slightly about transplant today- his PRAs are an issue. Hhe is happy on his LVAD and actually wondering himself if transplant is not the best option for him given the risks with antibodies and side effects of those medications. I think that these will be evolving discussions over the coming visits/years and will depend significantly on his PRAS and also on how we are able to control his driveline infection.     He will come back to see Dr. Lora in a few months- I think at that time we should discuss if we can transition to a more formal shared care plan- alternating his q3 month visits with the [facility] team. They are already seeing him intermittently.    # Chronic systolic heart failure secondary to ICM   # s/p HM3 LVAD on 7/8/12  c/b RV failure with temporary RVAD support with Protek duo (removed 7/21) and post chest closure hemopericardium s/p repeat washout on 7/12, epistaixis requiring reintubation for airway protection.  Stage D. NYHA Class III- confounded by recent surgery     Fluid status euvolemic off diuretics, Rx for lasix 20 mg daily PRN  ACEi/ARB: increase lisinopril to 15 mg in them morning and 10 mg in the evening and STOP hydralazine.    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 Patient has previously been treated with daptomycin (10/2022, 12/2022) and more recently then later was on minocycline for suppression of Corynebacterium driveline infection. He does continue to have ongoin drainage and non-healing wounds at the driveline site and also at the prior I&D site.  - He follows with ID at [facility] and intermittently with ID here  - Saw ID this morning    #CAD s/p PCI to LAD ('05)  - continue ASA and atorvastatin      # SLE  # antiphospholipid syndrome  - He is on CellCept  - Anticoagulation as above  - Consider his antiphospholipid syndrome when making bridging decisions    Elevated alk phos. Up to 205. Other LFTs are normal  - Recheck in 2 weeks    Chronic anemia, likely in the setting of chronic disease and infection  - Will add on iron studies, B12, and add folate in 2 weeks    Follow-up  - BMP in 2 weeks (trend alk phos, also increased lisinopril)  - Rerferred to sleep clinic  - Dr. Lora in 4 months    Billing  - I managed 2+ stable chronic conditions  - I changed a prescription medication  - I reviewed 4+ labs        Ronn Oropeza VALMIKI R

## 2023-08-24 NOTE — PROGRESS NOTES
ANTICOAGULATION MANAGEMENT     Dandy CHAPA Reginoharitha 62 year old male is on warfarin with therapeutic INR result. (Goal INR 2.0-3.0)    Recent labs: (last 7 days)     08/24/23  0943   INR 2.16*       ASSESSMENT     Source(s): Chart Review and Patient/Caregiver Call     Warfarin doses taken: Warfarin taken as instructed  Diet: No new diet changes identified  Medication/supplement changes: None noted  New illness, injury, or hospitalization: No  Signs or symptoms of bleeding or clotting: No  Previous result: Subtherapeutic  Additional findings: None       PLAN     Recommended plan for no diet, medication or health factor changes affecting INR     Dosing Instructions: Continue your current warfarin dose with next INR in 1 week       Summary  As of 8/24/2023      Full warfarin instructions:  7.5 mg every Mon, Thu; 6.25 mg all other days   Next INR check:  8/31/2023               Telephone call with Dandy who verbalizes understanding and agrees to plan    Patient using outside facility for labs    Education provided:   Goal range and lab monitoring: goal range and significance of current result    Plan made per [facility] anticoagulation protocol and per LVAD protocol    Kiki Dave RN  Anticoagulation Clinic  8/24/2023    _______________________________________________________________________     Anticoagulation Episode Summary       Current INR goal:  2.0-3.0   TTR:  49.8 % (9.9 mo)   Target end date:  Indefinite   Send INR reminders to:  ANTICOAG LVAD    Indications    Chronic systolic congestive heart failure (H) [I50.22]  LVAD (left ventricular assist device) present (H) [Z95.811]  Heart failure with reduced ejection fraction  NYHA class III (H) [I50.20]  Left ventricular assist device present (H) [Z95.811]             Comments:  Follow VAD Anticoag protocol:Yes: HeartMate 3   Bridging: No bridging epistaxis.   Date VAD placed: 7/8/22             Anticoagulation Care Providers       Provider Role Specialty Phone number     Kelly Lora MD Referring Cardiovascular Disease [phone]

## 2023-08-24 NOTE — NURSING NOTE
MCS VAD Pump Info       Row Name 08/24/23 0930             MCS VAD Information    Implant LVAD      LVAD Pump HeartMate 3         Heartmate 3 LEFT VS    Flow (Lpm) 4.4 Lpm      Pulse Index (PI) 3.7 PI      Speed (rpm) 5100 rpm      Power (persaud) 5.3 persaud      Current Hct setting 28      Retired: Unexpected Alarms --         Primary Controller    Serial number [id number]      Low flow alarm setting 2.0      High watt alarm setting --      EBB: Patient use 5      Replace in 23 Months         Backup Controller    Serial number [id number]      EBB: Patient use 8      Replace EBB in 23 Months      Speed & HCT match primary controller --  NA         VAD Interrogation    Alarms reported by patient N      Unexpected alarms noted upon interrogation None      PI events Frequent  Hx back 6 hours, PI range 2.9-9.1, 0 speed drops      Damage to equipment is noted N      Action taken Reviewed proper equipment care and maintenance         Driveline Exit Site    Dressing change done Y  Completed during ID, charged for dressing change in ID encounter      Driveline properly secured Yes      DLES assessment c/d/i;redness  Baseline redness, no concern for infectious etiology of redness per Dr. Charlton, appears to be skin irritation      Dressing used Daily kit  iodine swabs      Frequency patient changes dressing Daily      Dressing modifications --      Dressing change supplier --

## 2023-08-24 NOTE — PATIENT INSTRUCTIONS
You were seen in the Electrophysiology Clinic today by: Whit FABIAN    Plan:       Follow up Visit: 6 months with Dr Maurice and device check prior          If you have further questions, please utilize [employer] to contact us.     Your Care Team:    EP Cardiology   Telephone Number     Nurse Line  Dayana Reynaga, RN   Kym Tan, RN  Jim Saravia, LITA   [phone]     For scheduling appointments:   Shanti   [phone]   For procedure scheduling:    [facility]     [phone]   For the Device Clinic (Pacemakers, ICDs, Loop Recorders)    During business hours: [phone]  After business hours:   [phone]- select option 4 and ask for job code [id number].       On-call cardiologist for after hours or on weekends:   [phone], option #4, and ask to speak to the on-call cardiologist.     Cardiovascular Clinic:   [address] [place] [address]. [place] [address]      As always, Thank you for trusting us with your health care needs!

## 2023-08-25 ENCOUNTER — CARE COORDINATION (OUTPATIENT)
Dept: CARDIOLOGY | Facility: CLINIC | Age: 63
End: 2023-08-25

## 2023-08-25 DIAGNOSIS — Z95.811 LVAD (LEFT VENTRICULAR ASSIST DEVICE) PRESENT (H): ICD-10-CM

## 2023-08-25 DIAGNOSIS — D50.8 OTHER IRON DEFICIENCY ANEMIA: Primary | ICD-10-CM

## 2023-08-25 DIAGNOSIS — I50.22 CHRONIC SYSTOLIC CONGESTIVE HEART FAILURE (H): ICD-10-CM

## 2023-08-25 DIAGNOSIS — Z95.811 LEFT VENTRICULAR ASSIST DEVICE PRESENT (H): ICD-10-CM

## 2023-08-25 LAB
FERRITIN SERPL-MCNC: 403 NG/ML (ref 31–409)
IRON BINDING CAPACITY (ROCHE): 222 UG/DL (ref 240–430)
IRON SATN MFR SERPL: 15 % (ref 15–46)
IRON SERPL-MCNC: 33 UG/DL (ref 61–157)

## 2023-08-25 RX ORDER — FERROUS SULFATE 325(65) MG
325 TABLET ORAL EVERY OTHER DAY
Qty: 15 TABLET | Refills: 3 | Status: SHIPPED | OUTPATIENT
Start: 2023-08-25

## 2023-08-25 RX ORDER — LISINOPRIL 10 MG/1
TABLET ORAL
Qty: 45 TABLET | Refills: 3 | Status: SHIPPED | OUTPATIENT
Start: 2023-08-25 | End: 2023-08-27

## 2023-08-25 NOTE — LETTER
Mechanical Circulatory Support Program  [place] [address], [facility] [address] [place] [address]  [phone] Office Phone  [phone] Fax Number  Faxed to:  [facility]  Fax Number:  [phone]                                                                   ORDERS      Patient Name: Dandy Lacyharitha CHUNG 1960   Diagnosis/ICD-9: Heart Failure, unspecified I50.9; LVAD Z95.811   Requesting Physician: Dr. Kelly Lora   Date of Request: 2023     Please fax results to [phone]    Please report all critical lab results to the VAD coordinator on call by calling the hospital  at [phone] (option 4) and ask to page the VAD coordinator on call.       Date ORDERS   23 Alkaline Phosphotase   23 Complete Metabolic Panel   23 Folate Level   23 Vitamin B12 Level         Signed,      Kelly Lora MD  Heart Failure, Mechanical Circulatory Support and Transplant Cardiology   of Medicine,  Division of Cardiology, [facility]

## 2023-08-25 NOTE — PROGRESS NOTES
Spoke with patient and informed him of the follow recommendations from Laila España:     START taking Ferrous Sulfate by mouth 325mg every other day.   STOP taking Hydralazine  INCREASE Lisinopril to 15mg in the AM and 10mg in the evening.  Will check a CMP, folate, B12, and Alk Phos level in two weeks instead of one month    Advised pt that iron supplements by mouth may cause stools to be darker in color and could cause nausea/constipation. Recommended that pt diligently report any significantly darker colored and/or tarry stools to the VAD coordinator on call, as this could be a sign of gastrointestinal bleeding.     Also instructed pt to page the VAD coordinator with any signs or symptoms of high blood pressure, including abnormal VAD parameters or alarms, ringing in the ears, or headache. Also reviewed s/s of stroke and when to call EMS.    Pt requesting further rational for discontinuation of Hydralazine, as pt's PCP has informed him that he should not stop this medication. Will relay these concerns to Laila España and follow up on pt status in one week. Pt verbalized understanding of changes and able to perform accurate read back of instructions.

## 2023-08-27 DIAGNOSIS — Z95.811 LVAD (LEFT VENTRICULAR ASSIST DEVICE) PRESENT (H): ICD-10-CM

## 2023-08-27 DIAGNOSIS — D50.8 OTHER IRON DEFICIENCY ANEMIA: ICD-10-CM

## 2023-08-27 DIAGNOSIS — I50.22 CHRONIC SYSTOLIC CONGESTIVE HEART FAILURE (H): ICD-10-CM

## 2023-08-27 DIAGNOSIS — Z95.811 LEFT VENTRICULAR ASSIST DEVICE PRESENT (H): ICD-10-CM

## 2023-08-27 RX ORDER — LISINOPRIL 10 MG/1
TABLET ORAL
Qty: 225 TABLET | Refills: 3 | Status: SHIPPED | OUTPATIENT
Start: 2023-08-27 | End: 2023-08-27

## 2023-08-27 RX ORDER — LISINOPRIL 10 MG/1
TABLET ORAL
Qty: 225 TABLET | Refills: 3 | Status: SHIPPED | OUTPATIENT
Start: 2023-08-27 | End: 2023-12-14

## 2023-08-28 ENCOUNTER — CARE COORDINATION (OUTPATIENT)
Dept: CARDIOLOGY | Facility: CLINIC | Age: 63
End: 2023-08-28

## 2023-08-28 ENCOUNTER — ANCILLARY PROCEDURE (OUTPATIENT)
Dept: CARDIOLOGY | Facility: CLINIC | Age: 63
End: 2023-08-28
Attending: INTERNAL MEDICINE
Payer: COMMERCIAL

## 2023-08-28 DIAGNOSIS — Z95.811 LVAD (LEFT VENTRICULAR ASSIST DEVICE) PRESENT (H): ICD-10-CM

## 2023-08-28 DIAGNOSIS — T82.110D ICD (IMPLANTABLE CARDIOVERTER-DEFIBRILLATOR) LEAD FAILURE, SUBSEQUENT ENCOUNTER: ICD-10-CM

## 2023-08-28 DIAGNOSIS — I50.22 CHRONIC SYSTOLIC CONGESTIVE HEART FAILURE (H): Primary | ICD-10-CM

## 2023-08-28 DIAGNOSIS — I50.22 CHRONIC SYSTOLIC CONGESTIVE HEART FAILURE (H): ICD-10-CM

## 2023-08-28 DIAGNOSIS — Z95.811 LEFT VENTRICULAR ASSIST DEVICE PRESENT (H): ICD-10-CM

## 2023-08-28 LAB
MDC_IDC_LEAD_IMPLANT_DT: NORMAL
MDC_IDC_LEAD_LOCATION: NORMAL
MDC_IDC_LEAD_LOCATION_DETAIL_1: NORMAL
MDC_IDC_LEAD_MFG: NORMAL
MDC_IDC_LEAD_MODEL: NORMAL
MDC_IDC_LEAD_POLARITY_TYPE: NORMAL
MDC_IDC_LEAD_SERIAL: NORMAL
MDC_IDC_LEAD_SPECIAL_FUNCTION: NORMAL
MDC_IDC_MSMT_BATTERY_REMAINING_LONGEVITY: 43 MO
MDC_IDC_MSMT_BATTERY_VOLTAGE: 2.97 V
MDC_IDC_MSMT_CAP_CHARGE_TIME: 4.1 S
MDC_IDC_MSMT_CAP_CHARGE_TYPE: NORMAL
MDC_IDC_MSMT_LEADCHNL_RA_IMPEDANCE_VALUE: 342 OHM
MDC_IDC_MSMT_LEADCHNL_RA_PACING_THRESHOLD_AMPLITUDE: 0.5 V
MDC_IDC_MSMT_LEADCHNL_RA_PACING_THRESHOLD_PULSEWIDTH: 0.4 MS
MDC_IDC_MSMT_LEADCHNL_RA_SENSING_INTR_AMPL: 1.1 MV
MDC_IDC_MSMT_LEADCHNL_RV_IMPEDANCE_VALUE: 418 OHM
MDC_IDC_MSMT_LEADCHNL_RV_PACING_THRESHOLD_AMPLITUDE: 0.5 V
MDC_IDC_MSMT_LEADCHNL_RV_PACING_THRESHOLD_PULSEWIDTH: 0.4 MS
MDC_IDC_MSMT_LEADCHNL_RV_SENSING_INTR_AMPL: 5.3 MV
MDC_IDC_PG_IMPLANT_DTM: NORMAL
MDC_IDC_PG_MFG: NORMAL
MDC_IDC_PG_MODEL: NORMAL
MDC_IDC_PG_SERIAL: NORMAL
MDC_IDC_PG_TYPE: NORMAL
MDC_IDC_SESS_CLINIC_NAME: NORMAL
MDC_IDC_SESS_DTM: NORMAL
MDC_IDC_SESS_TYPE: NORMAL
MDC_IDC_SET_BRADY_AT_MODE_SWITCH_RATE: 171 {BEATS}/MIN
MDC_IDC_SET_BRADY_HYSTRATE: NORMAL
MDC_IDC_SET_BRADY_LOWRATE: 50 {BEATS}/MIN
MDC_IDC_SET_BRADY_MAX_SENSOR_RATE: 115 {BEATS}/MIN
MDC_IDC_SET_BRADY_MAX_TRACKING_RATE: 130 {BEATS}/MIN
MDC_IDC_SET_BRADY_MODE: NORMAL
MDC_IDC_SET_BRADY_PAV_DELAY_LOW: 350 MS
MDC_IDC_SET_BRADY_SAV_DELAY_LOW: 350 MS
MDC_IDC_SET_LEADCHNL_RA_PACING_AMPLITUDE: 1.5 V
MDC_IDC_SET_LEADCHNL_RA_PACING_ANODE_ELECTRODE_1: NORMAL
MDC_IDC_SET_LEADCHNL_RA_PACING_ANODE_LOCATION_1: NORMAL
MDC_IDC_SET_LEADCHNL_RA_PACING_CAPTURE_MODE: NORMAL
MDC_IDC_SET_LEADCHNL_RA_PACING_CATHODE_ELECTRODE_1: NORMAL
MDC_IDC_SET_LEADCHNL_RA_PACING_CATHODE_LOCATION_1: NORMAL
MDC_IDC_SET_LEADCHNL_RA_PACING_POLARITY: NORMAL
MDC_IDC_SET_LEADCHNL_RA_PACING_PULSEWIDTH: 0.4 MS
MDC_IDC_SET_LEADCHNL_RA_SENSING_ANODE_ELECTRODE_1: NORMAL
MDC_IDC_SET_LEADCHNL_RA_SENSING_ANODE_LOCATION_1: NORMAL
MDC_IDC_SET_LEADCHNL_RA_SENSING_CATHODE_ELECTRODE_1: NORMAL
MDC_IDC_SET_LEADCHNL_RA_SENSING_CATHODE_LOCATION_1: NORMAL
MDC_IDC_SET_LEADCHNL_RA_SENSING_POLARITY: NORMAL
MDC_IDC_SET_LEADCHNL_RA_SENSING_SENSITIVITY: 0.15 MV
MDC_IDC_SET_LEADCHNL_RV_PACING_AMPLITUDE: 2 V
MDC_IDC_SET_LEADCHNL_RV_PACING_ANODE_ELECTRODE_1: NORMAL
MDC_IDC_SET_LEADCHNL_RV_PACING_ANODE_LOCATION_1: NORMAL
MDC_IDC_SET_LEADCHNL_RV_PACING_CAPTURE_MODE: NORMAL
MDC_IDC_SET_LEADCHNL_RV_PACING_CATHODE_ELECTRODE_1: NORMAL
MDC_IDC_SET_LEADCHNL_RV_PACING_CATHODE_LOCATION_1: NORMAL
MDC_IDC_SET_LEADCHNL_RV_PACING_POLARITY: NORMAL
MDC_IDC_SET_LEADCHNL_RV_PACING_PULSEWIDTH: 0.4 MS
MDC_IDC_SET_LEADCHNL_RV_SENSING_ANODE_ELECTRODE_1: NORMAL
MDC_IDC_SET_LEADCHNL_RV_SENSING_ANODE_LOCATION_1: NORMAL
MDC_IDC_SET_LEADCHNL_RV_SENSING_CATHODE_ELECTRODE_1: NORMAL
MDC_IDC_SET_LEADCHNL_RV_SENSING_CATHODE_LOCATION_1: NORMAL
MDC_IDC_SET_LEADCHNL_RV_SENSING_POLARITY: NORMAL
MDC_IDC_SET_LEADCHNL_RV_SENSING_SENSITIVITY: 0.3 MV
MDC_IDC_SET_ZONE_DETECTION_BEATS_DENOMINATOR: 40 {BEATS}
MDC_IDC_SET_ZONE_DETECTION_BEATS_NUMERATOR: 30 {BEATS}
MDC_IDC_SET_ZONE_DETECTION_INTERVAL: 280 MS
MDC_IDC_SET_ZONE_DETECTION_INTERVAL: 320 MS
MDC_IDC_SET_ZONE_DETECTION_INTERVAL: 350 MS
MDC_IDC_SET_ZONE_DETECTION_INTERVAL: 350 MS
MDC_IDC_SET_ZONE_DETECTION_INTERVAL: 400 MS
MDC_IDC_SET_ZONE_TYPE: NORMAL
MDC_IDC_STAT_AT_BURDEN_PERCENT: 0.1 %
MDC_IDC_STAT_BRADY_RA_PERCENT_PACED: 0.1 %
MDC_IDC_STAT_BRADY_RV_PERCENT_PACED: 0.2 %
MDC_IDC_STAT_EPISODE_RECENT_COUNT: 0
MDC_IDC_STAT_EPISODE_RECENT_COUNT: 0
MDC_IDC_STAT_EPISODE_RECENT_COUNT: 5
MDC_IDC_STAT_EPISODE_TYPE: NORMAL

## 2023-08-28 PROCEDURE — 93295 DEV INTERROG REMOTE 1/2/MLT: CPT | Performed by: INTERNAL MEDICINE

## 2023-08-28 RX ORDER — LISINOPRIL 10 MG/1
TABLET ORAL
Qty: 60 TABLET | Refills: 3 | Status: ON HOLD | OUTPATIENT
Start: 2023-08-28 | End: 2024-06-13

## 2023-08-28 NOTE — PROGRESS NOTES
Checked in with patient after recent medication changes three days ago. Per pt, MAP is stable, and pt has not had any abnormal VAD parameters. Encouraged pt to page the VAD coordinator on call with any questions or concerns.

## 2023-08-29 LAB
BACTERIA ABSC ANAEROBE+AEROBE CULT: ABNORMAL
SA 1 CELL: NORMAL
SA 1 TEST METHOD: NORMAL
SA 2 CELL: NORMAL
SA 2 TEST METHOD: NORMAL
SA1 HI RISK ABY: NORMAL
SA1 MOD RISK ABY: NORMAL
SA2 HI RISK ABY: NORMAL
SA2 MOD RISK ABY: NORMAL
UNACCEPTABLE ANTIGENS: NORMAL
UNOS CPRA: 79
ZZZSA 1  COMMENTS: NORMAL
ZZZSA 2 COMMENTS: NORMAL

## 2023-08-31 LAB — BACTERIA ABSC ANAEROBE+AEROBE CULT: NORMAL

## 2023-09-05 ENCOUNTER — ANTICOAGULATION THERAPY VISIT (OUTPATIENT)
Dept: ANTICOAGULATION | Facility: CLINIC | Age: 63
End: 2023-09-05

## 2023-09-05 DIAGNOSIS — I50.20 HEART FAILURE WITH REDUCED EJECTION FRACTION, NYHA CLASS III (H): ICD-10-CM

## 2023-09-05 DIAGNOSIS — I50.22 CHRONIC SYSTOLIC CONGESTIVE HEART FAILURE (H): Primary | ICD-10-CM

## 2023-09-05 DIAGNOSIS — Z95.811 LVAD (LEFT VENTRICULAR ASSIST DEVICE) PRESENT (H): ICD-10-CM

## 2023-09-05 DIAGNOSIS — Z95.811 LEFT VENTRICULAR ASSIST DEVICE PRESENT (H): ICD-10-CM

## 2023-09-05 NOTE — PROGRESS NOTES
ANTICOAGULATION MANAGEMENT     Dandy Sands 62 year old male is on warfarin with therapeutic INR result. (Goal INR 2.0-3.0)    Recent labs: (last 7 days)     08/31/23  0947   INR 2.7*       ASSESSMENT     Source(s): Chart Review     Warfarin doses taken: Reviewed in chart  Diet: No new diet changes identified  Medication/supplement changes: None noted  New illness, injury, or hospitalization: No  Signs or symptoms of bleeding or clotting: No  Previous result: Therapeutic last visit; previously outside of goal range  Additional findings: None       PLAN     Recommended plan for no diet, medication or health factor changes affecting INR     Dosing Instructions: Continue your current warfarin dose with next INR in 1 week       Summary  As of 9/5/2023      Full warfarin instructions:  7.5 mg every Mon, Thu; 6.25 mg all other days   Next INR check:  9/7/2023               Detailed voice message left for Dandy with dosing instructions and follow up date.   Sent [employer] message with dosing and follow up instructions    Patient using outside facility for labs    Education provided:   Please call back if any changes to your diet, medications or how you've been taking warfarin  Goal range and lab monitoring: goal range and significance of current result and Importance of following up at instructed interval  Symptom monitoring: monitoring for bleeding signs and symptoms  Contact [phone] with any changes, questions or concerns.     Plan made per [facility] anticoagulation protocol and per LVAD protocol    LORENA MOSES RN  Anticoagulation Clinic  9/5/2023    _______________________________________________________________________     Anticoagulation Episode Summary       Current INR goal:  2.0-3.0   TTR:  50.7 % (10 mo)   Target end date:  Indefinite   Send INR reminders to:  ANTICOAG LVAD    Indications    Chronic systolic congestive heart failure (H) [I50.22]  LVAD (left ventricular assist device) present (H) [Z95.811]  Heart  failure with reduced ejection fraction  NYHA class III (H) [I50.20]  Left ventricular assist device present (H) [Z95.811]             Comments:  Follow VAD Anticoag protocol:Yes: HeartMate 3   Bridging: No bridging epistaxis.   Date VAD placed: 7/8/22             Anticoagulation Care Providers       Provider Role Specialty Phone number    Kelly Lora MD Referring Cardiovascular Disease [phone]

## 2023-09-07 ENCOUNTER — ANTICOAGULATION THERAPY VISIT (OUTPATIENT)
Dept: ANTICOAGULATION | Facility: CLINIC | Age: 63
End: 2023-09-07

## 2023-09-07 DIAGNOSIS — I50.20 HEART FAILURE WITH REDUCED EJECTION FRACTION, NYHA CLASS III (H): ICD-10-CM

## 2023-09-07 DIAGNOSIS — Z95.811 LEFT VENTRICULAR ASSIST DEVICE PRESENT (H): ICD-10-CM

## 2023-09-07 DIAGNOSIS — I50.22 CHRONIC SYSTOLIC CONGESTIVE HEART FAILURE (H): Primary | ICD-10-CM

## 2023-09-07 DIAGNOSIS — Z95.811 LVAD (LEFT VENTRICULAR ASSIST DEVICE) PRESENT (H): ICD-10-CM

## 2023-09-07 LAB — INR (EXTERNAL): 2.8 (ref 0.9–1.1)

## 2023-09-07 NOTE — PROGRESS NOTES
ANTICOAGULATION MANAGEMENT     Dandy Sands 62 year old male is on warfarin with therapeutic INR result. (Goal INR 2.0-3.0)    Recent labs: (last 7 days)     09/07/23  1747   INR 2.8*       ASSESSMENT     Source(s): Chart Review and Patient/Caregiver Call     Warfarin doses taken: Warfarin taken as instructed  Diet: No new diet changes identified  Medication/supplement changes: None noted  New illness, injury, or hospitalization: No  Signs or symptoms of bleeding or clotting: No  Previous result: Therapeutic last 2(+) visits  Additional findings: None       PLAN     Recommended plan for no diet, medication or health factor changes affecting INR     Dosing Instructions: Continue your current warfarin dose with next INR in 1 week       Summary  As of 9/7/2023      Full warfarin instructions:  7.5 mg every Mon, Thu; 6.25 mg all other days   Next INR check:  9/14/2023               Telephone call with Dandy who verbalizes understanding and agrees to plan and who agrees to plan and repeated back plan correctly    Patient using outside facility for labs    Education provided:   None required    Plan made per [facility] anticoagulation protocol and per LVAD protocol    Chon Tinsley, RN  Anticoagulation Clinic  9/7/2023    _______________________________________________________________________     Anticoagulation Episode Summary       Current INR goal:  2.0-3.0   TTR:  51.9 % (10.3 mo)   Target end date:  Indefinite   Send INR reminders to:  ANTICOAG LVAD    Indications    Chronic systolic congestive heart failure (H) [I50.22]  LVAD (left ventricular assist device) present (H) [Z95.811]  Heart failure with reduced ejection fraction  NYHA class III (H) [I50.20]  Left ventricular assist device present (H) [Z95.811]             Comments:  Follow VAD Anticoag protocol:Yes: HeartMate 3   Bridging: No bridging epistaxis.   Date VAD placed: 7/8/22             Anticoagulation Care Providers       Provider Role Specialty Phone number     Kelly Lora MD Referring Cardiovascular Disease [phone]

## 2023-09-14 ENCOUNTER — ANTICOAGULATION THERAPY VISIT (OUTPATIENT)
Dept: ANTICOAGULATION | Facility: CLINIC | Age: 63
End: 2023-09-14

## 2023-09-14 DIAGNOSIS — Z95.811 LVAD (LEFT VENTRICULAR ASSIST DEVICE) PRESENT (H): ICD-10-CM

## 2023-09-14 DIAGNOSIS — I50.22 CHRONIC SYSTOLIC CONGESTIVE HEART FAILURE (H): Primary | ICD-10-CM

## 2023-09-14 DIAGNOSIS — Z95.811 LEFT VENTRICULAR ASSIST DEVICE PRESENT (H): ICD-10-CM

## 2023-09-14 DIAGNOSIS — I50.20 HEART FAILURE WITH REDUCED EJECTION FRACTION, NYHA CLASS III (H): ICD-10-CM

## 2023-09-14 LAB — INR (EXTERNAL): 2.8 (ref 0.9–1.1)

## 2023-09-14 NOTE — PROGRESS NOTES
ANTICOAGULATION MANAGEMENT     Dandy Sands 62 year old male is on warfarin with therapeutic INR result. (Goal INR 2.0-3.0)    Recent labs: (last 7 days)     09/14/23  0000   INR 2.8*       ASSESSMENT     Source(s): Chart Review and Patient/Caregiver Call     Warfarin doses taken: Warfarin taken as instructed  Diet: No new diet changes identified  Medication/supplement changes: None noted  New illness, injury, or hospitalization: No  Signs or symptoms of bleeding or clotting: No  Previous result: Therapeutic last 2(+) visits  Additional findings: None       PLAN     Recommended plan for no diet, medication or health factor changes affecting INR     Dosing Instructions: Continue your current warfarin dose with next INR in 2 weeks       Summary  As of 9/14/2023      Full warfarin instructions:  7.5 mg every Mon, Thu; 6.25 mg all other days   Next INR check:  9/28/2023               Telephone call with Dandy who verbalizes understanding and agrees to plan    Patient using outside facility for labs    Education provided:   Please call back if any changes to your diet, medications or how you've been taking warfarin    Plan made per [facility] anticoagulation protocol and per LVAD protocol    Kateryna Carter RN  Anticoagulation Clinic  9/14/2023    _______________________________________________________________________     Anticoagulation Episode Summary       Current INR goal:  2.0-3.0   TTR:  52.8 % (10.5 mo)   Target end date:  Indefinite   Send INR reminders to:  ANTICOAG LVAD    Indications    Chronic systolic congestive heart failure (H) [I50.22]  LVAD (left ventricular assist device) present (H) [Z95.811]  Heart failure with reduced ejection fraction  NYHA class III (H) [I50.20]  Left ventricular assist device present (H) [Z95.811]             Comments:  Follow VAD Anticoag protocol:Yes: HeartMate 3   Bridging: No bridging epistaxis.   Date VAD placed: 7/8/22             Anticoagulation Care Providers       Provider  Role Specialty Phone number    Kelly Lora MD Referring Cardiovascular Disease [phone]

## 2023-09-21 LAB — BACTERIA ABSC ANAEROBE+AEROBE CULT: NO GROWTH

## 2023-09-28 ENCOUNTER — TRANSFERRED RECORDS (OUTPATIENT)
Dept: HEALTH INFORMATION MANAGEMENT | Facility: CLINIC | Age: 63
End: 2023-09-28

## 2023-09-28 DIAGNOSIS — Z95.811 LEFT VENTRICULAR ASSIST DEVICE PRESENT (H): ICD-10-CM

## 2023-09-28 LAB — INR (EXTERNAL): 2.9 (ref 0.9–1.1)

## 2023-09-29 ENCOUNTER — ANTICOAGULATION THERAPY VISIT (OUTPATIENT)
Dept: ANTICOAGULATION | Facility: CLINIC | Age: 63
End: 2023-09-29

## 2023-09-29 DIAGNOSIS — Z95.811 LEFT VENTRICULAR ASSIST DEVICE PRESENT (H): ICD-10-CM

## 2023-09-29 DIAGNOSIS — I50.20 HEART FAILURE WITH REDUCED EJECTION FRACTION, NYHA CLASS III (H): ICD-10-CM

## 2023-09-29 DIAGNOSIS — Z95.811 LVAD (LEFT VENTRICULAR ASSIST DEVICE) PRESENT (H): ICD-10-CM

## 2023-09-29 DIAGNOSIS — I50.22 CHRONIC SYSTOLIC CONGESTIVE HEART FAILURE (H): Primary | ICD-10-CM

## 2023-09-29 NOTE — PROGRESS NOTES
ANTICOAGULATION MANAGEMENT     Dandy Sands 62 year old male is on warfarin with therapeutic INR result. (Goal INR 2.0-3.0)    Recent labs: (last 7 days)     09/28/23  0000   8/28 2.9       ASSESSMENT     Source(s): Chart Review and Patient/Caregiver Call     Warfarin doses taken: Warfarin taken as instructed  Diet: No new diet changes identified  Medication/supplement changes: None noted  New illness, injury, or hospitalization: No  Signs or symptoms of bleeding or clotting: No  Previous result: Therapeutic last 2(+) visits  Additional findings: None       PLAN     Recommended plan for no diet, medication or health factor changes affecting INR     Dosing Instructions: Continue your current warfarin dose with next INR in 2 weeks       Summary  As of 9/29/2023      Full warfarin instructions:  7.5 mg every Mon, Thu; 6.25 mg all other days   Next INR check:  10/12/2023               Telephone call with Dandy who verbalizes understanding and agrees to plan and who agrees to plan and repeated back plan correctly    Patient using outside facility for labs    Education provided:   Taking warfarin: Importance of taking warfarin as instructed  Goal range and lab monitoring: goal range and significance of current result, Importance of therapeutic range, and Importance of following up at instructed interval    Plan made per [facility] anticoagulation protocol and per LVAD protocol    Holli Silva RN  Anticoagulation Clinic  9/29/2023    _______________________________________________________________________     Anticoagulation Episode Summary       Current INR goal:  2.0-3.0   TTR:  54.8 % (11 mo)   Target end date:  Indefinite   Send INR reminders to:  ANTICOAG LVAD    Indications    Chronic systolic congestive heart failure (H) [I50.22]  LVAD (left ventricular assist device) present (H) [Z95.811]  Heart failure with reduced ejection fraction  NYHA class III (H) [I50.20]  Left ventricular assist device present (H)  [N54.627]             Comments:  Follow VAD Anticoag protocol:Yes: HeartMate 3   Bridging: No bridging epistaxis.   Date VAD placed: 7/8/22             Anticoagulation Care Providers       Provider Role Specialty Phone number    Kelly Lora MD Referring Cardiovascular Disease [phone]

## 2023-09-29 NOTE — COMMITTEE REVIEW
Thoracic Patient Discussion Note Transplant Coordinator: Jessica Duff  Transplant MD:   Kelly Lora    Referring Physician:     Committee Review Members:  Cardiovascular Disease ALLY SHAH, RN, Anthony Fonseca, RN, Abiola Palmer, RN, Camelia Cho, RN, GIGI PAVON, RN, Chantal Brasher, RN, Denise Villar, RN   Nurse Practitioner - Adult Health Chantal Sharpe, APRN CNP, Laila España PA-C   Nutrition Verna Montaño, HERMES   Pharmacist Joann Hyman, [facility]    - Clinical Yas Ennis, MS, Juliette Giron, [facility]   Transplant Lo Jones, LITA, Rosaline Mendez MD, Keri Escobar MD, Remedios Maravilla, LITA, Darnell Morgan MD, Yoli Askew MD, Hayden Veras MD, Justo Stewart MD, Kelly Lora MD       Additional Discussion Notes and Findings:  Status 2. ICM presented in cardiogenic shock with very elevated JVP and LFTs. Had Urgent Huddle Monday and he was listed status 2. Blood group O. Got subclavian IABP yesterday. Antibodies were reduced and now cPRA is 30%. RV function looks okay on inotrope and IABP.   Pt would not be a good VAD candidate d/t RV, history of lupus (has been on immunosuppression so higher risk for VAD complication of DLES infection), and we want to avoid a sensitizing event. A lifelong VAD would have complications. Could we use those as exception criteria? Will likely apply for status 2e extension at the end of the two week period if not transplanted.    Undermining Type: Entire Wound

## 2023-10-03 DIAGNOSIS — Z95.811 LVAD (LEFT VENTRICULAR ASSIST DEVICE) PRESENT (H): Primary | ICD-10-CM

## 2023-10-03 RX ORDER — WARFARIN SODIUM 2.5 MG/1
TABLET ORAL
Qty: 100 TABLET | Refills: 1 | Status: ON HOLD | OUTPATIENT
Start: 2023-10-03 | End: 2024-06-13

## 2023-10-03 NOTE — TELEPHONE ENCOUNTER
ANTICOAGULATION MANAGEMENT:  Medication Refill    Anticoagulation Summary  As of 9/29/2023      Warfarin maintenance plan:  7.5 mg (2.5 mg x 3) every Mon, Thu; 6.25 mg (2.5 mg x 2.5) all other days   Next INR check:  10/12/2023   Target end date:  Indefinite    Indications    Chronic systolic congestive heart failure (H) [I50.22]  LVAD (left ventricular assist device) present (H) [Z95.811]  Heart failure with reduced ejection fraction  NYHA class III (H) [I50.20]  Left ventricular assist device present (H) [Z95.811]                 Anticoagulation Care Providers       Provider Role Specialty Phone number    Kelly Lora MD Referring Cardiovascular Disease [phone]            Refill Criteria    Visit with referring provider/group: Meets criteria: office visit within referring provider group in the last 1 year on 8/24/23    [facility] referral signed last signed: 10/18/2022; within last year: Yes    Lab monitoring not exceeding 2 weeks overdue: Yes    Dandy meets all criteria for refill. Rx instructions and quantity supplied updated to match patient's current dosing plan.  100 day supply with 1 refills granted per [facility] protocol     Riri Giron RN  Anticoagulation Clinic

## 2023-10-12 ENCOUNTER — TELEPHONE (OUTPATIENT)
Dept: ANTICOAGULATION | Facility: CLINIC | Age: 63
End: 2023-10-12

## 2023-10-12 DIAGNOSIS — Z95.811 LVAD (LEFT VENTRICULAR ASSIST DEVICE) PRESENT (H): ICD-10-CM

## 2023-10-12 DIAGNOSIS — Z95.811 LEFT VENTRICULAR ASSIST DEVICE PRESENT (H): ICD-10-CM

## 2023-10-12 DIAGNOSIS — I50.22 CHRONIC SYSTOLIC CONGESTIVE HEART FAILURE (H): Primary | ICD-10-CM

## 2023-10-12 DIAGNOSIS — I50.20 HEART FAILURE WITH REDUCED EJECTION FRACTION, NYHA CLASS III (H): ICD-10-CM

## 2023-10-12 NOTE — TELEPHONE ENCOUNTER
ANTICOAGULATION  MANAGEMENT     Interacting Medication Review    Interacting medication(s): Sulfamethoxazole-Trimethoprim (Bactrim) with warfarin.    Duration:  Magna ID: Serratia again growing on culture, now resistant to Ciprofloxacin and Levofloxacin     stop the Cipro, and start Bactrim twice daily. Like for at least 2 weeks or more. Then proceed with suppression treatment with Bactrim, possibly Bactrim SS twice daily.     sulfamethoxazole-trimethoprim double strength (BACTRIM DS, SEPTRA DS) 800-160 mg double strength tablet . Take 1 tablet by mouth 2 times a day   Start 10/12/23 ordered until 11/11/23    Indication:  driveline infection    New medication?: Yes, interaction may increase INR and risk of bleeding. With Sulfamethoxazole-Trimethoprim, [facility] protocol Appendix G recommends empiric reduction of warfarin dose in therapeutic/supratherapeutic patients.        PLAN     Decrease your warfarin dose (10.8% change) with next INR in 5 days       Last INR 9/28/23 2.9, due for a recheck INR today-no INR checked today    Summary  As of 10/12/2023      Full warfarin instructions:  10/12: 7.5 mg; Otherwise 5 mg every Mon, Fri; 6.25 mg all other days   Next INR check:  10/18/2023             Dandy has not picked up the script for Bactrim yet. He will  tonight or tomorrow and START tomorrow. He will take previous warfarin dose of 7.5 mg tonight, then start decreased maintenance dose tomorrow (after starting Bactrim)    Telephone call with Dandy who agrees to plan and repeated back plan correctly    Maintenance dose modified on anticoagulation calendar for ongoing interaction    Plan made per [facility] anticoagulation protocol and per LVAD protocol    LORENA MOSES RN  Anticoagulation Clinic

## 2023-10-13 ENCOUNTER — CARE COORDINATION (OUTPATIENT)
Dept: CARDIOLOGY | Facility: CLINIC | Age: 63
End: 2023-10-13

## 2023-10-16 ENCOUNTER — CARE COORDINATION (OUTPATIENT)
Dept: CARDIOLOGY | Facility: CLINIC | Age: 63
End: 2023-10-16

## 2023-10-18 ENCOUNTER — TELEPHONE (OUTPATIENT)
Dept: ANTICOAGULATION | Facility: CLINIC | Age: 63
End: 2023-10-18

## 2023-10-18 DIAGNOSIS — Z95.811 LVAD (LEFT VENTRICULAR ASSIST DEVICE) PRESENT (H): ICD-10-CM

## 2023-10-18 DIAGNOSIS — Z95.811 LEFT VENTRICULAR ASSIST DEVICE PRESENT (H): ICD-10-CM

## 2023-10-18 DIAGNOSIS — I50.20 HEART FAILURE WITH REDUCED EJECTION FRACTION, NYHA CLASS III (H): ICD-10-CM

## 2023-10-18 DIAGNOSIS — I50.22 CHRONIC SYSTOLIC CONGESTIVE HEART FAILURE (H): Primary | ICD-10-CM

## 2023-10-18 NOTE — TELEPHONE ENCOUNTER
Writer calls patient to inquire when he will check his INR since he started the Bactrim.  Patient reports that he just started the Bactrim 2 days ago.  Patient will be at the clinic on 10/20/23 to see ID.  Dandy reports that he started 7.5mg MF and 5mg all other days.  Writer informs patient that this was not what was recommended.  Dandy prefers to continue with current dosing and wait to see what results are on 10/20/23.  Dandy understands that Bactrim can increase INR level.

## 2023-10-24 ENCOUNTER — TELEPHONE (OUTPATIENT)
Dept: ANTICOAGULATION | Facility: CLINIC | Age: 63
End: 2023-10-24

## 2023-10-24 NOTE — TELEPHONE ENCOUNTER
ANTICOAGULATION     Dandy EDUARD Shavon is overdue for an INR check.     Spoke with Dandy who states he will have INR drawn today or tomorrow. I requested he call [facility] to notify us that he had it drawn so we can update CareEverywhere.     James Yoder RN

## 2023-10-26 ENCOUNTER — ANTICOAGULATION THERAPY VISIT (OUTPATIENT)
Dept: ANTICOAGULATION | Facility: CLINIC | Age: 63
End: 2023-10-26

## 2023-10-26 DIAGNOSIS — Z95.811 LEFT VENTRICULAR ASSIST DEVICE PRESENT (H): ICD-10-CM

## 2023-10-26 DIAGNOSIS — I50.20 HEART FAILURE WITH REDUCED EJECTION FRACTION, NYHA CLASS III (H): ICD-10-CM

## 2023-10-26 DIAGNOSIS — I50.22 CHRONIC SYSTOLIC CONGESTIVE HEART FAILURE (H): Primary | ICD-10-CM

## 2023-10-26 DIAGNOSIS — Z95.811 LVAD (LEFT VENTRICULAR ASSIST DEVICE) PRESENT (H): ICD-10-CM

## 2023-10-26 LAB — INR (EXTERNAL): 2.8 (ref 0.9–1.1)

## 2023-10-26 NOTE — PROGRESS NOTES
ANTICOAGULATION MANAGEMENT     Dandy Sands 63 year old male is on warfarin with therapeutic INR result. (Goal INR 2.0-3.0)    Recent labs: (last 7 days)     10/26/23  1406   INR 2.8*       ASSESSMENT     Source(s): Chart Review and Patient/Caregiver Call     Warfarin doses taken: Warfarin taken as instructed  Diet: No new diet changes identified  Medication/supplement changes: Pt has been on different antibiotics as they are trying to treat his driveline infection.   Dates per pt:  Bactrim 10/18-10/21  Ertapenem 10/21-present. Will be on it for one more week then will follow up with ID.  New illness, injury, or hospitalization: Having some diarrhea. Pt states it is manageable with anti-diarrheals.  Signs or symptoms of bleeding or clotting: No  Previous result: Therapeutic last 2(+) visits  Additional findings: None       PLAN     Recommended plan for temporary change(s) affecting INR     Dosing Instructions: Continue your current warfarin dose with next INR in 1 week       Summary  As of 10/26/2023      Full warfarin instructions:  7.5 mg every Mon, Thu; 5 mg all other days   Next INR check:  11/2/2023               Telephone call with Dandy who verbalizes understanding and agrees to plan    Patient using outside facility for labs    Education provided:   Goal range and lab monitoring: goal range and significance of current result    Plan made per [facility] anticoagulation protocol    James Yoder RN  Anticoagulation Clinic  10/26/2023    _______________________________________________________________________     Anticoagulation Episode Summary       Current INR goal:  2.0-3.0   TTR:  57.0% (11.3 mo)   Target end date:  Indefinite   Send INR reminders to:  ANTICOAG LVAD    Indications    Chronic systolic congestive heart failure (H) [I50.22]  LVAD (left ventricular assist device) present (H) [Z95.811]  Heart failure with reduced ejection fraction  NYHA class III (H) [I50.20]  Left ventricular assist device present (H)  [J38.315]             Comments:  Follow VAD Anticoag protocol:Yes: HeartMate 3   Bridging: No bridging epistaxis.   Date VAD placed: 7/8/22             Anticoagulation Care Providers       Provider Role Specialty Phone number    Kelly Lora MD Referring Cardiovascular Disease [phone]

## 2023-10-28 ENCOUNTER — CARE COORDINATION (OUTPATIENT)
Dept: CARDIOLOGY | Facility: CLINIC | Age: 63
End: 2023-10-28

## 2023-10-28 LAB — INR (EXTERNAL): 3 (ref 0.9–1.1)

## 2023-10-28 NOTE — PROGRESS NOTES
"Pt's son paged the VAD Coordinator on call to report \"mobility issues\".   Son stating that when pt tried to get up this am he was unable to stand. Legs were weak and he had dizziness. Pt states he was up all night, but didn't try to get out of bed until this am and found that he could not. Son had already called EMS and they were arriving as we were talking. EMS will likely bring to [facility] ED. Instructed pt's son to page back if there are issues or concerns at the ED with needing to transfer here.   "

## 2023-10-29 LAB — INR (EXTERNAL): 2.8 (ref 0.9–1.1)

## 2023-10-30 ENCOUNTER — TELEPHONE (OUTPATIENT)
Dept: INFECTIOUS DISEASES | Facility: CLINIC | Age: 63
End: 2023-10-30

## 2023-10-30 NOTE — TELEPHONE ENCOUNTER
Infectious Diseases Telephone Note:   10/30/2023     Talked with [facility] team. Mr. 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 No bacteremia, no abscess. Fluoroquinolone resistand. Did not respond quickly to Bactrim but drainage improving after 2 week course of ertapenem. Plan going forward is for ongoing dalbavancin for Corynebacterium suppression + Bactrim suppression for Serratia. Wondering about visit with us to coordinate with cardiology visit 11/28. I'll add him on for 11/28 at 11:00.     If the Serratia breaks through Bactrim suppression, could consider checking tigecycline sensitivity as a surrogate for omadacycline. If it's sensitive and if we could get it approved, omadacycline might be an oral option for both the Corynebacterium and Serratia.     Farida Charlton MD  Infectious Diseases  Pager [phone]

## 2023-11-02 ENCOUNTER — CARE COORDINATION (OUTPATIENT)
Dept: CARDIOLOGY | Facility: CLINIC | Age: 63
End: 2023-11-02

## 2023-11-02 ENCOUNTER — TELEPHONE (OUTPATIENT)
Dept: ANTICOAGULATION | Facility: CLINIC | Age: 63
End: 2023-11-02

## 2023-11-02 DIAGNOSIS — Z95.811 LEFT VENTRICULAR ASSIST DEVICE PRESENT (H): ICD-10-CM

## 2023-11-02 DIAGNOSIS — I50.22 CHRONIC SYSTOLIC CONGESTIVE HEART FAILURE (H): Primary | ICD-10-CM

## 2023-11-02 DIAGNOSIS — Z95.811 LVAD (LEFT VENTRICULAR ASSIST DEVICE) PRESENT (H): ICD-10-CM

## 2023-11-02 DIAGNOSIS — I50.20 HEART FAILURE WITH REDUCED EJECTION FRACTION, NYHA CLASS III (H): ICD-10-CM

## 2023-11-02 NOTE — PROGRESS NOTES
Called patient/caregiver to check in post hospital discharge s/t gastroenteritis. Pt reports VAD parameters WNL and weight WNL. Reviewed medications and answered any questions. Patient is able to move around the house and care for himself with minimal assistance. Pt has not had any emesis episodes since discharge from the hospital. Pt was seen today in local clinic for post-discharge assessment. Pt seems to be in good spirits and will contact the VAD coordinator on call with any new symptoms, abnormal VAD parameters, alarms, and/or concerns.

## 2023-11-02 NOTE — TELEPHONE ENCOUNTER
ANTICOAGULATION  MANAGEMENT: Discharge Review    Dandy Sands chart reviewed for anticoagulation continuity of care    Hospital Admission on 10/28/23 to 10/29/23 for N, V, & diarrhea.  10/28/23 - 10/29/23, [facility] hospitalization: Vomiting, diarrhea, generalized weakness, fatigue.  10/29/23, Dr. Rodríguez: He was hospitalized with diarrhea, low grade fever. Did have recent COVID booster. Feeling much better today. PLAN: Possible Colitis vs. Vaccine response? Ok to discharge and follow up with ID as scheduled.     Discharge disposition: Home    Results:    Recent labs: (last 7 days)     10/28/23  0000 10/29/23  0000   INR 3.0* 2.8*     Anticoagulation inpatient management:     home regimen continued    Anticoagulation discharge instructions:     Warfarin dosing: home regimen continued   Bridging: No   INR goal change: No      Medication changes affecting anticoagulation: Yes:    CONTINUE taking these medications which have CHANGED     CONTINUE: lisinopril 10 mg tablet  Commonly known as: PRINIVIL, ZESTRIL  TAKE 1 Tablet BY MOUTH TWICE DAILY  What changed:   how much to take  how to take this  when to take this  additional instructions      CONTINUE taking these medications which have NOT CHANGED     CONTINUE: acetaminophen 500 mg tablet  Commonly known as: TYLENOL  Dose: 1,000 mg  Take 1,000 mg by mouth every 4 to 6 hours as needed for mild pain.    CONTINUE: aspirin 81 mg chewable tablet  Dose: 81 mg  Take 81 mg by mouth 1 time per day at noon    CONTINUE: atorvaSTATin 40 mg tablet  Commonly known as: LIPITOR  Dose: 40 mg  Take 1 tablet (40 mg) by mouth every night at bedtime    CONTINUE: DALVANCE IV  Administer intravenously Every 2 (two) WEEKS    CONTINUE: ertapenem (INVanz) 1000 mg/100 mL IV infusion  Dose: 1,000 mg  Administer 1,000 mg intravenously Every 24 hours For LVAD driveline infection    CONTINUE: gabapentin 100 mg capsule  Commonly known as: NEURONTIN  TAKE 1 Capsule BY MOUTH EVERY MORNING, NOON &  BEDTIME    CONTINUE: Hemorrhoidal 1-0.25-14.4-15 % cream  Generic drug: PREPARATION H  Dose: 1 application   Insert 1 application rectally 2 times a day as needed for itching    CONTINUE: hydrOXYchloroQUINE 200 mg tablet  Commonly known as: PLAQUENIL  TAKE 1 AND 1/2 TABLETS BY MOUTH EVERY MORNING    CONTINUE: Mens Multivitamin Tabs  Dose: 1 tablet  Take 1 tablet by mouth 1 time per day    CONTINUE: omeprazole 40 mg capsule  Commonly known as: PRILOSEC  TAKE ONE Capsule BY MOUTH EVERY MORNING & EVENING (before meals)    CONTINUE: tamsulosin 0.4 mg capsule  Commonly known as: FLOMAX  Dose: 0.4 mg  Take 1 capsule (0.4 mg) by mouth every night at bedtime    CONTINUE: triamcinolone acetonide 0.025% ointment  Commonly known as: KENALOG,ARISTOCORT  Apply topically 2 times a day as needed for itching or rash                    STOP taking these medications     STOP: ciprofloxacin 500 mg tablet  Commonly known as: CIPRO    STOP: minocycline 100 mg capsule  Commonly known as: MINOCIN          ASK your doctor about these medications     amoxicillin 500 mg capsule  Commonly known as: AMOXIL  Dose: 2,000 mg  Take 2,000 mg by mouth as needed for other (Specify) (Take 1 hour prior to dental appointments)     Additional factors affecting anticoagulation: Yes: Discussed with patient my recent discussion with Dr. Charlton from [facility]. He reports they did reach out to him and he is trying to coordinate all of his [facility] follow up visits on one trip/date. He reports he is feeling much better, no pain, drainage has decreased at his LVAD drive line site. No fevers or chills. He reports he spent 1 night in the hospital recently after getting his COVID booster, and he states he has been hospitalized 3 out of 4 times when he had COVID vaccinations. He reports his diarrhea, weakness, fatigue, vomiting have now all resolved and he is doing well. He denies side effects from the ertapenem. Again discussed our plans for long-term suppression with  Bactrim, and the importance of his [facility] follow-up to help assess long-term treatment plans and goals.        PLAN     No adjustment to anticoagulation plan needed    Spoke with Dandy    Anticoagulation Calendar updated    Chon Tinsley RN

## 2023-11-03 ENCOUNTER — CARE COORDINATION (OUTPATIENT)
Dept: CARDIOLOGY | Facility: CLINIC | Age: 63
End: 2023-11-03

## 2023-11-04 NOTE — PROGRESS NOTES
D: patient called on call vad coordinator regarding fever and body aches.     I/A: Patients states he was recently hospitalized for diarrhea, dehydration last weekend. States he started to get a fever and has body aches.   No changes to lvad driveline exit site- managed by ID, recently changed antibiotics. No change to vad parameters.       P: He feels likely this is a virus, his son thought he should go to hospital for evaluation. I also recommended he go to ED for evaluation, he does not want to go at this time but states he will go if it gets worse. Will discuss with primary coordinator, cardiologist.  Patient notified to page on-call coordinator if symptoms worsen or with other concerns. Patient verbalized understanding.

## 2023-11-06 NOTE — PROGRESS NOTES
Called to follow up after phone call this weekend. Patient did not answer, left voicemail telling him that I was calling to check in and make sure he was feeling better. Told him to page with further questions/ concerns.

## 2023-11-09 ENCOUNTER — ANTICOAGULATION THERAPY VISIT (OUTPATIENT)
Dept: ANTICOAGULATION | Facility: CLINIC | Age: 63
End: 2023-11-09

## 2023-11-09 DIAGNOSIS — Z95.811 LEFT VENTRICULAR ASSIST DEVICE PRESENT (H): ICD-10-CM

## 2023-11-09 DIAGNOSIS — I50.22 CHRONIC SYSTOLIC CONGESTIVE HEART FAILURE (H): Primary | ICD-10-CM

## 2023-11-09 DIAGNOSIS — Z95.811 LVAD (LEFT VENTRICULAR ASSIST DEVICE) PRESENT (H): ICD-10-CM

## 2023-11-09 DIAGNOSIS — I50.20 HEART FAILURE WITH REDUCED EJECTION FRACTION, NYHA CLASS III (H): ICD-10-CM

## 2023-11-09 LAB — INR (EXTERNAL): 4.8 (ref 0.9–1.1)

## 2023-11-10 NOTE — PROGRESS NOTES
ANTICOAGULATION MANAGEMENT     Dandy CHAPA Shavon 63 year old male is on warfarin with supratherapeutic INR result. (Goal INR 2.0-3.0)    Recent labs: (last 7 days)     11/09/23  1801   INR 4.8*       ASSESSMENT     Source(s): Chart Review and Patient/Caregiver Call     Warfarin doses taken: Warfarin taken as instructed  Diet:  infection may be affecting diet and INR  Medication/supplement changes:   ertapenem (INVanz) 1000 mg/100 mL IV infusion  Administer 1,000 mg intravenously Every 24 hours For LVAD driveline infection   0   11/09/2023      New illness, injury, or hospitalization: Yes: DLES infection  Signs or symptoms of bleeding or clotting: No  Previous result: Therapeutic last 2(+) visits  Additional findings:  antibiotics every 24 hour, IV       PLAN     Recommended plan for temporary change(s) and ongoing change(s) affecting INR     Dosing Instructions: partial hold then continue your current warfarin dose with next INR in 5 days       Summary  As of 11/9/2023      Full warfarin instructions:  11/9: 2.5 mg; Otherwise 7.5 mg every Mon, Thu; 5 mg all other days   Next INR check:  11/14/2023               Telephone call with Dandy who verbalizes understanding and agrees to plan and who agrees to plan and repeated back plan correctly    Patient using outside facility for labs    Education provided:   Taking warfarin: prescribed tablet strength and color  Interaction IS anticipated between warfarin and IV antibiotics  Symptom monitoring: monitoring for bleeding signs and symptoms, monitoring for clotting signs and symptoms, monitoring for stroke signs and symptoms, when to seek medical attention/emergency care, and if you hit your head or have a bad fall seek emergency care  Importance of notifying anticoagulation clinic for: changes in medications; a sooner lab recheck maybe needed, diarrhea, nausea/vomiting, reduced intake, cold/flu, and/or infections; a sooner lab recheck maybe needed, upcoming surgeries and  procedures 2 weeks in advance, and if you did not receive dosing instructions on the same day as your labs were checked    Plan made per [facility] anticoagulation protocol and per LVAD protocol    Chon Tinsley, RN  Anticoagulation Clinic  11/9/2023    _______________________________________________________________________     Anticoagulation Episode Summary       Current INR goal:  2.0-3.0   TTR:  54.9% (11.5 mo)   Target end date:  Indefinite   Send INR reminders to:  ANTICOAG LVAD    Indications    Chronic systolic congestive heart failure (H) [I50.22]  LVAD (left ventricular assist device) present (H) [Z95.811]  Heart failure with reduced ejection fraction  NYHA class III (H) [I50.20]  Left ventricular assist device present (H) [Z95.811]             Comments:  Follow VAD Anticoag protocol:Yes: HeartMate 3   Bridging: No bridging epistaxis.   Date VAD placed: 7/8/22             Anticoagulation Care Providers       Provider Role Specialty Phone number    Kelly Lora MD Referring Cardiovascular Disease [phone]

## 2023-11-14 ENCOUNTER — TELEPHONE (OUTPATIENT)
Dept: ANTICOAGULATION | Facility: CLINIC | Age: 63
End: 2023-11-14

## 2023-11-14 ENCOUNTER — DOCUMENTATION ONLY (OUTPATIENT)
Dept: ANTICOAGULATION | Facility: CLINIC | Age: 63
End: 2023-11-14

## 2023-11-14 DIAGNOSIS — I50.22 CHRONIC SYSTOLIC CONGESTIVE HEART FAILURE (H): Primary | ICD-10-CM

## 2023-11-14 DIAGNOSIS — I50.20 HEART FAILURE WITH REDUCED EJECTION FRACTION, NYHA CLASS III (H): ICD-10-CM

## 2023-11-14 DIAGNOSIS — Z95.811 LEFT VENTRICULAR ASSIST DEVICE PRESENT (H): ICD-10-CM

## 2023-11-14 DIAGNOSIS — Z95.811 LVAD (LEFT VENTRICULAR ASSIST DEVICE) PRESENT (H): ICD-10-CM

## 2023-11-14 NOTE — TELEPHONE ENCOUNTER
ANTICOAGULATION     Dandy Sands is overdue for an INR check.     Spoke with Dandy and patient will have labs checked at next office visit on 11/15/23    Chon Tinsley RN

## 2023-11-14 NOTE — PROGRESS NOTES
ANTICOAGULATION CLINIC REFERRAL RENEWAL REQUEST       An annual renewal order is required for all patients referred to [facility].?  Please review and sign the pended referral order for Dandy Sands.       ANTICOAGULATION SUMMARY      Warfarin indication(s)   LVAD    Mechanical heart valve present?  NO       Current goal range   INR: 2.0-3.0     Goal appropriate for indication? Goal INR 2-3, standard for indication(s) above     Time in Therapeutic Range (TTR)  (Goal > 60%) 54.9%       Office visit with referring provider's group within last year yes on 8/24/23       Chon Tinsley, RN  [facility]

## 2023-11-15 ENCOUNTER — ANTICOAGULATION THERAPY VISIT (OUTPATIENT)
Dept: ANTICOAGULATION | Facility: CLINIC | Age: 63
End: 2023-11-15

## 2023-11-15 DIAGNOSIS — I50.20 HEART FAILURE WITH REDUCED EJECTION FRACTION, NYHA CLASS III (H): ICD-10-CM

## 2023-11-15 DIAGNOSIS — I50.22 CHRONIC SYSTOLIC CONGESTIVE HEART FAILURE (H): Primary | ICD-10-CM

## 2023-11-15 DIAGNOSIS — Z95.811 LEFT VENTRICULAR ASSIST DEVICE PRESENT (H): ICD-10-CM

## 2023-11-15 DIAGNOSIS — Z95.811 LVAD (LEFT VENTRICULAR ASSIST DEVICE) PRESENT (H): ICD-10-CM

## 2023-11-15 LAB — INR (EXTERNAL): 4.7 (ref 0.9–1.1)

## 2023-11-15 NOTE — PROGRESS NOTES
ANTICOAGULATION MANAGEMENT     Dandy Sands 63 year old male is on warfarin with supratherapeutic INR result. 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), Wound of sternal region Take 1 tablet by mouth 2 times a day 180 tablet  0 11/15/2023 02/13/2024      New illness, injury, or hospitalization: Yes: patient is having loose stools intermittently with Antibiotic course, Hospitalized for GI symptoms & DLES infection  Signs or symptoms of bleeding or clotting: No  Previous result: Supratherapeutic  Additional findings: Vancomycin: 6/9/23 - 6/21/23  Bactrim: 7/14/23 - 8/13/23  Ciprofloxacin: 8/30/23 - 10/4/23  Dalbavancin 6/21/23 - every 2 weeks Is due for Dalbavancin later this week 11/17/23    Bactrim: 10/12/23 - 10/18/23  Ertapenem: Stopped 11/15/23  RESTARTED BACTRIM on 11/15/23   11/15/23 visit with provider at [facility]:     Patient previously discussed with Dr. Wilson and recently discussed with Dr. Charlton from [facility]: She is in agreement with our current plan to follow Ertapenem with Bactrim suppression. Will coordinate a visit with patient when he is at the [facility] to see cardiology in December.  Continue suppression with Bactrim DS twice daily. Anti-coag clinic/LVAD team aware of limited antibiotic options and need for INR monitoring while on this medication.  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  Labs every 2 weeks per therapy plan. Reviewed no issues noted. Added on BMP to today's INR check, given his current bactrim use.  Follow up with ID in 4-6 weeks, after visit with Ranjan Sooner if any concerns. Contact us with any concerns of worsening/recurring infection such as fevers, chills, increased pain, redness, swelling, drainage.      PLAN     Recommended plan for ongoing change(s) affecting INR     Dosing Instructions: Warfarin dosin% reduction off last 7 days; 25% off maintenance dose while on Bactrim  with next INR in 5days     Summary  As of 11/15/2023      Full warfarin instructions:  11/15: Hold; Otherwise 2.5 mg every Sun, Wed; 5 mg all other days   Next INR check:  2023               Telephone call with Dandy who verbalizes understanding and agrees to plan and who agrees to plan and repeated back plan correctly    Patient using outside facility for labs    Education provided:   Taking warfarin: take warfarin at same time each day; preferably in the evening, prescribed tablet strength and color, and Importance of taking warfarin as instructed  Goal range and lab monitoring: Importance of following up at instructed interval  Healthy lifestyle considerations: Discussed when to seek medical attention if patient is dehydrated  Interaction IS anticipated between warfarin and Bactrim  Symptom monitoring: monitoring for bleeding signs and symptoms, monitoring for clotting signs and symptoms, monitoring for stroke signs and symptoms, when to seek medical attention/emergency care, and if you hit your head or have a bad fall seek emergency care  Contact [phone] with any changes, questions or concerns.     Plan made per [facility] anticoagulation protocol and per LVAD protocol    Chon Tinsley, RN  Anticoagulation Clinic  11/15/2023    _______________________________________________________________________     Anticoagulation Episode Summary       Current INR goal:  2.0-3.0   TTR:  53.2%  (11.5 mo)   Target end date:  Indefinite   Send INR reminders to:  ANTICOAG LVAD    Indications    Chronic systolic congestive heart failure (H) [I50.22]  LVAD (left ventricular assist device) present (H) [Z95.811]  Heart failure with reduced ejection fraction  NYHA class III (H) [I50.20]  Left ventricular assist device present (H) [Z95.811]             Comments:  Follow VAD Anticoag protocol:Yes: HeartMate 3   Bridging: No bridging epistaxis.   Date VAD placed: 7/8/22             Anticoagulation Care Providers       Provider Role Specialty Phone number    Kelly Lora MD Referring Cardiovascular Disease [phone]

## 2023-11-20 ENCOUNTER — MYC MEDICAL ADVICE (OUTPATIENT)
Dept: ANTICOAGULATION | Facility: CLINIC | Age: 63
End: 2023-11-20

## 2023-11-21 DIAGNOSIS — Z95.811 LEFT VENTRICULAR ASSIST DEVICE PRESENT (H): ICD-10-CM

## 2023-11-21 DIAGNOSIS — I50.22 CHRONIC SYSTOLIC CONGESTIVE HEART FAILURE (H): ICD-10-CM

## 2023-11-21 DIAGNOSIS — T82.110D ICD (IMPLANTABLE CARDIOVERTER-DEFIBRILLATOR) LEAD FAILURE, SUBSEQUENT ENCOUNTER: ICD-10-CM

## 2023-11-21 DIAGNOSIS — Z95.811 LVAD (LEFT VENTRICULAR ASSIST DEVICE) PRESENT (H): ICD-10-CM

## 2023-11-24 ENCOUNTER — ANTICOAGULATION THERAPY VISIT (OUTPATIENT)
Dept: ANTICOAGULATION | Facility: CLINIC | Age: 63
End: 2023-11-24

## 2023-11-24 DIAGNOSIS — I50.22 CHRONIC SYSTOLIC CONGESTIVE HEART FAILURE (H): Primary | ICD-10-CM

## 2023-11-24 DIAGNOSIS — Z95.811 LEFT VENTRICULAR ASSIST DEVICE PRESENT (H): ICD-10-CM

## 2023-11-24 DIAGNOSIS — I50.20 HEART FAILURE WITH REDUCED EJECTION FRACTION, NYHA CLASS III (H): ICD-10-CM

## 2023-11-24 DIAGNOSIS — Z95.811 LVAD (LEFT VENTRICULAR ASSIST DEVICE) PRESENT (H): ICD-10-CM

## 2023-11-24 LAB — INR (EXTERNAL): 2.3 (ref 0.9–1.1)

## 2023-11-24 NOTE — PROGRESS NOTES
ANTICOAGULATION MANAGEMENT     Dandy Sands 63 year old male is on warfarin with therapeutic INR result. (Goal INR 2.0-3.0)    Recent labs: (last 7 days)     11/24/23  1120   INR 2.3*       ASSESSMENT     Source(s): Chart Review and Patient/Caregiver Call     Warfarin doses taken: Warfarin taken as instructed  Diet: No new diet changes identified  Medication/supplement changes:  bactrium for another 2 weeks  New illness, injury, or hospitalization: No  Signs or symptoms of bleeding or clotting: had small nose bleed yesterday.   Previous result: Supratherapeutic  Additional findings: None       PLAN     Recommended plan for ongoing change(s) affecting INR     Dosing Instructions: Continue your current warfarin dose with next INR in 1 week       Summary  As of 11/24/2023      Full warfarin instructions:  2.5 mg every Sun, Wed; 5 mg all other days   Next INR check:  11/30/2023               Telephone call with Dandy who verbalizes understanding and agrees to plan    Patient using outside facility for labs    Education provided:   Please call back if any changes to your diet, medications or how you've been taking warfarin  Goal range and lab monitoring: goal range and significance of current result, Importance of therapeutic range, and Importance of following up at instructed interval    Plan made per [facility] anticoagulation protocol    Karen Johnson RN  Anticoagulation Clinic  11/24/2023    _______________________________________________________________________     Anticoagulation Episode Summary       Current INR goal:  2.0-3.0   TTR:  52.1% (11.5 mo)   Target end date:  Indefinite   Send INR reminders to:  ANTICOAG LVAD    Indications    Chronic systolic congestive heart failure (H) [I50.22]  LVAD (left ventricular assist device) present (H) [Z95.811]  Heart failure with reduced ejection fraction  NYHA class III (H) [I50.20]  Left ventricular assist device present (H) [Z95.811]             Comments:  Follow VAD Anticoag  protocol:Yes: HeartMate 3   Bridging: No bridging epistaxis.   Date VAD placed: 7/8/22             Anticoagulation Care Providers       Provider Role Specialty Phone number    Kelly Lora MD Referring Cardiovascular Disease [phone]

## 2023-11-24 NOTE — PROGRESS NOTES
"ANTICOAGULATION MANAGEMENT     Dandy Sands 63 year old male is on warfarin with {Ther/Sub/Supra:721127} INR result. (Goal INR {Anticoag Goal:718020})    No results for input(s): \"INR\" in the last 168 hours.    ASSESSMENT     {accassessment:261656}       PLAN     {INRPLAN:331965}    "

## 2023-11-27 RX ORDER — LISINOPRIL 10 MG/1
TABLET ORAL
Qty: 60 TABLET | Refills: 3 | OUTPATIENT
Start: 2023-11-27

## 2023-11-27 NOTE — TELEPHONE ENCOUNTER
lisinopril (ZESTRIL) 10 MG tablet 225 tablet 3 8/27/2023     Should have refills on file. Pharmacy sent message. Rx refill denied    Amelia Lerma RN

## 2023-11-30 ENCOUNTER — ANTICOAGULATION THERAPY VISIT (OUTPATIENT)
Dept: ANTICOAGULATION | Facility: CLINIC | Age: 63
End: 2023-11-30

## 2023-11-30 DIAGNOSIS — I50.22 CHRONIC SYSTOLIC CONGESTIVE HEART FAILURE (H): Primary | ICD-10-CM

## 2023-11-30 DIAGNOSIS — Z95.811 LEFT VENTRICULAR ASSIST DEVICE PRESENT (H): ICD-10-CM

## 2023-11-30 DIAGNOSIS — I50.20 HEART FAILURE WITH REDUCED EJECTION FRACTION, NYHA CLASS III (H): ICD-10-CM

## 2023-11-30 DIAGNOSIS — Z95.811 LVAD (LEFT VENTRICULAR ASSIST DEVICE) PRESENT (H): ICD-10-CM

## 2023-11-30 LAB — INR (EXTERNAL): 2.6 (ref 0.9–1.1)

## 2023-11-30 NOTE — PROGRESS NOTES
ANTICOAGULATION MANAGEMENT     Dandy Sands 63 year old male is on warfarin with therapeutic INR result. (Goal INR 2.0-3.0)    Recent labs: (last 7 days)     11/30/23  1613   INR 2.6*       ASSESSMENT     Source(s): Chart Review and Patient/Caregiver Call     Warfarin doses taken: Warfarin taken as instructed and Dosing adjusted for Bactrim therapy starting 11/15/23  Diet: No new diet changes identified  Medication/supplement changes: None noted  New illness, injury, or hospitalization: No  Signs or symptoms of bleeding or clotting: No  Previous result: Therapeutic last visit; previously outside of goal range  Additional findings:  Patient has weekly PICC line dressing changes.  Will have an INR drawn at local clinic on 12/6.       PLAN     Recommended plan for ongoing change(s) affecting INR     Dosing Instructions:  New dosing while on Bactrim, 2.5mg every Sun, Wed; 5mg all other days  with next INR in 6 days       Summary  As of 11/30/2023      Full warfarin instructions:  2.5 mg every Sun, Wed; 5 mg all other days   Next INR check:  12/6/2023               Telephone call with Dandy who verbalizes understanding and agrees to plan and who agrees to plan and repeated back plan correctly    Patient using outside facility for labs    Education provided:   Taking warfarin: take warfarin at same time each day; preferably in the evening, prescribed tablet strength and color, and Importance of taking warfarin as instructed  Interaction IS anticipated between warfarin and Bactrim  Importance of notifying anticoagulation clinic for: changes in medications; a sooner lab recheck maybe needed, diarrhea, nausea/vomiting, reduced intake, cold/flu, and/or infections; a sooner lab recheck maybe needed, and if you did not receive dosing instructions on the same day as your labs were checked    Plan made per [facility] anticoagulation protocol and per LVAD protocol    Chon Tinsley, RN  Anticoagulation  Clinic  11/30/2023    _______________________________________________________________________     Anticoagulation Episode Summary       Current INR goal:  2.0-3.0   TTR:  53.8% (11.5 mo)   Target end date:  Indefinite   Send INR reminders to:  ANTICOAG LVAD    Indications    Chronic systolic congestive heart failure (H) [I50.22]  LVAD (left ventricular assist device) present (H) [Z95.811]  Heart failure with reduced ejection fraction  NYHA class III (H) [I50.20]  Left ventricular assist device present (H) [Z95.811]             Comments:  Follow VAD Anticoag protocol:Yes: HeartMate 3   Bridging: No bridging epistaxis.   Date VAD placed: 7/8/22             Anticoagulation Care Providers       Provider Role Specialty Phone number    Kelly Lora MD Referring Cardiovascular Disease [phone]

## 2023-12-03 NOTE — PROGRESS NOTES
[facility]    Cardiology Progress Note        Date of Admission:  6/17/2022     Assessment & Plan: HVSL   61-year-old man with history of ischemic cardiomyopathy LVEF 15%) NYHA class IV symptoms ACC stage D, multiple admissions with heart failure over the past several months. Presented with cardiogenic shock c/b multi-organ failure (JOSE; Acute liver injury/shock liver) requiring mechanical hemodynamic support. Now listed as status 2 for OHT. Course c/b large epistaxis 6/22 with 400 ml blood. Had subclavian IABP placed instead of femoral IABP 6/23/22, adjusted 7/3/22 due to coiling in aortic arch that was asymptomatic and without pulse changes in extremities. Optimized with plan for LVAD placement 7/8/22     Today's Plan:  - continue IABP at 1:1  - Plan for LVAD today; heparin gtt held     Plan: Critical Care      ===============  CARDIOVASCULAR  ===============  # Ambulatory cardiogenic shock SCAI D  # Advanced ischemic cardiomyopathy, Class IV, Stage D  # s/p CRT-D (Medtronic 2006, gen change 2012)  # SVT  # Nonsustained VT  # CAD s/p PCI to LAD (2005)  # Hx of MI (2007)  # Severe ostial LAD disease with in-stent restenosis of mid LAD at diag bifurcation, severe proximal RCA disease, mild circ disease now s/p ostial LAD and proximal RCA lithotripsy and stenting on 5/24/22  CMRI showing infarcted myocardium in LAD territory. Given the degree of LV dysfunction/dilation, moderate to severe functional MR and lack of viable myocardium, cardiac surgery would be high risk. Now s/p ostial LAD and proximal RCA lithotripsy and stenting on 5/24/22. RHC 7/1 to reassess hemodynamics, results as below. Being evaluated for transplant/LVAD, PRA returned with reaction with 97% of antibodies in panel, high risk of acute rejection. Ongoing discussion with immunology regarding any potential reasons for particularly high PRA, suggested oncology consult for evaluation of possible  malignancy causing polyclonal activation. CT Chest/Abdomen/Pelvis without any evidence of new malignancy. Oncology consulted.      RHC 7/1:  RA 6  RV 52/7  PA 53/26 (35)  PCWP 22  Marley CO 3.63, CI 2.06,   TD CO 3.87, CI 2.19  PVR by TD CO 3.3     - Mechanical support: IABP 1:1, continuing (placed 6/23/22)  - Anticoagulation: heparin gtt DVT/PE in setting of APL; on hold this AM for plan for OR  - Volume status: euvolemic and making adequate urine; no diuretics today   - BB: contraindicated with MCS  - ACEi/ARB/ARNI: none  - MRA: none  - SGLT2i: none  - SCD prophylaxis: Medtronic CRT-D      LVAD/transplant workup started, discussion with patient:  - Echocardiogram done  - Cardiopulmonary stress testing ordered  - Infectious labs: Quant Gold (-), VZV IgG (+), Treponema Abs w/reflex RPR/titer (-), Toxoplasma IgG (-), Hepatitis serology (all negative, HepB nonimmune), HSV1/2 IgG (-), HIV combo (-), EBV/CMV IgG (both positive)  - Other labs: blood type (O positive), iron studies, prealbumin 22, UA done, TSH 1.65, PRA single antigen IgG antibody (97%)  - Substance abuse screen: PEth (-), UDS (-)  - RHC done, current hemodynamic monitoring  - Imaging: CT Chest A/P with Chronic interstitial subpleural fibrotic disease with paraseptal emphysematous change, US abdomen complete w/doppler , US JOE Doppler, US LE arterial duplex b/l all unremarkable  - PFTs, 6 Minute Walk Test not done  - Consults: Dental, CVTS, Palliative Care, Nutrition, Neuropsych, Social Work Dental consult  - CVTS consulted  - elevated PRA to 97; discussing with immunology and oncology possible etiologies for this; overall unclear why such significant elevation vs prior     ===========   PULMONARY  ===========  # Mild-Moderate Emphysema  # Hx of COVID-19  Former smoker 2 ppd for 40 years quit on 09/09/09.  - Appreciate pulmonary consultation given emphysema  - ILD panel:                           - autoimmune work- up:                                      -  LASHANDA positive                                      - Anti Helen-1 IgG negative                                      - SSA RO, SSB LA Ab negative                                      - Scleroderma ab scl 70 negative                                      - RF negative                                      - anti CCP 1.7                                      - ANCA IgG < 1:10                                      - Aldolase: 19.6(elevated)              - no need for PFTs with DLCO                                     - if symptoms are considered related to pulm findings: inhaler therapy with LABA/LAMA combination  - low threshold for nebs post OR     =====  Renal  =====  -Strict I&O while diuresing   -Monitor Electrolytes while actively diuresing K->4 Mg->2  #Hypokalemia, resolved  #Hypomagnesemia, resolved  #Hyponatremia, resolved  In setting of HF. On replacement protocols     ===================   GASTROENTEROLOGY  ===================  # Severe malnutrition in the context of chronic illness  # Liver injury, in the setting of cardiogenic shock  # GERD  - RD following  - encourage supplemental nutrition as able  - Supplements to help maintain nutrition.  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  - continue low intensity heparin gtt     # Anemia, chronic  # Iron deficiency  Baseline hgb 8-9. Started to downtrend since nose bleed 6/22. Gradually downtrending without signs of new or ongoing bleeding. Found to have iron deficiency.   - S/p iron IV *3 doses [id number]  - Restart ferrous sulfate 325 mg daily  - continue pediatric tubes for labs, limited blood draw  -  monitor for signs of bleeding     # Profuse epistaxis from the left nare with bloody emesis, resolved  # acute on chronic anemia 2/2 epistaxis     ==   ID  ==  ROHIT     ===============  RHEUMATOLOGY  ===============  #Lupus  #APL  - Continue pta hydroxychloroquine 200mg bid   - PTA mycophenolic acid 1500mg q12h on hold in anticipation for LVAD  -- Holding MMF one week prior to surgery and re-starting 5-7 days after LVAD placement in the absence of surgical site infection, poor wound healing or infection elsewhere.     ===    ===  - hold pta tamsulosin 0.4mg given OR today      Code Status: Full Code       Disposition:  - ICU    The patient's care was discussed with the Attending Physician, Dr. Askew, Bedside Nurse and Patient.    Emelyn Meneses MD  [facility]    ______________________________________________________________________    Interval History   Nursing notes reviewed. BRAXTON. Feeling well this AM. Ready for OR. No chest pain, SOB, abd pain, leg pain.     Data reviewed today: I reviewed all medications, new labs and imaging results over the last 24 hours. I personally reviewed the chest x-ray image(s) showing stable IABP     Physical Exam   Vital Signs: Temp: 97.5  F (36.4  C) Temp src: Axillary BP: 107/74 Pulse: 81   Resp: 20 SpO2: 97 % O2 Device: None (Room air)    Weight: 138 lbs 14.24 oz  General Appearance: Male in NAD resting in bed, polite and conversational  Respiratory: ctab on RA, nonlabored  Cardiovascular: rrr, s1, s2, no murmur  GI: soft, nt/nd  Skin: warm, well perfused, IABP site c/d/i without swelling/hematoma  Neuro: alert, interactive, speech fluent, moving all 4     Data   Recent Labs   Lab 07/08/22  0340 07/08/22  0332 07/07/22  0356 07/06/22  0346   WBC  --  6.2 6.2 6.6   HGB  --  9.0* 9.6* 9.2*   MCV  --  92 92 92   PLT  --  386 385 429   INR  --  1.21* 1.13 1.12   NA  --  138 135* 134*   POTASSIUM  --  4.4 4.4 4.5   CHLORIDE  --  104  98 101   CO2  --  23 23 22   BUN  --  15.3 13.2 14.7   CR  --  0.66* 0.68 0.74   ANIONGAP  --  11 14 11   ELDA  --  9.8 9.3 9.5   GLC 92 96 95 87   ALBUMIN  --  3.5 3.5 3.4*   PROTTOTAL  --  7.2 7.0 7.0   BILITOTAL  --  0.3 0.3 0.3   ALKPHOS  --  127 125 125   ALT  --  39 41 39   AST  --  32 32 30     No results found for this or any previous visit (from the past 24 hour(s)).  Medications     aminocaproic acid (AMICAR) infusion 7.5gm/150mL ADULT       [Held by provider] EPINEPHrine       epoprostenol (VELETRI) 20 mcg/mL in sterile water inhalation solution       heparin Stopped (07/08/22 0626)     milrinone       norepinephrine       vasopressin         aminocaproic acid (AMICAR) bolus  7.5 g Intravenous Once     aspirin  81 mg Oral Daily     DULoxetine  30 mg Oral Daily     ferrous sulfate  325 mg Oral Daily     fluconazole  200 mg Intravenous Pre-Op/Pre-procedure x 1 dose     fluticasone-vilanterol  1 puff Inhalation Daily     hydroxychloroquine  200 mg Oral BID     levofloxacin  500 mg Intravenous Pre-Op/Pre-procedure x 1 dose     levofloxacin  500 mg Intravenous See Admin Instructions     melatonin  10 mg Oral At Bedtime     mupirocin   Both Nostrils BID     polyethylene glycol  17 g Oral Daily     rifampin  600 mg Intravenous Pre-Op/Pre-procedure x 1 dose     senna-docusate  2 tablet Oral BID     sodium chloride  2 spray Both Nostrils Q3H     [Held by provider] tamsulosin  0.4 mg Oral Daily     thiamine  100 mg Oral Daily     traZODone  100 mg Oral At Bedtime     vancomycin  1,000 mg Intravenous Pre-Op/Pre-procedure x 1 dose     vancomycin  1,000 mg Intravenous See Admin Instructions      1 = Total assistance

## 2023-12-06 ENCOUNTER — TELEPHONE (OUTPATIENT)
Dept: ANTICOAGULATION | Facility: CLINIC | Age: 63
End: 2023-12-06

## 2023-12-06 NOTE — TELEPHONE ENCOUNTER
ANTICOAGULATION     Dandy Sands is overdue for an INR check.     Spoke with Guy and he will have his INR checked tomorrow when he had his PICC line dressing change done.     Holli Silva RN

## 2023-12-07 ENCOUNTER — ANTICOAGULATION THERAPY VISIT (OUTPATIENT)
Dept: ANTICOAGULATION | Facility: CLINIC | Age: 63
End: 2023-12-07

## 2023-12-07 DIAGNOSIS — I50.20 HEART FAILURE WITH REDUCED EJECTION FRACTION, NYHA CLASS III (H): ICD-10-CM

## 2023-12-07 DIAGNOSIS — Z95.811 LEFT VENTRICULAR ASSIST DEVICE PRESENT (H): ICD-10-CM

## 2023-12-07 DIAGNOSIS — I50.22 CHRONIC SYSTOLIC CONGESTIVE HEART FAILURE (H): Primary | ICD-10-CM

## 2023-12-07 DIAGNOSIS — Z95.811 LVAD (LEFT VENTRICULAR ASSIST DEVICE) PRESENT (H): ICD-10-CM

## 2023-12-07 LAB — INR (EXTERNAL): 2.2 (ref 0.9–1.1)

## 2023-12-07 NOTE — PROGRESS NOTES
"ANTICOAGULATION MANAGEMENT     Dandy Sands 63 year old male is on warfarin with therapeutic INR result. (Goal INR 2.0-3.0)    Recent labs: (last 7 days)     12/07/23  0000   INR 2.2*       ASSESSMENT     Source(s): Chart Review and Patient/Caregiver Call     Warfarin doses taken: Warfarin taken as instructed  Diet: No new diet changes identified  Medication/supplement changes:  continues on bactrim for drive line infection-Dandy states it is \"working really well\"   He started bactrim 11/15/23  New illness, injury, or hospitalization: No  Signs or symptoms of bleeding or clotting: No  Previous result: Therapeutic last 2(+) visits  Additional findings: None       PLAN     Recommended plan for no diet, medication or health factor changes affecting INR     Dosing Instructions: Continue your current warfarin dose with next INR in 1 week       Summary  As of 12/7/2023      Full warfarin instructions:  2.5 mg every Sun, Wed; 5 mg all other days   Next INR check:  12/14/2023               Telephone call with Dandy who verbalizes understanding and agrees to plan    Check at provider office visit    Education provided:   Contact [phone] with any changes, questions or concerns.     Plan made per [facility] anticoagulation protocol and per LVAD protocol    Leatha Abbott RN  Anticoagulation Clinic  12/7/2023    _______________________________________________________________________     Anticoagulation Episode Summary       Current INR goal:  2.0-3.0   TTR:  54.7% (11.5 mo)   Target end date:  Indefinite   Send INR reminders to:  ANTICOAG LVAD    Indications    Chronic systolic congestive heart failure (H) [I50.22]  LVAD (left ventricular assist device) present (H) [Z95.811]  Heart failure with reduced ejection fraction  NYHA class III (H) [I50.20]  Left ventricular assist device present (H) [Z95.811]             Comments:  Follow VAD Anticoag protocol:Yes: HeartMate 3   Bridging: No bridging epistaxis.   Date VAD placed: " 7/8/22             Anticoagulation Care Providers       Provider Role Specialty Phone number    Kelly Lora MD Referring Cardiovascular Disease [phone]

## 2023-12-11 DIAGNOSIS — I50.22 CHRONIC SYSTOLIC CONGESTIVE HEART FAILURE (H): ICD-10-CM

## 2023-12-11 DIAGNOSIS — Z95.811 LEFT VENTRICULAR ASSIST DEVICE PRESENT (H): ICD-10-CM

## 2023-12-11 DIAGNOSIS — Z79.899 LONG TERM USE OF DRUG: Primary | ICD-10-CM

## 2023-12-13 NOTE — PROGRESS NOTES
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 Zamora). Patient has previously been treated with daptomycin (10/2022, 12/2022) and more recently has been on minocycline for suppression of Corynebacterium driveline infection. Admitted to OSH 10/2023 for N/V possibly due to colitis vs Covid booster reaction. 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 He presents to VAD clinic for routine follow up.      Last seen by VAD clinic in 8/2023. At that time he was feeling well. Medically stable, though did have brief discussion regarding transplant. Dandy shared that he thought that transplant may not be the best option for him given elevated PSAs and concerns for long term immunosuppression. Has continued to struggle with driveline drainage and infection and has followed closely with [facility] and [facility] teams. Remains on IV dablbavancin every 2 weeks for Corynebecterium. 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 He is on Bactrim DS for suppressive therapy. Bactrim recently reduced from BID to daily due to worsening renal function.     Today, Mr. Sands presents to clinic with his son. He reports feeling okay. He has had increased episodes of dizziness.  Yesterday he had a 4 hour episode of dizziness that was also associated with seeing black spots. Felt dizzy with standing, but also while lying down. No syncope or recent falls. Son reports that his dad  does not drink enough water and is concerned that he is eating too much salt. Denies chest pain/pressure, CASEY, SOB, palpitations, orthopnea, PND, LE edema, abdominal distension. Denies fever, chills, cough, sore throat, nausea, vomiting. Occasional constipation. Denies melena, hematochezia, hematuria. Denies headaches. No stroke symptoms.     No LVAD drainage or bleeding. No LVAD alarms.      Cardiac Medications:  - Warfarin  - ASA 81 mg daily   - Lisinopril 15 mg in the morning, 10 mg in the evening  - Digoxin 125 mcg every day  - Atorvastatin 40 mg nightly      PAST MEDICAL HISTORY:  Past Medical History:   Diagnosis Date    Antiphospholipid antibody syndrome (H)     CAD (coronary artery disease)     Chronic systolic heart failure (H)     Ischemic cardiomyopathy     Mitral regurgitation     Systemic lupus erythematosus (H)        FAMILY HISTORY:  No family history on file.    SOCIAL HISTORY:  Social History     Socioeconomic History    Marital status: Single   Tobacco Use    Smoking status: Former    Smokeless tobacco: Never       CURRENT MEDICATIONS:  acetaminophen (TYLENOL) 325 MG tablet, Take 3 tablets (975 mg) by mouth every 8 hours as needed for mild pain (Patient taking differently: Take 975 mg by mouth every 8 hours as needed for mild pain prn)  amoxicillin (AMOXIL) 500 MG capsule, Take 4 capsules (2,000 mg) by mouth as needed (take 1 hour prior to any dental procedures)  aspirin (ASA) 81 MG EC tablet, Take 1 tablet (81 mg) by mouth daily  atorvastatin (LIPITOR) 40 MG tablet, Take 1 tablet (40 mg) by mouth daily  dalbavancin (DALVANCE) 20 mg/mL, Inject into the vein every 14 days  digoxin (LANOXIN) 125 MCG tablet, Take 1 tablet (125 mcg) by mouth daily  ferrous sulfate (FEROSUL) 325 (65 Fe) MG tablet, Take 1 tablet (325 mg) by mouth every other day  gabapentin (NEURONTIN) 100 MG capsule, TAKE 1 Capsule BY MOUTH EVERY MORNING, NOON & BEDTIME  hydroxychloroquine (PLAQUENIL) 200 MG tablet, Take 1 tablet (200  mg) by mouth 2 times daily  lidocaine (LIDODERM) 5 % patch, Place 1 patch onto the skin every 24 hours To prevent lidocaine toxicity, patient should be patch free for 12 hrs daily.  lisinopril (ZESTRIL) 10 MG tablet, Take 1.5 tabs in the AM and 1 tab in the evening  lisinopril (ZESTRIL) 10 MG tablet, Take 15mg in the morning and 10mg in the evening.  loperamide (IMODIUM) 2 MG capsule, Take 1 capsule (2 mg) by mouth 2 times daily as needed for diarrhea  tamsulosin (FLOMAX) 0.4 MG capsule, Take 1 capsule (0.4 mg) by mouth daily  warfarin ANTICOAGULANT (COUMADIN) 2 MG tablet, Take 4 mg PO daily at 6 pm until next INR check, then dose per INR goal 2-3.  warfarin ANTICOAGULANT (COUMADIN) 2.5 MG tablet, Take 2.5 to 3 tablets daily or as directed by the Coumadin clinic.  [DISCONTINUED] clopidogrel (PLAVIX) 75 MG tablet, Take 1 tablet (75 mg) by mouth daily  [DISCONTINUED] DULoxetine (CYMBALTA) 30 MG capsule, Take 1 capsule (30 mg) by mouth daily  [DISCONTINUED] mycophenolate (GENERIC EQUIVALENT) 500 MG tablet, Take 3 tablets (1,500 mg) by mouth 2 times daily for 30 days  [DISCONTINUED] ticagrelor (BRILINTA) 90 MG tablet, Take 1 tablet (90 mg) by mouth 2 times daily    No current facility-administered medications on file prior to visit.      ROS:   Refer to HPI    EXAM:  BP (!) 56/0    GENERAL: Appears comfortable, in no acute distress.   HEENT: Eye symmetrical, no discharge or icterus bilaterally. Mucous membranes moist and without lesions.  CV: RRR, + mechanical LVAD hum, JVD at clavicle sitting upright.   RESPIRATORY: Respirations regular, even, and unlabored. Lungs CTA throughout.   GI: Soft and non distended with normoactive bowel sounds present in all quadrants. No tenderness, rebound, guarding. No hepatomegaly.   EXTREMITIES: no peripheral edema. 2+ bilateral pedal pulses.   NEUROLOGIC: Alert and oriented x 3. No focal deficits.   MUSCULOSKELETAL: No joint swelling or tenderness.   SKIN: No jaundice. No rashes or  "lesions.     Labs, reviewed with patient in clinic today:  CBC RESULTS:  Lab Results   Component Value Date    WBC 5.0 08/24/2023    RBC 3.10 (L) 08/24/2023    HGB 8.6 (L) 08/24/2023    HCT 27.2 (L) 08/24/2023    MCV 88 08/24/2023    MCH 27.7 08/24/2023    MCHC 31.6 08/24/2023    RDW 18.0 (H) 08/24/2023     08/24/2023       CMP RESULTS:  Lab Results   Component Value Date     08/24/2023    POTASSIUM 3.6 08/24/2023    POTASSIUM 4.3 10/27/2022    POTASSIUM 3.9 09/01/2022    POTASSIUM 5.2 07/09/2022    CHLORIDE 109 08/31/2023    CO2 21 (L) 08/24/2023    CO2 22 09/01/2022    ANIONGAP 12 08/24/2023    ANIONGAP 6 09/01/2022    GLC 98 08/24/2023    GLC 82 03/17/2023    GLC 94 09/01/2022    BUN 22.3 08/24/2023    BUN 13 09/01/2022    CR 1.12 08/24/2023    GFRESTIMATED 75 08/24/2023    ELDA 9.3 08/24/2023    BILITOTAL 0.4 08/24/2023    ALBUMIN 4.2 08/24/2023    ALBUMIN 3.1 (L) 09/01/2022    ALKPHOS 205 (H) 08/24/2023    ALT 22 08/24/2023    AST 22 08/24/2023        INR RESULTS:  Lab Results   Component Value Date    INR 2.2 (A) 12/07/2023    INR 2.7 (H) 08/31/2023    INR 2.7 (H) 08/31/2023       Lab Results   Component Value Date    MAG 1.7 11/02/2022     Lab Results   Component Value Date    NTBNPI 5,061 (H) 10/14/2022     No results found for: \"NTBNP\"    Cardiac Diagnostics:    12/12/22 CT A/P w/ contrast   IMPRESSION:   1. Very slight decrease in anterior mediastinal fluid collection of  unknown clinical significance.  No associated gas or significant  inflammation. Stable soft tissue thickening about the LVAD drive line.     2. Slight decrease probably mediastinal lymph nodes. Stable prominent  retroperitoneal and bilateral inguinal/external iliac chain lymph  nodes, likely reactive.  3. Stable trace pericardial effusion.  Chronic changes in the lungs as  above.  4. Hepatomegaly with heterogenous appearance, likely secondary to  congestive hepatopathy.  5. Small volume pelvic free fluid.  6. " Cholelithiasis.    11/11/2022 CT/PET ([facility])  IMPRESSION:   1.  Increased FDG uptake along the subcutaneous course of the LVAD driveline suspicious for infection.   2.  Less intense FDG uptake along the LVAD outflow cannula is more likely to be due to aseptic reactive accumulation rather than infection.   3.  Moderately intense uptake along the midline sternotomy is likely due to ongoing osseous healing.   4.  Mildly hypermetabolic mediastinal and bilateral axillary lymph nodes are most likely inflammatory/reactive.   5.  Additional findings are similar to the comparison CT examinations.      11/6/2022 ECHO    Left Ventricle: The left ventricle appears normal in size. There is   mild left ventricular hypertrophy. Severely reduced left ventricular   systolic function. The EF is visually estimated to be 15-20%. HeartMate   III LVAD cannula visualized. The aortic valve opens intermittently during   the visualized cardiac cycles. The LVAD inflow cannula is well seated at   the apex.     Right Ventricle: The right ventricle is within the upper limits of   normal in size. Right ventricle was not well visualized. Systolic function   is mildly reduced.     IVC/SVC: Normal IVC size with minimal respirophasic changes.     Aortic Valve: There is mild regurgitation.      10/27/22 Device Interrogation   Device: [employer] [id number] Evera XT   Normal device function   Mode: AAI/DDD /115 bpm  AP: 0.1%  : 0.2%  Intrinsic rhythm: SR w/ PVCs @ 96 bpm  Thoracic Impedance: Below reference line, suggesting possible intrathoracic fluid accumulation.  Short V-V intervals: 0  Lead Trends: Acute change in RV capture threshold noted since VAD implant in July '22 and continues to remain high, measuring today at 2.75 V @ 1.0 ms. R waves today measuring at 0.9 mV. RV lead impedance appears stable at this time.  Estimated battery longevity to RRT = 4.5 years. Battery voltage = 2.96 V.   Atrial Arrhythmia: 2 AT/AF episodes lasting  "19 hr 55 min - 36 hr 11 min, most recently occurring on 10/16/22. Stored EGMs show AT/AF with controlled vent response (80-90s bpm).   AF [place]: 11.9% (since 10/7/22)  Anticoagulant: warfarin  Ventricular Arrhythmia: 1 VT-NS lasting 5 sec @ 186 bpm.   Setting Changes: RV Capture Management programmed from \"Adaptive\" to OFF in setting of increased RV amplitude, with current programmed RV Amplitude: 5V @ 1.0 ms.   Patient has an appointment to see Dr. Lora today.   Plan: Send a remote transmission in 3 months (upon transfer of home monitor from previous device clinic). Follow-up with next available EP to address increase in RV amplitude following insertion of VAD.    KHANG Rodriguez RN    9/23/2022 ECHO  Interpretation Summary  HM3 LVAD is present but not well seen on today's study.     Left ventricular chamber size is small, LVIDd = 3.9 cm  Left ventricular function is decreased. The ejection fraction is 15-20%  (severely reduced).  Global right ventricular function is normal.  The aortic valve remains closed during the cardiac cycle. There is mild  continuous aortic regurgitation.  No pericardial effusion is present.     LV cavity size is smaller with cavity obliteration in standing position ,  LVIDd = 3.0 cm        Assessment and Plan:   Mr. Sands is a 63 year old male with medical history pertinent for SLE, antiphospholipid syndrome, pulmonary embolism (on warfarin), ICM (EF 10-15%) s/p HM3 LVAD (7/28/22), and C. Diff. He presents to VAD clinic for routine follow up.     Appears dry and hypotensive in clinic with MAP 56. He is not on diuretics and speed is already at low setting of 5100. Flows have been lower in the 2s. We had planned to reduce lisinopril to 5 mg BID and increase PO intake. Review of today's labs notable for worsening JOSE with Cr 1.5, up from 1.2 on 12/12 and from baseline 0.8. Immediately following out visit, Dandy had a syncopal event in the clinic lobby. He was sent to the ED for further " evaluation. Instructed LVAD coordinator to stop lisinopril at least through the weekend and then we will re-evaluate symptoms and doppler MAP next Monday. I spoke with Dr. Oconnell in the ED and recommended that Dandy receive 1 L NS for hypovolemia and JOSE.  LVAD coordinator will follow up by phone on Monday for doppler MAPs and to determine plan for lisinopril. We will also repeat BMP in 1 week to follow JOSE.       # Chronic systolic heart failure secondary to ICM   # s/p HM3 LVAD on 7/8/12  c/b RV failure with temporary RVAD support with Protek duo (removed 7/21) and post chest closure hemopericardium s/p repeat washout on 7/12, epistaixis requiring reintubation for airway protection.  Stage D. NYHA Class III     Fluid status hypovolemic;  off diuretics, Rx for lasix 20 mg daily PRN  ACEi/ARB: HOLD lisinopril 15/10 mg BID for hypotension  BB Deferred give recent LVAD implant with RV failure requiring protek   Aldosterone antagonist deferred while other medical therapy is prioritized and given recent hypovolemia  SGLT2i- defered given patient immunosuppression and unclear evidence in LVAD patients  RV support digoxin 125 mcg daily   SCD prophylaxis ICD  MAP: Goal MAP 65-85, current MAP 56  LDH trends: 165, stable  Anticoagulation: warfarin INR goal 2-3, today 1.73  Antiplatelet: ASA 81 mg daily  Transplant considerations: has very elevated PRAs, ongoing discussions with Primary cardiologist    VAD interrogation 12/14/23: VAD interrogation reviewed with VAD coordinator. Agree with findings. Frequent PI events, no power spikes, speed drops, or other findings suspicious of pump malfunction noted. PIs consistently higher 8-9s with lower flows with in the 2s.     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 Patient has previously been treated with daptomycin (10/2022, 12/2022) and minocycline for suppression of Corynebacterium  driveline infection. Currently being treated with IV dablbavancin every 2 weeks for Corynebecterium. 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 He remains on Bactrim DS for suppressive therapy.  - He follows with ID at [place] and intermittently with ID here. Evaluated by Dr. Charlton today.     #CAD s/p PCI to LAD ('05)  - continue ASA and atorvastatin     # SLE  # antiphospholipid syndrome  - He is on CellCept  - Anticoagulation as above  - Consider his antiphospholipid syndrome when making bridging decisions     # Elevated alk phos. Up to 206. Other LFTs are normal  - Recheck in 2 weeks     # Chronic anemia, likely in the setting of chronic disease and infection  - Iron 42, TSAT 16    Follow up:   - [facility] 2/6/24  - Dr. Lora 6 months              Shraddha Menjivar DNP, NP-C  Advance Heart Failure  12/14/2023

## 2023-12-14 ENCOUNTER — HOSPITAL ENCOUNTER (EMERGENCY)
Facility: CLINIC | Age: 63
Discharge: LEFT BEFORE DISCHARGE | End: 2023-12-14
Attending: EMERGENCY MEDICINE | Admitting: EMERGENCY MEDICINE
Payer: COMMERCIAL

## 2023-12-14 ENCOUNTER — OFFICE VISIT (OUTPATIENT)
Dept: CARDIOLOGY | Facility: CLINIC | Age: 63
End: 2023-12-14
Attending: INTERNAL MEDICINE
Payer: COMMERCIAL

## 2023-12-14 ENCOUNTER — OFFICE VISIT (OUTPATIENT)
Dept: INFECTIOUS DISEASES | Facility: CLINIC | Age: 63
End: 2023-12-14
Attending: PEDIATRICS
Payer: COMMERCIAL

## 2023-12-14 ENCOUNTER — CARE COORDINATION (OUTPATIENT)
Dept: CARDIOLOGY | Facility: CLINIC | Age: 63
End: 2023-12-14

## 2023-12-14 ENCOUNTER — LAB (OUTPATIENT)
Dept: LAB | Facility: CLINIC | Age: 63
End: 2023-12-14
Attending: INTERNAL MEDICINE

## 2023-12-14 ENCOUNTER — DOCUMENTATION ONLY (OUTPATIENT)
Dept: CARDIOLOGY | Facility: CLINIC | Age: 63
End: 2023-12-14

## 2023-12-14 ENCOUNTER — ANTICOAGULATION THERAPY VISIT (OUTPATIENT)
Dept: ANTICOAGULATION | Facility: CLINIC | Age: 63
End: 2023-12-14

## 2023-12-14 VITALS — TEMPERATURE: 97.5 F | OXYGEN SATURATION: 100 %

## 2023-12-14 VITALS
RESPIRATION RATE: 18 BRPM | WEIGHT: 160 LBS | HEIGHT: 69 IN | TEMPERATURE: 98.4 F | BODY MASS INDEX: 23.7 KG/M2 | HEART RATE: 62 BPM | OXYGEN SATURATION: 93 %

## 2023-12-14 VITALS — SYSTOLIC BLOOD PRESSURE: 56 MMHG

## 2023-12-14 DIAGNOSIS — T82.7XXA INFECTION ASSOCIATED WITH DRIVELINE OF LEFT VENTRICULAR ASSIST DEVICE (LVAD) (H): ICD-10-CM

## 2023-12-14 DIAGNOSIS — Z79.899 LONG TERM USE OF DRUG: ICD-10-CM

## 2023-12-14 DIAGNOSIS — Z95.811 LEFT VENTRICULAR ASSIST DEVICE PRESENT (H): ICD-10-CM

## 2023-12-14 DIAGNOSIS — I50.20 HEART FAILURE WITH REDUCED EJECTION FRACTION, NYHA CLASS III (H): ICD-10-CM

## 2023-12-14 DIAGNOSIS — Z95.811 LVAD (LEFT VENTRICULAR ASSIST DEVICE) PRESENT (H): ICD-10-CM

## 2023-12-14 DIAGNOSIS — R42 LIGHT HEADEDNESS: Primary | ICD-10-CM

## 2023-12-14 DIAGNOSIS — D50.8 OTHER IRON DEFICIENCY ANEMIA: ICD-10-CM

## 2023-12-14 DIAGNOSIS — T82.7XXA INFECTION ASSOCIATED WITH DRIVELINE OF LEFT VENTRICULAR ASSIST DEVICE (LVAD) (H): Primary | ICD-10-CM

## 2023-12-14 DIAGNOSIS — I50.22 CHRONIC SYSTOLIC CONGESTIVE HEART FAILURE (H): ICD-10-CM

## 2023-12-14 DIAGNOSIS — I50.22 CHRONIC SYSTOLIC CONGESTIVE HEART FAILURE (H): Primary | ICD-10-CM

## 2023-12-14 DIAGNOSIS — R55 SYNCOPE, UNSPECIFIED SYNCOPE TYPE: ICD-10-CM

## 2023-12-14 LAB
ALBUMIN SERPL BCG-MCNC: 4.2 G/DL (ref 3.5–5.2)
ALP SERPL-CCNC: 206 U/L (ref 40–150)
ALT SERPL W P-5'-P-CCNC: 33 U/L (ref 0–70)
ANION GAP SERPL CALCULATED.3IONS-SCNC: 13 MMOL/L (ref 7–15)
AST SERPL W P-5'-P-CCNC: 32 U/L (ref 0–45)
ATRIAL RATE - MUSE: 63 BPM
BASOPHILS # BLD AUTO: 0 10E3/UL (ref 0–0.2)
BASOPHILS NFR BLD AUTO: 1 %
BILIRUB SERPL-MCNC: 0.4 MG/DL
BUN SERPL-MCNC: 33.2 MG/DL (ref 8–23)
CALCIUM SERPL-MCNC: 9.2 MG/DL (ref 8.8–10.2)
CHLORIDE SERPL-SCNC: 105 MMOL/L (ref 98–107)
CREAT SERPL-MCNC: 1.53 MG/DL (ref 0.67–1.17)
DEPRECATED HCO3 PLAS-SCNC: 19 MMOL/L (ref 22–29)
DIASTOLIC BLOOD PRESSURE - MUSE: NORMAL MMHG
EGFRCR SERPLBLD CKD-EPI 2021: 51 ML/MIN/1.73M2
EOSINOPHIL # BLD AUTO: 0.2 10E3/UL (ref 0–0.7)
EOSINOPHIL NFR BLD AUTO: 5 %
ERYTHROCYTE [DISTWIDTH] IN BLOOD BY AUTOMATED COUNT: 17.9 % (ref 10–15)
FERRITIN SERPL-MCNC: 292 NG/ML (ref 31–409)
FOLATE SERPL-MCNC: 7.9 NG/ML (ref 4.6–34.8)
GLUCOSE SERPL-MCNC: 138 MG/DL (ref 70–99)
HCT VFR BLD AUTO: 29.4 % (ref 40–53)
HGB BLD-MCNC: 9.6 G/DL (ref 13.3–17.7)
HOLD SPECIMEN: NORMAL
HOLD SPECIMEN: NORMAL
IMM GRANULOCYTES # BLD: 0 10E3/UL
IMM GRANULOCYTES NFR BLD: 0 %
INR PPP: 1.73 (ref 0.85–1.15)
INTERPRETATION ECG - MUSE: NORMAL
IRON BINDING CAPACITY (ROCHE): 269 UG/DL (ref 240–430)
IRON SATN MFR SERPL: 16 % (ref 15–46)
IRON SERPL-MCNC: 42 UG/DL (ref 61–157)
LDH SERPL L TO P-CCNC: 165 U/L (ref 0–250)
LDH SERPL L TO P-CCNC: 177 U/L (ref 0–250)
LYMPHOCYTES # BLD AUTO: 0.7 10E3/UL (ref 0.8–5.3)
LYMPHOCYTES NFR BLD AUTO: 22 %
MCH RBC QN AUTO: 26.4 PG (ref 26.5–33)
MCHC RBC AUTO-ENTMCNC: 32.7 G/DL (ref 31.5–36.5)
MCV RBC AUTO: 81 FL (ref 78–100)
MONOCYTES # BLD AUTO: 0.5 10E3/UL (ref 0–1.3)
MONOCYTES NFR BLD AUTO: 15 %
NEUTROPHILS # BLD AUTO: 1.8 10E3/UL (ref 1.6–8.3)
NEUTROPHILS NFR BLD AUTO: 57 %
NRBC # BLD AUTO: 0 10E3/UL
NRBC BLD AUTO-RTO: 0 /100
NT-PROBNP SERPL-MCNC: 827 PG/ML (ref 0–900)
P AXIS - MUSE: NORMAL DEGREES
PLATELET # BLD AUTO: 177 10E3/UL (ref 150–450)
POTASSIUM SERPL-SCNC: 4.3 MMOL/L (ref 3.4–5.3)
PR INTERVAL - MUSE: NORMAL MS
PROT SERPL-MCNC: 7.5 G/DL (ref 6.4–8.3)
QRS DURATION - MUSE: 160 MS
QT - MUSE: 446 MS
QTC - MUSE: 524 MS
R AXIS - MUSE: -17 DEGREES
RBC # BLD AUTO: 3.64 10E6/UL (ref 4.4–5.9)
SODIUM SERPL-SCNC: 137 MMOL/L (ref 135–145)
SYSTOLIC BLOOD PRESSURE - MUSE: NORMAL MMHG
T AXIS - MUSE: 88 DEGREES
TRANSFERRIN SERPL-MCNC: 216 MG/DL (ref 200–360)
TROPONIN T SERPL HS-MCNC: 14 NG/L
VENTRICULAR RATE- MUSE: 83 BPM
VIT B12 SERPL-MCNC: 422 PG/ML (ref 232–1245)
WBC # BLD AUTO: 3.2 10E3/UL (ref 4–11)

## 2023-12-14 PROCEDURE — 99214 OFFICE O/P EST MOD 30 MIN: CPT | Performed by: INTERNAL MEDICINE

## 2023-12-14 PROCEDURE — 36415 COLL VENOUS BLD VENIPUNCTURE: CPT | Performed by: EMERGENCY MEDICINE

## 2023-12-14 PROCEDURE — 87070 CULTURE OTHR SPECIMN AEROBIC: CPT | Performed by: INTERNAL MEDICINE

## 2023-12-14 PROCEDURE — 96360 HYDRATION IV INFUSION INIT: CPT | Performed by: EMERGENCY MEDICINE

## 2023-12-14 PROCEDURE — 84075 ASSAY ALKALINE PHOSPHATASE: CPT | Mod: 90 | Performed by: PATHOLOGY

## 2023-12-14 PROCEDURE — 83550 IRON BINDING TEST: CPT | Performed by: PATHOLOGY

## 2023-12-14 PROCEDURE — 99284 EMERGENCY DEPT VISIT MOD MDM: CPT | Performed by: EMERGENCY MEDICINE

## 2023-12-14 PROCEDURE — 99214 OFFICE O/P EST MOD 30 MIN: CPT | Mod: 25 | Performed by: NURSE PRACTITIONER

## 2023-12-14 PROCEDURE — 84466 ASSAY OF TRANSFERRIN: CPT | Performed by: NURSE PRACTITIONER

## 2023-12-14 PROCEDURE — 36415 COLL VENOUS BLD VENIPUNCTURE: CPT | Mod: 90 | Performed by: PATHOLOGY

## 2023-12-14 PROCEDURE — 84238 ASSAY NONENDOCRINE RECEPTOR: CPT | Mod: 90 | Performed by: PATHOLOGY

## 2023-12-14 PROCEDURE — 84484 ASSAY OF TROPONIN QUANT: CPT | Performed by: EMERGENCY MEDICINE

## 2023-12-14 PROCEDURE — 85025 COMPLETE CBC W/AUTO DIFF WBC: CPT | Performed by: PATHOLOGY

## 2023-12-14 PROCEDURE — 82607 VITAMIN B-12: CPT | Performed by: NURSE PRACTITIONER

## 2023-12-14 PROCEDURE — 93005 ELECTROCARDIOGRAM TRACING: CPT | Performed by: EMERGENCY MEDICINE

## 2023-12-14 PROCEDURE — 80053 COMPREHEN METABOLIC PANEL: CPT | Performed by: PATHOLOGY

## 2023-12-14 PROCEDURE — 84080 ASSAY ALKALINE PHOSPHATASES: CPT | Mod: 90 | Performed by: PATHOLOGY

## 2023-12-14 PROCEDURE — 83615 LACTATE (LD) (LDH) ENZYME: CPT | Performed by: PATHOLOGY

## 2023-12-14 PROCEDURE — 85610 PROTHROMBIN TIME: CPT | Performed by: PATHOLOGY

## 2023-12-14 PROCEDURE — 99000 SPECIMEN HANDLING OFFICE-LAB: CPT | Performed by: PATHOLOGY

## 2023-12-14 PROCEDURE — 83880 ASSAY OF NATRIURETIC PEPTIDE: CPT | Performed by: PATHOLOGY

## 2023-12-14 PROCEDURE — 258N000003 HC RX IP 258 OP 636: Performed by: EMERGENCY MEDICINE

## 2023-12-14 PROCEDURE — 93750 INTERROGATION VAD IN PERSON: CPT | Performed by: NURSE PRACTITIONER

## 2023-12-14 PROCEDURE — 93010 ELECTROCARDIOGRAM REPORT: CPT | Performed by: EMERGENCY MEDICINE

## 2023-12-14 PROCEDURE — 87075 CULTR BACTERIA EXCEPT BLOOD: CPT | Performed by: INTERNAL MEDICINE

## 2023-12-14 PROCEDURE — 83540 ASSAY OF IRON: CPT | Performed by: PATHOLOGY

## 2023-12-14 PROCEDURE — 99211 OFF/OP EST MAY X REQ PHY/QHP: CPT | Mod: 25,27 | Performed by: INTERNAL MEDICINE

## 2023-12-14 PROCEDURE — 82746 ASSAY OF FOLIC ACID SERUM: CPT | Performed by: PHYSICIAN ASSISTANT

## 2023-12-14 PROCEDURE — 99285 EMERGENCY DEPT VISIT HI MDM: CPT | Mod: 25 | Performed by: EMERGENCY MEDICINE

## 2023-12-14 PROCEDURE — 99213 OFFICE O/P EST LOW 20 MIN: CPT | Mod: 25 | Performed by: NURSE PRACTITIONER

## 2023-12-14 PROCEDURE — 83615 LACTATE (LD) (LDH) ENZYME: CPT | Performed by: STUDENT IN AN ORGANIZED HEALTH CARE EDUCATION/TRAINING PROGRAM

## 2023-12-14 PROCEDURE — 82728 ASSAY OF FERRITIN: CPT | Performed by: PATHOLOGY

## 2023-12-14 RX ORDER — LISINOPRIL 5 MG/1
5 TABLET ORAL 2 TIMES DAILY
Qty: 60 TABLET | Refills: 3 | Status: ON HOLD | OUTPATIENT
Start: 2023-12-14 | End: 2024-06-13

## 2023-12-14 RX ORDER — MAGNESIUM HYDROXIDE/ALUMINUM HYDROXICE/SIMETHICONE 120; 1200; 1200 MG/30ML; MG/30ML; MG/30ML
30 SUSPENSION ORAL EVERY 4 HOURS PRN
Status: DISCONTINUED | OUTPATIENT
Start: 2023-12-14 | End: 2023-12-14 | Stop reason: HOSPADM

## 2023-12-14 RX ORDER — ONDANSETRON 2 MG/ML
4 INJECTION INTRAMUSCULAR; INTRAVENOUS EVERY 6 HOURS PRN
Status: DISCONTINUED | OUTPATIENT
Start: 2023-12-14 | End: 2023-12-14 | Stop reason: HOSPADM

## 2023-12-14 RX ORDER — LIDOCAINE 40 MG/G
CREAM TOPICAL
Status: DISCONTINUED | OUTPATIENT
Start: 2023-12-14 | End: 2023-12-14 | Stop reason: HOSPADM

## 2023-12-14 RX ORDER — BISACODYL 5 MG
10 TABLET, DELAYED RELEASE (ENTERIC COATED) ORAL DAILY PRN
Status: DISCONTINUED | OUTPATIENT
Start: 2023-12-14 | End: 2023-12-14 | Stop reason: HOSPADM

## 2023-12-14 RX ORDER — BISACODYL 5 MG
5 TABLET, DELAYED RELEASE (ENTERIC COATED) ORAL DAILY PRN
Status: DISCONTINUED | OUTPATIENT
Start: 2023-12-14 | End: 2023-12-14 | Stop reason: HOSPADM

## 2023-12-14 RX ORDER — CALCIUM CARBONATE 500 MG/1
1000 TABLET, CHEWABLE ORAL 4 TIMES DAILY PRN
Status: DISCONTINUED | OUTPATIENT
Start: 2023-12-14 | End: 2023-12-14 | Stop reason: HOSPADM

## 2023-12-14 RX ORDER — ONDANSETRON 4 MG/1
4 TABLET, ORALLY DISINTEGRATING ORAL EVERY 6 HOURS PRN
Status: DISCONTINUED | OUTPATIENT
Start: 2023-12-14 | End: 2023-12-14 | Stop reason: HOSPADM

## 2023-12-14 RX ORDER — ACETAMINOPHEN 325 MG/1
650 TABLET ORAL EVERY 4 HOURS PRN
Status: DISCONTINUED | OUTPATIENT
Start: 2023-12-14 | End: 2023-12-14 | Stop reason: HOSPADM

## 2023-12-14 RX ORDER — BISACODYL 5 MG
15 TABLET, DELAYED RELEASE (ENTERIC COATED) ORAL DAILY PRN
Status: DISCONTINUED | OUTPATIENT
Start: 2023-12-14 | End: 2023-12-14 | Stop reason: HOSPADM

## 2023-12-14 RX ORDER — ACETAMINOPHEN 650 MG/1
650 SUPPOSITORY RECTAL EVERY 4 HOURS PRN
Status: DISCONTINUED | OUTPATIENT
Start: 2023-12-14 | End: 2023-12-14 | Stop reason: HOSPADM

## 2023-12-14 RX ORDER — OMEPRAZOLE 40 MG/1
40 CAPSULE, DELAYED RELEASE ORAL 2 TIMES DAILY
COMMUNITY

## 2023-12-14 RX ADMIN — SODIUM CHLORIDE 1000 ML: 9 INJECTION, SOLUTION INTRAVENOUS at 12:51

## 2023-12-14 ASSESSMENT — PAIN SCALES - GENERAL: PAINLEVEL: NO PAIN (0)

## 2023-12-14 ASSESSMENT — ACTIVITIES OF DAILY LIVING (ADL)
ADLS_ACUITY_SCORE: 39
ADLS_ACUITY_SCORE: 39

## 2023-12-14 NOTE — NURSING NOTE
Initial vital signs for pt upon arrival included MAP of 58. Pt c/o intermittent dizziness, notes that he has not been drinking enough fluid. Fluid status further supported by abnormal VAD parameters, including low flow of 2.2 and high PI's. Shraddha Menjivar NP in room during initial assessment. After provider had left the room, pt experienced a pre-syncopal event while seated and speaking with this writer. MAP checked again, value of 44. Recheck MAP was 42. Pt's flows dropped into high 1's, pt endorses continued lightheadeness after reorienting. Called 911 and rapid response here at [facility]; pt transferred to [place] ED for further evaluation. ED charge RN contacted regarding transfer, and Shraddha Menjivar NP informed of event.     Informed pt's son that Shraddha would like to hold pt's Lisinopril until Monday, and should monitor MAP's daily. Pt and son will plan to report a MAP of 65 or less, abnormal VAD parameters and/or alarms or if pt develops new or worsening symptoms of hypotension to the VAD coordinator on call over the weekend. This writer will follow up with pt on Monday.      MCS VAD Pump Info       Row Name 12/14/23 1100             MCS VAD Information    Implant LVAD      LVAD Pump HeartMate 3         Heartmate 3 LEFT VS    Flow (Lpm) 2.9 Lpm      Pulse Index (PI) 10.5 PI      Speed (rpm) 5100 rpm      Power (persaud) 3.5 persaud      Current Hct setting 29      Retired: Unexpected Alarms --         Primary Controller    Serial number [id number]      Low flow alarm setting 2.0      High watt alarm setting --      EBB: Patient use 6      Replace in 15 Months         Backup Controller    Serial number [id number]      EBB: Patient use 8      Replace EBB in 15 Months      Speed & HCT match primary controller --  N/A         VAD Interrogation    Alarms reported by patient N      Unexpected alarms noted upon interrogation None      PI events Frequent  Hx goes back 6 hours, PI range 2.8-10.2. Please see nursing note in visit  for provider notification of abnormal VAD parameters w/associated symptoms.      Damage to equipment is noted N      Action taken Reviewed proper equipment care and maintenance         Driveline Exit Site    Dressing change done N  Done immediately prior to appt in ID clinic. Pt notes a substantial decrease in redness and drainage, changes dressing every other day      Driveline properly secured Yes      DLES assessment drainage      Dressing used Daily kit      Frequency patient changes dressing Every other day      Dressing modifications --      Dressing change supplier --

## 2023-12-14 NOTE — LETTER
12/14/2023      RE: Dandy Sands  [address]  [facility] [address]       Dear Colleague,    Thank you for the opportunity to participate in the care of your patient, Dandy Sands, at the [facility] HEART [facility] at [facility]. Please see a copy of my visit note below.      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 Zamora). Patient has previously been treated with daptomycin (10/2022, 12/2022) and more recently has been on minocycline for suppression of Corynebacterium driveline infection. Admitted to OSH 10/2023 for N/V possibly due to colitis vs Covid booster reaction. 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 He presents to VAD clinic for routine follow up.      Last seen by VAD clinic in 8/2023. At that time he was feeling well. Medically stable, though did have brief discussion regarding transplant. Dandy shared that he thought that transplant may not be the best option for him given elevated PSAs and concerns for long term immunosuppression. Has continued to struggle with driveline drainage and infection and has followed closely with [facility] and [facility] ID teams. Remains on IV dablbavancin every 2 weeks for Corynebecterium. 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 He is on Bactrim DS for suppressive therapy. Bactrim recently reduced from BID to daily due to worsening  renal function.     Today, Mr. Sands presents to clinic with his son. He reports feeling okay. He has had increased episodes of dizziness.  Yesterday he had a 4 hour episode of dizziness that was also associated with seeing black spots. Felt dizzy with standing, but also while lying down. No syncope or recent falls. Son reports that his dad does not drink enough water and is concerned that he is eating too much salt. Denies chest pain/pressure, CASEY, SOB, palpitations, orthopnea, PND, LE edema, abdominal distension. Denies fever, chills, cough, sore throat, nausea, vomiting. Occasional constipation. Denies melena, hematochezia, hematuria. Denies headaches. No stroke symptoms.     No LVAD drainage or bleeding. No LVAD alarms.      Cardiac Medications:  - Warfarin  - ASA 81 mg daily   - Lisinopril 15 mg in the morning, 10 mg in the evening  - Digoxin 125 mcg every day  - Atorvastatin 40 mg nightly      PAST MEDICAL HISTORY:  Past Medical History:   Diagnosis Date    Antiphospholipid antibody syndrome (H)     CAD (coronary artery disease)     Chronic systolic heart failure (H)     Ischemic cardiomyopathy     Mitral regurgitation     Systemic lupus erythematosus (H)        FAMILY HISTORY:  No family history on file.    SOCIAL HISTORY:  Social History     Socioeconomic History    Marital status: Single   Tobacco Use    Smoking status: Former    Smokeless tobacco: Never       CURRENT MEDICATIONS:  acetaminophen (TYLENOL) 325 MG tablet, Take 3 tablets (975 mg) by mouth every 8 hours as needed for mild pain (Patient taking differently: Take 975 mg by mouth every 8 hours as needed for mild pain prn)  amoxicillin (AMOXIL) 500 MG capsule, Take 4 capsules (2,000 mg) by mouth as needed (take 1 hour prior to any dental procedures)  aspirin (ASA) 81 MG EC tablet, Take 1 tablet (81 mg) by mouth daily  atorvastatin (LIPITOR) 40 MG tablet, Take 1 tablet (40 mg) by mouth daily  dalbavancin (DALVANCE) 20 mg/mL, Inject into the vein  every 14 days  digoxin (LANOXIN) 125 MCG tablet, Take 1 tablet (125 mcg) by mouth daily  ferrous sulfate (FEROSUL) 325 (65 Fe) MG tablet, Take 1 tablet (325 mg) by mouth every other day  gabapentin (NEURONTIN) 100 MG capsule, TAKE 1 Capsule BY MOUTH EVERY MORNING, NOON & BEDTIME  hydroxychloroquine (PLAQUENIL) 200 MG tablet, Take 1 tablet (200 mg) by mouth 2 times daily  lidocaine (LIDODERM) 5 % patch, Place 1 patch onto the skin every 24 hours To prevent lidocaine toxicity, patient should be patch free for 12 hrs daily.  lisinopril (ZESTRIL) 10 MG tablet, Take 1.5 tabs in the AM and 1 tab in the evening  lisinopril (ZESTRIL) 10 MG tablet, Take 15mg in the morning and 10mg in the evening.  loperamide (IMODIUM) 2 MG capsule, Take 1 capsule (2 mg) by mouth 2 times daily as needed for diarrhea  tamsulosin (FLOMAX) 0.4 MG capsule, Take 1 capsule (0.4 mg) by mouth daily  warfarin ANTICOAGULANT (COUMADIN) 2 MG tablet, Take 4 mg PO daily at 6 pm until next INR check, then dose per INR goal 2-3.  warfarin ANTICOAGULANT (COUMADIN) 2.5 MG tablet, Take 2.5 to 3 tablets daily or as directed by the Coumadin clinic.  [DISCONTINUED] clopidogrel (PLAVIX) 75 MG tablet, Take 1 tablet (75 mg) by mouth daily  [DISCONTINUED] DULoxetine (CYMBALTA) 30 MG capsule, Take 1 capsule (30 mg) by mouth daily  [DISCONTINUED] mycophenolate (GENERIC EQUIVALENT) 500 MG tablet, Take 3 tablets (1,500 mg) by mouth 2 times daily for 30 days  [DISCONTINUED] ticagrelor (BRILINTA) 90 MG tablet, Take 1 tablet (90 mg) by mouth 2 times daily    No current facility-administered medications on file prior to visit.      ROS:   Refer to HPI    EXAM:  BP (!) 56/0    GENERAL: Appears comfortable, in no acute distress.   HEENT: Eye symmetrical, no discharge or icterus bilaterally. Mucous membranes moist and without lesions.  CV: RRR, + mechanical LVAD hum, JVD at clavicle sitting upright.   RESPIRATORY: Respirations regular, even, and unlabored. Lungs CTA throughout.  "  GI: Soft and non distended with normoactive bowel sounds present in all quadrants. No tenderness, rebound, guarding. No hepatomegaly.   EXTREMITIES: no peripheral edema. 2+ bilateral pedal pulses.   NEUROLOGIC: Alert and oriented x 3. No focal deficits.   MUSCULOSKELETAL: No joint swelling or tenderness.   SKIN: No jaundice. No rashes or lesions.     Labs, reviewed with patient in clinic today:  CBC RESULTS:  Lab Results   Component Value Date    WBC 5.0 08/24/2023    RBC 3.10 (L) 08/24/2023    HGB 8.6 (L) 08/24/2023    HCT 27.2 (L) 08/24/2023    MCV 88 08/24/2023    MCH 27.7 08/24/2023    MCHC 31.6 08/24/2023    RDW 18.0 (H) 08/24/2023     08/24/2023       CMP RESULTS:  Lab Results   Component Value Date     08/24/2023    POTASSIUM 3.6 08/24/2023    POTASSIUM 4.3 10/27/2022    POTASSIUM 3.9 09/01/2022    POTASSIUM 5.2 07/09/2022    CHLORIDE 109 08/31/2023    CO2 21 (L) 08/24/2023    CO2 22 09/01/2022    ANIONGAP 12 08/24/2023    ANIONGAP 6 09/01/2022    GLC 98 08/24/2023    GLC 82 03/17/2023    GLC 94 09/01/2022    BUN 22.3 08/24/2023    BUN 13 09/01/2022    CR 1.12 08/24/2023    GFRESTIMATED 75 08/24/2023    ELDA 9.3 08/24/2023    BILITOTAL 0.4 08/24/2023    ALBUMIN 4.2 08/24/2023    ALBUMIN 3.1 (L) 09/01/2022    ALKPHOS 205 (H) 08/24/2023    ALT 22 08/24/2023    AST 22 08/24/2023        INR RESULTS:  Lab Results   Component Value Date    INR 2.2 (A) 12/07/2023    INR 2.7 (H) 08/31/2023    INR 2.7 (H) 08/31/2023       Lab Results   Component Value Date    MAG 1.7 11/02/2022     Lab Results   Component Value Date    NTBNPI 5,061 (H) 10/14/2022     No results found for: \"NTBNP\"    Cardiac Diagnostics:    12/12/22 CT A/P w/ contrast   IMPRESSION:   1. Very slight decrease in anterior mediastinal fluid collection of  unknown clinical significance.  No associated gas or significant  inflammation. Stable soft tissue thickening about the LVAD drive line.     2. Slight decrease probably mediastinal lymph " nodes. Stable prominent  retroperitoneal and bilateral inguinal/external iliac chain lymph  nodes, likely reactive.  3. Stable trace pericardial effusion.  Chronic changes in the lungs as  above.  4. Hepatomegaly with heterogenous appearance, likely secondary to  congestive hepatopathy.  5. Small volume pelvic free fluid.  6. Cholelithiasis.    11/11/2022 CT/PET ([facility])  IMPRESSION:   1.  Increased FDG uptake along the subcutaneous course of the LVAD driveline suspicious for infection.   2.  Less intense FDG uptake along the LVAD outflow cannula is more likely to be due to aseptic reactive accumulation rather than infection.   3.  Moderately intense uptake along the midline sternotomy is likely due to ongoing osseous healing.   4.  Mildly hypermetabolic mediastinal and bilateral axillary lymph nodes are most likely inflammatory/reactive.   5.  Additional findings are similar to the comparison CT examinations.      11/6/2022 ECHO    Left Ventricle: The left ventricle appears normal in size. There is   mild left ventricular hypertrophy. Severely reduced left ventricular   systolic function. The EF is visually estimated to be 15-20%. HeartMate   III LVAD cannula visualized. The aortic valve opens intermittently during   the visualized cardiac cycles. The LVAD inflow cannula is well seated at   the apex.     Right Ventricle: The right ventricle is within the upper limits of   normal in size. Right ventricle was not well visualized. Systolic function   is mildly reduced.     IVC/SVC: Normal IVC size with minimal respirophasic changes.     Aortic Valve: There is mild regurgitation.      10/27/22 Device Interrogation   Device: [employer]ium Softwaretronic [id number] Evera XT   Normal device function   Mode: AAI/DDD /115 bpm  AP: 0.1%  : 0.2%  Intrinsic rhythm: SR w/ PVCs @ 96 bpm  Thoracic Impedance: Below reference line, suggesting possible intrathoracic fluid accumulation.  Short V-V intervals: 0  Lead Trends: Acute change  "in RV capture threshold noted since VAD implant in July '22 and continues to remain high, measuring today at 2.75 V @ 1.0 ms. R waves today measuring at 0.9 mV. RV lead impedance appears stable at this time.  Estimated battery longevity to RRT = 4.5 years. Battery voltage = 2.96 V.   Atrial Arrhythmia: 2 AT/AF episodes lasting 19 hr 55 min - 36 hr 11 min, most recently occurring on 10/16/22. Stored EGMs show AT/AF with controlled vent response (80-90s bpm).   AF Kyle: 11.9% (since 10/7/22)  Anticoagulant: warfarin  Ventricular Arrhythmia: 1 VT-NS lasting 5 sec @ 186 bpm.   Setting Changes: RV Capture Management programmed from \"Adaptive\" to OFF in setting of increased RV amplitude, with current programmed RV Amplitude: 5V @ 1.0 ms.   Patient has an appointment to see Dr. Lora today.   Plan: Send a remote transmission in 3 months (upon transfer of home monitor from previous device clinic). Follow-up with next available EP to address increase in RV amplitude following insertion of VAD.    KHANG Rodriguez RN    9/23/2022 ECHO  Interpretation Summary  HM3 LVAD is present but not well seen on today's study.     Left ventricular chamber size is small, LVIDd = 3.9 cm  Left ventricular function is decreased. The ejection fraction is 15-20%  (severely reduced).  Global right ventricular function is normal.  The aortic valve remains closed during the cardiac cycle. There is mild  continuous aortic regurgitation.  No pericardial effusion is present.     LV cavity size is smaller with cavity obliteration in standing position ,  LVIDd = 3.0 cm        Assessment and Plan:   Mr. Sands is a 63 year old male with medical history pertinent for SLE, antiphospholipid syndrome, pulmonary embolism (on warfarin), ICM (EF 10-15%) s/p HM3 LVAD (7/28/22), and C. Diff. He presents to VAD clinic for routine follow up.     Appears dry and hypotensive in clinic with MAP 56. He is not on diuretics and speed is already at low setting of 5100. " Flows have been lower in the 2s. We had planned to reduce lisinopril to 5 mg BID and increase PO intake. Review of today's labs notable for worsening JOSE with Cr 1.5, up from 1.2 on 12/12 and from baseline 0.8. Immediately following out visit, Dandy had a syncopal event in the clinic lobby. He was sent to the ED for further evaluation. Instructed LVAD coordinator to stop lisinopril at least through the weekend and then we will re-evaluate symptoms and doppler MAP next Monday. I spoke with Dr. Oconnell in the ED and recommended that Dandy receive 1 L NS for hypovolemia and JOSE.  LVAD coordinator will follow up by phone on Monday for doppler MAPs and to determine plan for lisinopril. We will also repeat BMP in 1 week to follow JOSE.       # Chronic systolic heart failure secondary to ICM   # s/p HM3 LVAD on 7/8/12  c/b RV failure with temporary RVAD support with Protek duo (removed 7/21) and post chest closure hemopericardium s/p repeat washout on 7/12, epistaixis requiring reintubation for airway protection.  Stage D. NYHA Class III     Fluid status hypovolemic;  off diuretics, Rx for lasix 20 mg daily PRN  ACEi/ARB: HOLD lisinopril 15/10 mg BID for hypotension  BB Deferred give recent LVAD implant with RV failure requiring protek   Aldosterone antagonist deferred while other medical therapy is prioritized and given recent hypovolemia  SGLT2i- defered given patient immunosuppression and unclear evidence in LVAD patients  RV support digoxin 125 mcg daily   SCD prophylaxis ICD  MAP: Goal MAP 65-85, current MAP 56  LDH trends: 165, stable  Anticoagulation: warfarin INR goal 2-3, today 1.73  Antiplatelet: ASA 81 mg daily  Transplant considerations: has very elevated PRAs, ongoing discussions with Primary cardiologist    VAD interrogation 12/14/23: VAD interrogation reviewed with VAD coordinator. Agree with findings. Frequent PI events, no power spikes, speed drops, or other findings suspicious of pump malfunction noted. PIs  consistently higher 8-9s with lower flows with in the 2s.     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 Patient has previously been treated with daptomycin (10/2022, 12/2022) and minocycline for suppression of Corynebacterium driveline infection. Currently being treated with IV dablbavancin every 2 weeks for Corynebecterium. 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 He remains on Bactrim DS for suppressive therapy.  - He follows with ID at [place] and intermittently with ID here. Evaluated by Dr. Charlton today.     #CAD s/p PCI to LAD ('05)  - continue ASA and atorvastatin     # SLE  # antiphospholipid syndrome  - He is on CellCept  - Anticoagulation as above  - Consider his antiphospholipid syndrome when making bridging decisions     # Elevated alk phos. Up to 206. Other LFTs are normal  - Recheck in 2 weeks     # Chronic anemia, likely in the setting of chronic disease and infection  - Iron 42, TSAT 16    Follow up:   - [facility] Cardiology 2/6/24  - Dr. Lora 6 months              Shraddah Menjivar DNP, NP-C  Advance Heart Failure  12/14/2023

## 2023-12-14 NOTE — PROGRESS NOTES
Patient presented with dizziness he has had since he got the LVAD that has been getting worse x2 weeks. Seeing black spots. Lump on chest x 1 month.         Rupesh Gamble RN  Infectious Disease on 12/14/2023 at 9:10 AM

## 2023-12-14 NOTE — PLAN OF CARE
Pt came up to nursing desk and was stating that he was ready to go. Provider notified and writer and provider spoke with pt stating that Cardiology wants to come down and speak to pt. Pt stated that he was not going to wait that long that he was leaving. Writer convinced pt to wait 5 min to page cardiology. Pt agreed.     Page: Writer paged cards 2 resident stating that patient is wanting to leave and will be leaving in 5 min.       Waited the 5 min pt came back up to the desk and stated he is leaving. Writer stated the risks of leaving and that cardiology wanted to speak to him he says they can call me. Pt decided to leave. ER MD updated.

## 2023-12-14 NOTE — PROGRESS NOTES
Rapid Response Epic Documentation     Situation:      Cardiology, Rm 3.91 called 911 and RRT    Objective:      Pt being seen in clinic. Pt has LVAD. Pt told LVAD coordinator that he has been lightheaded since yesterday. Pt became lightheaded while sitting in clinic. Pt's MAP 42. Staff called 911 and RRT.    Assessment:          Found pt sitting up A&O. Pt did agree he had been feeling lightheaded since yesterday. Pt agreed to transport to ED for fluids. Pt stated he has a port and would prefer to wait until the ED for fluid replacement. Care transferred to [facility] ambulance crew.    BP:  MAP 42    Respiration: 16  Mental Status: Alert  CMS: Intact  Stroke Scale: Not Applicable  EKG: Not Performed      Treatment:        Location:        3rd Floor room 3.91  Disposition:      Transfer to Emergency Room Via: 911    Protocol Used:     Hypotension

## 2023-12-14 NOTE — ED TRIAGE NOTES
Pt presents from [place] for low MAP of 40-42.  Pt states he was told to present to ED for 2L of fluid to improve MAP.     Triage Assessment (Adult)       Row Name 12/14/23 7415          Triage Assessment    Airway WDL WDL        Respiratory WDL    Respiratory WDL WDL        Skin Circulation/Temperature WDL    Skin Circulation/Temperature WDL WDL        Cardiac WDL    Cardiac WDL X  LVAD        Peripheral/Neurovascular WDL    Peripheral Neurovascular WDL X        Cognitive/Neuro/Behavioral WDL    Cognitive/Neuro/Behavioral WDL WDL

## 2023-12-14 NOTE — PATIENT INSTRUCTIONS
Medications:  DECREASE Lisinopril to 5mg twice daily    Instructions:  Be sure to drink 64oz of water daily.   2. Take a doppler MAP on Saturday. If the value is less than 65, please page the VAD coordinator on call. Either way, Darline will follow up with you on Monday.     Follow-up: (make these appointments before you leave)  1. Please follow-up with Dr. Lora in June, 2024 with labs prior.        Page the VAD Coordinator on call if you gain more than 3 lb in a day or 5 in a week. Please also page if you feel unwell or have alarms.   Great to see you in clinic today. To Page the VAD Coordinator on call, dial [phone] option #4 and ask to speak to the VAD coordinator on call.

## 2023-12-14 NOTE — NURSING NOTE
Chief Complaint   Patient presents with    Follow Up     RETURN VAD        Medications were reconciled.    Kym Jain, EMT  10:32 AM

## 2023-12-14 NOTE — Clinical Note
12/14/2023       RE: Dandy Sands  [address]  [place] [address]     Dear Colleague,    Thank you for referring your patient, Dandy Sands, to the [facility] [place] [facility]. Please see a copy of my visit note below.    Patient presented with dizziness he has had since he got the LVAD that has been getting worse x2 weeks. Seeing black spots. Lump on chest x 1 month.         Rupesh Gamble RN  Infectious Disease on 12/14/2023 at 9:10 AM      [facility]  Clinic Follow-Up Visit  12/14/23    Chief Complaint:  LVAD infection    Assessment and Plan:  Dandy Sands is a 62 year old male with a history of SLE, antiphospholipid syndrome, CAD, CHF 2/2 ICM s/p LVAD 7/8/22, history of C.diff who presents today for management of chronic driveline infection. He is managed primarily by Dr. Steve Saravia with [facility]. He was in [place] today for cardiology appointment and so was asked to check in with ID here as well. 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 He remains on dalbavancin for the C striatum.     After he was seen in ID clinic, he became increasingly syncopal in cardiology clinic and was sent to the ED.     Plan:   Continue dalbavancin through [facility] team  Repeat culture today.     Continue to follow-up with [facility] team. If I can be of help coordinating phage therapy, etc., please let me know.     Farida Charlton MD  Infectious Diseases  Pager [phone]     HPI:  Dandy Sands is a 62 year old male with a history of SLE, antiphospholipid syndrome, CAD, CHF 2/2 ICM s/p LVAD 7/8/22, history of C.diff who presents today for management of chronic driveline infection.  Patient has had a complex history since his initial LVAD placement  in 7/2022.  He was hospitalized 6/17/22 - 8/21/22 for decompensated heart failure complicated by cardiogenic shock leading to LVAD placement.  He was in acute rehab from 8/21/22 - 9/4/22.  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 He was treated with vancomycin inpatient and ultimately transitioned to daptomycin for ease of dosing for 2-4 weeks.  He was subsequently placed on minocycline for suppression.  Since that time he has had ongoing episodes of C striatum breakthrough infection and is now on dalbavancin (previous agents tried include vancomycin, daptomycin, minocycline, linezolid). He has required debridement and has open draining small sternal wound and a secondary fistula near his driveline exit site (see picture below). He also recently had Serratia grow and finished a course of Bactrim earlier this month. Please also see [facility] notes for excellent timeline/recap of previous infections and interventions.     He is currently doing well overall on long-term dalbavancin but has had increasing issues with dizziness and near syncope for which he is being evaluated by cardiology.     LVAD History:  Current LVAD model: Heartmate III    Date current LVAD placed: 7/8/22    Previous LVAD devices: unknown    Other prosthetic devices/materials:  ICD     Primary cardiologist: Kelly Lora MD    Primary LVAD coordinator: Chantal Brahser RN    Primary ID provider: [facility], Joseph Reese and [facility] (Tony Latif, Aishwarya, Phuong, and Zoltan)    History of bacteremias (dates and organisms):  -Actinomyces 11/6/22     History of driveline infections (dates and organisms):   -10/2022 Corynebacterium striatum  -2023 Serratia  -August 2023 E coli    History of other pertinent infections:   -Hx C.diff 2022    History of driveline area irritation and current mitigation strategies:  -Currently performing daily  dressing changes    Current suppressive antibiotics:   -Dalbavancin    Previous antibiotic failures/allergies/intolerances etc:  -For C striatum has been on vancomycin, daptomycin, minocycline, linezolid    Transplant Plan:    -On hold due to sensitization      Allergies:   No Known Allergies    Immunizations:  Immunization History   Administered Date(s) Administered    COVID-19 12+ (2023-24) (Pfizer) 10/27/2023    COVID-19 Bivalent 12+ (Pfizer) 01/20/2023    COVID-19 Monovalent 18+ (Moderna) 03/13/2021, 04/10/2021, 08/24/2021    Influenza (IIV3) PF 12/30/2008, 09/22/2009, 10/17/2020    Influenza Vaccine >6 months,quad, PF 10/20/2017, 11/17/2018, 10/18/2019, 10/17/2020, 12/17/2021, 11/14/2022    Influenza Vaccine, 6+MO IM (QUADRIVALENT W/PRESERVATIVES) 10/17/2020    Influenza, seasonal, injectable, PF 10/25/2012    Influenza,INJ,MDCK,PF,Quad >6mo(Flucelvax) 12/17/2021    Pneumo Conj 13-V (2010&after) 04/09/2018    TDAP (Adacel,Boostrix) 03/14/2016    Tdap (Adult) Unspecified Formulation 03/14/2016    Zoster recombinant adjuvanted (SHINGRIX) 08/16/2019, 10/18/2019       Exam:  Temp 97.5  F (36.4  C)   SpO2 100%   Gen: Alert and in no distress.   Psych: Normal affect. Alert and oriented.   HEENT: EOMI. No icterus. Moist mucous membranes   Neck: No lymphadenopathy.   CV: Regular rate    Chest: Non-labored breathing, symmetric chest rise.   Abdomen: Soft, non-distended. See pictures below. Draining sternal wound cultured.   Extremities: Warm and well perfused.   Skin: No rashes or lesions noted.       Labs:  WBC Count   Date Value Ref Range Status   12/14/2023 3.2 (L) 4.0 - 11.0 10e3/uL Final       CRP Inflammation   Date Value Ref Range Status   06/19/2022 28.0 (H) 0.0 - 8.0 mg/L Final       Creatinine   Date Value Ref Range Status   12/14/2023 1.53 (H) 0.67 - 1.17 mg/dL Final   08/24/2023 1.12 0.67 - 1.17 mg/dL Final   03/26/2023 0.77 0.67 - 1.17 mg/dL Final             Again, thank you for allowing me to participate  in the care of your patient.      Sincerely,    Farida Charlton MD

## 2023-12-14 NOTE — PROGRESS NOTES
ANTICOAGULATION MANAGEMENT     Dandy Sands 63 year old male is on warfarin with subtherapeutic INR result. (Goal INR 2.0-3.0)    Recent labs: (last 7 days)     12/14/23  0826   INR 1.73*       ASSESSMENT     Source(s): Chart Review     Warfarin doses taken: Reviewed in chart  Diet: No new diet changes identified  Medication/supplement changes: None noted  New illness, injury, or hospitalization: Yes: Patient was in clinic today and experienced a near syncopal episode.  Patient went to the ER for hydration.   Signs or symptoms of bleeding or clotting: No  Previous result: Therapeutic last 2(+) visits  Additional findings:  Todays reading is venous and patient usually checks POC at home.       PLAN     Recommended plan for temporary change(s) affecting INR     Dosing Instructions: Continue your current warfarin dose with next INR in 1 week       Summary  As of 12/14/2023      Full warfarin instructions:  2.5 mg every Sun, Wed; 5 mg all other days   Next INR check:  12/21/2023               Detailed voice message left for Dandy with dosing instructions and follow up date.     Patient to recheck with home meter    Education provided:   Taking warfarin: Importance of taking warfarin as instructed  Goal range and lab monitoring: goal range and significance of current result and Importance of therapeutic range    Plan made per [facility] anticoagulation protocol and per LVAD protocol    Holli Silva RN  Anticoagulation Clinic  12/14/2023    _______________________________________________________________________     Anticoagulation Episode Summary       Current INR goal:  2.0-3.0   TTR:  55.5% (11.5 mo)   Target end date:  Indefinite   Send INR reminders to:  ANTICOAG LVAD    Indications    Chronic systolic congestive heart failure (H) [I50.22]  LVAD (left ventricular assist device) present (H) [Z95.811]  Heart failure with reduced ejection fraction  NYHA class III (H) [I50.20]  Left ventricular assist device present (H)  [J65.825]             Comments:  Follow VAD Anticoag protocol:Yes: HeartMate 3   Bridging: No bridging epistaxis.   Date VAD placed: 7/8/22             Anticoagulation Care Providers       Provider Role Specialty Phone number    Kelly Lora MD Referring Cardiovascular Disease [phone]

## 2023-12-14 NOTE — ED PROVIDER NOTES
ED Provider Note  [facility]      History     Chief Complaint   Patient presents with    Low MAP     HPI  Dandy Sands is a 63 year old male PMH of heart failure with LVAD , congestive heart failure, lupus who presents to the ER for low MAP.  The patient reports some lightheadedness over the last day.  States he has not been drinking much water but has been having lots of sodium containing foods.  States that he has had similar symptoms in the past attributed to dehydration.  Today he was in the cardiology clinic and was lightheaded there.  LVAD readings were taken which were concerning for volume down state.  Initially the plan was to change his medications to hopefully increase his MAP however patient felt near syncopal and a rapid was called and patient was then sent to the ED.  While lying flat patient states he feels well.  No recent chest pain, fevers, cough, or other complaints.    Past Medical History  Past Medical History:   Diagnosis Date    Antiphospholipid antibody syndrome (H24)     CAD (coronary artery disease)     Chronic systolic heart failure (H)     Ischemic cardiomyopathy     Mitral regurgitation     Systemic lupus erythematosus (H)      Past Surgical History:   Procedure Laterality Date    COLONOSCOPY N/A 05/23/2022    Procedure: COLONOSCOPY;  Surgeon: Parth Meneses MD;  Location: [facility] OR    CV CORONARY ANGIOGRAM N/A 05/24/2022    Procedure: Coronary Angiogram;  Surgeon: Mike Pope MD;  Location: [facility] CARDIAC CATH LAB    CV CORONARY ANGIOGRAM N/A 05/29/2022    Procedure: Coronary Angiogram;  Surgeon: Austin Bright MD;  Location: [facility] CARDIAC CATH LAB    CV CORONARY LITHOTRIPSY PCI Bilateral 05/24/2022    Procedure: Percutaneous Coronary Intervention - Lithotripsy;  Surgeon: Mike Pope MD;  Location: [facility] CARDIAC CATH LAB    CV INTRA AORTIC BALLOON N/A 06/17/2022    Procedure: Intraprocedure Aortic Balloon Pump Insertion;   Surgeon: Sherman Cerda MD;  Location:  HEART CARDIAC CATH LAB    CV INTRA AORTIC BALLOON Right 07/03/2022    Procedure: Reposition of Subclavian Intraprocedure Aortic Balloon Pump;  Surgeon: Austin Bright MD;  Location:  HEART CARDIAC CATH LAB    CV INTRAVASULAR ULTRASOUND N/A 05/24/2022    Procedure: Intravascular Ultrasound;  Surgeon: Mike Pope MD;  Location:  HEART CARDIAC CATH LAB    CV PCI ANGIOPLASTY N/A 05/29/2022    Procedure: Percutaneous Transluminal Angioplasty;  Surgeon: Austin Bright MD;  Location:  HEART CARDIAC CATH LAB    CV PCI STENT DRUG ELUTING N/A 05/24/2022    Procedure: Percutaneous Coronary Intervention Stent;  Surgeon: Mike Pope MD;  Location:  HEART CARDIAC CATH LAB    CV RIGHT HEART CATH MEASUREMENTS RECORDED N/A 05/18/2022    Procedure: Right Heart Catheterization;  Surgeon: Justo Stewart MD;  Location:  HEART CARDIAC CATH LAB    CV RIGHT HEART CATH MEASUREMENTS RECORDED N/A 05/24/2022    Procedure: Right Heart Catheterization;  Surgeon: Mike Pope MD;  Location:  HEART CARDIAC CATH LAB    CV RIGHT HEART CATH MEASUREMENTS RECORDED N/A 05/29/2022    Procedure: Right Heart Catheterization;  Surgeon: Austin Bright MD;  Location:  HEART CARDIAC CATH LAB    CV RIGHT HEART CATH MEASUREMENTS RECORDED N/A 07/01/2022    Procedure: Right Heart Catheterization;  Surgeon: Mike Pope MD;  Location:  HEART CARDIAC CATH LAB    CV RIGHT HEART CATH MEASUREMENTS RECORDED N/A 09/23/2022    Procedure: Right Heart Catheterization;  Surgeon: Justo Stewart MD;  Location:  HEART CARDIAC CATH LAB    CV RIGHT HEART EXERCISE STRESS STUDY N/A 05/18/2022    Procedure: Stress Drug Study;  Surgeon: Justo Stewart MD;  Location:  HEART CARDIAC CATH LAB    CV SWAN CHOCO PROCEDURE N/A 06/17/2022    Procedure: [place] Choco Procedure;  Surgeon: Sherman Cerda MD;  Location:  HEART CARDIAC CATH LAB    EP PACEMAKER OR ICD LEAD IMPLANT-ONE LEAD  N/A 01/13/2023    Procedure: CRT-D Lead revision;  Surgeon: Beckie Maurice MD;  Location: [facility] CARDIAC CATH LAB    INCISION AND DRAINAGE CHEST WASHOUT, COMBINED N/A 07/11/2022    Procedure: Chest washout and closure;  Surgeon: Darnell Morgan MD;  Location: [facility] OR    INCISION AND DRAINAGE CHEST WASHOUT, COMBINED N/A 07/12/2022    Procedure: INCISION AND DRAINAGE, WOUND, CHEST, WITH IRRIGATION;  Surgeon: Darius Zamora MD;  Location: [facility]    INCISION AND DRAINAGE CHEST WASHOUT, COMBINED N/A 03/18/2023    Procedure: Incision and drainage of driveline;  Surgeon: Darius Zamora MD;  Location: [facility] OR    INJECT NERVE OTHER PERIPHERAL Left 01/16/2023    Procedure: Cryoablation of intercostal nerves;  Surgeon: Kenroy Nguyen MD;  Location: [facility] GI    INSERT ARTERIAL LINE  07/18/2022         INSERT INTRAAORTIC BALLOON PUMP Right 06/23/2022    Procedure: INSERTION, INTRA-AORTIC BALLOON PUMP RIGHT SUBCLAVIAN ARTERY.;  Surgeon: Hayden Veras MD;  Location: [facility] OR    INSERT VENTRICULAR ASSIST DEVICE LEFT (HEARTMATE II/III) MINIMALLY INVASIVE N/A 07/08/2022    Procedure: MEDIAN STERNOTOMY.  CARDIOPULMONARY BYPASS.  INTRAOPERATIVE TRANSESOPHAGEAL ECHOCARDIOGRAM.  IMPLANT LEFT VENTRICULAR ASSIST DEVICE HEARTMATE III.  PLACEMENT OF PERCUTANEOUS RIGHT VENTRICULAR ASSIST DEVICE.  TEMPORARY CHEST CLOSURE;  Surgeon: Darius Zamora MD;  Location: [facility] OR    IR CHEST TUBE PLACEMENT NON-TUNNELED RIGHT  08/01/2022    IRRIGATION AND DEBRIDEMENT CHEST WASHOUT, COMBINED N/A 07/13/2022    Procedure: IRRIGATION AND DEBRIDEMENT, CHEST;  Surgeon: Darius Zamora MD;  Location: [facility] OR    MIDLINE DOUBLE LUMEN PLACEMENT Left 09/11/2022    Mid line ok to use    MIDLINE DOUBLE LUMEN PLACEMENT Right 09/14/2022    5FR DL midline.    PICC DOUBLE LUMEN PLACEMENT Right 05/28/2022    right basilic 5 fr dl picc 40 cm    PICC DOUBLE LUMEN PLACEMENT Right 08/15/2022    Right Lateral, 41 total length, 3 cm external length  "   PICC SINGLE LUMEN PLACEMENT Right 11/01/2022    41cm (2cm external), Lateral brachial vein, low SVC    PICC SINGLE LUMEN PLACEMENT Right 03/21/2023    Right lateral brachial, 40 cm     acetaminophen (TYLENOL) 325 MG tablet  amoxicillin (AMOXIL) 500 MG capsule  aspirin (ASA) 81 MG EC tablet  atorvastatin (LIPITOR) 40 MG tablet  dalbavancin (DALVANCE) 20 mg/mL  digoxin (LANOXIN) 125 MCG tablet  ferrous sulfate (FEROSUL) 325 (65 Fe) MG tablet  gabapentin (NEURONTIN) 100 MG capsule  hydroxychloroquine (PLAQUENIL) 200 MG tablet  lidocaine (LIDODERM) 5 % patch  lisinopril (ZESTRIL) 10 MG tablet  lisinopril (ZESTRIL) 5 MG tablet  loperamide (IMODIUM) 2 MG capsule  omeprazole (PRILOSEC) 40 MG DR capsule  tamsulosin (FLOMAX) 0.4 MG capsule  warfarin ANTICOAGULANT (COUMADIN) 2 MG tablet  warfarin ANTICOAGULANT (COUMADIN) 2.5 MG tablet      No Known Allergies  Family History  History reviewed. No pertinent family history.  Social History   Social History     Tobacco Use    Smoking status: Former    Smokeless tobacco: Never         A medically appropriate review of systems was performed with pertinent positives and negatives noted in the HPI, and all other systems negative.    Physical Exam   Pulse: 62  Temp: 98.4  F (36.9  C)  Resp: 18  Height: 175.3 cm (5' 9\")  Weight: 72.6 kg (160 lb)  SpO2: 93 %  Physical Exam  Constitutional:       General: He is not in acute distress.     Appearance: Normal appearance. He is not toxic-appearing.   HENT:      Head: Normocephalic and atraumatic.      Nose: Nose normal.      Mouth/Throat:      Mouth: Mucous membranes are moist.      Pharynx: Oropharynx is clear.   Eyes:      Extraocular Movements: Extraocular movements intact.      Pupils: Pupils are equal, round, and reactive to light.   Cardiovascular:      Comments: LVAD sounds  Pulmonary:      Effort: Pulmonary effort is normal. No respiratory distress.      Breath sounds: No wheezing.   Abdominal:      General: There is no " distension.      Palpations: Abdomen is soft.   Musculoskeletal:         General: Normal range of motion.      Cervical back: Normal range of motion.   Skin:     General: Skin is warm and dry.   Neurological:      General: No focal deficit present.      Mental Status: He is alert and oriented to person, place, and time.           ED Course, Procedures, & Data      Procedures                      Results for orders placed or performed during the hospital encounter of 12/14/23   Troponin T, High Sensitivity     Status: Normal   Result Value Ref Range    Troponin T, High Sensitivity 14 <=22 ng/L   [place] Draw     Status: None    Narrative    The following orders were created for panel order [place] Draw.  Procedure                               Abnormality         Status                     ---------                               -----------         ------                     Extra Blue Top Tube[[id number]]                              Final result               Extra Purple Top Tube[[id number]]                            Final result                 Please view results for these tests on the individual orders.   Extra Blue Top Tube     Status: None   Result Value Ref Range    Hold Specimen JIC    Extra Purple Top Tube     Status: None   Result Value Ref Range    Hold Specimen JIC    Lactate Dehydrogenase     Status: Normal   Result Value Ref Range    Lactate Dehydrogenase 177 0 - 250 U/L   EKG 12 lead     Status: None   Result Value Ref Range    Systolic Blood Pressure  mmHg    Diastolic Blood Pressure  mmHg    Ventricular Rate 83 BPM    Atrial Rate 63 BPM    NE Interval  ms    QRS Duration 160 ms     ms    QTc 524 ms    P Axis  degrees    R AXIS -17 degrees    T Axis 88 degrees    Interpretation ECG       Wide QRS rhythm with occasional Premature ventricular complexes  Non-specific intra-ventricular conduction block  Abnormal ECG  Unconfirmed report - interpretation of this ECG is computer generated - see  medical record for final interpretation  Confirmed by - EMERGENCY ROOM, PHYSICIAN ([id number]),  SUNITA CONKLIN ([id number]) on 12/14/2023 1:18:51 PM     Results for orders placed or performed in visit on 12/14/23   Abscess Aerobic Bacterial Culture Routine With Gram Stain     Status: Abnormal (Preliminary result)    Specimen: Abdomen; Abscess   Result Value Ref Range    Gram Stain Result 2+ Gram negative bacilli (A)     Gram Stain Result 4+ WBC seen (A)    Results for orders placed or performed in visit on 12/14/23   Transferrin     Status: Normal   Result Value Ref Range    Transferrin 216.0 200.0 - 360.0 mg/dL   N terminal pro BNP outpatient     Status: Normal   Result Value Ref Range    N Terminal Pro BNP Outpatient 827 0 - 900 pg/mL   Iron & Iron Binding Capacity     Status: Abnormal   Result Value Ref Range    Iron 42 (L) 61 - 157 ug/dL    Iron Binding Capacity 269 240 - 430 ug/dL    Iron Sat Index 16 15 - 46 %   INR     Status: Abnormal   Result Value Ref Range    INR 1.73 (H) 0.85 - 1.15   Lactate Dehydrogenase     Status: Normal   Result Value Ref Range    Lactate Dehydrogenase 165 0 - 250 U/L   Comprehensive metabolic panel     Status: Abnormal   Result Value Ref Range    Sodium 137 135 - 145 mmol/L    Potassium 4.3 3.4 - 5.3 mmol/L    Carbon Dioxide (CO2) 19 (L) 22 - 29 mmol/L    Anion Gap 13 7 - 15 mmol/L    Urea Nitrogen 33.2 (H) 8.0 - 23.0 mg/dL    Creatinine 1.53 (H) 0.67 - 1.17 mg/dL    GFR Estimate 51 (L) >60 mL/min/1.73m2    Calcium 9.2 8.8 - 10.2 mg/dL    Chloride 105 98 - 107 mmol/L    Glucose 138 (H) 70 - 99 mg/dL    Alkaline Phosphatase 206 (H) 40 - 150 U/L    AST 32 0 - 45 U/L    ALT 33 0 - 70 U/L    Protein Total 7.5 6.4 - 8.3 g/dL    Albumin 4.2 3.5 - 5.2 g/dL    Bilirubin Total 0.4 <=1.2 mg/dL   Ferritin     Status: Normal   Result Value Ref Range    Ferritin 292 31 - 409 ng/mL   Folate     Status: Normal   Result Value Ref Range    Folic Acid 7.9 4.6 - 34.8 ng/mL   Vitamin B12     Status:  Normal   Result Value Ref Range    Vitamin B12 422 232 - 1,245 pg/mL   CBC with platelets and differential     Status: Abnormal   Result Value Ref Range    WBC Count 3.2 (L) 4.0 - 11.0 10e3/uL    RBC Count 3.64 (L) 4.40 - 5.90 10e6/uL    Hemoglobin 9.6 (L) 13.3 - 17.7 g/dL    Hematocrit 29.4 (L) 40.0 - 53.0 %    MCV 81 78 - 100 fL    MCH 26.4 (L) 26.5 - 33.0 pg    MCHC 32.7 31.5 - 36.5 g/dL    RDW 17.9 (H) 10.0 - 15.0 %    Platelet Count 177 150 - 450 10e3/uL    % Neutrophils 57 %    % Lymphocytes 22 %    % Monocytes 15 %    % Eosinophils 5 %    % Basophils 1 %    % Immature Granulocytes 0 %    NRBCs per 100 WBC 0 <1 /100    Absolute Neutrophils 1.8 1.6 - 8.3 10e3/uL    Absolute Lymphocytes 0.7 (L) 0.8 - 5.3 10e3/uL    Absolute Monocytes 0.5 0.0 - 1.3 10e3/uL    Absolute Eosinophils 0.2 0.0 - 0.7 10e3/uL    Absolute Basophils 0.0 0.0 - 0.2 10e3/uL    Absolute Immature Granulocytes 0.0 <=0.4 10e3/uL    Absolute NRBCs 0.0 10e3/uL   CBC with Platelets & Differential     Status: Abnormal    Narrative    The following orders were created for panel order CBC with Platelets & Differential.  Procedure                               Abnormality         Status                     ---------                               -----------         ------                     CBC with platelets and d...[[id number]]  Abnormal            Final result                 Please view results for these tests on the individual orders.     Medications   lidocaine 1 % 0.1-1 mL (has no administration in time range)   lidocaine (LMX4) cream (has no administration in time range)   sodium chloride (PF) 0.9% PF flush 3 mL (has no administration in time range)   sodium chloride (PF) 0.9% PF flush 3 mL (has no administration in time range)   medication instruction (has no administration in time range)   alum & mag hydroxide-simethicone (MAALOX) suspension 30 mL (has no administration in time range)   acetaminophen (TYLENOL) tablet 650 mg (has no  administration in time range)   acetaminophen (TYLENOL) Suppository 650 mg (has no administration in time range)   Patient is already receiving anticoagulation with heparin, enoxaparin (LOVENOX), warfarin (COUMADIN)  or other anticoagulant medication (has no administration in time range)   calcium carbonate (TUMS) chewable tablet 1,000 mg (has no administration in time range)   bisacodyl (DULCOLAX) EC tablet 5 mg (has no administration in time range)     Or   bisacodyl (DULCOLAX) EC tablet 10 mg (has no administration in time range)     Or   bisacodyl (DULCOLAX) EC tablet 15 mg (has no administration in time range)   ondansetron (ZOFRAN ODT) ODT tab 4 mg (has no administration in time range)     Or   ondansetron (ZOFRAN) injection 4 mg (has no administration in time range)   sodium chloride 0.9% BOLUS 1,000 mL (0 mLs Intravenous Stopped 12/14/23 1407)     Labs Ordered and Resulted from Time of ED Arrival to Time of ED Departure   TROPONIN T, HIGH SENSITIVITY - Normal       Result Value    Troponin T, High Sensitivity 14     LACTATE DEHYDROGENASE - Normal    Lactate Dehydrogenase 177       No orders to display          Critical care was not performed.     Medical Decision Making  The patient's presentation was of high complexity (a chronic illness severe exacerbation, progression, or side effect of treatment).    The patient's evaluation involved:  review of external note(s) from 3+ sources (cardiology note, rapid response note, ID note)  review of 3+ test result(s) ordered prior to this encounter (cbc, cmp, inr, labs from today)  ordering and/or review of 2 test(s) in this encounter (troponon, LDH)  discussion of management or test interpretation with another health professional (cardiology)    The patient's management necessitated moderate risk (prescription drug management including medications given in the ED).    Assessment & Plan    63yoM with history of heart failure with a LVAD here for light headedness and  near syncope. He presented from clinic where he had been near syncopal and had a low MAP thought to be from dehydration. He admitted to decreased PO intake recently and stated this had happened before. Labs from today were reviewed and notable for hgb elevated from baseline suggestive of possible hemoconcentration and JOSE. I spoke with the cardiology clinic provider that sent him in and they recommended giving fluids as presentation was most consistent with volume down status. A EKG was done here and appeared at baseline, troponin was normal. He was given fluids and did report he felt well and wanted to be discharged. He was ambulatory and denied any symptoms. I recommended he wait to see cardiology and had spoken to cardiology II here and requested they evaluate him. He was initially agreeable to waiting but then later left on his own.     I have reviewed the nursing notes. I have reviewed the findings, diagnosis, plan and need for follow up with the patient.    Discharge Medication List as of 12/14/2023  3:52 PM          Final diagnoses:   Light headedness       Joseph Oconnell MD  [facility] EMERGENCY DEPARTMENT  12/14/2023     Josehp Oconnell MD  12/14/23 2885

## 2023-12-15 ENCOUNTER — CARE COORDINATION (OUTPATIENT)
Dept: CARDIOLOGY | Facility: CLINIC | Age: 63
End: 2023-12-15

## 2023-12-15 LAB
INR (EXTERNAL): 1.6 (ref 0.9–1.1)
STFR SERPL-MCNC: 4.1 MG/L

## 2023-12-15 NOTE — PROGRESS NOTES
Situation:   Writer spoke with Family today to discuss low MAPs.     Background:  Patient in clinic yesterday with presyncopal episode, went to [facility] ED where he received fluid and was discharged home. Instructed to hold lisinopril, liberalize fluid intake and check MAP daily.     Assessment:  NIBP/MAP: 62  VAD parameters: Speed: 5100 rpm's, Flow: 4.0 l/min, PI: 4.0, Power: 3.7w  Symptoms:   Pt reports feeling dizzy and light headed. No signs of bleeding or infection, however LVAD drive line exit site culture obtained on 12/14 came back positive for Escherichia coli.   Symptoms are same.   Onset of symptoms: The last few days, believes that when the abx was halved by [facility] ID team, he started to feel bad.    Applicable medication changes: Holding Lisinopril through the weekend.     Recommendation:  Discussed with Dr. Lora who agrees patient should be seen in [facility] ED. Called RN charge as well as the [facility] cardiology NP so they are aware of his arrival. Provided number to patient placement ([phone]) if provider needs to speak to cardiologist on call.  Family notified to page on-call coordinator if symptoms worsen or with other concerns. Family verbalized understanding.

## 2023-12-16 LAB — INR (EXTERNAL): 1.8 (ref 0.9–1.1)

## 2023-12-17 LAB
BACTERIA ABSC ANAEROBE+AEROBE CULT: ABNORMAL
GRAM STAIN RESULT: ABNORMAL
GRAM STAIN RESULT: ABNORMAL
INR (EXTERNAL): 2.1 (ref 0.9–1.1)

## 2023-12-18 ENCOUNTER — CARE COORDINATION (OUTPATIENT)
Dept: CARDIOLOGY | Facility: CLINIC | Age: 63
End: 2023-12-18

## 2023-12-18 LAB
ALP BONE SERPL-CCNC: 34 U/L
ALP LIVER SERPL-CCNC: 178 U/L
ALP OTHER SERPL-CCNC: 0 U/L
ALP SERPL-CCNC: 212 U/L
INR (EXTERNAL): 2.2 (ref 0.9–1.1)

## 2023-12-18 NOTE — PROGRESS NOTES
Contacted by Kateryna Zeng, HF NP at [facility] caring for Dandy.    Dandy was admitted over the weekend for hypovolemia and possible infection, however no growth from blood cultures. She reports his MAPs have been low, so they held Lisinopril as well as gave fluids.     Today she reports he's doing well, MAPs in the 70s with still no growth on cultures. They would like to discharge but had a few questions regarding lisinopril and follow up.     Spoke with Dr. Lora who states Dandy does not have to be on Lisinopril unless MAPs are high. He also said it was okay for Dandy to follow up with [facility] HF team in the next week. She verbalized understanding.     Encouraged Kateryna to call or page with any other questions or concerns regarding Dandy's care.

## 2023-12-19 ENCOUNTER — CARE COORDINATION (OUTPATIENT)
Dept: CARDIOLOGY | Facility: CLINIC | Age: 63
End: 2023-12-19

## 2023-12-19 NOTE — PROGRESS NOTES
"Called patient and patient's son, Amos to check in post-discharge from OSH for symptomatic hypotension. Pt reports VAD parameters WNL and weight WNL. Per pt, follow up appointment not scheduled upon discharge. Recommended that pt call his cardiology clinic in [place] to follow up next week if possible. Pt will contact me if they are unable to secure a timely follow up appointment. Plan to check MAP every other day or PRN. Pt will report any new symptoms to the VAD coordinator on call. Pt notes that his MAP is 82 today and is \"feeling great\". Confirmed that we will continue to hold Lisinopril until they are instructed to restart. Encouraged pt to page VAD Coordinator with any issues or questions, if MAP is <65 and/or if they are not able to schedule a follow up soon. Pt verbalizes understanding. Will plan to check in again with patient later this week.   "

## 2023-12-21 ENCOUNTER — ANTICOAGULATION THERAPY VISIT (OUTPATIENT)
Dept: ANTICOAGULATION | Facility: CLINIC | Age: 63
End: 2023-12-21

## 2023-12-21 DIAGNOSIS — I50.22 CHRONIC SYSTOLIC CONGESTIVE HEART FAILURE (H): Primary | ICD-10-CM

## 2023-12-21 DIAGNOSIS — Z95.811 LVAD (LEFT VENTRICULAR ASSIST DEVICE) PRESENT (H): ICD-10-CM

## 2023-12-21 DIAGNOSIS — I50.20 HEART FAILURE WITH REDUCED EJECTION FRACTION, NYHA CLASS III (H): ICD-10-CM

## 2023-12-21 DIAGNOSIS — Z95.811 LEFT VENTRICULAR ASSIST DEVICE PRESENT (H): ICD-10-CM

## 2023-12-21 LAB
BACTERIA SPEC CULT: NORMAL
INR (EXTERNAL): 1.9 (ref 0.9–1.1)

## 2023-12-21 NOTE — PROGRESS NOTES
ANTICOAGULATION MANAGEMENT     Dandy Sands 63 year old male is on warfarin with subtherapeutic INR result. (Goal INR 2.0-3.0)    Recent labs: (last 7 days)     12/21/23  0000   INR 1.9*       ASSESSMENT     Source(s): Chart Review and Patient/Caregiver Call     Warfarin doses taken: More warfarin taken than planned which may be affecting INR - while inpatient for low INR  Diet: No new diet changes identified  Medication/supplement changes:  Bactrim stopped on 12/15 which may be decreasing INR today  cirprofloxacin 14 day course (dates: 12/18-1/1) which may be increasing INR today  New illness, injury, or hospitalization: Yes: hospitalized 12/15-12/18 for dizziness/hypotension, ongoing line drive infection  Signs or symptoms of bleeding or clotting: No  Previous result: Therapeutic last 2(+) visits (inpatient results)  Additional findings:  Notable that most recent pre-bactrim dose was 7.5 mg twice a week, 5 mg all other days.        PLAN     Recommended plan for temporary change(s) and ongoing change(s) affecting INR     Dosing Instructions: booster dose then Increase your warfarin dose (16.7% change) with next INR in 5 days       Summary  As of 12/21/2023      Full warfarin instructions:  12/21: 7.5 mg; Otherwise 5 mg every day   Next INR check:  12/28/2023               Telephone call with Dandy who agrees to plan and repeated back plan correctly    Patient using outside facility for labs - states he will have his INR checked next Thursday, 12/28 when he is in for picc line dressing change. Recommended he check 26th or 27th, but patient declined to make extra trip to lab next week.     Education provided:   Goal range and lab monitoring: goal range and significance of current result and Importance of following up at instructed interval  Interaction IS anticipated between warfarin and bactrim and cipro    Plan made with [facility] Pharmacist Pepper Marie RN  Anticoagulation  Clinic  12/21/2023    _______________________________________________________________________     Anticoagulation Episode Summary       Current INR goal:  2.0-3.0   TTR:  56.6% (11.5 mo)   Target end date:  Indefinite   Send INR reminders to:  ANTICOAG LVAD    Indications    Chronic systolic congestive heart failure (H) [I50.22]  LVAD (left ventricular assist device) present (H) [Z95.811]  Heart failure with reduced ejection fraction  NYHA class III (H) [I50.20]  Left ventricular assist device present (H) [Z95.811]             Comments:  Follow VAD Anticoag protocol:Yes: HeartMate 3   Bridging: No bridging epistaxis.   Date VAD placed: 7/8/22             Anticoagulation Care Providers       Provider Role Specialty Phone number    Kelly Lora MD Referring Cardiovascular Disease [phone]

## 2023-12-26 ENCOUNTER — CARE COORDINATION (OUTPATIENT)
Dept: CARDIOLOGY | Facility: CLINIC | Age: 63
End: 2023-12-26

## 2023-12-26 NOTE — PROGRESS NOTES
"Called to check in with patient regarding MAP's and how patient has been feeling after low MAP's requiring fluid resuscitation. Per pt, has been \"feeling great\" and endorses substantial increase in water intake. Pt denies any abnormal VAD parameters and/or alarms. Pt has a follow up with [place] cardiology clinic on 12/28. Will check in with patient after this appointment to follow up on any recommended changes, including restarting pt's Lisinopril, which has been held since 12/19. Pt notes that his MAP's have been in the 80's, which is patient's baseline. Pt does not have any questions or concerns at this time.   "

## 2023-12-27 ENCOUNTER — CARE COORDINATION (OUTPATIENT)
Dept: CARDIOLOGY | Facility: CLINIC | Age: 63
End: 2023-12-27

## 2023-12-27 RX ORDER — WARFARIN SODIUM 2.5 MG/1
TABLET ORAL
Qty: 192 TABLET | Refills: 3 | OUTPATIENT
Start: 2023-12-27

## 2023-12-27 NOTE — PROGRESS NOTES
D:  Pt reports he had a recent COVID exposure.  No c/o HA, achiness and nausea.  Called PCP and they recommended he go to Urgent care.  Son advised pt calling VAD team.  Pt denies SOB or issues w/ VAD parameters.  I:  Instructed pt to follow PCP instructions.  Go to ED if increased SOB.  A:  Pt verbalized understanding.  P:  VAD Coordinator available for questions or concerns.

## 2023-12-29 ENCOUNTER — CARE COORDINATION (OUTPATIENT)
Dept: CARDIOLOGY | Facility: CLINIC | Age: 63
End: 2023-12-29

## 2023-12-29 NOTE — PROGRESS NOTES
D:  Received call from NP at [facility].  Pt admitted for N/V, Diarrhea.  Pt has been dehydrated.  No cardiac issues.  No VAD alarms or issues.  P:  Update to Dr. Lora,  Dr Prabhakar and primary VAD Coordinator.

## 2023-12-30 LAB — INR (EXTERNAL): 2.8 (ref 0.9–1.1)

## 2024-01-01 LAB — INR (EXTERNAL): 2.6 (ref 0.9–1.1)

## 2024-01-02 ENCOUNTER — ANTICOAGULATION THERAPY VISIT (OUTPATIENT)
Dept: ANTICOAGULATION | Facility: CLINIC | Age: 64
End: 2024-01-02
Payer: COMMERCIAL

## 2024-01-02 ENCOUNTER — CARE COORDINATION (OUTPATIENT)
Dept: CARDIOLOGY | Facility: CLINIC | Age: 64
End: 2024-01-02
Payer: COMMERCIAL

## 2024-01-02 DIAGNOSIS — Z95.811 LEFT VENTRICULAR ASSIST DEVICE PRESENT (H): ICD-10-CM

## 2024-01-02 DIAGNOSIS — I50.22 CHRONIC SYSTOLIC CONGESTIVE HEART FAILURE (H): Primary | ICD-10-CM

## 2024-01-02 DIAGNOSIS — Z95.811 LVAD (LEFT VENTRICULAR ASSIST DEVICE) PRESENT (H): ICD-10-CM

## 2024-01-02 DIAGNOSIS — I50.20 HEART FAILURE WITH REDUCED EJECTION FRACTION, NYHA CLASS III (H): ICD-10-CM

## 2024-01-02 NOTE — PROGRESS NOTES
ANTICOAGULATION MANAGEMENT     Dandy Sands 63 year old male is on warfarin with therapeutic INR result. (Goal INR 2.0-3.0)    Recent labs: (last 7 days)     01/01/24  0000   INR 2.6*       ASSESSMENT     Source(s): Chart Review and Patient/Caregiver Call     Warfarin doses taken: Warfarin taken as instructed  Diet: No new diet changes identified  Medication/supplement changes: Continues on home Ciprofloxacin. Is being following by infectious disease. Is on IV Dalbavanci every 2 weeks. No interactions expected.   New illness, injury, or hospitalization: Yes: Was hospitalized at [facility] 12/28-1/1 for Nausea/vomiting/diarrhea. Symptoms resolved at time of discharge  Signs or symptoms of bleeding or clotting: No  Previous result: Therapeutic last visit; previously outside of goal range  Additional findings: Has PICC line changes every Thursday.      PLAN     Recommended plan for temporary change(s) affecting INR     Dosing Instructions: Continue your current warfarin dose with next INR in 3 days       Summary  As of 1/2/2024      Full warfarin instructions:  5 mg every day   Next INR check:  1/4/2024               Telephone call with Dandy who verbalizes understanding and agrees to plan    Patient using outside facility for labs    Education provided:   Goal range and lab monitoring: goal range and significance of current result  Contact [phone] with any changes, questions or concerns.     Plan made per [facility] anticoagulation protocol and per LVAD protocol    Parul Nunez RN  Anticoagulation Clinic  1/2/2024    _______________________________________________________________________     Anticoagulation Episode Summary       Current INR goal:  2.0-3.0   TTR:  58.6% (11.5 mo)   Target end date:  Indefinite   Send INR reminders to:  ANTICOAG LVAD    Indications    Chronic systolic congestive heart failure (H) [I50.22]  LVAD (left ventricular assist device) present (H) [Z95.811]  Heart failure with reduced  ejection fraction  NYHA class III (H) [I50.20]  Left ventricular assist device present (H) [Z95.811]             Comments:  Follow VAD Anticoag protocol:Yes: HeartMate 3   Bridging: No bridging epistaxis.   Date VAD placed: 7/8/22             Anticoagulation Care Providers       Provider Role Specialty Phone number    Kelly Lora MD Referring Cardiovascular Disease [phone]

## 2024-01-02 NOTE — PROGRESS NOTES
"Called pt to check in 24 hours post hospital discharge for salmonella poisoning. Pt has been \"feeling great\" today and has been resting well at home. Pt has a follow up appointment next week with his PCP as a post-discharge follow up appointment.     Per pt, MAP yesterday was 89, and will be planning on taking MAP again tomorrow when he sees his son. No c/o nausea and endorses good oral intake, however still having diarrhea. Encouraged pt to drink plenty of fluids, as this has historically been a challenge for patient even without GI upset. Emphasized importance of keeping an eye out for abnormal VAD parameters, and reporting those/any new or worsening symptoms to the VAD coordinator on call. Pt expressed understanding of recommendations.   "

## 2024-01-05 NOTE — PROGRESS NOTES
[facility]  Clinic Follow-Up Visit  12/14/23    Chief Complaint:  LVAD infection    Assessment and Plan:  Dandy Sands is a 62 year old male with a history of SLE, antiphospholipid syndrome, CAD, CHF 2/2 ICM s/p LVAD 7/8/22, history of C.diff who presents today for management of chronic driveline infection. He is managed primarily by Dr. Steve Saravia with [facility]. He was in [place] today for cardiology appointment and so was asked to check in with ID here as well. 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 He remains on dalbavancin for the C striatum.     After he was seen in ID clinic, he became increasingly syncopal in cardiology clinic and was sent to the ED.     Plan:   Continue dalbavancin through [facility] team  Repeat culture today.     Continue to follow-up with [facility]. If I can be of help coordinating phage therapy, etc., please let me know.     Farida Charlton MD  Infectious Diseases  Pager [phone]     HPI:  Dandy Sands is a 62 year old male with a history of SLE, antiphospholipid syndrome, CAD, CHF 2/2 ICM s/p LVAD 7/8/22, history of C.diff who presents today for management of chronic driveline infection.  Patient has had a complex history since his initial LVAD placement in 7/2022.  He was hospitalized 6/17/22 - 8/21/22 for decompensated heart failure complicated by cardiogenic shock leading to LVAD placement.  He was in acute rehab from 8/21/22 - 9/4/22.  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 He was treated with vancomycin inpatient and ultimately transitioned to daptomycin for ease of dosing for 2-4 weeks.  He was subsequently placed on minocycline for suppression.  Since that time he has had ongoing episodes of C striatum breakthrough  infection and is now on dalbavancin (previous agents tried include vancomycin, daptomycin, minocycline, linezolid). He has required debridement and has open draining small sternal wound and a secondary fistula near his driveline exit site (see picture below). He also recently had Serratia grow and finished a course of Bactrim earlier this month. Please also see [facility] notes for excellent timeline/recap of previous infections and interventions.     He is currently doing well overall on long-term dalbavancin but has had increasing issues with dizziness and near syncope for which he is being evaluated by cardiology.     LVAD History:  Current LVAD model: Heartmate III    Date current LVAD placed: 7/8/22    Previous LVAD devices: unknown    Other prosthetic devices/materials:  ICD     Primary cardiologist: Kelly Lora MD    Primary LVAD coordinator: Chantal Brasher RN    Primary ID provider: Clifton HARDIN, Joseph Reese and [facility] (Tony Latif, Aishwarya, Phuong, and Zoltan)    History of bacteremias (dates and organisms):  -Actinomyces 11/6/22     History of driveline infections (dates and organisms):   -10/2022 Corynebacterium striatum  -2023 Serratia  -August 2023 E coli    History of other pertinent infections:   -Hx C.diff 2022    History of driveline area irritation and current mitigation strategies:  -Currently performing daily dressing changes    Current suppressive antibiotics:   -Dalbavancin    Previous antibiotic failures/allergies/intolerances etc:  -For C striatum has been on vancomycin, daptomycin, minocycline, linezolid    Transplant Plan:    -On hold due to sensitization      Allergies:   No Known Allergies    Immunizations:  Immunization History   Administered Date(s) Administered    COVID-19 12+ (2023-24) (Pfizer) 10/27/2023    COVID-19 Bivalent 12+ (Pfizer) 01/20/2023    COVID-19 Monovalent 18+ (Moderna) 03/13/2021, 04/10/2021, 08/24/2021    Influenza (IIV3)  PF 12/30/2008, 09/22/2009, 10/17/2020    Influenza Vaccine >6 months,quad, PF 10/20/2017, 11/17/2018, 10/18/2019, 10/17/2020, 12/17/2021, 11/14/2022    Influenza Vaccine, 6+MO IM (QUADRIVALENT W/PRESERVATIVES) 10/17/2020    Influenza, seasonal, injectable, PF 10/25/2012    Influenza,INJ,MDCK,PF,Quad >6mo(Flucelvax) 12/17/2021    Pneumo Conj 13-V (2010&after) 04/09/2018    TDAP (Adacel,Boostrix) 03/14/2016    Tdap (Adult) Unspecified Formulation 03/14/2016    Zoster recombinant adjuvanted (SHINGRIX) 08/16/2019, 10/18/2019       Exam:  Temp 97.5  F (36.4  C)   SpO2 100%   Gen: Alert and in no distress.   Psych: Normal affect. Alert and oriented.   HEENT: EOMI. No icterus. Moist mucous membranes   Neck: No lymphadenopathy.   CV: Regular rate    Chest: Non-labored breathing, symmetric chest rise.   Abdomen: Soft, non-distended. See pictures below. Draining sternal wound cultured.   Extremities: Warm and well perfused.   Skin: No rashes or lesions noted.       Labs:  WBC Count   Date Value Ref Range Status   12/14/2023 3.2 (L) 4.0 - 11.0 10e3/uL Final       CRP Inflammation   Date Value Ref Range Status   06/19/2022 28.0 (H) 0.0 - 8.0 mg/L Final       Creatinine   Date Value Ref Range Status   12/14/2023 1.53 (H) 0.67 - 1.17 mg/dL Final   08/24/2023 1.12 0.67 - 1.17 mg/dL Final   03/26/2023 0.77 0.67 - 1.17 mg/dL Final

## 2024-01-08 ENCOUNTER — ANTICOAGULATION THERAPY VISIT (OUTPATIENT)
Dept: ANTICOAGULATION | Facility: CLINIC | Age: 64
End: 2024-01-08
Payer: COMMERCIAL

## 2024-01-08 DIAGNOSIS — Z95.811 LEFT VENTRICULAR ASSIST DEVICE PRESENT (H): ICD-10-CM

## 2024-01-08 DIAGNOSIS — I50.22 CHRONIC SYSTOLIC CONGESTIVE HEART FAILURE (H): Primary | ICD-10-CM

## 2024-01-08 DIAGNOSIS — I50.20 HEART FAILURE WITH REDUCED EJECTION FRACTION, NYHA CLASS III (H): ICD-10-CM

## 2024-01-08 DIAGNOSIS — Z95.811 LVAD (LEFT VENTRICULAR ASSIST DEVICE) PRESENT (H): ICD-10-CM

## 2024-01-08 LAB — INR (EXTERNAL): 3.2 (ref 0.9–1.1)

## 2024-01-08 NOTE — PROGRESS NOTES
ANTICOAGULATION MANAGEMENT     Dandy Sands 63 year old male is on warfarin with supratherapeutic INR result. (Goal INR 2.0-3.0)    Recent labs: (last 7 days)     01/08/24  0000   INR 3.2*       ASSESSMENT     Source(s): Chart Review and Patient/Caregiver Call     Warfarin doses taken: Warfarin taken as instructed  Diet: No new diet changes identified  Medication/supplement changes: Patient is on Cipro and Delvance only None noted  New illness, injury, or hospitalization: Yes: Hospitalized 12/29-1/1/24 nausea,vomiting and diarrhea.  Symptoms have resolved. Patient also is being treated for a drive line infection.   Signs or symptoms of bleeding or clotting: No  Previous result: Therapeutic last 2(+) visits  Additional findings: None       PLAN     Recommended plan for ongoing change(s) affecting INR     Dosing Instructions: decrease your warfarin dose (7% change) with next INR in 1 week       Summary  As of 1/8/2024      Full warfarin instructions:  1/8: 2.5 mg; Otherwise 2.5 mg every Mon; 5 mg all other days   Next INR check:  1/15/2024               Telephone call with Dandy who verbalizes understanding and agrees to plan and who agrees to plan and repeated back plan correctly    Patient using outside facility for labs    Education provided:   Taking warfarin: Importance of taking warfarin as instructed  Goal range and lab monitoring: goal range and significance of current result and Importance of therapeutic range    Plan made per [facility] anticoagulation protocol and per LVAD protocol    Holli Silva RN  Anticoagulation Clinic  1/8/2024    _______________________________________________________________________     Anticoagulation Episode Summary       Current INR goal:  2.0-3.0   TTR:  58.6% (11.5 mo)   Target end date:  Indefinite   Send INR reminders to:  ANTICOAG LVAD    Indications    Chronic systolic congestive heart failure (H) [I50.22]  LVAD (left ventricular assist device) present (H) [Z95.811]  Heart  failure with reduced ejection fraction  NYHA class III (H) [I50.20]  Left ventricular assist device present (H) [Z95.811]             Comments:  Follow VAD Anticoag protocol:Yes: HeartMate 3   Bridging: No bridging epistaxis.   Date VAD placed: 7/8/22             Anticoagulation Care Providers       Provider Role Specialty Phone number    Kelly Lora MD Referring Cardiovascular Disease [phone]

## 2024-01-09 ENCOUNTER — TELEPHONE (OUTPATIENT)
Dept: CARDIOLOGY | Facility: CLINIC | Age: 64
End: 2024-01-09
Payer: COMMERCIAL

## 2024-01-12 ENCOUNTER — CARE COORDINATION (OUTPATIENT)
Dept: CARDIOLOGY | Facility: CLINIC | Age: 64
End: 2024-01-12
Payer: COMMERCIAL

## 2024-01-12 ENCOUNTER — TELEPHONE (OUTPATIENT)
Dept: CARDIOLOGY | Facility: CLINIC | Age: 64
End: 2024-01-12
Payer: COMMERCIAL

## 2024-01-12 DIAGNOSIS — Z95.811 LVAD (LEFT VENTRICULAR ASSIST DEVICE) PRESENT (H): Primary | ICD-10-CM

## 2024-01-12 NOTE — PROGRESS NOTES
"Spoke with patient regarding importance of scheduling cardiology follow-up for recommended frequency of every three months. Per pt, it is difficult for him to travel to [place] in the middle of the winter, and costs are too high for him to afford lodging and gas to do so.     Recommended that pt reach out to his , Yas Ennis for possible resources and financial support in traveling to clinic for follow-up. Pt reluctant to come to clinic until June, as he \"feels amazing\" and does not see the worth of coming all the way to the [facility] to have some labs taken and be sent on his way. Validated pt's opinions and provided positive feedback regarding how good he feels and how well he's been adhering to medical advice and follow up. Also performed reeducation about importance of having a VAD coordinator assessing his DLES, checking his equipment and performing interrogations, as these are things that are not available at his local cardiology clinic.     Pt amenable to scheduling with Dr. Lora in April as soonest in-person cardiology clinic visit, as he is still reluctant to drive in winter weather. Pt will call the VAD  when he is available to schedule. Recommended that pt take special care to report any new symptoms, abnormal VAD parameters and/or alarms to the VAD coordinator on call in the meantime. Pt expressed understanding of recommendations and plans.  "

## 2024-01-12 NOTE — TELEPHONE ENCOUNTER
Called patient to set up VAD appt for March, per Darline VAD Coordinator's request. She had spoken with Dandy recently about this plan, given the complexities of his last visit/VAD issues. Patient declined the appt, stating that wasn't the plan and was told differently. We did not schedule anything and I let Darline know.

## 2024-01-16 ENCOUNTER — MYC MEDICAL ADVICE (OUTPATIENT)
Dept: ANTICOAGULATION | Facility: CLINIC | Age: 64
End: 2024-01-16
Payer: COMMERCIAL

## 2024-01-19 ENCOUNTER — CARE COORDINATION (OUTPATIENT)
Dept: CARDIOLOGY | Facility: CLINIC | Age: 64
End: 2024-01-19
Payer: COMMERCIAL

## 2024-01-19 LAB — INR (EXTERNAL): 1.5 (ref 0.9–1.1)

## 2024-01-19 NOTE — PROGRESS NOTES
D: On call coordinator paged by Shelia from [facility].    I/A: Patient admitted at [facility] for failure to thrive. No concerns regarding lvad or heart.     P: Will update primary coordinator, cardiologist, and on-call cardiologist.  Caregiver notified to page on-call coordinator if symptoms worsen or with other concerns. Caregiver verbalized understanding.

## 2024-01-20 LAB — INR (EXTERNAL): 1.5 (ref 0.9–1.1)

## 2024-01-21 LAB — INR (EXTERNAL): 1.7 (ref 0.9–1.1)

## 2024-01-22 ENCOUNTER — TELEPHONE (OUTPATIENT)
Dept: ANTICOAGULATION | Facility: CLINIC | Age: 64
End: 2024-01-22
Payer: COMMERCIAL

## 2024-01-22 DIAGNOSIS — I50.22 CHRONIC SYSTOLIC CONGESTIVE HEART FAILURE (H): Primary | ICD-10-CM

## 2024-01-22 DIAGNOSIS — Z95.811 LEFT VENTRICULAR ASSIST DEVICE PRESENT (H): ICD-10-CM

## 2024-01-22 DIAGNOSIS — Z95.811 LVAD (LEFT VENTRICULAR ASSIST DEVICE) PRESENT (H): ICD-10-CM

## 2024-01-22 DIAGNOSIS — I50.20 HEART FAILURE WITH REDUCED EJECTION FRACTION, NYHA CLASS III (H): ICD-10-CM

## 2024-01-22 LAB — INR (EXTERNAL): 2 (ref 0.9–1.1)

## 2024-01-22 NOTE — TELEPHONE ENCOUNTER
ANTICOAGULATION  MANAGEMENT: Discharge Review    Dandy Sands chart reviewed for anticoagulation continuity of care    Hospital Admission on 1/19/24-1/22/24 at [facility] in [place] for weakness.1/19 admitted for recurrent drive line infection. Patient does OP PT and OP antibiotic infusions at [facility].     Per ID recommendation: No documented fevers or bacteremia, stop IV Vancomycin and cefepime   Ok to resume outpatient suppression regimen and follow up with ID as scheduled     Discharge disposition: Home    Results:              Anticoagulation inpatient management:     more warfarin administered than maintenance regimen (per pharmacy notes-required higher doses of warfarin this admission, likely due to recent decrease in Cipro dose as outpatient)    Anticoagulation discharge instructions:     Warfarin dosing:  warfarin (COUMADIN) 2.5 MG tablet Take 5 mg by mouth daily at supper starting on Tuesday 1/23/24 OR as directed by anticoagulation management service.   Bridging: No   INR goal change: No      Medication changes affecting anticoagulation: No    Additional factors affecting anticoagulation: No     PLAN       Agree with dosing adjustment on discharge (see note above) to 5 mg daily with a recheck INR 1/25/24     Patient not contacted    Anticoagulation Calendar updated    LORENA MOSES RN

## 2024-01-25 ENCOUNTER — CARE COORDINATION (OUTPATIENT)
Dept: CARDIOLOGY | Facility: CLINIC | Age: 64
End: 2024-01-25
Payer: COMMERCIAL

## 2024-01-26 ENCOUNTER — TELEPHONE (OUTPATIENT)
Dept: ANTICOAGULATION | Facility: CLINIC | Age: 64
End: 2024-01-26
Payer: COMMERCIAL

## 2024-01-26 DIAGNOSIS — Z95.811 LEFT VENTRICULAR ASSIST DEVICE PRESENT (H): ICD-10-CM

## 2024-01-26 DIAGNOSIS — I50.20 HEART FAILURE WITH REDUCED EJECTION FRACTION, NYHA CLASS III (H): ICD-10-CM

## 2024-01-26 DIAGNOSIS — I50.22 CHRONIC SYSTOLIC CONGESTIVE HEART FAILURE (H): Primary | ICD-10-CM

## 2024-01-26 DIAGNOSIS — Z95.811 LVAD (LEFT VENTRICULAR ASSIST DEVICE) PRESENT (H): ICD-10-CM

## 2024-01-26 NOTE — TELEPHONE ENCOUNTER
ANTICOAGULATION     Dandy EDUARD Reginoharitha is overdue for an INR check.     Spoke with Dandy and he will get an INR locally on Monday 1/29/24.    Discharged on Antibiotics.  Released from outside hospital  on 1/22/24.  See previous encounter.    Chon Tinsley, RN

## 2024-01-30 ENCOUNTER — MYC MEDICAL ADVICE (OUTPATIENT)
Dept: ANTICOAGULATION | Facility: CLINIC | Age: 64
End: 2024-01-30
Payer: COMMERCIAL

## 2024-02-01 ENCOUNTER — ANTICOAGULATION THERAPY VISIT (OUTPATIENT)
Dept: ANTICOAGULATION | Facility: CLINIC | Age: 64
End: 2024-02-01
Payer: COMMERCIAL

## 2024-02-01 DIAGNOSIS — Z95.811 LEFT VENTRICULAR ASSIST DEVICE PRESENT (H): ICD-10-CM

## 2024-02-01 DIAGNOSIS — Z95.811 LVAD (LEFT VENTRICULAR ASSIST DEVICE) PRESENT (H): ICD-10-CM

## 2024-02-01 DIAGNOSIS — I50.20 HEART FAILURE WITH REDUCED EJECTION FRACTION, NYHA CLASS III (H): ICD-10-CM

## 2024-02-01 DIAGNOSIS — I50.22 CHRONIC SYSTOLIC CONGESTIVE HEART FAILURE (H): Primary | ICD-10-CM

## 2024-02-01 LAB — INR (EXTERNAL): 1.8 (ref 0.9–1.1)

## 2024-02-01 NOTE — PROGRESS NOTES
ANTICOAGULATION MANAGEMENT     Dandy Sands 63 year old male is on warfarin with therapeutic INR result. (Goal INR 2.0-3.0)    Recent labs: (last 7 days)     02/01/24  0000   INR 1.8*       ASSESSMENT     Source(s): Chart Review and Patient/Caregiver Call     Warfarin doses taken: Warfarin taken as instructed  Diet: No new diet changes identified  Medication/supplement changes: None noted  New illness, injury, or hospitalization: No  Signs or symptoms of bleeding or clotting: No  Previous result: Therapeutic last visit; previously outside of goal range  Additional findings: None       PLAN     Recommended plan for no diet, medication or health factor changes affecting INR     Dosing Instructions: Continue your current warfarin dose with next INR in 1 week       Summary  As of 2/1/2024      Full warfarin instructions:  7.5 mg every Thu; 5 mg all other days   Next INR check:  2/8/2024               Telephone call with Dandy who verbalizes understanding and agrees to plan and who agrees to plan and repeated back plan correctly    Patient using outside facility for labs    Education provided:   Taking warfarin: Importance of taking warfarin as instructed  Goal range and lab monitoring: goal range and significance of current result and Importance of therapeutic range    Plan made per [facility] anticoagulation protocol and per LVAD protocol    Holli Silva RN  Anticoagulation Clinic  2/1/2024    _______________________________________________________________________     Anticoagulation Episode Summary       Current INR goal:  2.0-3.0   TTR:  59.1% (11.5 mo)   Target end date:  Indefinite   Send INR reminders to:  ANTICOAG LVAD    Indications    Chronic systolic congestive heart failure (H) [I50.22]  LVAD (left ventricular assist device) present (H) [Z95.811]  Heart failure with reduced ejection fraction  NYHA class III (H) [I50.20]  Left ventricular assist device present (H) [Z95.811]             Comments:  Follow VAD  Anticoag protocol:Yes: HeartMate 3   Bridging: No bridging epistaxis.   Date VAD placed: 7/8/22             Anticoagulation Care Providers       Provider Role Specialty Phone number    Kelly Lora MD Referring Cardiovascular Disease [phone]

## 2024-02-03 NOTE — PROVIDER NOTIFICATION
"Provider notified: Surg Cross Cover  [id number]- \"T Shavon 6B [id number] pt pulled NJ tube out several cm. re-advanced but can we do an x-ray to verify placement? Thanks Shania [id number]\"    X-ray placed and MD placed patient care ok'd using NJ.  " [employer]

## 2024-02-08 ENCOUNTER — MYC MEDICAL ADVICE (OUTPATIENT)
Dept: ANTICOAGULATION | Facility: CLINIC | Age: 64
End: 2024-02-08
Payer: COMMERCIAL

## 2024-02-15 ENCOUNTER — TELEPHONE (OUTPATIENT)
Dept: ANTICOAGULATION | Facility: CLINIC | Age: 64
End: 2024-02-15
Payer: COMMERCIAL

## 2024-02-15 DIAGNOSIS — I50.20 HEART FAILURE WITH REDUCED EJECTION FRACTION, NYHA CLASS III (H): ICD-10-CM

## 2024-02-15 DIAGNOSIS — Z95.811 LVAD (LEFT VENTRICULAR ASSIST DEVICE) PRESENT (H): ICD-10-CM

## 2024-02-15 DIAGNOSIS — Z95.811 LEFT VENTRICULAR ASSIST DEVICE PRESENT (H): ICD-10-CM

## 2024-02-15 DIAGNOSIS — I50.22 CHRONIC SYSTOLIC CONGESTIVE HEART FAILURE (H): Primary | ICD-10-CM

## 2024-02-15 NOTE — TELEPHONE ENCOUNTER
2/15/24    Dandy calling, He will go into the lab tomorrow 2/15/24.  [place]        ANTICOAGULATION     Dandy EDUARD Reginoharitha is overdue for an INR check.     Left message for patient to call and schedule lab appointment as soon as possible. If returning call, please schedule.     Holli Silva RN

## 2024-02-16 ENCOUNTER — ANTICOAGULATION THERAPY VISIT (OUTPATIENT)
Dept: ANTICOAGULATION | Facility: CLINIC | Age: 64
End: 2024-02-16
Payer: COMMERCIAL

## 2024-02-16 DIAGNOSIS — I50.20 HEART FAILURE WITH REDUCED EJECTION FRACTION, NYHA CLASS III (H): ICD-10-CM

## 2024-02-16 DIAGNOSIS — Z95.811 LEFT VENTRICULAR ASSIST DEVICE PRESENT (H): ICD-10-CM

## 2024-02-16 DIAGNOSIS — I50.22 CHRONIC SYSTOLIC CONGESTIVE HEART FAILURE (H): Primary | ICD-10-CM

## 2024-02-16 DIAGNOSIS — Z95.811 LVAD (LEFT VENTRICULAR ASSIST DEVICE) PRESENT (H): ICD-10-CM

## 2024-02-16 LAB — INR (EXTERNAL): 2.2 (ref 0.9–1.1)

## 2024-02-16 NOTE — PROGRESS NOTES
ANTICOAGULATION MANAGEMENT     Dandy Sands 63 year old male is on warfarin with therapeutic INR result. (Goal INR 2.0-3.0)    Recent labs: (last 7 days)     02/16/24  1137   INR 2.2*       ASSESSMENT     Source(s): Chart Review and Patient/Caregiver Call     Warfarin doses taken: Warfarin taken as instructed  Diet: No new diet changes identified  Medication/supplement changes:  Dalvance infusion every 2 weeks  New illness, injury, or hospitalization: Yes: chronic DLES infection  Signs or symptoms of bleeding or clotting: No  Previous result: Subtherapeutic  Additional findings: None       PLAN     Recommended plan for no diet, medication or health factor changes affecting INR     Dosing Instructions: Continue your current warfarin dose with next INR in 2 weeks       Summary  As of 2/16/2024      Full warfarin instructions:  7.5 mg every Thu; 5 mg all other days   Next INR check:  3/1/2024               Telephone call with Dandy who verbalizes understanding and agrees to plan and who agrees to plan and repeated back plan correctly    Patient using outside facility for labs    Education provided:   Taking warfarin: purpose of warfarin and how it works, take warfarin at same time each day; preferably in the evening, and prescribed tablet strength and color  Goal range and lab monitoring: goal range and significance of current result, Importance of therapeutic range, and Importance of following up at instructed interval  Interaction IS NOT anticipated between warfarin and dalbavancin (DALVANCE)   Symptom monitoring: monitoring for bleeding signs and symptoms and monitoring for clotting signs and symptoms  Importance of notifying anticoagulation clinic for: changes in medications; a sooner lab recheck maybe needed, diarrhea, nausea/vomiting, reduced intake, cold/flu, and/or infections; a sooner lab recheck maybe needed, and if you did not receive dosing instructions on the same day as your labs were checked    Plan made  per [facility] anticoagulation protocol and per LVAD protocol    Chon Tinsley, RN  Anticoagulation Clinic  2/16/2024    _______________________________________________________________________     Anticoagulation Episode Summary       Current INR goal:  2.0-3.0   TTR:  58.0% (11.5 mo)   Target end date:  Indefinite   Send INR reminders to:  ANTICOAG LVAD    Indications    Chronic systolic congestive heart failure (H) [I50.22]  LVAD (left ventricular assist device) present (H) [Z95.811]  Heart failure with reduced ejection fraction  NYHA class III (H) [I50.20]  Left ventricular assist device present (H) [Z95.811]             Comments:  Follow VAD Anticoag protocol:Yes: HeartMate 3   Bridging: No bridging epistaxis.   Date VAD placed: 7/8/22             Anticoagulation Care Providers       Provider Role Specialty Phone number    Kelly Lora MD Referring Cardiovascular Disease [phone]

## 2024-03-01 ENCOUNTER — MYC MEDICAL ADVICE (OUTPATIENT)
Dept: ANTICOAGULATION | Facility: CLINIC | Age: 64
End: 2024-03-01
Payer: COMMERCIAL

## 2024-03-01 ENCOUNTER — ANTICOAGULATION THERAPY VISIT (OUTPATIENT)
Dept: ANTICOAGULATION | Facility: CLINIC | Age: 64
End: 2024-03-01
Payer: COMMERCIAL

## 2024-03-01 DIAGNOSIS — I50.20 HEART FAILURE WITH REDUCED EJECTION FRACTION, NYHA CLASS III (H): ICD-10-CM

## 2024-03-01 DIAGNOSIS — Z95.811 LVAD (LEFT VENTRICULAR ASSIST DEVICE) PRESENT (H): ICD-10-CM

## 2024-03-01 DIAGNOSIS — Z95.811 LEFT VENTRICULAR ASSIST DEVICE PRESENT (H): ICD-10-CM

## 2024-03-01 DIAGNOSIS — I50.22 CHRONIC SYSTOLIC CONGESTIVE HEART FAILURE (H): Primary | ICD-10-CM

## 2024-03-01 LAB — INR (EXTERNAL): 2.5 (ref 0.9–1.1)

## 2024-03-01 NOTE — PROGRESS NOTES
ANTICOAGULATION MANAGEMENT     Dandy Sands 63 year old male is on warfarin with therapeutic INR result. (Goal INR 2.0-3.0)    Recent labs: (last 7 days)     03/01/24  0000   INR 2.5*       ASSESSMENT     Source(s): Chart Review     Warfarin doses taken: Reviewed in chart  Diet: No new diet changes identified  Medication/supplement changes: None noted  New illness, injury, or hospitalization: No  Signs or symptoms of bleeding or clotting: No  Previous result: Therapeutic last visit; previously outside of goal range  Additional findings: None       PLAN     Recommended plan for no diet, medication or health factor changes affecting INR     Dosing Instructions: Continue your current warfarin dose with next INR in 2 weeks       Summary  As of 3/1/2024      Full warfarin instructions:  7.5 mg every Thu; 5 mg all other days   Next INR check:  3/15/2024               Detailed voice message left for Dandy with dosing instructions and follow up date.     Patient using outside facility for labs    Education provided:   Please call back if any changes to your diet, medications or how you've been taking warfarin  Contact [phone] with any changes, questions or concerns.     Plan made per [facility] anticoagulation protocol and per LVAD protocol    Holli Silva RN  Anticoagulation Clinic  3/1/2024    _______________________________________________________________________     Anticoagulation Episode Summary       Current INR goal:  2.0-3.0   TTR:  62.0% (11.5 mo)   Target end date:  Indefinite   Send INR reminders to:  ANTICOAG LVAD    Indications    Chronic systolic congestive heart failure (H) [I50.22]  LVAD (left ventricular assist device) present (H) [Z95.811]  Heart failure with reduced ejection fraction  NYHA class III (H) [I50.20]  Left ventricular assist device present (H) [Z95.811]             Comments:  Follow VAD Anticoag protocol:Yes: HeartMate 3   Bridging: No bridging epistaxis.   Date VAD placed: 7/8/22              Anticoagulation Care Providers       Provider Role Specialty Phone number    Kelly Lora MD Referring Cardiovascular Disease [phone]

## 2024-03-07 ENCOUNTER — CARE COORDINATION (OUTPATIENT)
Dept: CARDIOLOGY | Facility: CLINIC | Age: 64
End: 2024-03-07
Payer: COMMERCIAL

## 2024-03-07 DIAGNOSIS — I50.22 CHRONIC SYSTOLIC CONGESTIVE HEART FAILURE (H): Primary | ICD-10-CM

## 2024-03-07 NOTE — PROGRESS NOTES
Situation:   Writer spoke with Patient and son today to discuss weight gain.     Background:  Weight today: 164 lb. Compared to recent values this weight is increased by 3 lbs in one night. He reports feeling his best at 157 lbs.     Assessment:  Symptoms:   Heart failure symptoms: edema in lower extremities, distended abdomen and shortness of breath. He reports his legs feel like lead, and he's been feeling more tired lately.  Symptoms are worsening.   Onset of symptoms: last few days weight has increased    Patient saw PCP on Friday and obtained labs. Results in care everywhere.     Recommendation:  Will discuss with Dr. Lora.  Patient notified to page on-call coordinator if symptoms worsen or with other concerns. Patient verbalized understanding.

## 2024-03-08 RX ORDER — FUROSEMIDE 20 MG
20 TABLET ORAL DAILY
Qty: 30 TABLET | Refills: 3 | Status: SHIPPED | OUTPATIENT
Start: 2024-03-08 | End: 2024-04-23

## 2024-03-08 RX ORDER — POTASSIUM CHLORIDE 1500 MG/1
20 TABLET, EXTENDED RELEASE ORAL DAILY
Qty: 30 TABLET | Refills: 3 | Status: ON HOLD | OUTPATIENT
Start: 2024-03-08 | End: 2024-06-13

## 2024-03-08 NOTE — PROGRESS NOTES
Dr. Lora ordered Lasix 20 mg and Potassium 20 mEq Daily for weight gain.     Called patient to let him know of the orders. Instructed him to weigh himself daily, and call his VAD coordinator on Tuesday with updated weights and symptoms.     If his symptoms continue to worsen, instructed patient to page over the weekend. Patient verbalized understanding of plan.

## 2024-03-18 ENCOUNTER — ANTICOAGULATION THERAPY VISIT (OUTPATIENT)
Dept: ANTICOAGULATION | Facility: CLINIC | Age: 64
End: 2024-03-18
Payer: COMMERCIAL

## 2024-03-18 DIAGNOSIS — Z95.811 LEFT VENTRICULAR ASSIST DEVICE PRESENT (H): ICD-10-CM

## 2024-03-18 DIAGNOSIS — Z95.811 LVAD (LEFT VENTRICULAR ASSIST DEVICE) PRESENT (H): ICD-10-CM

## 2024-03-18 DIAGNOSIS — I50.20 HEART FAILURE WITH REDUCED EJECTION FRACTION, NYHA CLASS III (H): ICD-10-CM

## 2024-03-18 DIAGNOSIS — I50.22 CHRONIC SYSTOLIC CONGESTIVE HEART FAILURE (H): Primary | ICD-10-CM

## 2024-03-18 LAB — INR (EXTERNAL): 2.7 (ref 0.9–1.1)

## 2024-03-18 NOTE — PROGRESS NOTES
ANTICOAGULATION MANAGEMENT     Dandy Sands 63 year old male is on warfarin with therapeutic INR result. (Goal INR 2.0-3.0)    Recent labs: (last 7 days)     03/18/24  0000   INR 2.7*       ASSESSMENT     Source(s): Chart Review and Patient/Caregiver Call     Warfarin doses taken: Warfarin taken as instructed  Diet:  Soft diet was recommended today in the ER. Soft diet for the next 3 days.  Medication/supplement changes: None noted  New illness, injury, or hospitalization: Yes: Patient was in the ER today after biting on his tongue while eating.  Patient had 3 stiches placed today.  No abx were prescribed.   Signs or symptoms of bleeding or clotting: No  Previous result: Therapeutic last 2(+) visits  Additional findings: None       PLAN     Recommended plan for temporary change(s) affecting INR     Dosing Instructions: Continue your current warfarin dose with next INR in 3 days       Summary  As of 3/18/2024      Full warfarin instructions:  7.5 mg every Thu; 5 mg all other days   Next INR check:  3/21/2024               Telephone call with Dadny who verbalizes understanding and agrees to plan and who agrees to plan and repeated back plan correctly    Patient using outside facility for labs    Education provided:   Taking warfarin: Importance of taking warfarin as instructed  Goal range and lab monitoring: goal range and significance of current result and Importance of therapeutic range    Plan made per [facility] anticoagulation protocol and per LVAD protocol    Holli Silva RN  Anticoagulation Clinic  3/18/2024    _______________________________________________________________________     Anticoagulation Episode Summary       Current INR goal:  2.0-3.0   TTR:  64.3% (11.9 mo)   Target end date:  Indefinite   Send INR reminders to:  ANTICOAG LVAD    Indications    Chronic systolic congestive heart failure (H) [I50.22]  LVAD (left ventricular assist device) present (H) [Z95.811]  Heart failure with reduced ejection  fraction  NYHA class III (H) [I50.20]  Left ventricular assist device present (H) [Z95.811]             Comments:  Follow VAD Anticoag protocol:Yes: HeartMate 3   Bridging: No bridging epistaxis.   Date VAD placed: 7/8/22             Anticoagulation Care Providers       Provider Role Specialty Phone number    Kelly Lora MD Referring Cardiovascular Disease [phone]

## 2024-03-21 ENCOUNTER — TELEPHONE (OUTPATIENT)
Dept: ANTICOAGULATION | Facility: CLINIC | Age: 64
End: 2024-03-21
Payer: COMMERCIAL

## 2024-03-21 NOTE — TELEPHONE ENCOUNTER
ANTICOAGULATION     Dandy Sands is overdue for an INR check.     Spoke with Dandy and he will go in on 3/25/24    Holli Silva RN

## 2024-03-28 ENCOUNTER — CARE COORDINATION (OUTPATIENT)
Dept: CARDIOLOGY | Facility: CLINIC | Age: 64
End: 2024-03-28
Payer: COMMERCIAL

## 2024-03-28 NOTE — PROGRESS NOTES
Called to check in on patient after starting Lasix on 03/08--pt did not reach out to update with weights or obtain labs to monitor potassium. Left detailed message, will attempt to reach out via [employer]/attempt to contact again next week.

## 2024-03-29 ENCOUNTER — ANTICOAGULATION THERAPY VISIT (OUTPATIENT)
Dept: ANTICOAGULATION | Facility: CLINIC | Age: 64
End: 2024-03-29
Payer: COMMERCIAL

## 2024-03-29 DIAGNOSIS — I50.20 HEART FAILURE WITH REDUCED EJECTION FRACTION, NYHA CLASS III (H): ICD-10-CM

## 2024-03-29 DIAGNOSIS — Z95.811 LVAD (LEFT VENTRICULAR ASSIST DEVICE) PRESENT (H): ICD-10-CM

## 2024-03-29 DIAGNOSIS — Z95.811 LEFT VENTRICULAR ASSIST DEVICE PRESENT (H): ICD-10-CM

## 2024-03-29 DIAGNOSIS — I50.22 CHRONIC SYSTOLIC CONGESTIVE HEART FAILURE (H): Primary | ICD-10-CM

## 2024-03-29 LAB — INR (EXTERNAL): 2.1 (ref 0.9–1.1)

## 2024-03-29 NOTE — PROGRESS NOTES
ANTICOAGULATION MANAGEMENT     Dandy Sands 63 year old male is on warfarin with therapeutic INR result. (Goal INR 2.0-3.0)    Recent labs: (last 7 days)     03/29/24  0900   INR 2.1*       ASSESSMENT     Source(s): Chart Review and Patient/Caregiver Call     Warfarin doses taken: Missed dose(s) may be affecting INR - missed dose Mon 3/25/24, may explain why patient is on lower end of therapeutic range.  Diet: No new diet changes identified  Medication/supplement changes:  3/25/24 Citrucel increased, no interaction with Warfarin expected.  New illness, injury, or hospitalization: No - states tongue is all healed  Signs or symptoms of bleeding or clotting: Yes: Reports bruising on arms from blood draws and that this is standard for him.  Previous result: Therapeutic last 2(+) visits  Additional findings: None       PLAN     Recommended plan for temporary change(s) affecting INR     Dosing Instructions: Continue your current warfarin dose with next INR in 1-2 weeks - Dandy does not want to check INR until 4/12/24.    Summary  As of 3/29/2024      Full warfarin instructions:  7.5 mg every Thu; 5 mg all other days   Next INR check:  4/12/2024               Telephone call with Dandy who verbalizes understanding and agrees to plan    Patient using outside facility for labs    Education provided:   Taking warfarin: Importance of taking warfarin as instructed  Goal range and lab monitoring: goal range and significance of current result  Interaction IS NOT anticipated between warfarin and Citrucel.  Symptom monitoring: monitoring for bleeding signs and symptoms    Plan made per [facility] anticoagulation protocol and per LVAD protocol    Karen Benjamin RN  Anticoagulation Clinic  3/29/2024    _______________________________________________________________________     Anticoagulation Episode Summary       Current INR goal:  2.0-3.0   TTR:  65.2% (1 y)   Target end date:  Indefinite   Send INR reminders to:  [facility]     Indications    Chronic systolic congestive heart failure (H) [I50.22]  LVAD (left ventricular assist device) present (H) [Z95.811]  Heart failure with reduced ejection fraction  NYHA class III (H) [I50.20]  Left ventricular assist device present (H) [Z95.811]             Comments:  Follow VAD Anticoag protocol:Yes: HeartMate 3   Bridging: No bridging epistaxis.   Date VAD placed: 7/8/22             Anticoagulation Care Providers       Provider Role Specialty Phone number    Kelly Lora MD Referring Cardiovascular Disease [phone]

## 2024-04-12 ENCOUNTER — ANTICOAGULATION THERAPY VISIT (OUTPATIENT)
Dept: ANTICOAGULATION | Facility: CLINIC | Age: 64
End: 2024-04-12
Payer: COMMERCIAL

## 2024-04-12 ENCOUNTER — TRANSFERRED RECORDS (OUTPATIENT)
Dept: HEALTH INFORMATION MANAGEMENT | Facility: CLINIC | Age: 64
End: 2024-04-12
Payer: COMMERCIAL

## 2024-04-12 DIAGNOSIS — I50.20 HEART FAILURE WITH REDUCED EJECTION FRACTION, NYHA CLASS III (H): ICD-10-CM

## 2024-04-12 DIAGNOSIS — Z95.811 LEFT VENTRICULAR ASSIST DEVICE PRESENT (H): ICD-10-CM

## 2024-04-12 DIAGNOSIS — Z95.811 LVAD (LEFT VENTRICULAR ASSIST DEVICE) PRESENT (H): ICD-10-CM

## 2024-04-12 DIAGNOSIS — I50.22 CHRONIC SYSTOLIC CONGESTIVE HEART FAILURE (H): Primary | ICD-10-CM

## 2024-04-12 LAB — INR (EXTERNAL): 2

## 2024-04-12 NOTE — PROGRESS NOTES
ANTICOAGULATION MANAGEMENT     Dandy Sands 63 year old male is on warfarin with therapeutic INR result. (Goal INR 2.0-3.0)    Recent labs: (last 7 days)     04/12/24  0000   INR 2.0       ASSESSMENT     Source(s): Chart Review and Patient/Caregiver Call     Warfarin doses taken: Warfarin taken as instructed  Diet: No new diet changes identified  Medication/supplement changes: None noted  New illness, injury, or hospitalization: No  Signs or symptoms of bleeding or clotting: No  Previous result: Therapeutic last 2(+) visits  Additional findings:  Patient would like his INR more mid range and would like to increase the dose for the next 2 weeks and see what happens.  Since this is reasonable writer is in agreement.        PLAN     Recommended plan for no diet, medication or health factor changes affecting INR     Dosing Instructions: Increase your warfarin dose (6% change) with next INR in 2 weeks       Summary  As of 4/12/2024      Full warfarin instructions:  7.5 mg every Mon, Thu; 5 mg all other days   Next INR check:  4/26/2024               Telephone call with Dandy who verbalizes understanding and agrees to plan and who agrees to plan and repeated back plan correctly    Patient using outside facility for labs    Education provided:   Taking warfarin: Importance of taking warfarin as instructed  Goal range and lab monitoring: goal range and significance of current result and Importance of therapeutic range    Plan made per [facility] anticoagulation protocol and per LVAD protocol    Holli Silva RN  Anticoagulation Clinic  4/12/2024    _______________________________________________________________________     Anticoagulation Episode Summary       Current INR goal:  2.0-3.0   TTR:  65.8% (1 y)   Target end date:  Indefinite   Send INR reminders to:  ANTICOAG LVAD    Indications    Chronic systolic congestive heart failure (H) [I50.22]  LVAD (left ventricular assist device) present (H) [Z95.811]  Heart failure with  reduced ejection fraction  NYHA class III (H) [I50.20]  Left ventricular assist device present (H) [Z95.811]             Comments:  Follow VAD Anticoag protocol:Yes: HeartMate 3   Bridging: No bridging epistaxis.   Date VAD placed: 7/8/22             Anticoagulation Care Providers       Provider Role Specialty Phone number    Kelly Lora MD Referring Cardiovascular Disease [phone]

## 2024-04-23 DIAGNOSIS — I50.22 CHRONIC SYSTOLIC CONGESTIVE HEART FAILURE (H): ICD-10-CM

## 2024-04-23 RX ORDER — FUROSEMIDE 20 MG
20 TABLET ORAL DAILY
Qty: 90 TABLET | Refills: 3 | Status: ON HOLD | OUTPATIENT
Start: 2024-04-23 | End: 2024-06-13

## 2024-04-23 NOTE — TELEPHONE ENCOUNTER
"D:  Received call from pt.  States his lasix ran out last week.  He tried to fill it today but was told by the pharmacy that Dr. Lora did not want him to take it anymore.  MAP today is 102.  Weight is 159 pounds.  Last week his weight was 156 pounds.  He says he has had increased SOB and blurry vision on and off for a week.  States he feels like his \"head is going to pop off when he lays down\".  I:  Reviewed current orders and notes.  Pt is supposed to continue to take lasix 20mg daily.  Refill sent to pharmacy.  Instructed pt to take lasix and potassium daily as previously instructed.  A:  Pt states he didn't know he is supposed to continue taking it.  Asked how he should know when to stop taking it.  I:  Instructed pt to call if weight is less than 154 pounds.  Reminded pt he should not be deciding on his own whether or not to take lasix.  A:  Pt verbalized understanding.  P:  VAD Coordinator available for questions or concerns.  "

## 2024-04-25 LAB — INR (EXTERNAL): 2.2 (ref 0.9–1.1)

## 2024-04-29 ENCOUNTER — CARE COORDINATION (OUTPATIENT)
Dept: CARDIOLOGY | Facility: CLINIC | Age: 64
End: 2024-04-29
Payer: COMMERCIAL

## 2024-04-29 ENCOUNTER — ANTICOAGULATION THERAPY VISIT (OUTPATIENT)
Dept: ANTICOAGULATION | Facility: CLINIC | Age: 64
End: 2024-04-29
Payer: COMMERCIAL

## 2024-04-29 DIAGNOSIS — I50.20 HEART FAILURE WITH REDUCED EJECTION FRACTION, NYHA CLASS III (H): ICD-10-CM

## 2024-04-29 DIAGNOSIS — Z95.811 LEFT VENTRICULAR ASSIST DEVICE PRESENT (H): ICD-10-CM

## 2024-04-29 DIAGNOSIS — I50.22 CHRONIC SYSTOLIC CONGESTIVE HEART FAILURE (H): Primary | ICD-10-CM

## 2024-04-29 DIAGNOSIS — Z95.811 LVAD (LEFT VENTRICULAR ASSIST DEVICE) PRESENT (H): ICD-10-CM

## 2024-04-29 NOTE — PROGRESS NOTES
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     Writer reviewed:  Brief background of what EOGO is.  The rate of reported cases and deaths associated with EOGO (0.24% @ 2 years, 2.06% @ 5 years, 273 injuries, and 14 deaths).  Their pump does not need to be replaced, per VAD team, FDA, and industry. There is no indication to stop implanting Heartmate 3 pumps.  The VAD team monitors for EOGO in clinic visits, assessing VAD parameters and with routine testing. It is a treatable condition and we have successfully managed it.    Low flows are the most common sign of EOGO. Patient does not report any persistent changes in flow. Reviewed standard instructions that patient should assess and document VAD parameters once daily. They should notify VAD team for persistent flow changes of 2 or more and/or new/unexpected low flow alarms, or if they generally feel unwell.   The [facility] is also sending a letter to review this information. We will keep them informed of any new information or changes.   They should page the on-call VAD Coordinator with future questions or concerns.     Patient verbalized understanding of the above.

## 2024-04-29 NOTE — PROGRESS NOTES
ANTICOAGULATION MANAGEMENT     Dandy Sands 63 year old male is on warfarin with therapeutic INR result. (Goal INR 2.0-3.0)    Recent labs: (last 7 days)     04/25/24  0000   INR 2.2*       ASSESSMENT     Source(s): Chart Review     Warfarin doses taken: Reviewed in chart  Diet: No new diet changes identified  Medication/supplement changes: None noted  New illness, injury, or hospitalization: No  Signs or symptoms of bleeding or clotting: No  Previous result: Therapeutic last 2(+) visits  Additional findings: None       PLAN     Recommended plan for no diet, medication or health factor changes affecting INR     Dosing Instructions: Continue your current warfarin dose with next INR in 2 weeks       Summary  As of 4/29/2024      Full warfarin instructions:  7.5 mg every Mon, Thu; 5 mg all other days   Next INR check:  5/9/2024               Detailed voice message left for Dandy with dosing instructions and follow up date.     Patient using outside facility for labs    Education provided:   Please call back if any changes to your diet, medications or how you've been taking warfarin  Contact [phone] with any changes, questions or concerns.     Plan made per [facility] anticoagulation protocol and per LVAD protocol    Holli Silva RN  Anticoagulation Clinic  4/29/2024    _______________________________________________________________________     Anticoagulation Episode Summary       Current INR goal:  2.0-3.0   TTR:  65.5% (1 y)   Target end date:  Indefinite   Send INR reminders to:  ANTICOAG LVAD    Indications    Chronic systolic congestive heart failure (H) [I50.22]  LVAD (left ventricular assist device) present (H) [Z95.811]  Heart failure with reduced ejection fraction  NYHA class III (H) [I50.20]  Left ventricular assist device present (H) [Z95.811]             Comments:  Follow VAD Anticoag protocol:Yes: HeartMate 3   Bridging: No bridging epistaxis.   Date VAD placed: 7/8/22             Anticoagulation Care  Providers       Provider Role Specialty Phone number    Kelly Lora MD Referring Cardiovascular Disease [phone]

## 2024-05-09 ENCOUNTER — CARE COORDINATION (OUTPATIENT)
Dept: CARDIOLOGY | Facility: CLINIC | Age: 64
End: 2024-05-09
Payer: COMMERCIAL

## 2024-05-09 NOTE — PROGRESS NOTES
"Spoke with pt in response to DLES concerns expressed through [employer] message.     Per pt, site has changed and looks \"yucky\". Pt endorses some new redness, but denies any new drainage, swelling, pain, or fever. No abnormal nodules on palpation per pt. Upon reviewing submitted photo, noted that pt has a small portion of gortex on his external abdomen. Asked if pt has always had the gortex externally presented; pt is unsure, but notes that it \"slides in and out\" of his body. Pt denies any recent possible driveline trauma.    Informed patient that skin seal around his driveline can sometimes be compromised after abdominal weight loss, as pt has recently lost 7 pounds after starting PO diuretics. Asked if patient anchors his driveline; pt notes that he \"tucks the controller\" in his pants and \"it works just fine\". Educated patient that properly anchoring his driveline is crucial to promotion of skin seal formation and prevention of accidental driveline trauma. Pt notes refusal to try a new anchor, citing sensitive skin after three days no matter what type of anchor used.     Pt saw his local ID clinic last week; recommended that he contact his ID clinic triage line for further recommendations. Instructed pt to report any new or worsening s/s of DLES infection to the VAD coordinator on call. Pt expressed understanding of recommendations.  "

## 2024-05-10 ENCOUNTER — TRANSFERRED RECORDS (OUTPATIENT)
Dept: HEALTH INFORMATION MANAGEMENT | Facility: CLINIC | Age: 64
End: 2024-05-10
Payer: COMMERCIAL

## 2024-05-10 LAB — INR (EXTERNAL): 2 (ref 0.9–1.1)

## 2024-05-14 ENCOUNTER — ANTICOAGULATION THERAPY VISIT (OUTPATIENT)
Dept: ANTICOAGULATION | Facility: CLINIC | Age: 64
End: 2024-05-14
Payer: COMMERCIAL

## 2024-05-14 DIAGNOSIS — I50.20 HEART FAILURE WITH REDUCED EJECTION FRACTION, NYHA CLASS III (H): ICD-10-CM

## 2024-05-14 DIAGNOSIS — I50.22 CHRONIC SYSTOLIC CONGESTIVE HEART FAILURE (H): Primary | ICD-10-CM

## 2024-05-14 DIAGNOSIS — Z95.811 LEFT VENTRICULAR ASSIST DEVICE PRESENT (H): ICD-10-CM

## 2024-05-14 DIAGNOSIS — Z95.811 LVAD (LEFT VENTRICULAR ASSIST DEVICE) PRESENT (H): ICD-10-CM

## 2024-05-14 NOTE — PROGRESS NOTES
ANTICOAGULATION MANAGEMENT     Dandy Sands 63 year old male is on warfarin with therapeutic INR result. (Goal INR 2.0-3.0)    Recent labs: (last 7 days)     05/10/24  0000   INR 2.0*       ASSESSMENT     Source(s): Chart Review  Previous INR was Therapeutic last 2(+) visits  Medication, diet, health changes since last INR chart reviewed; none identified         PLAN     Recommended plan for no diet, medication or health factor changes affecting INR     Dosing Instructions: Continue your current warfarin dose with next INR in 1 week       Summary  As of 5/14/2024      Full warfarin instructions:  7.5 mg every Mon, Thu; 5 mg all other days   Next INR check:  5/17/2024               Detailed voice message left for Dandy with dosing instructions and follow up date.     Patient using outside facility for labs    Education provided:   Please call back if any changes to your diet, medications or how you've been taking warfarin    Plan made per [facility] anticoagulation protocol and per LVAD protocol    Chon Tinsley, RN  Anticoagulation Clinic  5/14/2024    _______________________________________________________________________     Anticoagulation Episode Summary       Current INR goal:  2.0-3.0   TTR:  65.5% (1 y)   Target end date:  Indefinite   Send INR reminders to:  ANTICOAG LVAD    Indications    Chronic systolic congestive heart failure (H) [I50.22]  LVAD (left ventricular assist device) present (H) [Z95.811]  Heart failure with reduced ejection fraction  NYHA class III (H) [I50.20]  Left ventricular assist device present (H) [Z95.811]             Comments:  Follow VAD Anticoag protocol:Yes: HeartMate 3   Bridging: No bridging epistaxis.   Date VAD placed: 7/8/22             Anticoagulation Care Providers       Provider Role Specialty Phone number    Kelly Lora MD Referring Cardiovascular Disease [phone]

## 2024-05-21 ENCOUNTER — MYC MEDICAL ADVICE (OUTPATIENT)
Dept: ANTICOAGULATION | Facility: CLINIC | Age: 64
End: 2024-05-21
Payer: COMMERCIAL

## 2024-05-22 LAB — INR (EXTERNAL): 2.4 (ref 0.9–1.1)

## 2024-05-23 ENCOUNTER — CARE COORDINATION (OUTPATIENT)
Dept: CARDIOLOGY | Facility: CLINIC | Age: 64
End: 2024-05-23
Payer: COMMERCIAL

## 2024-05-23 LAB — INR (EXTERNAL): 2.3 (ref 0.9–1.1)

## 2024-05-23 NOTE — PROGRESS NOTES
D:  Received call from LAURA at [facility].  Pt admitted for diarrhea, dehydration, concerns for sepsis.  No VAD related concerns.  P:  Update to primary Cardiologist and VAD Coordinator.

## 2024-05-24 ENCOUNTER — CARE COORDINATION (OUTPATIENT)
Dept: CARDIOLOGY | Facility: CLINIC | Age: 64
End: 2024-05-24
Payer: COMMERCIAL

## 2024-05-24 LAB — INR (EXTERNAL): 2.3 (ref 0.9–1.1)

## 2024-05-24 NOTE — PROGRESS NOTES
Attempted to contact patient to check-in s/p admission for GI issues/dehydration. Will follow up with inpatient care staff for status update.

## 2024-05-25 LAB — INR (EXTERNAL): 3 (ref 0.9–1.1)

## 2024-05-26 ENCOUNTER — CARE COORDINATION (OUTPATIENT)
Dept: CARDIOLOGY | Facility: CLINIC | Age: 64
End: 2024-05-26
Payer: COMMERCIAL

## 2024-05-26 LAB — INR (EXTERNAL): 4 (ref 0.9–1.1)

## 2024-05-26 NOTE — PROGRESS NOTES
Patient and family each called regarding his care at [facility].     They report patient is admitted for GI symptoms and dehydration, however the last 2 days patient has become hypertensive with MAPs 100+. Son Amos reports his abdomen is distended, his neck veins are dilated and Dandy reports a constant headache.     Instructed Amos and Dandy to remind care team of his blood pressure parameters and concerns for hypervolemia. Also encouraged them to provide number to VAD coordinator pager to cardiology if they should have any questions.      This morning, Amos paged who states Dandy received a dose of diuretic last evening with immediate relief of headache and felt to be less hypervolemic, however this morning, Amos reports Dandy is experiencing the symptoms again. Per most recent cardiology note on 5/25, plan for BID lasix starting 5/26.     Writer contacted [facility] [place] [facility] who states the cardiology fellow will be rounding on Dandy today. Provided fellow contact information to patient placement if he should have any questions for [facility] cardiologist.     Will continue to monitor and follow up as needed.

## 2024-05-27 LAB — INR (EXTERNAL): 3.7 (ref 0.9–1.1)

## 2024-05-28 ENCOUNTER — TELEPHONE (OUTPATIENT)
Dept: ANTICOAGULATION | Facility: CLINIC | Age: 64
End: 2024-05-28
Payer: COMMERCIAL

## 2024-05-28 DIAGNOSIS — I50.22 CHRONIC SYSTOLIC CONGESTIVE HEART FAILURE (H): Primary | ICD-10-CM

## 2024-05-28 DIAGNOSIS — I50.20 HEART FAILURE WITH REDUCED EJECTION FRACTION, NYHA CLASS III (H): ICD-10-CM

## 2024-05-28 DIAGNOSIS — Z95.811 LEFT VENTRICULAR ASSIST DEVICE PRESENT (H): ICD-10-CM

## 2024-05-28 DIAGNOSIS — Z95.811 LVAD (LEFT VENTRICULAR ASSIST DEVICE) PRESENT (H): ICD-10-CM

## 2024-05-28 LAB — INR (EXTERNAL): 3 (ref 0.9–1.1)

## 2024-05-28 NOTE — TELEPHONE ENCOUNTER
Recommendations:  DC cefepime  Discussed oral antimicrobial therapy with TMP/SMX and fluconazole with patient and his daughter at bedside.  Discussed concern for drug interaction with warfarin between the 2 antimicrobials and increased risk for bleeding.  Patient does not have alternate options for long-term suppression for Serratia due to resistance to ciprofloxacin.  Patient and daughter expressed their understanding and are okay with frequent monitoring of PT and INR if needed.  Also discussed side effects of TMP/SMX with concern for elevation in potassium and creatinine.  Patient's daughter expressed her understanding and they are willing to monitor blood work.  Start patient on TMP/SMX 1 double strength p.o. twice daily and fluconazole 400 mg p.o. daily.  Will consider 2 weeks of therapy followed by prolonged suppression with TMP/SMX likely at a lower dose.  Stop date for fluconazole is TBD.  Patient was advised to stop the outpatient chronic suppression ciprofloxacin after discharge.  He will resume dalbavancin every 2 weeks for now for discharge.  He will discuss with this provider at [facility] regarding stopping dalbavancin since he has not grown any gram-positive pathogens lately.

## 2024-05-28 NOTE — TELEPHONE ENCOUNTER
"ANTICOAGULATION MANAGEMENT     Dandy Sands 63 year old male is on warfarin with therapeutic INR result. (Goal INR 2.0-3.0)    Recent labs: (last 7 days)     05/28/24  1045   INR 3.0*       ASSESSMENT     Source(s): Chart Review and Patient/Caregiver Call     Warfarin doses taken: While hospitalized on 5/22 to 5/28: anticoagulation calendar updated with inpatient dosing.  Discharged on: decrease dose to 1.25mg one time 5/28, then 2.5mg daily and Next INR 5/31/24 (patient declined writer recommendation to test in 1 day)  Diet:  Recent hospitalization may be affecting diet and INR  Medication/supplement changes:  Fluconazole started on 5/27/24 subsequent INRs may be increased. Closer INR monitoring recommended. and With Fluconazole courses > 3 days ACC protocol Appendix G recommends empiric reduction of warfarin dose in therapeutic/supratherapeutic patients.  Bactrim started on 5/27/24 subsequent INRs may be increased. Closer INR monitoring recommended. and With Sulfamethoxazole-Trimethoprim, ACC protocol Appendix G recommends empiric reduction of warfarin dose in therapeutic/supratherapeutic patients.  Cipro dose increased on 5/24 No interaction anticipated  New illness, injury, or hospitalization: Yes: Hospitalized for diarrhea, dehydration, concerns for sepsis.  Per Dandy, he is still having loose stools (Side effects from antibiotics)  Signs or symptoms of bleeding or clotting: No  Previous result: Supratherapeutic  Additional findings:  Consulted [facility] for medication changes, and recent hospitalization.  ++Patient declined writer recommendation to test an INR on 5/29/24++ \"I just wanna stay home for a couple days\".       PLAN     Recommended plan for ongoing change(s) affecting INR     Dosing Instructions: Adjust warfarin dose during interaction to 1.25mg one time 5/28, then 2.5mg daily. Maintenance dose modified on anticoagulation calendar for interaction > 7 days; maintenance dose to be readjusted " post interaction. with next INR in 3 days       Summary  As of 5/28/2024      Full warfarin instructions:  5/28: 1.25 mg; Otherwise 2.5 mg every day   Next INR check:  5/31/2024               Telephone call with Dandy who verbalizes understanding and agrees to plan  Sent [employer] message with dosing and follow up instructions    Patient using outside facility for labs    Education provided:   Please call back if any changes to your diet, medications or how you've been taking warfarin  Taking warfarin: purpose of warfarin and how it works, take warfarin at same time each day; preferably in the evening, prescribed tablet strength and color, importance of following ACC instructions vs instructions on the prescription bottle, and Importance of taking warfarin as instructed  Dietary considerations: importance of consistent vitamin K intake, impact of vitamin K foods on INR, and importance of notifying ACC to changes in diet  Interaction IS anticipated between warfarin and Fluconazole & Bactrim  Symptom monitoring: monitoring for bleeding signs and symptoms, monitoring for clotting signs and symptoms, monitoring for stroke signs and symptoms, when to seek medical attention/emergency care, and if you hit your head or have a bad fall seek emergency care  Importance of notifying anticoagulation clinic for: changes in medications; a sooner lab recheck maybe needed, diarrhea, nausea/vomiting, reduced intake, cold/flu, and/or infections; a sooner lab recheck maybe needed, and if you did not receive dosing instructions on the same day as your labs were checked  Contact [phone] with any changes, questions or concerns.     Plan made with ACC Pharmacist Radha Kelley and per LVAD protocol    Chon Tinsley, RN  Anticoagulation Clinic  5/28/2024    _______________________________________________________________________     Anticoagulation Episode Summary       Current INR goal:  2.0-3.0   TTR:  66.2% (1 y)   Target end date:   Indefinite   Send INR reminders to:  ANTICOAG LVAD    Indications    Chronic systolic congestive heart failure (H) [I50.22]  LVAD (left ventricular assist device) present (H) [Z95.811]  Heart failure with reduced ejection fraction  NYHA class III (H) [I50.20]  Left ventricular assist device present (H) [Z95.811]             Comments:  Follow VAD Anticoag protocol:Yes: HeartMate 3   Bridging: No bridging epistaxis.   Date VAD placed: 7/8/22             Anticoagulation Care Providers       Provider Role Specialty Phone number    Kelly Lora MD Referring Cardiovascular Disease [phone]

## 2024-05-29 ENCOUNTER — MYC MEDICAL ADVICE (OUTPATIENT)
Dept: OTHER | Age: 64
End: 2024-05-29

## 2024-05-29 ENCOUNTER — CARE COORDINATION (OUTPATIENT)
Dept: CARDIOLOGY | Facility: CLINIC | Age: 64
End: 2024-05-29
Payer: COMMERCIAL

## 2024-05-29 DIAGNOSIS — Z95.811 LVAD (LEFT VENTRICULAR ASSIST DEVICE) PRESENT (H): Primary | ICD-10-CM

## 2024-05-29 DIAGNOSIS — I50.22 CHRONIC SYSTOLIC CONGESTIVE HEART FAILURE (H): ICD-10-CM

## 2024-05-29 DIAGNOSIS — T82.7XXA INFECTION ASSOCIATED WITH DRIVELINE OF LEFT VENTRICULAR ASSIST DEVICE (LVAD) (H): ICD-10-CM

## 2024-05-29 NOTE — PROGRESS NOTES
Called patient to check in after being discharged from [place]. Left VM requesting he page the VAD Coordinator on call.     Patient has clinic appointment with Dr. Lora on 6/21

## 2024-05-31 ENCOUNTER — CARE COORDINATION (OUTPATIENT)
Dept: CARDIOLOGY | Facility: CLINIC | Age: 64
End: 2024-05-31
Payer: COMMERCIAL

## 2024-05-31 ENCOUNTER — ANTICOAGULATION THERAPY VISIT (OUTPATIENT)
Dept: ANTICOAGULATION | Facility: CLINIC | Age: 64
End: 2024-05-31
Payer: COMMERCIAL

## 2024-05-31 DIAGNOSIS — Z95.811 LEFT VENTRICULAR ASSIST DEVICE PRESENT (H): ICD-10-CM

## 2024-05-31 DIAGNOSIS — I50.20 HEART FAILURE WITH REDUCED EJECTION FRACTION, NYHA CLASS III (H): ICD-10-CM

## 2024-05-31 DIAGNOSIS — I50.22 CHRONIC SYSTOLIC CONGESTIVE HEART FAILURE (H): Primary | ICD-10-CM

## 2024-05-31 DIAGNOSIS — Z95.811 LVAD (LEFT VENTRICULAR ASSIST DEVICE) PRESENT (H): ICD-10-CM

## 2024-05-31 LAB — INR (EXTERNAL): 3.2 (ref 0.9–1.1)

## 2024-05-31 NOTE — PROGRESS NOTES
Spoke with [facility] [place] ED charge RN to inform them of plan to transfer pt to [facility]. Provided charge RN with phone number for patient placement and directions to initiate patient transfer to Cardiology 2 team. Also provided RN with contact information to page the VAD coordinator on call if needed.

## 2024-05-31 NOTE — PROGRESS NOTES
Update at 3489:     Patient's son, Amos, paged this writer to notify us that they are on the way to the ED in [facility]. Will call  ED to notify them of plan to transfer to [facility] ED for further workup. Dr. Stewart with cardiology 2 notified of patient's impeding arrival.

## 2024-05-31 NOTE — PROGRESS NOTES
"ANTICOAGULATION MANAGEMENT     Dandy Sands 63 year old male is on warfarin with supratherapeutic INR result. (Goal INR 2.0-3.0)    Recent labs: (last 7 days)     05/31/24  1000   INR 3.2*       ASSESSMENT     Source(s): Chart Review and Patient/Caregiver Call     Warfarin doses taken: More warfarin taken than planned which may be affecting INR  Diet:  Poor Appetite may be affecting diet and INR  Medication/supplement changes:  Bactrim, Fluconazole, Cipro and Dalvance continue which may be increasing INR today  New illness, injury, or hospitalization: Yes: DLES infection, concerns for sepsis inpatient.  Released from hospital on 5/28/24.  Per patient he is having severe diarrhea (ongoing)  Signs or symptoms of bleeding or clotting: No  Previous result: Therapeutic last visit; previously outside of goal range  Additional findings:  Consult with [facility] for medication interaction.  Spoke to Dandy, patient Repeated back instructions, writer will send a [employer] Chart message.  Patient son, Amos responded in background that \"he got it down\"[dosing].         PLAN     Recommended plan for ongoing change(s) affecting INR     Dosing Instructions: decrease your warfarin dose (21% change) with next INR in 4 days       Summary  As of 5/31/2024      Full warfarin instructions:  1.25 mg every Sun, Wed, Fri; 2.5 mg all other days   Next INR check:  6/4/2024               Telephone call with Dandy who verbalizes understanding and agrees to plan  Sent [employer] message with dosing and follow up instructions    Patient using outside facility for labs    Education provided:   Please call back if any changes to your diet, medications or how you've been taking warfarin  Taking warfarin: purpose of warfarin and how it works, take warfarin at same time each day; preferably in the evening, and prescribed tablet strength and color  Goal range and lab monitoring: goal range and significance of current result, Importance of therapeutic range, and " Importance of following up at instructed interval  Interaction IS anticipated between warfarin and Antibiotics  Symptom monitoring: monitoring for bleeding signs and symptoms, monitoring for clotting signs and symptoms, monitoring for stroke signs and symptoms, when to seek medical attention/emergency care, and if you hit your head or have a bad fall seek emergency care  Importance of notifying anticoagulation clinic for: changes in medications; a sooner lab recheck maybe needed, diarrhea, nausea/vomiting, reduced intake, cold/flu, and/or infections; a sooner lab recheck maybe needed, and if you did not receive dosing instructions on the same day as your labs were checked  Contact [phone] with any changes, questions or concerns.     Plan made with [facility] Pharmacist Radha Kelley and per LVAD protocol    Chon Tinsley, RN  Anticoagulation Clinic  5/31/2024    _______________________________________________________________________     Anticoagulation Episode Summary       Current INR goal:  2.0-3.0   TTR:  65.8% (1 y)   Target end date:  Indefinite   Send INR reminders to:  ANTICOAG LVAD    Indications    Chronic systolic congestive heart failure (H) [I50.22]  LVAD (left ventricular assist device) present (H) [Z95.811]  Heart failure with reduced ejection fraction  NYHA class III (H) [I50.20]  Left ventricular assist device present (H) [Z95.811]             Comments:  Follow VAD Anticoag protocol:Yes: HeartMate 3   Bridging: No bridging epistaxis.   Date VAD placed: 7/8/22             Anticoagulation Care Providers       Provider Role Specialty Phone number    Kelly Lora MD Referring Cardiovascular Disease [phone]

## 2024-05-31 NOTE — PROGRESS NOTES
D: on-call coordinator paged by Dr. Vernon ([facility]) and Dr. Morin ([facility])     I/A: Wanting to see if there is a way to faciltate a faster transfer for patient from [place] to our hospital.   Discussed with Dr. Stewart, he has accepted patient and just waiting on bed availability. Amos- patient's son has strong wishes to get patient here for care with his recent decline.     P: Discussed option of ED to ED transfer. Provided Dr. Stewart's contact info to cardiologist Dr. Morin if he has further questions.  Caregiver notified to page on-call coordinator if symptoms worsen or with other concerns. Caregiver verbalized understanding.

## 2024-05-31 NOTE — PROGRESS NOTES
"This writer was contacted by pt's son, Amos, with concerns for rapidly declining condition since his father's inpatient discharge.     Patient was discharged from [facility] this past Monday, 05/27. He was initially admitted for nausea/emesis and weakness. 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 Amos notes that patient is so frail that he is unable to ambulate, has poor PO intake, and appears to be declining quickly. He also mentions that patient is now making statements that he would like to \"be done\" and does not want any more surgeries or hospitalizations. Amos is concerned about [place]'s ID providers' ability to manage his infection, and is looking to schedule an ID clinic visit for when he is in town for his cardiology visit.      After reviewing patient's chart and overall clinical picture, recommended that patient present to [facility] for further workup. Amos expressed feeling frustrated, and does not want to bring his father in if they can treat his infection in [place]. Emphasized that any concern for internal driveline infection should be explored by our team here at [facility], and that we have a multidisciplinary team with extensive experience in treating driveline infection. Imaging should be completed w/review by CVTS providers, as well as VAD coordinator assessment of driveline and equipment. Additionally, mentioned that patient is at high risk for falls and subsequent bleeding given change in functional status and anticoagulated state. Recommended admission so that patient can be evaluated by not only infectious disease, but by cardiology, CVTS, palliative care/mental health, and rehabilitation services. Amos inquired about waiting until Monday to present to ED; advised against this given recent positive blood cultures and declining clinical picture. "     Amos would like to drive patient from [place] to [place]. Expressed that my formal recommendation would be that patient present to [facility] ED and transfer to [facility] via medical transport services. Amos will page the VAD coordinator on call with updated plan, as he expressed that he will have difficulty convincing patient. Recommended that Amos page the VAD coordinator on call with updates in situation or any other VAD-related questions or concerns. Amos expressed understanding of recommendations.     Will inform pt's primary cardiologist, Dr. Lora, and Cards 2 attending on service, Dr. Stewart.

## 2024-06-03 ENCOUNTER — CARE COORDINATION (OUTPATIENT)
Dept: CARDIOLOGY | Facility: CLINIC | Age: 64
End: 2024-06-03
Payer: COMMERCIAL

## 2024-06-03 NOTE — PROGRESS NOTES
Patient's son, Amos, called this writer to obtain an update regarding facility transfer from [facility] to [facility]. Amos states that he was told that although there is availability at our facility, there is no ambulance available for transfer until this Wednesday, 6/5/24. Validated Amos's frustrations and reassured him that this writer would escalate transfer as appropriate per our facility's protocols. Informed Amos that this writer or a member of our team would follow up as soon as updates are available.

## 2024-06-05 ENCOUNTER — TELEPHONE (OUTPATIENT)
Dept: INFECTIOUS DISEASES | Facility: CLINIC | Age: 64
End: 2024-06-05
Payer: COMMERCIAL

## 2024-06-05 NOTE — TELEPHONE ENCOUNTER
EP called 6/5 to sched a next avail follow up with LVAD clinic per Stephanie. Pt didn't want to sched and wants to have a nurse call him because he's waiting on a transportation?

## 2024-06-06 NOTE — PROGRESS NOTES
Spoke with Meredith Khan, Cardiology LAURA w/ [facility]. Per provider, pt has been vitally stable w/ no s/s of actively evolving infectious picture. Pt has been refusing PT/OT, and has declined mentioning that he would not like to pursue further treatment. Provider inquiring about pt's reliability as a historian, but also notes that she hears two differing stories from patient and from pt's son, Amos.     Per [facility] PPM, will likely have a bed available for pt tonight pending 6th floor discharges. Communicated this to provider, and agreed that if there is a bed available, pt should transfer to [facility] for further investigation into his functional decline/VAD management/palliative team workup. However, provider cannot justify another night at [place] after tomorrow and will plan to discharge patient home if unable to transfer him. Emphasized importance of discharging patient with supportive therapies, as pt's inability to ambulate and function independently is far from baseline, and this writer worries about falls and/or progression of failure to thrive. Provider agrees to plan.    Will update VAD coordinator on call; per PPM, they will page us when there is a bed available and they will facilitate transfer to [facility]. Attempted to update pt's son via phone call, but left voicemail and sent [employer] message to update him on status.

## 2024-06-07 ENCOUNTER — HOSPITAL ENCOUNTER (INPATIENT)
Facility: CLINIC | Age: 64
LOS: 7 days | Discharge: HOME | DRG: 286 | End: 2024-06-14
Attending: INTERNAL MEDICINE | Admitting: STUDENT IN AN ORGANIZED HEALTH CARE EDUCATION/TRAINING PROGRAM
Payer: COMMERCIAL

## 2024-06-07 ENCOUNTER — APPOINTMENT (OUTPATIENT)
Dept: CARDIOLOGY | Facility: CLINIC | Age: 64
DRG: 286 | End: 2024-06-07
Attending: STUDENT IN AN ORGANIZED HEALTH CARE EDUCATION/TRAINING PROGRAM
Payer: COMMERCIAL

## 2024-06-07 ENCOUNTER — APPOINTMENT (OUTPATIENT)
Dept: ULTRASOUND IMAGING | Facility: CLINIC | Age: 64
DRG: 286 | End: 2024-06-07
Attending: STUDENT IN AN ORGANIZED HEALTH CARE EDUCATION/TRAINING PROGRAM
Payer: COMMERCIAL

## 2024-06-07 DIAGNOSIS — T82.7XXA INFECTION ASSOCIATED WITH DRIVELINE OF LEFT VENTRICULAR ASSIST DEVICE (LVAD) (H): ICD-10-CM

## 2024-06-07 DIAGNOSIS — Z95.811 LEFT VENTRICULAR ASSIST DEVICE PRESENT (H): Primary | ICD-10-CM

## 2024-06-07 DIAGNOSIS — M32.9 SYSTEMIC LUPUS ERYTHEMATOSUS, UNSPECIFIED SLE TYPE, UNSPECIFIED ORGAN INVOLVEMENT STATUS (H): ICD-10-CM

## 2024-06-07 DIAGNOSIS — B99.9 INFECTION: ICD-10-CM

## 2024-06-07 DIAGNOSIS — I50.22 CHRONIC SYSTOLIC CONGESTIVE HEART FAILURE (H): ICD-10-CM

## 2024-06-07 DIAGNOSIS — I50.20 HEART FAILURE WITH REDUCED EJECTION FRACTION, NYHA CLASS III (H): ICD-10-CM

## 2024-06-07 DIAGNOSIS — N40.1 BENIGN PROSTATIC HYPERPLASIA WITH LOWER URINARY TRACT SYMPTOMS, SYMPTOM DETAILS UNSPECIFIED: ICD-10-CM

## 2024-06-07 DIAGNOSIS — Z95.811 LVAD (LEFT VENTRICULAR ASSIST DEVICE) PRESENT (H): ICD-10-CM

## 2024-06-07 LAB
ADV 40+41 DNA STL QL NAA+NON-PROBE: NEGATIVE
ALBUMIN SERPL BCG-MCNC: 4.3 G/DL (ref 3.5–5.2)
ALP SERPL-CCNC: 237 U/L (ref 40–150)
ALT SERPL W P-5'-P-CCNC: 317 U/L (ref 0–70)
ANION GAP SERPL CALCULATED.3IONS-SCNC: 13 MMOL/L (ref 7–15)
ANION GAP SERPL CALCULATED.3IONS-SCNC: 13 MMOL/L (ref 7–15)
APTT PPP: 45 SECONDS (ref 22–38)
AST SERPL W P-5'-P-CCNC: 243 U/L (ref 0–45)
ASTRO TYP 1-8 RNA STL QL NAA+NON-PROBE: NEGATIVE
BASOPHILS # BLD AUTO: 0.1 10E3/UL (ref 0–0.2)
BASOPHILS NFR BLD AUTO: 1 %
BILIRUB SERPL-MCNC: 0.9 MG/DL
BUN SERPL-MCNC: 28.5 MG/DL (ref 8–23)
BUN SERPL-MCNC: 31.9 MG/DL (ref 8–23)
C CAYETANENSIS DNA STL QL NAA+NON-PROBE: NEGATIVE
CA-I BLD-MCNC: 4.7 MG/DL (ref 4.4–5.2)
CALCIUM SERPL-MCNC: 9.4 MG/DL (ref 8.8–10.2)
CALCIUM SERPL-MCNC: 9.4 MG/DL (ref 8.8–10.2)
CAMPYLOBACTER DNA SPEC NAA+PROBE: NEGATIVE
CHLORIDE SERPL-SCNC: 102 MMOL/L (ref 98–107)
CHLORIDE SERPL-SCNC: 103 MMOL/L (ref 98–107)
CK SERPL-CCNC: 34 U/L (ref 39–308)
CREAT SERPL-MCNC: 1.14 MG/DL (ref 0.67–1.17)
CREAT SERPL-MCNC: 1.18 MG/DL (ref 0.67–1.17)
CRYPTOSP DNA STL QL NAA+NON-PROBE: NEGATIVE
DEPRECATED HCO3 PLAS-SCNC: 17 MMOL/L (ref 22–29)
DEPRECATED HCO3 PLAS-SCNC: 18 MMOL/L (ref 22–29)
E COLI O157 DNA STL QL NAA+NON-PROBE: ABNORMAL
E HISTOLYT DNA STL QL NAA+NON-PROBE: NEGATIVE
EAEC ASTA GENE ISLT QL NAA+PROBE: NEGATIVE
EC STX1+STX2 GENES STL QL NAA+NON-PROBE: NEGATIVE
EGFRCR SERPLBLD CKD-EPI 2021: 70 ML/MIN/1.73M2
EGFRCR SERPLBLD CKD-EPI 2021: 73 ML/MIN/1.73M2
EOSINOPHIL # BLD AUTO: 0.2 10E3/UL (ref 0–0.7)
EOSINOPHIL NFR BLD AUTO: 5 %
EPEC EAE GENE STL QL NAA+NON-PROBE: NEGATIVE
ERYTHROCYTE [DISTWIDTH] IN BLOOD BY AUTOMATED COUNT: 22.7 % (ref 10–15)
ERYTHROCYTE [DISTWIDTH] IN BLOOD BY AUTOMATED COUNT: 23 % (ref 10–15)
ERYTHROCYTE [DISTWIDTH] IN BLOOD BY AUTOMATED COUNT: 23.3 % (ref 10–15)
ETEC LTA+ST1A+ST1B TOX ST NAA+NON-PROBE: NEGATIVE
FERRITIN SERPL-MCNC: 107 NG/ML (ref 31–409)
G LAMBLIA DNA STL QL NAA+NON-PROBE: NEGATIVE
GGT SERPL-CCNC: 294 U/L (ref 8–61)
GLUCOSE SERPL-MCNC: 89 MG/DL (ref 70–99)
GLUCOSE SERPL-MCNC: 96 MG/DL (ref 70–99)
HAPTOGLOB SERPL-MCNC: 108 MG/DL (ref 30–200)
HAV IGM SERPL QL IA: NONREACTIVE
HBV CORE IGM SERPL QL IA: NONREACTIVE
HBV SURFACE AG SERPL QL IA: NONREACTIVE
HCT VFR BLD AUTO: 29.8 % (ref 40–53)
HCT VFR BLD AUTO: 30.6 % (ref 40–53)
HCT VFR BLD AUTO: 31.7 % (ref 40–53)
HCV AB SERPL QL IA: NONREACTIVE
HCV AB SERPL QL IA: NONREACTIVE
HGB BLD-MCNC: 9 G/DL (ref 13.3–17.7)
HGB BLD-MCNC: 9.3 G/DL (ref 13.3–17.7)
HGB BLD-MCNC: 9.5 G/DL (ref 13.3–17.7)
IMM GRANULOCYTES # BLD: 0 10E3/UL
IMM GRANULOCYTES NFR BLD: 0 %
INR PPP: 1.86 (ref 0.85–1.15)
INR PPP: 2.03 (ref 0.85–1.15)
IRON BINDING CAPACITY (ROCHE): 340 UG/DL (ref 240–430)
IRON SATN MFR SERPL: 14 % (ref 15–46)
IRON SERPL-MCNC: 48 UG/DL (ref 61–157)
LDH SERPL L TO P-CCNC: 251 U/L (ref 0–250)
LVEF ECHO: NORMAL
LYMPHOCYTES # BLD AUTO: 1 10E3/UL (ref 0.8–5.3)
LYMPHOCYTES NFR BLD AUTO: 18 %
MAGNESIUM SERPL-MCNC: 2.1 MG/DL (ref 1.7–2.3)
MCH RBC QN AUTO: 21.1 PG (ref 26.5–33)
MCH RBC QN AUTO: 21.1 PG (ref 26.5–33)
MCH RBC QN AUTO: 21.4 PG (ref 26.5–33)
MCHC RBC AUTO-ENTMCNC: 30 G/DL (ref 31.5–36.5)
MCHC RBC AUTO-ENTMCNC: 30.2 G/DL (ref 31.5–36.5)
MCHC RBC AUTO-ENTMCNC: 30.4 G/DL (ref 31.5–36.5)
MCV RBC AUTO: 70 FL (ref 78–100)
MCV RBC AUTO: 70 FL (ref 78–100)
MCV RBC AUTO: 71 FL (ref 78–100)
MONOCYTES # BLD AUTO: 0.5 10E3/UL (ref 0–1.3)
MONOCYTES NFR BLD AUTO: 9 %
NEUTROPHILS # BLD AUTO: 3.5 10E3/UL (ref 1.6–8.3)
NEUTROPHILS NFR BLD AUTO: 67 %
NOROVIRUS GI+II RNA STL QL NAA+NON-PROBE: POSITIVE
NRBC # BLD AUTO: 0 10E3/UL
NRBC BLD AUTO-RTO: 0 /100
NT-PROBNP SERPL-MCNC: 999 PG/ML (ref 0–900)
P SHIGELLOIDES DNA STL QL NAA+NON-PROBE: NEGATIVE
PHOSPHATE SERPL-MCNC: 3.7 MG/DL (ref 2.5–4.5)
PLATELET # BLD AUTO: 180 10E3/UL (ref 150–450)
PLATELET # BLD AUTO: 197 10E3/UL (ref 150–450)
PLATELET # BLD AUTO: 201 10E3/UL (ref 150–450)
POTASSIUM SERPL-SCNC: 4.2 MMOL/L (ref 3.4–5.3)
POTASSIUM SERPL-SCNC: 4.4 MMOL/L (ref 3.4–5.3)
PROT SERPL-MCNC: 7.3 G/DL (ref 6.4–8.3)
RBC # BLD AUTO: 4.27 10E6/UL (ref 4.4–5.9)
RBC # BLD AUTO: 4.34 10E6/UL (ref 4.4–5.9)
RBC # BLD AUTO: 4.51 10E6/UL (ref 4.4–5.9)
RETICS # AUTO: 0.05 10E6/UL (ref 0.03–0.1)
RETICS # AUTO: 0.05 10E6/UL (ref 0.03–0.1)
RETICS/RBC NFR AUTO: 1.1 % (ref 0.5–2)
RETICS/RBC NFR AUTO: 1.1 % (ref 0.5–2)
RVA RNA STL QL NAA+NON-PROBE: NEGATIVE
SALMONELLA SP RPOD STL QL NAA+PROBE: NEGATIVE
SAPO I+II+IV+V RNA STL QL NAA+NON-PROBE: NEGATIVE
SHIGELLA SP+EIEC IPAH ST NAA+NON-PROBE: NEGATIVE
SODIUM SERPL-SCNC: 132 MMOL/L (ref 135–145)
SODIUM SERPL-SCNC: 134 MMOL/L (ref 135–145)
URATE SERPL-MCNC: 5.3 MG/DL (ref 3.4–7)
V CHOLERAE DNA SPEC QL NAA+PROBE: NEGATIVE
VIBRIO DNA SPEC NAA+PROBE: NEGATIVE
WBC # BLD AUTO: 4.9 10E3/UL (ref 4–11)
WBC # BLD AUTO: 4.9 10E3/UL (ref 4–11)
WBC # BLD AUTO: 5.3 10E3/UL (ref 4–11)
Y ENTEROCOL DNA STL QL NAA+PROBE: NEGATIVE

## 2024-06-07 PROCEDURE — 250N000013 HC RX MED GY IP 250 OP 250 PS 637

## 2024-06-07 PROCEDURE — 258N000003 HC RX IP 258 OP 636: Mod: JZ

## 2024-06-07 PROCEDURE — 99255 IP/OBS CONSLTJ NEW/EST HI 80: CPT

## 2024-06-07 PROCEDURE — 85027 COMPLETE CBC AUTOMATED: CPT

## 2024-06-07 PROCEDURE — 99223 1ST HOSP IP/OBS HIGH 75: CPT | Performed by: CLINICAL NURSE SPECIALIST

## 2024-06-07 PROCEDURE — 85049 AUTOMATED PLATELET COUNT: CPT

## 2024-06-07 PROCEDURE — 82310 ASSAY OF CALCIUM: CPT

## 2024-06-07 PROCEDURE — 76705 ECHO EXAM OF ABDOMEN: CPT

## 2024-06-07 PROCEDURE — 84302 ASSAY OF SWEAT SODIUM: CPT

## 2024-06-07 PROCEDURE — 99418 PROLNG IP/OBS E/M EA 15 MIN: CPT

## 2024-06-07 PROCEDURE — 84550 ASSAY OF BLOOD/URIC ACID: CPT

## 2024-06-07 PROCEDURE — 85045 AUTOMATED RETICULOCYTE COUNT: CPT

## 2024-06-07 PROCEDURE — 82728 ASSAY OF FERRITIN: CPT

## 2024-06-07 PROCEDURE — 85610 PROTHROMBIN TIME: CPT

## 2024-06-07 PROCEDURE — 85025 COMPLETE CBC W/AUTO DIFF WBC: CPT

## 2024-06-07 PROCEDURE — 93306 TTE W/DOPPLER COMPLETE: CPT

## 2024-06-07 PROCEDURE — 99223 1ST HOSP IP/OBS HIGH 75: CPT | Performed by: PHYSICIAN ASSISTANT

## 2024-06-07 PROCEDURE — 36415 COLL VENOUS BLD VENIPUNCTURE: CPT

## 2024-06-07 PROCEDURE — 83010 ASSAY OF HAPTOGLOBIN QUANT: CPT

## 2024-06-07 PROCEDURE — 80321 ALCOHOLS BIOMARKERS 1OR 2: CPT

## 2024-06-07 PROCEDURE — 85060 BLOOD SMEAR INTERPRETATION: CPT | Performed by: STUDENT IN AN ORGANIZED HEALTH CARE EDUCATION/TRAINING PROGRAM

## 2024-06-07 PROCEDURE — 99222 1ST HOSP IP/OBS MODERATE 55: CPT | Mod: 25 | Performed by: STUDENT IN AN ORGANIZED HEALTH CARE EDUCATION/TRAINING PROGRAM

## 2024-06-07 PROCEDURE — 76705 ECHO EXAM OF ABDOMEN: CPT | Mod: 26 | Performed by: STUDENT IN AN ORGANIZED HEALTH CARE EDUCATION/TRAINING PROGRAM

## 2024-06-07 PROCEDURE — 82550 ASSAY OF CK (CPK): CPT | Performed by: STUDENT IN AN ORGANIZED HEALTH CARE EDUCATION/TRAINING PROGRAM

## 2024-06-07 PROCEDURE — 93750 INTERROGATION VAD IN PERSON: CPT | Performed by: STUDENT IN AN ORGANIZED HEALTH CARE EDUCATION/TRAINING PROGRAM

## 2024-06-07 PROCEDURE — 82977 ASSAY OF GGT: CPT | Performed by: STUDENT IN AN ORGANIZED HEALTH CARE EDUCATION/TRAINING PROGRAM

## 2024-06-07 PROCEDURE — 80069 RENAL FUNCTION PANEL: CPT

## 2024-06-07 PROCEDURE — 83615 LACTATE (LD) (LDH) ENZYME: CPT

## 2024-06-07 PROCEDURE — 83550 IRON BINDING TEST: CPT

## 2024-06-07 PROCEDURE — 85730 THROMBOPLASTIN TIME PARTIAL: CPT

## 2024-06-07 PROCEDURE — 250N000013 HC RX MED GY IP 250 OP 250 PS 637: Performed by: INTERNAL MEDICINE

## 2024-06-07 PROCEDURE — 86015 ACTIN ANTIBODY EACH: CPT

## 2024-06-07 PROCEDURE — 84133 ASSAY OF URINE POTASSIUM: CPT

## 2024-06-07 PROCEDURE — 999N000202 HC STATISTICAL VASC ACCESS NURSE TIME, 1-15 MINUTES

## 2024-06-07 PROCEDURE — 83735 ASSAY OF MAGNESIUM: CPT

## 2024-06-07 PROCEDURE — 93306 TTE W/DOPPLER COMPLETE: CPT | Mod: 26 | Performed by: INTERNAL MEDICINE

## 2024-06-07 PROCEDURE — 83880 ASSAY OF NATRIURETIC PEPTIDE: CPT | Performed by: STUDENT IN AN ORGANIZED HEALTH CARE EDUCATION/TRAINING PROGRAM

## 2024-06-07 PROCEDURE — 83993 ASSAY FOR CALPROTECTIN FECAL: CPT

## 2024-06-07 PROCEDURE — 87507 IADNA-DNA/RNA PROBE TQ 12-25: CPT

## 2024-06-07 PROCEDURE — 87338 HPYLORI STOOL AG IA: CPT

## 2024-06-07 PROCEDURE — 87207 SMEAR SPECIAL STAIN: CPT

## 2024-06-07 PROCEDURE — 82330 ASSAY OF CALCIUM: CPT

## 2024-06-07 PROCEDURE — 80053 COMPREHEN METABOLIC PANEL: CPT

## 2024-06-07 PROCEDURE — 120N000003 HC R&B IMCU UMMC

## 2024-06-07 PROCEDURE — 80074 ACUTE HEPATITIS PANEL: CPT | Performed by: STUDENT IN AN ORGANIZED HEALTH CARE EDUCATION/TRAINING PROGRAM

## 2024-06-07 PROCEDURE — 250N000011 HC RX IP 250 OP 636: Mod: JZ

## 2024-06-07 PROCEDURE — 86803 HEPATITIS C AB TEST: CPT | Performed by: STUDENT IN AN ORGANIZED HEALTH CARE EDUCATION/TRAINING PROGRAM

## 2024-06-07 RX ORDER — FLUCONAZOLE 100 MG/1
400 TABLET ORAL DAILY
Status: DISCONTINUED | OUTPATIENT
Start: 2024-06-07 | End: 2024-06-07

## 2024-06-07 RX ORDER — GABAPENTIN 100 MG/1
100 CAPSULE ORAL 3 TIMES DAILY
Status: DISCONTINUED | OUTPATIENT
Start: 2024-06-07 | End: 2024-06-14 | Stop reason: HOSPADM

## 2024-06-07 RX ORDER — ASPIRIN 81 MG/1
81 TABLET ORAL DAILY
Status: DISCONTINUED | OUTPATIENT
Start: 2024-06-07 | End: 2024-06-14 | Stop reason: HOSPADM

## 2024-06-07 RX ORDER — FERROUS SULFATE 325(65) MG
325 TABLET ORAL EVERY OTHER DAY
Status: DISCONTINUED | OUTPATIENT
Start: 2024-06-07 | End: 2024-06-12

## 2024-06-07 RX ORDER — HYDROXYCHLOROQUINE SULFATE 200 MG/1
200 TABLET, FILM COATED ORAL 2 TIMES DAILY
Status: DISCONTINUED | OUTPATIENT
Start: 2024-06-07 | End: 2024-06-07

## 2024-06-07 RX ORDER — WARFARIN SODIUM 2.5 MG/1
2.5 TABLET ORAL
Status: COMPLETED | OUTPATIENT
Start: 2024-06-07 | End: 2024-06-07

## 2024-06-07 RX ORDER — SULFAMETHOXAZOLE/TRIMETHOPRIM 800-160 MG
1 TABLET ORAL 2 TIMES DAILY
Status: ON HOLD | COMMUNITY
End: 2024-06-13

## 2024-06-07 RX ORDER — HYDROXYZINE HYDROCHLORIDE 10 MG/1
10 TABLET, FILM COATED ORAL
Status: DISCONTINUED | OUTPATIENT
Start: 2024-06-07 | End: 2024-06-14 | Stop reason: HOSPADM

## 2024-06-07 RX ORDER — FUROSEMIDE 20 MG
20 TABLET ORAL DAILY
Status: DISCONTINUED | OUTPATIENT
Start: 2024-06-07 | End: 2024-06-14 | Stop reason: HOSPADM

## 2024-06-07 RX ORDER — MULTIPLE VITAMINS W/ MINERALS TAB 9MG-400MCG
1 TAB ORAL DAILY
Status: DISCONTINUED | OUTPATIENT
Start: 2024-06-07 | End: 2024-06-12

## 2024-06-07 RX ORDER — TAMSULOSIN HYDROCHLORIDE 0.4 MG/1
0.4 CAPSULE ORAL DAILY
Status: DISCONTINUED | OUTPATIENT
Start: 2024-06-07 | End: 2024-06-14 | Stop reason: HOSPADM

## 2024-06-07 RX ORDER — LORAZEPAM 0.5 MG/1
0.5 TABLET ORAL ONCE
Status: COMPLETED | OUTPATIENT
Start: 2024-06-07 | End: 2024-06-07

## 2024-06-07 RX ORDER — POTASSIUM CHLORIDE 750 MG/1
20 TABLET, EXTENDED RELEASE ORAL DAILY
Status: DISCONTINUED | OUTPATIENT
Start: 2024-06-07 | End: 2024-06-14 | Stop reason: HOSPADM

## 2024-06-07 RX ORDER — SULFAMETHOXAZOLE/TRIMETHOPRIM 800-160 MG
1 TABLET ORAL 2 TIMES DAILY
Status: DISCONTINUED | OUTPATIENT
Start: 2024-06-07 | End: 2024-06-12

## 2024-06-07 RX ORDER — ATORVASTATIN CALCIUM 40 MG/1
40 TABLET, FILM COATED ORAL DAILY
Status: DISCONTINUED | OUTPATIENT
Start: 2024-06-07 | End: 2024-06-14 | Stop reason: HOSPADM

## 2024-06-07 RX ORDER — LIDOCAINE 40 MG/G
CREAM TOPICAL
Status: DISCONTINUED | OUTPATIENT
Start: 2024-06-07 | End: 2024-06-14 | Stop reason: HOSPADM

## 2024-06-07 RX ORDER — DIGOXIN 125 MCG
125 TABLET ORAL DAILY
Status: DISCONTINUED | OUTPATIENT
Start: 2024-06-07 | End: 2024-06-07

## 2024-06-07 RX ORDER — PANTOPRAZOLE SODIUM 40 MG/1
40 TABLET, DELAYED RELEASE ORAL
Status: DISCONTINUED | OUTPATIENT
Start: 2024-06-07 | End: 2024-06-12

## 2024-06-07 RX ADMIN — PANTOPRAZOLE SODIUM 40 MG: 40 TABLET, DELAYED RELEASE ORAL at 08:52

## 2024-06-07 RX ADMIN — SULFAMETHOXAZOLE AND TRIMETHOPRIM 1 TABLET: 800; 160 TABLET ORAL at 08:52

## 2024-06-07 RX ADMIN — GABAPENTIN 100 MG: 100 CAPSULE ORAL at 15:44

## 2024-06-07 RX ADMIN — LORAZEPAM 0.5 MG: 0.5 TABLET ORAL at 21:15

## 2024-06-07 RX ADMIN — POTASSIUM CHLORIDE 20 MEQ: 750 TABLET, EXTENDED RELEASE ORAL at 08:52

## 2024-06-07 RX ADMIN — ASPIRIN 81 MG: 81 TABLET ORAL at 08:52

## 2024-06-07 RX ADMIN — MICAFUNGIN SODIUM 100 MG: 50 INJECTION, POWDER, LYOPHILIZED, FOR SOLUTION INTRAVENOUS at 06:26

## 2024-06-07 RX ADMIN — GABAPENTIN 100 MG: 100 CAPSULE ORAL at 08:52

## 2024-06-07 RX ADMIN — SULFAMETHOXAZOLE AND TRIMETHOPRIM 1 TABLET: 800; 160 TABLET ORAL at 20:33

## 2024-06-07 RX ADMIN — TAMSULOSIN HYDROCHLORIDE 0.4 MG: 0.4 CAPSULE ORAL at 08:52

## 2024-06-07 RX ADMIN — WARFARIN SODIUM 2.5 MG: 2.5 TABLET ORAL at 17:37

## 2024-06-07 RX ADMIN — FERROUS SULFATE TAB 325 MG (65 MG ELEMENTAL FE) 325 MG: 325 (65 FE) TAB at 08:52

## 2024-06-07 RX ADMIN — GABAPENTIN 100 MG: 100 CAPSULE ORAL at 20:33

## 2024-06-07 RX ADMIN — PANTOPRAZOLE SODIUM 40 MG: 40 TABLET, DELAYED RELEASE ORAL at 15:44

## 2024-06-07 RX ADMIN — Medication 1 TABLET: at 11:23

## 2024-06-07 RX ADMIN — ATORVASTATIN CALCIUM 40 MG: 40 TABLET, FILM COATED ORAL at 08:52

## 2024-06-07 RX ADMIN — FUROSEMIDE 20 MG: 20 TABLET ORAL at 08:52

## 2024-06-07 RX ADMIN — Medication 300 MG: at 15:49

## 2024-06-07 ASSESSMENT — ACTIVITIES OF DAILY LIVING (ADL)
ADLS_ACUITY_SCORE: 47
ADLS_ACUITY_SCORE: 46
ADLS_ACUITY_SCORE: 47
ADLS_ACUITY_SCORE: 46
ADLS_ACUITY_SCORE: 46
ADLS_ACUITY_SCORE: 47
ADLS_ACUITY_SCORE: 46
ADLS_ACUITY_SCORE: 47
ADLS_ACUITY_SCORE: 47
ADLS_ACUITY_SCORE: 46
ADLS_ACUITY_SCORE: 46
ADLS_ACUITY_SCORE: 47
ADLS_ACUITY_SCORE: 46
ADLS_ACUITY_SCORE: 45
ADLS_ACUITY_SCORE: 47
ADLS_ACUITY_SCORE: 46
ADLS_ACUITY_SCORE: 47
ADLS_ACUITY_SCORE: 46

## 2024-06-07 NOTE — PROGRESS NOTES
Palliative, psych consulted, request for RHC and ECHO.   -1 large formed BM this evening, no episodes of diarrhea today  stool sample sent for lab processing. GI and infectious disease also consulted.   -Driveline site without drainage, erythema or s/s of infection.   -Abdominal US performed showing cholelithiasis, no acute cholecystitis. Liver enzymes elevated.    Neuro: A&Ox4. Forgetful. Per daughters report, patient has hx of memory deficits and easily forgetful. Per her report he also reports different stories about his medical care causing discrepancies in what actually occurs.   Cardiac: Afebrile, VSS. VAD HM3 with numbers all WDL. MAP's in 70-80's.    Respiratory: RA   GI/: Voiding spontaneously. No BM this shift.   Diet/appetite: Tolerating 2g na diet. Denies nausea   Activity: Up with stand-by assist    Pain: Denies   Skin: No new deficits noted.  Lines: L SL midline   Drains: none   Replacement: none

## 2024-06-07 NOTE — H&P
[facility]    Cardiology History and Physical - Cardiology         Date of Admission:  6/7/2024    Assessment & Plan: MARISOL Dorman EDUARD Sands is a 63 year old male admitted on 6/7/2024 as a transfer from [place] where he was most recently admitted 05/31/2024 for weakness, malaise, and persistent diarrhea in setting of chronic driveline infection now transferred to [facility] for ongoing ZAKI cares. He has a past medical history significant for ICM s/p HM3 placement 07/08/2022 at [facility], CAD s/p PCI to LAD 2005, in-stent restenosis with TISHA to mLAD and prox RCA 05/24/2022, HLD, c. Diff 09/2022, SLE, antiphospholipid syndrome, chronic anemia, anxiety, and other conditions.     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 At that time they were opting for ongoing treatment with dalbavancin, micafungin, and bactrim, although they were less certain that candida was active contributor vs colonizer. 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 Per review of previous 2023 discharge summary also had 1+ corynebacterium striatum from OR wound cultures at that time. For now will continue per their previous recommendations with daytime request for input from our ID team. Per OSH radiology read 06/03/2024 of CT abdomen/pelvis w/o contrast, no acute abnormalities but with mild ascites.   BC 05/31/2024: negative at [place]  - Please see bottom/data section for copied positive [place] culture results dating back through 2022  P:  - Of note when forumulating plan, patient has significant objections to continuing Dalbavancin as he attributes this to causing his  diarrhea  - Consult to ID  - Continue micafungin 100 mg IV Q24H through 06/12 with consideration of fluconazole for suppression after  - Continue dalbavancin (last dose 05/31)  - Continue Bactrim 800-160 BID through 06/11 then consider suppressive dosing   Somewhat unclear exactly what regimen he has been on over last hospital stay and other recent stay, although not a comprehensive list brief review reveals the following:   Vancomycin (05/23-05/24)  Micafungin (05/24-05/27, 06/02-**)  Fluconazole (05/27-06/01)  Bactrim (05/31-**)  Cefepime (5/23 - 5/27)  Dalbavancin (06/2023-present)    Ciprofloxacain (leading up to 05/23 admission was on 250 mg PO PTA for suppression, unclear duration)     #Ischemic Cardiomyopathy s/p HM3 LVAD 07/08/2022 at [facility]  #S/p CRT-D (2006) s/p RV lead upgrade 1/13/23   s/p LVAD 7/8/12 (Likely medtronic device remains from 03/2006 placement although not certain)   #CAD s/p PCI to LAD 2005, insent restenosis and TISHA mLAD, Prox RCA 05/2022  History of ischemic cardiomyopathy s/p chronic driveline infections as above.   - LVAD RPM set to 5100 at time of admission  - Pharmacy to dose warfarin  - Previously documented INR goal has been 2.0 to 3.0    ACEi/ARB/ARNi: None recently, historically was on lisinopril and hydralazine but these were stopped due to dizziness and low maps  BB: historical documentation reflects post-operative RV failure, hypotension, and low flows resulting in none prescribed  SGLT2i: none currently, previous documentation reflects concern for immunosuppression and concern for infections/unclear evidence in LVAD patients  MRA: none, reportedly due to low maps  Loop Diuretics:   - Continue PTA 20 mg lasix PO daily   - Mag to 2.0, K to 4.0     #Congestive Hepatopathy?  AST/ALT have been upper 100s to 300s since at least 06/01 at [place], results here on admission /. Previously thought related to congestive hepatopathy. Bilirubin WNL, alk phos 237.   -  CTM    #Hemorrhoids  Patient interested in having these surgically fixed if possible as he states the surgeons at [facility] will not do an operation to fix them because he has a LVAD  - Consider surgical consult pending clinical course       #Diarrhea  Patient reports significant history of frequent diarrhea since being on antibiotics for his LVAD, which per his report he has been on nearly since time of placement. His most significant concern and what he finds most puzzling is that it typically goes away in the hospital and returns once home. He believes it may be due to dalbavancin although he did get a dose 05/31 and has not had diarrhea in some time so unclear. This is incredibly life limiting for him as he states he cannot even make it 1 hour without need for BM  - Enteric panel and C. Diff negative 05/23/2024 at [facility]   - CTM  - Consider additional workup for non-infectious causes of diarrhea if returns    #C. Diff  Has had 2 admission for C. Diff 09/2022, negative 05/23/2024.   - CTM    #HLD  - Continue PTA atorvastatin 40 mg PO daily    #SLE, antiphospholipid syndrome  - Continue PTA hydroxychloroquine     #Chronic anemia  Per note review runs 8.0-9.0 chronically  - Sending LDH and haptoglobin along with admission CBC    #Code Status  Discussed this with patient during admission. He quite explicitly does not want chest compressions or to be resuscitated in a cardiac arrest situation, although would be ok with pre-arrest intubation. We discussed at some length how this code status creates a unique situation for him given presence of LVAD and how this would preclude chest compressions and that his ICD would likely shock him out of arrhythmias should they arise. He is accepting of this at the present time and understands that he may get a shock from his ICD. Therefore the most reasonable code status that closely matches his wishes seems to be DNR with an ok for pre-arrest intubation only.   P:   - DNR  - Ok for  pre-arrest intubation  - Understands and accepts he may get shocks from his ICD       Diet: Fluid restriction 2000 ML FLUID  2 Gram Sodium Diet    DVT Prophylaxis: Warfarin  Fitzgerald Catheter: Not present  Cardiac Monitoring: None  Code Status:  DNR, ok for pre-arrest intubation         Clinically Significant Risk Factors Present on Admission               # Drug Induced Coagulation Defect: home medication list includes an anticoagulant medication  # Drug Induced Platelet Defect: home medication list includes an antiplatelet medication   # Hypertension: Home medication list includes antihypertensive(s)  # End stage heart failure: Ventricular assist device (VAD) present                 # ICD device present  # Pacemaker present      Cardiomyopathy        Ascites: Other ascites          Disposition Plan   Expected discharge: 4 - 7 days, recommended to prior living arrangement once  plan established for chronic driveline infection .    Entered: Darvin Santana MD 06/07/2024, 1:46 AM   To be formally staffed in AM      Darvin Santana MD  PGY-2, Internal Medicine     [facility]    ______________________________________________________________________    Chief Complaint   Chronic driveline infection and diarrhea    History is obtained from the patient    History of Present Illness   Dandy Sands is a 63 year old male admitted on 6/7/2024 as a transfer from [place] where he was most recently admitted 05/31/2024 for weakness, malaise, and persistent diarrhea in setting of chronic driveline infection now transferred to [facility] for ongoing ZAKI cares. He has a past medical history significant for ICM s/p HM3 placement 07/08/2022 at [facility], CAD s/p PCI to LAD 2005, in-stent restenosis with TISHA to mLAD and prox RCA 05/24/2022, HLD, c. Diff 09/2022, SLE, antiphospholipid syndrome, chronic anemia, anxiety, and other conditions.       Mr. Gross reports that overall today he is feeling fine.  He  states that his main complaint is the chronic longstanding diarrhea he has which is ruining his quality of life.  He states that he is not able to go out for more than a single hour without having large-volume diarrhea, however he does note that every single time he comes to the hospital this seems to improve.  He believes that the diarrhea is related to dalbavancin as it has been an ongoing challenge ever since he first developed a chronic LVAD infection and was started on this medication.    He otherwise currently denies chest pain lightheadedness dizziness nausea vomiting or recent blood in his stools.  As discussed above, he states that he would not want to have chest compressions understands he has an ICD, so may get shocked, how ever would be okay with a breathing tube and a prearrest situation as well as other ICU level cares. His main goals are to be able to at least go to the store with his family and then out for a pizza or something similar without having it interrupted by diarrhea.     Past Medical History    Past Medical History:   Diagnosis Date    Antiphospholipid antibody syndrome (H24)     CAD (coronary artery disease)     Chronic systolic heart failure (H)     Ischemic cardiomyopathy     Mitral regurgitation     Systemic lupus erythematosus (H)        Past Surgical History   Past Surgical History:   Procedure Laterality Date    COLONOSCOPY N/A 05/23/2022    Procedure: COLONOSCOPY;  Surgeon: Parth Meneses MD;  Location: [facility] OR    CV CORONARY ANGIOGRAM N/A 05/24/2022    Procedure: Coronary Angiogram;  Surgeon: Mike Pope MD;  Location:  HE[facility] CARDIAC CATH LAB    CV CORONARY ANGIOGRAM N/A 05/29/2022    Procedure: Coronary Angiogram;  Surgeon: Austin Bright MD;  Location: [facility] CARDIAC CATH LAB    CV CORONARY LITHOTRIPSY PCI Bilateral 05/24/2022    Procedure: Percutaneous Coronary Intervention - Lithotripsy;  Surgeon: Mike Pope MD;  Location: [facility] CARDIAC CATH  LAB    CV INTRA AORTIC BALLOON N/A 06/17/2022    Procedure: Intraprocedure Aortic Balloon Pump Insertion;  Surgeon: Sherman Cerda MD;  [facility]    CV INTRA AORTIC BALLOON Right 07/03/2022    Procedure: Reposition of Subclavian Intraprocedure Aortic Balloon Pump;  Surgeon: Austin Bright MD;  [facility]    CV INTRAVASULAR ULTRASOUND N/A 05/24/2022    Procedure: Intravascular Ultrasound;  Surgeon: Mike Pope MD;  [facility]    CV PCI ANGIOPLASTY N/A 05/29/2022    Procedure: Percutaneous Transluminal Angioplasty;  Surgeon: Austin Bright MD;  [facility]    CV PCI STENT DRUG ELUTING N/A 05/24/2022    Procedure: Percutaneous Coronary Intervention Stent;  Surgeon: Mike Pope MD;  [facility]    CV RIGHT HEART CATH MEASUREMENTS RECORDED N/A 05/18/2022    Procedure: Right Heart Catheterization;  Surgeon: Justo Stewart MD;  [facility]    CV RIGHT HEART CATH MEASUREMENTS RECORDED N/A 05/24/2022    Procedure: Right Heart Catheterization;  Surgeon: Mike Pope MD;  [facility]    CV RIGHT HEART CATH MEASUREMENTS RECORDED N/A 05/29/2022    Procedure: Right Heart Catheterization;  Surgeon: Austin Bright MD;  [facility]    CV RIGHT HEART CATH MEASUREMENTS RECORDED N/A 07/01/2022    Procedure: Right Heart Catheterization;  Surgeon: Mike Pope MD;  [facility]    CV RIGHT HEART CATH MEASUREMENTS RECORDED N/A 09/23/2022    Procedure: Right Heart Catheterization;  Surgeon: Justo Stewart MD;  [facility]    CV RIGHT HEART EXERCISE STRESS STUDY N/A 05/18/2022    Procedure: Stress Drug Study;  Surgeon: Justo Stewart MD;  [facility]    CV SWAN CHOCO PROCEDURE N/A 06/17/2022    Procedure: [place] Choco Procedure;  Surgeon:  Sherman Cerda MD;  Location:  HEART CARDIAC CATH LAB    EP PACEMAKER OR ICD LEAD IMPLANT-ONE LEAD N/A 01/13/2023    Procedure: CRT-D Lead revision;  Surgeon: Beckie Maurice MD;  Location:  HEART CARDIAC CATH LAB    INCISION AND DRAINAGE CHEST WASHOUT, COMBINED N/A 07/11/2022    Procedure: Chest washout and closure;  Surgeon: Darnell Morgan MD;  [facility]    INCISION AND DRAINAGE CHEST WASHOUT, COMBINED N/A 07/12/2022    Procedure: INCISION AND DRAINAGE, WOUND, CHEST, WITH IRRIGATION;  Surgeon: Darius Zamora MD;  [facility]    INCISION AND DRAINAGE CHEST WASHOUT, COMBINED N/A 03/18/2023    Procedure: Incision and drainage of driveline;  Surgeon: Darius Zamora MD;  [facility]: [facility]    INJECT NERVE OTHER PERIPHERAL Left 01/16/2023    Procedure: Cryoablation of intercostal nerves;  Surgeon: Kenroy Nguyen MD;  [facility]: [facility] GI    INSERT ARTERIAL LINE  07/18/2022         INSERT INTRAAORTIC BALLOON PUMP Right 06/23/2022    Procedure: INSERTION, INTRA-AORTIC BALLOON PUMP RIGHT SUBCLAVIAN ARTERY.;  Surgeon: Hayden Veras MD;  Location: [facility] OR    INSERT VENTRICULAR ASSIST DEVICE LEFT (HEARTMATE II/III) MINIMALLY INVASIVE N/A 07/08/2022    Procedure: MEDIAN STERNOTOMY.  CARDIOPULMONARY BYPASS.  INTRAOPERATIVE TRANSESOPHAGEAL ECHOCARDIOGRAM.  IMPLANT LEFT VENTRICULAR ASSIST DEVICE HEARTMATE III.  PLACEMENT OF PERCUTANEOUS RIGHT VENTRICULAR ASSIST DEVICE.  TEMPORARY CHEST CLOSURE;  Surgeon: Darius Zamora MD;  Location: [facility]    IR CHEST TUBE PLACEMENT NON-TUNNELED RIGHT  08/01/2022    IRRIGATION AND DEBRIDEMENT CHEST WASHOUT, COMBINED N/A 07/13/2022    Procedure: IRRIGATION AND DEBRIDEMENT, CHEST;  Surgeon: Darius Zamora MD;  [facility]    MIDLINE DOUBLE LUMEN PLACEMENT Left 09/11/2022    Mid line ok to use    MIDLINE DOUBLE LUMEN PLACEMENT Right 09/14/2022    5FR DL midline.    PICC DOUBLE LUMEN PLACEMENT Right 05/28/2022    right basilic 5 fr dl picc 40  cm    PICC DOUBLE LUMEN PLACEMENT Right 08/15/2022    Right Lateral, 41 total length, 3 cm external length    PICC SINGLE LUMEN PLACEMENT Right 11/01/2022    41cm (2cm external), Lateral brachial vein, low SVC    PICC SINGLE LUMEN PLACEMENT Right 03/21/2023    Right lateral brachial, 40 cm     Per review of [place] records:   has a past surgical history that includes rhina procedure (12/01/2005); rpr initial inguinal hernia age 6+ reducible; cardiac cath possible angioplasty stent cath lab (12/09/2005); cardiac catheterization (02/13/2006); icd heart fail insync max*nlu* (3/6/06 [facility]); colon polyp bx (01/30/2013); colonoscopy (05/2022); esophagogastroduodenoscopy (03/2021); colonoscopy (05/2021); and upper endoscopy (N/A, 6/16/2023). \\      Prior to Admission Medications   Prior to Admission Medications   Prescriptions Last Dose Informant Patient Reported? Taking?   acetaminophen (TYLENOL) 325 MG tablet   No No   Sig: Take 3 tablets (975 mg) by mouth every 8 hours as needed for mild pain   Patient taking differently: Take 975 mg by mouth every 8 hours as needed for mild pain prn   amoxicillin (AMOXIL) 500 MG capsule   No No   Sig: Take 4 capsules (2,000 mg) by mouth as needed (take 1 hour prior to any dental procedures)   Patient not taking: Reported on 12/14/2023   aspirin (ASA) 81 MG EC tablet   Yes No   Sig: Take 1 tablet (81 mg) by mouth daily   atorvastatin (LIPITOR) 40 MG tablet   No No   Sig: Take 1 tablet (40 mg) by mouth daily   dalbavancin (DALVANCE) 20 mg/mL   Yes No   Sig: Inject into the vein every 14 days   digoxin (LANOXIN) 125 MCG tablet   No No   Sig: Take 1 tablet (125 mcg) by mouth daily   ferrous sulfate (FEROSUL) 325 (65 Fe) MG tablet   No No   Sig: Take 1 tablet (325 mg) by mouth every other day   furosemide (LASIX) 20 MG tablet   No No   Sig: TAKE 1 Tablet BY MOUTH DAILY   gabapentin (NEURONTIN) 100 MG capsule   Yes No   Sig: TAKE 1 Capsule BY MOUTH EVERY MORNING, NOON & BEDTIME    hydroxychloroquine (PLAQUENIL) 200 MG tablet   No No   Sig: Take 1 tablet (200 mg) by mouth 2 times daily   lidocaine (LIDODERM) 5 % patch   No No   Sig: Place 1 patch onto the skin every 24 hours To prevent lidocaine toxicity, patient should be patch free for 12 hrs daily.   Patient not taking: Reported on 12/14/2023   lisinopril (ZESTRIL) 10 MG tablet   No No   Sig: Take 1.5 tabs in the AM and 1 tab in the evening   lisinopril (ZESTRIL) 5 MG tablet   No No   Sig: Take 1 tablet (5 mg) by mouth 2 times daily   loperamide (IMODIUM) 2 MG capsule   No No   Sig: Take 1 capsule (2 mg) by mouth 2 times daily as needed for diarrhea   omeprazole (PRILOSEC) 40 MG DR capsule   Yes No   Sig: Take 40 mg by mouth daily   potassium chloride hector ER (KLOR-CON M20) 20 MEQ CR tablet   No No   Sig: Take 1 tablet (20 mEq) by mouth daily   tamsulosin (FLOMAX) 0.4 MG capsule   No No   Sig: Take 1 capsule (0.4 mg) by mouth daily   warfarin ANTICOAGULANT (COUMADIN) 2 MG tablet   No No   Sig: Take 4 mg PO daily at 6 pm until next INR check, then dose per INR goal 2-3.   warfarin ANTICOAGULANT (COUMADIN) 2.5 MG tablet   No No   Sig: Take 2.5 to 3 tablets daily or as directed by the Coumadin clinic.      Facility-Administered Medications: None        Review of Systems    The 10 point Review of Systems is negative other than noted in the HPI or here.      Physical Exam   Vital Signs: Temp: 97.7  F (36.5  C) Temp src: Oral BP: 91/72 Pulse: 88   Resp: 18 SpO2: 100 % O2 Device: None (Room air)    Weight: 142 lbs 3.2 oz    Constitutional: awake, alert, cooperative, no apparent distress  Eyes: Pupils equal, round and reactive to light, extra ocular muscles intact, sclera clear, conjunctiva normal  ENT: Normocephalic, without obvious abnormality, atraumatic,  Respiratory: No increased work of breathing, good air exchange, clear to auscultation bilaterally, no crackles or wheezing  Cardiovascular: LVAD hum  GI: Normal bowel sounds, soft,  "non-distended, non-tender, no masses palpated  Skin: Bandage covering site of previous sternal wound, patient defers exam to AM during dressing change   Musculoskeletal: Moving all extremities, although does have more difficulty lifting right leg off mattress against resistance compared to left which he reports is baseline.   Neurologic: Awake, alert, appropriately responsive throughout interview.  strength 5/5 bilaterally, lower extremity hip extension 5/5 left and 4/5 right by lifting off mattress, 5/5 flexion bilaterally. Reports that mild strength discrepancy is his baseline and that he ambulates with a \"limp\" at baseline       Medical Decision Making       Please see A&P for additional details of medical decision making.      Data     I have personally reviewed the following data over the past 24 hrs:    4.9  \   9.0 (L)   / 197     132 (L) 102 31.9 (H) /  96   4.2 17 (L) 1.14 \     ALT: 317 (H) AST: 243 (H) AP: 237 (H) TBILI: 0.9   ALB: 4.3 TOT PROTEIN: 7.3 LIPASE: N/A     INR:  1.86 (H) PTT:  45 (H)   D-dimer:  N/A Fibrinogen:  N/A     Ferritin:  N/A % Retic:  N/A LDH:  251 (H)       Imaging results reviewed over the past 24 hrs:   No results found for this or any previous visit (from the past 24 hour(s)).      Specimen: Deep Wnd - Structure of abdominopelvic cavity and/or content of abdominopelvic cavity and/or ante...  Component 2 wk ago Comments   Culture Result Moderate amount Serratia marcescens Abnormal     Culture Result Moderate amount Candida parapsilosis Abnormal  No susceptibility performed   Gram Stain No WBC's seen    Gram Stain Few (1 to 5/OIF) Gram negative bacilli    Gram Stain Rare (less than 1/OIF) Yeast    Resulting Agency [facility] [place]    Susceptibility    Organism Antibiotic Method Susceptibility   Serratia marcescens Cefepime ANIKA 0.25 ug/mL: Sensitive   Serratia marcescens Ciprofloxacin ANIKA 2 ug/mL: Resistant   Serratia marcescens Gentamicin ANIKA <=1 ug/mL: " Sensitive   Serratia marcescens Levofloxacin ANIKA 4 ug/mL: Resistant   Serratia marcescens Piperacillin/Tazobactam. NEWELL SHAFER Sensitive   Serratia marcescens Tobramycin ANIKA <=1 ug/mL: Sensitive   Serratia marcescens Trimethoprim/Sulfamethoxazole ANIKA <=20 ug/mL: Sensitive   Narrative  Performed by [facility]  Drive line  Interpret results with caution. Specimen not properly labeled/identified.  Specimen Collected: 05/23/24  3:32 PM    Performed by: [facility] Last Resulted: 05/26/24 11:50 AM   Received From: [facility]  Result Received: 06/06/24  2:29 PM    View Encounte        Specimen: Fluid - Specimen from abdominal cavity (specimen)  Component 7 mo ago   Culture Result Small amount Serratia marcescens Abnormal    Resulting Agency [facility]   Susceptibility    Organism Antibiotic Method Susceptibility   Serratia marcescens Ceftriaxone ANIKA 1 ug/mL: Sensitive   Serratia marcescens Ciprofloxacin ANIKA 2 ug/mL: Resistant   Serratia marcescens Gentamicin ANIKA <=1 ug/mL: Sensitive   Serratia marcescens Levofloxacin ANIKA 4 ug/mL: Resistant   Serratia marcescens Tobramycin ANIKA <=1 ug/mL: Sensitive   Serratia marcescens Trimethoprim/Sulfamethoxazole ANIKA <=20 ug/mL: Sensitive   Narrative  Performed by [facility]  Right lower abdomen insertion site  Specimen Collected: 10/18/23  9:27 AM    Performed by: [facility] Last Resulted: 10/21/23 12:16 PM   Received From: [facility]  Result Received: 11/09/23  5:57 PM           Specimen: Drainage - Skin structure of chest (body structure)  Component 8 mo ago Comments   Culture Result Large amount Serratia marcescens Abnormal     Culture Result Single [place] Enterococcus faecium, VRE Abnormal  See Comment - Isolate recovered from anaerobic culture.    Consider Infectious Disease consult.   Resulting  Agency [facility] [place] [facility]    Susceptibility    Organism Antibiotic Method Susceptibility   Serratia marcescens Ceftriaxone ANIKA 1 ug/mL: Sensitive   Serratia marcescens Ciprofloxacin ANIKA 2 ug/mL: Resistant   Serratia marcescens Gentamicin ANIKA <=1 ug/mL: Sensitive   Serratia marcescens Levofloxacin ANIKA 4 ug/mL: Resistant   Serratia marcescens Tobramycin ANIKA <=1 ug/mL: Sensitive   Serratia marcescens Trimethoprim/Sulfamethoxazole ANIKA <=20 ug/mL: Sensitive   Enterococcus faecium, VRE Ampicillin ANIKA >=32 ug/mL: Resistant   Enterococcus faecium, VRE Gentamicin High Level Synergy ANIKA SYN-S ug/mL: Sensitive   Enterococcus faecium, VRE Linezolid ANIKA 1 ug/mL: Sensitive   Enterococcus faecium, VRE Penicillin ANIKA >=64 ug/mL: Resistant   Enterococcus faecium, VRE Streptomycin High Level Synergy ANIKA SYN-S ug/mL: Sensitive   Enterococcus faecium, VRE Vancomycin ANIKA >=32 ug/mL: Resistant    Comment: Oral Vancomycin is NEVER a treatment option. Vancomycin is NOT absorbed when orally administered.   Narrative  Performed by [facility]  Drainage from Chronic sternal wound LVAD/Driveline area  Specimen Collected: 10/10/23 12:23 PM    Performed by: [facility] Last Resulted: 10/15/23 10:49 AM   Received From: [facility]  Result Received: 11/09/23  5:57 PM    View Encounter          Specimen: Skin/Superficial Wnd - Specimen from abdominal cavity (specimen)  Component 11 mo ago   Culture Result Moderate amount Serratia marcescens Abnormal    Resulting Agency [facility]   Susceptibility    Organism Antibiotic Method Susceptibility   Serratia marcescens Ceftriaxone ANIKA <=0.25 ug/mL: Sensitive   Serratia marcescens Ciprofloxacin ANIKA <=0.25 ug/mL: Sensitive   Serratia marcescens Gentamicin ANIKA <=1 ug/mL: Sensitive   Serratia marcescens Tobramycin ANIKA <=1 ug/mL: Sensitive   Serratia marcescens  Trimethoprim/Sulfamethoxazole ANIKA <=20 ug/mL: Sensitive   Narrative  Performed by [facility]  Abdominal exit site  Specimen Collected: 07/12/23 10:40 AM    Performed by: [facility] Last Resulted: 07/15/23 11:44 AM   Received From: [facility]  Result Received: 10/12/23  1:45 PM             CULTURE BACTERIAL, URINE  Specimen: Urine - Urine specimen obtained by clean catch procedure (specimen)  Component 1 yr ago Comments   Culture Result 50,000 CFU/mL Staphylococcus epidermidis Abnormal  This is a Coagulase Negative Staphylococcus species.   Resulting Agency [facility]    Susceptibility    Organism Antibiotic Method Susceptibility   Staphylococcus epidermidis Gentamicin ANIKA <=0.5 ug/mL: Sensitive   Staphylococcus epidermidis Nitrofurantoin ANIKA <=16 ug/mL: Sensitive    Comment: Nitrofurantoin should not be used for pyelonephritis due to low renal concentration.   Staphylococcus epidermidis Oxacillin ANIKA <=0.25 ug/mL: Sensitive    Comment: Oxacillin predicts susceptibility to cefazolin, cephalexin, nafcillin and dicloxacillin.   Staphylococcus epidermidis Tetracycline ANIKA >=16 ug/mL: Resistant   Staphylococcus epidermidis Trimethoprim/Sulfamethoxazole ANIKA 80 ug/mL: Resistant   Staphylococcus epidermidis Vancomycin ANIKA 2 ug/mL: Sensitive    Comment: Oral Vancomycin is NEVER a treatment option. Vancomycin is NOT absorbed when orally administered.   Specimen Collected: 05/16/23 10:20 PM Last Resulted: 05/18/23  1:13 PM   Received From: [facility] and [facility]  Result Received: 06/06/23  1:46 PM        Specimen: Blood - Blood specimen (specimen)  Component 1 yr ago Comments   Culture Result Positive for Actinomyces species Abnormal  Identification and susceptibility performed at [facility], [place], [place].  See separate report for final result.  1 of 2 bottles positive for this isolate.   Resulting Agency  [facility]    Narrative  Performed by [facility]    PICC draw    Time to positivity:  59 Hours 23 Minutes.  Specimen Collected: 11/06/22  3:20 AM Last Resulted: 11/16/22  2:11 PM   Received From: [facility] and [facility]  Result Received: 11/21/22 11:19 AM

## 2024-06-07 NOTE — PROGRESS NOTES
CLINICAL NUTRITION SERVICES - ASSESSMENT NOTE     Nutrition Prescription    RECOMMENDATIONS FOR MDs/PROVIDERS TO ORDER:  Encourage PO efforts. Consider nutrition support if unable to tolerate at least 50% of meals or meet ~ 60% of estimated nutrition needs via PO intake    Malnutrition Status:    Severe malnutrition in the context of acute illness/disease    Recommendations already ordered by Registered Dietitian (RD):  Encourage 3 meals/day with protein sources  MVI + minerals daily  Encourage soluble fiber sources(I.e. banana, applesauce, toast, etc.)    Received nutrition consult to educate pt on 2 g sodium diet (automatic check on order set).  Per pt, understands this education, has had previously, and desires no further nutrition education at this time.    Future/Additional Recommendations:  Consider additional Zn supplementation with ongoing diarrhea  Additional snacks/supplements pending PO adequacy/if pt agreeable  Monitor nutrition related findings and follow up per protocol     REASON FOR ASSESSMENT  Dandy Sands is a/an 63 year old male assessed by the dietitian for Provider Order - Nutrition Education - Congestive Heart Failure (CHF) - Dietitian to instruct patient on 2 gram sodium diet    Patient admitted for transfer from [place] where he was most recently admitted 05/31/2024 for weakness, malaise, and persistent diarrhea in setting of chronic driveline infection now transferred to [facility] for ongoing ZAKI cares.     MEDICAL HISTORY  ICM s/p HM3 placement 07/08/2022 at [facility], CAD s/p PCI to LAD 2005, in-stent restenosis with TISHA to mLAD and prox RCA 05/24/2022, HLD, c. Diff 09/2022, SLE, antiphospholipid syndrome, chronic anemia, anxiety, and other conditions.     NUTRITION HISTORY  Met with pt at bedside. Pt reports poor appetite over the last year. Reports intermittent nausea. Diarrhea has been decreasing his quality of life per patient report. Pt believes diarrhea is r/t abx. Denies issues  "chewing/swallowing. No food allergies/intolerances. Endorses wt loss but does not specify amount. Pt educated on low Na diet by RD at [facility] yesterday 6/6/24. Pt reports he tries to monitor salt but sometimes \"cheats.\" Pt reports he does not eat soup or french fries but occasionally will have tacos. Pt declined further education/questions at this time. Written materials provided. Discussed PO efforts for maintaining muscle mass. Pt declined any supplements/snacks at this time. Breakfast tray at bedside, untouched at time of visit. Pt states he would [facility] but has no one who could do this for him.    CURRENT NUTRITION ORDERS  Diet: 2 g Sodium and 2000 mL Fluid Restriction    Intake/Tolerance: No documented intakes to assess.    LABS  Na 134  CO2 18  BUN 28.5  Cre 1.18  Alk phos 237      Hgb 9.3  Hematocrit 30.6    MEDICATIONS  Lipitor  Iron  Lasix  Protonix  Kcl  coumadin    ANTHROPOMETRICS  Height: 170.2 cm (5' 7\")  Most Recent Weight: 64.5 kg (142 lb 3.2 oz)    IBW: 67.3 kg  Body mass index is 22.27 kg/m . BMI Category: Normal BMI  Weight History:  8.1 kg (11%) weight loss over 6 months.  Wt Readings from Last 20 Encounters:   06/07/24 64.5 kg (142 lb 3.2 oz)   12/14/23 72.6 kg (160 lb)   08/24/23 72.6 kg (160 lb)   08/24/23 72.6 kg (160 lb)   08/24/23 67.1 kg (148 lb)   03/25/23 63.6 kg (140 lb 4.8 oz)   03/17/23 69.4 kg (153 lb 1.6 oz)   03/16/23 70.3 kg (155 lb)   01/13/23 64.7 kg (142 lb 11.2 oz)   01/12/23 67.6 kg (149 lb)   01/12/23 67.6 kg (149 lb)   12/12/22 68.7 kg (151 lb 8 oz)   12/12/22 68.8 kg (151 lb 9.6 oz)   11/02/22 62.6 kg (137 lb 14.4 oz)   10/27/22 64.7 kg (142 lb 9.6 oz)   10/25/22 65.8 kg (145 lb)   10/18/22 60.8 kg (134 lb 1.6 oz)   10/07/22 63.6 kg (140 lb 3.2 oz)   09/29/22 60.8 kg (134 lb 0.6 oz)   09/15/22 61.2 kg (134 lb 14.7 oz)         ASSESSED NUTRITION NEEDS  Dosing Weight: 64.5 kg (Admission weight)   Estimated Energy Needs: 3747-8086 " kcals/day (25 - 30 kcals/kg)  Justification: Maintenance  Estimated Protein Needs: 65-77 grams protein/day (1 - 1.2 grams of pro/kg)  Justification: Increased needs  Estimated Fluid Needs: 2000 mL/day (Fluid restriction)   Justification: Per provider pending fluid status    PHYSICAL FINDINGS  See malnutrition section below.    Case Score: 18  Per EMR: Skin  Skin WDL: WDL    MALNUTRITION  % Intake: < 75% for > 7 days (moderate)  % Weight Loss: > 10% in 6 months (severe malnutrition)  Subcutaneous Fat Loss: Facial region:  moderate   Muscle Loss: Thoracic region (clavicle, acromium bone, deltoid, trapezius, pectoral):  moderate  Fluid Accumulation/Edema: None noted  Malnutrition Diagnosis: Severe malnutrition in the context of acute illness/disease    NUTRITION DIAGNOSIS  Inadequate oral intake related to poor appetite/diarrhea as evidenced by patient report      INTERVENTIONS  Implementation  Nutrition Education: will be provided if nutrition education needs arise.  and Nutrition Education: Introduced role of RD   Multivitamin/mineral supplement therapy       Goals  Patient to consume % of nutritionally adequate meal trays TID, or the equivalent with supplements/snacks.        Monitoring/Evaluation  Progress toward goals will be monitored and evaluated per protocol.  Nicole Jones MS, RD, LD, CNSC    6C (beds 0837-7080) + 7C (beds 5215-8167) + ED   Available in [facility] by name or unit dietitian

## 2024-06-07 NOTE — PROGRESS NOTES
[id number]   Neuro: A&Ox3. Disoriented to date   Cardiac: HM3. VSS. Denies cardiac chest pain.  Respiratory: Sating >92% on RA. Denies SOB  GI/: Adequate urine output. No BM overnight.   Diet/appetite: 2 g Na diet 2L FR.  Activity:  Assist of 1-2.  Pain: At acceptable level on current regimen.   Skin: No new deficits noted.  LDA's: Midline to left arm.   Plan: Continue with POC. Notify primary team with changes.

## 2024-06-07 NOTE — PROGRESS NOTES
Agree with assessment and plan from overnight, below reflects the plan after morning rounding.       Dandy Sands is a 63 year old male admitted on 6/7/2024 as a transfer from [place] where he was most recently admitted 05/31/2024 for weakness, malaise, and persistent diarrhea in setting of chronic driveline infection now transferred to [facility] for ongoing ZAKI cares. He has a past medical history significant for ICM s/p HM3 placement 07/08/2022 at [facility], CAD s/p PCI to LAD 2005, in-stent restenosis with TISHA to mLAD and prox RCA 05/24/2022, HLD, c. Diff 09/2022, SLE, antiphospholipid syndrome, chronic anemia, anxiety, and other conditions.     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 At that time they were opting for ongoing treatment with dalbavancin, micafungin, and bactrim, although they were less certain that candida was active contributor vs colonizer. 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 Per review of previous 2023 discharge summary also had 1+ corynebacterium striatum from OR wound cultures at that time. For now will continue per their previous recommendations with daytime request for input from our ID team. Per OSH radiology read 06/03/2024 of CT abdomen/pelvis w/o contrast, no acute abnormalities but with mild ascites.   BC 05/31/2024: negative at [place]  - Please see bottom/data section for copied positive [place] culture results dating back through 2022  P:  - Of note when forumulating plan, patient has significant objections to continuing Dalbavancin as he attributes this to causing his diarrhea  - Consult to ID  - Continue micafungin 100 mg IV Q24H through 06/12 with  consideration of fluconazole for suppression after  - Continue dalbavancin (last dose 05/31)  - Continue Bactrim 800-160 BID through 06/11 then consider suppressive dosing   Somewhat unclear exactly what regimen he has been on over last hospital stay and other recent stay, although not a comprehensive list brief review reveals the following:   Vancomycin (05/23-05/24)  Micafungin (05/24-05/27, 06/02-**)  Fluconazole (05/27-06/01)  Bactrim (05/31-**)  Cefepime (5/23 - 5/27)  Dalbavancin (06/2023-present)    Ciprofloxacain (leading up to 05/23 admission was on 250 mg PO PTA for suppression, unclear duration)      #Ischemic Cardiomyopathy s/p HM3 LVAD 07/08/2022 at [facility]  #S/p CRT-D (2006) s/p RV lead upgrade 1/13/23   s/p LVAD 7/8/12 (Likely medtronic device remains from 03/2006 placement although not certain)   #CAD s/p PCI to LAD 2005, insent restenosis and TISHA mLAD, Prox RCA 05/2022  History of ischemic cardiomyopathy s/p chronic driveline infections as above.   - LVAD RPM set to 5100 at time of admission  - Pharmacy to dose warfarin  - Previously documented INR goal has been 2.0 to 3.0     ACEi/ARB/ARNi: None recently, historically was on lisinopril and hydralazine but these were stopped due to dizziness and low maps  BB: historical documentation reflects post-operative RV failure, hypotension, and low flows resulting in none prescribed  SGLT2i: none currently, previous documentation reflects concern for immunosuppression and concern for infections/unclear evidence in LVAD patients  MRA: none, reportedly due to low maps  Loop Diuretics:   - Continue PTA 20 mg lasix PO daily- goal of net even  - Mag to 2.0, K to 4.0   - Will order repeat TTE and plan for RHC next week     #Congestive Hepatopathy?  AST/ALT have been upper 100s to 300s since at least 06/01 at [place], results here on admission /. Previously thought related to congestive hepatopathy. Bilirubin WNL, alk phos 237.   - CTM  - RUQ U/S and  hepatitis panel ordered, GI consulted for this and diarrhea    #Hemorrhoids  Patient interested in having these surgically fixed if possible as he states the surgeons at [place] will not do an operation to fix them because he has a LVAD  - Consider surgical consult pending clinical course         #Diarrhea  Patient reports significant history of frequent diarrhea since being on antibiotics for his LVAD, which per his report he has been on nearly since time of placement. His most significant concern and what he finds most puzzling is that it typically goes away in the hospital and returns once home. He believes it may be due to dalbavancin although he did get a dose 05/31 and has not had diarrhea in some time so unclear. This is incredibly life limiting for him as he states he cannot even make it 1 hour without need for BM  - Enteric panel and C. Diff negative 05/23/2024 at [place]   - CTM  - Consider additional workup for non-infectious causes of diarrhea if returns     #C. Diff  Has had 2 admission for C. Diff 09/2022, negative 05/23/2024.   - CTM     #HLD  - Continue PTA atorvastatin 40 mg PO daily    #SLE, antiphospholipid syndrome  - Continue PTA hydroxychloroquine      #Chronic anemia  Per note review runs 8.0-9.0 chronically  - Sending LDH and haptoglobin along with admission CBC     #Code Status  Discussed this with patient during admission. He quite explicitly does not want chest compressions or to be resuscitated in a cardiac arrest situation, although would be ok with pre-arrest intubation. We discussed at some length how this code status creates a unique situation for him given presence of LVAD and how this would preclude chest compressions and that his ICD would likely shock him out of arrhythmias should they arise. He is accepting of this at the present time and understands that he may get a shock from his ICD. Therefore the most reasonable code status that closely matches his wishes seems to be DNR  with an ok for pre-arrest intubation only.   P:   - DNR  - Ok for pre-arrest intubation  - Understands and accepts he may get shocks from his ICD  - palliative and health psychology consult ordered

## 2024-06-07 NOTE — PROGRESS NOTES
"Stopped by to see patient s/p transfer from [facility]. Pt expressed distress regarding sharp decline in functional status s/t weakness and dizziness. Pt also endorses fluid retention as of late, further evidenced by higher than baseline PI's. Per son's report, pt has become so weak that independent ambulation has been significantly limited.     While pt's son expressed concerns regarding pt's VAD infection management, dizziness, and deconditioned state, upon interviewing pt, pt's main concerns are focused on quality of life. Patient states that his largest barriers to quality at this time are loose stools and weakness, which prevents him from leaving his home. Pt detailed encounter with [facility] team, who per pt explained that part of problem is loss of anal sphincter tone/\"muscle paralysis\" from previous serial colonoscopies. He adds that GI refused to offer surgical support, as pt has an LVAD. Both patient and son do not believe that [facility] is capable of helping him anymore. Provided therapeutic communication and validated pt's experience. He expressed that this has been impacting him to the point where, \"if nothing changes, I am going to end it all myself by popping this driveline apart\". Pt also notes that if these symptoms do not improve, patient \"will stop all the antibiotics and let the infection do its thing and just be done with this\".    Emphasized to patient that a VAD is therapy, and as such, it is our program's policy to discontinue VAD support at the patient's request if patient has decisional capacity at that time. Explained in detail the role of palliative care team in regards to quality of life and end of life cares in VAD patients. Pt clarified that he would \"like to continue living, but not like this anymore\". Pt is open to meeting with palliative care and health psychology at this time for in-depth evaluation of quality of life and steps moving forward.     Patient and " family's primary concerns are the following:     Frequent, loose stools affecting quality of life   Chronic driveline infection management by [facility] ID team vs. [place] ID team  Conversation with palliative time regarding quality of life and wishes moving forward   Severely declined functional capacity     At this time, this writer (VAD coordinator) has the following concerns:     Address symptoms affecting pt's quality of life. Support pt and family through management of realistic expectations and mental health support to determine next steps in care  Request that [facility]'s ID team review and re-evaluate existing plan of care in driveline infection management   Assess for possible worsening RHF  Address hepatic concerns in the setting of elevated LFT's and abdominal distention  Support pt's mental health given multiple self-injurious statements in regards to driveline disconnection     At the time of this conversation, cardiology 2 fellow also at bedside. Per fellow, plan will include GI, infectious disease, health psych, and palliative consults. Bedside echo today and potential RHC on Monday to assess hemodynamics; last RHC in 2022.  Will check in again with patient on Monday.       Indication of Interrogation:  Heart failure, eval hemodynamic fxn, flows/PI    Type of VAD:  Heartmate 3    Current Parameters:  Flow= 3.3 lpm, Speed= 5100 rpm, Power= 3.5 persaud, PI (if applicable)= 7.4    Abnormal Alarm on History:  No    Abnormal Events/Parameters Notes:  Yes, explain: High PI's, Cards 2 fellow aware    Changes Made during Interrogation:  No

## 2024-06-07 NOTE — PHARMACY-ADMISSION MEDICATION HISTORY
Pharmacist Admission Medication History    Admission medication history is complete. The information provided in this note is only as accurate as the sources available at the time of the update.    Information Source(s): Hospital records as patient disoriented     Pertinent Information:   -- patient's last dose of dalvance was 5/31/24  -- left lisinopril on the list as he was on this in the past but couldn't tolerate due to low MAPS  -- warfarin doses ranged from 1-3mg at OSH      Changes made to PTA medication list:  Added:   Micafungin   Bactrim tentative end date 6/12  Deleted: digoxin stopped   Changed:   Hydroxychloroquine at 300mg daily per OSH records    Allergies reviewed with patient and updates made in EHR: no    Medication History Completed By: Roldan Sanches [facility] 6/7/2024 8:34 AM    PTA Med List   Medication Sig Last Dose    aspirin (ASA) 81 MG EC tablet Take 1 tablet (81 mg) by mouth daily 6/6/2024    atorvastatin (LIPITOR) 40 MG tablet Take 1 tablet (40 mg) by mouth daily Unknown    furosemide (LASIX) 20 MG tablet TAKE 1 Tablet BY MOUTH DAILY Unknown    gabapentin (NEURONTIN) 100 MG capsule TAKE 1 Capsule BY MOUTH EVERY MORNING, NOON & BEDTIME 6/6/2024    hydroxychloroquine (PLAQUENIL) 200 MG tablet Take 1 tablet (200 mg) by mouth 2 times daily (Patient taking differently: Take 300 mg by mouth daily) 6/6/2024    micafungin 100 mg Inject 100 mg into the vein every 24 hours 6/6/2024    omeprazole (PRILOSEC) 40 MG DR capsule Take 40 mg by mouth 2 times daily 6/6/2024    potassium chloride hector ER (KLOR-CON M20) 20 MEQ CR tablet Take 1 tablet (20 mEq) by mouth daily 6/6/2024    sulfamethoxazole-trimethoprim (BACTRIM DS) 800-160 MG tablet Take 1 tablet by mouth 2 times daily 6/6/2024    tamsulosin (FLOMAX) 0.4 MG capsule Take 1 capsule (0.4 mg) by mouth daily 6/6/2024    warfarin ANTICOAGULANT (COUMADIN) 2 MG tablet Take 4 mg PO daily at 6 pm until next INR check, then dose per INR goal 2-3. 6/6/2024    warfarin  ANTICOAGULANT (COUMADIN) 2.5 MG tablet Take 2.5 to 3 tablets daily or as directed by the Coumadin clinic. 6/6/2024

## 2024-06-07 NOTE — PROGRESS NOTES
Admission         6/7/2024 12:30 AM   -----------------------------------------------------------  Diagnosis:      Admitted from:  [facility]  Report given from:  EMS  Via:  EMS stretcher   Accompanied by: EMS  Family Aware of Admission: yes  Belongings:  with pt  Admission Profile: Unable to complete  Teaching: Orientation to unit, call don't fall, use of call light, meal times, visiting hours,  when to call for the RN (angina/sob/dizzyness, etc.), and enforced importance of safety.  Access: Midline left arm  Telemetry: Placed on pt  Ht./Wt.: Complete    Two nurse head-to-toe skin assessment performed by Wallace SALAS and Debby PERDOMO.  Skin issues noted: none.  See PCS for assessment and treatment of wounds and surgical sites.    Temp:  [97.7  F (36.5  C)] 97.7  F (36.5  C)  Pulse:  [88] 88  Resp:  [18] 18  BP: (91)/(72) 91/72  SpO2:  [100 %] 100 %

## 2024-06-07 NOTE — PHARMACY-ANTICOAGULATION SERVICE
Clinical Pharmacy - Warfarin Dosing Consult     Pharmacy has been consulted to manage this patient s warfarin therapy.  Indication: LVAD/RVAD  Therapy Goal: INR 2-3  Warfarin Prior to Admission: Yes  Warfarin PTA Regimen: 1.25mg Sun,Wed and 2.5mg rest of week (From 5/31 Anti-coag appt)  Significant drug interactions: Bactrim and Fluconazole (Started 5/28 PTA)  Patient took 2.5 mg dose 6/6 prior to admission.    INR   Date Value Ref Range Status   06/07/2024 2.03 (H) 0.85 - 1.15 Final   06/07/2024 1.86 (H) 0.85 - 1.15 Final     Factor 10 Chromogenic   Date Value Ref Range Status   08/23/2022 24 (L) 70 - 130 % Final       Patient admitted after midnight and had already taken dose for 6/6/24.  Pharmacy will dose starting 6/7 and will monitor Dandy Sands daily and order warfarin doses to achieve specified goal.      Please contact pharmacy as soon as possible if the warfarin needs to be held for a procedure or if the warfarin goals change.

## 2024-06-07 NOTE — PROGRESS NOTES
Writer received an order to place an IV for this patient, apparently pt. Has Midline documented in Epic, inquired to nurse the need of second IV after reviewing his medications. Bedside nurse stated that an Xray needs to be done prior to using his Midline since it was inserted from outside of our hospital as per the doctor's order. Explained to bedside nurse that we don't need an Xray to verify the patency of a midline because it's a peripheral line with a longer catheter. Writer came to check the patient, he stated that his Midline was just place yesterday at [facility], writer checked patency, aspirated a good blood return, flushed with NSS 10ml without any resistance, no tenderness or swelling noted around site,has clean,dry intact dressing, no complaint of pain. Called the ordering provider (Darvin Santana) that we don't to have to do an Xray to check for patency of the midline and the nurse can use it for giving medication as writer already verified its patency. Doctor agreeed with the writer and writer notifed the bedside nurse as well that it's okay to use.

## 2024-06-07 NOTE — CONSULTS
"Palliative Care Consultation Note  [facility]      Patient: Dandy Sands  Date of Admission:  6/7/2024    Requesting Clinician / Team: Joseph Majano MD   Reason for consult: Goals of care  Decisional support  Patient and family support    \"establishing GOC given recent deconditioning with LVAD and lower quality of life\"       Recommendations & Counseling     GOALS OF CARE:    In chart review he has historically been DNR and OK with intubation, did not discuss in depth today  He is hopeful that the doctors will be able to 1. Control his infection and 2. Have controlled diarrhea while doing the first.   He wants to live longer if possible, aware if things are able to be achieved will need to discuss plan of care moving forward, seems to be contemplating a home plan and not prolonging his situation should he not achieve his goals stated above.  Greatly appreciate the various specialist weighing in to help with further GOC discussions    ADVANCE CARE PLANNING:  Patient has an advance directive dated 05/11/2022.  Primary Health Care Agent Asha- daughter.  Alternate(s) Amos- son.   There is no POLST form on file, recommend to complete prior to DC.  Code status: No CPR- Pre-arrest intubation OK    MEDICAL MANAGEMENT:   #Anxiety, Situational Anxiety  Use/practice relaxation strategies, Effectively communicate with medical provider re needed information, Effectively communicate with family/friends re needs, Identify triggers for anxiety/panic attacks, Identify early signs/symptoms of anxiety, Practice self awareness exercises, and Identify effective coping strategies  Hydroxyzine PRN of anxiety, has worked well in the past  Also has difficulty with sleep at night, consider trazodone at bedtime       PSYCHOSOCIAL/SPIRITUAL SUPPORT:  Main source of strength is his family and art, raised [employer] now Roman [employer].     Palliative Care will continue to follow. Thank you for " the consult and allowing us to aid in the care of Dandy Sands.    These recommendations have been discussed with primary team.    YAO Mckeon CNS  [facility], Palliative Care  Securely message with the [employer] Web Console (learn more here) or  Text page via [facility] Paging/Directory         Assessment      Dandy Sands is a 63 year old male with a past medical history of ICM s/p HM3 in 2022, CAD s/p PCI in 2005, in stent restenosis with TISHA to mLAD, and prox PCA in 05/2022, HLD, SLE, antiphosholipid syndrome, chronic anemia, anxiety who presented on 5/31 with weakness, malaise, persistent diarrhea to [place] and transferred to [facility] for further cares given his chronic driveline infection.      Today, the patient was seen for:  ICM s/p HM3  Chronic driveline infection  Severe diarrhea  Malnutrition  Anemia  Anxiety  Insomnia     History of Present Illness   Met with patient.   Introduced the role of palliative care as an interdisciplinary team that cares for patients with serious illness to help support symptom management, communication, coping for patients and their families as well as support with medical decision making.    Prognosis, Goals, & Planning:   Functional Status just prior to this current hospitalization:  ECOG1 (Restricted in physically strenuous activity but ambulatory and able to carry out work of a light or sedentary nature)    Prognosis, Goals, and/or Advance Care Planning:  We discussed general treatment options (full/restorative, selective/conservatives, and comfort only/hospice). We then discussed how these specifically apply to Dandy.  Based on this discussion, Dandy has decided to pursue work up for additional options he has to get feeling stronger and better again. He is very hopeful that the doctors may still have some ideas here that they didn't have in [place]. He also was able to talk today about if the doctors can't help control the diarrhea then considering a more comfort  focused approach.    Code Status was addressed today:   No , in chart review he is DNR, has an ICD on, and wants to be intubated. Will need further discussion with him and his family.    Patient's decision making preferences: independently        Patient has decision-making capacity today for complex decisions: Intact  He does seem to have some level of lack of acceptance of his condition and possibly a longer prognosis then doctors may think at times. He does seem to be able to talk and understand some of the choices he may make and the consequences at times.          Coping, Meaning, & Spirituality:   Mood, coping, and/or meaning in the context of serious illness were addressed today: Yes. He gets the most support from his art. He notes that he gets fairly anxious in the hospital and can't sleep often.     Social:   Living situation:lives alone  Important relationships/caregivers:daughter who is due in August with 2nd child, son who just started a new job and does not have time off now. Has a cat.  Occupation: retired two years ago, was a   Areas of fulfillment/gladys: he wants to get back to being able to play pool, he is very limited in his life by the diarrhea. He goes to [employer] once a week and finds his art very helpful.    Medications:  Reviewed this patient's medication profile and medications from this hospitalization. Notable medications:  Gabapentin 100 mg TID  Protonix 40 mg BID      ROS:  Comprehensive ROS is reviewed and is negative except as here & per HPI:     Physical Exam   Vital Signs with Ranges  Temp:  [97.5  F (36.4  C)-97.8  F (36.6  C)] 97.5  F (36.4  C)  Pulse:  [86-88] 86  Resp:  [18] 18  BP: (91)/(72) 91/72  SpO2:  [98 %-100 %] 100 %  Wt Readings from Last 10 Encounters:   06/07/24 64.5 kg (142 lb 3.2 oz)   12/14/23 72.6 kg (160 lb)   08/24/23 72.6 kg (160 lb)   08/24/23 72.6 kg (160 lb)   08/24/23 67.1 kg (148 lb)   03/25/23 63.6 kg (140 lb 4.8 oz)   03/17/23 69.4 kg (153 lb 1.6  oz)   03/16/23 70.3 kg (155 lb)   01/13/23 64.7 kg (142 lb 11.2 oz)   01/12/23 67.6 kg (149 lb)     142 lbs 3.2 oz    PHYSICAL EXAM:  Constitutional: Awake, alert, cooperative, frail and cachectic, no apparent distress  Lungs: No increased work of breathing, good air exchange  Cardiovascular: HM3  Neurologic: Awake, alert, oriented to name, place and time.    Skin: No rashes, erythema      Data reviewed:  Results for orders placed or performed during the hospital encounter of 06/07/24 (from the past 24 hour(s))   Ionized Calcium   Result Value Ref Range    Calcium Ionized Whole Blood 4.7 4.4 - 5.2 mg/dL   Phosphorus   Result Value Ref Range    Phosphorus 3.7 2.5 - 4.5 mg/dL   Magnesium   Result Value Ref Range    Magnesium 2.1 1.7 - 2.3 mg/dL   CBC with platelets   Result Value Ref Range    WBC Count 4.9 4.0 - 11.0 10e3/uL    RBC Count 4.27 (L) 4.40 - 5.90 10e6/uL    Hemoglobin 9.0 (L) 13.3 - 17.7 g/dL    Hematocrit 29.8 (L) 40.0 - 53.0 %    MCV 70 (L) 78 - 100 fL    MCH 21.1 (L) 26.5 - 33.0 pg    MCHC 30.2 (L) 31.5 - 36.5 g/dL    RDW 23.0 (H) 10.0 - 15.0 %    Platelet Count 197 150 - 450 10e3/uL   Comprehensive metabolic panel   Result Value Ref Range    Sodium 132 (L) 135 - 145 mmol/L    Potassium 4.2 3.4 - 5.3 mmol/L    Carbon Dioxide (CO2) 17 (L) 22 - 29 mmol/L    Anion Gap 13 7 - 15 mmol/L    Urea Nitrogen 31.9 (H) 8.0 - 23.0 mg/dL    Creatinine 1.14 0.67 - 1.17 mg/dL    GFR Estimate 73 >60 mL/min/1.73m2    Calcium 9.4 8.8 - 10.2 mg/dL    Chloride 102 98 - 107 mmol/L    Glucose 96 70 - 99 mg/dL    Alkaline Phosphatase 237 (H) 40 - 150 U/L     (H) 0 - 45 U/L     (H) 0 - 70 U/L    Protein Total 7.3 6.4 - 8.3 g/dL    Albumin 4.3 3.5 - 5.2 g/dL    Bilirubin Total 0.9 <=1.2 mg/dL   INR   Result Value Ref Range    INR 1.86 (H) 0.85 - 1.15   Partial thromboplastin time   Result Value Ref Range    aPTT 45 (H) 22 - 38 Seconds   Uric acid   Result Value Ref Range    Uric Acid 5.3 3.4 - 7.0 mg/dL   Lactate  Dehydrogenase   Result Value Ref Range    Lactate Dehydrogenase 251 (H) 0 - 250 U/L   Haptoglobin   Result Value Ref Range    Haptoglobin 108 30 - 200 mg/dL   INR   Result Value Ref Range    INR 2.03 (H) 0.85 - 1.15   Basic metabolic panel   Result Value Ref Range    Sodium 134 (L) 135 - 145 mmol/L    Potassium 4.4 3.4 - 5.3 mmol/L    Chloride 103 98 - 107 mmol/L    Carbon Dioxide (CO2) 18 (L) 22 - 29 mmol/L    Anion Gap 13 7 - 15 mmol/L    Urea Nitrogen 28.5 (H) 8.0 - 23.0 mg/dL    Creatinine 1.18 (H) 0.67 - 1.17 mg/dL    GFR Estimate 70 >60 mL/min/1.73m2    Calcium 9.4 8.8 - 10.2 mg/dL    Glucose 89 70 - 99 mg/dL   CBC with platelets   Result Value Ref Range    WBC Count 4.9 4.0 - 11.0 10e3/uL    RBC Count 4.34 (L) 4.40 - 5.90 10e6/uL    Hemoglobin 9.3 (L) 13.3 - 17.7 g/dL    Hematocrit 30.6 (L) 40.0 - 53.0 %    MCV 71 (L) 78 - 100 fL    MCH 21.4 (L) 26.5 - 33.0 pg    MCHC 30.4 (L) 31.5 - 36.5 g/dL    RDW 22.7 (H) 10.0 - 15.0 %    Platelet Count 180 150 - 450 10e3/uL   GGT   Result Value Ref Range     (H) 8 - 61 U/L   CK total   Result Value Ref Range    CK 34 (L) 39 - 308 U/L   Nt probnp inpatient   Result Value Ref Range    N terminal Pro BNP Inpatient 999 (H) 0 - 900 pg/mL   US Abdomen Limited    Narrative    EXAMINATION: Right upper quadrant ultrasound 6/7/2024 9:44 AM     HISTORY: Elevated LFTs of unknown origin.       TECHNIQUE: The abdomen was scanned in standard fashion with  specialized ultrasound transducer(s) using both gray-scale, color  Doppler, and spectral flow techniques.    COMPARISON: 5/20/2022 right upper quadrant ultrasound    FINDINGS:     Fluid: No evidence of ascites or pleural effusions.    Liver: The liver mildly increased echogenicity relative to the renal  cortical parenchyma, measuring 16.9 cm in craniocaudal dimension.  There is no focal mass.     Gallbladder: There is no wall thickening, pericholecystic fluid, or  positive sonographic Prater's sign. Questionable small  gallstones near  the neck of the gallbladder. Biliary sludge is present.      Bile Ducts: No significant intrahepatic biliary dilatation.  The  common bile duct is not visualized this study.     Pancreas: Pancreas was unable to be visualized in this study.    Kidney: The right kidney measures 10.7 cm long. There is no  hydronephrosis or hydroureter, no shadowing renal calculi, cystic  lesion or mass.       Impression    IMPRESSION:  1. Mildly increased echogenicity of the hepatic parenchyma relative to  the right renal cortex may be seen in hepatic steatosis.  2. Questionable gallstones in the neck of the gallbladder with biliary  sludge without evidence for acute cholecystitis.  3. The pancreas and common bile duct could not be visualized in this  study.    I have personally reviewed the examination and initial interpretation  and I agree with the findings.    RAUL PELAYO DO         SYSTEM ID:  [id number]     *Note: Due to a large number of results and/or encounters for the requested time period, some results have not been displayed. A complete set of results can be found in Results Review.     Recent Labs   Lab 06/07/24  0840 06/07/24  0502 06/07/24  0237   WBC 4.9  --  4.9   HGB 9.3*  --  9.0*   MCV 71*  --  70*     --  197   INR  --  2.03* 1.86*   *  --  132*   POTASSIUM 4.4  --  4.2   CHLORIDE 103  --  102   CO2 18*  --  17*   BUN 28.5*  --  31.9*   CR 1.18*  --  1.14   ANIONGAP 13  --  13   ELDA 9.4  --  9.4   GLC 89  --  96   ALBUMIN  --   --  4.3   PROTTOTAL  --   --  7.3   BILITOTAL  --   --  0.9   ALKPHOS  --   --  237*   ALT  --   --  317*   AST  --   --  243*       Recent Results (from the past 24 hour(s))   US Abdomen Limited    Narrative    EXAMINATION: Right upper quadrant ultrasound 6/7/2024 9:44 AM     HISTORY: Elevated LFTs of unknown origin.       TECHNIQUE: The abdomen was scanned in standard fashion with  specialized ultrasound transducer(s) using both gray-scale, color  Doppler, and  spectral flow techniques.    COMPARISON: 5/20/2022 right upper quadrant ultrasound    FINDINGS:     Fluid: No evidence of ascites or pleural effusions.    Liver: The liver mildly increased echogenicity relative to the renal  cortical parenchyma, measuring 16.9 cm in craniocaudal dimension.  There is no focal mass.     Gallbladder: There is no wall thickening, pericholecystic fluid, or  positive sonographic Prater's sign. Questionable small gallstones near  the neck of the gallbladder. Biliary sludge is present.      Bile Ducts: No significant intrahepatic biliary dilatation.  The  common bile duct is not visualized this study.     Pancreas: Pancreas was unable to be visualized in this study.    Kidney: The right kidney measures 10.7 cm long. There is no  hydronephrosis or hydroureter, no shadowing renal calculi, cystic  lesion or mass.       Impression    IMPRESSION:  1. Mildly increased echogenicity of the hepatic parenchyma relative to  the right renal cortex may be seen in hepatic steatosis.  2. Questionable gallstones in the neck of the gallbladder with biliary  sludge without evidence for acute cholecystitis.  3. The pancreas and common bile duct could not be visualized in this  study.    I have personally reviewed the examination and initial interpretation  and I agree with the findings.    RAUL PELAYO DO         SYSTEM ID:  [id number]       Medical Decision Making       MANAGEMENT DISCUSSED with the following over the past 24 hours: cardiology, nursing   NOTE(S)/MEDICAL RECORDS REVIEWED over the past 24 hours: cardiology. Nursing, MCS coordinator, nutrition,   Tests REVIEWED in the past 24 hours:  - See lab/imaging results included in the data section of the note  Medical complexity over the past 24 hours:  - Decision regarding ESCALATION OF LEVEL OF CARE

## 2024-06-07 NOTE — CONSULTS
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:  - Current suppressive regimen: biweekly dalbavancin (started 6/2023), Bactrim (on treatment dose), plan for treatment with micafungin then fluconazole suppression   - Organisms:   - Serratia marcescens (first isolated: 7/12/23, most recent: 5/23/24)  - Candida parapsilosis (first isolated: 5/23/24)    - E.coli (first isolated: 8/2023, most recent: 12/14/23)  - Corynebacterium striatum (first isolated 12/2022, most recent: 3/18/2023)  - VRE single colony 10/10/23  Diarrhea, chronic since 2021  - long-standing, has had extensive work up including EGD and colonoscopy (done in 2021)  - OSH notes document formed stools in hospital  Weakness, malaise  Hx C.diff  SLE, antiphospholipid syndrome- on Plaquenil    DISCUSSION:   Dandy Sands is a 63 year old male with history of ICM s/p HeartMate3 LVAD (7/8/2022) c/b polymicrobial chronic driveline infection on suppressive dalbavancin, cipro recently changed to Bactrim, with Corynebacterium, E.coli, Serratia, and more recently Candida parapsilosis isolated; chronic diarrhea since 2021, C.diff (9/2022), SLE on Plaquenil, antiphosopholipid syndrome who was admitted to [facility] ([place], [place]) on 5/31 with weakness, malaise, and diarrhea.     Recent hospitalization with concern for worsening driveline infection cx with Serratia (resistant to cipro- previous suppressive regimen) and C.parapsilosis. Dalbavancin was continued and he was started on treatment-dose Bactrim as well as micafungin with plan for transition to suppression-dose Bactrim and fluconazole after the treatment course. No signs of acute infection at present,  continue with the outside ID provider's previously outlined plan.     Diarrhea likely multifactorial. Possibility that recent worsening LVAD infection (5/22 admission), possible viral illness may have been contributing to worsening of the chronic diarrhea around 5/19-5/22 leading up to recent admission. No diarrhea while under direct, objective observation while hospitalized between 5/31-6/7. In review of previous notes - Dandy felt that Bactrim was the culprit. Dandy feels strongly that diarrhea is due to dalbavancin, though diarrhea started over a year before dalbavancin and on first several months of clinic visits was not having significant issues with diarrhea. Was hospitalized with diarrhea in 10/2023 that improved without significant changes to his antimicrobial regimen, phases of worsening diarrhea have been intermittent before and after starting dalbavancin. On most recent outpatient ID visit (5/6/24) diarrhea was well controlled with Citrucel. Less likely that dalbavancin is sole cause of symptoms. Additionally, he has not had any diarrhea since last dalbavancin infusion on 5/31. This regimen is the easiest for patient care needs at home compared to daily infusions, could consider minocycline but had breakthrough on this and would need to be discussed with primary ID team. Discussed this with Dandy on 6/7 as well as risk that infection would come back and get worse. He still strongly believes the dalbavancin is the culprit and states he would rather have the infection come back than have diarrhea.      RECOMMENDATION:  Continue dalbavancin suppression  - If Dandy does not want to continue dalbavancin when nearing next infusion, would need to coordinate with primary ID provider at [place] for their preferred substitution.  Continue Bactrim 1DS BID at treatment dose through 6/11 (~4wks) then OK to decrease to suppressive dose of 1DS daily vs deferring to [place] ID for transition to suppression   Continue  "Micafungin through 6/12 (~4wks) then transition to fluconazole 200mg daily for suppression.   Consider cognitive evaluation or family engagement if making large care plan changes (such as stopping suppression/LVAD cares), per OSH notes- daughter with concern that he has not been a reliable historian, previous concerns for cognitive issues, and neuropsychological evaluation 1/12/2024 that stated \"family assistance with complex decision making is recommended given his difficulties with executive functioning\".  Appreciate GI evaluation for diarrhea dating back to at least 2021  Appreciate palliative engagement  ID will sign off at this time, please don't hesitate to contact the [facility] ID team should further questions arise.      Thank you for this consult. ID will continue to follow.       Grazyna Millan PA-C  Pronouns: she/her/hers  Infectious Diseases  Contact via [facility] [employer] or [employer] Paging/[employer]      80 MINUTES SPENT BY ME on the date of service doing chart review, history, exam, documentation & further activities per the note.       ________________________________________________________________    Consult Question: chronic driveline infection.   Admission Diagnosis: Wound check, has LVAD  Infection         History of Present Illness:   Dandy Sands is a 63 year old male with history of ICM s/p HeartMate3 LVAD (7/8/2022) c/b polymicrobial chronic driveline infection on suppressive dalbavancin, cipro recently changed to Bactrim, with Corynebacterium, E.coli, Serratia, and more recently Candida parapsilosis isolated; chronic diarrhea since 2021, C.diff (9/2022), SLE on Plaquenil, antiphosopholipid syndrome who was admitted to [facility] ([place], [place]) on 5/31 with weakness, malaise, and diarrhea.     Dandy was recently hospitalized at [facility] 5/22-5/28 with fevers, chills, concern for worsening driveline infection. At the time he had been on dalbavancin and ciprofloxacin for suppression. Cultures with " "Serratia (resistant to cipro) and C.parapsilosis. Dalbavancin was continued and he was started on treatment-dose Bactrim as well as micafungin with plan for transition to suppression-dose Bactrim and fluconazole after the treatment course.     Dandy returned home then was readmitted 5/31 with weakness and inability to care for himself as well as ongoing diarrhea. He has been dealing with diarrhea since at least 2021, worsening in 8/2022 and treated for C.diff 9/2022. He attributes the diarrhea to dalbavancin (started 6/2023). States that he is unable to eat or drink without having an \"explosion\" and that he does not want to leave the house. Reports 4 episodes of diarrhea per day. Of note, no diarrhea during OSH hospitalization from 5/31-transfer on 6/7 and no stools by afternoon of 6/7.    Driveline site has not been red, swollen, or painful. No increased drainage. He does not use anchors unless he's going to rehab or doing more activity as they cause skin irritation/blistering where applied. His kids do dressing changes Tues/Thurs/Sat. States he thinks that he would rather deal with uncontrolled infection than the diarrhea.             Past Medical History:     Past Medical History:   Diagnosis Date    Antiphospholipid antibody syndrome (H24)     CAD (coronary artery disease)     Chronic systolic heart failure (H)     Ischemic cardiomyopathy     Mitral regurgitation     Systemic lupus erythematosus (H)             Past Surgical History:     Past Surgical History:   Procedure Laterality Date    COLONOSCOPY N/A 05/23/2022    Procedure: COLONOSCOPY;  Surgeon: Parth Meneses MD;  Location: [facility]    CV CORONARY ANGIOGRAM N/A 05/24/2022    Procedure: Coronary Angiogram;  Surgeon: Mike Pope MD;  [facility]: [facility]    CV CORONARY ANGIOGRAM N/A 05/29/2022    Procedure: Coronary Angiogram;  Surgeon: Austin Bright MD;  Location:  HEART CARDIAC CATH LAB    CV CORONARY LITHOTRIPSY PCI " Bilateral 05/24/2022    Procedure: Percutaneous Coronary Intervention - Lithotripsy;  Surgeon: Mike Pope MD;  [facility]    CV INTRA AORTIC BALLOON N/A 06/17/2022    Procedure: Intraprocedure Aortic Balloon Pump Insertion;  Surgeon: Sherman Cerda MD;  [facility]: [facility]    CV INTRA AORTIC BALLOON Right 07/03/2022    Procedure: Reposition of Subclavian Intraprocedure Aortic Balloon Pump;  Surgeon: Austin Bright MD;  [facility]    CV INTRAVASULAR ULTRASOUND N/A 05/24/2022    Procedure: Intravascular Ultrasound;  Surgeon: Mike Pope MD;  [facility]    CV PCI ANGIOPLASTY N/A 05/29/2022    Procedure: Percutaneous Transluminal Angioplasty;  Surgeon: Austin Bright MD;  [facility]    CV PCI STENT DRUG ELUTING N/A 05/24/2022    Procedure: Percutaneous Coronary Intervention Stent;  Surgeon: Mike Pope MD;  [facility]    CV RIGHT HEART CATH MEASUREMENTS RECORDED N/A 05/18/2022    Procedure: Right Heart Catheterization;  Surgeon: Justo Stewart MD;  [facility]    CV RIGHT HEART CATH MEASUREMENTS RECORDED N/A 05/24/2022    Procedure: Right Heart Catheterization;  Surgeon: Mike Pope MD;  [facility]    CV RIGHT HEART CATH MEASUREMENTS RECORDED N/A 05/29/2022    Procedure: Right Heart Catheterization;  Surgeon: Austin Bright MD;  [facility]    CV RIGHT HEART CATH MEASUREMENTS RECORDED N/A 07/01/2022    Procedure: Right Heart Catheterization;  Surgeon: Mike Pope MD;  [facility]    CV RIGHT HEART CATH MEASUREMENTS RECORDED N/A 09/23/2022    Procedure: Right Heart Catheterization;  Surgeon: Justo Stewart MD;  Location:  HEART CARDIAC CATH LAB    CV RIGHT HEART EXERCISE STRESS STUDY N/A 05/18/2022    Procedure: Stress Drug Study;   Surgeon: Justo Stewart MD;  Location:  HEART CARDIAC CATH LAB    CV SWAN CHOCO PROCEDURE N/A 06/17/2022    Procedure: Holcomb Choco Procedure;  Surgeon: Sherman Cerda MD;  Location:  HEART CARDIAC CATH LAB    EP PACEMAKER OR ICD LEAD IMPLANT-ONE LEAD N/A 01/13/2023    Procedure: CRT-D Lead revision;  Surgeon: Beckie Maurice MD;  Location:  HEART CARDIAC CATH LAB    INCISION AND DRAINAGE CHEST WASHOUT, COMBINED N/A 07/11/2022    Procedure: Chest washout and closure;  Surgeon: Darnell Morgan MD;  [facility]    INCISION AND DRAINAGE CHEST WASHOUT, COMBINED N/A 07/12/2022    Procedure: INCISION AND DRAINAGE, WOUND, CHEST, WITH IRRIGATION;  Surgeon: Darius Zamora MD;  [facility]: [facility]    INCISION AND DRAINAGE CHEST WASHOUT, COMBINED N/A 03/18/2023    Procedure: Incision and drainage of driveline;  Surgeon: Darius Zamora MD;  [facility]    INJECT NERVE OTHER PERIPHERAL Left 01/16/2023    Procedure: Cryoablation of intercostal nerves;  Surgeon: Kenroy Nguyen MD;  [facility]    INSERT ARTERIAL LINE  07/18/2022         INSERT INTRAAORTIC BALLOON PUMP Right 06/23/2022    Procedure: INSERTION, INTRA-AORTIC BALLOON PUMP RIGHT SUBCLAVIAN ARTERY.;  Surgeon: Hayden Veras MD;  Location: [facility]    INSERT VENTRICULAR ASSIST DEVICE LEFT (HEARTMATE II/III) MINIMALLY INVASIVE N/A 07/08/2022    Procedure: MEDIAN STERNOTOMY.  CARDIOPULMONARY BYPASS.  INTRAOPERATIVE TRANSESOPHAGEAL ECHOCARDIOGRAM.  IMPLANT LEFT VENTRICULAR ASSIST DEVICE HEARTMATE III.  PLACEMENT OF PERCUTANEOUS RIGHT VENTRICULAR ASSIST DEVICE.  TEMPORARY CHEST CLOSURE;  Surgeon: Darius Zamora MD;  [facility]    IR CHEST TUBE PLACEMENT NON-TUNNELED RIGHT  08/01/2022    IRRIGATION AND DEBRIDEMENT CHEST WASHOUT, COMBINED N/A 07/13/2022    Procedure: IRRIGATION AND DEBRIDEMENT, CHEST;  Surgeon: Darius Zamora MD;  [facility]    MIDLINE DOUBLE LUMEN PLACEMENT Left 09/11/2022    Mid line ok to use     MIDLINE DOUBLE LUMEN PLACEMENT Right 09/14/2022    5FR DL midline.    PICC DOUBLE LUMEN PLACEMENT Right 05/28/2022    right basilic 5 fr dl picc 40 cm    PICC DOUBLE LUMEN PLACEMENT Right 08/15/2022    Right Lateral, 41 total length, 3 cm external length    PICC SINGLE LUMEN PLACEMENT Right 11/01/2022    41cm (2cm external), Lateral brachial vein, low SVC    PICC SINGLE LUMEN PLACEMENT Right 03/21/2023    Right lateral brachial, 40 cm            Family History:   Reviewed and non-contributory.   No family history on file.         Social History:     Social History     Tobacco Use    Smoking status: Former    Smokeless tobacco: Never   Substance Use Topics    Alcohol use: Not on file     History   Sexual Activity    Sexual activity: Not on file            Current Medications:     Current Facility-Administered Medications   Medication Dose Route Frequency Provider Last Rate Last Admin    aspirin EC tablet 81 mg  81 mg Oral Daily Darvin Santana MD   81 mg at 06/07/24 0852    atorvastatin (LIPITOR) tablet 40 mg  40 mg Oral Daily Darvin Santana MD   40 mg at 06/07/24 0852    ferrous sulfate (FEROSUL) tablet 325 mg  325 mg Oral Every Other Day Darvin Santana MD   325 mg at 06/07/24 0852    furosemide (LASIX) tablet 20 mg  20 mg Oral Daily Darvin Santana MD   20 mg at 06/07/24 0852    gabapentin (NEURONTIN) capsule 100 mg  100 mg Oral TID Darvin Santana MD   100 mg at 06/07/24 0852    [START ON 6/8/2024] hydroxychloroquine (PLAQUENIL) half-tab 300 mg  300 mg Oral Daily Justo Stewart MD        multivitamin w/minerals (THERA-VIT-M) tablet 1 tablet  1 tablet Oral Daily Justo Stewart MD        pantoprazole (PROTONIX) EC tablet 40 mg  40 mg Oral BID AC Darvin Santana MD   40 mg at 06/07/24 0852    potassium chloride hector ER (KLOR-CON M10) CR tablet 20 mEq  20 mEq Oral Daily Darvin Santana MD   20 mEq at 06/07/24 0852    sodium chloride (PF) 0.9% PF flush 3 mL  3 mL Intracatheter Q8H Esther,  Darvin Gregory MD   3 mL at 06/07/24 0626    sulfamethoxazole-trimethoprim (BACTRIM DS) 800-160 MG per tablet 1 tablet  1 tablet Oral BID Darvin Santana MD   1 tablet at 06/07/24 0852    tamsulosin (FLOMAX) capsule 0.4 mg  0.4 mg Oral Daily Darvin Santana MD   0.4 mg at 06/07/24 0852    warfarin ANTICOAGULANT (COUMADIN) tablet 2.5 mg  2.5 mg Oral ONCE at 18:00 Justo Stewart MD        Warfarin Dose Required Daily - Pharmacist Managed  1 each Oral See Admin Instructions Darvin Santana MD                Allergies:   No Known Allergies         Physical Exam:   Vitals were reviewed  Temp:  [97.5  F (36.4  C)-97.8  F (36.6  C)] 97.5  F (36.4  C)  Pulse:  [86-88] 86  Resp:  [18] 18  BP: (91)/(72) 91/72  SpO2:  [98 %-100 %] 100 %    Physical Examination:  GENERAL:  well-developed, well-nourished, in bed in no acute distress.   HEENT:  Head is normocephalic, atraumatic   EYES:  Eyes have anicteric sclerae without conjunctival injection or stigmata of endocarditis.    ENT:  Oropharynx is moist without exudates or ulcers. Tongue is midline  NECK:  Supple. No cervical lymphadenopathy  LUNGS:  Clear to auscultation bilateral.   CARDIOVASCULAR:  Regular rate and rhythm with no murmurs, gallops or rubs.  ABDOMEN:  Normal bowel sounds, soft, nontender. No appreciable hepatosplenomegaly  SKIN:  No acute rashes.  Line(s) are in place without any surrounding erythema or exudate. No stigmata of endocarditis.  NEUROLOGIC:  Grossly nonfocal. Active x4 extremities         Laboratory Data:   Microbiology:  No results found for the last 90 days.      Inflammatory Markers    Recent Labs   Lab Test 08/24/23  0943 01/12/23  0822 12/12/22  0854 10/28/22  0533 08/03/22  0606 06/19/22  1522   SED  --   --   --   --   --  39*   CRPI 18.20* 74.90* 59.80* 111.00*   < >  --     < > = values in this interval not displayed.       Hematology Studies    Recent Labs   Lab Test 06/07/24  0840 06/07/24  0237 12/14/23  0826 08/24/23  0943  07/20/22  1523 07/20/22  0949 07/20/22  0355 07/19/22  2049 07/19/22  1601 07/19/22  0946   WBC 4.9 4.9 3.2* 5.0   < > 15.9* 13.9* 22.4*   < > 18.5*   ANEU  --   --   --   --   --  14.6* 11.8* 19.5*  --  16.3*   AEOS  --   --   --   --   --  0.5 0.1 0.4  --  0.2   HGB 9.3* 9.0* 9.6* 8.6*   < > 8.5* 8.1* 8.2*   < > 8.3*   MCV 71* 70* 81 88   < > 90 90 90   < > 89    197 177 241   < > 150 157 200   < > 164    < > = values in this interval not displayed.       Metabolic Studies     Recent Labs   Lab Test 06/07/24  0840 06/07/24  0237 12/21/23  1100 12/14/23  0826 08/31/23  0947 08/24/23  0943   * 132*  --  137  --  139   POTASSIUM 4.4 4.2  --  4.3  --  3.6   CHLORIDE 103 102 107 105   < > 106   CO2 18* 17*  --  19*  --  21*   BUN 28.5* 31.9*  --  33.2*  --  22.3   CR 1.18* 1.14  --  1.53*  --  1.12   GFRESTIMATED 70 73  --  51*  --  75    < > = values in this interval not displayed.       Hepatic Studies    Recent Labs   Lab Test 06/07/24  0237 12/14/23  0826 08/24/23  0943   BILITOTAL 0.9 0.4 0.4   ALKPHOS 237* 206* 205*   ALBUMIN 4.3 4.2 4.2   * 32 22   * 33 22       Urine Studies    Recent Labs   Lab Test 10/28/22  0926   LEUKEST Negative   WBCU 1       Vancomycin Levels    Recent Labs   Lab Test 08/24/23  0943 03/26/23  0753 03/23/23  0616   VANCOMYCIN <4.0 17.7 14.2       Hepatitis B Testing   Recent Labs   Lab Test 05/20/22  0516   HBCAB Nonreactive   HEPBANG Nonreactive     Hepatitis C Testing     Hepatitis C Antibody   Date Value Ref Range Status   05/20/2022 Nonreactive Nonreactive Final             Imaging:

## 2024-06-07 NOTE — CONSULTS
"    Gastroenterology Consultation  GI Luminal Service    Date of Admission:  6/7/2024  Reason for Admission: LVAD, Chronic Driveline Infection, Chronic Sternal Wound, Weakness, Malaise   Date of Consult  6/7/2024   Requesting Physician:  Justo Stewart MD           ASSESSMENT AND RECOMMENDATIONS:   Assessment:  Dandy Sands is a 63 year old male with a history of hypertension, hyperlipidemia, systemic lupus erythematosus, antiphospholipid syndrome on chronic warfarin, pulmonary embolism (on warfarin), CAD status-post PCI to LAD in 2005, in-stent re-stenosis with TISHA to mLAD and prox RCA 5/24/2022, ICM (EF 10-15%) status-post HM3 LVAD 7/28/2022, history of C. 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     The GI Luminal Service was consulted regarding: \"Chronic diarrhea, basic infectious workup at OSH was negative (C diff, stool PCR), help with evaluation of non-infectious causes, also having elevated LFTs without a clear origin (not volume overloaded, RUQ E/S not that remarkable).\"       # Lactose Intolerance  # History of Chronic Loose Stools, unclear whether patient continues to have loose stools  # History of Internal Hemorrhoids  # Concern for Memory Deficits  Though patient reports a 1 year history of loose stools, per chart review, patient has reports of chronic loose stools dating back to at least 2021 with previous GI endoscopies (EGD and colonoscopy in 2021) for the indication of loose stools without histologic abnormalities in the upper or lower GI tract to explain chronic loose stools. Celiac Serologies 6/15/2023 were negative.  Most recent EGD " 6/16/2023 at [facility] reports 2 non-bleeding superficial duodenal ulcers with no stigmata of bleeding found in the second portion of the duodenum with largest lesion 7 mm in largest dimension and diffuse mild mucosal changes characterized by flattening were found in the second portion of the duodenum but no specimens were collected during this exam.   -- 5/22/2024: Negative Influenza A/B, RSV, SARS-CoV-2, Parainfluenza 1/2/3. TSH within normal limits.   -- 5/23/2024: Stool testing at outside hospital includes C Difficile Toxin B Gene NAD not detected, negative cryptosporidium, no WBCs on gram stain. Negative infectious stool panel.   -- 6/3/2024: CT Abdomen/Pelvis without Contrast 6/3/2024 at [facility] reports normal caliber small and large bowel, no obstruction, no pericolonic inflammatory changes, appendix not identified, no evidence of acute appendicitis.    Remains unclear whether this patient is truly having loose stools, as patient's provided history fluctuates between stating 5-7 loose stools then every other day then now stating no bowel movement in 2 days, concerning for memory deficits. Also unclear whether this could be constipation with overflow loose stools. Would hold off on any antidiarrheals at this time to avoid inducing constipation until objective stool output data is obtained. Diarrhea by definition is greater than 3 liquid stools in less than 24 hours. At this time, it does not appear that this patient's rectal output meets the criteria for diarrhea. Recommend obtaining objective evidence of frequency, consistency and amount of stool through strict documentation of rectal output by hospital staff to determine whether this patient is truly having loose stools. Recommend patient be given a notebook to keep a stool diary to strictly document bowel movements including the time, consistency and amount. However, patient would benefit from a cognitive assessment at this time with concern  the patient may not accurately remember rectal output.     Patient reports lactose intolerance with loose stools after dairy consumption so would recommend strict lactose free diet.     If the patient is truly having chronic loose stools, etiology of chronic loose stools remains unclear in this patient with loose stools reported since at least 2021. Nonetheless, chronic loose stools differential includes medication adverse effect [Home medications with Diarrhea as Medication Adverse Effect per UpToDate: Micafungin: Diarrhea (7% to 11%); Atorvastatin: Diarrhea (7% to 14%); Gabapentin: diarrhea (IR, adults: 6%); Tamsulosin: Diarrhea (6%); Lisinopril: diarrhea  (1-10%); Omeprazole: diarrhea (4%); Dalbavancin: diarrhea (4%); Bactrim: antibiotic lead to gut dysbiosis and subsequent loose stools], potentially an osmotic etiology (lactose intolerance, laxatives, antacids, artificial/alcohol sugars in food products and drinks, fructose), potentially small intestinal bacterial overgrowth, IBS-D, potentially microscopic colitis (though previous colonic biopsies have been negative, no nocturnal bowel movements), potentially constipation with overflow loose stools, potentially pelvic floor dysfunction contributing with history of fecal incontinence.     With previously visualized duodenal ulcerations on EGD 6/16/2023 and unable to locate previous Helicobacter pylori testing, will order H. Pylori stool antigen, noting that patient takes a chronic PPI which can lead to a false negative stool antigen test; however, if positive, would treat with quadruple therapy. Less likely decreased bile acid absorption with no history of bowel resection. Less likely chronic infection but will assess with Enteric Stool pathogen panel and microsporidia to thoroughly investigate infectious causes. Ordered stool potassium and stool sodium to assess for osmotic loose stools. Will assess for colonic inflammation with fecal calprotectin.      No overt  GI bleeding, so no indication for acute/emergent GI endoscopic intervention at this time. Patient endorses ongoing problem with hemorrhoids, so patient could consider following up with an outpatient Colorectal surgeon to discuss treatment options for hemorrhoids.     Patient currently takes Citrucel 3 capsules in the morning and 2 capsules in the evening. Question whether trial of an alternative fiber such as Metamucil or Benefiber would be beneficial. Recommend RD nutrition evaluation and discussion regarding adequate oral fiber intake, fiber supplementation options, and overall nutritional optimization.       # Microcytic Anemia  - MCV 71 fL.  - Hemoglobin 9.3 g/dL.   - LDH barely elevated to 251 U/L.   - Iron studies + ferritin pending.   - Reticulocyte count pending.   - Peripheral smear pending.   Defer additional hematologic anemia work-up per primary team. Patient's home medication list includes oral iron. If ongoing iron deficiency, patient may benefit from IV Iron supplementation to replenish the patient's iron stores given ongoing microcytic anemia.       # Elevated Liver Transaminases  Per Care Everywhere, alkaline phosphatase was elevated 3/1/2024 but AST and ALT were within normal limits. ALT elevation noted 5/10/2024 with liver enzymes remaining elevated.     -- 6/3/2024 CT Abdomen/Pelvis without Contrast reports normal liver, cholelithiasis, normal caliber biliary tree, normal pancreas, spleen with calcified granuloma, borderline splenomegaly similar to prior exam.   -- 6/7/2024 RUQ Ultrasound reports mildly increased echogenicity of the hepatic parenchyma relative to the right renal cortex may be seen in hepatic steatosis, questionable gallstones in the neck of the gallbladder with biliary sludge without evidence of acute cholecystitis; no gallbladder wall thickening, no pericholecystic fluid.     -- Total bilirubin, Platelets and Albumin are within normal limits.   -- INR elevated: 2.03.   --  Alkaline phosphatase elevated to 237 U/L.   -- ALT elevated to 317 U/L.   -- AST elevate to 243 U/L.  -- Hepatitis B Surface Antigen: Nonreactive.   -- Hepatitis A IgM Antibody: Nonreactive.  -- Hepatitis B Core IgM: Nonreactive.  -- Hepatitis C Antibody: nonreactive.    Elevated liver transaminases is nonspecific with differential including congestive hepatopathy (recommend RUQ ultrasound with Dopplers to further assess), medication adverse effect [home medications with adverse effects per UpToDate: Micafungin: Increased serum alkaline phosphatase (3% to 6%); Atorvastatin: Increased serum transaminases (?2%); Dalbavancin: Hepatotoxicity (<2%), increased serum alkaline phosphatase (<2%), increased serum transaminases (<2%)], autoimmune etiologies (GI ordered smooth muscle antibody) versus less likely viral. Will also check a PEth to confirm abstinence from alcohol.        Recommendations:  -- Occupational Therapy consult for cognitive evaluation, concern for memory deficits.     -- Appreciate Health Psychology involvement for mental health optimization.     -- Appreciate Palliative Team involvement for ongoing goals of care conversations.    -- Appreciate ID involvement regarding chronic driveline infection.  - Of note, patient reports never wanting to receive Dalbavancin again.     -- Patient would benefit from being provided a notebook to keep a stool diary to strictly document bowel movements including the time, consistency and amount of stool given memory concerns.     -- Strict documentation of rectal output by hospital staff while patient remains inpatient.  - Appreciate detailed documentation of stool appearance, including color, consistency, frequency and amount.    - Consider smearing stool thinly on white paper towel to distinguish color.     -- Avoid antidiarrheals at this time given no bowel movement in 2 days and to avoid inducing constipation.     For elevated transaminases:   -- RUQ Ultrasound with  Dopplers to assess for congestive hepatopathy, elevated liver transaminases.   -- GI ordered: Smooth muscle antibody pending.   -- GI ordered: PEth pending to confirm abstinence from alcohol.   -- Continue to trend daily Hepatic Function Panel.     -- Defer additional hematologic anemia work-up to primary team.  - If ongoing iron deficiency, patient may benefit from aggressive IV Iron supplementation to replenish the patient's iron stores given ongoing microcytic anemia. Could consider hematology consult for assistance.     -- RD Consult for nutritional evaluation, nutritional optimization, discuss adequate oral fiber intake, fiber supplementation options.   - Please avoid all artificial sugars (Aspartame, Sucralose, Acesulfame K, Saccharin, Xylitol), as these can lead to loose stools.   - Strict lactose free diet.     GI ordered stool tests:  -- Pending Infectious Stool Studies: Enteric Bacteria and Virus Panel PCR, Microsporidia.  -- To assess for osmotic diarrhea, pending stool sodium and stool potassium and utilize these values to calculate the stool osmotic gap using equation: 290 - 2 (Stool Sodium + Stool Potassium).  - An osmotic gap < 50 mOsm/kg is considered normal.  - An osmotic gap > 125 mOsm/kg is consistent with a pure osmotic diarrhea.   -- Pending Fecal Calprotectin.   -- Pending H. Pylori stool antigen.     -- Continue PTA Omeprazole 40 mg PO BID for chronic dyspepsia.  -- Please consider following an antireflux regimen. This includes:  - Do not lie down for at least 3 to 4 hours after meals.  - Raise the head of the bed 6 to 8 inches (35-45 degrees).  - Decrease excess weight.  - Avoid foods and liquids that cause symptoms.  - Avoid cigarettes and other tobacco products.     -- Continue Supportive Cares  - Adequate volume resuscitation with IVF, pRBCs.  - Monitor Hemoglobin closely. Transfuse to keep Hgb > 7 g/dL from GI standpoint.   - Monitor Platelets closely. Keep PLT > 50 10e3/uL from GI  standpoint.  - Maintain hemodynamics: MAP >65 mmHg and HR <100.  - Monitor and optimize electrolytes.  - Monitor and optimize nutrition. --> Diet per primary team. Appreciate RD nutrition recs.   - Reposition every 30 minutes while awake.  - Encourage Ambulation as able: 4-6 walks per day.   -- No indication for acute/emergent GI endoscopic intervention at this time.   -- Avoid NSAIDs.  -- Analgesics/Antiemetics per primary team.  -- If the patient experiences overt GI bleeding with hemodynamic instability, please page the GI Luminal Service (listed on [facility]).   -- The GI Luminal Service will not be actively rounding on this patient over the weekend. If any GI questions/concerns over the weekend, please page the GI Fellow on call (listed on [facility]).       Outpatient:   -- Patient endorses ongoing problem with hemorrhoids, so patient could consider following up with an outpatient Colorectal surgeon to discuss treatment options for hemorrhoids.  -- If patient desires, patient can continue to follow-up with their local gastroenterologist in [place], [place] if ongoing GI concerns.        COVID status: negative 5/23/2024.     Discussed plan with Dr. Joseph Majano, Cardiology 2 Fellow and Verna Millan PA-C - Infectious Disease.     Thank you for involving us in this patient's care. Please do not hesitate to contact the GI service with any questions or concerns.     The patient was discussed and plan agreed upon with Dr. Kaveh Meneses, GI Luminal Service staff physician.    Overall time spent on the date of this encounter preparing to see the patient (including chart review of available notes, clinical status events, imaging and labs); discussing, ordering, coordinating recommended medications, tests or procedures; communicating with other health care professionals; and documenting the above clinical information in the electronic medical record was 150 minutes.    Yoli Oscar PA-C  Inpatient Gastroenterology  "Service  [facility]  Text Page         History of Present Illness:   Patient seen and examined at 1600. History is obtained from patient and chart review.    Dandy Sands is a 63 year old male with a history of hypertension, hyperlipidemia, systemic lupus erythematosus, antiphospholipid syndrome on chronic warfarin, pulmonary embolism (on warfarin), CAD status-post PCI to LAD in 2005, in-stent re-stenosis with TISHA to mLAD and prox RCA 5/24/2022, ICM (EF 10-15%) status-post HM3 LVAD 7/28/2022, history of C. 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     The GI Luminal Service was consulted regarding: \"Chronic diarrhea, basic infectious workup at OSH was negative (C diff, stool PCR), help with evaluation of non-infectious causes, also having elevated LFTs without a clear origin (not volume overloaded, RUQ E/S not that remarkable).\"       First reports loose stools have only been occurring for 1 year since starting Dalbavancin every 2 week infusions. Reports loose stools are due to the Dalbavancin. Patient reports bowel movements every other day historically up until a year ago. Reports no bowel movement in 2 days. Reports this happens when he is in the hospital. Reports the diarrhea makes him want to quit everything, stop coming to the hospital and turn off the LVAD and \"just go.\"     At most, reports 5-7 loose stools during the day. No nocturnal bowel movements.     Denies nausea.     Reports history of acid reflux, heartburn, but well controlled on omeprazole 40 mg PO BID. Reports avoiding trigger foods as well.     Denies " abdominal pain.     Discussed differential as above. Patient expressed understanding and again reports the diarrhea is due to the Dalbavancin and that he never wants the medication again.         Previous Procedures:    6/16/2023 - EGD - [facility]  Indications:           Melena   Providers:             BILLY BAIG MD   Findings:       The Z-line was regular and was found 40 cm from the incisors.        The gastroesophageal flap valve was visualized endoscopically and        classified as Hill Grade III (minimal fold, loose to endoscope, hiatal        hernia likely).        The esophagus was normal.        A 2 cm hiatal hernia was present.        The entire examined stomach was normal.        There is no endoscopic evidence of bleeding, erythema, erythema or        inflammatory changes suggestive of gastritis, inflammation, mucosal        abnormalities, ulceration, angioectasia or Dieulafoy lesions in the        entire examined stomach.        The cardia and gastric fundus were normal on retroflexion.        Two non-bleeding superficial duodenal ulcers with no stigmata of        bleeding were found in the second portion of the duodenum. The largest        lesion was 7 mm in largest dimension.        Diffuse mild mucosal changes characterized by flattening were found in        the second portion of the duodenum.                                                                                    Impression:       - Z-line regular, 40 cm from the incisors.        - Gastroesophageal flap valve classified as Hill Grade III (minimal        fold, loose to endoscope, hiatal hernia likely).        - Normal esophagus.        - 2 cm hiatal hernia.        - Normal stomach.        - Non-bleeding duodenal ulcers with no stigmata of bleeding.        - No specimens collected.     _________________________________________________________________    5/23/2022 - Colonoscopy - Dr. Kaveh Meneses   Findings:       The colon  and TI appeared normal.        Internal hemorrhoids were found during retroflexion.                                                                                    Impression:            Colonoscopy performed for screening in a potential                          heart transplant candidate. He reports no FH of colon                          cancer.                          No polyps were seen.     _________________________________________________________________    3/26/2021 - EGD - [facility] [place] [place] - Dr. Aleks Mooney   Indication: Dyspepsia, Heartburn        3/26/2021 - Colonoscopy - [place] - Dr. Aleks Mooney  Indications: Abdominal pain in the left lower quadrant, chronic diarrhea.                                 Past Medical History:   Reviewed and edited as appropriate  Past Medical History:   Diagnosis Date    Antiphospholipid antibody syndrome (H24)     CAD (coronary artery disease)     Chronic systolic heart failure (H)     Ischemic cardiomyopathy     Mitral regurgitation     Systemic lupus erythematosus (H)             Past Surgical History:   Reviewed and edited as appropriate   Past Surgical History:   Procedure Laterality Date    COLONOSCOPY N/A 05/23/2022    Procedure: COLONOSCOPY;  Surgeon: Parth Meneses MD;  Location:  OR    CV CORONARY ANGIOGRAM N/A 05/24/2022    Procedure: Coronary Angiogram;  Surgeon: Mike Pope MD;  Location: [facility] CARDIAC CATH LAB    CV CORONARY ANGIOGRAM N/A 05/29/2022    Procedure: Coronary Angiogram;  Surgeon: Austin Bright MD;  Location: [facility] CARDIAC CATH LAB    CV CORONARY LITHOTRIPSY PCI Bilateral 05/24/2022    Procedure: Percutaneous Coronary Intervention - Lithotripsy;  Surgeon: Mike Pope MD;  Location: [facility] CARDIAC CATH LAB    CV INTRA AORTIC BALLOON N/A 06/17/2022    Procedure: Intraprocedure Aortic Balloon Pump Insertion;  Surgeon: Sherman Cerda MD;  Location: [facility] CARDIAC  CATH LAB    CV INTRA AORTIC BALLOON Right 07/03/2022    Procedure: Reposition of Subclavian Intraprocedure Aortic Balloon Pump;  Surgeon: Austin Bright MD;  Location:  HEART CARDIAC CATH LAB    CV INTRAVASULAR ULTRASOUND N/A 05/24/2022    Procedure: Intravascular Ultrasound;  Surgeon: Mike Pope MD;  Location:  HEART CARDIAC CATH LAB    CV PCI ANGIOPLASTY N/A 05/29/2022    Procedure: Percutaneous Transluminal Angioplasty;  Surgeon: Austin Bright MD;  Location:  HEART CARDIAC CATH LAB    CV PCI STENT DRUG ELUTING N/A 05/24/2022    Procedure: Percutaneous Coronary Intervention Stent;  Surgeon: Mike Pope MD;  Location:  HEART CARDIAC CATH LAB    CV RIGHT HEART CATH MEASUREMENTS RECORDED N/A 05/18/2022    Procedure: Right Heart Catheterization;  Surgeon: Justo Stewart MD;  Location:  HEART CARDIAC CATH LAB    CV RIGHT HEART CATH MEASUREMENTS RECORDED N/A 05/24/2022    Procedure: Right Heart Catheterization;  Surgeon: Mike Pope MD;  Location:  HEART CARDIAC CATH LAB    CV RIGHT HEART CATH MEASUREMENTS RECORDED N/A 05/29/2022    Procedure: Right Heart Catheterization;  Surgeon: Austin Bright MD;  Location:  HEART CARDIAC CATH LAB    CV RIGHT HEART CATH MEASUREMENTS RECORDED N/A 07/01/2022    Procedure: Right Heart Catheterization;  Surgeon: Mike Pope MD;  Location:  HEART CARDIAC CATH LAB    CV RIGHT HEART CATH MEASUREMENTS RECORDED N/A 09/23/2022    Procedure: Right Heart Catheterization;  Surgeon: Justo Stewart MD;  Location:  HEART CARDIAC CATH LAB    CV RIGHT HEART EXERCISE STRESS STUDY N/A 05/18/2022    Procedure: Stress Drug Study;  Surgeon: Justo Stewart MD;  Location:  HEART CARDIAC CATH LAB    CV SWAN CHOCO PROCEDURE N/A 06/17/2022    Procedure: [place] Procedure;  Surgeon: Sherman Cerda MD;  Location:  HEART CARDIAC CATH LAB    EP PACEMAKER OR ICD LEAD IMPLANT-ONE LEAD N/A 01/13/2023    Procedure: CRT-D Lead revision;  Surgeon:  Beckie Maurice MD;  Location: [facility] HEART CARDIAC CATH LAB    INCISION AND DRAINAGE CHEST WASHOUT, COMBINED N/A 07/11/2022    Procedure: Chest washout and closure;  Surgeon: Darnell Morgan MD;  Location: [facility] OR    INCISION AND DRAINAGE CHEST WASHOUT, COMBINED N/A 07/12/2022    Procedure: INCISION AND DRAINAGE, WOUND, CHEST, WITH IRRIGATION;  Surgeon: Darius Zamora MD;  Location: [facility] OR    INCISION AND DRAINAGE CHEST WASHOUT, COMBINED N/A 03/18/2023    Procedure: Incision and drainage of driveline;  Surgeon: Darius Zamora MD;  Location: [facility] OR    INJECT NERVE OTHER PERIPHERAL Left 01/16/2023    Procedure: Cryoablation of intercostal nerves;  Surgeon: Kenroy Nguyen MD;  Location: [facility] GI    INSERT ARTERIAL LINE  07/18/2022         INSERT INTRAAORTIC BALLOON PUMP Right 06/23/2022    Procedure: INSERTION, INTRA-AORTIC BALLOON PUMP RIGHT SUBCLAVIAN ARTERY.;  Surgeon: Hayden Veras MD;  Location: [facility] OR    INSERT VENTRICULAR ASSIST DEVICE LEFT (HEARTMATE II/III) MINIMALLY INVASIVE N/A 07/08/2022    Procedure: MEDIAN STERNOTOMY.  CARDIOPULMONARY BYPASS.  INTRAOPERATIVE TRANSESOPHAGEAL ECHOCARDIOGRAM.  IMPLANT LEFT VENTRICULAR ASSIST DEVICE HEARTMATE III.  PLACEMENT OF PERCUTANEOUS RIGHT VENTRICULAR ASSIST DEVICE.  TEMPORARY CHEST CLOSURE;  Surgeon: Darius Zamora MD;  Location: [facility] OR    IR CHEST TUBE PLACEMENT NON-TUNNELED RIGHT  08/01/2022    IRRIGATION AND DEBRIDEMENT CHEST WASHOUT, COMBINED N/A 07/13/2022    Procedure: IRRIGATION AND DEBRIDEMENT, CHEST;  Surgeon: Darius Zamora MD;  Location: [facility] OR    MIDLINE DOUBLE LUMEN PLACEMENT Left 09/11/2022    Mid line ok to use    MIDLINE DOUBLE LUMEN PLACEMENT Right 09/14/2022    5FR DL midline.    PICC DOUBLE LUMEN PLACEMENT Right 05/28/2022    right basilic 5 fr dl picc 40 cm    PICC DOUBLE LUMEN PLACEMENT Right 08/15/2022    Right Lateral, 41 total length, 3 cm external length    PICC SINGLE LUMEN PLACEMENT Right 11/01/2022    41cm (2cm  external), Lateral brachial vein, low SVC    PICC SINGLE LUMEN PLACEMENT Right 03/21/2023    Right lateral brachial, 40 cm              Social History:   The patient lives in [place], [place].    Alcohol: Denies.   Tobacco: Denies.   Illicit drugs: Smokes marijuana every evening to help with sleep.            Family History:   Patient's family history is reviewed today and is non-contributory    No family history on file.          Allergies:   Reviewed and edited as appropriate   No Known Allergies         Medications:     Current Facility-Administered Medications   Medication Dose Route Frequency Provider Last Rate Last Admin    aspirin EC tablet 81 mg  81 mg Oral Daily Darvin Santana MD   81 mg at 06/07/24 0852    atorvastatin (LIPITOR) tablet 40 mg  40 mg Oral Daily Darvin Santana MD   40 mg at 06/07/24 0852    ferrous sulfate (FEROSUL) tablet 325 mg  325 mg Oral Every Other Day Darvin Santana MD   325 mg at 06/07/24 0852    furosemide (LASIX) tablet 20 mg  20 mg Oral Daily Darvin Santana MD   20 mg at 06/07/24 0852    gabapentin (NEURONTIN) capsule 100 mg  100 mg Oral TID Darvin Santana MD   100 mg at 06/07/24 0852    HOLD nitroGLYcerin IF   Does not apply HOLD Darvin Santana MD        [START ON 6/8/2024] hydroxychloroquine (PLAQUENIL) half-tab 300 mg  300 mg Oral Daily Justo Stewart MD        lidocaine (LMX4) cream   Topical Q1H PRN Darvin Santana MD        lidocaine 1 % 0.1-1 mL  0.1-1 mL Other Q1H PRN Darvin Santana MD        multivitamin w/minerals (THERA-VIT-M) tablet 1 tablet  1 tablet Oral Daily Justo Stewart MD   1 tablet at 06/07/24 1123    pantoprazole (PROTONIX) EC tablet 40 mg  40 mg Oral BID AC Darvin Santana MD   40 mg at 06/07/24 0852    Patient is already receiving anticoagulation with heparin, enoxaparin (LOVENOX), warfarin (COUMADIN)  or other anticoagulant medication   Does not apply Continuous PRN Darvin Santana MD        potassium chloride hector  ER (KLOR-CON M10) CR tablet 20 mEq  20 mEq Oral Daily Darvin Santana MD   20 mEq at 06/07/24 0852    Reason ACE/ARB/ARNI order not selected   Other DOES NOT GO TO Darvin Smith MD        Reason beta blocker not prescribed   Does not apply DOES NOT GO TO Darvin Smith MD        sodium chloride (PF) 0.9% PF flush 3 mL  3 mL Intracatheter Q8H Darvin Santana MD   3 mL at 06/07/24 0626    sodium chloride (PF) 0.9% PF flush 3 mL  3 mL Intracatheter q1 min prn Darvin Santana MD        sulfamethoxazole-trimethoprim (BACTRIM DS) 800-160 MG per tablet 1 tablet  1 tablet Oral BID Darvin Santana MD   1 tablet at 06/07/24 0852    tamsulosin (FLOMAX) capsule 0.4 mg  0.4 mg Oral Daily Darvin Santana MD   0.4 mg at 06/07/24 0852    warfarin ANTICOAGULANT (COUMADIN) tablet 2.5 mg  2.5 mg Oral ONCE at 18:00 Justo Stewart MD        Warfarin Dose Required Daily - Pharmacist Managed  1 each Oral See Admin Instructions Darvin Sanatna MD                 Review of Systems:     A complete review of systems was performed and is negative except as noted in the HPI           Physical Exam:   Temp: 97.8  F (36.6  C) Temp src: Oral BP: 91/72 Pulse: 88   Resp: 18 SpO2: 98 % O2 Device: None (Room air)    Wt:   Wt Readings from Last 2 Encounters:   06/07/24 64.5 kg (142 lb 3.2 oz)   12/14/23 72.6 kg (160 lb)        General: 63 year old male lying in bed in NAD. Appears comfortable.  Answers appropriately.    HEENT: Head is AT/NC. Sclera anicteric.   Lungs: No increased work of breathing, speaking in full sentences, equal chest rise. On Room Air.   Heart: Regular rate. Peripheral perfusion intact. +LVAD present.   Abdomen: Soft, non-tender, non-distended.  No peritoneal signs.    Extremities: WWP.  Musculoskeletal: No gross deformity.  Skin: No jaundice or rash on exposed skin.  Neurologic: Grossly non-focal.  CN 2-12 grossly intact.   Mental status/Psych: A&O. Asks/answers questions appropriately.  Pleasant, interactive.             Data:   LAB WORK:    BMP  Recent Labs   Lab 06/07/24  0840 06/07/24  0237   * 132*   POTASSIUM 4.4 4.2   CHLORIDE 103 102   ELDA 9.4 9.4   CO2 18* 17*   BUN 28.5* 31.9*   CR 1.18* 1.14   GLC 89 96     CBC  Recent Labs   Lab 06/07/24  0840 06/07/24  0237   WBC 4.9 4.9   RBC 4.34* 4.27*   HGB 9.3* 9.0*   HCT 30.6* 29.8*   MCV 71* 70*   MCH 21.4* 21.1*   MCHC 30.4* 30.2*   RDW 22.7* 23.0*    197     INR  Recent Labs   Lab 06/07/24  0502 06/07/24  0237   INR 2.03* 1.86*     LFTs  Recent Labs   Lab 06/07/24  0237   ALKPHOS 237*   *   *   BILITOTAL 0.9   PROTTOTAL 7.3   ALBUMIN 4.3      PANCNo lab results found in last 7 days.    IMAGING:    Right upper quadrant ultrasound 6/7/2024 9:44 AM   HISTORY: Elevated LFTs of unknown origin.        TECHNIQUE: The abdomen was scanned in standard fashion with  specialized ultrasound transducer(s) using both gray-scale, color  Doppler, and spectral flow techniques.     COMPARISON: 5/20/2022 right upper quadrant ultrasound     FINDINGS:      Fluid: No evidence of ascites or pleural effusions.     Liver: The liver mildly increased echogenicity relative to the renal  cortical parenchyma, measuring 16.9 cm in craniocaudal dimension.  There is no focal mass.      Gallbladder: There is no wall thickening, pericholecystic fluid, or  positive sonographic Prater's sign. Questionable small gallstones near  the neck of the gallbladder. Biliary sludge is present.        Bile Ducts: No significant intrahepatic biliary dilatation.  The  common bile duct is not visualized this study.      Pancreas: Pancreas was unable to be visualized in this study.     Kidney: The right kidney measures 10.7 cm long. There is no  hydronephrosis or hydroureter, no shadowing renal calculi, cystic  lesion or mass.                                                                    IMPRESSION:  1. Mildly increased echogenicity of the hepatic parenchyma  relative to  the right renal cortex may be seen in hepatic steatosis.  2. Questionable gallstones in the neck of the gallbladder with biliary  sludge without evidence for acute cholecystitis.  3. The pancreas and common bile duct could not be visualized in this  study.      Prior to Admission Imaging at [facility]:     CT Abdomen Pelvis w/o Contrast  Order: [id number]  Narrative       Patient Name:   OLEG KAUR   YOB: 1960   Procedure: CT ABDOMEN PELVIS WITHOUT CONTRAST   Date of Service: 06/03/2024      EXAM: CT ABDOMEN PELVIS WITHOUT CONTRAST    MR #: [id number]  INDICATION: LVAD, chronic infection, eval site    ADDITIONAL INDICATION: Inguinal hernia repair    COMPARISON: 5/23/2024    TECHNIQUE:  No IV contrast. Standard coronal and sagittal reformats were reconstructed on a non-independent workstation.    FINDINGS:  LOWER THORAX:  Mild atelectasis/scarring and subpleural reticulation in the lung bases. Cardiomegaly. Left ventricular assistance device.    ABDOMEN/PELVIS:  Evaluation of the solid abdominal organs and bowel is limited due to the lack of IV and oral contrast.    Liver: Normal.    Gallbladder and biliary tree: Cholelithiasis. Normal caliber biliary tree.    Pancreas: Normal.      Adrenal glands: Normal.    Spleen: Calcified granuloma. Borderline splenomegaly similar to the prior exam.    Stomach and duodenum: Unremarkable.    Vasculature: Atherosclerotic calcification in the abdominal aorta and branching arteries. IVC filter.    Genitourinary: No hydronephrosis No ureteral calculus. Normal bladder. Grossly normal prostate.        Lymph nodes: Mildly prominent distal external iliac chain lymph nodes which are likely reactive.    Bowel: Normal caliber small and large bowel. No obstruction. No pericolonic inflammatory changes. The appendix is not identified. No evidence of acute appendicitis.    Peritoneal Space and Soft Tissues: Ascites in the pelvis decreased compared to  5/23/2024.    Bones: No lytic or blastic osseous lesions. Findings in the femoral heads consistent with avascular necrosis.    IMPRESSION:  1.  Mild pelvic ascites decreased compared to 5/23/2024.  2.  No acute abnormality in the abdomen or pelvis.        CT Abdomen Pelvis w/o Contrast  Order: [id number]  Narrative       Patient Name:   OLEG KAUR   YOB: 1960   Procedure: CT ABDOMEN PELVIS WITHOUT CONTRAST   Date of Service: 05/23/2024      EXAM: CT ABDOMEN PELVIS WITHOUT CONTRAST    INDICATION:LVAD, concern for infection      COMPARISON(S):  CT scan abdomen pelvis October 28, 2023    TECHNIQUE:  Axial images were obtained through the abdomen and pelvis without IV contrast. There are coronal and sagittal reformatted images. This study is limited without the use of IV contrast.    FINDINGS:  Surgical history includes a inguinal hernia repair, the patient has a left ventricular assist device.  Imaging is limited in the upper abdomen related to the left ventricular assist device.    The spleen is upper limits of normal for size. The pancreas is stable.  There is a gallstone in the gallbladder there are no secondary signs of cholecystitis and there is no biliary duct dilatation.  The liver is not enlarged. No definite hepatic masses are noted.    Right and left adrenal gland is stable.  There is no hydronephrosis or hydroureter. No renal or ureteral calculus is noted.  There is no aortic aneurysm. There is an inferior vena caval filter.  There is no enlarging abdominal and pelvic lymph nodes.    There is mild ascites within the abdomen pelvis this is increased in volume from the previous exam.    The colon is not distended and there is no colitis. The appendix is not identified. There is no small bowel obstruction or enteritis. The stomach is normal for the degree of distention.    The urinary bladder is normal. No pelvic masses are seen.    There are no acute osseous findings. There are  findings of left and right femoral head consistent with AVN.    IMPRESSION:    1. There is mild ascites abdomen pelvis increased from prior exam.  2. No small bowel obstruction, no colitis or enteritis.      =======================================================================

## 2024-06-08 LAB
ALBUMIN SERPL BCG-MCNC: 4.2 G/DL (ref 3.5–5.2)
ALP SERPL-CCNC: 236 U/L (ref 40–150)
ALT SERPL W P-5'-P-CCNC: 364 U/L (ref 0–70)
ANION GAP SERPL CALCULATED.3IONS-SCNC: 12 MMOL/L (ref 7–15)
AST SERPL W P-5'-P-CCNC: 288 U/L (ref 0–45)
BILIRUB SERPL-MCNC: 0.8 MG/DL
BUN SERPL-MCNC: 27.5 MG/DL (ref 8–23)
CALCIUM SERPL-MCNC: 9.3 MG/DL (ref 8.8–10.2)
CHLORIDE SERPL-SCNC: 103 MMOL/L (ref 98–107)
CREAT SERPL-MCNC: 1.25 MG/DL (ref 0.67–1.17)
DEPRECATED HCO3 PLAS-SCNC: 17 MMOL/L (ref 22–29)
EGFRCR SERPLBLD CKD-EPI 2021: 65 ML/MIN/1.73M2
ERYTHROCYTE [DISTWIDTH] IN BLOOD BY AUTOMATED COUNT: 22.6 % (ref 10–15)
ERYTHROCYTE [DISTWIDTH] IN BLOOD BY AUTOMATED COUNT: 22.8 % (ref 10–15)
GLUCOSE SERPL-MCNC: 104 MG/DL (ref 70–99)
HCT VFR BLD AUTO: 31 % (ref 40–53)
HCT VFR BLD AUTO: 31 % (ref 40–53)
HGB BLD-MCNC: 9.1 G/DL (ref 13.3–17.7)
HGB BLD-MCNC: 9.2 G/DL (ref 13.3–17.7)
INR PPP: 1.73 (ref 0.85–1.15)
MAGNESIUM SERPL-MCNC: 1.9 MG/DL (ref 1.7–2.3)
MCH RBC QN AUTO: 20.8 PG (ref 26.5–33)
MCH RBC QN AUTO: 21 PG (ref 26.5–33)
MCHC RBC AUTO-ENTMCNC: 29.4 G/DL (ref 31.5–36.5)
MCHC RBC AUTO-ENTMCNC: 29.7 G/DL (ref 31.5–36.5)
MCV RBC AUTO: 71 FL (ref 78–100)
MCV RBC AUTO: 71 FL (ref 78–100)
PHOSPHATE SERPL-MCNC: 3.6 MG/DL (ref 2.5–4.5)
PLATELET # BLD AUTO: 186 10E3/UL (ref 150–450)
PLATELET # BLD AUTO: 189 10E3/UL (ref 150–450)
POTASSIUM SERPL-SCNC: 4.1 MMOL/L (ref 3.4–5.3)
PROT SERPL-MCNC: 7.1 G/DL (ref 6.4–8.3)
RBC # BLD AUTO: 4.37 10E6/UL (ref 4.4–5.9)
RBC # BLD AUTO: 4.39 10E6/UL (ref 4.4–5.9)
SODIUM SERPL-SCNC: 132 MMOL/L (ref 135–145)
UFH PPP CHRO-ACNC: 0.19 IU/ML
WBC # BLD AUTO: 4.6 10E3/UL (ref 4–11)
WBC # BLD AUTO: 5 10E3/UL (ref 4–11)

## 2024-06-08 PROCEDURE — 85520 HEPARIN ASSAY: CPT | Performed by: STUDENT IN AN ORGANIZED HEALTH CARE EDUCATION/TRAINING PROGRAM

## 2024-06-08 PROCEDURE — 250N000013 HC RX MED GY IP 250 OP 250 PS 637: Performed by: PHYSICIAN ASSISTANT

## 2024-06-08 PROCEDURE — 99233 SBSQ HOSP IP/OBS HIGH 50: CPT | Performed by: PHYSICIAN ASSISTANT

## 2024-06-08 PROCEDURE — 85610 PROTHROMBIN TIME: CPT

## 2024-06-08 PROCEDURE — 120N000003 HC R&B IMCU UMMC

## 2024-06-08 PROCEDURE — 85027 COMPLETE CBC AUTOMATED: CPT | Performed by: STUDENT IN AN ORGANIZED HEALTH CARE EDUCATION/TRAINING PROGRAM

## 2024-06-08 PROCEDURE — 80053 COMPREHEN METABOLIC PANEL: CPT | Performed by: STUDENT IN AN ORGANIZED HEALTH CARE EDUCATION/TRAINING PROGRAM

## 2024-06-08 PROCEDURE — 85520 HEPARIN ASSAY: CPT | Performed by: PHYSICIAN ASSISTANT

## 2024-06-08 PROCEDURE — 250N000013 HC RX MED GY IP 250 OP 250 PS 637

## 2024-06-08 PROCEDURE — 85027 COMPLETE CBC AUTOMATED: CPT | Performed by: PHYSICIAN ASSISTANT

## 2024-06-08 PROCEDURE — 84100 ASSAY OF PHOSPHORUS: CPT | Performed by: STUDENT IN AN ORGANIZED HEALTH CARE EDUCATION/TRAINING PROGRAM

## 2024-06-08 PROCEDURE — 99418 PROLNG IP/OBS E/M EA 15 MIN: CPT | Performed by: PHYSICIAN ASSISTANT

## 2024-06-08 PROCEDURE — 250N000013 HC RX MED GY IP 250 OP 250 PS 637: Performed by: STUDENT IN AN ORGANIZED HEALTH CARE EDUCATION/TRAINING PROGRAM

## 2024-06-08 PROCEDURE — 250N000013 HC RX MED GY IP 250 OP 250 PS 637: Performed by: INTERNAL MEDICINE

## 2024-06-08 PROCEDURE — 83735 ASSAY OF MAGNESIUM: CPT | Performed by: STUDENT IN AN ORGANIZED HEALTH CARE EDUCATION/TRAINING PROGRAM

## 2024-06-08 PROCEDURE — 999N000128 HC STATISTIC PERIPHERAL IV START W/O US GUIDANCE

## 2024-06-08 PROCEDURE — 250N000011 HC RX IP 250 OP 636: Mod: JZ | Performed by: PHYSICIAN ASSISTANT

## 2024-06-08 RX ORDER — WARFARIN SODIUM 3 MG/1
3 TABLET ORAL
Status: COMPLETED | OUTPATIENT
Start: 2024-06-08 | End: 2024-06-08

## 2024-06-08 RX ORDER — HEPARIN SODIUM 10000 [USP'U]/100ML
0-5000 INJECTION, SOLUTION INTRAVENOUS CONTINUOUS
Status: DISCONTINUED | OUTPATIENT
Start: 2024-06-08 | End: 2024-06-11

## 2024-06-08 RX ADMIN — SULFAMETHOXAZOLE AND TRIMETHOPRIM 1 TABLET: 800; 160 TABLET ORAL at 20:10

## 2024-06-08 RX ADMIN — GABAPENTIN 100 MG: 100 CAPSULE ORAL at 13:54

## 2024-06-08 RX ADMIN — PANTOPRAZOLE SODIUM 40 MG: 40 TABLET, DELAYED RELEASE ORAL at 16:13

## 2024-06-08 RX ADMIN — FUROSEMIDE 20 MG: 20 TABLET ORAL at 07:50

## 2024-06-08 RX ADMIN — SULFAMETHOXAZOLE AND TRIMETHOPRIM 1 TABLET: 800; 160 TABLET ORAL at 07:49

## 2024-06-08 RX ADMIN — GABAPENTIN 100 MG: 100 CAPSULE ORAL at 20:10

## 2024-06-08 RX ADMIN — HYDROXYZINE HYDROCHLORIDE 10 MG: 10 TABLET ORAL at 22:19

## 2024-06-08 RX ADMIN — GABAPENTIN 100 MG: 100 CAPSULE ORAL at 07:51

## 2024-06-08 RX ADMIN — Medication 1 TABLET: at 07:51

## 2024-06-08 RX ADMIN — ASPIRIN 81 MG: 81 TABLET ORAL at 07:49

## 2024-06-08 RX ADMIN — Medication 300 MG: at 07:51

## 2024-06-08 RX ADMIN — WARFARIN SODIUM 3 MG: 3 TABLET ORAL at 17:59

## 2024-06-08 RX ADMIN — PANTOPRAZOLE SODIUM 40 MG: 40 TABLET, DELAYED RELEASE ORAL at 07:50

## 2024-06-08 RX ADMIN — Medication 12.5 MG: at 22:20

## 2024-06-08 RX ADMIN — TAMSULOSIN HYDROCHLORIDE 0.4 MG: 0.4 CAPSULE ORAL at 07:51

## 2024-06-08 RX ADMIN — HYDROXYZINE HYDROCHLORIDE 10 MG: 10 TABLET ORAL at 01:09

## 2024-06-08 RX ADMIN — HEPARIN SODIUM 900 UNITS/HR: 10000 INJECTION, SOLUTION INTRAVENOUS at 17:59

## 2024-06-08 RX ADMIN — POTASSIUM CHLORIDE 20 MEQ: 750 TABLET, EXTENDED RELEASE ORAL at 07:50

## 2024-06-08 RX ADMIN — HEPARIN SODIUM 750 UNITS/HR: 10000 INJECTION, SOLUTION INTRAVENOUS at 10:49

## 2024-06-08 ASSESSMENT — ACTIVITIES OF DAILY LIVING (ADL)
ADLS_ACUITY_SCORE: 46

## 2024-06-08 NOTE — PROGRESS NOTES
[facility]   Cardiology II Service / Advanced Heart Failure  Daily Progress Note      Patient: Dandy Sands  MRN: [id number]  Admission Date: 6/7/2024  Hospital Day # 1    Assessment and Plan: Dandy Sands is a 63 year old male admitted on 6/7/2024 as a transfer from [place] where he was most recently admitted 05/31/2024 for weakness, malaise, and persistent diarrhea in setting of chronic driveline infection now transferred to [facility] for ongoing ZAKI cares. He has a past medical history significant for ICM s/p HM3 placement 07/08/2022 at [facility], CAD s/p PCI to LAD 2005, in-stent restenosis with TISHA to mLAD and prox RCA 05/24/2022, HLD, c. Diff 09/2022, SLE, antiphospholipid syndrome, chronic anemia, anxiety, and other conditions.     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 At that time they were opting for ongoing treatment with dalbavancin, micafungin, and bactrim, although they were less certain that candida was active contributor vs colonizer. 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 Per review of previous 2023 discharge summary also had 1+ corynebacterium striatum from OR wound cultures at that time.  Per OSH radiology read 06/03/2024 of CT abdomen/pelvis w/o contrast, no acute abnormalities but with mild ascites.   BC 05/31/2024: negative at [facility]  - Please see bottom/data section for copied positive [facility] culture results dating back through 2022  P:  - Of note when forumulating plan, patient has significant objections  to continuing Dalbavancin as he attributes this to causing his diarrhea  - Consult to ID, now signed off. Will need to re-engage if patient remains   - Continue micafungin 100 mg IV Q24H through 06/12 with consideration of fluconazole for suppression after  - Continue dalbavancin (last dose 05/31), gets every two weeks so due 6/14   - Continue Bactrim 800-160 BID through 06/11 then consider suppressive dosing   Somewhat unclear exactly what regimen he has been on over last hospital stay and other recent stay, although not a comprehensive list brief review reveals the following:   Vancomycin (05/23-05/24)  Micafungin (05/24-05/27, 06/02-**)  Fluconazole (05/27-06/01)  Bactrim (05/31-**)  Cefepime (5/23 - 5/27)  Dalbavancin (06/2023-present)    Ciprofloxacain (leading up to 05/23 admission was on 250 mg PO PTA for suppression, unclear duration)      #Ischemic Cardiomyopathy s/p HM3 LVAD 07/08/2022 at [facility]  #S/p CRT-D (2006) s/p RV lead upgrade 1/13/23   s/p LVAD 7/8/12 (Likely medtronic device remains from 03/2006 placement although not certain)   #CAD s/p PCI to LAD 2005, insent restenosis and TISHA mLAD, Prox RCA 05/2022  History of ischemic cardiomyopathy s/p chronic driveline infections as above.   - LVAD RPM set to 5100 at time of admission  - Pharmacy to dose warfarin  - Previously documented INR goal has been 2.0 to 3.0     ACEi/ARB/ARNi: None recently, historically was on lisinopril and hydralazine but these were stopped due to dizziness and low maps  BB: historical documentation reflects post-operative RV failure, hypotension, and low flows resulting in none prescribed  SGLT2i: none currently, previous documentation reflects concern for immunosuppression and concern for infections/unclear evidence in LVAD patients  MRA: none, reportedly due to low maps  Loop Diuretics:   - Continue PTA 20 mg lasix PO daily- goal of net even  - Mag to 2.0, K to 4.0   - Plan for repeat TTE and plan for RHC next week      #Congestive Hepatopathy?  AST/ALT have been upper 100s to 300s since at least 06/01 at [facility], results here on admission /. Previously thought related to congestive hepatopathy. Bilirubin WNL, alk phos 237. RUQ ultrasound with mildly increased echogenicity of the hepatic parenchyma with possible hepatic steatosis.  - Trend LFTs daily  - RHC 6/10 or 6/11  - F/up smooth muscle antibody pending and peth (ordered by GI)    #Hemorrhoids  Patient interested in having these surgically fixed if possible as he states the surgeons at [facility] will not do an operation to fix them because he has a LVAD  - Consider surgical consult pending clinical course      #Diarrhea  Patient reports significant history of frequent diarrhea since being on antibiotics for his LVAD, which per his report he has been on nearly since time of placement. His most significant concern and what he finds most puzzling is that it typically goes away in the hospital and returns once home. He believes it may be due to dalbavancin although he did get a dose 05/31 and has not had diarrhea in some time so unclear. This is incredibly life limiting for him as he states he cannot even make it 1 hour without need for BM  - Enteric panel and C. Diff negative 05/23/2024 at [facility]. Now positive for norovirus, but having formed stools, so not correlating clinically  - Stool sodium and potassium are pending, will calculate the osmotic  stool gap  - Pending fecal calprotectin  - Pending H. Pylori stool antigen  - No lactose diet  - Consider additional workup for non-infectious causes of diarrhea if returns     #C. Diff  Has had 2 admission for C. Diff 09/2022, negative 05/23/2024.   - CTM     #HLD  - Continue PTA atorvastatin 40 mg PO daily    #SLE, antiphospholipid syndrome  - Continue PTA hydroxychloroquine   - heparin gtt d/t subtheraputic  inr     #Chronic anemia  Per note review runs 8.0-9.0 chronically  - Sending LDH and haptoglobin along with  admission CBC     #Code Status  Discussed this with patient during admission. He quite explicitly does not want chest compressions or to be resuscitated in a cardiac arrest situation, although would be ok with pre-arrest intubation. We discussed at some length how this code status creates a unique situation for him given presence of LVAD and how this would preclude chest compressions and that his ICD would likely shock him out of arrhythmias should they arise. He is accepting of this at the present time and understands that he may get a shock from his ICD. Therefore the most reasonable code status that closely matches his wishes seems to be DNR with an ok for pre-arrest intubation only.    - Appreciate palliative care visit  - DNR  - Ok for pre-arrest intubation  - Understands and accepts he may get shocks from his ICD  - Health psychology consult ordered      Diet: Fluid restriction 2000 ML FLUID  2 Gram Sodium Diet    lactose free diet  DVT Prophylaxis: Warfarin  Fitzgerald Catheter: Not present  Cardiac Monitoring: None  Code Status:  DNR, ok for pre-arrest intubation    Laila España PA-C  Advanced Heart Failure/Cardiology II Service  Vocera preferred, or pager [phone]      Patient discussed with Dr. Prabhakar.        75 minutes spent on the date of the encounter doing chart review, history and exam, documentation and further activities per the note    ================================================================    Subjective/24-Hr Events:   Last 24 hr care team notes reviewed. He is feeling very well. No diarrhea since 5/31. No abdominal pain. No nausea. His breathings feels fine. Maybe some abdominal edema. No Le edema. No lightheadedness right when he stands up, but otherwise no dizziness, pre-syncpope or syncope.    ROS:  4 point ROS including respiratory, CV, GI and  (other than that noted in the HPI) is negative.     Medications: Reviewed in EPIC.     Physical Exam:   /83 (BP Location: Right  "arm)   Pulse 88   Temp 97.5  F (36.4  C) (Oral)   Resp 16   Ht 1.702 m (5' 7\")   Wt 62.9 kg (138 lb 9.6 oz)   SpO2 99%   BMI 21.71 kg/m      GENERAL: Appears comfortable, in no distress .  HEENT: Eye symmetrical, no discharge or icterus bilaterally.   NECK: Supple, JVD <6.   CV: home of lvad, no adventitious lung sounds  RESPIRATORY: Respirations regular, even, and unlabored. Lungs CTA throughout.    GI: Soft and non distended   EXTREMITIES: No peripheral edema.   NEUROLOGIC: Alert and interacting appropriatly.  MUSCULOSKELETAL: No joint swelling or tenderness.   SKIN: No jaundice.     Labs:  CMP  Recent Labs   Lab 06/08/24  0435 06/07/24  0840 06/07/24  0237   * 134* 132*   POTASSIUM 4.1 4.4 4.2   CHLORIDE 103 103 102   CO2 17* 18* 17*   ANIONGAP 12 13 13   * 89 96   BUN 27.5* 28.5* 31.9*   CR 1.25* 1.18* 1.14   GFRESTIMATED 65 70 73   ELDA 9.3 9.4 9.4   MAG 1.9  --  2.1   PHOS 3.6  --  3.7   PROTTOTAL 7.1  --  7.3   ALBUMIN 4.2  --  4.3   BILITOTAL 0.8  --  0.9   ALKPHOS 236*  --  237*   *  --  243*   *  --  317*       CBC  Recent Labs   Lab 06/08/24  1049 06/08/24  0435 06/07/24  1801 06/07/24  0840   WBC 4.6 5.0 5.3 4.9   RBC 4.39* 4.37* 4.51 4.34*   HGB 9.2* 9.1* 9.5* 9.3*   HCT 31.0* 31.0* 31.7* 30.6*   MCV 71* 71* 70* 71*   MCH 21.0* 20.8* 21.1* 21.4*   MCHC 29.7* 29.4* 30.0* 30.4*   RDW 22.8* 22.6* 23.3* 22.7*    186 201 180       INR  Recent Labs   Lab 06/08/24  0435 06/07/24  0502 06/07/24  0237   INR 1.73* 2.03* 1.86*                 "

## 2024-06-08 NOTE — PLAN OF CARE
Shift 1900 - 0730    62 yo male transferred from OSH for weakness, malaise, diarrhea.  HF secondary to ICM with HM3 LVAD (placed on 7/8/2022).  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      General: AOx4. Able to verbalize needs. Uses call light appropriately.   Isolation: Contact / enteric precautions maintained.  Cardiac: Denies chest pain, dizziness. Afebrile. Atrial fib with BBB.  Doppler MAP 75-80.  LVAD #s WDL.  No alarms noted.   Respiratory: Denies SOB.  Unlabored.  CTAB.  SpO2 > 95% on RA.   GI: Denies N/V.  2 g NA diet.  2 L fluid restriction.  Last BM 6/8/2024, formed.  : Voids spontaneously without difficulty.  Urine output adequate.  Uses urinal so that nursing can record I&O.  Skin:  Drive line dressing CDI. New securement device applied.   Lines: L midline catheter SL.     Shift Events:  Stool resulted + for Norovirus. MD notified.  Pt states he takes Ativan at night to sleep.  Received one time order.

## 2024-06-08 NOTE — PHARMACY-CONSULT NOTE
Pharmacy was consulted to identify medications that may be contributing to elevated LFTs.  A thorough evaluation of the patient's medication list was performed and the following was noted:     Medications with a high incidence of LFT elevations:   Atorvastatin  Acetaminophen (on patient's home medication list as PRN - would be worth investigating how frequently the patient utilizes acetaminophen outside the hospital)    Medications with a low incidence of LFT elevations (~1%):   Ferrous sulfate  Pantoprazole    Marcos Taylor, SaravananD

## 2024-06-08 NOTE — UTILIZATION REVIEW
Admission Status; Secondary Review Determination    Under the authority of the Utilization Management Committee, the utilization review process indicated a secondary review on the above patient. The review outcome is based on review of the medical records, discussions with staff, and applying clinical experience noted on the date of the review.    (x) Inpatient Status Appropriate - This patient's medical care is consistent with medical management for inpatient care and reasonable inpatient medical practice.    RATIONALE FOR DETERMINATION: Complex 63-year-old male with known severe cardiomyopathy, ongoing IV LAD in place, history of C. difficile September 2022, SLE, antiphospholipid syndrome, chronic anemia, anxiety with most recently dealing with a chronic driveline infection.  This has been recently associated with weakness, malaise and persistent intractable severe diarrhea occurring every hour essentially making patient housebound.  Concern for diarrhea attributed to one of his antibiotics dalbavancin.  Patient has had multiple organisms cultured the most recent Serratia marcescens first isolated and summer 2023 and most recently 2 weeks ago along with Candida parapsilosis, other organisms last year noted included E. coli corynebacterium striatum.  Concern for potential need to adjust antibiotic regimen due to patient's severe diarrhea.  With risk of stopping suppressive antibiotic LVAD cares altogether.  Patient with worsening transaminases.  Will need further imaging evaluation of the hepatic region to clarify etiology of abnormalities as well as pursue gastroenterology evaluation and recommendations as well as institute infectious disease recommendations.  Patient transferred from outlying hospital due to the severity of patient's baseline condition and ongoing symptoms appropriate for inpatient care.    At the time of admission with the information available to the attending physician more than 2 nights  Hospital complex care was anticipated, based on patient risk of adverse outcome if treated as outpatient and complex care required. Inpatient admission is appropriate based on the Medicare guidelines.    This document was produced using voice recognition software    The information on this document is developed by the utilization review team in order for the business office to ensure compliance. This only denotes the appropriateness of proper admission status and does not reflect the quality of care rendered.    The definitions of Inpatient Status and Observation Status used in making the determination above are those provided in the CMS Coverage Manual, Chapter 1 and Chapter 6, section 70.4.    Sincerely,    Ajith Luu MD  Utilization Review  Physician Advisor  [facility].

## 2024-06-08 NOTE — PROGRESS NOTES
Admitted 6/6 from [facility] with weakness, malaise, and c/o ongoing diarrhea.     Neuro: A&Ox4.  Cardiac: Afebrile, VSS. SR with BBB, 1st degree AVB and occasional PAC's. Doppler MAP's 70-80's.    Respiratory: RA   GI/: Voiding spontaneously. No BM this shift.   Diet/appetite: lactose free, 2g na, 2L FR diet. Denies nausea   Activity: Up with SBA  Pain: Denies   Skin: No new deficits noted.   Lines: L SL midline, R PIV   Drains: none   Replacement: none     Started heparin gtt for INR 1.73. 10a check next at 2330  Gtt at 900ml/hr  -Planning RHC Monday or Tuesday

## 2024-06-09 ENCOUNTER — APPOINTMENT (OUTPATIENT)
Dept: OCCUPATIONAL THERAPY | Facility: CLINIC | Age: 64
DRG: 286 | End: 2024-06-09
Attending: STUDENT IN AN ORGANIZED HEALTH CARE EDUCATION/TRAINING PROGRAM
Payer: COMMERCIAL

## 2024-06-09 LAB
ALBUMIN SERPL BCG-MCNC: 4.2 G/DL (ref 3.5–5.2)
ALP SERPL-CCNC: 241 U/L (ref 40–150)
ALT SERPL W P-5'-P-CCNC: 419 U/L (ref 0–70)
ANION GAP SERPL CALCULATED.3IONS-SCNC: 15 MMOL/L (ref 7–15)
AST SERPL W P-5'-P-CCNC: 320 U/L (ref 0–45)
BILIRUB DIRECT SERPL-MCNC: 0.43 MG/DL (ref 0–0.3)
BILIRUB SERPL-MCNC: 0.8 MG/DL
BUN SERPL-MCNC: 23.5 MG/DL (ref 8–23)
CALCIUM SERPL-MCNC: 9.3 MG/DL (ref 8.8–10.2)
CHLORIDE SERPL-SCNC: 102 MMOL/L (ref 98–107)
CREAT SERPL-MCNC: 1.48 MG/DL (ref 0.67–1.17)
DEPRECATED HCO3 PLAS-SCNC: 16 MMOL/L (ref 22–29)
EGFRCR SERPLBLD CKD-EPI 2021: 53 ML/MIN/1.73M2
ERYTHROCYTE [DISTWIDTH] IN BLOOD BY AUTOMATED COUNT: 22.7 % (ref 10–15)
GLUCOSE SERPL-MCNC: 100 MG/DL (ref 70–99)
HCT VFR BLD AUTO: 31.3 % (ref 40–53)
HGB BLD-MCNC: 9.3 G/DL (ref 13.3–17.7)
INR PPP: 1.79 (ref 0.85–1.15)
MAGNESIUM SERPL-MCNC: 1.8 MG/DL (ref 1.7–2.3)
MCH RBC QN AUTO: 20.9 PG (ref 26.5–33)
MCHC RBC AUTO-ENTMCNC: 29.7 G/DL (ref 31.5–36.5)
MCV RBC AUTO: 71 FL (ref 78–100)
MICROSPORID STL MOD TRI STAIN: NEGATIVE
PHOSPHATE SERPL-MCNC: 3.9 MG/DL (ref 2.5–4.5)
PLATELET # BLD AUTO: 205 10E3/UL (ref 150–450)
POTASSIUM SERPL-SCNC: 3.8 MMOL/L (ref 3.4–5.3)
POTASSIUM STL-SCNC: NORMAL MMOL/L
PROT SERPL-MCNC: 7.2 G/DL (ref 6.4–8.3)
RBC # BLD AUTO: 4.44 10E6/UL (ref 4.4–5.9)
SMA IGG SER-ACNC: 5 UNITS
SODIUM SERPL-SCNC: 133 MMOL/L (ref 135–145)
SODIUM STL-SCNC: NORMAL MMOL/L
UFH PPP CHRO-ACNC: 0.28 IU/ML
UFH PPP CHRO-ACNC: 0.38 IU/ML
UFH PPP CHRO-ACNC: 0.62 IU/ML
WBC # BLD AUTO: 4.8 10E3/UL (ref 4–11)

## 2024-06-09 PROCEDURE — 99233 SBSQ HOSP IP/OBS HIGH 50: CPT | Performed by: PHYSICIAN ASSISTANT

## 2024-06-09 PROCEDURE — 250N000013 HC RX MED GY IP 250 OP 250 PS 637

## 2024-06-09 PROCEDURE — 80053 COMPREHEN METABOLIC PANEL: CPT | Performed by: STUDENT IN AN ORGANIZED HEALTH CARE EDUCATION/TRAINING PROGRAM

## 2024-06-09 PROCEDURE — 97110 THERAPEUTIC EXERCISES: CPT | Mod: GO | Performed by: OCCUPATIONAL THERAPIST

## 2024-06-09 PROCEDURE — 99418 PROLNG IP/OBS E/M EA 15 MIN: CPT | Performed by: PHYSICIAN ASSISTANT

## 2024-06-09 PROCEDURE — 85027 COMPLETE CBC AUTOMATED: CPT | Performed by: STUDENT IN AN ORGANIZED HEALTH CARE EDUCATION/TRAINING PROGRAM

## 2024-06-09 PROCEDURE — 250N000013 HC RX MED GY IP 250 OP 250 PS 637: Performed by: PHYSICIAN ASSISTANT

## 2024-06-09 PROCEDURE — 83735 ASSAY OF MAGNESIUM: CPT | Performed by: STUDENT IN AN ORGANIZED HEALTH CARE EDUCATION/TRAINING PROGRAM

## 2024-06-09 PROCEDURE — 120N000003 HC R&B IMCU UMMC

## 2024-06-09 PROCEDURE — 97535 SELF CARE MNGMENT TRAINING: CPT | Mod: GO | Performed by: OCCUPATIONAL THERAPIST

## 2024-06-09 PROCEDURE — 258N000003 HC RX IP 258 OP 636: Mod: JZ | Performed by: PHYSICIAN ASSISTANT

## 2024-06-09 PROCEDURE — 85520 HEPARIN ASSAY: CPT | Performed by: STUDENT IN AN ORGANIZED HEALTH CARE EDUCATION/TRAINING PROGRAM

## 2024-06-09 PROCEDURE — 250N000013 HC RX MED GY IP 250 OP 250 PS 637: Performed by: STUDENT IN AN ORGANIZED HEALTH CARE EDUCATION/TRAINING PROGRAM

## 2024-06-09 PROCEDURE — 250N000011 HC RX IP 250 OP 636: Mod: JZ | Performed by: PHYSICIAN ASSISTANT

## 2024-06-09 PROCEDURE — 84100 ASSAY OF PHOSPHORUS: CPT | Performed by: STUDENT IN AN ORGANIZED HEALTH CARE EDUCATION/TRAINING PROGRAM

## 2024-06-09 PROCEDURE — 85610 PROTHROMBIN TIME: CPT

## 2024-06-09 PROCEDURE — 97129 THER IVNTJ 1ST 15 MIN: CPT | Mod: GO | Performed by: OCCUPATIONAL THERAPIST

## 2024-06-09 PROCEDURE — 250N000013 HC RX MED GY IP 250 OP 250 PS 637: Performed by: INTERNAL MEDICINE

## 2024-06-09 PROCEDURE — 97165 OT EVAL LOW COMPLEX 30 MIN: CPT | Mod: GO | Performed by: OCCUPATIONAL THERAPIST

## 2024-06-09 PROCEDURE — 82248 BILIRUBIN DIRECT: CPT | Performed by: PHYSICIAN ASSISTANT

## 2024-06-09 RX ORDER — DIPHENHYDRAMINE HYDROCHLORIDE 50 MG/ML
50 INJECTION INTRAMUSCULAR; INTRAVENOUS
Status: ACTIVE | OUTPATIENT
Start: 2024-06-09 | End: 2024-06-10

## 2024-06-09 RX ORDER — DIPHENHYDRAMINE HYDROCHLORIDE 50 MG/ML
50 INJECTION INTRAMUSCULAR; INTRAVENOUS
Status: DISCONTINUED | OUTPATIENT
Start: 2024-06-09 | End: 2024-06-09

## 2024-06-09 RX ORDER — METHYLPREDNISOLONE SODIUM SUCCINATE 125 MG/2ML
125 INJECTION, POWDER, LYOPHILIZED, FOR SOLUTION INTRAMUSCULAR; INTRAVENOUS
Status: DISCONTINUED | OUTPATIENT
Start: 2024-06-09 | End: 2024-06-09

## 2024-06-09 RX ORDER — WARFARIN SODIUM 2 MG/1
4 TABLET ORAL
Status: COMPLETED | OUTPATIENT
Start: 2024-06-09 | End: 2024-06-09

## 2024-06-09 RX ORDER — METHYLPREDNISOLONE SODIUM SUCCINATE 125 MG/2ML
125 INJECTION, POWDER, LYOPHILIZED, FOR SOLUTION INTRAMUSCULAR; INTRAVENOUS
Status: ACTIVE | OUTPATIENT
Start: 2024-06-09 | End: 2024-06-10

## 2024-06-09 RX ADMIN — ASPIRIN 81 MG: 81 TABLET ORAL at 07:50

## 2024-06-09 RX ADMIN — GABAPENTIN 100 MG: 100 CAPSULE ORAL at 07:50

## 2024-06-09 RX ADMIN — WARFARIN SODIUM 4 MG: 2 TABLET ORAL at 17:43

## 2024-06-09 RX ADMIN — PANTOPRAZOLE SODIUM 40 MG: 40 TABLET, DELAYED RELEASE ORAL at 07:50

## 2024-06-09 RX ADMIN — Medication 1 TABLET: at 07:50

## 2024-06-09 RX ADMIN — Medication 300 MG: at 07:49

## 2024-06-09 RX ADMIN — MICAFUNGIN SODIUM 100 MG: 50 INJECTION, POWDER, LYOPHILIZED, FOR SOLUTION INTRAVENOUS at 11:36

## 2024-06-09 RX ADMIN — GABAPENTIN 100 MG: 100 CAPSULE ORAL at 20:41

## 2024-06-09 RX ADMIN — FERROUS SULFATE TAB 325 MG (65 MG ELEMENTAL FE) 325 MG: 325 (65 FE) TAB at 07:50

## 2024-06-09 RX ADMIN — Medication 12.5 MG: at 20:41

## 2024-06-09 RX ADMIN — SULFAMETHOXAZOLE AND TRIMETHOPRIM 1 TABLET: 800; 160 TABLET ORAL at 20:41

## 2024-06-09 RX ADMIN — FUROSEMIDE 20 MG: 20 TABLET ORAL at 07:55

## 2024-06-09 RX ADMIN — POTASSIUM CHLORIDE 20 MEQ: 750 TABLET, EXTENDED RELEASE ORAL at 07:50

## 2024-06-09 RX ADMIN — HYDROXYZINE HYDROCHLORIDE 10 MG: 10 TABLET ORAL at 22:26

## 2024-06-09 RX ADMIN — TAMSULOSIN HYDROCHLORIDE 0.4 MG: 0.4 CAPSULE ORAL at 07:50

## 2024-06-09 RX ADMIN — GABAPENTIN 100 MG: 100 CAPSULE ORAL at 13:09

## 2024-06-09 RX ADMIN — SULFAMETHOXAZOLE AND TRIMETHOPRIM 1 TABLET: 800; 160 TABLET ORAL at 07:49

## 2024-06-09 ASSESSMENT — ACTIVITIES OF DAILY LIVING (ADL)
ADLS_ACUITY_SCORE: 46
PREVIOUS_RESPONSIBILITIES: MEAL PREP;HOUSEKEEPING;LAUNDRY;SHOPPING;MEDICATION MANAGEMENT;FINANCES;DRIVING
ADLS_ACUITY_SCORE: 46

## 2024-06-09 NOTE — PLAN OF CARE
Shift 1900 - 0730    62 yo male transferred from OSH for weakness, malaise, diarrhea.  HF secondary to ICM with HM3 LVAD (placed on 7/8/2022).  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      General: AOx4.   Cardiac:  Denies chest pain, dizziness.  Afib with BBB. LVAD #s WDL.  No alarms noted.   Respiratory: Denies SOB.  Unlabored.  CTAB.  SpO2 >95% on RA.  GI: Denies N/V.  No lactose, 2 g NA diet.  2 L fluid restriction. Last BM 6/8/2024, brown, soft.   : Voids spontaneously without difficulty.  Uses urinal so nursing cn record I&O.  Urine output adequate.   Skin:  Drive line dressing CDI.  Lines: L midline SL.  R PIV with heparin infusing (see MAR).     Shift Events:  No acute events over noc.

## 2024-06-09 NOTE — PROGRESS NOTES
06/09/24 1000   Appointment Info   Signing Clinician's Name / Credentials (OT) Kateryna Chávez OTR/L   Rehab Comments (OT) old LVAD 2022   Living Environment   People in Home alone   Current Living Arrangements house   Home Accessibility no concerns   Transportation Anticipated car, drives self;family or friend will provide   Living Environment Comments Lives alone w cat in [facility].   Self-Care   Usual Activity Tolerance fair  (1/4 block very fatigued)   Current Activity Tolerance fair   Regular Exercise No   Equipment Currently Used at Home shower chair;grab bar, tub/shower  (has a walker doesnt' use it)   Fall history within last six months no   Activity/Exercise/Self-Care Comment was doing CR but due to GI upset stopped. Feels ready to start again   Instrumental Activities of Daily Living (IADL)   Previous Responsibilities meal prep;housekeeping;laundry;shopping;medication management;finances;driving  ( 1x a month, kids help)   General Information   Onset of Illness/Injury or Date of Surgery 06/07/24   Referring Physician Dr Prabhakar   Patient/Family Therapy Goal Statement (OT) wants to ride his bike again   Additional Occupational Profile Info/Pertinent History of Current Problem 64 yo male transferred from OSH for weakness, malaise, diarrhea.  HF secondary to ICM with HM3 LVAD (placed on 7/8/2022).  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   Performance Patterns (Routines, Roles, Habits) family is @20min away   Existing Precautions/Restrictions cardiac   General Observations and Info activity ambulate  LVAD   Cognitive Status Examination   Orientation Status orientation to person, place and time   Affect/Mental Status (Cognitive) confabulatory   Follows Commands WFL   Safety Deficit minimal deficit   Memory Deficit severe deficit   Attention Deficit minimal deficit   Executive Function Deficit unable/difficult to assess   Cognitive Status Comments BNL  impulsive   Cognitive  Screens/Assessments   Cognitive Assessments Completed [facility] [[email or web address]]   Blessed Orientation-Memory-Concentration Test:  Total Weighted Score out of 28 12   Blessed Orientation-Memory-Concentration Test Norms 9-19 equals moderate impairment   Blessed Orientation-Memory-Concentration Interpretation BNL   recall of 1/5 dealyed recall, uable to recall more with a cue   [facility] Mental Status Exam (UMS):  Total Score out of /30 13   UMS Norms 1-20 equals dementia   SLUMS Domains assessed: orientation, memory, attention, executive functions   SLUMS Interpretation Pt BNL - has less than HS education although still in the norm range for dementia   Visual Perception   Visual Impairment/Limitations corrective lenses for reading   Sensory   Sensory Comments intact   Pain Assessment   Patient Currently in Pain No   Posture   Posture Comments intact   Range of Motion Comprehensive   Comment, General Range of Motion WFL   Strength Comprehensive (MMT)   Comment, General Manual Muscle Testing (MMT) Assessment generalized deconditioning, reports very little activity in the last 6-12 mo   Coordination   Fine Motor Coordination WFL   Bed Mobility   Comment (Bed Mobility) IND   Transfers   Transfer Comments IND   Balance   Balance Comments intact   Activities of Daily Living   Comment, BADL Assessment/Training IND, limited by IV lines   Comment, IADL Assessment/Training pt below baseline complex IADLs, has a lot of support at home although MDs concerned that further decline has happened   Clinical Impression   Criteria for Skilled Therapeutic Interventions Met (OT) Yes, treatment indicated   OT Diagnosis decreased safety with IADLs   OT Problem List-Impairments impacting ADL problems related to;activity tolerance impaired;cognition;strength   Assessment of Occupational Performance 1-3 Performance Deficits   Identified Performance Deficits med management, finances   Planned Therapy Interventions (OT) IADL retraining;progressive  activity/exercise   Clinical Decision Making Complexity (OT) problem focused assessment/low complexity   Risk & Benefits of therapy have been explained patient;participants included;participants voiced agreement with care plan;current/potential barriers reviewed;risks/benefits reviewed;care plan/treatment goals reviewed;evaluation/treatment results reviewed;daughter   OT Total Evaluation Time   OT Eval, Low Complexity Minutes (02627) 15   OT Goals   Therapy Frequency (OT) 5 times/week   OT Predicted Duration/Target Date for Goal Attainment 06/29/24   OT Goals Hygiene/Grooming;Upper Body Dressing;Bed Mobility;Toilet Transfer/Toileting;Home Management;Cognition   OT: Hygiene/Grooming modified independent;while standing   OT: Upper Body Dressing Modified independent;including set-up/clothing retrieval;using adaptive equipment  (including LVAD)   OT Discharge Planning   OT Discharge Recommendation (DC Rec) home with assist;home with outpatient cardiac rehab   OT Rationale for DC Rec Pt at or near baseline, has had a decline in strength and cognition over the last few months. Would benefit from OP PT or CR to increase strength and activity tolerance.

## 2024-06-09 NOTE — PROGRESS NOTES
[facility]   Cardiology II Service / Advanced Heart Failure  Daily Progress Note      Patient: Dandy Sands  MRN: [id number]  Admission Date: 6/7/2024  Hospital Day # 2    Assessment and Plan: Dandy Sands is a 63 year old male admitted on 6/7/2024 as a transfer from [place] where he was most recently admitted 05/31/2024 for weakness, malaise, and persistent diarrhea in setting of chronic driveline infection now transferred to [facility] for ongoing ZAKI cares. He has a past medical history significant for ICM s/p HM3 placement 07/08/2022 at [facility], CAD s/p PCI to LAD 2005, in-stent restenosis with TISHA to mLAD and prox RCA 05/24/2022, HLD, c. Diff 09/2022, SLE, antiphospholipid syndrome, chronic anemia, anxiety, and other conditions.     Today's Plan:  - Planning for RHC on Monday or Tuesday  - Trending LFTS  - HOLDING atorvastatin, protonix, iron pills, trending LFTs. 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 At that time they were opting for ongoing treatment with dalbavancin, micafungin, and bactrim, although they were less certain that candida was active contributor vs colonizer. 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 Per review of previous 2023 discharge summary also had 1+ corynebacterium striatum from OR wound cultures at that time.  Per OSH radiology read 06/03/2024 of CT abdomen/pelvis w/o contrast, no acute abnormalities but with  mild ascites.   BC 05/31/2024: negative at [place]  - Please see bottom/data section for copied positive [place] culture results dating back through 2022  P:  - Now willing to continue Dalbavancin as this does not correlate with his diarrhea  - Consult to ID, now signed off. Will need to re-engage if patient remains   - Continue micafungin 100 mg IV Q24H through 06/12 with consideration of fluconazole for suppression after  - Continue dalbavancin (last dose 05/31), gets every two weeks so due 6/14   - Continue Bactrim 800-160 BID through 06/11 then consider suppressive dosing (LFTs were rising before starting bactrim)    Somewhat unclear exactly what regimen he has been on over last hospital stay and other recent stay, although not a comprehensive list brief review reveals the following:   Vancomycin (05/23-05/24)  Micafungin (05/24-05/27, 06/02-**)  Fluconazole (05/27-06/01)  Bactrim (05/31-**)  Cefepime (5/23 - 5/27)  Dalbavancin (06/2023-present)    Ciprofloxacain (leading up to 05/23 admission was on 250 mg PO PTA for suppression, unclear duration)      #Ischemic Cardiomyopathy s/p HM3 LVAD 07/08/2022 at [facility]  #S/p CRT-D (2006) s/p RV lead upgrade 1/13/23   s/p LVAD 7/8/12 (Likely medtronic device remains from 03/2006 placement although not certain)   #CAD s/p PCI to LAD 2005, insent restenosis and TISHA mLAD, Prox RCA 05/2022  History of ischemic cardiomyopathy s/p chronic driveline infections as above.   - LVAD RPM set to 5100 at time of admission, no changes     ACEi/ARB/ARNi: None recently, historically was on lisinopril and hydralazine but these were stopped due to dizziness and low maps  BB: historical documentation reflects post-operative RV failure, hypotension, and low flows resulting in none prescribed  SGLT2i: none currently, previous documentation reflects concern for immunosuppression and concern for infections/unclear evidence in LVAD patients  MRA: none, reportedly due to low maps  Anticoagulation:  Continue coumadin with inr goal of 2.0-3.0, 1.75 today, continue heparin gtt for bridge  ASA: Not indicated  Volume status: Continue PTA 20 mg lasix PO daily  - Mag to 2.0, K to 4.0   - RHC tomorrow vs tuesday     #Abnormal LFTs  AST/ALT have been upper 100s to 300s since at least 06/01 at [place], results here on admission /. Previously thought related to congestive hepatopathy. Bilirubin WNL, alk phos 237. RUQ ultrasound with mildly increased echogenicity of the hepatic parenchyma with possible hepatic steatosis. Also considering that this could be drug induced.  - Appreicate GI consult  - Trend LFTs daily  - RHC 6/10 or 6/11  - HOLDING lipitor, protonix, and po iron.   - Continuing bactrim for now as that was started after the liver dysfunction was already happening  - F/up smooth muscle antibody pending and peth (ordered by GI)    #Diarrhea  Patient reports significant history of frequent diarrhea since being on antibiotics for his LVAD, which per his report he has been on nearly since time of placement. His most significant concern and what he finds most puzzling is that it typically goes away in the hospital and returns once home. He believes it may be due to dalbavancin although he did get a dose 05/31 and has not had diarrhea in some time so unclear. This is incredibly life limiting for him as he states he cannot even make it 1 hour without need for BM. Upon further review, he is eating a considerable amount of lactose (cheese quite regularly, childresns size ice cream cones, possibly sauces with cheese) and a lot of greesy food as well.  - Appreciate GI consult  - Enteric panel and C. Diff negative 05/23/2024 at [place]. Now positive for norovirus, but having formed stools, so not correlating clinically  - Stool sodium and potassium are pending, will calculate the osmotic  stool gap  - Pending fecal calprotectin  - Pending H. Pylori stool antigen  - No lactose diet  - RD consult for further  education  - I have provided him with a card to bring to restaurants and grocery stores to remind him and the restaurant what foods he can't have  - Consider additional workup for non-infectious causes of diarrhea if returns     #C. Diff  Has had 2 admission for C. Diff 09/2022, negative 05/23/2024. No current diarrhea  - CTM    #Hemorrhoids  Patient interested in having these surgically fixed if possible as he states the surgeons at [place] will not do an operation to fix them because he has a LVAD  - Consider surgical consult pending clinical course      #HLD  - Continue PTA atorvastatin 40 mg PO daily    #SLE, antiphospholipid syndrome  - Continue PTA hydroxychloroquine   - heparin gtt d/t subtheraputic  inr     #Chronic anemia  Per note review runs 8.0-9.0 chronically  - F/up LDH and haptoglobin along with admission CBC  - IV iron ordered for 6/10, one time dose but test dose prior     #Code Status  Discussed this with patient during admission. He quite explicitly does not want chest compressions or to be resuscitated in a cardiac arrest situation, although would be ok with pre-arrest intubation. We discussed at some length how this code status creates a unique situation for him given presence of LVAD and how this would preclude chest compressions and that his ICD would likely shock him out of arrhythmias should they arise. He is accepting of this at the present time and understands that he may get a shock from his ICD. Therefore the most reasonable code status that closely matches his wishes seems to be DNR with an ok for pre-arrest intubation only.    - Appreciate palliative care visit  - DNR  - Ok for pre-arrest intubation  - Understands and accepts he may get shocks from his ICD  - Health psychology consult ordered      Diet: Fluid restriction 2000 ML FLUID  2 Gram Sodium Diet    lactose free diet  DVT Prophylaxis: Warfarin  Fitzgerald Catheter: Not present  Cardiac Monitoring: None  Code Status:  DNR, ok for  "pre-arrest intubation    Laila España PA-C  Advanced Heart Failure/Cardiology II Service  [place] preferred, or pager [phone]      Patient discussed with Dr. Prabhakar.        80 minutes spent on the date of the encounter doing chart review, history and exam, documentation and further activities per the note    ================================================================    Subjective/24-Hr Events:   Last 24 hr care team notes reviewed. He is feeling very well. No diarrhea since 5/31. He did have one soft BM yesterday \"malted milkshake consistency\" which doesn't bother him. No abdominal pain. No nausea. His breathings feels fine. Maybe some abdominal edema. No Le edema. No lightheadedness right when he stands up, but otherwise no dizziness, pre-syncpope or syncope.    ROS:  4 point ROS including respiratory, CV, GI and  (other than that noted in the HPI) is negative.     Medications: Reviewed in EPIC.     Physical Exam:   BP (!) 87/66 (BP Location: Right arm)   Pulse 89   Temp 97.4  F (36.3  C) (Oral)   Resp 17   Ht 1.702 m (5' 7\")   Wt 63.5 kg (139 lb 15.9 oz)   SpO2 99%   BMI 21.93 kg/m      GENERAL: Appears comfortable, in no distress .  HEENT: Eye symmetrical, no discharge or icterus bilaterally.   NECK: Supple, JVD <6.   CV: home of lvad, no adventitious lung sounds  RESPIRATORY: Respirations regular, even, and unlabored. Lungs CTA throughout.    GI: Soft and non distended   EXTREMITIES: No peripheral edema.   NEUROLOGIC: Alert and interacting appropriatly.  MUSCULOSKELETAL: No joint swelling or tenderness.   SKIN: No jaundice.     Labs:  CMP  Recent Labs   Lab 06/09/24  0449 06/08/24  0435 06/07/24  0840 06/07/24  0237   * 132* 134* 132*   POTASSIUM 3.8 4.1 4.4 4.2   CHLORIDE 102 103 103 102   CO2 16* 17* 18* 17*   ANIONGAP 15 12 13 13   * 104* 89 96   BUN 23.5* 27.5* 28.5* 31.9*   CR 1.48* 1.25* 1.18* 1.14   GFRESTIMATED 53* 65 70 73   ELDA 9.3 9.3 9.4 9.4   MAG 1.8 1.9  --  " 2.1   PHOS 3.9 3.6  --  3.7   PROTTOTAL 7.2 7.1  --  7.3   ALBUMIN 4.2 4.2  --  4.3   BILITOTAL 0.8 0.8  --  0.9   ALKPHOS 241* 236*  --  237*   * 288*  --  243*   * 364*  --  317*       CBC  Recent Labs   Lab 06/09/24  0449 06/08/24  1049 06/08/24  0435 06/07/24  1801   WBC 4.8 4.6 5.0 5.3   RBC 4.44 4.39* 4.37* 4.51   HGB 9.3* 9.2* 9.1* 9.5*   HCT 31.3* 31.0* 31.0* 31.7*   MCV 71* 71* 71* 70*   MCH 20.9* 21.0* 20.8* 21.1*   MCHC 29.7* 29.7* 29.4* 30.0*   RDW 22.7* 22.8* 22.6* 23.3*    189 186 201       INR  Recent Labs   Lab 06/09/24  0449 06/08/24  0435 06/07/24  0502 06/07/24  0237   INR 1.79* 1.73* 2.03* 1.86*

## 2024-06-09 NOTE — PROGRESS NOTES
Major shift updates:    -heparin gtt therapeutic @ 700units/hr. 10a check tomorrow. INR 1.79 today.   -OT cog eval performed. Low scoring in short term memory. Consistent score with prior evals.   -NPO at [facility] for RHC tomorrow.   -trending LFT's, lipitor, protonix iron pills on hold to assess if these are culprit for liver injury. Possible hepatology consult tomorrow in LFT's still increasing.   -Afib rhythm rate controlled, normotensive doppler MAP's 70's-80's. VSS, RA, denies pain. HM3 WDL, no alarms. Dressing changed with sensitive kit.

## 2024-06-09 NOTE — PROGRESS NOTES
Short Blessed Test   Cognitive screening using the Short Blessed Test of cognitive function (SBT). Pt scored 12/28 indicating moderate impairment with memory and concentration.      Interpretation:   0-8 = normal to mild impairment,   9-19 = moderate impairment  20-28 = severe impairment.   *Pt scored 10/28 in Fall of 2022        [facility]     The SLUMS ([facility] Mental Status Exam) is a 30-point standardized cognitive screen used to identify the presence of cognitive deficits and/or to identify a change in cognition over time.  This screen assesses cognitive abilities in various domains.  [email or web address]     Patient's performance was as follows:     Total Score: 13  *Pt with less than HS education     Scoring If High School Educated If Less than High School Educated   Normal 27-30 25-30   Mild Neurocognitive Disorder 21-26 20-24   Dementia 1-20 1-19      Score Interpretation: Score places patient in the dementia category.  Patient demonstrates deficits in the following areas:  (attention, delayed recall, executive functions, clock drawing).      Pt with good insight into deficits.   Please note that this examination is used to screen individuals to look for the presence of cognitive deficits and to identify changes in cognition over time.  This is not a diagnosis.  This examination can be followed by further cognitive assessments if appropriate and deemed necessary.

## 2024-06-10 ENCOUNTER — APPOINTMENT (OUTPATIENT)
Dept: ULTRASOUND IMAGING | Facility: CLINIC | Age: 64
DRG: 286 | End: 2024-06-10
Attending: PHYSICIAN ASSISTANT
Payer: COMMERCIAL

## 2024-06-10 LAB
ALBUMIN SERPL BCG-MCNC: 4.2 G/DL (ref 3.5–5.2)
ALP SERPL-CCNC: 237 U/L (ref 40–150)
ALT SERPL W P-5'-P-CCNC: 381 U/L (ref 0–70)
ANION GAP SERPL CALCULATED.3IONS-SCNC: 13 MMOL/L (ref 7–15)
AST SERPL W P-5'-P-CCNC: 245 U/L (ref 0–45)
BILIRUB DIRECT SERPL-MCNC: 0.43 MG/DL (ref 0–0.3)
BILIRUB SERPL-MCNC: 0.7 MG/DL
BUN SERPL-MCNC: 20.6 MG/DL (ref 8–23)
CALCIUM SERPL-MCNC: 9.3 MG/DL (ref 8.8–10.2)
CALPROTECTIN STL-MCNT: 178 MG/KG (ref 0–49.9)
CHLORIDE SERPL-SCNC: 103 MMOL/L (ref 98–107)
CREAT SERPL-MCNC: 1.44 MG/DL (ref 0.67–1.17)
DEPRECATED HCO3 PLAS-SCNC: 17 MMOL/L (ref 22–29)
EGFRCR SERPLBLD CKD-EPI 2021: 55 ML/MIN/1.73M2
ERYTHROCYTE [DISTWIDTH] IN BLOOD BY AUTOMATED COUNT: 22.7 % (ref 10–15)
GLUCOSE SERPL-MCNC: 97 MG/DL (ref 70–99)
H PYLORI AG STL QL IA: NEGATIVE
HCT VFR BLD AUTO: 31.3 % (ref 40–53)
HGB BLD-MCNC: 9.3 G/DL (ref 13.3–17.7)
HGB BLD-MCNC: 9.5 G/DL (ref 13.3–17.7)
INR PPP: 1.81 (ref 0.85–1.15)
LABORATORY REPORT: NORMAL
MAGNESIUM SERPL-MCNC: 1.8 MG/DL (ref 1.7–2.3)
MCH RBC QN AUTO: 21 PG (ref 26.5–33)
MCHC RBC AUTO-ENTMCNC: 29.7 G/DL (ref 31.5–36.5)
MCV RBC AUTO: 71 FL (ref 78–100)
PATH REPORT.COMMENTS IMP SPEC: NORMAL
PATH REPORT.COMMENTS IMP SPEC: NORMAL
PATH REPORT.FINAL DX SPEC: NORMAL
PATH REPORT.MICROSCOPIC SPEC OTHER STN: NORMAL
PATH REPORT.MICROSCOPIC SPEC OTHER STN: NORMAL
PATH REPORT.RELEVANT HX SPEC: NORMAL
PETH INTERPRETATION: NORMAL
PHOSPHATE SERPL-MCNC: 3.4 MG/DL (ref 2.5–4.5)
PLATELET # BLD AUTO: 205 10E3/UL (ref 150–450)
PLPETH BLD-MCNC: <10 NG/ML
POPETH BLD-MCNC: <10 NG/ML
POTASSIUM SERPL-SCNC: 4 MMOL/L (ref 3.4–5.3)
PROT SERPL-MCNC: 7.2 G/DL (ref 6.4–8.3)
RBC # BLD AUTO: 4.42 10E6/UL (ref 4.4–5.9)
SODIUM SERPL-SCNC: 133 MMOL/L (ref 135–145)
UFH PPP CHRO-ACNC: 0.28 IU/ML
WBC # BLD AUTO: 4.1 10E3/UL (ref 4–11)

## 2024-06-10 PROCEDURE — 99233 SBSQ HOSP IP/OBS HIGH 50: CPT | Mod: 25 | Performed by: PHYSICIAN ASSISTANT

## 2024-06-10 PROCEDURE — 84100 ASSAY OF PHOSPHORUS: CPT | Performed by: STUDENT IN AN ORGANIZED HEALTH CARE EDUCATION/TRAINING PROGRAM

## 2024-06-10 PROCEDURE — C1751 CATH, INF, PER/CENT/MIDLINE: HCPCS | Performed by: INTERNAL MEDICINE

## 2024-06-10 PROCEDURE — 99233 SBSQ HOSP IP/OBS HIGH 50: CPT | Performed by: PHYSICIAN ASSISTANT

## 2024-06-10 PROCEDURE — 85520 HEPARIN ASSAY: CPT | Performed by: STUDENT IN AN ORGANIZED HEALTH CARE EDUCATION/TRAINING PROGRAM

## 2024-06-10 PROCEDURE — 82248 BILIRUBIN DIRECT: CPT | Performed by: PHYSICIAN ASSISTANT

## 2024-06-10 PROCEDURE — 93451 RIGHT HEART CATH: CPT | Performed by: INTERNAL MEDICINE

## 2024-06-10 PROCEDURE — 272N000001 HC OR GENERAL SUPPLY STERILE: Performed by: INTERNAL MEDICINE

## 2024-06-10 PROCEDURE — C1769 GUIDE WIRE: HCPCS | Performed by: INTERNAL MEDICINE

## 2024-06-10 PROCEDURE — 4A023N6 MEASUREMENT OF CARDIAC SAMPLING AND PRESSURE, RIGHT HEART, PERCUTANEOUS APPROACH: ICD-10-PCS | Performed by: INTERNAL MEDICINE

## 2024-06-10 PROCEDURE — 93451 RIGHT HEART CATH: CPT | Mod: 26 | Performed by: INTERNAL MEDICINE

## 2024-06-10 PROCEDURE — 85027 COMPLETE CBC AUTOMATED: CPT | Performed by: STUDENT IN AN ORGANIZED HEALTH CARE EDUCATION/TRAINING PROGRAM

## 2024-06-10 PROCEDURE — 250N000013 HC RX MED GY IP 250 OP 250 PS 637

## 2024-06-10 PROCEDURE — 250N000011 HC RX IP 250 OP 636: Mod: JZ | Performed by: PHYSICIAN ASSISTANT

## 2024-06-10 PROCEDURE — 90832 PSYTX W PT 30 MINUTES: CPT | Performed by: STUDENT IN AN ORGANIZED HEALTH CARE EDUCATION/TRAINING PROGRAM

## 2024-06-10 PROCEDURE — 250N000013 HC RX MED GY IP 250 OP 250 PS 637: Performed by: STUDENT IN AN ORGANIZED HEALTH CARE EDUCATION/TRAINING PROGRAM

## 2024-06-10 PROCEDURE — 250N000013 HC RX MED GY IP 250 OP 250 PS 637: Performed by: INTERNAL MEDICINE

## 2024-06-10 PROCEDURE — 80053 COMPREHEN METABOLIC PANEL: CPT | Performed by: STUDENT IN AN ORGANIZED HEALTH CARE EDUCATION/TRAINING PROGRAM

## 2024-06-10 PROCEDURE — 83735 ASSAY OF MAGNESIUM: CPT | Performed by: STUDENT IN AN ORGANIZED HEALTH CARE EDUCATION/TRAINING PROGRAM

## 2024-06-10 PROCEDURE — 93975 VASCULAR STUDY: CPT | Mod: 26 | Performed by: RADIOLOGY

## 2024-06-10 PROCEDURE — 99233 SBSQ HOSP IP/OBS HIGH 50: CPT

## 2024-06-10 PROCEDURE — 99418 PROLNG IP/OBS E/M EA 15 MIN: CPT | Mod: 25 | Performed by: PHYSICIAN ASSISTANT

## 2024-06-10 PROCEDURE — 120N000003 HC R&B IMCU UMMC

## 2024-06-10 PROCEDURE — 250N000013 HC RX MED GY IP 250 OP 250 PS 637: Performed by: PHYSICIAN ASSISTANT

## 2024-06-10 PROCEDURE — 85610 PROTHROMBIN TIME: CPT

## 2024-06-10 PROCEDURE — 85018 HEMOGLOBIN: CPT

## 2024-06-10 PROCEDURE — 250N000009 HC RX 250: Performed by: INTERNAL MEDICINE

## 2024-06-10 PROCEDURE — 258N000003 HC RX IP 258 OP 636: Mod: JZ | Performed by: PHYSICIAN ASSISTANT

## 2024-06-10 PROCEDURE — 93975 VASCULAR STUDY: CPT

## 2024-06-10 RX ORDER — WARFARIN SODIUM 2 MG/1
4 TABLET ORAL
Status: COMPLETED | OUTPATIENT
Start: 2024-06-10 | End: 2024-06-10

## 2024-06-10 RX ORDER — DIGOXIN 125 MCG
125 TABLET ORAL DAILY
Status: DISCONTINUED | OUTPATIENT
Start: 2024-06-10 | End: 2024-06-12

## 2024-06-10 RX ADMIN — Medication 12.5 MG: at 20:52

## 2024-06-10 RX ADMIN — HYDROXYZINE HYDROCHLORIDE 10 MG: 10 TABLET ORAL at 20:06

## 2024-06-10 RX ADMIN — DIGOXIN 125 MCG: 125 TABLET ORAL at 16:25

## 2024-06-10 RX ADMIN — MICAFUNGIN SODIUM 100 MG: 50 INJECTION, POWDER, LYOPHILIZED, FOR SOLUTION INTRAVENOUS at 11:42

## 2024-06-10 RX ADMIN — TAMSULOSIN HYDROCHLORIDE 0.4 MG: 0.4 CAPSULE ORAL at 07:52

## 2024-06-10 RX ADMIN — GABAPENTIN 100 MG: 100 CAPSULE ORAL at 13:15

## 2024-06-10 RX ADMIN — HEPARIN SODIUM 700 UNITS/HR: 10000 INJECTION, SOLUTION INTRAVENOUS at 23:37

## 2024-06-10 RX ADMIN — POTASSIUM CHLORIDE 20 MEQ: 750 TABLET, EXTENDED RELEASE ORAL at 07:53

## 2024-06-10 RX ADMIN — SODIUM CHLORIDE 975 MG: 9 INJECTION, SOLUTION INTRAVENOUS at 12:39

## 2024-06-10 RX ADMIN — ASPIRIN 81 MG: 81 TABLET ORAL at 07:52

## 2024-06-10 RX ADMIN — SULFAMETHOXAZOLE AND TRIMETHOPRIM 1 TABLET: 800; 160 TABLET ORAL at 19:36

## 2024-06-10 RX ADMIN — SODIUM CHLORIDE 25 MG: 9 INJECTION, SOLUTION INTRAVENOUS at 07:53

## 2024-06-10 RX ADMIN — GABAPENTIN 100 MG: 100 CAPSULE ORAL at 19:36

## 2024-06-10 RX ADMIN — Medication 1 TABLET: at 07:52

## 2024-06-10 RX ADMIN — FUROSEMIDE 20 MG: 20 TABLET ORAL at 07:52

## 2024-06-10 RX ADMIN — Medication 300 MG: at 07:52

## 2024-06-10 RX ADMIN — GABAPENTIN 100 MG: 100 CAPSULE ORAL at 07:52

## 2024-06-10 RX ADMIN — SULFAMETHOXAZOLE AND TRIMETHOPRIM 1 TABLET: 800; 160 TABLET ORAL at 07:52

## 2024-06-10 RX ADMIN — WARFARIN SODIUM 4 MG: 2 TABLET ORAL at 17:29

## 2024-06-10 ASSESSMENT — ACTIVITIES OF DAILY LIVING (ADL)
ADLS_ACUITY_SCORE: 46
DEPENDENT_IADLS:: INDEPENDENT
ADLS_ACUITY_SCORE: 46

## 2024-06-10 NOTE — PROGRESS NOTES
CLINICAL NUTRITION SERVICES - REASSESSMENT NOTE     Nutrition Prescription    RECOMMENDATIONS FOR MDs/PROVIDERS TO ORDER:  Recommend referral to outpatient RD to assist with multiple different diet restrictions(I.e. low sodium, low fat, low lactose)  Encourage PO efforts. Consider nutrition support if unable to tolerate at least 50% of meals or meet ~ 60% of estimated nutrition needs via PO intake    Malnutrition Status:    Severe malnutrition in the context of acute illness/disease    Recommendations already ordered by Registered Dietitian (RD):  Encourage 3 meals/day with protein sources  MVI + minerals daily  Encourage soluble fiber sources(I.e. banana, applesauce, toast, etc.)  Nutrition education: Reviewed lactose-controlled, low fat, low sodium diet recommendations. Pt appears to have difficulty comprehending the different diet recommendations and would likely benefit from further education. See details below. Written materials provided.    Future/Additional Recommendations:  Additional snacks/supplements pending PO adequacy/if pt agreeable  Monitor nutrition related findings and follow up per protocol     Provider Order- education about lactus free diet and how to avoid fatty foods, needs additional ed     EVALUATION OF THE PROGRESS TOWARD GOALS   Diet: NPO for procedure heart cath in am.     Intake/Tolerance: Patient consuming 100 % of 3 meal(s) daily.     NEW FINDINGS   Met with pt at bedside. Pt reports meals have been small d/t restricted diet. Pt reports he has been informed that he should follow a low lactose, low fat diet for the diarrhea he has been experiencing at home. Pt states he does not believe he typically eats a lot of lactose other than ice cream. Pt states he does not drink a lot of milk or eat a lot of cheese. However, also reports eating foods that contain cheese such as lasagna and pizza. Pt states he does not cook a lot for himself and has never cooked much. Frequently goes out to eat at  italian restaurants or [employer]. Additionally pt uses a lot of processed/prepackaged foods such as hamburger helper and rice-a-goran. Pt states he likes to bread and ly pork cutlets. However, when advised to try to prepare foods without frying pt states he doesn't ly foods often. Unclear if he is using an air fryer or not. Pt states it will be difficult for him to start to cook for himself more. Discussed he could try using frozen dinners but would want to find some that have <600g sodium per meal. Additionally, discussed making foods at home such as soup with low sodium broth and freezing extra so that he can get multiple meals out of it. Pt seems to have a difficult time understanding different diet recommendations. Pt states his son will be going grocery shopping with him to assist him. Pt states he does not drink soda. Does eat french fries and likes to bread foods. Discussed fat/sodium in these products. Pt would likely benefit with from further education with an outpatient RD. Per GI, remains unclear if pt truly having loose stools.     GI:  Last BM: 06/09/24  PRN bowel med(s)    Weight:  Most Recent Weight: 64.2 kg (141 lb 9.6 oz)  on 6/10/24 via Standing scale  Body mass index is 22.18 kg/m .  Wt stable from admission.     Meds:  Lipitor  Iron  Lasix  Thera-vit-M  Protonix  Kcl  warfarin    Labs:  Na 133  CO2 17  Cre 1.44  GFR 55  Alk phos 237      Direct [email or web address]  Hgb 9.3  Hematocrit 31.3    Skin:  reviewed    MALNUTRITION  % Intake: < 75% for > 7 days (moderate)  % Weight Loss: > 10% in 6 months (severe malnutrition)  Subcutaneous Fat Loss: Facial region:  moderate   Muscle Loss: Thoracic region (clavicle, acromium bone, deltoid, trapezius, pectoral):  moderate  Fluid Accumulation/Edema: None noted  Malnutrition Diagnosis: Severe malnutrition in the context of acute illness/disease    Previous Goals   Patient to consume % of nutritionally adequate meal trays TID, or the equivalent  with supplements/snacks.   Evaluation: Met    Previous Nutrition Diagnosis  Inadequate oral intake related to poor appetite/diarrhea as evidenced by patient report    Evaluation: Improving    CURRENT NUTRITION DIAGNOSIS  Food- and nutrition-related knowledge deficit related to lactose free diet as evidenced by patient denies receiving education for this topic previously.      INTERVENTIONS  Implementation  Modify composition of meals/snacks  Nutrition education for recommended modifications     Goals  Patient to consume % of nutritionally adequate meal trays TID, or the equivalent with supplements/snacks.    Monitoring/Evaluation  Progress toward goals will be monitored and evaluated per protocol.    Nicole Jones MS, RD, LD, CNSC    6C (beds 0406-1066) + 7C (beds 4707-1145) + ED   Available in [facility] by name or unit dietitian

## 2024-06-10 NOTE — PROGRESS NOTES
[facility]   Cardiology II Service / Advanced Heart Failure  Daily Progress Note      Patient: Dandy Sands  MRN: [id number]  Admission Date: 6/7/2024  Hospital Day # 3    Assessment and Plan: Dandy Sands is a 63 year old male admitted on 6/7/2024 as a transfer from [facility] where he was most recently admitted 05/31/2024 for weakness, malaise, and persistent diarrhea in setting of chronic driveline infection now transferred to [facility] for ongoing [facility] cares. He has a past medical history significant for ICM s/p HM3 placement 07/08/2022 at [facility], CAD s/p PCI to LAD 2005, in-stent restenosis with TISHA to mLAD and prox RCA 05/24/2022, HLD, c. Diff 09/2022, SLE, antiphospholipid syndrome, chronic anemia, anxiety, and other conditions.     Today's Plan:  - Trending LFTS daily  - HOLDING atorvastatin, protonix, iron pills, trending LFTs. 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 At that time they were opting for ongoing treatment with dalbavancin, micafungin, and bactrim, although they were less certain that candida was active contributor vs colonizer. Per additional recent ID note 05/27/2024, Deep wound culture from abdomen with Serratia marcescens resistant to ciprofloxacin, sensitive to TMP/SMX, cefepime and tobramycin, pip-tazo and gentamicin and Candida parapsilosis as well as  history of VRE from abdominal drainage. Per review of previous 2023 discharge summary also had 1+ corynebacterium striatum from OR wound cultures at that time.  Per OSH radiology read 06/03/2024 of CT abdomen/pelvis w/o contrast, no acute abnormalities but with mild ascites.  BC 05/31/2024: negative at [facility].  - Re-engaging ID for plan for yeast treatment given ongoing LFT dysfunction, would prefer to not use fluconazole at this time  - Please see H&P bottom/data section for copied positive [facility] culture results dating back through 2022  P:  - Now willing to continue Dalbavancin as this does not correlate with his diarrhea (initially was declining this as he attributed his diarrhea to this)  - Continue micafungin 100 mg IV Q24H through 06/12 with consideration of fluconazole for suppression after- awaiting ID recs for this plan given ongoing liver dysfunction. Addendum- they would like IV micafungin until [place] follow-up- will need to assess his ability to do IV medication at home  - Continue dalbavancin (last dose 05/31), gets every two weeks so due 6/14 (if he remains in the hospital will need to coordinate with pharmacy to see if we can get an exception to give this in the hospital)  - Continue Bactrim 800-160 BID through 06/11 then consider suppressive dosing (LFTs were rising before starting bactrim)- f/up id recs    Somewhat unclear exactly what regimen he has been on over last hospital stay and other recent stay, although not a comprehensive list brief review reveals the following:   Vancomycin (05/23-05/24)  Micafungin (05/24-05/27, 06/02-**)  Fluconazole (05/27-06/01)  Bactrim (05/31-**)  Cefepime (5/23 - 5/27)  Dalbavancin (06/2023-present)    Ciprofloxacain (leading up to 05/23 admission was on 250 mg PO PTA for suppression, unclear duration)      #Ischemic Cardiomyopathy s/p HM3 LVAD 07/08/2022 at [facility]  #S/p CRT-D (2006) s/p RV lead upgrade 1/13/23   s/p LVAD 7/8/12 (Likely medtronic device remains from  03/2006 placement although not certain)   #CAD s/p PCI to LAD 2005, insent restenosis and TISHA mLAD, Prox RCA 05/2022  History of ischemic cardiomyopathy s/p chronic driveline infections as above.     6/10/24 RHC Ra 7, PA 19/9/12, PCW 1, AZUL CO/CI 2.96/1.7, PVR 4.06, SVR 1600    Speed: Continue PTA LVAD speed at 5100, discussed decreasing d/t elevated RA and very low pcw, but deferring in favor of digoxin for now, may need to consider in the future pending clinic al course  RV support: Adding digoxin 125 mcg daliy, check a level in 5-7 days  ACEi/ARB/ARNi: None recently, historically was on lisinopril and hydralazine but these were stopped due to dizziness and low maps  BB: historical documentation reflects post-operative RV failure, hypotension, and low flows resulting in none prescribed  SGLT2i: none currently, previous documentation reflects concern for immunosuppression and concern for infections/unclear evidence in LVAD patients  MRA: none, reportedly due to low maps  Anticoagulation: Continue coumadin with inr goal of 2.0-3.0, 1.75 today, continue heparin gtt for bridge  ASA: Not indicated  Volume status: PCW is 1, HOLD PTA 20 mg lasix PO daily  Other: Discussed with patient and daughter that he will need to come back to [place] for VD folllow-up at least twice per year per policy. Will try to arrange with Dr. Stewart in [place] during outreach clinics for inbetween visits, Dr. Lora will remain primary cardiologist     #Abnormal LFTs  AST/ALT have been upper 100s to 300s since at least 06/01 at [facility], results here on admission /. Previously thought related to congestive hepatopathy. Bilirubin WNL, alk phos 237. RUQ ultrasound with mildly increased echogenicity of the hepatic parenchyma with possible hepatic steatosis. Also considering that this could be drug induced.  - Appreicate GI consult (luminal signed off, hepatology to follow)   - Trend LFTs daily  - I have asked GI to weight in on the  plan for fluconazole suppression given ongoing LFT dysfunction  - HOLDING lipitor, protonix, and po iron.   - Continuing bactrim for now as that was started after the liver dysfunction was already happening  - F/up smooth muscle antibody pending and peth (ordered by GI)    #Diarrhea  Patient reports significant history of frequent diarrhea since being on antibiotics for his LVAD, which per his report he has been on nearly since time of placement. His most significant concern and what he finds most puzzling is that it typically goes away in the hospital and returns once home. He believes it may be due to dalbavancin although he did get a dose 05/31 and has not had diarrhea in some time so unclear. This is incredibly life limiting for him as he states he cannot even make it 1 hour without need for BM some days. Upon further review, he is eating a considerable amount of lactose (cheese quite regularly, childresns size ice cream cones, possibly sauces with cheese) and a lot of greesy food as well. HE has not had any diarrhea since 5/31.  - Appreciate GI consult, luminal signed off  - Enteric panel and C. Diff negative 05/23/2024 at [facility]. Now positive for norovirus, but having formed stools, so not correlating clinically, continue enteric precautions  - Pending fecal calprotectin- per GI, expect this to be elevated given positive norovirus  - Pending H. Pylori stool antigen  - STRICT No lactose diet  - RD consult for further education  - I have provided him with a card to bring to restaurants and grocery stores to remind him and the restaurant what foods he can't have  - Should send home with stool diary template     #C. Diff  Has had 2 admission for C. Diff 09/2022, negative 05/23/2024. No current diarrhea  - CTM    #Hemorrhoids  Patient interested in having these surgically fixed if possible as he states the surgeons at [facility] will not do an operation to fix them because he has a LVAD  - Consider surgical consult  pending clinical course      #HLD  - Continue PTA atorvastatin 40 mg PO daily    #SLE, antiphospholipid syndrome  - Continue PTA hydroxychloroquine   - heparin gtt d/t subtheraputic  inr     #Chronic anemia  Per note review runs 8.0-9.0 chronically  - F/up LDH and haptoglobin along with admission CBC  - s/p Infed on 6/10, no further doses required  - Would set up with pcp or [facility] clinic in [place] on discharge as he would benefit from intermittent IV iron rather than PO iron  - STOPPED PO iron d/t elevated LFTS     #Code Status  Discussed this with patient during admission. He quite explicitly does not want chest compressions or to be resuscitated in a cardiac arrest situation, although would be ok with pre-arrest intubation. We discussed at some length how this code status creates a unique situation for him given presence of LVAD and how this would preclude chest compressions and that his ICD would likely shock him out of arrhythmias should they arise. He is accepting of this at the present time and understands that he may get a shock from his ICD. Therefore the most reasonable code status that closely matches his wishes seems to be DNR with an ok for pre-arrest intubation only.    - Appreciate palliative care visit  - DNR  - Ok for pre-arrest intubation  - Understands and accepts he may get shocks from his ICD  - Health psychology consult ordered    # JOSE. Likely d/t PCW of 1.   - Cr 1.44 today, was 1.14 on admission  - Holding lasix  - Digoxin as above  - Daily BMP    Diet: Fluid restriction 2000 ML FLUID  2 Gram Sodium Diet    lactose free diet  DVT Prophylaxis: Warfarin  Fitzgerald Catheter: Not present  Cardiac Monitoring: None  Code Status:  DNR, ok for pre-arrest intubation    Laila España PA-C  Advanced Heart Failure/Cardiology II Service  Vocera preferred, or pager [phone]      Patient discussed with Dr. Stewart      70 minutes spent on the date of the encounter doing chart review, history and exam,  "documentation and further activities per the note    ================================================================    Subjective/24-Hr Events:   Last 24 hr care team notes reviewed. He is feeling very well. No diarrhea since 5/31. One BM yesterday, none today. HE is hungry since he was NPO for the RHC.. No abdominal pain. No nausea. His breathings feels fine. Maybe some abdominal edema. No Le edema. Mild lightheadedness right when he stands up, but otherwise no dizziness, pre-syncpope or syncope.    ROS:  4 point ROS including respiratory, CV, GI and  (other than that noted in the HPI) is negative.     Medications: Reviewed in EPIC.     Physical Exam:   BP 90/76 (BP Location: Right arm)   Pulse 88   Temp 97.8  F (36.6  C) (Oral)   Resp 16   Ht 1.702 m (5' 7\")   Wt 64.2 kg (141 lb 9.6 oz)   SpO2 99%   BMI 22.18 kg/m      GENERAL: Appears comfortable, in no distress .  HEENT: Eye symmetrical, no discharge or icterus bilaterally.   NECK: Supple, JVD <6.   CV: home of lvad, no adventitious lung sounds  RESPIRATORY: Respirations regular, even, and unlabored. Lungs CTA throughout.    GI: Soft and non distended   EXTREMITIES: No peripheral edema.   NEUROLOGIC: Alert and interacting appropriatly.  MUSCULOSKELETAL: No joint swelling or tenderness.   SKIN: No jaundice.     Labs:  CMP  Recent Labs   Lab 06/10/24  0325 06/09/24  0449 06/08/24  0435 06/07/24  0840 06/07/24  0237   * 133* 132* 134* 132*   POTASSIUM 4.0 3.8 4.1 4.4 4.2   CHLORIDE 103 102 103 103 102   CO2 17* 16* 17* 18* 17*   ANIONGAP 13 15 12 13 13   GLC 97 100* 104* 89 96   BUN 20.6 23.5* 27.5* 28.5* 31.9*   CR 1.44* 1.48* 1.25* 1.18* 1.14   GFRESTIMATED 55* 53* 65 70 73   ELDA 9.3 9.3 9.3 9.4 9.4   MAG 1.8 1.8 1.9  --  2.1   PHOS 3.4 3.9 3.6  --  3.7   PROTTOTAL 7.2 7.2 7.1  --  7.3   ALBUMIN 4.2 4.2 4.2  --  4.3   BILITOTAL 0.7 0.8 0.8  --  0.9   ALKPHOS 237* 241* 236*  --  237*   * 320* 288*  --  243*   * 419* 364*  --  317* "       CBC  Recent Labs   Lab 06/10/24  1100 06/10/24  0325 06/09/24  0449 06/08/24  1049 06/08/24  0435   WBC  --  4.1 4.8 4.6 5.0   RBC  --  4.42 4.44 4.39* 4.37*   HGB 9.5* 9.3* 9.3* 9.2* 9.1*   HCT  --  31.3* 31.3* 31.0* 31.0*   MCV  --  71* 71* 71* 71*   MCH  --  21.0* 20.9* 21.0* 20.8*   MCHC  --  29.7* 29.7* 29.7* 29.4*   RDW  --  22.7* 22.7* 22.8* 22.6*   PLT  --  205 205 189 186       INR  Recent Labs   Lab 06/10/24  0325 06/09/24  0449 06/08/24  0435 06/07/24  0502   INR 1.81* 1.79* 1.73* 2.03*

## 2024-06-10 NOTE — PLAN OF CARE
Shift 1900 - 0730    62 yo male transferred from OSH for weakness, malaise, diarrhea. HF secondary to ICM with HM3 LVAD (placed on 7/8/2022). 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     General: AOx4.  Able to verbalize needs.  Uses call light appropriately.  Isolation: Contact/enteric precautions for Norovirus & VRE maintained.    Cardiac: Denies chest pain, dizziness. Converted to SR at beginning of shift then back to Afib by MN. LVAD #s WDL.  No alarms noted.   Respiratory: Denies SOB.  Unlabored.  CTAB.  SpO2 >95% on RA.  GI: Denies N/V. No lactose/ 2 g NA diet.  2 L fluid restriction.  NPO after MN for heart cath in am.   : Voids spontaneously without difficulty.  Uses urinal so nursing can record I&O. Urine output adequate.    Skin: Driving line dressing CDI with abd securement device intact.   Lines: L midline cath SL.  R PIV with heparin gtt infusing (see MAR).     Shift Events:  NPO after MN for heart cath today.

## 2024-06-10 NOTE — PLAN OF CARE
Goal Outcome Evaluation:      Plan of Care Reviewed With: patient    Overall Patient Progress: no changeOverall Patient Progress: no change    Outcome Evaluation: Patient admitted with 6-7 months of ongoing diarrhea, which he reports had been greatly affecting his quality of life. No home care or in-home supports in place prior to admit.

## 2024-06-10 NOTE — PROGRESS NOTES
"    GASTROENTEROLOGY PROGRESS NOTE  GI Luminal Service Sign-Off    Date of Admission: 6/7/2024  Reason for Admission: LVAD, Chronic Driveline Infection, Chronic Sternal Wound, Weakness, Malaise       ASSESSMENT:  Dandy Sands is a 63 year old male with a history of hypertension, hyperlipidemia, systemic lupus erythematosus, antiphospholipid syndrome on chronic warfarin, pulmonary embolism (on warfarin), CAD status-post PCI to LAD in 2005, in-stent re-stenosis with TISHA to mLAD and prox RCA 5/24/2022, ICM (EF 10-15%) status-post HM3 LVAD 7/28/2022, history of C. 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      The GI Luminal Service was consulted regarding: \"Chronic diarrhea, basic infectious workup at OSH was negative (C diff, stool PCR), help with evaluation of non-infectious causes, also having elevated LFTs without a clear origin (not volume overloaded, RUQ E/S not that remarkable).\"        # Acute Norovirus Infection   # Lactose Intolerance  # History of Chronic Loose Stools, unclear whether patient continues to have loose stools  # History of Internal Hemorrhoids  # Concern for Memory Deficits with Concern for Dementia (SLUMS score 13 with OT on 6/9/2024)  Though patient reports a 1 year history of loose stools, per chart review, patient has reports of chronic loose stools dating back to at least 2021 with previous GI endoscopies (EGD and colonoscopy in 2021) for the indication of loose stools without histologic abnormalities in the upper or lower GI tract to explain chronic loose stools. Celiac Serologies 6/15/2023 were negative.  Most recent EGD " "6/16/2023 at [facility] reports 2 non-bleeding superficial duodenal ulcers with no stigmata of bleeding found in the second portion of the duodenum with largest lesion 7 mm in largest dimension and diffuse mild mucosal changes characterized by flattening were found in the second portion of the duodenum but no specimens were collected during this exam.   -- 5/22/2024: Negative Influenza A/B, RSV, SARS-CoV-2, Parainfluenza 1/2/3. TSH within normal limits.   -- 5/23/2024: Stool testing at outside hospital includes C Difficile Toxin B Gene NAD not detected, negative cryptosporidium, no WBCs on gram stain. Negative infectious stool panel.   -- 6/3/2024: CT Abdomen/Pelvis without Contrast 6/3/2024 at [facility] reports normal caliber small and large bowel, no obstruction, no pericolonic inflammatory changes, appendix not identified, no evidence of acute appendicitis.  -- 6/7/2024: POSITIVE Norovirus infection on Enteric Bacteria and Virus Panel PCR otherwise negative. NEGATIVE Microsporidia stain.   -- 6/10/2024: H. Pylori stool antigen is negative.     Stool Sodium and Stool Potassium reported as: \"Test not performed. Specimen submitted is too viscous for testing.\" \"Too viscous\" stool is not consistent with loose stools and likely a formed stool was submitted, which argues against loose stools.     Remains unclear whether this patient is truly having loose stools, as patient's provided history fluctuates between stating 5-7 loose stools then every other day then now stating no bowel movement in 2 days, concerning for memory deficits. Also unclear whether this could be constipation with overflow loose stools. Would hold off on any antidiarrheals at this time to avoid inducing constipation until objective stool output data is obtained. Diarrhea by definition is greater than 3 liquid stools in less than 24 hours. At this time, it does not appear that this patient's rectal output meets the criteria for diarrhea. " Recommend obtaining objective evidence of frequency, consistency and amount of stool through strict documentation of rectal output by hospital staff to determine whether this patient is truly having loose stools. Recommend patient be given a notebook to keep a stool diary to strictly document bowel movements including the time, consistency and amount. However, patient would benefit from a cognitive assessment at this time with concern the patient may not accurately remember rectal output.      Patient reported lactose intolerance with loose stools after dairy consumption (which patient reports consuming dairy prior to admission), so would recommend strict lactose free diet now and going forward.      If the patient is truly having chronic loose stools, etiology of chronic loose stools remains unclear in this patient with loose stools reported since at least 2021. Nonetheless, chronic loose stools differential includes medication adverse effect [Home medications with Diarrhea as Medication Adverse Effect per UpToDate: Micafungin: Diarrhea (7% to 11%); Atorvastatin: Diarrhea (7% to 14%); Gabapentin: diarrhea (IR, adults: 6%); Tamsulosin: Diarrhea (6%); Lisinopril: diarrhea  (1-10%); Omeprazole: diarrhea (4%); Dalbavancin: diarrhea (4%); Bactrim: antibiotic lead to gut dysbiosis and subsequent loose stools], potentially an osmotic etiology (lactose intolerance, laxatives, antacids, artificial/alcohol sugars in food products and drinks, fructose), potentially small intestinal bacterial overgrowth, IBS-D, potentially microscopic colitis (though previous colonic biopsies have been negative, no nocturnal bowel movements), potentially constipation with overflow loose stools, potentially pelvic floor dysfunction contributing with history of fecal incontinence. With previously visualized duodenal ulcerations on EGD 6/16/2023 and unable to locate previous Helicobacter pylori testing, so H. Pylori stool antigen was ordered and  is negative, however, noting that patient takes a chronic PPI which can lead to a false negative stool antigen test. Lower suspicion for false negative H. Pylori in the absence of other GI symptoms. Less likely decreased bile acid absorption with no history of bowel resection. Will assess for colonic inflammation with fecal calprotectin, though not unexpected for fecal calprotectin to be elevated due to acute Norovirus infection.       No overt GI bleeding, so no indication for acute/emergent GI endoscopic intervention at this time. Patient endorses ongoing problem with hemorrhoids, so patient could consider following up with an outpatient Colorectal surgeon to discuss treatment options for hemorrhoids.      Patient currently takes Citrucel 3 capsules in the morning and 2 capsules in the evening prior to admission. Appreciate RD nutrition evaluation and discussion regarding adequate oral fiber intake, fiber supplementation options, and overall nutritional optimization.     6/10/2024 Interval Update: Patient is POSITIVE for Norovirus. Occupational therapy evaluated patient 6/9/2024 with SLUMS score of 13 which places patient in the dementia category. Patient reports soft formed stools since admission. Patient should continue to follow lactose free diet. Additionally could consider starting alternative fiber supplement such as metamucil. Given concern for dementia, could consider patient proceed with keeping a stool diary for objective data for the patient to know when/if the patient experiences loose stools versus diarrhea (>3 loose/watery stools in <24 hours).         # Microcytic Anemia  - MCV 71 fL.  - Hemoglobin 9.3 g/dL.   - LDH barely elevated to 251 U/L.   - Serum Iron is Low, Iron Saturation Index is low, Iron binding capacity within normal limits, ferritin is within normal limits.   - Reticulocytes are within normal limits despite anemia.   - Peripheral smear reported with moderate normochromic, microcytic  anemia; no increase in erythrocyte regeneration; numerous elliptocytes; occasional to numerous echinocytes. No morphologic evidence of hemolysis. Patient's recent serum iron studies suggest anemia of chronic disease (ACD)/inflammation, an interpretation compatible with the hypochromic microcytic RBC indices. That said, numerous elliptocytes are seen on this blood smear - this morphologic finding is associated with iron deficiency anemia, and raises the possibility that iron deficiency may be contributing to the patient's anemia.     Patient's home medication list includes oral iron; patient reports taking oral iron PTA. Given concern for ongoing iron deficiency despite oral iron prior to admission, agree with IV Iron supplementation to replenish the patient's iron stores given ongoing microcytic anemia. Defer additional hematologic anemia work-up per primary team versus could consider hematology consult.        # Elevated Liver Transaminases  Per Care Everywhere, alkaline phosphatase was elevated 3/1/2024 but AST and ALT were within normal limits. ALT elevation noted 5/10/2024 with liver enzymes remaining elevated.      -- 6/3/2024 CT Abdomen/Pelvis without Contrast reports normal liver, cholelithiasis, normal caliber biliary tree, normal pancreas, spleen with calcified granuloma, borderline splenomegaly similar to prior exam.   -- 6/7/2024 RUQ Ultrasound reports mildly increased echogenicity of the hepatic parenchyma relative to the right renal cortex may be seen in hepatic steatosis, questionable gallstones in the neck of the gallbladder with biliary sludge without evidence of acute cholecystitis; no gallbladder wall thickening, no pericholecystic fluid.     -- 6/7/2024 Echocardiogram with mild left ventricular dilation, decreased left ventricular function with ejection fraction <30% (severely reduced), mild right ventricular dilation with global right ventricular function mild to moderately reduced, AV closed,  mild aortic insufficiency present, and reports LV outflow graft cannula flow and velocity is normal.     -- Total bilirubin, Platelets and Albumin are within normal limits.   -- INR elevated: 1.81 in this patient on Warfarin.   -- Alkaline phosphatase elevated to 237 U/L.   -- ALT elevated to 381 U/L.   -- AST elevate to 245 U/L.  -- Hepatitis B Surface Antigen: Nonreactive.   -- Hepatitis A IgM Antibody: Nonreactive.  -- Hepatitis B Core IgM: Nonreactive.  -- Hepatitis C Antibody: nonreactive.  -- F-Actin (Smooth Muscle) Ab, IgG by ROGERS: Negative.   -- PEth to confirm abstinence from alcohol remains pending.     Elevated liver transaminases is nonspecific with differential including congestive hepatopathy (recommend RUQ ultrasound with Dopplers to further assess - as of 6/10, still no RUQ US with dopplers/flow completed), medication adverse effect [home medications with adverse effects per UpToDate: Micafungin: Increased serum alkaline phosphatase (3% to 6%); Atorvastatin: Increased serum transaminases (?2%); Dalbavancin: Hepatotoxicity (<2%), increased serum alkaline phosphatase (<2%), increased serum transaminases (<2%)] versus viral (positive Norovirus).        RECOMMENDATIONS:  -- RUQ Ultrasound with Dopplers/Flow to assess for congestive hepatopathy, evaluate for thrombosis in the setting of elevated liver transaminases.   -- Continue to trend daily Hepatic Function Panel.   -- PEth pending to confirm abstinence from alcohol.     -- Could consider further inpatient cognitive assessments given concern for Dementia on SLUMS score with OT 6/9/2024.   - Otherwise, could consider outpatient referral for further cognitive assessments to establish diagnosis.     -- Appreciate Health Psychology involvement for mental health optimization.     -- Appreciate Palliative Team involvement for ongoing goals of care conversations.     -- Appreciate ID involvement regarding chronic driveline infection and antimicrobial  selections.      -- Patient would benefit from being provided a notebook to keep a stool diary to strictly document bowel movements including the time, consistency and amount of stool given memory concerns, concern for dementia.   -- Strict documentation of rectal output by hospital staff while patient remains inpatient.  - Appreciate detailed documentation of stool appearance, including color, consistency, frequency and amount.               - Consider smearing stool thinly on white paper towel to distinguish color.      -- Avoid antidiarrheals at this time given no loose stools and to avoid inducing constipation.      -- Defer any additional hematologic anemia work-up to primary team.  - Given concern for ongoing iron deficiency despite oral iron prior to admission, appreciate IV Iron supplementation to replenish the patient's iron stores given ongoing microcytic anemia.     -- Appreciate RD involvement for nutritional evaluation, nutritional optimization, discuss adequate oral fiber intake, fiber supplementation options.   - Please avoid all artificial sugars (Aspartame, Sucralose, Acesulfame K, Saccharin, Xylitol), as these can lead to loose stools.   - Strict lactose free diet.      -- Please consider following an antireflux regimen. This includes:  - Do not lie down for at least 3 to 4 hours after meals.  - Raise the head of the bed 6 to 8 inches (35-45 degrees).  - Decrease excess weight.  - Avoid foods and liquids that cause symptoms.  - Avoid cigarettes and other tobacco products.   -- Noted PPI was discontinued due to concerns for adverse effect. If patient's acid reflux or heartburn recurs, could consider starting Famotidine 20 mg PO BID.      -- Continue Supportive Cares  - Adequate volume resuscitation with IVF, pRBCs.  - Monitor Hemoglobin closely. Transfuse to keep Hgb > 7 g/dL from GI standpoint.   - Monitor Platelets closely. Keep PLT > 50 10e3/uL from GI standpoint.  - Maintain hemodynamics: MAP  >65 mmHg and HR <100.  - Monitor and optimize electrolytes.  - Monitor and optimize nutrition. --> Diet per primary team. Appreciate RD nutrition recs.   - Reposition every 30 minutes while awake.  - Encourage Ambulation as able: 4-6 walks per day.   -- No indication for acute/emergent GI endoscopic intervention at this time.   -- Avoid NSAIDs.  -- Analgesics/Antiemetics per primary team.  -- If the patient experiences overt GI bleeding with hemodynamic instability, please page the GI Luminal Service (listed on [facility]).       OUTPATIENT:   -- Patient endorses ongoing problem with hemorrhoids, so patient could consider following up with an outpatient Colorectal surgery to discuss treatment options for hemorrhoids.  -- If patient desires, patient can continue to follow-up with their local gastroenterologist in [place], [place] if ongoing GI concerns.      COVID status: negative 5/23/2024.     Discussed with Valerie España PA-C - Cardiology 2 team.     The inpatient Gastroenterology Luminal Service will sign off at this time.     The GI Hepatology Service will be taking over this patient's care tomorrow 6/11/2024 to continue addressing elevated transaminases.     Thank you for allowing us to participate in the care of this patient. Please do not hesitate to page the GI service with any questions or concerns.     The patient was discussed and plan agreed upon with Dr. Antonietta Shaw, GI Luminal Service staff physician. Briefly discussed case with Dr. Tom Leventhal, GI Hepatology Staff physician.     Overall time spent on the date of this encounter preparing to see the patient (including chart review of available notes, clinical status events, imaging and labs); discussing, ordering, coordinating recommended medications, tests or procedures; communicating with other health care professionals; and documenting the above clinical information in the electronic medical record was 60 minutes.    Yoli Oscar,  "PA-C  Inpatient Gastroenterology Service  [facility]  Text Page  _______________________________________________________________      Subjective: Nursing notes and 24hr events reviewed.     Patient seen and examined at 1030. Patient reports no diarrhea since admission.     Reports soft formed brown stools.     Denies nausea, vomiting, acid reflux or heartburn.     Denies abdominal pain.    Discussed assessment and plan as above. Patient expressed understanding and had no additional questions/concerns.       ROS:   4 pt ROS negative unless noted in subjective.     Objective:  Blood pressure 90/76, pulse 84, temperature 97.8  F (36.6  C), temperature source Oral, resp. rate 16, height 1.702 m (5' 7\"), weight 64.2 kg (141 lb 9.6 oz), SpO2 99%.      General: 63 year old male lying in bed in NAD. Appears comfortable.  Answers appropriately.    HEENT: Head is AT/NC. Sclera anicteric.   Lungs: No increased work of breathing, speaking in full sentences, equal chest rise. On Room Air.   Heart: Regular rate. Peripheral perfusion intact. +LVAD present.   Abdomen: Soft, non-tender, non-distended.  No peritoneal signs.    Extremities: WWP.  Musculoskeletal: No gross deformity.  Skin: No jaundice or rash on exposed skin.  Neurologic: Grossly non-focal.  CN 2-12 grossly intact.   Mental status/Psych: A&O. Asks/answers questions appropriately. Pleasant, interactive.       Previous Procedures:     6/16/2023 - EGD - [facility]  Indications:           Melena   Providers:             BILLY BAIG MD   Findings:       The Z-line was regular and was found 40 cm from the incisors.        The gastroesophageal flap valve was visualized endoscopically and        classified as Hill Grade III (minimal fold, loose to endoscope, hiatal        hernia likely).        The esophagus was normal.        A 2 cm hiatal hernia was present.        The entire examined stomach was normal.        There is no " endoscopic evidence of bleeding, erythema, erythema or        inflammatory changes suggestive of gastritis, inflammation, mucosal        abnormalities, ulceration, angioectasia or Dieulafoy lesions in the        entire examined stomach.        The cardia and gastric fundus were normal on retroflexion.        Two non-bleeding superficial duodenal ulcers with no stigmata of        bleeding were found in the second portion of the duodenum. The largest        lesion was 7 mm in largest dimension.        Diffuse mild mucosal changes characterized by flattening were found in        the second portion of the duodenum.                                                                                    Impression:       - Z-line regular, 40 cm from the incisors.        - Gastroesophageal flap valve classified as Hill Grade III (minimal        fold, loose to endoscope, hiatal hernia likely).        - Normal esophagus.        - 2 cm hiatal hernia.        - Normal stomach.        - Non-bleeding duodenal ulcers with no stigmata of bleeding.        - No specimens collected.      _________________________________________________________________     5/23/2022 - Colonoscopy - Dr. Kaveh Meneses   Findings:       The colon and TI appeared normal.        Internal hemorrhoids were found during retroflexion.                                                                                    Impression:            Colonoscopy performed for screening in a potential                          heart transplant candidate. He reports no FH of colon                          cancer.                          No polyps were seen.      _________________________________________________________________     3/26/2021 - EGD - [facility] [place] - Dr. Bryan Feyen   Indication: Dyspepsia, Heartburn         3/26/2021 - Colonoscopy - [facility] - Dr. Aleks Mooney  Indications: Abdominal pain in the left lower quadrant, chronic  diarrhea.                              LABS:  BMP  Recent Labs   Lab 06/10/24  0325 06/09/24  0449 06/08/24  0435 06/07/24  0840   * 133* 132* 134*   POTASSIUM 4.0 3.8 4.1 4.4   CHLORIDE 103 102 103 103   ELDA 9.3 9.3 9.3 9.4   CO2 17* 16* 17* 18*   BUN 20.6 23.5* 27.5* 28.5*   CR 1.44* 1.48* 1.25* 1.18*   GLC 97 100* 104* 89     CBC  Recent Labs   Lab 06/10/24  0325 06/09/24  0449 06/08/24  1049 06/08/24  0435   WBC 4.1 4.8 4.6 5.0   RBC 4.42 4.44 4.39* 4.37*   HGB 9.3* 9.3* 9.2* 9.1*   HCT 31.3* 31.3* 31.0* 31.0*   MCV 71* 71* 71* 71*   MCH 21.0* 20.9* 21.0* 20.8*   MCHC 29.7* 29.7* 29.7* 29.4*   RDW 22.7* 22.7* 22.8* 22.6*    205 189 186     INR  Recent Labs   Lab 06/10/24  0325 06/09/24  0449 06/08/24  0435 06/07/24  0502   INR 1.81* 1.79* 1.73* 2.03*     LFTs  Recent Labs   Lab 06/10/24  0325 06/09/24  0449 06/08/24  0435 06/07/24  0237   ALKPHOS 237* 241* 236* 237*   * 320* 288* 243*   * 419* 364* 317*   BILITOTAL 0.7 0.8 0.8 0.9   PROTTOTAL 7.2 7.2 7.1 7.3   ALBUMIN 4.2 4.2 4.2 4.3      PANCNo lab results found in last 7 days.      IMAGING:     Right upper quadrant ultrasound 6/7/2024 9:44 AM   HISTORY: Elevated LFTs of unknown origin.        TECHNIQUE: The abdomen was scanned in standard fashion with  specialized ultrasound transducer(s) using both gray-scale, color  Doppler, and spectral flow techniques.     COMPARISON: 5/20/2022 right upper quadrant ultrasound     FINDINGS:      Fluid: No evidence of ascites or pleural effusions.     Liver: The liver mildly increased echogenicity relative to the renal  cortical parenchyma, measuring 16.9 cm in craniocaudal dimension.  There is no focal mass.      Gallbladder: There is no wall thickening, pericholecystic fluid, or  positive sonographic Prater's sign. Questionable small gallstones near  the neck of the gallbladder. Biliary sludge is present.        Bile Ducts: No significant intrahepatic biliary dilatation.  The  common bile duct is  not visualized this study.      Pancreas: Pancreas was unable to be visualized in this study.     Kidney: The right kidney measures 10.7 cm long. There is no  hydronephrosis or hydroureter, no shadowing renal calculi, cystic  lesion or mass.                                                                    IMPRESSION:  1. Mildly increased echogenicity of the hepatic parenchyma relative to  the right renal cortex may be seen in hepatic steatosis.  2. Questionable gallstones in the neck of the gallbladder with biliary  sludge without evidence for acute cholecystitis.  3. The pancreas and common bile duct could not be visualized in this  study.        Prior to Admission Imaging at [facility]:      CT Abdomen Pelvis w/o Contrast  Order: [id number]  Narrative        Patient Name:   OLEG KAUR   YOB: 1960   Procedure: CT ABDOMEN PELVIS WITHOUT CONTRAST   Date of Service: 06/03/2024      EXAM: CT ABDOMEN PELVIS WITHOUT CONTRAST    MR #: [id number]  INDICATION: LVAD, chronic infection, eval site    ADDITIONAL INDICATION: Inguinal hernia repair    COMPARISON: 5/23/2024    TECHNIQUE:  No IV contrast. Standard coronal and sagittal reformats were reconstructed on a non-independent workstation.    FINDINGS:  LOWER THORAX:  Mild atelectasis/scarring and subpleural reticulation in the lung bases. Cardiomegaly. Left ventricular assistance device.    ABDOMEN/PELVIS:  Evaluation of the solid abdominal organs and bowel is limited due to the lack of IV and oral contrast.    Liver: Normal.    Gallbladder and biliary tree: Cholelithiasis. Normal caliber biliary tree.    Pancreas: Normal.      Adrenal glands: Normal.    Spleen: Calcified granuloma. Borderline splenomegaly similar to the prior exam.    Stomach and duodenum: Unremarkable.    Vasculature: Atherosclerotic calcification in the abdominal aorta and branching arteries. IVC filter.    Genitourinary: No hydronephrosis No ureteral calculus. Normal  bladder. Grossly normal prostate.        Lymph nodes: Mildly prominent distal external iliac chain lymph nodes which are likely reactive.    Bowel: Normal caliber small and large bowel. No obstruction. No pericolonic inflammatory changes. The appendix is not identified. No evidence of acute appendicitis.    Peritoneal Space and Soft Tissues: Ascites in the pelvis decreased compared to 5/23/2024.    Bones: No lytic or blastic osseous lesions. Findings in the femoral heads consistent with avascular necrosis.    IMPRESSION:  1.  Mild pelvic ascites decreased compared to 5/23/2024.  2.  No acute abnormality in the abdomen or pelvis.           CT Abdomen Pelvis w/o Contrast  Order: [id number]  Narrative        Patient Name:   OLEG KAUR   YOB: 1960   Procedure: CT ABDOMEN PELVIS WITHOUT CONTRAST   Date of Service: 05/23/2024      EXAM: CT ABDOMEN PELVIS WITHOUT CONTRAST    INDICATION:LVAD, concern for infection      COMPARISON(S):  CT scan abdomen pelvis October 28, 2023    TECHNIQUE:  Axial images were obtained through the abdomen and pelvis without IV contrast. There are coronal and sagittal reformatted images. This study is limited without the use of IV contrast.    FINDINGS:  Surgical history includes a inguinal hernia repair, the patient has a left ventricular assist device.  Imaging is limited in the upper abdomen related to the left ventricular assist device.    The spleen is upper limits of normal for size. The pancreas is stable.  There is a gallstone in the gallbladder there are no secondary signs of cholecystitis and there is no biliary duct dilatation.  The liver is not enlarged. No definite hepatic masses are noted.    Right and left adrenal gland is stable.  There is no hydronephrosis or hydroureter. No renal or ureteral calculus is noted.  There is no aortic aneurysm. There is an inferior vena caval filter.  There is no enlarging abdominal and pelvic lymph nodes.    There is mild  ascites within the abdomen pelvis this is increased in volume from the previous exam.    The colon is not distended and there is no colitis. The appendix is not identified. There is no small bowel obstruction or enteritis. The stomach is normal for the degree of distention.    The urinary bladder is normal. No pelvic masses are seen.    There are no acute osseous findings. There are findings of left and right femoral head consistent with AVN.    IMPRESSION:    1. There is mild ascites abdomen pelvis increased from prior exam.  2. No small bowel obstruction, no colitis or enteritis.

## 2024-06-10 NOTE — PRE-PROCEDURE
[facility]   Interventional Cardiology        Consenting/Education for Right Heart Catheterization     Patient understands that we would like to perform a right heart catheterization. This procedure will be performed by the interventional cardiologist.    Patient understands during the right heart catheterization a fine tube (catheter) is put into the vein of the groin/neck.  It is carefully passed along until it reaches the heart and then goes up into the blood vessels of the lungs. This is done to measure a variety of pressures in your heart and can tell us how well the heart is filling and emptying, as well as monitor fluid status.     Patient also understands risks and complications of the procedure which include, but are not limited to bruising/swelling around the incision site, infection, bleeding, allergic reaction to local anesthetic.      Patient verbalized understanding of risks and benefits of the right heart catheterization and has elected to proceed with the procedure.       Rut SAMAYOA, JOSE  [facility] Interventional Cardiology

## 2024-06-10 NOTE — PROGRESS NOTES
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:  - Current suppressive regimen: biweekly dalbavancin (started 6/2023), Bactrim (on treatment dose), plan for treatment with micafungin then fluconazole suppression              - Organisms:   - Serratia marcescens (first isolated: 7/12/23, most recent: 5/23/24)  - Candida parapsilosis (first isolated: 5/23/24)                          - E.coli (first isolated: 8/2023, most recent: 12/14/23)  - Corynebacterium striatum (first isolated 12/2022, most recent: 3/18/2023)  - VRE single colony 10/10/23  Diarrhea, chronic since 2021  - long-standing, has had extensive work up including EGD and colonoscopy (done in 2021)  - OSH notes document formed stools in hospital  Weakness, malaise  Hx C.diff  SLE, antiphospholipid syndrome- on Plaquenil    Recommendations:  Continue dalbavancin suppression.  - If Dandy does not want to continue dalbavancin when nearing next infusion, would need to coordinate with primary ID provider at [facility] for their preferred substitution.   Continue Bactrim 1DS BID at treatment dose through 6/11 (~4 weeks) then okay to decrease to suppressive dose of 1 DS daily vs deferring to [facility] ID for transition to suppression.   Continue micafungin daily until [facility] ID follow up (recommend follow up in 1-2 weeks with reevaluation of LFTs). At this time, would hold on transition to fluconazole suppression due to abnormal LFTs.   Consider cognitive evaluation or family engagement if making large care plan changes (such as stopping suppression/LVAD cares), per OSH notes- daughter with concern that he has not been a reliable historian, previous concerns for  "cognitive issues, and neuropsychological evaluation 1/12/2024 that stated \"family assistance with complex decision making is recommended given his difficulties with executive functioning\".  Appreciate GI involvement for chronic diarrhea evaluation.  Appreciate palliative care team involvement.   Recommend follow up with outpatient ID provider at [facility] in 1-2 weeks for ongoing suppression management.     Thank you for the consult. Infectious disease will sign off at this time. Do not hesitate to contact us with additional questions or change in patient's clinical status.      Assessment:  Dandy Sands is a 63 year old male with history of ICM s/p HeartMate3 LVAD (7/8/2022) c/b polymicrobial chronic driveline infection on suppressive dalbavancin, cipro recently changed to Bactrim, with Corynebacterium, E.coli, Serratia, and more recently Candida parapsilosis isolated; chronic diarrhea since 2021, C.diff (9/2022), SLE on Plaquenil, antiphosopholipid syndrome who was admitted to [facility] ([place], [place]) on 5/31 with weakness, malaise, and diarrhea.      Recent hospitalization with concern for worsening driveline infection cx with Serratia (resistant to cipro- previous suppressive regimen) and C.parapsilosis. Dalbavancin was continued and he was started on treatment-dose Bactrim as well as micafungin with plan for transition to suppression-dose Bactrim and fluconazole after the treatment course. No signs of acute infection at present, continue with the outside ID provider's previously outlined plan.      Diarrhea likely multifactorial. Possibility that recent worsening LVAD infection (5/22 admission), possible viral illness may have been contributing to worsening of the chronic diarrhea around 5/19-5/22 leading up to recent admission. No diarrhea while under direct, objective observation while hospitalized between 5/31-6/7. In review of previous notes - Dandy felt that Bactrim was the culprit. Dandy feels " strongly that diarrhea is due to dalbavancin, though diarrhea started over a year before dalbavancin and on first several months of clinic visits was not having significant issues with diarrhea. Was hospitalized with diarrhea in 10/2023 that improved without significant changes to his antimicrobial regimen, phases of worsening diarrhea have been intermittent before and after starting dalbavancin. On most recent outpatient ID visit (5/6/24) diarrhea was well controlled with Citrucel. Less likely that dalbavancin is sole cause of symptoms. Additionally, he has not had any diarrhea since last dalbavancin infusion on 5/31. This regimen is the easiest for patient care needs at home compared to daily infusions, could consider minocycline but had breakthrough on this and would need to be discussed with primary ID team. Discussed this with Dandy on 6/7 as well as risk that infection would come back and get worse. He still strongly believes the dalbavancin is the culprit and states he would rather have the infection come back than have diarrhea.    Reconsulted 6/10 for question regarding possible transition to PO fluconazole for yeast suppression after micafungin treatment. His LFTs are elevated for unclear reasons- so would avoid this until this has been evaluated and demonstrates improvement. Defer transition to [facility] ID team. Continue micafugin for now.    Thank you for this consult. ID will sign off.    Gwendolyn Barrett PA-C  Infectious Diseases  Contact via [facility] or [facility] Paging/Directory    Medical Decision Making     50 MINUTES SPENT BY ME on the date of service doing chart review, history, exam, documentation & further activities per the note.            Interim History and Events:     Dandy is feeling well overall. Denies drainage, erythema or pain around driveline. Discussed antibiotic/antifungal regimen with him. He states he'd be comfortable dosing the micafungin at home himself.     Physical  Examination:  Temp: 97.8  F (36.6  C) Temp src: Oral BP: 90/76 Pulse: 84   Resp: 16 SpO2: 99 % O2 Device: None (Room air)      Vitals:    06/07/24 0000 06/08/24 0600 06/09/24 0446 06/10/24 0310   Weight: 64.5 kg (142 lb 3.2 oz) 62.9 kg (138 lb 9.6 oz) 63.5 kg (139 lb 15.9 oz) 64.2 kg (141 lb 9.6 oz)       Constitutional: Pleasant male seen sitting up in bed, in NAD. Alert and interactive.   HEENT: NCAT, anicteric sclerae, conjunctiva clear. Moist mucous membranes.  Respiratory: Non-labored breathing on RA. No cough.  GI:  Abdomen is soft, non-distended. LVAD driveline covered- did not assess.   Skin: Warm and dry. No rashes or lesions on exposed surfaces.  Musculoskeletal: Extremities grossly normal. No tenderness or edema present.   Neurologic: A &O x3, speech normal, answering questions appropriately. Moves all extremities spontaneously. Grossly non-focal.  Neuropsychiatric: Mentation and affect normal/appropriate.  VAD: Midline      Medications:  Current Facility-Administered Medications   Medication Dose Route Frequency Provider Last Rate Last Admin    aspirin EC tablet 81 mg  81 mg Oral Daily Darvin Santana MD   81 mg at 06/10/24 0752    [Held by provider] atorvastatin (LIPITOR) tablet 40 mg  40 mg Oral Daily Darvin Santana MD   40 mg at 06/07/24 0852    [Held by provider] ferrous sulfate (FEROSUL) tablet 325 mg  325 mg Oral Every Other Day Darvin Snatana MD   325 mg at 06/09/24 0750    furosemide (LASIX) tablet 20 mg  20 mg Oral Daily Darvin Santana MD   20 mg at 06/10/24 0752    gabapentin (NEURONTIN) capsule 100 mg  100 mg Oral TID Darvin Santana MD   100 mg at 06/10/24 1315    hydroxychloroquine (PLAQUENIL) half-tab 300 mg  300 mg Oral Daily Justo Stewart MD   300 mg at 06/10/24 0752    iron dextran complex (INFED) 975 mg in sodium chloride 0.9 % 569.5 mL intermittent infusion  975 mg Intravenous Once Laila España PA-C 142.4 mL/hr at 06/10/24 1239 975 mg at 06/10/24 1239     "micafungin (MYCAMINE) 100 mg in sodium chloride 0.9 % 100 mL intermittent infusion  100 mg Intravenous Q24H Laila España PA-C 100 mL/hr at 06/10/24 1142 100 mg at 06/10/24 1142    multivitamin w/minerals (THERA-VIT-M) tablet 1 tablet  1 tablet Oral Daily Justo Stewart MD   1 tablet at 06/10/24 0752    [Held by provider] pantoprazole (PROTONIX) EC tablet 40 mg  40 mg Oral BID AC Darvin Santana MD   40 mg at 06/09/24 0750    potassium chloride hector ER (KLOR-CON M10) CR tablet 20 mEq  20 mEq Oral Daily Darvin Santana MD   20 mEq at 06/10/24 0753    sodium chloride (PF) 0.9% PF flush 3 mL  3 mL Intracatheter Q8H Darvin Santana MD   3 mL at 06/10/24 0420    sulfamethoxazole-trimethoprim (BACTRIM DS) 800-160 MG per tablet 1 tablet  1 tablet Oral BID Darvin Santana MD   1 tablet at 06/10/24 0752    tamsulosin (FLOMAX) capsule 0.4 mg  0.4 mg Oral Daily Darvin Santana MD   0.4 mg at 06/10/24 0752    warfarin ANTICOAGULANT (COUMADIN) tablet 4 mg  4 mg Oral ONCE at 18:00 Layne Prabhakar MD        Warfarin Dose Required Daily - Pharmacist Managed  1 each Oral See Admin Instructions Darvin Santana MD           Infusions/Drips:  Current Facility-Administered Medications   Medication Dose Route Frequency Provider Last Rate Last Admin    heparin 25,000 units in 0.45% NaCl 250 mL ANTICOAGULANT infusion  0-5,000 Units/hr Intravenous Continuous Laila España PA-C 7 mL/hr at 06/10/24 0948 700 Units/hr at 06/10/24 0948    Patient is already receiving anticoagulation with heparin, enoxaparin (LOVENOX), warfarin (COUMADIN)  or other anticoagulant medication   Does not apply Continuous PRN Darvin Santana MD        Reason ACE/ARB/ARNI order not selected   Other DOES NOT GO TO Darvin Smith MD        Reason beta blocker not prescribed   Does not apply DOES NOT GO TO Darvin Smith MD           Laboratory Data:   No results found for: \"ACD4\"    Inflammatory Markers  "   Recent Labs   Lab Test 06/19/22  1522   SED 39*   CRP 28.0*       Metabolic Studies       Recent Labs   Lab Test 06/10/24  0325 06/09/24  0449 06/08/24  0435 06/07/24  0840 06/07/24  0237 12/21/23  1100 12/14/23  0826 10/29/22  0615 10/28/22  1957 10/28/22  0533 10/27/22  2212 06/19/22  2157 06/19/22  1522 05/20/22  1715 05/20/22  0516   * 133* 132* 134* 132*  --  137   < >  --    < > 134*   < > 138   < > 138   POTASSIUM 4.0 3.8 4.1 4.4 4.2  --  4.3   < >  --    < > 4.2   < > 4.0   < > 4.2   CHLORIDE 103 102 103 103 102 107 105   < >  --    < > 101   < > 107   < > 105   CO2 17* 16* 17* 18* 17*  --  19*   < >  --    < > 20*   < > 26   < > 24   ANIONGAP 13 15 12 13 13  --  13   < >  --    < > 13   < > 5   < > 9   BUN 20.6 23.5* 27.5* 28.5* 31.9*  --  33.2*   < >  --    < > 13.9   < > 10   < > 16   CR 1.44* 1.48* 1.25* 1.18* 1.14  --  1.53*   < >  --    < > 0.72   < > 0.78   < > 0.92   GFRESTIMATED 55* 53* 65 70 73  --  51*   < >  --    < > >90   < > >90   < > >90   GLC 97 100* 104* 89 96  --  138*   < >  --    < > 100*   < > 85   < > 87   A1C  --   --   --   --   --   --   --   --   --   --   --   --   --   --  5.5   ELDA 9.3 9.3 9.3 9.4 9.4  --  9.2   < >  --    < > 8.9   < > 8.2*   < > 8.4*   PHOS 3.4 3.9 3.6  --  3.7  --   --   --   --   --   --    < >  --    < >  --    MAG 1.8 1.8 1.9  --  2.1  --   --    < >  --    < >  --    < >  --    < > 2.1   LACT  --   --   --   --   --   --   --   --  1.6  --  1.3   < > 1.1   < >  --    CKT  --   --   --  34*  --   --   --   --   --   --   --   --  37  --   --     < > = values in this interval not displayed.       Hepatic Studies    Recent Labs   Lab Test 06/10/24  0325 06/09/24  0449 06/08/24  0435 06/07/24  0840 06/07/24  0237 12/14/23  1253 12/14/23  0826 08/24/23  0943   BILITOTAL 0.7 0.8 0.8  --  0.9  --  0.4 0.4   ALKPHOS 237* 241* 236*  --  237*  --  206* 205*   ALBUMIN 4.2 4.2 4.2  --  4.3  --  4.2 4.2   * 320* 288*  --  243*  --  32 22   * 419*  364*  --  317*  --  33 22   LDH  --   --   --   --  251* 177 165 153   GGT  --   --   --  294*  --   --   --   --        Pancreatitis testing    Recent Labs   Lab Test 10/13/22  2050 09/19/22  0904 07/21/22  0331 07/11/22  0337 06/18/22  0334 05/20/22  0516 05/16/22  1442   LIPASE 69*  --   --   --   --   --   --    TRIG  --  126 110 74 53 84 96       Hematology Studies      Recent Labs   Lab Test 06/10/24  1100 06/10/24  0325 06/09/24  0449 06/08/24  1049 06/08/24  0435 06/07/24  1801 06/07/24  0840 07/20/22  1523 07/20/22  0949 07/20/22  0355 07/19/22  2049 07/19/22  1601 07/19/22  0946 07/19/22  0322 07/18/22 2118   WBC  --  4.1 4.8 4.6 5.0 5.3 4.9   < > 15.9* 13.9* 22.4*   < > 18.5* 15.5* 19.0*   ANEU  --   --   --   --   --   --   --   --  14.6* 11.8* 19.5*  --  16.3* 12.2* 17.5*   ALYM  --   --   --   --   --   --   --   --  0.2* 1.0 1.3  --  0.7* 1.4 0.4*   GERALDO  --   --   --   --   --   --   --   --  0.5 0.4 0.4  --  0.7 0.5 0.4   AEOS  --   --   --   --   --   --   --   --  0.5 0.1 0.4  --  0.2 0.3 0.4   HGB 9.5* 9.3* 9.3* 9.2* 9.1* 9.5* 9.3*   < > 8.5* 8.1* 8.2*   < > 8.3* 7.0* 7.6*   HCT  --  31.3* 31.3* 31.0* 31.0* 31.7* 30.6*   < > 27.0* 25.4* 26.0*   < > 25.5* 21.9* 23.4*   PLT  --  205 205 189 186 201 180   < > 150 157 200   < > 164 155 179    < > = values in this interval not displayed.       Arterial Blood Gas Testing    Recent Labs   Lab Test 10/16/22  1009 08/08/22  1607 08/08/22  1333 08/08/22  1014 08/08/22  0359 08/07/22 2025   PH  --  7.42 7.46* 7.47* 7.44 7.46*   PCO2  --  41 37 37 40 39   PO2  --  88 87 118* 201* 162*   HCO3  --  26 26 27 28 28   O2PER 21 2 40 40 50 50        Urine Studies     Recent Labs   Lab Test 10/28/22  0926 09/19/22  0023 09/10/22  1222 08/31/22  1542 08/03/22  1110 07/21/22  2239   URINEPH 6.0 5.5 5.5 5.5 7.0 5.5   NITRITE Negative Negative Negative Negative Negative Negative   LEUKEST Negative Negative Negative Negative Negative Negative   WBCU 1 <1  --  4 21* 1        Vancomycin Levels     Recent Labs   Lab Test 08/24/23  0943 03/26/23  0753 03/23/23  0616 03/20/23  1626 03/20/23  0601 01/12/23  0822   VANCOMYCIN <4.0 17.7 14.2 21.3 18.4 <4.0       Microbiology:  Culture   Date Value Ref Range Status   12/14/2023 No anaerobic organisms isolated  Final   12/14/2023 3+ Escherichia coli (A)  Final   08/24/2023 1+ Escherichia coli (A)  Final   08/24/2023 No anaerobic organisms isolated  Final   08/24/2023 No Growth  Final   03/18/2023 No anaerobic organisms isolated  Final   03/18/2023 No Growth  Final   03/18/2023 1+ Corynebacterium striatum (A)  Final     Comment:     Identification obtained by MALDI-TOF mass spectrometry research use only database. Test characteristics determined and verified by the [facility] Diagnostic Laboratory.Susceptibilities not routinely done, refer to antibiogram to view typica  l susceptibility profiles   03/18/2023 No Growth  Final   03/16/2023 No Growth  Final   01/12/2023 No Growth  Final   01/12/2023 No Growth  Final   01/11/2023 No Growth  Final   01/11/2023 No anaerobic organisms isolated  Final   12/12/2022 No Growth  Final   12/12/2022 No Growth  Final   12/12/2022 4+ Corynebacterium striatum (A)  Final     Comment:     Identification obtained by MALDI-TOF mass spectrometry research use only database. Test characteristics determined and verified by the [facility] Diseases Diagnostic Laboratory.Susceptibilities not routinely done   12/12/2022 4+ Corynebacterium striatum (A)  Final     Comment:     Identification obtained by MALDI-TOF mass spectrometry research use only database. Test characteristics determined and verified by the [facility] Diseases Diagnostic Laboratory.Susceptibilities not routinely done   12/12/2022 No anaerobic organisms isolated  Final   10/28/2022 2+ Corynebacterium striatum (A)  Final     Comment:     Identification obtained by MALDI-TOF mass spectrometry research use only database. Test characteristics  determined and verified by the Infectious Diseases Diagnostic Laboratory.Susceptibilities not routinely done   10/28/2022 10,000-50,000 CFU/mL Proteus mirabilis (A)  Final   10/27/2022 No Growth  Final   10/27/2022 No Growth  Final   10/26/2022 No Growth  Final   10/26/2022 No Growth  Final   10/26/2022 No anaerobic organisms isolated  Final   10/26/2022 1+ Corynebacterium striatum (A)  Final     Comment:     Identification obtained by MALDI-TOF mass spectrometry research use only database. Test characteristics determined and verified by the Infect[facility] Diseases Diagnostic Laboratory.Susceptibilities not routinely done   08/04/2022 No Growth  Final   08/04/2022 No Growth  Final   08/03/2022 No Growth  Final   08/03/2022 No Growth  Final   08/03/2022 No Growth  Final   08/02/2022 No Growth  Final   08/02/2022 Positive on the 1st day of incubation (A)  Final   08/02/2022 Staphylococcus epidermidis (AA)  Final     Comment:     1 of 2 bottles   08/02/2022 Positive on the 1st day of incubation (A)  Final   08/02/2022 Enterococcus faecalis (AA)  Final     Comment:     1 of 2 bottles   08/01/2022 No Growth  Final   07/31/2022 No Growth  Final   07/30/2022 No Growth  Final   07/29/2022 No Growth  Final   07/28/2022 No Growth  Final   07/28/2022 No anaerobic organisms isolated  Final   07/28/2022 No Growth  Final   07/27/2022 No Growth  Final   07/25/2022 No Growth  Final   07/25/2022 No Growth  Final   07/25/2022 No Growth  Final   07/24/2022 No Growth  Final   07/23/2022 No Growth  Final   07/22/2022 No Growth  Final   07/21/2022 No Growth  Final   07/21/2022 No Growth  Final   07/21/2022 No Growth  Final   07/20/2022 No Growth  Final   07/19/2022 No Growth  Final   07/18/2022 No Growth  Final   07/17/2022 No Growth  Final   07/16/2022 No Growth  Final   07/15/2022 No Growth  Final   07/13/2022 No Growth  Final   07/13/2022 No Growth  Final   07/13/2022 No Growth  Final   07/12/2022 No Growth  Final   07/12/2022 4+ Candida  albicans (A)  Final     Comment:     Susceptibilities not routinely done   07/12/2022 1+ Escherichia coli (A)  Final   07/12/2022 3+ Enterococcus faecalis (A)  Final   07/12/2022 No Growth  Final   07/12/2022 No Growth  Final   07/11/2022 No Growth  Final   07/10/2022 No Growth  Final   07/09/2022 No Growth  Final   05/19/2022 No Growth  Final       Last check of C difficile  C Difficile Toxin B by PCR   Date Value Ref Range Status   10/29/2022 Positive (A) Negative Final     Comment:     Detection of C. difficile nucleic acid in stools confirms the presence of these organisms in diarrheal patients but may not indicate that C. difficile is the etiologic agent of the diarrhea. Results from the Xpert C. difficile assay should be interpreted in conjunction with other laboratory and clinical data available to the clinician.    Patients with a positive C. difficile PCR result will receive reflex GDH/Toxin Immunoassay testing. Please interpret the PCR test result in conjunction with GDH/Toxin Immunoassay results and the clinical status of patient.       Imaging:    US abdomen limited 6/7/24   IMPRESSION:  1. Mildly increased echogenicity of the hepatic parenchyma relative to  the right renal cortex may be seen in hepatic steatosis.  2. Questionable gallstones in the neck of the gallbladder with biliary  sludge without evidence for acute cholecystitis.  3. The pancreas and common bile duct could not be visualized in this  study.  ---------------------------------------------------------------------------------------  Primary team:  Place order panel  [facility] ([facility]) Review and ID Care Transition   Place imaging order(s) requested above prior to discharge.  Contact ID teams about missed ID clinic appointments and/or ongoing therapy needs.    Prolonged Parenteral/Oral Antibiotic Recommendations and ID Follow up  This template provides final ID recommendations as of this date.     Infectious Diseases Indication: Chronic  "Driveline infection- polymicrobial     Antibiotic Information  Name of Antibiotic Dose of Antibiotic1 Anticipated duration Effective start date2 End date   Micafungin 100 mg daily  Unclear, to be determined at [facility] ID follow up 6/2 (fluc prior) TBD at ID follow up                 1.Dose of antibiotic will need to be renally adjusted if creatinine clearance changes  2.Effective start date is the date of adequate therapy with appropriate spectrum    Method of antibiotic delivery:Midline.Is the line being used for another indication besides antimicrobials? No At the end of therapy should the line used for antimicrobials be removed or de-accessed? Yes. Selecting \"yes\" will function as written order to remove PICC line or de-access the indwelling line at the end of therapy.    Weekly labs required: Per [facility] ID clinic    Are there pending microbial tests: No     Imaging for ID follow up: Per [facility] ID clinic      Provider: Follow up with [facility] clinic. No need for ID [facility] follow up.     ID provider route note: [facility] RN Care Coordinator [facility] ID Clinic Information:  Phone: [phone]  Fax: [phone] (Attention ID clinic nurses)    "

## 2024-06-10 NOTE — CONSULTS
Care Management Initial Consult    General Information  Assessment completed with: Patient,    Type of CM/SW Visit: Initial Assessment, but writer is familiar with patient from LVAD implant almost 2 years ago    Primary Care Provider verified and updated as needed: Yes   Readmission within the last 30 days: no previous admission in last 30 days to [facility], but patient reports hospitalizations in [place]        Advance Care Planning: Advance Care Planning Reviewed: present on chart          Communication Assessment  Patient's communication style: spoken language (English or Bilingual)             Cognitive  Cognitive/Neuro/Behavioral: WDL  Level of Consciousness: alert  Arousal Level: opens eyes spontaneously  Orientation: oriented x 4  Mood/Behavior: calm, cooperative  Best Language: 0 - No aphasia  Speech: clear, spontaneous, logical    Living Environment:   People in home: alone     Current living Arrangements: apartment      Able to return to prior arrangements: yes       Family/Social Support:  Care provided by: self, child(jesús)  Provides care for: no one  Marital Status: Single  Children          Description of Support System: Supportive, Involved    Support Assessment: Adequate family and caregiver support    Current Resources:   Patient receiving home care services: No     Community Resources: None  Equipment currently used at home: none  Supplies currently used at home: Wound Care Supplies    Employment/Financial:  Employment Status: disabled        Financial Concerns:     Referral to Financial Worker: No       Does the patient's insurance plan have a 3 day qualifying hospital stay waiver?  No    Lifestyle & Psychosocial Needs:  Social Determinants of Health     Food Insecurity: No Food Insecurity (5/23/2024)    Received from [facility]    Hunger Vital Sign     Worried About Running Out of Food in the Last Year: Never true     Ran Out of Food in the Last Year: Never true    Depression: Not at risk (5/3/2024)    Received from [facility]    PHQ-2     PHQ-2 Total : 0   Housing Stability: High Risk (5/23/2024)    Received from [facility]    Housing Stability Vital Sign     Unable to Pay for Housing in the Last Year: No     Number of Times Moved in the Last Year: 2     Homeless in the Last Year: No   Tobacco Use: Medium Risk (5/31/2024)    Received from [facility]    Patient History     Smoking Tobacco Use: Former     Smokeless Tobacco Use: Never     Passive Exposure: Not on file   Financial Resource Strain: Low Risk  (5/23/2024)    Received from [facility]    Overall Financial Resource Strain (CARDIA)     Difficulty of Paying Living Expenses: Not very hard   Alcohol Use: Not At Risk (5/31/2024)    Received from [facility]    AUDIT-C     Frequency of Alcohol Consumption: Never     Average Number of Drinks: Patient does not drink     Frequency of Binge Drinking: Never   Transportation Needs: No Transportation Needs (5/23/2024)    Received from [facility]    PRAPARE - Transportation     Lack of Transportation (Medical): No     Lack of Transportation (Non-Medical): No   Physical Activity: Insufficiently Active (6/27/2023)    Received from [facility], [facility]    Exercise Vital Sign     Days of Exercise per Week: 4 days     Minutes of Exercise per Session: 10 min   Interpersonal Safety: Not At Risk (6/27/2023)    Received from [facility], [facility]    Humiliation, Afraid, Rape, and Kick questionnaire     Fear of Current or Ex-Partner: No     Emotionally Abused: No     Physically Abused: No     Sexually Abused: No   Stress: No Stress Concern Present (6/27/2023)    Received from [facility], [facility]    [place] [employer] - Occupational Stress  Questionnaire     Feeling of Stress : Only a little   Social Connections: Not on file   Health Literacy: Not on file       Functional Status:  Prior to admission patient needed assistance:   Dependent ADLs:: Independent  Dependent IADLs:: Independent       Mental Health Status:  Mental Health Status: No Current Concerns       Chemical Dependency Status:  Chemical Dependency Status: No Current Concerns             Values/Beliefs:  Spiritual, Cultural Beliefs, Alevism Practices, Values that affect care:                 Additional Information:  Dandy was transferred to [facility] from an outside hospital in [place] for issues with continued diarrhea and poor quality of life. Upon admission, he was diagnosed with Noro Virus and thought to be lactose intolerant. He has been counseled on what to eat and not eat and to stay away from dairy and all cheese/ice cream, which he had been consuming outpatient, but reports not knowing he was consuming dairy. His diarrhea has gotten a lot better in the last 4 days and they are gearing him up for discharge in 2-3 days.     Per Patient, his Son-Nicholas will most likely be the one to pick him up if discharged during the week. And Dtr if on the weekend. Would like 1 day's notice if possible.    Patient endorses no home care or in home supports in place prior to admission. He was starting to feel like he couldn't leave the house due to the diarrhea and was considering goals of care discussions and turning off the LVAD. However, with this new knowledge about the potential culprit of his diarrhea, he is not considering turning off the pump and wanting to go home and live his life if possible.    SW will continue to follow for ongoing psychosocial support and assistance with discharge planning as needed.    Yas Ennis, MSW, [facility] HospitalSW  Heart Transplant/MCS   ph. [phone]  [employer]

## 2024-06-10 NOTE — PROGRESS NOTES
Neuro: A&Ox4. Forgetful, and short term memory loss. Unreliable historian.   Cardiac: Afebrile, VSS. Afib w/ BBB rate controlled intermittent. Otherwise SR w/PAC's and BBB  Respiratory: RA   GI/: Voiding spontaneously. No BM this shift. Last BM 6/9.   Diet/appetite: Tolerating lactose free, 2g na, 2L FR diet. Denies nausea   Activity: Up ad dianna in room, SBA longer distances.   Pain: Denies   Skin: No new deficits noted.  Lines: L SL Midline, R PIV  Drains: driveline (change dressing every other day; due tomorrow)  Replacement: none       -Social work consulted.   -prn miralax for constipation. Pt reports BM every other day. Last BM 6/9.   -RHC performed; Added digoxin PO.   -Heparin gtt maintained at 700 units/hr. Check 10a tomorrow   -LFT's down trending today after holding protonix, lipitor, and PO iron tablets yesterday. RUQ US performed for prior ascites noted on previous US and Abd CT.

## 2024-06-10 NOTE — CONSULTS
"  Health Psychology                                                                                                                          Shante Anne, Ph.D., L.P [phone]  Rocio Conner, Ph.D., L.P. [phone]  Faye Myers, Ph.D, L..P. [phone]  Chantal Monsalve, Ph.D., L.P. [phone]  Delbert eHlton, Ph.D., A.B.P.P., L.P. [phone]         Symone Zeng, Ph.D., L.P. [phone]       Radha Torres, Ph.D., A.B.P.P., LP [phone]           [facility], 3rd Floor  [address] [place] [address]    Inpatient Health Psychology Consultation    Date of Service:  06/10/24    BACKGROUND:  Per EMR: \"Dandy Sands is a 63 year old male admitted on 6/7/2024 as a transfer from [place] where he was most recently admitted 05/31/2024 for weakness, malaise, and persistent diarrhea in setting of chronic driveline infection now transferred to [facility] for ongoing ZAKI cares. He has a past medical history significant for ICM s/p HM3 placement 07/08/2022 at [facility], CAD s/p PCI to LAD 2005, in-stent restenosis with TISHA to mLAD and prox RCA 05/24/2022, HLD, c. Diff 09/2022, SLE, antiphospholipid syndrome, chronic anemia, anxiety, and other conditions.\"    Health psychology consulted by Dr. Majano, Cardiology, to assess for psychological factors that may or may not be contributing to deconditioning, changes in health status.    SUBJECTIVE:  Met with Dandy today to introduce Health Psychology services and discuss nature of referral.     Dandy shared that today he is feeling hopeful and more positive regarding his mood.  He attributes this to feeling that his medical team has discovered what has been contributing to his diarrhea.  He said that he is lactose intolerant and that changing his diet will hopefully help to reduce the diarrhea.  It is unclear if this is the only source of diarrhea or if it will continue.  Dandy reported that prior to diarrhea improvements he " was feeling much more depressed, noting wanting to turn off his LVAD at one point because his quality of life was so poor.  He said he was having multiple stools per day and it was drastically limiting his ability to leave the home and engage in activities he enjoys.  He describes himself as an active person and not being able to engage in activities was very challenging.  Currently feeling more hopeful about returning home and rebuilding strength, engaging in therapies to get stronger, and working on nutrition to increase weight.    Reviewed some of Dandy's broader experiences post LVAD surgery.  He shared that family's plan for dressing changes, medication adherence.  He is hopeful that he has more years to live with the LVAD.    Dandy presented as mildly anxious throughout the visit.  He also evidenced cognitive deficits that were confirmed with recent slums administration on which he scored 13.  Attempted to assess if there were also other psychological aspects contributing to deconditioning in recent months.  It is likely that anxiety about loose stools was reinforcing avoidance behaviors and isolated behaviors.     We discussed strategies to help reduce patterns of avoidance should symptoms continue to fluctuate with loose stools.  We also discussed strategies to make dietary changes and this will indeed help him reduce loose stools and diarrhea.  Validated his emotional experiences.  Reinforced his comments indicating he is motivated for change.  Discussed with Dandy how he feels about his cognitive abilities.  He said he has never had a good memory and has been using lists and sticky notes to help him remember things.  He also utilizes support from his children who live nearby him.      OBJECTIVE:  Agustin was lying back in his bed during our visit.  He reported improved mood, feeling hopeful.  Affect was mood and thought congruent.  Thought processes logical and linear.  Insight and judgment limited due to  cognitive difficulties.  Speech within normal limits.  Denied suicidal ideation.       ASSESSMENT:  Dandy reported increased stress and lower mood in the prior months which she attributed to chronic diarrhea or loose stools that were drastically limiting his functioning and quality of life.  He said at one point he was wanting to turn off the LVAD because he did not want to live this way.  Currently as he has experienced GI symptom improvement his mood has improved and he is wanting to live.  His understanding of the diarrhea is due to lactose intolerance and not making changes to his diet will prevent diarrhea in the future.    Dandy's cognitive functioning likely impacts his symptom reporting and ability to adhere to recommendations that will be required to make dietary changes.  However, he has implemented accommodations to help him remember such as making lists, writing things down.  He has written lists of foods to avoid and plans to get more recipes for things he can make with less dairy products.    Given his memory challenges, it would be a good plan for Dandy to create a log of stool patterns so providers have a better idea of what is going on.  Will also be helpful for his children to monitor for significant changes as it is hard to ascertain the true length of diarrhea patterns he has had in recent months.      Psychologically, Dandy experienced increased depressive symptoms and anxiety secondary to the diarrhea patterns which also impacted his behavior such as limiting going out of the home.  He is more hopeful now to reengage with activities and reports improved mood.      DIAGNOSIS:  Anxiety and depression secondary to medical condition      PLAN:  Will likely not follow-up prior to discharge unless re-consulted.     Faye Myers, PhD,   Clinical Health Psychologist  Phone: [phone]  Pager: [phone]      Time in: 3:35  Time out: 4:00

## 2024-06-11 ENCOUNTER — APPOINTMENT (OUTPATIENT)
Dept: OCCUPATIONAL THERAPY | Facility: CLINIC | Age: 64
DRG: 286 | End: 2024-06-11
Attending: INTERNAL MEDICINE
Payer: COMMERCIAL

## 2024-06-11 LAB
ALBUMIN SERPL BCG-MCNC: 4.1 G/DL (ref 3.5–5.2)
ALP SERPL-CCNC: 220 U/L (ref 40–150)
ALT SERPL W P-5'-P-CCNC: 335 U/L (ref 0–70)
ANION GAP SERPL CALCULATED.3IONS-SCNC: 12 MMOL/L (ref 7–15)
AST SERPL W P-5'-P-CCNC: 199 U/L (ref 0–45)
BILIRUB DIRECT SERPL-MCNC: 0.33 MG/DL (ref 0–0.3)
BILIRUB SERPL-MCNC: 0.6 MG/DL
BUN SERPL-MCNC: 18.7 MG/DL (ref 8–23)
CALCIUM SERPL-MCNC: 9.3 MG/DL (ref 8.8–10.2)
CHLORIDE SERPL-SCNC: 106 MMOL/L (ref 98–107)
CREAT SERPL-MCNC: 1.35 MG/DL (ref 0.67–1.17)
DEPRECATED HCO3 PLAS-SCNC: 18 MMOL/L (ref 22–29)
EGFRCR SERPLBLD CKD-EPI 2021: 59 ML/MIN/1.73M2
ERYTHROCYTE [DISTWIDTH] IN BLOOD BY AUTOMATED COUNT: 23.5 % (ref 10–15)
GLUCOSE SERPL-MCNC: 89 MG/DL (ref 70–99)
HCT VFR BLD AUTO: 29.8 % (ref 40–53)
HGB BLD-MCNC: 8.7 G/DL (ref 13.3–17.7)
INR PPP: 2.14 (ref 0.85–1.15)
MAGNESIUM SERPL-MCNC: 1.9 MG/DL (ref 1.7–2.3)
MCH RBC QN AUTO: 21 PG (ref 26.5–33)
MCHC RBC AUTO-ENTMCNC: 29.2 G/DL (ref 31.5–36.5)
MCV RBC AUTO: 72 FL (ref 78–100)
PHOSPHATE SERPL-MCNC: 3.7 MG/DL (ref 2.5–4.5)
PLATELET # BLD AUTO: 177 10E3/UL (ref 150–450)
POTASSIUM SERPL-SCNC: 4.1 MMOL/L (ref 3.4–5.3)
PROT SERPL-MCNC: 6.8 G/DL (ref 6.4–8.3)
RBC # BLD AUTO: 4.15 10E6/UL (ref 4.4–5.9)
SODIUM SERPL-SCNC: 136 MMOL/L (ref 135–145)
UFH PPP CHRO-ACNC: 0.14 IU/ML
UFH PPP CHRO-ACNC: 0.24 IU/ML
WBC # BLD AUTO: 3.9 10E3/UL (ref 4–11)

## 2024-06-11 PROCEDURE — 999N000147 HC STATISTIC PT IP EVAL DEFER: Performed by: PHYSICAL THERAPIST

## 2024-06-11 PROCEDURE — 97110 THERAPEUTIC EXERCISES: CPT | Mod: GO

## 2024-06-11 PROCEDURE — 85520 HEPARIN ASSAY: CPT | Performed by: STUDENT IN AN ORGANIZED HEALTH CARE EDUCATION/TRAINING PROGRAM

## 2024-06-11 PROCEDURE — 36592 COLLECT BLOOD FROM PICC: CPT | Performed by: STUDENT IN AN ORGANIZED HEALTH CARE EDUCATION/TRAINING PROGRAM

## 2024-06-11 PROCEDURE — 250N000013 HC RX MED GY IP 250 OP 250 PS 637: Performed by: INTERNAL MEDICINE

## 2024-06-11 PROCEDURE — 250N000013 HC RX MED GY IP 250 OP 250 PS 637: Performed by: PHYSICIAN ASSISTANT

## 2024-06-11 PROCEDURE — 250N000011 HC RX IP 250 OP 636: Mod: JZ | Performed by: PHYSICIAN ASSISTANT

## 2024-06-11 PROCEDURE — 80053 COMPREHEN METABOLIC PANEL: CPT | Performed by: STUDENT IN AN ORGANIZED HEALTH CARE EDUCATION/TRAINING PROGRAM

## 2024-06-11 PROCEDURE — 250N000013 HC RX MED GY IP 250 OP 250 PS 637

## 2024-06-11 PROCEDURE — 83735 ASSAY OF MAGNESIUM: CPT | Performed by: STUDENT IN AN ORGANIZED HEALTH CARE EDUCATION/TRAINING PROGRAM

## 2024-06-11 PROCEDURE — 258N000003 HC RX IP 258 OP 636: Mod: JZ | Performed by: PHYSICIAN ASSISTANT

## 2024-06-11 PROCEDURE — 82248 BILIRUBIN DIRECT: CPT | Performed by: PHYSICIAN ASSISTANT

## 2024-06-11 PROCEDURE — 97535 SELF CARE MNGMENT TRAINING: CPT | Mod: GO

## 2024-06-11 PROCEDURE — 84100 ASSAY OF PHOSPHORUS: CPT | Performed by: STUDENT IN AN ORGANIZED HEALTH CARE EDUCATION/TRAINING PROGRAM

## 2024-06-11 PROCEDURE — 99233 SBSQ HOSP IP/OBS HIGH 50: CPT | Performed by: NURSE PRACTITIONER

## 2024-06-11 PROCEDURE — 85610 PROTHROMBIN TIME: CPT

## 2024-06-11 PROCEDURE — 99233 SBSQ HOSP IP/OBS HIGH 50: CPT | Performed by: CLINICAL NURSE SPECIALIST

## 2024-06-11 PROCEDURE — 85027 COMPLETE CBC AUTOMATED: CPT | Performed by: STUDENT IN AN ORGANIZED HEALTH CARE EDUCATION/TRAINING PROGRAM

## 2024-06-11 PROCEDURE — 120N000003 HC R&B IMCU UMMC

## 2024-06-11 PROCEDURE — 250N000013 HC RX MED GY IP 250 OP 250 PS 637: Performed by: STUDENT IN AN ORGANIZED HEALTH CARE EDUCATION/TRAINING PROGRAM

## 2024-06-11 RX ORDER — WARFARIN SODIUM 3 MG/1
3 TABLET ORAL
Status: COMPLETED | OUTPATIENT
Start: 2024-06-11 | End: 2024-06-11

## 2024-06-11 RX ADMIN — WARFARIN SODIUM 3 MG: 3 TABLET ORAL at 17:40

## 2024-06-11 RX ADMIN — Medication 12.5 MG: at 22:34

## 2024-06-11 RX ADMIN — TAMSULOSIN HYDROCHLORIDE 0.4 MG: 0.4 CAPSULE ORAL at 08:22

## 2024-06-11 RX ADMIN — POTASSIUM CHLORIDE 20 MEQ: 750 TABLET, EXTENDED RELEASE ORAL at 08:21

## 2024-06-11 RX ADMIN — Medication 1 TABLET: at 08:21

## 2024-06-11 RX ADMIN — GABAPENTIN 100 MG: 100 CAPSULE ORAL at 08:22

## 2024-06-11 RX ADMIN — HYDROXYZINE HYDROCHLORIDE 10 MG: 10 TABLET ORAL at 22:34

## 2024-06-11 RX ADMIN — ASPIRIN 81 MG: 81 TABLET ORAL at 08:21

## 2024-06-11 RX ADMIN — Medication 300 MG: at 08:47

## 2024-06-11 RX ADMIN — MICAFUNGIN SODIUM 100 MG: 50 INJECTION, POWDER, LYOPHILIZED, FOR SOLUTION INTRAVENOUS at 16:43

## 2024-06-11 RX ADMIN — GABAPENTIN 100 MG: 100 CAPSULE ORAL at 14:26

## 2024-06-11 RX ADMIN — GABAPENTIN 100 MG: 100 CAPSULE ORAL at 20:11

## 2024-06-11 RX ADMIN — SULFAMETHOXAZOLE AND TRIMETHOPRIM 1 TABLET: 800; 160 TABLET ORAL at 08:21

## 2024-06-11 RX ADMIN — SULFAMETHOXAZOLE AND TRIMETHOPRIM 1 TABLET: 800; 160 TABLET ORAL at 20:10

## 2024-06-11 RX ADMIN — DIGOXIN 125 MCG: 125 TABLET ORAL at 08:22

## 2024-06-11 ASSESSMENT — ACTIVITIES OF DAILY LIVING (ADL)
ADLS_ACUITY_SCORE: 46

## 2024-06-11 NOTE — PLAN OF CARE
Hours of Care:  1900 - 0700    Temp:  [97.3  F (36.3  C)-98  F (36.7  C)] 97.6  F (36.4  C)  Pulse:  [77-90] 77  Resp:  [16] 16  BP: (90)/(76) 90/76  SpO2:  [93 %-100 %] 94 %     D: Dandy Sands is a 63 year old male admitted on 6/7/2024 as a transfer from [place] where he was most recently admitted 05/31/2024 for weakness, malaise, and persistent diarrhea in setting of chronic driveline infection now transferred to [facility] for ongoing ZAKI cares. He has a past medical history significant for ICM s/p HM3 placement 07/08/2022 at [facility], CAD s/p PCI to LAD 2005, in-stent restenosis with TISHA to mLAD and prox RCA 05/24/2022, HLD, c. Diff 09/2022, SLE, antiphospholipid syndrome, chronic anemia, anxiety, and other conditions.     I: Monitored vitals and assessed pt status.     Running: Heparin 850 units/hr  PRN: [place], atarax  Tele: A fib, BBB, rare V paced  O2: Room air  Mobility: Independent in room/SBA    A: Neuro: A/O x4.  Call light appropriate.  Able to make needs known.  Respiratory:  On room air.  Lung sounds clear.  Denies shortness of breath at rest.  Cardiac: VSS.  Doppler MAP.  A fib.  ICD AAI <--> DDD .  Rare V Pace.  HM3.  Dressing change every other day with sensitive skin kit per pt.  All numbers within ordered parameters.  No alarms overnight.  Received 4 mg Coumadin.  INR 1.81 (6/10).  On heparin gtt.  850 units/hr.  Xa not stable.  Next draw 1116.  No s/s of bleeding.  GI: Last BM 6/9.  No report of nausea or vomiting.  : Urinating adequate amounts of clear, yellow urine  Skin:  See PCS for assessment and treatment of wounds and surgical incisions.  LDA:  Lt midline, Rt PIV  Drips:  Heparin  Electrolytes: No RN managed electrolyte protocols ordered.  Pain: Denies  Isolation:  Standard Precautions  Tests/procedures: None performed overnight.  Diet: Lactose free.  2000 mL fluid restriction  Sleep:  No sleep disturbances noted or reported.    P: Continue to monitor Pt status and report changes to  Cards 2.          86 M with HTN, HLD, s/p TKR, presented on 11/18 with L leg erythema and swelling for 2 months, found to have multivessel disease, s/p CABG x3v on 11/25, course c/b Afib with RVR.    #Afib with RVR  - Continue heparin gtt.  - Continue esmolol gtt.  - Currently on amiodarone gtt.  - On inotropic support: milrinone. Diuresing with bumex 1 mg bid.

## 2024-06-11 NOTE — PROGRESS NOTES
"[id number]    BP 90/76 (BP Location: Right arm)   Pulse 93   Temp 97.6  F (36.4  C) (Oral)   Resp 16   Ht 1.702 m (5' 7\")   Wt 64.3 kg (141 lb 12.8 oz)   SpO2 99%   BMI 22.21 kg/m       Neuro: A&Ox4.   Cardiac: Afebrile, A-Fib w/ Multiform PVCs   Respiratory: RA   GI/: Voiding spontaneously. No BM this shift.   Diet/appetite: Tolerating Lactose free diet w/ 2L Fluid Restriction. Denies nausea   Activity: Up with assist x1   Pain: Denies   Skin: No new deficits noted.  Lines: Midline Single Lumen SL, R AC PIV SL  Drains: None  Replacement: None    Pt will have a care conference with kids tomorrow at noon.  "

## 2024-06-11 NOTE — PLAN OF CARE
PT: Orders received. Chart reviewed and discussed with care team, specifically OT following pt.  PT not indicated due to lack of skilled need/concerns of duplication of services.  OT already following pt for CR (walking, Nu-Step, HEP) and notes pt moving well without specific gait or balance concerns. OT will address HEP needs. Defer discharge recommendations to OT.  Will complete orders.

## 2024-06-11 NOTE — PROGRESS NOTES
[facility]   Cardiology II Service / Advanced Heart Failure  Daily Progress Note      Patient: Dandy Sands  MRN: [id number]  Admission Date: 6/7/2024  Hospital Day # 4    Assessment and Plan: Dandy Sands is a 63 year old male admitted on 6/7/2024 as a transfer from [place] where he was most recently admitted 5/31/2024 for weakness, malaise, and persistent diarrhea in setting of chronic driveline infection now transferred to [facility] for ongoing ZAKI cares. He has a past medical history significant for ICM s/p HM3 placement 7/8/2022 at [facility], CAD s/p PCI to LAD 2005, in-stent restenosis with TISHA to mLAD and prox RCA 05/24/2022, HLD, c. Diff 9/2022, SLE, antiphospholipid syndrome, chronic anemia, anxiety, and other conditions.     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 At that time they were opting for ongoing treatment with dalbavancin, micafungin, and bactrim, although they were less certain that candida was active contributor vs colonizer. 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 Per review of previous 2023 discharge summary also had 1+ corynebacterium striatum from OR wound cultures at that time.  Per OSH radiology read 06/03/2024 of CT abdomen/pelvis w/o contrast, no acute abnormalities but with mild ascites.  BC 05/31/2024: negative at [place].  - Re-engaging ID for plan for yeast treatment given ongoing LFT dysfunction, would prefer to not use fluconazole at this time  - Please see H&P bottom/data section for copied positive  [place] culture results dating back through 2022  - Now willing to continue Dalbavancin as this does not correlate with his diarrhea (initially was declining this as he attributed his diarrhea to this)  - Continue micafungin 100 mg IV Q24H until [place] follow-up  -  will need to assess his ability to do IV medication at home  - Continue dalbavancin every two weeks (due 6/14)  - if he remains in the hospital will need to coordinate with pharmacy to see if we can get an exception to give this in the hospital)  - Continue Bactrim 800-160 BID through 06/11 then consider suppressive dosing (LFTs were rising before starting bactrim)- f/up id recs    Somewhat unclear exactly what regimen he has been on over last hospital stay and other recent stay, although not a comprehensive list brief review reveals the following:   Vancomycin (05/23-05/24)  Micafungin (05/24-05/27, 06/02-**)  Fluconazole (05/27-06/01)  Bactrim (05/31-**)  Cefepime (5/23 - 5/27)  Dalbavancin (06/2023-present)    Ciprofloxacain (leading up to 05/23 admission was on 250 mg PO PTA for suppression, unclear duration)      #s/p HM3 LVAD 07/08/2022 due to ICM  #s/p CRT-D (2006) s/p RV lead upgrade 1/13/23   #s/p LVAD 7/8/12 (Likely medtronic device remains from 03/2006 placement although not certain)   #CAD s/p PCI to LAD 2005, insent restenosis and TISHA mLAD, Prox RCA 05/2022    6/10/24 RHC RA 7, PA 19/9/12, PCW 1, AZUL CO/CI 2.96/1.7, PVR 4.06, SVR 1600    Speed: Continue PTA LVAD speed at 5100, discussed decreasing d/t elevated RA and very low pcw, but deferring in favor of digoxin for now, may need to consider in the future pending clinic al course  RV support: digoxin 125 mcg daily, check level ~6/15  ACEi/ARB/ARNi: historically was on lisinopril and hydralazine stopped due to dizziness and low maps  BB: historical documentation reflects post-operative RV failure, hypotension, and low flows   SGLT2i: none currently, previous documentation reflects concern for  immunosuppression and concern for infections/unclear evidence in LVAD patients  MRA: none, reportedly due to low maps  Anticoagulation: warfarin INR goal of 2-3, ok to stop heparin gtt  ASA: not indicated  Volume status: hypovoemic, lasix stopped 6/10    Other: Discussed with patient and daughter that he will need to come back to [place] for VD folllow-up at least twice per year per policy. Will try to arrange with Dr. Stewart in [place] during outreach clinics for inbetween visits, Dr. Lora will remain primary cardiologist. Dispo pending.      # Abnormal LFTs  AST/ALT 100s to 300s since at least 06/01 at [place], on admission /. Previously thought related to congestive hepatopathy, however RA ~5. Bilirubin WNL, alk phos 237. RUQ ultrasound with mildly increased echogenicity of the hepatic parenchyma with possible hepatic steatosis. Also considering that this could be drug induced.  - GI consulted (luminal signed off, hepatology to follow)    - recs pending  - Trend LFTs daily  - HOLDING lipitor, protonix, and po iron  - Continuing bactrim for now as that was started after the liver dysfunction was already happening  - F/up smooth muscle antibody pending (ordered by GI)  - PeTH pending    # Diarrhea  Patient reports significant history of frequent diarrhea since being on antibiotics for his LVAD, which per his report he has been on nearly since time of placement. His most significant concern and what he finds most puzzling is that it typically goes away in the hospital and returns once home. He believes it may be due to dalbavancin although he did get a dose 05/31 and has not had diarrhea in some time so unclear. This is incredibly life limiting for him as he states he cannot even make it 1 hour without need for BM some days. Upon further review, he is eating a considerable amount of lactose (cheese quite regularly, childresns size ice cream cones, possibly sauces with cheese) and a lot of greesy food  as well. HE has not had any diarrhea since 5/31.  - GI consulted, luminal signed off  - Enteric panel and C. Diff negative 05/23/2024 at [place]  - +norovirus, but having formed stools, so not correlating clinically, continue enteric precautions  - Pending fecal calprotectin- per GI, expect this to be elevated given positive norovirus  - Pending H. Pylori stool antigen  - STRICT no lactose diet  - RD consult for further education  - send home with stool diary template     # C. Diff  2 admission for C. Diff 09/2022, negative 05/23/2024. No current diarrhea  - monitor    # Hemorrhoids  Patient interested in having these surgically fixed if possible as he states the surgeons at [place] will not do an operation to fix them because he has a LVAD  - Consider surgical consult pending clinical course      # HLD  - holding atorvastatin 40 mg PO daily as above    # SLE, antiphospholipid syndrome  - PTA hydroxychloroquine   - therapeutic INR as above     # Chronic anemia  Baseline Hgb 8.0-9.0 chronically  - F/up LDH and haptoglobin along with admission CBC  - s/p Infed on 6/10, no further doses required  - Would set up with pcp or [facility] clinic in [place] on discharge as he would benefit from intermittent IV iron rather than PO iron  - STOPPED PO iron d/t elevated LFTS     #Code Status  Discussed this with patient during admission. He quite explicitly does not want chest compressions or to be resuscitated in a cardiac arrest situation, although would be ok with pre-arrest intubation. We discussed at some length how this code status creates a unique situation for him given presence of LVAD and how this would preclude chest compressions and that his ICD would likely shock him out of arrhythmias should they arise. He is accepting of this at the present time and understands that he may get a shock from his ICD. Therefore the most reasonable code status that closely matches his wishes seems to be DNR with an ok for pre-arrest  "intubation only.    - palliative care consulted  - DNR, ok for pre-arrest intubation  - Understands and accepts he may get shocks from his ICD  - Health psychology consult ordered    # JOSE, prerenal  - Holding lasix as above   - Digoxin as above  - Daily BMP    Diet: Fluid restriction 2000 ML FLUID  2 Gram Sodium Diet    lactose free diet  DVT Prophylaxis: Warfarin  Fitzgerald Catheter: Not present  Cardiac Monitoring: None  Code Status:  DNR, ok for pre-arrest intubation    Naye Sharpe DNP, NP-C  Nurse Practitioner - Advanced Heart Failure/Cardiology II Service  Vocera preferred or Pager [phone]    Patient discussed with Dr. Stewart      70 minutes spent on the date of the encounter doing chart review, history and exam, documentation and further activities per the note    ================================================================    Subjective/24-Hr Events:   Last 24 hr care team notes reviewed. He is feeling ok. No BM since yesterday. No fevers, chills, new symptoms. Not sleeping well.     ROS:  4 point ROS including respiratory, CV, GI and  (other than that noted in the HPI) is negative.     Medications: Reviewed in EPIC.     Physical Exam:   BP 90/76 (BP Location: Right arm)   Pulse 80   Temp 97.3  F (36.3  C) (Oral)   Resp 16   Ht 1.702 m (5' 7\")   Wt 64.3 kg (141 lb 12.8 oz)   SpO2 99%   BMI 22.21 kg/m      GENERAL: Appears comfortable, in no distress .  HEENT: Eye symmetrical, no discharge or icterus bilaterally.   NECK: Supple, JVD <6.   CV: home of lvad, no adventitious lung sounds  RESPIRATORY: Respirations regular, even, and unlabored. Lungs CTA throughout.    GI: Soft and non distended   EXTREMITIES: No peripheral edema.   NEUROLOGIC: Alert and interacting appropriatly.  MUSCULOSKELETAL: No joint swelling or tenderness.   SKIN: No jaundice.     Labs:  CMP  Recent Labs   Lab 06/11/24  0430 06/10/24  0325 06/09/24  0449 06/08/24  0435    133* 133* 132*   POTASSIUM 4.1 4.0 3.8 4.1 "   CHLORIDE 106 103 102 103   CO2 18* 17* 16* 17*   ANIONGAP 12 13 15 12   GLC 89 97 100* 104*   BUN 18.7 20.6 23.5* 27.5*   CR 1.35* 1.44* 1.48* 1.25*   GFRESTIMATED 59* 55* 53* 65   ELDA 9.3 9.3 9.3 9.3   MAG 1.9 1.8 1.8 1.9   PHOS 3.7 3.4 3.9 3.6   PROTTOTAL 6.8 7.2 7.2 7.1   ALBUMIN 4.1 4.2 4.2 4.2   BILITOTAL 0.6 0.7 0.8 0.8   ALKPHOS 220* 237* 241* 236*   * 245* 320* 288*   * 381* 419* 364*       CBC  Recent Labs   Lab 06/11/24  0430 06/10/24  1100 06/10/24  0325 06/09/24  0449 06/08/24  1049   WBC 3.9*  --  4.1 4.8 4.6   RBC 4.15*  --  4.42 4.44 4.39*   HGB 8.7* 9.5* 9.3* 9.3* 9.2*   HCT 29.8*  --  31.3* 31.3* 31.0*   MCV 72*  --  71* 71* 71*   MCH 21.0*  --  21.0* 20.9* 21.0*   MCHC 29.2*  --  29.7* 29.7* 29.7*   RDW 23.5*  --  22.7* 22.7* 22.8*     --  205 205 189       INR  Recent Labs   Lab 06/11/24  0430 06/10/24  0325 06/09/24  0449 06/08/24  0435   INR 2.14* 1.81* 1.79* 1.73*

## 2024-06-11 NOTE — PROCEDURES
The patient's HeartMate LVAD was interrogated 6/11/2024  * Speed 5100 rpm   * Pulsatility index 4.5   * Power 3.4 Kellogg   * Flow 3.3 L/minute   Fluid status: euvolemic   Alarms were reviewed, and notable for PI events.   The driveline exit site was inspected, dressing cdi.   All external components were inspected and showed no evidence of damage or malfunction, none replaced.   No changes to VAD settings made

## 2024-06-11 NOTE — PROGRESS NOTES
D: Stopped by to see patient. No VAD related questions or concerns at this time.   I: Discussed POC and provided support and listened to patient and caregiver's thoughts and concerns.  P: Continue to follow patient and address any questions or concerns patient and or caregiver may have.      Indication of Interrogation:  Routine during admission     Type of VAD:  Heartmate 3    Current Parameters:  Flow= 3.3 lpm, Speed= 5100 rpm, Power= 3.4 persaud, PI (if applicable)= 6.4    Abnormal Alarm on History:  No    Abnormal Events/Parameters Notes:  No    Changes Made during Interrogation:  No

## 2024-06-11 NOTE — PROGRESS NOTES
"Met with patient to evaluate their ability to safely care for themselves and their VAD.     Writer had patient perform   Power change from wall power to batteries, Patient is able to perform without difficulties   Controller change, Patient is unable to perform due to inability to identify different cords. Required extensive prompting and unable to distinguish between power cord female to male connections with verbal prompting.      Verbal assessment performed, asking the patient what their response would be to following alarms:   Low flow alarm,  partially able to verbalize the correct answer. Said that he would page the VAD coordinator on call, but did not mention contacting EMS if symptomatic. Pt questioned why he would contact EMS.     External power alarm, patient is able to verbalize correct response to alarm    Power cable disconnect, patient is able to verbalize correct response to alarm   Low power, patient is unable to verbalize correct response to alarm \"I would just call 911 I guess\". Unable to verbalize correct response even with guiding questions  Yellow wrench alarm, patient is unable to verbalize correct response to alarm     Asked patient to verbalize the following:   Two things that patient should avoid because of the VAD, patient did not verbalize correct answer   What changes in VAD parameters require notification to the VAD team,  patient did not verbalize correct answer   List 3 VAD daily checks, patient did not verbalize correct answer    How to page on-call VAD coordinator, patient verbalized correct answer     Based on the evaluation by this writer, the patient safely manage VAD: can not safely manage their VAD independently. Cardiology team and social work updated.   "

## 2024-06-11 NOTE — PROGRESS NOTES
"PALLIATIVE CARE PROGRESS NOTE  [facility]     Patient Name: Dandy Sands  Date of Admission: 6/7/2024   Today the patient was seen for: goal of care     Recommendations & Counseling       GOALS OF CARE:   DNR, OK with pre-arrest intubation  He indicates if he can no longer maintain his life in his home with support he would more seriously consider discontinuing the LVAD support  Consider care conference with family to discuss plan of care and goals    ADVANCE CARE PLANNING:  Patient has an advance directive dated 05/11/2022.  Primary Health Care Agent Asha- daughter.  Alternate(s) Amos- son.   There is no POLST form on file, recommend to complete prior to DC.  Code status: No CPR- Pre-arrest intubation OK    MEDICAL MANAGEMENT:   Not discussed today, will follow up about symptoms further at next visit.    PSYCHOSOCIAL/SPIRITUAL:  Children are main support    Palliative Care will continue to follow. Thank you for the consult and allowing us to aid in the care of Dandy Sands.    These recommendations have been discussed with primary team.    YAO Mckeon CNS  [facility], Palliative Care  Securely message with the Vocera Web Console (learn more here) or  Text page via [employer] Paging/Directory       Interval History:     Multidisciplinary collaboration:  Notes reviewed, liver labs elevated which is thought likely to be multifactorial. MCS coordinator evaluated and concerned about memory with how to respond to various LVAD alarms and recommended considering more support.     Notable medications:  Gabapentin 100 mg TID  Hydroxyzine PRN -x1  Seroquel PRN -x1    Patient/family narrative  Seen today in bed. No specific complaints.    He seems to appreciate his liver labs are abnormal and feels like we are just \"waiting\" to see them improve. He does understand these can be abnormal from various causes. Discussed if his family was providing support and if they can " provide more if needed. He feels that they are already stretched pretty thin and likely cannot provide more support. Discussed that he might be coming to a point where he needs more help and we are going to have to talk about the next steps. Discussed having a care meeting with his family to help bring everyone together to get updates and plan for next steps. Dandy does indicate if he can no longer maintain in his home his plan would be to stop the LVAD, and discussed if he would stop his LVAD and home or in a care place and he was not sure. Will need to discuss further with his family present the next steps.    Assessment          Dandy Sands is a 63 year old male with a past medical history of ICM s/p HM3 in 2022, CAD s/p PCI in 2005, in stent restenosis with TISHA to mLAD, and prox PCA in 05/2022, HLD, SLE, antiphosholipid syndrome, chronic anemia, anxiety who presented on 5/31 with weakness, malaise, persistent diarrhea to [place] and transferred to [facility] for further cares given his chronic driveline infection.       Today, the patient was seen for:  ICM s/p HM3  Chronic driveline infection  Severe diarrhea  Malnutrition  Anemia  Anxiety  Insomnia           Review of Systems:     Besides above, ROS was reviewed and is unremarkable        Physical Exam:   Temp:  [97.3  F (36.3  C)-97.9  F (36.6  C)] 97.3  F (36.3  C)  Pulse:  [77-90] 80  Resp:  [16-17] 16  SpO2:  [93 %-99 %] 99 %  141 lbs 12.8 oz    Constitutional: Awake, alert, cooperative, frail and cachectic, no apparent distress  Lungs: No increased work of breathing, good air exchange  Cardiovascular: HM3  Neurologic: Awake, alert, oriented to name, place and time.    Skin: No rashes, erythema            Data Reviewed:     Results for orders placed or performed during the hospital encounter of 06/07/24 (from the past 24 hour(s))   US Abdominal Doppler Complete    Narrative    EXAMINATION: Right upper quadrant Doppler ultrasound, 6/10/2024 3:11  PM      COMPARISON: 6/7/2024 right upper quadrant ultrasound.    HISTORY: Doppler and flow evaluation per GI.    TECHNIQUE:  Grey-scale, color Doppler and spectral flow analysis of  the right upper quadrant vasculature.    FINDINGS:  LIVER DOPPLER:  Splenic vein: Could not identify the splenic vein on this study  secondary to obscuration by LVAD device.  Extrahepatic portal vein:  Patent continuous antegrade flow, 42  cm/sec.  Right portal vein flow is antegrade, measuring 25 cm/sec.  Left portal vein flow is antegrade, measuring 19 cm/sec.    Inferior vena cava patent with flow toward the heart throughout..  Extrahepatic IVC:  20 cm/sec.    Right, mid, left hepatic veins: Patent with flow towards the inferior  vena cava.    Extrahepatic hepatic artery: Low resistance waveform with flow towards  the liver. 23 cm/sec with resistive index 0.66.      Impression    Impression:   1.  Normal Doppler examination of the right upper quadrant.   2.  The splenic vein could not be assessed as it was obscured by the  LVAD device.    I have personally reviewed the examination and initial interpretation  and I agree with the findings.    RAHEEM MEAD MD         SYSTEM ID:  [id number]   INR   Result Value Ref Range    INR 2.14 (H) 0.85 - 1.15   CBC with platelets   Result Value Ref Range    WBC Count 3.9 (L) 4.0 - 11.0 10e3/uL    RBC Count 4.15 (L) 4.40 - 5.90 10e6/uL    Hemoglobin 8.7 (L) 13.3 - 17.7 g/dL    Hematocrit 29.8 (L) 40.0 - 53.0 %    MCV 72 (L) 78 - 100 fL    MCH 21.0 (L) 26.5 - 33.0 pg    MCHC 29.2 (L) 31.5 - 36.5 g/dL    RDW 23.5 (H) 10.0 - 15.0 %    Platelet Count 177 150 - 450 10e3/uL   Comprehensive metabolic panel   Result Value Ref Range    Sodium 136 135 - 145 mmol/L    Potassium 4.1 3.4 - 5.3 mmol/L    Carbon Dioxide (CO2) 18 (L) 22 - 29 mmol/L    Anion Gap 12 7 - 15 mmol/L    Urea Nitrogen 18.7 8.0 - 23.0 mg/dL    Creatinine 1.35 (H) 0.67 - 1.17 mg/dL    GFR Estimate 59 (L) >60 mL/min/1.73m2    Calcium 9.3 8.8 -  10.2 mg/dL    Chloride 106 98 - 107 mmol/L    Glucose 89 70 - 99 mg/dL    Alkaline Phosphatase 220 (H) 40 - 150 U/L     (H) 0 - 45 U/L     (H) 0 - 70 U/L    Protein Total 6.8 6.4 - 8.3 g/dL    Albumin 4.1 3.5 - 5.2 g/dL    Bilirubin Total 0.6 <=1.2 mg/dL   Magnesium   Result Value Ref Range    Magnesium 1.9 1.7 - 2.3 mg/dL   Phosphorus   Result Value Ref Range    Phosphorus 3.7 2.5 - 4.5 mg/dL   Heparin Unfractionated Anti Xa Level   Result Value Ref Range    Anti Xa Unfractionated Heparin 0.24 For Reference Range, See Comment IU/mL    Narrative    Therapeutic Range: UFH: 0.25-0.50 IU/mL for low intensity dosing,  0.30-0.70 IU/mL for high intensity dosing DVT and PE.  This test is not validated for other direct factor X inhibitors (e.g. rivaroxaban, apixaban, edoxaban, betrixaban, fondaparinux) and should not be used for monitoring of other medications.   Bilirubin direct   Result Value Ref Range    Bilirubin Direct 0.33 (H) 0.00 - 0.30 mg/dL   Heparin Unfractionated Anti Xa Level   Result Value Ref Range    Anti Xa Unfractionated Heparin 0.14 For Reference Range, See Comment IU/mL    Narrative    Therapeutic Range: UFH: 0.25-0.50 IU/mL for low intensity dosing,  0.30-0.70 IU/mL for high intensity dosing DVT and PE.  This test is not validated for other direct factor X inhibitors (e.g. rivaroxaban, apixaban, edoxaban, betrixaban, fondaparinux) and should not be used for monitoring of other medications.     *Note: Due to a large number of results and/or encounters for the requested time period, some results have not been displayed. A complete set of results can be found in Results Review.     Recent Labs   Lab 06/11/24  0430 06/10/24  1100 06/10/24  0325 06/09/24  0449   WBC 3.9*  --  4.1 4.8   HGB 8.7* 9.5* 9.3* 9.3*   MCV 72*  --  71* 71*     --  205 205   INR 2.14*  --  1.81* 1.79*     --  133* 133*   POTASSIUM 4.1  --  4.0 3.8   CHLORIDE 106  --  103 102   CO2 18*  --  17* 16*   BUN  18.7  --  20.6 23.5*   CR 1.35*  --  1.44* 1.48*   ANIONGAP 12  --  13 15   ELDA 9.3  --  9.3 9.3   GLC 89  --  97 100*   ALBUMIN 4.1  --  4.2 4.2   PROTTOTAL 6.8  --  7.2 7.2   BILITOTAL 0.6  --  0.7 0.8   ALKPHOS 220*  --  237* 241*   *  --  381* 419*   *  --  245* 320*       Recent Results (from the past 24 hour(s))   US Abdominal Doppler Complete    Narrative    EXAMINATION: Right upper quadrant Doppler ultrasound, 6/10/2024 3:11  PM     COMPARISON: 6/7/2024 right upper quadrant ultrasound.    HISTORY: Doppler and flow evaluation per GI.    TECHNIQUE:  Grey-scale, color Doppler and spectral flow analysis of  the right upper quadrant vasculature.    FINDINGS:  LIVER DOPPLER:  Splenic vein: Could not identify the splenic vein on this study  secondary to obscuration by LVAD device.  Extrahepatic portal vein:  Patent continuous antegrade flow, 42  cm/sec.  Right portal vein flow is antegrade, measuring 25 cm/sec.  Left portal vein flow is antegrade, measuring 19 cm/sec.    Inferior vena cava patent with flow toward the heart throughout..  Extrahepatic IVC:  20 cm/sec.    Right, mid, left hepatic veins: Patent with flow towards the inferior  vena cava.    Extrahepatic hepatic artery: Low resistance waveform with flow towards  the liver. 23 cm/sec with resistive index 0.66.      Impression    Impression:   1.  Normal Doppler examination of the right upper quadrant.   2.  The splenic vein could not be assessed as it was obscured by the  LVAD device.    I have personally reviewed the examination and initial interpretation  and I agree with the findings.    RAHEEM MEAD MD         SYSTEM ID:  [id number]         Medical Decision Making       MANAGEMENT DISCUSSED with the following over the past 24 hours: cardiology, nursing   NOTE(S)/MEDICAL RECORDS REVIEWED over the past 24 hours: cardiology, nursing, MCS coordinator, health psychology, GI  Tests REVIEWED in the past 24 hours:  - See lab/imaging results  included in the data section of the note

## 2024-06-11 NOTE — PROGRESS NOTES
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 He was noted to have elevated transaminases starting on 5/10 with ALT of 76 with peak t. Bili of 1.5 on 5/23, Alk phos 241 on 6/9,  and  on 6/9.     RECOMMENDATIONS:  # Elevated liver tests  # DILI  # Hepatic congestion  # ICM on HM3  # Chronic driveline infection with suppressive abx  # SLE and antiphospholipid syndrome  - T. Bili only initially elevated in may, now wnl, not fractionated at that time. This has been mixed direct/indirect.   - followed by rheumatology for SLE and APS. US doppler without evidence of thrombosis. F-actin negative. Viral serologies negative.   - Cardiac cath with normal heart pressures with LVAD, RA 8. Abdominal imaging without significant hepatomegaly, did have ascites on CT scan prior to transfer. Not enough for sample.   - alk phos, AST, ALT improving.  INR elevated but remains on coumadin.   - Cause of elevated liver tests likely multifactorial with cholestasis of chronic illness with driveline infection, antibiotics causing transient increase/DILI, recent fluid volume overload in setting of HM3 and ICM contributing to hepatic congestion.  Continue to trend.   - Discussed with Mr. Sands that due to underlying heart dysfunction, he may have component of worsening fibrosis. Do not recommend liver biopsy at this time.   - Advised him to continue with low sodium diet as directed by his heart failure team.     # Immunization  - prior HBV and HAV antibody levels negative. Recommend starting HAV and HBV or combination vaccine at time of discharge.     Patient was discussed with attending physician, Dr. Leventhal.  The above note  "reflects our joint plan.     Will sign off.     Thank you for the opportunity to be involved with  Dandy mahan. Please call with any questions or concerns.    YAO Gonzalez, CNP  Inpatient Hepatology [facility]        SUBJECTIVE:  Reports overall feeling improved since admission. Denies dyspnea, abdominal pain and stools have improved since restricting lactose. He reports prior to admission that he had several diarrhea episodes (seen by luminal GI here) and did have fluid retention that improved with diuresis. He denies abdominal distention. He reports has been eating out more often when at home and will now be adhering to lactose free and low sodium diet.     ROS:  A 10-point review of systems was negative.    OBJECTIVE:  VS: BP 90/76 (BP Location: Right arm)   Pulse 78   Temp 97.9  F (36.6  C) (Oral)   Resp 17   Ht 1.702 m (5' 7\")   Wt 64.3 kg (141 lb 12.8 oz)   SpO2 94%   BMI 22.21 kg/m     Date 06/11/24 0700 - 06/12/24 0659   Shift 8313-3176 1814-3796 5014-8999 24 Hour Total   INTAKE   P.O. 480   480   Shift Total(mL/kg) 480(7.46)   480(7.46)   OUTPUT   Shift Total(mL/kg)       Weight (kg) 64.32 64.32 64.32 64.32      General: In no acute distress, no facial muscle wasting  Neuro: AOx3, No asterixis  HEENT:  Noscleral icterus, Nooral lesions  CV:  Skin warm and dry,. LVAD  Lungs:  Respirations even and nonlabored on room air  Abd:  Nondistended, nontender   Extrem:  +1 lower  peripheral edema   Skin: Nojaundice  Psych: pleasant    MEDICATIONS:  Current Facility-Administered Medications   Medication Dose Route Frequency Provider Last Rate Last Admin    aspirin EC tablet 81 mg  81 mg Oral Daily Darvin Santana MD   81 mg at 06/11/24 0821    [Held by provider] atorvastatin (LIPITOR) tablet 40 mg  40 mg Oral Daily Darvin Santana MD   40 mg at 06/07/24 0852    digoxin (LANOXIN) tablet 125 mcg  125 mcg Oral Daily Laila España PA-C   125 mcg at 06/11/24 0822    [Held by provider] ferrous " sulfate (FEROSUL) tablet 325 mg  325 mg Oral Every Other Day Darvin Santana MD   325 mg at 06/09/24 0750    [Held by provider] furosemide (LASIX) tablet 20 mg  20 mg Oral Daily Darvin Santana MD   20 mg at 06/10/24 0752    gabapentin (NEURONTIN) capsule 100 mg  100 mg Oral TID Darvin Santana MD   100 mg at 06/11/24 0822    HOLD nitroGLYcerin IF   Does not apply HOLD Darvin Santana MD        hydroxychloroquine (PLAQUENIL) half-tab 300 mg  300 mg Oral Daily Justo Stewart MD   300 mg at 06/11/24 0847    hydrOXYzine HCl (ATARAX) tablet 10 mg  10 mg Oral At Bedtime PRN Joseph Majano MD   10 mg at 06/10/24 2006    lidocaine (LMX4) cream   Topical Q1H PRN Darvin Santana MD        lidocaine 1 % 0.1-1 mL  0.1-1 mL Other Q1H PRN Darvin Santana MD        lidocaine 1 %    Once PRN Austin Garcia MD   9 mL at 06/10/24 1043    micafungin (MYCAMINE) 100 mg in sodium chloride 0.9 % 100 mL intermittent infusion  100 mg Intravenous Q24H Laila España PA-C 100 mL/hr at 06/10/24 1142 100 mg at 06/10/24 1142    multivitamin w/minerals (THERA-VIT-M) tablet 1 tablet  1 tablet Oral Daily Justo Stewart MD   1 tablet at 06/11/24 0821    [Held by provider] pantoprazole (PROTONIX) EC tablet 40 mg  40 mg Oral BID AC Darvin Santana MD   40 mg at 06/09/24 0750    Patient is already receiving anticoagulation with heparin, enoxaparin (LOVENOX), warfarin (COUMADIN)  or other anticoagulant medication   Does not apply Continuous PRN Darvin Santana MD        potassium chloride hector ER (KLOR-CON M10) CR tablet 20 mEq  20 mEq Oral Daily Darvin Santana MD   20 mEq at 06/11/24 0821    psyllium (METAMUCIL/KONSYL) Packet 1 packet  1 packet Oral Daily PRN Laila España PA-C        QUEtiapine (SEROquel) half-tab 12.5 mg  12.5 mg Oral At Bedtime PRN Laila España PA-C   12.5 mg at 06/10/24 2052    Reason ACE/ARB/ARNI order not selected   Other DOES NOT GO TO Darvin Smith MD         Reason beta blocker not prescribed   Does not apply DOES NOT GO TO Darvin Smith MD        sodium chloride (PF) 0.9% PF flush 3 mL  3 mL Intracatheter Q8H Darvin Santana MD   3 mL at 06/10/24 1955    sodium chloride (PF) 0.9% PF flush 3 mL  3 mL Intracatheter q1 min prn Darvin Santana MD        sulfamethoxazole-trimethoprim (BACTRIM DS) 800-160 MG per tablet 1 tablet  1 tablet Oral BID Darvin Santana MD   1 tablet at 06/11/24 0821    tamsulosin (FLOMAX) capsule 0.4 mg  0.4 mg Oral Daily Darvin Santana MD   0.4 mg at 06/11/24 0822    warfarin ANTICOAGULANT (COUMADIN) tablet 3 mg  3 mg Oral ONCE at 18:00 Justo Stewart MD        Warfarin Dose Required Daily - Pharmacist Managed  1 each Oral See Admin Instructions Darvin Santana MD           REVIEW OF LABORATORY, PATHOLOGY AND IMAGING RESULTS:  BMP  Recent Labs   Lab 06/11/24  0430 06/10/24  0325 06/09/24  0449 06/08/24  0435    133* 133* 132*   POTASSIUM 4.1 4.0 3.8 4.1   CHLORIDE 106 103 102 103   ELDA 9.3 9.3 9.3 9.3   CO2 18* 17* 16* 17*   BUN 18.7 20.6 23.5* 27.5*   CR 1.35* 1.44* 1.48* 1.25*   GLC 89 97 100* 104*     CBC  Recent Labs   Lab 06/11/24  0430 06/10/24  1100 06/10/24  0325 06/09/24  0449 06/08/24  1049   WBC 3.9*  --  4.1 4.8 4.6   RBC 4.15*  --  4.42 4.44 4.39*   HGB 8.7*   < > 9.3* 9.3* 9.2*   HCT 29.8*  --  31.3* 31.3* 31.0*   MCV 72*  --  71* 71* 71*   MCH 21.0*  --  21.0* 20.9* 21.0*   MCHC 29.2*  --  29.7* 29.7* 29.7*   RDW 23.5*  --  22.7* 22.7* 22.8*     --  205 205 189    < > = values in this interval not displayed.     INR  Recent Labs   Lab 06/11/24  0430 06/10/24  0325 06/09/24  0449 06/08/24  0435   INR 2.14* 1.81* 1.79* 1.73*     LFTs  Recent Labs   Lab 06/11/24  0430 06/10/24  0325 06/09/24  0449 06/08/24  0435   ALKPHOS 220* 237* 241* 236*   * 245* 320* 288*   * 381* 419* 364*   BILITOTAL 0.6 0.7 0.8 0.8   PROTTOTAL 6.8 7.2 7.2 7.1   ALBUMIN 4.1 4.2 4.2 4.2     "    Previous work-up:   Lab Results   Component Value Date    HEPBANG Nonreactive 06/07/2024    HBCAB Nonreactive 05/20/2022    AUSAB 0.00 05/20/2022    HCVAB Nonreactive 06/07/2024    HCVAB Nonreactive 06/07/2024    SHAWN 107 06/07/2024    IRONSAT 14 (L) 06/07/2024    FABI Positive (A) 06/19/2022    ANAT1 1:320 06/19/2022    SMOOTHMUS 5 06/07/2024    A1A 222 (H) 06/19/2022    TSH 1.67 09/18/2022    CHOL 93 09/19/2022    HDL 43 09/19/2022    LDL 25 09/19/2022    TRIG 126 09/19/2022    A1C 5.5 05/20/2022    POPETH <10 06/07/2024    PLPETH <10 06/07/2024      No results found for: \"SPECDES\", \"LDRESULTS\"      Imaging Results:  US abd doppler 6/10/24  Impression:   1.  Normal Doppler examination of the right upper quadrant.   2.  The splenic vein could not be assessed as it was obscured by the  LVAD device.    US abd 6/7/24  IMPRESSION:  1. Mildly increased echogenicity of the hepatic parenchyma relative to  the right renal cortex may be seen in hepatic steatosis.  2. Questionable gallstones in the neck of the gallbladder with biliary  sludge without evidence for acute cholecystitis.  3. The pancreas and common bile duct could not be visualized in this  study.    CT abd wo 6/3/24  IMPRESSION:   1.  Mild pelvic ascites decreased compared to 5/23/2024.   2.  No acute abnormality in the abdomen or pelvis.     Echo 6/7/24  Interpretation Summary  HM3 5100 RPM.     Mild left ventricular dilation is present. LVID:6.1 CM  Left ventricular function is decreased. The ejection fraction is <30%  (severely reduced). Septum midline.  Mild right ventricular dilation is present.  AV is closed. Mild aortic insufficiency is present.  Septum is midline.  Global right ventricular function is mild to moderately reduced.  LV outflow graft cannula flow and velocity is normal.    "

## 2024-06-12 LAB
ALBUMIN SERPL BCG-MCNC: 4.1 G/DL (ref 3.5–5.2)
ALP SERPL-CCNC: 212 U/L (ref 40–150)
ALT SERPL W P-5'-P-CCNC: 296 U/L (ref 0–70)
ANION GAP SERPL CALCULATED.3IONS-SCNC: 12 MMOL/L (ref 7–15)
AST SERPL W P-5'-P-CCNC: 163 U/L (ref 0–45)
BILIRUB DIRECT SERPL-MCNC: 0.32 MG/DL (ref 0–0.3)
BILIRUB SERPL-MCNC: 0.6 MG/DL
BUN SERPL-MCNC: 18.2 MG/DL (ref 8–23)
CALCIUM SERPL-MCNC: 9.6 MG/DL (ref 8.8–10.2)
CHLORIDE SERPL-SCNC: 106 MMOL/L (ref 98–107)
CREAT SERPL-MCNC: 1.2 MG/DL (ref 0.67–1.17)
DEPRECATED HCO3 PLAS-SCNC: 18 MMOL/L (ref 22–29)
EGFRCR SERPLBLD CKD-EPI 2021: 68 ML/MIN/1.73M2
ERYTHROCYTE [DISTWIDTH] IN BLOOD BY AUTOMATED COUNT: 23.8 % (ref 10–15)
GLUCOSE SERPL-MCNC: 91 MG/DL (ref 70–99)
HCT VFR BLD AUTO: 30 % (ref 40–53)
HGB BLD-MCNC: 8.9 G/DL (ref 13.3–17.7)
INR PPP: 2.53 (ref 0.85–1.15)
MAGNESIUM SERPL-MCNC: 1.9 MG/DL (ref 1.7–2.3)
MCH RBC QN AUTO: 21.7 PG (ref 26.5–33)
MCHC RBC AUTO-ENTMCNC: 29.7 G/DL (ref 31.5–36.5)
MCV RBC AUTO: 73 FL (ref 78–100)
PHOSPHATE SERPL-MCNC: 3.4 MG/DL (ref 2.5–4.5)
PLATELET # BLD AUTO: 176 10E3/UL (ref 150–450)
POTASSIUM SERPL-SCNC: 4.4 MMOL/L (ref 3.4–5.3)
PROT SERPL-MCNC: 7 G/DL (ref 6.4–8.3)
RBC # BLD AUTO: 4.1 10E6/UL (ref 4.4–5.9)
SODIUM SERPL-SCNC: 136 MMOL/L (ref 135–145)
WBC # BLD AUTO: 4.2 10E3/UL (ref 4–11)

## 2024-06-12 PROCEDURE — 84100 ASSAY OF PHOSPHORUS: CPT | Performed by: STUDENT IN AN ORGANIZED HEALTH CARE EDUCATION/TRAINING PROGRAM

## 2024-06-12 PROCEDURE — 85610 PROTHROMBIN TIME: CPT

## 2024-06-12 PROCEDURE — 82248 BILIRUBIN DIRECT: CPT | Performed by: PHYSICIAN ASSISTANT

## 2024-06-12 PROCEDURE — 85027 COMPLETE CBC AUTOMATED: CPT | Performed by: STUDENT IN AN ORGANIZED HEALTH CARE EDUCATION/TRAINING PROGRAM

## 2024-06-12 PROCEDURE — 250N000011 HC RX IP 250 OP 636: Mod: JZ | Performed by: PHYSICIAN ASSISTANT

## 2024-06-12 PROCEDURE — 250N000013 HC RX MED GY IP 250 OP 250 PS 637: Performed by: NURSE PRACTITIONER

## 2024-06-12 PROCEDURE — 99418 PROLNG IP/OBS E/M EA 15 MIN: CPT | Mod: 25 | Performed by: NURSE PRACTITIONER

## 2024-06-12 PROCEDURE — 99233 SBSQ HOSP IP/OBS HIGH 50: CPT | Mod: 25 | Performed by: NURSE PRACTITIONER

## 2024-06-12 PROCEDURE — 250N000013 HC RX MED GY IP 250 OP 250 PS 637: Performed by: PHYSICIAN ASSISTANT

## 2024-06-12 PROCEDURE — 84450 TRANSFERASE (AST) (SGOT): CPT | Performed by: STUDENT IN AN ORGANIZED HEALTH CARE EDUCATION/TRAINING PROGRAM

## 2024-06-12 PROCEDURE — 84075 ASSAY ALKALINE PHOSPHATASE: CPT | Performed by: STUDENT IN AN ORGANIZED HEALTH CARE EDUCATION/TRAINING PROGRAM

## 2024-06-12 PROCEDURE — 93750 INTERROGATION VAD IN PERSON: CPT | Performed by: NURSE PRACTITIONER

## 2024-06-12 PROCEDURE — 83735 ASSAY OF MAGNESIUM: CPT | Performed by: STUDENT IN AN ORGANIZED HEALTH CARE EDUCATION/TRAINING PROGRAM

## 2024-06-12 PROCEDURE — 250N000013 HC RX MED GY IP 250 OP 250 PS 637

## 2024-06-12 PROCEDURE — 258N000003 HC RX IP 258 OP 636: Mod: JZ | Performed by: PHYSICIAN ASSISTANT

## 2024-06-12 PROCEDURE — 99233 SBSQ HOSP IP/OBS HIGH 50: CPT | Performed by: CLINICAL NURSE SPECIALIST

## 2024-06-12 PROCEDURE — 120N000003 HC R&B IMCU UMMC

## 2024-06-12 PROCEDURE — 250N000013 HC RX MED GY IP 250 OP 250 PS 637: Performed by: INTERNAL MEDICINE

## 2024-06-12 RX ORDER — MULTIPLE VITAMINS W/ MINERALS TAB 9MG-400MCG
1 TAB ORAL EVERY EVENING
Status: DISCONTINUED | OUTPATIENT
Start: 2024-06-13 | End: 2024-06-14 | Stop reason: HOSPADM

## 2024-06-12 RX ORDER — WARFARIN SODIUM 2 MG/1
2 TABLET ORAL
Status: COMPLETED | OUTPATIENT
Start: 2024-06-12 | End: 2024-06-12

## 2024-06-12 RX ORDER — SULFAMETHOXAZOLE/TRIMETHOPRIM 800-160 MG
1 TABLET ORAL DAILY
Status: DISCONTINUED | OUTPATIENT
Start: 2024-06-12 | End: 2024-06-14 | Stop reason: HOSPADM

## 2024-06-12 RX ORDER — PANTOPRAZOLE SODIUM 40 MG/1
40 TABLET, DELAYED RELEASE ORAL
Status: DISCONTINUED | OUTPATIENT
Start: 2024-06-12 | End: 2024-06-14 | Stop reason: HOSPADM

## 2024-06-12 RX ORDER — DIGOXIN 125 MCG
125 TABLET ORAL EVERY EVENING
Status: DISCONTINUED | OUTPATIENT
Start: 2024-06-13 | End: 2024-06-14 | Stop reason: HOSPADM

## 2024-06-12 RX ORDER — FERROUS SULFATE 325(65) MG
325 TABLET ORAL EVERY OTHER DAY
Status: DISCONTINUED | OUTPATIENT
Start: 2024-06-12 | End: 2024-06-14 | Stop reason: HOSPADM

## 2024-06-12 RX ADMIN — TAMSULOSIN HYDROCHLORIDE 0.4 MG: 0.4 CAPSULE ORAL at 08:46

## 2024-06-12 RX ADMIN — ASPIRIN 81 MG: 81 TABLET ORAL at 08:45

## 2024-06-12 RX ADMIN — Medication 1 TABLET: at 08:46

## 2024-06-12 RX ADMIN — PANTOPRAZOLE SODIUM 40 MG: 40 TABLET, DELAYED RELEASE ORAL at 16:29

## 2024-06-12 RX ADMIN — Medication 300 MG: at 08:46

## 2024-06-12 RX ADMIN — MICAFUNGIN SODIUM 100 MG: 50 INJECTION, POWDER, LYOPHILIZED, FOR SOLUTION INTRAVENOUS at 11:19

## 2024-06-12 RX ADMIN — WARFARIN SODIUM 2 MG: 2 TABLET ORAL at 17:10

## 2024-06-12 RX ADMIN — DIGOXIN 125 MCG: 125 TABLET ORAL at 08:45

## 2024-06-12 RX ADMIN — PANTOPRAZOLE SODIUM 40 MG: 40 TABLET, DELAYED RELEASE ORAL at 09:20

## 2024-06-12 RX ADMIN — SULFAMETHOXAZOLE AND TRIMETHOPRIM 1 TABLET: 800; 160 TABLET ORAL at 08:46

## 2024-06-12 RX ADMIN — FERROUS SULFATE TAB 325 MG (65 MG ELEMENTAL FE) 325 MG: 325 (65 FE) TAB at 09:20

## 2024-06-12 RX ADMIN — GABAPENTIN 100 MG: 100 CAPSULE ORAL at 08:46

## 2024-06-12 RX ADMIN — GABAPENTIN 100 MG: 100 CAPSULE ORAL at 13:59

## 2024-06-12 RX ADMIN — GABAPENTIN 100 MG: 100 CAPSULE ORAL at 19:47

## 2024-06-12 ASSESSMENT — ACTIVITIES OF DAILY LIVING (ADL)
ADLS_ACUITY_SCORE: 46

## 2024-06-12 NOTE — PROGRESS NOTES
Care Management Follow Up    Length of Stay (days): 5    Expected Discharge Date: 06/14/2024     Concerns to be Addressed: adjustment to diagnosis/illness, discharge planning, community resources     Patient plan of care discussed at interdisciplinary rounds: Yes    Anticipated Discharge Disposition: Home     Anticipated Discharge Services:  Home infusion/home care  Anticipated Discharge DME:  None    Education Provided on the Discharge Plan:  Yes  Patient/Family in Agreement with the Plan:  Yes    Referrals Placed by CM/SW:  RNCC working on home infusion  Private pay costs discussed: Not applicable    Additional Information:  Writer received information earlier in the week that there were some concerns with Dandy's ability to manage his VAD due to poor memory/cognition concerns. Participated in family care conference this afternoon in patient's room with Palliative care, [employer] LAURA, VAD coordinator and patient, along with his Son-Amos and Dtr-Asha on the phone.    Discussed discharge planning to home. Inquired into questions/concerns that either the patient or family had with Dandy being discharged home. He was re-evaluated today by the VAD coordinator to assess his independence with the VAD and was able to do everything safely today.    Family reports that son calls patient daily, sometimes 2X/day. He comes over every other day to change the driveline dressing and patient writes out lists of things he needs to do every day. Patient endorses having a poor memory, but according to the patient and family, has been doing okay at home alone with son and Dtr's involvement and supervision.     No concerns with discharging home. Patient is hopeful that his quality of life will be better now that his diarrhea has resolved. He is interested in following all the dietary changes recommended so he can maintain a lactose free diet. He is interested in starting back in outpatient cardiac rehab and will have home infusion for  IV abx at home. He had IV abx 14 months ago at home and was able to manage independently with oversight from his children.    RNCC working on securing home infusion and infusion center, as well as ID follow up in [place]. Son will come and pick the patient up on Friday.    Yas Ennis, JAMARCUS, Dorothea Dix Psychiatric CenterSW  Heart Transplant/MCS   ph. [phone]  Vocera/Teams     Yas Ennis, Dorothea Dix Psychiatric CenterSW

## 2024-06-12 NOTE — PROGRESS NOTES
PALLIATIVE CARE PROGRESS NOTE  [facility]     Patient Name: Dandy Sands  Date of Admission: 6/7/2024   Today the patient was seen for: goals of care     Recommendations & Counseling       GOALS OF CARE:   DNR, OK with pre-arrest intubation, continued rehospitalization.  Would complete POLST prior to discharge  Ongoing goals of care discussions are appropriate, would have him see PC outpatient for this as well as possible symptoms. Appears that PC is available in the [facility] in [facility]. Would send referral there.    ADVANCE CARE PLANNING:  Patient has an advance directive dated 05/11/2022.  Primary Health Care Agent Asha- daughter.  Alternate(s) Amos- son.   There is no POLST form on file, recommend to complete prior to DC.  Code status: No CPR- Pre-arrest intubation OK    MEDICAL MANAGEMENT:   Currently no symptoms  Worried that symptoms could come back with diarrhea. Would continue to monitor this as he is restarting some medications.    PSYCHOSOCIAL/SPIRITUAL:  Appreciate  visit today, can continue to involve as needed.    Palliative Care will continue to follow. Thank you for the consult and allowing us to aid in the care of Dandy Sands.    These recommendations have been discussed with primary team.    YAO Mckeon CNS  [facility], Palliative Care  Securely message with the OpenLabel Web Console (learn more here) or  Text page via [employer] Paging/Directory       Interval History:     Multidisciplinary collaboration:  Notes reviewed, LFTs remaining stably elevated, ID with plan for OP abx infusions.     Notable medications:  Gabapentin 100 mg TID  Protonix 40 mg BID  Hydroxyzine PRN x1  Seroquel PRN x1    Patient/family narrative  Saw today in good spirits, no symptoms.    Met with patient, two children via phone, LVAD SW and LVAD coordinator. Discussed his current medical condition with his chronic driveline infection,  issue with elevated liver enzymes, diarrhea.  Discussed with patient treatment he has had this for his infection and patient was wondering how he possibly got a yeast infection.  Discussed the exact place he got it to be unknown however he is at risk for getting infections with a foreign body going into his abdomen with his driveline.  It seems like patient was wondering how long he would have to be on these antibiotics, and ensure that him and his family understand that these medications are being given for suppressive therapy and not curative intent.  And that he will need these the rest of his life.  Discussed also it being helpful for him along with family to discuss the what-ifs along the way of if he should get much sicker than he is now what he would want.  They have been discussing this already.  It seems like patient does express his wishes fairly openly and from my understanding does not want CPR at this time but they would be accepting of intubation.  He also speaks of times that if his quality becomes so poor considering discontinuation of his LVAD therapies.  He is not in a place right now where he feels like he wants to pursue a more comfort focused pathway.  He remains very hopeful that he can continue to have and work towards a good quality life.  He hopes to continue to drive and be able to go back to playing pool.  Did recommend up POLST with patient and family prior to leaving the hospital.    Assessment          Dandy Sands is a 63 year old male with a past medical history of ICM s/p HM3 in 2022, CAD s/p PCI in 2005, in stent restenosis with TISHA to mLAD, and prox PCA in 05/2022, HLD, SLE, antiphosholipid syndrome, chronic anemia, anxiety who presented on 5/31 with weakness, malaise, persistent diarrhea to [place] and transferred to [facility] for further cares given his chronic driveline infection.      While hospitalized its been difficult to continue to treat his infection due to liver lab  abnormalities.     Today, the patient was seen for:  [facility] s/p HM3  Chronic driveline infection  Severe diarrhea  Malnutrition  Anemia  Anxiety  Insomnia        Review of Systems:     Besides above, ROS was reviewed and is unremarkable        Physical Exam:   Temp:  [97.1  F (36.2  C)-98.2  F (36.8  C)] 97.1  F (36.2  C)  Pulse:  [67-93] 82  Resp:  [16] 16  SpO2:  [99 %-100 %] 100 %  144 lbs 0 oz    Constitutional: Awake, alert, cooperative, frail and cachectic, no apparent distress  Lungs: No increased work of breathing, good air exchange  Cardiovascular: HM3  Neurologic: Awake, alert, oriented to name, place and time.    Skin: No rashes, erythema            Data Reviewed:     Results for orders placed or performed during the hospital encounter of 06/07/24 (from the past 24 hour(s))   Heparin Unfractionated Anti Xa Level   Result Value Ref Range    Anti Xa Unfractionated Heparin 0.14 For Reference Range, See Comment IU/mL    Narrative    Therapeutic Range: UFH: 0.25-0.50 IU/mL for low intensity dosing,  0.30-0.70 IU/mL for high intensity dosing DVT and PE.  This test is not validated for other direct factor X inhibitors (e.g. rivaroxaban, apixaban, edoxaban, betrixaban, fondaparinux) and should not be used for monitoring of other medications.   Comprehensive metabolic panel   Result Value Ref Range    Sodium 136 135 - 145 mmol/L    Potassium 4.4 3.4 - 5.3 mmol/L    Carbon Dioxide (CO2) 18 (L) 22 - 29 mmol/L    Anion Gap 12 7 - 15 mmol/L    Urea Nitrogen 18.2 8.0 - 23.0 mg/dL    Creatinine 1.20 (H) 0.67 - 1.17 mg/dL    GFR Estimate 68 >60 mL/min/1.73m2    Calcium 9.6 8.8 - 10.2 mg/dL    Chloride 106 98 - 107 mmol/L    Glucose 91 70 - 99 mg/dL    Alkaline Phosphatase 212 (H) 40 - 150 U/L     (H) 0 - 45 U/L     (H) 0 - 70 U/L    Protein Total 7.0 6.4 - 8.3 g/dL    Albumin 4.1 3.5 - 5.2 g/dL    Bilirubin Total 0.6 <=1.2 mg/dL   Magnesium   Result Value Ref Range    Magnesium 1.9 1.7 - 2.3 mg/dL   Phosphorus    Result Value Ref Range    Phosphorus 3.4 2.5 - 4.5 mg/dL   Bilirubin direct   Result Value Ref Range    Bilirubin Direct 0.32 (H) 0.00 - 0.30 mg/dL   INR   Result Value Ref Range    INR 2.53 (H) 0.85 - 1.15   CBC with platelets   Result Value Ref Range    WBC Count 4.2 4.0 - 11.0 10e3/uL    RBC Count 4.10 (L) 4.40 - 5.90 10e6/uL    Hemoglobin 8.9 (L) 13.3 - 17.7 g/dL    Hematocrit 30.0 (L) 40.0 - 53.0 %    MCV 73 (L) 78 - 100 fL    MCH 21.7 (L) 26.5 - 33.0 pg    MCHC 29.7 (L) 31.5 - 36.5 g/dL    RDW 23.8 (H) 10.0 - 15.0 %    Platelet Count 176 150 - 450 10e3/uL     *Note: Due to a large number of results and/or encounters for the requested time period, some results have not been displayed. A complete set of results can be found in Results Review.     Recent Labs   Lab 06/12/24  0608 06/12/24  0352 06/11/24  0430 06/10/24  1100 06/10/24  0325   WBC 4.2  --  3.9*  --  4.1   HGB 8.9*  --  8.7* 9.5* 9.3*   MCV 73*  --  72*  --  71*     --  177  --  205   INR 2.53*  --  2.14*  --  1.81*   NA  --  136 136  --  133*   POTASSIUM  --  4.4 4.1  --  4.0   CHLORIDE  --  106 106  --  103   CO2  --  18* 18*  --  17*   BUN  --  18.2 18.7  --  20.6   CR  --  1.20* 1.35*  --  1.44*   ANIONGAP  --  12 12  --  13   ELDA  --  9.6 9.3  --  9.3   GLC  --  91 89  --  97   ALBUMIN  --  4.1 4.1  --  4.2   PROTTOTAL  --  7.0 6.8  --  7.2   BILITOTAL  --  0.6 0.6  --  0.7   ALKPHOS  --  212* 220*  --  237*   ALT  --  296* 335*  --  381*   AST  --  163* 199*  --  245*       No results found for this or any previous visit (from the past 24 hour(s)).      Medical Decision Making       MANAGEMENT DISCUSSED with the following over the past 24 hours: cardiology, LVAD SW, LVAD coordinator, nursing   NOTE(S)/MEDICAL RECORDS REVIEWED over the past 24 hours: cardiology, LVAD coordinator, nursing, PT.  Tests REVIEWED in the past 24 hours:  - See lab/imaging results included in the data section of the note  SUPPLEMENTAL HISTORY, in addition to  the patient's history, over the past 24 hours obtained from:   - two children  Medical complexity over the past 24 hours:  - Decision to DE-ESCALATE CARE based on prognosis

## 2024-06-12 NOTE — PROCEDURES
The patient's HeartMate LVAD was interrogated 6/12/2024  * Speed 5100 rpm   * Pulsatility index 4.8  * Power 3.5 [place]   * Flow 3.3 L/minute   Fluid status: euvolemic   Alarms were reviewed, and notable for PI events.   The driveline exit site was inspected, dressing cdi.   All external components were inspected and showed no evidence of damage or malfunction, none replaced.   No changes to VAD settings made

## 2024-06-12 NOTE — PROGRESS NOTES
"Care Coordinator  D/I: Per team rounds: Chantal Sharpe. NP with cards 2--pt is to discharge to home on Friday, 6/14 morning on IV micafungin(length of therapy to be determined by [facility]: Dr Luis Saravia and will need to be seen by him in 1-2 weeks.  P: I verified that pt has a L Midline catheter placed on 6/6/24 by [facility] prior to transfer to [facility], and Naye says he can keep it also [facility] says he can keep it.  Pt said he had a PICC 3/2023 and his skin could NOT handle the large tegaderm dressing and his skin broke down and he has a scar on his R UE from it. Pt prefers to keep the midline and has a small teaderm for sensitive skin on--cap change was due 6/11 and dressing change is due 6/1, then 6/20 as outpatient.  +++Pt needs an extension placed to his Midline prior to discharge. I put this on RN elyssa notes++++    I called [facility] Pharmacist: Audrey @ 11:56am  Ph: [phone] Fax: [phone] and she requests the micafungin script/H&P and MD notes from today all fax'd @ 12:54pm. Pt is NOT homebound(if he was then I have to arrange for HHRN) Pt wants to:  medication/supplies/short refresher teach session(they will dispense the \"home pump/elastomeric ball\" on Friday, 6/14/24 afternoon  from [facility] Pharmacist  [address] [place] [id number]    Pt wishes to go to : [facility]   [address] [place] [address]  Ph: [phone] Fax:_____for weekly Midline dressing change due 6/20 and weekly labs due 6/20.  I called and talked with Paula and she asked me to ask [facility] ID Dr Saravia if he can put orders in their system for the weekly Midline dressing change due 6/20 and weekly labs due 6/20, etc ___  Paula needs to know if he can't____.    I called [facility] @ 12:19pm  Ph: [phone] Fax: [phone] Rosaline--sent a message to the ID RN to " get pt scheduled in 1-2 weeks ____and also I asked: [facility] ID Dr Saravia if he can put orders in their system for the weekly Midline dressing change due 6/20 and weekly labs due 6/20, etc ___they are to call me back____.  Wait for call back_____.

## 2024-06-12 NOTE — PROGRESS NOTES
"[id number]    BP 90/76 (BP Location: Right arm)   Pulse 77   Temp 97.8  F (36.6  C) (Oral)   Resp 17   Ht 1.702 m (5' 7\")   Wt 65.3 kg (144 lb)   SpO2 100%   BMI 22.55 kg/m       Neuro: A&Ox4.   Cardiac: Afebrile, A-Fib w/ Multiform PVCs    Respiratory: RA   GI/: Voiding spontaneously. 1 BM this shift.   Diet/appetite: Tolerating Lactose free diet w/ 2L Fluid Restriction. Denies nausea   Activity: Up independently or SBA with walker  Pain: Denies   Skin: Driveline Incision, [place] spot irritated.  Lines: Midline Single Lumen SL, R AC PIV SL  Drains: None  Replacement: None    Discharge home on 6/14 with IV Micafungin. Pt to discharge with midline, which will be managed by [facility].    Palliative consulted, spiritual care came by to visit the patient. VAD co-ordinator held VAD teaching with Patient.   "

## 2024-06-12 NOTE — CONSULTS
"SPIRITUAL HEALTH SERVICES Consult Note  [facility] ([place] 6C    Referral Source/Reason for Visit: Routine consult    Summary and Recommendations -  ~Dandy was upbeat when I visited with him, as the hopes of going home looked positive. He knows that his time is limited and readily believes \"God will take me when he is ready.\" Also, Dandy is hopeful to live long enough to see his second grandchild.     ~Dandy is [employer] - [employer] and finds attending [employer] to be uplifting for his soul. We shared a prayer together.    Plan:  support remain available upon request. I will follow up as his stay extends.    Rev. CATRACHITO Hathaway.  Chaplain Resident  Pager [phone]    * [facility] remains available 24/7 for emergent requests/referrals, either by having the switchboard page the on-call  or by entering an ASAP/STAT consult in Epic (this will also page the on-call ). Routine Epic consults receive an initial response within 24 hours.*      "

## 2024-06-12 NOTE — PLAN OF CARE
Neuro: A/Ox4.  Cardiac: Afib/flutter with HR 70's, occasional v paced. BBB. VSS.   Respiratory: O2 sats stable on RA  GI/: Voiding in urinal, no BM overnight  Diet/appetite: No lactose diet, 2L FR  Activity: Up with assist one  Pain: Denies pain  Skin: Red irritation around LVAD dressing R side. Pt refusing anchor.   LDA's: LVAD driveline CDI, Midline L, PIV R    Plan: Care conference at noon today. Continue with POC. Notify primary team with changes.

## 2024-06-12 NOTE — PROGRESS NOTES
[facility]   Cardiology II Service / Advanced Heart Failure  Daily Progress Note      Patient: Dandy Sands  MRN: [id number]  Admission Date: 6/7/2024  Hospital Day # 5    Assessment and Plan: Dandy Sands is a 63 year old male admitted on 6/7/2024 as a transfer from [place] where he was most recently admitted 5/31/2024 for weakness, malaise, and persistent diarrhea in setting of chronic driveline infection now transferred to [facility] for ongoing ZAKI cares. He has a past medical history significant for ICM s/p HM3 placement 7/8/2022 at [facility], CAD s/p PCI to LAD 2005, in-stent restenosis with TISHA to mLAD and prox RCA 05/24/2022, HLD, c. Diff 9/2022, SLE, antiphospholipid syndrome, chronic anemia, anxiety, and other conditions.     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 At that time they were opting for ongoing treatment with dalbavancin, micafungin, and bactrim, although they were less certain that candida was active contributor vs colonizer. 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 Per review of previous 2023 discharge summary also had 1+ corynebacterium striatum from OR wound cultures at that time.  Per OSH radiology read 06/03/2024 of CT abdomen/pelvis w/o contrast, no acute abnormalities but with mild ascites.  BC 05/31/2024: negative at [place].  - Re-engaging ID for plan for yeast treatment given ongoing LFT dysfunction, would prefer to not use fluconazole at this time  - Please see H&P bottom/data section for copied  positive [place] culture results dating back through 2022  - Now willing to continue Dalbavancin as this does not correlate with his diarrhea (initially was declining this as he attributed his diarrhea to this)  - Continue micafungin 100 mg IV Q24H until [place] follow-up  -  will need to assess his ability to do IV medication at home  - Continue dalbavancin q2wks (due 6/14)  - if he remains in the hospital will need to coordinate with pharmacy to see if we can get an exception to give this in the hospital  - Bactrim 800-160 BID through 06/11 - switch to daily today per ID    Somewhat unclear exactly what regimen he has been on over last hospital stay and other recent stay, although not a comprehensive list brief review reveals the following:   Vancomycin (05/23-05/24)  Micafungin (05/24-05/27, 06/02-**)  Fluconazole (05/27-06/01)  Bactrim (05/31-**)  Cefepime (5/23 - 5/27)  Dalbavancin (06/2023-present)    Ciprofloxacain (leading up to 05/23 admission was on 250 mg PO PTA for suppression, unclear duration)      #s/p HM3 LVAD 07/08/2022 due to ICM  #s/p CRT-D (2006) s/p RV lead upgrade 1/13/23   #s/p LVAD 7/8/12 (Likely medtronic device remains from 03/2006 placement although not certain)   #CAD s/p PCI to LAD 2005, insent restenosis and TISHA mLAD, Prox RCA 05/2022    6/10/24 RHC RA 7, PA 19/9/12, PCW 1, AZUL CO/CI 2.96/1.7, PVR 4.06, SVR 1600    Speed: 5100, discussed decreasing d/t elevated RA and very low pcw, but deferring in favor of digoxin for now, may need to consider in the future pending clinic al course  RV support: digoxin 125 mcg daily, check level ~6/15  ACEi/ARB/ARNi: historically was on lisinopril and hydralazine stopped due to dizziness and hypotension  BB: historical documentation reflects post-operative RV failure, hypotension, and low flows   SGLT2i: none currently, previous documentation reflects concern for immunosuppression and concern for infections/unclear evidence in LVAD patients  MRA: none,  "reportedly due to low maps  Anticoagulation: warfarin INR goal 2-3, INR 2.5 today  ASA: not indicated  Volume status: hypovoemic by RHC, lasix stopped 6/10    Other: Discussed with patient and daughter that he will need to come back to [place] for VD folllow-up at least twice per year per policy. Will try to arrange with Dr. Stewart in [place] during outreach clinics for inbetween visits, Dr. Lora will remain primary cardiologist. Dispo pending.      # Abnormal LFTs, improving  AST/ALT 100s to 300s since at least 06/01 at [place], on admission /. Previously thought related to congestive hepatopathy, however RA ~5. Bilirubin WNL, alk phos 237. RUQ ultrasound with mildly increased echogenicity of the hepatic parenchyma with possible hepatic steatosis. Also considering that this could be drug induced.  - hepatology consulted   - \"cause likely multifactorial with cholestasis of chronic illness with driveline infection, antibiotics causing transient increase/DILI, recent fluid volume overload in setting of HM3 and ICM contributing to hepatic congestion\"  - Trend LFTs daily  - HOLDING lipitor  - resume protonix and po iron  - smooth muscle antibody normal  - PeTH negative    # Diarrhea, chronic, improved  History frequent diarrhea since being on antibiotics, which per his report he has been on nearly since time of placement. His most significant concern and what he finds most puzzling is that it typically goes away in the hospital and returns once home. He believes it may be due to dalbavancin although he did get a dose 05/31 and has not had diarrhea in some time so unclear. Diet is high in lactose and greasy food . No diarrhea since 5/31.  - GI consulted, luminal signed off  - Enteric panel and C. Diff negative 5/23/24 at [facility]  - +norovirus, but having formed stools, so not correlating clinically, continue enteric precautions  - Pending fecal calprotectin- per GI, expect this to be elevated given " "positive norovirus  - H. Pylori stool antigen negative  - STRICT no lactose diet  - RD consult for further education  - send home with stool diary template     # C. Diff  Two admission for C. Diff 09/2022, negative 5/23/2024. No current diarrhea  - monitor    # Hemorrhoids  Patient interested in having these surgically fixed if possible as he states the surgeons at [place] will not do an operation to fix them because he has a LVAD  - consider outpatient surgical consult pending clinical course      # HLD  - holding atorvastatin 40 mg PO daily as above    # SLE, antiphospholipid syndrome  - PTA hydroxychloroquine   - A/C as above     # Chronic BRENDA   Baseline Hgb 8-9, Hgb within baseline.   - LDH elevated but has VAD so not significant  - s/p Infed on 6/10  -  pcp or anemica clinic in [place] on discharge as he would benefit from intermittent IV iron  - resume PO iron      #Goals of care  Patient explicitly does not want chest compressions or to be resuscitated for cardiac arrest, although would be ok with pre-arrest intubation. Prior provider note \"We discussed at some length how this code status creates a unique situation for him given presence of LVAD and how this would preclude chest compressions and that his ICD would likely shock him out of arrhythmias should they arise. He is accepting of this at the present time and understands that he may get a shock from his ICD. Therefore the most reasonable code status that closely matches his wishes seems to be DNR with an ok for pre-arrest intubation only. \"  - palliative care following  - DNR, ok for pre-arrest intubation  - Understands and accepts he may get shocks from his ICD  - Health psychology consulted    # JOSE, prerenal  - Holding lasix as above   - Digoxin as above  - Daily BMP    Diet: Fluid restriction 2000 ML FLUID  2 Gram Sodium Diet    lactose free diet  DVT Prophylaxis: Warfarin  Fitzgerald Catheter: Not present  Cardiac Monitoring: None  Code Status:  DNR, ok for " "pre-arrest intubation    Naye Sharpe DNP, NP-C  Nurse Practitioner - Advanced Heart Failure/Cardiology II Service  Jon[place] preferred or Pager [phone]    Patient discussed with Dr. Stewart      70 minutes spent on the date of the encounter doing chart review, history and exam, documentation and further activities per the note    ================================================================    Subjective/24-Hr Events:   Last 24 hr care team notes reviewed. He is feeling ok. Slept better. No diarrhea. Questions answered.     ROS:  4 point ROS including respiratory, CV, GI and  (other than that noted in the HPI) is negative.     Medications: Reviewed in EPIC.     Physical Exam:   BP 90/76 (BP Location: Right arm)   Pulse 82   Temp 97.1  F (36.2  C) (Oral)   Resp 16   Ht 1.702 m (5' 7\")   Wt 65.3 kg (144 lb)   SpO2 100%   BMI 22.55 kg/m      GENERAL: Appears comfortable, in no distress .  HEENT: Eye symmetrical, no discharge or icterus bilaterally.   NECK: Supple, JVD <6.   CV: home of lvad, no adventitious lung sounds  RESPIRATORY: Respirations regular, even, and unlabored. Lungs CTA throughout.    GI: Soft and non distended   EXTREMITIES: No peripheral edema.   NEUROLOGIC: Alert and interacting appropriatly.  MUSCULOSKELETAL: No joint swelling or tenderness.   SKIN: No jaundice.     Labs:  CMP  Recent Labs   Lab 06/12/24  0352 06/11/24  0430 06/10/24  0325 06/09/24  0449    136 133* 133*   POTASSIUM 4.4 4.1 4.0 3.8   CHLORIDE 106 106 103 102   CO2 18* 18* 17* 16*   ANIONGAP 12 12 13 15   GLC 91 89 97 100*   BUN 18.2 18.7 20.6 23.5*   CR 1.20* 1.35* 1.44* 1.48*   GFRESTIMATED 68 59* 55* 53*   ELDA 9.6 9.3 9.3 9.3   MAG 1.9 1.9 1.8 1.8   PHOS 3.4 3.7 3.4 3.9   PROTTOTAL 7.0 6.8 7.2 7.2   ALBUMIN 4.1 4.1 4.2 4.2   BILITOTAL 0.6 0.6 0.7 0.8   ALKPHOS 212* 220* 237* 241*   * 199* 245* 320*   * 335* 381* 419*       CBC  Recent Labs   Lab 06/12/24  0608 06/11/24  0430 06/10/24  1100 " 06/10/24  0325 06/09/24  0449   WBC 4.2 3.9*  --  4.1 4.8   RBC 4.10* 4.15*  --  4.42 4.44   HGB 8.9* 8.7* 9.5* 9.3* 9.3*   HCT 30.0* 29.8*  --  31.3* 31.3*   MCV 73* 72*  --  71* 71*   MCH 21.7* 21.0*  --  21.0* 20.9*   MCHC 29.7* 29.2*  --  29.7* 29.7*   RDW 23.8* 23.5*  --  22.7* 22.7*    177  --  205 205       INR  Recent Labs   Lab 06/12/24  0608 06/11/24  0430 06/10/24  0325 06/09/24  0449   INR 2.53* 2.14* 1.81* 1.79*

## 2024-06-13 LAB
ALBUMIN SERPL BCG-MCNC: 4.1 G/DL (ref 3.5–5.2)
ALP SERPL-CCNC: 209 U/L (ref 40–150)
ALT SERPL W P-5'-P-CCNC: 268 U/L (ref 0–70)
ANION GAP SERPL CALCULATED.3IONS-SCNC: 10 MMOL/L (ref 7–15)
AST SERPL W P-5'-P-CCNC: 138 U/L (ref 0–45)
BILIRUB DIRECT SERPL-MCNC: 0.36 MG/DL (ref 0–0.3)
BILIRUB SERPL-MCNC: 0.6 MG/DL
BUN SERPL-MCNC: 16.6 MG/DL (ref 8–23)
CALCIUM SERPL-MCNC: 9.3 MG/DL (ref 8.8–10.2)
CHLORIDE SERPL-SCNC: 108 MMOL/L (ref 98–107)
CREAT SERPL-MCNC: 1.17 MG/DL (ref 0.67–1.17)
DEPRECATED HCO3 PLAS-SCNC: 18 MMOL/L (ref 22–29)
EGFRCR SERPLBLD CKD-EPI 2021: 70 ML/MIN/1.73M2
ERYTHROCYTE [DISTWIDTH] IN BLOOD BY AUTOMATED COUNT: 24.4 % (ref 10–15)
GLUCOSE SERPL-MCNC: 163 MG/DL (ref 70–99)
HCT VFR BLD AUTO: 30 % (ref 40–53)
HGB BLD-MCNC: 8.9 G/DL (ref 13.3–17.7)
INR PPP: 2.16 (ref 0.85–1.15)
MAGNESIUM SERPL-MCNC: 1.9 MG/DL (ref 1.7–2.3)
MCH RBC QN AUTO: 21.7 PG (ref 26.5–33)
MCHC RBC AUTO-ENTMCNC: 29.7 G/DL (ref 31.5–36.5)
MCV RBC AUTO: 73 FL (ref 78–100)
PHOSPHATE SERPL-MCNC: 3.7 MG/DL (ref 2.5–4.5)
PLATELET # BLD AUTO: 169 10E3/UL (ref 150–450)
POTASSIUM SERPL-SCNC: 4 MMOL/L (ref 3.4–5.3)
PROT SERPL-MCNC: 6.9 G/DL (ref 6.4–8.3)
RBC # BLD AUTO: 4.1 10E6/UL (ref 4.4–5.9)
SODIUM SERPL-SCNC: 136 MMOL/L (ref 135–145)
WBC # BLD AUTO: 4.2 10E3/UL (ref 4–11)

## 2024-06-13 PROCEDURE — 250N000013 HC RX MED GY IP 250 OP 250 PS 637: Performed by: INTERNAL MEDICINE

## 2024-06-13 PROCEDURE — 999N000044 HC STATISTIC CVC DRESSING CHANGE

## 2024-06-13 PROCEDURE — 85027 COMPLETE CBC AUTOMATED: CPT | Performed by: STUDENT IN AN ORGANIZED HEALTH CARE EDUCATION/TRAINING PROGRAM

## 2024-06-13 PROCEDURE — 120N000003 HC R&B IMCU UMMC

## 2024-06-13 PROCEDURE — 84100 ASSAY OF PHOSPHORUS: CPT | Performed by: STUDENT IN AN ORGANIZED HEALTH CARE EDUCATION/TRAINING PROGRAM

## 2024-06-13 PROCEDURE — 82248 BILIRUBIN DIRECT: CPT | Performed by: PHYSICIAN ASSISTANT

## 2024-06-13 PROCEDURE — 250N000011 HC RX IP 250 OP 636: Mod: JZ | Performed by: PHYSICIAN ASSISTANT

## 2024-06-13 PROCEDURE — 250N000013 HC RX MED GY IP 250 OP 250 PS 637: Performed by: STUDENT IN AN ORGANIZED HEALTH CARE EDUCATION/TRAINING PROGRAM

## 2024-06-13 PROCEDURE — 258N000003 HC RX IP 258 OP 636: Mod: JZ | Performed by: PHYSICIAN ASSISTANT

## 2024-06-13 PROCEDURE — 93750 INTERROGATION VAD IN PERSON: CPT | Performed by: NURSE PRACTITIONER

## 2024-06-13 PROCEDURE — 85610 PROTHROMBIN TIME: CPT

## 2024-06-13 PROCEDURE — 250N000013 HC RX MED GY IP 250 OP 250 PS 637: Performed by: NURSE PRACTITIONER

## 2024-06-13 PROCEDURE — 250N000013 HC RX MED GY IP 250 OP 250 PS 637

## 2024-06-13 PROCEDURE — 250N000011 HC RX IP 250 OP 636: Mod: JZ | Performed by: NURSE PRACTITIONER

## 2024-06-13 PROCEDURE — 258N000003 HC RX IP 258 OP 636: Mod: JZ | Performed by: NURSE PRACTITIONER

## 2024-06-13 PROCEDURE — 99233 SBSQ HOSP IP/OBS HIGH 50: CPT | Performed by: CLINICAL NURSE SPECIALIST

## 2024-06-13 PROCEDURE — 82040 ASSAY OF SERUM ALBUMIN: CPT | Performed by: STUDENT IN AN ORGANIZED HEALTH CARE EDUCATION/TRAINING PROGRAM

## 2024-06-13 PROCEDURE — 83735 ASSAY OF MAGNESIUM: CPT | Performed by: STUDENT IN AN ORGANIZED HEALTH CARE EDUCATION/TRAINING PROGRAM

## 2024-06-13 PROCEDURE — 99232 SBSQ HOSP IP/OBS MODERATE 35: CPT | Mod: 25 | Performed by: NURSE PRACTITIONER

## 2024-06-13 RX ORDER — DEXTROSE MONOHYDRATE 50 MG/ML
10-20 INJECTION, SOLUTION INTRAVENOUS EVERY 8 HOURS
Status: DISCONTINUED | OUTPATIENT
Start: 2024-06-13 | End: 2024-06-14 | Stop reason: HOSPADM

## 2024-06-13 RX ORDER — WARFARIN SODIUM 2 MG/1
TABLET ORAL
Qty: 90 TABLET | Refills: 3 | Status: ACTIVE | OUTPATIENT
Start: 2024-06-13

## 2024-06-13 RX ORDER — TAMSULOSIN HYDROCHLORIDE 0.4 MG/1
0.4 CAPSULE ORAL DAILY
Qty: 30 CAPSULE | Refills: 0 | Status: SHIPPED | OUTPATIENT
Start: 2024-06-13

## 2024-06-13 RX ORDER — SULFAMETHOXAZOLE/TRIMETHOPRIM 800-160 MG
1 TABLET ORAL DAILY
Qty: 90 TABLET | Refills: 3 | Status: ACTIVE | OUTPATIENT
Start: 2024-06-13 | End: 2024-10-02

## 2024-06-13 RX ORDER — WARFARIN SODIUM 3 MG/1
3 TABLET ORAL
Status: COMPLETED | OUTPATIENT
Start: 2024-06-13 | End: 2024-06-13

## 2024-06-13 RX ORDER — HYDROXYCHLOROQUINE SULFATE 300 MG/1
300 TABLET ORAL DAILY
Qty: 30 TABLET | Refills: 3 | Status: ACTIVE | OUTPATIENT
Start: 2024-06-13

## 2024-06-13 RX ORDER — DIGOXIN 125 MCG
125 TABLET ORAL EVERY EVENING
Qty: 90 TABLET | Refills: 3 | Status: SHIPPED | OUTPATIENT
Start: 2024-06-13

## 2024-06-13 RX ORDER — FUROSEMIDE 20 MG
20 TABLET ORAL DAILY PRN
Qty: 30 TABLET | Refills: 3 | Status: SHIPPED | OUTPATIENT
Start: 2024-06-13

## 2024-06-13 RX ADMIN — PANTOPRAZOLE SODIUM 40 MG: 40 TABLET, DELAYED RELEASE ORAL at 15:50

## 2024-06-13 RX ADMIN — Medication 1 TABLET: at 20:51

## 2024-06-13 RX ADMIN — Medication 300 MG: at 08:30

## 2024-06-13 RX ADMIN — SULFAMETHOXAZOLE AND TRIMETHOPRIM 1 TABLET: 800; 160 TABLET ORAL at 08:30

## 2024-06-13 RX ADMIN — GABAPENTIN 100 MG: 100 CAPSULE ORAL at 08:30

## 2024-06-13 RX ADMIN — PANTOPRAZOLE SODIUM 40 MG: 40 TABLET, DELAYED RELEASE ORAL at 08:30

## 2024-06-13 RX ADMIN — DALBAVANCIN 1500 MG: 500 INJECTION, POWDER, FOR SOLUTION INTRAVENOUS at 20:59

## 2024-06-13 RX ADMIN — WARFARIN SODIUM 3 MG: 3 TABLET ORAL at 17:12

## 2024-06-13 RX ADMIN — ASPIRIN 81 MG: 81 TABLET ORAL at 08:30

## 2024-06-13 RX ADMIN — DIGOXIN 125 MCG: 125 TABLET ORAL at 20:51

## 2024-06-13 RX ADMIN — GABAPENTIN 100 MG: 100 CAPSULE ORAL at 15:50

## 2024-06-13 RX ADMIN — MICAFUNGIN SODIUM 100 MG: 50 INJECTION, POWDER, LYOPHILIZED, FOR SOLUTION INTRAVENOUS at 09:45

## 2024-06-13 RX ADMIN — HYDROXYZINE HYDROCHLORIDE 10 MG: 10 TABLET ORAL at 22:12

## 2024-06-13 RX ADMIN — GABAPENTIN 100 MG: 100 CAPSULE ORAL at 20:51

## 2024-06-13 RX ADMIN — TAMSULOSIN HYDROCHLORIDE 0.4 MG: 0.4 CAPSULE ORAL at 08:30

## 2024-06-13 RX ADMIN — DEXTROSE MONOHYDRATE 20 ML: 50 INJECTION, SOLUTION INTRAVENOUS at 20:51

## 2024-06-13 RX ADMIN — DEXTROSE MONOHYDRATE 20 ML: 50 INJECTION, SOLUTION INTRAVENOUS at 22:01

## 2024-06-13 ASSESSMENT — ACTIVITIES OF DAILY LIVING (ADL)
ADLS_ACUITY_SCORE: 46

## 2024-06-13 NOTE — PROGRESS NOTES
"Care Management Follow Up    Length of Stay (days): 6    Expected Discharge Date: 06/14/2024  Son will be here approx 10:30am tomorrow morning.     Concerns to be Addressed: adjustment to diagnosis/illness     Patient plan of care discussed at interdisciplinary rounds: Yes with Chantal Sharpe, SAJAN cards 2    Anticipated Discharge Disposition: Home     Anticipated Discharge Services:  IVAB with [facility]--pt to self administer at home and will go to [facility] for weekly Midline dressing changes(with sensitive tegaderm) and lab draws. Dr Manjit Lazo ID to place OP Therapy Plan within [facility] system for this, per Audrey at [facility](she will message him today).  Midline: dressing changed today. Pt aware he needs it changed on 6/20--he prefers Fridays 6/21 and he will call them \"\"they know me well\".     [facility] @ 12:19pm  Ph: [phone] Fax: [phone] Rosaline--sent a message to the ID RN on 6/12 to get pt scheduled in 1-2 weeks ______      Anticipated Discharge DME:  Pt will need to go to the: [facility]  [address] [place] [id number]  Ph: [phone]  BY 5pm Friday, 6/14/24 to  his supplies  He is aware of this and he is to call them when he leaves [place] and he will.  IF he picks up later than 5:30pm then they send it over to [facility] (pt aware as has done this before).    Patient/family educated on Medicare website which has current facility and service quality ratings:    Education Provided on the Discharge Plan:    Patient/Family in Agreement with the Plan:      Referrals Placed by CM/SW:    Private pay costs discussed: insurance costs Per [employer]--pt has coverage for the drug and may have copays. Pt aware.    Additional Information:      Leann Falcon RN     "

## 2024-06-13 NOTE — PROGRESS NOTES
NURSING PROGRESS NOTE  Shift Summary      Date: June 13, 2024     Neuro/Musculoskeletal:  A&Ox4.   Cardiac:  SR with PVC's  VSS.     Respiratory:  Sating in the 90s on RA.  GI/:  Adequate urine output.  LBM: Yesterday  Diet/Appetite:  Tolerating Lactose free diet 2 L FR  Activity: Independent with walker   Pain:  Denies.   Skin:  No new deficits noted. LVAD driveline site dressing q 2 days/ dry  LDAs + Drips/IVF:  Midline for blood draws on left upper arm/ PIV on R arm. LVAD  Protocols/Labs:  per orders    Pertinent Shift Updates:  Uneventful night with no acute changes. No LVAD alarms.       Plan:  Discharge to home on Friday      Roni Lamb RN  .................................................... June 13, 2024   6:29 AM  [facility] ([facility]): [facility] ICU (Unit 6D)

## 2024-06-13 NOTE — PROCEDURES
The patient's HeartMate LVAD was interrogated 6/13/2024  * Speed 5100 rpm   * Pulsatility index 6.1  * Power 5.5 [employer]   * Flow 3.9 L/minute   Fluid status: euvolemic   Alarms were reviewed, and notable for some PI events overnight.   The driveline exit site was inspected, dressing cdi.   All external components were inspected and showed no evidence of damage or malfunction, none replaced.   No changes to VAD settings made

## 2024-06-13 NOTE — PHARMACY
[facility]  Parenteral ANtibiotic Review at Departure from Acute Care Collaborative Note     Patient: Dandy Sands  MRN: [id number]  Allergies: Patient has no known allergies.    Current Location:  6C  OPAT to be provided by: Other    [facility] (dalbavancin) and [facility] (micafungin)  Line Type: Midline    Diagnosis/Indications: Chronic polymicrobial LVAD driveline infection  Organism(s): History of Serratia marcescens, E.coli, VRE, Corynebacterium striatum, Candida parapsilosis  MRDO? Yes - other  Pending Cultures/Microbiological Tests: no      Inpatient ID involved in developing OPAT plan: Yes - discharge OPAT plan has no changes from ID provider, Dr. Gwendoyln Barrett PA-C, OPAT plan charted on 6/10/2024    Outpatient ID Follow-up: Referred to outside ID provider for follow-up  Designated Provider: [facility] -  Dr. Saravia    Antimicrobial Regimen / Route Anticipated  Duration Start Date Stop /  Reassess Date    (Dalbavancin) 1.5 g  (every two weeks)/IV TBD Prior to admission Definitive end date to be determined by ID provider   Micafungin 100 mg every 24 hours/IV TBD Prior to admission Definitive end date to be determined by ID provider   Trimethoprim + Sulfamethoxazole 1 DS tab every 24 hours/PO TBD 6/12/2024 (transitioned from treatment to suppresive regimen) Definitive end date to be determined by ID provider     Laboratory Tests and Monitoring Frequency: Other (outpatient laboratory monitoring for IV antimicrobial therapy per [facility] ID team)      Imaging/Miscellaneous Monitoring: None    ID Pharmacist Interventions: None                          Natalie Mcleod, PharmD, BCIDP  Pager: [phone]

## 2024-06-13 NOTE — PROGRESS NOTES
[facility]   Cardiology II Service / Advanced Heart Failure  Daily Progress Note      Patient: Dandy Sands  MRN: [id number]  Admission Date: 6/7/2024  Hospital Day # 6    Assessment and Plan: Dandy Sands is a 63 year old male admitted on 6/7/2024 as a transfer from [place] where he was most recently admitted 5/31/2024 for weakness, malaise, and persistent diarrhea in setting of chronic driveline infection now transferred to [facility] for ongoing ZAKI cares. He has a past medical history significant for ICM s/p HM3 placement 7/8/2022 at [facility], CAD s/p PCI to LAD 2005, in-stent restenosis with TISHA to mLAD and prox RCA 05/24/2022, HLD, c. Diff 9/2022, SLE, antiphospholipid syndrome, chronic anemia, anxiety, and other conditions.     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 At that time they were opting for ongoing treatment with dalbavancin, micafungin, and bactrim, although they were less certain that candida was active contributor vs colonizer. 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 Per review of previous 2023 discharge summary also had 1+ corynebacterium striatum from OR wound cultures at that time.  Per OSH radiology read 06/03/2024 of CT abdomen/pelvis w/o contrast, no acute abnormalities but with mild ascites.  BC 05/31/2024: negative at [place].  - Re-engaging ID for plan for yeast treatment given ongoing LFT dysfunction, would prefer to not use fluconazole at this time  - Please see H&P bottom/data section for  copied positive Holly culture results dating back through 2022  - Now willing to continue Dalbavancin as this does not correlate with his diarrhea (initially was declining this as he attributed his diarrhea to this)  - Continue micafungin 100 mg IV Q24H until [place] ID follow-up   - patient needs to pick this up at local pharmacy tomorrow, he is aware  - Continue dalbavancin q2wks (due 6/14, giving 6/13 PM so he can get dose prior to discontinue )  - Bactrim 800-160 BID through 6/11 - switched to daily 6/12 per ID    Somewhat unclear exactly what regimen he has been on over last hospital stay and other recent stay, although not a comprehensive list brief review reveals the following:   Vancomycin (05/23-05/24)  Micafungin (05/24-05/27, 06/02-**)  Fluconazole (05/27-06/01)  Bactrim (05/31-**)  Cefepime (5/23 - 5/27)  Dalbavancin (06/2023-present)    Ciprofloxacain (leading up to 05/23 admission was on 250 mg PO PTA for suppression, unclear duration)      #s/p HM3 LVAD 7/08/2022 due to ICM  #s/p CRT-D (2006) s/p RV lead upgrade 1/13/23   #s/p LVAD 7/8/12  #CAD s/p PCI to LAD 2005, insent restenosis and TISHA mLAD, Prox RCA 05/2022    6/10/24 RHC RA 7, PA 19/9/12, PCW 1, AZUL CO/CI 2.96/1.7, PVR 4.06, SVR 1600    Speed: 5100, discussed decreasing d/t elevated RA and very low pcw, but deferring in favor of digoxin for now, may need to consider in the future pending clinical course  RV support: digoxin 125 mcg daily, check level ~6/15  ACEi/ARB/ARNi: historically on lisinopril and hydralazine stopped due to dizziness and hypotension  BB: defer due to previous hypotension and low ?flows   SGLT2i: none currently, previous documentation reflects concern for immunosuppression and concern for infections/unclear evidence in LVAD patients  MRA: none, reportedly due to hypotension  Anticoagulation: warfarin INR goal 2-3, INR 2.1 today  ASA: not indicated  Volume status: hypovoemic by RHC, lasix stopped 6/10, resume daily prn at  "discharge     Other: Discussed with patient and daughter that he will need to come back to [place] for VAD folllow-up at least twice per year per policy. Will try to arrange with Dr Morrison at [place] or Dr. Stewart in [place] during outreach clinics for inbetween visits, Dr. Lora will remain primary cardiologist.      # Abnormal LFTs, improving  AST/ALT 100s to 300s since at least 06/01 at [place], on admission /. Previously thought related to congestive hepatopathy, however RA ~5. Bilirubin WNL, alk phos 237. RUQ ultrasound with mildly increased echogenicity of the hepatic parenchyma with possible hepatic steatosis. Also considering that this could be drug induced.  - hepatology consulted   - \"cause likely multifactorial with cholestasis of chronic illness with driveline infection, antibiotics causing transient increase/DILI, recent fluid volume overload in setting of HM3 and ICM contributing to hepatic congestion\"  - Trend LFTs daily  - HOLDING lipitor  - resumed protonix and po iron  - smooth muscle antibody normal  - PeTH negative    # Diarrhea, chronic, resolved  History frequent diarrhea since being on antibiotics, which per his report he has been on nearly since time of placement. His most significant concern and what he finds most puzzling is that it typically goes away in the hospital and returns once home. He believes it may be due to dalbavancin although he did get a dose 05/31 and has not had diarrhea in some time so unclear. Diet is high in lactose and greasy food . No diarrhea since 5/31.  - GI consulted, luminal signed off  - Enteric panel and C. Diff negative 5/23/24 at [place]  - +norovirus, but having formed stools, so not correlating clinically, continue enteric precautions  - Pending fecal calprotectin- per GI, expect this to be elevated given positive norovirus  - H. Pylori stool antigen negative  - STRICT no lactose diet  - RD consult for further education  - send home with stool " "diary template     # C. Diff  Two admission for C. Diff 09/2022, negative 5/23/2024. No current diarrhea  - monitor    # Hemorrhoids  Interested in having these surgically fixed if possible as he states the surgeons at [facility] will not do an operation to fix them because he has a LVAD  - consider outpatient surgical consult pending clinical course      # HLD  - holding atorvastatin 40 mg PO daily as above    # SLE, antiphospholipid syndrome  - PTA hydroxychloroquine   - A/C as above     # Chronic BRENDA   Baseline Hgb 8-9, Hgb within baseline.   - LDH elevated but has VAD so not significant  - s/p Infed on 6/10  -  pcp or [facility] clinic in [place] on discharge as he would benefit from intermittent IV iron  - resume PO iron      # Goals of care  Patient explicitly does not want chest compressions or to be resuscitated for cardiac arrest, although would be ok with pre-arrest intubation. Prior provider note \"We discussed at some length how this code status creates a unique situation for him given presence of LVAD and how this would preclude chest compressions and that his ICD would likely shock him out of arrhythmias should they arise. He is accepting of this at the present time and understands that he may get a shock from his ICD. Therefore the most reasonable code status that closely matches his wishes seems to be DNR with an ok for pre-arrest intubation only. \"  - palliative care following, POLST filled out  - DNR, made DNI today per POLST form and pall care NP Saravanan Prater  - Understands and accepts he may get shocks from his ICD  - Health psychology consulted    # JOSE, prerenal  - Holding lasix as above   - Digoxin as above  - Daily BMP    Diet: Fluid restriction 2000 ML FLUID  2 Gram Sodium Diet    lactose free diet  DVT Prophylaxis: Warfarin  Fitzgerald Catheter: Not present  Cardiac Monitoring: None  Code Status:  DNR, ok for pre-arrest intubation    Naye Sharpe, INEZ, NP-C  Nurse Practitioner - Advanced Heart " "Failure/Cardiology II Service  Jonh preferred or Pager [phone]    Patient discussed with Dr. Stewart      40 minutes spent on the date of the encounter doing chart review, history and exam, documentation and further activities per the note    ================================================================    Subjective/24-Hr Events:   Last 24 hr care team notes reviewed. He is feeling well. Slept ok. All questions answered. No diarrhea.     ROS:  4 point ROS including respiratory, CV, GI and  (other than that noted in the HPI) is negative.     Medications: Reviewed in EPIC.     Physical Exam:   BP 90/76 (BP Location: Right arm)   Pulse 67   Temp (!) 96.7  F (35.9  C) (Oral)   Resp 16   Ht 1.702 m (5' 7\")   Wt 64.6 kg (142 lb 8 oz)   SpO2 99%   BMI 22.32 kg/m      GENERAL: Appears comfortable, in no distress .  HEENT: Eye symmetrical, no discharge or icterus bilaterally.   NECK: Supple, JVD <6.   CV: home of lvad, no adventitious lung sounds  RESPIRATORY: Respirations regular, even, and unlabored. Lungs CTA throughout.    GI: Soft and non distended   EXTREMITIES: No peripheral edema.   NEUROLOGIC: Alert and interacting appropriatly.  MUSCULOSKELETAL: No joint swelling or tenderness.   SKIN: No jaundice.     Labs:  CMP  Recent Labs   Lab 06/13/24  0616 06/12/24  0352 06/11/24  0430 06/10/24  0325    136 136 133*   POTASSIUM 4.0 4.4 4.1 4.0   CHLORIDE 108* 106 106 103   CO2 18* 18* 18* 17*   ANIONGAP 10 12 12 13   * 91 89 97   BUN 16.6 18.2 18.7 20.6   CR 1.17 1.20* 1.35* 1.44*   GFRESTIMATED 70 68 59* 55*   ELDA 9.3 9.6 9.3 9.3   MAG 1.9 1.9 1.9 1.8   PHOS 3.7 3.4 3.7 3.4   PROTTOTAL 6.9 7.0 6.8 7.2   ALBUMIN 4.1 4.1 4.1 4.2   BILITOTAL 0.6 0.6 0.6 0.7   ALKPHOS 209* 212* 220* 237*   * 163* 199* 245*   * 296* 335* 381*       CBC  Recent Labs   Lab 06/13/24  0616 06/12/24  0608 06/11/24  0430 06/10/24  1100 06/10/24  0325   WBC 4.2 4.2 3.9*  --  4.1   RBC 4.10* 4.10* 4.15*  --  " 4.42   HGB 8.9* 8.9* 8.7* 9.5* 9.3*   HCT 30.0* 30.0* 29.8*  --  31.3*   MCV 73* 73* 72*  --  71*   MCH 21.7* 21.7* 21.0*  --  21.0*   MCHC 29.7* 29.7* 29.2*  --  29.7*   RDW 24.4* 23.8* 23.5*  --  22.7*    176 177  --  205       INR  Recent Labs   Lab 06/13/24  0616 06/12/24  0608 06/11/24  0430 06/10/24  0325   INR 2.16* 2.53* 2.14* 1.81*

## 2024-06-13 NOTE — PROGRESS NOTES
"[id number]    Blood pressure 90/76, pulse 81, temperature 97.4  F (36.3  C), temperature source Oral, resp. rate 16, height 1.702 m (5' 7\"), weight 64.6 kg (142 lb 8 oz), SpO2 100%.     Neuro: A&Ox4.   Cardiac: Afebrile, A-Fib w/ Multiform PVCs    Respiratory: RA   GI/: Voiding spontaneously. No BM this shift.   Diet/appetite: Tolerating Lactose free diet w/ 2L Fluid Restriction. Denies nausea   Activity: Up independently or SBA with walker  Pain: Denies   Skin: Driveline Incision, Alva spot irritated.  Lines: Midline Single Lumen SL, R AC PIV SL  Drains: None  Replacement: None     Discharge home on 6/14 with IV Micafungin. Pt to discharge home with midline: Dressing changes to be managed by [facility].    LVAD dressing changed today. No LVAD alarms during the shift  "

## 2024-06-13 NOTE — PROGRESS NOTES
Met with patient to evaluate their ability to safely care for themselves and their VAD.     Writer had patient perform   Power change from wall power to batteries, Patient is able to perform without difficulties   Controller change, Patient is able to perform without difficulties    Based on the evaluation by this writer, the patient safely manage VAD: can safely manage their VAD independently

## 2024-06-13 NOTE — PROGRESS NOTES
PALLIATIVE CARE PROGRESS NOTE  [facility]     Patient Name: Dandy Sands  Date of Admission: 6/7/2024   Today the patient was seen for: goals of care     Recommendations & Counseling       GOALS OF CARE:   DNR/DNI, continued restorative/life prolonging goals  Would consult outpatient palliative care in [facility] with [facility] for ongoing management and support    ADVANCE CARE PLANNING:  Patient has an advance directive dated 05/11/2022.  Primary Health Care Agent Asha- daughter.  Alternate(s) Amos- son.   There is no POLST form on file, completed POLST TODAY, gave to patient and placed in his folder to bring home and instructed to put in on the fridge.  Code status: No CPR- NO intubation    MEDICAL MANAGEMENT:   We are not actively managing symptoms at this time.    PSYCHOSOCIAL/SPIRITUAL:  Good support from children and art is supportive as well    Palliative Care will continue to follow. Thank you for the consult and allowing us to aid in the care of Dandy Sands.    These recommendations have been discussed with primary team.    YAO Mckeon CNS  [facility], Palliative Care  Securely message with the Vocera Web Console (learn more here) or  Text page via [facility] Paging/Directory       Interval History:     Multidisciplinary collaboration:  Notes reviewed, no acute events    Notable medications:  Seroquel PRN and hydroxyzine PRN    Patient/family narrative  Saw today in bed, no specific symptoms today.     Assessment          Dandy Sands is a 63 year old male with a past medical history of ICM s/p HM3 in 2022, CAD s/p PCI in 2005, in stent restenosis with TISHA to mLAD, and prox PCA in 05/2022, HLD, SLE, antiphosholipid syndrome, chronic anemia, anxiety who presented on 5/31 with weakness, malaise, persistent diarrhea to [place] and transferred to [facility] for further cares given his chronic driveline infection.       While hospitalized its  been difficult to continue to treat his infection due to liver lab abnormalities.     Today, the patient was seen for:  ICM s/p HM3  Chronic driveline infection  Severe diarrhea  Malnutrition  Anemia  Anxiety  Insomnia        Review of Systems:     Besides above, ROS was reviewed and is unremarkable        Physical Exam:   Temp:  [96.7  F (35.9  C)-98.1  F (36.7  C)] 97.5  F (36.4  C)  Pulse:  [67-90] 76  Resp:  [16-17] 17  SpO2:  [99 %-100 %] 99 %  142 lbs 8 oz    Constitutional: Awake, alert, cooperative, frail and cachectic, no apparent distress  Lungs: No increased work of breathing, good air exchange  Cardiovascular: HM3  Neurologic: Awake, alert, oriented to name, place and time.    Skin: No rashes, erythema            Data Reviewed:     Results for orders placed or performed during the hospital encounter of 06/07/24 (from the past 24 hour(s))   INR   Result Value Ref Range    INR 2.16 (H) 0.85 - 1.15   CBC with platelets   Result Value Ref Range    WBC Count 4.2 4.0 - 11.0 10e3/uL    RBC Count 4.10 (L) 4.40 - 5.90 10e6/uL    Hemoglobin 8.9 (L) 13.3 - 17.7 g/dL    Hematocrit 30.0 (L) 40.0 - 53.0 %    MCV 73 (L) 78 - 100 fL    MCH 21.7 (L) 26.5 - 33.0 pg    MCHC 29.7 (L) 31.5 - 36.5 g/dL    RDW 24.4 (H) 10.0 - 15.0 %    Platelet Count 169 150 - 450 10e3/uL   Comprehensive metabolic panel   Result Value Ref Range    Sodium 136 135 - 145 mmol/L    Potassium 4.0 3.4 - 5.3 mmol/L    Carbon Dioxide (CO2) 18 (L) 22 - 29 mmol/L    Anion Gap 10 7 - 15 mmol/L    Urea Nitrogen 16.6 8.0 - 23.0 mg/dL    Creatinine 1.17 0.67 - 1.17 mg/dL    GFR Estimate 70 >60 mL/min/1.73m2    Calcium 9.3 8.8 - 10.2 mg/dL    Chloride 108 (H) 98 - 107 mmol/L    Glucose 163 (H) 70 - 99 mg/dL    Alkaline Phosphatase 209 (H) 40 - 150 U/L     (H) 0 - 45 U/L     (H) 0 - 70 U/L    Protein Total 6.9 6.4 - 8.3 g/dL    Albumin 4.1 3.5 - 5.2 g/dL    Bilirubin Total 0.6 <=1.2 mg/dL   Magnesium   Result Value Ref Range    Magnesium 1.9 1.7  - 2.3 mg/dL   Phosphorus   Result Value Ref Range    Phosphorus 3.7 2.5 - 4.5 mg/dL   Bilirubin direct   Result Value Ref Range    Bilirubin Direct 0.36 (H) 0.00 - 0.30 mg/dL     *Note: Due to a large number of results and/or encounters for the requested time period, some results have not been displayed. A complete set of results can be found in Results Review.     Recent Labs   Lab 06/13/24  0616 06/12/24  0608 06/12/24  0352 06/11/24  0430   WBC 4.2 4.2  --  3.9*   HGB 8.9* 8.9*  --  8.7*   MCV 73* 73*  --  72*    176  --  177   INR 2.16* 2.53*  --  2.14*     --  136 136   POTASSIUM 4.0  --  4.4 4.1   CHLORIDE 108*  --  106 106   CO2 18*  --  18* 18*   BUN 16.6  --  18.2 18.7   CR 1.17  --  1.20* 1.35*   ANIONGAP 10  --  12 12   ELDA 9.3  --  9.6 9.3   *  --  91 89   ALBUMIN 4.1  --  4.1 4.1   PROTTOTAL 6.9  --  7.0 6.8   BILITOTAL 0.6  --  0.6 0.6   ALKPHOS 209*  --  212* 220*   *  --  296* 335*   *  --  163* 199*       No results found for this or any previous visit (from the past 24 hour(s)).      Medical Decision Making       MANAGEMENT DISCUSSED with the following over the past 24 hours: cardiology, nursing   NOTE(S)/MEDICAL RECORDS REVIEWED over the past 24 hours: cardiology, nursing, LVAD SW, MCS coordinator  Tests REVIEWED in the past 24 hours:  - See lab/imaging results included in the data section of the note  Medical complexity over the past 24 hours:  - Decision to DE-ESCALATE CARE based on prognosis

## 2024-06-14 ENCOUNTER — APPOINTMENT (OUTPATIENT)
Dept: OCCUPATIONAL THERAPY | Facility: CLINIC | Age: 64
DRG: 286 | End: 2024-06-14
Attending: INTERNAL MEDICINE
Payer: COMMERCIAL

## 2024-06-14 ENCOUNTER — TELEPHONE (OUTPATIENT)
Dept: ANTICOAGULATION | Facility: CLINIC | Age: 64
End: 2024-06-14
Payer: COMMERCIAL

## 2024-06-14 VITALS
HEIGHT: 67 IN | WEIGHT: 142.5 LBS | SYSTOLIC BLOOD PRESSURE: 90 MMHG | HEART RATE: 86 BPM | RESPIRATION RATE: 16 BRPM | OXYGEN SATURATION: 100 % | BODY MASS INDEX: 22.37 KG/M2 | DIASTOLIC BLOOD PRESSURE: 76 MMHG | TEMPERATURE: 97.4 F

## 2024-06-14 DIAGNOSIS — I50.20 HEART FAILURE WITH REDUCED EJECTION FRACTION, NYHA CLASS III (H): ICD-10-CM

## 2024-06-14 DIAGNOSIS — I50.22 CHRONIC SYSTOLIC CONGESTIVE HEART FAILURE (H): Primary | ICD-10-CM

## 2024-06-14 DIAGNOSIS — Z95.811 LEFT VENTRICULAR ASSIST DEVICE PRESENT (H): ICD-10-CM

## 2024-06-14 DIAGNOSIS — Z95.811 LVAD (LEFT VENTRICULAR ASSIST DEVICE) PRESENT (H): ICD-10-CM

## 2024-06-14 LAB
ALBUMIN SERPL BCG-MCNC: 3.8 G/DL (ref 3.5–5.2)
ALP SERPL-CCNC: 194 U/L (ref 40–150)
ALT SERPL W P-5'-P-CCNC: 238 U/L (ref 0–70)
ANION GAP SERPL CALCULATED.3IONS-SCNC: 11 MMOL/L (ref 7–15)
AST SERPL W P-5'-P-CCNC: 124 U/L (ref 0–45)
BILIRUB DIRECT SERPL-MCNC: 0.31 MG/DL (ref 0–0.3)
BILIRUB SERPL-MCNC: 0.6 MG/DL
BUN SERPL-MCNC: 16.1 MG/DL (ref 8–23)
CALCIUM SERPL-MCNC: 9.2 MG/DL (ref 8.8–10.2)
CHLORIDE SERPL-SCNC: 109 MMOL/L (ref 98–107)
CREAT SERPL-MCNC: 1.08 MG/DL (ref 0.67–1.17)
DEPRECATED HCO3 PLAS-SCNC: 18 MMOL/L (ref 22–29)
EGFRCR SERPLBLD CKD-EPI 2021: 78 ML/MIN/1.73M2
ERYTHROCYTE [DISTWIDTH] IN BLOOD BY AUTOMATED COUNT: 24.9 % (ref 10–15)
GLUCOSE SERPL-MCNC: 140 MG/DL (ref 70–99)
HCT VFR BLD AUTO: 28.8 % (ref 40–53)
HGB BLD-MCNC: 8.5 G/DL (ref 13.3–17.7)
INR PPP: 1.95 (ref 0.85–1.15)
MAGNESIUM SERPL-MCNC: 1.8 MG/DL (ref 1.7–2.3)
MCH RBC QN AUTO: 21.9 PG (ref 26.5–33)
MCHC RBC AUTO-ENTMCNC: 29.5 G/DL (ref 31.5–36.5)
MCV RBC AUTO: 74 FL (ref 78–100)
PHOSPHATE SERPL-MCNC: 3.6 MG/DL (ref 2.5–4.5)
PLATELET # BLD AUTO: 170 10E3/UL (ref 150–450)
POTASSIUM SERPL-SCNC: 3.9 MMOL/L (ref 3.4–5.3)
PROT SERPL-MCNC: 6.6 G/DL (ref 6.4–8.3)
RBC # BLD AUTO: 3.88 10E6/UL (ref 4.4–5.9)
SODIUM SERPL-SCNC: 138 MMOL/L (ref 135–145)
WBC # BLD AUTO: 4.6 10E3/UL (ref 4–11)

## 2024-06-14 PROCEDURE — 250N000013 HC RX MED GY IP 250 OP 250 PS 637

## 2024-06-14 PROCEDURE — 80053 COMPREHEN METABOLIC PANEL: CPT | Performed by: STUDENT IN AN ORGANIZED HEALTH CARE EDUCATION/TRAINING PROGRAM

## 2024-06-14 PROCEDURE — 250N000011 HC RX IP 250 OP 636: Mod: JZ | Performed by: PHYSICIAN ASSISTANT

## 2024-06-14 PROCEDURE — 99239 HOSP IP/OBS DSCHRG MGMT >30: CPT | Mod: 25 | Performed by: NURSE PRACTITIONER

## 2024-06-14 PROCEDURE — 85027 COMPLETE CBC AUTOMATED: CPT | Performed by: STUDENT IN AN ORGANIZED HEALTH CARE EDUCATION/TRAINING PROGRAM

## 2024-06-14 PROCEDURE — 83735 ASSAY OF MAGNESIUM: CPT | Performed by: STUDENT IN AN ORGANIZED HEALTH CARE EDUCATION/TRAINING PROGRAM

## 2024-06-14 PROCEDURE — 93750 INTERROGATION VAD IN PERSON: CPT | Performed by: NURSE PRACTITIONER

## 2024-06-14 PROCEDURE — 85610 PROTHROMBIN TIME: CPT

## 2024-06-14 PROCEDURE — 250N000013 HC RX MED GY IP 250 OP 250 PS 637: Performed by: INTERNAL MEDICINE

## 2024-06-14 PROCEDURE — 82248 BILIRUBIN DIRECT: CPT | Performed by: PHYSICIAN ASSISTANT

## 2024-06-14 PROCEDURE — 258N000003 HC RX IP 258 OP 636: Mod: JZ | Performed by: PHYSICIAN ASSISTANT

## 2024-06-14 PROCEDURE — 250N000013 HC RX MED GY IP 250 OP 250 PS 637: Performed by: NURSE PRACTITIONER

## 2024-06-14 PROCEDURE — 84100 ASSAY OF PHOSPHORUS: CPT | Performed by: STUDENT IN AN ORGANIZED HEALTH CARE EDUCATION/TRAINING PROGRAM

## 2024-06-14 PROCEDURE — 97535 SELF CARE MNGMENT TRAINING: CPT | Mod: GO | Performed by: OCCUPATIONAL THERAPIST

## 2024-06-14 RX ORDER — WARFARIN SODIUM 5 MG/1
5 TABLET ORAL ONCE
Status: COMPLETED | OUTPATIENT
Start: 2024-06-14 | End: 2024-06-14

## 2024-06-14 RX ADMIN — WARFARIN SODIUM 5 MG: 5 TABLET ORAL at 09:33

## 2024-06-14 RX ADMIN — FERROUS SULFATE TAB 325 MG (65 MG ELEMENTAL FE) 325 MG: 325 (65 FE) TAB at 07:52

## 2024-06-14 RX ADMIN — PANTOPRAZOLE SODIUM 40 MG: 40 TABLET, DELAYED RELEASE ORAL at 07:51

## 2024-06-14 RX ADMIN — SULFAMETHOXAZOLE AND TRIMETHOPRIM 1 TABLET: 800; 160 TABLET ORAL at 07:51

## 2024-06-14 RX ADMIN — Medication 300 MG: at 07:51

## 2024-06-14 RX ADMIN — GABAPENTIN 100 MG: 100 CAPSULE ORAL at 07:51

## 2024-06-14 RX ADMIN — ASPIRIN 81 MG: 81 TABLET ORAL at 07:51

## 2024-06-14 RX ADMIN — TAMSULOSIN HYDROCHLORIDE 0.4 MG: 0.4 CAPSULE ORAL at 07:52

## 2024-06-14 RX ADMIN — MICAFUNGIN SODIUM 100 MG: 50 INJECTION, POWDER, LYOPHILIZED, FOR SOLUTION INTRAVENOUS at 07:47

## 2024-06-14 ASSESSMENT — ACTIVITIES OF DAILY LIVING (ADL)
ADLS_ACUITY_SCORE: 46

## 2024-06-14 NOTE — PROGRESS NOTES
Care Management Discharge Note    Discharge Date: 06/14/2024       Discharge Disposition: Home    Discharge Services:  Home IV Micafungin Infusions: Drug/flush supplies per [facility]--pt will  supplies by 4:30pm this evening and they will do a refresher teach. I fax'd AVS(early this morning) and discharge summary. Bedside RN paula ROY placed an extension tubing.    [facility]   [address] [place] [address], [place] [address]  To have weekly Midline dressing changes and labs--[facility]: Dr Saravia is to order this per Paula at infusion center and I have called and fax'd to ID clinic to do this--pt aware and he knows them well and will call them.    [facility]  Ph: [phone] Fax: [phone] Rosaline--sent a message to the ID RN to get pt scheduled in 1-2 weeks on 6/12.  I have fax'd AVS and summary today.    [facility] [address] [place] [address], [place] : DR Morrison--I forgot to document that I called yesterday and pt is scheduled on 67/21 @ 10am with this heart failure team. I have fax'd AVS/summary to them today.        Discharge DME:  None    Discharge Transportation: family or friend will provide        Education Provided on the Discharge Plan:  Yes  Persons Notified of Discharge Plans: Pt. Chantal Sharpe NP cards 2 and bedside RN Paula ROY.  Patient/Family in Agreement with the Plan:  Yes    Handoff Referral Completed: Yes    Additional Information:  Son to be here by 10:30am for ride home--bedside RN aware.    Leann Falcon, RN

## 2024-06-14 NOTE — PLAN OF CARE
Occupational Therapy Discharge Summary    Reason for therapy discharge:    Discharged to home with outpatient therapy.    Progress towards therapy goal(s). See goals on Care Plan in [facility] electronic health record for goal details.  Pt. Indep. With BADLs and functional mobility. Has A from family with dressing changes.    Therapy recommendation(s):    Continued therapy is recommended.  Rationale/Recommendations:  continue with OP CR.

## 2024-06-14 NOTE — TELEPHONE ENCOUNTER
ANTICOAGULATION  MANAGEMENT: Discharge Review    Dandy Sands chart reviewed for anticoagulation continuity of care    Hospital Admission on 6/7-6/14/24 for Abnormal liver tests, chronic drive line infection.    Discharge disposition: Home    Results:    Recent labs: (last 7 days)     06/08/24  0435 06/08/24  1619 06/08/24  2354 06/09/24  0449 06/09/24  1124 06/10/24  0325 06/11/24  0430 06/11/24  1114 06/12/24  0608 06/13/24  0616 06/14/24  0450   INR 1.73*  --   --  1.79*  --  1.81* 2.14*  --  2.53* 2.16* 1.95*   AAUFH  --  0.19 0.62 0.28 0.38 0.28 0.24 0.14  --   --   --      Anticoagulation inpatient management:     See calendar    Anticoagulation discharge instructions:     Warfarin dosing:  Patient was given 5mg on 6/14 prior to leaving and discharge instructions indicate 5mg 6/15-6/16/24   Bridging: No   INR goal change: No      Medication changes affecting anticoagulation: No Patient is starting on Digoxin.  This will not interact with warfarin.  Patient is also resuming Delvance.  Patient was on this prior to discharge.  Bactrim is being reduced to 1 DS daily from BID    Additional factors affecting anticoagulation: Yes: Patient has elevated liver enzymes     PLAN     Agree with discharge plan for follow up on 6/17/24    My chart message was sent    Anticoagulation Calendar updated    Holli Silva RN

## 2024-06-14 NOTE — PHARMACY-ANTICOAGULATION SERVICE
Clinical Pharmacy- Warfarin Discharge Note  This patient is currently on warfarin for the treatment of LVAD.  INR Goal= 2-3  Expected length of therapy lifetime.         Anticoagulation Dose History  More data exists         Latest Ref Rng & Units 6/8/2024 6/9/2024 6/10/2024 6/11/2024 6/12/2024 6/13/2024 6/14/2024   Recent Dosing and Labs   warfarin ANTICOAGULANT (COUMADIN) 2 MG tablet - - 4 mg, $Given 4 mg, $Given - 2 mg, $Given - -   warfarin ANTICOAGULANT (COUMADIN) 3 MG tablet - 3 mg, $Given - - 3 mg, $Given - 3 mg, $Given -   INR 0.85 - 1.15 1.73  1.79  1.81  2.14  2.53  2.16  1.95        Vitamin K doses administered during the last 7 days: none  FFP administered during the last 7 days: none    Assessment/Recommendations:  Most recently as outpatient, pt was receiving warfarin 1.25 mg Sun, Wed, Fri; 2.5 mg ROW. This was a reduced regimen for several INRs >3, which were the result of a previous regimen of warfarin 7.5 mg Mon, Thu; 5 mg ROW.  Prior to admission, pt was on Bactrim 1 DS tab BID for chronic driveline infection. This dose has been reduced to Bactrim 1 DS tab once daily, as of 6/12.  Will give warfarin 5 mg x1 today 6/14 prior to pt discharge (pharmacy will order inpatient dose).  Recommend pt take warfarin 5 mg on 6/15 and 5 mg on 6/16.  Per primary team, pt has INR recheck scheduled for 6/17; outpatient anticoagulation clinic will resume management based on this result.    Nigel Stern, PharmD, BCPS

## 2024-06-14 NOTE — PROGRESS NOTES
NURSING PROGRESS NOTE  Shift Summary      Date: June 14, 2024     Neuro/Musculoskeletal:  A&Ox4.   Cardiac:  SR with PVC's  VSS.            Respiratory:  Sating in the 90s on RA.  GI/:  Adequate urine output.  LBM: Yesterday  Diet/Appetite:  Tolerating Lactose free diet 2 L FR  Activity: Independent with walker   Pain:  Denies.   Skin:  No new deficits noted. LVAD driveline site dressing q 2 days/ dry  LDAs + Drips/IVF:  Midline for blood draws on left upper arm/ PIV on R arm. LVAD  Protocols/Labs:  per orders     Pertinent Shift Updates:  Uneventful night with no acute changes. No LVAD alarms.         Plan:  Discharge to home Today      Roni Lamb RN  .................................................... June 14, 2024   5:23 AM  [facility] ([facility]): [place]  Stepdown ICU (Unit 6D)

## 2024-06-14 NOTE — DISCHARGE SUMMARY
[facility]   Cardiology II Service / Advanced Heart Failure  Discharge Summary     Dandy Sands MRN# [id number]   YOB: 1960 Age: 63 year old     DATE OF ADMISSION:  6/7/2024  DATE OF DISCHARGE: 6/14/2024  ADMITTING PROVIDER: Layne Prabhakar MD  DISCHARGE PROVIDER: Justo Stewart MD  and Naye Sharpe DNP, ANP-C   PRIMARY PROVIDER:  Austin Sierra    ADMIT DIAGNOSES:   Elevated LFT ?congestive hepatopathy  Chronic LVAD drive line infection  ICM s/p HM3 LVAD  SLE, APLS  Chronic intermittent diarrhea    DISCHARGE DIAGNOSES:   Elevated LFTs, improved, unclear etiology  Chronic LVAD drive line infection  ICM s/p HM3 LVAD  SLE, APLS  Chronic intermittent diarrhea, resolved  Goals of care     FOLLOW-UP:  [] INR Monday  [] [place] ID clinic 1-2 weeks  [] cardiology clinic locally 6/21, digoxin level at that time    PENDING RESULTS:   none    HPI: Please see the detailed H & P by Tony Santana and Vanna from 6/7/2024. Briefly, Dandy Sands is a 63 year old male admitted on 6/7/2024 as a transfer from [place] [facility] where he was most recently admitted 05/31/2024 for weakness, malaise, and persistent diarrhea in setting of chronic driveline infection now transferred to [facility] for ongoing ZAKI cares. He has a past medical history significant for ICM s/p HM3 placement 07/08/2022 at [facility], CAD s/p PCI to LAD 2005, in-stent restenosis with TISHA to mLAD and prox RCA 05/24/2022, HLD, c. Diff 09/2022, SLE, antiphospholipid syndrome, chronic anemia, anxiety, and other conditions.      Mr. Gross reports that overall today he is feeling fine.  He states that his main complaint is the chronic longstanding diarrhea he has which is ruining his quality of life.  He states that he is not able to go out for more than a single hour without having large-volume diarrhea, however he does note that every single time he comes to the hospital this seems to improve.  He believes that the diarrhea is related to dalbavancin as it  has been an ongoing challenge ever since he first developed a chronic LVAD infection and was started on this medication.     He otherwise currently denies chest pain lightheadedness dizziness nausea vomiting or recent blood in his stools.  As discussed above, he states that he would not want to have chest compressions understands he has an ICD, so may get shocked, how ever would be okay with a breathing tube and a prearrest situation as well as other ICU level cares. His main goals are to be able to at least go to the store with his family and then out for a pizza or something similar without having it interrupted by diarrhea.     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 At that time they were opting for ongoing treatment with dalbavancin, micafungin, and bactrim, although they were less certain that candida was active contributor vs colonizer. 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 Per review of previous 2023 discharge summary also had 1+ corynebacterium striatum from OR wound cultures at that time.  Per OSH radiology read 06/03/2024 of CT abdomen/pelvis w/o contrast, no acute abnormalities but with mild ascites. BC 05/31/2024: negative at [facility].  - re-engaged ID for plan for yeast treatment given ongoing LFT dysfunction, would prefer to not use fluconazole at this time   - q24hr IV micafungin 100 mg at home - follow-up with local ID providers to discuss timing of diflucan start, pending LFT trend  - continue q2wk dose of Dalbavancin 1500 mg, this was given on 6/13, due ~6/28 at local infusion center  - Bactrim 800-160 BID through 6/11 - switched to daily 6/12 per ID      Somewhat unclear exactly what regimen he has been on over last hospital stay and other recent stay, although not a comprehensive list brief review reveals the following:   Vancomycin (05/23-05/24)  Micafungin (05/24-05/27, 06/02-**)  Fluconazole (05/27-06/01)  Bactrim (05/31-**)  Cefepime (5/23 - 5/27)  Dalbavancin (06/2023-present)    Ciprofloxacain (leading up to 05/23 admission was on 250 mg PO PTA for suppression, unclear duration)      #s/p HM3 LVAD 7/08/2022 due to ICM  #s/p CRT-D (2006) s/p RV lead upgrade 1/13/23   #s/p LVAD 7/8/12  #CAD s/p PCI to LAD 2005, insent restenosis and TISHA mLAD, Prox RCA 05/2022  Underwent RHC on 6/10/24 RA 7, PA 19/9/12, PCW 1, AZUL CO/CI 2.96/1.7, PVR 4.06, SVR 1600.      Speed: 5100, discussed decreasing d/t elevated RA and very low pcw, but deferring in favor of digoxin for now, may need to consider in the future pending clinical course  RV support: digoxin 125 mcg daily, consider checking level after 6/15 as outpatient  ACEi/ARB/ARNi: historically on lisinopril and hydralazine stopped due to dizziness and hypotension  BB: defer due to previous hypotension and low ?flows   SGLT2i: none currently, deferred  MRA: none, reportedly due to hypotension  Anticoagulation: warfarin INR goal 2-3, INR 2 today, recommend 5 mg daily until Monday and INR Monday  ASA: not indicated  Volume status: hypovolemic by RHC, lasix stopped 6/10, resume daily prn at discharge, stopped kcl   Other: Discussed with patient and daughter that he will need to come back to [place] for VAD folllow-up at least twice per year per policy. He is scheduled with Dr Morrison 6/21. Dr. Lora will remain primary VAD cardiologist.      # Abnormal LFTs, improving  AST/ALT 100s to 300s since at least 06/01 at [place], on admission /. Previously thought related to congestive hepatopathy, however RA ~5. Bilirubin WNL, alk phos 237. RUQ ultrasound with mildly increased echogenicity of the hepatic  "parenchyma with possible hepatic steatosis. Also considering that this could be drug induced.  - hepatology consulted               - \"cause likely multifactorial with cholestasis of chronic illness with driveline infection, antibiotics causing transient increase/DILI, recent fluid volume overload in setting of HM3 and ICM contributing to hepatic congestion\"  - Trend LFTs daily, improved daily  - HOLDING lipitor and APAP prn  - resumed protonix and po iron  - smooth muscle antibody normal and PeTH negative     # Diarrhea, chronic, resolved  History frequent diarrhea since being on antibiotics, which per his report he has been on nearly since time of placement. His most significant concern and what he finds most puzzling is that it typically goes away in the hospital and returns once home. He believes it may be due to dalbavancin although he did get a dose 05/31 and has not had diarrhea in some time so unclear. Diet is high in lactose and greasy food . No diarrhea since 5/31.  - GI consulted, luminal signed off  - Enteric panel and C. Diff negative 5/23/24 at [facility]  - +norovirus, but having formed stools, so not correlating clinically, continue enteric precautions  - Pending fecal calprotectin- per GI, expect this to be elevated given positive norovirus  - H. Pylori stool antigen negative  - STRICT no lactose diet, patient aware  - RD consulted for further education    # Chronic BRENDA   Baseline Hgb 8-9, Hgb within baseline.   - LDH elevated but has VAD so not significant  - s/p Infed on 6/10  - pcp or anemia clinic in [place] on discharge as he would benefit from intermittent IV iron  - resumed PO iron prior to discharge     # Goals of care  Patient explicitly does not want chest compressions or to be resuscitated for cardiac arrest, although would be ok with pre-arrest intubation. Prior provider note \"We discussed at some length how this code status creates a unique situation for him given presence of LVAD and how this " "would preclude chest compressions and that his ICD would likely shock him out of arrhythmias should they arise. He is accepting of this at the present time and understands that he may get a shock from his ICD. Therefore the most reasonable code status that closely matches his wishes seems to be DNR with an ok for pre-arrest intubation only. \"  - palliative care following, POLST filled out  - DNR, made DNI today per POLST form and pall care NP Saravanan Prater  - Understands and accepts he may get shocks from his ICD  - Health psychology consulted     # JOSE, prerenal. Holding lasix as above, use daily prn as outpatient. Cr 1 on day of d.c.     Chronic issues:   # C. Diff. Two admission for C. Diff 09/2022, negative 5/23/2024. No current diarrhea.  # Hemorrhoids. Interested in having these surgically fixed if possible as he states the surgeons at [facility] will not do an operation to fix them because he has a LVAD. Consider outpatient surgical consult pending clinical course.  # HLD. Holding atorvastatin 40 mg PO daily as above.  # SLE, antiphospholipid syndrome. PTA hydroxychloroquine, A/C as above       Naye Sharpe, INEZ, ANP-C  Advanced Heart Failure/Cardiology 2 Nurse Practitioner  6/14/2024  Vocera preferred, pr Pager: [phone]    PHYSICAL EXAM:  Blood pressure 90/76, pulse 84, temperature 98  F (36.7  C), temperature source Oral, resp. rate 16, height 1.702 m (5' 7\"), weight 64.6 kg (142 lb 8 oz), SpO2 100%.    GENERAL: Appears comfortable, in no distress .  HEENT: Eye symmetrical, no discharge or icterus bilaterally.   NECK: Supple, JVD <6.   CV: home of lvad, no adventitious lung sounds  RESPIRATORY: Respirations regular, even, and unlabored. Lungs CTA throughout.    GI: Soft and non distended   EXTREMITIES: No peripheral edema.   NEUROLOGIC: Alert and interacting appropriatly.  MUSCULOSKELETAL: No joint swelling or tenderness.   SKIN: No jaundice.     LABS:   Last CBC:   Recent Labs   Lab Test 06/14/24  0450 "   WBC 4.6   RBC 3.88*   HGB 8.5*   HCT 28.8*   MCV 74*   MCH 21.9*   MCHC 29.5*   RDW 24.9*          Last CMP:  Recent Labs   Lab Test 06/14/24  0450      POTASSIUM 3.9   CHLORIDE 109*   ELDA 9.2   CO2 18*   BUN 16.1   CR 1.08   *   *   *   BILITOTAL 0.6   ALBUMIN 3.8   PROTTOTAL 6.6   ALKPHOS 194*       IMAGING:  Results for orders placed or performed during the hospital encounter of 06/07/24   US Abdomen Limited    Narrative    EXAMINATION: Right upper quadrant ultrasound 6/7/2024 9:44 AM     HISTORY: Elevated LFTs of unknown origin.       TECHNIQUE: The abdomen was scanned in standard fashion with  specialized ultrasound transducer(s) using both gray-scale, color  Doppler, and spectral flow techniques.    COMPARISON: 5/20/2022 right upper quadrant ultrasound    FINDINGS:     Fluid: No evidence of ascites or pleural effusions.    Liver: The liver mildly increased echogenicity relative to the renal  cortical parenchyma, measuring 16.9 cm in craniocaudal dimension.  There is no focal mass.     Gallbladder: There is no wall thickening, pericholecystic fluid, or  positive sonographic Prater's sign. Questionable small gallstones near  the neck of the gallbladder. Biliary sludge is present.      Bile Ducts: No significant intrahepatic biliary dilatation.  The  common bile duct is not visualized this study.     Pancreas: Pancreas was unable to be visualized in this study.    Kidney: The right kidney measures 10.7 cm long. There is no  hydronephrosis or hydroureter, no shadowing renal calculi, cystic  lesion or mass.       Impression    IMPRESSION:  1. Mildly increased echogenicity of the hepatic parenchyma relative to  the right renal cortex may be seen in hepatic steatosis.  2. Questionable gallstones in the neck of the gallbladder with biliary  sludge without evidence for acute cholecystitis.  3. The pancreas and common bile duct could not be visualized in this  study.    I have  personally reviewed the examination and initial interpretation  and I agree with the findings.    RAUL PELAYO DO         SYSTEM ID:  [id number]   US Abdominal Doppler Complete    Narrative    EXAMINATION: Right upper quadrant Doppler ultrasound, 6/10/2024 3:11  PM     COMPARISON: 2024 right upper quadrant ultrasound.    HISTORY: Doppler and flow evaluation per GI.    TECHNIQUE:  Grey-scale, color Doppler and spectral flow analysis of  the right upper quadrant vasculature.    FINDINGS:  LIVER DOPPLER:  Splenic vein: Could not identify the splenic vein on this study  secondary to obscuration by LVAD device.  Extrahepatic portal vein:  Patent continuous antegrade flow, 42  cm/sec.  Right portal vein flow is antegrade, measuring 25 cm/sec.  Left portal vein flow is antegrade, measuring 19 cm/sec.    Inferior vena cava patent with flow toward the heart throughout..  Extrahepatic IVC:  20 cm/sec.    Right, mid, left hepatic veins: Patent with flow towards the inferior  vena cava.    Extrahepatic hepatic artery: Low resistance waveform with flow towards  the liver. 23 cm/sec with resistive index 0.66.      Impression    Impression:   1.  Normal Doppler examination of the right upper quadrant.   2.  The splenic vein could not be assessed as it was obscured by the  LVAD device.    I have personally reviewed the examination and initial interpretation  and I agree with the findings.    RAHEEM MEAD MD         SYSTEM ID:  [id number]   Echo Complete     Value    LVEF  <30% (severely reduced)    Narrative    [id number]  [id number]  [id number]  [id number]^DARRYL^ASAF^MARCUS     [facility],[place]  Echocardiography Laboratory  [address] [place] [id number]     Name: OLEG KAUR  MRN: [id number]  : 1960  Study Date: 2024 01:42 PM  Age: 63 yrs  Gender: Male  Patient Location: [facility]  Reason For Study: Heart Failure - Left  Ordering Physician: ASAF ANDREWS  Referring Physician:  SYSTEM, PROVIDER NOT IN  Performed By: Ralph Payne RDCS     BSA: 1.7 m2  Height: 67 in  Weight: 142 lb  HR: 73  ______________________________________________________________________________  Procedure  LVAD Echocardiogram with two-dimensional, color and spectral Doppler  performed.  ______________________________________________________________________________  Interpretation Summary  HM3 5100 RPM.     Mild left ventricular dilation is present. LVID:6.1 CM  Left ventricular function is decreased. The ejection fraction is <30%  (severely reduced). Septum midline.  Mild right ventricular dilation is present.  AV is closed. Mild aortic insufficiency is present.  Septum is midline.  Global right ventricular function is mild to moderately reduced.  LV outflow graft cannula flow and velocity is normal.     This study was compared with the study from 1/12/2023 .No significant changes  noted.     ______________________________________________________________________________  Left Ventricle  LV eccentric hypertrophy. Mild left ventricular dilation is present. Left  ventricular function is decreased. The ejection fraction is <30% (severely  reduced). Severe diffuse hypokinesis is present.     Right Ventricle  Mild right ventricular dilation is present. Global right ventricular function  is moderately reduced. A pacemaker lead is noted in the right ventricle.     Atria  The atria cannot be assessed.     Mitral Valve  Mild mitral insufficiency is present.     Aortic Valve  The aortic valve is tricuspid. Mild aortic insufficiency is present.     Tricuspid Valve  Mild tricuspid insufficiency is present.     Pulmonic Valve  The pulmonic valve is normal.     Vessels  The aorta root is normal. IVC diameter and respiratory changes fall into an  intermediate range suggesting an RA pressure of 8 mmHg.     Pericardium  No pericardial effusion is present.     Compared to Previous Study  This study was compared with the study from  1/12/2023 . No significant changes  noted.  ______________________________________________________________________________  MMode/2D Measurements & Calculations     IVSd: 0.81 cm  LVIDd: 6.1 cm  LVIDs: 6.1 cm  LVPWd: 0.94 cm  FS: 0.75 %  LV mass(C)d: 215.8 grams  LV mass(C)dI: 123.5 grams/m2  RWT: 0.31     ______________________________________________________________________________  Report approved by: Tashia FLORES 06/07/2024 03:28 PM         Cardiac Catheterization    Narrative      Right sided filling pressures are normal.    Left sided filling pressures are normal.    Normal PA pressures.    Reduced cardiac output level.    Hemodynamic data has been modified in Epic per physician review.    Low filling pressures bilaterally suggest dehydration.  Normal pulmonary artery pressures.  Reduced cardiac output.        *Note: Due to a large number of results and/or encounters for the requested time period, some results have not been displayed. A complete set of results can be found in Results Review.       PROCEDURES:  Right heart catheterization    CONSULTATIONS:   [facility]  Palliative care  Health psych  Social work  Infectious disease  Gastroenterology     DISCHARGE MEDICATIONS:  Discharge Medication List as of 6/14/2024  9:52 AM        START taking these medications    Details   digoxin (LANOXIN) 125 MCG tablet Take 1 tablet (125 mcg) by mouth every evening, Disp-90 tablet, R-3, E-Prescribe           CONTINUE these medications which have CHANGED    Details   furosemide (LASIX) 20 MG tablet Take 1 tablet (20 mg) by mouth daily as needed (as directed by cardiology), Disp-30 tablet, R-3, E-Prescribe      hydroxychloroquine 300 MG TABS Take 300 mg by mouth daily, Disp-30 tablet, R-3, E-Prescribe      micafungin 100 mg Inject 100 mg into the vein every 24 hours, Disp-14 Dose, R-0, Local Print      sulfamethoxazole-trimethoprim (BACTRIM DS) 800-160 MG tablet Take 1 tablet by mouth daily, Disp-90 tablet, R-3,  E-Prescribe      tamsulosin (FLOMAX) 0.4 MG capsule Take 1 capsule (0.4 mg) by mouth daily, Disp-30 capsule, R-0, E-Prescribe      warfarin ANTICOAGULANT (COUMADIN) 2 MG tablet Take 3 mg daily or as directed. INR on Monday 6/17/24., Disp-90 tablet, R-3, E-Prescribe           CONTINUE these medications which have NOT CHANGED    Details   aspirin (ASA) 81 MG EC tablet Take 1 tablet (81 mg) by mouth daily, Historical      gabapentin (NEURONTIN) 100 MG capsule TAKE 1 Capsule BY MOUTH EVERY MORNING, NOON & BEDTIME, Historical      omeprazole (PRILOSEC) 40 MG DR capsule Take 40 mg by mouth 2 times daily, Historical      amoxicillin (AMOXIL) 500 MG capsule Take 4 capsules (2,000 mg) by mouth as needed (take 1 hour prior to any dental procedures), Disp-4 capsule, R-3, E-Prescribe      dalbavancin (DALVANCE) 20 mg/mL Inject into the vein every 14 days, Historical      ferrous sulfate (FEROSUL) 325 (65 Fe) MG tablet Take 1 tablet (325 mg) by mouth every other day, Disp-15 tablet, R-3, E-Prescribe      loperamide (IMODIUM) 2 MG capsule Take 1 capsule (2 mg) by mouth 2 times daily as needed for diarrhea, Disp-30 capsule, R-0, E-Prescribe           STOP taking these medications       acetaminophen (TYLENOL) 325 MG tablet Comments:   Reason for Stopping:         atorvastatin (LIPITOR) 40 MG tablet Comments:   Reason for Stopping:         lidocaine (LIDODERM) 5 % patch Comments:   Reason for Stopping:         lisinopril (ZESTRIL) 10 MG tablet Comments:   Reason for Stopping:         lisinopril (ZESTRIL) 5 MG tablet Comments:   Reason for Stopping:         potassium chloride hector ER (KLOR-CON M20) 20 MEQ CR tablet Comments:   Reason for Stopping:               DISCHARGE DISPOSITION: Dandy Sands will discharge to home in stable condition.     DISCHARGE INSTRUCTIONS:  Discharge Procedure Orders   Cardiac Rehab  Referral   Standing Status: Future   Referral Priority: Routine Referral Type: Rehab Therapy Cardiac Therapy    Number of Visits Requested: 1     Home Infusion Referral   Referral Priority: Routine: Next available opening Referral Type: Consultation   Number of Visits Requested: 30     Reason for your hospital stay   Order Comments: Abnormal liver tests, chronic drive line infection     Activity   Order Comments: Your activity upon discharge: activity as tolerated     Order Specific Question Answer Comments   Is discharge order? Yes      Follow Up and recommended labs and tests   Order Comments: Follow up with [facility] in 1-2 weeks  [facility]   Ph: [phone] Fax: [phone]   Friday, 6/21  Time: 8:30am  DR: Guy Norris  ____________________________________________________________________        Follow-up with cardiologist Dr Morrison first available: Friday, 6/21/24  Time: 10am  Dr: heart failure team  _____________________________________________________________________          Follow-up with [facility] LVAD clinic twice yearly for VAD management  ___________________________________________________________________    You need an INR draw on Monday, 6/17--results to [facility] Anticoagulation clinic--resume     No CPR- Do NOT Intubate     Order Specific Question Answer Comments   Code status determined by: Discussion with patient/ legal decision maker      Diet   Order Comments: Follow this diet upon discharge: Orders Placed This Encounter      Fluid restriction 2000 ML FLUID      No Lactose Diet     Order Specific Question Answer Comments   Is discharge order? Yes        70 minutes spent in discharge, including >50% in counseling and coordination of care, medication review and plan of care recommended on follow up. Questions were answered, patient agrees to plan.

## 2024-06-14 NOTE — DISCHARGE SUMMARY
DISCHARGE   Discharged to: Home  Via: Automobile  Accompanied by: Family  Discharge Instructions: diet, activity, medications, follow up appointments, when to call the MD, and what to watchout for (i.e. s/s of infection, increasing SOB, palpitations, chest pain,)  Prescriptions: To be filled by home pharmacy per pt's request; medication list reviewed & sent with pt  Follow Up Appointments: arranged; information given  Belongings: All sent with pt  IV: out  Telemetry: off  Pt exhibits understanding of above discharge instructions; all questions answered.  Discharge Paperwork: faxed   negative...

## 2024-06-14 NOTE — PROCEDURES
The patient's HeartMate LVAD was interrogated 6/14/2024  * Speed 5100 rpm   * Pulsatility index 5.4  * Power 3.5 [employer]   * Flow 3.5 L/minute   Fluid status: euvolemic   Alarms were reviewed, and notable for no PI events over last 24hr.  The driveline exit site was inspected, dressing cdi.   All external components were inspected and showed no evidence of damage or malfunction, none replaced.   No changes to VAD settings made

## 2024-06-17 ENCOUNTER — DOCUMENTATION ONLY (OUTPATIENT)
Dept: OTHER | Facility: CLINIC | Age: 64
End: 2024-06-17
Payer: COMMERCIAL

## 2024-06-17 ENCOUNTER — PATIENT OUTREACH (OUTPATIENT)
Dept: CARE COORDINATION | Facility: CLINIC | Age: 64
End: 2024-06-17
Payer: COMMERCIAL

## 2024-06-17 ENCOUNTER — TELEPHONE (OUTPATIENT)
Dept: CARDIOLOGY | Facility: CLINIC | Age: 64
End: 2024-06-17
Payer: COMMERCIAL

## 2024-06-17 ENCOUNTER — CARE COORDINATION (OUTPATIENT)
Dept: CARDIOLOGY | Facility: CLINIC | Age: 64
End: 2024-06-17
Payer: COMMERCIAL

## 2024-06-17 NOTE — PROGRESS NOTES
"Called patient/caregiver to check in post hospital discharge. Pt reports VAD parameters normal and weight 144 lbs. Reviewed medications and answered any questions. Patient reports he is happy to be home, sleeping and feeling really well.    Discussed upcoming appointments in which patient states he will not be coming back to [place] \"for at least a few months.\" Reeducated patient on importance of following with providers, however patient was adamant that he will need to reschedule his appointments until September/October. Message sent to schedulers. Encouraged pt to page VAD Coordinator with any issues or questions. Pt verbalizes understanding.    "

## 2024-06-17 NOTE — PROGRESS NOTES
[facility]: [facility]    Background: Transitional Care Management program identified per system criteria and reviewed by [facility] team for possible outreach.    Assessment: Upon chart review, CCR[facility] Team member will not proceed with patient outreach related to this episode of Transitional Care Management program due to reason below:    [facility] TCU: Patient discharged to TCU/ARU/LTACH and is established within [facility] Primary [facility]. Referral created for Primary Care-Care Coordination program.    Plan: Transitional Care Management episode addressed appropriately per reason noted above.      PETER Rubio  Connected Care Resource [place], [facility]    *Connected Care Resource Team does NOT follow patient ongoing. Referrals are identified based on internal discharge reports and the outreach is to ensure patient has an understanding of their discharge instructions.

## 2024-06-17 NOTE — TELEPHONE ENCOUNTER
6/17 Patient confirmed scheduled appointment:  Date: 10/2/2024  Time: 8:20 AM  Visit type: RETURN VAD  Provider: HUMERA  Location: [facility]  Testing/imaging: N/A  Additional notes: N/A

## 2024-06-17 NOTE — TELEPHONE ENCOUNTER
6/17 Patient confirmed scheduled appointment:  Date: 10/1/2024  Time: 9:40 AM  Visit type: RETURN EP  Provider: MARLENE  Location: [facility]  Testing/imaging: DEVICE CHECK PRIOR AND LABS PRIOR   Additional notes: N/A

## 2024-06-19 ENCOUNTER — TELEPHONE (OUTPATIENT)
Dept: ANTICOAGULATION | Facility: CLINIC | Age: 64
End: 2024-06-19
Payer: COMMERCIAL

## 2024-06-19 NOTE — TELEPHONE ENCOUNTER
ANTICOAGULATION     Dandy Sands is overdue for an INR check.     Spoke with Dandy and he reports he will have an INR done tomorrow    Holli Silva RN

## 2024-06-24 ENCOUNTER — ANTICOAGULATION THERAPY VISIT (OUTPATIENT)
Dept: ANTICOAGULATION | Facility: CLINIC | Age: 64
End: 2024-06-24
Payer: COMMERCIAL

## 2024-06-24 DIAGNOSIS — Z95.811 LVAD (LEFT VENTRICULAR ASSIST DEVICE) PRESENT (H): ICD-10-CM

## 2024-06-24 DIAGNOSIS — I50.20 HEART FAILURE WITH REDUCED EJECTION FRACTION, NYHA CLASS III (H): ICD-10-CM

## 2024-06-24 DIAGNOSIS — Z95.811 LEFT VENTRICULAR ASSIST DEVICE PRESENT (H): ICD-10-CM

## 2024-06-24 DIAGNOSIS — I50.22 CHRONIC SYSTOLIC CONGESTIVE HEART FAILURE (H): Primary | ICD-10-CM

## 2024-06-24 LAB — INR (EXTERNAL): 1.9

## 2024-06-24 NOTE — PROGRESS NOTES
ANTICOAGULATION MANAGEMENT     Dandy Sands 63 year old male is on warfarin with subtherapeutic INR result. (Goal INR 2.0-3.0)    Recent labs: (last 7 days)     06/20/24  0100   INR 1.9       ASSESSMENT     Source(s): Chart Review and Patient/Caregiver Call     Warfarin doses taken:  states he has been taking 5 mg daily since discharge from hospital on 6/14  Diet: No new diet changes identified  Medication/supplement changes:  24 hr iv micafungin, dalbavancin (every 2 week dosing) (no interactions with iv meds anticipated) bactrium decreased from bid to daily, (may have change in irn from decreased dose) restarted protonix, (may increase inr) holding lipitor and tylenol due to increase lfts  New illness, injury, or hospitalization: Yes: recent hospital stay for driveline infection  Signs or symptoms of bleeding or clotting: No  Previous result:  in hospital sub and therapetutic  Additional findings:  increase in LFTs       PLAN     Recommended plan for ongoing change(s) affecting INR     Dosing Instructions:  Continue 5 mg daily   with next INR in 4-5  days       Summary  As of 6/24/2024      Full warfarin instructions:  5 mg every day   Next INR check:  6/28/2024               Telephone call with Dandy who verbalizes understanding and agrees to plan    States he has dressing change on Thursday or Friday will have inr done at that appt    Education provided:   Please call back if any changes to your diet, medications or how you've been taking warfarin  Goal range and lab monitoring: goal range and significance of current result    Plan made with [facility] Pharmacist Pepper Sneed and per LVAD protocol    Karen Johnson RN  Anticoagulation Clinic  6/24/2024    _______________________________________________________________________     Anticoagulation Episode Summary       Current INR goal:  2.0-3.0   TTR:  67.6% (11.4 mo)   Target end date:  Indefinite   Send INR reminders to:  ANTICOAG LVAD    Indications    Chronic systolic  congestive heart failure (H) [I50.22]  LVAD (left ventricular assist device) present (H) [Z95.811]  Heart failure with reduced ejection fraction  NYHA class III (H) [I50.20]  Left ventricular assist device present (H) [Z95.811]             Comments:  Follow VAD Anticoag protocol:Yes: HeartMate 3   Bridging: No bridging epistaxis.   Date VAD placed: 7/8/22             Anticoagulation Care Providers       Provider Role Specialty Phone number    Kelly Lora MD Referring Cardiovascular Disease [phone]

## 2024-06-24 NOTE — PROGRESS NOTES
"ANTICOAGULATION MANAGEMENT     Dandy Sands 63 year old male is on warfarin with {Ther/Sub/Supra:670841} INR result. (Goal INR {Anticoag Goal:665455})    No results for input(s): \"INR\" in the last 168 hours.    ASSESSMENT     {accassessment:148166}       PLAN     {INRPLAN:305004}    "

## 2024-06-26 NOTE — PROGRESS NOTES
Chart reviewed and plan made with [facility] RN at time of encounter.    Hx of maintenance dose adjustment 5/2024- for Fluconazole course (pre-fluconazole dose 40 mg /week). Fluconazole stopped inpatient 6/7.     Elevated LFTs found while hospitalized. Remain elevated on discharge; may affect warfarin sensitivity    Continue as currently taking warfarin 5 mg daily based on 6/20 INR to evaluate at steady state.      Pepper Sneed, [facility]

## 2024-06-28 ENCOUNTER — ANTICOAGULATION THERAPY VISIT (OUTPATIENT)
Dept: ANTICOAGULATION | Facility: CLINIC | Age: 64
End: 2024-06-28
Payer: COMMERCIAL

## 2024-06-28 DIAGNOSIS — Z95.811 LVAD (LEFT VENTRICULAR ASSIST DEVICE) PRESENT (H): ICD-10-CM

## 2024-06-28 DIAGNOSIS — I50.20 HEART FAILURE WITH REDUCED EJECTION FRACTION, NYHA CLASS III (H): ICD-10-CM

## 2024-06-28 DIAGNOSIS — Z95.811 LEFT VENTRICULAR ASSIST DEVICE PRESENT (H): ICD-10-CM

## 2024-06-28 DIAGNOSIS — I50.22 CHRONIC SYSTOLIC CONGESTIVE HEART FAILURE (H): Primary | ICD-10-CM

## 2024-06-28 LAB — INR (EXTERNAL): 1.8

## 2024-06-28 NOTE — PROGRESS NOTES
ANTICOAGULATION MANAGEMENT     Dandy Sands 63 year old male is on warfarin with subtherapeutic INR result. (Goal INR 2.0-3.0)    Recent labs: (last 7 days)     06/27/24  0922   INR 1.8       ASSESSMENT     Source(s): Chart Review and Patient/Caregiver Call     Warfarin doses taken: Warfarin taken as instructed  Diet: No new diet changes identified  Medication/supplement changes:  Micafungin done this week, Dalbavancin will continue every 2 weeks, midline removed. May need oral antifungal   New illness, injury, or hospitalization: No  Signs or symptoms of bleeding or clotting: No  Previous result: Subtherapeutic  Additional findings:  Dandy aware to notify [facility] if restarted on oral antifungal. LFTs done yesterday continue elevated, tho slightly improved       PLAN     Recommended plan for ongoing change(s) affecting INR     Dosing Instructions: booster dose then Increase your warfarin dose (14.3% change) with next INR in 4 days       Summary  As of 6/28/2024      Full warfarin instructions:  6/28: 7.5 mg; Otherwise 7.5 mg every Wed, Sat; 5 mg all other days   Next INR check:  7/2/2024               Telephone call with Dandy who verbalizes understanding and agrees to plan and who agrees to plan and repeated back plan correctly    Patient using outside facility for labs    Education provided: Please call back if any changes to your diet, medications or how you've been taking warfarin  Goal range and lab monitoring: goal range and significance of current result    Plan made with [facility] Pharmacist Pepper Johnson, RN  Anticoagulation Clinic  6/28/2024    _______________________________________________________________________     Anticoagulation Episode Summary       Current INR goal:  2.0-3.0   TTR:  66.9% (11.5 mo)   Target end date:  Indefinite   Send INR reminders to:  ANTICOAG LVAD    Indications    Chronic systolic congestive heart failure (H) [I50.22]  LVAD (left ventricular assist device) present (H)  [Z95.811]  Heart failure with reduced ejection fraction  NYHA class III (H) [I50.20]  Left ventricular assist device present (H) [Z95.811]             Comments:  Follow VAD Anticoag protocol:Yes: HeartMate 3   Bridging: No bridging epistaxis.   Date VAD placed: 7/8/22             Anticoagulation Care Providers       Provider Role Specialty Phone number    Kelly Lora MD Referring Cardiovascular Disease [phone]

## 2024-07-03 ENCOUNTER — TELEPHONE (OUTPATIENT)
Dept: ANTICOAGULATION | Facility: CLINIC | Age: 64
End: 2024-07-03
Payer: COMMERCIAL

## 2024-07-03 NOTE — TELEPHONE ENCOUNTER
ANTICOAGULATION     Dandy Sands is overdue for an INR check.     Spoke with Dandy and he will go in for an INR check on 7/8/24    Holli Silva RN

## 2024-07-12 ENCOUNTER — ANTICOAGULATION THERAPY VISIT (OUTPATIENT)
Dept: ANTICOAGULATION | Facility: CLINIC | Age: 64
End: 2024-07-12

## 2024-07-12 DIAGNOSIS — Z95.811 LVAD (LEFT VENTRICULAR ASSIST DEVICE) PRESENT (H): ICD-10-CM

## 2024-07-12 DIAGNOSIS — I50.22 CHRONIC SYSTOLIC CONGESTIVE HEART FAILURE (H): Primary | ICD-10-CM

## 2024-07-12 DIAGNOSIS — I50.20 HEART FAILURE WITH REDUCED EJECTION FRACTION, NYHA CLASS III (H): ICD-10-CM

## 2024-07-12 DIAGNOSIS — Z95.811 LEFT VENTRICULAR ASSIST DEVICE PRESENT (H): ICD-10-CM

## 2024-07-12 LAB — INR (EXTERNAL): 1.7 (ref 0.9–1.1)

## 2024-07-12 NOTE — PROGRESS NOTES
ANTICOAGULATION MANAGEMENT     Dandy Sands 63 year old male is on warfarin with subtherapeutic INR result. (Goal INR 2.0-3.0)    Recent labs: (last 7 days)     07/12/24  0900   INR 1.7*       ASSESSMENT     Source(s): Chart Review and Patient/Caregiver Call     Warfarin doses taken: Missed dose(s) may be affecting INR - pt states he possibly missed 7/3  '50/50 chance'  Diet: No new diet changes identified  Medication/supplement changes: None noted  New illness, injury, or hospitalization: No  Signs or symptoms of bleeding or clotting: No  Previous result: Subtherapeutic  Additional findings: None       PLAN     Recommended plan for temporary change(s) affecting INR     Dosing Instructions: booster dose then Increase your warfarin dose (6.2% change) with next INR in 1 week       Summary  As of 7/12/2024      Full warfarin instructions:  7/12: 10 mg; Otherwise 7.5 mg every Sun, Tue, Fri; 5 mg all other days   Next INR check:  7/18/2024               Telephone call with Dandy who verbalizes understanding and agrees to plan and who agrees to plan and repeated back plan correctly    Patient using outside facility for labs    Education provided: Please call back if any changes to your diet, medications or how you've been taking warfarin    Plan made with [facility] Pharmacist Pepper Sneed and per LVAD protocol    Parul Nunez, RN  Anticoagulation Clinic  7/12/2024    _______________________________________________________________________     Anticoagulation Episode Summary       Current INR goal:  2.0-3.0   TTR:  66.7% (11.5 mo)   Target end date:  Indefinite   Send INR reminders to:  ANTICOAG LVAD    Indications    Chronic systolic congestive heart failure (H) [I50.22]  LVAD (left ventricular assist device) present (H) [Z95.811]  Heart failure with reduced ejection fraction  NYHA class III (H) [I50.20]  Left ventricular assist device present (H) [Z95.811]             Comments:  Follow VAD Anticoag protocol:Yes: HeartMate 3    Bridging: No bridging epistaxis.   Date VAD placed: 7/8/22             Anticoagulation Care Providers       Provider Role Specialty Phone number    Kelly Lora MD Referring Cardiovascular Disease [phone]

## 2024-07-22 ENCOUNTER — ANTICOAGULATION THERAPY VISIT (OUTPATIENT)
Dept: ANTICOAGULATION | Facility: CLINIC | Age: 64
End: 2024-07-22
Payer: COMMERCIAL

## 2024-07-22 DIAGNOSIS — Z95.811 LVAD (LEFT VENTRICULAR ASSIST DEVICE) PRESENT (H): ICD-10-CM

## 2024-07-22 DIAGNOSIS — Z95.811 LEFT VENTRICULAR ASSIST DEVICE PRESENT (H): ICD-10-CM

## 2024-07-22 DIAGNOSIS — I50.20 HEART FAILURE WITH REDUCED EJECTION FRACTION, NYHA CLASS III (H): ICD-10-CM

## 2024-07-22 DIAGNOSIS — I50.22 CHRONIC SYSTOLIC CONGESTIVE HEART FAILURE (H): Primary | ICD-10-CM

## 2024-07-22 LAB — INR (EXTERNAL): 2.1

## 2024-07-22 NOTE — PROGRESS NOTES
ANTICOAGULATION MANAGEMENT     Dandy Sands 63 year old male is on warfarin with therapeutic INR result. (Goal INR 2.0-3.0)    Recent labs: (last 7 days)     07/22/24  1140   INR 2.1       ASSESSMENT     Source(s): Chart Review     Warfarin doses taken: Reviewed in chart  Diet: No new diet changes identified  Medication/supplement changes: None noted  New illness, injury, or hospitalization: No  Signs or symptoms of bleeding or clotting: No  Previous result: Subtherapeutic-booster dose (possible missed doses) with 6.2% MD increase (previous INR sub)  Additional findings:     Outside ID OV 7/15/24:   For chronic driveline infection, plan for the following regimen at this time:  Continue on Dalbavancin infusions every 2 weeks, 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 Next due 6/28/24  Continue Bactrim DS once daily for suppression of Serratia  Will hold off on chronic antifungal suppression since uncertain if true infection to begin with and persistent abnormal LFTs, will monitor off therapy for now  Needs periodic CMP to assess LFTs, Creatinine, Potassium. Order placed with Dalbavancin infusions every 2 weeks.   Follow up with ID in ~2-4 weeks      PLAN     Recommended plan for no diet, medication or health factor changes affecting INR     Dosing Instructions: Continue your current warfarin dose with next INR in 1-2 weeks       Summary  As of 7/22/2024      Full warfarin instructions:  7.5 mg every Sun, Tue, Fri; 5 mg all other days   Next INR check:  8/5/2024               Detailed voice message left for Dandy with dosing instructions and follow up date.   Sent [employer] message with dosing and follow up instructions    Patient using outside facility for labs    Education provided: Please call back if any changes to your diet, medications or how you've been taking warfarin  Goal range and lab monitoring: goal range and significance of current result and Importance of  following up at instructed interval  Contact [phone] with any changes, questions or concerns.     Plan made per [facility] anticoagulation protocol and per LVAD protocol    LROENA MOSES RN  Anticoagulation Clinic  7/22/2024    _______________________________________________________________________     Anticoagulation Episode Summary       Current INR goal:  2.0-3.0   TTR:  66.4% (11.5 mo)   Target end date:  Indefinite   Send INR reminders to:  ANTICOAG LVAD    Indications    Chronic systolic congestive heart failure (H) [I50.22]  LVAD (left ventricular assist device) present (H) [Z95.811]  Heart failure with reduced ejection fraction  NYHA class III (H) [I50.20]  Left ventricular assist device present (H) [Z95.811]             Comments:  Follow VAD Anticoag protocol:Yes: HeartMate 3   Bridging: No bridging epistaxis.   Date VAD placed: 7/8/22             Anticoagulation Care Providers       Provider Role Specialty Phone number    Kelly Lora MD Referring Cardiovascular Disease [phone]

## 2024-07-26 ENCOUNTER — ANTICOAGULATION THERAPY VISIT (OUTPATIENT)
Dept: ANTICOAGULATION | Facility: CLINIC | Age: 64
End: 2024-07-26
Payer: COMMERCIAL

## 2024-07-26 DIAGNOSIS — I50.22 CHRONIC SYSTOLIC CONGESTIVE HEART FAILURE (H): Primary | ICD-10-CM

## 2024-07-26 DIAGNOSIS — I50.20 HEART FAILURE WITH REDUCED EJECTION FRACTION, NYHA CLASS III (H): ICD-10-CM

## 2024-07-26 DIAGNOSIS — Z95.811 LVAD (LEFT VENTRICULAR ASSIST DEVICE) PRESENT (H): ICD-10-CM

## 2024-07-26 DIAGNOSIS — Z95.811 LEFT VENTRICULAR ASSIST DEVICE PRESENT (H): ICD-10-CM

## 2024-07-26 LAB — INR (EXTERNAL): 1.8

## 2024-07-26 NOTE — PROGRESS NOTES
ANTICOAGULATION MANAGEMENT     Dandy Sands 63 year old male is on warfarin with subtherapeutic INR result. (Goal INR 2.0-3.0)    Recent labs: (last 7 days)     07/26/24  0917   INR 1.8       ASSESSMENT     Source(s): Chart Review and Patient/Caregiver Call     Warfarin doses taken: Warfarin taken as instructed  Diet: No new diet changes identified  Medication/supplement changes: None noted  New illness, injury, or hospitalization: No  Signs or symptoms of bleeding or clotting: No  Previous result: Subtherapeutic  Additional findings: None       PLAN     Recommended plan for no diet, medication or health factor changes affecting INR     Dosing Instructions: Increase your warfarin dose (5.9% change) with next INR in 1 week       Summary  As of 7/26/2024      Full warfarin instructions:  5 mg every Sun, Tue, Thu; 7.5 mg all other days   Next INR check:  8/2/2024               Telephone call with Dandy who agrees to plan and repeated back plan correctly    Patient using outside facility for labs    Education provided: Goal range and lab monitoring: goal range and significance of current result and Importance of following up at instructed interval  Contact [phone] with any changes, questions or concerns.     Plan made per [facility] anticoagulation protocol and per LVAD protocol    LORENA MOSES RN  Anticoagulation Clinic  7/26/2024    _______________________________________________________________________     Anticoagulation Episode Summary       Current INR goal:  2.0-3.0   TTR:  65.7% (11.5 mo)   Target end date:  Indefinite   Send INR reminders to:  ANTICOAG LVAD    Indications    Chronic systolic congestive heart failure (H) [I50.22]  LVAD (left ventricular assist device) present (H) [Z95.811]  Heart failure with reduced ejection fraction  NYHA class III (H) [I50.20]  Left ventricular assist device present (H) [Z95.811]             Comments:  Follow VAD Anticoag protocol:Yes: HeartMate 3   Bridging: No bridging  epistaxis.   Date VAD placed: 7/8/22             Anticoagulation Care Providers       Provider Role Specialty Phone number    Kelly Lora MD Referring Cardiovascular Disease [phone]

## 2024-08-05 ENCOUNTER — CARE COORDINATION (OUTPATIENT)
Dept: CARDIOLOGY | Facility: CLINIC | Age: 64
End: 2024-08-05
Payer: COMMERCIAL

## 2024-08-05 NOTE — PROGRESS NOTES
D: Patient paged on-call coordinator regarding lack of alarms on controller.     I/A: Had patient perform self test- could hear alarms but significantly quieter than normal. Endorsed all lights on controller and self test on screen. Had patient un plug one battery, could hear beeping from disconnected battery, controller states connect power. Beeping is quieter than normal. Patient endorses green light is on and bright. Patient states quieter alarms have been ongoing for months. But significantly changed from previous.     P: Discussed with patient it is our team's medical recommendation that he come here ASAP for evaluation, likely controller change. Explained to patient that he is at high risk for having an alarm that he does not hear which could be life threatening. Patient states he is unable to come until his appointment in October. Would be open to coming earlier if appointments can be scheduled earlier. Will discuss with primary coordinator Darline.  Patient notified to page on-call coordinator if symptoms worsen or with other concerns. Patient verbalized understanding.

## 2024-08-06 ENCOUNTER — MYC MEDICAL ADVICE (OUTPATIENT)
Dept: ANTICOAGULATION | Facility: CLINIC | Age: 64
End: 2024-08-06
Payer: COMMERCIAL

## 2024-08-09 ENCOUNTER — CARE COORDINATION (OUTPATIENT)
Dept: CARDIOLOGY | Facility: CLINIC | Age: 64
End: 2024-08-09
Payer: COMMERCIAL

## 2024-08-09 NOTE — PROGRESS NOTES
Called pt to check in s/p reports of controller audio dysfunction.     Patient's primary controller audio is significantly quieter than expected when on batteries. Per pt, audio is still significantly louder when connected to his MPU. Encouraged pt to take controller out its case during self-test and confirm that there is no visual debris in the speakers. Pt also notes that his white power cord does not alarm when disconnecting from battery, whereas his black power cord registers an alarm. Explained that speed of power source change can vary and will only alarm if time delay is exceeded. However, emphasized that it appears that controller may not be functioning as expected and is in urgent need of exchange.     Pt inquired about changing his own controller at home to his backup controller. Informed patient that changing a controller without the direction and guidance of a VAD coordinator is strictly contraindicated and could result in significant patient harm. Patient confirmed that he will not change his controller unless directed to by a VAD coordinator.     Encouraged patient to come to clinic to undergo VAD equipment assessment urgently, as inaudible VAD alarms can pose significant danger, including risk of patient harm and/or death. Patient is not able to financially afford to travel to [place] at this time; message sent to  to address possible resources to assist with travel costs. Patient insistent on waiting until his scheduled October cardiology appointment to address equipment concerns. Offered potential earlier cardiology appointment options; pt declined at this time.     Emphasized to patient that it is our program's formal recommendation to be seen in person by a VAD coordinator to assess VAD equipment as soon as possible. Patient declined recommended intervention despite re-education and confirms understanding of associated risks of declining.     Encouraged pt to have a low threshold to  page the VAD coordinator on call with any VAD-related questions or concerns.

## 2024-08-13 ENCOUNTER — ANTICOAGULATION THERAPY VISIT (OUTPATIENT)
Dept: ANTICOAGULATION | Facility: CLINIC | Age: 64
End: 2024-08-13
Payer: COMMERCIAL

## 2024-08-13 DIAGNOSIS — Z95.811 LEFT VENTRICULAR ASSIST DEVICE PRESENT (H): ICD-10-CM

## 2024-08-13 DIAGNOSIS — I50.20 HEART FAILURE WITH REDUCED EJECTION FRACTION, NYHA CLASS III (H): ICD-10-CM

## 2024-08-13 DIAGNOSIS — Z95.811 LVAD (LEFT VENTRICULAR ASSIST DEVICE) PRESENT (H): ICD-10-CM

## 2024-08-13 DIAGNOSIS — I50.22 CHRONIC SYSTOLIC CONGESTIVE HEART FAILURE (H): Primary | ICD-10-CM

## 2024-08-13 RX ORDER — WARFARIN SODIUM 2.5 MG/1
TABLET ORAL
Qty: 210 TABLET | Refills: 1 | Status: SHIPPED | OUTPATIENT
Start: 2024-08-13

## 2024-08-13 NOTE — PROGRESS NOTES
ANTICOAGULATION MANAGEMENT     Dandy Sands 63 year old male is on warfarin with subtherapeutic INR result. (Goal INR 2.0-3.0)    Recent labs: (last 7 days)     08/12/24  1125   INR 1.2*       ASSESSMENT     Source(s): Chart Review and Patient/Caregiver Call     Warfarin doses taken: Less warfarin taken than planned which may be affecting INR and Missed dose(s) may be affecting INR Pt states about a week ago he got his warfarin refilled and they gave him 2mg purple tablets. Since then, he has been taking 2 or 3 tablets per day (about a 20% decrease than he should be taking)   Diet: No new diet changes identified  Medication/supplement changes: None noted  New illness, injury, or hospitalization: No  Signs or symptoms of bleeding or clotting: No  Previous result: Subtherapeutic  Additional findings: States he will go back to taking his green 2.5mg tablets.        PLAN     Recommended plan for temporary change(s) affecting INR     Dosing Instructions: booster dose then continue your current warfarin dose with next INR in 4 days       Summary  As of 8/13/2024      Full warfarin instructions:  8/13: 12 mg; 8/14: 12 mg; Otherwise 5 mg every Sun, Tue, Thu; 7.5 mg all other days   Next INR check:  8/16/2024               Telephone call with Dandy who verbalizes understanding and agrees to plan and who agrees to plan and repeated back plan correctly    Patient using outside facility for labs    Education provided: Please call back if any changes to your diet, medications or how you've been taking warfarin    Plan made with [facility] Pharmacist Pepper Sneed and per LVAD protocol    Parul Nunez, RN  Anticoagulation Clinic  8/13/2024    _______________________________________________________________________     Anticoagulation Episode Summary       Current INR goal:  2.0-3.0   TTR:  65.1% (11.5 mo)   Target end date:  Indefinite   Send INR reminders to:  ANTICOAG LVAD    Indications    Chronic systolic congestive heart failure  (H) [I50.22]  LVAD (left ventricular assist device) present (H) [Z95.811]  Heart failure with reduced ejection fraction  NYHA class III (H) [I50.20]  Left ventricular assist device present (H) [Z95.811]             Comments:  Follow VAD Anticoag protocol:Yes: HeartMate 3   Bridging: No bridging epistaxis.   Date VAD placed: 7/8/22             Anticoagulation Care Providers       Provider Role Specialty Phone number    Kelly Lora MD Referring Cardiovascular Disease [phone]

## 2024-08-21 ENCOUNTER — MYC MEDICAL ADVICE (OUTPATIENT)
Dept: ANTICOAGULATION | Facility: CLINIC | Age: 64
End: 2024-08-21
Payer: COMMERCIAL

## 2024-08-23 ENCOUNTER — ANTICOAGULATION THERAPY VISIT (OUTPATIENT)
Dept: ANTICOAGULATION | Facility: CLINIC | Age: 64
End: 2024-08-23
Payer: COMMERCIAL

## 2024-08-23 DIAGNOSIS — I50.20 HEART FAILURE WITH REDUCED EJECTION FRACTION, NYHA CLASS III (H): ICD-10-CM

## 2024-08-23 DIAGNOSIS — Z95.811 LEFT VENTRICULAR ASSIST DEVICE PRESENT (H): ICD-10-CM

## 2024-08-23 DIAGNOSIS — Z95.811 LVAD (LEFT VENTRICULAR ASSIST DEVICE) PRESENT (H): ICD-10-CM

## 2024-08-23 DIAGNOSIS — I50.22 CHRONIC SYSTOLIC CONGESTIVE HEART FAILURE (H): Primary | ICD-10-CM

## 2024-08-23 LAB — INR (EXTERNAL): 2.3 (ref 0.9–1.1)

## 2024-08-23 NOTE — PROGRESS NOTES
ANTICOAGULATION MANAGEMENT     Dandy Sands 63 year old male is on warfarin with therapeutic INR result. (Goal INR 2.0-3.0)    Recent labs: (last 7 days)     08/23/24  1802   INR 2.3*       ASSESSMENT     Source(s): Chart Review  Previous INR was Subtherapeutic  Medication, diet, health changes since last INR chart reviewed; none identified         PLAN     Recommended plan for no diet, medication or health factor changes affecting INR     Dosing Instructions: Continue your current warfarin dose with next INR in 1 week       Summary  As of 8/23/2024      Full warfarin instructions:  5 mg every Sun, Tue, Thu; 7.5 mg all other days   Next INR check:  8/30/2024               Detailed voice message left for Dandy with dosing instructions and follow up date.   Sent [employer] message with dosing and follow up instructions    Patient using outside facility for labs    Education provided: Please call back if any changes to your diet, medications or how you've been taking warfarin    Plan made per [facility] anticoagulation protocol and per LVAD protocol    Chon Tinsley, RN  Anticoagulation Clinic  8/23/2024    _______________________________________________________________________     Anticoagulation Episode Summary       Current INR goal:  2.0-3.0   TTR:  64.4% (11.5 mo)   Target end date:  Indefinite   Send INR reminders to:  ANTICOAG LVAD    Indications    Chronic systolic congestive heart failure (H) [I50.22]  LVAD (left ventricular assist device) present (H) [Z95.811]  Heart failure with reduced ejection fraction  NYHA class III (H) [I50.20]  Left ventricular assist device present (H) [Z95.811]             Comments:  Follow VAD Anticoag protocol:Yes: HeartMate 3   Bridging: No bridging epistaxis.   Date VAD placed: 7/8/22             Anticoagulation Care Providers       Provider Role Specialty Phone number    Kelly Lora MD Referring Cardiovascular Disease [phone]

## 2024-08-28 ENCOUNTER — TELEPHONE (OUTPATIENT)
Dept: CARDIOLOGY | Facility: CLINIC | Age: 64
End: 2024-08-28
Payer: COMMERCIAL

## 2024-08-28 NOTE — TELEPHONE ENCOUNTER
8/28/2024 2:48PM Caitlin Guthrie  Patient confirmed rescheduled appointment:  Date: 10/2/2024  Time: 7:15AM  Visit type: Labs  Provider: Damien España and Brenda Perkins  Location: [address], 1st Floor [facility], [place] [address]  Testing/imaging: Return VAD w/ Valerie España (3rd Floor L&N) as previously scheduled at 8:20AM, Device Check (3rd Floor) at 10:30AM (only one available before 10:30AM was 7AM), and Return EP w/ Brenda Perkins (3rd Floor L&N) at 12:15PM  Additional notes: 8/28 Rescheduled 10/1 Return EP and lab appts to 10/2. Scheduled lab, device check, and Return EP w/ Brenda Wood Dempsey 10/2. SOLIS Guthrie 8/28/2024 2:48PM

## 2024-09-09 ENCOUNTER — ANTICOAGULATION THERAPY VISIT (OUTPATIENT)
Dept: ANTICOAGULATION | Facility: CLINIC | Age: 64
End: 2024-09-09

## 2024-09-09 DIAGNOSIS — I50.20 HEART FAILURE WITH REDUCED EJECTION FRACTION, NYHA CLASS III (H): ICD-10-CM

## 2024-09-09 DIAGNOSIS — I50.22 CHRONIC SYSTOLIC CONGESTIVE HEART FAILURE (H): Primary | ICD-10-CM

## 2024-09-09 DIAGNOSIS — Z95.811 LVAD (LEFT VENTRICULAR ASSIST DEVICE) PRESENT (H): ICD-10-CM

## 2024-09-09 DIAGNOSIS — Z95.811 LEFT VENTRICULAR ASSIST DEVICE PRESENT (H): ICD-10-CM

## 2024-09-09 NOTE — PROGRESS NOTES
ANTICOAGULATION MANAGEMENT     Dandy Sands 63 year old male is on warfarin with supratherapeutic INR result. (Goal INR 2.0-3.0)    Recent labs: (last 7 days)     09/06/24  0905   INR 3.3*       ASSESSMENT     Source(s): Chart Review and Patient/Caregiver Call     Warfarin doses taken: More warfarin taken than planned which may be affecting INR-Dandy reports he has been taking 5 mg TuTh and 7.5 mg ROW  Diet: No new diet changes identified  Medication/supplement changes: None noted  New illness, injury, or hospitalization: No  Signs or symptoms of bleeding or clotting: No  Previous result: Subtherapeutic  Additional findings:  Dandy states he will have next INR checked 9/20/24 when he has infusion.       PLAN     Recommended plan for temporary change(s) affecting INR     Dosing Instructions: Continue your current warfarin dose with next INR in 10 days  (this is less than what he has been taking for the last 2 weeks)  Left dose; but changed days he is taking 5 mg doses      Summary  As of 9/9/2024      Full warfarin instructions:  5 mg every Mon, Wed, Fri; 7.5 mg all other days   Next INR check:  9/20/2024               Telephone call with Dandy who verbalizes understanding and agrees to plan and who agrees to plan and repeated back plan correctly    Patient using outside facility for labs    Education provided: Symptom monitoring: monitoring for bleeding signs and symptoms and when to seek medical attention/emergency care    Plan made per [facility] anticoagulation protocol and per LVAD protocol    Leatha Abbott RN  Anticoagulation Clinic  9/9/2024    _______________________________________________________________________     Anticoagulation Episode Summary       Current INR goal:  2.0-3.0   TTR:  62.9% (11.4 mo)   Target end date:  Indefinite   Send INR reminders to:  ANTICOAG LVAD    Indications    Chronic systolic congestive heart failure (H) [I50.22]  LVAD (left ventricular assist device) present (H) [Z95.811]  Heart  failure with reduced ejection fraction  NYHA class III (H) [I50.20]  Left ventricular assist device present (H) [Z95.811]             Comments:  Follow VAD Anticoag protocol:Yes: HeartMate 3   Bridging: No bridging epistaxis.   Date VAD placed: 7/8/22             Anticoagulation Care Providers       Provider Role Specialty Phone number    Kelly Lora MD Referring Cardiovascular Disease [phone]

## 2024-09-20 ENCOUNTER — ANTICOAGULATION THERAPY VISIT (OUTPATIENT)
Dept: ANTICOAGULATION | Facility: CLINIC | Age: 64
End: 2024-09-20

## 2024-09-20 DIAGNOSIS — I50.20 HEART FAILURE WITH REDUCED EJECTION FRACTION, NYHA CLASS III (H): ICD-10-CM

## 2024-09-20 DIAGNOSIS — Z95.811 LVAD (LEFT VENTRICULAR ASSIST DEVICE) PRESENT (H): ICD-10-CM

## 2024-09-20 DIAGNOSIS — Z95.811 LEFT VENTRICULAR ASSIST DEVICE PRESENT (H): ICD-10-CM

## 2024-09-20 DIAGNOSIS — I50.22 CHRONIC SYSTOLIC CONGESTIVE HEART FAILURE (H): Primary | ICD-10-CM

## 2024-09-20 NOTE — PROGRESS NOTES
ANTICOAGULATION MANAGEMENT     Dandy Sands 63 year old male is on warfarin with therapeutic INR result. (Goal INR 2.0-3.0)    Recent labs: (last 7 days)     09/20/24  0916   INR 2.2*       ASSESSMENT     Source(s): Chart Review and Patient/Caregiver Call     Warfarin doses taken: Less warfarin taken than planned which may be affecting INR-- States he has been taking 7.5 Tuesdays & Thursdays, 5 ROW (10% less than documented dosing)  Diet: No new diet changes identified  Medication/supplement changes: None noted  New illness, injury, or hospitalization: No  Signs or symptoms of bleeding or clotting: No  Previous result: Supratherapeutic  Additional findings: Because Dandy had difficulty remembering dosing and is frequently mixing up the days, he states he's been taking 7.5 Tuesdays & Thursdays, 5 ROW - 10% less than documented dosing- Recommended him to continuing with 7.5 Tuesdays & Thursdays but add in Saturday as well and keep 5mg ROW (5% increase than he has been taking)       PLAN     Recommended plan for no diet, medication or health factor changes affecting INR     Dosing Instructions: Increase your warfarin dose (5.6.% change) with next INR in 1 week       Summary  As of 9/20/2024      Full warfarin instructions:  7.5 mg every Tue, Thu, Sat; 5 mg all other days   Next INR check:  9/26/2024               Telephone call with Dandy who verbalizes understanding and agrees to plan and who agrees to plan and repeated back plan correctly    Patient using outside facility for labs    Education provided: Please call back if any changes to your diet, medications or how you've been taking warfarin    Plan made per [facility] anticoagulation protocol and per LVAD protocol    Parul Nunez RN  9/20/2024  Anticoagulation Clinic  [employer] for routing messages: ollie PECK LVAD  [facility] patient phone line: [phone]        _______________________________________________________________________     Anticoagulation Episode Summary        Current INR goal:  2.0-3.0   TTR:  62.2% (11.5 mo)   Target end date:  Indefinite   Send INR reminders to:  ANTICOAG LVAD    Indications    Chronic systolic congestive heart failure (H) [I50.22]  LVAD (left ventricular assist device) present (H) [Z95.811]  Heart failure with reduced ejection fraction  NYHA class III (H) [I50.20]  Left ventricular assist device present (H) [Z95.811]             Comments:  Follow VAD Anticoag protocol:Yes: HeartMate 3   Bridging: No bridging epistaxis.   Date VAD placed: 7/8/22             Anticoagulation Care Providers       Provider Role Specialty Phone number    Kelly Lora MD Referring Cardiovascular Disease [phone]

## 2024-09-27 DIAGNOSIS — Z95.811 LVAD (LEFT VENTRICULAR ASSIST DEVICE) PRESENT (H): ICD-10-CM

## 2024-09-27 DIAGNOSIS — Z79.01 ANTICOAGULATED ON WARFARIN: ICD-10-CM

## 2024-09-27 DIAGNOSIS — I50.22 CHRONIC SYSTOLIC CONGESTIVE HEART FAILURE (H): Primary | ICD-10-CM

## 2024-09-30 NOTE — PROGRESS NOTES
"Situation:   Writer spoke with Patient today to obtain update on weights. Pt paged the VAD coordinator on call last week to inquire about his Lasix dosaging. Pt was to update this writer with weights/stop taking Lasix if patient is less that 154lbs and reassess plan with MD. Today, pt weighs 153lbs, but is hesitant to hold Lasix, as he feels much better with less volume and would like to take his Lasix as per usual.    Background:  Weight today: 153 lb. Compared to recent values this weight is decreased.     Assessment:  NIBP/MAP: 88 on 04/28  VAD parameters: Speed: 5100rpm's, Flow: 3.6l/min, PI: 5.9, Power: 3.8w  Symptoms:   Heart failure symptoms: None; pt notes he is \"feeling great\".   Symptoms are improving.   Alleviating and exacerbating factors: Pt had independently stopped taking Lasix. Restarted Lasix last week and responded well to medication.    Other concerning symptoms: Pt's MAP is still a little high; recommended that pt take his MAP again tomorrow to assess need for blood pressure control.         Recommendation:  Will discuss with Dr. Lora. Pt will plan to obtain repeat MAP tomorrow and update this writer with his weight again. Patient notified to page on-call coordinator if symptoms worsen or with other concerns. Patient verbalized understanding.             " Detail Level: Zone Detail Level: Detailed Detail Level: Generalized Detail Level: Simple

## 2024-10-01 NOTE — PROGRESS NOTES
ELECTROPHYSIOLOGY CLINIC VISIT    Assessment/Recommendations   Assessment/Plan:    Dandy Sands is a 63 year old male with past medical history significant for CAD s/p PCI, ICM LVEF 10-15%, s/p dual chamber CRTD 2006, s/p HM3 LVAD 7/28/22, drive line infection, SLE, antiphospholipid syndrome, HTN, HLD, and PE.     Chronic systolic heart failure, 2/2 to ICM s/p CRT-D (2006) s/p RV lead upgrade 1/13/23   s/p LVAD 7/8/12  GDMT management per VAD team as below:  1. ACEi/ARB/ARNi: Lisinopril increased to 15 mg in am and 10 mg in pm and hydralazine stopped today  2. BB: Deferred give recent LVAD implant with RV failure    3. Aldosterone antagonist: Deferred while optimizing medical therapy  4. STLG2i: defered given patients immunosuppression   5.  SCD prophylaxis: primary prevention CRT-D in 2006, increased RV thresholds noted with associated sensing issues. He was seen by Dr Maurice in clinic and they discussed RV lead replacement. Venogram with patent left subclavian vein, new RV lead was implanted by Dr Maurice on 1/13/23. Old RV lead was capped.    - Device check today with normal device function and stable lead trends   - Continue routine device follow up   6. Fluid status: euvolemic on exam   7. AT/AF: CHADSVASC 3 also requires warfarin for LVAD, On digoxin, no symptoms so will hold off on any rhythm control measures at that time.     Follow up in 1 year or sooner if need arises.      History of Present Illness/Subjective    Mr. Dandy Sands is a 63 year old male who comes in today for EP follow-up after RV lead revision.    Mr. Sands is a 63 year old male who has a past medical history significant for CAD s/p PCI, ICM LVEF 10-15%, s/p dual chamber CRTD 2006, s/p HM3 LVAD 7/28/22, drive line infection, SLE, antiphospholipid syndrome, HTN, HLD, and PE.     He has a longstanding history of CAD. He had PCI to LAD first in 2005. He had a STEMI in 2007. He has had subsequent ICM. He eventually had CRTD implanted in  2006 with gen change in 2012. He was admitted after having COVID-19 for respiratory distress. It was unclear whether this initially was related to pneumonia or decompensated heart failure. After a prolonged hospitalization and extensive testing it was felt that this was related to decompensated heart failure with progression of his MR. Due to persistent hypotension he was taken off most of his GDMT. He was seen HF who felt that the MR was the primary  of his decompensation.  He was eventually discharged with referral for mitraclip eval. He had coronary angiogram on 5/9/22 showing severe ostial LAD disease with in-stent restenosis of the mid LAD involving a diagonal bifurcation, mild LCx disease, and severe proximal RCA disease. LVEDP was 25.  TAVARES with anesthesia showed LVEF 30-35%, severely dilated LA, moderate to severe functional MR with multiple jets and apically tethered leaflets (he was hypotensive during the procedure SBP 70s). Due to his age, low LVEF, and multivessel CAD he was admitted that day for surgical evaluation.  CTS at [facility] felt he would benefit from CABG + MVR but that he should be at a center with ECMO/VAD back up.  He was readmitted on 5/27/22 with nausea, vomiting, and hypotension. A right heart catheterization which showed minimally elevated filling pressures and borderline cardiac index however no clear evidence of cardiogenic shock.  During the admission medications were adjusted, from Brilinta we will switch back to Plavix and ultimately his nausea resolved and was able to tolerate food before discharge.  He continued to have worsening HF issues and ultimately underwent LVAD in 7/28/22. His ICD lead was found to have low R wave amplitude post LVAD. His LVAD surgery was complicated by severe right ventricular failure requiring temporary RVAD.  He also had severe delirium and at one point tearing out his bridled nasogastric tube, causing a nasal septal perforation and severe  nosebleeding.  He was discharged to rehab and then we presented with severe C. difficile diarrhea, dehydration, low flow alarms, as well as COVID-19 infection. He saw Dr. Maurice in clinic who discussed management options with him. He elected to pursue RV lead revision/addition. He underwent implantation of a new RV lead on 1/13/23 with Dr Maurice.     EP Visit 8/24/23: He presents today for follow up after implantation of new RV lead. He reports feeling well. Incision site well healed. He denies any palpitations, abdominal fullness or peripheral edema, shortness of breath, paroxysmal nocturnal dyspnea or orthopnea. Device interrogation shows normal device function, stable lead trends, 4 hours AFL episode. Current cardiac medications include: Warfarin, ASA, lisinopril, hydralazine, digoxin and atorvastatin.     He presents today for follow up. He reports feeling well. No VAD alarms. Weight is stable. Labs today are stable. He denies chest discomfort, palpitations, abdominal fullness/bloating or peripheral edema, shortness of breath, paroxysmal nocturnal dyspnea, orthopnea, lightheadedness, dizziness, pre-syncope, or syncope. Device interrogation shows normal device function, stable lead parameters, 4 NSVT 2 seconds, 21.2% AF burden, AP 0.3%,  3%. Current cardiac medications include:  Warfarin, ASA, lisinopril, hydralazine, digoxin and atorvastatin.     I have reviewed and updated the patient's Past Medical History, Social History, Family History and Medication List.     Cardiographics (Personally Reviewed) :   Echo: 6/14/23    Left Ventricle: The left ventricle appears normal in size. Wall   thickness is normal. Severely reduced left ventricular systolic function.   The EF is visually estimated to be 15-20%. HeartMate III LVAD cannula   visualized. The aortic valve opens intermittently during the visualized   cardiac cycles. The LVAD inflow cannula is well seated at the apex.     Right Ventricle: The right ventricle  is within the upper limits of   normal in size. Right ventricle was not well visualized. Systolic function   is mildly reduced.     IVC/SVC: Normal IVC size with minimal respirophasic changes.     Aortic Valve: There is mild regurgitation.     RHC: 9/23/22  Hemodynamic data has been modified in [facility] per physician review.  Reduced cardiac output level.  Normal PA pressures.    Coronary Angiogram/RHC: 5/29/22  Right sided filling pressures are normal.  Mild elevated pulmonary hypertension.  Left sided filling pressures are mildly elevated.  Reduced cardiac output level.  Two vessel coronary artery disease including prior PCI of the RCA and LAD. There are no new hemodynamically significant lesions.       Physical Examination   BP (!) 84/0   Pulse 64   Wt 72.2 kg (159 lb 2.8 oz)   SpO2 100%   BMI 24.93 kg/m    Wt Readings from Last 3 Encounters:   06/13/24 64.6 kg (142 lb 8 oz)   12/14/23 72.6 kg (160 lb)   08/24/23 72.6 kg (160 lb)     General Appearance:   Alert, well-appearing and in no acute distress.   HEENT: Atraumatic, normocephalic. MMM.   Chest/Lungs:   Respirations unlabored.  Lungs are clear to auscultation.   Cardiovascular:   LVAD hum.     Abdomen:  Soft, nontender, nondistended.   Extremities: No cyanosis or clubbing. No edema.     Musculoskeletal: Moves all extremities.     Skin: Warm, dry, intact. Incision site well healed.    Neurologic: Mood and affect are appropriate.  Alert and oriented to person, place, time, and situation.          Medications  Allergies   Current Outpatient Medications   Medication Sig Dispense Refill    amoxicillin (AMOXIL) 500 MG capsule Take 4 capsules (2,000 mg) by mouth as needed (take 1 hour prior to any dental procedures) 4 capsule 3    aspirin (ASA) 81 MG EC tablet Take 1 tablet (81 mg) by mouth daily      dalbavancin (DALVANCE) 20 mg/mL Inject into the vein every 14 days      digoxin (LANOXIN) 125 MCG tablet Take 1 tablet (125 mcg) by mouth every evening 90 tablet 3     ferrous sulfate (FEROSUL) 325 (65 Fe) MG tablet Take 1 tablet (325 mg) by mouth every other day 15 tablet 3    furosemide (LASIX) 20 MG tablet Take 1 tablet (20 mg) by mouth daily as needed (as directed by cardiology) 30 tablet 3    gabapentin (NEURONTIN) 100 MG capsule TAKE 1 Capsule BY MOUTH EVERY MORNING, NOON & BEDTIME      hydroxychloroquine 300 MG TABS Take 300 mg by mouth daily 30 tablet 3    loperamide (IMODIUM) 2 MG capsule Take 1 capsule (2 mg) by mouth 2 times daily as needed for diarrhea 30 capsule 0    micafungin 100 mg Inject 100 mg into the vein every 24 hours 14 Dose 0    omeprazole (PRILOSEC) 40 MG DR capsule Take 40 mg by mouth 2 times daily      sulfamethoxazole-trimethoprim (BACTRIM DS) 800-160 MG tablet Take 1 tablet by mouth daily 90 tablet 3    tamsulosin (FLOMAX) 0.4 MG capsule Take 1 capsule (0.4 mg) by mouth daily 30 capsule 0    warfarin ANTICOAGULANT (COUMADIN) 2 MG tablet Take 3 mg daily or as directed. INR on Monday 6/17/24. 90 tablet 3    warfarin ANTICOAGULANT (COUMADIN) 2.5 MG tablet Take 2 tablets on Tuesdays & Thursdays. And 3 tablets all other days. OR AS DIRECTED BY ANTICOAGULATION CLINIC. 210 tablet 1     No current facility-administered medications for this visit.      No Known Allergies      Lab Results (Personally Reviewed)    Chemistry/lipid CBC Cardiac Enzymes/BNP/TSH/INR   Lab Results   Component Value Date    BUN 16.1 06/14/2024     06/14/2024    CO2 18 (L) 06/14/2024     Creatinine   Date Value Ref Range Status   06/14/2024 1.08 0.67 - 1.17 mg/dL Final       Lab Results   Component Value Date    CHOL 93 09/19/2022    HDL 43 09/19/2022    LDL 25 09/19/2022      Lab Results   Component Value Date    WBC 4.6 06/14/2024    HGB 8.5 (L) 06/14/2024    HCT 28.8 (L) 06/14/2024    MCV 74 (L) 06/14/2024     06/14/2024    Lab Results   Component Value Date    TSH 1.67 09/18/2022    INR 2.2 (H) 09/20/2024        The patient states understanding and is agreeable with  the plan.   YAO Lara CNP  Electrophysiology Consult Service  Securely message with [employer]   Text page via [facility] Paging/Directory

## 2024-10-02 ENCOUNTER — ANTICOAGULATION THERAPY VISIT (OUTPATIENT)
Dept: ANTICOAGULATION | Facility: CLINIC | Age: 64
End: 2024-10-02

## 2024-10-02 ENCOUNTER — ANCILLARY PROCEDURE (OUTPATIENT)
Dept: CARDIOLOGY | Facility: CLINIC | Age: 64
End: 2024-10-02
Attending: INTERNAL MEDICINE
Payer: MEDICARE

## 2024-10-02 ENCOUNTER — DOCUMENTATION ONLY (OUTPATIENT)
Dept: ANTICOAGULATION | Facility: CLINIC | Age: 64
End: 2024-10-02

## 2024-10-02 ENCOUNTER — OFFICE VISIT (OUTPATIENT)
Dept: CARDIOLOGY | Facility: CLINIC | Age: 64
End: 2024-10-02
Attending: NURSE PRACTITIONER
Payer: MEDICARE

## 2024-10-02 ENCOUNTER — LAB (OUTPATIENT)
Dept: LAB | Facility: CLINIC | Age: 64
End: 2024-10-02
Payer: MEDICARE

## 2024-10-02 VITALS
WEIGHT: 159.17 LBS | HEART RATE: 64 BPM | BODY MASS INDEX: 24.93 KG/M2 | SYSTOLIC BLOOD PRESSURE: 84 MMHG | OXYGEN SATURATION: 100 %

## 2024-10-02 VITALS
HEART RATE: 64 BPM | WEIGHT: 159.1 LBS | BODY MASS INDEX: 24.92 KG/M2 | SYSTOLIC BLOOD PRESSURE: 84 MMHG | OXYGEN SATURATION: 100 %

## 2024-10-02 DIAGNOSIS — Z95.811 LVAD (LEFT VENTRICULAR ASSIST DEVICE) PRESENT (H): ICD-10-CM

## 2024-10-02 DIAGNOSIS — I50.22 CHRONIC SYSTOLIC CONGESTIVE HEART FAILURE (H): Primary | ICD-10-CM

## 2024-10-02 DIAGNOSIS — D50.8 OTHER IRON DEFICIENCY ANEMIA: ICD-10-CM

## 2024-10-02 DIAGNOSIS — Z95.811 LEFT VENTRICULAR ASSIST DEVICE PRESENT (H): ICD-10-CM

## 2024-10-02 DIAGNOSIS — Z79.899 LONG TERM USE OF DRUG: ICD-10-CM

## 2024-10-02 DIAGNOSIS — I50.20 HEART FAILURE WITH REDUCED EJECTION FRACTION, NYHA CLASS III (H): ICD-10-CM

## 2024-10-02 DIAGNOSIS — Z95.810 ICD (IMPLANTABLE CARDIOVERTER-DEFIBRILLATOR) IN PLACE: Primary | ICD-10-CM

## 2024-10-02 DIAGNOSIS — I50.22 CHRONIC SYSTOLIC CONGESTIVE HEART FAILURE (H): ICD-10-CM

## 2024-10-02 DIAGNOSIS — B99.9 INFECTION: ICD-10-CM

## 2024-10-02 DIAGNOSIS — Z79.01 ANTICOAGULATED ON WARFARIN: ICD-10-CM

## 2024-10-02 DIAGNOSIS — T82.7XXA INFECTION ASSOCIATED WITH DRIVELINE OF LEFT VENTRICULAR ASSIST DEVICE (LVAD) (H): ICD-10-CM

## 2024-10-02 LAB
ALBUMIN SERPL BCG-MCNC: 4.5 G/DL (ref 3.5–5.2)
ALP SERPL-CCNC: 147 U/L (ref 40–150)
ALT SERPL W P-5'-P-CCNC: 69 U/L (ref 0–70)
ANION GAP SERPL CALCULATED.3IONS-SCNC: 13 MMOL/L (ref 7–15)
AST SERPL W P-5'-P-CCNC: 58 U/L (ref 0–45)
BILIRUB SERPL-MCNC: 0.7 MG/DL
BUN SERPL-MCNC: 22.6 MG/DL (ref 8–23)
CALCIUM SERPL-MCNC: 9.5 MG/DL (ref 8.8–10.4)
CHLORIDE SERPL-SCNC: 106 MMOL/L (ref 98–107)
CREAT SERPL-MCNC: 1.3 MG/DL (ref 0.67–1.17)
EGFRCR SERPLBLD CKD-EPI 2021: 62 ML/MIN/1.73M2
ERYTHROCYTE [DISTWIDTH] IN BLOOD BY AUTOMATED COUNT: 15.2 % (ref 10–15)
GLUCOSE SERPL-MCNC: 145 MG/DL (ref 70–99)
HCO3 SERPL-SCNC: 20 MMOL/L (ref 22–29)
HCT VFR BLD AUTO: 36.8 % (ref 40–53)
HGB BLD-MCNC: 12.5 G/DL (ref 13.3–17.7)
INR PPP: 1.88 (ref 0.85–1.15)
LDH SERPL L TO P-CCNC: 174 U/L (ref 0–250)
MCH RBC QN AUTO: 29.8 PG (ref 26.5–33)
MCHC RBC AUTO-ENTMCNC: 34 G/DL (ref 31.5–36.5)
MCV RBC AUTO: 88 FL (ref 78–100)
NT-PROBNP SERPL-MCNC: 683 PG/ML (ref 0–900)
PLATELET # BLD AUTO: 145 10E3/UL (ref 150–450)
POTASSIUM SERPL-SCNC: 3.7 MMOL/L (ref 3.4–5.3)
PROT SERPL-MCNC: 7.6 G/DL (ref 6.4–8.3)
RBC # BLD AUTO: 4.19 10E6/UL (ref 4.4–5.9)
SODIUM SERPL-SCNC: 139 MMOL/L (ref 135–145)
WBC # BLD AUTO: 5.2 10E3/UL (ref 4–11)

## 2024-10-02 PROCEDURE — 80053 COMPREHEN METABOLIC PANEL: CPT | Performed by: PATHOLOGY

## 2024-10-02 PROCEDURE — G2211 COMPLEX E/M VISIT ADD ON: HCPCS | Performed by: PHYSICIAN ASSISTANT

## 2024-10-02 PROCEDURE — 93283 PRGRMG EVAL IMPLANTABLE DFB: CPT | Performed by: INTERNAL MEDICINE

## 2024-10-02 PROCEDURE — G0463 HOSPITAL OUTPT CLINIC VISIT: HCPCS | Performed by: PHYSICIAN ASSISTANT

## 2024-10-02 PROCEDURE — 99215 OFFICE O/P EST HI 40 MIN: CPT | Performed by: PHYSICIAN ASSISTANT

## 2024-10-02 PROCEDURE — 99213 OFFICE O/P EST LOW 20 MIN: CPT | Mod: 25 | Performed by: NURSE PRACTITIONER

## 2024-10-02 PROCEDURE — G0463 HOSPITAL OUTPT CLINIC VISIT: HCPCS | Performed by: NURSE PRACTITIONER

## 2024-10-02 PROCEDURE — 85610 PROTHROMBIN TIME: CPT | Performed by: PATHOLOGY

## 2024-10-02 PROCEDURE — 36415 COLL VENOUS BLD VENIPUNCTURE: CPT | Performed by: PATHOLOGY

## 2024-10-02 PROCEDURE — 85027 COMPLETE CBC AUTOMATED: CPT | Performed by: PATHOLOGY

## 2024-10-02 PROCEDURE — 83615 LACTATE (LD) (LDH) ENZYME: CPT | Performed by: PATHOLOGY

## 2024-10-02 PROCEDURE — 83880 ASSAY OF NATRIURETIC PEPTIDE: CPT | Performed by: PATHOLOGY

## 2024-10-02 PROCEDURE — 93750 INTERROGATION VAD IN PERSON: CPT | Performed by: PHYSICIAN ASSISTANT

## 2024-10-02 RX ORDER — SULFAMETHOXAZOLE AND TRIMETHOPRIM 800; 160 MG/1; MG/1
1 TABLET ORAL DAILY
COMMUNITY
Start: 2024-10-02

## 2024-10-02 RX ORDER — AMOXICILLIN 500 MG/1
2000 CAPSULE ORAL PRN
COMMUNITY
Start: 2024-10-02

## 2024-10-02 ASSESSMENT — PAIN SCALES - GENERAL
PAINLEVEL: NO PAIN (0)
PAINLEVEL: NO PAIN (0)

## 2024-10-02 NOTE — PROGRESS NOTES
ANTICOAGULATION MANAGEMENT     Dandy Sands 63 year old male is on warfarin with subtherapeutic INR result. (Goal INR 2.0-3.0)    Recent labs: (last 7 days)     10/02/24  0724   INR 1.88*       ASSESSMENT     Source(s): Chart Review and Patient/Caregiver Call     Warfarin doses taken: Missed dose(s) may be affecting INR  Diet: No new diet changes identified  Medication/supplement changes: None noted  New illness, injury, or hospitalization: No  Signs or symptoms of bleeding or clotting: No  Previous result: Therapeutic last visit; previously outside of goal range  Additional findings: None and Patient will be seen at local clinic next week and will call if the [facility] has not seen the result on day of draw.       PLAN     Recommended plan for temporary change(s) affecting INR     Dosing Instructions: booster dose then continue your current warfarin dose with next INR in 1 week       Summary  As of 10/2/2024      Full warfarin instructions:  10/3: 10 mg; Otherwise 7.5 mg every Tue, Thu, Sat; 5 mg all other days   Next INR check:  10/10/2024               Telephone call with Dandy who verbalizes understanding and agrees to plan    Patient using outside facility for labs    Education provided: Please call back if any changes to your diet, medications or how you've been taking warfarin    Plan made per [facility] anticoagulation protocol and per LVAD protocol    Chon Tinsley, RN  10/3/2024  Anticoagulation Clinic  [employer] for routing messages: ollie ANTICOAG LVAD  [facility] patient phone line: [phone]        _______________________________________________________________________     Anticoagulation Episode Summary       Current INR goal:  2.0-3.0   TTR:  60.7% (11.5 mo)   Target end date:  Indefinite   Send INR reminders to:  [facility] LV[facility]    Indications    Chronic systolic congestive heart failure (H) [I50.22]  LVAD (left ventricular assist device) present (H) [Z95.811]  Heart failure with reduced ejection fraction  NYHA class  III (H) [I50.20]  Left ventricular assist device present (H) [Z95.811]             Comments:  Follow VAD Anticoag protocol:Yes: HeartMate 3   Bridging: No bridging epistaxis.   Date VAD placed: 7/8/22             Anticoagulation Care Providers       Provider Role Specialty Phone number    Kelly Lora MD Referring Cardiovascular Disease [phone]          ANTICOAGULATION MANAGEMENT     Dandy Sands 63 year old male is on warfarin with subtherapeutic INR result. (Goal INR 2.0-3.0)    Recent labs: (last 7 days)     10/02/24  0724   INR 1.88*       ASSESSMENT     Source(s): Chart Review  Previous INR was Therapeutic last visit; previously outside of goal range  Medication, diet, health changes since last INR   Per chart patient missed a dose of Warfarin  LVM for patient to take a booster dose one time today, then resume current maintenance dose.  10/2/24 Notes: Anticoagulation: warfarin INR goal 2-3, INR 1.88 today- dosing per coumadin clinic. Dandy will let them know he thinks he might have missed a dose a few days ago           PLAN     Unable to reach Dandy today.    Left message to take a booster dose of warfarin,  10 mg tonight. Request call back for assessment.    Follow up required to assess for changes  and confirm missed dose    Chon Tinsley, RN  10/2/2024  Anticoagulation Clinic  [employer] for routing messages: ollie PECK LVAD  [facility] patient phone line: [phone]

## 2024-10-02 NOTE — NURSING NOTE
Called Patient's Son Amos to update him that Dandy's new controller has low flow alarm of 2.5. Previous controller had a low flow alarm set at 2. Patients flows have been trending mid 3s. Instructed son and patient to call VAD coordinator on call if patient starts having low flow alarms.

## 2024-10-02 NOTE — PROGRESS NOTES
HPI:   Dandy Sands is a 63 year old male admitted on 6/7/2024 as a transfer from [place] where he was most recently admitted 5/31/2024 for weakness, malaise, and persistent diarrhea in setting of chronic driveline infection now transferred to [facility] for ongoing ZAKI cares. He has a past medical history significant for ICM s/p HM3 placement 7/8/2022 at [facility], CAD s/p PCI to LAD 2005, in-stent restenosis with TISHA to mLAD and prox RCA 05/24/2022, HLD, c. Diff 9/2022, SLE, antiphospholipid syndrome, chronic anemia, anxiety, and other conditions. Presents for LVAD follow-up.    He was hospitalized at [facility] this summer for acute on chronic diarrhea.     Today  He is doing the best he has been medically doing in a very long time. No more diarrhea!!! He is staying totally off dairy and also some antibiotHe still does get a little bit of lightheadedness, especially if MAP is <85. Breathing feels good. No LE edema, abdominal edema, orthopnea or pnd. Hasn't needed his lasix in a long time. No chest pain. No palpitations.     He continues to have a problem with the driveline adhesive and it gives him a rash. He still has intermittent driveline drainage although none right now. This is the best its been for a while. He changes the dressing every other day. They are using iodine. Stopped using skin prep and that did help. Stopped the adhesive remover.     No blood in the urine or blood in the stool. No nosebleeds    No headaches.     No vad alarms.    Cardiac Medications  Coumadin  ASA 81 mg daily  Digoxin 125 mcg daily  Dalbavancin   Bactrim DS daily    PAST MEDICAL HISTORY:  Past Medical History:   Diagnosis Date    Antiphospholipid antibody syndrome (H)     CAD (coronary artery disease)     Chronic systolic heart failure (H)     Ischemic cardiomyopathy     Mitral regurgitation     Systemic lupus erythematosus (H)        FAMILY HISTORY:  No family history on file.    SOCIAL HISTORY:  Social History     Socioeconomic History     Marital status: Single   Tobacco Use    Smoking status: Former    Smokeless tobacco: Never     Social Determinants of Health     Financial Resource Strain: Low Risk  (5/23/2024)    Received from [facility]    Overall Financial Resource Strain (CARDIA)     Difficulty of Paying Living Expenses: Not very hard   Food Insecurity: No Food Insecurity (5/23/2024)    Received from [facility]    Hunger Vital Sign     Worried About Running Out of Food in the Last Year: Never true     Ran Out of Food in the Last Year: Never true   Transportation Needs: No Transportation Needs (5/23/2024)    Received from [facility]    PRAPARE - Transportation     Lack of Transportation (Medical): No     Lack of Transportation (Non-Medical): No   Physical Activity: Insufficiently Active (6/27/2023)    Received from [facility], [facility]    Exercise Vital Sign     Days of Exercise per Week: 4 days     Minutes of Exercise per Session: 10 min   Stress: No Stress Concern Present (6/27/2023)    Received from [facility], [facility]    [employer] - Occupational Stress Questionnaire     Feeling of Stress : Only a little   Interpersonal Safety: Not At Risk (6/27/2023)    Received from [facility], [facility]    Humiliation, Afraid, Rape, and Kick questionnaire     Fear of Current or Ex-Partner: No     Emotionally Abused: No     Physically Abused: No     Sexually Abused: No   Housing Stability: High Risk (5/23/2024)    Received from [facility]    Housing Stability Vital Sign     Unable to Pay for Housing in the Last Year: No     Number of Times Moved in the Last Year: 2     Homeless in the Last Year: No       CURRENT MEDICATIONS:  Current Outpatient Medications   Medication Sig Dispense Refill    amoxicillin (AMOXIL) 500 MG capsule Take 4  capsules (2,000 mg) by mouth as needed (take 1 hour prior to any dental procedures).      aspirin (ASA) 81 MG EC tablet Take 1 tablet (81 mg) by mouth daily      dalbavancin (DALVANCE) 20 mg/mL Inject into the vein every 14 days      digoxin (LANOXIN) 125 MCG tablet Take 1 tablet (125 mcg) by mouth every evening 90 tablet 3    furosemide (LASIX) 20 MG tablet Take 1 tablet (20 mg) by mouth daily as needed (as directed by cardiology) 30 tablet 3    gabapentin (NEURONTIN) 100 MG capsule TAKE 1 Capsule BY MOUTH EVERY MORNING, NOON & BEDTIME      hydroxychloroquine 300 MG TABS Take 300 mg by mouth daily 30 tablet 3    omeprazole (PRILOSEC) 40 MG DR capsule Take 40 mg by mouth 2 times daily      sulfamethoxazole-trimethoprim (BACTRIM DS) 800-160 MG tablet Take 1 tablet by mouth daily.      tamsulosin (FLOMAX) 0.4 MG capsule Take 1 capsule (0.4 mg) by mouth daily 30 capsule 0    warfarin ANTICOAGULANT (COUMADIN) 2 MG tablet Take 3 mg daily or as directed. INR on Monday 6/17/24. 90 tablet 3    warfarin ANTICOAGULANT (COUMADIN) 2.5 MG tablet Take 2 tablets on Tuesdays & Thursdays. And 3 tablets all other days. OR AS DIRECTED BY ANTICOAGULATION CLINIC. 210 tablet 1    ferrous sulfate (FEROSUL) 325 (65 Fe) MG tablet Take 1 tablet (325 mg) by mouth every other day (Patient not taking: Reported on 10/2/2024) 15 tablet 3    loperamide (IMODIUM) 2 MG capsule Take 1 capsule (2 mg) by mouth 2 times daily as needed for diarrhea (Patient not taking: Reported on 10/2/2024) 30 capsule 0    micafungin 100 mg Inject 100 mg into the vein every 24 hours (Patient not taking: Reported on 10/2/2024) 14 Dose 0     No current facility-administered medications for this visit.       ROS:   See HPI    EXAM:  BP (!) 84/0 (BP Location: Right arm, Patient Position: Chair, Cuff Size: Adult Small)   Pulse 64   Wt 72.2 kg (159 lb 1.6 oz)   SpO2 100%   BMI 24.92 kg/m      GENERAL: Appears comfortable, in no distress   HEENT: Eye symmetrical and  without discharge   NECK: Supple, JVD <6.   CV: Hum of LVAD, no adventitious sounds  RESPIRATORY: Respirations regular, even, and unlabored. Lungs CTA throughout.   GI: Soft and non distended. No tenderness, rebound, guarding.   EXTREMITIES: No lower extremity peripheral edema. Non pulsatile.   NEUROLOGIC: Alert and interacting appropriately.   SKIN: No jaundice. No rashes or lesions. Driveline dressing c/d/I, but faint rash observable in some spots around where there has been tape/adhesive.    Labs - as reviewed in clinic with patient today:  CBC RESULTS:  Lab Results   Component Value Date    WBC 5.2 10/02/2024    RBC 4.19 (L) 10/02/2024    HGB 12.5 (L) 10/02/2024    HCT 36.8 (L) 10/02/2024    MCV 88 10/02/2024    MCH 29.8 10/02/2024    MCHC 34.0 10/02/2024    RDW 15.2 (H) 10/02/2024     (L) 10/02/2024       CMP RESULTS:  Lab Results   Component Value Date     10/02/2024    POTASSIUM 3.7 10/02/2024    POTASSIUM 4.3 10/27/2022    POTASSIUM 3.9 09/01/2022    POTASSIUM 5.2 07/09/2022    CHLORIDE 106 10/02/2024    CHLORIDE 107 12/21/2023    CO2 20 (L) 10/02/2024    CO2 22 09/01/2022    ANIONGAP 13 10/02/2024    ANIONGAP 6 09/01/2022     (H) 10/02/2024    GLC 82 03/17/2023    GLC 94 09/01/2022    BUN 22.6 10/02/2024    BUN 13 09/01/2022    CR 1.30 (H) 10/02/2024    GFRESTIMATED 62 10/02/2024    ELDA 9.5 10/02/2024    BILITOTAL 0.7 10/02/2024    ALBUMIN 4.5 10/02/2024    ALBUMIN 3.1 (L) 09/01/2022    ALKPHOS 147 10/02/2024    ALT 69 10/02/2024    AST 58 (H) 10/02/2024        INR RESULTS:  Lab Results   Component Value Date    INR 1.88 (H) 10/02/2024    INR 2.2 (H) 09/20/2024       Lab Results   Component Value Date    MAG 1.8 06/14/2024     Lab Results   Component Value Date    NTBNPI 999 (H) 06/07/2024     Lab Results   Component Value Date    NTBNP 683 10/02/2024     6/10/24 RHC  RA: 7 mmHg  RV: 18/6 mmHg  PA: 19/9/12 mmHg  PCWP: 1 mmHg  Marley CO/CI: 2.96/1.7  Thermo: CO/CI: 3.7/1.55  PVR: 4.06  KNOWLES  SVR: 1600 Right sided filling pressures are normal. Left sided filling pressures are normal. Normal PA pressures. Reduced cardiac output level. Basal HM3 LVAD Settings:  Speed 5100 rpm, Flow 4.1 L/min, PI 4.5, Power 3.6 persaud     6/7/24 ECHO  Mild left ventricular dilation is present. LVID:6.1 CM  Left ventricular function is decreased. The ejection fraction is <30%  (severely reduced). Septum midline.  Mild right ventricular dilation is present.  AV is closed. Mild aortic insufficiency is present.  Septum is midline.  Global right ventricular function is mild to moderately reduced.  LV outflow graft cannula flow and velocity is normal.    Assessment and Plan:   Dandy Sands is a 63 year old male admitted on 6/7/2024 as a transfer from [place] where he was most recently admitted 5/31/2024 for weakness, malaise, and persistent diarrhea in setting of chronic driveline infection now transferred to [facility] for ongoing ZAKI cares. He has a past medical history significant for ICM s/p HM3 placement 7/8/2022 at [facility], CAD s/p PCI to LAD 2005, in-stent restenosis with TISHA to mLAD and prox RCA 05/24/2022, HLD, c. Diff 9/2022, SLE, antiphospholipid syndrome, chronic anemia, anxiety, and other conditions. Presents for LVAD follow-up.    He is doing the best I have seen hm since implant. Mild hypovolemia- he will drink more PO fluids. Labs overall stable. Lfts continue to improve. His chronic driveline infection is under control right now. INR slightly low- he possibly missed a dose- he will discuss this A/C clinic. No leukocytosis. Anemia is  improved significantly.     We are working on finding a tape or dressing kit that works better for his skin. He will get a new controller today as well.    #s/p HM3 LVAD 7/08/2022 due to ICM  #s/p CRT-D (2006) s/p RV lead upgrade 1/13/23   #s/p LVAD 7/8/12  #CAD s/p PCI to LAD 2005, insent restenosis and TISHA mLAD, Prox RCA 05/2022     Speed: 5100, previously discussed decreasing d/t  elevated RA and very low pcw, but deferring in favor of digoxin for now, may need to consider in the future pending clinical course  RV support: digoxin 125 mcg daily  ACEi/ARB/ARNi: historically had been on lisinopril and hydralazine, now stopped due to dizziness and hypotension  BB: defer due to RV failures   SGLT2i: none currently, previous documentation reflects concern for immunosuppression and concern for infections/unclear evidence in LVAD patients  MRA: none, reportedly due to hypotension  Anticoagulation: warfarin INR goal 2-3, INR 1.88 today- dosing per coumadin clinic. Dandy will let them know he thinks he might have missed a dose a few days ago  ASA: not indicated  Volume status: euvolemic to mild hypovolemia. Encourage increased PO intake. He does have lasix available PRN, but hasn't needed in months    VAD interrogation October 2, 2024: VAD interrogation reviewed with VAD coordinator. Agree with findings. Rare PI events. No power spikes, speed drops, or other findings suspicious of pump malfunction noted. He did have quieter alarms so get got a new controller today.    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 Currently infection is well controlled with very liminted drainage. He is struggling more right now with rashes at the adhesive sites.   - S/p micafungin for yeast on wound culture- ended summer 2024.   - On Bactrim DS daily  - On Dalbavancin every other week  - LVAD coordinators discussing alternative tapes/dressing kits to help with the adhesive rash.    # Abnormal LFTs, improving  AST/ALT 100s to 300s since at least 06/01 at [place], on admission /. Previously thought related to congestive hepatopathy, however RA ~5. Bilirubin WNL, alk phos 237. RUQ ultrasound with mildly increased echogenicity of the hepatic parenchyma with possible hepatic steatosis. Also considering that this could be  "drug induced. Hepatology was consulted during 6/2024 admission and felt \"cause likely multifactorial with cholestasis of chronic illness with driveline infection, antibiotics causing transient increase/DILI, recent fluid volume overload in setting of HM3 and ICM contributing to hepatic congestion\"  - Off lipitor at this time- could consider adding this back at next appointment.  - LFTs continue to improve- AST remains above the upper limit of normal at 58, but continues to improve     # Diarrhea, chronic, resolved  History frequent diarrhea since being on antibiotics, which per his report he has been on nearly since time of placement. His most significant concern and what he finds most puzzling is that it typically goes away in the hospital and returns once home. He believes it may be due to dalbavancin although he did get a dose 05/31 and has not had diarrhea in some time so unclear. Diet is high in lactose and greasy food . No diarrhea since June 2024!  - GI consulted, luminal signed off  - STRICT no lactose diet    # C. Diff  Two admission for C. Diff 09/2022, negative 5/23/2024. No current diarrhea  - monitor     # HLD  - holding atorvastatin 40 mg PO daily as above- consider restarting with his next appointment      # SLE, antiphospholipid syndrome  - PTA hydroxychloroquine   - A/C as above    Follow up   - local cardiologist in 3 months  - Dr. Lora in 6 months         - I spent 5 minutes reviewing prior records and notes prior to the appointment, 29 minutes face to face with the patient and an additional 11 minutes coordinating care and completing documentation on the day of service    The longitudinal plan of care for the diagnosis(es)/condition(s) as documented were addressed during this visit. Due to the added complexity in care, I will continue to support Dandy in the subsequent management and with ongoing continuity of care.          Laila España PA-C  [facility]          CC  VICKY VITAL " K

## 2024-10-02 NOTE — NURSING NOTE
Chief Complaint   Patient presents with    Follow Up     Return VAD        Vitals were taken, medications reconciled.    Sonam Logan, Clinic Assistant   8:16 AM

## 2024-10-02 NOTE — PATIENT INSTRUCTIONS
It was a pleasure to see you in clinic today, please do not hesitate to call us for questions or concerns.  Your next automatic remote ICD check from home is scheduled for 1/2/2025.    Brenda Degroot RN    Electrophysiology Nurse Clinician  [facility]    During Business Hours Please Call:  [phone]  After Hours Please Call:  [phone] - select option #4 and ask for job code [id number]

## 2024-10-02 NOTE — PATIENT INSTRUCTIONS
" Ventricular Assist Device Clinic  Take your medications every day, as directed!  Medication changes made today:  No medication changes today Instructions:  Schedule an appointment for labs and Dr Lora in 6 months   Schedule an appointment for labs and Dr Morrison   Do not wash area around driveline with soap and water, continue using betadine, allow to dry, and do not wipe off. Do not use adhesive dressing that comes in kit, use white tape around gauze. PLace 2 squares of tape underneath driveline and use butterfly  top of tape.   For anchoring, apply tape to skin and use additional piece of tape over the driveline and apply to tape.  Monitor your heart failure, Page the VAD Coordinator on call:  If you gain more than 3 lb in a day or 5 in a week  If you feel worsening shortness of breath, palpitations, or swelling.   If you have VAD alarms or change in parameters  If you feel dizzy or lightheaded     Keep your VAD appointments!      Please see the clinic schedulers after your appointment to schedule follow-up. To contact the VAD , call [phone] To Page the VAD Coordinator on call, dial [phone] option #4 and ask to speak to the VAD coordinator on call. Try to maintain a heart healthy lifestyle!  Limit salt & sodium to 2000mg/day   Eat a heart healthy diet, low in saturated fats  Stay active! Aim to move at least 150 minutes every week.    Use [employer] allows you to communicate directly with your heart team through secure messaging.  [employer] can be accessed any time on your phone, computer, or tablet.  If you need assistance, we'd be happy to help!      Equipment Reminders:   Charge your back-up controller at least every 6 months. To charge, connect it to either batteries or wall power. The screen will read \"Charging\". Keep connected until the screen reads \"charging complete\". Disconnect power once the controller battery is charged. Also do a self-test when the controller is " connected to power.  Replace the AA batteries in your mobile power unit every 6 months.  Check your battery charger for when it is due for maintenance. It requires inspection in clinic once per year. There will be a sticker stating the month and year maintenance is due. When you bring your battery charger to clinic, tell one of the schedulers you have LVAD equipment that needs maintenance. They will call [facility]. You can leave your  under the LVAD sign by the  at the far end of clinic. You must drop it off before 2pm.

## 2024-10-02 NOTE — PROGRESS NOTES
ANTICOAGULATION  MANAGEMENT     Interacting Medication Review    Interacting medication(s): Sulfamethoxazole-Trimethoprim (Bactrim) DS 1 tab daily with warfarin.    Duration: Long-term, has been on this dose since 6/12/24 per chart review    Indication:  Chronic driveline infection    New medication?: No, long term medication. No affect on INR anticipated at this time.        PLAN     No adjustment to anticoagulation plan needed; no further interaction anticipated with stable long term medication         Patient was not contacted    No adjustment to anticoagulation calendar was required    No change to [facility] priority; patient stable on interacting medication    Plan made per [facility] anticoagulation protocol and per LVAD protocol    Karen Benjamin RN  10/2/2024  Anticoagulation Clinic  embraase for routing messages: ollie PECK LVAD  [facility] patient phone line: [phone]

## 2024-10-02 NOTE — LETTER
10/2/2024      RE: Dandy Sands  [address]  [facility] [address]       Dear Colleague,    Thank you for the opportunity to participate in the care of your patient, Dandy Sands, at the [facility] [place] [facility]. Please see a copy of my visit note below.        ELECTROPHYSIOLOGY CLINIC VISIT    Assessment/Recommendations   Assessment/Plan:    Dandy Sands is a 63 year old male with past medical history significant for CAD s/p PCI, ICM LVEF 10-15%, s/p dual chamber CRTD 2006, s/p HM3 LVAD 7/28/22, drive line infection, SLE, antiphospholipid syndrome, HTN, HLD, and PE.     Chronic systolic heart failure, 2/2 to ICM s/p CRT-D (2006) s/p RV lead upgrade 1/13/23   s/p LVAD 7/8/12  GDMT management per VAD team as below:  1. ACEi/ARB/ARNi: Lisinopril increased to 15 mg in am and 10 mg in pm and hydralazine stopped today  2. BB: Deferred give recent LVAD implant with RV failure    3. Aldosterone antagonist: Deferred while optimizing medical therapy  4. STLG2i: defered given patients immunosuppression   5.  SCD prophylaxis: primary prevention CRT-D in 2006, increased RV thresholds noted with associated sensing issues. He was seen by Dr Maurice in clinic and they discussed RV lead replacement. Venogram with patent left subclavian vein, new RV lead was implanted by Dr Maurice on 1/13/23. Old RV lead was capped.    - Device check today with normal device function and stable lead trends   - Continue routine device follow up   6. Fluid status: euvolemic on exam   7. AT/AF: CHADSVASC 3 also requires warfarin for LVAD, On digoxin, no symptoms so will hold off on any rhythm control measures at that time.     Follow up in 1 year or sooner if need arises.      History of Present Illness/Subjective    Mr. Dandy Sands is a 63 year old male who comes in today for EP follow-up after RV lead revision.    Mr. Sands is a 63 year old male who has a past  medical history significant for CAD s/p PCI, ICM LVEF 10-15%, s/p dual chamber CRTD 2006, s/p HM3 LVAD 7/28/22, drive line infection, SLE, antiphospholipid syndrome, HTN, HLD, and PE.     He has a longstanding history of CAD. He had PCI to LAD first in 2005. He had a STEMI in 2007. He has had subsequent ICM. He eventually had CRTD implanted in 2006 with gen change in 2012. He was admitted after having COVID-19 for respiratory distress. It was unclear whether this initially was related to pneumonia or decompensated heart failure. After a prolonged hospitalization and extensive testing it was felt that this was related to decompensated heart failure with progression of his MR. Due to persistent hypotension he was taken off most of his GDMT. He was seen HF who felt that the MR was the primary  of his decompensation.  He was eventually discharged with referral for mitraclip eval. He had coronary angiogram on 5/9/22 showing severe ostial LAD disease with in-stent restenosis of the mid LAD involving a diagonal bifurcation, mild LCx disease, and severe proximal RCA disease. LVEDP was 25.  TAVARES with anesthesia showed LVEF 30-35%, severely dilated LA, moderate to severe functional MR with multiple jets and apically tethered leaflets (he was hypotensive during the procedure SBP 70s). Due to his age, low LVEF, and multivessel CAD he was admitted that day for surgical evaluation.  CTS at [place] felt he would benefit from CABG + MVR but that he should be at a center with ECMO/VAD back up.  He was readmitted on 5/27/22 with nausea, vomiting, and hypotension. A right heart catheterization which showed minimally elevated filling pressures and borderline cardiac index however no clear evidence of cardiogenic shock.  During the admission medications were adjusted, from Brilinta we will switch back to Plavix and ultimately his nausea resolved and was able to tolerate food before discharge.  He continued to have worsening HF issues  and ultimately underwent LVAD in 7/28/22. His ICD lead was found to have low R wave amplitude post LVAD. His LVAD surgery was complicated by severe right ventricular failure requiring temporary RVAD.  He also had severe delirium and at one point tearing out his bridled nasogastric tube, causing a nasal septal perforation and severe nosebleeding.  He was discharged to rehab and then we presented with severe C. difficile diarrhea, dehydration, low flow alarms, as well as COVID-19 infection. He saw Dr. Maurice in clinic who discussed management options with him. He elected to pursue RV lead revision/addition. He underwent implantation of a new RV lead on 1/13/23 with Dr Maurice.     EP Visit 8/24/23: He presents today for follow up after implantation of new RV lead. He reports feeling well. Incision site well healed. He denies any palpitations, abdominal fullness or peripheral edema, shortness of breath, paroxysmal nocturnal dyspnea or orthopnea. Device interrogation shows normal device function, stable lead trends, 4 hours AFL episode. Current cardiac medications include: Warfarin, ASA, lisinopril, hydralazine, digoxin and atorvastatin.     He presents today for follow up. He reports feeling well. No VAD alarms. Weight is stable. Labs today are stable. He denies chest discomfort, palpitations, abdominal fullness/bloating or peripheral edema, shortness of breath, paroxysmal nocturnal dyspnea, orthopnea, lightheadedness, dizziness, pre-syncope, or syncope. Device interrogation shows normal device function, stable lead parameters, 4 NSVT 2 seconds, 21.2% AF burden, AP 0.3%,  3%. Current cardiac medications include:  Warfarin, ASA, lisinopril, hydralazine, digoxin and atorvastatin.     I have reviewed and updated the patient's Past Medical History, Social History, Family History and Medication List.     Cardiographics (Personally Reviewed) :   Echo: 6/14/23     Left Ventricle: The left ventricle appears normal in size.  Wall   thickness is normal. Severely reduced left ventricular systolic function.   The EF is visually estimated to be 15-20%. HeartMate III LVAD cannula   visualized. The aortic valve opens intermittently during the visualized   cardiac cycles. The LVAD inflow cannula is well seated at the apex.      Right Ventricle: The right ventricle is within the upper limits of   normal in size. Right ventricle was not well visualized. Systolic function   is mildly reduced.      IVC/SVC: Normal IVC size with minimal respirophasic changes.      Aortic Valve: There is mild regurgitation.     RHC: 9/23/22  Hemodynamic data has been modified in [facility] per physician review.  Reduced cardiac output level.  Normal PA pressures.    Coronary Angiogram/RHC: 5/29/22  Right sided filling pressures are normal.  Mild elevated pulmonary hypertension.  Left sided filling pressures are mildly elevated.  Reduced cardiac output level.  Two vessel coronary artery disease including prior PCI of the RCA and LAD. There are no new hemodynamically significant lesions.       Physical Examination   BP (!) 84/0   Pulse 64   Wt 72.2 kg (159 lb 2.8 oz)   SpO2 100%   BMI 24.93 kg/m    Wt Readings from Last 3 Encounters:   06/13/24 64.6 kg (142 lb 8 oz)   12/14/23 72.6 kg (160 lb)   08/24/23 72.6 kg (160 lb)     General Appearance:   Alert, well-appearing and in no acute distress.   HEENT: Atraumatic, normocephalic. MMM.   Chest/Lungs:   Respirations unlabored.  Lungs are clear to auscultation.   Cardiovascular:   LVAD hum.     Abdomen:  Soft, nontender, nondistended.   Extremities: No cyanosis or clubbing. No edema.     Musculoskeletal: Moves all extremities.     Skin: Warm, dry, intact. Incision site well healed.    Neurologic: Mood and affect are appropriate.  Alert and oriented to person, place, time, and situation.          Medications  Allergies   Current Outpatient Medications   Medication Sig Dispense Refill     amoxicillin (AMOXIL) 500 MG capsule  Take 4 capsules (2,000 mg) by mouth as needed (take 1 hour prior to any dental procedures) 4 capsule 3     aspirin (ASA) 81 MG EC tablet Take 1 tablet (81 mg) by mouth daily       dalbavancin (DALVANCE) 20 mg/mL Inject into the vein every 14 days       digoxin (LANOXIN) 125 MCG tablet Take 1 tablet (125 mcg) by mouth every evening 90 tablet 3     ferrous sulfate (FEROSUL) 325 (65 Fe) MG tablet Take 1 tablet (325 mg) by mouth every other day 15 tablet 3     furosemide (LASIX) 20 MG tablet Take 1 tablet (20 mg) by mouth daily as needed (as directed by cardiology) 30 tablet 3     gabapentin (NEURONTIN) 100 MG capsule TAKE 1 Capsule BY MOUTH EVERY MORNING, NOON & BEDTIME       hydroxychloroquine 300 MG TABS Take 300 mg by mouth daily 30 tablet 3     loperamide (IMODIUM) 2 MG capsule Take 1 capsule (2 mg) by mouth 2 times daily as needed for diarrhea 30 capsule 0     micafungin 100 mg Inject 100 mg into the vein every 24 hours 14 Dose 0     omeprazole (PRILOSEC) 40 MG DR capsule Take 40 mg by mouth 2 times daily       sulfamethoxazole-trimethoprim (BACTRIM DS) 800-160 MG tablet Take 1 tablet by mouth daily 90 tablet 3     tamsulosin (FLOMAX) 0.4 MG capsule Take 1 capsule (0.4 mg) by mouth daily 30 capsule 0     warfarin ANTICOAGULANT (COUMADIN) 2 MG tablet Take 3 mg daily or as directed. INR on Monday 6/17/24. 90 tablet 3     warfarin ANTICOAGULANT (COUMADIN) 2.5 MG tablet Take 2 tablets on Tuesdays & Thursdays. And 3 tablets all other days. OR AS DIRECTED BY ANTICOAGULATION CLINIC. 210 tablet 1     No current facility-administered medications for this visit.      No Known Allergies      Lab Results (Personally Reviewed)    Chemistry/lipid CBC Cardiac Enzymes/BNP/TSH/INR   Lab Results   Component Value Date    BUN 16.1 06/14/2024     06/14/2024    CO2 18 (L) 06/14/2024     Creatinine   Date Value Ref Range Status   06/14/2024 1.08 0.67 - 1.17 mg/dL Final       Lab Results   Component Value Date    CHOL 93  09/19/2022    HDL 43 09/19/2022    LDL 25 09/19/2022      Lab Results   Component Value Date    WBC 4.6 06/14/2024    HGB 8.5 (L) 06/14/2024    HCT 28.8 (L) 06/14/2024    MCV 74 (L) 06/14/2024     06/14/2024    Lab Results   Component Value Date    TSH 1.67 09/18/2022    INR 2.2 (H) 09/20/2024        The patient states understanding and is agreeable with the plan.   YAO Lara CNP  Electrophysiology Consult Service  Securely message with [employer]   Text page via [facility] Paging/Directory                        Please do not hesitate to contact me if you have any questions/concerns.     Sincerely,     YAO Barnes CNP

## 2024-10-02 NOTE — LETTER
10/2/2024      RE: Dandy Sands  [address]  [facility] [address]       Dear Colleague,    Thank you for the opportunity to participate in the care of your patient, Dandy Sands, at the [facility] HEART [facility] at [facility]. Please see a copy of my visit note below.    HPI:   Dandy Sands is a 63 year old male admitted on 6/7/2024 as a transfer from [place] where he was most recently admitted 5/31/2024 for weakness, malaise, and persistent diarrhea in setting of chronic driveline infection now transferred to [facility] for ongoing ZAKI cares. He has a past medical history significant for ICM s/p HM3 placement 7/8/2022 at [facility], CAD s/p PCI to LAD 2005, in-stent restenosis with TISHA to mLAD and prox RCA 05/24/2022, HLD, c. Diff 9/2022, SLE, antiphospholipid syndrome, chronic anemia, anxiety, and other conditions. Presents for LVAD follow-up.    He was hospitalized at [facility] this summer for acute on chronic diarrhea.     Today  He is doing the best he has been medically doing in a very long time. No more diarrhea!!! He is staying totally off dairy and also some antibiotHe still does get a little bit of lightheadedness, especially if MAP is <85. Breathing feels good. No LE edema, abdominal edema, orthopnea or pnd. Hasn't needed his lasix in a long time. No chest pain. No palpitations.     He continues to have a problem with the driveline adhesive and it gives him a rash. He still has intermittent driveline drainage although none right now. This is the best its been for a while. He changes the dressing every other day. They are using iodine. Stopped using skin prep and that did help. Stopped the adhesive remover.     No blood in the urine or blood in the stool. No nosebleeds    No headaches.     No vad alarms.    Cardiac Medications  Coumadin  ASA 81 mg daily  Digoxin 125 mcg daily  Dalbavancin   Bactrim DS daily    PAST MEDICAL HISTORY:  Past Medical  History:   Diagnosis Date     Antiphospholipid antibody syndrome (H)      CAD (coronary artery disease)      Chronic systolic heart failure (H)      Ischemic cardiomyopathy      Mitral regurgitation      Systemic lupus erythematosus (H)        FAMILY HISTORY:  No family history on file.    SOCIAL HISTORY:  Social History     Socioeconomic History     Marital status: Single   Tobacco Use     Smoking status: Former     Smokeless tobacco: Never     Social Determinants of Health     Financial Resource Strain: Low Risk  (5/23/2024)    Received from [facility]    Overall Financial Resource Strain (CARDIA)      Difficulty of Paying Living Expenses: Not very hard   Food Insecurity: No Food Insecurity (5/23/2024)    Received from [facility]    Hunger Vital Sign      Worried About Running Out of Food in the Last Year: Never true      Ran Out of Food in the Last Year: Never true   Transportation Needs: No Transportation Needs (5/23/2024)    Received from [facility]    PRAPARE - Transportation      Lack of Transportation (Medical): No      Lack of Transportation (Non-Medical): No   Physical Activity: Insufficiently Active (6/27/2023)    [facility], [facility]    Exercise Vital Sign      Days of Exercise per Week: 4 days      Minutes of Exercise per Session: 10 min   Stress: No Stress Concern Present (6/27/2023)    [facility] from [facility], [facility]    [employer] [place] [employer] - Occupational Stress Questionnaire      Feeling of Stress : Only a little   Interpersonal Safety: Not At Risk (6/27/2023)    Received from [facility], [facility]    Humiliation, Afraid, Rape, and Kick questionnaire      Fear of Current or Ex-Partner: No      Emotionally Abused: No      Physically Abused: No      Sexually Abused: No   Housing Stability:  High Risk (5/23/2024)    Received from [facility]    Housing Stability Vital Sign      Unable to Pay for Housing in the Last Year: No      Number of Times Moved in the Last Year: 2      Homeless in the Last Year: No       CURRENT MEDICATIONS:  Current Outpatient Medications   Medication Sig Dispense Refill     amoxicillin (AMOXIL) 500 MG capsule Take 4 capsules (2,000 mg) by mouth as needed (take 1 hour prior to any dental procedures).       aspirin (ASA) 81 MG EC tablet Take 1 tablet (81 mg) by mouth daily       dalbavancin (DALVANCE) 20 mg/mL Inject into the vein every 14 days       digoxin (LANOXIN) 125 MCG tablet Take 1 tablet (125 mcg) by mouth every evening 90 tablet 3     furosemide (LASIX) 20 MG tablet Take 1 tablet (20 mg) by mouth daily as needed (as directed by cardiology) 30 tablet 3     gabapentin (NEURONTIN) 100 MG capsule TAKE 1 Capsule BY MOUTH EVERY MORNING, NOON & BEDTIME       hydroxychloroquine 300 MG TABS Take 300 mg by mouth daily 30 tablet 3     omeprazole (PRILOSEC) 40 MG DR capsule Take 40 mg by mouth 2 times daily       sulfamethoxazole-trimethoprim (BACTRIM DS) 800-160 MG tablet Take 1 tablet by mouth daily.       tamsulosin (FLOMAX) 0.4 MG capsule Take 1 capsule (0.4 mg) by mouth daily 30 capsule 0     warfarin ANTICOAGULANT (COUMADIN) 2 MG tablet Take 3 mg daily or as directed. INR on Monday 6/17/24. 90 tablet 3     warfarin ANTICOAGULANT (COUMADIN) 2.5 MG tablet Take 2 tablets on Tuesdays & Thursdays. And 3 tablets all other days. OR AS DIRECTED BY ANTICOAGULATION CLINIC. 210 tablet 1     ferrous sulfate (FEROSUL) 325 (65 Fe) MG tablet Take 1 tablet (325 mg) by mouth every other day (Patient not taking: Reported on 10/2/2024) 15 tablet 3     loperamide (IMODIUM) 2 MG capsule Take 1 capsule (2 mg) by mouth 2 times daily as needed for diarrhea (Patient not taking: Reported on 10/2/2024) 30 capsule 0     micafungin 100 mg Inject 100 mg into the vein every 24 hours  (Patient not taking: Reported on 10/2/2024) 14 Dose 0     No current facility-administered medications for this visit.       ROS:   See HPI    EXAM:  BP (!) 84/0 (BP Location: Right arm, Patient Position: Chair, Cuff Size: Adult Small)   Pulse 64   Wt 72.2 kg (159 lb 1.6 oz)   SpO2 100%   BMI 24.92 kg/m      GENERAL: Appears comfortable, in no distress   HEENT: Eye symmetrical and without discharge   NECK: Supple, JVD <6.   CV: Hum of LVAD, no adventitious sounds  RESPIRATORY: Respirations regular, even, and unlabored. Lungs CTA throughout.   GI: Soft and non distended. No tenderness, rebound, guarding.   EXTREMITIES: No lower extremity peripheral edema. Non pulsatile.   NEUROLOGIC: Alert and interacting appropriately.   SKIN: No jaundice. No rashes or lesions. Driveline dressing c/d/I, but faint rash observable in some spots around where there has been tape/adhesive.    Labs - as reviewed in clinic with patient today:  CBC RESULTS:  Lab Results   Component Value Date    WBC 5.2 10/02/2024    RBC 4.19 (L) 10/02/2024    HGB 12.5 (L) 10/02/2024    HCT 36.8 (L) 10/02/2024    MCV 88 10/02/2024    MCH 29.8 10/02/2024    MCHC 34.0 10/02/2024    RDW 15.2 (H) 10/02/2024     (L) 10/02/2024       CMP RESULTS:  Lab Results   Component Value Date     10/02/2024    POTASSIUM 3.7 10/02/2024    POTASSIUM 4.3 10/27/2022    POTASSIUM 3.9 09/01/2022    POTASSIUM 5.2 07/09/2022    CHLORIDE 106 10/02/2024    CHLORIDE 107 12/21/2023    CO2 20 (L) 10/02/2024    CO2 22 09/01/2022    ANIONGAP 13 10/02/2024    ANIONGAP 6 09/01/2022     (H) 10/02/2024    GLC 82 03/17/2023    GLC 94 09/01/2022    BUN 22.6 10/02/2024    BUN 13 09/01/2022    CR 1.30 (H) 10/02/2024    GFRESTIMATED 62 10/02/2024    ELDA 9.5 10/02/2024    BILITOTAL 0.7 10/02/2024    ALBUMIN 4.5 10/02/2024    ALBUMIN 3.1 (L) 09/01/2022    ALKPHOS 147 10/02/2024    ALT 69 10/02/2024    AST 58 (H) 10/02/2024        INR RESULTS:  Lab Results   Component Value  Date    INR 1.88 (H) 10/02/2024    INR 2.2 (H) 09/20/2024       Lab Results   Component Value Date    MAG 1.8 06/14/2024     Lab Results   Component Value Date    NTBNPI 999 (H) 06/07/2024     Lab Results   Component Value Date    NTBNP 683 10/02/2024     6/10/24 RHC  RA: 7 mmHg  RV: 18/6 mmHg  PA: 19/9/12 mmHg  PCWP: 1 mmHg  Marley CO/CI: 2.96/1.7  Thermo: CO/CI: 3.7/1.55  PVR: 4.06 KNOWLES  SVR: 1600 Right sided filling pressures are normal. Left sided filling pressures are normal. Normal PA pressures. Reduced cardiac output level. Basal HM3 LVAD Settings:  Speed 5100 rpm, Flow 4.1 L/min, PI 4.5, Power 3.6 persaud     6/7/24 ECHO  Mild left ventricular dilation is present. LVID:6.1 CM  Left ventricular function is decreased. The ejection fraction is <30%  (severely reduced). Septum midline.  Mild right ventricular dilation is present.  AV is closed. Mild aortic insufficiency is present.  Septum is midline.  Global right ventricular function is mild to moderately reduced.  LV outflow graft cannula flow and velocity is normal.    Assessment and Plan:   Dandy Sands is a 63 year old male admitted on 6/7/2024 as a transfer from [place] where he was most recently admitted 5/31/2024 for weakness, malaise, and persistent diarrhea in setting of chronic driveline infection now transferred to [facility] for ongoing ZAKI cares. He has a past medical history significant for ICM s/p HM3 placement 7/8/2022 at [facility], CAD s/p PCI to LAD 2005, in-stent restenosis with TISHA to mLAD and prox RCA 05/24/2022, HLD, c. Diff 9/2022, SLE, antiphospholipid syndrome, chronic anemia, anxiety, and other conditions. Presents for LVAD follow-up.    He is doing the best I have seen hm since implant. Mild hypovolemia- he will drink more PO fluids. Labs overall stable. Lfts continue to improve. His chronic driveline infection is under control right now. INR slightly low- he possibly missed a dose- he will discuss this A/C clinic. No leukocytosis. Anemia is   improved significantly.     We are working on finding a tape or dressing kit that works better for his skin. He will get a new controller today as well.    #s/p HM3 LVAD 7/08/2022 due to ICM  #s/p CRT-D (2006) s/p RV lead upgrade 1/13/23   #s/p LVAD 7/8/12  #CAD s/p PCI to LAD 2005, insent restenosis and TISHA mLAD, Prox RCA 05/2022     Speed: 5100, previously discussed decreasing d/t elevated RA and very low pcw, but deferring in favor of digoxin for now, may need to consider in the future pending clinical course  RV support: digoxin 125 mcg daily  ACEi/ARB/ARNi: historically had been on lisinopril and hydralazine, now stopped due to dizziness and hypotension  BB: defer due to RV failures   SGLT2i: none currently, previous documentation reflects concern for immunosuppression and concern for infections/unclear evidence in LVAD patients  MRA: none, reportedly due to hypotension  Anticoagulation: warfarin INR goal 2-3, INR 1.88 today- dosing per coumadin clinic. Dandy will let them know he thinks he might have missed a dose a few days ago  ASA: not indicated  Volume status: euvolemic to mild hypovolemia. Encourage increased PO intake. He does have lasix available PRN, but hasn't needed in months    VAD interrogation October 2, 2024: VAD interrogation reviewed with VAD coordinator. Agree with findings. Rare PI events. No power spikes, speed drops, or other findings suspicious of pump malfunction noted. He did have quieter alarms so get got a new controller today.    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 Currently infection is well controlled with very liminted drainage. He is struggling more right now with rashes at the adhesive sites.   - S/p micafungin for yeast on wound culture- ended summer 2024.   - On Bactrim DS daily  - On Dalbavancin every other week  - LVAD coordinators discussing alternative tapes/dressing kits to  "help with the adhesive rash.    # Abnormal LFTs, improving  AST/ALT 100s to 300s since at least 06/01 at [place], on admission /. Previously thought related to congestive hepatopathy, however RA ~5. Bilirubin WNL, alk phos 237. RUQ ultrasound with mildly increased echogenicity of the hepatic parenchyma with possible hepatic steatosis. Also considering that this could be drug induced. Hepatology was consulted during 6/2024 admission and felt \"cause likely multifactorial with cholestasis of chronic illness with driveline infection, antibiotics causing transient increase/DILI, recent fluid volume overload in setting of HM3 and ICM contributing to hepatic congestion\"  - Off lipitor at this time- could consider adding this back at next appointment.  - LFTs continue to improve- AST remains above the upper limit of normal at 58, but continues to improve     # Diarrhea, chronic, resolved  History frequent diarrhea since being on antibiotics, which per his report he has been on nearly since time of placement. His most significant concern and what he finds most puzzling is that it typically goes away in the hospital and returns once home. He believes it may be due to dalbavancin although he did get a dose 05/31 and has not had diarrhea in some time so unclear. Diet is high in lactose and greasy food . No diarrhea since June 2024!  - GI consulted, luminal signed off  - STRICT no lactose diet    # C. Diff  Two admission for C. Diff 09/2022, negative 5/23/2024. No current diarrhea  - monitor     # HLD  - holding atorvastatin 40 mg PO daily as above- consider restarting with his next appointment      # SLE, antiphospholipid syndrome  - PTA hydroxychloroquine   - A/C as above    Follow up   - local cardiologist in 3 months  - Dr. Lora in 6 months         - I spent 5 minutes reviewing prior records and notes prior to the appointment, 29 minutes face to face with the patient and an additional 11 minutes coordinating " care and completing documentation on the day of service    The longitudinal plan of care for the diagnosis(es)/condition(s) as documented were addressed during this visit. Due to the added complexity in care, I will continue to support Dandy in the subsequent management and with ongoing continuity of care.          Laila España PA-C  [facility]          CC  VICKY [facility]ITAL      Please do not hesitate to contact me if you have any questions/concerns.     Sincerely,     Laila España PA-C

## 2024-10-02 NOTE — NURSING NOTE
MCS VAD Pump Info       Row Name 10/02/24 0900             MCS VAD Information    Implant LVAD      LVAD Pump HeartMate 3         Heartmate 3 LEFT VS    Flow (Lpm) 3.5 Lpm      Pulse Index (PI) 6.1 PI      Speed (rpm) 5100 rpm      Power (persaud) 3.6 persaud      Current Hct setting 37      Retired: Unexpected Alarms --         Primary Controller    Serial number [id number]      Low flow alarm setting 2.0      High watt alarm setting --      EBB: Patient use 9      Replace in 5 Months         Backup Controller    Serial number [id number]      EBB: Patient use 8      Replace EBB in 5 Months      Speed & HCT match primary controller --         VAD Interrogation    Alarms reported by patient N      Unexpected alarms noted upon interrogation None      PI events Rare  History goes back 1 week      Damage to equipment is noted Y  decrease volume on controller      Action taken Equipment replaced (see orders)         Driveline Exit Site    Dressing change done Y      Driveline properly secured No (patient re-educated)      DLES assessment c/d/i  rash under adhesive      Dressing used Daily kit      Frequency patient changes dressing Every other day      Dressing modifications --      Dressing change supplier --                      Education Complete: Yes   Charge the BACKUP controller s backup battery every 6 months  Perform a self test on BACKUP every 6 months  Change the MPU s batteries every 6 months:Yes  Have equipment serviced yearly (if applicable):Yes    Reviewed Heartmate battery maintenance. Hand out given to patient regarding weekly, monthly, and yearly maintenance.

## 2024-10-05 LAB
MDC_IDC_EPISODE_DTM: NORMAL
MDC_IDC_EPISODE_DURATION: 0 S
MDC_IDC_EPISODE_DURATION: 1 S
MDC_IDC_EPISODE_DURATION: 1 S
MDC_IDC_EPISODE_DURATION: 172 S
MDC_IDC_EPISODE_DURATION: 2 S
MDC_IDC_EPISODE_DURATION: 222 S
MDC_IDC_EPISODE_DURATION: 2398 S
MDC_IDC_EPISODE_DURATION: 3011 S
MDC_IDC_EPISODE_DURATION: 3408 S
MDC_IDC_EPISODE_DURATION: 57 S
MDC_IDC_EPISODE_DURATION: 76 S
MDC_IDC_EPISODE_DURATION: 795 S
MDC_IDC_EPISODE_DURATION: NORMAL S
MDC_IDC_EPISODE_ID: 66
MDC_IDC_EPISODE_ID: 67
MDC_IDC_EPISODE_ID: 68
MDC_IDC_EPISODE_ID: 69
MDC_IDC_EPISODE_ID: 70
MDC_IDC_EPISODE_ID: 71
MDC_IDC_EPISODE_ID: 72
MDC_IDC_EPISODE_ID: 73
MDC_IDC_EPISODE_ID: 74
MDC_IDC_EPISODE_ID: 75
MDC_IDC_EPISODE_ID: 76
MDC_IDC_EPISODE_ID: 77
MDC_IDC_EPISODE_ID: 78
MDC_IDC_EPISODE_ID: 79
MDC_IDC_EPISODE_ID: 80
MDC_IDC_EPISODE_TYPE: NORMAL
MDC_IDC_EPISODE_TYPE_INDUCED: NO
MDC_IDC_LEAD_CONNECTION_STATUS: NORMAL
MDC_IDC_LEAD_IMPLANT_DT: NORMAL
MDC_IDC_LEAD_LOCATION: NORMAL
MDC_IDC_LEAD_LOCATION_DETAIL_1: NORMAL
MDC_IDC_LEAD_MFG: NORMAL
MDC_IDC_LEAD_MODEL: NORMAL
MDC_IDC_LEAD_POLARITY_TYPE: NORMAL
MDC_IDC_LEAD_SERIAL: NORMAL
MDC_IDC_LEAD_SPECIAL_FUNCTION: NORMAL
MDC_IDC_MSMT_BATTERY_DTM: NORMAL
MDC_IDC_MSMT_BATTERY_REMAINING_LONGEVITY: 34 MO
MDC_IDC_MSMT_BATTERY_RRT_TRIGGER: 2.73
MDC_IDC_MSMT_BATTERY_STATUS: NORMAL
MDC_IDC_MSMT_BATTERY_VOLTAGE: 2.95 V
MDC_IDC_MSMT_LEADCHNL_RA_IMPEDANCE_VALUE: 342 OHM
MDC_IDC_MSMT_LEADCHNL_RA_PACING_THRESHOLD_AMPLITUDE: 0.75 V
MDC_IDC_MSMT_LEADCHNL_RA_PACING_THRESHOLD_PULSEWIDTH: 0.4 MS
MDC_IDC_MSMT_LEADCHNL_RA_SENSING_INTR_AMPL: 1.5 MV
MDC_IDC_MSMT_LEADCHNL_RV_IMPEDANCE_VALUE: 399 OHM
MDC_IDC_MSMT_LEADCHNL_RV_IMPEDANCE_VALUE: 513 OHM
MDC_IDC_MSMT_LEADCHNL_RV_PACING_THRESHOLD_AMPLITUDE: 0.5 V
MDC_IDC_MSMT_LEADCHNL_RV_PACING_THRESHOLD_PULSEWIDTH: 0.4 MS
MDC_IDC_MSMT_LEADCHNL_RV_SENSING_INTR_AMPL: 6.75 MV
MDC_IDC_PG_IMPLANT_DTM: NORMAL
MDC_IDC_PG_MFG: NORMAL
MDC_IDC_PG_MODEL: NORMAL
MDC_IDC_PG_SERIAL: NORMAL
MDC_IDC_PG_TYPE: NORMAL
MDC_IDC_SESS_CLINIC_NAME: NORMAL
MDC_IDC_SESS_DTM: NORMAL
MDC_IDC_SESS_TYPE: NORMAL
MDC_IDC_SET_BRADY_AT_MODE_SWITCH_RATE: 171 {BEATS}/MIN
MDC_IDC_SET_BRADY_HYSTRATE: NORMAL
MDC_IDC_SET_BRADY_LOWRATE: 50 {BEATS}/MIN
MDC_IDC_SET_BRADY_MAX_SENSOR_RATE: 115 {BEATS}/MIN
MDC_IDC_SET_BRADY_MAX_TRACKING_RATE: 130 {BEATS}/MIN
MDC_IDC_SET_BRADY_MODE: NORMAL
MDC_IDC_SET_BRADY_PAV_DELAY_LOW: 350 MS
MDC_IDC_SET_BRADY_SAV_DELAY_LOW: 350 MS
MDC_IDC_SET_LEADCHNL_RA_PACING_AMPLITUDE: 1.5 V
MDC_IDC_SET_LEADCHNL_RA_PACING_ANODE_ELECTRODE_1: NORMAL
MDC_IDC_SET_LEADCHNL_RA_PACING_ANODE_LOCATION_1: NORMAL
MDC_IDC_SET_LEADCHNL_RA_PACING_CAPTURE_MODE: NORMAL
MDC_IDC_SET_LEADCHNL_RA_PACING_CATHODE_ELECTRODE_1: NORMAL
MDC_IDC_SET_LEADCHNL_RA_PACING_CATHODE_LOCATION_1: NORMAL
MDC_IDC_SET_LEADCHNL_RA_PACING_POLARITY: NORMAL
MDC_IDC_SET_LEADCHNL_RA_PACING_PULSEWIDTH: 0.4 MS
MDC_IDC_SET_LEADCHNL_RA_SENSING_ANODE_ELECTRODE_1: NORMAL
MDC_IDC_SET_LEADCHNL_RA_SENSING_ANODE_LOCATION_1: NORMAL
MDC_IDC_SET_LEADCHNL_RA_SENSING_CATHODE_ELECTRODE_1: NORMAL
MDC_IDC_SET_LEADCHNL_RA_SENSING_CATHODE_LOCATION_1: NORMAL
MDC_IDC_SET_LEADCHNL_RA_SENSING_POLARITY: NORMAL
MDC_IDC_SET_LEADCHNL_RA_SENSING_SENSITIVITY: 0.15 MV
MDC_IDC_SET_LEADCHNL_RV_PACING_AMPLITUDE: 2 V
MDC_IDC_SET_LEADCHNL_RV_PACING_ANODE_ELECTRODE_1: NORMAL
MDC_IDC_SET_LEADCHNL_RV_PACING_ANODE_LOCATION_1: NORMAL
MDC_IDC_SET_LEADCHNL_RV_PACING_CAPTURE_MODE: NORMAL
MDC_IDC_SET_LEADCHNL_RV_PACING_CATHODE_ELECTRODE_1: NORMAL
MDC_IDC_SET_LEADCHNL_RV_PACING_CATHODE_LOCATION_1: NORMAL
MDC_IDC_SET_LEADCHNL_RV_PACING_POLARITY: NORMAL
MDC_IDC_SET_LEADCHNL_RV_PACING_PULSEWIDTH: 0.4 MS
MDC_IDC_SET_LEADCHNL_RV_SENSING_ANODE_ELECTRODE_1: NORMAL
MDC_IDC_SET_LEADCHNL_RV_SENSING_ANODE_LOCATION_1: NORMAL
MDC_IDC_SET_LEADCHNL_RV_SENSING_CATHODE_ELECTRODE_1: NORMAL
MDC_IDC_SET_LEADCHNL_RV_SENSING_CATHODE_LOCATION_1: NORMAL
MDC_IDC_SET_LEADCHNL_RV_SENSING_POLARITY: NORMAL
MDC_IDC_SET_LEADCHNL_RV_SENSING_SENSITIVITY: 0.3 MV
MDC_IDC_SET_ZONE_DETECTION_BEATS_DENOMINATOR: 24 {BEATS}
MDC_IDC_SET_ZONE_DETECTION_BEATS_DENOMINATOR: 32 {BEATS}
MDC_IDC_SET_ZONE_DETECTION_BEATS_DENOMINATOR: 40 {BEATS}
MDC_IDC_SET_ZONE_DETECTION_BEATS_NUMERATOR: 24 {BEATS}
MDC_IDC_SET_ZONE_DETECTION_BEATS_NUMERATOR: 30 {BEATS}
MDC_IDC_SET_ZONE_DETECTION_BEATS_NUMERATOR: 32 {BEATS}
MDC_IDC_SET_ZONE_DETECTION_INTERVAL: 280 MS
MDC_IDC_SET_ZONE_DETECTION_INTERVAL: 320 MS
MDC_IDC_SET_ZONE_DETECTION_INTERVAL: 350 MS
MDC_IDC_SET_ZONE_DETECTION_INTERVAL: 350 MS
MDC_IDC_SET_ZONE_DETECTION_INTERVAL: 400 MS
MDC_IDC_SET_ZONE_STATUS: NORMAL
MDC_IDC_SET_ZONE_TYPE: NORMAL
MDC_IDC_SET_ZONE_VENDOR_TYPE: NORMAL
MDC_IDC_STAT_AT_BURDEN_PERCENT: 21.2 %
MDC_IDC_STAT_AT_DTM_END: NORMAL
MDC_IDC_STAT_AT_DTM_START: NORMAL
MDC_IDC_STAT_BRADY_AP_VP_PERCENT: 0.09 %
MDC_IDC_STAT_BRADY_AP_VS_PERCENT: 0.25 %
MDC_IDC_STAT_BRADY_AS_VP_PERCENT: 2.77 %
MDC_IDC_STAT_BRADY_AS_VS_PERCENT: 96.89 %
MDC_IDC_STAT_BRADY_DTM_END: NORMAL
MDC_IDC_STAT_BRADY_DTM_START: NORMAL
MDC_IDC_STAT_BRADY_RA_PERCENT_PACED: 0.33 %
MDC_IDC_STAT_BRADY_RV_PERCENT_PACED: 2.91 %
MDC_IDC_STAT_EPISODE_RECENT_COUNT: 0
MDC_IDC_STAT_EPISODE_RECENT_COUNT: 11
MDC_IDC_STAT_EPISODE_RECENT_COUNT: 4
MDC_IDC_STAT_EPISODE_RECENT_COUNT_DTM_END: NORMAL
MDC_IDC_STAT_EPISODE_RECENT_COUNT_DTM_START: NORMAL
MDC_IDC_STAT_EPISODE_TOTAL_COUNT: 0
MDC_IDC_STAT_EPISODE_TOTAL_COUNT: 1
MDC_IDC_STAT_EPISODE_TOTAL_COUNT: 1
MDC_IDC_STAT_EPISODE_TOTAL_COUNT: 17
MDC_IDC_STAT_EPISODE_TOTAL_COUNT: 49
MDC_IDC_STAT_EPISODE_TOTAL_COUNT_DTM_END: NORMAL
MDC_IDC_STAT_EPISODE_TOTAL_COUNT_DTM_START: NORMAL
MDC_IDC_STAT_EPISODE_TYPE: NORMAL
MDC_IDC_STAT_TACHYTHERAPY_ATP_DELIVERED_RECENT: 0
MDC_IDC_STAT_TACHYTHERAPY_ATP_DELIVERED_TOTAL: 0
MDC_IDC_STAT_TACHYTHERAPY_RECENT_DTM_END: NORMAL
MDC_IDC_STAT_TACHYTHERAPY_RECENT_DTM_START: NORMAL
MDC_IDC_STAT_TACHYTHERAPY_SHOCKS_ABORTED_RECENT: 0
MDC_IDC_STAT_TACHYTHERAPY_SHOCKS_ABORTED_TOTAL: 0
MDC_IDC_STAT_TACHYTHERAPY_SHOCKS_DELIVERED_RECENT: 0
MDC_IDC_STAT_TACHYTHERAPY_SHOCKS_DELIVERED_TOTAL: 1
MDC_IDC_STAT_TACHYTHERAPY_TOTAL_DTM_END: NORMAL
MDC_IDC_STAT_TACHYTHERAPY_TOTAL_DTM_START: NORMAL

## 2024-10-10 ENCOUNTER — CARE COORDINATION (OUTPATIENT)
Dept: CARDIOLOGY | Facility: CLINIC | Age: 64
End: 2024-10-10
Payer: MEDICARE

## 2024-10-10 NOTE — PROGRESS NOTES
"Called patient to check in regarding low flow alarms, given recent controller change from low flow controller to standard controller w/2.5L flow threshold. Patient denies any low flow alarms at all since clinic appointment, and endorses feeling \"really good lately\". Pt's weights have been stable, appetite has increased. DLES asymptomatic at this time. Pt does not have any VAD-related questions or concerns at this time and notes that he is happy to have his quality of life back, and is spending lots of time outdoors again and with family. Encouraged pt to reach out with any VAD-related needs and to page the VAD coordinator on call with any urgent VAD-related questions or concerns.  "

## 2024-10-11 ENCOUNTER — TELEPHONE (OUTPATIENT)
Dept: ANTICOAGULATION | Facility: CLINIC | Age: 64
End: 2024-10-11
Payer: MEDICARE

## 2024-10-11 NOTE — TELEPHONE ENCOUNTER
ANTICOAGULATION     Danyd Sands is overdue for an INR check.     Spoke with Dandy and he will go into the lab on 10/14/24    Holli Silva, RN  10/11/2024  Anticoagulation Clinic  [facility] for routing messages: ollie PECK LVAD  [facility] patient phone line: [phone]

## 2024-10-14 ENCOUNTER — CARE COORDINATION (OUTPATIENT)
Dept: CARDIOLOGY | Facility: CLINIC | Age: 64
End: 2024-10-14
Payer: MEDICARE

## 2024-10-14 ENCOUNTER — TELEPHONE (OUTPATIENT)
Dept: CARDIOLOGY | Facility: CLINIC | Age: 64
End: 2024-10-14
Payer: MEDICARE

## 2024-10-14 NOTE — PROGRESS NOTES
Spoke with patient regarding a low flow alarm overnight. Pt was asymptomatic, and notes that he's been feeling great. His weights and VAD parameters are WNL. Reminded pt that his controller is no longer a low flow controller, so he may experience occasional low flow alarms. Recommended that he page if the low flow alarms are accompanied by symptoms, changes in weight, or increase in frequency. Noted that if the low flow alarms affect his quality of life, we can offer to program a low flow controller through [employer] if requested. Pt declined at this time and does not have any other concerns at this time.

## 2024-10-16 ENCOUNTER — MYC MEDICAL ADVICE (OUTPATIENT)
Dept: ANTICOAGULATION | Facility: CLINIC | Age: 64
End: 2024-10-16
Payer: MEDICARE

## 2024-10-22 ENCOUNTER — ANTICOAGULATION THERAPY VISIT (OUTPATIENT)
Dept: ANTICOAGULATION | Facility: CLINIC | Age: 64
End: 2024-10-22
Payer: MEDICARE

## 2024-10-22 DIAGNOSIS — I50.20 HEART FAILURE WITH REDUCED EJECTION FRACTION, NYHA CLASS III (H): ICD-10-CM

## 2024-10-22 DIAGNOSIS — I50.22 CHRONIC SYSTOLIC CONGESTIVE HEART FAILURE (H): Primary | ICD-10-CM

## 2024-10-22 DIAGNOSIS — Z95.811 LVAD (LEFT VENTRICULAR ASSIST DEVICE) PRESENT (H): ICD-10-CM

## 2024-10-22 DIAGNOSIS — Z95.811 LEFT VENTRICULAR ASSIST DEVICE PRESENT (H): ICD-10-CM

## 2024-10-22 NOTE — PROGRESS NOTES
ANTICOAGULATION MANAGEMENT     Dandy Sands 64 year old male is on warfarin with therapeutic INR result. (Goal INR 2.0-3.0)    Recent labs: (last 7 days)     10/18/24  0855   INR 2.2*       ASSESSMENT     Source(s): Chart Review  Previous INR was Subtherapeutic (due to missed dose), booster x 1  Medication, diet, health changes since last INR chart reviewed; none identified         PLAN     Recommended plan for no diet, medication or health factor changes affecting INR     Dosing Instructions: Continue your current warfarin dose with next INR in 3 weeks       Summary  As of 10/22/2024      Full warfarin instructions:  7.5 mg every Tue, Thu, Sat; 5 mg all other days   Next INR check:  11/8/2024               Detailed voice message left for Dandy with dosing instructions and follow up date.   Sent [employer] message with dosing and follow up instructions    Patient using outside facility for labs    Education provided: Please call back if any changes to your diet, medications or how you've been taking warfarin  Goal range and lab monitoring: goal range and significance of current result and Importance of following up at instructed interval  Contact [phone] with any changes, questions or concerns.     Plan made per [facility] anticoagulation protocol and per LVAD protocol    LORENA MOSES RN  10/22/2024  Anticoagulation Clinic  [facility] for routing messages: ollie ANTICOAG LVAD  [facility] patient phone line: [phone]        _______________________________________________________________________     Anticoagulation Episode Summary       Current INR goal:  2.0-3.0   TTR:  58.6% (11.4 mo)   Target end date:  Indefinite   Send INR reminders to:  ANTICOAG LVAD    Indications    Chronic systolic congestive heart failure (H) [I50.22]  LVAD (left ventricular assist device) present (H) [Z95.811]  Heart failure with reduced ejection fraction  NYHA class III (H) [I50.20]  Left ventricular assist device present (H) [Z95.811]              Comments:  Follow VAD Anticoag protocol:Yes: HeartMate 3   Bridging: No bridging epistaxis.   Date VAD placed: 7/8/22             Anticoagulation Care Providers       Provider Role Specialty Phone number    Kelly Lora MD Referring Cardiovascular Disease [phone]

## 2024-10-30 ENCOUNTER — DOCUMENTATION ONLY (OUTPATIENT)
Dept: ANTICOAGULATION | Facility: CLINIC | Age: 64
End: 2024-10-30
Payer: MEDICARE

## 2024-10-30 DIAGNOSIS — Z95.811 LVAD (LEFT VENTRICULAR ASSIST DEVICE) PRESENT (H): Primary | ICD-10-CM

## 2024-10-30 NOTE — PROGRESS NOTES
ANTICOAGULATION CLINIC REFERRAL RENEWAL REQUEST       An annual renewal order is required for all patients referred to [facility].?  Please review and sign the pended referral order for Dandy Sands.       ANTICOAGULATION SUMMARY      Warfarin indication(s)   LVAD    Mechanical heart valve present?  NO       Current goal range   INR: 2.0-3.0     Goal appropriate for indication? Goal INR 2-3, standard for indication(s) above     Time in Therapeutic Range (TTR)  (Goal > 60%) 59%       Office visit with referring provider's group within last year yes on 10/2/24       Chon Tinsley, RN  [facility]

## 2024-11-04 ENCOUNTER — ANTICOAGULATION THERAPY VISIT (OUTPATIENT)
Dept: ANTICOAGULATION | Facility: CLINIC | Age: 64
End: 2024-11-04
Payer: MEDICARE

## 2024-11-04 DIAGNOSIS — I50.22 CHRONIC SYSTOLIC CONGESTIVE HEART FAILURE (H): Primary | ICD-10-CM

## 2024-11-04 DIAGNOSIS — I50.20 HEART FAILURE WITH REDUCED EJECTION FRACTION, NYHA CLASS III (H): ICD-10-CM

## 2024-11-04 DIAGNOSIS — Z95.811 LEFT VENTRICULAR ASSIST DEVICE PRESENT (H): ICD-10-CM

## 2024-11-04 DIAGNOSIS — Z95.811 LVAD (LEFT VENTRICULAR ASSIST DEVICE) PRESENT (H): ICD-10-CM

## 2024-11-04 NOTE — PROGRESS NOTES
ANTICOAGULATION MANAGEMENT     Dandy Sands 64 year old male is on warfarin with therapeutic INR result. (Goal INR 2.0-3.0)    Recent labs: (last 7 days)     11/01/24  0919   INR 2.3*       ASSESSMENT     Source(s): Chart Review  Previous INR was Therapeutic last visit; previously outside of goal range  Medication, diet, health changes since last INR chart reviewed; none identified         PLAN     Recommended plan for no diet, medication or health factor changes affecting INR     Dosing Instructions: Continue your current warfarin dose with next INR in 2-3 weeks       Summary  As of 11/4/2024      Full warfarin instructions:  7.5 mg every Tue, Thu, Sat; 5 mg all other days   Next INR check:  11/22/2024               Detailed voice message left for Dandy with dosing instructions and follow up date.   Sent [employer] message with dosing and follow up instructions    Patient using outside facility for labs    Education provided: Please call back if any changes to your diet, medications or how you've been taking warfarin  Goal range and lab monitoring: goal range and significance of current result and Importance of following up at instructed interval  Contact [phone] with any changes, questions or concerns.     Plan made per [facility] anticoagulation protocol and per LVAD protocol    LORENA MOSES RN  11/4/2024  Anticoagulation Clinic  [employer] for routing messages: ollie ANTICOAG LVAD  [facility] patient phone line: [phone]        _______________________________________________________________________     Anticoagulation Episode Summary       Current INR goal:  2.0-3.0   TTR:  60.5% (11.4 mo)   Target end date:  Indefinite   Send INR reminders to:  DIOAG LVAD    Indications    Chronic systolic congestive heart failure (H) [I50.22]  LVAD (left ventricular assist device) present (H) [Z95.811]  Heart failure with reduced ejection fraction  NYHA class III (H) [I50.20]  Left ventricular assist device present (H) [Z95.811]              Comments:  Follow VAD Anticoag protocol:Yes: HeartMate 3   Bridging: No bridging epistaxis.   Date VAD placed: 7/8/22             Anticoagulation Care Providers       Provider Role Specialty Phone number    Kelly Lora MD Referring Cardiovascular Disease [phone]

## 2024-11-07 ENCOUNTER — CARE COORDINATION (OUTPATIENT)
Dept: CARDIOLOGY | Facility: CLINIC | Age: 64
End: 2024-11-07
Payer: MEDICARE

## 2024-11-15 ENCOUNTER — CARE COORDINATION (OUTPATIENT)
Dept: CARDIOLOGY | Facility: CLINIC | Age: 64
End: 2024-11-15
Payer: MEDICARE

## 2024-11-18 ENCOUNTER — CARE COORDINATION (OUTPATIENT)
Dept: CARDIOLOGY | Facility: CLINIC | Age: 64
End: 2024-11-18
Payer: MEDICARE

## 2024-11-18 ENCOUNTER — ANTICOAGULATION THERAPY VISIT (OUTPATIENT)
Dept: ANTICOAGULATION | Facility: CLINIC | Age: 64
End: 2024-11-18
Payer: MEDICARE

## 2024-11-18 DIAGNOSIS — Z95.811 LVAD (LEFT VENTRICULAR ASSIST DEVICE) PRESENT (H): ICD-10-CM

## 2024-11-18 DIAGNOSIS — I50.22 CHRONIC SYSTOLIC CONGESTIVE HEART FAILURE (H): Primary | ICD-10-CM

## 2024-11-18 DIAGNOSIS — I50.20 HEART FAILURE WITH REDUCED EJECTION FRACTION, NYHA CLASS III (H): ICD-10-CM

## 2024-11-18 DIAGNOSIS — Z95.811 LEFT VENTRICULAR ASSIST DEVICE PRESENT (H): ICD-10-CM

## 2024-11-18 NOTE — PROGRESS NOTES
"Spoke with patient regarding DLES management plan. Pt denies s/s of systemic infection, and has not had any low flow alarms as of late. Dandy confirms that tissue formation around driveline is quite painful, swollen, and continues to increase with purulent drainage. He otherwise feels \"great\", but is willing to present to [facility] ED if recommended to avoid hospitalization around the holidays. Pt notes that he will do whatever is recommended to prevent progression of infection. He is planning on getting his abdominal CT tomorrow as scheduled.     Emphasized that patient should have low threshold to page the VAD coordinator on call with any new or worsening symptoms, and to present to his local ED right away with any s/s of systemic infection. Pt expressed understanding of plan and will await further recs.  "

## 2024-11-18 NOTE — PROGRESS NOTES
"Contacted by Alma Rosa Tomlin, provider at [facility]. Per Alma Rosa, has been following Dandy for quite some time. His driveline and antibiotics have been managed by the [facility]. Alma Rosa expressed concerns s/p visualizing and culturing patient's DLES last week that there be an evolving abscess. Alma Rosa says that swab cultures were positive for previously diagnosed pathogens and are being covered by Bactrim 800mg daily and dalbavancin 20mg/mL IV biweekly. Alma Rosa notes that infection symptoms started about two weeks ago, around the same time that Bactrim had been reduced to 400mg s/t rising LFT's. Bactrim was increased to 800mg after the formation of a \"solid, painful, swollen ridge\" around pt's driveline with increased purulent drainage. Alma Rosa says that when she was pt, pt did not appear to show s/s of systemic illness, but she did see on chart review that he had called the local ED s/t malaise/chills. Alma Rosa has placed an order for abdominal CT tomorrow, and WBC is stable at this time. Provider wondering if this should be managed by [facility]/patient should be transferred to this facility for further workup/possible surgical intervention.     Confirmed with Alma Rosa that labs are stable, that CT is ordered, and abx are on board. Being that it is difficult for patient to get to [place], deferred ED recommendation at this time until this writer can speak with patient's primary cardiologist and Dr. Zamora for further recs as long as pt is clinically stable. Asked that Alma Rosa contact this writer tomorrow when abdominal CT has resulted and can be viewed via Care Everywhere.     Will plan to contact pt to check in regarding s/s, and will contact CVTS and Cardiology for recs. Will update Alma Rosa and pt with plan.     "

## 2024-11-18 NOTE — PROGRESS NOTES
ANTICOAGULATION MANAGEMENT     Dandy Sands 64 year old male is on warfarin with therapeutic INR result. (Goal INR 2.0-3.0)    Recent labs: (last 7 days)     11/15/24  0924   INR 2.7*       ASSESSMENT     Source(s): Chart Review and Patient/Caregiver Call     Warfarin doses taken: Warfarin taken as instructed  Diet: No new diet changes identified  Medication/supplement changes: None noted  New illness, injury, or hospitalization: Yes: driveline concerns-will have a CT locally on Friday. Tenderness with touch.   Signs or symptoms of bleeding or clotting: Yes: small amount of bleeding from driveline site.   Previous result: Therapeutic last 2(+) visits  Additional findings:  Dandy would like to recheck an INR in 2 weeks-will contact [facility] with any changes to driveline bleeding or medications changes post CT results.        PLAN     Recommended plan for temporary change(s) affecting INR     Dosing Instructions: Continue your current warfarin dose with next INR in 2 weeks       Summary  As of 11/18/2024      Full warfarin instructions:  7.5 mg every Tue, Thu, Sat; 5 mg all other days   Next INR check:  11/29/2024               Telephone call with Dandy who agrees to plan and repeated back plan correctly    Patient using outside facility for labs    Education provided: Goal range and lab monitoring: goal range and significance of current result and Importance of following up at instructed interval  Symptom monitoring: monitoring for bleeding signs and symptoms  Contact [phone] with any changes, questions or concerns.     Plan made per [facility] anticoagulation protocol and per LVAD protocol    LORENA MOSES RN  11/18/2024  Anticoagulation Clinic  [employer] for routing messages: ollie PECK LVAD  [facility] patient phone line: [phone]        _______________________________________________________________________     Anticoagulation Episode Summary       Current INR goal:  2.0-3.0   TTR:  64.6% (11.4 mo)   Target end date:   Indefinite   Send INR reminders to:  ANTICOAG LVAD    Indications    Chronic systolic congestive heart failure (H) [I50.22]  LVAD (left ventricular assist device) present (H) [Z95.811]  Heart failure with reduced ejection fraction  NYHA class III (H) [I50.20]  Left ventricular assist device present (H) [Z95.811]             Comments:  Follow VAD Anticoag protocol:Yes: HeartMate 3   Bridging: No bridging epistaxis.   Date VAD placed: 7/8/22             Anticoagulation Care Providers       Provider Role Specialty Phone number    Kelly Lora MD Referring Cardiovascular Disease [phone]

## 2024-12-14 NOTE — PROGRESS NOTES
Problem: Safety - Adult  Goal: Free from fall injury  Outcome: Progressing     Problem: Chronic Conditions and Co-morbidities  Goal: Patient's chronic conditions and co-morbidity symptoms are monitored and maintained or improved  Outcome: Progressing      D: Called patient for routine virtual VAD Coordinator rounding. Pt did not answer call. Left vm to call back with any questions or concerns.

## 2024-12-16 ENCOUNTER — ANTICOAGULATION THERAPY VISIT (OUTPATIENT)
Dept: ANTICOAGULATION | Facility: CLINIC | Age: 64
End: 2024-12-16
Payer: MEDICARE

## 2024-12-16 DIAGNOSIS — I50.20 HEART FAILURE WITH REDUCED EJECTION FRACTION, NYHA CLASS III (H): ICD-10-CM

## 2024-12-16 DIAGNOSIS — Z95.811 LEFT VENTRICULAR ASSIST DEVICE PRESENT (H): ICD-10-CM

## 2024-12-16 DIAGNOSIS — Z95.811 LVAD (LEFT VENTRICULAR ASSIST DEVICE) PRESENT (H): ICD-10-CM

## 2024-12-16 DIAGNOSIS — I50.22 CHRONIC SYSTOLIC CONGESTIVE HEART FAILURE (H): Primary | ICD-10-CM

## 2024-12-16 NOTE — PROGRESS NOTES
ANTICOAGULATION MANAGEMENT     Dandy Sands 64 year old male is on warfarin with therapeutic INR result. (Goal INR 2.0-3.0)    Recent labs: (last 7 days)     12/13/24  1043   INR 2.3*       ASSESSMENT     Source(s): Chart Review and Patient/Caregiver Call     Warfarin doses taken: Held for epistaxis recently, resumed over a week ago  Diet: No new diet changes identified  Medication/supplement changes: None noted  New illness, injury, or hospitalization: Yes: evaluated in the ED on 12/2/24 for epistaxis. Followed with ENT for nasal cautery, this is a recurring issue for Dandy  Signs or symptoms of bleeding or clotting: No further episodes of epistaxis since 12/2/24  Previous result: Therapeutic last 2(+) visits  Additional findings: None       PLAN     Recommended plan for no diet, medication or health factor changes affecting INR     Dosing Instructions: Continue your current warfarin dose with next INR in 2 weeks       Summary  As of 12/16/2024      Full warfarin instructions:  7.5 mg every Tue, Thu, Sat; 5 mg all other days   Next INR check:  12/27/2024               Telephone call with Dandy who verbalizes understanding and agrees to plan    Patient using outside facility for labs    Education provided: Goal range and lab monitoring: goal range and significance of current result  Symptom monitoring: monitoring for bleeding signs and symptoms and when to seek medical attention/emergency care  Importance of notifying anticoagulation clinic for: outside INR labs and ED visits, bleeding issues or other concerns  Contact [phone] with any changes, questions or concerns.     Plan made per [facility] anticoagulation protocol and per LVAD protocol    Tanja Mariee RN  12/16/2024  Anticoagulation Clinic  [employer] for routing messages: ollie PECK LVAD  [facility] patient phone line: [phone]        _______________________________________________________________________     Anticoagulation Episode Summary       Current INR  goal:  2.0-3.0   TTR:  67.3% (11.4 mo)   Target end date:  Indefinite   Send INR reminders to:  ANTICOAG LVAD    Indications    Chronic systolic congestive heart failure (H) [I50.22]  LVAD (left ventricular assist device) present (H) [Z95.811]  Heart failure with reduced ejection fraction  NYHA class III (H) [I50.20]  Left ventricular assist device present (H) [Z95.811]             Comments:  Follow VAD Anticoag protocol:Yes: HeartMate 3   Bridging: No bridging epistaxis.   Date VAD placed: 7/8/22             Anticoagulation Care Providers       Provider Role Specialty Phone number    Kelly Lora MD Referring Cardiovascular Disease [phone]

## 2024-12-30 ENCOUNTER — CARE COORDINATION (OUTPATIENT)
Dept: CARDIOLOGY | Facility: CLINIC | Age: 64
End: 2024-12-30
Payer: MEDICARE

## 2024-12-31 NOTE — PROGRESS NOTES
Called [employer] x3 for assistance regarding patient inability to obtain iodine swabs/patient-reported DME Rx error. Emailed [employer] representatives x2, received no response. [facility] office [facility] today for the new years clarisa holiday; will inform patient of status.

## 2025-01-02 ENCOUNTER — CARE COORDINATION (OUTPATIENT)
Dept: CARDIOLOGY | Facility: CLINIC | Age: 65
End: 2025-01-02
Payer: MEDICARE

## 2025-01-02 NOTE — PROGRESS NOTES
Called [facility] general representative regarding lack of response to prior communication attempts to both Dayana Santosbrenda and her listed coverage agent while out of office. Per rep, Dayana is in office today, but her phone is not working. Provided representative with this writer's contact information. Rep says that Dayana should be reaching out shortly.

## 2025-01-07 ENCOUNTER — CARE COORDINATION (OUTPATIENT)
Dept: CARDIOLOGY | Facility: CLINIC | Age: 65
End: 2025-01-07
Payer: MEDICARE

## 2025-01-07 NOTE — PROGRESS NOTES
Called patient to check in; received request from [facility] for biopatch to be added pt's DME Rx. Confirmed with patient that he is not intending to order or use the biopatch, as patient has a documented sensitivity to chlorhexidine. Patient requesting Silverlon patch, which he has been utilizing with his daily dressings for infection suppression. Will touch base with pt's son, Amos, who manages pt's DME orders. Pt denies any new or worsening DLES infection symptoms/systemic infection symptoms.     Patient has been having low flow alarms daily, but notes that this really only happens toward the end of the day or if he drinks too much coffee. Pt is typically asymptomatic during low flows, and denies this affecting his quality of life. Reminded patient that our program can facilitate changing him back to a low flow controller at a nurse visit if this becomes a life-affecting problem. Pt declined at this time. Will revisit offer closer to when patient is scheduled to be seen in [place].     Reeducated patient on importance of adequate hydration and when to page the VAD coordinator on call. Patient endorses weight loss of about 8 pounds s/p Covid infection in December, but that his appetite has improved and is intaking more fluids. Pt does not have any other concerns at this time.

## 2025-01-13 ENCOUNTER — ANTICOAGULATION THERAPY VISIT (OUTPATIENT)
Dept: ANTICOAGULATION | Facility: CLINIC | Age: 65
End: 2025-01-13
Payer: MEDICARE

## 2025-01-13 DIAGNOSIS — I50.20 HEART FAILURE WITH REDUCED EJECTION FRACTION, NYHA CLASS III (H): ICD-10-CM

## 2025-01-13 DIAGNOSIS — Z95.811 LEFT VENTRICULAR ASSIST DEVICE PRESENT (H): ICD-10-CM

## 2025-01-13 DIAGNOSIS — I50.22 CHRONIC SYSTOLIC CONGESTIVE HEART FAILURE (H): Primary | ICD-10-CM

## 2025-01-13 DIAGNOSIS — Z95.811 LVAD (LEFT VENTRICULAR ASSIST DEVICE) PRESENT (H): ICD-10-CM

## 2025-01-13 NOTE — PROGRESS NOTES
ANTICOAGULATION MANAGEMENT     Dandy Sands 64 year old male is on warfarin with subtherapeutic INR result. (Goal INR 2.0-3.0)    Recent labs: (last 7 days)     01/10/25  0907   INR 1.9*       ASSESSMENT     Source(s): Chart Review and Patient/Caregiver Call     Warfarin doses taken: Warfarin taken as instructed  Diet: No new diet changes identified  Medication/supplement changes: None  New illness, injury, or hospitalization: No  Signs or symptoms of bleeding or clotting: Yes: Per last anticoag encounter; Dandy had been having severe nose bleeds  (nasal trumpets); and he had been taking less warfarin than recommended due to the nosebleeds.   Spoke with Dandy:  he saw a new ENT (not [facility] on 1/9/25) and is working with them regarding his nosebleeds.  He does not want to increase warfarin dose today.  Previous result: Therapeutic last 2(+) visits  Additional findings:  Chart Cced to VAD team/ena to see if Dandy should have a lower INR goal range  Received message from Dr. 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  Only other thing I see is that he is still on aspirin, which I think we can stop.  Certainly we do not need for the lvad, but I can't recall if he had prior stroke or something or recent stent.  I dont see anything in careeverywhere.  Either way I think if its been more than a year than the last event requiring aspirin, then we can stop it and hopefully his nose bleeds get better.     Chart routed to VAD team to follow up regarding the ASA--out of scope for Anticoagulation Clinic.         PLAN     Recommended plan for no diet, medication or health factor changes affecting INR     Dosing Instructions: Continue your current warfarin dose with next INR in 2 weeks   (recommended one week, but Dandy states he will check again in 2 weeks when he has his infusion)    Summary  As of 1/13/2025      Full warfarin instructions:  5 mg  every day   Next INR check:  1/24/2025               Telephone call with Dandy who states he does not want to increase warfarin dose--he is working with an ENT on his nose bleeds.   Sent [employer] message with dosing and follow up instructions    Patient using outside facility for labs    Education provided: Contact [phone] with any changes, questions or concerns.     Plan made per [facility] anticoagulation protocol and per LVAD protocol and patient input.    Leatha Abbott RN  1/13/2025  Anticoagulation Clinic  [facility] for routing messages: p ANTICOAG LVAD  [facility] patient phone line: [phone]        _______________________________________________________________________     Anticoagulation Episode Summary       Current INR goal:  2.0-3.0   TTR:  64.9% (11.4 mo)   Target end date:  Indefinite   Send INR reminders to:  ANTICOAG LVAD    Indications    Chronic systolic congestive heart failure (H) [I50.22]  LVAD (left ventricular assist device) present (H) [Z95.811]  Heart failure with reduced ejection fraction  NYHA class III (H) [I50.20]  Left ventricular assist device present (H) [Z95.811]             Comments:  Follow VAD Anticoag protocol:Yes: HeartMate 3   Bridging: No bridging epistaxis.   Date VAD placed: 7/8/22             Anticoagulation Care Providers       Provider Role Specialty Phone number    Kelly Lora MD Referring Cardiovascular Disease [phone]

## 2025-01-14 ENCOUNTER — CARE COORDINATION (OUTPATIENT)
Dept: CARDIOLOGY | Facility: CLINIC | Age: 65
End: 2025-01-14
Payer: MEDICARE

## 2025-01-25 ENCOUNTER — CARE COORDINATION (OUTPATIENT)
Dept: CARDIOLOGY | Facility: CLINIC | Age: 65
End: 2025-01-25
Payer: MEDICARE

## 2025-01-25 NOTE — PROGRESS NOTES
D:  Received call from [place] Provider.  Requesting recommendations on patient's plan of care.  I:  Recommended Provider speak to on call Cardiologist, Dr. Sahni.  Offered number for patient placement to assist w/ connection to Dr. Sahni.  A:  Provider states he has Dr. Sahni's cell number and will call him directly.  P:  VAD Coordinator available for questions or concerns.

## 2025-01-28 ENCOUNTER — CARE COORDINATION (OUTPATIENT)
Dept: CARDIOLOGY | Facility: CLINIC | Age: 65
End: 2025-01-28
Payer: MEDICARE

## 2025-01-28 NOTE — PROGRESS NOTES
"Called to check in with patient 24 hours post hospital discharge. Patient reports doing well, and that swelling in his arm has significantly improved. He is seeing his PCP later this week.     Overall, patient says he is \"doing great\" aside from occasional minor nose bleeds. He recently saw ENT for cautery and has been doing well since. Pt reports that his DLES is doing extremely well, and sees ID [id number]. Low flow alarms have been minimal and typically indicate dehydration; pt has been intentional about increasing fluid intake. Appetite and energy has significantly improved. Pt has no VAD-related questions or concerns at this time. Encouraged patient to reach out with any VAD-related needs.   "

## 2025-02-03 ENCOUNTER — MYC MEDICAL ADVICE (OUTPATIENT)
Dept: ANTICOAGULATION | Facility: CLINIC | Age: 65
End: 2025-02-03
Payer: MEDICARE

## 2025-02-06 ENCOUNTER — TELEPHONE (OUTPATIENT)
Dept: ANTICOAGULATION | Facility: CLINIC | Age: 65
End: 2025-02-06
Payer: MEDICARE

## 2025-02-06 DIAGNOSIS — I50.22 CHRONIC SYSTOLIC CONGESTIVE HEART FAILURE (H): Primary | ICD-10-CM

## 2025-02-06 DIAGNOSIS — I50.20 HEART FAILURE WITH REDUCED EJECTION FRACTION, NYHA CLASS III (H): ICD-10-CM

## 2025-02-06 DIAGNOSIS — Z95.811 LVAD (LEFT VENTRICULAR ASSIST DEVICE) PRESENT (H): ICD-10-CM

## 2025-02-06 DIAGNOSIS — Z95.811 LEFT VENTRICULAR ASSIST DEVICE PRESENT (H): ICD-10-CM

## 2025-02-06 NOTE — TELEPHONE ENCOUNTER
ANTICOAGULATION     Dandy Sands is overdue for an INR check.     Spoke with Dandy and patient will have labs checked at next office visit on 2/7/25    Chon Tinsley, RN  2/6/2025  Anticoagulation Clinic  [facility] [employer] for routing messages: ollie PECK LVAD  [facility] patient phone line: [phone]

## 2025-02-10 ENCOUNTER — ANTICOAGULATION THERAPY VISIT (OUTPATIENT)
Dept: ANTICOAGULATION | Facility: CLINIC | Age: 65
End: 2025-02-10
Payer: MEDICARE

## 2025-02-10 DIAGNOSIS — I50.22 CHRONIC SYSTOLIC CONGESTIVE HEART FAILURE (H): Primary | ICD-10-CM

## 2025-02-10 DIAGNOSIS — I50.20 HEART FAILURE WITH REDUCED EJECTION FRACTION, NYHA CLASS III (H): ICD-10-CM

## 2025-02-10 DIAGNOSIS — Z95.811 LEFT VENTRICULAR ASSIST DEVICE PRESENT (H): ICD-10-CM

## 2025-02-10 DIAGNOSIS — Z95.811 LVAD (LEFT VENTRICULAR ASSIST DEVICE) PRESENT (H): ICD-10-CM

## 2025-02-10 RX ORDER — WARFARIN SODIUM 2.5 MG/1
TABLET ORAL
Qty: 210 TABLET | Refills: 1 | Status: SHIPPED | OUTPATIENT
Start: 2025-02-10

## 2025-02-10 NOTE — PROGRESS NOTES
ANTICOAGULATION MANAGEMENT     Dandy Sands 64 year old male is on warfarin with subtherapeutic INR result. (Goal INR 2.0-3.0)    Recent labs: (last 7 days)     02/07/25  0922   INR 1.9*       ASSESSMENT     Source(s): Chart Review and Patient/Caregiver Call     Warfarin doses taken: Missed dose(s) may be affecting INR  Diet: No new diet changes identified  Medication/supplement changes: None noted  New illness, injury, or hospitalization: No  Signs or symptoms of bleeding or clotting: No  Previous result: Therapeutic last visit; previously outside of goal range  Additional findings: Refill needed today. Dandy meets all criteria for refill (current [facility] referral, visit with referring provider/group in last 15 months unless directed to return in 2 years in last referring provider visit note, lab monitoring up to date or not exceeding 2 weeks overdue). Rx instructions and quantity supplied updated to match patient's current dosing plan. Warfarin 90 day supply with 1 refill granted per [facility] protocol        PLAN     Recommended plan for temporary change(s) affecting INR     Dosing Instructions: booster dose then continue your current warfarin dose with next INR in 2 weeks       Summary  As of 2/10/2025      Full warfarin instructions:  2/10: 7.5 mg; Otherwise 5 mg every day   Next INR check:  2/21/2025               Telephone call with Dandy who verbalizes understanding and agrees to plan  Sent [employer] message with dosing and follow up instructions    Patient to recheck with home meter    Education provided: Please call back if any changes to your diet, medications or how you've been taking warfarin  Taking warfarin: purpose of warfarin and how it works, take warfarin at same time each day; preferably in the evening, prescribed tablet strength and color, and importance of following [facility] instructions vs instructions on the prescription bottle  Symptom monitoring: monitoring for bleeding signs and symptoms, monitoring for  clotting signs and symptoms, monitoring for stroke signs and symptoms, when to seek medical attention/emergency care, and if you hit your head or have a bad fall seek emergency care  Importance of notifying anticoagulation clinic for: changes in medications; a sooner lab recheck maybe needed, diarrhea, nausea/vomiting, reduced intake, cold/flu, and/or infections; a sooner lab recheck maybe needed, upcoming surgeries and procedures 2 weeks in advance, and if you did not receive dosing instructions on the same day as your labs were checked    Plan made per [facility] anticoagulation protocol and per LVAD protocol    Chon Tinsley, RN  2/10/2025  Anticoagulation Clinic  [employer] for routing messages: p ANTICOAG LVAD  [facility] patient phone line: [phone]        _______________________________________________________________________     Anticoagulation Episode Summary       Current INR goal:  2.0-3.0   TTR:  65.8% (11.4 mo)   Target end date:  Indefinite   Send INR reminders to:  ANTICOAG LVAD    Indications    Chronic systolic congestive heart failure (H) [I50.22]  LVAD (left ventricular assist device) present (H) [Z95.811]  Heart failure with reduced ejection fraction  NYHA class III (H) [I50.20]  Left ventricular assist device present (H) [Z95.811]             Comments:  Follow VAD Anticoag protocol:Yes: HeartMate 3   Bridging: No bridging epistaxis.   Date VAD placed: 7/8/22             Anticoagulation Care Providers       Provider Role Specialty Phone number    Kelly Lora MD Referring Cardiovascular Disease [phone]

## 2025-04-22 ENCOUNTER — TELEPHONE (OUTPATIENT)
Dept: ANTICOAGULATION | Facility: CLINIC | Age: 65
End: 2025-04-22
Payer: MEDICARE

## 2025-04-22 NOTE — TELEPHONE ENCOUNTER
ANTICOAGULATION     Dandy Sands is overdue for an INR check.     Spoke with Dandy and reminded to scheduled appointment at local/outside lab as soon as possible    Holli Silva RN  4/22/2025  Anticoagulation Clinic  [facility] for routing messages: ollie PECK LVAD  [facility] patient phone line: [phone]

## 2025-04-24 ENCOUNTER — ANTICOAGULATION THERAPY VISIT (OUTPATIENT)
Dept: ANTICOAGULATION | Facility: CLINIC | Age: 65
End: 2025-04-24
Payer: MEDICARE

## 2025-04-24 DIAGNOSIS — Z95.811 LVAD (LEFT VENTRICULAR ASSIST DEVICE) PRESENT (H): ICD-10-CM

## 2025-04-24 DIAGNOSIS — I50.22 CHRONIC SYSTOLIC CONGESTIVE HEART FAILURE (H): Primary | ICD-10-CM

## 2025-04-24 DIAGNOSIS — I50.20 HEART FAILURE WITH REDUCED EJECTION FRACTION, NYHA CLASS III (H): ICD-10-CM

## 2025-04-24 DIAGNOSIS — Z95.811 LEFT VENTRICULAR ASSIST DEVICE PRESENT (H): ICD-10-CM

## 2025-04-24 NOTE — PROGRESS NOTES
ANTICOAGULATION MANAGEMENT     Dandy Sands 64 year old male is on warfarin with therapeutic INR result. (Goal INR 2.0-3.0)    Recent labs: (last 7 days)     04/24/25  0940   INR 2.8*       ASSESSMENT     Source(s): Chart Review and Patient/Caregiver Call     Warfarin doses taken: Warfarin taken as instructed  Diet: No new diet changes identified  Medication/supplement changes: None noted  New illness, injury, or hospitalization: No  Signs or symptoms of bleeding or clotting: No  Previous result: Therapeutic last 2(+) visits  Additional findings: None       PLAN     Recommended plan for no diet, medication or health factor changes affecting INR     Dosing Instructions: Continue your current warfarin dose with next INR in 2 weeks       Summary  As of 4/24/2025      Full warfarin instructions:  7.5 mg every Fri; 5 mg all other days   Next INR check:  5/8/2025               Telephone call with Dandy who verbalizes understanding and agrees to plan    Patient using outside facility for labs    Education provided: Contact [phone] with any changes, questions or concerns.     Plan made per [facility] anticoagulation protocol and per LVAD protocol    Tanja Mariee RN  4/24/2025  Anticoagulation Clinic  [employer] for routing messages: ollie ANTICOJEANNINE LVAD  [facility] patient phone line: [phone]        _______________________________________________________________________     Anticoagulation Episode Summary       Current INR goal:  2.0-3.0   TTR:  58.0% (11.5 mo)   Target end date:  Indefinite   Send INR reminders to:  ANTICOAG LVAD    Indications    Chronic systolic congestive heart failure (H) [I50.22]  LVAD (left ventricular assist device) present (H) [Z95.811]  Heart failure with reduced ejection fraction  NYHA class III (H) [I50.20]  Left ventricular assist device present (H) [Z95.811]             Comments:  Follow VAD Anticoag protocol:Yes: HeartMate 3   Bridging: No bridging epistaxis.   Date VAD placed: 7/8/22              Anticoagulation Care Providers       Provider Role Specialty Phone number    Kelly Lora MD Referring Cardiovascular Disease [phone]

## 2025-05-02 ENCOUNTER — ANCILLARY PROCEDURE (OUTPATIENT)
Dept: CARDIOLOGY | Facility: CLINIC | Age: 65
End: 2025-05-02
Attending: INTERNAL MEDICINE
Payer: MEDICARE

## 2025-05-02 DIAGNOSIS — Z95.811 LVAD (LEFT VENTRICULAR ASSIST DEVICE) PRESENT (H): ICD-10-CM

## 2025-05-02 DIAGNOSIS — I50.22 CHRONIC SYSTOLIC CONGESTIVE HEART FAILURE (H): ICD-10-CM

## 2025-05-02 PROCEDURE — 93283 PRGRMG EVAL IMPLANTABLE DFB: CPT | Performed by: INTERNAL MEDICINE

## 2025-05-03 LAB
MDC_IDC_LEAD_CONNECTION_STATUS: NORMAL
MDC_IDC_LEAD_IMPLANT_DT: NORMAL
MDC_IDC_LEAD_LOCATION: NORMAL
MDC_IDC_LEAD_LOCATION_DETAIL_1: NORMAL
MDC_IDC_LEAD_MFG: NORMAL
MDC_IDC_LEAD_MODEL: NORMAL
MDC_IDC_LEAD_POLARITY_TYPE: NORMAL
MDC_IDC_LEAD_SERIAL: NORMAL
MDC_IDC_LEAD_SPECIAL_FUNCTION: NORMAL
MDC_IDC_MSMT_BATTERY_DTM: NORMAL
MDC_IDC_MSMT_BATTERY_REMAINING_LONGEVITY: 33 MO
MDC_IDC_MSMT_BATTERY_RRT_TRIGGER: 2.73
MDC_IDC_MSMT_BATTERY_STATUS: NORMAL
MDC_IDC_MSMT_BATTERY_VOLTAGE: 2.94 V
MDC_IDC_MSMT_LEADCHNL_RA_IMPEDANCE_VALUE: 342 OHM
MDC_IDC_MSMT_LEADCHNL_RA_PACING_THRESHOLD_AMPLITUDE: 0.75 V
MDC_IDC_MSMT_LEADCHNL_RA_PACING_THRESHOLD_PULSEWIDTH: 0.4 MS
MDC_IDC_MSMT_LEADCHNL_RA_SENSING_INTR_AMPL: 1 MV
MDC_IDC_MSMT_LEADCHNL_RV_IMPEDANCE_VALUE: 361 OHM
MDC_IDC_MSMT_LEADCHNL_RV_IMPEDANCE_VALUE: 475 OHM
MDC_IDC_MSMT_LEADCHNL_RV_PACING_THRESHOLD_AMPLITUDE: 0.5 V
MDC_IDC_MSMT_LEADCHNL_RV_PACING_THRESHOLD_PULSEWIDTH: 0.4 MS
MDC_IDC_MSMT_LEADCHNL_RV_SENSING_INTR_AMPL: 6.38 MV
MDC_IDC_PG_IMPLANT_DTM: NORMAL
MDC_IDC_PG_MFG: NORMAL
MDC_IDC_PG_MODEL: NORMAL
MDC_IDC_PG_SERIAL: NORMAL
MDC_IDC_PG_TYPE: NORMAL
MDC_IDC_SESS_CLINIC_NAME: NORMAL
MDC_IDC_SESS_DTM: NORMAL
MDC_IDC_SESS_TYPE: NORMAL
MDC_IDC_SET_BRADY_AT_MODE_SWITCH_RATE: 171 {BEATS}/MIN
MDC_IDC_SET_BRADY_HYSTRATE: NORMAL
MDC_IDC_SET_BRADY_LOWRATE: 50 {BEATS}/MIN
MDC_IDC_SET_BRADY_MAX_SENSOR_RATE: 115 {BEATS}/MIN
MDC_IDC_SET_BRADY_MAX_TRACKING_RATE: 130 {BEATS}/MIN
MDC_IDC_SET_BRADY_MODE: NORMAL
MDC_IDC_SET_BRADY_PAV_DELAY_LOW: 350 MS
MDC_IDC_SET_BRADY_SAV_DELAY_LOW: 350 MS
MDC_IDC_SET_LEADCHNL_RA_PACING_AMPLITUDE: 1.5 V
MDC_IDC_SET_LEADCHNL_RA_PACING_ANODE_ELECTRODE_1: NORMAL
MDC_IDC_SET_LEADCHNL_RA_PACING_ANODE_LOCATION_1: NORMAL
MDC_IDC_SET_LEADCHNL_RA_PACING_CAPTURE_MODE: NORMAL
MDC_IDC_SET_LEADCHNL_RA_PACING_CATHODE_ELECTRODE_1: NORMAL
MDC_IDC_SET_LEADCHNL_RA_PACING_CATHODE_LOCATION_1: NORMAL
MDC_IDC_SET_LEADCHNL_RA_PACING_POLARITY: NORMAL
MDC_IDC_SET_LEADCHNL_RA_PACING_PULSEWIDTH: 0.4 MS
MDC_IDC_SET_LEADCHNL_RA_SENSING_ANODE_ELECTRODE_1: NORMAL
MDC_IDC_SET_LEADCHNL_RA_SENSING_ANODE_LOCATION_1: NORMAL
MDC_IDC_SET_LEADCHNL_RA_SENSING_CATHODE_ELECTRODE_1: NORMAL
MDC_IDC_SET_LEADCHNL_RA_SENSING_CATHODE_LOCATION_1: NORMAL
MDC_IDC_SET_LEADCHNL_RA_SENSING_POLARITY: NORMAL
MDC_IDC_SET_LEADCHNL_RA_SENSING_SENSITIVITY: 0.15 MV
MDC_IDC_SET_LEADCHNL_RV_PACING_AMPLITUDE: 2 V
MDC_IDC_SET_LEADCHNL_RV_PACING_ANODE_ELECTRODE_1: NORMAL
MDC_IDC_SET_LEADCHNL_RV_PACING_ANODE_LOCATION_1: NORMAL
MDC_IDC_SET_LEADCHNL_RV_PACING_CAPTURE_MODE: NORMAL
MDC_IDC_SET_LEADCHNL_RV_PACING_CATHODE_ELECTRODE_1: NORMAL
MDC_IDC_SET_LEADCHNL_RV_PACING_CATHODE_LOCATION_1: NORMAL
MDC_IDC_SET_LEADCHNL_RV_PACING_POLARITY: NORMAL
MDC_IDC_SET_LEADCHNL_RV_PACING_PULSEWIDTH: 0.4 MS
MDC_IDC_SET_LEADCHNL_RV_SENSING_ANODE_ELECTRODE_1: NORMAL
MDC_IDC_SET_LEADCHNL_RV_SENSING_ANODE_LOCATION_1: NORMAL
MDC_IDC_SET_LEADCHNL_RV_SENSING_CATHODE_ELECTRODE_1: NORMAL
MDC_IDC_SET_LEADCHNL_RV_SENSING_CATHODE_LOCATION_1: NORMAL
MDC_IDC_SET_LEADCHNL_RV_SENSING_POLARITY: NORMAL
MDC_IDC_SET_LEADCHNL_RV_SENSING_SENSITIVITY: 0.3 MV
MDC_IDC_SET_ZONE_DETECTION_BEATS_DENOMINATOR: 24 {BEATS}
MDC_IDC_SET_ZONE_DETECTION_BEATS_DENOMINATOR: 32 {BEATS}
MDC_IDC_SET_ZONE_DETECTION_BEATS_DENOMINATOR: 40 {BEATS}
MDC_IDC_SET_ZONE_DETECTION_BEATS_NUMERATOR: 24 {BEATS}
MDC_IDC_SET_ZONE_DETECTION_BEATS_NUMERATOR: 30 {BEATS}
MDC_IDC_SET_ZONE_DETECTION_BEATS_NUMERATOR: 32 {BEATS}
MDC_IDC_SET_ZONE_DETECTION_INTERVAL: 280 MS
MDC_IDC_SET_ZONE_DETECTION_INTERVAL: 320 MS
MDC_IDC_SET_ZONE_DETECTION_INTERVAL: 350 MS
MDC_IDC_SET_ZONE_DETECTION_INTERVAL: 350 MS
MDC_IDC_SET_ZONE_DETECTION_INTERVAL: 400 MS
MDC_IDC_SET_ZONE_STATUS: NORMAL
MDC_IDC_SET_ZONE_TYPE: NORMAL
MDC_IDC_SET_ZONE_VENDOR_TYPE: NORMAL
MDC_IDC_STAT_AT_BURDEN_PERCENT: 0 %
MDC_IDC_STAT_AT_DTM_END: NORMAL
MDC_IDC_STAT_AT_DTM_START: NORMAL
MDC_IDC_STAT_BRADY_AP_VP_PERCENT: 0.05 %
MDC_IDC_STAT_BRADY_AP_VS_PERCENT: 0.68 %
MDC_IDC_STAT_BRADY_AS_VP_PERCENT: 1.96 %
MDC_IDC_STAT_BRADY_AS_VS_PERCENT: 97.31 %
MDC_IDC_STAT_BRADY_DTM_END: NORMAL
MDC_IDC_STAT_BRADY_DTM_START: NORMAL
MDC_IDC_STAT_BRADY_RA_PERCENT_PACED: 0.73 %
MDC_IDC_STAT_BRADY_RV_PERCENT_PACED: 2.05 %
MDC_IDC_STAT_EPISODE_RECENT_COUNT: 0
MDC_IDC_STAT_EPISODE_RECENT_COUNT_DTM_END: NORMAL
MDC_IDC_STAT_EPISODE_RECENT_COUNT_DTM_START: NORMAL
MDC_IDC_STAT_EPISODE_TOTAL_COUNT: 0
MDC_IDC_STAT_EPISODE_TOTAL_COUNT: 1
MDC_IDC_STAT_EPISODE_TOTAL_COUNT: 1
MDC_IDC_STAT_EPISODE_TOTAL_COUNT: 17
MDC_IDC_STAT_EPISODE_TOTAL_COUNT: 49
MDC_IDC_STAT_EPISODE_TOTAL_COUNT_DTM_END: NORMAL
MDC_IDC_STAT_EPISODE_TOTAL_COUNT_DTM_START: NORMAL
MDC_IDC_STAT_EPISODE_TYPE: NORMAL
MDC_IDC_STAT_TACHYTHERAPY_ATP_DELIVERED_RECENT: 0
MDC_IDC_STAT_TACHYTHERAPY_ATP_DELIVERED_TOTAL: 0
MDC_IDC_STAT_TACHYTHERAPY_RECENT_DTM_END: NORMAL
MDC_IDC_STAT_TACHYTHERAPY_RECENT_DTM_START: NORMAL
MDC_IDC_STAT_TACHYTHERAPY_SHOCKS_ABORTED_RECENT: 0
MDC_IDC_STAT_TACHYTHERAPY_SHOCKS_ABORTED_TOTAL: 0
MDC_IDC_STAT_TACHYTHERAPY_SHOCKS_DELIVERED_RECENT: 0
MDC_IDC_STAT_TACHYTHERAPY_SHOCKS_DELIVERED_TOTAL: 1
MDC_IDC_STAT_TACHYTHERAPY_TOTAL_DTM_END: NORMAL
MDC_IDC_STAT_TACHYTHERAPY_TOTAL_DTM_START: NORMAL

## 2025-05-09 LAB — INR (EXTERNAL): 2.4

## 2025-05-12 ENCOUNTER — ANTICOAGULATION THERAPY VISIT (OUTPATIENT)
Dept: ANTICOAGULATION | Facility: CLINIC | Age: 65
End: 2025-05-12
Payer: MEDICARE

## 2025-05-12 DIAGNOSIS — I50.22 CHRONIC SYSTOLIC CONGESTIVE HEART FAILURE (H): Primary | ICD-10-CM

## 2025-05-12 DIAGNOSIS — I50.20 HEART FAILURE WITH REDUCED EJECTION FRACTION, NYHA CLASS III (H): ICD-10-CM

## 2025-05-12 DIAGNOSIS — Z95.811 LVAD (LEFT VENTRICULAR ASSIST DEVICE) PRESENT (H): ICD-10-CM

## 2025-05-12 DIAGNOSIS — Z95.811 LEFT VENTRICULAR ASSIST DEVICE PRESENT (H): ICD-10-CM

## 2025-05-12 NOTE — PROGRESS NOTES
ANTICOAGULATION MANAGEMENT     Dandy Sands 64 year old male is on warfarin with therapeutic INR result. (Goal INR 2.0-3.0)    Recent labs: (last 7 days)     05/09/25  0000   INR 2.4       ASSESSMENT     Source(s): Chart Review and Patient/Caregiver Call     Warfarin doses taken: Warfarin taken as instructed  Diet: No new diet changes identified  Medication/supplement changes: Patient was started on Ferrous sulfate.  Confirmed no new antibiotics were started  New illness, injury, or hospitalization: No  Signs or symptoms of bleeding or clotting: No  Previous result: Therapeutic last 2(+) visits  Additional findings: Patient is going out of town for Memorial day so he has labs scheduled for 5/27.       PLAN     Recommended plan for no diet, medication or health factor changes affecting INR     Dosing Instructions: Continue your current warfarin dose with next INR in 2 weeks       Summary  As of 5/12/2025      Full warfarin instructions:  7.5 mg every Fri; 5 mg all other days   Next INR check:  5/27/2025               Telephone call with Dandy who verbalizes understanding and agrees to plan and who agrees to plan and repeated back plan correctly    Patient using outside facility for labs    Education provided: Taking warfarin: Importance of taking warfarin as instructed  Goal range and lab monitoring: goal range and significance of current result, Importance of therapeutic range, and Importance of following up at instructed interval    Plan made per [facility] anticoagulation protocol and per LVAD protocol    Holli Silva RN  5/12/2025  Anticoagulation Clinic  [employer] for routing messages: ollie ANTICOAG LVAD  [facility] patient phone line: [phone]        _______________________________________________________________________     Anticoagulation Episode Summary       Current INR goal:  2.0-3.0   TTR:  57.6% (11.4 mo)   Target end date:  Indefinite   Send INR reminders to:  ANTICOAG LVAD    Indications    Chronic systolic  congestive heart failure (H) [I50.22]  LVAD (left ventricular assist device) present (H) [Z95.811]  Heart failure with reduced ejection fraction  NYHA class III (H) [I50.20]  Left ventricular assist device present (H) [Z95.811]             Comments:  Follow VAD Anticoag protocol:Yes: HeartMate 3   Bridging: No bridging epistaxis.   Date VAD placed: 7/8/22             Anticoagulation Care Providers       Provider Role Specialty Phone number    Kelly Lora MD Referring Cardiovascular Disease [phone]

## 2025-05-14 ENCOUNTER — CARE COORDINATION (OUTPATIENT)
Dept: CARDIOLOGY | Facility: CLINIC | Age: 65
End: 2025-05-14
Payer: MEDICARE

## 2025-05-14 NOTE — PROGRESS NOTES
Patient paged wondering if he can stop Aspirin.     He reports he had a history of nose bleeds and about a month ago, he had it cauterized, however the bleeding has returned. He saw a provider who said it may be helpful if he stopped his aspirin.     Upon chart review, patient is on aspirin with antiphospholipid Ab syndrome and will discuss with Dr. Lora.

## 2025-05-14 NOTE — PROGRESS NOTES
Notified patient okay to stop Aspirin.     Reeducated him on the importance of assessment for signs and symptoms of strokes or clots in extremities. Should he continue to have nose bleeds greater than 20 mins, he should page the VAD coordinator.

## 2025-05-29 ENCOUNTER — RESULTS FOLLOW-UP (OUTPATIENT)
Dept: ANTICOAGULATION | Facility: CLINIC | Age: 65
End: 2025-05-29

## 2025-05-29 ENCOUNTER — ANTICOAGULATION THERAPY VISIT (OUTPATIENT)
Dept: ANTICOAGULATION | Facility: CLINIC | Age: 65
End: 2025-05-29
Payer: MEDICARE

## 2025-05-29 DIAGNOSIS — Z95.811 LVAD (LEFT VENTRICULAR ASSIST DEVICE) PRESENT (H): ICD-10-CM

## 2025-05-29 DIAGNOSIS — I50.22 CHRONIC SYSTOLIC CONGESTIVE HEART FAILURE (H): Primary | ICD-10-CM

## 2025-05-29 DIAGNOSIS — I50.20 HEART FAILURE WITH REDUCED EJECTION FRACTION, NYHA CLASS III (H): ICD-10-CM

## 2025-05-29 DIAGNOSIS — Z95.811 LEFT VENTRICULAR ASSIST DEVICE PRESENT (H): ICD-10-CM

## 2025-05-29 RX ORDER — WARFARIN SODIUM 2.5 MG/1
5-7.5 TABLET ORAL EVERY EVENING
Qty: 270 TABLET | Refills: 1 | Status: SHIPPED | OUTPATIENT
Start: 2025-05-29

## 2025-05-29 NOTE — PROGRESS NOTES
"ANTICOAGULATION MANAGEMENT     Dandy CHAPA Shavon 64 year old male is on warfarin with therapeutic INR result. (Goal INR 2.0-3.0)    Recent labs: (last 7 days)     05/27/25  0940   INR 2.2*       ASSESSMENT     Source(s): Chart Review and Patient/Caregiver Call     Warfarin doses taken: Warfarin taken as instructed  Diet: No new diet changes identified  Medication/supplement changes:   STOPPED ASA per MD/VAD coordinator ~5/14/25. Patient reports taking ASA since VAD placement (7/2022). Stopping ASA may reduce risk of bleeding  STARTED One A Day Men's 50+ Multivitamin ~5/14/25. Per [employer], contents of vitamin A and vitamin E may result in increased risk of bleeding  Mention of potentially starting Gentamicin topical. Patient was unsure if he should start this. [facility] RN instructed patient to reach out to LVAD coordinator.    New illness, injury, or hospitalization: Yes: Patient reported getting nose cauterized around 5/13/25 d/t excessive nose bleeds lasting ~6hrs. Reports 2 nose bleeds in past 2 weeks but were \"significantly shorter than they used to be.\" Patient reports nose bleedings lasting ~20 minutes. Denies lightheadedness/dizziness/weakness.  Patient instructed to notify LVAD coordinator regarding nose bleeds and seek medical attention if he has another nose bleed.   Patient reported continued redness around driveline site with slight increase in pain since OV. No drainage reported. Instructed patient to notified LVAD coordinator.  Signs or symptoms of bleeding or clotting: No  Previous result: Therapeutic last 2(+) visits  Additional findings: Encounter routed to LVAD coordinator.       PLAN     Recommended plan for ongoing change(s) affecting INR     Dosing Instructions: Continue your current warfarin dose with next INR in 2 weeks       Summary  As of 5/29/2025      Full warfarin instructions:  7.5 mg every Fri; 5 mg all other days   Next INR check:  6/10/2025               Telephone call with Dandy [place] " verbalizes understanding and agrees to plan and who agrees to plan and repeated back plan correctly    Patient using outside facility for labs    Education provided: Please call back if any changes to your diet, medications or how you've been taking warfarin  Goal range and lab monitoring: goal range and significance of current result, Importance of therapeutic range, and Importance of following up at instructed interval  Dietary considerations: importance of consistent vitamin K intake  Interaction IS anticipated between warfarin and multi-vitamin; stopping ASA  Symptom monitoring: monitoring for bleeding signs and symptoms, monitoring for clotting signs and symptoms, and when to seek medical attention/emergency care  Importance of notifying anticoagulation clinic for: upcoming surgeries and procedures 2 weeks in advance  Contact [phone] with any changes, questions or concerns.     Plan made per [facility] anticoagulation protocol and per LVAD protocol    Liliam Diggs RN  5/29/2025  Anticoagulation Clinic  [employer] for routing messages: [facility] ANTICOAG LVAD  [facility] patient phone line: [phone]        _______________________________________________________________________     Anticoagulation Episode Summary       Current INR goal:  2.0-3.0   TTR:  58.9% (11.4 mo)   Target end date:  Indefinite   Send INR reminders to:  ANTICOAG LVAD    Indications    Chronic systolic congestive heart failure (H) [I50.22]  LVAD (left ventricular assist device) present (H) [Z95.811]  Heart failure with reduced ejection fraction  NYHA class III (H) [I50.20]  Left ventricular assist device present (H) [Z95.811]             Comments:  Follow VAD Anticoag protocol:Yes: HeartMate 3   Bridging: No bridging epistaxis.   Date VAD placed: 7/8/22             Anticoagulation Care Providers       Provider Role Specialty Phone number    Kelly Lora MD Referring Cardiovascular Disease [phone]

## 2025-05-29 NOTE — PROGRESS NOTES
ANTICOAGULATION MANAGEMENT:  Medication Refill    Anticoagulation Summary  As of 5/29/2025      Warfarin maintenance plan:  7.5 mg (2.5 mg x 3) every Fri; 5 mg (2.5 mg x 2) all other days   Next INR check:  6/10/2025   Target end date:  Indefinite    Indications    Chronic systolic congestive heart failure (H) [I50.22]  LVAD (left ventricular assist device) present (H) [Z95.811]  Heart failure with reduced ejection fraction  NYHA class III (H) [I50.20]  Left ventricular assist device present (H) [Z95.811]                 Anticoagulation Care Providers       Provider Role Specialty Phone number    Kelly Lora MD Referring Cardiovascular Disease [phone]            Refill Criteria    Visit with referring provider/group: Meets criteria: visit within referring provider group in the last 15 months on 5/2/25    [facility] referral last signed: 10/31/2024; within last year:  Yes    Lab monitoring is up to date (not exceeding 2 weeks overdue): Yes    Dandy meets all criteria for refill. Rx instructions and quantity supplied updated to match patient's current dosing plan. Warfarin 90 day supply with 1 refill granted per [facility] protocol     Liliam Diggs RN  Anticoagulation Clinic

## 2025-06-11 ENCOUNTER — RESULTS FOLLOW-UP (OUTPATIENT)
Dept: ANTICOAGULATION | Facility: CLINIC | Age: 65
End: 2025-06-11

## 2025-06-11 ENCOUNTER — ANTICOAGULATION THERAPY VISIT (OUTPATIENT)
Dept: ANTICOAGULATION | Facility: CLINIC | Age: 65
End: 2025-06-11
Payer: MEDICARE

## 2025-06-11 ENCOUNTER — TELEPHONE (OUTPATIENT)
Dept: CARDIOLOGY | Facility: CLINIC | Age: 65
End: 2025-06-11
Payer: MEDICARE

## 2025-06-11 NOTE — TELEPHONE ENCOUNTER
Left Voicemail (1st Attempt) and Sent Mychart (1st Attempt) for the patient to call back and schedule the following:    Appointment type: Return EP and return VAD  Provider: EP LAURA and VAD LAURA  Return date: October 2025 for both or next available  Specialty phone number: [phone] Opt 1  Additional appointment(s) needed: Labs, echo, Device Check for VAD  Additonal Notes: **Lab, device check and echo prior to VAD APPOINTMENT

## 2025-06-11 NOTE — PROGRESS NOTES
ANTICOAGULATION MANAGEMENT     Dandy Sands 64 year old male is on warfarin with therapeutic INR result. (Goal INR 2.0-3.0)    Recent labs: (last 7 days)     06/10/25  1028   INR 2.3*       ASSESSMENT     Source(s): Chart Review and Patient/Caregiver Call     Warfarin doses taken: Warfarin taken as instructed  Diet: No new diet changes identified  Medication/supplement changes: ASA recently stopped. Pt started a MVI without K  New illness, injury, or hospitalization: No  Signs or symptoms of bleeding or clotting: No  Previous result: Therapeutic last 2(+) visits  Additional findings: Pt likes to check INR about every 2 weeks       PLAN       Dosing Instructions: Continue your current warfarin dose with next INR in 2 weeks       Summary  As of 6/11/2025      Full warfarin instructions:  7.5 mg every Fri; 5 mg all other days   Next INR check:  6/24/2025               Telephone call with Dandy who verbalizes understanding and agrees to plan    Patient using outside facility for labs    Education provided: Contact [phone] with any changes, questions or concerns.     Plan made per [facility] anticoagulation protocol and per LVAD protocol    Remedios Abdalla RN  6/11/2025  Anticoagulation Clinic  [employer] for routing messages: [facility]ie ANTICOAG LVAD  [facility] patient phone line: [phone]        _______________________________________________________________________     Anticoagulation Episode Summary       Current INR goal:  2.0-3.0   TTR:  60.9% (11.8 mo)   Target end date:  Indefinite   Send INR reminders to:  ANTICOAG LVAD    Indications    Chronic systolic congestive heart failure (H) [I50.22]  LVAD (left ventricular assist device) present (H) [Z95.811]  Heart failure with reduced ejection fraction  NYHA class III (H) [I50.20]  Left ventricular assist device present (H) [Z95.811]             Comments:  Follow VAD Anticoag protocol:Yes: HeartMate 3   Bridging: No bridging epistaxis.   Date VAD placed: 7/8/22              Anticoagulation Care Providers       Provider Role Specialty Phone number    Kelly Lora MD Referring Cardiovascular Disease [phone]

## 2025-06-17 ENCOUNTER — TELEPHONE (OUTPATIENT)
Dept: CARDIOLOGY | Facility: CLINIC | Age: 65
End: 2025-06-17
Payer: MEDICARE

## 2025-06-17 NOTE — TELEPHONE ENCOUNTER
Patient Contacted for the patient to call back and schedule the following:    Appointment type: Return VAD, echo, and labs  Provider: Shraddha Menijvar  Return date: 10/29/25  Specialty phone number: [phone] Opt 1  Additional appointment(s) needed: NA  Additonal Notes: 6 month Follow--up

## 2025-06-25 ENCOUNTER — ANTICOAGULATION THERAPY VISIT (OUTPATIENT)
Dept: ANTICOAGULATION | Facility: CLINIC | Age: 65
End: 2025-06-25
Payer: MEDICARE

## 2025-06-25 ENCOUNTER — RESULTS FOLLOW-UP (OUTPATIENT)
Dept: ANTICOAGULATION | Facility: CLINIC | Age: 65
End: 2025-06-25

## 2025-06-25 DIAGNOSIS — I50.22 CHRONIC SYSTOLIC CONGESTIVE HEART FAILURE (H): Primary | ICD-10-CM

## 2025-06-25 DIAGNOSIS — I50.20 HEART FAILURE WITH REDUCED EJECTION FRACTION, NYHA CLASS III (H): ICD-10-CM

## 2025-06-25 DIAGNOSIS — Z95.811 LVAD (LEFT VENTRICULAR ASSIST DEVICE) PRESENT (H): ICD-10-CM

## 2025-06-25 DIAGNOSIS — Z95.811 LEFT VENTRICULAR ASSIST DEVICE PRESENT (H): ICD-10-CM

## 2025-06-25 NOTE — PROGRESS NOTES
ANTICOAGULATION MANAGEMENT     Dandy Sands 64 year old male is on warfarin with therapeutic INR result. (Goal INR 2.0-3.0)    Recent labs: (last 7 days)     06/24/25  0909   INR 2.1*       ASSESSMENT     Source(s): Chart Review and Patient/Caregiver Call     Warfarin doses taken: Warfarin taken as instructed  Diet: No new diet changes identified  Medication/supplement changes: None noted  New illness, injury, or hospitalization: No  Signs or symptoms of bleeding or clotting: No  Previous result: Therapeutic last 2(+) visits  Additional findings: None       PLAN     Recommended plan for no diet, medication or health factor changes affecting INR     Dosing Instructions: Continue your current warfarin dose with next INR in 2 weeks       Summary  As of 6/25/2025      Full warfarin instructions:  7.5 mg every Fri; 5 mg all other days   Next INR check:  7/8/2025               Telephone call with Dandy who verbalizes understanding and agrees to plan    Patient using outside facility for labs    Education provided: Please call back if any changes to your diet, medications or how you've been taking warfarin    Plan made per [facility] anticoagulation protocol and per LVAD protocol      Kateryna Carter RN  6/25/2025  Anticoagulation Clinic  [employer] for routing messages: [facility] ANTICOAG LVAD  [facility] patient phone line: [phone]        _______________________________________________________________________     Anticoagulation Episode Summary       Current INR goal:  2.0-3.0   TTR:  63.3% (1 y)   Target end date:  Indefinite   Send INR reminders to:  ANTICOAG LVAD    Indications    Chronic systolic congestive heart failure (H) [I50.22]  LVAD (left ventricular assist device) present (H) [Z95.811]  Heart failure with reduced ejection fraction  NYHA class III (H) [I50.20]  Left ventricular assist device present (H) [Z95.811]             Comments:  Follow VAD Anticoag protocol:Yes: HeartMate 3   Bridging: No bridging epistaxis.   Date  VAD placed: 7/8/22             Anticoagulation Care Providers       Provider Role Specialty Phone number    Kelly Lora MD Referring Cardiovascular Disease [phone]

## 2025-07-09 ENCOUNTER — ANTICOAGULATION THERAPY VISIT (OUTPATIENT)
Dept: ANTICOAGULATION | Facility: CLINIC | Age: 65
End: 2025-07-09
Payer: MEDICARE

## 2025-07-09 ENCOUNTER — RESULTS FOLLOW-UP (OUTPATIENT)
Dept: ANTICOAGULATION | Facility: CLINIC | Age: 65
End: 2025-07-09
Payer: MEDICARE

## 2025-07-09 DIAGNOSIS — I50.22 CHRONIC SYSTOLIC CONGESTIVE HEART FAILURE (H): Primary | ICD-10-CM

## 2025-07-09 DIAGNOSIS — Z95.811 LEFT VENTRICULAR ASSIST DEVICE PRESENT (H): ICD-10-CM

## 2025-07-09 DIAGNOSIS — I50.20 HEART FAILURE WITH REDUCED EJECTION FRACTION, NYHA CLASS III (H): ICD-10-CM

## 2025-07-09 DIAGNOSIS — Z95.811 LVAD (LEFT VENTRICULAR ASSIST DEVICE) PRESENT (H): ICD-10-CM

## 2025-07-09 NOTE — PROGRESS NOTES
ANTICOAGULATION MANAGEMENT     Dandy Sands 64 year old male is on warfarin with therapeutic INR result. (Goal INR 2.0-3.0)    Recent labs: (last 7 days)     07/08/25  0904   INR 2.3*       ASSESSMENT     Source(s): Chart Review  Previous INR was Therapeutic last 2(+) visits  Medication, diet, health changes since last INR: chart reviewed; none identified         PLAN     Recommended plan for no diet, medication or health factor changes and previous 2 INR results within goal affecting INR     Dosing Instructions: Continue your current warfarin dose with next INR in 2-3 weeks (earlier if recent changes)       Summary  As of 7/9/2025      Full warfarin instructions:  7.5 mg every Fri; 5 mg all other days   Next INR check:  7/29/2025               Detailed voice message left for Dandy with dosing instructions and follow up date.   Sent [employer] message with dosing and follow up instructions    Patient using outside facility for labs    Education provided: Please call back if any changes to your diet, medications or how you've been taking warfarin  Goal range and lab monitoring: goal range and significance of current result  Symptom monitoring: monitoring for bleeding signs and symptoms  Importance of notifying anticoagulation clinic for: upcoming surgeries and procedures scheduled, if you did not receive dosing instructions on the same day as your labs were checked, and health changes  Written instructions provided  Contact [phone] with any changes, questions or concerns.     Plan made per [facility] anticoagulation protocol and per LVAD protocol    Karen Benjamin RN  7/9/2025  Anticoagulation Clinic  [employer] for routing messages: ollie PECK LVAD  [facility] patient phone line: [phone]        _______________________________________________________________________     Anticoagulation Episode Summary       Current INR goal:  2.0-3.0   TTR:  67.1% (1 y)   Target end date:  Indefinite   Send INR reminders to:  [facility]     Indications    Chronic systolic congestive heart failure (H) [I50.22]  LVAD (left ventricular assist device) present (H) [Z95.811]  Heart failure with reduced ejection fraction  NYHA class III (H) [I50.20]  Left ventricular assist device present (H) [Z95.811]             Comments:  Follow VAD Anticoag protocol:Yes: HeartMate 3   Bridging: No bridging epistaxis.   Date VAD placed: 7/8/22             Anticoagulation Care Providers       Provider Role Specialty Phone number    Kelly Lora MD Referring Cardiovascular Disease [phone]

## 2025-07-22 ENCOUNTER — EXTERNAL ORDER RESULTS (OUTPATIENT)
Dept: LAB | Facility: CLINIC | Age: 65
End: 2025-07-22
Payer: MEDICARE

## 2025-07-24 ENCOUNTER — ANTICOAGULATION THERAPY VISIT (OUTPATIENT)
Dept: ANTICOAGULATION | Facility: CLINIC | Age: 65
End: 2025-07-24
Payer: MEDICARE

## 2025-07-24 DIAGNOSIS — I50.20 HEART FAILURE WITH REDUCED EJECTION FRACTION, NYHA CLASS III (H): ICD-10-CM

## 2025-07-24 DIAGNOSIS — Z95.811 LVAD (LEFT VENTRICULAR ASSIST DEVICE) PRESENT (H): ICD-10-CM

## 2025-07-24 DIAGNOSIS — Z95.811 LEFT VENTRICULAR ASSIST DEVICE PRESENT (H): ICD-10-CM

## 2025-07-24 DIAGNOSIS — I50.22 CHRONIC SYSTOLIC CONGESTIVE HEART FAILURE (H): Primary | ICD-10-CM

## 2025-07-24 LAB
INR (EXTERNAL) - DO NOT USE: 2.3 (ref 0.9–1.1)
PT - PROTHROMBINE TIME (EXTERNAL): 25.3 SECS (ref 12–14.5)

## 2025-07-24 NOTE — PROGRESS NOTES
ANTICOAGULATION MANAGEMENT     Dandy CHAPA Shavon 64 year old male is on warfarin with therapeutic INR result. (Goal INR 2.0-3.0)    Recent labs: (last 7 days)     07/22/25  0924   INR 2.3*       ASSESSMENT     Source(s): Chart Review and Patient/Caregiver Call     Warfarin doses taken: Warfarin taken as instructed  Diet: No new diet changes identified  Medication/supplement changes: None noted  New illness, injury, or hospitalization: No  Signs or symptoms of bleeding or clotting: No  Previous result: Therapeutic last 2(+) visits  Additional findings: None       PLAN     Recommended plan for no diet, medication or health factor changes affecting INR     Dosing Instructions: Continue your current warfarin dose with next INR in 2 weeks       Summary  As of 7/24/2025      Full warfarin instructions:  7.5 mg every Fri; 5 mg all other days   Next INR check:  8/7/2025               Telephone call with Dandy who verbalizes understanding and agrees to plan and who agrees to plan and repeated back plan correctly    Patient using outside facility for labs    Education provided: Please call back if any changes to your diet, medications or how you've been taking warfarin    Plan made per [facility] anticoagulation protocol and per LVAD protocol    Parul Nunez RN  7/24/2025  Anticoagulation Clinic  [employer] for routing messages: [facility]ie ANTICOAG LVAD  [facility] patient phone line: [phone]        _______________________________________________________________________     Anticoagulation Episode Summary       Current INR goal:  2.0-3.0   TTR:  70.1% (1 y)   Target end date:  Indefinite   Send INR reminders to:  ANTICOAG LVAD    Indications    Chronic systolic congestive heart failure (H) [I50.22]  LVAD (left ventricular assist device) present (H) [Z95.811]  Heart failure with reduced ejection fraction  NYHA class III (H) [I50.20]  Left ventricular assist device present (H) [Z95.811]             Comments:  Follow VAD Anticoag protocol:Yes:  HeartMate 3   Bridging: No bridging epistaxis.   Date VAD placed: 7/8/22             Anticoagulation Care Providers       Provider Role Specialty Phone number    Kelly Lora MD Referring Cardiovascular Disease [phone]

## 2025-08-05 ENCOUNTER — TELEPHONE (OUTPATIENT)
Dept: ANTICOAGULATION | Facility: CLINIC | Age: 65
End: 2025-08-05
Payer: MEDICARE

## 2025-08-05 DIAGNOSIS — I50.22 CHRONIC SYSTOLIC CONGESTIVE HEART FAILURE (H): Primary | ICD-10-CM

## 2025-08-05 DIAGNOSIS — Z95.811 LVAD (LEFT VENTRICULAR ASSIST DEVICE) PRESENT (H): ICD-10-CM

## 2025-08-05 DIAGNOSIS — Z95.811 LEFT VENTRICULAR ASSIST DEVICE PRESENT (H): ICD-10-CM

## 2025-08-05 DIAGNOSIS — I50.20 HEART FAILURE WITH REDUCED EJECTION FRACTION, NYHA CLASS III (H): ICD-10-CM

## 2025-08-19 ENCOUNTER — ANTICOAGULATION THERAPY VISIT (OUTPATIENT)
Dept: ANTICOAGULATION | Facility: CLINIC | Age: 65
End: 2025-08-19
Payer: MEDICARE

## 2025-08-19 DIAGNOSIS — Z95.811 LVAD (LEFT VENTRICULAR ASSIST DEVICE) PRESENT (H): ICD-10-CM

## 2025-08-19 DIAGNOSIS — I50.22 CHRONIC SYSTOLIC CONGESTIVE HEART FAILURE (H): Primary | ICD-10-CM

## 2025-08-19 DIAGNOSIS — I50.20 HEART FAILURE WITH REDUCED EJECTION FRACTION, NYHA CLASS III (H): ICD-10-CM

## 2025-08-19 DIAGNOSIS — Z95.811 LEFT VENTRICULAR ASSIST DEVICE PRESENT (H): ICD-10-CM

## 2025-08-19 LAB — INR (EXTERNAL): 2.2 (ref 0.9–1.1)

## 2025-08-31 ENCOUNTER — HEALTH MAINTENANCE LETTER (OUTPATIENT)
Age: 65
End: 2025-08-31

## (undated) DEVICE — INTRO SHEATH MICRO PLATINUM TIP 4FRX40CM 7274

## (undated) DEVICE — Device

## (undated) DEVICE — INTRO SHEATH 6FRX25CM PINNACLE RSS606

## (undated) DEVICE — PREP CHLORAPREP 26ML TINTED HI-LITE ORANGE 930815

## (undated) DEVICE — SU ETHIBOND 2-0 MHDA 36" X843H

## (undated) DEVICE — TIES BANDING T50R

## (undated) DEVICE — LINEN TOWEL PACK X30 5481

## (undated) DEVICE — PACK ADULT HEART UMMC PV15CG92D

## (undated) DEVICE — OPTIFOAM BASIC

## (undated) DEVICE — CATH GUIDING BLUE YELLOW PTFE JR4 6FRX100CM 67008200

## (undated) DEVICE — CLIP HORIZON SM RED WIDE SLOT 001201

## (undated) DEVICE — DRAPE SHEET REV FOLD 3/4 9349

## (undated) DEVICE — SYR 30ML LL W/O NDL 302832

## (undated) DEVICE — INTRODUCER SHEATH FAST-CATH CATH-LOCK 7FRX12CM 406702

## (undated) DEVICE — INTRO SHEATH 9FRX10CM PINNACLE RSS902

## (undated) DEVICE — ESU PENCIL SMOKE EVAC W/ROCKER SWITCH 0703-047-000

## (undated) DEVICE — KIT ENDO FIRST STEP DISINFECTANT 200ML W/POUCH EP-4

## (undated) DEVICE — CONNECTOR ONE-LINK INJECTION SITE LF 7N8399

## (undated) DEVICE — CATH BALLOON EMERGE 3.0X20MM H7493918920300

## (undated) DEVICE — DRSG DRAIN 4X4" 7086

## (undated) DEVICE — SPONGE RAY-TEC 4X8" 7318

## (undated) DEVICE — NDL COUNTER 20CT 31142493

## (undated) DEVICE — KIT RIGHT HEART CATH 60130719

## (undated) DEVICE — DRSG BIOPATCH GERMICIDAL SPLIT SPONGE 4MM MED 4150

## (undated) DEVICE — 30IN (76CM) EXCITE FLEXIBLE, CLEAR NYLON/POLYURETHANE CO-EXTRUDED CONTRAST INJECTION TUBING, FIXED MALE CONNECTOR

## (undated) DEVICE — DRAIN CHEST TUBE RIGHT ANGLED 28FR 8128

## (undated) DEVICE — PROTECTOR ARM ONE-STEP TRENDELENBURG 40418

## (undated) DEVICE — KIT DVC ANGIO IBASIXCOMPAK 13INX20ML 3WAY IN4430

## (undated) DEVICE — SOL NACL 0.9% IRRIG 1000ML BOTTLE 2F7124

## (undated) DEVICE — SU ETHIBOND 0 CT-1 CR 8X18" CX21D

## (undated) DEVICE — CATH BALLOON NC EMERGE 4.00X15MM H7493926715400

## (undated) DEVICE — PACK HEART LEFT CUSTOM

## (undated) DEVICE — LINEN GOWN XLG 5407

## (undated) DEVICE — SU VICRYL 3-0 FS-1 27" J442H

## (undated) DEVICE — WIPES FOLEY CARE SURESTEP PROVON DFC100

## (undated) DEVICE — ESU HOLSTER PLASTIC DISP E2400

## (undated) DEVICE — SU PROLENE 4-0 RB-1DA 36" 8557H

## (undated) DEVICE — VAD MODULAR CABLE

## (undated) DEVICE — VESSEL LOOPS YELLOW MAXI 31145694

## (undated) DEVICE — INTRO SHEATH 7FRX10CM PINNACLE RSS702

## (undated) DEVICE — SOL NACL 0.9% 10ML VIAL 0409-4888-02

## (undated) DEVICE — DRAPE IOBAN INCISE 23X17" 6650EZ

## (undated) DEVICE — ESU ELEC BLADE 2.75" COATED/INSULATED E1455

## (undated) DEVICE — CLIP HORIZON MED BLUE 002200

## (undated) DEVICE — SU SILK 0 TIE 6X30" A306H

## (undated) DEVICE — DECANTER BAG 2002S

## (undated) DEVICE — INTRO SHEATH 7FRX25CM PINNACLE RSS706

## (undated) DEVICE — TAPE MEDIPORE 4"X2YD 2864

## (undated) DEVICE — TUBING PRESSURE TRANSDUCER MALE TO FEMALE LL 72" 50P172

## (undated) DEVICE — PACK HEART RIGHT CUSTOM SAN32RHF18

## (undated) DEVICE — SOL WATER IRRIG 1000ML BOTTLE 2F7114

## (undated) DEVICE — PREP POVIDONE-IODINE 7.5% SCRUB 4OZ BOTTLE MDS093945

## (undated) DEVICE — CONNECTOR BLAKE DRAIN SGL BCC1

## (undated) DEVICE — ENDO CAP AND TUBING STERILE FOR ENDOGATOR  100130

## (undated) DEVICE — GLOVE BIOGEL PI MICRO SZ 7.5 48575

## (undated) DEVICE — BLADE CLIPPER SGL USE 9680

## (undated) DEVICE — SU ETHIBOND 2-0 SHDA 30" X563H

## (undated) DEVICE — WIRE GUIDE 0.018"X180CM PLATINUM PLUS H74917531

## (undated) DEVICE — SLEEVE REPOSITIONING W/CATH LOCK 60CM 406503

## (undated) DEVICE — CATH ANGIO SUPERTORQUE PLUS JR4 6FRX100CM 533621

## (undated) DEVICE — KIT HAND CONTROL ACIST 014644 AR-P54

## (undated) DEVICE — DRSG ABDOMINAL 07 1/2X8" 7197D

## (undated) DEVICE — GUIDEWIRE VASC 0.025X260X15CM J-TIP G02384

## (undated) DEVICE — SU PROLENE 5-0 RB-2DA 30" 8710H

## (undated) DEVICE — DRSG WOUND VAC SPONGE MED BLACK M8275052/5

## (undated) DEVICE — DRSG GAUZE 4X4" TRAY 6939

## (undated) DEVICE — DRAIN MEDIASTINAL 9MM 14609

## (undated) DEVICE — CATH BALLOON ANGIOSCULPT RX 3.0X10MM 6FRX137CM 2200-3010

## (undated) DEVICE — DRAPE TIBURON CARDIOVASCULAR PERI-GROIN LF 9154

## (undated) DEVICE — GUIDEWIRE ROSEN CVD .035X260CM G01253 THSCF-35-260-1.5-ROSEN

## (undated) DEVICE — PAD DEFIBRILLATOR ELECTRODE 4.25INX6IN ADULT 2001M-C

## (undated) DEVICE — GLOVE PROTEXIS W/NEU-THERA 7.5  2D73TE75

## (undated) DEVICE — SU STEEL MYO/WIRE II STERNOTOMY 8 BE-1 3X14" 048-217

## (undated) DEVICE — MANIFOLD KIT ANGIO AUTOMATED 014613

## (undated) DEVICE — CATH BALLOON NC EMERGE 3.50X15MM H7493926715350

## (undated) DEVICE — STPL SKIN 35W ROTATING HEAD PRW35

## (undated) DEVICE — DRAIN CHEST TUBE RIGHT ANGLED 32FR 8132

## (undated) DEVICE — CABLE PACING ALLIGATOR CLIP 12FT 5833SL

## (undated) DEVICE — DRAPE C-ARM W/STRAPS 42X72" 07-CA104

## (undated) DEVICE — INTRODUCER SAFESHEATH II LONG 7FR 23CM SSL7

## (undated) DEVICE — GUIDEWIRE ASAHI SION BLUE 190CM J TIP AHW14R104J

## (undated) DEVICE — DRAPE SLEEVE MEDT STERILE 10FT 6177

## (undated) DEVICE — TUBING SMOKE EVAC PNEUMOCLEAR HIGH FLOW 0620050250

## (undated) DEVICE — SHTH INTRO 0.021IN ID 6FR DIA

## (undated) DEVICE — SUCTION MANIFOLD NEPTUNE 2 SYS 4 PORT 0702-020-000

## (undated) DEVICE — BNDG ESMARK 6" STERILE LF 820-3612

## (undated) DEVICE — SUTURE BOOTS 051003PBX

## (undated) DEVICE — SU VICRYL 0 CTX 36" J370H

## (undated) DEVICE — KIT LEAD END CAP 5867-3M

## (undated) DEVICE — DRSG TEGADERM 4X4 3/4" 1626W

## (undated) DEVICE — PREP SKIN SCRUB TRAY 4461A

## (undated) DEVICE — KIT INTRODUCER DL 9FR 11.5CM J-TIP 0.35"X45CM CDC-21242-1A

## (undated) DEVICE — SOL NACL 0.9% IRRIG 3000ML BAG 2B7477

## (undated) DEVICE — SPONGE KITTNER 30-101

## (undated) DEVICE — DRSG TELFA 3X8" 1238

## (undated) DEVICE — KIT CONNECTOR FOR OLYMPUS ENDOSCOPES DEFENDO 100310

## (undated) DEVICE — TUBING IV 69" STERILE 1C8160S

## (undated) DEVICE — FASTENER CATH BALLOON CLAMPX2 STATLOCK 0684-00-493

## (undated) DEVICE — 8FR GUARDIAN II HEMOSTASIS VALVE WITH GUIDEWIRE INSERTION TOOL (FORMERLY VASCULAR SOLUTIONS)

## (undated) DEVICE — GLIDEWIRE TERUMO .035X180CM 1.5,, J-TIP GR3525

## (undated) DEVICE — SURGICEL HEMOSTAT 4X8" 1952

## (undated) DEVICE — WIRE GUIDE LUNDERQUIST .035X260CM G31453

## (undated) DEVICE — CANISTER WOUND VAC W/GEL 1000ML M8275093/5

## (undated) DEVICE — SYR 10ML SLIP TIP W/O NDL 303134

## (undated) DEVICE — GLOVE BIOGEL PI SZ 8.0 40880

## (undated) DEVICE — DRAIN CHEST TUBE 28FR STR 8028

## (undated) DEVICE — KIT WRENCH 5873W

## (undated) DEVICE — BASIN SET SINGLE STERILE 13752-624

## (undated) DEVICE — DEFIB PRO-PADZ LVP LQD GEL ADULT 8900-2105-01

## (undated) DEVICE — BONE WAX 2.5GM W31G

## (undated) DEVICE — SU VICRYL 2-0 CT-1 27" UND J259H

## (undated) DEVICE — SYSTEM CONTROLLER

## (undated) DEVICE — LINEN TOWEL PACK X6 WHITE 5487

## (undated) DEVICE — SU PLEDGET SOFT TFE 3/8"X3/26"X1/16" PCP40

## (undated) DEVICE — PREP POVIDONE IODINE SOLUTION 10% 4OZ

## (undated) DEVICE — ESU ELEC BLADE E-SEP INSULATED NEPTUNE 70MM 0703-070-002

## (undated) DEVICE — DRSG ACTICOAT 7 4X5" 20141

## (undated) DEVICE — SU MONOCRYL 4-0 PS-2 27" UND Y426H

## (undated) DEVICE — CATH BALLOON EMERGE 3.0X15MM H7493918915300

## (undated) DEVICE — ENDO TUBING CO2 SMARTCAP STERILE DISP 100145CO2EXT

## (undated) DEVICE — CATH US OD 5FR OD SEC 2.9FR EAGLE EYE PLATINUM 0.014 85900P

## (undated) DEVICE — GLOVE BIOGEL PI MICRO INDICATOR UNDERGLOVE SZ 8.0 48980

## (undated) DEVICE — ESU ELEC BLADE 6" COATED/INSULATED E1455-6

## (undated) DEVICE — GW VASC .035IN DIA 260CML 7CML 3 MM RADIUS J CURVE 502455

## (undated) DEVICE — SU VICRYL 0 CT-1 27" UND J260H

## (undated) DEVICE — SOL NACL 0.9% INJ 1000ML BAG 07983-09

## (undated) DEVICE — DRSG PRIMAPORE 03 1/8X6" 66000318

## (undated) DEVICE — GOWN LG DISP 9515

## (undated) DEVICE — PACK ENDOSCOPY GI CUSTOM UMMC

## (undated) DEVICE — DRSG TEGADERM 8X12" 1629

## (undated) DEVICE — DRAPE MAYO STAND 23X54 8337

## (undated) DEVICE — TUBING INSUFFLATION PNEUMOCLEAR 0620050100

## (undated) DEVICE — SUCTION DRY CHEST DRAIN OASIS 3600-100

## (undated) DEVICE — RX SURGIFLO HEMOSTATIC MATRIX W/THROMBIN 8ML 2994

## (undated) DEVICE — ADH SKIN CLOSURE PREMIERPRO EXOFIN 1.0ML 3470

## (undated) DEVICE — SU PLEDGET SOFT TFE 13MMX7MMX1.5MM D7044

## (undated) DEVICE — PAD CHUX UNDERPAD 23X24" 7136

## (undated) DEVICE — SU PROLENE 4-0 SHDA 36" 8521H

## (undated) DEVICE — TUBING SUCTION DRAINAGE PLEURAL DUAL 8884714200

## (undated) DEVICE — SU ETHIBOND 3-0 BBDA 36" X588H

## (undated) DEVICE — PREP POVIDONE IODINE SOLUTION 10% 4OZ BOTTLE 29906-004

## (undated) DEVICE — CATH GUIDING BLUE YELLOW PTFE XB3.5 6FRX100CM 67005400

## (undated) DEVICE — 7FR X 13CM SAFESHEATH II TEAR-AWAY VALVED SHEATH SYSTEM, WITH SIDE PORT, 0.038IN GUIDEWIRE, 19.5CM DILATOR

## (undated) DEVICE — PREP POVIDONE-IODINE 10% SOLUTION 4OZ BOTTLE MDS093944

## (undated) DEVICE — CATH ANGIO INFINITI PIGTAIL 145 6 SH 6FRX110CM  534-652S

## (undated) DEVICE — DRSG PRIMAPORE 02X3" 7133

## (undated) DEVICE — CATH GUIDELINER 6FR 5571

## (undated) DEVICE — INTRO GLIDESHEATH SLENDER 6FR 10X45CM 60-1060

## (undated) DEVICE — SU STEEL 6 CCS 4X18" M654G

## (undated) DEVICE — COVER ULTRASOUND PROBE W/GEL FLEXI-FEEL 6"X58" LF  25-FF658

## (undated) DEVICE — PUNCH AORTIC 5.0MM LONG AP-550

## (undated) DEVICE — BLADE SAW STERNAL 20X30MM KM-32

## (undated) DEVICE — WIRE GUIDE 0.025"X150CM EMERALD J TIP 502524

## (undated) DEVICE — LEAD PACER MYOCARDIAL BIPOLAR TEMPORARY 53CM 6495F

## (undated) DEVICE — DECANTER VIAL 2006S

## (undated) DEVICE — DRAPE BACK TABLE  44X90" 8377

## (undated) DEVICE — PACK SET-UP STD 9102

## (undated) DEVICE — PREP DURAPREP 26ML APL 8630

## (undated) DEVICE — SLEEVE TR BAND RADIAL COMPRESSION DEVICE 24CM TRB24-REG

## (undated) DEVICE — GLOVE PROTEXIS W/NEU-THERA 6.0  2D73TE60

## (undated) DEVICE — DRAIN CHEST TUBE 32FR STR 8032

## (undated) DEVICE — SPONGE LAP 18X18" X8435

## (undated) DEVICE — CATH BALLOON NC EMERGE 3.00X12MM H7493926712300

## (undated) DEVICE — ESU GROUND PAD ADULT W/CORD E7507

## (undated) DEVICE — WIRE GUIDE HYDROPHYLIC .035"X180CM ANG LWSTDA35180

## (undated) DEVICE — DEVICE CATH STATLOCK INTRA-AORTIC BALL PUMP 0684-00-0472

## (undated) DEVICE — CATH ANGIO SUPERTORQUE PLUS JL4 6FRX100CM 533620

## (undated) DEVICE — CATH CORONARY LITHOTRIPSY SHOCKWAVE 3.5X12MM C2IVL3512

## (undated) RX ORDER — CEFAZOLIN SODIUM 1 G/3ML
INJECTION, POWDER, FOR SOLUTION INTRAMUSCULAR; INTRAVENOUS
Status: DISPENSED
Start: 2022-06-23

## (undated) RX ORDER — FENTANYL CITRATE 50 UG/ML
INJECTION, SOLUTION INTRAMUSCULAR; INTRAVENOUS
Status: DISPENSED
Start: 2022-07-11

## (undated) RX ORDER — BUPIVACAINE HYDROCHLORIDE 2.5 MG/ML
INJECTION, SOLUTION EPIDURAL; INFILTRATION; INTRACAUDAL
Status: DISPENSED
Start: 2023-03-18

## (undated) RX ORDER — BUPIVACAINE HYDROCHLORIDE 5 MG/ML
INJECTION, SOLUTION EPIDURAL; INTRACAUDAL
Status: DISPENSED
Start: 2022-06-23

## (undated) RX ORDER — FENTANYL CITRATE 50 UG/ML
INJECTION, SOLUTION INTRAMUSCULAR; INTRAVENOUS
Status: DISPENSED
Start: 2022-07-03

## (undated) RX ORDER — NITROGLYCERIN 5 MG/ML
VIAL (ML) INTRAVENOUS
Status: DISPENSED
Start: 2022-05-24

## (undated) RX ORDER — FENTANYL CITRATE 50 UG/ML
INJECTION, SOLUTION INTRAMUSCULAR; INTRAVENOUS
Status: DISPENSED
Start: 2022-05-23

## (undated) RX ORDER — HEPARIN SODIUM 1000 [USP'U]/ML
INJECTION, SOLUTION INTRAVENOUS; SUBCUTANEOUS
Status: DISPENSED
Start: 2022-07-12

## (undated) RX ORDER — DEXAMETHASONE SODIUM PHOSPHATE 4 MG/ML
INJECTION, SOLUTION INTRA-ARTICULAR; INTRALESIONAL; INTRAMUSCULAR; INTRAVENOUS; SOFT TISSUE
Status: DISPENSED
Start: 2022-06-23

## (undated) RX ORDER — SIMETHICONE 40MG/0.6ML
SUSPENSION, DROPS(FINAL DOSAGE FORM)(ML) ORAL
Status: DISPENSED
Start: 2022-05-23

## (undated) RX ORDER — HEPARIN SODIUM 1000 [USP'U]/ML
INJECTION, SOLUTION INTRAVENOUS; SUBCUTANEOUS
Status: DISPENSED
Start: 2022-05-24

## (undated) RX ORDER — VANCOMYCIN HYDROCHLORIDE 1 G/20ML
INJECTION, POWDER, LYOPHILIZED, FOR SOLUTION INTRAVENOUS
Status: DISPENSED
Start: 2022-06-23

## (undated) RX ORDER — CEFAZOLIN SODIUM 1 G/3ML
INJECTION, POWDER, FOR SOLUTION INTRAMUSCULAR; INTRAVENOUS
Status: DISPENSED
Start: 2023-01-13

## (undated) RX ORDER — HEPARIN SODIUM 1000 [USP'U]/ML
INJECTION, SOLUTION INTRAVENOUS; SUBCUTANEOUS
Status: DISPENSED
Start: 2022-06-23

## (undated) RX ORDER — FUROSEMIDE 10 MG/ML
INJECTION INTRAMUSCULAR; INTRAVENOUS
Status: DISPENSED
Start: 2022-05-24

## (undated) RX ORDER — ACETAMINOPHEN 325 MG/1
TABLET ORAL
Status: DISPENSED
Start: 2023-03-17

## (undated) RX ORDER — FENTANYL CITRATE 50 UG/ML
INJECTION, SOLUTION INTRAMUSCULAR; INTRAVENOUS
Status: DISPENSED
Start: 2023-01-13

## (undated) RX ORDER — LEVOFLOXACIN 5 MG/ML
INJECTION, SOLUTION INTRAVENOUS
Status: DISPENSED
Start: 2023-03-17

## (undated) RX ORDER — KETOROLAC TROMETHAMINE 30 MG/ML
INJECTION, SOLUTION INTRAMUSCULAR; INTRAVENOUS
Status: DISPENSED
Start: 2022-06-23

## (undated) RX ORDER — PROPOFOL 10 MG/ML
INJECTION, EMULSION INTRAVENOUS
Status: DISPENSED
Start: 2022-07-12

## (undated) RX ORDER — NOREPINEPHRINE BITARTRATE 0.06 MG/ML
INJECTION, SOLUTION INTRAVENOUS
Status: DISPENSED
Start: 2022-05-24

## (undated) RX ORDER — FENTANYL CITRATE 50 UG/ML
INJECTION, SOLUTION INTRAMUSCULAR; INTRAVENOUS
Status: DISPENSED
Start: 2022-07-12

## (undated) RX ORDER — FENTANYL CITRATE 50 UG/ML
INJECTION, SOLUTION INTRAMUSCULAR; INTRAVENOUS
Status: DISPENSED
Start: 2022-07-08

## (undated) RX ORDER — FENTANYL CITRATE 50 UG/ML
INJECTION, SOLUTION INTRAMUSCULAR; INTRAVENOUS
Status: DISPENSED
Start: 2022-07-13

## (undated) RX ORDER — FENTANYL CITRATE 50 UG/ML
INJECTION, SOLUTION INTRAMUSCULAR; INTRAVENOUS
Status: DISPENSED
Start: 2023-03-18

## (undated) RX ORDER — FENTANYL CITRATE 50 UG/ML
INJECTION, SOLUTION INTRAMUSCULAR; INTRAVENOUS
Status: DISPENSED
Start: 2022-05-24

## (undated) RX ORDER — OXYCODONE HYDROCHLORIDE 5 MG/1
TABLET ORAL
Status: DISPENSED
Start: 2023-03-18

## (undated) RX ORDER — ONDANSETRON 2 MG/ML
INJECTION INTRAMUSCULAR; INTRAVENOUS
Status: DISPENSED
Start: 2022-06-23

## (undated) RX ORDER — CEFAZOLIN SODIUM 2 G/100ML
INJECTION, SOLUTION INTRAVENOUS
Status: DISPENSED
Start: 2023-01-13

## (undated) RX ORDER — LIDOCAINE HYDROCHLORIDE 10 MG/ML
INJECTION, SOLUTION EPIDURAL; INFILTRATION; INTRACAUDAL; PERINEURAL
Status: DISPENSED
Start: 2022-08-01

## (undated) RX ORDER — ONDANSETRON 2 MG/ML
INJECTION INTRAMUSCULAR; INTRAVENOUS
Status: DISPENSED
Start: 2022-06-17

## (undated) RX ORDER — FENTANYL CITRATE 50 UG/ML
INJECTION, SOLUTION INTRAMUSCULAR; INTRAVENOUS
Status: DISPENSED
Start: 2022-06-23

## (undated) RX ORDER — IOPAMIDOL 510 MG/ML
INJECTION, SOLUTION INTRAVASCULAR
Status: DISPENSED
Start: 2022-05-23

## (undated) RX ORDER — FENTANYL CITRATE-0.9 % NACL/PF 10 MCG/ML
PLASTIC BAG, INJECTION (ML) INTRAVENOUS
Status: DISPENSED
Start: 2022-06-23

## (undated) RX ORDER — PROPOFOL 10 MG/ML
INJECTION, EMULSION INTRAVENOUS
Status: DISPENSED
Start: 2023-03-18

## (undated) RX ORDER — ETOMIDATE 2 MG/ML
INJECTION INTRAVENOUS
Status: DISPENSED
Start: 2022-06-23

## (undated) RX ORDER — HEPARIN SODIUM 1000 [USP'U]/ML
INJECTION, SOLUTION INTRAVENOUS; SUBCUTANEOUS
Status: DISPENSED
Start: 2022-07-08

## (undated) RX ORDER — SODIUM CHLORIDE 9 MG/ML
INJECTION, SOLUTION INTRAVENOUS
Status: DISPENSED
Start: 2023-01-13

## (undated) RX ORDER — SODIUM NITROPRUSSIDE 25 MG/ML
INJECTION INTRAVENOUS
Status: DISPENSED
Start: 2022-05-18

## (undated) RX ORDER — INDOMETHACIN 50 MG/1
SUPPOSITORY RECTAL
Status: DISPENSED
Start: 2022-05-23

## (undated) RX ORDER — LIDOCAINE HYDROCHLORIDE AND EPINEPHRINE 10; 10 MG/ML; UG/ML
INJECTION, SOLUTION INFILTRATION; PERINEURAL
Status: DISPENSED
Start: 2022-06-23

## (undated) RX ORDER — HYDROMORPHONE HYDROCHLORIDE 1 MG/ML
INJECTION, SOLUTION INTRAMUSCULAR; INTRAVENOUS; SUBCUTANEOUS
Status: DISPENSED
Start: 2023-03-18

## (undated) RX ORDER — CEFAZOLIN SODIUM 1 G/3ML
INJECTION, POWDER, FOR SOLUTION INTRAMUSCULAR; INTRAVENOUS
Status: DISPENSED
Start: 2022-07-12

## (undated) RX ORDER — LIDOCAINE HYDROCHLORIDE 20 MG/ML
INJECTION, SOLUTION EPIDURAL; INFILTRATION; INTRACAUDAL; PERINEURAL
Status: DISPENSED
Start: 2022-06-23

## (undated) RX ORDER — LIDOCAINE HYDROCHLORIDE 20 MG/ML
INJECTION, SOLUTION EPIDURAL; INFILTRATION; INTRACAUDAL; PERINEURAL
Status: DISPENSED
Start: 2022-07-12

## (undated) RX ORDER — LIDOCAINE HYDROCHLORIDE 10 MG/ML
INJECTION, SOLUTION INFILTRATION; PERINEURAL
Status: DISPENSED
Start: 2022-05-29

## (undated) RX ORDER — FENTANYL CITRATE 50 UG/ML
INJECTION, SOLUTION INTRAMUSCULAR; INTRAVENOUS
Status: DISPENSED
Start: 2022-06-17

## (undated) RX ORDER — ASPIRIN 325 MG
TABLET ORAL
Status: DISPENSED
Start: 2022-05-24

## (undated) RX ORDER — HEPARIN SODIUM,PORCINE 10 UNIT/ML
VIAL (ML) INTRAVENOUS
Status: DISPENSED
Start: 2022-11-01

## (undated) RX ORDER — NICARDIPINE HCL-0.9% SOD CHLOR 1 MG/10 ML
SYRINGE (ML) INTRAVENOUS
Status: DISPENSED
Start: 2022-05-24

## (undated) RX ORDER — HYDROMORPHONE HCL IN WATER/PF 6 MG/30 ML
PATIENT CONTROLLED ANALGESIA SYRINGE INTRAVENOUS
Status: DISPENSED
Start: 2023-03-18

## (undated) RX ORDER — FENTANYL CITRATE 50 UG/ML
INJECTION, SOLUTION INTRAMUSCULAR; INTRAVENOUS
Status: DISPENSED
Start: 2022-05-29

## (undated) RX ORDER — CEFAZOLIN SODIUM 1 G/3ML
INJECTION, POWDER, FOR SOLUTION INTRAMUSCULAR; INTRAVENOUS
Status: DISPENSED
Start: 2022-07-08

## (undated) RX ORDER — PROPOFOL 10 MG/ML
INJECTION, EMULSION INTRAVENOUS
Status: DISPENSED
Start: 2022-05-23